# Patient Record
Sex: FEMALE | Race: WHITE | NOT HISPANIC OR LATINO | Employment: OTHER | URBAN - METROPOLITAN AREA
[De-identification: names, ages, dates, MRNs, and addresses within clinical notes are randomized per-mention and may not be internally consistent; named-entity substitution may affect disease eponyms.]

---

## 2017-03-28 ENCOUNTER — TELEPHONE (OUTPATIENT)
Dept: FAMILY MEDICINE | Facility: CLINIC | Age: 57
End: 2017-03-28

## 2017-03-28 NOTE — TELEPHONE ENCOUNTER
Panel Management Review      Patient has the following on her problem list:     Hypertension   Last three blood pressure readings:  BP Readings from Last 3 Encounters:   07/13/16 112/72   04/27/16 102/56   03/20/16 114/61     Blood pressure: Passed    HTN Guidelines:  Age 18-59 BP range:  Less than 140/90  Age 60-85 with Diabetes:  Less than 140/90  Age 60-85 without Diabetes:  less than 150/90      Composite cancer screening  Chart review shows that this patient is due/due soon for the following Colonoscopy  Summary:    Patient is due/failing the following:   COLONOSCOPY    Action needed:   Patient needs referral/order: Colonoscopy     Type of outreach:    Sent letter.  Patient did not read last my chart message, will send letter.    Questions for provider review:    None                                                                                                                                    Jennifer Olea CMA

## 2017-03-28 NOTE — LETTER
[facility]  [address]  [address]  Phone: [phone]      March 28, 2017      Amelia Michel  [address] [place] [address]          Dear Amelia,    In order to ensure we are providing the best quality care, Dr. Sabillon and I have reviewed your chart and see that you are due for a Colonoscopy.     Please call one of the following numbers to schedule a colonoscopy:  [facility] [phone]  [facility] [phone]  [facility] [phone]  [facility] [phone] (multiple sites, call for locations)    Colon cancer screening is recommended starting at the age of 50 for the general population. We have noticed that you have not yet had your colonoscopy. We recognize that this procedure can be time consuming and sometimes uncomfortable. However, colonoscopy is the gold standard for colon cancer screening and may be performed as infrequently as every 10 years as long as the colon appears healthy.  Colonoscopy enables the physician to detect and remove precancerous polyps and identify early cancers during a single examination. Since colorectal cancer is the second leading cause of cancer related deaths in the [place] we strongly recommend screening for this disease.    If you are reluctant to undergo a colonoscopy, there is a less invasive and less expensive alternative. FIT (fecal immunochemical testing(take home stool sample kit)) is a colon screening option.  This is a test to check for blood in the stool, which can be performed in the comfort of your own home. When you have completed the test, you simply mail it in the pre-addressed envelope.  It does not replace the colonoscopy for colorectal cancer screening, but it can detect hidden bleeding in the lower colon.  It does need to be repeated every year and if a positive result is obtained, you would be referred for a colonoscopy. I have included the test.     We greatly appreciate the opportunity  to serve you. Thank you for trusting us with your health care.    Sincerely,    SYED Fritz M.D.

## 2017-04-04 ENCOUNTER — RADIANT APPOINTMENT (OUTPATIENT)
Dept: GENERAL RADIOLOGY | Facility: CLINIC | Age: 57
End: 2017-04-04
Attending: FAMILY MEDICINE
Payer: COMMERCIAL

## 2017-04-04 ENCOUNTER — OFFICE VISIT (OUTPATIENT)
Dept: FAMILY MEDICINE | Facility: CLINIC | Age: 57
End: 2017-04-04
Payer: COMMERCIAL

## 2017-04-04 VITALS
SYSTOLIC BLOOD PRESSURE: 104 MMHG | DIASTOLIC BLOOD PRESSURE: 78 MMHG | TEMPERATURE: 98.9 F | HEIGHT: 58 IN | WEIGHT: 135.8 LBS | BODY MASS INDEX: 28.51 KG/M2 | HEART RATE: 141 BPM

## 2017-04-04 DIAGNOSIS — M05.89 OTHER RHEUMATOID ARTHRITIS WITH RHEUMATOID FACTOR OF MULTIPLE SITES (H): ICD-10-CM

## 2017-04-04 DIAGNOSIS — M25.571 ACUTE RIGHT ANKLE PAIN: Primary | ICD-10-CM

## 2017-04-04 DIAGNOSIS — M25.571 ACUTE RIGHT ANKLE PAIN: ICD-10-CM

## 2017-04-04 DIAGNOSIS — L03.115 CELLULITIS OF RIGHT LOWER EXTREMITY: ICD-10-CM

## 2017-04-04 DIAGNOSIS — I89.0 LYMPHEDEMA OF BOTH LOWER EXTREMITIES: ICD-10-CM

## 2017-04-04 PROCEDURE — 73610 X-RAY EXAM OF ANKLE: CPT | Mod: RT

## 2017-04-04 PROCEDURE — 73630 X-RAY EXAM OF FOOT: CPT | Mod: RT

## 2017-04-04 PROCEDURE — 99214 OFFICE O/P EST MOD 30 MIN: CPT | Performed by: FAMILY MEDICINE

## 2017-04-04 RX ORDER — GABAPENTIN 100 MG/1
200 CAPSULE ORAL 3 TIMES DAILY
Qty: 180 CAPSULE | Refills: 3 | Status: ON HOLD | OUTPATIENT
Start: 2017-04-04 | End: 2017-08-04

## 2017-04-04 RX ORDER — CLINDAMYCIN HCL 300 MG
300 CAPSULE ORAL 3 TIMES DAILY
Qty: 21 CAPSULE | Refills: 0 | Status: SHIPPED | OUTPATIENT
Start: 2017-04-04 | End: 2017-04-11

## 2017-04-04 NOTE — PROGRESS NOTES
SUBJECTIVE:                                                    Amelia Michel is a 56 year old female who presents to clinic today for the following health issues:  Chief Complaint   Patient presents with     Foot Pain     Sunday she noticed that her right foot is swollen. Painful to walk and put pressure on it.        Right foot started to hurt 3 days ago   Was outside barefoot but doesn't remember injury to the foot     It hurts to bear weight   Swelling in ankle     Has RA   Has lymphedema in both feet - multiple surgeries on both feet - new diagnosis of one year   Has neuropathy in both feet   Has had negative doppler of the right leg last year     No change in meds recently     On methotrexate, ariva, and prednisone     Review of systems:  No f/c   No malaise or fatigue  Normal appetite and energy level  No n/v   No d/c       Problem list and histories reviewed & adjusted, as indicated.  Additional history: as documented    Patient Active Problem List   Diagnosis     HTN (hypertension)     Primary pulmonary hypertension (H)     Rheumatoid arthritis (H)     Hyperlipidemia LDL goal <130     Other rheumatoid arthritis with rheumatoid factor of multiple sites (H)     Current Outpatient Prescriptions   Medication     gabapentin (NEURONTIN) 100 MG capsule     diphenoxylate-atropine (LOMOTIL) 2.5-0.025 MG per tablet     ASPIRIN PO     multivitamin, therapeutic with minerals (THERA-VIT-M) TABS     lisinopril (PRINIVIL,ZESTRIL) 30 MG tablet     hydroxychloroquine (PLAQUENIL) 200 MG tablet     leflunomide (ARAVA) 20 MG tablet     folic acid (FOLVITE) 1 MG tablet     predniSONE (DELTASONE) 1 MG tablet     METHOTREXate 2.5 MG tablet     Calcium Carbonate (CALCIUM 500 PO)     [DISCONTINUED] gabapentin (NEURONTIN) 100 MG capsule     pramipexole (MIRAPEX) 0.25 MG tablet     order for DME     traMADol (ULTRAM) 50 MG tablet     No current facility-administered medications for this visit.         Allergies   Allergen  "Reactions     Penicillins      Tape [Adhesive Tape] Rash     /78  Pulse 141  Temp 98.9  F (37.2  C) (Tympanic)  Ht 4' 10.25\" (1.48 m)  Wt 135 lb 12.8 oz (61.6 kg)  BMI 28.14 kg/m2   GENERAL - Pt is alert and oriented in no acute distress.  Affect is appropriate. Good eye contact.  RESPIRATORY - Clear to auscultation bilaterally.  No wheezing noted  CV - RRR, no murmurs, rubs, gallops.     Right ankle - erythematous and swollen. Limited range of motion in all directions but she says that isn't new and isn't painful. Normal achilles. Obvious scarring on both feet   No calf swelling or tenderness. No swelling in the lower legs    Xray right foot and ankle   I independently visualized the xray and my findings were negative for fracture. Soft tissue swelling is visible..   Radiology review pending.      Assessment/Plan -    (M25.571) Acute right ankle pain  (primary encounter diagnosis)  Comment: rest and brace. Crutches for a few days. Elevate as much as possible. Ice often.   Plan: XR Foot Right G/E 3 Views, XR Ankle Right G/E 3        Views            (L03.115) Cellulitis of right lower extremity  Comment: Given redness, swelling, and pain with weight bearing, as well as immunosuppressed status, will treat for cellulitis with clindamycin.  If redness or pain increases, needs to be seen again. The patient indicates understanding of these issues and agrees with the plan.   Plan: clindamycin (CLEOCIN) 300 MG capsule            (M05.89) Other rheumatoid arthritis with rheumatoid factor of multiple sites (H)  Comment:   Plan:     (I89.0) Lymphedema of both lower extremities  Comment:   Plan:    MORGAN Ty MD     "

## 2017-04-04 NOTE — NURSING NOTE
"Chief Complaint   Patient presents with     Foot Pain     Sunday she noticed that her right foot is swollen. Painful to walk and put pressure on it.        Initial /78  Pulse 141  Temp 98.9  F (37.2  C) (Tympanic)  Ht 4' 10.25\" (1.48 m)  Wt 135 lb 12.8 oz (61.6 kg)  BMI 28.14 kg/m2 Estimated body mass index is 28.14 kg/(m^2) as calculated from the following:    Height as of this encounter: 4' 10.25\" (1.48 m).    Weight as of this encounter: 135 lb 12.8 oz (61.6 kg).  Medication Reconciliation: complete  "

## 2017-04-04 NOTE — MR AVS SNAPSHOT
"              After Visit Summary   4/4/2017    Amelia Michel    MRN: [id number]           Patient Information     Date Of Birth          1960        Visit Information        Provider Department      4/4/2017 4:00 PM Chacha Ty MD [facility]        Today's Diagnoses     Acute right ankle pain    -  1    Cellulitis of right lower extremity        Other rheumatoid arthritis with rheumatoid factor of multiple sites (H)        Lymphedema of both lower extremities           Follow-ups after your visit        Who to contact     Normal or non-critical lab and imaging results will be communicated to you by [employer], letter or phone within 4 business days after the clinic has received the results. If you do not hear from us within 7 days, please contact the clinic through [employer] or phone. If you have a critical or abnormal lab result, we will notify you by phone as soon as possible.  Submit refill requests through [employer] or call your pharmacy and they will forward the refill request to us. Please allow 3 business days for your refill to be completed.          If you need to speak with a  for additional information , please call: [phone]             Additional Information About Your Visit        MyChart Information     [employer] gives you secure access to your electronic health record. If you see a primary care provider, you can also send messages to your care team and make appointments. If you have questions, please call your primary care clinic.  If you do not have a primary care provider, please call [phone] and they will assist you.        Care EveryWhere ID     This is your Care EveryWhere ID. This could be used by other organizations to access your [place] medical records  [id number]        Your Vitals Were     Pulse Temperature Height BMI (Body Mass Index)          141 98.9  F (37.2  C) (Tympanic) 4' 10.25\" (1.48 m) 28.14 kg/m2         Blood Pressure from Last " 3 Encounters:   04/04/17 104/78   07/13/16 112/72   04/27/16 102/56    Weight from Last 3 Encounters:   04/04/17 135 lb 12.8 oz (61.6 kg)   07/13/16 133 lb 6.4 oz (60.5 kg)   04/27/16 152 lb (68.9 kg)                 Today's Medication Changes          These changes are accurate as of: 4/4/17  5:02 PM.  If you have any questions, ask your nurse or doctor.               Start taking these medicines.        Dose/Directions    clindamycin 300 MG capsule   Commonly known as:  CLEOCIN   Used for:  Cellulitis of right lower extremity   Started by:  Chacha Ty MD        Dose:  300 mg   Take 1 capsule (300 mg) by mouth 3 times daily for 7 days   Quantity:  21 capsule   Refills:  0         These medicines have changed or have updated prescriptions.        Dose/Directions    gabapentin 100 MG capsule   Commonly known as:  NEURONTIN   This may have changed:  how much to take   Changed by:  Chacha Ty MD        Dose:  200 mg   Take 2 capsules (200 mg) by mouth 3 times daily   Quantity:  180 capsule   Refills:  3            Where to get your medicines      These medications were sent to [facility] [address]  [address] [facility] [address]     Phone:  [phone]     clindamycin 300 MG capsule         These medications were sent to [place], [place] - [address]  [address] [place] [address] [address]     Phone:  [phone]     gabapentin 100 MG capsule                Primary Care Provider Office Phone # Fax #    Comfort Sabillon [phone]       [place] [address] [address] [address]        Thank you!     Thank you for choosing [facility]  for your care. Our goal is always to provide you with excellent care. Hearing back from our patients is one way we can continue to improve our services. Please take a few minutes to complete the written survey that you may receive in  the mail after your visit with us. Thank you!             Your Updated Medication List - Protect others around you: Learn how to safely use, store and throw away your medicines at www.disposemymeds.org.          This list is accurate as of: 4/4/17  5:02 PM.  Always use your most recent med list.                   Brand Name Dispense Instructions for use    ARAVA 20 MG tablet   Generic drug:  leflunomide      Take 20 mg by mouth daily.       ASPIRIN PO      Take 325 mg by mouth       CALCIUM 500 PO      Take  by mouth daily.       clindamycin 300 MG capsule    CLEOCIN    21 capsule    Take 1 capsule (300 mg) by mouth 3 times daily for 7 days       diphenoxylate-atropine 2.5-0.025 MG per tablet    LOMOTIL     Take 1 tablet by mouth 4 times daily as needed for diarrhea       folic acid 1 MG tablet    FOLVITE     Take 1 mg by mouth daily.       gabapentin 100 MG capsule    NEURONTIN    180 capsule    Take 2 capsules (200 mg) by mouth 3 times daily       lisinopril 30 MG tablet    PRINIVIL,ZESTRIL     Take 30 mg by mouth daily Only taking half       methotrexate 2.5 MG tablet CHEMO      Take 10 mg by mouth once a week       multivitamin, therapeutic with minerals Tabs tablet      Take 1 tablet by mouth daily       order for DME     1 each    Equipment being ordered: CLass 2 compression thigh highs: off the shelf or custom, farrow foot wrap and juxta fit premium lower leg wrap, lymphedema bandaging supplies 3 sets       PLAQUENIL 200 MG tablet   Generic drug:  hydroxychloroquine      Take 200 mg by mouth 2 times daily.       pramipexole 0.25 MG tablet    MIRAPEX     Reported on 4/4/2017       predniSONE 1 MG tablet    DELTASONE     Take 3 mg by mouth daily.       traMADol 50 MG tablet    ULTRAM     Reported on 4/4/2017

## 2017-04-18 ENCOUNTER — TRANSFERRED RECORDS (OUTPATIENT)
Dept: HEALTH INFORMATION MANAGEMENT | Facility: CLINIC | Age: 57
End: 2017-04-18

## 2017-05-15 ENCOUNTER — HOSPITAL ENCOUNTER (INPATIENT)
Facility: CLINIC | Age: 57
LOS: 7 days | Discharge: HOME | DRG: 309 | End: 2017-05-23
Attending: EMERGENCY MEDICINE | Admitting: INTERNAL MEDICINE
Payer: COMMERCIAL

## 2017-05-15 DIAGNOSIS — I47.10 SVT (SUPRAVENTRICULAR TACHYCARDIA) (H): ICD-10-CM

## 2017-05-15 DIAGNOSIS — R00.0 TACHYCARDIA: ICD-10-CM

## 2017-05-15 DIAGNOSIS — I47.19 ATRIAL TACHYCARDIA (H): ICD-10-CM

## 2017-05-15 DIAGNOSIS — I48.91 ATRIAL FIBRILLATION STATUS POST CARDIOVERSION (H): Primary | ICD-10-CM

## 2017-05-15 DIAGNOSIS — R53.81 PHYSICAL DECONDITIONING: ICD-10-CM

## 2017-05-15 DIAGNOSIS — N30.00 ACUTE CYSTITIS WITHOUT HEMATURIA: ICD-10-CM

## 2017-05-15 LAB
ANION GAP SERPL CALCULATED.3IONS-SCNC: 7 MMOL/L (ref 3–14)
BASOPHILS # BLD AUTO: 0 10E9/L (ref 0–0.2)
BASOPHILS NFR BLD AUTO: 0.7 %
BUN SERPL-MCNC: 19 MG/DL (ref 7–30)
CALCIUM SERPL-MCNC: 9.2 MG/DL (ref 8.5–10.1)
CHLORIDE SERPL-SCNC: 104 MMOL/L (ref 94–109)
CO2 SERPL-SCNC: 24 MMOL/L (ref 20–32)
CREAT SERPL-MCNC: 0.87 MG/DL (ref 0.52–1.04)
DIFFERENTIAL METHOD BLD: ABNORMAL
EOSINOPHIL # BLD AUTO: 0 10E9/L (ref 0–0.7)
EOSINOPHIL NFR BLD AUTO: 0.7 %
ERYTHROCYTE [DISTWIDTH] IN BLOOD BY AUTOMATED COUNT: 17.6 % (ref 10–15)
GFR SERPL CREATININE-BSD FRML MDRD: 67 ML/MIN/1.7M2
GLUCOSE SERPL-MCNC: 103 MG/DL (ref 70–99)
HCT VFR BLD AUTO: 32.7 % (ref 35–47)
HGB BLD-MCNC: 10.9 G/DL (ref 11.7–15.7)
IMM GRANULOCYTES # BLD: 0 10E9/L (ref 0–0.4)
IMM GRANULOCYTES NFR BLD: 0.2 %
INR BLD: 1 (ref 0.86–1.14)
LYMPHOCYTES # BLD AUTO: 1.2 10E9/L (ref 0.8–5.3)
LYMPHOCYTES NFR BLD AUTO: 27.7 %
MAGNESIUM SERPL-MCNC: 2.2 MG/DL (ref 1.6–2.3)
MCH RBC QN AUTO: 35.9 PG (ref 26.5–33)
MCHC RBC AUTO-ENTMCNC: 33.3 G/DL (ref 31.5–36.5)
MCV RBC AUTO: 108 FL (ref 78–100)
MONOCYTES # BLD AUTO: 0.6 10E9/L (ref 0–1.3)
MONOCYTES NFR BLD AUTO: 13.9 %
NEUTROPHILS # BLD AUTO: 2.4 10E9/L (ref 1.6–8.3)
NEUTROPHILS NFR BLD AUTO: 56.8 %
NRBC # BLD AUTO: 0 10*3/UL
NRBC BLD AUTO-RTO: 0 /100
PLATELET # BLD AUTO: 100 10E9/L (ref 150–450)
POTASSIUM SERPL-SCNC: 4.5 MMOL/L (ref 3.4–5.3)
RBC # BLD AUTO: 3.04 10E12/L (ref 3.8–5.2)
SODIUM SERPL-SCNC: 136 MMOL/L (ref 133–144)
TSH SERPL DL<=0.005 MIU/L-ACNC: 1.03 MU/L (ref 0.4–4)
WBC # BLD AUTO: 4.2 10E9/L (ref 4–11)

## 2017-05-15 PROCEDURE — 25000125 ZZHC RX 250

## 2017-05-15 PROCEDURE — 25000125 ZZHC RX 250: Performed by: STUDENT IN AN ORGANIZED HEALTH CARE EDUCATION/TRAINING PROGRAM

## 2017-05-15 PROCEDURE — 99285 EMERGENCY DEPT VISIT HI MDM: CPT | Mod: 25 | Performed by: EMERGENCY MEDICINE

## 2017-05-15 PROCEDURE — 99292 CRITICAL CARE ADDL 30 MIN: CPT | Mod: 25 | Performed by: EMERGENCY MEDICINE

## 2017-05-15 PROCEDURE — 25000132 ZZH RX MED GY IP 250 OP 250 PS 637: Performed by: EMERGENCY MEDICINE

## 2017-05-15 PROCEDURE — 83735 ASSAY OF MAGNESIUM: CPT | Performed by: EMERGENCY MEDICINE

## 2017-05-15 PROCEDURE — 25000128 H RX IP 250 OP 636: Performed by: EMERGENCY MEDICINE

## 2017-05-15 PROCEDURE — 85610 PROTHROMBIN TIME: CPT | Mod: QW

## 2017-05-15 PROCEDURE — 85025 COMPLETE CBC W/AUTO DIFF WBC: CPT | Performed by: EMERGENCY MEDICINE

## 2017-05-15 PROCEDURE — 80048 BASIC METABOLIC PNL TOTAL CA: CPT | Performed by: EMERGENCY MEDICINE

## 2017-05-15 PROCEDURE — 96365 THER/PROPH/DIAG IV INF INIT: CPT | Performed by: EMERGENCY MEDICINE

## 2017-05-15 PROCEDURE — 84443 ASSAY THYROID STIM HORMONE: CPT | Performed by: EMERGENCY MEDICINE

## 2017-05-15 PROCEDURE — 96375 TX/PRO/DX INJ NEW DRUG ADDON: CPT | Performed by: EMERGENCY MEDICINE

## 2017-05-15 PROCEDURE — 93010 ELECTROCARDIOGRAM REPORT: CPT | Mod: Z6 | Performed by: EMERGENCY MEDICINE

## 2017-05-15 PROCEDURE — 93005 ELECTROCARDIOGRAM TRACING: CPT | Mod: 76

## 2017-05-15 PROCEDURE — 96366 THER/PROPH/DIAG IV INF ADDON: CPT | Performed by: EMERGENCY MEDICINE

## 2017-05-15 PROCEDURE — 25000132 ZZH RX MED GY IP 250 OP 250 PS 637: Performed by: STUDENT IN AN ORGANIZED HEALTH CARE EDUCATION/TRAINING PROGRAM

## 2017-05-15 PROCEDURE — 96376 TX/PRO/DX INJ SAME DRUG ADON: CPT | Performed by: EMERGENCY MEDICINE

## 2017-05-15 PROCEDURE — 93005 ELECTROCARDIOGRAM TRACING: CPT | Performed by: EMERGENCY MEDICINE

## 2017-05-15 PROCEDURE — 25000125 ZZHC RX 250: Performed by: EMERGENCY MEDICINE

## 2017-05-15 PROCEDURE — 99291 CRITICAL CARE FIRST HOUR: CPT | Mod: 25 | Performed by: EMERGENCY MEDICINE

## 2017-05-15 RX ORDER — DILTIAZEM HYDROCHLORIDE 5 MG/ML
INJECTION INTRAVENOUS
Status: COMPLETED
Start: 2017-05-15 | End: 2017-05-15

## 2017-05-15 RX ORDER — DILTIAZEM HYDROCHLORIDE 5 MG/ML
20 INJECTION INTRAVENOUS ONCE
Status: COMPLETED | OUTPATIENT
Start: 2017-05-15 | End: 2017-05-15

## 2017-05-15 RX ORDER — METOPROLOL TARTRATE 25 MG/1
25 TABLET, FILM COATED ORAL ONCE
Status: COMPLETED | OUTPATIENT
Start: 2017-05-15 | End: 2017-05-15

## 2017-05-15 RX ORDER — GABAPENTIN 100 MG/1
200 CAPSULE ORAL ONCE
Status: COMPLETED | OUTPATIENT
Start: 2017-05-15 | End: 2017-05-15

## 2017-05-15 RX ORDER — ADENOSINE 3 MG/ML
6 INJECTION, SOLUTION INTRAVENOUS ONCE
Status: COMPLETED | OUTPATIENT
Start: 2017-05-15 | End: 2017-05-15

## 2017-05-15 RX ADMIN — ADENOSINE 6 MG: 3 INJECTION, SOLUTION INTRAVENOUS at 18:05

## 2017-05-15 RX ADMIN — DILTIAZEM HYDROCHLORIDE 5 MG/HR: 5 INJECTION INTRAVENOUS at 23:44

## 2017-05-15 RX ADMIN — DILTIAZEM HYDROCHLORIDE 20 MG: 5 INJECTION INTRAVENOUS at 18:08

## 2017-05-15 RX ADMIN — GABAPENTIN 200 MG: 100 CAPSULE ORAL at 23:44

## 2017-05-15 RX ADMIN — DILTIAZEM HYDROCHLORIDE 20 MG: 5 INJECTION INTRAVENOUS at 22:36

## 2017-05-15 RX ADMIN — METOPROLOL TARTRATE 25 MG: 25 TABLET ORAL at 23:44

## 2017-05-15 NOTE — ED PROVIDER NOTES
"  History     Chief Complaint   Patient presents with     Tachycardia     HPI  Amelia Michel is a 56 year old female who presents to the emergency room with tachycardia.  Patient states that she has been having elevated heart rates over the past weekend.  She has been attempting to resolve her tachycardia with vagal maneuvers.  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  She denies any previous similar symptoms in the past.  She denies any illness, chest pain, shortness of breath, difficulty breathing, fatigue or dizziness.  She denies any cardiac history.    I have reviewed the Medications, Allergies, Past Medical and Surgical History, and Social History in the Epic system.    Review of Systems   All other systems reviewed and are negative.      Physical Exam   BP: 121/77  Heart Rate: 103  Temp: 98.9  F (37.2  C)  Resp: 15  Height: 147.3 cm (4' 10\")  Weight: 63.3 kg (139 lb 9.6 oz)  SpO2: 98 %  Physical Exam   Constitutional: She is oriented to person, place, and time. She appears well-developed and well-nourished. No distress.   HENT:   Head: Normocephalic and atraumatic.   Eyes: No scleral icterus.   Neck: Normal range of motion. Neck supple.   Cardiovascular: Tachycardia present.    Pulmonary/Chest: Effort normal. No respiratory distress.   Neurological: She is alert and oriented to person, place, and time.   Skin: Skin is warm and dry. No rash noted. She is not diaphoretic. No erythema. No pallor.       ED Course     ED Course     Procedures             EKG Interpretation:      Interpreted by Darnell Hernandez  Time reviewed: 1741  Symptoms at time of EKG: Tachycardia   Rhythm: Supraventricular tachycardia   Rate: 214  Axis: normal  Ectopy: none  Conduction: normal  ST Segments/ T Waves: No ST-T wave changes  Q Waves: none  Comparison to prior: New supraventricular tachycardia    Clinical Impression: Supraventricular tachycardia      Critical Care time:  was 90 minutes for this patient excluding " procedures.               Labs Ordered and Resulted from Time of ED Arrival Up to the Time of Departure from the ED   BASIC METABOLIC PANEL - Abnormal; Notable for the following:        Result Value    Glucose 103 (*)     All other components within normal limits   CBC WITH PLATELETS DIFFERENTIAL - Abnormal; Notable for the following:     RBC Count 3.04 (*)     Hemoglobin 10.9 (*)     Hematocrit 32.7 (*)      (*)     MCH 35.9 (*)     RDW 17.6 (*)     Platelet Count 100 (*)     All other components within normal limits   INR POINT OF CARE   MAGNESIUM   TSH WITH FREE T4 REFLEX   ISTAT INR NURSING POCT           Results for orders placed or performed during the hospital encounter of 05/15/17   Basic metabolic panel   Result Value Ref Range    Sodium 136 133 - 144 mmol/L    Potassium 4.5 3.4 - 5.3 mmol/L    Chloride 104 94 - 109 mmol/L    Carbon Dioxide 24 20 - 32 mmol/L    Anion Gap 7 3 - 14 mmol/L    Glucose 103 (H) 70 - 99 mg/dL    Urea Nitrogen 19 7 - 30 mg/dL    Creatinine 0.87 0.52 - 1.04 mg/dL    GFR Estimate 67 >60 mL/min/1.7m2    GFR Estimate If Black 81 >60 mL/min/1.7m2    Calcium 9.2 8.5 - 10.1 mg/dL   CBC with platelets differential   Result Value Ref Range    WBC 4.2 4.0 - 11.0 10e9/L    RBC Count 3.04 (L) 3.8 - 5.2 10e12/L    Hemoglobin 10.9 (L) 11.7 - 15.7 g/dL    Hematocrit 32.7 (L) 35.0 - 47.0 %     (H) 78 - 100 fl    MCH 35.9 (H) 26.5 - 33.0 pg    MCHC 33.3 31.5 - 36.5 g/dL    RDW 17.6 (H) 10.0 - 15.0 %    Platelet Count 100 (L) 150 - 450 10e9/L    Diff Method Automated Method     % Neutrophils 56.8 %    % Lymphocytes 27.7 %    % Monocytes 13.9 %    % Eosinophils 0.7 %    % Basophils 0.7 %    % Immature Granulocytes 0.2 %    Nucleated RBCs 0 0 /100    Absolute Neutrophil 2.4 1.6 - 8.3 10e9/L    Absolute Lymphocytes 1.2 0.8 - 5.3 10e9/L    Absolute Monocytes 0.6 0.0 - 1.3 10e9/L    Absolute Eosinophils 0.0 0.0 - 0.7 10e9/L    Absolute Basophils 0.0 0.0 - 0.2 10e9/L    Abs Immature  Granulocytes 0.0 0 - 0.4 10e9/L    Absolute Nucleated RBC 0.0    INR point of care   Result Value Ref Range    INR Point of Care 1.0 0.86 - 1.14   Magnesium   Result Value Ref Range    Magnesium 2.2 1.6 - 2.3 mg/dL   TSH with free T4 reflex   Result Value Ref Range    TSH 1.03 0.40 - 4.00 mU/L   EKG 12-lead, tracing only   Result Value Ref Range    Interpretation ECG Click View Image link to view waveform and result    EKG 12 lead   Result Value Ref Range    Interpretation ECG Click View Image link to view waveform and result      Medications   metoprolol (LOPRESSOR) tablet 25 mg (not administered)   gabapentin (NEURONTIN) capsule 200 mg (not administered)   diltiazem (CARDIZEM) 125 mg in NaCl 0.9 % 125 mL infusion (not administered)   adenosine (ADENOCARD) injection 6 mg (6 mg Intravenous Given 5/15/17 1805)   diltiazem (CARDIZEM) injection 20 mg (20 mg Intravenous Given 5/15/17 1808)   diltiazem (CARDIZEM) injection 20 mg (20 mg Intravenous Given 5/15/17 2236)         Assessments & Plan (with Medical Decision Making)   This is a 56-year-old female patient coming into emergency room with tachycardia.  Her EKG shows supraventricular tachycardia at a rate of 214.  She is provided with a single dose of 600 g of adenosine which showed that she was in atrial flutter.  She was provided with a cervical dose of Cardizem IV here in the emergency room with significant improvement of her symptoms.  She was observed for a period of time and was found to convert back into supraventricular tachycardia.  She was given another IV bolus as well as started on a Cardizem drip.  The case was discussed with cardiology who admitted to their service for continued care and management.    I have reviewed the nursing notes.    I have reviewed the findings, diagnosis, plan and need for follow up with the patient.    New Prescriptions    No medications on file       Final diagnoses:   Tachycardia       5/15/2017   [facility], [place], EMERGENCY  [facility]arnell ryan MD  05/15/17 2380

## 2017-05-15 NOTE — IP AVS SNAPSHOT
MRN:[id number]                      After Visit Summary   5/15/2017    Amelia Michel    MRN: [id number]           Thank you!     Thank you for choosing [facility] for your care. Our goal is always to provide you with excellent care. Hearing back from our patients is one way we can continue to improve our services. Please take a few minutes to complete the written survey that you may receive in the mail after you visit with us. Thank you!        Patient Information     Date Of Birth          1960        Designated Caregiver       Most Recent Value    Caregiver    Will someone help with your care after discharge? no      About your hospital stay     You were admitted on:  May 16, 2017 You last received care in the:  [address] [facility]    You were discharged on:  May 23, 2017        Reason for your hospital stay       Atrial tachycardia (afib/aflutter)                  Who to Call     For medical emergencies, please call 911.  For non-urgent questions about your medical care, please call your primary care provider or clinic, [phone]  For questions related to your surgery, please call your surgery clinic        Attending Provider     Provider Specialty    Darnell Hernandez MD Emergency Medicine    Yaya Slaughter MD Cardiology    China VillageDilan MD Cardiology       Primary Care Provider Office Phone # Fax #    Comfort Sabillon [phone]       [facility] [address] [facility] [address]        After Care Instructions     Activity       Your activity upon discharge: activity as tolerated            Diet       Follow this diet upon discharge: Cardiac                  Follow-up Appointments     Adult [facility] Follow-up and recommended labs and tests       Follow up with primary care provider, Comfort Sabillon, within 7 days to evaluate medication change, for hospital follow- up and regarding new diagnosis.  Follow up CXR in 1 month regarding  right pleural effusion resolution.    Follow up with EP in clinic with Dr. Eaton in 2 weeks regarding ablation for new diagnosis atrial fibrillation/flutter with rvr, svt.   *Patient has been scheduled with Dr. Eaton (per the Cardiology Clinic)for his next available appointment on Monday, 7/5 at 8:00a. I have sent a message to see if she can be seen sooner with him*     Appointments on [place] and/or [facility] (with [facility] or [facility] provider or service). Call [phone] if you haven't heard regarding these appointments within 7 days of discharge.                  Your next 10 appointments already scheduled     Jul 05, 2017  8:00 AM CDT   (Arrive by 7:45 AM)   RETURN ATRIAL FIBULATION VISIT with Juan Eaton MD   [facility] ([facility])    [address] [place] [id number] [phone]              Additional Services     Medication Therapy Management Referral       Reason for referral:  on more than 10 medications and hospitalized or in the ED in the past 6 months     This service is designed to help you get the most from your medications.  A specially trained pharmacist will work closely with you and your doctors  to solve any problems related to your medications and to help you get the   best results from taking them.      The Medication Therapy Management staff will call you to schedule an appointment.            Physical Therapy Referral       *This therapy referral will be filtered to a centralized scheduling office at [facility] and the patient will receive a call to schedule an appointment at a [facility] location most convenient for them. *       [facility] provides Physical Therapy evaluation and treatment and many specialty services across the [facility] system.  If requesting a specialty program, please choose from the list below.    If you have not heard from the scheduling office within 2  "business days, please call [phone] for all locations, with the exception of [place], please call [phone].  Treatment: Evaluation & Treatment  Special Instructions/Modalities: evaluate and treat  Special Programs: None    Please be aware that coverage of these services is subject to the terms and limitations of your health insurance plan.  Call member services at your health plan with any benefit or coverage questions.      **Note to Provider:  If you are referring outside of [facility] for the therapy appointment, please list the name of the location in the  special instructions  above, print the referral and give to the patient to schedule the appointment.                  Pending Results     Date and Time Order Name Status Description    5/22/2017 0939 MRI Angiogram chest w & w/o contrast Preliminary     5/22/2017 0851 MRI Cardiac w/contrast Preliminary     5/21/2017 0800 Urine Culture Aerobic Bacterial Preliminary     5/21/2017 0715 Blood culture Preliminary     5/21/2017 0715 Blood culture Preliminary     5/17/2017 0738 Cardioversion In process             Statement of Approval     Ordered          05/19/17 1631  I have reviewed and agree with all the recommendations and orders detailed in this document.  EFFECTIVE NOW     Approved and electronically signed by:  Verna Caba MD             Admission Information     Date & Time Provider Department Dept. Phone    5/15/2017 Dilan Angela MD [address] [facility] [phone]      Your Vitals Were     Blood Pressure Pulse Temperature Respirations Height Weight    125/53 (BP Location: Left arm) 75 98.3  F (36.8  C) (Oral) 16 1.473 m (4' 10\") 64.4 kg (141 lb 15.6 oz)    Pulse Oximetry BMI (Body Mass Index)                97% 29.67 kg/m2          MyChart Information     The [facility] gives you secure access to your electronic health record. If you see a primary care provider, you can also send messages to your care team and make appointments. If " you have questions, please call your primary care clinic.  If you do not have a primary care provider, please call [phone] and they will assist you.        Care EveryWhere ID     This is your Care EveryWhere ID. This could be used by other organizations to access your [facility] medical records  [id number]           Review of your medicines      START taking        Dose / Directions    * amiodarone HCl 400 MG Tabs   Used for:  SVT (supraventricular tachycardia) (H), Atrial fibrillation status post cardioversion (H)        Dose:  400 mg   Take 400 mg by mouth 2 times daily for 12 days   Quantity:  24 tablet   Refills:  0       * amiodarone 200 MG tablet   Commonly known as:  PACERONE/CODARONE   Used for:  SVT (supraventricular tachycardia) (H), Atrial fibrillation status post cardioversion (H)        Dose:  200 mg   Start taking on:  6/4/2017   Take 1 tablet (200 mg) by mouth daily   Quantity:  30 tablet   Refills:  3       apixaban ANTICOAGULANT 5 MG tablet   Commonly known as:  ELIQUIS   Used for:  Atrial fibrillation status post cardioversion (H)        Dose:  5 mg   Take 1 tablet (5 mg) by mouth 2 times daily   Quantity:  60 tablet   Refills:  3       diltiazem 120 MG 24 hr capsule        Dose:  120 mg   Take 1 capsule (120 mg) by mouth daily   Quantity:  30 capsule   Refills:  1       metoprolol 100 MG 24 hr tablet   Commonly known as:  TOPROL-XL   Used for:  Tachycardia, SVT (supraventricular tachycardia) (H), Atrial fibrillation status post cardioversion (H)        Dose:  100 mg   Take 1 tablet (100 mg) by mouth daily   Quantity:  30 tablet   Refills:  3       nitrofurantoin (macrocrystal-monohydrate) 100 MG capsule   Commonly known as:  MACROBID   Indication:  Urinary Tract Infection   Used for:  Acute cystitis without hematuria        Dose:  100 mg   Take 1 capsule (100 mg) by mouth every 12 hours for 8 days   Quantity:  16 capsule   Refills:  0       * Notice:  This list has 2 medication(s) that are  the same as other medications prescribed for you. Read the directions carefully, and ask your doctor or other care provider to review them with you.      CONTINUE these medicines which may have CHANGED, or have new prescriptions. If we are uncertain of the size of tablets/capsules you have at home, strength may be listed as something that might have changed.        Dose / Directions    gabapentin 100 MG capsule   Commonly known as:  NEURONTIN   This may have changed:    - when to take this  - additional instructions        Dose:  200 mg   Take 2 capsules (200 mg) by mouth 3 times daily   Quantity:  180 capsule   Refills:  3         CONTINUE these medicines which have NOT CHANGED        Dose / Directions    ARAVA 20 MG tablet   Generic drug:  leflunomide        Dose:  20 mg   Take 20 mg by mouth daily In the morning   Refills:  0       ASPIRIN PO        Dose:  325 mg   Take 325 mg by mouth daily At bedtime   Refills:  0       Calcium Carb-Cholecalciferol 600-800 MG-UNIT Tabs        Dose:  2 tablet   Take 2 tablets by mouth every morning   Refills:  0       COQ-10 PO        Dose:  1 tablet   Take 1 tablet by mouth daily In the morning   Refills:  0       diphenoxylate-atropine 2.5-0.025 MG per tablet   Commonly known as:  LOMOTIL        Dose:  0.5 tablet   Take 0.5 tablets by mouth 2 times daily   Refills:  0       folic acid 1 MG tablet   Commonly known as:  FOLVITE        Dose:  1 mg   Take 1 mg by mouth daily.   Refills:  0       methotrexate 2.5 MG tablet CHEMO        Dose:  10 mg   Take 10 mg by mouth once a week On Tuesdays   Refills:  0       PLAQUENIL 200 MG tablet   Generic drug:  hydroxychloroquine        Take 1 tablet by mouth twice daily on Monday, Wednesday, and Friday and 1 tablet once daily by mouth on Tuesday, Thursday, Saturday, Sunday.   Refills:  0       pramipexole 0.25 MG tablet   Commonly known as:  MIRAPEX        Reported on 4/4/2017   Refills:  0       predniSONE 1 MG tablet   Commonly known  as:  DELTASONE        Dose:  3 mg   Take 3 mg by mouth daily In the morning   Refills:  0       traMADol 50 MG tablet   Commonly known as:  ULTRAM        Dose:  50 mg   Take 50 mg by mouth Every 6 hours as needed for pain   Refills:  0       WOMENS MULTIVITAMIN PO        Dose:  1 tablet   Take 1 tablet by mouth daily At bedtime   Refills:  0         STOP taking     lisinopril 30 MG tablet   Commonly known as:  PRINIVIL,ZESTRIL                Where to get your medicines      These medications were sent to [facility] [place], [place] - [address] [facility]  [address] [facility] [address]     Phone:  [phone]     apixaban ANTICOAGULANT 5 MG tablet    diltiazem 120 MG 24 hr capsule         Some of these will need a paper prescription and others can be bought over the counter. Ask your nurse if you have questions.     Bring a paper prescription for each of these medications     amiodarone 200 MG tablet    amiodarone HCl 400 MG Tabs    metoprolol 100 MG 24 hr tablet    nitrofurantoin (macrocrystal-monohydrate) 100 MG capsule                Protect others around you: Learn how to safely use, store and throw away your medicines at www.disposemymeds.org.             Medication List: This is a list of all your medications and when to take them. Check marks below indicate your daily home schedule. Keep this list as a reference.      Medications           Morning Afternoon Evening Bedtime As Needed    * amiodarone HCl 400 MG Tabs   Take 400 mg by mouth 2 times daily for 12 days   Last time this was given:  400 mg on 5/23/2017  7:50 AM                                      * amiodarone 200 MG tablet   Commonly known as:  PACERONE/CODARONE   Take 1 tablet (200 mg) by mouth daily   Start taking on:  6/4/2017   Last time this was given:  400 mg on 5/23/2017  7:50 AM                                   apixaban ANTICOAGULANT 5 MG tablet   Commonly known as:  ELIQUIS   Take 1 tablet (5 mg) by mouth 2  times daily   Last time this was given:  5 mg on 5/23/2017  7:50 AM                                      ARAVA 20 MG tablet   Take 20 mg by mouth daily In the morning   Last time this was given:  20 mg on 5/23/2017  7:49 AM   Generic drug:  leflunomide                                   ASPIRIN PO   Take 325 mg by mouth daily At bedtime   Last time this was given:  325 mg on 5/18/2017  7:40 AM                                   Calcium Carb-Cholecalciferol 600-800 MG-UNIT Tabs   Take 2 tablets by mouth every morning                                   COQ-10 PO   Take 1 tablet by mouth daily In the morning                                   diltiazem 120 MG 24 hr capsule   Take 1 capsule (120 mg) by mouth daily   Last time this was given:  120 mg on 5/23/2017  7:47 AM                                   diphenoxylate-atropine 2.5-0.025 MG per tablet   Commonly known as:  LOMOTIL   Take 0.5 tablets by mouth 2 times daily   Last time this was given:  0.5 tablets on 5/17/2017 10:05 PM                                      folic acid 1 MG tablet   Commonly known as:  FOLVITE   Take 1 mg by mouth daily.                                   gabapentin 100 MG capsule   Commonly known as:  NEURONTIN   Take 2 capsules (200 mg) by mouth 3 times daily   Last time this was given:  200 mg on 5/23/2017  2:11 PM                                         methotrexate 2.5 MG tablet CHEMO   Take 10 mg by mouth once a week On Tuesdays   Last time this was given:  10 mg on 5/23/2017  7:49 AM                                   metoprolol 100 MG 24 hr tablet   Commonly known as:  TOPROL-XL   Take 1 tablet (100 mg) by mouth daily   Last time this was given:  100 mg on 5/23/2017  7:49 AM                                   nitrofurantoin (macrocrystal-monohydrate) 100 MG capsule   Commonly known as:  MACROBID   Take 1 capsule (100 mg) by mouth every 12 hours for 8 days   Last time this was given:  100 mg on 5/23/2017 12:45 PM                                       PLAQUENIL 200 MG tablet   Take 1 tablet by mouth twice daily on Monday, Wednesday, and Friday and 1 tablet once daily by mouth on Tuesday, Thursday, Saturday, Sunday.   Last time this was given:  200 mg on 5/23/2017  7:49 AM   Generic drug:  hydroxychloroquine                                      pramipexole 0.25 MG tablet   Commonly known as:  MIRAPEX   Reported on 4/4/2017   Last time this was given:  0.25 mg on 5/16/2017  8:18 PM                                   predniSONE 1 MG tablet   Commonly known as:  DELTASONE   Take 3 mg by mouth daily In the morning   Last time this was given:  3 mg on 5/23/2017  7:50 AM                                   traMADol 50 MG tablet   Commonly known as:  ULTRAM   Take 50 mg by mouth Every 6 hours as needed for pain   Last time this was given:  50 mg on 5/22/2017  1:45 PM                                   WOMENS MULTIVITAMIN PO   Take 1 tablet by mouth daily At bedtime                                   * Notice:  This list has 2 medication(s) that are the same as other medications prescribed for you. Read the directions carefully, and ask your doctor or other care provider to review them with you.

## 2017-05-15 NOTE — ED NOTES
Pt arrived to the ED with c/o tachycardia, fatigue, MONTALVO and headache. Per pt HR was as high as 150BPM over the weekend. During triage vitals stable, afebrile. Pt alert and oriented x4.

## 2017-05-15 NOTE — IP AVS SNAPSHOT
[address] [address]    Phone:  [phone]                                       After Visit Summary   5/15/2017    Amelia Michel    MRN: [id number]           After Visit Summary Signature Page     I have received my discharge instructions, and my questions have been answered. I have discussed any challenges I see with this plan with the nurse or doctor.    ..........................................................................................................................................  Patient/Patient Representative Signature      ..........................................................................................................................................  Patient Representative Print Name and Relationship to Patient    ..................................................               ................................................  Date                                            Time    ..........................................................................................................................................  Reviewed by Signature/Title    ...................................................              ..............................................  Date                                                            Time

## 2017-05-15 NOTE — LETTER
Transition Communication Hand-off for Care Transitions to Next Level of Care Provider    Name: Amelia Michel  MRN #: [id number]  Primary Care Provider: Comfort Sabillon     Primary Clinic: [facility] [address] [facility] [address]     Reason for Hospitalization:  Tachycardia [R00.0]  Hypotension, iatrogenic [I95.89]  Admit Date/Time: 5/15/2017  5:34 PM  Discharge Date: 5/23/17  Payor Source: Payor: PREFERREDONE / Plan: [employer] ADMIN SERV OPEN ACCESS / Product Type: PPO /     Readmission Assessment Measure (DUC) Risk Score/category: low   Reason for Communication Hand-off Referral: Multiple providers/specialties    Discharge Plan:   Concern for non-adherence with plan of care:   no  Discharge Needs Assessment:  Needs       Most Recent Value    Equipment Currently Used at Home none [owns 4WW, w/c]    Transportation Available car, family or friend will provide        Follow-up specialty is recommended: Yes    Follow-up plan:  Future Appointments  Date Time Provider Department Center   7/5/2017 8:00 AM Juan Eaton MD [facility]       Any outstanding tests or procedures:        Referrals     Future Labs/Procedures    Medication Therapy Management Referral     Comments:    Reason for referral:  on more than 10 medications and hospitalized or in the ED in the past 6 months     This service is designed to help you get the most from your medications.  A specially trained pharmacist will work closely with you and your doctors  to solve any problems related to your medications and to help you get the   best results from taking them.      The Medication Therapy Management staff will call you to schedule an appointment.    Physical Therapy Referral     Comments:    *This therapy referral will be filtered to a centralized scheduling office at [facility] and the patient will receive a call to schedule an appointment at a [facility] location most convenient for them. *       [facility] provides Physical Therapy evaluation and treatment and many specialty services across the [facility] system.  If requesting a specialty program, please choose from the list below.    If you have not heard from the scheduling office within 2 business days, please call [phone] for all locations, with the exception of [place], please call [phone].  Treatment: Evaluation & Treatment  Special Instructions/Modalities: evaluate and treat  Special Programs: None    Please be aware that coverage of these services is subject to the terms and limitations of your health insurance plan.  Call member services at your health plan with any benefit or coverage questions.      **Note to Provider:  If you are referring outside of [facility] for the therapy appointment, please list the name of the location in the  special instructions  above, print the referral and give to the patient to schedule the appointment.            Key Recommendations:  Post hospitalization follow up.     DYLAN HUTCHINS RN CC    AVS/Discharge Summary is the source of truth; this is a helpful guide for improved communication of patient story

## 2017-05-16 ENCOUNTER — APPOINTMENT (OUTPATIENT)
Dept: CARDIOLOGY | Facility: CLINIC | Age: 57
DRG: 309 | End: 2017-05-16
Attending: STUDENT IN AN ORGANIZED HEALTH CARE EDUCATION/TRAINING PROGRAM
Payer: COMMERCIAL

## 2017-05-16 PROBLEM — I47.19 ATRIAL TACHYCARDIA (H): Status: ACTIVE | Noted: 2017-05-16

## 2017-05-16 LAB
ANION GAP SERPL CALCULATED.3IONS-SCNC: 5 MMOL/L (ref 3–14)
BUN SERPL-MCNC: 14 MG/DL (ref 7–30)
CALCIUM SERPL-MCNC: 8.2 MG/DL (ref 8.5–10.1)
CHLORIDE SERPL-SCNC: 108 MMOL/L (ref 94–109)
CO2 SERPL-SCNC: 25 MMOL/L (ref 20–32)
CREAT SERPL-MCNC: 0.82 MG/DL (ref 0.52–1.04)
ERYTHROCYTE [DISTWIDTH] IN BLOOD BY AUTOMATED COUNT: 18 % (ref 10–15)
GFR SERPL CREATININE-BSD FRML MDRD: 72 ML/MIN/1.7M2
GLUCOSE SERPL-MCNC: 82 MG/DL (ref 70–99)
HCT VFR BLD AUTO: 29 % (ref 35–47)
HGB BLD-MCNC: 9.6 G/DL (ref 11.7–15.7)
INTERPRETATION ECG - MUSE: NORMAL
INTERPRETATION ECG - MUSE: NORMAL
LMWH PPP CHRO-ACNC: 0.12 IU/ML
LMWH PPP CHRO-ACNC: 0.15 IU/ML
MAGNESIUM SERPL-MCNC: 1.9 MG/DL (ref 1.6–2.3)
MCH RBC QN AUTO: 35.6 PG (ref 26.5–33)
MCHC RBC AUTO-ENTMCNC: 33.1 G/DL (ref 31.5–36.5)
MCV RBC AUTO: 107 FL (ref 78–100)
PLATELET # BLD AUTO: 88 10E9/L (ref 150–450)
POTASSIUM SERPL-SCNC: 4 MMOL/L (ref 3.4–5.3)
RBC # BLD AUTO: 2.7 10E12/L (ref 3.8–5.2)
SODIUM SERPL-SCNC: 139 MMOL/L (ref 133–144)
WBC # BLD AUTO: 4.7 10E9/L (ref 4–11)

## 2017-05-16 PROCEDURE — 36415 COLL VENOUS BLD VENIPUNCTURE: CPT | Performed by: INTERNAL MEDICINE

## 2017-05-16 PROCEDURE — 25000131 ZZH RX MED GY IP 250 OP 636 PS 637: Performed by: STUDENT IN AN ORGANIZED HEALTH CARE EDUCATION/TRAINING PROGRAM

## 2017-05-16 PROCEDURE — 21400006 ZZH R&B CCU INTERMEDIATE UMMC

## 2017-05-16 PROCEDURE — 80048 BASIC METABOLIC PNL TOTAL CA: CPT | Performed by: STUDENT IN AN ORGANIZED HEALTH CARE EDUCATION/TRAINING PROGRAM

## 2017-05-16 PROCEDURE — 40000264 ECHO COMPLETE WITH OPTISON

## 2017-05-16 PROCEDURE — 99223 1ST HOSP IP/OBS HIGH 75: CPT | Mod: 25 | Performed by: INTERNAL MEDICINE

## 2017-05-16 PROCEDURE — 25000125 ZZHC RX 250: Performed by: STUDENT IN AN ORGANIZED HEALTH CARE EDUCATION/TRAINING PROGRAM

## 2017-05-16 PROCEDURE — 25000128 H RX IP 250 OP 636: Performed by: STUDENT IN AN ORGANIZED HEALTH CARE EDUCATION/TRAINING PROGRAM

## 2017-05-16 PROCEDURE — 96375 TX/PRO/DX INJ NEW DRUG ADDON: CPT | Performed by: EMERGENCY MEDICINE

## 2017-05-16 PROCEDURE — 25000132 ZZH RX MED GY IP 250 OP 250 PS 637: Performed by: STUDENT IN AN ORGANIZED HEALTH CARE EDUCATION/TRAINING PROGRAM

## 2017-05-16 PROCEDURE — 93306 TTE W/DOPPLER COMPLETE: CPT | Mod: 26 | Performed by: INTERNAL MEDICINE

## 2017-05-16 PROCEDURE — 92960 CARDIOVERSION ELECTRIC EXT: CPT | Performed by: INTERNAL MEDICINE

## 2017-05-16 PROCEDURE — 25000132 ZZH RX MED GY IP 250 OP 250 PS 637: Performed by: INTERNAL MEDICINE

## 2017-05-16 PROCEDURE — 96366 THER/PROPH/DIAG IV INF ADDON: CPT | Performed by: EMERGENCY MEDICINE

## 2017-05-16 PROCEDURE — 83735 ASSAY OF MAGNESIUM: CPT | Performed by: STUDENT IN AN ORGANIZED HEALTH CARE EDUCATION/TRAINING PROGRAM

## 2017-05-16 PROCEDURE — 96367 TX/PROPH/DG ADDL SEQ IV INF: CPT | Performed by: EMERGENCY MEDICINE

## 2017-05-16 PROCEDURE — 99221 1ST HOSP IP/OBS SF/LOW 40: CPT | Performed by: INTERNAL MEDICINE

## 2017-05-16 PROCEDURE — 25500064 ZZH RX 255 OP 636: Performed by: INTERNAL MEDICINE

## 2017-05-16 PROCEDURE — 85027 COMPLETE CBC AUTOMATED: CPT | Performed by: STUDENT IN AN ORGANIZED HEALTH CARE EDUCATION/TRAINING PROGRAM

## 2017-05-16 PROCEDURE — 85520 HEPARIN ASSAY: CPT | Performed by: INTERNAL MEDICINE

## 2017-05-16 RX ORDER — LIDOCAINE 40 MG/G
CREAM TOPICAL
Status: DISCONTINUED | OUTPATIENT
Start: 2017-05-16 | End: 2017-05-23 | Stop reason: HOSPADM

## 2017-05-16 RX ORDER — HEPARIN SODIUM 10000 [USP'U]/100ML
0-3500 INJECTION, SOLUTION INTRAVENOUS CONTINUOUS
Status: DISCONTINUED | OUTPATIENT
Start: 2017-05-16 | End: 2017-05-18

## 2017-05-16 RX ORDER — PRAMIPEXOLE DIHYDROCHLORIDE 0.25 MG/1
0.25 TABLET ORAL 3 TIMES DAILY
Status: DISCONTINUED | OUTPATIENT
Start: 2017-05-16 | End: 2017-05-17

## 2017-05-16 RX ORDER — ACETAMINOPHEN 325 MG/1
650 TABLET ORAL EVERY 4 HOURS PRN
Status: DISCONTINUED | OUTPATIENT
Start: 2017-05-16 | End: 2017-05-23 | Stop reason: HOSPADM

## 2017-05-16 RX ORDER — HYDROXYCHLOROQUINE SULFATE 200 MG/1
200 TABLET, FILM COATED ORAL
Status: DISCONTINUED | OUTPATIENT
Start: 2017-05-16 | End: 2017-05-23 | Stop reason: HOSPADM

## 2017-05-16 RX ORDER — ASPIRIN 325 MG
325 TABLET ORAL DAILY
Status: DISCONTINUED | OUTPATIENT
Start: 2017-05-16 | End: 2017-05-18

## 2017-05-16 RX ORDER — HYDROXYCHLOROQUINE SULFATE 200 MG/1
200 TABLET, FILM COATED ORAL
Status: DISCONTINUED | OUTPATIENT
Start: 2017-05-17 | End: 2017-05-17

## 2017-05-16 RX ORDER — METOPROLOL TARTRATE 1 MG/ML
5 INJECTION, SOLUTION INTRAVENOUS EVERY 5 MIN PRN
Status: DISCONTINUED | OUTPATIENT
Start: 2017-05-16 | End: 2017-05-18

## 2017-05-16 RX ORDER — LEFLUNOMIDE 20 MG/1
20 TABLET ORAL DAILY
Status: DISCONTINUED | OUTPATIENT
Start: 2017-05-16 | End: 2017-05-23 | Stop reason: HOSPADM

## 2017-05-16 RX ORDER — NITROGLYCERIN 0.4 MG/1
0.4 TABLET SUBLINGUAL EVERY 5 MIN PRN
Status: DISCONTINUED | OUTPATIENT
Start: 2017-05-16 | End: 2017-05-23 | Stop reason: HOSPADM

## 2017-05-16 RX ORDER — GABAPENTIN 100 MG/1
200 CAPSULE ORAL 3 TIMES DAILY
Status: DISCONTINUED | OUTPATIENT
Start: 2017-05-16 | End: 2017-05-23 | Stop reason: HOSPADM

## 2017-05-16 RX ORDER — METOPROLOL TARTRATE 25 MG/1
25 TABLET, FILM COATED ORAL 2 TIMES DAILY
Status: DISCONTINUED | OUTPATIENT
Start: 2017-05-16 | End: 2017-05-17

## 2017-05-16 RX ORDER — ALUMINA, MAGNESIA, AND SIMETHICONE 2400; 2400; 240 MG/30ML; MG/30ML; MG/30ML
15-30 SUSPENSION ORAL EVERY 4 HOURS PRN
Status: DISCONTINUED | OUTPATIENT
Start: 2017-05-16 | End: 2017-05-23 | Stop reason: HOSPADM

## 2017-05-16 RX ADMIN — HEPARIN SODIUM 750 UNITS/HR: 10000 INJECTION, SOLUTION INTRAVENOUS at 09:44

## 2017-05-16 RX ADMIN — LEFLUNOMIDE 20 MG: 20 TABLET ORAL at 07:45

## 2017-05-16 RX ADMIN — PRAMIPEXOLE DIHYDROCHLORIDE 0.25 MG: 0.25 TABLET ORAL at 20:18

## 2017-05-16 RX ADMIN — GABAPENTIN 200 MG: 100 CAPSULE ORAL at 14:53

## 2017-05-16 RX ADMIN — Medication 3 MG: at 07:43

## 2017-05-16 RX ADMIN — METOPROLOL TARTRATE 25 MG: 25 TABLET ORAL at 20:19

## 2017-05-16 RX ADMIN — LISINOPRIL 30 MG: 20 TABLET ORAL at 07:44

## 2017-05-16 RX ADMIN — METOPROLOL TARTRATE 25 MG: 25 TABLET ORAL at 07:44

## 2017-05-16 RX ADMIN — GABAPENTIN 200 MG: 100 CAPSULE ORAL at 07:45

## 2017-05-16 RX ADMIN — ASPIRIN 325 MG ORAL TABLET 325 MG: 325 PILL ORAL at 07:43

## 2017-05-16 RX ADMIN — HYDROXYCHLOROQUINE SULFATE 200 MG: 200 TABLET, FILM COATED ENTERAL at 08:51

## 2017-05-16 RX ADMIN — METOPROLOL TARTRATE 5 MG: 5 INJECTION INTRAVENOUS at 22:56

## 2017-05-16 RX ADMIN — HUMAN ALBUMIN MICROSPHERES AND PERFLUTREN 4 ML: 10; .22 INJECTION, SOLUTION INTRAVENOUS at 07:36

## 2017-05-16 RX ADMIN — GABAPENTIN 200 MG: 100 CAPSULE ORAL at 20:18

## 2017-05-16 RX ADMIN — METOPROLOL TARTRATE 5 MG: 5 INJECTION INTRAVENOUS at 17:54

## 2017-05-16 RX ADMIN — METHOTREXATE 10 MG: 2.5 TABLET ORAL at 16:01

## 2017-05-16 ASSESSMENT — ACTIVITIES OF DAILY LIVING (ADL)
TRANSFERRING: 0-->INDEPENDENT
DRESS: 0-->INDEPENDENT
SWALLOWING: 0-->SWALLOWS FOODS/LIQUIDS WITHOUT DIFFICULTY
FALL_HISTORY_WITHIN_LAST_SIX_MONTHS: NO
BATHING: 0-->INDEPENDENT
COGNITION: 0 - NO COGNITION ISSUES REPORTED
TOILETING: 0-->INDEPENDENT
RETIRED_COMMUNICATION: 0-->UNDERSTANDS/COMMUNICATES WITHOUT DIFFICULTY
AMBULATION: 0-->INDEPENDENT
RETIRED_EATING: 0-->INDEPENDENT

## 2017-05-16 NOTE — PLAN OF CARE
Problem: Goal Outcome Summary  Goal: Goal Outcome Summary  Outcome: No Change  Pt admitted from the ED via bed.   Accompanied by: Spouse  Arrived on 6C at 1415  Pt belongings: Glasses, day and nighttime lymph wraps, and shoes left at bedside. Rest of personal belongings taken home with . Did not want anything sent down to security.  Teaching: Call don't fall, use of console, meal times, visiting hours, orientation to unit, when to call for the RN (angina/sob/dizzyness, etc.), and stressed the importance of safety.  Access: PIV x2  Telemetry: Placed on pt; Ht/Wt complete  Admission paperwork: Complete

## 2017-05-16 NOTE — H&P
History and Physical    Amelia Michel MRN# [id number]   Age: 56 year old YOB: 1960     Date of Admission: 5/15/2017    Primary care provider: Comfort Sabillon          Chief Complaint:   Tachycardia         History of Present Illness:   Amelia Michel is a 56 year old female with a hx of rheumatoid arthritis, VSD s/p repair, hypertension, and insomnia that presents with tachycardia.    The patient reports that she first noted fast heart rates ~ 3 weeks ago.  Rates were up to 150 and persisted for 48hrs then converted back to her normal heart rate ~75bpm.  She again developed tachycardia for ~36hrs last week.  Friday she again became tachycardic which persisted throughout the weekend.  Today since it had been persistent and recurrent and she was becoming quite fatigued she presented to the ED.     No associated chest pain though can tell when she is tachycardic b/c she has pressure over the carotid (something she had at baseline and attributes to her VSD).  Has diaphoresis though attributed to menopause.  Started CoQ10 ~ 3wks ago, but stopped after her first episode, no other medication changes.  Has some nausea and HA also.  No bowel or bladder changes.  No SOB, orthopnea, PND.        She does not appear to have pulmonary hypertension.  She received this dx many yrs ago and no one has ever been able to re-diagnose.  RHC in 2001 with mean PAP of 18.  She was seen in Dr. Mcnulty's clinic in 2015 and thought not to have any right heart failure.      In the ED she was noted to be in SVT with rates of 179, given adenosine and dilt with conversion to NSR at a rate of 105 bpm.   Upon seeing her she was again in a.fib with RVR rates of 100-130.  Rather asymptomatic otherwise.      ROS otherwise negative.          Past Medical History:     Past Medical History:   Diagnosis Date     HTN (hypertension)      Primary pulmonary hypertension (H)      Rheumatoid arthritis(714.0)    - Saddle anesthesia  (unclear etiology)  - Right LE neuropathy  - VSD s/p closure         Past Surgical History:      Past Surgical History:   Procedure Laterality Date     ARTHRODESIS WRIST  2000    Right wrist     FOOT SURGERY      4 left and 2 right     RELEASE CARPAL TUNNEL BILATERAL       REPAIR VENTRICULAR SEPTAL DEFECT  1969             Social History:     Social History   Substance Use Topics     Smoking status: Never Smoker     Smokeless tobacco: Never Used     Alcohol use Yes      Comment: Glass of wine two evenings a night   -   - No children  - Is a nurse at  in [employer]         Family History:     Family History   Problem Relation Age of Onset     Breast Cancer Mother      Hypertension Mother      Alzheimer Disease Mother      Hypertension Father      Blood Disease Father      Lymphoa     Circulatory Father      A Fib     DIABETES Paternal Grandmother    - Father  as result of stroke / a.fib          Allergies:     Allergies   Allergen Reactions     Penicillins      Tape [Adhesive Tape] Rash             Medications:     Current Facility-Administered Medications   Medication     diltiazem (CARDIZEM) injection 20 mg     metoprolol (LOPRESSOR) tablet 25 mg     Current Outpatient Prescriptions   Medication     Calcium Carb-Cholecalciferol 600-800 MG-UNIT TABS     Multiple Vitamins-Minerals (WOMENS MULTIVITAMIN PO)     Coenzyme Q10 (COQ-10 PO)     gabapentin (NEURONTIN) 100 MG capsule     traMADol (ULTRAM) 50 MG tablet     diphenoxylate-atropine (LOMOTIL) 2.5-0.025 MG per tablet     ASPIRIN PO     lisinopril (PRINIVIL,ZESTRIL) 30 MG tablet     hydroxychloroquine (PLAQUENIL) 200 MG tablet     leflunomide (ARAVA) 20 MG tablet     folic acid (FOLVITE) 1 MG tablet     predniSONE (DELTASONE) 1 MG tablet     METHOTREXate 2.5 MG tablet     pramipexole (MIRAPEX) 0.25 MG tablet          Physical Exam:   Vitals were reviewed  Blood pressure (!) 122/91, temperature 98.9  F (37.2  C), temperature source Oral, resp. rate  "22, height 1.473 m (4' 10\"), weight 63.3 kg (139 lb 9.6 oz), SpO2 100 %, not currently breastfeeding.    General: NAD  HEENT: Oral mucosa moist and non-erythematous, PERRL, EOM intact  Neck: Full ROM, no palpable LNs  CV: Irregularly irregular, tahcycardic, holosystolic murmur  Resp: CTAB, no wheezes or crackles  Abd: Soft, non-tender, BS+, no masses appreciated  Extremities: WWP, no pedal edema; ecchymosis over the right ankle  Neuro: AAOx3, no lateralizing symptoms or focal neurologic deficits           Data:     136  104    19   /  -----------------------    4.5    24   0.87  \         \  10.9  /  4.2    -------   100    Mg 2.2  INR 1.0    Echo 1/4/17  Interpretation Summary  No significant valvular abnormalities were noted.  Global and regional left ventricular function is normal with an EF of 55-60%.   Global right ventricular function is normal. Pulmonary artery systolic   pressure is normal. No pericardial effusion is present.    Cardiac MRI 5/14/17  IMPRESSION:  1. Normal left ventricular size and systolic function with a  calculated ejection fraction of 62 %.  2. Normal right ventricular size and systolic function with a  calculated ejection fraction of 50%.   3. On delayed enhancement imaging, there is mild midmyocardial  hyperenhancement in the basal septum  4. Prior sternotomy with history of VSD repair. The ventricular  septum appears intact.    [facility] 8/14/2001           Assessment and Plan:   Assessment:  Amelia Michel is a 56 year old female with a hx of rheumatoid arthritis, VSD s/p repair, hypertension, and insomnia that presents with tachycardia.    Plan:  # New atrial fibrillation w/ RVR  Pt presents with tachycardia to the 150s, improved with adenosine and then dilt.  Converted back to a.fib w/ RVR.  No clear ppt of the tachycardia. H/o of VSD could have lead to RA changes.  TSH wml.   Will attempt rate control overnight with BB.  Discuss AC in the AM, CHADS-VASC of 2.  May be good candidate " for NOAC.    - ASA 325mg  - Metoprolol 25mg BID  - Dilt 20mg IV x1, start gtt if persistent tachcardia despite BB  - NPO at MN for potential BRE and cardioversion  - Echo  - Telemetry  - Mg>2, K>4    # Rheumatoid arthitis  - Hydroxychloroquine 200mg BID m/w/f, 100mg qD T/Th/S/S  - Leflunomide 20mg qD  - Methotrexate 10mg qWk (due for dose 5/16)  - Prednisone 3mg qD    # Neuropathy  - Gabapentin 200mg TID  - Pramipexole 0.25mg     # Hypertension - Lisinopril 30mg qD    FEN: No IVF, cardiac diet, NPO at MN  Prophylaxis: SCDs, no PPI  Code Status: Full code (discussed with patient)  Disposition: Admit to cardiolgy    Patient will be formally staffed in the AM.     Reynold Benites MD, MPH  PGY-3, Internal Medicine  [phone]    I have seen and examined the patient and agree with the finding and plan.       Yaya Slaughter MD  Cardiology-[facility]  [phone]

## 2017-05-16 NOTE — ED NOTES
"ED to Floor Handoff      S:  Amelia Michel is a 56 year old female who speaks English and lives with a spouse,  in a home  They arrived in the ED by car from home with a complaint of Tachycardia    Initial vitals were:   BP: 121/77  Pulse: 103  Heart Rate: 200  Temp: 98.9  F (37.2  C)  Resp: 15  Height: 147.3 cm (4' 10\")  Weight: 63.3 kg (139 lb 9.6 oz)  SpO2: 98 %  Allergies:   Allergies   Allergen Reactions     Penicillins      Tape [Adhesive Tape] Rash   .  The meds given in the ED and their home medications are:   Current Facility-Administered Medications   Medication     aspirin tablet 325 mg     gabapentin (NEURONTIN) capsule 200 mg     [START ON 5/17/2017] hydroxychloroquine (PLAQUENIL) tablet 200 mg     lisinopril (PRINIVIL/ZESTRIL) tablet 30 mg     leflunomide (ARAVA) tablet 20 mg     pramipexole (MIRAPEX) tablet 0.25 mg     predniSONE (DELTASONE) half-tab 3 mg     lidocaine 1 % 1 mL     lidocaine (LMX4) kit     sodium chloride (PF) 0.9% PF flush 3 mL     sodium chloride (PF) 0.9% PF flush 3 mL     medication instruction     nitroglycerin (NITROSTAT) sublingual tablet 0.4 mg     alum & mag hydroxide-simethicone (MYLANTA ES/MAALOX  ES) suspension 15-30 mL     acetaminophen (TYLENOL) tablet 650 mg     acetaminophen (TYLENOL) Suppository 650 mg     methotrexate tablet CHEMO 10 mg     metoprolol (LOPRESSOR) tablet 25 mg     hydroxychloroquine (PLAQUENIL) tablet 200 mg     heparin  drip 25,000 units in 0.45% NaCl 250 mL (see additional administration details for dose)     heparin bolus from infusion pump     metoprolol (LOPRESSOR) injection 5 mg     Current Outpatient Prescriptions   Medication     Calcium Carb-Cholecalciferol 600-800 MG-UNIT TABS     Multiple Vitamins-Minerals (WOMENS MULTIVITAMIN PO)     Coenzyme Q10 (COQ-10 PO)     gabapentin (NEURONTIN) 100 MG capsule     traMADol (ULTRAM) 50 MG tablet     diphenoxylate-atropine (LOMOTIL) 2.5-0.025 MG per tablet     ASPIRIN PO     lisinopril " (PRINIVIL,ZESTRIL) 30 MG tablet     hydroxychloroquine (PLAQUENIL) 200 MG tablet     leflunomide (ARAVA) 20 MG tablet     folic acid (FOLVITE) 1 MG tablet     predniSONE (DELTASONE) 1 MG tablet     METHOTREXate 2.5 MG tablet     pramipexole (MIRAPEX) 0.25 MG tablet     Social demographics are   Social History     Social History     Marital status:      Spouse name: Romeo Michel     Number of children: 0     Years of education: N/A     Occupational History      [employer]     Social History Main Topics     Smoking status: Never Smoker     Smokeless tobacco: Never Used     Alcohol use Yes      Comment: Glass of wine two evenings a night     Drug use: No     Sexual activity: Not on file     Other Topics Concern     Parent/Sibling W/ Cabg, Mi Or Angioplasty Before 65f 55m? No     Social History Narrative       B:   The patient has been ill over the weekend and during this time the symptoms have increased.  In the ED was diagnosed with   Final diagnoses:   Tachycardia    Infection/sepsis suspected:No Isolation type; No active isolations   A:   In the ED these meds were given:   Medications   aspirin tablet 325 mg (325 mg Oral Given 5/16/17 0743)   gabapentin (NEURONTIN) capsule 200 mg (200 mg Oral Given 5/16/17 0745)   hydroxychloroquine (PLAQUENIL) tablet 200 mg (not administered)   lisinopril (PRINIVIL/ZESTRIL) tablet 30 mg (30 mg Oral Given 5/16/17 0744)   leflunomide (ARAVA) tablet 20 mg (20 mg Oral Given 5/16/17 0745)   pramipexole (MIRAPEX) tablet 0.25 mg (0.25 mg Oral Not Given 5/16/17 0749)   predniSONE (DELTASONE) half-tab 3 mg (3 mg Oral Given 5/16/17 0743)   lidocaine 1 % 1 mL (not administered)   lidocaine (LMX4) kit (not administered)   sodium chloride (PF) 0.9% PF flush 3 mL (not administered)   sodium chloride (PF) 0.9% PF flush 3 mL (not administered)   medication instruction (not administered)   nitroglycerin (NITROSTAT) sublingual tablet 0.4 mg (not administered)   alum &  mag hydroxide-simethicone (MYLANTA ES/MAALOX  ES) suspension 15-30 mL (not administered)   acetaminophen (TYLENOL) tablet 650 mg (not administered)   acetaminophen (TYLENOL) Suppository 650 mg (not administered)   methotrexate tablet CHEMO 10 mg (not administered)   metoprolol (LOPRESSOR) tablet 25 mg (25 mg Oral Given 5/16/17 0744)   hydroxychloroquine (PLAQUENIL) tablet 200 mg (200 mg Oral Given 5/16/17 0851)   heparin  drip 25,000 units in 0.45% NaCl 250 mL (see additional administration details for dose) (750 Units/hr Intravenous Rate/Dose Verify 5/16/17 1146)   heparin bolus from infusion pump (not administered)   metoprolol (LOPRESSOR) injection 5 mg (not administered)   adenosine (ADENOCARD) injection 6 mg (6 mg Intravenous Given 5/15/17 1805)   diltiazem (CARDIZEM) injection 20 mg (20 mg Intravenous Given 5/15/17 1808)   diltiazem (CARDIZEM) injection 20 mg (20 mg Intravenous Given 5/15/17 2236)   metoprolol (LOPRESSOR) tablet 25 mg (25 mg Oral Given 5/15/17 2344)   gabapentin (NEURONTIN) capsule 200 mg (200 mg Oral Given 5/15/17 2344)   perflutren diluted 1mL to 2mL with saline (OPTISON) diluted injection 4 mL (4 mLs Intravenous Given 5/16/17 0736)   heparin Loading Dose from infusion pump *Give when STARTING heparin infusion 3,800 Units (3,800 Units Intravenous Given 5/16/17 0943)     Drips running?  Yes  Labs results   Labs Ordered and Resulted from Time of ED Arrival Up to the Time of Departure from the ED   BASIC METABOLIC PANEL - Abnormal; Notable for the following:        Result Value    Glucose 103 (*)     All other components within normal limits   CBC WITH PLATELETS DIFFERENTIAL - Abnormal; Notable for the following:     RBC Count 3.04 (*)     Hemoglobin 10.9 (*)     Hematocrit 32.7 (*)      (*)     MCH 35.9 (*)     RDW 17.6 (*)     Platelet Count 100 (*)     All other components within normal limits   BASIC METABOLIC PANEL - Abnormal; Notable for the following:     Calcium 8.2 (*)     All  "other components within normal limits   CBC WITH PLATELETS - Abnormal; Notable for the following:     RBC Count 2.70 (*)     Hemoglobin 9.6 (*)     Hematocrit 29.0 (*)      (*)     MCH 35.6 (*)     RDW 18.0 (*)     Platelet Count 88 (*)     All other components within normal limits   INR POINT OF CARE   MAGNESIUM   TSH WITH FREE T4 REFLEX   MAGNESIUM   CBC WITH PLATELETS   ISTAT INR NURSING POCT   PLATELETS MONITORED PER HEPARIN TREATMENT PROTOCOL (FOR MEANINGFUL USE   IP ASSIGN PROVIDER TEAM TO TREATMENT TEAM   DAILY WEIGHTS   INTAKE AND OUTPUT   OBTAIN MEDICAL RECORDS   DOCUMENT   PATIENT EDUCATION   TELEMETRY MONITORING CARDIOLOGY ADULT   VITAL SIGNS AND PAIN ASSESSMENT   PULSE OXIMETRY NURSING   PERIPHERAL IV CATHETER   ACTIVITY   NOTIFY PROVIDER   APPLY PNEUMATIC COMPRESSION DEVICE (PCD)   NONE OF THE ABOVE CORE MEASURE DIAGNOSES     Imaging Studies: No results found for this or any previous visit (from the past 24 hour(s)).  Recent vital signs BP 95/59  Pulse 103  Temp 98.9  F (37.2  C) (Oral)  Resp 18  Ht 1.473 m (4' 10\")  Wt 63.3 kg (139 lb 9.6 oz)  SpO2 98%  BMI 29.18 kg/m2  Cardiac Rhythm: ,   Cardiac  Cardiac Rhythm: Atrial fibrillation  Abnormal labs/tests/findings requiring intervention:--- Heparin 10a - draw at 1545.    Pain control: pt had none  Nausea control: pt had none  R:   Transfer assistance needed: Independent  Family present during ED course? No   Family currently present? No  Pt needs tele? Yes  Code Status: Full Code  Tasks needing to be completed:---    Lorie box-- [id number] [phone] [facility]  [phone] [facility] ED        "

## 2017-05-16 NOTE — PHARMACY-ADMISSION MEDICATION HISTORY
Admission medication history interview status for the 5/15/2017 admission is complete. See Epic admission navigator for allergy information, pharmacy, prior to admission medications and immunization status.     Medication history interview sources:  patient    Changes made to PTA medication list (reason)  Added:   -Calcium + Vitamin D (600mg-800IU): 2 tablets by mouth every morning  -Women's Multi Vitamin: 1 tablet by mouth at bedtime  - CoQ-10: 1 tablet by mouth in the morning  Deleted:   -Calcium Carbonate (500MG): take by mouth daily  -Multivitamin (Thera-Vit-M): take 1 tablet by mouth daily  Changed:   -Lomotil (2.5 - 0.025 mg): Taking 0.5 tablet twice daily INSTEAD OF 4 times daily.  -Gabapentin: taking 2 capsules by mouth 4 times daily at 4am, 10am, 4pm, and 10pm INSTEAD OF 3 times daily.  -Hydroxychloroquine: taking 1 tablet  by mouth twice daily on Monday, Wednesday, Friday and 1 tablet once daily by mouth on Tuesday, Thursday, Saturday, Sunday INSTEAD  mg by mouth twice daily.    Additional medication history information (including reliability of information, actions taken by pharmacist):  - Patient is a reliable and accurate historian. 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  -Patient states she stopped taking pramipexole around April 11, 2017.  -Patient states she doesn't need the tramadol often. The last time she took a dose was mid-April 2017.  - Patient states she is taking her aspirin at bedtime, leflunomide in the morning, lisinopril at bedtime, multivitamin at bedtime, and prednisone in the morning.  - Patient's weekly methotrexate dose is due on Tuesday 5/16/17.  -All allergies accurate and up to date.      Prior to Admission medications    Medication Sig Last Dose Taking? Auth Provider   Calcium Carb-Cholecalciferol 600-800 MG-UNIT TABS Take 2 tablets by mouth every morning 5/14/2017 at 0800 Yes Unknown, Entered By History   Multiple Vitamins-Minerals (WOMENS MULTIVITAMIN PO) Take 1  tablet by mouth daily At bedtime 5/14/2017 at 2100 Yes Unknown, Entered By History   Coenzyme Q10 (COQ-10 PO) Take 1 tablet by mouth daily In the morning 5/14/2017 at 0800 Yes Unknown, Entered By History   gabapentin (NEURONTIN) 100 MG capsule Take 2 capsules (200 mg) by mouth 3 times daily  Patient taking differently: Take 200 mg by mouth Take 2 capsules (200 mg) by mouth every 6 hours (4 am, 10am, 4pm, 10pm) 5/15/2017 at 1600 Yes Chacha Ty MD   traMADol (ULTRAM) 50 MG tablet Take 50 mg by mouth Every 6 hours as needed for pain Past Month at Unknown time Yes Reported, Patient   diphenoxylate-atropine (LOMOTIL) 2.5-0.025 MG per tablet Take 0.5 tablet by mouth twice daily for diarrhea  5/15/2017 at 0800 Yes Reported, Patient   ASPIRIN PO Take 325 mg by mouth daily At bedtime 5/14/2017 at 2000 Yes Reported, Patient   lisinopril (PRINIVIL,ZESTRIL) 30 MG tablet Take 30 mg by mouth daily at bedtime 5/14/2017 at 2100 Yes Reported, Patient   hydroxychloroquine (PLAQUENIL) 200 MG tablet Take 1 tablet by mouth twice daily on Monday, Wednesday, and Friday and 1 tablet by mouth oncedaily on Tuesday, Thursday, Saturday, Sunday. 5/15/2017 at 0800 Yes Reported, Patient   leflunomide (ARAVA) 20 MG tablet Take 20 mg by mouth daily In the morning 5/15/2017 at 0800 Yes Reported, Patient   folic acid (FOLVITE) 1 MG tablet Take 1 mg by mouth daily. 5/15/2017 at 0800 Yes Reported, Patient   predniSONE (DELTASONE) 1 MG tablet Take 3 mg by mouth daily In the morning 5/15/2017 at 0800 Yes Reported, Patient   METHOTREXate 2.5 MG tablet Take 10 mg by mouth once a week On Tuesdays 5/9/2017 at Unknown time Yes Reported, Patient   pramipexole (MIRAPEX) 0.25 MG tablet Reported on 4/4/2017 4/11/2017  Reported, Patient         Medication history completed by: Jaqueline Royal, [email or web address]    I agree with the student's interview information and documentation.   Judith Ayala, Pharm.D.

## 2017-05-16 NOTE — PROGRESS NOTES
"[facility] Cardiology 1 Progress Note          Amelia Michel MRN: [id number]  1960  Date of Admission:5/15/2017  ___________________________________  Nurses/overnight notes reviewed.  Interval Hx:   Patient still in ED. HR's better controlled to low 100's with symptoms such as nausea, fatigue resolved. Patient still feels \"off\" with feeling heartbeat in carotids. Denies sob, cp or pressure, LE edema.     ROS: Negative except as noted in Interval History.    Medications:  Current Facility-Administered Medications   Medication     aspirin tablet 325 mg     gabapentin (NEURONTIN) capsule 200 mg     [START ON 5/17/2017] hydroxychloroquine (PLAQUENIL) tablet 200 mg     lisinopril (PRINIVIL/ZESTRIL) tablet 30 mg     leflunomide (ARAVA) tablet 20 mg     pramipexole (MIRAPEX) tablet 0.25 mg     predniSONE (DELTASONE) half-tab 3 mg     lidocaine 1 % 1 mL     lidocaine (LMX4) kit     sodium chloride (PF) 0.9% PF flush 3 mL     sodium chloride (PF) 0.9% PF flush 3 mL     medication instruction     nitroglycerin (NITROSTAT) sublingual tablet 0.4 mg     alum & mag hydroxide-simethicone (MYLANTA ES/MAALOX  ES) suspension 15-30 mL     acetaminophen (TYLENOL) tablet 650 mg     acetaminophen (TYLENOL) Suppository 650 mg     methotrexate tablet CHEMO 10 mg     metoprolol (LOPRESSOR) tablet 25 mg     hydroxychloroquine (PLAQUENIL) tablet 200 mg     heparin Loading Dose from infusion pump *Give when STARTING heparin infusion 3,800 Units     heparin  drip 25,000 units in 0.45% NaCl 250 mL (see additional administration details for dose)     heparin bolus from infusion pump     metoprolol (LOPRESSOR) injection 5 mg     Current Outpatient Prescriptions   Medication     Calcium Carb-Cholecalciferol 600-800 MG-UNIT TABS     Multiple Vitamins-Minerals (WOMENS MULTIVITAMIN PO)     Coenzyme Q10 (COQ-10 PO)     gabapentin (NEURONTIN) 100 MG capsule     traMADol (ULTRAM) 50 MG tablet     diphenoxylate-atropine (LOMOTIL) 2.5-0.025 MG per tablet "     ASPIRIN PO     lisinopril (PRINIVIL,ZESTRIL) 30 MG tablet     hydroxychloroquine (PLAQUENIL) 200 MG tablet     leflunomide (ARAVA) 20 MG tablet     folic acid (FOLVITE) 1 MG tablet     predniSONE (DELTASONE) 1 MG tablet     METHOTREXate 2.5 MG tablet     pramipexole (MIRAPEX) 0.25 MG tablet     Physical Examination:  Vitals:  Temp:  [98.9  F (37.2  C)] 98.9  F (37.2  C)  Pulse:  [103] 103  Heart Rate:  [] 102  Resp:  [11-24] 17  BP: ()/(53-99) 90/61  SpO2:  [95 %-100 %] 95 %  General: NAD  Neck: no JVD  CV: Irregularly irregular, tachycardic, holosystolic murmur  Resp: CTAB, no wheezes or crackles  Abd: Soft, non-tender, BS+, no masses appreciated  Extremities: WWP, no pedal edema; ecchymosis over the right ankle    Labs (Last four results): Reviewed, please see EPIC for complete details.   CMP  Recent Labs  Lab 05/16/17  0740 05/15/17  1752    136   POTASSIUM 4.0 4.5   CHLORIDE 108 104   CO2 25 24   ANIONGAP 5 7   GLC 82 103*   BUN 14 19   CR 0.82 0.87   GFRESTIMATED 72 67   GFRESTBLACK 88 81   VIKTORIYA 8.2* 9.2   MAG 1.9 2.2     CBC  Recent Labs  Lab 05/15/17  1752   WBC 4.2   RBC 3.04*   HGB 10.9*   HCT 32.7*   *   MCH 35.9*   MCHC 33.3   RDW 17.6*   *     INR  Recent Labs  Lab 05/15/17  1800   INR 1.0       EKG/strip results:   -none from today.    Radiology: Reviewed, please see EPIC for complete details.   -none from today.    Assessment/ Plan:  Assessment:  Amelia Michel is a 56 year old female with a hx of rheumatoid arthritis, VSD s/p repair 1969, hypertension, and insomnia that presents with tachycardia.     # New SVT, Afib  Symptomatic with feeling heart beat in carotids, nausea, fatigue. Has had similar episodes in past 3 weeks. Pt admitted with tachycardia to 170's, improved with adenosine and then dilt gtt with conversion to NSR. Also gave metoprolol 25 mg PO. Converted back to a.fib w/ RVR. No clear ppt of the tachycardia. TSH wnl. CHADS-VASC of 2. TTE on 5/16//17  with newly reduced LV and RV function LVEF 45-50% with mild MR and moderate TR. Rates currently controlled at 102bpm, though patient still feels her rapid HR.   - EP consult for possible antiarrhythmic agents  - ASA 325mg  - Metoprolol 25mg BID  - d/c diltiazem gtt  - NPO, on heparin for possible BRE/DCCV  - Telemetry  - Mg>2, K>4  - discuss AC    Chronic Issues  # Rheumatoid arthitis  - Hydroxychloroquine 200mg BID m/w/f, 100mg qD T/Th/S/S  - Leflunomide 20mg qD  - Methotrexate 10mg qWk (due for dose 5/16)  - Prednisone 3mg qD   # Neuropathy  - Gabapentin 200mg TID  - Pramipexole 0.25mg    # Hypertension - Lisinopril 30mg qD    #. FEN: NPO, electrolyte replacement  #. Prophylaxis: on heparin gtt    Code Status: full    Pt discussed with Dr. Slaughter.    Verna Caba MD, GIL  Internal Medicine, PGY-3  Cardiology 1 Service  [phone]      I have seen and examined the patient and agree with the finding and plan.       Yaya Slaughter MD  Cardiology-I  [phone]

## 2017-05-16 NOTE — CONSULTS
Electrophysiology Consultation Note   EP Attending: .   Reason for consultation: SVT.   Provider requesting consultation: , Cardiology I Service.  Date of Service: 5/16/2017      HPI:   Ms. Michel is a 56 year old female who has a past medical history significant for VSD s/p repair 1969, RA, HTN, and insomnia. She presented to [facility] reporting tachycardia and palpitations. Presenting 12 lead ECG shows AF. She was given IV adenosine and diltiazem in the ER. She organized to an AFL and continues to be in AFL. She reports se first developed symptoms of palpitations and tachycardia about 3 weeks ago. She noticed her HRs up to 150 bpm and this lasted for about 48 hours and then her HRs returned back to baseline. She then had another 36 hour episode. Then most recently on 5/12/17, she developed the same symptoms again but this persisted and she developed fatigue and nausea. This prompted her to come in for evaluation. An echo today shows LVEF 40-45%, mildly reduced RVEF, moderate biatrial enlargement, mild mitral regurgitation, and moderate tricuspid regurgitation. Prior CMRI from 2015 showed normal function and no significant valvular abnormalities. She denies any chest pain, dizziness, lightheadedness, shortness of breath, or syncopal symptoms. Telemetry shows 's. Renal function and electrolytes stable. She continues to endorse palpitations. She is hemodynamically stable. Current cardiac medications include: ASA and Lisinopril.   Past Medical History:   Past Medical History:   Diagnosis Date     HTN (hypertension)      Primary pulmonary hypertension (H)      Rheumatoid arthritis(714.0)      Past Surgical History:   Past Surgical History:   Procedure Laterality Date     ARTHRODESIS WRIST  2000    Right wrist     FOOT SURGERY      4 left and 2 right     RELEASE CARPAL TUNNEL BILATERAL       REPAIR VENTRICULAR SEPTAL DEFECT  1969     Allergies: Per MAR     Allergies   Allergen Reactions      Penicillins      Tape [Adhesive Tape] Rash     Medications:   Per MAR current outpatient cardiovascular medications include:   (Not in a hospital admission)  Current Outpatient Prescriptions   Medication Sig Dispense Refill     Calcium Carb-Cholecalciferol 600-800 MG-UNIT TABS Take 2 tablets by mouth every morning       Multiple Vitamins-Minerals (WOMENS MULTIVITAMIN PO) Take 1 tablet by mouth daily At bedtime       Coenzyme Q10 (COQ-10 PO) Take 1 tablet by mouth daily In the morning       gabapentin (NEURONTIN) 100 MG capsule Take 2 capsules (200 mg) by mouth 3 times daily (Patient taking differently: Take 200 mg by mouth Take 2 capsules (200 mg) by mouth every 6 hours (4 am, 10am, 4pm, 10pm)) 180 capsule 3     traMADol (ULTRAM) 50 MG tablet Take 50 mg by mouth Every 6 hours as needed for pain       diphenoxylate-atropine (LOMOTIL) 2.5-0.025 MG per tablet Take 0.5 tablets by mouth 2 times daily        ASPIRIN PO Take 325 mg by mouth daily At bedtime       lisinopril (PRINIVIL,ZESTRIL) 30 MG tablet Take 30 mg by mouth daily at bedtime       hydroxychloroquine (PLAQUENIL) 200 MG tablet Take 1 tablet by mouth twice daily on Monday, Wednesday, and Friday and 1 tablet once daily by mouth on Tuesday, Thursday, Saturday, Sunday.       leflunomide (ARAVA) 20 MG tablet Take 20 mg by mouth daily In the morning       folic acid (FOLVITE) 1 MG tablet Take 1 mg by mouth daily.       predniSONE (DELTASONE) 1 MG tablet Take 3 mg by mouth daily In the morning       METHOTREXate 2.5 MG tablet Take 10 mg by mouth once a week On Tuesdays       pramipexole (MIRAPEX) 0.25 MG tablet Reported on 4/4/2017       Current Facility-Administered Medications   Medication Dose Route Frequency     aspirin tablet 325 mg  325 mg Oral Daily     gabapentin  200 mg Oral TID     [START ON 5/17/2017] hydroxychloroquine  200 mg Oral 2 times per day on Mon Wed Sat     lisinopril  30 mg Oral Daily     leflunomide  20 mg Oral Daily     pramipexole  0.25 mg  "Oral TID     predniSONE  3 mg Oral Daily     sodium chloride (PF)  3 mL Intracatheter Q8H     methotrexate  10 mg Oral Weekly     metoprolol  25 mg Oral BID     hydroxychloroquine  200 mg Oral Once per day on Sun Tue Thu Sat     Family History:   Family History   Problem Relation Age of Onset     Breast Cancer Mother      Hypertension Mother      Alzheimer Disease Mother      Hypertension Father      Blood Disease Father      Lymphoa     Circulatory Father      A Fib     DIABETES Paternal Grandmother      Social History:   Social History   Substance Use Topics     Smoking status: Never Smoker     Smokeless tobacco: Never Used     Alcohol use Yes      Comment: Glass of wine two evenings a night       ROS:   A comprehensive 10 point ROS was negative other than as mentioned in HPI.    Physical Examination:   VITALS: BP 95/59  Pulse 103  Temp 98.9  F (37.2  C) (Oral)  Resp 18  Ht 1.473 m (4' 10\")  Wt 63.3 kg (139 lb 9.6 oz)  SpO2 98%  BMI 29.18 kg/m2  GENERAL APPEARANCE: AxO, NAD   HEENT: NCAT, EOMI, MMM. PERRLA.   NECK: Supple.    CHEST: CTAB   CARDIOVASCULAR: Regularly irregular, soft murmur.   ABDOMEN: NT, ND, BS+, soft   EXTREMITIES: No pedal edema. Distal pulses intact.   NEURO: Grossly nonfocal.   PSYCH: Normal affect.  SKIN: Warm and dry.   Data:   Labs:  BMP  Recent Labs  Lab 05/16/17  0740 05/15/17  1752    136   POTASSIUM 4.0 4.5   CHLORIDE 108 104   VIKTORIYA 8.2* 9.2   CO2 25 24   BUN 14 19   CR 0.82 0.87   GLC 82 103*     CBC  Recent Labs  Lab 05/16/17  1029 05/15/17  1752   WBC 4.7 4.2   RBC 2.70* 3.04*   HGB 9.6* 10.9*   HCT 29.0* 32.7*   * 108*   MCH 35.6* 35.9*   MCHC 33.1 33.3   RDW 18.0* 17.6*   PLT 88* 100*     INR  Recent Labs  Lab 05/15/17  1800   INR 1.0     No results found for: CKTOTAL, CKMB, TROPN  Cholesterol (mg/dL)   Date Value   04/29/2016 106   03/15/2016 84 (A)   02/04/2011 136   02/04/2011 136     Cholesterol/HDL Ratio (no units)   Date Value   02/04/2011 3.0   02/04/2011 " 3.0     HDL Cholesterol (mg/dL)   Date Value   2016 10 (L)   03/15/2016 2   2011 51   2011 51     LDL Cholesterol Calculated (mg/dL)   Date Value   2016 46   03/15/2016 25   2011 69   2011 69     EK/16/17 ECHO:   Interpretation Summary  Normal left ventricular size and thickness with mildly reduced systolic  function. EF 45-50% (traced at 48%).  S/P VSD repair. Ventricular septum appears intact by color Doppler.  Normal right ventricular size with mildly reduced function.  Moderate biatrial enlargement is present.  Mild mitral regurgitation and moderate tricuspid regurgitation. The mechanism  of tricuspid regurgitation appears to be tricuspid valve prolapse.  No pericardial effusion.     Compared to the prior study 2015 the LV and RV function are now reduced  and both the MR and most notably, the TR are new.  2015 CMRI:   IMPRESSION:  1. Normal left ventricular size and systolic function with a  calculated ejection fraction of 62 %.  2. Normal right ventricular size and systolic function with a  calculated ejection fraction of 50%.   3. On delayed enhancement imaging, there is mild midmyocardial  hyperenhancement in the basal septum  4. Prior sternotomy with history of VSD repair. The ventricular  septum appears intact.    Assessment:   Ms. Michel is a 56 year old female who has a past medical history significant for VSD s/p repair 1969, RA, HTN, and insomnia. She presented to [facility] reporting tachycardia and palpitations found to be in AF which organized to AFL. Has been having recurrent symptom over last 3 weeks lasting up to 48 hours at a time. An echo today shows LVEF 40-45%, mildly reduced RVEF, moderate biatrial enlargement, mild mitral regurgitation, and moderate tricuspid regurgitation. Prior CMRI from  showed normal function and no significant valvular abnormalities. She denies any chest pain, dizziness, lightheadedness, shortness of breath, or syncopal  symptoms. Telemetry shows 's. Renal function and electrolytes stable. She continues to endorse palpitations. She is hemodynamically stable. Current cardiac medications include: ASA and Lisinopril.   EP Recommendations:  We discussed in detail with the patient management/treatment options for AF/AFL includin. Stroke Prophylaxis:  CHADSVASC=+HTN, +gender  2, corresponding to a 2.2% annual stroke / systemic emolism event rate. indicating need for long term oral anticoagulation.    Anticoagulants include warfarin (Coumadin) and the noval oral anticoagulant agents: dabigatran (Pradaxa), rivaroxaban (Xarelto), and apixaban (Eliquis). The benefits of warfarin include: low cost, established track record in reduction of stroke and systemic embolism, the ability to reverse the agents, the ability to titrate the agent, and the ability to provide additional protection in subsets of patients who require anticoagulation for an independent process (i.e. prosthetic valve). The disadvantages of warfarin include: frequent phlebotomy for INRs, difficulty in regulation of INR [typically  60-65% of INRs within therapeutic range], and dietary constraints secondary to bioavailability. The benefits of the novel oral anticoagulants, as a class, include:  no phlebotomy, similar or significantly lower risk of stroke or systemic embolism depending on the agent, and similar or significantly lower risk of bleeding, particularly intracranial bleeding. The disadvantages of the novel agents, as a class, include: higher cost, the inability to reverse the agents (except Pradaxa), and  the need to be cautious in patients with CKD.   After a detailed discussion, we would recommend Eliquis 5 mg BID for anticoagulation.   2. Rate Control: can consider addition of BB as needed.   3. Rhythm Control: Given frequency and duration of symptoms over last few weeks, we would recommend starting an AAT. We discussed AAT options in detail. She does not  have any preclusions. We agreed to start Sotalol 80 mg BID; however, she should start this after BRE to r/o intracardiac thrombus. We can perform DCCV following BRE. She would then need to be inpatient for 5 monitor doses. QTc interval should be measured 2-3 hours after each dose for 5 monitored doses. If the QTc is >15% of baseline, or if the QTc is >500 msec (550 msec in patients with ventricular conduction abnormalities),Sotalol should be reduced by half or discontinued- call EP in this case.   4. Risk Factor Management:maintain tight BP control and PB evaluation as an outpatients.     At discharge we will have her follow up in 2 months with a repeat echo.      The patient states understanding and is agreeable with plan.   Thank you much for allowing us to participate in the care of this pleasant patient.     The patient was discussed w/ Dr. Eaton.  The above note reflects our joint plan.    ELIZABETH Verde CNP  Electrophysiology Consult Service  Pager: [phone]    EP STAFF NOTE  I have seen and examined the patient as part of a shared visit with GERA Verde NP.  I agree with the note above. I reviewed today's vital signs and medications. I have reviewed and discussed with the advanced practice provider their physical examination, assessment, and plan   Briefly, newly diagnosed Afib/AFL, symptomatic, recurrent and paroxysmal for at least the last 3 weeks.  My key history/exam findings are: AFL with 2:1 conduction..   The key management decisions made by me: Anticoagulation for CHADS VASC 2, sotalol, DCCV with BRE if persistent..    Juan Eaton MD [facility]  Cardiology - Electrophysiology

## 2017-05-17 ENCOUNTER — ANESTHESIA (OUTPATIENT)
Dept: SURGERY | Facility: CLINIC | Age: 57
DRG: 309 | End: 2017-05-17
Payer: COMMERCIAL

## 2017-05-17 ENCOUNTER — ANESTHESIA EVENT (OUTPATIENT)
Dept: SURGERY | Facility: CLINIC | Age: 57
DRG: 309 | End: 2017-05-17
Payer: COMMERCIAL

## 2017-05-17 ENCOUNTER — APPOINTMENT (OUTPATIENT)
Dept: CARDIOLOGY | Facility: CLINIC | Age: 57
DRG: 309 | End: 2017-05-17
Attending: STUDENT IN AN ORGANIZED HEALTH CARE EDUCATION/TRAINING PROGRAM
Payer: COMMERCIAL

## 2017-05-17 LAB
ANION GAP SERPL CALCULATED.3IONS-SCNC: 8 MMOL/L (ref 3–14)
BUN SERPL-MCNC: 17 MG/DL (ref 7–30)
CALCIUM SERPL-MCNC: 8.3 MG/DL (ref 8.5–10.1)
CHLORIDE SERPL-SCNC: 111 MMOL/L (ref 94–109)
CO2 SERPL-SCNC: 23 MMOL/L (ref 20–32)
CREAT SERPL-MCNC: 0.7 MG/DL (ref 0.52–1.04)
ERYTHROCYTE [DISTWIDTH] IN BLOOD BY AUTOMATED COUNT: 18 % (ref 10–15)
GFR SERPL CREATININE-BSD FRML MDRD: 86 ML/MIN/1.7M2
GLUCOSE SERPL-MCNC: 96 MG/DL (ref 70–99)
HCT VFR BLD AUTO: 30.8 % (ref 35–47)
HGB BLD-MCNC: 10 G/DL (ref 11.7–15.7)
LMWH PPP CHRO-ACNC: 0.51 IU/ML
LMWH PPP CHRO-ACNC: NORMAL IU/ML
MAGNESIUM SERPL-MCNC: 2.1 MG/DL (ref 1.6–2.3)
MAGNESIUM SERPL-MCNC: 2.1 MG/DL (ref 1.6–2.3)
MCH RBC QN AUTO: 35.1 PG (ref 26.5–33)
MCHC RBC AUTO-ENTMCNC: 32.5 G/DL (ref 31.5–36.5)
MCV RBC AUTO: 108 FL (ref 78–100)
PLATELET # BLD AUTO: 86 10E9/L (ref 150–450)
POTASSIUM BLD-SCNC: 4.2 MMOL/L (ref 3.4–5.3)
POTASSIUM SERPL-SCNC: 4 MMOL/L (ref 3.4–5.3)
RBC # BLD AUTO: 2.85 10E12/L (ref 3.8–5.2)
SODIUM SERPL-SCNC: 142 MMOL/L (ref 133–144)
WBC # BLD AUTO: 3.4 10E9/L (ref 4–11)

## 2017-05-17 PROCEDURE — 99152 MOD SED SAME PHYS/QHP 5/>YRS: CPT

## 2017-05-17 PROCEDURE — 93320 DOPPLER ECHO COMPLETE: CPT | Mod: 26 | Performed by: INTERNAL MEDICINE

## 2017-05-17 PROCEDURE — 25000131 ZZH RX MED GY IP 250 OP 636 PS 637: Performed by: STUDENT IN AN ORGANIZED HEALTH CARE EDUCATION/TRAINING PROGRAM

## 2017-05-17 PROCEDURE — 25000128 H RX IP 250 OP 636: Performed by: INTERNAL MEDICINE

## 2017-05-17 PROCEDURE — 85520 HEPARIN ASSAY: CPT | Performed by: INTERNAL MEDICINE

## 2017-05-17 PROCEDURE — 84132 ASSAY OF SERUM POTASSIUM: CPT | Performed by: INTERNAL MEDICINE

## 2017-05-17 PROCEDURE — 37000008 ZZH ANESTHESIA TECHNICAL FEE, 1ST 30 MIN

## 2017-05-17 PROCEDURE — 93005 ELECTROCARDIOGRAM TRACING: CPT

## 2017-05-17 PROCEDURE — 25000132 ZZH RX MED GY IP 250 OP 250 PS 637: Performed by: STUDENT IN AN ORGANIZED HEALTH CARE EDUCATION/TRAINING PROGRAM

## 2017-05-17 PROCEDURE — 25000128 H RX IP 250 OP 636: Performed by: NURSE ANESTHETIST, CERTIFIED REGISTERED

## 2017-05-17 PROCEDURE — 36415 COLL VENOUS BLD VENIPUNCTURE: CPT | Performed by: STUDENT IN AN ORGANIZED HEALTH CARE EDUCATION/TRAINING PROGRAM

## 2017-05-17 PROCEDURE — 25000125 ZZHC RX 250: Performed by: NURSE ANESTHETIST, CERTIFIED REGISTERED

## 2017-05-17 PROCEDURE — 93325 DOPPLER ECHO COLOR FLOW MAPG: CPT | Mod: 26 | Performed by: INTERNAL MEDICINE

## 2017-05-17 PROCEDURE — 25000128 H RX IP 250 OP 636: Performed by: STUDENT IN AN ORGANIZED HEALTH CARE EDUCATION/TRAINING PROGRAM

## 2017-05-17 PROCEDURE — 36415 COLL VENOUS BLD VENIPUNCTURE: CPT | Performed by: INTERNAL MEDICINE

## 2017-05-17 PROCEDURE — 25000125 ZZHC RX 250: Performed by: STUDENT IN AN ORGANIZED HEALTH CARE EDUCATION/TRAINING PROGRAM

## 2017-05-17 PROCEDURE — 93320 DOPPLER ECHO COMPLETE: CPT

## 2017-05-17 PROCEDURE — 80048 BASIC METABOLIC PNL TOTAL CA: CPT | Performed by: STUDENT IN AN ORGANIZED HEALTH CARE EDUCATION/TRAINING PROGRAM

## 2017-05-17 PROCEDURE — 3E043XZ INTRODUCTION OF VASOPRESSOR INTO CENTRAL VEIN, PERCUTANEOUS APPROACH: ICD-10-PCS | Performed by: INTERNAL MEDICINE

## 2017-05-17 PROCEDURE — 85027 COMPLETE CBC AUTOMATED: CPT | Performed by: STUDENT IN AN ORGANIZED HEALTH CARE EDUCATION/TRAINING PROGRAM

## 2017-05-17 PROCEDURE — 83735 ASSAY OF MAGNESIUM: CPT | Performed by: INTERNAL MEDICINE

## 2017-05-17 PROCEDURE — 76376 3D RENDER W/INTRP POSTPROCES: CPT

## 2017-05-17 PROCEDURE — 5A2204Z RESTORATION OF CARDIAC RHYTHM, SINGLE: ICD-10-PCS | Performed by: INTERNAL MEDICINE

## 2017-05-17 PROCEDURE — 92960 CARDIOVERSION ELECTRIC EXT: CPT | Performed by: INTERNAL MEDICINE

## 2017-05-17 PROCEDURE — 83735 ASSAY OF MAGNESIUM: CPT | Performed by: STUDENT IN AN ORGANIZED HEALTH CARE EDUCATION/TRAINING PROGRAM

## 2017-05-17 PROCEDURE — 92960 CARDIOVERSION ELECTRIC EXT: CPT

## 2017-05-17 PROCEDURE — 25000128 H RX IP 250 OP 636

## 2017-05-17 PROCEDURE — 93010 ELECTROCARDIOGRAM REPORT: CPT | Mod: 76 | Performed by: INTERNAL MEDICINE

## 2017-05-17 PROCEDURE — 93312 ECHO TRANSESOPHAGEAL: CPT | Mod: 26 | Performed by: INTERNAL MEDICINE

## 2017-05-17 PROCEDURE — 25000125 ZZHC RX 250: Performed by: INTERNAL MEDICINE

## 2017-05-17 PROCEDURE — 21400006 ZZH R&B CCU INTERMEDIATE UMMC

## 2017-05-17 PROCEDURE — 37000009 ZZH ANESTHESIA TECHNICAL FEE, EACH ADDTL 15 MIN

## 2017-05-17 PROCEDURE — 99233 SBSQ HOSP IP/OBS HIGH 50: CPT | Mod: 25 | Performed by: INTERNAL MEDICINE

## 2017-05-17 PROCEDURE — 25000132 ZZH RX MED GY IP 250 OP 250 PS 637: Performed by: INTERNAL MEDICINE

## 2017-05-17 PROCEDURE — 85520 HEPARIN ASSAY: CPT | Performed by: STUDENT IN AN ORGANIZED HEALTH CARE EDUCATION/TRAINING PROGRAM

## 2017-05-17 RX ORDER — FENTANYL CITRATE 50 UG/ML
50-100 INJECTION, SOLUTION INTRAMUSCULAR; INTRAVENOUS
Status: DISCONTINUED | OUTPATIENT
Start: 2017-05-17 | End: 2017-05-17 | Stop reason: HOSPADM

## 2017-05-17 RX ORDER — CALCIUM CHLORIDE 100 MG/ML
INJECTION INTRAVENOUS; INTRAVENTRICULAR PRN
Status: DISCONTINUED | OUTPATIENT
Start: 2017-05-17 | End: 2017-05-17

## 2017-05-17 RX ORDER — METOPROLOL TARTRATE 50 MG
50 TABLET ORAL 2 TIMES DAILY
Status: DISCONTINUED | OUTPATIENT
Start: 2017-05-17 | End: 2017-05-17

## 2017-05-17 RX ORDER — POTASSIUM CHLORIDE 750 MG/1
20 TABLET, EXTENDED RELEASE ORAL
Status: CANCELLED | OUTPATIENT
Start: 2017-05-17

## 2017-05-17 RX ORDER — DIPHENOXYLATE HCL/ATROPINE 2.5-.025MG
0.5 TABLET ORAL 2 TIMES DAILY
Status: DISCONTINUED | OUTPATIENT
Start: 2017-05-17 | End: 2017-05-17 | Stop reason: CLARIF

## 2017-05-17 RX ORDER — DIPHENOXYLATE HCL/ATROPINE 2.5-.025/5
2.5 LIQUID (ML) ORAL 2 TIMES DAILY
Status: DISCONTINUED | OUTPATIENT
Start: 2017-05-17 | End: 2017-05-23 | Stop reason: HOSPADM

## 2017-05-17 RX ORDER — NALOXONE HYDROCHLORIDE 0.4 MG/ML
.1-.4 INJECTION, SOLUTION INTRAMUSCULAR; INTRAVENOUS; SUBCUTANEOUS
Status: DISCONTINUED | OUTPATIENT
Start: 2017-05-17 | End: 2017-05-17 | Stop reason: HOSPADM

## 2017-05-17 RX ORDER — LISINOPRIL 10 MG/1
10 TABLET ORAL DAILY
Status: DISCONTINUED | OUTPATIENT
Start: 2017-05-17 | End: 2017-05-17

## 2017-05-17 RX ORDER — FLUMAZENIL 0.1 MG/ML
0.2 INJECTION, SOLUTION INTRAVENOUS
Status: DISCONTINUED | OUTPATIENT
Start: 2017-05-17 | End: 2017-05-17 | Stop reason: HOSPADM

## 2017-05-17 RX ORDER — POTASSIUM CHLORIDE 1500 MG/1
20 TABLET, EXTENDED RELEASE ORAL
Status: DISCONTINUED | OUTPATIENT
Start: 2017-05-17 | End: 2017-05-17 | Stop reason: HOSPADM

## 2017-05-17 RX ORDER — POTASSIUM CHLORIDE 750 MG/1
40 TABLET, EXTENDED RELEASE ORAL
Status: CANCELLED | OUTPATIENT
Start: 2017-05-17

## 2017-05-17 RX ORDER — POTASSIUM CHLORIDE 1500 MG/1
40 TABLET, EXTENDED RELEASE ORAL
Status: DISCONTINUED | OUTPATIENT
Start: 2017-05-17 | End: 2017-05-17 | Stop reason: HOSPADM

## 2017-05-17 RX ORDER — ATROPINE SULFATE 0.1 MG/ML
.5-1 INJECTION INTRAVENOUS
Status: DISCONTINUED | OUTPATIENT
Start: 2017-05-17 | End: 2017-05-17 | Stop reason: HOSPADM

## 2017-05-17 RX ORDER — HYDROXYCHLOROQUINE SULFATE 200 MG/1
200 TABLET, FILM COATED ORAL
Status: DISCONTINUED | OUTPATIENT
Start: 2017-05-17 | End: 2017-05-23 | Stop reason: HOSPADM

## 2017-05-17 RX ORDER — SODIUM CHLORIDE, SODIUM LACTATE, POTASSIUM CHLORIDE, CALCIUM CHLORIDE 600; 310; 30; 20 MG/100ML; MG/100ML; MG/100ML; MG/100ML
INJECTION, SOLUTION INTRAVENOUS CONTINUOUS PRN
Status: DISCONTINUED | OUTPATIENT
Start: 2017-05-17 | End: 2017-05-17

## 2017-05-17 RX ORDER — SODIUM CHLORIDE 9 MG/ML
1000 INJECTION, SOLUTION INTRAVENOUS CONTINUOUS
Status: DISCONTINUED | OUTPATIENT
Start: 2017-05-17 | End: 2017-05-17 | Stop reason: HOSPADM

## 2017-05-17 RX ORDER — FENTANYL CITRATE 50 UG/ML
25-50 INJECTION, SOLUTION INTRAMUSCULAR; INTRAVENOUS
Status: DISCONTINUED | OUTPATIENT
Start: 2017-05-17 | End: 2017-05-17 | Stop reason: HOSPADM

## 2017-05-17 RX ORDER — ATROPINE SULFATE 0.1 MG/ML
INJECTION INTRAVENOUS PRN
Status: DISCONTINUED | OUTPATIENT
Start: 2017-05-17 | End: 2017-05-17

## 2017-05-17 RX ORDER — SODIUM CHLORIDE 9 MG/ML
INJECTION, SOLUTION INTRAVENOUS
Status: COMPLETED
Start: 2017-05-17 | End: 2017-05-17

## 2017-05-17 RX ADMIN — SODIUM CHLORIDE 250 ML: 0.9 INJECTION, SOLUTION INTRAVENOUS at 14:37

## 2017-05-17 RX ADMIN — GABAPENTIN 200 MG: 100 CAPSULE ORAL at 20:31

## 2017-05-17 RX ADMIN — CALCIUM CHLORIDE 100 G: 100 INJECTION, SOLUTION INTRAVENOUS at 10:32

## 2017-05-17 RX ADMIN — PHENYLEPHRINE HYDROCHLORIDE 100 MCG: 10 INJECTION, SOLUTION INTRAMUSCULAR; INTRAVENOUS; SUBCUTANEOUS at 09:48

## 2017-05-17 RX ADMIN — CALCIUM CHLORIDE 100 G: 100 INJECTION, SOLUTION INTRAVENOUS at 10:33

## 2017-05-17 RX ADMIN — GABAPENTIN 200 MG: 100 CAPSULE ORAL at 07:43

## 2017-05-17 RX ADMIN — DIPHENOXYLATE HYDROCHLORIDE AND ATROPINE SULFATE 0.5 TABLET: 2.5; .025 TABLET ORAL at 22:05

## 2017-05-17 RX ADMIN — CALCIUM CHLORIDE 100 G: 100 INJECTION, SOLUTION INTRAVENOUS at 10:35

## 2017-05-17 RX ADMIN — TOPICAL ANESTHETIC 1 SPRAY: 200 SPRAY DENTAL; PERIODONTAL at 08:57

## 2017-05-17 RX ADMIN — VASOPRESSIN 1 UNITS: 20 INJECTION, SOLUTION INTRAMUSCULAR; SUBCUTANEOUS at 09:55

## 2017-05-17 RX ADMIN — METOPROLOL TARTRATE 5 MG: 5 INJECTION INTRAVENOUS at 00:44

## 2017-05-17 RX ADMIN — MIDAZOLAM 3 MG: 1 INJECTION INTRAMUSCULAR; INTRAVENOUS at 09:11

## 2017-05-17 RX ADMIN — LIDOCAINE HYDROCHLORIDE 30 ML: 20 SOLUTION ORAL; TOPICAL at 08:57

## 2017-05-17 RX ADMIN — VASOPRESSIN 1 UNITS: 20 INJECTION, SOLUTION INTRAMUSCULAR; SUBCUTANEOUS at 10:10

## 2017-05-17 RX ADMIN — ACETAMINOPHEN 650 MG: 325 TABLET, FILM COATED ORAL at 23:03

## 2017-05-17 RX ADMIN — SODIUM CHLORIDE 250 ML: 9 INJECTION, SOLUTION INTRAVENOUS at 14:37

## 2017-05-17 RX ADMIN — ASPIRIN 325 MG ORAL TABLET 325 MG: 325 PILL ORAL at 07:43

## 2017-05-17 RX ADMIN — HEPARIN SODIUM 1200 UNITS/HR: 10000 INJECTION, SOLUTION INTRAVENOUS at 08:42

## 2017-05-17 RX ADMIN — LEFLUNOMIDE 20 MG: 20 TABLET ORAL at 07:44

## 2017-05-17 RX ADMIN — SODIUM CHLORIDE, POTASSIUM CHLORIDE, SODIUM LACTATE AND CALCIUM CHLORIDE: 600; 310; 30; 20 INJECTION, SOLUTION INTRAVENOUS at 09:40

## 2017-05-17 RX ADMIN — PHENYLEPHRINE HYDROCHLORIDE 100 MCG: 10 INJECTION, SOLUTION INTRAMUSCULAR; INTRAVENOUS; SUBCUTANEOUS at 09:45

## 2017-05-17 RX ADMIN — ATROPINE SULFATE 0.6 MG: 0.1 INJECTION, SOLUTION ENDOTRACHEAL; INTRAMUSCULAR; INTRAVENOUS; SUBCUTANEOUS at 11:10

## 2017-05-17 RX ADMIN — ATROPINE SULFATE 0.4 MG: 0.1 INJECTION, SOLUTION ENDOTRACHEAL; INTRAMUSCULAR; INTRAVENOUS; SUBCUTANEOUS at 11:06

## 2017-05-17 RX ADMIN — METHOHEXITAL SODIUM 35 MG: 500 INJECTION, POWDER, LYOPHILIZED, FOR SOLUTION INTRAMUSCULAR; INTRAVENOUS; RECTAL at 09:43

## 2017-05-17 RX ADMIN — SODIUM CHLORIDE 250 ML: 9 INJECTION, SOLUTION INTRAVENOUS at 14:39

## 2017-05-17 RX ADMIN — FENTANYL CITRATE 50 MCG: 50 INJECTION, SOLUTION INTRAMUSCULAR; INTRAVENOUS at 09:08

## 2017-05-17 RX ADMIN — METOPROLOL TARTRATE 5 MG: 5 INJECTION INTRAVENOUS at 00:35

## 2017-05-17 RX ADMIN — VASOPRESSIN 1 UNITS: 20 INJECTION, SOLUTION INTRAMUSCULAR; SUBCUTANEOUS at 09:46

## 2017-05-17 RX ADMIN — Medication 3 MG: at 07:46

## 2017-05-17 RX ADMIN — METOPROLOL TARTRATE 50 MG: 50 TABLET, FILM COATED ORAL at 07:44

## 2017-05-17 RX ADMIN — ACETAMINOPHEN 650 MG: 325 TABLET, FILM COATED ORAL at 12:42

## 2017-05-17 RX ADMIN — HYDROXYCHLOROQUINE SULFATE 200 MG: 200 TABLET, FILM COATED ENTERAL at 20:31

## 2017-05-17 RX ADMIN — ACETAMINOPHEN 650 MG: 325 TABLET, FILM COATED ORAL at 00:36

## 2017-05-17 RX ADMIN — GABAPENTIN 200 MG: 100 CAPSULE ORAL at 13:49

## 2017-05-17 RX ADMIN — HYDROXYCHLOROQUINE SULFATE 200 MG: 200 TABLET, FILM COATED ENTERAL at 12:58

## 2017-05-17 RX ADMIN — HEPARIN SODIUM 900 UNITS/HR: 10000 INJECTION, SOLUTION INTRAVENOUS at 04:53

## 2017-05-17 RX ADMIN — VASOPRESSIN 1 UNITS: 20 INJECTION, SOLUTION INTRAMUSCULAR; SUBCUTANEOUS at 10:55

## 2017-05-17 RX ADMIN — Medication 2.5 ML: at 23:02

## 2017-05-17 RX ADMIN — VASOPRESSIN 1 UNITS: 20 INJECTION, SOLUTION INTRAMUSCULAR; SUBCUTANEOUS at 10:25

## 2017-05-17 RX ADMIN — CALCIUM CHLORIDE 100 G: 100 INJECTION, SOLUTION INTRAVENOUS at 10:34

## 2017-05-17 ASSESSMENT — NEW YORK HEART ASSOCIATION (NYHA) CLASSIFICATION: NYHA FUNCTIONAL CLASS: II

## 2017-05-17 ASSESSMENT — PAIN DESCRIPTION - DESCRIPTORS: DESCRIPTORS: ACHING

## 2017-05-17 ASSESSMENT — ENCOUNTER SYMPTOMS: DYSRHYTHMIAS: 1

## 2017-05-17 NOTE — ANESTHESIA CARE TRANSFER NOTE
Patient: Amelia Michel    Procedure(s):  ANESTHESIA CARDIOVERSION    Diagnosis: A fib   Diagnosis Additional Information: No value filed.    Anesthesia Type:   No value filed.     Note:  Airway :Nasal Cannula  Patient transferred to:ICU  Comments: Awake, good patent airway, vss. Dr. Angela at bedside.  Report given to RN. Pt is being admitted to .      Vitals: (Last set prior to Anesthesia Care Transfer)    CRNA VITALS  5/17/2017 1050 - 5/17/2017 1120      5/17/2017             NIBP: (!)  83/64    Pulse: 61    NIBP Mean: 70    Ht Rate: 61                Electronically Signed By: ELIZABETH Obrien CRNA  May 17, 2017  11:20 AM

## 2017-05-17 NOTE — PLAN OF CARE
Problem: Goal Outcome Summary  Goal: Goal Outcome Summary  Outcome: No Change  Pt continues to have short runs of  to 140, 1 episode of sustained for which she was given prn lopressor for, with reduction of rate to 100 to 120's. Pt denies any chest pain/pressure thus far this shift.  Pt up to bathroom with standby assist.

## 2017-05-17 NOTE — PLAN OF CARE
Problem: Arrhythmia/Dysrhythmia (Symptomatic) (Adult)  Goal: Signs and Symptoms of Listed Potential Problems Will be Absent or Manageable (Arrhythmia/Dysrhythmia)  Signs and symptoms of listed potential problems will be absent or manageable by discharge/transition of care (reference Arrhythmia/Dysrhythmia (Symptomatic) (Adult) CPG).   Outcome: Improving  Patient arrived s/p BRE for cardioversion for Afib RVR at 11:45 am.  Patient alert and oriented x4, calm and cooperative, following commands appropriately.  Patient hemodynamically stable and not requiring pressor support with VS:     MAPs of 60  SpO2 98% on 2L NC  HR 60  NSR with frequent PVCs      IV heparin running at 1200 units/hr     Plan is to observe patient for arrhythmias and replace some fluid.     Will notify MD with issues

## 2017-05-17 NOTE — ANESTHESIA PREPROCEDURE EVALUATION
Anesthesia Evaluation     . Pt has had prior anesthetic. Type: General and MAC           ROS/MED HX    ENT/Pulmonary:  - neg pulmonary ROS     Neurologic:  - neg neurologic ROS     Cardiovascular:     (+) ----. : . CHF etiology: a fib Last EF: 50% date: mAY 17 NYHA classification: II. . :. dysrhythmias a-fib, Irregular Heartbeat/Palpitations, valvular problems/murmurs tr:.       METS/Exercise Tolerance:     Hematologic:  - neg hematologic  ROS       Musculoskeletal:   (+) arthritis, , , other musculoskeletal- RA      GI/Hepatic:  - neg GI/hepatic ROS       Renal/Genitourinary:  - ROS Renal section negative       Endo:     (+) Chronic steroid usage for Arthritis .      Psychiatric:  - neg psychiatric ROS       Infectious Disease:  - neg infectious disease ROS       Malignancy:      - no malignancy   Other:                     Physical Exam  Normal systems: pulmonary and dental    Airway   Mallampati: II  TM distance: >3 FB  Neck ROM: full    Dental     Cardiovascular   Rhythm and rate: irregular and abnormal      Pulmonary                     Anesthesia Plan      History & Physical Review  History and physical reviewed and following examination; no interval change.    ASA Status:  3 .    NPO Status:  > 8 hours    Plan for General with Other induction. Maintenance will be TIVA.    PONV prophylaxis:  Ondansetron (or other 5HT-3)       Postoperative Care      Consents  Anesthetic plan, risks, benefits and alternatives discussed with:  Patient.  Use of blood products discussed: Yes.   .

## 2017-05-17 NOTE — ANESTHESIA POSTPROCEDURE EVALUATION
Patient: Amelia Michel    Procedure(s):  ANESTHESIA CARDIOVERSION    Diagnosis:A fib   Diagnosis Additional Information: No value filed.    Anesthesia Type:  No value filed.    Note:  Anesthesia Post Evaluation    Patient location during evaluation: Echo Lab  Patient participation: Able to fully participate in evaluation  Level of consciousness: awake and alert  Pain management: satisfactory to patient  Airway patency: patent  Cardiovascular status: hypotensive  Respiratory status: nasal cannula  Hydration status: euvolemic  PONV: controlled     Anesthetic complications: None    Comments: Pt was borderline hypotensive at time of cardioversion.  States she's very sensitive to antihypertensives.  Cardioversion uneventful.    SBP in 70's with HR 55 post-cardioversion.  Vasopressors required to normalize BP.  Cardiologist directed 1.0 mg atropine for bradycardia which had no effect.  Decision for ICU admission for excessive Beta blockade was made.  Pt alert, oriented and comfortable throughout.        Last vitals:  Vitals:    05/17/17 1145 05/17/17 1157 05/17/17 1200   BP: (!) 92/34     Pulse: 63     Resp:      Temp: 36.8  C (98.2  F)     SpO2: 99% 100% 98%         Electronically Signed By: Victor Manuel Reid MD  May 17, 2017  12:35 PM

## 2017-05-17 NOTE — PROGRESS NOTES
Pt arrived in ECHO department for scheduled BRE.   Procedure explained, questions answered and consent signed. Discharge instructions discussed with patient.  Hep gtt <0.10; bolus given and infusion increased per orders prior to start of BRE. Repeat Hep Xa level scheduled for 3pm.   Pt's throat sprayed at 09:00, therefore pt will not be able to eat or drink until 2 hours after at 11:00. Informed pt of this time and encouraged to start with warm fluids and soft foods.    Pt tolerated procedure well, and was given a total of 50 mcg IV fentanyl and 3 mg IV versed for conscious sedation. Pt denied any chest or throat pain after BRE.   BRE probe #3 used for procedure and inserted without difficulty.  No clots visualized on BRE so proceeded with DCCV. Anesthesia gave pt 35 mg IV brevitol for sedation and pt was DCCV at 100 Joules to a SB in the 50's. Pre and post EKG completed and placed in chart. Post DCCV, pt hypotensive (60's-70's/20-40's); given total of phenylephrine 200mcg, 5 units vasopressin, 400mg calcium chloride, and 1mg Atropine by anesthesia. Given 1 L NS bolus. Pressure would go up to 90's/50's, but quickly drop down again. Pt was awake and talking throughout hypotensive period, but pale and diaphoretic. She denied dizziness or nausea. Decision made to send pt to 4E for BP support. Escorted to 4E on monitor with Float nurse.   Report given to 6C and 4E RN.

## 2017-05-17 NOTE — PROGRESS NOTES
[facility] Cardiology 1 Progress Note          Amelia Michel MRN: [id number]  1960  Date of Admission:5/15/2017  ___________________________________  Nurses/overnight notes reviewed.  Interval Hx:   No acute events overnight. Patient NPO for BRE/DCCV. Denies sob, cp or pressure, LE edema.     ROS: Negative except as noted in Interval History.    Medications:  Current Facility-Administered Medications   Medication     metoprolol (LOPRESSOR) tablet 50 mg     lisinopril (PRINIVIL/ZESTRIL) tablet 10 mg     aspirin tablet 325 mg     gabapentin (NEURONTIN) capsule 200 mg     hydroxychloroquine (PLAQUENIL) tablet 200 mg     leflunomide (ARAVA) tablet 20 mg     pramipexole (MIRAPEX) tablet 0.25 mg     predniSONE (DELTASONE) half-tab 3 mg     lidocaine 1 % 1 mL     lidocaine (LMX4) kit     sodium chloride (PF) 0.9% PF flush 3 mL     sodium chloride (PF) 0.9% PF flush 3 mL     medication instruction     nitroglycerin (NITROSTAT) sublingual tablet 0.4 mg     alum & mag hydroxide-simethicone (MYLANTA ES/MAALOX  ES) suspension 15-30 mL     acetaminophen (TYLENOL) tablet 650 mg     acetaminophen (TYLENOL) Suppository 650 mg     methotrexate tablet CHEMO 10 mg     hydroxychloroquine (PLAQUENIL) tablet 200 mg     heparin  drip 25,000 units in 0.45% NaCl 250 mL (see additional administration details for dose)     heparin bolus from infusion pump     metoprolol (LOPRESSOR) injection 5 mg     Physical Examination:  Vitals:  Temp:  [98.2  F (36.8  C)-100.3  F (37.9  C)] 98.2  F (36.8  C)  Pulse:  [103] 103  Heart Rate:  [100-135] 100  Resp:  [12-20] 16  BP: ()/(41-79) 89/65  SpO2:  [94 %-98 %] 96 %  General: NAD  Neck: no JVD  CV: Irregularly irregular, tachycardic, holosystolic murmur  Resp: CTAB, no wheezes or crackles  Abd: Soft, non-tender, BS+, no masses appreciated  Extremities: WWP, no pedal edema; ecchymosis over the right ankle    Labs (Last four results): Reviewed, please see EPIC for complete details.   CMP    Recent  Labs  Lab 05/16/17  0740 05/15/17  1752    136   POTASSIUM 4.0 4.5   CHLORIDE 108 104   CO2 25 24   ANIONGAP 5 7   GLC 82 103*   BUN 14 19   CR 0.82 0.87   GFRESTIMATED 72 67   GFRESTBLACK 88 81   VIKTORIYA 8.2* 9.2   MAG 1.9 2.2     CBC    Recent Labs  Lab 05/16/17  1029 05/15/17  1752   WBC 4.7 4.2   RBC 2.70* 3.04*   HGB 9.6* 10.9*   HCT 29.0* 32.7*   * 108*   MCH 35.6* 35.9*   MCHC 33.1 33.3   RDW 18.0* 17.6*   PLT 88* 100*     INR    Recent Labs  Lab 05/15/17  1800   INR 1.0       EKG/strip results:   -none from today.    Radiology: Reviewed, please see EPIC for complete details.   -none from today.    Assessment/ Plan:  Assessment:  Amelia Michel is a 56 year old female with a hx of rheumatoid arthritis, VSD s/p repair 1969, hypertension, and insomnia that presents with tachycardia.     # New Afib-->AFL  Symptomatic with feeling heart beat in carotids, nausea, fatigue. Has had similar episodes in past 3 weeks. Pt admitted with tachycardia to 170's, improved with adenosine and then dilt gtt with conversion to NSR. Also gave metoprolol 25 mg PO. Converted back to a.fib w/ RVR. No clear ppt of the tachycardia. TSH wnl. Prior CMRI from 2015 showed normal function and no significant valvular abnormalities. CHADS-VASC of 2. TTE on 5/16//17 with newly reduced LV and RV function LVEF 45-50% with mild MR and moderate TR. Rates currently controlled at 102bpm, though patient still feels her rapid HR.   - EP consult: Planning for BRE/DCCV today then initiation of sotalol 80 mg BID, which will need to be monitored with EKG for 5 doses in hospital.  - ASA 325mg  - Eliquis 5 mg BID to be started sometime after BRE   - Increased metoprolol 50mg BID  - decreased lisinopril from 30 mg to 10 mg today for BRE/DCCV with softer BP's overnight  - NPO, on heparin for BRE/DCCV  - Telemetry  - Mg>2, K>4  -update: after successful DCCV, patient hypotensive and bradycardic post-cardioversion. Received 1.5L NS and now on  vasopressin in CICU. Will plan to wean as tolerated.   -start apixaban tomorrow, keep heparin gtt until then    Chronic Issues  # Rheumatoid arthitis  - Hydroxychloroquine 200mg BID m/w/f, 100mg qD T/Th/S/S  - Leflunomide 20mg qD  - Methotrexate 10mg qWk (due for dose 5/16)  - Prednisone 3mg qD   # Neuropathy  - Gabapentin 200mg TID  # Hypertension - Lisinopril 30mg qD    #. FEN: NPO, electrolyte replacement  #. Prophylaxis: on heparin gtt    Code Status: full    Pt discussed with Dr. Angela.    Verna Caba MD, [facility]  Internal Medicine, PGY-3  Cardiology 1 Service  [phone]      ATTENDING NOTE:  Patient has been seen and evaluated by me on 05/17/2017. I have reviewed the documentation above.  I have reviewed today's vital signs, medications, labs, and imaging results.  I have reviewed and edited, as necessary, the history, review of systems, physical examination, and assessment and plan.  I have discussed my assessment and plan with the resident.  Amelia Michel is a 56 year old female with risk factor profile (+) HTN, (-) DM, (-) hypercholesterolemia, (-) tobacco use, (-) fam Hx premature CAD, presented to [facility] on 5/15/17 with palpitations, fatigue, and SOB and was found to be in new onset AF with vent rate 179.  She was initially treated with Adenosine and then with Diltiazem.  She was anticoagulated with Heparin.  She subsequently was diagnosed with A flutter.  EP consulted.  She was cardioverted today with BRE guidance.  She reverted to NSR but was hypotensive post cardioversion.  ECHO showed LVEF 45%-50%, global hypokinesis, RV dysfunction, mild MV, moderate TR.  Suspect tachycardia indicated cardiomyopathy.  Plan to start Sotalol after hemodynamics addressed.   She is now on Apixaban.     The patient developed hypotension post cardioversion require IV Phenylephrine 200 mcg, IV Vasopressin 5 U, IV Atropine 1 mg, and IV NS 1 liter.  Due to the prolonged recovery of BP and the potential need for IV  Dopa, I put the patient in the CCU.  I stayed the patient bedside, assisting in the resuscitation for 30 minutes.    Dilan Angela MD     Cardiovascular Division

## 2017-05-17 NOTE — PROCEDURES
CARDIOVERSION        PROCEDURE:  direct current cardioversion  PROCEDURE DATE: 05/17/17      Pre-procedure diagnosis:  Atrial flutter  Post-procedure diagnosis: Sinus Rhythm  Complications:  none    BRIEF CLINICAL HISTORY:  Ms. Michel is a 56 year old female who has a past medical history significant for VSD s/p repair 1969, RA, HTN, and insomnia. She presented to [facility] reporting tachycardia and palpitations found to be in AF which organized to AFL. Has been having recurrent symptom over last 3 weeks lasting up to 48 hours at a time.  A recent echo  shows LVEF 40-45%, mildly reduced RVEF, moderate biatrial enlargement, mild mitral regurgitation, and moderate tricuspid regurgitation. Prior CMRI from 2015 showed normal function and no significant valvular abnormalities. She denies any chest pain, dizziness, lightheadedness, shortness of breath, or syncopal symptoms. Telemetry shows 's. Renal function and electrolytes stable.  She is hemodynamically stable. Current cardiac medications include: Heparin, ASA and Lisinopril.     PROCEDURE:  The patient arrived at the Echo Laboratory in a fasting, non-sedated state.  Informed consent was previously obtained from patient, who understood indications, risks, and benefits of the procedure. She is currently on a heparin gtt.   Transesophageal echo was performed by the echo lab team to rule out intracardiac thrombus.  With help from Anesthesia, patient was deeply sedated.  100 J of synchronized biphasic shock was delivered through the cardiac monitor, and the patient was successfully converted to normal sinus rhythm.  After sedation wore off, patient awoke and remained neurologically intact.  The patient converted to Sinus bradycardia but remained hypotensive see fabio's note for medications administered.  She was transferred to an ICU bed due to being hemodynmically unstable.      IMPRESSION:  1.  direct current cardioversion with 100J biphasic shock to sinus  bradycardia but was hypotensive during recovery.    RECOMMENDATIONS/PLANS:  1.   Follow-up with Dr. Angelito MD and Maria Esther Cutler NP  2.   Continue heparin and then transition to NOAC or warfarin.      Scribed for ELIZABETH Martinez, CNP    ELECTROPHYSIOLOGY STAFF  I was present for, and performed/supervised, the cardioversion as documented by Jumana Brothers.  Cardioversion from flutter to sinus was performed without difficulty but she had persistent hypotension while in sinus rhythm.  She was transferred to an ICU bed on the Cardiology Service.  Richy Mcguire

## 2017-05-18 LAB
ANION GAP SERPL CALCULATED.3IONS-SCNC: 9 MMOL/L (ref 3–14)
BUN SERPL-MCNC: 19 MG/DL (ref 7–30)
CALCIUM SERPL-MCNC: 7.8 MG/DL (ref 8.5–10.1)
CHLORIDE SERPL-SCNC: 110 MMOL/L (ref 94–109)
CO2 SERPL-SCNC: 20 MMOL/L (ref 20–32)
CREAT SERPL-MCNC: 0.65 MG/DL (ref 0.52–1.04)
ERYTHROCYTE [DISTWIDTH] IN BLOOD BY AUTOMATED COUNT: 18.1 % (ref 10–15)
GFR SERPL CREATININE-BSD FRML MDRD: ABNORMAL ML/MIN/1.7M2
GLUCOSE SERPL-MCNC: 97 MG/DL (ref 70–99)
HCT VFR BLD AUTO: 28.3 % (ref 35–47)
HGB BLD-MCNC: 9.2 G/DL (ref 11.7–15.7)
INTERPRETATION ECG - MUSE: NORMAL
LMWH PPP CHRO-ACNC: 0.14 IU/ML
LMWH PPP CHRO-ACNC: 0.24 IU/ML
MAGNESIUM SERPL-MCNC: 2 MG/DL (ref 1.6–2.3)
MCH RBC QN AUTO: 35.4 PG (ref 26.5–33)
MCHC RBC AUTO-ENTMCNC: 32.5 G/DL (ref 31.5–36.5)
MCV RBC AUTO: 109 FL (ref 78–100)
PLATELET # BLD AUTO: 74 10E9/L (ref 150–450)
POTASSIUM SERPL-SCNC: 3.8 MMOL/L (ref 3.4–5.3)
RBC # BLD AUTO: 2.6 10E12/L (ref 3.8–5.2)
SODIUM SERPL-SCNC: 138 MMOL/L (ref 133–144)
TROPONIN I SERPL-MCNC: 0.04 UG/L (ref 0–0.04)
WBC # BLD AUTO: 2.6 10E9/L (ref 4–11)

## 2017-05-18 PROCEDURE — 85027 COMPLETE CBC AUTOMATED: CPT | Performed by: INTERNAL MEDICINE

## 2017-05-18 PROCEDURE — 85520 HEPARIN ASSAY: CPT | Performed by: INTERNAL MEDICINE

## 2017-05-18 PROCEDURE — 25000128 H RX IP 250 OP 636: Performed by: INTERNAL MEDICINE

## 2017-05-18 PROCEDURE — 93010 ELECTROCARDIOGRAM REPORT: CPT | Mod: 76 | Performed by: INTERNAL MEDICINE

## 2017-05-18 PROCEDURE — 21400006 ZZH R&B CCU INTERMEDIATE UMMC

## 2017-05-18 PROCEDURE — 80048 BASIC METABOLIC PNL TOTAL CA: CPT | Performed by: INTERNAL MEDICINE

## 2017-05-18 PROCEDURE — 25000132 ZZH RX MED GY IP 250 OP 250 PS 637: Performed by: INTERNAL MEDICINE

## 2017-05-18 PROCEDURE — 83735 ASSAY OF MAGNESIUM: CPT | Performed by: INTERNAL MEDICINE

## 2017-05-18 PROCEDURE — 25000125 ZZHC RX 250

## 2017-05-18 PROCEDURE — 25000125 ZZHC RX 250: Performed by: STUDENT IN AN ORGANIZED HEALTH CARE EDUCATION/TRAINING PROGRAM

## 2017-05-18 PROCEDURE — 36415 COLL VENOUS BLD VENIPUNCTURE: CPT | Performed by: INTERNAL MEDICINE

## 2017-05-18 PROCEDURE — 99232 SBSQ HOSP IP/OBS MODERATE 35: CPT | Mod: 25 | Performed by: INTERNAL MEDICINE

## 2017-05-18 PROCEDURE — 40000141 ZZH STATISTIC PERIPHERAL IV START W/O US GUIDANCE

## 2017-05-18 PROCEDURE — 25000132 ZZH RX MED GY IP 250 OP 250 PS 637: Performed by: STUDENT IN AN ORGANIZED HEALTH CARE EDUCATION/TRAINING PROGRAM

## 2017-05-18 PROCEDURE — 25000128 H RX IP 250 OP 636: Performed by: STUDENT IN AN ORGANIZED HEALTH CARE EDUCATION/TRAINING PROGRAM

## 2017-05-18 PROCEDURE — 25000128 H RX IP 250 OP 636

## 2017-05-18 PROCEDURE — 84484 ASSAY OF TROPONIN QUANT: CPT | Performed by: INTERNAL MEDICINE

## 2017-05-18 PROCEDURE — 93005 ELECTROCARDIOGRAM TRACING: CPT

## 2017-05-18 PROCEDURE — 25000125 ZZHC RX 250: Performed by: INTERNAL MEDICINE

## 2017-05-18 PROCEDURE — 99233 SBSQ HOSP IP/OBS HIGH 50: CPT | Performed by: INTERNAL MEDICINE

## 2017-05-18 PROCEDURE — 25000131 ZZH RX MED GY IP 250 OP 636 PS 637: Performed by: STUDENT IN AN ORGANIZED HEALTH CARE EDUCATION/TRAINING PROGRAM

## 2017-05-18 RX ORDER — METOPROLOL TARTRATE 1 MG/ML
10 INJECTION, SOLUTION INTRAVENOUS ONCE
Status: COMPLETED | OUTPATIENT
Start: 2017-05-18 | End: 2017-05-18

## 2017-05-18 RX ORDER — ADENOSINE 3 MG/ML
6 INJECTION, SOLUTION INTRAVENOUS ONCE
Status: COMPLETED | OUTPATIENT
Start: 2017-05-18 | End: 2017-05-18

## 2017-05-18 RX ORDER — POTASSIUM CHLORIDE 750 MG/1
20 TABLET, EXTENDED RELEASE ORAL ONCE
Status: COMPLETED | OUTPATIENT
Start: 2017-05-18 | End: 2017-05-18

## 2017-05-18 RX ORDER — SODIUM CHLORIDE, SODIUM LACTATE, POTASSIUM CHLORIDE, CALCIUM CHLORIDE 600; 310; 30; 20 MG/100ML; MG/100ML; MG/100ML; MG/100ML
INJECTION, SOLUTION INTRAVENOUS
Status: COMPLETED
Start: 2017-05-18 | End: 2017-05-18

## 2017-05-18 RX ORDER — METOPROLOL TARTRATE 1 MG/ML
INJECTION, SOLUTION INTRAVENOUS
Status: COMPLETED
Start: 2017-05-18 | End: 2017-05-18

## 2017-05-18 RX ORDER — SOTALOL HYDROCHLORIDE 80 MG/1
80 TABLET ORAL EVERY 12 HOURS SCHEDULED
Status: DISCONTINUED | OUTPATIENT
Start: 2017-05-18 | End: 2017-05-19

## 2017-05-18 RX ORDER — ONDANSETRON 2 MG/ML
4 INJECTION INTRAMUSCULAR; INTRAVENOUS EVERY 6 HOURS PRN
Status: DISCONTINUED | OUTPATIENT
Start: 2017-05-18 | End: 2017-05-23 | Stop reason: HOSPADM

## 2017-05-18 RX ORDER — DILTIAZEM HYDROCHLORIDE 5 MG/ML
5 INJECTION INTRAVENOUS ONCE
Status: COMPLETED | OUTPATIENT
Start: 2017-05-18 | End: 2017-05-18

## 2017-05-18 RX ORDER — METOPROLOL TARTRATE 1 MG/ML
5 INJECTION, SOLUTION INTRAVENOUS ONCE
Status: COMPLETED | OUTPATIENT
Start: 2017-05-18 | End: 2017-05-18

## 2017-05-18 RX ORDER — DABIGATRAN ETEXILATE 150 MG/1
150 CAPSULE ORAL 2 TIMES DAILY
Status: DISCONTINUED | OUTPATIENT
Start: 2017-05-18 | End: 2017-05-18

## 2017-05-18 RX ADMIN — SODIUM CHLORIDE, POTASSIUM CHLORIDE, SODIUM LACTATE AND CALCIUM CHLORIDE 500 ML: 600; 310; 30; 20 INJECTION, SOLUTION INTRAVENOUS at 07:51

## 2017-05-18 RX ADMIN — ADENOSINE 6 MG: 3 INJECTION, SOLUTION INTRAVENOUS at 09:08

## 2017-05-18 RX ADMIN — SOTALOL HYDROCHLORIDE 80 MG: 80 TABLET ORAL at 21:53

## 2017-05-18 RX ADMIN — DILTIAZEM HYDROCHLORIDE 5 MG: 5 INJECTION INTRAVENOUS at 09:23

## 2017-05-18 RX ADMIN — METOPROLOL TARTRATE 5 MG: 1 INJECTION, SOLUTION INTRAVENOUS at 07:48

## 2017-05-18 RX ADMIN — METOROPROLOL TARTRATE 5 MG: 5 INJECTION, SOLUTION INTRAVENOUS at 07:48

## 2017-05-18 RX ADMIN — HYDROXYCHLOROQUINE SULFATE 200 MG: 200 TABLET, FILM COATED ENTERAL at 07:40

## 2017-05-18 RX ADMIN — Medication 2.5 ML: at 07:51

## 2017-05-18 RX ADMIN — HEPARIN SODIUM 1000 UNITS/HR: 10000 INJECTION, SOLUTION INTRAVENOUS at 00:55

## 2017-05-18 RX ADMIN — LEFLUNOMIDE 20 MG: 20 TABLET ORAL at 07:40

## 2017-05-18 RX ADMIN — GABAPENTIN 200 MG: 100 CAPSULE ORAL at 21:17

## 2017-05-18 RX ADMIN — SOTALOL HYDROCHLORIDE 80 MG: 80 TABLET ORAL at 10:55

## 2017-05-18 RX ADMIN — GABAPENTIN 200 MG: 100 CAPSULE ORAL at 07:40

## 2017-05-18 RX ADMIN — POTASSIUM CHLORIDE 20 MEQ: 750 TABLET, EXTENDED RELEASE ORAL at 21:34

## 2017-05-18 RX ADMIN — APIXABAN 5 MG: 5 TABLET, FILM COATED ORAL at 10:55

## 2017-05-18 RX ADMIN — Medication 3 MG: at 07:40

## 2017-05-18 RX ADMIN — ONDANSETRON 4 MG: 2 INJECTION INTRAMUSCULAR; INTRAVENOUS at 16:25

## 2017-05-18 RX ADMIN — GABAPENTIN 200 MG: 100 CAPSULE ORAL at 13:58

## 2017-05-18 RX ADMIN — ASPIRIN 325 MG ORAL TABLET 325 MG: 325 PILL ORAL at 07:40

## 2017-05-18 RX ADMIN — APIXABAN 5 MG: 5 TABLET, FILM COATED ORAL at 21:17

## 2017-05-18 RX ADMIN — METOROPROLOL TARTRATE 10 MG: 5 INJECTION, SOLUTION INTRAVENOUS at 08:32

## 2017-05-18 NOTE — PROGRESS NOTES
Cardiology Progress Note    S:  - DCCV yesterday, c/b hypotension requiering vaso-active meds  - BP improved later in the day, patient transfer to the floor in the evening  - Converted to SVT this am did not improve with metoprolol iv 5mg x1, or 10mg x1  - Adenosine given with short decrease HR  - given 10mg of iv diltiazem with improvement in hr but c/b transient hypotension    O:  AF, BP: /70, HR77>170>140, Sating 96 on RA    I/O:   Intake/Output Summary (Last 24 hours) at 05/18/17 1159  Last data filed at 05/18/17 0730   Gross per 24 hour   Intake          1335.31 ml   Output              300 ml   Net          1035.31 ml       Gen: NAD  Pulm: CTAB  CV: intial RRR, no mgr, but then tachycardic  Ex: wwp no edema     Labs Reviewed.   Trop negative   Cr 0.65    Leukopenia 2.6  hgb 9.2  plt 74    A/P:  Amelia Michel is a 55yo F w/ RA, VSD s/p repair 1969, hypertension, and insomnia that presents with multiple atrial arrhythmias with RVR. ( SVT, Afib, A-flutter).       # New Afib-->AFL  CHADS-VASC of 2.   - S/p failed DCCV (c/b hypotension post dccv)  - RVR this am did not respond to metoprolol  - RVR responsive to diltiazem but became hypotensive  - Started on sotolol per EP recs  - d/c hep gtt  - start eliquis 5mg bid   - Appreciate ep input   - Telemetry  - Mg>2, K>4       Chronic Issues  # Rheumatoid arthitis  - Hydroxychloroquine 200mg BID m/w/f, 100mg qD T/Th/S/S  - Leflunomide 20mg qD  - Methotrexate 10mg qWk (due for dose 5/16)  - Prednisone 3mg qD   # Neuropathy  - Gabapentin 200mg TID  # Hypertension - Lisinopril 30mg qD      Rick Nguyễn MD   Cardiology Fellow   [phone]            I have seen and examined the patient and agree with the finding and plan.       Yaya Slaughter MD  Cardiology-CSI  [phone]

## 2017-05-18 NOTE — PLAN OF CARE
Problem: Goal Outcome Summary  Goal: Goal Outcome Summary  Outcome: Improving  Transferred tp 6C via wheelchair.  VSS.  Rare PVC's and PAC's. K 4.2, Mg 2.1.  Voided x1 for 100cc.  Ate 100% of dinner.  Heparin 10A 0.51   Heparin off for 30 mins and restarted @ 1000 u/hr.  Heparin 10 A due 0015 5/18/17.  Belongings sent with patient.  Ambulated earlier around 4E and 4C without issues.  Gave additional 250 cc NS per Dr. Angela.

## 2017-05-18 NOTE — PLAN OF CARE
Problem: Goal Outcome Summary  Goal: Goal Outcome Summary  Outcome: No Change  D:A fib with RVR. Cardioversion 5/17.      I:Heparin gtt at 1000units/hr, at 0050 bolus given and gtt increased to 1150units/hr. Lymph wraps on overnight per patient home routine.      A:A/Ox4. Denies pain. Room air. Lungs CTA. Active bowel sounds. Adequate urine output. Up with SBA. PIVx2. Right heel wound dressing C/D/I.   Temp:  [97.3  F (36.3  C)-98.2  F (36.8  C)] 98  F (36.7  C)  Pulse:  [] 73  Heart Rate:  [] 77  Resp:  [14-16] 16  BP: ()/(34-75) 108/58  SpO2:  [96 %-100 %] 96 %     P: Continue to monitor and assess with reports of concerns to Cards 1 team.      Scarlet Hinojosa RN 05/18/17 6:44 AM     Hours of Care [phone]

## 2017-05-18 NOTE — PROGRESS NOTES
Electrophysiology Consult Service  Follow up Note   EP Attending:    Date of Service: 5/18/2017     S: We continue to follow this patient for AF/AFL management. She underwent BRE/DCCV yesterday (5/17/17) which was complicated by hypotension. She went into SVT this morning (5/18/17) HRs 170's. She was given IV diltiazem bolus and had softer BPs following. She then went into into AF/-150's. She had not been started on Sotalol yesterday. She continues on heparin gtt. She reports palpitations, rapid HR, fatigue, and some nausea. She is hemodynamically stable.   HPI:  Ms. Michel is a 56 year old female who has a past medical history significant for VSD s/p repair 1969, RA, HTN, and insomnia. She presented to [facility] reporting tachycardia and palpitations. Presenting 12 lead ECG shows AF. She was given IV adenosine and diltiazem in the ER. She organized to an AFL and continues to be in AFL. She reports se first developed symptoms of palpitations and tachycardia about 3 weeks ago. She noticed her HRs up to 150 bpm and this lasted for about 48 hours and then her HRs returned back to baseline. She then had another 36 hour episode. Then most recently on 5/12/17, she developed the same symptoms again but this persisted and she developed fatigue and nausea. This prompted her to come in for evaluation. An echo today shows LVEF 40-45%, mildly reduced RVEF, moderate biatrial enlargement, mild mitral regurgitation, and moderate tricuspid regurgitation. Prior CMRI from 2015 showed normal function and no significant valvular abnormalities. She denies any chest pain, dizziness, lightheadedness, shortness of breath, or syncopal symptoms. Telemetry shows 's. Renal function and electrolytes stable. She continues to endorse palpitations. She is hemodynamically stable. Current cardiac medications include: ASA and Lisinopril.   O:   Vitals: /84  Pulse 73  Temp 97.9  F (36.6  C) (Oral)  Resp 18  Ht 1.473 m  "(4' 10\")  Wt 64.2 kg (141 lb 8 oz)  SpO2 99%  BMI 29.57 kg/m2  GENERAL APPEARANCE: AxO, NAD   HEENT: NCAT, EOMI, MMM. PERRLA.   NECK: Supple.   CHEST: CTAB   CARDIOVASCULAR: Regularly irregular, soft murmur.   ABDOMEN: NT, ND, BS+, soft   EXTREMITIES: No pedal edema. Distal pulses intact.   NEURO: Grossly nonfocal.   PSYCH: Normal affect.  SKIN: Warm and dry.    Data:  Labs:  BMP  Recent Labs  Lab 05/18/17  0818 05/17/17  1555 05/17/17  0754 05/16/17  0740 05/15/17  1752     --  142 139 136   POTASSIUM 3.8 4.2 4.0 4.0 4.5   CHLORIDE 110*  --  111* 108 104   VIKTORIYA 7.8*  --  8.3* 8.2* 9.2   CO2 20  --  23 25 24   BUN 19  --  17 14 19   CR 0.65  --  0.70 0.82 0.87   GLC 97  --  96 82 103*     CBC  Recent Labs  Lab 05/18/17  0818 05/17/17  0754 05/16/17  1029 05/15/17  1752   WBC 2.6* 3.4* 4.7 4.2   RBC 2.60* 2.85* 2.70* 3.04*   HGB 9.2* 10.0* 9.6* 10.9*   HCT 28.3* 30.8* 29.0* 32.7*   * 108* 107* 108*   MCH 35.4* 35.1* 35.6* 35.9*   MCHC 32.5 32.5 33.1 33.3   RDW 18.1* 18.0* 18.0* 17.6*   PLT 74* 86* 88* 100*     INR  Recent Labs  Lab 05/15/17  1800   INR 1.0     EKG:       Meds per [facility] EMR:  Current Facility-Administered Medications   Medication     diltiazem (CARDIZEM) 125 mg in NaCl 0.9 % 125 mL infusion     hydroxychloroquine (PLAQUENIL) tablet 200 mg     vasopressin (PITRESSIN) 40 Units in D5W 40 mL infusion     diphenoxylate-atropine (LOMOTIL) liquid 2.5 mL     aspirin tablet 325 mg     gabapentin (NEURONTIN) capsule 200 mg     leflunomide (ARAVA) tablet 20 mg     predniSONE (DELTASONE) half-tab 3 mg     lidocaine 1 % 1 mL     lidocaine (LMX4) kit     sodium chloride (PF) 0.9% PF flush 3 mL     sodium chloride (PF) 0.9% PF flush 3 mL     medication instruction     nitroglycerin (NITROSTAT) sublingual tablet 0.4 mg     alum & mag hydroxide-simethicone (MYLANTA ES/MAALOX  ES) suspension 15-30 mL     acetaminophen (TYLENOL) tablet 650 mg     acetaminophen (TYLENOL) Suppository 650 mg     methotrexate " tablet CHEMO 10 mg     hydroxychloroquine (PLAQUENIL) tablet 200 mg     heparin  drip 25,000 units in 0.45% NaCl 250 mL (see additional administration details for dose)     heparin bolus from infusion pump     5/15/17 Echo:  Interpretation Summary  Normal left ventricular size and thickness with mildly reduced systolic  function. EF 45-50% (traced at 48%).  S/P VSD repair. Ventricular septum appears intact by color Doppler.  Normal right ventricular size with mildly reduced function.  Moderate biatrial enlargement is present.  Mild mitral regurgitation and moderate tricuspid regurgitation. The mechanism  of tricuspid regurgitation appears to be tricuspid valve prolapse.  No pericardial effusion.     Compared to the prior study 2015 the LV and RV function are now reduced  and both the MR and most notably, the TR are new.    A:   Ms. Michel is a 56 year old female who has a past medical history significant for VSD s/p repair , RA, HTN, and insomnia. She presented to [facility] reporting tachycardia and palpitations found to be in AF which organized to AFL. Has been having recurrent symptom over last 3 weeks lasting up to 48 hours at a time. An echo showed LVEF 40-45%, mildly reduced RVEF, moderate biatrial enlargement, mild mitral regurgitation, and moderate tricuspid regurgitation. Prior CMRI from  showed normal function and no significant valvular abnormalities. She underwent BRE/DCCV yesterday (17) which was complicated by hypotension. She went into SVT this morning (17) HRs 170's. She was given IV diltiazem bolus and had softer BPs following. She then went into into AF/-150's. She had not been started on Sotalol yesterday. She continues on heparin gtt. She reports palpitations, rapid HR, fatigue, and some nausea. She is hemodynamically stable.     EP recommendations:   We discussed in detail with the patient management/treatment options for AF/AFL includin. Stroke Prophylaxis:  CHADSVASC=+HTN, +gender 2, corresponding to a 2.2% annual stroke / systemic [facility] event rate. indicating need for long term oral anticoagulation. Recommend transitioning to Eliquis 5 mg BID and can turn heparin gtt off.   2. Rate Control: can consider addition of BB as needed.   3. Rhythm Control: Given frequency and duration of symptoms and now recurrent AF, we recommend starting an AAT and discussed Sotalol yesterday. We would recommend starting today at 80 mg BID. She would then need to be inpatient for 5 monitor doses. QTc interval should be measured 2-3 hours after each dose for 5 monitored doses. If the QTc is >15% of baseline, or if the QTc is >500 msec (550 msec in patients with ventricular conduction abnormalities),Sotalol should be reduced by half or discontinued- call EP in this case. If no chemical cardioversion after 48 hours, we can pursue DCCV.   4. Risk Factor Management:maintain tight BP control and PB evaluation as an outpatients.      At discharge we will have her follow up in 2 months with a repeat echo.   The patient states understanding and is agreeable with plan.   Thank you much for allowing us to participate in the care of this pleasant patient.   This patient was discussed with  and the note above reflects our joint assessment and plan.   ELIZABETH Verde CNP  Electrophysiology Consult Service  Pager: [phone]      I have seen and examined the patient today as part of a shared visit with Maria Esther Dao (CNP). I reviewed today's interval history and labs. On exam today I found her to be in atrial fibrillation with rapid ventricular rates. Decision(s) made by me today include initiation of sotalol therapy and discussion of possible supraventricular tachycardia ablation. These have been discussed with team to complete.     Shashi Dickson MD

## 2017-05-18 NOTE — PLAN OF CARE
"Problem: Goal Outcome Summary  Goal: Goal Outcome Summary  Outcome: No Change  Pt converted to SVT at 0800; HR 180s. Pt asymptomatic with BPs in the 100s/70s. MD notified and 5mg x1 then 10mg x1 of metoprolol administered without change. With MD at bedside, 6 of adenosine administered with slight decrease in HR 150s. 5mg IV diltiazem given; BP dropped to 70s/50s. Pt still asymptomatic. EP consulted and sotalol initiated; pt converted to NSR at 1200 with HR in the 60s. BPs remain soft at 90s/60s. Heparin gtt dc'd and Eloquis given. Pt reported dry heaving at 1430; did not want medication. Ginger ale given with some relief. Pt reporting that she is feelings \"blah\" and tired. No complaints of chest pain or discomfort. Up with SBA to commode; voiding well. Day and night compression stockings at bedside. Will continue on sotalol for 5 doses before discharging. Continue to monitor and notify team with changes.           "

## 2017-05-18 NOTE — PLAN OF CARE
Problem: Goal Outcome Summary  Goal: Goal Outcome Summary  Outcome: Improving  D/I: Pt accepted from 4E s/p cardioversion for Afib with RVR. Presently SR 70s with 0-3 PVCs and 0-7 PACs. Other VSS. Moves independently. Has an wound on her R heel that is open from rubbing on a brace (she has neuropathy in that leg) Cleaned with wound cleanser, bacitracin and primapore applied. Takes lomotil BID, waiting for dose to come up from pharmacy. On a heparin drip at 1000 units/hr with xa level at MN.  A: Stable.  P: Monitor and assist as needed.

## 2017-05-18 NOTE — PROVIDER NOTIFICATION
Pt went into a. flutter with HR in the 180s. Asymptomatic. BS stable at 110/70s. MD notified. 5 of metoprolol and LR bolus started. HR currently in 150s with /80 MAP 92

## 2017-05-18 NOTE — PROGRESS NOTES
Care Coordinator Progress Note     Admission Date/Time:  5/15/2017  Attending MD:  Dilan Angela MD  Data  Chart reviewed, discussed with interdisciplinary team.   Patient was admitted for:    Tachycardia  SVT (supraventricular tachycardia) (H).    Concerns with insurance coverage for discharge needs: None.  Current Living Situation: Patient lives with spouse. Pt states she is independent with her cares.  Support System: Supportive and Involved spouse  Services Involved: None currently  Transportation: Family or Friend will provide  Barriers to Discharge: Monitoring Sotalol initiation    Coordination of Care and Referrals: No home care needs identified     Plan  Anticipated Discharge Date: TBD  Anticipated Discharge Plan: Discharge to home  CC will continue to monitor patient's medical condition and progress towards discharge.  Rose Mayes RN BSN  6C Unit Care Coordinator  Phone number: [phone]  Pager: [phone]

## 2017-05-19 LAB
ANION GAP SERPL CALCULATED.3IONS-SCNC: 7 MMOL/L (ref 3–14)
BUN SERPL-MCNC: 15 MG/DL (ref 7–30)
CALCIUM SERPL-MCNC: 8 MG/DL (ref 8.5–10.1)
CHLORIDE SERPL-SCNC: 108 MMOL/L (ref 94–109)
CO2 SERPL-SCNC: 20 MMOL/L (ref 20–32)
CREAT SERPL-MCNC: 0.74 MG/DL (ref 0.52–1.04)
ERYTHROCYTE [DISTWIDTH] IN BLOOD BY AUTOMATED COUNT: 17.8 % (ref 10–15)
GFR SERPL CREATININE-BSD FRML MDRD: 81 ML/MIN/1.7M2
GLUCOSE SERPL-MCNC: 82 MG/DL (ref 70–99)
HCT VFR BLD AUTO: 28.3 % (ref 35–47)
HGB BLD-MCNC: 9.6 G/DL (ref 11.7–15.7)
INTERPRETATION ECG - MUSE: NORMAL
LACTATE BLD-SCNC: 1.2 MMOL/L (ref 0.7–2.1)
MAGNESIUM SERPL-MCNC: 1.9 MG/DL (ref 1.6–2.3)
MCH RBC QN AUTO: 36.1 PG (ref 26.5–33)
MCHC RBC AUTO-ENTMCNC: 33.9 G/DL (ref 31.5–36.5)
MCV RBC AUTO: 106 FL (ref 78–100)
PLATELET # BLD AUTO: 84 10E9/L (ref 150–450)
POTASSIUM SERPL-SCNC: 4.1 MMOL/L (ref 3.4–5.3)
RBC # BLD AUTO: 2.66 10E12/L (ref 3.8–5.2)
SODIUM SERPL-SCNC: 135 MMOL/L (ref 133–144)
WBC # BLD AUTO: 3.6 10E9/L (ref 4–11)

## 2017-05-19 PROCEDURE — 25000125 ZZHC RX 250: Performed by: STUDENT IN AN ORGANIZED HEALTH CARE EDUCATION/TRAINING PROGRAM

## 2017-05-19 PROCEDURE — 25000132 ZZH RX MED GY IP 250 OP 250 PS 637: Performed by: STUDENT IN AN ORGANIZED HEALTH CARE EDUCATION/TRAINING PROGRAM

## 2017-05-19 PROCEDURE — 83605 ASSAY OF LACTIC ACID: CPT | Performed by: INTERNAL MEDICINE

## 2017-05-19 PROCEDURE — 93010 ELECTROCARDIOGRAM REPORT: CPT | Performed by: INTERNAL MEDICINE

## 2017-05-19 PROCEDURE — 80048 BASIC METABOLIC PNL TOTAL CA: CPT | Performed by: INTERNAL MEDICINE

## 2017-05-19 PROCEDURE — 83735 ASSAY OF MAGNESIUM: CPT | Performed by: INTERNAL MEDICINE

## 2017-05-19 PROCEDURE — 25000131 ZZH RX MED GY IP 250 OP 636 PS 637: Performed by: STUDENT IN AN ORGANIZED HEALTH CARE EDUCATION/TRAINING PROGRAM

## 2017-05-19 PROCEDURE — 36415 COLL VENOUS BLD VENIPUNCTURE: CPT | Performed by: INTERNAL MEDICINE

## 2017-05-19 PROCEDURE — 93005 ELECTROCARDIOGRAM TRACING: CPT

## 2017-05-19 PROCEDURE — 85027 COMPLETE CBC AUTOMATED: CPT | Performed by: INTERNAL MEDICINE

## 2017-05-19 PROCEDURE — 21400006 ZZH R&B CCU INTERMEDIATE UMMC

## 2017-05-19 PROCEDURE — 25000128 H RX IP 250 OP 636: Performed by: STUDENT IN AN ORGANIZED HEALTH CARE EDUCATION/TRAINING PROGRAM

## 2017-05-19 PROCEDURE — 99232 SBSQ HOSP IP/OBS MODERATE 35: CPT | Performed by: INTERNAL MEDICINE

## 2017-05-19 PROCEDURE — 25000132 ZZH RX MED GY IP 250 OP 250 PS 637: Performed by: INTERNAL MEDICINE

## 2017-05-19 RX ORDER — METOPROLOL SUCCINATE 50 MG/1
50 TABLET, EXTENDED RELEASE ORAL DAILY
Status: DISCONTINUED | OUTPATIENT
Start: 2017-05-19 | End: 2017-05-19

## 2017-05-19 RX ORDER — DILTIAZEM HYDROCHLORIDE 120 MG/1
120 CAPSULE, COATED, EXTENDED RELEASE ORAL DAILY
Qty: 30 CAPSULE | Refills: 1 | Status: ON HOLD | OUTPATIENT
Start: 2017-05-19 | End: 2017-06-16

## 2017-05-19 RX ORDER — METOPROLOL SUCCINATE 100 MG/1
100 TABLET, EXTENDED RELEASE ORAL DAILY
Status: DISCONTINUED | OUTPATIENT
Start: 2017-05-20 | End: 2017-05-23 | Stop reason: HOSPADM

## 2017-05-19 RX ORDER — METOPROLOL TARTRATE 50 MG
50 TABLET ORAL ONCE
Status: COMPLETED | OUTPATIENT
Start: 2017-05-19 | End: 2017-05-19

## 2017-05-19 RX ORDER — DILTIAZEM HYDROCHLORIDE 120 MG/1
120 CAPSULE, COATED, EXTENDED RELEASE ORAL DAILY
Status: DISCONTINUED | OUTPATIENT
Start: 2017-05-19 | End: 2017-05-23 | Stop reason: HOSPADM

## 2017-05-19 RX ORDER — METOPROLOL SUCCINATE 50 MG/1
50 TABLET, EXTENDED RELEASE ORAL DAILY
Qty: 30 TABLET | Refills: 1 | Status: SHIPPED | OUTPATIENT
Start: 2017-05-19 | End: 2017-05-23

## 2017-05-19 RX ADMIN — MAGNESIUM SULFATE IN DEXTROSE 1 G: 10 INJECTION, SOLUTION INTRAVENOUS at 09:01

## 2017-05-19 RX ADMIN — HYDROXYCHLOROQUINE SULFATE 200 MG: 200 TABLET, FILM COATED ENTERAL at 09:04

## 2017-05-19 RX ADMIN — LEFLUNOMIDE 20 MG: 20 TABLET ORAL at 09:04

## 2017-05-19 RX ADMIN — Medication 2.5 ML: at 20:29

## 2017-05-19 RX ADMIN — METOPROLOL TARTRATE 50 MG: 50 TABLET ORAL at 19:04

## 2017-05-19 RX ADMIN — Medication 3 MG: at 09:38

## 2017-05-19 RX ADMIN — APIXABAN 5 MG: 5 TABLET, FILM COATED ORAL at 09:04

## 2017-05-19 RX ADMIN — GABAPENTIN 200 MG: 100 CAPSULE ORAL at 20:29

## 2017-05-19 RX ADMIN — METOPROLOL SUCCINATE 50 MG: 50 TABLET, FILM COATED, EXTENDED RELEASE ORAL at 11:45

## 2017-05-19 RX ADMIN — APIXABAN 5 MG: 5 TABLET, FILM COATED ORAL at 20:29

## 2017-05-19 RX ADMIN — GABAPENTIN 200 MG: 100 CAPSULE ORAL at 15:05

## 2017-05-19 RX ADMIN — HYDROXYCHLOROQUINE SULFATE 200 MG: 200 TABLET, FILM COATED ENTERAL at 20:29

## 2017-05-19 RX ADMIN — DILTIAZEM HYDROCHLORIDE 120 MG: 120 CAPSULE, EXTENDED RELEASE ORAL at 11:45

## 2017-05-19 RX ADMIN — Medication 2.5 ML: at 09:03

## 2017-05-19 RX ADMIN — GABAPENTIN 200 MG: 100 CAPSULE ORAL at 09:03

## 2017-05-19 NOTE — PLAN OF CARE
Problem: Goal Outcome Summary  Goal: Goal Outcome Summary  Outcome: No Change  D/I: Pt s/p cardioversion for Afib has been in SB/SR 50s-60s with 9 beats of VT this evening. MD called, 12 lead EKG done, and evening dose of sotalol given.  Pt given printed info about sotalol as well. Pt has been nauseous with very poor appetite, laying low this shift.  Zofran given with some relief. Able to tolerate some crackers and gingerale this evening. C/o some SOB with exert. Sats high 90s on RA.   A: Stable on sotalol.  P: Monitor and assist as needed.

## 2017-05-19 NOTE — PLAN OF CARE
"Problem: Goal Outcome Summary  Goal: Goal Outcome Summary  Outcome: No Change  Four runs of SVT since midnight ranging from 10-60 seconds; rate 170-180. Pt symptomatic when awake, asymptomatic if it occurred while she was asleep. AM labs drawn early, 1g Mag ordered for mag+ 1.9. Pt reports extensive MONTALVO when up to bathroom. \"Very debilitating, don't know how I could live at home like this.\" Continue to assess pt and notify Cards I with questions and concerns.      Juan Lucas RN 05/19/17 6:02 AM     Hours of cares [phone]             "

## 2017-05-19 NOTE — PROGRESS NOTES
Pt had 3 minutes of SVT 22:39-22:42 before going back to SB. MDs notified. 12 lead EKG ordered for MN 2 hours after last sotalol dose.

## 2017-05-19 NOTE — PROGRESS NOTES
"Cardiology Progress Note    S/ interval events:  - DCCV 5/17, c/b hypotension requiering vaso-active meds  - BP improved later in the day, patient transfer to the floor in the evening  - Converted to SVT 5/18 and did not improve with metoprolol iv 5mg x1, or 10mg x1  - Adenosine given with short decrease HR  - given 10mg of iv diltiazem with improvement in hr but c/b transient hypotension  - 5/19: not tolerating sotalol: nausea, dyspnea and patient wanting to switch medications    O:  Blood pressure 103/63, pulse 73, temperature 98.1  F (36.7  C), temperature source Oral, resp. rate 20, height 1.473 m (4' 10\"), weight 64.8 kg (142 lb 14.4 oz), SpO2 96 %, not currently breastfeeding.    Intake/Output Summary (Last 24 hours) at 05/19/17 0906  Last data filed at 05/19/17 0000   Gross per 24 hour   Intake              240 ml   Output              600 ml   Net             -360 ml     Gen: NAD  Pulm: CTAB  CV: intial RRR, no mgr, but then tachycardic  Ex: wwp no edema     Labs Reviewed.   Cr 0.74  Leukopenia 3.6  hgb 9.6  plt 84    A/P:  Amelia Michel is a 55yo F w/ RA, VSD s/p repair 1969, hypertension, and insomnia that presents with multiple atrial arrhythmias with RVR. ( SVT, Afib, A-flutter).       # New Afib-->AFL  CHADS-VASC of 2 (sex, htn).   - S/p failed DCCV (c/b hypotension post dccv)  - RVR on 5/18 did not respond to metoprolol  - RVR responsive to diltiazem but became hypotensive  - Started on sotolol per EP recs but patient not tolerating: nausea  - today initiated rate control with diltiazem and metoprolol  - eliquis 5mg bid   - Appreciate ep input   - Telemetry  - Mg>2, K>4  - holding lisinopril  - follow up with EP in 3-4 weeks  - follow up with PCP within 1 week regarding change in BP meds: monitor.     Chronic Issues  # Rheumatoid arthitis  - Hydroxychloroquine 200mg BID m/w/f, 100mg qD T/Th/S/S  - Leflunomide 20mg qD  - Methotrexate 10mg qWk (due for dose 5/16)  - Prednisone 3mg qD   # Neuropathy  - " Gabapentin 200mg TID  # Hypertension - holding lisinopril 30mg qD and will discontinue on discharge in context of newly initiated metoprolol and diltiazem.    Staffed with Dr. Trent.    Verna Caba MD  IM PGY3  [phone]    I very much appreciated the opportunity to see and assess Mrs Amelia Michel in the hospital with  CV resident Dr Caba. Patient's spouse was present and I addressed his several questions as best I could. We discussed plan for Rate-control meds and future possible therapies including RFA as appropriate. We will arrange for her to see Dr Sandy Michele in EP clinic for AFib follow-up care.    I agree with the note above which summarizes my findings and current recommendations.  Please do not hesitate to contact my office if you have any questions or concerns.      Pj Trent MD  Cardiac Arrhythmia Service  [facility]  [phone]

## 2017-05-19 NOTE — DISCHARGE SUMMARY
"                                                                 Cardiology Progress  Amelia Michel MRN: [id number]  1960  Primary care provider: Comfort Sabillon  ___________________________________          Date of Admission:  5/15/2017  Date of Service 5/19/17  Admitting Physician:  Yaya Slaughter MD  Attending Physician:  Pj Trent MD   Discharging Service:  Cards 1      Primary Provider: Comfort Sabillon         Reason for Admission:   Tachycardia, palpitations    \"Ms. Michel is a 56 year old female who has a past medical history significant for VSD s/p repair 1969, RA, HTN, and insomnia. She presented to [facility] reporting tachycardia and palpitations. Presenting 12 lead ECG shows AF. She was given IV adenosine and diltiazem in the ER. She organized to an AFL and continues to be in AFL. She reports se first developed symptoms of palpitations and tachycardia about 3 weeks ago. She noticed her HRs up to 150 bpm and this lasted for about 48 hours and then her HRs returned back to baseline. She then had another 36 hour episode. Then most recently on 5/12/17, she developed the same symptoms again but this persisted and she developed fatigue and nausea. This prompted her to come in for evaluation. An echo today shows LVEF 40-45%, mildly reduced RVEF, moderate biatrial enlargement, mild mitral regurgitation, and moderate tricuspid regurgitation. Prior CMRI from 2015 showed normal function and no significant valvular abnormalities. She denies any chest pain, dizziness, lightheadedness, shortness of breath, or syncopal symptoms. Telemetry shows 's. Renal function and electrolytes stable. She continues to endorse palpitations.\"          Discharge Diagnosis:   Atrial fibrillation/Atrial flutter/pSVT         Procedures & Significant Findings:   BRE   DCCV    TTE 5/17/17  Interpretation Summary  Normal left ventricular size and thickness with mildly reduced systolic  function. EF 45-50%.  Moderate " "tricuspid regurgitation. The mechanism is prolapse of the anterior  tricuspid leaflet.  The left atrial appendage is normal. It is free of spontaneous echo contrast  and thrombus.  Compared to the prior study 5/16/2017 there has been no change.         Consultations:   EP         Hospital Course by Problem:    Amelia Micehl is a 56 year old female with a hx of rheumatoid arthritis, VSD s/p repair 1969, hypertension, and insomnia that presents with tachycardia: Afib/Aflutter/pSVT.    # New Afib-->AFL/pSVT  CHADS-VASC of 2.   Patient failed DCCV after 24 hrs and it was complicated by hypotension requiring vasoactive agents. Reverted to Afib/flutter with RVR that did not respond to IV metoprolol and became hypotensive with IV diltiazem. She was started on sotalol and received 2 doses 80 mg BID. QTc not prolonged. She felt that it was causing her to feel nauseated and dyspneic. (additionally, patient is on hydroxychloroquine which is a QTc prolonging agent). Consequently, controlling her rates with diltiazem 24 hr 120 mg qday and metoprolol XL 50 mg. Tolerating well. If she needs further control, consider flecainide or propafenone (to add to AV prieto combination as outpatient.) AC with apixaban for at least 3 mos after DCCV. In NSR at discharge.   -Discharged on diltiazem SA 120mg qday and metoprolol XL 50 mg   -discontinued lisinopril due to hypotension during hospitalization  -Discharged on apixaban 5mg BID  -Follow up with EP in clinic with Dr. Michele in 3-4 weeks  -Follow up with PCP     Physical Exam on day of Discharge:  Blood pressure 92/67, pulse 70, temperature 98.3  F (36.8  C), temperature source Oral, resp. rate 16, height 1.473 m (4' 10\"), weight 64.8 kg (142 lb 14.4 oz), SpO2 100 %, not currently breastfeeding.    Gen: NAD  Pulm: CTAB  CV: RRR, no mgr  Ex: wwp no edema          Pending Results:   none         Discharge Medications:     Current Discharge Medication List      START taking these medications "    Details   apixaban ANTICOAGULANT (ELIQUIS) 5 MG tablet Take 1 tablet (5 mg) by mouth 2 times daily  Qty: 60 tablet, Refills: 3    Comments: Take for 3 mos then re-evaluate with cardiologist  Associated Diagnoses: Atrial fibrillation status post cardioversion (H)      metoprolol (TOPROL-XL) 50 MG 24 hr tablet Take 1 tablet (50 mg) by mouth daily  Qty: 30 tablet, Refills: 1    Associated Diagnoses: Atrial tachycardia (H)      diltiazem 120 MG 24 hr capsule Take 1 capsule (120 mg) by mouth daily  Qty: 30 capsule, Refills: 1    Associated Diagnoses: Atrial tachycardia (H)         CONTINUE these medications which have NOT CHANGED    Details   Calcium Carb-Cholecalciferol 600-800 MG-UNIT TABS Take 2 tablets by mouth every morning      Multiple Vitamins-Minerals (WOMENS MULTIVITAMIN PO) Take 1 tablet by mouth daily At bedtime      Coenzyme Q10 (COQ-10 PO) Take 1 tablet by mouth daily In the morning      gabapentin (NEURONTIN) 100 MG capsule Take 2 capsules (200 mg) by mouth 3 times daily  Qty: 180 capsule, Refills: 3      traMADol (ULTRAM) 50 MG tablet Take 50 mg by mouth Every 6 hours as needed for pain      diphenoxylate-atropine (LOMOTIL) 2.5-0.025 MG per tablet Take 0.5 tablets by mouth 2 times daily       ASPIRIN PO Take 325 mg by mouth daily At bedtime      hydroxychloroquine (PLAQUENIL) 200 MG tablet Take 1 tablet by mouth twice daily on Monday, Wednesday, and Friday and 1 tablet once daily by mouth on Tuesday, Thursday, Saturday, Sunday.      leflunomide (ARAVA) 20 MG tablet Take 20 mg by mouth daily In the morning      folic acid (FOLVITE) 1 MG tablet Take 1 mg by mouth daily.      predniSONE (DELTASONE) 1 MG tablet Take 3 mg by mouth daily In the morning      METHOTREXate 2.5 MG tablet Take 10 mg by mouth once a week On Tuesdays      pramipexole (MIRAPEX) 0.25 MG tablet Reported on 4/4/2017         STOP taking these medications       lisinopril (PRINIVIL,ZESTRIL) 30 MG tablet Comments:   Reason for Stopping:                   Discharge Instructions and Follow-Up:     Discharge Procedure Orders  Medication Therapy Management Referral   Referral Type: Med Therapy Management     Reason for your hospital stay   Order Comments: Atrial tachycardia (afib/aflutter)     Adult [facility] Follow-up and recommended labs and tests   Order Comments: Follow up with primary care provider, Comfort Sabillon, within 7 days to evaluate medication change, for hospital follow- up and regarding new diagnosis.  No follow up labs or test are needed.    Follow up with EP in clinic with Dr. Michele in 3-4 weeks regarding new diagnosis atrial fibrillation/flutter with rvr.   *Patient has been scheduled with Dr. Eaton (per the Cardiology Clinic)for his next available appointment on Monday, 7/5 at 8:00a. I have sent a message to see if she can be seen sooner with him*     Appointments on [place] and/or [facility] (with [facility] or [facility] provider or service). Call [phone] if you haven't heard regarding these appointments within 7 days of discharge.     Activity   Order Comments: Your activity upon discharge: activity as tolerated   Order Specific Question Answer Comments   Is discharge order? Yes      Full Code     Diet   Order Comments: Follow this diet upon discharge: Cardiac   Order Specific Question Answer Comments   Is discharge order? Yes             Discharge Disposition:   Home with          Condition on Discharge:   Discharge condition: Stable   Code status on discharge: Full Code      Date of service: 5/19/2017    The patient was discussed with Dr. Trent.    Verna Caba MD   PGY3  [phone]    I very much appreciated the opportunity to see and assess Mrs Amelia Michel in the hospital with CV Fellow and resident Dr Caba. After thorough discussion we planned to hold her for discharge on May 20 in order to modify her meds.     I agree with the note above which summarizes my findings and current recommendations.   Please do not hesitate to contact my office if you have any questions or concerns.      Pj Trent MD  Cardiac Arrhythmia Service  [facility]  [phone]

## 2017-05-20 LAB
ANION GAP SERPL CALCULATED.3IONS-SCNC: 10 MMOL/L (ref 3–14)
BUN SERPL-MCNC: 11 MG/DL (ref 7–30)
CALCIUM SERPL-MCNC: 8.3 MG/DL (ref 8.5–10.1)
CHLORIDE SERPL-SCNC: 106 MMOL/L (ref 94–109)
CO2 SERPL-SCNC: 19 MMOL/L (ref 20–32)
CREAT SERPL-MCNC: 0.74 MG/DL (ref 0.52–1.04)
ERYTHROCYTE [DISTWIDTH] IN BLOOD BY AUTOMATED COUNT: 17.9 % (ref 10–15)
GFR SERPL CREATININE-BSD FRML MDRD: 81 ML/MIN/1.7M2
GLUCOSE SERPL-MCNC: 108 MG/DL (ref 70–99)
HCT VFR BLD AUTO: 31.4 % (ref 35–47)
HGB BLD-MCNC: 10.6 G/DL (ref 11.7–15.7)
LACTATE BLD-SCNC: 1.5 MMOL/L (ref 0.7–2.1)
MAGNESIUM SERPL-MCNC: 2.1 MG/DL (ref 1.6–2.3)
MCH RBC QN AUTO: 35.8 PG (ref 26.5–33)
MCHC RBC AUTO-ENTMCNC: 33.8 G/DL (ref 31.5–36.5)
MCV RBC AUTO: 106 FL (ref 78–100)
PLATELET # BLD AUTO: 81 10E9/L (ref 150–450)
POTASSIUM SERPL-SCNC: 3.8 MMOL/L (ref 3.4–5.3)
RBC # BLD AUTO: 2.96 10E12/L (ref 3.8–5.2)
SODIUM SERPL-SCNC: 135 MMOL/L (ref 133–144)
WBC # BLD AUTO: 4.2 10E9/L (ref 4–11)

## 2017-05-20 PROCEDURE — 25000132 ZZH RX MED GY IP 250 OP 250 PS 637: Performed by: STUDENT IN AN ORGANIZED HEALTH CARE EDUCATION/TRAINING PROGRAM

## 2017-05-20 PROCEDURE — 25000132 ZZH RX MED GY IP 250 OP 250 PS 637: Performed by: INTERNAL MEDICINE

## 2017-05-20 PROCEDURE — 99356 ZZC PROLONGED SERV,INPATIENT,1ST HR: CPT | Mod: 25 | Performed by: INTERNAL MEDICINE

## 2017-05-20 PROCEDURE — 36415 COLL VENOUS BLD VENIPUNCTURE: CPT | Performed by: INTERNAL MEDICINE

## 2017-05-20 PROCEDURE — 25000131 ZZH RX MED GY IP 250 OP 636 PS 637: Performed by: STUDENT IN AN ORGANIZED HEALTH CARE EDUCATION/TRAINING PROGRAM

## 2017-05-20 PROCEDURE — 21400006 ZZH R&B CCU INTERMEDIATE UMMC

## 2017-05-20 PROCEDURE — 99233 SBSQ HOSP IP/OBS HIGH 50: CPT | Mod: 25 | Performed by: INTERNAL MEDICINE

## 2017-05-20 PROCEDURE — 25000125 ZZHC RX 250: Performed by: STUDENT IN AN ORGANIZED HEALTH CARE EDUCATION/TRAINING PROGRAM

## 2017-05-20 PROCEDURE — 25000128 H RX IP 250 OP 636: Performed by: STUDENT IN AN ORGANIZED HEALTH CARE EDUCATION/TRAINING PROGRAM

## 2017-05-20 PROCEDURE — 83605 ASSAY OF LACTIC ACID: CPT | Performed by: INTERNAL MEDICINE

## 2017-05-20 PROCEDURE — 85027 COMPLETE CBC AUTOMATED: CPT | Performed by: INTERNAL MEDICINE

## 2017-05-20 PROCEDURE — 80048 BASIC METABOLIC PNL TOTAL CA: CPT | Performed by: INTERNAL MEDICINE

## 2017-05-20 PROCEDURE — 25000128 H RX IP 250 OP 636: Performed by: INTERNAL MEDICINE

## 2017-05-20 PROCEDURE — 83735 ASSAY OF MAGNESIUM: CPT | Performed by: INTERNAL MEDICINE

## 2017-05-20 PROCEDURE — 25000125 ZZHC RX 250: Performed by: INTERNAL MEDICINE

## 2017-05-20 RX ORDER — NALOXONE HYDROCHLORIDE 0.4 MG/ML
.1-.4 INJECTION, SOLUTION INTRAMUSCULAR; INTRAVENOUS; SUBCUTANEOUS
Status: DISCONTINUED | OUTPATIENT
Start: 2017-05-20 | End: 2017-05-23 | Stop reason: HOSPADM

## 2017-05-20 RX ORDER — DIGOXIN 125 MCG
250 TABLET ORAL EVERY 6 HOURS
Status: DISCONTINUED | OUTPATIENT
Start: 2017-05-20 | End: 2017-05-20

## 2017-05-20 RX ORDER — POTASSIUM CHLORIDE 750 MG/1
20 TABLET, EXTENDED RELEASE ORAL ONCE
Status: COMPLETED | OUTPATIENT
Start: 2017-05-20 | End: 2017-05-20

## 2017-05-20 RX ORDER — DIGOXIN 125 MCG
500 TABLET ORAL ONCE
Status: COMPLETED | OUTPATIENT
Start: 2017-05-20 | End: 2017-05-20

## 2017-05-20 RX ORDER — TRAMADOL HYDROCHLORIDE 50 MG/1
50 TABLET ORAL EVERY 6 HOURS PRN
Status: DISCONTINUED | OUTPATIENT
Start: 2017-05-20 | End: 2017-05-21

## 2017-05-20 RX ORDER — DIGOXIN 125 MCG
125 TABLET ORAL DAILY
Status: DISCONTINUED | OUTPATIENT
Start: 2017-05-21 | End: 2017-05-20

## 2017-05-20 RX ADMIN — AMIODARONE HYDROCHLORIDE 150 MG: 1.5 INJECTION, SOLUTION INTRAVENOUS at 12:46

## 2017-05-20 RX ADMIN — Medication 2.5 ML: at 20:33

## 2017-05-20 RX ADMIN — POTASSIUM CHLORIDE 20 MEQ: 750 TABLET, EXTENDED RELEASE ORAL at 14:54

## 2017-05-20 RX ADMIN — ACETAMINOPHEN 650 MG: 325 TABLET, FILM COATED ORAL at 20:33

## 2017-05-20 RX ADMIN — METOPROLOL SUCCINATE 100 MG: 100 TABLET, EXTENDED RELEASE ORAL at 05:19

## 2017-05-20 RX ADMIN — DIGOXIN 500 MCG: 0.12 TABLET ORAL at 09:20

## 2017-05-20 RX ADMIN — APIXABAN 5 MG: 5 TABLET, FILM COATED ORAL at 20:33

## 2017-05-20 RX ADMIN — GABAPENTIN 200 MG: 100 CAPSULE ORAL at 08:17

## 2017-05-20 RX ADMIN — DILTIAZEM HYDROCHLORIDE 120 MG: 120 CAPSULE, EXTENDED RELEASE ORAL at 08:16

## 2017-05-20 RX ADMIN — GABAPENTIN 200 MG: 100 CAPSULE ORAL at 14:53

## 2017-05-20 RX ADMIN — DEXTROSE 1 MG/MIN: 5 SOLUTION INTRAVENOUS at 20:47

## 2017-05-20 RX ADMIN — GABAPENTIN 200 MG: 100 CAPSULE ORAL at 20:33

## 2017-05-20 RX ADMIN — APIXABAN 5 MG: 5 TABLET, FILM COATED ORAL at 08:16

## 2017-05-20 RX ADMIN — TRAMADOL HYDROCHLORIDE 50 MG: 50 TABLET, COATED ORAL at 22:56

## 2017-05-20 RX ADMIN — LEFLUNOMIDE 20 MG: 20 TABLET ORAL at 08:17

## 2017-05-20 RX ADMIN — ACETAMINOPHEN 650 MG: 325 TABLET, FILM COATED ORAL at 05:19

## 2017-05-20 RX ADMIN — HYDROXYCHLOROQUINE SULFATE 200 MG: 200 TABLET, FILM COATED ENTERAL at 08:17

## 2017-05-20 RX ADMIN — DEXTROSE 1 MG/MIN: 5 SOLUTION INTRAVENOUS at 14:23

## 2017-05-20 RX ADMIN — Medication 3 MG: at 08:31

## 2017-05-20 ASSESSMENT — PAIN DESCRIPTION - DESCRIPTORS
DESCRIPTORS: ACHING;DISCOMFORT
DESCRIPTORS: ACHING

## 2017-05-20 NOTE — PLAN OF CARE
"Problem: Arrhythmia/Dysrhythmia (Symptomatic) (Adult)  Goal: Signs and Symptoms of Listed Potential Problems Will be Absent or Manageable (Arrhythmia/Dysrhythmia)  Signs and symptoms of listed potential problems will be absent or manageable by discharge/transition of care (reference Arrhythmia/Dysrhythmia (Symptomatic) (Adult) CPG).   Outcome: No Change  D: Admitted 5/15 with Afib/flutter      I: Monitored vitals and assessed pt status.      Overnight:  Switching FREQUENTLY between afib (135-170s), aflutter (165) and Sinus/ sinus shelton  @ 05:00 patient in Afib 170, stating \"I cant handle this anymore, I turn to find a comfortable spot to sleep, and I'm in Afib again.\" all VSS. - MDs informed, morning 100mg Metoprolol given early (05:20), per crosscover.   Weight dropping 2lbs.     A: A0x4. VSS, on RA. NSR /AFIB/Aflutter  BPs stable throughout. Afebrile. Complains of headache just this morning, tylenol given. Voiding without measurement three times this shift. Dressing CDI on R heel. Patient slept between cares.     Temp:  [98.1  F (36.7  C)-99  F (37.2  C)] 98.4  F (36.9  C)  Pulse:  [70] 70  Heart Rate:  [] 65  Resp:  [16-22] 16  BP: ()/(59-83) 95/67  SpO2:  [96 %-100 %] 96 %       P: Continue to monitor Pt status and report changes to treatment team.                     "

## 2017-05-20 NOTE — PROVIDER NOTIFICATION
Time: 1950  Notified: Cards Cross cover at 3109/Dr. Bergeron  Reason: pt back to Afib RVR (130's-160's). Pt c/o palpations other wise asymptomatic. BP stable.   MD ordered to continue to monitor pt for now and call back in an hour if pt continues to be in afib RVR.

## 2017-05-20 NOTE — PLAN OF CARE
"Problem: Goal Outcome Summary  Goal: Goal Outcome Summary  D/I/A: Pt admitted 5/15 for atrial tachycardia; she was started on sotalol yesterday, which made her very dyspneic on exertion and overall, she \"felt miserable\" on sotalol and requested to be taken off of it. Sotalol d/c'd; Metoprolol XR 50 mg and Diltiazem 120 mg given today. Pt remained in SR throughout day until ~1815 in which pt went into A fib/Aflutter with RVR, -170's and was symptomatic with palpitations. MD's notified; metoprolol 50 mg (short-acting) ordered. Prior to giving the metoprolol, pt converted spontaneously back into SR; Metoprolol still given as ordered. Per MD's, pt is sensitive to IV forms of antidysrhythmics, which is why the po form of metoprolol was given.      P: Metoprolol XL daily dose increased from 50 mg to 100 mg for tomorrow. Monitor heart rhythm closely and notify Cards 2 for questions, changes, and/or concerns.     Eli Kennedy, RN  Cardiology       "

## 2017-05-20 NOTE — PROGRESS NOTES
"Cardiology Progress Note    S:  Patient with palpitations when in Afib/AFL with rapid rates.    O:  Blood pressure 105/81, pulse 70, temperature 98  F (36.7  C), temperature source Oral, resp. rate 16, height 1.473 m (4' 10\"), weight 63.9 kg (140 lb 12.8 oz), SpO2 96 %, not currently breastfeeding.    Gen: NAD  Pulm: CTAB  CV: tachycaric, irregular rhythm, s1 s2 heard  Ex: wwp no edema     Labs Reviewed.     A/P:  Amelia Michel is a 55yo F w/ RA, VSD s/p repair 1969, hypertension, and insomnia that presents with multiple atrial arrhythmias with RVR. ( SVT, Afib, A-flutter).       # New Afib-->AFL  CHADS-VASC of 2 (sex, htn).   - S/p failed DCCV (c/b hypotension post dccv)  - RVR on 5/18 did not respond to metoprolol  - RVR responsive to iv diltiazem but became hypotensive  - Started on sotolol per EP recs but patient not tolerating: nausea  - on 5/19, initiated rate control with diltiazem and metoprolol with plan to discharge. However, reverted to Afib/FL with RVR. 's-170's and symptomatic with palpitations.  - started to load with digoxin today (received 500 mg PO x1) then stopped this strategy to initiate amiodarone with loading. Initiate PO tomorrow: 400 mg PO BID x2 weeks then 200 mg daily  - Discussed with EP (Hamlet Eaton) RFA to be planned for Monday.   - eliquis 5mg bid   - Appreciate ep input   - Telemetry  - Mg>2, K>4  - holding lisinopril    Chronic Issues  # Rheumatoid arthitis  - Hydroxychloroquine 200mg BID m/w/f, 100mg qD T/Th/S/S  - Leflunomide 20mg qD  - Methotrexate 10mg qWk (due for dose 5/16)  - Prednisone 3mg qD   # Neuropathy  - Gabapentin 200mg TID  # Hypertension - holding lisinopril 30mg qD and will discontinue on discharge in context of newly initiated metoprolol and diltiazem.    Staffed with Dr. Mcnulty.    Verna Caba MD   PGY3  [phone]    Attending    I personally provided care for this patient, reviewed chart, discussed course with patient, housestaff and " consulting physicians.  I answered all questions.  I spent 90 minutes reviewing imaging and chart, coordinating care with EP attending and catherization laboratory    Harrison Mcnulty M.D.  Division of Cardiology  Department of Medicine    Impression:  1. Rheumatoid arthritis (Sunil Dumas M.D.)  2, Chronic immunmodulatory therapy  3. Atrial fibrillation with rapid ventricular response  4. Hydroxychloroquine  5. Initiation of amiodarone/candidate RF ablation  6. Intolerance to sotalol  7. History of VSD repair 1969  8. Peripheral neuropathy  9. Anemia with macrocytosis  10. Thrombocytopenia    Current Facility-Administered Medications   Medication     amiodarone (PACERONE/CODARONE) tablet 400 mg     traMADol (ULTRAM) tablet 50 mg     amiodarone (NEXTERONE) 250 mg in D5W 250 mL infusion     naloxone (NARCAN) injection 0.1-0.4 mg     diltiazem 24 hr capsule 120 mg     metoprolol (TOPROL-XL) 24 hr tablet 100 mg     apixaban ANTICOAGULANT (ELIQUIS) tablet 5 mg     ondansetron (ZOFRAN) injection 4 mg     hydroxychloroquine (PLAQUENIL) tablet 200 mg     diphenoxylate-atropine (LOMOTIL) liquid 2.5 mL     gabapentin (NEURONTIN) capsule 200 mg     leflunomide (ARAVA) tablet 20 mg     predniSONE (DELTASONE) half-tab 3 mg     lidocaine 1 % 1 mL     lidocaine (LMX4) kit     sodium chloride (PF) 0.9% PF flush 3 mL     sodium chloride (PF) 0.9% PF flush 3 mL     medication instruction     nitroglycerin (NITROSTAT) sublingual tablet 0.4 mg     alum & mag hydroxide-simethicone (MYLANTA ES/MAALOX  ES) suspension 15-30 mL     acetaminophen (TYLENOL) tablet 650 mg     acetaminophen (TYLENOL) Suppository 650 mg     methotrexate tablet CHEMO 10 mg     hydroxychloroquine (PLAQUENIL) tablet 200 mg        Ref. Range 5/19/2017 19:38 5/20/2017 08:36 5/20/2017 20:44 5/21/2017 08:00   Creatinine Latest Ref Range: 0.52 - 1.04 mg/dL  0.74     GFR Estimate Latest Ref Range: >60 mL/min/1.7m2  81     GFR Estimate If Black Latest Ref Range:  >60 mL/min/1.7m2  >90...     Calcium Latest Ref Range: 8.5 - 10.1 mg/dL  8.3 (L)     Anion Gap Latest Ref Range: 3 - 14 mmol/L  10     Magnesium Latest Ref Range: 1.6 - 2.3 mg/dL  2.1     Lactic Acid Latest Ref Range: 0.7 - 2.1 mmol/L 1.2  1.5    Glucose Latest Ref Range: 70 - 99 mg/dL  108 (H)     WBC Latest Ref Range: 4.0 - 11.0 10e9/L  4.2     Hemoglobin Latest Ref Range: 11.7 - 15.7 g/dL  10.6 (L)     Hematocrit Latest Ref Range: 35.0 - 47.0 %  31.4 (L)     Platelet Count Latest Ref Range: 150 - 450 10e9/L  81 (L)     RBC Count Latest Ref Range: 3.8 - 5.2 10e12/L  2.96 (L)     MCV Latest Ref Range: 78 - 100 fl  106 (H)     MCH Latest Ref Range: 26.5 - 33.0 pg  35.8 (H)     MCHC Latest Ref Range: 31.5 - 36.5 g/dL  33.8     RDW Latest Ref Range: 10.0 - 15.0 %  17.9 (H)     Color Urine Unknown    Yellow   Appearance Urine Unknown    Slightly Cloudy   Glucose Urine Latest Ref Range: NEG mg/dL    Negative   Bilirubin Urine Latest Ref Range: NEG     Negative   Ketones Urine Latest Ref Range: NEG mg/dL    Negative   Specific Gravity Urine Latest Ref Range: 1.003 - 1.035     1.017   pH Urine Latest Ref Range: 5.0 - 7.0 pH    5.5   Protein Albumin Urine Latest Ref Range: NEG mg/dL    30 (A)   Urobilinogen mg/dL Latest Ref Range: 0.0 - 2.0 mg/dL    Normal   Nitrite Urine Latest Ref Range: NEG     Positive (A)   Blood Urine Latest Ref Range: NEG     Moderate (A)   Leukocyte Esterase Urine Latest Ref Range: NEG     Large (A)   Source Unknown    Midstream Urine   WBC Urine Latest Ref Range: 0 - 2 /HPF    Pending   RBC Urine Latest Ref Range: 0 - 2 /HPF    Pending     Interpretation Summary  Normal left ventricular size and thickness with mildly reduced systolic  function. EF 45-50% (traced at 48%).  S/P VSD repair. Ventricular septum appears intact by color Doppler.  Normal right ventricular size with mildly reduced function.  Moderate biatrial enlargement is present.  Mild mitral regurgitation and moderate tricuspid  regurgitation. The mechanism  of tricuspid regurgitation appears to be tricuspid valve prolapse.  No pericardial effusion.     Compared to the prior study 1/14/2015 the LV and RV function are now reduced  and both the MR and most notably, the TR are new.    Age: 56 yrs  Gender: Female  [facility]  Reason For Study: Afib  History: atrial flutter  Ordering Physician: HEIDY FIGUEROA  Performed By: Charly Hamilton MD     BSA: 1.6 m2  Height: 58 in  Weight: 138 lb  HR: 103  BP: 94/67 mmHg  Attestation  I was present during BRE probe placement by the fellow, Charly Hamilton. I  personally viewed the imaging and agree with the interpretation and report as  documented by the fellow.  _____________________________________________________________________________  __     Interpretation Summary  Normal left ventricular size and thickness with mildly reduced systolic  function. EF 45-50%.  Moderate tricuspid regurgitation. The mechanism is prolapse of the anterior  tricuspid leaflet.  The left atrial appendage is normal. It is free of spontaneous echo contrast  and thrombus

## 2017-05-21 LAB
ALBUMIN UR-MCNC: 30 MG/DL
ANION GAP SERPL CALCULATED.3IONS-SCNC: 8 MMOL/L (ref 3–14)
APPEARANCE UR: ABNORMAL
BACTERIA #/AREA URNS HPF: ABNORMAL /HPF
BILIRUB UR QL STRIP: NEGATIVE
BUN SERPL-MCNC: 11 MG/DL (ref 7–30)
C DIFF TOX B STL QL: NORMAL
CALCIUM SERPL-MCNC: 7.9 MG/DL (ref 8.5–10.1)
CHLORIDE SERPL-SCNC: 105 MMOL/L (ref 94–109)
CO2 SERPL-SCNC: 21 MMOL/L (ref 20–32)
COLOR UR AUTO: YELLOW
CREAT SERPL-MCNC: 0.66 MG/DL (ref 0.52–1.04)
CRP SERPL-MCNC: 72 MG/L (ref 0–8)
ERYTHROCYTE [DISTWIDTH] IN BLOOD BY AUTOMATED COUNT: 18 % (ref 10–15)
GFR SERPL CREATININE-BSD FRML MDRD: ABNORMAL ML/MIN/1.7M2
GLUCOSE SERPL-MCNC: 118 MG/DL (ref 70–99)
GLUCOSE UR STRIP-MCNC: NEGATIVE MG/DL
HCT VFR BLD AUTO: 30.8 % (ref 35–47)
HGB BLD-MCNC: 10.4 G/DL (ref 11.7–15.7)
HGB UR QL STRIP: ABNORMAL
KETONES UR STRIP-MCNC: NEGATIVE MG/DL
LEUKOCYTE ESTERASE UR QL STRIP: ABNORMAL
MAGNESIUM SERPL-MCNC: 1.7 MG/DL (ref 1.6–2.3)
MCH RBC QN AUTO: 35.9 PG (ref 26.5–33)
MCHC RBC AUTO-ENTMCNC: 33.8 G/DL (ref 31.5–36.5)
MCV RBC AUTO: 106 FL (ref 78–100)
MUCOUS THREADS #/AREA URNS LPF: PRESENT /LPF
NITRATE UR QL: POSITIVE
PH UR STRIP: 5.5 PH (ref 5–7)
PLATELET # BLD AUTO: 75 10E9/L (ref 150–450)
POTASSIUM SERPL-SCNC: 3.9 MMOL/L (ref 3.4–5.3)
RBC # BLD AUTO: 2.9 10E12/L (ref 3.8–5.2)
RBC #/AREA URNS AUTO: 54 /HPF (ref 0–2)
SODIUM SERPL-SCNC: 134 MMOL/L (ref 133–144)
SP GR UR STRIP: 1.02 (ref 1–1.03)
SPECIMEN SOURCE: NORMAL
TRANS CELLS #/AREA URNS HPF: 1 /HPF (ref 0–1)
URN SPEC COLLECT METH UR: ABNORMAL
UROBILINOGEN UR STRIP-MCNC: NORMAL MG/DL (ref 0–2)
WBC # BLD AUTO: 4.8 10E9/L (ref 4–11)
WBC #/AREA URNS AUTO: >182 /HPF (ref 0–2)

## 2017-05-21 PROCEDURE — 25000125 ZZHC RX 250: Performed by: STUDENT IN AN ORGANIZED HEALTH CARE EDUCATION/TRAINING PROGRAM

## 2017-05-21 PROCEDURE — 87493 C DIFF AMPLIFIED PROBE: CPT | Performed by: STUDENT IN AN ORGANIZED HEALTH CARE EDUCATION/TRAINING PROGRAM

## 2017-05-21 PROCEDURE — 25000132 ZZH RX MED GY IP 250 OP 250 PS 637: Performed by: INTERNAL MEDICINE

## 2017-05-21 PROCEDURE — 81001 URINALYSIS AUTO W/SCOPE: CPT | Performed by: STUDENT IN AN ORGANIZED HEALTH CARE EDUCATION/TRAINING PROGRAM

## 2017-05-21 PROCEDURE — 93010 ELECTROCARDIOGRAM REPORT: CPT | Performed by: INTERNAL MEDICINE

## 2017-05-21 PROCEDURE — 80048 BASIC METABOLIC PNL TOTAL CA: CPT | Performed by: INTERNAL MEDICINE

## 2017-05-21 PROCEDURE — 86140 C-REACTIVE PROTEIN: CPT | Performed by: INTERNAL MEDICINE

## 2017-05-21 PROCEDURE — 93005 ELECTROCARDIOGRAM TRACING: CPT

## 2017-05-21 PROCEDURE — 36415 COLL VENOUS BLD VENIPUNCTURE: CPT | Performed by: STUDENT IN AN ORGANIZED HEALTH CARE EDUCATION/TRAINING PROGRAM

## 2017-05-21 PROCEDURE — 94762 N-INVAS EAR/PLS OXIMTRY CONT: CPT

## 2017-05-21 PROCEDURE — 21400006 ZZH R&B CCU INTERMEDIATE UMMC

## 2017-05-21 PROCEDURE — 25000125 ZZHC RX 250: Performed by: INTERNAL MEDICINE

## 2017-05-21 PROCEDURE — 25000132 ZZH RX MED GY IP 250 OP 250 PS 637: Performed by: STUDENT IN AN ORGANIZED HEALTH CARE EDUCATION/TRAINING PROGRAM

## 2017-05-21 PROCEDURE — 87040 BLOOD CULTURE FOR BACTERIA: CPT | Performed by: STUDENT IN AN ORGANIZED HEALTH CARE EDUCATION/TRAINING PROGRAM

## 2017-05-21 PROCEDURE — 99211 OFF/OP EST MAY X REQ PHY/QHP: CPT | Mod: 25

## 2017-05-21 PROCEDURE — 99233 SBSQ HOSP IP/OBS HIGH 50: CPT | Performed by: INTERNAL MEDICINE

## 2017-05-21 PROCEDURE — 87086 URINE CULTURE/COLONY COUNT: CPT | Performed by: INTERNAL MEDICINE

## 2017-05-21 PROCEDURE — 25000128 H RX IP 250 OP 636: Performed by: INTERNAL MEDICINE

## 2017-05-21 PROCEDURE — 25000131 ZZH RX MED GY IP 250 OP 636 PS 637: Performed by: STUDENT IN AN ORGANIZED HEALTH CARE EDUCATION/TRAINING PROGRAM

## 2017-05-21 PROCEDURE — 87186 SC STD MICRODIL/AGAR DIL: CPT | Performed by: INTERNAL MEDICINE

## 2017-05-21 PROCEDURE — 40000141 ZZH STATISTIC PERIPHERAL IV START W/O US GUIDANCE

## 2017-05-21 PROCEDURE — 40000982 ZZH STATISTICAL HAIKU-CANTO PHOTO

## 2017-05-21 PROCEDURE — 25000128 H RX IP 250 OP 636: Performed by: STUDENT IN AN ORGANIZED HEALTH CARE EDUCATION/TRAINING PROGRAM

## 2017-05-21 PROCEDURE — 99232 SBSQ HOSP IP/OBS MODERATE 35: CPT | Performed by: INTERNAL MEDICINE

## 2017-05-21 PROCEDURE — 40000556 ZZH STATISTIC PERIPHERAL IV START W US GUIDANCE

## 2017-05-21 PROCEDURE — 83735 ASSAY OF MAGNESIUM: CPT | Performed by: INTERNAL MEDICINE

## 2017-05-21 PROCEDURE — 85027 COMPLETE CBC AUTOMATED: CPT | Performed by: INTERNAL MEDICINE

## 2017-05-21 PROCEDURE — 86140 C-REACTIVE PROTEIN: CPT | Performed by: STUDENT IN AN ORGANIZED HEALTH CARE EDUCATION/TRAINING PROGRAM

## 2017-05-21 PROCEDURE — 87088 URINE BACTERIA CULTURE: CPT | Performed by: INTERNAL MEDICINE

## 2017-05-21 PROCEDURE — 40000275 ZZH STATISTIC RCP TIME EA 10 MIN

## 2017-05-21 PROCEDURE — 36415 COLL VENOUS BLD VENIPUNCTURE: CPT | Performed by: INTERNAL MEDICINE

## 2017-05-21 RX ORDER — POTASSIUM CHLORIDE 750 MG/1
20 TABLET, EXTENDED RELEASE ORAL ONCE
Status: COMPLETED | OUTPATIENT
Start: 2017-05-21 | End: 2017-05-21

## 2017-05-21 RX ORDER — CEFTRIAXONE 1 G/1
1 INJECTION, POWDER, FOR SOLUTION INTRAMUSCULAR; INTRAVENOUS EVERY 24 HOURS
Status: DISCONTINUED | OUTPATIENT
Start: 2017-05-21 | End: 2017-05-22

## 2017-05-21 RX ORDER — AMIODARONE HYDROCHLORIDE 200 MG/1
400 TABLET ORAL 2 TIMES DAILY
Status: DISCONTINUED | OUTPATIENT
Start: 2017-05-21 | End: 2017-05-23 | Stop reason: HOSPADM

## 2017-05-21 RX ORDER — LOPERAMIDE HCL 2 MG
2 CAPSULE ORAL 4 TIMES DAILY PRN
Status: DISCONTINUED | OUTPATIENT
Start: 2017-05-21 | End: 2017-05-23 | Stop reason: HOSPADM

## 2017-05-21 RX ORDER — TRAMADOL HYDROCHLORIDE 50 MG/1
50 TABLET ORAL EVERY 6 HOURS PRN
Status: DISCONTINUED | OUTPATIENT
Start: 2017-05-21 | End: 2017-05-23 | Stop reason: HOSPADM

## 2017-05-21 RX ADMIN — DEXTROSE 0.5 MG/MIN: 5 SOLUTION INTRAVENOUS at 02:34

## 2017-05-21 RX ADMIN — GABAPENTIN 200 MG: 100 CAPSULE ORAL at 08:50

## 2017-05-21 RX ADMIN — APIXABAN 5 MG: 5 TABLET, FILM COATED ORAL at 08:51

## 2017-05-21 RX ADMIN — Medication 2 G: at 14:15

## 2017-05-21 RX ADMIN — CEFTRIAXONE 1 G: 1 INJECTION, POWDER, FOR SOLUTION INTRAMUSCULAR; INTRAVENOUS at 17:42

## 2017-05-21 RX ADMIN — Medication 2.5 ML: at 16:40

## 2017-05-21 RX ADMIN — AMIODARONE HYDROCHLORIDE 400 MG: 200 TABLET ORAL at 22:09

## 2017-05-21 RX ADMIN — ACETAMINOPHEN 650 MG: 325 TABLET, FILM COATED ORAL at 20:08

## 2017-05-21 RX ADMIN — GABAPENTIN 200 MG: 100 CAPSULE ORAL at 22:09

## 2017-05-21 RX ADMIN — APIXABAN 5 MG: 5 TABLET, FILM COATED ORAL at 20:09

## 2017-05-21 RX ADMIN — Medication 2.5 ML: at 22:10

## 2017-05-21 RX ADMIN — Medication 3 MG: at 08:51

## 2017-05-21 RX ADMIN — POTASSIUM CHLORIDE 20 MEQ: 750 TABLET, EXTENDED RELEASE ORAL at 14:15

## 2017-05-21 RX ADMIN — LEFLUNOMIDE 20 MG: 20 TABLET ORAL at 08:51

## 2017-05-21 RX ADMIN — DILTIAZEM HYDROCHLORIDE 120 MG: 120 CAPSULE, EXTENDED RELEASE ORAL at 08:52

## 2017-05-21 RX ADMIN — METOPROLOL SUCCINATE 100 MG: 100 TABLET, EXTENDED RELEASE ORAL at 08:52

## 2017-05-21 RX ADMIN — HYDROXYCHLOROQUINE SULFATE 200 MG: 200 TABLET, FILM COATED ENTERAL at 08:52

## 2017-05-21 RX ADMIN — DEXTROSE 0.5 MG/MIN: 5 SOLUTION INTRAVENOUS at 13:05

## 2017-05-21 RX ADMIN — GABAPENTIN 200 MG: 100 CAPSULE ORAL at 14:14

## 2017-05-21 NOTE — PLAN OF CARE
Problem: Arrhythmia/Dysrhythmia (Symptomatic) (Adult)  Goal: Signs and Symptoms of Listed Potential Problems Will be Absent or Manageable (Arrhythmia/Dysrhythmia)  Signs and symptoms of listed potential problems will be absent or manageable by discharge/transition of care (reference Arrhythmia/Dysrhythmia (Symptomatic) (Adult) CPG).   Outcome: No Change  D: Admitted 5/15 with Afib/flutter       I: Monitored vitals and assessed pt status.   Running: Amiodarone (0.5mg/hr)  - (finished off 2 hours of 1mg prior to 0.5mg)  PRN: Ultram 50mg (x1)  Tylenol 650mg      Overnight:  Tmax 101.7 - Tylenol given. (no temps since)  Ultram for headache + back pain (pt attributes to lying in bed all day + RA)     A: A0x4. VSS, on RA. AFIB/Aflutter (with lower rates 135s most the night, up 170s) BPs stable throughout. Complains of headache. Voiding without measurement three-four times this shift. BM+ Dressing changed on R heel. Patient slept between cares.     Temp:  [98  F (36.7  C)-101.7  F (38.7  C)] 98.7  F (37.1  C)  Heart Rate:  [] 138  Resp:  [14-18] 16  BP: ()/(58-90) 119/90  SpO2:  [96 %-98 %] 98 %       P: Continue to monitor Pt status and report changes to treatment team.

## 2017-05-21 NOTE — PROGRESS NOTES
"Cardiology Progress Note    S:  Patient with palpitations when in Afib/AFL with rapid rates.  MONTALVO.  Febrile overnight to 101.7  New diarrhea this afternoon; testing for cdiff.     O:  Blood pressure 119/90, pulse 70, temperature 98.7  F (37.1  C), temperature source Oral, resp. rate 16, height 1.473 m (4' 10\"), weight 63.5 kg (140 lb 1.6 oz), SpO2 98 %, not currently breastfeeding.    Gen: NAD  Pulm: CTAB  CV: tachycaric, irregular rhythm, s1 s2 heard  Ex: wwp no edema     Labs Reviewed.     A/P:  Amelia Michel is a 55yo F w/ RA, VSD s/p repair 1969, hypertension, and insomnia that presents with multiple atrial arrhythmias with RVR. ( SVT, Afib, A-flutter).       # New Afib-->AFL, pSVT  CHADS-VASC of 2 (sex, htn). Rates not controlled.   - S/p failed DCCV (c/b hypotension post dccv)  - RVR on 5/18 did not respond to metoprolol  - RVR responsive to iv diltiazem but became hypotensive  - Started on sotolol per EP recs but patient not tolerating: nausea  - on 5/19, initiated rate control with diltiazem and metoprolol with plan to discharge. However, reverted to Afib/FL with RVR. 's-170's and symptomatic with palpitations.  - started to load with digoxin today (received 500 mg PO x1) then stopped this strategy to initiate amiodarone with loading. Initiate PO today at 2000: 400 mg PO BID x2 weeks then 200 mg daily  - Discussed with EP (Hamlet Eaton) RFA to be planned for Monday.   - eliquis 5mg bid; hold morning dose for possible EP RFA in am  -NPO at MN  - Appreciate ep input   - Telemetry  - Mg>2, K>4  - holding lisinopril    # Fever  # Diarrhea  Tmax 101.7 on 5/20. Diaphoretic at times per patient. Denies new cough, dysuria, later had episode of watery diarrhea.  -pan-culture: blood cx x2, sputum cx, ua/uc  -check cdiff- ordered    Chronic Issues  # Rheumatoid arthitis  - Hydroxychloroquine 200mg BID m/w/f, 100mg qD T/Th/S/S  - Leflunomide 20mg qD  - Methotrexate 10mg qWk (due for dose 5/16)  - " Prednisone 3mg qD   # Neuropathy  - Gabapentin 200mg TID  # Hypertension - holding lisinopril 30mg qD and will discontinue on discharge in context of newly initiated metoprolol and diltiazem.    Staffed with Dr. Dickson.    Verna Caba MD   PGY3  [phone]

## 2017-05-21 NOTE — PLAN OF CARE
Problem: Goal Outcome Summary  Goal: Goal Outcome Summary     D/I/A: Pt had Tmax of 101.7 last evening, 99.3 this am.  Blood Cultures, UA, and sputum cx ordered. UA is positive for UTI, pt started Rocephin 1 gram IV daily.      At approximately 1000 this morning, IV insertion site with amiodarone infusing appeared slightly swollen and red. Amiodarone stopped, order placed for Vascular Access to assess for extravasation; pt's IV site determined to be extravasated, PIV d/c'd, new PIV placed and amiodarone restarted ~1200. No further concerns of extravasation with new peripheral IV. Amiodarone infusion to be discontinued at 1900 this evening, two hours later than originally planned d/t time patient was without gtt running while being assessed by Vascular Access/having new PIV placed, etc. Vascular Access following patient per extravasation protocol; they outlined the the erythema and took pictures of the site. They will be back tomorrow to assess for any the site for any changes.     At 1130, pt began having frequent loose/watery brown stools; c-diff culture ordered and sent to lab and result was negative. Lomotil given to assist with diarrhea.     Patient remained in Atrial Fib/Atrial Flutter today with -170's, stable BP of 110-120's/70-80's.     Pt's magnesium level today was 1.7, replaced with 2 grams of IV magnesium. Potassium level 3.9, replaced with 20 mEq of K+.     P: Amiodarone gtt to discontinue at 1900, po amiodarone to be given ~2 hours after. NPO at midnight for ablation tomorrow. Pt to have overnight oximetry study this evening. Contact Cards I for any questions, concerns, and/or changes.     Eli Kennedy, ABBEY  Cardiology

## 2017-05-21 NOTE — PROGRESS NOTES
57 yo female with SVT/A.fib/Atrial flutter (probably atypical flutter). Patient still having tachycardia. Telemetry today shows atrial tachycardia with TCL of 360ms with AV Wenckebach. She's on amiodarone, diltiazem, metoprolol, and apixaban. She did have fever last night with WBC in urinalysis. She has diarrhea and sending stool sample to rule out C.diff infection. We will NPO her AMN. Dr. Eaton will reevaluate patient in AM and discuss with her regarding the timing of EP study with possible ablation. I informed patient and she states understanding.    Discussed with Dr. Eaton.    Sindy Hernandez  EP fellow

## 2017-05-21 NOTE — PLAN OF CARE
Problem: Goal Outcome Summary  Goal: Goal Outcome Summary  D/I/A: In am, pt was in A-fib/A-flutter with HR's in 160's, up to 170's when up to bathroom; pt was symptomatic with palpitations. Pt given diltiazem 120 mg and digoxin 500 mcg this am. Amiodarone bolus and gtt ordered at 11:30, pt converted to SR with PAC's at 1200. Confirmed with MD that amiodarone was still to be given. At 1230, pt had 15 beats of V-tach with HR in the 190's; MD's notified, 20 mEq of K+ given for a K+ of 3.8; no further bursts of V-tach since K+ was given. Amiodarone bolus started at 1245 and was ordered to be given over 45 minutes. Contacted pharmacy requesting amiodarone gtts around 1300; received both gtts (the 1 mg/min bag and 0.5 mg/min gtts, both with a volume of 250 cc) 30-45 minutes later. First 250 cc gtt that ran at 1 mg/min or 60 mL/hour completed in four hours (at 1820), additional bag of 1 mg/min requested to fulfill the six hours at 60 mL/hour order. Pharmacy unable to fill this as the 'end time' of the order had passed. MD contacted and updated end time to 2000 to allow for pharmacy to make a new label for the 1 mg/min gtt, thus, allowing patient to receive the additional two hours at 60 mL/hour. Requested amiodarone gtt stat from pharmacy at 1845. Of note, patient went back into A-fib/A-flutter at 1745 with -140's.     P: Pt to receive two more hours of amiodarone IV at 1 mg/min (60 mL/hr) then 18 hours of amiodarone IV at 0.5 mg/min (30 mL/hour). Continue to monitor heart rate and rhythm closely. Notify MD's for any changes, questions, and/or concerns.     Eli Kennedy, RN  Cardiology

## 2017-05-22 ENCOUNTER — APPOINTMENT (OUTPATIENT)
Dept: MRI IMAGING | Facility: CLINIC | Age: 57
DRG: 309 | End: 2017-05-22
Attending: STUDENT IN AN ORGANIZED HEALTH CARE EDUCATION/TRAINING PROGRAM
Payer: COMMERCIAL

## 2017-05-22 LAB
ANION GAP SERPL CALCULATED.3IONS-SCNC: 6 MMOL/L (ref 3–14)
BUN SERPL-MCNC: 13 MG/DL (ref 7–30)
CALCIUM SERPL-MCNC: 7.9 MG/DL (ref 8.5–10.1)
CHLORIDE SERPL-SCNC: 109 MMOL/L (ref 94–109)
CO2 SERPL-SCNC: 22 MMOL/L (ref 20–32)
CREAT SERPL-MCNC: 0.7 MG/DL (ref 0.52–1.04)
ERYTHROCYTE [DISTWIDTH] IN BLOOD BY AUTOMATED COUNT: 18.4 % (ref 10–15)
GFR SERPL CREATININE-BSD FRML MDRD: 87 ML/MIN/1.7M2
GLUCOSE SERPL-MCNC: 118 MG/DL (ref 70–99)
HCT VFR BLD AUTO: 30.2 % (ref 35–47)
HGB BLD-MCNC: 9.8 G/DL (ref 11.7–15.7)
MAGNESIUM SERPL-MCNC: 2 MG/DL (ref 1.6–2.3)
MCH RBC QN AUTO: 35.1 PG (ref 26.5–33)
MCHC RBC AUTO-ENTMCNC: 32.5 G/DL (ref 31.5–36.5)
MCV RBC AUTO: 108 FL (ref 78–100)
PLATELET # BLD AUTO: 81 10E9/L (ref 150–450)
POTASSIUM SERPL-SCNC: 4 MMOL/L (ref 3.4–5.3)
RBC # BLD AUTO: 2.79 10E12/L (ref 3.8–5.2)
SODIUM SERPL-SCNC: 137 MMOL/L (ref 133–144)
WBC # BLD AUTO: 4.1 10E9/L (ref 4–11)

## 2017-05-22 PROCEDURE — 25000128 H RX IP 250 OP 636: Performed by: INTERNAL MEDICINE

## 2017-05-22 PROCEDURE — 25000132 ZZH RX MED GY IP 250 OP 250 PS 637: Performed by: STUDENT IN AN ORGANIZED HEALTH CARE EDUCATION/TRAINING PROGRAM

## 2017-05-22 PROCEDURE — 25500064 ZZH RX 255 OP 636: Performed by: INTERNAL MEDICINE

## 2017-05-22 PROCEDURE — 36415 COLL VENOUS BLD VENIPUNCTURE: CPT | Performed by: INTERNAL MEDICINE

## 2017-05-22 PROCEDURE — A9577 INJ MULTIHANCE: HCPCS | Performed by: INTERNAL MEDICINE

## 2017-05-22 PROCEDURE — 80048 BASIC METABOLIC PNL TOTAL CA: CPT | Performed by: INTERNAL MEDICINE

## 2017-05-22 PROCEDURE — 21400006 ZZH R&B CCU INTERMEDIATE UMMC

## 2017-05-22 PROCEDURE — 25000125 ZZHC RX 250: Performed by: STUDENT IN AN ORGANIZED HEALTH CARE EDUCATION/TRAINING PROGRAM

## 2017-05-22 PROCEDURE — 85027 COMPLETE CBC AUTOMATED: CPT | Performed by: INTERNAL MEDICINE

## 2017-05-22 PROCEDURE — 40000982 ZZH STATISTICAL HAIKU-CANTO PHOTO

## 2017-05-22 PROCEDURE — 83735 ASSAY OF MAGNESIUM: CPT | Performed by: INTERNAL MEDICINE

## 2017-05-22 PROCEDURE — 75561 CARDIAC MRI FOR MORPH W/DYE: CPT

## 2017-05-22 PROCEDURE — 99211 OFF/OP EST MAY X REQ PHY/QHP: CPT | Mod: 25

## 2017-05-22 PROCEDURE — 25000131 ZZH RX MED GY IP 250 OP 636 PS 637: Performed by: STUDENT IN AN ORGANIZED HEALTH CARE EDUCATION/TRAINING PROGRAM

## 2017-05-22 PROCEDURE — 71555 MRI ANGIO CHEST W OR W/O DYE: CPT

## 2017-05-22 PROCEDURE — 99232 SBSQ HOSP IP/OBS MODERATE 35: CPT | Mod: GC | Performed by: INTERNAL MEDICINE

## 2017-05-22 RX ORDER — ERTAPENEM 1 G/1
1 INJECTION, POWDER, LYOPHILIZED, FOR SOLUTION INTRAMUSCULAR; INTRAVENOUS EVERY 24 HOURS
Status: DISCONTINUED | OUTPATIENT
Start: 2017-05-22 | End: 2017-05-23

## 2017-05-22 RX ORDER — NITROFURANTOIN 25; 75 MG/1; MG/1
100 CAPSULE ORAL EVERY 12 HOURS SCHEDULED
Status: DISCONTINUED | OUTPATIENT
Start: 2017-05-22 | End: 2017-05-22

## 2017-05-22 RX ADMIN — APIXABAN 5 MG: 5 TABLET, FILM COATED ORAL at 09:19

## 2017-05-22 RX ADMIN — NITROFURANTOIN (MONOHYDRATE/MACROCRYSTALS) 100 MG: 75; 25 CAPSULE ORAL at 19:53

## 2017-05-22 RX ADMIN — TRAMADOL HYDROCHLORIDE 50 MG: 50 TABLET, COATED ORAL at 13:45

## 2017-05-22 RX ADMIN — Medication 3 MG: at 08:23

## 2017-05-22 RX ADMIN — DILTIAZEM HYDROCHLORIDE 120 MG: 120 CAPSULE, EXTENDED RELEASE ORAL at 08:23

## 2017-05-22 RX ADMIN — NITROFURANTOIN (MONOHYDRATE/MACROCRYSTALS) 100 MG: 75; 25 CAPSULE ORAL at 12:11

## 2017-05-22 RX ADMIN — APIXABAN 5 MG: 5 TABLET, FILM COATED ORAL at 19:45

## 2017-05-22 RX ADMIN — LEFLUNOMIDE 20 MG: 20 TABLET ORAL at 08:23

## 2017-05-22 RX ADMIN — HYDROXYCHLOROQUINE SULFATE 200 MG: 200 TABLET, FILM COATED ENTERAL at 08:23

## 2017-05-22 RX ADMIN — ERTAPENEM SODIUM 1 G: 1 INJECTION, POWDER, LYOPHILIZED, FOR SOLUTION INTRAMUSCULAR; INTRAVENOUS at 22:43

## 2017-05-22 RX ADMIN — ONDANSETRON 4 MG: 2 INJECTION INTRAMUSCULAR; INTRAVENOUS at 22:22

## 2017-05-22 RX ADMIN — METOPROLOL SUCCINATE 100 MG: 100 TABLET, EXTENDED RELEASE ORAL at 08:23

## 2017-05-22 RX ADMIN — GABAPENTIN 200 MG: 100 CAPSULE ORAL at 19:45

## 2017-05-22 RX ADMIN — GADOBENATE DIMEGLUMINE 15 ML: 529 INJECTION, SOLUTION INTRAVENOUS at 15:24

## 2017-05-22 RX ADMIN — HYDROXYCHLOROQUINE SULFATE 200 MG: 200 TABLET, FILM COATED ENTERAL at 19:45

## 2017-05-22 RX ADMIN — AMIODARONE HYDROCHLORIDE 400 MG: 200 TABLET ORAL at 19:45

## 2017-05-22 RX ADMIN — Medication 2.5 ML: at 08:22

## 2017-05-22 RX ADMIN — GABAPENTIN 200 MG: 100 CAPSULE ORAL at 13:47

## 2017-05-22 RX ADMIN — AMIODARONE HYDROCHLORIDE 400 MG: 200 TABLET ORAL at 08:23

## 2017-05-22 RX ADMIN — GABAPENTIN 200 MG: 100 CAPSULE ORAL at 08:23

## 2017-05-22 RX ADMIN — Medication 2.5 ML: at 19:46

## 2017-05-22 ASSESSMENT — PAIN DESCRIPTION - DESCRIPTORS
DESCRIPTORS: ACHING
DESCRIPTORS: ACHING;CONSTANT

## 2017-05-22 NOTE — PLAN OF CARE
Problem: Arrhythmia/Dysrhythmia (Symptomatic) (Adult)  Goal: Signs and Symptoms of Listed Potential Problems Will be Absent or Manageable (Arrhythmia/Dysrhythmia)  Signs and symptoms of listed potential problems will be absent or manageable by discharge/transition of care (reference Arrhythmia/Dysrhythmia (Symptomatic) (Adult) CPG).   Outcome: No Change  D: Admitted 5/15 with Afib/flutter/ PSVT      I: Monitored vitals and assessed pt status.   PRN:Tylenol 650mg      Overnight:  Tmax 100.7 - Tylenol given. (no temps since)  PO Amio started  -  Converted to NSR @ 3:30, SR since.  NPO @ MN for Ablation   IV site from Amiodarone extravasation WNL.  UTI +      A: A0x4. VSS, on RA. AFIB/PSVT (170s) BPs stable throughout. Complains of headache. Adequate UO. Voiding without measurement two times this shift. BM+, loose (x1). Patient slept between cares.        Temp:  [98  F (36.7  C)-100.7  F (38.2  C)] 98  F (36.7  C)  Heart Rate:  [] 78  Resp:  [16] 16  BP: (111-137)/(79-87) 137/86  SpO2:  [96 %-99 %] 98 %       P: Continue to monitor Pt status and report changes to treatment team.

## 2017-05-22 NOTE — PROGRESS NOTES
"Cardiology Progress Note    S:  Patient with palpitations when in Afib/AFL/ SVT.  MONTALVO.  No dysuria. Cdiff negative.  NSR since 3:30am    O:  Blood pressure 137/86, pulse 70, temperature 98  F (36.7  C), temperature source Oral, resp. rate 16, height 1.473 m (4' 10\"), weight 63.3 kg (139 lb 9.6 oz), SpO2 98 %, not currently breastfeeding.    Gen: NAD  Pulm: CTAB  CV: tachycaric, irregular rhythm, s1 s2 heard  Ex: wwp no edema     Labs Reviewed.   UA RESULTS:  Recent Labs   Lab Test  05/21/17   0800   COLOR  Yellow   APPEARANCE  Slightly Cloudy   URINEGLC  Negative   URINEBILI  Negative   URINEKETONE  Negative   SG  1.017   UBLD  Moderate*   URINEPH  5.5   PROTEIN  30*   NITRITE  Positive*   LEUKEST  Large*   RBCU  54*   WBCU  >182*     All cultures:    Recent Labs  Lab 05/21/17  0850 05/21/17  0846 05/21/17  0800   CULT No growth after 17 hours No growth after 17 hours >100,000 colonies/mL Escherichia coliSusceptibility testing in progress*     A/P:  Amelia Michel is a 55yo F w/ RA, VSD s/p repair 1969, hypertension, and insomnia that presents with multiple atrial arrhythmias with RVR. ( SVT, Afib, A-flutter).       # New Afib-->AFL, pSVT  CHADS-VASC of 2 (sex, htn). Rates not controlled.   - S/p failed DCCV (c/b hypotension post dccv)  - Started on sotolol per EP recs but patient did not tolerate: nausea  - on 5/19, initiated rate control with diltiazem and metoprolol with plan to discharge. However, reverted to Afib/FL with RVR., SVT. 's-170's and symptomatic with palpitations.  - No s/p iv amiodarone loading. PO amiodarone: 400 mg PO BID x2 weeks then 200 mg daily  - eliquis 5mg bid  - Appreciate ep input: plan for cardiac MRI today due to new atypical arrhythmias that acutely appeared 3 weeks ago. Rule out myocarditis.  - Telemetry  - Mg>2, K>4  - holding lisinopril    # Fever  # UTI  # Diarrhea  Tmax 100.7 on 5/21.   -will treat for UTI: large LE, positive nitrite, moderate blood, WBC >182, mod " bacteria  -ceftriaxone q24 hrs until susceptibilities return: E.Coli; can likely transition to nitrofurantoin (96% susceptibility at [facility])  -pan-culture: blood cx x2, sputum cx  -cdiff negative    Chronic Issues  # Rheumatoid arthitis  - Hydroxychloroquine 200mg BID m/w/f, 100mg qD T/Th/S/S  - Leflunomide 20mg qD  - Methotrexate 10mg qWk (due for dose 5/16)  - Prednisone 3mg qD   # Neuropathy  - Gabapentin 200mg TID  # Hypertension - holding lisinopril 30mg qD and will discontinue on discharge in context of newly initiated metoprolol and diltiazem.    Staffed with Dr. Gamboa.    Verna Caba MD  IM PGY3  [phone]

## 2017-05-22 NOTE — PROGRESS NOTES
Electrophysiology Consult Service  Follow up Note   EP Attending:    Date of Service: 5/22/2017     S: We continue to follow this patient for AF/AFL management. She underwent BRE/DCCV 5/17/17 which was complicated by hypotension. She then went into SVT 5/18/17HRs 170's. She was given IV diltiazem bolus and had softer BPs following. She then went into into AF/-150's. She was started on Sotalol and converted to sinus. However, she did not tolerate Sotalol d/t nausea. She reverted back into AF/AFL and a rate control strategy was adopted. She proved to be difficult to rate control and remained highly symptomatic. Therefore, she was started on amiodarone. She converted back to sinus around 330am today (5/22/17). She reports feeling fatigued. Of note, she has been found to have UTI. She was also having diarrhea with C.Diff test negative. She is hemodynamically stable.   HPI:  Ms. Michel is a 56 year old female who has a past medical history significant for VSD s/p repair 1969, RA, HTN, and insomnia. She presented to [facility] reporting tachycardia and palpitations. Presenting 12 lead ECG shows AF. She was given IV adenosine and diltiazem in the ER. She organized to an AFL and continues to be in AFL. She reports se first developed symptoms of palpitations and tachycardia about 3 weeks ago. She noticed her HRs up to 150 bpm and this lasted for about 48 hours and then her HRs returned back to baseline. She then had another 36 hour episode. Then most recently on 5/12/17, she developed the same symptoms again but this persisted and she developed fatigue and nausea. This prompted her to come in for evaluation. An echo today shows LVEF 40-45%, mildly reduced RVEF, moderate biatrial enlargement, mild mitral regurgitation, and moderate tricuspid regurgitation. Prior CMRI from 2015 showed normal function and no significant valvular abnormalities. She denies any chest pain, dizziness, lightheadedness, shortness of  "breath, or syncopal symptoms. Telemetry shows 's. Renal function and electrolytes stable. She continues to endorse palpitations. She is hemodynamically stable. Current cardiac medications include: ASA and Lisinopril.   O:   Vitals: /78 (BP Location: Left arm)  Pulse 74  Temp 98.5  F (36.9  C) (Oral)  Resp 16  Ht 1.473 m (4' 10\")  Wt 63.3 kg (139 lb 9.6 oz)  SpO2 98%  BMI 29.18 kg/m2  GENERAL APPEARANCE: AxO, NAD   HEENT: NCAT, EOMI, MMM. PERRLA.   NECK: Supple.   CHEST: CTAB   CARDIOVASCULAR: Regular, soft murmur.   ABDOMEN: NT, ND, BS+, soft   EXTREMITIES: No pedal edema. Distal pulses intact.   NEURO: Grossly nonfocal.   PSYCH: Normal affect.  SKIN: Warm and dry.    Data:  Labs:  BMP    Recent Labs  Lab 05/21/17  0843 05/20/17  0836 05/19/17  0403 05/18/17  0818    135 135 138   POTASSIUM 3.9 3.8 4.1 3.8   CHLORIDE 105 106 108 110*   VIKTORIYA 7.9* 8.3* 8.0* 7.8*   CO2 21 19* 20 20   BUN 11 11 15 19   CR 0.66 0.74 0.74 0.65   * 108* 82 97     CBC    Recent Labs  Lab 05/21/17  0843 05/20/17  0836 05/19/17  0403 05/18/17  0818   WBC 4.8 4.2 3.6* 2.6*   RBC 2.90* 2.96* 2.66* 2.60*   HGB 10.4* 10.6* 9.6* 9.2*   HCT 30.8* 31.4* 28.3* 28.3*   * 106* 106* 109*   MCH 35.9* 35.8* 36.1* 35.4*   MCHC 33.8 33.8 33.9 32.5   RDW 18.0* 17.9* 17.8* 18.1*   PLT 75* 81* 84* 74*     INR    Recent Labs  Lab 05/15/17  1800   INR 1.0     EKG:       Meds per [facility] EMR:  Current Facility-Administered Medications   Medication     amiodarone (PACERONE/CODARONE) tablet 400 mg     traMADol (ULTRAM) tablet 50 mg     apixaban ANTICOAGULANT (ELIQUIS) tablet 5 mg     cefTRIAXone (ROCEPHIN) 1 g vial to attach to  mL bag for ADULTS or NS 50 mL bag for PEDS     loperamide (IMODIUM) capsule 2 mg     naloxone (NARCAN) injection 0.1-0.4 mg     diltiazem 24 hr capsule 120 mg     metoprolol (TOPROL-XL) 24 hr tablet 100 mg     ondansetron (ZOFRAN) injection 4 mg     hydroxychloroquine (PLAQUENIL) tablet 200 mg     " diphenoxylate-atropine (LOMOTIL) liquid 2.5 mL     gabapentin (NEURONTIN) capsule 200 mg     leflunomide (ARAVA) tablet 20 mg     predniSONE (DELTASONE) half-tab 3 mg     lidocaine 1 % 1 mL     lidocaine (LMX4) kit     sodium chloride (PF) 0.9% PF flush 3 mL     sodium chloride (PF) 0.9% PF flush 3 mL     medication instruction     nitroglycerin (NITROSTAT) sublingual tablet 0.4 mg     alum & mag hydroxide-simethicone (MYLANTA ES/MAALOX  ES) suspension 15-30 mL     acetaminophen (TYLENOL) tablet 650 mg     acetaminophen (TYLENOL) Suppository 650 mg     methotrexate tablet CHEMO 10 mg     hydroxychloroquine (PLAQUENIL) tablet 200 mg     5/15/17 Echo:  Interpretation Summary  Normal left ventricular size and thickness with mildly reduced systolic  function. EF 45-50% (traced at 48%).  S/P VSD repair. Ventricular septum appears intact by color Doppler.  Normal right ventricular size with mildly reduced function.  Moderate biatrial enlargement is present.  Mild mitral regurgitation and moderate tricuspid regurgitation. The mechanism  of tricuspid regurgitation appears to be tricuspid valve prolapse.  No pericardial effusion.     Compared to the prior study 1/14/2015 the LV and RV function are now reduced  and both the MR and most notably, the TR are new.    A:   Ms. Michel is a 56 year old female who has a past medical history significant for VSD s/p repair 1969, RA, HTN, and insomnia. She was admitted with AFL with RVR. She has had a 1 month history of recurrent arrhyhtmia and has had SVT, AF, and AFL during this hospitalization. An echo showed LVEF 40-45%, mildly reduced RVEF, moderate biatrial enlargement, mild mitral regurgitation, and moderate tricuspid regurgitation. Prior CMRI from 2015 showed normal function and no significant valvular abnormalities. She underwent BRE/DCCV 5/17/17 which was complicated by hypotension. She then went into SVT 5/18/17HRs 170's. She was given IV diltiazem bolus and had softer BPs  following. She then went into into AF/-150's. She was started on Sotalol and converted to sinus. However, she did not tolerate Sotalol d/t nausea. She reverted back into AF/AFL and a rate control strategy was adopted. She proved to be difficult to rate control and remained highly symptomatic. Therefore, she was started on amiodarone. She converted back to sinus around 330am today (17). She reports feeling fatigued. Of note, she has been found to have UTI. She was also having diarrhea with C.Diff test negative. She is hemodynamically stable.   EP recommendations:   We discussed in detail with the patient management/treatment options for AF/AFL includin. Stroke Prophylaxis: CHADSVASC=+HTN, +gender 2, corresponding to a 2.2% annual stroke / systemic emolism event rate. indicating need for long term oral anticoagulation. She has been started on Eliquis.   2. Rate Control: Continue Diltiazem and Metoprolol.    3. Rhythm Control: Given frequency and duration of symptoms and recurrent nature, she has been started on AAT. She did not tolerate Sotalol. She has therefore been started on amiodarone. She converted to sinus this morning. She is concerned about being on amiodarone long term given side effect profile. We will continue for now and do further work up to evaluate for triggers/causes for arrhyhtmia. We will consider ablation for refractory arrhythmias or if she is in tolerate/dose not want chronic AATs. This would preferably be after she is treated for UTI and further work up completed.      4. Risk Factor Management:maintain tight BP control and PB evaluation as an outpatient.     Give acute onset and more incessant nature of her multiple atrial arrhythmias, we would recommend obtaining CMRI to evaluate for any nidus for arrhyhtmia.   The patient states understanding and is agreeable with plan.   Thank you much for allowing us to participate in the care of this pleasant patient.   This patient was  discussed with  and the note above reflects our joint assessment and plan.   ELIZABETH Verde CNP  Electrophysiology Consult Service  Pager: [phone]      EP STAFF NOTE  I have seen and examined the patient as part of a shared visit with GERA Verde NP.  I agree with the note above. I reviewed today's vital signs and medications. I have reviewed and discussed with the advanced practice provider their physical examination, assessment, and plan   Briefly, patient with recurrent AT/AFib, SVT episodes too, intolerant of sotalol.  My key history/exam findings are: in sinus rhythm on exam.   The key management decisions made by me: amiodarone until UTI and other acute issues treated, then consider ablation. CMR to rule out inflammatory CM because all these SVTs are fairly recent in onset and resistent.    Juan Eaton MD [facility]  Cardiology - Electrophysiology

## 2017-05-23 ENCOUNTER — APPOINTMENT (OUTPATIENT)
Dept: GENERAL RADIOLOGY | Facility: CLINIC | Age: 57
DRG: 309 | End: 2017-05-23
Attending: INTERNAL MEDICINE
Payer: COMMERCIAL

## 2017-05-23 ENCOUNTER — APPOINTMENT (OUTPATIENT)
Dept: PHYSICAL THERAPY | Facility: CLINIC | Age: 57
DRG: 309 | End: 2017-05-23
Attending: STUDENT IN AN ORGANIZED HEALTH CARE EDUCATION/TRAINING PROGRAM
Payer: COMMERCIAL

## 2017-05-23 VITALS
OXYGEN SATURATION: 97 % | HEIGHT: 58 IN | DIASTOLIC BLOOD PRESSURE: 53 MMHG | BODY MASS INDEX: 29.8 KG/M2 | HEART RATE: 75 BPM | TEMPERATURE: 98.3 F | RESPIRATION RATE: 16 BRPM | SYSTOLIC BLOOD PRESSURE: 125 MMHG | WEIGHT: 141.98 LBS

## 2017-05-23 LAB
ANION GAP SERPL CALCULATED.3IONS-SCNC: 10 MMOL/L (ref 3–14)
BUN SERPL-MCNC: 17 MG/DL (ref 7–30)
CALCIUM SERPL-MCNC: 8.2 MG/DL (ref 8.5–10.1)
CHLORIDE SERPL-SCNC: 104 MMOL/L (ref 94–109)
CO2 SERPL-SCNC: 20 MMOL/L (ref 20–32)
CREAT SERPL-MCNC: 0.77 MG/DL (ref 0.52–1.04)
ERYTHROCYTE [DISTWIDTH] IN BLOOD BY AUTOMATED COUNT: 18.5 % (ref 10–15)
GFR SERPL CREATININE-BSD FRML MDRD: 77 ML/MIN/1.7M2
GLUCOSE SERPL-MCNC: 92 MG/DL (ref 70–99)
HCT VFR BLD AUTO: 29.5 % (ref 35–47)
HGB BLD-MCNC: 9.8 G/DL (ref 11.7–15.7)
INTERPRETATION ECG - MUSE: NORMAL
INTERPRETATION ECG - MUSE: NORMAL
MAGNESIUM SERPL-MCNC: 1.8 MG/DL (ref 1.6–2.3)
MCH RBC QN AUTO: 35.8 PG (ref 26.5–33)
MCHC RBC AUTO-ENTMCNC: 33.2 G/DL (ref 31.5–36.5)
MCV RBC AUTO: 108 FL (ref 78–100)
PLATELET # BLD AUTO: 79 10E9/L (ref 150–450)
POTASSIUM SERPL-SCNC: 4.3 MMOL/L (ref 3.4–5.3)
RBC # BLD AUTO: 2.74 10E12/L (ref 3.8–5.2)
SODIUM SERPL-SCNC: 134 MMOL/L (ref 133–144)
WBC # BLD AUTO: 4.4 10E9/L (ref 4–11)

## 2017-05-23 PROCEDURE — 25000131 ZZH RX MED GY IP 250 OP 636 PS 637: Performed by: STUDENT IN AN ORGANIZED HEALTH CARE EDUCATION/TRAINING PROGRAM

## 2017-05-23 PROCEDURE — 25000132 ZZH RX MED GY IP 250 OP 250 PS 637: Performed by: STUDENT IN AN ORGANIZED HEALTH CARE EDUCATION/TRAINING PROGRAM

## 2017-05-23 PROCEDURE — 99238 HOSP IP/OBS DSCHRG MGMT 30/<: CPT | Mod: GC | Performed by: INTERNAL MEDICINE

## 2017-05-23 PROCEDURE — 36415 COLL VENOUS BLD VENIPUNCTURE: CPT | Performed by: INTERNAL MEDICINE

## 2017-05-23 PROCEDURE — 80048 BASIC METABOLIC PNL TOTAL CA: CPT | Performed by: INTERNAL MEDICINE

## 2017-05-23 PROCEDURE — 25000125 ZZHC RX 250: Performed by: STUDENT IN AN ORGANIZED HEALTH CARE EDUCATION/TRAINING PROGRAM

## 2017-05-23 PROCEDURE — 97161 PT EVAL LOW COMPLEX 20 MIN: CPT | Mod: GP

## 2017-05-23 PROCEDURE — 40000193 ZZH STATISTIC PT WARD VISIT

## 2017-05-23 PROCEDURE — 25000132 ZZH RX MED GY IP 250 OP 250 PS 637: Performed by: INTERNAL MEDICINE

## 2017-05-23 PROCEDURE — 97530 THERAPEUTIC ACTIVITIES: CPT | Mod: GP

## 2017-05-23 PROCEDURE — 71020 XR CHEST 2 VW: CPT

## 2017-05-23 PROCEDURE — 85027 COMPLETE CBC AUTOMATED: CPT | Performed by: INTERNAL MEDICINE

## 2017-05-23 PROCEDURE — 83735 ASSAY OF MAGNESIUM: CPT | Performed by: INTERNAL MEDICINE

## 2017-05-23 RX ORDER — AMIODARONE HYDROCHLORIDE 400 MG/1
400 TABLET ORAL 2 TIMES DAILY
Qty: 24 TABLET | Refills: 0 | Status: ON HOLD | OUTPATIENT
Start: 2017-05-21 | End: 2017-06-16

## 2017-05-23 RX ORDER — NITROFURANTOIN 25; 75 MG/1; MG/1
100 CAPSULE ORAL EVERY 12 HOURS
Qty: 16 CAPSULE | Refills: 0 | Status: ON HOLD | OUTPATIENT
Start: 2017-05-23 | End: 2017-06-16

## 2017-05-23 RX ORDER — NITROFURANTOIN 25; 75 MG/1; MG/1
100 CAPSULE ORAL EVERY 12 HOURS SCHEDULED
Status: DISCONTINUED | OUTPATIENT
Start: 2017-05-23 | End: 2017-05-23 | Stop reason: HOSPADM

## 2017-05-23 RX ORDER — AMIODARONE HYDROCHLORIDE 200 MG/1
200 TABLET ORAL DAILY
Qty: 30 TABLET | Refills: 3 | Status: ON HOLD | OUTPATIENT
Start: 2017-06-04 | End: 2017-06-16

## 2017-05-23 RX ORDER — METOPROLOL SUCCINATE 100 MG/1
100 TABLET, EXTENDED RELEASE ORAL DAILY
Qty: 30 TABLET | Refills: 3 | Status: ON HOLD | OUTPATIENT
Start: 2017-05-23 | End: 2017-06-16

## 2017-05-23 RX ADMIN — HYDROXYCHLOROQUINE SULFATE 200 MG: 200 TABLET, FILM COATED ENTERAL at 07:49

## 2017-05-23 RX ADMIN — GABAPENTIN 200 MG: 100 CAPSULE ORAL at 07:47

## 2017-05-23 RX ADMIN — Medication 2.5 ML: at 08:01

## 2017-05-23 RX ADMIN — DILTIAZEM HYDROCHLORIDE 120 MG: 120 CAPSULE, EXTENDED RELEASE ORAL at 07:47

## 2017-05-23 RX ADMIN — NITROFURANTOIN (MONOHYDRATE/MACROCRYSTALS) 100 MG: 75; 25 CAPSULE ORAL at 12:45

## 2017-05-23 RX ADMIN — GABAPENTIN 200 MG: 100 CAPSULE ORAL at 14:11

## 2017-05-23 RX ADMIN — Medication 3 MG: at 07:50

## 2017-05-23 RX ADMIN — METHOTREXATE 10 MG: 2.5 TABLET ORAL at 07:49

## 2017-05-23 RX ADMIN — METOPROLOL SUCCINATE 100 MG: 100 TABLET, EXTENDED RELEASE ORAL at 07:49

## 2017-05-23 RX ADMIN — LEFLUNOMIDE 20 MG: 20 TABLET ORAL at 07:49

## 2017-05-23 RX ADMIN — APIXABAN 5 MG: 5 TABLET, FILM COATED ORAL at 07:50

## 2017-05-23 RX ADMIN — Medication 2 G: at 12:49

## 2017-05-23 RX ADMIN — AMIODARONE HYDROCHLORIDE 400 MG: 200 TABLET ORAL at 07:50

## 2017-05-23 ASSESSMENT — PAIN DESCRIPTION - DESCRIPTORS: DESCRIPTORS: ACHING

## 2017-05-23 NOTE — PROGRESS NOTES
CLINICAL NUTRITION SERVICES - ASSESSMENT NOTE     Nutrition Prescription    RECOMMENDATIONS FOR MDs/PROVIDERS TO ORDER:  None at this time    Recommendations already ordered by Registered Dietitian (RD):  Oral supplement    Future/Additional Recommendations:  1. Modify diet to a 2 g vs 3 g sodium diet, pending oral intake. Rec pt self-select foods she tolerates well due to nausea.   2. Consider scheduling antiemetics/antinauseants ~20 minutes prior to meals to help optimize nausea control.    3. Rec resume home calcium/vitamin D supplementation BID once oral intake improves (pt on prednisone).  4. Check vitamin D status and order additional supplementation if vitamin D lab is low.   5. Consider checking folic acid and vitamin B12.     REASON FOR ASSESSMENT  Amelia Michel is a/an 56 year old female assessed by the dietitian for LOS    NUTRITION HISTORY  Per discussion with pt, decreased appetite since admission on 5/15/17. Prior to admission, her appetite was excellent. Has diarrhea chronically. States she follows a low-sodium diet at home.   Per H & P, nausea at time of admission. Chart indicates pt was ordered to take calcium/vitamin D BID, coenzyme Q-10, folic acid, and multivitamin with minerals PTA.     CURRENT NUTRITION ORDERS  Diet: Low Saturated Fat/2400 mg Sodium  Intake/Tolerance: No appetite recently. Eating only bites over the past two to three days per RN today. Flowsheets indicate pt consuming 100% of meals with a good appetite 5/17, 5/19, and 5/20, % of meals with a good appetite 5/21, and 25-50% of meals with a poor to fair appetite 5/22.  Per chart, chronic diarrhea (on scheduled lomotil). Pt states her appetite is poor and she has been eating less due to nausea, lethargy, and nothing tastes good. Admits she has some fullness. Medications may be contributing to sx. Per pt, foods she best tolerates are cottage cheese and foods on the full liquid diet.     LABS  Labs  "reviewed    MEDICATIONS  Medications reviewed    ANTHROPOMETRICS  Height: 147.3 cm (4' 10\")  Most Recent Weight: 64.4 kg (141 lb 15.6 oz)    IBW: 43.2 kg  BMI: Overweight BMI 25-29.9  Weight History: 68.9 kg (4/27/16), 60.5 kg (7/13/16), 61.6 kg (4/4/17) - Wt variable as per discussion with pt. Per pt report, wt variation may be due, in part, to lymphedema. UBW of ~129-136# (~58.6-61.8 kg).   Dosing Weight: 48 kg (adjusted, based on lowest wt this admission of 62.1 kg on 5/16)    ASSESSED NUTRITION NEEDS  Estimated Energy Needs: 5862-1639 kcals/day (25 - 30 kcals/kg)  Justification: Maintenance needs  Estimated Protein Needs: 48-58 grams protein/day (1 - 1.2 grams of pro/kg)  Justification: Increased needs with stress factors  Estimated Fluid Needs: 1968-8613 mL/day (25 - 30 mL/kg)   Justification: Maintenance needs or per team, pending fluid status    PHYSICAL FINDINGS  See malnutrition section below.    MALNUTRITION  % Intake: < 75% for > 7 days (non-severe)  % Weight Loss: Wt variable but pt remains above usual body wt.   Subcutaneous Fat Loss: None observed, but limited observation as pt nauseous.  Muscle Loss: None observed, but limited observation as pt nauseous.  Fluid Accumulation/Edema: Does not meet criteria  Malnutrition Diagnosis: Patient does not meet two of the above criteria necessary for diagnosing malnutrition but is at risk for malnutrition    NUTRITION DIAGNOSIS  Inadequate oral intake related to decreased appetite with nausea, lethargy, and food not tasting good as evidenced by pt consuming bites of foods over the past two to three days.      INTERVENTIONS  Implementation  1. Nutrition education for nutrition relationship to health/disease (pt and ): Discussed nutrition recommendations with nausea sx. Reviewed aspects of Coping with Nausea and Vomiting handout and provided written handout. Encouraged pt to consume well-tolerated foods and beverages.  2. Medical food supplement therapy: Pt " tried the apple Ensure Clear and did not mind the supplement. She may try the berry Ensure Clear supplement or possibly Magic Cup. Provided her with an oral supplement menu. Placed prn snack/supplement order. She may request these prn.     Goals  Patient to consume % of nutritionally adequate meal trays TID, or the equivalent with supplements/snacks.     Monitoring/Evaluation  Progress toward goals will be monitored and evaluated per protocol.     Nutrition will continue to follow.    Inés Brown, MS, RD, LD, [facility]   6C Pgr:  [phone]

## 2017-05-23 NOTE — PLAN OF CARE
Problem: Goal Outcome Summary  Goal: Goal Outcome Summary     D: Patient admitted afib w/RVR, failed CV last week. Self converted 0330 5/22. She has been in SR this shift, 0-3 PAC, 0-3 PVCs. Cardiac MRI done today, primary team to discuss results with patient tomorrow. New results show ESBL E.Coli in urine. Patient and S.O. concern is with the continued SOB with minimal exertion (does not desaturate) that she expected to resolve upon conversion.  I: Relayed new contact iso results with cross cover. Ambulated with patient in [place].Notified cross cover about SOB concerns. PRN zofran given for nausea s/p evening meds.  A: SR, room air, up independently. Chronic diarrhea with scheduled lomotil. Ertapenem IV ordered and given this evening.  P: Continue to monitor rhythm. Address SOB/MONTALVO with primary team.

## 2017-05-23 NOTE — PLAN OF CARE
Problem: Goal Outcome Summary  Goal: Goal Outcome Summary  PT 6C: PT Eval and 1x treatment completed. Pt presents with general deconditioning following 8 day hospitalization. Pt ambulated 100' x2 with SBA, increased SOB and mild unsteadiness during activity. Pt will not be returning to work initially upon discharge and  works full-time, but plans to stay home with wife first day or two, until pt feels more comfortable. Recommended pt complete short bouts of activity/walking multiple times a day in order to increase activity tolerance, gradually increasing duration and frequency of exercise. Pt has 2 steps to enter house and 4WW at home that she can use if needed, no other living environment concerns. VSS throughout (HR 50s-60s, SpO2 >95% on RA).       REC: Home with Assist and Outpatient PT for increased strength, balance and activity tolerance.    Physical Therapy Discharge Summary    Reason for therapy discharge:    Discharge to Home with OP Physical Therapy    Progress towards therapy goal(s). See goals on Care Plan in [facility] electronic health record for goal details.  Goals partially met, discharge on same day as eval.    Therapy recommendation(s):    Outpatient PT for increased strength, balance, and activity tolerance to maximize safety and independence with functional mobility.

## 2017-05-23 NOTE — PROGRESS NOTES
05/23/17 1400   Quick Adds   Type of Visit Initial PT Evaluation   Living Environment   Lives With spouse   Living Arrangements house   Number of Stairs to Enter Home 2   Number of Stairs Within Home 0   Transportation Available car;family or friend will provide   Living Environment Comment Pt lives with her  in 1 story home. Patient works, but will not be returning to work initially,  works full-time but plans on staying home with pt next day or two to make sure patient is feeling comfortable.   Self-Care   Usual Activity Tolerance good   Current Activity Tolerance fair   Equipment Currently Used at Home none  (owns 4WW, w/c)   Functional Level Prior   Ambulation 0-->independent   Transferring 0-->independent   Toileting 0-->independent   Bathing 0-->independent   Dressing 0-->independent   Eating 0-->independent   Communication 0-->understands/communicates without difficulty   Swallowing 0-->swallows foods/liquids without difficulty   Cognition 0 - no cognition issues reported   Fall history within last six months no   Which of the above functional risks had a recent onset or change? ambulation   Prior Functional Level Comment Lymphedema wraps 2-3x/day, compression socks during the day   General Information   Onset of Illness/Injury or Date of Surgery - Date 05/15/17   Referring Physician Verna Caba MD   Patient/Family Goals Statement to return home   Pertinent History of Current Problem (include personal factors and/or comorbidities that impact the POC) Amelia Michel is a 55yo F w/ RA, VSD s/p repair 1969, hypertension, and insomnia that presents with multiple atrial arrhythmias with RVR. ( SVT, Afib, A-flutter).    Precautions/Limitations no known precautions/limitations   Heart Disease Risk Factors High blood pressure;Lack of physical activity;Medical history   General Observations Pt agreeable to PT,  present during eval, involved and supportive. RN ok'd session.    General Info Comments Activity: ambulate every shift as tolerated   Cognitive Status Examination   Orientation orientation to person, place and time   Level of Consciousness alert   Follows Commands and Answers Questions 100% of the time   Personal Safety and Judgment intact   Memory intact   Pain Assessment   Patient Currently in Pain No   Integumentary/Edema   Integumentary/Edema Comments PMH includes lymphedema wraps 2-3x/week at night, wears compression socks during the day   Range of Motion (ROM)   ROM Comment WFL   Strength   Strength Comments WFL   Bed Mobility   Bed Mobility Comments IND   Transfer Skills   Transfer Comments IND   Gait   Gait Comments Ambulates with SBA, mild unsteadiness but no overt LOB   Balance   Balance Comments Sitting: excellent, Standing: good   Sensory Examination   Sensory Perception Comments Pt reports numbness in R heel 2/2 neuroapthy   General Therapy Interventions   Planned Therapy Interventions gait training;progressive activity/exercise   Clinical Impression   Criteria for Skilled Therapeutic Intervention yes, treatment indicated   PT Diagnosis deconditioning following prolonged hospitilzation   Influenced by the following impairments decreased activity tolerance, MONTALVO, mild balance impairments   Functional limitations due to impairments functional endurance for gait, stairs, and ADLs   Clinical Presentation Stable/Uncomplicated   Clinical Presentation Rationale Pt presents with signficant deconditioning in the setting of cardiac PMH impacting abitlity to return to prior level of function safely and independently.   Clinical Decision Making (Complexity) Low complexity   Therapy Frequency` (1x eval and treat)   Predicted Duration of Therapy Intervention (days/wks) 1x eval and treat   Anticipated Discharge Disposition Home with Outpatient Therapy   Risk & Benefits of therapy have been explained Yes   Patient, Family & other staff in agreement with plan of care Yes   [employer]  "[facility] TM \"6 Clicks\"   2016, Trustees of [facility], under license to [employer].  All rights reserved.   6 Clicks Short Forms Basic Mobility Inpatient Short Form   [facility]  \"6 Clicks\" V.2 Basic Mobility Inpatient Short Form   1. Turning from your back to your side while in a flat bed without using bedrails? 4 - None   2. Moving from lying on your back to sitting on the side of a flat bed without using bedrails? 4 - None   3. Moving to and from a bed to a chair (including a wheelchair)? 4 - None   4. Standing up from a chair using your arms (e.g., wheelchair, or bedside chair)? 4 - None   5. To walk in hospital room? 4 - None   6. Climbing 3-5 steps with a railing? 4 - None   Basic Mobility Raw Score (Score out of 24.Lower scores equate to lower levels of function) 24   Total Evaluation Time   Total Evaluation Time (Minutes) 8     "

## 2017-05-23 NOTE — PLAN OF CARE
Plan for ablation procedure cancelled today & instead went for cardiac MRI this afternoon. Started on po macrobid abx for +UTI. Reports feeling very fatigued despite heart rate and rhythm being stable since 0330. Reports she feels her baseline neuropathy-especially in her right heel-has worsened since starting amiodarone. Appetite poor when allowed to eat. Continues to have diarrhea but less frequent per pt. Plan to monitor rhythm overnight; with possible DC home tomorrow.

## 2017-05-23 NOTE — PLAN OF CARE
Problem: Goal Outcome Summary  Goal: Goal Outcome Summary  Monitor SR w/ 0-10 PACs, r/PVC,  6bts Afib. VSS. C/o some stomach upset but declines zofran. Had 1 small, soft BM. No issues overnight. Continue to monitor for Afib. Notify MD with any issues or complaints.

## 2017-05-23 NOTE — PLAN OF CARE
Problem: Goal Outcome Summary  Goal: Goal Outcome Summary  Discharge  D: Patient discharged to home this afternoon.  I: Tele and PIV removed. Discussed discharge paperwork with patient.  A: Patient and  express understanding of paperwork including new medications, upcoming appointments.  P: Meds to be picked up in [place] pharmacy.  to transport patient home.

## 2017-05-24 ENCOUNTER — CARE COORDINATION (OUTPATIENT)
Dept: CARE COORDINATION | Facility: CLINIC | Age: 57
End: 2017-05-24

## 2017-05-24 LAB
BACTERIA SPEC CULT: ABNORMAL
Lab: ABNORMAL
MICRO REPORT STATUS: ABNORMAL
MICROORGANISM SPEC CULT: ABNORMAL
SPECIMEN SOURCE: ABNORMAL

## 2017-05-24 NOTE — PROGRESS NOTES
"[facility]  \"Hello, my name is La Nena Pablo , and I am calling from the [facility].  I want to check in and see how you are doing, after leaving the hospital.  You may also receive a call from your Care Coordinator (care team), but I want to make sure you don t have any urgent needs.  I have a couple questions to review with you:     Post-Discharge Outreach                                                    Amelia Michel is a 56 year old female     Follow-up Appointments           Adult [facility] Follow-up and recommended labs and tests         Follow up with primary care provider, Comfort Sabillon, within 7 days to evaluate medication change, for hospital follow- up and regarding new diagnosis.  Follow up CXR in 1 month regarding right pleural effusion resolution.     Follow up with EP in clinic with Dr. Eaton in 2 weeks regarding ablation for new diagnosis atrial fibrillation/flutter with rvr, svt.   *Patient has been scheduled with Dr. Eaton (per the Cardiology Clinic)for his next available appointment on Monday, 7/5 at 8:00a. I have sent a message to see if she can be seen sooner with him*      Appointments on [facility] and/or [facility] (with [facility] or [facility] provider or service). Call [phone] if you haven't heard regarding these appointments within 7 days of discharge.                        Your next 10 appointments already scheduled            Jul 05, 2017  8:00 AM CDT   (Arrive by 7:45 AM)   RETURN ATRIAL FIBULATION VISIT with Juan Eaton MD   [facility]               Care Team:    Patient Care Team       Relationship Specialty Notifications Start End    Comfort Sabillon MD PCP - General Family Practice  2/10/11     Phone: [phone] Fax: [phone]         [facility] [address] [facility] [address]    Sandy Michele MD MD Cardiology  5/19/17     Phone: [phone] Fax: [phone]    "       PHYSICI[facility] [address] [facility] [address] [facility] [address]            Transition of Care Review                                                      Did you have a surgery or procedure during your hospital visit? No   If yes, do you have any of the following:     Signs of infection:  No    Pain:  No     Pain Scale (0-10) 0/10     Location: NA    Wound/incision concerns? NA    Do you have all of your medications/refills?  Yes    Are you having any side effects or questions about your medication(s)? No    Do you have any new or worsening symptoms?  No    Do you have any future appointments scheduled?   Yes             Plan                                                      Thanks for your time.  Your Care Coordinator may follow-up within the next couple days.  In the meantime if you have questions, concerns or problems call your care team.        La Nena Pablo

## 2017-05-25 ENCOUNTER — TELEPHONE (OUTPATIENT)
Dept: PHARMACY | Facility: OTHER | Age: 57
End: 2017-05-25

## 2017-05-25 NOTE — TELEPHONE ENCOUNTER
MTM referral from: Transitions of Care (recent hospital discharge or ED visit)    MTM referral outreach attempt #1 on May 25, 2017 at 12:16 PM      Outcome: Left Message    Beth Hurtado MTM Coordinator

## 2017-05-26 NOTE — TELEPHONE ENCOUNTER
MTM referral from: Transitions of Care (recent hospital discharge or ED visit)    MTM referral outreach attempt #2 on May 26, 2017 at 2:28 PM      Outcome: Patient not reachable after several attempts, will route to MTM Pharmacist/Provider as an FYI. Thank you for the referral.    Beth Hurtado MTM Coordinator

## 2017-05-27 LAB
BACTERIA SPEC CULT: NO GROWTH
BACTERIA SPEC CULT: NO GROWTH
MICRO REPORT STATUS: NORMAL
MICRO REPORT STATUS: NORMAL
SPECIMEN SOURCE: NORMAL
SPECIMEN SOURCE: NORMAL

## 2017-05-29 ENCOUNTER — APPOINTMENT (OUTPATIENT)
Dept: GENERAL RADIOLOGY | Facility: CLINIC | Age: 57
DRG: 260 | End: 2017-05-29
Attending: INTERNAL MEDICINE
Payer: COMMERCIAL

## 2017-05-29 ENCOUNTER — APPOINTMENT (OUTPATIENT)
Dept: CT IMAGING | Facility: CLINIC | Age: 57
DRG: 260 | End: 2017-05-29
Attending: INTERNAL MEDICINE
Payer: COMMERCIAL

## 2017-05-29 ENCOUNTER — APPOINTMENT (OUTPATIENT)
Dept: CARDIOLOGY | Facility: CLINIC | Age: 57
DRG: 260 | End: 2017-05-29
Attending: INTERNAL MEDICINE
Payer: COMMERCIAL

## 2017-05-29 ENCOUNTER — APPOINTMENT (OUTPATIENT)
Dept: ULTRASOUND IMAGING | Facility: CLINIC | Age: 57
DRG: 260 | End: 2017-05-29
Attending: INTERNAL MEDICINE
Payer: COMMERCIAL

## 2017-05-29 ENCOUNTER — ALLIED HEALTH/NURSE VISIT (OUTPATIENT)
Dept: NEUROLOGY | Facility: CLINIC | Age: 57
DRG: 260 | End: 2017-05-29
Attending: PSYCHIATRY & NEUROLOGY
Payer: COMMERCIAL

## 2017-05-29 ENCOUNTER — APPOINTMENT (OUTPATIENT)
Dept: ULTRASOUND IMAGING | Facility: CLINIC | Age: 57
DRG: 260 | End: 2017-05-29
Attending: STUDENT IN AN ORGANIZED HEALTH CARE EDUCATION/TRAINING PROGRAM
Payer: COMMERCIAL

## 2017-05-29 ENCOUNTER — HOSPITAL ENCOUNTER (INPATIENT)
Facility: CLINIC | Age: 57
LOS: 18 days | Discharge: NURSING, REHAB OR LONG-TERM CARE | DRG: 260 | End: 2017-06-16
Attending: INTERNAL MEDICINE | Admitting: INTERNAL MEDICINE
Payer: COMMERCIAL

## 2017-05-29 DIAGNOSIS — R57.0 CARDIOGENIC SHOCK (H): ICD-10-CM

## 2017-05-29 DIAGNOSIS — I48.0 PAROXYSMAL ATRIAL FIBRILLATION (H): ICD-10-CM

## 2017-05-29 DIAGNOSIS — G47.00 INSOMNIA, UNSPECIFIED TYPE: ICD-10-CM

## 2017-05-29 DIAGNOSIS — M05.79 RHEUMATOID ARTHRITIS INVOLVING MULTIPLE SITES WITH POSITIVE RHEUMATOID FACTOR (H): ICD-10-CM

## 2017-05-29 DIAGNOSIS — R21 RASH AND NONSPECIFIC SKIN ERUPTION: ICD-10-CM

## 2017-05-29 DIAGNOSIS — I47.19 ATRIAL TACHYCARDIA (H): ICD-10-CM

## 2017-05-29 DIAGNOSIS — I46.9 CARDIAC ARREST WITH VENTRICULAR FIBRILLATION (H): ICD-10-CM

## 2017-05-29 DIAGNOSIS — I49.01 CARDIAC ARREST WITH VENTRICULAR FIBRILLATION (H): ICD-10-CM

## 2017-05-29 DIAGNOSIS — I15.8 OTHER SECONDARY HYPERTENSION: ICD-10-CM

## 2017-05-29 DIAGNOSIS — R57.0 CARDIOGENIC SHOCK (H): Primary | ICD-10-CM

## 2017-05-29 DIAGNOSIS — K59.00 CONSTIPATION, UNSPECIFIED CONSTIPATION TYPE: ICD-10-CM

## 2017-05-29 DIAGNOSIS — I46.9 CARDIAC ARREST (H): Primary | ICD-10-CM

## 2017-05-29 LAB
ALBUMIN SERPL-MCNC: 1.8 G/DL (ref 3.4–5)
ALBUMIN SERPL-MCNC: 1.9 G/DL (ref 3.4–5)
ALBUMIN SERPL-MCNC: 1.9 G/DL (ref 3.4–5)
ALBUMIN UR-MCNC: 300 MG/DL
ALP SERPL-CCNC: 143 U/L (ref 40–150)
ALP SERPL-CCNC: 151 U/L (ref 40–150)
ALP SERPL-CCNC: 95 U/L (ref 40–150)
ALT SERPL W P-5'-P-CCNC: 48 U/L (ref 0–50)
ALT SERPL W P-5'-P-CCNC: 732 U/L (ref 0–50)
ALT SERPL W P-5'-P-CCNC: 922 U/L (ref 0–50)
ANION GAP SERPL CALCULATED.3IONS-SCNC: 11 MMOL/L (ref 3–14)
ANION GAP SERPL CALCULATED.3IONS-SCNC: 15 MMOL/L (ref 3–14)
ANION GAP SERPL CALCULATED.3IONS-SCNC: 16 MMOL/L (ref 3–14)
ANION GAP SERPL CALCULATED.3IONS-SCNC: 20 MMOL/L (ref 3–14)
ANISOCYTOSIS BLD QL SMEAR: SLIGHT
APPEARANCE UR: ABNORMAL
APTT PPP: 33 SEC (ref 22–37)
APTT PPP: 38 SEC (ref 22–37)
AST SERPL W P-5'-P-CCNC: 1186 U/L (ref 0–45)
AST SERPL W P-5'-P-CCNC: 983 U/L (ref 0–45)
AST SERPL W P-5'-P-CCNC: ABNORMAL U/L (ref 0–45)
BASE DEFICIT BLDA-SCNC: 3.8 MMOL/L
BASE DEFICIT BLDA-SCNC: 5.3 MMOL/L
BASE DEFICIT BLDA-SCNC: 5.6 MMOL/L
BASE DEFICIT BLDA-SCNC: 5.6 MMOL/L
BASE DEFICIT BLDA-SCNC: 7 MMOL/L
BASE DEFICIT BLDA-SCNC: 7.3 MMOL/L
BASE DEFICIT BLDA-SCNC: 7.9 MMOL/L
BASE DEFICIT BLDA-SCNC: 9.1 MMOL/L
BASE DEFICIT BLDV-SCNC: 4.5 MMOL/L
BASE DEFICIT BLDV-SCNC: 7.4 MMOL/L
BASOPHILS # BLD AUTO: 0 10E9/L (ref 0–0.2)
BASOPHILS # BLD AUTO: 0 10E9/L (ref 0–0.2)
BASOPHILS NFR BLD AUTO: 0.3 %
BASOPHILS NFR BLD AUTO: 0.9 %
BILIRUB SERPL-MCNC: 0.6 MG/DL (ref 0.2–1.3)
BILIRUB SERPL-MCNC: 1.3 MG/DL (ref 0.2–1.3)
BILIRUB SERPL-MCNC: 1.7 MG/DL (ref 0.2–1.3)
BILIRUB UR QL STRIP: NEGATIVE
BUN SERPL-MCNC: 16 MG/DL (ref 7–30)
BUN SERPL-MCNC: 20 MG/DL (ref 7–30)
BUN SERPL-MCNC: 26 MG/DL (ref 7–30)
BUN SERPL-MCNC: 31 MG/DL (ref 7–30)
BURR CELLS BLD QL SMEAR: SLIGHT
CA-I BLD-MCNC: 3.8 MG/DL (ref 4.4–5.2)
CA-I BLD-MCNC: 3.9 MG/DL (ref 4.4–5.2)
CA-I BLD-MCNC: 4.7 MG/DL (ref 4.4–5.2)
CA-I BLD-SCNC: 3.6 MG/DL (ref 4.4–5.2)
CALCIUM SERPL-MCNC: 6.5 MG/DL (ref 8.5–10.1)
CALCIUM SERPL-MCNC: 6.9 MG/DL (ref 8.5–10.1)
CALCIUM SERPL-MCNC: 7.1 MG/DL (ref 8.5–10.1)
CALCIUM SERPL-MCNC: 8.6 MG/DL (ref 8.5–10.1)
CHLORIDE SERPL-SCNC: 103 MMOL/L (ref 94–109)
CHLORIDE SERPL-SCNC: 105 MMOL/L (ref 94–109)
CHLORIDE SERPL-SCNC: 105 MMOL/L (ref 94–109)
CHLORIDE SERPL-SCNC: 106 MMOL/L (ref 94–109)
CO2 BLD-SCNC: 12 MMOL/L (ref 21–28)
CO2 BLD-SCNC: 13 MMOL/L (ref 21–28)
CO2 BLD-SCNC: 14 MMOL/L (ref 21–28)
CO2 BLDCOV-SCNC: 18 MMOL/L (ref 21–28)
CO2 BLDCOV-SCNC: 18 MMOL/L (ref 21–28)
CO2 SERPL-SCNC: 16 MMOL/L (ref 20–32)
CO2 SERPL-SCNC: 17 MMOL/L (ref 20–32)
CO2 SERPL-SCNC: 18 MMOL/L (ref 20–32)
CO2 SERPL-SCNC: 21 MMOL/L (ref 20–32)
COLOR UR AUTO: YELLOW
CREAT SERPL-MCNC: 1.03 MG/DL (ref 0.52–1.04)
CREAT SERPL-MCNC: 1.24 MG/DL (ref 0.52–1.04)
CREAT SERPL-MCNC: 1.48 MG/DL (ref 0.52–1.04)
CREAT SERPL-MCNC: 1.7 MG/DL (ref 0.52–1.04)
CRP SERPL-MCNC: 58 MG/L (ref 0–8)
D DIMER PPP FEU-MCNC: 17.8 UG/ML FEU (ref 0–0.5)
DIFFERENTIAL METHOD BLD: ABNORMAL
DIFFERENTIAL METHOD BLD: ABNORMAL
EOSINOPHIL # BLD AUTO: 0 10E9/L (ref 0–0.7)
EOSINOPHIL # BLD AUTO: 0 10E9/L (ref 0–0.7)
EOSINOPHIL NFR BLD AUTO: 0 %
EOSINOPHIL NFR BLD AUTO: 0 %
ERYTHROCYTE [DISTWIDTH] IN BLOOD BY AUTOMATED COUNT: 17.8 % (ref 10–15)
ERYTHROCYTE [DISTWIDTH] IN BLOOD BY AUTOMATED COUNT: 18 % (ref 10–15)
ERYTHROCYTE [DISTWIDTH] IN BLOOD BY AUTOMATED COUNT: 18.1 % (ref 10–15)
ERYTHROCYTE [SEDIMENTATION RATE] IN BLOOD BY WESTERGREN METHOD: 22 MM/H (ref 0–30)
FIBRINOGEN PPP-MCNC: 401 MG/DL (ref 200–420)
GFR SERPL CREATININE-BSD FRML MDRD: 31 ML/MIN/1.7M2
GFR SERPL CREATININE-BSD FRML MDRD: 36 ML/MIN/1.7M2
GFR SERPL CREATININE-BSD FRML MDRD: 45 ML/MIN/1.7M2
GFR SERPL CREATININE-BSD FRML MDRD: 55 ML/MIN/1.7M2
GLUCOSE BLD-MCNC: 192 MG/DL (ref 70–99)
GLUCOSE BLD-MCNC: 200 MG/DL (ref 70–99)
GLUCOSE BLD-MCNC: 83 MG/DL (ref 70–99)
GLUCOSE BLDC GLUCOMTR-MCNC: 127 MG/DL (ref 70–99)
GLUCOSE BLDC GLUCOMTR-MCNC: 141 MG/DL (ref 70–99)
GLUCOSE BLDC GLUCOMTR-MCNC: 168 MG/DL (ref 70–99)
GLUCOSE BLDC GLUCOMTR-MCNC: 173 MG/DL (ref 70–99)
GLUCOSE BLDC GLUCOMTR-MCNC: 185 MG/DL (ref 70–99)
GLUCOSE BLDC GLUCOMTR-MCNC: 191 MG/DL (ref 70–99)
GLUCOSE BLDC GLUCOMTR-MCNC: 219 MG/DL (ref 70–99)
GLUCOSE BLDC GLUCOMTR-MCNC: 68 MG/DL (ref 70–99)
GLUCOSE BLDC GLUCOMTR-MCNC: 78 MG/DL (ref 70–99)
GLUCOSE BLDC GLUCOMTR-MCNC: 83 MG/DL (ref 70–99)
GLUCOSE SERPL-MCNC: 155 MG/DL (ref 70–99)
GLUCOSE SERPL-MCNC: 191 MG/DL (ref 70–99)
GLUCOSE SERPL-MCNC: 198 MG/DL (ref 70–99)
GLUCOSE SERPL-MCNC: 77 MG/DL (ref 70–99)
GLUCOSE UR STRIP-MCNC: 150 MG/DL
GRAM STN SPEC: NORMAL
GRAN CASTS #/AREA URNS LPF: 43 /LPF
HCO3 BLD-SCNC: 16 MMOL/L (ref 21–28)
HCO3 BLD-SCNC: 18 MMOL/L (ref 21–28)
HCO3 BLD-SCNC: 19 MMOL/L (ref 21–28)
HCO3 BLD-SCNC: 19 MMOL/L (ref 21–28)
HCO3 BLD-SCNC: 20 MMOL/L (ref 21–28)
HCO3 BLD-SCNC: 21 MMOL/L (ref 21–28)
HCO3 BLDV-SCNC: 19 MMOL/L (ref 21–28)
HCO3 BLDV-SCNC: 21 MMOL/L (ref 21–28)
HCT VFR BLD AUTO: 29.7 % (ref 35–47)
HCT VFR BLD AUTO: 35.2 % (ref 35–47)
HCT VFR BLD AUTO: 35.3 % (ref 35–47)
HCT VFR BLD CALC: 28 %PCV (ref 35–47)
HGB BLD CALC-MCNC: 9.5 G/DL (ref 11.7–15.7)
HGB BLD-MCNC: 11.5 G/DL (ref 11.7–15.7)
HGB BLD-MCNC: 11.8 G/DL (ref 11.7–15.7)
HGB BLD-MCNC: 9.4 G/DL (ref 11.7–15.7)
HGB FREE PLAS-MCNC: 70 MG/DL
HGB UR QL STRIP: ABNORMAL
IMM GRANULOCYTES # BLD: 0.1 10E9/L (ref 0–0.4)
IMM GRANULOCYTES NFR BLD: 0.8 %
INR BLD: 2.6 (ref 0.86–1.14)
INR PPP: 2.31 (ref 0.86–1.14)
INR PPP: 3.08 (ref 0.86–1.14)
INR PPP: 3.93 (ref 0.86–1.14)
INR PPP: 4.47 (ref 0.86–1.14)
KCT BLD-ACNC: 189 SEC (ref 105–167)
KETONES UR STRIP-MCNC: 5 MG/DL
LACTATE BLD-SCNC: 10.2 MMOL/L (ref 0.7–2.1)
LACTATE BLD-SCNC: 10.8 MMOL/L (ref 0.7–2.1)
LACTATE BLD-SCNC: 11 MMOL/L (ref 0.7–2.1)
LACTATE BLD-SCNC: 11.8 MMOL/L (ref 0.7–2.1)
LACTATE BLD-SCNC: 12.7 MMOL/L (ref 0.7–2.1)
LACTATE BLD-SCNC: 13.4 MMOL/L (ref 0.7–2.1)
LACTATE BLD-SCNC: 6.1 MMOL/L (ref 0.7–2.1)
LACTATE BLD-SCNC: 6.8 MMOL/L (ref 0.7–2.1)
LACTATE BLD-SCNC: 7.9 MMOL/L (ref 0.7–2.1)
LACTATE BLD-SCNC: 8.3 MMOL/L (ref 0.7–2.1)
LACTATE BLD-SCNC: 8.3 MMOL/L (ref 0.7–2.1)
LACTATE BLD-SCNC: 9.5 MMOL/L (ref 0.7–2.1)
LACTATE BLD-SCNC: 9.6 MMOL/L (ref 0.7–2.1)
LDH SERPL L TO P-CCNC: 1655 U/L (ref 81–234)
LEUKOCYTE ESTERASE UR QL STRIP: NEGATIVE
LYMPHOCYTES # BLD AUTO: 0.4 10E9/L (ref 0.8–5.3)
LYMPHOCYTES # BLD AUTO: 1 10E9/L (ref 0.8–5.3)
LYMPHOCYTES NFR BLD AUTO: 7.4 %
LYMPHOCYTES NFR BLD AUTO: 9.4 %
MACROCYTES BLD QL SMEAR: PRESENT
MAGNESIUM SERPL-MCNC: 1.8 MG/DL (ref 1.6–2.3)
MAGNESIUM SERPL-MCNC: 2.4 MG/DL (ref 1.6–2.3)
MAGNESIUM SERPL-MCNC: 2.4 MG/DL (ref 1.6–2.3)
MCH RBC QN AUTO: 35.1 PG (ref 26.5–33)
MCH RBC QN AUTO: 35.8 PG (ref 26.5–33)
MCH RBC QN AUTO: 36.1 PG (ref 26.5–33)
MCHC RBC AUTO-ENTMCNC: 31.6 G/DL (ref 31.5–36.5)
MCHC RBC AUTO-ENTMCNC: 32.7 G/DL (ref 31.5–36.5)
MCHC RBC AUTO-ENTMCNC: 33.4 G/DL (ref 31.5–36.5)
MCV RBC AUTO: 108 FL (ref 78–100)
MCV RBC AUTO: 110 FL (ref 78–100)
MCV RBC AUTO: 111 FL (ref 78–100)
METAMYELOCYTES # BLD: 0.1 10E9/L
METAMYELOCYTES NFR BLD MANUAL: 1.8 %
MICRO REPORT STATUS: NORMAL
MONOCYTES # BLD AUTO: 0.5 10E9/L (ref 0–1.3)
MONOCYTES # BLD AUTO: 0.5 10E9/L (ref 0–1.3)
MONOCYTES NFR BLD AUTO: 4.7 %
MONOCYTES NFR BLD AUTO: 9.3 %
MYELOCYTES # BLD: 0.1 10E9/L
MYELOCYTES NFR BLD MANUAL: 2.8 %
NEUTROPHILS # BLD AUTO: 4 10E9/L (ref 1.6–8.3)
NEUTROPHILS # BLD AUTO: 8.6 10E9/L (ref 1.6–8.3)
NEUTROPHILS NFR BLD AUTO: 77.8 %
NEUTROPHILS NFR BLD AUTO: 84.8 %
NITRATE UR QL: NEGATIVE
NRBC # BLD AUTO: 0.1 10*3/UL
NRBC BLD AUTO-RTO: 1 /100
NT-PROBNP SERPL-MCNC: 2583 PG/ML (ref 0–900)
O2/TOTAL GAS SETTING VFR VENT: 60 %
O2/TOTAL GAS SETTING VFR VENT: 60 %
O2/TOTAL GAS SETTING VFR VENT: 70 %
O2/TOTAL GAS SETTING VFR VENT: 80 %
O2/TOTAL GAS SETTING VFR VENT: ABNORMAL %
OXYHGB MFR BLD: 90 % (ref 92–100)
OXYHGB MFR BLD: 91 % (ref 92–100)
OXYHGB MFR BLD: 91 % (ref 92–100)
OXYHGB MFR BLD: 93 % (ref 92–100)
OXYHGB MFR BLD: 93 % (ref 92–100)
OXYHGB MFR BLD: 95 % (ref 92–100)
OXYHGB MFR BLD: 96 % (ref 92–100)
OXYHGB MFR BLD: 97 % (ref 92–100)
OXYHGB MFR BLDV: 48 %
OXYHGB MFR BLDV: 49 %
PCO2 BLD: 29 MM HG (ref 35–45)
PCO2 BLD: 29 MM HG (ref 35–45)
PCO2 BLD: 30 MM HG (ref 35–45)
PCO2 BLD: 32 MM HG (ref 35–45)
PCO2 BLD: 35 MM HG (ref 35–45)
PCO2 BLD: 36 MM HG (ref 35–45)
PCO2 BLD: 36 MM HG (ref 35–45)
PCO2 BLD: 37 MM HG (ref 35–45)
PCO2 BLD: 40 MM HG (ref 35–45)
PCO2 BLD: 41 MM HG (ref 35–45)
PCO2 BLD: 49 MM HG (ref 35–45)
PCO2 BLDV: 41 MM HG (ref 40–50)
PCO2 BLDV: 43 MM HG (ref 40–50)
PCO2 BLDV: 56 MM HG (ref 40–50)
PCO2 BLDV: 56 MM HG (ref 40–50)
PH BLD: 7.04 PH (ref 7.35–7.45)
PH BLD: 7.14 PH (ref 7.35–7.45)
PH BLD: 7.19 PH (ref 7.35–7.45)
PH BLD: 7.27 PH (ref 7.35–7.45)
PH BLD: 7.31 PH (ref 7.35–7.45)
PH BLD: 7.32 PH (ref 7.35–7.45)
PH BLD: 7.34 PH (ref 7.35–7.45)
PH BLD: 7.36 PH (ref 7.35–7.45)
PH BLD: 7.39 PH (ref 7.35–7.45)
PH BLD: 7.4 PH (ref 7.35–7.45)
PH BLD: 7.41 PH (ref 7.35–7.45)
PH BLDV: 7.1 PH (ref 7.32–7.43)
PH BLDV: 7.11 PH (ref 7.32–7.43)
PH BLDV: 7.27 PH (ref 7.32–7.43)
PH BLDV: 7.31 PH (ref 7.32–7.43)
PH UR STRIP: 6 PH (ref 5–7)
PLATELET # BLD AUTO: 104 10E9/L (ref 150–450)
PLATELET # BLD AUTO: 58 10E9/L (ref 150–450)
PLATELET # BLD AUTO: 80 10E9/L (ref 150–450)
PLATELET # BLD EST: ABNORMAL 10*3/UL
PO2 BLD: 119 MM HG (ref 80–105)
PO2 BLD: 141 MM HG (ref 80–105)
PO2 BLD: 55 MM HG (ref 80–105)
PO2 BLD: 67 MM HG (ref 80–105)
PO2 BLD: 69 MM HG (ref 80–105)
PO2 BLD: 76 MM HG (ref 80–105)
PO2 BLD: 80 MM HG (ref 80–105)
PO2 BLD: 82 MM HG (ref 80–105)
PO2 BLD: 82 MM HG (ref 80–105)
PO2 BLD: 86 MM HG (ref 80–105)
PO2 BLD: 88 MM HG (ref 80–105)
PO2 BLDV: 24 MM HG (ref 25–47)
PO2 BLDV: 24 MM HG (ref 25–47)
PO2 BLDV: 32 MM HG (ref 25–47)
PO2 BLDV: 33 MM HG (ref 25–47)
POIKILOCYTOSIS BLD QL SMEAR: SLIGHT
POTASSIUM BLD-SCNC: 5.3 MMOL/L (ref 3.4–5.3)
POTASSIUM SERPL-SCNC: 3.6 MMOL/L (ref 3.4–5.3)
POTASSIUM SERPL-SCNC: 3.7 MMOL/L (ref 3.4–5.3)
POTASSIUM SERPL-SCNC: 3.8 MMOL/L (ref 3.4–5.3)
POTASSIUM SERPL-SCNC: 5.2 MMOL/L (ref 3.4–5.3)
PROT SERPL-MCNC: 4.3 G/DL (ref 6.8–8.8)
PROT SERPL-MCNC: 4.4 G/DL (ref 6.8–8.8)
PROT SERPL-MCNC: 4.6 G/DL (ref 6.8–8.8)
RADIOLOGIST FLAGS: ABNORMAL
RBC # BLD AUTO: 2.68 10E12/L (ref 3.8–5.2)
RBC # BLD AUTO: 3.21 10E12/L (ref 3.8–5.2)
RBC # BLD AUTO: 3.27 10E12/L (ref 3.8–5.2)
RBC #/AREA URNS AUTO: 51 /HPF (ref 0–2)
SAO2 % BLDA FROM PO2: 73 % (ref 92–100)
SAO2 % BLDA FROM PO2: 92 % (ref 92–100)
SAO2 % BLDA FROM PO2: 94 % (ref 92–100)
SAO2 % BLDV FROM PO2: 26 %
SAO2 % BLDV FROM PO2: 26 %
SODIUM BLD-SCNC: 130 MMOL/L (ref 133–144)
SODIUM SERPL-SCNC: 131 MMOL/L (ref 133–144)
SODIUM SERPL-SCNC: 138 MMOL/L (ref 133–144)
SODIUM SERPL-SCNC: 142 MMOL/L (ref 133–144)
SODIUM SERPL-SCNC: 142 MMOL/L (ref 133–144)
SP GR UR STRIP: 1.03 (ref 1–1.03)
SPECIMEN SOURCE: NORMAL
TRANS CELLS #/AREA URNS HPF: 4 /HPF (ref 0–1)
TROPONIN I BLD-MCNC: 0.19 UG/L (ref 0–0.1)
TROPONIN I SERPL-MCNC: 0.28 UG/L (ref 0–0.04)
TROPONIN I SERPL-MCNC: 0.91 UG/L (ref 0–0.04)
TROPONIN I SERPL-MCNC: 2.03 UG/L (ref 0–0.04)
URN SPEC COLLECT METH UR: ABNORMAL
UROBILINOGEN UR STRIP-MCNC: NORMAL MG/DL (ref 0–2)
WBC # BLD AUTO: 10.2 10E9/L (ref 4–11)
WBC # BLD AUTO: 10.9 10E9/L (ref 4–11)
WBC # BLD AUTO: 5.2 10E9/L (ref 4–11)
WBC #/AREA URNS AUTO: 47 /HPF (ref 0–2)

## 2017-05-29 PROCEDURE — 74176 CT ABD & PELVIS W/O CONTRAST: CPT

## 2017-05-29 PROCEDURE — 87205 SMEAR GRAM STAIN: CPT | Performed by: INTERNAL MEDICINE

## 2017-05-29 PROCEDURE — 40000497 ZZHCL STATISTIC SODIUM ED POCT

## 2017-05-29 PROCEDURE — 27210807 ZZH SHEATH CR6

## 2017-05-29 PROCEDURE — 93325 DOPPLER ECHO COLOR FLOW MAPG: CPT | Mod: 26 | Performed by: INTERNAL MEDICINE

## 2017-05-29 PROCEDURE — 25000125 ZZHC RX 250: Performed by: INTERNAL MEDICINE

## 2017-05-29 PROCEDURE — 83605 ASSAY OF LACTIC ACID: CPT | Performed by: INTERNAL MEDICINE

## 2017-05-29 PROCEDURE — 25000128 H RX IP 250 OP 636: Performed by: INTERNAL MEDICINE

## 2017-05-29 PROCEDURE — 83735 ASSAY OF MAGNESIUM: CPT | Performed by: INTERNAL MEDICINE

## 2017-05-29 PROCEDURE — 85520 HEPARIN ASSAY: CPT | Performed by: INTERNAL MEDICINE

## 2017-05-29 PROCEDURE — 82330 ASSAY OF CALCIUM: CPT | Performed by: INTERNAL MEDICINE

## 2017-05-29 PROCEDURE — 99233 SBSQ HOSP IP/OBS HIGH 50: CPT | Mod: 25 | Performed by: INTERNAL MEDICINE

## 2017-05-29 PROCEDURE — 25000128 H RX IP 250 OP 636: Performed by: STUDENT IN AN ORGANIZED HEALTH CARE EDUCATION/TRAINING PROGRAM

## 2017-05-29 PROCEDURE — 27210757 ZZH CATH TEMP PACING HEART CR8

## 2017-05-29 PROCEDURE — 87040 BLOOD CULTURE FOR BACTERIA: CPT | Performed by: INTERNAL MEDICINE

## 2017-05-29 PROCEDURE — 93321 DOPPLER ECHO F-UP/LMTD STD: CPT | Mod: 26 | Performed by: INTERNAL MEDICINE

## 2017-05-29 PROCEDURE — 27211163 ZZH HYPOTHERMIC CATHETER/KIT CR19

## 2017-05-29 PROCEDURE — 85025 COMPLETE CBC W/AUTO DIFF WBC: CPT | Performed by: INTERNAL MEDICINE

## 2017-05-29 PROCEDURE — 83880 ASSAY OF NATRIURETIC PEPTIDE: CPT | Performed by: INTERNAL MEDICINE

## 2017-05-29 PROCEDURE — 85730 THROMBOPLASTIN TIME PARTIAL: CPT | Performed by: INTERNAL MEDICINE

## 2017-05-29 PROCEDURE — 85027 COMPLETE CBC AUTOMATED: CPT | Performed by: INTERNAL MEDICINE

## 2017-05-29 PROCEDURE — 40000501 ZZHCL STATISTIC HEMATOCRIT ED POCT

## 2017-05-29 PROCEDURE — 70450 CT HEAD/BRAIN W/O DYE: CPT

## 2017-05-29 PROCEDURE — 40000281 ZZH STATISTIC TRANSPORT TIME EA 15 MIN

## 2017-05-29 PROCEDURE — 83605 ASSAY OF LACTIC ACID: CPT

## 2017-05-29 PROCEDURE — 83051 HEMOGLOBIN PLASMA: CPT | Performed by: INTERNAL MEDICINE

## 2017-05-29 PROCEDURE — 94002 VENT MGMT INPAT INIT DAY: CPT

## 2017-05-29 PROCEDURE — 86850 RBC ANTIBODY SCREEN: CPT | Performed by: INTERNAL MEDICINE

## 2017-05-29 PROCEDURE — 93005 ELECTROCARDIOGRAM TRACING: CPT

## 2017-05-29 PROCEDURE — 25500064 ZZH RX 255 OP 636: Performed by: INTERNAL MEDICINE

## 2017-05-29 PROCEDURE — 25000128 H RX IP 250 OP 636

## 2017-05-29 PROCEDURE — 86901 BLOOD TYPING SEROLOGIC RH(D): CPT | Performed by: INTERNAL MEDICINE

## 2017-05-29 PROCEDURE — 84484 ASSAY OF TROPONIN QUANT: CPT | Performed by: INTERNAL MEDICINE

## 2017-05-29 PROCEDURE — 40000986 XR ABDOMEN PORT F1 VW

## 2017-05-29 PROCEDURE — 85347 COAGULATION TIME ACTIVATED: CPT

## 2017-05-29 PROCEDURE — 83615 LACTATE (LD) (LDH) ENZYME: CPT | Performed by: INTERNAL MEDICINE

## 2017-05-29 PROCEDURE — 00000146 ZZHCL STATISTIC GLUCOSE BY METER IP

## 2017-05-29 PROCEDURE — 40000275 ZZH STATISTIC RCP TIME EA 10 MIN

## 2017-05-29 PROCEDURE — 40000498 ZZHCL STATISTIC POTASSIUM ED POCT

## 2017-05-29 PROCEDURE — 40000264 ECHO LIMITED WITH OPTISON

## 2017-05-29 PROCEDURE — 86900 BLOOD TYPING SEROLOGIC ABO: CPT | Performed by: INTERNAL MEDICINE

## 2017-05-29 PROCEDURE — 81001 URINALYSIS AUTO W/SCOPE: CPT | Performed by: INTERNAL MEDICINE

## 2017-05-29 PROCEDURE — 85610 PROTHROMBIN TIME: CPT | Performed by: INTERNAL MEDICINE

## 2017-05-29 PROCEDURE — 5A1223Z PERFORMANCE OF CARDIAC PACING, CONTINUOUS: ICD-10-PCS | Performed by: INTERNAL MEDICINE

## 2017-05-29 PROCEDURE — 86923 COMPATIBILITY TEST ELECTRIC: CPT | Performed by: INTERNAL MEDICINE

## 2017-05-29 PROCEDURE — 93971 EXTREMITY STUDY: CPT | Mod: LT

## 2017-05-29 PROCEDURE — 84484 ASSAY OF TROPONIN QUANT: CPT

## 2017-05-29 PROCEDURE — 36600 WITHDRAWAL OF ARTERIAL BLOOD: CPT

## 2017-05-29 PROCEDURE — 33210 INSERT ELECTRD/PM CATH SNGL: CPT

## 2017-05-29 PROCEDURE — 3E043XZ INTRODUCTION OF VASOPRESSOR INTO CENTRAL VEIN, PERCUTANEOUS APPROACH: ICD-10-PCS | Performed by: SURGERY

## 2017-05-29 PROCEDURE — 80307 DRUG TEST PRSMV CHEM ANLYZR: CPT | Performed by: INTERNAL MEDICINE

## 2017-05-29 PROCEDURE — 85379 FIBRIN DEGRADATION QUANT: CPT | Performed by: INTERNAL MEDICINE

## 2017-05-29 PROCEDURE — 80048 BASIC METABOLIC PNL TOTAL CA: CPT | Performed by: INTERNAL MEDICINE

## 2017-05-29 PROCEDURE — 93010 ELECTROCARDIOGRAM REPORT: CPT | Mod: 76 | Performed by: INTERNAL MEDICINE

## 2017-05-29 PROCEDURE — 80053 COMPREHEN METABOLIC PANEL: CPT | Performed by: INTERNAL MEDICINE

## 2017-05-29 PROCEDURE — 27210742 ZZH CATH CR1

## 2017-05-29 PROCEDURE — 71010 XR CHEST PORT 1 VW: CPT

## 2017-05-29 PROCEDURE — 25000125 ZZHC RX 250: Performed by: STUDENT IN AN ORGANIZED HEALTH CARE EDUCATION/TRAINING PROGRAM

## 2017-05-29 PROCEDURE — 40000502 ZZHCL STATISTIC GLUCOSE ED POCT

## 2017-05-29 PROCEDURE — 85384 FIBRINOGEN ACTIVITY: CPT | Performed by: INTERNAL MEDICINE

## 2017-05-29 PROCEDURE — 20000004 ZZH R&B ICU UMMC

## 2017-05-29 PROCEDURE — 87086 URINE CULTURE/COLONY COUNT: CPT | Performed by: INTERNAL MEDICINE

## 2017-05-29 PROCEDURE — 86140 C-REACTIVE PROTEIN: CPT | Performed by: INTERNAL MEDICINE

## 2017-05-29 PROCEDURE — 25000131 ZZH RX MED GY IP 250 OP 636 PS 637: Performed by: INTERNAL MEDICINE

## 2017-05-29 PROCEDURE — 87070 CULTURE OTHR SPECIMN AEROBIC: CPT | Performed by: INTERNAL MEDICINE

## 2017-05-29 PROCEDURE — 85652 RBC SED RATE AUTOMATED: CPT | Performed by: INTERNAL MEDICINE

## 2017-05-29 PROCEDURE — 93308 TTE F-UP OR LMTD: CPT | Mod: 26 | Performed by: INTERNAL MEDICINE

## 2017-05-29 PROCEDURE — 82947 ASSAY GLUCOSE BLOOD QUANT: CPT | Performed by: INTERNAL MEDICINE

## 2017-05-29 PROCEDURE — 6A4Z0ZZ HYPOTHERMIA, SINGLE: ICD-10-PCS | Performed by: INTERNAL MEDICINE

## 2017-05-29 PROCEDURE — 27211165 ZZH HYPOTHERMIC CATHETER/KIT CR13

## 2017-05-29 PROCEDURE — 27210946 ZZH KIT HC TOTES DISP CR8

## 2017-05-29 PROCEDURE — 40000940 XR CHEST PORT 1 VW

## 2017-05-29 PROCEDURE — 93454 CORONARY ARTERY ANGIO S&I: CPT | Mod: 26 | Performed by: INTERNAL MEDICINE

## 2017-05-29 PROCEDURE — 82330 ASSAY OF CALCIUM: CPT

## 2017-05-29 PROCEDURE — 25800025 ZZH RX 258

## 2017-05-29 PROCEDURE — 27210787 ZZH MANIFOLD CR2

## 2017-05-29 PROCEDURE — 93308 TTE F-UP OR LMTD: CPT

## 2017-05-29 PROCEDURE — 93931 UPPER EXTREMITY STUDY: CPT | Mod: LT

## 2017-05-29 PROCEDURE — 93454 CORONARY ARTERY ANGIO S&I: CPT

## 2017-05-29 PROCEDURE — C1894 INTRO/SHEATH, NON-LASER: HCPCS

## 2017-05-29 PROCEDURE — 27211089 ZZH KIT ACIST INJECTOR CR3

## 2017-05-29 PROCEDURE — B2111ZZ FLUOROSCOPY OF MULTIPLE CORONARY ARTERIES USING LOW OSMOLAR CONTRAST: ICD-10-PCS | Performed by: INTERNAL MEDICINE

## 2017-05-29 PROCEDURE — 99291 CRITICAL CARE FIRST HOUR: CPT | Mod: 25 | Performed by: INTERNAL MEDICINE

## 2017-05-29 PROCEDURE — 82805 BLOOD GASES W/O2 SATURATION: CPT | Performed by: INTERNAL MEDICINE

## 2017-05-29 PROCEDURE — 99152 MOD SED SAME PHYS/QHP 5/>YRS: CPT

## 2017-05-29 PROCEDURE — 85610 PROTHROMBIN TIME: CPT | Mod: QW

## 2017-05-29 PROCEDURE — 82803 BLOOD GASES ANY COMBINATION: CPT

## 2017-05-29 PROCEDURE — 40000809 ZZH STATISTIC NO DOCUMENTATION TO SUPPORT CHARGE

## 2017-05-29 PROCEDURE — 95951 ZZHC EEG VIDEO < 12 HR: CPT | Mod: 52,ZF

## 2017-05-29 PROCEDURE — 40000014 ZZH STATISTIC ARTERIAL MONITORING DAILY

## 2017-05-29 PROCEDURE — 25000132 ZZH RX MED GY IP 250 OP 250 PS 637: Performed by: INTERNAL MEDICINE

## 2017-05-29 PROCEDURE — 99153 MOD SED SAME PHYS/QHP EA: CPT

## 2017-05-29 RX ORDER — DEXTROSE MONOHYDRATE 25 G/50ML
25-50 INJECTION, SOLUTION INTRAVENOUS
Status: DISCONTINUED | OUTPATIENT
Start: 2017-05-29 | End: 2017-06-06

## 2017-05-29 RX ORDER — ACETAMINOPHEN 325 MG/1
650 TABLET ORAL EVERY 4 HOURS PRN
Status: DISCONTINUED | OUTPATIENT
Start: 2017-05-29 | End: 2017-05-29

## 2017-05-29 RX ORDER — FUROSEMIDE 10 MG/ML
20-100 INJECTION INTRAMUSCULAR; INTRAVENOUS
Status: DISCONTINUED | OUTPATIENT
Start: 2017-05-29 | End: 2017-05-29

## 2017-05-29 RX ORDER — NICOTINE POLACRILEX 4 MG
15-30 LOZENGE BUCCAL
Status: DISCONTINUED | OUTPATIENT
Start: 2017-05-29 | End: 2017-06-06

## 2017-05-29 RX ORDER — PHENYLEPHRINE HCL IN 0.9% NACL 1 MG/10 ML
20-100 SYRINGE (ML) INTRAVENOUS
Status: DISCONTINUED | OUTPATIENT
Start: 2017-05-29 | End: 2017-05-29

## 2017-05-29 RX ORDER — EPTIFIBATIDE 2 MG/ML
180 INJECTION, SOLUTION INTRAVENOUS EVERY 10 MIN PRN
Status: DISCONTINUED | OUTPATIENT
Start: 2017-05-29 | End: 2017-05-29 | Stop reason: HOSPADM

## 2017-05-29 RX ORDER — DEXTROSE MONOHYDRATE 25 G/50ML
12.5-5 INJECTION, SOLUTION INTRAVENOUS EVERY 30 MIN PRN
Status: DISCONTINUED | OUTPATIENT
Start: 2017-05-29 | End: 2017-05-29

## 2017-05-29 RX ORDER — SODIUM CHLORIDE 9 MG/ML
INJECTION, SOLUTION INTRAVENOUS CONTINUOUS
Status: DISCONTINUED | OUTPATIENT
Start: 2017-05-29 | End: 2017-05-29

## 2017-05-29 RX ORDER — FUROSEMIDE 10 MG/ML
80 INJECTION INTRAMUSCULAR; INTRAVENOUS ONCE
Status: COMPLETED | OUTPATIENT
Start: 2017-05-29 | End: 2017-05-29

## 2017-05-29 RX ORDER — POTASSIUM CHLORIDE 29.8 MG/ML
20 INJECTION INTRAVENOUS
Status: DISCONTINUED | OUTPATIENT
Start: 2017-05-29 | End: 2017-05-29

## 2017-05-29 RX ORDER — NALOXONE HYDROCHLORIDE 0.4 MG/ML
0.4 INJECTION, SOLUTION INTRAMUSCULAR; INTRAVENOUS; SUBCUTANEOUS EVERY 5 MIN PRN
Status: DISCONTINUED | OUTPATIENT
Start: 2017-05-29 | End: 2017-05-29

## 2017-05-29 RX ORDER — NALOXONE HYDROCHLORIDE 0.4 MG/ML
.1-.4 INJECTION, SOLUTION INTRAMUSCULAR; INTRAVENOUS; SUBCUTANEOUS
Status: DISCONTINUED | OUTPATIENT
Start: 2017-05-29 | End: 2017-05-29

## 2017-05-29 RX ORDER — NITROGLYCERIN 5 MG/ML
100-500 VIAL (ML) INTRAVENOUS
Status: DISCONTINUED | OUTPATIENT
Start: 2017-05-29 | End: 2017-05-29

## 2017-05-29 RX ORDER — MEROPENEM 1 G/1
1 INJECTION, POWDER, FOR SOLUTION INTRAVENOUS EVERY 12 HOURS
Status: DISCONTINUED | OUTPATIENT
Start: 2017-05-30 | End: 2017-06-03

## 2017-05-29 RX ORDER — ONDANSETRON 2 MG/ML
4 INJECTION INTRAMUSCULAR; INTRAVENOUS EVERY 4 HOURS PRN
Status: DISCONTINUED | OUTPATIENT
Start: 2017-05-29 | End: 2017-05-29

## 2017-05-29 RX ORDER — PROTAMINE SULFATE 10 MG/ML
25-100 INJECTION, SOLUTION INTRAVENOUS EVERY 5 MIN PRN
Status: DISCONTINUED | OUTPATIENT
Start: 2017-05-29 | End: 2017-05-29 | Stop reason: HOSPADM

## 2017-05-29 RX ORDER — DOPAMINE HYDROCHLORIDE 160 MG/100ML
2-20 INJECTION, SOLUTION INTRAVENOUS CONTINUOUS PRN
Status: DISCONTINUED | OUTPATIENT
Start: 2017-05-29 | End: 2017-05-29 | Stop reason: HOSPADM

## 2017-05-29 RX ORDER — MAGNESIUM SULFATE HEPTAHYDRATE 40 MG/ML
4 INJECTION, SOLUTION INTRAVENOUS EVERY 4 HOURS PRN
Status: DISCONTINUED | OUTPATIENT
Start: 2017-05-29 | End: 2017-06-01

## 2017-05-29 RX ORDER — FLUMAZENIL 0.1 MG/ML
0.2 INJECTION, SOLUTION INTRAVENOUS
Status: DISCONTINUED | OUTPATIENT
Start: 2017-05-29 | End: 2017-05-29

## 2017-05-29 RX ORDER — NICARDIPINE HYDROCHLORIDE 2.5 MG/ML
100 INJECTION INTRAVENOUS
Status: DISCONTINUED | OUTPATIENT
Start: 2017-05-29 | End: 2017-05-29

## 2017-05-29 RX ORDER — POTASSIUM CHLORIDE 29.8 MG/ML
20 INJECTION INTRAVENOUS
Status: DISCONTINUED | OUTPATIENT
Start: 2017-05-29 | End: 2017-06-04

## 2017-05-29 RX ORDER — METHYLPREDNISOLONE SODIUM SUCCINATE 125 MG/2ML
125 INJECTION, POWDER, LYOPHILIZED, FOR SOLUTION INTRAMUSCULAR; INTRAVENOUS
Status: DISCONTINUED | OUTPATIENT
Start: 2017-05-29 | End: 2017-05-29 | Stop reason: HOSPADM

## 2017-05-29 RX ORDER — DIPHENHYDRAMINE HYDROCHLORIDE 50 MG/ML
25-50 INJECTION INTRAMUSCULAR; INTRAVENOUS
Status: DISCONTINUED | OUTPATIENT
Start: 2017-05-29 | End: 2017-05-29 | Stop reason: HOSPADM

## 2017-05-29 RX ORDER — FUROSEMIDE 10 MG/ML
40 INJECTION INTRAMUSCULAR; INTRAVENOUS ONCE
Status: COMPLETED | OUTPATIENT
Start: 2017-05-29 | End: 2017-05-29

## 2017-05-29 RX ORDER — FOLIC ACID 1 MG/1
1 TABLET ORAL DAILY
Status: DISCONTINUED | OUTPATIENT
Start: 2017-05-29 | End: 2017-05-29

## 2017-05-29 RX ORDER — ACETAMINOPHEN 325 MG/1
650 TABLET ORAL EVERY 4 HOURS PRN
Status: DISCONTINUED | OUTPATIENT
Start: 2017-05-29 | End: 2017-06-16 | Stop reason: HOSPADM

## 2017-05-29 RX ORDER — CLOPIDOGREL BISULFATE 75 MG/1
75 TABLET ORAL
Status: DISCONTINUED | OUTPATIENT
Start: 2017-05-29 | End: 2017-05-29 | Stop reason: HOSPADM

## 2017-05-29 RX ORDER — ENALAPRILAT 1.25 MG/ML
1.25-2.5 INJECTION INTRAVENOUS
Status: DISCONTINUED | OUTPATIENT
Start: 2017-05-29 | End: 2017-05-29

## 2017-05-29 RX ORDER — ATROPINE SULFATE 0.1 MG/ML
.5-1 INJECTION INTRAVENOUS
Status: DISCONTINUED | OUTPATIENT
Start: 2017-05-29 | End: 2017-05-29 | Stop reason: HOSPADM

## 2017-05-29 RX ORDER — MEPERIDINE HYDROCHLORIDE 25 MG/ML
25 INJECTION INTRAMUSCULAR; INTRAVENOUS; SUBCUTANEOUS EVERY 4 HOURS PRN
Status: DISCONTINUED | OUTPATIENT
Start: 2017-05-29 | End: 2017-06-02

## 2017-05-29 RX ORDER — PROMETHAZINE HYDROCHLORIDE 25 MG/ML
6.25-25 INJECTION, SOLUTION INTRAMUSCULAR; INTRAVENOUS EVERY 4 HOURS PRN
Status: DISCONTINUED | OUTPATIENT
Start: 2017-05-29 | End: 2017-05-29 | Stop reason: HOSPADM

## 2017-05-29 RX ORDER — ASPIRIN 81 MG/1
81 TABLET, CHEWABLE ORAL DAILY
Status: DISCONTINUED | OUTPATIENT
Start: 2017-05-30 | End: 2017-05-29

## 2017-05-29 RX ORDER — PHENYLEPHRINE HCL IN 0.9% NACL 1 MG/10 ML
20-100 SYRINGE (ML) INTRAVENOUS
Status: DISCONTINUED | OUTPATIENT
Start: 2017-05-29 | End: 2017-05-29 | Stop reason: HOSPADM

## 2017-05-29 RX ORDER — ACETAMINOPHEN 650 MG/1
650 SUPPOSITORY RECTAL EVERY 4 HOURS PRN
Status: DISCONTINUED | OUTPATIENT
Start: 2017-05-29 | End: 2017-06-16 | Stop reason: HOSPADM

## 2017-05-29 RX ORDER — ADENOSINE 3 MG/ML
12-12000 INJECTION, SOLUTION INTRAVENOUS
Status: DISCONTINUED | OUTPATIENT
Start: 2017-05-29 | End: 2017-05-29

## 2017-05-29 RX ORDER — NITROGLYCERIN 0.4 MG/1
0.4 TABLET SUBLINGUAL EVERY 5 MIN PRN
Status: DISCONTINUED | OUTPATIENT
Start: 2017-05-29 | End: 2017-05-29

## 2017-05-29 RX ORDER — FLUMAZENIL 0.1 MG/ML
0.2 INJECTION, SOLUTION INTRAVENOUS
Status: DISCONTINUED | OUTPATIENT
Start: 2017-05-29 | End: 2017-05-29 | Stop reason: HOSPADM

## 2017-05-29 RX ORDER — MAGNESIUM HYDROXIDE 1200 MG/15ML
1000 LIQUID ORAL CONTINUOUS PRN
Status: DISCONTINUED | OUTPATIENT
Start: 2017-05-29 | End: 2017-05-29 | Stop reason: HOSPADM

## 2017-05-29 RX ORDER — GABAPENTIN 250 MG/5ML
200 SOLUTION ORAL 3 TIMES DAILY
Status: DISCONTINUED | OUTPATIENT
Start: 2017-05-29 | End: 2017-06-08

## 2017-05-29 RX ORDER — LORAZEPAM 2 MG/ML
.5-2 INJECTION INTRAMUSCULAR EVERY 4 HOURS PRN
Status: DISCONTINUED | OUTPATIENT
Start: 2017-05-29 | End: 2017-05-29

## 2017-05-29 RX ORDER — ARGATROBAN 1 MG/ML
350 INJECTION, SOLUTION INTRAVENOUS
Status: DISCONTINUED | OUTPATIENT
Start: 2017-05-29 | End: 2017-05-29

## 2017-05-29 RX ORDER — GABAPENTIN 100 MG/1
200 CAPSULE ORAL 3 TIMES DAILY
Status: DISCONTINUED | OUTPATIENT
Start: 2017-05-29 | End: 2017-05-29

## 2017-05-29 RX ORDER — NICARDIPINE HYDROCHLORIDE 2.5 MG/ML
100 INJECTION INTRAVENOUS
Status: DISCONTINUED | OUTPATIENT
Start: 2017-05-29 | End: 2017-05-29 | Stop reason: HOSPADM

## 2017-05-29 RX ORDER — DOPAMINE HYDROCHLORIDE 160 MG/100ML
5-20 INJECTION, SOLUTION INTRAVENOUS CONTINUOUS
Status: DISCONTINUED | OUTPATIENT
Start: 2017-05-29 | End: 2017-05-29

## 2017-05-29 RX ORDER — MAGNESIUM HYDROXIDE 1200 MG/15ML
1000 LIQUID ORAL CONTINUOUS PRN
Status: DISCONTINUED | OUTPATIENT
Start: 2017-05-29 | End: 2017-05-29

## 2017-05-29 RX ORDER — PROTAMINE SULFATE 10 MG/ML
25-100 INJECTION, SOLUTION INTRAVENOUS EVERY 5 MIN PRN
Status: DISCONTINUED | OUTPATIENT
Start: 2017-05-29 | End: 2017-05-29

## 2017-05-29 RX ORDER — METOPROLOL TARTRATE 1 MG/ML
5 INJECTION, SOLUTION INTRAVENOUS EVERY 5 MIN PRN
Status: DISCONTINUED | OUTPATIENT
Start: 2017-05-29 | End: 2017-05-29 | Stop reason: HOSPADM

## 2017-05-29 RX ORDER — CLOPIDOGREL BISULFATE 75 MG/1
300-600 TABLET ORAL
Status: DISCONTINUED | OUTPATIENT
Start: 2017-05-29 | End: 2017-05-29 | Stop reason: HOSPADM

## 2017-05-29 RX ORDER — LIDOCAINE 40 MG/G
CREAM TOPICAL
Status: DISCONTINUED | OUTPATIENT
Start: 2017-05-29 | End: 2017-05-29

## 2017-05-29 RX ORDER — ONDANSETRON 2 MG/ML
4 INJECTION INTRAMUSCULAR; INTRAVENOUS EVERY 4 HOURS PRN
Status: DISCONTINUED | OUTPATIENT
Start: 2017-05-29 | End: 2017-05-29 | Stop reason: HOSPADM

## 2017-05-29 RX ORDER — NITROGLYCERIN 20 MG/100ML
.07-2 INJECTION INTRAVENOUS CONTINUOUS PRN
Status: DISCONTINUED | OUTPATIENT
Start: 2017-05-29 | End: 2017-05-29

## 2017-05-29 RX ORDER — DIPHENHYDRAMINE HYDROCHLORIDE 50 MG/ML
25-50 INJECTION INTRAMUSCULAR; INTRAVENOUS
Status: DISCONTINUED | OUTPATIENT
Start: 2017-05-29 | End: 2017-05-29

## 2017-05-29 RX ORDER — AMOXICILLIN 250 MG
1-2 CAPSULE ORAL 2 TIMES DAILY
Status: DISCONTINUED | OUTPATIENT
Start: 2017-05-29 | End: 2017-05-29

## 2017-05-29 RX ORDER — NIFEDIPINE 10 MG/1
10 CAPSULE ORAL
Status: DISCONTINUED | OUTPATIENT
Start: 2017-05-29 | End: 2017-05-29 | Stop reason: HOSPADM

## 2017-05-29 RX ORDER — ENALAPRILAT 1.25 MG/ML
1.25-2.5 INJECTION INTRAVENOUS
Status: DISCONTINUED | OUTPATIENT
Start: 2017-05-29 | End: 2017-05-29 | Stop reason: HOSPADM

## 2017-05-29 RX ORDER — POTASSIUM CHLORIDE 7.45 MG/ML
10 INJECTION INTRAVENOUS
Status: DISCONTINUED | OUTPATIENT
Start: 2017-05-29 | End: 2017-05-29 | Stop reason: HOSPADM

## 2017-05-29 RX ORDER — NALOXONE HYDROCHLORIDE 0.4 MG/ML
.1-.4 INJECTION, SOLUTION INTRAMUSCULAR; INTRAVENOUS; SUBCUTANEOUS
Status: DISCONTINUED | OUTPATIENT
Start: 2017-05-29 | End: 2017-06-16 | Stop reason: HOSPADM

## 2017-05-29 RX ORDER — EPTIFIBATIDE 2 MG/ML
2 INJECTION, SOLUTION INTRAVENOUS CONTINUOUS PRN
Status: DISCONTINUED | OUTPATIENT
Start: 2017-05-29 | End: 2017-05-29

## 2017-05-29 RX ORDER — CALCIUM CHLORIDE 100 MG/ML
1 INJECTION INTRAVENOUS; INTRAVENTRICULAR
Status: DISCONTINUED | OUTPATIENT
Start: 2017-05-29 | End: 2017-05-29

## 2017-05-29 RX ORDER — HYDRALAZINE HYDROCHLORIDE 20 MG/ML
10-20 INJECTION INTRAMUSCULAR; INTRAVENOUS
Status: DISCONTINUED | OUTPATIENT
Start: 2017-05-29 | End: 2017-05-29 | Stop reason: HOSPADM

## 2017-05-29 RX ORDER — PROTAMINE SULFATE 10 MG/ML
1-5 INJECTION, SOLUTION INTRAVENOUS
Status: DISCONTINUED | OUTPATIENT
Start: 2017-05-29 | End: 2017-05-29

## 2017-05-29 RX ORDER — NITROGLYCERIN 5 MG/ML
100-500 VIAL (ML) INTRAVENOUS
Status: DISCONTINUED | OUTPATIENT
Start: 2017-05-29 | End: 2017-05-29 | Stop reason: HOSPADM

## 2017-05-29 RX ORDER — ASPIRIN 325 MG
325 TABLET ORAL
Status: DISCONTINUED | OUTPATIENT
Start: 2017-05-29 | End: 2017-05-29 | Stop reason: HOSPADM

## 2017-05-29 RX ORDER — EPTIFIBATIDE 2 MG/ML
2 INJECTION, SOLUTION INTRAVENOUS CONTINUOUS PRN
Status: DISCONTINUED | OUTPATIENT
Start: 2017-05-29 | End: 2017-05-29 | Stop reason: HOSPADM

## 2017-05-29 RX ORDER — POTASSIUM CHLORIDE 29.8 MG/ML
20 INJECTION INTRAVENOUS
Status: DISCONTINUED | OUTPATIENT
Start: 2017-05-29 | End: 2017-05-29 | Stop reason: HOSPADM

## 2017-05-29 RX ORDER — NITROGLYCERIN 0.4 MG/1
0.4 TABLET SUBLINGUAL EVERY 5 MIN PRN
Status: DISCONTINUED | OUTPATIENT
Start: 2017-05-29 | End: 2017-05-29 | Stop reason: HOSPADM

## 2017-05-29 RX ORDER — LIDOCAINE HYDROCHLORIDE 10 MG/ML
30 INJECTION, SOLUTION EPIDURAL; INFILTRATION; INTRACAUDAL; PERINEURAL
Status: DISCONTINUED | OUTPATIENT
Start: 2017-05-29 | End: 2017-05-29

## 2017-05-29 RX ORDER — DOPAMINE HYDROCHLORIDE 160 MG/100ML
2-20 INJECTION, SOLUTION INTRAVENOUS CONTINUOUS PRN
Status: DISCONTINUED | OUTPATIENT
Start: 2017-05-29 | End: 2017-05-29

## 2017-05-29 RX ORDER — FENTANYL CITRATE 50 UG/ML
25-50 INJECTION, SOLUTION INTRAMUSCULAR; INTRAVENOUS
Status: DISCONTINUED | OUTPATIENT
Start: 2017-05-29 | End: 2017-05-29 | Stop reason: HOSPADM

## 2017-05-29 RX ORDER — HYDRALAZINE HYDROCHLORIDE 20 MG/ML
10-20 INJECTION INTRAMUSCULAR; INTRAVENOUS
Status: DISCONTINUED | OUTPATIENT
Start: 2017-05-29 | End: 2017-05-29

## 2017-05-29 RX ORDER — NITROGLYCERIN 20 MG/100ML
.07-2 INJECTION INTRAVENOUS CONTINUOUS PRN
Status: DISCONTINUED | OUTPATIENT
Start: 2017-05-29 | End: 2017-05-29 | Stop reason: HOSPADM

## 2017-05-29 RX ORDER — ARGATROBAN 1 MG/ML
150 INJECTION, SOLUTION INTRAVENOUS
Status: DISCONTINUED | OUTPATIENT
Start: 2017-05-29 | End: 2017-05-29 | Stop reason: HOSPADM

## 2017-05-29 RX ORDER — PRASUGREL 10 MG/1
10-60 TABLET, FILM COATED ORAL
Status: DISCONTINUED | OUTPATIENT
Start: 2017-05-29 | End: 2017-05-29 | Stop reason: HOSPADM

## 2017-05-29 RX ORDER — FENTANYL CITRATE 50 UG/ML
25-50 INJECTION, SOLUTION INTRAMUSCULAR; INTRAVENOUS
Status: DISCONTINUED | OUTPATIENT
Start: 2017-05-29 | End: 2017-05-29

## 2017-05-29 RX ORDER — METOPROLOL TARTRATE 1 MG/ML
5 INJECTION, SOLUTION INTRAVENOUS EVERY 5 MIN PRN
Status: DISCONTINUED | OUTPATIENT
Start: 2017-05-29 | End: 2017-05-29

## 2017-05-29 RX ORDER — HEPARIN SODIUM 1000 [USP'U]/ML
1000-10000 INJECTION, SOLUTION INTRAVENOUS; SUBCUTANEOUS EVERY 5 MIN PRN
Status: DISCONTINUED | OUTPATIENT
Start: 2017-05-29 | End: 2017-05-29

## 2017-05-29 RX ORDER — PRASUGREL 10 MG/1
10-60 TABLET, FILM COATED ORAL
Status: DISCONTINUED | OUTPATIENT
Start: 2017-05-29 | End: 2017-05-29

## 2017-05-29 RX ORDER — LIDOCAINE 40 MG/G
CREAM TOPICAL
Status: DISCONTINUED | OUTPATIENT
Start: 2017-05-29 | End: 2017-06-16 | Stop reason: HOSPADM

## 2017-05-29 RX ORDER — PROTAMINE SULFATE 10 MG/ML
1-5 INJECTION, SOLUTION INTRAVENOUS
Status: DISCONTINUED | OUTPATIENT
Start: 2017-05-29 | End: 2017-05-29 | Stop reason: HOSPADM

## 2017-05-29 RX ORDER — ADENOSINE 3 MG/ML
12-12000 INJECTION, SOLUTION INTRAVENOUS
Status: DISCONTINUED | OUTPATIENT
Start: 2017-05-29 | End: 2017-05-29 | Stop reason: HOSPADM

## 2017-05-29 RX ORDER — DEXTROSE MONOHYDRATE 25 G/50ML
INJECTION, SOLUTION INTRAVENOUS
Status: COMPLETED
Start: 2017-05-29 | End: 2017-05-29

## 2017-05-29 RX ORDER — ARGATROBAN 1 MG/ML
150 INJECTION, SOLUTION INTRAVENOUS
Status: DISCONTINUED | OUTPATIENT
Start: 2017-05-29 | End: 2017-05-29

## 2017-05-29 RX ORDER — HEPARIN SODIUM 1000 [USP'U]/ML
1000-10000 INJECTION, SOLUTION INTRAVENOUS; SUBCUTANEOUS EVERY 5 MIN PRN
Status: DISCONTINUED | OUTPATIENT
Start: 2017-05-29 | End: 2017-05-29 | Stop reason: HOSPADM

## 2017-05-29 RX ORDER — SODIUM NITROPRUSSIDE 25 MG/ML
100-200 INJECTION INTRAVENOUS
Status: DISCONTINUED | OUTPATIENT
Start: 2017-05-29 | End: 2017-05-29

## 2017-05-29 RX ORDER — LORAZEPAM 2 MG/ML
.5-2 INJECTION INTRAMUSCULAR EVERY 4 HOURS PRN
Status: DISCONTINUED | OUTPATIENT
Start: 2017-05-29 | End: 2017-05-29 | Stop reason: HOSPADM

## 2017-05-29 RX ORDER — ASPIRIN 81 MG/1
81-324 TABLET, CHEWABLE ORAL
Status: DISCONTINUED | OUTPATIENT
Start: 2017-05-29 | End: 2017-05-29

## 2017-05-29 RX ORDER — ASPIRIN 325 MG
325 TABLET ORAL
Status: DISCONTINUED | OUTPATIENT
Start: 2017-05-29 | End: 2017-05-29

## 2017-05-29 RX ORDER — NALOXONE HYDROCHLORIDE 0.4 MG/ML
0.4 INJECTION, SOLUTION INTRAMUSCULAR; INTRAVENOUS; SUBCUTANEOUS EVERY 5 MIN PRN
Status: DISCONTINUED | OUTPATIENT
Start: 2017-05-29 | End: 2017-05-29 | Stop reason: HOSPADM

## 2017-05-29 RX ORDER — NIFEDIPINE 10 MG/1
10 CAPSULE ORAL
Status: DISCONTINUED | OUTPATIENT
Start: 2017-05-29 | End: 2017-05-29

## 2017-05-29 RX ORDER — METHYLPREDNISOLONE SODIUM SUCCINATE 125 MG/2ML
125 INJECTION, POWDER, LYOPHILIZED, FOR SOLUTION INTRAMUSCULAR; INTRAVENOUS
Status: DISCONTINUED | OUTPATIENT
Start: 2017-05-29 | End: 2017-05-29

## 2017-05-29 RX ORDER — ARGATROBAN 1 MG/ML
350 INJECTION, SOLUTION INTRAVENOUS
Status: DISCONTINUED | OUTPATIENT
Start: 2017-05-29 | End: 2017-05-29 | Stop reason: HOSPADM

## 2017-05-29 RX ORDER — POTASSIUM CHLORIDE 7.45 MG/ML
10 INJECTION INTRAVENOUS
Status: DISCONTINUED | OUTPATIENT
Start: 2017-05-29 | End: 2017-05-29

## 2017-05-29 RX ORDER — LIDOCAINE HYDROCHLORIDE 10 MG/ML
30 INJECTION, SOLUTION EPIDURAL; INFILTRATION; INTRACAUDAL; PERINEURAL
Status: DISCONTINUED | OUTPATIENT
Start: 2017-05-29 | End: 2017-05-29 | Stop reason: HOSPADM

## 2017-05-29 RX ORDER — DOBUTAMINE HYDROCHLORIDE 200 MG/100ML
2-20 INJECTION INTRAVENOUS CONTINUOUS PRN
Status: DISCONTINUED | OUTPATIENT
Start: 2017-05-29 | End: 2017-05-29

## 2017-05-29 RX ORDER — EPTIFIBATIDE 2 MG/ML
180 INJECTION, SOLUTION INTRAVENOUS EVERY 10 MIN PRN
Status: DISCONTINUED | OUTPATIENT
Start: 2017-05-29 | End: 2017-05-29

## 2017-05-29 RX ORDER — SODIUM CHLORIDE 9 MG/ML
INJECTION, SOLUTION INTRAVENOUS
Status: COMPLETED
Start: 2017-05-29 | End: 2017-05-29

## 2017-05-29 RX ORDER — ATROPINE SULFATE 0.1 MG/ML
.5-1 INJECTION INTRAVENOUS
Status: DISCONTINUED | OUTPATIENT
Start: 2017-05-29 | End: 2017-05-29

## 2017-05-29 RX ORDER — AMOXICILLIN 250 MG
1-2 CAPSULE ORAL 2 TIMES DAILY
Status: DISCONTINUED | OUTPATIENT
Start: 2017-05-29 | End: 2017-06-16 | Stop reason: HOSPADM

## 2017-05-29 RX ORDER — NITROGLYCERIN 5 MG/ML
100-200 VIAL (ML) INTRAVENOUS
Status: DISCONTINUED | OUTPATIENT
Start: 2017-05-29 | End: 2017-05-29

## 2017-05-29 RX ORDER — FOLIC ACID 1 MG/1
1 TABLET ORAL DAILY
Status: DISCONTINUED | OUTPATIENT
Start: 2017-05-29 | End: 2017-06-16 | Stop reason: HOSPADM

## 2017-05-29 RX ORDER — CLOPIDOGREL BISULFATE 75 MG/1
75 TABLET ORAL
Status: DISCONTINUED | OUTPATIENT
Start: 2017-05-29 | End: 2017-05-29

## 2017-05-29 RX ORDER — SODIUM NITROPRUSSIDE 25 MG/ML
100-200 INJECTION INTRAVENOUS
Status: DISCONTINUED | OUTPATIENT
Start: 2017-05-29 | End: 2017-05-29 | Stop reason: HOSPADM

## 2017-05-29 RX ORDER — DOBUTAMINE HYDROCHLORIDE 200 MG/100ML
2-20 INJECTION INTRAVENOUS CONTINUOUS PRN
Status: DISCONTINUED | OUTPATIENT
Start: 2017-05-29 | End: 2017-05-29 | Stop reason: HOSPADM

## 2017-05-29 RX ORDER — MEROPENEM 1 G/1
1 INJECTION, POWDER, FOR SOLUTION INTRAVENOUS EVERY 8 HOURS
Status: DISCONTINUED | OUTPATIENT
Start: 2017-05-29 | End: 2017-05-29

## 2017-05-29 RX ORDER — MAGNESIUM SULFATE HEPTAHYDRATE 40 MG/ML
4 INJECTION, SOLUTION INTRAVENOUS EVERY 4 HOURS PRN
Status: DISCONTINUED | OUTPATIENT
Start: 2017-05-29 | End: 2017-05-29

## 2017-05-29 RX ORDER — IOPAMIDOL 755 MG/ML
200 INJECTION, SOLUTION INTRAVASCULAR ONCE
Status: COMPLETED | OUTPATIENT
Start: 2017-05-29 | End: 2017-05-29

## 2017-05-29 RX ORDER — CLOPIDOGREL BISULFATE 75 MG/1
300-600 TABLET ORAL
Status: DISCONTINUED | OUTPATIENT
Start: 2017-05-29 | End: 2017-05-29

## 2017-05-29 RX ORDER — ALUMINA, MAGNESIA, AND SIMETHICONE 2400; 2400; 240 MG/30ML; MG/30ML; MG/30ML
15-30 SUSPENSION ORAL EVERY 4 HOURS PRN
Status: DISCONTINUED | OUTPATIENT
Start: 2017-05-29 | End: 2017-06-16 | Stop reason: HOSPADM

## 2017-05-29 RX ORDER — ACETAMINOPHEN 650 MG/1
650 SUPPOSITORY RECTAL EVERY 4 HOURS PRN
Status: DISCONTINUED | OUTPATIENT
Start: 2017-05-29 | End: 2017-05-29

## 2017-05-29 RX ORDER — PROMETHAZINE HYDROCHLORIDE 25 MG/ML
6.25-25 INJECTION, SOLUTION INTRAMUSCULAR; INTRAVENOUS EVERY 4 HOURS PRN
Status: DISCONTINUED | OUTPATIENT
Start: 2017-05-29 | End: 2017-05-29

## 2017-05-29 RX ORDER — VERAPAMIL HYDROCHLORIDE 2.5 MG/ML
1-5 INJECTION, SOLUTION INTRAVENOUS
Status: DISCONTINUED | OUTPATIENT
Start: 2017-05-29 | End: 2017-05-29

## 2017-05-29 RX ORDER — DEXTROSE MONOHYDRATE 25 G/50ML
12.5-5 INJECTION, SOLUTION INTRAVENOUS EVERY 30 MIN PRN
Status: DISCONTINUED | OUTPATIENT
Start: 2017-05-29 | End: 2017-05-29 | Stop reason: HOSPADM

## 2017-05-29 RX ORDER — ALUMINA, MAGNESIA, AND SIMETHICONE 2400; 2400; 240 MG/30ML; MG/30ML; MG/30ML
15-30 SUSPENSION ORAL EVERY 4 HOURS PRN
Status: DISCONTINUED | OUTPATIENT
Start: 2017-05-29 | End: 2017-05-29

## 2017-05-29 RX ORDER — VERAPAMIL HYDROCHLORIDE 2.5 MG/ML
1-5 INJECTION, SOLUTION INTRAVENOUS
Status: DISCONTINUED | OUTPATIENT
Start: 2017-05-29 | End: 2017-05-29 | Stop reason: HOSPADM

## 2017-05-29 RX ORDER — FUROSEMIDE 10 MG/ML
20-100 INJECTION INTRAMUSCULAR; INTRAVENOUS
Status: DISCONTINUED | OUTPATIENT
Start: 2017-05-29 | End: 2017-05-29 | Stop reason: HOSPADM

## 2017-05-29 RX ORDER — NITROGLYCERIN 5 MG/ML
100-200 VIAL (ML) INTRAVENOUS
Status: DISCONTINUED | OUTPATIENT
Start: 2017-05-29 | End: 2017-05-29 | Stop reason: HOSPADM

## 2017-05-29 RX ADMIN — SODIUM BICARBONATE 50 MEQ: 84 INJECTION, SOLUTION INTRAVENOUS at 18:45

## 2017-05-29 RX ADMIN — Medication 50 MCG/HR: at 12:17

## 2017-05-29 RX ADMIN — FUROSEMIDE 80 MG: 10 INJECTION, SOLUTION INTRAVENOUS at 15:28

## 2017-05-29 RX ADMIN — Medication 2 G: at 14:00

## 2017-05-29 RX ADMIN — Medication 3 MG: at 14:45

## 2017-05-29 RX ADMIN — HUMAN INSULIN 1 UNITS/HR: 100 INJECTION, SOLUTION SUBCUTANEOUS at 17:45

## 2017-05-29 RX ADMIN — MIDAZOLAM HYDROCHLORIDE 2 MG: 1 INJECTION, SOLUTION INTRAMUSCULAR; INTRAVENOUS at 08:45

## 2017-05-29 RX ADMIN — GABAPENTIN 200 MG: 250 SOLUTION ORAL at 20:40

## 2017-05-29 RX ADMIN — Medication 50 MEQ: at 18:45

## 2017-05-29 RX ADMIN — SODIUM BICARBONATE 50 MEQ: 84 INJECTION, SOLUTION INTRAVENOUS at 17:17

## 2017-05-29 RX ADMIN — MIDAZOLAM HYDROCHLORIDE 2 MG: 1 INJECTION, SOLUTION INTRAMUSCULAR; INTRAVENOUS at 09:36

## 2017-05-29 RX ADMIN — VASOPRESSIN 2 UNITS/HR: 20 INJECTION, SOLUTION INTRAMUSCULAR; SUBCUTANEOUS at 18:07

## 2017-05-29 RX ADMIN — POTASSIUM CHLORIDE 20 MEQ: 29.8 INJECTION, SOLUTION INTRAVENOUS at 13:09

## 2017-05-29 RX ADMIN — MEROPENEM 1 G: 1 INJECTION, POWDER, FOR SOLUTION INTRAVENOUS at 14:00

## 2017-05-29 RX ADMIN — GABAPENTIN 200 MG: 250 SOLUTION ORAL at 15:28

## 2017-05-29 RX ADMIN — MIDAZOLAM HYDROCHLORIDE 3 MG/HR: 5 INJECTION, SOLUTION INTRAMUSCULAR; INTRAVENOUS at 13:00

## 2017-05-29 RX ADMIN — FOLIC ACID 1 MG: 1 TABLET ORAL at 14:45

## 2017-05-29 RX ADMIN — MINERAL OIL AND WHITE PETROLATUM: 150; 830 OINTMENT OPHTHALMIC at 23:39

## 2017-05-29 RX ADMIN — EPINEPHRINE 0.2 MG: 0.1 INJECTION, SOLUTION ENDOTRACHEAL; INTRACARDIAC; INTRAVENOUS at 08:28

## 2017-05-29 RX ADMIN — DEXTROSE MONOHYDRATE 20 ML: 25 INJECTION, SOLUTION INTRAVENOUS at 12:20

## 2017-05-29 RX ADMIN — MEROPENEM 1 G: 1 INJECTION, POWDER, FOR SOLUTION INTRAVENOUS at 20:40

## 2017-05-29 RX ADMIN — MIDAZOLAM HYDROCHLORIDE 2 MG: 1 INJECTION, SOLUTION INTRAMUSCULAR; INTRAVENOUS at 08:19

## 2017-05-29 RX ADMIN — SODIUM CHLORIDE 1000 ML: 9 INJECTION, SOLUTION INTRAVENOUS at 15:29

## 2017-05-29 RX ADMIN — MIDAZOLAM HYDROCHLORIDE 4 MG/HR: 5 INJECTION, SOLUTION INTRAMUSCULAR; INTRAVENOUS at 12:17

## 2017-05-29 RX ADMIN — Medication 20 ML: at 12:20

## 2017-05-29 RX ADMIN — CALCIUM GLUCONATE 1 G: 94 INJECTION, SOLUTION INTRAVENOUS at 13:58

## 2017-05-29 RX ADMIN — SODIUM BICARBONATE 50 MEQ: 84 INJECTION, SOLUTION INTRAVENOUS at 23:07

## 2017-05-29 RX ADMIN — VANCOMYCIN HYDROCHLORIDE 1500 MG: 10 INJECTION, POWDER, LYOPHILIZED, FOR SOLUTION INTRAVENOUS at 12:20

## 2017-05-29 RX ADMIN — SODIUM BICARBONATE 50 MEQ: 84 INJECTION, SOLUTION INTRAVENOUS at 23:08

## 2017-05-29 RX ADMIN — Medication 50 MEQ: at 17:17

## 2017-05-29 RX ADMIN — Medication 50 MEQ: at 23:07

## 2017-05-29 RX ADMIN — EPINEPHRINE 0.05 MCG/KG/MIN: 1 INJECTION PARENTERAL at 12:28

## 2017-05-29 RX ADMIN — EPINEPHRINE 0.07 MCG/KG/MIN: 1 INJECTION PARENTERAL at 23:10

## 2017-05-29 RX ADMIN — DOPAMINE HYDROCHLORIDE 5 MCG/KG/MIN: 160 INJECTION, SOLUTION INTRAVENOUS at 07:47

## 2017-05-29 RX ADMIN — HUMAN INSULIN 2 UNITS/HR: 100 INJECTION, SOLUTION SUBCUTANEOUS at 18:08

## 2017-05-29 RX ADMIN — CALCIUM CHLORIDE 1 G: 100 INJECTION INTRAVENOUS; INTRAVENTRICULAR at 20:17

## 2017-05-29 RX ADMIN — MIDAZOLAM HYDROCHLORIDE 2 MG: 1 INJECTION, SOLUTION INTRAMUSCULAR; INTRAVENOUS at 09:51

## 2017-05-29 RX ADMIN — FUROSEMIDE 40 MG: 10 INJECTION, SOLUTION INTRAVENOUS at 13:29

## 2017-05-29 RX ADMIN — PANTOPRAZOLE SODIUM 40 MG: 40 INJECTION, POWDER, FOR SOLUTION INTRAVENOUS at 12:18

## 2017-05-29 RX ADMIN — POTASSIUM CHLORIDE 20 MEQ: 29.8 INJECTION, SOLUTION INTRAVENOUS at 18:20

## 2017-05-29 RX ADMIN — MEPERIDINE HYDROCHLORIDE 25 MG: 25 INJECTION, SOLUTION INTRAMUSCULAR; INTRAVENOUS; SUBCUTANEOUS at 18:16

## 2017-05-29 RX ADMIN — FENTANYL CITRATE 100 MCG: 50 INJECTION, SOLUTION INTRAMUSCULAR; INTRAVENOUS at 09:35

## 2017-05-29 RX ADMIN — HUMAN ALBUMIN MICROSPHERES AND PERFLUTREN 5 ML: 10; .22 INJECTION, SOLUTION INTRAVENOUS at 15:00

## 2017-05-29 RX ADMIN — IOPAMIDOL 23 ML: 755 INJECTION, SOLUTION INTRAVASCULAR at 09:30

## 2017-05-29 RX ADMIN — OYSTER SHELL CALCIUM WITH VITAMIN D 2 TABLET: 500; 200 TABLET, FILM COATED ORAL at 14:45

## 2017-05-29 RX ADMIN — EPINEPHRINE 0.03 MCG/KG/MIN: 1 INJECTION PARENTERAL at 07:25

## 2017-05-29 RX ADMIN — SODIUM BICARBONATE 100 MEQ: 84 INJECTION INTRAVENOUS at 08:31

## 2017-05-29 RX ADMIN — SODIUM BICARBONATE 50 MEQ: 84 INJECTION INTRAVENOUS at 08:47

## 2017-05-29 NOTE — PROVIDER NOTIFICATION
Pt left radial pulse not dopplerable, change in status. Large, new blisters noted on upper left arm w/ increased tightness, edema. Dr. Frias saw pt at bedside, ordered UE US.

## 2017-05-29 NOTE — ED PROVIDER NOTES
History     Chief Complaint   Patient presents with     Cardiac Arrest     HPI  Amleia Michel is a 56 year old female with a history of VSD repair, hypertension, primary pulmonary hypertension and recent new onset atrial fibrillation (now on amiodarone) who presents to the emergency department by ambulance with possibly witnessed shockable rhythm cardiac arrest with ROSC.  Her  reports he woke up with large sound from her, and noted she was unresponsive. He started CPR and called 911, and medics noted she was in shockable rhythm on AED, and shock was given with ROSC. She was also given epinephrine 1 mg and ketamine prior to endotracheal intubation. Her initial blood pressure on arrival was 70 systolic, and she is still unresponsive, possibly due to ketamine. Her  came to bedside and also reported she was having nausea, vomiting, diarrhea that she attributed to amiodarone over the past couple days.     Past Medical History:   Diagnosis Date     HTN (hypertension)      Primary pulmonary hypertension (H)      Rheumatoid arthritis(714.0)        Past Surgical History:   Procedure Laterality Date     ANESTHESIA CARDIOVERSION N/A 5/17/2017    Procedure: ANESTHESIA CARDIOVERSION;  ANESTHESIA CARDIOVERSION;  Surgeon: GENERIC ANESTHESIA PROVIDER;  Location: [facility] OR     ARTHRODESIS WRIST  2000    Right wrist     FOOT SURGERY      4 left and 2 right     RELEASE CARPAL TUNNEL BILATERAL       REPAIR VENTRICULAR SEPTAL DEFECT  1969       Family History   Problem Relation Age of Onset     Breast Cancer Mother      Hypertension Mother      Alzheimer Disease Mother      Hypertension Father      Blood Disease Father      Lymphoa     Circulatory Father      A Fib     DIABETES Paternal Grandmother        Social History   Substance Use Topics     Smoking status: Never Smoker     Smokeless tobacco: Never Used     Alcohol use Yes      Comment: Glass of wine two evenings a night     Current Facility-Administered Medications  "  Medication     lidocaine 1 % 1 mL     lidocaine (LMX4) kit     sodium chloride (PF) 0.9% PF flush 3 mL     sodium chloride (PF) 0.9% PF flush 3 mL     0.9% sodium chloride infusion     aspirin EC EC tablet 325 mg     lidocaine 1 % 1 mL     lidocaine (LMX4) kit     sodium chloride (PF) 0.9% PF flush 3 mL     sodium chloride (PF) 0.9% PF flush 3 mL     HOLD:  Metformin and metformin containing medications if patient received IV contrast     naloxone (NARCAN) injection 0.1-0.4 mg     [START ON 5/30/2017] aspirin chewable tablet 81 mg        Allergies   Allergen Reactions     Penicillins      Tape [Adhesive Tape] Rash      I have reviewed the Medications, Allergies, Past Medical and Surgical History, and Social History in the Epic system.    Review of Systems   Unable to perform ROS: Intubated       Physical Exam   BP: (!) 138/98  Pulse: (!) 43  Heart Rate: 43  Temp: 98.3  F (36.8  C)  Resp: 12  Height: 147.3 cm (4' 10\")  Weight: 63.5 kg (140 lb)  SpO2: 90 %  Physical Exam   Constitutional: No distress.   HENT:   Head: Atraumatic.   Mouth/Throat: Oropharynx is clear and moist. No oropharyngeal exudate.   Eyes: Pupils are equal, round, and reactive to light. No scleral icterus.   Neck: Neck supple. No JVD present.   Cardiovascular: Regular rhythm, normal heart sounds and intact distal pulses.  Bradycardia present.  Exam reveals no gallop.    No murmur heard.  Pulmonary/Chest: No respiratory distress. She has no wheezes. She has rales. She exhibits no tenderness.   Abdominal: Soft. Bowel sounds are normal. She exhibits no distension and no mass. There is no tenderness. There is no rebound and no guarding.   Musculoskeletal: She exhibits edema. She exhibits no tenderness.   Neurological: She is unresponsive. GCS eye subscore is 1. GCS verbal subscore is 1. GCS motor subscore is 1.   Skin: Skin is warm. No rash noted. She is not diaphoretic.       ED Course     ED Course     Procedures             EKG Interpretation:  "     Interpreted by Fran Hernandez MD  Time reviewed: on arrival  Symptoms at time of EKG: unresponsive   Rhythm: wide complex shelton  Rate: Bradycardia  Axis: Right Axis Deviation  Ectopy: none  Conduction: right bundle branch block (complete)  ST Segments/ T Waves: ST Segment Depression Lateral  Q Waves: none  Comparison to prior: big change    Clinical Impression: wide complex bradycardia s/p cardiac arrest with ROSC                Critical Care time:  was 45 minutes for this patient excluding procedures.       Limited Bedside Cardiac Ultrasound, performed and interpreted by me.   Indication: Pulseless on exam/Cardiac arrest.  Parasternal long axis, parasternal short axis and apical 4 chamber views were acquired.   Image quality was satisfactory.    Findings:    Abnormal mod LV dysfunction but no pericardial effusion    IMPRESSION: Abnormal limited cardiac ultrasound showing mod LV dysfunction.  No pericardial effusion.          Results for orders placed or performed during the hospital encounter of 05/29/17 (from the past 24 hour(s))   Troponin POCT     Status: Abnormal    Collection Time: 05/29/17  7:03 AM   Result Value Ref Range    Troponin I 0.19 (HH) 0.00 - 0.10 ug/L   ISTAT gases elec ica gluc darian POCT     Status: Abnormal    Collection Time: 05/29/17  7:05 AM   Result Value Ref Range    Ph Venous 7.11 (LL) 7.32 - 7.43 pH    PCO2 Venous 56 (H) 40 - 50 mm Hg    PO2 Venous 24 (L) 25 - 47 mm Hg    Bicarbonate Venous 18 (L) 21 - 28 mmol/L    O2 Sat Venous 26 %    Sodium 130 (L) 133 - 144 mmol/L    Potassium 5.3 3.4 - 5.3 mmol/L    Glucose 192 (H) 70 - 99 mg/dL    Calcium Ionized 3.6 (L) 4.4 - 5.2 mg/dL    Hemoglobin 9.5 (L) 11.7 - 15.7 g/dL    Hematocrit - POCT 28 (L) 35.0 - 47.0 %PCV   ISTAT gases lactate darian POCT     Status: Abnormal    Collection Time: 05/29/17  7:05 AM   Result Value Ref Range    Ph Venous 7.10 (LL) 7.32 - 7.43 pH    PCO2 Venous 56 (H) 40 - 50 mm Hg    PO2 Venous 24 (L) 25 - 47 mm Hg     Bicarbonate Venous 18 (L) 21 - 28 mmol/L    O2 Sat Venous 26 %    Lactic Acid 6.1 (HH) 0.7 - 2.1 mmol/L   INR point of care     Status: Abnormal    Collection Time: 05/29/17  7:07 AM   Result Value Ref Range    INR Point of Care 2.6 (H) 0.86 - 1.14   CBC with platelets differential     Status: Abnormal    Collection Time: 05/29/17  7:20 AM   Result Value Ref Range    WBC 5.2 4.0 - 11.0 10e9/L    RBC Count 2.68 (L) 3.8 - 5.2 10e12/L    Hemoglobin 9.4 (L) 11.7 - 15.7 g/dL    Hematocrit 29.7 (L) 35.0 - 47.0 %     (H) 78 - 100 fl    MCH 35.1 (H) 26.5 - 33.0 pg    MCHC 31.6 31.5 - 36.5 g/dL    RDW 17.8 (H) 10.0 - 15.0 %    Platelet Count 104 (L) 150 - 450 10e9/L    Diff Method Manual Differential     % Neutrophils 77.8 %    % Lymphocytes 7.4 %    % Monocytes 9.3 %    % Eosinophils 0.0 %    % Basophils 0.9 %    % Metamyelocytes 1.8 %    % Myelocytes 2.8 %    Absolute Neutrophil 4.0 1.6 - 8.3 10e9/L    Absolute Lymphocytes 0.4 (L) 0.8 - 5.3 10e9/L    Absolute Monocytes 0.5 0.0 - 1.3 10e9/L    Absolute Eosinophils 0.0 0.0 - 0.7 10e9/L    Absolute Basophils 0.0 0.0 - 0.2 10e9/L    Absolute Metamyelocytes 0.1 (H) 0 10e9/L    Absolute Myelocytes 0.1 (H) 0 10e9/L    Anisocytosis Slight     Poikilocytosis Slight     [place] Cells Slight     Macrocytes Present     Platelet Estimate Confirming automated cell count    Lactic acid     Status: Abnormal    Collection Time: 05/29/17  7:20 AM   Result Value Ref Range    Lactic Acid 6.8 (HH) 0.7 - 2.1 mmol/L   Comprehensive metabolic panel     Status: Abnormal    Collection Time: 05/29/17  7:20 AM   Result Value Ref Range    Sodium 131 (L) 133 - 144 mmol/L    Potassium 5.2 3.4 - 5.3 mmol/L    Chloride 103 94 - 109 mmol/L    Carbon Dioxide 17 (L) 20 - 32 mmol/L    Anion Gap 11 3 - 14 mmol/L    Glucose 191 (H) 70 - 99 mg/dL    Urea Nitrogen 16 7 - 30 mg/dL    Creatinine 1.03 0.52 - 1.04 mg/dL    GFR Estimate 55 (L) >60 mL/min/1.7m2    GFR Estimate If Black 67 >60 mL/min/1.7m2     Calcium 7.1 (L) 8.5 - 10.1 mg/dL    Bilirubin Total 0.6 0.2 - 1.3 mg/dL    Albumin 1.8 (L) 3.4 - 5.0 g/dL    Protein Total 4.4 (L) 6.8 - 8.8 g/dL    Alkaline Phosphatase 95 40 - 150 U/L    ALT 48 0 - 50 U/L    AST  0 - 45 U/L     Canceled, Test credited   Unsatisfactory specimen - hemolyzed  NOTIFIED NATHAN CAST RN [id number] 5/29/17 BY DL     BNP     Status: Abnormal    Collection Time: 05/29/17  7:20 AM   Result Value Ref Range    N-Terminal Pro BNP Inpatient 2583 (H) 0 - 900 pg/mL   INR     Status: Abnormal    Collection Time: 05/29/17  7:20 AM   Result Value Ref Range    INR 2.31 (H) 0.86 - 1.14   Troponin I (now + 6 & 12 hrs)     Status: Abnormal    Collection Time: 05/29/17  7:20 AM   Result Value Ref Range    Troponin I ES 0.285 (HH) 0.000 - 0.045 ug/L   Chest  XR, 1 view portable     Status: None    Collection Time: 05/29/17  7:58 AM    Narrative    XR CHEST PORT 1 VW  5/29/2017 7:58 AM      HISTORY: intubation    COMPARISON: 5/23/2017    FINDINGS: Single portable AP supine view of the chest. ET tube with  tip approximately 5 cm above the ermelinda. Unchanged cardiomegaly.  Sternotomy wires are again seen. Mixed interstitial and alveolar  airspace opacities, increased since prior exam, despite of difference  in technique. Confluent opacity in the right lower lung, new since  prior exam.    Small right effusion and right basilar compressive atelectasis. No  pneumothorax.      Impression    IMPRESSION:   1. Interval endotracheal intubation.  2. New mixed interstitial and alveolar opacities, suggestive of severe  pulmonary edema.  3. Right basilar confluent airspace opacity, may represent  superimposed infection.  4. Small right pleural effusion and compressive atelectasis.    I have personally reviewed the examination and initial interpretation  and I agree with the findings.    HAIDER BRITO MD   Activated clotting time POCT     Status: Abnormal    Collection Time: 05/29/17  8:27 AM   Result Value Ref Range     Activated Clot Time 189 (H) 105 - 167 sec   ISTAT gases lactate art POCT     Status: Abnormal    Collection Time: 05/29/17  8:28 AM   Result Value Ref Range    pH Arterial 7.04 (LL) 7.35 - 7.45 pH    pCO2 Arterial 49 (H) 35 - 45 mm Hg    pO2 Arterial 55 (L) 80 - 105 mm Hg    Bicarbonate Arterial 13 (L) 21 - 28 mmol/L    O2 Sat Arterial 73 (L) 92 - 100 %    Lactic Acid 11.0 (HH) 0.7 - 2.1 mmol/L   ISTAT gases lactate art POCT     Status: Abnormal    Collection Time: 05/29/17  8:52 AM   Result Value Ref Range    pH Arterial 7.14 (L) 7.35 - 7.45 pH    pCO2 Arterial 37 35 - 45 mm Hg    pO2 Arterial 82 80 - 105 mm Hg    Bicarbonate Arterial 12 (L) 21 - 28 mmol/L    O2 Sat Arterial 92 92 - 100 %    Lactic Acid 10.2 (HH) 0.7 - 2.1 mmol/L   ISTAT gases lactate art POCT     Status: Abnormal    Collection Time: 05/29/17  9:12 AM   Result Value Ref Range    pH Arterial 7.19 (L) 7.35 - 7.45 pH    pCO2 Arterial 36 35 - 45 mm Hg    pO2 Arterial 88 80 - 105 mm Hg    Bicarbonate Arterial 14 (L) 21 - 28 mmol/L    O2 Sat Arterial 94 92 - 100 %    Lactic Acid 9.6 (HH) 0.7 - 2.1 mmol/L   Heart Cath Left heart cath     Status: None    Collection Time: 05/29/17  9:22 AM    Narrative    The results/report for this Cardiology exam is scanned in to [employer] and can   be found under the Media Tab in the Chart Review activity.         Glucose by meter     Status: None    Collection Time: 05/29/17 10:45 AM   Result Value Ref Range    Glucose 78 70 - 99 mg/dL   EKG 12-lead, tracing only     Status: None (Preliminary result)    Collection Time: 05/29/17 10:55 AM   Result Value Ref Range    Interpretation ECG Click View Image link to view waveform and result         Labs Ordered and Resulted from Time of ED Arrival Up to the Time of Departure from the ED   INR POINT OF CARE - Abnormal; Notable for the following:        Result Value    INR Point of Care 2.6 (*)     All other components within normal limits   LACTIC ACID WHOLE BLOOD - Abnormal; Notable  "for the following:     Lactic Acid 6.8 (*)     All other components within normal limits   ISTAT GASES ELEC ICA GLUC ALYSIA POCT - Abnormal; Notable for the following:     Ph Venous 7.11 (*)     PCO2 Venous 56 (*)     PO2 Venous 24 (*)     Bicarbonate Venous 18 (*)     Sodium 130 (*)     Glucose 192 (*)     Calcium Ionized 3.6 (*)     Hemoglobin 9.5 (*)     Hematocrit - POCT 28 (*)     All other components within normal limits   ISTAT  GASES LACTATE ALYSIA POCT - Abnormal; Notable for the following:     Ph Venous 7.10 (*)     PCO2 Venous 56 (*)     PO2 Venous 24 (*)     Bicarbonate Venous 18 (*)     Lactic Acid 6.1 (*)     All other components within normal limits   TROPONIN POCT - Abnormal; Notable for the following:     Troponin I 0.19 (*)     All other components within normal limits   BLOOD GAS ARTERIAL   ISTAT TROPONIN NURSING POCT   ISTAT CG4 GASES LACTATE ALYSIA NURSING POCT            Assessments & Plan (with Medical Decision Making)  Cardiac arrest ?witnessed by her  who woke up with large noise from her, started CPR and called 911, Medics noted \"shockable rhythm\" on AED and shocked with ROCS, not able to evaluate her MS since pt was given ketamin prior to ET intubation by them, on arrival wide complex shelton with tenuous BP, ETCO2 high 20's, requied more Epi then gtt, also started Dopamin per Cards Fellow, POCUS as above, Cath lab activated immediately after discussed with the case with Dr Ca, lactae 6 and elevated trop. Transfer to Cath lab in cirtical condition, the  at bedside and informed the update as well. POCUS also noted to have many B lines and CXR, ETT consistant with Pulmonary Edema suggestive for cardiogenic shock.       I have reviewed the nursing notes.    I have reviewed the findings, diagnosis, plan and need for follow up with the patient.    Current Discharge Medication List          Final diagnoses:   Cardiac arrest with ventricular fibrillation (H)   Cardiogenic shock (H) "       5/29/2017   [facility], [place],  HEART CATH LAB     Fran Hernandez MD  05/29/17 1111

## 2017-05-29 NOTE — ED NOTES
"55 yo female arrived via [facility] after her spouse found her laying next to the bed with agonal breathing. Spouse began CPR immediately. EMS care - shock x 1, epi x1, and CPR. Per EMS pt given ketamine and succs for intubation and \"2ml epi push\" for low BP. Unknown original rhythm. Pt with history of atrial fib and recently discharged from [facility].   "

## 2017-05-29 NOTE — IP AVS SNAPSHOT
MRN:[id number]                      After Visit Summary   5/29/2017    Amelia Michel    MRN: [id number]           Thank you!     Thank you for choosing [facility] for your care. Our goal is always to provide you with excellent care. Hearing back from our patients is one way we can continue to improve our services. Please take a few minutes to complete the written survey that you may receive in the mail after you visit with us. Thank you!        Patient Information     Date Of Birth          1960        About your hospital stay     You were admitted on:  May 29, 2017 You last received care in the:  [address] [facility]    You were discharged on:  June 16, 2017        Reason for your hospital stay       Cardiac Arrest                  Who to Call     For medical emergencies, please call 911.  For non-urgent questions about your medical care, please call your primary care provider or clinic, [phone]          Attending Provider     Provider Specialty    Fran Hernandez MD Internal Medicine    Sundar Wood MD Cardiology       Primary Care Provider Office Phone # Fax #    Comfort Sabillon [phone]      After Care Instructions     Activity - Ambulate in hallway       Every shift            Activity - Up ad emelia           Activity - Up with assistive device           Activity - Up with nursing assistance           Additional Discharge Instructions       Follow-up with electrophysiology to schedule ICD placement in approximately 2-3 weeks or once left upper arm healed.            Adult Formula Bolus Feeding       Specify: Tube Feedings as specified in Discharge Summary.            Advance Diet as Tolerated       Follow this diet upon discharge: Dysphagia Level 3 with thin liquids. Straws ok but patient must be sitting upright for meals.            Daily weights       Call Provider for weight gain of more than 2 pounds per day or 5 pounds per week.            Fall  precautions           General info for SNF       Length of Stay Estimate: Short Term Care: Estimated # of Days <30  Condition at Discharge: Stable  Level of care: Skilled   Rehabilitation Potential: Good  Admission H&P remains valid and up-to-date: Yes  Recent Chemotherapy: N/A  Use Nursing Home Standing Orders: Yes            Intake and output       Every shift            Mantoux instructions       Give two-step Mantoux (PPD) Per Facility Policy Yes            Supplies       [employer] Vest   Phone:[phone]   Fax: [phone]    For home life vest.            Weight bearing status       Full            Wound care (specify)       Site:   Left upper arm  Instructions:  Wound care dressing changes per nursing                  Follow-up Appointments     Follow Up and recommended labs and tests       Follow up with Nursing home physician. No follow up labs or test are needed.                  Your next 10 appointments already scheduled     Jun 20, 2017  1:30 PM CDT   (Arrive by 1:15 PM)   Implanted Defibulator with Uc Cv Device 1   [facility] ([facility])    [address] [place] [id number] [phone]            Jul 21, 2017  8:30 AM CDT   (Arrive by 8:15 AM)   New Patient Visit with Pj Cunha MD   [facility] ([facility])    [address] [place] [id number] [phone]            Sep 27, 2017  1:00 PM CDT   (Arrive by 12:45 PM)   Implanted Defibulator with Uc Cv Device 1   [facility] ([facility])    [address] [place] [id number] [phone]            Sep 27, 2017  1:30 PM CDT   (Arrive by 1:15 PM)   RETURN ARRHYTHMIA with Juan Eaton MD   [facility] ([facility])    [address] [place] [id number] [phone]              Additional Services      Medication Therapy Management Referral       Reason for referral:  on more than 10 medications and hospitalized or in the ED in the past 6 months     This service is designed to help you get the most from your medications.  A specially trained pharmacist will work closely with you and your doctors  to solve any problems related to your medications and to help you get the   best results from taking them.      The Medication Therapy Management staff will call you to schedule an appointment.            Occupational Therapy Adult Consult       Evaluate and treat as clinically indicated.    Reason:  Severe critical illness myopathy            Physical Therapy Adult Consult       Evaluate and treat as clinically indicated.    Reason:  Severe critical illness myopathy            Speech Language Path Adult Consult       Evaluate and treat as clinically indicated.    Reason:  Severe critical illness myopathy and difficulty swallowing post-extubation                  Future tests that were ordered for you     Outpatient Coronary Angiography Adult Order                 Further instructions from your care team         About Arrhythmias    Electrical impulses cause the normal heart to beat 60 to 100 times a minute while at rest. These impulses come from a natural pacemaker deep inside the heart muscle. Each impulse causes the heart muscle to contract. This causes the blood to flow through the heart and out to the tissues and organs of your body.  An arrhythmia is a change from the normal speed or pattern of these electrical impulses. This can cause the heart to beat too fast (tachycardia); or too slow (bradycardia); or in an unsteady pattern (irregular rhythm).  Symptoms of arrhythmias  Different people experience arrhythmias differently. Sometimes they may not have symptoms, but just notice a change in their pulse. Symptoms can include:    Fluttering feeling in the chest    Shortness of breath    Chest pain or pressure    Neck  fullness    Lightheadedness or dizziness    Fainting or almost fainting    Palpitations (the sense that your heart is fluttering or beating fast or hard or irregularly)    Tiredness, fatigue, or weakness    Cardiac arrest  Causes of arrhythmias  Arrhythmias are most often due to heart disease such as:    Coronary artery disease    Heart valve disease    Enlarged heart    High blood pressure    Heart failure  Other causes of  arrhythmia include:    Certain medicines (such as asthma inhalers and decongestants)    Some herbal supplements    Cardiac stimulant drugs (such as cocaine, amphetamine, diet pills, certain decongestant cold medicines, caffeine, and nicotine)    Excessive alcohol use    Anxiety and panic disorder    Thyroid disease    Anemia    Diabetes    Sleep apnea    Obesity    Congenital heart disease    Cardiac genetic diseases  Arrhythmias can often be prevented. The cause and type of arrhythmia determines the best treatment. Sometimes your doctor may want to monitor your heart rate over a 24-hour period or longer. This can help identify the cause of your arrhythmia and find the best treatment. This can be done with a Holter monitor, a portable EKG recording device attached by wires to your chest. Or you may get an event monitor, which you can place over the skin in front of your heart to record heart rhythms. You can carry this with you as you go about your routine activities during the monitoring period. Implantable loop recorders may also be used to monitor the heart rhythm for up to 2 years. This miniature device is placed underneath the skin overlying the heart.  Home care  The following guidelines will help you care for yourself at home:    Avoid cardiac stimulants (such as cocaine, amphetamine, diet pills, certain decongestant cold medicines, caffeine, and nicotine).    If you smoke, stop smoking. Contact your doctor or a local stop-smoking program for help.    Tell your doctor about any  "prescription, over-the-counter, or herbal medicines you take. These may be affecting your heart rhythm.  Follow-up care  Follow up with your healthcare provider, or as advised. If a Holter monitor has been recommended, contact the cardiologist you have been referred to as soon as you can  the device. Other outpatient tests may also be arranged for you at that time.  Call 911  This is the fastest and safest way to get to the emergency department. The paramedics can also start treatment on the way to the hospital, if needed.  Don't wait until your symptoms are severe to call 911. Other reasons to call 911 besides chest pain include:    Chest, shoulder, arm, neck, or back pain    Shortness of breath    Feeling lightheaded, faint, or dizzy    Unexplained fainting    Rapid heart beat    Slower than usual heart rate compared to your normal    Very irregular heartbeat    Chest pain (angina) with weakness, dizziness, heavy sweating, nausea, or vomiting    Extreme drowsiness, or confusion    Weakness of an arm or leg or one side of the face    Difficulty with speech or vision  When to seek medical advice  Remember, things are not always like they are on TV. Sometimes it is not so obvious. You may only feel weak or just \"not right.\" If it is not clear or if you have any doubt, call for advice.    Seek help for chest pain, or it feels different from usual, even if your symptoms are mild.    Don't drive yourself. Have someone else drive. If no one can drive you, call 911.    If your doctor has given you medicines to take when you have symptoms, take them, but do not delay getting help while trying to find them.  Date Last Reviewed: 4/25/2016 2000-2017 [employer]. [address] [place] [address], [place] [address]. All rights reserved. This information is not intended as a substitute for professional medical care. Always follow your healthcare professional's instructions.          Pending Results     Date and " "Time Order Name Status Description    6/16/2017 0609 WBC Differential In process     6/15/2017 1542 Surgical Path Exam In process     5/31/2017 1012 s100 B: Laboratory Miscellaneous Order In process     5/29/2017 1046 Adalimumab Activity and Neutralizing Antibodies: Laboratory Miscellaneous Order In process             Statement of Approval     Ordered          06/16/17 1032  I have reviewed and agree with all the recommendations and orders detailed in this document.  EFFECTIVE NOW     Approved and electronically signed by:  Sundar Wood MD             Admission Information     Date & Time Provider Department Dept. Phone    5/29/2017 Sundar Wood MD [address] [facility] [place] [phone]      Your Vitals Were     Blood Pressure Pulse Temperature Respirations Height Weight    127/68 (BP Location: Right arm) 141 98.2  F (36.8  C) (Oral) 18 1.473 m (4' 10\") 72.8 kg (160 lb 8 oz)    Pulse Oximetry BMI (Body Mass Index)                97% 33.54 kg/m2          [employer] Information     [employer] gives you secure access to your electronic health record. If you see a primary care provider, you can also send messages to your care team and make appointments. If you have questions, please call your primary care clinic.  If you do not have a primary care provider, please call [phone] and they will assist you.        Care EveryWhere ID     This is your Care EveryWhere ID. This could be used by other organizations to access your [facility] medical records  [id number]           Review of your medicines      START taking        Dose / Directions    acetaminophen 325 MG tablet   Commonly known as:  TYLENOL   Used for:  Rheumatoid arthritis involving multiple sites with positive rheumatoid factor (H)        Dose:  650 mg   Take 2 tablets (650 mg) by mouth every 4 hours as needed for mild pain   Quantity:  100 tablet   Refills:  0       atenolol 50 MG tablet   Commonly known as:  TENORMIN   Used for:  Paroxysmal " atrial fibrillation (H), Atrial tachycardia (H)        Dose:  50 mg   Take 1 tablet (50 mg) by mouth daily   Quantity:  30 tablet   Refills:  0       cetirizine 10 MG tablet   Commonly known as:  zyrTEC   Used for:  Rash and nonspecific skin eruption        Dose:  10 mg   Take 1 tablet (10 mg) by mouth daily   Quantity:  30 tablet   Refills:  0       digoxin 250 MCG tablet   Commonly known as:  LANOXIN   Used for:  Atrial tachycardia (H), Paroxysmal atrial fibrillation (H)        Dose:  250 mcg   Take 1 tablet (250 mcg) by mouth daily   Quantity:  30 tablet   Refills:  0       melatonin 3 MG tablet   Used for:  Insomnia, unspecified type        Dose:  3 mg   Take 1 tablet (3 mg) by mouth nightly as needed for sleep   Refills:  0       senna-docusate 8.6-50 MG per tablet   Commonly known as:  SENOKOT-S;PERICOLACE   Used for:  Constipation, unspecified constipation type        Dose:  1-2 tablet   Take 1-2 tablets by mouth 2 times daily   Quantity:  100 tablet   Refills:  0       triamcinolone 0.1 % ointment   Commonly known as:  KENALOG   Used for:  Rash and nonspecific skin eruption        Apply topically 2 times daily   Refills:  0         CONTINUE these medicines which may have CHANGED, or have new prescriptions. If we are uncertain of the size of tablets/capsules you have at home, strength may be listed as something that might have changed.        Dose / Directions    apixaban ANTICOAGULANT 2.5 MG tablet   Commonly known as:  ELIQUIS   This may have changed:    - medication strength  - how much to take   Used for:  Paroxysmal atrial fibrillation (H)        Dose:  2.5 mg   Take 1 tablet (2.5 mg) by mouth 2 times daily   Refills:  0       gabapentin 100 MG capsule   Commonly known as:  NEURONTIN   This may have changed:    - when to take this  - additional instructions        Dose:  200 mg   Take 2 capsules (200 mg) by mouth 3 times daily   Quantity:  180 capsule   Refills:  3       predniSONE 10 MG tablet    Commonly known as:  DELTASONE   This may have changed:    - medication strength  - how much to take  - additional instructions   Used for:  Rheumatoid arthritis involving multiple sites with positive rheumatoid factor (H)        Dose:  10 mg   Take 1 tablet (10 mg) by mouth daily   Refills:  0         CONTINUE these medicines which have NOT CHANGED        Dose / Directions    Calcium Carb-Cholecalciferol 600-800 MG-UNIT Tabs        Dose:  2 tablet   Take 2 tablets by mouth every morning   Refills:  0       COQ-10 PO        Dose:  1 tablet   Take 1 tablet by mouth daily In the morning   Refills:  0       folic acid 1 MG tablet   Commonly known as:  FOLVITE        Dose:  1 mg   Take 1 mg by mouth daily.   Refills:  0       traMADol 50 MG tablet   Commonly known as:  ULTRAM        Dose:  50 mg   Take 50 mg by mouth Every 6 hours as needed for pain   Refills:  0       WOMENS MULTIVITAMIN PO        Dose:  1 tablet   Take 1 tablet by mouth daily At bedtime   Refills:  0         STOP taking     amiodarone 200 MG tablet   Commonly known as:  PACERONE/CODARONE           amiodarone HCl 400 MG Tabs           ARAVA 20 MG tablet   Generic drug:  leflunomide           diltiazem 120 MG 24 hr capsule           diphenoxylate-atropine 2.5-0.025 MG per tablet   Commonly known as:  LOMOTIL           methotrexate 2.5 MG tablet CHEMO           metoprolol 100 MG 24 hr tablet   Commonly known as:  TOPROL-XL           nitrofurantoin (macrocrystal-monohydrate) 100 MG capsule   Commonly known as:  MACROBID           PLAQUENIL 200 MG tablet   Generic drug:  hydroxychloroquine           pramipexole 0.25 MG tablet   Commonly known as:  MIRAPEX                Where to get your medicines      Some of these will need a paper prescription and others can be bought over the counter. Ask your nurse if you have questions.     You don't need a prescription for these medications     acetaminophen 325 MG tablet    apixaban ANTICOAGULANT 2.5 MG tablet     atenolol 50 MG tablet    cetirizine 10 MG tablet    digoxin 250 MCG tablet    melatonin 3 MG tablet    predniSONE 10 MG tablet    senna-docusate 8.6-50 MG per tablet    triamcinolone 0.1 % ointment                Protect others around you: Learn how to safely use, store and throw away your medicines at www.disposemymeds.org.             Medication List: This is a list of all your medications and when to take them. Check marks below indicate your daily home schedule. Keep this list as a reference.      Medications           Morning Afternoon Evening Bedtime As Needed    acetaminophen 325 MG tablet   Commonly known as:  TYLENOL   Take 2 tablets (650 mg) by mouth every 4 hours as needed for mild pain   Last time this was given:  650 mg on 6/16/2017  7:52 AM                                   apixaban ANTICOAGULANT 2.5 MG tablet   Commonly known as:  ELIQUIS   Take 1 tablet (2.5 mg) by mouth 2 times daily   Last time this was given:  2.5 mg on 6/16/2017  7:48 AM                                      atenolol 50 MG tablet   Commonly known as:  TENORMIN   Take 1 tablet (50 mg) by mouth daily   Last time this was given:  50 mg on 6/16/2017  5:36 AM                                   Calcium Carb-Cholecalciferol 600-800 MG-UNIT Tabs   Take 2 tablets by mouth every morning                                   cetirizine 10 MG tablet   Commonly known as:  zyrTEC   Take 1 tablet (10 mg) by mouth daily   Last time this was given:  10 mg on 6/16/2017  9:12 AM                                   COQ-10 PO   Take 1 tablet by mouth daily In the morning                                digoxin 250 MCG tablet   Commonly known as:  LANOXIN   Take 1 tablet (250 mcg) by mouth daily   Last time this was given:  250 mcg on 6/16/2017  5:36 AM                                   folic acid 1 MG tablet   Commonly known as:  FOLVITE   Take 1 mg by mouth daily.   Last time this was given:  1 mg on 6/16/2017  7:48 AM                                    gabapentin 100 MG capsule   Commonly known as:  NEURONTIN   Take 2 capsules (200 mg) by mouth 3 times daily   Last time this was given:  200 mg on 6/16/2017  7:48 AM                                         melatonin 3 MG tablet   Take 1 tablet (3 mg) by mouth nightly as needed for sleep   Last time this was given:  3 mg on 6/4/2017 10:39 PM                                   predniSONE 10 MG tablet   Commonly known as:  DELTASONE   Take 1 tablet (10 mg) by mouth daily   Last time this was given:  10 mg on 6/16/2017  7:47 AM                                   senna-docusate 8.6-50 MG per tablet   Commonly known as:  SENOKOT-S;PERICOLACE   Take 1-2 tablets by mouth 2 times daily   Last time this was given:  2 tablets on 6/9/2017 10:28 AM                                   traMADol 50 MG tablet   Commonly known as:  ULTRAM   Take 50 mg by mouth Every 6 hours as needed for pain                                   triamcinolone 0.1 % ointment   Commonly known as:  KENALOG   Apply topically 2 times daily   Last time this was given:  6/16/2017  7:53 AM                                      WOMENS MULTIVITAMIN PO   Take 1 tablet by mouth daily At bedtime   Last time this was given:  1 tablet on 6/16/2017  7:47 AM

## 2017-05-29 NOTE — MR AVS SNAPSHOT
After Visit Summary   5/29/2017    Amelia Michel    MRN: [id number]           Patient Information     Date Of Birth          1960        Visit Information        Provider Department      5/29/2017 1:00 PM [facility] EEG TECH 4 [facility]        Today's Diagnoses     Cardiogenic shock (H)    -  1       Follow-ups after your visit        Your next 10 appointments already scheduled     May 30, 2017  7:00 AM CDT   24 Hour Video Visit with [facility] EEG TECH 4   [facility] ([facility])    [facility]  [address] [address]   [phone]           [place]: Your appointment is scheduled at [facility]. [address] [place] [address]            May 31, 2017  1:30 PM CDT   (Arrive by 1:15 PM)   RETURN ATRIAL FIBULATION VISIT with Juan Eaton MD   [facility] ([facility])    [address] [address] [phone]            Jun 01, 2017  2:00 PM CDT   SHORT with Comfort Sabillon MD   [facility] ([facility])    [address] [address] [phone]              Future tests that were ordered for you today     Open Standing Orders        Priority Remaining Interval Expires Ordered    EKG 12-lead, tracing only STAT 100/100 CONDITIONAL (SPECIFY)  5/29/2017    Oxygen: Nasal cannula Routine 94556/45087 PRN  5/29/2017    EKG 12-lead, tracing only Routine 1/1 CONDITIONAL X 1  5/29/2017    Smoking Cessation Program IP Consult Routine 1/1 CONDITIONAL X 1  5/29/2017    Oxygen: Nasal cannula Routine 81192/44737 CONTINUOUS  5/29/2017    EKG 12-lead, tracing only Routine 7/7 DAILY  5/29/2017    Potassium Routine 1/1 AM DRAW  5/29/2017    Magnesium Routine 100/100 CONDITIONAL (SPECIFY)  5/29/2017    Phosphorus Routine 100/100 CONDITIONAL (SPECIFY)  5/29/2017    CRP inflammation Routine 100/100 DAILY  5/29/2017     Erythrocyte sedimentation rate auto Routine 100/100 DAILY  5/29/2017    Hemoglobin plasma Routine 100/100 DAILY  5/29/2017    Heparin 10a Level Routine 100/100 DAILY  5/29/2017    Lactate Dehydrogenase Routine 100/100 DAILY  5/29/2017    Magnesium Routine 100/100 DAILY  5/29/2017    Phosphorus Routine 100/100 DAILY  5/29/2017    Sputum Culture Aerobic Bacterial Routine 100/100 DAILY  5/29/2017    Glucose whole blood Timed 99/100 EVERY 6 HOURS  5/29/2017    Heparin 10a Level Timed 99/100 EVERY 6 HOURS  5/29/2017    INR Timed 99/100 EVERY 6 HOURS  5/29/2017    Partial thromboplastin time Timed 99/100 EVERY 6 HOURS  5/29/2017    Lactic acid whole blood Timed 99/100 EVERY 6 HOURS  5/29/2017    Magnesium Timed 99/100 EVERY 6 HOURS  5/29/2017    Troponin I Timed 99/100 EVERY 6 HOURS  5/29/2017    D dimer quantitative Timed 19/20 EVERY 12 HOURS  5/29/2017    Fibrinogen activity Timed 19/20 EVERY 12 HOURS  5/29/2017    Antithrombin III Routine 100/100 DAILY  5/29/2017    Blood culture Routine 100/100 DAILY  5/29/2017    Calcium ionized whole blood Routine 100/100 DAILY  5/29/2017    XR Chest Port 1 View Routine 6/7 DAILY IMAGING  5/29/2017    US Lower Extremity Arterial Duplex Bilateral Routine 100/100 CONDITIONAL (SPECIFY)  5/29/2017    Blood gas arterial and oxyhgb Timed 181/186 EVERY 2 HOURS  5/29/2017    CBC with platelets Timed 99/100 EVERY 6 HOURS  5/29/2017    Comprehensive metabolic panel Timed 99/100 EVERY 6 HOURS  5/29/2017    Calcium ionized whole blood Timed 99/100 EVERY 6 HOURS  5/29/2017    Blood gas ELS venous Timed 19/20 EVERY 12 HOURS  5/29/2017    Blood gas ELS arterial Timed 19/20 EVERY 12 HOURS  5/29/2017    ISTAT ACT nursing POCT Routine 50/50 PRN  5/29/2017    EKG 12-lead, tracing only Routine 1/1 CONDITIONAL X 1  5/29/2017    Notify Provider Routine [id number] PRN  5/29/2017          Open Future Orders        Priority Expected Expires Ordered    Outpatient Coronary Angiography Adult Order Routine  5/29/2017 8/17/2017 5/29/2017            Who to contact     Please call your clinic at [phone] to:    Ask questions about your health    Make or cancel appointments    Discuss your medicines    Learn about your test results    Speak to your doctor   If you have compliments or concerns about an experience at your clinic, or if you wish to file a complaint, please contact [facility] Patient Relations at [phone] or email us at [email or web address]         Additional Information About Your Visit        obopayhart Information     [employer] gives you secure access to your electronic health record. If you see a primary care provider, you can also send messages to your care team and make appointments. If you have questions, please call your primary care clinic.  If you do not have a primary care provider, please call [phone] and they will assist you.      [employer] is an electronic gateway that provides easy, online access to your medical records. With [employer], you can request a clinic appointment, read your test results, renew a prescription or communicate with your care team.     To access your existing account, please contact your [facility] or call [phone] for assistance.        Care EveryWhere ID     This is your Care EveryWhere ID. This could be used by other organizations to access your [facility] medical records  [id number]         Blood Pressure from Last 3 Encounters:   05/29/17 (!) 75/61   05/23/17 125/53   04/04/17 104/78    Weight from Last 3 Encounters:   05/29/17 63.5 kg (140 lb)   05/23/17 64.4 kg (141 lb 15.6 oz)   04/04/17 61.6 kg (135 lb 12.8 oz)              We Performed the Following     Glucose by meter          Today's Medication Changes      Notice     This visit is during an admission. Changes to the med list made in this visit will be reflected in the After Visit Summary of the admission.             Primary Care  Provider Office Phone # Fax #    Comfort Sabillon [phone]       [facility] [address] [address]        Thank you!     Thank you for choosing [facility]  for your care. Our goal is always to provide you with excellent care. Hearing back from our patients is one way we can continue to improve our services. Please take a few minutes to complete the written survey that you may receive in the mail after your visit with us. Thank you!             Your Updated Medication List - Protect others around you: Learn how to safely use, store and throw away your medicines at www.disposemymeds.org.      Notice     This visit is during an admission. Changes to the med list made in this visit will be reflected in the After Visit Summary of the admission.

## 2017-05-29 NOTE — PROCEDURES
PRELIMINARY CARDIAC CATH REPORT:     PROCEDURES PERFORMED:   1. Selective left coronary angiography.  2. Temporary pacemaker insertion.  3. Cooling catheter insertion.  4. Advanced cardiac life support resuscitation.  5. Sedation directed by the interventional cardiologist for total time of 60 minutes.    PHYSICIANS:  1. Attending Interventional Cardiology Staff: Robinson Wood MD  2. Interventional Cardiology Fellow: Ankit Mcintosh MD     INDICATION:  Amelia Michel is a 56 year old female with remote VSD repair and recent atrial arrhythmia hospitalization who presents on an emergent outpatient basis for coronary angiography to evaluate a cardiac arrest with ROSC.  She presented to the cath lab intubated with vasopressor support of epinephrine and dopamine.    DESCRIPTION:  1. No consent obtained given emergency.  2. Sterile prep and procedure.  3. Location: right radial artery, right common femoral artery, right common femoral vein and left common femoral vein.  Unable to advance wire into RRA so abandoned access.  4. Access: Local anesthetic with lidocaine.  A standard (18 g) needle with ultrasound guidance was used to establish vascular access using a modified Seldinger technique.  5. Sheaths: 4Fr long sheath in RCFA, 8Fr in RCFV, 9Fr in LCFV  6. Catheters: 4Fr 3DRC, 4Fr JL-4, 9Fr cooling catheter.  7. Fluoroscopy time of 4.6 min.  8. Estimated blood loss of <5 mL.  9. See below for procedure details.    MEDICATIONS:  1. Contrast 23 mL IV.  2. Conscious sedation with midazolam 6 mg for total of 60  minutes as directed by the attending cardiologist.  3. Epinephrine IV.  4. Dopamine IV.  5. Bicarbonate IV.    HEMODYNAMICS:  1. HR 58 bpm  2. Initial Ao BP 57/36/46 mmHg on Epi 0.15 mcg/kg/min and Dopa 5 mcg/kg/min    CORONARY ANGIOGRAM:   1. Both coronary arteries arise from their respective cusps.  2. Right dominant.  3. LM is without angiographic evidence of disease.   4. LAD supplies the apex along with the  RCA (type 2 LAD) and gives rise to septal perforators and diagonal branches, all without angiographic evidence of disease.      5. LCX gives rise to OM1 and OM2 vessels, all without angiographic evidence of disease.    6. RCA was not studied.      TEMPORARY PACEMAKER:  A 5Fr PACEL temporary pacemaker was positioned in the right atrium and a back-up rate of 80 bpm was programmed and tested for adequate capture.    COOLING CATHETER INSERTION:  1. A 9Fr Thermagaurd cooling catheter was inserted into the LCFV.    ADVANCED CARDIAC LIFE SUPPORT:  1. ACLS medications were continuously titrated throughout the procedure to treat her state of shock.    COMPLICATIONS:  1. None    SUMMARY:   1. Cardiac arrest from unclear etiology, but no evidence to suggest coronary artery disease as the etiology.  2. Sinus arrest requiring temporary pacemaker insertion into the right atrium.  3. No angiographic evidence of coronary artery disease.  4. Cooling catheter insertion.    PLAN:   1. Admit for further evaluation and management.  2. Therapeutic hypothermia.     The attending interventional cardiologist was present for the entire procedure.    See CVIS report for final draft.    Ankit Mcintosh M.D.  Structural Heart Disease &   Advanced Interventional Cardiology Fellow  Pager [phone]

## 2017-05-29 NOTE — ED NOTES
MD at bedside. Cardiac echo intermittent. End tital 26. titrating epi drip per MD order.echo in place [facility] at bedside for 3rd IV access. ABG drawn.

## 2017-05-29 NOTE — PHARMACY-VANCOMYCIN DOSING SERVICE
Pharmacy Vancomycin Initial Note  Date of Service May 29, 2017  Patient's  1960  56 year old, female    Indication: Healthcare-Associated Pneumonia    Current estimated CrCl = Estimated Creatinine Clearance: 61.1 mL/min (based on Cr of 1.03).    Creatinine for last 3 days  2017:  7:20 AM Creatinine 1.03 mg/dL    Recent Vancomycin Level(s) for last 3 days  No results found for requested labs within last 72 hours.      Vancomycin IV Administrations (past 72 hours)      No vancomycin orders with administrations in past 72 hours.                Nephrotoxins and other renal medications (Future)    Start     Dose/Rate Route Frequency Ordered Stop    17 0530  vancomycin (VANCOCIN) 1,250 mg in NaCl 0.9 % 250 mL intermittent infusion      1,250 mg Intravenous EVERY 18 HOURS 17 1119      17 1130  vancomycin (VANCOCIN) 1,500 mg in NaCl 0.9 % 250 mL intermittent infusion      1,500 mg Intravenous ONCE 17 1119            Contrast Orders - past 72 hours (72h ago through future)    Start     Dose/Rate Route Frequency Ordered Stop    17 0930  iopamidol (ISOVUE-370) solution 200 mL      200 mL INTRA-ARTERIAL ONCE 17 0923 17 0930                Plan:  1.  Start vancomycin  1500 mg IV x1 then 1250 mg IV q05h.   2.  Goal Trough Level: 15-20 mg/L   3.  Pharmacy will check trough levels as appropriate in 1-3 Days.    4. Serum creatinine levels will be ordered daily for the first week of therapy and at least twice weekly for subsequent weeks.    5. Spencer method utilized to dose vancomycin therapy: Method 2    Cammy Llanos, PharmD  Pager [phone]

## 2017-05-29 NOTE — SIGNIFICANT EVENT
SPIRITUAL HEALTH SERVICES Significant Event  [employer] of ANOINTING  [facility] 4E    Pt anointed by Father Dirk Mancini, [facility]  , per request of .    Father Dirk Rosales

## 2017-05-29 NOTE — IP AVS SNAPSHOT
[address] [address]    Phone:  [phone]                                       After Visit Summary   5/29/2017    Amelia Michel    MRN: [id number]           After Visit Summary Signature Page     I have received my discharge instructions, and my questions have been answered. I have discussed any challenges I see with this plan with the nurse or doctor.    ..........................................................................................................................................  Patient/Patient Representative Signature      ..........................................................................................................................................  Patient Representative Print Name and Relationship to Patient    ..................................................               ................................................  Date                                            Time    ..........................................................................................................................................  Reviewed by Signature/Title    ...................................................              ..............................................  Date                                                            Time

## 2017-05-29 NOTE — PROGRESS NOTES
"SPIRITUAL HEALTH SERVICES   [facility] CathLab   ON-CALL VISIT   REFERRAL SOURCE: request from pt's    Wolf described his wife's \"heart attack\" and \"death rattle\" on waking this morning in response to which he applied CPR - he has been a respiratory therapist, she a nurse.  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  He reviewed her medical narrative. He stated that he has contacted some family about her situation.  At his request, we prayed and arranged for her to receive sacrament of the sick.    PLAN: Will request a  come for anointing and will check in with pt's  again later in the morning.     GARO Jarquin.  Staff   Pager [phone]    "

## 2017-05-29 NOTE — H&P
Cardiology Consult Note    Reason for Admission: Cardiac Arrest     HPI: Amelia Michel is a 56 year old with hx VSD s/p surgical repair (1969), RA, HTN and recently dx Afib/flutter/SVT who is presenting after a cardiac arrest.  reports waking up to patient gasping for air. He tried to wake her up shaking the patient, but she was unresponsive. At this time he moved her off the bed and started chest compression. While calling EMS. On EMS arrival, patient was noted to be in a shockable rhythm and shocked. Patient also received 1-2 rounds of epi with CPR (unclear when the epi was given in relation to the shock). ROSC was obtained on the field. Patient also intubated on field. On arrival patient was noted to be hypotensive. She was place on an epi gtt and given several sounds of bi carb prior to transfer to cath lab. Coronary angiogram showed normal coronaries.     Presenting ECG showed ventricular escape rhythm.   Per  patient with nausea, vomiting and diarrhea the days prior to arrest.     Of note, patient was recently hospitalize on the cards 1 service for afib with RVR which was difficult to control. Rates were elevated despite being metoprolol 100Xl and Diltiazem. She had 1 DCCV for which she reverted within 24hrs. Sotolol was also attempted but patient did not tolerate the medication due to nausea and vomiting. Prior to discharge patient was started on amiodarone. Patient received an intial IV load followed by 400mg BID to complete the load. Patient also started on apixaban during this hospitalization. Patient had no evidence of ventriclar arrhythmia prior to discharge. RFA was discuss but was defered as patient had a UTI at the time with ESBL.     Given difficulty to control rates, an CMR was performed to r/o inflammation or scar as cause of arrhythmia. CMR did not show atrial inflammation or abnormalities except for enlargement. 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     Unable to obtained ROS given patient mental status.     PAST MEDICAL HISTORY:  Past Medical History:   Diagnosis Date     HTN (hypertension)      Primary pulmonary hypertension (H)      Rheumatoid arthritis(714.0)        PAST SURGICAL HISTORY:  Past Surgical History:   Procedure Laterality Date     ANESTHESIA CARDIOVERSION N/A 5/17/2017    Procedure: ANESTHESIA CARDIOVERSION;  ANESTHESIA CARDIOVERSION;  Surgeon: GENERIC ANESTHESIA PROVIDER;  Location: [facility] OR     ARTHRODESIS WRIST  2000    Right wrist     FOOT SURGERY      4 left and 2 right     RELEASE CARPAL TUNNEL BILATERAL       REPAIR VENTRICULAR SEPTAL DEFECT  1969       SH and FH reviewed in EPIC  Never smoker  No illicits    Cardiac meds: Metoprolol 100mg daily, diltiazem 120mg daily, amiodorone 400mg bid, apixaban 5mg bid  Other meds: methotrexate, prednisione, plaquenil,     ALL  Allergies   Allergen Reactions     Penicillins      Tape [Adhesive Tape] Rash       VITAL SIGNS:  BP (!) 75/61  Pulse (!) 43  Temp 99.3  F (37.4  C) (Axillary)  Resp 16  Ht 1.473 m (4' 10\")  Wt 63.5 kg (140 lb)  SpO2 (!) 68%  BMI 29.26 kg/m2    PHYSICAL EXAM  General: sedated and intubated   Eyes:  Pinpoint, but reactive  ENT: patent nares, intubated   Respiratory: course breath sounds b/l   Cardiac: distant, s1, s2, no murmurs appreciated   GI: soft, non tender, non distended  MSK: no deformities, amputation L big toe   Skin: no rash  Neurologic: sedated     LABS: reviewed in EPIC  Cr 1.2  HCo3-18    Lactic acid. 6.8>11>10>>8.3  Trop 2.0  LDH 1655  CRP 58  AST/ALT- 732/1186  Alb 1.8    Wbc10, hgb 11.5    inr 3    ECG- apaced         CORONARY ANGIOGRAM:   1. Both coronary arteries arise from their respective cusps.  2. Right dominant.  3. LM is without angiographic evidence of disease.   4. LAD supplies the apex along with the RCA (type 2 LAD) and gives rise to septal perforators and diagonal branches, all without angiographic evidence of disease.      5. LCX " gives rise to OM1 and OM2 vessels, all without angiographic evidence of disease.    6. RCA was not studied.       CT head  Findings:    Mild cerebral volume loss. No intracranial hemorrhage, mass effect, or  midline shift. The ventricles are proportionate to the cerebral sulci.  The gray to white matter differentiation of the cerebral hemispheres  is preserved. The basal cisterns are patent.       CMR 5/22/17  IMPRESSION:  1.  Normal left ventricular size and low-normal systolic function with  a calculated ejection fraction of  55 %.  2.  Mildly dilated right ventricular size and normal right ventricular  systolic function with a calculated ejection fraction of 51%. There is  a focal area of dyskinesis in the right ventricular outflow tract, the  etiology of which is unclear. If the patient's clinical history is  compatible, this could represent the site of prior surgical  modification of the RVOT.   3.  Normal variant pulmonary venous drainage into the left atrium with  a common origin of the left pulmonary veins and measurements as  described below.   4.  Severe biatrial enlargement.  5.  Severe eccentric tricuspid regurgitation, the mechanism of which  is not clearly demonstrated.  6.  Mild to moderate mitral regurgitation.  7.  There are no intracardiac thrombi; specifically, the left atrial  appendage is free of thrombus.  8.  On delayed enhancement imaging, there is hyperenhancement at the  RV septal insertion site. This is a nonspecific finding which can be  seen in the setting of diseases involving the right ventricle.    MRA 5/22/17  MR ANGIOGRAM PULMONARY VEINS:  There is normal variant pulmonary venous anatomy with a common origin  of the left pulmonary veins. The common trunk of the left pulmonary  veins branches early into 3 left pulmonary veins. There is no  accessory or anomalous venous drainage.   The origin of the right superior pulmonary vein measures:  1.8 x 1.5cm  The origin of the right inferior  pulmonary vein measures:  1.5 x 1.5cm  The common origin of the left pulmonary veins measures:  2.9 x 1.3 cm.    ------------------  Assessment:  Amelia Michel is a 56 year old with hx VSD s/p surgical repair (1969), RA, HTN and recently dx Afib/flutter/SVT who is presenting after a cardiac arrest. Initial rhythm at EMS arrival was shockble. ROSC obtained on the field. Rhythm at ER presentation was ventriclar escape. S/p Coronary angiogram with no angiographic evidence of disease. Temp PM place in the CCL in the RA, patient did not tolerate RV pacing. Cause of arrest is unclear, i suspect sinus arrest in the setting of amiodarone, but primary VT/VF from prior surgical scar also possible. Aspiration PNA also possible, I doubt urosepsis was the cause given presentation. Electrolyte abnormality also very possible given hx of diarrhea and emesis.     Patient is currently critically ill requiring vaso-active medications to maintain MAPs in addition to pacing.    Neuro  # Likely anoxic brain injury   # Sedation  -- Cooling protocol  -- fentanyl, versed for sedation    CV  # hx VSD s/p Repair  # Atrial Fib, flutter and SVT (with difficult to control rates)  # Sinus arrest  # Cardiac Arrest  # Cardiogenic Shock  -- d/c amiodorone, BB and diltiazem for now  -- TPM via RFV   -- pace at 80  -- Continue epi as needed  -- serial SVO2  -- Lasix 40mg iv   -- TTE  -- close monitoring as patient may need ECMO  -- hold AC     Pulm  # Hypoxic respiratory failure, likely 2/2 cardiogenic shock and aspiration PNA  -- Lung protective ventilation  -- daily pressure support   -- Lasix as above  -- Abx     ID   # ESBL UTI  # Aspiration PNA   -- Vanco per pharm  -- Meropenem 1G q8h  -- Bcx, Ucx, Rcx   -- fever, wbc curve     Rheum   # RA   -- Hold meds for now   -- Will send SLE serologies for low chance of inflammatory flair     Renal   CELSA   UA, Gilma  Continue to trend   Avoid nephrotoxic meds   diurse for now     GI  # Shock Liver   #  "Feeding   -- trend LFTs  -- no indication for viral serologies at this time  -- likely start feeds tomorrow with post pyloric NGT    Heme  # Coagulopathy, likely shock liver  # Previously on apixaban   -- continue to monitor   -- no vitK or k-centra at this time     Endo   Hypoglycemia  continue to monitor     Ppx   PPI  No anticoagulation at this time     Patient seen and discuss with Dr. Pelon Nguyễn MD   Cardiology Fellow   *[phone]      I have seen and examined the patient with the [facility] team. I agree with the assessment and plan of the note above.I have reviewed pertinent labs.     Sundar Wood MD  Interventional Cardiology  Pager: [phone]  \"  "

## 2017-05-29 NOTE — PROGRESS NOTES
VEEG monitoring preliminary results:    VEEG has been running for over one hour.  EEG showed severe generalized slowing with superimposed fast activities.  No clinical or electrographic seizures were observed.    Hakeem Galindo MD  Neurology

## 2017-05-30 ENCOUNTER — APPOINTMENT (OUTPATIENT)
Dept: GENERAL RADIOLOGY | Facility: CLINIC | Age: 57
DRG: 260 | End: 2017-05-30
Attending: STUDENT IN AN ORGANIZED HEALTH CARE EDUCATION/TRAINING PROGRAM
Payer: COMMERCIAL

## 2017-05-30 ENCOUNTER — ALLIED HEALTH/NURSE VISIT (OUTPATIENT)
Dept: NEUROLOGY | Facility: CLINIC | Age: 57
DRG: 260 | End: 2017-05-30
Attending: PSYCHIATRY & NEUROLOGY
Payer: COMMERCIAL

## 2017-05-30 ENCOUNTER — APPOINTMENT (OUTPATIENT)
Dept: ULTRASOUND IMAGING | Facility: CLINIC | Age: 57
DRG: 260 | End: 2017-05-30
Attending: INTERNAL MEDICINE
Payer: COMMERCIAL

## 2017-05-30 ENCOUNTER — APPOINTMENT (OUTPATIENT)
Dept: CARDIOLOGY | Facility: CLINIC | Age: 57
DRG: 260 | End: 2017-05-30
Attending: NURSE PRACTITIONER
Payer: COMMERCIAL

## 2017-05-30 ENCOUNTER — APPOINTMENT (OUTPATIENT)
Dept: GENERAL RADIOLOGY | Facility: CLINIC | Age: 57
DRG: 260 | End: 2017-05-30
Attending: INTERNAL MEDICINE
Payer: COMMERCIAL

## 2017-05-30 DIAGNOSIS — R57.0 CARDIOGENIC SHOCK (H): Primary | ICD-10-CM

## 2017-05-30 LAB
ACETAMINOPHEN QUAL: NEGATIVE
ALBUMIN SERPL-MCNC: 1.6 G/DL (ref 3.4–5)
ALBUMIN SERPL-MCNC: 1.7 G/DL (ref 3.4–5)
ALP SERPL-CCNC: 125 U/L (ref 40–150)
ALP SERPL-CCNC: 135 U/L (ref 40–150)
ALP SERPL-CCNC: 140 U/L (ref 40–150)
ALP SERPL-CCNC: 148 U/L (ref 40–150)
ALT SERPL W P-5'-P-CCNC: 820 U/L (ref 0–50)
ALT SERPL W P-5'-P-CCNC: 850 U/L (ref 0–50)
ALT SERPL W P-5'-P-CCNC: 873 U/L (ref 0–50)
ALT SERPL W P-5'-P-CCNC: 920 U/L (ref 0–50)
AMOBARBITAL QUAL: NEGATIVE
ANION GAP SERPL CALCULATED.3IONS-SCNC: 10 MMOL/L (ref 3–14)
ANION GAP SERPL CALCULATED.3IONS-SCNC: 10 MMOL/L (ref 3–14)
ANION GAP SERPL CALCULATED.3IONS-SCNC: 14 MMOL/L (ref 3–14)
ANION GAP SERPL CALCULATED.3IONS-SCNC: 20 MMOL/L (ref 3–14)
APTT PPP: 40 SEC (ref 22–37)
APTT PPP: 42 SEC (ref 22–37)
APTT PPP: 44 SEC (ref 22–37)
AST SERPL W P-5'-P-CCNC: 725 U/L (ref 0–45)
AST SERPL W P-5'-P-CCNC: 729 U/L (ref 0–45)
AST SERPL W P-5'-P-CCNC: 776 U/L (ref 0–45)
AST SERPL W P-5'-P-CCNC: 862 U/L (ref 0–45)
AT III ACT/NOR PPP CHRO: 49 % (ref 85–135)
BACTERIA SPEC CULT: NORMAL
BARBITAL QUAL: NEGATIVE
BASE DEFICIT BLDA-SCNC: 0.6 MMOL/L
BASE DEFICIT BLDA-SCNC: 3.1 MMOL/L
BASE DEFICIT BLDA-SCNC: 5 MMOL/L
BASE DEFICIT BLDA-SCNC: 5.2 MMOL/L
BASE DEFICIT BLDV-SCNC: 1.3 MMOL/L
BASE EXCESS BLDA CALC-SCNC: 2.5 MMOL/L
BASE EXCESS BLDA CALC-SCNC: 3 MMOL/L
BASE EXCESS BLDA CALC-SCNC: 4.2 MMOL/L
BASE EXCESS BLDA CALC-SCNC: 5.4 MMOL/L
BASE EXCESS BLDA CALC-SCNC: 5.5 MMOL/L
BASE EXCESS BLDA CALC-SCNC: 6 MMOL/L
BASE EXCESS BLDA CALC-SCNC: 6.1 MMOL/L
BASE EXCESS BLDA CALC-SCNC: 7 MMOL/L
BASE EXCESS BLDV CALC-SCNC: 3.5 MMOL/L
BASOPHILS # BLD AUTO: 0 10E9/L (ref 0–0.2)
BASOPHILS NFR BLD AUTO: 0.3 %
BILIRUB SERPL-MCNC: 1.6 MG/DL (ref 0.2–1.3)
BILIRUB SERPL-MCNC: 1.6 MG/DL (ref 0.2–1.3)
BILIRUB SERPL-MCNC: 1.8 MG/DL (ref 0.2–1.3)
BILIRUB SERPL-MCNC: 2 MG/DL (ref 0.2–1.3)
BUN SERPL-MCNC: 31 MG/DL (ref 7–30)
BUN SERPL-MCNC: 34 MG/DL (ref 7–30)
BUN SERPL-MCNC: 36 MG/DL (ref 7–30)
BUN SERPL-MCNC: 40 MG/DL (ref 7–30)
BUTABARBITAL QUAL: NEGATIVE
BUTALBITAL QUAL: NEGATIVE
CA-I BLD-MCNC: 4.1 MG/DL (ref 4.4–5.2)
CA-I BLD-MCNC: 4.3 MG/DL (ref 4.4–5.2)
CA-I BLD-MCNC: 4.5 MG/DL (ref 4.4–5.2)
CA-I BLD-MCNC: 4.8 MG/DL (ref 4.4–5.2)
CAFFEINE QUAL: NEGATIVE
CALCIUM SERPL-MCNC: 8 MG/DL (ref 8.5–10.1)
CALCIUM SERPL-MCNC: 8 MG/DL (ref 8.5–10.1)
CALCIUM SERPL-MCNC: 8.1 MG/DL (ref 8.5–10.1)
CALCIUM SERPL-MCNC: 8.8 MG/DL (ref 8.5–10.1)
CARBAMAZEPINE QUAL: NEGATIVE
CARISOPRODOL QUAL: NEGATIVE
CHLORIDE SERPL-SCNC: 104 MMOL/L (ref 94–109)
CHLORIDE SERPL-SCNC: 105 MMOL/L (ref 94–109)
CHLORIDE SERPL-SCNC: 106 MMOL/L (ref 94–109)
CHLORIDE SERPL-SCNC: 107 MMOL/L (ref 94–109)
CHLORPROPAMIDE UR-MCNC: NEGATIVE UG/ML
CO2 SERPL-SCNC: 21 MMOL/L (ref 20–32)
CO2 SERPL-SCNC: 28 MMOL/L (ref 20–32)
CO2 SERPL-SCNC: 31 MMOL/L (ref 20–32)
CO2 SERPL-SCNC: 32 MMOL/L (ref 20–32)
CORTIS SERPL-MCNC: 39.2 UG/DL (ref 4–22)
CREAT SERPL-MCNC: 1.62 MG/DL (ref 0.52–1.04)
CREAT SERPL-MCNC: 1.67 MG/DL (ref 0.52–1.04)
CREAT SERPL-MCNC: 1.7 MG/DL (ref 0.52–1.04)
CREAT SERPL-MCNC: 1.78 MG/DL (ref 0.52–1.04)
CRP SERPL-MCNC: 110 MG/L (ref 0–8)
D DIMER PPP FEU-MCNC: 12.4 UG/ML FEU (ref 0–0.5)
D DIMER PPP FEU-MCNC: 8.4 UG/ML FEU (ref 0–0.5)
DIFFERENTIAL METHOD BLD: ABNORMAL
DRUGS SERPL SCN: NEGATIVE
EOSINOPHIL # BLD AUTO: 0 10E9/L (ref 0–0.7)
EOSINOPHIL NFR BLD AUTO: 0.1 %
ERYTHROCYTE [DISTWIDTH] IN BLOOD BY AUTOMATED COUNT: 18 % (ref 10–15)
ERYTHROCYTE [DISTWIDTH] IN BLOOD BY AUTOMATED COUNT: 18 % (ref 10–15)
ERYTHROCYTE [DISTWIDTH] IN BLOOD BY AUTOMATED COUNT: 18.2 % (ref 10–15)
ERYTHROCYTE [DISTWIDTH] IN BLOOD BY AUTOMATED COUNT: 18.6 % (ref 10–15)
ERYTHROCYTE [SEDIMENTATION RATE] IN BLOOD BY WESTERGREN METHOD: 34 MM/H (ref 0–30)
ETHCLORVYNOL QUAL: NEGATIVE
ETHINAMATE QUAL: NEGATIVE
ETHOSUXIMIDE QUAL: NEGATIVE
ETHOTOIN QUAL: NEGATIVE
FIBRINOGEN PPP-MCNC: 404 MG/DL (ref 200–420)
FIBRINOGEN PPP-MCNC: 414 MG/DL (ref 200–420)
GFR SERPL CREATININE-BSD FRML MDRD: 29 ML/MIN/1.7M2
GFR SERPL CREATININE-BSD FRML MDRD: 31 ML/MIN/1.7M2
GFR SERPL CREATININE-BSD FRML MDRD: 32 ML/MIN/1.7M2
GFR SERPL CREATININE-BSD FRML MDRD: 33 ML/MIN/1.7M2
GLUCOSE BLD-MCNC: 135 MG/DL (ref 70–99)
GLUCOSE BLD-MCNC: 136 MG/DL (ref 70–99)
GLUCOSE BLD-MCNC: 150 MG/DL (ref 70–99)
GLUCOSE BLD-MCNC: 152 MG/DL (ref 70–99)
GLUCOSE BLDC GLUCOMTR-MCNC: 115 MG/DL (ref 70–99)
GLUCOSE BLDC GLUCOMTR-MCNC: 126 MG/DL (ref 70–99)
GLUCOSE BLDC GLUCOMTR-MCNC: 130 MG/DL (ref 70–99)
GLUCOSE BLDC GLUCOMTR-MCNC: 131 MG/DL (ref 70–99)
GLUCOSE BLDC GLUCOMTR-MCNC: 137 MG/DL (ref 70–99)
GLUCOSE BLDC GLUCOMTR-MCNC: 140 MG/DL (ref 70–99)
GLUCOSE BLDC GLUCOMTR-MCNC: 142 MG/DL (ref 70–99)
GLUCOSE BLDC GLUCOMTR-MCNC: 143 MG/DL (ref 70–99)
GLUCOSE BLDC GLUCOMTR-MCNC: 145 MG/DL (ref 70–99)
GLUCOSE BLDC GLUCOMTR-MCNC: 154 MG/DL (ref 70–99)
GLUCOSE BLDC GLUCOMTR-MCNC: 160 MG/DL (ref 70–99)
GLUCOSE BLDC GLUCOMTR-MCNC: 166 MG/DL (ref 70–99)
GLUCOSE BLDC GLUCOMTR-MCNC: 175 MG/DL (ref 70–99)
GLUCOSE BLDC GLUCOMTR-MCNC: 87 MG/DL (ref 70–99)
GLUCOSE SERPL-MCNC: 140 MG/DL (ref 70–99)
GLUCOSE SERPL-MCNC: 142 MG/DL (ref 70–99)
GLUCOSE SERPL-MCNC: 148 MG/DL (ref 70–99)
GLUCOSE SERPL-MCNC: 149 MG/DL (ref 70–99)
GLUTETHIMIDE QUAL: NEGATIVE
HCO3 BLD-SCNC: 19 MMOL/L (ref 21–28)
HCO3 BLD-SCNC: 19 MMOL/L (ref 21–28)
HCO3 BLD-SCNC: 21 MMOL/L (ref 21–28)
HCO3 BLD-SCNC: 23 MMOL/L (ref 21–28)
HCO3 BLD-SCNC: 26 MMOL/L (ref 21–28)
HCO3 BLD-SCNC: 27 MMOL/L (ref 21–28)
HCO3 BLD-SCNC: 28 MMOL/L (ref 21–28)
HCO3 BLD-SCNC: 30 MMOL/L (ref 21–28)
HCO3 BLD-SCNC: 30 MMOL/L (ref 21–28)
HCO3 BLD-SCNC: 31 MMOL/L (ref 21–28)
HCO3 BLDV-SCNC: 23 MMOL/L (ref 21–28)
HCO3 BLDV-SCNC: 28 MMOL/L (ref 21–28)
HCT VFR BLD AUTO: 27.9 % (ref 35–47)
HCT VFR BLD AUTO: 29.1 % (ref 35–47)
HCT VFR BLD AUTO: 29.7 % (ref 35–47)
HCT VFR BLD AUTO: 32.7 % (ref 35–47)
HGB BLD-MCNC: 10.9 G/DL (ref 11.7–15.7)
HGB BLD-MCNC: 9.7 G/DL (ref 11.7–15.7)
HGB BLD-MCNC: 9.8 G/DL (ref 11.7–15.7)
HGB BLD-MCNC: 9.9 G/DL (ref 11.7–15.7)
HGB FREE PLAS-MCNC: 40 MG/DL
IBUPROFEN QUAL: NEGATIVE
IMM GRANULOCYTES # BLD: 0.1 10E9/L (ref 0–0.4)
IMM GRANULOCYTES NFR BLD: 1 %
INR PPP: 3.92 (ref 0.86–1.14)
INR PPP: 4.09 (ref 0.86–1.14)
INR PPP: 4.38 (ref 0.86–1.14)
INR PPP: 4.45 (ref 0.86–1.14)
INTERPRETATION ECG - MUSE: NORMAL
INTERPRETATION ECG - MUSE: NORMAL
LACTATE BLD-SCNC: 10.5 MMOL/L (ref 0.7–2.1)
LACTATE BLD-SCNC: 13.3 MMOL/L (ref 0.7–2.1)
LACTATE BLD-SCNC: 14.8 MMOL/L (ref 0.7–2.1)
LACTATE BLD-SCNC: 16 MMOL/L (ref 0.7–2.1)
LACTATE BLD-SCNC: 4.2 MMOL/L (ref 0.7–2.1)
LACTATE BLD-SCNC: 4.5 MMOL/L (ref 0.7–2.1)
LACTATE BLD-SCNC: 4.8 MMOL/L (ref 0.7–2.1)
LACTATE BLD-SCNC: 5.2 MMOL/L (ref 0.7–2.1)
LACTATE BLD-SCNC: 7.3 MMOL/L (ref 0.7–2.1)
LACTATE BLD-SCNC: 8.3 MMOL/L (ref 0.7–2.1)
LDH SERPL L TO P-CCNC: 919 U/L (ref 81–234)
LIPASE SERPL-CCNC: 88 U/L (ref 73–393)
LMWH PPP CHRO-ACNC: ABNORMAL IU/ML
LYMPHOCYTES # BLD AUTO: 0.7 10E9/L (ref 0.8–5.3)
LYMPHOCYTES NFR BLD AUTO: 9.5 %
MAGNESIUM SERPL-MCNC: 2.1 MG/DL (ref 1.6–2.3)
MAGNESIUM SERPL-MCNC: 2.2 MG/DL (ref 1.6–2.3)
MCH RBC QN AUTO: 34.7 PG (ref 26.5–33)
MCH RBC QN AUTO: 35.1 PG (ref 26.5–33)
MCH RBC QN AUTO: 35.4 PG (ref 26.5–33)
MCH RBC QN AUTO: 35.7 PG (ref 26.5–33)
MCHC RBC AUTO-ENTMCNC: 33.3 G/DL (ref 31.5–36.5)
MCHC RBC AUTO-ENTMCNC: 33.3 G/DL (ref 31.5–36.5)
MCHC RBC AUTO-ENTMCNC: 33.7 G/DL (ref 31.5–36.5)
MCHC RBC AUTO-ENTMCNC: 34.8 G/DL (ref 31.5–36.5)
MCV RBC AUTO: 103 FL (ref 78–100)
MCV RBC AUTO: 104 FL (ref 78–100)
MCV RBC AUTO: 104 FL (ref 78–100)
MCV RBC AUTO: 106 FL (ref 78–100)
MEPHENYTOIN QUAL: NEGATIVE
MEPHOBARBITAL QUAL: NEGATIVE
MEPROBAMATE QUAL: NEGATIVE
METHAQUALONE QUAL: NEGATIVE
METHARBITAL QUAL: NEGATIVE
METHSUXIMIDE QUAL: NEGATIVE
METHYPRYLON QUAL: NEGATIVE
MICRO REPORT STATUS: NORMAL
MONOCYTES # BLD AUTO: 0.7 10E9/L (ref 0–1.3)
MONOCYTES NFR BLD AUTO: 8.8 %
NEUTROPHILS # BLD AUTO: 6.2 10E9/L (ref 1.6–8.3)
NEUTROPHILS NFR BLD AUTO: 80.3 %
NRBC # BLD AUTO: 0 10*3/UL
NRBC BLD AUTO-RTO: 0 /100
O2/TOTAL GAS SETTING VFR VENT: 40 %
O2/TOTAL GAS SETTING VFR VENT: 50 %
O2/TOTAL GAS SETTING VFR VENT: 60 %
O2/TOTAL GAS SETTING VFR VENT: 60 %
O2/TOTAL GAS SETTING VFR VENT: 70 %
O2/TOTAL GAS SETTING VFR VENT: ABNORMAL %
O2/TOTAL GAS SETTING VFR VENT: NORMAL %
OXYHGB MFR BLD: 85 % (ref 92–100)
OXYHGB MFR BLD: 87 % (ref 92–100)
OXYHGB MFR BLD: 88 % (ref 92–100)
OXYHGB MFR BLD: 89 % (ref 92–100)
OXYHGB MFR BLD: 89 % (ref 92–100)
OXYHGB MFR BLD: 90 % (ref 92–100)
OXYHGB MFR BLD: 91 % (ref 92–100)
OXYHGB MFR BLD: 92 % (ref 92–100)
OXYHGB MFR BLD: 95 % (ref 92–100)
OXYHGB MFR BLD: 96 % (ref 92–100)
OXYHGB MFR BLD: 96 % (ref 92–100)
OXYHGB MFR BLD: 97 % (ref 92–100)
OXYHGB MFR BLDV: 47 %
OXYHGB MFR BLDV: 55 %
PCO2 BLD: 28 MM HG (ref 35–45)
PCO2 BLD: 30 MM HG (ref 35–45)
PCO2 BLD: 30 MM HG (ref 35–45)
PCO2 BLD: 33 MM HG (ref 35–45)
PCO2 BLD: 35 MM HG (ref 35–45)
PCO2 BLD: 39 MM HG (ref 35–45)
PCO2 BLD: 39 MM HG (ref 35–45)
PCO2 BLD: 44 MM HG (ref 35–45)
PCO2 BLD: 45 MM HG (ref 35–45)
PCO2 BLD: 45 MM HG (ref 35–45)
PCO2 BLD: 46 MM HG (ref 35–45)
PCO2 BLD: 46 MM HG (ref 35–45)
PCO2 BLDV: 38 MM HG (ref 40–50)
PCO2 BLDV: 44 MM HG (ref 40–50)
PENTOBARBITAL QUAL: NEGATIVE
PH BLD: 7.41 PH (ref 7.35–7.45)
PH BLD: 7.43 PH (ref 7.35–7.45)
PH BLD: 7.43 PH (ref 7.35–7.45)
PH BLD: 7.44 PH (ref 7.35–7.45)
PH BLD: 7.45 PH (ref 7.35–7.45)
PH BLD: 7.45 PH (ref 7.35–7.45)
PH BLD: 7.46 PH (ref 7.35–7.45)
PH BLD: 7.46 PH (ref 7.35–7.45)
PH BLD: 7.47 PH (ref 7.35–7.45)
PH BLD: 7.48 PH (ref 7.35–7.45)
PH BLDV: 7.4 PH (ref 7.32–7.43)
PH BLDV: 7.42 PH (ref 7.32–7.43)
PHENACETIN QUAL: NEGATIVE
PHENOBARBITAL QUAL: NEGATIVE
PHENSUXIMIDE QUAL: NEGATIVE
PHENYTOIN QUAL: NEGATIVE
PHOSPHATE SERPL-MCNC: 4.5 MG/DL (ref 2.5–4.5)
PLATELET # BLD AUTO: 43 10E9/L (ref 150–450)
PLATELET # BLD AUTO: 47 10E9/L (ref 150–450)
PLATELET # BLD AUTO: 56 10E9/L (ref 150–450)
PLATELET # BLD AUTO: 60 10E9/L (ref 150–450)
PO2 BLD: 113 MM HG (ref 80–105)
PO2 BLD: 152 MM HG (ref 80–105)
PO2 BLD: 57 MM HG (ref 80–105)
PO2 BLD: 58 MM HG (ref 80–105)
PO2 BLD: 61 MM HG (ref 80–105)
PO2 BLD: 63 MM HG (ref 80–105)
PO2 BLD: 64 MM HG (ref 80–105)
PO2 BLD: 64 MM HG (ref 80–105)
PO2 BLD: 68 MM HG (ref 80–105)
PO2 BLD: 71 MM HG (ref 80–105)
PO2 BLD: 87 MM HG (ref 80–105)
PO2 BLD: 89 MM HG (ref 80–105)
PO2 BLDV: 30 MM HG (ref 25–47)
PO2 BLDV: 34 MM HG (ref 25–47)
POTASSIUM BLD-SCNC: 4.2 MMOL/L (ref 3.4–5.3)
POTASSIUM SERPL-SCNC: 3.3 MMOL/L (ref 3.4–5.3)
POTASSIUM SERPL-SCNC: 3.8 MMOL/L (ref 3.4–5.3)
POTASSIUM SERPL-SCNC: 4 MMOL/L (ref 3.4–5.3)
POTASSIUM SERPL-SCNC: 4.3 MMOL/L (ref 3.4–5.3)
PRIMIDONE QUAL: NEGATIVE
PROT SERPL-MCNC: 4 G/DL (ref 6.8–8.8)
PROT SERPL-MCNC: 4 G/DL (ref 6.8–8.8)
PROT SERPL-MCNC: 4.1 G/DL (ref 6.8–8.8)
PROT SERPL-MCNC: 4.3 G/DL (ref 6.8–8.8)
RBC # BLD AUTO: 2.72 10E12/L (ref 3.8–5.2)
RBC # BLD AUTO: 2.79 10E12/L (ref 3.8–5.2)
RBC # BLD AUTO: 2.85 10E12/L (ref 3.8–5.2)
RBC # BLD AUTO: 3.08 10E12/L (ref 3.8–5.2)
RETICS # AUTO: 109.5 10E9/L (ref 25–95)
RETICS/RBC NFR AUTO: 3.9 % (ref 0.5–2)
SALICYLATE QUAL: NEGATIVE
SECOBARBITAL QUAL: NEGATIVE
SODIUM SERPL-SCNC: 145 MMOL/L (ref 133–144)
SODIUM SERPL-SCNC: 146 MMOL/L (ref 133–144)
SODIUM SERPL-SCNC: 148 MMOL/L (ref 133–144)
SODIUM SERPL-SCNC: 148 MMOL/L (ref 133–144)
SPECIMEN SOURCE: NORMAL
TALBUTAL QUAL: NEGATIVE
THEOPHYLLINE QUAL: NEGATIVE
THIOPENTAL QUAL: NEGATIVE
TROPONIN I SERPL-MCNC: 0.38 UG/L (ref 0–0.04)
TROPONIN I SERPL-MCNC: 0.46 UG/L (ref 0–0.04)
TROPONIN I SERPL-MCNC: 0.57 UG/L (ref 0–0.04)
TYBAMATE QUAL: NEGATIVE
VALPROIC ACID QUAL: NORMAL
WBC # BLD AUTO: 7 10E9/L (ref 4–11)
WBC # BLD AUTO: 7.7 10E9/L (ref 4–11)
WBC # BLD AUTO: 9 10E9/L (ref 4–11)
WBC # BLD AUTO: 9 10E9/L (ref 4–11)

## 2017-05-30 PROCEDURE — 40000982 ZZH STATISTICAL HAIKU-CANTO PHOTO

## 2017-05-30 PROCEDURE — 99152 MOD SED SAME PHYS/QHP 5/>YRS: CPT | Performed by: INTERNAL MEDICINE

## 2017-05-30 PROCEDURE — 25000128 H RX IP 250 OP 636: Performed by: INTERNAL MEDICINE

## 2017-05-30 PROCEDURE — 82330 ASSAY OF CALCIUM: CPT | Performed by: INTERNAL MEDICINE

## 2017-05-30 PROCEDURE — 25000131 ZZH RX MED GY IP 250 OP 636 PS 637: Performed by: INTERNAL MEDICINE

## 2017-05-30 PROCEDURE — 94003 VENT MGMT INPAT SUBQ DAY: CPT

## 2017-05-30 PROCEDURE — 84100 ASSAY OF PHOSPHORUS: CPT | Performed by: INTERNAL MEDICINE

## 2017-05-30 PROCEDURE — 86140 C-REACTIVE PROTEIN: CPT | Performed by: INTERNAL MEDICINE

## 2017-05-30 PROCEDURE — 40000611 ZZHCL STATISTIC MORPHOLOGY W/INTERP HEMEPATH TC 85060: Performed by: INTERNAL MEDICINE

## 2017-05-30 PROCEDURE — 25000125 ZZHC RX 250: Performed by: INTERNAL MEDICINE

## 2017-05-30 PROCEDURE — 82947 ASSAY GLUCOSE BLOOD QUANT: CPT | Performed by: INTERNAL MEDICINE

## 2017-05-30 PROCEDURE — 25000132 ZZH RX MED GY IP 250 OP 250 PS 637: Performed by: INTERNAL MEDICINE

## 2017-05-30 PROCEDURE — 87040 BLOOD CULTURE FOR BACTERIA: CPT | Performed by: INTERNAL MEDICINE

## 2017-05-30 PROCEDURE — 83605 ASSAY OF LACTIC ACID: CPT | Performed by: INTERNAL MEDICINE

## 2017-05-30 PROCEDURE — 83690 ASSAY OF LIPASE: CPT | Performed by: INTERNAL MEDICINE

## 2017-05-30 PROCEDURE — 99211 OFF/OP EST MAY X REQ PHY/QHP: CPT

## 2017-05-30 PROCEDURE — 71010 XR CHEST PORT 1 VW: CPT | Mod: 77

## 2017-05-30 PROCEDURE — 33216 INSERT 1 ELECTRODE PM-DEFIB: CPT | Mod: GC | Performed by: INTERNAL MEDICINE

## 2017-05-30 PROCEDURE — 93005 ELECTROCARDIOGRAM TRACING: CPT

## 2017-05-30 PROCEDURE — 85300 ANTITHROMBIN III ACTIVITY: CPT | Performed by: INTERNAL MEDICINE

## 2017-05-30 PROCEDURE — 85027 COMPLETE CBC AUTOMATED: CPT | Performed by: INTERNAL MEDICINE

## 2017-05-30 PROCEDURE — 20000004 ZZH R&B ICU UMMC

## 2017-05-30 PROCEDURE — 83735 ASSAY OF MAGNESIUM: CPT | Performed by: INTERNAL MEDICINE

## 2017-05-30 PROCEDURE — 40000196 ZZH STATISTIC RAPCV CVP MONITORING

## 2017-05-30 PROCEDURE — 93010 ELECTROCARDIOGRAM REPORT: CPT | Mod: 76 | Performed by: INTERNAL MEDICINE

## 2017-05-30 PROCEDURE — 5A1945Z RESPIRATORY VENTILATION, 24-96 CONSECUTIVE HOURS: ICD-10-PCS | Performed by: INTERNAL MEDICINE

## 2017-05-30 PROCEDURE — 82805 BLOOD GASES W/O2 SATURATION: CPT | Performed by: INTERNAL MEDICINE

## 2017-05-30 PROCEDURE — 85045 AUTOMATED RETICULOCYTE COUNT: CPT | Performed by: INTERNAL MEDICINE

## 2017-05-30 PROCEDURE — 00000146 ZZHCL STATISTIC GLUCOSE BY METER IP

## 2017-05-30 PROCEDURE — 85730 THROMBOPLASTIN TIME PARTIAL: CPT | Performed by: INTERNAL MEDICINE

## 2017-05-30 PROCEDURE — 27210982 ZZH KIT RT HC TOTES DISP CR7

## 2017-05-30 PROCEDURE — 82533 TOTAL CORTISOL: CPT | Performed by: INTERNAL MEDICINE

## 2017-05-30 PROCEDURE — 85384 FIBRINOGEN ACTIVITY: CPT | Performed by: INTERNAL MEDICINE

## 2017-05-30 PROCEDURE — 99221 1ST HOSP IP/OBS SF/LOW 40: CPT | Mod: 25 | Performed by: INTERNAL MEDICINE

## 2017-05-30 PROCEDURE — 40000014 ZZH STATISTIC ARTERIAL MONITORING DAILY

## 2017-05-30 PROCEDURE — 93925 LOWER EXTREMITY STUDY: CPT

## 2017-05-30 PROCEDURE — 85610 PROTHROMBIN TIME: CPT | Performed by: INTERNAL MEDICINE

## 2017-05-30 PROCEDURE — 99153 MOD SED SAME PHYS/QHP EA: CPT

## 2017-05-30 PROCEDURE — 84132 ASSAY OF SERUM POTASSIUM: CPT | Performed by: INTERNAL MEDICINE

## 2017-05-30 PROCEDURE — 83051 HEMOGLOBIN PLASMA: CPT | Performed by: INTERNAL MEDICINE

## 2017-05-30 PROCEDURE — 99153 MOD SED SAME PHYS/QHP EA: CPT | Performed by: INTERNAL MEDICINE

## 2017-05-30 PROCEDURE — 95951 ZZHC EEG VIDEO EACH 24 HR: CPT | Mod: ZF

## 2017-05-30 PROCEDURE — 87070 CULTURE OTHR SPECIMN AEROBIC: CPT | Performed by: INTERNAL MEDICINE

## 2017-05-30 PROCEDURE — C1898 LEAD, PMKR, OTHER THAN TRANS: HCPCS

## 2017-05-30 PROCEDURE — 85652 RBC SED RATE AUTOMATED: CPT | Performed by: INTERNAL MEDICINE

## 2017-05-30 PROCEDURE — 5A2204Z RESTORATION OF CARDIAC RHYTHM, SINGLE: ICD-10-PCS | Performed by: INTERNAL MEDICINE

## 2017-05-30 PROCEDURE — 02H63JZ INSERTION OF PACEMAKER LEAD INTO RIGHT ATRIUM, PERCUTANEOUS APPROACH: ICD-10-PCS | Performed by: INTERNAL MEDICINE

## 2017-05-30 PROCEDURE — 25000125 ZZHC RX 250: Performed by: STUDENT IN AN ORGANIZED HEALTH CARE EDUCATION/TRAINING PROGRAM

## 2017-05-30 PROCEDURE — 40000275 ZZH STATISTIC RCP TIME EA 10 MIN

## 2017-05-30 PROCEDURE — 85004 AUTOMATED DIFF WBC COUNT: CPT | Performed by: INTERNAL MEDICINE

## 2017-05-30 PROCEDURE — 33210 INSERT ELECTRD/PM CATH SNGL: CPT

## 2017-05-30 PROCEDURE — C1894 INTRO/SHEATH, NON-LASER: HCPCS

## 2017-05-30 PROCEDURE — 99233 SBSQ HOSP IP/OBS HIGH 50: CPT | Mod: GC | Performed by: INTERNAL MEDICINE

## 2017-05-30 PROCEDURE — 99207 ZZC CDG-CORRECTLY CODED, REVIEWED AND AGREE: CPT | Performed by: CLINICAL NURSE SPECIALIST

## 2017-05-30 PROCEDURE — 40000115 ZZH STATISTIC NURSE TLC VISIT: Performed by: CLINICAL NURSE SPECIALIST

## 2017-05-30 PROCEDURE — 85379 FIBRIN DEGRADATION QUANT: CPT | Performed by: INTERNAL MEDICINE

## 2017-05-30 PROCEDURE — 85520 HEPARIN ASSAY: CPT | Performed by: INTERNAL MEDICINE

## 2017-05-30 PROCEDURE — 71010 XR CHEST PORT 1 VW: CPT

## 2017-05-30 PROCEDURE — 99211 OFF/OP EST MAY X REQ PHY/QHP: CPT | Mod: 25

## 2017-05-30 PROCEDURE — 99152 MOD SED SAME PHYS/QHP 5/>YRS: CPT

## 2017-05-30 PROCEDURE — 83615 LACTATE (LD) (LDH) ENZYME: CPT | Performed by: INTERNAL MEDICINE

## 2017-05-30 PROCEDURE — 80053 COMPREHEN METABOLIC PANEL: CPT | Performed by: INTERNAL MEDICINE

## 2017-05-30 PROCEDURE — 99222 1ST HOSP IP/OBS MODERATE 55: CPT | Performed by: CLINICAL NURSE SPECIALIST

## 2017-05-30 PROCEDURE — 84484 ASSAY OF TROPONIN QUANT: CPT | Performed by: INTERNAL MEDICINE

## 2017-05-30 RX ORDER — SILVER SULFADIAZINE 10 MG/G
CREAM TOPICAL DAILY
Status: DISCONTINUED | OUTPATIENT
Start: 2017-05-30 | End: 2017-06-06

## 2017-05-30 RX ORDER — SODIUM CHLORIDE 9 MG/ML
INJECTION, SOLUTION INTRAVENOUS CONTINUOUS
Status: DISCONTINUED | OUTPATIENT
Start: 2017-05-30 | End: 2017-06-02

## 2017-05-30 RX ORDER — LIDOCAINE 40 MG/G
CREAM TOPICAL
Status: DISCONTINUED | OUTPATIENT
Start: 2017-05-30 | End: 2017-05-31

## 2017-05-30 RX ADMIN — HUMAN INSULIN 4 UNITS/HR: 100 INJECTION, SOLUTION SUBCUTANEOUS at 02:48

## 2017-05-30 RX ADMIN — OYSTER SHELL CALCIUM WITH VITAMIN D 2 TABLET: 500; 200 TABLET, FILM COATED ORAL at 07:59

## 2017-05-30 RX ADMIN — CALCIUM CHLORIDE 1 G: 100 INJECTION INTRAVENOUS; INTRAVENTRICULAR at 12:04

## 2017-05-30 RX ADMIN — MINERAL OIL AND WHITE PETROLATUM: 150; 830 OINTMENT OPHTHALMIC at 14:22

## 2017-05-30 RX ADMIN — SODIUM CHLORIDE 100 MG: 9 INJECTION, SOLUTION INTRAVENOUS at 12:44

## 2017-05-30 RX ADMIN — GABAPENTIN 200 MG: 250 SOLUTION ORAL at 21:48

## 2017-05-30 RX ADMIN — MEROPENEM 1 G: 1 INJECTION, POWDER, FOR SOLUTION INTRAVENOUS at 21:46

## 2017-05-30 RX ADMIN — MEROPENEM 1 G: 1 INJECTION, POWDER, FOR SOLUTION INTRAVENOUS at 08:00

## 2017-05-30 RX ADMIN — PANTOPRAZOLE SODIUM 40 MG: 40 INJECTION, POWDER, FOR SOLUTION INTRAVENOUS at 07:59

## 2017-05-30 RX ADMIN — GABAPENTIN 200 MG: 250 SOLUTION ORAL at 08:00

## 2017-05-30 RX ADMIN — Medication 50 MCG/HR: at 20:19

## 2017-05-30 RX ADMIN — CALCIUM CHLORIDE 1 G: 100 INJECTION INTRAVENOUS; INTRAVENTRICULAR at 05:31

## 2017-05-30 RX ADMIN — MIDAZOLAM HYDROCHLORIDE 4 MG/HR: 5 INJECTION, SOLUTION INTRAMUSCULAR; INTRAVENOUS at 15:55

## 2017-05-30 RX ADMIN — EPINEPHRINE 0.07 MCG/KG/MIN: 1 INJECTION PARENTERAL at 21:47

## 2017-05-30 RX ADMIN — FOLIC ACID 1 MG: 1 TABLET ORAL at 07:59

## 2017-05-30 RX ADMIN — VASOPRESSIN 3 UNITS/HR: 20 INJECTION, SOLUTION INTRAMUSCULAR; SUBCUTANEOUS at 01:07

## 2017-05-30 RX ADMIN — GABAPENTIN 200 MG: 250 SOLUTION ORAL at 14:22

## 2017-05-30 RX ADMIN — SILVER SULFADIAZINE: 10 CREAM TOPICAL at 14:41

## 2017-05-30 RX ADMIN — Medication 3 MG: at 08:00

## 2017-05-30 RX ADMIN — MINERAL OIL AND WHITE PETROLATUM: 150; 830 OINTMENT OPHTHALMIC at 06:10

## 2017-05-30 RX ADMIN — HYDROCORTISONE SODIUM SUCCINATE 100 MG: 100 INJECTION, POWDER, FOR SOLUTION INTRAMUSCULAR; INTRAVENOUS at 21:47

## 2017-05-30 RX ADMIN — MINERAL OIL AND WHITE PETROLATUM: 150; 830 OINTMENT OPHTHALMIC at 21:47

## 2017-05-30 RX ADMIN — VANCOMYCIN HYDROCHLORIDE 1250 MG: 10 INJECTION, POWDER, LYOPHILIZED, FOR SOLUTION INTRAVENOUS at 12:44

## 2017-05-30 RX ADMIN — VASOPRESSIN 3 UNITS/HR: 20 INJECTION, SOLUTION INTRAMUSCULAR; SUBCUTANEOUS at 18:22

## 2017-05-30 NOTE — H&P
"  SURGERY HISTORY AND PHYSICAL  5/29/2017    Reason for Consult: Occluded Left Radial Artery   Consult Requested by: Cardiology    HISTORY PRESENTING ILLNESS: This is a 56 year old female with a past history of HTN, RA, VSD s/p repair in 1969s, recent hospitalization for a fib/a flutter/SVT 5/15 - 5/23 who is admitted to CV ICU for therapeutic hypothermia after a cardiac arrest and was incidentally found to have loss of left radial pulse on nursing doppler checks. 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 No DVTs.  Pt was found in respiratory distress and unresponsive last night by her , who initiated CPR. It was continued by EMS until ROSC in the field. She was intubated and brought to [facility]. She required pressors. Coronary angiogram was done, which was normal. Per cath lab report, \" right radial artery, right common femoral artery, right common femoral vein and left common femoral vein.  Unable to advance wire into RRA so abandoned access.\" She is now undergoing therapeutic hypothermia in CV ICU and continues to be on epinephrine and vasopressin drips.      Review of systems:   Unable to assess due to sedation.    PAST MEDICAL HISTORY:  HTN  RA  VSD s/p repair  Afib/flutter/SVT    PAST SURGICAL HISTORY:  Past Surgical History:   Procedure Laterality Date     ANESTHESIA CARDIOVERSION N/A 5/17/2017    Procedure: ANESTHESIA CARDIOVERSION;  ANESTHESIA CARDIOVERSION;  Surgeon: GENERIC ANESTHESIA PROVIDER;  Location: [facility] OR     ARTHRODESIS WRIST  2000    Right wrist     FOOT SURGERY      4 left and 2 right     RELEASE CARPAL TUNNEL BILATERAL       REPAIR VENTRICULAR SEPTAL DEFECT  1969     FAMILY HISTORY:  family history includes Alzheimer Disease in her mother; Blood Disease in her father; Breast Cancer in her mother; Circulatory in her father; DIABETES in her paternal grandmother; Hypertension in her " father and mother.    SOCIAL HISTORY:  Reviewed in chart.   No smoking history  No recreational drug use    ALLERGIES:  Allergies   Allergen Reactions     Penicillins      Tape [Adhesive Tape] Rash       MEDICATIONS:  Prescriptions Prior to Admission   Medication Sig Dispense Refill Last Dose     metoprolol (TOPROL-XL) 100 MG 24 hr tablet Take 1 tablet (100 mg) by mouth daily 30 tablet 3 5/28/2017 at Unknown time     nitrofurantoin, macrocrystal-monohydrate, (MACROBID) 100 MG capsule Take 1 capsule (100 mg) by mouth every 12 hours for 8 days 16 capsule 0 5/28/2017 at Unknown time     amiodarone 400 MG TABS Take 400 mg by mouth 2 times daily for 12 days 24 tablet 0 5/28/2017 at Unknown time     [START ON 6/4/2017] amiodarone (PACERONE/CODARONE) 200 MG tablet Take 1 tablet (200 mg) by mouth daily 30 tablet 3 5/28/2017 at Unknown time     apixaban ANTICOAGULANT (ELIQUIS) 5 MG tablet Take 1 tablet (5 mg) by mouth 2 times daily 60 tablet 3 5/28/2017 at Unknown time     diltiazem 120 MG 24 hr capsule Take 1 capsule (120 mg) by mouth daily 30 capsule 1 5/28/2017 at Unknown time     Calcium Carb-Cholecalciferol 600-800 MG-UNIT TABS Take 2 tablets by mouth every morning   5/28/2017 at Unknown time     Multiple Vitamins-Minerals (WOMENS MULTIVITAMIN PO) Take 1 tablet by mouth daily At bedtime   5/28/2017 at Unknown time     Coenzyme Q10 (COQ-10 PO) Take 1 tablet by mouth daily In the morning   5/28/2017 at Unknown time     gabapentin (NEURONTIN) 100 MG capsule Take 2 capsules (200 mg) by mouth 3 times daily (Patient taking differently: Take 200 mg by mouth Take 2 capsules (200 mg) by mouth every 6 hours (4 am, 10am, 4pm, 10pm)) 180 capsule 3 5/28/2017 at Unknown time     pramipexole (MIRAPEX) 0.25 MG tablet Reported on 4/4/2017 5/28/2017 at Unknown time     traMADol (ULTRAM) 50 MG tablet Take 50 mg by mouth Every 6 hours as needed for pain   5/28/2017 at Unknown time     diphenoxylate-atropine (LOMOTIL) 2.5-0.025 MG per  tablet Take 0.5 tablets by mouth 2 times daily    5/28/2017 at Unknown time     ASPIRIN PO Take 325 mg by mouth daily At bedtime   5/28/2017 at Unknown time     hydroxychloroquine (PLAQUENIL) 200 MG tablet Take 1 tablet by mouth twice daily on Monday, Wednesday, and Friday and 1 tablet once daily by mouth on Tuesday, Thursday, Saturday, Sunday.   5/28/2017 at Unknown time     leflunomide (ARAVA) 20 MG tablet Take 20 mg by mouth daily In the morning   5/28/2017 at Unknown time     folic acid (FOLVITE) 1 MG tablet Take 1 mg by mouth daily.   5/28/2017 at Unknown time     predniSONE (DELTASONE) 1 MG tablet Take 3 mg by mouth daily In the morning   5/28/2017 at Unknown time     METHOTREXate 2.5 MG tablet Take 10 mg by mouth once a week On Tuesdays 5/28/2017 at Unknown time       PHYSICAL EXAMINATION:  Temp:  [89.8  F (32.1  C)-100.8  F (38.2  C)] 91.9  F (33.3  C)  Pulse:  [43] 43  Heart Rate:  [] 112  Resp:  [12-27] 20  BP: ()/() 75/61  MAP:  [60 mmHg-108 mmHg] 62 mmHg  Arterial Line BP: ()/() 85/56  FiO2 (%):  [80 %-100 %] 80 %  SpO2:  [52 %-100 %] 95 %    General/Neuro: Sedated and intubated undergoing therapeutic hypothermia  Pulm: Mechanically ventilated  CV: On two pressors.  Abd: soft; non-distended, groin lines bilaterally  LUE: Diffuse edema up to the shoulder, no tense compartments, blisters and sloughed off epidermis around the antecubital fossa, multiple old IV site bruises, difficult to assess pulses due to edema, cap refill 2-3 sec comparable to contralateral side.    LABS:  Lab Results   Component Value Date    WBC 10.9 05/29/2017    HGB 11.8 05/29/2017    HCT 35.3 05/29/2017    PLT 58 (L) 05/29/2017     05/29/2017    POTASSIUM 3.6 05/29/2017    CHLORIDE 106 05/29/2017    CO2 16 (L) 05/29/2017    BUN 31 (H) 05/29/2017    CR 1.70 (H) 05/29/2017     (H) 05/29/2017     (H) 05/29/2017    SED 22 05/29/2017    DD 17.8 (H) 05/29/2017    NTBNPI 2583 (H)  05/29/2017    TROPONIN 0.19 (HH) 05/29/2017    TROPI 0.912 (HH) 05/29/2017     (HH) 05/29/2017     () 05/29/2017    ALKPHOS 143 05/29/2017    BILITOTAL 1.7 (H) 05/29/2017    INR 4.47 (H) 05/29/2017     Imaging:  Arterial Duplex Left Upper Extremity:     Subclavian: 89 cm/s     Axillary artery: 38 cm/sec     Brachial proximal: 92 cm/sec   Brachial mid: 53 cm/sec  Brachial antecubital: 53 cm/sec     Radial artery: Occluded from the antecubital fossa distally     Ulnar artery proximal: 76 cm/sec  Ulnar artery mid: 26 cm/sec  Ulnar artery distal: 19 cm/sec     Impression: Occluded radial artery from the antecubital fossa  distally. Other arteries are patent.    ===================================  Venous Duplex Left Upper Extremity    No evidence of left upper extremity deep venous thrombosis.  Cephalic and basilic veins were not identified.    ===================================    ASSESSMENT/PLAN:   Amelia Michel is a 56 year old female with a past history of HTN, RA, VSD s/p repair in 1969s, recent hospitalization for a fib/a flutter/SVT (5/15 - 5/23) who is admitted to CV ICU after a cardiac arrest for therapeutic hypothermia and was incidentally found to have loss of left radial pulse on nursing doppler checks. 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 No DVTs. LUE does not appear ischemic on clinical exam. Patient is undergoing cooling and is on two pressors. The cap refill is <3 seconds and is comparable to the contralateral side.     - There is no indication for acute surgical intervention as there seems to be appropriate collateral flow in the left hand.   - Please continue to monitor the LUE. There is significant edema, but compartments are not tense on clinical exam. Please do not hesitate to contact us if the exam changes.     Pt discussed with the Vascular fellow on call.      Emmanuel Guthrie, PGY2  [phone]

## 2017-05-30 NOTE — PROGRESS NOTES
VASCULAR SURGERY PROGRESS NOTE    HPI:  Mrs. Michel is a 56- year old female who was admitted to [facility] on 5/29/2017 following an out-of-hospital cardiac arrest. Vascular Surgery was consulted for left radial arterial occlusion (see below).     SUBJECTIVE: Very limited by sedation.    OBJECTIVE:  Vital signs:  Temp: (!) 91.6  F (33.1  C) Temp src: Esophageal   Heart Rate: 81 Resp: 16 SpO2: 96 % O2 Device: Mechanical Ventilator        Intake/Output Summary (Last 24 hours) at 05/30/17 0927  Last data filed at 05/30/17 0800   Gross per 24 hour   Intake          1764.29 ml   Output             1465 ml   Net           299.29 ml       Vitals:    05/29/17 0704 05/30/17 0400   Weight: 63.5 kg (140 lb) 69.5 kg (153 lb 3.5 oz)       PHYSICAL EXAM:  NEURO/PSYCH: Sedated due to medical condition.  SKIN: Cool secondary to hypothermic protocol.  PULMONARY: Oral intubation; mechanical ventilation.  EXTREMITIES: Right radial and ulnar signals present; no palmar arch. Left lunar signal present, radial monophasic; no palmar arch. Hands are cool. Left upper extremity with extensive skin sloughing and bullae. Right groin with arterial line and transvenous pacer wires; left groin with Thermoguard line.     5/29/2017 ULTRASOUND BILATERAL UPPER EXTREMITIES:  Findings:   Peak systolic velocities:     Left Upper Extremity Arteries:     Subclavian: 89 cm/s     Axillary artery: 38 cm/sec     Brachial proximal: 92 cm/sec   Brachial mid: 53 cm/sec  Brachial antecubital: 53 cm/sec     Radial artery: Occluded from the antecubital fossa distally     Ulnar artery proximal: 76 cm/sec  Ulnar artery mid: 26 cm/sec  Ulnar artery distal: 19 cm/sec     Impression: Occluded radial artery from the antecubital fossa  distally. Other arteries are patent.    5/22/2017 CARDIAC MRI:  IMPRESSION:  1.  Normal left ventricular size and low-normal systolic function with  a calculated ejection fraction of  55 %.  2.  Mildly dilated right ventricular size and normal  right ventricular  systolic function with a calculated ejection fraction of 51%. There is  a focal area of dyskinesis in the right ventricular outflow tract, the  etiology of which is unclear. If the patient's clinical history is  compatible, this could represent the site of prior surgical  modification of the RVOT.   3.  Normal variant pulmonary venous drainage into the left atrium with  a common origin of the left pulmonary veins and measurements as  described below.   4.  Severe biatrial enlargement.  5.  Severe eccentric tricuspid regurgitation, the mechanism of which  is not clearly demonstrated.  6.  Mild to moderate mitral regurgitation.  7.  There are no intracardiac thrombi; specifically, the left atrial  appendage is free of thrombus.  8.  On delayed enhancement imaging, there is hyperenhancement at the  RV septal insertion site. This is a nonspecific finding which can be  seen in the setting of diseases involving the right ventricle.    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/2017  - has monophasic left radial with intact ulnar signal; monophasic radial could be wrapping around from the arch  - no audible left palmar arch; however hand does not appear acutely threatened at this time  - please keep hands warm with warm towels, blankets, or mitts; continue frequent vascular exams  - last documented Heparin exposure 5/16/2017; unclear if she had Heparin prior to coronary angiography on 5/29/2017  - Cardiac MRI on 5/22/2017 did not demonstrate intra-cardiac thrombus  - she is not currently on anticoagulation, may need to consider Argatroban; will discuss further recommendation with Dr. Montanez  - closely monitor coagulation studies and liver enzymes  - would cover open areas of left arm with adaptic and wrap loosely with Kerlix; change twice daily  - will staff with Dr. Montanez    NEED FOR ONGOING ANTIBIOTICS:  Per  primary team  URINARY CATHETER: Per primary team  DVT PROPHYLAXIS:  Not currently on prophylactic or therapeutic dosing  GI PROPHYLAXIS: Per primary team  BOWEL REGIMEN: Per primary team  ANTI-PLATELET: Not currently on  ANTI-COAGULANT: Not currently on  STATIN: Does not take at home; would avoid with transaminitis  TOBACCO CESSATION:  Unknown    Please contact Dr. Montanez's Vascular Surgery Service with questions or concerns.    Kelly Guerrier APRN, CNP  Division of Vascular Surgery  [facility]  Pager: [phone]

## 2017-05-30 NOTE — PROGRESS NOTES
"Vascular Surgery Note     Patient seen and examined.  The left upper extremity is cool. L= R (hypothermia). Bullous skin changes at the left upper extremity. With antecubital denuded skin. Triphasic brachial artery signal. No dopplerable radial artery signal. No palmar arch or ulnar signal. (may be limited by edema and hypotension) Ultrasound probe does demonstrate pulsatile flow in the ulnar artery.   BP (!) 75/61  Pulse (!) 43  Temp 92.8  F (33.8  C)  Resp 20  Ht 4' 10\"  Wt 140 lb  SpO2 100%  BMI 29.26 kg/m2    Impression: 55 yo female with history of remote VSD, atrial fibrillation on epixaban s/p cardiac arrest today with return or ROSC after ACLS protocol currently on pressor support and hypothermia protocol.  Differential includes acute vs subacute cardio-embolic disease, HIT, vs vasospasm secondary to hypothermia, hypotension and pressors.     Plan:     1. Patient is hypotensive, hypothermic and acidotic. From coags would assume coagulopathic. Monitor platelets. Chart reviewed does not appear patient has received heparin. Would recommend Hematology Consult in am for ? HIT. Drop from 104-58 could also represent consumptive coagulopathy.  2. Bullous changes and swelling is likely secondary to IV infiltration.  No evidence of compartment syndrome.  3. Please wrap the left hand with Rooke MITT vs warm blankets  4. Continue vascular pulse/signal checks.   5. ECHO to evaluate cardiac source when appropriate.  6. Vascular Surgery will follow.    Janusz Thompson MD   Vascular Surgery Fellow   Pager: [phone]  "

## 2017-05-30 NOTE — PHARMACY-VANCOMYCIN DOSING SERVICE
Pharmacy Empiric Dose Change Per Policy    Original Dose Ordered: Vancomycin 1250 mg IV every 18 hours  Dose Changed To: Vancomycin 1250 mg IV every 24 hours    This dose change was based on the pharmacist's assessment of this patient's age, weight, concurrent drug therapy, treatment goals, whether patient's creatinine clearance adequately indicates renal function (factoring in age, muscle mass, fluid and clinical status), and, if applicable, prior pharmacokinetic data.    Creatinine Clearance= Estimated Creatinine Clearance: 42.5 mL/min (based on Cr of 1.48).     Will continue to follow and modify dosage according to levels, organ function and clinical condition.    Zuleima Shankar, PharmD  May 29, 2017

## 2017-05-30 NOTE — CONSULTS
Electrophysiology Consultation Note   EP Attending: .   Reason for consultation: AF, post arrest .   Provider requesting consultation: CAMILLE Woo.  Date of Service: 5/30/2017      HPI:   Ms. Michel is a 56 year old female who has a past medical history significant for VSD s/p repair 1969, RA, HTN, and insomnia. She was recently admitted from 5/15/17-5/23/17 with atrial tachyarrhythmias (SVT, AF, AFL) with symptoms of palpitations, fatigue, and nausea that she had intermittently starting 3 weeks prior to admission. An echo today shows LVEF 40-45%, mildly reduced RVEF, moderate biatrial enlargement, mild mitral regurgitation, and moderate tricuspid regurgitation. Prior CMRI from 2015 showed normal function and no significant valvular abnormalities. She was started on Eliquis. She underwent a BRE/DCCV on 5/17/17 c/b hypotension. She then had SVT HR 170s and then went into AF/AFL all within 24 hours after DCCV. She was started on Sotalol and chemically cardioverted. However, she had nausea and thought it was from the Sotalol so this was stopped. She reverted back to AF/AFL and a rate control strategy was adopted. She proved difficult to rate control and remained highly symptomatic. Therefore, she was started on amiodarone. She then converted back to sinus. Patient received an intial IV load followed by 400mg BID plan for 2 weeks then plan to go to 200 mg daily thereafter. She continued to report fatigued and nausea despite being in sinus. Given difficult to control arrhthymias, CMRI done showed LVEF 55%, mildly dilated RV with focal area of dyskinesis in RVOT, severe bi-atrial enlargement, severe TR, mild/moderate MR, and DE at RV septal insertion site. RFA was discussed but was defered as patient had a UTI at the time with ESBL. CRP was elevated and remained elevated 72 at discharge. She was sent with Macrobid.  She then presented to [facility] via EMS after suffering an out of hospital cardiac arrest.  Her  found her gasping for air and unresponsive in bed. He moved her off the bed, started CPR, and activated EMS. Upon EMS arrival, she was reported to be in shockable rhythm. She was externally defibrillated and had ROSC in the field. She was intubated and brought to [facility]. In the U, she was hypotensive and found to be in escape rhythm 43 bpm. She was taken emergently to cath lab where coronary angiogram showed normal coronary arteries. Temporary pacemaker was placed in RA given sinus arrest. She was also placed on therapeutic hypothermia. She required epinephrine and vasopressin gtts. She was admitted to CVICU for further evaluation and management. Her  reports that she had a syncopal episode while sitting in a chair last Friday 5/26/17 that she did not seek care for. He also reports that she had been having nausea, diarrhea, and intermittent vomiting over the last week and generally had not been feeling well over the last month. She has also had saddle anesthesia with lower extremity numbness that has been evaluated by neuro as an outpatient. She was started on gabapentin but no conclusive reason. LFTs elevated likely shock liver. CELSA with Scr 1.62, GFR 33. Trop 0.569 and trended down to 0.457. . Lactic acid elevated throughout the night, still elevated but trending down. Elevated d-dimer. WBC 9.0, Plt 56. INR 3.92. She had episode of AF overnight that was rate controlled that spontaneously terminated. CT chest showed extensive consolidation in the right lower and left lower lobe, may represent pulmonary edema and/or infection. Blood cultures have been NTD.  When temporary pacemaker paused showed sinus underlying 50-60's. She continues to be intubated and sedated.     Past Medical History:   Past Medical History:   Diagnosis Date     HTN (hypertension)      Primary pulmonary hypertension (H)      Rheumatoid arthritis(714.0)      Past Surgical History:   Past Surgical History:   Procedure  Laterality Date     ANESTHESIA CARDIOVERSION N/A 5/17/2017    Procedure: ANESTHESIA CARDIOVERSION;  ANESTHESIA CARDIOVERSION;  Surgeon: GENERIC ANESTHESIA PROVIDER;  Location: [facility] OR     ARTHRODESIS WRIST  2000    Right wrist     FOOT SURGERY      4 left and 2 right     RELEASE CARPAL TUNNEL BILATERAL       REPAIR VENTRICULAR SEPTAL DEFECT  1969     Allergies: Per MAR     Allergies   Allergen Reactions     Penicillins      Tape [Adhesive Tape] Rash     Medications:   Per MAR current outpatient cardiovascular medications include: Prescriptions Prior to Admission   Medication Sig Dispense Refill Last Dose     metoprolol (TOPROL-XL) 100 MG 24 hr tablet Take 1 tablet (100 mg) by mouth daily 30 tablet 3 5/28/2017 at Unknown time     nitrofurantoin, macrocrystal-monohydrate, (MACROBID) 100 MG capsule Take 1 capsule (100 mg) by mouth every 12 hours for 8 days 16 capsule 0 5/28/2017 at Unknown time     amiodarone 400 MG TABS Take 400 mg by mouth 2 times daily for 12 days 24 tablet 0 5/28/2017 at Unknown time     [START ON 6/4/2017] amiodarone (PACERONE/CODARONE) 200 MG tablet Take 1 tablet (200 mg) by mouth daily 30 tablet 3 5/28/2017 at Unknown time     apixaban ANTICOAGULANT (ELIQUIS) 5 MG tablet Take 1 tablet (5 mg) by mouth 2 times daily 60 tablet 3 5/28/2017 at Unknown time     diltiazem 120 MG 24 hr capsule Take 1 capsule (120 mg) by mouth daily 30 capsule 1 5/28/2017 at Unknown time     Calcium Carb-Cholecalciferol 600-800 MG-UNIT TABS Take 2 tablets by mouth every morning   5/28/2017 at Unknown time     Multiple Vitamins-Minerals (WOMENS MULTIVITAMIN PO) Take 1 tablet by mouth daily At bedtime   5/28/2017 at Unknown time     Coenzyme Q10 (COQ-10 PO) Take 1 tablet by mouth daily In the morning   5/28/2017 at Unknown time     gabapentin (NEURONTIN) 100 MG capsule Take 2 capsules (200 mg) by mouth 3 times daily (Patient taking differently: Take 200 mg by mouth Take 2 capsules (200 mg) by mouth every 6 hours (4 am, 10am,  4pm, 10pm)) 180 capsule 3 5/28/2017 at Unknown time     pramipexole (MIRAPEX) 0.25 MG tablet Reported on 4/4/2017 5/28/2017 at Unknown time     traMADol (ULTRAM) 50 MG tablet Take 50 mg by mouth Every 6 hours as needed for pain   5/28/2017 at Unknown time     diphenoxylate-atropine (LOMOTIL) 2.5-0.025 MG per tablet Take 0.5 tablets by mouth 2 times daily    5/28/2017 at Unknown time     ASPIRIN PO Take 325 mg by mouth daily At bedtime   5/28/2017 at Unknown time     hydroxychloroquine (PLAQUENIL) 200 MG tablet Take 1 tablet by mouth twice daily on Monday, Wednesday, and Friday and 1 tablet once daily by mouth on Tuesday, Thursday, Saturday, Sunday.   5/28/2017 at Unknown time     leflunomide (ARAVA) 20 MG tablet Take 20 mg by mouth daily In the morning   5/28/2017 at Unknown time     folic acid (FOLVITE) 1 MG tablet Take 1 mg by mouth daily.   5/28/2017 at Unknown time     predniSONE (DELTASONE) 1 MG tablet Take 3 mg by mouth daily In the morning   5/28/2017 at Unknown time     METHOTREXate 2.5 MG tablet Take 10 mg by mouth once a week On Tuesdays 5/28/2017 at Unknown time     No current outpatient prescriptions on file.     Current Facility-Administered Medications   Medication Dose Route Frequency     sodium chloride (PF)  3 mL Intracatheter Q8H     pantoprazole  40 mg Intravenous Daily     gabapentin  200 mg Oral or Feeding Tube TID     predniSONE  3 mg Oral Daily     folic acid  1 mg Oral Daily     calcium carb 1250 mg (500 mg [place])/vitamin D 200 units  2 tablet Oral QAM     senna-docusate  1-2 tablet Oral BID     artificial tears   Both Eyes Q8H     vancomycin (VANCOCIN) IV  1,250 mg Intravenous Q24H     meropenem  1 g Intravenous Q12H     Family History:   Family History   Problem Relation Age of Onset     Breast Cancer Mother      Hypertension Mother      Alzheimer Disease Mother      Hypertension Father      Blood Disease Father      Lymphoa     Circulatory Father      A Fib     DIABETES Paternal  "Grandmother      Social History:   Social History   Substance Use Topics     Smoking status: Never Smoker     Smokeless tobacco: Never Used     Alcohol use Yes      Comment: Glass of wine two evenings a night       ROS:   Unable to obtain as patient is intubated and sedated.     Physical Examination:   VITALS: BP (!) 75/61  Pulse (!) 43  Temp (!) 91.6  F (33.1  C)  Resp 16  Ht 1.473 m (4' 10\")  Wt 69.5 kg (153 lb 3.5 oz)  SpO2 96%  BMI 32.02 kg/m2  GENERAL APPEARANCE: intubated, sedated.   HEENT: MMM.   NECK: Supple.   CHEST: Crackles throughout, intubated.   CARDIOVASCULAR: S1S2, Reg, soft murmur.   ABDOMEN: BS+, soft.   EXTREMITIES: 1+ BLE edema.   NEURO: sedated.   PSYCH: sedated.   SKIN: pale, Warm and dry.   Data:   Labs:  BMP  Recent Labs  Lab 17  1635 17  1055   * 142 142 138   POTASSIUM 3.3* 3.6 3.8 3.7   CHLORIDE 105 106 105 105   VIKTORIYA 8.1* 8.6 6.9* 6.5*   CO2 21 16* 21 18*   BUN 31* 31* 26 20   CR 1.62* 1.70* 1.48* 1.24*   *  152* 198*  200* 155* 77  83     CBC  Recent Labs  Lab 17  1055 17  0720   WBC 9.0 10.9 10.2 5.2   RBC 3.08* 3.27* 3.21* 2.68*   HGB 10.9* 11.8 11.5* 9.4*   HCT 32.7* 35.3 35.2 29.7*   * 108* 110* 111*   MCH 35.4* 36.1* 35.8* 35.1*   MCHC 33.3 33.4 32.7 31.6   RDW 18.0* 18.1* 18.0* 17.8*   PLT 56* 58* 80* 104*     INR  Recent Labs  Lab 17  1300 17  1055   INR 3.92* 4.47* 3.93* 3.08*     Cholesterol (mg/dL)   Date Value   2016 106   03/15/2016 84 (A)   2011 136   2011 136     Cholesterol/HDL Ratio (no units)   Date Value   2011 3.0   2011 3.0     HDL Cholesterol (mg/dL)   Date Value   2016 10 (L)   03/15/2016 2   2011 51   2011 51     LDL Cholesterol Calculated (mg/dL)   Date Value   2016 46   03/15/2016 25   2011 69   2011 69     EK/29/17 ECHO: "   Interpretation Summary  Left ventricular size is normal.  The Ejection Fraction is estimated at 40-45%.  Global right ventricular function is mildly reduced.  There has been no change.    5/16/17 ECHO:  Interpretation Summary  Normal left ventricular size and thickness with mildly reduced systolic  function. EF 45-50% (traced at 48%).  S/P VSD repair. Ventricular septum appears intact by color Doppler.  Normal right ventricular size with mildly reduced function.  Moderate biatrial enlargement is present.  Mild mitral regurgitation and moderate tricuspid regurgitation. The mechanism  of tricuspid regurgitation appears to be tricuspid valve prolapse.  No pericardial effusion.     Compared to the prior study 1/14/2015 the LV and RV function are now reduced  and both the MR and most notably, the TR are new.    5/22/17 CMRI:  IMPRESSION:  1.  Normal left ventricular size and low-normal systolic function with  a calculated ejection fraction of  55 %.  2.  Mildly dilated right ventricular size and normal right ventricular  systolic function with a calculated ejection fraction of 51%. There is  a focal area of dyskinesis in the right ventricular outflow tract, the  etiology of which is unclear. If the patient's clinical history is  compatible, this could represent the site of prior surgical  modification of the RVOT.   3.  Normal variant pulmonary venous drainage into the left atrium with  a common origin of the left pulmonary veins and measurements as  described below.   4.  Severe biatrial enlargement.  5.  Severe eccentric tricuspid regurgitation, the mechanism of which  is not clearly demonstrated.  6.  Mild to moderate mitral regurgitation.  7.  There are no intracardiac thrombi; specifically, the left atrial  appendage is free of thrombus.  8.  On delayed enhancement imaging, there is hyperenhancement at the  RV septal insertion site. This is a nonspecific finding which can be  seen in the setting of diseases  involving the right ventricle.  Assessment:   Ms. Michel is a 56 year old female who has a past medical history significant for VSD s/p repair 1969, RA, HTN, insomnia, and atrial tachyarrhythmias. She was recently admitted from 5/15/17-5/23/17 with symptomatic refractory atrial tachyarrhythmias (SVT, AF, AFL) found to have LVEF 40-45% on echo, 55% on CMRI with severe TR and mild/moderate MR. Found to have UTI during admission. RFA was discussed but deferred due to UTI, elevated CRP, and need for further work up. Ultimately, discharged on Eliquis and amiodarone with plan for close EP follow up. She was now admitted after suffering an out of hospital cardiac arrest requiring externally defibrillation with succesful and had ROSC in the field. Presenting ECG showed escape rhythm 43 bpm. Coronary angiogram showed normal coronary arteries. Temporary pacemaker was placed, and she was placed on therapeutic hypothermia. Further work up shows shock liver, CELSA, elevated CRP, elevated lactic acid, and low plts. CT chest showed extensive consolidation in the right lower and left lower lobe, may represent pulmonary edema and/or infection. She continues to be intubated and sedated.   EP Recommendations:  Cardiac arrest of unknown etiology  - Unlikely that amiodarone would be cause of arrest. Presenting rhythm did show sinus shut down; however, she had viable escape that should not result in VT/VF cardiac arrest. Agree with holding amiodarone in the setting of bradycardia. Agree with temporary AAI pacing. We will aim to place screw in lead to provide more stable pacing.   - She appears to have some systemic inflammatory processes which is not well defined at this stage. Only known UTI and now PNA. Consider Rheumatology consult for evaluation of possible RA/SLE flair as possible contributing factors.    - Would need to consider ICD if patient makes viable recovery and no reversible causes of arrest identified.     We will continue to  follow along with you.   The patient states understanding and is agreeable with plan.   Thank you much for allowing us to participate in the care of this pleasant patient.     The patient was discussed w/ Dr. Eaton.  The above note reflects our joint plan.    ELIZABETH Verde CNP  Electrophysiology Consult Service  Pager: [phone]    EP STAFF NOTE  I have seen and examined the patient as part of a shared visit with GERA Verde NP.  I agree with the note above. I reviewed today's vital signs and medications. I have reviewed and discussed with the advanced practice provider their physical examination, assessment, and plan   Briefly, patient presenting with cardiac arrest reportedly shockable per EMS, no recordings, sinus dysfunction which is unlikely cause of CA, inflammatory picture.  My key history/exam findings are: intubated,cooled.   The key management decisions made by me: Atrial lead, recommend rhumatology and ID work-up, hold amio.    Juan Eaton MD [facility]  Cardiology - Electrophysiology

## 2017-05-30 NOTE — PLAN OF CARE
Problem: Goal Outcome Summary  Goal: Goal Outcome Summary  Outcome: No Change  D: Pt admitted yesterday after witnessed arrest, Remains intubated/sedated on hypothermia protocol with two vasopressors.  I/A:  Labile MAPs most of evening more prominent while in Afib/flutter, MAPS 55-62 range requiring up-titration of Epi to 0.08 and Vaso as high as 3. BP stable when Paced at 80. Able to wean Epi to 0.04 and Vaso to 2. Pt remains cool via thermaguard at temp of 34 as targeted.  Unable to doppler left radial pulse, ultrasound confirmed no radial pulse, Vascular consulted and in to assess. +Brachial pulse and able to find + ulnar pulse via doppler. Suggested to keep extremity warm. BLE and R radial pulses present via doppler.  Lactate trended and peaked at 16, has since gone down to 10. Two amp NA Bicarb given during the night for bicarb of 16, level has gone up to 23.  Vent settings adjusted based on ABG's, rate changed to 16 and PEEP down to 10, ABG normalizing.  INR up to 4.4 and platelets in 50's, MD notified no transfusions given overnight, INR 3.9 this am.  2 gm calcium given overnight, K 3.3 not replaced due to hypothermia protocol. Adequate UOP ~60-75/ hour.  Pt remains adequately sedated on Versed at 4 and Fentanyl at 50, not moving extremities so restraints discontinued. Pt does open eyes with repositioning and weak cough with suctioning, pupils 2 mm sluggish/equal, EEG monitoring continues.  P: Continue to closely monitor, continue to cool per protocol with re-warming later today.

## 2017-05-30 NOTE — CONSULTS
"[facility]  Palliative Care Consultation         Amelia Michel MRN# [id number]   Age: 56 year old YOB: 1960   Date of Admission: 5/29/2017    Reason for consult: Goals of care  Decisional support  Patient and family support       Requesting physician/service: Rick Nguyễn MD       Recommendations        -Appreciate assessment and involvement of Palliative Care  for family coping and adjustment to serious illness  -Appreciate involvement of    -Palliative Care will continue to follow for additional family support and assistance with decision making    POLST -patient full code    ELIZABETH Fernandes CNS  Palliative Care Consult Team  Pager: [phone]    55 minutes spent with patient, with >50% counseling and in care coordination.        Disease Process/es & Symptoms     Cardiogenic shock  Hypoxic respiratory failure  S/P Cardiac arrest  Concern for anoxic brain injury  Afib RVR  ESBL UTI  Shock liver  VSD s/p repair in 1969       Support/Coping   (We typically ask each of our patients the following questions:)    How do you make sense of this? Not discussed  Are you Shinto? Spiritual? Not so much? Spiritual health seeing patient  Are you at peace? Not discussed  What are your concerns, medical and non-medical, that are not being addressed? None  Who are your \"go-to\" people when you need support? Family    Plan for psychosocial/spiritual support/follow-up from palliative team: Will discuss case during palliative interdisciplinary team rounds and involve LICSW and  as needed.     Decision-Making & Goals of Care     Discussion/counseling today about prognosis/goals of care/decisions:   Visited patient and family, including , patients sister, and her brother in law.  was also present. I introduced palliative care team and ways in which we can be helpful.      discussed some of the events that lead up to today, he " understands pretty well medically what is going on with Amelia. He reflects a lot on the events of the day when she arrested and the critical decisions he needed to make. He has a strong belief in God which seems to be supportive for him. He understands right now we are taking things moment by moment. We also discuss how moving forward we are a team that can be helpful in supporting him in the decision making process.     Patient has a completed health care directive available in the chart (Y/N): None on file, does report that there is one in the paper chart and they had recently completed it.  Health care agent (only if patient has an available, complete HCD):  There is no POLST (Physician orders for life-sustaining treatment) form on file for this patient  Code Status: Full code   Patient has decision-making capacity (Y/N): No    Coordination of Care     Findings & plan of care discussed with: Cardiology team  Follow-up plan from palliative team: will continue to follow  Thank you for involving us in the patient's care.           Chief Complaint     Family support and coping         History of Present Illness     History obtained from: chart review    Amelia Michel is a 56 year old female with a history of VSD s/p repair in 1969, RA, HTN, and recently diagnosed afib RVR which she was hospitalized for recently this month with required medication therapy and DCCV. She presented with an out of hospital arrest, unclear of how long it took but she obtained ROSC on the field. Additionally she was intubated in the field. She was transported to [facility] in which she had coronary angiogram which was normal. The underlying etiology of this event includes possible surgical scaring, aspiration PNA,and electrolyte abnormality. She had recently also had nausea, vomiting, and diarrhea the days prior to her arrest on 5/29. She was cooled, placed on two pressors, and placed on ECMO. Also while she is here she had a TPM was placed.  Vascular surgery consulted for left radial arterial occlusion and for now is going to monitor. Her EEG showed severe generalized slowing with superimposed fast activity, no clinical or electrographic seizures were observed.    Palliative Care team consulted 5.29 for patient with a out of hospital arrest for family support, potential for decision making, and goals of care.          Past Medical History:   I have reviewed this patient's past medical history  This patient  has a past medical history of HTN (hypertension); Primary pulmonary hypertension (H); and Rheumatoid arthritis(714.0).           Past Surgical History:   I have reviewed this patient's past surgical history  This patient  has a past surgical history that includes Repair ventricular septal defect (1969); Release carpal tunnel bilateral; Arthrodesis wrist (2000); Foot surgery; and Anesthesia cardioversion (N/A, 5/17/2017).            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           Allergies:   All allergies reviewed and addressed  This patient is allergic to is allergic to penicillins and tape [adhesive tape].           Medications:   I have reviewed this patient's medication profile and medications during this hospitalization    Medications of Note  Gabapentin 200 mg 3 times a day  Protonix daily  Prednisone daily  Fentanyl  drip  Versed drip  Vasopressin  Tylenol PRN             Review of Systems:   The comprehensive review of systems is negative other than noted here and in the HPI.    Palliative Symptom Review (0=no symptom/no concern, 1=mild, 2=moderate, 3=severe):   MARSHA given patient intubated and sedated            Physical Exam:   Vitals were reviewed  Temp: 92.7  F (33.7  C) Temp src: Esophageal   Heart Rate: 80 Resp: 16 SpO2: 93 % O2 Device: Mechanical Ventilator    Constitutional: intubated and sedated  Lungs: No increased work of breathing  Abdomen: hypoactive bowel sounds, soft, non-distended  Neurologic: sedated, unable to communicate  Neuropsychiatric: MARSHA given AMS         Data Reviewed:     ROUTINE ICU LABS (Last four results)  CMP  Recent Labs  Lab 05/30/17  0939 05/30/17  0347 05/29/17  2228 05/29/17  1635 05/29/17  1055 05/29/17  0720   NA  --  145* 142 142 138 131*   POTASSIUM  --  3.3* 3.6 3.8 3.7 5.2   CHLORIDE  --  105 106 105 105 103   CO2  --  21 16* 21 18* 17*   ANIONGAP  --  20* 20* 16* 15* 11   * 148*  152* 198*  200* 155* 77  83 191*   BUN  --  31* 31* 26 20 16   CR  --  1.62* 1.70* 1.48* 1.24* 1.03   GFRESTIMATED  --  33* 31* 36* 45* 55*   GFRESTBLACK  --  40* 38* 44* 54* 67   VIKTORIYA  --  8.1* 8.6 6.9* 6.5* 7.1*   MAG  --  2.2 2.4* 2.4* 1.8  --    PHOS  --  4.5  --   --   --   --    PROTTOTAL  --  4.3* 4.3*  --  4.6* 4.4*   ALBUMIN  --  1.7* 1.9*  --  1.9* 1.8*   BILITOTAL  --  1.6* 1.7*  --  1.3 0.6   ALKPHOS  --  135 143  --  151* 95   AST  --  862* 983*  --  1186* Canceled, Test credited Unsatisfactory specimen - hemolyzedNOTIFIED NATHAN CAST RN 0827 5/29/17 BY DL   ALT  --  920* 922*  --  732* 48     CBC  Recent Labs  Lab 05/30/17  0347 05/29/17  2228 05/29/17  1055 05/29/17  0720   WBC 9.0 10.9 10.2 5.2   RBC 3.08* 3.27* 3.21* 2.68*   HGB 10.9* 11.8 11.5* 9.4*   HCT 32.7* 35.3 35.2 29.7*   * 108* 110* 111*   MCH 35.4* 36.1* 35.8* 35.1*   MCHC 33.3 33.4 32.7 31.6   RDW 18.0* 18.1* 18.0*  17.8*   PLT 56* 58* 80* 104*     INR  Recent Labs  Lab 05/30/17  0347 05/29/17  2228 05/29/17  1300 05/29/17  1055   INR 3.92* 4.47* 3.93* 3.08*     Arterial Blood Gas  Recent Labs  Lab 05/30/17  0939 05/30/17  0930 05/30/17  0821 05/30/17  0553 05/30/17  0347   PH 7.46*  --  7.48* 7.45 7.45   PCO2 39  --  35 33* 30*   PO2 63*  --  71* 87 89   HCO3 27  --  26 23 21   O2PER 40% 40% 40.0 50.0 50.0

## 2017-05-30 NOTE — PROGRESS NOTES
Cardiology Progress Note    Overnight/subj:   - Increase lactate overnight, no obvious source  - started on vaso   - absent radial pulse on LUE, ulnar present. Low c/f compartment per vasc  - dropping plts     VS reviewed: Notable for temp: 92.8, HR , MAP 68-87, oxygenating on 99%, paO2- 87 on 16/450/10/50  Tele: A paced. Runs of Afib/flutter at 120s of 2hrs x2.     Access:  RFV- TPM  RFA- 4Fr a-line  LFV- TLC     Wt: on admit 63.5, yday 63.5, today 69.5  I/O: 1.1 in / 570 out yday. Since  in / 775 out    Drips:   Epi 0.06  Vaso 2.0    PO Cardiac Meds: None  Other meds: Meropenem, vanco, gabapen, ppi, prednisone    Radiology Imaging  CXR  - Increase bilateral interstial markings   - TPM in RA  - ETT 5cm above ermelinda    US Upper Ex  Art :Impression: Occluded radial artery from the antecubital fossa  distally. Other arteries are patent.    Venous: IMPRESSION:  1.  No evidence of left upper extremity deep venous thrombosis.  Cephalic and basilic veins were not identified.    Cardiology Studies:  TTE  Interpretation Summary  Left ventricular size is normal.  The Ejection Fraction is estimated at 40-45%.  Global right ventricular function is mildly reduced.  There has been no change.    ECG  A paced    Labs: Reviewed in epic    Phys exam:  GEN: NAD  Pulm: course breath sounds b/l but improved  Cardiac: JVP not appreciated, RRR, no murmurs  Vascular: - HERNANDO and dopplerable L ulnar, R radial, R ulner, DP and PT b/l pulses. Increase swelling of L upper arm   GI: soft, non distended    ASSESSMENT/PLAN:  Amelia Michel is a 56 year old with hx VSD s/p surgical repair (1969), RA, HTN and recently dx Afib/flutter/SVT who is presenting after a cardiac arrest. Initial rhythm at EMS arrival was shockble. ROSC obtained on the field. Initial EF rhythm was ventriclar escape. S/p Coronary angiogram with no angiographic evidence of disease. Temp PM place in the CCL in the RA, patient did not tolerate RV pacing.     Cause  of arrest is unclear, i suspect sinus arrest in the setting of amiodarone, but primary VT/VF from prior surgical scar also possible. Aspiration PNA also possible. Electrolyte abnormality unlikely given presenting labs.      Patient with persistent lactate and pressor requirement. Now also with new thrombocytopenia and L radial artery occlusion.     Neuro  # Likely anoxic brain injury   # Sedation  -- Cooling protocol  -- fentanyl, versed for sedation     CV  # hx VSD s/p Repair  # Atrial Fib, flutter and SVT (with difficult to control rates)  # Sinus arrest  # Cardiac Arrest  # Cardiogenic Shock, EF 40-45%  # RV Strain on echo  -- d/c amiodorone, BB and diltiazem for now  -- TPM via RFV   -- pace at 80  -- Continue epi as needed  -- serial SVO2  -- CVP to assess volume status   -- EP consult  -- RA screw in lead placement   -- possible Bx and SG placement  -- hold AC      Pulm  # Hypoxic respiratory failure, likely 2/2 cardiogenic shock vs aspiration PNA vs ARDS  -- Lung protective ventilation, IBW- 45.5, currently TV- 400, ~9ml/kg  -- changing tidal volume 350~7.7ml/kg  -- daily pressure support   -- CVP  -- Abx      ID   # ESBL UTI  # Aspiration PNA   -- Vanco per pharm  -- Meropenem 1G q8h  -- Bcx, Ucx, Rcx, NTD  -- fever, wbc curve      Rheum   # RA   -- Hold meds for now   -- Will send SLE serologies for low chance of inflammatory flair  -- continue prednisone, consider stress dosing   -- checking cortisol level      Renal   CELSA, oliguric   UA c.w atn   Continue to trend   Avoid nephrotoxic meds   Holding off on diuresis      GI  # Shock Liver   # Feeding   -- trend LFTs  -- no indication for viral serologies at this time  --start feeds NGT    # Fluid around pancreas  -- lipase     Heme  # Coagulopathy, likely shock liver  # Previously on apixaban   # Thrombocytopenia, of unclear cause  -- continue to monitor   -- no vitK or k-centra at this time   -- Blood smear, Heme consult pending smear     Endo    Hypoglycemia  continue to monitor   On d5w and insulin gtt     Ppx   PPI  No anticoagulation at this time      Patient seen and discuss with Dr. Angela and Dr. Israel Nguyễn MD   Cardiology Fellow   *[phone]      ATTENDING NOTE:  Patient has been seen and evaluated by me on 05/30/2017. I have reviewed the documentation above.  I have reviewed today's vital signs, medications, labs, and imaging results.  I have reviewed and edited, as necessary, the history, review of systems, physical examination, and assessment and plan.  I have discussed my assessment and plan with the resident.  Amelia Michel is a 56 year old female with risk factor profile (+) HTN, (-) DM, (-) hypercholesterolemia, (-) tobacco use, (-) fam Hx premature CAD, presented [facility] with palpitations, fatigue, and SOB and was found to be in new onset AF with vent rate 179.  She was initially treated with Adenosine and then with Diltiazem.  She was anticoagulated with Heparin.  She subsequently was diagnosed with A flutter.  EP consulted.  She was cardioverted today with BRE guidance.  She reverted to NSR but was hypotensive post cardioversion.  ECHO showed LVEF 45%-50%, global hypokinesis, RV dysfunction, mild MV, moderate TR. I suspected tachycardia indicated cardiomyopathy.  She was treated with Apixaban and underwent BRE guided cardioversrion.  The patient developed hypotension post cardioversion require IV Phenylephrine 200 mcg, IV Vasopressin 5 U, IV Atropine 1 mg, and IV NS 1 liter.  She had a subsequent cardiac MRI with LVEF 55%, RVEF 51%, a focal area of dyskinesis in the right ventricular outflow tract, the etiology of which was unclear. If the patient's clinical history is  compatible, this could represent the site of prior surgical.  There was severe biatrial enlargement, severe eccentric tricuspid regurgitation, the mechanism of which wass not clearly demonstrated, and mild to moderate mitral regurgitation.   She was discharged on Amiodarone after not being able to tolerate Sotalol and was in AF at that time.    The patient presented to [facility] yesterday with a reported out of hospital cardiac arrest.  She reportedly had a shockable rhythm in the field.  She had CPR and given Epi.  She was intubated in the field and had ROSC.  She was brought to the ER.  She had SBP in 40s and HR in 50s.  She was given Epi and underwent emergent coronary angiography (normal arteries).   She was given IV Dopa in the cath lab. ECMO was contemplated due ongoing hypoxemia.  She was in sinus arrest with ventricular escape rhythm and she underwent A-pacing (unable to tolerate V-pacing).  She was transferred to CCU and started on IV Vasopressin and continued on IV Epi.  She had Thermoguard placed for hypothermia and is currently being cooled.  Exam noteworthy for comatose state, LUE edema, loss of Lf radial pulse.  She was given IV Miripenem and IV Vanco (E. Coli UTI on prior admission had been treated with Macrobid).  CT chest showed extensive consolidation in the right lower and left lower lobe, may represent pulmonary edema and/or infection.  ECHO showed LVEF 40-45%, mildly reduced LV function.  Labs noteworthy for plt 50s and lactate 10.  Consider switching pacer from RFV to Rt IJ with screw in lead today.  Then, consider Bx of RV in case there is infiltrative cardiomyopathy that might explain the bradycardia that is not responsive to pressors after which a [place] can be placed via the RFV.   This would allow avoiding of additional punctures given the INR of 3.0 on Apixaban and plt 50s.     Dilan Angela MD     Cardiovascular Division

## 2017-05-30 NOTE — PROGRESS NOTES
EEG CLINICAL NEUROPHYSIOLOGY PRELIMINARY REPORT    Tmax 93, on versed 3 to 4 mg/hr, fentanyl 50ug/hr. Occasional burst suppression. Other periods of more continuous low ampliutde (20uV) theta. EEG not reactive. No epileptiform activity or periodic sharp activity. No electrographic seizures.    Study consistent with severe diffuse encephalopathy. Degree of cerebral dysfunction seen on EEG recording more than would be expected for this level of sedation and hypothermia. No ongoing seizures.    Hi Higginbotham MD  Pager [phone]

## 2017-05-30 NOTE — PROGRESS NOTES
Cross Cover Note    Summary of Overnight Events:  Overnight the patient's lactate continued to trend up. MAPs remained 60-65 on stable dose of epinephrine and vasopressin (unable to wean down until later in the night). Urine output was stable at >35 cc/h. She went into aflutter with rates ~100 bpm for about an hour, with slightly softer BP during that time. She received 2 amps of bicarb for bicarb of 16 with improvement in bicarb and eventual improvement in pressor requirement. Abdomen remained soft. LUE was evaluated earlier in the day due to IV extravasation - it was then noted that she had no dopplerable left radial pulse. Formal ultrasound then showed occluded radial from AC to wrist, with patent ulnar artery. Vascular surgery was consulted and assessed the patient, agreeing that ulnar flow was currently sufficient and recommended serial examination.  Other concerning lab trends included increasing INR to peak of 4.47 and declining Platelets to 50k range (from 80-100k).    Exam:  Patient sedated, cooled, intubated  LUE: significant edema, blistering and sloughing at AC fossa; left hand cool but with equal cap refill compared with right    Labs reviewed.  Imaging reviewed.    A/P  #Lactic acidosis, Metabolic Acidosis: likely accumulating due to poor renal and liver function; urine output suggests she has adequate organ perfusion. CT Abdomen non-contrast did not show any abdominal abnormality, but cannot completely rule-out mesenteric ischemia. Left arm was concerning for source of lactate, however no sign of compartment syndrome and there is patent ulnar flow to the hand.  -continue to support BP, monitor exam  -As of this AM, lactate improving (patient seems to do much better in sinus rhythm)    #Coagulopathy: c/w liver failure; fibrinogen normal; INR trending back down this AM    #Thrombocytopenia: plt decreased from ~100k to 80k to 50k, now stable; HIT considered - no heparin given this admit upon inspection  of MAR   -peripheral smear ordered    #Radial Artery occlusion; given coagulopathy have not started any anticoagulation (link as clot is not confirmed); cardioembolic event is possible given multiple atrial arrhythmias  -vascular following    Will sign out to AM team    Mar Yusuf MD  Cardiology Fellow  [phone]

## 2017-05-30 NOTE — PROGRESS NOTES
[facility]   WO Nurse Inpatient Wound Assessment     Initial Assessment of wound(s) on pt's:   Left arm    Data:     Patient History:      per MD note(s):  56 year old female with a past history of HTN, RA, VSD s/p repair in , recent hospitalization for a fib/a flutter/SVT 5/15 -  who is admitted to CV ICU for therapeutic hypothermia after a cardiac arrest and was incidentally found to have loss of left radial pulse on nursing doppler checks. 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 No DVTs.   WO to assess left arm extravasation site    Moisture Management:  absorbant pad    Current Diet / Nutrition:           Active Diet Order      NPO for Medical/Clinical Reasons Except for: Meds, Ice Chips             Jay Assessment and sub scores:   Jay Score  Av  Min: 10  Max: 23     Labs:         Recent Labs   Lab Test  17   0939  17   0347   02/19/15   1412   ALBUMIN  1.6*  1.7*   < >   --    HGB  9.8*  10.9*   < >   --    RBC  2.79*  3.08*   < >   --    WBC  7.7  9.0   < >   --    PLT  43*  56*   < >   --    INR  4.45*  3.92*   < >   --    A1C   --    --    --   4.6   CRP   --   110.0*   < >   --     < > = values in this interval not displayed.          Wound Assessment :   Left arm  Wound History:  Extravasation wound with large area of epidermal loss          Wound Base: Red dermis ,   moist    Specific Dimensions (length x width x depth, in cm) :   15 x 12 x 0.1 cm    Palpation of the wound bed:  edema    Periwound Skin: intact,      Drainage:  . Amount: small . Color: serous     Odor: none    Pain:  unable to assess , intubated          Intervention:     Patient's chart evaluated.      Wound(s) was assessed    Wound Care: was done:  Visual inspection    Orders  Written    Supplies  Requested Silvadene Cream    Discussed plan of care with Nurse           Assessment:       Large area of dermal loss 2/2 extravastion         Plan:     Nursing to notify the Provider(s) and re-consult the WOC Nurse if wound(s) deteriorate(s).    Plan of care for wound located on left arem: Change dressing daily, clean with sterile water, apply silvadene cream, cover with adaptic and secure with kerlix    WOC Nurse will return: weekly     Face to face time: 15 minutes

## 2017-05-30 NOTE — PLAN OF CARE
Problem: Goal Outcome Summary  Goal: Goal Outcome Summary  Outcome: No Change  Pt arrived to 4 at 1030 from cath lab. Found down by her . CPR was performed at home. Shocked once by EMT. Shocked once in ER, intubated in ER.       Neuro: Pt moves all 4 extremities, not to command. Withdraws from pain. Pupils 2mm, sluggish. Thermoguard in place. Pt reached goal temp of 34 celcius by 1600. Versed gtt 4mg/h, fentanyl gtt 50mcg/h. Demerol gave x 1 for shivering.  Resp: Vent setting titrated throughout shift. CMV, RR: 20, TV: 450, FiO2: 80%, PEEP: 12. Small, pink, thin secretions, minimal oral secretions. Crackles in bases. Lactic acid rising, current: 11.8, MD aware. 2 Amps bicarb given. MD's aware.   Cardiac: Temporary pacer placed in lab. Pt converted to afib at 1615. HR . BP's not tolerating higher heart rates. Vaso added for pressure control. Epi gtt 0.07 mcg/kg/min, vaso 3 u/h. MAP goal >65. Electrolytes replaced per protocol. PIV in left arm infiltrated in cath lab. Drip unknown. IV was removed upon arrival to unit. Nothing was infusing on transfer. Arm began to blister and become red and tight after 3 hours on unit, left arm found to be pulseless at 1430. MD's notified, saw pt at bedside. A UE US was ordered. Photo of arm was documented.   : Hu[place] placed on arrival to unit. UOP: 30-60mL/h. Total of 120mg lasix given without much effect.   GI: 2 loose BM this shift. OG placed, placement confirmed by xray. D50 20mL given for B shortly after arrival on unit. Insulin drip started at 1800 for B. Insulin gtt 2u/h.  Skin: Left arm with infiltration, see photo documentation. Great toe missing on left foot. Small scabbed sore on right heel pre-existing. Skin pale, blotchy, otherwise intact.

## 2017-05-30 NOTE — MR AVS SNAPSHOT
After Visit Summary   5/30/2017    Amelia Michel    MRN: [id number]           Patient Information     Date Of Birth          1960        Visit Information        Provider Department      5/30/2017 7:00 AM [facility] EEG TECH 4 [facility] EEG        Today's Diagnoses     Cardiogenic shock (H)    -  1       Follow-ups after your visit        Your next 10 appointments already scheduled     Jun 01, 2017  2:00 PM CDT   SHORT with Comfort Sabillon MD   [facility] ([facility])    [id number] [address] [id number] [phone]              Future tests that were ordered for you today     Open Standing Orders        Priority Remaining Interval Expires Ordered    Glucose - Diabetes STAT 1/1 CONDITIONAL X 1 STAT  5/29/2017    Notify Physician - Diabetes - IF patient is on TPN or tube feeding which is held or cycled and patient on continuous insulin infusion.  Routine 73300/87700 PRN  5/29/2017    Notify Physician - Diabetes - when blood glucose has stabilized and patient is tolerating PO intake, Call MD for transition to SQ insulin. Routine 91926/24016 PRN  5/29/2017    Glucose monitor nursing POCT Routine 100/100 CONDITIONAL (SPECIFY)  5/29/2017    Glucose monitor nursing POCT Routine 100/100 CONDITIONAL (SPECIFY)  5/29/2017    Notify Provider Routine 82149/13542 PRN  5/29/2017    EKG 12-lead, tracing only STAT 99/100 CONDITIONAL (SPECIFY)  5/29/2017    Oxygen: Nasal cannula Routine 40945/45067 PRN  5/29/2017    EKG 12-lead, tracing only Routine 1/1 CONDITIONAL X 1  5/29/2017    Smoking Cessation Program IP Consult Routine 1/1 CONDITIONAL X 1  5/29/2017    Oxygen: Nasal cannula Routine 12398/20051 CONTINUOUS  5/29/2017    EKG 12-lead, tracing only Routine 6/7 DAILY  5/29/2017    Magnesium Routine 100/100 CONDITIONAL (SPECIFY)  5/29/2017    Phosphorus Routine 100/100 CONDITIONAL (SPECIFY)  5/29/2017    CRP inflammation Routine 99/100 DAILY  5/29/2017    Erythrocyte sedimentation rate  auto Routine 99/100 DAILY  5/29/2017    Hemoglobin plasma Routine 99/100 DAILY  5/29/2017    Heparin 10a Level Routine 99/100 DAILY  5/29/2017    Lactate Dehydrogenase Routine 99/100 DAILY  5/29/2017    Magnesium Routine 99/100 DAILY  5/29/2017    Phosphorus Routine 99/100 DAILY  5/29/2017    Sputum Culture Aerobic Bacterial Routine 99/100 DAILY  5/29/2017    Glucose whole blood Timed 95/100 EVERY 6 HOURS  5/29/2017    Heparin 10a Level Timed 95/100 EVERY 6 HOURS  5/29/2017    INR Timed 95/100 EVERY 6 HOURS  5/29/2017    Partial thromboplastin time Timed 95/100 EVERY 6 HOURS  5/29/2017    Lactic acid whole blood Timed 95/100 EVERY 6 HOURS  5/29/2017    Magnesium Timed 95/100 EVERY 6 HOURS  5/29/2017    Troponin I Timed 95/100 EVERY 6 HOURS  5/29/2017    D dimer quantitative Timed 17/20 EVERY 12 HOURS  5/29/2017    Fibrinogen activity Timed 17/20 EVERY 12 HOURS  5/29/2017    Antithrombin III Routine 99/100 DAILY  5/29/2017    Blood culture Routine 99/100 DAILY  5/29/2017    Calcium ionized whole blood Routine 99/100 DAILY  5/29/2017    XR Chest Port 1 View Routine 5/7 DAILY IMAGING  5/29/2017    US Lower Extremity Arterial Duplex Bilateral Routine 100/100 CONDITIONAL (SPECIFY)  5/29/2017    Blood gas arterial and oxyhgb Timed 350/366 EVERY 2 HOURS  5/29/2017    CBC with platelets Timed 95/100 EVERY 6 HOURS  5/29/2017    Comprehensive metabolic panel Timed 95/100 EVERY 6 HOURS  5/29/2017    Calcium ionized whole blood Timed 95/100 EVERY 6 HOURS  5/29/2017    ISTAT ACT nursing POCT Routine 50/50 PRN  5/29/2017    EKG 12-lead, tracing only Routine 1/1 CONDITIONAL X 1  5/29/2017    Notify Provider Routine 00000/74835 PRN  5/29/2017          Open Future Orders        Priority Expected Expires Ordered    Outpatient Coronary Angiography Adult Order Routine 5/29/2017 8/17/2017 5/29/2017            Who to contact     Please call your clinic at [phone] to:    Ask questions about your health    Make or cancel  appointments    Discuss your medicines    Learn about your test results    Speak to your doctor   If you have compliments or concerns about an experience at your clinic, or if you wish to file a complaint, please contact [facility] Patient Relations at [phone] or email us at [email or web address]         Additional Information About Your Visit        MyChart Information     [employer] gives you secure access to your electronic health record. If you see a primary care provider, you can also send messages to your care team and make appointments. If you have questions, please call your primary care clinic.  If you do not have a primary care provider, please call [phone] and they will assist you.      [employer] is an electronic gateway that provides easy, online access to your medical records. With [employer], you can request a clinic appointment, read your test results, renew a prescription or communicate with your care team.     To access your existing account, please contact your [facility] or call [phone] for assistance.        Care EveryWhere ID     This is your Care EveryWhere ID. This could be used by other organizations to access your [place] medical records  [id number]         Blood Pressure from Last 3 Encounters:   05/30/17 112/54   05/23/17 125/53   04/04/17 104/78    Weight from Last 3 Encounters:   05/30/17 69.5 kg (153 lb 3.5 oz)   05/23/17 64.4 kg (141 lb 15.6 oz)   04/04/17 61.6 kg (135 lb 12.8 oz)              We Performed the Following     Glucose by meter     Glucose by meter     Glucose by meter          Today's Medication Changes      Notice     This visit is during an admission. Changes to the med list made in this visit will be reflected in the After Visit Summary of the admission.             Primary Care Provider Office Phone # Fax #    Comfort Sabillon [phone]       [facility] [address]  [place] [facility] [address]        Thank you!     Thank you for choosing [facility]  for your care. Our goal is always to provide you with excellent care. Hearing back from our patients is one way we can continue to improve our services. Please take a few minutes to complete the written survey that you may receive in the mail after your visit with us. Thank you!             Your Updated Medication List - Protect others around you: Learn how to safely use, store and throw away your medicines at www.disposemymeds.org.      Notice     This visit is during an admission. Changes to the med list made in this visit will be reflected in the After Visit Summary of the admission.

## 2017-05-31 ENCOUNTER — APPOINTMENT (OUTPATIENT)
Dept: GENERAL RADIOLOGY | Facility: CLINIC | Age: 57
DRG: 260 | End: 2017-05-31
Attending: INTERNAL MEDICINE
Payer: COMMERCIAL

## 2017-05-31 ENCOUNTER — APPOINTMENT (OUTPATIENT)
Dept: GENERAL RADIOLOGY | Facility: CLINIC | Age: 57
DRG: 260 | End: 2017-05-31
Attending: NURSE PRACTITIONER
Payer: COMMERCIAL

## 2017-05-31 ENCOUNTER — ALLIED HEALTH/NURSE VISIT (OUTPATIENT)
Dept: NEUROLOGY | Facility: CLINIC | Age: 57
DRG: 260 | End: 2017-05-31
Attending: PSYCHIATRY & NEUROLOGY
Payer: COMMERCIAL

## 2017-05-31 DIAGNOSIS — R57.0 CARDIOGENIC SHOCK (H): Primary | ICD-10-CM

## 2017-05-31 LAB
ABO + RH BLD: NORMAL
ABO + RH BLD: NORMAL
ALBUMIN SERPL-MCNC: 1.7 G/DL (ref 3.4–5)
ALBUMIN SERPL-MCNC: 1.9 G/DL (ref 3.4–5)
ALP SERPL-CCNC: 129 U/L (ref 40–150)
ALP SERPL-CCNC: 157 U/L (ref 40–150)
ALT SERPL W P-5'-P-CCNC: 939 U/L (ref 0–50)
ALT SERPL W P-5'-P-CCNC: 976 U/L (ref 0–50)
ANION GAP SERPL CALCULATED.3IONS-SCNC: 10 MMOL/L (ref 3–14)
ANION GAP SERPL CALCULATED.3IONS-SCNC: 4 MMOL/L (ref 3–14)
ANION GAP SERPL CALCULATED.3IONS-SCNC: 7 MMOL/L (ref 3–14)
ANION GAP SERPL CALCULATED.3IONS-SCNC: 8 MMOL/L (ref 3–14)
AST SERPL W P-5'-P-CCNC: 765 U/L (ref 0–45)
AST SERPL W P-5'-P-CCNC: 794 U/L (ref 0–45)
AT III ACT/NOR PPP CHRO: 49 % (ref 85–135)
BACTERIA SPEC CULT: NORMAL
BACTERIA SPEC CULT: NORMAL
BASE EXCESS BLDA CALC-SCNC: 6.2 MMOL/L
BASE EXCESS BLDA CALC-SCNC: 6.5 MMOL/L
BASE EXCESS BLDA CALC-SCNC: 7.1 MMOL/L
BASE EXCESS BLDA CALC-SCNC: 7.1 MMOL/L
BASE EXCESS BLDA CALC-SCNC: 7.4 MMOL/L
BASE EXCESS BLDA CALC-SCNC: 7.4 MMOL/L
BASE EXCESS BLDA CALC-SCNC: 7.5 MMOL/L
BASE EXCESS BLDA CALC-SCNC: 7.7 MMOL/L
BASE EXCESS BLDA CALC-SCNC: 7.8 MMOL/L
BASE EXCESS BLDA CALC-SCNC: 8.1 MMOL/L
BASE EXCESS BLDA CALC-SCNC: 8.2 MMOL/L
BASE EXCESS BLDV CALC-SCNC: 5.7 MMOL/L
BASE EXCESS BLDV CALC-SCNC: 7.8 MMOL/L
BILIRUB SERPL-MCNC: 2.1 MG/DL (ref 0.2–1.3)
BILIRUB SERPL-MCNC: 2.4 MG/DL (ref 0.2–1.3)
BLD GP AB SCN SERPL QL: NORMAL
BLD PROD TYP BPU: NORMAL
BLD UNIT ID BPU: 0
BLOOD BANK CMNT PATIENT-IMP: NORMAL
BLOOD PRODUCT CODE: NORMAL
BPU ID: NORMAL
BUN SERPL-MCNC: 44 MG/DL (ref 7–30)
BUN SERPL-MCNC: 46 MG/DL (ref 7–30)
BUN SERPL-MCNC: 51 MG/DL (ref 7–30)
BUN SERPL-MCNC: 55 MG/DL (ref 7–30)
CA-I BLD-MCNC: 4.1 MG/DL (ref 4.4–5.2)
CA-I BLD-MCNC: 4.2 MG/DL (ref 4.4–5.2)
CA-I BLD-MCNC: 4.4 MG/DL (ref 4.4–5.2)
CA-I BLD-MCNC: 4.5 MG/DL (ref 4.4–5.2)
CALCIUM SERPL-MCNC: 7.4 MG/DL (ref 8.5–10.1)
CALCIUM SERPL-MCNC: 7.6 MG/DL (ref 8.5–10.1)
CALCIUM SERPL-MCNC: 7.9 MG/DL (ref 8.5–10.1)
CALCIUM SERPL-MCNC: 7.9 MG/DL (ref 8.5–10.1)
CHLORIDE SERPL-SCNC: 107 MMOL/L (ref 94–109)
CHLORIDE SERPL-SCNC: 108 MMOL/L (ref 94–109)
CHLORIDE SERPL-SCNC: 109 MMOL/L (ref 94–109)
CHLORIDE SERPL-SCNC: 110 MMOL/L (ref 94–109)
CO2 SERPL-SCNC: 31 MMOL/L (ref 20–32)
CO2 SERPL-SCNC: 31 MMOL/L (ref 20–32)
CO2 SERPL-SCNC: 32 MMOL/L (ref 20–32)
CO2 SERPL-SCNC: 34 MMOL/L (ref 20–32)
COPATH REPORT: NORMAL
CREAT SERPL-MCNC: 1.82 MG/DL (ref 0.52–1.04)
CREAT SERPL-MCNC: 1.85 MG/DL (ref 0.52–1.04)
CREAT SERPL-MCNC: 1.88 MG/DL (ref 0.52–1.04)
CREAT SERPL-MCNC: 1.89 MG/DL (ref 0.52–1.04)
CRP SERPL-MCNC: 170 MG/L (ref 0–8)
D DIMER PPP FEU-MCNC: 6 UG/ML FEU (ref 0–0.5)
D DIMER PPP FEU-MCNC: 6.7 UG/ML FEU (ref 0–0.5)
ERYTHROCYTE [DISTWIDTH] IN BLOOD BY AUTOMATED COUNT: 18.9 % (ref 10–15)
ERYTHROCYTE [DISTWIDTH] IN BLOOD BY AUTOMATED COUNT: 22 % (ref 10–15)
FIBRINOGEN PPP-MCNC: 357 MG/DL (ref 200–420)
FIBRINOGEN PPP-MCNC: 457 MG/DL (ref 200–420)
GFR SERPL CREATININE-BSD FRML MDRD: 27 ML/MIN/1.7M2
GFR SERPL CREATININE-BSD FRML MDRD: 28 ML/MIN/1.7M2
GFR SERPL CREATININE-BSD FRML MDRD: 28 ML/MIN/1.7M2
GFR SERPL CREATININE-BSD FRML MDRD: 29 ML/MIN/1.7M2
GLUCOSE BLD-MCNC: 123 MG/DL (ref 70–99)
GLUCOSE BLD-MCNC: 134 MG/DL (ref 70–99)
GLUCOSE BLD-MCNC: 138 MG/DL (ref 70–99)
GLUCOSE BLDC GLUCOMTR-MCNC: 110 MG/DL (ref 70–99)
GLUCOSE BLDC GLUCOMTR-MCNC: 123 MG/DL (ref 70–99)
GLUCOSE BLDC GLUCOMTR-MCNC: 128 MG/DL (ref 70–99)
GLUCOSE BLDC GLUCOMTR-MCNC: 129 MG/DL (ref 70–99)
GLUCOSE BLDC GLUCOMTR-MCNC: 130 MG/DL (ref 70–99)
GLUCOSE BLDC GLUCOMTR-MCNC: 132 MG/DL (ref 70–99)
GLUCOSE BLDC GLUCOMTR-MCNC: 134 MG/DL (ref 70–99)
GLUCOSE BLDC GLUCOMTR-MCNC: 135 MG/DL (ref 70–99)
GLUCOSE BLDC GLUCOMTR-MCNC: 136 MG/DL (ref 70–99)
GLUCOSE BLDC GLUCOMTR-MCNC: 136 MG/DL (ref 70–99)
GLUCOSE BLDC GLUCOMTR-MCNC: 139 MG/DL (ref 70–99)
GLUCOSE BLDC GLUCOMTR-MCNC: 140 MG/DL (ref 70–99)
GLUCOSE BLDC GLUCOMTR-MCNC: 147 MG/DL (ref 70–99)
GLUCOSE SERPL-MCNC: 125 MG/DL (ref 70–99)
GLUCOSE SERPL-MCNC: 138 MG/DL (ref 70–99)
GLUCOSE SERPL-MCNC: 138 MG/DL (ref 70–99)
GLUCOSE SERPL-MCNC: 140 MG/DL (ref 70–99)
GRAM STN SPEC: NORMAL
HBA1C MFR BLD: NORMAL % (ref 4.3–6)
HCO3 BLD-SCNC: 31 MMOL/L (ref 21–28)
HCO3 BLD-SCNC: 32 MMOL/L (ref 21–28)
HCO3 BLDV-SCNC: 30 MMOL/L (ref 21–28)
HCO3 BLDV-SCNC: 32 MMOL/L (ref 21–28)
HCT VFR BLD AUTO: 21.8 % (ref 35–47)
HCT VFR BLD AUTO: 23.6 % (ref 35–47)
HCT VFR BLD AUTO: 24.1 % (ref 35–47)
HCT VFR BLD AUTO: 28.4 % (ref 35–47)
HGB BLD-MCNC: 7.1 G/DL (ref 11.7–15.7)
HGB BLD-MCNC: 7.4 G/DL (ref 11.7–15.7)
HGB BLD-MCNC: 7.5 G/DL (ref 11.7–15.7)
HGB BLD-MCNC: 7.6 G/DL (ref 11.7–15.7)
HGB BLD-MCNC: 8.3 G/DL (ref 11.7–15.7)
HGB BLD-MCNC: 9.7 G/DL (ref 11.7–15.7)
HGB FREE PLAS-MCNC: 30 MG/DL
INR PPP: 2.1 (ref 0.86–1.14)
INR PPP: 2.95 (ref 0.86–1.14)
INR PPP: 3.38 (ref 0.86–1.14)
INR PPP: 3.68 (ref 0.86–1.14)
INTERPRETATION ECG - MUSE: NORMAL
LACTATE BLD-SCNC: 1.8 MMOL/L (ref 0.7–2.1)
LACTATE BLD-SCNC: 2 MMOL/L (ref 0.7–2.1)
LACTATE BLD-SCNC: 2.5 MMOL/L (ref 0.7–2.1)
LACTATE BLD-SCNC: 4 MMOL/L (ref 0.7–2.1)
LDH SERPL L TO P-CCNC: 801 U/L (ref 81–234)
LMWH PPP CHRO-ACNC: ABNORMAL IU/ML
MAGNESIUM SERPL-MCNC: 2.2 MG/DL (ref 1.6–2.3)
MAGNESIUM SERPL-MCNC: 2.3 MG/DL (ref 1.6–2.3)
MAGNESIUM SERPL-MCNC: 2.3 MG/DL (ref 1.6–2.3)
MCH RBC QN AUTO: 32.8 PG (ref 26.5–33)
MCH RBC QN AUTO: 34.5 PG (ref 26.5–33)
MCH RBC QN AUTO: 35.4 PG (ref 26.5–33)
MCH RBC QN AUTO: 35.6 PG (ref 26.5–33)
MCHC RBC AUTO-ENTMCNC: 32.2 G/DL (ref 31.5–36.5)
MCHC RBC AUTO-ENTMCNC: 32.6 G/DL (ref 31.5–36.5)
MCHC RBC AUTO-ENTMCNC: 34.2 G/DL (ref 31.5–36.5)
MCHC RBC AUTO-ENTMCNC: 34.4 G/DL (ref 31.5–36.5)
MCV RBC AUTO: 102 FL (ref 78–100)
MCV RBC AUTO: 103 FL (ref 78–100)
MCV RBC AUTO: 104 FL (ref 78–100)
MCV RBC AUTO: 106 FL (ref 78–100)
MICRO REPORT STATUS: NORMAL
NUM BPU REQUESTED: 1
O2/TOTAL GAS SETTING VFR VENT: 50 %
O2/TOTAL GAS SETTING VFR VENT: 50 %
O2/TOTAL GAS SETTING VFR VENT: 55 %
O2/TOTAL GAS SETTING VFR VENT: 60 %
O2/TOTAL GAS SETTING VFR VENT: 70 %
OXYHGB MFR BLD: 93 % (ref 92–100)
OXYHGB MFR BLD: 94 % (ref 92–100)
OXYHGB MFR BLD: 95 % (ref 92–100)
OXYHGB MFR BLD: 96 % (ref 92–100)
OXYHGB MFR BLD: 97 % (ref 92–100)
OXYHGB MFR BLD: 97 % (ref 92–100)
OXYHGB MFR BLDV: 72 %
OXYHGB MFR BLDV: 77 %
PCO2 BLD: 39 MM HG (ref 35–45)
PCO2 BLD: 39 MM HG (ref 35–45)
PCO2 BLD: 40 MM HG (ref 35–45)
PCO2 BLD: 40 MM HG (ref 35–45)
PCO2 BLD: 41 MM HG (ref 35–45)
PCO2 BLD: 42 MM HG (ref 35–45)
PCO2 BLD: 42 MM HG (ref 35–45)
PCO2 BLD: 43 MM HG (ref 35–45)
PCO2 BLD: 44 MM HG (ref 35–45)
PCO2 BLDV: 41 MM HG (ref 40–50)
PCO2 BLDV: 45 MM HG (ref 40–50)
PH BLD: 7.45 PH (ref 7.35–7.45)
PH BLD: 7.47 PH (ref 7.35–7.45)
PH BLD: 7.48 PH (ref 7.35–7.45)
PH BLD: 7.48 PH (ref 7.35–7.45)
PH BLD: 7.49 PH (ref 7.35–7.45)
PH BLD: 7.5 PH (ref 7.35–7.45)
PH BLD: 7.51 PH (ref 7.35–7.45)
PH BLDV: 7.46 PH (ref 7.32–7.43)
PH BLDV: 7.47 PH (ref 7.32–7.43)
PHOSPHATE SERPL-MCNC: 6.5 MG/DL (ref 2.5–4.5)
PLATELET # BLD AUTO: 47 10E9/L (ref 150–450)
PLATELET # BLD AUTO: 52 10E9/L (ref 150–450)
PLATELET # BLD AUTO: 54 10E9/L (ref 150–450)
PLATELET # BLD AUTO: 59 10E9/L (ref 150–450)
PO2 BLD: 102 MM HG (ref 80–105)
PO2 BLD: 109 MM HG (ref 80–105)
PO2 BLD: 78 MM HG (ref 80–105)
PO2 BLD: 79 MM HG (ref 80–105)
PO2 BLD: 79 MM HG (ref 80–105)
PO2 BLD: 83 MM HG (ref 80–105)
PO2 BLD: 88 MM HG (ref 80–105)
PO2 BLD: 89 MM HG (ref 80–105)
PO2 BLD: 91 MM HG (ref 80–105)
PO2 BLD: 98 MM HG (ref 80–105)
PO2 BLD: 98 MM HG (ref 80–105)
PO2 BLDV: 42 MM HG (ref 25–47)
PO2 BLDV: 47 MM HG (ref 25–47)
POTASSIUM SERPL-SCNC: 4 MMOL/L (ref 3.4–5.3)
POTASSIUM SERPL-SCNC: 4.3 MMOL/L (ref 3.4–5.3)
POTASSIUM SERPL-SCNC: 4.3 MMOL/L (ref 3.4–5.3)
POTASSIUM SERPL-SCNC: 4.4 MMOL/L (ref 3.4–5.3)
PROT SERPL-MCNC: 4.1 G/DL (ref 6.8–8.8)
PROT SERPL-MCNC: 4.4 G/DL (ref 6.8–8.8)
RBC # BLD AUTO: 2.06 10E12/L (ref 3.8–5.2)
RBC # BLD AUTO: 2.32 10E12/L (ref 3.8–5.2)
RBC # BLD AUTO: 2.33 10E12/L (ref 3.8–5.2)
RBC # BLD AUTO: 2.74 10E12/L (ref 3.8–5.2)
SODIUM BLD-SCNC: 145 MMOL/L (ref 133–144)
SODIUM SERPL-SCNC: 147 MMOL/L (ref 133–144)
SODIUM SERPL-SCNC: 147 MMOL/L (ref 133–144)
SODIUM SERPL-SCNC: 148 MMOL/L (ref 133–144)
SODIUM SERPL-SCNC: 148 MMOL/L (ref 133–144)
SPECIMEN EXP DATE BLD: NORMAL
SPECIMEN SOURCE: NORMAL
TRANSFUSION STATUS PATIENT QL: NORMAL
WBC # BLD AUTO: 10.4 10E9/L (ref 4–11)
WBC # BLD AUTO: 4.9 10E9/L (ref 4–11)
WBC # BLD AUTO: 5.3 10E9/L (ref 4–11)
WBC # BLD AUTO: 7.5 10E9/L (ref 4–11)

## 2017-05-31 PROCEDURE — 81405 MOPATH PROCEDURE LEVEL 6: CPT | Performed by: INTERNAL MEDICINE

## 2017-05-31 PROCEDURE — 80048 BASIC METABOLIC PNL TOTAL CA: CPT | Performed by: INTERNAL MEDICINE

## 2017-05-31 PROCEDURE — 25000128 H RX IP 250 OP 636: Performed by: NURSE PRACTITIONER

## 2017-05-31 PROCEDURE — 40000344 ZZHCL STATISTIC THAWING COMPONENT: Performed by: NURSE PRACTITIONER

## 2017-05-31 PROCEDURE — 83036 HEMOGLOBIN GLYCOSYLATED A1C: CPT | Performed by: INTERNAL MEDICINE

## 2017-05-31 PROCEDURE — P9037 PLATE PHERES LEUKOREDU IRRAD: HCPCS | Performed by: NURSE PRACTITIONER

## 2017-05-31 PROCEDURE — 80299 QUANTITATIVE ASSAY DRUG: CPT | Performed by: INTERNAL MEDICINE

## 2017-05-31 PROCEDURE — 00000146 ZZHCL STATISTIC GLUCOSE BY METER IP

## 2017-05-31 PROCEDURE — P9016 RBC LEUKOCYTES REDUCED: HCPCS | Performed by: INTERNAL MEDICINE

## 2017-05-31 PROCEDURE — 86316 IMMUNOASSAY TUMOR OTHER: CPT | Performed by: INTERNAL MEDICINE

## 2017-05-31 PROCEDURE — 44500 INTRO GASTROINTESTINAL TUBE: CPT | Performed by: DIETITIAN, REGISTERED

## 2017-05-31 PROCEDURE — 85379 FIBRIN DEGRADATION QUANT: CPT | Performed by: INTERNAL MEDICINE

## 2017-05-31 PROCEDURE — 82533 TOTAL CORTISOL: CPT | Performed by: INTERNAL MEDICINE

## 2017-05-31 PROCEDURE — 40000982 ZZH STATISTICAL HAIKU-CANTO PHOTO

## 2017-05-31 PROCEDURE — 84295 ASSAY OF SERUM SODIUM: CPT | Performed by: INTERNAL MEDICINE

## 2017-05-31 PROCEDURE — P9059 PLASMA, FRZ BETWEEN 8-24HOUR: HCPCS | Performed by: NURSE PRACTITIONER

## 2017-05-31 PROCEDURE — 93010 ELECTROCARDIOGRAM REPORT: CPT | Performed by: INTERNAL MEDICINE

## 2017-05-31 PROCEDURE — 80053 COMPREHEN METABOLIC PANEL: CPT | Performed by: INTERNAL MEDICINE

## 2017-05-31 PROCEDURE — 25000132 ZZH RX MED GY IP 250 OP 250 PS 637: Performed by: NURSE PRACTITIONER

## 2017-05-31 PROCEDURE — 85300 ANTITHROMBIN III ACTIVITY: CPT | Performed by: INTERNAL MEDICINE

## 2017-05-31 PROCEDURE — 25000128 H RX IP 250 OP 636: Performed by: INTERNAL MEDICINE

## 2017-05-31 PROCEDURE — 83051 HEMOGLOBIN PLASMA: CPT | Performed by: INTERNAL MEDICINE

## 2017-05-31 PROCEDURE — 83735 ASSAY OF MAGNESIUM: CPT | Performed by: INTERNAL MEDICINE

## 2017-05-31 PROCEDURE — 40000196 ZZH STATISTIC RAPCV CVP MONITORING

## 2017-05-31 PROCEDURE — 40000275 ZZH STATISTIC RCP TIME EA 10 MIN

## 2017-05-31 PROCEDURE — 87070 CULTURE OTHR SPECIMN AEROBIC: CPT | Performed by: INTERNAL MEDICINE

## 2017-05-31 PROCEDURE — 93005 ELECTROCARDIOGRAM TRACING: CPT

## 2017-05-31 PROCEDURE — 94003 VENT MGMT INPAT SUBQ DAY: CPT

## 2017-05-31 PROCEDURE — 20000004 ZZH R&B ICU UMMC

## 2017-05-31 PROCEDURE — 85610 PROTHROMBIN TIME: CPT | Performed by: INTERNAL MEDICINE

## 2017-05-31 PROCEDURE — 82805 BLOOD GASES W/O2 SATURATION: CPT | Performed by: INTERNAL MEDICINE

## 2017-05-31 PROCEDURE — 25000125 ZZHC RX 250: Performed by: NURSE PRACTITIONER

## 2017-05-31 PROCEDURE — 87040 BLOOD CULTURE FOR BACTERIA: CPT | Performed by: INTERNAL MEDICINE

## 2017-05-31 PROCEDURE — 71010 XR CHEST PORT 1 VW: CPT

## 2017-05-31 PROCEDURE — 83605 ASSAY OF LACTIC ACID: CPT | Performed by: INTERNAL MEDICINE

## 2017-05-31 PROCEDURE — 36415 COLL VENOUS BLD VENIPUNCTURE: CPT | Performed by: INTERNAL MEDICINE

## 2017-05-31 PROCEDURE — 25000125 ZZHC RX 250: Performed by: INTERNAL MEDICINE

## 2017-05-31 PROCEDURE — 93010 ELECTROCARDIOGRAM REPORT: CPT | Mod: 77 | Performed by: INTERNAL MEDICINE

## 2017-05-31 PROCEDURE — 95951 ZZHC EEG VIDEO EACH 24 HR: CPT | Mod: ZF

## 2017-05-31 PROCEDURE — 99233 SBSQ HOSP IP/OBS HIGH 50: CPT | Mod: GC | Performed by: INTERNAL MEDICINE

## 2017-05-31 PROCEDURE — 25000132 ZZH RX MED GY IP 250 OP 250 PS 637: Performed by: INTERNAL MEDICINE

## 2017-05-31 PROCEDURE — 27210437 ZZH NUTRITION PRODUCT SEMIELEM INTERMED LITER

## 2017-05-31 PROCEDURE — 81402 MOPATH PROCEDURE LEVEL 3: CPT | Performed by: INTERNAL MEDICINE

## 2017-05-31 PROCEDURE — 85027 COMPLETE CBC AUTOMATED: CPT | Performed by: INTERNAL MEDICINE

## 2017-05-31 PROCEDURE — 40000986 XR ABDOMEN PORT F1 VW

## 2017-05-31 PROCEDURE — 86140 C-REACTIVE PROTEIN: CPT | Performed by: INTERNAL MEDICINE

## 2017-05-31 PROCEDURE — 84999 UNLISTED CHEMISTRY PROCEDURE: CPT | Performed by: INTERNAL MEDICINE

## 2017-05-31 PROCEDURE — 83615 LACTATE (LD) (LDH) ENZYME: CPT | Performed by: INTERNAL MEDICINE

## 2017-05-31 PROCEDURE — P9041 ALBUMIN (HUMAN),5%, 50ML: HCPCS | Performed by: INTERNAL MEDICINE

## 2017-05-31 PROCEDURE — 82330 ASSAY OF CALCIUM: CPT | Performed by: INTERNAL MEDICINE

## 2017-05-31 PROCEDURE — 82947 ASSAY GLUCOSE BLOOD QUANT: CPT | Performed by: INTERNAL MEDICINE

## 2017-05-31 PROCEDURE — 83498 ASY HYDROXYPROGESTERONE 17-D: CPT | Performed by: INTERNAL MEDICINE

## 2017-05-31 PROCEDURE — 85018 HEMOGLOBIN: CPT | Performed by: INTERNAL MEDICINE

## 2017-05-31 PROCEDURE — 84100 ASSAY OF PHOSPHORUS: CPT | Performed by: INTERNAL MEDICINE

## 2017-05-31 PROCEDURE — 85384 FIBRINOGEN ACTIVITY: CPT | Performed by: INTERNAL MEDICINE

## 2017-05-31 PROCEDURE — 85520 HEPARIN ASSAY: CPT | Performed by: INTERNAL MEDICINE

## 2017-05-31 PROCEDURE — 87205 SMEAR GRAM STAIN: CPT | Performed by: INTERNAL MEDICINE

## 2017-05-31 PROCEDURE — 82157 ASSAY OF ANDROSTENEDIONE: CPT | Performed by: INTERNAL MEDICINE

## 2017-05-31 RX ORDER — PHYTONADIONE 5 MG/1
5 TABLET ORAL DAILY
Status: COMPLETED | OUTPATIENT
Start: 2017-05-31 | End: 2017-06-02

## 2017-05-31 RX ORDER — ALBUMIN, HUMAN INJ 5% 5 %
12.5 SOLUTION INTRAVENOUS ONCE
Status: COMPLETED | OUTPATIENT
Start: 2017-05-31 | End: 2017-05-31

## 2017-05-31 RX ADMIN — SENNOSIDES AND DOCUSATE SODIUM 1 TABLET: 8.6; 5 TABLET ORAL at 08:19

## 2017-05-31 RX ADMIN — GABAPENTIN 200 MG: 250 SOLUTION ORAL at 14:03

## 2017-05-31 RX ADMIN — ALBUMIN (HUMAN) 12.5 G: 12.5 SOLUTION INTRAVENOUS at 05:28

## 2017-05-31 RX ADMIN — VANCOMYCIN HYDROCHLORIDE 1250 MG: 10 INJECTION, POWDER, LYOPHILIZED, FOR SOLUTION INTRAVENOUS at 12:16

## 2017-05-31 RX ADMIN — SILVER SULFADIAZINE: 10 CREAM TOPICAL at 04:39

## 2017-05-31 RX ADMIN — MULTIVIT AND MINERALS-FERROUS GLUCONATE 9 MG IRON/15 ML ORAL LIQUID 15 ML: at 18:04

## 2017-05-31 RX ADMIN — MINERAL OIL AND WHITE PETROLATUM: 150; 830 OINTMENT OPHTHALMIC at 23:29

## 2017-05-31 RX ADMIN — LIDOCAINE HYDROCHLORIDE 5 ML: 20 SOLUTION ORAL; TOPICAL at 13:00

## 2017-05-31 RX ADMIN — MEROPENEM 1 G: 1 INJECTION, POWDER, FOR SOLUTION INTRAVENOUS at 08:14

## 2017-05-31 RX ADMIN — PANTOPRAZOLE SODIUM 40 MG: 40 INJECTION, POWDER, FOR SOLUTION INTRAVENOUS at 08:14

## 2017-05-31 RX ADMIN — SENNOSIDES AND DOCUSATE SODIUM 1 TABLET: 8.6; 5 TABLET ORAL at 20:12

## 2017-05-31 RX ADMIN — MINERAL OIL AND WHITE PETROLATUM: 150; 830 OINTMENT OPHTHALMIC at 05:14

## 2017-05-31 RX ADMIN — FOLIC ACID 1 MG: 1 TABLET ORAL at 08:19

## 2017-05-31 RX ADMIN — HYDROCORTISONE SODIUM SUCCINATE 100 MG: 100 INJECTION, POWDER, FOR SOLUTION INTRAMUSCULAR; INTRAVENOUS at 04:14

## 2017-05-31 RX ADMIN — SODIUM CHLORIDE 500 ML: 9 INJECTION, SOLUTION INTRAVENOUS at 13:59

## 2017-05-31 RX ADMIN — SODIUM CHLORIDE 500 ML: 9 INJECTION, SOLUTION INTRAVENOUS at 09:21

## 2017-05-31 RX ADMIN — CALCIUM CHLORIDE 1 G: 100 INJECTION INTRAVENOUS; INTRAVENTRICULAR at 11:40

## 2017-05-31 RX ADMIN — HYDROCORTISONE SODIUM SUCCINATE 100 MG: 100 INJECTION, POWDER, FOR SOLUTION INTRAMUSCULAR; INTRAVENOUS at 20:12

## 2017-05-31 RX ADMIN — GABAPENTIN 200 MG: 250 SOLUTION ORAL at 08:17

## 2017-05-31 RX ADMIN — VASOPRESSIN 1.5 UNITS/HR: 20 INJECTION, SOLUTION INTRAMUSCULAR; SUBCUTANEOUS at 22:25

## 2017-05-31 RX ADMIN — PHYTONADIONE 5 MG: 5 TABLET ORAL at 18:05

## 2017-05-31 RX ADMIN — HYDROCORTISONE SODIUM SUCCINATE 100 MG: 100 INJECTION, POWDER, FOR SOLUTION INTRAMUSCULAR; INTRAVENOUS at 12:16

## 2017-05-31 RX ADMIN — VASOPRESSIN 3 UNITS/HR: 20 INJECTION, SOLUTION INTRAMUSCULAR; SUBCUTANEOUS at 02:57

## 2017-05-31 RX ADMIN — GABAPENTIN 200 MG: 250 SOLUTION ORAL at 20:13

## 2017-05-31 RX ADMIN — MEROPENEM 1 G: 1 INJECTION, POWDER, FOR SOLUTION INTRAVENOUS at 20:13

## 2017-05-31 RX ADMIN — MINERAL OIL AND WHITE PETROLATUM: 150; 830 OINTMENT OPHTHALMIC at 14:02

## 2017-05-31 RX ADMIN — OYSTER SHELL CALCIUM WITH VITAMIN D 2 TABLET: 500; 200 TABLET, FILM COATED ORAL at 08:18

## 2017-05-31 NOTE — PROGRESS NOTES
[facility], [place]   Antimicrobial Management Team (AMT) Note              To: CSI  Unit: 4E  Allergies   Allergen Reactions     Penicillins      Tape [Adhesive Tape] Rash     Tolerated ceftriaxone 5/21/17      Brief Summary/HPI:  Amelia Michel is a 56 year old female with a past history of HTN, RA, VSD s/p repair in 1969s, recent hospitalization for a fib/a flutter/SVT 5/15 - 5/23, notable for an ESBL E coli UTI.  The patient was found in respiratory distress and unresponsive overnight and CPR was started by her . ROSC was obtained by EMS. Coronary angiogram on presenation showed normal coronaries. She was admitted to the ICU for therapeutic hypothermia and the need for pressors. On presentation, she had a normal white count (5.2) and was afebrile, but had a lactate of 6.8. She was started on vancomycin/meropenem for empiric treatment of sepsis. Sputum, blood, and urine cultures drawn 5/29 are all negative to date. A chest X-ray from 5/29 showed new mixed interstitial and alveolar opacities, suggestive of severe pulmonary edema; right basilar confluent airspace opacity which may represent superimposed infection. Serial CXR s have continued to show bilateral opacities. Her white count has remained normal and she has been afebrile (rewarmed by 2300 on 5/30).    Assessment:  HCAP  The patient remains on epinephrine in the ICU. She has no overt signs of infection (while fever could have been masked by therapeutic hypothermia) other than a chest X-ray. Blood, sputum, and urine cultures are negative. The patient has no history of MRSA infection. It would be reasonable to discontinue vancomycin.  The findings on chest X-ray could all be due to cardiology etiologies or infection. One option would be to discontinue meropenem 6/1 if the patient remains stable off of vancomycin and there is not a strong suspicion of pneumonia. The patient could then be observed off of therapy. If there is  still strong concern for infection, continue meropenem for a 7 day course. Obtaining a procalcitonin may also help to determine if there is a bacterial pneumonia.     Recommendation/Interventions:   1).  Discontinue vancomycin.  2).  Obtain a procalcitonin  3).  If there is strong concern for infection or if the procalcitonin comes back positive, continue meropenem for a total course of 7 days.    Discussed with ID Staff - Dr. Justino Pool, PharmD  [phone]      Current Anti-infective Orders:  Anti-infectives (Future)    Start     Dose/Rate Route Frequency Ordered Stop    05/30/17 1200  vancomycin (VANCOCIN) 1,250 mg in NaCl 0.9 % 250 mL intermittent infusion      1,250 mg Intravenous EVERY 24 HOURS 05/29/17 2051      05/30/17 0800  meropenem (MERREM) 1 g vial to attach to  mL bag      1 g  over 30 Minutes Intravenous EVERY 12 HOURS 05/29/17 2142            Vitals and other clinical features  Vitals       05/29 0700  -  05/30 0659 05/30 0700  -  05/31 0659 05/31 0700  -  05/31 1329   Most Recent    Temp ( F) (!)89.8 -  100.8    (!)91.6 -  98.8    98.1 -  98.8     98.1 (36.7)    Pulse   (!)43             Heart Rate 43 -  137    57 -  90      90     90    Resp 12 -  27      16      16     16    BP (!)48/33 -  (!) 138/98      112/54    91/48 -  122/50     112/64    SpO2 (%) (!)52 -  100    (!)89 -  99    95 -  100     97        Temperature curve:      Labs  Estimated Creatinine Clearance: 37.7 mL/min (based on Cr of 1.85).  Recent Labs   Lab Test  05/31/17   1018  05/31/17   0357  05/30/17   2204  05/30/17   1545  05/30/17   0939  05/30/17   0347   CR  1.85*  1.89*  1.78*  1.70*  1.67*  1.62*       Recent Labs   Lab Test  05/31/17   1214  05/31/17   1018  05/31/17   0357  05/30/17   2204  05/30/17   1545  05/30/17   0939  05/30/17   0347   05/29/17   1055  05/29/17   0720   05/15/17   1752  04/27/16   1656  03/20/16   1738   WBC   --   7.5  10.4  9.0  7.0  7.7  9.0   < >  10.2  5.2   < >  4.2   2.0*  2.9*   ANEU   --    --    --    --    --   6.2   --    --   8.6*  4.0   --   2.4  1.5*  1.5*   ALYM   --    --    --    --    --   0.7*   --    --   1.0  0.4*   --   1.2  0.3*  0.8   TERRA   --    --    --    --    --   0.7   --    --   0.5  0.5   --   0.6  0.1  0.6   AEOS   --    --    --    --    --   0.0   --    --   0.0  0.0   --   0.0  0.0  0.0   HGB  7.5*  8.3*  9.7*  9.7*  9.9*  9.8*  10.9*   < >  11.5*  9.4*   < >  10.9*  10.5*  11.3*   HCT   --   24.1*  28.4*  27.9*  29.7*  29.1*  32.7*   < >  35.2  29.7*   < >  32.7*  31.2*  33.4*   MCV   --   103*  104*  103*  104*  104*  106*   < >  110*  111*   < >  108*  104*  106*   PLT   --   52*  59*  60*  47*  43*  56*   < >  80*  104*   < >  100*  94*  108*    < > = values in this interval not displayed.       Recent Labs   Lab Test  05/31/17   1018  05/31/17   0357  05/30/17   2204  05/30/17   1545  05/30/17   0939  05/30/17 0347   BILITOTAL  2.4*  2.1*  2.0*  1.8*  1.6*  1.6*   ALKPHOS  129  157*  148  140  125  135   ALBUMIN  1.9*  1.7*  1.6*  1.6*  1.6*  1.7*   AST  765*  794*  725*  729*  776*  862*   ALT  939*  976*  873*  850*  820*  920*       Recent Labs   Lab Test  05/31/17   1011  05/31/17   0357  05/31/17   0347   05/30/17   0347   05/29/17   1055   05/21/17   0843   03/20/16   1738   LACT  2.5*   --   4.0*   < >  13.3*   < >  8.3*   < >   --    < >   --    CRP   --   170.0*   --    --   110.0*   --   58.0*   --   72.0*   --   28.4*   SED   --    --    --    --   34*   --   22   --    --    --   29    < > = values in this interval not displayed.     No lab results found.    Invalid input(s): AMIK    Culture results with specimen source  Culture Micro   Date Value Ref Range Status   05/31/2017 Pending  Incomplete   05/31/2017 No growth after 1 hour  Final   05/30/2017 Light growth Normal hrashal  Final   05/30/2017 No growth after 1 day  Final   05/29/2017 No growth after 2 days  Final   05/29/2017 Moderate growth Normal harshal  Final    Specimen  Description   Date Value Ref Range Status   05/31/2017 Sputum Endotracheal  Final   05/31/2017 Sputum Endotracheal  Final   05/31/2017 Blood Right Arm  Final   05/30/2017 Sputum  Final   05/30/2017 Blood Left Femoral Central line  Final   05/29/2017 Blood RIGHT FEMORAL  Final        Urine Studies     Recent Labs   Lab Test  05/29/17   1305  05/21/17   0800  03/20/16   1702  01/14/15   1137   URINEPH  6.0  5.5  5.5  5.5   NITRITE  Negative  Positive*  Negative  Negative   LEUKEST  Negative  Large*  Negative  Negative   WBCU  47*  >182*  <1  1       CSF Testing  No lab results found.    Respiratory Virus Testing    No results found for: RVSPEC, IFLUA, FLUAH1, FLUAH3, ZP8597, IFLUB, RSVA, RSVB, PIV1, PIV2, PIV3, HMPV, HRVS, ADVBE, ADVC  Last check of C difficile  C Diff Toxin B PCR   Date Value Ref Range Status   05/21/2017  NEG Final    Negative  Negative: Clostridium difficile target DNA sequences NOT detected, presumed   negative for Clostridium difficile toxin B or the number of bacteria present   may be below the limit of detection for the test.   FDA approved assay performed using [employer] GeneXpert real-time PCR.   A negative result does not exclude actual disease due to Clostridium difficile   and may be due to improper collection, handling and storage of the specimen or   the number of organisms in the specimen is below the detection limit of the   assay.   Critical Value/Significant Value called to and read back by  DR. HEIDY FIGUEROA ON 05.21.17 @3:25PM BY JEY.         Imaging:  Us Lower Extremity Arterial Duplex Bilateral    Result Date: 5/30/2017  Exam: Duplex ultrasound of the bilateral lower extremity arteries dated 5/30/2017 9:59 AM Clinical information: Baseline to assess for blood flow to the lower extremities Comparison: None ultrasound available, CT 5/29/2017 Technique: Includes Gray Scale images, color Doppler, spectral Doppler waveforms and velocities with appropriate angles of 60 degrees or  less. Findings: Bilateral inguinal lines. Right lower extremity: Proximal SFA: 69 cm/sec. Waveforms: Monophasic Mid SFA: 75 cm/sec. Waveforms: Monophasic Distal SFA: 89 cm/sec. Waveforms: Monophasic Popliteal artery: 59 cm/sec. Waveforms: Monophasic PTA ankle: 20 cm/sec. Waveforms: Monophasic CANDICE ankle: 44 cm/sec. Waveforms: Monophasic DPA: 10 cm/s. Left lower extremity: Proximal SFA: 84 cm/sec. Waveforms: Monophasic Mid SFA: 66 cm/sec. Waveforms: Monophasic Distal SFA: 64 cm/sec. Waveforms: Monophasic Popliteal artery: 47 cm/sec. Waveforms: Monophasic PTA ankle: 24 cm/sec. Waveforms: Monophasic CANDICE ankle: 17 cm/sec. Waveforms: Monophasic DPA: 24 cm/s.     Impression: Patent arteries in bilateral lower extremities. Guidelines: [facility] duplex criteria for lower limb arterial occlusive disease -Percent stenosis- Normal (1-19%): Peak systolic velocity (cm/s): <150, End-diastolic velocity (cm/s): <40, Velocity ration (Vr): <1.5, Distal arterial waveform: Triphasic -Percent stenosis- 20-49%: Peak systolic velocity (cm/s): 150-200, End-diastolic velocity (cm/s): <40, Velocity ration (Vr): 1.5-2.0, Distal arterial waveform: Triphasic -Percent stenosis- 50-75%: Peak systolic velocity (cm/s): 200-300, End-diastolic velocity (cm/s): <90, Velocity ration (Vr): 2.0-3.9, Distal arterial waveform: Poststenotic turbulence distal to stenosis, monophasic distal waveform -Percent stenosis- >75%: Peak systolic velocity (cm/s): >300, End-diastolic velocity (cm/s): <90, Velocity ration (Vr): >4.0, Distal arterial waveform: Dampened distal waveform and low PSV/EDV* in the stenosis -Percent stenosis- Occlusion: Absent flow by color Doppler/pulsed Doppler spectral analysis; length of occlusion estimated from distance between exit and reentry collateral arteries *PSV = peak systolic velocity, EDV = end-diastolic velocity http://link.hicks.com/chapter/10.1007/[email or web address]/fulltext html I have personally  reviewed the examination and initial interpretation and I agree with the findings. BENJY RUFFIN MD    Us Upper Ext Arterial Duplex Left Port    Result Date: 5/29/2017  Duplex Doppler ultrasound assessment of the left upper extremity arteries bilaterally 5/29/2017 9:48 PM Clinical information: Assess for arterial occlusive disease Ordering provider: Technique: B-mode (grayscale) and duplex Doppler ultrasound of the upper extremity arteries. Velocity measurements obtained with angle correction at or less than 60 degrees. Findings: Peak systolic velocities: Left Upper Extremity Arteries: Subclavian: 89 cm/s Axillary artery: 38 cm/sec Brachial proximal: 92 cm/sec Brachial mid: 53 cm/sec Brachial antecubital: 53 cm/sec Radial artery: Occluded from the antecubital fossa distally Ulnar artery proximal: 76 cm/sec Ulnar artery mid: 26 cm/sec Ulnar artery distal: 19 cm/sec     Impression: Occluded radial artery from the antecubital fossa distally. Other arteries are patent. [Urgent Result: Occluded left radial artery] Finding was identified on 5/29/2017 9:51 PM. Patient's Nurse Monalisa was contacted by Dr. Blair at 5/29/2017 9:55 PM and verbalized understanding of the urgent finding. Physician team was not immediately available to take report. I have personally reviewed the examination and initial interpretation and I agree with the findings. HAIDER BRITO MD    Us Upper Extremity Venous Duplex Left Portable    Result Date: 5/29/2017  EXAMINATION: DOPPLER VENOUS ULTRASOUND OF THE LEFT UPPER EXTREMITY, 5/29/2017 9:48 PM COMPARISON: None. HISTORY: Left arm swelling TECHNIQUE:  Gray-scale evaluation with compression, spectral flow and color Doppler assessment of the deep venous system of the left upper extremity. FINDINGS: Left: Normal blood flow and waveforms are demonstrated in the internal jugular, innominate, subclavian, and axillary veins. There is normal compressibility of the brachial veins. Cephalic and basilic veins  are not identified, likely secondary to extensive swelling.     IMPRESSION: 1.  No evidence of left upper extremity deep venous thrombosis. Cephalic and basilic veins were not identified. I have personally reviewed the examination and initial interpretation and I agree with the findings. HAIDER BRITO MD    Xr Chest Port 1 View    Result Date: 5/31/2017  Exam: Chest x-ray one view, 5/31/2017 1:16 AM Indication: Check endotracheal tube Comparison: 5/30/2017 Findings: Endotracheal tube tip projects 3.8 cm above the ermelinda. Enteric tube courses into the stomach, tip collimated outside the field of view. Temperature probe tip projects over the distal esophagus. Inferior approach central venous catheter tip projects over the right atrium. Stable cardiomediastinal silhouette and pulmonary vasculature. No change in patchy bilateral lung opacities or small pleural effusions. No pneumothorax. New right IJ right atrial lead.     Impression: Stable support devices. Stable patchy bilateral opacities. I have personally reviewed the examination and initial interpretation and I agree with the findings. HECTOR CARSON MD    Xr Chest Port 1 View    Result Date: 5/30/2017  Exam: XR CHEST PORT 1 VW, 5/30/2017 7:09 PM Indication: temporary pacemaker positioning Comparison: 5/30/2017, 5/29/2017, 5/23/2017. Findings: A single portable AP view the chest was obtained. The endotracheal tube tip projects over the mid thoracic trachea. The temperature probe tip projects of the distal esophagus. The enteric tube passes below the field-of-view. Interval repositioning of temporary pacemaker leads, now projecting over the midline right atrium. Mediastinal surgical clips and median sternotomy wires. The cardiomediastinal silhouette is unchanged. No pneumothorax. Trace pleural effusions bilaterally. Grossly unchanged diffuse airspace opacities. The upper abdomen is unremarkable.     Impression: 1. The temporary pacemaker tips project over  the midline right atrium. Otherwise stable support devices. 2. Unchanged diffuse airspace opacities which may represent pulmonary edema, infection, or ARDS. 3. Trace pleural effusions bilaterally. I have personally reviewed the examination and initial interpretation and I agree with the findings. NHAN COLMENARES MD    Xr Chest Port 1 View    Result Date: 5/30/2017  Exam: TEMPORARY, 5/30/2017 1:31 AM Indication: Check endotracheal tube Comparison: 5/29/2017 Findings: Endotracheal tube tip projects 5.9 cm above the ermelinda. OG tube courses into the stomach, tip collimated outside the field of view. Esophageal temperature probe tip projects over the mid/low thoracic esophagus. ACLS cannula appears unchanged, projecting over the right atrium at the atrial caval junction. Slight improvement in bilateral basilar predominant opacities. Decreased small right pleural effusion. No pneumothorax.     Impression: 1. Endotracheal tube tip projects 5.0 cm above the ermelinda. ACLS cannula projects over the superior atrial caval junction. 2. Slight improvement in bilateral basilar predominant opacities, pulmonary edema versus infection and atelectasis. I have personally reviewed the examination and initial interpretation and I agree with the findings. HAIDER BRITO MD    Xr Chest Port 1 View    Result Date: 5/29/2017   Examination:  XR CHEST PORT 1 VW  Date:  5/29/2017 11:15 AM  Clinical Information: Check endotracheal tube placement and ECLS cannula placement. DO NOT log-roll patient.  Place film under patient using 6 MAN LIFT Additional Information: none Comparison: 5/29/2017, 7:47 AM chest x-ray. Findings: The ET tube remains in mid trachea in good position. The bilateral interstitial and alveolar changes of the lungs has shown minimal mixed changes. Is probably increasing right-sided pleural effusion. There is underlying cardiomegaly. NG tube is in place and side-port appears to be at the GE junction, consider advancing. There  is a venous line entering from the inferior direction and the tip is projected at the atrial caval junction.     Impression: 1. ET tube is in good position. 2. Interval increase in interstitial and alveolar changes of the lungs. 3. Probable increasing right-sided pleural effusion.. HAIDER BRITO MD    Chest  Xr, 1 View Portable    Result Date: 5/29/2017  XR CHEST PORT 1 VW  5/29/2017 7:58 AM  HISTORY: intubation COMPARISON: 5/23/2017 FINDINGS: Single portable AP supine view of the chest. ET tube with tip approximately 5 cm above the ermelinda. Unchanged cardiomegaly. Sternotomy wires are again seen. Mixed interstitial and alveolar airspace opacities, increased since prior exam, despite of difference in technique. Confluent opacity in the right lower lung, new since prior exam. Small right effusion and right basilar compressive atelectasis. No pneumothorax.     IMPRESSION: 1. Interval endotracheal intubation. 2. New mixed interstitial and alveolar opacities, suggestive of severe pulmonary edema. 3. Right basilar confluent airspace opacity, may represent superimposed infection. 4. Small right pleural effusion and compressive atelectasis. I have personally reviewed the examination and initial interpretation and I agree with the findings. HAIDER BRITO MD    Xr Abdomen Port 1 View    Result Date: 5/29/2017  XR ABDOMEN PORT F1 VW  5/29/2017 1:53 PM  HISTORY: og placement COMPARISON: CT earlier today FINDINGS: Esophageal temperature probe projecting over the low esophagus. Gastric tube with sidehole projecting over the body of the stomach. ECMO cannula is partially seen. Bowel loops are mostly obscured. Extensive pulmonary opacities.     IMPRESSION: Gastric tube with sidehole projecting over the body of the stomach. I have personally reviewed the examination and initial interpretation and I agree with the findings. HAIDER BRITO MD    Ct Chest Abdomen Pelvis W/o Contrast    Result Date: 5/29/2017  EXAMINATION: CT CHEST  ABDOMEN PELVIS W/O CONTRAST, 5/29/2017 10:25 AM TECHNIQUE:  Helical CT images from the thoracic inlet through the symphysis pubis were obtained  without IV contrast. COMPARISON: CT of the abdomen from 1/28/2015. HISTORY: post arrest FINDINGS: Chest: Endotracheal tube in proper position. Cardiomegaly. No substantial pericardial effusion. ECMO cannula are in place. Small right pleural effusion. No pneumothorax. Interlobular septal thickening. Extensive right lower lobe and left lower lobe alveolar opacities. Additional groundglass opacities throughout the left lung. Confluent patchy consolidation in the lingula. Trachea and central airways are patent. Abdomen and pelvis: Gastric tube with tip in the body of the stomach. ECMO cannula is noted. Spleen is not enlarged. Densities in the renal collecting system, likely from prior intravenous contrast injection. No focal hepatic lesion. Possible peripancreatic fat stranding and fluid collection. Small fluid in the lower pelvis. Bowel loops are not distended. Mild subcutaneous soft tissue edema. Bones and soft tissues: There are multiple bilateral anterior rib fractures involving the third fourth and possible fifth ribs on the right and the third through fifth ribs on the left.. Degenerative changes of the lumbosacral spine.     IMPRESSION: 1. Extensive consolidation in the right lower and left lower lobe, may represent pulmonary edema and/or infection. Small right pleural effusion. 2. Multiple bilateral anterior rib fractures, presumably from resuscitation. 3. Small peripancreatic fluid collection. Evaluation is limited by the absence of intravenous contrast and motion artifact. 4. Small free fluid in the pelvis. 5. Venous cannulas in place entering from the femoral vessels. I have personally reviewed the examination and initial interpretation and I agree with the findings. HAIDER BRITO MD    Ct Head W/o Contrast    Result Date: 5/29/2017  CT HEAD W/O CONTRAST 5/29/2017  10:25 AM Provided History: post arrest Comparison: None. Technique: Using multidetector thin collimation helical acquisition technique, axial, coronal and sagittal CT images from the skull base to the vertex were obtained without intravenous contrast. Findings:  Mild cerebral volume loss. No intracranial hemorrhage, mass effect, or midline shift. The ventricles are proportionate to the cerebral sulci. The gray to white matter differentiation of the cerebral hemispheres is preserved. The basal cisterns are patent. The visualized paranasal sinuses are clear. The mastoid air cells are clear.      Impression: No acute intracranial pathology. I have personally reviewed the examination and initial interpretation and I agree with the findings. MICHAEL POSEY MD

## 2017-05-31 NOTE — OP NOTE
EP PROCEDURE NOTE    Procedures:  1. Single chamber (atrial) temporary lead implant.    Attending: Dr. Eaton  EP Fellow: Dr. Parmar  Procedure Date: 5/30/2017    Pre-operative Diagnosis:  Sick sinus syndrome, chronotropic incompetence.  Post-operative diagnosis:   Successful implantation of right atrial lead.  Complications: None.     Fluoroscopy time/dose: 3.1 minutes, 51 mGy.      Clinical Profile:  57 yo female with out of hispital cardiac arrest who was atrial pacing dependent when in sinus rhythm and recent loss of temporary atrial wire capture.     PROCEDURE  The risks and benefits of the procedure were explained to the patient in full.  The risks of the procedure include, but are not limited to: pain, bleeding, blood transfusion reactions, infection, pneumothorax, perforation of vessels or heart, pericardial effusion, and death. Informed Consent was obtained. The patient was brought to the EP lab in a fasting and hemodynamically stable condition. The patient was prepped and draped in a sterile fashion.   Sedation: This procedure was performed under moderate sedation under the supervision of the the Staff Physician. The patient received 4 mg Versed and 50 mcg Fentanyl for a total procedural sedation time of 39 minutes. Heart rate, blood pressure, oxygen saturation, and patient responses were monitored throughout the procedure with the assistance of the RN.     The right IJ was accessed under ultrasound guidance..  A guidewire was into the level of the IVC. A 7F sheath was advanced over the guidwire and though this a 58 cm St. Silvio tendril was advanced into the RA. We first tried the appendage, but were unable to fix the lead, we then moved to the lateral wall where the lead was fastened. We then performed a cardioversion to evaluate the atrial lead.  A locking sheath was tightened over the lead and it was connected to the temp generator.        Dr. Eaton was present throughout the entire  procedure.    EQUIPMENT  2.The RA Lead is a [employer]. Silvio 58 cm tendril.    MEASUREMENTS  The RA is sensing P waves of 1.8 mV and a pacing capture threshold of 0.8 mA.     FINAL PROGRAMMING  Reyes Settings:  -Pacing mode: AAI 90, sensitivity set to 1mV.    Abhishek Parmar MD  EF Fellow    EP STAFF NOTE  I was present throughout the procedure. I agree with the note above by the EP fellow.  Juan Eaton MD [facility]  Cardiology - Electrophysiology

## 2017-05-31 NOTE — PROGRESS NOTES
CLINICAL NUTRITION SERVICES - ASSESSMENT NOTE     Nutrition Prescription    RECOMMENDATIONS FOR MDs/PROVIDERS TO ORDER:  1. Free water flush adjustments per MD pending hydration status  2. Alert RD of ability to adv TF pending hemodynamic stability    Malnutrition Status:    Non-severe malnutrition in the context of acute illness    Recommendations already ordered by Registered Dietitian (RD):  1. Begin Peptamen 1.5 @ 10 mL/hr with no advancement.     2. Begin 30 mL q4h free water flushes for tube patency.    3. Certavite to ensure adequate micronutrients in case of slow TF adv, EN interruptions, hypermetabolic needs.    Future/Additional Recommendations:  1. Once ok to adv TF pending hemodynamic stability, begin adv Peptamen 1.510 mL q8h or per MD to goal 40 ml/hr (960 ml/day) to provide 1440 kcals (30 kcal/kg), 65 g PRO (1.4 g/kg), 739 ml free H2O, 54 g Fat (70% from MCTs), 180 g CHO and no Fiber daily.    2. If need to change to low K+/phos formula, change to Nepro @ goal 35 ml/hr (840 ml/day) to provide 1512 kcals (32 kcal/kg), 68 g PRO (1.4 g/kg), 613 ml free H2O, 135 g CHO and 11 g Fiber daily    3. Once adv TF, ensure K+, Mg++, phos ordered daily while nutrition support advancing. Do not adv if K+, Mg++ < nrml or phos <2. Replace lytes per protocol.    4. If remains intubated, recommend obtain metabolic cart study to assess approp of energy provisions to avoid over/under feeding.       REASON FOR ASSESSMENT  Amelia Michel is a/an 56 year old female assessed by the dietitian for Provider Order - Registered Dietitian to Assess and Order TF per Medical Nutrition Therapy Protocol    NUTRITION HISTORY  -Pt recently admitted last week and seen by RD on 5/23. Per RD note, pt had a decreased appetite since 5/15 d/t nausea. Has diarrhea chronically. States she follows a low-sodium diet at home. Pt was taking calcium/vitamin D BID, coenzyme Q-10, folic acid, and multivitamin with minerals PTA.   -Pt's   "confirmed during today's visit that pt has not been eating well for a few weeks.     CURRENT NUTRITION ORDERS  Diet: NPO x 3 days    LABS  Labs reviewed  Na 147  K+ 4.4  Phos 6.5 (from 4.5 yesterday)  Lactic acid 4 (trending down)    MEDICATIONS  Medications reviewed  Epi, Vaso   Oscal w/ D (500 mg Calcium and 200 mg D) BID  Folic acid 1 mg  Bowel regimen    ANTHROPOMETRICS  Height: 147.3 cm (4' 10\")  Most Recent Weight: 70.3 kg (154 lb 15.7 oz)    IBW:43 kg  BMI: Overweight BMI 25-29.9 (29.4 kg/m^2 per 63.5 kg admit wt)  Weight History:   Wt Readings from Last 10 Encounters:   05/31/17 70.3 kg (154 lb 15.7 oz)   05/23/17 64.4 kg (141 lb 15.6 oz)   04/04/17 61.6 kg (135 lb 12.8 oz)   07/13/16 60.5 kg (133 lb 6.4 oz)   04/27/16 68.9 kg (152 lb)   02/19/15 68.1 kg (150 lb 1.6 oz)   01/26/15 68 kg (150 lb)   08/31/11 55.3 kg (122 lb)   02/08/11 67.6 kg (149 lb)     Dosing Weight: 48 kg (adjusted from 5/29 admit wt of 63.5 kg)    ASSESSED NUTRITION NEEDS  Estimated Energy Needs: 1200 - 1440 kcals/day (25 - 30 kcals/kg)  Justification: Maintenance  Estimated Protein Needs: 58 - 72+ grams protein/day (1.2 - 1.5+ grams of pro/kg)  Justification: Hypercatabolism with critical illness  Estimated Fluid Needs: 1 mL/kcal  Justification: Maintenance    PHYSICAL FINDINGS  See malnutrition section below.    MALNUTRITION  % Intake: < 75% for > 7 days (non-severe)  % Weight Loss: Difficult to assess w/ fluid status  Subcutaneous Fat Loss: None observed  Muscle Loss: None observed  Fluid Accumulation/Edema: Mild (BLE)  Malnutrition Diagnosis: Non-severe malnutrition in the context of acute illness    NUTRITION DIAGNOSIS  Inadequate protein-energy intake related to poor appetite/nausea PTA and NPO status w/ intubation as evidenced by NPO x 3 days with no nutrition support yet started, reports of decreased intakes since 5/15.       INTERVENTIONS  Implementation  Nutrition Education: Provided education on starting TF to .  "   Collaboration with other providers - Discussed starting trophic TF today w/ NP  Enteral Nutrition, Feeding tube flush, Multivitamin/mineral supplement therapy - Initiate trophic TFs    Goals  Total avg nutritional intake to meet a minimum of 25 kcal/kg and 1.2 g PRO/kg daily (per dosing wt 48 kg).     Monitoring/Evaluation  Progress toward goals will be monitored and evaluated per protocol.    Chelsea Tang RD, LD, [facility]  CVICU Dietitian  Pager: [phone]

## 2017-05-31 NOTE — PROCEDURES
DATE OF STUDY:  05/29/2017      TYPE OF STUDY:  Inpatient video EEG monitoring      EEG#:  -1      DURATION OF STUDY:  10 hours 7 minutes 4 seconds      HISTORY:  Day 1 of video EEG monitoring in patient, Amleia Michel, a 56-year-old who underwent cardiovascular arrest and who is currently undergoing therapeutic hypothermia.  She is currently being treated with gabapentin.  Sedative drips include fentanyl 50 mcg per hour and Versed 3 mg per hour.     TECHNICAL SUMMARY: This continuous video- EEG monitoring procedure was performed with 23 scalp electrodes in 10-20 electrode system placements, and additional scalp, precordial and other surface electrodes used for electrical referencing and artifact detection.  Video monitoring was utilized and periodically reviewed by EEG technologists and the physician for electroclinical correlations.     FINDINGS:  Discontinuous and relatively attenuated EEG.  There were runs of theta and beta 10-15 microvolts in amplitude lasting for up to 3 seconds, alternating periods with periods of significant attenuation lasting anywhere from 1-4 seconds.  Aggressive stimulation was performed utilizing ICU protocol and there was no clear responsiveness.  Following about 1600, record became somewhat more continuous with theta in the range of 20-25 microvolts occupying up to 70% of the record and with attenuations becoming less persistent.  Some irregular delta was seen as well.      OTHER INTERICTAL ABNORMALITIES:  No epileptiform discharges.  No periodic discharges.      ICTAL:  No electrographic seizures.  Video was reviewed, and there was no clinical correlation with bursts of activity.      IMPRESSION:  Abnormal.  Discontinuous suppressed record is seen.  This is consistent with a severe diffuse encephalopathy.  The abnormalities seen can be in part related to patient's hypothermia (T-max 93.4 Fahrenheit) and anesthetic drips employed.  However, the reported findings are more  severe than is usually seen with this degree of hypothermia and anesthetic drip doses, indicating underlying encephalopathy separate from temperature and anesthetic effects.  Epileptiform activity or seizures were not seen.  It should be noted that seizures may emerge as the patient is warmed and the midazolam drip is discontinued.         KRISTOPHER LUU MD             D: 2017 15:20   T: 2017 04:03   MT: rachel      Name:     FIDELIA MIDDLETON   MRN:      [id number]        Account:        [id number]   :      1960           Procedure Date: 2017      Document: [id number]

## 2017-05-31 NOTE — PROCEDURES
Bridle Placement:   Reason for bridle placement: securement of FT  Medicine delivered during procedure: lubricating jelly   Procedure: Successful Unsuccessful  Location of top of clip on FT: @ 88 cm marker   Condition of nose/skin at time of bridle placement: Unremarkable   Face to Face time with patient: <5 minutes.    Chelsea Tang RD, LD, [facility]  CVICU Dietitian  Pager: [phone]

## 2017-05-31 NOTE — MR AVS SNAPSHOT
After Visit Summary   5/31/2017    Amelia Michel    MRN: [id number]           Patient Information     Date Of Birth          1960        Visit Information        Provider Department      5/31/2017 7:00 AM [facility] EEG TECH 4 [facility] EEG        Today's Diagnoses     Cardiogenic shock (H)    -  1       Follow-ups after your visit        Your next 10 appointments already scheduled     Jun 01, 2017  2:00 PM CDT   SHORT with Comfort Sabillon MD   [facility]    [address] [address] [phone]              Who to contact     Please call your clinic at [phone] to:    Ask questions about your health    Make or cancel appointments    Discuss your medicines    Learn about your test results    Speak to your doctor   If you have compliments or concerns about an experience at your clinic, or if you wish to file a complaint, please contact [facility] Patient Relations at [phone] or email us at [email or web address]         Additional Information About Your Visit        MyChart Information     [employer] gives you secure access to your electronic health record. If you see a primary care provider, you can also send messages to your care team and make appointments. If you have questions, please call your primary care clinic.  If you do not have a primary care provider, please call [phone] and they will assist you.      [employer] is an electronic gateway that provides easy, online access to your medical records. With [employer], you can request a clinic appointment, read your test results, renew a prescription or communicate with your care team.     To access your existing account, please contact your [facility] or call [phone] for assistance.        Care EveryWhere ID     This is your Care EveryWhere ID. This could be used by other organizations to access your [facility]  records  [id number]         Blood Pressure from Last 3 Encounters:   05/31/17 112/64   05/23/17 125/53   04/04/17 104/78    Weight from Last 3 Encounters:   05/31/17 70.3 kg (154 lb 15.7 oz)   05/23/17 64.4 kg (141 lb 15.6 oz)   04/04/17 61.6 kg (135 lb 12.8 oz)              We Performed the Following     Glucose by meter     Glucose by meter          Today's Medication Changes      Notice     This visit is during an admission. Changes to the med list made in this visit will be reflected in the After Visit Summary of the admission.             Primary Care Provider Office Phone # Fax #    Comfort Sabillon [phone]       [facility] [address] [address] [address]        Thank you!     Thank you for choosing [facility]  for your care. Our goal is always to provide you with excellent care. Hearing back from our patients is one way we can continue to improve our services. Please take a few minutes to complete the written survey that you may receive in the mail after your visit with us. Thank you!             Your Updated Medication List - Protect others around you: Learn how to safely use, store and throw away your medicines at www.disposemymeds.org.      Notice     This visit is during an admission. Changes to the med list made in this visit will be reflected in the After Visit Summary of the admission.

## 2017-05-31 NOTE — PROCEDURES
EEG#: -2      DATE OF STUDY:  2017      TYPE OF STUDY:  Inpatient video-EEG monitoring (ICU).      DURATION OF RECORDIN hours, 53 minutes, 23 seconds.      HISTORY:  Day 2 of video-EEG monitoring in patient, Amelia Michel, a 56-year-old who is status post cardiac arrest.  She has been treated with therapeutic hypothermia.  Fentanyl 50 mcg per hour and Versed 3-4 mg per hour was utilized throughout.  Temperature increased from around 34 degrees centigrade at the beginning of the study until to 35-36 degrees centigrade as toward the end of the study.    TECHNICAL SUMMARY: This continuous video- EEG monitoring procedure was performed with 23 scalp electrodes in 10-20 electrode system placements, and additional scalp, precordial and other surface electrodes used for electrical referencing and artifact detection.  Video monitoring was utilized and periodically reviewed by EEG technologists and the physician for electroclinical correlations.     FINDINGS:  At study onset, an intermittent suppression burst pattern is seen.  Runs of theta in the range of 30-50 microvolts, lasting up to 2 seconds noted alternating with periods of significant suppression.  The bursts were synchronous and symmetrical.  The EEG became somewhat more continuous at around 1300 or so with more persistent delta and theta.  Desynchronizations continued but were relatively infrequent.  By 1900 or so, a diffuse alpha range activity was seen.  As study continued, a mixture of frequencies was noted including alpha, theta and beta.  There was no well-organized posterior dominant rhythm and no normal sleep-wake patterns, but the record was certainly more continuous.      OTHER INTERICTAL ABNORMALITIES:  There was no periodic activity.        ICTAL:  There were no electrographic seizures.      IMPRESSION:  Abnormal.  There was evidence of severe diffuse encephalopathy at study onset, but this improved to evidence of moderate diffuse  encephalopathy as the study continued.  This improvement was coincident with discontinuation of therapeutic hypothermia.  Epileptiform activity or electrographic seizures were not seen at any point.         KRISTOPHER LUU MD             D: 2017 15:12   T: 2017 18:29   MT:       Name:     FIDELIA MIDDLETON   MRN:      [id number]        Account:        [id number]   :      1960           Procedure Date: 2017      Document: [id number]

## 2017-05-31 NOTE — PLAN OF CARE
Problem: Goal Outcome Summary  Goal: Goal Outcome Summary  Outcome: No Change  Neuro: Pt remains intubated/sedated after witnessed arrest yesterday. Pupils 2-3mm, sluggish. Thermoguard in place, rewarming started at 1600. Increasing temp by 0.5/h. Goal temp should be reached by 2200 tonight. Versed gtt 4mg/h, fentanyl gtt 50mcg/h.  Resp: Vent setting titrated throughout shift. CMV, RR: 16, TV: 320, FiO2: 50%, PEEP: 8. Scant white, thin secretions, minimal oral secretions. Lungs coarse, clear. Lactic acid decreasing.   Cardiac: Temporary pacer in place, 80 bpm, a-pacing. Pacemaker lost capture at 1815, unable to regain by adjusting controls MD called, EKG and chest XR ordered, leads found to be out of place. Pressure not tolerating lower HR, titrated up pressors. Epi gtt 0.03-.06 mcg/kg/min, vaso gtt 3 u/h. CVP: 8-10. Vascular surgery consulting for extravasation of left arm. Vascular access following for documentation, medication unknown. WOC saw pt today, dressing and medication applied as ordered, WOC will continue to follow. Negative radial pulse, R ulnar, L radial, bilateral pedal and tibial pulses dopplerabke. Continuing to hold Amiodarone per EP. Platelets: 43, CSI aware.   : Huynh patent w/ adequate UOP.   GI: Small BM this shift. Insulin gtt 1u/h. OG tube in place, 175mL output.  Skin: Left arm extravasation dressing c/d/i. Great toe missing on left foot. Small pre-existing scabbed sore on right heel. Skin pale, blotchy, otherwise intact.

## 2017-05-31 NOTE — PROGRESS NOTES
VASCULAR SURGERY PROGRESS NOTE    HPI:  Mrs. Michel is a 56- year old female who was admitted to [facility] on 5/29/2017 following an out-of-hospital cardiac arrest. Vascular Surgery was consulted for left radial arterial occlusion (see below).     SUBJECTIVE: Very limited by sedation and mechanical ventilation     OBJECTIVE:  Vital signs:  Temp: 98.4  F (36.9  C) Temp src: Esophageal BP: 122/50  Heart Rate: 90 Resp: 16 SpO2: 97 % O2 Device: Mechanical Ventilator        Intake/Output Summary (Last 24 hours) at 05/30/17 0927  Last data filed at 05/30/17 0800   Gross per 24 hour   Intake          1764.29 ml   Output             1465 ml   Net           299.29 ml       Vitals:    05/29/17 0704 05/30/17 0400 05/31/17 0400   Weight: 63.5 kg (140 lb) 69.5 kg (153 lb 3.5 oz) 70.3 kg (154 lb 15.7 oz)       PHYSICAL EXAM:  NEURO/PSYCH: Sedated due to medical condition.  SKIN: warm and dry  PULMONARY: Oral intubation; mechanical ventilation.  EXTREMITIES:  Left ulnar signal present, radial monophasic; palmar arch presnt. Left hand warmer. Brisk capillary refill.  Left upper extremity with extensive skin sloughing and bullae.     BMP  Recent Labs  Lab 05/31/17 0357 05/31/17 0347 05/30/17 2204 05/30/17  1747 05/30/17  1545 05/30/17  0939 05/30/17  0347   *  --  148*  --  148* 146* 145*   POTASSIUM 4.4  --  4.3 4.2 4.0 3.8 3.3*   CHLORIDE 107  --  107  --  106 104 105   CO2 31  --  31  --  32 28 21   BUN 44*  --  40*  --  36* 34* 31*   CR 1.89*  --  1.78*  --  1.70* 1.67* 1.62*   * 134* 140*  135*  --  142*  136* 149*  150* 148*  152*   MAG 2.3  --  2.1  --  2.1 2.1 2.2   PHOS 6.5*  --   --   --   --   --  4.5     CBC  Recent Labs  Lab 05/31/17 0357 05/30/17 2204 05/30/17  1545 05/30/17  0939   WBC 10.4 9.0 7.0 7.7   HGB 9.7* 9.7* 9.9* 9.8*   PLT 59* 60* 47* 43*     INR/PTT  Recent Labs  Lab 05/31/17  0357 05/30/17 2204 05/30/17  1545 05/30/17  0939 05/30/17  0347 05/29/17  2228   INR 3.68* 4.09* 4.38* 4.45* 3.92*  4.47*   PTT  --   --  42* 44* 40* 38*     ABG  Recent Labs  Lab 05/31/17  0802 05/31/17  0551 05/31/17  0347 05/31/17  0149   PH 7.50* 7.48* 7.47* 7.45   PCO2 41 42 43 44   PO2 79* 91 83 79*   HCO3 32* 32* 31* 31*     LFT  Recent Labs  Lab 05/31/17  0357 05/30/17  2204 05/30/17  1545 05/30/17  0939   * 725* 729* 776*   * 873* 850* 820*   ALKPHOS 157* 148 140 125   BILITOTAL 2.1* 2.0* 1.8* 1.6*   ALBUMIN 1.7* 1.6* 1.6* 1.6*     Pancreas  Recent Labs  Lab 05/30/17  0939   LIPASE 88         5/29/2017 ULTRASOUND BILATERAL UPPER EXTREMITIES:  Findings:   Peak systolic velocities:     Left Upper Extremity Arteries:     Subclavian: 89 cm/s     Axillary artery: 38 cm/sec     Brachial proximal: 92 cm/sec   Brachial mid: 53 cm/sec  Brachial antecubital: 53 cm/sec     Radial artery: Occluded from the antecubital fossa distally     Ulnar artery proximal: 76 cm/sec  Ulnar artery mid: 26 cm/sec  Ulnar artery distal: 19 cm/sec     Impression: Occluded radial artery from the antecubital fossa  distally. Other arteries are patent.    5/22/2017 CARDIAC MRI:  IMPRESSION:  1.  Normal left ventricular size and low-normal systolic function with  a calculated ejection fraction of  55 %.  2.  Mildly dilated right ventricular size and normal right ventricular  systolic function with a calculated ejection fraction of 51%. There is  a focal area of dyskinesis in the right ventricular outflow tract, the  etiology of which is unclear. If the patient's clinical history is  compatible, this could represent the site of prior surgical  modification of the RVOT.   3.  Normal variant pulmonary venous drainage into the left atrium with  a common origin of the left pulmonary veins and measurements as  described below.   4.  Severe biatrial enlargement.  5.  Severe eccentric tricuspid regurgitation, the mechanism of which  is not clearly demonstrated.  6.  Mild to moderate mitral regurgitation.  7.  There are no intracardiac thrombi;  specifically, the left atrial  appendage is free of thrombus.  8.  On delayed enhancement imaging, there is hyperenhancement at the  RV septal insertion site. This is a nonspecific finding which can be  seen in the setting of diseases involving the right ventricle.    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/2017  - has monophasic left radial with intact ulnar signal  - audible left palmar arch present today;  hand does not appear acutely threatened at this time  - please keep hands warm with warm towels, blankets, or mitts; continue frequent vascular exams  - last documented Heparin exposure 5/16/2017; unclear if she had Heparin prior to coronary angiography on 5/29/2017  - Cardiac MRI on 5/22/2017 did not demonstrate intra-cardiac thrombus  - she is not currently on anticoagulation, may need to consider Argatroban; plan per primary team  - left hand more warm and left palmar arch signal present today, no immediate intervention required at this time  - Vascular will sign off, please contact us if any questions or changes in patient's vascular condition.       Please contact Dr. Montanez's Vascular Surgery Service with questions or concerns.    Maria Esther JOHNSON, CNS  Division of Vascular Surgery  [facility]  Pager [phone]

## 2017-05-31 NOTE — PLAN OF CARE
Problem: Goal Outcome Summary  Goal: Goal Outcome Summary  Outcome: Improving  Remains on fentanyl and versed for sedation; no sedation interruption this shift. Does occasionally open eyes to verbal stimuli; consistently opens eyes when turning/repositioning. Re-warmed with coolguard; reached target temp of 37 degrees celcius at 2300. Pt down to cath lab at 2000 last NOC. Temp pacer removed from R groin and new temp pacer inserted in R IJ. Pt returned to room at approx. 2115. Pt has been 100% paced since returning from cath lab. Titrating vasopressin and epinephrine to maintain MAP >/=65. Remains on ventilator; tolerating same well. PO2 68 on MN ABG; reported to Dr. Flores, who stated to keep pO2 >60. Remains NPO with OG to LIS. UO 25mL at 0300, 25mL at 0400, and 15mL at 0500. Reported to cards resident; awaiting orders. Turning Q2H to maintain skin integrity. Dressing changed to extravasation site on L UA. Continuing to monitor.     Problem: Cardiac: Heart Failure (Adult)  Goal: Signs and Symptoms of Listed Potential Problems Will be Absent or Manageable (Cardiac: Heart Failure)  Signs and symptoms of listed potential problems will be absent or manageable by discharge/transition of care (reference Cardiac: Heart Failure (Adult) CPG).   Outcome: Improving    05/31/17 0400   Cardiac: Heart Failure   Problems Assessed (Heart Failure) all   Problems Present (Heart Failure) cardiac pump dysfunction;decreased quality of life;situational response

## 2017-05-31 NOTE — PROGRESS NOTES
"SPIRITUAL HEALTH SERVICES Progress Note  [facility] ([place] 4E       DATA:    Routine visit with pt and her relatives (, Wolf, and sister). Wolf and pt's sister attended our noon mass today in the chapel. After mass, Wolf tearfully, said that pt opened her eyes early. \"Tears of heather,\" he said, and hugged me. Later this afternoon, I visited with them at pt's bedside for support and prayer. Pt also talked about how he met his wife while they both were employees of [facility]. Pt still intubated, but there is hope of recovering.        INTERVENTION:    Assessed pt and family needs of SHS; offered spiritual support and active listening; shared a prayer at pt's bedside with her relatives.       OUTCOME:    Pt's family are grateful because pt is showing signs of recovering, but they also understood that it's a long process and still way to go. They appreciated the spiritual and moral support they received from us since they came in. Wolf is planning to go home today and rest for while.       PLAN:     [employer] priests will continue to f/u 1-2x/wk while pt remains on ICU.                                                                                                                                             Father Dirk Mancini       "

## 2017-05-31 NOTE — PROGRESS NOTES
Cardiology Progress Note    Overnight/subj:   - RA lead replace late yesterday  - screw in RIJ TPM place  - rewarmed yesterday     VS reviewed: Notable for temp: 98.8, HR 90, MAP 63-67 but systolics 120s , oxygenating on 98%, 16/320/8/50  Tele: a paced at 90  CVP 9     Wt: on admit 63.5, yday 69.5, today 70.3  I/O: 1.6 in / 1.7 out yday. Since  in / 200 out    Drips:   Epi 0.045  Vaso 2.4    Access:  RIJ- TPM  RFV-   RFA- 4Fr a-line  LFV- TLC        PO Cardiac Meds: None  Other meds: Meropenem, vanco, gabapen, ppi, hydrocortisone    Radiology Imaging  CXR  ETT 3cm above ermelinda, TPM in RA, OG stable, diffuse interstial markings     Cardiology Studies:  ECG: A-paced narrow complex    Labs: Reviewed in epic    Phys exam:  GEN: NAD  Pulm: course breath sounds b/l, improved  Cardiac: , rrr, - murmurs  Vascular: - HERNANDO and dopplerable pulses  GI: soft, non distended    ASSESSMENT/PLAN:  Amelia Michel is a 56 year old with hx VSD s/p surgical repair (1969), RA, HTN and recently dx Afib/flutter/SVT who is presenting after a cardiac arrest. Initial rhythm at EMS arrival was shockble. ROSC obtained on the field. Initial EF rhythm was ventriclar escape. S/p Coronary angiogram with no angiographic evidence of disease.      Cause of arrest is unclear, ddx: sinus arrest 2/2 amio, amio toxicity, Aspiration PNA, VT/VF from prior surgical scar, inflammatory reaction      Patient with persistent lactate and pressor requirement. Now also with new thrombocytopenia and L radial artery occlusion.      Neuro  # Likely anoxic brain injury   # Sedation  -- s/p cooling, now rewarming  -- fentanyl, versed for sedation      CV  # hx VSD s/p Repair  # Atrial Fib, flutter and SVT (with difficult to control rates)  # Sinus arrest, s/p RA TPM  # Cardiac Arrest  # Cardiogenic Shock, EF 40-45%  # RV Strain on echo  -- d/c amiodorone, BB and diltiazem for now  -- TPM via RIJ  -- pace at 90  -- Wean epi, vaso   -- serial SVO2  -- CVP to assess  volume status   -- hold AC       Pulm  # Hypoxic respiratory failure, likely ARDS 2/2  Aspiration event vs rheum less likely cardiogenic  -- Lung protective ventilation, IBW- 45.5, currently TV- 320~ 7ml/kg  -- daily pressure support BID  -- CVP  -- Abx       ID   # ESBL UTI  # Aspiration PNA   -- Vanco per pharm, d/c if MRSA Cx negative  -- Meropenem 1G q8h  -- Bcx, Ucx, Rcx, NTD  -- fever, wbc curve       Rheum   # RA   -- Hold meds for now   -- continue stress dose steroids  -- checking cortisol level      Renal   CELSA, oliguric   UA c.w atn   Continue to trend   Avoid nephrotoxic meds   Holding off on diuresis       GI  # Shock Liver   # Feeding   -- trend LFTs  -- no indication for viral serologies at this time  --start feeds NGT     # Fluid around pancreas, lipase negative         Heme  # Coagulopathy, likely shock liver  # Previously on apixaban   # Thrombocytopenia, of unclear cause  -- continue to monitor   -- no vitK or k-centra at this time   -- Blood smear, Heme consult pending smear      Endo   Hypoglycemia  continue to monitor   On d5w and insulin gtt      Ppx   PPI  No anticoagulation at this time       Patient seen and discuss with Dr.Yannopoulus Rick Nguyễn MD   Cardiology Fellow   *[phone]        I have seen and examined the patient with the CSI team. I agree with the assessment and plan of the note above.I have reviewed pertinent labs.     Sundar Wood MD  Interventional Cardiology  Pager: [phone]

## 2017-05-31 NOTE — PROCEDURES
Small Bowel Feeding Tube Placement Assessment  Reason for Feeding Tube Placement: Request for post-pyloric FT placement by provider  Alfredo Start Time: 1:17 pm   Cortrak End Time: 1:27 pm  Medicine Delivered During Procedure: Lidocaine Gel  Placement Successful: Presume post-pyloric (pending AXR confirmation).     Procedure Complications: None  Final Placement Christos at exit of nare 87 cm  Face to Face time with patient: 30 mins    Chelsea Tang RD, LD, CNSC   CVICU Dietitian  Pager: [phone]

## 2017-05-31 NOTE — PHARMACY-CONSULT NOTE
Pharmacy Tube Feeding Consult    Medication reviewed for administration by feeding tube and for potential food/drug interactions.    Recommendation: No changes are needed at this time.     Pharmacy will continue to follow as new medications are ordered.    Zuleima Shankar, PharmD  May 31, 2017

## 2017-05-31 NOTE — CONSULTS
Social Work: Assessment with Discharge Plan    Patient Name:  Amelia Michel  :  1960  Age:  56 year old  MRN:  [id number]  Risk/Complexity Score:  Filed Complexity Screen Score: 7  Completed assessment with:  Patient's  Romeo, pt's sister, team rounds, chart review    Presenting Information   Reason for Referral:  family questions re: FMLA and short-term disability paperwork.  Date of Intake:  May 31, 2017  Referral Source:  Family  Decision Maker:  Per [facility] policy, pt's  Romeo ([phone])  Alternate Decision Maker:  Likely pt's sister.  Per Romeo, pt and he do not have children.  Health Care Directive:  Health Care Directive Agent (if patient not able to make decisions)  Living Situation:  House with  Romeo in [place].  Previous Functional Status:  Independent and working at [employer].  Patient and family understanding of hospitalization:  Pt's  appears to understand the serious nature of pt's condition and is hoping for the best.  Cultural/Language/Spiritual Considerations:  Shinto.  Father Dirk, hospital , came to visit for prayer per family request at end of visit.  Adjustment to Illness:  Pt's  is understandably tearful when discussing the unknowns re: pt's progress and future and he appears to be adjusting appropriately.    Physical Health  Reason for Admission:    1. Cardiac arrest (H)    2. Cardiac arrest with ventricular fibrillation (H)    3. Cardiogenic shock (H)      Services Needed/Recommended:  TBD pending medical course.    Mental Health/Chemical Dependency  Diagnosis:  None  Support/Services in Place:  N/a  Services Needed/Recommended:  N/a    Support System  Significant relationship at present time:   Romeo who is involved and supportive.  Family of origin is available for support:  Yes, pt's sister is present in hospital and very involved.  She lives just four houses down from pt.  Pt also has other family nearby.  Romeo's  brother lives in [place] near [place], which is where he has been staying during this admission.  Other support available:  Other family and friends.  Gaps in support system:  none  Patient is caregiver to:  None     Provider Information   Primary Care Physician:  Comfort Sabillon   [phone]   Clinic:  [facility] [address] / [place] [address]      :  none    Financial   Income Source:  Pt is a [employer] employee and works full time.  Financial Concerns:  Answered questions re: FMLA and short-term disability paperwork as pt's  plans to call HR.  He also plans to contact his own employer ([employer]) re: FMLA.  Provided SW contact information so that forms can be forwarded directly to SW to assist with completion.  Romeo does state that they are fine from a financial perspective at the moment.  Insurance:    Payor/Plan Subscriber Name Rel Member # Group #   PREFERREDONE - PEAK A* FIDELIA MIDDLETON  [id number]       [id number]       Discharge Plan   Patient and family discharge goal:  TBD pending medical course.  Barriers to discharge:  Pt is not medically stable and remains in ICU for aggressive medical care.    Additional comments   SW met with pt's  Romeo per his request.  Pt's sister was also present during conversations.  Answered questions and provided education re: FMLA and short-term disability paperwork.  Offered support and provided education re: SW role in the hospital.  Conversation was cut short d/t another provider entering the room, but SW will continue to follow for support to pt and family, resource referral, and d/c planning.    ANDIE Abbott, [facility]  Adult ICU Clinical   Pager [phone]

## 2017-05-31 NOTE — PROGRESS NOTES
Care Coordinator Progress Note     Admission Date/Time:  5/29/2017  Attending MD:  Sundar Wood MD     Data  Chart reviewed, discussed with interdisciplinary team.   Patient was admitted for:    Cardiac arrest (H)  Cardiac arrest with ventricular fibrillation (H)  Cardiogenic shock (H).    Concerns with insurance coverage for discharge needs: None.  Current Living Situation: Patient lives with spouse.  Support System: Supportive and Involved  Transportation: Family or Friend will provide  Barriers to Discharge: chronically ill    Assessment  Pt admitted after cardiac arrest.  Pt is vented and sedated.  Attempt to meet pt family to introduce self and CC role.  No family was in the room at time of my visit.     Plan  Anticipated Discharge Date:  TBD.  Anticipated Discharge Plan:   TBD.  CC will cont to follow plan of care.      Shannon Sanabria RN, BSN  4A and 4E/ ICU  Care Coordinator  Phone: [phone]  Pager: [phone]      Addendum:  Visited pt spouse and sister, Lorie this afternoon.  CC role discussed and contact info provided.  Pt spouse stated the  told him pt blinked today and he is very encouraged by the news.  Pt spouse stated the team have been keeping him updated well.  Pt spouse is RT and stated he likes to be here at the bedside when the team round to get update and ask his questions.

## 2017-06-01 ENCOUNTER — APPOINTMENT (OUTPATIENT)
Dept: GENERAL RADIOLOGY | Facility: CLINIC | Age: 57
DRG: 260 | End: 2017-06-01
Attending: INTERNAL MEDICINE
Payer: COMMERCIAL

## 2017-06-01 ENCOUNTER — ALLIED HEALTH/NURSE VISIT (OUTPATIENT)
Dept: NEUROLOGY | Facility: CLINIC | Age: 57
DRG: 260 | End: 2017-06-01
Attending: PSYCHIATRY & NEUROLOGY
Payer: COMMERCIAL

## 2017-06-01 DIAGNOSIS — R57.0 CARDIOGENIC SHOCK (H): Primary | ICD-10-CM

## 2017-06-01 LAB
ABO + RH BLD: NORMAL
ABO + RH BLD: NORMAL
ALBUMIN SERPL-MCNC: 1.9 G/DL (ref 3.4–5)
ALP SERPL-CCNC: 125 U/L (ref 40–150)
ALT SERPL W P-5'-P-CCNC: 891 U/L (ref 0–50)
ANION GAP SERPL CALCULATED.3IONS-SCNC: 5 MMOL/L (ref 3–14)
ANION GAP SERPL CALCULATED.3IONS-SCNC: 6 MMOL/L (ref 3–14)
ANION GAP SERPL CALCULATED.3IONS-SCNC: 7 MMOL/L (ref 3–14)
AST SERPL W P-5'-P-CCNC: 597 U/L (ref 0–45)
AT III ACT/NOR PPP CHRO: 55 % (ref 85–135)
BACTERIA SPEC CULT: NORMAL
BASE EXCESS BLDA CALC-SCNC: 7.4 MMOL/L
BASE EXCESS BLDA CALC-SCNC: 7.4 MMOL/L
BASE EXCESS BLDA CALC-SCNC: 7.6 MMOL/L
BASE EXCESS BLDA CALC-SCNC: 7.7 MMOL/L
BASE EXCESS BLDA CALC-SCNC: 7.8 MMOL/L
BASE EXCESS BLDA CALC-SCNC: 8 MMOL/L
BASE EXCESS BLDA CALC-SCNC: 8.2 MMOL/L
BASE EXCESS BLDA CALC-SCNC: 8.8 MMOL/L
BASE EXCESS BLDA CALC-SCNC: 9.1 MMOL/L
BASE EXCESS BLDV CALC-SCNC: 8.8 MMOL/L
BILIRUB SERPL-MCNC: 2.6 MG/DL (ref 0.2–1.3)
BLD GP AB SCN SERPL QL: NORMAL
BLOOD BANK CMNT PATIENT-IMP: NORMAL
BUN SERPL-MCNC: 55 MG/DL (ref 7–30)
BUN SERPL-MCNC: 56 MG/DL (ref 7–30)
BUN SERPL-MCNC: 57 MG/DL (ref 7–30)
C DIFF TOX B STL QL: ABNORMAL
C DIFF TOX B STL QL: NORMAL
CA-I BLD-MCNC: 3.6 MG/DL (ref 4.4–5.2)
CA-I BLD-MCNC: 4.6 MG/DL (ref 4.4–5.2)
CA-I BLD-MCNC: 4.6 MG/DL (ref 4.4–5.2)
CA-I BLD-MCNC: 4.7 MG/DL (ref 4.4–5.2)
CALCIUM SERPL-MCNC: 7.9 MG/DL (ref 8.5–10.1)
CALCIUM SERPL-MCNC: 8 MG/DL (ref 8.5–10.1)
CALCIUM SERPL-MCNC: 8.1 MG/DL (ref 8.5–10.1)
CHLORIDE SERPL-SCNC: 109 MMOL/L (ref 94–109)
CHLORIDE SERPL-SCNC: 109 MMOL/L (ref 94–109)
CHLORIDE SERPL-SCNC: 111 MMOL/L (ref 94–109)
CO2 SERPL-SCNC: 32 MMOL/L (ref 20–32)
CREAT SERPL-MCNC: 1.66 MG/DL (ref 0.52–1.04)
CREAT SERPL-MCNC: 1.71 MG/DL (ref 0.52–1.04)
CREAT SERPL-MCNC: 1.75 MG/DL (ref 0.52–1.04)
CRP SERPL-MCNC: 98 MG/L (ref 0–8)
D DIMER PPP FEU-MCNC: 4.9 UG/ML FEU (ref 0–0.5)
ERYTHROCYTE [DISTWIDTH] IN BLOOD BY AUTOMATED COUNT: 22.1 % (ref 10–15)
ERYTHROCYTE [DISTWIDTH] IN BLOOD BY AUTOMATED COUNT: 22.3 % (ref 10–15)
ERYTHROCYTE [DISTWIDTH] IN BLOOD BY AUTOMATED COUNT: 22.4 % (ref 10–15)
FIBRINOGEN PPP-MCNC: 350 MG/DL (ref 200–420)
GFR SERPL CREATININE-BSD FRML MDRD: 30 ML/MIN/1.7M2
GFR SERPL CREATININE-BSD FRML MDRD: 31 ML/MIN/1.7M2
GFR SERPL CREATININE-BSD FRML MDRD: 32 ML/MIN/1.7M2
GLUCOSE BLDC GLUCOMTR-MCNC: 106 MG/DL (ref 70–99)
GLUCOSE BLDC GLUCOMTR-MCNC: 110 MG/DL (ref 70–99)
GLUCOSE BLDC GLUCOMTR-MCNC: 114 MG/DL (ref 70–99)
GLUCOSE BLDC GLUCOMTR-MCNC: 116 MG/DL (ref 70–99)
GLUCOSE BLDC GLUCOMTR-MCNC: 117 MG/DL (ref 70–99)
GLUCOSE BLDC GLUCOMTR-MCNC: 117 MG/DL (ref 70–99)
GLUCOSE BLDC GLUCOMTR-MCNC: 121 MG/DL (ref 70–99)
GLUCOSE BLDC GLUCOMTR-MCNC: 130 MG/DL (ref 70–99)
GLUCOSE BLDC GLUCOMTR-MCNC: 131 MG/DL (ref 70–99)
GLUCOSE BLDC GLUCOMTR-MCNC: 143 MG/DL (ref 70–99)
GLUCOSE SERPL-MCNC: 123 MG/DL (ref 70–99)
GLUCOSE SERPL-MCNC: 124 MG/DL (ref 70–99)
GLUCOSE SERPL-MCNC: 133 MG/DL (ref 70–99)
HCO3 BLD-SCNC: 31 MMOL/L (ref 21–28)
HCO3 BLD-SCNC: 32 MMOL/L (ref 21–28)
HCO3 BLD-SCNC: 33 MMOL/L (ref 21–28)
HCO3 BLD-SCNC: 33 MMOL/L (ref 21–28)
HCO3 BLDV-SCNC: 33 MMOL/L (ref 21–28)
HCT VFR BLD AUTO: 23.5 % (ref 35–47)
HCT VFR BLD AUTO: 24.9 % (ref 35–47)
HCT VFR BLD AUTO: 26 % (ref 35–47)
HGB BLD-MCNC: 8.1 G/DL (ref 11.7–15.7)
HGB BLD-MCNC: 8.2 G/DL (ref 11.7–15.7)
HGB BLD-MCNC: 8.4 G/DL (ref 11.7–15.7)
HGB FREE PLAS-MCNC: 30 MG/DL
INR PPP: 1.54 (ref 0.86–1.14)
INR PPP: 1.66 (ref 0.86–1.14)
INR PPP: 1.88 (ref 0.86–1.14)
INTERPRETATION ECG - MUSE: NORMAL
LACTATE BLD-SCNC: 1.5 MMOL/L (ref 0.7–2.1)
LACTATE BLD-SCNC: 1.5 MMOL/L (ref 0.7–2.1)
LACTATE BLD-SCNC: 1.6 MMOL/L (ref 0.7–2.1)
LDH SERPL L TO P-CCNC: 583 U/L (ref 81–234)
Lab: NORMAL
MAGNESIUM SERPL-MCNC: 2.5 MG/DL (ref 1.6–2.3)
MCH RBC QN AUTO: 33.1 PG (ref 26.5–33)
MCH RBC QN AUTO: 33.6 PG (ref 26.5–33)
MCH RBC QN AUTO: 34.5 PG (ref 26.5–33)
MCHC RBC AUTO-ENTMCNC: 32.3 G/DL (ref 31.5–36.5)
MCHC RBC AUTO-ENTMCNC: 32.9 G/DL (ref 31.5–36.5)
MCHC RBC AUTO-ENTMCNC: 34.5 G/DL (ref 31.5–36.5)
MCV RBC AUTO: 100 FL (ref 78–100)
MCV RBC AUTO: 102 FL (ref 78–100)
MCV RBC AUTO: 102 FL (ref 78–100)
MICRO REPORT STATUS: NORMAL
O2/TOTAL GAS SETTING VFR VENT: 40 %
O2/TOTAL GAS SETTING VFR VENT: 50 %
OXYHGB MFR BLD: 93 % (ref 92–100)
OXYHGB MFR BLD: 94 % (ref 92–100)
OXYHGB MFR BLD: 95 % (ref 92–100)
OXYHGB MFR BLD: 95 % (ref 92–100)
OXYHGB MFR BLD: 96 % (ref 92–100)
OXYHGB MFR BLDV: 65 %
PCO2 BLD: 39 MM HG (ref 35–45)
PCO2 BLD: 40 MM HG (ref 35–45)
PCO2 BLD: 41 MM HG (ref 35–45)
PCO2 BLD: 42 MM HG (ref 35–45)
PCO2 BLDV: 45 MM HG (ref 40–50)
PH BLD: 7.49 PH (ref 7.35–7.45)
PH BLD: 7.5 PH (ref 7.35–7.45)
PH BLD: 7.51 PH (ref 7.35–7.45)
PH BLD: 7.52 PH (ref 7.35–7.45)
PH BLD: 7.52 PH (ref 7.35–7.45)
PH BLD: 7.53 PH (ref 7.35–7.45)
PH BLDV: 7.48 PH (ref 7.32–7.43)
PHOSPHATE SERPL-MCNC: 5.3 MG/DL (ref 2.5–4.5)
PLATELET # BLD AUTO: 43 10E9/L (ref 150–450)
PLATELET # BLD AUTO: 49 10E9/L (ref 150–450)
PLATELET # BLD AUTO: 50 10E9/L (ref 150–450)
PO2 BLD: 79 MM HG (ref 80–105)
PO2 BLD: 80 MM HG (ref 80–105)
PO2 BLD: 82 MM HG (ref 80–105)
PO2 BLD: 86 MM HG (ref 80–105)
PO2 BLD: 87 MM HG (ref 80–105)
PO2 BLD: 88 MM HG (ref 80–105)
PO2 BLD: 88 MM HG (ref 80–105)
PO2 BLDV: 37 MM HG (ref 25–47)
POTASSIUM SERPL-SCNC: 3.7 MMOL/L (ref 3.4–5.3)
POTASSIUM SERPL-SCNC: 3.9 MMOL/L (ref 3.4–5.3)
POTASSIUM SERPL-SCNC: 4 MMOL/L (ref 3.4–5.3)
PROCALCITONIN SERPL-MCNC: 43.91 NG/ML
PROT SERPL-MCNC: 4.2 G/DL (ref 6.8–8.8)
RBC # BLD AUTO: 2.35 10E12/L (ref 3.8–5.2)
RBC # BLD AUTO: 2.44 10E12/L (ref 3.8–5.2)
RBC # BLD AUTO: 2.54 10E12/L (ref 3.8–5.2)
SODIUM SERPL-SCNC: 146 MMOL/L (ref 133–144)
SODIUM SERPL-SCNC: 147 MMOL/L (ref 133–144)
SODIUM SERPL-SCNC: 149 MMOL/L (ref 133–144)
SPECIMEN EXP DATE BLD: NORMAL
SPECIMEN SOURCE: ABNORMAL
SPECIMEN SOURCE: NORMAL
SPECIMEN SOURCE: NORMAL
WBC # BLD AUTO: 4.7 10E9/L (ref 4–11)
WBC # BLD AUTO: 5.1 10E9/L (ref 4–11)
WBC # BLD AUTO: 5.9 10E9/L (ref 4–11)

## 2017-06-01 PROCEDURE — 93010 ELECTROCARDIOGRAM REPORT: CPT | Mod: 76 | Performed by: INTERNAL MEDICINE

## 2017-06-01 PROCEDURE — 25000128 H RX IP 250 OP 636: Performed by: INTERNAL MEDICINE

## 2017-06-01 PROCEDURE — 40000275 ZZH STATISTIC RCP TIME EA 10 MIN

## 2017-06-01 PROCEDURE — 25000125 ZZHC RX 250: Performed by: STUDENT IN AN ORGANIZED HEALTH CARE EDUCATION/TRAINING PROGRAM

## 2017-06-01 PROCEDURE — 83735 ASSAY OF MAGNESIUM: CPT | Performed by: INTERNAL MEDICINE

## 2017-06-01 PROCEDURE — 82805 BLOOD GASES W/O2 SATURATION: CPT | Performed by: INTERNAL MEDICINE

## 2017-06-01 PROCEDURE — 36415 COLL VENOUS BLD VENIPUNCTURE: CPT | Performed by: INTERNAL MEDICINE

## 2017-06-01 PROCEDURE — 25000132 ZZH RX MED GY IP 250 OP 250 PS 637: Performed by: INTERNAL MEDICINE

## 2017-06-01 PROCEDURE — 83051 HEMOGLOBIN PLASMA: CPT | Performed by: INTERNAL MEDICINE

## 2017-06-01 PROCEDURE — 25000125 ZZHC RX 250: Performed by: INTERNAL MEDICINE

## 2017-06-01 PROCEDURE — 80048 BASIC METABOLIC PNL TOTAL CA: CPT | Performed by: INTERNAL MEDICINE

## 2017-06-01 PROCEDURE — 83615 LACTATE (LD) (LDH) ENZYME: CPT | Performed by: INTERNAL MEDICINE

## 2017-06-01 PROCEDURE — 84145 PROCALCITONIN (PCT): CPT | Performed by: INTERNAL MEDICINE

## 2017-06-01 PROCEDURE — 86901 BLOOD TYPING SEROLOGIC RH(D): CPT | Performed by: INTERNAL MEDICINE

## 2017-06-01 PROCEDURE — 87040 BLOOD CULTURE FOR BACTERIA: CPT | Performed by: INTERNAL MEDICINE

## 2017-06-01 PROCEDURE — 94003 VENT MGMT INPAT SUBQ DAY: CPT

## 2017-06-01 PROCEDURE — 87493 C DIFF AMPLIFIED PROBE: CPT | Performed by: INTERNAL MEDICINE

## 2017-06-01 PROCEDURE — 85610 PROTHROMBIN TIME: CPT | Performed by: INTERNAL MEDICINE

## 2017-06-01 PROCEDURE — 71010 XR CHEST PORT 1 VW: CPT

## 2017-06-01 PROCEDURE — 83605 ASSAY OF LACTIC ACID: CPT | Performed by: INTERNAL MEDICINE

## 2017-06-01 PROCEDURE — 84100 ASSAY OF PHOSPHORUS: CPT | Performed by: INTERNAL MEDICINE

## 2017-06-01 PROCEDURE — 85379 FIBRIN DEGRADATION QUANT: CPT | Performed by: INTERNAL MEDICINE

## 2017-06-01 PROCEDURE — 86900 BLOOD TYPING SEROLOGIC ABO: CPT | Performed by: INTERNAL MEDICINE

## 2017-06-01 PROCEDURE — 80053 COMPREHEN METABOLIC PANEL: CPT | Performed by: INTERNAL MEDICINE

## 2017-06-01 PROCEDURE — 40000141 ZZH STATISTIC PERIPHERAL IV START W/O US GUIDANCE

## 2017-06-01 PROCEDURE — 40000196 ZZH STATISTIC RAPCV CVP MONITORING

## 2017-06-01 PROCEDURE — 82330 ASSAY OF CALCIUM: CPT | Performed by: INTERNAL MEDICINE

## 2017-06-01 PROCEDURE — 40000014 ZZH STATISTIC ARTERIAL MONITORING DAILY

## 2017-06-01 PROCEDURE — 25000128 H RX IP 250 OP 636

## 2017-06-01 PROCEDURE — 86850 RBC ANTIBODY SCREEN: CPT | Performed by: INTERNAL MEDICINE

## 2017-06-01 PROCEDURE — 99233 SBSQ HOSP IP/OBS HIGH 50: CPT | Mod: GC | Performed by: INTERNAL MEDICINE

## 2017-06-01 PROCEDURE — 93005 ELECTROCARDIOGRAM TRACING: CPT

## 2017-06-01 PROCEDURE — 85384 FIBRINOGEN ACTIVITY: CPT | Performed by: INTERNAL MEDICINE

## 2017-06-01 PROCEDURE — 25000132 ZZH RX MED GY IP 250 OP 250 PS 637: Performed by: NURSE PRACTITIONER

## 2017-06-01 PROCEDURE — 85027 COMPLETE CBC AUTOMATED: CPT | Performed by: INTERNAL MEDICINE

## 2017-06-01 PROCEDURE — 86140 C-REACTIVE PROTEIN: CPT | Performed by: INTERNAL MEDICINE

## 2017-06-01 PROCEDURE — 95951 ZZHC EEG VIDEO EACH 24 HR: CPT | Mod: ZF

## 2017-06-01 PROCEDURE — 85300 ANTITHROMBIN III ACTIVITY: CPT | Performed by: INTERNAL MEDICINE

## 2017-06-01 PROCEDURE — 20000004 ZZH R&B ICU UMMC

## 2017-06-01 PROCEDURE — 00000146 ZZHCL STATISTIC GLUCOSE BY METER IP

## 2017-06-01 PROCEDURE — 27210437 ZZH NUTRITION PRODUCT SEMIELEM INTERMED LITER

## 2017-06-01 RX ORDER — POTASSIUM CHLORIDE 750 MG/1
20-40 TABLET, EXTENDED RELEASE ORAL
Status: DISCONTINUED | OUTPATIENT
Start: 2017-06-01 | End: 2017-06-04

## 2017-06-01 RX ORDER — SODIUM CHLORIDE, SODIUM LACTATE, POTASSIUM CHLORIDE, CALCIUM CHLORIDE 600; 310; 30; 20 MG/100ML; MG/100ML; MG/100ML; MG/100ML
INJECTION, SOLUTION INTRAVENOUS
Status: COMPLETED
Start: 2017-06-01 | End: 2017-06-02

## 2017-06-01 RX ORDER — POTASSIUM CHLORIDE 1.5 G/1.58G
20-40 POWDER, FOR SOLUTION ORAL
Status: DISCONTINUED | OUTPATIENT
Start: 2017-06-01 | End: 2017-06-04

## 2017-06-01 RX ORDER — MAGNESIUM SULFATE HEPTAHYDRATE 40 MG/ML
4 INJECTION, SOLUTION INTRAVENOUS EVERY 4 HOURS PRN
Status: DISCONTINUED | OUTPATIENT
Start: 2017-06-01 | End: 2017-06-04

## 2017-06-01 RX ORDER — POTASSIUM CHLORIDE 7.45 MG/ML
10 INJECTION INTRAVENOUS
Status: DISCONTINUED | OUTPATIENT
Start: 2017-06-01 | End: 2017-06-04

## 2017-06-01 RX ORDER — LEVETIRACETAM 100 MG/ML
1000 SOLUTION ORAL 2 TIMES DAILY
Status: DISCONTINUED | OUTPATIENT
Start: 2017-06-02 | End: 2017-06-05

## 2017-06-01 RX ORDER — POTASSIUM CHLORIDE 29.8 MG/ML
20 INJECTION INTRAVENOUS
Status: DISCONTINUED | OUTPATIENT
Start: 2017-06-01 | End: 2017-06-04

## 2017-06-01 RX ORDER — DIGOXIN 125 MCG
125 TABLET ORAL DAILY
Status: DISCONTINUED | OUTPATIENT
Start: 2017-06-02 | End: 2017-06-08

## 2017-06-01 RX ORDER — POTASSIUM CL/LIDO/0.9 % NACL 10MEQ/0.1L
10 INTRAVENOUS SOLUTION, PIGGYBACK (ML) INTRAVENOUS
Status: DISCONTINUED | OUTPATIENT
Start: 2017-06-01 | End: 2017-06-04

## 2017-06-01 RX ORDER — DIGOXIN 0.25 MG/ML
250 INJECTION INTRAMUSCULAR; INTRAVENOUS ONCE
Status: COMPLETED | OUTPATIENT
Start: 2017-06-01 | End: 2017-06-01

## 2017-06-01 RX ADMIN — HYDROCORTISONE SODIUM SUCCINATE 100 MG: 100 INJECTION, POWDER, FOR SOLUTION INTRAMUSCULAR; INTRAVENOUS at 19:44

## 2017-06-01 RX ADMIN — HYDROCORTISONE SODIUM SUCCINATE 100 MG: 100 INJECTION, POWDER, FOR SOLUTION INTRAMUSCULAR; INTRAVENOUS at 03:48

## 2017-06-01 RX ADMIN — MEROPENEM 1 G: 1 INJECTION, POWDER, FOR SOLUTION INTRAVENOUS at 07:39

## 2017-06-01 RX ADMIN — PANTOPRAZOLE SODIUM 40 MG: 40 INJECTION, POWDER, FOR SOLUTION INTRAVENOUS at 07:39

## 2017-06-01 RX ADMIN — Medication 50 MCG/HR: at 01:14

## 2017-06-01 RX ADMIN — MIDAZOLAM HYDROCHLORIDE 2.5 MG/HR: 5 INJECTION, SOLUTION INTRAMUSCULAR; INTRAVENOUS at 05:52

## 2017-06-01 RX ADMIN — SILVER SULFADIAZINE: 10 CREAM TOPICAL at 01:16

## 2017-06-01 RX ADMIN — MINERAL OIL AND WHITE PETROLATUM: 150; 830 OINTMENT OPHTHALMIC at 13:56

## 2017-06-01 RX ADMIN — PHYTONADIONE 5 MG: 5 TABLET ORAL at 07:41

## 2017-06-01 RX ADMIN — HUMAN INSULIN 0.5 UNITS/HR: 100 INJECTION, SOLUTION SUBCUTANEOUS at 04:21

## 2017-06-01 RX ADMIN — MULTIVIT AND MINERALS-FERROUS GLUCONATE 9 MG IRON/15 ML ORAL LIQUID 15 ML: at 07:39

## 2017-06-01 RX ADMIN — POTASSIUM CHLORIDE 20 MEQ: 29.8 INJECTION, SOLUTION INTRAVENOUS at 14:40

## 2017-06-01 RX ADMIN — OYSTER SHELL CALCIUM WITH VITAMIN D 2 TABLET: 500; 200 TABLET, FILM COATED ORAL at 07:39

## 2017-06-01 RX ADMIN — SENNOSIDES AND DOCUSATE SODIUM 1 TABLET: 8.6; 5 TABLET ORAL at 07:40

## 2017-06-01 RX ADMIN — HYDROCORTISONE SODIUM SUCCINATE 100 MG: 100 INJECTION, POWDER, FOR SOLUTION INTRAMUSCULAR; INTRAVENOUS at 11:43

## 2017-06-01 RX ADMIN — CALCIUM CHLORIDE 1 G: 100 INJECTION INTRAVENOUS; INTRAVENTRICULAR at 05:30

## 2017-06-01 RX ADMIN — LEVETIRACETAM 2000 MG: 100 INJECTION, SOLUTION INTRAVENOUS at 16:51

## 2017-06-01 RX ADMIN — GABAPENTIN 200 MG: 250 SOLUTION ORAL at 19:42

## 2017-06-01 RX ADMIN — MINERAL OIL AND WHITE PETROLATUM: 150; 830 OINTMENT OPHTHALMIC at 06:00

## 2017-06-01 RX ADMIN — DIGOXIN 250 MCG: 0.25 INJECTION INTRAMUSCULAR; INTRAVENOUS at 15:48

## 2017-06-01 RX ADMIN — GABAPENTIN 200 MG: 250 SOLUTION ORAL at 07:39

## 2017-06-01 RX ADMIN — CALCIUM GLUCONATE 2 G: 94 INJECTION, SOLUTION INTRAVENOUS at 04:44

## 2017-06-01 RX ADMIN — SODIUM CHLORIDE, POTASSIUM CHLORIDE, SODIUM LACTATE AND CALCIUM CHLORIDE 250 ML: 600; 310; 30; 20 INJECTION, SOLUTION INTRAVENOUS at 23:48

## 2017-06-01 RX ADMIN — POTASSIUM CHLORIDE 20 MEQ: 29.8 INJECTION, SOLUTION INTRAVENOUS at 04:35

## 2017-06-01 RX ADMIN — FOLIC ACID 1 MG: 1 TABLET ORAL at 07:40

## 2017-06-01 RX ADMIN — MEROPENEM 1 G: 1 INJECTION, POWDER, FOR SOLUTION INTRAVENOUS at 19:44

## 2017-06-01 RX ADMIN — GABAPENTIN 200 MG: 250 SOLUTION ORAL at 13:56

## 2017-06-01 RX ADMIN — MINERAL OIL AND WHITE PETROLATUM: 150; 830 OINTMENT OPHTHALMIC at 21:54

## 2017-06-01 ASSESSMENT — ACTIVITIES OF DAILY LIVING (ADL)
SWALLOWING: 0-->SWALLOWS FOODS/LIQUIDS WITHOUT DIFFICULTY
COGNITION: 0 - NO COGNITION ISSUES REPORTED
TOILETING: 0-->INDEPENDENT
RETIRED_EATING: 0-->INDEPENDENT
RETIRED_COMMUNICATION: 0-->UNDERSTANDS/COMMUNICATES WITHOUT DIFFICULTY
BATHING: 0-->INDEPENDENT
DRESS: 0-->INDEPENDENT
AMBULATION: 0-->INDEPENDENT
NUMBER_OF_TIMES_PATIENT_HAS_FALLEN_WITHIN_LAST_SIX_MONTHS: 1
TRANSFERRING: 0-->INDEPENDENT
FALL_HISTORY_WITHIN_LAST_SIX_MONTHS: YES

## 2017-06-01 NOTE — PLAN OF CARE
Problem: Goal Outcome Summary  Goal: Goal Outcome Summary  Outcome: No Change  D: Pt with witnessed cardiac arrest admitted on Monday, hypothermia protocol initiated and rewarmed as of 5/30. Remains intubated.   I/A: Pt remains in Afib overnight rate 100-130's, higher rates when pt is awake. BP now tolerating rhythm, remains off Epi and low dose Vaso on/off. Calcium total of 3 gm given per MD request and Potassium given as well.   CVP 12.  UOP ~ 30 cc/hour, ansemlo with sediment.   Platelets low at 43, team aware no transfusion at this time. Hgb 7.6 then 8.1 this am.   Vent settings unchanged other then wean of FiO2 to 40%, per MD request. Subsequent ABG unchanged, pH alkalotic. Lungs intermitently clear to coarse/diminshed.  Neurologically pt opens eye to command, slight spontaneous movement of legs and arms bilaterally but not on command. Versed/Fentanyl gtts continue.  P: Continue to closely monitor and update family/team as indicated.

## 2017-06-01 NOTE — MR AVS SNAPSHOT
After Visit Summary   6/1/2017    Amelia Michel    MRN: [id number]           Patient Information     Date Of Birth          1960        Visit Information        Provider Department      6/1/2017 7:00 AM [facility] EEG TECH 4 [facility]        Today's Diagnoses     Cardiogenic shock (H)    -  1       Follow-ups after your visit        Your next 10 appointments already scheduled     Jun 02, 2017  7:00 AM CDT   24 Hour Video Visit with [facility] EEG TECH 4   [facility] ([facility])    [facility]  [address] [address] [phone]           [place]: Your appointment is scheduled at [facility]. [address] [place] [address]              Who to contact     Please call your clinic at [phone] to:    Ask questions about your health    Make or cancel appointments    Discuss your medicines    Learn about your test results    Speak to your doctor   If you have compliments or concerns about an experience at your clinic, or if you wish to file a complaint, please contact [facility] Patient Relations at [phone] or email us at [email or web address]         Additional Information About Your Visit        MyChart Information     [employer] gives you secure access to your electronic health record. If you see a primary care provider, you can also send messages to your care team and make appointments. If you have questions, please call your primary care clinic.  If you do not have a primary care provider, please call [phone] and they will assist you.      [employer] is an electronic gateway that provides easy, online access to your medical records. With [employer], you can request a clinic appointment, read your test results, renew a prescription or communicate with your care team.     To access your existing account, please contact your [facility] Clinic or  call [phone] for assistance.        Care EveryWhere ID     This is your Care EveryWhere ID. This could be used by other organizations to access your [facility] medical records  [id number]         Blood Pressure from Last 3 Encounters:   06/01/17 109/65   05/23/17 125/53   04/04/17 104/78    Weight from Last 3 Encounters:   06/01/17 73 kg (160 lb 15 oz)   05/23/17 64.4 kg (141 lb 15.6 oz)   04/04/17 61.6 kg (135 lb 12.8 oz)              We Performed the Following     Glucose by meter     Glucose by meter     Glucose by meter          Today's Medication Changes      Notice     This visit is during an admission. Changes to the med list made in this visit will be reflected in the After Visit Summary of the admission.             Primary Care Provider Office Phone # Fax #    Comfort Sabillon [phone]       [facility] [address] [facility] [address]        Thank you!     Thank you for choosing [facility]  for your care. Our goal is always to provide you with excellent care. Hearing back from our patients is one way we can continue to improve our services. Please take a few minutes to complete the written survey that you may receive in the mail after your visit with us. Thank you!             Your Updated Medication List - Protect others around you: Learn how to safely use, store and throw away your medicines at www.disposemymeds.org.      Notice     This visit is during an admission. Changes to the med list made in this visit will be reflected in the After Visit Summary of the admission.

## 2017-06-01 NOTE — PROGRESS NOTES
SPIRITUAL HEALTH SERVICES Progress Note  [facility] 4E       DATA:    Routine visit with pt and her sister. According to her nurse, pt was stable at that them, but still intubated and sedated.        INTERVENTION:    Assessed pt's health condition; consultation with pt's nurse; offered support and blessing.       OUTCOME:    Pt's sister appreciated the visit and expressed gratitude.       PLAN:    I will continue to f/u pt/family 1-2x/wk while pt remains on ICU.                                                                                                                                             Father Dirk Benderlain

## 2017-06-01 NOTE — PLAN OF CARE
Problem: Goal Outcome Summary  Goal: Goal Outcome Summary  Outcome: Improving  D/I: Weaning pressors to maintain MAP >65. Epi gtt off @ 16:00. Vaso gtt @ 2.5 u/hr. NS 500cc bolus slowly x2 today for low UV ,  rising Cr and CVP 7. Opens eyes and weakly follows simple commands to SAMPSON. Intermittently nodded appropriately to questions. Remains lightly sedated on Versed/ fentanyl gtts. Paced @ 90 via temp pacer until converted to Afib 100-120s @ 18:10. MD notified. EKG done. B/p decreased slightly, but maintaining MAP 64-70. INR 3;  Plt ct 47; Hgb 7.1 (no source of bleeding apparent - abd soft, no hematomas). Transfused 1 unit each PRBCs, Plts and FFP.  here and kept updated.   A: improved B/P and UV w/ fluid boluses. CVP 9.  P: continue to monitor closely. Recheck INR and Hgb/ Plts @ 22:00.

## 2017-06-01 NOTE — PROCEDURES
EEG#:  -3      DATE OF STUDY:  2017      TYPE OF STUDY:  Inpatient video EEG monitoring.      DURATION OF RECORDIN hours, 51 minutes, 38 seconds.      HISTORY:  Day #3 of video-EEG monitoring in patient Amelia Michel, a 56-year-old, who is status post cardiac arrest who has undergone therapeutic hypothermia.  She is being evaluated for seizures.  She is being treated with gabapentin.  She has been warmed now for more than 24 hours.  Sedative drips continue in use with fentanyl at 50 mcg per hour and midazolam intermittently at 2.5 mg per hour throughout this study.      TECHNICAL SUMMARY:  EEG was recorded from scalp electrodes placed according to the 10-20 international system.  Additional electrodes were utilized for referencing, artifact detection and to record from additional cerebral regions.  Video was continuously recorded.  Video was reviewed for clinical correlation and to assist with EEG interpretation.        FINDINGS:  Record consists of moderate amplitude irregular reasonably continuous delta.  There are occasional periods of desynchronization.  There are other periods in which diffuse theta predominates with somewhat less delta.  A well-organized posterior dominant rhythm is not seen at any point.  Normal patterns of wakefulness and sleep are not seen either.      Stimulation is performed following stimulation in multiple modalities is performed following ICU protocol.  Stimulation is not associated with any significant changes in background.        OTHER INTERICTAL ABNORMALITIES:  Left posterior hemisphere epileptiform spikes are seen on occasion.  These tend to be grouped together with 4 or 5 spikes in the 15 second page occurring for several minutes on and then being absent for long periods of time.  The spikes occur irregularly and are not organized into periodic discharges.  They clearly have an epileptiform appearance and usually have a time base of less than 70 milliseconds.   Though they have a bilateral representation, evaluation with headband montage shows consistent phase reversals at O1.      ICTAL:  No electrographic seizures.      IMPRESSION:  Abnormal.  There is evidence of moderate to marked diffuse encephalopathy.  EEG does vary but there is no reactivity to stimulation and no normal background.  There is clear improvement following warming.  Nonetheless, the EEG continues consistent with moderate diffuse encephalopathy.  An active epileptiform focus has developed in the last 24 hours in the left posterior hemisphere.  This indicates focal cortical irritability in this region.  Epileptiform activity in this setting has been associated with clinical occurrence or electrographic occurrence of seizures.  Overt seizures, however, have not been seen in this study.     KRISTOPHER LUU MD             D: 2017 15:34   T: 2017 16:18   MT: ELVI      Name:     FIDELIA MIDDLETON   MRN:      [id number]        Account:        [id number]   :      1960           Procedure Date: 2017      Document: [id number]

## 2017-06-01 NOTE — PLAN OF CARE
Problem: Goal Outcome Summary  Goal: Goal Outcome Summary  Outcome: Improving  Pt remains intubated, lethargic. Sedation weaned this a.m. Versed off, Fentanyl weaned to 25mcg. VSS, normotensive off all pressors. Issues with TPM this afternoon. Pacing irregular, MD notified and at bedside. TPM rate decreased to 74 from 90, and mA increased to 5.0. EKG performed, pt in known A-Fib. Notify MD if rate decreases and loss of MAP noted. Lungs coarse/clear with suctioning. Diminished breath sounds in bases bilaterally. EEG team notified MD for irregular activity - see note, family updated. Pt nods yes/not appropriately when awake, and did respond appropriately to upper extremity commands. Very weak. L arm extravasation dressing changed, small amount of drainage noted. BM x2, loose, odorous, sent sample for possible C.Diff and placed patient in Enteric Isolation. TF increased to 20mL/hr and FWF increased to 30mL q2h. Goal rate is 40, next increase (to 30mL) is due at 2100. Skin intact unless otherwise noted. UOP ~35/hr. MD aware. CVP 11 at 1200 and 1600. Will continue to monitor patient. Will update MD with further questions/concerns.

## 2017-06-01 NOTE — PROGRESS NOTES
CLINICAL NUTRITION SERVICES - progress toward nutrition POC. See 5/31 note for full assessment.    -EN: Peptamen 1.5 running @ 10 mL/hr via NDT  -CV: pressors weaned off w/ MAPs >60. Lactic acid trended down to 1.6.   -GI: No BM yesterday but on bowel regimen.    Interventions  Collaboration with other providers - Discussed adv TF toward goal w/ MD.  Enteral Nutrition - Ordered to adv Peptamen 1.5 by 10 mL q8h to goal 40 ml/hr (960 ml/day) to provide 1440 kcals (30 kcal/kg), 65 g PRO (1.4 g/kg), 739 ml free H2O, 54 g Fat (70% from MCTs), 180 g CHO and no Fiber daily.      RD will continue to follow.    Chelsea Tang RD, LD, [facility]  CVICU Dietitian  Pager: [phone]

## 2017-06-01 NOTE — PROGRESS NOTES
Cardiology Progress Note    Overnight/subj:   Epi weaned off yesterday  Off vaso  required several bolus of fluids  Started feeds yesterday     VS reviewed: Notable for temp: AF, HR , MAP 55-92, oxygenating on 97% on 16/320/8/60  Tele: a fib rate 100  CVP 12    Wt: on admit 63.5, yday 70, today 73  I/O: 3.5 in / 875 out yday. Since  in / 420 out    Access:  RIJ- TPM  RFV-   RFA- 4Fr a-line  LFV- TLC     Drips:   Insulin   d5w  Fentanyl  Versed        PO Cardiac Meds: None  Other meds: Meropenem, vanco, gabapen, ppi, hydrocortisone    Radiology Imaging  CXR  ETT 3.2cm above ermelinda, TPM in RA, OG stable, diffuse interstial markings improving    Cardiology Studies:  No new studies     Labs: Reviewed in epic    Phys exam:  GEN: NAD  Pulm: course breath sounds b/l improved  Cardiac: JVP >10, - murmurs  Vascular: mild HERNANDO and unchanged  pulses  GI: soft, non distended    ASSESSMENT/PLAN:  Amelia Michel is a 56 year old with hx VSD s/p surgical repair (1969), RA, HTN and recently dx Afib/flutter/SVT who is presenting after a cardiac arrest. Initial rhythm at EMS arrival was shockble. ROSC obtained on the field. Initial EF rhythm was ventriclar escape. S/p Coronary angiogram with no angiographic evidence of disease.       Cause of arrest is unclear, ddx: sinus arrest 2/2 amio, amio toxicity, Aspiration PNA, VT/VF from prior surgical scar, inflammatory reaction      Continue to improve.       Neuro  # Likely anoxic brain injury   # Sedation  -- s/p cooling, now rewarmed  -- fentanyl, versed for sedation      CV  # hx VSD s/p Repair  # Atrial Fib, flutter and SVT (with difficult to control rates)  # Sinus arrest, s/p RA TPM  # Cardiac Arrest  # Cardiogenic Shock, EF 40-45%  # RV Strain on echo  -- d/c amiodorone, BB and diltiazem for now  -- TPM via RIJ  -- pace at 90  -- off  epi, vaso   -- serial SVO2  -- CVP to assess volume status   -- hold AC       Pulm  # Hypoxic respiratory failure, likely ARDS 2/2   Aspiration event vs rheum less likely cardiogenic  -- Lung protective ventilation, IBW- 45.5, currently TV- 320~ 7ml/kg  -- daily pressure support BID, once groin lines removed  -- CVP  -- Abx       ID   # ESBL UTI  # Aspiration PNA   -- d/c vanc  -- Meropenem 1G q8h  -- procal, if negaive will stop alysha  -- Bcx, Ucx, Rcx, NTD  -- fever, wbc curve       Rheum   # RA   -- Hold meds for now   -- continue stress dose steroids  --cortisol 38      Renal   CELSA, oliguric   UA c.w atn   Continue to trend   Avoid nephrotoxic meds   Holding off on diuresis       GI  # Shock Liver   # Feeding   -- trend LFTs  -- no indication for viral serologies at this time  --advanced feeds NGT      # Fluid around pancreas, lipase negative          Heme  # Coagulopathy, likely shock liver  # Previously on apixaban   # Thrombocytopenia, of unclear cause  -- continue to monitor   -- no vitK or k-centra at this time   -- Blood smear> showed thrombocytopenia, Heme consult pending smear      Endo   Hypoglycemia  continue to monitor   On d5w and insulin gtt      Ppx   PPI  No anticoagulation at this time          Rick Nguyễn MD   Cardiology Fellow   *[phone]      I have seen and examined the patient with the CSI team. I agree with the assessment and plan of the note above.I have reviewed pertinent labs.     Sundar Wood MD  Interventional Cardiology  Pager: [phone]

## 2017-06-01 NOTE — PROGRESS NOTES
EEG CLINICAL NEUROPHYSIOLOGY PRELIMINARY REPORT    Video-EEG through 0600 today reviewed; full report to follow. Continues on low dose fentanyl and versed. EEG background continues improved compared to period in which patient was cooled. Periods of near continuous delta of moderate amplitude. Periods of diffuse alpha. EEG is not reactive.    A new finding is intermittent left occipital epileptiform activity. There are no electrographic seizures.    Study consistent with moderate to marked diffuse encephalopathy but clearly better than when patient hypothermic. The appearance of epileptiform activity indicates a focal seizure tendency. Overt seizures have not been recorded thus far. However epileptiform activity in this setting has been associated with the presence of electrographic or clinical seizures at some point during the hospitalization so treatment with antiseizure medications could be considered at least temporarily.    Hi Higginbotham MD  Pager [phone]

## 2017-06-02 ENCOUNTER — APPOINTMENT (OUTPATIENT)
Dept: GENERAL RADIOLOGY | Facility: CLINIC | Age: 57
DRG: 260 | End: 2017-06-02
Attending: INTERNAL MEDICINE
Payer: COMMERCIAL

## 2017-06-02 ENCOUNTER — ALLIED HEALTH/NURSE VISIT (OUTPATIENT)
Dept: NEUROLOGY | Facility: CLINIC | Age: 57
DRG: 260 | End: 2017-06-02
Attending: PSYCHIATRY & NEUROLOGY
Payer: COMMERCIAL

## 2017-06-02 DIAGNOSIS — R57.0 CARDIOGENIC SHOCK (H): Primary | ICD-10-CM

## 2017-06-02 LAB
ANION GAP SERPL CALCULATED.3IONS-SCNC: 5 MMOL/L (ref 3–14)
ANION GAP SERPL CALCULATED.3IONS-SCNC: 6 MMOL/L (ref 3–14)
BACTERIA SPEC CULT: NORMAL
BASE EXCESS BLDA CALC-SCNC: 6.4 MMOL/L
BASE EXCESS BLDA CALC-SCNC: 7.8 MMOL/L
BUN SERPL-MCNC: 50 MG/DL (ref 7–30)
BUN SERPL-MCNC: 56 MG/DL (ref 7–30)
CA-I BLD-MCNC: 4.6 MG/DL (ref 4.4–5.2)
CA-I BLD-MCNC: 4.7 MG/DL (ref 4.4–5.2)
CALCIUM SERPL-MCNC: 7.7 MG/DL (ref 8.5–10.1)
CALCIUM SERPL-MCNC: 8 MG/DL (ref 8.5–10.1)
CHLORIDE SERPL-SCNC: 112 MMOL/L (ref 94–109)
CHLORIDE SERPL-SCNC: 113 MMOL/L (ref 94–109)
CO2 SERPL-SCNC: 31 MMOL/L (ref 20–32)
CO2 SERPL-SCNC: 31 MMOL/L (ref 20–32)
CREAT SERPL-MCNC: 1.16 MG/DL (ref 0.52–1.04)
CREAT SERPL-MCNC: 1.36 MG/DL (ref 0.52–1.04)
CRP SERPL-MCNC: 55 MG/L (ref 0–8)
ERYTHROCYTE [DISTWIDTH] IN BLOOD BY AUTOMATED COUNT: 20.7 % (ref 10–15)
ERYTHROCYTE [DISTWIDTH] IN BLOOD BY AUTOMATED COUNT: 21.8 % (ref 10–15)
FIBRINOGEN PPP-MCNC: 305 MG/DL (ref 200–420)
GFR SERPL CREATININE-BSD FRML MDRD: 40 ML/MIN/1.7M2
GFR SERPL CREATININE-BSD FRML MDRD: 48 ML/MIN/1.7M2
GLUCOSE BLDC GLUCOMTR-MCNC: 101 MG/DL (ref 70–99)
GLUCOSE BLDC GLUCOMTR-MCNC: 125 MG/DL (ref 70–99)
GLUCOSE BLDC GLUCOMTR-MCNC: 141 MG/DL (ref 70–99)
GLUCOSE BLDC GLUCOMTR-MCNC: 144 MG/DL (ref 70–99)
GLUCOSE BLDC GLUCOMTR-MCNC: 145 MG/DL (ref 70–99)
GLUCOSE BLDC GLUCOMTR-MCNC: 149 MG/DL (ref 70–99)
GLUCOSE BLDC GLUCOMTR-MCNC: 156 MG/DL (ref 70–99)
GLUCOSE BLDC GLUCOMTR-MCNC: 160 MG/DL (ref 70–99)
GLUCOSE BLDC GLUCOMTR-MCNC: 164 MG/DL (ref 70–99)
GLUCOSE SERPL-MCNC: 111 MG/DL (ref 70–99)
GLUCOSE SERPL-MCNC: 168 MG/DL (ref 70–99)
HCO3 BLD-SCNC: 31 MMOL/L (ref 21–28)
HCO3 BLD-SCNC: 32 MMOL/L (ref 21–28)
HCT VFR BLD AUTO: 25.9 % (ref 35–47)
HCT VFR BLD AUTO: 26.4 % (ref 35–47)
HGB BLD-MCNC: 8.5 G/DL (ref 11.7–15.7)
HGB BLD-MCNC: 8.6 G/DL (ref 11.7–15.7)
INR PPP: 1.25 (ref 0.86–1.14)
INR PPP: 1.38 (ref 0.86–1.14)
INTERPRETATION ECG - MUSE: NORMAL
LDH SERPL L TO P-CCNC: 406 U/L (ref 81–234)
MAGNESIUM SERPL-MCNC: 2.5 MG/DL (ref 1.6–2.3)
MCH RBC QN AUTO: 33.5 PG (ref 26.5–33)
MCH RBC QN AUTO: 33.9 PG (ref 26.5–33)
MCHC RBC AUTO-ENTMCNC: 32.2 G/DL (ref 31.5–36.5)
MCHC RBC AUTO-ENTMCNC: 33.2 G/DL (ref 31.5–36.5)
MCV RBC AUTO: 102 FL (ref 78–100)
MCV RBC AUTO: 104 FL (ref 78–100)
MICRO REPORT STATUS: NORMAL
NSE SERPL-MCNC: 23.1 UG/L
O2/TOTAL GAS SETTING VFR VENT: 40 %
O2/TOTAL GAS SETTING VFR VENT: ABNORMAL %
OXYHGB MFR BLD: 94 % (ref 92–100)
OXYHGB MFR BLD: 95 % (ref 92–100)
PCO2 BLD: 41 MM HG (ref 35–45)
PCO2 BLD: 44 MM HG (ref 35–45)
PH BLD: 7.45 PH (ref 7.35–7.45)
PH BLD: 7.5 PH (ref 7.35–7.45)
PHOSPHATE SERPL-MCNC: 3.1 MG/DL (ref 2.5–4.5)
PLATELET # BLD AUTO: 32 10E9/L (ref 150–450)
PLATELET # BLD AUTO: 39 10E9/L (ref 150–450)
PO2 BLD: 104 MM HG (ref 80–105)
PO2 BLD: 82 MM HG (ref 80–105)
POTASSIUM SERPL-SCNC: 3.6 MMOL/L (ref 3.4–5.3)
POTASSIUM SERPL-SCNC: 4 MMOL/L (ref 3.4–5.3)
RBC # BLD AUTO: 2.54 10E12/L (ref 3.8–5.2)
RBC # BLD AUTO: 2.54 10E12/L (ref 3.8–5.2)
SODIUM SERPL-SCNC: 148 MMOL/L (ref 133–144)
SODIUM SERPL-SCNC: 150 MMOL/L (ref 133–144)
SPECIMEN SOURCE: NORMAL
WBC # BLD AUTO: 3.2 10E9/L (ref 4–11)
WBC # BLD AUTO: 4.2 10E9/L (ref 4–11)

## 2017-06-02 PROCEDURE — 25000128 H RX IP 250 OP 636: Performed by: INTERNAL MEDICINE

## 2017-06-02 PROCEDURE — 25000128 H RX IP 250 OP 636: Performed by: NURSE PRACTITIONER

## 2017-06-02 PROCEDURE — 40000014 ZZH STATISTIC ARTERIAL MONITORING DAILY

## 2017-06-02 PROCEDURE — 25000125 ZZHC RX 250: Performed by: INTERNAL MEDICINE

## 2017-06-02 PROCEDURE — 40000196 ZZH STATISTIC RAPCV CVP MONITORING

## 2017-06-02 PROCEDURE — 99207 ZZC CDG-CORRECTLY CODED, REVIEWED AND AGREE: CPT | Performed by: CLINICAL NURSE SPECIALIST

## 2017-06-02 PROCEDURE — 82805 BLOOD GASES W/O2 SATURATION: CPT | Performed by: INTERNAL MEDICINE

## 2017-06-02 PROCEDURE — 95951 ZZHC EEG VIDEO EACH 24 HR: CPT | Mod: ZF

## 2017-06-02 PROCEDURE — 82330 ASSAY OF CALCIUM: CPT | Performed by: INTERNAL MEDICINE

## 2017-06-02 PROCEDURE — 86140 C-REACTIVE PROTEIN: CPT | Performed by: INTERNAL MEDICINE

## 2017-06-02 PROCEDURE — 40000275 ZZH STATISTIC RCP TIME EA 10 MIN

## 2017-06-02 PROCEDURE — 83735 ASSAY OF MAGNESIUM: CPT | Performed by: INTERNAL MEDICINE

## 2017-06-02 PROCEDURE — 99231 SBSQ HOSP IP/OBS SF/LOW 25: CPT | Performed by: CLINICAL NURSE SPECIALIST

## 2017-06-02 PROCEDURE — 93010 ELECTROCARDIOGRAM REPORT: CPT | Performed by: INTERNAL MEDICINE

## 2017-06-02 PROCEDURE — 85027 COMPLETE CBC AUTOMATED: CPT | Performed by: INTERNAL MEDICINE

## 2017-06-02 PROCEDURE — 84100 ASSAY OF PHOSPHORUS: CPT | Performed by: INTERNAL MEDICINE

## 2017-06-02 PROCEDURE — 25000132 ZZH RX MED GY IP 250 OP 250 PS 637: Performed by: NURSE PRACTITIONER

## 2017-06-02 PROCEDURE — 00000146 ZZHCL STATISTIC GLUCOSE BY METER IP

## 2017-06-02 PROCEDURE — 85610 PROTHROMBIN TIME: CPT | Performed by: INTERNAL MEDICINE

## 2017-06-02 PROCEDURE — 80048 BASIC METABOLIC PNL TOTAL CA: CPT | Performed by: INTERNAL MEDICINE

## 2017-06-02 PROCEDURE — 71010 XR CHEST PORT 1 VW: CPT

## 2017-06-02 PROCEDURE — 25000132 ZZH RX MED GY IP 250 OP 250 PS 637: Performed by: INTERNAL MEDICINE

## 2017-06-02 PROCEDURE — 93005 ELECTROCARDIOGRAM TRACING: CPT

## 2017-06-02 PROCEDURE — 83615 LACTATE (LD) (LDH) ENZYME: CPT | Performed by: INTERNAL MEDICINE

## 2017-06-02 PROCEDURE — 20000004 ZZH R&B ICU UMMC

## 2017-06-02 PROCEDURE — 94003 VENT MGMT INPAT SUBQ DAY: CPT

## 2017-06-02 PROCEDURE — 85384 FIBRINOGEN ACTIVITY: CPT | Performed by: INTERNAL MEDICINE

## 2017-06-02 PROCEDURE — 25000125 ZZHC RX 250: Performed by: STUDENT IN AN ORGANIZED HEALTH CARE EDUCATION/TRAINING PROGRAM

## 2017-06-02 RX ORDER — SODIUM CHLORIDE 9 MG/ML
INJECTION, SOLUTION INTRAVENOUS
Status: DISCONTINUED
Start: 2017-06-02 | End: 2017-06-05 | Stop reason: HOSPADM

## 2017-06-02 RX ADMIN — MINERAL OIL AND WHITE PETROLATUM: 150; 830 OINTMENT OPHTHALMIC at 06:05

## 2017-06-02 RX ADMIN — MINERAL OIL AND WHITE PETROLATUM: 150; 830 OINTMENT OPHTHALMIC at 22:33

## 2017-06-02 RX ADMIN — MEROPENEM 1 G: 1 INJECTION, POWDER, FOR SOLUTION INTRAVENOUS at 07:33

## 2017-06-02 RX ADMIN — DIGOXIN 125 MCG: 125 TABLET ORAL at 07:28

## 2017-06-02 RX ADMIN — GABAPENTIN 200 MG: 250 SOLUTION ORAL at 14:00

## 2017-06-02 RX ADMIN — HYDROCORTISONE SODIUM SUCCINATE 100 MG: 100 INJECTION, POWDER, FOR SOLUTION INTRAMUSCULAR; INTRAVENOUS at 03:47

## 2017-06-02 RX ADMIN — OYSTER SHELL CALCIUM WITH VITAMIN D 2 TABLET: 500; 200 TABLET, FILM COATED ORAL at 07:28

## 2017-06-02 RX ADMIN — Medication 50 MCG/HR: at 21:58

## 2017-06-02 RX ADMIN — FOLIC ACID 1 MG: 1 TABLET ORAL at 07:28

## 2017-06-02 RX ADMIN — MULTIVIT AND MINERALS-FERROUS GLUCONATE 9 MG IRON/15 ML ORAL LIQUID 15 ML: at 07:28

## 2017-06-02 RX ADMIN — HUMAN INSULIN 2 UNITS/HR: 100 INJECTION, SOLUTION SUBCUTANEOUS at 07:49

## 2017-06-02 RX ADMIN — HYDROCORTISONE SODIUM SUCCINATE 100 MG: 100 INJECTION, POWDER, FOR SOLUTION INTRAMUSCULAR; INTRAVENOUS at 11:14

## 2017-06-02 RX ADMIN — LEVETIRACETAM 1000 MG: 100 SOLUTION ORAL at 00:02

## 2017-06-02 RX ADMIN — MINERAL OIL AND WHITE PETROLATUM: 150; 830 OINTMENT OPHTHALMIC at 13:59

## 2017-06-02 RX ADMIN — SODIUM CHLORIDE 250 ML: 9 INJECTION, SOLUTION INTRAVENOUS at 16:35

## 2017-06-02 RX ADMIN — PANTOPRAZOLE SODIUM 40 MG: 40 INJECTION, POWDER, FOR SOLUTION INTRAVENOUS at 07:28

## 2017-06-02 RX ADMIN — HUMAN INSULIN 2 UNITS/HR: 100 INJECTION, SOLUTION SUBCUTANEOUS at 23:36

## 2017-06-02 RX ADMIN — POTASSIUM CHLORIDE 20 MEQ: 29.8 INJECTION, SOLUTION INTRAVENOUS at 04:30

## 2017-06-02 RX ADMIN — HYDROCORTISONE SODIUM SUCCINATE 75 MG: 100 INJECTION, POWDER, FOR SOLUTION INTRAMUSCULAR; INTRAVENOUS at 20:24

## 2017-06-02 RX ADMIN — LEVETIRACETAM 1000 MG: 100 SOLUTION ORAL at 20:24

## 2017-06-02 RX ADMIN — DEXTROSE MONOHYDRATE 1000 ML: 50 INJECTION, SOLUTION INTRAVENOUS at 06:04

## 2017-06-02 RX ADMIN — GABAPENTIN 200 MG: 250 SOLUTION ORAL at 07:32

## 2017-06-02 RX ADMIN — PHYTONADIONE 5 MG: 5 TABLET ORAL at 07:33

## 2017-06-02 RX ADMIN — LEVETIRACETAM 1000 MG: 100 SOLUTION ORAL at 07:32

## 2017-06-02 RX ADMIN — SILVER SULFADIAZINE: 10 CREAM TOPICAL at 07:59

## 2017-06-02 RX ADMIN — GABAPENTIN 200 MG: 250 SOLUTION ORAL at 20:24

## 2017-06-02 RX ADMIN — MEROPENEM 1 G: 1 INJECTION, POWDER, FOR SOLUTION INTRAVENOUS at 20:25

## 2017-06-02 NOTE — PROGRESS NOTES
Cardiology Progress Note    Overnight/subj:   - epitliform activity on EEG (no actual seizures) started keppra ppx  - c/f pacer not capturing yesterday afternoon but patient in fib with irregular HR   - procal thiago elevated continue abx     VS reviewed: Notable for temp: AF, HR 68-78, MAP 66-82, oxygenating on 100% on 16/320/8/40  Tele: No events, Afib   [place]: CVP 8    Wt: on admit 63.5, yday 73, today 73.4  I/O: 1.8 in / 1.1 out yday. Since  in / 352 out    Drips:   Fentanyl   Insulin gtt    PO Cardiac Meds: Digoxin 0.125mg qD   Other meds: Meropenem, keppra, gabapen, ppi, hydrocortisone 100mg q8h    Radiology Imaging  CXR  ETT 4.2 cm above ermelinda, RIJ TPM in RA, parenchyma unchanged     Cardiology Studies:  ECG: Afib  Labs: Reviewed in epic    Phys exam:  GEN: NAD  Pulm: course breath sounds   Cardiac: , distant irregular, no murmurs  Vascular: +1 HERNANDO and dopplerable  pulses  GI: soft, non distended    ASSESSMENT/PLAN:  Amelia Michel is a 56 year old with hx VSD s/p surgical repair (1969), RA, HTN and recently dx Afib/flutter/SVT who is presenting after a cardiac arrest. Initial rhythm at EMS arrival was shockble. ROSC obtained on the field. Initial EF rhythm was ventriclar escape. S/p Coronary angiogram with no angiographic evidence of disease.       Cause of arrest is unclear, ddx: sinus arrest 2/2 amio, amio toxicity, Aspiration PNA, VT/VF from prior surgical scar, inflammatory reaction      Overall patient much improved. Renal improving, patient waking up, and decrease o2 requirements. Ongoing problems include thrombocytopenia, aspiration pna and hypoxia.      Neuro  # Likely anoxic brain injury   # Sedation  -- s/p cooling, now rewarmed  -- fentanyl gtt  -- versed pushes    # Epilitiform activity  -- kepprea 1G BID      CV  # hx VSD s/p Repair  # Atrial Fib, flutter and SVT (with difficult to control rates)  # Sinus arrest, s/p RA TPM  # Cardiac Arrest  # Cardiogenic Shock, EF 40-45%  # RV Strain on  echo  -- d/c amiodorone, BB and diltiazem   -- started digoxin   -- TPM via RIJ  -- off  epi, vaso   -- CVP to assess volume status   -- holding AC   -- Patient will need AICD prior to dischage       Pulm  # Hypoxic respiratory failure, likely ARDS 2/2  Aspiration event vs rheum less likely cardiogenic  -- Lung protective ventilation, IBW- 45.5, currently TV- 320~ 7ml/kg  -- daily pressure support BID, once on minimal vent settings  -- Abx       ID   # ESBL UTI  # Aspiration PNA, proCal 6/1- 42    -- d/c vanc  -- Meropenem 1G q8h  -- Bcx, Ucx, Rcx, NTD  -- c diff negative   -- fever, wbc curve       Rheum   # RA   -- Hold meds for now   -- wean stress dose steroids  --cortisol 38      Renal   CELSA,   HyperNa  UA c/w atn   Improving   D5w bolus         GI  # Shock Liver   # Feeding   -- trend LFTs  -- no indication for viral serologies at this time  --advanced feeds NGT      # Fluid around pancreas, lipase negative           Heme  # Coagulopathy, likely shock liver  # Previously on apixaban   # Thrombocytopenia, of unclear cause  -- continue to monitor   -- s/p vit K   -- Blood smear> showed thrombocytopenia,       Endo   Hypoglycemia  continue to monitor   On d5w and insulin gtt      Ppx   PPI  No anticoagulation at this time           Rick Nguyễn MD   Cardiology Fellow   *[id number]        I have seen and examined the patient with the [facility] team. I agree with the assessment and plan of the note above.I have reviewed pertinent labs.     Sundar Wood MD  Interventional Cardiology  Pager: [phone]

## 2017-06-02 NOTE — PROGRESS NOTES
EEG CLINICAL NEUROPHYSIOLOGY PRELIMINARY REPORT    Video-EEG through 0600 today reviewed. Full report to follow. No normal background or sleep wake. However, EEG is generally continuous and there is some reactivity both spontaneously and to stimulation. Left posterior hemisphere epileptiform activity continues but is less persistent than yesterday. No electrographic seizures.    Study continues consistent with moderate to marked diffuse encephalopathy. It continues improved compared to first study in this series of studies. EEG reactivity is a positive prognostic indicator in this setting. Epileptiform activity indicates continuing seizure tendency but this appears less persistent following administration of levetiracetam. No seizures recorded.    Hi Higginbotham MD  Pager [phone]

## 2017-06-02 NOTE — PROCEDURES
EEG #:  -4      DATE OF STUDY:  06/02/2017      TYPE OF STUDY:  Inpatient video-EEG monitoring.      DURATION OF STUDY:  23 hours 51 minutes 16 seconds.      HISTORY:  Day 4 of video-EEG monitoring in patient Amelia Michel, 56-year-old, who underwent a cardiovascular arrest and underwent therapeutic hypothermia.   She has been warmed now for close to 48 hours.  Low-dose midazolam was discontinued during the study; however, she continues treatment with fentanyl 25-50 mcg per hour.  Finally, because of some of the results early in the study, levetiracetam load was administered twice during this recording.      TECHNICAL SUMMARY:  EEG was recorded from scalp electrodes placed according to the 10-20 international system.  Additional electrodes were utilized for artifact detection, referencing, and recording from additional cerebral regions.  Video was continuously recorded.  Video was reviewed for purposes of clinical correlation and to assist with EEG interpretation.      FINDINGS:  Continuous moderate amplitude delta.  Maximum amplitude of delta is typically in the range of 40-50 microvolts.  There are other periods in which more diffuse alpha activity is seen with some desynchronization.  Amplitude of alpha activity is generally in the 20-30 microvolt range.  These 2 different patterns wax and wane.  A posterior dominant rhythm or sleep spindles are not seen at any point.      Stimulation is delivered according to ICU protocol starting at around 09:30.  This appears to change a background with diffuse alpha into a background with waxing and waning irregular delta activity.  The patient does appear to respond some to stimulation with eye movements, etc., but is not able to follow any commands.      INTERICTAL ABNORMALITY:  Left posterior hemisphere sharp waves and spikes are seen intermittently starting shortly after study onset.  These either occur singly or in brief runs.  Evaluation with hat band montage  indicates O1 phase reversal in most cases indicating amplitude maximum at O1.  Sometimes amplitude maximum appears between T5 and O1.  With CZ referencing, field is confined largely to O1 and T5 with a little field in P3 and minimal at O2.  Video was reviewed during these and no unusual movements are seen.  Levetiracetam is administered, but the persistence of this activity does not appear to decrease any as the study continues.      ICTAL:  No electrographic seizures.      IMPRESSION:  Abnormal.  There is evidence of moderate to marked diffuse encephalopathy.  There is clear change in background and some reactivity.  EEG is continuous.  These are all improvements compared to EEG recorded during hypothermia.      A new development is the emergence of the left posterior hemisphere spikes and sharp waves indicating focal cortical irritability in this area.  In the ICU setting, focal epileptiform activity is associated with increased risk for electrographic or clinical seizures.  However, electrographic or clinical seizures were not seen in this study.         KRISTOPHER LUU MD             D: 2017 08:48   T: 2017 09:40   MT: ELVER      Name:     FIDELIA MIDDLETON   MRN:      [id number]        Account:        [id number]   :      1960           Procedure Date: 2017      Document: [id number]

## 2017-06-02 NOTE — PLAN OF CARE
Problem: Goal Outcome Summary  Goal: Goal Outcome Summary  Outcome: Improving  Neuro: Pt gradually waking up throughout shift, starting to follow commands at end of shift. Pupils 4 mm sluggish and equal. Fentanyl at 25 mcg/hr  CV: Pt in afibb throughout the day, pacer fails to capture, rate steadily increased throughout shift was in the 90's, more recently in the 120's up to 140, MAP stable in the 70's-80's. CVP 10  Resp: PEEP decreased to 5  RR 12 FiO2 40% overbreathing vent at a rate of 16-21.  LS diminished in the bases. Small amount of sputum with suctioning.  GI: Having slow continuous liquid stools, TF increased to GR of 40 with 30 ml flushes q2hrs.   : Urine output around 30 ml per hour for most of shift, started to decrease last 3 hours to 10-15/hr.  Skin: Dressing on LUE changed, blistered over site.  Afebrile.

## 2017-06-02 NOTE — PROGRESS NOTES
[facility], [place]   Palliative Care Daily Progress Note          Recommendations, Patient/Family Counseling & Coordination     No new recommendations today.    Discussion  Visited with patient's , Romeo. Talked about the progress that Amelia has made and he very much is encouraged that she woke up enough to respond some but has not seen that today. He is also understanding that there could be some more ups and downs along the way and our hope is that she continues to make more improvements and have less set backs in her recovery. We talk about expectations of waking up how that could take some time and we will be looking for how she continues to respond and interact to be able to come off of the ventilator. Romeo also shares that he was pleased to be able to get a hold of her records from her heart surgery many years ago.    He appreciates the visit and agrees to our teams involvement, will plan on visiting again early next week.    POLST -NA     Thank you for the opportunity to continue to participate in the care of this patient and family.  Please feel free to contact on-call palliative provider with any emergent needs.  We can be reached via team pager [phone] (answered 8-4:30 Monday-Friday); after-hours answering service ([phone])    ELIZABETH Fernandes CNS  Palliative Care Consult Team  Pager: [phone]    15 minutes spent with patient, with >50% counseling and in care coordination.           Assessment      1) Diagnoses & symptoms:        Cardiogenic shock  Hypoxic respiratory failure  S/P Cardiac arrest  Concern for anoxic brain injury  Afib RVR  ESBL UTI  Shock liver  VSD s/p repair in 1969     2)  Psychosocial/Spiritual Needs:   Ongoing:Will discuss case during palliative interdisciplinary team rounds and involve LICSW and  as needed.        Is new assessment/intervention required by palliative team?:  No         Interval History:   [place]  has made some improvements, waking up now, trial of CPAP, having decreased oxygen needs.           Review of Systems:   Palliative Symptom Review (0=no symptom/no concern, 1=mild, 2=moderate, 3=severe):      MARSHA given AMS            Medications:   I have reviewed this patient's medication profile and medications given in past 24 hours.           Physical Exam:   Vitals were reviewed  Temp: 97.7  F (36.5  C) Temp src: Axillary    Heart Rate: 125 Resp: 15 SpO2: 94 % O2 Device: Mechanical Ventilator    Constitutional: intubated and sedated  Lungs: No increased work of breathing  Abdomen: hypoactive bowel sounds, soft, non-distended  Neurologic: sedated, unable to communicate  Neuropsychiatric: MARSHA given AMS             Data Reviewed:   ROUTINE ICU LABS (Last four results)  CMP  Recent Labs  Lab 06/02/17  0342 06/01/17  1615 06/01/17  1007 06/01/17  0342  05/31/17  1552 05/31/17  1018  05/31/17  0357  05/30/17  2204  05/30/17  0347   * 146* 149* 147*  < > 148*  145* 148*  --  147*  --  148*  < > 145*   POTASSIUM 3.6 4.0 3.9 3.7  < > 4.3 4.3  --  4.4  --  4.3  < > 3.3*   CHLORIDE 112* 109 111* 109  < > 110* 108  --  107  --  107  < > 105   CO2 31 32 32 32  < > 34* 32  --  31  --  31  < > 21   ANIONGAP 6 5 6 7  < > 4 8  --  10  --  10  < > 20*   * 133* 123* 124*  < > 125*  123* 138*  < > 140*  < > 140*  135*  < > 148*  152*   BUN 56* 57* 56* 55*  < > 51* 46*  --  44*  --  40*  < > 31*   CR 1.36* 1.66* 1.71* 1.75*  < > 1.88* 1.85*  --  1.89*  --  1.78*  < > 1.62*   GFRESTIMATED 40* 32* 31* 30*  < > 28* 28*  --  27*  --  29*  < > 33*   GFRESTBLACK 49* 39* 37* 36*  < > 33* 34*  --  33*  --  36*  < > 40*   VIKTORIYA 8.0* 8.0* 8.1* 7.9*  < > 7.9* 7.6*  --  7.9*  --  8.0*  < > 8.1*   MAG 2.5*  --   --  2.5*  --  2.2 2.3  --  2.3  --  2.1  < > 2.2   PHOS 3.1  --   --  5.3*  --   --   --   --  6.5*  --   --   --  4.5   PROTTOTAL  --   --   --  4.2*  --   --  4.1*  --  4.4*  --  4.0*  < > 4.3*   ALBUMIN  --   --   --   1.9*  --   --  1.9*  --  1.7*  --  1.6*  < > 1.7*   BILITOTAL  --   --   --  2.6*  --   --  2.4*  --  2.1*  --  2.0*  < > 1.6*   ALKPHOS  --   --   --  125  --   --  129  --  157*  --  148  < > 135   AST  --   --   --  597*  --   --  765*  --  794*  --  725*  < > 862*   ALT  --   --   --  891*  --   --  939*  --  976*  --  873*  < > 920*   < > = values in this interval not displayed.  CBC  Recent Labs  Lab 06/02/17  0342 06/01/17  1615 06/01/17  1007 06/01/17  0342   WBC 4.2 4.7 5.1 5.9   RBC 2.54* 2.54* 2.44* 2.35*   HGB 8.6* 8.4* 8.2* 8.1*   HCT 25.9* 26.0* 24.9* 23.5*   * 102* 102* 100   MCH 33.9* 33.1* 33.6* 34.5*   MCHC 33.2 32.3 32.9 34.5   RDW 21.8* 22.1* 22.4* 22.3*   PLT 39* 49* 50* 43*     INR  Recent Labs  Lab 06/02/17  0342 06/01/17  1615 06/01/17  1007 06/01/17  0342   INR 1.38* 1.54* 1.66* 1.88*     Arterial Blood Gas  Recent Labs  Lab 06/02/17  0342 06/01/17  2159 06/01/17  1804 06/01/17  1615   PH 7.50* 7.52* 7.51* 7.50*   PCO2 41 39 39 41   PO2 82 86 87 79*   HCO3 32* 32* 31* 32*   O2PER 40 40 40 40

## 2017-06-02 NOTE — PLAN OF CARE
Problem: Goal Outcome Summary  Goal: Goal Outcome Summary  Outcome: No Change  A fib with atrial pacing, Dr. Yusuf aware that pt has ongoing failure to capture. Pt BP tolerating pacemaker issues so no intervention done. CVP was 6 with low UO, given 250 bolus of LR, afebrile. CMV 40%/16/320/8      Fentanyl gtt infusing, see MAR/doc flow sheet.  Huynh patent,  cc/hr. Tube feedings via NJ @ 30 cc/hr, will increase to 40 cc at 0800. OG to LIS.     Plan: Will continue to monitor/assess and update MD as needed

## 2017-06-02 NOTE — MR AVS SNAPSHOT
After Visit Summary   6/2/2017    Amelia Michel    MRN: [id number]           Patient Information     Date Of Birth          1960        Visit Information        Provider Department      6/2/2017 7:00 AM [facility] TECH 4 [facility]        Today's Diagnoses     Cardiogenic shock (H)    -  1       Follow-ups after your visit        Your next 10 appointments already scheduled     Jun 03, 2017  7:00 AM CDT   24 Hour Video Visit with [facility] EEG TECH 4   [facility] ([facility])    [facility]  [address] [address] [phone]           [place]: Your appointment is scheduled at [facility]. [address] [place] [id number]              Who to contact     Please call your clinic at [phone] to:    Ask questions about your health    Make or cancel appointments    Discuss your medicines    Learn about your test results    Speak to your doctor   If you have compliments or concerns about an experience at your clinic, or if you wish to file a complaint, please contact [facility] Patient Relations at [phone] or email us at [email or web address]         Additional Information About Your Visit        MyChart Information     [employer] gives you secure access to your electronic health record. If you see a primary care provider, you can also send messages to your care team and make appointments. If you have questions, please call your primary care clinic.  If you do not have a primary care provider, please call [phone] and they will assist you.      [facility] is an electronic gateway that provides easy, online access to your medical records. With [facility], you can request a clinic appointment, read your test results, renew a prescription or communicate with your care team.     To access your existing account, please contact your [facility] or  call [phone] for assistance.        Care EveryWhere ID     This is your Care EveryWhere ID. This could be used by other organizations to access your [facility] medical records  [id number]         Blood Pressure from Last 3 Encounters:   06/01/17 109/65   05/23/17 125/53   04/04/17 104/78    Weight from Last 3 Encounters:   06/02/17 73.4 kg (161 lb 13.1 oz)   05/23/17 64.4 kg (141 lb 15.6 oz)   04/04/17 61.6 kg (135 lb 12.8 oz)              We Performed the Following     EEG     Glucose by meter     Glucose by meter     Glucose by meter          Today's Medication Changes      Notice     This visit is during an admission. Changes to the med list made in this visit will be reflected in the After Visit Summary of the admission.             Primary Care Provider Office Phone # Fax #    Comfort Sabillon [phone]       [facility] [address] [facility] [address]        Thank you!     Thank you for choosing [facility]  for your care. Our goal is always to provide you with excellent care. Hearing back from our patients is one way we can continue to improve our services. Please take a few minutes to complete the written survey that you may receive in the mail after your visit with us. Thank you!             Your Updated Medication List - Protect others around you: Learn how to safely use, store and throw away your medicines at www.disposemymeds.org.      Notice     This visit is during an admission. Changes to the med list made in this visit will be reflected in the After Visit Summary of the admission.

## 2017-06-02 NOTE — PROVIDER NOTIFICATION
1600 Provider notified about heart rate steadily increasing throughout the day. Currently 120's all the way up to 145 in A-fibb, orders received to get an EKG to confirm rhythm, and to give  250 ml bolus of NS. EKG results showed afibb with RVR.

## 2017-06-03 ENCOUNTER — APPOINTMENT (OUTPATIENT)
Dept: GENERAL RADIOLOGY | Facility: CLINIC | Age: 57
DRG: 260 | End: 2017-06-03
Attending: INTERNAL MEDICINE
Payer: COMMERCIAL

## 2017-06-03 ENCOUNTER — ALLIED HEALTH/NURSE VISIT (OUTPATIENT)
Dept: NEUROLOGY | Facility: CLINIC | Age: 57
DRG: 260 | End: 2017-06-03
Attending: PSYCHIATRY & NEUROLOGY
Payer: COMMERCIAL

## 2017-06-03 DIAGNOSIS — R57.0 CARDIOGENIC SHOCK (H): Primary | ICD-10-CM

## 2017-06-03 LAB
17OHP SERPL-MCNC: ABNORMAL NG/DL
ANDROST SERPL-MCNC: 23 NG/ML
ANION GAP SERPL CALCULATED.3IONS-SCNC: 5 MMOL/L (ref 3–14)
ANION GAP SERPL CALCULATED.3IONS-SCNC: 5 MMOL/L (ref 3–14)
BASE EXCESS BLDA CALC-SCNC: 3.3 MMOL/L
BASE EXCESS BLDA CALC-SCNC: 5.7 MMOL/L
BLD PROD TYP BPU: NORMAL
BLD PROD TYP BPU: NORMAL
BLD UNIT ID BPU: 0
BLD UNIT ID BPU: 0
BLOOD PRODUCT CODE: NORMAL
BLOOD PRODUCT CODE: NORMAL
BPU ID: NORMAL
BPU ID: NORMAL
BUN SERPL-MCNC: 44 MG/DL (ref 7–30)
BUN SERPL-MCNC: 49 MG/DL (ref 7–30)
CA-I BLD-MCNC: 4.7 MG/DL (ref 4.4–5.2)
CALCIUM SERPL-MCNC: 7.8 MG/DL (ref 8.5–10.1)
CALCIUM SERPL-MCNC: 7.8 MG/DL (ref 8.5–10.1)
CHLORIDE SERPL-SCNC: 112 MMOL/L (ref 94–109)
CHLORIDE SERPL-SCNC: 113 MMOL/L (ref 94–109)
CO2 SERPL-SCNC: 30 MMOL/L (ref 20–32)
CO2 SERPL-SCNC: 31 MMOL/L (ref 20–32)
CORTIS AM PEAK SERPL-MCNC: 68 UG/DL
CREAT SERPL-MCNC: 0.94 MG/DL (ref 0.52–1.04)
CREAT SERPL-MCNC: 1.15 MG/DL (ref 0.52–1.04)
CRP SERPL-MCNC: 29 MG/L (ref 0–8)
ERYTHROCYTE [DISTWIDTH] IN BLOOD BY AUTOMATED COUNT: 20.3 % (ref 10–15)
ERYTHROCYTE [DISTWIDTH] IN BLOOD BY AUTOMATED COUNT: 20.8 % (ref 10–15)
FIBRINOGEN PPP-MCNC: 251 MG/DL (ref 200–420)
GFR SERPL CREATININE-BSD FRML MDRD: 49 ML/MIN/1.7M2
GFR SERPL CREATININE-BSD FRML MDRD: 62 ML/MIN/1.7M2
GLUCOSE BLDC GLUCOMTR-MCNC: 112 MG/DL (ref 70–99)
GLUCOSE BLDC GLUCOMTR-MCNC: 116 MG/DL (ref 70–99)
GLUCOSE BLDC GLUCOMTR-MCNC: 116 MG/DL (ref 70–99)
GLUCOSE BLDC GLUCOMTR-MCNC: 120 MG/DL (ref 70–99)
GLUCOSE BLDC GLUCOMTR-MCNC: 124 MG/DL (ref 70–99)
GLUCOSE BLDC GLUCOMTR-MCNC: 128 MG/DL (ref 70–99)
GLUCOSE BLDC GLUCOMTR-MCNC: 135 MG/DL (ref 70–99)
GLUCOSE BLDC GLUCOMTR-MCNC: 135 MG/DL (ref 70–99)
GLUCOSE BLDC GLUCOMTR-MCNC: 152 MG/DL (ref 70–99)
GLUCOSE BLDC GLUCOMTR-MCNC: 154 MG/DL (ref 70–99)
GLUCOSE BLDC GLUCOMTR-MCNC: 158 MG/DL (ref 70–99)
GLUCOSE BLDC GLUCOMTR-MCNC: 158 MG/DL (ref 70–99)
GLUCOSE BLDC GLUCOMTR-MCNC: 160 MG/DL (ref 70–99)
GLUCOSE BLDC GLUCOMTR-MCNC: 163 MG/DL (ref 70–99)
GLUCOSE BLDC GLUCOMTR-MCNC: 168 MG/DL (ref 70–99)
GLUCOSE SERPL-MCNC: 128 MG/DL (ref 70–99)
GLUCOSE SERPL-MCNC: 160 MG/DL (ref 70–99)
HCO3 BLD-SCNC: 29 MMOL/L (ref 21–28)
HCO3 BLD-SCNC: 31 MMOL/L (ref 21–28)
HCT VFR BLD AUTO: 25.7 % (ref 35–47)
HCT VFR BLD AUTO: 26 % (ref 35–47)
HGB BLD-MCNC: 8.5 G/DL (ref 11.7–15.7)
HGB BLD-MCNC: 8.6 G/DL (ref 11.7–15.7)
INR PPP: 1.27 (ref 0.86–1.14)
INR PPP: 1.29 (ref 0.86–1.14)
LDH SERPL L TO P-CCNC: 383 U/L (ref 81–234)
MAGNESIUM SERPL-MCNC: 2.2 MG/DL (ref 1.6–2.3)
MCH RBC QN AUTO: 34 PG (ref 26.5–33)
MCH RBC QN AUTO: 34.1 PG (ref 26.5–33)
MCHC RBC AUTO-ENTMCNC: 33.1 G/DL (ref 31.5–36.5)
MCHC RBC AUTO-ENTMCNC: 33.1 G/DL (ref 31.5–36.5)
MCV RBC AUTO: 103 FL (ref 78–100)
MCV RBC AUTO: 103 FL (ref 78–100)
O2/TOTAL GAS SETTING VFR VENT: 40 %
O2/TOTAL GAS SETTING VFR VENT: 40 %
OXYHGB MFR BLD: 93 % (ref 92–100)
OXYHGB MFR BLD: 95 % (ref 92–100)
PCO2 BLD: 50 MM HG (ref 35–45)
PCO2 BLD: 53 MM HG (ref 35–45)
PH BLD: 7.35 PH (ref 7.35–7.45)
PH BLD: 7.4 PH (ref 7.35–7.45)
PHOSPHATE SERPL-MCNC: 2.3 MG/DL (ref 2.5–4.5)
PLATELET # BLD AUTO: 29 10E9/L (ref 150–450)
PLATELET # BLD AUTO: 29 10E9/L (ref 150–450)
PO2 BLD: 85 MM HG (ref 80–105)
PO2 BLD: 93 MM HG (ref 80–105)
POTASSIUM SERPL-SCNC: 4 MMOL/L (ref 3.4–5.3)
POTASSIUM SERPL-SCNC: 4 MMOL/L (ref 3.4–5.3)
RBC # BLD AUTO: 2.49 10E12/L (ref 3.8–5.2)
RBC # BLD AUTO: 2.53 10E12/L (ref 3.8–5.2)
SODIUM SERPL-SCNC: 147 MMOL/L (ref 133–144)
SODIUM SERPL-SCNC: 148 MMOL/L (ref 133–144)
TRANSFUSION STATUS PATIENT QL: NORMAL
WBC # BLD AUTO: 3.3 10E9/L (ref 4–11)
WBC # BLD AUTO: 4.2 10E9/L (ref 4–11)

## 2017-06-03 PROCEDURE — 94003 VENT MGMT INPAT SUBQ DAY: CPT

## 2017-06-03 PROCEDURE — 85610 PROTHROMBIN TIME: CPT | Performed by: INTERNAL MEDICINE

## 2017-06-03 PROCEDURE — 93005 ELECTROCARDIOGRAM TRACING: CPT

## 2017-06-03 PROCEDURE — 82805 BLOOD GASES W/O2 SATURATION: CPT | Performed by: INTERNAL MEDICINE

## 2017-06-03 PROCEDURE — 25000128 H RX IP 250 OP 636: Performed by: INTERNAL MEDICINE

## 2017-06-03 PROCEDURE — 93010 ELECTROCARDIOGRAM REPORT: CPT | Performed by: INTERNAL MEDICINE

## 2017-06-03 PROCEDURE — 00000146 ZZHCL STATISTIC GLUCOSE BY METER IP

## 2017-06-03 PROCEDURE — 85027 COMPLETE CBC AUTOMATED: CPT | Performed by: INTERNAL MEDICINE

## 2017-06-03 PROCEDURE — 84100 ASSAY OF PHOSPHORUS: CPT | Performed by: INTERNAL MEDICINE

## 2017-06-03 PROCEDURE — 25000125 ZZHC RX 250: Performed by: INTERNAL MEDICINE

## 2017-06-03 PROCEDURE — 40000014 ZZH STATISTIC ARTERIAL MONITORING DAILY

## 2017-06-03 PROCEDURE — 27210437 ZZH NUTRITION PRODUCT SEMIELEM INTERMED LITER

## 2017-06-03 PROCEDURE — 71010 XR CHEST PORT 1 VW: CPT

## 2017-06-03 PROCEDURE — 20000004 ZZH R&B ICU UMMC

## 2017-06-03 PROCEDURE — 25000125 ZZHC RX 250: Performed by: STUDENT IN AN ORGANIZED HEALTH CARE EDUCATION/TRAINING PROGRAM

## 2017-06-03 PROCEDURE — 25000128 H RX IP 250 OP 636

## 2017-06-03 PROCEDURE — 95951 ZZHC EEG VIDEO EACH 24 HR: CPT | Mod: ZF

## 2017-06-03 PROCEDURE — 25000132 ZZH RX MED GY IP 250 OP 250 PS 637: Performed by: INTERNAL MEDICINE

## 2017-06-03 PROCEDURE — 25000132 ZZH RX MED GY IP 250 OP 250 PS 637: Performed by: NURSE PRACTITIONER

## 2017-06-03 PROCEDURE — 40000196 ZZH STATISTIC RAPCV CVP MONITORING

## 2017-06-03 PROCEDURE — 82330 ASSAY OF CALCIUM: CPT | Performed by: INTERNAL MEDICINE

## 2017-06-03 PROCEDURE — 83735 ASSAY OF MAGNESIUM: CPT | Performed by: INTERNAL MEDICINE

## 2017-06-03 PROCEDURE — P9037 PLATE PHERES LEUKOREDU IRRAD: HCPCS

## 2017-06-03 PROCEDURE — 99233 SBSQ HOSP IP/OBS HIGH 50: CPT | Mod: GC | Performed by: INTERNAL MEDICINE

## 2017-06-03 PROCEDURE — 86140 C-REACTIVE PROTEIN: CPT | Performed by: INTERNAL MEDICINE

## 2017-06-03 PROCEDURE — 83615 LACTATE (LD) (LDH) ENZYME: CPT | Performed by: INTERNAL MEDICINE

## 2017-06-03 PROCEDURE — 40000275 ZZH STATISTIC RCP TIME EA 10 MIN

## 2017-06-03 PROCEDURE — 80048 BASIC METABOLIC PNL TOTAL CA: CPT | Performed by: INTERNAL MEDICINE

## 2017-06-03 PROCEDURE — 85384 FIBRINOGEN ACTIVITY: CPT | Performed by: INTERNAL MEDICINE

## 2017-06-03 RX ORDER — MEROPENEM 1 G/1
1 INJECTION, POWDER, FOR SOLUTION INTRAVENOUS EVERY 8 HOURS
Status: DISCONTINUED | OUTPATIENT
Start: 2017-06-03 | End: 2017-06-03

## 2017-06-03 RX ORDER — ERTAPENEM 1 G/1
1 INJECTION, POWDER, LYOPHILIZED, FOR SOLUTION INTRAMUSCULAR; INTRAVENOUS EVERY 24 HOURS
Status: COMPLETED | OUTPATIENT
Start: 2017-06-03 | End: 2017-06-04

## 2017-06-03 RX ADMIN — PANTOPRAZOLE SODIUM 40 MG: 40 INJECTION, POWDER, FOR SOLUTION INTRAVENOUS at 08:21

## 2017-06-03 RX ADMIN — HYDROCORTISONE SODIUM SUCCINATE 50 MG: 100 INJECTION, POWDER, FOR SOLUTION INTRAMUSCULAR; INTRAVENOUS at 19:58

## 2017-06-03 RX ADMIN — ERTAPENEM SODIUM 1 G: 1 INJECTION, POWDER, LYOPHILIZED, FOR SOLUTION INTRAMUSCULAR; INTRAVENOUS at 18:04

## 2017-06-03 RX ADMIN — HYDROCORTISONE SODIUM SUCCINATE 75 MG: 100 INJECTION, POWDER, FOR SOLUTION INTRAMUSCULAR; INTRAVENOUS at 03:36

## 2017-06-03 RX ADMIN — MINERAL OIL AND WHITE PETROLATUM: 150; 830 OINTMENT OPHTHALMIC at 22:22

## 2017-06-03 RX ADMIN — DIGOXIN 125 MCG: 125 TABLET ORAL at 08:21

## 2017-06-03 RX ADMIN — HYDROCORTISONE SODIUM SUCCINATE 75 MG: 100 INJECTION, POWDER, FOR SOLUTION INTRAMUSCULAR; INTRAVENOUS at 11:46

## 2017-06-03 RX ADMIN — MEROPENEM 1 G: 1 INJECTION, POWDER, FOR SOLUTION INTRAVENOUS at 16:06

## 2017-06-03 RX ADMIN — SODIUM PHOSPHATE, MONOBASIC, MONOHYDRATE AND SODIUM PHOSPHATE, DIBASIC, ANHYDROUS 15 MMOL: 276; 142 INJECTION, SOLUTION INTRAVENOUS at 04:21

## 2017-06-03 RX ADMIN — MINERAL OIL AND WHITE PETROLATUM: 150; 830 OINTMENT OPHTHALMIC at 13:41

## 2017-06-03 RX ADMIN — MULTIVIT AND MINERALS-FERROUS GLUCONATE 9 MG IRON/15 ML ORAL LIQUID 15 ML: at 08:21

## 2017-06-03 RX ADMIN — HUMAN INSULIN 3 UNITS/HR: 100 INJECTION, SOLUTION SUBCUTANEOUS at 16:29

## 2017-06-03 RX ADMIN — GABAPENTIN 200 MG: 250 SOLUTION ORAL at 08:23

## 2017-06-03 RX ADMIN — MEROPENEM 1 G: 1 INJECTION, POWDER, FOR SOLUTION INTRAVENOUS at 08:23

## 2017-06-03 RX ADMIN — FOLIC ACID 1 MG: 1 TABLET ORAL at 08:21

## 2017-06-03 RX ADMIN — GABAPENTIN 200 MG: 250 SOLUTION ORAL at 13:41

## 2017-06-03 RX ADMIN — SILVER SULFADIAZINE: 10 CREAM TOPICAL at 08:22

## 2017-06-03 RX ADMIN — OYSTER SHELL CALCIUM WITH VITAMIN D 2 TABLET: 500; 200 TABLET, FILM COATED ORAL at 08:21

## 2017-06-03 RX ADMIN — LEVETIRACETAM 1000 MG: 100 SOLUTION ORAL at 08:22

## 2017-06-03 RX ADMIN — Medication 75 MCG/HR: at 16:29

## 2017-06-03 RX ADMIN — HUMAN INSULIN 4 UNITS/HR: 100 INJECTION, SOLUTION SUBCUTANEOUS at 08:35

## 2017-06-03 RX ADMIN — LEVETIRACETAM 1000 MG: 100 SOLUTION ORAL at 19:58

## 2017-06-03 RX ADMIN — GABAPENTIN 200 MG: 250 SOLUTION ORAL at 19:58

## 2017-06-03 NOTE — PROGRESS NOTES
Patient's vitals remained stable throughout shift. Atrial fibrillation/transvenous paced @ 74 at times. -130s/50-60s, MAP>65, HR , RR 14-18, CVP 8-10, SpO2 88-95, desaturates with turns. Follows commands; pupils equal and reactive but sluggish. TF @ 40 (goal) with 60 cc water flush q 4 hours. 250 cc bolus NS given. Bladder scan showed 129 cc urine in bladder. Replaced brand after noticing urine leakage. Brand is positional with urine 5 cc/hr-60 cc/hr (mostly 20-25 cc/hr).  Patient having loose/liquid brown stool q 2 hours. D5 turned off at 0545 per order. Electrolytes replaced per protocol.   Vent settings: VC/AC, RR 12, Peep 5, , FiO2 40%.  Insulin @ 4 units/hr, Fentanyl @ 75 mcg/hr. Plan is to try to pressure support patient today. Will continue to monitor patient closely and update MDs as needed.

## 2017-06-03 NOTE — PROGRESS NOTES
EEG CLINICAL NEUROPHYSIOLOGY PRELIMINARY REPORT    Video-EEG through 0600 today reviewed; fulll report to follow. Continues with evidence of moderate, sometimes moderate to severe diffuse encephalopathy. Clear changes in background when when more active tho EEG does not really change when she undergoes the formal stimulation protocol. No posterior dominant rhythm. No normal sleep. Left posterior hemisphere sharp waves continue but no ongoing seizures.    EEG clearly improved following lifting of hyphtermia but still with at least moderate diffuse encephalopathy. Tho continuing evidence of left posterior hemisphere focal cortical irritability there are no ongoing seizurs.    Hi Higginbotham MD  Pager [phone]

## 2017-06-03 NOTE — PROGRESS NOTES
Cardiology Progress Note    Overnight/subj:   - epitliform activity on EEG (no actual seizures) on keppra ppx (detailed EEG report documented below)  - pressure supported for 2 hours this AM (PEEP 5: PS 7, FiO2 40%, RR 15-19, Tv ~450). Towards the end of the pressure support trial became tachycardiac, ABG showed hypercarbia - 53.  - On tele review alternating between  A-paced or AFib, when in AFib rates 90s-100s (up to 130s when being moved or suctioned).    VS reviewed: Notable for temp: AF, HR 68-78, MAP 70s-90s  [place]: CVP 8        Intake/Output Summary (Last 24 hours) at 06/03/17 1318  Last data filed at 06/03/17 1200   Gross per 24 hour   Intake             3443 ml   Output             1419 ml   Net             2024 ml       Drips:   Fentanyl   Insulin gtt    PO Cardiac Meds: Digoxin 0.125mg qD   Other meds: Meropenem (5/30 - *), keppra, gabapen, ppi, hydrocortisone 75 mg q8h    Radiology Imaging  CXR  ETT 2.5 cm above ermelinda, RIJ TPM in RA, parenchyma unchanged     Cardiology Studies:  ECG: Afib  Labs: Reviewed in epic    EEG - 6/2/17  IMPRESSION:  Abnormal.  There is evidence of moderate to marked diffuse encephalopathy.  There is clear change in background and some reactivity.  EEG is continuous.  These are all improvements compared to EEG recorded during hypothermia.       A new development is the emergence of the left posterior hemisphere spikes and sharp waves indicating focal cortical irritability in this area.  In the ICU setting, focal epileptiform activity is associated with increased risk for electrographic or clinical seizures.  However, electrographic or clinical seizures were not seen in this study.       Phys exam:  GEN: NAD, able to follow commands this AM  Pulm: coarse breath sounds   Cardiac: distant irregular, no murmurs  Vascular: +1 HERNANDO and dopplerable  pulses  GI: soft, non distended    ASSESSMENT/PLAN:  Amelia Michel is a 56 year old with hx VSD s/p surgical repair (1969), RA, HTN and  recently dx Afib/flutter/SVT who is presenting after a cardiac arrest. Initial rhythm at EMS arrival was shockble. ROSC obtained on the field. Initial EF rhythm was ventriclar escape. S/p Coronary angiogram with no angiographic evidence of disease.   Cause of arrest is unclear, ddx: sinus arrest 2/2 amio, amio toxicity, Aspiration PNA, VT/VF from prior surgical scar, inflammatory reaction    Overall patient much improved, waking up, and decrease o2 requirements. Ongoing problems include thrombocytopenia, aspiration pna and AFib.      Neuro  # Likely anoxic brain injury   # Sedation  -- s/p cooling, now rewarmed  -- fentanyl gtt  -- versed pushes    # Epilitiform activity  -- continue kepprea 1G BID  -- continue tele monitoring      CV  # hx VSD s/p Repair  # Atrial Fib, flutter and SVT (with difficult to control rates)  # Sinus arrest, s/p RA TPM  # Cardiac Arrest  # Cardiogenic Shock, EF 40-45%  # RV Strain on echo  -- d/c amiodorone, BB and diltiazem   -- continue digoxin 0.125 mg daily  -- TPM via RIJ  -- off epi, vaso   -- CVP to assess volume status   -- holding AC   -- Patient will need AICD prior to dischage   -- Will remove LFV, RFV and RFA lines today, will place RRA line      Pulm  # Hypoxic respiratory failure, likely ARDS 2/2  Aspiration event vs rheum less likely cardiogenic  -- Lung protective ventilation, IBW- 45.5, currently TV- 320~ 7ml/kg  -- daily pressure support BID  -- Potentially can be extubated tomorrow (6/4)  -- Meropenem for seven days (started 5/30)      ID   # ESBL UTI  # Aspiration PNA, proCal 6/1- 42    -- d/c vanc  -- Meropenem 1G q8h (started 5/30)  -- Bcx, Ucx, Rcx, NTD  -- c diff negative   -- monitor fever, wbc curve       Rheum   # RA   -- Hold meds for now   -- wean stress dose steroids  -- cortisol 38      Renal   CELSA; improved  HyperNa  UA c/w atn   Improving   D5w bolus         GI  # Shock Liver   # Feeding   -- trend LFTs  -- no indication for viral serologies at this  time  --advanced feeds NGT      # Fluid around pancreas, lipase negative           Heme  # Coagulopathy, likely shock liver  # Previously on apixaban   # Thrombocytopenia, of unclear cause  -- continue to monitor   -- s/p vit K   -- Blood smear> showed thrombocytopenia,       Endo   Hypoglycemia  continue to monitor   On d5w and insulin gtt      Ppx   PPI  No anticoagulation at this time           [place] MD  Cardiovascular Disease Fellow  Pager: [phone]            Staff Cardiologist: I evaluated the patient on 6/3/17 with the cardiology fellow.  Mrs. Michel has had a remarkable recovery from cardiac arrest complicated by respiratory failure / aspiration pneumonica, ischemic liver injury with secondary coagulopathy, CELSA, and electrolyte imbalance.  She has underlying AF and has atrial pacer in place but it does not appear to be capturing; her ventricular rate is in  range.  I have asked EP for input regarding possible AF ablation and potential dual chamber pacemaker.  We still do not have a good explanation for why she had possible sinus arrest but her intolerance to beta blockers suggests underlying myopathic process (albeit not evident on cardiac MRI).  Her respiratory mechanics are improving but I would wait 24 hours before extubation.  Remove the groin arterial and venous lines today.  Continue insulin drip.  I spoke to patient's family and updated them on the patient's progress.    Dilan Angela MD

## 2017-06-03 NOTE — PLAN OF CARE
Problem: Individualization  Goal: Patient Preferences  Outcome: Improving  Pt 100% paced this am then was paced with bigeminal PVCS. Pt more awake today, follows commands. Continued fentanyl and insulin gtts. DC thermoguard. Awaiting MD to place radial a line then will DC femoral shelia and venous cath.  in and updated by RN and MD. Pt pressure supported for about 2 hours then heart rate went from paced at 73 to afib RVR and pt returned to vent settings. Pt denies pain. UOP about 40cc/h  Ailyn Fernandez 6/3/2017

## 2017-06-03 NOTE — MR AVS SNAPSHOT
After Visit Summary   6/3/2017    Amelia Michel    MRN: [id number]           Patient Information     Date Of Birth          1960        Visit Information        Provider Department      6/3/2017 7:00 AM [facility] EEG TECH 4 [facility] EEG        Today's Diagnoses     Cardiogenic shock (H)    -  1       Follow-ups after your visit        Who to contact     Please call your clinic at [phone] to:    Ask questions about your health    Make or cancel appointments    Discuss your medicines    Learn about your test results    Speak to your doctor   If you have compliments or concerns about an experience at your clinic, or if you wish to file a complaint, please contact [facility] Patient Relations at [phone] or email us at [email or web address]         Additional Information About Your Visit        MyChart Information     [employer] gives you secure access to your electronic health record. If you see a primary care provider, you can also send messages to your care team and make appointments. If you have questions, please call your primary care clinic.  If you do not have a primary care provider, please call [phone] and they will assist you.      [email or web address] is an electronic gateway that provides easy, online access to your medical records. With [email or web address], you can request a clinic appointment, read your test results, renew a prescription or communicate with your care team.     To access your existing account, please contact your [facility] or call [phone] for assistance.        Care EveryWhere ID     This is your Care EveryWhere ID. This could be used by other organizations to access your [facility] medical records  [id number]         Blood Pressure from Last 3 Encounters:   06/03/17 129/66   05/23/17 125/53   04/04/17 104/78    Weight from Last 3 Encounters:   06/03/17 75.4 kg (166 lb 3.6 oz)   05/23/17 64.4 kg (141 lb 15.6 oz)    04/04/17 61.6 kg (135 lb 12.8 oz)              Today, you had the following     No orders found for display         Today's Medication Changes      Notice     This visit is during an admission. Changes to the med list made in this visit will be reflected in the After Visit Summary of the admission.             Primary Care Provider Office Phone # Fax #    Comfort Sabillon [phone]       [facility] [id number] [facility] [id number]        Thank you!     Thank you for choosing [facility]  for your care. Our goal is always to provide you with excellent care. Hearing back from our patients is one way we can continue to improve our services. Please take a few minutes to complete the written survey that you may receive in the mail after your visit with us. Thank you!             Your Updated Medication List - Protect others around you: Learn how to safely use, store and throw away your medicines at www.disposemymeds.org.      Notice     This visit is during an admission. Changes to the med list made in this visit will be reflected in the After Visit Summary of the admission.

## 2017-06-04 ENCOUNTER — APPOINTMENT (OUTPATIENT)
Dept: GENERAL RADIOLOGY | Facility: CLINIC | Age: 57
DRG: 260 | End: 2017-06-04
Attending: INTERNAL MEDICINE
Payer: COMMERCIAL

## 2017-06-04 ENCOUNTER — ALLIED HEALTH/NURSE VISIT (OUTPATIENT)
Dept: NEUROLOGY | Facility: CLINIC | Age: 57
DRG: 260 | End: 2017-06-04
Attending: PSYCHIATRY & NEUROLOGY
Payer: COMMERCIAL

## 2017-06-04 DIAGNOSIS — I47.19 ATRIAL TACHYCARDIA (H): Primary | ICD-10-CM

## 2017-06-04 LAB
ANION GAP SERPL CALCULATED.3IONS-SCNC: 5 MMOL/L (ref 3–14)
ANION GAP SERPL CALCULATED.3IONS-SCNC: 6 MMOL/L (ref 3–14)
BACTERIA SPEC CULT: NO GROWTH
BASE EXCESS BLDA CALC-SCNC: 5.5 MMOL/L
BASE EXCESS BLDA CALC-SCNC: 6.9 MMOL/L
BLD PROD TYP BPU: NORMAL
BUN SERPL-MCNC: 37 MG/DL (ref 7–30)
BUN SERPL-MCNC: 41 MG/DL (ref 7–30)
CA-I BLD-MCNC: 4.6 MG/DL (ref 4.4–5.2)
CALCIUM SERPL-MCNC: 7.7 MG/DL (ref 8.5–10.1)
CALCIUM SERPL-MCNC: 7.8 MG/DL (ref 8.5–10.1)
CHLORIDE SERPL-SCNC: 109 MMOL/L (ref 94–109)
CHLORIDE SERPL-SCNC: 113 MMOL/L (ref 94–109)
CO2 SERPL-SCNC: 30 MMOL/L (ref 20–32)
CO2 SERPL-SCNC: 35 MMOL/L (ref 20–32)
CREAT SERPL-MCNC: 0.77 MG/DL (ref 0.52–1.04)
CREAT SERPL-MCNC: 0.79 MG/DL (ref 0.52–1.04)
CRP SERPL-MCNC: 23 MG/L (ref 0–8)
ERYTHROCYTE [DISTWIDTH] IN BLOOD BY AUTOMATED COUNT: 19.6 % (ref 10–15)
ERYTHROCYTE [DISTWIDTH] IN BLOOD BY AUTOMATED COUNT: 19.6 % (ref 10–15)
FIBRINOGEN PPP-MCNC: 235 MG/DL (ref 200–420)
GFR SERPL CREATININE-BSD FRML MDRD: 75 ML/MIN/1.7M2
GFR SERPL CREATININE-BSD FRML MDRD: 78 ML/MIN/1.7M2
GLUCOSE BLDC GLUCOMTR-MCNC: 108 MG/DL (ref 70–99)
GLUCOSE BLDC GLUCOMTR-MCNC: 110 MG/DL (ref 70–99)
GLUCOSE BLDC GLUCOMTR-MCNC: 115 MG/DL (ref 70–99)
GLUCOSE BLDC GLUCOMTR-MCNC: 120 MG/DL (ref 70–99)
GLUCOSE BLDC GLUCOMTR-MCNC: 129 MG/DL (ref 70–99)
GLUCOSE BLDC GLUCOMTR-MCNC: 129 MG/DL (ref 70–99)
GLUCOSE BLDC GLUCOMTR-MCNC: 130 MG/DL (ref 70–99)
GLUCOSE BLDC GLUCOMTR-MCNC: 131 MG/DL (ref 70–99)
GLUCOSE BLDC GLUCOMTR-MCNC: 139 MG/DL (ref 70–99)
GLUCOSE BLDC GLUCOMTR-MCNC: 147 MG/DL (ref 70–99)
GLUCOSE BLDC GLUCOMTR-MCNC: 150 MG/DL (ref 70–99)
GLUCOSE BLDC GLUCOMTR-MCNC: 156 MG/DL (ref 70–99)
GLUCOSE BLDC GLUCOMTR-MCNC: 157 MG/DL (ref 70–99)
GLUCOSE BLDC GLUCOMTR-MCNC: 159 MG/DL (ref 70–99)
GLUCOSE BLDC GLUCOMTR-MCNC: 160 MG/DL (ref 70–99)
GLUCOSE BLDC GLUCOMTR-MCNC: 168 MG/DL (ref 70–99)
GLUCOSE SERPL-MCNC: 133 MG/DL (ref 70–99)
GLUCOSE SERPL-MCNC: 140 MG/DL (ref 70–99)
HCO3 BLD-SCNC: 30 MMOL/L (ref 21–28)
HCO3 BLD-SCNC: 31 MMOL/L (ref 21–28)
HCT VFR BLD AUTO: 25.4 % (ref 35–47)
HCT VFR BLD AUTO: 29.8 % (ref 35–47)
HGB BLD-MCNC: 8.1 G/DL (ref 11.7–15.7)
HGB BLD-MCNC: 9.8 G/DL (ref 11.7–15.7)
INR PPP: 1.3 (ref 0.86–1.14)
LDH SERPL L TO P-CCNC: 375 U/L (ref 81–234)
MAGNESIUM SERPL-MCNC: 2 MG/DL (ref 1.6–2.3)
MAGNESIUM SERPL-MCNC: 2.2 MG/DL (ref 1.6–2.3)
MCH RBC QN AUTO: 33.5 PG (ref 26.5–33)
MCH RBC QN AUTO: 34.1 PG (ref 26.5–33)
MCHC RBC AUTO-ENTMCNC: 31.9 G/DL (ref 31.5–36.5)
MCHC RBC AUTO-ENTMCNC: 32.9 G/DL (ref 31.5–36.5)
MCV RBC AUTO: 104 FL (ref 78–100)
MCV RBC AUTO: 105 FL (ref 78–100)
MICRO REPORT STATUS: NORMAL
NUM BPU REQUESTED: 2
O2/TOTAL GAS SETTING VFR VENT: 40 %
O2/TOTAL GAS SETTING VFR VENT: 40 %
OXYHGB MFR BLD: 95 % (ref 92–100)
PCO2 BLD: 45 MM HG (ref 35–45)
PCO2 BLD: 45 MM HG (ref 35–45)
PH BLD: 7.44 PH (ref 7.35–7.45)
PH BLD: 7.45 PH (ref 7.35–7.45)
PHOSPHATE SERPL-MCNC: 2 MG/DL (ref 2.5–4.5)
PHOSPHATE SERPL-MCNC: 2 MG/DL (ref 2.5–4.5)
PLATELET # BLD AUTO: 31 10E9/L (ref 150–450)
PLATELET # BLD AUTO: 33 10E9/L (ref 150–450)
PO2 BLD: 90 MM HG (ref 80–105)
PO2 BLD: 94 MM HG (ref 80–105)
POTASSIUM SERPL-SCNC: 3.1 MMOL/L (ref 3.4–5.3)
POTASSIUM SERPL-SCNC: 3.7 MMOL/L (ref 3.4–5.3)
POTASSIUM SERPL-SCNC: 4.9 MMOL/L (ref 3.4–5.3)
RBC # BLD AUTO: 2.42 10E12/L (ref 3.8–5.2)
RBC # BLD AUTO: 2.87 10E12/L (ref 3.8–5.2)
SODIUM SERPL-SCNC: 148 MMOL/L (ref 133–144)
SODIUM SERPL-SCNC: 150 MMOL/L (ref 133–144)
SPECIMEN SOURCE: NORMAL
WBC # BLD AUTO: 4 10E9/L (ref 4–11)
WBC # BLD AUTO: 4.5 10E9/L (ref 4–11)

## 2017-06-04 PROCEDURE — 25000132 ZZH RX MED GY IP 250 OP 250 PS 637: Performed by: INTERNAL MEDICINE

## 2017-06-04 PROCEDURE — 86140 C-REACTIVE PROTEIN: CPT | Performed by: INTERNAL MEDICINE

## 2017-06-04 PROCEDURE — 25000125 ZZHC RX 250: Performed by: INTERNAL MEDICINE

## 2017-06-04 PROCEDURE — 25000128 H RX IP 250 OP 636: Performed by: STUDENT IN AN ORGANIZED HEALTH CARE EDUCATION/TRAINING PROGRAM

## 2017-06-04 PROCEDURE — 40000556 ZZH STATISTIC PERIPHERAL IV START W US GUIDANCE

## 2017-06-04 PROCEDURE — 82805 BLOOD GASES W/O2 SATURATION: CPT | Performed by: INTERNAL MEDICINE

## 2017-06-04 PROCEDURE — 93010 ELECTROCARDIOGRAM REPORT: CPT | Performed by: INTERNAL MEDICINE

## 2017-06-04 PROCEDURE — 84132 ASSAY OF SERUM POTASSIUM: CPT | Performed by: INTERNAL MEDICINE

## 2017-06-04 PROCEDURE — 94003 VENT MGMT INPAT SUBQ DAY: CPT

## 2017-06-04 PROCEDURE — 25000125 ZZHC RX 250: Performed by: STUDENT IN AN ORGANIZED HEALTH CARE EDUCATION/TRAINING PROGRAM

## 2017-06-04 PROCEDURE — 85027 COMPLETE CBC AUTOMATED: CPT | Performed by: INTERNAL MEDICINE

## 2017-06-04 PROCEDURE — 40000275 ZZH STATISTIC RCP TIME EA 10 MIN

## 2017-06-04 PROCEDURE — 94640 AIRWAY INHALATION TREATMENT: CPT

## 2017-06-04 PROCEDURE — 00000146 ZZHCL STATISTIC GLUCOSE BY METER IP

## 2017-06-04 PROCEDURE — 84100 ASSAY OF PHOSPHORUS: CPT | Performed by: INTERNAL MEDICINE

## 2017-06-04 PROCEDURE — 80048 BASIC METABOLIC PNL TOTAL CA: CPT | Performed by: INTERNAL MEDICINE

## 2017-06-04 PROCEDURE — 71010 XR CHEST PORT 1 VW: CPT

## 2017-06-04 PROCEDURE — 40000014 ZZH STATISTIC ARTERIAL MONITORING DAILY

## 2017-06-04 PROCEDURE — 85384 FIBRINOGEN ACTIVITY: CPT | Performed by: INTERNAL MEDICINE

## 2017-06-04 PROCEDURE — 40000196 ZZH STATISTIC RAPCV CVP MONITORING

## 2017-06-04 PROCEDURE — 82330 ASSAY OF CALCIUM: CPT | Performed by: INTERNAL MEDICINE

## 2017-06-04 PROCEDURE — 25000128 H RX IP 250 OP 636: Performed by: INTERNAL MEDICINE

## 2017-06-04 PROCEDURE — 83735 ASSAY OF MAGNESIUM: CPT | Performed by: INTERNAL MEDICINE

## 2017-06-04 PROCEDURE — 20000004 ZZH R&B ICU UMMC

## 2017-06-04 PROCEDURE — 95951 ZZHC EEG VIDEO EACH 24 HR: CPT | Mod: ZF

## 2017-06-04 PROCEDURE — 25000132 ZZH RX MED GY IP 250 OP 250 PS 637: Performed by: NURSE PRACTITIONER

## 2017-06-04 PROCEDURE — 83615 LACTATE (LD) (LDH) ENZYME: CPT | Performed by: INTERNAL MEDICINE

## 2017-06-04 PROCEDURE — 82803 BLOOD GASES ANY COMBINATION: CPT | Performed by: INTERNAL MEDICINE

## 2017-06-04 PROCEDURE — 27210437 ZZH NUTRITION PRODUCT SEMIELEM INTERMED LITER

## 2017-06-04 PROCEDURE — 99233 SBSQ HOSP IP/OBS HIGH 50: CPT | Mod: GC | Performed by: INTERNAL MEDICINE

## 2017-06-04 PROCEDURE — 27210995 ZZH RX 272

## 2017-06-04 PROCEDURE — 85610 PROTHROMBIN TIME: CPT | Performed by: INTERNAL MEDICINE

## 2017-06-04 PROCEDURE — 94640 AIRWAY INHALATION TREATMENT: CPT | Mod: 76

## 2017-06-04 PROCEDURE — 93005 ELECTROCARDIOGRAM TRACING: CPT

## 2017-06-04 RX ORDER — MAGNESIUM SULFATE HEPTAHYDRATE 40 MG/ML
4 INJECTION, SOLUTION INTRAVENOUS EVERY 4 HOURS PRN
Status: DISCONTINUED | OUTPATIENT
Start: 2017-06-04 | End: 2017-06-05

## 2017-06-04 RX ORDER — HYDROMORPHONE HCL/0.9% NACL/PF 0.2MG/0.2
0.2 SYRINGE (ML) INTRAVENOUS
Status: DISCONTINUED | OUTPATIENT
Start: 2017-06-04 | End: 2017-06-16 | Stop reason: HOSPADM

## 2017-06-04 RX ORDER — LANOLIN ALCOHOL/MO/W.PET/CERES
3 CREAM (GRAM) TOPICAL
Status: DISCONTINUED | OUTPATIENT
Start: 2017-06-04 | End: 2017-06-16 | Stop reason: HOSPADM

## 2017-06-04 RX ORDER — POTASSIUM CL/LIDO/0.9 % NACL 10MEQ/0.1L
10 INTRAVENOUS SOLUTION, PIGGYBACK (ML) INTRAVENOUS
Status: DISCONTINUED | OUTPATIENT
Start: 2017-06-04 | End: 2017-06-05

## 2017-06-04 RX ORDER — POTASSIUM CHLORIDE 7.45 MG/ML
10 INJECTION INTRAVENOUS
Status: DISCONTINUED | OUTPATIENT
Start: 2017-06-04 | End: 2017-06-05

## 2017-06-04 RX ORDER — SODIUM CHLORIDE 9 MG/ML
INJECTION, SOLUTION INTRAVENOUS
Status: DISCONTINUED
Start: 2017-06-04 | End: 2017-06-05 | Stop reason: HOSPADM

## 2017-06-04 RX ORDER — OXYCODONE HYDROCHLORIDE 5 MG/1
5 TABLET ORAL EVERY 4 HOURS PRN
Status: DISCONTINUED | OUTPATIENT
Start: 2017-06-04 | End: 2017-06-16 | Stop reason: HOSPADM

## 2017-06-04 RX ORDER — POTASSIUM CHLORIDE 29.8 MG/ML
20 INJECTION INTRAVENOUS
Status: DISCONTINUED | OUTPATIENT
Start: 2017-06-04 | End: 2017-06-05

## 2017-06-04 RX ORDER — FUROSEMIDE 10 MG/ML
40 INJECTION INTRAMUSCULAR; INTRAVENOUS ONCE
Status: COMPLETED | OUTPATIENT
Start: 2017-06-04 | End: 2017-06-04

## 2017-06-04 RX ORDER — POTASSIUM CHLORIDE 750 MG/1
20-40 TABLET, EXTENDED RELEASE ORAL
Status: DISCONTINUED | OUTPATIENT
Start: 2017-06-04 | End: 2017-06-05

## 2017-06-04 RX ORDER — HYDROMORPHONE HCL/0.9% NACL/PF 0.2MG/0.2
0.2 SYRINGE (ML) INTRAVENOUS
Status: DISCONTINUED | OUTPATIENT
Start: 2017-06-04 | End: 2017-06-04

## 2017-06-04 RX ORDER — POTASSIUM CHLORIDE 1.5 G/1.58G
20-40 POWDER, FOR SOLUTION ORAL
Status: DISCONTINUED | OUTPATIENT
Start: 2017-06-04 | End: 2017-06-05

## 2017-06-04 RX ADMIN — POTASSIUM CHLORIDE 20 MEQ: 1.5 POWDER, FOR SOLUTION ORAL at 05:05

## 2017-06-04 RX ADMIN — FOLIC ACID 1 MG: 1 TABLET ORAL at 08:21

## 2017-06-04 RX ADMIN — IPRATROPIUM BROMIDE 0.5 MG: 0.5 SOLUTION RESPIRATORY (INHALATION) at 12:36

## 2017-06-04 RX ADMIN — GABAPENTIN 200 MG: 250 SOLUTION ORAL at 08:23

## 2017-06-04 RX ADMIN — HYDROCORTISONE SODIUM SUCCINATE 50 MG: 100 INJECTION, POWDER, FOR SOLUTION INTRAMUSCULAR; INTRAVENOUS at 04:28

## 2017-06-04 RX ADMIN — IPRATROPIUM BROMIDE 0.5 MG: 0.5 SOLUTION RESPIRATORY (INHALATION) at 19:52

## 2017-06-04 RX ADMIN — MELATONIN TAB 3 MG 3 MG: 3 TAB at 22:39

## 2017-06-04 RX ADMIN — HYDROCORTISONE SODIUM SUCCINATE 50 MG: 100 INJECTION, POWDER, FOR SOLUTION INTRAMUSCULAR; INTRAVENOUS at 11:05

## 2017-06-04 RX ADMIN — POTASSIUM CHLORIDE 20 MEQ: 29.8 INJECTION, SOLUTION INTRAVENOUS at 18:37

## 2017-06-04 RX ADMIN — PANTOPRAZOLE SODIUM 40 MG: 40 TABLET, DELAYED RELEASE ORAL at 08:23

## 2017-06-04 RX ADMIN — HUMAN INSULIN 2 UNITS/HR: 100 INJECTION, SOLUTION SUBCUTANEOUS at 03:49

## 2017-06-04 RX ADMIN — OYSTER SHELL CALCIUM WITH VITAMIN D 2 TABLET: 500; 200 TABLET, FILM COATED ORAL at 08:21

## 2017-06-04 RX ADMIN — SILVER SULFADIAZINE: 10 CREAM TOPICAL at 08:50

## 2017-06-04 RX ADMIN — MULTIVIT AND MINERALS-FERROUS GLUCONATE 9 MG IRON/15 ML ORAL LIQUID 15 ML: at 08:21

## 2017-06-04 RX ADMIN — GABAPENTIN 200 MG: 250 SOLUTION ORAL at 20:00

## 2017-06-04 RX ADMIN — SODIUM PHOSPHATE, MONOBASIC, MONOHYDRATE AND SODIUM PHOSPHATE, DIBASIC, ANHYDROUS 15 MMOL: 276; 142 INJECTION, SOLUTION INTRAVENOUS at 22:43

## 2017-06-04 RX ADMIN — HYDROCORTISONE SODIUM SUCCINATE 25 MG: 100 INJECTION, POWDER, FOR SOLUTION INTRAMUSCULAR; INTRAVENOUS at 20:05

## 2017-06-04 RX ADMIN — HUMAN INSULIN 2 UNITS/HR: 100 INJECTION, SOLUTION SUBCUTANEOUS at 21:12

## 2017-06-04 RX ADMIN — ERTAPENEM SODIUM 1 G: 1 INJECTION, POWDER, LYOPHILIZED, FOR SOLUTION INTRAMUSCULAR; INTRAVENOUS at 16:49

## 2017-06-04 RX ADMIN — LEVETIRACETAM 1000 MG: 100 SOLUTION ORAL at 08:22

## 2017-06-04 RX ADMIN — LEVETIRACETAM 1000 MG: 100 SOLUTION ORAL at 20:01

## 2017-06-04 RX ADMIN — FUROSEMIDE 40 MG: 10 INJECTION, SOLUTION INTRAVENOUS at 13:14

## 2017-06-04 RX ADMIN — ACETAMINOPHEN 650 MG: 325 TABLET, FILM COATED ORAL at 18:26

## 2017-06-04 RX ADMIN — DIGOXIN 125 MCG: 125 TABLET ORAL at 08:21

## 2017-06-04 RX ADMIN — HYDROMORPHONE HYDROCHLORIDE 0.2 MG: 10 INJECTION, SOLUTION INTRAMUSCULAR; INTRAVENOUS; SUBCUTANEOUS at 23:31

## 2017-06-04 RX ADMIN — SODIUM PHOSPHATE, MONOBASIC, MONOHYDRATE AND SODIUM PHOSPHATE, DIBASIC, ANHYDROUS 15 MMOL: 276; 142 INJECTION, SOLUTION INTRAVENOUS at 08:08

## 2017-06-04 RX ADMIN — GABAPENTIN 200 MG: 250 SOLUTION ORAL at 13:08

## 2017-06-04 RX ADMIN — Medication 2 G: at 06:10

## 2017-06-04 RX ADMIN — IPRATROPIUM BROMIDE 0.5 MG: 0.5 SOLUTION RESPIRATORY (INHALATION) at 16:30

## 2017-06-04 NOTE — PLAN OF CARE
Problem: Goal Outcome Summary  Goal: Goal Outcome Summary  SLP: Orders received for bedside swallow evaluation.  Pt extubated this date.  Per best practice protocol, pt to be seen 24 hours following extubation.  ST to f/u tomorrow.

## 2017-06-04 NOTE — PROGRESS NOTES
Patient's vitals remained stable throughout night. MAP>65, HR 70s-110 (Afib/AAI paced @ 74 occasionally), SpO2 >94%, CVP 8-11, RR teens-20s. Continues to follow commands, U/O 30-45 cc/hr. Insulin @ 3 units/hr, Fentanyl @ 75 mcg/hr. Continues to have loose/liquid stools q 2 hours. Femoral lines d/c'd in the evening without complications; new arterial line placed by fellow. Continues to be EEG monitored. Vent settings: AC-VC, RR 12, , Peep 5, FiO2 40%. Electrolytes supplemented. Plan is to pressure support today with extubation if possible. Will continue to monitor patient and update MDs as needed.

## 2017-06-04 NOTE — PROGRESS NOTES
CARDIOLOGY CSI PROGRESS NOTE    SUBJECTIVE:  Yesterday, Mrs. Michel had her RCFA a-line and RCFV CVL removed. Had right radial A line placed last night. Had a brief PSV but appeared to become more tachycardic after approx two hour thus halted. Required a bolus of fluid for progressive tachycardia to 140s yesterday. Received two units of platelets yesterday. Intermittent lack of capture via transvenous pacer.     Extubated uneventfully 06/04 AM.     ROS otherwise negative.    OBJECTIVE:  Vital signs:  119/80 (Range 111-156/40-77)  HR 89 ()  RR 24 (14-24) with vent settings 7/7/40  96.8 (Tm 97.8)  Weight 06/04 168 lbs (06/03 166 lbs)     I/O: +1877 mL  Intake: 3300 mL (960 enteral, 918 IV, 572 blood, 600 NGT, 250 IV PB)  Output: 1423 (873 urine, 550 NGT)    PHYSICAL EXAM:  Gen: Looks well, intubated, no distress  HEENT: Dry MM, RIJV transvenous pacer  Resp: No signs of resp distress, CTAB with good mechanical breath sounds anteriorly  CVS: No appreciable JVD (TV pacer), S1+S2 without added sounds or murmurs  Abdo: ND, S, +BS  Extremities: Warm, well-perfused, left and right groin sites without thrill/bruit, only minor bruising. Intact popliteal, DP/PT bilaterally  Neuro: GCS [id number]     Recent Labs   Lab Test  06/04/17   0408   06/01/17   0342   HGB  8.1* (8.5)   < >  8.1*   HCT  25.4* (26)   < >  23.5*   WBC  4.0   < >  5.9   MCV  105*   < >  100   MCH  33.5*   < >  34.5*   MCHC  31.9   < >  34.5   RDW  19.6*   < >  22.3*   PLT  33* (29)   < >  43*   NA  148* (148)   < >  147*   POTASSIUM  3.7 (4.0)   < >  3.7   CHLORIDE  113* (112)   < >  109   CO2  30 (31)   < >  32   BUN  41* (44)   < >  55*   CR  0.77 (0.94)   < >  1.75*   GLC  140*    < >  124*   VIKTORIYA  7.7*   < >  7.9*   ALBUMIN   --    --   1.9*   BILITOTAL   --    --   2.6*   ALKPHOS   --    --   125   AST   --    --   597*   ALT   --    --   891*    < > = values in this interval not displayed.   Glc range 112-160  06/04   06/04 INR 1.30   06/04  Ionized calcium 4.6   Fibrinogen 235   ABG 17 04:08: 7.45/45/90/31 FiO2 40, Oxy-hb 95     Micro:   BCx right arm: NG x 3d   C. Dif: negative    BCx right arm: NG x 4d    BCx left femoral central line: NG x 5d     Imagin/04 CXR: ETT approx 3.7 cm above trache. Transvenous pacer in RA; stable left-sided infiltrates and lingular/LLL collapse. Right-sided infiltrates mildly improved.    Cardiac meds: Digoxin 125 micrograms q24h   Non-cardiac meds: Calcium carbonate/vit D 1250 mg q24h, Ertapenem 1g q24h, folic acid 1 mg q24h, gabapentin 200 mg TID, hydrocortisone taper, levetiracetam 1000 mg BID, pantoprazole 40 mg q24h, silver sulfadiazine, senna-docusate  Drips:  Fentanyl 75 micrograms/hr  Insulin 3 units/hr     ASSESSMENT/PLAN:  Mrs. Amelia Michel is a 56-year old lady with a PMHx of VSD repair (), RA, and a recent diagnosis of AFib/AFlutter/SVT who presented to [facility] on  after OOH cardiac arrest with shockable rhythm. After ROSC in the field, she was transferred to [facility] for further management,    - Hypoxic respiratory failure   - Swallow eval after extubation   - Furosemide IV, if needed, after extubation to overcome loss of PEEP and net positive balance for last 48h   - Using non-adrenergic nebs only   - Ertapenem  -->     - Out-of-hospital cardiac arrest and cardiogenic shock; Hx of AFib/Aflutter/SVT; sinus arrest   - Continue digoxin 0.125 mg q24h   - AICD prior to discharge     - ESBL UTI, aspiration pneumonia   - Continue ertapenem   - s/p vanc    - RA   - Weaning of stress dosed steroids ongoing (will complete on )   - Hold immunosuppressants     - CELSA   - Baseline Cr 0.7-0.8; peak Cr 1.9   - Avoid nephrotoxics    - Ischemic liver injury complicated by coagulopathy; severe thrombocytopenia   - Feeding via NGT   - Monitor liver enzymes   - Monitor for signs of bleeding    - Hypoglycemia; hypovolemic hypernatremia   - Maintain stable feeds in view of  net positive intake on 06/03    - Insulin infusion    - NGT feeding    Seen and staffed with Dr. Angela.    Gilbert Og  Cardiology Fellow  Pager: [phone]      Staff Cardiologist: I evaluated the patient on 6/4/17 with the cardiology fellow.  Mrs. Michel has had a remarkable recovery from cardiac arrest complicated by respiratory failure / aspiration pneumonica, ischemic liver injury with secondary coagulopathy, CLESA, and electrolyte imbalance.  She has underlying AF and has atrial pacer in place but it does not appear to be capturing; her ventricular rate is in  range.  I have asked EP for input regarding possible AF ablation and potential dual chamber pacemaker.  We still do not have a good explanation for why she had possible sinus arrest but her intolerance to beta blockers suggests underlying myopathic process (albeit not evident on cardiac MRI).  She was extubated today without incident and all arterial and venous lines have been removed with exception of radial A line that can be removed today.  I spoke to patient's family and updated them on the patient's progress.    Dilan Angela MD

## 2017-06-04 NOTE — PROGRESS NOTES
EEG CLINICAL NEUROPHYSIOLOGY PRELIMINARY REPORT    Video-EEG through 0600 today reviewed; full repot to follow. Most of record with evidence of moderate diffuse encephalopathy. EEG however clearly reactive and at times an alpha rhythm in range of 9 Hz is seen. Left posterior hemisphere epielptiiform activity continues indicating focal cortical irritability in this region. No overt seizures.    Study with clear improvement compared to initial studies in this series of studies. Epileptiform activity continues indicating increased seizure tendency but no seizures recorded.    Hi Higginbotham MD  Pager [phone]

## 2017-06-04 NOTE — PROGRESS NOTES
[facility], [place]  Procedure Note          Extubation:       Amelia Michel  MRN# [id number]   June 4, 2017, 1:25 PM         Patient extubated at: June 4, 2017, 10:55 AM   Supplemental Oxygen: Via nasal cannula at 4 liters per minute   Cough: The cough is good   Secretion Mode: Able to clear   Secretion Amount: Small amount, moderately thin and white / yellow in color   Respiratory Exam:: Breath sounds: good aeration     Location: bilaterally   Skin Exam:: Patient color: natural   Patient Status: Currently appears comfortable   Arterial Blood Gasses: pH Arterial (pH)   Date Value   06/04/2017 7.44     pO2 Arterial (mm Hg)   Date Value   06/04/2017 94     pCO2 Arterial (mm Hg)   Date Value   06/04/2017 45     Bicarbonate Arterial (mmol/L)   Date Value   06/04/2017 30 (H)            Recorded by Lara Dwyer

## 2017-06-04 NOTE — PROCEDURES
EEG #:  -6      DATE OF STUDY: 2017.       TYPE OF STUDY:  Inpatient video EEG monitoring (ICU).       DURATION OF RECORDIN hours 36 minutes 18 seconds.      HISTORY:  Day #6 of video-EEG monitoring in patient, Amelia Michel, a 56-year-old who is status post cardiac arrest and therapeutic hypothermia.  Hypothermia has been discontinued several days prior.  Left posterior hemisphere epileptiform activity has appeared.  She is being treated with levetiracetam.  EEG is being performed to assess for epilepsy in the occurrence of seizures following cardiac arrest.      TECHNICAL SUMMARY:  EEG was recorded utilizing scalp electrodes placed according to the 10-20 international system.  Additional electrodes were used for artifact detection, referencing, and recording from additional cerebral regions.  Video was continuously recorded.  Video was reviewed for purposes of clinical correlation and to assist with EEG interpretation.       FINDINGS:  When patient is let alone, irregular diffuse delta with occasional runs of theta.  There are spontaneous arousals in which a disorganized posterior dominant rhythm appears.  There is no clear focal slowing.  Stimulation is performed following ICU protocol at around 11:42.  Irregular diffuse delta pattern gives way to a fairly well organized 8-9 Hz posterior dominant rhythm.  Moderate amounts of irregular delta are noted and these waxed and waned during these periods of time, but there are stretches with fairly well-organized posterior dominant rhythm that last for 10-20 seconds at a time.  As patient is left alone, irregular diffuse slowing again supervenes.      INTERICTAL ACTIVITIES:  Posterior sharp continue.  Occasionally, spikes.  Left posterior hemisphere amplitude predominance.      ICTAL:  No electrographic seizures.        IMPRESSION:  Abnormal.  Moderate diffuse encephalopathy.  EEG is clearly reactive and for brief stretches of the recording normal  posterior dominant rhythm with normal frequencies is seen.  However, diffuse irregular delta is seen even during these portions of the record.  Nonetheless, the record continues improving and is much better than during periods of hypothermia.  Left posterior hemisphere epileptiform activity continues indicating focal cortical irritability and increased seizure tendency in this area.  However, overt seizures not recorded.         KRISTOPHER LUU MD             D: 2017 10:01   T: 2017 12:18   MT: TINO      Name:     FIDELIA MIDDLETON   MRN:      [id number]        Account:        [id number]   :      1960           Procedure Date: 2017      Document: [id number]

## 2017-06-04 NOTE — PLAN OF CARE
Problem: Goal Outcome Summary  Goal: Goal Outcome Summary  Outcome: Improving  D/I/A: Patient s/p cardiac arrest. All sedation turned off today, patient extubated at 1100. Patient is alert and oriented but remains lethargic. Complained of weakness in extremities, CSI fellow examined and diagnosed critical illness myopathy. Acknowledged pain in chest at CPR site, prn tylenol given.  Remains in Afib 80-110s, temp pacer turned off by Dr. Carrion. Remains on 3 L NC, O2 sats in upper 90s. TF remain at goal, insulin gtt continues, rectal pouch inserted this am. Lasix given this afternoon with good urine output. Replaced K+ of 3.1. Patient was up to chair. Family at bedside and updated on plan of care.      P: Continue to rehab and transfer to floor when appropriate.

## 2017-06-04 NOTE — PROCEDURES
EEG #:  -5      TYPE OF STUDY:  Inpatient video EEG monitoring.       DATE OF STUDY:  2017.       DURATION OF RECORDIN hours 53 minutes 20 seconds.      HISTORY:  Day 5 of video EEG monitoring in patient, Amelia Michel, a 56-year-old being evaluated following a cardiac arrest.  She underwent therapeutic hypothermia, which has been discontinued.  Left posterior hemisphere sharp waves have emerged and Levetiracetam has been initiated.  She is currently being treated with fentanyl 25 mcg per hour.  She is being evaluated for seizures in the setting of discontinuation of hypothermia and epileptiform activity.     TECHNICAL SUMMARY: This continuous video- EEG monitoring procedure was performed with 23 scalp electrodes in 10-20 electrode system placements, and additional scalp, precordial and other surface electrodes used for electrical referencing and artifact detection.  Video monitoring was utilized and periodically reviewed by EEG technologists and the physician for electroclinical correlations.     FINDINGS:  During some periods of EEG, diffuse irregular delta, reasonably continuous, amplitudes in the range of 40-50 microvolts noted.  During other periods of time more attenuated mixture of alpha and theta without a well-organized posterior dominant rhythm.  There are some acute changes with emergence of delta as patient becomes more agitated.  However, stimulation utilizing ICU protocol at around 0954 does not really induce significant changes in background.      OTHER INTERICTAL ABNORMALITIES:  Occasional left posterior hemisphere spikes and sharp waves noted.  These are usually end of chain at O1.  Occasionally, an overt T5 phase reversal is seen with longitudinal bipolar montages.  With hatband montage, O1 phase reversal is often seen.  Field sometimes extends to right posterior hemisphere as well.  Occipital sharp waves occur infrequently.  They are present really in all portions of the  recording.       ICTAL:  No electrographic seizures.      IMPRESSION:  Abnormal, consistent with moderate, sometimes moderate to marked diffuse encephalopathy.  However, there is obvious and clear improvement following discontinuation of hypothermia and discontinuation of anesthetic drips.  A well-organized posterior dominant rhythm is not noted, but there are some clear changes between more aroused and less aroused portions of the recording.  Left posterior temporal epileptiform discharges continue indicating focal cortical irritability in this region.  Overt seizures, however, were not seen.         KRISTOPHER LUU MD             D: 2017 12:05   T: 2017 11:02   MT: TINO      Name:     FIDELIA MIDDLETON   MRN:      [id number]        Account:        [id number]   :      1960           Procedure Date: 2017      Document: [id number]

## 2017-06-04 NOTE — MR AVS SNAPSHOT
After Visit Summary   6/4/2017    Amelia Michel    MRN: [id number]           Patient Information     Date Of Birth          1960        Visit Information        Provider Department      6/4/2017 7:00 AM [facility] EEG TECH 4 [facility]        Today's Diagnoses     Atrial tachycardia (H)    -  1       Follow-ups after your visit        Who to contact     Please call your clinic at [phone] to:    Ask questions about your health    Make or cancel appointments    Discuss your medicines    Learn about your test results    Speak to your doctor   If you have compliments or concerns about an experience at your clinic, or if you wish to file a complaint, please contact [facility] Patient Relations at [phone] or email us at [email or web address]         Additional Information About Your Visit        MyChart Information     [employer] gives you secure access to your electronic health record. If you see a primary care provider, you can also send messages to your care team and make appointments. If you have questions, please call your primary care clinic.  If you do not have a primary care provider, please call [phone] and they will assist you.      [employer] is an electronic gateway that provides easy, online access to your medical records. With [employer], you can request a clinic appointment, read your test results, renew a prescription or communicate with your care team.     To access your existing account, please contact your [facility] or call [phone] for assistance.        Care EveryWhere ID     This is your Care EveryWhere ID. This could be used by other organizations to access your [place] medical records  [id number]         Blood Pressure from Last 3 Encounters:   06/03/17 119/80   05/23/17 125/53   04/04/17 104/78    Weight from Last 3 Encounters:   06/04/17 76.5 kg (168 lb 10.4 oz)   05/23/17 64.4 kg (141 lb 15.6 oz)    04/04/17 61.6 kg (135 lb 12.8 oz)              We Performed the Following     Glucose by meter     Glucose by meter     Glucose by meter     Glucose by meter     Glucose by meter          Today's Medication Changes      Notice     This visit is during an admission. Changes to the med list made in this visit will be reflected in the After Visit Summary of the admission.             Primary Care Provider Office Phone # Fax #    Comfort Sabillon [phone]       [facility] [address] [facility] [id number]        Thank you!     Thank you for choosing [facility]  for your care. Our goal is always to provide you with excellent care. Hearing back from our patients is one way we can continue to improve our services. Please take a few minutes to complete the written survey that you may receive in the mail after your visit with us. Thank you!             Your Updated Medication List - Protect others around you: Learn how to safely use, store and throw away your medicines at www.disposemymeds.org.      Notice     This visit is during an admission. Changes to the med list made in this visit will be reflected in the After Visit Summary of the admission.

## 2017-06-05 ENCOUNTER — APPOINTMENT (OUTPATIENT)
Dept: OCCUPATIONAL THERAPY | Facility: CLINIC | Age: 57
DRG: 260 | End: 2017-06-05
Attending: INTERNAL MEDICINE
Payer: COMMERCIAL

## 2017-06-05 ENCOUNTER — APPOINTMENT (OUTPATIENT)
Dept: ULTRASOUND IMAGING | Facility: CLINIC | Age: 57
DRG: 260 | End: 2017-06-05
Attending: NURSE PRACTITIONER
Payer: COMMERCIAL

## 2017-06-05 ENCOUNTER — APPOINTMENT (OUTPATIENT)
Dept: PHYSICAL THERAPY | Facility: CLINIC | Age: 57
DRG: 260 | End: 2017-06-05
Attending: NURSE PRACTITIONER
Payer: COMMERCIAL

## 2017-06-05 ENCOUNTER — APPOINTMENT (OUTPATIENT)
Dept: CARDIOLOGY | Facility: CLINIC | Age: 57
DRG: 260 | End: 2017-06-05
Attending: NURSE PRACTITIONER
Payer: COMMERCIAL

## 2017-06-05 ENCOUNTER — APPOINTMENT (OUTPATIENT)
Dept: SPEECH THERAPY | Facility: CLINIC | Age: 57
DRG: 260 | End: 2017-06-05
Attending: INTERNAL MEDICINE
Payer: COMMERCIAL

## 2017-06-05 LAB
ABO + RH BLD: NORMAL
ABO + RH BLD: NORMAL
ALBUMIN SERPL-MCNC: 1.9 G/DL (ref 3.4–5)
ALP SERPL-CCNC: 149 U/L (ref 40–150)
ALT SERPL W P-5'-P-CCNC: 213 U/L (ref 0–50)
ANION GAP SERPL CALCULATED.3IONS-SCNC: 5 MMOL/L (ref 3–14)
ANION GAP SERPL CALCULATED.3IONS-SCNC: 6 MMOL/L (ref 3–14)
AST SERPL W P-5'-P-CCNC: 43 U/L (ref 0–45)
BACTERIA SPEC CULT: NO GROWTH
BASOPHILS # BLD AUTO: 0 10E9/L (ref 0–0.2)
BASOPHILS NFR BLD AUTO: 0 %
BILIRUB DIRECT SERPL-MCNC: 0.7 MG/DL (ref 0–0.2)
BILIRUB SERPL-MCNC: 1.7 MG/DL (ref 0.2–1.3)
BLD GP AB SCN SERPL QL: NORMAL
BLD PROD TYP BPU: NORMAL
BLD UNIT ID BPU: 0
BLOOD BANK CMNT PATIENT-IMP: NORMAL
BLOOD PRODUCT CODE: NORMAL
BPU ID: NORMAL
BUN SERPL-MCNC: 35 MG/DL (ref 7–30)
BUN SERPL-MCNC: 39 MG/DL (ref 7–30)
C DIFF TOX B STL QL: NORMAL
CALCIUM SERPL-MCNC: 7.6 MG/DL (ref 8.5–10.1)
CALCIUM SERPL-MCNC: 8.2 MG/DL (ref 8.5–10.1)
CHLORIDE SERPL-SCNC: 107 MMOL/L (ref 94–109)
CHLORIDE SERPL-SCNC: 111 MMOL/L (ref 94–109)
CO2 SERPL-SCNC: 34 MMOL/L (ref 20–32)
CO2 SERPL-SCNC: 36 MMOL/L (ref 20–32)
CREAT SERPL-MCNC: 0.71 MG/DL (ref 0.52–1.04)
CREAT SERPL-MCNC: 0.79 MG/DL (ref 0.52–1.04)
DIFFERENTIAL METHOD BLD: ABNORMAL
DIGOXIN SERPL-MCNC: 0.6 UG/L (ref 0.5–2)
EOSINOPHIL # BLD AUTO: 0 10E9/L (ref 0–0.7)
EOSINOPHIL NFR BLD AUTO: 0 %
ERYTHROCYTE [DISTWIDTH] IN BLOOD BY AUTOMATED COUNT: 19 % (ref 10–15)
ERYTHROCYTE [DISTWIDTH] IN BLOOD BY AUTOMATED COUNT: 19.2 % (ref 10–15)
ERYTHROCYTE [DISTWIDTH] IN BLOOD BY AUTOMATED COUNT: 19.5 % (ref 10–15)
GFR SERPL CREATININE-BSD FRML MDRD: 75 ML/MIN/1.7M2
GFR SERPL CREATININE-BSD FRML MDRD: 85 ML/MIN/1.7M2
GLUCOSE BLDC GLUCOMTR-MCNC: 103 MG/DL (ref 70–99)
GLUCOSE BLDC GLUCOMTR-MCNC: 116 MG/DL (ref 70–99)
GLUCOSE BLDC GLUCOMTR-MCNC: 122 MG/DL (ref 70–99)
GLUCOSE BLDC GLUCOMTR-MCNC: 125 MG/DL (ref 70–99)
GLUCOSE BLDC GLUCOMTR-MCNC: 128 MG/DL (ref 70–99)
GLUCOSE BLDC GLUCOMTR-MCNC: 129 MG/DL (ref 70–99)
GLUCOSE BLDC GLUCOMTR-MCNC: 137 MG/DL (ref 70–99)
GLUCOSE BLDC GLUCOMTR-MCNC: 138 MG/DL (ref 70–99)
GLUCOSE BLDC GLUCOMTR-MCNC: 145 MG/DL (ref 70–99)
GLUCOSE BLDC GLUCOMTR-MCNC: 145 MG/DL (ref 70–99)
GLUCOSE BLDC GLUCOMTR-MCNC: 150 MG/DL (ref 70–99)
GLUCOSE BLDC GLUCOMTR-MCNC: 162 MG/DL (ref 70–99)
GLUCOSE BLDC GLUCOMTR-MCNC: 166 MG/DL (ref 70–99)
GLUCOSE BLDC GLUCOMTR-MCNC: 90 MG/DL (ref 70–99)
GLUCOSE SERPL-MCNC: 134 MG/DL (ref 70–99)
GLUCOSE SERPL-MCNC: 136 MG/DL (ref 70–99)
HCT VFR BLD AUTO: 23.9 % (ref 35–47)
HCT VFR BLD AUTO: 25.9 % (ref 35–47)
HCT VFR BLD AUTO: 27.1 % (ref 35–47)
HGB BLD-MCNC: 7.5 G/DL (ref 11.7–15.7)
HGB BLD-MCNC: 8.4 G/DL (ref 11.7–15.7)
HGB BLD-MCNC: 8.6 G/DL (ref 11.7–15.7)
IMM GRANULOCYTES # BLD: 0 10E9/L (ref 0–0.4)
IMM GRANULOCYTES NFR BLD: 0.3 %
INR PPP: 1.3 (ref 0.86–1.14)
LDH SERPL L TO P-CCNC: 353 U/L (ref 81–234)
LYMPHOCYTES # BLD AUTO: 0.6 10E9/L (ref 0.8–5.3)
LYMPHOCYTES NFR BLD AUTO: 15.3 %
MAGNESIUM SERPL-MCNC: 2 MG/DL (ref 1.6–2.3)
MAGNESIUM SERPL-MCNC: 2.1 MG/DL (ref 1.6–2.3)
MCH RBC QN AUTO: 32.8 PG (ref 26.5–33)
MCH RBC QN AUTO: 33.3 PG (ref 26.5–33)
MCH RBC QN AUTO: 33.6 PG (ref 26.5–33)
MCHC RBC AUTO-ENTMCNC: 31.4 G/DL (ref 31.5–36.5)
MCHC RBC AUTO-ENTMCNC: 31.7 G/DL (ref 31.5–36.5)
MCHC RBC AUTO-ENTMCNC: 32.4 G/DL (ref 31.5–36.5)
MCV RBC AUTO: 104 FL (ref 78–100)
MCV RBC AUTO: 104 FL (ref 78–100)
MCV RBC AUTO: 105 FL (ref 78–100)
MICRO REPORT STATUS: NORMAL
MONOCYTES # BLD AUTO: 0 10E9/L (ref 0–1.3)
MONOCYTES NFR BLD AUTO: 0.8 %
NEUTROPHILS # BLD AUTO: 3 10E9/L (ref 1.6–8.3)
NEUTROPHILS NFR BLD AUTO: 83.6 %
NRBC # BLD AUTO: 0 10*3/UL
NRBC BLD AUTO-RTO: 0 /100
NUM BPU REQUESTED: 1
NUM BPU REQUESTED: 2
PHOSPHATE SERPL-MCNC: 2 MG/DL (ref 2.5–4.5)
PHOSPHATE SERPL-MCNC: 2.7 MG/DL (ref 2.5–4.5)
PLATELET # BLD AUTO: 15 10E9/L (ref 150–450)
PLATELET # BLD AUTO: 15 10E9/L (ref 150–450)
PLATELET # BLD AUTO: 68 10E9/L (ref 150–450)
POTASSIUM SERPL-SCNC: 3.1 MMOL/L (ref 3.4–5.3)
POTASSIUM SERPL-SCNC: 3.5 MMOL/L (ref 3.4–5.3)
POTASSIUM SERPL-SCNC: 3.8 MMOL/L (ref 3.4–5.3)
PROT SERPL-MCNC: 4.2 G/DL (ref 6.8–8.8)
RBC # BLD AUTO: 2.29 10E12/L (ref 3.8–5.2)
RBC # BLD AUTO: 2.5 10E12/L (ref 3.8–5.2)
RBC # BLD AUTO: 2.58 10E12/L (ref 3.8–5.2)
SODIUM SERPL-SCNC: 149 MMOL/L (ref 133–144)
SODIUM SERPL-SCNC: 151 MMOL/L (ref 133–144)
SPECIMEN EXP DATE BLD: NORMAL
SPECIMEN SOURCE: NORMAL
SPECIMEN SOURCE: NORMAL
TRANSFUSION STATUS PATIENT QL: NORMAL
WBC # BLD AUTO: 3.3 10E9/L (ref 4–11)
WBC # BLD AUTO: 3.5 10E9/L (ref 4–11)
WBC # BLD AUTO: 3.6 10E9/L (ref 4–11)

## 2017-06-05 PROCEDURE — 84100 ASSAY OF PHOSPHORUS: CPT | Performed by: INTERNAL MEDICINE

## 2017-06-05 PROCEDURE — 93308 TTE F-UP OR LMTD: CPT

## 2017-06-05 PROCEDURE — 84132 ASSAY OF SERUM POTASSIUM: CPT | Performed by: STUDENT IN AN ORGANIZED HEALTH CARE EDUCATION/TRAINING PROGRAM

## 2017-06-05 PROCEDURE — 87493 C DIFF AMPLIFIED PROBE: CPT | Performed by: NURSE PRACTITIONER

## 2017-06-05 PROCEDURE — 25800025 ZZH RX 258: Performed by: NURSE PRACTITIONER

## 2017-06-05 PROCEDURE — 83615 LACTATE (LD) (LDH) ENZYME: CPT | Performed by: INTERNAL MEDICINE

## 2017-06-05 PROCEDURE — 86022 PLATELET ANTIBODIES: CPT | Performed by: INTERNAL MEDICINE

## 2017-06-05 PROCEDURE — 40000275 ZZH STATISTIC RCP TIME EA 10 MIN

## 2017-06-05 PROCEDURE — 25000128 H RX IP 250 OP 636: Performed by: NURSE PRACTITIONER

## 2017-06-05 PROCEDURE — 86850 RBC ANTIBODY SCREEN: CPT | Performed by: INTERNAL MEDICINE

## 2017-06-05 PROCEDURE — 25000132 ZZH RX MED GY IP 250 OP 250 PS 637: Performed by: INTERNAL MEDICINE

## 2017-06-05 PROCEDURE — 25000125 ZZHC RX 250: Performed by: STUDENT IN AN ORGANIZED HEALTH CARE EDUCATION/TRAINING PROGRAM

## 2017-06-05 PROCEDURE — 85610 PROTHROMBIN TIME: CPT | Performed by: INTERNAL MEDICINE

## 2017-06-05 PROCEDURE — 86901 BLOOD TYPING SEROLOGIC RH(D): CPT | Performed by: INTERNAL MEDICINE

## 2017-06-05 PROCEDURE — 92610 EVALUATE SWALLOWING FUNCTION: CPT | Mod: GN | Performed by: SPEECH-LANGUAGE PATHOLOGIST

## 2017-06-05 PROCEDURE — 80048 BASIC METABOLIC PNL TOTAL CA: CPT | Performed by: INTERNAL MEDICINE

## 2017-06-05 PROCEDURE — 80162 ASSAY OF DIGOXIN TOTAL: CPT | Performed by: INTERNAL MEDICINE

## 2017-06-05 PROCEDURE — 94640 AIRWAY INHALATION TREATMENT: CPT | Mod: 76

## 2017-06-05 PROCEDURE — 40000264 ECHO LIMITED WITH LUMASON

## 2017-06-05 PROCEDURE — 97535 SELF CARE MNGMENT TRAINING: CPT | Mod: GO | Performed by: OCCUPATIONAL THERAPIST

## 2017-06-05 PROCEDURE — 97110 THERAPEUTIC EXERCISES: CPT | Mod: GP | Performed by: PHYSICAL THERAPIST

## 2017-06-05 PROCEDURE — P9037 PLATE PHERES LEUKOREDU IRRAD: HCPCS | Performed by: NURSE PRACTITIONER

## 2017-06-05 PROCEDURE — 20000004 ZZH R&B ICU UMMC

## 2017-06-05 PROCEDURE — 93321 DOPPLER ECHO F-UP/LMTD STD: CPT | Mod: 26 | Performed by: INTERNAL MEDICINE

## 2017-06-05 PROCEDURE — 86900 BLOOD TYPING SEROLOGIC ABO: CPT | Performed by: INTERNAL MEDICINE

## 2017-06-05 PROCEDURE — 93010 ELECTROCARDIOGRAM REPORT: CPT | Performed by: INTERNAL MEDICINE

## 2017-06-05 PROCEDURE — 80076 HEPATIC FUNCTION PANEL: CPT | Performed by: INTERNAL MEDICINE

## 2017-06-05 PROCEDURE — 00000146 ZZHCL STATISTIC GLUCOSE BY METER IP

## 2017-06-05 PROCEDURE — 85025 COMPLETE CBC W/AUTO DIFF WBC: CPT | Performed by: INTERNAL MEDICINE

## 2017-06-05 PROCEDURE — 93308 TTE F-UP OR LMTD: CPT | Mod: 26 | Performed by: INTERNAL MEDICINE

## 2017-06-05 PROCEDURE — 76705 ECHO EXAM OF ABDOMEN: CPT

## 2017-06-05 PROCEDURE — 25000132 ZZH RX MED GY IP 250 OP 250 PS 637: Performed by: NURSE PRACTITIONER

## 2017-06-05 PROCEDURE — 40000225 ZZH STATISTIC SLP WARD VISIT: Performed by: SPEECH-LANGUAGE PATHOLOGIST

## 2017-06-05 PROCEDURE — 25000128 H RX IP 250 OP 636: Performed by: INTERNAL MEDICINE

## 2017-06-05 PROCEDURE — 83735 ASSAY OF MAGNESIUM: CPT | Performed by: STUDENT IN AN ORGANIZED HEALTH CARE EDUCATION/TRAINING PROGRAM

## 2017-06-05 PROCEDURE — 40000133 ZZH STATISTIC OT WARD VISIT: Performed by: OCCUPATIONAL THERAPIST

## 2017-06-05 PROCEDURE — 92526 ORAL FUNCTION THERAPY: CPT | Mod: GN | Performed by: SPEECH-LANGUAGE PATHOLOGIST

## 2017-06-05 PROCEDURE — 97162 PT EVAL MOD COMPLEX 30 MIN: CPT | Mod: GP | Performed by: PHYSICAL THERAPIST

## 2017-06-05 PROCEDURE — 83735 ASSAY OF MAGNESIUM: CPT | Performed by: INTERNAL MEDICINE

## 2017-06-05 PROCEDURE — 84100 ASSAY OF PHOSPHORUS: CPT | Performed by: STUDENT IN AN ORGANIZED HEALTH CARE EDUCATION/TRAINING PROGRAM

## 2017-06-05 PROCEDURE — 94640 AIRWAY INHALATION TREATMENT: CPT

## 2017-06-05 PROCEDURE — 93325 DOPPLER ECHO COLOR FLOW MAPG: CPT | Mod: 26 | Performed by: INTERNAL MEDICINE

## 2017-06-05 PROCEDURE — 40000193 ZZH STATISTIC PT WARD VISIT: Performed by: PHYSICAL THERAPIST

## 2017-06-05 PROCEDURE — 97530 THERAPEUTIC ACTIVITIES: CPT | Mod: GP | Performed by: PHYSICAL THERAPIST

## 2017-06-05 PROCEDURE — 85027 COMPLETE CBC AUTOMATED: CPT | Performed by: INTERNAL MEDICINE

## 2017-06-05 PROCEDURE — 99233 SBSQ HOSP IP/OBS HIGH 50: CPT | Mod: GC | Performed by: INTERNAL MEDICINE

## 2017-06-05 PROCEDURE — P9037 PLATE PHERES LEUKOREDU IRRAD: HCPCS | Performed by: STUDENT IN AN ORGANIZED HEALTH CARE EDUCATION/TRAINING PROGRAM

## 2017-06-05 PROCEDURE — 93005 ELECTROCARDIOGRAM TRACING: CPT

## 2017-06-05 PROCEDURE — 25500064 ZZH RX 255 OP 636: Performed by: INTERNAL MEDICINE

## 2017-06-05 PROCEDURE — 25000132 ZZH RX MED GY IP 250 OP 250 PS 637: Performed by: STUDENT IN AN ORGANIZED HEALTH CARE EDUCATION/TRAINING PROGRAM

## 2017-06-05 PROCEDURE — 27210437 ZZH NUTRITION PRODUCT SEMIELEM INTERMED LITER

## 2017-06-05 PROCEDURE — 97166 OT EVAL MOD COMPLEX 45 MIN: CPT | Mod: GO | Performed by: OCCUPATIONAL THERAPIST

## 2017-06-05 PROCEDURE — 25000125 ZZHC RX 250: Performed by: INTERNAL MEDICINE

## 2017-06-05 RX ORDER — POTASSIUM CHLORIDE 750 MG/1
20-40 TABLET, EXTENDED RELEASE ORAL
Status: DISCONTINUED | OUTPATIENT
Start: 2017-06-05 | End: 2017-06-16 | Stop reason: HOSPADM

## 2017-06-05 RX ORDER — FUROSEMIDE 10 MG/ML
40 INJECTION INTRAMUSCULAR; INTRAVENOUS ONCE
Status: COMPLETED | OUTPATIENT
Start: 2017-06-05 | End: 2017-06-05

## 2017-06-05 RX ORDER — POTASSIUM CL/LIDO/0.9 % NACL 10MEQ/0.1L
10 INTRAVENOUS SOLUTION, PIGGYBACK (ML) INTRAVENOUS
Status: DISCONTINUED | OUTPATIENT
Start: 2017-06-05 | End: 2017-06-16 | Stop reason: HOSPADM

## 2017-06-05 RX ORDER — POTASSIUM CHLORIDE 29.8 MG/ML
20 INJECTION INTRAVENOUS
Status: DISCONTINUED | OUTPATIENT
Start: 2017-06-05 | End: 2017-06-16 | Stop reason: HOSPADM

## 2017-06-05 RX ORDER — POTASSIUM CHLORIDE 20MEQ/15ML
40 LIQUID (ML) ORAL ONCE
Status: COMPLETED | OUTPATIENT
Start: 2017-06-05 | End: 2017-06-05

## 2017-06-05 RX ORDER — POTASSIUM CHLORIDE 1.5 G/1.58G
20-40 POWDER, FOR SOLUTION ORAL
Status: DISCONTINUED | OUTPATIENT
Start: 2017-06-05 | End: 2017-06-16 | Stop reason: HOSPADM

## 2017-06-05 RX ORDER — MAGNESIUM SULFATE HEPTAHYDRATE 40 MG/ML
4 INJECTION, SOLUTION INTRAVENOUS EVERY 4 HOURS PRN
Status: DISCONTINUED | OUTPATIENT
Start: 2017-06-05 | End: 2017-06-16 | Stop reason: HOSPADM

## 2017-06-05 RX ORDER — POTASSIUM CHLORIDE 7.45 MG/ML
10 INJECTION INTRAVENOUS
Status: DISCONTINUED | OUTPATIENT
Start: 2017-06-05 | End: 2017-06-16 | Stop reason: HOSPADM

## 2017-06-05 RX ADMIN — ACETAMINOPHEN 650 MG: 325 TABLET, FILM COATED ORAL at 13:21

## 2017-06-05 RX ADMIN — ACETAMINOPHEN 650 MG: 325 TABLET, FILM COATED ORAL at 03:58

## 2017-06-05 RX ADMIN — GABAPENTIN 200 MG: 250 SOLUTION ORAL at 13:21

## 2017-06-05 RX ADMIN — HYDROCORTISONE SODIUM SUCCINATE 25 MG: 100 INJECTION, POWDER, FOR SOLUTION INTRAMUSCULAR; INTRAVENOUS at 12:45

## 2017-06-05 RX ADMIN — LEVETIRACETAM 1000 MG: 100 SOLUTION ORAL at 08:04

## 2017-06-05 RX ADMIN — HYDROCORTISONE SODIUM SUCCINATE 25 MG: 100 INJECTION, POWDER, FOR SOLUTION INTRAMUSCULAR; INTRAVENOUS at 03:43

## 2017-06-05 RX ADMIN — SILVER SULFADIAZINE: 10 CREAM TOPICAL at 12:46

## 2017-06-05 RX ADMIN — GABAPENTIN 200 MG: 250 SOLUTION ORAL at 19:34

## 2017-06-05 RX ADMIN — DIGOXIN 125 MCG: 125 TABLET ORAL at 08:02

## 2017-06-05 RX ADMIN — GABAPENTIN 200 MG: 250 SOLUTION ORAL at 08:04

## 2017-06-05 RX ADMIN — IPRATROPIUM BROMIDE 0.5 MG: 0.5 SOLUTION RESPIRATORY (INHALATION) at 21:18

## 2017-06-05 RX ADMIN — PANTOPRAZOLE SODIUM 40 MG: 40 TABLET, DELAYED RELEASE ORAL at 08:03

## 2017-06-05 RX ADMIN — IPRATROPIUM BROMIDE 0.5 MG: 0.5 SOLUTION RESPIRATORY (INHALATION) at 16:20

## 2017-06-05 RX ADMIN — FOLIC ACID 1 MG: 1 TABLET ORAL at 08:02

## 2017-06-05 RX ADMIN — DEXTROSE MONOHYDRATE: 100 INJECTION, SOLUTION INTRAVENOUS at 09:42

## 2017-06-05 RX ADMIN — SODIUM PHOSPHATE, MONOBASIC, MONOHYDRATE AND SODIUM PHOSPHATE, DIBASIC, ANHYDROUS 10 MMOL: 276; 142 INJECTION, SOLUTION INTRAVENOUS at 06:53

## 2017-06-05 RX ADMIN — HUMAN INSULIN 3 UNITS/HR: 100 INJECTION, SOLUTION SUBCUTANEOUS at 09:10

## 2017-06-05 RX ADMIN — FUROSEMIDE 40 MG: 10 INJECTION, SOLUTION INTRAVENOUS at 13:17

## 2017-06-05 RX ADMIN — POTASSIUM CHLORIDE 20 MEQ: 29.8 INJECTION, SOLUTION INTRAVENOUS at 17:05

## 2017-06-05 RX ADMIN — IPRATROPIUM BROMIDE 0.5 MG: 0.5 SOLUTION RESPIRATORY (INHALATION) at 07:55

## 2017-06-05 RX ADMIN — POTASSIUM CHLORIDE 20 MEQ: 29.8 INJECTION, SOLUTION INTRAVENOUS at 22:28

## 2017-06-05 RX ADMIN — OYSTER SHELL CALCIUM WITH VITAMIN D 2 TABLET: 500; 200 TABLET, FILM COATED ORAL at 08:02

## 2017-06-05 RX ADMIN — MULTIVIT AND MINERALS-FERROUS GLUCONATE 9 MG IRON/15 ML ORAL LIQUID 15 ML: at 08:02

## 2017-06-05 RX ADMIN — ACETAMINOPHEN 650 MG: 325 TABLET, FILM COATED ORAL at 19:34

## 2017-06-05 RX ADMIN — IPRATROPIUM BROMIDE 0.5 MG: 0.5 SOLUTION RESPIRATORY (INHALATION) at 13:00

## 2017-06-05 RX ADMIN — SULFUR HEXAFLUORIDE 5 ML: KIT at 11:46

## 2017-06-05 RX ADMIN — Medication 2 G: at 21:00

## 2017-06-05 RX ADMIN — POTASSIUM CHLORIDE 40 MEQ: 40 SOLUTION ORAL at 09:42

## 2017-06-05 RX ADMIN — POTASSIUM CHLORIDE 20 MEQ: 29.8 INJECTION, SOLUTION INTRAVENOUS at 18:14

## 2017-06-05 RX ADMIN — POTASSIUM CHLORIDE 20 MEQ: 29.8 INJECTION, SOLUTION INTRAVENOUS at 05:38

## 2017-06-05 ASSESSMENT — PAIN DESCRIPTION - DESCRIPTORS
DESCRIPTORS: ACHING
DESCRIPTORS: SORE
DESCRIPTORS: ACHING;SORE
DESCRIPTORS: SORE

## 2017-06-05 NOTE — PLAN OF CARE
Problem: Goal Outcome Summary  Goal: Goal Outcome Summary     4E: OT/CR eval complete and tx initiated. Pt able to transfer up to EOB with mod-max A of 1; tolerated sitting EOB >10 min with min A (short bouts of sitting with CGA) due to R sided lean which pt needed frequent cuing and assistance to correct. Pt needed mod-max A of 2 to stand from EOB and transfer to chair. Pt with noted cognitive delay and concern for anoxic brain injury - will further assess in upcoming session. Pt reported greater sensation of weakness on R side (including R lean observed by writer) as well as visual deficits in L eye related to tracking though pt had difficulty describing visual deficits in further detail - RN and MDs informed. Pt with normal CV response to activity (O2 sats remained in upper 90s on 3L NC, HR up to 100 (from 85), pre /75, post /88).     Anticipate pt will need inpt rehab at discharge to increase ADL/IADL; likely will be a good ARU candidate due to high motivation and good family support.

## 2017-06-05 NOTE — PROGRESS NOTES
06/05/17 0823   General Information   Onset Date 05/29/17   Start of Care Date 06/05/17   Referring Physician Dr. Ezra Og   Patient Profile Review/OT: Additional Occupational Profile Info See Profile for full history and prior level of function   Patient/Family Goals Statement Patient is thirsty.   Swallowing Evaluation Bedside swallow evaluation   Behaviorial Observations Lethargic  (oriented to place, situation, month, year)   Mode of current nutrition NPO;NJ   Respiratory Status Intubated on (date);Extubated on (date);O2 Supply  (intubated 5/29-6/4/2017)   Type of O2 supply Nasal cannula  (note dyspnea on exertion)   Comments Mrs. Amelia Michel is a 56-year old lady with a PMHx of VSD repair (1969), RA, and a recent diagnosis of AFib/AFlutter/SVT who presented to [facility] on 05/29 after OOH cardiac arrest with shockable rhythm. CXR 5/29 revealed extensive consolidations right lower and left lower lobes and CXR 6/4 revealed unchanged bilateral airspace and interstitial opacities, pulmonary edema vs infection and slight increased small right pleural effusion.    Clinical Swallow Evaluation   Oral Musculature generally intact   Dentition present and adequate   Mucosal Quality adequate  (RN had recently done oral cares with moisturizer)   Mandibular Strength and Mobility intact   Oral Labial Strength and Mobility impaired coordination   Lingual Strength and Mobility impaired coordination  (note 2 areas of laceration)   Velar Elevation intact   Laryngeal Function Cough;Throat clear;Swallow;Voicing initiated  (soft voice; mildly weak cough effort)   Clinical Swallow Eval: Thin Liquid Texture Trial   Mode of Presentation, Thin Liquids spoon;cup;fed by clinician   Volume of Liquid or Food Presented 3 ice chips, 2 sips water by spoon, 1 sip water by cup   Oral Phase of Swallow Poor AP movement;Premature pharyngeal entry  (with large sip by cup)   Pharyngeal Phase of Swallow coughing/choking;repeated swallows;reduction  in laryngeal movement  (immediate coughing after large sip by cup)   Diagnostic Statement High risk for aspiration   Clinical Swallow Eval: Nectar Thick Liquid Texture Trial   Mode of Presentation, Nectar spoon;fed by clinician   Volume of Nectar Presented 3 sips   Oral Phase, Nectar Poor AP movement   Pharyngeal Phase, Nectar reduction in laryngeal movement;repeated swallows  (swallow incoordination)   Diagnostic Statement Risk for aspiration   Swallow Eval: Clinical Impressions   Skilled Criteria for Therapy Intervention Skilled criteria met.  Treatment indicated.   Functional Assessment Scale (FAS) 2   Treatment Diagnosis moderate-severe oropharyngeal dysphagia   Diet texture recommendations NPO  (except 6-8 ice chips per hour with RN assist only)   Recommended Feeding/Eating Techniques maintain upright posture during/after eating for 30 mins   Demonstrates Need for Referral to Another Service (involved)   Therapy Frequency daily   Predicted Duration of Therapy Intervention (days/wks) 2-3 weeks   Anticipated Discharge Disposition inpatient rehabilitation facility   Risks and Benefits of Treatment have been explained. Yes   Patient, family and/or staff in agreement with Plan of Care Yes   Clinical Impression Comments Patient presents with moderate-severe oropharyngeal dysphagia characterized by reduced oral bolus control, delayed swallow response, reduced hyolaryngeal elevation, incoordinated swallow response and immediate coughing after larger sip of water by cup. No overt aspiration signs occurred with limited trials of ice chips, sips water and nectar thick liquid by spoon. However, note swallow incoordination with repeat swallows. Given generalized weakness and lethargy, dyspnea on exertion and clinical signs of aspiration with sip of water, recommend remain NPO at this time with continued alternate nutrition/hydration/medication. Please continue thorough oral cares. Patient may have limited quantity of ice  chips (6-8 per hour) with RN assist for comfort as tolerated. Will continue to follow daily for p.o. readiness.   Total Evaluation Time   Total Evaluation Time (Minutes) 15

## 2017-06-05 NOTE — PROGRESS NOTES
06/05/17 1159   Quick Adds   Type of Visit Initial Occupational Therapy Evaluation   Living Environment   Lives With spouse   Living Arrangements house  (1 level)   Home Accessibility stairs to enter home   Number of Stairs to Enter Home 2   Number of Stairs Within Home 0   Stair Railings at Home none   Transportation Available car;family or friend will provide   Living Environment Comment Pt's spouse works outside the home but is taking some time off to assist pt   Self-Care   Dominant Hand right   Usual Activity Tolerance good   Current Activity Tolerance fair   Regular Exercise no   Equipment Currently Used at Home none   Activity/Exercise/Self-Care Comment Pt does not complete formal exercise but tries to stay active with daily activity (completes ~8,000 steps on fit bit)   Functional Level Prior   Ambulation 0-->independent   Transferring 0-->independent   Toileting 0-->independent   Bathing 0-->independent   Dressing 0-->independent   Eating 0-->independent   Communication 0-->understands/communicates without difficulty   Swallowing 0-->swallows foods/liquids without difficulty   Cognition 0 - no cognition issues reported   Fall history within last six months yes   Number of times patient has fallen within last six months 1   Which of the above functional risks had a recent onset or change? ambulation;transferring;toileting;bathing;dressing;cognition;swallowing;communication/speech;fall history   Prior Functional Level Comment Pt was (I) with all ADL/IADLs including driving and RN (not pt care; does documentation review/recommendations for insurance purposes; desk job)   General Information   Onset of Illness/Injury or Date of Surgery - Date 05/29/17   Referring Physician Gilbert Og MD   Additional Occupational Profile Info/Pertinent History of Current Problem Pt is a 56-year old lady with a PMHx of VSD repair (1969), RA, and a recent diagnosis of AFib/AFlutter/SVT who presented to [facility] on 05/29 after OOH  cardiac arrest with shockable rhythm. After ROSC in the field, she was transferred to [facility] for further management   Precautions/Limitations fall precautions   Weight-Bearing Status - LUE full weight-bearing   Weight-Bearing Status - RUE full weight-bearing   Weight-Bearing Status - LLE full weight-bearing   Weight-Bearing Status - RLE full weight-bearing   Heart Disease Risk Factors High blood pressure;Medical history   Cognitive Status Examination   Orientation orientation to person, place and time   Level of Consciousness alert   Able to Follow Commands success, 1-step commands   Personal Safety (Cognitive) mild impairment   Memory impaired   Attention Sustained attention impaired   Cognitive Comment to be further assessed due to suspected anoxic brain injury (per chart)   Visual Perception   Visual Perception Wears glasses   Visual Perception Comments Pt reports new difficulty with vision since cardiac arrest however she has difficulty describing and states it is intermittent; pt reported having trouble tracking with L eye yesterday however eyes appear to track normally today - will continue to further assess as needed   Sensory Examination   Sensory Quick Adds Other (describe)   Sensory Comments Pt has neuropathy in B feet R>L; pt has decreased sensation in median nerve region of R hand (had CTS)   Pain Assessment   Patient Currently in Pain Yes, see Vital Sign flowsheet   Integumentary/Edema   Integumentary/Edema Comments Pt with generalized edema in BUE and BLEs   Range of Motion (ROM)   ROM Comment R shoulder AROM ~35 degrees (PROM WFL); remainder of RUE WFL, R hand limited by edema; L shoulder AROM ~80 degrees (PROM WFL), L elbow AROM to 90 degrees flexion, full extension (PROM WFL); L hand limited by edema   Strength   Strength Comments Pt reports she does not typically have difficulty raising UEs overhead; reports she is having new R shoulder pain (per MD likely related to CPR)   Mobility   Bed Mobility  Bed mobility skill: Supine to sit;Bed mobility skill: Scooting/Bridging;Bed mobility skill: Rolling/Turning   Bed Mobility Skill: Rolling/Turning   Level of [place] - Bed Mobility Skill Rolling Turning moderate assist (50% patients effort)   Physical Assist/Nonphysical Assist verbal cues;1 person assist   Bed Mobility Skill: Scooting/Bridging   Level of [place]: Scooting/Bridging maximum assist (25% patients effort)   Physical Assist/Nonphysical Assist: Scooting/Bridging verbal cues;1 person assist   Bed Mobility Skill: Supine to Sit   Level of [place]: Supine/Sit maximum assist (25% patients effort)   Physical Assist/Nonphysical Assist: Supine/Sit verbal cues;1 person assist   Transfer Skill: Sit to Stand   Level of [place]: Sit/Stand moderate assist (50% patients effort)   Physical Assist/Nonphysical Assist: Sit/Stand verbal cues;2 persons   Lower Body Dressing   Level of [place]: Dress Lower Body unable to perform   Activities of Daily Living Analysis   Impairments Contributing to Impaired Activities of Daily Living balance impaired;cognition impaired;coordination impaired;fear and anxiety;pain;postural control impaired;ROM decreased;sensation decreased;sensory feedback impaired;strength decreased   General Therapy Interventions   Planned Therapy Interventions ADL retraining;IADL retraining;bed mobility training;cognition;fine motor coordination training;motor coordination training;neuromuscular re-education;orthotic fitting/training;ROM;strengthening;stretching;transfer training;visual perception;home program guidelines;progressive activity/exercise;risk factor education   Clinical Impression   Criteria for Skilled Therapeutic Interventions Met yes, treatment indicated   OT Diagnosis decrease ADL/IADL (I)   Influenced by the following impairments weakness, cognition, balance, decreased ROM   Assessment of Occupational Performance 5 or more Performance Deficits   Identified Performance  "Deficits functional transfers, g/h, toileting, dressing, bathing, cooking, cleaning, medication and financial management, driving, working   Clinical Decision Making (Complexity) Moderate complexity   Therapy Frequency other (see comments)  (6x/week)   Predicted Duration of Therapy Intervention (days/wks) 14 days   Anticipated Equipment Needs at Discharge (TBD pending progress)   Anticipated Discharge Disposition Home with Home Therapy;Home with Outpatient Therapy;Transitional Care Facility;Acute Rehabilitation Facility   Risks and Benefits of Treatment have been explained. Yes   Patient, Family & other staff in agreement with plan of care Yes   [facility] TM \"6 Clicks\"   2016, Trustees of [facility], under license to [employer].  All rights reserved.   6 Clicks Short Forms Daily Activity Inpatient Short Form   [facility]  \"6 Clicks\" Daily Activity Inpatient Short Form   1. Putting on and taking off regular lower body clothing? 1 - Total   2. Bathing (including washing, rinsing, drying)? 2 - A Lot   3. Toileting, which includes using toilet, bedpan or urinal? 2 - A Lot   4. Putting on and taking off regular upper body clothing? 2 - A Lot   5. Taking care of personal grooming such as brushing teeth? 2 - A Lot   6. Eating meals? 1 - Total   Daily Activity Raw Score (Score out of 24.Lower scores equate to lower levels of function) 10   Total Evaluation Time   Total Evaluation Time (Minutes) 12     "

## 2017-06-05 NOTE — PLAN OF CARE
Problem: Goal Outcome Summary  Goal: Goal Outcome Summary  Outcome: Therapy, progress toward functional goals as expected  SLP: Clinical swallow evaluation completed per MD order. Patient presents with moderate-severe oropharyngeal dysphagia characterized by reduced oral bolus control, delayed swallow response, reduced hyolaryngeal elevation, incoordinated swallow response and immediate coughing after larger sip of water by cup. No overt aspiration signs occurred with limited trials of ice chips, sips water and nectar thick liquid by spoon. However, note swallow incoordination with repeat swallows. Given generalized weakness and lethargy, dyspnea on exertion and clinical signs of aspiration with sip of water, recommend remain NPO at this time with continued alternate nutrition/hydration/medication. Please continue thorough oral cares. Patient may have limited quantity of ice chips (6-8 per hour) with RN assist for comfort as tolerated. Consulted with RN. Will continue to follow daily for p.o. Readiness. Recommend acute rehab at discharge.

## 2017-06-05 NOTE — PLAN OF CARE
Problem: Goal Outcome Summary  Goal: Goal Outcome Summary  Neuro: a/o x4, generalized weakness, up in chair with max assist 2 people, returned to bed with lift. c/o right shoulder pain, leans to the right when sitting up at edge of bed, intermittently c/o blurry left vision- Dr. Ca aware. LEIGH ANN, denies numbness/tingling, occasional burning in legs from neuropathy     CV: AFib, rate 90s-100s (up to 150s x1 with bed to chair activity). Diastolic/MAPs trending up- CSI team aware, will continue to monitor and trend. 2-3+ edema, 40 mg Lasix given.     Pulm: 2L NC, orthopnea and dyspneic on exertion.      GI: 3 liquid large stools this am, rectal pouch applied. NJ ttube with tube feeds at 40 ml/hr-goal. Speech ok'd for ice chips only.     : brand, urine output ~50-75/hr yellow. Diuresed 1500mL     Skin: intact, bruising noted on right upper back; buttocks reddened, blanchable, no excoriation or open sores.     3 packs platelets transfused. K+ 3.1 replaced. Abdominal ultrasound done. PT and OT worked with pt today.  and pt updated by RN and MD's throughout shift.

## 2017-06-05 NOTE — PROGRESS NOTES
Patient's vitals remained stable throughout shift. Alert and oriented but did have hallucinations overnight in which she acknowledged. MAp >65, HR 90s-low 100s, Afib, SpO2 >97%, 4 L NC, afebrile, Urine output 30-40 cc/hr. TPM off but connected if HR<70. Insulin @ 2 units/hr. Soft, loose stool leaked around rectal pouch which was d/c'd. Electrolytes replaced per protocol. Platelets came back at 15 this am- rechecked per order and came back as 16. Order to give 1 unit platelets. Plan is to have ICD before discharge. Will continue to monitor patient closely and update MDs as needed.

## 2017-06-05 NOTE — PROGRESS NOTES
06/05/17 1353   Quick Adds   Type of Visit Initial PT Evaluation   Living Environment   Lives With spouse   Living Arrangements house   Home Accessibility stairs to enter home   Number of Stairs to Enter Home 2   Number of Stairs Within Home 0   Stair Railings at Home none   Transportation Available car;family or friend will provide   Living Environment Comment Pt's spouse is able to take time off to assist pt as needed   Self-Care   Dominant Hand right   Usual Activity Tolerance good   Current Activity Tolerance fair   Regular Exercise yes  (regular walking, informal ex: ~8000 steps/day w/[employer])   Activity/Exercise Type walking   Exercise Amount/Frequency daily   Equipment Currently Used at Home (4WW at home, has not recently used)   Activity/Exercise/Self-Care Comment not recently seen by lymphedema, but required in mos past   Functional Level Prior   Ambulation 0-->independent   Transferring 0-->independent   Toileting 0-->independent   Bathing 0-->independent   Dressing 0-->independent   Eating 0-->independent   Communication 0-->understands/communicates without difficulty   Swallowing 0-->swallows foods/liquids without difficulty   Cognition 0 - no cognition issues reported   Fall history within last six months yes   Number of times patient has fallen within last six months 1   Prior Functional Level Comment independent mobility prior to admission but reports due to May 2017 hospitalization moving without AD but slowly   General Information   Onset of Illness/Injury or Date of Surgery - Date 05/29/17   Referring Physician Gilbert Og MD   Patient/Family Goals Statement To get strong. Per spouse pt is strong willed and determined to recover. Hope is full recovery.    Pertinent History of Current Problem (include personal factors and/or comorbidities that impact the POC) Mrs. Amelia Michel is a 56-year old lady with a PMHx of VSD repair (1969), RA, and a recent diagnosis of AFib/AFlutter/SVT who presented to  "[facility] on 05/29 after OOH cardiac arrest with shockable rhythm. After ROSC in the field, she was transferred to [facility] for further management. Also pt recently at [facility] 5-15 to 5-26 and experienced deconditioning concerns with last hospitalization.    Precautions/Limitations fall precautions   General Info Comments activity: up ad emelia   Cognitive Status Examination   Level of Consciousness alert   Follows Commands and Answers Questions 100% of the time   Personal Safety and Judgment intact   Cognitive Comment per pt \"I love therapy\"   Pain Assessment   Patient Currently in Pain Yes, see Vital Sign flowsheet  (R shoulder pain x 2 days)   Posture    Posture Comments reduced postural control    Range of Motion (ROM)   ROM Comment less than full AROM in UEs and LEs currently; hip flexion AROM to 30 deg, abd to ~ 20 deg B; R shoulder ROM very limited (PROM or AROM) due to pain/guarding currently   Strength   Strength Comments 2-/5 hip abd/add, 2/5 knee ext/flex, 2-/5 ankle DF B   Bed Mobility   Bed Mobility Comments total assist x 2 supine<>sit as pt requires > 75% assist   Transfer Skills   Transfer Comments pt unable to perform   Gait   Gait Comments unable to test due to pt's degree of weakness   Balance   Balance Comments impaired trunk control in sitting, requires MODA to steady x 1-2   Sensory Examination   Sensory Perception Comments history of neuropathy, foot surgeries and reduced sensation; no new deficits reported   Coordination   Coordination Comments follows all command appropriately   General Therapy Interventions   Planned Therapy Interventions bed mobility training;gait training;neuromuscular re-education;ROM;strengthening;stretching;transfer training;home program guidelines;progressive activity/exercise   Clinical Impression   Criteria for Skilled Therapeutic Intervention yes, treatment indicated   PT Diagnosis weakness and deconditioning   Influenced by the following impairments reduced ROM, strength, " "balance, activity tolerance   Functional limitations due to impairments below baseline bed mobility, lacking strength to transfer, ambulate   Clinical Presentation Evolving/Changing   Clinical Presentation Rationale requiring ICU cares, recovering from major medical event   Clinical Decision Making (Complexity) Moderate complexity   Therapy Frequency` daily   Predicted Duration of Therapy Intervention (days/wks) 1 week   Anticipated Discharge Disposition Acute Rehabilitation Facility   Risk & Benefits of therapy have been explained Yes   Patient, Family & other staff in agreement with plan of care Yes   [facility] \"6 Clicks\"   2016, Trustees of [facility], under license to [employer].  All rights reserved.   6 Clicks Short Forms Basic Mobility Inpatient Short Form   [facility] AM-PAC  \"6 Clicks\" V.2 Basic Mobility Inpatient Short Form   1. Turning from your back to your side while in a flat bed without using bedrails? 2 - A Lot   2. Moving from lying on your back to sitting on the side of a flat bed without using bedrails? 2 - A Lot   3. Moving to and from a bed to a chair (including a wheelchair)? 1 - Total   4. Standing up from a chair using your arms (e.g., wheelchair, or bedside chair)? 1 - Total   5. To walk in hospital room? 1 - Total   6. Climbing 3-5 steps with a railing? 1 - Total   Basic Mobility Raw Score (Score out of 24.Lower scores equate to lower levels of function) 8   Total Evaluation Time   Total Evaluation Time (Minutes) 10     "

## 2017-06-05 NOTE — PROGRESS NOTES
Social Work Services Progress Note    Hospital Day: 8  Date of Initial Social Work Evaluation:  5/31/17  Collaborated with:  Patient's  CAMILLE Walters, team rounds    Data:    Pt remains in ICU and continues to make daily improvements.  Pt was extubated over the weekend.    Intervention:    Received email from pt's  Romeo on Friday providing  with FMLA paperwork for pt's employer ([employer]).  Handed paperwork to Valery on CSI team today; requested that she return it to  for faxing once completed.    Assessment:   Unable to assess today.    Plan:    Anticipated Disposition:  TBD pending medical course, but today's PT assessment states possible ARU candidate pending progress.    Barriers to d/c plan:  Pt is not medically stable.    Follow Up:  Will plan to f/u with CSI re: FMLA paperwork on Tuesday.  Will f/u with pt's  Romeo as well.    ANDIE Abbott, [facility]  Adult ICU Clinical   Pager [phone]

## 2017-06-05 NOTE — PROGRESS NOTES
CARDIOLOGY CSI PROGRESS NOTE    SUBJECTIVE:  Extubated yesterday. Required one dose of furosemide 40 mg IV for volume overload and mild respiratory distress yesterday afternoon after extubation. Improvement in dyspnea followed. Was transfused one unit pRBC this morning.     This morning, feeling a little dyspneic. Also with some RUQ pain.     ROS otherwise negative.    OBJECTIVE:  Vital signs:  124/100 (Range 126-173/52-96)  RR 18 (9-29)   () Tele with occasional ectopics and pacing with lack of capture   Tm 98.5   164 lbs 3.88 oz (weight 06/04 168)    I/O: -466 mL  Intake: 2470 in (960 enteral, 1080 IV, 430 NG)  Output: 2937 (2787 urine, 150 stool)      PHYSICAL EXAM:  Gen: Soemwhat tachypneic lying flat  HEENT: MMM, RIJV pacer  Resp: Tachypneic with crackles anteriorly  CVS: JVP to the mid-neck, S1+S2 without added sounds or murmurs  Abdo: RUQ pain without peritonism, +BS  Extremities:  Warm, well-perfused, right groin with some bruising but without bruit/thrill  Neuro: GCS [id number]     Recent Labs   Lab Test  06/05/17   0531  06/05/17   0354   06/01/17   0342   HGB  8.6*  8.4*   < >  8.1*   HCT  27.1*  25.9*   < >  23.5*   WBC  3.6*  3.3*   < >  5.9   MCV  105*  104*   < >  100   MCH  33.3*  33.6*   < >  34.5*   MCHC  31.7  32.4   < >  34.5   RDW  19.2*  19.5*   < >  22.3*   PLT  15*  15*   < >  43*   NA   --   151*   < >  147*   POTASSIUM   --   3.5   < >  3.7   CHLORIDE   --   111*   < >  109   CO2   --   34*   < >  32   BUN   --   39*   < >  55*   CR   --   0.79   < >  1.75*   GLC   --   134*   < >  124*   VIKTORIYA   --   7.6*   < >  7.9*   ALBUMIN   --    --    --   1.9*   BILITOTAL   --    --    --   2.6*   ALKPHOS   --    --    --   125   AST   --    --    --   597*   ALT   --    --    --   891*    < > = values in this interval not displayed.       Micro:  Nil new    Imaging:  Nil new    Cardiac meds: Digoxin 125 micrograms q24h  Non-cardiac meds: Calcium carbonate 1250 mg/vitamin D, folic acid 1 mg  q24h, gabapentin 200 mg TID, hydrocortisone taper, levetiracetam 1000 mg BID, multivitamins, senna-docusate  Drips:  Nil    ASSESSMENT/PLAN:  Mrs. Amelia Michel is a 56-year old lady with a PMHx of VSD repair (1969), RA, and a recent diagnosis of AFib/AFlutter/SVT who presented to [facility] on 05/29 after OOH cardiac arrest with shockable rhythm. After ROSC in the field, she was transferred to [facility] for further management. Extubated 06/04.     - Severe thrombocytopenia   - Likely a combination of severe liver injury as well as drug-induced (vancomycin during this admission and heparin during last admission)   - Continue folate supplementation   - One unit platelets to increase >20K    - Repeat liver enzymes   - Hematology consultation   - HIT panel     - RUQ abdo pain   - RUQ US    - Hypoxic respiratory failure   - s/p 7-day course of meropenem/ertapenem (05/30-06/04)   - Furosemide IV, if needed, after extubation to overcome loss of PEEP and net positive balance for last 48h   - Using non-adrenergic nebs only in view of possible SVT/AFib Hx     - Out-of-hospital cardiac arrest and cardiogenic shock; Hx of AFib/Aflutter/SVT; sinus arrest   - Continue digoxin 0.125 mg q24h    - No BB or CCB for now    - Ablation vs. AICD placement likely to be deferred at present due to thrombocytopenia    - AICD prior to discharge   - Repeat TTE    - Severe critical illness myopathy   - OT/PT   - Swallow study        - RA   - Weaning of stress dosed steroids ongoing (will complete on 06/04)   - Hold immunosuppressants      - Seizure   - Levetiracetam discontinued 06/05    - Ischemic liver injury complicated by coagulopathy   - Feeding via NGT   - Monitor liver enzymes   - Monitor for signs of bleeding    - Hypoglycemia; hypovolemic hypernatremia   - Maintain stable feeds in view of net positive intake on 06/03    - Insulin infusion    - NGT feeding    Chronic/inactive issues:  - ESBL UTI, aspiration pneumonia: s/p meropenem/ertapenem  course  - CELSA: Baseline Cr 0.7-0.8; peak Cr 1.9    Staffed with Dr. Wood.    Gilbert Og  Cardiology Fellow  Pager: [phone]

## 2017-06-05 NOTE — PLAN OF CARE
Problem: Goal Outcome Summary  Goal: Goal Outcome Summary  PT 4E: PT eval complete, treatment initiated. Pt performed AROM LE ex. Weakness noted more so proximally in LEs than distally. Less than full hip AROM observed. Pt required total assist x 2 supine<>sit, sat at edge of bed with MODA due to reduced trunk control. Pt is highly motivated and was independent prior to admission, but still recovering in stamina from prior week + hospitalization last month. Now much weaker due to current admission concerns.     Given strong motivation to work with therapy and return to baseline functional level, pt may be a good ARU candidate.

## 2017-06-06 ENCOUNTER — APPOINTMENT (OUTPATIENT)
Dept: OCCUPATIONAL THERAPY | Facility: CLINIC | Age: 57
DRG: 260 | End: 2017-06-06
Attending: NURSE PRACTITIONER
Payer: COMMERCIAL

## 2017-06-06 ENCOUNTER — APPOINTMENT (OUTPATIENT)
Dept: SPEECH THERAPY | Facility: CLINIC | Age: 57
DRG: 260 | End: 2017-06-06
Attending: NURSE PRACTITIONER
Payer: COMMERCIAL

## 2017-06-06 ENCOUNTER — APPOINTMENT (OUTPATIENT)
Dept: ULTRASOUND IMAGING | Facility: CLINIC | Age: 57
DRG: 260 | End: 2017-06-06
Attending: NURSE PRACTITIONER
Payer: COMMERCIAL

## 2017-06-06 ENCOUNTER — APPOINTMENT (OUTPATIENT)
Dept: PHYSICAL THERAPY | Facility: CLINIC | Age: 57
DRG: 260 | End: 2017-06-06
Attending: NURSE PRACTITIONER
Payer: COMMERCIAL

## 2017-06-06 LAB
ANION GAP SERPL CALCULATED.3IONS-SCNC: 4 MMOL/L (ref 3–14)
ANION GAP SERPL CALCULATED.3IONS-SCNC: 4 MMOL/L (ref 3–14)
BACTERIA SPEC CULT: NO GROWTH
BUN SERPL-MCNC: 39 MG/DL (ref 7–30)
BUN SERPL-MCNC: 40 MG/DL (ref 7–30)
CALCIUM SERPL-MCNC: 7.4 MG/DL (ref 8.5–10.1)
CALCIUM SERPL-MCNC: 7.5 MG/DL (ref 8.5–10.1)
CHLORIDE SERPL-SCNC: 106 MMOL/L (ref 94–109)
CHLORIDE SERPL-SCNC: 107 MMOL/L (ref 94–109)
CO2 SERPL-SCNC: 36 MMOL/L (ref 20–32)
CO2 SERPL-SCNC: 36 MMOL/L (ref 20–32)
CREAT SERPL-MCNC: 0.78 MG/DL (ref 0.52–1.04)
CREAT SERPL-MCNC: 0.84 MG/DL (ref 0.52–1.04)
ERYTHROCYTE [DISTWIDTH] IN BLOOD BY AUTOMATED COUNT: 19 % (ref 10–15)
ERYTHROCYTE [DISTWIDTH] IN BLOOD BY AUTOMATED COUNT: 19.2 % (ref 10–15)
GFR SERPL CREATININE-BSD FRML MDRD: 70 ML/MIN/1.7M2
GFR SERPL CREATININE-BSD FRML MDRD: 77 ML/MIN/1.7M2
GLUCOSE BLDC GLUCOMTR-MCNC: 110 MG/DL (ref 70–99)
GLUCOSE BLDC GLUCOMTR-MCNC: 115 MG/DL (ref 70–99)
GLUCOSE BLDC GLUCOMTR-MCNC: 117 MG/DL (ref 70–99)
GLUCOSE BLDC GLUCOMTR-MCNC: 124 MG/DL (ref 70–99)
GLUCOSE BLDC GLUCOMTR-MCNC: 140 MG/DL (ref 70–99)
GLUCOSE BLDC GLUCOMTR-MCNC: 150 MG/DL (ref 70–99)
GLUCOSE BLDC GLUCOMTR-MCNC: 152 MG/DL (ref 70–99)
GLUCOSE BLDC GLUCOMTR-MCNC: 190 MG/DL (ref 70–99)
GLUCOSE SERPL-MCNC: 138 MG/DL (ref 70–99)
GLUCOSE SERPL-MCNC: 144 MG/DL (ref 70–99)
HCT VFR BLD AUTO: 25 % (ref 35–47)
HCT VFR BLD AUTO: 26 % (ref 35–47)
HGB BLD-MCNC: 8.1 G/DL (ref 11.7–15.7)
HGB BLD-MCNC: 8.2 G/DL (ref 11.7–15.7)
INR PPP: 1.27 (ref 0.86–1.14)
INTERPRETATION ECG - MUSE: NORMAL
LDH SERPL L TO P-CCNC: 313 U/L (ref 81–234)
MAGNESIUM SERPL-MCNC: 2 MG/DL (ref 1.6–2.3)
MAGNESIUM SERPL-MCNC: 2.4 MG/DL (ref 1.6–2.3)
MCH RBC QN AUTO: 32.9 PG (ref 26.5–33)
MCH RBC QN AUTO: 34 PG (ref 26.5–33)
MCHC RBC AUTO-ENTMCNC: 31.5 G/DL (ref 31.5–36.5)
MCHC RBC AUTO-ENTMCNC: 32.4 G/DL (ref 31.5–36.5)
MCV RBC AUTO: 104 FL (ref 78–100)
MCV RBC AUTO: 105 FL (ref 78–100)
MICRO REPORT STATUS: NORMAL
PF4 HEPARIN CMPLX AB SER QL: NORMAL
PHOSPHATE SERPL-MCNC: 1.7 MG/DL (ref 2.5–4.5)
PHOSPHATE SERPL-MCNC: 2.1 MG/DL (ref 2.5–4.5)
PHOSPHATE SERPL-MCNC: 2.6 MG/DL (ref 2.5–4.5)
PLATELET # BLD AUTO: 41 10E9/L (ref 150–450)
PLATELET # BLD AUTO: 49 10E9/L (ref 150–450)
POTASSIUM SERPL-SCNC: 3.5 MMOL/L (ref 3.4–5.3)
POTASSIUM SERPL-SCNC: 3.6 MMOL/L (ref 3.4–5.3)
POTASSIUM SERPL-SCNC: 4.1 MMOL/L (ref 3.4–5.3)
RBC # BLD AUTO: 2.38 10E12/L (ref 3.8–5.2)
RBC # BLD AUTO: 2.49 10E12/L (ref 3.8–5.2)
RESULT: NORMAL
SEND OUTS MISC TEST CODE: NORMAL
SEND OUTS MISC TEST SPECIMEN: NORMAL
SODIUM SERPL-SCNC: 146 MMOL/L (ref 133–144)
SODIUM SERPL-SCNC: 146 MMOL/L (ref 133–144)
SPECIMEN SOURCE: NORMAL
TEST NAME: NORMAL
WBC # BLD AUTO: 3 10E9/L (ref 4–11)
WBC # BLD AUTO: 3.3 10E9/L (ref 4–11)

## 2017-06-06 PROCEDURE — 97530 THERAPEUTIC ACTIVITIES: CPT | Mod: GP | Performed by: PHYSICAL THERAPIST

## 2017-06-06 PROCEDURE — 84132 ASSAY OF SERUM POTASSIUM: CPT | Performed by: STUDENT IN AN ORGANIZED HEALTH CARE EDUCATION/TRAINING PROGRAM

## 2017-06-06 PROCEDURE — 97530 THERAPEUTIC ACTIVITIES: CPT | Mod: GO | Performed by: OCCUPATIONAL THERAPIST

## 2017-06-06 PROCEDURE — 84100 ASSAY OF PHOSPHORUS: CPT | Performed by: STUDENT IN AN ORGANIZED HEALTH CARE EDUCATION/TRAINING PROGRAM

## 2017-06-06 PROCEDURE — 25000125 ZZHC RX 250: Performed by: STUDENT IN AN ORGANIZED HEALTH CARE EDUCATION/TRAINING PROGRAM

## 2017-06-06 PROCEDURE — 40000133 ZZH STATISTIC OT WARD VISIT: Performed by: OCCUPATIONAL THERAPIST

## 2017-06-06 PROCEDURE — 40000225 ZZH STATISTIC SLP WARD VISIT: Performed by: SPEECH-LANGUAGE PATHOLOGIST

## 2017-06-06 PROCEDURE — 20000004 ZZH R&B ICU UMMC

## 2017-06-06 PROCEDURE — 25000131 ZZH RX MED GY IP 250 OP 636 PS 637: Performed by: STUDENT IN AN ORGANIZED HEALTH CARE EDUCATION/TRAINING PROGRAM

## 2017-06-06 PROCEDURE — 25000128 H RX IP 250 OP 636: Performed by: INTERNAL MEDICINE

## 2017-06-06 PROCEDURE — 80048 BASIC METABOLIC PNL TOTAL CA: CPT | Performed by: INTERNAL MEDICINE

## 2017-06-06 PROCEDURE — 40000193 ZZH STATISTIC PT WARD VISIT: Performed by: PHYSICAL THERAPIST

## 2017-06-06 PROCEDURE — 94640 AIRWAY INHALATION TREATMENT: CPT

## 2017-06-06 PROCEDURE — 84100 ASSAY OF PHOSPHORUS: CPT | Performed by: INTERNAL MEDICINE

## 2017-06-06 PROCEDURE — 97602 WOUND(S) CARE NON-SELECTIVE: CPT

## 2017-06-06 PROCEDURE — 99232 SBSQ HOSP IP/OBS MODERATE 35: CPT | Mod: 25 | Performed by: INTERNAL MEDICINE

## 2017-06-06 PROCEDURE — 25000132 ZZH RX MED GY IP 250 OP 250 PS 637: Performed by: NURSE PRACTITIONER

## 2017-06-06 PROCEDURE — 97535 SELF CARE MNGMENT TRAINING: CPT | Mod: GO | Performed by: OCCUPATIONAL THERAPIST

## 2017-06-06 PROCEDURE — 40000275 ZZH STATISTIC RCP TIME EA 10 MIN

## 2017-06-06 PROCEDURE — 85610 PROTHROMBIN TIME: CPT | Performed by: INTERNAL MEDICINE

## 2017-06-06 PROCEDURE — 83615 LACTATE (LD) (LDH) ENZYME: CPT | Performed by: INTERNAL MEDICINE

## 2017-06-06 PROCEDURE — 25000132 ZZH RX MED GY IP 250 OP 250 PS 637: Performed by: INTERNAL MEDICINE

## 2017-06-06 PROCEDURE — 97140 MANUAL THERAPY 1/> REGIONS: CPT | Mod: GO | Performed by: OCCUPATIONAL THERAPIST

## 2017-06-06 PROCEDURE — 27210437 ZZH NUTRITION PRODUCT SEMIELEM INTERMED LITER

## 2017-06-06 PROCEDURE — 25000125 ZZHC RX 250: Performed by: INTERNAL MEDICINE

## 2017-06-06 PROCEDURE — 99232 SBSQ HOSP IP/OBS MODERATE 35: CPT | Mod: GC | Performed by: INTERNAL MEDICINE

## 2017-06-06 PROCEDURE — 85027 COMPLETE CBC AUTOMATED: CPT | Performed by: INTERNAL MEDICINE

## 2017-06-06 PROCEDURE — 97110 THERAPEUTIC EXERCISES: CPT | Mod: GO | Performed by: OCCUPATIONAL THERAPIST

## 2017-06-06 PROCEDURE — 97110 THERAPEUTIC EXERCISES: CPT | Mod: GP | Performed by: PHYSICAL THERAPIST

## 2017-06-06 PROCEDURE — 94640 AIRWAY INHALATION TREATMENT: CPT | Mod: 76

## 2017-06-06 PROCEDURE — 92526 ORAL FUNCTION THERAPY: CPT | Mod: GN | Performed by: SPEECH-LANGUAGE PATHOLOGIST

## 2017-06-06 PROCEDURE — 83735 ASSAY OF MAGNESIUM: CPT | Performed by: INTERNAL MEDICINE

## 2017-06-06 PROCEDURE — 93930 UPPER EXTREMITY STUDY: CPT

## 2017-06-06 PROCEDURE — 25000128 H RX IP 250 OP 636: Performed by: STUDENT IN AN ORGANIZED HEALTH CARE EDUCATION/TRAINING PROGRAM

## 2017-06-06 PROCEDURE — 25000128 H RX IP 250 OP 636

## 2017-06-06 PROCEDURE — 99212 OFFICE O/P EST SF 10 MIN: CPT

## 2017-06-06 PROCEDURE — 99221 1ST HOSP IP/OBS SF/LOW 40: CPT | Mod: GC | Performed by: INTERNAL MEDICINE

## 2017-06-06 PROCEDURE — 83735 ASSAY OF MAGNESIUM: CPT | Performed by: STUDENT IN AN ORGANIZED HEALTH CARE EDUCATION/TRAINING PROGRAM

## 2017-06-06 PROCEDURE — 00000146 ZZHCL STATISTIC GLUCOSE BY METER IP

## 2017-06-06 RX ORDER — FUROSEMIDE 10 MG/ML
60 INJECTION INTRAMUSCULAR; INTRAVENOUS ONCE
Status: COMPLETED | OUTPATIENT
Start: 2017-06-06 | End: 2017-06-06

## 2017-06-06 RX ORDER — SODIUM CHLORIDE 9 MG/ML
INJECTION, SOLUTION INTRAVENOUS
Status: COMPLETED
Start: 2017-06-06 | End: 2017-06-06

## 2017-06-06 RX ORDER — NICOTINE POLACRILEX 4 MG
15-30 LOZENGE BUCCAL
Status: DISCONTINUED | OUTPATIENT
Start: 2017-06-06 | End: 2017-06-16 | Stop reason: HOSPADM

## 2017-06-06 RX ORDER — DEXTROSE MONOHYDRATE 25 G/50ML
25-50 INJECTION, SOLUTION INTRAVENOUS
Status: DISCONTINUED | OUTPATIENT
Start: 2017-06-06 | End: 2017-06-16 | Stop reason: HOSPADM

## 2017-06-06 RX ADMIN — ACETAMINOPHEN 650 MG: 325 TABLET, FILM COATED ORAL at 13:57

## 2017-06-06 RX ADMIN — SILVER SULFADIAZINE: 10 CREAM TOPICAL at 07:56

## 2017-06-06 RX ADMIN — SODIUM CHLORIDE 500 ML: 9 INJECTION, SOLUTION INTRAVENOUS at 04:29

## 2017-06-06 RX ADMIN — SODIUM PHOSPHATE, MONOBASIC, MONOHYDRATE AND SODIUM PHOSPHATE, DIBASIC, ANHYDROUS 20 MMOL: 276; 142 INJECTION, SOLUTION INTRAVENOUS at 20:34

## 2017-06-06 RX ADMIN — SODIUM PHOSPHATE, MONOBASIC, MONOHYDRATE AND SODIUM PHOSPHATE, DIBASIC, ANHYDROUS 15 MMOL: 276; 142 INJECTION, SOLUTION INTRAVENOUS at 00:13

## 2017-06-06 RX ADMIN — ACETAMINOPHEN 650 MG: 325 TABLET, FILM COATED ORAL at 04:29

## 2017-06-06 RX ADMIN — INSULIN ASPART 1 UNITS: 100 INJECTION, SOLUTION INTRAVENOUS; SUBCUTANEOUS at 11:19

## 2017-06-06 RX ADMIN — FOLIC ACID 1 MG: 1 TABLET ORAL at 07:53

## 2017-06-06 RX ADMIN — INSULIN ASPART 2 UNITS: 100 INJECTION, SOLUTION INTRAVENOUS; SUBCUTANEOUS at 15:42

## 2017-06-06 RX ADMIN — OYSTER SHELL CALCIUM WITH VITAMIN D 2 TABLET: 500; 200 TABLET, FILM COATED ORAL at 07:53

## 2017-06-06 RX ADMIN — HUMAN INSULIN 2 UNITS/HR: 100 INJECTION, SOLUTION SUBCUTANEOUS at 04:30

## 2017-06-06 RX ADMIN — SODIUM PHOSPHATE, MONOBASIC, MONOHYDRATE AND SODIUM PHOSPHATE, DIBASIC, ANHYDROUS 10 MMOL: 276; 142 INJECTION, SOLUTION INTRAVENOUS at 07:19

## 2017-06-06 RX ADMIN — IPRATROPIUM BROMIDE 0.5 MG: 0.5 SOLUTION RESPIRATORY (INHALATION) at 20:52

## 2017-06-06 RX ADMIN — FUROSEMIDE 60 MG: 10 INJECTION, SOLUTION INTRAVENOUS at 13:58

## 2017-06-06 RX ADMIN — ACETAMINOPHEN 650 MG: 325 TABLET, FILM COATED ORAL at 19:42

## 2017-06-06 RX ADMIN — GABAPENTIN 200 MG: 250 SOLUTION ORAL at 15:01

## 2017-06-06 RX ADMIN — IPRATROPIUM BROMIDE 0.5 MG: 0.5 SOLUTION RESPIRATORY (INHALATION) at 09:59

## 2017-06-06 RX ADMIN — POTASSIUM CHLORIDE 20 MEQ: 29.8 INJECTION, SOLUTION INTRAVENOUS at 14:28

## 2017-06-06 RX ADMIN — GABAPENTIN 200 MG: 250 SOLUTION ORAL at 07:54

## 2017-06-06 RX ADMIN — GABAPENTIN 200 MG: 250 SOLUTION ORAL at 19:37

## 2017-06-06 RX ADMIN — POTASSIUM CHLORIDE 20 MEQ: 29.8 INJECTION, SOLUTION INTRAVENOUS at 06:01

## 2017-06-06 RX ADMIN — MULTIVIT AND MINERALS-FERROUS GLUCONATE 9 MG IRON/15 ML ORAL LIQUID 15 ML: at 07:53

## 2017-06-06 RX ADMIN — IPRATROPIUM BROMIDE 0.5 MG: 0.5 SOLUTION RESPIRATORY (INHALATION) at 16:10

## 2017-06-06 RX ADMIN — DIGOXIN 125 MCG: 125 TABLET ORAL at 07:53

## 2017-06-06 RX ADMIN — INSULIN ASPART 1 UNITS: 100 INJECTION, SOLUTION INTRAVENOUS; SUBCUTANEOUS at 19:53

## 2017-06-06 RX ADMIN — PANTOPRAZOLE SODIUM 40 MG: 40 TABLET, DELAYED RELEASE ORAL at 07:55

## 2017-06-06 ASSESSMENT — PAIN DESCRIPTION - DESCRIPTORS
DESCRIPTORS: SORE
DESCRIPTORS: ACHING
DESCRIPTORS: SORE

## 2017-06-06 NOTE — PROGRESS NOTES
Care Coordinator- Discharge Planning     Admission Date/Time:  5/29/2017  Attending MD:  Sundar Wood MD     Data  Date of initial CC assessment:  5/31/17  Chart reviewed, discussed with interdisciplinary team.   Patient was admitted for:   1. Cardiac arrest (H)    2. Cardiac arrest with ventricular fibrillation (H)    3. Cardiogenic shock (H)         Assessment  Full assessment completed in previous note    Pt got extubated on 6/4.  Pt is working with PT/OT and ARU is rec. at this time.  PM&R have been consulted.  Pt requested to talk to CC and SW.  CC and SW visited pt and spouse for support and address her questions.  Pt is very motivated to get better/strong fast and wants to know the next steps after been seen by EP.  CC and SW discussed about rehab and the referral process.  Pt expressed that she would like to go to  ARU.   SW will assist with the rehab placement.        Plan  Anticipated Discharge Date:  TBD.  Anticipated Discharge Plan:   ARU.  SW will assist with rehab placement.  CC will cont to follow plan of care.      Shannon Sanabria RN, BSN  4A and 4E/ ICU  Care Coordinator  Phone: [phone]  Pager: [phone]

## 2017-06-06 NOTE — PROGRESS NOTES
Social Work Services Progress Note    Hospital Day: 9  Date of Initial Social Work Evaluation:  5/31/17  Collaborated with:  Patient, pt's  Romeo, RNMICHELET, team rounds, chart review.    Data:    Pt remains in ICU and continues to make improvements.  She is alert and oriented today and participating in conversations.    Intervention:    Met with pt and her  along with RNCC per their request.  Pt was eager to discuss d/c to ARU when appropriate.  Presented facility choice options; pt is agreeable to ARU.  Answered questions and provided education and support.  Also discussed FMLA paperwork, which CSI team continued to work on at the time of meeting.    Did receive completed FMLA paperwork after visit.  Will fax to pt's employer and provide originals to pt.    Assessment:   Despite voicing some frustration with new deficits, pt appears to be coping well and appropriately and is grateful for the care she has received.    Plan:    Anticipated Disposition:  Facility:  [facility]--will initiate referral following PM&R evaluation.    Barriers to d/c plan:  Pt is not medically stable.    Follow Up:  SW continues to follow for support to pt and family, resource referral, and d/c planning.    ANDIE Abbott, [facility]  Adult ICU Clinical   Pager [phone]

## 2017-06-06 NOTE — PLAN OF CARE
Problem: Goal Outcome Summary  Goal: Goal Outcome Summary  Outcome: Improving  Patient is alert and oriented. Pupils 4mm equal and reactive. Generalized weakness with significantly impaired mobility.  Lung sounds had crackles with noon assessment; MD notified; lasix will be ordered. Using the IS with encouragement; achieved 850ml. Afib. This morning rates were  (120-130 with activity).  Around 1130 rate went to 70s.  MDs updated and said no need to pace at this time. -142/65-91. BUE pulses 1+, BLE pulses 2+. UOP 30-60ml/hr this morning. Up to the chair using the mechanical lift. Spouse at bedside; patient and  updated on plan of care. Will continue to closely monitor and notify MD of any changes.

## 2017-06-06 NOTE — PLAN OF CARE
Problem: Goal Outcome Summary  Goal: Goal Outcome Summary  OT: Pt max A bed mobility and transfers w/ sling at this time, OT educated pt on foam block therex for RUE and AROM for L hand w/in pain free ROM.   Rec: rehab to increase ind in ADLS/IASDLS

## 2017-06-06 NOTE — PLAN OF CARE
Problem: Goal Outcome Summary  Goal: Goal Outcome Summary  PT 4E: Pt instructed in supine and seated LE AROM strength ex. Also stretches provided to hamstring and heel cords. Pt lacking full ankle AROM DF, discussed [place] soft AFO order as pt identified the concern and described goal to avoid foot drop. Instructed spouse in ankle ROM/stretching to aid pt.      Pt yet very weak in trunk musculature, having R scapular pain, L UE pain and required total assist to roll x 2, sling to edge of bed. Pt worked on sitting at edge of bed progressing to 10 min, requiring MAXA x 1 (RN) and PAT-MODA from PT to help reduce R and retro lean. Pt further transferred with sling/lift to chair.      Recommend ARU at discharge to maximize functional gains given high pt motivation, PLOF, requiring 3 therapies currently. Pt very motivated and open to recommendation. Recommend PM&R consult.

## 2017-06-06 NOTE — CONSULTS
[facility] Hematology Consultation    Amelia Michel MRN# [id number]   Age: 56 year old YOB: 1960   Date of Admission: 5/29/2017     Reason for consult: Thrombocytopenia       Requesting physician Dr. Og        Level of consult: One-time consult to assist in determining a diagnosis, recommend an appropriate treatment plan and place orders           Assessment and Plan:   Assessment:  57 yo F with hx HTN, RA (on MTX, Plaquenil, Prednisone), Afib/Flutter, VSD s/p repair who was admitted on 5/29 after out of hospital cardiac arrest with ROSC, now being evaluated for acute on chronic thrombocytopenia. Pt's platelets noted to decrease from 104 on admission 5/29, to 58 on 5/30, to 29 on 6/3, and 15 on 6/5. Her platelets appear to be chronically low in 80s-120s range, likely from her RA immunosuppressants. Heparin induced thrombocytopenia is on DDX, especially with new radial artery thrombus and re-exposure prior to platelet drop. Her 4T score is 5 (intermediate probability). We would recommend HIT antibody testing as this may affect her future care if she is to receive anticoagulation for a cardiac procedure. With clinical suspicion of HIT, we normally anticoagulate as this is a prothrombotic state, but with thrombocytopenia will hold off unless it becomes positive. Other possible etiologies are antibiotics (has been carbapenems throughout hospitalization, now completed), ITP (unlikely, can see if she bumps her platelets with transfusion), DIC (unlikely with normal coags).       Plan:  - f/u HIT antibody  - if positive, would recommend Argatroban gtt while transfusing to keep plts > 50 and send out Serotonin release assay  - daily CBC   - transfuse plt for > 20 with bleeding or > 10 without bleeding     Pt discussed with Dr. Choe who agrees with plan.     Gabbi Rodarte MD  Hematology Oncology fellow  [phone]             Chief Complaint:   TCP     History is obtained from the patient     Ms.  Marilu is a 57 yo F with hx HTN, RA (on MTX, Plaquenil, Prednisone), Afib/Flutter, VSD s/p repair who was admitted on 5/29 after out of hospital cardiac arrest with ROSC and s/p therapeutic hypothermia. She went to cath lab on 5/29 which showed normal coronaries. She has been treated with Meropenem and Ertapenem for ESBL UTI and aspiration PNA during this hospitalization. Her platelets were 100s on admission, now 15. She was admitted on 5/15-5/23 for palpitations, received IV heparin from 5/15-5/18. She was discharged on Eliquis for Afib. She likely received some heparin in cath lab on 5/29 although none documented in MAR, per her cardiology team. Pt was extubated yesterday. She has not had any bleeding that her nurses have noted. Her shock liver and CELSA are resolving. Hgb has been stable in 8.5-10 range. WBC mildly low with normal neutrophils. She has been treated with stress dose steroids.     Pt awake and interactive. She states that her RA has been well controlled on MTX, plaquenil, Prednisone for a few years. She has had mainly knee and wrist pain with RA, no systemic manifestations besides arthritis.           Past Medical History:     Past Medical History:   Diagnosis Date     HTN (hypertension)      Primary pulmonary hypertension (H)      Rheumatoid arthritis(714.0)              Past Surgical History:     Past Surgical History:   Procedure Laterality Date     ANESTHESIA CARDIOVERSION N/A 5/17/2017    Procedure: ANESTHESIA CARDIOVERSION;  ANESTHESIA CARDIOVERSION;  Surgeon: GENERIC ANESTHESIA PROVIDER;  Location: [facility] OR     ARTHRODESIS WRIST  2000    Right wrist     FOOT SURGERY      4 left and 2 right     RELEASE CARPAL TUNNEL BILATERAL       REPAIR VENTRICULAR SEPTAL DEFECT  1969             Social History:     Social History   Substance Use Topics     Smoking status: Never Smoker     Smokeless tobacco: Never Used     Alcohol use Yes      Comment: Glass of wine two evenings a night             Family History:      Family History   Problem Relation Age of Onset     Breast Cancer Mother      Hypertension Mother      Alzheimer Disease Mother      Hypertension Father      Blood Disease Father      Lymphoa     Circulatory Father      A Fib     DIABETES Paternal Grandmother      Family history reviewed           Immunizations:   Immunization status is unknown          Allergies:     Allergies   Allergen Reactions     Penicillins      Tape [Adhesive Tape] Rash             Medications:     Prescriptions Prior to Admission   Medication Sig Dispense Refill Last Dose     metoprolol (TOPROL-XL) 100 MG 24 hr tablet Take 1 tablet (100 mg) by mouth daily 30 tablet 3 2017 at Unknown time     [] nitrofurantoin, macrocrystal-monohydrate, (MACROBID) 100 MG capsule Take 1 capsule (100 mg) by mouth every 12 hours for 8 days 16 capsule 0 2017 at Unknown time     amiodarone 400 MG TABS Take 400 mg by mouth 2 times daily for 12 days 24 tablet 0 2017 at Unknown time     amiodarone (PACERONE/CODARONE) 200 MG tablet Take 1 tablet (200 mg) by mouth daily 30 tablet 3 2017 at Unknown time     apixaban ANTICOAGULANT (ELIQUIS) 5 MG tablet Take 1 tablet (5 mg) by mouth 2 times daily 60 tablet 3 2017 at Unknown time     diltiazem 120 MG 24 hr capsule Take 1 capsule (120 mg) by mouth daily 30 capsule 1 2017 at Unknown time     Calcium Carb-Cholecalciferol 600-800 MG-UNIT TABS Take 2 tablets by mouth every morning   2017 at Unknown time     Multiple Vitamins-Minerals (WOMENS MULTIVITAMIN PO) Take 1 tablet by mouth daily At bedtime   2017 at Unknown time     Coenzyme Q10 (COQ-10 PO) Take 1 tablet by mouth daily In the morning   2017 at Unknown time     gabapentin (NEURONTIN) 100 MG capsule Take 2 capsules (200 mg) by mouth 3 times daily (Patient taking differently: Take 200 mg by mouth Take 2 capsules (200 mg) by mouth every 6 hours (4 am, 10am, 4pm, 10pm)) 180 capsule 3 2017 at Unknown time      "pramipexole (MIRAPEX) 0.25 MG tablet Reported on 4/4/2017 5/28/2017 at Unknown time     traMADol (ULTRAM) 50 MG tablet Take 50 mg by mouth Every 6 hours as needed for pain   5/28/2017 at Unknown time     diphenoxylate-atropine (LOMOTIL) 2.5-0.025 MG per tablet Take 0.5 tablets by mouth 2 times daily    5/28/2017 at Unknown time     ASPIRIN PO Take 325 mg by mouth daily At bedtime   5/28/2017 at Unknown time     hydroxychloroquine (PLAQUENIL) 200 MG tablet Take 1 tablet by mouth twice daily on Monday, Wednesday, and Friday and 1 tablet once daily by mouth on Tuesday, Thursday, Saturday, Sunday.   5/28/2017 at Unknown time     leflunomide (ARAVA) 20 MG tablet Take 20 mg by mouth daily In the morning   5/28/2017 at Unknown time     folic acid (FOLVITE) 1 MG tablet Take 1 mg by mouth daily.   5/28/2017 at Unknown time     predniSONE (DELTASONE) 1 MG tablet Take 3 mg by mouth daily In the morning   5/28/2017 at Unknown time     METHOTREXate 2.5 MG tablet Take 10 mg by mouth once a week On Tuesdays 5/28/2017 at Unknown time             Review of Systems:   The Review of Systems is negative other than noted in the HPI    /78  Pulse (!) 43  Temp 97.8  F (36.6  C) (Axillary)  Resp 26  Ht 1.473 m (4' 10\")  Wt 74.5 kg (164 lb 3.9 oz)  SpO2 99%  BMI 34.33 kg/m2  Gen: Alert, NAD  HEENT: MMM, no oral lesions  Neck: supple  CV: RRR, no murmurs  Resp: breathing comfortably  Abd: Soft, NTND  Ext: 1+ edema bilaterally  Neuro: Alert, responds to questions appropriately   Skin: scattered bruising on arms           Data:     Results for orders placed or performed during the hospital encounter of 05/29/17 (from the past 24 hour(s))   Magnesium   Result Value Ref Range    Magnesium 2.2 1.6 - 2.3 mg/dL   Potassium   Result Value Ref Range    Potassium 4.9 3.4 - 5.3 mmol/L   Phosphorus   Result Value Ref Range    Phosphorus 2.0 (L) 2.5 - 4.5 mg/dL   Lactate Dehydrogenase   Result Value Ref Range    Lactate Dehydrogenase 353 " (H) 81 - 234 U/L   Basic metabolic panel   Result Value Ref Range    Sodium 151 (H) 133 - 144 mmol/L    Potassium 3.5 3.4 - 5.3 mmol/L    Chloride 111 (H) 94 - 109 mmol/L    Carbon Dioxide 34 (H) 20 - 32 mmol/L    Anion Gap 6 3 - 14 mmol/L    Glucose 134 (H) 70 - 99 mg/dL    Urea Nitrogen 39 (H) 7 - 30 mg/dL    Creatinine 0.79 0.52 - 1.04 mg/dL    GFR Estimate 75 >60 mL/min/1.7m2    GFR Estimate If Black >90   GFR Calc   >60 mL/min/1.7m2    Calcium 7.6 (L) 8.5 - 10.1 mg/dL   CBC with platelets   Result Value Ref Range    WBC 3.3 (L) 4.0 - 11.0 10e9/L    RBC Count 2.50 (L) 3.8 - 5.2 10e12/L    Hemoglobin 8.4 (L) 11.7 - 15.7 g/dL    Hematocrit 25.9 (L) 35.0 - 47.0 %     (H) 78 - 100 fl    MCH 33.6 (H) 26.5 - 33.0 pg    MCHC 32.4 31.5 - 36.5 g/dL    RDW 19.5 (H) 10.0 - 15.0 %    Platelet Count 15 (LL) 150 - 450 10e9/L   Digoxin level   Result Value Ref Range    Digoxin Level 0.6 0.5 - 2.0 ug/L   INR   Result Value Ref Range    INR 1.30 (H) 0.86 - 1.14   Type and Screen (AM Draw)   Result Value Ref Range    ABO O     RH(D)  Neg     Antibody Screen Neg     Test Valid Only At       [facility]    Specimen Expires 06/08/2017    Magnesium   Result Value Ref Range    Magnesium 2.1 1.6 - 2.3 mg/dL   Phosphorus   Result Value Ref Range    Phosphorus 2.7 2.5 - 4.5 mg/dL   Hepatic panel   Result Value Ref Range    Bilirubin Direct 0.7 (H) 0.0 - 0.2 mg/dL    Bilirubin Total 1.7 (H) 0.2 - 1.3 mg/dL    Albumin 1.9 (L) 3.4 - 5.0 g/dL    Protein Total 4.2 (L) 6.8 - 8.8 g/dL    Alkaline Phosphatase 149 40 - 150 U/L     (H) 0 - 50 U/L    AST 43 0 - 45 U/L   CBC with platelets differential   Result Value Ref Range    WBC 3.6 (L) 4.0 - 11.0 10e9/L    RBC Count 2.58 (L) 3.8 - 5.2 10e12/L    Hemoglobin 8.6 (L) 11.7 - 15.7 g/dL    Hematocrit 27.1 (L) 35.0 - 47.0 %     (H) 78 - 100 fl    MCH 33.3 (H) 26.5 - 33.0 pg    MCHC 31.7 31.5 - 36.5 g/dL    RDW 19.2 (H)  10.0 - 15.0 %    Platelet Count 15 (LL) 150 - 450 10e9/L    Diff Method Automated Method     % Neutrophils 83.6 %    % Lymphocytes 15.3 %    % Monocytes 0.8 %    % Eosinophils 0.0 %    % Basophils 0.0 %    % Immature Granulocytes 0.3 %    Nucleated RBCs 0 0 /100    Absolute Neutrophil 3.0 1.6 - 8.3 10e9/L    Absolute Lymphocytes 0.6 (L) 0.8 - 5.3 10e9/L    Absolute Monocytes 0.0 0.0 - 1.3 10e9/L    Absolute Eosinophils 0.0 0.0 - 0.7 10e9/L    Absolute Basophils 0.0 0.0 - 0.2 10e9/L    Abs Immature Granulocytes 0.0 0 - 0.4 10e9/L    Absolute Nucleated RBC 0.0    Platelets prepare order unit   Result Value Ref Range    Ordered Component Type PLT Pheresis     Units Ordered 1    Blood component   Result Value Ref Range    Unit Number [id number]     Blood Component Type PlateletPheresis LeukoReduced Irradiated     Division Number 00     Status of Unit Released to care unit 06/05/2017 0643     Blood Product Code [id number]     Unit Status ISS    EKG 12-lead, tracing only   Result Value Ref Range    Interpretation ECG Click View Image link to view waveform and result    Platelets prepare order unit   Result Value Ref Range    Ordered Component Type PLT Pheresis     Units Ordered 2    Blood component   Result Value Ref Range    Unit Number [id number]     Blood Component Type PlateletPheresis,LeukoRed Irrad (Part 2)     Division Number 00     Status of Unit Released to care unit 06/05/2017 1107     Blood Product Code [id number]     Unit Status ISS    Blood component   Result Value Ref Range    Unit Number [id number]     Blood Component Type PlateletPheresis LeukoReduced Irradiated     Division Number 00     Status of Unit Released to care unit 06/05/2017 1340     Blood Product Code [id number]     Unit Status ISS    Clostridium difficile toxin B PCR   Result Value Ref Range    Specimen Description Feces     C Diff Toxin B PCR  NEG     Negative  Negative: Clostridium difficile target DNA sequences NOT detected, presumed    negative for Clostridium difficile toxin B or the number of bacteria present   may be below the limit of detection for the test.   FDA approved assay performed using [employer] GeneXpert real-time PCR.   A negative result does not exclude actual disease due to Clostridium difficile   and may be due to improper collection, handling and storage of the specimen or   the number of organisms in the specimen is below the detection limit of the   assay.     Echo Limited with Lumason    Narrative    [id number]  [id number]  [id number]  [id number]^GIOVANNA^CORY^ERICKA           [facility],[place]  Echocardiography Laboratory  [address] [place] [id number]     Name: FIDELIA MIDDLETON  MRN: [id number]  : 1960  Study Date: 2017 11:03 AM  Age: 56 yrs  Gender: Female  Patient Location: [facility]  Reason For Study: Limited to reassess EF  Ordering Physician: CORY HEREDIA  Performed By: Yamil Alvarenga [facility]     BSA: 1.7 m2  Height: 58 in  Weight: 164 lb  HR: 100  BP: 132/75 mmHg  _____________________________________________________________________________  __        Procedure  Limited Portable Echo Adult. Contrast Lumason. Technically difficult  study.Extremely difficult acoustic windows despite the use of contrast for  endcardial border definition. Lumason (NDC #3009-3313-72) given intravenously.  Patient was given 5ml mixture of Lumason. 0 ml wasted.  _____________________________________________________________________________  __        Interpretation Summary  Limited, technically difficult study. Extremely difficult acoustic windows  despite the use of contrast for endcardial border definition.  Borderline (EF 50-55%) reduced left ventricular function is present.  Additionally, the abnormal non-specific septal motion and irregular cardiac  cycle contribute to the difficulty in accurately measuring LVEF.  The IVC is dilated at 2.5 cm, c/w increased RAP. The RAP is in excess of  15  mmHg.  _____________________________________________________________________________  __        Left Ventricle  Borderline (EF 50-55%) reduced left ventricular function is present. Abnormal  non-specific septal motion is present.  _____________________________________________________________________________  __                                Report approved by: Milly Guzman 06/05/2017 12:19 PM                    _____________________________________________________________________________  __      Basic metabolic panel   Result Value Ref Range    Sodium 149 (H) 133 - 144 mmol/L    Potassium 3.1 (L) 3.4 - 5.3 mmol/L    Chloride 107 94 - 109 mmol/L    Carbon Dioxide 36 (H) 20 - 32 mmol/L    Anion Gap 5 3 - 14 mmol/L    Glucose 136 (H) 70 - 99 mg/dL    Urea Nitrogen 35 (H) 7 - 30 mg/dL    Creatinine 0.71 0.52 - 1.04 mg/dL    GFR Estimate 85 >60 mL/min/1.7m2    GFR Estimate If Black >90   GFR Calc   >60 mL/min/1.7m2    Calcium 8.2 (L) 8.5 - 10.1 mg/dL   CBC with platelets   Result Value Ref Range    WBC 3.5 (L) 4.0 - 11.0 10e9/L    RBC Count 2.29 (L) 3.8 - 5.2 10e12/L    Hemoglobin 7.5 (L) 11.7 - 15.7 g/dL    Hematocrit 23.9 (L) 35.0 - 47.0 %     (H) 78 - 100 fl    MCH 32.8 26.5 - 33.0 pg    MCHC 31.4 (L) 31.5 - 36.5 g/dL    RDW 19.0 (H) 10.0 - 15.0 %    Platelet Count 68 (L) 150 - 450 10e9/L     Peripheral Blood Smear:   -Slight normochromic, macrocytic anemia; slightly increased erythrocyte   regeneration; numerous echinocytes; rare red blood cell fragments   -Lymphocytopenia   -Marked thrombocytopenia     Gabbi Rodarte MD

## 2017-06-06 NOTE — PROGRESS NOTES
Patient's vitals remained stable throughout night. Alert and oriented x4, -130/60-70s, HR 90s-low 100s, Afib, 2 L NC, SpO2 >97%. Urine output 30-75 cc/hr. Insulin @ 2 units/hr. Electrolytes supplemented per protocol. Platelets of 49 this am (down from 68 after receiving 3 units platelets yesterday). Awaiting HIT panel results. Plan is for patient to get an ICD before discharge and possible ablation in the future. Will continue to monitor patient closely and update MDs as needed.

## 2017-06-06 NOTE — PLAN OF CARE
Problem: Goal Outcome Summary  Goal: Goal Outcome Summary  OT/Edema: Pt with moderate soft 1+ pitting edema in LEs. GCBs placed from MTPs to knee creases to help reduce edema and risk for skin breakdown. Please remove garment if it gets soiled or if pt c/o LE pain or numbness, will do skin check in 24 hrs.

## 2017-06-06 NOTE — PROGRESS NOTES
[facility] Nurse Inpatient Wound Assessment     Follow up Assessment of wound(s) on pt's:   Left arm    Data:     Patient History:      per MD note(s):  56 year old female with a past history of HTN, RA, VSD s/p repair in 1969s, recent hospitalization for a fib/a flutter/SVT 5/15 - 5/23 who is admitted to CV ICU for therapeutic hypothermia after a cardiac arrest and was incidentally found to have loss of left radial pulse on nursing doppler checks. 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 No DVTs.   ECMO removed, patient extubated, up in chair this visit  WO to assess left arm extravasation site    Moisture Management:  Dressings    Current Diet / Nutrition:         Active Diet Order      NPO for Medical/Clinical Reasons Except for: Other; Specify: May take medications with a drink of water prior to Electrophysiology study        Tube feeding       Jay Score  Avg: 15.2  Min: 9  Max: 23   Recent Labs   Lab Test  06/06/17   0409   06/05/17   0354   06/04/17   0408   05/31/17   1018   ALBUMIN   --    --   1.9*   --    --    < >  1.9*   HGB  8.1*   < >  8.4*   < >  8.1*   < >  8.3*   INR  1.27*   --   1.30*   --   1.30*   < >  3.38*   WBC  3.0*   < >  3.3*   < >  4.0   < >  7.5   A1C   --    --    --    --    --    --   Canceled, Test credited   UNABLE TO CALCULATE % HBA1C DUE TO LOW HGB AND/OR LOW HGBA1C  NOTIFIED CHELSEA BEE RN AT 1253 ON 5/31/17 [facility]     CRP   --    --    --    --   23.0*   < >   --     < > = values in this interval not displayed.       Wound Assessment :   Left arm  Wound History:  Extravasation wound with large area of epidermal loss    5/30/17 photo      Wound Base: 40% tan slough/necrosis in central wound with surrounding 60% mottled dermis, continues to weep medial    Specific Dimensions (length x width x depth, in cm) :   15 x 12 x 0.1 cm (no  change)    Palpation of the wound bed:  edema    Periwound Skin: intact,      Drainage:  . Amount: moderate . Color: serous     Odor: none    Pain:  At elbow, nagging          Intervention:     Patient's chart evaluated.      Wound(s) was assessed    Wound Care: was done: changed dressing, Visual inspection    Orders  Updated    Supplies  Polymem foam to clean and debride wound    Discussed plan of care with Nurse and patient          Assessment:       Large area of epidermal loss with potential full thickness skin loss at area of necrosis 2/2 extravastion         Plan:     Nursing to notify the Provider(s) and re-consult the [facility] Nurse if wound(s) deteriorate(s).    Plan of care for wound located on left arem: Change dressing every other day, clean skin around wound with Microklenz, DO NOT clean wound-dressing is designed to treat and clean wound. Cover wound with Polymem foam, ABD, secure with Kerlix and Flexinet    WO Nurse will return: weekly     Face to face time: 20 minutes

## 2017-06-06 NOTE — PLAN OF CARE
Problem: Goal Outcome Summary  Goal: Goal Outcome Summary  Outcome: Therapy, progress toward functional goals as expected  SLP: Patient seen for dysphagia treatment while seated upright in bed after being up in chair for 2 hours. Patient reported fatigue and appeared somewhat short of breath. Patient consumed 5 small ice chips, 6 small 1/3 tsp size sips water by spoon, 15 cc water by medicine cup and 3 small 1/3 tsp size bites applesauce. Note incoordination with sips by medicine cup and patient reported fatigue with task. No overt aspiration signs occurred across consistencies, however patient at risk for aspiration given swallow incoordination and level of fatigue and shortness of breath. Recommend continue NPO with alternate nutrition/hydration/medication for now. Patient may have single ice chips as tolerated for comfort when seated upright fully given 1:1 assist. Please continue frequent thorough oral cares. Will continue to assess for p.o. Readiness when patient demonstrating consistent tolerance of p.o. during sessions. Patient in agreement. Consulted with RN. Recommend acute rehab at discharge.

## 2017-06-06 NOTE — PROGRESS NOTES
CARDIOLOGY [facility] PROGRESS NOTE    SUBJECTIVE:  Feels a little dyspneic this morning. Hematology team saw Mrs. Michel, requested to r/o HIT. Pain improved. RUQ US verbal interpretation with mild thickening, patent portal vein, no evidence of cholecystitis or stones   Otherwise unremarkable course.     ROS otherwise negative.    OBJECTIVE:  Vital signs:  116/81 (Range 105-152/)   ()  RR 16 (13-32)  T 98.4 (Tm 99)  06/06 170 lbs (06/05 164)    I/O: +807  Intake: 3157 mL (720 enteral, 900 IV, 897 blood, 640 NG)  Output: 2350 urine     PHYSICAL EXAM:  Gen: Looks well  HEENT: MMM, NGT  Resp: Mild tachypnea with some ACM usage but talking in full sentences, chest ausc anteriorly notable for crackles  CVS: JVP to mid-neck, S1+S2+3/6 PSM noted, no added sounds  Abdo: ND, S, NT, +BS  Extremities: Warm, well-perfused, legs wrapped  Neuro: E4V5M6     Recent Labs   Lab Test  06/06/17   0409   06/05/17   0354   HGB  8.1*   < >  8.4*   HCT  25.0*   < >  25.9*   WBC  3.0*   < >  3.3*   MCV  105*   < >  104*   MCH  34.0*   < >  33.6*   MCHC  32.4   < >  32.4   RDW  19.2*   < >  19.5*   PLT  49*   < >  15*   NA  146*   < >  151*   POTASSIUM  3.5   < >  3.5   CHLORIDE  107   < >  111*   CO2  36*   < >  34*   BUN  39*   < >  39*   CR  0.84   < >  0.79   GLC  138*   < >  134*   VIKTORIYA  7.5*   < >  7.6*   ALBUMIN   --    --   1.9*   BILITOTAL   --    --   1.7*   ALKPHOS   --    --   149   AST   --    --   43   ALT   --    --   213*    < > = values in this interval not displayed.   Glc 115-140    Labs:  Thiopurine metabolites level awaited  S100 awaited      Imaging:      Cardiac meds: Digoxin 125 micrograms q24h  Non-cardiac meds: Calcium/Vit D, gabapentin 200mg TID, folic acid, ipratropium nebs, MVI, pantoprazole 40 mg q24h, senna-docusate  Drips:  Insulin infusion stopped     ASSESSMENT/PLAN:  Mrs. Amelia Michel is a 56-year old lady with a PMHx of VSD repair (1969), RA, and a recent diagnosis of AFib/AFlutter/SVT who  presented to [facility] on 05/29 after OOH cardiac arrest with shockable rhythm. After ROSC in the field, she was transferred to [facility] for further management. Extubated 06/04.       - Hypoxic respiratory failure   - s/p 7-day course of meropenem/ertapenem (05/30-06/04)   - Further furosemide 60 mg IV x1 06/06      - Out-of-hospital cardiac arrest and cardiogenic shock; Hx of AFib/Aflutter/SVT; sinus arrest   - Continue digoxin 0.125 mg q24h; will increase to 0.25 mg q24h if rate consistently >120    - No BB or CCB for now    - EP team to consider AICD placement later this week     - Severe critical illness myopathy   - OT/PT   - Swallow study to be repeated 06/06     - Severe pancytopenia   - Likely a combination of severe liver injury as well as drug-induced (vancomycin during this admission and heparin during last admission)   - Platelet goal >50K per Hematology    - HIT panel awaited      - Ischemic liver injury complicated by coagulopathy   - Feeding via NGT   - Monitor liver enzymes              - Monitor for signs of bleeding     - Hypoglycemia; hypovolemic hypernatremia   - Maintain stable feeds    - Insulin infusion stopped 06/06 --> subcutaneous insulin    - NGT feeding        Chronic/inactive issues:  - ESBL UTI, aspiration pneumonia: s/p meropenem/ertapenem course  - CELSA: Baseline Cr 0.7-0.8; peak Cr 1.9  - RA: Weaning of stress dosed steroids completed 06/04; hold immunosuppressants   - Seizure: levetiracetam discontinued 06/05     Seen and staffed with Dr. Wood.    Gilbert Og  Cardiology Fellow  Pager: [phone]              I have seen and examined the patient with the [facility] team. I agree with the assessment and plan of the note above.I have reviewed pertinent labs.     Sundar Wood MD  Interventional Cardiology  Pager: [phone]

## 2017-06-07 ENCOUNTER — APPOINTMENT (OUTPATIENT)
Dept: SPEECH THERAPY | Facility: CLINIC | Age: 57
DRG: 260 | End: 2017-06-07
Attending: NURSE PRACTITIONER
Payer: COMMERCIAL

## 2017-06-07 ENCOUNTER — APPOINTMENT (OUTPATIENT)
Dept: OCCUPATIONAL THERAPY | Facility: CLINIC | Age: 57
DRG: 260 | End: 2017-06-07
Attending: NURSE PRACTITIONER
Payer: COMMERCIAL

## 2017-06-07 ENCOUNTER — APPOINTMENT (OUTPATIENT)
Dept: PHYSICAL THERAPY | Facility: CLINIC | Age: 57
DRG: 260 | End: 2017-06-07
Attending: NURSE PRACTITIONER
Payer: COMMERCIAL

## 2017-06-07 LAB
ANION GAP SERPL CALCULATED.3IONS-SCNC: 4 MMOL/L (ref 3–14)
ANION GAP SERPL CALCULATED.3IONS-SCNC: 5 MMOL/L (ref 3–14)
BACTERIA SPEC CULT: NO GROWTH
BLD PROD TYP BPU: NORMAL
BLD PROD TYP BPU: NORMAL
BLD UNIT ID BPU: 0
BLOOD PRODUCT CODE: NORMAL
BPU ID: NORMAL
BUN SERPL-MCNC: 34 MG/DL (ref 7–30)
BUN SERPL-MCNC: 37 MG/DL (ref 7–30)
CALCIUM SERPL-MCNC: 7.4 MG/DL (ref 8.5–10.1)
CALCIUM SERPL-MCNC: 7.5 MG/DL (ref 8.5–10.1)
CHLORIDE SERPL-SCNC: 107 MMOL/L (ref 94–109)
CHLORIDE SERPL-SCNC: 108 MMOL/L (ref 94–109)
CO2 SERPL-SCNC: 34 MMOL/L (ref 20–32)
CO2 SERPL-SCNC: 36 MMOL/L (ref 20–32)
CREAT SERPL-MCNC: 0.72 MG/DL (ref 0.52–1.04)
CREAT SERPL-MCNC: 0.75 MG/DL (ref 0.52–1.04)
ERYTHROCYTE [DISTWIDTH] IN BLOOD BY AUTOMATED COUNT: 18.9 % (ref 10–15)
ERYTHROCYTE [DISTWIDTH] IN BLOOD BY AUTOMATED COUNT: 19.1 % (ref 10–15)
ERYTHROCYTE [DISTWIDTH] IN BLOOD BY AUTOMATED COUNT: 19.4 % (ref 10–15)
GFR SERPL CREATININE-BSD FRML MDRD: 80 ML/MIN/1.7M2
GFR SERPL CREATININE-BSD FRML MDRD: 83 ML/MIN/1.7M2
GLUCOSE BLDC GLUCOMTR-MCNC: 127 MG/DL (ref 70–99)
GLUCOSE BLDC GLUCOMTR-MCNC: 127 MG/DL (ref 70–99)
GLUCOSE BLDC GLUCOMTR-MCNC: 128 MG/DL (ref 70–99)
GLUCOSE BLDC GLUCOMTR-MCNC: 130 MG/DL (ref 70–99)
GLUCOSE BLDC GLUCOMTR-MCNC: 131 MG/DL (ref 70–99)
GLUCOSE BLDC GLUCOMTR-MCNC: 149 MG/DL (ref 70–99)
GLUCOSE SERPL-MCNC: 121 MG/DL (ref 70–99)
GLUCOSE SERPL-MCNC: 135 MG/DL (ref 70–99)
HCT VFR BLD AUTO: 22 % (ref 35–47)
HCT VFR BLD AUTO: 23 % (ref 35–47)
HCT VFR BLD AUTO: 23.6 % (ref 35–47)
HGB BLD-MCNC: 7.1 G/DL (ref 11.7–15.7)
HGB BLD-MCNC: 7.3 G/DL (ref 11.7–15.7)
HGB BLD-MCNC: 7.8 G/DL (ref 11.7–15.7)
INR PPP: 1.27 (ref 0.86–1.14)
LACTATE BLD-SCNC: 1.1 MMOL/L (ref 0.7–2.1)
LDH SERPL L TO P-CCNC: 279 U/L (ref 81–234)
MAGNESIUM SERPL-MCNC: 1.9 MG/DL (ref 1.6–2.3)
MCH RBC QN AUTO: 33.3 PG (ref 26.5–33)
MCH RBC QN AUTO: 33.8 PG (ref 26.5–33)
MCH RBC QN AUTO: 34.4 PG (ref 26.5–33)
MCHC RBC AUTO-ENTMCNC: 31.7 G/DL (ref 31.5–36.5)
MCHC RBC AUTO-ENTMCNC: 32.3 G/DL (ref 31.5–36.5)
MCHC RBC AUTO-ENTMCNC: 33.1 G/DL (ref 31.5–36.5)
MCV RBC AUTO: 104 FL (ref 78–100)
MCV RBC AUTO: 105 FL (ref 78–100)
MCV RBC AUTO: 105 FL (ref 78–100)
MICRO REPORT STATUS: NORMAL
NUM BPU REQUESTED: 1
PHOSPHATE SERPL-MCNC: 3.3 MG/DL (ref 2.5–4.5)
PLATELET # BLD AUTO: 26 10E9/L (ref 150–450)
PLATELET # BLD AUTO: 26 10E9/L (ref 150–450)
PLATELET # BLD AUTO: 33 10E9/L (ref 150–450)
POTASSIUM SERPL-SCNC: 3.6 MMOL/L (ref 3.4–5.3)
POTASSIUM SERPL-SCNC: 3.9 MMOL/L (ref 3.4–5.3)
RBC # BLD AUTO: 2.1 10E12/L (ref 3.8–5.2)
RBC # BLD AUTO: 2.19 10E12/L (ref 3.8–5.2)
RBC # BLD AUTO: 2.27 10E12/L (ref 3.8–5.2)
RESULT: NORMAL
RESULT: NORMAL
SEND OUTS MISC TEST CODE: NORMAL
SEND OUTS MISC TEST CODE: NORMAL
SEND OUTS MISC TEST SPECIMEN: NORMAL
SEND OUTS MISC TEST SPECIMEN: NORMAL
SODIUM SERPL-SCNC: 145 MMOL/L (ref 133–144)
SODIUM SERPL-SCNC: 148 MMOL/L (ref 133–144)
SPECIMEN SOURCE: NORMAL
TEST NAME: NORMAL
TEST NAME: NORMAL
TRANSFUSION STATUS PATIENT QL: NORMAL
TRANSFUSION STATUS PATIENT QL: NORMAL
WBC # BLD AUTO: 2.6 10E9/L (ref 4–11)
WBC # BLD AUTO: 2.6 10E9/L (ref 4–11)
WBC # BLD AUTO: 2.9 10E9/L (ref 4–11)

## 2017-06-07 PROCEDURE — 83615 LACTATE (LD) (LDH) ENZYME: CPT | Performed by: INTERNAL MEDICINE

## 2017-06-07 PROCEDURE — 92507 TX SP LANG VOICE COMM INDIV: CPT | Mod: GN

## 2017-06-07 PROCEDURE — 99233 SBSQ HOSP IP/OBS HIGH 50: CPT | Mod: GC | Performed by: INTERNAL MEDICINE

## 2017-06-07 PROCEDURE — 25000132 ZZH RX MED GY IP 250 OP 250 PS 637: Performed by: INTERNAL MEDICINE

## 2017-06-07 PROCEDURE — 40000133 ZZH STATISTIC OT WARD VISIT: Performed by: OCCUPATIONAL THERAPIST

## 2017-06-07 PROCEDURE — 83735 ASSAY OF MAGNESIUM: CPT | Performed by: INTERNAL MEDICINE

## 2017-06-07 PROCEDURE — 92526 ORAL FUNCTION THERAPY: CPT | Mod: GN

## 2017-06-07 PROCEDURE — 97140 MANUAL THERAPY 1/> REGIONS: CPT | Mod: GO | Performed by: OCCUPATIONAL THERAPIST

## 2017-06-07 PROCEDURE — 80048 BASIC METABOLIC PNL TOTAL CA: CPT | Performed by: INTERNAL MEDICINE

## 2017-06-07 PROCEDURE — 97535 SELF CARE MNGMENT TRAINING: CPT | Mod: GO

## 2017-06-07 PROCEDURE — 99232 SBSQ HOSP IP/OBS MODERATE 35: CPT | Mod: GC | Performed by: INTERNAL MEDICINE

## 2017-06-07 PROCEDURE — P9037 PLATE PHERES LEUKOREDU IRRAD: HCPCS | Performed by: STUDENT IN AN ORGANIZED HEALTH CARE EDUCATION/TRAINING PROGRAM

## 2017-06-07 PROCEDURE — 40000193 ZZH STATISTIC PT WARD VISIT: Performed by: PHYSICAL THERAPIST

## 2017-06-07 PROCEDURE — 84100 ASSAY OF PHOSPHORUS: CPT | Performed by: INTERNAL MEDICINE

## 2017-06-07 PROCEDURE — 97530 THERAPEUTIC ACTIVITIES: CPT | Mod: GP | Performed by: PHYSICAL THERAPIST

## 2017-06-07 PROCEDURE — 21400006 ZZH R&B CCU INTERMEDIATE UMMC

## 2017-06-07 PROCEDURE — 85027 COMPLETE CBC AUTOMATED: CPT | Performed by: STUDENT IN AN ORGANIZED HEALTH CARE EDUCATION/TRAINING PROGRAM

## 2017-06-07 PROCEDURE — 27210437 ZZH NUTRITION PRODUCT SEMIELEM INTERMED LITER

## 2017-06-07 PROCEDURE — 36415 COLL VENOUS BLD VENIPUNCTURE: CPT | Performed by: INTERNAL MEDICINE

## 2017-06-07 PROCEDURE — 85610 PROTHROMBIN TIME: CPT | Performed by: INTERNAL MEDICINE

## 2017-06-07 PROCEDURE — 25000132 ZZH RX MED GY IP 250 OP 250 PS 637: Performed by: NURSE PRACTITIONER

## 2017-06-07 PROCEDURE — 25000125 ZZHC RX 250: Performed by: STUDENT IN AN ORGANIZED HEALTH CARE EDUCATION/TRAINING PROGRAM

## 2017-06-07 PROCEDURE — 97110 THERAPEUTIC EXERCISES: CPT | Mod: GP | Performed by: PHYSICAL THERAPIST

## 2017-06-07 PROCEDURE — 40000133 ZZH STATISTIC OT WARD VISIT

## 2017-06-07 PROCEDURE — 94640 AIRWAY INHALATION TREATMENT: CPT | Mod: 76

## 2017-06-07 PROCEDURE — 40000225 ZZH STATISTIC SLP WARD VISIT

## 2017-06-07 PROCEDURE — 85027 COMPLETE CBC AUTOMATED: CPT | Performed by: INTERNAL MEDICINE

## 2017-06-07 PROCEDURE — 40000275 ZZH STATISTIC RCP TIME EA 10 MIN

## 2017-06-07 PROCEDURE — 83605 ASSAY OF LACTIC ACID: CPT | Performed by: INTERNAL MEDICINE

## 2017-06-07 PROCEDURE — 00000146 ZZHCL STATISTIC GLUCOSE BY METER IP

## 2017-06-07 PROCEDURE — 97530 THERAPEUTIC ACTIVITIES: CPT | Mod: GO

## 2017-06-07 PROCEDURE — 25000132 ZZH RX MED GY IP 250 OP 250 PS 637: Performed by: STUDENT IN AN ORGANIZED HEALTH CARE EDUCATION/TRAINING PROGRAM

## 2017-06-07 PROCEDURE — 94640 AIRWAY INHALATION TREATMENT: CPT

## 2017-06-07 PROCEDURE — 97110 THERAPEUTIC EXERCISES: CPT | Mod: GO

## 2017-06-07 RX ADMIN — PANTOPRAZOLE SODIUM 40 MG: 40 TABLET, DELAYED RELEASE ORAL at 09:51

## 2017-06-07 RX ADMIN — ACETAMINOPHEN 650 MG: 325 TABLET, FILM COATED ORAL at 20:10

## 2017-06-07 RX ADMIN — Medication 2 G: at 06:37

## 2017-06-07 RX ADMIN — MULTIVIT AND MINERALS-FERROUS GLUCONATE 9 MG IRON/15 ML ORAL LIQUID 15 ML: at 07:49

## 2017-06-07 RX ADMIN — ACETAMINOPHEN 650 MG: 325 TABLET, FILM COATED ORAL at 00:27

## 2017-06-07 RX ADMIN — GABAPENTIN 200 MG: 250 SOLUTION ORAL at 07:50

## 2017-06-07 RX ADMIN — GABAPENTIN 200 MG: 250 SOLUTION ORAL at 20:10

## 2017-06-07 RX ADMIN — ACETAMINOPHEN 650 MG: 325 TABLET, FILM COATED ORAL at 10:50

## 2017-06-07 RX ADMIN — OYSTER SHELL CALCIUM WITH VITAMIN D 2 TABLET: 500; 200 TABLET, FILM COATED ORAL at 07:49

## 2017-06-07 RX ADMIN — FOLIC ACID 1 MG: 1 TABLET ORAL at 07:49

## 2017-06-07 RX ADMIN — INSULIN ASPART 1 UNITS: 100 INJECTION, SOLUTION INTRAVENOUS; SUBCUTANEOUS at 00:39

## 2017-06-07 RX ADMIN — IPRATROPIUM BROMIDE 0.5 MG: 0.5 SOLUTION RESPIRATORY (INHALATION) at 12:01

## 2017-06-07 RX ADMIN — IPRATROPIUM BROMIDE 0.5 MG: 0.5 SOLUTION RESPIRATORY (INHALATION) at 20:45

## 2017-06-07 RX ADMIN — GABAPENTIN 200 MG: 250 SOLUTION ORAL at 13:32

## 2017-06-07 RX ADMIN — POTASSIUM CHLORIDE 20 MEQ: 1.5 POWDER, FOR SOLUTION ORAL at 06:07

## 2017-06-07 RX ADMIN — IPRATROPIUM BROMIDE 0.5 MG: 0.5 SOLUTION RESPIRATORY (INHALATION) at 08:34

## 2017-06-07 RX ADMIN — DIGOXIN 125 MCG: 125 TABLET ORAL at 07:49

## 2017-06-07 RX ADMIN — ACETAMINOPHEN 650 MG: 325 TABLET, FILM COATED ORAL at 06:18

## 2017-06-07 ASSESSMENT — PAIN DESCRIPTION - DESCRIPTORS
DESCRIPTORS: ACHING

## 2017-06-07 NOTE — PROGRESS NOTES
Electrophysiology Consult Service  Follow up Note   EP Attending:    Date of Service: 6/6/2017     S:We continue to follow this patient for AF management and planning for ICD. We were able to obtain tracings from pre-hospital EMS/Fire teams which confirms her presenting rhythm was VF. She was extubated on 6/4/17 and is awake and neurologically intact. She continues to have episodes of AF which is symptomatic to her. Otherwise, she has been in SR 60-80's. She has temporary pacemaker wire in RIJV that is capped. She is off pressors and per primary team will likely transfer to floor tomorrow.   HPI:   Ms. Michel is a 56 year old female who has a past medical history significant for VSD s/p repair 1969, RA, HTN, and insomnia. She was recently admitted from 5/15/17-5/23/17 with atrial tachyarrhythmias (SVT, AF, AFL) with symptoms of palpitations, fatigue, and nausea that she had intermittently starting 3 weeks prior to admission. An echo today shows LVEF 40-45%, mildly reduced RVEF, moderate biatrial enlargement, mild mitral regurgitation, and moderate tricuspid regurgitation. Prior CMRI from 2015 showed normal function and no significant valvular abnormalities. She was started on Eliquis. She underwent a BRE/DCCV on 5/17/17 c/b hypotension. She then had SVT HR 170s and then went into AF/AFL all within 24 hours after DCCV. She was started on Sotalol and chemically cardioverted. However, she had nausea and thought it was from the Sotalol so this was stopped. She reverted back to AF/AFL and a rate control strategy was adopted. She proved difficult to rate control and remained highly symptomatic. Therefore, she was started on amiodarone. She then converted back to sinus. Patient received an intial IV load followed by 400mg BID plan for 2 weeks then plan to go to 200 mg daily thereafter. She continued to report fatigued and nausea despite being in sinus. Given difficult to control arrhthymias, CMRI done showed  LVEF 55%, mildly dilated RV with focal area of dyskinesis in RVOT, severe bi-atrial enlargement, severe TR, mild/moderate MR, and DE at RV septal insertion site. RFA was discussed but was defered as patient had a UTI at the time with ESBL. CRP was elevated and remained elevated 72 at discharge. She was sent with Macrobid.  She then presented to [facility] via EMS after suffering an out of hospital cardiac arrest. Her  found her gasping for air and unresponsive in bed. He moved her off the bed, started CPR, and activated EMS. Upon EMS arrival, she was reported to be in shockable rhythm. She was externally defibrillated and had ROSC in the field. She was intubated and brought to [facility]. In the U, she was hypotensive and found to be in escape rhythm 43 bpm. She was taken emergently to cath lab where coronary angiogram showed normal coronary arteries. Temporary pacemaker was placed in RA given sinus arrest. She was also placed on therapeutic hypothermia. She required epinephrine and vasopressin gtts. She was admitted to CVICU for further evaluation and management. Her  reports that she had a syncopal episode while sitting in a chair last Friday 5/26/17 that she did not seek care for. He also reports that she had been having nausea, diarrhea, and intermittent vomiting over the last week and generally had not been feeling well over the last month. She has also had saddle anesthesia with lower extremity numbness that has been evaluated by neuro as an outpatient. She was started on gabapentin but no conclusive reason. LFTs elevated likely shock liver. CELSA with Scr 1.62, GFR 33. Trop 0.569 and trended down to 0.457. . Lactic acid elevated throughout the night, still elevated but trending down. Elevated d-dimer. WBC 9.0, Plt 56. INR 3.92. She had episode of AF overnight that was rate controlled that spontaneously terminated. CT chest showed extensive consolidation in the right lower and left lower lobe, may  "represent pulmonary edema and/or infection. Blood cultures have been NTD.  When temporary pacemaker paused showed sinus underlying 50-60's. She continues to be intubated and sedated.   O:   Vitals: /64  Pulse (!) 43  Temp 97.6  F (36.4  C) (Oral)  Resp 18  Ht 1.473 m (4' 10\")  Wt 77.2 kg (170 lb 3.1 oz)  SpO2 100%  BMI 35.57 kg/m2  Gen:  AxO, NAD   Lungs:  CTAB   CV:  RRR.   Abd: NT, ND, +BS,  Soft   Ext:  1+ BLE edema    Data:  Labs:  BMP  Recent Labs  Lab 06/06/17  1654 06/06/17  1156 06/06/17  0409 06/05/17  1941 06/05/17  1603 06/05/17  0354   *  --  146*  --  149* 151*   POTASSIUM 4.1 3.6 3.5 3.8 3.1* 3.5   CHLORIDE 106  --  107  --  107 111*   VIKTORIYA 7.4*  --  7.5*  --  8.2* 7.6*   CO2 36*  --  36*  --  36* 34*   BUN 40*  --  39*  --  35* 39*   CR 0.78  --  0.84  --  0.71 0.79   *  --  138*  --  136* 134*     CBC  Recent Labs  Lab 06/06/17  1654 06/06/17  0409 06/05/17  1603 06/05/17  0531   WBC 3.3* 3.0* 3.5* 3.6*   RBC 2.49* 2.38* 2.29* 2.58*   HGB 8.2* 8.1* 7.5* 8.6*   HCT 26.0* 25.0* 23.9* 27.1*   * 105* 104* 105*   MCH 32.9 34.0* 32.8 33.3*   MCHC 31.5 32.4 31.4* 31.7   RDW 19.0* 19.2* 19.0* 19.2*   PLT 41* 49* 68* 15*     INR  Recent Labs  Lab 06/06/17  0409 06/05/17  0354 06/04/17  0408 06/03/17  1558   INR 1.27* 1.30* 1.30* 1.29*     EKG:     Meds per [facility] EMR:  Current Facility-Administered Medications   Medication     artificial tears ophthalmic ointment     glucose 40 % gel 15-30 g    Or     dextrose 50 % injection 25-50 mL    Or     glucagon injection 1 mg     insulin aspart (NovoLOG) inj (RAPID ACTING)     potassium chloride SA (K-DUR/KLOR-CON M) CR tablet 20-40 mEq     potassium chloride (KLOR-CON) Packet 20-40 mEq     potassium chloride 10 mEq in 100 mL intermittent infusion     potassium chloride 10 mEq in 100 mL intermittent infusion with 10 mg lidocaine     potassium chloride 20 mEq in 50 mL intermittent infusion     magnesium sulfate 2 g in NS intermittent " infusion (PharMEDium or FV Cmpd)     magnesium sulfate 4 g in 100 mL sterile water (premade)     oxyCODONE (ROXICODONE) IR tablet 5 mg     ipratropium (ATROVENT) 0.02 % neb solution 0.5 mg     melatonin tablet 3 mg     HYDROmorphone (DILAUDID) injection 0.2 mg     pantoprazole (PROTONIX) suspension 40 mg     digoxin (LANOXIN) tablet 125 mcg     midazolam (VERSED) injection 1 mg     dextrose 10 % 1,000 mL infusion     multivitamins with minerals (CERTAVITE/CEROVITE) liquid 15 mL     sodium chloride (PF) 0.9% PF flush 3 mL     gabapentin (NEURONTIN) solution 200 mg     medication instruction     sodium phosphate 25 mmol in D5W intermittent infusion     folic acid (FOLVITE) tablet 1 mg     calcium carb 1250 mg (500 mg [place])/vitamin D 200 units (OSCAL with D) per tablet 2 tablet     lidocaine 1 % 1 mL     senna-docusate (SENOKOT-S;PERICOLACE) 8.6-50 MG per tablet 1-2 tablet     acetaminophen (TYLENOL) Suppository 650 mg     acetaminophen (TYLENOL) tablet 650 mg     alum & mag hydroxide-simethicone (MYLANTA ES/MAALOX  ES) suspension 15-30 mL     lidocaine (LMX4) kit     sodium chloride (PF) 0.9% PF flush 3 mL     naloxone (NARCAN) injection 0.1-0.4 mg     sodium phosphate 10 mmol in D5W intermittent infusion     sodium phosphate 15 mmol in D5W intermittent infusion     sodium phosphate 20 mmol in D5W intermittent infusion     dextrose 10 % 1,000 mL infusion     calcium chloride 1 g in NaCl 0.9 % 100 mL intermittent infusion     Echo:  Recent Results (from the past 4320 hour(s))   ECHO LIMITED WITH OPTISON    Narrative    [id number]  [facility]74  [id number]  [id number]^ISSA^LUIS^           [facility]  Echocardiography Laboratory  [address] [place] [address]     Name: FIDELIA MIDDLETON  MRN: [id number]  : 1960  Study Date: 2017 02:27 PM  Age: 56 yrs  Gender: Female  Patient Location: [facility]  Reason For Study: Cardiac Arrest  Ordering Physician: ISSA  LUIS  Performed By: Radah Stokes RDCS     BSA: 1.7 m2  Height: 68 in  Weight: 138 lb  BP: 98/74 mmHg  _____________________________________________________________________________  __        Procedure  Limited Portable Echo Adult. Contrast Optison. Optison ([facility]C #2451-2988-04)  given intravenously. Patient was given 5ml mixture of 1.5ml Definity and 8.5ml  saline. 4 ml wasted.  _____________________________________________________________________________  __        Interpretation Summary  Left ventricular size is normal.  The Ejection Fraction is estimated at 40-45%.  Global right ventricular function is mildly reduced.  There has been no change.  _____________________________________________________________________________  __        Left Ventricle  Left ventricular size is normal. Left ventricular wall thickness is normal.  The Ejection Fraction is estimated at 40-45%. Left ventricular diastolic  function is indeterminate. No regional wall motion abnormalities are seen.     Right Ventricle  The right ventricle is normal size. Global right ventricular function is  mildly reduced.     Atria  Both atria appear normal.        Mitral Valve  The mitral valve is normal.     Aortic Valve  Aortic valve is normal in structure and function.     Tricuspid Valve  Moderate tricuspid insufficiency is present. The right ventricular systolic  pressure is approximated at 34.4 mmHg plus the right atrial pressure.     Pulmonic Valve  The pulmonic valve is normal.     Vessels  The aorta root is normal. The pulmonary artery cannot be assessed. Dilation of  the inferior vena cava is present with normal respiratory variation in  diameter.     Pericardium  No pericardial effusion is present.        Compared to Previous Study  There has been no change.  _____________________________________________________________________________  __     MMode/2D Measurements & Calculations  IVSd: 0.61 cm  LVIDd: 3.8 cm  LVIDs: 2.7 cm  LVPWd: 0.70  cm  FS: 27.4 %  EDV(Teich): 60.3 ml  ESV(Teich): 27.7 ml  LV mass(C)d: 65.3 grams  LV mass(C)dI: 37.4 grams/m2  TAPSE: 1.2 cm        Doppler Measurements & Calculations  TV max P.4 mmHg  TR max robby: 293.3 cm/sec  TR max P.4 mmHg              _____________________________________________________________________________  __        Report approved by: Milly Monsivais 2017 05:05 PM      ECHO COMPLETE WITH OPTISON    Narrative    [id number]  [id number]  [id number]  [id number]^RAMBO^NHAN^KELLI           [facility]  Echocardiography Laboratory  [address] [place] [id number]     Name: FIDELIA MIDDLETON  MRN: [id number]  : 1960  Study Date: 2017 07:11 AM  Age: 56 yrs  Gender: Female  Patient Location: [facility]  Reason For Study: Afib, hx of repaired VSD  Ordering Physician: NHAN RICKS  Performed By: Juan Sanches RDCS     BSA: 1.6 m2  Height: 58 in  Weight: 139 lb  HR: 103  BP: 110/70 mmHg  _____________________________________________________________________________  __        Procedure  Complete Portable Echo Adult. Contrast Optison. Optison (NDC #8829-6541-74)  given intravenously. Patient was given 4 ml mixture of 3 ml Optison and 6 ml  saline. 5 ml wasted.  _____________________________________________________________________________  __        Interpretation Summary  Normal left ventricular size and thickness with mildly reduced systolic  function. EF 45-50% (traced at 48%).  S/P VSD repair. Ventricular septum appears intact by color Doppler.  Normal right ventricular size with mildly reduced function.  Moderate biatrial enlargement is present.  Mild mitral regurgitation and moderate tricuspid regurgitation. The mechanism  of tricuspid regurgitation appears to be tricuspid valve prolapse.  No pericardial effusion.     Compared to the prior study 2015 the LV and RV function are now reduced  and both the MR and most notably, the TR are  new.  _____________________________________________________________________________  __        Left Ventricle  Left ventricular size is normal. Left ventricular wall thickness is normal.  Mildly (EF 45-50%) reduced left ventricular function is present. Normal left  ventricular filling for age. Abnormal non-specific septal motion is present.  Ventricular septum appears intact.     Right Ventricle  The right ventricle is normal size. Global right ventricular function is  mildly reduced.     Atria  Moderate biatrial enlargement is present.     Mitral Valve  Mild mitral annular calcification is present. Mild mitral insufficiency is  present.        Aortic Valve  Mild aortic valve sclerosis is present.     Tricuspid Valve  Moderate tricuspid valve prolapse. Moderate tricuspid insufficiency is  present. Right ventricular systolic pressure is 29mmHg above the right atrial  pressure.     Pulmonic Valve  The pulmonic valve is normal.     Vessels  The aorta root is normal. The inferior vena cava is normal.     Pericardium  No pericardial effusion is present.        Attestation  I have personally viewed the imaging and agree with the interpretation and  report as documented by the fellow, Charly Hamilton, and/or edited by me.  _____________________________________________________________________________  __  MMode/2D Measurements & Calculations     IVSd: 0.87 cm  LVIDd: 4.6 cm  LVIDs: 3.4 cm  LVPWd: 0.79 cm  FS: 24.8 %  EDV(Teich): 95.1 ml  ESV(Teich): 48.3 ml  LV mass(C)d: 122.0 grams  LV mass(C)dI: 78.2 grams/m2  asc Aorta Diam: 2.5 cm  LVOT diam: 1.9 cm  LVOT area: 2.8 cm2  LA Volume (BP): 74.7 ml  LA Volume Index (BP): 47.9 ml/m2           Doppler Measurements & Calculations  MV E max robby: 88.0 cm/sec  MV dec time: 0.18 sec  Ao V2 max: 133.0 cm/sec  Ao max P.0 mmHg  XI(V,D): 2.5 cm2  LV V1 max P.6 mmHg  LV V1 max: 118.0 cm/sec  TR max robby: 255.3 cm/sec  TR max P.1 mmHg  Lateral E/e': 5.1  Medial E/e': 7.5            _____________________________________________________________________________  __           Report approved by: Milly Guzman 05/16/2017 09:07 AM          A:   Ms. Michel is a 56 year old female who has a past medical history significant for VSD s/p repair 1969, RA, HTN, insomnia, and atrial tachyarrhythmias. She was recently admitted from 5/15/17-5/23/17 with symptomatic refractory atrial tachyarrhythmias (SVT, AF, AFL) found to have LVEF 40-45% on echo, 55% on CMRI with severe TR and mild/moderate MR. Found to have UTI during admission. RFA was discussed but deferred due to UTI, elevated CRP, and need for further work up. Ultimately, discharged on Eliquis and amiodarone with plan for close EP follow up. She was now admitted after suffering an out of hospital cardiac arrest requiring externally defibrillation with succesful and had ROSC in the field. Presenting ECG showed escape rhythm 43 bpm. Coronary angiogram showed normal coronary arteries. Temporary pacemaker was placed, and she was placed on therapeutic hypothermia. Further work up shows shock liver, CELSA, elevated CRP, elevated lactic acid, and low plts. CT chest showed extensive consolidation in the right lower and left lower lobe, may represent pulmonary edema and/or infection. She continues to be intubated and sedated.   We continue to follow this patient for AF management and planning for ICD. We were able to obtain tracings from pre-hospital EMS/Fire teams which confirms her presenting rhythm was VF. She was extubated on 6/4/17 and is awake and neurologically intact. She continues to have episodes of AF which is symptomatic to her. Otherwise, she has been in SR 60-80's. She has temporary pacemaker wire in RIJV that is capped. She is off pressors and per primary team will likely transfer to floor tomorrow.   EP recommendations:   Cardiac arrest of unknown etiology  - Unlikely that amiodarone would be cause of arrest. Presenting rhythm did show  sinus shut down; however, she had viable escape that should not result in VT/VF cardiac arrest.   - She appears to have some systemic inflammatory processes which is not well defined at this stage. Only known UTI and now PNA. Consider Rheumatology consult for evaluation of possible RA/SLE flair as possible contributing factors. Consider lyme titers.   - We would recommend dual chamber ICD as secondary prevention of SCD. This would best be done after further recovery from acute issues. We discussed this in detail with patient including indications, risks, and benefits. She states understanding and wishes to proceed when deemed appropriate.   The patient states understanding and is agreeable with plan.   Thank you much for allowing us to participate in the care of this pleasant patient.   This patient was discussed with  and the note above reflects our joint assessment and plan.   ELIZABETH Verde CNP  Electrophysiology Consult Service  Pager: [phone]    EP STAFF NOTE  I have seen and examined the patient as part of a shared visit with GERA Verde NP.  I agree with the note above. I reviewed today's vital signs and medications. I have reviewed and discussed with the advanced practice provider their physical examination, assessment, and plan.  Briefly, patient presenting for VF arrest, no clear etiology. One possibility is a short long short sequence after braking out of afib while on amiodarone with long QT, however, the sinus dysfunction observed is after cardiac arrest and the long QT is during cooling after recovery from cardiac arrest, and the sinus recovered very well after hypothermia reversed, The other possibility are sepsis from recurrent UTI, ? Pneumonia. EF back to normal.   My key history/exam findings are: RRR.   The key management decisions made by me: since no clear reversible cause for VF, potential ICD is indicated, and would pursue dual chamber single coil in case we need AAT or  ablation with possible sinus node dysfunction requiring atrial pacing. Atrial lead needs to come out. Will discuss AAT and/or ablation for afib after further recovery.    Juan Eaton MD [facility]  Cardiology - Electrophysiology

## 2017-06-07 NOTE — PROGRESS NOTES
Social Work Services Progress Note    Hospital Day: 10  Date of Initial Social Work Evaluation:  5/31/17  Collaborated with:  Patient, pt's  Wolf, team rounds, chart review    Data:    Pt continues to make improvements and plan to transfer up to .  Await PM&R consult for discharge recommendations.    Intervention:    Received call from pt's  Wolf ([phone]) stating that he brought in his own FMLA paperwork and pt's short-term disability paperwork.  Also noted that pt's completed FMLA paperwork is missing one provider signature on Workability Form.  Spoke with pt at bedside an unfortunately Wolf had left and did not leave his paperwork, so notified pt that SW will f/u with Wolf tomorrow AM.  Will plan to discus all paperwork with CSI team when all is gathered together.    Assessment:   Pt presents in good spirits and voices appreciation of assistance.    Plan:    Anticipated Disposition:  Facility:  Current recommendation for ARU from PT/OT and await PM&R consult for formal recommendations.    Barriers to d/c plan:  Pt is not yet medically stable.    Follow Up:  SW will f/u with Wolf Thursday AM to obtain his FMLA and pt's short-term disability paperwork for completion.  Will then discuss with CSI team and will fax pt's FMLA paperwork once signature is obtained.  SW continues to follow for support to pt and family, resource referral, and d/c planning.    ANDIE Abbott, [facility]  Adult ICU Clinical   Pager [phone]

## 2017-06-07 NOTE — PLAN OF CARE
Problem: Goal Outcome Summary  Goal: Goal Outcome Summary  OT 4E: Pt making significant gains in therapies. Pt dependently lifted to chair. Pt tolerated unsupported sitting for approximately 15 minutes. Pt engaged in reaching task demonstrated improved UE strength and trunk control. Pt engaged in FMC to progress independence with ADLs. Pt endorsed decreased sensation on dorsal surface of L thumb and decreased against gravity strength, will continue to monitor. Therapist ordered Rooke boots to promote neutral ankle alignment as pt with decreased dorsiflexion and L ankle inversion impacting functional mobility.       Rec ARU as pt presents with deficits requiring PT/OT/SLP, pt is highly motivated to return to PLOF and has strong support system.

## 2017-06-07 NOTE — PROCEDURES
EEG #:  -4         DATE OF STUDY:  06/01/2017         TYPE OF STUDY:  Inpatient video-EEG monitoring.         DURATION OF STUDY:  23 hours 51 minutes 16 seconds.         HISTORY:  Day 4 of video-EEG monitoring in patient Amelia Michel, 56-year-old, who underwent a cardiovascular arrest and underwent therapeutic hypothermia.   She has been warmed now for close to 48 hours.  Low-dose midazolam was discontinued during the study; however, she continues treatment with fentanyl 25-50 mcg per hour.  Finally, because of some of the results early in the study, levetiracetam load was administered twice during this recording.         TECHNICAL SUMMARY:  EEG was recorded from scalp electrodes placed according to the 10-20 international system.  Additional electrodes were utilized for artifact detection, referencing, and recording from additional cerebral regions.  Video was continuously recorded.  Video was reviewed for purposes of clinical correlation and to assist with EEG interpretation.         FINDINGS:  Continuous moderate amplitude delta.  Maximum amplitude of delta is typically in the range of 40-50 microvolts.  There are other periods in which more diffuse alpha activity is seen with some desynchronization.  Amplitude of alpha activity is generally in the 20-30 microvolt range.  These 2 different patterns wax and wane.  A posterior dominant rhythm or sleep spindles are not seen at any point.         Stimulation is delivered according to ICU protocol starting at around 09:30.  This appears to change a background with diffuse alpha into a background with waxing and waning irregular delta activity.  The patient does appear to respond some to stimulation with eye movements, etc., but is not able to follow any commands.         INTERICTAL ABNORMALITY:  Left posterior hemisphere sharp waves and spikes are seen intermittently starting shortly after study onset.  These either occur singly or in brief runs.  Evaluation  with hat band montage indicates O1 phase reversal in most cases indicating amplitude maximum at O1.  Sometimes amplitude maximum appears between T5 and O1.  With CZ referencing, field is confined largely to O1 and T5 with a little field in P3 and minimal at O2.  Video was reviewed during these and no unusual movements are seen.  Levetiracetam is administered, but the persistence of this activity does not appear to decrease any as the study continues.         ICTAL:  No electrographic seizures.         IMPRESSION:  Abnormal.  There is evidence of moderate to marked diffuse encephalopathy.  There is clear change in background and some reactivity.  EEG is continuous.  These are all improvements compared to EEG recorded during hypothermia.         A new development is the emergence of the left posterior hemisphere spikes and sharp waves indicating focal cortical irritability in this area.  In the ICU setting, focal epileptiform activity is associated with increased risk for electrographic or clinical seizures.  However, electrographic or clinical seizures were not seen in this study.      Revised encounter/date lg 17         KRISTOPHER LUU MD             D: 2017 08:48   T: 2017 09:40   MT: ELVER      Name:     FIDELIA MIDDLETON   MRN:      [id number]        Account:        [id number]   :      1960           Procedure Date: 2017      Document: [id number]

## 2017-06-07 NOTE — PROGRESS NOTES
[facility] Hematology Progress Note          Assessment and Plan:   Assessment:  55 yo F with hx HTN, RA (on MTX, Plaquenil, Prednisone), Afib/Flutter, VSD s/p repair who was admitted on 5/29 after out of hospital cardiac arrest with ROSC, now being evaluated for acute on chronic thrombocytopenia. Pt's platelets noted to decrease from 104 on admission 5/29, to 58 on 5/30, to 29 on 6/3, and 15 on 6/5. Her platelets appear to be chronically low in 80s-120s range, likely from her RA immunosuppressants vs. Mild chronic ITP. She had 4T score of 5 (intermediate probability), so HIT antibody was sent which was negative. On review of her blood smear today, we noted increased spherocytosis. With RA, could have Sprague syndrome (AIHA + ITP). Most likely acute on chronic marrow suppression from cardiac arrest + antibiotics.      Plan:  - recommend sending haptoglobin, TODD, Ferritin, repeat retic (ordered for you as AM draw)   - continue holding RA meds      Pt discussed with Dr. Ospina who agrees with plan.      Gabbi Rodarte MD  Hematology Oncology fellow  [phone]         Interval History:   Recovering more everyday. Looking forward to going to rehab.             Review of Systems:   The Review of Systems is negative other than noted in the HPI           Medications:     Current Facility-Administered Medications   Medication     artificial tears ophthalmic ointment     glucose 40 % gel 15-30 g    Or     dextrose 50 % injection 25-50 mL    Or     glucagon injection 1 mg     insulin aspart (NovoLOG) inj (RAPID ACTING)     potassium chloride SA (K-DUR/KLOR-CON M) CR tablet 20-40 mEq     potassium chloride (KLOR-CON) Packet 20-40 mEq     potassium chloride 10 mEq in 100 mL intermittent infusion     potassium chloride 10 mEq in 100 mL intermittent infusion with 10 mg lidocaine     potassium chloride 20 mEq in 50 mL intermittent infusion     magnesium sulfate 2 g in NS intermittent infusion (PharMEDium or FV Cmpd)     magnesium  "sulfate 4 g in 100 mL sterile water (premade)     oxyCODONE (ROXICODONE) IR tablet 5 mg     ipratropium (ATROVENT) 0.02 % neb solution 0.5 mg     melatonin tablet 3 mg     HYDROmorphone (DILAUDID) injection 0.2 mg     pantoprazole (PROTONIX) suspension 40 mg     digoxin (LANOXIN) tablet 125 mcg     dextrose 10 % 1,000 mL infusion     multivitamins with minerals (CERTAVITE/CEROVITE) liquid 15 mL     sodium chloride (PF) 0.9% PF flush 3 mL     gabapentin (NEURONTIN) solution 200 mg     medication instruction     sodium phosphate 25 mmol in D5W intermittent infusion     folic acid (FOLVITE) tablet 1 mg     calcium carb 1250 mg (500 mg Inupiat)/vitamin D 200 units (OSCAL with D) per tablet 2 tablet     lidocaine 1 % 1 mL     senna-docusate (SENOKOT-S;PERICOLACE) 8.6-50 MG per tablet 1-2 tablet     acetaminophen (TYLENOL) Suppository 650 mg     acetaminophen (TYLENOL) tablet 650 mg     alum & mag hydroxide-simethicone (MYLANTA ES/MAALOX  ES) suspension 15-30 mL     lidocaine (LMX4) kit     sodium chloride (PF) 0.9% PF flush 3 mL     naloxone (NARCAN) injection 0.1-0.4 mg     sodium phosphate 10 mmol in D5W intermittent infusion     sodium phosphate 15 mmol in D5W intermittent infusion     sodium phosphate 20 mmol in D5W intermittent infusion     dextrose 10 % 1,000 mL infusion     calcium chloride 1 g in NaCl 0.9 % 100 mL intermittent infusion        /68 (BP Location: Right arm)  Pulse 87  Temp 98.4  F (36.9  C) (Oral)  Resp 18  Ht 1.473 m (4' 10\")  Wt 77.4 kg (170 lb 10.2 oz)  SpO2 96%  BMI 35.66 kg/m2  Gen: Alert, pleasant, NAD   CV: RRR  Resp: breathing comfortably   Ext: WWP         Data:     Lab Results   Component Value Date    WBC 2.6 (L) 06/07/2017    HGB 7.3 (L) 06/07/2017    HCT 23.0 (L) 06/07/2017    PLT 33 (LL) 06/07/2017     (H) 06/07/2017    POTASSIUM 3.9 06/07/2017    CHLORIDE 107 06/07/2017    CO2 34 (H) 06/07/2017    BUN 34 (H) 06/07/2017    CR 0.72 06/07/2017     (H) 06/07/2017    " SED 34 (H) 05/30/2017    DD 4.9 (H) 06/01/2017    NTBNPI 2583 (H) 05/29/2017    TROPONIN 0.19 (HH) 05/29/2017    TROPI 0.381 (HH) 05/30/2017    AST 43 06/05/2017     (H) 06/05/2017    ALKPHOS 149 06/05/2017    BILITOTAL 1.7 (H) 06/05/2017    INR 1.27 (H) 06/07/2017          Gabbi Rodarte MD

## 2017-06-07 NOTE — PROGRESS NOTES
CLINICAL NUTRITION SERVICES - REASSESSMENT NOTE     Nutrition Prescription    RECOMMENDATIONS FOR MDs/PROVIDERS TO ORDER:  Ongoing free water flush adjustments per MD pending Na trends    Malnutrition Status:    Pt does not meet criteria    Recommendations already ordered by Registered Dietitian (RD):  Increased free water flushes to 60 ml q2h  Magic Cups PRN    Future/Additional Recommendations:  1. If/when diet continues to adv, offer appropriate ONS and order calorie counts once eating >25% of meals   2. Continue to monitor WOC notes for need to add further micronutrient supplementation (i.e. Vitamin C, Vitamin A, Zinc)     EVALUATION OF THE PROGRESS TOWARD GOALS   Diet: [place] thick full liquids per SLP    Nutrition Support: Peptamen 1.5 @ goal 40 ml/hr (960 ml/day) to provide 1440 kcals (30 kcal/kg), 65 g PRO (1.4 g/kg), 739 ml free H2O, 54 g Fat (70% from MCTs), 180 g CHO and no Fiber daily.    Intake: TF reached goal rate on 6/2. Since at goal, pt has received 5-day average of 894 mL Peptamen 1.5 daily to provide 1341 kcal (28 kcal/kg) and 61 g PRO (1.3 g/kg)     NEW FINDINGS   -Resp: extubated 6/4.   -Wt: elevated @ 77.4 kg (lowest wt remains 63.5 kg from 5/29)  -GI: 1-2 stools/day daily x 3 days.   -Skin: On certavite for micronutrient supplementation. Jay 15. Per WOC 6/6, large area of epidermal loss with potential full thickness skin loss at area of necrosis 2/2 extravastion.  -Labs/Meds: Na 148 - consistently hypernatremic. Currently on free water flushes 60 mL q4h. Phos intermittently low @ 1.7-2.1 yesterday, 2 on 6/5. Currently WNL today. Receiving IV replacements.    ASSESSED NUTRITION NEEDS  Estimated Protein Needs: 62 - 72+ grams protein/day (1.3 - 1.5+ grams of pro/kg)  Justification: Hypercatabolism with critical illness, skin status/healing    MALNUTRITION  % Intake: No decreased intake noted  % Weight Loss: None noted  Subcutaneous Fat Loss: None observed  Muscle Loss: None observed  Fluid  Accumulation/Edema: Mild LE edema  Malnutrition Diagnosis: Patient does not meet two of the above criteria necessary for diagnosing malnutrition    Previous Goals   Total avg nutritional intake to meet a minimum of 25 kcal/kg and 1.2 g PRO/kg daily (per dosing wt 48 kg).  Evaluation: Met    Previous Nutrition Diagnosis  Inadequate protein-energy intake related to poor appetite/nausea PTA and NPO status w/ intubation as evidenced by NPO x 3 days with no nutrition support yet started, reports of decreased intakes since 5/15.     Evaluation: Resolved    CURRENT NUTRITION DIAGNOSIS  Predicted inadequate nutrient intake (kcal/pro/free water) related to reliance on TF to meet needs as evidenced by potential for TF interruptions to meet < estimated needs, consistent hypernatremia possibly r/t free water deficit      INTERVENTIONS  Implementation  Collaboration with other providers - Discussed pt w/ MD who requested RD to increase free water flushes.   Feeding tube flush - Increased free water to 60 mL q2h.  Medical Food Supplement Therapy - PRN Magic Cups per pt request    Goals  Total avg nutritional intake to meet a minimum of 25 kcal/kg and 1.3 g PRO/kg daily (per dosing wt 48 kg).    Monitoring/Evaluation  Progress toward goals will be monitored and evaluated per protocol.    Chelsea Tang RD, LD, CNSC  CVICU Dietitian  Pager: [phone]

## 2017-06-07 NOTE — PLAN OF CARE
Problem: Goal Outcome Summary  Goal: Goal Outcome Summary  OT/Edema: Pt with 3+ pitting edema in LEs. Pt with large visual reductions in LEs, pt reporting LEs much smaller. GCBs replaced from MTPs to knee creases to help reduce edema and risk for skin breakdown. Please remove garment if it gets soiled or if pt c/o LE pain or numbness, will check back in 48 hrs.

## 2017-06-07 NOTE — PLAN OF CARE
Problem: Goal Outcome Summary  Goal: Goal Outcome Summary  ST 4E: Recommend continue tube feeds as primary nutrition source and initiate nectar-thick full liquid diet as tolerated. Pt to sit upright for all PO intake, take small bites/sips and alternate consistencies. Pt declined trials of thin liquids today due to concern for possible aspiration. ST to follow for PO tolerance and to assist with diet advance as appropriate.

## 2017-06-07 NOTE — PLAN OF CARE
Problem: Goal Outcome Summary  Goal: Goal Outcome Summary  PT 4E: Pt progressing in therapy. Pt stood with MODA-MAXA x 2 twice, up to 60 sec. Pt yet too weak to weight shift and step, but demonstrates strong motivation. Pt further performed seated ex. Continue to recommend nursing use of sling/lift for transferring pt out of bed.      Recommend ARU given pt very independent at baseline, highly motivated and presents with intensive PT needs. Pt tolerated 60 min visit today.

## 2017-06-07 NOTE — PLAN OF CARE
Problem: Goal Outcome Summary  Goal: Goal Outcome Summary  Outcome: Improving  VSS, afebrile overnight. Pt A&Ox4, follows commands.  Generalized pains to L elbow pains, gave 650mg Tylenol x2. Motor strength still weak, however able to wiggle toes,  hands and hold arms up against gravity. Pt continues on 2L NC sats %, using IS 10-15 times q4hrs while sleeping, up to 500ml.  Using IS independently however with encouragement to get started.  LS fine crackles to clear with coughing and suctioning up clear secretions via yankauer, diminished at bases bilaterally.  HR 60-70s, afib vs SR with rare PVCs, MAP 70-90.  MDs pulled TVP approx 1930 at bedside, no issues with bradycardia. UOP 30-70ml/hr, rectal pouch remains in place. Plan is to transfer pt to 6th floor today per bed availability.  Electrolytes replaced, will continue to update MD on any changes in condition per protocol.

## 2017-06-07 NOTE — PROGRESS NOTES
CARDIOLOGY PROGRESS NOTE    SUBJECTIVE:  Had RIJV transvenous pacing wire removed. EP team recommended ICD after recovery from acute issues; also suggested that etiology could be a lengthy pause after converting from AFib leading to short-long-short sequence and subsequent polymorphic VT; sepsis from recurrent UTI/PNA. Speech team evaluated her and recommended continued NPO. PT/OT recommended rehab in acute rehab unit after discharge.     ROS otherwise negative.    OBJECTIVE:  Vital signs:  111/64 (Range /56-91)  HR 75 ()  18 (RR 14-22)  SpO2 100% on 2L/min   97.7 (Tm 98.7)   170 lbs ( 170 lbs)     I/O: 420 mL  Intake: 2409 mL (960 enteral, 880 enteral, 570 NG)  Output:  urine    PHYSICAL EXAM:  Gen: Looks well, lying flat  HEENT: MMM, RIJV Cordis   Resp: Less tachypneic but still talking in short sentences, chest ausc anteriorly with fewer crackles  CVS: JVP still mid-neck   Abdo: ND, S, NT, +BS  Extremities: Warm, well-perfused, still somewhat edematous  Neuro: GCS 15/15     Lab Test  17   0422   HGB  7.1*  (8.1)   HCT  22.0* (25)   WBC  2.6* (3.0)   MCV  105*    MCH  33.8*    MCHC  32.3   RDW  19.4*   PLT  26* (49)    NA  148* (146)    POTASSIUM  3.6 (4.1)   CHLORIDE  108 (106)   CO2  36* (36)   BUN  37* (40)   CR  0.75 (0.78)   GLC  135* (110-190)   VIKTORIYA  7.4*   ALBUMIN   --    BILITOTAL   --    ALKPHOS   --    AST   --    ALT   --    INR 1.27   (313)  NSE 23.1    HIT panel negative     Micro:   BCx right arm: NG x 5d   BCx right arm: NG x 5d    Imagin/06 UE arterial Duplex: results awaited    US abdomen:   1. Borderline gallbladder wall thickening, nonspecific, and likely related to recent arrest/resuscitation.  2. Right pleural effusion.     Cardiac meds: Digoxin 125 micrograms q24h  Non-cardiac meds: Calcium carbonate-vit D, folic acid, gabapentin, insulin, ipratropium, senna-docusate  Drips:  nil    ASSESSMENT/PLAN:  Mrs. Amelia Michel is a 56-year  old lady with a PMHx of VSD repair (1969), RA, and a recent diagnosis of AFib/AFlutter/SVT who presented to [facility] on 05/29 after OOH cardiac arrest with shockable rhythm. After ROSC in the field, she was transferred to [facility] for further management. Extubated 06/04.         - Hypoxic respiratory failure   - s/p 7-day course of meropenem/ertapenem (05/30-06/04)   - Further furosemide 60 mg IV x1 06/07, if needed, to keep net even or slightly net negative    - Transfer to floor 06/07       - Out-of-hospital cardiac arrest and cardiogenic shock; Hx of AFib/Aflutter/SVT; sinus arrest   - Continue digoxin 0.125 mg q24h; will increase to 0.25 mg q24h if rate consistently >120    - No BB or CCB for now    - EP team to consider AICD placement later this week    - Severe critical illness myopathy   - OT/PT              - Follow swallow results       - Severe pancytopenia   - Likely a combination of severe liver injury as well as drug-induced (vancomycin during this admission and heparin during last admission)   - Platelet goal >20K                - Ischemic liver injury complicated by coagulopathy   - Feeding via NGT                           Chronic/inactive issues:  - ESBL UTI, aspiration pneumonia: s/p meropenem/ertapenem course  - CELSA: Baseline Cr 0.7-0.8; peak Cr 1.9  - RA: Weaning of stress dosed steroids completed 06/04; hold immunosuppressants   - Seizure: levetiracetam discontinued 06/05    Seen and staffed with Dr. Wood.    Gilbert Og  Cardiology Fellow  Pager: [phone]      I have seen and examined the patient with the CSI team. I agree with the assessment and plan of the note above.I have reviewed pertinent labs.     Sundar Wood MD  Interventional Cardiology  Pager: [phone]

## 2017-06-07 NOTE — PLAN OF CARE
Problem: Goal Outcome Summary  Goal: Goal Outcome Summary  Outcome: Improving  Cordis Dc this AM per order. Pt in SR mostly with periods of afib. Up to chair x2 with lift. Orders for 6C. Report called to Topher POTTER. Pt will transport with RN.  at bedside and aware of transfer. All belongings sent with patient.

## 2017-06-08 ENCOUNTER — APPOINTMENT (OUTPATIENT)
Dept: SPEECH THERAPY | Facility: CLINIC | Age: 57
DRG: 260 | End: 2017-06-08
Attending: NURSE PRACTITIONER
Payer: COMMERCIAL

## 2017-06-08 ENCOUNTER — APPOINTMENT (OUTPATIENT)
Dept: PHYSICAL THERAPY | Facility: CLINIC | Age: 57
DRG: 260 | End: 2017-06-08
Attending: NURSE PRACTITIONER
Payer: COMMERCIAL

## 2017-06-08 ENCOUNTER — APPOINTMENT (OUTPATIENT)
Dept: OCCUPATIONAL THERAPY | Facility: CLINIC | Age: 57
DRG: 260 | End: 2017-06-08
Attending: NURSE PRACTITIONER
Payer: COMMERCIAL

## 2017-06-08 ENCOUNTER — APPOINTMENT (OUTPATIENT)
Dept: GENERAL RADIOLOGY | Facility: CLINIC | Age: 57
DRG: 260 | End: 2017-06-08
Attending: NURSE PRACTITIONER
Payer: COMMERCIAL

## 2017-06-08 LAB
ANION GAP SERPL CALCULATED.3IONS-SCNC: 3 MMOL/L (ref 3–14)
ANION GAP SERPL CALCULATED.3IONS-SCNC: 5 MMOL/L (ref 3–14)
BUN SERPL-MCNC: 28 MG/DL (ref 7–30)
BUN SERPL-MCNC: 30 MG/DL (ref 7–30)
CALCIUM SERPL-MCNC: 7.6 MG/DL (ref 8.5–10.1)
CALCIUM SERPL-MCNC: 7.8 MG/DL (ref 8.5–10.1)
CHLORIDE SERPL-SCNC: 107 MMOL/L (ref 94–109)
CHLORIDE SERPL-SCNC: 110 MMOL/L (ref 94–109)
CO2 SERPL-SCNC: 30 MMOL/L (ref 20–32)
CO2 SERPL-SCNC: 33 MMOL/L (ref 20–32)
CREAT SERPL-MCNC: 0.69 MG/DL (ref 0.52–1.04)
CREAT SERPL-MCNC: 0.73 MG/DL (ref 0.52–1.04)
DAT POLY-SP REAG RBC QL: NORMAL
ERYTHROCYTE [DISTWIDTH] IN BLOOD BY AUTOMATED COUNT: 19.1 % (ref 10–15)
ERYTHROCYTE [DISTWIDTH] IN BLOOD BY AUTOMATED COUNT: 19.1 % (ref 10–15)
FERRITIN SERPL-MCNC: 1952 NG/ML (ref 8–252)
GFR SERPL CREATININE-BSD FRML MDRD: 83 ML/MIN/1.7M2
GFR SERPL CREATININE-BSD FRML MDRD: 87 ML/MIN/1.7M2
GLUCOSE BLDC GLUCOMTR-MCNC: 123 MG/DL (ref 70–99)
GLUCOSE BLDC GLUCOMTR-MCNC: 129 MG/DL (ref 70–99)
GLUCOSE BLDC GLUCOMTR-MCNC: 138 MG/DL (ref 70–99)
GLUCOSE BLDC GLUCOMTR-MCNC: 145 MG/DL (ref 70–99)
GLUCOSE BLDC GLUCOMTR-MCNC: 150 MG/DL (ref 70–99)
GLUCOSE BLDC GLUCOMTR-MCNC: 161 MG/DL (ref 70–99)
GLUCOSE BLDC GLUCOMTR-MCNC: 180 MG/DL (ref 70–99)
GLUCOSE SERPL-MCNC: 131 MG/DL (ref 70–99)
GLUCOSE SERPL-MCNC: 146 MG/DL (ref 70–99)
HAPTOGLOB SERPL-MCNC: 59 MG/DL (ref 15–200)
HCT VFR BLD AUTO: 22.3 % (ref 35–47)
HCT VFR BLD AUTO: 25.5 % (ref 35–47)
HGB BLD-MCNC: 7.3 G/DL (ref 11.7–15.7)
HGB BLD-MCNC: 8.4 G/DL (ref 11.7–15.7)
INR PPP: 1.23 (ref 0.86–1.14)
LACTATE BLD-SCNC: 1.4 MMOL/L (ref 0.7–2.1)
LDH SERPL L TO P-CCNC: 258 U/L (ref 81–234)
MCH RBC QN AUTO: 34 PG (ref 26.5–33)
MCH RBC QN AUTO: 34 PG (ref 26.5–33)
MCHC RBC AUTO-ENTMCNC: 32.7 G/DL (ref 31.5–36.5)
MCHC RBC AUTO-ENTMCNC: 32.9 G/DL (ref 31.5–36.5)
MCV RBC AUTO: 103 FL (ref 78–100)
MCV RBC AUTO: 104 FL (ref 78–100)
PLATELET # BLD AUTO: 27 10E9/L (ref 150–450)
PLATELET # BLD AUTO: 30 10E9/L (ref 150–450)
POTASSIUM SERPL-SCNC: 4 MMOL/L (ref 3.4–5.3)
POTASSIUM SERPL-SCNC: 4.1 MMOL/L (ref 3.4–5.3)
RBC # BLD AUTO: 2.15 10E12/L (ref 3.8–5.2)
RBC # BLD AUTO: 2.47 10E12/L (ref 3.8–5.2)
RETICS # AUTO: 27.8 10E9/L (ref 25–95)
RETICS/RBC NFR AUTO: 1.3 % (ref 0.5–2)
SODIUM SERPL-SCNC: 142 MMOL/L (ref 133–144)
SODIUM SERPL-SCNC: 145 MMOL/L (ref 133–144)
WBC # BLD AUTO: 2.5 10E9/L (ref 4–11)
WBC # BLD AUTO: 2.9 10E9/L (ref 4–11)

## 2017-06-08 PROCEDURE — 94640 AIRWAY INHALATION TREATMENT: CPT | Mod: 76 | Performed by: OPTOMETRIST

## 2017-06-08 PROCEDURE — 82728 ASSAY OF FERRITIN: CPT | Performed by: INTERNAL MEDICINE

## 2017-06-08 PROCEDURE — 85610 PROTHROMBIN TIME: CPT | Performed by: INTERNAL MEDICINE

## 2017-06-08 PROCEDURE — 80048 BASIC METABOLIC PNL TOTAL CA: CPT | Performed by: INTERNAL MEDICINE

## 2017-06-08 PROCEDURE — 97110 THERAPEUTIC EXERCISES: CPT | Mod: GP

## 2017-06-08 PROCEDURE — 83010 ASSAY OF HAPTOGLOBIN QUANT: CPT | Performed by: INTERNAL MEDICINE

## 2017-06-08 PROCEDURE — 97530 THERAPEUTIC ACTIVITIES: CPT | Mod: GO | Performed by: OCCUPATIONAL THERAPIST

## 2017-06-08 PROCEDURE — 25000132 ZZH RX MED GY IP 250 OP 250 PS 637: Performed by: INTERNAL MEDICINE

## 2017-06-08 PROCEDURE — 25000132 ZZH RX MED GY IP 250 OP 250 PS 637

## 2017-06-08 PROCEDURE — 93005 ELECTROCARDIOGRAM TRACING: CPT

## 2017-06-08 PROCEDURE — 93010 ELECTROCARDIOGRAM REPORT: CPT | Mod: 77 | Performed by: INTERNAL MEDICINE

## 2017-06-08 PROCEDURE — 40000193 ZZH STATISTIC PT WARD VISIT

## 2017-06-08 PROCEDURE — 25000125 ZZHC RX 250: Performed by: STUDENT IN AN ORGANIZED HEALTH CARE EDUCATION/TRAINING PROGRAM

## 2017-06-08 PROCEDURE — 36415 COLL VENOUS BLD VENIPUNCTURE: CPT | Performed by: PHYSICAL MEDICINE & REHABILITATION

## 2017-06-08 PROCEDURE — 36415 COLL VENOUS BLD VENIPUNCTURE: CPT | Performed by: INTERNAL MEDICINE

## 2017-06-08 PROCEDURE — 83615 LACTATE (LD) (LDH) ENZYME: CPT | Performed by: INTERNAL MEDICINE

## 2017-06-08 PROCEDURE — 99232 SBSQ HOSP IP/OBS MODERATE 35: CPT | Mod: GC | Performed by: INTERNAL MEDICINE

## 2017-06-08 PROCEDURE — 86880 COOMBS TEST DIRECT: CPT | Performed by: INTERNAL MEDICINE

## 2017-06-08 PROCEDURE — 25000128 H RX IP 250 OP 636: Performed by: STUDENT IN AN ORGANIZED HEALTH CARE EDUCATION/TRAINING PROGRAM

## 2017-06-08 PROCEDURE — 25000132 ZZH RX MED GY IP 250 OP 250 PS 637: Performed by: STUDENT IN AN ORGANIZED HEALTH CARE EDUCATION/TRAINING PROGRAM

## 2017-06-08 PROCEDURE — 40000275 ZZH STATISTIC RCP TIME EA 10 MIN: Performed by: OPTOMETRIST

## 2017-06-08 PROCEDURE — 97530 THERAPEUTIC ACTIVITIES: CPT | Mod: GP

## 2017-06-08 PROCEDURE — 27210437 ZZH NUTRITION PRODUCT SEMIELEM INTERMED LITER

## 2017-06-08 PROCEDURE — 25000132 ZZH RX MED GY IP 250 OP 250 PS 637: Performed by: NURSE PRACTITIONER

## 2017-06-08 PROCEDURE — 40000275 ZZH STATISTIC RCP TIME EA 10 MIN

## 2017-06-08 PROCEDURE — 93010 ELECTROCARDIOGRAM REPORT: CPT | Performed by: INTERNAL MEDICINE

## 2017-06-08 PROCEDURE — 40000225 ZZH STATISTIC SLP WARD VISIT: Performed by: SPEECH-LANGUAGE PATHOLOGIST

## 2017-06-08 PROCEDURE — 94640 AIRWAY INHALATION TREATMENT: CPT

## 2017-06-08 PROCEDURE — 85027 COMPLETE CBC AUTOMATED: CPT | Performed by: INTERNAL MEDICINE

## 2017-06-08 PROCEDURE — 71010 XR CHEST PORT 1 VW: CPT

## 2017-06-08 PROCEDURE — 83605 ASSAY OF LACTIC ACID: CPT | Performed by: PHYSICAL MEDICINE & REHABILITATION

## 2017-06-08 PROCEDURE — 21400006 ZZH R&B CCU INTERMEDIATE UMMC

## 2017-06-08 PROCEDURE — 85045 AUTOMATED RETICULOCYTE COUNT: CPT | Performed by: INTERNAL MEDICINE

## 2017-06-08 PROCEDURE — 40000133 ZZH STATISTIC OT WARD VISIT: Performed by: OCCUPATIONAL THERAPIST

## 2017-06-08 PROCEDURE — 92526 ORAL FUNCTION THERAPY: CPT | Mod: GN | Performed by: SPEECH-LANGUAGE PATHOLOGIST

## 2017-06-08 PROCEDURE — 00000146 ZZHCL STATISTIC GLUCOSE BY METER IP

## 2017-06-08 RX ORDER — FUROSEMIDE 10 MG/ML
60 INJECTION INTRAMUSCULAR; INTRAVENOUS ONCE
Status: COMPLETED | OUTPATIENT
Start: 2017-06-08 | End: 2017-06-08

## 2017-06-08 RX ORDER — METOPROLOL TARTRATE 25 MG/1
25 TABLET, FILM COATED ORAL 2 TIMES DAILY
Status: DISCONTINUED | OUTPATIENT
Start: 2017-06-08 | End: 2017-06-08

## 2017-06-08 RX ORDER — GABAPENTIN 100 MG/1
200 CAPSULE ORAL 3 TIMES DAILY
Status: DISCONTINUED | OUTPATIENT
Start: 2017-06-08 | End: 2017-06-16 | Stop reason: HOSPADM

## 2017-06-08 RX ORDER — DIGOXIN 125 MCG
125 TABLET ORAL ONCE
Status: COMPLETED | OUTPATIENT
Start: 2017-06-08 | End: 2017-06-08

## 2017-06-08 RX ORDER — DIGOXIN 125 MCG
250 TABLET ORAL DAILY
Status: DISCONTINUED | OUTPATIENT
Start: 2017-06-08 | End: 2017-06-08

## 2017-06-08 RX ORDER — DIGOXIN 125 MCG
125 TABLET ORAL DAILY
Status: DISCONTINUED | OUTPATIENT
Start: 2017-06-08 | End: 2017-06-08

## 2017-06-08 RX ORDER — METOPROLOL TARTRATE 25 MG/1
25 TABLET, FILM COATED ORAL 2 TIMES DAILY
Status: DISCONTINUED | OUTPATIENT
Start: 2017-06-09 | End: 2017-06-09

## 2017-06-08 RX ORDER — METOPROLOL TARTRATE 1 MG/ML
5 INJECTION, SOLUTION INTRAVENOUS ONCE
Status: COMPLETED | OUTPATIENT
Start: 2017-06-08 | End: 2017-06-08

## 2017-06-08 RX ORDER — METOPROLOL TARTRATE 25 MG/1
25 TABLET, FILM COATED ORAL ONCE
Status: COMPLETED | OUTPATIENT
Start: 2017-06-08 | End: 2017-06-08

## 2017-06-08 RX ORDER — DIGOXIN 125 MCG
250 TABLET ORAL DAILY
Status: DISCONTINUED | OUTPATIENT
Start: 2017-06-09 | End: 2017-06-16 | Stop reason: HOSPADM

## 2017-06-08 RX ADMIN — METOPROLOL TARTRATE 5 MG: 5 INJECTION INTRAVENOUS at 16:42

## 2017-06-08 RX ADMIN — DIGOXIN 125 MCG: 0.12 TABLET ORAL at 08:37

## 2017-06-08 RX ADMIN — DIGOXIN 125 MCG: 0.12 TABLET ORAL at 16:22

## 2017-06-08 RX ADMIN — INSULIN ASPART 1 UNITS: 100 INJECTION, SOLUTION INTRAVENOUS; SUBCUTANEOUS at 08:43

## 2017-06-08 RX ADMIN — FUROSEMIDE 60 MG: 10 INJECTION, SOLUTION INTRAVENOUS at 15:24

## 2017-06-08 RX ADMIN — PANTOPRAZOLE SODIUM 40 MG: 40 TABLET, DELAYED RELEASE ORAL at 08:39

## 2017-06-08 RX ADMIN — GABAPENTIN 200 MG: 250 SOLUTION ORAL at 08:37

## 2017-06-08 RX ADMIN — GABAPENTIN 200 MG: 250 SOLUTION ORAL at 13:21

## 2017-06-08 RX ADMIN — OYSTER SHELL CALCIUM WITH VITAMIN D 2 TABLET: 500; 200 TABLET, FILM COATED ORAL at 08:39

## 2017-06-08 RX ADMIN — INSULIN ASPART 1 UNITS: 100 INJECTION, SOLUTION INTRAVENOUS; SUBCUTANEOUS at 17:32

## 2017-06-08 RX ADMIN — METOPROLOL TARTRATE 25 MG: 25 TABLET, FILM COATED ORAL at 19:34

## 2017-06-08 RX ADMIN — DIGOXIN 125 MCG: 0.12 TABLET ORAL at 00:40

## 2017-06-08 RX ADMIN — MULTIVIT AND MINERALS-FERROUS GLUCONATE 9 MG IRON/15 ML ORAL LIQUID 15 ML: at 08:38

## 2017-06-08 RX ADMIN — ACETAMINOPHEN 650 MG: 325 TABLET, FILM COATED ORAL at 15:24

## 2017-06-08 RX ADMIN — IPRATROPIUM BROMIDE 0.5 MG: 0.5 SOLUTION RESPIRATORY (INHALATION) at 20:33

## 2017-06-08 RX ADMIN — GABAPENTIN 200 MG: 100 CAPSULE ORAL at 19:33

## 2017-06-08 RX ADMIN — INSULIN ASPART 1 UNITS: 100 INJECTION, SOLUTION INTRAVENOUS; SUBCUTANEOUS at 20:00

## 2017-06-08 RX ADMIN — IPRATROPIUM BROMIDE 0.5 MG: 0.5 SOLUTION RESPIRATORY (INHALATION) at 09:24

## 2017-06-08 RX ADMIN — ACETAMINOPHEN 650 MG: 325 TABLET, FILM COATED ORAL at 19:34

## 2017-06-08 RX ADMIN — FOLIC ACID 1 MG: 1 TABLET ORAL at 08:38

## 2017-06-08 RX ADMIN — IPRATROPIUM BROMIDE 0.5 MG: 0.5 SOLUTION RESPIRATORY (INHALATION) at 15:16

## 2017-06-08 ASSESSMENT — PAIN DESCRIPTION - DESCRIPTORS: DESCRIPTORS: ACHING

## 2017-06-08 NOTE — PROGRESS NOTES
CLINICAL NUTRITION SERVICES     Nutrition Prescription    RECOMMENDATIONS FOR MDs/PROVIDERS TO ORDER:  Rec provider adjust free water flushes as needed, pending fluid status.    Recommendations already ordered by Registered Dietitian (RD):  Kcal counts    Future/Additional Recommendations:  For all recommendations, see prior nutrition notes.    Once pt is consuming 25% of meal trays, consider transitioning to cycled TFs. Rec Peptamen 1.5 at 55 mL/hr x 12 hrs which provides 660 mL TF, 990 kcals and 45 g protein daily, approximately 75% of kcal and protein needs. TFs may be adjusted pending oral intake. Once kcal counts reveal that pt is consuming at least 900 kcals and 45 g protein daily on average, discontinue TFs.        INTERVENTIONS:  Implementation:  Ordered kcal counts 6/8-6/11.     Follow up/Monitoring:  Will continue to follow pt.    Inés Brown, MS, RD, LD, [facility]   6C Pgr:  [phone]

## 2017-06-08 NOTE — PLAN OF CARE
Problem: Goal Outcome Summary  Goal: Goal Outcome Summary  RADHA: Pt with h/y of HTN, RA, Afib/aflutter, VSD, repair 1969 , thrombocytopenia (plateletes , here due to cardiac arrest (5/29) per MD note. Pt alert and oriented.Pt converted to A /flutter with HR at 147; 152  at approximately midnight, asymptomatic,no nausea and fatique,no pain, /73, MD notified,ECG done,   Digoxin 125 mcg given per MD order.  At 12:50 pt converted to SR with HR at 98. LWs on BLE. Continues on FT gtt at 40 ml/hr.     Plan: Continue to monitor,notify MD as needed. Plan for ICD placement for SCD.

## 2017-06-08 NOTE — PROGRESS NOTES
[facility] Hematology Progress Note          Assessment and Plan:   Assessment:  55 yo F with hx HTN, RA (on MTX, Plaquenil, Prednisone), Afib/Flutter, VSD s/p repair who was admitted on 5/29 after out of hospital cardiac arrest with ROSC, now being evaluated for acute on chronic thrombocytopenia. Pt's platelets noted to decrease from 104 on admission 5/29, to 58 on 5/30, to 29 on 6/3, and 15 on 6/5. Her platelets appear to be chronically low in 80s-120s range, likely from her RA immunosuppressants vs. Mild chronic ITP. She had 4T score of 5 (intermediate probability), so HIT antibody was sent which was negative.     With history of RA, ITP as cause for acute on chronic thrombocytopenia is a consideration. This is a diagnosis of exclusion. We did evaluate for AIHA with negative workup (haptoglobin normal, TODD negative). She has high Ferritin suggestive of continued inflammation which is likely causing global bone marrow suppression (as evidenced by low retic count). Her immunosuppressive medications for RA are likely hindering her bone marrow response. We discussed that options include watching and waiting for bone marrow to recover, or empirically treating for ITP with short course of steroids. Second approach would be more helpful if she needed a procedure that required platelets to be higher. Pt states that she is being discussed for possible ICD. Bree states that she gets very edematous with steroids.      Plan:  - consider empirically treating for ITP with short course of steroids- this would have more benefit if her platelets needed to be higher for any upcoming procedures. Transfusing plts as needed is also ok.   - agree with plt goal of > 20 for now  - continue holding RA meds      Pt discussed with Dr. Ospina who agrees with plan.      Gabbi Rodarte MD  Hematology Oncology fellow  [phone]         Interval History:   Feeling tired today. Worked hard with PT and OT. No bleeding or bruising that she has  noticed.             Review of Systems:   The Review of Systems is negative other than noted in the HPI           Medications:     Current Facility-Administered Medications   Medication     [START ON 6/9/2017] digoxin (LANOXIN) tablet 250 mcg     [START ON 6/9/2017] metoprolol (LOPRESSOR) tablet 25 mg     metoprolol (LOPRESSOR) tablet 25 mg     artificial tears ophthalmic ointment     glucose 40 % gel 15-30 g    Or     dextrose 50 % injection 25-50 mL    Or     glucagon injection 1 mg     insulin aspart (NovoLOG) inj (RAPID ACTING)     potassium chloride SA (K-DUR/KLOR-CON M) CR tablet 20-40 mEq     potassium chloride (KLOR-CON) Packet 20-40 mEq     potassium chloride 10 mEq in 100 mL intermittent infusion     potassium chloride 10 mEq in 100 mL intermittent infusion with 10 mg lidocaine     potassium chloride 20 mEq in 50 mL intermittent infusion     magnesium sulfate 2 g in NS intermittent infusion (PharMEDium or FV Cmpd)     magnesium sulfate 4 g in 100 mL sterile water (premade)     oxyCODONE (ROXICODONE) IR tablet 5 mg     ipratropium (ATROVENT) 0.02 % neb solution 0.5 mg     melatonin tablet 3 mg     HYDROmorphone (DILAUDID) injection 0.2 mg     pantoprazole (PROTONIX) suspension 40 mg     dextrose 10 % 1,000 mL infusion     multivitamins with minerals (CERTAVITE/CEROVITE) liquid 15 mL     sodium chloride (PF) 0.9% PF flush 3 mL     gabapentin (NEURONTIN) solution 200 mg     medication instruction     sodium phosphate 25 mmol in D5W intermittent infusion     folic acid (FOLVITE) tablet 1 mg     calcium carb 1250 mg (500 mg [place])/vitamin D 200 units (OSCAL with D) per tablet 2 tablet     lidocaine 1 % 1 mL     senna-docusate (SENOKOT-S;PERICOLACE) 8.6-50 MG per tablet 1-2 tablet     acetaminophen (TYLENOL) Suppository 650 mg     acetaminophen (TYLENOL) tablet 650 mg     alum & mag hydroxide-simethicone (MYLANTA ES/MAALOX  ES) suspension 15-30 mL     lidocaine (LMX4) kit     sodium chloride (PF) 0.9% PF flush 3  "mL     naloxone (NARCAN) injection 0.1-0.4 mg     sodium phosphate 10 mmol in D5W intermittent infusion     sodium phosphate 15 mmol in D5W intermittent infusion     sodium phosphate 20 mmol in D5W intermittent infusion     dextrose 10 % 1,000 mL infusion     calcium chloride 1 g in NaCl 0.9 % 100 mL intermittent infusion        /69  Pulse 99  Temp 98  F (36.7  C) (Oral)  Resp 18  Ht 1.473 m (4' 10\")  Wt 84.3 kg (185 lb 14.4 oz)  SpO2 90%  BMI 38.85 kg/m2  Gen: Alert, pleasant, NAD   CV: RRR  Resp: breathing comfortably   Ext: WWP  Skin: no bruising          Data:     Lab Results   Component Value Date    WBC 2.9 (L) 06/08/2017    HGB 8.4 (L) 06/08/2017    HCT 25.5 (L) 06/08/2017    PLT 30 (LL) 06/08/2017     06/08/2017    POTASSIUM 4.0 06/08/2017    CHLORIDE 107 06/08/2017    CO2 30 06/08/2017    BUN 28 06/08/2017    CR 0.73 06/08/2017     (H) 06/08/2017    SED 34 (H) 05/30/2017    DD 4.9 (H) 06/01/2017    NTBNPI 2583 (H) 05/29/2017    TROPONIN 0.19 (HH) 05/29/2017    TROPI 0.381 (HH) 05/30/2017    AST 43 06/05/2017     (H) 06/05/2017    ALKPHOS 149 06/05/2017    BILITOTAL 1.7 (H) 06/05/2017    INR 1.23 (H) 06/08/2017          Gabbi Rodarte MD    "

## 2017-06-08 NOTE — PLAN OF CARE
"Problem: Goal Outcome Summary  Goal: Goal Outcome Summary  Outcome: No Change  /68 (BP Location: Right arm)  Pulse 87  Temp 98.4  F (36.9  C) (Oral)  Resp 18  Ht 1.473 m (4' 10\")  Wt 77.4 kg (170 lb 10.2 oz)  SpO2 96%  BMI 35.66 kg/m2     Pt transferred from  vital signs stable on 1L nasal canula, sinus rhythm.    A/O, mechanical lift, brand catheter, incontinent of bowel.        "

## 2017-06-08 NOTE — PLAN OF CARE
Problem: Goal Outcome Summary  Goal: Goal Outcome Summary  OT: REC ARU.  Pt presents with general deconditioning, decreased activity tolerance and functional endurance resulting in decreased ADL I.  Pt requiring max A x2 for bed mobility supine <> EOB.  Pt maintained seated posture for ~20 minutes while EOB and completing grooming and hygiene tasks.

## 2017-06-08 NOTE — PROGRESS NOTES
CARDIOLOGY PROGRESS NOTE    SUBJECTIVE:  Transferred to floor. Had atrial flutter overnight requiring an extra dose of digoxin.     Hematology: Concerned for bone marrow suppression vs. AIHA stemming from sepsis and cardiac arrest as the etiology of pancytopenia.   Arterial duplex showing resolution of left arterial clot.   Swallow advanced to thickened liquids    ROS otherwise negative.    OBJECTIVE:  Vital signs:  127/63 (Range 102-145/63-90)  99 (HR 87-99)  18 (RR 18-22)  97.5 (Tm 99)  SpO2 % on 1.5L/min    185 lbs ( 170 lbs)    I/O: 525 mL  Intake: 1864 mL intake (840 enteral, 200 enteral, 200 blood components via one unit platelets, )  Output: 1340 urine output    PHYSICAL EXAM:  Gen: More dyspneic this morning  HEENT: MMM  Resp: Tachypneic but talking in short sentences, diffuse wheeze and fine insp creps  CVS: JVD to mid-neck, S1+S2 without added sounds or murmurs  Abdo: ND, S, NT, +BS  Extremities: Warm, well-perfused, no edema  Neuro: GCS 15/15, AAOx3     Lab Test  17   0320   HGB  7.3* (7.8)   HCT  22.3* (23.6)   WBC  2.5* (2.9)   MCV  104*   MCH  34.0*   MCHC  32.7   RDW  19.1*   PLT  27* (26)   NA  145* (148)   POTASSIUM  4.1 (3.6)   CHLORIDE  110* (108)   CO2  33* (36)   BUN  30 (37)   CR  0.69 (0.8)   GLC  131*   VIKTORIYA  7.6*   ALBUMIN   --    BILITOTAL   --    ALKPHOS   --    AST   --    ALT   --    -131   Haptoglobin  TODD negative   Ferritin 1952     Repeat retic ct 1.3%  INR 1.23    S100-B protein: 1794   Thiopurine metabolites: <50     Imagin/07 US arterial Duplex:   1. Resolution of occluded left radial artery.  2. Focal stenosis of the right radial artery at the wrist with peak systolic velocity 486 cm/s.  3. 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  Cardiac meds: Digoxin 125 micrograms daily   Non-cardiac meds: Calcium carbonate-Vit D, gabapentin,  ipratropium, insulin, pantoprazole 40 mg q24h, senna-docusate  Drips:  Nil     ASSESSMENT/PLAN:  Mrs. Amelia Michel is a 56-year old lady with a PMHx of VSD repair (1969), RA, and a recent diagnosis of AFib/AFlutter/SVT who presented to [facility] on 05/29 after OOH cardiac arrest with shockable rhythm. After ROSC in the field, she was transferred to [facility] for further management. Extubated 06/04.          - Hypoxic respiratory failure   - s/p 7-day course of meropenem/ertapenem (05/30-06/04)   - Further furosemide 60 mg IV x1 06/08      - Out-of-hospital cardiac arrest and cardiogenic shock; Hx of AFib/Aflutter/SVT; sinus arrest   - Increase digoxin to 0.25 mg q24h given multiple episodes of AFib/AFlutter   - No BB or CCB for now    - ICD before discharge      - Severe critical illness myopathy   - OT/PT      - Severe pancytopenia   - Platelet goal >20K                 - A/w haptoglobin     - Ischemic liver injury complicated by coagulopathy   - Feeding via NGT      Chronic/inactive issues:  - ESBL UTI, aspiration pneumonia: s/p meropenem/ertapenem course  - CELSA: Baseline Cr 0.7-0.8; peak Cr 1.9  - RA: Weaning of stress dosed steroids completed 06/04; hold immunosuppressants   - Seizure: levetiracetam discontinued 06/05    Seen and staffed with Dr. Wood.    Gilbert Og  Cardiology Fellow  Pager: [phone]            I have seen and examined the patient with the CSI team. I agree with the assessment and plan of the note above.I have reviewed pertinent labs.     Sundar Wood MD  Interventional Cardiology  Pager: [phone]

## 2017-06-08 NOTE — PROGRESS NOTES
Social Work Services Progress Note    Hospital Day: 11  Date of Initial Social Work Evaluation:  5/31/17  Collaborated with:  ICU SW, CSI, rounds, chart reviewed     Data:    Patient is a 56 year old female who SW is following for ARU placement.     Intervention:    PM&R consult in place to determine DC placement needs. Patient has been referred to [facility] by primary SW. Patient does have preferred one so [facility] will need prior Auth. SW received FMLA and short-term disability paperwork from ICU SW for both patient and pt's  for CSI team to complete. SW did page team and speak with CSI requesting that FMLA/Short-term disbaility paperwork be completed. SW will then fax paperwork to appropriate places. SW will continue to follow to provide support with paperwork.       Plan:    Anticipated Disposition:  Facility:  Current rec. ARU from PT/OT. Awaiting PM&R consult to determine DC plan    Barriers to d/c plan:  Medical stability, bed availability    Follow Up:  SW will continue to follow for support with FMLA and short-term disability paperwork for patient and pt's . SW will also continue to provide support with DC planning.     SARAH James  [facility] , Coverage 6C  PH: [phone]  Pgr: [phone]

## 2017-06-08 NOTE — CONSULTS
[facility]   PM&R CONSULT    Consulting Provider: Lenka Hunt   Reason for Consult: Assessment of rehabilitation   Location of Patient: 6C  Date of Encounter: 6/8/2017       ASSESSMENT/PLAN:    Ms. Amelia Michel is a Right handed 56 year old yo female who presents with severe Deconditioning/debility following a cardiac arrest requiring ECMO.   She has significant deconditioning, which is not consistent with her length of hospitalization. She has impaired cognition as well. She is being followed by Neurology for seizures.   At present she is not appropriate for an ARU setting. Recommend ATU setting on discharge She may benefit from further Neurology consultation to rule out anoxic brain injury and work up of the LUE weakness.     HPI:    Mrs. Amelia Michel is a 56-year old female with a PMHx of VSD repair (1969), RA, on chronic immunosuppression,  and a recent diagnosis of AFib/AFlutter/SVT who presented to [facility] on 05/29 after OOH cardiac arrest with shockable rhythm.   After ROSC in the field, she was transferred to [facility] for further management on 5/29/2017. Underwent therapeutic hypothermia in CV ICU. Was noted to have epitliform activity on EEG without actual seizures. Placed on Keppra ppx. Seen by Neurology and underwent EEG consistent with diffuse encephalopathy, with Left posterior hemisphere epielptiiform activity continues indicating focal cortical irritability in this region. No overt seizures. She required pressure support.  Extubated 06/04.    Other issues have been thrombocytopenia with anemia, has required Platelet transfusions. 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      PREVIOUS LEVEL OF FUNCTION   She was independent with basic mobility and basic ADL's prior to admit.     CURRENT FUNCTION   PT  Pt stood with MODA-MAXA x 2 twice, up to 60 sec. Pt yet too weak to weight shift and step, but demonstrates strong  motivation. Pt further performed seated ex.   Continue to recommend nursing use of sling/lift for transferring pt out of bed.     OT  Managing edema     SLP  Initiated nectar thickened liquids.     SOCIAL HISTORY/Home Setting/Support:  Lives with spouse, in a house with 2 AMIE and 0 within.  works but can take time off as needed.     Social History     Social History     Marital status:      Spouse name: Romeo Michel     Number of children: 0     Years of education: N/A     Occupational History      [employer]     Social History Main Topics     Smoking status: Never Smoker     Smokeless tobacco: Never Used     Alcohol use Yes      Comment: Glass of wine two evenings a night     Drug use: No     Sexual activity: Not on file     Other Topics Concern     Parent/Sibling W/ Cabg, Mi Or Angioplasty Before 65f 55m? 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   She was independent prior to admit.   She works for [employer]   She lives in [place] with 2 [facility] and main living areas on the main level.       Past Medical History:  Past Medical History:   Diagnosis Date     HTN (hypertension)      Primary pulmonary hypertension (H)      Rheumatoid arthritis(714.0)            Current Medications:  Current Facility-Administered Medications   Medication     digoxin (LANOXIN) tablet 125 mcg     artificial tears ophthalmic ointment     glucose 40 % gel 15-30 g    Or     dextrose 50 % injection 25-50 mL    Or     glucagon injection 1 mg     insulin aspart (NovoLOG) inj (RAPID ACTING)     potassium chloride SA (K-DUR/KLOR-CON M) CR tablet 20-40 mEq     potassium chloride (KLOR-CON) Packet 20-40 mEq     potassium chloride 10 mEq in 100 mL intermittent infusion     potassium chloride 10 mEq in 100 mL intermittent infusion with 10 mg lidocaine     potassium chloride 20 mEq in 50 mL intermittent infusion     magnesium sulfate 2 g in NS intermittent infusion  ([facility] or [facility] Cmpd)     magnesium sulfate 4 g in 100 mL sterile water (premade)     oxyCODONE (ROXICODONE) IR tablet 5 mg     ipratropium (ATROVENT) 0.02 % neb solution 0.5 mg     melatonin tablet 3 mg     HYDROmorphone (DILAUDID) injection 0.2 mg     pantoprazole (PROTONIX) suspension 40 mg     dextrose 10 % 1,000 mL infusion     multivitamins with minerals (CERTAVITE/CEROVITE) liquid 15 mL     sodium chloride (PF) 0.9% PF flush 3 mL     gabapentin (NEURONTIN) solution 200 mg     medication instruction     sodium phosphate 25 mmol in D5W intermittent infusion     folic acid (FOLVITE) tablet 1 mg     calcium carb 1250 mg (500 mg [place])/vitamin D 200 units (OSCAL with D) per tablet 2 tablet     lidocaine 1 % 1 mL     senna-docusate (SENOKOT-S;PERICOLACE) 8.6-50 MG per tablet 1-2 tablet     acetaminophen (TYLENOL) Suppository 650 mg     acetaminophen (TYLENOL) tablet 650 mg     alum & mag hydroxide-simethicone (MYLANTA ES/MAALOX  ES) suspension 15-30 mL     lidocaine (LMX4) kit     sodium chloride (PF) 0.9% PF flush 3 mL     naloxone (NARCAN) injection 0.1-0.4 mg     sodium phosphate 10 mmol in D5W intermittent infusion     sodium phosphate 15 mmol in D5W intermittent infusion     sodium phosphate 20 mmol in D5W intermittent infusion     dextrose 10 % 1,000 mL infusion     calcium chloride 1 g in NaCl 0.9 % 100 mL intermittent infusion         Review of Systems:  Total of ten systems reviewed, pertinent positives and negatives as follows  Feels generally fatigued  Denies any tingling or numbness  No problems with bladder prior to admit, she has Huynh in place.   LBM today, somewhat loose   No chest pain. No cough or SOB.  No visual changes.   Poor appetite   Tube feeds ongoing   No headache or photophobia.   No nausea, or abdominal pain.  Slept poorly last night   Remainder of the review of the systems was negative.          Labs   Lab Results   Component Value Date    WBC 2.5 (L) 06/08/2017    HGB 7.3 (L)  "06/08/2017    HCT 22.3 (L) 06/08/2017     (H) 06/08/2017    PLT 27 (LL) 06/08/2017     Lab Results   Component Value Date     (H) 06/08/2017    POTASSIUM 4.1 06/08/2017    CHLORIDE 110 (H) 06/08/2017    CO2 33 (H) 06/08/2017     (H) 06/08/2017     Lab Results   Component Value Date    GFRESTIMATED 87 06/08/2017    GFRESTBLACK >90   GFR Calc   06/08/2017     Lab Results   Component Value Date    AST 43 06/05/2017     (H) 06/05/2017    ALKPHOS 149 06/05/2017    BILITOTAL 1.7 (H) 06/05/2017    BILICONJ 0.0 12/06/2005     Lab Results   Component Value Date    INR 1.23 (H) 06/08/2017     Lab Results   Component Value Date    BUN 30 06/08/2017    CR 0.69 06/08/2017         ON EXAMINATION:  Vitals:    06/08/17 0547 06/08/17 0723 06/08/17 0826 06/08/17 0924   BP:  132/90 122/89    BP Location:  Right arm     Pulse:       Resp:  18 18    Temp:  98.4  F (36.9  C)     TempSrc:  Oral     SpO2:  98%  96%   Weight: 84.3 kg (185 lb 14.4 oz)      Height:           Physical Exam:  Blood pressure 122/89, pulse 99, temperature 98.4  F (36.9  C), temperature source Oral, resp. rate 18, height 1.473 m (4' 10\"), weight 84.3 kg (185 lb 14.4 oz), SpO2 96 %, not currently breastfeeding.    GEN: NAD, she is lying comfortably in bed  She is alert, appropriate, cooperative  Speech is   Comprehension is  HEENT: NCAT  MSK: generalized muscle atrophy noted  ABD: soft, non tender, pos BS  NEURO:   CRANIAL NERVES: Discs flat. Pupils equal, round and reactive to light.  Extraocular movements full. Visual fields full. Face moves symmetrically.  Tongue midline. Hearing intact to finger rubbing.    Strength: proximal weakness, but mild weaker in the RUE    Cognition: fund of knowledge and train of thought appropriate, able to process  SKIN: no rashes or lesions noted.   EXT: edema bilaterally  Thank you for the consult. Please call for any questions. Pager number [phone]     Priscilla Shanks       Total " of 70 min spent in this encounter, more than 50% in counseling and coordination.

## 2017-06-08 NOTE — PLAN OF CARE
Problem: Goal Outcome Summary  Goal: Goal Outcome Summary  D: 5/29 admission d/t OOH cardiac arrest. Acute on chronic thrombocytopenia eval. Transfer from ICU 6/7.  I/A: VSS except for intermittent asymptomatic SVT, HR up to 180s. Denies pain except for that in left arm extravasation site. Dyspneic. 02 sats high 90s on 1-2L NC. Ambulates to commode with Ass of 2, walker. Generalized edema. Lymph wraps on bilateral lower extremities. Tubefeeds tolerated at 40ml/hr. Denies nausea. 1x loose BM, incontinent. Diet advanced to dysphagia level I. Nectar thickened liquids.  Calorie counts initiated today. Left extravasation site changed per POC. 60mg IV lasix given, good UO per brand. 1x additional 125mcg digoxin (daily 125mcg dose) and 5mg IV metoprolol given when HR up to 180s. 12lead EKG and CXR done also. Currently SR 70s. Will continue POC, CSI primary.

## 2017-06-08 NOTE — PLAN OF CARE
Problem: Goal Outcome Summary  Goal: Goal Outcome Summary  Pt seen for dysphagia therapy. Pt demonstrating progress however not back to baseline function. Recommend advancing pt to Dysphagia diet level 1 (puree) with Nectar thickened liquids. Sit pt upright for po intake. Encourage small bites/sips and slow rate. Alternate bites of solids and sips of liquids. SLP to follow per POC.

## 2017-06-08 NOTE — PLAN OF CARE
Problem: Goal Outcome Summary  Goal: Goal Outcome Summary  PT / 6C: Pt requires Mod-MaxA x 2 for supine > sitting EOB, however MaxA x 2 for sitting EOB > supine. Pt performs x4 sit <> stand transfers with Jayden-ModA x 2. Pt with standing tolerance of ~60 seconds with FWW, requires cues for body positioning and posture. Pt performed weight-shifting and single heel raises with FWW + Jayden-ModA x 2. Pt engaged in seated LE exercises at EOB.  Recommend ARU given pt very independent at baseline, highly motivated and presents with intensive PT needs. Pt tolerated 60 min visit today.

## 2017-06-08 NOTE — PROGRESS NOTES
SPIRITUAL HEALTH SERVICES Progress Note  [facility] ([place]) 6C    Routine visit with pt per length of stay. Pt was alert and appreciated my previous visits with her while in ICU. Pt acknowledged she is making so progress and expressed gratitude for prayers and support on her behalf. After our conversation, I shared a healing prayer with pt and blessed her. This unit  will continue to f/u if needed.                                                                                                                                            Father Dirk Magdaleno

## 2017-06-09 ENCOUNTER — APPOINTMENT (OUTPATIENT)
Dept: MRI IMAGING | Facility: CLINIC | Age: 57
DRG: 260 | End: 2017-06-09
Attending: PSYCHIATRY & NEUROLOGY
Payer: COMMERCIAL

## 2017-06-09 ENCOUNTER — APPOINTMENT (OUTPATIENT)
Dept: OCCUPATIONAL THERAPY | Facility: CLINIC | Age: 57
DRG: 260 | End: 2017-06-09
Attending: NURSE PRACTITIONER
Payer: COMMERCIAL

## 2017-06-09 ENCOUNTER — APPOINTMENT (OUTPATIENT)
Dept: PHYSICAL THERAPY | Facility: CLINIC | Age: 57
DRG: 260 | End: 2017-06-09
Attending: NURSE PRACTITIONER
Payer: COMMERCIAL

## 2017-06-09 PROBLEM — J96.01 ACUTE RESPIRATORY FAILURE WITH HYPOXIA (H): Status: ACTIVE | Noted: 2017-06-09

## 2017-06-09 LAB
ABO + RH BLD: NORMAL
ABO + RH BLD: NORMAL
ANION GAP SERPL CALCULATED.3IONS-SCNC: 2 MMOL/L (ref 3–14)
ANION GAP SERPL CALCULATED.3IONS-SCNC: 4 MMOL/L (ref 3–14)
BLD GP AB SCN SERPL QL: NORMAL
BLD PROD TYP BPU: NORMAL
BLD UNIT ID BPU: 0
BLD UNIT ID BPU: 0
BLOOD BANK CMNT PATIENT-IMP: NORMAL
BLOOD PRODUCT CODE: NORMAL
BLOOD PRODUCT CODE: NORMAL
BPU ID: NORMAL
BPU ID: NORMAL
BUN SERPL-MCNC: 28 MG/DL (ref 7–30)
BUN SERPL-MCNC: 31 MG/DL (ref 7–30)
CALCIUM SERPL-MCNC: 7.5 MG/DL (ref 8.5–10.1)
CALCIUM SERPL-MCNC: 7.5 MG/DL (ref 8.5–10.1)
CHLORIDE SERPL-SCNC: 104 MMOL/L (ref 94–109)
CHLORIDE SERPL-SCNC: 107 MMOL/L (ref 94–109)
CK SERPL-CCNC: 23 U/L (ref 30–225)
CO2 SERPL-SCNC: 30 MMOL/L (ref 20–32)
CO2 SERPL-SCNC: 33 MMOL/L (ref 20–32)
CREAT SERPL-MCNC: 0.67 MG/DL (ref 0.52–1.04)
CREAT SERPL-MCNC: 0.74 MG/DL (ref 0.52–1.04)
ERYTHROCYTE [DISTWIDTH] IN BLOOD BY AUTOMATED COUNT: 19.2 % (ref 10–15)
ERYTHROCYTE [DISTWIDTH] IN BLOOD BY AUTOMATED COUNT: 19.6 % (ref 10–15)
GFR SERPL CREATININE-BSD FRML MDRD: 81 ML/MIN/1.7M2
GFR SERPL CREATININE-BSD FRML MDRD: ABNORMAL ML/MIN/1.7M2
GLUCOSE BLDC GLUCOMTR-MCNC: 109 MG/DL (ref 70–99)
GLUCOSE BLDC GLUCOMTR-MCNC: 115 MG/DL (ref 70–99)
GLUCOSE BLDC GLUCOMTR-MCNC: 145 MG/DL (ref 70–99)
GLUCOSE BLDC GLUCOMTR-MCNC: 145 MG/DL (ref 70–99)
GLUCOSE BLDC GLUCOMTR-MCNC: 147 MG/DL (ref 70–99)
GLUCOSE BLDC GLUCOMTR-MCNC: 148 MG/DL (ref 70–99)
GLUCOSE SERPL-MCNC: 112 MG/DL (ref 70–99)
GLUCOSE SERPL-MCNC: 149 MG/DL (ref 70–99)
HCT VFR BLD AUTO: 20.7 % (ref 35–47)
HCT VFR BLD AUTO: 32.1 % (ref 35–47)
HGB BLD-MCNC: 10.4 G/DL (ref 11.7–15.7)
HGB BLD-MCNC: 6.9 G/DL (ref 11.7–15.7)
INR PPP: 1.22 (ref 0.86–1.14)
INTERPRETATION ECG - MUSE: NORMAL
INTERPRETATION ECG - MUSE: NORMAL
LDH SERPL L TO P-CCNC: 237 U/L (ref 81–234)
MCH RBC QN AUTO: 31.7 PG (ref 26.5–33)
MCH RBC QN AUTO: 34 PG (ref 26.5–33)
MCHC RBC AUTO-ENTMCNC: 32.4 G/DL (ref 31.5–36.5)
MCHC RBC AUTO-ENTMCNC: 33.3 G/DL (ref 31.5–36.5)
MCV RBC AUTO: 102 FL (ref 78–100)
MCV RBC AUTO: 98 FL (ref 78–100)
NUM BPU REQUESTED: 2
PLATELET # BLD AUTO: 23 10E9/L (ref 150–450)
PLATELET # BLD AUTO: 28 10E9/L (ref 150–450)
POTASSIUM SERPL-SCNC: 3.9 MMOL/L (ref 3.4–5.3)
POTASSIUM SERPL-SCNC: 4.1 MMOL/L (ref 3.4–5.3)
RBC # BLD AUTO: 2.03 10E12/L (ref 3.8–5.2)
RBC # BLD AUTO: 3.28 10E12/L (ref 3.8–5.2)
SODIUM SERPL-SCNC: 138 MMOL/L (ref 133–144)
SODIUM SERPL-SCNC: 142 MMOL/L (ref 133–144)
SPECIMEN EXP DATE BLD: NORMAL
TRANSFUSION STATUS PATIENT QL: NORMAL
WBC # BLD AUTO: 2.8 10E9/L (ref 4–11)
WBC # BLD AUTO: 2.9 10E9/L (ref 4–11)

## 2017-06-09 PROCEDURE — 85027 COMPLETE CBC AUTOMATED: CPT | Performed by: INTERNAL MEDICINE

## 2017-06-09 PROCEDURE — 70551 MRI BRAIN STEM W/O DYE: CPT

## 2017-06-09 PROCEDURE — 40000133 ZZH STATISTIC OT WARD VISIT: Performed by: OCCUPATIONAL THERAPIST

## 2017-06-09 PROCEDURE — 94640 AIRWAY INHALATION TREATMENT: CPT

## 2017-06-09 PROCEDURE — 83615 LACTATE (LD) (LDH) ENZYME: CPT | Performed by: INTERNAL MEDICINE

## 2017-06-09 PROCEDURE — 00000146 ZZHCL STATISTIC GLUCOSE BY METER IP

## 2017-06-09 PROCEDURE — 72141 MRI NECK SPINE W/O DYE: CPT

## 2017-06-09 PROCEDURE — 40000275 ZZH STATISTIC RCP TIME EA 10 MIN

## 2017-06-09 PROCEDURE — 36415 COLL VENOUS BLD VENIPUNCTURE: CPT | Performed by: INTERNAL MEDICINE

## 2017-06-09 PROCEDURE — 21400006 ZZH R&B CCU INTERMEDIATE UMMC

## 2017-06-09 PROCEDURE — 25000132 ZZH RX MED GY IP 250 OP 250 PS 637: Performed by: INTERNAL MEDICINE

## 2017-06-09 PROCEDURE — 86923 COMPATIBILITY TEST ELECTRIC: CPT | Performed by: INTERNAL MEDICINE

## 2017-06-09 PROCEDURE — 85610 PROTHROMBIN TIME: CPT | Performed by: INTERNAL MEDICINE

## 2017-06-09 PROCEDURE — 86900 BLOOD TYPING SEROLOGIC ABO: CPT | Performed by: INTERNAL MEDICINE

## 2017-06-09 PROCEDURE — 80048 BASIC METABOLIC PNL TOTAL CA: CPT | Performed by: INTERNAL MEDICINE

## 2017-06-09 PROCEDURE — 40000274 ZZH STATISTIC RCP CONSULT EA 30 MIN

## 2017-06-09 PROCEDURE — 40000133 ZZH STATISTIC OT WARD VISIT

## 2017-06-09 PROCEDURE — 82550 ASSAY OF CK (CPK): CPT | Performed by: INTERNAL MEDICINE

## 2017-06-09 PROCEDURE — 97535 SELF CARE MNGMENT TRAINING: CPT | Mod: GO

## 2017-06-09 PROCEDURE — 97140 MANUAL THERAPY 1/> REGIONS: CPT | Mod: GO | Performed by: OCCUPATIONAL THERAPIST

## 2017-06-09 PROCEDURE — 40000193 ZZH STATISTIC PT WARD VISIT: Performed by: PHYSICAL THERAPIST

## 2017-06-09 PROCEDURE — 27210437 ZZH NUTRITION PRODUCT SEMIELEM INTERMED LITER

## 2017-06-09 PROCEDURE — 86850 RBC ANTIBODY SCREEN: CPT | Performed by: INTERNAL MEDICINE

## 2017-06-09 PROCEDURE — 25000132 ZZH RX MED GY IP 250 OP 250 PS 637: Performed by: NURSE PRACTITIONER

## 2017-06-09 PROCEDURE — 97530 THERAPEUTIC ACTIVITIES: CPT | Mod: GP | Performed by: PHYSICAL THERAPIST

## 2017-06-09 PROCEDURE — 86901 BLOOD TYPING SEROLOGIC RH(D): CPT | Performed by: INTERNAL MEDICINE

## 2017-06-09 PROCEDURE — P9016 RBC LEUKOCYTES REDUCED: HCPCS | Performed by: INTERNAL MEDICINE

## 2017-06-09 PROCEDURE — 25000132 ZZH RX MED GY IP 250 OP 250 PS 637

## 2017-06-09 PROCEDURE — 25000128 H RX IP 250 OP 636: Performed by: INTERNAL MEDICINE

## 2017-06-09 PROCEDURE — 85018 HEMOGLOBIN: CPT | Performed by: INTERNAL MEDICINE

## 2017-06-09 PROCEDURE — 25000125 ZZHC RX 250: Performed by: STUDENT IN AN ORGANIZED HEALTH CARE EDUCATION/TRAINING PROGRAM

## 2017-06-09 PROCEDURE — 25000132 ZZH RX MED GY IP 250 OP 250 PS 637: Performed by: STUDENT IN AN ORGANIZED HEALTH CARE EDUCATION/TRAINING PROGRAM

## 2017-06-09 RX ORDER — ONDANSETRON 4 MG/1
4 TABLET, ORALLY DISINTEGRATING ORAL EVERY 6 HOURS PRN
Status: DISCONTINUED | OUTPATIENT
Start: 2017-06-09 | End: 2017-06-16 | Stop reason: HOSPADM

## 2017-06-09 RX ORDER — PROPRANOLOL HYDROCHLORIDE 10 MG/1
10 TABLET ORAL 3 TIMES DAILY
Status: DISCONTINUED | OUTPATIENT
Start: 2017-06-09 | End: 2017-06-11

## 2017-06-09 RX ORDER — FUROSEMIDE 10 MG/ML
60 INJECTION INTRAMUSCULAR; INTRAVENOUS
Status: DISCONTINUED | OUTPATIENT
Start: 2017-06-09 | End: 2017-06-11

## 2017-06-09 RX ORDER — ONDANSETRON 2 MG/ML
4 INJECTION INTRAMUSCULAR; INTRAVENOUS EVERY 6 HOURS PRN
Status: DISCONTINUED | OUTPATIENT
Start: 2017-06-09 | End: 2017-06-16 | Stop reason: HOSPADM

## 2017-06-09 RX ADMIN — OYSTER SHELL CALCIUM WITH VITAMIN D 2 TABLET: 500; 200 TABLET, FILM COATED ORAL at 10:29

## 2017-06-09 RX ADMIN — PROPRANOLOL HYDROCHLORIDE 10 MG: 10 TABLET ORAL at 14:48

## 2017-06-09 RX ADMIN — ACETAMINOPHEN 650 MG: 325 TABLET, FILM COATED ORAL at 21:18

## 2017-06-09 RX ADMIN — SENNOSIDES AND DOCUSATE SODIUM 2 TABLET: 8.6; 5 TABLET ORAL at 10:28

## 2017-06-09 RX ADMIN — METOPROLOL TARTRATE 25 MG: 25 TABLET, FILM COATED ORAL at 10:28

## 2017-06-09 RX ADMIN — GABAPENTIN 200 MG: 100 CAPSULE ORAL at 21:19

## 2017-06-09 RX ADMIN — FUROSEMIDE 60 MG: 10 INJECTION, SOLUTION INTRAVENOUS at 10:22

## 2017-06-09 RX ADMIN — FUROSEMIDE 60 MG: 10 INJECTION, SOLUTION INTRAVENOUS at 16:44

## 2017-06-09 RX ADMIN — POTASSIUM CHLORIDE 20 MEQ: 750 TABLET, EXTENDED RELEASE ORAL at 21:19

## 2017-06-09 RX ADMIN — INSULIN ASPART 1 UNITS: 100 INJECTION, SOLUTION INTRAVENOUS; SUBCUTANEOUS at 00:10

## 2017-06-09 RX ADMIN — DIGOXIN 250 MCG: 125 TABLET ORAL at 10:27

## 2017-06-09 RX ADMIN — MULTIVIT AND MINERALS-FERROUS GLUCONATE 9 MG IRON/15 ML ORAL LIQUID 15 ML: at 14:48

## 2017-06-09 RX ADMIN — FOLIC ACID 1 MG: 1 TABLET ORAL at 10:29

## 2017-06-09 RX ADMIN — IPRATROPIUM BROMIDE 0.5 MG: 0.5 SOLUTION RESPIRATORY (INHALATION) at 08:39

## 2017-06-09 RX ADMIN — ACETAMINOPHEN 650 MG: 325 TABLET, FILM COATED ORAL at 03:26

## 2017-06-09 RX ADMIN — MULTIVIT AND MINERALS-FERROUS GLUCONATE 9 MG IRON/15 ML ORAL LIQUID 15 ML: at 10:27

## 2017-06-09 RX ADMIN — ONDANSETRON 4 MG: 2 INJECTION INTRAMUSCULAR; INTRAVENOUS at 21:09

## 2017-06-09 RX ADMIN — PROPRANOLOL HYDROCHLORIDE 10 MG: 10 TABLET ORAL at 21:18

## 2017-06-09 RX ADMIN — GABAPENTIN 200 MG: 100 CAPSULE ORAL at 10:25

## 2017-06-09 RX ADMIN — PANTOPRAZOLE SODIUM 40 MG: 40 TABLET, DELAYED RELEASE ORAL at 10:27

## 2017-06-09 RX ADMIN — GABAPENTIN 200 MG: 100 CAPSULE ORAL at 14:48

## 2017-06-09 ASSESSMENT — PAIN DESCRIPTION - DESCRIPTORS: DESCRIPTORS: ACHING

## 2017-06-09 NOTE — PROGRESS NOTES
Critical lab Hemoglobin 6.9 and platelet count 28. MD notified. 1Unit PRBCs ordered and released to blood bank. To transfuse when available.

## 2017-06-09 NOTE — PLAN OF CARE
Problem: Goal Outcome Summary  Goal: Goal Outcome Summary  OT/6C: Completed [place] Cognitive Assessment to verify cognitive status and provide education to pt. Pt achieved score of 26/30 on MoCA which is within normal cognitive status; mild deficits noted in STM and verbal fluency. Pt supine > sit mod Ax2. Pt maintained balance EOB for ~ 15 min SBA. Pt transferred via dependent lift to chair Ax2 for sling placement. Pt VSS. ADL independence is limited by overall weakness, deficits in FMC link on left side and decreased activity tolerance.    REC: D/C TCU vs ARU to promote ADL IND through strengthening and increased activity tolerance

## 2017-06-09 NOTE — PLAN OF CARE
Problem: Goal Outcome Summary  Goal: Goal Outcome Summary  OT/ Edema:  Pt with pitting edema 2+ on dorsal surface of the B feet and B malleoli.  Therapist removed wraps, measured and performed skin care on B LE, skin appears intact and same condition as previous.  Pt displaying circumfrential reductions in 6/10 B LE anatomical landmarks.  B LE wrapped with GCB.  Wraps to be worn for 48 hrs unless pt experiences pain, numbness/ tingling or if the wraps are soiled.

## 2017-06-09 NOTE — PROGRESS NOTES
S: Patient was seen today at 6C  for an eval for a cervical collar as ordered by .     O/G: The objective/goal of the collar is to help reduce motion/give cervical stability.    A: Pt was fit with a [place] J cervical collar, size 250.  Starting with the anterior section, the correct size and height was chosen that supported the patient in the desired position.  Attention was given to the chin support being sure that the correct height was selected to support the chin.   The posterior section was centered on the patients head .  With the side closure straps extending equally on both sides, the Velcro was secured.  An extra padding set was also provided.  Patient instructed on donning, doffing, use and care    P: Patient has been instructed to contact our facility with any questions and/or concerns.

## 2017-06-09 NOTE — PROGRESS NOTES
"  Interventional Cardiology Progress Note  Subjective:  Ms. Michel reports doing well this am. She does c/o some GI upset since starting Metoprolol. She denies any CP, SOB, or pre-syncope. She did have palpitations associated w/ rapid ventricular rate yesterday.     Objective:  Most recent vital signs:  /77  Pulse 103  Temp 98  F (36.7  C) (Oral)  Resp 18  Ht 1.473 m (4' 10\")  Wt 83 kg (183 lb)  SpO2 100%  BMI 38.25 kg/m2  Temp:  [97.5  F (36.4  C)-99.2  F (37.3  C)] 98  F (36.7  C)  Pulse:  [] 103  Heart Rate:  [] 105  Resp:  [18] 18  BP: (102-138)/(25-98) 105/77  FiO2 (%):  [2 %] 2 %  SpO2:  [90 %-100 %] 100 %  Wt Readings from Last 2 Encounters:   06/09/17 83 kg (183 lb)   05/23/17 64.4 kg (141 lb 15.6 oz)       Intake/Output Summary (Last 24 hours) at 06/09/17 1157  Last data filed at 06/09/17 1000   Gross per 24 hour   Intake           1597.5 ml   Output             1775 ml   Net           -177.5 ml     Physical exam:  General: Remains somewhat ill appearing, NAD.  HEENT: EOMI, PERRLA, no scleral icterus or injection.  CARDIAC: RRR, no m/r/g appreciated. Unable to assess peripheral pulses 2/2 foot drsg's intact b/l.   RESP: Lungs fairly clear, very diminished to the RLL, some scattered fine crackles to bases b/l.  GI: Abd distended, edematous, non-tender. Bowel sounds hypo x 4.  EXTREMITIES: MARSHA LE edema 2/2 drsgs in place, toes w/ + circulation/pink/warm. Femoral access site w/o bleeding or mass, no bruits noted.  SKIN: Left arm drsg in place as well as drsg/wraps to bilateral LE.   NEURO: Alert and oriented X3, CN II-XII grossly intact, no focal neurological deficits noted.    Drains and Tubes: No drains or tubes present  Access: No central lines or devices in place    Labs (Past three days):  CBC  Recent Labs  Lab 06/09/17  0449 06/08/17  1649 06/08/17  0320 06/07/17  1640   WBC 2.8* 2.9* 2.5* 2.6*   RBC 2.03* 2.47* 2.15* 2.19*   HGB 6.9* 8.4* 7.3* 7.3*   HCT 20.7* 25.5* 22.3* 23.0* "   * 103* 104* 105*   MCH 34.0* 34.0* 34.0* 33.3*   MCHC 33.3 32.9 32.7 31.7   RDW 19.2* 19.1* 19.1* 18.9*   PLT 28* 30* 27* 33*     BMP  Recent Labs  Lab 06/09/17  0449 06/08/17  1649 06/08/17  0320 06/07/17  1640 06/07/17  0422 06/06/17  1654 06/06/17  1156 06/06/17  0409 06/05/17  1941    142 145* 145* 148* 146*  --  146*  --    POTASSIUM 4.1 4.0 4.1 3.9 3.6 4.1 3.6 3.5 3.8   CHLORIDE 107 107 110* 107 108 106  --  107  --    CO2 33* 30 33* 34* 36* 36*  --  36*  --    ANIONGAP 2* 5 3 4 5 4  --  4  --    * 146* 131* 121* 135* 144*  --  138*  --    BUN 31* 28 30 34* 37* 40*  --  39*  --    CR 0.74 0.73 0.69 0.72 0.75 0.78  --  0.84  --    GFRESTIMATED 81 83 87 83 80 77  --  70  --    GFRESTBLACK >90African American GFR Calc >90African American GFR Calc >90African American GFR Calc >90African American GFR Calc >90African American GFR Calc >90African American GFR Calc  --  85  --    VIKTORIYA 7.5* 7.8* 7.6* 7.5* 7.4* 7.4*  --  7.5*  --    MAG  --   --   --   --  1.9  --  2.0 2.4* 2.0   PHOS  --   --   --   --  3.3 1.7* 2.1* 2.6 2.0*     INR  Recent Labs  Lab 06/09/17  0449 06/08/17  0320 06/07/17  0422 06/06/17  0409   INR 1.22* 1.23* 1.27* 1.27*     Liver panel  Recent Labs  Lab 06/05/17  0354   PROTTOTAL 4.2*   ALBUMIN 1.9*   BILITOTAL 1.7*   ALKPHOS 149   AST 43   *       Imaging/procedure results:  06/07 US arterial Duplex:   1. Resolution of occluded left radial artery.  2. Focal stenosis of the right radial artery at the wrist with peak systolic velocity 486 cm/s.  3. 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      Assessment & Plan:  # Hypoxic respiratory failure  - s/p 7-day course of meropenem/ertapenem (05/30-06/04)  - Continue additional furosemide 60 mg IV BID today and follow electrolytes      # Out-of-hospital cardiac arrest and cardiogenic shock; Hx of AFib/Aflutter/SVT; sinus arrest  -  Increase digoxin to 0.25 mg q24h given multiple episodes of AFib/AFlutter  - Tolerating BB but GI upset w/ Metop - Will change to Propanolol  - ICD before discharge once stable from hematologic standpoint      # Severe critical illness myopathy  - OT/PT  - Neuro consult requested per PMR and rehabilitation planning, appreciate recs      # Severe pancytopenia  - Platelet goal >20K, stable 28-30 since yesterday - will follow         - A/w haptoglobin   - Hematology following, appreciate recs    # Anemia  - 2 units of PRBC today for H/H 6.9/20.7. Recheck H/H at 1400.   - Consult Rheumatology in setting of RA history (holding RA medications 2/2 severe pancytopenia and anemia)     # Ischemic liver injury complicated by coagulopathy  - Feeding via NGT      Chronic/inactive issues:  # ESBL UTI, aspiration pneumonia: s/p meropenem/ertapenem course  # CELSA: Baseline Cr 0.7-0.8; peak Cr 1.9  # RA: Weaning of stress dosed steroids completed 06/04; hold immunosuppressants   # Seizure: levetiracetam discontinued 06/05       ELIZABETH Johnson, CNP  [facility]  Interventional Cardiology    Patient seen and discussed w/ Dr. Wood.

## 2017-06-09 NOTE — PLAN OF CARE
Problem: Goal Outcome Summary  Goal: Goal Outcome Summary  Outcome: No Change  No significant events over night. Pt converted back to SR 70-80's at 2100. Pt's VSS. Pt c/o generalized pain. Tylenol given and effective in relieving pain. Pt turned every 2hrs and as needed. TF continues at 40ml/hr. No c/o nausea/vomiting this shift. Continue to encourage increase in activity level. Notify MD of any changes/concerns.

## 2017-06-09 NOTE — PLAN OF CARE
Problem: Goal Outcome Summary  Goal: Goal Outcome Summary  Therapy canceled- attempted to see pt on 3 different attempts throughout- pt was with other therapists or providers each time.Will reattempt tomorrow.

## 2017-06-09 NOTE — CONSULTS
"[facility] Rheumatology Consultation    Amelia Michel MRN# [id number]   Age: 56 year old YOB: 1960     Date of Admission:  5/29/2017    Reason for consult: Rheumatoid arthritis       Requesting physician: Sundar Wood MD       Level of consult: Consult, follow and place orders           Assessment and Recommendation:   Assessment:  This patient is a 56 year old white female with a significant past medical history of RA (on MTX, HCQ, pred), VSD repair 1969, and recent dx Afib/Aflutter/SVT who was admitted on 5/29/17 after OOH cardiac arrest, now with pancytopenia after stress-dose steroids and developing arthralgias in setting of holding immunosuppressants. In regards to pancytopenia, there is not a clear etiology. The patient was not on high enough doses of methotrexate previously to cause this degree of pancytopenia. Along that line, RA is not associated with such a high degree of pancytopenia. Other AI diseases, like Lupus, would be more likely to do this. The patient denies ever being given a dx of Lupus, and we have no previous serologies available, but she does endorse being told she has a \"form\" of RA, which raises the question of whether she may have Rupus (RA/Lupus overlap). It is possible, as heme as eluded to, that she may have some chronic mild ITP in setting of her RA, but this clearly does not explain the full picture. We do feel it is likely that her marrow is suppressed in setting of extreme stress given the current events. In regards to her arthralgias, we would like to start prednisone for some relief. Agree with continuing to hold MTX in setting of pancytopenia. No need to give Plaquenil now, can be restarted at a later date. Lastly, the etiology of her constellation of symptoms/findings remains unclear. We will order labs as below to identify whether there is a second AI process going on (Lupus vs APLA syndrome).      Recommendations:  1. Agree with holding " methotrexate, can continue to hold Plaquenil as well  2. Start prednisone 15mg qday  3. Labs ordered for you: CELESTE, dsDNA, RF, CCP, C3/C4, KALYAN panel, antiphospholipid labs  4. Consider imaging for spleen if truly concerned for ITP     Patient was seen and discussed with attending physician, Dr. Mcfarland.     Sana Connell MD  Internal Medicine PGY-1  P: [phone]    I saw the patient with the resident.  My exam and recomendations are as described.      Garfield Mcfarland -[id number]         Chief Complaint:   Arthralgias  Pancytopenia     History is obtained from the patient and electronic health record         History of Present Illness:   This patient is a 56 year old white female with a significant past medical history of RA (on MTX, HCQ), VSD repair 1969, and recent Afib/Aflutter/SVT who was admitted on 5/29/17 after OOH PEA arrest, now with pancytopenia after stress-dose steroids and developing arthralgias in setting of holding immunosuppressants.     Rheumatology has been consulted regarding whether new pancytopenia and emergence of RA symptoms may be linked, to evaluate for cause of pancytopenia, and to provide recommendations on RA therapies at this time.     Briefly, Bree was recently admitted 5/15-5/23/2017 for Afib/Aflutter. She failed DCCV after 24 hours and this was c/b hypotension requiring vasoactive agents. She became hypotensive with IV diltiazem, did not tolerate sotalol, had an episode of SVT w/ rates to the 170's, and was subsequently started on amiodarone, which she tolerated well. A cardiac MRI was w/o etiology for arrhythmia, but did show severe TR and severe bi-atrial enlargement. She was discharged on amiodarone, apixaban, and instructed to follow-up in EP clinic for discussion re: ablation. She was also treated for ESBL UTI that admission. She was noted to be pancytopenic as well. Following this admission there was concern for pulmonary hypertension. She followed with Dr. Mcnulty in  "clinic and this was ruled out.     On 5/29/17 the patient's  woke next to her to find her unresponsive and gasping for air. He dragged her off the bed and started CPR. EMS arrived and provided one shock for a shockable rhythm, following which she converted to PEA and received 1-2 rounds of epi during CPR. ROSC was obtained, she was intubated, started on epi gtt and admitted for post-arrest management. Cath showed normal coronaries. Temporary pacemaker was placed. On admission her labs were notable for elevated Cr, lactic acidosis, LDH 1655, CRP 58, AST/ALT c/w shock liver, and hypoalbuminemia. She required pressors and pacing initially. She received stress-dose steroids x5 days, and was extubated. Since admission, her platelets have trended down and have now been stable in the 20-30's. Her WBC was normal on admission, but is now stable in 2.5-2.9. Hgb has been 7-8.5, but today was 6.9 so was given 2 units pRBCs. Heme has been involved for pancytopenia. There was concern for HIT given heparin exposure at last admission and likely during cath, but HIT was negative. Haptoglobin was normal, and TODD was negative, ruling out AIHA. Smear showed anemia, echinocytes and fragments, lymphocytopenia, thrombocytopenia, and shperocytosis. Heme is concerned for possible mild chronic ITP in setting of underlying AI disease and chronic marrow suppression in setting of immunosuppressant use, now with large component of marrow suppression 2/2 abx and intense stress on system (cardiogenic shock). They have not performed a bone marrow bx at this point. LDH continues to be elevated, but has trended down each day.     Her rheumatologic history begins in 1989 when she was diagnosed with a \"form\" of rheumatoid arthritis. She has followed with Dr. Dumas since that time. She has only ever been on Plaquenil, prednisone, and methotrexate for her RA. She is unable to describe the \"form\" of RA that she has any further. She denies ever " hearing that she has Lupus-like features or features of other autoimmune diseases. Her RA primarily affected her feet, requiring her to walk on her tip toes for a few hours in the mornings. She has had several osteotomies of her MTPs and a partial amputation of her distal left great toe after failed fusion. She has also had a fusion procedure in her right wrist. She denies any lower extremity rashes, malar/butterfly facial rashes, oral lesions, dry eyes/mouth, heart/lung involvement, kidney problems, myalgias. She has never been pregnant. Was given a diagnosis of pulmonary hypertension many years ago, but followed up with Dr. Mcnulty in 2015 who refuted this diagnosis.     Today she reports feeling swollen all over, describes onset of lymphedema over past several months. Has arthralgias in wrists, fingers, and feet. Denies fever/chills, abdominal pain. She has chest pain 2/2 Yair use during cardiac arrest. Has bruising and skin breakdown on left arm due to IV infiltration, but denies any other rash, bumps, or spots on her skin. She feels very tired from all that she has been through. She reports only ever having been on MTX, HCQ, and prednisone, but chart review shows that she was apparently on leflunomide in 2015.            Past Medical History:     Past Medical History:   Diagnosis Date     Hypertension      Rheumatoid arthritis(714.0)              Past Surgical History:     Past Surgical History:   Procedure Laterality Date     ANESTHESIA CARDIOVERSION N/A 5/17/2017    Procedure: ANESTHESIA CARDIOVERSION;  ANESTHESIA CARDIOVERSION;  Surgeon: GENERIC ANESTHESIA PROVIDER;  Location: [facility] OR     ARTHRODESIS WRIST  2000    Right wrist     FOOT SURGERY      4 left and 2 right     RELEASE CARPAL TUNNEL BILATERAL       REPAIR VENTRICULAR SEPTAL DEFECT  1969             Social History:     Social History     Social History     Marital status:      Spouse name: Romeo Michel     Number of children: 0     Years of  education: N/A     Occupational History      [employer]     Social History Main Topics     Smoking status: Never Smoker     Smokeless tobacco: Never Used     Alcohol use Yes      Comment: Glass of wine two evenings a night     Drug use: No     Sexual activity: Not on file     Other Topics Concern     Parent/Sibling W/ Cabg, Mi Or Angioplasty Before 65f 55m? No     Social History Narrative             Family History:     Family History   Problem Relation Age of Onset     Breast Cancer Mother      Hypertension Mother      Alzheimer Disease Mother      Hypertension Father      Blood Disease Father      Lymphoa     Circulatory Father      A Fib     DIABETES Paternal Grandmother      Family history reviewed          Immunizations:     Immunization History   Administered Date(s) Administered     Pneumococcal 23 valent 08/31/2011     TDAP Vaccine (Adacel) 02/08/2011             Allergies:     Allergies   Allergen Reactions     Penicillins      Tape [Adhesive Tape] Rash             Medications:     Current Facility-Administered Medications   Medication     ipratropium (ATROVENT) 0.02 % neb solution 0.5 mg     furosemide (LASIX) injection 60 mg     propranolol (INDERAL) tablet 10 mg     digoxin (LANOXIN) tablet 250 mcg     gabapentin (NEURONTIN) capsule 200 mg     artificial tears ophthalmic ointment     glucose 40 % gel 15-30 g    Or     dextrose 50 % injection 25-50 mL    Or     glucagon injection 1 mg     insulin aspart (NovoLOG) inj (RAPID ACTING)     potassium chloride SA (K-DUR/KLOR-CON M) CR tablet 20-40 mEq     potassium chloride (KLOR-CON) Packet 20-40 mEq     potassium chloride 10 mEq in 100 mL intermittent infusion     potassium chloride 10 mEq in 100 mL intermittent infusion with 10 mg lidocaine     potassium chloride 20 mEq in 50 mL intermittent infusion     magnesium sulfate 2 g in NS intermittent infusion (PharMEDium or FV Cmpd)     magnesium sulfate 4 g in 100 mL sterile water  (premade)     oxyCODONE (ROXICODONE) IR tablet 5 mg     melatonin tablet 3 mg     HYDROmorphone (DILAUDID) injection 0.2 mg     pantoprazole (PROTONIX) suspension 40 mg     dextrose 10 % 1,000 mL infusion     multivitamins with minerals (CERTAVITE/CEROVITE) liquid 15 mL     sodium chloride (PF) 0.9% PF flush 3 mL     medication instruction     sodium phosphate 25 mmol in D5W intermittent infusion     folic acid (FOLVITE) tablet 1 mg     calcium carb 1250 mg (500 mg [place])/vitamin D 200 units (OSCAL with D) per tablet 2 tablet     lidocaine 1 % 1 mL     senna-docusate (SENOKOT-S;PERICOLACE) 8.6-50 MG per tablet 1-2 tablet     acetaminophen (TYLENOL) Suppository 650 mg     acetaminophen (TYLENOL) tablet 650 mg     alum & mag hydroxide-simethicone (MYLANTA ES/MAALOX  ES) suspension 15-30 mL     lidocaine (LMX4) kit     sodium chloride (PF) 0.9% PF flush 3 mL     naloxone (NARCAN) injection 0.1-0.4 mg     sodium phosphate 10 mmol in D5W intermittent infusion     sodium phosphate 15 mmol in D5W intermittent infusion     sodium phosphate 20 mmol in D5W intermittent infusion     dextrose 10 % 1,000 mL infusion     calcium chloride 1 g in NaCl 0.9 % 100 mL intermittent infusion             Review of Systems:   A comprehensive review of systems was performed and found to be negative except as described in this note          Physical Exam:   Vitals were reviewed  Temp: 98.2  F (36.8  C) Temp src: Oral BP: 130/80 Pulse: 103 Heart Rate: 75 Resp: 17 SpO2: 99 % O2 Device: Nasal cannula Oxygen Delivery: 2 LPM    Gen:  Pleasant woman, in no acute distress, lying in bed  HEENT: NC/AT, PERRL, no scleral icterus, sclera noninjected, external ears normal, oropharynx clear without edema/exudate, no oral lesions, moist mucous membranes  Neck: Supple, no LAD  CV:  Normal rate, irregular rhythm, S1/S2, 2/6 systolic murmur heard best at LUSB  Resp: CTAB, no wheezes/rhonchi/crackles  Abd:  Normoactive bowel sounds, soft, nondistended,  nontender to deep palpation, no splenomegaly  Skin:  Area of excoriation over sternum (where Yair device was placed). Large area of granulation tissue where skin has sloughed off on medial left arm (due to IV infiltration). No noted petechiae or other areas of bleeding.   Ext: diffuse anasarca, lower extremities with lymphedema wraps in place. Bilateral hands with decreased ability to form a fist, worse on left. Tender synovitis of MCPs bilaterally.   Neuro: AOx3, CN's II-XII grossly intact          Data:   Labs:  142  107    31  /  ---------------------- 112  4.1    33   0.74 \            \  6.9  /   2.8 ----------- 28          / 20.7 \  MCV: 102  RDW 19.2  %Retic 1.3  Retic index 0.32  ANC 3.0  Abs lymph ct 0.6      HIT negative  Haptoglobin 59 (wnl)  TODD negative  Ferritin 1952    SPEP negative for monoclonal spike  Neuron specific enolase 23.1    Smear: normochromic, macrocytic anemia, echinocytes and rare fragments, lymphocytopenia, thrombocytopenia, polychromasia, spherocytosis    4/2016 Labs:   RF 33  CELESTE 1:40 homogeneous pattern  Cryoglobulin - trace    Imaging:  All imaging reviewed in Epic.   Abd US does not comment on spleen size.     TTE 6/5/2017:   Interpretation Summary  Limited, technically difficult study. Extremely difficult acoustic windows despite the use of contrast for endocardial border definition.  Borderline (EF 50-55%) reduced left ventricular function is present.  Additionally, the abnormal non-specific septal motion and irregular cardiaccycle contribute to the difficulty in accurately measuring LVEF.  The IVC is dilated at 2.5 cm, c/w increased RAP. The RAP is in excess of 15mmHg.

## 2017-06-09 NOTE — PROGRESS NOTES
Calorie Counts    Intake Recorded For: 6/8 Kcals: 70 Protein: 1    # Meals Recorded: 1 meal (100% pureed peaches)    # Supplements Recorded: 0

## 2017-06-09 NOTE — PROGRESS NOTES
Social Work Services Progress Note    Hospital Day: 12  Date of Initial Social Work Evaluation:  6/31/17  Collaborated with:  Patient, CSI, Patients     Data:    Patient is a 56 year old female who SW is following for TCU placement,  Admissions coordinatorAgustin    Intervention:    CSI completed FMLA and short term disability paperwork for patient and pt's . SW faxed completed physician statements to the directed fax numbers ([employer]-[phone], [facility] [employer]-[phone] and [employer]-[phone]). Originial copies were given back to patient's  and SW directed patients  to send blank Employer statement to patients employer. Patients  is aware that the physician statement documents were faxed.     SW spoke with PM&R regarding recommendations. At this time they do recommend TCU but feel patient may be more appropriate for ARU closer to actual DC date. PM&R asked to be re consulted at [place]. SW spoke with [facility] admissions coordinator who reported that patient continues to be followed for placement at rehab. Patient is not medically appropriate to DC at this time.    Assessment:  Patients  thanked SW for faxing the information to appropriate sources and reported that he will continue to work to complete what is needed for FMLA and for short-term disability. PT and OT were present in the room so SW was not able to assess patient at this time.     Plan:    Anticipated Disposition:  Facility:  [facility] vs. TCU    Barriers to d/c plan:  PM&R consult, medical stability, bed availability     Follow Up:  SW will continue to follow to assist with DC planning.     SARAH James  [facility] , Coverage 6C  PH: [phone]  Pgr: [phone]

## 2017-06-09 NOTE — CONSULTS
[facility]  Neurology Consult  6/9/2017      Amelia Michel MRN# [id number]   YOB: 1960 Age: 56 year old      Date of Admission:  5/29/2017    Primary care provider: Comfort Sabillon    Requesting physician: Dr. Wood    Reason for Consult: Weakness         Assessment and Recommendations:   Assessment: Amelia Michel is a 56 year old female with history of RA and S1 radiculopathy who presented to [facility] with out of hospital cardiac arrest.  She has been improving and is doing very well from cardiac standpoint one week out from arrest.  Neurology was consulted for concern for weakness.  Her exam is significant for LUE weakness most prominent in C5,C6 regions compared to L.  Reflexes are difficult as arms are edematous but appear decreased on that side.  Previous C-spine MRI reviewed and patient has degenerative changes in her C-spine that are significant although non compressive at that time.  Given her abnormal spine and weakness, I think it is reasonable that in the activity of cardiac resuscitation she may have had a hyperflexion or hyperextension injury that injured her cord (given upgoing toe), or root.  I would put patient in a C-collar and image the C-spine with MRI.  Even though MRI brain is likely lower yield in the setting of cardiac arrest I would do this as well to rule out any stroke or anoxic injury although in the absence of facial weakness this seems less likely.   I do not think this patient has critical illness myopathy as the time course is too short and the distribution of weakness is too asymmetric.        Recommendations:  - MRI C-spine  - C-Collar until MRI C-spine read and cleared  - MRI brain  - Neurology will follow up on results     Thank you for involving neurology in the care of this patient. Do not hesitate to call the neurology consults pager at [phone] with any questions.    Patient discussed with Dr. Roman, attending.    Gisela  Jackelyn  PGY-3 Neurology              Chief Complaint:   Weakness       History is obtained from the patient and/or family and medical record      Amelia Michel is a 56 year old female with a history of RA and radiculopathy who presented after a out of the hospital cardiac arrest.  Patient had been admitted 3 weeks prior for atrial arrhythmias of multiple types.  She was discharged after a week in the hospital but since that time patient and  report that she has had some generalized weakness with flu like symptoms.  On 5/29 patient was found unresponsive without a pulse.   states he dragged her off the bed where she may have hit her head, called EMS and started CPR.  CPR and ACLS was continued for 15-30 minutes before ROSC.   She was taken to cath lab but did not have an occlusion.  Her course has been complicated by cardiogenic shock, hypoxic respiratory failure with pulmonary edema, ischemic liver injury, and pancytopenia.  Her Head CT next day showed no evidence of anoxic brain injury and she was cooled.   Despite this over the past week patient has markedly improved and is bascially cognitively intact.  Primary team is planning on sending to ARU, however rehab staff requested neurology consult to rule out other neurologic injury.      Patient states she noticed weakness immediately upon wakening from her sedation and coma.  She states she noticed it in her L arm mostly and it is associated with numbness as well.  She feels generally weak, but LUE weakness is definitely new.  She does not have neck pain.  Of note she was seen in 7/2017 by this provider and was found to have a S1 radiculopathy at that time, however neurologic exam at that time was significant for brisk R patella and subtle weakness in L FDI muscle.  She had MRI's or her spine and plexus that showed multiple degenerative changes of RA as well as a possible arachnoid cyst at T3-T4.              Past Medical History:     Past  Medical History:   Diagnosis Date     HTN (hypertension)      Primary pulmonary hypertension (H)      Rheumatoid arthritis(714.0)               Past Surgical History:     Past Surgical History:   Procedure Laterality Date     ANESTHESIA CARDIOVERSION N/A 5/17/2017    Procedure: ANESTHESIA CARDIOVERSION;  ANESTHESIA CARDIOVERSION;  Surgeon: GENERIC ANESTHESIA PROVIDER;  Location: [facility] OR     ARTHRODESIS WRIST  2000    Right wrist     FOOT SURGERY      4 left and 2 right     RELEASE CARPAL TUNNEL BILATERAL       REPAIR VENTRICULAR SEPTAL DEFECT  1969             Social History:     Social History     Social History     Marital status:      Spouse name: Romeo Michel     Number of children: 0     Years of education: N/A     Occupational History      [employer]     Social History Main Topics     Smoking status: Never Smoker     Smokeless tobacco: Never Used     Alcohol use Yes      Comment: Glass of wine two evenings a night     Drug use: No     Sexual activity: Not on file     Other Topics Concern     Parent/Sibling W/ Cabg, Mi Or Angioplasty Before 65f 55m? No     Social History Narrative             Family History:     Family History   Problem Relation Age of Onset     Breast Cancer Mother      Hypertension Mother      Alzheimer Disease Mother      Hypertension Father      Blood Disease Father      Lymphoa     Circulatory Father      A Fib     DIABETES Paternal Grandmother              Immunizations:     Immunization History   Administered Date(s) Administered     Pneumococcal 23 valent 08/31/2011     TDAP Vaccine (Adacel) 02/08/2011            Allergies:      Allergies   Allergen Reactions     Penicillins      Tape [Adhesive Tape] Rash             Medications:     Prescription Medications as of 6/9/2017             metoprolol (TOPROL-XL) 100 MG 24 hr tablet Take 1 tablet (100 mg) by mouth daily    amiodarone (PACERONE/CODARONE) 200 MG tablet Take 1 tablet (200 mg) by mouth daily     apixaban ANTICOAGULANT (ELIQUIS) 5 MG tablet Take 1 tablet (5 mg) by mouth 2 times daily    diltiazem 120 MG 24 hr capsule Take 1 capsule (120 mg) by mouth daily    Calcium Carb-Cholecalciferol 600-800 MG-UNIT TABS Take 2 tablets by mouth every morning    Multiple Vitamins-Minerals (WOMENS MULTIVITAMIN PO) Take 1 tablet by mouth daily At bedtime    Coenzyme Q10 (COQ-10 PO) Take 1 tablet by mouth daily In the morning    gabapentin (NEURONTIN) 100 MG capsule Take 2 capsules (200 mg) by mouth 3 times daily    pramipexole (MIRAPEX) 0.25 MG tablet Reported on 4/4/2017    traMADol (ULTRAM) 50 MG tablet Take 50 mg by mouth Every 6 hours as needed for pain    diphenoxylate-atropine (LOMOTIL) 2.5-0.025 MG per tablet Take 0.5 tablets by mouth 2 times daily     ASPIRIN PO Take 325 mg by mouth daily At bedtime    hydroxychloroquine (PLAQUENIL) 200 MG tablet Take 1 tablet by mouth twice daily on Monday, Wednesday, and Friday and 1 tablet once daily by mouth on Tuesday, Thursday, Saturday, Sunday.    leflunomide (ARAVA) 20 MG tablet Take 20 mg by mouth daily In the morning    folic acid (FOLVITE) 1 MG tablet Take 1 mg by mouth daily.    predniSONE (DELTASONE) 1 MG tablet Take 3 mg by mouth daily In the morning    METHOTREXate 2.5 MG tablet Take 10 mg by mouth once a week On Tuesdays      Facility Administered Medications as of 6/9/2017             ipratropium (ATROVENT) 0.02 % neb solution 0.5 mg Take 2.5 mLs (0.5 mg) by nebulization 4 times daily as needed for wheezing    furosemide (LASIX) injection 60 mg Inject 6 mLs (60 mg) into the vein 2 times daily    furosemide (LASIX) injection 60 mg Inject 6 mLs (60 mg) into the vein once    digoxin (LANOXIN) tablet 250 mcg Take 2 tablets (250 mcg) by mouth daily    digoxin (LANOXIN) tablet 125 mcg Take 1 tablet (125 mcg) by mouth once    metoprolol (LOPRESSOR) injection 5 mg Inject 5 mLs (5 mg) into the vein once    metoprolol (LOPRESSOR) tablet 25 mg Take 1 tablet (25 mg) by mouth 2  "times daily    metoprolol (LOPRESSOR) tablet 25 mg Take 1 tablet (25 mg) by mouth once    gabapentin (NEURONTIN) capsule 200 mg Take 2 capsules (200 mg) by mouth 3 times daily    digoxin (LANOXIN) tablet 125 mcg (Discontinued) Take 1 tablet (125 mcg) by mouth daily    metoprolol (LOPRESSOR) tablet 25 mg (Discontinued) Take 1 tablet (25 mg) by mouth 2 times daily    artificial tears ophthalmic ointment Place into both eyes every 8 hours as needed for dry eyes    glucose 40 % gel 15-30 g Take 15-30 g by mouth every 15 minutes as needed for low blood sugar    Linked Group 1:  \"Or\" Linked Group Details     dextrose 50 % injection 25-50 mL Inject 25-50 mLs into the vein every 15 minutes as needed for low blood sugar    Linked Group 1:  \"Or\" Linked Group Details     glucagon injection 1 mg Inject 1 mg Subcutaneous every 15 minutes as needed for low blood sugar (May repeat x 1 only)    Linked Group 1:  \"Or\" Linked Group Details     insulin aspart (NovoLOG) inj (RAPID ACTING) Inject 1-7 Units Subcutaneous every 4 hours    potassium chloride SA (K-DUR/KLOR-CON M) CR tablet 20-40 mEq Take 2-4 tablets (20-40 mEq) by mouth every 2 hours as needed for potassium supplementation    potassium chloride (KLOR-CON) Packet 20-40 mEq 20-40 mEq by Oral or Feeding Tube route every 2 hours as needed for potassium supplementation    potassium chloride 10 mEq in 100 mL intermittent infusion Inject 100 mLs (10 mEq) into the vein every hour as needed for potassium supplementation    potassium chloride 10 mEq in 100 mL intermittent infusion with 10 mg lidocaine Inject 100 mLs (10 mEq) into the vein every hour as needed for potassium supplementation    potassium chloride 20 mEq in 50 mL intermittent infusion Inject 50 mLs (20 mEq) into the vein every hour as needed for potassium supplementation    magnesium sulfate 2 g in NS intermittent infusion (PharMEDium or [facility] Cmpd) Inject 50 mLs (2 g) into the vein daily as needed for magnesium " supplementation    magnesium sulfate 4 g in 100 mL sterile water (premade) Inject 100 mLs (4 g) into the vein every 4 hours as needed for magnesium supplementation    oxyCODONE (ROXICODONE) IR tablet 5 mg Take 1 tablet (5 mg) by mouth every 4 hours as needed for moderate to severe pain    melatonin tablet 3 mg Take 1 tablet (3 mg) by mouth nightly as needed for sleep    HYDROmorphone (DILAUDID) injection 0.2 mg Inject 0.2 mLs (0.2 mg) into the vein every 3 hours as needed for moderate to severe pain    ipratropium (ATROVENT) 0.02 % neb solution 0.5 mg (Discontinued) Take 2.5 mLs (0.5 mg) by nebulization four times daily    pantoprazole (PROTONIX) suspension 40 mg Take 20 mLs (40 mg) by mouth daily    dextrose 10 % 1,000 mL infusion Inject into the vein continuous prn (Hypoglycemia prevention)    multivitamins with minerals (CERTAVITE/CEROVITE) liquid 15 mL 15 mLs by Per Feeding Tube route daily    sodium chloride (PF) 0.9% PF flush 3 mL 3 mLs by Intracatheter route every 8 hours    medication instruction continuous prn    sodium phosphate 25 mmol in D5W intermittent infusion Inject 25 mmol into the vein every 8 hours as needed for phosphorous supplementation    folic acid (FOLVITE) tablet 1 mg Take 1 tablet (1 mg) by mouth daily    calcium carb 1250 mg (500 mg [place])/vitamin D 200 units (OSCAL with D) per tablet 2 tablet Take 2 tablets by mouth every morning    lidocaine 1 % 1 mL 1 mL by Other route every hour as needed (mild pain with VAD insertion or accessing implanted port)    senna-docusate (SENOKOT-S;PERICOLACE) 8.6-50 MG per tablet 1-2 tablet Take 1-2 tablets by mouth 2 times daily    acetaminophen (TYLENOL) Suppository 650 mg Place 1 suppository (650 mg) rectally every 4 hours as needed for mild pain    acetaminophen (TYLENOL) tablet 650 mg Take 2 tablets (650 mg) by mouth every 4 hours as needed for mild pain    alum & mag hydroxide-simethicone (MYLANTA ES/MAALOX  ES) suspension 15-30 mL Take 15-30 mLs by  "mouth every 4 hours as needed for indigestion    lidocaine (LMX4) kit Apply topically every hour as needed for pain (with VAD insertion or accessing implanted port.)    sodium chloride (PF) 0.9% PF flush 3 mL 3 mLs by Intracatheter route every hour as needed for line flush    naloxone (NARCAN) injection 0.1-0.4 mg Inject 0.25-1 mLs (0.1-0.4 mg) into the vein every 2 minutes as needed for opioid reversal    sodium phosphate 10 mmol in D5W intermittent infusion Inject 10 mmol into the vein daily as needed for phosphorous supplementation    sodium phosphate 15 mmol in D5W intermittent infusion Inject 15 mmol into the vein daily as needed for phosphorous supplementation    sodium phosphate 20 mmol in D5W intermittent infusion Inject 20 mmol into the vein every 6 hours as needed for phosphorous supplementation    dextrose 10 % 1,000 mL infusion Inject into the vein continuous prn (Give if on IV Insulin Infusion, and Parenteral or Enteral nutrition held or cycled off. )    calcium chloride 1 g in NaCl 0.9 % 100 mL intermittent infusion Inject 1 g into the vein every hour as needed (Hypocalcemia)    gabapentin (NEURONTIN) solution 200 mg (Discontinued) 4 mLs (200 mg) by Oral or Feeding Tube route 3 times daily                Review of Systems:   The Review of Systems is negative other than noted in the HPI         Physical Exam:   /77  Pulse 103  Temp 98  F (36.7  C) (Oral)  Resp 18  Ht 1.473 m (4' 10\")  Wt 83 kg (183 lb)  SpO2 100%  BMI 38.25 kg/m2   Physical Exam:   General: NAD  HEENT: sclera anicteric  Resp: Breathing comfortably, no respiratory distress  Skin: warm and dry  Extremities: L arm and bilateral lower extremity edema.    Neurologic:   Mental Status Exam: Alert, awake and oriented to person, place and time. No dysarthria. Speech of normal fluency. 3/5 on 5 minute recall, naming and repeating normal, simple calculation intact, spelling intact, able to follow 3 step crossed commands.    Cranial " Nerves: PERRL, EOMs intact, no nystagmus, facial movements symmetric, facial sensation intact to light touch, hearing intact to conversation, palate and uvula rise symmetrically, no deviation in uvula or tongue, trapezius and SCMs 5/5 bilaterally, tongue midline and fully mobile. No atrophy or fasciculations.    Motor: Normal tone in all four extremities, no atrophy or fasciculations. Strength diffusely decreased at around 5-/5 in all extremities except weaker in following  RUE: SA 4/5, EF 4/5, EE 4-/5, WE 4/5, FDI: 3+/5, ADM: 3+/5, APB 3+/5.   LUEL FDI and ADM 4/5.   Lower extremities hip flexion bilaterally 4/5, L dorsiflexion 4-/5.    Sensory: Decreased sensation to pin prick in C4-T1 levels on LUE.  No sensory level, Thoracic levels appear intact.     Coordination: Finger-nose-finger intact bilaterally.    Reflexes: Brisk and symmetric RUE DTRs, LUE decreased but possibly due to edema bandages.  Patella 3+ on R, 2+ on L, ankles absent bilaterally.  R toe upgoing, L toe ? upgoing as partial amputation.  No clonus.     Gait: deferred as patient requiring assist of 2.            Data:   CBC:  Lab Results   Component Value Date    WBC 2.8 06/09/2017     Lab Results   Component Value Date    HGB 6.9 06/09/2017     Lab Results   Component Value Date    HCT 20.7 06/09/2017     Lab Results   Component Value Date    PLT 28 06/09/2017       Last Basic Metabolic Panel:  Lab Results   Component Value Date     06/09/2017      Lab Results   Component Value Date    POTASSIUM 4.1 06/09/2017     Lab Results   Component Value Date    CHLORIDE 107 06/09/2017     Lab Results   Component Value Date    VIKTORIYA 7.5 06/09/2017     Lab Results   Component Value Date    CO2 33 06/09/2017     Lab Results   Component Value Date    BUN 31 06/09/2017     Lab Results   Component Value Date    CR 0.74 06/09/2017     Lab Results   Component Value Date     06/09/2017       Head CT: No acute pathology    MRI pan spine 2016 reviewed with  degenerative changes in cervical spine, arachnoid cyst in throacic spine compressing cord without signal change, and bilateral S1 foraminal stensosis.

## 2017-06-10 ENCOUNTER — APPOINTMENT (OUTPATIENT)
Dept: OCCUPATIONAL THERAPY | Facility: CLINIC | Age: 57
DRG: 260 | End: 2017-06-10
Attending: PSYCHIATRY & NEUROLOGY
Payer: COMMERCIAL

## 2017-06-10 LAB
ANION GAP SERPL CALCULATED.3IONS-SCNC: 5 MMOL/L (ref 3–14)
ANION GAP SERPL CALCULATED.3IONS-SCNC: 9 MMOL/L (ref 3–14)
BUN SERPL-MCNC: 27 MG/DL (ref 7–30)
BUN SERPL-MCNC: 30 MG/DL (ref 7–30)
CALCIUM SERPL-MCNC: 7.1 MG/DL (ref 8.5–10.1)
CALCIUM SERPL-MCNC: 7.5 MG/DL (ref 8.5–10.1)
CHLORIDE SERPL-SCNC: 99 MMOL/L (ref 94–109)
CHLORIDE SERPL-SCNC: 99 MMOL/L (ref 94–109)
CO2 SERPL-SCNC: 30 MMOL/L (ref 20–32)
CO2 SERPL-SCNC: 34 MMOL/L (ref 20–32)
CREAT SERPL-MCNC: 0.7 MG/DL (ref 0.52–1.04)
CREAT SERPL-MCNC: 0.82 MG/DL (ref 0.52–1.04)
ERYTHROCYTE [DISTWIDTH] IN BLOOD BY AUTOMATED COUNT: 19.7 % (ref 10–15)
ERYTHROCYTE [DISTWIDTH] IN BLOOD BY AUTOMATED COUNT: 19.9 % (ref 10–15)
GFR SERPL CREATININE-BSD FRML MDRD: 71 ML/MIN/1.7M2
GFR SERPL CREATININE-BSD FRML MDRD: 86 ML/MIN/1.7M2
GLUCOSE BLDC GLUCOMTR-MCNC: 131 MG/DL (ref 70–99)
GLUCOSE BLDC GLUCOMTR-MCNC: 142 MG/DL (ref 70–99)
GLUCOSE BLDC GLUCOMTR-MCNC: 163 MG/DL (ref 70–99)
GLUCOSE BLDC GLUCOMTR-MCNC: 181 MG/DL (ref 70–99)
GLUCOSE BLDC GLUCOMTR-MCNC: 207 MG/DL (ref 70–99)
GLUCOSE BLDC GLUCOMTR-MCNC: 233 MG/DL (ref 70–99)
GLUCOSE SERPL-MCNC: 170 MG/DL (ref 70–99)
GLUCOSE SERPL-MCNC: 195 MG/DL (ref 70–99)
HCT VFR BLD AUTO: 31 % (ref 35–47)
HCT VFR BLD AUTO: 32.6 % (ref 35–47)
HGB BLD-MCNC: 10.1 G/DL (ref 11.7–15.7)
HGB BLD-MCNC: 11 G/DL (ref 11.7–15.7)
MCH RBC QN AUTO: 31.8 PG (ref 26.5–33)
MCH RBC QN AUTO: 32.1 PG (ref 26.5–33)
MCHC RBC AUTO-ENTMCNC: 32.6 G/DL (ref 31.5–36.5)
MCHC RBC AUTO-ENTMCNC: 33.7 G/DL (ref 31.5–36.5)
MCV RBC AUTO: 95 FL (ref 78–100)
MCV RBC AUTO: 98 FL (ref 78–100)
PLATELET # BLD AUTO: 27 10E9/L (ref 150–450)
PLATELET # BLD AUTO: 31 10E9/L (ref 150–450)
POTASSIUM SERPL-SCNC: 3.7 MMOL/L (ref 3.4–5.3)
POTASSIUM SERPL-SCNC: 4.3 MMOL/L (ref 3.4–5.3)
RBC # BLD AUTO: 3.18 10E12/L (ref 3.8–5.2)
RBC # BLD AUTO: 3.43 10E12/L (ref 3.8–5.2)
SODIUM SERPL-SCNC: 137 MMOL/L (ref 133–144)
SODIUM SERPL-SCNC: 138 MMOL/L (ref 133–144)
WBC # BLD AUTO: 3.1 10E9/L (ref 4–11)
WBC # BLD AUTO: 3.8 10E9/L (ref 4–11)

## 2017-06-10 PROCEDURE — 36415 COLL VENOUS BLD VENIPUNCTURE: CPT | Performed by: INTERNAL MEDICINE

## 2017-06-10 PROCEDURE — 86160 COMPLEMENT ANTIGEN: CPT | Performed by: STUDENT IN AN ORGANIZED HEALTH CARE EDUCATION/TRAINING PROGRAM

## 2017-06-10 PROCEDURE — 00000167 ZZHCL STATISTIC INR NC: Performed by: STUDENT IN AN ORGANIZED HEALTH CARE EDUCATION/TRAINING PROGRAM

## 2017-06-10 PROCEDURE — 36415 COLL VENOUS BLD VENIPUNCTURE: CPT | Performed by: STUDENT IN AN ORGANIZED HEALTH CARE EDUCATION/TRAINING PROGRAM

## 2017-06-10 PROCEDURE — 86235 NUCLEAR ANTIGEN ANTIBODY: CPT | Performed by: STUDENT IN AN ORGANIZED HEALTH CARE EDUCATION/TRAINING PROGRAM

## 2017-06-10 PROCEDURE — 86147 CARDIOLIPIN ANTIBODY EA IG: CPT | Performed by: STUDENT IN AN ORGANIZED HEALTH CARE EDUCATION/TRAINING PROGRAM

## 2017-06-10 PROCEDURE — 80048 BASIC METABOLIC PNL TOTAL CA: CPT | Performed by: INTERNAL MEDICINE

## 2017-06-10 PROCEDURE — 86225 DNA ANTIBODY NATIVE: CPT | Performed by: STUDENT IN AN ORGANIZED HEALTH CARE EDUCATION/TRAINING PROGRAM

## 2017-06-10 PROCEDURE — 40000133 ZZH STATISTIC OT WARD VISIT

## 2017-06-10 PROCEDURE — 25000132 ZZH RX MED GY IP 250 OP 250 PS 637: Performed by: STUDENT IN AN ORGANIZED HEALTH CARE EDUCATION/TRAINING PROGRAM

## 2017-06-10 PROCEDURE — 40000257 ZZH STATISTIC CONSULT NO CHARGE VASC ACCESS

## 2017-06-10 PROCEDURE — 86038 ANTINUCLEAR ANTIBODIES: CPT | Performed by: STUDENT IN AN ORGANIZED HEALTH CARE EDUCATION/TRAINING PROGRAM

## 2017-06-10 PROCEDURE — 80048 BASIC METABOLIC PNL TOTAL CA: CPT

## 2017-06-10 PROCEDURE — 25000132 ZZH RX MED GY IP 250 OP 250 PS 637: Performed by: INTERNAL MEDICINE

## 2017-06-10 PROCEDURE — 86146 BETA-2 GLYCOPROTEIN ANTIBODY: CPT | Performed by: STUDENT IN AN ORGANIZED HEALTH CARE EDUCATION/TRAINING PROGRAM

## 2017-06-10 PROCEDURE — 25000128 H RX IP 250 OP 636: Performed by: INTERNAL MEDICINE

## 2017-06-10 PROCEDURE — 86200 CCP ANTIBODY: CPT | Performed by: STUDENT IN AN ORGANIZED HEALTH CARE EDUCATION/TRAINING PROGRAM

## 2017-06-10 PROCEDURE — 85027 COMPLETE CBC AUTOMATED: CPT

## 2017-06-10 PROCEDURE — 27210437 ZZH NUTRITION PRODUCT SEMIELEM INTERMED LITER

## 2017-06-10 PROCEDURE — 86431 RHEUMATOID FACTOR QUANT: CPT | Performed by: STUDENT IN AN ORGANIZED HEALTH CARE EDUCATION/TRAINING PROGRAM

## 2017-06-10 PROCEDURE — 25000125 ZZHC RX 250: Performed by: INTERNAL MEDICINE

## 2017-06-10 PROCEDURE — 00000146 ZZHCL STATISTIC GLUCOSE BY METER IP

## 2017-06-10 PROCEDURE — 40000556 ZZH STATISTIC PERIPHERAL IV START W US GUIDANCE

## 2017-06-10 PROCEDURE — 85027 COMPLETE CBC AUTOMATED: CPT | Performed by: INTERNAL MEDICINE

## 2017-06-10 PROCEDURE — 85730 THROMBOPLASTIN TIME PARTIAL: CPT | Performed by: STUDENT IN AN ORGANIZED HEALTH CARE EDUCATION/TRAINING PROGRAM

## 2017-06-10 PROCEDURE — 36415 COLL VENOUS BLD VENIPUNCTURE: CPT

## 2017-06-10 PROCEDURE — 97530 THERAPEUTIC ACTIVITIES: CPT | Mod: GO

## 2017-06-10 PROCEDURE — 25000132 ZZH RX MED GY IP 250 OP 250 PS 637

## 2017-06-10 PROCEDURE — 85613 RUSSELL VIPER VENOM DILUTED: CPT | Performed by: STUDENT IN AN ORGANIZED HEALTH CARE EDUCATION/TRAINING PROGRAM

## 2017-06-10 PROCEDURE — 21400006 ZZH R&B CCU INTERMEDIATE UMMC

## 2017-06-10 PROCEDURE — 25000132 ZZH RX MED GY IP 250 OP 250 PS 637: Performed by: NURSE PRACTITIONER

## 2017-06-10 PROCEDURE — 00000401 ZZHCL STATISTIC THROMBIN TIME NC: Performed by: STUDENT IN AN ORGANIZED HEALTH CARE EDUCATION/TRAINING PROGRAM

## 2017-06-10 RX ADMIN — PROPRANOLOL HYDROCHLORIDE 10 MG: 10 TABLET ORAL at 09:16

## 2017-06-10 RX ADMIN — INSULIN ASPART 1 UNITS: 100 INJECTION, SOLUTION INTRAVENOUS; SUBCUTANEOUS at 23:58

## 2017-06-10 RX ADMIN — INSULIN ASPART 2 UNITS: 100 INJECTION, SOLUTION INTRAVENOUS; SUBCUTANEOUS at 19:55

## 2017-06-10 RX ADMIN — INSULIN ASPART 1 UNITS: 100 INJECTION, SOLUTION INTRAVENOUS; SUBCUTANEOUS at 04:06

## 2017-06-10 RX ADMIN — INSULIN ASPART 1 UNITS: 100 INJECTION, SOLUTION INTRAVENOUS; SUBCUTANEOUS at 00:08

## 2017-06-10 RX ADMIN — PANTOPRAZOLE SODIUM 40 MG: 40 TABLET, DELAYED RELEASE ORAL at 09:14

## 2017-06-10 RX ADMIN — ACETAMINOPHEN 650 MG: 325 TABLET, FILM COATED ORAL at 13:00

## 2017-06-10 RX ADMIN — POTASSIUM CHLORIDE 20 MEQ: 750 TABLET, EXTENDED RELEASE ORAL at 14:14

## 2017-06-10 RX ADMIN — PROPRANOLOL HYDROCHLORIDE 10 MG: 10 TABLET ORAL at 14:14

## 2017-06-10 RX ADMIN — ONDANSETRON 4 MG: 4 TABLET, ORALLY DISINTEGRATING ORAL at 13:00

## 2017-06-10 RX ADMIN — INSULIN ASPART 1 UNITS: 100 INJECTION, SOLUTION INTRAVENOUS; SUBCUTANEOUS at 12:12

## 2017-06-10 RX ADMIN — ONDANSETRON 4 MG: 4 TABLET, ORALLY DISINTEGRATING ORAL at 07:00

## 2017-06-10 RX ADMIN — OYSTER SHELL CALCIUM WITH VITAMIN D 2 TABLET: 500; 200 TABLET, FILM COATED ORAL at 09:16

## 2017-06-10 RX ADMIN — FUROSEMIDE 60 MG: 10 INJECTION, SOLUTION INTRAVENOUS at 15:55

## 2017-06-10 RX ADMIN — MULTIVIT AND MINERALS-FERROUS GLUCONATE 9 MG IRON/15 ML ORAL LIQUID 15 ML: at 09:14

## 2017-06-10 RX ADMIN — PROPRANOLOL HYDROCHLORIDE 10 MG: 10 TABLET ORAL at 19:51

## 2017-06-10 RX ADMIN — FUROSEMIDE 60 MG: 10 INJECTION, SOLUTION INTRAVENOUS at 09:17

## 2017-06-10 RX ADMIN — PREDNISONE 15 MG: 5 TABLET ORAL at 09:17

## 2017-06-10 RX ADMIN — INSULIN ASPART 2 UNITS: 100 INJECTION, SOLUTION INTRAVENOUS; SUBCUTANEOUS at 17:21

## 2017-06-10 RX ADMIN — ONDANSETRON 4 MG: 4 TABLET, ORALLY DISINTEGRATING ORAL at 18:53

## 2017-06-10 RX ADMIN — GABAPENTIN 200 MG: 100 CAPSULE ORAL at 14:14

## 2017-06-10 RX ADMIN — DIGOXIN 250 MCG: 125 TABLET ORAL at 09:16

## 2017-06-10 RX ADMIN — FOLIC ACID 1 MG: 1 TABLET ORAL at 09:17

## 2017-06-10 RX ADMIN — GABAPENTIN 200 MG: 100 CAPSULE ORAL at 09:16

## 2017-06-10 RX ADMIN — GABAPENTIN 200 MG: 100 CAPSULE ORAL at 19:51

## 2017-06-10 ASSESSMENT — PAIN DESCRIPTION - DESCRIPTORS: DESCRIPTORS: ACHING

## 2017-06-10 NOTE — PLAN OF CARE
Problem: Goal Outcome Summary  Goal: Goal Outcome Summary  DIAP: Pt admitted on 5/29 with out of hospital cardiac arrest. Acute on chronic thrombocytopenia.    VSS, cardiac monitor shows SR with PAC's . Denies pain except for that in left arm extravasation site. 2L NC, sp02 99%. Generalized edema. Lymph wraps on bilateral lower extremities. Seen by Neurology, placed with C-collar, no precautions ordered. MRI of C- spine and brain ordered. Tube feeds via NG tolerated at 40 ml/ hr. Denies nausea. Loose BM x 1, incontinent. Diet dysphagia level I. Nectar thickened liquids, swallow pills whole.  Calorie counts continues. Left extravasation site changed per WOC orders.  60mg IV lasix given, good UO per brand.  Hgb 6.9 and PLT 28, 2 units of P RBC given Hgb 10.4 post transfusion.  Continue with Plan of care and notify [facility] primary of issue and concerns.

## 2017-06-10 NOTE — PLAN OF CARE
Problem: Goal Outcome Summary  Goal: Goal Outcome Summary  Outcome: Improving  DIAP: Pt is alert and oriented x 4. Up to the chair with the lift. C-collar removed as per Neuro recommendations .  On 2 L 02 Sp 02 %, denies any SOB. Cardiac monitor show SR with frequent PAC's . K= 3.7, electrolyte replacement given. Generalized edema.  Pt reports being nauseated, had smalll emesis x1,  PRN Zofran given. Tube feeding via NG continues Peptamen 1.5 at 40 ml/hr and H2O flush, on sliding scale insulin for blood sugars. Fair to poor appetite on dysphagia level 1 diet with thickened fluids on calorie count. Had BM x1. Repositioned in the bed. Continue to monitor and notify primary [facility] of issues and concerns.

## 2017-06-10 NOTE — PLAN OF CARE
Problem: Goal Outcome Summary  Goal: Goal Outcome Summary  OT 6C: Patient was seen for activities to promote functional use of B UE; she reports improving FMC in right hand and ongoing deficits with FMC in left hand. Goal added to address deficits in FMC. ROM exercises completed B UE with AROM on right and AAROM on left, followed by functional task requiring reaching, grasp/release and placing small objects using left hand. Patient tolerated activity well while seated in upright chair.  Recommend ARU at discharge to increase ADL independence, address deficits in strength and FMC. Patient shows increasing activity tolerance and is motivated to work towards return to PLOF.

## 2017-06-10 NOTE — PLAN OF CARE
Problem: Goal Outcome Summary  Goal: Goal Outcome Summary  Outcome: Improving  Pt A/Ox4, VSS on 2L NC. Pt up with lift/assist 2. Pt's MRI negative, neuro to look at results in AM and decide if pt can remove C collar. Pt nauseated around 9pm, zofran given, nausea gone. Pt has TF running through NG at 40 ml/hr. Pt's dressing on left arm CDI. Pt repositioned frequently overnight. K replaced, recheck in AM. Q4h BG, insulin given per sliding scale. Huynh with good output. ARU at [place]. Continue to monitor and notify MD of questions or concerns.

## 2017-06-10 NOTE — PLAN OF CARE
Problem: Goal Outcome Summary  Goal: Goal Outcome Summary  PT 6C: patient requiring assist x 2 to transfer to EOB and stand from bed.  While standing patient unable to move feet to sidestep at EOB.  Patient motivated to participate in therapy session.      Continue to recommend transfer to ARU setting when stable.

## 2017-06-10 NOTE — PROGRESS NOTES
Social Work Services Progress Note    Hospital Day: 13  Date of Initial Social Work Evaluation:  5/31/17  Collaborated with:  Pt, spouse    Data:  Pt is a 56 year old female being followed by unit [facility] for discharge to either ARU or TCU.    Intervention:  SW met with pt and spouse and introduced self and role.  Pt and spouse did receive their FMLA forms and spouse will be reviewing tomorrow for any further needs.  Pt and spouse had questions about the differences between ARU and TCU.  Pt and spouse at this time are preferring to stay closer to the [facility] and prefer [facility]CU/[facility]U location for services.  Pt is agreeable to keep referral open at both levels of care.  Pt states that she has had a PMR visit with Dr. Shanks.  Pt states she thinks the initial recommendation may be TCU.  Pt is open to meeting with PMR again as she progresses to see if she could qualify for ARU.  Will follow up on this Monday.    Assessment:  Spouse states they have no children for support at home.  Spouse states they have a friend network and siblings of both pt and spouse who can help with cares as needed.    Plan:    Anticipated Disposition:  Facility:  ARU vs TCU pending recommendation by PMR.    Barriers to d/c plan:  Medical stability    Follow Up:  SW will follow up with pt and spouse Monday for continued discharge planning.    DENNIS Larsen, APSW  6C Unit   Phone: [phone]  Pager: [phone]  Unit: [phone]      ___________________________________________________________________________________________________________________________________________________    Referrals in process:   [facility] or [facility]  *[id number]     Referrals Discontinued:  NA    Community Case Management/Community Services in place:   NA

## 2017-06-10 NOTE — PROGRESS NOTES
Calorie Counts  Intake recorded for: 6/9  Kcals: 230  Protein: 15g  # Meals Recorded: 100% 2 pureed peaches, cottage cheese  # Supplements Recorded: 0

## 2017-06-11 ENCOUNTER — APPOINTMENT (OUTPATIENT)
Dept: OCCUPATIONAL THERAPY | Facility: CLINIC | Age: 57
DRG: 260 | End: 2017-06-11
Attending: PSYCHIATRY & NEUROLOGY
Payer: COMMERCIAL

## 2017-06-11 ENCOUNTER — APPOINTMENT (OUTPATIENT)
Dept: PHYSICAL THERAPY | Facility: CLINIC | Age: 57
DRG: 260 | End: 2017-06-11
Attending: PSYCHIATRY & NEUROLOGY
Payer: COMMERCIAL

## 2017-06-11 LAB
ANION GAP SERPL CALCULATED.3IONS-SCNC: 4 MMOL/L (ref 3–14)
ANION GAP SERPL CALCULATED.3IONS-SCNC: 6 MMOL/L (ref 3–14)
BUN SERPL-MCNC: 28 MG/DL (ref 7–30)
BUN SERPL-MCNC: 29 MG/DL (ref 7–30)
CALCIUM SERPL-MCNC: 7 MG/DL (ref 8.5–10.1)
CALCIUM SERPL-MCNC: 7.3 MG/DL (ref 8.5–10.1)
CHLORIDE SERPL-SCNC: 101 MMOL/L (ref 94–109)
CHLORIDE SERPL-SCNC: 99 MMOL/L (ref 94–109)
CO2 SERPL-SCNC: 32 MMOL/L (ref 20–32)
CO2 SERPL-SCNC: 34 MMOL/L (ref 20–32)
CREAT SERPL-MCNC: 0.68 MG/DL (ref 0.52–1.04)
CREAT SERPL-MCNC: 0.73 MG/DL (ref 0.52–1.04)
ERYTHROCYTE [DISTWIDTH] IN BLOOD BY AUTOMATED COUNT: 19.2 % (ref 10–15)
ERYTHROCYTE [DISTWIDTH] IN BLOOD BY AUTOMATED COUNT: 19.4 % (ref 10–15)
GFR SERPL CREATININE-BSD FRML MDRD: 82 ML/MIN/1.7M2
GFR SERPL CREATININE-BSD FRML MDRD: 89 ML/MIN/1.7M2
GLUCOSE BLDC GLUCOMTR-MCNC: 147 MG/DL (ref 70–99)
GLUCOSE BLDC GLUCOMTR-MCNC: 163 MG/DL (ref 70–99)
GLUCOSE BLDC GLUCOMTR-MCNC: 165 MG/DL (ref 70–99)
GLUCOSE BLDC GLUCOMTR-MCNC: 189 MG/DL (ref 70–99)
GLUCOSE BLDC GLUCOMTR-MCNC: 193 MG/DL (ref 70–99)
GLUCOSE BLDC GLUCOMTR-MCNC: 198 MG/DL (ref 70–99)
GLUCOSE BLDC GLUCOMTR-MCNC: 208 MG/DL (ref 70–99)
GLUCOSE BLDC GLUCOMTR-MCNC: 232 MG/DL (ref 70–99)
GLUCOSE SERPL-MCNC: 146 MG/DL (ref 70–99)
GLUCOSE SERPL-MCNC: 200 MG/DL (ref 70–99)
HCT VFR BLD AUTO: 28.6 % (ref 35–47)
HCT VFR BLD AUTO: 31.4 % (ref 35–47)
HGB BLD-MCNC: 10.2 G/DL (ref 11.7–15.7)
HGB BLD-MCNC: 9.2 G/DL (ref 11.7–15.7)
INR PPP: 1.15 (ref 0.86–1.14)
LDH SERPL L TO P-CCNC: 219 U/L (ref 81–234)
MAGNESIUM SERPL-MCNC: 1.6 MG/DL (ref 1.6–2.3)
MCH RBC QN AUTO: 31.4 PG (ref 26.5–33)
MCH RBC QN AUTO: 31.6 PG (ref 26.5–33)
MCHC RBC AUTO-ENTMCNC: 32.2 G/DL (ref 31.5–36.5)
MCHC RBC AUTO-ENTMCNC: 32.5 G/DL (ref 31.5–36.5)
MCV RBC AUTO: 97 FL (ref 78–100)
MCV RBC AUTO: 98 FL (ref 78–100)
PLATELET # BLD AUTO: 31 10E9/L (ref 150–450)
PLATELET # BLD AUTO: 31 10E9/L (ref 150–450)
POTASSIUM SERPL-SCNC: 3.6 MMOL/L (ref 3.4–5.3)
POTASSIUM SERPL-SCNC: 4.1 MMOL/L (ref 3.4–5.3)
RBC # BLD AUTO: 2.93 10E12/L (ref 3.8–5.2)
RBC # BLD AUTO: 3.23 10E12/L (ref 3.8–5.2)
SODIUM SERPL-SCNC: 137 MMOL/L (ref 133–144)
SODIUM SERPL-SCNC: 139 MMOL/L (ref 133–144)
WBC # BLD AUTO: 2.9 10E9/L (ref 4–11)
WBC # BLD AUTO: 3.5 10E9/L (ref 4–11)

## 2017-06-11 PROCEDURE — 25000132 ZZH RX MED GY IP 250 OP 250 PS 637: Performed by: INTERNAL MEDICINE

## 2017-06-11 PROCEDURE — 25000125 ZZHC RX 250: Performed by: INTERNAL MEDICINE

## 2017-06-11 PROCEDURE — 25000132 ZZH RX MED GY IP 250 OP 250 PS 637: Performed by: STUDENT IN AN ORGANIZED HEALTH CARE EDUCATION/TRAINING PROGRAM

## 2017-06-11 PROCEDURE — 83615 LACTATE (LD) (LDH) ENZYME: CPT | Performed by: INTERNAL MEDICINE

## 2017-06-11 PROCEDURE — 25000132 ZZH RX MED GY IP 250 OP 250 PS 637

## 2017-06-11 PROCEDURE — 93005 ELECTROCARDIOGRAM TRACING: CPT

## 2017-06-11 PROCEDURE — 97140 MANUAL THERAPY 1/> REGIONS: CPT | Mod: GO

## 2017-06-11 PROCEDURE — 85610 PROTHROMBIN TIME: CPT | Performed by: INTERNAL MEDICINE

## 2017-06-11 PROCEDURE — 40000193 ZZH STATISTIC PT WARD VISIT: Performed by: PHYSICAL THERAPIST

## 2017-06-11 PROCEDURE — 85027 COMPLETE CBC AUTOMATED: CPT | Performed by: INTERNAL MEDICINE

## 2017-06-11 PROCEDURE — 25000125 ZZHC RX 250

## 2017-06-11 PROCEDURE — 36415 COLL VENOUS BLD VENIPUNCTURE: CPT | Performed by: INTERNAL MEDICINE

## 2017-06-11 PROCEDURE — 83735 ASSAY OF MAGNESIUM: CPT | Performed by: INTERNAL MEDICINE

## 2017-06-11 PROCEDURE — 93010 ELECTROCARDIOGRAM REPORT: CPT | Performed by: INTERNAL MEDICINE

## 2017-06-11 PROCEDURE — 80048 BASIC METABOLIC PNL TOTAL CA: CPT | Performed by: INTERNAL MEDICINE

## 2017-06-11 PROCEDURE — 25000132 ZZH RX MED GY IP 250 OP 250 PS 637: Performed by: NURSE PRACTITIONER

## 2017-06-11 PROCEDURE — 00000146 ZZHCL STATISTIC GLUCOSE BY METER IP

## 2017-06-11 PROCEDURE — 27210437 ZZH NUTRITION PRODUCT SEMIELEM INTERMED LITER

## 2017-06-11 PROCEDURE — 21400006 ZZH R&B CCU INTERMEDIATE UMMC

## 2017-06-11 PROCEDURE — 97530 THERAPEUTIC ACTIVITIES: CPT | Mod: GP | Performed by: PHYSICAL THERAPIST

## 2017-06-11 PROCEDURE — 40000133 ZZH STATISTIC OT WARD VISIT

## 2017-06-11 RX ORDER — METOPROLOL TARTRATE 1 MG/ML
2.5 INJECTION, SOLUTION INTRAVENOUS ONCE
Status: COMPLETED | OUTPATIENT
Start: 2017-06-11 | End: 2017-06-11

## 2017-06-11 RX ORDER — POTASSIUM CHLORIDE 750 MG/1
40 TABLET, EXTENDED RELEASE ORAL ONCE
Status: COMPLETED | OUTPATIENT
Start: 2017-06-11 | End: 2017-06-11

## 2017-06-11 RX ORDER — ATENOLOL 50 MG/1
50 TABLET ORAL DAILY
Status: DISCONTINUED | OUTPATIENT
Start: 2017-06-11 | End: 2017-06-16 | Stop reason: HOSPADM

## 2017-06-11 RX ADMIN — INSULIN ASPART 2 UNITS: 100 INJECTION, SOLUTION INTRAVENOUS; SUBCUTANEOUS at 20:11

## 2017-06-11 RX ADMIN — MULTIVIT AND MINERALS-FERROUS GLUCONATE 9 MG IRON/15 ML ORAL LIQUID 15 ML: at 09:12

## 2017-06-11 RX ADMIN — INSULIN ASPART 1 UNITS: 100 INJECTION, SOLUTION INTRAVENOUS; SUBCUTANEOUS at 23:06

## 2017-06-11 RX ADMIN — PANTOPRAZOLE SODIUM 40 MG: 40 TABLET, DELAYED RELEASE ORAL at 09:13

## 2017-06-11 RX ADMIN — PROPRANOLOL HYDROCHLORIDE 10 MG: 10 TABLET ORAL at 08:19

## 2017-06-11 RX ADMIN — DIGOXIN 250 MCG: 125 TABLET ORAL at 08:19

## 2017-06-11 RX ADMIN — GABAPENTIN 200 MG: 100 CAPSULE ORAL at 15:00

## 2017-06-11 RX ADMIN — INSULIN ASPART 1 UNITS: 100 INJECTION, SOLUTION INTRAVENOUS; SUBCUTANEOUS at 03:25

## 2017-06-11 RX ADMIN — ATENOLOL 50 MG: 50 TABLET ORAL at 11:31

## 2017-06-11 RX ADMIN — ACETAMINOPHEN 650 MG: 325 TABLET, FILM COATED ORAL at 06:59

## 2017-06-11 RX ADMIN — METOPROLOL TARTRATE 2.5 MG: 5 INJECTION INTRAVENOUS at 02:19

## 2017-06-11 RX ADMIN — Medication 2 G: at 09:35

## 2017-06-11 RX ADMIN — OYSTER SHELL CALCIUM WITH VITAMIN D 2 TABLET: 500; 200 TABLET, FILM COATED ORAL at 09:12

## 2017-06-11 RX ADMIN — ONDANSETRON 4 MG: 4 TABLET, ORALLY DISINTEGRATING ORAL at 06:59

## 2017-06-11 RX ADMIN — INSULIN ASPART 2 UNITS: 100 INJECTION, SOLUTION INTRAVENOUS; SUBCUTANEOUS at 13:11

## 2017-06-11 RX ADMIN — ONDANSETRON 4 MG: 4 TABLET, ORALLY DISINTEGRATING ORAL at 02:05

## 2017-06-11 RX ADMIN — INSULIN ASPART 2 UNITS: 100 INJECTION, SOLUTION INTRAVENOUS; SUBCUTANEOUS at 17:34

## 2017-06-11 RX ADMIN — GABAPENTIN 200 MG: 100 CAPSULE ORAL at 20:05

## 2017-06-11 RX ADMIN — FOLIC ACID 1 MG: 1 TABLET ORAL at 09:12

## 2017-06-11 RX ADMIN — ACETAMINOPHEN 650 MG: 325 TABLET, FILM COATED ORAL at 20:05

## 2017-06-11 RX ADMIN — PREDNISONE 15 MG: 5 TABLET ORAL at 09:12

## 2017-06-11 RX ADMIN — GABAPENTIN 200 MG: 100 CAPSULE ORAL at 09:12

## 2017-06-11 RX ADMIN — INSULIN ASPART 1 UNITS: 100 INJECTION, SOLUTION INTRAVENOUS; SUBCUTANEOUS at 08:27

## 2017-06-11 RX ADMIN — POTASSIUM CHLORIDE 40 MEQ: 750 TABLET, EXTENDED RELEASE ORAL at 06:59

## 2017-06-11 NOTE — PLAN OF CARE
Problem: Goal Outcome Summary  Goal: Goal Outcome Summary  Outcome: No Change  Pt A/Ox4, VSS on 1L NC. Pt up with lift/assist 2. Pt given zofran for nausea, nausea gone. Pt has TF running through NG at 40 ml/hr. Pt's dressing on left arm CDI. Pt repositioned frequently overnight. Q4h BG, insulin given per sliding scale. Huynh with good output. Pt went into Afib/Aflutter around 7pm, MD notified (see other notes). Pt sustained Aflutter in 120's around 0200, 2.5mg IV metoprolol given. Pt now in Afib/Aflutter rates , no anticoagulation due to pancytopenia. ARU at [facility]. Continue to monitor and notify MD of questions or concerns.    MD notified that pt was back in Aflutter rate 123, 40meq K given, EKG ordered

## 2017-06-11 NOTE — PROGRESS NOTES
"  Interventional Cardiology Progress Note  Subjective:  - Denies any chest pain, pressure, heaviness or tightness. Her breathing is better  - Overnight went into AFib, hemodynamically stable, rates in the 120s, received 2.5 mg of lopressor IV  - Still c/o nausea with propranolol (needing frequent prn zofran - QTc 366 msec this AM)    Objective:  Most recent vital signs:  /60 (BP Location: Right arm)  Pulse 103  Temp 98.6  F (37  C) (Oral)  Resp 18  Ht 1.473 m (4' 10\")  Wt 80.5 kg (177 lb 8 oz)  SpO2 98%  BMI 37.1 kg/m2  Temp:  [98  F (36.7  C)-98.6  F (37  C)] 98.6  F (37  C)  Heart Rate:  [] 122  Resp:  [18-20] 18  BP: ()/(56-80) 102/60  SpO2:  [97 %-99 %] 98 %  Wt Readings from Last 2 Encounters:   06/11/17 80.5 kg (177 lb 8 oz)   05/23/17 64.4 kg (141 lb 15.6 oz)           Intake/Output Summary (Last 24 hours) at 06/11/17 0952  Last data filed at 06/11/17 0900   Gross per 24 hour   Intake             1160 ml   Output             2925 ml   Net            -1765 ml         Physical exam:  General: Remains somewhat ill appearing, NAD.  NECK: JVD in the clavicular bed.  CARDIAC: No m/r/g appreciated. Irregular rhythm.   RESP: Lungs fairly clear, very diminished to the RLL.  GI: Abd distended, edematous, non-tender. Bowel sounds hypo x 4.  EXTREMITIES: MARSHA LE edema 2/2 drsgs in place, toes w/ + circulation/pink/warm.  SKIN: Left arm drsg in place as well as drsg/wraps to bilateral LE.   NEURO: Alert and oriented X3, CN II-XII grossly intact, no focal neurological deficits noted.    Drains and Tubes: No drains or tubes present  Access: No central lines or devices in place    Labs (Past three days):  CBC    Recent Labs  Lab 06/11/17  0400 06/10/17  1610 06/10/17  1229 06/09/17  1708   WBC 3.5* 3.1* 3.8* 2.9*   RBC 3.23* 3.18* 3.43* 3.28*   HGB 10.2* 10.1* 11.0* 10.4*   HCT 31.4* 31.0* 32.6* 32.1*   MCV 97 98 95 98   MCH 31.6 31.8 32.1 31.7   MCHC 32.5 32.6 33.7 32.4   RDW 19.4* 19.7* 19.9* 19.6* "   PLT 31* 31* 27* 23*     BMP  Recent Labs  Lab 06/11/17  0400 06/10/17  1610 06/10/17  1229 06/09/17  1708  06/07/17  0422 06/06/17  1654 06/06/17  1156 06/06/17  0409    138 137 138  < > 148* 146*  --  146*   POTASSIUM 3.6 4.3 3.7 3.9  < > 3.6 4.1 3.6 3.5   CHLORIDE 101 99 99 104  < > 108 106  --  107   CO2 34* 34* 30 30  < > 36* 36*  --  36*   ANIONGAP 4 5 9 4  < > 5 4  --  4   * 195* 170* 149*  < > 135* 144*  --  138*   BUN 28 30 27 28  < > 37* 40*  --  39*   CR 0.73 0.82 0.70 0.67  < > 0.75 0.78  --  0.84   GFRESTIMATED 82 71 86 >90Non  GFR Calc  < > 80 77  --  70   GFRESTBLACK >90African American GFR Calc 86 >90African American GFR Calc >90African American GFR Calc  < > >90African American GFR Calc >90African American GFR Calc  --  85   VIKTORIYA 7.3* 7.1* 7.5* 7.5*  < > 7.4* 7.4*  --  7.5*   MAG 1.6  --   --   --   --  1.9  --  2.0 2.4*   PHOS  --   --   --   --   --  3.3 1.7* 2.1* 2.6   < > = values in this interval not displayed.  INR    Recent Labs  Lab 06/11/17  0400 06/09/17  0449 06/08/17  0320 06/07/17  0422   INR 1.15* 1.22* 1.23* 1.27*     Liver panel    Recent Labs  Lab 06/05/17  0354   PROTTOTAL 4.2*   ALBUMIN 1.9*   BILITOTAL 1.7*   ALKPHOS 149   AST 43   *         Assessment & Plan:  # Hypoxic respiratory failure  - s/p 7-day course of meropenem/ertapenem (05/30-06/04)  - Will STOP Furosemide, she appears somewhat intravascularly depleted on exam today  - Repleting lytes (Mg 1.6 this AM)      # Out-of-hospital cardiac arrest and cardiogenic shock; Hx of AFib/Aflutter/SVT; sinus arrest  - Continue digoxin at 0.25 mg q24h given multiple episodes of AFib/AFlutter  - Tolerating BB but GI upset w/ Metop - changed to Propanolol, with suboptimal control. 6/11: Changing to atenolol 50 mg daily  - ICD before discharge once stable from hematologic standpoint      # Severe critical illness myopathy  - OT/PT  - Neuro consult requested per PMR and rehabilitation planning,  appreciate recs; appreciate recs      # Severe pancytopenia  - Platelet goal >20K - will follow         - A/w haptoglobin   - Hematology following, appreciate recs    # Anemia  - Monitor Hb  - Consulted Rheumatology in setting of RA history (holding RA medications 2/2 severe pancytopenia and anemia); appreciate recs     # Ischemic liver injury complicated by coagulopathy  - Feeding via NGT      Chronic/inactive issues:  # ESBL UTI, aspiration pneumonia: s/p meropenem/ertapenem course  # CELSA: Baseline Cr 0.7-0.8; peak Cr 1.9  # RA: Weaning of stress dosed steroids completed 06/04; hold immunosuppressants   # Seizure: levetiracetam discontinued 06/05     New Hasa MD  Cardiovascular Disease Fellow  Pager: [phone]      Patient seen and discussed w/ Dr. Wood.

## 2017-06-11 NOTE — PLAN OF CARE
Problem: Goal Outcome Summary  Goal: Goal Outcome Summary  PT/6C     Pt requiring min-mod A x1 for bed mobility and transferring from EOB>chair. Discontinue use of overhead lift for transfers as pt is willing and able to transfer with assist and finds sling very uncomfortable. Pt does have a tendency to lose balance posteriorly in sitting and in standing.     Continue to recommend transfer to ARU when medically appropriate.

## 2017-06-11 NOTE — PLAN OF CARE
Problem: Goal Outcome Summary  Goal: Goal Outcome Summary  OT/EDEMA 6C: Patient with improved edema from toe line to knee creases as evidenced by reductions in 8/10 circumferential measurements, however, now with increased thigh edema. Will lightly wrap MTP to knee creases as to not mobilize more fluid into thighs and have patient focus on elevation above heart level and muscle pump activities. May consider wrapping up to groin in near future, however, do not want this to limit patient movement/mobility. Will follow up on 6/12. Patient may wear wraps x24 hours, however, please remove if increased pain, numbness/tingling or soiling occurs.

## 2017-06-11 NOTE — PROGRESS NOTES
Calorie Counts  Intake recorded for: 6/10  Kcals: 395  Protein: 19g  # Meals Recorded: 100% pureed peaches, lemonade, squash, 75% pureed lasagna, 50% pureed omelet with gravy, 25% pureed berries   # Supplements Recorded: 0

## 2017-06-11 NOTE — PROGRESS NOTES
MRI brain and Cspine reviewed.  Many degenerative changes noted, but no evidence of cord injury or any new acute findings.    Initially was planning on EMG tomorrow to evaluate further LUE weakness, however today on exam weakness has mostly resolved.  Significant improvement in strength noted.  Given technically challenging aspects of EMG (low platelets, lymphedema), will defer at this time.   It is unclear what this was that caused LUE weakness that improved.  She does have carpal tunnel but that doesn't explain proximal weakness.  Possibly some contribution from critical illness as well as her Rheumatoid that improved with steroids.  Inpatient neurology will follow peripherally, if symptoms persist or get worse, please page at [phone] for reevaluation.     Patient seen and discussed with Dr. Abel Hayden PGY-3  Neurology

## 2017-06-11 NOTE — PLAN OF CARE
Problem: Goal Outcome Summary  Goal: Goal Outcome Summary  D: Admitted 5/29 with OTH cardiac arrest.    I: Monitored vitals and assessed pt status.   Running:  PRN:  A: Pt is alert and oriented x 4. Denies any SOB and chest pain.  On 1L NC, VSS, expect switching between atrial flutter & atrial fibrilllation rhythm on the cardiac monitor HR 's, started on atenolol, spontaneously converted to SR-SB HR 50-70 at 1400. Mg replacement given. Generalized edema, lymph wraps changed today. Dressing on the left arm extravasation changed as per orders. Tube feeds Peptamen 1.5 at 40 ml/hr, blood sugar q 4hr on sliding scale insulin. On final day for calorie count, appetite better today, encouraged more PO intake. Uses a walker, up with 2 person assistance to the chair, able to move more today.     P: Continue to monitor Pt status and report changes to treatment team of [facility]

## 2017-06-11 NOTE — PROVIDER NOTIFICATION
MD notified that pt is in Aflutter, rate 115. Pt's BP's 90's/60's, asymptomatic. Continue to monitor per MD and notify if HR greater than 120, or SBP less than 90.

## 2017-06-12 ENCOUNTER — APPOINTMENT (OUTPATIENT)
Dept: OCCUPATIONAL THERAPY | Facility: CLINIC | Age: 57
DRG: 260 | End: 2017-06-12
Attending: PSYCHIATRY & NEUROLOGY
Payer: COMMERCIAL

## 2017-06-12 ENCOUNTER — APPOINTMENT (OUTPATIENT)
Dept: SPEECH THERAPY | Facility: CLINIC | Age: 57
DRG: 260 | End: 2017-06-12
Attending: PSYCHIATRY & NEUROLOGY
Payer: COMMERCIAL

## 2017-06-12 LAB
ANA SER QL IA: NORMAL
ANION GAP SERPL CALCULATED.3IONS-SCNC: 4 MMOL/L (ref 3–14)
ANION GAP SERPL CALCULATED.3IONS-SCNC: 6 MMOL/L (ref 3–14)
B2 GLYCOPROT1 IGG SERPL IA-ACNC: 1.2 U/ML
B2 GLYCOPROT1 IGM SERPL IA-ACNC: NORMAL U/ML
BUN SERPL-MCNC: 24 MG/DL (ref 7–30)
BUN SERPL-MCNC: 25 MG/DL (ref 7–30)
C3 SERPL-MCNC: 113 MG/DL (ref 76–169)
C4 SERPL-MCNC: 25 MG/DL (ref 15–50)
CALCIUM SERPL-MCNC: 7.6 MG/DL (ref 8.5–10.1)
CALCIUM SERPL-MCNC: 7.8 MG/DL (ref 8.5–10.1)
CARDIOLIPIN ANTIBODY IGG: NORMAL GPL-U/ML (ref 0–19.9)
CARDIOLIPIN ANTIBODY IGM: 0.8 MPL-U/ML (ref 0–19.9)
CCP AB SER IA-ACNC: 331 U/ML
CHLORIDE SERPL-SCNC: 100 MMOL/L (ref 94–109)
CHLORIDE SERPL-SCNC: 102 MMOL/L (ref 94–109)
CO2 SERPL-SCNC: 31 MMOL/L (ref 20–32)
CO2 SERPL-SCNC: 33 MMOL/L (ref 20–32)
CREAT SERPL-MCNC: 0.67 MG/DL (ref 0.52–1.04)
CREAT SERPL-MCNC: 0.68 MG/DL (ref 0.52–1.04)
DSDNA AB SER-ACNC: 3 IU/ML
ENA RNP IGG SER IA-ACNC: NORMAL AI (ref 0–0.9)
ENA SCL70 IGG SER IA-ACNC: NORMAL AI (ref 0–0.9)
ENA SM IGG SER-ACNC: NORMAL AI (ref 0–0.9)
ENA SS-A IGG SER IA-ACNC: NORMAL AI (ref 0–0.9)
ENA SS-B IGG SER IA-ACNC: NORMAL AI (ref 0–0.9)
ERYTHROCYTE [DISTWIDTH] IN BLOOD BY AUTOMATED COUNT: 19.1 % (ref 10–15)
ERYTHROCYTE [DISTWIDTH] IN BLOOD BY AUTOMATED COUNT: 19.2 % (ref 10–15)
GFR SERPL CREATININE-BSD FRML MDRD: 89 ML/MIN/1.7M2
GFR SERPL CREATININE-BSD FRML MDRD: ABNORMAL ML/MIN/1.7M2
GLUCOSE BLDC GLUCOMTR-MCNC: 138 MG/DL (ref 70–99)
GLUCOSE BLDC GLUCOMTR-MCNC: 146 MG/DL (ref 70–99)
GLUCOSE BLDC GLUCOMTR-MCNC: 170 MG/DL (ref 70–99)
GLUCOSE BLDC GLUCOMTR-MCNC: 178 MG/DL (ref 70–99)
GLUCOSE BLDC GLUCOMTR-MCNC: 199 MG/DL (ref 70–99)
GLUCOSE BLDC GLUCOMTR-MCNC: 238 MG/DL (ref 70–99)
GLUCOSE SERPL-MCNC: 140 MG/DL (ref 70–99)
GLUCOSE SERPL-MCNC: 208 MG/DL (ref 70–99)
HCT VFR BLD AUTO: 27.6 % (ref 35–47)
HCT VFR BLD AUTO: 29.4 % (ref 35–47)
HGB BLD-MCNC: 9.3 G/DL (ref 11.7–15.7)
HGB BLD-MCNC: 9.5 G/DL (ref 11.7–15.7)
INR PPP: 1.12 (ref 0.86–1.14)
INR PPP: NORMAL (ref 0.86–1.14)
INTERPRETATION ECG - MUSE: NORMAL
LDH SERPL L TO P-CCNC: 203 U/L (ref 81–234)
MAGNESIUM SERPL-MCNC: 1.9 MG/DL (ref 1.6–2.3)
MCH RBC QN AUTO: 31.4 PG (ref 26.5–33)
MCH RBC QN AUTO: 32.3 PG (ref 26.5–33)
MCHC RBC AUTO-ENTMCNC: 32.3 G/DL (ref 31.5–36.5)
MCHC RBC AUTO-ENTMCNC: 33.7 G/DL (ref 31.5–36.5)
MCV RBC AUTO: 96 FL (ref 78–100)
MCV RBC AUTO: 97 FL (ref 78–100)
PLATELET # BLD AUTO: 31 10E9/L (ref 150–450)
PLATELET # BLD AUTO: 44 10E9/L (ref 150–450)
POTASSIUM SERPL-SCNC: 3.8 MMOL/L (ref 3.4–5.3)
POTASSIUM SERPL-SCNC: 4 MMOL/L (ref 3.4–5.3)
RBC # BLD AUTO: 2.88 10E12/L (ref 3.8–5.2)
RBC # BLD AUTO: 3.03 10E12/L (ref 3.8–5.2)
RHEUMATOID FACT SER NEPH-ACNC: <20 IU/ML (ref 0–20)
SODIUM SERPL-SCNC: 137 MMOL/L (ref 133–144)
SODIUM SERPL-SCNC: 139 MMOL/L (ref 133–144)
WBC # BLD AUTO: 2.5 10E9/L (ref 4–11)
WBC # BLD AUTO: 2.6 10E9/L (ref 4–11)

## 2017-06-12 PROCEDURE — 00000146 ZZHCL STATISTIC GLUCOSE BY METER IP

## 2017-06-12 PROCEDURE — 92526 ORAL FUNCTION THERAPY: CPT | Mod: GN | Performed by: SPEECH-LANGUAGE PATHOLOGIST

## 2017-06-12 PROCEDURE — 85027 COMPLETE CBC AUTOMATED: CPT | Performed by: INTERNAL MEDICINE

## 2017-06-12 PROCEDURE — 80048 BASIC METABOLIC PNL TOTAL CA: CPT | Performed by: INTERNAL MEDICINE

## 2017-06-12 PROCEDURE — 40000225 ZZH STATISTIC SLP WARD VISIT: Performed by: SPEECH-LANGUAGE PATHOLOGIST

## 2017-06-12 PROCEDURE — 83735 ASSAY OF MAGNESIUM: CPT | Performed by: INTERNAL MEDICINE

## 2017-06-12 PROCEDURE — 83615 LACTATE (LD) (LDH) ENZYME: CPT | Performed by: INTERNAL MEDICINE

## 2017-06-12 PROCEDURE — 25000132 ZZH RX MED GY IP 250 OP 250 PS 637

## 2017-06-12 PROCEDURE — 36415 COLL VENOUS BLD VENIPUNCTURE: CPT | Performed by: INTERNAL MEDICINE

## 2017-06-12 PROCEDURE — 97140 MANUAL THERAPY 1/> REGIONS: CPT | Mod: GO

## 2017-06-12 PROCEDURE — 21400006 ZZH R&B CCU INTERMEDIATE UMMC

## 2017-06-12 PROCEDURE — 25000132 ZZH RX MED GY IP 250 OP 250 PS 637: Performed by: INTERNAL MEDICINE

## 2017-06-12 PROCEDURE — 25000132 ZZH RX MED GY IP 250 OP 250 PS 637: Performed by: STUDENT IN AN ORGANIZED HEALTH CARE EDUCATION/TRAINING PROGRAM

## 2017-06-12 PROCEDURE — 40000133 ZZH STATISTIC OT WARD VISIT

## 2017-06-12 PROCEDURE — 25000132 ZZH RX MED GY IP 250 OP 250 PS 637: Performed by: NURSE PRACTITIONER

## 2017-06-12 PROCEDURE — 97530 THERAPEUTIC ACTIVITIES: CPT | Mod: GO

## 2017-06-12 PROCEDURE — 85610 PROTHROMBIN TIME: CPT | Performed by: INTERNAL MEDICINE

## 2017-06-12 PROCEDURE — 40000556 ZZH STATISTIC PERIPHERAL IV START W US GUIDANCE

## 2017-06-12 PROCEDURE — 27210437 ZZH NUTRITION PRODUCT SEMIELEM INTERMED LITER

## 2017-06-12 PROCEDURE — 25000125 ZZHC RX 250: Performed by: INTERNAL MEDICINE

## 2017-06-12 RX ADMIN — GABAPENTIN 200 MG: 100 CAPSULE ORAL at 08:18

## 2017-06-12 RX ADMIN — INSULIN ASPART 2 UNITS: 100 INJECTION, SOLUTION INTRAVENOUS; SUBCUTANEOUS at 16:48

## 2017-06-12 RX ADMIN — ACETAMINOPHEN 650 MG: 325 TABLET, FILM COATED ORAL at 19:52

## 2017-06-12 RX ADMIN — ATENOLOL 50 MG: 50 TABLET ORAL at 08:18

## 2017-06-12 RX ADMIN — INSULIN ASPART 2 UNITS: 100 INJECTION, SOLUTION INTRAVENOUS; SUBCUTANEOUS at 19:57

## 2017-06-12 RX ADMIN — DIGOXIN 250 MCG: 125 TABLET ORAL at 08:17

## 2017-06-12 RX ADMIN — PREDNISONE 15 MG: 5 TABLET ORAL at 08:18

## 2017-06-12 RX ADMIN — INSULIN ASPART 1 UNITS: 100 INJECTION, SOLUTION INTRAVENOUS; SUBCUTANEOUS at 08:20

## 2017-06-12 RX ADMIN — PANTOPRAZOLE SODIUM 40 MG: 40 TABLET, DELAYED RELEASE ORAL at 08:19

## 2017-06-12 RX ADMIN — POTASSIUM CHLORIDE 20 MEQ: 750 TABLET, EXTENDED RELEASE ORAL at 22:19

## 2017-06-12 RX ADMIN — GABAPENTIN 200 MG: 100 CAPSULE ORAL at 17:14

## 2017-06-12 RX ADMIN — OYSTER SHELL CALCIUM WITH VITAMIN D 2 TABLET: 500; 200 TABLET, FILM COATED ORAL at 08:18

## 2017-06-12 RX ADMIN — MULTIVIT AND MINERALS-FERROUS GLUCONATE 9 MG IRON/15 ML ORAL LIQUID 15 ML: at 08:18

## 2017-06-12 RX ADMIN — INSULIN ASPART 1 UNITS: 100 INJECTION, SOLUTION INTRAVENOUS; SUBCUTANEOUS at 17:13

## 2017-06-12 RX ADMIN — FOLIC ACID 1 MG: 1 TABLET ORAL at 08:17

## 2017-06-12 RX ADMIN — INSULIN ASPART 1 UNITS: 100 INJECTION, SOLUTION INTRAVENOUS; SUBCUTANEOUS at 23:47

## 2017-06-12 RX ADMIN — GABAPENTIN 200 MG: 100 CAPSULE ORAL at 22:02

## 2017-06-12 NOTE — PLAN OF CARE
Problem: Goal Outcome Summary  Goal: Goal Outcome Summary  Outcome: Improving  Pt A/Ox4, VSS, weaned off O2. Pt up with assist 2 and walker. Pt has TF running through NG at 40 ml/hr. Pt's dressing on left arm CDI. Pt repositioned frequently overnight. Q4h BG, insulin given per sliding scale. Huynh with good output. Pt started on atenolol yesterday. Pt in SR/SB during shift. ARU at [place]. Continue to monitor and notify MD of questions or concerns.

## 2017-06-12 NOTE — PROGRESS NOTES
CLINICAL NUTRITION SERVICES     Nutrition Prescription    RECOMMENDATIONS FOR MDs/PROVIDERS TO ORDER:  None    Future/Additional Recommendations:  Continue TFs as ordered at this time. For all recs, see prior nutrition notes.     Kcal counts:  6/8     70 kcals and 1 g protein (one meal/s and no supplement/s recorded)  6/9    230 kcals and 15 g protein (meal/s and no supplement/s recorded)  6/10  395 kcals and 19 g protein (meal/s and no supplement/s recorded)  6/11  444 kcals and 12 g protein (meal/s and no supplement/s recorded)  *  Pt consumed a four-day average of 285 kcals and 12 g protein daily. Inadequate oral intake although gradual improvement in kcal intake. Kcal counts do not meet estimated needs of 1200 - 1440 kcals/day (25 - 30 kcals/kg) and 62 - 72+ grams protein/day (1.3 - 1.5+ grams of pro/kg).       INTERVENTIONS:  Implementation:  None    Follow up/Monitoring:  Will continue to follow pt.    Inés Brown, MS, RD, LD, [facility]   6C Pgr:  [phone]

## 2017-06-12 NOTE — PROGRESS NOTES
CARDIOLOGY CSI PROGRESS NOTE    SUBJECTIVE:  Unremarkable interval course. Has progressed to transfers with assist. No dyspnea, CP.     ROS otherwise negative.    OBJECTIVE:  Vital signs:  109/56 (Range 100-119/56-74)  59 (HR )  18 (RR 18)  98 (SpO2 93-99%)  170 lbs 8 oz (06/11 177 lbs)     I/O: +290  Intake: 1040 (120 PO, 920 enteral)  Output: 750 UO       PHYSICAL EXAM:  Gen: Looks well, sitting in chair  HEENT: MMM, NJT  Resp: No signs of resp distress, decreased breath sounds right base  CVS: No JVD, S1+S2 with 3/6 ESM loudest at RUSB  Abdo: Nd, S, NT, +BS  Extremities: Warm, well-perfused, no edema  Neuro: GCS 15/15    Lab Test  06/11/17   1605   HGB  (9.2)   HCT  (28.6*)   WBC  (2.9*)   MCV  98   MCH  31.4   MCHC  32.2   RDW  19.2*   PLT  (31*)   NA  (137)   POTASSIUM  (4.1)   CHLORIDE  (99)   CO2  (32)   BUN  (29)   CR  (0.68)   GLC  200*   VIKTORIYA  7.0*   ALBUMIN   --    BILITOTAL   --    ALKPHOS   --    AST   --    ALT   --        Micro:  Nil new  Imaging:  Nil new     Cardiac meds: Atenolol 50 mg q24h, digoxin 250 micrograms daily  Non-cardiac meds: Calcium carbonate-vit D, folic acid, gabapentin 200 mg TID, insulin, pantoprazole 40 mg q24h, prednisone 15 mg q24h  Drips: nil     ASSESSMENT/PLAN:  Mrs. Amelia Michel is a 56-year old lady with a PMHx of VSD repair (1969), RA, and a recent diagnosis of AFib/AFlutter/SVT who presented to [facility] on 05/29 after OOH cardiac arrest with shockable rhythm. After ROSC in the field, she was transferred to [facility] for further management. Extubated 06/04.    - Hypoxic respiratory failure   - s/p 7-day course of meropenem/ertapenem (05/30-06/04)   - Hold furosemide       - Out-of-hospital cardiac arrest and cardiogenic shock; Hx of AFib/Aflutter/SVT; sinus arrest   - Continue digoxin 250 micrograms q24h    - Changed to Atenolol 2/2 ongoing N/V w/ Metop/Propanolol    - ICD before discharge (Discussed w/ EP who will decide on plan for this)      - Severe critical illness  myopathy   - OT/PT      - Severe pancytopenia due to hypoproliferative bone marrow    - s/p stress dose hydrocortisone 05/29-06/04    - Platelet goal >20K                 - Rheumatology and Hematology following     - Ischemic liver injury complicated by coagulopathy   - Feeding via NGT      Chronic/inactive issues:  - ESBL UTI, aspiration pneumonia: s/p meropenem/ertapenem course  - CELSA: Baseline Cr 0.7-0.8; peak Cr 1.9  - RA: Prednisone started per Rheum recs; continue to hold immunosuppressants   - Seizure: levetiracetam discontinued 06/05    Seen and staffed with Dr. Samuels.    Gilbert Og  Cardiology Fellow  Pager: [phone]

## 2017-06-12 NOTE — PLAN OF CARE
Problem: Goal Outcome Summary  Goal: Goal Outcome Summary  Outcome: No Change  VSS.  A&Ox4, on RA.  SR 80s-90s this AM.  Converted to Afib/Aflutter 90s-100s at 1300.  Service aware.  Potassium (3.8) and magnesium (1.9) replaced.  NGT in place w/ TF at 40 ml/hr.  Pt advanced to dysphagia level 2.  Huynh catheter in place w/ good UO.  Pt up w/1 and a walker to chair for meals.  R PIV SL.  L PIV extravasation site dressing CDI.  Lymph wraps replaced today.  Blood sugars assessed Q4H and managed w/SSI.  Will continue to monitor and notify CSI with any concerns or questions.

## 2017-06-12 NOTE — PLAN OF CARE
Problem: Goal Outcome Summary  Goal: Goal Outcome Summary  Outcome: Therapy, progress toward functional goals as expected  SLP: Patient up in chair for breakfast and taking pills. RN reports no problems with swallowing. Patient afebrile and breathing comfortably on room air. Patient reported intermittent hoarse vocal quality. Patient swallowed multiple pills whole with oatmeal without difficulty. Patient also consumed 3 bites semi-solid texture, 2 ice chips, 2 sips water by spoon and 3 sips water by cup. No overt aspiration signs occurred. Note slow but functional mastication of semi-solid texture and mild swallow incoordination with audible swallows of thin liquid. Recommend advance textures to dysphagia diet level 2 and continue nectar thick liquids given swallow precautions (up in chair or fully upright, small bites/sips, rest breaks as needed). Provided written and verbal education to patient/family. Consulted with RN and MD. Will f/u for diet tolerance and readiness for advancement. Recommend acute rehab at discharge.

## 2017-06-12 NOTE — PROGRESS NOTES
Calorie Counts  Intake recorded for: 6/11  Kcals: 444  Protein: 12g  # Meals Recorded: 100% pureed peaches, squash, nectar thick cranberry juice, pureed banana, 50% pureed beef with gravy, oatmeal  # Supplements Recorded: 0

## 2017-06-13 ENCOUNTER — APPOINTMENT (OUTPATIENT)
Dept: OCCUPATIONAL THERAPY | Facility: CLINIC | Age: 57
DRG: 260 | End: 2017-06-13
Attending: NURSE PRACTITIONER
Payer: COMMERCIAL

## 2017-06-13 ENCOUNTER — APPOINTMENT (OUTPATIENT)
Dept: SPEECH THERAPY | Facility: CLINIC | Age: 57
DRG: 260 | End: 2017-06-13
Attending: PSYCHIATRY & NEUROLOGY
Payer: COMMERCIAL

## 2017-06-13 ENCOUNTER — APPOINTMENT (OUTPATIENT)
Dept: PHYSICAL THERAPY | Facility: CLINIC | Age: 57
DRG: 260 | End: 2017-06-13
Attending: PSYCHIATRY & NEUROLOGY
Payer: COMMERCIAL

## 2017-06-13 LAB
ANION GAP SERPL CALCULATED.3IONS-SCNC: 4 MMOL/L (ref 3–14)
ANION GAP SERPL CALCULATED.3IONS-SCNC: 4 MMOL/L (ref 3–14)
BUN SERPL-MCNC: 23 MG/DL (ref 7–30)
BUN SERPL-MCNC: 30 MG/DL (ref 7–30)
CALCIUM SERPL-MCNC: 7.1 MG/DL (ref 8.5–10.1)
CALCIUM SERPL-MCNC: 7.8 MG/DL (ref 8.5–10.1)
CHLORIDE SERPL-SCNC: 103 MMOL/L (ref 94–109)
CHLORIDE SERPL-SCNC: 105 MMOL/L (ref 94–109)
CO2 SERPL-SCNC: 29 MMOL/L (ref 20–32)
CO2 SERPL-SCNC: 30 MMOL/L (ref 20–32)
CREAT SERPL-MCNC: 0.64 MG/DL (ref 0.52–1.04)
CREAT SERPL-MCNC: 0.68 MG/DL (ref 0.52–1.04)
ERYTHROCYTE [DISTWIDTH] IN BLOOD BY AUTOMATED COUNT: 19.4 % (ref 10–15)
ERYTHROCYTE [DISTWIDTH] IN BLOOD BY AUTOMATED COUNT: 19.4 % (ref 10–15)
GFR SERPL CREATININE-BSD FRML MDRD: 89 ML/MIN/1.7M2
GFR SERPL CREATININE-BSD FRML MDRD: ABNORMAL ML/MIN/1.7M2
GLUCOSE BLDC GLUCOMTR-MCNC: 201 MG/DL (ref 70–99)
GLUCOSE BLDC GLUCOMTR-MCNC: 206 MG/DL (ref 70–99)
GLUCOSE BLDC GLUCOMTR-MCNC: 207 MG/DL (ref 70–99)
GLUCOSE BLDC GLUCOMTR-MCNC: 224 MG/DL (ref 70–99)
GLUCOSE BLDC GLUCOMTR-MCNC: 228 MG/DL (ref 70–99)
GLUCOSE BLDC GLUCOMTR-MCNC: 89 MG/DL (ref 70–99)
GLUCOSE BLDC GLUCOMTR-MCNC: 94 MG/DL (ref 70–99)
GLUCOSE SERPL-MCNC: 217 MG/DL (ref 70–99)
GLUCOSE SERPL-MCNC: 94 MG/DL (ref 70–99)
HCT VFR BLD AUTO: 29 % (ref 35–47)
HCT VFR BLD AUTO: 29.1 % (ref 35–47)
HGB BLD-MCNC: 9.4 G/DL (ref 11.7–15.7)
HGB BLD-MCNC: 9.7 G/DL (ref 11.7–15.7)
INR PPP: 1.14 (ref 0.86–1.14)
LACTATE BLD-SCNC: 1.7 MMOL/L (ref 0.7–2.1)
MAGNESIUM SERPL-MCNC: 2 MG/DL (ref 1.6–2.3)
MCH RBC QN AUTO: 31.4 PG (ref 26.5–33)
MCH RBC QN AUTO: 32 PG (ref 26.5–33)
MCHC RBC AUTO-ENTMCNC: 32.3 G/DL (ref 31.5–36.5)
MCHC RBC AUTO-ENTMCNC: 33.4 G/DL (ref 31.5–36.5)
MCV RBC AUTO: 96 FL (ref 78–100)
MCV RBC AUTO: 97 FL (ref 78–100)
PLATELET # BLD AUTO: 41 10E9/L (ref 150–450)
PLATELET # BLD AUTO: 52 10E9/L (ref 150–450)
POTASSIUM SERPL-SCNC: 4.4 MMOL/L (ref 3.4–5.3)
POTASSIUM SERPL-SCNC: 4.6 MMOL/L (ref 3.4–5.3)
RBC # BLD AUTO: 2.99 10E12/L (ref 3.8–5.2)
RBC # BLD AUTO: 3.03 10E12/L (ref 3.8–5.2)
SODIUM SERPL-SCNC: 136 MMOL/L (ref 133–144)
SODIUM SERPL-SCNC: 140 MMOL/L (ref 133–144)
WBC # BLD AUTO: 2.7 10E9/L (ref 4–11)
WBC # BLD AUTO: 2.9 10E9/L (ref 4–11)

## 2017-06-13 PROCEDURE — 36415 COLL VENOUS BLD VENIPUNCTURE: CPT | Performed by: INTERNAL MEDICINE

## 2017-06-13 PROCEDURE — 25000125 ZZHC RX 250: Performed by: INTERNAL MEDICINE

## 2017-06-13 PROCEDURE — 85027 COMPLETE CBC AUTOMATED: CPT | Performed by: INTERNAL MEDICINE

## 2017-06-13 PROCEDURE — 40000225 ZZH STATISTIC SLP WARD VISIT: Performed by: SPEECH-LANGUAGE PATHOLOGIST

## 2017-06-13 PROCEDURE — 25000132 ZZH RX MED GY IP 250 OP 250 PS 637

## 2017-06-13 PROCEDURE — 97530 THERAPEUTIC ACTIVITIES: CPT | Mod: GP

## 2017-06-13 PROCEDURE — 25000132 ZZH RX MED GY IP 250 OP 250 PS 637: Performed by: STUDENT IN AN ORGANIZED HEALTH CARE EDUCATION/TRAINING PROGRAM

## 2017-06-13 PROCEDURE — 99212 OFFICE O/P EST SF 10 MIN: CPT

## 2017-06-13 PROCEDURE — 80048 BASIC METABOLIC PNL TOTAL CA: CPT | Performed by: INTERNAL MEDICINE

## 2017-06-13 PROCEDURE — 92526 ORAL FUNCTION THERAPY: CPT | Mod: GN | Performed by: SPEECH-LANGUAGE PATHOLOGIST

## 2017-06-13 PROCEDURE — 40000193 ZZH STATISTIC PT WARD VISIT

## 2017-06-13 PROCEDURE — 25000125 ZZHC RX 250: Performed by: STUDENT IN AN ORGANIZED HEALTH CARE EDUCATION/TRAINING PROGRAM

## 2017-06-13 PROCEDURE — 83605 ASSAY OF LACTIC ACID: CPT | Performed by: INTERNAL MEDICINE

## 2017-06-13 PROCEDURE — 40000133 ZZH STATISTIC OT WARD VISIT

## 2017-06-13 PROCEDURE — 25000132 ZZH RX MED GY IP 250 OP 250 PS 637: Performed by: INTERNAL MEDICINE

## 2017-06-13 PROCEDURE — 97530 THERAPEUTIC ACTIVITIES: CPT | Mod: GO

## 2017-06-13 PROCEDURE — 83735 ASSAY OF MAGNESIUM: CPT | Performed by: INTERNAL MEDICINE

## 2017-06-13 PROCEDURE — 21400006 ZZH R&B CCU INTERMEDIATE UMMC

## 2017-06-13 PROCEDURE — 00000146 ZZHCL STATISTIC GLUCOSE BY METER IP

## 2017-06-13 PROCEDURE — 27210437 ZZH NUTRITION PRODUCT SEMIELEM INTERMED LITER

## 2017-06-13 PROCEDURE — 97116 GAIT TRAINING THERAPY: CPT | Mod: GP

## 2017-06-13 PROCEDURE — 97140 MANUAL THERAPY 1/> REGIONS: CPT | Mod: GO

## 2017-06-13 PROCEDURE — 85610 PROTHROMBIN TIME: CPT | Performed by: INTERNAL MEDICINE

## 2017-06-13 RX ORDER — PANTOPRAZOLE SODIUM 40 MG/1
40 TABLET, DELAYED RELEASE ORAL EVERY MORNING
Status: DISCONTINUED | OUTPATIENT
Start: 2017-06-13 | End: 2017-06-16 | Stop reason: HOSPADM

## 2017-06-13 RX ORDER — MULTIPLE VITAMINS W/ MINERALS TAB 9MG-400MCG
1 TAB ORAL DAILY
Status: DISCONTINUED | OUTPATIENT
Start: 2017-06-13 | End: 2017-06-16 | Stop reason: HOSPADM

## 2017-06-13 RX ORDER — DIPHENHYDRAMINE HCL 25 MG
25 CAPSULE ORAL ONCE
Status: COMPLETED | OUTPATIENT
Start: 2017-06-13 | End: 2017-06-13

## 2017-06-13 RX ADMIN — DIGOXIN 250 MCG: 125 TABLET ORAL at 07:58

## 2017-06-13 RX ADMIN — GABAPENTIN 200 MG: 100 CAPSULE ORAL at 19:59

## 2017-06-13 RX ADMIN — PANTOPRAZOLE SODIUM 40 MG: 40 TABLET, DELAYED RELEASE ORAL at 09:25

## 2017-06-13 RX ADMIN — ACETAMINOPHEN 650 MG: 325 TABLET, FILM COATED ORAL at 11:55

## 2017-06-13 RX ADMIN — GABAPENTIN 200 MG: 100 CAPSULE ORAL at 07:59

## 2017-06-13 RX ADMIN — MULTIPLE VITAMINS W/ MINERALS TAB 1 TABLET: TAB at 09:25

## 2017-06-13 RX ADMIN — ATENOLOL 50 MG: 50 TABLET ORAL at 07:59

## 2017-06-13 RX ADMIN — INSULIN ASPART 2 UNITS: 100 INJECTION, SOLUTION INTRAVENOUS; SUBCUTANEOUS at 20:00

## 2017-06-13 RX ADMIN — MICONAZOLE NITRATE: 2 POWDER TOPICAL at 20:01

## 2017-06-13 RX ADMIN — INSULIN ASPART 2 UNITS: 100 INJECTION, SOLUTION INTRAVENOUS; SUBCUTANEOUS at 23:52

## 2017-06-13 RX ADMIN — INSULIN ASPART 2 UNITS: 100 INJECTION, SOLUTION INTRAVENOUS; SUBCUTANEOUS at 11:58

## 2017-06-13 RX ADMIN — MICONAZOLE NITRATE: 2 POWDER TOPICAL at 14:12

## 2017-06-13 RX ADMIN — GABAPENTIN 200 MG: 100 CAPSULE ORAL at 14:12

## 2017-06-13 RX ADMIN — FOLIC ACID 1 MG: 1 TABLET ORAL at 07:58

## 2017-06-13 RX ADMIN — OYSTER SHELL CALCIUM WITH VITAMIN D 2 TABLET: 500; 200 TABLET, FILM COATED ORAL at 07:58

## 2017-06-13 RX ADMIN — ACETAMINOPHEN 650 MG: 325 TABLET, FILM COATED ORAL at 19:59

## 2017-06-13 RX ADMIN — PREDNISONE 15 MG: 5 TABLET ORAL at 07:58

## 2017-06-13 RX ADMIN — DIPHENHYDRAMINE HYDROCHLORIDE 25 MG: 25 CAPSULE ORAL at 21:34

## 2017-06-13 RX ADMIN — INSULIN ASPART 2 UNITS: 100 INJECTION, SOLUTION INTRAVENOUS; SUBCUTANEOUS at 16:38

## 2017-06-13 RX ADMIN — Medication 2 G: at 07:59

## 2017-06-13 ASSESSMENT — PAIN DESCRIPTION - DESCRIPTORS
DESCRIPTORS: ACHING
DESCRIPTORS: CONSTANT

## 2017-06-13 NOTE — PROGRESS NOTES
Social Work Services Progress Note    Hospital Day: 16  Date of Initial Social Work Evaluation:  5/31/17  Collaborated with:  Pt, [facility] admissions    Data:  Pt is a 56 year old female being followed by unit [facility] for discharge to either ARU or TCU.    Intervention:  SW met with pt to discuss her choice around ARU vs TCU.  Pt states that she has been feeling more fatigued with her steroid medication and feels a TCU level of care is a better fit.  Pt's first choice for TCU continues to be [facility].  SW will update rehab admissions liaison.  Pt is interested in ARU if she progresses physically.    Assessment:  No updates at this time.  Spouse continues to be source of support.  Pt motivated to get back to independence.    Plan:    Anticipated Disposition:  Facility:  Pt's choice is [facility] TCU.    Barriers to d/c plan:  Medical stability    Follow Up:  SW will follow for discharge planning    DENNIS Larsen, APSW  6C Unit   Phone: [phone]  Pager: [phone]  Unit: [phone]      ___________________________________________________________________________________________________________________________________________________    Referrals in process:   [facility] TCU preferred by pt.  *[id number]     Referrals Discontinued:  NA    Community Case Management/Community Services in place:   SHELBY

## 2017-06-13 NOTE — PLAN OF CARE
Problem: Goal Outcome Summary  Goal: Goal Outcome Summary  Vss.  Monitor shows atrial fib 70s-105.  Tf off tonight for ? icd placement. Today.  Npo .  fsg at 0400 was 89.  fsg at 0600 94.  Huynh with clear anselmo urine.  Groins slightly reddened.  Antifungal powder ordered per md.  Slept.  Will monitor and notify md of changes or issues.

## 2017-06-13 NOTE — PLAN OF CARE
Problem: Goal Outcome Summary  Goal: Goal Outcome Summary  OT/6C:  Pt not appropriate for therapy this AM upon attempt due to HR in the 130s at rest.  Will check back this PM as schedule permits.

## 2017-06-13 NOTE — PLAN OF CARE
Problem: Goal Outcome Summary  Goal: Goal Outcome Summary  PT 6C: Pt shows improvement in mobility today.  Ambulates 65ft x2 with FWW and CGA-SBAx1.  STS with CGAx1 and FWW.  Able to use log roll technique to transfer from supine<>sit with min Ax1.  6 step ups on 6 in aerobic step with FWW and CGAx1.  Pt concerned about her tolerance for ARU.  PT instructed pt that she is indicated for ARU and will be able to tolerate therapy.  Pt agrees and is on board for d/c to ARU.

## 2017-06-13 NOTE — PLAN OF CARE
Problem: Goal Outcome Summary  Goal: Goal Outcome Summary  OT/EDEMA 6C: Patient tolerating wear of gradient compression bandaging well according to set 24 hour schedule, however, with continued pitting edema over dorsum of feet and around knees. Appropriate to proceed with compression wraps to aid in fluid movement and GCB donned using light compression from MTP to knee creases. Goal is eventual return to thigh high Jobst stockings once pitting levels decrease and Platelets increase. Patient may wear wraps x24-48 hours, however, please remove if increased pain, numbness/tingling or soiling occurs.

## 2017-06-13 NOTE — PLAN OF CARE
Problem: Goal Outcome Summary  Goal: Goal Outcome Summary  Outcome: Therapy, progress toward functional goals as expected  SLP: Patient seen for swallow treatment. Patient reports dry mouth and difficulty with dry textured foods (e.g. scrambled eggs and muffin). During session patient consumed 3 ice chips, 2 oz. water by cup and 2 bites Cheerios with milk. Note slow but functional mastication of solid textures. Patient independently alternated liquids and solids. No overt aspiration signs occurred across consistencies with small controlled bites and sips when seated upright fully. Recommend continue dysphagia diet level 2 textures and advance liquids to thin given set up assist and check in supervision and swallow precautions (fully upright positioning, no straws, small single bites/sips, alternate consistencies). Monitor closely for signs/symptoms of aspiration and thicken liquids to nectar consistency if occur. Fatigue may impact swallow performance. Will f/u for diet tolerance and readiness for advancement. Provided written and verbal education and consulted with RN and MD. Recommend acute rehab at discharge.

## 2017-06-13 NOTE — PROGRESS NOTES
[facility] Nurse Inpatient Wound Assessment     Follow up Assessment of wound(s) on pt's:   Left arm    Data:     Patient History:      per MD note(s):  56 year old female with a past history of HTN, RA, VSD s/p repair in 1969s, recent hospitalization for a fib/a flutter/SVT 5/15 - 5/23 who is admitted to [facility] ICU for therapeutic hypothermia after a cardiac arrest and was incidentally found to have loss of left radial pulse on nursing doppler checks. 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 No DVTs.   ECMO removed, patient extubated, up in chair this visit  [facility] to assess left arm extravasation site    Moisture Management:  Dressings    Current Diet / Nutrition:         Active Diet Order      Dysphagia Diet Level 2 [facility] Altered Thin Liquids (water, ice chips, juice, milk gelatin, ice cream, etc)        Tube feeding       Jay Score  Avg: 15.2  Min: 9  Max: 23   Recent Labs   Lab Test  06/06/17   0409   06/05/17   0354   06/04/17   0408   05/31/17   1018   ALBUMIN   --    --   1.9*   --    --    < >  1.9*   HGB  8.1*   < >  8.4*   < >  8.1*   < >  8.3*   INR  1.27*   --   1.30*   --   1.30*   < >  3.38*   WBC  3.0*   < >  3.3*   < >  4.0   < >  7.5   A1C   --    --    --    --    --    --   Canceled, Test credited   UNABLE TO CALCULATE % HBA1C DUE TO LOW HGB AND/OR LOW HGBA1C  NOTIFIED CHELSEA BEE RN AT 1253 ON 5/31/17 [facility]     CRP   --    --    --    --   23.0*   < >   --     < > = values in this interval not displayed.       Wound Assessment :   Left arm  Wound History:  Extravasation wound with large area of epidermal loss    5/30/17 photo                                                        6/13/17 photo      Wound Base: 60% tan slough/necrosis in central wound with surrounding 40% mottled dermis, continues to weep medially.  New epithelium at edges    Specific Dimensions  (length x width x depth, in cm) :   10 x 6.5 x 0.1 cm     Palpation of the wound bed:  edema    Periwound Skin: intact,      Drainage:  . Amount: moderate . Color: serous     Odor: none    Pain:  At elbow, nagging    Has been having difficulty with the dressings becoming dislodged with movement      Intervention:     Patient's chart evaluated.      Wound(s) was assessed    Wound Care: was done: changed dressing, Visual inspection    Orders  Updated    Supplies  Polymem foam to clean and debride wound    Discussed plan of care with  patient          Assessment:       Large area of epidermal loss with potential full thickness skin loss at area of necrosis 2/2 extravastion         Plan:     Nursing to notify the Provider(s) and re-consult the WOC Nurse if wound(s) deteriorate(s).    Plan of care for wound located on left arem: Change dressing every other day, clean skin around wound with Microklenz, DO NOT clean wound-dressing is designed to treat and clean wound. Cover wound with Polymem foam, ABD, secure with Kerlix and loosely wrapped Coban    WOC Nurse will return: weekly     Face to face time: 20 minutes

## 2017-06-13 NOTE — PROGRESS NOTES
CARDIOLOGY CSI PROGRESS NOTE    SUBJECTIVE:  No acute events overnight. Aware that she is awaiting placement of ICD. Continuing to work w/ PT/OT aggressively and making plans for acute rehabilitation upon discharge. No CP or SOB. Occasional palpitations w/ PVC's, otherwise asymptomatic w/ Afib/Flutter rates 130's. No other symptoms of pre or lexii syncope.  at bedside this am.     ROS otherwise negative.    OBJECTIVE:  Vital signs:  109/56 (Range 100-119/56-74)  59 (HR )  18 (RR 18)  98 (SpO2 93-99%)  157 lbs 1.6 oz (06/11 177 lbs)     I/O: +600/24 hr (+4.8 L/net)  Intake: 1500 cc  Output: 900 cc      PHYSICAL EXAM:  Gen: Generally well appearing, NAD  HEENT: Supple, no lymphadenopathy, no JVD noted  Resp: No signs of resp distress, decreased breath sounds right base  CVS: No JVD, S1+S2 with 3/6 ESM loudest at RUSB  Abdo: Nd, S, NT, +BS  Extremities: Warm, well-perfused, no edema  Neuro: GCS 15/15    Lab Test  06/11/17   1605   HGB  (9.2)   HCT  (28.6*)   WBC  (2.9*)   MCV  98   MCH  31.4   MCHC  32.2   RDW  19.2*   PLT  (31*)   NA  (137)   POTASSIUM  (4.1)   CHLORIDE  (99)   CO2  (32)   BUN  (29)   CR  (0.68)   GLC  200*   VIKTORIYA  7.0*   ALBUMIN   --    BILITOTAL   --    ALKPHOS   --    AST   --    ALT   --        Micro:  Nil new  Imaging:  Nil new     Cardiac meds: Atenolol 50 mg q24h, digoxin 250 micrograms daily  Non-cardiac meds: Calcium carbonate-vit D, folic acid, gabapentin 200 mg TID, insulin, pantoprazole 40 mg q24h, prednisone 15 mg q24h  Drips: nil     ASSESSMENT/PLAN:  Mrs. Amelia Michel is a 56-year old lady with a PMHx of VSD repair (1969), RA, and a recent diagnosis of AFib/AFlutter/SVT who presented to [facility] on 05/29 after OOH cardiac arrest with shockable rhythm. After ROSC in the field, she was transferred to [facility] for further management. Extubated 06/04.    - Hypoxic respiratory failure   - s/p 7-day course of meropenem/ertapenem (05/30-06/04)   - Hold furosemide       - Out-of-hospital  cardiac arrest and cardiogenic shock; Hx of AFib/Aflutter/SVT; sinus arrest   - Continue digoxin 250 micrograms q24h    - Changed to Atenolol 2/2 ongoing N/V w/ Metop/Propanolol    - ICD before discharge - Scheduled for tomorrow. Keep NPO after MN.    - Consider resuming anticoagulation s/p ICD placement       - Severe critical illness myopathy   - OT/PT   - Plan for outpatient rehabilitation      - Severe pancytopenia due to hypoproliferative bone marrow    - s/p stress dose hydrocortisone 05/29-06/04 - now on Prednisone 15 mg daily as recommended by Rheum.   - Platelet goal >20K                 - Rheumatology and Hematology consulted    - Ischemic liver injury complicated by coagulopathy   - Feeding via NGT as well as dysphagia level II diet      Chronic/inactive issues:  - ESBL UTI, aspiration pneumonia: s/p meropenem/ertapenem course  - CELSA: Stable. At baseline Cr 0.7-0.8; peak Cr 1.9  - RA: Prednisone started per Rheum recs; continue to hold immunosuppressants   - Seizure: levetiracetam discontinued 06/05    Seen and staffed with Dr. Samuels.    Harmony Gilbert [facility]-BC  CSI  Pager: [phone]

## 2017-06-13 NOTE — PROGRESS NOTES
Rheumatology Progress Note     6/13/2017      Interval History:   Mrs. Michel feels well on today's visit. Thinks her hand pain and stiffness is improved. Working with PT.     Assessment:  #Seropositive rheumatoid arthritis (anti-CCP positive) since late 1980's with multiple MTP osteotomies, partial right wrist fusion, longstanding therapy consisting of MTX, prednisone and HCQ  #Pancytopenia in setting of out-of-hospital cardiac arrest  #Shock liver, resolved  #Out of hospital cardiac arrest, etiology secondary to medication vs ventricular arrhythmia    Plan:  -decrease prednisone to 10 mg daily, taper to 7.5 mg daily in 2 weeks if tolerating, then 5 mg daily after that if continues to have low disease activity  -follow up with [facility] with me (Conor Cunha) in 4-6 weeks after discharge  -continue to hold HCQ and MTX until follow up    We will sign off. Please call with questions or concerns.    Seen and discussed with Dr. Mcfarland.    Pj Cunha MD  Rheumatology Fellow  [phone]         Review of Systems:   Negative other than noted above         Physical Exam:   Temp:  [97.8  F (36.6  C)-98.2  F (36.8  C)] 97.8  F (36.6  C)  Heart Rate:  [] 72  Resp:  [18] 18  BP: ()/(60-66) 92/63  SpO2:  [95 %-97 %] 95 %    MSK:  -left hand with nontender synovitis MCP 3-4, PIP 2-4  -left wrist with flexion limited to 45 degrees  -right wrist partially fused, limited internal and external rotation  -bony prominence right 2nd MCP  -rest of MSK exam consistent with prior exam, stable post-surgical deformities         Data:     I/O last 3 completed shifts:  In: 1960 [P.O.:960; NG/GT:360]  Out: 2400 [Urine:2400]    Vitals:    06/09/17 0330 06/10/17 0657 06/11/17 0340 06/12/17 0623   Weight: 83 kg (183 lb) 82.3 kg (181 lb 8 oz) 80.5 kg (177 lb 8 oz) 77.3 kg (170 lb 8 oz)    06/13/17 0623   Weight: 71.3 kg (157 lb 1.6 oz)       Results for FIDELIA MICHEL (MRN [id number]) as of 6/13/2017 21:48    Ref. Range 6/13/2017 20:22   Sodium Latest Ref Range: 133 - 144 mmol/L 136   Potassium Latest Ref Range: 3.4 - 5.3 mmol/L 4.6   Chloride Latest Ref Range: 94 - 109 mmol/L 103   Carbon Dioxide Latest Ref Range: 20 - 32 mmol/L 29   Urea Nitrogen Latest Ref Range: 7 - 30 mg/dL 30   Creatinine Latest Ref Range: 0.52 - 1.04 mg/dL 0.68   GFR Estimate Latest Ref Range: >60 mL/min/1.7m2 89   GFR Estimate If Black Latest Ref Range: >60 mL/min/1.7m2 >90...   Calcium Latest Ref Range: 8.5 - 10.1 mg/dL 7.1 (L)   Anion Gap Latest Ref Range: 3 - 14 mmol/L 4   Results for FIDELIA MIDDLETON (MRN [id number]) as of 6/13/2017 21:48   Ref. Range 6/13/2017 20:22   WBC Latest Ref Range: 4.0 - 11.0 10e9/L 2.9 (L)   Hemoglobin Latest Ref Range: 11.7 - 15.7 g/dL 9.4 (L)   Hematocrit Latest Ref Range: 35.0 - 47.0 % 29.1 (L)   Platelet Count Latest Ref Range: 150 - 450 10e9/L 52 (L)   Results for FIDELIA MIDDLETON (MRN [id number]) as of 6/13/2017 21:48   Ref. Range 6/10/2017 08:03   RNP Antibody IgG Latest Ref Range: 0.0 - 0.9 AI <0.2...   Scleroderma Antibody Scl-70 KALYAN IgG Latest Ref Range: 0.0 - 0.9 AI <0.2...   Gatica KALYAN Antibody IgG Latest Ref Range: 0.0 - 0.9 AI <0.2...   SSA (Ro) (KALYAN) Antibody, IgG Latest Ref Range: 0.0 - 0.9 AI <0.2...   SSB (La) (KALYAN) Antibody, IgG Latest Ref Range: 0.0 - 0.9 AI <0.2...   Results for FIDELIA MIDDLETON (MRN [id number]) as of 6/13/2017 21:48   Ref. Range 6/10/2017 08:03   Beta 2 Glycoprotein 1 Antibody IgG Latest Ref Range: <7 U/mL 1.2   Beta 2 Glycoprotein 1 Antibody IgM Latest Ref Range: <7 U/mL <0.9...   Complement C3 Latest Ref Range: 76 - 169 mg/dL 113   Complement C4 Latest Ref Range: 15 - 50 mg/dL 25   DNA-ds Latest Ref Range: <10 IU/mL 3   Results for FIDELIA MIDDLETON (MRN [id number]) as of 6/13/2017 21:48   Ref. Range 4/27/2016 16:56 6/10/2017 08:03   CELESTE by IFA IgG Unknown 1:40... (A)    Cardiolipin Antibody IgG Latest Ref Range: 0.0 - 19.9 GPL-U/mL  <1.6...   Cardiolipin Antibody IgM  Latest Ref Range: 0.0 - 19.9 MPL-U/mL  0.8     Results for FIDELIA MIDDLETON (MRN [id number]) as of 6/13/2017 21:48   Ref. Range 6/10/2017 08:03   Cyclic Citrullinated Peptide Antibody, IgG Latest Ref Range: <7 U/mL 331 (H)   Results for FIDELIA MIDDLETON (MRN [id number]) as of 6/13/2017 21:48   Ref. Range 4/27/2016 16:56 6/10/2017 08:03   Rheumatoid Factor Latest Ref Range: <20 IU/mL 31 (H) <20              I saw the patient with the fellow.  My exam and recomendations are as described.      Garfield Mcfarland MD

## 2017-06-13 NOTE — PROGRESS NOTES
CLINICAL NUTRITION SERVICES - REASSESSMENT NOTE     Nutrition Prescription    RECOMMENDATIONS FOR MDs/PROVIDERS TO ORDER:  1. Rec decrease Peptamen 1.5 TFs to 40 mL/hr x 11 hrs once able to restart TFs. This provides 440 mL TF, 660 kcals, and 30 g protein daily. TFs may be adjusted pending oral intake. Once kcal counts reveal that pt is consuming at least 900 kcals and 45 g protein daily on average, discontinue TFs.   2. Rec provider adjust free water flushes as needed, pending fluid status.    Recommendations already ordered by Registered Dietitian (RD):  Modified oral supplements.  Kcal counts    Future/Additional Recommendations:  1. Diet as per SLP. Rec keep diet as liberalized as able to encourage oral intake. Encourage intake of oral supplements and high kcals/protein options.   2. Continue multivitamin with minerals as ordered to ensure micronutrient needs are met.  3. Follow WOC nurse notes. If pt is assessed to have a stage I-IV pressure injury (PI), rec the following:         - Continue multivitamin with minerals.         - Ten-day wound protocol recommended for stage I PI: Vitamin A (20,000 International Units/day) x 10 days       - Ten-day wound protocol recommended for stage II, III, or IV PI:   Vitamin A (20,000 International Units/day) x 10 days   Vitamin C (500 mg/day) x 10 days   Zinc sulfate (220 mg/day) x 10 days   4. Consider scheduling antiemetics/antinauseants ~20 minutes prior to meals to help optimize nausea control.     5. Discontinue scheduled senna.   6. Continue calcium/vitamin D as ordered. Pt on prednisone.      EVALUATION OF THE PROGRESS TOWARD GOALS   Diet: DD II + nectar-thick liquids. SLP following and rec this diet on 6/12. Previously, she was on a DD I + nectar-thick liquid diet. Ordered to receive Magic Cup with meals.   Intake: Per nursing flowsheets, good diet tolerance. Flowsheets indicate pt consuming 50% of meals with a good appetite 6/8, 25-50% of meals with a poor to  fair appetite on 6/9, 50-75% of meals with a poor to fair appetite 6/10, and 75% of meals with a fair appetite 6/12. Per discussion with pt, diet order and medications have affected her oral intake most. Per RN, nausea (possibly due to propranolol) on 6/11 and needing zofran prn. RN did note that her appetite was a little better 6/11. Pt dislikes the Magic Cup as this is too sweet for her. She prefers sour flavors.     Kcal counts:   6/8     70 kcals and 1 g protein (one meal/s and no supplement/s recorded)   6/9    230 kcals and 15 g protein (meal/s and no supplement/s recorded)   6/10  395 kcals and 19 g protein (meal/s and no supplement/s recorded)   6/11  444 kcals and 12 g protein (meal/s and no supplement/s recorded)   *  Pt consumed a four-day average of 285 kcals and 12 g protein daily. Inadequate oral intake although gradual improvement in kcal intake. Kcal counts do not meet estimated needs of 1200 - 1440 kcals/day (25 - 30 kcals/kg) and 62 - 72+ grams protein/day (1.3 - 1.5+ grams of pro/kg).     Nutrition Support:   - Feeding Tube (FT) access:  NDT with bridle placed on 5/31  - TF regimen: TFs are currently off for possible ICD, but Peptamen 1.5 at goal rate 40 ml/hr (960 ml/day) provides 1440 kcals (30 kcal/kg), 65 g PRO (1.4 g/kg), 739 ml free H2O, 54 g Fat (70% from MCTs), 180 g CHO and no Fiber daily.  - Feeding Tube Flushes:  60 mL Q 2 hrs  - Intake:  Pt received a seven-day TF average of 857 mL/day. This provided 1286 kcals and 58 g protein which meets kcal needs but does not meet protein needs. Less than goal TF received with possible TF interruptions or possibly not all TF charted.     NEW FINDINGS   C nurse note on 6/6:  Large area of epidermal loss with potential full thickness skin loss at area of necrosis 2/2 extravasation     MALNUTRITION  % Intake: Not meeting this criteria  % Weight Loss: Suspect wt increase is due to fluid status changes. Pt was 77.3 kg yesterday (6/12/17) and was 63.5  kg on 5/29/17.  Subcutaneous Fat Loss: None observed  Muscle Loss: None observed  Fluid Accumulation/Edema: Trace  Malnutrition Diagnosis: Patient does not meet two of the above criteria necessary for diagnosing malnutrition    Previous Goals   Total avg nutritional intake to meet a minimum of 25 kcal/kg and 1.3 g PRO/kg daily (per dosing wt 48 kg).  Evaluation: Meeting with TFs + oral intake.    Previous Nutrition Diagnosis  Predicted inadequate nutrient intake (kcal/pro/free water) related to reliance on TF to meet needs as evidenced by potential for TF interruptions to meet < estimated needs, consistent hypernatremia possibly r/t free water deficit    Evaluation: Unresolved. Changed to new nutrition dx below.     CURRENT NUTRITION DIAGNOSIS  Inadequate oral intake r/t decreased appetite with medications, diet order, and intermittent nausea AEB four-day kcal count average of 285 kcals and 12 g protein daily and does not meet estimated needs of 1200 - 1440 kcals/day (25 - 30 kcals/kg) and 62 - 72+ grams protein/day (1.3 - 1.5+ grams of pro/kg).     INTERVENTIONS  Implementation  1. Nutrition education for nutrition relationship to health/disease: Encouraged oral intake once diet order reinstated.  2. Medical food supplement therapy: Provided lime Gelatein samples. If additional are desired, contact RD. Discontinued Magic Cup as per pt preference. Willing to try Beneprotein. Provided an oral supplement menu.   3. Kcal counts 6/14-6/16    Goals  Total average nutrition intake to meet 1200 - 1440 kcals/day (25 - 30 kcals/kg) and 62 - 72+ grams protein/day (1.3 - 1.5+ grams of pro/kg).     Monitoring/Evaluation  Progress toward goals will be monitored and evaluated per protocol.    Inés Brown, MS, RD, LD, [facility]   6C Pgr:  [phone]

## 2017-06-13 NOTE — PROGRESS NOTES
No acute events. NPO after MN for ICD. Afib/Flutter 's. CSI NP aware of Afib RVR. Asymptomatic. Maintaining pressures. WOC RN changed LUE extravasation wound. Ambulated in hallways with PT. Diet advanced to DD2 with thin liquids and tolerating. TF at goal, 40 cc/hr. Huynh adequate UOP. Triggered sepsis protocol (WBC and HR). LA negative. Mag (2.0) replaced per protocol. Continue to monitor.

## 2017-06-14 ENCOUNTER — APPOINTMENT (OUTPATIENT)
Dept: ULTRASOUND IMAGING | Facility: CLINIC | Age: 57
DRG: 260 | End: 2017-06-14
Attending: NURSE PRACTITIONER
Payer: COMMERCIAL

## 2017-06-14 ENCOUNTER — APPOINTMENT (OUTPATIENT)
Dept: PHYSICAL THERAPY | Facility: CLINIC | Age: 57
DRG: 260 | End: 2017-06-14
Attending: NURSE PRACTITIONER
Payer: COMMERCIAL

## 2017-06-14 LAB
ANION GAP SERPL CALCULATED.3IONS-SCNC: 4 MMOL/L (ref 3–14)
ANION GAP SERPL CALCULATED.3IONS-SCNC: 4 MMOL/L (ref 3–14)
BUN SERPL-MCNC: 30 MG/DL (ref 7–30)
BUN SERPL-MCNC: 32 MG/DL (ref 7–30)
CALCIUM SERPL-MCNC: 7.2 MG/DL (ref 8.5–10.1)
CALCIUM SERPL-MCNC: 7.7 MG/DL (ref 8.5–10.1)
CHLORIDE SERPL-SCNC: 101 MMOL/L (ref 94–109)
CHLORIDE SERPL-SCNC: 103 MMOL/L (ref 94–109)
CO2 SERPL-SCNC: 28 MMOL/L (ref 20–32)
CO2 SERPL-SCNC: 32 MMOL/L (ref 20–32)
CREAT SERPL-MCNC: 0.7 MG/DL (ref 0.52–1.04)
CREAT SERPL-MCNC: 0.72 MG/DL (ref 0.52–1.04)
ERYTHROCYTE [DISTWIDTH] IN BLOOD BY AUTOMATED COUNT: 19.4 % (ref 10–15)
ERYTHROCYTE [DISTWIDTH] IN BLOOD BY AUTOMATED COUNT: 19.7 % (ref 10–15)
GFR SERPL CREATININE-BSD FRML MDRD: 84 ML/MIN/1.7M2
GFR SERPL CREATININE-BSD FRML MDRD: 87 ML/MIN/1.7M2
GLUCOSE BLDC GLUCOMTR-MCNC: 119 MG/DL (ref 70–99)
GLUCOSE BLDC GLUCOMTR-MCNC: 192 MG/DL (ref 70–99)
GLUCOSE BLDC GLUCOMTR-MCNC: 218 MG/DL (ref 70–99)
GLUCOSE BLDC GLUCOMTR-MCNC: 90 MG/DL (ref 70–99)
GLUCOSE BLDC GLUCOMTR-MCNC: 96 MG/DL (ref 70–99)
GLUCOSE SERPL-MCNC: 211 MG/DL (ref 70–99)
GLUCOSE SERPL-MCNC: 92 MG/DL (ref 70–99)
HCT VFR BLD AUTO: 27.5 % (ref 35–47)
HCT VFR BLD AUTO: 28.3 % (ref 35–47)
HGB BLD-MCNC: 9.1 G/DL (ref 11.7–15.7)
HGB BLD-MCNC: 9.2 G/DL (ref 11.7–15.7)
INR PPP: 1.18 (ref 0.86–1.14)
INTERPRETATION ECG - MUSE: NORMAL
LA PPP-IMP: NORMAL
LACTATE BLD-SCNC: 2.9 MMOL/L (ref 0.7–2.1)
MAGNESIUM SERPL-MCNC: 2 MG/DL (ref 1.6–2.3)
MCH RBC QN AUTO: 31.4 PG (ref 26.5–33)
MCH RBC QN AUTO: 32.1 PG (ref 26.5–33)
MCHC RBC AUTO-ENTMCNC: 32.2 G/DL (ref 31.5–36.5)
MCHC RBC AUTO-ENTMCNC: 33.5 G/DL (ref 31.5–36.5)
MCV RBC AUTO: 96 FL (ref 78–100)
MCV RBC AUTO: 98 FL (ref 78–100)
PLATELET # BLD AUTO: 44 10E9/L (ref 150–450)
PLATELET # BLD AUTO: 48 10E9/L (ref 150–450)
POTASSIUM SERPL-SCNC: 4.3 MMOL/L (ref 3.4–5.3)
POTASSIUM SERPL-SCNC: 4.7 MMOL/L (ref 3.4–5.3)
RBC # BLD AUTO: 2.87 10E12/L (ref 3.8–5.2)
RBC # BLD AUTO: 2.9 10E12/L (ref 3.8–5.2)
SODIUM SERPL-SCNC: 134 MMOL/L (ref 133–144)
SODIUM SERPL-SCNC: 139 MMOL/L (ref 133–144)
WBC # BLD AUTO: 2.6 10E9/L (ref 4–11)
WBC # BLD AUTO: 2.7 10E9/L (ref 4–11)

## 2017-06-14 PROCEDURE — 25000132 ZZH RX MED GY IP 250 OP 250 PS 637

## 2017-06-14 PROCEDURE — 36415 COLL VENOUS BLD VENIPUNCTURE: CPT | Performed by: INTERNAL MEDICINE

## 2017-06-14 PROCEDURE — 83605 ASSAY OF LACTIC ACID: CPT | Performed by: INTERNAL MEDICINE

## 2017-06-14 PROCEDURE — 40000193 ZZH STATISTIC PT WARD VISIT

## 2017-06-14 PROCEDURE — 25000132 ZZH RX MED GY IP 250 OP 250 PS 637: Performed by: INTERNAL MEDICINE

## 2017-06-14 PROCEDURE — 93005 ELECTROCARDIOGRAM TRACING: CPT

## 2017-06-14 PROCEDURE — 99232 SBSQ HOSP IP/OBS MODERATE 35: CPT | Mod: GC | Performed by: INTERNAL MEDICINE

## 2017-06-14 PROCEDURE — 80048 BASIC METABOLIC PNL TOTAL CA: CPT | Performed by: INTERNAL MEDICINE

## 2017-06-14 PROCEDURE — 97116 GAIT TRAINING THERAPY: CPT | Mod: GP

## 2017-06-14 PROCEDURE — 00000146 ZZHCL STATISTIC GLUCOSE BY METER IP

## 2017-06-14 PROCEDURE — 93971 EXTREMITY STUDY: CPT | Mod: LT

## 2017-06-14 PROCEDURE — 40000275 ZZH STATISTIC RCP TIME EA 10 MIN

## 2017-06-14 PROCEDURE — 25000132 ZZH RX MED GY IP 250 OP 250 PS 637: Performed by: STUDENT IN AN ORGANIZED HEALTH CARE EDUCATION/TRAINING PROGRAM

## 2017-06-14 PROCEDURE — 27210437 ZZH NUTRITION PRODUCT SEMIELEM INTERMED LITER

## 2017-06-14 PROCEDURE — 25000125 ZZHC RX 250: Performed by: INTERNAL MEDICINE

## 2017-06-14 PROCEDURE — 85027 COMPLETE CBC AUTOMATED: CPT | Performed by: INTERNAL MEDICINE

## 2017-06-14 PROCEDURE — 85610 PROTHROMBIN TIME: CPT | Performed by: INTERNAL MEDICINE

## 2017-06-14 PROCEDURE — 21400006 ZZH R&B CCU INTERMEDIATE UMMC

## 2017-06-14 PROCEDURE — 83735 ASSAY OF MAGNESIUM: CPT | Performed by: INTERNAL MEDICINE

## 2017-06-14 RX ORDER — CLINDAMYCIN PHOSPHATE 900 MG/50ML
900 INJECTION, SOLUTION INTRAVENOUS
Status: DISCONTINUED | OUTPATIENT
Start: 2017-06-14 | End: 2017-06-15

## 2017-06-14 RX ORDER — LIDOCAINE 40 MG/G
CREAM TOPICAL
Status: DISCONTINUED | OUTPATIENT
Start: 2017-06-14 | End: 2017-06-16 | Stop reason: HOSPADM

## 2017-06-14 RX ORDER — DIPHENHYDRAMINE HCL 25 MG
25 CAPSULE ORAL ONCE
Status: COMPLETED | OUTPATIENT
Start: 2017-06-14 | End: 2017-06-14

## 2017-06-14 RX ORDER — SODIUM CHLORIDE 9 MG/ML
INJECTION, SOLUTION INTRAVENOUS CONTINUOUS
Status: DISCONTINUED | OUTPATIENT
Start: 2017-06-14 | End: 2017-06-15

## 2017-06-14 RX ADMIN — GABAPENTIN 100 MG: 100 CAPSULE ORAL at 13:22

## 2017-06-14 RX ADMIN — ACETAMINOPHEN 650 MG: 325 TABLET, FILM COATED ORAL at 02:51

## 2017-06-14 RX ADMIN — PREDNISONE 15 MG: 5 TABLET ORAL at 09:24

## 2017-06-14 RX ADMIN — MICONAZOLE NITRATE: 2 POWDER TOPICAL at 16:30

## 2017-06-14 RX ADMIN — MICONAZOLE NITRATE: 2 POWDER TOPICAL at 14:02

## 2017-06-14 RX ADMIN — GABAPENTIN 100 MG: 100 CAPSULE ORAL at 13:51

## 2017-06-14 RX ADMIN — OYSTER SHELL CALCIUM WITH VITAMIN D 2 TABLET: 500; 200 TABLET, FILM COATED ORAL at 16:24

## 2017-06-14 RX ADMIN — DIGOXIN 250 MCG: 125 TABLET ORAL at 09:23

## 2017-06-14 RX ADMIN — FOLIC ACID 1 MG: 1 TABLET ORAL at 16:24

## 2017-06-14 RX ADMIN — PANTOPRAZOLE SODIUM 40 MG: 40 TABLET, DELAYED RELEASE ORAL at 09:23

## 2017-06-14 RX ADMIN — GABAPENTIN 200 MG: 100 CAPSULE ORAL at 20:22

## 2017-06-14 RX ADMIN — INSULIN ASPART 2 UNITS: 100 INJECTION, SOLUTION INTRAVENOUS; SUBCUTANEOUS at 22:37

## 2017-06-14 RX ADMIN — ACETAMINOPHEN 650 MG: 325 TABLET, FILM COATED ORAL at 16:35

## 2017-06-14 RX ADMIN — MULTIPLE VITAMINS W/ MINERALS TAB 1 TABLET: TAB at 16:24

## 2017-06-14 RX ADMIN — ATENOLOL 50 MG: 50 TABLET ORAL at 09:24

## 2017-06-14 RX ADMIN — GABAPENTIN 200 MG: 100 CAPSULE ORAL at 09:24

## 2017-06-14 RX ADMIN — INSULIN ASPART 2 UNITS: 100 INJECTION, SOLUTION INTRAVENOUS; SUBCUTANEOUS at 20:22

## 2017-06-14 RX ADMIN — DIPHENHYDRAMINE HYDROCHLORIDE 25 MG: 25 CAPSULE ORAL at 23:37

## 2017-06-14 RX ADMIN — ACETAMINOPHEN 650 MG: 325 TABLET, FILM COATED ORAL at 13:22

## 2017-06-14 NOTE — PROGRESS NOTES
ICD on hold d/t extravasation and rash. EP attending assessed at bedside. Plan for life vest. DD2 + thin liquids reordered and TF restarted at 1400. LA 2.9 today (HR and WBC). Afib/Alutter . Intermittent RVR. CSI fellow assessed at bedside. No new changes. Rash on chest and back improving; however, bilateral UE pink hives. Pt denies itching or pain. Feels rash was much worse last evening and believes body wash used yesterday could be cause. LUE edema improving. Pt requesting brand to be discontinued. Anticipating discharge to TCU.   *Low UOP this shift. 175 cc. CSI notified. Encouraging fluids and continue to monitor*

## 2017-06-14 NOTE — PLAN OF CARE
Problem: Goal Outcome Summary  Goal: Goal Outcome Summary  Monitor shows atrial fib with rates 60s-80s.  Vss.  She developed a reddened slightly itchy rash on pms across both arms, her chest,back, and upper abdomen, and her face. md notified. No new meds were started yesterday, but patient said she did wash up with the body wash which was new.   Also was noted to have increased swelling of her left arm and hand.  Had 3+ edema in left hand.  Her first 3 fingers of her left hand are slightly numb.  She says the numbness is not new. Platelets up to 52 on pms.   Radial pulse + with dopplar.  Arm raised on pillows above her heart.  md notified of swelling and rash.  Given po benadryl x1 for the rash.  md came to see her, and a left upper extremity ultrasound was done.  Npo at midnight for icd placement today.  Tube feedings off at midnight.  0400 fsg 90.  Huynh with clear anselmo urine.  Groins slightly reddened.  Antifungal powder applied as ordered.  Had loose bm on pms so stool softeners held.  Patient to start jesse counts again today. Repositioned q 3 hours.  Will monitor and notify md of further changes or issues.

## 2017-06-14 NOTE — PLAN OF CARE
Problem: Goal Outcome Summary  Goal: Goal Outcome Summary  OT/EDEMA 6C: Patient tolerating wear of compression wraps well to bilateral lower extremities according to set schedule and with reductions indicated in 8/14 circumferential measurements. 2+ pitting edema still present over dorsum of feet and around knees and compression wraps remain indicated to promote fluid movement. Wraps donned using light compression and may be worn x48 hours, however, please remove if increased pain, numbness/tingling or soiling occurs.

## 2017-06-14 NOTE — PROGRESS NOTES
CARDIOLOGY CSI PROGRESS NOTE    SUBJECTIVE:  Overnight, left arm swelling prompted ultrasound. Also developed erhythematous, pruritic truncal eruption Otherwise unremarkable course. Brief runs of AFib yesterday. Diet advanced to thin liqids on 06/13.     ROS otherwise negative.    OBJECTIVE:  Vital signs:  113/60 (Range /60-70)  81 (HR )  18 (RR 18)  Tm 98.1  06/14 weight 158 lbs (06/13 157 lbs)    I/O: -310  Intake: 2140 intake (1080 PO, 460 enteral, 420 NG)  Output: 2450 urine       PHYSICAL EXAM:  Gen: Looks well, no distress  HEENT: MMM, no signs of resp distress   Resp: No signs of resp distress, CTAB  CVS: No JVD, S1+S2 with 3/6 ESM loudest at RUSB  Abdo: ND, S, NT, +BS  Extremities: Warm, well-perfused, LUE swollen in appearance but appears well-perfused in appearance with good cap refill and radial and ulnar pulses.  Neuro: GCS 15/15  Skin: Confluent erythematous eruption over chest and back  Recent Labs   Lab Test  06/14/17   0554   HGB  9.1* (9.4)   HCT  28.3* (29.1)   WBC  2.7*   MCV  98   MCH  31.4   MCHC  32.2   RDW  19.4*   PLT  44* (41)    NA  139 (136)   POTASSIUM  4.3 (4.6)   CHLORIDE  103 (103)   CO2  32 (29)   BUN  30   CR  0.72 (0.68)    GLC  92   VIKTORIYA  7.7*   ALBUMIN   --    BILITOTAL   --    ALKPHOS   --    AST   --    ALT   --     < > = values in this interval not displayed.       Micro:  Nil new   Imaging:  US Doppler LUE 06/14: No evidence of left upper extremity deep venous thrombosis.    Cardiac meds: Atenolol 50 mg q24h, dixogin 250 micrograms daily, Non-cardiac meds: Folic acid, gabapentin 200 mg TID, insulin, pantoprazole, pre-procedural ABx  Drips:  None    ASSESSMENT/PLAN:  Mrs. Amelia Michel is a 56-year old lady with a PMHx of VSD repair (1969), RA, and a recent diagnosis of AFib/AFlutter/SVT who presented to [facility] on 05/29 after OOH cardiac arrest with shockable rhythm. After ROSC in the field, she was transferred to [facility] for further management. Extubated 06/04.     -  Erythematous eruption   - ?Clindamycin drug reaction   - D/w EP team: to consider vancomycin for ABx PPx     - LUE swelling   - US with Doppler to r/o vascular issues      - Out-of-hospital cardiac arrest and cardiogenic shock; Hx of AFib/Aflutter/SVT; sinus arrest   - Continue digoxin 250 micrograms daily and atenolol 50 mg q24h    - ICD 06/14   - Anticoagulation to be restarted      - Severe critical illness myopathy   - PT/OT ongoing   - Discharge to acute rehab       - Severe pancytopenia due to hypoproliferative bone marrow    - s/p stress dose hydrocortisone 05/29-06/04 - now on prednisone 15 mg daily as recommended by Rheum.   - Platelet goal >20K                   - Ischemic liver injury complicated by coagulopathy   - Feeding via NGT as well as dysphagia level II diet      Chronic/inactive issues:  - ESBL UTI, aspiration pneumonia: s/p meropenem/ertapenem course  - Hypoxic respiratory failure: s/p meropenem/ertapenem  - CELSA: Stable. At baseline Cr 0.7-0.8; peak Cr 1.9  - RA: Prednisone started per Rheum recs; continue to hold immunosuppressants   - Seizure: levetiracetam discontinued 06/05    Staffed with Dr. Samuels.    Gilbert Og  Cardiology Fellow  Pager: [phone]      Dos 6/14/17    Patient examined, chart reviewed and the above reflects our joint assessment and plans.      Lee Samuels MD

## 2017-06-14 NOTE — PROGRESS NOTES
PM&R PROGRESS NOTE     Patient seen and examined today. She/He was observed in therapy session as well. Coordinated with team.      ASSESSMENT   Amelia Michel is a 56 year old female who presents with mild critical illness myopathy and deconditioning.  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  She has significant deconditioning   She has been making giid progress, in fact based on her functional status, she would not be appropriate for ARU setting given that she is ambulating 130 feet with SBA and transfers are SBA, she is able to navigate 6 steps with SBA per PT notes. This makes her not suitable to ARU. ability to tolerate 3 hrs of therapies is not the only qualifying factor to admission to ARU setting. However, her medical complexity for titration of the diuretics given her fluctuating fluid volumes, need to wean her off the tube feeds, while closely monitoring her calorie intake, monitoring of the rash, and the LUE dressing changes would make her appropriate for ARU.   Recommend ARU placement given the medical complexity and need for PT/OT/SLP 60 min daily in each discipline   ELOS is 8-10 days   She   She was independent with basic mobility and basic ADL's prior to admit.   She is agreeable to the plan of care       Thank you for consulting the PM&R Department.   For any questions, please feel free to page me at [phone]   Priscilla Shanks MD   Department of PM&R          HPI/ACTIVE ISSUES   Amelia Michel is a 56 year old female, admitted to [facility] on 5/29/2017. She was seen by PM&R on 6/8/2017 and recommended follow up if she had change in her functional status.   We return to re-evalaute the patient on the request of the ordering physician.     Bree is right handed 56-year old female with a PMHx of VSD repair (1969), RA, on chronic immunosuppression,  and a recent diagnosis of AFib/AFlutter/SVT who  presented to [facility] on 05/29 after OOH cardiac arrest with shockable rhythm.   After ROSC in the field, she was transferred to [facility] for further management on 5/29/2017. Underwent therapeutic hypothermia in CV ICU. Was noted to have epitliform activity on EEG without actual seizures. Placed on Keppra ppx. Seen by Neurology and underwent EEG consistent with diffuse encephalopathy, with Left posterior hemisphere epielptiiform activity continues indicating focal cortical irritability in this region. No overt seizures. She required pressure support.  Extubated 06/04.  Since last seen, she has developed a rash of unknown etiology, she also developed left arm swelling prompted ultrasound.  She is on digoxin 250 micrograms daily and atenolol 50 mg q24h, and ICD 06/14.   She continues to require tube feeds, with NG tube via nostril.     Functionally, Amelia Michel is participating in the therapies in a motivated fashion. She is making good progress.   OT   Patient tolerating wear of compression wraps well to bilateral lower extremities according to set schedule and with reductions indicated in 8/14 circumferential measurements. 2+ pitting edema still present over dorsum of feet and around knees and compression wraps remain indicated to promote fluid movement.     PT   In PT, she ambulates 65ft x2 with FWW and CGA-SBAx1.    STS with CGAx1 and FWW.    Able to use log roll technique to transfer from supine<>sit with min Ax1.   Completed 6 step ups on 6 in aerobic step with FWW and CGAx1    OT has been working with wraps for compression for LE edema.     REVIEW OF THE SYSTEMS   Total of ten systems reviewed, pertinent positives and negatives as follows  Denies chest pain fever chills rigors  Denies any shortness of breath, though some with exertion   Fatigued   Poor appetite   Ongoing tube feeds   Notes a rash   Denies any nausea or vomiting   Denies any lightheadedness or dizziness   Reports generalized weakness but improving   "  Denies any pain   Denies any  cough   Mood euphoric   Slept well last night   Remainder of the review of the systems was negative      Physical exam    Vital signs:  Temp: 97.7  F (36.5  C) Temp src: Oral BP: 98/57 Pulse: 81 Heart Rate: 66 Resp: 20 SpO2: 95 % O2 Device: None (Room air) Oxygen Delivery: 1 LPM Height: 147.3 cm (4' 10\") Weight: 71.4 kg (157 lb 6.4 oz)  Estimated body mass index is 32.9 kg/(m^2) as calculated from the following:    Height as of this encounter: 1.473 m (4' 10\").    Weight as of this encounter: 71.4 kg (157 lb 6.4 oz).  Bree is seated in a chair   She is comfortable   She is alert and oriented   NG tube   Good insight   Processing is functional   HEENT NC AT PRTL EOM good   Neck supple, erythematous rash   LUE in dressings   Abdomen  benign BS positive NT NR   LE bilatreal edema    Muscle strength is symmetrical proximal weakness bilateral except the LUE which is limted by pain            REVIEWED THE MEDICATIONS BELOW   Current Facility-Administered Medications   Medication     lidocaine 1 % 1 mL     lidocaine (LMX4) kit     sodium chloride (PF) 0.9% PF flush 3 mL     sodium chloride (PF) 0.9% PF flush 3 mL     0.9% sodium chloride infusion     clindamycin (CLEOCIN) infusion 900 mg     multivitamin, therapeutic with minerals (THERA-VIT-M) tablet 1 tablet     pantoprazole (PROTONIX) EC tablet 40 mg     miconazole (MICATIN; MICRO GUARD) 2 % powder     atenolol (TENORMIN) tablet 50 mg     ipratropium (ATROVENT) 0.02 % neb solution 0.5 mg     ondansetron (ZOFRAN) injection 4 mg    Or     ondansetron (ZOFRAN-ODT) ODT tab 4 mg     predniSONE (DELTASONE) tablet 15 mg     digoxin (LANOXIN) tablet 250 mcg     gabapentin (NEURONTIN) capsule 200 mg     artificial tears ophthalmic ointment     glucose 40 % gel 15-30 g    Or     dextrose 50 % injection 25-50 mL    Or     glucagon injection 1 mg     insulin aspart (NovoLOG) inj (RAPID ACTING)     potassium chloride SA (K-DUR/KLOR-CON M) CR tablet " 20-40 mEq     potassium chloride (KLOR-CON) Packet 20-40 mEq     potassium chloride 10 mEq in 100 mL intermittent infusion     potassium chloride 10 mEq in 100 mL intermittent infusion with 10 mg lidocaine     potassium chloride 20 mEq in 50 mL intermittent infusion     magnesium sulfate 2 g in NS intermittent infusion (PharMEDium or FV Cmpd)     magnesium sulfate 4 g in 100 mL sterile water (premade)     oxyCODONE (ROXICODONE) IR tablet 5 mg     melatonin tablet 3 mg     HYDROmorphone (DILAUDID) injection 0.2 mg     dextrose 10 % 1,000 mL infusion     sodium chloride (PF) 0.9% PF flush 3 mL     medication instruction     sodium phosphate 25 mmol in D5W intermittent infusion     folic acid (FOLVITE) tablet 1 mg     calcium carb 1250 mg (500 mg [place])/vitamin D 200 units (OSCAL with D) per tablet 2 tablet     lidocaine 1 % 1 mL     senna-docusate (SENOKOT-S;PERICOLACE) 8.6-50 MG per tablet 1-2 tablet     acetaminophen (TYLENOL) Suppository 650 mg     acetaminophen (TYLENOL) tablet 650 mg     alum & mag hydroxide-simethicone (MYLANTA ES/MAALOX  ES) suspension 15-30 mL     lidocaine (LMX4) kit     sodium chloride (PF) 0.9% PF flush 3 mL     naloxone (NARCAN) injection 0.1-0.4 mg     sodium phosphate 10 mmol in D5W intermittent infusion     sodium phosphate 15 mmol in D5W intermittent infusion     sodium phosphate 20 mmol in D5W intermittent infusion     dextrose 10 % 1,000 mL infusion     calcium chloride 1 g in NaCl 0.9 % 100 mL intermittent infusion       Results for orders placed or performed during the hospital encounter of 05/29/17 (from the past 24 hour(s))   Glucose by meter   Result Value Ref Range    Glucose 228 (H) 70 - 99 mg/dL   Glucose by meter   Result Value Ref Range    Glucose 224 (H) 70 - 99 mg/dL   Basic metabolic panel   Result Value Ref Range    Sodium 136 133 - 144 mmol/L    Potassium 4.6 3.4 - 5.3 mmol/L    Chloride 103 94 - 109 mmol/L    Carbon Dioxide 29 20 - 32 mmol/L    Anion Gap 4 3 - 14  mmol/L    Glucose 217 (H) 70 - 99 mg/dL    Urea Nitrogen 30 7 - 30 mg/dL    Creatinine 0.68 0.52 - 1.04 mg/dL    GFR Estimate 89 >60 mL/min/1.7m2    GFR Estimate If Black >90   GFR Calc   >60 mL/min/1.7m2    Calcium 7.1 (L) 8.5 - 10.1 mg/dL   CBC with platelets   Result Value Ref Range    WBC 2.9 (L) 4.0 - 11.0 10e9/L    RBC Count 2.99 (L) 3.8 - 5.2 10e12/L    Hemoglobin 9.4 (L) 11.7 - 15.7 g/dL    Hematocrit 29.1 (L) 35.0 - 47.0 %    MCV 97 78 - 100 fl    MCH 31.4 26.5 - 33.0 pg    MCHC 32.3 31.5 - 36.5 g/dL    RDW 19.4 (H) 10.0 - 15.0 %    Platelet Count 52 (L) 150 - 450 10e9/L   Glucose by meter   Result Value Ref Range    Glucose 206 (H) 70 - 99 mg/dL   Glucose by meter   Result Value Ref Range    Glucose 201 (H) 70 - 99 mg/dL   US Upper Extremity Venous Duplex Left Portable    Narrative    EXAMINATION: DOPPLER VENOUS ULTRASOUND OF THE LEFT UPPER EXTREMITY,  6/14/2017 1:36 AM     COMPARISON: 5/29/2017    HISTORY: Left arm swelling    TECHNIQUE:  Gray-scale evaluation with compression, spectral flow and  color Doppler assessment of the deep venous system of the left upper  extremity.    FINDINGS:  Left: Normal blood flow and waveforms are demonstrated in the internal  jugular, innominate, subclavian, and axillary veins. There is normal  compressibility of the brachial, basilic and cephalic veins. The  radial and ulnar veins are fully compressible.        Impression    IMPRESSION:No evidence of left upper extremity deep venous thrombosis.    I have personally reviewed the examination and initial interpretation  and I agree with the findings.    PROMISE LEMUS MD   Glucose by meter   Result Value Ref Range    Glucose 90 70 - 99 mg/dL   Basic metabolic panel   Result Value Ref Range    Sodium 139 133 - 144 mmol/L    Potassium 4.3 3.4 - 5.3 mmol/L    Chloride 103 94 - 109 mmol/L    Carbon Dioxide 32 20 - 32 mmol/L    Anion Gap 4 3 - 14 mmol/L    Glucose 92 70 - 99 mg/dL    Urea Nitrogen 30 7 - 30  mg/dL    Creatinine 0.72 0.52 - 1.04 mg/dL    GFR Estimate 84 >60 mL/min/1.7m2    GFR Estimate If Black >90   GFR Calc   >60 mL/min/1.7m2    Calcium 7.7 (L) 8.5 - 10.1 mg/dL   CBC with platelets   Result Value Ref Range    WBC 2.7 (L) 4.0 - 11.0 10e9/L    RBC Count 2.90 (L) 3.8 - 5.2 10e12/L    Hemoglobin 9.1 (L) 11.7 - 15.7 g/dL    Hematocrit 28.3 (L) 35.0 - 47.0 %    MCV 98 78 - 100 fl    MCH 31.4 26.5 - 33.0 pg    MCHC 32.2 31.5 - 36.5 g/dL    RDW 19.4 (H) 10.0 - 15.0 %    Platelet Count 44 (LL) 150 - 450 10e9/L   INR   Result Value Ref Range    INR 1.18 (H) 0.86 - 1.14   Magnesium   Result Value Ref Range    Magnesium 2.0 1.6 - 2.3 mg/dL   EKG 12-lead, tracing only   Result Value Ref Range    Interpretation ECG Click View Image link to view waveform and result    Glucose by meter   Result Value Ref Range    Glucose 96 70 - 99 mg/dL   Lactic acid level STAT   Result Value Ref Range    Lactic Acid 2.9 (H) 0.7 - 2.1 mmol/L   Glucose by meter   Result Value Ref Range    Glucose 119 (H) 70 - 99 mg/dL       Total of 65 min spent in the encounter, more than 50% in coordination and counseling on topics listed in the HPI

## 2017-06-14 NOTE — PLAN OF CARE
Problem: Goal Outcome Summary  Goal: Goal Outcome Summary  SLP: Consulted with RN who reports patient tolerating current diet but NPO for procedure today. Will reschedule for 6-.

## 2017-06-14 NOTE — PLAN OF CARE
Problem: Goal Outcome Summary  Goal: Goal Outcome Summary  PT 6C: Continues to participate well in PT.  Ambulates 60ft x2 with FWW and CGA-SBAx1.  Ascends and descends 3 steps with min A and B HH on railing.  PT recommends d/c to ARU when medically stable.

## 2017-06-14 NOTE — PLAN OF CARE
Met with patient, , and sister at the bedside to discuss rehabilitation options. Patient reports that she was unsure of her abilities to participate in intensive therapies, but states that a discussion with PT convinced her that she would be able to tolerate the intensity, and that she wishes to be evaluated for ARC. Patient, spouse, and sister asked appropriate questions, specifically regarding insurance coverage for rehabilitation. Liaison described benefit verification and prior authorization process.     Patient's insurance will require prior authorization for ARC and TCU services.     Thank you for the referral, we will continue to follow this patient for post acute placement.     Determination of admission is based upon the patient's need for an intensive, interdisciplinary approach to rehabilitation, their ability to progress, their ability to tolerate intensive therapies, their need for daily physician supervision, their need for twenty four hour nursing assistance, and their ability and willingness to participate in such a program.    Thai Jordan RN  Rehab Liaison/  [facility]  6/14/2017    10:50 AM

## 2017-06-15 ENCOUNTER — APPOINTMENT (OUTPATIENT)
Dept: OCCUPATIONAL THERAPY | Facility: CLINIC | Age: 57
DRG: 260 | End: 2017-06-15
Attending: NURSE PRACTITIONER
Payer: COMMERCIAL

## 2017-06-15 ENCOUNTER — APPOINTMENT (OUTPATIENT)
Dept: SPEECH THERAPY | Facility: CLINIC | Age: 57
DRG: 260 | End: 2017-06-15
Attending: NURSE PRACTITIONER
Payer: COMMERCIAL

## 2017-06-15 ENCOUNTER — APPOINTMENT (OUTPATIENT)
Dept: PHYSICAL THERAPY | Facility: CLINIC | Age: 57
DRG: 260 | End: 2017-06-15
Attending: NURSE PRACTITIONER
Payer: COMMERCIAL

## 2017-06-15 LAB
ALBUMIN SERPL-MCNC: 1.8 G/DL (ref 3.4–5)
ALP SERPL-CCNC: 168 U/L (ref 40–150)
ALT SERPL W P-5'-P-CCNC: 73 U/L (ref 0–50)
ANION GAP SERPL CALCULATED.3IONS-SCNC: 7 MMOL/L (ref 3–14)
ANION GAP SERPL CALCULATED.3IONS-SCNC: 7 MMOL/L (ref 3–14)
AST SERPL W P-5'-P-CCNC: 31 U/L (ref 0–45)
BASOPHILS # BLD AUTO: 0 10E9/L (ref 0–0.2)
BASOPHILS NFR BLD AUTO: 0.8 %
BILIRUB DIRECT SERPL-MCNC: 0.3 MG/DL (ref 0–0.2)
BILIRUB SERPL-MCNC: 0.8 MG/DL (ref 0.2–1.3)
BUN SERPL-MCNC: 30 MG/DL (ref 7–30)
BUN SERPL-MCNC: 30 MG/DL (ref 7–30)
CALCIUM SERPL-MCNC: 7.3 MG/DL (ref 8.5–10.1)
CALCIUM SERPL-MCNC: 7.6 MG/DL (ref 8.5–10.1)
CHLORIDE SERPL-SCNC: 102 MMOL/L (ref 94–109)
CHLORIDE SERPL-SCNC: 102 MMOL/L (ref 94–109)
CO2 SERPL-SCNC: 26 MMOL/L (ref 20–32)
CO2 SERPL-SCNC: 28 MMOL/L (ref 20–32)
CREAT SERPL-MCNC: 0.69 MG/DL (ref 0.52–1.04)
CREAT SERPL-MCNC: 0.71 MG/DL (ref 0.52–1.04)
DIFFERENTIAL METHOD BLD: ABNORMAL
EOSINOPHIL # BLD AUTO: 0.1 10E9/L (ref 0–0.7)
EOSINOPHIL NFR BLD AUTO: 5.4 %
ERYTHROCYTE [DISTWIDTH] IN BLOOD BY AUTOMATED COUNT: 19.7 % (ref 10–15)
ERYTHROCYTE [DISTWIDTH] IN BLOOD BY AUTOMATED COUNT: 19.9 % (ref 10–15)
GFR SERPL CREATININE-BSD FRML MDRD: 85 ML/MIN/1.7M2
GFR SERPL CREATININE-BSD FRML MDRD: 87 ML/MIN/1.7M2
GLUCOSE BLDC GLUCOMTR-MCNC: 110 MG/DL (ref 70–99)
GLUCOSE BLDC GLUCOMTR-MCNC: 142 MG/DL (ref 70–99)
GLUCOSE BLDC GLUCOMTR-MCNC: 164 MG/DL (ref 70–99)
GLUCOSE BLDC GLUCOMTR-MCNC: 177 MG/DL (ref 70–99)
GLUCOSE BLDC GLUCOMTR-MCNC: 207 MG/DL (ref 70–99)
GLUCOSE BLDC GLUCOMTR-MCNC: 220 MG/DL (ref 70–99)
GLUCOSE BLDC GLUCOMTR-MCNC: 82 MG/DL (ref 70–99)
GLUCOSE SERPL-MCNC: 105 MG/DL (ref 70–99)
GLUCOSE SERPL-MCNC: 190 MG/DL (ref 70–99)
HCT VFR BLD AUTO: 26.5 % (ref 35–47)
HCT VFR BLD AUTO: 29.8 % (ref 35–47)
HGB BLD-MCNC: 9 G/DL (ref 11.7–15.7)
HGB BLD-MCNC: 9.6 G/DL (ref 11.7–15.7)
IMM GRANULOCYTES # BLD: 0 10E9/L (ref 0–0.4)
IMM GRANULOCYTES NFR BLD: 0.4 %
INR PPP: 1.21 (ref 0.86–1.14)
LYMPHOCYTES # BLD AUTO: 0.5 10E9/L (ref 0.8–5.3)
LYMPHOCYTES NFR BLD AUTO: 20.5 %
MAGNESIUM SERPL-MCNC: 2.1 MG/DL (ref 1.6–2.3)
MCH RBC QN AUTO: 31.6 PG (ref 26.5–33)
MCH RBC QN AUTO: 32.7 PG (ref 26.5–33)
MCHC RBC AUTO-ENTMCNC: 32.2 G/DL (ref 31.5–36.5)
MCHC RBC AUTO-ENTMCNC: 34 G/DL (ref 31.5–36.5)
MCV RBC AUTO: 96 FL (ref 78–100)
MCV RBC AUTO: 98 FL (ref 78–100)
MONOCYTES # BLD AUTO: 0.3 10E9/L (ref 0–1.3)
MONOCYTES NFR BLD AUTO: 11.2 %
NEUTROPHILS # BLD AUTO: 1.6 10E9/L (ref 1.6–8.3)
NEUTROPHILS NFR BLD AUTO: 61.7 %
NRBC # BLD AUTO: 0 10*3/UL
NRBC BLD AUTO-RTO: 0 /100
PLATELET # BLD AUTO: 52 10E9/L (ref 150–450)
PLATELET # BLD AUTO: 52 10E9/L (ref 150–450)
POTASSIUM SERPL-SCNC: 4.4 MMOL/L (ref 3.4–5.3)
POTASSIUM SERPL-SCNC: 4.6 MMOL/L (ref 3.4–5.3)
PROT SERPL-MCNC: 4.9 G/DL (ref 6.8–8.8)
RBC # BLD AUTO: 2.75 10E12/L (ref 3.8–5.2)
RBC # BLD AUTO: 3.04 10E12/L (ref 3.8–5.2)
SODIUM SERPL-SCNC: 134 MMOL/L (ref 133–144)
SODIUM SERPL-SCNC: 136 MMOL/L (ref 133–144)
WBC # BLD AUTO: 2.3 10E9/L (ref 4–11)
WBC # BLD AUTO: 2.7 10E9/L (ref 4–11)

## 2017-06-15 PROCEDURE — 25000125 ZZHC RX 250: Performed by: STUDENT IN AN ORGANIZED HEALTH CARE EDUCATION/TRAINING PROGRAM

## 2017-06-15 PROCEDURE — 97110 THERAPEUTIC EXERCISES: CPT | Mod: GP | Performed by: PHYSICAL THERAPIST

## 2017-06-15 PROCEDURE — 36415 COLL VENOUS BLD VENIPUNCTURE: CPT | Performed by: INTERNAL MEDICINE

## 2017-06-15 PROCEDURE — 88305 TISSUE EXAM BY PATHOLOGIST: CPT | Performed by: INTERNAL MEDICINE

## 2017-06-15 PROCEDURE — 25000125 ZZHC RX 250: Performed by: DERMATOLOGY

## 2017-06-15 PROCEDURE — 21400006 ZZH R&B CCU INTERMEDIATE UMMC

## 2017-06-15 PROCEDURE — 25000132 ZZH RX MED GY IP 250 OP 250 PS 637: Performed by: INTERNAL MEDICINE

## 2017-06-15 PROCEDURE — 00000146 ZZHCL STATISTIC GLUCOSE BY METER IP

## 2017-06-15 PROCEDURE — 40000193 ZZH STATISTIC PT WARD VISIT: Performed by: PHYSICAL THERAPIST

## 2017-06-15 PROCEDURE — 40000133 ZZH STATISTIC OT WARD VISIT

## 2017-06-15 PROCEDURE — 97535 SELF CARE MNGMENT TRAINING: CPT | Mod: GO

## 2017-06-15 PROCEDURE — 85610 PROTHROMBIN TIME: CPT | Performed by: INTERNAL MEDICINE

## 2017-06-15 PROCEDURE — 40000225 ZZH STATISTIC SLP WARD VISIT

## 2017-06-15 PROCEDURE — 25000132 ZZH RX MED GY IP 250 OP 250 PS 637

## 2017-06-15 PROCEDURE — 85027 COMPLETE CBC AUTOMATED: CPT | Performed by: INTERNAL MEDICINE

## 2017-06-15 PROCEDURE — 80076 HEPATIC FUNCTION PANEL: CPT | Performed by: INTERNAL MEDICINE

## 2017-06-15 PROCEDURE — 25000132 ZZH RX MED GY IP 250 OP 250 PS 637: Performed by: DERMATOLOGY

## 2017-06-15 PROCEDURE — 80048 BASIC METABOLIC PNL TOTAL CA: CPT | Performed by: INTERNAL MEDICINE

## 2017-06-15 PROCEDURE — 92526 ORAL FUNCTION THERAPY: CPT | Mod: GN

## 2017-06-15 PROCEDURE — 85004 AUTOMATED DIFF WBC COUNT: CPT | Performed by: INTERNAL MEDICINE

## 2017-06-15 PROCEDURE — 83735 ASSAY OF MAGNESIUM: CPT | Performed by: INTERNAL MEDICINE

## 2017-06-15 PROCEDURE — 25000132 ZZH RX MED GY IP 250 OP 250 PS 637: Performed by: STUDENT IN AN ORGANIZED HEALTH CARE EDUCATION/TRAINING PROGRAM

## 2017-06-15 PROCEDURE — 97116 GAIT TRAINING THERAPY: CPT | Mod: GP | Performed by: PHYSICAL THERAPIST

## 2017-06-15 PROCEDURE — 27210437 ZZH NUTRITION PRODUCT SEMIELEM INTERMED LITER

## 2017-06-15 PROCEDURE — 97140 MANUAL THERAPY 1/> REGIONS: CPT | Mod: GO

## 2017-06-15 RX ORDER — PREDNISONE 10 MG/1
10 TABLET ORAL DAILY
Status: DISCONTINUED | OUTPATIENT
Start: 2017-06-16 | End: 2017-06-15

## 2017-06-15 RX ORDER — PREDNISONE 10 MG/1
10 TABLET ORAL DAILY
Status: DISCONTINUED | OUTPATIENT
Start: 2017-06-15 | End: 2017-06-16 | Stop reason: HOSPADM

## 2017-06-15 RX ORDER — TRIAMCINOLONE ACETONIDE 1 MG/G
OINTMENT TOPICAL 2 TIMES DAILY
Status: DISCONTINUED | OUTPATIENT
Start: 2017-06-15 | End: 2017-06-16 | Stop reason: HOSPADM

## 2017-06-15 RX ORDER — LIDOCAINE HYDROCHLORIDE AND EPINEPHRINE 10; 10 MG/ML; UG/ML
10 INJECTION, SOLUTION INFILTRATION; PERINEURAL ONCE
Status: COMPLETED | OUTPATIENT
Start: 2017-06-15 | End: 2017-06-15

## 2017-06-15 RX ADMIN — INSULIN ASPART 1 UNITS: 100 INJECTION, SOLUTION INTRAVENOUS; SUBCUTANEOUS at 02:06

## 2017-06-15 RX ADMIN — GABAPENTIN 200 MG: 100 CAPSULE ORAL at 08:54

## 2017-06-15 RX ADMIN — PREDNISONE 10 MG: 10 TABLET ORAL at 09:10

## 2017-06-15 RX ADMIN — OYSTER SHELL CALCIUM WITH VITAMIN D 2 TABLET: 500; 200 TABLET, FILM COATED ORAL at 11:40

## 2017-06-15 RX ADMIN — INSULIN ASPART 2 UNITS: 100 INJECTION, SOLUTION INTRAVENOUS; SUBCUTANEOUS at 15:28

## 2017-06-15 RX ADMIN — LIDOCAINE HYDROCHLORIDE,EPINEPHRINE BITARTRATE 10 ML: 10; .01 INJECTION, SOLUTION INFILTRATION; PERINEURAL at 15:24

## 2017-06-15 RX ADMIN — GABAPENTIN 200 MG: 100 CAPSULE ORAL at 20:04

## 2017-06-15 RX ADMIN — APIXABAN 2.5 MG: 2.5 TABLET, FILM COATED ORAL at 20:11

## 2017-06-15 RX ADMIN — APIXABAN 2.5 MG: 2.5 TABLET, FILM COATED ORAL at 13:05

## 2017-06-15 RX ADMIN — MICONAZOLE NITRATE: 2 POWDER TOPICAL at 08:54

## 2017-06-15 RX ADMIN — INSULIN ASPART 1 UNITS: 100 INJECTION, SOLUTION INTRAVENOUS; SUBCUTANEOUS at 22:49

## 2017-06-15 RX ADMIN — MICONAZOLE NITRATE: 2 POWDER TOPICAL at 20:05

## 2017-06-15 RX ADMIN — GABAPENTIN 200 MG: 100 CAPSULE ORAL at 13:05

## 2017-06-15 RX ADMIN — TRIAMCINOLONE ACETONIDE: 1 OINTMENT TOPICAL at 18:34

## 2017-06-15 RX ADMIN — INSULIN ASPART 2 UNITS: 100 INJECTION, SOLUTION INTRAVENOUS; SUBCUTANEOUS at 18:35

## 2017-06-15 RX ADMIN — ACETAMINOPHEN 650 MG: 325 TABLET, FILM COATED ORAL at 18:42

## 2017-06-15 RX ADMIN — ATENOLOL 50 MG: 50 TABLET ORAL at 08:54

## 2017-06-15 RX ADMIN — MULTIPLE VITAMINS W/ MINERALS TAB 1 TABLET: TAB at 08:54

## 2017-06-15 RX ADMIN — PANTOPRAZOLE SODIUM 40 MG: 40 TABLET, DELAYED RELEASE ORAL at 08:53

## 2017-06-15 RX ADMIN — FOLIC ACID 1 MG: 1 TABLET ORAL at 08:54

## 2017-06-15 RX ADMIN — DIGOXIN 250 MCG: 125 TABLET ORAL at 08:52

## 2017-06-15 ASSESSMENT — VISUAL ACUITY: OU: BASELINE

## 2017-06-15 NOTE — PLAN OF CARE
Problem: Goal Outcome Summary  Goal: Goal Outcome Summary  PT 6C: Pt completes bed mobility and sit<>stand transfers with CGA and fww. Progressed ambulation to 110 ft x2 with fww and CGA. Mild instability with ambulation, but pt able to self correct. Fatigues quickly with mobility, seated rest breaks taken throughout session. SpO2 96% on RA. HR elevates to 129 bpm with activity.      Recommend discharge to ARU as pt is below baseline functional mobility, previously IND, and very motivated.

## 2017-06-15 NOTE — PROGRESS NOTES
Brief Cards Swing Cross Cover Note:    Notified: Acute onset Itchy erythematous rash (same as rash from last night)    Exam: Appears like a sunburn, erythematous extending from chest to mid back, pruritic. No skin sloughing or ulcerations.     Assessment:   Unclear etiology given lack of recent medication changes.   Pt does have allergies to penicillin / tape, but distribution of rash is not suggestive of contact dermatitis. No overt exposure to penicillins recently.   -Pt had good relief with PO Benadryl last night.     Plan: Gave one time Benadryl 25mg PO  -Primary team to consider Pharmacy consult to review potential interactions / medication offenders   -Also can consider Derm consult if worsens

## 2017-06-15 NOTE — PLAN OF CARE
"Problem: Goal Outcome Summary  Goal: Goal Outcome Summary  Outcome: No Change  /60 (BP Location: Right arm)  Pulse 68  Temp 97.9  F (36.6  C) (Oral)  Resp 18  Ht 1.473 m (4' 10\")  Wt 71.4 kg (157 lb 6.4 oz)  SpO2 98%  BMI 32.9 kg/m2  Aflutter/Afib OVSS. A&Ox4. Denies Pain. C/O numbness in 1st 3 fingers on Left hand. TF stopped at 0430AM (MD to order diet this morning). Lino patent. Up A1. R PIV TKO. Rash unchanged - benadryl given for itching. Will not have ICD (per report) with current left arm extravasation wound. Has Life Vest information on bedside table. Patient's understanding is that she will be dc'd to TCU with lifevest. BG Q4hrs stable overnight. Continue POC.      "

## 2017-06-15 NOTE — CONSULTS
[facility] Inpatient Consult Dermatology Note    Impression/Plan:  This is a 56 year old female with PMH of RA, VSD s/p repair, afib/aflutter, HTN, insomnia who presents with cardiac arrest with ROSC who developed new morbilliform rash 2 days ago. It appears that the only medication that was started within the last 5-14 days since onset of rash was digoxin. Given morbilliform eruption usually drug versus virus, consider drawing HHV6, CMV, and EBV  - punch biopsy performed today   - start triamcinolone 0.1% ointment BID to trunk and extremities for pruritus  - follow cbc with diff to look for eosinophilia and daily LFTs until discharge    Punch Biopsy Procedure Note:  The risks and benefits of the procedure were described to the patient. These include but are not limited to bleeding, infection, scar, incomplete removal, and non-diagnostic biopsy. Written informed consent was obtained. The right proximal arm was cleansed with an alcohol pad and injected with lidocaine with epinephrine (<1mL used). Once anesthesia was obtained, a 4 mm punch biopsy was performed. The tissue was placed in a labeled container with formalin and sent to pathology. The site was closed using two simple interrupted 4-0 Prolene sutures. Vaseline and a bandage were applied to the wound. The patient tolerated the procedure well and was given post biopsy care instructions.    Thank you for the dermatology consultation. Please do not hesitate to contact the dermatology resident/faculty on call for any additional questions or concerns. We will continue to follow.     Arabella Joy MD  Dermatology Resident    I have seen and examined this patient and agree with the assessment and plan as documented in the resident's note, and was present for all procedures.    Selvin Shelley MD  Dermatology Attending        Dermatology Problem List:  1. Suspected drug eruption, biopsy on 6/15/17     Date of Admission: May 29, 2017   Encounter  Date: 6/15/2017     Reason for Consultation:   Rash on trunk     History of Present Illness:  Ms. Amelia Michel is a 56 year old female with PMH of RA, VSD s/p repair, afib/aflutter, HTN, insomnia who presented with cardiac arrest with ROSC who developed a pruritic pink macular eruption that started on the trunk and spread to the proximal upper and lower extremities. She states that it is quite itchy but she is not sure if it still spreading. She otherwise states she has been feeling better, and the plan is for her to go to rehab tomorrow. Denies any fevers, chills, flu like or cold like symptoms.    Past Medical History:   Patient Active Problem List   Diagnosis     HTN (hypertension)     Primary pulmonary hypertension (H)     Hyperlipidemia LDL goal <130     Other rheumatoid arthritis with rheumatoid factor of multiple sites (H)     Lymphedema of both lower extremities     Atrial tachycardia (H)     Cardiogenic shock (H)     Acute respiratory failure with hypoxia (H)     Hypertension     Past Medical History:   Diagnosis Date     Hypertension      Rheumatoid arthritis(714.0)      Past Surgical History:   Procedure Laterality Date     ANESTHESIA CARDIOVERSION N/A 5/17/2017    Procedure: ANESTHESIA CARDIOVERSION;  ANESTHESIA CARDIOVERSION;  Surgeon: GENERIC ANESTHESIA PROVIDER;  Location: [facility] OR     ARTHRODESIS WRIST  2000    Right wrist     FOOT SURGERY      4 left and 2 right     RELEASE CARPAL TUNNEL BILATERAL       REPAIR VENTRICULAR SEPTAL DEFECT  1969       Social History:  . Denies smoking. No children    Family History:  Hx of CVA in father    Medications:  Current Facility-Administered Medications   Medication     predniSONE (DELTASONE) tablet 10 mg     apixaban ANTICOAGULANT (ELIQUIS) tablet 2.5 mg     lidocaine 1 % 1 mL     lidocaine (LMX4) kit     sodium chloride (PF) 0.9% PF flush 3 mL     sodium chloride (PF) 0.9% PF flush 3 mL     multivitamin, therapeutic with minerals (THERA-VIT-M) tablet 1  tablet     pantoprazole (PROTONIX) EC tablet 40 mg     miconazole (MICATIN; MICRO GUARD) 2 % powder     atenolol (TENORMIN) tablet 50 mg     ipratropium (ATROVENT) 0.02 % neb solution 0.5 mg     ondansetron (ZOFRAN) injection 4 mg    Or     ondansetron (ZOFRAN-ODT) ODT tab 4 mg     digoxin (LANOXIN) tablet 250 mcg     gabapentin (NEURONTIN) capsule 200 mg     artificial tears ophthalmic ointment     glucose 40 % gel 15-30 g    Or     dextrose 50 % injection 25-50 mL    Or     glucagon injection 1 mg     insulin aspart (NovoLOG) inj (RAPID ACTING)     potassium chloride SA (K-DUR/KLOR-CON M) CR tablet 20-40 mEq     potassium chloride (KLOR-CON) Packet 20-40 mEq     potassium chloride 10 mEq in 100 mL intermittent infusion     potassium chloride 10 mEq in 100 mL intermittent infusion with 10 mg lidocaine     potassium chloride 20 mEq in 50 mL intermittent infusion     magnesium sulfate 2 g in NS intermittent infusion ([facility] or [facility] Cmpd)     magnesium sulfate 4 g in 100 mL sterile water (premade)     oxyCODONE (ROXICODONE) IR tablet 5 mg     melatonin tablet 3 mg     HYDROmorphone (DILAUDID) injection 0.2 mg     dextrose 10 % 1,000 mL infusion     medication instruction     sodium phosphate 25 mmol in D5W intermittent infusion     folic acid (FOLVITE) tablet 1 mg     calcium carb 1250 mg (500 mg [place])/vitamin D 200 units (OSCAL with D) per tablet 2 tablet     lidocaine 1 % 1 mL     senna-docusate (SENOKOT-S;PERICOLACE) 8.6-50 MG per tablet 1-2 tablet     acetaminophen (TYLENOL) Suppository 650 mg     acetaminophen (TYLENOL) tablet 650 mg     alum & mag hydroxide-simethicone (MYLANTA ES/MAALOX  ES) suspension 15-30 mL     lidocaine (LMX4) kit     naloxone (NARCAN) injection 0.1-0.4 mg     sodium phosphate 10 mmol in D5W intermittent infusion     sodium phosphate 15 mmol in D5W intermittent infusion     sodium phosphate 20 mmol in D5W intermittent infusion     dextrose 10 % 1,000 mL infusion     calcium chloride 1 g  "in NaCl 0.9 % 100 mL intermittent infusion          Allergies   Allergen Reactions     Penicillins      Tape [Adhesive Tape] Rash         Review of Systems:  -Skin/Heme New Pt: The patient denies frequent sun exposure. The patient denies excessive scarring or problems healing except as per HPI. The patient denies excessive bleeding.      Physical exam:  Vitals: BP 92/64 (BP Location: Right arm)  Pulse 141  Temp 98.5  F (36.9  C) (Oral)  Resp 16  Ht 1.473 m (4' 10\")  Wt 71.4 kg (157 lb 6.4 oz)  SpO2 95%  BMI 32.9 kg/m2  GEN: This is a well developed, well-nourished female in no acute distress, in a pleasant mood.    SKIN: Total skin excluding the undergarment areas was performed. The exam included the head/face, neck, both arms, chest, back, abdomen, both legs, digits and/or nails.   - there are multiple pink macules over the proximal upper and lower extremities   - there is black hemorrhagic crust over mid chest from resuscitation  - there are pink macules that coalesce into a large patch on the upper back, upper chest, and abdomen  - no oral ulcers, no facial swelling, no conjunctival injection    Laboratory:  Results for orders placed or performed during the hospital encounter of 05/29/17 (from the past 24 hour(s))   Glucose by meter   Result Value Ref Range    Glucose 119 (H) 70 - 99 mg/dL   Basic metabolic panel   Result Value Ref Range    Sodium 134 133 - 144 mmol/L    Potassium 4.7 3.4 - 5.3 mmol/L    Chloride 101 94 - 109 mmol/L    Carbon Dioxide 28 20 - 32 mmol/L    Anion Gap 4 3 - 14 mmol/L    Glucose 211 (H) 70 - 99 mg/dL    Urea Nitrogen 32 (H) 7 - 30 mg/dL    Creatinine 0.70 0.52 - 1.04 mg/dL    GFR Estimate 87 >60 mL/min/1.7m2    GFR Estimate If Black >90   GFR Calc   >60 mL/min/1.7m2    Calcium 7.2 (L) 8.5 - 10.1 mg/dL   CBC with platelets   Result Value Ref Range    WBC 2.6 (L) 4.0 - 11.0 10e9/L    RBC Count 2.87 (L) 3.8 - 5.2 10e12/L    Hemoglobin 9.2 (L) 11.7 - 15.7 g/dL    " Hematocrit 27.5 (L) 35.0 - 47.0 %    MCV 96 78 - 100 fl    MCH 32.1 26.5 - 33.0 pg    MCHC 33.5 31.5 - 36.5 g/dL    RDW 19.7 (H) 10.0 - 15.0 %    Platelet Count 48 (LL) 150 - 450 10e9/L   Glucose by meter   Result Value Ref Range    Glucose 218 (H) 70 - 99 mg/dL   Glucose by meter   Result Value Ref Range    Glucose 192 (H) 70 - 99 mg/dL   Glucose by meter   Result Value Ref Range    Glucose 164 (H) 70 - 99 mg/dL   Basic metabolic panel   Result Value Ref Range    Sodium 136 133 - 144 mmol/L    Potassium 4.6 3.4 - 5.3 mmol/L    Chloride 102 94 - 109 mmol/L    Carbon Dioxide 28 20 - 32 mmol/L    Anion Gap 7 3 - 14 mmol/L    Glucose 105 (H) 70 - 99 mg/dL    Urea Nitrogen 30 7 - 30 mg/dL    Creatinine 0.71 0.52 - 1.04 mg/dL    GFR Estimate 85 >60 mL/min/1.7m2    GFR Estimate If Black >90   GFR Calc   >60 mL/min/1.7m2    Calcium 7.6 (L) 8.5 - 10.1 mg/dL   CBC with platelets   Result Value Ref Range    WBC 2.3 (L) 4.0 - 11.0 10e9/L    RBC Count 3.04 (L) 3.8 - 5.2 10e12/L    Hemoglobin 9.6 (L) 11.7 - 15.7 g/dL    Hematocrit 29.8 (L) 35.0 - 47.0 %    MCV 98 78 - 100 fl    MCH 31.6 26.5 - 33.0 pg    MCHC 32.2 31.5 - 36.5 g/dL    RDW 19.7 (H) 10.0 - 15.0 %    Platelet Count 52 (L) 150 - 450 10e9/L   INR   Result Value Ref Range    INR 1.21 (H) 0.86 - 1.14   Magnesium   Result Value Ref Range    Magnesium 2.1 1.6 - 2.3 mg/dL   Glucose by meter   Result Value Ref Range    Glucose 82 70 - 99 mg/dL   Glucose by meter   Result Value Ref Range    Glucose 110 (H) 70 - 99 mg/dL   Glucose by meter   Result Value Ref Range    Glucose 177 (H) 70 - 99 mg/dL       Dr. Shelley  staffed the patient.    Staff Involved:  Resident([facility])/Staff(as above)

## 2017-06-15 NOTE — PROGRESS NOTES
Care Coordinator Progress Note     Admission Date/Time:  5/29/2017  Attending MD:  Sundar Wood MD  Data  Chart reviewed, discussed with interdisciplinary team.   Patient was admitted for:    Cardiac arrest (H)  Cardiac arrest with ventricular fibrillation (H)  Cardiogenic shock (H).  Assessment  Per NP, pt will need to wait 2 weeks for ICD placement and will need to discharge from the hospital with a life vest. This writer met with pt and gave informational pamphlet about life vest, pt is in agreement with this plan. This writer notified Chelo Valente, [employer]  (P: [phone], F: [phone]) to notify that   PM & R seeing patient this afternoon for placement recommendation, PT currently recommending ARU.   Pt currently receiving cardiac telemetry monitoring and tube feedings inpatient.  Intervention  6/15: Script and clinical information sent to [employer] (P:[phone], F: [phone]) for home life vest. Insurance approval was received and plan is for fitting of life vest tonight at 6pm, pt's RN updated.  Plan  Anticipated Discharge Date: 6/16/2017  Anticipated Discharge Plan: Discharge to ARU.  CC will continue to monitor patient's medical condition and progress towards discharge.  Rose Mayes RN BSN  6C Unit Care Coordinator  Phone number: [phone]  Pager: [phone]

## 2017-06-15 NOTE — PLAN OF CARE
Problem: Goal Outcome Summary  Goal: Goal Outcome Summary     SLP 6C: Pt seen for dysphagia. Recommend advance to dysphagia diet level 3 and thin liquids. Straws ok. Pt should be upright, alert, small bites/sips consumed and alternate consistencies. SLP to follow.

## 2017-06-15 NOTE — PLAN OF CARE
-Pt up to chair in her room on three occassions.  -Pt worked with therapies twice.  -Pt had her edemetis legs rewrapped.  -Pt had a biopsy taken by dermatology.  -Pt had her extravasation wound redressed by nursing.   -Pt seen by primary physicians group.  -Pt up for three meals today and ate about half of each.

## 2017-06-15 NOTE — PLAN OF CARE
Problem: Cardiac Rhythm Management Device (Adult)  Goal: Signs and Symptoms of Listed Potential Problems Will be Absent or Manageable (Cardiac Rhythm Management Device)  Signs and symptoms of listed potential problems will be absent or manageable by discharge/transition of care (reference Cardiac Rhythm Management Device (Adult) CPG).   Outcome: No Change  D/I: Monitor shows A-fib/slutter 90-100s. Continues on TF of peptamin 1.5 at 40 ml/hour. Taking soft, dysphasia 2 diet. Taking pills with thin liquids without difficulty. C/O left hand tingling, middle, 1st fingers and thumb, slightly relieved with tylenol. Left forearm wound dressing changed. Elevated on pillow. Will not have ICD (per report) with current left arm extravasation wound. Has Life Vest information on bedside table. Patient's understanding is that she will be dc'd to TCU with lifevest. Up to chair and BSC with assist of 1 and walker. At end of shift, C/O itching on left shoulder. Noted to have redness on chest, upper back, and arms down to mid arm, states similar to last night. MD notified and will order benadryl. See flowsheets for assessments and additional data.  A: Stable rhythm.   P: Continue TF as ordered. Encourage PO intake as able. Monitor left arm wound for signs of infection. Encourage increased activity. Benadryl for itching. Continue current cares and notify providers with questions or concerns.     06/14/17 4119   Cardiac Rhythm Management Device   Problems Assessed (Cardiac Rhythm Management Device) all   Problems Present (Cardiac Rhythm Management Device) dysrhythmia/arrhythmia;situational response

## 2017-06-15 NOTE — PLAN OF CARE
Problem: Goal Outcome Summary  Goal: Goal Outcome Summary  Edema 6C: Pt with increases in all B LE measurements with modified GCB (1 layer and 25% torque). Pt with soft 2+ pitting in mid shins and to knee crease, 1+-2+ in feet/ankles, firmer 1+-2+ into thighs. GCB initiated to groin on L side to promote additional fluid mobilization, comfort, and ease with mobility and ADLs. GCB applied to knee on R LE. OK for pt to wear compression bandages until therapy returns. However, please remove any compression if it causes numbness, tingling, pain, becomes soiled, or if pt becomes unable to verbalize pain. Will follow up with patient.

## 2017-06-15 NOTE — PLAN OF CARE
Problem: Goal Outcome Summary  Goal: Goal Outcome Summary  OT 6C: Patient seen for ADLs this day. Patient performs bed mobility SBA, HR elevating into 140s with activity. Pt requires max A for lower body dressing to don socks, limited in flexibility and strength. Patient performs stand pivot transfer EOB>commode close CGA with FWW. Patient tolerates standing at sink ~6 min, demos fatigue completes ADLs in chair. Patient below baseline, will benefit from intensive rehab to address medical complexity, decreased tolerance for activity. Recommend: ARU

## 2017-06-15 NOTE — PROGRESS NOTES
Social Work Services Progress Note    Hospital Day: 18  Date of Initial Social Work Evaluation:  5/31/17  Collaborated with:  LILIA Farias ARU Liaison; COSME Antunez    Data:  Jarrett states they anticipate [facility] will have a bed open on Friday, if pt is medically stable to d/c.  He asks that a ride be arranged for 11am.  Insurance authorization started.  COSME Antunez, states that they have consulted Dermatology otherwise may be ready to d/c Friday.  Pt also requests to speak with FRANCO re: insurance paperwork.    Intervention:  FRANCO arranged a w/c ride with [employer] for 11am on Friday, if pt is medically ready to d/c.  Plan is for pt to get the LifeVest tonight per RN CC.  FRANCO met with pt and , Romeo, and informed them of above.  Romeo asks that his [place] paperwork be completed again, as [employer] (his benefits company) only received the first page of his FMLA paperwork by fax.  Romeo states this paperwork must be completed again and re-faxed in its entirety within 7 days.  Romeo is hoping for intermittent leave, being able to work in the mornings, but have the afternoons off to visit pt, attend care conference, participate in PT/OT family training, etc.    FRANCO gave this paperwork to the Fellow to complete.  Fellow will complete and place on FRANCO's desk.  FRANCO to fax the paperwork and give the original back to pt/.    Assessment:  ARU Friday at 11am, if medically stable.    Plan:    Anticipated Disposition:  Facility:  [facility]    Barriers to d/c plan:  Medical stability.    Follow Up:  FRANCO will continue.    WHITNEY Almaraz covering for   [phone] phone  [phone] pager

## 2017-06-15 NOTE — PROGRESS NOTES
Calorie Counts  Intake recorded for: 6/14 Kcals: 0  Protein: 0g  # Meals Recorded: 2 meals ordered from kitchen, no intake recorded.   # Supplements Recorded: no intake recorded.

## 2017-06-15 NOTE — PROGRESS NOTES
SPIRITUAL HEALTH SERVICES  SPIRITUAL ASSESSMENT Progress Note  [facility] 6C     REFERRAL SOURCE: Follow up visit with patient seen on ICU by .    Patient welcomes ongoing  support and requested a visit tomorrow.    PLAN: Will attempt to visit tomorrow.    Caitie Colón   Intern  Pager [phone]

## 2017-06-15 NOTE — PROGRESS NOTES
We evaluated Mrs Michel for the permanent dual chamber defibrillator implant.  She meets all criteria for secondary prevention has occasional slow atrial rates which require atrial pacing.  At this time she needs her left upper extremity eschar from a drug extravasation.  Once this heals and is well granulated we will rapidly schedule a device implant. At this time she will need a life vest for secondary prevention until she is no longer at an elevated infection risk for a permanent device placement.    Gumaro Parmar M.D.  Cardiac Electrophysiology Fellow

## 2017-06-15 NOTE — PROGRESS NOTES
"CARDIOLOGY [facility] PROGRESS NOTE    SUBJECTIVE:  Patient feeling weaker today and reports rash inhibiting participation in rehabilitation. Erythematous rash remains on upper trunk. Some mild musculoskeletal chest pains w/ mobility, no SOB. Some palpitations noted occasionally. Otherwise course unremarkable overnight.      ROS otherwise negative.    OBJECTIVE:  Vital signs:  /60 (BP Location: Right arm)  Pulse 68  Temp 97.9  F (36.6  C) (Oral)  Resp 18  Ht 1.473 m (4' 10\")  Wt 71.4 kg (157 lb 6.4 oz)  SpO2 98%  BMI 32.9 kg/m2    I/O: +165  Intake: 1660 cc  Output: 1495 urine       PHYSICAL EXAM:  Gen: Somewhat ill appearing this am w/o any acute distress  HEENT: MMM, no signs of resp distress   Resp: No signs of resp distress, lungs clear but diminished RLL  CVS: No JVD, S1+S2 with 3/6 ESM loudest at RUSB  Abdo: ND, S, NT, +BS  Extremities: Warm, well-perfused, LUE swollen in appearance but appears well-perfused in appearance with good cap refill and radial and ulnar pulses.  Neuro: GCS 15/15  Skin: Confluent erythematous eruption over upper chest, trunk, upper back and some to legs bilaterally  Recent Labs   Lab Test  06/14/17   0554   HGB  9.1* (9.4)   HCT  28.3* (29.1)   WBC  2.7*   MCV  98   MCH  31.4   MCHC  32.2   RDW  19.4*   PLT  44* (41)    NA  139 (136)   POTASSIUM  4.3 (4.6)   CHLORIDE  103 (103)   CO2  32 (29)   BUN  30   CR  0.72 (0.68)    GLC  92   VIKTORIYA  7.7*   ALBUMIN   --    BILITOTAL   --    ALKPHOS   --    AST   --    ALT   --     < > = values in this interval not displayed.       Micro:  Nil new   Imaging:  US Doppler LUE 06/14: No evidence of left upper extremity deep venous thrombosis.    Cardiac meds: Atenolol 50 mg q24h, dixogin 250 micrograms daily, Non-cardiac meds: Folic acid, gabapentin 200 mg TID, insulin, pantoprazole  Drips: None    ASSESSMENT/PLAN:  Mrs. Amelia Michel is a 56-year old lady with a PMHx of VSD repair (1969), RA, and a recent diagnosis of AFib/AFlutter/SVT who " presented to [facility] on 05/29 after OOH cardiac arrest with shockable rhythm. After ROSC in the field, she was transferred to [facility] for further management. Extubated 06/04.     - Erythematous eruption   - Will ask pharmacy to review meds for any possible culprits (no recent med changes)   - Derm consult    - LUE swelling/Extravasation   - No DVT noted on u/s from 6/14   - Wound care team following      - Out-of-hospital cardiac arrest and cardiogenic shock; Hx of AFib/Aflutter/SVT; sinus arrest   - Continue digoxin 250 micrograms daily and atenolol 50 mg q24h    - ICD will be deferred at this time in lieu of LUE extravasation/high risk of infection and new rash. Patient will need to be discharged w/ LifeVest to bridge until medically stable for ICD.    - Will start Apixaban 2.5 mg BID today and follow plts closely   - Afib rates up to 140's this am prior to meds - will reassess s/p am meds      - Severe critical illness myopathy   - PT/OT ongoing   - Plan to discharge to acute rehab w/ LifeVest       - Severe pancytopenia due to hypoproliferative bone marrow    - Currently stable Plts 40-50   - s/p stress dose hydrocortisone 05/29-06/04 - now on prednisone 15 mg daily as recommended by Rheum.   - Platelet goal >20K                   - Ischemic liver injury complicated by coagulopathy   - Feeding via NGT as well as dysphagia level II diet. Per nutrition calorie intake is not adequate -continue jesse counts and to re-evaluate need for TF daily         Chronic/inactive issues:  - ESBL UTI, aspiration pneumonia: s/p meropenem/ertapenem course  - Hypoxic respiratory failure: s/p meropenem/ertapenem  - CELSA: Stable. At baseline Cr 0.7-0.8; peak Cr 1.9  - RA: Prednisone started per Rheum recs; continue to hold immunosuppressants   - Seizure: levetiracetam discontinued 06/05    Staffed with Dr. Coreen Gilbert [facility]  CSI  X [id number]             Staff Cardiologist: I evaluated the patient on 6/15/17 with the cardiology fellow.   Mrs. Michel has had a remarkable recovery from cardiac arrest complicated by respiratory failure / aspiration pneumonica, ischemic liver injury with secondary coagulopathy, CELSA, pancytopenia, and electrolyte imbalance.  She had underlying AF and has atrial pacer in place but it does not appear to be capturing and it was subsequently removed.  She was extubated on 6/4/17.  She is on steroid replacement for underlying rheumatoid arthritis.  She has been transfused and her Hgb is stable.  She developed extravasation of IVF in LUE but DVT was ruled out.  She is under consideration for AICD but the recent LUE complication will delay that intervention.  She will have a Lifevest placed at discharge.  She has a truncal erythematous rash, etiology unknown.  ? Drug reaction ?   Dermatology consultation.  We will place her on low dose Apixaban 2.5 mg BID.      Dilan Angela MD

## 2017-06-16 ENCOUNTER — HOSPITAL ENCOUNTER (INPATIENT)
Facility: CLINIC | Age: 57
LOS: 3 days | Discharge: ANOTHER HOSPITAL | DRG: 091 | End: 2017-06-19
Attending: PHYSICAL MEDICINE & REHABILITATION | Admitting: PHYSICAL MEDICINE & REHABILITATION
Payer: COMMERCIAL

## 2017-06-16 ENCOUNTER — APPOINTMENT (OUTPATIENT)
Dept: OCCUPATIONAL THERAPY | Facility: CLINIC | Age: 57
DRG: 260 | End: 2017-06-16
Attending: NURSE PRACTITIONER
Payer: COMMERCIAL

## 2017-06-16 VITALS
BODY MASS INDEX: 33.69 KG/M2 | SYSTOLIC BLOOD PRESSURE: 127 MMHG | WEIGHT: 160.5 LBS | OXYGEN SATURATION: 97 % | HEART RATE: 141 BPM | HEIGHT: 58 IN | DIASTOLIC BLOOD PRESSURE: 68 MMHG | RESPIRATION RATE: 18 BRPM | TEMPERATURE: 98.2 F

## 2017-06-16 DIAGNOSIS — M79.602 ARM PAIN, LEFT: ICD-10-CM

## 2017-06-16 DIAGNOSIS — R57.0 CARDIOGENIC SHOCK (H): ICD-10-CM

## 2017-06-16 DIAGNOSIS — R73.9 HYPERGLYCEMIA: ICD-10-CM

## 2017-06-16 DIAGNOSIS — G47.00 INSOMNIA, UNSPECIFIED: ICD-10-CM

## 2017-06-16 DIAGNOSIS — A41.9 SEPSIS, DUE TO UNSPECIFIED ORGANISM: Primary | ICD-10-CM

## 2017-06-16 DIAGNOSIS — E61.9 DEFICIENCY OF NUTRIENT ELEMENT, UNSPECIFIED: ICD-10-CM

## 2017-06-16 PROBLEM — G72.81 CRITICAL ILLNESS MYOPATHY: Status: ACTIVE | Noted: 2017-06-16

## 2017-06-16 LAB
ALBUMIN SERPL-MCNC: 1.8 G/DL (ref 3.4–5)
ALP SERPL-CCNC: 159 U/L (ref 40–150)
ALT SERPL W P-5'-P-CCNC: 60 U/L (ref 0–50)
ANION GAP SERPL CALCULATED.3IONS-SCNC: 5 MMOL/L (ref 3–14)
AST SERPL W P-5'-P-CCNC: 34 U/L (ref 0–45)
BASOPHILS # BLD AUTO: 0 10E9/L (ref 0–0.2)
BASOPHILS NFR BLD AUTO: 0.4 %
BILIRUB DIRECT SERPL-MCNC: 0.3 MG/DL (ref 0–0.2)
BILIRUB SERPL-MCNC: 0.8 MG/DL (ref 0.2–1.3)
BUN SERPL-MCNC: 27 MG/DL (ref 7–30)
CALCIUM SERPL-MCNC: 7.4 MG/DL (ref 8.5–10.1)
CHLORIDE SERPL-SCNC: 103 MMOL/L (ref 94–109)
CO2 SERPL-SCNC: 28 MMOL/L (ref 20–32)
CREAT SERPL-MCNC: 0.66 MG/DL (ref 0.52–1.04)
DIFFERENTIAL METHOD BLD: ABNORMAL
EOSINOPHIL # BLD AUTO: 0.2 10E9/L (ref 0–0.7)
EOSINOPHIL NFR BLD AUTO: 8.4 %
ERYTHROCYTE [DISTWIDTH] IN BLOOD BY AUTOMATED COUNT: 20.1 % (ref 10–15)
GFR SERPL CREATININE-BSD FRML MDRD: ABNORMAL ML/MIN/1.7M2
GLUCOSE BLDC GLUCOMTR-MCNC: 140 MG/DL (ref 70–99)
GLUCOSE BLDC GLUCOMTR-MCNC: 144 MG/DL (ref 70–99)
GLUCOSE BLDC GLUCOMTR-MCNC: 162 MG/DL (ref 70–99)
GLUCOSE BLDC GLUCOMTR-MCNC: 169 MG/DL (ref 70–99)
GLUCOSE BLDC GLUCOMTR-MCNC: 212 MG/DL (ref 70–99)
GLUCOSE BLDC GLUCOMTR-MCNC: 263 MG/DL (ref 70–99)
GLUCOSE SERPL-MCNC: 141 MG/DL (ref 70–99)
HCT VFR BLD AUTO: 27.9 % (ref 35–47)
HGB BLD-MCNC: 9.2 G/DL (ref 11.7–15.7)
IMM GRANULOCYTES # BLD: 0 10E9/L (ref 0–0.4)
IMM GRANULOCYTES NFR BLD: 0.4 %
INR PPP: 1.36 (ref 0.86–1.14)
LYMPHOCYTES # BLD AUTO: 0.5 10E9/L (ref 0.8–5.3)
LYMPHOCYTES NFR BLD AUTO: 20.3 %
MAGNESIUM SERPL-MCNC: 1.7 MG/DL (ref 1.6–2.3)
MCH RBC QN AUTO: 31.7 PG (ref 26.5–33)
MCHC RBC AUTO-ENTMCNC: 33 G/DL (ref 31.5–36.5)
MCV RBC AUTO: 96 FL (ref 78–100)
MONOCYTES # BLD AUTO: 0.2 10E9/L (ref 0–1.3)
MONOCYTES NFR BLD AUTO: 10.6 %
NEUTROPHILS # BLD AUTO: 1.4 10E9/L (ref 1.6–8.3)
NEUTROPHILS NFR BLD AUTO: 59.9 %
NRBC # BLD AUTO: 0 10*3/UL
NRBC BLD AUTO-RTO: 0 /100
PLATELET # BLD AUTO: 51 10E9/L (ref 150–450)
PLATELET # BLD EST: ABNORMAL 10*3/UL
POTASSIUM SERPL-SCNC: 4 MMOL/L (ref 3.4–5.3)
PROT SERPL-MCNC: 4.6 G/DL (ref 6.8–8.8)
RBC # BLD AUTO: 2.9 10E12/L (ref 3.8–5.2)
SODIUM SERPL-SCNC: 136 MMOL/L (ref 133–144)
WBC # BLD AUTO: 2.3 10E9/L (ref 4–11)

## 2017-06-16 PROCEDURE — 25000132 ZZH RX MED GY IP 250 OP 250 PS 637: Performed by: INTERNAL MEDICINE

## 2017-06-16 PROCEDURE — 36415 COLL VENOUS BLD VENIPUNCTURE: CPT | Performed by: INTERNAL MEDICINE

## 2017-06-16 PROCEDURE — 25000132 ZZH RX MED GY IP 250 OP 250 PS 637: Performed by: PHYSICIAN ASSISTANT

## 2017-06-16 PROCEDURE — 80076 HEPATIC FUNCTION PANEL: CPT | Performed by: INTERNAL MEDICINE

## 2017-06-16 PROCEDURE — 25000132 ZZH RX MED GY IP 250 OP 250 PS 637

## 2017-06-16 PROCEDURE — 27210437 ZZH NUTRITION PRODUCT SEMIELEM INTERMED LITER

## 2017-06-16 PROCEDURE — 83735 ASSAY OF MAGNESIUM: CPT | Performed by: INTERNAL MEDICINE

## 2017-06-16 PROCEDURE — 85610 PROTHROMBIN TIME: CPT | Performed by: INTERNAL MEDICINE

## 2017-06-16 PROCEDURE — 99207 ZZC CONSULT E&M CHANGED TO INITIAL LEVEL: CPT | Performed by: INTERNAL MEDICINE

## 2017-06-16 PROCEDURE — 85027 COMPLETE CBC AUTOMATED: CPT | Performed by: INTERNAL MEDICINE

## 2017-06-16 PROCEDURE — 00000146 ZZHCL STATISTIC GLUCOSE BY METER IP

## 2017-06-16 PROCEDURE — 97140 MANUAL THERAPY 1/> REGIONS: CPT | Mod: GO

## 2017-06-16 PROCEDURE — 25000125 ZZHC RX 250: Performed by: STUDENT IN AN ORGANIZED HEALTH CARE EDUCATION/TRAINING PROGRAM

## 2017-06-16 PROCEDURE — 99222 1ST HOSP IP/OBS MODERATE 55: CPT | Performed by: INTERNAL MEDICINE

## 2017-06-16 PROCEDURE — 85004 AUTOMATED DIFF WBC COUNT: CPT | Performed by: INTERNAL MEDICINE

## 2017-06-16 PROCEDURE — 25000132 ZZH RX MED GY IP 250 OP 250 PS 637: Performed by: STUDENT IN AN ORGANIZED HEALTH CARE EDUCATION/TRAINING PROGRAM

## 2017-06-16 PROCEDURE — 40000133 ZZH STATISTIC OT WARD VISIT

## 2017-06-16 PROCEDURE — 12800006 ZZH R&B REHAB

## 2017-06-16 PROCEDURE — 25000131 ZZH RX MED GY IP 250 OP 636 PS 637: Performed by: INTERNAL MEDICINE

## 2017-06-16 PROCEDURE — 80048 BASIC METABOLIC PNL TOTAL CA: CPT | Performed by: INTERNAL MEDICINE

## 2017-06-16 RX ORDER — GABAPENTIN 100 MG/1
200 CAPSULE ORAL 3 TIMES DAILY
Status: DISCONTINUED | OUTPATIENT
Start: 2017-06-16 | End: 2017-06-19 | Stop reason: HOSPADM

## 2017-06-16 RX ORDER — PREDNISONE 10 MG/1
10 TABLET ORAL DAILY
Status: ON HOLD | DISCHARGE
Start: 2017-06-16 | End: 2017-06-19

## 2017-06-16 RX ORDER — DIGOXIN 250 MCG
250 TABLET ORAL DAILY
Status: DISCONTINUED | OUTPATIENT
Start: 2017-06-17 | End: 2017-06-17

## 2017-06-16 RX ORDER — NICOTINE POLACRILEX 4 MG
15-30 LOZENGE BUCCAL
Status: DISCONTINUED | OUTPATIENT
Start: 2017-06-16 | End: 2017-06-19 | Stop reason: HOSPADM

## 2017-06-16 RX ORDER — CETIRIZINE HYDROCHLORIDE 10 MG/1
10 TABLET ORAL DAILY
Qty: 30 TABLET | Status: ON HOLD | DISCHARGE
Start: 2017-06-16 | End: 2017-08-04

## 2017-06-16 RX ORDER — CETIRIZINE HYDROCHLORIDE 10 MG/1
10 TABLET ORAL DAILY
Status: DISCONTINUED | OUTPATIENT
Start: 2017-06-17 | End: 2017-06-19 | Stop reason: HOSPADM

## 2017-06-16 RX ORDER — DIGOXIN 250 MCG
250 TABLET ORAL DAILY
Qty: 30 TABLET | Status: ON HOLD | DISCHARGE
Start: 2017-06-16 | End: 2017-08-04

## 2017-06-16 RX ORDER — ACETAMINOPHEN 325 MG/1
650 TABLET ORAL EVERY 4 HOURS PRN
Status: DISCONTINUED | OUTPATIENT
Start: 2017-06-16 | End: 2017-06-19 | Stop reason: HOSPADM

## 2017-06-16 RX ORDER — DEXTROSE MONOHYDRATE 25 G/50ML
25-50 INJECTION, SOLUTION INTRAVENOUS
Status: DISCONTINUED | OUTPATIENT
Start: 2017-06-16 | End: 2017-06-19 | Stop reason: HOSPADM

## 2017-06-16 RX ORDER — LANOLIN ALCOHOL/MO/W.PET/CERES
3 CREAM (GRAM) TOPICAL
Status: ON HOLD | DISCHARGE
Start: 2017-06-16 | End: 2017-06-19

## 2017-06-16 RX ORDER — PREDNISONE 10 MG/1
10 TABLET ORAL DAILY
Status: DISCONTINUED | OUTPATIENT
Start: 2017-06-17 | End: 2017-06-19 | Stop reason: HOSPADM

## 2017-06-16 RX ORDER — ATENOLOL 50 MG/1
50 TABLET ORAL DAILY
Qty: 30 TABLET | Status: ON HOLD | DISCHARGE
Start: 2017-06-16 | End: 2017-08-04

## 2017-06-16 RX ORDER — AMOXICILLIN 250 MG
1-2 CAPSULE ORAL 2 TIMES DAILY
Qty: 100 TABLET | Status: ON HOLD | DISCHARGE
Start: 2017-06-16 | End: 2017-08-04

## 2017-06-16 RX ORDER — TRIAMCINOLONE ACETONIDE 1 MG/G
OINTMENT TOPICAL 2 TIMES DAILY
Status: DISCONTINUED | OUTPATIENT
Start: 2017-06-16 | End: 2017-06-19 | Stop reason: HOSPADM

## 2017-06-16 RX ORDER — FOLIC ACID 1 MG/1
1 TABLET ORAL DAILY
Status: DISCONTINUED | OUTPATIENT
Start: 2017-06-17 | End: 2017-06-19 | Stop reason: HOSPADM

## 2017-06-16 RX ORDER — LANOLIN ALCOHOL/MO/W.PET/CERES
3 CREAM (GRAM) TOPICAL
Status: DISCONTINUED | OUTPATIENT
Start: 2017-06-16 | End: 2017-06-19 | Stop reason: HOSPADM

## 2017-06-16 RX ORDER — CETIRIZINE HYDROCHLORIDE 10 MG/1
10 TABLET ORAL DAILY
Status: DISCONTINUED | OUTPATIENT
Start: 2017-06-16 | End: 2017-06-16 | Stop reason: HOSPADM

## 2017-06-16 RX ORDER — TRIAMCINOLONE ACETONIDE 1 MG/G
OINTMENT TOPICAL 2 TIMES DAILY
Status: ON HOLD | DISCHARGE
Start: 2017-06-16 | End: 2017-08-04

## 2017-06-16 RX ORDER — MULTIPLE VITAMINS W/ MINERALS TAB 9MG-400MCG
1 TAB ORAL DAILY
Status: DISCONTINUED | OUTPATIENT
Start: 2017-06-17 | End: 2017-06-19 | Stop reason: HOSPADM

## 2017-06-16 RX ORDER — ATENOLOL 50 MG/1
50 TABLET ORAL DAILY
Status: DISCONTINUED | OUTPATIENT
Start: 2017-06-17 | End: 2017-06-19

## 2017-06-16 RX ORDER — AMOXICILLIN 250 MG
1-2 CAPSULE ORAL 2 TIMES DAILY
Status: DISCONTINUED | OUTPATIENT
Start: 2017-06-16 | End: 2017-06-17

## 2017-06-16 RX ORDER — ACETAMINOPHEN 325 MG/1
650 TABLET ORAL EVERY 4 HOURS PRN
Qty: 100 TABLET | Status: ON HOLD | DISCHARGE
Start: 2017-06-16 | End: 2017-08-04

## 2017-06-16 RX ORDER — PREDNISONE 5 MG/1
5 TABLET ORAL DAILY
Status: DISCONTINUED | OUTPATIENT
Start: 2017-07-15 | End: 2017-06-19 | Stop reason: HOSPADM

## 2017-06-16 RX ORDER — NALOXONE HYDROCHLORIDE 0.4 MG/ML
.1-.4 INJECTION, SOLUTION INTRAMUSCULAR; INTRAVENOUS; SUBCUTANEOUS
Status: DISCONTINUED | OUTPATIENT
Start: 2017-06-16 | End: 2017-06-19 | Stop reason: HOSPADM

## 2017-06-16 RX ADMIN — FOLIC ACID 1 MG: 1 TABLET ORAL at 07:48

## 2017-06-16 RX ADMIN — Medication 2 G: at 07:42

## 2017-06-16 RX ADMIN — ATENOLOL 50 MG: 50 TABLET ORAL at 05:36

## 2017-06-16 RX ADMIN — ACETAMINOPHEN 650 MG: 325 TABLET, FILM COATED ORAL at 07:52

## 2017-06-16 RX ADMIN — APIXABAN 2.5 MG: 2.5 TABLET, FILM COATED ORAL at 07:48

## 2017-06-16 RX ADMIN — DIGOXIN 250 MCG: 125 TABLET ORAL at 05:36

## 2017-06-16 RX ADMIN — APIXABAN 2.5 MG: 2.5 TABLET, FILM COATED ORAL at 21:05

## 2017-06-16 RX ADMIN — GABAPENTIN 200 MG: 100 CAPSULE ORAL at 15:40

## 2017-06-16 RX ADMIN — INSULIN ASPART 2 UNITS: 100 INJECTION, SOLUTION INTRAVENOUS; SUBCUTANEOUS at 17:57

## 2017-06-16 RX ADMIN — TRAMADOL HYDROCHLORIDE 50 MG: 50 TABLET, FILM COATED ORAL at 21:05

## 2017-06-16 RX ADMIN — MULTIPLE VITAMINS W/ MINERALS TAB 1 TABLET: TAB at 07:47

## 2017-06-16 RX ADMIN — POTASSIUM CHLORIDE 20 MEQ: 750 TABLET, EXTENDED RELEASE ORAL at 09:12

## 2017-06-16 RX ADMIN — TRIAMCINOLONE ACETONIDE: 1 OINTMENT TOPICAL at 07:53

## 2017-06-16 RX ADMIN — CETIRIZINE HYDROCHLORIDE 10 MG: 10 TABLET, FILM COATED ORAL at 09:12

## 2017-06-16 RX ADMIN — INSULIN ASPART 1 UNITS: 100 INJECTION, SOLUTION INTRAVENOUS; SUBCUTANEOUS at 05:29

## 2017-06-16 RX ADMIN — GABAPENTIN 200 MG: 100 CAPSULE ORAL at 07:48

## 2017-06-16 RX ADMIN — INSULIN ASPART 1 UNITS: 100 INJECTION, SOLUTION INTRAVENOUS; SUBCUTANEOUS at 02:34

## 2017-06-16 RX ADMIN — PREDNISONE 10 MG: 10 TABLET ORAL at 07:47

## 2017-06-16 RX ADMIN — GABAPENTIN 200 MG: 100 CAPSULE ORAL at 21:05

## 2017-06-16 RX ADMIN — ACETAMINOPHEN 650 MG: 325 TABLET, FILM COATED ORAL at 16:39

## 2017-06-16 RX ADMIN — OYSTER SHELL CALCIUM WITH VITAMIN D 2 TABLET: 500; 200 TABLET, FILM COATED ORAL at 07:48

## 2017-06-16 RX ADMIN — TRIAMCINOLONE ACETONIDE: 1 OINTMENT TOPICAL at 19:45

## 2017-06-16 RX ADMIN — Medication 1 MG: at 21:06

## 2017-06-16 RX ADMIN — INSULIN ASPART 1 UNITS: 100 INJECTION, SOLUTION INTRAVENOUS; SUBCUTANEOUS at 09:14

## 2017-06-16 RX ADMIN — PANTOPRAZOLE SODIUM 40 MG: 40 TABLET, DELAYED RELEASE ORAL at 07:48

## 2017-06-16 RX ADMIN — MICONAZOLE NITRATE: 2 POWDER TOPICAL at 07:52

## 2017-06-16 ASSESSMENT — PAIN DESCRIPTION - DESCRIPTORS: DESCRIPTORS: ACHING

## 2017-06-16 NOTE — PLAN OF CARE
Problem: Goal Outcome Summary  Goal: Goal Outcome Summary  Physical Therapy Discharge Summary     Reason for therapy discharge:    Discharged to acute rehabilitation facility.     Progress towards therapy goal(s). See goals on Care Plan in [facility] electronic health record for goal details.  Goals partially met.  Barriers to achieving goals:   discharge from facility.     Therapy recommendation(s):    Continued therapy is recommended.  Rationale/Recommendations:  ARU.

## 2017-06-16 NOTE — PLAN OF CARE
Problem: Goal Outcome Summary  Goal: Goal Outcome Summary  Edema 6C: Pt decreased in 13/15 B LE measurements with GCB wear x 24 hours. Pt with 1+-2+ in feet, trace-1+ ankle to knee crease, soft 2+ in knee crease, and firmer 1+-2+ into thighs. GCB initiated to groin on R side to promote additional fluid mobilization, comfort, and ease with mobility and ADLs. OK for pt to wear compression bandages until therapy returns. However, please remove any compression if it causes numbness, tingling, pain, becomes soiled, or if pt becomes unable to verbalize pain. Will follow up with patient.     Follow up with edema at rehab.

## 2017-06-16 NOTE — PLAN OF CARE
Problem: Goal/Outcome  Goal: Goal Outcome Summary  FOCUS/GOAL  Medical management     ASSESSMENT, INTERVENTIONS AND CONTINUING PLAN FOR GOAL:  Patient arrived to unit at around 11:30am, was able to transfer with Ao1 using walker and gait belt. Patient denies any pain, dressing to left arm wound was changed as previous dressing was falling off.  Patient will be on TF, no orders in upon arrival, also no orders for insulin upon arrival.  Patient ordered meal right when she got here and ate meal before getting BG check, post-prandial was 264 but insulin was not available even once orders received from MD so held dose and next nurse aware to f/u, patient also aware now to wait for nurse to check BG prior to eating.  Patient is on DD3 diet with thin liquids, was able to eat independently.  Patient oriented to room/unit along with , still needs f/u for TF initiation and continuation with admission requirements.

## 2017-06-16 NOTE — PROGRESS NOTES
"SPIRITUAL HEALTH SERVICES  SPIRITUAL ASSESSMENT Progress Note  [facility] 6C     REFERRAL SOURCE: Follow up    Discussed patient's medical situation and care trajectory. She expressed her anxiety and concern around shifting to rehab and ultimately going home, and then returning for surgery when she's ready. 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 Patient had her arrest after being sent home after prior hospital stay, so she expressed that going home again is a point of concern and anxiety - she is afraid of having another heart attack at home. Patient expressed feeling the stress of transition and fear around using the life vest and if it'll really work. We discussed how she miko with the anxiety around going home and she said it was the hope for getting better. I offered to say a prayer and she declined.    PLAN: I let patient know I will follow up if/when she returns to this floor for surgery. Until then, I let her know that there are chaplains in rehab and that she can request to see the  at any time.    Caitie Colón   Intern  Pager [phone]  "

## 2017-06-16 NOTE — IP AVS SNAPSHOT
` `     [facility]: [phone]            Medication Administration Report for Amelia Michel as of 06/19/17 1405   Legend:    Given Hold Not Given Due Canceled Entry Other Actions    Time Time (Time) Time  Time-Action       Inactive    Active    Linked        Medications 06/13/17 06/14/17 06/15/17 06/16/17 06/17/17 06/18/17 06/19/17    0.9% sodium chloride BOLUS  Dose: 250 mL Freq: ONCE Route: IV  Start: 06/19/17 0830          [ ] 0830           acetaminophen (TYLENOL) tablet 650 mg  Dose: 650 mg Freq: EVERY 4 HOURS PRN Route: PO  PRN Reason: mild pain  Start: 06/16/17 1134   Admin Instructions: Maximum acetaminophen dose from all sources = 75 mg/kg/day not to exceed 4 grams/day.        1639 (650 mg)-Given        0522 (650 mg)-Given       1613 (650 mg)-Given        0027 (650 mg)-Given       0849 (650 mg)-Given       1835 (650 mg)-Given        0713 (650 mg)-Given           apixaban ANTICOAGULANT (ELIQUIS) tablet 2.5 mg  Dose: 2.5 mg Freq: 2 TIMES DAILY Route: PO  Start: 06/16/17 2100       2105 (2.5 mg)-Given        0806 (2.5 mg)-Given       2017 (2.5 mg)-Given        0851 (2.5 mg)-Given       2105 (2.5 mg)-Given        0829 (2.5 mg)-Given       [ ] 2000           ascorbic acid (VITAMIN C) tablet 500 mg  Dose: 500 mg Freq: DAILY Route: PO  Start: 06/18/17 1015   End: 06/28/17 0759         1117 (500 mg)-Given        (0951)-Not Given           atenolol (TENORMIN) tablet 50 mg  Dose: 50 mg Freq: HOLD Route: PO  PRN Comment: until resumed by MD  Start: 06/19/17 0845   Admin Instructions: Hold if MAP 70 or less or HR 55 or less               beta carotene capsule 25,000 Units  Dose: 25,000 Units Freq: DAILY Route: PO  Start: 06/18/17 1015   End: 06/28/17 0759         1234 (25,000 Units)-Given        (0951)-Not Given           calcium-vitamin D (CALTRATE) 600-400 MG-UNIT per tablet 2 tablet  Dose: 2 tablet Freq: EVERY MORNING Route: PO  Start: 06/17/17 0900   Admin Instructions: Formulary alternate  for<br>Caltrate 600-800 mg-unit         0806 (2 tablet)-Given        0850 (2 tablet)-Given        (0951)-Not Given           cetirizine (zyrTEC) tablet 10 mg  Dose: 10 mg Freq: DAILY Route: PO  Start: 06/17/17 0800        0807 (10 mg)-Given        0851 (10 mg)-Given        0830 (10 mg)-Given           co-enzyme Q-10 capsule 30 mg  Dose: 30 mg Freq: DAILY Route: PO  Start: 06/17/17 0800        0805 (30 mg)-Given        0850 (30 mg)-Given        (0952)-Not Given           dextrose 10 % 1,000 mL infusion  Freq: CONTINUOUS PRN Route: IV  PRN Comment: Hypoglycemia prevention  Start: 06/16/17 1413   Admin Instructions: For Hypoglycemia Prevention for patients on long-acting subcutaneous basal insulin (Glargine, Detemir, NPH) or continuous insulin infusion. Whenever nutrition support is held or interrupted:   1) Infuse IV D10W at nutrition support rate  2) Notify provider for further instructions               digoxin (LANOXIN) tablet 250 mcg  Dose: 250 mcg Freq: DAILY Route: PO  Start: 06/18/17 0600         0634 (250 mcg)-Given [C]        0602 (250 mcg)-Given [C]           ertapenem (INVanz) 1 g vial to attach to  mL bag  Dose: 1 g Freq: EVERY 24 HOURS Route: IV  Indications of Use: SEPSIS  Start: 06/19/17 0830   Admin Instructions: Infuse over 30 minutes.           1102 (1 g)-New Bag           folic acid (FOLVITE) tablet 1 mg  Dose: 1 mg Freq: DAILY Route: PO  Start: 06/17/17 0800        0806 (1 mg)-Given        1431 (1 mg)-Given        (0952)-Not Given           gabapentin (NEURONTIN) capsule 200 mg  Dose: 200 mg Freq: 3 TIMES DAILY Route: PO  Start: 06/16/17 1400       1540 (200 mg)-Given       2105 (200 mg)-Given        0805 (200 mg)-Given       1417 (200 mg)-Given       2017 (200 mg)-Given        0850 (200 mg)-Given       1426 (200 mg)-Given       2104 (200 mg)-Given        0829 (200 mg)-Given       [ ] 1400       [ ] 2000           glucose 40 % gel 15-30 g  Dose: 15-30 g Freq: EVERY 15 MIN PRN Route: PO  PRN  Reason: low blood sugar  Start: 06/16/17 1311   Admin Instructions: Give 15 g for BG 51 to 69 mg/dL IF patient is conscious and able to swallow. Give 30 g for BG less than or equal to 50 mg/dL IF patient is conscious and able to swallow. Do NOT give glucose gel via enteral tube.  IF patient has enteral tube: give apple juice 120 mL (4 oz or 15 g of CHO) via enteral tube for BG 51 to 69 mg/dL.  Give apple juice 240 mL (8 oz or 30 g of CHO) via enteral tube for BG less than or equal to 50 mg/dL.              Or  dextrose 50 % injection 25-50 mL  Dose: 25-50 mL Freq: EVERY 15 MIN PRN Route: IV  PRN Reason: low blood sugar  Start: 06/16/17 1311   Admin Instructions: Use if have IV access, BG less than 70 mg/dL and meet dose criteria below:  Dose if conscious and alert (or disorientated) and NPO = 25 mL  Dose if unconscious / not alert = 50 mL  Vesicant.              Or  glucagon injection 1 mg  Dose: 1 mg Freq: EVERY 15 MIN PRN Route: SC  PRN Reason: low blood sugar  PRN Comment: May repeat x 1 only  Start: 06/16/17 1311   Admin Instructions: May give SQ or IM. IF BG less than or equal to 50 mg/dL and no IV access.  ONLY use glucagon IF patient has NO IV access AND is UNABLE to swallow.               insulin aspart (NovoLOG) inj (RAPID ACTING)  Dose: 1-5 Units Freq: AT BEDTIME Route: SC  Start: 06/16/17 2100   Admin Instructions: MEDIUM INSULIN RESISTANCE DOSING    Do Not give Bedtime Correction Insulin if BG less than  200.   For  - 249 give 1 units.   For  - 299 give 2 units.   For  - 349 give 3 units.   For  -399 give 4 units.   For BG greater than or equal to 400 give 5 units.  Notify provider if glucose greater than or equal to 350 mg/dL after administration of correction dose.  If given at mealtime, must be administered 5 min before meal or immediately after.        (2224)-Not Given [C]        (2256)-Not Given [C]        (2116)-Not Given        [ ] 2100           insulin aspart (NovoLOG)  "inj (RAPID ACTING)  Dose: 1-7 Units Freq: 3 TIMES DAILY BEFORE MEALS Route: SC  Start: 06/16/17 1315   Admin Instructions: Correction Scale - MEDIUM INSULIN RESISTANCE DOSING     Do Not give Correction Insulin if Pre-Meal BG less than 140.   For Pre-Meal  - 189 give 1 unit.   For Pre-Meal  - 239 give 2 units.   For Pre-Meal  - 289 give 3 units.   For Pre-Meal  - 339 give 4 units.   For Pre-Meal - 399 give 5 units.   For Pre-Meal -449 give 6 units  For Pre-Meal BG greater than or equal to 450 give 7 units.   To be given with prandial insulin, and based on pre-meal blood glucose.    Notify provider if glucose greater than or equal to 350 mg/dL after administration of correction dose.  If given at mealtime, must be administered 5 min before meal or immediately after.        (1441)-Not Given [C]       1757 (2 Units)-Given [C]        0818 (1 Units)-Given       1200 (2 Units)-Given [C]       1813 (1 Units)-Given [C]        0852 (1 Units)-Given       1221 (3 Units)-Given       1836 (1 Units)-Given        0906 (1 Units)-Given       1223 (2 Units)-Given [C]       [ ] 1700           lidocaine (LMX4) kit  Freq: EVERY 1 HOUR PRN Route: Top  PRN Reason: pain  PRN Comment: with VAD insertion or accessing implanted port.  Start: 06/19/17 0954   Admin Instructions: Do NOT give if patient has a history of allergy to any local anesthetic or any \"arsenio\" product.  Apply 30 minutes prior to VAD insertion or port access. MAX Dose: 2.5 g (  of 5 g tube).               lidocaine (LMX4) kit  Freq: EVERY 1 HOUR PRN Route: Top  PRN Reason: moderate pain  PRN Comment: with VAD insertion or accessing implanted port  Start: 06/19/17 0954   Admin Instructions: Do NOT give if patient has a history of allergy to any local anesthetic or any \"arsenio\" product.   Apply 30 minutes prior to VAD insertion or port access.  MAX Dose:  2.5 g (  of 5 g tube)               lidocaine 1 % 1 mL  Dose: 1 mL Freq: EVERY 1 HOUR PRN " "Route: OTHER  PRN Comment: mild pain with VAD insertion or accessing implanted port  Start: 06/19/17 0954   Admin Instructions: Do NOT give if patient has a history of allergy to any local anesthetic or any \"arsenio\" product. MAX dose 1 mL subcutaneous OR intradermal in divided doses.               lidocaine 1 % 1 mL  Dose: 1 mL Freq: EVERY 1 HOUR PRN Route: OTHER  PRN Comment: mild pain with VAD insertion or accessing implanted port  Start: 06/19/17 0954   Admin Instructions: Do NOT give if patient has a history of allergy to any local anesthetic or any \"arsenio\" product. MAX dose 1 mL subcutaneous OR intradermal in divided doses.               melatonin tablet 1 mg  Dose: 1 mg Freq: AT BEDTIME Route: PO  Start: 06/16/17 2100 2106 (1 mg)-Given        (2019)-Not Given        (2106)-Not Given        [ ] 2100           melatonin tablet 3 mg  Dose: 3 mg Freq: AT BEDTIME PRN Route: PO  PRN Reason: sleep  Start: 06/16/17 1134              morphine 0.1% in solosite topical gel  Freq: 3 TIMES DAILY Route: TD  Start: 06/18/17 0900   Admin Instructions: Apply to left upper arm painful area          (1223)-Not Given       1427 ( )-Given       2240 ( )-Given [C]        0908 ( )-Given       [ ] 1400       [ ] 2000           multivitamin, therapeutic with minerals (THERA-VIT-M) tablet 1 tablet  Dose: 1 tablet Freq: DAILY Route: PO  Start: 06/17/17 0800   Admin Instructions: Formulary alternate for<br>Women's multivitamin         0806 (1 tablet)-Given        0850 (1 tablet)-Given        (0952)-Not Given           naloxone (NARCAN) injection 0.1-0.4 mg  Dose: 0.1-0.4 mg Freq: EVERY 2 MIN PRN Route: IV  PRN Reason: opioid reversal  Start: 06/16/17 1230   Admin Instructions: For respiratory rate LESS than or EQUAL to 8.  Partial reversal dose:  0.1 mg titrated q 2 minutes for Analgesia Side Effects Monitoring Sedation Level of 3 (frequently drowsy, arousable, drifts to sleep during conversation).Full reversal dose:  0.4 mg bolus " for Analgesia Side Effects Monitoring Sedation Level of 4 (somnolent, minimal or no response to stimulation).               oxyCODONE (ROXICODONE) IR tablet 5 mg  Dose: 5 mg Freq: ONCE Route: PO  Start: 06/18/17 0845         (1223)-Not Given            oxyCODONE (ROXICODONE) IR tablet 5 mg  Dose: 5 mg Freq: EVERY 4 HOURS PRN Route: PO  PRN Reason: moderate to severe pain  Start: 06/18/17 1300         2105 (5 mg)-Given            Patient is already receiving anticoagulation with heparin, enoxaparin (LOVENOX), warfarin (COUMADIN)  or other anticoagulant medication  Freq: CONTINUOUS PRN Route: XX  Start: 06/16/17 1134              predniSONE (DELTASONE) tablet 10 mg  Dose: 10 mg Freq: DAILY Route: PO  Start: 06/17/17 0800   End: 07/01/17 0759        0806 (10 mg)-Given        0851 (10 mg)-Given        0830 (10 mg)-Given           senna-docusate (SENOKOT-S;PERICOLACE) 8.6-50 MG per tablet 1-2 tablet  Dose: 1-2 tablet Freq: AT BEDTIME PRN Route: PO  PRN Reason: constipation  Start: 06/17/17 0715        0806 (2 tablet)-Given             sodium chloride (PF) 0.9% PF flush 3 mL  Dose: 3 mL Freq: EVERY 8 HOURS Route: IK  Start: 06/19/17 1000   Admin Instructions: And Q1H PRN, to lock peripheral IV dormant line.           (1003)-Not Given [C]       [ ] 1800           sodium chloride (PF) 0.9% PF flush 3 mL  Dose: 3 mL Freq: EVERY 1 HOUR PRN Route: IK  PRN Reason: line flush  Start: 06/19/17 0954   Admin Instructions: for peripheral IV flush post IV meds               triamcinolone (KENALOG) 0.1 % ointment  Freq: 2 TIMES DAILY Route: Top  Start: 06/16/17 2100   Admin Instructions: Apply to affected area        1945 ( )-Given [C]               1004 ( )-Given       2021 ( )-Given        0856 ( )-Given       (2108)-Not Given        0926 ( )-Given       [ ] 2000           vancomycin (VANCOCIN) 1,500 mg in NaCl 0.9 % 250 mL intermittent infusion  Dose: 1,500 mg Freq: EVERY 12 HOURS Route: IV  Indications of Use: SKIN AND SOFT TISSUE  INFECTION  Start: 06/19/17 0830   Admin Instructions: For an adult with peripheral catheter and dose of 2-2.5 g, infuse over 2 hours.  Vesicant. IF central catheter, doses below 2 g may be given over 1 hour.           1205 (1,500 mg)-New Bag       [ ] 2045           zinc sulfate (ZINCATE) capsule 220 mg  Dose: 220 mg Freq: DAILY Route: PO  Start: 06/18/17 1015   End: 06/28/17 0759         1233 (220 mg)-Given        (0953)-Not Given          Future Medications  Medications 06/13/17 06/14/17 06/15/17 06/16/17 06/17/17 06/18/17 06/19/17       predniSONE (DELTASONE) tablet 5 mg  Dose: 5 mg Freq: DAILY Route: PO  Start: 07/15/17 0800              predniSONE (DELTASONE) tablet 7.5 mg  Dose: 7.5 mg Freq: DAILY Route: PO  Start: 07/01/17 0800   End: 07/15/17 0759             Completed Medications  Medications 06/13/17 06/14/17 06/15/17 06/16/17 06/17/17 06/18/17 06/19/17         Start: 06/19/17 0931   End: 06/19/17 1108   Admin Instructions: Archana Morales : cabinet override           1108 (50 mL)-New Bag [C]             Dose: 25 mg Freq: ONCE Route: PO  Start: 06/18/17 0215   End: 06/18/17 0222         0222 (25 mg)-Given           Discontinued Medications  Medications 06/13/17 06/14/17 06/15/17 06/16/17 06/17/17 06/18/17 06/19/17         Rate: 500 mL/hr Freq: CONTINUOUS Route: IV  Start: 06/19/17 0815   End: 06/19/17 0822          0822-Med Discontinued  (0833)-Not Given             Rate: 500 mL/hr Freq: CONTINUOUS Route: IV  Start: 06/19/17 0815   End: 06/19/17 0800          0800-Med Discontinued         Rate: 500 mL/hr Freq: CONTINUOUS Route: IV  Start: 06/19/17 0745   End: 06/19/17 0800          0800-Med Discontinued  (0834)-Not Given             Dose: 50 mg Freq: DAILY Route: PO  Start: 06/17/17 0800   End: 06/19/17 0838   Admin Instructions: Hold if MAP 70 or less or HR 55 or less         0807 (50 mg)-Given [C]        0928 (50 mg)-Given        0830 (50 mg)-Given       0838-Med Discontinued         Dose: 250 mcg Freq:  DAILY Route: PO  Start: 06/17/17 0800   End: 06/17/17 0820        0806 (250 mcg)-Given       0820-Med Discontinued           Freq: HOLD Route: XX  Start: 06/19/17 0834   End: 06/19/17 0838          0838-Med Discontinued         Dose: 10 mg Freq: EVERY 12 HOURS Route: PO  Start: 06/18/17 0845   End: 06/18/17 0832   Admin Instructions: DO NOT CRUSH.          0832-Med Discontinued          Dose: 3.375 g Freq: EVERY 6 HOURS Route: IV  Indications of Use: SKIN AND SOFT TISSUE INFECTION  Start: 06/19/17 0830   End: 06/19/17 0820          0820-Med Discontinued         Dose: 1-2 tablet Freq: 2 TIMES DAILY Route: PO  Start: 06/16/17 2100   End: 06/17/17 0703       (2107)-Not Given        0703-Med Discontinued           Dose: 50 mg Freq: EVERY 6 HOURS PRN Route: PO  PRN Reason: moderate pain  Start: 06/16/17 1134   End: 06/18/17 1007       2105 (50 mg)-Given        2017 (25 mg)-Given [C]        0027 (25 mg)-Given [C]       1007-Med Discontinued          Dose: 1,250 mg Freq: EVERY 12 HOURS Route: IV  Indications of Use: SKIN AND SOFT TISSUE INFECTION  Start: 06/19/17 0830   End: 06/19/17 0835   Admin Instructions: For an adult with peripheral catheter and dose of 2-2.5 g, infuse over 2 hours.  Vesicant. IF central catheter, doses below 2 g may be given over 1 hour.           0835-Med Discontinued  (1247)-Not Given        Medications 06/13/17 06/14/17 06/15/17 06/16/17 06/17/17 06/18/17 06/19/17

## 2017-06-16 NOTE — H&P
Post Admission Physician Evaluation:    I have compared Bree's condition on admission to acute rehabilitation to that outlined in the preadmission screen. History and physical exam performed by me.  While there is weakness in broader distribution, the severity isn't necessarily as consistent with critical illness myopathy and may represent more direct deconditioning. Recovery rate may help differentiate ([facility] traditionally recovers more slowly). 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 There also seems sensory and weakness left hand in median nerve distribution that may be linked with left forearm tissue necrosis wound. No other significant differences are identified and the patient remains appropriate for an inpatient rehabilitation facility level of care to manage medical issues and address functional impairments.    Comorbid medical conditions being managed: cardiac arrest, presently with life vest defibrillator, anasarca/lymphedema, afib/recent vfib, HTN, RA, undernourishment (getting tube feeds still), left arm vascular access extravasation / necrotic tissue wound.     Prior functional level: fully independent    Present function: weakness requires assist with transfers and ambulation. Focal left hand / index finger weakness on top of more generalized weakness pattern. Needing assist with some ADL's. Cognition seems to be pretty good.    Anticipated rehabilitation course:   Will benefit from intensive rehabilitation includin minutes each of PT and OT  Rehabilitation nursing  Close management by physiatry  Hospitalist consult    Prognosis: good to discharge home, the rate depends on myopathy vs deconditioning though both have eventual good outcome. Complex medical issues with cardiac arrest will need close management and risk for future event.    Estimated length of stay: 7-10 days

## 2017-06-16 NOTE — PLAN OF CARE
DISCHARGE     Discharged to:   [facility]  Via:  EMT/[facility]  Accompanied by:  Self,  (Wolf) aware per pt  Discharge Instructions:  Diet, activity, medications, follow up appointments, when to call the MD, and what to watchout for (i.e. s/s of infection, increasing SOB, palpitations, chest pain,)  Prescriptions:  To be filled by [facility] pharmacy per pt's request; medication list reviewed & sent with pt  Follow Up Appointments:  Arranged; information given  Belongings:  All sent with pt  IV:  Out, [place] in  Telemetry: off  Pt exhibits understanding of above discharge instructions; all questions answered.  Discharge Paperwork:  Signed, faxed per SW note

## 2017-06-16 NOTE — DISCHARGE INSTRUCTIONS
About Arrhythmias    Electrical impulses cause the normal heart to beat 60 to 100 times a minute while at rest. These impulses come from a natural pacemaker deep inside the heart muscle. Each impulse causes the heart muscle to contract. This causes the blood to flow through the heart and out to the tissues and organs of your body.  An arrhythmia is a change from the normal speed or pattern of these electrical impulses. This can cause the heart to beat too fast (tachycardia); or too slow (bradycardia); or in an unsteady pattern (irregular rhythm).  Symptoms of arrhythmias  Different people experience arrhythmias differently. Sometimes they may not have symptoms, but just notice a change in their pulse. Symptoms can include:    Fluttering feeling in the chest    Shortness of breath    Chest pain or pressure    Neck fullness    Lightheadedness or dizziness    Fainting or almost fainting    Palpitations (the sense that your heart is fluttering or beating fast or hard or irregularly)    Tiredness, fatigue, or weakness    Cardiac arrest  Causes of arrhythmias  Arrhythmias are most often due to heart disease such as:    Coronary artery disease    Heart valve disease    Enlarged heart    High blood pressure    Heart failure  Other causes of  arrhythmia include:    Certain medicines (such as asthma inhalers and decongestants)    Some herbal supplements    Cardiac stimulant drugs (such as cocaine, amphetamine, diet pills, certain decongestant cold medicines, caffeine, and nicotine)    Excessive alcohol use    Anxiety and panic disorder    Thyroid disease    Anemia    Diabetes    Sleep apnea    Obesity    Congenital heart disease    Cardiac genetic diseases  Arrhythmias can often be prevented. The cause and type of arrhythmia determines the best treatment. Sometimes your doctor may want to monitor your heart rate over a 24-hour period or longer. This can help identify the cause of your arrhythmia and find the best treatment.  This can be done with a Holter monitor, a portable EKG recording device attached by wires to your chest. Or you may get an event monitor, which you can place over the skin in front of your heart to record heart rhythms. You can carry this with you as you go about your routine activities during the monitoring period. Implantable loop recorders may also be used to monitor the heart rhythm for up to 2 years. This miniature device is placed underneath the skin overlying the heart.  Home care  The following guidelines will help you care for yourself at home:    Avoid cardiac stimulants (such as cocaine, amphetamine, diet pills, certain decongestant cold medicines, caffeine, and nicotine).    If you smoke, stop smoking. Contact your doctor or a local stop-smoking program for help.    Tell your doctor about any prescription, over-the-counter, or herbal medicines you take. These may be affecting your heart rhythm.  Follow-up care  Follow up with your healthcare provider, or as advised. If a Holter monitor has been recommended, contact the cardiologist you have been referred to as soon as you can  the device. Other outpatient tests may also be arranged for you at that time.  Call 911  This is the fastest and safest way to get to the emergency department. The paramedics can also start treatment on the way to the hospital, if needed.  Don't wait until your symptoms are severe to call 911. Other reasons to call 911 besides chest pain include:    Chest, shoulder, arm, neck, or back pain    Shortness of breath    Feeling lightheaded, faint, or dizzy    Unexplained fainting    Rapid heart beat    Slower than usual heart rate compared to your normal    Very irregular heartbeat    Chest pain (angina) with weakness, dizziness, heavy sweating, nausea, or vomiting    Extreme drowsiness, or confusion    Weakness of an arm or leg or one side of the face    Difficulty with speech or vision  When to seek medical advice  Remember,  "things are not always like they are on TV. Sometimes it is not so obvious. You may only feel weak or just \"not right.\" If it is not clear or if you have any doubt, call for advice.    Seek help for chest pain, or it feels different from usual, even if your symptoms are mild.    Don't drive yourself. Have someone else drive. If no one can drive you, call 911.    If your doctor has given you medicines to take when you have symptoms, take them, but do not delay getting help while trying to find them.  Date Last Reviewed: 4/25/2016 2000-2017 [employer]. [address] [place] [address], [place] [address]. All rights reserved. This information is not intended as a substitute for professional medical care. Always follow your healthcare professional's instructions.        "

## 2017-06-16 NOTE — PROGRESS NOTES
"    [facility]   Acute Rehabilitation Unit  Admission History and Physical    chief complaint   \"here to get better\"    History of Present illness  Amelia Michel \"Bree\" is a 56 year old right hand dominant female with a history of Rheumatoid Arthritis, HTN, VSD repair 1969, recently diagnosed a- fib with hospitalization 5/15-5/23, admitted to Cardiology 5/29 s/p cardiac arrest with rosc,s/p therapeutic hypothermia.  Etiology of cardiac arrest remains unclear.  Post arrest course was complicated by multi-organ failure including: pancytopenia, shock liver,  hypoxic respiratory failure,  CELSA, anoxic brain injury, and critical illness myopathy.  Prolonged hospital course further complicated by ESBL UTI and aspiration PNA.      Treated with stress dose steroids with ongoing steroid taper.  Planning for ICD per EP unable to proceed with implantation at this time due to LUE IV extravasation/ felt to be high infection risk.  Discharged from hospital to ARU for ongoing medical management and aggressive rehabilitation with life vest in place.          On arrival to ARU patient reports overall doing well, denies nausea, chest pain, sob, headache or dizziness.  Reports ~ three loose stools daily without abdominal pain.  Reports intermittent palpitations during which describes feelings of nausea.  Bree fatigued, chronic urinary incontinence, decreased patience with self and other  she feels is related to prednisone.  Reports poor appetite with dislike for our menu, \"I like cold food, you have limited options', encouraged , Wolf, to bring in some food per her preferences.      Functionally, the patient is requiring mod assist for grooming, max assist for bathing, dressing, transfers with min assist ambulating with min assist.  Noted to have impaired memory and safety awareness.     PAST Medical History   Reviewed and updated in Epic.  Past Medical History:   Diagnosis Date     " Hypertension      Rheumatoid arthritis(714.0)        Surgical History  Reviewed and updated in Epic.  Past Surgical History:   Procedure Laterality Date     ANESTHESIA CARDIOVERSION N/A 5/17/2017    Procedure: ANESTHESIA CARDIOVERSION;  ANESTHESIA CARDIOVERSION;  Surgeon: GENERIC ANESTHESIA PROVIDER;  Location: [facility] OR     ARTHRODESIS WRIST  2000    Right wrist     FOOT SURGERY      4 left and 2 right     RELEASE CARPAL TUNNEL BILATERAL       REPAIR VENTRICULAR SEPTAL DEFECT  1969       SOCIAL HISTORY  Reviewed and updated in Epic.  Marital Status:   Living situation: lives with  2 jesse  Family support: supportive willing to take time off work if needed to support Bree  Vocational History: Works as clinical   Tobacco use: none  Alcohol use: 1 glass of wine per night  Illicit drug use: none  Social History     Social History     Marital status:      Spouse name: Romeo Michel     Number of children: 0     Years of education: N/A     Occupational History      [employer]     Social History Main Topics     Smoking status: Never Smoker     Smokeless tobacco: Never Used     Alcohol use Yes      Comment: Glass of wine two evenings a night     Drug use: No     Sexual activity: Not on file     Other Topics Concern     Parent/Sibling W/ Cabg, Mi Or Angioplasty Before 65f 55m? No     Social History Narrative       FAMILY HISTORY  Reviewed and updated in Epic.  Family History   Problem Relation Age of Onset     Breast Cancer Mother      Hypertension Mother      Alzheimer Disease Mother      Hypertension Father      Blood Disease Father      Lymphoa     Circulatory Father      A Fib     DIABETES Paternal Grandmother          Prior Functional history   Pt was independent with all ADLs/IADLs, transfers, mobility and gait.      Medications  Scheduled meds  Prescriptions Prior to Admission   Medication Sig Dispense Refill Last Dose     melatonin 3 MG tablet Take 1 tablet  (3 mg) by mouth nightly as needed for sleep        acetaminophen (TYLENOL) 325 MG tablet Take 2 tablets (650 mg) by mouth every 4 hours as needed for mild pain 100 tablet       apixaban ANTICOAGULANT (ELIQUIS) 2.5 MG tablet Take 1 tablet (2.5 mg) by mouth 2 times daily        cetirizine (ZYRTEC) 10 MG tablet Take 1 tablet (10 mg) by mouth daily 30 tablet       atenolol (TENORMIN) 50 MG tablet Take 1 tablet (50 mg) by mouth daily 30 tablet       digoxin (LANOXIN) 250 MCG tablet Take 1 tablet (250 mcg) by mouth daily 30 tablet       predniSONE (DELTASONE) 10 MG tablet Take 1 tablet (10 mg) by mouth daily        triamcinolone (KENALOG) 0.1 % ointment Apply topically 2 times daily        senna-docusate (SENOKOT-S;PERICOLACE) 8.6-50 MG per tablet Take 1-2 tablets by mouth 2 times daily 100 tablet       Calcium Carb-Cholecalciferol 600-800 MG-UNIT TABS Take 2 tablets by mouth every morning   5/28/2017 at Unknown time     Multiple Vitamins-Minerals (WOMENS MULTIVITAMIN PO) Take 1 tablet by mouth daily At bedtime   5/28/2017 at Unknown time     Coenzyme Q10 (COQ-10 PO) Take 1 tablet by mouth daily In the morning   5/28/2017 at Unknown time     gabapentin (NEURONTIN) 100 MG capsule Take 2 capsules (200 mg) by mouth 3 times daily (Patient taking differently: Take 200 mg by mouth Take 2 capsules (200 mg) by mouth every 6 hours (4 am, 10am, 4pm, 10pm)) 180 capsule 3 5/28/2017 at Unknown time     traMADol (ULTRAM) 50 MG tablet Take 50 mg by mouth Every 6 hours as needed for pain   5/28/2017 at Unknown time     folic acid (FOLVITE) 1 MG tablet Take 1 mg by mouth daily.   5/28/2017 at Unknown time       ALLERGIES     Allergies   Allergen Reactions     Penicillins      Tape [Adhesive Tape] Rash         Review of Systems  A 10 point ROS was performed and negative unless otherwise noted in HPI.       Physical Exam  VITAL SIGNS:  /49 (BP Location: Right arm)  Pulse 71  Temp 97.4  F (36.3  C) (Oral)  Resp 18  Ht 1.473 m (4'  "10\")  Wt 72.6 kg (160 lb)  SpO2 98%  BMI 33.44 kg/m2  BMI:  Estimated body mass index is 33.44 kg/(m^2) as calculated from the following:    Height as of this encounter: 1.473 m (4' 10\").    Weight as of this encounter: 72.6 kg (160 lb).     General:   HEENT: mucus membranes moist no scleral icterus  Pulmonary: non labored clear on room air  Cardiovascular: RRR   Abdominal: soft non distended non tender  Extremities: lymphedema wraps in place with edema to bilateral thighs  MSK/neuro:   Mental Status:  alert and oriented x3    Cranial Nerves: grossly normal    Sensory: Normal to light touch in bilateral upper and lower extremities, with chronic right lower extremity numbness and tingling and acute numbness/tingling left thumb and pointer finger.    Strength: moves all extremities against gravity, decreased ROM at left fingers/wrist and bicep.    Reflexes: Right knee hyperreflexic compared to left   Babinski reflex: down going bilaterally    Abnormal movements: None    Coordination: No dysmetria on finger to nose b/l    Speech: clear coherent   Cognition: alert intact to conversation reported memory impairment     Skin: red non raised rash more dense on chest extending down abdomen,  Right posterior arm ecchymosis, left ac with grey/yellow eschar with surrounding pink granulating skin.       Labs  Lab Results   Component Value Date    WBC 2.3 06/16/2017     Lab Results   Component Value Date    RBC 2.90 06/16/2017     Lab Results   Component Value Date    HGB 9.2 06/16/2017     Lab Results   Component Value Date    HCT 27.9 06/16/2017     Lab Results   Component Value Date    MCV 96 06/16/2017     Lab Results   Component Value Date    MCH 31.7 06/16/2017     Lab Results   Component Value Date    MCHC 33.0 06/16/2017     Lab Results   Component Value Date    RDW 20.1 06/16/2017     Lab Results   Component Value Date    PLT 51 06/16/2017     Last Basic Metabolic Panel:  Lab Results   Component Value Date     " 06/16/2017      Lab Results   Component Value Date    POTASSIUM 4.0 06/16/2017     Lab Results   Component Value Date    CHLORIDE 103 06/16/2017     Lab Results   Component Value Date    VIKTORIYA 7.4 06/16/2017     Lab Results   Component Value Date    CO2 28 06/16/2017     Lab Results   Component Value Date    BUN 27 06/16/2017     Lab Results   Component Value Date    CR 0.66 06/16/2017     Lab Results   Component Value Date     06/16/2017         IMPRESSION/PLAN:  Amelia Michel is a 56 year old woman with a hx HTN, RA, recently diagnosed Afib/Flutter (5/15-5/23 hospitalization) and VSD s/p repair (1969) who was admitted on 5/29 to [facility] after out of hospital cardiac arrest with ROSC and s/p therapeutic hypothermia, hospital course was complicated by multisystem organ failure, PNA, and UTI, poor oral intake,  debility and suspected critical illness myopathy. Admitted to ARU 6/16 for ongoing medical management and aggressive rehabilitation.     Admission to acute inpatient rehab debility possible critical illness myopathy s/p cardiac arrest.    Impairment group code: 03.8      1. PT, OT and SLP 60 minutes of each on a daily basis, in addition to rehab nursing and close management of physiatrist.      2. Impairment of ADL's: noted to have impaired ROM, activity tolerance, coordination, safety.  Currently performing lower body dressing with max assist, transferring with close CGA with FWW, fatigues while completing standing ADLs requiring seat for completion.     3. Impairment of mobility:  Noted to have impaired strength, activity tolerance, functional mobility and balance. Ambulating 60 feet x 2 with FWW and CGA-SBA.       4. Impairment of cognition/language/swallow:  Assess cognition, short term memory deficits, delayed processing and swallow.  Currently DD3 with thin liquids.     5. Medical Conditions- appreciate hospitalist assistance in management   Out of Hospital Cardiac Arrest and Cardiogenic Shock: ROSC  with and therapeutic hypothermia.  Seen and evaluated by electrophysiology with  ICD recommended though deferred at this time in lieu of LUE extravasation/high risk of infection.   - Life Vest in place at all times (except bathing)   - plan for ICD implantation likely 4-6 weeks per EP. They will call patient to follow-up.            Afib: Known Afib w/ RVR from previous hospitalization (5/15-5/23) . She was discharged during previous hospitalization on Amiodarone and was also started on Apixaban. Patient had no evidence of ventriclar arrhythmia prior to discharge. She had 1 DCCV for which she reverted within 24 hrs. Tried and failed:  Sotalol, Metoprolol and Propranolol due to ae.    - Rhythm spontaneously converting from NSR to Afib w/ RVR w/ rates into the 130-140's.   - Continue w/ Digoxin 250 mg daily + Atenolol 50 mg daily.  - If rates sustain > 130 bpm would consider additional dose Atenolol 25 mg PO x 1.      RA: History of seropositive rheumatoid arthritis (anti-CCP positive) since late 1980;s w/ multiple MTP osteotomies, partial right wrist fusion, longstanding therapy consisting of MTX, predisone and HCQ  - Continue with Prednisone taper. Continue 10 mg daily, taper to 7.5 mg on 6/29, then to 5 mg daily after 2 weeks if continues to have low disease activity  - Continue to hold HCQ and MTX until outpatient follow-up  - Follow-up with [facility] clinic Dr. Conor Cunha in 4-6 weeks after discharge      LUE Swelling/Extravasation:  LUE ultrasound negative for DVT.  Noted 5/30, see consult by [facility] dated 6/13 (by SUN Singh) for details Once this heals and is well granulated we will rapidly schedule a device implant.   - Wound care consult and dressing changes as ordered  -continue to monitor    Pancytopenia: 2/2 hypoproliferative bone marrow. Chronic thrombocytopenia Plts noted to decrease from 104 to as low as 15 during recent hospitalization requiring transfusion w/ chronically low in the 's  as outpatient. Anemia Hgb stable 9.2, PLT count up 51. WBC stable 2.3 Seen and evaluated by hematology.   - Continue to hold RA meds   - Continue Apixaban 2.5 mg BID for anticoagulation. This will need to be held 2 days prior to ICD placement.  -cbc Q Mon/Thurs    Steroid/Tube feed induced hyperglycemia- no prior to admission diagnosis of DM, no recent hgb a1C, glucose elevated 140-263, elevation >2 weeks.   -continue to monitor  -ssi  -suspected improvement with tube feed and steroid taper-    Inadequate oral intake-  related to decreased appetite, nausea, meds, and diet order hindering PO intake as evidenced by calorie counts from hospitalization meeting 27% estimated kcal needs and 30% estimated protein needs  - Dysphagia Level 3 diet w/ thin liquids. Straws ok. Pt should be upright, alert, small bites/sips consumed and alternate consistencies.  - Nutrition recommendations are to continue Peptamen 1.5 Tube feeds to 40 ml/hr x 11 hours which will provide 440 ml TF, 660 kcals, and 30 g protein daily. TFs may be adjusted pending oral intake. Once kcal counts reveal that patient is consuming at least 900 kcals and 45 g protein daily on average can discontinue.  - Water flushes as needed.    Dysphagia- continue DD3 with thins  -appreciate ongoing SLP eval     morbilliform eruption - (improved) seen and evaluated by dermatology 6/15- suspected drug eruption.   - punch biopsy performed 6/15- follow results  - continue triamcinolone 0.1% ointment BID to trunk and extremities for pruritus    Resolved Issues   Ischemic Liver Injury Complicated by Coagulopathy:  ESBL UTI:  Hypoxic Respiratory Failure:    CELSA: Stable. SCr 0.7-0.8, peak SCr 1.9.       6. Adjustment to disability:  Mood stable open to talking to - consult placed  7. FEN: DD3 with thin +TF-   8. Bowel: loose stools- monitor  9. Bladder: chronic incontinence-   10. DVT Prophylaxis: Apixiban- dose lowered due to thrombocytopenia  11. GI Prophylaxis:  regular diet consider ppi- given steroids.   12. Code: full  13. Disposition: home  14. ELOS:  7-10 days  15. Rehab prognosis:  Expected improvement in strength, activity tolerance, oral intake with removal of Nasal feeding tube prior to discharge from ARU.   16. Follow up Appointments on Discharge: PCP, Cardiology, Rheumatology, EP        Seen and discussed with Dr. Rubi, PM&R staff physician     Flory TOBIN-TAMMY  Rehab Service  Pager [phone]    Physician Attestation   I, Richy Rubi, saw and evaluated Amelia Michel as part of a shared visit.  I have reviewed and discussed with the advanced practice provider their history, physical and plan.    I personally reviewed the vital signs, medications and labs.    My key history or physical exam findings and Key management decisions made by me:      While there is weakness in broader distribution, the severity isn't necessarily as consistent with critical illness myopathy and may represent more direct deconditioning. Recovery rate may help differentiate (CIM traditionally recovers more slowly). 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 There also seems sensory and weakness left hand in median nerve distribution that may be linked with left forearm tissue necrosis wound. No other significant differences are identified and the patient remains appropriate for an inpatient rehabilitation facility level of care to manage medical issues and address functional impairments.    Comorbid medical conditions being managed: cardiac arrest, presently with life vest defibrillator, anasarca/lymphedema, afib/recent vfib, HTN, RA, undernourishment (getting tube feeds still), left arm vascular access extravasation / necrotic tissue wound.     Prior functional level: fully independent though now weakness requires assist with transfers and ambulation. Focal left hand / index finger weakness on top of more generalized weakness pattern.  Needing assist with some ADL's. Cognition seems to be pretty good.      Will benefit from intensive rehabilitation includin minutes each of PT and OT, Rehabilitation nursing  Close management by physiatry and hospitalist consult    Prognosis: good to discharge home, the rate depends on myopathy vs deconditioning though both have eventual good outcome. Complex medical issues with cardiac arrest will need close management and risk for future event.    Estimated length of stay: 7-10 days       Richy Rubi  Date of Service (when I saw the patient): 17

## 2017-06-16 NOTE — CONSULTS
HOSPITALIST INITIAL CONSULT NOTE         Referring Provider:   Richy Rubi MD    Reason for Consult:   ischemic cardiomyopathy     Summary of Recommendations:    Cardiac arrest with Cardiogenic shock ; Etiology undetermined . Ct life vest till seen in EP clinic.      Afib ; on atenolol and Amiodarone. Also on Apixiban     Pancytopenia ; s/p  Blood transfusion . Hb stable at 8-9 and platelets 30s. Thought to  be due to RA  . Out patient Hematology follow up     Skin Rash : s/p punch biopsy 6/15 by Dermatology / on Triamcilone creme and cetrizine    ESBL UTI treated and also completed treatment for pneumonia    Rheumatoid Arthritis ; on 10 mg prednisone for next 2 weeks and then 7.5 mg for another two weeks and then 5 mg daily there after , till seen in Rheumatology. Currently MTX and HCQ is on hold due to cytopenias    Left Upper extremity Extravasation; due to IV . Ct cares    Critical Illness myopathy ; PMR to address    CELSA : resolved     Severe malnutrition : PMR to address . On dysphagia 3 with think liquids and also Peptamen TF          CC:  56 year old year old female admitted for a chief complaint of deconditioning   HISTORY OF PRESENT ILLNESS    Ms. Michel is a 55 yo F with hx HTN, RA (on MTX, Plaquenil, Prednisone), Afib/Flutter, VSD s/p repair who was admitted on 5/29 to [facility] after out of hospital cardiac arrest with ROSC and s/p therapeutic hypothermia. She went to cath lab on 5/29 which showed normal coronaries. The etiology of the cardiac arrest remains unclear. This cardiac arrest caused multi-organ failure including bone marrow failure, hypoxic respiratory failure requiring mechanical ventilation, and acute kidney injury. She also suffered some degree of anoxic brain injury, though there is minimal clinical evidence of this, and severe critical illness myopathy.  s. She has been treated with Meropenem and Ertapenem for ESBL UTI and aspiration PNA during this hospitalization. Shock liver and CELSA have  resolved. Hgb has required transfusion but now stable 8-9. WBC mildly low with normal neutrophils. She has been treated with stress dose steroids and now steroid tape w/ PO Prednisone w/ history of RA. Pancytopenia w/ Plts as low as 15, now stabilized in the 30-40's w/ previous intermittent transfusions. Plan for ICD s/p cardiac arrest but unable to proceed w/ implantation during this hospitalization due to LUE extravasation and rash (high infectious risk).Plan was to discharge to rehabilitation facility for severe myopathy/deconditioning and continued aggressive rehabilitaiton w/ LifeVest and close follow-up. Plan for involvement of Allied Health Care Teams including: Physical Therapy (>3 months), Occupational Therapy (>3 months), Dietetics/Nutrition (>3 months), and Social Work (>3 months).        Hospital Course by Diagnosis:  # Out of Hospital Cardiac Arrest and Cardiogenic Shock:   - ICD will be deferred at this time in lieu of LUE extravasation/high risk of infection. 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 Plts noted to decrease from 104 to as low as 15 during recent hospitalization requiring transfusion w/ chronically low in the 's as outpatient.  - If this is chronic ITP, no need for treatment unless plts drift < 30s   RA meds on hold on admission   - Continue Apixaban 2.5 mg BID for anticoagulation. This will need to be held 2 days prior to ICD placement.     # Afib: Known Afib w/ RVR from previous hospitalization w/ rates difficult to control despite being on Metoprolol 100 mg XL and Diltiazem. She had 1 DCCV for which she reverted within 24hrs. Sotolol was also attempted but patient did not tolerate the medication due to nausea and vomiting and subsequently failed Metoprolol and Propranolol for the same issue. She was discharged during  previous hospitalization on Amiodarone and was also started on  "Apixaban.  Currently on   - Continue w/ Digoxin 250 mg daily + Atenolol 50 mg daily.  - If rates sustain > 130 bpm would consider additional dose Atenolol 25 mg PO x 1 per dc team .     # RA: History of seropositive rheumatoid arthritis (anti-CCP positive) since late 1980;s w/ multiple MTP osteotomies, partial right wrist fusion, longstanding therapy consisting of MTX, predisone and HCQ  - Continue with Prednisone taper.   - Continue to hold HCQ and MTX until outpatient follow-up  - Follow-up with [facility] Dr. Conor Cunha in 4-6 weeks after discharge     # LUE Swelling/Extravasation:   - LUE ultrasound negative for DVT  - Wound care consult and dressing changes as recommended - follow-up with PCP to be cleared when LUE wound healed.     # Severe Critical Illness Myopathy: Significant deconditioning w/ median neuropathies most likely localized to the wrists and ulnar neuropathies most likely localized to the elbows secondary to RA.  - Ongoing extensive PT/OT/SLP     # Ischemic Liver Injury Complicated by Coagulopathy:  - Feeding via NGT as supplementation to Dysphagia Level II w/ Nectar Thick Diet  - Liver enzymes stable  - No signs of bleeding    # ESBL UTI: Completed course abx.     # Hypoxic Respiratory Failure: s/p abx.  - On room air     # CELSA: Stable. SCr 0.7-0.8, peak SCr 1.9.     # Malnutrition:   - Dysphagia Level 3 diet w/ thin liquids. Straws ok    On exam ;   Vital signs:  Temp: 96.9  F (36.1  C) Temp src: Oral BP: 114/54 Pulse: 83   Resp: 18 SpO2: 97 %     Height: 147.3 cm (4' 10\") Weight: 72.6 kg (160 lb)  Estimated body mass index is 33.44 kg/(m^2) as calculated from the following:    Height as of this encounter: 1.473 m (4' 10\").    Weight as of this encounter: 72.6 kg (160 lb).        General: Alert, interactive, generally well appearing, NAD  Eyes: Sclera anicteric, EOMI  Neck: Supple, no lymphadenopathy, no JVD noted  Cardiovascular: s1 and s2.  Resp: Lungs clear but diminished " to RLL, no rales, rhonchi, or wheezes noted  GI: Soft, nontender, nondistended. +BS.  No HSM or masses, no rebound or guarding.  Extremities: LE edema, no cyanosis  Skin: Multiple pink macules over the proximal upper and lower extremities, there is black hemorrhagic crust over mid chest from resuscitation, pink macules that coalesce into a large patch on the upper back, upper chest, and abdomen, no oral ulcers, no facial swelling, no conjunctival injection     Neuro: CN 2-12 intact, moves all extremities equally  Psych: Alert & oriented x 3     Past Medical History:   Diagnosis Date     Hypertension      Rheumatoid arthritis(714.0)      Past Surgical History:   Procedure Laterality Date     ANESTHESIA CARDIOVERSION N/A 5/17/2017    Procedure: ANESTHESIA CARDIOVERSION;  ANESTHESIA CARDIOVERSION;  Surgeon: GENERIC ANESTHESIA PROVIDER;  Location: [facility] OR     ARTHRODESIS WRIST  2000    Right wrist     FOOT SURGERY      4 left and 2 right     RELEASE CARPAL TUNNEL BILATERAL       REPAIR VENTRICULAR SEPTAL DEFECT  1969     Family History   Problem Relation Age of Onset     Breast Cancer Mother      Hypertension Mother      Alzheimer Disease Mother      Hypertension Father      Blood Disease Father      Lymphoa     Circulatory Father      A Fib     DIABETES Paternal Grandmother      Social History     Social History     Marital status:      Spouse name: Romeo Michel     Number of children: 0     Years of education: N/A     Occupational History      [employer]     Social History Main Topics     Smoking status: Never Smoker     Smokeless tobacco: Never Used     Alcohol use Yes      Comment: Glass of wine two evenings a night     Drug use: No     Sexual activity: Not on file     Other Topics Concern     Parent/Sibling W/ Cabg, Mi Or Angioplasty Before 65f 55m? No     Social History Narrative     Allergies   Allergen Reactions     Penicillins      Tape [Adhesive Tape] Rash       Current  Facility-Administered Medications   Medication     acetaminophen (TYLENOL) tablet 650 mg     apixaban ANTICOAGULANT (ELIQUIS) tablet 2.5 mg     atenolol (TENORMIN) tablet 50 mg     Calcium Carb-Cholecalciferol 600-800 MG-UNIT TABS 2 tablet     cetirizine (zyrTEC) tablet 10 mg     CoQ-10 CAPS     digoxin (LANOXIN) tablet 250 mcg     folic acid (FOLVITE) tablet 1 mg     gabapentin (NEURONTIN) capsule 200 mg     melatonin tablet 3 mg     WOMENS MULTIVITAMIN TABS     predniSONE (DELTASONE) tablet 10 mg     senna-docusate (SENOKOT-S;PERICOLACE) 8.6-50 MG per tablet 1-2 tablet     traMADol (ULTRAM) half-tab 50 mg     triamcinolone (KENALOG) 0.1 % ointment     [START ON 7/1/2017] predniSONE (DELTASONE) tablet 7.5 mg     [START ON 7/15/2017] predniSONE (DELTASONE) tablet 5 mg     Patient is already receiving anticoagulation with heparin, enoxaparin (LOVENOX), warfarin (COUMADIN)  or other anticoagulant medication       REVIEW OF SYSTEMS  A 10 point review of systems was taken and  negative except for that which was stated above in history of present illness.

## 2017-06-16 NOTE — PLAN OF CARE
Problem: Goal Outcome Summary  Goal: Goal Outcome Summary  Monitor Aflutter, rate controlled, VSS. TF of Peptamen 1.5 @ 40cc/hr via NJ. Pt has been incontinent of large amounts of urine since brand dc'd. UO=677 and 142, covered per SSI with 1unit novolog each time. Life vest placed on pt last evening.  Has appeared to sleep well overnight. Possible transfer to  ARU today. Notify MD with any issues or concerns.  Pt ZM=279r after getting up for wt. Pt requesting AM cardiac meds to be given now. Atenolol and digoxin given as requested.

## 2017-06-16 NOTE — PROGRESS NOTES
CLINICAL NUTRITION SERVICES - ASSESSMENT NOTE     Nutrition Prescription    RECOMMENDATIONS FOR MDs/PROVIDERS TO ORDER:  None at this time.     Malnutrition Status:    Patient does not meet two of the criteria necessary for diagnosing malnutrition    Recommendations already ordered by Registered Dietitian (RD):  1. Ordered Calorie Counts to assess need to wean/adjust TF   2. Ordered to initate EN via ND tube of Peptamen 1.5 @ 50 mL/hr x 20 hours (4pm-12pm) to provide 1000 mL, 1500 kcals (29 kcal/kg), 68 g pro (1.3 g/kg), 184 g CHO, 0 gm fiber, and 770 mL free water to meet 100% estimated kcal and pro needs.   3. Ordered TF flush 60 mL q 2 hrs (per hospital regimen). Monitor hydration status and adjusted as needed.   4. Ordered Ensure Clear (berry) to be sent up 1x for pt to try   5. Ordered Beneprotein + milk with breakfast meal      Future/Additional Recommendations:  1. If above EN regimen tolerated, recommend further cycling to 60 mL x 16 hrs with eventual cycling goal of 80 mL q 12 hrs to meet 100% assessed needs.   2. Pt to consume at least 1000 kcals and 45 g pro to D/C tube feeds.   3. Diet as per SLP. Rec keep diet as liberalized as able to encourage oral intake. Encourage intake of oral supplements and high kcals/protein options.   4. Continue multivitamin with minerals as ordered to ensure micronutrient needs are met.   5. Continue calcium/vitamin D as ordered. Pt on prednisone.      REASON FOR ASSESSMENT  Amelia Michel is a/an 56 year old female assessed by the dietitian for Provider Order - Registered Dietitian to Assess and Order TF per Medical Nutrition Therapy Protocol    NUTRITION HISTORY  Per past RD note 6/13 during hospitalization, TF regimen: Pt was receiving Peptamen 1.5 at goal rate 40 ml/hr (960 ml/day) provides 1440 kcals (30 kcal/kg), 65 g PRO (1.4 g/kg), 739 ml free H2O, 54 g Fat (70% from MCTs), 180 g CHO and no Fiber daily to meet 100% of her calorie and protein needs. However, RD  recommended to decrease TF to Peptamen 1.5 @ 40 mL/hr x 11 hrs on 6/13 to provide 440 mL TF, 660 kcals, and 30 g pro daily to meet 52% estimated kcal needs and ~50% estimated protein needs, HOWEVER, this change was not reflected in her previous TF orders from the hospital. Feeding Tube Flushes:  60 mL Q 2 hrs.   CALORIE COUNTS VIA HOSPITAL RECORDS:                          6/8     70 kcals and 1 g protein (one meal/s and no supplement/s recorded)                         6/9    230 kcals and 15 g protein (meal/s and no supplement/s recorded)                         6/10  395 kcals and 19 g protein (meal/s and no supplement/s recorded)                         6/11  444 kcals and 12 g protein (meal/s and no supplement/s recorded)   `            6/14: 0 kcals and 0 g protein (no intakes recorded of 2 meals)              6/15: 323 kcals, 27 g protein    Average: 348 kcals, 18 g protein to meet 27% estimated kcal needs and 30% estimated protein needs.     Per discussion with pt, she has been experiencing poor PO intakes 2/2 nausea, decreased appetite, meds, and diet order affecting desire to eat. She reports she likes cold foods better as she is frequently hot and hot foods worsen this. She estimates her appetite is improving 2/2 steroids and reports she was trying to consume 3 meals/day in the hospital, but at times it was difficult when she was made NPO. She reports she was consuming milk + beneprotein daily with breakfast, but did not like the supplements of Magic Cup and Gelatein. She reports she has been tolerating TF with the exception of experiencing diarrhea, however, pt was receiving bowel medications (senna) at the time. She reports she has been on continuous TF x 24 hrs a day. She reported the team in the hospital discussed potentially cycling feeds prior to discharge from the hospital, but reports her feeds were never cycled and continued to receive TF 24 hrs a day up until admission to ARU.     CURRENT  "NUTRITION ORDERS  Diet: Dysphagia Diet Level 3 + Thin Liquids   Intake/Tolerance: Pt just arrived to ARU, no meals recorded yet. However upon interviewing pt, she had received a meal tray and ate 100% of her meal. TF not yet restarted.     LABS  Labs reviewed    MEDICATIONS  Medications reviewed  Calcium + vit D (on steroids)   Folic Acid     ANTHROPOMETRICS  Height: 148 cm (4' 10.25\")   Most Recent Weight: 72.8 kg (160# 8oz) per hospital weight on 6/16  IBW: 43.2 kg (95#)   BMI: Obesity Grade I BMI 30-34.9  Weight History: Weight appears to be trending up over the past few months per review of previous wt records and per weight records below during hospitalization is up 14% from her admission wt of 63.5 kg.   06/16/17 0525 72.8 kg (160 lb 8 oz) JN      06/14/17 0651 71.4 kg (157 lb 6.4 oz) RJ     06/14/17 0500 72 kg (158 lb 11.7 oz) AC     06/13/17 0623 71.3 kg (157 lb 1.6 oz) RJ     06/12/17 0623 77.3 kg (170 lb 8 oz)      06/11/17 0340 80.5 kg (177 lb 8 oz) MK     06/10/17 0657 82.3 kg (181 lb 8 oz)      06/09/17 0330 83 kg (183 lb) MB     06/08/17 0547 84.3 kg (185 lb 14.4 oz) CW     06/07/17 0000 77.4 kg (170 lb 10.2 oz) NK     06/06/17 0400 77.2 kg (170 lb 3.1 oz) CK     06/05/17 0400 74.5 kg (164 lb 3.9 oz) CK     06/04/17 0400 76.5 kg (168 lb 10.4 oz) CK     06/03/17 0400 75.4 kg (166 lb 3.6 oz) CK     06/02/17 0100 73.4 kg (161 lb 13.1 oz) HN     06/01/17 0400 73 kg (160 lb 15 oz) KS     05/31/17 0400 70.3 kg (154 lb 15.7 oz) KB     05/30/17 0400 69.5 kg (153 lb 3.5 oz) KS     05/29/17 0704 63.5 kg (140 lb) HÉCTOR                Dosing Weight: 51 kg - adjusted per obesity based on current wt and IBW     ASSESSED NUTRITION NEEDS  Estimated Energy Needs: 5312-9536 kcals/day (25 - 30 kcals/kg)  Justification: Maintenance  Estimated Protein Needs: 61-77 grams protein/day (1.2 - 1.5 grams of pro/kg)  Justification: Increased needs with critical illness  Estimated Fluid Needs: 1 mL/kcal   Justification: " Maintenance    PHYSICAL FINDINGS  See malnutrition section below.  LE edema     MALNUTRITION  % Intake: No decreased intake noted (TF supplemented PO intake)  % Weight Loss: None noted  Subcutaneous Fat Loss: None observed  Muscle Loss: None observed  Fluid Accumulation/Edema: LE edema   Malnutrition Diagnosis: Patient does not meet two of the above criteria necessary for diagnosing malnutrition    NUTRITION DIAGNOSIS  Inadequate oral intake related to decreased appetite, nausea, meds, and diet order hindering PO intake as evidenced by calorie counts from hospitalization meeting 27% estimated kcal needs and 30% estimated protein needs.    INTERVENTIONS  Implementation  Nutrition Education: Provided education on POC regarding TF and PO intake. Pt reported the team at the hospital never cycled her TF and it has been running continuous 24 hrs, but has been tolerating current TF and desires to cycle feeds to allow for time off in hopes of stimulating her appetite. Per discussion with pt, she estimates she can tolerate increases in TF rate and wants to try cycling. Discussed PO intake, she estimates her intakes are improving and open to trying Ensure Clear supplements. Encouraged small, frequent meals. Discussed restarting calorie counts during ARU stay to determine changes and ability to wean TF.   Enteral Nutrition - Initiate per above   TF flushes - per above   Medical food supplement therapy - per above   Calorie Counts 6/16-6/19    Goals  1. Total avg nutritional intake (PO + EN) to meet a minimum of 25 kcal/kg and 1.2 g PRO/kg daily (per dosing wt 51 kg).  2. Pt to consume at least 1000 kcals and 45 gm protein via PO to D/C TF.      Monitoring/Evaluation  Progress toward goals will be monitored and evaluated per protocol.    Irasema Shelley, Registration Eligible  Unit Pager: [phone]    I reviewed and agree with above.  Yumiko Monroe, RD, LD

## 2017-06-16 NOTE — PLAN OF CARE
Problem: Goal Outcome Summary  Goal: Goal Outcome Summary  Occupational Therapy Discharge Summary     Reason for therapy discharge:    Discharged to acute rehabilitation facility.     Progress towards therapy goal(s). See goals on Care Plan in [facility] record for goal details.  Goals partially met.  Barriers to achieving goals:   discharge from facility.     Therapy recommendation(s):    Continued therapy is recommended.  Rationale/Recommendations:  continued OT at ARU to increase pt's (I) and functional endurance for ADL/IADLs.

## 2017-06-16 NOTE — PROGRESS NOTES
Calorie Counts  Intake recorded for: 6/15 Kcals: 323  Protein: 27g  # Meals Recorded: 100% cottage cheese, 50% tuna salad sandwich, green beans, soft fruit plate   # Supplements Recorded: 0

## 2017-06-16 NOTE — PROGRESS NOTES
[facility] Inpatient Progress Dermatology Note    Impression/Plan:  This is a 56 year old female with PMH of RA, VSD s/p repair, afib/aflutter, HTN, insomnia who presents with cardiac arrest with ROSC who developed new morbilliform rash 3 days ago. Preliminary biopsy results today suggestive of drug eruption. It appears that the only medication that was started within the last 5-14 days since onset of rash was digoxin. At this point, eruption is mild and is improved with topical steroids. Would continue topical treatment until rash resolves.   - continue triamcinolone 0.1% ointment BID to trunk and extremities for pruritus  - follow cbc with diff to look for eosinophilia and daily LFTs until discharge  - no need for follow up with dermatology unless rash fails to improve or worsens      Dermatology will sign off at this time. Please do not hesitate to contact the dermatology resident/faculty on call for any additional questions or concerns.     Kelly Kee MD  PGY-2, Dermatology      Dermatology Problem List:  1. Morbiliform Drug eruption, biopsy on 6/15/17     Date of Admission: Reji 15, 2017   Encounter Date: 6/16/2017     Interval History:   Patient reports feeling well. She notes continued mild to moderate itching that improves with the steroid medication. Per nursing they are applying this over the rash twice daily. No new areas of rash. No fevers or chills. [place].    Past Medical History:   Patient Active Problem List   Diagnosis     HTN (hypertension)     Primary pulmonary hypertension (H)     Hyperlipidemia LDL goal <130     Other rheumatoid arthritis with rheumatoid factor of multiple sites (H)     Lymphedema of both lower extremities     Atrial tachycardia (H)     Cardiogenic shock (H)     Acute respiratory failure with hypoxia (H)     Hypertension     Critical illness myopathy     Past Medical History:   Diagnosis Date     Hypertension      Rheumatoid  arthritis(714.0)      Past Surgical History:   Procedure Laterality Date     ANESTHESIA CARDIOVERSION N/A 5/17/2017    Procedure: ANESTHESIA CARDIOVERSION;  ANESTHESIA CARDIOVERSION;  Surgeon: GENERIC ANESTHESIA PROVIDER;  Location: [facility] OR     ARTHRODESIS WRIST  2000    Right wrist     FOOT SURGERY      4 left and 2 right     RELEASE CARPAL TUNNEL BILATERAL       REPAIR VENTRICULAR SEPTAL DEFECT  1969       Medications:  Current Facility-Administered Medications   Medication     acetaminophen (TYLENOL) tablet 650 mg     apixaban ANTICOAGULANT (ELIQUIS) tablet 2.5 mg     [START ON 6/17/2017] atenolol (TENORMIN) tablet 50 mg     [START ON 6/17/2017] calcium-vitamin D (CALTRATE) 600-400 MG-UNIT per tablet 2 tablet     [START ON 6/17/2017] cetirizine (zyrTEC) tablet 10 mg     [START ON 6/17/2017] co-enzyme Q-10 capsule 30 mg     [START ON 6/17/2017] digoxin (LANOXIN) tablet 250 mcg     [START ON 6/17/2017] folic acid (FOLVITE) tablet 1 mg     gabapentin (NEURONTIN) capsule 200 mg     melatonin tablet 3 mg     [START ON 6/17/2017] multivitamin, therapeutic with minerals (THERA-VIT-M) tablet 1 tablet     [START ON 6/17/2017] predniSONE (DELTASONE) tablet 10 mg     senna-docusate (SENOKOT-S;PERICOLACE) 8.6-50 MG per tablet 1-2 tablet     traMADol (ULTRAM) half-tab 50 mg     triamcinolone (KENALOG) 0.1 % ointment     [START ON 7/1/2017] predniSONE (DELTASONE) tablet 7.5 mg     [START ON 7/15/2017] predniSONE (DELTASONE) tablet 5 mg     Patient is already receiving anticoagulation with heparin, enoxaparin (LOVENOX), warfarin (COUMADIN)  or other anticoagulant medication     naloxone (NARCAN) injection 0.1-0.4 mg     glucose 40 % gel 15-30 g    Or     dextrose 50 % injection 25-50 mL    Or     glucagon injection 1 mg     insulin aspart (NovoLOG) inj (RAPID ACTING)     insulin aspart (NovoLOG) inj (RAPID ACTING)     dextrose 10 % 1,000 mL infusion          Allergies   Allergen Reactions     Penicillins      Tape [Adhesive Tape]  "Rash       Physical exam:  Vitals: /54 (BP Location: Right arm)  Pulse 83  Temp 96.9  F (36.1  C) (Oral)  Resp 18  Ht 1.473 m (4' 10\")  Wt 72.6 kg (160 lb)  SpO2 97%  BMI 33.44 kg/m2  GEN: This is a well developed, well-nourished female in no acute distress, in a pleasant mood.    SKIN: Total skin excluding the back, and undergarment areas was performed. The exam included the head/face, neck, both arms, chest, abdomen, both legs, digits and/or nails.   - there are multiple pink macules over the proximal upper and lower extremities   - there is black hemorrhagic crust over mid chest from resuscitation  - there are pink macules that coalesce into a large patch on the upper chest, and abdomen  - no oral ulcers, no facial swelling, no conjunctival injection    Laboratory:  Lab Results   Component Value Date    WBC 2.3 06/16/2017     Lab Results   Component Value Date    RBC 2.90 06/16/2017     Lab Results   Component Value Date    HGB 9.2 06/16/2017     Lab Results   Component Value Date    HCT 27.9 06/16/2017     No components found for: MCT  Lab Results   Component Value Date    MCV 96 06/16/2017     Lab Results   Component Value Date    MCH 31.7 06/16/2017     Lab Results   Component Value Date    MCHC 33.0 06/16/2017     Lab Results   Component Value Date    RDW 20.1 06/16/2017     Lab Results   Component Value Date    PLT 51 06/16/2017     Last Basic Metabolic Panel:  Lab Results   Component Value Date     06/16/2017      Lab Results   Component Value Date    POTASSIUM 4.0 06/16/2017     Lab Results   Component Value Date    CHLORIDE 103 06/16/2017     Lab Results   Component Value Date    VIKTORIYA 7.4 06/16/2017     Lab Results   Component Value Date    CO2 28 06/16/2017     Lab Results   Component Value Date    BUN 27 06/16/2017     Lab Results   Component Value Date    CR 0.66 06/16/2017     Lab Results   Component Value Date     06/16/2017     Lab Results   Component Value Date    AST 34 " 06/16/2017     Lab Results   Component Value Date    ALT 60 06/16/2017     Lab Results   Component Value Date    BILICONJ 0.0 12/06/2005      Lab Results   Component Value Date    BILITOTAL 0.8 06/16/2017     Lab Results   Component Value Date    ALBUMIN 1.8 06/16/2017     Lab Results   Component Value Date    PROTTOTAL 4.6 06/16/2017      Lab Results   Component Value Date    ALKPHOS 159 06/16/2017       Discussed the patient with Dr. Cartagena.      Staff Involved:  Resident(Kelly Kee)/Staff(as above)

## 2017-06-16 NOTE — PLAN OF CARE
Problem: Goal Outcome Summary  Goal: Goal Outcome Summary     SLP: Pt discharged to ARU prior to session this date. Pt recently advanced to dysphagia diet level 3 and thin liquids on 6/15. Recommended pt sit upright, alert and take small sips/bites while alternating consistencies. Straws ok.  RN notes indicated tolerating well. Recommend ongoing SLP for dysphagia management and assist with diet advancement recommendations. Do not anticipate need for cognitive-linguistic evaluation, though would benefit from screen given recent hospitalization.      Speech Language Therapy Discharge Summary     Reason for therapy discharge:    Discharged to acute rehabilitation facility.     Progress towards therapy goal(s). See goals on Care Plan in [facility] electronic health record for goal details.  Goals partially met.  Barriers to achieving goals:   discharge from facility.     Therapy recommendation(s):    Continued therapy is recommended.  Rationale/Recommendations:  see above.

## 2017-06-16 NOTE — PROGRESS NOTES
SW met with pt regarding [facility] paperwork.  SW discussed faxing to [employer] which Pt wanted SW to do.  Fax was sent and original was given to pt and placed in a labeled envelope.  Pt is aware of d/c to ARU today at 11:00 and she states she is familiar with the setting, feeling hopeful.  Sameera Chatman

## 2017-06-16 NOTE — DISCHARGE SUMMARY
[address] [address]  p: [phone]    Discharge Summary: Cardiology Service    Amelia Michel MRN# [id number]   YOB: 1960 Age: 56 year old       Admission Date: 5/29/2017  Discharge Date: 06/15/17      Discharge Diagnoses:  1. Cardiac Arrest/Cardiogenic Shock  2. Acute Respiratory Failure w/ Hypoxia  3. Pancytopenia  4. Rheumatoid Arthritis  5. Hypertension  6. Hyperlipidemia  7. Atrial Fibrillation    Pertinent Procedures:  1. NA    Consults:  Cardiac Electrophysiology: to evaluate atrial fibrillation and etiology of cardiac arrest (lifelong duration)  Hematology: To determine the etiology of critically low levels of hemoglobin, white blood cell, and platelet counts (duration limited to hospitalization)  Intensive Care: For management of hypoxic respiratory failure (duration limited to hospitalization)  Neurology: To determine whether anoxic brain injury exists (limited to hospitalization)  Rheumatology: To evaluate management of rheumatoid arthritis  (lifelong duration)  Dermatology: To evaluate and treat new onset rash (interval but may need post-hospital follow-up)    Imaging with results:  Echocardiogram 5/29/17: Normal LV size and wall thickness. EF 40-45% w/ indeterminate LV diastolic function. No regional wall motion abnormalities. RV is normal in size w/ mildly reduced global function. Both atrial appear normal. Mitral valve is normal. Aortic valve is normal in structure and function. Moderate TR present. RVSP 34.4 mmHg.   Repeat Limited Echo 6/5/17: Borderline EF 50-55 % reduced LV function.     Coronary Angiogram 5/29/17:  No evidence of coronary artery disease. Cardiac arrest from unclear etiology. Cooling catheter insertion.    Other imaging studies:  CXR 6/8/17: 1. Cardiomegaly with indistinct pulmonary vasculature and perihilar opacities suggestive of pulmonary edema.  2. Bilateral pleural effusions, increased  on the right with overlying bibasilar atelectasis/consolidation.  MRI Brain 6/9/17: Impression: No evidence of anoxic brain injury.  MRI Spine 6/9/17: 1. No significant change since 5/17/2016. No evidence of spinal cord injury.  2. Stable degenerative anterolisthesis of C4 on C5 and C7 on T1. No fracture or traumatic subluxation.  3. 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  4. Stable multilevel cervical spondylosis.  LUE Ultrasound 6/14/17: No evidence of left upper extremity deep venous thrombosis.    Brief HPI:  Ms. Michel is a 55 yo F with hx HTN, RA (on MTX, Plaquenil, Prednisone), Afib/Flutter, VSD s/p repair who was admitted on 5/29 after out of hospital cardiac arrest with ROSC and s/p therapeutic hypothermia. She went to cath lab on 5/29 which showed normal coronaries. The etiology of the cardiac arrest remains unclear. This cardiac arrest caused multi-organ failure including bone marrow failure, hypoxic respiratory failure requiring mechanical ventilation, and acute kidney injury. She also suffered some degree of anoxic brain injury, though there is minimal clinical evidence of this, and severe critical illness myopathy. All of these required a stay in the intensive care unit at the [facility] and a lengthy hospitalization afterwards. She has been treated with Meropenem and Ertapenem for ESBL UTI and aspiration PNA during this hospitalization. Shock liver and CELSA have resolved. Hgb has required transfusion but now stable 8-9. WBC mildly low with normal neutrophils. She has been treated with stress dose steroids and now steroid tape w/ PO Prednisone w/ history of RA. Pancytopenia w/ Plts as low as 15, now stabilized in the 30-40's w/ previous intermittent transfusions. Plan for ICD s/p cardiac arrest but unable to proceed w/ implantation during this hospitalization due to LUE extravasation and rash (high infectious risk). Will plan to discharge to rehabilitation facility for severe  myopathy/deconditioning and continued aggressive rehabilitaiton w/ [facility] and close follow-up. Plan for involvement of Allied Health Care Teams including: Physical Therapy (>3 months), Occupational Therapy (>3 months), Dietetics/Nutrition (>3 months), and Social Work (>3 months).      Hospital Course by Diagnosis:  # Out of Hospital Cardiac Arrest and Cardiogenic Shock:   - ICD will be deferred at this time in lieu of LUE extravasation/high risk of infection. Discharge to rehab facility with [facility] until medically appropriate and cleared from infection/wound standpoint for ICD implantation likely 4-6 weeks per EP. They will call patient to follow-up.     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 Plts noted to decrease from 104 to as low as 15 during recent hospitalization requiring transfusion w/ chronically low in the 's as outpatient.  - If this is chronic ITP, no need for treatment unless plts drift < 30s  - Continue to hold RA meds for now  - Continue Apixaban 2.5 mg BID for anticoagulation. This will need to be held 2 days prior to ICD placement.    # Afib: Known Afib w/ RVR from previous hospitalization w/ rates difficult to control despite being on Metoprolol 100 mg XL and Diltiazem. She had 1 DCCV for which she reverted within 24hrs. Sotolol was also attempted but patient did not tolerate the medication due to nausea and vomiting and subsequently failed Metoprolol and Propranolol for the same issue. She was discharged during this previous hospitalization on Amiodarone and was also started on Apixaban. Patient had no evidence of ventriclar arrhythmia prior to discharge. RFA was discuss but was defered as patient had a UTI at the time with ESBL. Given difficulty to control rates, an CMR was performed to r/o inflammation or scar as cause of arrhythmia. CMR did not show atrial inflammation or abnormalities except for enlargement. Patient did have RVOT dysfunction which may  have been 2/2 prior VSD repair.   - Rhythm spontaneously converting from NSR to Afib w/ RVR w/ rates into the 130-140's.   - Continue w/ Digoxin 250 mg daily + Atenolol 50 mg daily.  - If rates sustain > 130 bpm would consider additional dose Atenolol 25 mg PO x 1.     # RA: History of seropositive rheumatoid arthritis (anti-CCP positive) since late 1980;s w/ multiple MTP osteotomies, partial right wrist fusion, longstanding therapy consisting of MTX, predisone and HCQ  - Continue with Prednisone taper. Now on 10 mg daily, taper to 7.5 mg on 6/29, then to 5 mg daily after 2 weeks if continues to have low disease activity  - Continue to hold HCQ and MTX until outpatient follow-up  - Follow-up with [facility] Rheumatology clinic Dr. Conor Cunha in 4-6 weeks after discharge    # LUE Swelling/Extravasation:   - LUE ultrasound negative for DVT  - Wound care consult and dressing changes as recommended - follow-up with PCP to be cleared when LUE wound healed.    # Severe Critical Illness Myopathy: Significant deconditioning w/ median neuropathies most likely localized to the wrists and ulnar neuropathies most likely localized to the elbows secondary to RA.  - Ongoing extensive PT/OT/SLP    # Ischemic Liver Injury Complicated by Coagulopathy:  - Feeding via NGT as supplementation to Dysphagia Level II w/ Nectar Thick Diet  - Liver enzymes stable  - No signs of bleeding    # ESBL UTI: Completed course abx.    # Hypoxic Respiratory Failure: s/p abx.  - On room air    # CELSA: Stable. SCr 0.7-0.8, peak SCr 1.9.    # Malnutrition:   - Dysphagia Level 3 diet w/ thin liquids. Straws ok. Pt should be upright, alert, small bites/sips consumed and alternate consistencies.  - Nutrition recommendations are to continue Peptamen 1.5 Tube feeds to 40 ml/hr x 11 hours which will provide 440 ml TF, 660 kcals, and 30 g protein daily. TFs may be adjusted pending oral intake. Once kcal counts reveal that patient is consuming at least 900 kcals and  45 g protein daily on average can discontinue.  - Water flushes as needed.                  Condition on discharge  Temp:  [97.7  F (36.5  C)-98.5  F (36.9  C)] 98.2  F (36.8  C)  Heart Rate:  [73-87] 76  Resp:  [16-18] 18  BP: ()/(63-72) 127/68  SpO2:  [95 %-99 %] 97 %  General: Alert, interactive, generally well appearing, NAD  Eyes: Sclera anicteric, EOMI  Neck: Supple, no lymphadenopathy, no JVD noted  Cardiovascular: Regular rate and rhythm, normal S1 and S2, no murmurs, gallops, or rubs  Resp: Lungs clear but diminished to RLL, no rales, rhonchi, or wheezes noted  GI: Soft, nontender, nondistended. +BS.  No HSM or masses, no rebound or guarding.  Extremities: LE edema, no cyanosis or clubbing, dorsalis pedis and posterior tibialis pulses 2+ bilaterally - leg wraps bilaterally (off this am and pulses present)  Skin: Multiple pink macules over the proximal upper and lower extremities, there is black hemorrhagic crust over mid chest from resuscitation, pink macules that coalesce into a large patch on the upper back, upper chest, and abdomen, no oral ulcers, no facial swelling, no conjunctival injection       Neuro: CN 2-12 intact, moves all extremities equally  Psych: Alert & oriented x 3      Discharge medications:   Current Discharge Medication List      START taking these medications    Details   melatonin 3 MG tablet Take 1 tablet (3 mg) by mouth nightly as needed for sleep    Associated Diagnoses: Insomnia, unspecified type      acetaminophen (TYLENOL) 325 MG tablet Take 2 tablets (650 mg) by mouth every 4 hours as needed for mild pain  Qty: 100 tablet    Associated Diagnoses: Rheumatoid arthritis involving multiple sites with positive rheumatoid factor (H)      cetirizine (ZYRTEC) 10 MG tablet Take 1 tablet (10 mg) by mouth daily  Qty: 30 tablet    Associated Diagnoses: Rash and nonspecific skin eruption      atenolol (TENORMIN) 50 MG tablet Take 1 tablet (50 mg) by mouth daily  Qty: 30 tablet     Associated Diagnoses: Paroxysmal atrial fibrillation (H); Atrial tachycardia (H); Other secondary hypertension      digoxin (LANOXIN) 250 MCG tablet Take 1 tablet (250 mcg) by mouth daily  Qty: 30 tablet    Associated Diagnoses: Atrial tachycardia (H); Paroxysmal atrial fibrillation (H)      triamcinolone (KENALOG) 0.1 % ointment Apply topically 2 times daily    Associated Diagnoses: Rash and nonspecific skin eruption      senna-docusate (SENOKOT-S;PERICOLACE) 8.6-50 MG per tablet Take 1-2 tablets by mouth 2 times daily  Qty: 100 tablet    Associated Diagnoses: Constipation, unspecified constipation type         CONTINUE these medications which have CHANGED    Details   apixaban ANTICOAGULANT (ELIQUIS) 2.5 MG tablet Take 1 tablet (2.5 mg) by mouth 2 times daily    Associated Diagnoses: Paroxysmal atrial fibrillation (H)      predniSONE (DELTASONE) 10 MG tablet Take 1 tablet (10 mg) by mouth daily    Comments: Taper 10 mg daily x 2 weeks, then 7.5 mg daily x 2 weeks, then 5 mg daily.  Associated Diagnoses: Rheumatoid arthritis involving multiple sites with positive rheumatoid factor (H)         CONTINUE these medications which have NOT CHANGED    Details   Calcium Carb-Cholecalciferol 600-800 MG-UNIT TABS Take 2 tablets by mouth every morning      Multiple Vitamins-Minerals (WOMENS MULTIVITAMIN PO) Take 1 tablet by mouth daily At bedtime      Coenzyme Q10 (COQ-10 PO) Take 1 tablet by mouth daily In the morning      gabapentin (NEURONTIN) 100 MG capsule Take 2 capsules (200 mg) by mouth 3 times daily  Qty: 180 capsule, Refills: 3      traMADol (ULTRAM) 50 MG tablet Take 50 mg by mouth Every 6 hours as needed for pain      folic acid (FOLVITE) 1 MG tablet Take 1 mg by mouth daily.         STOP taking these medications       metoprolol (TOPROL-XL) 100 MG 24 hr tablet Comments:   Reason for Stopping:         nitrofurantoin, macrocrystal-monohydrate, (MACROBID) 100 MG capsule Comments:   Reason for Stopping:          amiodarone 400 MG TABS Comments:   Reason for Stopping:         amiodarone (PACERONE/CODARONE) 200 MG tablet Comments:   Reason for Stopping:         diltiazem 120 MG 24 hr capsule Comments:   Reason for Stopping:         pramipexole (MIRAPEX) 0.25 MG tablet Comments:   Reason for Stopping:         diphenoxylate-atropine (LOMOTIL) 2.5-0.025 MG per tablet Comments:   Reason for Stopping:         hydroxychloroquine (PLAQUENIL) 200 MG tablet Comments:   Reason for Stopping:         leflunomide (ARAVA) 20 MG tablet Comments:   Reason for Stopping:         METHOTREXate 2.5 MG tablet Comments:   Reason for Stopping:               Follow-up:  Follow-up with PCP in 1 week for hospital follow-up. Please follow-up with Cardiology, Electrophysiology, Rheumatology as directed.     Code status:  Full    Pt was seen by, and discharge plan discussed with Dr. Arvind Gilbert, ELIZABETH CNP  Cardiovascular Disease NP  [phone]    Patient Care Team:  Comfort Sabillon MD as PCP - General (Family Practice)  Sandy Michele MD as MD (Cardiology)

## 2017-06-17 LAB
COPATH REPORT: NORMAL
GLUCOSE BLDC GLUCOMTR-MCNC: 183 MG/DL (ref 70–99)
GLUCOSE BLDC GLUCOMTR-MCNC: 184 MG/DL (ref 70–99)
GLUCOSE BLDC GLUCOMTR-MCNC: 185 MG/DL (ref 70–99)
GLUCOSE BLDC GLUCOMTR-MCNC: 219 MG/DL (ref 70–99)

## 2017-06-17 PROCEDURE — 25000132 ZZH RX MED GY IP 250 OP 250 PS 637: Performed by: PHYSICAL MEDICINE & REHABILITATION

## 2017-06-17 PROCEDURE — 12800006 ZZH R&B REHAB

## 2017-06-17 PROCEDURE — 97116 GAIT TRAINING THERAPY: CPT | Mod: GP | Performed by: PHYSICAL THERAPIST

## 2017-06-17 PROCEDURE — 40000133 ZZH STATISTIC OT WARD VISIT

## 2017-06-17 PROCEDURE — 97535 SELF CARE MNGMENT TRAINING: CPT | Mod: GO

## 2017-06-17 PROCEDURE — 40000193 ZZH STATISTIC PT WARD VISIT: Performed by: PHYSICAL THERAPIST

## 2017-06-17 PROCEDURE — 97110 THERAPEUTIC EXERCISES: CPT | Mod: GP | Performed by: PHYSICAL THERAPIST

## 2017-06-17 PROCEDURE — 99232 SBSQ HOSP IP/OBS MODERATE 35: CPT | Performed by: INTERNAL MEDICINE

## 2017-06-17 PROCEDURE — 25000132 ZZH RX MED GY IP 250 OP 250 PS 637: Performed by: INTERNAL MEDICINE

## 2017-06-17 PROCEDURE — 27210437 ZZH NUTRITION PRODUCT SEMIELEM INTERMED LITER

## 2017-06-17 PROCEDURE — 97530 THERAPEUTIC ACTIVITIES: CPT | Mod: GP | Performed by: PHYSICAL THERAPIST

## 2017-06-17 PROCEDURE — 40000225 ZZH STATISTIC SLP WARD VISIT: Performed by: SPEECH-LANGUAGE PATHOLOGIST

## 2017-06-17 PROCEDURE — 97166 OT EVAL MOD COMPLEX 45 MIN: CPT | Mod: GO

## 2017-06-17 PROCEDURE — 92523 SPEECH SOUND LANG COMPREHEN: CPT | Mod: GN | Performed by: SPEECH-LANGUAGE PATHOLOGIST

## 2017-06-17 PROCEDURE — 25000125 ZZHC RX 250: Performed by: INTERNAL MEDICINE

## 2017-06-17 PROCEDURE — 00000146 ZZHCL STATISTIC GLUCOSE BY METER IP

## 2017-06-17 PROCEDURE — 97163 PT EVAL HIGH COMPLEX 45 MIN: CPT | Mod: GP | Performed by: PHYSICAL THERAPIST

## 2017-06-17 RX ORDER — AMOXICILLIN 250 MG
1-2 CAPSULE ORAL
Status: DISCONTINUED | OUTPATIENT
Start: 2017-06-17 | End: 2017-06-19 | Stop reason: HOSPADM

## 2017-06-17 RX ORDER — DIGOXIN 250 MCG
250 TABLET ORAL DAILY
Status: DISCONTINUED | OUTPATIENT
Start: 2017-06-18 | End: 2017-06-19 | Stop reason: HOSPADM

## 2017-06-17 RX ADMIN — GABAPENTIN 200 MG: 100 CAPSULE ORAL at 08:05

## 2017-06-17 RX ADMIN — PREDNISONE 10 MG: 10 TABLET ORAL at 08:06

## 2017-06-17 RX ADMIN — Medication 2 TABLET: at 08:06

## 2017-06-17 RX ADMIN — INSULIN ASPART 1 UNITS: 100 INJECTION, SOLUTION INTRAVENOUS; SUBCUTANEOUS at 18:13

## 2017-06-17 RX ADMIN — ACETAMINOPHEN 650 MG: 325 TABLET, FILM COATED ORAL at 05:22

## 2017-06-17 RX ADMIN — TRAMADOL HYDROCHLORIDE 25 MG: 50 TABLET, FILM COATED ORAL at 20:17

## 2017-06-17 RX ADMIN — APIXABAN 2.5 MG: 2.5 TABLET, FILM COATED ORAL at 08:06

## 2017-06-17 RX ADMIN — GABAPENTIN 200 MG: 100 CAPSULE ORAL at 14:17

## 2017-06-17 RX ADMIN — FOLIC ACID 1 MG: 1 TABLET ORAL at 08:06

## 2017-06-17 RX ADMIN — DIGOXIN 250 MCG: 250 TABLET ORAL at 08:06

## 2017-06-17 RX ADMIN — MULTIPLE VITAMINS W/ MINERALS TAB 1 TABLET: TAB at 08:06

## 2017-06-17 RX ADMIN — APIXABAN 2.5 MG: 2.5 TABLET, FILM COATED ORAL at 20:17

## 2017-06-17 RX ADMIN — ACETAMINOPHEN 650 MG: 325 TABLET, FILM COATED ORAL at 16:13

## 2017-06-17 RX ADMIN — TRIAMCINOLONE ACETONIDE: 1 OINTMENT TOPICAL at 10:04

## 2017-06-17 RX ADMIN — TRIAMCINOLONE ACETONIDE: 1 OINTMENT TOPICAL at 20:21

## 2017-06-17 RX ADMIN — SENNOSIDES AND DOCUSATE SODIUM 2 TABLET: 8.6; 5 TABLET ORAL at 08:06

## 2017-06-17 RX ADMIN — INSULIN ASPART 2 UNITS: 100 INJECTION, SOLUTION INTRAVENOUS; SUBCUTANEOUS at 12:00

## 2017-06-17 RX ADMIN — CETIRIZINE HYDROCHLORIDE 10 MG: 10 TABLET, FILM COATED ORAL at 08:07

## 2017-06-17 RX ADMIN — Medication 30 MG: at 08:05

## 2017-06-17 RX ADMIN — ATENOLOL 50 MG: 50 TABLET ORAL at 08:07

## 2017-06-17 RX ADMIN — INSULIN ASPART 1 UNITS: 100 INJECTION, SOLUTION INTRAVENOUS; SUBCUTANEOUS at 08:18

## 2017-06-17 RX ADMIN — GABAPENTIN 200 MG: 100 CAPSULE ORAL at 20:17

## 2017-06-17 ASSESSMENT — ACTIVITIES OF DAILY LIVING (ADL): PREVIOUS_RESPONSIBILITIES: MEAL PREP;HOUSEKEEPING;LAUNDRY;SHOPPING;YARDWORK;MEDICATION MANAGEMENT;FINANCES;DRIVING;WORK

## 2017-06-17 NOTE — PROGRESS NOTES
"   06/17/17 1200   General Information, SLP   Type of Evaluation Speech and Language;Cognitive-Linguistic   Type of Visit Initial   Start of Care Date 06/17/17   Onset of Illness/Injury or Date of Surgery - Date 06/29/17   Referring Physician Dr. Rubi   Patient/Family Goals Statement SLP: pt reports \"sometimes can't find the word. Also difficulty rmembering information around the hospitalization/cardiac event   Pertinent History of Current Problem Amelia Michel \"Bree\" is a 56 year old right hand dominant female with a history of Rheumatoid Arthritis, HTN, VSD repair 1969, recently diagnosed a- fib with hospitalization 5/15-5/23, admitted to Cardiology 5/29 s/p cardiac arrest with rosc,s/p therapeutic hypothermia.  Etiology of cardiac arrest remains unclear.  Post arrest course was complicated by multi-organ failure including: pancytopenia, shock liver,  hypoxic respiratory failure,  CELSA, anoxic brain injury, and critical illness myopathy.  Prolonged hospital course further complicated by ESBL UTI and aspiration PNA.     Precautions/Limitations fall precautions   Oral Motor Sensory Function   Comments seen at hospital, will reassess, pt on NDD3 diet, thin fluids   Speech   Deficits in Articulation None   Language: Auditory Comprehension (understanding of spoken language)   Tests were administered at the following levels Complex (vocation/community/social activities)   Paragraph; Discourse Comprehension Test (out of 8 total; less than 7 is below mean) 7   Functional Assessment Scale (Auditory Comprehension) No Impairment   Comments (Auditory Comprehension) SLP:appears in tact, reduced memory/attention may impact   Language: Verbal Expression (use of spoken language to express information)   Tests were administered at the following levels Moderate (routine daily activities);Complex (vocation/community/social activities)   [place] Naming Test, short form (out of 15 total) 14   Define Words; [place] Test for " Differential Diagnosis Of Aphasia (out of 10 total) 8   Conversation; [place] Diagnostic Aphasia Exam rating (out of 5 total) 5   Functional Assessment Scale (Verbal Expression) Mild Impairment   Comments (Verbal Expression) SLP: pt noting mild difficulty, noted with more complex tasks.   Reading Comprehension (understanding of written language)   Tests were administered at the following levels Complex (vocation/community/social activities)   Sentences and Paragraphs; [place] Diagnostic Aphasia Exam (out of 10 total) 9   Functional Assessment Scale (Reading Comprehension) Minimal Impairment   Written Expression (use of writing to express information)   Tests were administered at the following levels Moderate (routine daily activities)   Generate Sentences; [place] Test for Differential Diagnosis Of Aphasia (out of 6 total) 6   Functional Assessment Scale (Written Expression) Minimal Impairment   Comments (Written Expression) SLP: increased time, to generate thoughts per pt   Pragmatics (the social or functional use of a language)   Functional Assessment Scale  (Pragmatics) No Impairment   Cognitive Status Examination   Attention impaired  (reduced for flexible attention)   Behavioral Observations WFL   Orientation ok   Short Term Memory impaired  (mild thus far, recalled 3/3 words, 3/8 paragraph)   Reasoning intact  (ok category exclusion, and numerical reasoning)   Organization 9  (pt needed redo, still with one error)   Additional cognitive-linguistic evaluation indicated  yes;recommend;Najma-Jesse;RBANS   General Therapy Interventions   Planned Therapy Interventions Dysphagia Treatment;Cognitive Treatment;Language   Language Verbal expression   Clinical Impression, SLP Eval   Criteria for Skilled Therapeutic Interventions Met Yes   SLP Diagnosis SLP: mild language and cognitive linguistic    Rehab Potential Good, to achieve stated therapy goals   Therapy Frequency Daily   Predicted Duration of Therapy  Intervention (days/wks) estimated 1-2 weeks   Anticipated Discharge Disposition Home with Outpatient Therapy   Risks and Benefits of Treatment have been explained. Yes   Patient, Family & other staff in agreement with plan of care Yes   Clinical Impression Comments SLP: pt seen for communication and cognitive linguistic evaluation.  Pt reports mild word finding and memory deficits since hospitalization.  Evaluation does confirm mild difficulty with verbal expression especially more complex tasks.  Also thus far note at least mild difficulty with flexible attention,recent memory, written sequencing.  Will plan further standardized/complex evaluation measures for cognitive skills. Pt is below baseline at would benefit from skilled intervention to increase safety and independence with IADLS to return home.   Total Evaluation Time      Total Evaluation Time (Minutes) 55

## 2017-06-17 NOTE — PROGRESS NOTES
06/17/17 0959   Quick Adds   Type of Visit Initial PT Evaluation   Living Environment   Lives With spouse   Living Arrangements house   Home Accessibility stairs to enter home;bed and bath on same level   Number of Stairs to Enter Home 3   Number of Stairs Within Home 0   Stair Railings at Home none   Transportation Available car;family or friend will provide   Living Environment Comment PT:  Pt as 2 steps into house from garage and 3 steps in front door.  Steps in the front door are more stable and accessable.  Pt lives in 1 level home.   Self-Care   Dominant Hand right   Usual Activity Tolerance good   Current Activity Tolerance poor   Regular Exercise no   Equipment Currently Used at Home walker, rolling   Activity/Exercise/Self-Care Comment PT:  Pt has walker, but not using prior to admission.  Pt able to amb up to .25 miles with no AD.  Pt has fit bit and aimed for 8000 steps per day.   Functional Level Prior   Ambulation 0-->independent   Transferring 0-->independent   Toileting 0-->independent   Fall history within last six months no   Number of times patient has fallen within last six months 0   Which of the above functional risks had a recent onset or change? ambulation;transferring;toileting   Prior Functional Level Comment PT:  Pt Ind prior to admission, but limited physically due to RA and A-fib.   General Information   Onset of Illness/Injury or Date of Surgery - Date 05/29/17   Referring Physician Flory Agarwal PA   Patient/Family Goals Statement Going home, to be Ind, to drive and cook, but not dishes or laundry.   Pertinent History of Current Problem (include personal factors and/or comorbidities that impact the POC) From H&P:  Pt diagnosed a- fib with hospitalization 5/15-5/23, admitted to Cardiology 5/29 s/p cardiac arrest with rosc,s/p therapeutic hypothermia.  Etiology of cardiac arrest remains unclear.  Post arrest course was complicated by multi-organ failure including:  pancytopenia, shock liver,  hypoxic respiratory failure,  CELSA, anoxic brain injury, and critical illness myopathy.  Prolonged hospital course further complicated by ESBL UTI and aspiration PNA.     Precautions/Limitations fall precautions   General Observations PT:  Pt RA most prevelent in wrists/hands and feet.   Cognitive Status Examination   Orientation orientation to person, place and time   Level of Consciousness alert   Follows Commands and Answers Questions 100% of the time;able to follow multistep instructions   Personal Safety and Judgment intact   Memory impaired   Cognitive Comment PT:  Pt notices mild cloundiness and memory issues.  She notes some difficulty with word finding and finishing sentances on occation.   Pain Assessment   Patient Currently in Pain Yes, see Vital Sign flowsheet   Integumentary/Edema   Integumentary/Edema Comments PT:  Pt followed by Lymph team due to BLE involvment.  Pt currently wrapped to Knee on L and above knee on R.  Pt alternating above knee wraps between legs to decrease loading on cardio system.   Posture    Posture Forward head position;Kyphosis   Posture Comments PT:  Pt presents in protracted and forward fllexed position.   Range of Motion (ROM)   ROM Comment PT:  Pt has limited ankle DF B.  Pt has ROM WFL on R and decreased ROM in knee and hip on L due to increased edema and neuro tension.   Strength   Strength Comments PT:  Pt demonstrates mod core weakness.  Pt has grossly 4-5/5 strength B with RLE > LLE.  Pt most limited by L knee flexion and B hip flexion/extension.  Pt has minimal functional endurance.    ARC Assessment Only   Acute Rehab FIM See FIM scores for Mobility/ADL Assessment   Balance   Balance Comments PT:  Pt demonstrates good sitting balance with BUE reachign activities.  Pt has fair balance with sit<>stand and standing with fww.  Pt limited on balance with no AD due to weakness.   Sensory Examination   Sensory Perception no deficits were identified    Coordination   Coordination no deficits were identified   Muscle Tone   Muscle Tone no deficits were identified   Modality Interventions   Planned Modality Interventions Cryotherapy;TENS   Planned Modality Interventions Comments PRN for pain management   General Therapy Interventions   Planned Therapy Interventions balance training;bed mobility training;gait training;manual therapy;motor coordination training;neuromuscular re-education;orthotic fitting/training;ROM;strengthening;stretching;transfer training;risk factor education;home program guidelines;progressive activity/exercise   Clinical Impression   Criteria for Skilled Therapeutic Intervention yes, treatment indicated   PT Diagnosis Muscle weakness; abnormality of gait and mobility   Influenced by the following impairments Muscle weakness, decreased activity tolerance, decreased cardiovasicalr system, lymphadema   Functional limitations due to impairments Bed mobility, transfers, gait, stairs   Clinical Presentation Unstable/Unpredictable   Clinical Presentation Rationale PT:  Pt has very unstable cardiac issues and uses life vest.  Pt needs consistent monitoring of cardiovascular endurance   Clinical Decision Making (Complexity) High complexity   Therapy Frequency` 2 times/day   Predicted Duration of Therapy Intervention (days/wks) 10 days   Anticipated Equipment Needs at Discharge front wheeled walker   Anticipated Discharge Disposition Home with Outpatient Therapy   Risk & Benefits of therapy have been explained Yes   Patient, Family & other staff in agreement with plan of care Yes   Clinical Impression Comments PT:  Pt very pleasent and hard working.  Pt will be a very good pt for PT rehabilitation.   Total Evaluation Time   Total Evaluation Time (Minutes) 30

## 2017-06-17 NOTE — PLAN OF CARE
Problem: Goal/Outcome  Goal: Goal Outcome Summary  Outcome: No Change  Alert and oriented to self, place and time, T-100.6, tylenol 650 mg given. Amb to the BR with CGA and her walker, continent of bowel on the toilet, inc of bladder. Patient has RA which affects her dexterity, therefore she needs max assist with mary-cares and diaper management. Life vest intact, no alarms noted. Lymph wraps intact on noam L/E, dressing intact on L lower arm. Patient requested her 0800  atenolol and digoxin at 0630, according to patient her heart rate will be elevated and she may not be able to do PTif she does not have them early, systolic BP was 95, therefore was not given. TF patent and infusing at 50 mi/hr, slept most of the night, will continue POC

## 2017-06-17 NOTE — PROGRESS NOTES
"No new issues except atenolol and digoxin was given later in day   No co pr sob   No fever or chills  No nausea or vomiting   Vital signs:  Temp: 98.5  F (36.9  C) Temp src: Oral BP: 101/62 Pulse: 88   Resp: 20 SpO2: 94 % O2 Device: None (Room air)   Height: 147.3 cm (4' 10\") Weight: 74.6 kg (164 lb 6.4 oz) (Standing)  Estimated body mass index is 34.36 kg/(m^2) as calculated from the following:    Height as of this encounter: 1.473 m (4' 10\").    Weight as of this encounter: 74.6 kg (164 lb 6.4 oz).   NGT  Chest ; basilar crepts . Fine. Clear   CVS ; s1 and s2 soft   GI ; abdomen is soft , bs positive  Ext ; lymphedema wraps  Labs;  Creatinine 0.66 , k 4.0   Wbc 2.3, hb 9.2, plt 51   A/P  56 year old woman with hx with RA ( on mtx, plaquenil and prednisone( Afib, VSD repair in past admitted to the  on 5/20 adter out of hospital cardiac arrest and with ROSC, therapeutic hypothermia and multiple medical complications.  Transferred to Acute rehab on 6/16      Cardiac arrest with Cardiogenic shock ; Etiology undetermined . Ct life vest till seen in EP clinic.       Afib ; on atenolol and Amiodarone. Also on Apixiban    Pancytopenia ; s/p  Blood transfusion . Hb stable at 8-9 and platelets 30s. Thought to  be due to RA  . Out patient Hematology follow up if counts not improving . However they have stabilized and up trending      Skin Rash : s/p punch biopsy 6/15 by Dermatology / on Triamcilone creme and cetrizine     ESBL UTI treated and also completed treatment for pneumonia     Rheumatoid Arthritis ; on 10 mg prednisone for next 2 weeks and then 7.5 mg for another two weeks and then 5 mg daily there after , till seen in Rheumatology. Currently MTX and HCQ is on hold due to cytopenias     Left Upper extremity Extravasation; due to IV . Ct cares. usg negative for DVT      Critical Illness myopathy ; PMR to address     CELSA : resolved      Severe malnutrition : PMR to address . On dysphagia 3 with think liquids and also " Rosa GARCÍA    discussed with nursing and PMR 6/17

## 2017-06-17 NOTE — PLAN OF CARE
Problem: Goal/Outcome  Goal: Goal Outcome Summary  Outcome: Therapy, progress toward functional goals as expected  Pt's chart reviewed, pt evaluated and POC established.  Pt present to ARU following extensive cardiac and medical deficits in the past month.  Pt demonstrates increased weakness, decreased activity tolerance, balance deficits and mild decreased cognition.  Pt currently is able to transfer and amb 75' with fww and CGA demonstrating no overt LOB.  Pt limited by SOB and HR.  Pt would benefit from skilled PT intervention in order to address aforementioned deficits.     D/C:  ESL is 10 days with d/c to home with  and OP cardio rehab     DME:  Pt has 4ww and will need fww if 4ww is not appropriate

## 2017-06-17 NOTE — PLAN OF CARE
Problem: Goal/Outcome  Goal: Goal Outcome Summary  Outcome: Therapy, progress toward functional goals as expected  SLP: pt seen for communication and cognitive linguistic evaluation.  Pt reports mild word finding and memory deficits since hospitalization.  Evaluation does confirm mild difficulty with verbal expression especially more complex tasks.  Also thus far note at least mild difficulty with flexible attention,recent memory, written sequencing.  Will plan further standardized/complex evaluation measures for cognitive skills. Will also plan swallowing evaluation next day, with goal to advance to regular diet.  Pt does continue to state she does not like hot foods at this time (only cold items). May need further dietary consult.

## 2017-06-17 NOTE — PROVIDER NOTIFICATION
Social Work: Initial Assessment with Discharge Plan    Patient Name: Amelia Michel  : 1960  Age: 56 year old  MRN: [id number]  Completed assessment with: patient and   Admitted to ARU: 17    Presenting Information   Date of SW assessment: 2017  Health Care Directive: Patient considering completing  Primary Health Care Agent: default to next of kin  Secondary Health Care Agent: NA  Living Situation: resides with her .  Previous Functional Status: independent of cares  DME available: see therapy notes  Patient and family understanding of hospitalization: Yes  Cultural/Language/Spiritual Considerations: English speaking      Physical Health  Reason for admission: Critical Myopathy    Provider Information   Primary Care Physician:Comfort Sabillon MD  : none    Mental Health/Chemical Dependency:   Diagnosis: denied history   Alcohol/Tobacco/Narcotis: denied concerns  Support/Services in Place: none  Services Needed/Recommended: none  Sexuality/Intimacy: denied concerns    Support System  Marital Status: , Wolf  Family support: pt also reported her sister that lives only a few houses away from them  Other support available: none    Community Resources  Current in home services: none  Previous services: none    Financial/Employment/Education  Employment Status: unknown  Income Source: income  Education: unknown  Financial Concerns:  denied  Insurance: [employer]      Discharge Plan   Patient and family discharge goal: Goal to return home with continued support  Provided Education on discharge plan: Yes  Patient agreeable to discharge plan:  Yes  Provided education and attained signature for Medicare IM and IRF Patient Rights and Privacy Information provided to patient : No  Provided patient with [place] [facility] [place] Resources: No  Barriers to discharge: Medically stable and progress with therapies    Discharge Recommendations   Disposition: Goal to  return home with continued support from family  Transportation Needs: family will provide  Name of Transportation Company and Phone: NA    Additional comments   Pt is a 56 yr old female admitted for an acute rehab stay following due to critical myopathy. Pt resides with her  in [place]. Pt reported additional support from her sister. Team working towards a safe d/c plan.     Please invite to Care Conference:  Wolf (spouse) - [phone]      ANDIE Hammonds, [facility]   Phone: [phone]  Pager: [phone]         06/17/17 1138   Living Arrangements   Lives With spouse   Living Arrangements house   Able to Return to Prior Living Arrangements yes   Home Safety   Patient Feels Safe Living in Home? yes   Discharge Planning   Anticipated Discharge Disposition home   Discharge Needs Assessment   Medical Team notified of plan? yes   Readmission Within The Last 30 Days no previous admission in last 30 days   Equipment Currently Used at Home walker, rolling   Transportation Available car;family or friend will provide

## 2017-06-17 NOTE — PLAN OF CARE
Problem: Goal/Outcome  Goal: Goal Outcome Summary  Outcome: No Change  Pt alert and oriented, upset this AM due to not getting digoxin and atenolol at 6 am due to low BP. Reports that she needs to get these meds early in the AM or else HR gets too high. This AM HR = 130, discussed with Dr. Zhou as BP was low. Gave meds and VSS Q2H afterwards. Incontinent of urine and stool x 2. STool is quite loose. Do not give senna tonight. Assist of 1 with walker and gait belt. Dressings intact, BLE lymphedema wraps intact. Dressing on left upper arm changed. Excoriation area seems charo healing ok. Re-dressed with Kerlex. Reports numbness and tingling in left hand (not new) and numbness in right heel which is also not new Iso for ESBL. Calorie counts, good appetite. TF off at 12 and order to restart at 1600. Continue to monitor and with POC.

## 2017-06-17 NOTE — PROGRESS NOTES
06/17/17 1225   Visit Information   Visit Made By Staff    Type of Visit On-call   Visited Patient   Interventions   Basic Spiritual Interventions    introduction/orientation to Spiritual Health Services;Reflective conversation;Assessment of spiritual needs/resources;Life Review;Prayer   Provision of Spiritual Resources  Jehovah's witness article(s)/object(s) of devotion   Advanced Assessments/Interventions   Presenting Concerns/Issues Spiritual/Mormon/emotional support   SPIRITUAL HEALTH SERVICES Progress Note  [facility]ARU         DATA:    Visited with pt on the basis of [facility] for spiritual support of the pt.  Reflected with pt around her hospital experience, sources of spiritual and emotional support and current spiritual health needs. Pt talked about her current situation and what it means for her. Pt shared her story and her family story. During my visit pt was laying down on her bed. No one was with her. Pt receives support from her  and network of friends.        INTERVENTION:    Emotional support. Reflective conversation integrating illness elements and family spiritual narratives.  I shared conversation that would invite God into the room and to bless those who in the room. I provided a special prayer asking of God to help pt. I gave one the Prayer Bookmark.      OUTCOME:      Pt received spiritual support and reflective conversation in the context of this hospitalization. The pt expressed appreciation for the visit and the encouragement that she felt.        PLAN:    I will inform unit  for spiritual care of the pt.                                                                                                                                           Royer Roach  Staff    Pager [phone]

## 2017-06-17 NOTE — PLAN OF CARE
Problem: Goal/Outcome  Goal: Goal Outcome Summary  Outcome: Therapy, progress toward functional goals as expected  OT: Initial eval completed and tx initiated. Pt is CGA/SBA - min phy A for transfer using RW. Pt has sensation/decrease fine motor strength in her BUE; and edema in her BLE. It is anticipated that pt will need approx. 10 days of acute rehab stay at home with spouse with assist as needed. Plan for outpatient OT and/or home health OT. DME: currently has a walk in shower with bench and raised toilet seat; however does not have grab bars and potentially reacher/sock-aide. Additional DME/AE assessment close to d/c.      Continue POC.

## 2017-06-17 NOTE — H&P
"     [facility]   Acute Rehabilitation Unit  Admission History and Physical     CHIEF COMPLAINT   \"here to get better\"     HISTORY OF PRESENT ILLNESS  Amelia Michel \"Bree\" is a 56 year old right hand dominant female with a history of Rheumatoid Arthritis, HTN, VSD repair 1969, recently diagnosed a- fib with hospitalization 5/15-5/23, admitted to Cardiology 5/29 s/p cardiac arrest with rosc,s/p therapeutic hypothermia.  Etiology of cardiac arrest remains unclear.  Post arrest course was complicated by multi-organ failure including: pancytopenia, shock liver,  hypoxic respiratory failure,  CELSA, anoxic brain injury, and critical illness myopathy.  Prolonged hospital course further complicated by ESBL UTI and aspiration PNA.       Treated with stress dose steroids with ongoing steroid taper.  Planning for ICD per EP unable to proceed with implantation at this time due to LUE IV extravasation/ felt to be high infection risk.  Discharged from hospital to ARU for ongoing medical management and aggressive rehabilitation with life vest in place.           On arrival to ARU patient reports overall doing well, denies nausea, chest pain, sob, headache or dizziness.  Reports ~ three loose stools daily without abdominal pain.  Reports intermittent palpitations during which describes feelings of nausea.  Bree fatigued, chronic urinary incontinence, decreased patience with self and other  she feels is related to prednisone.  Reports poor appetite with dislike for our menu, \"I like cold food, you have limited options', encouraged , Wolf, to bring in some food per her preferences.       Functionally, the patient is requiring mod assist for grooming, max assist for bathing, dressing, transfers with min assist ambulating with min assist.  Noted to have impaired memory and safety awareness.      PAST MEDICAL HISTORY   Reviewed and updated in Epic.   Past Medical History         Past " Medical History:   Diagnosis Date     Hypertension       Rheumatoid arthritis(714.0)              SURGICAL HISTORY  Reviewed and updated in Epic.   Past Surgical History          Past Surgical History:   Procedure Laterality Date     ANESTHESIA CARDIOVERSION N/A 5/17/2017     Procedure: ANESTHESIA CARDIOVERSION;  ANESTHESIA CARDIOVERSION;  Surgeon: GENERIC ANESTHESIA PROVIDER;  Location: [facility] OR     ARTHRODESIS WRIST   2000     Right wrist     FOOT SURGERY         4 left and 2 right     RELEASE CARPAL TUNNEL BILATERAL         REPAIR VENTRICULAR SEPTAL DEFECT   1969            SOCIAL HISTORY  Reviewed and updated in Epic.  Marital Status:   Living situation: lives with  2 jesse  Family support: supportive willing to take time off work if needed to support Bree  Vocational History: Works as clinical   Tobacco use: none  Alcohol use: 1 glass of wine per night  Illicit drug use: none   Social History    Social History            Social History     Marital status:        Spouse name: Romeo Michel     Number of children: 0     Years of education: N/A           Occupational History       [employer]              Social History Main Topics      Smoking status: Never Smoker      Smokeless tobacco: Never Used      Alcohol use Yes         Comment: Glass of wine two evenings a night      Drug use: No      Sexual activity: Not on file            Other Topics Concern     Parent/Sibling W/ Cabg, Mi Or Angioplasty Before 65f 55m? No      Social History Narrative            FAMILY HISTORY  Reviewed and updated in Epic.   Family History           Family History   Problem Relation Age of Onset     Breast Cancer Mother       Hypertension Mother       Alzheimer Disease Mother       Hypertension Father       Blood Disease Father         Lymphoa     Circulatory Father         A Fib     DIABETES Paternal Grandmother                 PRIOR FUNCTIONAL HISTORY   Pt was independent  with all ADLs/IADLs, transfers, mobility and gait.       MEDICATIONS  Scheduled meds   Prescriptions Prior to Admission            Prescriptions Prior to Admission   Medication Sig Dispense Refill Last Dose     melatonin 3 MG tablet Take 1 tablet (3 mg) by mouth nightly as needed for sleep           acetaminophen (TYLENOL) 325 MG tablet Take 2 tablets (650 mg) by mouth every 4 hours as needed for mild pain 100 tablet         apixaban ANTICOAGULANT (ELIQUIS) 2.5 MG tablet Take 1 tablet (2.5 mg) by mouth 2 times daily           cetirizine (ZYRTEC) 10 MG tablet Take 1 tablet (10 mg) by mouth daily 30 tablet         atenolol (TENORMIN) 50 MG tablet Take 1 tablet (50 mg) by mouth daily 30 tablet         digoxin (LANOXIN) 250 MCG tablet Take 1 tablet (250 mcg) by mouth daily 30 tablet         predniSONE (DELTASONE) 10 MG tablet Take 1 tablet (10 mg) by mouth daily           triamcinolone (KENALOG) 0.1 % ointment Apply topically 2 times daily           senna-docusate (SENOKOT-S;PERICOLACE) 8.6-50 MG per tablet Take 1-2 tablets by mouth 2 times daily 100 tablet         Calcium Carb-Cholecalciferol 600-800 MG-UNIT TABS Take 2 tablets by mouth every morning     5/28/2017 at Unknown time     Multiple Vitamins-Minerals (WOMENS MULTIVITAMIN PO) Take 1 tablet by mouth daily At bedtime     5/28/2017 at Unknown time     Coenzyme Q10 (COQ-10 PO) Take 1 tablet by mouth daily In the morning     5/28/2017 at Unknown time     gabapentin (NEURONTIN) 100 MG capsule Take 2 capsules (200 mg) by mouth 3 times daily (Patient taking differently: Take 200 mg by mouth Take 2 capsules (200 mg) by mouth every 6 hours (4 am, 10am, 4pm, 10pm)) 180 capsule 3 5/28/2017 at Unknown time     traMADol (ULTRAM) 50 MG tablet Take 50 mg by mouth Every 6 hours as needed for pain     5/28/2017 at Unknown time     folic acid (FOLVITE) 1 MG tablet Take 1 mg by mouth daily.     5/28/2017 at Unknown time            ALLERGIES           Allergies   Allergen  "Reactions     Penicillins       Tape [Adhesive Tape] Rash            REVIEW OF SYSTEMS  A 10 point ROS was performed and negative unless otherwise noted in HPI.         PHYSICAL EXAM  VITAL SIGNS:  /49 (BP Location: Right arm)  Pulse 71  Temp 97.4  F (36.3  C) (Oral)  Resp 18  Ht 1.473 m (4' 10\")  Wt 72.6 kg (160 lb)  SpO2 98%  BMI 33.44 kg/m2  BMI:  Estimated body mass index is 33.44 kg/(m^2) as calculated from the following:    Height as of this encounter: 1.473 m (4' 10\").    Weight as of this encounter: 72.6 kg (160 lb).      General:   HEENT: mucus membranes moist no scleral icterus  Pulmonary: non labored clear on room air  Cardiovascular: RRR   Abdominal: soft non distended non tender  Extremities: lymphedema wraps in place with edema to bilateral thighs  MSK/neuro:                         Mental Status:  alert and oriented x3                          Cranial Nerves: grossly normal                          Sensory: Normal to light touch in bilateral upper and lower extremities, with chronic right lower extremity numbness and tingling and acute numbness/tingling left thumb and pointer finger.                          Strength: moves all extremities against gravity, decreased ROM at left fingers/wrist and bicep.                          Reflexes: Right knee hyperreflexic compared to left                         Babinski reflex: down going bilaterally                          Abnormal movements: None                          Coordination: No dysmetria on finger to nose b/l                          Speech: clear coherent                         Cognition: alert intact to conversation reported memory impairment      Skin: red non raised rash more dense on chest extending down abdomen,  Right posterior arm ecchymosis, left ac with grey/yellow eschar with surrounding pink granulating skin.         LABS        Lab Results   Component Value Date     WBC 2.3 06/16/2017            Lab Results   Component " Value Date     RBC 2.90 06/16/2017            Lab Results   Component Value Date     HGB 9.2 06/16/2017            Lab Results   Component Value Date     HCT 27.9 06/16/2017            Lab Results   Component Value Date     MCV 96 06/16/2017            Lab Results   Component Value Date     MCH 31.7 06/16/2017            Lab Results   Component Value Date     MCHC 33.0 06/16/2017            Lab Results   Component Value Date     RDW 20.1 06/16/2017            Lab Results   Component Value Date     PLT 51 06/16/2017      Last Basic Metabolic Panel:  Lab Results   Component Value Date      06/16/2017             Lab Results   Component Value Date     POTASSIUM 4.0 06/16/2017            Lab Results   Component Value Date     CHLORIDE 103 06/16/2017            Lab Results   Component Value Date     VIKTORIYA 7.4 06/16/2017            Lab Results   Component Value Date     CO2 28 06/16/2017            Lab Results   Component Value Date     BUN 27 06/16/2017            Lab Results   Component Value Date     CR 0.66 06/16/2017            Lab Results   Component Value Date      06/16/2017            IMPRESSION/PLAN:  Amelia Michel is a 56 year old woman with a hx HTN, RA, recently diagnosed Afib/Flutter (5/15-5/23 hospitalization) and VSD s/p repair (1969) who was admitted on 5/29 to [facility] after out of hospital cardiac arrest with ROSC and s/p therapeutic hypothermia, hospital course was complicated by multisystem organ failure, PNA, and UTI, poor oral intake,  debility and suspected critical illness myopathy. Admitted to ARU 6/16 for ongoing medical management and aggressive rehabilitation.      Admission to acute inpatient rehab debility possible critical illness myopathy s/p cardiac arrest.    Impairment group code: 03.8        1. PT, OT and SLP 60 minutes of each on a daily basis, in addition to rehab nursing and close management of physiatrist.       2. Impairment of ADL's: noted to have impaired ROM, activity  tolerance, coordination, safety.  Currently performing lower body dressing with max assist, transferring with close CGA with FWW, fatigues while completing standing ADLs requiring seat for completion.      3. Impairment of mobility:  Noted to have impaired strength, activity tolerance, functional mobility and balance. Ambulating 60 feet x 2 with FWW and CGA-SBA.         4. Impairment of cognition/language/swallow:  Assess cognition, short term memory deficits, delayed processing and swallow.  Currently DD3 with thin liquids.      5. Medical Conditions- appreciate hospitalist assistance in management   Out of Hospital Cardiac Arrest and Cardiogenic Shock: ROSC with and therapeutic hypothermia.  Seen and evaluated by electrophysiology with  ICD recommended though deferred at this time in lieu of LUE extravasation/high risk of infection.   - Life Vest in place at all times (except bathing)   - plan for ICD implantation likely 4-6 weeks per EP. They will call patient to follow-up.               Afib: Known Afib w/ RVR from previous hospitalization (5/15-5/23) . She was discharged during previous hospitalization on Amiodarone and was also started on Apixaban. Patient had no evidence of ventriclar arrhythmia prior to discharge. She had 1 DCCV for which she reverted within 24 hrs. Tried and failed:  Sotalol, Metoprolol and Propranolol due to ae.    - Rhythm spontaneously converting from NSR to Afib w/ RVR w/ rates into the 130-140's.   - Continue w/ Digoxin 250 mg daily + Atenolol 50 mg daily.  - If rates sustain > 130 bpm would consider additional dose Atenolol 25 mg PO x 1.       RA: History of seropositive rheumatoid arthritis (anti-CCP positive) since late 1980;s w/ multiple MTP osteotomies, partial right wrist fusion, longstanding therapy consisting of MTX, predisone and HCQ  - Continue with Prednisone taper. Continue 10 mg daily, taper to 7.5 mg on 6/29, then to 5 mg daily after 2 weeks if continues to have low  disease activity  - Continue to hold HCQ and MTX until outpatient follow-up  - Follow-up with [facility]ology clinic Dr. Conor Cunha in 4-6 weeks after discharge       LUE Swelling/Extravasation:  LUE ultrasound negative for DVT.  Noted 5/30, see consult by [facility] dated 6/13 (by SUN Singh) for details Once this heals and is well granulated we will rapidly schedule a device implant.   - Wound care consult and dressing changes as ordered  -continue to monitor     Pancytopenia: 2/2 hypoproliferative bone marrow. Chronic thrombocytopenia Plts noted to decrease from 104 to as low as 15 during recent hospitalization requiring transfusion w/ chronically low in the 's as outpatient. Anemia Hgb stable 9.2, PLT count up 51. WBC stable 2.3 Seen and evaluated by hematology.   - Continue to hold RA meds   - Continue Apixaban 2.5 mg BID for anticoagulation. This will need to be held 2 days prior to ICD placement.  -cbc Q Mon/Thurs     Steroid/Tube feed induced hyperglycemia- no prior to admission diagnosis of DM, no recent hgb a1C, glucose elevated 140-263, elevation >2 weeks.   -continue to monitor  -ssi  -suspected improvement with tube feed and steroid taper-     Inadequate oral intake-  related to decreased appetite, nausea, meds, and diet order hindering PO intake as evidenced by calorie counts from hospitalization meeting 27% estimated kcal needs and 30% estimated protein needs  - Dysphagia Level 3 diet w/ thin liquids. Straws ok. Pt should be upright, alert, small bites/sips consumed and alternate consistencies.  - Nutrition recommendations are to continue Peptamen 1.5 Tube feeds to 40 ml/hr x 11 hours which will provide 440 ml TF, 660 kcals, and 30 g protein daily. TFs may be adjusted pending oral intake. Once kcal counts reveal that patient is consuming at least 900 kcals and 45 g protein daily on average can discontinue.  - Water flushes as needed.     Dysphagia- continue DD3 with thins  -appreciate ongoing  SLP eval      morbilliform eruption - (improved) seen and evaluated by dermatology 6/15- suspected drug eruption.   - punch biopsy performed 6/15- follow results  - continue triamcinolone 0.1% ointment BID to trunk and extremities for pruritus     Resolved Issues   Ischemic Liver Injury Complicated by Coagulopathy:  ESBL UTI:  Hypoxic Respiratory Failure:    CELSA: Stable. SCr 0.7-0.8, peak SCr 1.9.        6. Adjustment to disability:  Mood stable open to talking to - consult placed  7. FEN: DD3 with thin +TF-   8. Bowel: loose stools- monitor  9. Bladder: chronic incontinence-   10. DVT Prophylaxis: Apixiban- dose lowered due to thrombocytopenia  11. GI Prophylaxis: regular diet consider ppi- given steroids.   12. Code: full  13. Disposition: home  14. ELOS:  7-10 days  15. Rehab prognosis:  Expected improvement in strength, activity tolerance, oral intake with removal of Nasal feeding tube prior to discharge from ARU.   16. Follow up Appointments on Discharge: PCP, Cardiology, Rheumatology, EP           Seen and discussed with Dr. Rubi, PM&R staff physician      Flory Agarwal PA-C  Rehab Service  Pager [phone]        Physician Attestation     I, Richy Rubi, saw and evaluated Amelia Michel as part of a shared visit.  I have reviewed and discussed with the advanced practice provider their history, physical and plan.     I personally reviewed the vital signs, medications and labs.     My key history or physical exam findings and Key management decisions made by me:      While there is weakness in broader distribution, the severity isn't necessarily as consistent with critical illness myopathy and may represent more direct deconditioning. Recovery rate may help differentiate ([facility] traditionally recovers more slowly). 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 There also seems sensory and weakness left hand in median nerve distribution that may be linked with  left forearm tissue necrosis wound. No other significant differences are identified and the patient remains appropriate for an inpatient rehabilitation facility level of care to manage medical issues and address functional impairments.     Comorbid medical conditions being managed: cardiac arrest, presently with life vest defibrillator, anasarca/lymphedema, afib/recent vfib, HTN, RA, undernourishment (getting tube feeds still), left arm vascular access extravasation / necrotic tissue wound.      Prior functional level: fully independent though now weakness requires assist with transfers and ambulation. Focal left hand / index finger weakness on top of more generalized weakness pattern. Needing assist with some ADL's. Cognition seems to be pretty good.        Will benefit from intensive rehabilitation includin minutes each of PT and OT, Rehabilitation nursing  Close management by physiatry and hospitalist consult     Prognosis: good to discharge home, the rate depends on myopathy vs deconditioning though both have eventual good outcome. Complex medical issues with cardiac arrest will need close management and risk for future event.   Estimated length of stay: 7-10 days         Richy Rubi  Date of Service (when I saw the patient): 17

## 2017-06-17 NOTE — PLAN OF CARE
Problem: Goal/Outcome  Goal: Goal Outcome Summary  FOCUS/GOAL  Bowel management, Bladder management and Wound care management     ASSESSMENT, INTERVENTIONS AND CONTINUING PLAN FOR GOAL:  Pt alert and oriented, reports she has some mild memory problems as of late. Refused senokot stating she has gone twice this evening. Incontinent of urine x2, bm this evening. Assist of 1 with walker and gait belt. C/o pain to left hand, received prn tylenol and tramadol with some relief. Dressings intact, BLE lymphedema wraps intact. Pt refused nurse to remove to assess skin. Iso for ESBL. Calorie counts, good appetite, no difficulty swallowing noted. TF could not be started until 5:30pm. Per report, had brand d/c recently, please get a PVR.

## 2017-06-17 NOTE — PROGRESS NOTES
06/17/17 0917   Quick Adds   Type of Visit Initial Occupational Therapy Evaluation   Living Environment   Lives With spouse   Living Arrangements house  (1 level )   Home Accessibility stairs to enter home  (front door - 3 stairs and garage door - 2 stairs ; no railin)   Number of Stairs to Enter Home 2   Number of Stairs Within Home 0   Stair Railings at Home none   Transportation Available car;family or friend will provide   Living Environment Comment Pt. spouse workes outside the home but is taking some time off to assist pt.    Self-Care   Dominant Hand right   Usual Activity Tolerance good   Current Activity Tolerance fair   Regular Exercise no   Equipment Currently Used at Home walker, standard  (4 WW with seats )   Activity/Exercise/Self-Care Comment Pt does not do exercise but stays activite and uses her FitBit to walk    Functional Level Prior   Ambulation 0-->independent   Transferring 0-->independent   Toileting 0-->independent   Bathing 0-->independent   Dressing 0-->independent   Eating 0-->independent   Communication 0-->understands/communicates without difficulty   Swallowing 0-->swallows foods/liquids without difficulty   Cognition 0 - no cognition issues reported   Fall history within last six months no   Number of times patient has fallen within last six months 0   Which of the above functional risks had a recent onset or change? 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         Present no   General Information   Onset of Illness/Injury or Date of Surgery - Date 05/29/17   Referring Physician Mary Babcock MD    Patient/Family Goals Statement Return home and being able to cook as cooking is her  "fav. hobby    Additional Occupational Profile Info/Pertinent History of Current Problem \"Amelia Michel \"Bree\" is a 56 year old right hand dominant female with a history of Rheumatoid Arthritis, HTN, VSD repair 1969, recently diagnosed a- fib with hospitalization 5/15-5/23, admitted to Cardiology 5/29 s/p cardiac arrest with rosc,s/p therapeutic hypothermia.  Etiology of cardiac arrest remains unclear.  Post arrest course was complicated by multi-organ failure including: pancytopenia, shock liver,  hypoxic respiratory failure,  CELSA, anoxic brain injury, and critical illness myopathy.  Prolonged hospital course further complicated by ESBL UTI and aspiration PNA.  \"    Precautions/Limitations fall precautions   Weight-Bearing Status - LUE full weight-bearing   Weight-Bearing Status - RUE full weight-bearing   Weight-Bearing Status - LLE full weight-bearing   Weight-Bearing Status - RLE full weight-bearing   Heart Disease Risk Factors High blood pressure;Medical history   General Observations OT: Pt is cooperative and answered all questions    Cognitive Status Examination   Orientation orientation to person, place and time   Level of Consciousness alert   Able to Follow Commands success, 1-step commands   Cognitive Comment OT: pt states she has mild cognitive impairement    Visual Perception   Visual Perception Wears glasses   Visual Perception Comments Pt reports no problem with her vision post cardiac arrest    Sensory Examination   Sensory Comments Pt has sever rhemothoid arthriits on her L hand and might hve CTS on her R  hand, mostly in her median nerve region; On her BLE has decrease senstion due to RA    Pain Assessment   Patient Currently in Pain Yes, see Vital Sign flowsheet  (Wound on her L elbow )   Integumentary/Edema   Integumentary/Edema Comments Pt. with generalized edema in BUE and BLEs    Posture   Posture protracted shoulders;forward head position   Range of Motion (ROM)   ROM Comment Pt reports no " concerns with her ROM; however limited to her L hand due to wound on her elbow ; RUE/RLE - AROM WFL   Strength   Strength Comments Pt reports no concerns with her strength; at hospital had R shoulder pain however has gone away. RUE/RLE - WFL/WNL 5    ARC Assessment Only   Acute Rehab FIM See FIM scores for Mobility/ADL Assessment   Instrumental Activities of Daily Living (IADL)   Previous Responsibilities meal prep;housekeeping;laundry;shopping;yardwork;medication management;finances;driving;work   Activities of Daily Living Analysis   Impairments Contributing to Impaired Activities of Daily Living balance impaired;coordination impaired;postural control impaired;ROM decreased;sensation decreased;sensory feedback impaired;strength decreased;pain;cognition impaired   General Therapy Interventions   Planned Therapy Interventions ADL retraining;IADL retraining;balance training;bed mobility training;fine motor coordination training;motor coordination training;neuromuscular re-education;ROM;strengthening;progressive activity/exercise;home program guidelines;risk factor education;stretching;transfer training;cognition   Clinical Impression   Criteria for Skilled Therapeutic Interventions Met yes, treatment indicated   OT Diagnosis OT: decreased ADls/IADLs; decreased sensation; and generalized weakness/fatigue    Influenced by the following impairments OT: Impaired ability to complete adls; iadls; decreased balance; standing tolerance; decreased ability to complete transfer; mobility; and weakness/fatigue    Assessment of Occupational Performance 3-5 Performance Deficits   Identified Performance Deficits OT: home management; dressing; bathing; meall prep    Clinical Decision Making (Complexity) Moderate complexity   Therapy Frequency daily   Predicted Duration of Therapy Intervention (days/wks) 2 weeks, approx. 10 days    Anticipated Equipment Needs at Discharge reacher;other (see comments)  (grab bars in bathroom and  toilet )   Anticipated Discharge Disposition Home with Outpatient Therapy;Home with Home Therapy;Home with Assist   Risks and Benefits of Treatment have been explained. No   Patient, Family & other staff in agreement with plan of care No   Clinical Impression Comments OT: Pt currently demonstrates impaired ability to complete adls; iadls; decreased balance; standing tolerance;  ability to complete transfer; mobility. Skilled OT needed to address above mentioned deficits in order for a safe return home with her  as needed.    Total Evaluation Time   Total Evaluation Time (Minutes) 30

## 2017-06-17 NOTE — PROGRESS NOTES
[facility], [place]   Physical Medicine and Rehabilitation Daily Note           Assessment and Plan of Care:   Amelia Michel is a 56 year old woman with a hx HTN, RA, recently diagnosed Afib/Flutter (5/15-5/23 hospitalization) and VSD s/p repair (1969) who was admitted on 5/29 to [facility] after out of hospital cardiac arrest with ROSC and s/p therapeutic hypothermia, hospital course was complicated by multisystem organ failure, PNA, and UTI, poor oral intake,  debility and suspected critical illness myopathy. Admitted to ARU 6/16 for ongoing medical management and aggressive rehabilitation.     This AM atenolol and digoxin held due to low bp 95/58, discussed with nursing and hospitalist and plan to give atenolol and digoxin even if low bp because for this pt it is likely that these meds will increase the BP due to the rapid heart rate/afib. Pt agrees with this assessment and plan. Discussed adjusting morning dose time and hospitalist team to make changes.     HR/BP rechecked HR 90s-120s and /62. Monitor.    --Continue ongoing medical management.  --Continue therapies and plan of care.             Review of Systems:   Reports rapid heart rate.  Denies fever, chills, CP, SOB, N/V, abdominal pain, new pain or weakness/numbness/tingling.             Physical Exam:     Vitals:    06/17/17 0659 06/17/17 1004 06/17/17 1006 06/17/17 1008   BP:  101/62     BP Location:  Right arm     Pulse:  95 88    Resp:       Temp:    98.5  F (36.9  C)   TempSrc:    Oral   SpO2:       Weight: 74.6 kg (164 lb 6.4 oz)      Height:         Gen: NAD, resting in bed  Heart: tachycardic, regular rhythm  Lungs: clear breath sounds b/l  Abd: soft and non-tender  Ext: wwp, no edema in BLE, no tenderness in calves  MSK/neuro: alert and oriented. speech fluent. moves BUE and BLE volitionally.          Data:   Scheduled meds    [START ON 6/18/2017] digoxin  250 mcg Oral Daily     apixaban ANTICOAGULANT  2.5 mg Oral  BID     atenolol  50 mg Oral Daily     calcium-vitamin D  2 tablet Oral QAM     cetirizine  10 mg Oral Daily     co-enzyme Q-10  30 mg Oral Daily     folic acid  1 mg Oral Daily     gabapentin  200 mg Oral TID     multivitamin, therapeutic with minerals  1 tablet Oral Daily     predniSONE  10 mg Oral Daily     triamcinolone   Topical BID     [START ON 7/1/2017] predniSONE  7.5 mg Oral Daily     [START ON 7/15/2017] predniSONE  5 mg Oral Daily     insulin aspart  1-7 Units Subcutaneous TID AC     insulin aspart  1-5 Units Subcutaneous At Bedtime     melatonin  1 mg Oral At Bedtime       PRN meds:  senna-docusate, acetaminophen, melatonin, traMADol, - MEDICATION INSTRUCTIONS -, naloxone, glucose **OR** dextrose **OR** glucagon, IV fluid REPLACEMENT ONLY      Pt staffed with Dr. Rubi.  Gabriel Zhou D.O., PGY-2 06/17/2017 10:37 AM

## 2017-06-18 LAB
GLUCOSE BLDC GLUCOMTR-MCNC: 156 MG/DL (ref 70–99)
GLUCOSE BLDC GLUCOMTR-MCNC: 180 MG/DL (ref 70–99)
GLUCOSE BLDC GLUCOMTR-MCNC: 188 MG/DL (ref 70–99)
GLUCOSE BLDC GLUCOMTR-MCNC: 259 MG/DL (ref 70–99)

## 2017-06-18 PROCEDURE — 97532 ZZHC SP COGNITIVE SKILLS EA 15 MIN: CPT | Mod: GN | Performed by: SPEECH-LANGUAGE PATHOLOGIST

## 2017-06-18 PROCEDURE — 25000132 ZZH RX MED GY IP 250 OP 250 PS 637: Performed by: INTERNAL MEDICINE

## 2017-06-18 PROCEDURE — 25000132 ZZH RX MED GY IP 250 OP 250 PS 637: Performed by: PHYSICAL MEDICINE & REHABILITATION

## 2017-06-18 PROCEDURE — 97535 SELF CARE MNGMENT TRAINING: CPT | Mod: GO

## 2017-06-18 PROCEDURE — 99207 ZZC CDG-MDM COMPONENT: MEETS MODERATE - UP CODED: CPT | Performed by: INTERNAL MEDICINE

## 2017-06-18 PROCEDURE — 12800006 ZZH R&B REHAB

## 2017-06-18 PROCEDURE — 25000125 ZZHC RX 250: Performed by: INTERNAL MEDICINE

## 2017-06-18 PROCEDURE — 40000225 ZZH STATISTIC SLP WARD VISIT: Performed by: SPEECH-LANGUAGE PATHOLOGIST

## 2017-06-18 PROCEDURE — 97116 GAIT TRAINING THERAPY: CPT | Mod: GP | Performed by: PHYSICAL THERAPIST

## 2017-06-18 PROCEDURE — 97530 THERAPEUTIC ACTIVITIES: CPT | Mod: GP | Performed by: PHYSICAL THERAPIST

## 2017-06-18 PROCEDURE — 97110 THERAPEUTIC EXERCISES: CPT | Mod: GP | Performed by: PHYSICAL THERAPIST

## 2017-06-18 PROCEDURE — 40000193 ZZH STATISTIC PT WARD VISIT: Performed by: PHYSICAL THERAPIST

## 2017-06-18 PROCEDURE — 00000146 ZZHCL STATISTIC GLUCOSE BY METER IP

## 2017-06-18 PROCEDURE — 99232 SBSQ HOSP IP/OBS MODERATE 35: CPT | Performed by: INTERNAL MEDICINE

## 2017-06-18 PROCEDURE — 27210437 ZZH NUTRITION PRODUCT SEMIELEM INTERMED LITER

## 2017-06-18 PROCEDURE — 40000133 ZZH STATISTIC OT WARD VISIT

## 2017-06-18 RX ORDER — ASCORBIC ACID 500 MG
500 TABLET ORAL DAILY
Status: DISCONTINUED | OUTPATIENT
Start: 2017-06-18 | End: 2017-06-19 | Stop reason: HOSPADM

## 2017-06-18 RX ORDER — ZINC SULFATE 50(220)MG
220 CAPSULE ORAL DAILY
Status: DISCONTINUED | OUTPATIENT
Start: 2017-06-18 | End: 2017-06-19 | Stop reason: HOSPADM

## 2017-06-18 RX ORDER — OXYCODONE HYDROCHLORIDE 5 MG/1
5 TABLET ORAL ONCE
Status: DISCONTINUED | OUTPATIENT
Start: 2017-06-18 | End: 2017-06-19 | Stop reason: HOSPADM

## 2017-06-18 RX ORDER — BETA-CAROTENE 7500 MCG
25000 CAPSULE ORAL DAILY
Status: DISCONTINUED | OUTPATIENT
Start: 2017-06-18 | End: 2017-06-19 | Stop reason: HOSPADM

## 2017-06-18 RX ORDER — OXYCODONE HCL 10 MG/1
10 TABLET, FILM COATED, EXTENDED RELEASE ORAL EVERY 12 HOURS
Status: DISCONTINUED | OUTPATIENT
Start: 2017-06-18 | End: 2017-06-18

## 2017-06-18 RX ORDER — OXYCODONE HYDROCHLORIDE 5 MG/1
5 TABLET ORAL EVERY 4 HOURS PRN
Status: DISCONTINUED | OUTPATIENT
Start: 2017-06-18 | End: 2017-06-19 | Stop reason: HOSPADM

## 2017-06-18 RX ADMIN — Medication: at 22:40

## 2017-06-18 RX ADMIN — ACETAMINOPHEN 650 MG: 325 TABLET, FILM COATED ORAL at 00:27

## 2017-06-18 RX ADMIN — APIXABAN 2.5 MG: 2.5 TABLET, FILM COATED ORAL at 21:05

## 2017-06-18 RX ADMIN — OXYCODONE HYDROCHLORIDE 5 MG: 5 TABLET ORAL at 21:05

## 2017-06-18 RX ADMIN — OXYCODONE HYDROCHLORIDE AND ACETAMINOPHEN 500 MG: 500 TABLET ORAL at 11:17

## 2017-06-18 RX ADMIN — DIGOXIN 250 MCG: 250 TABLET ORAL at 06:34

## 2017-06-18 RX ADMIN — GABAPENTIN 200 MG: 100 CAPSULE ORAL at 21:04

## 2017-06-18 RX ADMIN — TRAMADOL HYDROCHLORIDE 25 MG: 50 TABLET, FILM COATED ORAL at 02:22

## 2017-06-18 RX ADMIN — INSULIN ASPART 1 UNITS: 100 INJECTION, SOLUTION INTRAVENOUS; SUBCUTANEOUS at 18:36

## 2017-06-18 RX ADMIN — ATENOLOL 50 MG: 50 TABLET ORAL at 09:28

## 2017-06-18 RX ADMIN — Medication: at 14:27

## 2017-06-18 RX ADMIN — ACETAMINOPHEN 650 MG: 325 TABLET, FILM COATED ORAL at 18:35

## 2017-06-18 RX ADMIN — TRIAMCINOLONE ACETONIDE: 1 OINTMENT TOPICAL at 08:56

## 2017-06-18 RX ADMIN — GABAPENTIN 200 MG: 100 CAPSULE ORAL at 14:26

## 2017-06-18 RX ADMIN — INSULIN ASPART 3 UNITS: 100 INJECTION, SOLUTION INTRAVENOUS; SUBCUTANEOUS at 12:21

## 2017-06-18 RX ADMIN — CETIRIZINE HYDROCHLORIDE 10 MG: 10 TABLET, FILM COATED ORAL at 08:51

## 2017-06-18 RX ADMIN — GABAPENTIN 200 MG: 100 CAPSULE ORAL at 08:50

## 2017-06-18 RX ADMIN — TRAMADOL HYDROCHLORIDE 25 MG: 50 TABLET, FILM COATED ORAL at 00:27

## 2017-06-18 RX ADMIN — PREDNISONE 10 MG: 10 TABLET ORAL at 08:51

## 2017-06-18 RX ADMIN — Medication 25000 UNITS: at 12:34

## 2017-06-18 RX ADMIN — FOLIC ACID 1 MG: 1 TABLET ORAL at 14:31

## 2017-06-18 RX ADMIN — APIXABAN 2.5 MG: 2.5 TABLET, FILM COATED ORAL at 08:51

## 2017-06-18 RX ADMIN — Medication 30 MG: at 08:50

## 2017-06-18 RX ADMIN — ACETAMINOPHEN 650 MG: 325 TABLET, FILM COATED ORAL at 08:49

## 2017-06-18 RX ADMIN — MULTIPLE VITAMINS W/ MINERALS TAB 1 TABLET: TAB at 08:50

## 2017-06-18 RX ADMIN — INSULIN ASPART 1 UNITS: 100 INJECTION, SOLUTION INTRAVENOUS; SUBCUTANEOUS at 08:52

## 2017-06-18 RX ADMIN — Medication 2 TABLET: at 08:50

## 2017-06-18 RX ADMIN — ZINC SULFATE CAP 220 MG (50 MG ELEMENTAL ZN) 220 MG: 220 (50 ZN) CAP at 12:33

## 2017-06-18 NOTE — PLAN OF CARE
Problem: Goal/Outcome  Goal: Goal Outcome Summary  FOCUS/GOAL  Bowel management, Nutrition/Feeding/Swallowing precautions and Wound care management     ASSESSMENT, INTERVENTIONS AND CONTINUING PLAN FOR GOAL:  Pt AOx4, calls appropriately for assistance. Assist of 1 with walker to BS, loose bm x2 with one being incontinent, incontinent of urine x3. PVR 13. Needed help with pericares after BM. Lymphedema wraps removed d/t BM on them, legs washed, new wraps applied, ordered more wraps from [facility] and sween cream. TF started at 1600. Received prn tylenol and 25mg tramadol for left arm pain with relief. Refused 50mg and melatonin stating she felt hung over this morning.

## 2017-06-18 NOTE — PROGRESS NOTES
06/18/17 1000   Signing Clinician's Name / Credentials   [facility]'s [facility] / [facility]   Quick Adds   Rehab Discipline SLP   Additional Documentation   SLP Plan SLP: pm session was cancelled Sunday.  See for swallowing evalution, current and regular trial.  Also can try resume WJ-R test- analysis synthesis, had to stop 2/2 time, reeducate regarding directions, also next day or so RBANS   Total Session Time   Total Session Time (minutes) 25 minutes   SLP - Acute Rehab Center Time   Individual Time (minutes) - enter zero if not applicable - SLP 25   Comprehension (FIM)   Functional Performance Complete independence comprehending complex/abstract ideas   Expression (FIM)   Functional Performance Mild difficulty expressing complex/abstract ideas   Expression Comment minimal difficulty   Problem Solving (FIM)   Functional Performance Needs increased time to make decisions and solve complex problems   Problem Solving Comment SLP: Wj-R test of verbal analogies pt scored 36th percentile, started additional test, but unable to complete 2/2 time constraints (noted pt was jhaving difficulty at complex level)   Memory (FIM)   Functional Performance Minimal prompting-recognizes and remembers 75-90% of the time

## 2017-06-18 NOTE — PLAN OF CARE
Problem: Goal/Outcome  Goal: Goal Outcome Summary  Outcome: Therapy, progress toward functional goals as expected  Pt had good day in therapy and continues to demonstrate progression.  Pt reported difficulty sleeping last night due to pain in L arm and it continues to cause pain during therapy session.  Pt activity tolerance is increasing and was able to amb 125' and complete 2 bouts of 5 min on Nustep.  Pt maintained HR at 75-85 during therapy session.

## 2017-06-18 NOTE — PLAN OF CARE
Problem: Goal/Outcome  Goal: Goal Outcome Summary  Outcome: Therapy, progress toward functional goals as expected  SLP: -30 minutes/swallowing evaluation 2/2 alarms/concerns regarding life vest - spouse/staff addressed

## 2017-06-18 NOTE — PLAN OF CARE
Problem: Goal/Outcome  Goal: Goal Outcome Summary  Pt is Alert and oriented x 3.No pain this shift. Tylenol 650 mg given this am per Pt request. Amb to the BR with CGA using a  walker, continent of bowel on the toilet, inc of urine this am.Dressing at left arm changed and Morphine gel applied as per order. Patient had a shower and after she had a medium soft BM , assisted with per care. Patient HR tends to be tarchy in 138's,   Atenolol 50 mg given, MD aware.NG patent with flushes,  TF stopped at 1200 Nursing will continue POC

## 2017-06-18 NOTE — PROGRESS NOTES
[facility], [place]   Physical Medicine and Rehabilitation Daily Note           Assessment and Plan of Care:   Amelia Michel is a 56 year old woman with a hx HTN, RA, recently diagnosed Afib/Flutter (5/15-5/23 hospitalization) and VSD s/p repair (1969) who was admitted on 5/29 to [facility] after out of hospital cardiac arrest with ROSC and s/p therapeutic hypothermia, hospital course was complicated by multisystem organ failure, PNA, and UTI, poor oral intake,  debility and suspected critical illness myopathy. Admitted to ARU 6/16 for ongoing medical management and aggressive rehabilitation.     Reports pain this morning after therapies yesterday in left arm. Wound examined and currently receiving q48 hour dressing changes with microklenz and polymem foam.  Discussed with hospitalist and prescribed morphine gel to apply to left arm for pain. Pain severe and disrupted sleep overnight.    HR/BP rechecked  this morning and /62. Monitor.    --Hypoalbuminemia 1.6, increased beneprotein supplementation to BID, also added vit a/c/zinc x10 days.  --Continue ongoing medical management.  --Continue therapies and plan of care.             Review of Systems:   Reports rapid heart rate.  Denies fever, chills, CP, SOB, N/V, abdominal pain, new pain or weakness/numbness/tingling.             Physical Exam:     Vitals:    06/17/17 1641 06/18/17 0700 06/18/17 0755 06/18/17 0928   BP: 119/61  123/71 111/62   BP Location:   Right arm Right arm   Pulse: 73  133 138   Resp:   18    Temp: 98.4  F (36.9  C)  98.9  F (37.2  C)    TempSrc: Oral  Oral    SpO2: 96%  95%    Weight:  75.5 kg (166 lb 6.4 oz)     Height:         Gen: NAD, resting in bed  Heart: tachycardic, regular rhythm  Lungs: clear breath sounds b/l  Abd: soft and non-tender  Ext: wwp, no edema in BLE, no tenderness in calves, LUE wound, integ with sloughed skin unable to see base of wound.  MSK/neuro: alert and oriented. speech fluent.  david SIDHU .          Data:   Scheduled meds    oxyCODONE  5 mg Oral Once     morphine 0.1% in solosite   Transdermal TID     digoxin  250 mcg Oral Daily     apixaban ANTICOAGULANT  2.5 mg Oral BID     atenolol  50 mg Oral Daily     calcium-vitamin D  2 tablet Oral QAM     cetirizine  10 mg Oral Daily     co-enzyme Q-10  30 mg Oral Daily     folic acid  1 mg Oral Daily     gabapentin  200 mg Oral TID     multivitamin, therapeutic with minerals  1 tablet Oral Daily     predniSONE  10 mg Oral Daily     triamcinolone   Topical BID     [START ON 7/1/2017] predniSONE  7.5 mg Oral Daily     [START ON 7/15/2017] predniSONE  5 mg Oral Daily     insulin aspart  1-7 Units Subcutaneous TID AC     insulin aspart  1-5 Units Subcutaneous At Bedtime     melatonin  1 mg Oral At Bedtime       PRN meds:  oxyCODONE, senna-docusate, acetaminophen, melatonin, traMADol, - MEDICATION INSTRUCTIONS -, naloxone, glucose **OR** dextrose **OR** glucagon, IV fluid REPLACEMENT ONLY      Pt staffed with Dr. Rubi.  Gabriel Zhou D.O., PGY-2 06/18/2017 10:04 AM

## 2017-06-18 NOTE — PLAN OF CARE
Problem: Goal/Outcome  Goal: Goal Outcome Summary  FOCUS/GOAL  Bowel management, Bladder management, Medication management, Pain management and Safety management     ASSESSMENT, INTERVENTIONS AND CONTINUING PLAN FOR GOAL:  Pt is alert and oriented. Incontinent of bladder x 1. Incontinent of large loose stool x 1. Medications taken whole with water. Pain control was an issue overnight. Pt took 25 mg Tramadol instead of 50 mg around 2000. Writer gave pt additional 25 mg at 0030 in order to get her up to the ordered 50 mg. Pt continued to c/o pain in her left arm. Call placed to hospitalist for assistance with pain control. Hospitalist ordered additional 25 mg to be given. Pt was able to get relief and fall asleep. Pt uses call light appropriately, able to make needs known. Will continue with POC.

## 2017-06-18 NOTE — PROGRESS NOTES
"    Pain in left arm   Hard to sleep  Bit anxious  No fever or chills  No nausea or vomiting   Vital signs:  Temp: 98.9  F (37.2  C) Temp src: Oral BP: 111/62 Pulse: 138 Heart Rate: 138 Resp: 18 SpO2: 95 % O2 Device: None (Room air)   Height: 147.3 cm (4' 10\") Weight: 75.5 kg (166 lb 6.4 oz)  Estimated body mass index is 34.78 kg/(m^2) as calculated from the following:    Height as of this encounter: 1.473 m (4' 10\").    Weight as of this encounter: 75.5 kg (166 lb 6.4 oz).    NGT  Chest ; basilar crepts . Fine. Clear   CVS ; s1 and s2 soft   GI ; abdomen is soft , bs positive  Ext ; lymphedema wraps  Labs;  Creatinine 0.66 , k 4.0   Wbc 2.3, hb 9.2, plt 51   A/P  56 year old woman with hx with RA ( on mtx, plaquenil and prednisone( Afib, VSD repair in past admitted to the  on 5/20 adter out of hospital cardiac arrest and with ROSC, therapeutic hypothermia and multiple medical complications.  Transferred to Acute rehab on 6/16        Cardiac arrest with Cardiogenic shock ; Etiology undetermined . Ct life vest till seen in EP clinic.        Afib ; on atenolol and Amiodarone. Also on Apixiban     Pancytopenia ; s/p  Blood transfusion . Hb stable at 8-9 and platelets 30s. Thought to  be due to RA  . Out patient Hematology follow up if counts not improving . However they have stabilized and up trending       Skin Rash : s/p punch biopsy 6/15 by Dermatology / on Triamcilone creme and cetrizine      ESBL UTI treated and also completed treatment for pneumonia      Rheumatoid Arthritis ; on 10 mg prednisone for next 2 weeks and then 7.5 mg for another two weeks and then 5 mg daily there after , till seen in Rheumatology. Currently MTX and HCQ is on hold due to cytopenias      Left Upper extremity Extravasation; due to IV . Ct cares. usg negative for DVT   Add morphine gel for pain control      Critical Illness myopathy ; PMR       CELSA : resolved       Severe malnutrition : . On dysphagia 3 with think liquids and also " Rosa GARCÍA    discussed with nursing and PMR 6/18

## 2017-06-18 NOTE — PLAN OF CARE
Problem: Goal/Outcome  Goal: Goal Outcome Summary  Outcome: Therapy, progress toward functional goals as expected  OT: Pt completed full shower assessment this session. Pt. HR at 136 at start of OT Session; nurse informed. Therefore used w/c for transfer from room to shower and back. Needed additiaonl time throughout shower for fatigue and vc throughout for PLB.  At end of therpay session, pt lying in bed. Pt sink reddish/purplish in back due to life vest belt and had slight nose blead, nurse informed.      Continue POC.

## 2017-06-19 ENCOUNTER — APPOINTMENT (OUTPATIENT)
Dept: GENERAL RADIOLOGY | Facility: CLINIC | Age: 57
DRG: 871 | End: 2017-06-19
Attending: INTERNAL MEDICINE
Payer: COMMERCIAL

## 2017-06-19 ENCOUNTER — CARE COORDINATION (OUTPATIENT)
Dept: CARE COORDINATION | Facility: CLINIC | Age: 57
End: 2017-06-19

## 2017-06-19 ENCOUNTER — HOSPITAL ENCOUNTER (INPATIENT)
Facility: CLINIC | Age: 57
LOS: 46 days | Discharge: NURSING, REHAB OR LONG-TERM CARE | DRG: 871 | End: 2017-08-04
Attending: INTERNAL MEDICINE | Admitting: INTERNAL MEDICINE
Payer: COMMERCIAL

## 2017-06-19 ENCOUNTER — APPOINTMENT (OUTPATIENT)
Dept: MRI IMAGING | Facility: CLINIC | Age: 57
DRG: 871 | End: 2017-06-19
Attending: INTERNAL MEDICINE
Payer: COMMERCIAL

## 2017-06-19 ENCOUNTER — APPOINTMENT (OUTPATIENT)
Dept: GENERAL RADIOLOGY | Facility: CLINIC | Age: 57
DRG: 091 | End: 2017-06-19
Attending: PHYSICAL MEDICINE & REHABILITATION
Payer: COMMERCIAL

## 2017-06-19 VITALS
SYSTOLIC BLOOD PRESSURE: 95 MMHG | HEART RATE: 88 BPM | WEIGHT: 166.8 LBS | HEIGHT: 58 IN | TEMPERATURE: 98.5 F | OXYGEN SATURATION: 95 % | BODY MASS INDEX: 35.01 KG/M2 | DIASTOLIC BLOOD PRESSURE: 47 MMHG | RESPIRATION RATE: 20 BRPM

## 2017-06-19 DIAGNOSIS — R73.9 HYPERGLYCEMIA: ICD-10-CM

## 2017-06-19 DIAGNOSIS — G47.00 INSOMNIA, UNSPECIFIED: ICD-10-CM

## 2017-06-19 DIAGNOSIS — E61.9 DEFICIENCY OF NUTRIENT ELEMENT, UNSPECIFIED: ICD-10-CM

## 2017-06-19 DIAGNOSIS — C83.398 DIFFUSE LARGE B-CELL LYMPHOMA OF EXTRANODAL SITE: ICD-10-CM

## 2017-06-19 DIAGNOSIS — E83.51 HYPOCALCEMIA: Primary | ICD-10-CM

## 2017-06-19 DIAGNOSIS — R57.0 CARDIOGENIC SHOCK (H): ICD-10-CM

## 2017-06-19 DIAGNOSIS — G58.9 MONONEUROPATHY: ICD-10-CM

## 2017-06-19 DIAGNOSIS — I15.8 OTHER SECONDARY HYPERTENSION: ICD-10-CM

## 2017-06-19 DIAGNOSIS — R21 RASH AND NONSPECIFIC SKIN ERUPTION: ICD-10-CM

## 2017-06-19 DIAGNOSIS — K59.00 CONSTIPATION, UNSPECIFIED CONSTIPATION TYPE: ICD-10-CM

## 2017-06-19 DIAGNOSIS — I47.19 ATRIAL TACHYCARDIA (H): ICD-10-CM

## 2017-06-19 DIAGNOSIS — E53.8 FOLIC ACID DEFICIENCY: ICD-10-CM

## 2017-06-19 DIAGNOSIS — M79.602 ARM PAIN, LEFT: ICD-10-CM

## 2017-06-19 DIAGNOSIS — R60.1 GENERALIZED EDEMA: ICD-10-CM

## 2017-06-19 DIAGNOSIS — I48.0 PAROXYSMAL ATRIAL FIBRILLATION (H): ICD-10-CM

## 2017-06-19 DIAGNOSIS — M05.79 RHEUMATOID ARTHRITIS INVOLVING MULTIPLE SITES WITH POSITIVE RHEUMATOID FACTOR (H): ICD-10-CM

## 2017-06-19 PROBLEM — S41.102A WOUND OF LEFT UPPER EXTREMITY: Status: ACTIVE | Noted: 2017-06-19

## 2017-06-19 PROBLEM — A41.9 SEPSIS (H): Status: ACTIVE | Noted: 2017-06-19

## 2017-06-19 LAB
ALBUMIN SERPL-MCNC: 1.8 G/DL (ref 3.4–5)
ALBUMIN UR-MCNC: 10 MG/DL
ALP SERPL-CCNC: 180 U/L (ref 40–150)
ALT SERPL W P-5'-P-CCNC: 63 U/L (ref 0–50)
ANION GAP SERPL CALCULATED.3IONS-SCNC: 10 MMOL/L (ref 3–14)
ANION GAP SERPL CALCULATED.3IONS-SCNC: 7 MMOL/L (ref 3–14)
APPEARANCE UR: ABNORMAL
APTT PPP: 31 SEC (ref 22–37)
AST SERPL W P-5'-P-CCNC: 41 U/L (ref 0–45)
BACTERIA #/AREA URNS HPF: ABNORMAL /HPF
BILIRUB SERPL-MCNC: 0.7 MG/DL (ref 0.2–1.3)
BILIRUB UR QL STRIP: NEGATIVE
BUN SERPL-MCNC: 31 MG/DL (ref 7–30)
BUN SERPL-MCNC: 32 MG/DL (ref 7–30)
CALCIUM SERPL-MCNC: 7.5 MG/DL (ref 8.5–10.1)
CALCIUM SERPL-MCNC: 7.8 MG/DL (ref 8.5–10.1)
CHLORIDE SERPL-SCNC: 104 MMOL/L (ref 94–109)
CHLORIDE SERPL-SCNC: 105 MMOL/L (ref 94–109)
CO2 SERPL-SCNC: 21 MMOL/L (ref 20–32)
CO2 SERPL-SCNC: 24 MMOL/L (ref 20–32)
COLOR UR AUTO: YELLOW
CREAT SERPL-MCNC: 0.66 MG/DL (ref 0.52–1.04)
CREAT SERPL-MCNC: 0.71 MG/DL (ref 0.52–1.04)
CRP SERPL-MCNC: 98 MG/L (ref 0–8)
ERYTHROCYTE [DISTWIDTH] IN BLOOD BY AUTOMATED COUNT: 20.5 % (ref 10–15)
ERYTHROCYTE [DISTWIDTH] IN BLOOD BY AUTOMATED COUNT: 20.6 % (ref 10–15)
ERYTHROCYTE [SEDIMENTATION RATE] IN BLOOD BY WESTERGREN METHOD: 63 MM/H (ref 0–30)
GFR SERPL CREATININE-BSD FRML MDRD: 85 ML/MIN/1.7M2
GFR SERPL CREATININE-BSD FRML MDRD: ABNORMAL ML/MIN/1.7M2
GLUCOSE BLDC GLUCOMTR-MCNC: 103 MG/DL (ref 70–99)
GLUCOSE BLDC GLUCOMTR-MCNC: 143 MG/DL (ref 70–99)
GLUCOSE BLDC GLUCOMTR-MCNC: 183 MG/DL (ref 70–99)
GLUCOSE BLDC GLUCOMTR-MCNC: 210 MG/DL (ref 70–99)
GLUCOSE SERPL-MCNC: 149 MG/DL (ref 70–99)
GLUCOSE SERPL-MCNC: 159 MG/DL (ref 70–99)
GLUCOSE UR STRIP-MCNC: NEGATIVE MG/DL
GRAM STN SPEC: ABNORMAL
GRAN CASTS #/AREA URNS LPF: 11 /LPF
HCT VFR BLD AUTO: 26.4 % (ref 35–47)
HCT VFR BLD AUTO: 30.6 % (ref 35–47)
HGB BLD-MCNC: 10.2 G/DL (ref 11.7–15.7)
HGB BLD-MCNC: 9 G/DL (ref 11.7–15.7)
HGB UR QL STRIP: NEGATIVE
HYALINE CASTS #/AREA URNS LPF: 4 /LPF (ref 0–2)
INR PPP: 1.44 (ref 0.86–1.14)
KETONES UR STRIP-MCNC: NEGATIVE MG/DL
LACTATE BLD-SCNC: 1.8 MMOL/L (ref 0.7–2.1)
LACTATE BLD-SCNC: 3.1 MMOL/L (ref 0.7–2.1)
LEUKOCYTE ESTERASE UR QL STRIP: NEGATIVE
Lab: ABNORMAL
MAGNESIUM SERPL-MCNC: 1.8 MG/DL (ref 1.6–2.3)
MCH RBC QN AUTO: 32.3 PG (ref 26.5–33)
MCH RBC QN AUTO: 32.3 PG (ref 26.5–33)
MCHC RBC AUTO-ENTMCNC: 33.3 G/DL (ref 31.5–36.5)
MCHC RBC AUTO-ENTMCNC: 34.1 G/DL (ref 31.5–36.5)
MCV RBC AUTO: 95 FL (ref 78–100)
MCV RBC AUTO: 97 FL (ref 78–100)
MICRO REPORT STATUS: ABNORMAL
MIXED CELL CASTS #/AREA URNS LPF: 1 /LPF
MUCOUS THREADS #/AREA URNS LPF: PRESENT /LPF
NITRATE UR QL: POSITIVE
NT-PROBNP SERPL-MCNC: 3326 PG/ML (ref 0–900)
PH UR STRIP: 5.5 PH (ref 5–7)
PLATELET # BLD AUTO: 44 10E9/L (ref 150–450)
PLATELET # BLD AUTO: 51 10E9/L (ref 150–450)
POTASSIUM SERPL-SCNC: 4.6 MMOL/L (ref 3.4–5.3)
POTASSIUM SERPL-SCNC: 4.7 MMOL/L (ref 3.4–5.3)
PROCALCITONIN SERPL-MCNC: 0.62 NG/ML
PROT SERPL-MCNC: 5.4 G/DL (ref 6.8–8.8)
RBC # BLD AUTO: 2.79 10E12/L (ref 3.8–5.2)
RBC # BLD AUTO: 3.16 10E12/L (ref 3.8–5.2)
RBC #/AREA URNS AUTO: 0 /HPF (ref 0–2)
SODIUM SERPL-SCNC: 135 MMOL/L (ref 133–144)
SODIUM SERPL-SCNC: 136 MMOL/L (ref 133–144)
SP GR UR STRIP: 1.01 (ref 1–1.03)
SPECIMEN SOURCE: ABNORMAL
SQUAMOUS #/AREA URNS AUTO: <1 /HPF (ref 0–1)
TRANS CELLS #/AREA URNS HPF: <1 /HPF (ref 0–1)
URN SPEC COLLECT METH UR: ABNORMAL
UROBILINOGEN UR STRIP-MCNC: NORMAL MG/DL (ref 0–2)
WBC # BLD AUTO: 2.5 10E9/L (ref 4–11)
WBC # BLD AUTO: 3.1 10E9/L (ref 4–11)
WBC #/AREA URNS AUTO: 2 /HPF (ref 0–2)

## 2017-06-19 PROCEDURE — 71020 XR CHEST 2 VW: CPT

## 2017-06-19 PROCEDURE — 87186 SC STD MICRODIL/AGAR DIL: CPT | Performed by: PHYSICAL MEDICINE & REHABILITATION

## 2017-06-19 PROCEDURE — 83880 ASSAY OF NATRIURETIC PEPTIDE: CPT | Performed by: STUDENT IN AN ORGANIZED HEALTH CARE EDUCATION/TRAINING PROGRAM

## 2017-06-19 PROCEDURE — 25000132 ZZH RX MED GY IP 250 OP 250 PS 637: Performed by: INTERNAL MEDICINE

## 2017-06-19 PROCEDURE — 97110 THERAPEUTIC EXERCISES: CPT | Mod: GP | Performed by: PHYSICAL THERAPIST

## 2017-06-19 PROCEDURE — 40000257 ZZH STATISTIC CONSULT NO CHARGE VASC ACCESS

## 2017-06-19 PROCEDURE — 93010 ELECTROCARDIOGRAM REPORT: CPT | Performed by: INTERNAL MEDICINE

## 2017-06-19 PROCEDURE — 84145 PROCALCITONIN (PCT): CPT | Performed by: PHYSICAL MEDICINE & REHABILITATION

## 2017-06-19 PROCEDURE — 85652 RBC SED RATE AUTOMATED: CPT | Performed by: STUDENT IN AN ORGANIZED HEALTH CARE EDUCATION/TRAINING PROGRAM

## 2017-06-19 PROCEDURE — 85027 COMPLETE CBC AUTOMATED: CPT | Performed by: PHYSICIAN ASSISTANT

## 2017-06-19 PROCEDURE — 87070 CULTURE OTHR SPECIMN AEROBIC: CPT | Performed by: PHYSICAL MEDICINE & REHABILITATION

## 2017-06-19 PROCEDURE — 25000125 ZZHC RX 250: Performed by: STUDENT IN AN ORGANIZED HEALTH CARE EDUCATION/TRAINING PROGRAM

## 2017-06-19 PROCEDURE — A9585 GADOBUTROL INJECTION: HCPCS | Performed by: RADIOLOGY

## 2017-06-19 PROCEDURE — 40000556 ZZH STATISTIC PERIPHERAL IV START W US GUIDANCE

## 2017-06-19 PROCEDURE — 83735 ASSAY OF MAGNESIUM: CPT | Performed by: STUDENT IN AN ORGANIZED HEALTH CARE EDUCATION/TRAINING PROGRAM

## 2017-06-19 PROCEDURE — 87205 SMEAR GRAM STAIN: CPT | Performed by: PHYSICAL MEDICINE & REHABILITATION

## 2017-06-19 PROCEDURE — 25000125 ZZHC RX 250: Performed by: INTERNAL MEDICINE

## 2017-06-19 PROCEDURE — 21400006 ZZH R&B CCU INTERMEDIATE UMMC

## 2017-06-19 PROCEDURE — 00000146 ZZHCL STATISTIC GLUCOSE BY METER IP

## 2017-06-19 PROCEDURE — 73223 MRI JOINT UPR EXTR W/O&W/DYE: CPT | Mod: LT

## 2017-06-19 PROCEDURE — 84145 PROCALCITONIN (PCT): CPT | Performed by: PHYSICIAN ASSISTANT

## 2017-06-19 PROCEDURE — 87086 URINE CULTURE/COLONY COUNT: CPT | Performed by: PHYSICAL MEDICINE & REHABILITATION

## 2017-06-19 PROCEDURE — 85027 COMPLETE CBC AUTOMATED: CPT | Performed by: STUDENT IN AN ORGANIZED HEALTH CARE EDUCATION/TRAINING PROGRAM

## 2017-06-19 PROCEDURE — 40000193 ZZH STATISTIC PT WARD VISIT: Performed by: PHYSICAL THERAPIST

## 2017-06-19 PROCEDURE — 83605 ASSAY OF LACTIC ACID: CPT | Performed by: PHYSICAL MEDICINE & REHABILITATION

## 2017-06-19 PROCEDURE — 25000128 H RX IP 250 OP 636: Performed by: INTERNAL MEDICINE

## 2017-06-19 PROCEDURE — 25000132 ZZH RX MED GY IP 250 OP 250 PS 637: Performed by: STUDENT IN AN ORGANIZED HEALTH CARE EDUCATION/TRAINING PROGRAM

## 2017-06-19 PROCEDURE — 80048 BASIC METABOLIC PNL TOTAL CA: CPT | Performed by: STUDENT IN AN ORGANIZED HEALTH CARE EDUCATION/TRAINING PROGRAM

## 2017-06-19 PROCEDURE — 86140 C-REACTIVE PROTEIN: CPT | Performed by: STUDENT IN AN ORGANIZED HEALTH CARE EDUCATION/TRAINING PROGRAM

## 2017-06-19 PROCEDURE — 87075 CULTR BACTERIA EXCEPT BLOOD: CPT | Performed by: PHYSICAL MEDICINE & REHABILITATION

## 2017-06-19 PROCEDURE — 25000128 H RX IP 250 OP 636

## 2017-06-19 PROCEDURE — 36415 COLL VENOUS BLD VENIPUNCTURE: CPT | Performed by: PHYSICIAN ASSISTANT

## 2017-06-19 PROCEDURE — 87040 BLOOD CULTURE FOR BACTERIA: CPT | Performed by: PHYSICAL MEDICINE & REHABILITATION

## 2017-06-19 PROCEDURE — 83605 ASSAY OF LACTIC ACID: CPT | Performed by: STUDENT IN AN ORGANIZED HEALTH CARE EDUCATION/TRAINING PROGRAM

## 2017-06-19 PROCEDURE — 85730 THROMBOPLASTIN TIME PARTIAL: CPT | Performed by: STUDENT IN AN ORGANIZED HEALTH CARE EDUCATION/TRAINING PROGRAM

## 2017-06-19 PROCEDURE — 87077 CULTURE AEROBIC IDENTIFY: CPT | Performed by: PHYSICAL MEDICINE & REHABILITATION

## 2017-06-19 PROCEDURE — 99233 SBSQ HOSP IP/OBS HIGH 50: CPT | Performed by: INTERNAL MEDICINE

## 2017-06-19 PROCEDURE — 25000128 H RX IP 250 OP 636: Performed by: RADIOLOGY

## 2017-06-19 PROCEDURE — 81001 URINALYSIS AUTO W/SCOPE: CPT | Performed by: PHYSICAL MEDICINE & REHABILITATION

## 2017-06-19 PROCEDURE — 80053 COMPREHEN METABOLIC PANEL: CPT | Performed by: PHYSICIAN ASSISTANT

## 2017-06-19 PROCEDURE — 25000128 H RX IP 250 OP 636: Performed by: PHYSICAL MEDICINE & REHABILITATION

## 2017-06-19 PROCEDURE — 36415 COLL VENOUS BLD VENIPUNCTURE: CPT | Performed by: STUDENT IN AN ORGANIZED HEALTH CARE EDUCATION/TRAINING PROGRAM

## 2017-06-19 PROCEDURE — 27210437 ZZH NUTRITION PRODUCT SEMIELEM INTERMED LITER

## 2017-06-19 PROCEDURE — 93005 ELECTROCARDIOGRAM TRACING: CPT

## 2017-06-19 PROCEDURE — 73070 X-RAY EXAM OF ELBOW: CPT | Mod: LT

## 2017-06-19 PROCEDURE — 99223 1ST HOSP IP/OBS HIGH 75: CPT | Mod: GC | Performed by: INTERNAL MEDICINE

## 2017-06-19 PROCEDURE — 36415 COLL VENOUS BLD VENIPUNCTURE: CPT | Performed by: PHYSICAL MEDICINE & REHABILITATION

## 2017-06-19 PROCEDURE — 85610 PROTHROMBIN TIME: CPT | Performed by: STUDENT IN AN ORGANIZED HEALTH CARE EDUCATION/TRAINING PROGRAM

## 2017-06-19 RX ORDER — MULTIPLE VITAMINS W/ MINERALS TAB 9MG-400MCG
1 TAB ORAL DAILY
Qty: 30 EACH | Refills: 0 | Status: ON HOLD | DISCHARGE
Start: 2017-06-19 | End: 2017-08-04

## 2017-06-19 RX ORDER — ZINC SULFATE 50(220)MG
220 CAPSULE ORAL DAILY
Status: ON HOLD | DISCHARGE
Start: 2017-06-19 | End: 2017-08-04

## 2017-06-19 RX ORDER — SODIUM CHLORIDE 9 MG/ML
INJECTION, SOLUTION INTRAVENOUS
Status: COMPLETED
Start: 2017-06-19 | End: 2017-06-19

## 2017-06-19 RX ORDER — POTASSIUM CHLORIDE 7.45 MG/ML
10 INJECTION INTRAVENOUS
Status: DISCONTINUED | OUTPATIENT
Start: 2017-06-19 | End: 2017-07-27

## 2017-06-19 RX ORDER — BETA-CAROTENE 7500 MCG
25000 CAPSULE ORAL DAILY
Status: ON HOLD | DISCHARGE
Start: 2017-06-19 | End: 2017-08-04

## 2017-06-19 RX ORDER — AMOXICILLIN 250 MG
1-2 CAPSULE ORAL 2 TIMES DAILY
Status: DISCONTINUED | OUTPATIENT
Start: 2017-06-19 | End: 2017-06-19

## 2017-06-19 RX ORDER — ASCORBIC ACID 500 MG
500 TABLET ORAL DAILY
Qty: 30 TABLET | Status: ON HOLD | DISCHARGE
Start: 2017-06-19 | End: 2017-08-04

## 2017-06-19 RX ORDER — ATENOLOL 50 MG/1
50 TABLET ORAL DAILY
Status: DISCONTINUED | OUTPATIENT
Start: 2017-06-20 | End: 2017-07-06

## 2017-06-19 RX ORDER — POTASSIUM CHLORIDE 750 MG/1
20-40 TABLET, EXTENDED RELEASE ORAL
Status: DISCONTINUED | OUTPATIENT
Start: 2017-06-19 | End: 2017-07-27

## 2017-06-19 RX ORDER — ATENOLOL 50 MG/1
50 TABLET ORAL
Status: DISCONTINUED | OUTPATIENT
Start: 2017-06-19 | End: 2017-06-19 | Stop reason: HOSPADM

## 2017-06-19 RX ORDER — SODIUM CHLORIDE 9 MG/ML
INJECTION, SOLUTION INTRAVENOUS CONTINUOUS
Status: DISCONTINUED | OUTPATIENT
Start: 2017-06-19 | End: 2017-06-19

## 2017-06-19 RX ORDER — GADOBUTROL 604.72 MG/ML
7.5 INJECTION INTRAVENOUS ONCE
Status: COMPLETED | OUTPATIENT
Start: 2017-06-19 | End: 2017-06-19

## 2017-06-19 RX ORDER — TRIAMCINOLONE ACETONIDE 1 MG/G
OINTMENT TOPICAL 2 TIMES DAILY
Status: DISCONTINUED | OUTPATIENT
Start: 2017-06-19 | End: 2017-07-14

## 2017-06-19 RX ORDER — LIDOCAINE 40 MG/G
CREAM TOPICAL
Status: DISCONTINUED | OUTPATIENT
Start: 2017-06-19 | End: 2017-07-28

## 2017-06-19 RX ORDER — ACETAMINOPHEN 325 MG/1
650 TABLET ORAL EVERY 4 HOURS PRN
Status: DISCONTINUED | OUTPATIENT
Start: 2017-06-19 | End: 2017-06-20

## 2017-06-19 RX ORDER — GABAPENTIN 100 MG/1
200 CAPSULE ORAL 3 TIMES DAILY
Status: DISCONTINUED | OUTPATIENT
Start: 2017-06-19 | End: 2017-08-04 | Stop reason: HOSPADM

## 2017-06-19 RX ORDER — ASCORBIC ACID 500 MG
500 TABLET ORAL DAILY
Status: DISPENSED | OUTPATIENT
Start: 2017-06-19 | End: 2017-06-28

## 2017-06-19 RX ORDER — NALOXONE HYDROCHLORIDE 0.4 MG/ML
.1-.4 INJECTION, SOLUTION INTRAMUSCULAR; INTRAVENOUS; SUBCUTANEOUS
Status: DISCONTINUED | OUTPATIENT
Start: 2017-06-19 | End: 2017-08-04 | Stop reason: HOSPADM

## 2017-06-19 RX ORDER — POLYETHYLENE GLYCOL 3350 17 G/17G
17 POWDER, FOR SOLUTION ORAL DAILY PRN
Status: DISCONTINUED | OUTPATIENT
Start: 2017-06-19 | End: 2017-06-19

## 2017-06-19 RX ORDER — DEXTROSE MONOHYDRATE 25 G/50ML
25-50 INJECTION, SOLUTION INTRAVENOUS
Status: DISCONTINUED | OUTPATIENT
Start: 2017-06-19 | End: 2017-07-30

## 2017-06-19 RX ORDER — PREDNISONE 10 MG/1
10 TABLET ORAL DAILY
Status: ON HOLD | DISCHARGE
Start: 2017-06-19 | End: 2017-08-04

## 2017-06-19 RX ORDER — CETIRIZINE HYDROCHLORIDE 10 MG/1
10 TABLET ORAL DAILY
Status: DISCONTINUED | OUTPATIENT
Start: 2017-06-20 | End: 2017-06-27

## 2017-06-19 RX ORDER — PIPERACILLIN SODIUM, TAZOBACTAM SODIUM 3; .375 G/15ML; G/15ML
3.38 INJECTION, POWDER, LYOPHILIZED, FOR SOLUTION INTRAVENOUS EVERY 6 HOURS
Status: DISCONTINUED | OUTPATIENT
Start: 2017-06-19 | End: 2017-06-19

## 2017-06-19 RX ORDER — LANOLIN ALCOHOL/MO/W.PET/CERES
3 CREAM (GRAM) TOPICAL
Status: DISCONTINUED | OUTPATIENT
Start: 2017-06-19 | End: 2017-07-28

## 2017-06-19 RX ORDER — MULTIPLE VITAMINS W/ MINERALS TAB 9MG-400MCG
1 TAB ORAL DAILY
Status: DISCONTINUED | OUTPATIENT
Start: 2017-06-19 | End: 2017-08-04 | Stop reason: HOSPADM

## 2017-06-19 RX ORDER — LIDOCAINE 40 MG/G
CREAM TOPICAL
Status: DISCONTINUED | OUTPATIENT
Start: 2017-06-19 | End: 2017-06-19 | Stop reason: HOSPADM

## 2017-06-19 RX ORDER — BETA-CAROTENE 7500 MCG
25000 CAPSULE ORAL DAILY
Status: DISPENSED | OUTPATIENT
Start: 2017-06-19 | End: 2017-06-28

## 2017-06-19 RX ORDER — LANOLIN ALCOHOL/MO/W.PET/CERES
3 CREAM (GRAM) TOPICAL
Status: ON HOLD | DISCHARGE
Start: 2017-06-19 | End: 2017-08-04

## 2017-06-19 RX ORDER — PREDNISONE 10 MG/1
10 TABLET ORAL DAILY
Status: DISCONTINUED | OUTPATIENT
Start: 2017-06-20 | End: 2017-06-30

## 2017-06-19 RX ORDER — PREDNISONE 5 MG/1
5 TABLET ORAL DAILY
Status: ON HOLD | DISCHARGE
Start: 2017-07-15 | End: 2017-08-04

## 2017-06-19 RX ORDER — ERTAPENEM 1 G/1
1 INJECTION, POWDER, LYOPHILIZED, FOR SOLUTION INTRAMUSCULAR; INTRAVENOUS EVERY 24 HOURS
Status: DISCONTINUED | OUTPATIENT
Start: 2017-06-19 | End: 2017-06-19 | Stop reason: HOSPADM

## 2017-06-19 RX ORDER — ERTAPENEM 1 G/1
1 INJECTION, POWDER, LYOPHILIZED, FOR SOLUTION INTRAMUSCULAR; INTRAVENOUS EVERY 24 HOURS
Status: DISCONTINUED | OUTPATIENT
Start: 2017-06-20 | End: 2017-06-27

## 2017-06-19 RX ORDER — POTASSIUM CL/LIDO/0.9 % NACL 10MEQ/0.1L
10 INTRAVENOUS SOLUTION, PIGGYBACK (ML) INTRAVENOUS
Status: DISCONTINUED | OUTPATIENT
Start: 2017-06-19 | End: 2017-07-27

## 2017-06-19 RX ORDER — FOLIC ACID 1 MG/1
1 TABLET ORAL DAILY
Status: DISCONTINUED | OUTPATIENT
Start: 2017-06-19 | End: 2017-07-10

## 2017-06-19 RX ORDER — ACETAMINOPHEN 650 MG/1
650 SUPPOSITORY RECTAL EVERY 4 HOURS PRN
Status: DISCONTINUED | OUTPATIENT
Start: 2017-06-19 | End: 2017-07-28

## 2017-06-19 RX ORDER — ZINC SULFATE 50(220)MG
220 CAPSULE ORAL DAILY
Status: DISPENSED | OUTPATIENT
Start: 2017-06-19 | End: 2017-06-28

## 2017-06-19 RX ORDER — NICOTINE POLACRILEX 4 MG
15-30 LOZENGE BUCCAL
Status: DISCONTINUED | OUTPATIENT
Start: 2017-06-19 | End: 2017-07-30

## 2017-06-19 RX ORDER — DIGOXIN 125 MCG
250 TABLET ORAL DAILY
Status: DISCONTINUED | OUTPATIENT
Start: 2017-06-20 | End: 2017-06-20

## 2017-06-19 RX ORDER — ACETAMINOPHEN 325 MG/1
650 TABLET ORAL EVERY 4 HOURS PRN
Status: DISCONTINUED | OUTPATIENT
Start: 2017-06-19 | End: 2017-07-08

## 2017-06-19 RX ORDER — ERTAPENEM 1 G/1
1 INJECTION, POWDER, LYOPHILIZED, FOR SOLUTION INTRAMUSCULAR; INTRAVENOUS EVERY 24 HOURS
Qty: 10 EACH | Status: ON HOLD | DISCHARGE
Start: 2017-06-19 | End: 2017-08-04

## 2017-06-19 RX ORDER — POTASSIUM CHLORIDE 1.5 G/1.58G
20-40 POWDER, FOR SOLUTION ORAL
Status: DISCONTINUED | OUTPATIENT
Start: 2017-06-19 | End: 2017-07-27

## 2017-06-19 RX ORDER — PREDNISONE 2.5 MG/1
7.5 TABLET ORAL DAILY
Status: ON HOLD | DISCHARGE
Start: 2017-07-01 | End: 2017-08-04

## 2017-06-19 RX ORDER — POTASSIUM CHLORIDE 29.8 MG/ML
20 INJECTION INTRAVENOUS
Status: DISCONTINUED | OUTPATIENT
Start: 2017-06-19 | End: 2017-07-27

## 2017-06-19 RX ORDER — MAGNESIUM SULFATE HEPTAHYDRATE 40 MG/ML
4 INJECTION, SOLUTION INTRAVENOUS EVERY 4 HOURS PRN
Status: DISCONTINUED | OUTPATIENT
Start: 2017-06-19 | End: 2017-08-02

## 2017-06-19 RX ADMIN — APIXABAN 2.5 MG: 2.5 TABLET, FILM COATED ORAL at 20:50

## 2017-06-19 RX ADMIN — APIXABAN 2.5 MG: 2.5 TABLET, FILM COATED ORAL at 08:29

## 2017-06-19 RX ADMIN — ACETAMINOPHEN 650 MG: 325 TABLET, FILM COATED ORAL at 21:11

## 2017-06-19 RX ADMIN — ATENOLOL 50 MG: 50 TABLET ORAL at 08:30

## 2017-06-19 RX ADMIN — TRIAMCINOLONE ACETONIDE: 1 OINTMENT TOPICAL at 09:26

## 2017-06-19 RX ADMIN — SODIUM CHLORIDE 50 ML: 9 INJECTION, SOLUTION INTRAVENOUS at 11:08

## 2017-06-19 RX ADMIN — INSULIN ASPART 2 UNITS: 100 INJECTION, SOLUTION INTRAVENOUS; SUBCUTANEOUS at 12:23

## 2017-06-19 RX ADMIN — TRAMADOL HYDROCHLORIDE 25 MG: 50 TABLET, FILM COATED ORAL at 23:32

## 2017-06-19 RX ADMIN — SODIUM CHLORIDE 500 ML: 9 INJECTION, SOLUTION INTRAVENOUS at 23:32

## 2017-06-19 RX ADMIN — INSULIN ASPART 1 UNITS: 100 INJECTION, SOLUTION INTRAVENOUS; SUBCUTANEOUS at 09:06

## 2017-06-19 RX ADMIN — Medication 2 G: at 20:50

## 2017-06-19 RX ADMIN — ERTAPENEM SODIUM 1 G: 1 INJECTION, POWDER, LYOPHILIZED, FOR SOLUTION INTRAMUSCULAR; INTRAVENOUS at 11:02

## 2017-06-19 RX ADMIN — Medication: at 09:08

## 2017-06-19 RX ADMIN — LIDOCAINE HYDROCHLORIDE 0.2 ML: 10 INJECTION, SOLUTION INFILTRATION; PERINEURAL at 10:50

## 2017-06-19 RX ADMIN — ACETAMINOPHEN 650 MG: 325 TABLET, FILM COATED ORAL at 07:13

## 2017-06-19 RX ADMIN — DIGOXIN 250 MCG: 250 TABLET ORAL at 06:02

## 2017-06-19 RX ADMIN — GADOBUTROL 7.5 ML: 604.72 INJECTION INTRAVENOUS at 19:55

## 2017-06-19 RX ADMIN — GABAPENTIN 200 MG: 100 CAPSULE ORAL at 15:56

## 2017-06-19 RX ADMIN — VANCOMYCIN HYDROCHLORIDE 1500 MG: 10 INJECTION, POWDER, LYOPHILIZED, FOR SOLUTION INTRAVENOUS at 12:05

## 2017-06-19 RX ADMIN — GABAPENTIN 200 MG: 100 CAPSULE ORAL at 20:50

## 2017-06-19 RX ADMIN — GABAPENTIN 200 MG: 100 CAPSULE ORAL at 08:29

## 2017-06-19 RX ADMIN — PREDNISONE 10 MG: 10 TABLET ORAL at 08:30

## 2017-06-19 RX ADMIN — TRIAMCINOLONE ACETONIDE: 1 OINTMENT TOPICAL at 20:50

## 2017-06-19 RX ADMIN — CETIRIZINE HYDROCHLORIDE 10 MG: 10 TABLET, FILM COATED ORAL at 08:30

## 2017-06-19 RX ADMIN — Medication 1 MG: at 22:10

## 2017-06-19 ASSESSMENT — PAIN DESCRIPTION - DESCRIPTORS
DESCRIPTORS: BURNING;CONSTANT
DESCRIPTORS: BURNING;CONSTANT

## 2017-06-19 NOTE — PLAN OF CARE
Problem: Goal Outcome Summary  Goal: Goal Outcome Summary  Outcome: No Change  Assumed care of patient upon transfer from ARU at 1450. A&Ox4. Denied pain except for discomfort associated with LUE wound. Afib rates 70-90s. Life vest removed for MRI. Needs to be clarified if patient needs to continue to wear it while inpatient. Lungs clear with dim bases on RA. NPO pending tests. No SSI given. Was on tube feeds from 4pm-12pm prior to admission. Need to clarify when to restart. DD3 with thin liquids pending speech eval. Up with Ax1 and walker. Incontinent of urine x1. Continent at times. LUE with wound. R-shoulder with biopsy site. Rash noted in L axilla. BLE edema with lymph wraps in place. R-heel wound needs to be assessed when wraps removed for lymphedema assessment. Plan for patient to have MRI of left elbow. MRI patient history completed with  and sent. Patient to MRI at 1845. Continue with current plan of care and notify MD of any questions or concerns.

## 2017-06-19 NOTE — PLAN OF CARE
Problem: Goal/Outcome  Goal: Goal Outcome Summary  Physical Therapy Discharge Summary     Reason for therapy discharge:    Change in medical status.     Progress towards therapy goal(s). See goals on Care Plan in [facility] electronic health record for goal details.  Goals partially met.  Barriers to achieving goals:   discharge from facility.     Therapy recommendation(s):    Continue therapy in hospital once cleared by medical team for sepsis.

## 2017-06-19 NOTE — PROVIDER NOTIFICATION
DATE:  6/19/2017   TIME OF RECEIPT FROM LAB:  1650  LAB TEST:  Platelet  LAB VALUE:  44  RESULTS GIVEN WITH READ-BACK TO (PROVIDER):  Verbally informed Dr. Montanez of platelet count.  TIME LAB VALUE REPORTED TO PROVIDER:   8516    No new orders at this time.

## 2017-06-19 NOTE — PLAN OF CARE
Problem: Goal/Outcome  Goal: Goal Outcome Summary  Outcome: No Change  Patient talked about the burning pain present LUE, but wished to hold off on taking the Oxycodone until close to bedtime.  She was incontinent of urine. She did call with urge to void, but was unable to keep from voiding.  Nan skin intact.  She is on a calorie count, and did demonstrate a fair appetite at supper meal.  Cycled tube feeding is being delivered, and she is tolerating it well.  She dozed off and on during the shift, seeming sleepier early in the evening.  Spouse, Wolf,  was here, and she was in good spirits during his visit.  Life Vest in place.

## 2017-06-19 NOTE — PROGRESS NOTES
[facility] will handle post discharge follow up cares and coordination            Plan:    Anticipated Disposition:  Facility:  [facility]

## 2017-06-19 NOTE — PLAN OF CARE
Problem: Goal/Outcome  Goal: Goal Outcome Summary  Speech Language Therapy Discharge Summary     Reason for therapy discharge:    Discharged to acute care hospitalization     Progress towards therapy goal(s). See goals on Care Plan in Epic electronic health record for goal details.  Goals partially met.  Barriers to achieving goals:   discharge from facility.     Therapy recommendation(s):    Continued therapy is recommended.  Rationale/Recommendations:  when medically appropriate.      Patient appropriate for ongoing therapy to assess swallowing function and potential for diet upgrade to regular textures as well as to address cognition. Will plan to resume treatment upon facility return.

## 2017-06-19 NOTE — PHARMACY-VANCOMYCIN DOSING SERVICE
Pharmacy Vancomycin Initial Note  Date of Service 2017  Patient's  1960  56 year old, female    Indication: Sepsis. Possibly related to SSTI    Current estimated CrCl = Estimated Creatinine Clearance: 113.7 mL/min (based on Cr of 0.66).    Creatinine for last 3 days  2017:  6:08 AM Creatinine 0.66 mg/dL    Recent Vancomycin Level(s) for last 3 days  No results found for requested labs within last 72 hours.      Vancomycin IV Administrations (past 72 hours)      No vancomycin orders with administrations in past 72 hours.                Nephrotoxins and other renal medications (Future)    Start     Dose/Rate Route Frequency Ordered Stop    17 0830  vancomycin (VANCOCIN) 1,500 mg in NaCl 0.9 % 250 mL intermittent infusion      1,500 mg Intravenous EVERY 12 HOURS 17 0835            Contrast Orders - past 72 hours     None                Plan:  1.  Start vancomycin  1500 mg IV q12h.   2.  Goal Trough Level: 15-20 mg/L   3.  Pharmacy will check trough levels as appropriate in 1-3 Days.    4. Serum creatinine levels will be ordered daily for the first week of therapy and at least twice weekly for subsequent weeks.    5. [place] method utilized to dose vancomycin therapy: Method 2    Jagruti Quezada

## 2017-06-19 NOTE — PHARMACY-VANCOMYCIN DOSING SERVICE
Pharmacy Vancomycin Initial Note  Date of Service 2017  Patient's  1960  56 year old, female    Indication: Sepsis    Current estimated CrCl = Estimated Creatinine Clearance: 114.6 mL/min (based on Cr of 0.66).    Creatinine for last 3 days  2017:  6:08 AM Creatinine 0.66 mg/dL    Recent Vancomycin Level(s) for last 3 days  No results found for requested labs within last 72 hours.      Vancomycin IV Administrations (past 72 hours)                   vancomycin (VANCOCIN) 1,500 mg in NaCl 0.9 % 250 mL intermittent infusion (mg) 1,500 mg New Bag 17 1205                Nephrotoxins and other renal medications (Future)    Start     Dose/Rate Route Frequency Ordered Stop    17 2200  vancomycin (VANCOCIN) 1,500 mg in NaCl 0.9 % 250 mL intermittent infusion      1,500 mg  over 90 Minutes Intravenous EVERY 12 HOURS 17 1458            Contrast Orders - past 72 hours     None                Plan:  1.  Will continue vancomycin 1500 mg IV q12h (started at rehab).   2.  Goal Trough Level: 15-20 mg/L   3.  Pharmacy will check trough levels as appropriate in 1-3 Days.      Cynthia Francis, PharmD, pager [phone]

## 2017-06-19 NOTE — CONSULTS
General Surgery Consult Note    Staff: Dr. Walker  Date: 6/19/2017   Consulted for: LUE wound by Dr. Garcia    Assessment/Plan:  56 year old female with history of RA, HTN, A. Fib and cardiac arrest who presents with fever from rehab facility. She has had a LUE wound since the end of May related to an IV infiltration. There is an eschar and fibrinous material overlying the wound, no purulent drainage. No surrounding indications for infection so no need for debridement or drainage.     - MRI to rule out osteomyelitis  - No surgical indication at this time  - Local wound cares  - Defer antibiotic decision to primary team    Discussed with Dr. Aaron Ch MD  General Surgery, PGY-1  Pg [phone]    ------------------------------------------  HPI:   Amelia Michel is a 56 year old female with history of RA, HTN, A. Fib and cardiac arrest on unknown etiology with subsequent prolonged hospital stay including therapeutic hypothermia and MOF who is being evaluated for LUE wound. This wound began at the end of May around memorial day when an IV infiltrated with an unknown pressor. Wound has not healed since that time. Vascular consulted at the time of infiltration and determined there were no interventions needed at this time. Patient was in rehab and developed fevers to 102F. Also had chills. No localizing symptoms. No cough or dysuria. Cultures of left arm were taking.   ?  Review of Systems:  ROS: 10 point ROS neg other than the symptoms noted above in the HPI.    PMH:  Past Medical History:   Diagnosis Date     Hypertension      Rheumatoid arthritis(714.0)         PSHx:  Past Surgical History:   Procedure Laterality Date     ANESTHESIA CARDIOVERSION N/A 5/17/2017    Procedure: ANESTHESIA CARDIOVERSION;  ANESTHESIA CARDIOVERSION;  Surgeon: GENERIC ANESTHESIA PROVIDER;  Location: [facility] OR     ARTHRODESIS WRIST  2000    Right wrist     FOOT SURGERY      4 left and 2 right     RELEASE CARPAL TUNNEL  BILATERAL       REPAIR VENTRICULAR SEPTAL DEFECT  1969        Medications:    Current Facility-Administered Medications on File Prior to Encounter:  [COMPLETED] NaCl 0.9 % infusion   [DISCONTINUED] 0.9% sodium chloride infusion   [DISCONTINUED] 0.9% sodium chloride infusion   [DISCONTINUED] 0.9% sodium chloride infusion   [DISCONTINUED] piperacillin-tazobactam (ZOSYN) 3.375 g vial to attach to  mL bag   [DISCONTINUED] vancomycin (VANCOCIN) 1,250 mg in NaCl 0.9 % 250 mL intermittent infusion   [DISCONTINUED] 0.9% sodium chloride BOLUS   [DISCONTINUED] ertapenem (INVanz) 1 g vial to attach to  mL bag   [DISCONTINUED] HOLD MEDICATION   [DISCONTINUED] vancomycin (VANCOCIN) 1,500 mg in NaCl 0.9 % 250 mL intermittent infusion   [DISCONTINUED] atenolol (TENORMIN) tablet 50 mg   [DISCONTINUED] lidocaine 1 % 1 mL   [DISCONTINUED] lidocaine (LMX4) kit   [DISCONTINUED] lidocaine 1 % 1 mL   [DISCONTINUED] lidocaine (LMX4) kit   [DISCONTINUED] sodium chloride (PF) 0.9% PF flush 3 mL   [DISCONTINUED] sodium chloride (PF) 0.9% PF flush 3 mL   [DISCONTINUED] oxyCODONE (ROXICODONE) IR tablet 5 mg   [DISCONTINUED] oxyCODONE (ROXICODONE) IR tablet 5 mg   [DISCONTINUED] morphine 0.1% in solosite topical gel   [DISCONTINUED] ascorbic acid (VITAMIN C) tablet 500 mg   [DISCONTINUED] beta carotene capsule 25,000 Units   [DISCONTINUED] zinc sulfate (ZINCATE) capsule 220 mg   [DISCONTINUED] senna-docusate (SENOKOT-S;PERICOLACE) 8.6-50 MG per tablet 1-2 tablet   [DISCONTINUED] digoxin (LANOXIN) tablet 250 mcg   [DISCONTINUED] acetaminophen (TYLENOL) tablet 650 mg   [DISCONTINUED] apixaban ANTICOAGULANT (ELIQUIS) tablet 2.5 mg   [DISCONTINUED] atenolol (TENORMIN) tablet 50 mg   [DISCONTINUED] calcium-vitamin D (CALTRATE) 600-400 MG-UNIT per tablet 2 tablet   [DISCONTINUED] cetirizine (zyrTEC) tablet 10 mg   [DISCONTINUED] co-enzyme Q-10 capsule 30 mg   [DISCONTINUED] folic acid (FOLVITE) tablet 1 mg   [DISCONTINUED] gabapentin  (NEURONTIN) capsule 200 mg   [DISCONTINUED] melatonin tablet 3 mg   [DISCONTINUED] multivitamin, therapeutic with minerals (THERA-VIT-M) tablet 1 tablet   [DISCONTINUED] predniSONE (DELTASONE) tablet 10 mg   [DISCONTINUED] triamcinolone (KENALOG) 0.1 % ointment   [DISCONTINUED] predniSONE (DELTASONE) tablet 7.5 mg   [DISCONTINUED] predniSONE (DELTASONE) tablet 5 mg   [DISCONTINUED] Patient is already receiving anticoagulation with heparin, enoxaparin (LOVENOX), warfarin (COUMADIN)  or other anticoagulant medication   [DISCONTINUED] naloxone (NARCAN) injection 0.1-0.4 mg   [DISCONTINUED] glucose 40 % gel 15-30 g   [DISCONTINUED] dextrose 50 % injection 25-50 mL   [DISCONTINUED] glucagon injection 1 mg   [DISCONTINUED] insulin aspart (NovoLOG) inj (RAPID ACTING)   [DISCONTINUED] insulin aspart (NovoLOG) inj (RAPID ACTING)   [DISCONTINUED] dextrose 10 % 1,000 mL infusion   [DISCONTINUED] melatonin tablet 1 mg     Current Outpatient Prescriptions on File Prior to Encounter:  ertapenem (INVANZ) 1 GM vial Inject 1 g into the vein every 24 hours   insulin aspart (NOVOLOG PEN) 100 UNIT/ML injection Inject 1-7 Units Subcutaneous 3 times daily (before meals)   insulin aspart (NOVOLOG PEN) 100 UNIT/ML injection Inject 1-5 Units Subcutaneous At Bedtime   melatonin 3 MG tablet Take 1 tablet (3 mg) by mouth nightly as needed for sleep   melatonin 1 MG TABS tablet Take 1 tablet (1 mg) by mouth At Bedtime   morphine 0.1% in solosite topical gel Place 2 g onto the skin 3 times daily   multivitamin, therapeutic with minerals (THERA-VIT-M) TABS tablet Take 1 tablet by mouth daily   [START ON 7/15/2017] predniSONE (DELTASONE) 5 MG tablet Take 1 tablet (5 mg) by mouth daily   predniSONE (DELTASONE) 10 MG tablet Take 1 tablet (10 mg) by mouth daily   [START ON 7/1/2017] predniSONE (DELTASONE) 2.5 MG tablet Take 3 tablets (7.5 mg) by mouth daily   vancomycin 1,500 mg Inject 1,500 mg into the vein every 12 hours   zinc sulfate (ZINCATE)  220 (50 ZN) MG capsule Take 1 capsule (220 mg) by mouth daily   ascorbic acid 500 MG TABS Take 1 tablet (500 mg) by mouth daily   beta carotene 16865 UNIT capsule Take 1 capsule (25,000 Units) by mouth daily   acetaminophen (TYLENOL) 325 MG tablet Take 2 tablets (650 mg) by mouth every 4 hours as needed for mild pain   apixaban ANTICOAGULANT (ELIQUIS) 2.5 MG tablet Take 1 tablet (2.5 mg) by mouth 2 times daily   cetirizine (ZYRTEC) 10 MG tablet Take 1 tablet (10 mg) by mouth daily   atenolol (TENORMIN) 50 MG tablet Take 1 tablet (50 mg) by mouth daily   digoxin (LANOXIN) 250 MCG tablet Take 1 tablet (250 mcg) by mouth daily   triamcinolone (KENALOG) 0.1 % ointment Apply topically 2 times daily   senna-docusate (SENOKOT-S;PERICOLACE) 8.6-50 MG per tablet Take 1-2 tablets by mouth 2 times daily   Calcium Carb-Cholecalciferol 600-800 MG-UNIT TABS Take 2 tablets by mouth every morning   Coenzyme Q10 (COQ-10 PO) Take 1 tablet by mouth daily In the morning   gabapentin (NEURONTIN) 100 MG capsule Take 2 capsules (200 mg) by mouth 3 times daily (Patient taking differently: Take 200 mg by mouth Take 2 capsules (200 mg) by mouth every 6 hours (4 am, 10am, 4pm, 10pm))   folic acid (FOLVITE) 1 MG tablet Take 1 mg by mouth daily.       Allergies:   Penicillins and Tape [adhesive tape]     SocHx:  Social History     Social History     Marital status:      Spouse name: Romeo Michel     Number of children: 0     Years of education: N/A     Occupational History      [employer]     Social History Main Topics     Smoking status: Never Smoker     Smokeless tobacco: Never Used     Alcohol use Yes      Comment: Glass of wine two evenings a night     Drug use: No     Sexual activity: Not on file     Other Topics Concern     Parent/Sibling W/ Cabg, Mi Or Angioplasty Before 65f 55m? No     Social History Narrative     FamHx:  Family History   Problem Relation Age of Onset     Breast Cancer Mother       "Hypertension Mother      Alzheimer Disease Mother      Hypertension Father      Blood Disease Father      Lymphoa     Circulatory Father      A Fib     DIABETES Paternal Grandmother       Negative for bleeding disorders, clotting disorders, or problems with anesthesia    Physical Examination   BP 95/62 (BP Location: Right arm)  Temp 97.6  F (36.4  C) (Oral)  Resp 16  Ht 1.48 m (4' 10.25\")  Wt 76.2 kg (168 lb 1.6 oz)  SpO2 100%  BMI 34.83 kg/m2  General: Awake and alert. NAD  Pulm: Non-labored breathing  CV: RRR  Musculoskel/Extremities: Radial pulse 2+, LUE wound at ventral elbow region with overlying eschar, no purulent material expressed. No surrounding erythema or area of fluctuance.    Labs: Reviewed   UA Few bacteria, +nitrite, - LE  WBC 2.5  Hgb 10.2  Plt 51  Lactate 3.1  Albumin 1.8  Cr 0.66    Imaging: Reviewed  Left elbow XR  Impression:   1. No radiographic evidence to suggest osteomyelitis.  2. Diffuse subcutaneous soft tissue stranding, may represent  cellulitis in the provided clinical setting.  "

## 2017-06-19 NOTE — IP AVS SNAPSHOT
Amelia Michel #[id number] (CSN: [id number])  (56 year old F)  (Adm: 17)     [id number]               UNIT [address] [place] [address]: [phone]            Patient Demographics     Patient Name Sex          Age SSN Address Phone    Amelia Michel Female 1960 (56 year old) [id number]  [facility] [address] [phone] (Home)  [phone] (Mobile) *Preferred*      Emergency Contact(s)     Name Relation Home Work Mobile    Romeo Michel Spouse [phone]        Admission Information     Attending Provider Admitting Provider Admission Type Admission Date/Time    Lenore Rodriguez MD Duprez, Selvin RICHTER MD Urgent 17  1454    Discharge Date Hospital Service Auth/Cert Status Service Area     Oncology [facility]    Unit Room/Bed Admission Status       [address] 5422/5422-01 Admission (Confirmed)       Admission     Complaint    Sepsis, Sepsis (H), Wound of left upper extremity, Wound of left upper extremity      Hospital Account     Name Acct ID Class Status Primary Coverage    Amelia Michel [id number] Inpatient Open PREFERREDONE - PEAK ADMIN SERV OPEN ACCESS            Guarantor Account (for Hospital Account #[id number])     Name Relation to Pt Service Area Active? Acct Type    Marilu Amelia EMELYN  FCS Yes Personal/Family    Address Phone          [address] [place] [address] [phone](H)              Coverage Information (for Hospital Account #[id number])     F/O Payor/Plan Precert #    PREFERREDONE/PEAK ADMIN SERV OPEN ACCESS     Subscriber Subscriber #    Amelia Michel [id number]    Address Phone    [address] [address]  [place] [address] [phone]                                                INTERAGENCY TRANSFER FORM - PHYSICIAN ORDERS   2017                       [address] [place] [address]: [phone]            Attending Provider: Lenore Rodriguez MD     Allergies:  Metoprolol, No Clinical  "Screening - See Comments, Penicillins, Tape [Adhesive Tape]    Infection:  ESBL   Service:  ONCOLOGY    Ht:  1.455 m (4' 9.28\")   Wt:  66 kg (145 lb 9.6 oz)   Admission Wt:  76.2 kg (168 lb 1.6 oz)    BMI:  31.2 kg/m 2   BSA:  1.63 m 2            ED Clinical Impression     Diagnosis Description Comment Added By Time Added    Diffuse large B-cell lymphoma of extranodal site (H) [C83.39] Diffuse large B-cell lymphoma of extranodal site (H) [C83.39]  Kalpana Davenport RN 7/27/2017  6:02 PM    Rheumatoid arthritis involving multiple sites with positive rheumatoid factor (H) [M05.79] Rheumatoid arthritis involving multiple sites with positive rheumatoid factor (H) [M05.79]  Shyann Marinelli PA-C 8/4/2017  9:56 AM    Paroxysmal atrial fibrillation (H) [I48.0] Paroxysmal atrial fibrillation (H) [I48.0]  Shyann Marinelli PA-C 8/4/2017  9:57 AM    Atrial tachycardia (H) [I47.1] Atrial tachycardia (H) [I47.1]  Shyann Marinelli PA-C 8/4/2017  9:57 AM    Other secondary hypertension [I15.8] Other secondary hypertension [I15.8]  Shyann Marinelli PA-C 8/4/2017  9:57 AM    Hypocalcemia [E83.51] Hypocalcemia [E83.51]  Shyann Marinelli PA-C 8/4/2017 10:18 AM    Generalized edema [R60.1] Generalized edema [R60.1]  Shyann Marinelli PA-C 8/4/2017 10:29 AM    Constipation, unspecified constipation type [K59.00] Constipation, unspecified constipation type [K59.00]  Shyann Marinelli PA-C 8/4/2017 10:30 AM    Cardiogenic shock (H) [R57.0] Cardiogenic shock (H) [R57.0]  Shyann Marinelli PA-C 8/4/2017 10:31 AM    Deficiency of nutrient element, unspecified [E61.9] Deficiency of nutrient element, unspecified [E61.9]  Shyann Marinelli PA-C 8/4/2017 10:32 AM    Insomnia, unspecified [G47.00] Insomnia, unspecified [G47.00]  Shyann Marinelli PA-C 8/4/2017 10:33 AM    Hyperglycemia [R73.9] Hyperglycemia [R73.9]  Shyann Marinelli PA-C 8/4/2017 10:34 AM    Mononeuropathy [G58.9] " Mononeuropathy [G58.9]  Shyann Marinelli PA-C 8/4/2017 10:35 AM    Folic acid deficiency [E53.8] Folic acid deficiency [E53.8]  Shyann Marinelli PA-C 8/4/2017 10:36 AM    Rash and nonspecific skin eruption [R21] Rash and nonspecific skin eruption [R21]  Shyann Marinelli PA-C 8/4/2017 10:39 AM    Arm pain, left [M79.602] Arm pain, left [M79.602]  Shyann Marinelli PA-C 8/4/2017 11:16 AM      Hospital Problems as of 8/4/2017              Priority Class Noted POA    Sepsis (H) Medium  6/19/2017 Yes    Wound of left upper extremity Medium  6/19/2017 Yes    Diffuse large B-cell lymphoma of extranodal site (H) Medium  7/27/2017 Yes      Non-Hospital Problems as of 8/4/2017              Priority Class Noted    HTN (hypertension) Medium  Unknown    Primary pulmonary hypertension (H) Medium  Unknown    Hyperlipidemia LDL goal <130 Medium  2/22/2011    Other rheumatoid arthritis with rheumatoid factor of multiple sites (H) Medium  5/2/2016    Lymphedema of both lower extremities Medium  4/4/2017    Atrial tachycardia (H) Medium  5/16/2017    Cardiogenic shock (H) Medium  5/29/2017    Acute respiratory failure with hypoxia (H) Medium  6/9/2017    Hypertension Medium  Unknown    Critical illness myopathy Medium  6/16/2017      Code Status History     Date Active Date Inactive Code Status Order ID Comments User Context    8/4/2017 11:26 AM  Full Code [id number]  Shyann Marinelli PA-C Outpatient    6/19/2017  9:09 PM 8/4/2017 11:26 AM Full Code [id number]  Ernestina Garcia MD Inpatient    6/19/2017  2:58 PM 6/19/2017  9:09 PM Full Code [id number]  Evie Longo MD Inpatient    6/19/2017  8:57 AM 6/19/2017  2:58 PM Full Code [id number]  Gabriel Zhou MD Outpatient    6/16/2017 11:34 AM 6/19/2017  8:57 AM Full Code [id number]  Flory Agarwal PA Inpatient    6/16/2017  9:16 AM 6/16/2017 11:34 AM Full Code [id number]  Harmony Gilbert, ELIZABETH CNP Outpatient    5/29/2017 10:47 AM 6/16/2017   9:16 AM Full Code [id number]  Rick Nguyễn MD Inpatient    5/19/2017  1:19 PM 5/29/2017 10:47 AM Full Code [id number]  Verna Caba MD Outpatient    5/16/2017  4:16 AM 5/19/2017  1:19 PM Full Code [id number]  Sabas Benites MD ED      Current Code Status     Date Active Code Status Order ID Comments User Context       Prior      Summary of Visit     Reason for your hospital stay       You were here with heart rhythm problems and were also found to have diffuse large B-cell lymphoma. You started chemo while here.                Medication Review      START taking        Dose / Directions Comments    acyclovir 400 MG tablet   Commonly known as:  ZOVIRAX   Indication:  Treatment to Prevent Cytomegalovirus Disease        Dose:  400 mg   Take 1 tablet (400 mg) by mouth 2 times daily   Quantity:  30 tablet   Refills:  0        allopurinol 300 MG tablet   Commonly known as:  ZYLOPRIM        Dose:  300 mg   Take 1 tablet (300 mg) by mouth daily   Quantity:  30 tablet   Refills:  0        filgrastim 15 mcg/mL (in Dextrose) 15 mcg/ml   Commonly known as:  NEUPOGEN        Dose:  5 mcg/kg   Inject 20 mLs (300 mcg) into the vein daily   Refills:  0        fluconazole 200 MG tablet   Commonly known as:  DIFLUCAN   Indication:  lymphoma, starting chemotherapy, neutropenic        Dose:  200 mg   Take 1 tablet (200 mg) by mouth daily   Quantity:  30 tablet   Refills:  0        furosemide 40 MG tablet   Commonly known as:  LASIX   Used for:  Generalized edema        Dose:  40 mg   Take 1 tablet (40 mg) by mouth 2 times daily   Quantity:  30 tablet   Refills:  0        potassium chloride SA 20 MEQ CR tablet   Commonly known as:  K-DUR/KLOR-CON M   Used for:  Deficiency of nutrient element, unspecified        Dose:  40 mEq   Take 2 tablets (40 mEq) by mouth daily   Quantity:  90 tablet   Refills:  0        spironolactone 25 MG tablet   Commonly known as:  ALDACTONE   Used for:  Generalized edema        Dose:   12.5 mg   Take 0.5 tablets (12.5 mg) by mouth daily   Quantity:  30 tablet   Refills:  0        thiamine 50 MG Tabs   Used for:  Deficiency of nutrient element, unspecified        Dose:  50 mg   Take 1 tablet (50 mg) by mouth daily   Quantity:  30 tablet   Refills:  0        traMADol 50 MG tablet   Commonly known as:  ULTRAM   Used for:  Arm pain, left        Dose:  25 mg   Take 0.5 tablets (25 mg) by mouth every 6 hours as needed for moderate pain   Quantity:  28 tablet   Refills:  0          CONTINUE these medications which may have CHANGED, or have new prescriptions. If we are uncertain of the size of tablets/capsules you have at home, strength may be listed as something that might have changed.        Dose / Directions Comments    atenolol 50 MG tablet   Commonly known as:  TENORMIN   This may have changed:  how much to take   Used for:  Paroxysmal atrial fibrillation (H), Atrial tachycardia (H), Other secondary hypertension        Dose:  25 mg   Take 0.5 tablets (25 mg) by mouth daily   Quantity:  30 tablet   Refills:  0        cetirizine 10 MG tablet   Commonly known as:  zyrTEC   This may have changed:    - when to take this  - reasons to take this   Used for:  Rash and nonspecific skin eruption        Dose:  10 mg   Take 1 tablet (10 mg) by mouth daily as needed for allergies   Quantity:  30 tablet   Refills:  0        digoxin 250 MCG tablet   Commonly known as:  LANOXIN   This may have changed:  how much to take   Used for:  Atrial tachycardia (H), Paroxysmal atrial fibrillation (H)        Dose:  125 mcg   Take 0.5 tablets (125 mcg) by mouth daily   Quantity:  30 tablet   Refills:  0        gabapentin 100 MG capsule   Commonly known as:  NEURONTIN   This may have changed:    - when to take this  - additional instructions   Used for:  Mononeuropathy        Dose:  200 mg   Take 2 capsules (200 mg) by mouth 3 times daily   Quantity:  180 capsule   Refills:  3        melatonin 1 MG Tabs tablet   This may have  changed:    - how much to take  - when to take this  - reasons to take this  - Another medication with the same name was removed. Continue taking this medication, and follow the directions you see here.   Used for:  Insomnia, unspecified        Dose:  1-3 mg   Take 1-3 tablets (1-3 mg) by mouth nightly as needed for sleep   Refills:  0        morphine 0.1% in solosite topical gel   This may have changed:    - when to take this  - reasons to take this   Used for:  Arm pain, left        Dose:  2 g   Place 2 g onto the skin 3 times daily as needed   Refills:  0        predniSONE 5 MG tablet   Commonly known as:  DELTASONE   This may have changed:  Another medication with the same name was removed. Continue taking this medication, and follow the directions you see here.   Used for:  Cardiogenic shock (H), Rheumatoid arthritis involving multiple sites with positive rheumatoid factor (H)        Dose:  5 mg   Take 1 tablet (5 mg) by mouth daily   Refills:  0          CONTINUE these medications which have NOT CHANGED        Dose / Directions Comments    acetaminophen 325 MG tablet   Commonly known as:  TYLENOL   Used for:  Rheumatoid arthritis involving multiple sites with positive rheumatoid factor (H)        Dose:  650 mg   Take 2 tablets (650 mg) by mouth every 4 hours as needed for mild pain   Quantity:  100 tablet   Refills:  0        ascorbic acid 500 MG Tabs   Used for:  Deficiency of nutrient element, unspecified        Dose:  500 mg   Take 1 tablet (500 mg) by mouth daily   Quantity:  30 tablet   Refills:  0        beta carotene 93287 UNIT capsule   Used for:  Deficiency of nutrient element, unspecified        Dose:  33410 Units   Take 1 capsule (25,000 Units) by mouth daily   Refills:  0        Calcium Carb-Cholecalciferol 600-800 MG-UNIT Tabs   Used for:  Hypocalcemia        Dose:  2 tablet   Take 2 tablets by mouth every morning   Refills:  0        folic acid 1 MG tablet   Commonly known as:  FOLVITE   Used  for:  Folic acid deficiency        Dose:  1 mg   Take 1 tablet (1 mg) by mouth daily   Quantity:  30 tablet   Refills:  0        * insulin aspart 100 UNIT/ML injection   Commonly known as:  NovoLOG PEN   Used for:  Hyperglycemia        Dose:  1-5 Units   Inject 1-5 Units Subcutaneous At Bedtime   Refills:  0        * insulin aspart 100 UNIT/ML injection   Commonly known as:  NovoLOG PEN   Used for:  Hyperglycemia        Dose:  1-7 Units   Inject 1-7 Units Subcutaneous 3 times daily (before meals)   Refills:  0        multivitamin, therapeutic with minerals Tabs tablet   Used for:  Deficiency of nutrient element, unspecified        Dose:  1 tablet   Take 1 tablet by mouth daily   Quantity:  30 each   Refills:  0        senna-docusate 8.6-50 MG per tablet   Commonly known as:  SENOKOT-S;PERICOLACE   Used for:  Constipation, unspecified constipation type        Dose:  1-2 tablet   Take 1-2 tablets by mouth 2 times daily   Quantity:  100 tablet   Refills:  0        zinc sulfate 220 (50 ZN) MG capsule   Commonly known as:  ZINCATE   Used for:  Deficiency of nutrient element, unspecified        Dose:  220 mg   Take 1 capsule (220 mg) by mouth daily   Refills:  0        * Notice:  This list has 2 medication(s) that are the same as other medications prescribed for you. Read the directions carefully, and ask your doctor or other care provider to review them with you.      STOP taking     apixaban ANTICOAGULANT 2.5 MG tablet   Commonly known as:  ELIQUIS           COQ-10 PO           ertapenem 1 GM vial   Commonly known as:  INVanz           triamcinolone 0.1 % ointment   Commonly known as:  KENALOG           vancomycin 1,500 mg                   After Care     Activity - Up ad emelia           Advance Diet as Tolerated       Follow this diet upon discharge: regular       Daily weights       Call Provider for weight gain of more than 2 pounds per day or 5 pounds per week.       Huynh catheter       To straight gravity drainage.  Change catheter every 2 weeks and PRN for leaking or decreased uring output with signs of bladder distention. DO NOT change catheter without a specific MD order IF diagnosis of benign prostatic hypertrophy (BPH), neurogenic bladder, or other urological conditions       General info for SNF       Length of Stay Estimate: Short Term Care: Estimated # of Days <30  Condition at Discharge: Improving  Level of care:skilled   Rehabilitation Potential: Good  Admission H&P remains valid and up-to-date: Yes  Recent Chemotherapy: Date: 7/28-8/1                       Use Nursing Home Standing Orders: Yes       Intake and output       Every shift       Mantoux instructions       Give two-step Mantoux (PPD) Per Facility Policy Yes       Wound care (specify)       Site:   LUE  Instructions:  Plan of care for wound located on left arm every other day:     Clean wound and skin around wound with Microklenz.     -Apply Cavilon no-sting to periwound skin.    -Apply Medihoney nickel thick to the areas with slough.      Cover with 2 Mepilex border dressings.             Referrals     Medication Therapy Management Referral       Reason for referral:  on more than 5 medications and managing chronic disease    This service is designed to help you get the most from your medications.  A specially trained pharmacist will work closely with you and your doctors  to solve any problems related to your medications and to help you get the   best results from taking them.      The Medication Therapy Management staff will call you to schedule an appointment.       Occupational Therapy Adult Consult       Evaluate and treat as clinically indicated.    Reason:  Deconditioning, weakness       Physical Therapy Adult Consult       Evaluate and treat as clinically indicated.    Reason:  Deconditioning, weakness             Follow-Up Appointment Instructions     Follow Up and recommended labs and tests       Follow-up appointments listed below.              Your next 10 appointments already scheduled     Aug 16, 2017 11:00 AM CDT   [facility] Draw with  [facility] DRAW   [facility] Draw ([facility])    [address] [place] [address]   [phone]            Aug 16, 2017 11:30 AM CDT   Infusion 360 with UC ONCOLOGY INFUSION, UC 31 ATC   [facility] ([facility])    [address] [place] [address]   [phone]            Aug 16, 2017 12:15 PM CDT   RETURN ONC with Lenore Rodriguez MD   [facility] ([facility])    [address] [place] [address]   [phone]            Sep 15, 2017 10:00 AM CDT   (Arrive by 9:45 AM)   New Patient Visit with Pj Cunha MD   [facility] Rheumatology ([facility])    [address] [place] [address]   [phone]            Sep 27, 2017  1:00 PM CDT   (Arrive by 12:45 PM)   Implanted Defibulator with  Cv Device 1   [facility] ([facility])    [address] [place] [id number] [phone]            Sep 27, 2017  1:30 PM CDT   (Arrive by 1:15 PM)   RETURN ARRHYTHMIA with Juan Eaton MD   [facility] ([facility])    [address] [place] [id number] [phone]              Statement of Approval     Ordered          08/04/17 1128  I have reviewed and agree with all the recommendations and orders detailed in this document.  EFFECTIVE NOW     Approved and electronically signed by:  Shyann Marinelli PA-C                                                 INTERAGENCY TRANSFER FORM - NURSING   6/19/2017                       UNIT 5C BMT [facility]: [phone]            Attending Provider: Lenore Rodriguez MD     Allergies:  Metoprolol, No  "Clinical Screening - See Comments, Penicillins, Tape [Adhesive Tape]    Infection:  ESBL   Service:  ONCOLOGY    Ht:  1.455 m (4' 9.28\")   Wt:  66 kg (145 lb 9.6 oz)   Admission Wt:  76.2 kg (168 lb 1.6 oz)    BMI:  31.2 kg/m 2   BSA:  1.63 m 2            Advance Directives        Does patient have a scanned Advance Directive/ACP document in EPIC?           Yes        Immunizations     Name Date      Pneumococcal 23 valent 08/31/11     TDAP Vaccine (Adacel) 02/08/11       ASSESSMENT     Discharge Profile Flowsheet     EXPECTED DISCHARGE     SKIN      Expected Discharge Date  08/04/17 08/02/17 1507   Inspection of bony prominences  Full 08/03/17 2041    DISCHARGE NEEDS ASSESSMENT     Full except areas not inspected   Hip, left;Hip, right;Buttock, left;Buttock, right;Sacrum;Coccyx 07/27/17 1104    Equipment Currently Used at Home  none 06/20/17 1556   Skin WDL  ex 08/03/17 2041    Transportation Available  car;family or friend will provide 06/20/17 1556   Skin Color/Characteristics  charu 08/03/17 2041    GASTROINTESTINAL (ADULT,PEDIATRIC,OB)     Skin Temperature  warm 08/03/17 2041    GI WDL  ex 08/03/17 2026   Skin Moisture  flaky 08/03/17 2041    Abdominal Appearance  obese 08/03/17 2026   Skin Elasticity  slow return to original state 07/30/17 1931    Last Bowel Movement  08/02/17 08/03/17 2026   Skin Integrity  bruise(s);wound(s) 08/03/17 2041    GI Signs/Symptoms  fecal incontinence 08/03/17 2027   SAFETY      Passing flatus  yes 07/29/17 1834   Safety WDL  WDL 08/03/17 2041    COMMUNICATION ASSESSMENT     Safety Factors  bed in low position;wheels locked;call light in reach;upper side rails raised x 2 08/03/17 2041    Patient's communication style  spoken language (English or Bilingual) 06/19/17 1526   Safety Equipment  oxygen flowmeter 07/01/17 2239    Patient's primary language  English 07/14/17 1521                      Assessment WDL (Within Defined Limits) Definitions           Safety WDL     Effective: " "09/28/15    Row Information: <b>WDL Definition:</b> Bed in low position, wheels locked; call light in reach; upper side rails up x 2; ID band on<br> <font color=\"gray\"><i>Item=AS safety wdl>>List=AS safety wdl>>Version=F14</i></font>      Skin WDL     Effective: 09/28/15    Row Information: <b>WDL Definition:</b> Warm; dry; intact; elastic; without discoloration; pressure points without redness<br> <font color=\"gray\"><i>Item=AS skin wdl>>List=AS skin wdl>>Version=F14</i></font>      Vitals     Vital Signs Flowsheet     QUICK ADDS     Side Effects Monitoring: Respiratory Depth  N 07/27/17 0601    Quick Adds  Comments 06/29/17 1512   Side Effects Monitoring: Sedation Level  2 07/27/17 0601    COMMENTS     HEIGHT AND WEIGHT      Comments  PT rest 07/26/17 1456   Height  1.455 m (4' 9.28\") 07/27/17 1152    VITAL SIGNS     Weight  66 kg (145 lb 9.6 oz) 08/04/17 0953    Temp  97.6  F (36.4  C) 08/04/17 0819   Weight Method  Standing scale 07/30/17 0756    Temp src  Axillary 08/04/17 0819   BSA (Calculated - sq m)  1.65 07/27/17 1152    Resp  16 08/04/17 0819   BMI (Calculated)  31.73 07/27/17 1152    Pulse  54 08/03/17 0838   POSITIONING      Heart Rate  54 08/04/17 0819   Body Position  independently positioning 08/03/17 2045    Pulse/Heart Rate Source  Monitor 08/03/17 1243   Head of Bed (HOB)  HOB at 20-30 degrees 08/03/17 2045    BP  158/75 08/04/17 0819   Chair  Recline and up in chair 08/02/17 1538    BP Location  Left leg 08/04/17 0819   Positioning/Transfer Devices  pillows;in use 08/02/17 1538    OXYGEN THERAPY     DAILY CARE      SpO2  95 % 08/04/17 0819   Activity Type  activity adjusted per tolerance 08/03/17 2045    O2 Device  None (Room air) 08/04/17 0819   Activity Level of Assistance  assistance, 1 person 08/03/17 1945    FiO2 (%)  2 % 07/03/17 0754   Activity Assistive Device  gait belt 08/02/17 1538    Oxygen Delivery  2 LPM 07/30/17 1306   Symptoms Noted During/After Activity  shortness of breath " 08/01/17 0916    PAIN/COMFORT     ECG      Patient Currently in Pain  denies 08/04/17 0205   ECG Rhythm  Atrial fibrillation 08/02/17 2216    Preferred Pain Scale  CAPA (Clinically Aligned Pain Assessment) ([facility] Adults Only) 08/03/17 1224   Ectopy  None 08/02/17 2216    0-10 Pain Scale  5 07/21/17 1101   Ectopy Frequency  Occasional 08/02/17 2216    Pain Location  Arm 07/25/17 0923   Lead Monitored  Lead II 08/02/17 2216    Pain Orientation  Left 07/25/17 0923   EKG MONITORING      Pain Descriptors  Constant 07/24/17 0953   Cardiac Regularity  Regular 07/27/17 0143    Pain Intervention(s)  Medication (See eMAR) 07/25/17 1011   KELLY COMA SCALE      Response to Interventions  -- 07/30/17 0105   Best Eye Response  4-->(E4) spontaneous 07/10/17 1846    CLINICALLY ALIGNED PAIN ASSESSMENT (CAPA) ([facility] ADULTS ONLY)     Best Motor Response  6-->(M6) obeys commands 07/10/17 1846    Comfort  comfortably manageable 07/26/17 2258   Best Verbal Response  5-->(V5) oriented 07/10/17 1846    Change in Pain  about the same 07/26/17 2258   Ronco Coma Scale Score  15 07/10/17 1846    Pain Control  fully effective 07/26/17 2258   POINT OF CARE TESTING      Functioning  can do everything I need to 07/26/17 2258   Puncture Site  fingertip 07/30/17 1307    Sleep  normal sleep 07/26/17 2258   Bedside Glucose (mg/dl )   228 mg/dl 07/30/17 1307    PAIN ASSESSMENT/NONVERBAL ICU (ADULT)     CHEMO VALIDATION      Presence of Pain  No nonverbal indicators of pain present 07/27/17 0143   Chemo order double check RN 1  Kalpana Pascual RN 07/27/17 1829    ANALGESIA SIDE EFFECTS MONITORING     Chemo order double check RN 2  Denys Magana Rn 07/27/17 1830    Side Effects Monitoring: Respiratory Quality  R 07/27/17 0601                 Patient Lines/Drains/Airways Status    Active LINES/DRAINS/AIRWAYS     Name: Placement date: Placement time: Site: Days: Last dressing change:    Urethral Catheter  Non-latex 16 fr 07/28/17   1950   Non-latex   6     Peripheral IV 07/28/17 Right Lower forearm 07/28/17   1949   Lower forearm   6     Wound 05/16/17 Right Heel Abrasion(s) From ankle support 05/16/17   1430   Heel   79     Wound 05/29/17 Left;Upper Arm Extravasation red, excoriation, blisters, swollen 05/29/17   1900   Arm   66     Wound Anterior Sternum       Sternum   [address]     Wound 07/21/17 Left;Quadrant;Upper Abdomen Bx site 07/21/17   1700   Abdomen   13     Rash 06/13/17 2100 Bilateral other (see comments) 06/13/17   2100    51     Incision/Surgical Site 07/20/17 Left;Lower Back 07/20/17   2244    14             Patient Lines/Drains/Airways Status    Active PICC/CVC     Name: Placement date: Placement time: Site: Days: Additional Info Last dressing change:    PICC Double Lumen 07/27/17 Right Brachial vein medial 07/27/17   1709   Brachial vein medial   7 External Cath Length (cm): 3 cm   08/01/17 2245 (60.79 hrs)         Size (Fr): 5 Fr            Orientation: Right            Extremity Circumference (cm): 32 cm            Catheter Brand: Hydrelis PowerPicc Dl            Dressing Intervention: Chlorhexidine patch;Transparent;Securing device;Dressing reinforced            Description: Non - valved (open ended);Power PICC            Total Catheter Length (cm) Trimmed: 33 cm            Site Prep: Chlorhexidine            Inserted by: Angelina Bruner RN            Insertion attempts with ultrasound: 1            Patient Tolerance: Tolerated well;Age-appropriate response            Difficulty with threading line: No            Tip location: SVC            Full barrier precautions done: Yes            Consent Signed: Yes            Time Out performed: Yes            Lot #: [id number]            Use for : Chemotherapy               Intake/Output Detail Report     Date Intake                     Output       Net    Shift P.O. I.V. Blood NG/GT IV Piggyback Colloid Enteral Platelets Plasma Cryoprecipitate Blood Components  Total Urine Emesis/NG output Other Stool Total       Day 08/03/17 0000 - 08/03/17 0659 -- 140 -- -- -- -- -- -- -- -- -- 140 -- -- -- -- -- 140    Cony 08/03/17 0700 - 08/03/17 1459 120 -- -- -- -- -- -- -- -- -- -- 120 1300 -- -- -- 1300 -1180    Noc 08/03/17 1500 - 08/03/17 2359 240 135 -- -- -- -- -- -- -- -- -- 375 500 -- -- -- 500 -125    Day 08/04/17 0000 - 08/04/17 0659 240 170 -- -- -- -- -- -- -- -- -- 410 1850 -- -- -- 1850 -1440    Cony 08/04/17 0700 - 08/04/17 1459 -- -- -- -- -- -- -- -- -- -- -- -- -- -- -- -- -- 0      Last Void/BM       Most Recent Value    Urine Occurrence 1 at 07/28/2017 1800    Stool Occurrence 1 at 08/02/2017 0700      Case Management/Discharge Planning     Case Management/Discharge Planning Flowsheet     LIVING ENVIRONMENT     [facility]      Lives With  spouse 06/20/17 1556   Filed Complexity Screen Score  8 06/19/17 1533    Living Arrangements  [place] 06/20/17 1556   ABUSE RISK SCREEN      COPING/STRESS     QUESTION TO PATIENT:  Has a member of your family or a partner(now or in the past) intimidated, hurt, manipulated, or controlled you in any way?  no 06/19/17 1526    Major Change/Loss/Stressor  hospitalization 06/19/17 1526   QUESTION TO PATIENT: Do you feel safe going back to the place where you are living?  yes 06/19/17 1526    EXPECTED DISCHARGE     OBSERVATION: Is there reason to believe there has been maltreatment of a vulnerable adult (ie. Physical/Sexual/Emotional abuse, self neglect, lack of adequate food, shelter, medical care, or financial exploitation)?  no 06/19/17 1526    Expected Discharge Date  08/04/17 08/02/17 1507   (R) MENTAL HEALTH SUICIDE RISK      DISCHARGE PLANNING     Are you depressed or being treated for depression?  No 06/19/17 1526    Transportation Available  car;family or friend will provide 06/20/17 1550   HOMICIDE RISK      FINAL RESOURCES     Homicidal Ideation  no 06/19/17 1526    Equipment Currently Used at Home  none 06/20/17 1553                        UNIT 5C BMT [facility] BANK: [phone]            Medication Administration Report for Amelia Michel as of 08/04/17 1132   Legend:    Given Hold Not Given Due Canceled Entry Other Actions    Time Time (Time) Time  Time-Action       Inactive    Active    Linked        Medications 07/29/17 07/30/17 07/31/17 08/01/17 08/02/17 08/03/17 08/04/17    0.9% sodium chloride infusion  Rate: 200 mL/hr Freq: CONTINUOUS PRN Route: IV  PRN Comment: refractory hypotension associated with hypersensitivity  Start: 07/27/17 1803              acetaminophen (TYLENOL) tablet 650 mg  Dose: 650 mg Freq: EVERY 4 HOURS PRN Route: PO  PRN Reasons: mild pain,headaches  Start: 07/16/17 0603   Admin Instructions: Maximum acetaminophen dose from all sources = 75 mg/kg/day not to exceed 4 grams/day.               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  Start: 07/27/17 0800    0816 (400 mg)-Given       2028 (400 mg)-Given        0903 (400 mg)-Given       2018 (400 mg)-Given        0823 (400 mg)-Given       1940 (400 mg)-Given        0813 (400 mg)-Given       1945 (400 mg)-Given        0859 (400 mg)-Given       2137 (400 mg)-Given        0931 (400 mg)-Given       2011 (400 mg)-Given        0816 (400 mg)-Given       [ ] 2000           albuterol (PROAIR HFA/PROVENTIL HFA/VENTOLIN HFA) Inhaler 1-2 puff  Dose: 1-2 puff Freq: ONCE PRN Route: IN  PRN Comment: refractory bronchospasm associated with hypersensitivity  Start: 07/27/17 1803              albuterol neb solution 2.5 mg  Dose: 2.5 mg Freq: ONCE PRN Route: NEBULIZATION  PRN Comment: refractory bronchospasm associated with hypersensitivity  Start: 07/27/17 1803              allopurinol (ZYLOPRIM) tablet 300 mg  Dose: 300 mg Freq: DAILY Route: PO  Start: 07/27/17 0800    0816 (300 mg)-Given        0903 (300 mg)-Given        0823 (300 mg)-Given        0814 (300 mg)-Given        0859  (300 mg)-Given        0932 (300 mg)-Given        0816 (300 mg)-Given           atenolol (TENORMIN) tablet 25 mg  Dose: 25 mg Freq: DAILY Route: PO  Start: 07/30/17 0800     0955 (25 mg)-Given        0824 (25 mg)-Given        0813 (25 mg)-Given        0858 (25 mg)-Given        0932 (25 mg)-Given        0815 (25 mg)-Given           calcium-vitamin D (CALTRATE) 600-400 MG-UNIT per tablet 2 tablet  Dose: 2 tablet Freq: DAILY Route: PO  Start: 06/20/17 0800   Admin Instructions: Autosub for 600-800 mg-units.     0816 (2 tablet)-Given        0904 (2 tablet)-Given        0840 (2 tablet)-Given        0846 (2 tablet)-Given        0904 (2 tablet)-Given        0931 (2 tablet)-Given        0815 (2 tablet)-Given           dextrose 10 % 1,000 mL infusion  Freq: CONTINUOUS PRN Route: IV  PRN Comment: Hypoglycemia prevention  Start: 06/20/17 0943   Admin Instructions: For Hypoglycemia Prevention for patients on long-acting subcutaneous basal insulin (Glargine, Detemir, NPH) or continuous insulin infusion. Whenever nutrition support is held or interrupted:   1) Infuse IV D10W at nutrition support rate  2) Notify provider for further instructions               digoxin (LANOXIN) tablet 125 mcg  Dose: 125 mcg Freq: DAILY Route: PO  Start: 06/26/17 0800    0939 (125 mcg)-Given [C]        0903 (125 mcg)-Given        0824 (125 mcg)-Given        0814 (125 mcg)-Given        0859 (125 mcg)-Given        0931 (125 mcg)-Given        0815 (125 mcg)-Given           diphenhydrAMINE (BENADRYL) capsule 25 mg  Dose: 25 mg Freq: EVERY 6 HOURS PRN Route: PO  PRN Reasons: itching,other  PRN Comment: adverse drug reaction  Start: 07/31/17 1535              EPINEPHrine (ADRENALIN) injection 0.3 mg  Dose: 0.3 mg Freq: EVERY 5 MIN PRN Route: IM  PRN Comment: swollen lips / tongue, airway obstruction, or hypotension associated with hypersensitivity  Start: 07/27/17 1803   Admin Instructions: May repeat x1 dose. Administer in the mid outer thigh.  Not for  direct undiluted intravenous injection (1mg/ml = 1:1000). Protect from light.               filgrastim 15 mcg/mL (in Dextrose) (NEUPOGEN) infusion 350 mcg  Dose: 5 mcg/kg Freq: DAILY AT 8PM Route: IV  Start: 08/02/17 2000   Admin Instructions: Starting Day 6. If patient will receive as an inpatient.  Compatible with D5W only.         2036 (350 mcg)-Given        2011 (350 mcg)-Given        [ ] 2000           fluconazole (DIFLUCAN) tablet 200 mg  Dose: 200 mg Freq: DAILY Route: PO  Indications Comment: lymphoma, starting chemotherapy, neutropenic  Start: 07/27/17 0800    0816 (200 mg)-Given        0903 (200 mg)-Given        0824 (200 mg)-Given        0813 (200 mg)-Given        0859 (200 mg)-Given        0934 (200 mg)-Given        0816 (200 mg)-Given           folic acid (FOLVITE) tablet 1 mg  Dose: 1 mg Freq: DAILY Route: PO  Start: 07/15/17 1345    0816 (1 mg)-Given        0904 (1 mg)-Given        0822 (1 mg)-Given        0814 (1 mg)-Given        0858 (1 mg)-Given        0931 (1 mg)-Given        0816 (1 mg)-Given           furosemide (LASIX) tablet 40 mg  Dose: 40 mg Freq: 2 TIMES DAILY Route: PO  Start: 08/03/17 2000 2011 (40 mg)-Given        0816 (40 mg)-Given       [ ] 2000           gabapentin (NEURONTIN) capsule 200 mg  Dose: 200 mg Freq: 3 TIMES DAILY Route: PO  Start: 06/19/17 1500    0814 (200 mg)-Given       1432 (200 mg)-Given       2027 (200 mg)-Given        0904 (200 mg)-Given       1438 (200 mg)-Given       2018 (200 mg)-Given        0823 (200 mg)-Given       1400 (200 mg)-Given       2055 (200 mg)-Given        0813 (200 mg)-Given       1452 (200 mg)-Given       2101 (200 mg)-Given        0900 (200 mg)-Given       1345 (200 mg)-Given       2137 (200 mg)-Given        0931 (200 mg)-Given       1334 (200 mg)-Given       2011 (200 mg)-Given        0816 (200 mg)-Given       [ ] 1400       [ ] 2100           glucose 40 % gel 15-30 g  Dose: 15-30 g Freq: EVERY 15 MIN PRN Route: PO  PRN Reason: low blood  sugar  Start: 07/29/17 0723   Admin Instructions: Give 15 g for BG 51 to 69 mg/dL IF patient is conscious and able to swallow. Give 30 g for BG less than or equal to 50 mg/dL IF patient is conscious and able to swallow. Do NOT give glucose gel via enteral tube.  IF patient has enteral tube: give apple juice 120 mL (4 oz or 15 g of CHO) via enteral tube for BG 51 to 69 mg/dL.  Give apple juice 240 mL (8 oz or 30 g of CHO) via enteral tube for BG less than or equal to 50 mg/dL.    ~Oral gel is preferable for conscious and able to swallow patient.   ~IF gel unavailable or patient refuses may provide apple juice 120 mL (4 oz or 15 g of CHO). Document juice on I and O flowsheet.              Or  dextrose 50 % injection 25-50 mL  Dose: 25-50 mL Freq: EVERY 15 MIN PRN Route: IV  PRN Reason: low blood sugar  Start: 07/29/17 0723   Admin Instructions: Use if have IV access, BG less than 70 mg/dL and meet dose criteria below:  Dose if conscious and alert (or disorientated) and NPO = 25 mL  Dose if unconscious / not alert = 50 mL  Vesicant.              Or  glucagon injection 1 mg  Dose: 1 mg Freq: EVERY 15 MIN PRN Route: SC  PRN Reason: low blood sugar  PRN Comment: May repeat x 1 only  Start: 07/29/17 0723   Admin Instructions: May give SQ or IM. ONLY use glucagon IF patient has NO IV access AND is UNABLE to swallow AND blood glucose is LESS than or EQUAL to 50 mg/dL.               heparin lock flush 10 UNIT/ML injection 5-10 mL  Dose: 5-10 mL Freq: EVERY 1 HOUR PRN Route: IK  PRN Reason: other  PRN Comment: to lock each dormant lumen of peripheral midline IV catheter lumen. MAX: 5 mL per lumen.  Start: 06/21/17 1427   Admin Instructions: MAX: 5 mL per lumen.               HOLD MEDICATION  Freq: HOLD Route: XX  Start: 08/02/17 1329              hydrALAZINE (APRESOLINE) injection 10 mg  Dose: 10 mg Freq: EVERY 6 HOURS PRN Route: IV  PRN Reason: high blood pressure  Start: 08/02/17 0931   Admin Instructions: Please give for  SBP >160, DBP >90         0956 (10 mg)-Given             insulin aspart (NovoLOG) inj (RAPID ACTING)  Dose: 1-7 Units Freq: AT BEDTIME Route: SC  Start: 07/30/17 2200   Admin Instructions: HIGH INSULIN RESISTANCE DOSING    Do Not give Bedtime Correction Insulin if BG less than 200.   For  - 224 give 1 units.   For  - 249 give 2 units.   For  - 274 give 3 units.   For  - 299 give 4 units.   For  - 324 give 5 units.   For  - 349 give 6 units.   For BG greater than or equal to 350 give 7 units.   Notify provider if glucose greater than or equal to 350 mg/dL after administration of correction dose.  If given at mealtime, must be administered 5 min before meal or immediately after.      (2200)-Not Given        2238 (5 Units)-Given         0004 (2 Units)-Given       2151 (4 Units)-Given [C]        2111 (7 Units)-Given [C]        [ ] 2200           insulin aspart (NovoLOG) inj (RAPID ACTING)  Dose: 1-10 Units Freq: 3 TIMES DAILY BEFORE MEALS Route: SC  Start: 07/30/17 0730   Admin Instructions: Correction Scale - HIGH INSULIN RESISTANCE DOSING     Do Not give Correction Insulin if Pre-Meal BG less than 140.   For Pre-Meal  - 164 give 1 unit.   For Pre-Meal  - 189 give 2 units.   For Pre-Meal  - 214 give 3 units.   For Pre-Meal  - 239 give 4 units.   For Pre-Meal  - 264 give 5 units.   For Pre-Meal  - 289 give 6 units.   For Pre-Meal  - 314 give 7 units.   For Pre-Meal  - 339 give 8 units.   For Pre-Meal  - 364 give 9 units.   For Pre-Meal BG greater than or equal to 365 give 10 units  To be given with prandial insulin, and based on pre-meal blood glucose.   Notify provider if glucose greater than or equal to 350 mg/dL after administration of correction dose.  If given at mealtime, must be administered 5 min before meal or immediately after.      0905 (4 Units)-Given [C]       1318 (4 Units)-Given       1755 (4 Units)-Given [C]         0841 (4 Units)-Given       1359 (5 Units)-Given       1733 (6 Units)-Given [C]        0839 (3 Units)-Given [C]       1301 (5 Units)-Given [C]       1819 (3 Units)-Given        0857 (1 Units)-Given       1246 (5 Units)-Given       1756 (7 Units)-Given [C]        0928 (2 Units)-Given       1333 (5 Units)-Given       1837 (6 Units)-Given        0828 (2 Units)-Given [C]       [ ] 1200       [ ] 1700           LORazepam (ATIVAN) injection 0.5-1 mg  Dose: 0.5-1 mg Freq: EVERY 6 HOURS PRN Route: IV  PRN Comment: Breakthrough Nausea / Vomiting  Start: 07/27/17 1803   Admin Instructions: Offer second (after Prochlorperazine).  For IV PUSH: Dilute with equal volume of NS.               LORazepam (ATIVAN) tablet 0.5-1 mg  Dose: 0.5-1 mg Freq: EVERY 6 HOURS PRN Route: PO  PRN Comment: Breakthrough Nausea / Vomiting  Start: 07/27/17 1803   Admin Instructions: Offer second (after Prochlorperazine).               magnesium sulfate 2 g in NS intermittent infusion (PharMEDium or FV Cmpd)  Dose: 2 g Freq: DAILY PRN Route: IV  PRN Reason: magnesium supplementation  Start: 08/02/17 0858   Admin Instructions: For Serum Mg++ 1.6 - 2 mg/dL  Give 2 g and recheck magnesium level next AM.               magnesium sulfate 4 g in 100 mL sterile water (premade)  Dose: 4 g Freq: EVERY 4 HOURS PRN Route: IV  PRN Reason: magnesium supplementation  Start: 08/02/17 0858   Admin Instructions: For serum Mg++ less than 1.6 mg/dL  Give 4 g and recheck magnesium level 2 hours after dose, and next AM.               MEDICATION INSTRUCTION  Freq: CONTINUOUS PRN Route: XX  PRN Comment: hypersensitivity reaction  Start: 07/27/17 1803   Admin Instructions: Hypersensitivity Reaction: Slow rate or stop medication infusion if hypersensitivity reaction occurs. Obtain Hypersensitivity Kit located on patient care unit. Administer only the hypersensitivity medications required for appropriate symptom relief.               medication instruction  Freq: CONTINUOUS PRN  Route: XX  Start: 06/19/17 1458   Admin Instructions: No IV fluids               melatonin tablet 1-3 mg  Dose: 1-3 mg Freq: AT BEDTIME Route: PO  Start: 07/28/17 2200    (2314)-Not Given        (2200)-Not Given        (2055)-Not Given               (2353)-Not Given        (2138)-Not Given        (2103)-Not Given        [ ] 2200           meperidine (DEMEROL) injection 25 mg  Dose: 25 mg Freq: EVERY 30 MIN PRN Route: IV  PRN Comment: rigors associated with hypersensitivity.  Start: 07/27/17 1803              methylPREDNISolone sodium succinate (solu-MEDROL) injection 125 mg  Dose: 125 mg Freq: ONCE PRN Route: IV  PRN Comment: swollen lips / tongue, refractory hypotension, or bronchospasm associated with hypersensitivity  Start: 07/27/17 1803   Admin Instructions: Doses greater than 125 mg need to be in at least 50 mL IVPB               miconazole (MICATIN; MICRO GUARD) 2 % powder  Freq: 2 TIMES DAILY Route: Top  Start: 06/30/17 0830   Admin Instructions: Apply to abdominal folds     0817 ( )-Given       2036 ( )-Given        1004 ( )-Given       2228 ( )-Given        0841 ( )-Given       (1940)-Not Given        0818 ( )-Given       (1945)-Not Given        (0857)-Not Given       (2137)-Not Given        (0939)-Not Given       (2014)-Not Given        (0800)-Not Given       [ ] 2000           midodrine (PROAMATINE) tablet 10 mg  Dose: 10 mg Freq: HOLD Route: PO  PRN Comment: was tid w/ meals  Start: 08/02/17 1345              morphine 0.1% in solosite topical gel  Freq: 4 TIMES DAILY PRN Route: TD  PRN Reason: moderate pain  Start: 06/19/17 1617   Admin Instructions: Apply to edges of LUE wound                     1702 ( )-Given        1219 ( )-Given              morphine 0.1% in solosite topical gel 2 g  Dose: 2 g Freq: DAILY Route: TD  Start: 07/22/17 1300   Admin Instructions: Apply to skin     1435 (2 g)-Given        1712 (2 g)-Given [C]        1703 (2 g)-Given        (1220)-Not Given        (1346)-Not Given                 [ ] 1300           multivitamin, therapeutic with minerals (THERA-VIT-M) tablet 1 tablet  Dose: 1 tablet Freq: DAILY Route: PO  Start: 06/19/17 1500    0814 (1 tablet)-Given        0904 (1 tablet)-Given        0824 (1 tablet)-Given        0812 (1 tablet)-Given        0859 (1 tablet)-Given        0939 (1 tablet)-Given        0816 (1 tablet)-Given           naloxone (NARCAN) injection 0.1-0.4 mg  Dose: 0.1-0.4 mg Freq: EVERY 2 MIN PRN Route: IV  PRN Reason: opioid reversal  Start: 06/19/17 1518   Admin Instructions: For respiratory rate LESS than or EQUAL to 8.  Partial reversal dose:  0.1 mg titrated q 2 minutes for Analgesia Side Effects Monitoring Sedation Level of 3 (frequently drowsy, arousable, drifts to sleep during conversation).Full reversal dose:  0.4 mg bolus for Analgesia Side Effects Monitoring Sedation Level of 4 (somnolent, minimal or no response to stimulation).               ondansetron (ZOFRAN) injection 4 mg  Dose: 4 mg Freq: EVERY 6 HOURS PRN Route: IV  PRN Reasons: nausea,vomiting  PRN Comment: Use first line.  Start: 06/21/17 0723   Admin Instructions: Use first for nausea  Irritant.               ondansetron (ZOFRAN) tablet 4 mg  Dose: 4 mg Freq: EVERY 6 HOURS PRN Route: PO  PRN Reasons: nausea,vomiting  Start: 06/24/17 0457   Admin Instructions: Use as first line therapy for n/v               pantoprazole (PROTONIX) EC tablet 40 mg  Dose: 40 mg Freq: EVERY MORNING Route: PO  Start: 06/27/17 0600   Admin Instructions: DO NOT CRUSH.     0604 (40 mg)-Given        0904 (40 mg)-Given        0840 (40 mg)-Given        0614 (40 mg)-Given        0507 (40 mg)-Given        0930 (40 mg)-Given        0547 (40 mg)-Given           polyethylene glycol (MIRALAX/GLYCOLAX) Packet 17 g  Dose: 17 g Freq: 2 TIMES DAILY PRN Route: PO  PRN Reason: constipation  Start: 07/28/17 1635   Admin Instructions: 1 Packet = 17 grams. Mixed prescribed dose in 8 ounces of water. Follow with 8 oz. of water.                potassium chloride (KLOR-CON) Packet 20-40 mEq  Dose: 20-40 mEq Freq: EVERY 2 HOURS PRN Route: ORAL OR FEED  PRN Reason: potassium supplementation  Start: 08/02/17 0858   Admin Instructions: Use if unable to tolerate tablets.    If Serum K+ 3.4-4.0, dose = 20 mEq x1. Recheck K+ level the next AM.  If Serum K+ 3.0-3.3, dose = 60 mEq po total dose (40 mEq x 1 followed in 2 hours by 20 mEq X1). Recheck K+ level 4 hours after dose and the next AM.  If Serum K+ 2.5-2.9, dose = 80 mEq po total dose (40 mEq Q2H x2). Recheck K+ level 4 hours after dose and the next AM.  If Serum K+ less than 2.5, See IV order.  Dissolve packet contents in 4-8 ounces of cold water or juice.               potassium chloride 10 mEq in 100 mL intermittent infusion  Dose: 10 mEq Freq: EVERY 1 HOUR PRN Route: IV  PRN Reason: potassium supplementation  Start: 08/02/17 0858   Admin Instructions: Infuse via PERIPHERAL LINE or CENTRAL LINE. Use for central line replacement if patient weight less than 65 kg, if patient is on TPN with high potassium content or if unit does not stock 20 mEq bags.  If Serum K+ 3.4-4.0, dose = 10 mEq/hr x2 doses. Recheck K+ level the next AM.  If Serum K+ 3.0-3.3, dose = 10 mEq/hr x4 doses (40 mEq IV total dose). Recheck K+ level 2 hours after dose and the next AM.  If Serum K+ less than 3.0, dose = 10 mEq/hr x6 doses (60 mEq IV total dose). Recheck K+ level 2 hours after dose and the next AM.               potassium chloride 10 mEq in 100 mL intermittent infusion with 10 mg lidocaine  Dose: 10 mEq Freq: EVERY 1 HOUR PRN Route: IV  PRN Reason: potassium supplementation  Start: 08/02/17 0858   Admin Instructions: Infuse via PERIPHERAL LINE. Use potassium with lidocaine for pain with peripheral administration.  If Serum K+ 3.4-4.0, dose = 10 mEq/hr x2 doses. Recheck K+ level the next AM.  If Serum K+ 3.0-3.3, dose = 10 mEq/hr x4 doses (40 mEq IV total dose). Recheck K+ level 2 hours after dose and the next AM.  If Serum K+  less than 3.0, dose = 10 mEq/hr x6 doses (60 mEq IV total dose). Recheck K+ level 2 hours after dose and the next AM.               potassium chloride 20 mEq in 50 mL intermittent infusion  Dose: 20 mEq Freq: EVERY 1 HOUR PRN Route: IV  PRN Reason: potassium supplementation  Last Dose: 20 mEq (08/04/17 0401)  Start: 08/02/17 0858   Admin Instructions: Infuse via CENTRAL LINE Only.  May need EKG if less than 65 kg or on TPN - Max rate is 0.3 mEq/kg/hr for patients not on EKG monitoring.    If Serum K+ 3.4-4.0, dose = 20 mEq/hr x1 doses. Recheck K+ level the next AM.  If Serum K+ 3.0-3.3, dose = 20 mEq/hr x2 doses (40 mEq IV total dose).  Recheck K+ level 2 hours after dose and the next AM.  If Serum K+ less than 3.0, dose = 20 mEq/hr x3 doses (60 mEq IV total dose). Recheck K+ level 2 hours after dose and the next AM.         1039 (20 mEq)-New Bag         0401 (20 mEq)-New Bag           potassium chloride SA (K-DUR/KLOR-CON M) CR tablet 20-40 mEq  Dose: 20-40 mEq Freq: EVERY 2 HOURS PRN Route: PO  PRN Reason: potassium supplementation  Start: 08/02/17 0858   Admin Instructions: Use if able to take PO.   If Serum K+ 3.4-4.0, dose = 20 mEq x1. Recheck K+ level the next AM.  If Serum K+ 3.0-3.3, dose = 60 mEq po total dose (40 mEq x1 followed in 2 hours by 20 mEq x1). Recheck K+ level 4 hours after dose and the next AM.  If Serum K+ 2.5-2.9, dose = 80 mEq po total dose (40 mEq Q2H x2). Recheck K+ level 4 hours after dose and the next AM.  If Serum K+ less than 2.5, See IV order.  DO NOT CRUSH.               predniSONE (DELTASONE) tablet 5 mg  Dose: 5 mg Freq: DAILY Route: PO  Start: 08/04/17 0800          0815 (5 mg)-Given           prochlorperazine (COMPAZINE) injection 10 mg  Dose: 10 mg Freq: EVERY 6 HOURS PRN Route: IV  PRN Comment: Breakthrough Nausea/Vomiting  Start: 07/27/17 2743   Admin Instructions: Offer second .               prochlorperazine (COMPAZINE) tablet 10 mg  Dose: 10 mg Freq: EVERY 6 HOURS PRN  Route: PO  PRN Comment: Breakthrough Nausea/Vomiting  Start: 07/27/17 1803   Admin Instructions: Offer second.               promethazine (PHENERGAN) tablet 25 mg  Dose: 25 mg Freq: EVERY 6 HOURS PRN Route: PO  PRN Reason: nausea  Start: 08/02/17 1500              sennosides (SENOKOT) tablet 1-2 tablet  Dose: 1-2 tablet Freq: 2 TIMES DAILY PRN Route: PO  PRN Reason: constipation  Start: 07/29/17 1230       2242 (1 tablet)-Given         2019 (1 tablet)-Given            simethicone (MYLICON) chewable tablet 80 mg  Dose: 80 mg Freq: EVERY 6 HOURS PRN Route: PO  PRN Reason: cramping  Start: 06/21/17 0751              sodium chloride (PF) 0.9% PF flush 1-74 mL  Dose: 1-74 mL Freq: EVERY 8 HOURS Route: IK  Start: 07/18/17 1115    (0008)-Not Given       (0823)-Not Given       (1737)-Not Given       (2315)-Not Given        (1004)-Not Given       (1548)-Not Given        (0000)-Not Given                      (0001)-Not Given                     (2353)-Not Given        (0903)-Not Given       1607 (10 mL)-Given               (0940)-Not Given       (1623)-Not Given               (0800)-Not Given       [ ] 1600           sodium chloride (PF) 0.9% PF flush 10 mL  Dose: 10 mL Freq: EVERY 7 DAYS Route: IK  Start: 07/27/17 1715   Admin Instructions: And Q1H PRN, to lock each CVC - Valved (Tunneled and Non-Tunneled) dormant lumen.          (1623)-Not Given            sodium chloride (PF) 0.9% PF flush 10 mL  Dose: 10 mL Freq: EVERY 8 HOURS Route: IK  Start: 06/21/17 1430   Admin Instructions: To lock each peripheral midline IV catheter dormant lumen. Recommended to flush Q8H each lumen even during infusions.     (0008)-Not Given       (0826)-Not Given              (2315)-Not Given        (1005)-Not Given       (1549)-Not Given        (0000)-Not Given                      (0001)-Not Given                     (2354)-Not Given        (0903)-Not Given       1607 (10 mL)-Given               (0940)-Not Given       (1624)-Not Given                (0355)-Not Given [C]       [ ] 1600           sodium chloride (PF) 0.9% PF flush 10-20 mL  Dose: 10-20 mL Freq: EVERY 1 HOUR PRN Route: IK  PRN Reasons: line flush,post meds or blood draw  Start: 07/27/17 1706   Admin Instructions: to flush CVC - Valved (Tunneled and Non-Tunneled).   10 mL post IV meds; 20 mL post blood draw.               sodium chloride (PF) 0.9% PF flush 10-20 mL  Dose: 10-20 mL Freq: EVERY 1 HOUR PRN Route: IK  PRN Reasons: line flush,post meds or blood draw  PRN Comment: to flush each peripheral midline IV catheter lumen.  Start: 06/21/17 1427   Admin Instructions: 10 mL post IV meds;   20 mL post blood draw.               sodium chloride (PF) 0.9% PF flush 3 mL  Dose: 3 mL Freq: EVERY 1 HOUR PRN Route: IK  PRN Reason: line flush  PRN Comment: for peripheral IV flush post IV meds  Start: 06/19/17 1458              sodium chloride (PF) 0.9% PF flush 5-50 mL  Dose: 5-50 mL Freq: ONCE PRN Route: IK  PRN Reason: line flush  PRN Comment: to flush each lumen with line placement  Start: 07/27/17 1314   Admin Instructions: May repeat x 1               sodium phosphate 10 mmol in D5W intermittent infusion  Dose: 10 mmol Freq: DAILY PRN Route: IV  PRN Reason: phosphorous supplementation  Start: 06/19/17 2110   Admin Instructions: For serum phosphorus level 2.5-2.7  Do not infuse Phosphorus in the same line as TPN.   Give 10 mmol and recheck phosphorus level next AM.               sodium phosphate 15 mmol in D5W intermittent infusion  Dose: 15 mmol Freq: DAILY PRN Route: IV  PRN Reason: phosphorous supplementation  Last Dose: 15 mmol (07/04/17 0214)  Start: 06/19/17 2110   Admin Instructions: For serum phosphorus level 2.0-2.4  Do not infuse Phosphorus in the same line as TPN.   Give 15 mmol and recheck phosphorus level next AM.     2027 (15 mmol)-New Bag         0844 (15 mmol)-New Bag        0809 (15 mmol)-New Bag         0934 (15 mmol)-New Bag            sodium phosphate 20 mmol in D5W  intermittent infusion  Dose: 20 mmol Freq: EVERY 6 HOURS PRN Route: IV  PRN Reason: phosphorous supplementation  PRN Comment: serum phosphorus level 1.1-1.9  Start: 06/19/17 2110   Admin Instructions: For serum phosphorus level 1.1-1.9  Do not infuse Phosphorus in the same line as TPN.   Give 20 mmol and recheck phosphorus level 2 hours after last dose and next AM.               sodium phosphate 25 mmol in D5W intermittent infusion  Dose: 25 mmol Freq: EVERY 8 HOURS PRN Route: IV  PRN Reason: phosphorous supplementation  Start: 06/19/17 2110   Admin Instructions: For serum phosphorus level less than 1.1  Do not infuse Phosphorus in the same line as TPN.   Give 25 mmol and recheck phosphorus level 2 hours after last dose and next AM.               spironolactone (ALDACTONE) half-tab 12.5 mg  Dose: 12.5 mg Freq: DAILY Route: PO  Start: 08/01/17 1530       1702 (12.5 mg)-Given        0859 (12.5 mg)-Given        0933 (12.5 mg)-Given        0816 (12.5 mg)-Given           thiamine tablet 50 mg  Dose: 50 mg Freq: DAILY Route: PO  Start: 07/15/17 1345    0814 (50 mg)-Given        0903 (50 mg)-Given        0823 (50 mg)-Given        0814 (50 mg)-Given        0900 (50 mg)-Given        0933 (50 mg)-Given        0815 (50 mg)-Given           traMADol (ULTRAM) half-tab 25 mg  Dose: 25 mg Freq: EVERY 6 HOURS PRN Route: PO  PRN Reason: moderate pain  Start: 06/19/17 2206              traZODone (DESYREL) quarter-tab 12.5 mg  Dose: 12.5 mg Freq: AT BEDTIME PRN Route: PO  PRN Reason: sleep  Start: 07/23/17 1212   Admin Instructions: Start with 25mg , may repeat x1 if needed for sleep      2252 (12.5 mg)-Given               Completed Medications  Medications 07/29/17 07/30/17 07/31/17 08/01/17 08/02/17 08/03/17 08/04/17         Dose: 2 mg Freq: ONCE Route: IK  Start: 08/02/17 1645   End: 08/02/17 1714        1714 (2 mg)-Given               Dose: 750 mg/m2 Freq: ONCE Route: IV  Last Dose: 1,240 mg (08/01/17 1802)  Start: 08/01/17 1600    End: 08/01/17 1902   Admin Instructions: Day 5.  Irritant.  Dose or Frequency adjustments may be needed for hepatic or renal impairment.   See Chemo Renal and Chemo Hepatic Dosing reference link for further information.           1802 (1,240 mg)-New Bag [C]                Dose: 8 mg Freq: DAILY Route: PO  Start: 08/02/17 0800   End: 08/03/17 0932   Admin Instructions: Days 6 and 7.         0900 (8 mg)-Given        0932 (8 mg)-Given              Dose: 25 mg/m2 Freq: EVERY 24 HOURS Route: IV  Last Dose: 40 mg (07/31/17 1634)  Start: 07/28/17 1600   End: 08/01/17 1634   Admin Instructions: Days 1 through 4. Continuous infusion. Possible vesicant.  Possible vesicant.  Dose or Frequency adjustments may be needed for hepatic or renal impairment.   See Chemo Renal and Chemo Hepatic Dosing reference link for further information.        1546 (40 mg)-New Bag [C]        1548 (40 mg)-New Bag [C]        1634 (40 mg)-New Bag [C]                 Dose: 150 mg Freq: ONCE Route: IV  Last Dose: 150 mg (08/01/17 1702)  Start: 08/01/17 1530   End: 08/01/17 1722   Admin Instructions: Day 5. Give 30 minutes before chemotherapy.        1702 (150 mg)-New Bag                Dose: 2-5 mL Freq: ONCE PRN Route: IK  PRN Reason: line flush  PRN Comment: for locking each dormant lumen with line placement  Start: 07/27/17 1314   End: 08/04/17 1041   Admin Instructions: May repeat x 1           1041 (5 mL)-Given [C]             Dose: 4 Units Freq: ONCE Route: SC  Start: 08/03/17 2230   End: 08/03/17 2231   Admin Instructions: If given at mealtime, must be administered 5 min before meal or immediately after.          2231 (4 Units)-Given              Dose: 20 mEq Freq: EVERY HOUR Route: IV  Start: 08/03/17 1200   End: 08/03/17 1401   Admin Instructions: Infuse through Central Line over 1 hour.          1246 (20 mEq)-New Bag       1401 (20 mEq)-New Bag              Dose: 30 mg/m2 Freq: 2 TIMES DAILY Route: PO  Start: 07/28/17 0800   End: 08/01/17 1632    Admin Instructions: Days 1 through 5.  Start before chemotherapy to provide antiemetic benefit.  Give with milk or food     0817 (50 mg)-Given       1551 (50 mg)-Given        0904 (50 mg)-Given       1551 (50 mg)-Given        0823 (50 mg)-Given       1658 (50 mg)-Given        0814 (50 mg)-Given       1638 (50 mg)-Given                Dose: 0.4 mg/m2 Freq: EVERY 24 HOURS Route: IV  Last Dose: 0.66 mg (07/29/17 1658)  Start: 07/28/17 1600   End: 08/01/17 1636   Admin Instructions: Days 1 through 4.  Give over 24 hours.  Vesicant.  Dose or Frequency adjustments may be needed for hepatic impairment.   See Chemo Hepatic Dosing reference link for further information.        1658 (0.66 mg)-New Bag [C]        1731 (0.66 mg)-New Bag [C]        1636 (0.66 mg)-New Bag [C]              Discontinued Medications  Medications 07/29/17 07/30/17 07/31/17 08/01/17 08/02/17 08/03/17 08/04/17         Rate: 5 mL/hr Freq: CONTINUOUS Route: IV  Last Dose: 5 mg/hr (08/02/17 1500)  Start: 07/28/17 1700   End: 08/02/17 1456    1515 (5 mg/hr)-New Bag        0955 (5 mg/hr)-New Bag        0519 (5 mg/hr)-New Bag       0800 (5 mg/hr)-Rate/Dose Verify       0900 (5 mg/hr)-Rate/Dose Verify       1000 (5 mg/hr)-Rate/Dose Verify       1100 (5 mg/hr)-Rate/Dose Verify       1200 (5 mg/hr)-Rate/Dose Verify       1300 (5 mg/hr)-Rate/Dose Verify       1500 (5 mg/hr)-Rate/Dose Verify       1600 (5 mg/hr)-Rate/Dose Verify       1700 (5 mg/hr)-Rate/Dose Verify       1820 (5 mg/hr)-Rate/Dose Verify        0206 (5 mg/hr)-New Bag       0800 (5 mg/hr)-Rate/Dose Verify       0900 (5 mg/hr)-Rate/Dose Verify       1000 (5 mg/hr)-Rate/Dose Verify       1100 (5 mg/hr)-Rate/Dose Verify       1200 (5 mg/hr)-Rate/Dose Verify       1300 (5 mg/hr)-Rate/Dose Verify       1400 (5 mg/hr)-Rate/Dose Verify       1500 (5 mg/hr)-Rate/Dose Verify       2240 (5 mg/hr)-New Bag        0858 (5 mg/hr)-Handoff       0859 (5 mg/hr)-Rate/Dose Verify       0900 (5 mg/hr)-Rate/Dose  Verify       1000 (5 mg/hr)-Rate/Dose Verify       1100 (5 mg/hr)-Rate/Dose Verify       1200 (5 mg/hr)-Rate/Dose Verify       1351 (5 mg/hr)-New Bag       1400 (5 mg/hr)-Rate/Dose Verify       1456-Med Discontinued  1500 (5 mg/hr)-Rate/Dose Verify               Dose: 40 mg Freq: 2 TIMES DAILY. Route: IV  Start: 08/02/17 1600   End: 08/03/17 1047        1607 (40 mg)-Given        0933 (40 mg)-Given       1047-Med Discontinued          Dose: 2 g Freq: DAILY PRN Route: IV  PRN Reason: magnesium supplementation  Last Dose: 2 g (07/19/17 0845)  Start: 06/19/17 2110   End: 08/02/17 0900   Admin Instructions: For Serum Mg++ 1.6 - 2 mg/dL  Give 2 g and recheck magnesium level next AM.     1756 (2 g)-New Bag         0518 (2 g)-New Bag         0507 (2 g)-New Bag       0900-Med Discontinued           Dose: 4 g Freq: EVERY 4 HOURS PRN Route: IV  PRN Reason: magnesium supplementation  Last Dose: 4 g (07/26/17 1938)  Start: 06/19/17 2110   End: 08/02/17 0900   Admin Instructions: For serum Mg++ less than 1.6 mg/dL  Give 4 g and recheck magnesium level 2 hours after dose, and next AM.         0900-Med Discontinued           Dose: 10 mg Freq: 3 TIMES DAILY WITH MEALS Route: PO  Start: 07/13/17 1200   End: 08/02/17 1331    0817 (10 mg)-Given       1242 (10 mg)-Given       1755 (10 mg)-Given        0903 (10 mg)-Given       1318 (10 mg)-Given       1730 (10 mg)-Given        0824 (10 mg)-Given       1250 (10 mg)-Given       1852 (10 mg)-Given        0812 (10 mg)-Given       1219 (10 mg)-Given       1819 (10 mg)-Given        0859 (10 mg)-Given       1331-Med Discontinued  (1332)-Not Given [C]               Dose: 10 mEq Freq: EVERY 1 HOUR PRN Route: IV  PRN Reason: potassium supplementation  Start: 08/01/17 0949   End: 08/02/17 0900   Admin Instructions: Infuse via PERIPHERAL LINE or CENTRAL LINE. Use for central line replacement if patient weight less than 65 kg, if patient is on TPN with high potassium content or if unit does not stock  20 mEq bags.  If Serum K+ 3.4-4.0, dose = 10 mEq/hr x2 doses. Recheck K+ level the next AM.  If Serum K+ 3.0-3.3, dose = 10 mEq/hr x4 doses (40 mEq IV total dose). Recheck K+ level 2 hours after dose and the next AM.  If Serum K+ less than 3.0, dose = 10 mEq/hr x6 doses (60 mEq IV total dose). Recheck K+ level 2 hours after dose and the next AM.         0900-Med Discontinued           Dose: 10 mEq Freq: EVERY 1 HOUR PRN Route: IV  PRN Reason: potassium supplementation  Start: 08/01/17 0949   End: 08/02/17 0900   Admin Instructions: Infuse via PERIPHERAL LINE. Use potassium with lidocaine for pain with peripheral administration.  If Serum K+ 3.4-4.0, dose = 10 mEq/hr x2 doses. Recheck K+ level the next AM.  If Serum K+ 3.0-3.3, dose = 10 mEq/hr x4 doses (40 mEq IV total dose). Recheck K+ level 2 hours after dose and the next AM.  If Serum K+ less than 3.0, dose = 10 mEq/hr x6 doses (60 mEq IV total dose). Recheck K+ level 2 hours after dose and the next AM.         0900-Med Discontinued           Dose: 20 mEq Freq: EVERY 4 HOURS Route: IV  Last Dose: 20 mEq (08/02/17 2037)  Start: 08/02/17 1301   End: 08/02/17 2245   Admin Instructions: Infuse through Central Line over 1 hour.         1251 (20 mEq)-New Bag       1608 (20 mEq)-New Bag       2037 (20 mEq)-New Bag       2245-Med Discontinued           Dose: 20 mEq Freq: EVERY 1 HOUR PRN Route: IV  PRN Reason: potassium supplementation  Last Dose: 20 mEq (08/02/17 0643)  Start: 08/01/17 0949   End: 08/02/17 0900   Admin Instructions: Infuse via CENTRAL LINE Only.  May need EKG if less than 65 kg or on TPN - Max rate is 0.3 mEq/kg/hr for patients not on EKG monitoring.    If Serum K+ 3.4-4.0, dose = 20 mEq/hr x1 doses. Recheck K+ level the next AM.  If Serum K+ 3.0-3.3, dose = 20 mEq/hr x2 doses (40 mEq IV total dose).  Recheck K+ level 2 hours after dose and the next AM.  If Serum K+ less than 3.0, dose = 20 mEq/hr x3 doses (60 mEq IV total dose). Recheck K+ level 2  hours after dose and the next AM.        1302 (20 mEq)-New Bag        0506 (20 mEq)-New Bag       0643 (20 mEq)-New Bag       0900-Med Discontinued           Dose: 20 mEq Freq: 3 TIMES DAILY Route: IV  Start: 08/01/17 1400   End: 08/02/17 0947   Admin Instructions: Infuse through Central Line over 1 hour.        1453 (20 mEq)-New Bag       1945 (20 mEq)-New Bag        0901 (20 mEq)-New Bag       0947-Med Discontinued           Dose: 25 mg Freq: EVERY 8 HOURS Route: PO  Start: 07/31/17 1600   End: 08/02/17 1456      (1600)-Not Given       1940 (25 mg)-Given        0343 (25 mg)-Given       1219 (25 mg)-Given       1945 (25 mg)-Given        0408 (25 mg)-Given       1250 (25 mg)-Given       1456-Med Discontinued      Medications 07/29/17 07/30/17 07/31/17 08/01/17 08/02/17 08/03/17 08/04/17               INTERAGENCY TRANSFER FORM - NOTES (H&P, Discharge Summary, Consults, Procedures, Therapies)   6/19/2017                       UNIT 5C BMT [facility] BANK: [phone]               History & Physicals      H&P by Selvin Gandara MD at 6/19/2017  8:06 PM     Author:  Selvin Gandara MD Service:  Cardiology Author Type:  Physician    Filed:  6/20/2017  1:06 PM Date of Service:  6/19/2017  8:06 PM Creation Time:  6/19/2017  8:06 PM    Status:  Signed :  Selvin Gandara MD (Physician)         [facility]  Cardiology I History and Physical    Name: Amelia Michel MRN#: [id number]   Age: 56 year old YOB: 1960       Assessment and Plan   Amelia Michel is a 56 year old[SL1.1] woman with hx of VSD repair (1969), Rheumatoid arthritis, and a recent diagnosis of Afib/Aflutter/SVT, who presented to [facility] on 5/29 after Out of Hospital cardiac arrest with shockable rhythm. After ROSC in the field, she was admitted from 5/29-6/15, extubated 6/4. While at ARU[SL1.2] (6/15-6/19)[SL1.3], pt developed worsening LUE wound pain and fever, and was admitted[SL1.2] back to [facility][SL1.3]  for further workup.     #SIRS (Fever, leukopenia, tachcyardia):  DDX includes LUE wound vs. Atelectasis R>L visualized on 6/19/17 CXR. Low suspicion for PE given lack of hypoxia. No URI symptoms. Has had intermittent loose stools.  UA with few bacteria, 2 WBC, mucous, 4 hyaline casts, 11 granular casts, 1 cellular cast.  Pt did have a history of potential drug eruption rash during last admission (dermatology was consulted, etiology felt to be potentially 2/2 to digoxin); pt does not have evidence of that rash on admission.   --Treating LUE as below   --CDiff + Enteric panel   --Encourage incentive spirometer, PT/OT     #Left Extremity Wound with concern for cellulitis +/- myositis with potential area of myonecrosis:  LUE onset around time of cardiac arrest, unclear etiology (?trauma during code), exacerbated by fluid extravasation from IV during last admission. Over course of rehab, pt has noticed increasing pain in LUE with therapy and spiked fevers to 101F and 102F on 6/18 and 6/19, respectively. On admission, wound appears erythematous, with eschar and fibrinous material overlying wound (no purulent drainage), tender to palpation.[SL1.2]  6/19 L Elbow XR:  Negative for osteo, diffuse subcutaneous soft tissue stranding, possibly representative of cellulitis   6/19 L Elbow MRI: Per prelim discussion with Radiologist--> Negative for osteo, but concerning for cellulitis +/- myositis and area of enhancement concerning for myonecrosis. Final report pending.[SL1.3]   --Continue Vancomycin/Ertapenem  --General Surgery consulted, please speak with them again in AM re: MRI results   --BCx's x 2 pending (from ARU)  --Wound Cx pending   --[SL1.2]Pain control: Tramadol 25mg q6h prn,[SL1.3] Continue morphine gel around edges of wound for pain   --WOC consult placed     # Out of Hospital Cardiac Arrest and Cardiogenic Shock:   - ICD will be deferred at this time in lieu of LUE extravasation/high risk of infection. 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 Plts noted to decrease from 104 to as low as 15 during recent hospitalization requiring transfusion w/ chronically low in the 's as outpatient.  -[SL1.2]-[SL1.3]If this is chronic ITP, no need for treatment unless plts drift < 30s  -[SL1.2]-[SL1.3] Continue to hold RA meds for now  -[SL1.2]-[SL1.3] Continue Apixaban 2.5 mg BID for anticoagulation. This will need to be held 2 days prior to ICD placement.    # Afib: Known Afib w/ RVR from prior hospitalization w/ rates difficult to control despite being on Metoprolol 100 mg XL and Diltiazem. She had 1 DCCV for which she reverted within 24hrs. Sotolol was also attempted but patient did not tolerate the medication due to nausea and vomiting and subsequently failed Metoprolol and Propranolol for the same issue. She was discharged during this previous hospitalization on Amiodarone and was also started on Apixaban. Patient had no evidence of ventriclar arrhythmia prior to discharge. RFA was discuss but was defered as patient had a UTI at the time with ESBL. Given difficulty to control rates, an CMR was performed to r/o inflammation or scar as cause of arrhythmia. CMR did not show atrial inflammation or abnormalities except for enlargement. 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   - Rhythm spontaneously converting from NSR to Afib w/ RVR w/ rates into the 130-140's.   - Continue w/ Digoxin 250 mg daily + Atenolol 50 mg daily.  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 Now on 10 mg daily, taper to 7.5 mg on 6/29, then to 5 mg daily after 2 weeks if continues to have low disease activity  -- Continue to hold HCQ and MTX until outpatient follow-up  -- Follow-up with [facility] Rheumatology clinic Dr. Conor Cunha in 4-6 weeks after discharge    # Severe Critical Illness Myopathy: Significant deconditioning w/ median neuropathies most likely localized to the wrists and ulnar neuropathies most likely localized to the elbows secondary to RA.  -- Ongoing extensive PT/OT/SLP     # ESBL UTI: Completed course abx.    # Malnutrition:   -- Dysphagia Level 3 diet w/ thin liquids. Straws ok. Pt should be upright, alert, small bites/sips consumed and alternate consistencies.  -- Nutrition recommendations are to continue Peptamen 1.5 Tube feeds to 40 ml/hr x 11 hours which will provide 440 ml TF, 660 kcals, and 30 g protein daily. TFs may be adjusted pending oral intake. Once kcal counts reveal that patient is consuming at least 900 kcals and 45 g protein daily on average can discontinue.  --Water flushes as needed.  -- Nutrition consult placed[SL1.3]     ##Other  - Prophylaxis:   -DVT:[SL1.1] Apixiban[SL1.2]   -GI:[SL1.1] None[SL1.3]   -Bowel:[SL1.1]  None while stool studies pending for loose stools[SL1.2]   - FEN:[SL1.1]  Nutrition consulted for Tube Feeds + Chalo Counts[SL1.3], dysphagia[SL1.2] diet[SL1.1] 3 with thin liquids[SL1.2]   - IVF:[SL1.1] None[SL1.2]   - Electrolyte Protocol:[SL1.1]  Yes[SL1.2]  - Telemetry:[SL1.1] Yes; Life Vest needs to be worn at all times[SL1.2]  - Family:[SL1.1]  at bedside, updated with plan of care[SL1.2]   - Code status:[SL1.1] Full code[SL1.2]    Disposition:[SL1.1]  Admit to Cards 1[SL1.2]    Ernestina Garcia  PGY-2  Internal Medicine  [phone]    This patient was staffed with the attending, [SL1.1] Nichol,[SL1.2] who agrees with the above assessment and  "plan.           Chief Complaint     \"They are worried I have sepsis.\"       History of Present Illness      Recent hospitalization 5/29-6/15/17 discharge summary: \"Ms. Michel is a 57 yo F with hx HTN, RA (on MTX, Plaquenil, Prednisone), Afib/Flutter, VSD s/p repair who was admitted on 5/29 after out of hospital cardiac arrest with ROSC and s/p therapeutic hypothermia. She went to cath lab on 5/29 which showed normal coronaries. The etiology of the cardiac arrest remains unclear. This cardiac arrest caused multi-organ failure including bone marrow failure, hypoxic respiratory failure requiring mechanical ventilation, and acute kidney injury. She also suffered some degree of anoxic brain injury, though there is minimal clinical evidence of this, and severe critical illness myopathy. All of these required a stay in the intensive care unit at the [facility] and a lengthy hospitalization afterwards. She has been treated with Meropenem and Ertapenem for ESBL UTI and aspiration PNA during this hospitalization. Shock liver and CELSA have resolved. Hgb has required transfusion but now stable 8-9. WBC mildly low with normal neutrophils. She has been treated with stress dose steroids and now steroid tape w/ PO Prednisone w/ history of RA. Pancytopenia w/ Plts as low as 15, now stabilized in the 30-40's w/ previous intermittent transfusions. Plan for ICD s/p cardiac arrest but unable to proceed w/ implantation during this hospitalization due to LUE extravasation and rash (high infectious risk). Will plan to discharge to rehabilitation facility for severe myopathy/deconditioning and continued aggressive rehabilitaiton w/ LifeVest and close follow-up. Plan for involvement of Allied Health Care Teams including: Physical Therapy (>3 months), Occupational Therapy (>3 months), Dietetics/Nutrition (>3 months), and Social Work (>3 months).\"      Pt states that she first developed a fever yesterday (6/18) up to " "101. She didn't feel too different, just felt  \"crummy.\" She states that her left upper extremity wound first occurred around Memorial Day, around the time of the cardiac arrest, she felt that it got worse when fluid extravasated into the wound from her IV. Currently, the wound is red, swollen, hot to touch, and painful to palpation. While at ARU, pt was noticing worsening pain in LUE while performing therapeutic exercises over the weekend. This morning awoke and was febrile and discharged back to acute hospital for further management and work up.      Procal is 0.62, lactic acid 3.1, blood cultures/wound cultures pending, UA/UC pending, CXR obtained similar to prior[SL1.1] though with R>L atelectasis[SL1.3], Pancytopenic. Started on vanco/ertapenem (has penicillin allergy and decided against zosyn), IV fluid bolus 250 mL started but may not have finished before transfer[SL1.1] to [facility].[SL1.3]     Pt endorses intermittently feeling hot/sweaty and chilled. She states that she does not develop any nausea/vomiting unless her heart is racing too fast. No chest pain or trouble breathing. No abdominal pain. Has had intermittent loose stools.       Review of Systems   All 12 systems reviewed.  Relevant positives/negatives noted in HPI above      Past Medical History   (Reviewed and updated)  Past Medical History:   Diagnosis Date     Hypertension      Rheumatoid arthritis(714.0)           Past Surgical History   (Reviewed and updated)  Past Surgical History:   Procedure Laterality Date     ANESTHESIA CARDIOVERSION N/A 5/17/2017    Procedure: ANESTHESIA CARDIOVERSION;  ANESTHESIA CARDIOVERSION;  Surgeon: GENERIC ANESTHESIA PROVIDER;  Location: [facility] OR     ARTHRODESIS WRIST  2000    Right wrist     FOOT SURGERY      4 left and 2 right     RELEASE CARPAL TUNNEL BILATERAL       REPAIR VENTRICULAR SEPTAL DEFECT  1969         Allergies   (Reviewed and updated)   Allergies   Allergen Reactions     Penicillins      Tape [Adhesive " Tape] Rash         Medications   (Reviewed and updated)    Current Outpatient Prescriptions on File Prior to Encounter:  ertapenem (INVANZ) 1 GM vial Inject 1 g into the vein every 24 hours   insulin aspart (NOVOLOG PEN) 100 UNIT/ML injection Inject 1-7 Units Subcutaneous 3 times daily (before meals)   insulin aspart (NOVOLOG PEN) 100 UNIT/ML injection Inject 1-5 Units Subcutaneous At Bedtime   melatonin 3 MG tablet Take 1 tablet (3 mg) by mouth nightly as needed for sleep   melatonin 1 MG TABS tablet Take 1 tablet (1 mg) by mouth At Bedtime   morphine 0.1% in solosite topical gel Place 2 g onto the skin 3 times daily   multivitamin, therapeutic with minerals (THERA-VIT-M) TABS tablet Take 1 tablet by mouth daily   [START ON 7/15/2017] predniSONE (DELTASONE) 5 MG tablet Take 1 tablet (5 mg) by mouth daily   predniSONE (DELTASONE) 10 MG tablet Take 1 tablet (10 mg) by mouth daily   [START ON 7/1/2017] predniSONE (DELTASONE) 2.5 MG tablet Take 3 tablets (7.5 mg) by mouth daily   vancomycin 1,500 mg Inject 1,500 mg into the vein every 12 hours   zinc sulfate (ZINCATE) 220 (50 ZN) MG capsule Take 1 capsule (220 mg) by mouth daily   ascorbic acid 500 MG TABS Take 1 tablet (500 mg) by mouth daily   beta carotene 51351 UNIT capsule Take 1 capsule (25,000 Units) by mouth daily   acetaminophen (TYLENOL) 325 MG tablet Take 2 tablets (650 mg) by mouth every 4 hours as needed for mild pain   apixaban ANTICOAGULANT (ELIQUIS) 2.5 MG tablet Take 1 tablet (2.5 mg) by mouth 2 times daily   cetirizine (ZYRTEC) 10 MG tablet Take 1 tablet (10 mg) by mouth daily   atenolol (TENORMIN) 50 MG tablet Take 1 tablet (50 mg) by mouth daily   digoxin (LANOXIN) 250 MCG tablet Take 1 tablet (250 mcg) by mouth daily   triamcinolone (KENALOG) 0.1 % ointment Apply topically 2 times daily   senna-docusate (SENOKOT-S;PERICOLACE) 8.6-50 MG per tablet Take 1-2 tablets by mouth 2 times daily   Calcium Carb-Cholecalciferol 600-800 MG-UNIT TABS Take 2  "tablets by mouth every morning   Coenzyme Q10 (COQ-10 PO) Take 1 tablet by mouth daily In the morning   gabapentin (NEURONTIN) 100 MG capsule Take 2 capsules (200 mg) by mouth 3 times daily (Patient taking differently: Take 200 mg by mouth Take 2 capsules (200 mg) by mouth every 6 hours (4 am, 10am, 4pm, 10pm))   folic acid (FOLVITE) 1 MG tablet Take 1 mg by mouth daily.         Family History   (Reviewed and updated)  Family History   Problem Relation Age of Onset     Breast Cancer Mother      Hypertension Mother      Alzheimer Disease Mother      Hypertension Father      Blood Disease Father      Lymphoa     Circulatory Father      A Fib     DIABETES Paternal Grandmother          Social History   (Reviewed and updated)  Social History   Substance Use Topics     Smoking status: Never Smoker     Smokeless tobacco: Never Used     Alcohol use Yes      Comment: Glass of wine two evenings a night           Physical Exam   Vitals:[SL1.1] Blood pressure 108/64, temperature 98.1  F (36.7  C), temperature source Oral, resp. rate 16, height 1.48 m (4' 10.25\"), weight 76.2 kg (168 lb 1.6 oz), SpO2 99 %, not currently breastfeeding.[SL1.4]    Gen: Resting comfortably, in no acute distress  Head: Normocephalic, atraumatic   Eyes: SHERRIE, EOMI   ENT: No sinus tenderness, oropharynx clear with no erythema or exudates, no LAD, neck supple   CV: Normal rate[SL1.1] (88)[SL1.3],[SL1.1] ir[SL1.3]regular[SL1.1]ly irregular[SL1.3] rhythm, no murmurs/gallops/rubs  Resp: Non-labored breathing on[SL1.1] RA[SL1.3],[SL1.1] good air movement bilaterally, fine bibasilar crackles[SL1.3]   Abd: +BS, soft, NTND, no costovertebral angle tenderness, no peritoneal signs   Ext: Warm, well-perfused,[SL1.1] 1+ edema (in freshly placed lymphedema wraps)[SL1.3]       Data   Labs:  BMP  Recent Labs  Lab 06/19/17  1612 06/19/17  0608 06/16/17  0609 06/15/17  1753    135 136 134   POTASSIUM 4.7 4.6 4.0 4.4   CHLORIDE 105 104 103 102   VIKTORIYA 7.5* 7.8* " 7.4* 7.3*   CO2 24 21 28 26   BUN 32* 31* 27 30   CR 0.71 0.66 0.66 0.69   * 159* 141* 190*     CBC  Recent Labs  Lab 06/19/17  1612 06/19/17  0608 06/16/17  0609 06/15/17  1753   WBC 3.1* 2.5* 2.3* 2.7*   RBC 2.79* 3.16* 2.90* 2.75*   HGB 9.0* 10.2* 9.2* 9.0*   HCT 26.4* 30.6* 27.9* 26.5*   MCV 95 97 96 96   MCH 32.3 32.3 31.7 32.7   MCHC 34.1 33.3 33.0 34.0   RDW 20.6* 20.5* 20.1* 19.9*   PLT 44* 51* 51* 52*     INR  Recent Labs  Lab 06/19/17  1612 06/16/17  0609 06/15/17  0556 06/14/17  0554   INR 1.44* 1.36* 1.21* 1.18*     LFTs  Recent Labs  Lab 06/19/17  0608 06/16/17  0609 06/15/17  0556   ALKPHOS 180* 159* 168*   AST 41 34 31   ALT 63* 60* 73*   BILITOTAL 0.7 0.8 0.8   PROTTOTAL 5.4* 4.6* 4.9*   ALBUMIN 1.8* 1.8* 1.8*        EKG:[SL1.1]  Atrial fibrillation, rate 80     Microbiology:    Wound culture (aerobic + anaerobic) pending  Gram stain:   Few Gram positive cocci   Rare Gram negative rods   No WBC's seen    Urine Culture pending      Imaging  All images reviewed.  Relevant imaging noted below    CXR  IMPRESSION: Multiple lines and tubes overlying the patient. Small  right pleural effusion is unchanged. No left pleural effusion. No  pneumothorax. Enteric jejunal feeding tube is unchanged. Hazy  opacities are seen throughout the left lung, unchanged. Elevation of  the right hemidiaphragm is again noted.[SL1.3]      I interviewed and examined the patient with the house staff.  I agree with the assessment and plan as documented.    Selvin Gandara MD, PhD  Professor of Medicine  Division of Cardiology[DD1.1]                 Revision History        User Key Date/Time User Provider Type Action    > DD1.1 6/20/2017  1:06 PM Selvin Gandara MD Physician Sign     SL1.4 6/19/2017 11:26 PM Ernestina Garcia MD Resident Sign     SL1.3 6/19/2017 10:06 PM Ernestina Garcia MD Resident      SL1.2 6/19/2017  9:03 PM Ernestina Garcia MD Resident      SL1.1 6/19/2017  8:06 PM Ernestina Garcia MD  Resident                   Discharge Summaries     No notes of this type exist for this encounter.         Consult Notes      Consults by Priscilla Shanks MD at 7/31/2017 12:53 PM     Author:  Priscilla Shanks MD Service:  Physical Medicine and Rehabilitation Author Type:  Physician    Filed:  8/2/2017 10:41 AM Date of Service:  7/31/2017 12:53 PM Creation Time:  7/31/2017 12:53 PM    Status:  Signed :  Priscilla Shanks MD (Physician)     Consult Orders:    1. Physical Medicine & Rehab General Adult IP Consult: Patient to be seen: Routine within 24 hrs; Call back #: [phone] or pager [phone]; ARU recommendation from PT; Consultant may enter orders: Yes [[id number]] ordered by Kalpana Wren PA-C at 07/28/17 1700                [facility]   PM&R CONSULT - FOLLOW-UP    Consulting Provider: Kalpana Wren PA-C   Reason for Consult: ARU recommendation from PT  Location of Patient: [id number]  Date of Encounter: 7/31/2017     Information within this consult was gathered from patient interview and careful chart review.     ASSESSMENT/PLAN:    Ms. Amelia Michel is a  56 year old right hand dominant female who is know to PM&R service.  She has a complicated past medical history including cardiac arrest May 29, 2017 requiring ECMO with post-arrest multiorgan failure including pancytopenia, shock liver, hypoxic respiratory failure, acute kidney injury, anoxic brain injury and critical illness myopathy in addition to ESBL UTI and aspiration pneumonia.  She also has a history of rheumatoid arthritis, a-fib, VSD repair in 1969 and neuropathy of unknown etiology.  She was admitted to rehab June 16-19, 2017 when she was transferred back to [facility] hospital for fever/sepsis.  She was unfortunately then diagnosed with stage IV diffuse large B cell lymphoma. Currently, her chemo therapy plan is for current round to finish this week, and then for her  next round of chemotherapy to begin in 4 weeks. Per Haem-Onc team, their goal is curative treatment.    At present, she remains below baseline in terms of her functional status.  She has deficits in strength, mobility, ADLs, and activity tolerance that are well below baseline. She also has ongoing medical needs, with bradycardia, potassium and blood count monitoring needed due to chemotherapy.[SL1.1]  We feel she would benefit from the intensive therapy offered in an acute rehab setting.[SL1.2]     1. Recommend[SL1.1] transfer to acute rehab unit once medically appropriate per primary team.     2. Continue PT/OT in the interim.[SL1.2]     Attending's note   Thank you for allowing us to participate in the care of this patient.   We were asked to see this patient by Kalpana Wren PA-C  to evaluate rehabilitation needs.   I saw and evaluated this patient today. I reviewed today's vitals signs, lab values, imaging, medications, and current issues including medical, functional and social.   I have reviewed the residents note and agree with the plan of care listed above.[FI1.1]   Amelia Michel[FI1.2] is a[FI1.1] 56 year old[FI1.2] female who is well known to us the PM&R Service from previous consult and ARU visit in July 16-19, when she was transferred to the [facility] for fever. Since then she has been diagnosed with B cell lymphoma. She is currently undergoing chemotherapy, which should be completed tommorow Tuesday. However she has bradycardia which needs to be addressed. She has   She continues to have Left hand dexterity impairments, and impaired balance as well as impaired endurance. She is very motivated. Due to the medical complexity of her case, we recommend resuming intense rehabilitation in ARU setting. She is agreeable. Her  Wolf is at her bedside and endorses assistance if needed.   ELOS is 1-2 weeks  Therapy needs are PT/OT 90 min daily in each discipline and rehab nursing.      My floor time today  for this patient;  65 minutes, more than 50% of which was spent in coordination of care and counseling.     Priscilla Sahnks MD[FI1.1]          HISTORY OF PRESENTING ILLNESS:      Ms. Amelia Michel is a 56 year old right hand dominant female with a past medical history of  rheumatoid arthritis, a-fib, VSD repair in 1969 and neuropathy of unknown etiology. She unfortunately experienced a sudden cardiac arrest of unclear etiology May 29, 2017 requiring ECMO with post-arrest multiorgan failure including pancytopenia, shock liver, hypoxic respiratory failure, acute kidney injury, anoxic brain injury and critical illness myopathy in addition to ESBL UTI and aspiration pneumonia.    She was admitted to rehab June 16-19, 2017 and was good rehab candidate however she was transferred back to acute hospital for fever/sepsis workup.  She was unfortunately then diagnosed with stage IV diffuse large B cell lymphoma. She has been receiving chemotherapy and currently, her chemo therapy plan is for current round to finish this week, and then for her next round of chemotherapy to begin in 4 weeks. Per Haem-Onc team, their goal is curative treatment.      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 She was independent with all ADLs and IADLs prior to May 29, 2017 cardiac arrest.     CURRENT FUNCTION     PT:    Pt ambulated 2 x ~250' with FWW and supervision. Taking brief sitting rest break during each bout. She performed seated TE to promote LE strengthening and ROM. Educated pt and SO on lab values and implications for exercises - will likely need reinforcement. Tolerated session well today, but remains limited by dec activity tolerance.     OT:    Patient ambulates and performs sit to stand SBA with FWW. Patient with decreased L hand coordination and strength impacting lower body dressing and dexterity with fine motor tasks. Patient tolerates standing at sink for modified  bathing ~20 min, requires seated rest break after cleaning periarea due to anterior lean. Patient dons brief with set up assist, mod A to don socks.      SOCIAL HISTORY/HOME SETTING/SUPPORT:   Lives with her  in a house with 2 stairs to enter and 0 stairs within.  is a former respiratory therapist now involved in research per chart review; he works but can take time off as needed.       Social History     Social History     Marital status:      Spouse name: Romeo Michel     Number of children: 0     Years of education: N/A     Occupational History      [employer]     Social History Main Topics     Smoking status: Never Smoker     Smokeless tobacco: Never Used     Alcohol use Yes      Comment: Glass of wine two evenings a night     Drug use: No     Sexual activity: Not on file     Other Topics Concern     Parent/Sibling W/ Cabg, Mi Or Angioplasty Before 65f 55m? No     Social History Narrative         PAST MEDICAL HISTORY:  Past Medical History:   Diagnosis Date     Hypertension      Rheumatoid arthritis(714.0)          CURRENT MEDICATIONS:  Current Facility-Administered Medications   Medication     insulin aspart (NovoLOG) inj (RAPID ACTING)     insulin aspart (NovoLOG) inj (RAPID ACTING)     potassium chloride SA (K-DUR/KLOR-CON M) CR tablet 20 mEq     glucose 40 % gel 15-30 g    Or     dextrose 50 % injection 25-50 mL    Or     glucagon injection 1 mg     sennosides (SENOKOT) tablet 1-2 tablet     atenolol (TENORMIN) tablet 25 mg     melatonin tablet 1-3 mg     etoposide (TOPOSAR) 40 mg in NaCl 0.9 % 552 mL CHEMOTHERAPY     polyethylene glycol (MIRALAX/GLYCOLAX) Packet 17 g     furosemide (LASIX) 100 mg in NaCl 0.9 % 100 mL infusion     allopurinol (ZYLOPRIM) tablet 300 mg     acyclovir (ZOVIRAX) tablet 400 mg     fluconazole (DIFLUCAN) tablet 200 mg     sodium chloride (PF) 0.9% PF flush 5-50 mL     heparin lock flush 10 UNIT/ML injection 2-5 mL     ondansetron  (ZOFRAN) tablet 16 mg     [START ON 8/1/2017] fosaprepitant (EMEND) 150 mg in NaCl 0.9 % intermittent infusion     [START ON 8/2/2017] dexamethasone (DECADRON) tablet 8 mg     predniSONE (DELTASONE) tablet 50 mg     Chemotherapy Infusing-Continuous Infusion     [START ON 8/2/2017] filgrastim 15 mcg/mL (in Dextrose) (NEUPOGEN) infusion 350 mcg     prochlorperazine (COMPAZINE) tablet 10 mg     prochlorperazine (COMPAZINE) injection 10 mg     LORazepam (ATIVAN) tablet 0.5-1 mg     LORazepam (ATIVAN) injection 0.5-1 mg     MEDICATION INSTRUCTION     methylPREDNISolone sodium succinate (solu-MEDROL) injection 125 mg     diphenhydrAMINE (BENADRYL) injection 50 mg     meperidine (DEMEROL) injection 25 mg     EPINEPHrine (ADRENALIN) injection 0.3 mg     albuterol (PROAIR HFA/PROVENTIL HFA/VENTOLIN HFA) Inhaler 1-2 puff     albuterol neb solution 2.5 mg     0.9% sodium chloride infusion     vinCRIStine (ONCOVIN) 0.66 mg, DOXOrubicin (ADRIAMYCIN) 17 mg in NaCl 0.9 % 1,059 mL CHEMOTHERAPY     [START ON 8/1/2017] cyclophosphamide (CYTOXAN) 1,240 mg in NaCl 0.9 % 612 mL CHEMOTHERAPY     sodium chloride (PF) 0.9% PF flush 10-20 mL     sodium chloride (PF) 0.9% PF flush 10 mL     potassium chloride SA (K-DUR/KLOR-CON M) CR tablet 20-40 mEq     potassium chloride (KLOR-CON) Packet 20-40 mEq     potassium chloride 10 mEq in 100 mL intermittent infusion     potassium chloride 10 mEq in 100 mL intermittent infusion with 10 mg lidocaine     potassium chloride 20 mEq in 50 mL intermittent infusion     [START ON 8/4/2017] predniSONE (DELTASONE) tablet 5 mg     traZODone (DESYREL) quarter-tab 12.5 mg     morphine 0.1% in solosite topical gel 2 g     sodium chloride (PF) 0.9% PF flush 1-74 mL     acetaminophen (TYLENOL) tablet 650 mg     thiamine tablet 50 mg     folic acid (FOLVITE) tablet 1 mg     midodrine (PROAMATINE) tablet 10 mg     miconazole (MICATIN; MICRO GUARD) 2 % powder     pantoprazole (PROTONIX) EC tablet 40 mg     digoxin  (LANOXIN) tablet 125 mcg     ondansetron (ZOFRAN) tablet 4 mg     ondansetron (ZOFRAN) injection 4 mg     simethicone (MYLICON) chewable tablet 80 mg     sodium chloride (PF) 0.9% PF flush 10-20 mL     sodium chloride (PF) 0.9% PF flush 10 mL     heparin lock flush 10 UNIT/ML injection 5-10 mL     dextrose 10 % 1,000 mL infusion     gabapentin (NEURONTIN) capsule 200 mg     multivitamin, therapeutic with minerals (THERA-VIT-M) tablet 1 tablet     sodium chloride (PF) 0.9% PF flush 3 mL     medication instruction     naloxone (NARCAN) injection 0.1-0.4 mg     calcium-vitamin D (CALTRATE) 600-400 MG-UNIT per tablet 2 tablet     morphine 0.1% in solosite topical gel     magnesium sulfate 2 g in NS intermittent infusion ([facility] or [facility] C[facility]d)     magnesium sulfate 4 g in 100 mL sterile water (premade)     sodium phosphate 10 mmol in D5W intermittent infusion     sodium phosphate 15 mmol in D5W intermittent infusion     sodium phosphate 20 mmol in D5W intermittent infusion     sodium phosphate 25 mmol in D5W intermittent infusion     traMADol (ULTRAM) half-tab 25 mg         REVIEW OF SYSTEMS:  General: feels okay overall, all things considered  Head: denies headache  Eyes/Nose/Throat: wears glasses but denies change in vision, sore throat or difficulty chewing/swallowing  Cardiovascular: notes that she can feel palpitations when she is in a-fib which is usually daily and can last up to an hour, denies chest pain  Pulmonary: denies cough or shortness of breath  Gastrointestinal: denies nausea, vomiting, diarrhea or constipation  Extremities: endorses left upper limb swelling, denies joint pain  Neurological: endorses chronic right numbness extending from right heel along posterior limb to right buttocks as well as saddle anesthesia, also endorses tingling over first two digits of left hand which she thinks is secondary to swelling, endorses global weakness but denies focal weakness. Skin: reports chronic wound over left  "elbow (present since resuscitation May 29th) which is slowling improving, denies rash, pruritis  Psychiatry: endorses stable mood - states that she is taking it \"one day at a time\" and has been doing well with managing her ongoing medical issues this way.       LABORATORY RESULTS   Lab Results   Component Value Date    WBC 2.3 (L) 07/31/2017    HGB 8.3 (L) 07/31/2017    HCT 25.7 (L) 07/31/2017    MCV 94 07/31/2017    PLT 20 (LL) 07/31/2017     Lab Results   Component Value Date     07/31/2017    POTASSIUM 3.2 (L) 07/31/2017    CHLORIDE 96 07/31/2017    CO2 33 (H) 07/31/2017     (H) 07/31/2017     Lab Results   Component Value Date    GFRESTIMATED >90  Non  GFR Calc   07/31/2017    GFRESTBLACK >90   GFR Calc   07/31/2017     Lab Results   Component Value Date    AST 80 (H) 07/31/2017    ALT 55 (H) 07/31/2017    ALKPHOS 295 (H) 07/31/2017    BILITOTAL 1.7 (H) 07/31/2017    BILICONJ 0.0 12/06/2005     Lab Results   Component Value Date    INR 1.36 (H) 07/31/2017     Lab Results   Component Value Date    BUN 36 (H) 07/31/2017    CR 0.58 07/31/2017         PHYSICAL EXAMINATION:  Vitals:    07/30/17 2259 07/31/17 0819 07/31/17 0854 07/31/17 1125   BP: 137/58 136/69  141/76   BP Location:    Left leg   Pulse:    60   Resp:  20  20   Temp: 97.5  F (36.4  C) 97.6  F (36.4  C)  96.8  F (36  C)   TempSrc: Axillary Axillary  Axillary   SpO2:  93%  95%   Weight:   66.9 kg (147 lb 6.4 oz)    Height:           GENERAL: alert and oriented, no acute distress, conversational and pleasant, lying on bed in hospital room, chemo infusing,  at bedside.   NEUROLOGIC:    - alert and oriented   - extraocular muscles in tact, gaze conjugate, good eye contact, wearing glasses  - sensation in tact to light touch over upper and lower face  - no facial asymmetry or weakness over upper or lower face  - hearing in tact to conversation and rustling sound of gloves beside either ear  - shoulder " shrug in tact bilaterally  - strength:    - 4-/5 in left  strength, finger abduction, wrist extension   - 4/5 if left elbow flexion/extension, straight leg raise and plantar/dorsiflexion   - 4/5 in right  strength, finger abduction, straight leg raise, plantar/dorsiflexion and great toe extension  - decreased sensation over digits 1 and 2 of left hand and surrounding elbow, sensation in tact to light touch over remainder of left upper limb and right upper limb  - decreased sensation over posterior surface of right lower limb but intact over anterior surface, also intact over left lower emelia  HEENT: Normocephalic, atraumatic.   CARDIOVASCULAR: regular rate and rhythm, compression socks on for edema - pitting edema appreciated at ankles through socks, extremities well perfused.   PULMONARY/CHEST: speaking in full sentences, no cyanosis, no respiratory distress, breathing comfortably on room air, symmetrical chest wall expansion, lungs clear to auscultation bilaterally  ABDOMEN: Positive bowel sounds, non-distended, soft, non-tender, no rebound or guarding.  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   SKIN: warm and well perfused  PSYCHIATRIC: affect appropriate to conversation, cooperative, pleasant.     --  Patient discussed with attending Dr. Rimma Stinson-Kasey Wolf MD  PM&R Resident, PGY-4[SL1.1]       Revision History        User Key Date/Time User Provider Type Action    > FI1.2 8/2/2017 10:41 AM Priscilla Shanks MD Physician Sign     FI1.1 8/2/2017 10:35 AM Priscilla Shanks MD Physician      SL1.2 7/31/2017  4:15 PM La Nena Wolf MD Resident Pend     SL1.1 7/31/2017 12:53 PM La Nena Wolf MD Resident             Consults by Aleksander Bhatia MD at 8/1/2017  5:49 PM     Author:  Aleksander Bhatia MD Service:  Neurology Author Type:  Physician     "Filed:  8/1/2017  6:33 PM Date of Service:  8/1/2017  5:49 PM Creation Time:  8/1/2017  5:20 PM    Status:  Signed :  Aleksander Bhatia MD (Physician)     Consult Orders:    1. Neurology General Adult IP Consult: Patient to be seen: Routine within 24 hrs; Call back #: [phone] or [phone]; Baseline saddle anesthesia, urinary incontinence. Previously evaluated by neurosurg 3/2016 w/o explanation. Recently seen by neuro 6/... [[id number]] ordered by Shyann Marinelli PA-C at 07/31/17 0829                Neurology Consultation     Amelia Michel     56 year old         Reason for consult: I was asked by Dr Hale to evaluate this patient for chronic urinary incontinence, saddle anaesthesia, and paraesthesias in RLE.              HPI:    Bree is a 57 yo woman with a hx of B cell lymphoma stage 4,  VSD repair, RA, radiculopathy, Afib/Aflutter/SVT, hx of cardiac arrest outside of hospital on 5/29  and severe critical illness myopathy  Admitted for chimiotearapy.     She is known by neurology since 2016 with complain of  \"saddel anaesthesia\", RLE tingling, and chronic urinary incontinence. Except for her urinary incontinence, she states that these symptoms are largely unchanged.  She has been extensively investigate for this complain in the [facility] in 2016 and in a consult one month ago. She was also investigated by an outside neurologist.   Per the pt's , the pt recently rolled her ankle and was unaware/ did not feel it until it was quite swollen.     The pt states her urinary incontinence is chronic over the last year may be worse, but she is unsure because of having brand catheter. She describes it as \"dribbling\" when she is not catheterized. She is continent to bowel.     When asked what is her biggest complain, she refers the urinary incontinence that something that she would like to be resolved or better managed.     She denies any new numbness, weakness, confusion, alteration in her vision or " in her speech.                  Past Medical History:       Past Medical History         Past Medical History:   Diagnosis Date     Hypertension       Rheumatoid arthritis(714.0)                    Past Surgical History:       Past Surgical History          Past Surgical History:   Procedure Laterality Date     ANESTHESIA CARDIOVERSION N/A 5/17/2017     Procedure: ANESTHESIA CARDIOVERSION;  ANESTHESIA CARDIOVERSION;  Surgeon: GENERIC ANESTHESIA PROVIDER;  Location: [facility] OR     ARTHRODESIS WRIST   2000     Right wrist     BONE MARROW ASPIRATE &BIOPSY   7/12/2017            FOOT SURGERY         4 left and 2 right     RELEASE CARPAL TUNNEL BILATERAL         REPAIR VENTRICULAR SEPTAL DEFECT   1969                  Social History:              Social History    Substance Use Topics      Smoking status: Never Smoker      Smokeless tobacco: Never Used      Alcohol use Yes         Comment: Glass of wine two evenings a night                Family History:       Family History           Family History   Problem Relation Age of Onset     Breast Cancer Mother       Hypertension Mother       Alzheimer Disease Mother       Hypertension Father       Blood Disease Father         Lymphoa     Circulatory Father         A Fib     DIABETES Paternal Grandmother                    Allergies:            Allergies   Allergen Reactions     Metoprolol Other (See Comments)       Pt and  report that metoprolol does not work for her and also reports feeling unwell with this medication. She has been able to tolerate atenolol, which as worked in controlling her HR.      No Clinical Screening - See Comments         Penicillin Allergy Skin Test not performed, see antimicrobial management team progress note 7/5/17.     Penicillins       Tape [Adhesive Tape] Rash               Medications:   I have reviewed this patient's current medications           Review of Systems:   The Review of Systems is negative other than noted in the HPI except for  "dry mouth           Physical Exam:   Vital signs:  Temp: 96.8  F (36  C) Temp src: Axillary BP: 159/89   Heart Rate: 73 Resp: 16 SpO2: 93 % O2 Device: None (Room air) Oxygen Delivery: 2 LPM Height: 145.5 cm (4' 9.28\") Weight: 67.4 kg (148 lb 9.6 oz)  Estimated body mass index is 31.84 kg/(m^2) as calculated from the following:    Height as of this encounter: 1.455 m (4' 9.28\").    Weight as of this encounter: 67.4 kg (148 lb 9.6 oz).        Constitutional : well-built  Head: atraumatic, anicteric. See neuroexam  Eyes: see neuroexam  Extremities: compression stockings in place, minimal peripheral edema BL, multiple scars on right foot from pervious surgeries, and amputated great left toe  Psychiatry: in good mood. Collaborative     Neuroexam  Alert and oriented to place, date, self and reasons of hospitalization.  Follow commands  Face symmetric  Eyes: motility preserved, no nystagmus, pupils reactive and equal  Tongue centered  No tremors  No dysmetria (arms and legs tested)  No aphasia  mild dysarthria.      Strength:preserved on arms.   R leg: hip flexion 4+, Hip extension 4+, leg extension 5, foot movements 5  L Leg: hip flexion 4, hip extension 5, leg extension 5, dorsoflexion 4+, eversion 4, inversion 5, plantar flexion 5.   Abduction and adduction of the legs 4.   Reflexes: slightly increased on the right side with valdes on the right side.    Sensory: preserved  On arms and legs for pinprick. On genital area she has decreased sensation of the labia majora.                 Data:            Lab Results   Component Value Date     WBC 2.4 (L) 08/01/2017     HGB 7.9 (L) 08/01/2017     HCT 24.5 (L) 08/01/2017     PLT 18 (LL) 08/01/2017      08/01/2017     POTASSIUM 3.4 08/01/2017     CHLORIDE 98 08/01/2017     CO2 32 08/01/2017     BUN 30 08/01/2017     CR 0.54 08/01/2017      (H) 08/01/2017     SED 63 (H) 06/19/2017     DD 1.3 (H) 07/08/2017     NTBNPI 2348 (H) 06/26/2017     TROPONIN 0.19 () " 05/29/2017     TROPI 0.024 06/24/2017     AST 93 (H) 08/01/2017     ALT 77 (H) 08/01/2017     ALKPHOS 292 (H) 08/01/2017     BILITOTAL 1.4 (H) 08/01/2017     INR 1.36 (H) 07/31/2017      PET scan 7/18/2017:     ABDOMEN AND PELVIS:  There is hepatomegaly with diffuse involvement with FDG avid lesions  (max SUV is measured as 15.5 from the left lobe lesion).      There is a 2.9 x 2.9 cm hyperdense, partially FDG avid (max SUV 14.9)  pelvic lesion centered within the right posterior aspect of the  bladder and appears to be contiguous with the uterus. Another less FDG  avid focus inferior to this lesion centered within the uterine fundus  with Max SUV 5.34.     MRI lumbar with contrast:    L5-S1: Moderate bilateral facet arthropathy. There is a small synovial  cyst along the anterior aspect of the right facet joint in the  extraforaminal zone that contacts, but does not clearly impinge upon  the exiting right L5 nerve root. No significant spinal canal or neural  foraminal narrowing.      MRI thoracic: . Worsened midline and bilateral paramedian disc  protrusion/sequestration posterior to T12, right greater than left.  2. No abnormal enhancing lesions or other findings to suggest  infection. Relative hypointensity of CSF on T2-weighted imaging is  favored to be artifactual.  3. Redemonstration of anterior compression of the spinal cord at T3-4,  which again may be due to a arachnoid cyst.    MRI cervical:  Impression:   1. No abnormal enhancement in the spinal cord, thecal sac or cervical  vertebrae.  2. 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 Stable  resultant grade 1 anterolisthesis of C4 on C5, grade 2 anterolisthesis  of C5 and C6, and grade 1 anterolisthesis of C7 on T1.  3. Stable multilevel cervical spondylosis.    MRI lumbosacral plexus:  There is some mild rightward convex curvature to the lumbar  spine. Minimal retrolisthesis is present at L1-L2, measuring  approximately 1 mm. Posterior alignment  is otherwise normal. Disc  space narrowing is present at L1-L2, L2-L3, and L3-L4. There are some  focal disc protrusions or posterior osteophytes at the same levels,  but there is no significant stenosis. The lumbosacral plexus nerve  roots are visualized and are normal in appearance without any focal  masses any signal abnormalities. The bladder and pelvic structures  appear to be within normal limits. The iliopsoas muscles appear  normal. Incidental note is made of a 1 cm cyst in what is probably the  right adnexa.    MRI brain : No abnormal foci restricted diffusion. No T2 signal abnormality in the  deep gray nuclei or cerebral cortex. Minimal leukoaraiosis. Mild  generalized parenchymal volume loss. Ventricles are not dilated out of  proportion to the cerebral sulci. Patent major intracranial vascular  flow voids. No intracranial hemorrhage, mass effect, midline shift or  abnormal extra axial fluid collection. Bilateral mastoid effusions.              Assessment and Plan:    57 yo F with stage 4 lymphoma and post cardiac arrest on QT being consulted for ' saddle anestesia' , urinary incontinence and paresthesias.     Subjective saddle anesthesia: Objective only present on labia majora. Lumbosacral image is negative.     Paresthesias on RLE: Sensation preserved on examen. On image no significant narrowing    Urinary incontinence: Unclear the pattern. She in on brand and on lasix drip. On exam no signs of upper motor syndrome.    There was a discussion with patient about the extent of investigation and her goals of care. She would benefit with a urodynamic study to know the pattern of her incontinence (detrusor hyperactivity X overflow) , she seems to be ok with this kind of investigation in a outpatient basis. She refuses [facility] / nerve studies. Another possible investigation are CSF studies to investigate CNS involvement in lymphoma. She refers that it is in the plan of the primary team in the future.     In terms  of management of her urinary symptoms, a urodynamic study by urology could guide, but a trial of medications of schedule catheter can be done. This should be a discussion among the patient, primary team and palliative care.           -Neurology signs off    Thank you for this consult, please contact Neurology Consult service through pager.          Attestation:  Patient seen and discussed with Dr. Sriram Boothe  PGY4 Neurology  [id number][EH1.1]     PATIENT SEEN AND EXAMINED BY ME on August 1, 2017 I AGREE WITH THE FINDINGS DETAILED BY THE NEUROLOGY RESIDENT and documented in their note on August 1, 2017   PLEASE REFER TO THEIR NOTE FOR ADDITIONAL DETAILS.     DIAGNOSIS  Complicated radiculopathy with bladder dysfunction and underlying cancer  MANAGEMENT agree with input from urology with possible urodynamic studies as outpt and consideration for pharmacological management of bladder vs use of intermittent or indwelling catheter vs suprapubic. Will be getting a spinal tap at some point per patient which can help determine if there is CSF evidence of disease which may affect treatment approach. Agree with helping patient formulate goals of care and extent of desired workup - emg, neurodynamic studies, treatment, etc.     ALEKSANDER BHATIA MD[PT1.1]       Revision History        User Key Date/Time User Provider Type Action    > PT1.1 8/1/2017  6:33 PM Aleksander Bhatia MD Physician Sign     EH1.1 8/1/2017  5:49 PM Zaira Boothe MD Resident Sign            Consults by Larry Prieto MD at 6/26/2017  4:29 PM     Author:  Larry Prieto MD Service:  Cardiology Author Type:  Fellow    Filed:  6/26/2017  8:07 PM Date of Service:  6/26/2017  4:29 PM Creation Time:  6/26/2017  4:29 PM    Status:  Attested :  Larry Prieto MD (Fellow)    Cosigner:  Gala Dixon MD at 7/23/2017  8:30 PM         Consult Orders:    1. Cardiology Heart Failure (HF) IP Consult: Patient to be seen:  Routine within 24 hrs; Call back #: [phone]; assistance with volume overload (total body volume up, intravascular depletion), RHC pending; Consultant may enter orders: Yes [[id number]] ordered by Evie Longo MD at 06/26/17 1750           Attestation signed by Gala Dixon MD at 7/23/2017  8:30 PM        I have seen and examined the patient with the house staff on June 26, 2017 and agree with the outlined assessment and plan.      Gala Dixon MD   of Medicine   [facility] Division of Cardiology                                    Cardiology Consult      Patient Name: Amelia Michel MRN# [id number]   Age: 56 year old YOB: 1960     Date of Admission:6/19/2017             Assessment and Plan:   56 year old lady with advanced RA (on prednisone, MTX, plaquenil), HTN, Obesity, New Afib RVR presentation 5/15-5/19- failed DCCV, didn't tolerated sotalol, discharged on BB and amiodarone, Out of hospital arrest[PP1.1] from shock able rhythm[PP1.2] with ROSC hospitalzation 5/29 to 6/15, normal CORS[PP1.1] 5/29[PP1.2] c/b respiratory failure requiring mechanical ventilation, bone marrow failure, renal failure, shock liver, ESBL E. Coli UTI,[PP1.1] malnutrition,[PP1.2] discharged on Lifevest[PP1.1], admitted on 6/19 for increasing left upper arm wound and fever.[PP1.2]    # Anasarca-[PP1.1] from[PP1.2] 3rd spacing from low albumin[PP1.1] and some component of[PP1.2] intravascular hypervolemia  # Mild LV dysfunction EF 40-45%[PP1.1]-query tachycardia mediated[PP1.3]  # Afib/aflutter with RVR -120s  # Hypotension issues recently with rate controlling agents, diuretics etc.  # LUE wound pain/fever reason for hospitalization this time[PP1.1] 6/19[PP1.2]    # New Afib RVR presentation 5/15-5/19- failed DCCV, didn't tolerated sotalol, discharged on BB and amiodarone  # Out of hospital arrest[PP1.1] from shock able rhythm[PP1.2] with ROSC hospitalzation 5/29 to  6/15, normal CORS[PP1.1] 5/29[PP1.2] c/b respiratory failure requiring mechanical ventilation, bone marrow failure, renal failure, shock liver, ESBL E. Coli UTI,[PP1.1] malnutrition,[PP1.2] discharged on Lifevest[PP1.1],[PP1.2]      Recommendations[PP1.1]  -consider giving lasix with albumin to mobilize fluid from interstitial place  -optimize nutritional status[PP1.2] as able    s[PP1.3]taff with Dr. Zack Prieto  General Cardiology Fellow, PGY4  Pager [phone]             Chief Complaint:   Volume management           HPI:   56 year old lady with advanced RA (on prednisone, MTX, plaquenil), HTN, Obesity, New Afib RVR presentation 5/15-5/19- failed DCCV, didn't tolerated sotalol, discharged on BB and amiodarone, Out of hospital arrest[PP1.1] from shock able rhythm[PP1.2] with ROSC hospitalzation 5/29 to 6/15, normal CORS[PP1.1] 5/29[PP1.2] c/b respiratory failure requiring mechanical ventilation, bone marrow failure, renal failure, shock liver, ESBL E. Coli UTI,[PP1.1] malnutrition,[PP1.2] discharged on Lifevest[PP1.1], admitted on 6/19 for increasing left upper arm wound and fever.  Here she has had Afib/aflutter rvr, hypotension, diarrhea. She is deconditioned. She denies chest pain, orthopnea, palpitations, presyncope.  HR 120s SBP 80-90/60 2L NC lactate mildly elevated. Pancytopenia plt 25 normal Cr  ECG aflutter vs atrial tachycardia. ECG from 5/29 shows atrial paced v senese rhythm with prolonged QTc (she had temporary pacer at the time) CXR small right pleural effusion  Cardiac MRI 5/15 normal LV and RV EF[PP1.2]           Past Medical History:[PP1.1]     Past Medical History:   Diagnosis Date     Hypertension      Rheumatoid arthritis(714.0)[PP1.4]               Past Surgical History:[PP1.1]     Past Surgical History:   Procedure Laterality Date     ANESTHESIA CARDIOVERSION N/A 5/17/2017    Procedure: ANESTHESIA CARDIOVERSION;  ANESTHESIA CARDIOVERSION;  Surgeon: GENERIC ANESTHESIA PROVIDER;   Location: [facility] OR     ARTHRODESIS WRIST  2000    Right wrist     FOOT SURGERY      4 left and 2 right     RELEASE CARPAL TUNNEL BILATERAL       REPAIR VENTRICULAR SEPTAL DEFECT  1969[PP1.4]              Social History:[PP1.1]     Social History     Social History     Marital status:      Spouse name: Romeo Michel     Number of children: 0     Years of education: N/A     Occupational History      [employer]     Social History Main Topics     Smoking status: Never Smoker     Smokeless tobacco: Never Used     Alcohol use Yes      Comment: Glass of wine two evenings a night     Drug use: No     Sexual activity: Not on file     Other Topics Concern     Parent/Sibling W/ Cabg, Mi Or Angioplasty Before 65f 55m? No     Social History Narrative[PP1.4]            Family History:[PP1.1]     Family History   Problem Relation Age of Onset     Breast Cancer Mother      Hypertension Mother      Alzheimer Disease Mother      Hypertension Father      Blood Disease Father      Lymphoa     Circulatory Father      A Fib     DIABETES Paternal Grandmother[PP1.4]                 Allergies:[PP1.1]      Allergies   Allergen Reactions     Penicillins      Tape [Adhesive Tape] Rash[PP1.4]            Medications:[PP1.1]     Prescriptions Prior to Admission   Medication Sig Dispense Refill Last Dose     ertapenem (INVANZ) 1 GM vial Inject 1 g into the vein every 24 hours 10 each  Unknown at Unknown time     insulin aspart (NOVOLOG PEN) 100 UNIT/ML injection Inject 1-7 Units Subcutaneous 3 times daily (before meals)   Unknown at Unknown time     insulin aspart (NOVOLOG PEN) 100 UNIT/ML injection Inject 1-5 Units Subcutaneous At Bedtime   Unknown at Unknown time     melatonin 3 MG tablet Take 1 tablet (3 mg) by mouth nightly as needed for sleep   Unknown at Unknown time     melatonin 1 MG TABS tablet Take 1 tablet (1 mg) by mouth At Bedtime   Unknown at Unknown time     morphine 0.1% in solosite topical gel Place 2 g  onto the skin 3 times daily  0 Unknown at Unknown time     multivitamin, therapeutic with minerals (THERA-VIT-M) TABS tablet Take 1 tablet by mouth daily 30 each 0 Unknown at Unknown time     [START ON 7/15/2017] predniSONE (DELTASONE) 5 MG tablet Take 1 tablet (5 mg) by mouth daily   Unknown at Unknown time     predniSONE (DELTASONE) 10 MG tablet Take 1 tablet (10 mg) by mouth daily (Patient taking differently: Take 10 mg by mouth daily Take 10mg daily, last dose 6/28)   Unknown at Unknown time     [START ON 7/1/2017] predniSONE (DELTASONE) 2.5 MG tablet Take 3 tablets (7.5 mg) by mouth daily (Patient taking differently: Take 7.5 mg by mouth daily Start 6/29 for 2 weeks)   Unknown at Unknown time     vancomycin 1,500 mg Inject 1,500 mg into the vein every 12 hours   Unknown at Unknown time     zinc sulfate (ZINCATE) 220 (50 ZN) MG capsule Take 1 capsule (220 mg) by mouth daily   Unknown at Unknown time     ascorbic acid 500 MG TABS Take 1 tablet (500 mg) by mouth daily 30 tablet  Unknown at Unknown time     beta carotene 60777 UNIT capsule Take 1 capsule (25,000 Units) by mouth daily   Unknown at Unknown time     acetaminophen (TYLENOL) 325 MG tablet Take 2 tablets (650 mg) by mouth every 4 hours as needed for mild pain 100 tablet  Unknown at Unknown time     apixaban ANTICOAGULANT (ELIQUIS) 2.5 MG tablet Take 1 tablet (2.5 mg) by mouth 2 times daily   Unknown at Unknown time     cetirizine (ZYRTEC) 10 MG tablet Take 1 tablet (10 mg) by mouth daily 30 tablet  Unknown at Unknown time     atenolol (TENORMIN) 50 MG tablet Take 1 tablet (50 mg) by mouth daily 30 tablet  Unknown at Unknown time     digoxin (LANOXIN) 250 MCG tablet Take 1 tablet (250 mcg) by mouth daily 30 tablet  Unknown at Unknown time     triamcinolone (KENALOG) 0.1 % ointment Apply topically 2 times daily   Unknown at Unknown time     senna-docusate (SENOKOT-S;PERICOLACE) 8.6-50 MG per tablet Take 1-2 tablets by mouth 2 times daily 100 tablet   "Unknown at Unknown time     Calcium Carb-Cholecalciferol 600-800 MG-UNIT TABS Take 2 tablets by mouth every morning   Unknown at Unknown time     Coenzyme Q10 (COQ-10 PO) Take 1 tablet by mouth daily In the morning   Unknown at Unknown time     gabapentin (NEURONTIN) 100 MG capsule Take 2 capsules (200 mg) by mouth 3 times daily (Patient taking differently: Take 200 mg by mouth Take 2 capsules (200 mg) by mouth every 6 hours (4 am, 10am, 4pm, 10pm)) 180 capsule 3 Unknown at Unknown time     folic acid (FOLVITE) 1 MG tablet Take 1 mg by mouth daily.   Unknown at Unknown time[PP1.4]             Review of Systems:   A 14 point ROS was completed and is negative other than what is stated in the HPI.          Physical Exam:[PP1.1]   Blood pressure 90/74, pulse 120, temperature 97.7  F (36.5  C), temperature source Oral, resp. rate 28, height 1.48 m (4' 10.25\"), weight 86.2 kg (190 lb), SpO2 98 %, not currently breastfeeding.[PP1.4]  Gen: in no acute distress   Head: atraumatic   Eyes: EOM intact   Mouth: no pharyngeal exudate[PP1.1], NG tube[PP1.2]  Lymph node: not enlarged on head or neck   CV:[PP1.1] tachy[PP1.2], S1 & S2, no murmurs/gallops[PP1.1], 5mm pitting edema from feet to thighs, 3mm pitting edema in hands[PP1.2]  Lungs:[PP1.1] decreased breath sounds right base. Clear otherwise[PP1.2]  Abd: soft, nontender, BS present[PP1.1]   Skin: no rash[PP1.2]      Tubes/Lines/Devices:   PICC Triple Lumen 06/26/17 Right Basilic ok to use. (Active)   Site Assessment WDL 6/26/2017  1:29 PM   External Cath Length (cm) 3 cm 6/26/2017  1:29 PM   Extremity Circumference (cm) 35 cm 6/26/2017  1:29 PM   Dressing Intervention Chlorhexidine patch;Transparent;Securing device;New dressing 6/26/2017  1:29 PM   Dressing Change Due 07/03/17 6/26/2017  1:29 PM   PICC Lumen Assessment Trigger Red;White;Gray 6/26/2017  1:29 PM   Number of days:0            Data:   Laboratory data reviewed.     Cultures:[PP1.1]   Invalid input(s): BC "       Recent Labs  Lab 06/26/17  1115 06/26/17  1016 06/26/17  1013   CULT Pending No growth after 2 hours No growth after 2 hours       No results for input(s): [facility] in the last 168 hours.    Unresulted Labs Ordered in the Past 30 Days of this Admission     Date and Time Order Name Status Description    6/26/2017 0935 Urine Culture Aerobic Bacterial Preliminary     6/26/2017 0935 Blood culture Preliminary     6/26/2017 0935 Blood culture Preliminary     5/29/2017 1046 Adalimumab Activity and Neutralizing Antibodies: Laboratory Miscellaneous Order In process[PP1.4]           Imaging/Studies reviewed.[PP1.1]                   Revision History        User Key Date/Time User Provider Type Action    > [N/A] 6/26/2017  8:07 PM Larry Prieto MD Fellow Sign     PP1.3 6/26/2017  8:06 PM Larry Prieto MD Fellow Sign     PP1.2 6/26/2017  7:58 PM Larry Prieto MD Fellow      PP1.4 6/26/2017  4:30 PM Larry Prieto MD Fellow      PP1.1 6/26/2017  4:29 PM Larry Prieto MD Fellow             Consults by Christos Quiles MD at 7/19/2017  7:07 PM     Author:  Christos Quiles MD Service:  Hem/Onc Author Type:  Physician    Filed:  7/21/2017  9:40 AM Date of Service:  7/19/2017  7:07 PM Creation Time:  7/19/2017  7:05 PM    Status:  Signed :  Christos Quiles MD (Physician)         Hematology -- Follow up[MR1.1] Note    Discussed with family the results of the PET scan concerning for lymphoma. Plan to arrange for biopsy of[AK1.1] a[MR1.1] liver lesion. Pt and family in agreement with the plan.    Fox Salinas MD  Hematology Oncology fellow  [phone][AK1.1]      HEMATOLOGY STAFF:  Patient not seen.  Case (including imaging studies) reviewed with fellow, whose note reflects our joint assessment and plan.  PET concerning for malignant process (lymphoma vs. other) -- need tissue to establish diagnosis.  Liver lesions appear most amenable to biopsy.    Christos Quiles,  MD  Associate Professor of Medicine  Division of Hematology, Oncology, and Transplantation  Director, [facility][MR1.1]         Revision History        User Key Date/Time User Provider Type Action    > MR1.1 7/21/2017  9:40 AM Christos Quiles MD Physician Sign     AK1.1 7/19/2017  7:08 PM Fox Salinas MD Fellow Sign            Consults by Betty Patrick MD at 7/20/2017  2:42 PM     Author:  Betty Patrick MD Service:  Palliative Author Type:  Physician    Filed:  7/20/2017  3:09 PM Date of Service:  7/20/2017  2:42 PM Creation Time:  7/20/2017  2:27 PM    Status:  Signed :  Betty Patrick MD (Physician)         [facility], [place]    Palliative Care Consultation Note    Patient: Amelia Michel  Date of Admission:  6/19/2017    Requesting provider/team: cardiology  Reason for consult: new dx of lymphoma, patient/family support    Recommendations:  Please see assessment below for rationale.  - start trazodone 25-50mg QHS PRN insomnia (start with 25mg, repeat 25mg if needed)[EJ1.1]--I ordered this for you[EJ1.2]  - will have palliative SW meet with patient this week vs next week to offer additional counseling support  - continue spiritual care support  - palliative team will follow but not daily; anticipate 1-2x per week, more if needed. Please contact us if there are unmet needs/questions.     These recommendations have been discussed with patient and family; page[EJ1.1]d[EJ1.2] primary team as well.    Thank you for the opportunity to participate in the care of this patient and family. Our team will follow 1-2x per week. Please feel free to contact the on-call Palliative provider with any urgent needs.     Betty Patrick  Pager: [phone]  [facility] Inpatient Team Consult pager [phone] (M-F 8-4:30)  After-hours Answering Service [phone]   Palliative Clinic: [phone]       Assessment &  "Plan   Amelia Michel is a 56 year old female with PMHx RA, VSD repair (1969), recent prolonged hospitalization for cardiac arrest complicated by cardiogenic shock who was slowly recovering at [facility] and was readmitted from [facility] with fever and workup has revealed lymphoma (based on BMBx and PET scan, tissue biopsy pending).     Symptoms:   - primary symptom currently is insomnia related to frequent interruptions for medical cares overnight. Pt open to trying a prn sleep aid and we recommended trazodone 25-50mg qhs prn.    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       Hobbies: crossw[employer] puzzles    Mental Health: denies hx of clinical depression or anxiety but has had depressed mood of/on related to physical limitations from RA and then more recently due to her prolonged admission and now this complication of lymphoma    Coping: coping ok but still processing new diagnosis, mood is \"low\" and would like additional support from  and SW counseling    Spiritual/Anabaptist:    Spiritual background: raised [employer], not regularly practicing but does go to mass off/on and is spiritual  Spiritual needs: ongoing spiritual care requested  Sense/Meaning Making: didn't assess    Prognostic Information: still awaiting liver biopsy and treatment plan from hematology. Pt aware that they are likely planning inpatient chemotherapy of some kind and is expecting a prolonged course with more deconditioning.    Advance Care Planning: not addressed today    History of Present Illness " "  Sources of History:patient and electronic health record    57 yo female with recent dx afib/flutter/SVT initially presented to [facility] 5/29 after out of hospital arrest with shockable rhythm, admitted 5/29-15 with complicated course involving cardiogenic shock with intubation and ICU stay. She was discharged to ARU 6/15-19 but developed worsening LUE wound and fever, admitted back to [facility] and further workup revealed lymphoma.[EJ1.1]    Met with patient and  and sister today. Pt was told yesterday about lymphoma diagnosis but awaiting definitve dx and treatment plan which will require liver bx tomorrow.     Today says she has some pain on her left UE arm wound but not that bothersome. Explains that the wound is related to chemical irritation from IV infiltration with a medication. Denies pain. Sometimes has some nausea but none today. Feels more fatigued with fevers and overall is deconditioned but was making progress at ARU. Having normal BMs, eating/drinking ok. 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 Her main symptom complaint is poor sleep--up many times overnight from vitals checks, urinary incontinence, other interruptions and has hard time falling back asleep. Does say she feels \"down\" but denies severe depression or anxiety. Says she does not have trouble with mind racing at night--she falls alseep but just can stay asleep.[EJ1.2]      ROS:  Palliative Symptom Review (0=no symptom/no concern, 1=mild, 2=moderate, 3=severe):  Pain:[EJ1.1] 1[EJ1.2]  Fatigue:[EJ1.1] 1[EJ1.2]  Nausea:[EJ1.1] 1[EJ1.2]  Constipation:[EJ1.1] 0[EJ1.2]  Diarrhea:[EJ1.1] 0[EJ1.2]  Depressive Symptoms:[EJ1.1] 1[EJ1.2]  Anxiety:[EJ1.1] 0[EJ1.2]  Drowsiness:[EJ1.1] 1[EJ1.2]  Poor Appetite:[EJ1.1] 1[EJ1.2]  Shortness of Breath:[EJ1.1] 1 sometimes has off/on dyspnea[EJ1.2]  Insomnia:[EJ1.1] 2[EJ1.2]  Delirium:[EJ1.1] 0[EJ1.2]  Other:[EJ1.1] 0[EJ1.2]  Overall (0 good/no concerns, 3 " very poor):[EJ1.1] 2[EJ1.2]    Past Medical History[EJ1.1]    Cardiac arrest and cardiogenic shock related to Afib/ Vfib arrest   Rheumatoid arthritis  LUE wound  LE lyphedema  Saddle anesthesia  New dx lymphoma[EJ1.2]      Past Surgical History       Social History[EJ1.1]   , no children. Sister is strong support and lives near her. 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 and appreciates  visits.[EJ1.2]     Family History[EJ1.1]   Family history reviewed with patient and is noncontributory.[EJ1.2]    Medications   I have reviewed this patient's medication profile and medications given in the past 24 hours.[EJ1.1]    Symptom medications of note:  Gabapentin 200mg TID  Morphine gel TID to skin  Prn tylenol  Prn melatonin  Prn morphine gel to wound  Prn zofran  Prn compazine   Prn tramadol 25mg q 6 hours prn[EJ1.2]      Physical Exam   Vital Signs: Temp: 98.4  F (36.9  C) Temp src: Oral BP: 124/64 Pulse: 62 Heart Rate: 59 Resp: 18 SpO2: 97 % O2 Device: None (Room air)    Weight: 157 lbs 12.8 oz    Physical Exam:[EJ1.1]  Gen: sitting/lying in bed. Appears comfortable   HEENT: NCAT. Conjunctiva clear. Sclera anicteric .  CV: RRR , Peripheral perfusion intact.   Resp: unlabored work of breathing, CTAB  Abd: soft, nt, nd, +BS   Msk: no gross deformity,   Skin:  no jaundice, LUE wound with bandage covering  Ext: warm, well perfused.   Neuro: face symmetric. EOM, vision, hearing grossly intact. Speech fluent. Moves all extremities   Mental status/Psych: alert. Oriented. Asks/answers questions appropriately. Affect is engaged, smiling[EJ1.2]      Data   Data reviewed:[EJ1.1]   labs and imaging reviewed in epic  Creatinine 0.74, GFR>90    ROUTINE ICU LABS (Last four results)  CMP[EJ1.2]  Recent Labs  Lab 07/20/17  0741 07/19/17  0721 07/18/17  0753 07/17/17  0706  07/16/17  0734 07/15/17  0803 07/14/17  0746    136 138 139  --  138 136 136   POTASSIUM 4.0 3.8 3.6 4.0  < > 4.1 4.4 4.3    CHLORIDE 100 101 103 104  --  104 105 101   CO2 27 26 26 27  --  26 24 26   ANIONGAP 8 10 8 8  --  8 8 9   GLC 92 98 90 80  --  85 100* 92   BUN 28 30 30 32*  --  37* 42* 40*   CR 0.74 0.71 0.67 0.70  --  0.85 0.99 1.09*   GFRESTIMATED 81 85 >90Non  GFR Calc 86  --  69 58* 52*   GFRESTBLACK >90African American GFR Calc >90African American GFR Calc >90African American GFR Calc >90African American GFR Calc  --  84 70 63   VIKTORIYA 8.6 8.7 8.9 9.1  --  9.1 8.5 8.6   MAG  --  2.0 1.8  --   --   --   --   --    PROTTOTAL  --   --   --   --   --  5.9* 5.3*  --    ALBUMIN  --   --   --   --   --  3.8 2.5* 2.5*   BILITOTAL  --   --   --   --   --  1.7* 0.9  --    ALKPHOS  --   --   --   --   --  127 116  --    AST  --   --   --   --   --  56* 41  --    ALT  --   --   --   --   --  37 34  --    < > = values in this interval not displayed.[EJ1.3]  CBC[EJ1.2]  Recent Labs  Lab 07/20/17  0741 07/19/17  0721 07/18/17  1944 07/18/17  0753 07/17/17  0706   WBC 3.0* 2.6*  --  2.0* 2.2*   RBC 2.88* 2.74*  --  2.70* 2.94*   HGB 9.0* 8.6*  --  8.4* 8.8*   HCT 27.6* 26.2* 27.5* 25.7* 27.9*   MCV 96 96  --  95 95   MCH 31.3 31.4  --  31.1 29.9   MCHC 32.6 32.8  --  32.7 31.5   RDW 27.8* 27.7*  --  27.6* 27.2*   PLT 31* 29*  --  31* 34*[EJ1.3]     INR[EJ1.2]  Recent Labs  Lab 07/16/17  0734   INR 1.41*[EJ1.3]     Arterial Blood Gas[EJ1.2]No lab results found in last 7 days.[EJ1.3]      Betty Patrick  Pager: [phone]  [facility] Inpatient Team Consult pager [phone] (M-F 8-4:30)  After-hours Answering Service [phone]  Palliative Clinic: [phone]     Total time spent was 90 minutes,  >50% of time was spent counseling and/or coordination of care regarding symptoms, support.[EJ1.1]       Revision History        User Key Date/Time User Provider Type Action    > EJ1.3 7/20/2017  3:09 PM Betty Patrick MD Physician Sign     EJ1.2 7/20/2017  2:55 PM Betty Patrick MD Physician      EJ1.1  7/20/2017  2:27 PM Betty Patrick MD Physician             Consults by Selvin Shelley MD at 7/13/2017  9:26 PM     Author:  Selvin Shelley MD Service:  Dermatology Author Type:  Physician    Filed:  7/16/2017 11:06 PM Date of Service:  7/13/2017  9:26 PM Creation Time:  7/13/2017  9:26 PM    Status:  Signed :  Selvin Shelley MD (Physician)     Consult Orders:    1. Dermatology IP Consult: Patient to be seen: Routine - within 24 hours; Posterior scalp lesion - 0.75 cm, pigmented, irregular borders, enlarging over 6 months. Please assess for melanoma.; Consultant may enter orders: Yes [[id number]] ordered by Kelly Smith MD at 07/13/17 0716                [facility] Inpatient Consult Dermatology Note    Impression/Plan:  56 year old female with history of RA, afib/flutter and recent cardiac arrest with multiple complications admitted with left arm wound and cellulitis, who is seen in consultation for a concerning lesion on the left occipital scalp.  1. Traumatic erosion with secondary crust.    Although grossly concerning for a pigmented lesion, further examination reveals the dark papule is in fact crust overlying a thin, healing erosion. Appears to be traumatic, potentially 2/2 her recent EEG's, etc. Cannot r/u pressure ischemia induced erosion with overlying crust with arrest/treatment. Appears to have only been present for the last week or so when questioned closely. At this point, recommend full removal of crust to allow for healing. If the lesion is still present in 3-4 weeks, please consult outpatient derm and we will further evaluate in outpatient setting. Very low concern for underlying NMSC. No concern for melanocytic lesions like melanoma.   -Apply saline soaked gauze for 20 minutes with nursing. Remove crust completely. Cover with vaseline BID until healed.     2. Punched out erosion on the LUE 2/2 extravasation with possible secondary infection. At this  point, lack of pain or worsening with debridement (no pathergy) makes traumatic/ infectious etiologies most likely (as previously suggested), however, if she develops additional lesions at sites of trauma, or the site begins to worsen in the context of debridement, Pyoderma Gangrenosum could be considered; Particularly in light of her rheumatologic hx and ongoing/unexplained fevers etc. We do not feel the patient has evidence of PG today; however, if the presentation changes significantly, please re-consult with concerns.       Thank you for the dermatology consultation. Please do not hesitate to contact the dermatology resident/faculty on call for any additional questions or concerns. Dermatology will sign off.     Frank Mcgill  Dermatology Resident[AD1.1]    I have seen and examined this patient and agree with the assessment and plan as documented in the resident's note.    Selvin Shelley MD  Dermatology Attending[DM1.1]        Date of Admission: Jun 19, 2017   Encounter Date: 7/13/2017     Reason for Consultation:   Scalp lesion    History of Present Illness:  Ms. Amelia Michel is a 56 year old female with history of RA, afib/flutter and recent cardiac arrest with multiple complications admitted with left arm wound and cellulitis 2/2 IV extravasation, who is seen in consultation for a concerning lesion on the left occipital scalp. Patient initially states she has felt something on the scalp for 6 months; however, later when pressed, she notes that she has only known about the lesion for 1 week. She could not confirm it was present prior to this. It is not painful, bleeding, pruritic or otherwise. No personal or family hx of skin malignancy.     Ongoing waxing and waning fevers of unclear etiology. BM bx pending at this point. Hx of RA on mtx, pred and plaquenil. Ulceration of the right arm s/p IV exravasation. Frequently debrided. No ulcers elsewhere. WOC on board. Appears to be improving without evidence  of pathergy to date.        Past Medical History:   Patient Active Problem List   Diagnosis     HTN (hypertension)     Primary pulmonary hypertension (H)     Hyperlipidemia LDL goal <130     Other rheumatoid arthritis with rheumatoid factor of multiple sites (H)     Lymphedema of both lower extremities     Atrial tachycardia (H)     Cardiogenic shock (H)     Acute respiratory failure with hypoxia (H)     Hypertension     Critical illness myopathy     Sepsis (H)     Wound of left upper extremity     Past Medical History:   Diagnosis Date     Hypertension      Rheumatoid arthritis(714.0)      Past Surgical History:   Procedure Laterality Date     ANESTHESIA CARDIOVERSION N/A 5/17/2017    Procedure: ANESTHESIA CARDIOVERSION;  ANESTHESIA CARDIOVERSION;  Surgeon: GENERIC ANESTHESIA PROVIDER;  Location: [facility] OR     ARTHRODESIS WRIST  2000    Right wrist     BONE MARROW ASPIRATE &BIOPSY  7/12/2017          FOOT SURGERY      4 left and 2 right     RELEASE CARPAL TUNNEL BILATERAL       REPAIR VENTRICULAR SEPTAL DEFECT  1969[AD1.1]         Social History     Social History     Marital status:      Spouse name: Romeo Michel     Number of children: 0     Years of education: N/A     Occupational History      [employer]     Social History Main Topics     Smoking status: Never Smoker     Smokeless tobacco: Never Used     Alcohol use Yes      Comment: Glass of wine two evenings a night     Drug use: No     Sexual activity: Not on file     Other Topics Concern     Parent/Sibling W/ Cabg, Mi Or Angioplasty Before 65f 55m? No     Social History Narrative[AD1.2]       Family History:  No skin cancer.     Medications:  Current Facility-Administered Medications   Medication     sodium chloride (PF) 0.9% PF flush 3 mL     sodium chloride (PF) 0.9% PF flush 3 mL     May take oral meds with a sip of water, the morning of BRE procedure.     HOLD: Insulin - REGULAR AM of procedure IF diabetic     HOLD: Insulin -  RAPID/SHORT acting AM of procedure IF diabetic     HOLD: Oral hypoglycemics AM of procedure IF diabetic     Give   of usual dose of LONG ACTING insulin AM of procedure IF diabetic     furosemide (LASIX) injection 40 mg     midodrine (PROAMATINE) tablet 10 mg     atenolol (TENORMIN) tablet 50 mg     naproxen (NAPROSYN) tablet 250 mg     acetaminophen (TYLENOL) tablet 650 mg     potassium chloride SA (K-DUR/KLOR-CON M) CR tablet 40 mEq     miconazole (MICATIN; MICRO GUARD) 2 % powder     predniSONE (DELTASONE) tablet 7.5 mg     calcium carbonate (TUMS) chewable tablet 500 mg     pantoprazole (PROTONIX) EC tablet 40 mg     digoxin (LANOXIN) tablet 125 mcg     ondansetron (ZOFRAN) tablet 4 mg     prochlorperazine (COMPAZINE) injection 5-10 mg     ondansetron (ZOFRAN) injection 4 mg     simethicone (MYLICON) chewable tablet 80 mg     sodium chloride (PF) 0.9% PF flush 10-20 mL     sodium chloride (PF) 0.9% PF flush 10 mL     heparin lock flush 10 UNIT/ML injection 5-10 mL     dextrose 10 % 1,000 mL infusion     gabapentin (NEURONTIN) capsule 200 mg     melatonin tablet 3 mg     morphine 0.1% in solosite topical gel 2 g     multivitamin, therapeutic with minerals (THERA-VIT-M) tablet 1 tablet     triamcinolone (KENALOG) 0.1 % ointment     lidocaine 1 % 1 mL     lidocaine (LMX4) kit     sodium chloride (PF) 0.9% PF flush 3 mL     sodium chloride (PF) 0.9% PF flush 3 mL     medication instruction     acetaminophen (TYLENOL) Suppository 650 mg     Patient is already receiving anticoagulation with heparin, enoxaparin (LOVENOX), warfarin (COUMADIN)  or other anticoagulant medication     glucose 40 % gel 15-30 g    Or     dextrose 50 % injection 25-50 mL    Or     glucagon injection 1 mg     naloxone (NARCAN) injection 0.1-0.4 mg     calcium-vitamin D (CALTRATE) 600-400 MG-UNIT per tablet 2 tablet     morphine 0.1% in solosite topical gel     potassium chloride SA (K-DUR/KLOR-CON M) CR tablet 20-40 mEq     potassium chloride  "(KLOR-CON) Packet 20-40 mEq     potassium chloride 10 mEq in 100 mL intermittent infusion     potassium chloride 10 mEq in 100 mL intermittent infusion with 10 mg lidocaine     potassium chloride 20 mEq in 50 mL intermittent infusion     magnesium sulfate 2 g in NS intermittent infusion (PharMEDium or FV Cmpd)     magnesium sulfate 4 g in 100 mL sterile water (premade)     sodium phosphate 10 mmol in D5W intermittent infusion     sodium phosphate 15 mmol in D5W intermittent infusion     sodium phosphate 20 mmol in D5W intermittent infusion     sodium phosphate 25 mmol in D5W intermittent infusion     melatonin tablet 1 mg     traMADol (ULTRAM) half-tab 25 mg          Allergies   Allergen Reactions     No Clinical Screening - See Comments      Penicillin Allergy Skin Test not performed, see antimicrobial management team progress note 7/5/17.       Penicillins      Tape [Adhesive Tape] Rash         Review of Systems:  -A ten point review of systems was performed and negative except as per HPI.     Physical exam:  Vitals: BP 92/62 (BP Location: Right arm)  Pulse 117  Temp 97.6  F (36.4  C) (Oral)  Resp 18  Ht 1.48 m (4' 10.25\")  Wt 75.1 kg (165 lb 9.6 oz)  SpO2 92%  BMI 34.31 kg/m2  GEN: This is a well developed, well-nourished female in no acute distress, in a pleasant mood.    SKIN: Focused examination of thescalp, head, neck, upper chest, abdomen, arms and back was performed.  -Over the left occipital scalp we find a well demarcated 7 mm dark brown stuck adherent crust overlying a thin, atrophic macule with healing fibrinous base. Hear stuck within the overlying crust; removed with ease.   -No other lesions of concern on areas examined.     Laboratory:  Results for orders placed or performed during the hospital encounter of 06/19/17 (from the past 24 hour(s))   Cortisol   Result Value Ref Range    Cortisol Serum 9.9 4 - 22 ug/dL   Basic metabolic panel   Result Value Ref Range    Sodium 137 133 - 144 mmol/L "    Potassium 4.0 3.4 - 5.3 mmol/L    Chloride 102 94 - 109 mmol/L    Carbon Dioxide 27 20 - 32 mmol/L    Anion Gap 8 3 - 14 mmol/L    Glucose 105 (H) 70 - 99 mg/dL    Urea Nitrogen 36 (H) 7 - 30 mg/dL    Creatinine 0.80 0.52 - 1.04 mg/dL    GFR Estimate 74 >60 mL/min/1.7m2    GFR Estimate If Black 90 >60 mL/min/1.7m2    Calcium 8.5 8.5 - 10.1 mg/dL   Magnesium   Result Value Ref Range    Magnesium 2.3 1.6 - 2.3 mg/dL   CT Chest w Contrast    Narrative    EXAMINATION: Chest CT  with IV contrast 7/13/2017     CLINICAL HISTORY: Patient with fevers of unknown origin.    COMPARISON: 7/1/2017, 5/29/2017.    TECHNIQUE: CT imaging obtained through the chest with IV contrast.  Coronal and axial MIP reformatted images obtained. Contrast: isovue  370.     FINDINGS:  Moderate bilateral pleural effusions. Associated atelectasis. No  pericardial effusion. Visualized thyroid gland is notable for  substernal extension of the parenchyma into the anterior mediastinum.  Heterogeneous attenuation without discrete nodule. Typical branching  pattern of the great vessels. Sternotomy wires. Mild atherosclerotic  calcifications of the thoracic aorta. Coarse calcifications of the  anterior mediastinal fat, just posterior to the sternum. No associated  soft tissue mass. Improving generalized anasarca. Mildly enlarged  mediastinal and paratracheal lymph nodes. For example, the right lower  paratracheal lymph node (series 2, image 18) measures 1.7 cm in short  axis diameter. No    No perihilar or axillary lymphadenopathy. Interlobular septal  thickening and scattered groundglass opacities, particularly involving  the lingula. Tree-in-bud opacities of the right lung. Consolidative  opacities involving the right lower lobe.    Mild to moderate cardiomegaly. Central tracheobronchial tree is  patent. No pneumothorax.    Bones and soft tissues: Subacute right anterolateral second through  sixth rib fractures. Subacute left third through fifth  anterior rib  fractures. Mild degenerative changes of spine, including anterior  osteophytes and intervertebral vacuum disc phenomenon.    Partially imaged upper abdomen: Limited. Evolving infarcts of the  spleen. Mild generalized hepatic steatosis without focal mass or  cirrhosis.      Impression    IMPRESSION:   1. Right lung tree-in-bud opacities with more consolidative opacities  in the right lower lobe, concerning for pneumonia. Presumably reactive  paratracheal lymphadenopathy.  2. Cardiomegaly and interlobular septal thickening, the latter likely  reflecting mild pulmonary edema.  3. Bilateral pleural effusions. Improving generalized anasarca.  4. Evolving splenic infarcts.  5. Multiple bilateral subacute rib fractures. No pneumothorax.  6. 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   Basic metabolic panel   Result Value Ref Range    Sodium 135 133 - 144 mmol/L    Potassium 4.7 3.4 - 5.3 mmol/L    Chloride 101 94 - 109 mmol/L    Carbon Dioxide 26 20 - 32 mmol/L    Anion Gap 9 3 - 14 mmol/L    Glucose 143 (H) 70 - 99 mg/dL    Urea Nitrogen 37 (H) 7 - 30 mg/dL    Creatinine 0.97 0.52 - 1.04 mg/dL    GFR Estimate 59 (L) >60 mL/min/1.7m2    GFR Estimate If Black 71 >60 mL/min/1.7m2    Calcium 8.1 (L) 8.5 - 10.1 mg/dL   CBC with platelets differential   Result Value Ref Range    WBC 2.9 (L) 4.0 - 11.0 10e9/L    RBC Count 2.64 (L) 3.8 - 5.2 10e12/L    Hemoglobin 8.0 (L) 11.7 - 15.7 g/dL    Hematocrit 24.9 (L) 35.0 - 47.0 %    MCV 94 78 - 100 fl    MCH 30.3 26.5 - 33.0 pg    MCHC 32.1 31.5 - 36.5 g/dL    RDW 24.9 (H) 10.0 - 15.0 %    Platelet Count 45 (LL) 150 - 450 10e9/L    Diff Method Pending        Dr. Selvin Shelley staffed the patient.    Staff Involved:  Resident(Frank Mcgill)/Staff(as above)    Frank Mcgill MD  PGY3 Dermatology[AD1.1]               Revision History        User Key Date/Time User Provider Type Action    > DM1.1 7/16/2017 11:06 PM Selvin Shelley MD Physician Sign     AD1.2  7/13/2017  9:44 PM Frank Mcgill MD Resident Sign     AD1.1 7/13/2017  9:26 PM Frank Mcgill MD Resident             Consults by Melquiades Lazar MD at 7/13/2017  7:47 AM     Author:  Melquiades Lazar MD Service:  Rheumatology Author Type:  Physician    Filed:  7/15/2017 10:54 AM Date of Service:  7/13/2017  7:47 AM Creation Time:  7/13/2017  7:46 AM    Status:  Signed :  Melquiades Lazar MD (Physician)     Consult Orders:    1. Rheumatology IP Consult: Patient to be seen: Routine - within 24 hours; Patient with known RA and daily fevers -- question for RA or adult still's as etiology of fevers?; Consultant may enter orders: Yes [[id number]] ordered by Kelly Smith MD at 07/12/17 [facility]                [facility] Rheumatology Consultation    Amelia Michel MRN# [id number]   Age: 56 year old YOB: 1960     Date of Admission:  6/19/2017    Reason for consult: RA with daily fevers, question is whether RA or new diagnosis (?AOSD) as the etiology.        Requesting physician: Dr. Kelly Smith                   Impression and Recommendation:   Impression: Amelia Michel is a 56 year old female with history of[EM1.1] RA[EM1.2], afib/flutter and recent cardiac arrest with multiple complications admitted with left arm wound and cellulitis who has now been experiencing almost daily fevers despite tylenol use. Rheumatology consulted for possible autoimmune etiology given that ID does not suspect ongoing infection. Based on clinical history and exam, she does not have active rheumatoid arthritis at this time. Fevers to this magnitude are rare even in active disease. An alternative autoimmune process is also unlikely, including Adult-onset Stills disease (AOSD). While she did have an isolated rash during her prior hospitalization, thought to be drug reaction, she has not had any rashes with her current fevers. Felty's syndrome is seen usually in seropositive severe  erosive RA and manifests with rheumatoid arthritis, neutropenia, and splenomegaly. This does not appear to be what this patient has either.    While it is unclear where exactly her fevers are originating from, we do not suspect autoimmune to be the etiology. Would favor hematologic etiology versus infectious, though infectious seems less likely as well given continued fevers despite adequate broad spectrum coverage (unless there is a problem with source control). We will await bone marrow biopsy results as this may yield an answer for her fevers of unknown origin. No changes to her RA medications are required at this time.[EM1.3]     Problem list:  1. Seropositive RA (+RF, CCP) without active disease  2. +CELESTE (weakly positive, 1:40)  3. Pancytopenia   4. Fever of unknown origin[EM1.4]    Recommendations:[EM1.1]  -- Unlikely that fevers are due to autoimmune process[EM1.5]  --[EM1.1] Follow up on bone marrow biopsy results[EM1.5]  --[EM1.1] Continue prednisone 7.5 mg daily; can hold MTX, HCQ until follow up.  --  to bring in prior rheumatologic records with prior surveillance labs[EM1.5]  --[EM1.1] May need to reschedule follow up with Dr. Cunha depending on when patient discharges from hospital.   -- Consider discussing case with ID again[EM1.5]   -- Rheumatology will continue to follow along.[EM1.3]    The patient was seen and staffed with [EM1.1] MD Nagi[EM1.3].[EM1.1]     ATTENDING TIE IN NOTE:    I saw the patient with the resident and Rheumatology Fellow.  My exam and recommendations are as described in this edited note.      Melquiades Lazar MD FACP    Rheumatic and Autoimmune Diseases[GB1.1]         Chief Complaint:[EM1.1]   Fevers  History is obtained from the patient[EM1.2]         History of Present Illness:   This patient is a 56 year old[EM1.1] [EM1.2] female with a significant past medical history of[EM1.1] seropositive RA (CCP), afib/flutter, VSD repair in  kenji[EM1.2] andrea[EM1.1] was admitted to hospital after out of hospital cardiac arrest in May 2017. ROSC was obtained in field. Etiology behind arrest remains unclear, she had clean coronary arteries on angiogram. She had prolonged hospitalization from 5/29 to 6/15 complicated by bone marrow suppression, hypoxic respiratory failure requiring mechanical ventilation, CELSA, anoxic brain injury, ESBL UTI, and aspiration PNA. She also had extravasation of amiodarone during arrest that caused rash of left antecubital fossa which did not allow implantation of ICD at the time. She was discharged to a rehab facility only to return on 6/18 with fevers, left upper extremity wound, concerning for cellulitis and/or myositis. Wound grew enterobacter, CoNS, and enterococcus. She was started on bactrim, then switched to ertapenem. She also had courses of meropenem and vancomycin during this time.[EM1.2]     Since admission she has continued to have spiking fevers to 100-102F despite antibiotic therapy. Per patient, she is feeling well other than these continued fevers. She reports that since everything occurred surrounding her cardiac arrest and its complications, she has been gradually improving. She has daily fevers but no associated rashes, arthritis/arthralgias, sore throat. She does report myalgias after working with PT. ROS was unremarkable except for a unknown lesion on her scalp, dry mouth, leg edema (chronic), and the left arm wound.     Regarding her RA, this has been followed by Dr. Dumas in [place], [place] for many years. She has not had active disease for a period of years and she has had routine surveillance of her RA with labs every 3 months since she can remember. She denies ever being told that she had toxicity from her medications. Prior to her cardiac arrest, she was on methotrexate 10 mg PO/week, Arava 10 mg daily,  mg daily, and prednisone 3 mg daily. She has history of positive RF in 2016, negative on  subsequent check in 2017 and recently had positive CCP. Previously also had mildly positive CELESTE (1:40).[EM1.3]          Past Medical History:[EM1.1]     Past Medical History:   Diagnosis Date     Hypertension      Rheumatoid arthritis(714.0)[EM1.6]           Past Surgical History:[EM1.1]     Past Surgical History:   Procedure Laterality Date     ANESTHESIA CARDIOVERSION N/A 5/17/2017    Procedure: ANESTHESIA CARDIOVERSION;  ANESTHESIA CARDIOVERSION;  Surgeon: GENERIC ANESTHESIA PROVIDER;  Location: [facility] OR     ARTHRODESIS WRIST  2000    Right wrist     BONE MARROW ASPIRATE &BIOPSY  7/12/2017          FOOT SURGERY      4 left and 2 right     RELEASE CARPAL TUNNEL BILATERAL       REPAIR VENTRICULAR SEPTAL DEFECT  1969[EM1.6]            Social History:[EM1.1]   , no kids. No tobacco, drugs. Occasional wine.[EM1.2]           Family History:[EM1.1]   Hx of breast cancer, lymphoma, HTN.[EM1.2]           Immunizations:[EM1.1]     Most Recent Immunizations   Administered Date(s) Administered     Pneumococcal 23 valent 08/31/2011     TDAP Vaccine (Adacel) 02/08/2011[EM1.6]             Allergies:[EM1.1]     Allergies   Allergen Reactions     No Clinical Screening - See Comments      Penicillin Allergy Skin Test not performed, see antimicrobial management team progress note 7/5/17.       Penicillins      Tape [Adhesive Tape] Rash[EM1.6]             Medications:[EM1.1]     Prescriptions Prior to Admission   Medication Sig Dispense Refill Last Dose     ertapenem (INVANZ) 1 GM vial Inject 1 g into the vein every 24 hours 10 each  Unknown at Unknown time     insulin aspart (NOVOLOG PEN) 100 UNIT/ML injection Inject 1-7 Units Subcutaneous 3 times daily (before meals)   Unknown at Unknown time     insulin aspart (NOVOLOG PEN) 100 UNIT/ML injection Inject 1-5 Units Subcutaneous At Bedtime   Unknown at Unknown time     melatonin 3 MG tablet Take 1 tablet (3 mg) by mouth nightly as needed for sleep   Unknown at Unknown time      melatonin 1 MG TABS tablet Take 1 tablet (1 mg) by mouth At Bedtime   Unknown at Unknown time     morphine 0.1% in solosite topical gel Place 2 g onto the skin 3 times daily  0 Unknown at Unknown time     multivitamin, therapeutic with minerals (THERA-VIT-M) TABS tablet Take 1 tablet by mouth daily 30 each 0 Unknown at Unknown time     [START ON 7/15/2017] predniSONE (DELTASONE) 5 MG tablet Take 1 tablet (5 mg) by mouth daily   Unknown at Unknown time     predniSONE (DELTASONE) 10 MG tablet Take 1 tablet (10 mg) by mouth daily (Patient taking differently: Take 10 mg by mouth daily Take 10mg daily, last dose 6/28)   Unknown at Unknown time     predniSONE (DELTASONE) 2.5 MG tablet Take 3 tablets (7.5 mg) by mouth daily (Patient taking differently: Take 7.5 mg by mouth daily Start 6/29 for 2 weeks)   Unknown at Unknown time     vancomycin 1,500 mg Inject 1,500 mg into the vein every 12 hours   Unknown at Unknown time     zinc sulfate (ZINCATE) 220 (50 ZN) MG capsule Take 1 capsule (220 mg) by mouth daily   Unknown at Unknown time     ascorbic acid 500 MG TABS Take 1 tablet (500 mg) by mouth daily 30 tablet  Unknown at Unknown time     beta carotene 24619 UNIT capsule Take 1 capsule (25,000 Units) by mouth daily   Unknown at Unknown time     acetaminophen (TYLENOL) 325 MG tablet Take 2 tablets (650 mg) by mouth every 4 hours as needed for mild pain 100 tablet  Unknown at Unknown time     apixaban ANTICOAGULANT (ELIQUIS) 2.5 MG tablet Take 1 tablet (2.5 mg) by mouth 2 times daily   Unknown at Unknown time     cetirizine (ZYRTEC) 10 MG tablet Take 1 tablet (10 mg) by mouth daily 30 tablet  Unknown at Unknown time     atenolol (TENORMIN) 50 MG tablet Take 1 tablet (50 mg) by mouth daily 30 tablet  Unknown at Unknown time     digoxin (LANOXIN) 250 MCG tablet Take 1 tablet (250 mcg) by mouth daily 30 tablet  Unknown at Unknown time     triamcinolone (KENALOG) 0.1 % ointment Apply topically 2 times daily   Unknown at Unknown  time     senna-docusate (SENOKOT-S;PERICOLACE) 8.6-50 MG per tablet Take 1-2 tablets by mouth 2 times daily 100 tablet  Unknown at Unknown time     Calcium Carb-Cholecalciferol 600-800 MG-UNIT TABS Take 2 tablets by mouth every morning   Unknown at Unknown time     Coenzyme Q10 (COQ-10 PO) Take 1 tablet by mouth daily In the morning   Unknown at Unknown time     gabapentin (NEURONTIN) 100 MG capsule Take 2 capsules (200 mg) by mouth 3 times daily (Patient taking differently: Take 200 mg by mouth Take 2 capsules (200 mg) by mouth every 6 hours (4 am, 10am, 4pm, 10pm)) 180 capsule 3 Unknown at Unknown time     folic acid (FOLVITE) 1 MG tablet Take 1 mg by mouth daily.   Unknown at Unknown time       Current Facility-Administered Medications   Medication     furosemide (LASIX) injection 40 mg     diltiazem (CARDIZEM) tablet 30 mg     midodrine (PROAMATINE) tablet 10 mg     naproxen (NAPROSYN) tablet 250 mg     acetaminophen (TYLENOL) tablet 650 mg     potassium chloride SA (K-DUR/KLOR-CON M) CR tablet 40 mEq     miconazole (MICATIN; MICRO GUARD) 2 % powder     predniSONE (DELTASONE) tablet 7.5 mg     calcium carbonate (TUMS) chewable tablet 500 mg     pantoprazole (PROTONIX) EC tablet 40 mg     digoxin (LANOXIN) tablet 125 mcg     ondansetron (ZOFRAN) tablet 4 mg     prochlorperazine (COMPAZINE) injection 5-10 mg     ondansetron (ZOFRAN) injection 4 mg     simethicone (MYLICON) chewable tablet 80 mg     sodium chloride (PF) 0.9% PF flush 10-20 mL     sodium chloride (PF) 0.9% PF flush 10 mL     heparin lock flush 10 UNIT/ML injection 5-10 mL     dextrose 10 % 1,000 mL infusion     gabapentin (NEURONTIN) capsule 200 mg     melatonin tablet 3 mg     morphine 0.1% in solosite topical gel 2 g     multivitamin, therapeutic with minerals (THERA-VIT-M) tablet 1 tablet     triamcinolone (KENALOG) 0.1 % ointment     lidocaine 1 % 1 mL     lidocaine (LMX4) kit     sodium chloride (PF) 0.9% PF flush 3 mL     sodium chloride (PF)  "0.9% PF flush 3 mL     medication instruction     acetaminophen (TYLENOL) Suppository 650 mg     Patient is already receiving anticoagulation with heparin, enoxaparin (LOVENOX), warfarin (COUMADIN)  or other anticoagulant medication     glucose 40 % gel 15-30 g    Or     dextrose 50 % injection 25-50 mL    Or     glucagon injection 1 mg     naloxone (NARCAN) injection 0.1-0.4 mg     calcium-vitamin D (CALTRATE) 600-400 MG-UNIT per tablet 2 tablet     morphine 0.1% in solosite topical gel     potassium chloride SA (K-DUR/KLOR-CON M) CR tablet 20-40 mEq     potassium chloride (KLOR-CON) Packet 20-40 mEq     potassium chloride 10 mEq in 100 mL intermittent infusion     potassium chloride 10 mEq in 100 mL intermittent infusion with 10 mg lidocaine     potassium chloride 20 mEq in 50 mL intermittent infusion     magnesium sulfate 2 g in NS intermittent infusion ([facility]ar[facility] or [facility] Cmpd)     magnesium sulfate 4 g in 100 mL sterile water (premade)     sodium phosphate 10 mmol in D5W intermittent infusion     sodium phosphate 15 mmol in D5W intermittent infusion     sodium phosphate 20 mmol in D5W intermittent infusion     sodium phosphate 25 mmol in D5W intermittent infusion     melatonin tablet 1 mg     traMADol (ULTRAM) half-tab 25 mg[EM1.6]            Review of Systems:   Complete 10 point ROS completed and negative unless mentioned in HPI.          Physical Exam:   Vitals were reviewed[EM1.1]  BP 95/66 (BP Location: Right arm)  Pulse 68  Temp 97.8  F (36.6  C) (Oral)  Resp 20  Ht 1.48 m (4' 10.25\")  Wt 75.1 kg (165 lb 9.6 oz)  SpO2 100%  BMI 34.31 kg/m2[EM1.6]    General:[EM1.1] appears well, no acute distress,  at bedside[EM1.2]  HEENT:[EM1.1] NC/AT. EOMI, white sclera, pupils equal, op clear.[EM1.2]   Lymph:[EM1.1] no cervical lymphadenopathy[EM1.2]  CV:[EM1.1] irregularly irregular rhythm with normal rate, no m/r/g.[EM1.2]   Lung:[EM1.1] ctab, normal effort[EM1.2]  Abdomen:[EM1.1] soft, nontender, " nondistended, bowel sounds present[EM1.2]  Neuro:[EM1.1] AOx3. Strength 5/5 x4. Normal speech.[EM1.2]   Skin/Hair:[EM1.1] dime sized dark brown lesion on posterior left scalp. No rashes seen on chest, arms, back.[EM1.2]  Ext:[EM1.1] wwp, lower extremities with thigh high compression stockings, 1+ pitting edema. Left upper extremity wound in antecubital fossa with dressing in place.[EM1.2]   Msk:[EM1.1] Right wrist fused, limited extension/flexion ROM but no TTP. Left wrist with mostly normal ROM. Mild TTP of 1st-3rd MCPs, PIPs on left hand without active synovitis. Ulnar deviation present bilaterally, left worse than right. Evidence of synovial thickening in most MCP joints bilaterally. Examined bilateral shoulders, elbows, knees, ankles - all joints with normal appearance, no TTP, warmth, erythema and had normal ROM.[EM1.2]           Data:   Labs and imaging reviewed. Pertinent findings below.[EM1.1]  CBC with pancytopenia (WBC 2.1, ANC 1200, Hgb 8.9, PLT 51)  BMP unremarkable\  CRP 95  Lactate 3.2    Prior autoimmune workup:  CELESTE 1:40, positive CCP, negative RF  Negative SSA/SSB, anti-smith, dsDNA, scl-70, RNP, C3/C4 (normal), LAC, and B2 glycoproteins    CT scan of abdomen/pelvis 7/1/17  - possible splenic infarct  - anasarca, bilateral pleural effusions  - no hepatosplenomegaly[EM1.2]    Ruben Shelley MD[EM1.6]  Internal medicine PGY-3  Pager [phone][EM1.1]            Revision History        User Key Date/Time User Provider Type Action    > GB1.1 7/15/2017 10:54 AM Melquiades Lazar MD Physician Sign     [N/A] 7/13/2017  6:20 PM Ruben Shelley MD Resident Sign     EM1.4 7/13/2017  6:19 PM Ruben Shelley MD Resident Sign     EM1.3 7/13/2017  6:05 PM Ruben Shelley MD Resident      EM1.5 7/13/2017 11:48 AM Ruben Shelley MD Resident      EM1.6 7/13/2017 11:37 AM Ruben Shelley MD Resident      EM1.2 7/13/2017 11:31 AM Ruben Shelley MD Resident      EM1.1 7/13/2017  7:46 AM Ruben Shelley MD Resident              Consults by Romeo Chow MD at 7/14/2017  5:27 PM     Author:  Romeo Chow MD Service:  Infectious Disease Author Type:  Physician    Filed:  7/14/2017  6:18 PM Date of Service:  7/14/2017  5:27 PM Creation Time:  7/14/2017  4:32 PM    Status:  Signed :  Romeo Chow MD (Physician)                  General Infectious Disease Service Consultation     Patient:  Amelia Michel   Date of birth 1960, Medical record number [id number]  Date of Visit:  07/14/2017  Date of Admission: 6/19/2017  Consult Requester:No att. providers found            Assessment and Recommendations:   ASSESSMENT:  1. Fever of unknown origin. Infectious workup including blood cultures (from 6/26/2017 to the most recent on 7/12/2017) have shown no growth to date. Enteric bacteria and virus panel by FABIOLA stool (7/2), parasite stain for Anaplasma and Babesia (7/7), A. phagocytophil IgG (7/7), M. Tuberculosis quantiferon (7/8), Lyme IgG (7/6), catheter tip cultures (7/8), c.diff (7/3) have all been negative. UA (7/10) was negative. At this point, an infectious source of fevers seems unlikely. While she does have a procalcitonin level of 2.33, she has remained afebrile since 7/12 while off any antibiotics and does not have any signs or symptoms of a systemic infection at this point clinically.   2. Possible pneumonia while[MS1.1] recently[RR1.1] on meropenem. Her most recent CT chest (7/13) did reveal a right lung tree-in-bud opacities with more consolidative opacities in RLL concerning for pneumonia. However, she has remained afebrile since 7/12 and does not have any complaints of sore throat, cough, chest pain, shortness of breath or a positive lung exam that would indicate that she currently has a pneumonia.   3. Vegetations on BRE. This meets one of the major [facility] criteria for endocarditis. IE antibiotic treatment would be indicated in patients with[MS1.1] 2[RR1.1] major[MS1.1] criteria,[RR1.1] 1 minor  criteria[MS1.1] +[RR1.1] 3 minor criteria[MS1.1] or 5 minor criteria[RR1.1].[MS1.1] Currently she meets 1 major (vegetation on BRE) and possibly 1-2 minor criteria (fever and splenic infarct).[RR1.1] Hence, there is no indication for antibiotics at this time. Serology for Q fever and Bartonella would be helpful in this case since these are common sources of culture negative endocarditis.       RECOMMENDATION:  1. Agree with primary team's decision to discontinue antibiotics.   2. Obtain serology for Q fever and Bartonella as these are the most common causes of culture negative endocarditis.    3. If patient develops respiratory symptoms, we can consider IV Zosyn 3.375mg Q6H. However, if patient spikes another fever, we do not recommend starting antibiotics immediately in the absence of respiratory symptoms or a confirmed source of infection.[MS1.1]     Attestation:  This patient has been seen and evaluated by me,[RR1.1] Romeo Chow MD[RR1.2].  I discussed this patient with the fellow and/or resident(s) and agree with the findings and plan in this note. I also personally edited this note to reflect my findings. I have reviewed today's vital signs, medications, labs and imaging.   MEME Chow M.D.  [facility] Service Staff  [phone][RR1.1]     ________________________________________________________________    Consult Question: fever of unknown origin and concerns of possible pneumonia while on meropenem  Admission Diagnosis: Sepsis  Sepsis (H)  Wound of left upper extremity  Wound of left upper extremity         History of Present Illness:   Mrs. Michel is a pleasant 56-year-old woman with a history of RA, a.fib/flutter and recent cardiac arrest over a month ago with multiple complications admitted for left arm wound and cellulitis 2/2 IV extravasation and has been consulted for fevers of unknown origin and concerns for pneumonia while on Meropenem. She was discharged to ARU 3 weeks ago but was readmitted  with worsening LUE wound and fevers. Initial wound thought to be secondary to IV extravasation. The wound grew Enterobacter, Enterococcus and Coag negative Staph and she received broad spectrum antibiotics for this.  Not clear how much cellulitis vs chemical burn. She was subsequently switched to TMP-SMX after ID consultation.  The wound has been improving steadily. She then started developing fevers around 7/1/17.  The wound seems underwhelming as source of infection.  And without a clear source of infection, empiric antibiotics were stopped 7/6/2017. It is possible that previous broad spectrum antibiotics for wound were treating occult infection and this was 'uncovered' with deescalation of antibiotics but imaging did not reveal other source. Non-infectious causes of fever particularly with h/o RA and current pancytopenia seems likely. She has some baseline immunosuppression with chronic steroids and recent critical illness.     At this time, she has no other localizing sources of fever. She has remained hemodynamically stable. Moreover, infectious workup including blood cultures (from 6/26/2017 to the most recent on 7/12/2017) have shown no growth to date. Enteric bacteria and virus panel by FABIOLA stool (7/2), parasite stain for Anaplasma and Babesia (7/7), A. phagocytophil IgG (7/7), M. Tuberculosis quantiferon (7/8), Lyme IgG (7/6), catheter tip cultures (7/8), c.diff (7/3) have all been negative. UA (7/10) was negative. An infectious source of fevers seems unlikely at this time. While she does have a procalcitonin level of 2.33, she has remained afebrile since 7/12 while off any antibiotics and does not have any signs or symptoms of a systemic infection.      Given recent history of arrest, there has been a focus on hospital-acquired infections. At this time, there is no indication for antibiotics given her lack of cough, sore throat, chest pain or shortness of breath to indicate a possible pneumonia even in the  context of the most recent CT chest (7/13) revealing a right lung tree-in-bud opacities with more consolidative opacities in RLL concerning for pneumonia.     Vegetations were also noted on BRE today concerning for infective endocarditis. However, she only meets 1 major Duke criteria for IE. IE antibiotic treatment would be indicated in patients with patients with[MS1.1] 2[RR1.1] major[MS1.1] criteria,[RR1.1] 1 minor criteria[MS1.1] +[RR1.1] 3 minor criteria[MS1.1] or 5 minor criteria[RR1.1]. Hence, there is no indication for antibiotics at this time. Serology for Q fever and Bartonella would be helpful in this case since these are common sources of culture negative endocarditis. Some other organisms also noted in culture negative endocarditis include Chlamydia spp, Legionella spp, Mycoplasma spp, and Brucella.     At this point, we would continue to monitor her off antibiotics, which may also help from a diagnostic standpoint. Rheumatology and hematology have been consulted for evaluation of other sources of fevers.      Recent culture results include:  Culture Micro   Date Value Ref Range Status   07/12/2017 No growth after 2 days  Final   07/12/2017 No growth after 2 days  Final   07/10/2017 No growth after 4 days  Final   07/10/2017 No growth after 4 days  Final   07/08/2017 No growth  Final   07/08/2017 Culture negative monitoring continues  Final   07/07/2017 No growth  Final   07/07/2017 No growth  Final   07/06/2017 No growth  Final   07/06/2017 No growth  Final              Review of Systems:   CONSTITUTIONAL:  No fevers or chills  EYES: negative for icterus  ENT:  negative for hearing loss, tinnitus and sore throat  RESPIRATORY:  negative for cough with sputum and dyspnea  CARDIOVASCULAR:  negative for chest pain, dyspnea  GASTROINTESTINAL:  negative for nausea, vomiting, diarrhea and constipation  GENITOURINARY:  negative for dysuria  HEME:  No easy bruising  INTEGUMENT:  negative for rash and  pruritus  NEURO:  Negative for headache           Past Medical History:     Past Medical History:   Diagnosis Date     Hypertension      Rheumatoid arthritis(714.0)             Past Surgical History:     Past Surgical History:   Procedure Laterality Date     ANESTHESIA CARDIOVERSION N/A 5/17/2017    Procedure: ANESTHESIA CARDIOVERSION;  ANESTHESIA CARDIOVERSION;  Surgeon: GENERIC ANESTHESIA PROVIDER;  Location: [facility] OR     ARTHRODESIS WRIST  2000    Right wrist     BONE MARROW ASPIRATE &BIOPSY  7/12/2017          FOOT SURGERY      4 left and 2 right     RELEASE CARPAL TUNNEL BILATERAL       REPAIR VENTRICULAR SEPTAL DEFECT  1969            Family History:     Family History   Problem Relation Age of Onset     Breast Cancer Mother      Hypertension Mother      Alzheimer Disease Mother      Hypertension Father      Blood Disease Father      Lymphoa     Circulatory Father      A Fib     DIABETES Paternal Grandmother             Social History:     Social History   Substance Use Topics     Smoking status: Never Smoker     Smokeless tobacco: Never Used     Alcohol use Yes      Comment: Glass of wine two evenings a night     History   Sexual Activity     Sexual activity: Not on file            Current Medications (antimicrobials listed in bold):       midodrine  10 mg Oral TID w/meals     acetaminophen  650 mg Oral Q8H     miconazole   Topical BID     predniSONE  7.5 mg Oral Daily     calcium carbonate  500 mg Oral Daily     pantoprazole  40 mg Oral QAM     digoxin  125 mcg Oral Daily     sodium chloride (PF)  10 mL Intracatheter Q8H     gabapentin  200 mg Oral TID     morphine 0.1% in solosite  2 g Transdermal TID     multivitamin, therapeutic with minerals  1 tablet Oral Daily     calcium-vitamin D  2 tablet Oral Daily     melatonin  1 mg Oral At Bedtime            Allergies:     Allergies   Allergen Reactions     No Clinical Screening - See Comments      Penicillin Allergy Skin Test not performed, see antimicrobial  management team progress note 7/5/17.       Penicillins      Tape [Adhesive Tape] Rash            Physical Exam:   Vitals were reviewed  Patient Vitals for the past 24 hrs:   BP Temp Temp src Pulse Heart Rate Resp SpO2 Weight   07/14/17 1614 93/75 - - 108 - 16 94 % -   07/14/17 1611 104/66 - - 108 - 16 99 % -   07/14/17 1608 93/63 - - 109 - 16 98 % -   07/14/17 1605 (!) 89/63 - - 109 - 16 99 % -   07/14/17 1602 95/65 - - 110 - 14 99 % -   07/14/17 1559 97/57 - - 110 - 14 99 % -   07/14/17 1556 97/59 - - 112 - 14 99 % -   07/14/17 1553 92/60 - - 113 - 14 100 % -   07/14/17 1550 101/58 - - 111 - 16 100 % -   07/14/17 1547 97/62 - - 112 - 15 99 % -   07/14/17 1544 94/66 - - 113 - 18 99 % -   07/14/17 1541 95/67 - - 112 - 18 98 % -   07/14/17 1538 105/55 - - 113 - 18 98 % -   07/14/17 1519 100/64 - - 112 112 18 98 % -   07/14/17 1100 (!) 88/65 98.1  F (36.7  C) Oral - 119 18 94 % -   07/14/17 0857 91/59 - - - 119 18 - -   07/14/17 0800 (!) 86/49 98.1  F (36.7  C) Oral - 112 18 92 % -   07/14/17 0600 - - - - - - - 74.7 kg (164 lb 9.6 oz)   07/14/17 0546 (!) 89/57 97.7  F (36.5  C) Axillary - 113 18 - -   07/14/17 0158 (!) 80/63 - - - 98 18 - -   07/14/17 0001 (!) 85/48 98.3  F (36.8  C) Axillary - 113 18 91 % -   07/13/17 1942 92/62 97.6  F (36.4  C) Oral 117 116 18 92 % -     Ranges for his vital signs:  Temp:  [97.6  F (36.4  C)-98.3  F (36.8  C)] 98.1  F (36.7  C)  Pulse:  [108-117] 108  Heart Rate:  [] 112  Resp:  [14-18] 16  BP: ()/(48-75) 93/75  SpO2:  [91 %-100 %] 94 %    Physical Examination:  GENERAL:  well-developed, well-nourished, in bed in no acute distress.   HEENT:  Head is normocephalic, atraumatic   EYES:  Eyes have anicteric sclerae without conjunctival injection or stigmata of endocarditis.    ENT:  Oropharynx is moist without exudates or ulcers. Tongue is midline  NECK:  Supple. No  Cervical lymphadenopathy  LUNGS:  Clear to auscultation bilateral.   CARDIOVASCULAR:  Regular rate and  rhythm with no murmurs, gallops or rubs.  ABDOMEN:  Normal bowel sounds, soft, nontender. No appreciable hepatosplenomegaly  SKIN:  No acute rashes.  Line(s) are in place without any surrounding erythema or exudate. Left upper extremity erosion with yellow exudate covered in clean, dry dressing without surrounding erythema.  NEUROLOGIC:  Grossly nonfocal. Active x4 extremities         Laboratory Data:     Inflammatory Markers    Recent Labs   Lab Test  07/14/17   0746  07/12/17   0641  07/06/17   0704  07/02/17   0641  06/20/17   0709  06/19/17   1612  06/04/17   0408  06/03/17   0307   05/30/17   0347  05/29/17   1055   03/20/16   1738   SED   --    --    --    --    --   63*   --    --    --   34*  22   --   29   CRP  83.0*  95.0*  61.0*  69.0*  110.0*  98.0*  23.0*  29.0*   < >  110.0*  58.0*   < >  28.4*    < > = values in this interval not displayed.       Hematology Studies  Recent Labs   Lab Test  07/14/17   0746  07/13/17 2017 07/12/17   0641  07/10/17   0546  07/08/17   1308  07/07/17   0715  07/06/17   1318   WBC  2.2*  2.9*  2.1*  1.6*  2.6*  1.4*  1.7*   ANEU  1.3*  2.0  1.2*  0.9*  1.8   --   1.2*   AEOS  0.0  0.0  0.0  0.0  0.0   --   0.0   HGB  7.8*  8.0*  8.9*  7.5*  8.7*  7.4*  8.4*   MCV  95  94  94  94  93  93  93   PLT  39*  45*  51*  43*  45*  27*  34*       Immune Globulin Studies  No lab results found.    Metabolic Studies   Recent Labs   Lab Test  07/14/17   0746  07/13/17 2017 07/13/17   0744  07/12/17   0641  07/11/17   0824   NA  136  135  137  134  136   POTASSIUM  4.3  4.7  4.0  4.0  3.8   CHLORIDE  101  101  102  99  101   CO2  26  26  27  25  29   BUN  40*  37*  36*  28  24   CR  1.09*  0.97  0.80  0.67  0.66   GFRESTIMATED  52*  59*  74  >90  Non  GFR Calc    >90  Non  GFR Calc         Hepatic Studies  Recent Labs   Lab Test  07/14/17   0746  07/02/17   0641  06/28/17   0555  06/27/17   0826  06/19/17   0608  06/16/17   0609  06/15/17   0556   06/05/17   0354   BILITOTAL   --   0.9  0.8  0.6  0.7  0.8  0.8  1.7*   ALKPHOS   --    --   126  129  180*  159*  168*  149   ALBUMIN  2.5*   --   1.5*  1.4*  1.8*  1.8*  1.8*  1.9*   AST   --    --   41  54*  41  34  31  43   ALT   --    --   50  60*  63*  60*  73*  213*       Thyroid Studies    Recent Labs   Lab Test  05/15/17   1752   TSH  1.03       Microbiology:  Culture Micro   Date Value Ref Range Status   07/12/2017 No growth after 2 days  Final   07/12/2017 No growth after 2 days  Final   07/10/2017 No growth after 4 days  Final   07/10/2017 No growth after 4 days  Final   07/08/2017 No growth  Final   07/08/2017 Culture negative monitoring continues  Final   07/07/2017 No growth  Final   07/07/2017 No growth  Final   07/06/2017 No growth  Final   07/06/2017 No growth  Final   07/06/2017 (A)  Final    <10,000 colonies/mL Candida tropicalis Susceptibility testing not routinely done   07/04/2017 No growth  Final   07/04/2017 No growth  Final   07/02/2017 No growth  Final   07/02/2017 No growth  Final   07/02/2017 No growth  Final   07/01/2017 No growth  Final   07/01/2017 No growth  Final   06/26/2017 No growth  Final   06/26/2017 No growth  Final   06/26/2017 No growth  Final   06/19/2017   Final    50,000 to 100,000 colonies/mL mixed urogenital harshal Susceptibility testing not   routinely done     06/19/2017 (A)  Final    Heavy growth Enterobacter cloacae complex  Moderate growth Enterococcus faecalis  Moderate growth Coagulase negative Staphylococcus Susceptibility testing not   routinely done     06/19/2017 No anaerobes isolated  Final   06/19/2017 No growth  Final   06/19/2017 No growth  Final   06/01/2017 No growth  Final   05/31/2017 Light growth Normal harshal  Final   05/31/2017 No growth  Final   05/30/2017 Light growth Normal harshal  Final   05/30/2017 No growth  Final   05/29/2017 No growth  Final   05/29/2017 Moderate growth Normal harshal  Final   05/29/2017   Final    Canceled, Test credited  Cancelled by lab - Specimen never received.   05/29/2017 <1000 colonies/mL urogenital harshal  Final   05/21/2017 No growth  Final   05/21/2017 No growth  Final   05/21/2017 (A)  Final    >100,000 colonies/mL Escherichia coli ESBL ESBL (extended beta lactamase)   producing organisms require contact precautions.  Critical Value/Significant Value called to and read back by Agnes Molina RN [facility]   05.22.17 2054 CF         Urine Studies    Recent Labs   Lab Test  07/10/17   0220  07/07/17   0639  07/02/17   1416  07/02/17   0950  07/01/17   1123  06/26/17   1115   LEUKEST  Negative  Negative  Small*  Moderate*  Negative  Trace*   WBCU  1  2  5*  15*   --   1       Vancomycin Levels    Recent Labs   Lab Test  07/03/17   1155  07/03/17   0008  06/23/17   1051   VANCOMYCIN  24.2  21.7  26.6*       Virology:  CMV viral loads    Recent Labs   Lab Test  07/06/17   1318   CSPEC  EDTA PLASMA  CORRECTED ON 07/07 AT 0850: PREVIOUSLY REPORTED AS Whole blood, EDTA   anticoagulant       No results found for: CMQNT, CMVQ  EBV viral loads     Recent Labs   Lab Test  07/06/17   1318   EBRES  EBV DNA Not Detected     EBV DNA Copies/mL   Date Value Ref Range Status   07/06/2017 EBV DNA Not Detected EBVNEG [Copies]/mL Final[MS1.1]        Revision History        User Key Date/Time User Provider Type Action    > RR1.2 7/14/2017  6:18 PM Romeo Chow MD Physician Sign     RR1.1 7/14/2017  6:14 PM Romeo Chow MD Physician      MS1.1 7/14/2017  5:27 PM Daksha Conteh MD Resident Sign            Consults by Fox Salinas MD at 7/7/2017  3:01 PM     Author:  Fox Salinas MD Service:  Hematology Author Type:  Fellow    Filed:  7/7/2017  9:14 PM Date of Service:  7/7/2017  3:01 PM Creation Time:  7/7/2017  2:39 PM    Status:  Attested :  Fox Salinas MD (Fellow)    Cosigner:  Berlin Silverman MD at 7/8/2017  1:12 PM         Consult Orders:    1. Hematology IP Consult: Patient to be seen: Routine -  within 24 hours; Call back #: [phone]; Persistent pancytopenia, febrile leukopenia s/p infective rule out; Consultant may enter orders: Yes [[id number]] ordered by La Nena Benz MD at 07/07/17 1339           Attestation signed by Berlin Silverman MD at 7/8/2017  1:12 PM        Attestation:  Physician Attestation   I, Berlin Silverman, saw this patient with the resident and agree with the resident s findings and plan of care as documented in the resident s note.      I personally reviewed vital signs, medications, labs and imaging.    Key findings: 56 YOF with a history of RA on long-term MTX with acute on mild chronic cytopenias following cardiac arrest.  She did have mild leukopenia, macrocytic anemia and mild thrombocytopenia prior to this event, which is likely MTX and chronic inflammation induced.  I think this is effectively a ischemic marrow event but it is possible she also has dysplasia to explain this delayed recovery and transfusion dependence.  She might benefit from EPO and/or TPO agonists to reduce transfusion dependence, however before we embark on such a treatment strategy, I think a bone marrow biopsy is required to exclude MDS, which would tell us about prognosis and guide therapy.    Berlin Silverman  Date of Service (when I saw the patient): 7/7/17                               [facility]    History and Physical  Hematology / Oncology CONSULTATION      Date of Admission: 6/30/2015  Date of Service (when I saw the patient): 07/0[MA1.1]7[MA1.2]/2015[MA1.1]    ASSESSMENT:[MA1.2]    Bree Michel is a 55 yo female with PMHx of VSD, RA (on chronic methotrexate), and a recently diagnosed Afib resulting in cardiac arrest on 5/3[MA1.1]1, presenting with normocytic anemia, thrombocytopenia, and leukopenia without significant neutropenia, now in the setting of a fever of unknown origin.    Looking at the[MA1.2] sequence of events[AK1.1] her blood counts  acutely dropped immediately following her cardiac arrest. In the setting of long-term methotrexate use, the acute stress of[MA1.2] cardiac arrest[AK1.1] likely caused bone-marrow shock resulting in a reduction of hematopoiesis and a hypoproliferative marrow.[MA1.2] Coupled with transient use of[AK1.1] Bactrim[MA1.2] might have also contributed with marrow suppression.[AK1.1] A[MA1.2] primary[AK1.1] bone marrow malignancy (e.g. MDS, leukemia, etc.) or[MA1.2] bone marrow failure syndromes like[AK1.1] aplastic anemia would also be very unlikely at this time.Other considerations on the DDx for her cytpenia would still be an underlying infectious process yet to be identified (agree with ID workup at this time), new-onset autoimmune[MA1.2] process (such as ITP, but this would be diagnosis of exclusion in the absence of other possible etiology of thrombocytopenia), DIC (less likely give no s/sx of active bleeding).[MA1.3] In regards to her fevers, it is unlikely to related to neutropenia as her ANC is only mildly reduced.    We will continue to follow this patient and monitor blood counts throughout the weekend. If not improved into next week, we can consider[MA1.2] trial of Thrombopoetin (TPO) receptor[AK1.1] agonist[MA1.2] like Eltrombopag[AK1.1] to improve the platelet count. We would also re-vis[MA1.2]i[AK1.1]t the discussion of bone marrow biopsy to investigate other marrow pathologies[MA1.2] at a later date[AK1.1].    RECOMMENDATIONS:  -[MA1.2] Goal Hb >7 gm  -Goal Plt count 10K if no bleeding or 50K if active bleeding  -[AK1.1]Additional anemia work-up labs (B12, Folate[MA1.2], MMA[AK1.1])  -EPO level[MA1.2] as pt might have CKD related anemia[AK1.1]  -Repeat coag studies (INR/PTT)  -[MA1.2] DIC panel ([AK1.1]D-Dimer, fibrinogen[MA1.2])[AK1.1]  - Agree with ID assessment and would recommend viral, fungal, and parasitic serological work-up to identify fever source.  - Recommend D/C apixiban for Afib anticoagulation  given[MA1.2] severe thrombocytopenia that will increase the risk for bleeding  -Monitor for bleeding    Pt seen and discussed with Dr. Silverman who agrees with the plan.    Agree with below documentation from [place] MS4    Fox Salinas MD  Hematology Oncology fellow  [phone][AK1.1]          Chief Complaint[MA1.1]  Anemia and thrombocytopenia[MA1.2]    History of Present Illness  History obtained from chart review and discussed with patient.    Bree Mihcel is a 57 yo female with PMHx of VSD, RA (on chronic methotrexate), and a recently diagnosed Afib resulting in cardiac arrest on 5/31. She was discharged to an ARU, but readmitted on 6/19 with fever and a LUE infection, treated with broad-spectrum ABx and recently Bactrim from 6/27-7/1 for suspected cellulitis. She has now developed new, cyclical fevers beginning on 7/1 (Tmax: 39.2C) in the absence of an identifiable source. Additionally, she presents wit[MA1.1]h thrombocytopenia (27k), normocytic anemia (7.4 MCV: 93), and non-neutropenic leukopenia (1.4 with an ANC of 1.2). The onset of her low-blood counts acutely coincided with her cardiac arrest back on 5/31 and have remained chronically depressed since. She denies any sources of active bleeding. She does have some lightheadedness at the end of her PT sessions, but otherwise she does not. She does not have hemoptysis. No new rashes, bruising, or petechiae. No melena or hematochezia. Metatarsal joint point endorsed in bilateral hands, secondary to RA, but without any tenderness or new effusions in the large joints.     Notably, pt has been chronically treated with MTX for >12 years for RA. MTX was D/C on initial hospitalization 5/31 and has not be resumed since. In regards to her cyclical fevers of unknown origin, infectious work-up has been unfruitful to date with source, including negative blood and urine cx. There was initially suspicion the RUE cellulitis 2/2 IV site was contributing, however at this point in  "time with resolving infection, is a less likely source per ID.[MA1.2]    PAST MEDICAL HISTORY:[MA1.1]  Past Medical History:   Diagnosis Date     Hypertension      Rheumatoid arthritis(714.0)[MA1.4]        Past Surgical History:   Procedure Laterality Date     ANESTHESIA CARDIOVERSION N/A 5/17/2017    Procedure: ANESTHESIA CARDIOVERSION;  ANESTHESIA CARDIOVERSION;  Surgeon: GENERIC ANESTHESIA PROVIDER;  Location: [facility] OR     ARTHRODESIS WRIST  2000    Right wrist     FOOT SURGERY      4 left and 2 right     RELEASE CARPAL TUNNEL BILATERAL       REPAIR VENTRICULAR SEPTAL DEFECT  1969       Social History     Social History     Marital status:      Spouse name: Romeo Michel     Number of children: 0     Years of education: N/A     Occupational History      [employer]     Social History Main Topics     Smoking status: Never Smoker     Smokeless tobacco: Never Used     Alcohol use Yes      Comment: Glass of wine two evenings a night     Drug use: No     Sexual activity: Not on file     Other Topics Concern     Parent/Sibling W/ Cabg, Mi Or Angioplasty Before 65f 55m? No     Social History Narrative       Family History   Problem Relation Age of Onset     Breast Cancer Mother      Hypertension Mother      Alzheimer Disease Mother      Hypertension Father      Blood Disease Father      Lymphoa     Circulatory Father      A Fib     DIABETES Paternal Grandmother[MA1.5]        PHYSICAL EXAM:[MA1.1]  /70 (BP Location: Right arm)  Pulse 126  Temp 97.9  F (36.6  C) (Axillary)  Resp 18  Ht 1.48 m (4' 10.25\")  Wt 75.1 kg (165 lb 8 oz)  SpO2 91%  BMI 34.29 kg/m2[MA1.5]    Constitutional: Pleasant[MA1.1] female[MA1.2] resting comfortably in bed in NAD. Alert and interactive.   HEENT: PERRL, EOMI, anicteric sclera. Oral mucosa pink and moist with no lesions or thrush.  Hematologic / Lymphatic: No overt bleeding. No cervical or clavicular adenopathy.[MA1.1] 1-2 cm non-tender mass along right " IJ site.[MA1.2]  Respiratory: Non-labored breathing, good air exchange, lungs clear to auscultation bilaterally. No cough or wheeze noted.   Cardiovascular:[MA1.1] Regularly irregular[MA1.2] rhythm.[MA1.1] Normal S1 and S2.[MA1.2] No murmur or rub.   GI: Normoactive bowel sounds. Abdomen soft, non-distended, and non-tender. No palpable masses or organomegaly.[MA1.1] No splenomegaly.[MA1.2]  Genitourinary: Deferred.   Skin: Warm and dry. No concerning lesions or rash on exposed surfaces.[MA1.1] No petechiae. PICC line in place in right basilic without oozing.[MA1.2]  Musculoskeletal: Extremities grossly normal, non-tender,[MA1.1] with 1+ pitting edema in bilateral lower extremities.[MA1.2] Strong peripheral pulses. Good strength and ROM in bed.   Neurologic: A&O x 3, CNs 2-12 grossly intact, speech normal, sensation to light touch grossly WNL. Grossly non-focal.  Neuropsychiatric: Mentation and affect appear normal/appropriate.    Review of Systems[MA1.1]  The 10 point Review of Systems is negative other than noted in the HPI or here.[MA1.2]    Data  BMP[MA1.1]    Recent Labs  Lab 07/07/17  0715 07/06/17  0704 07/05/17  1957 07/05/17  0654    137 137 136   POTASSIUM 3.5 3.7 4.0 3.7   CHLORIDE 99 101 101 101   VIKTORIYA 8.5 8.6 8.0* 8.1*   CO2 32 31 30 29   BUN 25 23 26 25   CR 0.56 0.53 0.59 0.57   * 89 130* 114*[MA1.4]     CBC[MA1.1]    Recent Labs  Lab 07/07/17  0715 07/06/17  1318 07/05/17  0654 07/04/17  0550   WBC 1.4* 1.7* 1.4* 1.5*   RBC 2.41* 2.74* 2.37* 2.91*   HGB 7.4* 8.4* 7.2* 8.7*   HCT 22.5* 25.6* 21.8* 26.6*   MCV 93 93 92 91   MCH 30.7 30.7 30.4 29.9   MCHC 32.9 32.8 33.0 32.7   RDW 22.7* 22.7* 22.5* 22.3*   PLT 27* 34* 27* 36*       Ferritin: 3622 (7/5/17)  Coags: INR: 1.32, PT: 32 (6/28)  Lupus AC: Negative (6/10)  HIT Panel: Negative (6/5)    Blood Smear (7/6):   FINAL DIAGNOSIS:   Peripheral Blood Smear:     - Moderate normochromic, normocytic anemia; no increase in erythrocyte    regeneration; rare helmet cells (see comment)     - Marked leukopenia; neutropenia; lymphocytopenia     - Marked thrombocytopenia     Imaging:  No new imaging today.    Micro:  Blood cx: NGTD  UA: Negative leuk esterase and nitrites[MA1.2]    Discussed[MA1.1] the[MA1.2] Assessment and Plan with Dr. Farah[MA1.1], who is in agreement with the plan.     Luc Topete, MS4  Hematology Medical Student  P: [phone][MA1.2]     Revision History        User Key Date/Time User Provider Type Action    > AK1.1 7/7/2017  9:14 PM Fox Salinas MD Fellow Sign     MA1.3 7/7/2017  7:24 PM Franny Topeteew Medical Student Pend     MA1.4 7/7/2017  6:47 PM Yoselyn, Abhishek Medical Student      MA1.5 7/7/2017  6:41 PM Yoselyn Abhishek Medical Student      MA1.2 7/7/2017  6:28 PM Yoselyn Abhishek Medical Student      MA1.1 7/7/2017  2:39 PM Conneredward Abhishek Medical Student             Consults by Cornelio Danielle MD at 7/1/2017  2:45 PM     Author:  Cornelio Danielle MD Service:  Infectious Disease Author Type:  Physician    Filed:  7/1/2017  9:53 PM Date of Service:  7/1/2017  2:45 PM Creation Time:  7/1/2017  2:44 PM    Status:  Addendum :  Cornelio Danielle MD (Physician)     Consult Orders:    1. Infectious Disease General Adult IP Consult: Patient to be seen: ASAP within 4 hrs; Call back #: [phone]; Neutropenia with chills and elevated lactate; Consultant may enter orders: Yes [[id number]] ordered by Evin Barbosa MD at 07/01/17 1114                  [facility] SERVICE CONSULTATION     Patient:  Amelia Michel   Date of birth 1960, Medical record number [id number]  Date of Visit:  07/01/2017  Date of Admission: 6/19/2017  Consult Requester:[MM1.1] Dr. Trent[JR1.1]         Assessment and Recommendations:     ASSESSMENT:[MM1.1]  1. Fever, with ? developing s[JR1.1]epsis[MM1.1] no obvious infectious[JR1.1] source  2. LUE wound and cellulitis was getting better  3. Leukopenia and  thrombocytopenia  4. RA on prednisolone taper currently on 7.5 mg daily  5. Recent cardiac arrest and HFrEF (pending ICD placement)  6. H/o ESBL UTI s/p treatment  7. PCN allergy  8. Aflutter/Afib with RVR    RECOMMENDATION[MM1.1]S[JR1.1]:  1. Discontinue[MM1.1] TMP-SMX, coverage has been[JR1.1] broadened and could contribute to leukopenia/thrombocytopenia[JR1.2]  2. Agree[JR1.1] vancomycin and meropenem[MM1.1], broad-spectrum empiric coverage for sepsis  3. Consider obtaining additional imaging to r/o occult infectious focus,[JR1.1] abdomen and LUE (if concern is high)[MM1.1]  4. Follow pending blood cultures, repeat with new fevers[JR1.1]  5. If patient has diarrhea please repeat stool for c. Diff  6. Please trend inflammatory markers (CRP, procalcitonin)  7. Appreciate WOC input and care    DISCUSSION:  This patient is known to ID service (see Dr. Chow note 6/29), has been improving clinically until she started having fevers and[MM1.1] worsening[JR1.1] leukopenia. She has[MM1.1] had[JR1.1] long hospitalization[MM1.1]s since cardiac arrest 5/29[JR1.1]. She was having LUE wound due to IV extravasation and cellulitis. Wound culture grew enterobacter, CONS, and enterococcus. She was improving on bactrim switched from ertapenem on 6/27. Patient denies new symptoms and stated improvement of LUE cellulitis in-relation to pain and swelling.[MM1.1] There is no obvious new focus on exam, however given complicated and extended hospital course she is certainly a high risk for developing infection.[JR1.1] We[MM1.1] would[JR1.1] recommend covering her broadly and starting to look for possible source.[MM1.1] Discontinue TMP-SMX given broadened coverage and since[JR1.1] this may contribute to leukopenia and thrombocytopenia (meropenem will cover wound organisms)[MM1.1].  WBC has been on low side for weeks, not clear if this is true trend.[JR1.1]  Her CXR showed improve aeration of both lungs and pro calcitonin is normal.  However, lactic acid was trending up to 3.1 with repeat of 1.7. Possible source could be LUE cellulitis (less likely) and abdominal source. C. Diff and blood cultures pending. UA with few bacteria. Imaging of abdomen and repeat imaging of LUE could be pursuit.    Antibiotics:  Vancomycin 5/29-31, 6/19-23, 7/1-  Meropenem 5/29-6/3, 7/1-  Ertapenem 6/3-4, 6/19-27  Bactrim 6/27-    Thank you for asking us to see this patient. We will continue to follow with you.[MM1.1]  I am available over the weekend, please call with any questions or concerns.[JR1.1]    Patient seen and discussed with Dr. Danielle, staff physician[MM1.1]    Alonso Roach[MM1.2]  ID Fellow [phone][MM1.1]    Attending Physician Attestation:  This patient has been seen and evaluated by me, Cornelio Danielle MD.  I have discussed this patient with the ID fellow and agree with the findings and recommendations in this note. I have reviewed the patient's History and today's Medications, Vital Signs, Labs and Imaging.     I have edited this note to reflect my findings.     This is a 57yo woman with a recent history of cardiac arrhythmia that was initially admitted to hospital after cardiac arrest roughly 1 month ago. She was discharged to ARU 2 weeks ago but was readmitted with worsening LUE wound and fevers. Initial wound thought to be secondary to IV extravasation. The wound grew Enterobacter, Enterococcus and Coag negative Staph and she received broad spectrum antibiotics for this.  Not clear how much cellulitis vs chemical burn. She was recently switched to TMP-SMX after ID consultation.  The wound has been improving steadily. Concern now for new fever and possible developing sepsis.  The wound seems somewhat underwhelming as source of infection. In addition to fevers, she has had increasing lactic acid though reassuring procalcitonin. It is possible that previous broad spectrum antibiotics for wound were treating occult infection and this was 'uncovered'  with deescalation of antibiotics. Could consider additional imaging to rule this out. Would agree with current broad spectrum antibiotics coverage pending blood culture and further work-up.       Cornelio Danielle MD  ID Staff  [phone][JR1.1]  ________________________________________________________________    Consult Question: fevers and leukopenia  Admission Diagnosis: Sepsis  Sepsis (H)  Wound of left upper extremity  Wound of left upper extremity         History of Present Illness:   Amelia Michel is a 56 year old woman with hx of VSD repair (1969), Rheumatoid arthritis, and a recent diagnosis of Afib/Aflutter/SVT, who presented to [facility] on 5/29 after Out of Hospital cardiac arrest.  After ROSC in the field, she was admitted from 5/29-6/15, extubated 6/4. While at ARU (6/15-6/19), pt developed worsening LUE wound pain and fever, and was admitted back to [facility] on 6/19/17 for further workup.       The patient first developed the LUE wound in her previous admission on 5/29. It is unclear as to what incited the wound but is thought to be secondary to IV extravasation. On chart review, it seems that she initially developed blisters and sloughed off epidermis around multiple old IV site bruises and edema up to the shoulder. 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 No DVTs. Vascular surgery consult at that time showed no indication for surgical intervention.  She received Vancomycin (5/29-5/31), meropenem (5/29-6/3) and ertapenem(6/3-6/4) during that hospitalization for Aspiration PNA and ESBL UTI.      Following her discharge, the patient experienced worsening of the wound pain and had fevers which prompted admission. Wound culture on 6/19/17 grew Enterobacter cloacae complex, Enterococcus faecalis and coagulase negative staphylococcus. On repeat examination, the wound does show signs of inflammation  c/w cellulitis though unclear if it is chemical or infectious. The wound has been receding as per the patient since this admission, suggesting that either more effective wound cares or antibiotics or both may be improving the wound. patient was switched to bactrim to complete 14-day course (7/2).    Patient became febrile with leucopenia and thrombocytopenia.    Recent culture results include:  Culture Micro   Date Value Ref Range Status   07/01/2017 Pending  Incomplete   07/01/2017 Pending  Incomplete   06/26/2017 No growth  Final   06/26/2017 No growth after 5 days  Final   06/26/2017 No growth after 5 days  Final   06/19/2017   Final    50,000 to 100,000 colonies/mL mixed urogenital harshal Susceptibility testing not   routinely done     06/19/2017 (A)  Final    Heavy growth Enterobacter cloacae complex  Moderate growth Enterococcus faecalis  Moderate growth Coagulase negative Staphylococcus Susceptibility testing not   routinely done     06/19/2017 No anaerobes isolated  Final   06/19/2017 No growth  Final   06/19/2017 No growth  Final              Review of Systems:   CONSTITUTIONAL: fevers and chills  EYES: negative for icterus  ENT:  negative for hearing loss, tinnitus and sore throat  RESPIRATORY:  negative for cough with sputum and dyspnea  CARDIOVASCULAR:  negative for chest pain, dyspnea  GASTROINTESTINAL:  Abdominal pain, negative for nausea, vomiting, diarrhea and constipation  GENITOURINARY:  negative for dysuria  HEME:  No easy bruising  INTEGUMENT:  negative for rash and pruritus  NEURO:  Negative for headache           Past Medical History:     Past Medical History:   Diagnosis Date     Hypertension      Rheumatoid arthritis(714.0)             Past Surgical History:     Past Surgical History:   Procedure Laterality Date     ANESTHESIA CARDIOVERSION N/A 5/17/2017    Procedure: ANESTHESIA CARDIOVERSION;  ANESTHESIA CARDIOVERSION;  Surgeon: GENERIC ANESTHESIA PROVIDER;  Location: [facility] OR     ARTHRODESIS  WRIST  2000    Right wrist     FOOT SURGERY      4 left and 2 right     RELEASE CARPAL TUNNEL BILATERAL       REPAIR VENTRICULAR SEPTAL DEFECT  1969            Family History:     Family History   Problem Relation Age of Onset     Breast Cancer Mother      Hypertension Mother      Alzheimer Disease Mother      Hypertension Father      Blood Disease Father      Lymphoa     Circulatory Father      A Fib     DIABETES Paternal Grandmother             Social History:     Social History   Substance Use Topics     Smoking status: Never Smoker     Smokeless tobacco: Never Used     Alcohol use Yes      Comment: Glass of wine two evenings a night     History   Sexual Activity     Sexual activity: Not on file            Current Medications (antimicrobials listed in bold):       [START ON 7/2/2017] vancomycin (VANCOCIN) IV  1,250 mg Intravenous Q12H     meropenem  1 g Intravenous Q8H     miconazole   Topical BID     predniSONE  7.5 mg Oral Daily     apixaban ANTICOAGULANT  2.5 mg Oral BID     midodrine  10 mg Oral TID w/meals     calcium carbonate  500 mg Oral Daily     pantoprazole  40 mg Oral QAM     digoxin  125 mcg Oral Daily     sodium chloride (PF)  10 mL Intracatheter Q8H     heparin lock flush  5-10 mL Intracatheter Q24H     atenolol  50 mg Oral Daily     folic acid  1 mg Oral Daily     gabapentin  200 mg Oral TID     morphine 0.1% in solosite  2 g Transdermal TID     multivitamin, therapeutic with minerals  1 tablet Oral Daily     triamcinolone   Topical BID     sodium chloride (PF)  3 mL Intracatheter Q8H     insulin aspart  1-7 Units Subcutaneous TID AC     insulin aspart  1-5 Units Subcutaneous At Bedtime     calcium-vitamin D  2 tablet Oral Daily     co-enzyme Q-10  30 mg Oral Daily     melatonin  1 mg Oral At Bedtime            Allergies:     Allergies   Allergen Reactions     Penicillins      Tape [Adhesive Tape] Rash            Physical Exam:   Vitals were reviewed  Patient Vitals for the past 24 hrs:   BP Temp  Temp src Heart Rate Resp SpO2 Weight   07/01/17 1532 93/65 100.9  F (38.3  C) Axillary 92 16 99 % -   07/01/17 1318 - 101.8  F (38.8  C) Axillary - - - -   07/01/17 1221 116/79 100.8  F (38.2  C) - - - - -   07/01/17 1045 110/79 98.9  F (37.2  C) Oral 127 - 99 % -   07/01/17 0847 - - - 122 - - -   07/01/17 0846 - - - 122 - - -   07/01/17 0814 109/67 99.1  F (37.3  C) Oral - 18 98 % -   07/01/17 0625 - - - - - - 85 kg (187 lb 8 oz)   07/01/17 0305 90/64 - - 94 18 - -   07/01/17 0015 (!) 84/63 97.7  F (36.5  C) Oral - 18 97 % -   06/30/17 2205 97/66 - - - - - -   06/30/17 1942 141/76 98.2  F (36.8  C) Oral 91 20 97 % -     Ranges for his vital signs:  Temp:  [97.7  F (36.5  C)-101.8  F (38.8  C)] 100.9  F (38.3  C)  Heart Rate:  [] 92  Resp:  [16-20] 16  BP: ()/(63-79) 93/65  SpO2:  [97 %-99 %] 99 %    Physical Examination:  GENERAL:  well-developed, well-nourished, ill appearing middle age woman lying in bed in no acute distress.   HEENT:  Head is normocephalic, atraumatic   EYES:  Eyes have anicteric sclerae without conjunctival injection or stigmata of endocarditis.    ENT:  Oropharynx is moist without exudates or ulcers. Tongue is midline  NECK:  Supple. No  Cervical lymphadenopathy  LUNGS:  Tachypnea without increase work of breathing Clear to auscultation anterior bilateral.   CARDIOVASCULAR: irregular irregular tachycardia with no murmurs, gallops or rubs.  ABDOMEN:  + bowel sounds, soft, nontender. Distended No appreciable hepatosplenomegaly  SKIN:  No acute rashes.  Line(s) are in place without any surrounding erythema or exudate. No stigmata of endocarditis.  NEUROLOGIC:  Grossly nonfocal. Active x4 extremities  EXT: Extravasation wound with large area of epidermal loss, slough/necrosis in the middle 10x3 cm, no discharge         Laboratory Data:     Inflammatory Markers    Recent Labs   Lab Test  06/20/17   0709  06/19/17   1612  06/04/17   0408  06/03/17   0307  06/02/17   0342  06/01/17   0342   05/31/17   0357  05/30/17   0347  05/29/17   1055   03/20/16   1738   SED   --   63*   --    --    --    --    --   34*  22   --   29   CRP  110.0*  98.0*  23.0*  29.0*  55.0*  98.0*  170.0*  110.0*  58.0*   < >  28.4*    < > = values in this interval not displayed.       Hematology Studies  Recent Labs   Lab Test  07/01/17   0944  06/30/17 2147 06/30/17   0703  06/29/17   0554  06/28/17   1609  06/28/17   0555  06/27/17   0826   06/22/17   0653  06/21/17   0623  06/20/17   0709   06/16/17   0609   WBC  1.9*   --   2.3*  3.0*  3.0*  2.4*  2.8*   < >  2.5*  2.8*  2.8*   < >  2.3*   ANEU  1.1*   --    --    --   2.1   --    --    --   1.7  1.9  1.7   --   1.4*   AEOS  0.0   --    --    --   0.0   --    --    --   0.1  0.1  0.2   --   0.2   HGB  8.5*  8.6*  7.6*  8.9*  9.2*  6.9*  7.7*   < >  7.9*  9.5*  8.7*   < >  9.2*   MCV  90   --   92  91  92  95  94   < >  95  94  94   < >  96   PLT  20*   --   26*  27*  27*  27*  38*   < >  33*  43*  50*   < >  51*    < > = values in this interval not displayed.       Immune Globulin Studies  No lab results found.    Metabolic Studies   Recent Labs   Lab Test  07/01/17   1350  07/01/17   0910  06/30/17   2147  06/30/17   0703  06/29/17   0554  06/28/17   1754   NA   --   137  137  137  136  135   POTASSIUM  4.0  3.1*  3.1*  3.6  4.1  4.1   CHLORIDE   --   102  104  107  108  108   CO2   --   27  27  23  22  20   BUN   --   37*  39*  43*  45*  38*   CR   --   0.72  0.70  0.75  0.72  0.68   GFRESTIMATED   --   84  87  80  84  90       Hepatic Studies  Recent Labs   Lab Test  06/28/17   0555  06/27/17   0826  06/19/17   0608  06/16/17   0609  06/15/17   0556  06/05/17   0354   BILITOTAL  0.8  0.6  0.7  0.8  0.8  1.7*   ALKPHOS  126  129  180*  159*  168*  149   ALBUMIN  1.5*  1.4*  1.8*  1.8*  1.8*  1.9*   AST  41  54*  41  34  31  43   ALT  50  60*  63*  60*  73*  213*       Thyroid Studies    Recent Labs   Lab Test  05/15/17   1752   TSH  1.03       Microbiology:  Culture  Micro   Date Value Ref Range Status   07/01/2017 Pending  Incomplete   07/01/2017 Pending  Incomplete   06/26/2017 No growth  Final   06/26/2017 No growth after 5 days  Final   06/26/2017 No growth after 5 days  Final   06/19/2017   Final    50,000 to 100,000 colonies/mL mixed urogenital harshal Susceptibility testing not   routinely done     06/19/2017 (A)  Final    Heavy growth Enterobacter cloacae complex  Moderate growth Enterococcus faecalis  Moderate growth Coagulase negative Staphylococcus Susceptibility testing not   routinely done     06/19/2017 No anaerobes isolated  Final   06/19/2017 No growth  Final   06/19/2017 No growth  Final   06/01/2017 No growth  Final   05/31/2017 Light growth Normal harshal  Final   05/31/2017 No growth  Final   05/30/2017 Light growth Normal harshal  Final   05/30/2017 No growth  Final   05/29/2017 No growth  Final   05/29/2017 Moderate growth Normal harshal  Final   05/29/2017   Final    Canceled, Test credited Cancelled by lab - Specimen never received.   05/29/2017 <1000 colonies/mL urogenital harshal  Final   05/21/2017 No growth  Final   05/21/2017 No growth  Final   05/21/2017 (A)  Final    >100,000 colonies/mL Escherichia coli ESBL ESBL (extended beta lactamase)   producing organisms require contact precautions.  Critical Value/Significant Value called to and read back by Agnes Molina RN U6   05.22.17 2054 CF         Urine Studies    Recent Labs   Lab Test  07/01/17   1123  06/26/17   1115  06/19/17   1240  05/29/17   1305  05/21/17   0800  03/20/16   1702   LEUKEST  Negative  Trace*  Negative  Negative  Large*  Negative   WBCU   --   1  2  47*  >182*  <1       Vancomycin Levels  Recent Labs   Lab Test  06/23/17   1051  06/21/17   2332   VANCOMYCIN  26.6*  23.3       Serostatus:  No results found for: CMVG, CMIGG, CMIG, CMIM, CMVIGM, CMVM, CMLTX, HSVG1, HSVG2, HSVTP1, HP3133, HS12M, HS12GR, HS1GR, HS2GR, HERPES, HSIM, HSIG, HSIGR, HSVIGMAB, HSVG1, VARICZOSAB, EBVIGG, EBIGG,  EBVAGN, UN8608  No results found for: EBIG2, EBIGM, TOXG  No lab results found.    Invalid input(s): HSV12, VZVG, BEN808    Toxoplasma Testing  No lab results found.    Invalid input(s): TOXPL, TOXABM, TOXPCR    Virology:  CMV viral loads  No lab results found.  No results found for: CMQNT, CMVQ  EBV viral loads   No lab results found.  No results found for: EBVDN, EBRES, EBVDN, EBVSP, EBVPC, EBVPCR  BK viral loads No lab results found.    Invalid input(s): BKDN, BKVPC, BKVPCR  HSV testing  No lab results found.    Hepatitis B Testing No lab results found.  Hepatitis C Testing   No results found for: HCVAB, HQTG, HCGENO, HCPCR, HQTRNA, HEPRNA  Respiratory Virus Testing    No results found for: RS, FLUAG[MM1.1]         Revision History        User Key Date/Time User Provider Type Action    > [N/A] 7/1/2017  9:53 PM Cornelio Danielle MD Physician Addend     JR1.2 7/1/2017  9:52 PM Cornelio Danielle MD Physician Sign     JR1.1 7/1/2017  9:05 PM Cornelio Danielle MD Physician      MM1.2 7/1/2017  8:01 PM Alonso Roach MD Resident Sign     MM1.1 7/1/2017  7:15 PM Alonso Roach MD Resident             Consults by Uzma Ch MD at 6/19/2017  4:20 PM     Author:  Uzma Ch MD Service:  Surgery Author Type:  Resident    Filed:  6/19/2017  4:45 PM Date of Service:  6/19/2017  4:20 PM Creation Time:  6/19/2017  4:19 PM    Status:  Attested :  Uzma Ch MD (Resident)    Cosigner:  Donny Walker MD at 6/26/2017  8:51 AM         Consult Orders:    1. Surgery General Adult IP Consult: Patient to be seen: Routine within 24 hrs; Call back #: [id number]; left upper extremity wound, may need debridement; Consultant may enter orders: Yes [[id number]] ordered by Ernestina Garcia MD at 06/19/17 1541           Attestation signed by Donny Walker MD at 6/26/2017  8:51 AM        Patient seen and evaluated, agree with note.                                  General Surgery Consult Note    Staff: Dr. Walker  Date: 6/19/2017   Consulted for: LUE wound by Dr. Garcia    Assessment/Plan:  56 year old female with history of RA, HTN, A. Fib and cardiac arrest who presents with fever from rehab facility. She has had a LUE wound since the end of May related to an IV infiltration. There is an eschar and fibrinous material overlying the wound, no purulent drainage. No surrounding indications for infection so no need for debridement or drainage.     - MRI to rule out osteomyelitis  - No surgical indication at this time  - Local wound cares  - Defer antibiotic decision to primary team    Discussed with Dr. Aaron Ch MD  General Surgery, PGY-1  Pg [phone]    ------------------------------------------  HPI:   Amelia Michel is a 56 year old female with history of RA, HTN, A. Fib and cardiac arrest on unknown etiology with subsequent prolonged hospital stay including therapeutic hypothermia and MOF who is being evaluated for LUE wound. This wound began at the end of May around memorial day when an IV infiltrated with an unknown pressor. Wound has not healed since that time. Vascular consulted at the time of infiltration and determined there were no interventions needed at this time. Patient was in rehab and developed fevers to 102F. Also had chills. No localizing symptoms. No cough or dysuria. Cultures of left arm were taking.   ?  Review of Systems:  ROS: 10 point ROS neg other than the symptoms noted above in the HPI.    PMH:[MM1.1]  Past Medical History:   Diagnosis Date     Hypertension      Rheumatoid arthritis(714.0)[MM1.2]         PSHx:[MM1.1]  Past Surgical History:   Procedure Laterality Date     ANESTHESIA CARDIOVERSION N/A 5/17/2017    Procedure: ANESTHESIA CARDIOVERSION;  ANESTHESIA CARDIOVERSION;  Surgeon: GENERIC ANESTHESIA PROVIDER;  Location: [facility] OR     ARTHRODESIS WRIST  2000    Right wrist     FOOT SURGERY      4 left and 2  right     RELEASE CARPAL TUNNEL BILATERAL       REPAIR VENTRICULAR SEPTAL DEFECT  1969[MM1.2]        Medications:[MM1.1]    Current Facility-Administered Medications on File Prior to Encounter:  [COMPLETED] NaCl 0.9 % infusion   [DISCONTINUED] 0.9% sodium chloride infusion   [DISCONTINUED] 0.9% sodium chloride infusion   [DISCONTINUED] 0.9% sodium chloride infusion   [DISCONTINUED] piperacillin-tazobactam (ZOSYN) 3.375 g vial to attach to  mL bag   [DISCONTINUED] vancomycin (VANCOCIN) 1,250 mg in NaCl 0.9 % 250 mL intermittent infusion   [DISCONTINUED] 0.9% sodium chloride BOLUS   [DISCONTINUED] ertapenem (INVanz) 1 g vial to attach to  mL bag   [DISCONTINUED] HOLD MEDICATION   [DISCONTINUED] vancomycin (VANCOCIN) 1,500 mg in NaCl 0.9 % 250 mL intermittent infusion   [DISCONTINUED] atenolol (TENORMIN) tablet 50 mg   [DISCONTINUED] lidocaine 1 % 1 mL   [DISCONTINUED] lidocaine (LMX4) kit   [DISCONTINUED] lidocaine 1 % 1 mL   [DISCONTINUED] lidocaine (LMX4) kit   [DISCONTINUED] sodium chloride (PF) 0.9% PF flush 3 mL   [DISCONTINUED] sodium chloride (PF) 0.9% PF flush 3 mL   [DISCONTINUED] oxyCODONE (ROXICODONE) IR tablet 5 mg   [DISCONTINUED] oxyCODONE (ROXICODONE) IR tablet 5 mg   [DISCONTINUED] morphine 0.1% in solosite topical gel   [DISCONTINUED] ascorbic acid (VITAMIN C) tablet 500 mg   [DISCONTINUED] beta carotene capsule 25,000 Units   [DISCONTINUED] zinc sulfate (ZINCATE) capsule 220 mg   [DISCONTINUED] senna-docusate (SENOKOT-S;PERICOLACE) 8.6-50 MG per tablet 1-2 tablet   [DISCONTINUED] digoxin (LANOXIN) tablet 250 mcg   [DISCONTINUED] acetaminophen (TYLENOL) tablet 650 mg   [DISCONTINUED] apixaban ANTICOAGULANT (ELIQUIS) tablet 2.5 mg   [DISCONTINUED] atenolol (TENORMIN) tablet 50 mg   [DISCONTINUED] calcium-vitamin D (CALTRATE) 600-400 MG-UNIT per tablet 2 tablet   [DISCONTINUED] cetirizine (zyrTEC) tablet 10 mg   [DISCONTINUED] co-enzyme Q-10 capsule 30 mg   [DISCONTINUED] folic acid  (FOLVITE) tablet 1 mg   [DISCONTINUED] gabapentin (NEURONTIN) capsule 200 mg   [DISCONTINUED] melatonin tablet 3 mg   [DISCONTINUED] multivitamin, therapeutic with minerals (THERA-VIT-M) tablet 1 tablet   [DISCONTINUED] predniSONE (DELTASONE) tablet 10 mg   [DISCONTINUED] triamcinolone (KENALOG) 0.1 % ointment   [DISCONTINUED] predniSONE (DELTASONE) tablet 7.5 mg   [DISCONTINUED] predniSONE (DELTASONE) tablet 5 mg   [DISCONTINUED] Patient is already receiving anticoagulation with heparin, enoxaparin (LOVENOX), warfarin (COUMADIN)  or other anticoagulant medication   [DISCONTINUED] naloxone (NARCAN) injection 0.1-0.4 mg   [DISCONTINUED] glucose 40 % gel 15-30 g   [DISCONTINUED] dextrose 50 % injection 25-50 mL   [DISCONTINUED] glucagon injection 1 mg   [DISCONTINUED] insulin aspart (NovoLOG) inj (RAPID ACTING)   [DISCONTINUED] insulin aspart (NovoLOG) inj (RAPID ACTING)   [DISCONTINUED] dextrose 10 % 1,000 mL infusion   [DISCONTINUED] melatonin tablet 1 mg     Current Outpatient Prescriptions on File Prior to Encounter:  ertapenem (INVANZ) 1 GM vial Inject 1 g into the vein every 24 hours   insulin aspart (NOVOLOG PEN) 100 UNIT/ML injection Inject 1-7 Units Subcutaneous 3 times daily (before meals)   insulin aspart (NOVOLOG PEN) 100 UNIT/ML injection Inject 1-5 Units Subcutaneous At Bedtime   melatonin 3 MG tablet Take 1 tablet (3 mg) by mouth nightly as needed for sleep   melatonin 1 MG TABS tablet Take 1 tablet (1 mg) by mouth At Bedtime   morphine 0.1% in solosite topical gel Place 2 g onto the skin 3 times daily   multivitamin, therapeutic with minerals (THERA-VIT-M) TABS tablet Take 1 tablet by mouth daily   [START ON 7/15/2017] predniSONE (DELTASONE) 5 MG tablet Take 1 tablet (5 mg) by mouth daily   predniSONE (DELTASONE) 10 MG tablet Take 1 tablet (10 mg) by mouth daily   [START ON 7/1/2017] predniSONE (DELTASONE) 2.5 MG tablet Take 3 tablets (7.5 mg) by mouth daily   vancomycin 1,500 mg Inject 1,500 mg into  the vein every 12 hours   zinc sulfate (ZINCATE) 220 (50 ZN) MG capsule Take 1 capsule (220 mg) by mouth daily   ascorbic acid 500 MG TABS Take 1 tablet (500 mg) by mouth daily   beta carotene 51939 UNIT capsule Take 1 capsule (25,000 Units) by mouth daily   acetaminophen (TYLENOL) 325 MG tablet Take 2 tablets (650 mg) by mouth every 4 hours as needed for mild pain   apixaban ANTICOAGULANT (ELIQUIS) 2.5 MG tablet Take 1 tablet (2.5 mg) by mouth 2 times daily   cetirizine (ZYRTEC) 10 MG tablet Take 1 tablet (10 mg) by mouth daily   atenolol (TENORMIN) 50 MG tablet Take 1 tablet (50 mg) by mouth daily   digoxin (LANOXIN) 250 MCG tablet Take 1 tablet (250 mcg) by mouth daily   triamcinolone (KENALOG) 0.1 % ointment Apply topically 2 times daily   senna-docusate (SENOKOT-S;PERICOLACE) 8.6-50 MG per tablet Take 1-2 tablets by mouth 2 times daily   Calcium Carb-Cholecalciferol 600-800 MG-UNIT TABS Take 2 tablets by mouth every morning   Coenzyme Q10 (COQ-10 PO) Take 1 tablet by mouth daily In the morning   gabapentin (NEURONTIN) 100 MG capsule Take 2 capsules (200 mg) by mouth 3 times daily (Patient taking differently: Take 200 mg by mouth Take 2 capsules (200 mg) by mouth every 6 hours (4 am, 10am, 4pm, 10pm))   folic acid (FOLVITE) 1 MG tablet Take 1 mg by mouth daily.[MM1.2]       Allergies:[MM1.1]   Penicillins and Tape [adhesive tape][MM1.2]     SocHx:[MM1.1]  Social History     Social History     Marital status:      Spouse name: Romeo Michel     Number of children: 0     Years of education: N/A     Occupational History      [employer]     Social History Main Topics     Smoking status: Never Smoker     Smokeless tobacco: Never Used     Alcohol use Yes      Comment: Glass of wine two evenings a night     Drug use: No     Sexual activity: Not on file     Other Topics Concern     Parent/Sibling W/ Cabg, Mi Or Angioplasty Before 65f 55m? No     Social History Narrative[MM1.2]  "    FamHx:[MM1.1]  Family History   Problem Relation Age of Onset     Breast Cancer Mother      Hypertension Mother      Alzheimer Disease Mother      Hypertension Father      Blood Disease Father      Lymphoa     Circulatory Father      A Fib     DIABETES Paternal Grandmother[MM1.2]       Negative for bleeding disorders, clotting disorders, or problems with anesthesia    Physical Examination[MM1.1]   BP 95/62 (BP Location: Right arm)  Temp 97.6  F (36.4  C) (Oral)  Resp 16  Ht 1.48 m (4' 10.25\")  Wt 76.2 kg (168 lb 1.6 oz)  SpO2 100%  BMI 34.83 kg/m2[MM1.2]  General: Awake and alert. NAD  Pulm: Non-labored breathing  CV: RRR  Musculoskel/Extremities: Radial pulse 2+, LUE wound at ventral elbow region with overlying eschar, no purulent material expressed. No surrounding erythema or area of fluctuance.    Labs: Reviewed   UA Few bacteria, +nitrite, - LE  WBC 2.5  Hgb 10.2  Plt 51  Lactate 3.1  Albumin 1.8  Cr 0.66    Imaging: Reviewed  Left elbow XR  Impression:   1. No radiographic evidence to suggest osteomyelitis.  2. Diffuse subcutaneous soft tissue stranding, may represent  cellulitis in the provided clinical setting.[MM1.1]     Revision History        User Key Date/Time User Provider Type Action    > MM1.2 6/19/2017  4:45 PM Uzma Ch MD Resident Sign     MM1.1 6/19/2017  4:19 PM Uzma Ch MD Resident             Consults by Shashi Dickson MD at 6/23/2017  1:46 PM     Author:  Shashi Dickson MD Service:  Cardiology Author Type:  Physician    Filed:  6/26/2017  7:42 AM Date of Service:  6/23/2017  1:46 PM Creation Time:  6/23/2017  1:34 PM    Status:  Signed :  Shashi Dickson MD (Physician)     Consult Orders:    1. Cardiology General Adult IP Consult: Patient to be seen: Routine within 24 hrs; Call back #: [phone]; Atrial fib/flutter  with RVR; Consultant may enter orders: Yes [[id number]] ordered by Johnie Chino MD at 06/23/17 1310            " "           Cardiology Inpatient Consultation  June 23, 2017    Reason for Consult:  A cardiology consult was requested by Dr. Chino from the medicine service to provide clinical guidance regarding afib w/RVR.    HPI:   Amelia Michel is a 56 year old woman with hx of VSD repair (1969), Rheumatoid arthritis, and a recent diagnosis of Afib/Aflutter/SVT, who presented to [facility] on 5/29 after Out of Hospital cardiac arrest with shockable rhythm. After ROSC in the field, she was admitted from 5/29-6/15, extubated 6/4. While at [facility] (6/15-6/19), pt developed worsening LUE wound pain and fever, and was admitted back to [facility] for further workup.[JO1.1] Wound culture on 6/19/17 grew Enterobacter cloacae complex, enterococcus faecalis and coagulase negative staphylococcus. She is well known to EP.     Cardiology is consulted today as the patient developed afib with RVR, rates 120-130 despite Atenolol 50 mg. She did get an additional dose of digoxin 125 mcg[JO1.2] yesterday[JO1.3]  AM. Her digoxin level 6/20/17 was 3.4,[JO1.2] 6/21/17 digoxin[JO1.3] was 1.8[JO1.2] after holding suggesting that her digoxin level on the 20th was actually high[JO1.3]. Patient is noted to have gross anasarca and her weight is up appx 25 lbs[JO1.2] after triggering sepsis protocol with a lactic acid of 3.1[JO1.3]. She complains of dyspnea and orthopnea which she attributes to her volume status. She denies chest pain or palpitation, light headedness, syncope. She does complain of feeling \"worn out.\"     [JO1.2]        Review of Systems:    Complete review of systems was performed and negative except per HPI.    PMH:  Past Medical History:   Diagnosis Date     Hypertension      Rheumatoid arthritis(714.0)      Active Problems:  Patient Active Problem List    Diagnosis Date Noted     Sepsis (H) 06/19/2017     Priority: Medium     Wound of left upper extremity 06/19/2017     Priority: Medium     Critical illness myopathy 06/16/2017     Priority: Medium "     Acute respiratory failure with hypoxia (H) 06/09/2017     Priority: Medium     Hypertension      Priority: Medium     Cardiogenic shock (H) 05/29/2017     Priority: Medium     Atrial tachycardia (H) 05/16/2017     Priority: Medium     Lymphedema of both lower extremities 04/04/2017     Priority: Medium     Other rheumatoid arthritis with rheumatoid factor of multiple sites (H) 05/02/2016     Priority: Medium     Hyperlipidemia LDL goal <130 02/22/2011     HTN (hypertension)      Primary pulmonary hypertension (H)      Seeing [facility].        Social History:  Social History   Substance Use Topics     Smoking status: Never Smoker     Smokeless tobacco: Never Used     Alcohol use Yes      Comment: Glass of wine two evenings a night     Family History:  Family History   Problem Relation Age of Onset     Breast Cancer Mother      Hypertension Mother      Alzheimer Disease Mother      Hypertension Father      Blood Disease Father      Lymphoa     Circulatory Father      A Fib     DIABETES Paternal Grandmother        Medications:    vancomycin (VANCOCIN) IV  1,750 mg Intravenous Q24H     pantoprazole  40 mg Oral QAM     sodium chloride (PF)  10 mL Intracatheter Q8H     heparin lock flush  5-10 mL Intracatheter Q24H     apixaban ANTICOAGULANT  2.5 mg Oral BID     ascorbic acid  500 mg Oral Daily     atenolol  50 mg Oral Daily     beta carotene  25,000 Units Oral Daily     cetirizine  10 mg Oral Daily     ertapenem  1 g Intravenous Q24H     folic acid  1 mg Oral Daily     gabapentin  200 mg Oral TID     morphine 0.1% in solosite  2 g Transdermal TID     multivitamin, therapeutic with minerals  1 tablet Oral Daily     triamcinolone   Topical BID     zinc sulfate  220 mg Oral Daily     sodium chloride (PF)  3 mL Intracatheter Q8H     insulin aspart  1-7 Units Subcutaneous TID AC     insulin aspart  1-5 Units Subcutaneous At Bedtime     calcium-vitamin D  2 tablet Oral Daily     co-enzyme Q-10  30 mg Oral Daily      melatonin  1 mg Oral At Bedtime     predniSONE  10 mg Oral Daily         IV fluid REPLACEMENT ONLY       - MEDICATION INSTRUCTIONS -       - MEDICATION INSTRUCTIONS -         Physical Exam:  Temp:  [95.3  F (35.2  C)-97.7  F (36.5  C)] 97.2  F (36.2  C)  Pulse:  [101] 101  Heart Rate:  [] 120  Resp:  [16-20] 16  BP: ()/(31-66) 89/31  SpO2:  [98 %-100 %] 98 %    Intake/Output Summary (Last 24 hours) at 06/23/17 1334  Last data filed at 06/23/17 0632   Gross per 24 hour   Intake             1170 ml   Output                0 ml   Net             1170 ml     GEN: pleasant, no acute distress[JO1.1], gross anasarca[JO1.2]  HEENT: no icterus  CV:[JO1.1] Irregularly irregular, tachycardic,[JO1.2] no murmurs/rubs/s3/s4, no heave.   CHEST: clear to ausculation bilaterally,[JO1.1] crackles in bases[JO1.2]  ABD: soft, non-tender, normal active bowel sounds  EXTR:[JO1.1] Lymphedema wrappings[JO1.2]. No clubbing, cyanosis or edema.   NEURO: alert oriented, speech fluent/appropriate, motor grossly nonfocal    Diagnostics:  All labs and imaging were reviewed, of note:[JO1.1]    All laboratory data reviewed[JO1.2]    Lab Results   Component Value Date    TROPI 0.381 () 05/30/2017    TROPI 0.457 () 05/30/2017    TROPI 0.569 () 05/30/2017    TROPI 0.912 () 05/29/2017    TROPI 2.028 () 05/29/2017    TROPONIN 0.19 () 05/29/2017       EKG:      Transthoracic echocardiogram[JO1.1] 6/5/17[JO1.2]:    Limited, technically difficult study. Extremely difficult acoustic windows  despite the use of contrast for endcardial border definition.  Borderline (EF 50-55%) reduced left ventricular function is present.  Additionally, the abnormal non-specific septal motion and irregular cardiac  cycle contribute to the difficulty in accurately measuring LVEF.  The IVC is dilated at 2.5 cm, c/w increased RAP. The RAP is in excess of 15  MmHg.[JO1.1]    Cardiac MRI   1.  Normal left ventricular size and low-normal systolic function  with  a calculated ejection fraction of  55 %.  2.  Mildly dilated right ventricular size and normal right ventricular  systolic function with a calculated ejection fraction of 51%. There is  a focal area of dyskinesis in the right ventricular outflow tract, the  etiology of which is unclear. If the patient's clinical history is  compatible, this could represent the site of prior surgical  modification of the RVOT.   3.  Normal variant pulmonary venous drainage into the left atrium with  a common origin of the left pulmonary veins and measurements as  described below.   4.  Severe biatrial enlargement.  5.  Severe eccentric tricuspid regurgitation, the mechanism of which  is not clearly demonstrated.  6.  Mild to moderate mitral regurgitation.  7.  There are no intracardiac thrombi; specifically, the left atrial  appendage is free of thrombus.  8.  On delayed enhancement imaging, there is hyperenhancement at the  RV septal insertion site. This is a nonspecific finding which can be  seen in the setting of diseases involving the right ventricle.    Coronary Angio 5/29/17  [JO1.2]  Assessment and Recommendation:  Amelia Michel is a 56 year old woman with hx of VSD repair (1969), Rheumatoid arthritis, and a recent diagnosis of Afib/Aflutter/SVT, who presented to [facility] on 5/29 after Out of Hospital cardiac arrest with shockable rhythm. After ROSC in the field, she was admitted from 5/29-6/15, extubated 6/4. While at ARU (6/15-6/19), pt developed worsening LUE wound pain and fever, and was admitted back to [facility] for further workup.[JO1.1] Wound culture on 6/19/17 grew Enterobacter cloacae complex, enterococcus faecalis and coagulase negative staphylococcus. She is well known to EP.[JO1.2]       1.[JO1.1] Afib with RVR - The patient has multiple etiologies for developing afib (and multiple modalities have been tried for rate control and rhythm control including metoprlol, diltiazem, sotalol, propanolol, DCCV). Her  threshold for going into afib is probably quite low as she has severe biatrial enlargement. However, it is a secondary issue; secondary to her infectious course and sepsis.     At present, she has been tolerating the Atenolol 50 mg qd with PRN 25 mg for rates consistently >120 bpm. With her RVR today[JO1.2]. S[JO1.3]he does have gross anasarca on exam and i[JO1.2]f this is intravascular it may also be contributing to her RVR.[JO1.3]    -Digoxin levels[JO1.2] were elevated and given it has significant risk, we would D/C Digoxin and use it only if necessary[JO1.3]  -[JO1.2]Would recommend clarification of volume status with a limited ECHO to see the IVC. If dilated and without normal respiratory variation, would eduarde.[JO1.3]  -Continue atenolol 50 mg qd PO[JO1.2] and[JO1.3] Atenolol 25 mg PRN rates consistently >120-130   -Patient likely has afib with RVR secondary to increased catecholamine surge in the setting of infection and sepsis   -In this setting, it is permissible for her to have RVR as it is physiologic and aggressive rate control may do more harm than good[JO1.2] as she has been difficult to rate control in the past with significant adverse drug reaction[JO1.3]  -Consider formal EP consult if rates are consistently >[JO1.2]120-[JO1.3]130 or if patient is symptomatic[JO1.2]           I have discussed the above with [JO1.1] Randolph[JO1.2].    Thank you for consulting the cardiovascular services at the [facility]. Please do not hesitate to call us with any questions.     Darnell Marie PA-C  [facility] Cardiology Consult Team  Pager [phone] or [phone][JO1.1]    I have seen and examined the patient today as part of a shared visit with Andrei Marie PA-C. I reviewed today's ECG/Tele and imaging. On exam today I found her in atrial fibrillation with evidence of volume overlaod. Decision(s) made by me today include heart rate goals and assessment of volume status. She has elevated  lactic acid levels which continue to trigger a sepsis alert. However, she has necrotic tissue in her upper arm as well. By my exam she is hypervolemic.     NB - she can remove the LifeVest for an echocardiogram.    The above was discussed with team to complete.     Shashi Dickson MD[WA1.1]         Revision History        User Key Date/Time User Provider Type Action    > WA1.1 6/26/2017  7:42 AM Shashi Dickson MD Physician Sign     JO1.3 6/23/2017  3:32 PM OrmsbyDarnell meeks PA-C Physician Assistant - C Sign     JO1.2 6/23/2017  2:26 PM Darnell Marie PA-C Physician Assistant - C      JO1.1 6/23/2017  1:34 PM Darnell Marie PA-C Physician Assistant - C             Consults by Romeo Chow MD at 6/21/2017  4:41 PM     Author:  Romeo Chow MD Service:  Infectious Disease Author Type:  Physician    Filed:  6/21/2017 10:39 PM Date of Service:  6/21/2017  4:41 PM Creation Time:  6/21/2017  4:41 PM    Status:  Signed :  Romeo Chow MD (Physician)     Consult Orders:    1. Infectious Disease General Adult IP Consult: Patient to be seen: Routine within 24 hrs; Call back #: [phone]; L UE wound infection, cellulitis.; Consultant may enter orders: Yes [[id number]] ordered by Johnie Chino MD at 06/21/17 0759                [facility] Infectious Disease Inpatient Consultation      Amelia Michel MRN# [id number]   YOB: 1960 Age: 56 year old   Date of Admission and time: 6/19/2017  2:54 PM     Reason for consult: I was asked by Dr. Chino  to evaluate this patient for LUE wound infection,cellulitis.           Assessment and Plan:   Recommendations:[RA1.1]  1. Continue Vancomycin and Ertapenem.  2. Agree with d/c Levaquin.  3. Would consider narrowing antibiotics following clinical improvement of the LUE wound.[RA1.2]    Assessment:[RA1.1]  #[RA1.2] Left Upper extremity wound[RA1.1] and soft tissue[RA1.2]  infection- cellulitis[RA1.1]    Amelia Michel is a 56 year old woman with hx of VSD repair (1969), Rheumatoid arthritis, and a recent diagnosis of Afib/Aflutter/SVT, who presented to [facility] on 5/29 after Out of Hospital cardiac arrest.  After ROSC in the field, she was admitted from 5/29-6/15, extubated 6/4. While at ARU (6/15-6/19), pt developed worsening LUE wound pain and fever, and was admitted back to [facility] on 6/19/17 for further workup.     The patient first developed the LUE wound in her previous admission on 5/29. It is unclear as to what incited the wound but is thought to be secondary to IV extravasation. On chart review, it seems that she initially developed blisters and sloughed off epidermis around multiple old IV site bruises and edema up to the shoulder. 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 No DVTs. Vascular surgery consult at that time showed no indication for surgical intervention.  She received Vancomycin (5/29-5/31), meropenem (5/29-6/3) and ertapenem(6/3-6/4) during that hospitalization for Aspiration PNA and ESBL UTI.    Following her discharge, the patient experienced worsening of the wound pain and had fevers which prompted admission. Wound culture on 6/19/17 grew Enterobacter cloacae complex, enterococcus faecalis and coagulase negative staphylococcus. On examination, the wound does show signs of inflammation c/w cellulitis though unclear if it is chemical or infectious.  The wound has been receding as per the patient[RA1.2] since this admission, suggesting that either more eefective wound cares or antibiotics or both may be improving the wound[RR1.1].  As of now, would continue Vancomycin and ertapenem and would consider narrowing and deescalating following[RA1.2] further[RR1.1] clinical improvement.[RA1.2]        Thank you very much Dr. Chino for your kind consultation  and for involving me in the care of Mrs. Amelia Michel. I will keep in touch with you, and please do not hesitate to call me for any question.     Monserrat Haddad   Pager: [phone][RA1.1]  06/21/2017[RA1.3]     Scribe Disclosure:   I, Monserrat Arya, am serving as a scribe; to document services personally performed by MEME Chow- -based on data collection and the provider's statements to me.     Provider Disclosure:  I agree with above History, Review of Systems, Physical exam and Plan.  I have reviewed the content of the documentation and have edited it as needed. I have personally performed the services documented here and the documentation accurately represents those services and the decisions I have made.      MEME Chow M.D.  [facility] Service Staff  [phone][RR1.1]             History of Present Illness:   This patient is a 56 year old female with hx of VSD repair (1969), Rheumatoid arthritis, and a recent diagnosis of Afib/Aflutter/SVT, who presented to [facility] on 5/29 after Out of Hospital cardiac arrest with shockable rhythm. After ROSC in the field, she was admitted from 5/29-6/15, extubated 6/4. While at ARU (6/15-6/19), pt developed worsening LUE wound pain and fever, and was admitted back to [facility] for further workup.[RA1.1]     Patient first developed a fever up to 101 on 6/18/17 which was associated with chills and diaphoresis. She states that her left upper extremity wound first occurred around[RA1.2] M[RR1.1]emorial day, around the time of the cardiac arrest, and felt that it got worse when fluid extravasated into the wound from her IV. Currently, the wound is red, hot to touch with swelling extending up to the shoulder. While at ARU, pt was noticing worsening pain in LUE while performing therapeutic exercises . She also complains of weakness in movement of left index finger and thumb and wrist pain. Denies any chest pain, SOB, nausea/vomiting or abdominal pain. Denies any other wounds or  sores.[RA1.2] She is quite diaphoretic during our interview.[RR1.1] Blood, wound and urine cultures were obtained on admission and patient was started on Vancomycin and Ertapenem. Levaquin was added on 6/20.[RA1.2] She is PCN allergic (she reports tongue swelling when she was 6 years old).[RR1.1]  [RA1.2]              Past Medical History:[RA1.1]     Past Medical History:   Diagnosis Date     Hypertension      Rheumatoid arthritis(714.0)[RA1.3]              Past Surgical History:[RA1.1]     Past Surgical History:   Procedure Laterality Date     ANESTHESIA CARDIOVERSION N/A 5/17/2017    Procedure: ANESTHESIA CARDIOVERSION;  ANESTHESIA CARDIOVERSION;  Surgeon: GENERIC ANESTHESIA PROVIDER;  Location: [facility] OR     ARTHRODESIS WRIST  2000    Right wrist     FOOT SURGERY      4 left and 2 right     RELEASE CARPAL TUNNEL BILATERAL       REPAIR VENTRICULAR SEPTAL DEFECT  1969[RA1.3]               Social History:[RA1.1]     Social History   Substance Use Topics     Smoking status: Never Smoker     Smokeless tobacco: Never Used     Alcohol use Yes      Comment: Glass of wine two evenings a night[RA1.3]             Family History:   I have reviewed this patient's family history[RA1.1]  Family History   Problem Relation Age of Onset     Breast Cancer Mother      Hypertension Mother      Alzheimer Disease Mother      Hypertension Father      Blood Disease Father      Lymphoa     Circulatory Father      A Fib     DIABETES Paternal Grandmother[RA1.3]             Immunizations:[RA1.1]     Immunization History   Administered Date(s) Administered     Pneumococcal 23 valent 08/31/2011     TDAP Vaccine (Adacel) 02/08/2011[RA1.3]             Allergies:[RA1.1]     Allergies   Allergen Reactions     Penicillins      Tape [Adhesive Tape] Rash[RA1.3]             Medications:[RA1.1]       pantoprazole  40 mg Oral QAM     [START ON 6/22/2017] vancomycin (VANCOCIN) in 250 mL intermittent infusion (for dose 501-1500 mg)  1,250 mg Intravenous Q12H      sodium chloride (PF)  10 mL Intracatheter Q8H     heparin lock flush  5-10 mL Intracatheter Q24H     digoxin  125 mcg Oral Daily     sodium chloride 0.9%  1,000 mL Intravenous Once     apixaban ANTICOAGULANT  2.5 mg Oral BID     ascorbic acid  500 mg Oral Daily     atenolol  50 mg Oral Daily     beta carotene  25,000 Units Oral Daily     cetirizine  10 mg Oral Daily     ertapenem  1 g Intravenous Q24H     folic acid  1 mg Oral Daily     gabapentin  200 mg Oral TID     morphine 0.1% in solosite  2 g Transdermal TID     multivitamin, therapeutic with minerals  1 tablet Oral Daily     triamcinolone   Topical BID     zinc sulfate  220 mg Oral Daily     sodium chloride (PF)  3 mL Intracatheter Q8H     insulin aspart  1-7 Units Subcutaneous TID AC     insulin aspart  1-5 Units Subcutaneous At Bedtime     calcium-vitamin D  2 tablet Oral Daily     co-enzyme Q-10  30 mg Oral Daily     melatonin  1 mg Oral At Bedtime     predniSONE  10 mg Oral Daily[RA1.3]       Infusions/Drips:[RA1.1]    IV fluid REPLACEMENT ONLY       - MEDICATION INSTRUCTIONS -       - MEDICATION INSTRUCTIONS -[RA1.3]                Review of Systems:   ROS:  All 12 systems reviewed.  Relevant positives/negatives noted in HPI above                 Physical Exam:[RA1.1]   Temp: 95.8  F (35.4  C) Temp src: Axillary BP: 96/52 Pulse: 122   Resp: 18 SpO2: 95 % O2 Device: None (Room air)      Wt Readings from Last 4 Encounters:   06/21/17 80.4 kg (177 lb 3.2 oz)   06/19/17 75.7 kg (166 lb 12.8 oz)   06/16/17 72.8 kg (160 lb 8 oz)   05/23/17 64.4 kg (141 lb 15.6 oz)[RA1.3]       Constitutional: awake, alert, cooperative, no apparent distress and appears at stated age, well nourished.   Head, ENT, Eyes, and Neck: Normocephalic, sinuses non-tender to palpation, external ears without lesions, moist buccal mucosa without oral thrush   PERRL, EOMI, pink conjunctivae, non-icteric sclera.   Neck supple without rigidity, no cervical/axillary/inguinal LA bilaterally.    Lungs: CTA bilaterally, no accessory muscle use, no dullness to percussion and no abnormal tactile fremitus.   CVS : irregularly irregular rhythm, variable S1/S2, no murmur, PMI was not displaced.   Abdomen: non-tender, non-distended, no masses, no bruit, no shifting dullness, normal BS.   Extremities : LUE wound with overlying eschar and fibrinous material, with surrounding warmth, edema and erythema, no active drainage, wrist tenderness, +1 pitting edema of bilateral lower extremities, normal ROM of all joints           Data:   Inflammatory Markers[RA1.1]    Recent Labs   Lab Test  06/20/17   0709  06/19/17   1612  06/04/17   0408  06/03/17   0307  06/02/17   0342  06/01/17   0342  05/31/17   0357  05/30/17   0347  05/29/17   1055   03/20/16   1738   SED   --   63*   --    --    --    --    --   34*  22   --   29   CRP  110.0*  98.0*  23.0*  29.0*  55.0*  98.0*  170.0*  110.0*  58.0*   < >  28.4*    < > = values in this interval not displayed.[RA1.3]       Metabolic Studies[RA1.1]       Recent Labs   Lab Test  06/21/17   1443  06/21/17   0851  06/21/17   0623  06/20/17   0709  06/19/17   1612   06/19/17   0608  06/16/17   0609  06/15/17   1753   06/09/17   0449   06/07/17   0422  06/06/17   1654   05/31/17   1018   NA   --    --   133  136  136   --   135  136  134   < >  142   < >  148*  146*   < >  148*   POTASSIUM   --    --   4.6  4.6  4.7   --   4.6  4.0  4.4   < >  4.1   < >  3.6  4.1   < >  4.3   CHLORIDE   --    --   105  105  105   --   104  103  102   < >  107   < >  108  106   < >  108   CO2   --    --   19*  24  24   --   21  28  26   < >  33*   < >  36*  36*   < >  32   ANIONGAP   --    --   9  7  7   --   10  5  7   < >  2*   < >  5  4   < >  8   BUN   --    --   39*  32*  32*   --   31*  27  30   < >  31*   < >  37*  40*   < >  46*   CR   --    --   0.73  0.74  0.71   --   0.66  0.66  0.69   < >  0.74   < >  0.75  0.78   < >  1.85*   GFRESTIMATED   --    --   83  81  85   --   >90  Non   American GFR Calc    >90  Non  GFR Calc    87   < >  81   < >  80  77   < >  28*   GLC   --    --   179*  79  149*   --   159*  141*  190*   < >  112*   < >  135*  144*   < >  138*   A1C   --    --    --    --    --    --    --    --    --    --    --    --    --    --    --   Canceled, Test credited   UNABLE TO CALCULATE % HBA1C DUE TO LOW HGB AND/OR LOW HGBA1C  NOTIFIED CHELSEA BEE RN AT 1253 ON 5/31/17 [facility]     VIKTORIYA   --    --   7.4*  7.5*  7.5*   --   7.8*  7.4*  7.3*   < >  7.5*   < >  7.4*  7.4*   < >  7.6*   PHOS   --    --    --    --    --    --    --    --    --    --    --    --   3.3  1.7*   < >   --    MAG   --    --   2.0  1.7  1.8   --    --   1.7   --    < >   --    --   1.9   --    < >  2.3   LACT  2.7*  3.1*   --   1.8  1.8   < >   --    --    --    < >   --    < >   --    --    < >   --    CKT   --    --    --    --    --    --    --    --    --    --   23*   --    --    --    --    --     < > = values in this interval not displayed.[RA1.3]       Hepatic Studies[RA1.1]    Recent Labs   Lab Test  06/19/17   0608  06/16/17   0609  06/15/17   0556  06/12/17   0413  06/11/17   0400   06/05/17   0354   06/01/17   0342  05/31/17   1018   BILITOTAL  0.7  0.8  0.8   --    --    --   1.7*   --   2.6*  2.4*   ALKPHOS  180*  159*  168*   --    --    --   149   --   125  129   ALBUMIN  1.8*  1.8*  1.8*   --    --    --   1.9*   --   1.9*  1.9*   AST  41  34  31   --    --    --   43   --   597*  765*   ALT  63*  60*  73*   --    --    --   213*   --   891*  939*   LDH   --    --    --   203  219   < >  353*   < >  583*   --     < > = values in this interval not displayed.[RA1.3]       Pancreatitis testing[RA1.1]    Recent Labs   Lab Test  05/30/17   0939  04/29/16   0749 03/15/16  02/04/11   0909 02/04/11   LIPASE  88   --    --    --    --    TRIG   --   251*  286  80  80[RA1.3]       Hematology Studies[RA1.1]      Recent Labs   Lab Test  06/21/17   0623  06/20/17   0709  06/19/17   1612   06/19/17   0608  06/16/17   0609  06/15/17   1753  06/15/17   0556   06/05/17   0531   05/30/17   0939   WBC  2.8*  2.8*  3.1*  2.5*  2.3*  2.7*  2.3*   < >  3.6*   < >  7.7   ANEU  1.9  1.7   --    --   1.4*   --   1.6   --   3.0   --   6.2   ALYM  0.5*  0.5*   --    --   0.5*   --   0.5*   --   0.6*   --   0.7*   TERRA  0.4  0.4   --    --   0.2   --   0.3   --   0.0   --   0.7   AEOS  0.1  0.2   --    --   0.2   --   0.1   --   0.0   --   0.0   HGB  9.5*  8.7*  9.0*  10.2*  9.2*  9.0*  9.6*   < >  8.6*   < >  9.8*   HCT  28.3*  25.7*  26.4*  30.6*  27.9*  26.5*  29.8*   < >  27.1*   < >  29.1*   PLT  43*  50*  44*  51*  51*  52*  52*   < >  15*   < >  43*    < > = values in this interval not displayed.[RA1.3]       Clotting Studies[RA1.1]    Recent Labs   Lab Test  06/21/17   0623  06/20/17   0709  06/19/17   1612  06/16/17   0609   05/30/17   1545  05/30/17   0939  05/30/17   0347   INR  1.42*  1.29*  1.44*  1.36*   < >  4.38*  4.45*  3.92*   PTT   --    --   31   --    --   42*  44*  40*    < > = values in this interval not displayed.[RA1.3]       Arterial Blood Gas Testing[RA1.1]    Recent Labs   Lab Test  06/04/17   0913  06/04/17   0408  06/03/17   1558  06/03/17   0905  06/02/17 2008   PH  7.44  7.45  7.40  7.35  7.45   PCO2  45  45  50*  53*  44   PO2  94  90  93  85  104   HCO3  30*  31*  31*  29*  31*   O2PER  40  40  40.0  40.0  40%[RA1.3]        Urine Studies[RA1.1]     Recent Labs   Lab Test  06/19/17   1240  05/29/17   1305  05/21/17   0800  03/20/16   1702  01/14/15   1137   URINEPH  5.5  6.0  5.5  5.5  5.5   NITRITE  Positive*  Negative  Positive*  Negative  Negative   LEUKEST  Negative  Negative  Large*  Negative  Negative   WBCU  2  47*  >182*  <1  1[RA1.3]       Microbiology:  6/19/17 Blood cx: NGTD  6/19/17 Wound Left Upper extremity cx:  Heavy growth Enterobacter cloacae complex         Moderate growth Enterococcus faecalis         Moderate growth Coagulase negative Staphylococcus   Culture &  Susceptibility      HEAVY GROWTH ENTEROBACTER CLOACAE COMPLEX (MARIA R)      Antibiotic Sensitivity Unit Status     AMIKACIN <=2 Susceptible ug/mL Final     AMPICILLIN >=32 Resistant ug/mL Final     AMPICILLIN/SULBACTAM >=32 Resistant ug/mL Final     CEFEPIME <=1 Susceptible ug/mL Final     CEFTAZIDIME >=64 Resistant ug/mL Final     CEFTRIAXONE >=64 Resistant ug/mL Final     CIPROFLOXACIN <=0.25 Susceptible ug/mL Final     GENTAMICIN <=1 Susceptible ug/mL Final     LEVOFLOXACIN <=0.12 Susceptible ug/mL Final     MEROPENEM <=0.25 Susceptible ug/mL Final     Piperacillin/Tazo >=128 Resistant ug/mL Final     TOBRAMYCIN <=1 Susceptible ug/mL Final     Trimethoprim/Sulfa <=1/19 Susceptible ug/mL Final          6/19/17 Urine cx: 50,000 to 100,000 colonies/mL mixed urogenital harshal       Last check of C difficile[RA1.1]  C Diff Toxin B PCR   Date Value Ref Range Status   06/21/2017  NEG Final    Negative  Negative: Clostridium difficile target DNA sequences NOT detected, presumed   negative for Clostridium difficile toxin B or the number of bacteria present   may be below the limit of detection for the test.   FDA approved assay performed using [place] GeneXpert real-time PCR.   A negative result does not exclude actual disease due to Clostridium difficile   and may be due to improper collection, handling and storage of the specimen or   the number of organisms in the specimen is below the detection limit of the   assay.[RA1.3]           Imaging:  MRI of the left elbow dated 6/19/2017.  IMPRESSION:  1. No marrow signal abnormalities within the left elbow to suggest  osteomyelitis.  2. Diffuse soft tissue edema within the subcutaneous tissues about the  elbow joint, findings most consistent with cellulitis.  3. Diffuse multicompartment soft tissue tissue edema within the  muscles about the left elbow, with a focal area of nonenhancement,  centered in the brachialis muscle. Differential includes myositis,  including infectious  or inflammatory etiology. A focal area of  myonecrosis is likely within the brachialis muscle. Correlate  clinically.  4. Partial-thickness tearing of the common extensor tendon    XR LEFT ELBOW 6/19/17  Impression:   1. No radiographic evidence to suggest osteomyelitis.  2. Diffuse subcutaneous soft tissue stranding, may represent  cellulitis in the provided clinical setting.    CXR 6/19/17  IMPRESSION: Multiple lines and tubes overlying the patient. Small  right pleural effusion is unchanged. No left pleural effusion. No  pneumothorax. Enteric jejunal feeding tube is unchanged. Hazy  opacities are seen throughout the left lung, unchanged. Elevation of  the right hemidiaphragm is again noted[RA1.1]         Revision History        User Key Date/Time User Provider Type Action    > RR1.1 6/21/2017 10:39 PM Romeo Chow MD Physician Sign     RA1.2 6/21/2017  6:56 PM Monserrat Haddad Medical Student Share     RA1.3 6/21/2017  4:42 PM Monserrat Haddad Medical Student      RA1.1 6/21/2017  4:41 PM Monserrat Haddad Medical Student             Consults by Priscilla Shanks MD at 6/21/2017  9:47 AM     Author:  Priscilla Shanks MD Service:  (none) Author Type:  Physician    Filed:  6/21/2017 10:59 AM Date of Service:  6/21/2017  9:47 AM Creation Time:  6/21/2017  9:30 AM    Status:  Signed :  Priscilla Shanks MD (Physician)     Consult Orders:    1. PM & R IP Consult: Patient to be seen: Routine - within 24 hours; physical deconditioning. s/p recent complicated hospital course including cardiac arrest. readmitted with sepsis from wound infection.; Consultant may enter orders: Yes [[id number]] ordered by Johnie Chnio MD at 06/20/17 1647                PM&R CONSULT NOTE     We were asked by Dr Sherman to see this patient to assess for rehabilitation   Date of encounter[FI1.1] 06/21/17[FI1.2]  Location 5B 5230  Patient seen and examined today. Coordinated with team.[FI1.1]      RECOMMENDATION[FI1.3]   Amelia Michel[FI1.4] is well known to the PM&R service through the consult dated 6/8. She was transferred from the ARU due to LUE weakness and pain. diagnosed with cellulitis and myositis, she has been afebrile following starting levofloxacin.   Notes fatigue and weakness   She is motivated and geared up to return to the intensity of the ARU setting   Recommend ARU upon stabilization. She will have the same goals as determined by the H&P dated 6/16 by Dr Rubi.   Anticipated rehab will consist of the following   90 minutes each of PT and OT  Rehabilitation nursing to closely address the LUE wound and monitor for worsening cellulitis.   Close management by physiatry  Hospitalist consult    DANNY is 2 weeks   She is agreeable to the plan of care     Thank you for consulting the PM&R Department.   For any questions, please feel free to page me at [phone]       Priscilla Shanks MD   Department of PM&R[FI1.3]      HPI/ACTIVE ISSUES   Amelia Michel is a 56 year old[FI1.1] Right handed[FI1.3] female, admitted to [facility] on 6/19/2017  She had been seen by PM&R service on 6/8/2017      Mrs. Amelia Michel is a 56-year old female with a PMHx of VSD repair (1969), RA, on chronic immunosuppression,  and a recent diagnosis of AFib/AFlutter/SVT who presented to [facility] initially on 05/29 after OOH cardiac arrest with shockable rhythm.   After ROSC in the field, she was transferred to [facility] for further management on 5/29/2017. Underwent therapeutic hypothermia in CV ICU. Was noted to have epitliform activity on EEG without actual seizures. Placed on Keppra ppx. Seen by Neurology and underwent EEG consistent with diffuse encephalopathy, with Left posterior hemisphere epielptiiform activity continues indicating focal cortical irritability in this region. No overt seizures. She required pressure support.  Extubated 06/04. Complicated by respiratory failure / aspiration pneumonica, ischemic liver injury  with secondary coagulopathy, CELSA, and electrolyte imbalance     She was seen by PM&R service on 6/8 and recommended ARU setting as she improved beyond the initial assessment on the 8th of June. She was discharged to ARU, but was transferred back here on 6/19/17 due to worsening pain in LUE while performing therapeutic exercises, and fever.   LUE wound developed around the time of cardiac arrest, differential diagnosis is cellulitis with myositis and possible area of myonecrosis. She is placed on levofloxacin. She was seen by surgery to rule out possible debridement. She has been afebrile on the 20th and 21st.   Noted for a brief PSV, tachycardic, required a bolus of fluid for progressive tachycardia to 140s. Received two units of platelets   She underwent EEG, Study with clear improvement compared to initial studies in this series of studies. Epileptiform activity continues indicating increased seizure tendency but no seizures recorded.[FI1.1]      Active Diet Order      Dysphagia Diet Level 3 Advanced Thin Liquids (water, ice chips, juice, milk gelatin, ice cream, etc)[FI1.5]    PREVIOUS LEVEL OF FUNCTION   She was independent with basic mobility and basic ADL's prior to admit.     Functionally, Amelia Michel was participating in the therapies in a motivated fashion while in the ARU. No further reports from PT/OT while in [facility].      REVIEW OF THE SYSTEMS   Total of ten systems reviewed, pertinent positives and negatives as follows[FI1.1]  She reports pain in the LUE, some improvement   Ongoing numbness in the left thumb and index finger   Notes weakness in the her left pinching ability[FI1.3]   Denies chest pain fever chills rigors  Denies any shortness of breath   Denies any nausea or vomiting   Denies any lightheadedness or dizziness   On[FI1.1] dysphagia[FI1.3] diet[FI1.1] 3[FI1.3] with thin[FI1.1]s[FI1.3]   Denies any new rashes   Mood euphoric   Voiding without any problems, no incontinence   Slept well  "last night[FI1.1]   Notes significant fatigue[FI1.3]   Remainder of the review of the systems was negative      Physical exam    Vital signs:  Temp: 98.4  F (36.9  C) Temp src: Oral BP: 112/68 Pulse: 129 Heart Rate: 110 Resp: 18 SpO2: 92 % O2 Device: None (Room air)   Height: 148 cm (4' 10.25\") Weight: 78.6 kg (173 lb 3.2 oz)  Estimated body mass index is 35.89 kg/(m^2) as calculated from the following:    Height as of this encounter: 1.48 m (4' 10.25\").    Weight as of this encounter: 78.6 kg (173 lb 3.2 oz).[FI1.1]  She is lying in bed   Tremulousness noted   LUE in a dressing   Shoulder ROM is intact and functional   Weakness in the LUE at 3+/5 except unable flex her index finger and unable to make a pinching . Wrist flexion and extension intact   BLE 3/5 proximally and 4/5 distally   BLE in ace wraps[FI1.3]   HEENT NC AT PRTL EOM good   Neck supple[FI1.1]  Cognitively she is intact, good insight and executive functioning[FI1.3]          REVIEWED THE MEDICATIONS BELOW   Current Facility-Administered Medications   Medication     ondansetron (ZOFRAN) tablet 4 mg     prochlorperazine (COMPAZINE) injection 5-10 mg     ondansetron (ZOFRAN) injection 4 mg     pantoprazole (PROTONIX) EC tablet 40 mg     simethicone (MYLICON) chewable tablet 80 mg     levofloxacin (LEVAQUIN) infusion 500 mg     dextrose 10 % 1,000 mL infusion     lidocaine 1 % 0.5-5 mL     lidocaine (LMX4) kit     sodium chloride (PF) 0.9% PF flush 5-50 mL     heparin lock flush 10 UNIT/ML injection 2-5 mL     apixaban ANTICOAGULANT (ELIQUIS) tablet 2.5 mg     ascorbic acid (VITAMIN C) tablet 500 mg     atenolol (TENORMIN) tablet 50 mg     beta carotene capsule 25,000 Units     cetirizine (zyrTEC) tablet 10 mg     ertapenem (INVanz) 1 g vial to attach to  mL bag     folic acid (FOLVITE) tablet 1 mg     gabapentin (NEURONTIN) capsule 200 mg     melatonin tablet 3 mg     morphine 0.1% in solosite topical gel 2 g     multivitamin, therapeutic with " minerals (THERA-VIT-M) tablet 1 tablet     triamcinolone (KENALOG) 0.1 % ointment     vancomycin (VANCOCIN) 1,500 mg in NaCl 0.9 % 250 mL intermittent infusion     zinc sulfate (ZINCATE) capsule 220 mg     lidocaine 1 % 1 mL     lidocaine (LMX4) kit     sodium chloride (PF) 0.9% PF flush 3 mL     sodium chloride (PF) 0.9% PF flush 3 mL     medication instruction     acetaminophen (TYLENOL) tablet 650 mg     acetaminophen (TYLENOL) Suppository 650 mg     Patient is already receiving anticoagulation with heparin, enoxaparin (LOVENOX), warfarin (COUMADIN)  or other anticoagulant medication     glucose 40 % gel 15-30 g    Or     dextrose 50 % injection 25-50 mL    Or     glucagon injection 1 mg     insulin aspart (NovoLOG) inj (RAPID ACTING)     insulin aspart (NovoLOG) inj (RAPID ACTING)     naloxone (NARCAN) injection 0.1-0.4 mg     calcium-vitamin D (CALTRATE) 600-400 MG-UNIT per tablet 2 tablet     co-enzyme Q-10 capsule 30 mg     morphine 0.1% in solosite topical gel     potassium chloride SA (K-DUR/KLOR-CON M) CR tablet 20-40 mEq     potassium chloride (KLOR-CON) Packet 20-40 mEq     potassium chloride 10 mEq in 100 mL intermittent infusion     potassium chloride 10 mEq in 100 mL intermittent infusion with 10 mg lidocaine     potassium chloride 20 mEq in 50 mL intermittent infusion     magnesium sulfate 2 g in NS intermittent infusion ([facility] or [facility] Cmpd)     magnesium sulfate 4 g in 100 mL sterile water (premade)     sodium phosphate 10 mmol in D5W intermittent infusion     sodium phosphate 15 mmol in D5W intermittent infusion     sodium phosphate 20 mmol in D5W intermittent infusion     sodium phosphate 25 mmol in D5W intermittent infusion     melatonin tablet 1 mg     traMADol (ULTRAM) half-tab 25 mg     predniSONE (DELTASONE) tablet 10 mg       Results for orders placed or performed during the hospital encounter of 06/19/17 (from the past 24 hour(s))   Social Work IP Consult    Narrative    Annita  ANDIE Alonzo     2017  3:55 PM  Social Work: Assessment with Discharge Plan    Patient Name:  Fidelia Michel  :  1960  Age:  56 year old  MRN:  [id number]  Risk/Complexity Score:  Filed Complexity Screen Score: 8  Completed assessment with:  Pt and spouse at bedside    Presenting Information   Reason for Referral:  Discharge plan, Return to ARU  Date of Intake:  2017  Referral Source:  Chart Review  Decision Maker:  Pt when able  Alternate Decision Maker:  Spouse  Health Care Directive:  Declined completing today  Living Situation:  House  Previous Functional Status:  Independent  Patient and family understanding of hospitalization:  Pt states   she is wanting to return to ARU after getting her medical cares   resolved.  Cultural/Language/Spiritual Considerations:  None reported for   today.  Adjustment to Illness:  Pt is in a positive mood and continues to   be motivated for ARU.    Physical Health  Reason for Admission:  No diagnosis found.  Services Needed/Recommended:  ARU    Mental Health/Chemical Dependency  Diagnosis:  NA  Support/Services in Place:  NA  Services Needed/Recommended:  NA    Support System  Significant relationship at present time:  Spouse at bedside  Family of origin is available for support:  Yes  Other support available:  Yes  Gaps in support system:  None reported  Patient is caregiver to:  None     Provider Information   Primary Care Physician:  Comfort Sabillon   [phone]   Clinic:  [facility] [address] / [facility]   [address]      :  SHELBY    Financial   Income Source:  Retired  Financial Concerns:  None  Insurance:    Payor/Plan Subscriber Name Rel Member # Group #   PREFERREDONE - PEAK A* FIDELIA MICHEL  [id number]       [id number]       Discharge Plan   Patient and family discharge goal:  Return to ARU  Provided education on discharge plan:  YES  Patient agreeable to discharge plan:  YES  A list of Medicare Certified  Facilities was provided to the   patient and/or family to encourage patient choice. Patient's   choices for facility are:    [facility]  *3277    Will NH provide Skilled rehabilitation or complex medical:  YES  General information regarding anticipated insurance coverage and   possible out of pocket cost was discussed. Patient and patient's   family are aware patient may incur the cost of transportation to   the facility, pending insurance payment: YES  Barriers to discharge:  Medical stability    Discharge Recommendations   Anticipated Disposition:  Facility:  ARU, referral started with   admission  Transportation Needs:  Medical:  Wheelchair  Name of Transportation Company and Phone:  [employer]     Additional comments   SW met with pt and spouse to complete new assessment as pt was a   recent discharge from [facility] to ARU.  Pt states that she would like   to return to ARU and is motivated to continue therapies.  Pt and   spouse report no change from prior assessment.  Pt states she is   agreeable to another PMR consult as this may be needed for an   insurance auth to return to ARU.  SW discussed referral with    admissions liaison who will be working with insurance for   readmission.  SW to follow for return to ARU.    DENNIS Larsen, APSW  [address]   Phone: [phone]  Pager: [phone]  Unit: [phone]       Glucose by meter   Result Value Ref Range    Glucose 134 (H) 70 - 99 mg/dL   Basic metabolic panel   Result Value Ref Range    Sodium 133 133 - 144 mmol/L    Potassium 4.6 3.4 - 5.3 mmol/L    Chloride 105 94 - 109 mmol/L    Carbon Dioxide 19 (L) 20 - 32 mmol/L    Anion Gap 9 3 - 14 mmol/L    Glucose 179 (H) 70 - 99 mg/dL    Urea Nitrogen 39 (H) 7 - 30 mg/dL    Creatinine 0.73 0.52 - 1.04 mg/dL    GFR Estimate 83 >60 mL/min/1.7m2    GFR Estimate If Black >90   GFR Calc   >60 mL/min/1.7m2    Calcium 7.4 (L) 8.5 - 10.1 mg/dL   CBC with platelets differential   Result  Value Ref Range    WBC 2.8 (L) 4.0 - 11.0 10e9/L    RBC Count 3.02 (L) 3.8 - 5.2 10e12/L    Hemoglobin 9.5 (L) 11.7 - 15.7 g/dL    Hematocrit 28.3 (L) 35.0 - 47.0 %    MCV 94 78 - 100 fl    MCH 31.5 26.5 - 33.0 pg    MCHC 33.6 31.5 - 36.5 g/dL    RDW 20.9 (H) 10.0 - 15.0 %    Platelet Count 43 (LL) 150 - 450 10e9/L    Diff Method Automated Method     % Neutrophils 67.4 %    % Lymphocytes 16.8 %    % Monocytes 13.2 %    % Eosinophils 1.8 %    % Basophils 0.4 %    % Immature Granulocytes 0.4 %    Nucleated RBCs 0 0 /100    Absolute Neutrophil 1.9 1.6 - 8.3 10e9/L    Absolute Lymphocytes 0.5 (L) 0.8 - 5.3 10e9/L    Absolute Monocytes 0.4 0.0 - 1.3 10e9/L    Absolute Eosinophils 0.1 0.0 - 0.7 10e9/L    Absolute Basophils 0.0 0.0 - 0.2 10e9/L    Abs Immature Granulocytes 0.0 0 - 0.4 10e9/L    Absolute Nucleated RBC 0.0    Digoxin level   Result Value Ref Range    Digoxin Level 1.8 0.5 - 2.0 ug/L   INR   Result Value Ref Range    INR 1.42 (H) 0.86 - 1.14   Magnesium   Result Value Ref Range    Magnesium 2.0 1.6 - 2.3 mg/dL   Glucose by meter   Result Value Ref Range    Glucose 155 (H) 70 - 99 mg/dL       Total of[FI1.1] 65[FI1.3] min spent in the encounter, more than 50% in coordination and counseling on topics listed in the HPI[FI1.1]        Revision History        User Key Date/Time User Provider Type Action    > FI1.4 6/21/2017 10:59 AM Priscilla Shanks MD Physician Sign     FI1.5 6/21/2017 10:51 AM Priscilla Shanks MD Physician      FI1.3 6/21/2017 10:49 AM Priscilla Shanks MD Physician      FI1.2 6/21/2017  9:31 AM Priscilla Shanks MD Physician      FI1.1 6/21/2017  9:30 AM Priscilla Shanks MD Physician             Consults by Ruby Ariza MSW at 6/20/2017  3:55 PM     Author:  Ruby Ariza MSW Service:  Social Work Author Type:      Filed:  6/20/2017  3:55 PM Date of Service:  6/20/2017  3:55 PM Creation Time:  6/20/2017  3:49 PM    Status:   Signed :  Ruby Ariza MSW ()     Consult Orders:    1. Social Work IP Consult [[id number]] ordered by Selvin Gandara MD at 17 1549                Social Work: Assessment with Discharge Plan    Patient Name:  Fidelia Michel  :  1960  Age:  56 year old  MRN:  [id number]  Risk/Complexity Score:  Filed Complexity Screen Score: 8  Completed assessment with:  Pt and spouse at bedside    Presenting Information   Reason for Referral:  Discharge plan, Return to ARU  Date of Intake:  2017  Referral Source:  Chart Review  Decision Maker:  Pt when able  Alternate Decision Maker:  Spouse  Health Care Directive:  Declined completing today  Living Situation:  House  Previous Functional Status:  Independent  Patient and family understanding of hospitalization:  Pt states she is wanting to return to ARU after getting her medical cares resolved.  Cultural/Language/Spiritual Considerations:  None reported for today.  Adjustment to Illness:  Pt is in a positive mood and continues to be motivated for ARU.    Physical Health  Reason for Admission:  No diagnosis found.  Services Needed/Recommended:  ARU    Mental Health/Chemical Dependency  Diagnosis:  NA  Support/Services in Place:  NA  Services Needed/Recommended:  NA    Support System  Significant relationship at present time:  Spouse at bedside  Family of origin is available for support:  Yes  Other support available:  Yes  Gaps in support system:  None reported  Patient is caregiver to:  None     Provider Information   Primary Care Physician:  Comfort Sabillon   [phone]   Clinic:  [facility] [address] / [facility] [address]      :  SHELBY    Financial   Income Source:  Retired  Financial Concerns:  None  Insurance:    Payor/Plan Subscriber Name Rel Member # Group #   PREFERREDONE - PEAK A* FIDELIA MICHEL  [id number]       [address] [id number]       Discharge Plan   Patient and family discharge  goal:  Return to ARU  Provided education on discharge plan:  YES  Patient agreeable to discharge plan:  YES  A list of Medicare Certified Facilities was provided to the patient and/or family to encourage patient choice. Patient's choices for facility are:    [facility]  *[phone]    Will NH provide Skilled rehabilitation or complex medical:  YES  General information regarding anticipated insurance coverage and possible out of pocket cost was discussed. Patient and patient's family are aware patient may incur the cost of transportation to the facility, pending insurance payment: YES  Barriers to discharge:  Medical stability    Discharge Recommendations   Anticipated Disposition:  Facility:  ARU, referral started with admission  Transportation Needs:  Medical:  Wheelchair  Name of Transportation Company and Phone:  [employer]     Additional comments   SW met with pt and spouse to complete new assessment as pt was a recent discharge from [facility] to ARU.  Pt states that she would like to return to ARU and is motivated to continue therapies.  Pt and spouse report no change from prior assessment.  Pt states she is agreeable to another PMR consult as this may be needed for an insurance auth to return to ARU.  SW discussed referral with [facility] admissions liaison who will be working with insurance for readmission.  SW to follow for return to ARU.    DENNIS Larsen, SETHW  [address] Unit   Phone: [phone]  Pager: [phone]  Unit: [phone][LH1.1]         Revision History        User Key Date/Time User Provider Type Action    > LH1.1 6/20/2017  3:55 PM Ruby Ariza MSW  Sign                     Progress Notes - Physician (Notes for yesterday and today)      Progress Notes by Shyann Marinelli PA-C at 8/3/2017 12:31 PM     Author:  Shyann Marinelli PA-C Service:  Hem/Onc Author Type:  Physician Assistant - C    Filed:  8/3/2017  1:06 PM Date of Service:  8/3/2017 12:31 PM Creation  Time:  8/3/2017 12:31 PM    Status:  Attested :  Shyann Marinelli PA-C (Physician Assistant - C)    Cosigner:  Katarina Ospina MD at 8/3/2017  6:14 PM        Attestation signed by Katarina Ospina MD at 8/3/2017  6:14 PM        Attestation:  Physician Attestation   IKatarina, saw and evaluated Amelia Michel as part of a shared visit.  I have reviewed and discussed with the advanced practice provider their history, physical and plan.    I personally reviewed the vital signs, medications, labs and imaging.    My key history or physical exam findings: Patient had a good night last night with fewer issues with her heart rhythm and states that she feels better in terms of her general energy. He has not noted significant change in her volume status. She is feeling stronger today and was sitting up in the chair if since morning and I saw her in the afternoon and was still feeling well. She has questions about lifevest and wanted to clarify that before any change in her status in terms of hospitalization/discharge    Key management decisions made by me: Switch on the medications to oral as able including Lasix give high-dose potassium right now and recheck at 4 PM to assess the dose that can be done at acute rehabilitation unit.  Follow up with cardiology team to discuss life rest and also any further recommendations regarding her arrhythmias.   Continue with filgrastim.   Hopefully discharged to acute rehabilitation unit tomorrow.   I spent 35 minutes in the care of this patient >50% of which was spent in coordinating and counseling.        Katarina Ospina  Date of Service (when I saw the patient): 08/03/17                               [facility], [place]    Hematology / Oncology Progress Note    Date of Service (when I saw the patient): 08/03/2017     Assessment & Plan   Amelia Michel is a 56 year old female who was admitted on 6/19/2017. She has complex past medical history of  prior VSD repair, rheumatoid arthritis on long-term methotrexate, recent dx atrial fibrillation/flutter/SVT who was recently admitted to [facility] on 5/29 after an out of hospital cardiac arrest, admission complicated by intubation with discharge to TCU. Was discharged to TCU and subsequently readmitted to Cardiology service for FUO. With workup of fever and progressive cytopenias, diagnosis of DLBCL was made. She was transferred to the Hematology service on 7/26. See discussions of diagnosis and reasoning for treatment plan in 7/27 Heme/Onc note. Started Cycle 1 R-EPOCH on 7/28.     HEME:    #DLBCL. Stage 4 with bone marrow and liver involvement. IPI 4. Possibly related to prior methotrexate.   - PET/CT 7/18 revealing for mediastinal and neck level 2A lymphadenopathy, extensive FDG-avid bony lesions, hepatosplenic FDG-avid lesions, pelvic lesions contingous with the uterus, and bilateral pleural effusions.    - Bone marrow biopsy 7/12: scattered plasma cells in perivascular clusters, with no definitive staining of B-cell population by CD5; on re-review, possibly consistent with intravascularge large B-cell lymphoma. However, liver biopsy (7/21) consistent with DLBCL, negative for BCL2/BCL6/MYC rearrangements, CD20 positive.   - PICC line placed  - Completed Cycle 1 of dose adjusted R-EPOCH (Day 1=7/28/17). Tolerated well overall, except for reaction to Rituxan (hypotension with BP 87/45, SOB/feeling of ?throat swelling/difficulty getting breath in; then fever/rigors). Today is Day 7.  -Continue daily Neupogen (starting D6, 8/2)    #Hyperuricemia  #Monitor for TLS.  Uric acid up to 8.2 (7/27 PM), s/p Rasburicase. Uric acid improved to 4.2. Continue Allopurinol. Daily labs given recent stability. Back off on frequency of Mg/Phos/uric acid when appropriate after chemotherapy.     #DIC. INR prolonged, now improving. Fibrinogen currently ok.   - conditional transfusion parameters in place: 2U plasma for INR >1.8, 5U cryo for  fibrinogen <150.   - started Vitamin K 10mg PO x 3 days (7/28-7/30)  - adjust coag monitoring to qMWF x 4 occurrences; adjust if needed    #Pancytopenia, secondary to DLBCL  - transfuse to maintain Hgb >8.0 (keep given recent cardiac history), platelets >10,000    ID:   #[id number]  - continue ppx ACV, Levaquin, Fluconazole     #Persistent Fevers. -- Resolved.  #Lactic acidosis. -- Resolved.  Previously followed by ID, who signed off 7/21.  Extensive infectious evaluation including blood cultures, which remain NGTD. Additionally, stool virus panel, M. Tuberculsosis, tick-borne illnesses, and bartonella/Q-fever have all been negative. Fevers/lactate secondary to malignancy.   - possible pneumonia while recently on meropenem (7/1-7/5);  CT chest (7/13) did reveal a right lung tree-in-bud opacities with more consolidative opacities in RLL; but no accompanying focal symptoms. Appears to have received dose of Meropenem on 7/14, now off.   - lactic acid increased to 10.4 (7/28) during Rituxan infusion reaction.   - cultures have been NGTD, avoid additional fluids given hypervolemia, monitor closely    CV/PV:    #Atrial fibrillation/flutter   #Recent out of hospital cardiac arrest (5/29/17)  #Prior VSD repair (1969)  - PET scan suggested FDG-avid lesions leading to thickening of the interatrial septum, extending superiorly along the main pulmonary artery; although cardiac MRI 7/24 shows no evidence of infiltrative disease    - Appreciate Cardiology team recs  - continue Digoxin 125mcg daily. Digoxin level within range on 7/30 AM.   - continue Atenolol 25mg daily (decreased 7/30). Note, per pt/, metoprolol has not worked for her and makes her feel poorly.  PT also notes that she does NOT want amiodarone, as there is concern that this led to her cardiac arrest (and I will look further into this).   - continuous telemetry  - Heart rhythms appear to have stabilized with normalization of electrolytes. Will continue tele for  now.    #Bradycardia. HRs 39-50s (7/28-7/29). On tele strips and formal EKG 7/29 this appeared to be sinus shelton. D/w Cards team who agree with decreasing atenolol dose. Checked dig level as above. Cardiology team continues to follow.      #Aortic masses. BRE on 7/14 showed 3 small masses on the coronary cusps and LVOT. Do not believe this to be endocarditis; no antibiotics at this time. Unclear what this represents; possibly could be Libmann-Sacks.      #Anasarca, volume overload  - Torsemide 40 mg BID with additional PRN. Per Cards (7/28): changed diuretic to Lasix gtt at 5mg/hr given fluids with CIVI chemo (which is generally around 70cc/hr). This may also help with additional diuresis with increased blood product needs.[MC1.1]   - Lasix gtt stopped 8/2 and transitioned to IV pushes. Transitioned to oral Lasix 40mg BID today (decreased dose as she is not getting as much fluids with chemo anymore). Will monitor I&Os today and overnight. Plan to d/c on Lasix 40mg BID vs daily based on I&Os.[MC1.2]   - Midodrine to aid in renal perfusion -- will continue to hold midodrine while her blood pressures are elevated. May resume when BPs normalize.  - 2g Na limit  - Continue spironolactone 12.5mg     GI:   #Nausea, mild.   - will now have scheduled antiemetic (Zofran, Emend)) with chemo, PRN Compazine/Ativan    #LFT derangement. 2/2 lymphoma involvement. ALT/AST significantly improved from prior. Alk phos more elevated today, but will monitor for fluctuations. Tbili has been elevated (chemo adjusted), now downtrending.   - monitor daily through chemo, then adjust frequency as indicated    #Constipation vs. diarrhea. Stools harder on 7/28. With scheduled laxatives, pt developed looser stools. Backed off on scheduled bowel regimen. Stools now soft.     NEURO:  #Saddle anesthesia, urinary incontinence. Pt reports this is baseline for her. Unclear etiology. Was initially seen in ED and evaluated by NSG in 03/2016 for this  without clear explanation for symptoms. She has been noted to have an S1 radiculopathy. Seen by Neuro in 06/2017, formal consult placed while here to follow-up. Suggested could do urodynamic study on outpatient basis, also recommended looking for disease in CSF, which is also part of chemo plan.    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 HOLDING PTA Prednisone with plan for higher dose steroids on treatment plan. Resume 5mg dose as of 8/4.   - Follow-up with [facility] Dr. Conor Cunha in 4-6 weeks after discharge    ENDO:  #Steroid induced hyperglycemia. Added QID BG check and med SSI.     DERM:   #Extravasation-related LUE wound. Slowly improving.   - WOCN following, appreciate recs    GEN:  #Deconditioning. 2/2 prolonged hospital stays, acute illness/malignancy  - PT/OT.   - PM&R eval completed. Ok for d/c to ARU once medically ready.     FEN  - 2g Na diet  - Dietician following    #Hypokalemia. 2/2 lasix. Started PO supplement in addition to sliding scale (currently high given cardiac issues/acute illness).   - K+ on PM check yesterday was 4.6, therefore scheduled IV K+ replacement of 20mEq q4h was stopped.   - Will give K+ 40mEq IV this morning (K+ 4.2 on AM labs). On PM check will give additional K+ if needed.  - Continue high sliding scale replacement, only IV.   - Continue BID BMP lab checks.     PPx:   - VTE: defer VTE pharmacologic ppx due to thrombocytopenia  - GI: continue protonix    CODE: Full Code    Disposition: Inpatient hospitalization until electrolyte and diuresis regimen stabilized. Will d/c to ARU.      Shyann Marinelli PA-C  Hematology/Oncology  Pager: [phone]    Interval History[MC1.1]   Amelia constantino  feeling well today. She has already gotten washed up and is breakfast. She says she is feeling stronger everyday. Has not noticed major issues since ending chemo. Discussed plan to d/c to ARU and timing of next cycle of chemo. Amelia denies chest pain, SOB, n/v/d/c.[MC1.2]    Physical Exam   Temp: 96.8  F (36  C) Temp src: Axillary BP: 150/83 Pulse: 54 Heart Rate: 58 Resp: 14 SpO2: 97 % O2 Device: None (Room air)    Vitals:    08/01/17 0741 08/02/17 0719 08/03/17 0834   Weight: 67.4 kg (148 lb 9.6 oz) 66.5 kg (146 lb 8 oz) 65.6 kg (144 lb 11.2 oz)     Vital Signs with Ranges  Temp:  [96.5  F (35.8  C)-98.5  F (36.9  C)] 96.8  F (36  C)  Pulse:  [51-54] 54  Heart Rate:  [55-64] 58  Resp:  [12-20] 14  BP: (133-151)/(68-83) 150/83  SpO2:  [96 %-97 %] 97 %  I/O last 3 completed shifts:  In: 2050 [P.O.:1280; I.V.:670]  Out: 4100 [Urine:4100]  Constitutional: Pleasant and cooperative female seen sitting up in a chair[MC1.1] and eating breakfast[MC1.2]. Awake, alert, NAD.  HEENT: NC/AT, EOMI, sclera clear, conjunctiva normal, OP with MMM,[MC1.1] lower gumline lesion is improved, hardly visible[MC1.2]  Respiratory: No increased work of breathing, CTAB, no crackles or wheezing.  Cardiovascular:[MC1.1] Regular[MC1.2] rhythm, rate controlled, no murmur noted. 1+ pitting edema in hips, LE edema improved with compression stockings.  GI: Normal bowel sounds, soft, non-distended and non-tender.  Skin: Warm, dry, well-perfused. No bruising, bleeding, rashes, or lesions on limited exam.  Neurologic: A&O. Answers questions appropriately. Moves all extremities spontaneously.  Neuropsychiatric: Calm, appropriate affect    Medications     - MEDICATION INSTRUCTIONS -       NaCl       IV fluid REPLACEMENT ONLY       - MEDICATION INSTRUCTIONS -         potassium chloride  20 mEq Intravenous Q1H     furosemide  40 mg Oral BID     spironolactone  12.5 mg Oral Daily     insulin aspart  1-10 Units Subcutaneous TID AC     insulin aspart  1-7  Units Subcutaneous At Bedtime     atenolol  25 mg Oral Daily     melatonin  1-3 mg Oral At Bedtime     allopurinol  300 mg Oral Daily     acyclovir  400 mg Oral BID     fluconazole  200 mg Oral Daily     filgrastim (NEUPOGEN/GRANIX) intravenous  5 mcg/kg (Treatment Plan Recorded) Intravenous Daily at 8 pm     sodium chloride (PF)  10 mL Intracatheter Q7 Days     [START ON 8/4/2017] predniSONE  5 mg Oral Daily     morphine 0.1% in solosite  2 g Transdermal Daily     sodium chloride (PF)  1-74 mL Intracatheter Q8H     vitamin  B-1  50 mg Oral Daily     folic acid  1 mg Oral Daily     miconazole   Topical BID     pantoprazole  40 mg Oral QAM     digoxin  125 mcg Oral Daily     sodium chloride (PF)  10 mL Intracatheter Q8H     gabapentin  200 mg Oral TID     multivitamin, therapeutic with minerals  1 tablet Oral Daily     calcium-vitamin D  2 tablet Oral Daily       Data   Results for orders placed or performed during the hospital encounter of 06/19/17 (from the past 24 hour(s))   Basic metabolic panel   Result Value Ref Range    Sodium 134 133 - 144 mmol/L    Potassium 3.7 3.4 - 5.3 mmol/L    Chloride 92 (L) 94 - 109 mmol/L    Carbon Dioxide 34 (H) 20 - 32 mmol/L    Anion Gap 8 3 - 14 mmol/L    Glucose 297 (H) 70 - 99 mg/dL    Urea Nitrogen 30 7 - 30 mg/dL    Creatinine 0.60 0.52 - 1.04 mg/dL    GFR Estimate >90  Non  GFR Calc   >60 mL/min/1.7m2    GFR Estimate If Black >90   GFR Calc   >60 mL/min/1.7m2    Calcium 8.3 (L) 8.5 - 10.1 mg/dL   Magnesium   Result Value Ref Range    Magnesium 2.3 1.6 - 2.3 mg/dL   Cryoprecipitate prepare order unit conditional   Result Value Ref Range    Ordered Component Type Cryoprecipitate     Units Ordered 5    Blood component   Result Value Ref Range    Unit Number [id number]     Blood Component Type 5 cryoprecipitate units pooled     Division Number 00     Status of Unit Released to care unit     Blood Product Code [id number]     Unit Status ISS     Glucose by meter   Result Value Ref Range    Glucose 289 (H) 70 - 99 mg/dL   Glucose by meter   Result Value Ref Range    Glucose 291 (H) 70 - 99 mg/dL   Potassium   Result Value Ref Range    Potassium 4.6 3.4 - 5.3 mmol/L   Glucose by meter   Result Value Ref Range    Glucose 292 (H) 70 - 99 mg/dL   Basic metabolic panel   Result Value Ref Range    Sodium 135 133 - 144 mmol/L    Potassium 4.2 3.4 - 5.3 mmol/L    Chloride 94 94 - 109 mmol/L    Carbon Dioxide 36 (H) 20 - 32 mmol/L    Anion Gap 5 3 - 14 mmol/L    Glucose 199 (H) 70 - 99 mg/dL    Urea Nitrogen 30 7 - 30 mg/dL    Creatinine 0.54 0.52 - 1.04 mg/dL    GFR Estimate >90  Non  GFR Calc   >60 mL/min/1.7m2    GFR Estimate If Black >90   GFR Calc   >60 mL/min/1.7m2    Calcium 8.2 (L) 8.5 - 10.1 mg/dL   CBC with platelets differential   Result Value Ref Range    WBC 3.0 (L) 4.0 - 11.0 10e9/L    RBC Count 2.85 (L) 3.8 - 5.2 10e12/L    Hemoglobin 8.6 (L) 11.7 - 15.7 g/dL    Hematocrit 26.1 (L) 35.0 - 47.0 %    MCV 92 78 - 100 fl    MCH 30.2 26.5 - 33.0 pg    MCHC 33.0 31.5 - 36.5 g/dL    RDW 25.4 (H) 10.0 - 15.0 %    Platelet Count 19 (LL) 150 - 450 10e9/L    Diff Method Manual Differential     % Neutrophils 93.9 %    % Lymphocytes 6.1 %    % Monocytes 0.0 %    % Eosinophils 0.0 %    % Basophils 0.0 %    Absolute Neutrophil 2.8 1.6 - 8.3 10e9/L    Absolute Lymphocytes 0.2 (L) 0.8 - 5.3 10e9/L    Absolute Monocytes 0.0 0.0 - 1.3 10e9/L    Absolute Eosinophils 0.0 0.0 - 0.7 10e9/L    Absolute Basophils 0.0 0.0 - 0.2 10e9/L    Anisocytosis Marked     Poikilocytosis Slight     RBC Fragments Slight     Stomatocytes Slight     Platelet Estimate Confirming automated cell count    Lactate Dehydrogenase   Result Value Ref Range    Lactate Dehydrogenase 293 (H) 81 - 234 U/L   Magnesium   Result Value Ref Range    Magnesium 2.4 (H) 1.6 - 2.3 mg/dL   Phosphorus   Result Value Ref Range    Phosphorus 2.3 (L) 2.5 - 4.5 mg/dL   Hepatic panel    Result Value Ref Range    Bilirubin Direct 0.6 (H) 0.0 - 0.2 mg/dL    Bilirubin Total 1.2 0.2 - 1.3 mg/dL    Albumin 2.9 (L) 3.4 - 5.0 g/dL    Protein Total 5.7 (L) 6.8 - 8.8 g/dL    Alkaline Phosphatase 234 (H) 40 - 150 U/L    ALT 91 (H) 0 - 50 U/L    AST 69 (H) 0 - 45 U/L[MC1.1]          Revision History        User Key Date/Time User Provider Type Action    > MC1.2 8/3/2017  1:06 PM Shyann Marinelli PA-C Physician Assistant - TAMMY Sign     MC1.1 8/3/2017 12:31 PM Shyann Marinelli PA-C Physician Assistant - C                      Procedure Notes      Procedures by Fox Salinas MD at 7/12/2017  6:03 PM     Author:  Fox Salinas MD Service:  Hem/Onc Author Type:  Fellow    Filed:  7/12/2017  6:11 PM Date of Service:  7/12/2017  6:03 PM Creation Time:  7/12/2017  6:02 PM    Status:  Attested :  Fox Salinas MD (Fellow)    Cosigner:  Berlin Silverman MD at 7/20/2017  2:10 PM         Pre-procedure Diagnoses:    1. Pancytopenia (H) [D61.818]           Post-procedure Diagnoses:    1. Pancytopenia (H) [D61.818]           Procedures:    1. BONE MARROW ASPIRATE &BIOPSY [[id number]]          Attestation signed by Berlin Silverman MD at 7/20/2017  2:10 PM        Attestation:  Physician Attestation   I spent a total of 60 minutes with the patient, personally observing the resident as he performed the bone marrow aspirate and biopsies.     Key findings: pancytopenia.    Berlin Silverman  Date of Service (when I saw the patient): 7/12/17                               [facility]  Procedure Note               Bone Marrow Biopsy or Aspiration:       Amelia Michel  MRN# [id number]    Diagnosis or Indication: Pancytopenia      Time-out was called to confirm: patient s name and date of birth, procedure, side and site of biopsy, safety procedures followed.    - Performed by: self.    -[AK1.1] Supervising[AK1.2] Attending : Dr. Silverman  -  Informed consent: signed by patient.    - Aspiration and biopsy site: [left] superior posterior iliac crest.   - Patient position: [right lateral decubitus]  - Preparation and technique: sterile preparation of site with Chloraprep, draped to expose aspirate/biopsy area, local anesthesia with 1% lidocaine (approximately 10ml), frequent pressure application on incision to maintain hemostasis.    - Tissue obtained: bone marrow aspirate and biopsy were successfully obtained in sterile manner.  - Toleration of procedure and any complications: slight localized bleeding (<5ml). Patient tolerated procedure well with minimal pain.      Premedication per physician order (see patient's chart).     {   How many sites?                    : Lt iliac crest[AK1.1]                                  Revision History        User Key Date/Time User Provider Type Action    > [N/A] 7/12/2017  6:11 PM Fox Salinas MD Fellow Sign     AK1.2 7/12/2017  6:10 PM Fox Salinas MD Fellow Sign     AK1.1 7/12/2017  6:02 PM Fox Salinas MD Fellow             Procedures by Charly Hamilton MD at 6/26/2017  6:23 PM     Author:  Charly Hamilton MD Service:  Cardiology Author Type:  Fellow    Filed:  6/26/2017  6:31 PM Date of Service:  6/26/2017  6:23 PM Creation Time:  6/26/2017  6:23 PM    Status:  Cosign Needed :  Charly Hamilton MD (Fellow)    Cosign Required:  Yes             PRELIMINARY REPORT:     PROCEDURE: Right heart catheterization    PHYSICIANS:  Attending Cardiology Staff: Lee Samuels MD  Cardiology Fellow: Charly Hamilton MD     HPI:  Amelia Michel is a 56 year old female with history including recent cardiac arrest with prolonged hospital course now with hypotension who presents on an hemodynamic assessment to evaluate hypotension.     DESCRIPTION:   Venous Location: right internal jugular vein   Access: Local anesthetic with lidocaine.  A micropuncture (21 g) needle with  ultrasound guidance was used to establish venous access.  Venous Sheath:7F standard sheath   Catheters:  7F Troy Coby PA catheter    Right Heart Catheterization:  BP 88/67/74    BSA 1.9    RA 14/14/12   RV 37/14  PA  36/23/27  PCW 17/18/15   Prabha CO 4.3   Prabha CI 2.4   TD CO 4.0   TD CI 2.2   PA sat 53.0%   Hgb 7.2 g/dL   PVR 2.8  TPR 6.2    COMPLICATIONS:   None    IMPRESSION:   1.Increased right-sided and increased left-sided filling pressures.   2. Mild pulmonary hypertension with a mean pulmonary artery pressure of[BO1.1] 27[BO1.2] mmHg.  3. Decreased cardiac output ([BO1.1]4.3[BO1.2] L/min) and cardiac index ([BO1.1]2.4[BO1.2])    PLAN:   1.[BO1.1] Continued management of heart failure and hypotension per inpatient CARDS I team.  2.[BO1.2] Continued medical management for cardiovascular risk factor optimization.    Findings discussed with[BO1.1] Cards I fellow Dr. Og.[BO1.2]    See CVIS report for final draft.      Charly Hamilton MD  Cardiovascular Disease Fellow  [phone][BO1.1]       Revision History        User Key Date/Time User Provider Type Action    > BO1.2 6/26/2017  6:31 PM Charly Hamilton MD Fellow Sign     BO1.1 6/26/2017  6:23 PM Charly Hamilton MD Fellow                   Progress Notes - Therapies (Notes from 08/01/17 through 08/04/17)     No notes of this type exist for this encounter.                                          INTERAGENCY TRANSFER FORM - LAB / IMAGING / EKG / EMG RESULTS   6/19/2017                       UNIT 5C BMT [facility]: [phone]            Unresulted Labs (24h ago through future)    Start       Ordered    08/02/17 1600  Basic metabolic panel  EVERY 12 HOURS,   Timed      08/02/17 0859    08/02/17 1600  Magnesium  EVERY 12 HOURS,   Timed      08/02/17 0859    07/31/17 0400  Basic metabolic panel  AM DRAW,   Routine      07/30/17 1221    07/31/17 0400  CBC with platelets differential  AM DRAW,   Routine     Comments:  Last Lab Result:  Hemoglobin (g/dL)       Date                     Value                 07/27/2017               8.1 (L)          ----------    07/30/17 1221    07/31/17 0400  Fibrinogen activity  (INR PTT Fibrinogen Panel)  EVERY MWF,   Routine      07/30/17 1221    07/31/17 0400  INR  (INR PTT Fibrinogen Panel)  EVERY MWF,   Routine      07/30/17 1221    07/31/17 0400  Magnesium  AM DRAW,   Routine      07/30/17 1221    07/31/17 0400  Partial thromboplastin time  (INR PTT Fibrinogen Panel)  EVERY MONDAY,   Routine      07/30/17 1221    07/31/17 0400  Phosphorus  AM DRAW,   Routine      07/30/17 1221    07/30/17 0400  Hepatic panel  AM DRAW,   Routine      07/29/17 0715    Unscheduled  Platelets prepare order unit conditional  (Conditional Platelet  Units)  CONDITIONAL (SPECIFY) BLOOD,   Routine     Comments:  For patients with significant heart failure: Recommend transfusing slowly using the 4 hour allowed time period, if clinically appropriate.  Do not change or add to this text.  Use additional instructions field.   Question Answer Comment   Number of Doses 1    When to transfuse a) Platelet count 10,000/uL or less    Special Requirements a) None    Transfusion duration per dose Infuse within 4 hours of release        07/27/17 1444    Unscheduled  Plasma prepare order unit conditional  (Conditional Plasma Units)  CONDITIONAL (SPECIFY) BLOOD,   Routine     Comments:  For patients with significant heart failure:  Recommend transfusing slowly using the 4 hour allowed time period, if clinically appropriate.  Do NOT change or add to this text.  Use additional instructions field.   Question Answer Comment   Dose Type Adult    Number of Units 1    When to transfuse a) INR greater than 1.8 (coagulopathy)    Transfusion duration per unit (hrs): Infuse within 4 hours of release        07/27/17 1444    Unscheduled  Cryoprecipitate prepare order unit conditional  (Conditional Cryoprecipitate Unit )  CONDITIONAL (SPECIFY) BLOOD,   Routine    "  Comments:  Do NOT add text to this field. Use additional instructions field.   Question Answer Comment   Number of Units 5    When to transfuse b) Other; specify in comment field to right    Transfusion duration per unit (hrs): Infuse within 4 hours of release    Additional instructions For fibrinogen <150        07/27/17 1444    Unscheduled  Red blood cell prepare order unit conditional  (Conditional Red Blood Cells Unit)  CONDITIONAL (SPECIFY) BLOOD,   Routine     Comments:  For patients with significant heart failure:  Recommend transfusing slowly using the 4 hour allowed time period, if clinically appropriate.  Do NOT change or add to this text.  Use additional instructions field.   Question Answer Comment   Irradiation Indication Not irradiated    Red Blood Cells: NON-Irradiated    Number of Units 1    When to transfuse Hemoglobin is 8 g/dL or less (anemia)    Special Requirements None    Infusion Duration Infuse within 4 hours of release        07/27/17 1852    Unscheduled  Potassium  (Potassium Replacement - \"High\" - Replacement for all levels less than 4.1 mmol/L - UU,UR,UA,RH,SH,PH,WY )  CONDITIONAL (SPECIFY),   Routine     Comments:  Obtain Potassium Level for these conditions:  *IF no potassium result within 24 hrs before initiation of order set, draw potassium level with next lab collect.    *2 HOURS AFTER last IV potassium replacement dose and 4 hours after an oral replacement dose when potassium replacement given for level less than 3.4.  *Next morning after potassium dose.     Repeat Potassium Replacement if necessary.    08/02/17 0858    Unscheduled  Magnesium  (Magnesium Replacement - Adult - \"High\" - Replacement for all levels less than or equal to 2 mg/dL)  CONDITIONAL (SPECIFY),   Routine     Comments:  Obtain Magnesium Level for these conditions:  *IF no magnesium result within 24 hrs before initiation of order set, draw magnesium level with next lab collect.    *2 HOURS AFTER last magnesium " replacement dose when magnesium replacement given for level less than 1.6  *Next morning after magnesium dose.     Repeat Magnesium Replacement if necessary.    08/02/17 0858         Lab Results - 3 Days      Hepatic panel [[id number]] (Abnormal)  Resulted: 08/04/17 0557, Result status: Final result    Ordering provider: Kalpana Wren PA-C  08/04/17 0204 Resulting lab: [facility]    Specimen Information    Type Source Collected On     08/04/17 0204          Components       Value Reference Range Flag Lab   Bilirubin Direct 0.6 0.0 - 0.2 mg/dL H 51   Bilirubin Total 1.2 0.2 - 1.3 mg/dL  51   Albumin 3.1 3.4 - 5.0 g/dL L 51   Protein Total 5.9 6.8 - 8.8 g/dL L 51   Alkaline Phosphatase 232 40 - 150 U/L H 51    0 - 50 U/L H 51   AST 75 0 - 45 U/L H 51            CBC with platelets differential [[id number]] (Abnormal)  Resulted: 08/04/17 0317, Result status: Final result    Ordering provider: Kalpana Wren PA-C  08/03/17 2200 Resulting lab: [facility]    Specimen Information    Type Source Collected On   Blood  08/04/17 0204          Components       Value Reference Range Flag Lab   WBC 1.6 4.0 - 11.0 10e9/L L 51   RBC Count 2.75 3.8 - 5.2 10e12/L L 51   Hemoglobin 8.1 11.7 - 15.7 g/dL L 51   Hematocrit 25.2 35.0 - 47.0 % L 51   MCV 92 78 - 100 fl  51   MCH 29.5 26.5 - 33.0 pg  51   MCHC 32.1 31.5 - 36.5 g/dL  51   RDW 24.7 10.0 - 15.0 % H 51   Platelet Count 15 150 - 450 10e9/L LL 51   Comment:  .  Consistent with previous critical result     Diff Method Manual Differential   51   % Neutrophils 86.8 %  51   % Lymphocytes 13.2 %  51   % Monocytes 0.0 %  51   % Eosinophils 0.0 %  51   % Basophils 0.0 %  51   Absolute Neutrophil 1.4 1.6 - 8.3 10e9/L L 51   Absolute Lymphocytes 0.2 0.8 - 5.3 10e9/L L 51   Absolute Monocytes 0.0 0.0 - 1.3 10e9/L  51   Absolute Eosinophils 0.0 0.0 - 0.7 10e9/L  51   Absolute Basophils 0.0 0.0 -  0.2 10e9/L  51   Anisocytosis Marked   51   Poikilocytosis Slight   51   Ovalocytes Slight   51   Macrocytes Present   51            Fibrinogen activity [[id number]] (Abnormal)  Resulted: 08/04/17 0255, Result status: Final result    Ordering provider: Kalpana Wren PA-C  08/03/17 2200 Resulting lab: [facility]    Specimen Information    Type Source Collected On   Blood  08/04/17 0204          Components       Value Reference Range Flag Lab   Fibrinogen 145 200 - 420 mg/dL L 51            Lactate Dehydrogenase [[id number]] (Abnormal)  Resulted: 08/04/17 0252, Result status: Final result    Ordering provider: Kalpana Wren PA-C  08/03/17 2200 Resulting lab: [facility]    Specimen Information    Type Source Collected On   Blood  08/04/17 0204          Components       Value Reference Range Flag Lab   Lactate Dehydrogenase 314 81 - 234 U/L H 51            Magnesium [[id number]]  Resulted: 08/04/17 0252, Result status: Final result    Ordering provider: Kalpana Wren PA-C  08/03/17 2200 Resulting lab: [facility]    Specimen Information    Type Source Collected On   Blood  08/04/17 0204          Components       Value Reference Range Flag Lab   Magnesium 2.2 1.6 - 2.3 mg/dL  51            Phosphorus [[id number]]  Resulted: 08/04/17 0252, Result status: Final result    Ordering provider: Kalpana Wren PA-C  08/03/17 2200 Resulting lab: [facility]    Specimen Information    Type Source Collected On   Blood  08/04/17 0204          Components       Value Reference Range Flag Lab   Phosphorus 3.1 2.5 - 4.5 mg/dL  51            Uric acid [[id number]]  Resulted: 08/04/17 0252, Result status: Final result    Ordering provider: Kalpana Wren PA-C  08/03/17 2200 Resulting lab: [facility]    Specimen  Information    Type Source Collected On   Blood  08/04/17 0204          Components       Value Reference Range Flag Lab   Uric Acid 3.5 2.6 - 6.0 mg/dL  51            Basic metabolic panel [[id number]] (Abnormal)  Resulted: 08/04/17 0252, Result status: Final result    Ordering provider: Kalpana Wren PA-C  08/03/17 2200 Resulting lab: [facility]    Specimen Information    Type Source Collected On   Blood  08/04/17 0204          Components       Value Reference Range Flag Lab   Sodium 131 133 - 144 mmol/L L 51   Potassium 3.8 3.4 - 5.3 mmol/L  51   Chloride 89 94 - 109 mmol/L L 51   Carbon Dioxide 36 20 - 32 mmol/L H 51   Anion Gap 6 3 - 14 mmol/L  51   Glucose 233 70 - 99 mg/dL H 51   Urea Nitrogen 34 7 - 30 mg/dL H 51   Creatinine 0.51 0.52 - 1.04 mg/dL L 51   GFR Estimate -- >60 mL/min/1.7m2  51   Result:         >90  Non  GFR Calc     GFR Estimate If Black -- >60 mL/min/1.7m2  51   Result:         >90   GFR Calc     Calcium 8.5 8.5 - 10.1 mg/dL  51   Result:              INR [[id number]] (Abnormal)  Resulted: 08/04/17 0252, Result status: Final result    Ordering provider: Kalpana Wren PA-C  08/03/17 2200 Resulting lab: [facility]    Specimen Information    Type Source Collected On   Blood  08/04/17 0204          Components       Value Reference Range Flag Lab   INR 1.42 0.86 - 1.14 H 51            Basic metabolic panel [[id number]] (Abnormal)  Resulted: 08/03/17 1936, Result status: Final result    Ordering provider: New Haas MD  08/03/17 1000 Resulting lab: [facility]    Specimen Information    Type Source Collected On   Blood  08/03/17 1827          Components       Value Reference Range Flag Lab   Sodium 132 133 - 144 mmol/L L 51   Potassium 4.5 3.4 - 5.3 mmol/L  51   Chloride 90 94 - 109 mmol/L L 51   Carbon Dioxide 36 20 - 32 mmol/L H 51    Anion Gap 6 3 - 14 mmol/L  51   Glucose 245 70 - 99 mg/dL H 51   Urea Nitrogen 37 7 - 30 mg/dL H 51   Creatinine 0.56 0.52 - 1.04 mg/dL  51   GFR Estimate -- >60 mL/min/1.7m2  51   Result:         >90  Non  GFR Calc     GFR Estimate If Black -- >60 mL/min/1.7m2  51   Result:         >90   GFR Calc     Calcium 8.2 8.5 - 10.1 mg/dL L 51   Result:              Magnesium [[id number]]  Resulted: 08/03/17 1936, Result status: Final result    Ordering provider: New Haas MD  08/03/17 1000 Resulting lab: [facility]    Specimen Information    Type Source Collected On   Blood  08/03/17 1827          Components       Value Reference Range Flag Lab   Magnesium 2.1 1.6 - 2.3 mg/dL  51            Blood culture [[id number]]  Resulted: 08/03/17 1041, Result status: Final result    Ordering provider: Isha Pacheco MD  07/28/17 0124 Resulting lab: [facility]    Specimen Information    Type Source Collected On   Blood Central Line 07/28/17 0149          Components       Value Reference Range Flag Lab   Specimen Description Blood PURPLE PORT   75   Culture Micro No growth   225   Micro Report Status FINAL 08/03/2017   225            Blood culture [[id number]]  Resulted: 08/03/17 1041, Result status: Final result    Ordering provider: Isha Pacheco MD  07/28/17 0149 Resulting lab: [facility]    Specimen Information    Type Source Collected On     07/28/17 0149          Components       Value Reference Range Flag Lab   Specimen Description Blood Red port   75   Culture Micro No growth   225   Micro Report Status FINAL 08/03/2017   225            CBC with platelets differential [[id number]] (Abnormal)  Resulted: 08/03/17 0531, Result status: Final result    Ordering provider: Kalpana Wren PA-C  08/02/17 2200 Resulting lab: [facility]     Specimen Information    Type Source Collected On   Blood  08/03/17 0309          Components       Value Reference Range Flag Lab   WBC 3.0 4.0 - 11.0 10e9/L L 51   RBC Count 2.85 3.8 - 5.2 10e12/L L 51   Hemoglobin 8.6 11.7 - 15.7 g/dL L 51   Hematocrit 26.1 35.0 - 47.0 % L 51   MCV 92 78 - 100 fl  51   MCH 30.2 26.5 - 33.0 pg  51   MCHC 33.0 31.5 - 36.5 g/dL  51   RDW 25.4 10.0 - 15.0 % H 51   Platelet Count 19 150 - 450 10e9/L LL 51   Comment:  .  Consistent with previous critical result     Diff Method Manual Differential   51   % Neutrophils 93.9 %  51   % Lymphocytes 6.1 %  51   % Monocytes 0.0 %  51   % Eosinophils 0.0 %  51   % Basophils 0.0 %  51   Absolute Neutrophil 2.8 1.6 - 8.3 10e9/L  51   Absolute Lymphocytes 0.2 0.8 - 5.3 10e9/L L 51   Absolute Monocytes 0.0 0.0 - 1.3 10e9/L  51   Absolute Eosinophils 0.0 0.0 - 0.7 10e9/L  51   Absolute Basophils 0.0 0.0 - 0.2 10e9/L  51   Anisocytosis Marked   51   Poikilocytosis Slight   51   RBC Fragments Slight   51   Stomatocytes Slight   51   Platelet Estimate Confirming automated cell count   51            Hepatic panel [[id number]] (Abnormal)  Resulted: 08/03/17 0507, Result status: Final result    Ordering provider: Kalpana Wren PA-C  08/03/17 0309 Resulting lab: [facility]    Specimen Information    Type Source Collected On     08/03/17 0309          Components       Value Reference Range Flag Lab   Bilirubin Direct 0.6 0.0 - 0.2 mg/dL H 51   Bilirubin Total 1.2 0.2 - 1.3 mg/dL  51   Albumin 2.9 3.4 - 5.0 g/dL L 51   Protein Total 5.7 6.8 - 8.8 g/dL L 51   Alkaline Phosphatase 234 40 - 150 U/L H 51   ALT 91 0 - 50 U/L H 51   AST 69 0 - 45 U/L H 51            Lactate Dehydrogenase [[id number]] (Abnormal)  Resulted: 08/03/17 0406, Result status: Final result    Ordering provider: Kalpana Wren PA-C  08/02/17 2200 Resulting lab: [facility]    Specimen Information     Type Source Collected On   Blood  08/03/17 0309          Components       Value Reference Range Flag Lab   Lactate Dehydrogenase 293 81 - 234 U/L H 51            Magnesium [[id number]] (Abnormal)  Resulted: 08/03/17 0406, Result status: Final result    Ordering provider: Kalpana Wren PA-C  08/02/17 2200 Resulting lab: [facility]    Specimen Information    Type Source Collected On   Blood  08/03/17 0309          Components       Value Reference Range Flag Lab   Magnesium 2.4 1.6 - 2.3 mg/dL H 51            Phosphorus [[id number]] (Abnormal)  Resulted: 08/03/17 0406, Result status: Final result    Ordering provider: Kalpana Wren PA-C  08/02/17 2200 Resulting lab: [facility]    Specimen Information    Type Source Collected On   Blood  08/03/17 0309          Components       Value Reference Range Flag Lab   Phosphorus 2.3 2.5 - 4.5 mg/dL L 51            Basic metabolic panel [[id number]] (Abnormal)  Resulted: 08/03/17 0406, Result status: Final result    Ordering provider: Kalpana Wren PA-C  08/02/17 2200 Resulting lab: [facility]    Specimen Information    Type Source Collected On   Blood  08/03/17 0309          Components       Value Reference Range Flag Lab   Sodium 135 133 - 144 mmol/L  51   Potassium 4.2 3.4 - 5.3 mmol/L  51   Chloride 94 94 - 109 mmol/L  51   Carbon Dioxide 36 20 - 32 mmol/L H 51   Anion Gap 5 3 - 14 mmol/L  51   Glucose 199 70 - 99 mg/dL H 51   Urea Nitrogen 30 7 - 30 mg/dL  51   Creatinine 0.54 0.52 - 1.04 mg/dL  51   GFR Estimate -- >60 mL/min/1.7m2  51   Result:         >90  Non  GFR Calc     GFR Estimate If Black -- >60 mL/min/1.7m2  51   Result:         >90   GFR Calc     Calcium 8.2 8.5 - 10.1 mg/dL L 51   Result:              CHROMOSOME BONE MARROW With Professional Interpretation [[id number]]  Resulted: 08/02/17 2253,  Result status: Final result    Ordering provider: Fox Salinas MD  07/10/17 0824 Resulting lab: [facility]    Specimen Information    Type Source Collected On   Bone Marrow  07/12/17 1215          Components       Value Reference Range Flag Lab   Copath Report --      Result:         Patient Name: FIDELIA MIDDLETON  MR#: [id number]  Specimen #: [id number]  Collected: 7/12/2017 12:15  Received: 7/12/2017 14:28  Reported: 8/2/2017 22:51  Ordering Phy(s): FOX SALINAS  Additional Phy(s): LI ALEXANDRA    For improved result formatting, select 'View Enhanced Report Format'  under Linked Documents section.  __________________________________________    TEST(S) REQUESTED:  Bone Marrow Chromosome Analysis    SPECIMEN DESCRIPTION:  Bone Marrow Aspirate    CLINICAL COMMENTS:  ? MDS    Metaphases analyzed:                  6  Additional metaphases screened:        0  Metaphases karyotyped:             1  Banding utilized:             G-banding  Band resolution:                       < 400  Karyotypically normal cells:             6  Karyotypically abnormal cells:             0    METHODS:  Due to the hypocellular nature of this specimen, only a single 24-hour  unstimulated culture could be initiated, that yielded only 6 metaphase  cells for analysis.    ISCN:  46,XX [6]    INTERPRETATION:  Culture of this bone marrow aspirate yielded only 6 metaphase cells for  analysis, among which no clonal numerical or structural chromosomal  abnormality was found. However, the limited number of metaphases  available for analysis in this case is insufficient to rule out the  presence of a clonal abnormality.    ADDITIONAL COMMENTS:  BILLING COMMENT: Limited study charged.    Electronically Signed Out By:  Masha Gonzales M.D., [facility]    CPT Codes:  A: 89728-QXUWJ, 48464-CEQZD, 42573-XCOWBUQ, 04753-YOXKZH, 44590-MISPB  <CR>, 35301-DELHYLQ <CR>, 28759-YVVAGB    TESTING LAB LOCATION:  [facility]  [place]   [address], [facility] [address] [place] [address] [phone]    COLLECTION SITE:  Client:  [employer] [employer], [place]  Location:  [id number] (B)              Potassium [[id number]]  Resulted: 08/02/17 2226, Result status: Final result    Ordering provider: Lenore Rodriguez MD  08/02/17 1739 Resulting lab: [facility]    Specimen Information    Type Source Collected On   Blood  08/02/17 2147          Components       Value Reference Range Flag Lab   Potassium 4.6 3.4 - 5.3 mmol/L  51            Glucose by meter [[id number]] (Abnormal)  Resulted: 08/02/17 2155, Result status: Final result    Ordering provider: Selvin Gandara MD  08/02/17 2150 Resulting lab: POINT OF CARE TEST, GLUCOSE    Specimen Information    Type Source Collected On     08/02/17 2150          Components       Value Reference Range Flag Lab   Glucose 292 70 - 99 mg/dL H 170            Glucose by meter [[id number]] (Abnormal)  Resulted: 08/02/17 1801, Result status: Final result    Ordering provider: Selvin Gandara MD  08/02/17 1755 Resulting lab: POINT OF CARE TEST, GLUCOSE    Specimen Information    Type Source Collected On     08/02/17 1755          Components       Value Reference Range Flag Lab   Glucose 291 70 - 99 mg/dL H 170            Basic metabolic panel [[id number]] (Abnormal)  Resulted: 08/02/17 1702, Result status: Final result    Ordering provider: New Haas MD  08/02/17 1000 Resulting lab: [facility]    Specimen Information    Type Source Collected On   Blood  08/02/17 1606          Components       Value Reference Range Flag Lab   Sodium 134 133 - 144 mmol/L  51   Potassium 3.7 3.4 - 5.3 mmol/L  51   Chloride 92 94 - 109 mmol/L L 51   Carbon Dioxide 34 20 - 32 mmol/L H 51   Anion Gap 8 3 - 14 mmol/L  51   Glucose 297 70 - 99 mg/dL H 51   Urea Nitrogen 30 7 - 30 mg/dL  51   Creatinine 0.60 0.52 - 1.04  mg/dL  51   GFR Estimate -- >60 mL/min/1.7m2  51   Result:         >90  Non  GFR Calc     GFR Estimate If Black -- >60 mL/min/1.7m2  51   Result:         >90   GFR Calc     Calcium 8.3 8.5 - 10.1 mg/dL L 51   Result:              Magnesium [[id number]]  Resulted: 08/02/17 1702, Result status: Final result    Ordering provider: New Haas MD  08/02/17 1000 Resulting lab: [facility]    Specimen Information    Type Source Collected On   Blood  08/02/17 1606          Components       Value Reference Range Flag Lab   Magnesium 2.3 1.6 - 2.3 mg/dL  51            Glucose by meter [[id number]] (Abnormal)  Resulted: 08/02/17 1625, Result status: Final result    Ordering provider: Selvin Gandara MD  08/02/17 1620 Resulting lab: POINT OF CARE TEST, GLUCOSE    Specimen Information    Type Source Collected On     08/02/17 1620          Components       Value Reference Range Flag Lab   Glucose 289 70 - 99 mg/dL H 170            Glucose by meter [[id number]] (Abnormal)  Resulted: 08/02/17 1230, Result status: Final result    Ordering provider: Selvin Gandara MD  08/02/17 1225 Resulting lab: POINT OF CARE TEST, GLUCOSE    Specimen Information    Type Source Collected On     08/02/17 1225          Components       Value Reference Range Flag Lab   Glucose 240 70 - 99 mg/dL H 170            Glucose by meter [[id number]] (Abnormal)  Resulted: 08/02/17 0846, Result status: Final result    Ordering provider: Selvin Gandara MD  08/02/17 0839 Resulting lab: POINT OF CARE TEST, GLUCOSE    Specimen Information    Type Source Collected On     08/02/17 0839          Components       Value Reference Range Flag Lab   Glucose 173 70 - 99 mg/dL H 170            Basic metabolic panel [[id number]] (Abnormal)  Resulted: 08/02/17 0452, Result status: Final result    Ordering provider: Kalpana Wren PA-C  08/01/17 2200 Resulting lab: [facility]    Specimen Information    Type Source Collected On   Blood  08/02/17 0402          Components       Value Reference Range Flag Lab   Sodium 139 133 - 144 mmol/L  51   Potassium 2.9 3.4 - 5.3 mmol/L L 51   Chloride 96 94 - 109 mmol/L  51   Carbon Dioxide 35 20 - 32 mmol/L H 51   Anion Gap 9 3 - 14 mmol/L  51   Glucose 191 70 - 99 mg/dL H 51   Urea Nitrogen 27 7 - 30 mg/dL  51   Creatinine 0.50 0.52 - 1.04 mg/dL L 51   GFR Estimate -- >60 mL/min/1.7m2  51   Result:         >90  Non  GFR Calc     GFR Estimate If Black -- >60 mL/min/1.7m2  51   Result:         >90   GFR Calc     Calcium 7.6 8.5 - 10.1 mg/dL L 51   Result:              CBC with platelets differential [[id number]] (Abnormal)  Resulted: 08/02/17 0452, Result status: Final result    Ordering provider: Kalpana Wren PA-C  08/01/17 2200 Resulting lab: [facility]    Specimen Information    Type Source Collected On   Blood  08/02/17 0402          Components       Value Reference Range Flag Lab   WBC 2.2 4.0 - 11.0 10e9/L L 51   RBC Count 3.05 3.8 - 5.2 10e12/L L 51   Hemoglobin 8.9 11.7 - 15.7 g/dL L 51   Hematocrit 27.6 35.0 - 47.0 % L 51   MCV 91 78 - 100 fl  51   MCH 29.2 26.5 - 33.0 pg  51   MCHC 32.2 31.5 - 36.5 g/dL  51   RDW 25.6 10.0 - 15.0 % H 51   Platelet Count 19 150 - 450 10e9/L LL 51   Comment:  .  Consistent with previous critical result     Diff Method Automated Method   51   % Neutrophils 77.8 %  51   % Lymphocytes 17.1 %  51   % Monocytes 1.4 %  51   % Eosinophils 0.0 %  51   % Basophils 0.0 %  51   % Immature Granulocytes 3.7 %  51   Nucleated RBCs 0 0 /100  51   Absolute Neutrophil 1.7 1.6 - 8.3 10e9/L  51   Absolute Lymphocytes 0.4 0.8 - 5.3 10e9/L L 51   Absolute Monocytes 0.0 0.0 - 1.3 10e9/L  51   Absolute Eosinophils 0.0 0.0 - 0.7 10e9/L  51   Absolute Basophils 0.0 0.0 - 0.2 10e9/L  51   Abs Immature Granulocytes 0.1 0 - 0.4 10e9/L  51    Absolute Nucleated RBC 0.0   51            Lactate Dehydrogenase [[id number]] (Abnormal)  Resulted: 08/02/17 0452, Result status: Final result    Ordering provider: Kalpana Wren PA-C  08/01/17 2200 Resulting lab: [facility]    Specimen Information    Type Source Collected On   Blood  08/02/17 0402          Components       Value Reference Range Flag Lab   Lactate Dehydrogenase 302 81 - 234 U/L H 51            Magnesium [[id number]]  Resulted: 08/02/17 0452, Result status: Final result    Ordering provider: Kalpana Wren PA-C  08/01/17 2200 Resulting lab: [facility]    Specimen Information    Type Source Collected On   Blood  08/02/17 0402          Components       Value Reference Range Flag Lab   Magnesium 2.0 1.6 - 2.3 mg/dL  51            Phosphorus [[id number]]  Resulted: 08/02/17 0452, Result status: Final result    Ordering provider: Kalpana Wren PA-C  08/01/17 2200 Resulting lab: [facility]    Specimen Information    Type Source Collected On   Blood  08/02/17 0402          Components       Value Reference Range Flag Lab   Phosphorus 2.9 2.5 - 4.5 mg/dL  51            Uric acid [[id number]]  Resulted: 08/02/17 0452, Result status: Final result    Ordering provider: Kalpana Wren PA-C  08/01/17 2200 Resulting lab: [facility]    Specimen Information    Type Source Collected On   Blood  08/02/17 0402          Components       Value Reference Range Flag Lab   Uric Acid 3.3 2.6 - 6.0 mg/dL  51            Hepatic panel [[id number]] (Abnormal)  Resulted: 08/02/17 0452, Result status: Final result    Ordering provider: Kalpana Wren PA-C  08/02/17 0402 Resulting lab: [facility]    Specimen Information    Type Source Collected On     08/02/17 0402          Components       Value Reference  Range Flag Lab   Bilirubin Direct 0.7 0.0 - 0.2 mg/dL H 51   Bilirubin Total 1.3 0.2 - 1.3 mg/dL  51   Albumin 2.8 3.4 - 5.0 g/dL L 51   Protein Total 5.7 6.8 - 8.8 g/dL L 51   Alkaline Phosphatase 261 40 - 150 U/L H 51   ALT 86 0 - 50 U/L H 51   AST 82 0 - 45 U/L H 51            Fibrinogen activity [[id number]] (Abnormal)  Resulted: 08/02/17 0451, Result status: Final result    Ordering provider: Kalpana Wren PA-C  08/01/17 2200 Resulting lab: [facility]    Specimen Information    Type Source Collected On   Blood  08/02/17 0402          Components       Value Reference Range Flag Lab   Fibrinogen 139 200 - 420 mg/dL L 51            INR [[id number]] (Abnormal)  Resulted: 08/02/17 0441, Result status: Final result    Ordering provider: Kalpana Wren PA-C  08/01/17 2200 Resulting lab: [facility]    Specimen Information    Type Source Collected On   Blood  08/02/17 0402          Components       Value Reference Range Flag Lab   INR 1.44 0.86 - 1.14 H 51            Glucose by meter [[id number]] (Abnormal)  Resulted: 08/01/17 2141, Result status: Final result    Ordering provider: Selvin Gandara MD  08/01/17 2137 Resulting lab: POINT OF CARE TEST, GLUCOSE    Specimen Information    Type Source Collected On     08/01/17 2137          Components       Value Reference Range Flag Lab   Glucose 230 70 - 99 mg/dL H 170            Glucose by meter [[id number]] (Abnormal)  Resulted: 08/01/17 1645, Result status: Final result    Ordering provider: Selvin Gandara MD  08/01/17 1643 Resulting lab: POINT OF CARE TEST, GLUCOSE    Specimen Information    Type Source Collected On     08/01/17 1643          Components       Value Reference Range Flag Lab   Glucose 204 70 - 99 mg/dL H 170            Glucose by meter [[id number]] (Abnormal)  Resulted: 08/01/17 1246, Result status: Final result    Ordering provider: Selvin Gandara MD  08/01/17  1241 Resulting lab: POINT OF CARE TEST, GLUCOSE    Specimen Information    Type Source Collected On     08/01/17 1241          Components       Value Reference Range Flag Lab   Glucose 243 70 - 99 mg/dL H 170            Potassium [[id number]]  Resulted: 08/01/17 0927, Result status: Final result    Ordering provider: Lenore Rodriguez MD  08/01/17 0819 Resulting lab: [facility]    Specimen Information    Type Source Collected On   Blood  08/01/17 0821          Components       Value Reference Range Flag Lab   Potassium 3.4 3.4 - 5.3 mmol/L  51            Magnesium [[id number]] (Abnormal)  Resulted: 08/01/17 0927, Result status: Final result    Ordering provider: Lenore Rodriguez MD  08/01/17 0819 Resulting lab: [facility]    Specimen Information    Type Source Collected On   Blood  08/01/17 0821          Components       Value Reference Range Flag Lab   Magnesium 2.4 1.6 - 2.3 mg/dL H 51            Hepatic panel [[id number]] (Abnormal)  Resulted: 08/01/17 0927, Result status: Final result    Ordering provider: Lenore Rodriguez MD  08/01/17 0821 Resulting lab: [facility]    Specimen Information    Type Source Collected On     08/01/17 0821          Components       Value Reference Range Flag Lab   Bilirubin Direct 0.8 0.0 - 0.2 mg/dL H 51   Bilirubin Total 1.4 0.2 - 1.3 mg/dL H 51   Albumin 2.9 3.4 - 5.0 g/dL L 51   Protein Total 5.8 6.8 - 8.8 g/dL L 51   Alkaline Phosphatase 292 40 - 150 U/L H 51   ALT 77 0 - 50 U/L H 51   AST 93 0 - 45 U/L H 51            Glucose by meter [[id number]] (Abnormal)  Resulted: 08/01/17 0831, Result status: Final result    Ordering provider: Selvin Gandara MD  08/01/17 0825 Resulting lab: POINT OF CARE TEST, GLUCOSE    Specimen Information    Type Source Collected On     08/01/17 0825          Components       Value Reference Range Flag Lab   Glucose 196 70 - 99 mg/dL H 170             Basic metabolic panel [[id number]] (Abnormal)  Resulted: 08/01/17 0300, Result status: Final result    Ordering provider: Kalpana Wren PA-C  07/31/17 2200 Resulting lab: [facility]    Specimen Information    Type Source Collected On   Blood  08/01/17 0207          Components       Value Reference Range Flag Lab   Sodium 140 133 - 144 mmol/L  51   Potassium 3.1 3.4 - 5.3 mmol/L L 51   Chloride 98 94 - 109 mmol/L  51   Carbon Dioxide 32 20 - 32 mmol/L  51   Anion Gap 10 3 - 14 mmol/L  51   Glucose 230 70 - 99 mg/dL H 51   Urea Nitrogen 30 7 - 30 mg/dL  51   Creatinine 0.54 0.52 - 1.04 mg/dL  51   GFR Estimate -- >60 mL/min/1.7m2  51   Result:         >90  Non  GFR Calc     GFR Estimate If Black -- >60 mL/min/1.7m2  51   Result:         >90   GFR Calc     Calcium 7.5 8.5 - 10.1 mg/dL L 51   Result:              Lactate Dehydrogenase [[id number]] (Abnormal)  Resulted: 08/01/17 0300, Result status: Final result    Ordering provider: Kalpana Wren PA-C  07/31/17 2200 Resulting lab: [facility]    Specimen Information    Type Source Collected On   Blood  08/01/17 0207          Components       Value Reference Range Flag Lab   Lactate Dehydrogenase 330 81 - 234 U/L H 51            Magnesium [[id number]]  Resulted: 08/01/17 0300, Result status: Final result    Ordering provider: Kalpana Wren PA-C  07/31/17 2200 Resulting lab: [facility]    Specimen Information    Type Source Collected On   Blood  08/01/17 0207          Components       Value Reference Range Flag Lab   Magnesium 1.9 1.6 - 2.3 mg/dL  51            Phosphorus [[id number]] (Abnormal)  Resulted: 08/01/17 0300, Result status: Final result    Ordering provider: Kalpana Wren PA-C  07/31/17 2200 Resulting lab: [facility]    Specimen Information     Type Source Collected On   Blood  08/01/17 0207          Components       Value Reference Range Flag Lab   Phosphorus 2.3 2.5 - 4.5 mg/dL L 51            CBC with platelets differential [[id number]] (Abnormal)  Resulted: 08/01/17 0243, Result status: Final result    Ordering provider: Kalpana Wren PA-C  07/31/17 2200 Resulting lab: [facility]    Specimen Information    Type Source Collected On   Blood  08/01/17 0207          Components       Value Reference Range Flag Lab   WBC 2.4 4.0 - 11.0 10e9/L L 51   RBC Count 2.62 3.8 - 5.2 10e12/L L 51   Hemoglobin 7.9 11.7 - 15.7 g/dL L 51   Hematocrit 24.5 35.0 - 47.0 % L 51   MCV 94 78 - 100 fl  51   MCH 30.2 26.5 - 33.0 pg  51   MCHC 32.2 31.5 - 36.5 g/dL  51   RDW 26.6 10.0 - 15.0 % H 51   Platelet Count 18 150 - 450 10e9/L LL 51   Comment:  .  Consistent with previous critical result     Diff Method Automated Method   51   % Neutrophils 82.8 %  51   % Lymphocytes 15.1 %  51   % Monocytes 1.3 %  51   % Eosinophils 0.0 %  51   % Basophils 0.0 %  51   % Immature Granulocytes 0.8 %  51   Nucleated RBCs 1 0 /100 H 51   Absolute Neutrophil 2.0 1.6 - 8.3 10e9/L  51   Absolute Lymphocytes 0.4 0.8 - 5.3 10e9/L L 51   Absolute Monocytes 0.0 0.0 - 1.3 10e9/L  51   Absolute Eosinophils 0.0 0.0 - 0.7 10e9/L  51   Absolute Basophils 0.0 0.0 - 0.2 10e9/L  51   Abs Immature Granulocytes 0.0 0 - 0.4 10e9/L  51   Absolute Nucleated RBC 0.0   51            Testing Performed By     Lab - Abbreviation Name Director Address Valid Date Range    51 - Unknown [facility] Unknown [address] [facility] [address] 12/31/14 1010 - Present    75 - Unknown [facility] Unknown [address] [facility] [address] 01/15/15 1019 - Present    88 - Unknown [facility] Unknown Unknown 10/30/02 0000 - Present    170 - Unknown POINT OF CARE TEST, GLUCOSE Unknown Unknown 10/31/11 1114 - Present     225 - Unknown [facility] LAB[facility] Unknown [address] [facility] [address] 14 0954 - Present               ECG/EMG Results      Echocardiogram Limited [[id number]]  Resulted: 17 0849, Result status: In process    Ordering provider: Maxime Chino MD  17 Performed: 17 0849 - 17 0849    Resulting lab: [facility]             ECHO LIMITED WITH OPTISON [[id number]]  Resulted: 17 0853, Result status: Edited Result - FINAL    Ordering provider: Maxime Chino MD  17 Resulted by: Spenser Mendoza MD    Performed: 17 0849 - 17 0906 Resulting lab: RADIOLOGY RESULTS    Narrative:       [id number]  [id number]  [id number]  [id number]^           [facility]  Echocardiography Laboratory  [address] [place] [address]     Name: FIDELIA MIDDLETON  MRN: [id number]  : 1960  Study Date: 2017 08:53 AM  Age: 56 yrs  Gender: Female  Patient Location: [facility]  Reason For Study: Afib- eval IVC  History: Afib- eval IVC  Ordering Physician: MAXIME CHINO  Referring Physician: LI ALEXANDRA  Performed By: Adelina Lind RDCS     BSA: 1.7 m2  Height: 58 in  Weight: 179 lb  BP: 98/65 mmHg  _____________________________________________________________________________  __        Procedure  Limited Portable Echo Adult. Optison (NDC #6175-9374-05) given intravenously.  Patient was given 6 ml mixture of 3 ml Optison and 6 ml saline. 3 ml wasted.  _____________________________________________________________________________  __        Interpretation Summary  Limited study. Patient with LifeVest.  Heart rate in excess of 120 bpm.  The LVEF cannot be evaluated.  The IVC is probably normal.  _____________________________________________________________________________  __     _____________________________________________________________________________  __                                 Report approved by: Milly Guzman 2017 02:37 PM                    _____________________________________________________________________________  __       1    Type Source Collected On     17 0853          View Image (below)        ECHO COMPLETE WITH OPTISON [[id number]]  Resulted: 07/10/17 1018, Result status: Edited Result - FINAL    Ordering provider: Kelly Smith MD  07/10/17 0938 Resulted by: Thang Yoo MD    Performed: 07/10/17 1110 - 07/10/17 1119 Resulting lab: RADIOLOGY RESULTS    Narrative:       [id number]  [id number]  [id number]  [id number]^CLAIRE^KELLY^JOSÉ ANTONIO           [facility],[place]  Echocardiography Laboratory  [address] [place] [id number]     Name: FIDELIA MIDDLETON  MRN: [id number]  : 1960  Study Date: 07/10/2017 10:18 AM  Age: 56 yrs  Gender: Female  Patient Location: [facility]  Reason For Study: Endocarditis  Ordering Physician: KELLY SMITH  Referring Physician: IL ALEXANDRA  Performed By: Mellisa Boston RDCS, T     BSA: 1.7 m2  Height: 58 in  Weight: 167 lb  _____________________________________________________________________________  __        Procedure  Complete Portable Echo Adult. Contrast Optison. Technically difficult study.  Optison (NDC #1110-3936-23) given intravenously. Patient was given 6 ml  mixture of 3 ml Optison and 6 ml saline. 3 ml wasted.  _____________________________________________________________________________  __        Interpretation Summary  Technically difficult study. Limited study.  Borderline (EF 50-55%) reduced left ventricular function is present.  Global right ventricular function is mildly reduced.  Moderate tricuspid insufficiency is present. This is unchanged from prior, and  was determined to be from prolapse on prior BRE.  No vegetations seen on this challenging study. Consider BRE if suspicion for  endocarditis is  high.  _____________________________________________________________________________  __        Left Ventricle  Borderline (EF 50-55%) reduced left ventricular function is present.     Right Ventricle  The right ventricle is normal size. Global right ventricular function is  mildly reduced.     Atria  The left atrium appears normal. Mild right atrial enlargement is present.        Mitral Valve  The mitral valve is normal. Trace mitral insufficiency is present.     Aortic Valve  Aortic valve is normal in structure and function.     Tricuspid Valve  Moderate tricuspid insufficiency is present. The right ventricular systolic  pressure is approximated at 29.5 mmHg plus the right atrial pressure.     Pulmonic Valve  The pulmonic valve is normal.     Vessels  The aorta root is normal. The inferior vena cava was normal in size with  preserved respiratory variability.     Pericardium  No pericardial effusion is present.     _____________________________________________________________________________  __  MMode/2D Measurements & Calculations  LA Volume (BP): 45.2 ml           Doppler Measurements & Calculations  TV max P.5 mmHg  TR max robby: 271.7 cm/sec  TR max P.5 mmHg           _____________________________________________________________________________  __           Report approved by: Milly Noriega 07/10/2017 11:55 AM       1    Type Source Collected On     07/10/17 1018          View Image (below)        Echocardiogram [[id number]]  Resulted: 07/10/17 1111, Result status: In process    Ordering provider: Kelly Smith MD  07/10/17 0938 Performed: 07/10/17 1110 - 07/10/17 1110    Resulting lab: RADIANT             Transesophageal Echocardiogram [[id number]]  Resulted: 17 1537, Result status: Edited Result - FINAL    Ordering provider: Kelly Smith MD  17 0650 Resulted by: Spenser Mendoza MD    Performed: 17 1510 - 17 1622 Resulting lab: RADIOLOGY RESULTS     Narrative:       [id number]  Critical access hospital  [id number]  [id number]^CLAIRE^ESTELLA^JOSÉ ANTONIO           [facility],[place]  Echocardiography Laboratory  [address] [place] [id number]        Name: FIDELIA MIDDLETON  MRN: [id number]  : 1960  Study Date: 2017 03:37 PM  Age: 56 yrs  Gender: Female  Patient Location: [facility]  Reason For Study: Endocarditis  History: Fever  Ordering Physician: ESTELLA RANGEL  Referring Physician: LI ALEXANDRA  Performed By: Fabian Reyes MD     BSA: 1.7 m2  Height: 58 in  Weight: 165 lb  HR: 112  BP: 100/64 mmHg  Attestation  I was present during BRE probe placement by the fellow, Fabian Reyes. I  personally viewed the imaging and agree with the interpretation and report as  documented by the fellow.  _____________________________________________________________________________  __     Interpretation Summary  There is a small mass (0.7 cm by 0.2 cm) on the ventricular side of the right  coronary cusp. There is a second mass (0.4 cm by 0.2 cm) observed in the LVOT,  attached to the mitro-aortic curtain. There is a third mass on the noncoronary  cusp that measures 0.4 cm by 0.2 cm that could be a healing vegetation. These  findings are compatible with endocarditis if clinical picture supports.  Right ventricular function, chamber size, wall motion, and thickness are  normal.  This study was compared with the study from 7/10/2017.  There is detection of masses on the aortic valve and LVOT.  _____________________________________________________________________________  __        Procedure  Transesophageal Echocardiogram with color and spectral Doppler performed.  Procedure location Echo Lab. Informed consent for Transesophegeal echo  obtained. BRE Probe #GE was used during the procedure. Patient was sedated  using Fentanyl 25 mcg. Patient was sedated using Versed 0.5 mg. The Transducer  was inserted without difficulty . The patient tolerated the procedure  well.  Complications None. The patient's rhythm is atrial fibrillation. Good quality  two-dimensional was performed and interpreted. Good quality color and spectral  Doppler were performed and interpreted.     Left Ventricle  Global and regional left ventricular function is normal with an EF of 55-60%.     Right Ventricle  Right ventricular function, chamber size, wall motion, and thickness are  normal.     Atria  Moderate biatrial enlargement is present. The atrial septum is intact as  assessed by color Doppler .        Mitral Valve  Trace mitral insufficiency is present.     Aortic Valve  There is a small mass (0.7 cm by 0.2 cm) on the ventricular side of the right  coronary cusp. There is a second mass (0.4 cm by 0.2 cm) observed in the LVOT.  There is a third mass on the noncoronary cusp that measures 0.4 cm by 0.2 cm  that could be a healing vegetation. The aortic valve is tricuspid.     Tricuspid Valve  There is tricuspid valve prolapse. Trace tricuspid insufficiency is present.     Pulmonic Valve  The pulmonic valve is normal.     Vessels  The aorta root is normal. The RUPV Doppler shows normal velocity waveform .  The RLPV Doppler shows normal velocity waveform .     Pericardium  No pericardial effusion is present.        Miscellaneous  A right pleural effusion is present.     Compared to Previous Study  This study was compared with the study from 7/10/2017 . There is detection of  masses on the aortic valve and LVOT.     _____________________________________________________________________________  __                       Report approved by: Milly Guzman 07/14/2017 04:52 PM           _____________________________________________________________________________  __       1    Type Source Collected On     07/14/17 1537          View Image (below)              Encounter-Level Documents:     There are no encounter-level documents.      Order-Level Documents:     There are no order-level documents.

## 2017-06-19 NOTE — DISCHARGE SUMMARY
"    General acute hospital   Acute Rehabilitation Unit  Discharge summary     Date of Admission: 6/16/2017  Date of Discharge: 6/19/2017   Disposition: Back to Acute Hospital  Primary Care Physician: Comfort Sabillon  Attending physician: Richy Rubi  Other significant physician provider(s): Dr. Art TOBIN      discharge diagnosis      Discharge back to [facility] hospital for fever and sepsis work up.    Debility 0.38 after cardiac arrest    Impairment of ADL's noted to have impaired ROM, activity tolerance, coordination, safety.  Currently performing lower body dressing with max assist, transferring with close CGA with FWW, fatigues while completing standing ADLs requiring seat for completion.    Impairment of mobility:  Noted to have impaired strength, activity tolerance, functional mobility and balance. Ambulating 60 feet x 2 with FWW and CGA-SBA.      1. Recent Cardiac Arrest/Cardiogenic Shock  2. Acute Respiratory Failure w/ Hypoxia  3. Pancytopenia  4. Rheumatoid Arthritis  5. Hypertension  6. Hyperlipidemia  7. Atrial Fibrillation  8. Left arm open wound with recent extravasation of IV access      brief summary  (Copied from [facility])  Amelia Michel \"Bree\" is a 56 year old right hand dominant female with a history of Rheumatoid Arthritis, HTN, VSD repair 1969, recently diagnosed a- fib with hospitalization 5/15-5/23, admitted to Cardiology 5/29 s/p cardiac arrest with rosc,s/p therapeutic hypothermia.  Etiology of cardiac arrest remains unclear.  Post arrest course was complicated by multi-organ failure including: pancytopenia, shock liver,  hypoxic respiratory failure,  CELSA, anoxic brain injury, and critical illness myopathy.  Prolonged hospital course further complicated by ESBL UTI and aspiration PNA.        Treated with stress dose steroids with ongoing steroid taper.  Planning for ICD per EP unable to proceed with implantation at this time due to LUE IV extravasation/ " "felt to be high infection risk.  Discharged from hospital to ARU for ongoing medical management and aggressive rehabilitation with life vest in place.            On arrival to ARU patient reports overall doing well, denies nausea, chest pain, sob, headache or dizziness.  Reports ~ three loose stools daily without abdominal pain.  Reports intermittent palpitations during which describes feelings of nausea.  Bree fatigued, chronic urinary incontinence, decreased patience with self and other  she feels is related to prednisone.  Reports poor appetite with dislike for our menu, \"I like cold food, you have limited options', encouraged , Wolf, to bring in some food per her preferences.        Functionally, the patient is requiring mod assist for grooming, max assist for bathing, dressing, transfers with min assist ambulating with min assist.  Noted to have impaired memory and safety awareness.       rehabilitaiton course  Was noticing worsening pain in LUE while performing therapeutic exercises over the weekend. This morning awoke and was febrile/septic and discharged back to acute hospital for further management and work up.    Procal is 0.62, lactic acid 3.1, blood cultures/wound cultures pending, UA/UC pending, CXR obtained similar to prior -->rad read pending, Pancytopenic. Started on vanco/ertapenem (has penicillin allergy and decided against zosyn), IV fluid bolus 250 mL started but may not have finished before transfer.    Yesterday, her life vest was beeping, the representative was called, and device should be interrogated/reevaluated. Not sure this is linked with present situation.    Pt had been participating well in therapies and making functional gains in strength and endurance. Was not ready for independent ambulation but was getting closer. Had been outlined for about another week of acute rehab. Suspect she will continue to be good acute rehab candidate after acute medical issues are managed. Please " continue with therapies as she's able to tolerate and we can formally assess for readmission to rehab.       dISCHARGE MEDICATIONS  Current Discharge Medication List      START taking these medications    Details   ertapenem (INVANZ) 1 GM vial Inject 1 g into the vein every 24 hours  Qty: 10 each    Associated Diagnoses: Sepsis, due to unspecified organism (H)      !! insulin aspart (NOVOLOG PEN) 100 UNIT/ML injection Inject 1-7 Units Subcutaneous 3 times daily (before meals)    Associated Diagnoses: Hyperglycemia      !! insulin aspart (NOVOLOG PEN) 100 UNIT/ML injection Inject 1-5 Units Subcutaneous At Bedtime    Associated Diagnoses: Hyperglycemia      morphine 0.1% in solosite topical gel Place 2 g onto the skin 3 times daily  Refills: 0    Associated Diagnoses: Arm pain, left      vancomycin 1,500 mg Inject 1,500 mg into the vein every 12 hours    Associated Diagnoses: Sepsis, due to unspecified organism (H)      zinc sulfate (ZINCATE) 220 (50 ZN) MG capsule Take 1 capsule (220 mg) by mouth daily    Associated Diagnoses: Deficiency of nutrient element, unspecified      ascorbic acid 500 MG TABS Take 1 tablet (500 mg) by mouth daily  Qty: 30 tablet    Associated Diagnoses: Deficiency of nutrient element, unspecified      beta carotene 04586 UNIT capsule Take 1 capsule (25,000 Units) by mouth daily    Associated Diagnoses: Deficiency of nutrient element, unspecified       !! - Potential duplicate medications found. Please discuss with provider.      CONTINUE these medications which have CHANGED    Details   !! melatonin 3 MG tablet Take 1 tablet (3 mg) by mouth nightly as needed for sleep    Associated Diagnoses: Insomnia, unspecified; Hyperglycemia      !! melatonin 1 MG TABS tablet Take 1 tablet (1 mg) by mouth At Bedtime    Associated Diagnoses: Insomnia, unspecified      multivitamin, therapeutic with minerals (THERA-VIT-M) TABS tablet Take 1 tablet by mouth daily  Qty: 30 each, Refills: 0    Associated  Diagnoses: Deficiency of nutrient element, unspecified      !! predniSONE (DELTASONE) 5 MG tablet Take 1 tablet (5 mg) by mouth daily    Associated Diagnoses: Cardiogenic shock (H)      !! predniSONE (DELTASONE) 10 MG tablet Take 1 tablet (10 mg) by mouth daily    Associated Diagnoses: Cardiogenic shock (H)      !! predniSONE (DELTASONE) 2.5 MG tablet Take 3 tablets (7.5 mg) by mouth daily    Associated Diagnoses: Cardiogenic shock (H)       !! - Potential duplicate medications found. Please discuss with provider.      CONTINUE these medications which have NOT CHANGED    Details   acetaminophen (TYLENOL) 325 MG tablet Take 2 tablets (650 mg) by mouth every 4 hours as needed for mild pain  Qty: 100 tablet    Associated Diagnoses: Rheumatoid arthritis involving multiple sites with positive rheumatoid factor (H)      apixaban ANTICOAGULANT (ELIQUIS) 2.5 MG tablet Take 1 tablet (2.5 mg) by mouth 2 times daily    Associated Diagnoses: Paroxysmal atrial fibrillation (H)      cetirizine (ZYRTEC) 10 MG tablet Take 1 tablet (10 mg) by mouth daily  Qty: 30 tablet    Associated Diagnoses: Rash and nonspecific skin eruption      atenolol (TENORMIN) 50 MG tablet Take 1 tablet (50 mg) by mouth daily  Qty: 30 tablet    Associated Diagnoses: Paroxysmal atrial fibrillation (H); Atrial tachycardia (H); Other secondary hypertension      digoxin (LANOXIN) 250 MCG tablet Take 1 tablet (250 mcg) by mouth daily  Qty: 30 tablet    Associated Diagnoses: Atrial tachycardia (H); Paroxysmal atrial fibrillation (H)      triamcinolone (KENALOG) 0.1 % ointment Apply topically 2 times daily    Associated Diagnoses: Rash and nonspecific skin eruption      senna-docusate (SENOKOT-S;PERICOLACE) 8.6-50 MG per tablet Take 1-2 tablets by mouth 2 times daily  Qty: 100 tablet    Associated Diagnoses: Constipation, unspecified constipation type      Calcium Carb-Cholecalciferol 600-800 MG-UNIT TABS Take 2 tablets by mouth every morning      Coenzyme Q10  (COQ-10 PO) Take 1 tablet by mouth daily In the morning      gabapentin (NEURONTIN) 100 MG capsule Take 2 capsules (200 mg) by mouth 3 times daily  Qty: 180 capsule, Refills: 3      folic acid (FOLVITE) 1 MG tablet Take 1 mg by mouth daily.         STOP taking these medications       traMADol (ULTRAM) 50 MG tablet Comments:   Reason for Stopping:                 DISCHARGE INSTRUCTIONS AND FOLLOW UP    Discharge Procedure Orders  Mantoux instructions   Order Comments: Give two-step Mantoux (PPD) Per Facility Policy Yes     General info for SNF   Order Comments: Transfer back to acute hospital for sepsis work up and treatment.     Reason for your hospital stay   Order Comments: You were admitted to acute rehab because of debility related to cardiogenic shock. While on acute rehab, you developed sepsis (bloodstream infection), and required readmission to the acute hospital.     Intake and output   Order Comments: Every shift     Glucose monitor nursing POCT   Order Comments: Before meals and at bedtime     Activity - Up with nursing assistance   Order Specific Question Answer Comments   Is discharge order? Yes      Full Code     Physical Therapy Adult Consult   Order Comments: Evaluate and treat as clinically indicated.    Reason:  Impaired mobility     Occupational Therapy Adult Consult   Order Comments: Evaluate and treat as clinically indicated.    Reason:  Impaired adl/iadl     Contact Isolation     Fall precautions     Advance Diet as Tolerated   Order Comments: Follow this diet upon discharge: Orders Placed This Encounter     Calorie Counts     Adult Formula Drip Feeding: Specified Time Peptamen 1.5; Nasoduodenal tube; Goal Rate: 50; mL/hr; From: 4:00 PM; 12:00 PM; Medication - Tube Feeding Flush Frequency: At least 15-30 mL water before and after medication administration and with tube ...     Room Service     Tray for Swallow Evaluation: Regular Diet; Thin Liquids (water, ice chips, juice, milk ge   Order  Specific Question Answer Comments   Is discharge order? Yes             physical examination    Most recent Vital Signs:   Vitals:    06/18/17 0928 06/18/17 1531 06/19/17 0600 06/19/17 0705   BP: 111/62 110/51 125/82 119/67   BP Location: Right arm Right arm Right arm Right arm   Pulse: 138 64 135    Resp:  18 18 20   Temp:  97.8  F (36.6  C) 97.9  F (36.6  C) 102.6  F (39.2  C)   TempSrc:  Oral Oral Oral   SpO2:  94% 96% 95%   Weight:   75.7 kg (166 lb 12.8 oz)    Height:         Gen: Ill appearing  Lungs: wheezes  CV: tachycardic occasional s4  Abd: soft, hypoactive bowel sounds  Ext: +4 pedal edema  Neuro: alert and oriented, speaking spontaneously      Pt staffed with Dr. Rubi.  Gabriel Zhou D.O., PGY-2 06/19/2017 8:57 AM    I, Dr. Rubi, have reviewed and edited the above resident note and agree.

## 2017-06-19 NOTE — PLAN OF CARE
Problem: Goal/Outcome  Goal: Goal Outcome Summary  OT: Missed AM session, -60 minutes due to fever, high BP and sepsis protocol

## 2017-06-19 NOTE — PROGRESS NOTES
"Looks sick   Febrile  Unable to participate in therapy   Left arm more painful   No dysuria  No sob  On Exam ;   Alert and oriented , but moderate distress  Vital signs:  Temp: 102.6  F (39.2  C) Temp src: Oral BP: 119/67 Pulse: 135 Heart Rate: 134 Resp: 20 SpO2: 95 % O2 Device: None (Room air)   Height: 147.3 cm (4' 10\") Weight: 75.7 kg (166 lb 12.8 oz)  Estimated body mass index is 34.86 kg/(m^2) as calculated from the following:    Height as of this encounter: 1.473 m (4' 10\").    Weight as of this encounter: 75.7 kg (166 lb 12.8 oz).  Neck ; supple , no JVD  Chest ; equal BS bilaterally ,minimal rales  CVS; irregular . tacycardia  GI ; soft abdomen, non tender, BS positive  Ext ; left arm swollen, open area wound   Neuro ; CN 2 to 12 grossly intact , No Facial asymmetry noticed  .  Psych ; appropriate mood and effect  Labs;  Lactic acid pending   Wbc 2.5  procal 0.62  Creatinine 0.66  cxr pending   uc pending   bc pending     A/P;  Fever , Tachypnea, likely sepsis syndrome  Start empiric abx vano and ertapenem   IVF ( caution with heart failure ( 250 cc NS and re-asses  Imaging of left upper ext ? Abscess  Monitor cx  Hold atenolol  Transfer to Hospital for escalation of care in monitored setting   Spoke with PMR Resident and paged PPM for transfer  Spoke with cardiology and patient accepted in transfer   "

## 2017-06-19 NOTE — PROGRESS NOTES
"Pt reports \"I feel sick\". States that her left arm feels more painful. Endorses fever/chills/sweats.    /67 (BP Location: Right arm)  Pulse 135  Temp 102.6  F (39.2  C) (Oral)  Resp 20  Ht 1.473 m (4' 10\")  Wt 75.7 kg (166 lb 12.8 oz)  SpO2 95%  BMI 34.86 kg/m2  Lungs: wheezes  CV: tachycardic occasional s4  Abd: soft, hypoactive bowel sounds  Ext: +4 pedal edema  Neuro: alert and oriented, speaking spontaneously    A/P:    Sirs 3/4  Temp 102   Has a source with LUE wound presumed sepsis  Protocol initiated  Getting blood cultures, wound cultures, UA/UC,   Start IVF 0.9%NS  Lactic Acid pending  Lytes okay, alk phos 180 (159)  WBC 2.5 but around recent baseline  Hgb 10 up from 9  CXR ordered  Procal pending.    Pt staffed with Dr. Rubi.  Gabriel Zhou D.O., PGY-2 06/19/2017 7:42 AM   "

## 2017-06-19 NOTE — LETTER
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       Patient / Family response to discharge plan:  In agreement with DC to  ARU     Persons notified of above discharge plan:  Hematology team, 5C RN staff,  [facility] admissions    Discharge Needs Assessment:  Needs       Most Recent Value    Equipment Currently Used at Home none    Transportation Available car, family or friend will provide        Follow-up plan:  Future Appointments  Date Time Provider Department Center   8/5/2017 6:00 AM Nemesio Berman, PT [facility]   8/16/2017 11:00 AM  MASONIC LAB DRAW ONL [facility]   8/16/2017 11:30 AM  ONCOLOGY INFUSION ONA [facility]   8/16/2017 12:15 PM Lenore Rodriguez MD [facility]   9/15/2017 10:00 AM Pj Cunha MD [facility]   9/27/2017 1:00 PM 1, Uc Cv Device [facility]   9/27/2017 1:30 PM Juan Eaton MD [facility]       Any outstanding tests or procedures:        Referrals     Future Labs/Procedures    Occupational Therapy Adult Consult     Comments:    Evaluate and treat as clinically indicated.    Reason:  Deconditioning, weakness    Physical Therapy Adult Consult     Comments:    Evaluate and treat as clinically indicated.    Reason:  Deconditioning, weakness          DENNIS Portillo, MSW  7D  (Hem/Onc)  Phone:  [phone]  8/4/2017        AVS/Discharge Summary is the source of truth; this is a helpful guide for improved communication of patient story

## 2017-06-19 NOTE — PLAN OF CARE
"Problem: Goal/Outcome  Goal: Goal Outcome Summary  Down for CXR. Return to floor and cultures from left arm wound collected. Need UA/UC. RN Flyer to floor to start IV, unable. Anesthesia called to start IV. Anesthesia called me and they will be here \"in a bit\" to start IV. Dr. Zhou informed now (1000) of delay in IV start. IV start by anesthesia at 1050.     1240 - Up to commode with assist of one. Incontinent in brief and voided 150 cc, UA/UC sent.    VSS, see flow sheet. Denies any pain. IV ABX completed first doses. Report given to Kelly POTTER on 6C ([place]). Transported via stretcher at 1415.  "

## 2017-06-19 NOTE — PLAN OF CARE
Problem: Goal/Outcome  Goal: Goal Outcome Summary  FOCUS/GOAL  Bowel management, Bladder management, Nutrition/Feeding/Swallowing precautions, Pain management and Medical management     ASSESSMENT, INTERVENTIONS AND CONTINUING PLAN FOR GOAL:  Pt is alert and oriented. Incontinent of bladder this shift. No BM, passing flatus. Tube feeding infusing via NG tube without difficulty. Pain effectively managed with Oxycodone. Lifevest intact, alarm x 1, leads repositioned. Pt slept well this shift. Uses call light appropriately, able to make needs known. Will continue with POC.  0700: Pt triggered sepsis protocol. Temp 102.6, , WBC 2.5. Dr. Zhou notified in person. New orders entered. Floor RN and charge RN updated.

## 2017-06-19 NOTE — PROGRESS NOTES
Patient arrived to Unit 6B room 39 accompanied by [facility] transport. Ambulated from cart to bed with assistance. Oriented patient to unit and unit routines. Updated family on plan of care going forward. Admission completed. 2 RN skin assessment completed with Mavis Sherman RN and Ana Rosa Griffin RN. Call light within reach. Continue to monitor.

## 2017-06-20 ENCOUNTER — APPOINTMENT (OUTPATIENT)
Dept: PHYSICAL THERAPY | Facility: CLINIC | Age: 57
DRG: 871 | End: 2017-06-20
Attending: STUDENT IN AN ORGANIZED HEALTH CARE EDUCATION/TRAINING PROGRAM
Payer: COMMERCIAL

## 2017-06-20 ENCOUNTER — APPOINTMENT (OUTPATIENT)
Dept: OCCUPATIONAL THERAPY | Facility: CLINIC | Age: 57
DRG: 871 | End: 2017-06-20
Attending: STUDENT IN AN ORGANIZED HEALTH CARE EDUCATION/TRAINING PROGRAM
Payer: COMMERCIAL

## 2017-06-20 LAB
ANION GAP SERPL CALCULATED.3IONS-SCNC: 7 MMOL/L (ref 3–14)
BASOPHILS # BLD AUTO: 0 10E9/L (ref 0–0.2)
BASOPHILS NFR BLD AUTO: 1.4 %
BUN SERPL-MCNC: 32 MG/DL (ref 7–30)
CALCIUM SERPL-MCNC: 7.5 MG/DL (ref 8.5–10.1)
CHLORIDE SERPL-SCNC: 105 MMOL/L (ref 94–109)
CO2 SERPL-SCNC: 24 MMOL/L (ref 20–32)
CREAT SERPL-MCNC: 0.74 MG/DL (ref 0.52–1.04)
CRP SERPL-MCNC: 110 MG/L (ref 0–8)
DIFFERENTIAL METHOD BLD: ABNORMAL
DIGOXIN SERPL-MCNC: 3.4 UG/L (ref 0.5–2)
EOSINOPHIL # BLD AUTO: 0.2 10E9/L (ref 0–0.7)
EOSINOPHIL NFR BLD AUTO: 5.8 %
ERYTHROCYTE [DISTWIDTH] IN BLOOD BY AUTOMATED COUNT: 21 % (ref 10–15)
GFR SERPL CREATININE-BSD FRML MDRD: 81 ML/MIN/1.7M2
GLUCOSE SERPL-MCNC: 79 MG/DL (ref 70–99)
HCT VFR BLD AUTO: 25.7 % (ref 35–47)
HGB BLD-MCNC: 8.7 G/DL (ref 11.7–15.7)
IMM GRANULOCYTES # BLD: 0 10E9/L (ref 0–0.4)
IMM GRANULOCYTES NFR BLD: 0.4 %
INR PPP: 1.29 (ref 0.86–1.14)
INTERPRETATION ECG - MUSE: NORMAL
LACTATE BLD-SCNC: 1.8 MMOL/L (ref 0.7–2.1)
LYMPHOCYTES # BLD AUTO: 0.5 10E9/L (ref 0.8–5.3)
LYMPHOCYTES NFR BLD AUTO: 17.4 %
MAGNESIUM SERPL-MCNC: 1.7 MG/DL (ref 1.6–2.3)
MCH RBC QN AUTO: 31.9 PG (ref 26.5–33)
MCHC RBC AUTO-ENTMCNC: 33.9 G/DL (ref 31.5–36.5)
MCV RBC AUTO: 94 FL (ref 78–100)
MONOCYTES # BLD AUTO: 0.4 10E9/L (ref 0–1.3)
MONOCYTES NFR BLD AUTO: 12.7 %
NEUTROPHILS # BLD AUTO: 1.7 10E9/L (ref 1.6–8.3)
NEUTROPHILS NFR BLD AUTO: 62.3 %
NRBC # BLD AUTO: 0 10*3/UL
NRBC BLD AUTO-RTO: 0 /100
PLATELET # BLD AUTO: 50 10E9/L (ref 150–450)
POTASSIUM SERPL-SCNC: 4.6 MMOL/L (ref 3.4–5.3)
RBC # BLD AUTO: 2.73 10E12/L (ref 3.8–5.2)
SODIUM SERPL-SCNC: 136 MMOL/L (ref 133–144)
WBC # BLD AUTO: 2.8 10E9/L (ref 4–11)

## 2017-06-20 PROCEDURE — 25000132 ZZH RX MED GY IP 250 OP 250 PS 637: Performed by: STUDENT IN AN ORGANIZED HEALTH CARE EDUCATION/TRAINING PROGRAM

## 2017-06-20 PROCEDURE — 27210436 ZZH NUTRITION PRODUCT SEMIELEM INTERMED CAN

## 2017-06-20 PROCEDURE — 36415 COLL VENOUS BLD VENIPUNCTURE: CPT | Performed by: STUDENT IN AN ORGANIZED HEALTH CARE EDUCATION/TRAINING PROGRAM

## 2017-06-20 PROCEDURE — 97535 SELF CARE MNGMENT TRAINING: CPT | Mod: GO | Performed by: OCCUPATIONAL THERAPIST

## 2017-06-20 PROCEDURE — 97530 THERAPEUTIC ACTIVITIES: CPT | Mod: GP | Performed by: PHYSICAL THERAPIST

## 2017-06-20 PROCEDURE — 86140 C-REACTIVE PROTEIN: CPT | Performed by: INTERNAL MEDICINE

## 2017-06-20 PROCEDURE — 40000133 ZZH STATISTIC OT WARD VISIT: Performed by: OCCUPATIONAL THERAPIST

## 2017-06-20 PROCEDURE — 99233 SBSQ HOSP IP/OBS HIGH 50: CPT | Mod: GC | Performed by: INTERNAL MEDICINE

## 2017-06-20 PROCEDURE — 80048 BASIC METABOLIC PNL TOTAL CA: CPT | Performed by: STUDENT IN AN ORGANIZED HEALTH CARE EDUCATION/TRAINING PROGRAM

## 2017-06-20 PROCEDURE — 12000008 ZZH R&B INTERMEDIATE UMMC

## 2017-06-20 PROCEDURE — 85025 COMPLETE CBC W/AUTO DIFF WBC: CPT | Performed by: STUDENT IN AN ORGANIZED HEALTH CARE EDUCATION/TRAINING PROGRAM

## 2017-06-20 PROCEDURE — 83735 ASSAY OF MAGNESIUM: CPT | Performed by: STUDENT IN AN ORGANIZED HEALTH CARE EDUCATION/TRAINING PROGRAM

## 2017-06-20 PROCEDURE — 80162 ASSAY OF DIGOXIN TOTAL: CPT | Performed by: STUDENT IN AN ORGANIZED HEALTH CARE EDUCATION/TRAINING PROGRAM

## 2017-06-20 PROCEDURE — 97140 MANUAL THERAPY 1/> REGIONS: CPT | Mod: GO | Performed by: OCCUPATIONAL THERAPIST

## 2017-06-20 PROCEDURE — 25000132 ZZH RX MED GY IP 250 OP 250 PS 637: Performed by: INTERNAL MEDICINE

## 2017-06-20 PROCEDURE — 97165 OT EVAL LOW COMPLEX 30 MIN: CPT | Mod: GO | Performed by: OCCUPATIONAL THERAPIST

## 2017-06-20 PROCEDURE — 86140 C-REACTIVE PROTEIN: CPT | Performed by: STUDENT IN AN ORGANIZED HEALTH CARE EDUCATION/TRAINING PROGRAM

## 2017-06-20 PROCEDURE — 25000128 H RX IP 250 OP 636: Performed by: STUDENT IN AN ORGANIZED HEALTH CARE EDUCATION/TRAINING PROGRAM

## 2017-06-20 PROCEDURE — 25000132 ZZH RX MED GY IP 250 OP 250 PS 637

## 2017-06-20 PROCEDURE — 25000125 ZZHC RX 250: Performed by: INTERNAL MEDICINE

## 2017-06-20 PROCEDURE — 40000193 ZZH STATISTIC PT WARD VISIT: Performed by: PHYSICAL THERAPIST

## 2017-06-20 PROCEDURE — 83605 ASSAY OF LACTIC ACID: CPT | Performed by: INTERNAL MEDICINE

## 2017-06-20 PROCEDURE — 97162 PT EVAL MOD COMPLEX 30 MIN: CPT | Mod: GP | Performed by: PHYSICAL THERAPIST

## 2017-06-20 PROCEDURE — 85610 PROTHROMBIN TIME: CPT | Performed by: STUDENT IN AN ORGANIZED HEALTH CARE EDUCATION/TRAINING PROGRAM

## 2017-06-20 PROCEDURE — 99212 OFFICE O/P EST SF 10 MIN: CPT

## 2017-06-20 RX ORDER — LIDOCAINE 40 MG/G
CREAM TOPICAL
Status: DISCONTINUED | OUTPATIENT
Start: 2017-06-20 | End: 2017-06-26

## 2017-06-20 RX ORDER — DIGOXIN 125 MCG
250 TABLET ORAL DAILY
Status: DISCONTINUED | OUTPATIENT
Start: 2017-06-21 | End: 2017-06-20

## 2017-06-20 RX ORDER — HEPARIN SODIUM,PORCINE 10 UNIT/ML
2-5 VIAL (ML) INTRAVENOUS
Status: COMPLETED | OUTPATIENT
Start: 2017-06-20 | End: 2017-06-28

## 2017-06-20 RX ORDER — DIGOXIN 125 MCG
250 TABLET ORAL DAILY
Status: DISCONTINUED | OUTPATIENT
Start: 2017-06-22 | End: 2017-06-20

## 2017-06-20 RX ORDER — LEVOFLOXACIN 5 MG/ML
500 INJECTION, SOLUTION INTRAVENOUS EVERY 24 HOURS
Status: DISCONTINUED | OUTPATIENT
Start: 2017-06-20 | End: 2017-06-21

## 2017-06-20 RX ADMIN — GABAPENTIN 200 MG: 100 CAPSULE ORAL at 08:15

## 2017-06-20 RX ADMIN — Medication 30 MG: at 08:14

## 2017-06-20 RX ADMIN — ATENOLOL 50 MG: 50 TABLET ORAL at 08:14

## 2017-06-20 RX ADMIN — APIXABAN 2.5 MG: 2.5 TABLET, FILM COATED ORAL at 21:33

## 2017-06-20 RX ADMIN — OXYCODONE HYDROCHLORIDE AND ACETAMINOPHEN 500 MG: 500 TABLET ORAL at 08:14

## 2017-06-20 RX ADMIN — ACETAMINOPHEN 650 MG: 325 TABLET, FILM COATED ORAL at 08:15

## 2017-06-20 RX ADMIN — MULTIPLE VITAMINS W/ MINERALS TAB 1 TABLET: TAB at 08:14

## 2017-06-20 RX ADMIN — Medication 2 G: at 22:35

## 2017-06-20 RX ADMIN — Medication 2 G: at 11:11

## 2017-06-20 RX ADMIN — TRIAMCINOLONE ACETONIDE: 1 OINTMENT TOPICAL at 08:15

## 2017-06-20 RX ADMIN — GABAPENTIN 200 MG: 100 CAPSULE ORAL at 21:34

## 2017-06-20 RX ADMIN — Medication 25000 UNITS: at 08:14

## 2017-06-20 RX ADMIN — VANCOMYCIN HYDROCHLORIDE 1500 MG: 10 INJECTION, POWDER, LYOPHILIZED, FOR SOLUTION INTRAVENOUS at 00:04

## 2017-06-20 RX ADMIN — Medication 2 G: at 21:34

## 2017-06-20 RX ADMIN — ERTAPENEM SODIUM 1 G: 1 INJECTION, POWDER, LYOPHILIZED, FOR SOLUTION INTRAMUSCULAR; INTRAVENOUS at 11:39

## 2017-06-20 RX ADMIN — GABAPENTIN 200 MG: 100 CAPSULE ORAL at 14:01

## 2017-06-20 RX ADMIN — CETIRIZINE HYDROCHLORIDE 10 MG: 10 TABLET, FILM COATED ORAL at 08:14

## 2017-06-20 RX ADMIN — PREDNISONE 10 MG: 10 TABLET ORAL at 08:15

## 2017-06-20 RX ADMIN — LEVOFLOXACIN 500 MG: 5 INJECTION, SOLUTION INTRAVENOUS at 10:26

## 2017-06-20 RX ADMIN — Medication 1 MG: at 21:35

## 2017-06-20 RX ADMIN — APIXABAN 2.5 MG: 2.5 TABLET, FILM COATED ORAL at 08:14

## 2017-06-20 RX ADMIN — TRAMADOL HYDROCHLORIDE 25 MG: 50 TABLET, FILM COATED ORAL at 21:51

## 2017-06-20 RX ADMIN — CALCIUM CARBONATE 600 MG (1,500 MG)-VITAMIN D3 400 UNIT TABLET 2 TABLET: at 08:15

## 2017-06-20 RX ADMIN — FOLIC ACID 1 MG: 1 TABLET ORAL at 08:14

## 2017-06-20 RX ADMIN — TRIAMCINOLONE ACETONIDE: 1 OINTMENT TOPICAL at 21:51

## 2017-06-20 RX ADMIN — VANCOMYCIN HYDROCHLORIDE 1500 MG: 10 INJECTION, POWDER, LYOPHILIZED, FOR SOLUTION INTRAVENOUS at 13:06

## 2017-06-20 RX ADMIN — Medication 220 MG: at 08:14

## 2017-06-20 ASSESSMENT — PAIN DESCRIPTION - DESCRIPTORS: DESCRIPTORS: BURNING;CONSTANT

## 2017-06-20 NOTE — PROGRESS NOTES
Patient seen, evaluated and staffed by Cardiology team earlier today.   Patient at current overall doing better  B/P: 107/54, T: 96.9, P: 108, R: 18  Temp (24hrs), Av.4  F (36.3  C), Min:96.5  F (35.8  C), Max:98.1  F (36.7  C)  No new or acute concern.     Issues with piv. Difficult access. Will get Midline, request placed.     Patient transferred to Medicine (Gold 2), will continue to fu.   D/w RN.     Issues per Cardiology :     Assessment & Plan:   Amelia Michel is a 56 year old woman with hx of VSD repair (), Rheumatoid arthritis, and a recent diagnosis of Afib/Aflutter/SVT, who presented to [facility] on  after Out of Hospital cardiac arrest with shockable rhythm. After ROSC in the field, she was admitted from -6/15, extubated . While at ARU (6/15-), pt developed worsening LUE wound pain and fever, and was admitted back to [facility] for further workup.          Changes Today:  - Digoxin discontinued  for trough of 3.4. Continue to hold for now  - General Surgery did not feel that LUE wound warranted surgical intervention, consult discontinued  - Started levofloxacin  for pseudomonal coverage  - Transfer to medicine primary for ongoing management of SIRS/ wound care      #SIRS (Fever, leukopenia, tachcyardia):    DDX includes LUE wound vs. Atelectasis R>L visualized on 17 CXR. Low suspicion for PE given lack of hypoxia. No URI symptoms. Has had intermittent loose stools.  UA with few bacteria, 2 WBC, mucous, 4 hyaline casts, 11 granular casts, 1 cellular cast.  Pt did have a history of potential drug eruption rash during last admission (dermatology was consulted, etiology felt to be potentially 2/2 to digoxin); pt does not have evidence of that rash on admission.   - Treating LUE as below   - F/u CDiff + Enteric panel   - Encourage incentive spirometer, PT/OT       # Left Extremity Wound with concern for cellulitis +/- myositis with potential area of myonecrosis:    LUE onset  around time of cardiac arrest, unclear etiology (?trauma during code), exacerbated by fluid extravasation from IV during last admission. Over course of rehab, pt has noticed increasing pain in LUE with therapy and spiked fevers to 101F and 102F on 6/18 and 6/19, respectively. On admission, wound appears erythematous, with eschar and fibrinous material overlying wound (no purulent drainage), tender to palpation.  6/19 L Elbow XR:  Negative for osteo, diffuse subcutaneous soft tissue stranding, possibly representative of cellulitis   6/19 L Elbow MRI: Diffuse cellulitis and multicompartment soft tissue tissue edema within themuscles about the left elbow, with a focal area of nonenhancement,  centered in the brachialis muscle. Differential includes myositis,including infectious or inflammatory etiology. A focal area of myonecrosis is likely within the brachialis muscle. Correlate  clinically.  --Continue Vancomycin/Ertapenem, started levofloxacin 6/20 for pseudomonal coverage  --General Surgery did not feel that LUE wound warranted surgical intervention, consult discontinued  --BCx's x 2 pending (from ARU)  --Wound Cx pending   --Pain control: Tramadol 25mg q6h prn, Continue morphine gel around edges of wound for pain   --WOC consult placed       # Out of Hospital Cardiac Arrest and Cardiogenic Shock:   - ICD will be deferred at this time in lieu of LUE extravasation/high risk of infection. 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 Plts noted to decrease from 104 to as low as 15 during recent hospitalization requiring transfusion w/ chronically low in the 's as outpatient.  -- If this is chronic ITP, no need for treatment unless plts drift < 30s  -- Continue to  hold RA meds for now  -- Continue Apixaban 2.5 mg BID for anticoagulation. This will need to be held 2 days prior to ICD placement after discharge (this will be deferred given acute infection).      # Afib:   Known Afib w/ RVR from prior hospitalization w/ rates difficult to control despite being on Metoprolol 100 mg XL and Diltiazem. She had 1 DCCV for which she reverted within 24hrs. Sotolol was also attempted but patient did not tolerate the medication due to nausea and vomiting and subsequently failed Metoprolol and Propranolol for the same issue. She was discharged during this previous hospitalization on Amiodarone and was also started on Apixaban. Patient had no evidence of ventriclar arrhythmia prior to discharge. RFA was discuss but was defered as patient had a UTI at the time with ESBL. Given difficulty to control rates, an CMR was performed to r/o inflammation or scar as cause of arrhythmia. CMR did not show atrial inflammation or abnormalities except for enlargement. 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   - Rhythm has been spontaneously converting from NSR to Afib w/ RVR w/ rates into the 130-140's.   - Continue Atenolol 50 mg daily.  - Digoxin discontinued 6/20 for trough of 3.4. Continue to hold for now  - If rates sustain > 130 bpm with symptoms would consider additional dose Atenolol 25 mg PO x 1.      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 Now on 10 mg daily, taper to 7.5 mg on 6/29, then to 5 mg daily after 2 weeks if continues to have low disease activity  -- Continue to hold HCQ and MTX until outpatient follow-up  -- Follow-up with [facility] clinic Dr. Conor Cunha in 4-6 weeks after discharge      # Severe Critical Illness Myopathy:   Significant  deconditioning w/ median neuropathies most likely localized to the wrists and ulnar neuropathies most likely localized to the elbows secondary to RA.  -- Ongoing extensive PT/OT/SLP     # ESBL UTI: Completed course abx.      # Malnutrition:   -- Dysphagia Level 3 diet w/ thin liquids. Straws ok. Pt should be upright, alert, small bites/sips consumed and alternate consistencies.  -- Nutrition recommendations are to continue Peptamen 1.5 Tube feeds to 40 ml/hr x 11 hours which will provide 440 ml TF, 660 kcals, and 30 g protein daily. TFs may be adjusted pending oral intake. Once kcal counts reveal that patient is consuming at least 900 kcals and 45 g protein daily on average can discontinue.  -- Water flushes as needed.  -- Nutrition consult placed      Consulting Services: None (general surgery did not feel surgical intervention required for LUE wound)     CODE: Full  DVT Ppx: Apixaban  Diet/Fluids: Cycled tube feeds, calorie counts, DD3 with thin liquids, nutrition following  - IVF: None   - Electrolyte Protocol:  Yes  - Telemetry: Yes; Life Vest needs to be worn at all times  Disposition: Transfer to medicine.

## 2017-06-20 NOTE — PROGRESS NOTES
CLINICAL NUTRITION SERVICES - ASSESSMENT NOTE     Nutrition Prescription    RECOMMENDATIONS FOR MDs/PROVIDERS TO ORDER:  1. Monitor results of kcal counts (6/20-6/22) and potential need to further adjust TFs. Goal for TFs + oral intake to meet 100% of nutrition needs. Once she is consuming at least 935 kcals and 46 g protein daily, then discontinue TFs.   2. Adjust free water flushes via feeding tube as needed, pending fluid status and oral intake. Currently, free water flushes via feeding tube are minimal.   3. Consider changing beta carotene to vitamin A capsules (20,000 International Units per day). Rec stop vitamin A, zinc, and vitamin C supplementation on ~6/28/17 pm due to ten day duration (pt received supplementation 6/18 but declined 6/19 due to feeling ill).      Recommendations already ordered by Registered Dietitian (RD):  Cycled, supplemental TFs to encourage oral intake  Free water flushes  Oral supplements    Future/Additional Recommendations:  1. Diet order as per SLP. Once diet order is able to be liberalized to regular, suspect oral intake will improve.    2. Encourage high protein and high kcal options, including oral supplements.   3. Continue thera-vit-M to ensure micronutrient needs are met. Continue calcium/vitamin D as ordered due to prednisone.  4. Consider checking folic acid lab to assess need to continue current supplementation.   5. Assess ability to discontinue coenzyme Q10, if able.  6. Consider scheduling antiemetics/antinauseants ~20 minutes prior to meals to help optimize nausea control.        REASON FOR ASSESSMENT  Amelia Michel is a/an 56 year old female assessed by the dietitian for Provider Order - Registered Dietitian to Assess and Order TF per Medical Nutrition Therapy Protocol    NUTRITION HISTORY  -Pt started Peptamen 1.5 TFs on 5/31 via NDT with alvin. Before discharge from this hospital on 6/16/17, pt was on Peptamen 1.5 TFs continuously at a goal rate of 40 mL/hr.  This provided 960 mL TF, 1440 kcals (30 kcal/kg), 65 g PRO (1.4 g/kg), 739 ml free H2O, 54 g Fat (70% from MCTs), 180 g CHO and no Fiber daily to meet 100% of her calorie and protein need due to poor oral intake (average of 348 kcals and 18 g protein daily from 6/8-6/15). Per nutrition note on rehab (6/16/17), Peptamen 1.5 TFs were cycled and slightly increased to 50 mL/hr x 20 hrs (16:00-12:00) which provided 1000 mL TF, 1500 kcals (29 kcal/kg), 68 g pro (1.3 g/kg), 184 g CHO, no fiber, and 770 mL free water. Per RN note, TFs were stopped on 6/18 at 12:00, possibly due to change in pt condition. Diet order on 6/16/17 was a  DD III + thin liquids. SLP was seeing pt for communication and cognitive linguistic evaluation and plan was to trial regular diet soon. No kcal count data available. Per nursing flowsheets, pt was consumed 75% of a meal with a good appetite 6/17 and 50-75% of meals with a fair appetite 6/18. Per RN on 6/18, fair appetite.     -Per H & P pt admitted from rehab for fever and LUE wound pain. H & P indicates malnutrition. Per chart, no N/V unless her heart is racing too fast. Denies abdominal pain but admits to intermittent loose stool.   -Per pt, she was on TFs during hospital stay at rehab. She did not notice an increase in appetite while at rehab with TFs being off four hours during the day. Pt states her appetite has not changed in the past week. She likes berry Ensure Clear and Beneprotein mixed in milk. Per pt, nausea at times, but the main factor affecting her oral intake is the restrictive diet order. Admits that certain medications and quantity may affect her oral intake in addition to her TF duration. Has been tolerating cold foods better than hot foods. Denies diarrhea.  -WOC nurse note on 6/13 indicates large area of epidermal loss with potential full thickness skin loss at area of necrosis 2/2 extravasation. Per H & P, possible general surgery consult. PTA, pt was ordered to receive,  "noting, thera-vit-M, zinc (220 mg/day), beta carotene (25,000 International Units/day), and vitamin C (500 mg/day) which were ordered 6/18-6/19, but pt refused 6/19.     CURRENT NUTRITION ORDERS  Diet: Dysphagia Level 3:  Advanced + thin liquids since 6/19. SLP is consulted this admission to eval and treat.   Intake/Tolerance: No data available as pt was just admitted. She asked for Cheerios with milk and pears for breakfast this morning.   TFs: No orders at this time. Observed naso-feeding tube.     LABS  Labs reviewed    MEDICATIONS  Medications reviewed    ANTHROPOMETRICS  Height: 147.3 cm (4' 10\")  Most Recent Weight: 77.2 kg (170 lb 3.2 oz)    IBW: 43.2 kg  BMI: Obesity Grade II BMI 35-39.9  Weight History: 68.9 kg (4/27/16), 60.5 kg (7/13/16), 64.4 kg (5/23/17) - Current wt remains above her weights PTA. Per pt, this is due to fluid and lymphedema. Pt states her UBW before illness was 136-139# (61.8-63.2 kg).   Dosing Weight: 51 kg (adjusted, based on lowest wt this admission of 76.2 kg on 6/19/17)    ASSESSED NUTRITION NEEDS  Estimated Energy Needs: 0988-4105 kcals/day (25 - 30 kcals/kg)  Justification: Rec low end of range at this time (decreased needs with wt status but increased needs due to stress factors, healing)  Estimated Protein Needs: 61-77 grams protein/day (1.2 - 1.5 grams of pro/kg)  Justification: Increased needs due to stress factors, healing  Estimated Fluid Needs: 9222-7347 mL/day (25 - 30 mL/kg)   Justification: Maintenance needs or per team, pending fluid status    PHYSICAL FINDINGS  See malnutrition section below.  WOC nurse consulted for left upper extremity wound cares.     MALNUTRITION  % Intake: Decreased intake does not meet criteria  % Weight Loss: Not meeting this criteria.     Subcutaneous Fat Loss: None observed, but difficult to assess with fluid status.    Muscle Loss: None observed, but difficult to assess with fluid status. Per pt, possible losses.   Fluid Accumulation/Edema: " Does not meet criteria  Malnutrition Diagnosis: Patient does not meet two of the above criteria necessary for diagnosing malnutrition but is at risk for malnutrition    NUTRITION DIAGNOSIS  Inadequate oral intake related to restrictive diet order, medications, and intermittent nausea as evidenced by kcal count average of 348 kcals and 18 g protein daily from 6/8-6/15 and pt report of no improvement in appetite at rehab per pt.       INTERVENTIONS  Implementation  1. Collaboration with other providers, Enteral Nutrition, Feeding tube flush: Discussed nutrition plan with team. Per team, in agreement with cycled, supplemental TFs to help encourage oral intake. Ordered Peptamen 1.5 at 40 mL/hr x 12 hrs (18:00-6:00 per pt preference). This provides 480 mL TF, 720 kcals (14 kcals/kg), 33 g protein (0.6 g protein/kg), 370 mL H2O, 90 g CHO, and no fiber daily. Ordered free water flushes of 60 mL before and after TF cycle.   2. Medical food supplement therapy: Discussed oral supplements. Ordered berry Ensure Clear at 14:00. Ordered milk and Beneprotein at 10:00.    3. Nutrition education for nutrition relationship to health/disease: Discussed potential strategies to help increase oral intake. Encouraged oral intake and intake of oral supplements.     Goals  Total average nutrition intake to meet 0305-3373 kcals/day (25 - 30 kcals/kg) and 61-77 grams protein/day (1.2 - 1.5 grams of pro/kg).     Monitoring/Evaluation  Progress toward goals will be monitored and evaluated per protocol.     Nutrition will continue to follow.    Inés Brown, MS, RD, LD, [facility]   6C Pgr:  [phone]

## 2017-06-20 NOTE — PLAN OF CARE
Problem: Goal Outcome Summary  Goal: Goal Outcome Summary  Outcome: No Change  P- pt up for weight this am, Hr increased to 120 -130's, afib continues. Pt asymptomatic, room air sats mid 90's.  Pt requested and received her 0600 digoxin dose.  Pt's heart rate had been 70's - 90's during the night while sleeping. Heart rate range 70's - 120's in Afib/flutter.  Pt has Life vest on continuously during the night.  Pt incont of urine this am, mary-care done, adult depends placed.   Pt has a reoccurring(per pt) red rash on her (R) upper arm.   NG tube flushed 40 ml of water.  I-monitored, assessed and addressed pt's status and comfort.   A-Afib/flutter continues and is tolerated by patient.                P-continue to monitor, assess and address pt's status and comfort.  Follow-up on pt's response to digoxin to lower pt's heart rate.

## 2017-06-20 NOTE — PROGRESS NOTES
D - Pt transferred from , after report received by this nurse.  Pt assisted to her bed, placed on tele monitoring(Atrial fib heart rhythm,heart rate 70's 80's.) , vitals checked and unit routines explained.  Pt c/o (L) elbow discomfort due to infiltrated Iv fluids, site wrapped with Kerlex, no evidence of drainage on drsg.  Ordered Tramadol given to pt for her discomfort.  Pt alert and oriented.Enteric isolation protocol explained to pt. Pt states her stools have been soft, no loose diarrhea noted today. LE's ace wrapped due to HERNANDO.  CMS intact, except for (R) heel numbness which is a chronic complaint per pt. Pt wearing Life Vest per orders. Call light placed within pt's reach.  Bedside commode ordered pt request. Call don't fall protocol, explained to pt.    I-Monitored, assessed and addressed pt's status and comfort for changes. Oriented pt to unit and answered questions as needed.  A-uneventful transfer to . Stable at present  P-Continue to monitor, assess and address pt's status and comfort for changes and treat per orders.

## 2017-06-20 NOTE — PLAN OF CARE
Problem: Goal Outcome Summary  Goal: Goal Outcome Summary  Edema 6C: Initial edema evaluation completed. Patient with wraps applied prior to hospital admission. Skin intact with 2+ pitting extending up to mid thighs. Small, closed blister-like skin formation on anterior R shin. Application of GCB to B LEs - R LE from MTPs to knee creases and L LE from MTPs to mid thigh. With edema extending up to hips will alternate use of full leg wraps to allow for increased ease with functional mobility. Remove wraps if causing pain/numbness or become soiled.

## 2017-06-20 NOTE — PROGRESS NOTES
06/20/17 1330   General Information   Discipline OT   Onset of Edema (during hospitalization course. )   Affected Body Part(s) Right LE;Left LE   Etiology Comments cardiac disease, decreased mobility   Edema Precaution Comments Platelets 50. Wraps applied prior to hospital admission.    Pain   Pain comments patient reports no pain in LEs at this time.    Edema Examination / Assessment   Skin Condition Pitting;Dryness;Intact   Skin Condition Comments Skin intact with 2+ pitting up to mid thighs, dryness througout. Pin sized, blister-like skin formation on anterior R shin.    ROM   Range of Motion (WFL) no deficits were identified   Strength   Strength (WFL) (generalized weakness throughout )   Sensation   Sensation (WFL) no deficits were identified  (light touch intact. Baseline neuropathy in R foot, per pt. )   Assessment/Plan   Patient presents with Edema   Assessment Recommend use of GCB at this time for management of LE edema. With edema extending up to mid thighs, will alternate use of full leg wraps so one leg is full wrapped at a time to allow for increased ease with functional mobility.    Assessment of Occupational Performance 1-3 Performance Deficits   Identified Performance Deficits Decreased functional mobility.    Clinical Decision Making (Complexity) Low complexity   Planned Edema Interventions Gradient compression bandaging;Fit for compression garment;Exercises;Precautions to prevent infection/exacerbation;Education   Treatment Frequency 5 times/wk   Treatment Duration 1-2 weeks    Patient, Family and/or Staff in agreement with plan of care. Yes   Risks and benefits of treatment have been explained. Yes   Total Evaluation Time   Total Evaluation Time (Minutes) 5

## 2017-06-20 NOTE — PROGRESS NOTES
"Surgery  06/20/2017    MRI done, no drainable fluid collection or signs of osteomyelitis.    /71 (BP Location: Right arm)  Temp 96.5  F (35.8  C) (Oral)  Resp 18  Ht 1.48 m (4' 10.25\")  Wt 77.2 kg (170 lb 3.2 oz)  SpO2 97%  BMI 35.26 kg/m2  Wound is stable from yesterday    Impression:  Previous IV infiltration wound with eschar overlying.    Continue local wound cares  No indication for debridement  Eschar acting as biologic dressing, may come off spontaneously  Consider plastic surgery consult for skin grafting in future  Abx per primary for cellulitis  Surgery will sign off, call with questions    D/w Dr. Walker, staff.     Alfred Maddox MD  General Surgery PGY-2  Pager [phone]      "

## 2017-06-20 NOTE — PLAN OF CARE
Problem: Goal Outcome Summary  Goal: Goal Outcome Summary  PT/CR/6C     Pt requires min-mod A x1 for bed mobility. Pt is CGA for sit>stand and gait; pt ambulated ~100' w/ FWW and CGA. Pt's HR went from 60 at rest to 120 w/ activity. Pt experiencing A flutter and RN notified. Pt limited by edema, impaired balance, decreased strength, and deconditioning.     Recommend discharge to ARU when medically appropriate to address impairments and increase activity tolerance.  Pt previously was fully IND and ambulatory w/out AD, now with significant impairments in her functional IND and would benefit from coordinated care/medical management provided at ARU in setting of recent MI.

## 2017-06-20 NOTE — CONSULTS
Social Work: Assessment with Discharge Plan    Patient Name:  Fidelia Middleton  :  1960  Age:  56 year old  MRN:  [id number]  Risk/Complexity Score:  Filed Complexity Screen Score: 8  Completed assessment with:  Pt and spouse at bedside    Presenting Information   Reason for Referral:  Discharge plan, Return to ARU  Date of Intake:  2017  Referral Source:  Chart Review  Decision Maker:  Pt when able  Alternate Decision Maker:  Spouse  Health Care Directive:  Declined completing today  Living Situation:  House  Previous Functional Status:  Independent  Patient and family understanding of hospitalization:  Pt states she is wanting to return to ARU after getting her medical cares resolved.  Cultural/Language/Spiritual Considerations:  None reported for today.  Adjustment to Illness:  Pt is in a positive mood and continues to be motivated for ARU.    Physical Health  Reason for Admission:  No diagnosis found.  Services Needed/Recommended:  ARU    Mental Health/Chemical Dependency  Diagnosis:  NA  Support/Services in Place:  NA  Services Needed/Recommended:  NA    Support System  Significant relationship at present time:  Spouse at bedside  Family of origin is available for support:  Yes  Other support available:  Yes  Gaps in support system:  None reported  Patient is caregiver to:  None     Provider Information   Primary Care Physician:  Comfort Sabillon   [phone]   Clinic:  [facility] [address] / [facility] [address]      :  SHELBY    Financial   Income Source:  Retired  Financial Concerns:  None  Insurance:    Payor/Plan Subscriber Name Rel Member # Group #   [employer]AK A* FIDELIA MIDDLETON  [id number]       [id number]       Discharge Plan   Patient and family discharge goal:  Return to ARU  Provided education on discharge plan:  YES  Patient agreeable to discharge plan:  YES  A list of Medicare Certified Facilities was provided to the patient and/or  family to encourage patient choice. Patient's choices for facility are:    [facility]  *2064    Will NH provide Skilled rehabilitation or complex medical:  YES  General information regarding anticipated insurance coverage and possible out of pocket cost was discussed. Patient and patient's family are aware patient may incur the cost of transportation to the facility, pending insurance payment: YES  Barriers to discharge:  Medical stability    Discharge Recommendations   Anticipated Disposition:  Facility:  ARU, referral started with admission  Transportation Needs:  Medical:  Wheelchair  Name of Transportation Company and Phone:  [employer]     Additional comments   SW met with pt and spouse to complete new assessment as pt was a recent discharge from [facility] to ARU.  Pt states that she would like to return to ARU and is motivated to continue therapies.  Pt and spouse report no change from prior assessment.  Pt states she is agreeable to another PMR consult as this may be needed for an insurance auth to return to ARU.  SW discussed referral with  admissions liaison who will be working with insurance for readmission.  SW to follow for return to ARU.    DENNIS Larsen, APSW  6C Unit   Phone: [phone]  Pager: [phone]  Unit: [phone]

## 2017-06-20 NOTE — PROGRESS NOTES
06/20/17 0900   Quick Adds   Type of Visit Initial Occupational Therapy Evaluation   Living Environment   Lives With spouse   Living Arrangements house   Home Accessibility stairs to enter home;bed and bath on same level   Number of Stairs to Enter Home 3   Number of Stairs Within Home 0   Stair Railings at Home none   Transportation Available car;family or friend will provide   Living Environment Comment Pt states that family is looking into getting railings for the stairs and grab bars for the walk in shower.   Self-Care   Dominant Hand right   Usual Activity Tolerance fair  (Pt good activity tolerance prior to hospitalization)   Current Activity Tolerance poor   Regular Exercise no   Equipment Currently Used at Home none   Activity/Exercise/Self-Care Comment Pt stated that she tried to reach 6000 steps on Fitbit and incorporated walking as part of routine.   Functional Level Prior   Ambulation 1-->assistive equipment   Transferring 1-->assistive equipment   Toileting 1-->assistive equipment   Bathing 1-->assistive equipment   Dressing 1-->assistive equipment   Eating 0-->independent  (Nausea limiting appetite, has feeding tube)   Communication 0-->understands/communicates without difficulty   Fall history within last six months no   Which of the above functional risks had a recent onset or change? ambulation;transferring   Prior Functional Level Comment OT: Pt states that she was I prior to initial hospital admission. Since previous DISCH pt has been working in ARU with her functional status reflected above.    General Information   Onset of Illness/Injury or Date of Surgery - Date 06/19/17   Referring Physician Evie Longo   Patient/Family Goals Statement To return home stronger and safely   Additional Occupational Profile Info/Pertinent History of Current Problem 56 year old woman with hx of VSD repair (1969), Rheumatoid arthritis, and a recent diagnosis of Afib/Aflutter/SVT, who presented to [facility] on 5/29  after Out of Hospital cardiac arrest with shockable rhythm. After ROSC in the field, she was admitted from 5/29-6/15, extubated 6/4. While at ARU (6/15-6/19), pt developed worsening LUE wound pain and fever, and was admitted back to [facility] for further workup.    Precautions/Limitations fall precautions   General Observations Pt motivated to participate in therapy and had understanding of therapy process.   Cognitive Status Examination   Orientation orientation to person, place and time   Level of Consciousness alert   Able to Follow Commands WNL/WFL   Personal Safety (Cognitive) WNL/WFL   Memory (pt remembers this writer from previous stay)   Cognitive Comment Further testing required, Pt states that she felt some decline following cardiac arrest   Visual Perception   Visual Perception Wears glasses   Visual Perception Comments NT   Sensory Examination   Sensory Comments Pt reports some numbness and tingling in L UE.  B UE protective sensation intact   Integumentary/Edema   Integumentary/Edema Comments Edema appears in B UE and B LE   Range of Motion (ROM)   ROM Comment B UE WFL except L hand, Pt unable to form fist   Strength   Strength Comments B UE NT 2/2 pain in L UE   Hand Strength   Hand Strength Comments R hand strength WFL, L hand strength impaired   Coordination   Upper Extremity Coordination No deficits were identified   Fine Motor Coordination B UE WFL   Transfer Skill: Sit to Stand   Level of [place]: Sit/Stand contact guard   Physical Assist/Nonphysical Assist: Sit/Stand supervision   Assistive Device for Transfer: Sit/Stand standard walker   Instrumental Activities of Daily Living (IADL)   Previous Responsibilities housekeeping;meal prep;laundry;shopping   IADL Comments Pt states I prior to previous admission admission   General Therapy Interventions   Planned Therapy Interventions ADL retraining;IADL retraining;fine motor coordination training;cognition;motor coordination  "training;ROM;strengthening;home program guidelines;progressive activity/exercise;risk factor education   Clinical Impression   Criteria for Skilled Therapeutic Interventions Met yes, treatment indicated   OT Diagnosis decreased ADL I   Influenced by the following impairments decreased activity tolerance and functional endurance, pain in L UE   Assessment of Occupational Performance 3-5 Performance Deficits   Identified Performance Deficits bathing, dressing, grooming, leisure, home management   Clinical Decision Making (Complexity) Low complexity   Therapy Frequency other (see comments)  (6x/ week)   Predicted Duration of Therapy Intervention (days/wks) 2 weeks   Anticipated Equipment Needs at Discharge dressing stick;long shoe horn;reacher;shower chair;sock aide;bedside commode   Anticipated Discharge Disposition Acute Rehabilitation Facility   Risks and Benefits of Treatment have been explained. Yes   Patient, Family & other staff in agreement with plan of care Yes   Clinical Impression Comments Pt presents with general deconditioning, decreased activity tolerance and pain in L UE limiting functional use resulting in decreased ADL I.  Pt would benefit from skilled OT services to address the problem list above.   [facility] [employer] TM \"6 Clicks\"   2016, Trustees of [facility], under license to [employer].  All rights reserved.   6 Clicks Short Forms Daily Activity Inpatient Short Form   [facility] [employer]  \"6 Clicks\" Daily Activity Inpatient Short Form   1. Putting on and taking off regular lower body clothing? 2 - A Lot   2. Bathing (including washing, rinsing, drying)? 1 - Total   3. Toileting, which includes using toilet, bedpan or urinal? 2 - A Lot   4. Putting on and taking off regular upper body clothing? 2 - A Lot   5. Taking care of personal grooming such as brushing teeth? 3 - A Little   6. Eating meals? 2 - A Lot   Daily Activity Raw Score (Score out of 24.Lower scores equate to lower " levels of function) 12   Total Evaluation Time   Total Evaluation Time (Minutes) 5

## 2017-06-20 NOTE — PROCEDURES
EEG #:  -7      DATE OF STUDY:  06/04/2017      TYPE OF STUDY:  Inpatient video EEG monitoring (ICU)        DURATION OF STUDY:  15 hours 36 minutes 2 seconds.      HISTORY:  Day 7 of video EEG monitoring in patient, Amelia Michel, a 56-year-old who is status post cardiac arrest.  She was admitted with therapeutic hypothermia.  Hypothermia has since been lifted.  Sedative drips have been reduced markedly so that currently she is being treated with fentanyl 75 mcg per hour.  She continues intubated.  She is being treated with levetiracetam 1000 mg per day.  Epileptiform activity has been found in recent EEGs and so she is being monitored for epilepsy.    TECHNICAL SUMMARY: This continuous video- EEG monitoring procedure was performed with 23 scalp electrodes in 10-20 electrode system placements, and additional scalp, precordial and other surface electrodes used for electrical referencing and artifact detection.  Video monitoring was utilized and periodically reviewed by EEG technologists and the physician for electroclinical correlations.     FINDINGS:  Clear indications of wakefulness and sleep in this record.  While patient is behaviorally awake with eye blinks, etc., a 9 Hz posterior dominant rhythm is seen at times.  There is excessive theta and some irregular delta seen during these periods of time.  However, 9-10 Hz posterior dominant rhythm is occasionally visible.  There were no clear focal features.   When patient is behaviorally quiescent, regular diffuse delta is noted, but EEG is continuous.  Vertex waves or sleep spindles are not seen.  There are no clear focal findings.      INTERICTAL ABNORMALITIES:  Rare left posterior hemisphere sharp waves are seen.  These appear less frequent in today's samples than they were in the last several days' studies.      ICTAL:  No electrographic seizures.      IMPRESSION:  Abnormal, consistent with mild to moderate diffuse encephalopathy.  There continues to be a  clear improvement compared to the first record in this series of studies because EEG is now continuous, a posterior dominant rhythm is seen, and there are clear distinctions between wake and sleep.  Rare left posterior hemisphere epileptiform spikes and sharp waves are noted, but these appear less persistent than in the last several days' studies.  Nonetheless, these indicate focal cortical irritability in this region.  Epileptiform discharges in this setting have been associated with clinical diagnosis of seizures.  However, overt seizures have not been seen in today's study      SUMMARY OF 7 DAYS OF VIDEO EEG MONITORING:  While treated with hypothermia and aggressive sedative hypnotic drips, burst suppression low amplitude record was seen.  After lifting of hypothermia, record became somewhat more continuous.  As anesthetic drips were terminated, continuity improved and a posterior dominant rhythm was seen.  On day 4 of the study, however, following the discontinuation of hypothermia and discontinuation of the antiepileptic sedative drips, left posterior temporal epileptiform activity was noted.  This was seen for the remaining 3 days of the study, but appeared to become less persistent on the last day of recording.  Seizures were not recorded at any point.      Study shows significant improvement as hypothermia and sedative drips were lifted.  However, study continued indicating a mild to moderate diffuse encephalopathy even on last day of recording.  The appearance and persistence of a left posterior temporal epileptiform focus indicates a persistent left posterior hemisphere focal cortical irritability.  These findings are associated with clinical diagnosis of seizures.  However, no seizures were recorded in this study.  Nonetheless, the studies do indicate a persistent seizure tendency and would support continued treatment with anticonvulsant medications.         KRISTOPHER LUU MD             D:  2017 16:08   T: 2017 17:54   MT: ELVER      Name:     FIDELIA MIDDLETON   MRN:      [id number]        Account:        [id number]   :      1960           Procedure Date: 2017      Document: [id number]

## 2017-06-20 NOTE — PLAN OF CARE
Problem: Goal Outcome Summary  Goal: Goal Outcome Summary  Outcome: No Change     A: A&Ox4. VSS. Afib. Afebrile. Complains of pain at L arm, managed with morphine gel and prn tramadol. Deniesnausea. DD3, thin liquids diet. Report given to 6C nurse, pt transported belongings at bedside with lifevest on.         Temp:  [97.6  F (36.4  C)-98.1  F (36.7  C)] 97.6  F (36.4  C)  Heart Rate:  [74-83] 81  Resp:  [15-16] 16  BP: ()/(51-64) 90/51  SpO2:  [95 %-100 %] 95 %      R: Continue with POC. Notify primary team with changes.

## 2017-06-20 NOTE — PLAN OF CARE
Problem: Goal Outcome Summary  Goal: Goal Outcome Summary  OT: REC ARU.  Pt presents with general deconditioning, decreased activity tolerance and post surgical precautions resulting in decreased ADL I.  Pt completed 2 sit to stands during sessions with CGA to min A while ambulating ~5 ft to sink.  Pt able to stand at sink for ~5 minutes while completing grooming/ hygiene tasks.

## 2017-06-20 NOTE — H&P
[facility]  Cardiology I History and Physical    Name: Amelia Michel MRN#: [id number]   Age: 56 year old YOB: 1960       Assessment and Plan   Amelia Michel is a 56 year old woman with hx of VSD repair (1969), Rheumatoid arthritis, and a recent diagnosis of Afib/Aflutter/SVT, who presented to [facility] on 5/29 after Out of Hospital cardiac arrest with shockable rhythm. After ROSC in the field, she was admitted from 5/29-6/15, extubated 6/4. While at ARU (6/15-6/19), pt developed worsening LUE wound pain and fever, and was admitted back to [facility] for further workup.     #SIRS (Fever, leukopenia, tachcyardia):  DDX includes LUE wound vs. Atelectasis R>L visualized on 6/19/17 CXR. Low suspicion for PE given lack of hypoxia. No URI symptoms. Has had intermittent loose stools.  UA with few bacteria, 2 WBC, mucous, 4 hyaline casts, 11 granular casts, 1 cellular cast.  Pt did have a history of potential drug eruption rash during last admission (dermatology was consulted, etiology felt to be potentially 2/2 to digoxin); pt does not have evidence of that rash on admission.   --Treating LUE as below   --CDiff + Enteric panel   --Encourage incentive spirometer, PT/OT     #Left Extremity Wound with concern for cellulitis +/- myositis with potential area of myonecrosis:  LUE onset around time of cardiac arrest, unclear etiology (?trauma during code), exacerbated by fluid extravasation from IV during last admission. Over course of rehab, pt has noticed increasing pain in LUE with therapy and spiked fevers to 101F and 102F on 6/18 and 6/19, respectively. On admission, wound appears erythematous, with eschar and fibrinous material overlying wound (no purulent drainage), tender to palpation.  6/19 L Elbow XR:  Negative for osteo, diffuse subcutaneous soft tissue stranding, possibly representative of cellulitis   6/19 L Elbow MRI: Per prelim discussion with Radiologist--> Negative for osteo,  but concerning for cellulitis +/- myositis and area of enhancement concerning for myonecrosis. Final report pending.   --Continue Vancomycin/Ertapenem  --General Surgery consulted, please speak with them again in AM re: MRI results   --BCx's x 2 pending (from ARU)  --Wound Cx pending   --Pain control: Tramadol 25mg q6h prn, Continue morphine gel around edges of wound for pain   --WOC consult placed     # Out of Hospital Cardiac Arrest and Cardiogenic Shock:   - ICD will be deferred at this time in lieu of LUE extravasation/high risk of infection. 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 Plts noted to decrease from 104 to as low as 15 during recent hospitalization requiring transfusion w/ chronically low in the 's as outpatient.  --If this is chronic ITP, no need for treatment unless plts drift < 30s  -- Continue to hold RA meds for now  -- Continue Apixaban 2.5 mg BID for anticoagulation. This will need to be held 2 days prior to ICD placement.    # Afib: Known Afib w/ RVR from prior hospitalization w/ rates difficult to control despite being on Metoprolol 100 mg XL and Diltiazem. She had 1 DCCV for which she reverted within 24hrs. Sotolol was also attempted but patient did not tolerate the medication due to nausea and vomiting and subsequently failed Metoprolol and Propranolol for the same issue. She was discharged during this previous hospitalization on Amiodarone and was also started on Apixaban. Patient had no evidence of ventriclar arrhythmia prior to discharge. RFA was discuss but was defered as patient had a UTI at the time with ESBL. Given difficulty to control rates, an CMR was performed to r/o inflammation or scar as cause of arrhythmia. CMR did not  show atrial inflammation or abnormalities except for enlargement. 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   - Rhythm spontaneously converting from NSR to Afib w/ RVR w/ rates into the 130-140's.   - Continue w/ Digoxin 250 mg daily + Atenolol 50 mg daily.  - If rates sustain > 130 bpm would consider additional dose Atenolol 25 mg PO x 1.    # RA: History of seropositive rheumatoid arthritis (anti-CCP positive) since late 1980;s w/ multiple MTP osteotomies, partial right wrist fusion, longstanding therapy consisting of MTX, predisone and HCQ  -- Continue with Prednisone taper. Now on 10 mg daily, taper to 7.5 mg on 6/29, then to 5 mg daily after 2 weeks if continues to have low disease activity  -- Continue to hold HCQ and MTX until outpatient follow-up  -- Follow-up with [facility] clinic Dr. Conor Cunha in 4-6 weeks after discharge    # Severe Critical Illness Myopathy: Significant deconditioning w/ median neuropathies most likely localized to the wrists and ulnar neuropathies most likely localized to the elbows secondary to RA.  -- Ongoing extensive PT/OT/SLP     # ESBL UTI: Completed course abx.    # Malnutrition:   -- Dysphagia Level 3 diet w/ thin liquids. Straws ok. Pt should be upright, alert, small bites/sips consumed and alternate consistencies.  -- Nutrition recommendations are to continue Peptamen 1.5 Tube feeds to 40 ml/hr x 11 hours which will provide 440 ml TF, 660 kcals, and 30 g protein daily. TFs may be adjusted pending oral intake. Once kcal counts reveal that patient is consuming at least 900 kcals and 45 g protein daily on average can discontinue.  --Water flushes as needed.  -- Nutrition consult placed     ##Other  - Prophylaxis:   -DVT: Apixiban   -GI: None   -Bowel:  None while stool studies pending for loose stools   - FEN:  Nutrition consulted for Tube Feeds + Chalo Counts, dysphagia diet 3 with thin liquids   - IVF: None   - Electrolyte Protocol:   "Yes  - Telemetry: Yes; Life Vest needs to be worn at all times  - Family:  at bedside, updated with plan of care   - Code status: Full code    Disposition:  Admit to Cards 1    Ernestina Garcia  PGY-2  Internal Medicine  [phone]    This patient was staffed with the attending, Dr. Gandara, who agrees with the above assessment and plan.           Chief Complaint     \"They are worried I have sepsis.\"       History of Present Illness      Recent hospitalization 5/29-6/15/17 discharge summary: \"Ms. Michel is a 55 yo F with hx HTN, RA (on MTX, Plaquenil, Prednisone), Afib/Flutter, VSD s/p repair who was admitted on 5/29 after out of hospital cardiac arrest with ROSC and s/p therapeutic hypothermia. She went to cath lab on 5/29 which showed normal coronaries. The etiology of the cardiac arrest remains unclear. This cardiac arrest caused multi-organ failure including bone marrow failure, hypoxic respiratory failure requiring mechanical ventilation, and acute kidney injury. She also suffered some degree of anoxic brain injury, though there is minimal clinical evidence of this, and severe critical illness myopathy. All of these required a stay in the intensive care unit at the [facility] and a lengthy hospitalization afterwards. She has been treated with Meropenem and Ertapenem for ESBL UTI and aspiration PNA during this hospitalization. Shock liver and CELSA have resolved. Hgb has required transfusion but now stable 8-9. WBC mildly low with normal neutrophils. She has been treated with stress dose steroids and now steroid tape w/ PO Prednisone w/ history of RA. Pancytopenia w/ Plts as low as 15, now stabilized in the 30-40's w/ previous intermittent transfusions. Plan for ICD s/p cardiac arrest but unable to proceed w/ implantation during this hospitalization due to LUE extravasation and rash (high infectious risk). Will plan to discharge to rehabilitation facility for severe " "myopathy/deconditioning and continued aggressive rehabilitaiton w/ LifeVest and close follow-up. Plan for involvement of Allied Health Care Teams including: Physical Therapy (>3 months), Occupational Therapy (>3 months), Dietetics/Nutrition (>3 months), and Social Work (>3 months).\"      Pt states that she first developed a fever yesterday (6/18) up to 101. She didn't feel too different, just felt  \"crummy.\" She states that her left upper extremity wound first occurred around Memorial Day, around the time of the cardiac arrest, she felt that it got worse when fluid extravasated into the wound from her IV. Currently, the wound is red, swollen, hot to touch, and painful to palpation. While at ARU, pt was noticing worsening pain in LUE while performing therapeutic exercises over the weekend. This morning awoke and was febrile and discharged back to acute hospital for further management and work up.      Procal is 0.62, lactic acid 3.1, blood cultures/wound cultures pending, UA/UC pending, CXR obtained similar to prior though with R>L atelectasis, Pancytopenic. Started on vanco/ertapenem (has penicillin allergy and decided against zosyn), IV fluid bolus 250 mL started but may not have finished before transfer to [facility].     Pt endorses intermittently feeling hot/sweaty and chilled. She states that she does not develop any nausea/vomiting unless her heart is racing too fast. No chest pain or trouble breathing. No abdominal pain. Has had intermittent loose stools.       Review of Systems   All 12 systems reviewed.  Relevant positives/negatives noted in HPI above      Past Medical History   (Reviewed and updated)  Past Medical History:   Diagnosis Date     Hypertension      Rheumatoid arthritis(714.0)           Past Surgical History   (Reviewed and updated)  Past Surgical History:   Procedure Laterality Date     ANESTHESIA CARDIOVERSION N/A 5/17/2017    Procedure: ANESTHESIA CARDIOVERSION;  ANESTHESIA CARDIOVERSION;  " Surgeon: HAN ANESTHESIA PROVIDER;  Location: [facility] OR     ARTHRODESIS WRIST  2000    Right wrist     FOOT SURGERY      4 left and 2 right     RELEASE CARPAL TUNNEL BILATERAL       REPAIR VENTRICULAR SEPTAL DEFECT  1969         Allergies   (Reviewed and updated)   Allergies   Allergen Reactions     Penicillins      Tape [Adhesive Tape] Rash         Medications   (Reviewed and updated)    Current Outpatient Prescriptions on File Prior to Encounter:  ertapenem (INVANZ) 1 GM vial Inject 1 g into the vein every 24 hours   insulin aspart (NOVOLOG PEN) 100 UNIT/ML injection Inject 1-7 Units Subcutaneous 3 times daily (before meals)   insulin aspart (NOVOLOG PEN) 100 UNIT/ML injection Inject 1-5 Units Subcutaneous At Bedtime   melatonin 3 MG tablet Take 1 tablet (3 mg) by mouth nightly as needed for sleep   melatonin 1 MG TABS tablet Take 1 tablet (1 mg) by mouth At Bedtime   morphine 0.1% in solosite topical gel Place 2 g onto the skin 3 times daily   multivitamin, therapeutic with minerals (THERA-VIT-M) TABS tablet Take 1 tablet by mouth daily   [START ON 7/15/2017] predniSONE (DELTASONE) 5 MG tablet Take 1 tablet (5 mg) by mouth daily   predniSONE (DELTASONE) 10 MG tablet Take 1 tablet (10 mg) by mouth daily   [START ON 7/1/2017] predniSONE (DELTASONE) 2.5 MG tablet Take 3 tablets (7.5 mg) by mouth daily   vancomycin 1,500 mg Inject 1,500 mg into the vein every 12 hours   zinc sulfate (ZINCATE) 220 (50 ZN) MG capsule Take 1 capsule (220 mg) by mouth daily   ascorbic acid 500 MG TABS Take 1 tablet (500 mg) by mouth daily   beta carotene 78367 UNIT capsule Take 1 capsule (25,000 Units) by mouth daily   acetaminophen (TYLENOL) 325 MG tablet Take 2 tablets (650 mg) by mouth every 4 hours as needed for mild pain   apixaban ANTICOAGULANT (ELIQUIS) 2.5 MG tablet Take 1 tablet (2.5 mg) by mouth 2 times daily   cetirizine (ZYRTEC) 10 MG tablet Take 1 tablet (10 mg) by mouth daily   atenolol (TENORMIN) 50 MG tablet Take 1 tablet  "(50 mg) by mouth daily   digoxin (LANOXIN) 250 MCG tablet Take 1 tablet (250 mcg) by mouth daily   triamcinolone (KENALOG) 0.1 % ointment Apply topically 2 times daily   senna-docusate (SENOKOT-S;PERICOLACE) 8.6-50 MG per tablet Take 1-2 tablets by mouth 2 times daily   Calcium Carb-Cholecalciferol 600-800 MG-UNIT TABS Take 2 tablets by mouth every morning   Coenzyme Q10 (COQ-10 PO) Take 1 tablet by mouth daily In the morning   gabapentin (NEURONTIN) 100 MG capsule Take 2 capsules (200 mg) by mouth 3 times daily (Patient taking differently: Take 200 mg by mouth Take 2 capsules (200 mg) by mouth every 6 hours (4 am, 10am, 4pm, 10pm))   folic acid (FOLVITE) 1 MG tablet Take 1 mg by mouth daily.         Family History   (Reviewed and updated)  Family History   Problem Relation Age of Onset     Breast Cancer Mother      Hypertension Mother      Alzheimer Disease Mother      Hypertension Father      Blood Disease Father      Lymphoa     Circulatory Father      A Fib     DIABETES Paternal Grandmother          Social History   (Reviewed and updated)  Social History   Substance Use Topics     Smoking status: Never Smoker     Smokeless tobacco: Never Used     Alcohol use Yes      Comment: Glass of wine two evenings a night           Physical Exam   Vitals: Blood pressure 108/64, temperature 98.1  F (36.7  C), temperature source Oral, resp. rate 16, height 1.48 m (4' 10.25\"), weight 76.2 kg (168 lb 1.6 oz), SpO2 99 %, not currently breastfeeding.    Gen: Resting comfortably, in no acute distress  Head: Normocephalic, atraumatic   Eyes: SHERRIE, EOMI   ENT: No sinus tenderness, oropharynx clear with no erythema or exudates, no LAD, neck supple   CV: Normal rate (88), irregularly irregular rhythm, no murmurs/gallops/rubs  Resp: Non-labored breathing on RA, good air movement bilaterally, fine bibasilar crackles   Abd: +BS, soft, NTND, no costovertebral angle tenderness, no peritoneal signs   Ext: Warm, well-perfused, 1+ edema (in " freshly placed lymphedema wraps)       Data   Labs:  BMP  Recent Labs  Lab 06/19/17  1612 06/19/17  0608 06/16/17  0609 06/15/17  1753    135 136 134   POTASSIUM 4.7 4.6 4.0 4.4   CHLORIDE 105 104 103 102   VIKTORIYA 7.5* 7.8* 7.4* 7.3*   CO2 24 21 28 26   BUN 32* 31* 27 30   CR 0.71 0.66 0.66 0.69   * 159* 141* 190*     CBC  Recent Labs  Lab 06/19/17  1612 06/19/17  0608 06/16/17  0609 06/15/17  1753   WBC 3.1* 2.5* 2.3* 2.7*   RBC 2.79* 3.16* 2.90* 2.75*   HGB 9.0* 10.2* 9.2* 9.0*   HCT 26.4* 30.6* 27.9* 26.5*   MCV 95 97 96 96   MCH 32.3 32.3 31.7 32.7   MCHC 34.1 33.3 33.0 34.0   RDW 20.6* 20.5* 20.1* 19.9*   PLT 44* 51* 51* 52*     INR  Recent Labs  Lab 06/19/17  1612 06/16/17  0609 06/15/17  0556 06/14/17  0554   INR 1.44* 1.36* 1.21* 1.18*     LFTs  Recent Labs  Lab 06/19/17  0608 06/16/17  0609 06/15/17  0556   ALKPHOS 180* 159* 168*   AST 41 34 31   ALT 63* 60* 73*   BILITOTAL 0.7 0.8 0.8   PROTTOTAL 5.4* 4.6* 4.9*   ALBUMIN 1.8* 1.8* 1.8*        EKG:  Atrial fibrillation, rate 80     Microbiology:    Wound culture (aerobic + anaerobic) pending  Gram stain:   Few Gram positive cocci   Rare Gram negative rods   No WBC's seen    Urine Culture pending      Imaging  All images reviewed.  Relevant imaging noted below    CXR  IMPRESSION: Multiple lines and tubes overlying the patient. Small  right pleural effusion is unchanged. No left pleural effusion. No  pneumothorax. Enteric jejunal feeding tube is unchanged. Hazy  opacities are seen throughout the left lung, unchanged. Elevation of  the right hemidiaphragm is again noted.      I interviewed and examined the patient with the house staff.  I agree with the assessment and plan as documented.    Selvin Gandara MD, PhD  Professor of Medicine  Division of Cardiology

## 2017-06-20 NOTE — PROGRESS NOTES
06/20/17 1321   Quick Adds   Type of Visit Initial PT Evaluation       Present no   Living Environment   Lives With spouse   Living Arrangements house   Home Accessibility stairs to enter home   Number of Stairs to Enter Home 3   Number of Stairs Within Home 0   Stair Railings at Home none   Transportation Available car;family or friend will provide   Living Environment Comment Pt states that family is looking into getting railings for the stairs and grab bars for the walk in shower.   Self-Care   Dominant Hand right   Usual Activity Tolerance fair  (Good prior to initial hospitalization)   Current Activity Tolerance poor   Regular Exercise no   Equipment Currently Used at Home none   Activity/Exercise/Self-Care Comment pt would set step goal on Fitbit and worked on walking routine   Functional Level Prior   Ambulation 0-->independent   Transferring 0-->independent   Toileting 0-->independent   Bathing 0-->independent   Dressing 0-->independent   Eating 0-->independent   Communication 0-->understands/communicates without difficulty   Swallowing 0-->swallows foods/liquids without difficulty   Cognition 0 - no cognition issues reported   Fall history within last six months no   Which of the above functional risks had a recent onset or change? ambulation;transferring   Prior Functional Level Comment PT: pt IND prior to initial hospitalization; using FWW in ARU   General Information   Onset of Illness/Injury or Date of Surgery - Date 06/19/17   Referring Physician Evie Longo MD   Patient/Family Goals Statement Build endurance, work on transfers, eventually return home IND   Pertinent History of Current Problem (include personal factors and/or comorbidities that impact the POC) Pt initially admitted for cardiac arrest on 5/29. Pt has rheumatoid arthritis and recent history of Afib/Aflutter. Pt transferred to ARU on 6/15, but was readmitted on 6/19 d/t fever and suspected sepsis. Concern  for wound on L UE as potential source of infection. Pt has B edema in LEs restricting mobility.  (Pt w/ Lifevest.)   Precautions/Limitations no known precautions/limitations   Heart Disease Risk Factors Medical history;Gender;Age;Overweight;High blood pressure   Cognitive Status Examination   Orientation orientation to person, place and time   Level of Consciousness alert   Follows Commands and Answers Questions 100% of the time   Personal Safety and Judgment intact   Memory intact   Cognitive Comment Pt reports sometimes feeling foggy or having trouble w/ word searching but pt and spouse both report this has improved and is not very noticeable/frequent. Pt also reports some difficulty concentrating i.e. unable to focus attention to read-will re-read same paragraph and not retain   Pain Assessment   Patient Currently in Pain No   Integumentary/Edema   Integumentary/Edema Comments Pt has B LE edema and is being seen, during eval pt had new wraps on both legs. L leg was wrapped above the knee, R leg below the knee. Also a wrap on L UE for wound, and some concern for edema in R UE. NS consulted for R UE to confirm IV not infiltrating.   Posture    Posture Forward head position;Protracted shoulders   Posture Comments PT: pt able to maintain more erect sitting and standing posture compared to previous admission before ARU.   Range of Motion (ROM)   ROM Comment L UE NT d/t wound. R UE WFL. B LEs limited by edema.   Strength   Strength Comments B UEs NT d/t discomfort w/ wound. B LEs 3-4/5, pt has difficulty moving through enough range to get better idea of strength d/t edema in LEs   Bed Mobility   Bed Mobility Comments PT: pt needed min A x1 to roll far enough in bed to sit using log roll technique. Pt also needed min A x1 for sidelying>sit for UE/trunk. Pt needed mod A x1 to lift legs from EOB>supine.   Transfer Skills   Transfer Comments PT: pt stood from EOB w/ CGA to FWW. Pt stand>sit EOB IND.   Gait   Gait Comments  "PT: pt ambulated ~100' w/ CGA and FWW   Balance   Balance Comments PT: pt's static sitting and standing balance is good. Pt has impaired dynamic balance.   Sensory Examination   Sensory Perception Comments PT: pt reports odd sensation in R UE d/t new swelling; spouse reports neuropathy in pt's R foot, experiencing <1 year   General Therapy Interventions   Planned Therapy Interventions balance training;bed mobility training;gait training;transfer training;home program guidelines;progressive activity/exercise   Clinical Impression   Criteria for Skilled Therapeutic Intervention yes, treatment indicated   PT Diagnosis Impaired functional mobility, pt not functioning near prior level of independence   Influenced by the following impairments Impaired balance, edema, decreased strength, endurance   Functional limitations due to impairments IND bed mobility, transfers, gait, activity tolerance   Clinical Presentation Evolving/Changing   Clinical Presentation Rationale Pt has intermittant Afib/Aflutter, recent MI, worsening L UE wound   Clinical Decision Making (Complexity) Moderate complexity   Therapy Frequency` daily   Predicted Duration of Therapy Intervention (days/wks) 2 weeks   Anticipated Discharge Disposition Acute Rehabilitation Facility   Risk & Benefits of therapy have been explained Yes   Patient, Family & other staff in agreement with plan of care Yes   [facility] [employer] \"6 Clicks\"   2016, Trustees of [facility], under license to [employer].  All rights reserved.   6 Clicks Short Forms Basic Mobility Inpatient Short Form   [facility] [employer]  \"6 Clicks\" V.2 Basic Mobility Inpatient Short Form   1. Turning from your back to your side while in a flat bed without using bedrails? 3 - A Little   2. Moving from lying on your back to sitting on the side of a flat bed without using bedrails? 2 - A Lot   3. Moving to and from a bed to a chair (including a wheelchair)? 3 - A Little   4. Standing " up from a chair using your arms (e.g., wheelchair, or bedside chair)? 3 - A Little   5. To walk in hospital room? 3 - A Little   6. Climbing 3-5 steps with a railing? 2 - A Lot   Basic Mobility Raw Score (Score out of 24.Lower scores equate to lower levels of function) 16   Total Evaluation Time   Total Evaluation Time (Minutes) 15

## 2017-06-20 NOTE — PROGRESS NOTES
[facility]   WO Nurse Inpatient Wound Assessment     Initial Assessment of wound(s) on pt's:   Left arm    Data:     Patient History:      per MD note(s):  56 year old woman with hx of VSD repair (1969), Rheumatoid arthritis, and a recent diagnosis of Afib/Aflutter/SVT, who presented to [facility] on 5/29 after Out of Hospital cardiac arrest with shockable rhythm. After ROSC in the field, she was admitted from 5/29-6/15, extubated 6/4. While at ARU (6/15-6/19), pt developed worsening LUE wound pain and fever, and was admitted back to [facility] for further workup    WOC to assess left arm extravasation site    Moisture Management:  Dressings    Current Diet / Nutrition:         Active Diet Order      Dysphagia Diet Level 3 Advanced Thin Liquids (water, ice chips, juice, milk gelatin, ice cream, etc)        Tube feeding       Jay Score  Avg: 15.2  Min: 9  Max: 23   Recent Labs   Lab Test  06/20/17   0709  06/19/17   1612  06/19/17   0608   05/31/17   1018   ALBUMIN   --    --   1.8*   < >  1.9*   HGB  8.7*  9.0*  10.2*   < >  8.3*   INR  1.29*  1.44*   --    < >  3.38*   WBC  2.8*  3.1*  2.5*   < >  7.5   A1C   --    --    --    --   Canceled, Test credited   UNABLE TO CALCULATE % HBA1C DUE TO LOW HGB AND/OR LOW HGBA1C  NOTIFIED CHELSEA BEE RN AT 1253 ON 5/31/17 [facility]     CRP   --   98.0*   --    < >   --     < > = values in this interval not displayed.       Wound Assessment :   Left arm  Wound History:  Extravasation wound with large area of epidermal loss         6/13/17 photo                                                    6/20/17      Wound Base: 90% adherent tan slough/necrosis surrounded by 10% thin yellow slough.  New epithelium at edges    Specific Dimensions (length x width x depth, in cm) :   10 x 2.6 x 0.1 cm     Palpation of the wound bed:  edema    Periwound Skin: intact, red erythema     Drainage:  . Amount: moderate . Color: serous     Odor: none    Pain:  Increased 4 days ago.  Using  Morphine gel at wound edges which is helping somewhat per pt.     Has been having difficulty with the dressings becoming dislodged with movement      Intervention:     Patient's chart evaluated.      Wound(s) was assessed    Wound Care: was done: changed dressing, Visual inspection    Orders  Updated    Supplies  Polymem foam to clean and debride wound    Discussed plan of care with  patient          Assessment:       Large area of epidermal loss with potential full thickness skin loss at area of necrosis 2/2 extravastion         Plan:     Nursing to notify the Provider(s) and re-consult the WOC Nurse if wound(s) deteriorate(s).    Plan of care for wound located on left arm: Change entire dressing every other day, clean skin around wound with Microklenz.  DO NOT clean wound-dressing is designed to treat and clean wound. Cover wound with Polymem foam, ABD, secure with Kerlix and loosely wrapped ACE wrap    Three time a day: unwrap dressing and apply Morphine gel to wound edges, avoiding the brown necrotic areas    WOC Nurse will return: weekly     Face to face time: 20 minutes

## 2017-06-20 NOTE — PROGRESS NOTES
Cardiology Daily Note   Date of Service: 6/20/2017  Patient: Amelia Michel  MRN: [id number]  Admission Date: 6/19/2017  Hospital Day # 1    Assessment & Plan:   Amelia Michel is a 56 year old woman with hx of VSD repair (1969), Rheumatoid arthritis, and a recent diagnosis of Afib/Aflutter/SVT, who presented to [facility] on 5/29 after Out of Hospital cardiac arrest with shockable rhythm. After ROSC in the field, she was admitted from 5/29-6/15, extubated 6/4. While at ARU (6/15-6/19), pt developed worsening LUE wound pain and fever, and was admitted back to [facility] for further workup.         Changes Today:  - Digoxin discontinued 6/20 for trough of 3.4. Continue to hold for now  - General Surgery did not feel that LUE wound warranted surgical intervention, consult discontinued  - Started levofloxacin 6/20 for pseudomonal coverage  - Transfer to medicine primary for ongoing management of SIRS/ wound care     #SIRS (Fever, leukopenia, tachcyardia):    DDX includes LUE wound vs. Atelectasis R>L visualized on 6/19/17 CXR. Low suspicion for PE given lack of hypoxia. No URI symptoms. Has had intermittent loose stools.  UA with few bacteria, 2 WBC, mucous, 4 hyaline casts, 11 granular casts, 1 cellular cast.  Pt did have a history of potential drug eruption rash during last admission (dermatology was consulted, etiology felt to be potentially 2/2 to digoxin); pt does not have evidence of that rash on admission.   - Treating LUE as below   - F/u CDiff + Enteric panel   - Encourage incentive spirometer, PT/OT      # Left Extremity Wound with concern for cellulitis +/- myositis with potential area of myonecrosis:    LUE onset around time of cardiac arrest, unclear etiology (?trauma during code), exacerbated by fluid extravasation from IV during last admission. Over course of rehab, pt has noticed increasing pain in LUE with therapy and spiked fevers to 101F and 102F on 6/18 and 6/19, respectively. On admission, wound  appears erythematous, with eschar and fibrinous material overlying wound (no purulent drainage), tender to palpation.  6/19 L Elbow XR:  Negative for osteo, diffuse subcutaneous soft tissue stranding, possibly representative of cellulitis   6/19 L Elbow MRI: Diffuse cellulitis and multicompartment soft tissue tissue edema within themuscles about the left elbow, with a focal area of nonenhancement,  centered in the brachialis muscle. Differential includes myositis,including infectious or inflammatory etiology. A focal area of myonecrosis is likely within the brachialis muscle. Correlate  clinically.  --Continue Vancomycin/Ertapenem, started levofloxacin 6/20 for pseudomonal coverage  --General Surgery did not feel that LUE wound warranted surgical intervention, consult discontinued  --BCx's x 2 pending (from ARU)  --Wound Cx pending   --Pain control: Tramadol 25mg q6h prn, Continue morphine gel around edges of wound for pain   --WOC consult placed      # Out of Hospital Cardiac Arrest and Cardiogenic Shock:   - ICD will be deferred at this time in lieu of LUE extravasation/high risk of infection. Pt will need to wear LifeVest until medically appropriate and cleared from infection/wound standpoint for ICD implantation likely 4-6 weeks per EP (pending hospital course for LUE wound)  - Continue apxiban 2.5mg PO BID      # Acute on Chronic Thrombocytopenia:   2/2 hyporoliferative bone marrow. Plts noted to decrease from 104 to as low as 15 during recent hospitalization requiring transfusion w/ chronically low in the 's as outpatient.  -- If this is chronic ITP, no need for treatment unless plts drift < 30s  -- Continue to hold RA meds for now  -- Continue Apixaban 2.5 mg BID for anticoagulation. This will need to be held 2 days prior to ICD placement after discharge (this will be deferred given acute infection).     # Afib:   Known Afib w/ RVR from prior hospitalization w/ rates difficult to control despite being  on Metoprolol 100 mg XL and Diltiazem. She had 1 DCCV for which she reverted within 24hrs. Sotolol was also attempted but patient did not tolerate the medication due to nausea and vomiting and subsequently failed Metoprolol and Propranolol for the same issue. She was discharged during this previous hospitalization on Amiodarone and was also started on Apixaban. Patient had no evidence of ventriclar arrhythmia prior to discharge. RFA was discuss but was defered as patient had a UTI at the time with ESBL. Given difficulty to control rates, an CMR was performed to r/o inflammation or scar as cause of arrhythmia. CMR did not show atrial inflammation or abnormalities except for enlargement. 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   - Rhythm has been spontaneously converting from NSR to Afib w/ RVR w/ rates into the 130-140's.   - Continue Atenolol 50 mg daily.  - Digoxin discontinued 6/20 for trough of 3.4. Continue to hold for now  - If rates sustain > 130 bpm with symptoms would consider additional dose Atenolol 25 mg PO x 1.     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 Now on 10 mg daily, taper to 7.5 mg on 6/29, then to 5 mg daily after 2 weeks if continues to have low disease activity  -- Continue to hold HCQ and MTX until outpatient follow-up  -- Follow-up with [facility] Rheumat[facility] Dr. Conor Cunha in 4-6 weeks after discharge     # Severe Critical Illness Myopathy:   Significant deconditioning w/ median neuropathies most likely localized to the wrists and ulnar neuropathies most likely localized to the elbows secondary to RA.  -- Ongoing extensive PT/OT/SLP     # ESBL UTI: Completed course abx.     # Malnutrition:   -- Dysphagia Level 3 diet w/ thin liquids. Straws ok. Pt  "should be upright, alert, small bites/sips consumed and alternate consistencies.  -- Nutrition recommendations are to continue Peptamen 1.5 Tube feeds to 40 ml/hr x 11 hours which will provide 440 ml TF, 660 kcals, and 30 g protein daily. TFs may be adjusted pending oral intake. Once kcal counts reveal that patient is consuming at least 900 kcals and 45 g protein daily on average can discontinue.  -- Water flushes as needed.  -- Nutrition consult placed      Consulting Services: None (general surgery did not feel surgical intervention required for LUE wound)    CODE: Full  DVT Ppx: Apixaban  Diet/Fluids: Cycled tube feeds, calorie counts, DD3 with thin liquids, nutrition following  - IVF: None   - Electrolyte Protocol:  Yes  - Telemetry: Yes; Life Vest needs to be worn at all times  Disposition: Transfer to medicine.    Pt's care was discussed with bedside RN and patient during Care Team Rounds.    Patient was discussed and evaluated with Dr. Nichol Longo MD PGY2  Internal Medicine Resident  Pager: [phone]    ___________________________________________________________________    Subjective & Interval History:     Last 24 hr care team notes reviewed. No acute events overnight, admitted yesterday afternoon. Denies any nausea, vomiting, shortness of breath. Does note palpitations with her afib. Went into afib after 0600 this morning and spontaneously converted to NSR a couple hours later, remained HDS. No new concerns today.    Medications: Reviewed in EPIC. List below for reference    Physical Exam:    Blood pressure (!) 87/61, temperature 97.6  F (36.4  C), temperature source Oral, resp. rate 16, height 1.48 m (4' 10.25\"), weight 77.2 kg (170 lb 3.2 oz), SpO2 96 %, not currently breastfeeding.    CONSTITUTIONAL: Ms. Moran is sitting up in bed in no apparent distress on exam.  HEENT:  NC/AT.  OP Clear.  MMM. PERRLA.  EOMI. NJ in place.  NECK: Supple, no cervical LAD, no JVD.   LUNGS: CTAB w/ no " wheezes or crackles appreciated.   CARDIOVASCULAR: RRR w/ no M/R/G.   ABDOMEN: Soft, ND, NT, BS +ve.   MUSCULOSKELETAL:  Moves all four extremities without difficulty.  DP pulses intact.  No lower extremity edema.   NEURO: A&Ox3, CN II-XII grossly intact, non-focal.   PSYCHIATRIC:  Normal affect.  SKIN: Warm, Dry, No rashes.     Lines/Tubes:   Peripheral IV 06/19/17 Right Lower forearm (Active)   Site Assessment WDL 6/19/2017  8:00 PM   Line Status Saline locked 6/19/2017  8:00 PM   Phlebitis Scale 0-->no symptoms 6/19/2017  8:00 PM   Infiltration Scale 0 6/19/2017  8:00 PM   Number of days:1       Labs & Studies of Note: Reviewed in Epic  CMP  Recent Labs  Lab 06/19/17  1612 06/19/17  0608 06/16/17  0609 06/15/17  1753 06/15/17  0556  06/14/17  0554    135 136 134 136  < > 139   POTASSIUM 4.7 4.6 4.0 4.4 4.6  < > 4.3   CHLORIDE 105 104 103 102 102  < > 103   CO2 24 21 28 26 28  < > 32   ANIONGAP 7 10 5 7 7  < > 4   * 159* 141* 190* 105*  < > 92   BUN 32* 31* 27 30 30  < > 30   CR 0.71 0.66 0.66 0.69 0.71  < > 0.72   GFRESTIMATED 85 >90Non  GFR Calc >90Non  GFR Calc 87 85  < > 84   GFRESTBLACK >90African American GFR Calc >90African American GFR Calc >90African American GFR Calc >90African American GFR Calc >90African American GFR Calc  < > >90African American GFR Calc   VIKTORIYA 7.5* 7.8* 7.4* 7.3* 7.6*  < > 7.7*   MAG 1.8  --  1.7  --  2.1  --  2.0   PROTTOTAL  --  5.4* 4.6*  --  4.9*  --   --    ALBUMIN  --  1.8* 1.8*  --  1.8*  --   --    BILITOTAL  --  0.7 0.8  --  0.8  --   --    ALKPHOS  --  180* 159*  --  168*  --   --    AST  --  41 34  --  31  --   --    ALT  --  63* 60*  --  73*  --   --    < > = values in this interval not displayed.    CBC  Recent Labs  Lab 06/19/17  1612 06/19/17  0608 06/16/17  0609 06/15/17  4493   WBC 3.1* 2.5* 2.3* 2.7*   RBC 2.79* 3.16* 2.90* 2.75*   HGB 9.0* 10.2* 9.2* 9.0*   HCT 26.4* 30.6* 27.9* 26.5*   PLT 44* 51* 51* 52*        INR  Recent Labs  Lab 06/19/17  1612 06/16/17  0609 06/15/17  0556 06/14/17  0554   INR 1.44* 1.36* 1.21* 1.18*       Unresulted Labs Ordered in the Past 30 Days of this Admission     Date and Time Order Name Status Description    6/19/2017 1240 Urine Culture Aerobic Bacterial Preliminary     6/19/2017 0725 Anaerobic bacterial culture Preliminary     6/19/2017 0724 Wound Culture Aerobic Bacterial Preliminary     6/19/2017 0719 Blood culture Preliminary     6/19/2017 0719 Blood culture Preliminary     5/31/2017 1012 s100 B: Laboratory Miscellaneous Order In process     5/29/2017 1046 Adalimumab Activity and Neutralizing Antibodies: Laboratory Miscellaneous Order In process           Medication List for Reference (delete if desired)  Current Facility-Administered Medications   Medication     acetaminophen (TYLENOL) tablet 650 mg     apixaban ANTICOAGULANT (ELIQUIS) tablet 2.5 mg     ascorbic acid tablet 500 mg     atenolol (TENORMIN) tablet 50 mg     beta carotene capsule 25,000 Units     cetirizine (zyrTEC) tablet 10 mg     digoxin (LANOXIN) tablet 250 mcg     ertapenem (INVanz) 1 g vial to attach to  mL bag     folic acid (FOLVITE) tablet 1 mg     gabapentin (NEURONTIN) capsule 200 mg     melatonin tablet 3 mg     morphine 0.1% in solosite topical gel 2 g     multivitamin, therapeutic with minerals (THERA-VIT-M) tablet 1 tablet     triamcinolone (KENALOG) 0.1 % ointment     vancomycin (VANCOCIN) 1,500 mg in NaCl 0.9 % 250 mL intermittent infusion     zinc sulfate (ZINCATE) capsule 220 mg     lidocaine 1 % 1 mL     lidocaine (LMX4) kit     sodium chloride (PF) 0.9% PF flush 3 mL     sodium chloride (PF) 0.9% PF flush 3 mL     medication instruction     acetaminophen (TYLENOL) tablet 650 mg     acetaminophen (TYLENOL) Suppository 650 mg     Patient is already receiving anticoagulation with heparin, enoxaparin (LOVENOX), warfarin (COUMADIN)  or other anticoagulant medication     glucose 40 % gel 15-30 g     Or     dextrose 50 % injection 25-50 mL    Or     glucagon injection 1 mg     insulin aspart (NovoLOG) inj (RAPID ACTING)     insulin aspart (NovoLOG) inj (RAPID ACTING)     naloxone (NARCAN) injection 0.1-0.4 mg     calcium-vitamin D (CALTRATE) 600-400 MG-UNIT per tablet 2 tablet     co-enzyme Q-10 capsule 30 mg     morphine 0.1% in solosite topical gel     potassium chloride SA (K-DUR/KLOR-CON M) CR tablet 20-40 mEq     potassium chloride (KLOR-CON) Packet 20-40 mEq     potassium chloride 10 mEq in 100 mL intermittent infusion     potassium chloride 10 mEq in 100 mL intermittent infusion with 10 mg lidocaine     potassium chloride 20 mEq in 50 mL intermittent infusion     magnesium sulfate 2 g in NS intermittent infusion (PharMEDium or FV Cmpd)     magnesium sulfate 4 g in 100 mL sterile water (premade)     sodium phosphate 10 mmol in D5W intermittent infusion     sodium phosphate 15 mmol in D5W intermittent infusion     sodium phosphate 20 mmol in D5W intermittent infusion     sodium phosphate 25 mmol in D5W intermittent infusion     melatonin tablet 1 mg     traMADol (ULTRAM) half-tab 25 mg     predniSONE (DELTASONE) tablet 10 mg     MRI Elbow:  IMPRESSION:  1. No marrow signal abnormalities within the left elbow to suggest  osteomyelitis.  2. Diffuse soft tissue edema within the subcutaneous tissues about the  elbow joint, findings most consistent with cellulitis.  3. Diffuse multicompartment soft tissue tissue edema within the  muscles about the left elbow, with a focal area of nonenhancement,  centered in the brachialis muscle. Differential includes myositis,  including infectious or inflammatory etiology. A focal area of  myonecrosis is likely within the brachialis muscle. Correlate  clinically.  4. Partial-thickness tearing of the common extensor tendon.    I interviewed and examined the patient with the house staff.  I agree with the assessment and plan as documented.      Selvin Gandara MD, PhD  Professor  of Medicine  Division of Cardiology

## 2017-06-20 NOTE — PLAN OF CARE
Problem: Goal Outcome Summary  Goal: Goal Outcome Summary  SLP: Order received and chart reviewed. Consulted with RN who reports patient tolerating current dysphagia diet level 3 with thin liquid diet. Attempted swallow evaluation twice this date but patient unavailable both attempts with other providers. Patient to transfer to 5th floor this afternoon. Will reschedule swallow evaluation for 6-. RN aware.

## 2017-06-20 NOTE — PROGRESS NOTES
SPIRITUAL HEALTH SERVICES  SPIRITUAL ASSESSMENT Progress Note  [facility] 6C     REFERRAL SOURCE: Hospital  request    Made several attempts to visit. Patient was with medical staff and requested I return later; upon final attempt patient was asleep.    PLAN: Have re-entered hospital  request and plan to follow up tomorrow morning. Will follow patient through duration of stay.    Caitie Colón   Intern  Pager [phone]

## 2017-06-21 ENCOUNTER — APPOINTMENT (OUTPATIENT)
Dept: PHYSICAL THERAPY | Facility: CLINIC | Age: 57
DRG: 871 | End: 2017-06-21
Attending: INTERNAL MEDICINE
Payer: COMMERCIAL

## 2017-06-21 LAB
ANION GAP SERPL CALCULATED.3IONS-SCNC: 9 MMOL/L (ref 3–14)
BACTERIA SPEC CULT: NORMAL
BASOPHILS # BLD AUTO: 0 10E9/L (ref 0–0.2)
BASOPHILS NFR BLD AUTO: 0.4 %
BUN SERPL-MCNC: 39 MG/DL (ref 7–30)
C DIFF TOX B STL QL: NORMAL
CALCIUM SERPL-MCNC: 7.4 MG/DL (ref 8.5–10.1)
CAMPYLOBACTER GROUP BY NAT: NOT DETECTED
CHLORIDE SERPL-SCNC: 105 MMOL/L (ref 94–109)
CO2 SERPL-SCNC: 19 MMOL/L (ref 20–32)
CREAT SERPL-MCNC: 0.73 MG/DL (ref 0.52–1.04)
DIFFERENTIAL METHOD BLD: ABNORMAL
DIGOXIN SERPL-MCNC: 1.8 UG/L (ref 0.5–2)
ENTERIC PATHOGEN COMMENT: NORMAL
EOSINOPHIL # BLD AUTO: 0.1 10E9/L (ref 0–0.7)
EOSINOPHIL NFR BLD AUTO: 1.8 %
ERYTHROCYTE [DISTWIDTH] IN BLOOD BY AUTOMATED COUNT: 20.9 % (ref 10–15)
GFR SERPL CREATININE-BSD FRML MDRD: 83 ML/MIN/1.7M2
GLUCOSE BLDC GLUCOMTR-MCNC: 134 MG/DL (ref 70–99)
GLUCOSE BLDC GLUCOMTR-MCNC: 155 MG/DL (ref 70–99)
GLUCOSE BLDC GLUCOMTR-MCNC: 173 MG/DL (ref 70–99)
GLUCOSE BLDC GLUCOMTR-MCNC: 201 MG/DL (ref 70–99)
GLUCOSE BLDC GLUCOMTR-MCNC: 236 MG/DL (ref 70–99)
GLUCOSE SERPL-MCNC: 179 MG/DL (ref 70–99)
HCT VFR BLD AUTO: 28.3 % (ref 35–47)
HGB BLD-MCNC: 9.5 G/DL (ref 11.7–15.7)
IMM GRANULOCYTES # BLD: 0 10E9/L (ref 0–0.4)
IMM GRANULOCYTES NFR BLD: 0.4 %
INR PPP: 1.42 (ref 0.86–1.14)
LACTATE BLD-SCNC: 2.1 MMOL/L (ref 0.7–2.1)
LACTATE BLD-SCNC: 2.7 MMOL/L (ref 0.7–2.1)
LACTATE BLD-SCNC: 3.1 MMOL/L (ref 0.7–2.1)
LYMPHOCYTES # BLD AUTO: 0.5 10E9/L (ref 0.8–5.3)
LYMPHOCYTES NFR BLD AUTO: 16.8 %
Lab: NORMAL
MAGNESIUM SERPL-MCNC: 2 MG/DL (ref 1.6–2.3)
MCH RBC QN AUTO: 31.5 PG (ref 26.5–33)
MCHC RBC AUTO-ENTMCNC: 33.6 G/DL (ref 31.5–36.5)
MCV RBC AUTO: 94 FL (ref 78–100)
MICRO REPORT STATUS: NORMAL
MONOCYTES # BLD AUTO: 0.4 10E9/L (ref 0–1.3)
MONOCYTES NFR BLD AUTO: 13.2 %
NEUTROPHILS # BLD AUTO: 1.9 10E9/L (ref 1.6–8.3)
NEUTROPHILS NFR BLD AUTO: 67.4 %
NOROVIRUS I AND II BY NAT: NOT DETECTED
NRBC # BLD AUTO: 0 10*3/UL
NRBC BLD AUTO-RTO: 0 /100
PLATELET # BLD AUTO: 43 10E9/L (ref 150–450)
POTASSIUM SERPL-SCNC: 4.6 MMOL/L (ref 3.4–5.3)
RBC # BLD AUTO: 3.02 10E12/L (ref 3.8–5.2)
ROTAVIRUS A BY NAT: NOT DETECTED
SALMONELLA SPECIES BY NAT: NOT DETECTED
SHIGA TOXIN 1 GENE BY NAT: NOT DETECTED
SHIGA TOXIN 2 GENE BY NAT: NOT DETECTED
SHIGELLA SP+EIEC IPAH STL QL NAA+PROBE: NOT DETECTED
SODIUM SERPL-SCNC: 133 MMOL/L (ref 133–144)
SPECIMEN SOURCE: NORMAL
SPECIMEN SOURCE: NORMAL
VIBRIO GROUP BY NAT: NOT DETECTED
WBC # BLD AUTO: 2.8 10E9/L (ref 4–11)
YERSINIA ENTEROCOLITICA BY NAT: NOT DETECTED

## 2017-06-21 PROCEDURE — 80202 ASSAY OF VANCOMYCIN: CPT | Performed by: INTERNAL MEDICINE

## 2017-06-21 PROCEDURE — 27210195 ZZH KIT POWER PICC DOUBLE LUMEN

## 2017-06-21 PROCEDURE — P9047 ALBUMIN (HUMAN), 25%, 50ML: HCPCS | Performed by: INTERNAL MEDICINE

## 2017-06-21 PROCEDURE — 25000128 H RX IP 250 OP 636: Performed by: STUDENT IN AN ORGANIZED HEALTH CARE EDUCATION/TRAINING PROGRAM

## 2017-06-21 PROCEDURE — 83605 ASSAY OF LACTIC ACID: CPT | Performed by: INTERNAL MEDICINE

## 2017-06-21 PROCEDURE — 25000132 ZZH RX MED GY IP 250 OP 250 PS 637: Performed by: INTERNAL MEDICINE

## 2017-06-21 PROCEDURE — 93005 ELECTROCARDIOGRAM TRACING: CPT

## 2017-06-21 PROCEDURE — 87506 IADNA-DNA/RNA PROBE TQ 6-11: CPT | Performed by: INTERNAL MEDICINE

## 2017-06-21 PROCEDURE — 25000132 ZZH RX MED GY IP 250 OP 250 PS 637

## 2017-06-21 PROCEDURE — 25000128 H RX IP 250 OP 636: Performed by: INTERNAL MEDICINE

## 2017-06-21 PROCEDURE — 25000132 ZZH RX MED GY IP 250 OP 250 PS 637: Performed by: STUDENT IN AN ORGANIZED HEALTH CARE EDUCATION/TRAINING PROGRAM

## 2017-06-21 PROCEDURE — 25000125 ZZHC RX 250: Performed by: INTERNAL MEDICINE

## 2017-06-21 PROCEDURE — 12000001 ZZH R&B MED SURG/OB UMMC

## 2017-06-21 PROCEDURE — 25000131 ZZH RX MED GY IP 250 OP 636 PS 637: Performed by: STUDENT IN AN ORGANIZED HEALTH CARE EDUCATION/TRAINING PROGRAM

## 2017-06-21 PROCEDURE — 00000146 ZZHCL STATISTIC GLUCOSE BY METER IP

## 2017-06-21 PROCEDURE — 40000193 ZZH STATISTIC PT WARD VISIT: Performed by: REHABILITATION PRACTITIONER

## 2017-06-21 PROCEDURE — 80048 BASIC METABOLIC PNL TOTAL CA: CPT | Performed by: INTERNAL MEDICINE

## 2017-06-21 PROCEDURE — 27210436 ZZH NUTRITION PRODUCT SEMIELEM INTERMED CAN

## 2017-06-21 PROCEDURE — 99232 SBSQ HOSP IP/OBS MODERATE 35: CPT | Performed by: INTERNAL MEDICINE

## 2017-06-21 PROCEDURE — 85025 COMPLETE CBC W/AUTO DIFF WBC: CPT | Performed by: INTERNAL MEDICINE

## 2017-06-21 PROCEDURE — 83735 ASSAY OF MAGNESIUM: CPT | Performed by: INTERNAL MEDICINE

## 2017-06-21 PROCEDURE — 36592 COLLECT BLOOD FROM PICC: CPT | Performed by: INTERNAL MEDICINE

## 2017-06-21 PROCEDURE — 36415 COLL VENOUS BLD VENIPUNCTURE: CPT | Performed by: INTERNAL MEDICINE

## 2017-06-21 PROCEDURE — 80162 ASSAY OF DIGOXIN TOTAL: CPT | Performed by: INTERNAL MEDICINE

## 2017-06-21 PROCEDURE — 97530 THERAPEUTIC ACTIVITIES: CPT | Mod: GP | Performed by: REHABILITATION PRACTITIONER

## 2017-06-21 PROCEDURE — 87493 C DIFF AMPLIFIED PROBE: CPT | Performed by: INTERNAL MEDICINE

## 2017-06-21 PROCEDURE — 85610 PROTHROMBIN TIME: CPT | Performed by: INTERNAL MEDICINE

## 2017-06-21 PROCEDURE — 93010 ELECTROCARDIOGRAM REPORT: CPT | Mod: GC | Performed by: INTERNAL MEDICINE

## 2017-06-21 PROCEDURE — 36569 INSJ PICC 5 YR+ W/O IMAGING: CPT

## 2017-06-21 PROCEDURE — 99207 ZZC CDG-MDM COMPONENT: MEETS LOW - DOWN CODED: CPT | Performed by: INTERNAL MEDICINE

## 2017-06-21 RX ORDER — SIMETHICONE 80 MG
80 TABLET,CHEWABLE ORAL EVERY 6 HOURS PRN
Status: DISCONTINUED | OUTPATIENT
Start: 2017-06-21 | End: 2017-08-04 | Stop reason: HOSPADM

## 2017-06-21 RX ORDER — HEPARIN SODIUM,PORCINE 10 UNIT/ML
5-10 VIAL (ML) INTRAVENOUS EVERY 24 HOURS
Status: DISCONTINUED | OUTPATIENT
Start: 2017-06-21 | End: 2017-07-11

## 2017-06-21 RX ORDER — ALBUMIN (HUMAN) 12.5 G/50ML
25 SOLUTION INTRAVENOUS ONCE
Status: COMPLETED | OUTPATIENT
Start: 2017-06-21 | End: 2017-06-21

## 2017-06-21 RX ORDER — ONDANSETRON 4 MG/1
4 TABLET, FILM COATED ORAL EVERY 6 HOURS PRN
Status: COMPLETED | OUTPATIENT
Start: 2017-06-21 | End: 2017-06-22

## 2017-06-21 RX ORDER — PANTOPRAZOLE SODIUM 40 MG/1
40 TABLET, DELAYED RELEASE ORAL EVERY MORNING
Status: DISCONTINUED | OUTPATIENT
Start: 2017-06-21 | End: 2017-06-26

## 2017-06-21 RX ORDER — ONDANSETRON 2 MG/ML
4 INJECTION INTRAMUSCULAR; INTRAVENOUS EVERY 6 HOURS PRN
Status: DISCONTINUED | OUTPATIENT
Start: 2017-06-21 | End: 2017-08-04 | Stop reason: HOSPADM

## 2017-06-21 RX ORDER — DIGOXIN 125 MCG
125 TABLET ORAL DAILY
Status: DISCONTINUED | OUTPATIENT
Start: 2017-06-21 | End: 2017-06-21

## 2017-06-21 RX ORDER — ATENOLOL 25 MG/1
25 TABLET ORAL DAILY PRN
Status: DISCONTINUED | OUTPATIENT
Start: 2017-06-21 | End: 2017-06-25

## 2017-06-21 RX ORDER — HEPARIN SODIUM,PORCINE 10 UNIT/ML
5-10 VIAL (ML) INTRAVENOUS
Status: DISCONTINUED | OUTPATIENT
Start: 2017-06-21 | End: 2017-08-04 | Stop reason: HOSPADM

## 2017-06-21 RX ORDER — DIGOXIN 125 MCG
125 TABLET ORAL DAILY
Status: DISCONTINUED | OUTPATIENT
Start: 2017-06-22 | End: 2017-06-22

## 2017-06-21 RX ADMIN — SODIUM CHLORIDE 500 ML: 900 INJECTION, SOLUTION INTRAVENOUS at 13:26

## 2017-06-21 RX ADMIN — VANCOMYCIN HYDROCHLORIDE 1500 MG: 10 INJECTION, POWDER, LYOPHILIZED, FOR SOLUTION INTRAVENOUS at 11:37

## 2017-06-21 RX ADMIN — PREDNISONE 10 MG: 10 TABLET ORAL at 08:38

## 2017-06-21 RX ADMIN — INSULIN ASPART 1 UNITS: 100 INJECTION, SOLUTION INTRAVENOUS; SUBCUTANEOUS at 08:39

## 2017-06-21 RX ADMIN — SODIUM CHLORIDE 500 ML: 9 INJECTION, SOLUTION INTRAVENOUS at 17:08

## 2017-06-21 RX ADMIN — ALBUMIN (HUMAN) 25 G: 12.5 SOLUTION INTRAVENOUS at 14:56

## 2017-06-21 RX ADMIN — TRIAMCINOLONE ACETONIDE: 1 OINTMENT TOPICAL at 21:04

## 2017-06-21 RX ADMIN — GABAPENTIN 200 MG: 100 CAPSULE ORAL at 08:38

## 2017-06-21 RX ADMIN — VANCOMYCIN HYDROCHLORIDE 1500 MG: 10 INJECTION, POWDER, LYOPHILIZED, FOR SOLUTION INTRAVENOUS at 01:05

## 2017-06-21 RX ADMIN — Medication 2 G: at 10:23

## 2017-06-21 RX ADMIN — MULTIPLE VITAMINS W/ MINERALS TAB 1 TABLET: TAB at 08:37

## 2017-06-21 RX ADMIN — ERTAPENEM SODIUM 1 G: 1 INJECTION, POWDER, LYOPHILIZED, FOR SOLUTION INTRAMUSCULAR; INTRAVENOUS at 10:37

## 2017-06-21 RX ADMIN — TRIAMCINOLONE ACETONIDE: 1 OINTMENT TOPICAL at 08:39

## 2017-06-21 RX ADMIN — CALCIUM CARBONATE 600 MG (1,500 MG)-VITAMIN D3 400 UNIT TABLET 2 TABLET: at 08:38

## 2017-06-21 RX ADMIN — FOLIC ACID 1 MG: 1 TABLET ORAL at 08:38

## 2017-06-21 RX ADMIN — LIDOCAINE HYDROCHLORIDE 2 ML: 20 INJECTION, SOLUTION INFILTRATION; PERINEURAL at 14:34

## 2017-06-21 RX ADMIN — Medication 220 MG: at 08:38

## 2017-06-21 RX ADMIN — SODIUM CHLORIDE, PRESERVATIVE FREE 5 ML: 5 INJECTION INTRAVENOUS at 23:31

## 2017-06-21 RX ADMIN — LEVOFLOXACIN 500 MG: 5 INJECTION, SOLUTION INTRAVENOUS at 08:47

## 2017-06-21 RX ADMIN — GABAPENTIN 200 MG: 100 CAPSULE ORAL at 14:56

## 2017-06-21 RX ADMIN — ATENOLOL 50 MG: 50 TABLET ORAL at 08:39

## 2017-06-21 RX ADMIN — Medication 2 G: at 21:21

## 2017-06-21 RX ADMIN — APIXABAN 2.5 MG: 2.5 TABLET, FILM COATED ORAL at 08:37

## 2017-06-21 RX ADMIN — APIXABAN 2.5 MG: 2.5 TABLET, FILM COATED ORAL at 21:04

## 2017-06-21 RX ADMIN — CETIRIZINE HYDROCHLORIDE 10 MG: 10 TABLET, FILM COATED ORAL at 08:38

## 2017-06-21 RX ADMIN — ONDANSETRON HYDROCHLORIDE 4 MG: 4 TABLET, FILM COATED ORAL at 08:47

## 2017-06-21 RX ADMIN — INSULIN ASPART 2 UNITS: 100 INJECTION, SOLUTION INTRAVENOUS; SUBCUTANEOUS at 17:32

## 2017-06-21 RX ADMIN — OXYCODONE HYDROCHLORIDE AND ACETAMINOPHEN 500 MG: 500 TABLET ORAL at 08:39

## 2017-06-21 RX ADMIN — Medication 25000 UNITS: at 08:37

## 2017-06-21 RX ADMIN — GABAPENTIN 200 MG: 100 CAPSULE ORAL at 21:04

## 2017-06-21 RX ADMIN — TRAMADOL HYDROCHLORIDE 25 MG: 50 TABLET, FILM COATED ORAL at 03:52

## 2017-06-21 RX ADMIN — INSULIN ASPART 2 UNITS: 100 INJECTION, SOLUTION INTRAVENOUS; SUBCUTANEOUS at 13:34

## 2017-06-21 RX ADMIN — Medication 30 MG: at 08:37

## 2017-06-21 RX ADMIN — SODIUM CHLORIDE 500 ML: 9 INJECTION, SOLUTION INTRAVENOUS at 15:58

## 2017-06-21 RX ADMIN — PANTOPRAZOLE SODIUM 40 MG: 40 TABLET, DELAYED RELEASE ORAL at 08:38

## 2017-06-21 RX ADMIN — Medication 1 MG: at 21:04

## 2017-06-21 NOTE — PROVIDER NOTIFICATION
Gold team #[id number] notified of critical platelet count of 43. In addition, TF held due to pt nausea and dry heaving. Request made of MD to order antinausea medications.

## 2017-06-21 NOTE — PROGRESS NOTES
Calorie Counts  Intake recorded for: 6/20 Kcals: 765  Protein: 56g  # Meals Recorded: 100% cheerios, 2 milks, soft fruit cup, 50% banana, chicken breast w/ gravy, pears, deli sandwich w/ sliced beef & cheese  # Supplements Recorded: 100% 1 packet BeneProtein

## 2017-06-21 NOTE — PLAN OF CARE
Problem: Goal Outcome Summary  Goal: Goal Outcome Summary  SLP: Session (evaluation) canceled. Attempted to see Pt for swallow eval this AM, however, Pt refused to participate despite maximum encouragement. Pt reports being too tired to participate. ST to reschedule for tomorrow.

## 2017-06-21 NOTE — PLAN OF CARE
Problem: Goal Outcome Summary  Goal: Goal Outcome Summary  Patient arrived to unit from 6C, with belongings. Contact and enteric precautions (stool sample still needed), pt wearing life vest. TF pump and formula with patient, to be started at 20ml/hr. Pharm tech to hand deliver morphine topical gel. Oriented to room, VS checked.

## 2017-06-21 NOTE — PROGRESS NOTES
[facility], [place]   Hospitalist Daily Progress Note                                                 Date of Admission:2017  ___________________________________  INTERVAL HISTORY (24 Hrs)/SUBJECTIVE:   Last 24 hr events, care team notes reviewed.     Reports dry heaves, nausea.   Otherwise overall stable.   No fever or chills  No cp or sob.     ROS: 4 point ROS neg other than the symptoms noted above in the interval history.    OBJECTIVE :   VITALS:    Temp:  [96.9  F (36.1  C)-99.4  F (37.4  C)] 99.4  F (37.4  C)  Pulse:  [108] 108  Heart Rate:  [] 110  Resp:  [18] 18  BP: ()/(53-88) 108/53  SpO2:  [93 %-97 %] 93 %    Temp (24hrs), Av.5  F (36.4  C), Min:95.8  F (35.4  C), Max:99.4  F (37.4  C)      Wt Readings from Last 5 Encounters:   17 78.6 kg (173 lb 3.2 oz)   17 75.7 kg (166 lb 12.8 oz)   17 72.8 kg (160 lb 8 oz)   17 64.4 kg (141 lb 15.6 oz)   17 61.6 kg (135 lb 12.8 oz)        Intake/Output Summary (Last 24 hours) at 17 0752  Last data filed at 17 0647   Gross per 24 hour   Intake              670 ml   Output                0 ml   Net              670 ml       PHYSICAL EXAM:  General: alert, interactive, NAD, Nasal feeding tube.   HEENT: AT/NC,  Moist MM  Neck: Supple,  Respi/Chest: Non labored. Clear BL,   CVS/Heart: S1S2 irregular. Life vest+  bl pedal edema++  Gi/Abd: Soft, non tender, non distended,  MSK/Ext: Distal pulses 2+.     Neuro: AO x 4,      Medications:   I have reviewed this patient's current medications.    Data:   All laboratory and imaging data in the past 24 hours reviewed:    LABS:  CMP  Recent Labs  Lab 17  0623 17  0709 17  1612 17  0608 17  0609  06/15/17  0556    136 136 135 136  < > 136   POTASSIUM 4.6 4.6 4.7 4.6 4.0  < > 4.6   CHLORIDE 105 105 105 104 103  < > 102   CO2 19* 24 24 21 28  < > 28   ANIONGAP 9 7 7 10 5  < > 7   * 79 149* 159* 141*   < > 105*   BUN 39* 32* 32* 31* 27  < > 30   CR 0.73 0.74 0.71 0.66 0.66  < > 0.71   GFRESTIMATED 83 81 85 >90Non  GFR Calc >90Non  GFR Calc  < > 85   GFRESTBLACK >90African American GFR Calc >90African American GFR Calc >90African American GFR Calc >90African American GFR Calc >90African American GFR Calc  < > >90African American GFR Calc   VIKTORIYA 7.4* 7.5* 7.5* 7.8* 7.4*  < > 7.6*   MAG 2.0 1.7 1.8  --  1.7  --  2.1   PROTTOTAL  --   --   --  5.4* 4.6*  --  4.9*   ALBUMIN  --   --   --  1.8* 1.8*  --  1.8*   BILITOTAL  --   --   --  0.7 0.8  --  0.8   ALKPHOS  --   --   --  180* 159*  --  168*   AST  --   --   --  41 34  --  31   ALT  --   --   --  63* 60*  --  73*   < > = values in this interval not displayed.  CBC  Recent Labs  Lab 06/21/17 0623 06/20/17  0709 06/19/17 1612 06/19/17  0608   WBC 2.8* 2.8* 3.1* 2.5*   RBC 3.02* 2.73* 2.79* 3.16*   HGB 9.5* 8.7* 9.0* 10.2*   HCT 28.3* 25.7* 26.4* 30.6*   MCV 94 94 95 97   MCH 31.5 31.9 32.3 32.3   MCHC 33.6 33.9 34.1 33.3   RDW 20.9* 21.0* 20.6* 20.5*   PLT 43* 50* 44* 51*     INR  Recent Labs  Lab 06/21/17  0623 06/20/17  0709 06/19/17  1612 06/16/17  0609   INR 1.42* 1.29* 1.44* 1.36*     Unresulted Labs Ordered in the Past 30 Days of this Admission     Date and Time Order Name Status Description    6/19/2017 0725 Anaerobic bacterial culture Preliminary     6/19/2017 0724 Wound Culture Aerobic Bacterial Preliminary     6/19/2017 0719 Blood culture Preliminary     6/19/2017 0719 Blood culture Preliminary     5/31/2017 1012 s100 B: Laboratory Miscellaneous Order In process     5/29/2017 1046 Adalimumab Activity and Neutralizing Antibodies: Laboratory Miscellaneous Order In process           Echo: 06/05:     Limited, technically difficult study. Extremely difficult acoustic windows  despite the use of contrast for endcardial border definition.  Borderline (EF 50-55%) reduced left ventricular function is present.  Additionally, the  abnormal non-specific septal motion and irregular cardiac  cycle contribute to the difficulty in accurately measuring LVEF.  The IVC is dilated at 2.5 cm, c/w increased RAP. The RAP is in excess of 15  mmHg.    ASSESSMENT & PLAN :    Amelia Michel is a 56 year old woman with hx of VSD repair (1969), Rheumatoid arthritis, and a recent diagnosis of Afib/Aflutter/SVT, who presented to [facility] on 5/29 after Out of Hospital cardiac arrest with shockable rhythm. After ROSC in the field, she was admitted from 5/29-6/15, extubated 6/4. While at ARU (6/15-6/19), pt developed worsening LUE wound pain and fever, and was admitted back to [facility] for further workup. Admitted 6/19/2017         Changes Today:    - Patient with lactic acidosis 3.1 suspect related to hypovolemia in setting of sepsis plus poor intake.   Given 500 ml NSS bolus plus 25 gm albumin -> fu 2.7-> ordered another total 1000 ml bolus. Vitals stable except tachycardia 110-120s.      - Digoxin level:1.8, resume lower dose 125 mcg daily. Paged cardiology for further recs [phone], a/w reply.   - atenolol 25 mg daily prn for HR>120    -ID consult for antibiotics recommendations requested. Dc levaquin for now. Continue iv Vanco and ertapenem. Further per ID    - Dry heaves: Added Protonix 40 mg daily. Simethicone prn. Iv zofran and compazine prn.              # Sepsis likely from L UE wound infection. On adm: SIRS +  (Fever, leukopenia, tachcyardia)   C.diff neg.   Enteric panel, stool: neg.   UC: mixed urogenital harshal.   WC:   Heavy growth Enterobacter cloacae complex   Moderate growth Enterococcus faecalis   Moderate growth Coagulase negative Staphylococcus   susceptibility reviewed.  BC: no growth so far.       Left UE Wound with concern for cellulitis +/- myositis with potential area of myonecrosis:    L UE wound onset around time of cardiac arrest, unclear etiology (?trauma during code), exacerbated by fluid extravasation from IV during last admission. Over course  of rehab, pt has noticed increasing pain in L UE with therapy and spiked fevers to 101F and 102F on 6/18 and 6/19, respectively. On admission, wound appears erythematous, with eschar and fibrinous material overlying wound (no purulent drainage), tender to palpation.  6/19 L Elbow XR:  Negative for osteo, diffuse subcutaneous soft tissue stranding, possibly representative of cellulitis   6/19 L Elbow MRI: Diffuse cellulitis and multicompartment soft tissue tissue edema within the muscles about the left elbow, with a focal area of nonenhancement,  centered in the brachialis muscle. Differential includes myositis,including infectious or inflammatory etiology. A focal area of myonecrosis is likely within the brachialis muscle. Correlate clinically.    --Started empirically on iv Vancomycin/Ertapenem and added levofloxacin 6/20 for pseudomonal coverage  --General Surgery: 06/19: no surgical indication.   --Pain control: Tramadol 25mg q6h prn, Continue morphine gel around edges of wound for pain   --WOCN on board. Ct wound care.     -- 6/21/2017: ID consult requested for antibiotics management.     Other issues:       # Out of Hospital Cardiac Arrest and Cardiogenic Shock:   - ICD will be deferred at this time in lieu of LUE extravasation/high risk of infection. 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 Plts noted to decrease from 104 to as low as 15 during recent hospitalization requiring transfusion w/ chronically low in the 's as outpatient.  -- If this is chronic ITP, no need for treatment unless plts drift < 30s  -- Continue to hold RA meds for now  -- Continue Apixaban 2.5 mg BID for anticoagulation. This will need to be held 2 days prior to ICD placement  after discharge (this will be deferred given acute infection).      # Atrial fibrillation:   Known Afib w/ RVR from prior hospitalization w/ rates difficult to control despite being on Metoprolol 100 mg XL and Diltiazem. She had 1 DCCV for which she reverted within 24hrs. Sotolol was also attempted but patient did not tolerate the medication due to nausea and vomiting and subsequently failed Metoprolol and Propranolol for the same issue. She was discharged during this previous hospitalization on Amiodarone and was also started on Apixaban. Patient had no evidence of ventriclar arrhythmia prior to discharge. RFA was discuss but was defered as patient had a UTI at the time with ESBL. Given difficulty to control rates, an CMR was performed to r/o inflammation or scar as cause of arrhythmia. CMR did not show atrial inflammation or abnormalities except for enlargement. 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   - Rhythm has been spontaneously converting from NSR to Afib w/ RVR w/ rates into the 130-140's.   - Continue Atenolol 50 mg daily. Plus 25 mg daily prn for HR>120    - Digoxin discontinued 6/20 for trough of 3.4. Digoxin level today 1.8. Resume at lower dose 125 mcg daily. Cardiology text paged, a/w reply. Fu level.         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 Now on 10 mg daily, taper to 7.5 mg on 6/29, then to 5 mg daily after 2 weeks if continues to have low disease activity  -- Continue to hold HCQ and MTX until outpatient follow-up  -- Follow-up with [facility]ology clinic Dr. Conor Cunha in 4-6 weeks after discharge      # Severe Critical Illness Myopathy:   Significant deconditioning w/ median neuropathies most likely localized to the wrists and ulnar neuropathies  most likely localized to the elbows secondary to RA.  -- Ongoing extensive PT/OT/SLP     # ESBL UTI: Completed course abx.      # Malnutrition:   -- Dysphagia Level 3 diet w/ thin liquids. Straws ok. Pt should be upright, alert, small bites/sips consumed and alternate consistencies.  -- Nutrition recommendations are to continue Peptamen 1.5 Tube feeds to 40 ml/hr x 11 hours which will provide 440 ml TF, 660 kcals, and 30 g protein daily. TFs may be adjusted pending oral intake. Once kcal counts reveal that patient is consuming at least 900 kcals and 45 g protein daily on average can discontinue.  -- Water flushes as needed.  -- Nutrition consult placed      Consulting Services: None (general surgery did not feel surgical intervention required for LUE wound)     CODE: Full  DVT Ppx: Apixaban  Diet/Fluids: Cycled tube feeds, calorie counts, DD3 with thin liquids, nutrition following  - IVF: None   - Electrolyte Protocol:  Yes  - Telemetry: Yes; Life Vest needs to be worn at all times    Disposition Plan   Expected discharge in 2-3 days to likely back to ARU once medically stable. fu PMR consult requested as per insurnance requirement per CC .     Entered: Johnie Chino 06/21/2017, 3:57 PM        Pt's care was discussed with bedside RN and patient during Care Team Rounds.     Addendum:     Patient seen again around 4.30 pm, appears to be doing better. Tolerating ivfluids. No cp or sob.  at bedside. Q answered.   Plan updated.     Signed out to Cross Cover team      Johnie hCino MD   Hospitalist (Internal Medicine)  Pager: [phone].

## 2017-06-21 NOTE — PROGRESS NOTES
"SPIRITUAL HEALTH SERVICES  SPIRITUAL ASSESSMENT Progress Note  [facility] ([place]) 5B     PRIMARY FOCUS:     Goals of care    Symptom/pain management    Emotional/spiritual/Christian distress    Support for coping    REFERRAL SOURCE: Hospital  Request    ILLNESS CIRCUMSTANCES:   Reviewed documentation. Reflective conversation shared with Amelia which integrated elements of illness and family narratives.     Context of Serious Illness/Symptom(s)  albert Cisneros spoke of being tired; having dry heaves; and needed to get temperatures under control.    Resources for Support - None Discussed.    DISTRESS:     Emotional/Spiritual/Existential Distress  albert Cisneros stated, \"I'm feeling depressed.  I want to wake up and for this all to be over.\"  - Amelia stated that her illness has been ongoing since Memorial Day.  When I asked when surgery was planned, Amelia stated, \"Not for another three weeks.\"  - Amelia is growing impatient.    Voodoo Distress - None Discussed.    Social/Cultural/Economic Distress - None Discussed.    SPIRITUAL/Scientologist COPING:     Spiritism/Jennifer  albert Cisneros stated she has grown up Gnosticism, but is indifferent to Restorationist.    Spiritual Practice(s) - None Discussed    Emotional/Relational/Existential Connections  o Amelia's  Romeo and sister have visited her in the hospital.    GOALS OF CARE:    Goals of Care - None Discussed.    Meaning/Sense-Making  albert Cisneros stated, \"I want a straight path for this to be over, no more curves in the path forward.\"    PLAN: Continue to visit pt 1-2 x's a week while residing on unit.    Andrew Wang   Intern  Pager [phone]    "

## 2017-06-21 NOTE — PHARMACY-VANCOMYCIN DOSING SERVICE
Pharmacy Vancomycin Note  Date of Service 2017  Patient's  1960   56 year old, female    Indication: Skin and Soft Tissue Infection; possible myositis  Goal Trough Level: 15-20 mg/L  Day of Therapy: 3  Current Vancomycin regimen:  1500 mg IV q12h    Current estimated CrCl = Estimated Creatinine Clearance: 106.8 mL/min (based on Cr of 0.73).    Creatinine for last 3 days  2017:  6:08 AM Creatinine 0.66 mg/dL;  4:12 PM Creatinine 0.71 mg/dL  2017:  7:09 AM Creatinine 0.74 mg/dL  2017:  6:23 AM Creatinine 0.73 mg/dL    Recent Vancomycin Levels (past 3 days)  No results found for requested labs within last 72 hours.    Vancomycin IV Administrations (past 72 hours)                   vancomycin (VANCOCIN) 1,500 mg in NaCl 0.9 % 250 mL intermittent infusion (mg) 1,500 mg New Bag 17 1137     1,500 mg New Bag  0105     1,500 mg New Bag 17 1306     1,500 mg New Bag  0004    vancomycin (VANCOCIN) 1,500 mg in NaCl 0.9 % 250 mL intermittent infusion (mg) 1,500 mg New Bag 17 1205                Nephrotoxins and other renal medications (Future)    Start     Dose/Rate Route Frequency Ordered Stop    17 0000  vancomycin (VANCOCIN) 1,500 mg in NaCl 0.9 % 250 mL intermittent infusion      1,500 mg  over 90 Minutes Intravenous EVERY 12 HOURS 17 1458               Contrast Orders - past 72 hours (72h ago through future)    Start     Dose/Rate Route Frequency Ordered Stop    17  gadobutrol (GADAVIST) injection 7.5 mL      7.5 mL Intravenous ONCE 17          Interpretation of levels and current regimen:  Trough level is pending for 2300 on .    Has serum creatinine changed > 50% in last 72 hours: No    Urine output:  good urine output    Renal Function: Stable    Plan:  1.  Reduce dose to 1250mg q12h - initial dose was 19mg/kg q12h.   2. Trough pending for tonight (from the 19mg/kg regimen) - will adjust dose accordingly tomorrow  morning.  3. Serum creatinine levels will be ordered daily for the first week of therapy and at least twice weekly for subsequent weeks.      Bre Lacy, PharmD        .

## 2017-06-21 NOTE — CONSULTS
PM&R CONSULT NOTE     We were asked by Dr Sherman to see this patient to assess for rehabilitation   Date of encounter 06/21/17  Location 5B 4199  Patient seen and examined today. Coordinated with team.     RECOMMENDATION   Amelia Michel is well known to the PM&R service through the consult dated 6/8. She was transferred from the ARU due to LUE weakness and pain. diagnosed with cellulitis and myositis, she has been afebrile following starting levofloxacin.   Notes fatigue and weakness   She is motivated and geared up to return to the intensity of the ARU setting   Recommend ARU upon stabilization. She will have the same goals as determined by the H&P dated 6/16 by Dr Rubi.   Anticipated rehab will consist of the following   90 minutes each of PT and OT  Rehabilitation nursing to closely address the LUE wound and monitor for worsening cellulitis.   Close management by physiatry  Hospitalist consult    ELOS is 2 weeks   She is agreeable to the plan of care     Thank you for consulting the PM&R Department.   For any questions, please feel free to page me at [phone]       Priscilla Shanks MD   Department of PM&R      HPI/ACTIVE ISSUES   Amelia Michel is a 56 year old Right handed female, admitted to [facility] on 6/19/2017  She had been seen by PM&R service on 6/8/2017      Mrs. Amelia Michel is a 56-year old female with a PMHx of VSD repair (1969), RA, on chronic immunosuppression,  and a recent diagnosis of AFib/AFlutter/SVT who presented to [facility] initially on 05/29 after OOH cardiac arrest with shockable rhythm.   After ROSC in the field, she was transferred to [facility] for further management on 5/29/2017. Underwent therapeutic hypothermia in CV ICU. Was noted to have epitliform activity on EEG without actual seizures. Placed on Keppra ppx. Seen by Neurology and underwent EEG consistent with diffuse encephalopathy, with Left posterior hemisphere epielptiiform activity continues indicating focal cortical  irritability in this region. No overt seizures. She required pressure support.  Extubated 06/04. Complicated by respiratory failure / aspiration pneumonica, ischemic liver injury with secondary coagulopathy, CELSA, and electrolyte imbalance     She was seen by PM&R service on 6/8 and recommended ARU setting as she improved beyond the initial assessment on the 8th of June. She was discharged to ARU, but was transferred back here on 6/19/17 due to worsening pain in LUE while performing therapeutic exercises, and fever.   LUE wound developed around the time of cardiac arrest, differential diagnosis is cellulitis with myositis and possible area of myonecrosis. She is placed on levofloxacin. She was seen by surgery to rule out possible debridement. She has been afebrile on the 20th and 21st.   Noted for a brief PSV, tachycardic, required a bolus of fluid for progressive tachycardia to 140s. Received two units of platelets   She underwent EEG, Study with clear improvement compared to initial studies in this series of studies. Epileptiform activity continues indicating increased seizure tendency but no seizures recorded.      Active Diet Order      Dysphagia Diet Level 3 Advanced Thin Liquids (water, ice chips, juice, milk gelatin, ice cream, etc)    PREVIOUS LEVEL OF FUNCTION   She was independent with basic mobility and basic ADL's prior to admit.     Functionally, Amelia Michel was participating in the therapies in a motivated fashion while in the ARU. No further reports from PT/OT while in [facility].      REVIEW OF THE SYSTEMS   Total of ten systems reviewed, pertinent positives and negatives as follows  She reports pain in the LUE, some improvement   Ongoing numbness in the left thumb and index finger   Notes weakness in the her left pinching ability   Denies chest pain fever chills rigors  Denies any shortness of breath   Denies any nausea or vomiting   Denies any lightheadedness or dizziness   On dysphagia diet 3 with  "thins   Denies any new rashes   Mood euphoric   Voiding without any problems, no incontinence   Slept well last night   Notes significant fatigue   Remainder of the review of the systems was negative      Physical exam    Vital signs:  Temp: 98.4  F (36.9  C) Temp src: Oral BP: 112/68 Pulse: 129 Heart Rate: 110 Resp: 18 SpO2: 92 % O2 Device: None (Room air)   Height: 148 cm (4' 10.25\") Weight: 78.6 kg (173 lb 3.2 oz)  Estimated body mass index is 35.89 kg/(m^2) as calculated from the following:    Height as of this encounter: 1.48 m (4' 10.25\").    Weight as of this encounter: 78.6 kg (173 lb 3.2 oz).  She is lying in bed   Tremulousness noted   LUE in a dressing   Shoulder ROM is intact and functional   Weakness in the LUE at 3+/5 except unable flex her index finger and unable to make a pinching . Wrist flexion and extension intact   BLE 3/5 proximally and 4/5 distally   BLE in ace wraps   HEENT NC AT PRTL EOM good   Neck supple  Cognitively she is intact, good insight and executive functioning          REVIEWED THE MEDICATIONS BELOW   Current Facility-Administered Medications   Medication     ondansetron (ZOFRAN) tablet 4 mg     prochlorperazine (COMPAZINE) injection 5-10 mg     ondansetron (ZOFRAN) injection 4 mg     pantoprazole (PROTONIX) EC tablet 40 mg     simethicone (MYLICON) chewable tablet 80 mg     levofloxacin (LEVAQUIN) infusion 500 mg     dextrose 10 % 1,000 mL infusion     lidocaine 1 % 0.5-5 mL     lidocaine (LMX4) kit     sodium chloride (PF) 0.9% PF flush 5-50 mL     heparin lock flush 10 UNIT/ML injection 2-5 mL     apixaban ANTICOAGULANT (ELIQUIS) tablet 2.5 mg     ascorbic acid (VITAMIN C) tablet 500 mg     atenolol (TENORMIN) tablet 50 mg     beta carotene capsule 25,000 Units     cetirizine (zyrTEC) tablet 10 mg     ertapenem (INVanz) 1 g vial to attach to  mL bag     folic acid (FOLVITE) tablet 1 mg     gabapentin (NEURONTIN) capsule 200 mg     melatonin tablet 3 mg     morphine " 0.1% in solosite topical gel 2 g     multivitamin, therapeutic with minerals (THERA-VIT-M) tablet 1 tablet     triamcinolone (KENALOG) 0.1 % ointment     vancomycin (VANCOCIN) 1,500 mg in NaCl 0.9 % 250 mL intermittent infusion     zinc sulfate (ZINCATE) capsule 220 mg     lidocaine 1 % 1 mL     lidocaine (LMX4) kit     sodium chloride (PF) 0.9% PF flush 3 mL     sodium chloride (PF) 0.9% PF flush 3 mL     medication instruction     acetaminophen (TYLENOL) tablet 650 mg     acetaminophen (TYLENOL) Suppository 650 mg     Patient is already receiving anticoagulation with heparin, enoxaparin (LOVENOX), warfarin (COUMADIN)  or other anticoagulant medication     glucose 40 % gel 15-30 g    Or     dextrose 50 % injection 25-50 mL    Or     glucagon injection 1 mg     insulin aspart (NovoLOG) inj (RAPID ACTING)     insulin aspart (NovoLOG) inj (RAPID ACTING)     naloxone (NARCAN) injection 0.1-0.4 mg     calcium-vitamin D (CALTRATE) 600-400 MG-UNIT per tablet 2 tablet     co-enzyme Q-10 capsule 30 mg     morphine 0.1% in solosite topical gel     potassium chloride SA (K-DUR/KLOR-CON M) CR tablet 20-40 mEq     potassium chloride (KLOR-CON) Packet 20-40 mEq     potassium chloride 10 mEq in 100 mL intermittent infusion     potassium chloride 10 mEq in 100 mL intermittent infusion with 10 mg lidocaine     potassium chloride 20 mEq in 50 mL intermittent infusion     magnesium sulfate 2 g in NS intermittent infusion (PharMEDium or FV Cmpd)     magnesium sulfate 4 g in 100 mL sterile water (premade)     sodium phosphate 10 mmol in D5W intermittent infusion     sodium phosphate 15 mmol in D5W intermittent infusion     sodium phosphate 20 mmol in D5W intermittent infusion     sodium phosphate 25 mmol in D5W intermittent infusion     melatonin tablet 1 mg     traMADol (ULTRAM) half-tab 25 mg     predniSONE (DELTASONE) tablet 10 mg       Results for orders placed or performed during the hospital encounter of 06/19/17 (from the past  24 hour(s))   Social Work IP Consult    Narrative    Ruby Ariza, MSW     2017  3:55 PM  Social Work: Assessment with Discharge Plan    Patient Name:  Fidelia Middleton  :  1960  Age:  56 year old  MRN:  [id number]  Risk/Complexity Score:  Filed Complexity Screen Score: 8  Completed assessment with:  Pt and spouse at bedside    Presenting Information   Reason for Referral:  Discharge plan, Return to ARU  Date of Intake:  2017  Referral Source:  Chart Review  Decision Maker:  Pt when able  Alternate Decision Maker:  Spouse  Health Care Directive:  Declined completing today  Living Situation:  House  Previous Functional Status:  Independent  Patient and family understanding of hospitalization:  Pt states   she is wanting to return to ARU after getting her medical cares   resolved.  Cultural/Language/Spiritual Considerations:  None reported for   today.  Adjustment to Illness:  Pt is in a positive mood and continues to   be motivated for ARU.    Physical Health  Reason for Admission:  No diagnosis found.  Services Needed/Recommended:  ARU    Mental Health/Chemical Dependency  Diagnosis:  NA  Support/Services in Place:  NA  Services Needed/Recommended:  NA    Support System  Significant relationship at present time:  Spouse at bedside  Family of origin is available for support:  Yes  Other support available:  Yes  Gaps in support system:  None reported  Patient is caregiver to:  None     Provider Information   Primary Care Physician:  Comfort Sabillon   [phone]   Clinic:  [facility] [address] / [facility]   [id number]      :  SHELBY    Financial   Income Source:  Retired  Financial Concerns:  None  Insurance:    Payor/Plan Subscriber Name Rel Member # Group #   [employer]* FIDELIA MIDDLETON  [id number]       [id number]       Discharge Plan   Patient and family discharge goal:  Return to ARU  Provided education on discharge plan:  YES  Patient  agreeable to discharge plan:  YES  A list of Medicare Certified Facilities was provided to the   patient and/or family to encourage patient choice. Patient's   choices for facility are:    [facility]  *[phone]    Will NH provide Skilled rehabilitation or complex medical:  YES  General information regarding anticipated insurance coverage and   possible out of pocket cost was discussed. Patient and patient's   family are aware patient may incur the cost of transportation to   the facility, pending insurance payment: YES  Barriers to discharge:  Medical stability    Discharge Recommendations   Anticipated Disposition:  Facility:  ARU, referral started with   admission  Transportation Needs:  Medical:  Wheelchair  Name of Transportation Company and Phone:  [employer]     Additional comments   SW met with pt and spouse to complete new assessment as pt was a   recent discharge from [facility] to ARU.  Pt states that she would like   to return to ARU and is motivated to continue therapies.  Pt and   spouse report no change from prior assessment.  Pt states she is   agreeable to another PMR consult as this may be needed for an   insurance auth to return to ARU.  SW discussed referral with    admissions liaison who will be working with insurance for   readmission.  SW to follow for return to ARU.    DENNIS Larsen, APSW  [address]   Phone: [phone]  Pager: [phone]  Unit: [phone]       Glucose by meter   Result Value Ref Range    Glucose 134 (H) 70 - 99 mg/dL   Basic metabolic panel   Result Value Ref Range    Sodium 133 133 - 144 mmol/L    Potassium 4.6 3.4 - 5.3 mmol/L    Chloride 105 94 - 109 mmol/L    Carbon Dioxide 19 (L) 20 - 32 mmol/L    Anion Gap 9 3 - 14 mmol/L    Glucose 179 (H) 70 - 99 mg/dL    Urea Nitrogen 39 (H) 7 - 30 mg/dL    Creatinine 0.73 0.52 - 1.04 mg/dL    GFR Estimate 83 >60 mL/min/1.7m2    GFR Estimate If Black >90   GFR Calc   >60 mL/min/1.7m2    Calcium 7.4  (L) 8.5 - 10.1 mg/dL   CBC with platelets differential   Result Value Ref Range    WBC 2.8 (L) 4.0 - 11.0 10e9/L    RBC Count 3.02 (L) 3.8 - 5.2 10e12/L    Hemoglobin 9.5 (L) 11.7 - 15.7 g/dL    Hematocrit 28.3 (L) 35.0 - 47.0 %    MCV 94 78 - 100 fl    MCH 31.5 26.5 - 33.0 pg    MCHC 33.6 31.5 - 36.5 g/dL    RDW 20.9 (H) 10.0 - 15.0 %    Platelet Count 43 (LL) 150 - 450 10e9/L    Diff Method Automated Method     % Neutrophils 67.4 %    % Lymphocytes 16.8 %    % Monocytes 13.2 %    % Eosinophils 1.8 %    % Basophils 0.4 %    % Immature Granulocytes 0.4 %    Nucleated RBCs 0 0 /100    Absolute Neutrophil 1.9 1.6 - 8.3 10e9/L    Absolute Lymphocytes 0.5 (L) 0.8 - 5.3 10e9/L    Absolute Monocytes 0.4 0.0 - 1.3 10e9/L    Absolute Eosinophils 0.1 0.0 - 0.7 10e9/L    Absolute Basophils 0.0 0.0 - 0.2 10e9/L    Abs Immature Granulocytes 0.0 0 - 0.4 10e9/L    Absolute Nucleated RBC 0.0    Digoxin level   Result Value Ref Range    Digoxin Level 1.8 0.5 - 2.0 ug/L   INR   Result Value Ref Range    INR 1.42 (H) 0.86 - 1.14   Magnesium   Result Value Ref Range    Magnesium 2.0 1.6 - 2.3 mg/dL   Glucose by meter   Result Value Ref Range    Glucose 155 (H) 70 - 99 mg/dL       Total of 65 min spent in the encounter, more than 50% in coordination and counseling on topics listed in the HPI

## 2017-06-21 NOTE — PROGRESS NOTES
[facility], [place], [place]   Antimicrobial Management Team (AMT) Note              To: Gold 2 - Medicine  Unit: 5B  Allergies   Allergen Reactions     Penicillins      Tape [Adhesive Tape] Rash       Brief Summary: Amelia Michel is a 56 yr old female with PMH of VSD repair (1969), rheumatoid arthritis, afib/flutter, cardiac arrest with prolonged hospital stay 5/29-6/16 subsequently requiring ARU placement. Since discharging to the ARU, patient has developed LUE wound pain, fever, and chills requiring readmission to [facility] on 6/19. Denied any cough or dysuria    Interval History  6/19:  Febrile 102.6F, tachycardic (130s), WBC 2.5, lactic acid 1.8, CRP 98   UA - nitrite positive, few bacteria, no LE, WBC 2   Urine culture - no growth   Wound culture - enterobacter cloacae (S - cefepime, alysha, FQs, AGs, bactrim)   Blood culture x2 - no growth   Chest XR - unremarkable   Elbow XR - no osteo; soft tissue stranding could represent cellulitis   Elbow MRI - soft tissue edema within subcutaneous tissues consistent with cellulitis; Myositis with likely myonecrosis within the brachialis muscle.    Initiated on vanco/ertapenem  6/20:  Added levofloxacin  6/21: C diff - negative   Enteric panel - pending     Assessment:   Skin and soft tissue infection: Amelia Michel developed a left upper extremity wound secondary to IV extravasation in previous hospitalization. While at the ARU, the patient developed a fever and tachycardia and was transferred to [facility] for a possible cellulitis. Imaging of the left arm is consistent with cellulitis, myositis with likely myonecrosis. A wound swab was obtained and shows few GPC and rare GNR on gram stain with enterobacter cloacae growing. Would elect to treat as cellulitis with coverage directed toward the isolated pathogen.    Penicillin allergy: Patient has a reported penicillin allergy with no reaction documented. The patient has previously received ceftriaxone (5/21/17)  but penicillins have been avoided. Given the patient has an undocumented allergy and penicillins could like be a treatment option in the future, would recommend utilizing the penicillin allergy skin test by placing a PAST consult in the  field.     Recommendations:  1. Discontinue ertapenem, vancomycin, and levofloxacin  2. Start bactrim 1 double strength tablet q12h  3. Consider penicillin allergy skin test to determine if penicillins can safely be used in the future.    Discussed with ID Staff - Dr. Ky Rodriguez MD and Valery Edwards, PharmD, BCPS  Ja HuizarD, MS  PGY-1 Pharmacy Resident    Current Anti-infective Orders:  Anti-infectives (Future)    Start     Dose/Rate Route Frequency Ordered Stop    06/20/17 1100  ertapenem (INVanz) 1 g vial to attach to  mL bag      1 g  over 30 Minutes Intravenous EVERY 24 HOURS 06/19/17 1458      06/20/17 0945  levofloxacin (LEVAQUIN) infusion 500 mg      500 mg  100 mL/hr over 60 Minutes Intravenous EVERY 24 HOURS 06/20/17 0941      06/20/17 0000  vancomycin (VANCOCIN) 1,500 mg in NaCl 0.9 % 250 mL intermittent infusion      1,500 mg  over 90 Minutes Intravenous EVERY 12 HOURS 06/19/17 1458            Vitals and other clinical features  Vitals       06/19 0700  -  06/20 0659 06/20 0700  -  06/21 0659 06/21 0700  -  06/21 0823   Most Recent    Temp ( F) 97.6 -  102.6    96.5 -  99.4      97.2     97.2 (36.2)    Pulse 88 -  123      108      128     128    Heart Rate 74 -  134    60 -  128       110    Resp 15 -  20      18      18     18    BP (!)80/45 -  119/67    93/60 -  108/71      106/65     106/65    SpO2 (%) 95 -  100    93 -  97      95     95        Temperature curve:      Labs  Estimated Creatinine Clearance: 106.8 mL/min (based on Cr of 0.73).  Recent Labs   Lab Test  06/21/17   0623  06/20/17   0709  06/19/17   1612  06/19/17   0608  06/16/17   0609  06/15/17   1753   CR  0.73  0.74  0.71  0.66  0.66  0.69       Recent Labs   Lab Test   06/21/17   0623  06/20/17   0709  06/19/17   1612  06/19/17   0608  06/16/17   0609  06/15/17   1753  06/15/17   0556   06/05/17   0531   05/30/17   0939   WBC  2.8*  2.8*  3.1*  2.5*  2.3*  2.7*  2.3*   < >  3.6*   < >  7.7   ANEU  1.9  1.7   --    --   1.4*   --   1.6   --   3.0   --   6.2   ALYM  0.5*  0.5*   --    --   0.5*   --   0.5*   --   0.6*   --   0.7*   TERRA  0.4  0.4   --    --   0.2   --   0.3   --   0.0   --   0.7   AEOS  0.1  0.2   --    --   0.2   --   0.1   --   0.0   --   0.0   HGB  9.5*  8.7*  9.0*  10.2*  9.2*  9.0*  9.6*   < >  8.6*   < >  9.8*   HCT  28.3*  25.7*  26.4*  30.6*  27.9*  26.5*  29.8*   < >  27.1*   < >  29.1*   MCV  94  94  95  97  96  96  98   < >  105*   < >  104*   PLT  43*  50*  44*  51*  51*  52*  52*   < >  15*   < >  43*    < > = values in this interval not displayed.       Recent Labs   Lab Test  06/19/17   0608  06/16/17   0609  06/15/17   0556  06/05/17   0354  06/01/17   0342  05/31/17   1018   BILITOTAL  0.7  0.8  0.8  1.7*  2.6*  2.4*   ALKPHOS  180*  159*  168*  149  125  129   ALBUMIN  1.8*  1.8*  1.8*  1.9*  1.9*  1.9*   AST  41  34  31  43  597*  765*   ALT  63*  60*  73*  213*  891*  939*       Recent Labs   Lab Test  06/20/17   0709  06/19/17   1612   06/19/17   0608   06/04/17   0408  06/03/17   0307  06/02/17   0342 06/01/17   1007  06/01/17   0342 05/31/17   0357   05/30/17   0347   05/29/17   1055   03/20/16   1738   LACT  1.8  1.8   < >   --    < >   --    --    --    < >  1.6  1.5   < >   --    < >  13.3*   < >  8.3*   < >   --    CRP  110.0*  98.0*   --    --    --   23.0*  29.0*  55.0*   --    --   98.0*   --   170.0*   --   110.0*   --   58.0*   < >  28.4*   SED   --   63*   --    --    --    --    --    --    --    --    --    --    --    --   34*   --   22   --   29   PCAL   --    --    --   0.62   --    --    --    --    --   43.91*   --    --    --    --    --    --    --    --    --     < > = values in this interval not displayed.     No lab  results found.    Invalid input(s): AMIK    Culture results with specimen source  Culture Micro   Date Value Ref Range Status   06/19/2017   Final    50,000 to 100,000 colonies/mL mixed urogenital harshal Susceptibility testing not   routinely done     06/19/2017 (A)  Final    Heavy growth Enterobacter cloacae complex  Culture in progress     06/19/2017 Culture negative monitoring continues  Final   06/19/2017 No growth after 2 days  Final   06/19/2017 No growth after 2 days  Final   06/01/2017 No growth  Final    Specimen Description   Date Value Ref Range Status   06/19/2017 Midstream Urine  Final   06/19/2017 Wound Left UPPER EXTREMITY  Final   06/19/2017 Wound Left UPPER EXTREMITY  Final   06/19/2017 Wound Left UPPER EXTREMITY  Final   06/19/2017 Blood Right Hand  Final   06/19/2017 Blood Right Arm  Final        Urine Studies     Recent Labs   Lab Test  06/19/17   1240  05/29/17   1305  05/21/17   0800  03/20/16   1702  01/14/15   1137   URINEPH  5.5  6.0  5.5  5.5  5.5   NITRITE  Positive*  Negative  Positive*  Negative  Negative   LEUKEST  Negative  Negative  Large*  Negative  Negative   WBCU  2  47*  >182*  <1  1       CSF Testing  No lab results found.    Respiratory Virus Testing    No results found for: RVSPEC, IFLUA, FLUAH1, FLUAH3, SX3385, IFLUB, RSVA, RSVB, PIV1, PIV2, PIV3, HMPV, HRVS, ADVBE, ADVC  Last check of C difficile  C Diff Toxin B PCR   Date Value Ref Range Status   06/05/2017  NEG Final    Negative  Negative: Clostridium difficile target DNA sequences NOT detected, presumed   negative for Clostridium difficile toxin B or the number of bacteria present   may be below the limit of detection for the test.   FDA approved assay performed using jellyfish GeneXpert real-time PCR.   A negative result does not exclude actual disease due to Clostridium difficile   and may be due to improper collection, handling and storage of the specimen or   the number of organisms in the specimen is below the detection  limit of the   assay.         Imaging:  Xr Chest 2 Views    Result Date: 6/19/2017  CHEST TWO VIEWS   6/19/2017 7:53 AM HISTORY: Fever of unknown source, history of sepsis on life alert. COMPARISON: 6/8/2017.     IMPRESSION: Multiple lines and tubes overlying the patient. Small right pleural effusion is unchanged. No left pleural effusion. No pneumothorax. Enteric jejunal feeding tube is unchanged. Hazy opacities are seen throughout the left lung, unchanged. Elevation of the right hemidiaphragm is again noted. MAYNOR LITTLE DO    Mr Elbow Left W/o & W Contrast    Result Date: 6/20/2017  Exam: MRI of the left elbow dated 6/19/2017. COMPARISON: Radiographs dated 6/19/2017. CLINICAL HISTORY: Infected wound with purulence, evaluate for osteomyelitis. TECHNIQUE: Multiplanar, multisequence MR imaging of the left elbow was obtained using standard sequences in 3 orthogonal planes before and after the uneventful administration of intravenous gadolinium contrast. Contrast: 7.5cc of Gadavist injected. FINDINGS: Minimal fluid at the left elbow joint. No marrow signal abnormalities are noted to suggest fracture, osteonecrosis, or marrow infiltration. No marrow signal changes to suggest osteomyelitis. Extensive soft tissue edema is seen within the subcutaneous tissues about the left elbow joint, greatest along the dorsal aspect, extending along the radial and ulnar aspect. No enhancement of the soft tissue edema within the subcutaneous tissues. Multicompartment soft tissue tissue edema is seen diffusely within the musculature about the left elbow joint, greatest within the brachialis, brachioradialis, and pronator teres muscles. Mild diffuse enhancement within the muscles, with a focal area of nonenhancement in the region of the brachialis muscle, as seen on axial series 13 images 11-24. No corresponding fluid in this area of nonenhancement. The triceps tendon is intact. The biceps and brachialis tendons are intact. Mild  soft tissue edema within the ulnar nerve. Partial thickness tearing of the common extensor tendon. The common flexor tendon is intact. Ulnar collateral ligament is intact. The radial collateral ligament and lateral ulnar collateral ligament as well as the annular ligament appear intact.     IMPRESSION: 1. No marrow signal abnormalities within the left elbow to suggest osteomyelitis. 2. Diffuse soft tissue edema within the subcutaneous tissues about the elbow joint, findings most consistent with cellulitis. 3. Diffuse multicompartment soft tissue tissue edema within the muscles about the left elbow, with a focal area of nonenhancement, centered in the brachialis muscle. Differential includes myositis, including infectious or inflammatory etiology. A focal area of myonecrosis is likely within the brachialis muscle. Correlate clinically. 4. Partial-thickness tearing of the common extensor tendon. JULIET MELGOZA MD    Xr Elbow Left 2 Views    Result Date: 6/19/2017  2 views left elbow radiographs 6/19/2017 4:21 PM History: Please do Portable if possible; assess for osteo Comparison: None available. Findings: AP and lateral views of the left elbow were obtained. Incomplete flexion of the left elbow on the lateral projection. No acute osseous abnormality.  No substantial joint effusion. No substantial degenerative change. Small mineralization posterior aspect of elbow joint, may represent intra-articular body. Diffuse subcutaneous soft tissue stranding. Patchy lucency of the visualized ulna and radius, may represent osteopenia.     Impression: 1. No radiographic evidence to suggest osteomyelitis. 2. Diffuse subcutaneous soft tissue stranding, may represent cellulitis in the provided clinical setting. MARVIN SOLITARIO

## 2017-06-21 NOTE — PLAN OF CARE
Problem: Goal Outcome Summary  Goal: Goal Outcome Summary  Afebrile,heart rate 120's,other vitals are within range.Denied chest pain or discomfort.Sepsis alert trigered  with vitals  this morning,lactic acid level checked,result was 3.1,vital signs done per protocol.MD notified of status,500ml normal saline  IV bolus followed with 25% albumin  25gm  [id number] and given.Lactic acid was rechecked at 1445 level  2.7t still high but trending down,level was 2.7,Dr ordered another IV fluid bolus,this would be  given by relieving nurse.Blood glucose stable,correction insulin given per slidng scale.Soft stool x 1 sent to lab  for C-diiff test,result came back negative.Appetite poor,pt on calorie count.Incontinent of urine x 2.New midline access placed on right upper arm.Continue to monitor per plan of care.

## 2017-06-21 NOTE — CONSULTS
[facility] General Infectious Disease Inpatient Consultation      Amelia Michel MRN# [id number]   YOB: 1960 Age: 56 year old   Date of Admission and time: 6/19/2017  2:54 PM     Reason for consult: I was asked by Dr. Chino  to evaluate this patient for LUE wound infection,cellulitis.           Assessment and Plan:   Recommendations:  1. Continue Vancomycin and Ertapenem.  2. Agree with d/c Levaquin.  3. Would consider narrowing antibiotics following clinical improvement of the LUE wound.    Assessment:  # Left Upper extremity wound and soft tissue infection- cellulitis    Amelia Michel is a 56 year old woman with hx of VSD repair (1969), Rheumatoid arthritis, and a recent diagnosis of Afib/Aflutter/SVT, who presented to [facility] on 5/29 after Out of Hospital cardiac arrest.  After ROSC in the field, she was admitted from 5/29-6/15, extubated 6/4. While at ARU (6/15-6/19), pt developed worsening LUE wound pain and fever, and was admitted back to [facility] on 6/19/17 for further workup.     The patient first developed the LUE wound in her previous admission on 5/29. It is unclear as to what incited the wound but is thought to be secondary to IV extravasation. On chart review, it seems that she initially developed blisters and sloughed off epidermis around multiple old IV site bruises and edema up to the shoulder. 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 No DVTs. Vascular surgery consult at that time showed no indication for surgical intervention.  She received Vancomycin (5/29-5/31), meropenem (5/29-6/3) and ertapenem(6/3-6/4) during that hospitalization for Aspiration PNA and ESBL UTI.    Following her discharge, the patient experienced worsening of the wound pain and had fevers which prompted admission. Wound culture on 6/19/17 grew Enterobacter cloacae  complex, enterococcus faecalis and coagulase negative staphylococcus. On examination, the wound does show signs of inflammation c/w cellulitis though unclear if it is chemical or infectious.  The wound has been receding as per the patient since this admission, suggesting that either more eefective wound cares or antibiotics or both may be improving the wound.  As of now, would continue Vancomycin and ertapenem and would consider narrowing and deescalating following further clinical improvement.        Thank you very much Dr. Chino for your kind consultation and for involving me in the care of Mrs. Amelia Michel. I will keep in touch with you, and please do not hesitate to call me for any question.     Monserrat Haddad   Pager: [phone]  06/21/2017     Scribe Disclosure:   I, Monserrat Haddad, am serving as a scribe; to document services personally performed by MEME Chow- -based on data collection and the provider's statements to me.     Provider Disclosure:  I agree with above History, Review of Systems, Physical exam and Plan.  I have reviewed the content of the documentation and have edited it as needed. I have personally performed the services documented here and the documentation accurately represents those services and the decisions I have made.      MEME Chow M.D.  [facility] Service Staff  [phone]             History of Present Illness:   This patient is a 56 year old female with hx of VSD repair (1969), Rheumatoid arthritis, and a recent diagnosis of Afib/Aflutter/SVT, who presented to [facility] on 5/29 after Out of Hospital cardiac arrest with shockable rhythm. After ROSC in the field, she was admitted from 5/29-6/15, extubated 6/4. While at ARU (6/15-6/19), pt developed worsening LUE wound pain and fever, and was admitted back to [facility] for further workup.     Patient first developed a fever up to 101 on 6/18/17 which was associated with chills and diaphoresis. She states that her left upper  extremity wound first occurred around [facility] day, around the time of the cardiac arrest, and felt that it got worse when fluid extravasated into the wound from her IV. Currently, the wound is red, hot to touch with swelling extending up to the shoulder. While at ARU, pt was noticing worsening pain in LUE while performing therapeutic exercises . She also complains of weakness in movement of left index finger and thumb and wrist pain. Denies any chest pain, SOB, nausea/vomiting or abdominal pain. Denies any other wounds or sores. She is quite diaphoretic during our interview. Blood, wound and urine cultures were obtained on admission and patient was started on Vancomycin and Ertapenem. Levaquin was added on 6/20. She is PCN allergic (she reports tongue swelling when she was 6 years old).                Past Medical History:     Past Medical History:   Diagnosis Date     Hypertension      Rheumatoid arthritis(714.0)              Past Surgical History:     Past Surgical History:   Procedure Laterality Date     ANESTHESIA CARDIOVERSION N/A 5/17/2017    Procedure: ANESTHESIA CARDIOVERSION;  ANESTHESIA CARDIOVERSION;  Surgeon: GENERIC ANESTHESIA PROVIDER;  Location: [facility] OR     ARTHRODESIS WRIST  2000    Right wrist     FOOT SURGERY      4 left and 2 right     RELEASE CARPAL TUNNEL BILATERAL       REPAIR VENTRICULAR SEPTAL DEFECT  1969               Social History:     Social History   Substance Use Topics     Smoking status: Never Smoker     Smokeless tobacco: Never Used     Alcohol use Yes      Comment: Glass of wine two evenings a night             Family History:   I have reviewed this patient's family history  Family History   Problem Relation Age of Onset     Breast Cancer Mother      Hypertension Mother      Alzheimer Disease Mother      Hypertension Father      Blood Disease Father      Lymphoa     Circulatory Father      A Fib     DIABETES Paternal Grandmother             Immunizations:     Immunization History    Administered Date(s) Administered     Pneumococcal 23 valent 08/31/2011     TDAP Vaccine (Adacel) 02/08/2011             Allergies:     Allergies   Allergen Reactions     Penicillins      Tape [Adhesive Tape] Rash             Medications:       pantoprazole  40 mg Oral QAM     [START ON 6/22/2017] vancomycin (VANCOCIN) in 250 mL intermittent infusion (for dose 501-1500 mg)  1,250 mg Intravenous Q12H     sodium chloride (PF)  10 mL Intracatheter Q8H     heparin lock flush  5-10 mL Intracatheter Q24H     digoxin  125 mcg Oral Daily     sodium chloride 0.9%  1,000 mL Intravenous Once     apixaban ANTICOAGULANT  2.5 mg Oral BID     ascorbic acid  500 mg Oral Daily     atenolol  50 mg Oral Daily     beta carotene  25,000 Units Oral Daily     cetirizine  10 mg Oral Daily     ertapenem  1 g Intravenous Q24H     folic acid  1 mg Oral Daily     gabapentin  200 mg Oral TID     morphine 0.1% in solosite  2 g Transdermal TID     multivitamin, therapeutic with minerals  1 tablet Oral Daily     triamcinolone   Topical BID     zinc sulfate  220 mg Oral Daily     sodium chloride (PF)  3 mL Intracatheter Q8H     insulin aspart  1-7 Units Subcutaneous TID AC     insulin aspart  1-5 Units Subcutaneous At Bedtime     calcium-vitamin D  2 tablet Oral Daily     co-enzyme Q-10  30 mg Oral Daily     melatonin  1 mg Oral At Bedtime     predniSONE  10 mg Oral Daily       Infusions/Drips:    IV fluid REPLACEMENT ONLY       - MEDICATION INSTRUCTIONS -       - MEDICATION INSTRUCTIONS -                Review of Systems:   ROS:  All 12 systems reviewed.  Relevant positives/negatives noted in HPI above                 Physical Exam:   Temp: 95.8  F (35.4  C) Temp src: Axillary BP: 96/52 Pulse: 122   Resp: 18 SpO2: 95 % O2 Device: None (Room air)      Wt Readings from Last 4 Encounters:   06/21/17 80.4 kg (177 lb 3.2 oz)   06/19/17 75.7 kg (166 lb 12.8 oz)   06/16/17 72.8 kg (160 lb 8 oz)   05/23/17 64.4 kg (141 lb 15.6 oz)       Constitutional:  awake, alert, cooperative, no apparent distress and appears at stated age, well nourished.   Head, ENT, Eyes, and Neck: Normocephalic, sinuses non-tender to palpation, external ears without lesions, moist buccal mucosa without oral thrush   PERRL, EOMI, pink conjunctivae, non-icteric sclera.   Neck supple without rigidity, no cervical/axillary/inguinal LA bilaterally.   Lungs: CTA bilaterally, no accessory muscle use, no dullness to percussion and no abnormal tactile fremitus.   CVS : irregularly irregular rhythm, variable S1/S2, no murmur, PMI was not displaced.   Abdomen: non-tender, non-distended, no masses, no bruit, no shifting dullness, normal BS.   Extremities : LUE wound with overlying eschar and fibrinous material, with surrounding warmth, edema and erythema, no active drainage, wrist tenderness, +1 pitting edema of bilateral lower extremities, normal ROM of all joints           Data:   Inflammatory Markers    Recent Labs   Lab Test  06/20/17   0709  06/19/17   1612  06/04/17   0408  06/03/17   0307  06/02/17   0342  06/01/17   0342  05/31/17   0357  05/30/17   0347  05/29/17   1055   03/20/16   1738   SED   --   63*   --    --    --    --    --   34*  22   --   29   CRP  110.0*  98.0*  23.0*  29.0*  55.0*  98.0*  170.0*  110.0*  58.0*   < >  28.4*    < > = values in this interval not displayed.       Metabolic Studies       Recent Labs   Lab Test  06/21/17   1443  06/21/17   0851  06/21/17   0623  06/20/17   0709  06/19/17   1612   06/19/17   0608  06/16/17   0609  06/15/17   1753   06/09/17   0449   06/07/17   0422  06/06/17   1654   05/31/17   1018   NA   --    --   133  136  136   --   135  136  134   < >  142   < >  148*  146*   < >  148*   POTASSIUM   --    --   4.6  4.6  4.7   --   4.6  4.0  4.4   < >  4.1   < >  3.6  4.1   < >  4.3   CHLORIDE   --    --   105  105  105   --   104  103  102   < >  107   < >  108  106   < >  108   CO2   --    --   19*  24  24   --   21  28  26   < >  33*   < >  36*   36*   < >  32   ANIONGAP   --    --   9  7  7   --   10  5  7   < >  2*   < >  5  4   < >  8   BUN   --    --   39*  32*  32*   --   31*  27  30   < >  31*   < >  37*  40*   < >  46*   CR   --    --   0.73  0.74  0.71   --   0.66  0.66  0.69   < >  0.74   < >  0.75  0.78   < >  1.85*   GFRESTIMATED   --    --   83  81  85   --   >90  Non  GFR Calc    >90  Non  GFR Calc    87   < >  81   < >  80  77   < >  28*   GLC   --    --   179*  79  149*   --   159*  141*  190*   < >  112*   < >  135*  144*   < >  138*   A1C   --    --    --    --    --    --    --    --    --    --    --    --    --    --    --   Canceled, Test credited   UNABLE TO CALCULATE % HBA1C DUE TO LOW HGB AND/OR LOW HGBA1C  NOTIFIED CHELSEA BEE RN AT 1253 ON 5/31/17 [facility]     VIKTORIYA   --    --   7.4*  7.5*  7.5*   --   7.8*  7.4*  7.3*   < >  7.5*   < >  7.4*  7.4*   < >  7.6*   PHOS   --    --    --    --    --    --    --    --    --    --    --    --   3.3  1.7*   < >   --    MAG   --    --   2.0  1.7  1.8   --    --   1.7   --    < >   --    --   1.9   --    < >  2.3   LACT  2.7*  3.1*   --   1.8  1.8   < >   --    --    --    < >   --    < >   --    --    < >   --    CKT   --    --    --    --    --    --    --    --    --    --   23*   --    --    --    --    --     < > = values in this interval not displayed.       Hepatic Studies    Recent Labs   Lab Test  06/19/17   0608  06/16/17   0609  06/15/17   0556  06/12/17   0413  06/11/17   0400   06/05/17   0354   06/01/17   0342  05/31/17   1018   BILITOTAL  0.7  0.8  0.8   --    --    --   1.7*   --   2.6*  2.4*   ALKPHOS  180*  159*  168*   --    --    --   149   --   125  129   ALBUMIN  1.8*  1.8*  1.8*   --    --    --   1.9*   --   1.9*  1.9*   AST  41  34  31   --    --    --   43   --   597*  765*   ALT  63*  60*  73*   --    --    --   213*   --   891*  939*   LDH   --    --    --   203  219   < >  353*   < >  583*   --     < > = values in this interval not  displayed.       Pancreatitis testing    Recent Labs   Lab Test  05/30/17   0939  04/29/16   0749 03/15/16  02/04/11   0909 02/04/11   LIPASE  88   --    --    --    --    TRIG   --   251*  286  80  80       Hematology Studies      Recent Labs   Lab Test  06/21/17   0623  06/20/17   0709  06/19/17   1612  06/19/17   0608  06/16/17   0609  06/15/17   1753  06/15/17   0556   06/05/17   0531   05/30/17   0939   WBC  2.8*  2.8*  3.1*  2.5*  2.3*  2.7*  2.3*   < >  3.6*   < >  7.7   ANEU  1.9  1.7   --    --   1.4*   --   1.6   --   3.0   --   6.2   ALYM  0.5*  0.5*   --    --   0.5*   --   0.5*   --   0.6*   --   0.7*   TERRA  0.4  0.4   --    --   0.2   --   0.3   --   0.0   --   0.7   AEOS  0.1  0.2   --    --   0.2   --   0.1   --   0.0   --   0.0   HGB  9.5*  8.7*  9.0*  10.2*  9.2*  9.0*  9.6*   < >  8.6*   < >  9.8*   HCT  28.3*  25.7*  26.4*  30.6*  27.9*  26.5*  29.8*   < >  27.1*   < >  29.1*   PLT  43*  50*  44*  51*  51*  52*  52*   < >  15*   < >  43*    < > = values in this interval not displayed.       Clotting Studies    Recent Labs   Lab Test  06/21/17 0623 06/20/17   0709  06/19/17   1612  06/16/17   0609   05/30/17   1545  05/30/17   0939  05/30/17   0347   INR  1.42*  1.29*  1.44*  1.36*   < >  4.38*  4.45*  3.92*   PTT   --    --   31   --    --   42*  44*  40*    < > = values in this interval not displayed.       Arterial Blood Gas Testing    Recent Labs   Lab Test  06/04/17   0913  06/04/17   0408  06/03/17   1558  06/03/17   0905  06/02/17 2008   PH  7.44  7.45  7.40  7.35  7.45   PCO2  45  45  50*  53*  44   PO2  94  90  93  85  104   HCO3  30*  31*  31*  29*  31*   O2PER  40  40  40.0  40.0  40%        Urine Studies     Recent Labs   Lab Test  06/19/17   1240  05/29/17   1305  05/21/17   0800  03/20/16   1702  01/14/15   1137   URINEPH  5.5  6.0  5.5  5.5  5.5   NITRITE  Positive*  Negative  Positive*  Negative  Negative   LEUKEST  Negative  Negative  Large*  Negative  Negative   WBCU  2   47*  >182*  <1  1       Microbiology:  6/19/17 Blood cx: NGTD  6/19/17 Wound Left Upper extremity cx:  Heavy growth Enterobacter cloacae complex         Moderate growth Enterococcus faecalis         Moderate growth Coagulase negative Staphylococcus   Culture & Susceptibility      HEAVY GROWTH ENTEROBACTER CLOACAE COMPLEX (MARIA R)      Antibiotic Sensitivity Unit Status     AMIKACIN <=2 Susceptible ug/mL Final     AMPICILLIN >=32 Resistant ug/mL Final     AMPICILLIN/SULBACTAM >=32 Resistant ug/mL Final     CEFEPIME <=1 Susceptible ug/mL Final     CEFTAZIDIME >=64 Resistant ug/mL Final     CEFTRIAXONE >=64 Resistant ug/mL Final     CIPROFLOXACIN <=0.25 Susceptible ug/mL Final     GENTAMICIN <=1 Susceptible ug/mL Final     LEVOFLOXACIN <=0.12 Susceptible ug/mL Final     MEROPENEM <=0.25 Susceptible ug/mL Final     Piperacillin/Tazo >=128 Resistant ug/mL Final     TOBRAMYCIN <=1 Susceptible ug/mL Final     Trimethoprim/Sulfa <=1/19 Susceptible ug/mL Final          6/19/17 Urine cx: 50,000 to 100,000 colonies/mL mixed urogenital harshal       Last check of C difficile  C Diff Toxin B PCR   Date Value Ref Range Status   06/21/2017  NEG Final    Negative  Negative: Clostridium difficile target DNA sequences NOT detected, presumed   negative for Clostridium difficile toxin B or the number of bacteria present   may be below the limit of detection for the test.   FDA approved assay performed using [employer] GeneXpert real-time PCR.   A negative result does not exclude actual disease due to Clostridium difficile   and may be due to improper collection, handling and storage of the specimen or   the number of organisms in the specimen is below the detection limit of the   assay.           Imaging:  MRI of the left elbow dated 6/19/2017.  IMPRESSION:  1. No marrow signal abnormalities within the left elbow to suggest  osteomyelitis.  2. Diffuse soft tissue edema within the subcutaneous tissues about the  elbow joint, findings most  consistent with cellulitis.  3. Diffuse multicompartment soft tissue tissue edema within the  muscles about the left elbow, with a focal area of nonenhancement,  centered in the brachialis muscle. Differential includes myositis,  including infectious or inflammatory etiology. A focal area of  myonecrosis is likely within the brachialis muscle. Correlate  clinically.  4. Partial-thickness tearing of the common extensor tendon    XR LEFT ELBOW 6/19/17  Impression:   1. No radiographic evidence to suggest osteomyelitis.  2. Diffuse subcutaneous soft tissue stranding, may represent  cellulitis in the provided clinical setting.    CXR 6/19/17  IMPRESSION: Multiple lines and tubes overlying the patient. Small  right pleural effusion is unchanged. No left pleural effusion. No  pneumothorax. Enteric jejunal feeding tube is unchanged. Hazy  opacities are seen throughout the left lung, unchanged. Elevation of  the right hemidiaphragm is again noted

## 2017-06-21 NOTE — PLAN OF CARE
Problem: Goal Outcome Summary  Goal: Goal Outcome Summary  PT - per plan established by the Physical Therapist, according to functional mobility the  discharge recommendation is return to ARU when med stable. Pt not feeling well today not wanting PT. Pt ED on supine there x and assist pt for L arm/hand positions to prevent swelling.

## 2017-06-21 NOTE — PLAN OF CARE
"Problem: Goal Outcome Summary  Goal: Goal Outcome Summary  /53 (BP Location: Right arm)  Pulse 108  Temp 99.4  F (37.4  C) (Oral)  Resp 18  Ht 1.48 m (4' 10.25\")  Wt 78.6 kg (173 lb 3.2 oz)  SpO2 93%  BMI 35.89 kg/m2     VSS on RA. A/Ox4. C/o pain to L hand/arm, managed with PRN Tramadol Q6h. On cont tele monitoring, has life vest on; pt reports she will be getting a new vest today. L upper extremity wound cover with foam dressing, morphine gel applied x1 for pain. ABX vanco given x1 per orders Q12 hr. NG tube in place and infusing TF at 40mL/hr, TF will be d/c at 10:30 d/t starting late, will pass onto day nurse. Incont urine x1. No BM this shift. Lymph wraps in place on bilat LE. DD3 diet and tolerating well. TF stopped at 0650 d/t pt dry heaving and nausea occurring. MD notified and said order would be put in for antemetic.  Will cont to monitor and follow POC.       "

## 2017-06-21 NOTE — PLAN OF CARE
Problem: Goal Outcome Summary  Goal: Goal Outcome Summary  Outcome: No Change  Pt VSS; a.flutter with HR in the 80s; RA with sats in the upper 90s. HR can get up to 120-130s with activity. Afebrile. Pain at the LUE addressed with scheduled topical morphine and Tylenol PRN with some relief. Life Vest on; spare battery at bedside. Cultures sent for wound; wound nurse saw pt and orders placed for care plan. IV abx given; R arm increasingly edematous. MD notified and order for midline placed. NG patent; cyclic TF to resume at 1800. Incontinent of stool and urine x1; otherwise able to call appropriately to use commode. Continues on enteric precautions. Lymph consulted for LE edema; lymph wraps on. Orders to transfer pt to 5B; report given to RN. Pt, belongings, chart, and meds brought down at 1545. Pharmacy tech notified to transfer down morphine to 5B Pyxis. Continue to monitor and notify team with changes.

## 2017-06-21 NOTE — PLAN OF CARE
Problem: Goal Outcome Summary  Goal: Goal Outcome Summary  OT/5B: Pt declining therapy, requesting to rest. Will reschedule

## 2017-06-22 ENCOUNTER — APPOINTMENT (OUTPATIENT)
Dept: SPEECH THERAPY | Facility: CLINIC | Age: 57
DRG: 871 | End: 2017-06-22
Attending: STUDENT IN AN ORGANIZED HEALTH CARE EDUCATION/TRAINING PROGRAM
Payer: COMMERCIAL

## 2017-06-22 ENCOUNTER — APPOINTMENT (OUTPATIENT)
Dept: OCCUPATIONAL THERAPY | Facility: CLINIC | Age: 57
DRG: 871 | End: 2017-06-22
Attending: INTERNAL MEDICINE
Payer: COMMERCIAL

## 2017-06-22 LAB
ANION GAP SERPL CALCULATED.3IONS-SCNC: 8 MMOL/L (ref 3–14)
BASOPHILS # BLD AUTO: 0 10E9/L (ref 0–0.2)
BASOPHILS NFR BLD AUTO: 0.8 %
BUN SERPL-MCNC: 34 MG/DL (ref 7–30)
CALCIUM SERPL-MCNC: 7.2 MG/DL (ref 8.5–10.1)
CHLORIDE SERPL-SCNC: 107 MMOL/L (ref 94–109)
CO2 SERPL-SCNC: 20 MMOL/L (ref 20–32)
CREAT SERPL-MCNC: 0.62 MG/DL (ref 0.52–1.04)
DIFFERENTIAL METHOD BLD: ABNORMAL
EOSINOPHIL # BLD AUTO: 0.1 10E9/L (ref 0–0.7)
EOSINOPHIL NFR BLD AUTO: 2.8 %
ERYTHROCYTE [DISTWIDTH] IN BLOOD BY AUTOMATED COUNT: 21.2 % (ref 10–15)
GFR SERPL CREATININE-BSD FRML MDRD: ABNORMAL ML/MIN/1.7M2
GLUCOSE BLDC GLUCOMTR-MCNC: 136 MG/DL (ref 70–99)
GLUCOSE BLDC GLUCOMTR-MCNC: 138 MG/DL (ref 70–99)
GLUCOSE BLDC GLUCOMTR-MCNC: 147 MG/DL (ref 70–99)
GLUCOSE BLDC GLUCOMTR-MCNC: 147 MG/DL (ref 70–99)
GLUCOSE BLDC GLUCOMTR-MCNC: 179 MG/DL (ref 70–99)
GLUCOSE SERPL-MCNC: 152 MG/DL (ref 70–99)
HCT VFR BLD AUTO: 23.7 % (ref 35–47)
HGB BLD-MCNC: 7.9 G/DL (ref 11.7–15.7)
IMM GRANULOCYTES # BLD: 0 10E9/L (ref 0–0.4)
IMM GRANULOCYTES NFR BLD: 0.4 %
INR PPP: 1.33 (ref 0.86–1.14)
LACTATE BLD-SCNC: 1.6 MMOL/L (ref 0.7–2.1)
LYMPHOCYTES # BLD AUTO: 0.5 10E9/L (ref 0.8–5.3)
LYMPHOCYTES NFR BLD AUTO: 19.5 %
MAGNESIUM SERPL-MCNC: 1.8 MG/DL (ref 1.6–2.3)
MCH RBC QN AUTO: 31.7 PG (ref 26.5–33)
MCHC RBC AUTO-ENTMCNC: 33.3 G/DL (ref 31.5–36.5)
MCV RBC AUTO: 95 FL (ref 78–100)
MONOCYTES # BLD AUTO: 0.2 10E9/L (ref 0–1.3)
MONOCYTES NFR BLD AUTO: 7.7 %
NEUTROPHILS # BLD AUTO: 1.7 10E9/L (ref 1.6–8.3)
NEUTROPHILS NFR BLD AUTO: 68.8 %
NRBC # BLD AUTO: 0 10*3/UL
NRBC BLD AUTO-RTO: 0 /100
PLATELET # BLD AUTO: 33 10E9/L (ref 150–450)
POTASSIUM SERPL-SCNC: 4 MMOL/L (ref 3.4–5.3)
RBC # BLD AUTO: 2.49 10E12/L (ref 3.8–5.2)
SODIUM SERPL-SCNC: 135 MMOL/L (ref 133–144)
VANCOMYCIN SERPL-MCNC: 23.3 MG/L
WBC # BLD AUTO: 2.5 10E9/L (ref 4–11)

## 2017-06-22 PROCEDURE — 36592 COLLECT BLOOD FROM PICC: CPT | Performed by: INTERNAL MEDICINE

## 2017-06-22 PROCEDURE — 83735 ASSAY OF MAGNESIUM: CPT | Performed by: INTERNAL MEDICINE

## 2017-06-22 PROCEDURE — 25000132 ZZH RX MED GY IP 250 OP 250 PS 637: Performed by: STUDENT IN AN ORGANIZED HEALTH CARE EDUCATION/TRAINING PROGRAM

## 2017-06-22 PROCEDURE — 25000125 ZZHC RX 250: Performed by: INTERNAL MEDICINE

## 2017-06-22 PROCEDURE — 40000558 ZZH STATISTIC CVC DRESSING CHANGE

## 2017-06-22 PROCEDURE — 25000132 ZZH RX MED GY IP 250 OP 250 PS 637: Performed by: INTERNAL MEDICINE

## 2017-06-22 PROCEDURE — 00000146 ZZHCL STATISTIC GLUCOSE BY METER IP

## 2017-06-22 PROCEDURE — 40000133 ZZH STATISTIC OT WARD VISIT: Performed by: OCCUPATIONAL THERAPIST

## 2017-06-22 PROCEDURE — 25000128 H RX IP 250 OP 636: Performed by: INTERNAL MEDICINE

## 2017-06-22 PROCEDURE — 99207 ZZC CDG-MDM COMPONENT: MEETS MODERATE - DOWN CODED: CPT | Performed by: INTERNAL MEDICINE

## 2017-06-22 PROCEDURE — 40000225 ZZH STATISTIC SLP WARD VISIT: Performed by: SPEECH-LANGUAGE PATHOLOGIST

## 2017-06-22 PROCEDURE — 80048 BASIC METABOLIC PNL TOTAL CA: CPT | Performed by: INTERNAL MEDICINE

## 2017-06-22 PROCEDURE — 99232 SBSQ HOSP IP/OBS MODERATE 35: CPT | Performed by: INTERNAL MEDICINE

## 2017-06-22 PROCEDURE — 25000132 ZZH RX MED GY IP 250 OP 250 PS 637

## 2017-06-22 PROCEDURE — 27210436 ZZH NUTRITION PRODUCT SEMIELEM INTERMED CAN

## 2017-06-22 PROCEDURE — 99211 OFF/OP EST MAY X REQ PHY/QHP: CPT | Mod: 25

## 2017-06-22 PROCEDURE — 12000008 ZZH R&B INTERMEDIATE UMMC

## 2017-06-22 PROCEDURE — 25000128 H RX IP 250 OP 636: Performed by: STUDENT IN AN ORGANIZED HEALTH CARE EDUCATION/TRAINING PROGRAM

## 2017-06-22 PROCEDURE — 85610 PROTHROMBIN TIME: CPT | Performed by: INTERNAL MEDICINE

## 2017-06-22 PROCEDURE — 83605 ASSAY OF LACTIC ACID: CPT | Performed by: INTERNAL MEDICINE

## 2017-06-22 PROCEDURE — 92610 EVALUATE SWALLOWING FUNCTION: CPT | Mod: GN | Performed by: SPEECH-LANGUAGE PATHOLOGIST

## 2017-06-22 PROCEDURE — 97140 MANUAL THERAPY 1/> REGIONS: CPT | Mod: GO | Performed by: OCCUPATIONAL THERAPIST

## 2017-06-22 PROCEDURE — 85025 COMPLETE CBC W/AUTO DIFF WBC: CPT | Performed by: INTERNAL MEDICINE

## 2017-06-22 RX ADMIN — POTASSIUM CHLORIDE 10 MEQ: 7.46 INJECTION, SOLUTION INTRAVENOUS at 21:52

## 2017-06-22 RX ADMIN — PREDNISONE 10 MG: 10 TABLET ORAL at 09:27

## 2017-06-22 RX ADMIN — Medication 25000 UNITS: at 09:26

## 2017-06-22 RX ADMIN — ATENOLOL 50 MG: 50 TABLET ORAL at 09:26

## 2017-06-22 RX ADMIN — ACETAMINOPHEN 650 MG: 325 TABLET, FILM COATED ORAL at 13:11

## 2017-06-22 RX ADMIN — SODIUM CHLORIDE, PRESERVATIVE FREE 5 ML: 5 INJECTION INTRAVENOUS at 15:35

## 2017-06-22 RX ADMIN — GABAPENTIN 200 MG: 100 CAPSULE ORAL at 20:27

## 2017-06-22 RX ADMIN — Medication 2 G: at 09:49

## 2017-06-22 RX ADMIN — VANCOMYCIN HYDROCHLORIDE 1250 MG: 10 INJECTION, POWDER, LYOPHILIZED, FOR SOLUTION INTRAVENOUS at 00:21

## 2017-06-22 RX ADMIN — Medication 30 MG: at 09:27

## 2017-06-22 RX ADMIN — TRIAMCINOLONE ACETONIDE: 1 OINTMENT TOPICAL at 09:33

## 2017-06-22 RX ADMIN — VANCOMYCIN HYDROCHLORIDE 1250 MG: 10 INJECTION, POWDER, LYOPHILIZED, FOR SOLUTION INTRAVENOUS at 12:13

## 2017-06-22 RX ADMIN — GABAPENTIN 200 MG: 100 CAPSULE ORAL at 09:25

## 2017-06-22 RX ADMIN — ERTAPENEM SODIUM 1 G: 1 INJECTION, POWDER, LYOPHILIZED, FOR SOLUTION INTRAMUSCULAR; INTRAVENOUS at 11:10

## 2017-06-22 RX ADMIN — INSULIN ASPART 1 UNITS: 100 INJECTION, SOLUTION INTRAVENOUS; SUBCUTANEOUS at 13:06

## 2017-06-22 RX ADMIN — Medication 220 MG: at 09:26

## 2017-06-22 RX ADMIN — POTASSIUM CHLORIDE 10 MEQ: 7.46 INJECTION, SOLUTION INTRAVENOUS at 23:12

## 2017-06-22 RX ADMIN — CALCIUM CARBONATE 600 MG (1,500 MG)-VITAMIN D3 400 UNIT TABLET 2 TABLET: at 09:28

## 2017-06-22 RX ADMIN — OXYCODONE HYDROCHLORIDE AND ACETAMINOPHEN 500 MG: 500 TABLET ORAL at 09:26

## 2017-06-22 RX ADMIN — PANTOPRAZOLE SODIUM 40 MG: 40 TABLET, DELAYED RELEASE ORAL at 09:26

## 2017-06-22 RX ADMIN — Medication 2 G: at 17:48

## 2017-06-22 RX ADMIN — TRAMADOL HYDROCHLORIDE 25 MG: 50 TABLET, FILM COATED ORAL at 20:26

## 2017-06-22 RX ADMIN — ONDANSETRON HYDROCHLORIDE 4 MG: 4 TABLET, FILM COATED ORAL at 03:14

## 2017-06-22 RX ADMIN — Medication 2 G: at 20:29

## 2017-06-22 RX ADMIN — APIXABAN 2.5 MG: 2.5 TABLET, FILM COATED ORAL at 20:26

## 2017-06-22 RX ADMIN — DIGOXIN 125 MCG: 125 TABLET ORAL at 07:50

## 2017-06-22 RX ADMIN — CETIRIZINE HYDROCHLORIDE 10 MG: 10 TABLET, FILM COATED ORAL at 09:27

## 2017-06-22 RX ADMIN — APIXABAN 2.5 MG: 2.5 TABLET, FILM COATED ORAL at 09:27

## 2017-06-22 RX ADMIN — TRIAMCINOLONE ACETONIDE: 1 OINTMENT TOPICAL at 20:29

## 2017-06-22 RX ADMIN — MULTIPLE VITAMINS W/ MINERALS TAB 1 TABLET: TAB at 09:25

## 2017-06-22 RX ADMIN — GABAPENTIN 200 MG: 100 CAPSULE ORAL at 13:11

## 2017-06-22 RX ADMIN — FOLIC ACID 1 MG: 1 TABLET ORAL at 09:27

## 2017-06-22 NOTE — PROGRESS NOTES
Calorie Counts  Intake recorded for: 6/21 Kcals: 628  Protein: 44g  # Meals Recorded: 100% cheerios with 2 1% milks, applesauce, cottage cheese, iced tea  # Supplements Recorded: 100% Ensure Clear and 1 packet Bene Protein

## 2017-06-22 NOTE — PLAN OF CARE
Problem: Goal Outcome Summary  Goal: Goal Outcome Summary  SLP: Patient seen at bedside for swallow evaluation. No s/s of aspiration across textures. Pt demonstrating use of safe swallow strategies. Recommend regular diet and thin liquids. Strategies include alternating solids and liquids, small bites and sips, single sips. SLP to follow up for diet tolerance and for language evaluation.

## 2017-06-22 NOTE — PROGRESS NOTES
Social Work Services Progress Note    Hospital Day: 4  Date of Initial Social Work Evaluation:  6/20/17  Collaborated with:  Pt, pt's     Data:  Pt is a 56-year-old woman admitted from ARU after developing worsening LUE wound pain and fever. Pt expected to be ready to discharge in 2 days.    Intervention:  Pt's  asked to speak with SW. Pt's  needs pt's work disability forms and his own FMLA paperwork completed by physician. Will hand off this paperwork to primary team physician.    Pt states that she plans to return to ARU at discharge. PM&R evaluated pt and recommended ARU. [facility] following.     Assessment:  Pt to return to ARU at discharge    Plan:    Anticipated Disposition:  Facility:  ARU    Barriers to d/c plan:  Pending medical stability, insurance authorization    Follow Up:  FRANCO to follow for discharge planning    ANDIE Adamson, SW  5A Unit   Pager [phone]  Phone [phone]    Addendum 11:58am: FRANCO spoke with ARU admissions liaison Monalisa. Monalisa is submitting for insurance authorization for ARU.

## 2017-06-22 NOTE — PLAN OF CARE
Problem: Goal Outcome Summary  Goal: Goal Outcome Summary  D: Heart rate 110 to 120, other vitals stable.  Up to commode with assist of one and walker.  Incontinent of urine and stool.  Having loose stool. C.diff negative.  Tube feeding started at 1900 at 40cc/hr. Runs for 12 hours.  Telemetry shows atrial flutter. Life vest on at all times.  She is on calorie counts.  Morphine gel applied to left arm wound.   Lactic acid 2.7 at start of shift.  She received IV fluid bolus.  Repeat lactic acid 2.1.   Midline placed on days.  Bleeding at site.  [facility] notified. Appears to have stopped oozing.  P: Continue with plan of care.

## 2017-06-22 NOTE — PROGRESS NOTES
Consult done to assess picc line for bleeding and swelling . Observed dry blood at picc site and dressing changed. Statseal disc applied at picc site.

## 2017-06-22 NOTE — PLAN OF CARE
"Problem: Goal Outcome Summary  Goal: Goal Outcome Summary  Outcome: No Change  /74 (BP Location: Right arm)  Pulse 130  Temp 97.4  F (36.3  C) (Oral)  Resp 18  Ht 1.48 m (4' 10.25\")  Wt 80.4 kg (177 lb 3.2 oz)  SpO2 98%  BMI 36.72 kg/m2 Alert/ ox4. Contact isolation ESBL.  Full code. Pt requested  Prn zofran po once. Turned and repositioned q 2 hours as willing as well as bilateral legs elevated on pillows and then down. Bilateral leg wraps in place. Pt reports she typically has below the knee wraps at home, but here she has had above the knee bilateral wraps. Generalizes edema throughout. TF infusing at 40cc/hr 7am-7pm. Right midline has dried blood from earlier in evening. Pt denies pain in this IV site. RN contacted pharmacist to review Vanco dosing and per pharmacy, Vanco given at scheduled time.   Pt on jesse counts.  Pt Admitted with sepsis from outside hospital. PMH includes recent cardiac arrest in may and dx of atrial fib/atrial flutter. On tele:  Atrial flutter with tachycardia during shift. Pt wearing lifevest with  Battery pack attached and battery charger at bedside.  Pt ahs lweft Arm wound s/e extravasion from cardiac med and wound care orders. Pt had one episode of incontinence of  Soft, formed stool and urine.  Last lactic acid 2.1 (1958).       Plan:  Continue to monitor and follow POC. Notify MD of changes.      "

## 2017-06-22 NOTE — PROGRESS NOTES
[facility], [place]   Hospitalist Daily Progress Note                                                 Date of Admission:2017  ___________________________________  INTERVAL HISTORY (24 Hrs)/SUBJECTIVE:   Last 24 hr events, care team notes reviewed.     Overall feels better except intermittent sob & tachycardia.   Frequent diaper change (uop appears to be fine)  No cp  No fever or chills  No NVD  Pain controlled.     ROS: 4 point ROS neg other than the symptoms noted above in the interval history.    OBJECTIVE :   VITALS:    Temp:  [96.8  F (36  C)-98.2  F (36.8  C)] 97.7  F (36.5  C)  Pulse:  [] 101  Resp:  [18] 18  BP: ()/(47-74) 98/47  SpO2:  [97 %-99 %] 99 %    Temp (24hrs), Av.6  F (36.4  C), Min:96.8  F (36  C), Max:98.2  F (36.8  C)        Wt Readings from Last 5 Encounters:   17 80.4 kg (177 lb 3.2 oz)   17 75.7 kg (166 lb 12.8 oz)   17 72.8 kg (160 lb 8 oz)   17 64.4 kg (141 lb 15.6 oz)   17 61.6 kg (135 lb 12.8 oz)            PHYSICAL EXAM:  General: alert, interactive, NAD, Nasal feeding tube.   HEENT: AT/NC,  Moist MM  Neck: Supple,  Respi/Chest: Non labored. Clear BL, poor effort.    CVS/Heart: S1S2 irregular. Life vest+  bl pedal edema++  Gi/Abd: Soft, non tender, non distended,  MSK/Ext: Distal pulses 2+.     Neuro: AO x 4,      Medications:   I have reviewed this patient's current medications.    Data:   All laboratory and imaging data in the past 24 hours reviewed:    LABS:  CMP    Recent Labs  Lab 17  0653 17  0623 17  0709 17  1612 17  0608 17  0609    133 136 136 135 136   POTASSIUM 4.0 4.6 4.6 4.7 4.6 4.0   CHLORIDE 107 105 105 105 104 103   CO2 20 19* 24 24 21 28   ANIONGAP 8 9 7 7 10 5   * 179* 79 149* 159* 141*   BUN 34* 39* 32* 32* 31* 27   CR 0.62 0.73 0.74 0.71 0.66 0.66   GFRESTIMATED >90Non  GFR Calc 83 81 85 >90Non  GFR Calc  >90Non  GFR Calc   GFRESTBLACK >90African American GFR Calc >90African American GFR Calc >90African American GFR Calc >90African American GFR Calc >90African American GFR Calc >90African American GFR Calc   VIKTORIYA 7.2* 7.4* 7.5* 7.5* 7.8* 7.4*   MAG 1.8 2.0 1.7 1.8  --  1.7   PROTTOTAL  --   --   --   --  5.4* 4.6*   ALBUMIN  --   --   --   --  1.8* 1.8*   BILITOTAL  --   --   --   --  0.7 0.8   ALKPHOS  --   --   --   --  180* 159*   AST  --   --   --   --  41 34   ALT  --   --   --   --  63* 60*     Corrected Ca:~ 9.0 6/22/2017      CBC    Recent Labs  Lab 06/22/17  0653 06/21/17  0623 06/20/17  0709 06/19/17  1612   WBC 2.5* 2.8* 2.8* 3.1*   RBC 2.49* 3.02* 2.73* 2.79*   HGB 7.9* 9.5* 8.7* 9.0*   HCT 23.7* 28.3* 25.7* 26.4*   MCV 95 94 94 95   MCH 31.7 31.5 31.9 32.3   MCHC 33.3 33.6 33.9 34.1   RDW 21.2* 20.9* 21.0* 20.6*   PLT 33* 43* 50* 44*     INR    Recent Labs  Lab 06/22/17  0653 06/21/17  0623 06/20/17  0709 06/19/17  1612   INR 1.33* 1.42* 1.29* 1.44*     Unresulted Labs Ordered in the Past 30 Days of this Admission     Date and Time Order Name Status Description    6/19/2017 0725 Anaerobic bacterial culture Preliminary     6/19/2017 0724 Wound Culture Aerobic Bacterial Preliminary     6/19/2017 0719 Blood culture Preliminary     6/19/2017 0719 Blood culture Preliminary     5/31/2017 1012 s100 B: Laboratory Miscellaneous Order In process     5/29/2017 1046 Adalimumab Activity and Neutralizing Antibodies: Laboratory Miscellaneous Order In process           Echo: 06/05:     Limited, technically difficult study. Extremely difficult acoustic windows  despite the use of contrast for endcardial border definition.  Borderline (EF 50-55%) reduced left ventricular function is present.  Additionally, the abnormal non-specific septal motion and irregular cardiac  cycle contribute to the difficulty in accurately measuring LVEF.  The IVC is dilated at 2.5 cm, c/w increased RAP. The RAP is in excess of  15  mmHg.    ASSESSMENT & PLAN :    Amelia Michel is a 56 year old woman with hx of VSD repair (), Rheumatoid arthritis, and a recent diagnosis of Afib/Aflutter/SVT, who presented to [facility] on  after Out of Hospital cardiac arrest with shockable rhythm. After ROSC in the field, she was admitted from -6/15, extubated . While at ARU (6/15-), pt developed worsening LUE wound pain and fever, and was admitted back to [facility] for further workup. Admitted 2017         Changes Today:    - Lactic acidosis 3.1 : Better with iv hydration. Vitals stable except tachycardia.   - Afib w RVR: did receive digoxin 125 mcg today am. HR rangin-120s. Monitor on tele. Cards: rec to dc digoxin. Done. Monitor.   Atenolol prn for persistent tachy >120  - intermittent sob: monitor. No ivf for now. Encourage oral intake. Increase TF flushes. Consider CXR if persists.   - ID on board. Remains afebrile. Further abx per id. At current on iv Vanco and Ertapenem.   - Dry heaves:: better.               # Sepsis likely from L UE wound infection. On adm: SIRS +  (Fever, leukopenia, tachcyardia)   C.diff neg.   Enteric panel, stool: neg.   UC: mixed urogenital harshal.   WC:   Heavy growth Enterobacter cloacae complex   Moderate growth Enterococcus faecalis   Moderate growth Coagulase negative Staphylococcus   susceptibility reviewed.  BC: no growth so far.     Left UE Wound with concern for cellulitis +/- myositis with potential area of myonecrosis:    L UE wound onset around time of cardiac arrest, unclear etiology (?trauma during code), exacerbated by fluid extravasation from IV during last admission. Over course of rehab, pt has noticed increasing pain in L UE with therapy and spiked fevers to 101F and 102F on  and , respectively. On admission, wound appears erythematous, with eschar and fibrinous material overlying wound (no purulent drainage), tender to palpation.     L Elbow XR:  Negative for  osteo, diffuse subcutaneous soft tissue stranding, possibly representative of cellulitis   6/19 L Elbow MRI: Diffuse cellulitis and multicompartment soft tissue tissue edema within the muscles about the left elbow, with a focal area of nonenhancement,  centered in the brachialis muscle. Differential includes myositis,including infectious or inflammatory etiology. A focal area of myonecrosis is likely within the brachialis muscle. Correlate clinically.    --Started empirically on iv Vancomycin/Ertapenem and added levofloxacin 6/20 for pseudomonal coverage  --General Surgery: 06/19: no surgical indication.   --Pain control: Tramadol 25mg q6h prn, Continue morphine gel around edges of wound for pain   --WOCN on board. Ct wound care.     -- 6/21/2017: ID consulted. Dc iv levaquin.    Further Per ID    Other issues:       # Out of Hospital Cardiac Arrest and Cardiogenic Shock:   - ICD will be deferred at this time in lieu of LUE extravasation/high risk of infection. 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 Plts noted to decrease from 104 to as low as 15 during recent hospitalization requiring transfusion w/ chronically low in the 's as outpatient.  -- If this is chronic ITP, no need for treatment unless plts drift < 30s  -- Continue to hold RA meds for now  -- Continue Apixaban 2.5 mg BID for anticoagulation. This will need to be held 2 days prior to ICD placement after discharge (this will be deferred given acute infection).      # Atrial fibrillation:   Known Afib w/ RVR from prior hospitalization w/ rates difficult to control despite being on Metoprolol 100 mg XL and Diltiazem. She had 1 DCCV for which she reverted within 24hrs. Sotolol was also attempted but  patient did not tolerate the medication due to nausea and vomiting and subsequently failed Metoprolol and Propranolol for the same issue. She was discharged during this previous hospitalization on Amiodarone and was also started on Apixaban. Patient had no evidence of ventriclar arrhythmia prior to discharge. RFA was discuss but was defered as patient had a UTI at the time with ESBL. Given difficulty to control rates, an CMR was performed to r/o inflammation or scar as cause of arrhythmia. CMR did not show atrial inflammation or abnormalities except for enlargement. 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   - Rhythm has been spontaneously converting from NSR to Afib w/ RVR w/ rates into the 130-140's.   - Continue Atenolol 50 mg daily. Plus 25 mg daily prn for HR>120  - Digoxin discontinued 6/20 for trough of 3.4. Digoxin level today 1.8. Resume at lower dose 125 mcg daily. Cardiology text paged, a/w reply. Fu level.       6/22/2017  Digoxin discontinued per cards. Did get 125 mcg today am.     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 Now on 10 mg daily, taper to 7.5 mg on 6/29, then to 5 mg daily after 2 weeks if continues to have low disease activity  -- Continue to hold HCQ and MTX until outpatient follow-up  -- Follow-up with [facility] Dr. Conor Cunha in 4-6 weeks after discharge      # Severe Critical Illness Myopathy:   Significant deconditioning w/ median neuropathies most likely localized to the wrists and ulnar neuropathies most likely localized to the elbows secondary to RA.  -- Ongoing extensive PT/OT/SLP     # ESBL UTI: Completed course abx.      # Malnutrition:   -- Dysphagia Level 3 diet w/ thin liquids. Straws ok. Pt should be upright, alert, small  bites/sips consumed and alternate consistencies.  -- Nutrition recommendations are to continue Peptamen 1.5 Tube feeds to 40 ml/hr x 11 hours which will provide 440 ml TF, 660 kcals, and 30 g protein daily. TFs may be adjusted pending oral intake. Once kcal counts reveal that patient is consuming at least 900 kcals and 45 g protein daily on average can discontinue.  -- Water flushes as needed.  -- Nutrition consult placed      Consulting Services: None (general surgery did not feel surgical intervention required for LUE wound)     CODE: Full  DVT Ppx: Apixaban  Diet/Fluids: Cycled tube feeds, calorie counts,  nutrition following  6/22/2017: per SLP: regular w thin.     - Electrolyte Protocol:  Yes  - Telemetry: Yes; Life Vest needs to be worn at all times    Disposition Plan   Expected discharge in 2-3 days to likely back to ARU once medically stable. fu PMR consult requested as per insurance requirement per CC .     Entered: Johnie Chino 06/22/2017, 3:11 PM        Pt's care was discussed with bedside RN and patient during Care Team Rounds.       Johnie Chino MD   Hospitalist (Internal Medicine)  Pager: [phone].

## 2017-06-22 NOTE — PROGRESS NOTES
06/22/17 1000   General Information   Onset Date 06/19/17   Start of Care Date 06/22/17   Referring Physician Evie Longo MD   Patient Profile Review/OT: Additional Occupational Profile Info See Profile for full history and prior level of function   Patient/Family Goals Statement Pt would like to eat normal meals   Swallowing Evaluation Bedside swallow evaluation   Behaviorial Observations WFL (within functional limits)   Mode of current nutrition Oral diet   Type of oral diet Dysphagia diet level 3;Thin liquid   Respiratory Status Room air   Clinical Swallow Evaluation   Oral Musculature generally intact   Structural Abnormalities none present   Dentition present and adequate   Mucosal Quality good   Mandibular Strength and Mobility intact   Oral Labial Strength and Mobility WFL   Lingual Strength and Mobility WFL   Velar Elevation intact   Buccal Strength and Mobility intact   Laryngeal Function Voicing initiated;Swallow;Cough   Additional Documentation Yes   Additional evaluation(s) completed today No   Swallow Eval   Feeding Assistance no assistance needed   Clinical Swallow Eval: Thin Liquid Texture Trial   Mode of Presentation, Thin Liquids cup;straw;self-fed   Volume of Liquid or Food Presented 2oz   Oral Phase of Swallow WFL   Pharyngeal Phase of Swallow intact   Diagnostic Statement WFL with no s/s of aspiration. Pt self-monitors small, single sips.   Clinical Swallow Eval: Semisolid Texture Trial   Mode of Presentation, Semisolid self-fed   Volume of Semisolid Food Presented 1/4 coco cracker   Oral Phase, Semisolid WFL   Pharyngeal Phase, Semisolid intact   Diagnostic Statement WFL with no s/s of aspiration.    Clinical Swallow Eval: Solid Food Texture Trial   Mode of Presentation, Solid self-fed   Volume of Solid Food Presented 1/4 coco cracker   Oral Phase, Solid WFL   Pharyngeal Phase, Solid intact   Diagnostic Statement WFL. Slightly prolonged mastication time, however no s/s of  aspiration, no residuals. Pt self monitors alternating solids and liquids and small bites.   Swallow Compensations   Swallow Compensations Alternate viscosity of consistencies;Reduce amounts   Results No difficulties noted   Esophageal Phase of Swallow   Patient reports or presents with symptoms of esophageal dysphagia No   General Therapy Interventions   Planned Therapy Interventions Dysphagia Treatment   Dysphagia treatment Instruction of safe swallow strategies;Modified diet education  (Diet tolerance)   Swallow Eval: Clinical Impressions   Skilled Criteria for Therapy Intervention Skilled criteria met.  Treatment indicated.   Functional Assessment Scale (FAS) 6   Treatment Diagnosis Functional oropharyngeal swallow   Diet texture recommendations Regular diet;Thin liquids   Recommended Feeding/Eating Techniques alternate between small bites and sips of food/liquid;small sips/bites   Therapy Frequency 3 times/wk   Predicted Duration of Therapy Intervention (days/wks) 2 weeks   Anticipated Discharge Disposition inpatient rehabilitation facility   Risks and Benefits of Treatment have been explained. Yes   Patient, family and/or staff in agreement with Plan of Care Yes   Clinical Impression Comments Patient seen at bedside for swallow evaluation. No s/s of aspiration across textures. Pt demonstrating use of safe swallow strategies. Recommend regular diet and thin liquids. Strategies include alternating solids and liquids, small bites and sips, single sips. SLP to follow up for diet tolerance and for language evaluation.    Total Evaluation Time   Total Evaluation Time (Minutes) 15

## 2017-06-22 NOTE — PROGRESS NOTES
[facility] General Infectious Disease Inpatient Consultation      Amelia Michel MRN# [id number]   YOB: 1960 Age: 56 year old   Date of Admission and time: 6/19/2017  2:54 PM     Reason for consult: I was asked by Dr. Chino  to evaluate this patient for LUE wound infection,cellulitis.           Assessment and Plan:   Recommendations:  1. Continue Vancomycin and Ertapenem.  2. Would consider transitioning antibiotics following clinical improvement of LUE wound.  3. Would consider penicillin allergy skin test to determine if penicillins can safely be used in the future. Patient is not currently interested; her current isolate is also not amenable to oral beta-lactams regardless.    Assessment:  # Left Upper extremity wound and soft tissue infection- cellulitis    Amelia Michel is a 56 year old woman with hx of VSD repair (1969), Rheumatoid arthritis, and a recent diagnosis of Afib/Aflutter/SVT, who presented to [facility] on 5/29 after Out of Hospital cardiac arrest.  After ROSC in the field, she was admitted from 5/29-6/15, extubated 6/4. While at ARU (6/15-6/19), pt developed worsening LUE wound pain and fever, and was admitted back to [facility] on 6/19/17 for further workup.     The patient first developed the LUE wound in her previous admission on 5/29. It is unclear as to what incited the wound but is thought to be secondary to IV extravasation. On chart review, it seems that she initially developed blisters and sloughed off epidermis around multiple old IV site bruises and edema up to the shoulder. 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 No DVTs. Vascular surgery consult at that time showed no indication for surgical intervention.  She received Vancomycin (5/29-5/31), meropenem (5/29-6/3) and ertapenem(6/3-6/4) during that hospitalization for  Aspiration PNA and ESBL UTI.    Following her discharge, the patient experienced worsening of the wound pain and had fevers which prompted admission. Wound culture on 6/19/17 grew Enterobacter cloacae complex, enterococcus faecalis and coagulase negative staphylococcus. On examination, the wound does show signs of inflammation c/w cellulitis though unclear if it is chemical or infectious.  The wound has been receding as per the patient since this admission, suggesting that either more effective wound cares or antibiotics or both may be improving the wound. In agreement with AMT, Bactrim is reasonable to target the Enterobacter in the wound and would consider transitioning tomorrow.  As of now, would continue broad coverage with Vancomycin and ertapenem. Would also consider penicillin allergy testing if the patient is hemodynamically stable to allow for clear documentation and determine if penicillins can be safely used in the future.     Thank you very much Dr. Chino for your kind consultation and for involving me in the care of Mrs. Amelia Michel. I will keep in touch with you, and please do not hesitate to call me for any question.     Monserrat Haddad   Pager: [phone]  06/22/2017     Scribe Disclosure:   I, Monserrat Haddad, am serving as a scribe; to document services personally performed by MEME Chow- -based on data collection and the provider's statements to me.     Provider Disclosure:  I agree with above History, Review of Systems, Physical exam and Plan.  I have reviewed the content of the documentation and have edited it as needed. I have personally performed the services documented here and the documentation accurately represents those services and the decisions I have made.      MEME Chow M.D.  [facility] Service Staff  [phone]           Interim  History :   No acute events overnight. Wound unchanged. Patient endorses tingling and numbness of her left hand, particularly her thumb and index  finger. No fevers. Overall pain is worse.          History of Present Illness:   This patient is a 56 year old female with hx of VSD repair (1969), Rheumatoid arthritis, and a recent diagnosis of Afib/Aflutter/SVT, who presented to [facility] on 5/29 after Out of Hospital cardiac arrest with shockable rhythm. After ROSC in the field, she was admitted from 5/29-6/15, extubated 6/4. While at ARU (6/15-6/19), pt developed worsening LUE wound pain and fever, and was admitted back to [facility] for further workup.     Patient first developed a fever up to 101 on 6/18/17 which was associated with chills and diaphoresis. She states that her left upper extremity wound first occurred around Memorial day, around the time of the cardiac arrest, and felt that it got worse when fluid extravasated into the wound from her IV. Currently, the wound is red, hot to touch with swelling extending up to the shoulder. While at ARU, pt was noticing worsening pain in LUE while performing therapeutic exercises . She also complains of weakness in movement of left index finger and thumb and wrist pain. Denies any chest pain, SOB, nausea/vomiting or abdominal pain. Denies any other wounds or sores. She is quite diaphoretic during our interview. Blood, wound and urine cultures were obtained on admission and patient was started on Vancomycin and Ertapenem. Levaquin was added on 6/20. She is PCN allergic (she reports tongue swelling when she was 6 years old).                Past Medical History:     Past Medical History:   Diagnosis Date     Hypertension      Rheumatoid arthritis(714.0)              Past Surgical History:     Past Surgical History:   Procedure Laterality Date     ANESTHESIA CARDIOVERSION N/A 5/17/2017    Procedure: ANESTHESIA CARDIOVERSION;  ANESTHESIA CARDIOVERSION;  Surgeon: GENERIC ANESTHESIA PROVIDER;  Location: [facility] OR     ARTHRODESIS WRIST  2000    Right wrist     FOOT SURGERY      4 left and 2 right     RELEASE CARPAL TUNNEL BILATERAL        REPAIR VENTRICULAR SEPTAL DEFECT  1969               Social History:     Social History   Substance Use Topics     Smoking status: Never Smoker     Smokeless tobacco: Never Used     Alcohol use Yes      Comment: Glass of wine two evenings a night             Family History:   I have reviewed this patient's family history  Family History   Problem Relation Age of Onset     Breast Cancer Mother      Hypertension Mother      Alzheimer Disease Mother      Hypertension Father      Blood Disease Father      Lymphoa     Circulatory Father      A Fib     DIABETES Paternal Grandmother             Immunizations:     Immunization History   Administered Date(s) Administered     Pneumococcal 23 valent 08/31/2011     TDAP Vaccine (Adacel) 02/08/2011             Allergies:     Allergies   Allergen Reactions     Penicillins      Tape [Adhesive Tape] Rash             Medications:       pantoprazole  40 mg Oral QAM     vancomycin (VANCOCIN) in 250 mL intermittent infusion (for dose 501-1500 mg)  1,250 mg Intravenous Q12H     sodium chloride (PF)  10 mL Intracatheter Q8H     heparin lock flush  5-10 mL Intracatheter Q24H     apixaban ANTICOAGULANT  2.5 mg Oral BID     ascorbic acid  500 mg Oral Daily     atenolol  50 mg Oral Daily     beta carotene  25,000 Units Oral Daily     cetirizine  10 mg Oral Daily     ertapenem  1 g Intravenous Q24H     folic acid  1 mg Oral Daily     gabapentin  200 mg Oral TID     morphine 0.1% in solosite  2 g Transdermal TID     multivitamin, therapeutic with minerals  1 tablet Oral Daily     triamcinolone   Topical BID     zinc sulfate  220 mg Oral Daily     sodium chloride (PF)  3 mL Intracatheter Q8H     insulin aspart  1-7 Units Subcutaneous TID AC     insulin aspart  1-5 Units Subcutaneous At Bedtime     calcium-vitamin D  2 tablet Oral Daily     co-enzyme Q-10  30 mg Oral Daily     melatonin  1 mg Oral At Bedtime     predniSONE  10 mg Oral Daily       Infusions/Drips:    IV fluid REPLACEMENT ONLY        - MEDICATION INSTRUCTIONS -       - MEDICATION INSTRUCTIONS -                Review of Systems:   ROS:  All 12 systems reviewed.  Relevant positives/negatives noted in HPI above                 Physical Exam:   Temp: 97.5  F (36.4  C) Temp src: Oral BP: 104/56 Pulse: 97   Resp: 18 SpO2: 98 % O2 Device: Nasal cannula Oxygen Delivery: 2 LPM    Wt Readings from Last 4 Encounters:   06/21/17 80.4 kg (177 lb 3.2 oz)   06/19/17 75.7 kg (166 lb 12.8 oz)   06/16/17 72.8 kg (160 lb 8 oz)   05/23/17 64.4 kg (141 lb 15.6 oz)       Constitutional: awake, alert, cooperative, no apparent distress and appears at stated age, well nourished.   Head, ENT, Eyes, and Neck: Normocephalic, sinuses non-tender to palpation, external ears without lesions, moist buccal mucosa without oral thrush   PERRL, EOMI, pink conjunctivae, non-icteric sclera.   Neck supple without rigidity, no cervical/axillary/inguinal LA bilaterally.   Lungs: CTA bilaterally, no accessory muscle use, no dullness to percussion and no abnormal tactile fremitus.   CVS : irregularly irregular rhythm, variable S1/S2, no murmur, PMI was not displaced.   Abdomen: non-tender, non-distended, no masses, no bruit, no shifting dullness, normal BS.   Extremities : LUE wound with overlying eschar and fibrinous material, with surrounding warmth, edema and erythema, no active drainage, wrist tenderness, +1 pitting edema of bilateral lower extremities, normal ROM of all joints           Data:   Inflammatory Markers    Recent Labs   Lab Test  06/20/17   0709  06/19/17   1612  06/04/17   0408  06/03/17   0307  06/02/17   0342  06/01/17   0342  05/31/17   0357  05/30/17   0347  05/29/17   1055   03/20/16   1738   SED   --   63*   --    --    --    --    --   34*  22   --   29   CRP  110.0*  98.0*  23.0*  29.0*  55.0*  98.0*  170.0*  110.0*  58.0*   < >  28.4*    < > = values in this interval not displayed.       Metabolic Studies       Recent Labs   Lab Test  06/22/17   0692   06/21/17   1958  06/21/17   1443   06/21/17   0623  06/20/17   0709  06/19/17   1612   06/19/17   0608  06/16/17   0609   06/09/17   0449   06/07/17   0422  06/06/17   1654   05/31/17   1018   NA  135   --    --    --   133  136  136   --   135  136   < >  142   < >  148*  146*   < >  148*   POTASSIUM  4.0   --    --    --   4.6  4.6  4.7   --   4.6  4.0   < >  4.1   < >  3.6  4.1   < >  4.3   CHLORIDE  107   --    --    --   105  105  105   --   104  103   < >  107   < >  108  106   < >  108   CO2  20   --    --    --   19*  24  24   --   21  28   < >  33*   < >  36*  36*   < >  32   ANIONGAP  8   --    --    --   9  7  7   --   10  5   < >  2*   < >  5  4   < >  8   BUN  34*   --    --    --   39*  32*  32*   --   31*  27   < >  31*   < >  37*  40*   < >  46*   CR  0.62   --    --    --   0.73  0.74  0.71   --   0.66  0.66   < >  0.74   < >  0.75  0.78   < >  1.85*   GFRESTIMATED  >90  Non  GFR Calc     --    --    --   83  81  85   --   >90  Non  GFR Calc    >90  Non  GFR Calc     < >  81   < >  80  77   < >  28*   GLC  152*   --    --    --   179*  79  149*   --   159*  141*   < >  112*   < >  135*  144*   < >  138*   A1C   --    --    --    --    --    --    --    --    --    --    --    --    --    --    --    --   Canceled, Test credited   UNABLE TO CALCULATE % HBA1C DUE TO LOW HGB AND/OR LOW HGBA1C  NOTIFIED CHELSEA BEE RN AT 1253 ON 5/31/17 [facility]     VIKTORIYA  7.2*   --    --    --   7.4*  7.5*  7.5*   --   7.8*  7.4*   < >  7.5*   < >  7.4*  7.4*   < >  7.6*   PHOS   --    --    --    --    --    --    --    --    --    --    --    --    --   3.3  1.7*   < >   --    MAG  1.8   --    --    --   2.0  1.7  1.8   --    --   1.7   < >   --    --   1.9   --    < >  2.3   LACT   --   2.1  2.7*   < >   --   1.8  1.8   < >   --    --    < >   --    < >   --    --    < >   --    CKT   --    --    --    --    --    --    --    --    --    --    --   23*   --    --    --     --    --     < > = values in this interval not displayed.       Hepatic Studies    Recent Labs   Lab Test  06/19/17   0608  06/16/17   0609  06/15/17   0556  06/12/17   0413  06/11/17   0400   06/05/17   0354   06/01/17   0342  05/31/17   1018   BILITOTAL  0.7  0.8  0.8   --    --    --   1.7*   --   2.6*  2.4*   ALKPHOS  180*  159*  168*   --    --    --   149   --   125  129   ALBUMIN  1.8*  1.8*  1.8*   --    --    --   1.9*   --   1.9*  1.9*   AST  41  34  31   --    --    --   43   --   597*  765*   ALT  63*  60*  73*   --    --    --   213*   --   891*  939*   LDH   --    --    --   203  219   < >  353*   < >  583*   --     < > = values in this interval not displayed.       Pancreatitis testing    Recent Labs   Lab Test  05/30/17   0939  04/29/16   0749 03/15/16  02/04/11   0909 02/04/11   LIPASE  88   --    --    --    --    TRIG   --   251*  286  80  80       Hematology Studies      Recent Labs   Lab Test  06/22/17   0653  06/21/17   0623  06/20/17   0709  06/19/17   1612  06/19/17   0608  06/16/17   0609   06/15/17   0556   06/05/17   0531   WBC  2.5*  2.8*  2.8*  3.1*  2.5*  2.3*   < >  2.3*   < >  3.6*   ANEU  1.7  1.9  1.7   --    --   1.4*   --   1.6   --   3.0   ALYM  0.5*  0.5*  0.5*   --    --   0.5*   --   0.5*   --   0.6*   TERRA  0.2  0.4  0.4   --    --   0.2   --   0.3   --   0.0   AEOS  0.1  0.1  0.2   --    --   0.2   --   0.1   --   0.0   HGB  7.9*  9.5*  8.7*  9.0*  10.2*  9.2*   < >  9.6*   < >  8.6*   HCT  23.7*  28.3*  25.7*  26.4*  30.6*  27.9*   < >  29.8*   < >  27.1*   PLT  33*  43*  50*  44*  51*  51*   < >  52*   < >  15*    < > = values in this interval not displayed.       Clotting Studies    Recent Labs   Lab Test  06/22/17   0653  06/21/17   0623  06/20/17   0709  06/19/17   1612   05/30/17   1545  05/30/17   0939  05/30/17   0347   INR  1.33*  1.42*  1.29*  1.44*   < >  4.38*  4.45*  3.92*   PTT   --    --    --   31   --   42*  44*  40*    < > = values in this interval not  displayed.       Arterial Blood Gas Testing    Recent Labs   Lab Test  06/04/17   0913  06/04/17   0408  06/03/17   1558  06/03/17   0905  06/02/17 2008   PH  7.44  7.45  7.40  7.35  7.45   PCO2  45  45  50*  53*  44   PO2  94  90  93  85  104   HCO3  30*  31*  31*  29*  31*   O2PER  40  40  40.0  40.0  40%        Urine Studies     Recent Labs   Lab Test  06/19/17   1240  05/29/17   1305  05/21/17   0800  03/20/16   1702  01/14/15   1137   URINEPH  5.5  6.0  5.5  5.5  5.5   NITRITE  Positive*  Negative  Positive*  Negative  Negative   LEUKEST  Negative  Negative  Large*  Negative  Negative   WBCU  2  47*  >182*  <1  1       Microbiology:  6/19/17 Blood cx: NGTD  6/19/17 Wound Left Upper extremity cx:  Heavy growth Enterobacter cloacae complex         Moderate growth Enterococcus faecalis         Moderate growth Coagulase negative Staphylococcus   Culture & Susceptibility      HEAVY GROWTH ENTEROBACTER CLOACAE COMPLEX (MARIA R)      Antibiotic Sensitivity Unit Status     AMIKACIN <=2 Susceptible ug/mL Final     AMPICILLIN >=32 Resistant ug/mL Final     AMPICILLIN/SULBACTAM >=32 Resistant ug/mL Final     CEFEPIME <=1 Susceptible ug/mL Final     CEFTAZIDIME >=64 Resistant ug/mL Final     CEFTRIAXONE >=64 Resistant ug/mL Final     CIPROFLOXACIN <=0.25 Susceptible ug/mL Final     GENTAMICIN <=1 Susceptible ug/mL Final     LEVOFLOXACIN <=0.12 Susceptible ug/mL Final     MEROPENEM <=0.25 Susceptible ug/mL Final     Piperacillin/Tazo >=128 Resistant ug/mL Final     TOBRAMYCIN <=1 Susceptible ug/mL Final     Trimethoprim/Sulfa <=1/19 Susceptible ug/mL Final          6/19/17 Urine cx: 50,000 to 100,000 colonies/mL mixed urogenital harshal       Last check of C difficile  C Diff Toxin B PCR   Date Value Ref Range Status   06/21/2017  NEG Final    Negative  Negative: Clostridium difficile target DNA sequences NOT detected, presumed   negative for Clostridium difficile toxin B or the number of bacteria present   may be below the  limit of detection for the test.   FDA approved assay performed using [employer] GeneXpert real-time PCR.   A negative result does not exclude actual disease due to Clostridium difficile   and may be due to improper collection, handling and storage of the specimen or   the number of organisms in the specimen is below the detection limit of the   assay.           Imaging:  MRI of the left elbow dated 6/19/2017.  IMPRESSION:  1. No marrow signal abnormalities within the left elbow to suggest  osteomyelitis.  2. Diffuse soft tissue edema within the subcutaneous tissues about the  elbow joint, findings most consistent with cellulitis.  3. Diffuse multicompartment soft tissue tissue edema within the  muscles about the left elbow, with a focal area of nonenhancement,  centered in the brachialis muscle. Differential includes myositis,  including infectious or inflammatory etiology. A focal area of  myonecrosis is likely within the brachialis muscle. Correlate  clinically.  4. Partial-thickness tearing of the common extensor tendon    XR LEFT ELBOW 6/19/17  Impression:   1. No radiographic evidence to suggest osteomyelitis.  2. Diffuse subcutaneous soft tissue stranding, may represent  cellulitis in the provided clinical setting.    CXR 6/19/17  IMPRESSION: Multiple lines and tubes overlying the patient. Small  right pleural effusion is unchanged. No left pleural effusion. No  pneumothorax. Enteric jejunal feeding tube is unchanged. Hazy  opacities are seen throughout the left lung, unchanged. Elevation of  the right hemidiaphragm is again noted

## 2017-06-22 NOTE — PLAN OF CARE
Problem: Goal Outcome Summary  Goal: Goal Outcome Summary  Edema 5B: Wraps removed and skin cares performed. Skin remains intact with 2+ pitting throughout B LEs. Reapplication of GCB - Full leg wraps from MTPs to mid thigh on R LE and half leg wrap from MTPs to knee creases on L LE. Will continue to alternate full leg wraps in order to manage B LE but still allow for increased ease with functional mobility. Remove wraps if causing pain/numbness or become soiled.

## 2017-06-22 NOTE — PLAN OF CARE
Problem: Goal Outcome Summary  Goal: Goal Outcome Summary  PT 5B: Attempted PT this PM. Pt declined due to SOB and fatigue. Rescheduled for 6/23/17.

## 2017-06-22 NOTE — PHARMACY-VANCOMYCIN DOSING SERVICE
Pharmacy Vancomycin Note  Date of Service 2017  Patient's  1960   56 year old, female    Indication: Skin and Soft Tissue Infection; possible myositis  Goal Trough Level: 15-20 mg/L  Day of Therapy: 4  Current Vancomycin regimen:  1,250 mg IV q12h    Current estimated CrCl = Estimated Creatinine Clearance: 128.6 mL/min (based on Cr of 0.62).    Creatinine for last 3 days  2017:  4:12 PM Creatinine 0.71 mg/dL  2017:  7:09 AM Creatinine 0.74 mg/dL  2017:  6:23 AM Creatinine 0.73 mg/dL  2017:  6:53 AM Creatinine 0.62 mg/dL    Recent Vancomycin Levels (past 3 days)  2017: 11:32 PM Vancomycin Level 23.3 mg/L    Vancomycin IV Administrations (past 72 hours)                   vancomycin (VANCOCIN) 1,250 mg in NaCl 0.9 % 250 mL intermittent infusion (mg) 1,250 mg New Bag 17 1213     1,250 mg New Bag  0021    vancomycin (VANCOCIN) 1,500 mg in NaCl 0.9 % 250 mL intermittent infusion (mg) 1,500 mg New Bag 17 1137     1,500 mg New Bag  0105     1,500 mg New Bag 17 1306     1,500 mg New Bag  0004                Nephrotoxins and other renal medications (Future)    Start     Dose/Rate Route Frequency Ordered Stop    17 0000  vancomycin (VANCOCIN) 1,250 mg in NaCl 0.9 % 250 mL intermittent infusion      1,250 mg  over 90 Minutes Intravenous EVERY 12 HOURS 17 1226               Contrast Orders - past 72 hours (72h ago through future)    Start     Dose/Rate Route Frequency Ordered Stop    17  gadobutrol (GADAVIST) injection 7.5 mL      7.5 mL Intravenous ONCE 17          Interpretation of levels and current regimen:  Trough level is  Supratherapeutic    Has serum creatinine changed > 50% in last 72 hours: No    Urine output:  unable to determine    Renal Function: Stable    Plan:  1.  Continue Current Dose of 1250 mg q12h. Previous dose preemptively decreased to 1250 mg while trough level pending, will continue this dose -  initial dose was 19 mg/kg q12h.  2.  Pharmacy will check trough levels as appropriate in 1-3 Days (planned next trough lab before noon dose on 6/23/17) .    3. Serum creatinine levels will be ordered daily for the first week of therapy and at least twice weekly for subsequent weeks.      Romeo Wilkes, PharmD student        .

## 2017-06-22 NOTE — PLAN OF CARE
Problem: Goal Outcome Summary  Goal: Goal Outcome Summary  Outcome: No Change  D/I/A: Patient A & O X 3,   Heart rate 118- 120 , other vitals stable. Patient on telemetry. Pt  Received schedule medications without problem . C/o left arm pain and given Tylenol 650 mg po x 1.   Dressing changed to LUE, and LUE elevated on pillow . . Patient appetite fair . Repositioned Q 2hr and as tolerated .  Incontinence of bladder and bowel. Continue monitor closely and update MD with concerns.

## 2017-06-22 NOTE — PLAN OF CARE
Problem: Goal Outcome Summary  Goal: Goal Outcome Summary  OT/5B: Pt with other providers upon attempts, will reschedule

## 2017-06-23 ENCOUNTER — HOSPITAL ENCOUNTER (INPATIENT)
Facility: CLINIC | Age: 57
End: 2017-06-23
Admitting: PHYSICAL MEDICINE & REHABILITATION

## 2017-06-23 ENCOUNTER — APPOINTMENT (OUTPATIENT)
Dept: PHYSICAL THERAPY | Facility: CLINIC | Age: 57
DRG: 871 | End: 2017-06-23
Attending: INTERNAL MEDICINE
Payer: COMMERCIAL

## 2017-06-23 ENCOUNTER — APPOINTMENT (OUTPATIENT)
Dept: SPEECH THERAPY | Facility: CLINIC | Age: 57
DRG: 871 | End: 2017-06-23
Attending: INTERNAL MEDICINE
Payer: COMMERCIAL

## 2017-06-23 ENCOUNTER — APPOINTMENT (OUTPATIENT)
Dept: OCCUPATIONAL THERAPY | Facility: CLINIC | Age: 57
DRG: 871 | End: 2017-06-23
Attending: INTERNAL MEDICINE
Payer: COMMERCIAL

## 2017-06-23 LAB
ANION GAP SERPL CALCULATED.3IONS-SCNC: 5 MMOL/L (ref 3–14)
BACTERIA SPEC CULT: ABNORMAL
BUN SERPL-MCNC: 39 MG/DL (ref 7–30)
CALCIUM SERPL-MCNC: 7.2 MG/DL (ref 8.5–10.1)
CHLORIDE SERPL-SCNC: 106 MMOL/L (ref 94–109)
CO2 SERPL-SCNC: 20 MMOL/L (ref 20–32)
CREAT SERPL-MCNC: 0.78 MG/DL (ref 0.52–1.04)
ERYTHROCYTE [DISTWIDTH] IN BLOOD BY AUTOMATED COUNT: 20.8 % (ref 10–15)
GFR SERPL CREATININE-BSD FRML MDRD: 76 ML/MIN/1.7M2
GLUCOSE BLDC GLUCOMTR-MCNC: 109 MG/DL (ref 70–99)
GLUCOSE BLDC GLUCOMTR-MCNC: 138 MG/DL (ref 70–99)
GLUCOSE BLDC GLUCOMTR-MCNC: 144 MG/DL (ref 70–99)
GLUCOSE BLDC GLUCOMTR-MCNC: 161 MG/DL (ref 70–99)
GLUCOSE BLDC GLUCOMTR-MCNC: 172 MG/DL (ref 70–99)
GLUCOSE SERPL-MCNC: 154 MG/DL (ref 70–99)
HCT VFR BLD AUTO: 24.8 % (ref 35–47)
HGB BLD-MCNC: 8.1 G/DL (ref 11.7–15.7)
INTERPRETATION ECG - MUSE: NORMAL
LACTATE BLD-SCNC: 2.1 MMOL/L (ref 0.7–2.1)
Lab: ABNORMAL
MCH RBC QN AUTO: 30.9 PG (ref 26.5–33)
MCHC RBC AUTO-ENTMCNC: 32.7 G/DL (ref 31.5–36.5)
MCV RBC AUTO: 95 FL (ref 78–100)
MICRO REPORT STATUS: ABNORMAL
MICROORGANISM SPEC CULT: ABNORMAL
MICROORGANISM SPEC CULT: ABNORMAL
PLATELET # BLD AUTO: 27 10E9/L (ref 150–450)
POTASSIUM SERPL-SCNC: 5.4 MMOL/L (ref 3.4–5.3)
RBC # BLD AUTO: 2.62 10E12/L (ref 3.8–5.2)
SODIUM SERPL-SCNC: 131 MMOL/L (ref 133–144)
SPECIMEN SOURCE: ABNORMAL
VANCOMYCIN SERPL-MCNC: 26.6 MG/L
WBC # BLD AUTO: 3.4 10E9/L (ref 4–11)

## 2017-06-23 PROCEDURE — 25000132 ZZH RX MED GY IP 250 OP 250 PS 637: Performed by: INTERNAL MEDICINE

## 2017-06-23 PROCEDURE — 25000125 ZZHC RX 250: Performed by: INTERNAL MEDICINE

## 2017-06-23 PROCEDURE — 36592 COLLECT BLOOD FROM PICC: CPT | Performed by: INTERNAL MEDICINE

## 2017-06-23 PROCEDURE — 99233 SBSQ HOSP IP/OBS HIGH 50: CPT | Performed by: INTERNAL MEDICINE

## 2017-06-23 PROCEDURE — 99222 1ST HOSP IP/OBS MODERATE 55: CPT | Performed by: INTERNAL MEDICINE

## 2017-06-23 PROCEDURE — 85027 COMPLETE CBC AUTOMATED: CPT | Performed by: INTERNAL MEDICINE

## 2017-06-23 PROCEDURE — 97110 THERAPEUTIC EXERCISES: CPT | Mod: GP

## 2017-06-23 PROCEDURE — 80202 ASSAY OF VANCOMYCIN: CPT | Performed by: INTERNAL MEDICINE

## 2017-06-23 PROCEDURE — 80048 BASIC METABOLIC PNL TOTAL CA: CPT | Performed by: INTERNAL MEDICINE

## 2017-06-23 PROCEDURE — 92523 SPEECH SOUND LANG COMPREHEN: CPT | Mod: GN | Performed by: SPEECH-LANGUAGE PATHOLOGIST

## 2017-06-23 PROCEDURE — 27210436 ZZH NUTRITION PRODUCT SEMIELEM INTERMED CAN

## 2017-06-23 PROCEDURE — 40000802 ZZH SITE CHECK

## 2017-06-23 PROCEDURE — 25000128 H RX IP 250 OP 636: Performed by: INTERNAL MEDICINE

## 2017-06-23 PROCEDURE — 36415 COLL VENOUS BLD VENIPUNCTURE: CPT | Performed by: INTERNAL MEDICINE

## 2017-06-23 PROCEDURE — 97535 SELF CARE MNGMENT TRAINING: CPT | Mod: GO

## 2017-06-23 PROCEDURE — 83605 ASSAY OF LACTIC ACID: CPT | Performed by: INTERNAL MEDICINE

## 2017-06-23 PROCEDURE — 92507 TX SP LANG VOICE COMM INDIV: CPT | Mod: GN | Performed by: SPEECH-LANGUAGE PATHOLOGIST

## 2017-06-23 PROCEDURE — 25000132 ZZH RX MED GY IP 250 OP 250 PS 637

## 2017-06-23 PROCEDURE — 25000132 ZZH RX MED GY IP 250 OP 250 PS 637: Performed by: STUDENT IN AN ORGANIZED HEALTH CARE EDUCATION/TRAINING PROGRAM

## 2017-06-23 PROCEDURE — 40000225 ZZH STATISTIC SLP WARD VISIT: Performed by: SPEECH-LANGUAGE PATHOLOGIST

## 2017-06-23 PROCEDURE — 00000146 ZZHCL STATISTIC GLUCOSE BY METER IP

## 2017-06-23 PROCEDURE — 40000133 ZZH STATISTIC OT WARD VISIT

## 2017-06-23 PROCEDURE — 12000008 ZZH R&B INTERMEDIATE UMMC

## 2017-06-23 PROCEDURE — 40000193 ZZH STATISTIC PT WARD VISIT

## 2017-06-23 PROCEDURE — 25000128 H RX IP 250 OP 636: Performed by: STUDENT IN AN ORGANIZED HEALTH CARE EDUCATION/TRAINING PROGRAM

## 2017-06-23 RX ADMIN — ATENOLOL 50 MG: 50 TABLET ORAL at 08:13

## 2017-06-23 RX ADMIN — CALCIUM CARBONATE 600 MG (1,500 MG)-VITAMIN D3 400 UNIT TABLET 2 TABLET: at 08:14

## 2017-06-23 RX ADMIN — FOLIC ACID 1 MG: 1 TABLET ORAL at 08:14

## 2017-06-23 RX ADMIN — INSULIN ASPART 1 UNITS: 100 INJECTION, SOLUTION INTRAVENOUS; SUBCUTANEOUS at 12:25

## 2017-06-23 RX ADMIN — OXYCODONE HYDROCHLORIDE AND ACETAMINOPHEN 500 MG: 500 TABLET ORAL at 08:13

## 2017-06-23 RX ADMIN — SODIUM CHLORIDE, PRESERVATIVE FREE 5 ML: 5 INJECTION INTRAVENOUS at 13:56

## 2017-06-23 RX ADMIN — INSULIN ASPART 1 UNITS: 100 INJECTION, SOLUTION INTRAVENOUS; SUBCUTANEOUS at 18:43

## 2017-06-23 RX ADMIN — Medication 2 G: at 09:59

## 2017-06-23 RX ADMIN — Medication 2 G: at 20:23

## 2017-06-23 RX ADMIN — APIXABAN 2.5 MG: 2.5 TABLET, FILM COATED ORAL at 20:22

## 2017-06-23 RX ADMIN — GABAPENTIN 200 MG: 100 CAPSULE ORAL at 08:14

## 2017-06-23 RX ADMIN — CETIRIZINE HYDROCHLORIDE 10 MG: 10 TABLET, FILM COATED ORAL at 08:14

## 2017-06-23 RX ADMIN — GABAPENTIN 200 MG: 100 CAPSULE ORAL at 15:29

## 2017-06-23 RX ADMIN — TRIAMCINOLONE ACETONIDE: 1 OINTMENT TOPICAL at 20:23

## 2017-06-23 RX ADMIN — TRIAMCINOLONE ACETONIDE: 1 OINTMENT TOPICAL at 08:15

## 2017-06-23 RX ADMIN — TRAMADOL HYDROCHLORIDE 25 MG: 50 TABLET, FILM COATED ORAL at 20:22

## 2017-06-23 RX ADMIN — PREDNISONE 10 MG: 10 TABLET ORAL at 08:15

## 2017-06-23 RX ADMIN — VANCOMYCIN HYDROCHLORIDE 1250 MG: 10 INJECTION, POWDER, LYOPHILIZED, FOR SOLUTION INTRAVENOUS at 00:43

## 2017-06-23 RX ADMIN — APIXABAN 2.5 MG: 2.5 TABLET, FILM COATED ORAL at 08:18

## 2017-06-23 RX ADMIN — VANCOMYCIN HYDROCHLORIDE 1750 MG: 10 INJECTION, POWDER, LYOPHILIZED, FOR SOLUTION INTRAVENOUS at 18:44

## 2017-06-23 RX ADMIN — ERTAPENEM SODIUM 1 G: 1 INJECTION, POWDER, LYOPHILIZED, FOR SOLUTION INTRAMUSCULAR; INTRAVENOUS at 12:24

## 2017-06-23 RX ADMIN — Medication 220 MG: at 08:15

## 2017-06-23 RX ADMIN — GABAPENTIN 200 MG: 100 CAPSULE ORAL at 20:23

## 2017-06-23 RX ADMIN — MULTIPLE VITAMINS W/ MINERALS TAB 1 TABLET: TAB at 08:14

## 2017-06-23 RX ADMIN — Medication 30 MG: at 08:14

## 2017-06-23 RX ADMIN — ATENOLOL 25 MG: 25 TABLET ORAL at 17:32

## 2017-06-23 RX ADMIN — PANTOPRAZOLE SODIUM 40 MG: 40 TABLET, DELAYED RELEASE ORAL at 08:15

## 2017-06-23 RX ADMIN — Medication 25000 UNITS: at 08:14

## 2017-06-23 NOTE — PLAN OF CARE
Problem: Goal Outcome Summary  Goal: Goal Outcome Summary  Outcome: No Change  VSS, O2 at 2 LPM via nasal cannula. Up in chair for breakfast with 2 person assist plus ceiling lift, 1 fecal incontinence episode after breakfast. Regular diet, adequate appetite. Repositioning to and from chair and bed elevated HR and RR. Pt shivering once back in bed, temp at 96.7, gave pt blanket. Changed wound dressing on LUE, clean, dry and intact. Numbness and tingling present in left 5 fingers. Refused chair for lunch, repositioned with 2 person assist to HOB ~40 degrees sitting upright to eat. BP a little low at lunch (89/31), continue to monitor.

## 2017-06-23 NOTE — PROGRESS NOTES
Addendum:     Talked to patient and  at bedside along with cardiology team.   Rec: continue current atenolol dosing for afib w rvr. No digoxin. TTE limited for vol status.   See cardiology note for details.   Will consider lasix 20 mg po x 1, to start with if e/o vol overload.   Signed out to cross cover team.   Patient agrees with the plan.     Johnie Chino MD   Hospitalist (Internal Medicine)  Pager: [phone].

## 2017-06-23 NOTE — PROGRESS NOTES
Pt a/o x 3. VSS on 2 L NC. Pt took tramadol for pain in LUE, increased swelling and elevated on pillows. Lymphedema wraps to BLE. K+ and mag replaced, rechecks in am. Good appetite, on calorie counts and TF at 40 ml/hr from 6pm-6am per pt's preference. BG stable. Incontenent of bowel and bladder this shift, otherwise her mobility is up assist 1 with walker. Will continue to monitor pt status.

## 2017-06-23 NOTE — PLAN OF CARE
"Problem: Goal Outcome Summary  Goal: Goal Outcome Summary  OT/5B: Pt declining OOB/EOB activity as just getting \"settled\" in bed. Spouse present. Issued theraputty, foam blocks and hand/finger exercise handouts to increase functional movement and ind for ADLs. Pt able to perform yellow foam exercises with demos and min cues. Able to perform pinch and whole hand squeezes with putty with rest breaks. Provided education on stretches for fingers, focusing on thumb and index finger. Recommended pt completes 2x per day with focus mostly on L hand.     Per plan established by the OT, the recommendation for dc location is ARU.          "

## 2017-06-23 NOTE — PROGRESS NOTES
Calorie Counts    Intake Recorded For: 6/22 Kcals: 421 Protein: 29g    # Meals Recorded: 1 meal (100% rice chex, fruit ice, 8 oz 1% milk, chicken salad, 25% coco crackers)    # Supplements Recorded: 0

## 2017-06-23 NOTE — PLAN OF CARE
Problem: Goal Outcome Summary  Goal: Goal Outcome Summary  PT/5B: Pt declining OOB activity due to fatigue. Pt tachy at rest while supine in bed. RN approved session. Pt engaged in supine BLE strengthening exercise to improve functional mobility. DAE JUNE provided     Rec. DC to ARU once medically stable

## 2017-06-23 NOTE — PROGRESS NOTES
[facility] General Infectious Disease Inpatient Consultation      Amelia Michel MRN# [id number]   YOB: 1960 Age: 56 year old   Date of Admission and time: 6/19/2017  2:54 PM     Reason for consult: I was asked by Dr. Chino  to evaluate this patient for LUE wound infection,cellulitis.           Assessment and Plan:   Recommendations:  1. D/c Vancomycin.    2. Continue Ertapenem  3. Would reevaluate and consider transitioning antibiotics following clinical improvement of LUE wound. If stable tomorrow, consider dropping ertapenem for Bactrim 1 double strength tablet q12h    Assessment:  # Left Upper extremity wound and soft tissue infection- cellulitis    Amelia Michel is a 56 year old woman with hx of VSD repair (1969), Rheumatoid arthritis, and a recent diagnosis of Afib/Aflutter/SVT, who presented to [facility] on 5/29 after Out of Hospital cardiac arrest.  After ROSC in the field, she was admitted from 5/29-6/15, extubated 6/4. While at ARU (6/15-6/19), pt developed worsening LUE wound pain and fever, and was admitted back to [facility] on 6/19/17 for further workup.     The patient first developed the LUE wound in her previous admission on 5/29. It is unclear as to what incited the wound but is thought to be secondary to IV extravasation. On chart review, it seems that she initially developed blisters and sloughed off epidermis around multiple old IV site bruises and edema up to the shoulder. 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 No DVTs. Vascular surgery consult at that time showed no indication for surgical intervention.  She received Vancomycin (5/29-5/31), meropenem (5/29-6/3) and ertapenem(6/3-6/4) during that hospitalization for Aspiration PNA and ESBL UTI.    Following her discharge, the patient experienced worsening of the wound pain and had  fevers which prompted admission. Wound culture on 6/19/17 grew Enterobacter cloacae complex, enterococcus faecalis and coagulase negative staphylococcus. On examination, the wound does show signs of inflammation c/w cellulitis though unclear if it is chemical or infectious.  The wound has been receding as per the patient since this admission, suggesting that either more effective wound cares or antibiotics or both may be improving the wound. As of now, would discontinue Vancomycin and monitor progress on ertapenem.  In agreement with AMT, Bactrim is reasonable to target the Enterobacter in the wound and would consider transitioning and reevaluate after therapy with ertapenem.    Thank you very much Dr. Chino for your kind consultation and for involving me in the care of Mrs. Amelia Michel. I will keep in touch with you, and please do not hesitate to call me for any question.     Monserrat Hdadad   Pager: [phone]  06/23/2017     Scribe Disclosure:   I, Monserrat Haddad, am serving as a scribe; to document services personally performed by MEME Chow- -based on data collection and the provider's statements to me.   Provider Disclosure:  I agree with above History, Review of Systems, Physical exam and Plan.  I have reviewed the content of the documentation and have edited it as needed. I have personally performed the services documented here and the documentation accurately represents those services and the decisions I have made.      MEME Chow M.D.  [facility] Service Staff  [phone]           Interim  History :   No acute events overnight. Wound unchanged. Patient endorses tingling and numbness of her left hand, particularly her thumb and index finger. No fevers. Overall pain is stable.          History of Present Illness:   This patient is a 56 year old female with hx of VSD repair (1969), Rheumatoid arthritis, and a recent diagnosis of Afib/Aflutter/SVT, who presented to [facility] on 5/29 after Out of  Hospital cardiac arrest with shockable rhythm. After ROSC in the field, she was admitted from 5/29-6/15, extubated 6/4. While at ARU (6/15-6/19), pt developed worsening LUE wound pain and fever, and was admitted back to [facility] for further workup.     Patient first developed a fever up to 101 on 6/18/17 which was associated with chills and diaphoresis. She states that her left upper extremity wound first occurred around [facility] day, around the time of the cardiac arrest, and felt that it got worse when fluid extravasated into the wound from her IV. Currently, the wound is red, hot to touch with swelling extending up to the shoulder. While at ARU, pt was noticing worsening pain in LUE while performing therapeutic exercises . She also complains of weakness in movement of left index finger and thumb and wrist pain. Denies any chest pain, SOB, nausea/vomiting or abdominal pain. Denies any other wounds or sores. She is quite diaphoretic during our interview. Blood, wound and urine cultures were obtained on admission and patient was started on Vancomycin and Ertapenem. Levaquin was added on 6/20. She is PCN allergic (she reports tongue swelling when she was 6 years old).                Past Medical History:     Past Medical History:   Diagnosis Date     Hypertension      Rheumatoid arthritis(714.0)              Past Surgical History:     Past Surgical History:   Procedure Laterality Date     ANESTHESIA CARDIOVERSION N/A 5/17/2017    Procedure: ANESTHESIA CARDIOVERSION;  ANESTHESIA CARDIOVERSION;  Surgeon: GENERIC ANESTHESIA PROVIDER;  Location: [facility] OR     ARTHRODESIS WRIST  2000    Right wrist     FOOT SURGERY      4 left and 2 right     RELEASE CARPAL TUNNEL BILATERAL       REPAIR VENTRICULAR SEPTAL DEFECT  1969               Social History:     Social History   Substance Use Topics     Smoking status: Never Smoker     Smokeless tobacco: Never Used     Alcohol use Yes      Comment: Glass of wine two evenings a night              Family History:   I have reviewed this patient's family history  Family History   Problem Relation Age of Onset     Breast Cancer Mother      Hypertension Mother      Alzheimer Disease Mother      Hypertension Father      Blood Disease Father      Lymphoa     Circulatory Father      A Fib     DIABETES Paternal Grandmother             Immunizations:     Immunization History   Administered Date(s) Administered     Pneumococcal 23 valent 08/31/2011     TDAP Vaccine (Adacel) 02/08/2011             Allergies:     Allergies   Allergen Reactions     Penicillins      Tape [Adhesive Tape] Rash             Medications:       vancomycin (VANCOCIN) IV  1,750 mg Intravenous Q24H     pantoprazole  40 mg Oral QAM     sodium chloride (PF)  10 mL Intracatheter Q8H     heparin lock flush  5-10 mL Intracatheter Q24H     apixaban ANTICOAGULANT  2.5 mg Oral BID     ascorbic acid  500 mg Oral Daily     atenolol  50 mg Oral Daily     beta carotene  25,000 Units Oral Daily     cetirizine  10 mg Oral Daily     ertapenem  1 g Intravenous Q24H     folic acid  1 mg Oral Daily     gabapentin  200 mg Oral TID     morphine 0.1% in solosite  2 g Transdermal TID     multivitamin, therapeutic with minerals  1 tablet Oral Daily     triamcinolone   Topical BID     zinc sulfate  220 mg Oral Daily     sodium chloride (PF)  3 mL Intracatheter Q8H     insulin aspart  1-7 Units Subcutaneous TID AC     insulin aspart  1-5 Units Subcutaneous At Bedtime     calcium-vitamin D  2 tablet Oral Daily     co-enzyme Q-10  30 mg Oral Daily     melatonin  1 mg Oral At Bedtime     predniSONE  10 mg Oral Daily       Infusions/Drips:    IV fluid REPLACEMENT ONLY       - MEDICATION INSTRUCTIONS -       - MEDICATION INSTRUCTIONS -                Review of Systems:   ROS:  All 12 systems reviewed.  Relevant positives/negatives noted in HPI above                 Physical Exam:   Temp: 97.2  F (36.2  C) Temp src: Oral BP: (!) 89/31 Pulse: 101 Heart Rate: 120 Resp: 16  SpO2: 98 % O2 Device: Nasal cannula Oxygen Delivery: 2 LPM    Wt Readings from Last 4 Encounters:   06/22/17 81.6 kg (179 lb 12.7 oz)   06/19/17 75.7 kg (166 lb 12.8 oz)   06/16/17 72.8 kg (160 lb 8 oz)   05/23/17 64.4 kg (141 lb 15.6 oz)       Constitutional: awake, alert, cooperative, no apparent distress and appears at stated age, well nourished.   Head, ENT, Eyes, and Neck: Normocephalic, sinuses non-tender to palpation, external ears without lesions, moist buccal mucosa without oral thrush   PERRL, EOMI, pink conjunctivae, non-icteric sclera.   Neck supple without rigidity, no cervical/axillary/inguinal LA bilaterally.   Lungs: CTA bilaterally, no accessory muscle use, no dullness to percussion and no abnormal tactile fremitus.   CVS : irregularly irregular rhythm, variable S1/S2, no murmur, PMI was not displaced.   Abdomen: non-tender, non-distended, no masses, no bruit, no shifting dullness, normal BS.   Extremities : LUE wound with overlying eschar and fibrinous material, with surrounding warmth, edema and erythema, no active drainage, wrist tenderness, +1 pitting edema of bilateral lower extremities, normal ROM of all joints           Data:   Inflammatory Markers    Recent Labs   Lab Test  06/20/17   0709  06/19/17   1612  06/04/17   0408  06/03/17   0307  06/02/17   0342  06/01/17   0342  05/31/17   0357  05/30/17   0347  05/29/17   1055   03/20/16   1738   SED   --   63*   --    --    --    --    --   34*  22   --   29   CRP  110.0*  98.0*  23.0*  29.0*  55.0*  98.0*  170.0*  110.0*  58.0*   < >  28.4*    < > = values in this interval not displayed.       Metabolic Studies       Recent Labs   Lab Test  06/22/17   1537  06/22/17   0653  06/21/17   1958   06/21/17   0623  06/20/17   0709  06/19/17   1612   06/19/17   0608  06/16/17   0609   06/09/17   0449   06/07/17   0422  06/06/17   1654   05/31/17   1018   NA   --   135   --    --   133  136  136   --   135  136   < >  142   < >  148*  146*   < >  148*    POTASSIUM   --   4.0   --    --   4.6  4.6  4.7   --   4.6  4.0   < >  4.1   < >  3.6  4.1   < >  4.3   CHLORIDE   --   107   --    --   105  105  105   --   104  103   < >  107   < >  108  106   < >  108   CO2   --   20   --    --   19*  24  24   --   21  28   < >  33*   < >  36*  36*   < >  32   ANIONGAP   --   8   --    --   9  7  7   --   10  5   < >  2*   < >  5  4   < >  8   BUN   --   34*   --    --   39*  32*  32*   --   31*  27   < >  31*   < >  37*  40*   < >  46*   CR   --   0.62   --    --   0.73  0.74  0.71   --   0.66  0.66   < >  0.74   < >  0.75  0.78   < >  1.85*   GFRESTIMATED   --   >90  Non  GFR Calc     --    --   83  81  85   --   >90  Non  GFR Calc    >90  Non  GFR Calc     < >  81   < >  80  77   < >  28*   GLC   --   152*   --    --   179*  79  149*   --   159*  141*   < >  112*   < >  135*  144*   < >  138*   A1C   --    --    --    --    --    --    --    --    --    --    --    --    --    --    --    --   Canceled, Test credited   UNABLE TO CALCULATE % HBA1C DUE TO LOW HGB AND/OR LOW HGBA1C  NOTIFIED CHELSEA BEE RN AT 1253 ON 5/31/17 [facility]     VIKTORIYA   --   7.2*   --    --   7.4*  7.5*  7.5*   --   7.8*  7.4*   < >  7.5*   < >  7.4*  7.4*   < >  7.6*   PHOS   --    --    --    --    --    --    --    --    --    --    --    --    --   3.3  1.7*   < >   --    MAG   --   1.8   --    --   2.0  1.7  1.8   --    --   1.7   < >   --    --   1.9   --    < >  2.3   LACT  1.6   --   2.1   < >   --   1.8  1.8   < >   --    --    < >   --    < >   --    --    < >   --    CKT   --    --    --    --    --    --    --    --    --    --    --   23*   --    --    --    --    --     < > = values in this interval not displayed.       Hepatic Studies    Recent Labs   Lab Test  06/19/17   0608  06/16/17   0609  06/15/17   0556  06/12/17   0413  06/11/17   0400   06/05/17   0354   06/01/17   0342  05/31/17   1018   BILITOTAL  0.7  0.8  0.8   --    --    --    1.7*   --   2.6*  2.4*   ALKPHOS  180*  159*  168*   --    --    --   149   --   125  129   ALBUMIN  1.8*  1.8*  1.8*   --    --    --   1.9*   --   1.9*  1.9*   AST  41  34  31   --    --    --   43   --   597*  765*   ALT  63*  60*  73*   --    --    --   213*   --   891*  939*   LDH   --    --    --   203  219   < >  353*   < >  583*   --     < > = values in this interval not displayed.       Pancreatitis testing    Recent Labs   Lab Test  05/30/17   0939  04/29/16   0749 03/15/16  02/04/11   0909 02/04/11   LIPASE  88   --    --    --    --    TRIG   --   251*  286  80  80       Hematology Studies      Recent Labs   Lab Test  06/22/17   0653  06/21/17   0623  06/20/17   0709  06/19/17   1612  06/19/17   0608  06/16/17   0609   06/15/17   0556   06/05/17   0531   WBC  2.5*  2.8*  2.8*  3.1*  2.5*  2.3*   < >  2.3*   < >  3.6*   ANEU  1.7  1.9  1.7   --    --   1.4*   --   1.6   --   3.0   ALYM  0.5*  0.5*  0.5*   --    --   0.5*   --   0.5*   --   0.6*   TERRA  0.2  0.4  0.4   --    --   0.2   --   0.3   --   0.0   AEOS  0.1  0.1  0.2   --    --   0.2   --   0.1   --   0.0   HGB  7.9*  9.5*  8.7*  9.0*  10.2*  9.2*   < >  9.6*   < >  8.6*   HCT  23.7*  28.3*  25.7*  26.4*  30.6*  27.9*   < >  29.8*   < >  27.1*   PLT  33*  43*  50*  44*  51*  51*   < >  52*   < >  15*    < > = values in this interval not displayed.       Clotting Studies    Recent Labs   Lab Test  06/22/17   0653  06/21/17   0623  06/20/17   0709  06/19/17   1612   05/30/17   1545  05/30/17   0939  05/30/17   0347   INR  1.33*  1.42*  1.29*  1.44*   < >  4.38*  4.45*  3.92*   PTT   --    --    --   31   --   42*  44*  40*    < > = values in this interval not displayed.       Arterial Blood Gas Testing    Recent Labs   Lab Test  06/04/17   0913  06/04/17   0408  06/03/17   1558  06/03/17   0905  06/02/17 2008   PH  7.44  7.45  7.40  7.35  7.45   PCO2  45  45  50*  53*  44   PO2  94  90  93  85  104   HCO3  30*  31*  31*  29*  31*   O2PER  40  40   40.0  40.0  40%        Urine Studies     Recent Labs   Lab Test  06/19/17   1240  05/29/17   1305  05/21/17   0800  03/20/16   1702  01/14/15   1137   URINEPH  5.5  6.0  5.5  5.5  5.5   NITRITE  Positive*  Negative  Positive*  Negative  Negative   LEUKEST  Negative  Negative  Large*  Negative  Negative   WBCU  2  47*  >182*  <1  1       Microbiology:  6/19/17 Blood cx: NGTD  6/19/17 Wound Left Upper extremity cx:  Heavy growth Enterobacter cloacae complex         Moderate growth Enterococcus faecalis         Moderate growth Coagulase negative Staphylococcus   Culture & Susceptibility      HEAVY GROWTH ENTEROBACTER CLOACAE COMPLEX (MARIA R)      Antibiotic Sensitivity Unit Status     AMIKACIN <=2 Susceptible ug/mL Final     AMPICILLIN >=32 Resistant ug/mL Final     AMPICILLIN/SULBACTAM >=32 Resistant ug/mL Final     CEFEPIME <=1 Susceptible ug/mL Final     CEFTAZIDIME >=64 Resistant ug/mL Final     CEFTRIAXONE >=64 Resistant ug/mL Final     CIPROFLOXACIN <=0.25 Susceptible ug/mL Final     GENTAMICIN <=1 Susceptible ug/mL Final     LEVOFLOXACIN <=0.12 Susceptible ug/mL Final     MEROPENEM <=0.25 Susceptible ug/mL Final     Piperacillin/Tazo >=128 Resistant ug/mL Final     TOBRAMYCIN <=1 Susceptible ug/mL Final     Trimethoprim/Sulfa <=1/19 Susceptible ug/mL Final        MODERATE GROWTH ENTEROCOCCUS FAECALIS (MARIA R)      Antibiotic Sensitivity Unit Status     AMPICILLIN <=2 Susceptible ug/mL Final     Gentamicin Screen Susceptible  No high level gentamicin resistance found - If no high level gentamicin   resistance is found, combination therapy with an aminoglycoside may be   indicated for serious enterococcal infections such as bacteremia and   endocarditis.  Final     PENICILLIN 2 Susceptible ug/mL Final     VANCOMYCIN 1 Susceptible ug/mL Final              6/19/17 Urine cx: 50,000 to 100,000 colonies/mL mixed urogenital harshal       Last check of C difficile  C Diff Toxin B PCR   Date Value Ref Range Status   06/21/2017   NEG Final    Negative  Negative: Clostridium difficile target DNA sequences NOT detected, presumed   negative for Clostridium difficile toxin B or the number of bacteria present   may be below the limit of detection for the test.   FDA approved assay performed using [employer] Gene[employer]pert real-time PCR.   A negative result does not exclude actual disease due to Clostridium difficile   and may be due to improper collection, handling and storage of the specimen or   the number of organisms in the specimen is below the detection limit of the   assay.           Imaging:  MRI of the left elbow dated 6/19/2017.  IMPRESSION:  1. No marrow signal abnormalities within the left elbow to suggest  osteomyelitis.  2. Diffuse soft tissue edema within the subcutaneous tissues about the  elbow joint, findings most consistent with cellulitis.  3. Diffuse multicompartment soft tissue tissue edema within the  muscles about the left elbow, with a focal area of nonenhancement,  centered in the brachialis muscle. Differential includes myositis,  including infectious or inflammatory etiology. A focal area of  myonecrosis is likely within the brachialis muscle. Correlate  clinically.  4. Partial-thickness tearing of the common extensor tendon    XR LEFT ELBOW 6/19/17  Impression:   1. No radiographic evidence to suggest osteomyelitis.  2. Diffuse subcutaneous soft tissue stranding, may represent  cellulitis in the provided clinical setting.    CXR 6/19/17  IMPRESSION: Multiple lines and tubes overlying the patient. Small  right pleural effusion is unchanged. No left pleural effusion. No  pneumothorax. Enteric jejunal feeding tube is unchanged. Hazy  opacities are seen throughout the left lung, unchanged. Elevation of  the right hemidiaphragm is again noted

## 2017-06-23 NOTE — PROGRESS NOTES
[facility], [place]   Hospitalist Daily Progress Note                                                 Date of Admission:2017  ___________________________________  INTERVAL HISTORY (24 Hrs)/SUBJECTIVE:   Last 24 hr events, care team notes reviewed.     Ongoing issues with afib/flutter with tachycardia. -130s.   Loose stools. Several episodes  Intermittent dyspnea, more with exertion.     No cough or cp   No fever or chills  No NV  Pain controlled.     ROS: 4 point ROS neg other than the symptoms noted above in the interval history.    OBJECTIVE :   VITALS:    Temp:  [95.3  F (35.2  C)-97.7  F (36.5  C)] 97.2  F (36.2  C)  Pulse:  [101] 101  Heart Rate:  [] 120  Resp:  [16-20] 16  BP: ()/(31-66) 89/31  SpO2:  [98 %-100 %] 98 %    Temp (24hrs), Av.6  F (35.9  C), Min:95.3  F (35.2  C), Max:97.7  F (36.5  C)    Wt Readings from Last 5 Encounters:   17 81.6 kg (179 lb 12.7 oz)   17 75.7 kg (166 lb 12.8 oz)   17 72.8 kg (160 lb 8 oz)   17 64.4 kg (141 lb 15.6 oz)   17 61.6 kg (135 lb 12.8 oz)            PHYSICAL EXAM:  General: alert, interactive, NAD, Nasal feeding tube.   HEENT: AT/NC,  Moist MM  Neck: Supple,  Respi/Chest: Non labored. Clear BL, poor effort.    CVS/Heart: S1S2 irregular. Life vest+  bl pedal edema++  Gi/Abd: Soft, non tender, non distended,  MSK/Ext: Distal pulses 2+.     Neuro: AO x 4,      Medications:   I have reviewed this patient's current medications.    Data:   All laboratory and imaging data in the past 24 hours reviewed:    LABS:  CMP    Recent Labs  Lab 17  0653 17  0623 17  0709 17  1612 17  0608    133 136 136 135   POTASSIUM 4.0 4.6 4.6 4.7 4.6   CHLORIDE 107 105 105 105 104   CO2 20 19* 24 24 21   ANIONGAP 8 9 7 7 10   * 179* 79 149* 159*   BUN 34* 39* 32* 32* 31*   CR 0.62 0.73 0.74 0.71 0.66   GFRESTIMATED >90Non  GFR Calc 83 81 85 >90Non   GFR Calc   GFRESTBLACK >90African American GFR Calc >90African American GFR Calc >90African American GFR Calc >90African American GFR Calc >90African American GFR Calc   VIKTORIYA 7.2* 7.4* 7.5* 7.5* 7.8*   MAG 1.8 2.0 1.7 1.8  --    PROTTOTAL  --   --   --   --  5.4*   ALBUMIN  --   --   --   --  1.8*   BILITOTAL  --   --   --   --  0.7   ALKPHOS  --   --   --   --  180*   AST  --   --   --   --  41   ALT  --   --   --   --  63*     Corrected Ca:~ 9.0 6/22/2017      CBC    Recent Labs  Lab 06/22/17  0653 06/21/17  0623 06/20/17  0709 06/19/17  1612   WBC 2.5* 2.8* 2.8* 3.1*   RBC 2.49* 3.02* 2.73* 2.79*   HGB 7.9* 9.5* 8.7* 9.0*   HCT 23.7* 28.3* 25.7* 26.4*   MCV 95 94 94 95   MCH 31.7 31.5 31.9 32.3   MCHC 33.3 33.6 33.9 34.1   RDW 21.2* 20.9* 21.0* 20.6*   PLT 33* 43* 50* 44*     INR    Recent Labs  Lab 06/22/17  0653 06/21/17  0623 06/20/17  0709 06/19/17  1612   INR 1.33* 1.42* 1.29* 1.44*     Unresulted Labs Ordered in the Past 30 Days of this Admission     Date and Time Order Name Status Description    6/19/2017 0725 Anaerobic bacterial culture Preliminary     6/19/2017 0719 Blood culture Preliminary     6/19/2017 0719 Blood culture Preliminary     5/31/2017 1012 s100 B: Laboratory Miscellaneous Order In process     5/29/2017 1046 Adalimumab Activity and Neutralizing Antibodies: Laboratory Miscellaneous Order In process           Echo: 06/05:     Limited, technically difficult study. Extremely difficult acoustic windows  despite the use of contrast for endcardial border definition.  Borderline (EF 50-55%) reduced left ventricular function is present.  Additionally, the abnormal non-specific septal motion and irregular cardiac  cycle contribute to the difficulty in accurately measuring LVEF.  The IVC is dilated at 2.5 cm, c/w increased RAP. The RAP is in excess of 15  mmHg.    ASSESSMENT & PLAN :    Amelia Michel is a 56 year old woman with hx of VSD repair (1969), Rheumatoid arthritis, and a  recent diagnosis of Afib/Aflutter/SVT, who presented to [facility] on 5/29 after Out of Hospital cardiac arrest with shockable rhythm. After ROSC in the field, she was admitted from 5/29-6/15, extubated 6/4. While at ARU (6/15-6/19), pt developed worsening LUE wound pain and fever, and was admitted back to [facility] for further workup. Admitted 6/19/2017         Changes Today:      - Afib/flutter w RVR: HR: 120-130s. Requested Cardiology formal consultation.   Atenolol 25 mg daily prn for persistent tachy >120  - intermittent sob: monitor. No ivf for now. Consider CXR if persists. Clear on exam.   - L UE wound infection: ID on board. Remains afebrile. Further abx per id. Ct on iv Vanco and Ertapenem.   - Loose stools: Check C.diff pcr. Enteric isolation pending C.diff.   - Dry heaves:06/21: better.               # Sepsis likely from L UE wound infection. On adm: SIRS +  (Fever, leukopenia, tachcyardia)   C.diff neg.   Enteric panel, stool: neg.   UC: mixed urogenital harshal.   WC:   Heavy growth Enterobacter cloacae complex   Moderate growth Enterococcus faecalis   Moderate growth Coagulase negative Staphylococcus   susceptibility reviewed.  BC: no growth so far.     Left UE Wound with concern for cellulitis +/- myositis with potential area of myonecrosis:    L UE wound onset around time of cardiac arrest, unclear etiology (?trauma during code), exacerbated by fluid extravasation from IV during last admission. Over course of rehab, pt has noticed increasing pain in L UE with therapy and spiked fevers to 101F and 102F on 6/18 and 6/19, respectively. On admission, wound appears erythematous, with eschar and fibrinous material overlying wound (no purulent drainage), tender to palpation.    6/19 L Elbow XR:  Negative for osteo, diffuse subcutaneous soft tissue stranding, possibly representative of cellulitis   6/19 L Elbow MRI: Diffuse cellulitis and multicompartment soft tissue tissue edema within the muscles about the left elbow,  with a focal area of nonenhancement,  centered in the brachialis muscle. Differential includes myositis,including infectious or inflammatory etiology. A focal area of myonecrosis is likely within the brachialis muscle. Correlate clinically.    --Started empirically on iv Vancomycin/Ertapenem and added levofloxacin 6/20 for pseudomonal coverage  --General Surgery: 06/19: no surgical indication.   --Pain control: Tramadol 25mg q6h prn, Continue morphine gel around edges of wound for pain   --WOCN on board. Ct wound care.     -- 6/21/2017: ID consulted. Dc iv levaquin.      *Further Per ID  Vanco level high, pharm D aware.     Other issues:       # Out of Hospital Cardiac Arrest and Cardiogenic Shock:   - ICD will be deferred at this time in lieu of LUE extravasation/high risk of infection. 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 Plts noted to decrease from 104 to as low as 15 during recent hospitalization requiring transfusion w/ chronically low in the 's as outpatient.  -- If this is chronic ITP, no need for treatment unless plts drift < 30s  -- Continue to hold RA meds for now  -- Continue Apixaban 2.5 mg BID for anticoagulation. This will need to be held 2 days prior to ICD placement after discharge (this will be deferred given acute infection).      # Atrial fibrillation:   Known Afib w/ RVR from prior hospitalization w/ rates difficult to control despite being on Metoprolol 100 mg XL and Diltiazem. She had 1 DCCV for which she reverted within 24hrs. Sotolol was also attempted but patient did not tolerate the medication due to nausea and vomiting and subsequently failed Metoprolol and Propranolol for the same issue. She was discharged during this  previous hospitalization on Amiodarone and was also started on Apixaban. Patient had no evidence of ventriclar arrhythmia prior to discharge. RFA was discuss but was defered as patient had a UTI at the time with ESBL. Given difficulty to control rates, an CMR was performed to r/o inflammation or scar as cause of arrhythmia. CMR did not show atrial inflammation or abnormalities except for enlargement. 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     - Rhythm has been spontaneously converting from NSR to Afib w/ RVR w/ rates into the 130-140's.   - Continue Atenolol 50 mg daily. Plus 25 mg daily prn for HR>120  - Digoxin discontinued 6/20 for trough of 3.4. Digoxin level today 1.8. Resume at lower dose 125 mcg daily. Cardiology text paged, a/w reply. Fu level.       6/22/2017  Digoxin discontinued per cards. Did get 125 mcg today am.     6/23/2017  Cardiology consultation requested. Fu recs.     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 Now on 10 mg daily, taper to 7.5 mg on 6/29, then to 5 mg daily after 2 weeks if continues to have low disease activity  -- Continue to hold HCQ and MTX until outpatient follow-up  -- Follow-up with [facility] clinic Dr. Conor Cunha in 4-6 weeks after discharge      # Severe Critical Illness Myopathy:   Significant deconditioning w/ median neuropathies most likely localized to the wrists and ulnar neuropathies most likely localized to the elbows secondary to RA.  -- Ongoing extensive PT/OT/SLP     # ESBL UTI: Completed course abx.      # Malnutrition:     -- Nutrition recommendations are to continue Peptamen 1.5 Tube feeds to 40 ml/hr x 11 hours which will provide 440 ml TF, 660 kcals, and 30 g protein daily. TFs may be adjusted pending oral intake.  Once kcal counts reveal that patient is consuming at least 900 kcals and 45 g protein daily on average can discontinue.  -- Water flushes as needed.  -- Nutrition on board.        Consulting Services: Cardiology, Infectious disease. PMR.      CODE: Full  DVT Ppx: Apixaban  Diet/Fluids: Cycled tube feeds, calorie counts,  nutrition following  6/22/2017: per SLP: regular w thin.     - Electrolyte Protocol:  Yes  - Telemetry: Yes; Life Vest needs to be worn at all times    Disposition Plan   Expected discharge in 2-3 days to likely back to ARU once medically stable. fu PMR consult requested as per insurance requirement per CC .     Entered: Johnie Chino 06/23/2017, 1:40 PM        Pt's care was discussed with  RN , SW, CC  D/w Cardiology.        Johnie Chino MD   Hospitalist (Internal Medicine)  Pager: [phone].

## 2017-06-23 NOTE — PHARMACY-VANCOMYCIN DOSING SERVICE
Pharmacy Vancomycin Note  Date of Service 2017  Patient's  1960   56 year old, female    Indication: Skin and Soft Tissue Infection  Goal Trough Level: 15-20 mg/L  Day of Therapy: 5  Current Vancomycin regimen:  1250 mg IV q12h    Current estimated CrCl = Estimated Creatinine Clearance: 130.5 mL/min (based on Cr of 0.62).    Creatinine for last 3 days  2017:  6:23 AM Creatinine 0.73 mg/dL  2017:  6:53 AM Creatinine 0.62 mg/dL    Recent Vancomycin Levels (past 3 days)  2017: 11:32 PM Vancomycin Level 23.3 mg/L  2017: 10:51 AM Vancomycin Level 26.6 mg/L (10 hour level)    Vancomycin IV Administrations (past 72 hours)                   vancomycin (VANCOCIN) 1,250 mg in NaCl 0.9 % 250 mL intermittent infusion (mg) 1,250 mg New Bag 17 0043     1,250 mg New Bag 17 1213     1,250 mg New Bag  0021                Nephrotoxins and other renal medications     None             Contrast Orders - past 72 hours     None          Interpretation of levels and current regimen:  Trough level is  Supratherapeutic. This is a 10 hour level. True 12 hour trough calculated to be 22.2.    Has serum creatinine changed > 50% in last 72 hours: No    Urine output:  unable to determine    Renal Function: Stable    Plan:  1.  Change dose to 1750 mg every 24 hours. Estimated trough level calculated to be 15.5 with this regimen which is in therapeutic range.  2.  Pharmacy will check trough levels as appropriate in 1-3 Days.    3. Serum creatinine levels will be ordered daily for the first week of therapy and at least twice weekly for subsequent weeks.      Blaire Rangel, Pharm.D. Student          .

## 2017-06-23 NOTE — PLAN OF CARE
Problem: Goal Outcome Summary  Goal: Goal Outcome Summary  SLP: Pt presents with mild expressive aphasia in the context of word finding difficulties, difficulty producing definitions, and rigitity in use of language. This impacts her as conversations are hard to follow and she cannot enjoy her favorite hobbies. SLP to f/u for further reading comprehension eval, cognitive communication eval, and to initiate langauge therapy per POC.

## 2017-06-23 NOTE — PLAN OF CARE
"Problem: Goal Outcome Summary  Goal: Goal Outcome Summary  Outcome: No Change  /66 (BP Location: Right arm)  Pulse 101  Temp 95.6  F (35.3  C) (Oral)  Resp 18  Ht 1.48 m (4' 10.25\")  Wt 81.6 kg (179 lb 12.7 oz)  SpO2 100%  BMI 37.25 kg/m2  Alert/ ox4. Contact isolation ESBL.  Full code.  No prn medication requests this shift. Bilateral leg wraps in place. Turned and repositioned q 2 hours as willing as well as bilateral legs elevated on pillows. Blood Glucose 138 at 0201.Generalizes edema throughout. TF infusing at 40cc/hr 6-6.  Right midline  Patent and patient denies pain or sob.  She states left arm less painful and swollen.IV Vanco given at scheduled time.   Pt on jesse counts. On TELE with  Atrial Fib and   Atrial flutter with tachycardia during shift. Pt wearing lifevest with  Battery pack attached and battery charger at bedside.  extravasion from cardiac med and . Pt  Incontinent of bowel and bladder x 2 this shift.  Plan:  Continue to monitor and follow POC.  Notify MD of changes.      "

## 2017-06-23 NOTE — PROGRESS NOTES
Social Work Services Progress Note    Hospital Day: 5  Data:  Amelia is a 56 year old female admitted from  ARU, planning to return to ARU at DC. Anticipating ready to DC over the weekend. Admissions submitted for insurance auth on Thursday.    Intervention:    Pt's  has a lot of FMLA and disability paperwork he wants filled out. Asked Dr. Chino to fill it out and he did not have time Thurs. He asked to put it in the pt's hard chart and he will fill it out on Fri.    Plan:    Anticipated Disposition:  Facility:   [facility]    Barriers to d/c plan:  Medical readiness    Follow Up:  SW paged  [facility]U on Friday re-informing of anticipation of weekend DC. Weekend SW to follow up to arrange transport.    Samia COLLINS, MSW  5B  (Medical/Surgical)  Phone: [phone]  Pager: [phone]

## 2017-06-23 NOTE — CONSULTS
"       Cardiology Inpatient Consultation  June 23, 2017    Reason for Consult:  A cardiology consult was requested by Dr. Chino from the medicine service to provide clinical guidance regarding afib w/RVR.    HPI:   Amelia Michel is a 56 year old woman with hx of VSD repair (1969), Rheumatoid arthritis, and a recent diagnosis of Afib/Aflutter/SVT, who presented to [facility] on 5/29 after Out of Hospital cardiac arrest with shockable rhythm. After ROSC in the field, she was admitted from 5/29-6/15, extubated 6/4. While at ARU (6/15-6/19), pt developed worsening LUE wound pain and fever, and was admitted back to [facility] for further workup. Wound culture on 6/19/17 grew Enterobacter cloacae complex, enterococcus faecalis and coagulase negative staphylococcus. She is well known to EP.     Cardiology is consulted today as the patient developed afib with RVR, rates 120-130 despite Atenolol 50 mg. She did get an additional dose of digoxin 125 mcg yesterday  AM. Her digoxin level 6/20/17 was 3.4, 6/21/17 digoxin was 1.8 after holding suggesting that her digoxin level on the 20th was actually high. Patient is noted to have gross anasarca and her weight is up appx 25 lbs after triggering sepsis protocol with a lactic acid of 3.1. She complains of dyspnea and orthopnea which she attributes to her volume status. She denies chest pain or palpitation, light headedness, syncope. She does complain of feeling \"worn out.\"             Review of Systems:    Complete review of systems was performed and negative except per HPI.    PMH:  Past Medical History:   Diagnosis Date     Hypertension      Rheumatoid arthritis(714.0)      Active Problems:  Patient Active Problem List    Diagnosis Date Noted     Sepsis (H) 06/19/2017     Priority: Medium     Wound of left upper extremity 06/19/2017     Priority: Medium     Critical illness myopathy 06/16/2017     Priority: Medium     Acute respiratory failure with hypoxia (H) 06/09/2017     Priority: " Medium     Hypertension      Priority: Medium     Cardiogenic shock (H) 05/29/2017     Priority: Medium     Atrial tachycardia (H) 05/16/2017     Priority: Medium     Lymphedema of both lower extremities 04/04/2017     Priority: Medium     Other rheumatoid arthritis with rheumatoid factor of multiple sites (H) 05/02/2016     Priority: Medium     Hyperlipidemia LDL goal <130 02/22/2011     HTN (hypertension)      Primary pulmonary hypertension (H)      Seeing [facility].        Social History:  Social History   Substance Use Topics     Smoking status: Never Smoker     Smokeless tobacco: Never Used     Alcohol use Yes      Comment: Glass of wine two evenings a night     Family History:  Family History   Problem Relation Age of Onset     Breast Cancer Mother      Hypertension Mother      Alzheimer Disease Mother      Hypertension Father      Blood Disease Father      Lymphoa     Circulatory Father      A Fib     DIABETES Paternal Grandmother        Medications:    vancomycin (VANCOCIN) IV  1,750 mg Intravenous Q24H     pantoprazole  40 mg Oral QAM     sodium chloride (PF)  10 mL Intracatheter Q8H     heparin lock flush  5-10 mL Intracatheter Q24H     apixaban ANTICOAGULANT  2.5 mg Oral BID     ascorbic acid  500 mg Oral Daily     atenolol  50 mg Oral Daily     beta carotene  25,000 Units Oral Daily     cetirizine  10 mg Oral Daily     ertapenem  1 g Intravenous Q24H     folic acid  1 mg Oral Daily     gabapentin  200 mg Oral TID     morphine 0.1% in solosite  2 g Transdermal TID     multivitamin, therapeutic with minerals  1 tablet Oral Daily     triamcinolone   Topical BID     zinc sulfate  220 mg Oral Daily     sodium chloride (PF)  3 mL Intracatheter Q8H     insulin aspart  1-7 Units Subcutaneous TID AC     insulin aspart  1-5 Units Subcutaneous At Bedtime     calcium-vitamin D  2 tablet Oral Daily     co-enzyme Q-10  30 mg Oral Daily     melatonin  1 mg Oral At Bedtime     predniSONE  10 mg Oral Daily         IV  fluid REPLACEMENT ONLY       - MEDICATION INSTRUCTIONS -       - MEDICATION INSTRUCTIONS -         Physical Exam:  Temp:  [95.3  F (35.2  C)-97.7  F (36.5  C)] 97.2  F (36.2  C)  Pulse:  [101] 101  Heart Rate:  [] 120  Resp:  [16-20] 16  BP: ()/(31-66) 89/31  SpO2:  [98 %-100 %] 98 %    Intake/Output Summary (Last 24 hours) at 06/23/17 1334  Last data filed at 06/23/17 0632   Gross per 24 hour   Intake             1170 ml   Output                0 ml   Net             1170 ml     GEN: pleasant, no acute distress, gross anasarca  HEENT: no icterus  CV: Irregularly irregular, tachycardic, no murmurs/rubs/s3/s4, no heave.   CHEST: clear to ausculation bilaterally, crackles in bases  ABD: soft, non-tender, normal active bowel sounds  EXTR: Lymphedema wrappings. No clubbing, cyanosis or edema.   NEURO: alert oriented, speech fluent/appropriate, motor grossly nonfocal    Diagnostics:  All labs and imaging were reviewed, of note:    All laboratory data reviewed    Lab Results   Component Value Date    TROPI 0.381 () 05/30/2017    TROPI 0.457 () 05/30/2017    TROPI 0.569 () 05/30/2017    TROPI 0.912 () 05/29/2017    TROPI 2.028 () 05/29/2017    TROPONIN 0.19 () 05/29/2017       EKG:      Transthoracic echocardiogram 6/5/17:    Limited, technically difficult study. Extremely difficult acoustic windows  despite the use of contrast for endcardial border definition.  Borderline (EF 50-55%) reduced left ventricular function is present.  Additionally, the abnormal non-specific septal motion and irregular cardiac  cycle contribute to the difficulty in accurately measuring LVEF.  The IVC is dilated at 2.5 cm, c/w increased RAP. The RAP is in excess of 15  MmHg.    Cardiac MRI   1.  Normal left ventricular size and low-normal systolic function with  a calculated ejection fraction of  55 %.  2.  Mildly dilated right ventricular size and normal right ventricular  systolic function with a calculated ejection  fraction of 51%. There is  a focal area of dyskinesis in the right ventricular outflow tract, the  etiology of which is unclear. If the patient's clinical history is  compatible, this could represent the site of prior surgical  modification of the RVOT.   3.  Normal variant pulmonary venous drainage into the left atrium with  a common origin of the left pulmonary veins and measurements as  described below.   4.  Severe biatrial enlargement.  5.  Severe eccentric tricuspid regurgitation, the mechanism of which  is not clearly demonstrated.  6.  Mild to moderate mitral regurgitation.  7.  There are no intracardiac thrombi; specifically, the left atrial  appendage is free of thrombus.  8.  On delayed enhancement imaging, there is hyperenhancement at the  RV septal insertion site. This is a nonspecific finding which can be  seen in the setting of diseases involving the right ventricle.    Coronary Angio 5/29/17    Assessment and Recommendation:  Amelia Michel is a 56 year old woman with hx of VSD repair (1969), Rheumatoid arthritis, and a recent diagnosis of Afib/Aflutter/SVT, who presented to [facility] on 5/29 after Out of Hospital cardiac arrest with shockable rhythm. After ROSC in the field, she was admitted from 5/29-6/15, extubated 6/4. While at ARU (6/15-6/19), pt developed worsening LUE wound pain and fever, and was admitted back to [facility] for further workup. Wound culture on 6/19/17 grew Enterobacter cloacae complex, enterococcus faecalis and coagulase negative staphylococcus. She is well known to EP.       1. Afib with RVR - The patient has multiple etiologies for developing afib (and multiple modalities have been tried for rate control and rhythm control including metoprlol, diltiazem, sotalol, propanolol, DCCV). Her threshold for going into afib is probably quite low as she has severe biatrial enlargement. However, it is a secondary issue; secondary to her infectious course and sepsis.     At present, she has  been tolerating the Atenolol 50 mg qd with PRN 25 mg for rates consistently >120 bpm. With her RVR today. She does have gross anasarca on exam and if this is intravascular it may also be contributing to her RVR.    -Digoxin levels were elevated and given it has significant risk, we would D/C Digoxin and use it only if necessary  -Would recommend clarification of volume status with a limited ECHO to see the IVC. If dilated and without normal respiratory variation, would diurese.  -Continue atenolol 50 mg qd PO and Atenolol 25 mg PRN rates consistently >120-130   -Patient likely has afib with RVR secondary to increased catecholamine surge in the setting of infection and sepsis   -In this setting, it is permissible for her to have RVR as it is physiologic and aggressive rate control may do more harm than good as she has been difficult to rate control in the past with significant adverse drug reaction  -Consider formal EP consult if rates are consistently >120-130 or if patient is symptomatic           I have discussed the above with Dr. Dickson.    Thank you for consulting the cardiovascular services at the [facility]. Please do not hesitate to call us with any questions.     Darnell Marie PA-C  [facility] Cardiology Consult Team  Pager [phone] or [phone]    I have seen and examined the patient today as part of a shared visit with Andrei Marie PA-C. I reviewed today's ECG/Tele and imaging. On exam today I found her in atrial fibrillation with evidence of volume overlaod. Decision(s) made by me today include heart rate goals and assessment of volume status. She has elevated lactic acid levels which continue to trigger a sepsis alert. However, she has necrotic tissue in her upper arm as well. By my exam she is hypervolemic.     NB - she can remove the LifeVest for an echocardiogram.    The above was discussed with team to complete.     Shashi Dickson MD

## 2017-06-24 ENCOUNTER — APPOINTMENT (OUTPATIENT)
Dept: CARDIOLOGY | Facility: CLINIC | Age: 57
DRG: 871 | End: 2017-06-24
Attending: INTERNAL MEDICINE
Payer: COMMERCIAL

## 2017-06-24 ENCOUNTER — APPOINTMENT (OUTPATIENT)
Dept: OCCUPATIONAL THERAPY | Facility: CLINIC | Age: 57
DRG: 871 | End: 2017-06-24
Attending: INTERNAL MEDICINE
Payer: COMMERCIAL

## 2017-06-24 LAB
ANION GAP SERPL CALCULATED.3IONS-SCNC: 8 MMOL/L (ref 3–14)
BUN SERPL-MCNC: 47 MG/DL (ref 7–30)
C DIFF TOX B STL QL: NORMAL
CALCIUM SERPL-MCNC: 7.1 MG/DL (ref 8.5–10.1)
CHLORIDE SERPL-SCNC: 106 MMOL/L (ref 94–109)
CO2 SERPL-SCNC: 18 MMOL/L (ref 20–32)
CREAT SERPL-MCNC: 0.85 MG/DL (ref 0.52–1.04)
ERYTHROCYTE [DISTWIDTH] IN BLOOD BY AUTOMATED COUNT: 20.4 % (ref 10–15)
GFR SERPL CREATININE-BSD FRML MDRD: 69 ML/MIN/1.7M2
GLUCOSE BLDC GLUCOMTR-MCNC: 143 MG/DL (ref 70–99)
GLUCOSE BLDC GLUCOMTR-MCNC: 149 MG/DL (ref 70–99)
GLUCOSE BLDC GLUCOMTR-MCNC: 155 MG/DL (ref 70–99)
GLUCOSE BLDC GLUCOMTR-MCNC: 166 MG/DL (ref 70–99)
GLUCOSE BLDC GLUCOMTR-MCNC: 174 MG/DL (ref 70–99)
GLUCOSE SERPL-MCNC: 151 MG/DL (ref 70–99)
HCT VFR BLD AUTO: 24.3 % (ref 35–47)
HGB BLD-MCNC: 8.4 G/DL (ref 11.7–15.7)
LACTATE BLD-SCNC: 2.2 MMOL/L (ref 0.7–2.1)
LACTATE BLD-SCNC: 2.4 MMOL/L (ref 0.7–2.1)
MAGNESIUM SERPL-MCNC: 1.9 MG/DL (ref 1.6–2.3)
MAGNESIUM SERPL-MCNC: 1.9 MG/DL (ref 1.6–2.3)
MCH RBC QN AUTO: 32.6 PG (ref 26.5–33)
MCHC RBC AUTO-ENTMCNC: 34.6 G/DL (ref 31.5–36.5)
MCV RBC AUTO: 94 FL (ref 78–100)
NT-PROBNP SERPL-MCNC: 1761 PG/ML (ref 0–900)
PHOSPHATE SERPL-MCNC: 3.4 MG/DL (ref 2.5–4.5)
PLATELET # BLD AUTO: 21 10E9/L (ref 150–450)
POTASSIUM SERPL-SCNC: 4.9 MMOL/L (ref 3.4–5.3)
POTASSIUM SERPL-SCNC: 4.9 MMOL/L (ref 3.4–5.3)
RBC # BLD AUTO: 2.58 10E12/L (ref 3.8–5.2)
SODIUM SERPL-SCNC: 132 MMOL/L (ref 133–144)
SPECIMEN SOURCE: NORMAL
TROPONIN I SERPL-MCNC: 0.02 UG/L (ref 0–0.04)
WBC # BLD AUTO: 4 10E9/L (ref 4–11)

## 2017-06-24 PROCEDURE — 25000128 H RX IP 250 OP 636: Performed by: PHYSICIAN ASSISTANT

## 2017-06-24 PROCEDURE — 80048 BASIC METABOLIC PNL TOTAL CA: CPT | Performed by: PHYSICIAN ASSISTANT

## 2017-06-24 PROCEDURE — 84100 ASSAY OF PHOSPHORUS: CPT | Performed by: PHYSICIAN ASSISTANT

## 2017-06-24 PROCEDURE — 36592 COLLECT BLOOD FROM PICC: CPT | Performed by: INTERNAL MEDICINE

## 2017-06-24 PROCEDURE — 21400006 ZZH R&B CCU INTERMEDIATE UMMC

## 2017-06-24 PROCEDURE — 40000264 ECHO LIMITED WITH OPTISON

## 2017-06-24 PROCEDURE — 84484 ASSAY OF TROPONIN QUANT: CPT | Performed by: PHYSICIAN ASSISTANT

## 2017-06-24 PROCEDURE — 85027 COMPLETE CBC AUTOMATED: CPT | Performed by: PHYSICIAN ASSISTANT

## 2017-06-24 PROCEDURE — 25500064 ZZH RX 255 OP 636: Performed by: INTERNAL MEDICINE

## 2017-06-24 PROCEDURE — 25000132 ZZH RX MED GY IP 250 OP 250 PS 637

## 2017-06-24 PROCEDURE — 36592 COLLECT BLOOD FROM PICC: CPT | Performed by: PHYSICIAN ASSISTANT

## 2017-06-24 PROCEDURE — 25000132 ZZH RX MED GY IP 250 OP 250 PS 637: Performed by: STUDENT IN AN ORGANIZED HEALTH CARE EDUCATION/TRAINING PROGRAM

## 2017-06-24 PROCEDURE — 25000125 ZZHC RX 250: Performed by: INTERNAL MEDICINE

## 2017-06-24 PROCEDURE — 83735 ASSAY OF MAGNESIUM: CPT | Performed by: INTERNAL MEDICINE

## 2017-06-24 PROCEDURE — P9047 ALBUMIN (HUMAN), 25%, 50ML: HCPCS | Performed by: PHYSICIAN ASSISTANT

## 2017-06-24 PROCEDURE — 93325 DOPPLER ECHO COLOR FLOW MAPG: CPT | Mod: 26 | Performed by: INTERNAL MEDICINE

## 2017-06-24 PROCEDURE — 40000133 ZZH STATISTIC OT WARD VISIT

## 2017-06-24 PROCEDURE — 84132 ASSAY OF SERUM POTASSIUM: CPT | Performed by: INTERNAL MEDICINE

## 2017-06-24 PROCEDURE — 93321 DOPPLER ECHO F-UP/LMTD STD: CPT | Mod: 26 | Performed by: INTERNAL MEDICINE

## 2017-06-24 PROCEDURE — 00000146 ZZHCL STATISTIC GLUCOSE BY METER IP

## 2017-06-24 PROCEDURE — 97535 SELF CARE MNGMENT TRAINING: CPT | Mod: GO

## 2017-06-24 PROCEDURE — 83735 ASSAY OF MAGNESIUM: CPT | Performed by: PHYSICIAN ASSISTANT

## 2017-06-24 PROCEDURE — 25000128 H RX IP 250 OP 636: Performed by: STUDENT IN AN ORGANIZED HEALTH CARE EDUCATION/TRAINING PROGRAM

## 2017-06-24 PROCEDURE — 93308 TTE F-UP OR LMTD: CPT | Mod: 26 | Performed by: INTERNAL MEDICINE

## 2017-06-24 PROCEDURE — 83880 ASSAY OF NATRIURETIC PEPTIDE: CPT | Performed by: PHYSICIAN ASSISTANT

## 2017-06-24 PROCEDURE — 93010 ELECTROCARDIOGRAM REPORT: CPT | Performed by: INTERNAL MEDICINE

## 2017-06-24 PROCEDURE — 93308 TTE F-UP OR LMTD: CPT

## 2017-06-24 PROCEDURE — 83605 ASSAY OF LACTIC ACID: CPT | Performed by: INTERNAL MEDICINE

## 2017-06-24 PROCEDURE — 25000128 H RX IP 250 OP 636: Performed by: INTERNAL MEDICINE

## 2017-06-24 PROCEDURE — 25000132 ZZH RX MED GY IP 250 OP 250 PS 637: Performed by: INTERNAL MEDICINE

## 2017-06-24 PROCEDURE — P9047 ALBUMIN (HUMAN), 25%, 50ML: HCPCS | Performed by: INTERNAL MEDICINE

## 2017-06-24 PROCEDURE — 93005 ELECTROCARDIOGRAM TRACING: CPT

## 2017-06-24 PROCEDURE — 27210436 ZZH NUTRITION PRODUCT SEMIELEM INTERMED CAN

## 2017-06-24 PROCEDURE — 99233 SBSQ HOSP IP/OBS HIGH 50: CPT | Performed by: INTERNAL MEDICINE

## 2017-06-24 PROCEDURE — 87493 C DIFF AMPLIFIED PROBE: CPT | Performed by: INTERNAL MEDICINE

## 2017-06-24 RX ORDER — ALBUMIN (HUMAN) 12.5 G/50ML
12.5 SOLUTION INTRAVENOUS ONCE
Status: COMPLETED | OUTPATIENT
Start: 2017-06-24 | End: 2017-06-24

## 2017-06-24 RX ORDER — ONDANSETRON 4 MG/1
4 TABLET, FILM COATED ORAL EVERY 6 HOURS PRN
Status: DISCONTINUED | OUTPATIENT
Start: 2017-06-24 | End: 2017-08-04 | Stop reason: HOSPADM

## 2017-06-24 RX ORDER — FUROSEMIDE 20 MG
20 TABLET ORAL ONCE
Status: COMPLETED | OUTPATIENT
Start: 2017-06-24 | End: 2017-06-24

## 2017-06-24 RX ORDER — ALBUMIN (HUMAN) 12.5 G/50ML
12.5 SOLUTION INTRAVENOUS ONCE
Status: DISCONTINUED | OUTPATIENT
Start: 2017-06-24 | End: 2017-06-24

## 2017-06-24 RX ADMIN — PANTOPRAZOLE SODIUM 40 MG: 40 TABLET, DELAYED RELEASE ORAL at 08:42

## 2017-06-24 RX ADMIN — Medication 25000 UNITS: at 08:42

## 2017-06-24 RX ADMIN — Medication 2 G: at 22:40

## 2017-06-24 RX ADMIN — CETIRIZINE HYDROCHLORIDE 10 MG: 10 TABLET, FILM COATED ORAL at 08:40

## 2017-06-24 RX ADMIN — OXYCODONE HYDROCHLORIDE AND ACETAMINOPHEN 500 MG: 500 TABLET ORAL at 08:40

## 2017-06-24 RX ADMIN — ATENOLOL 50 MG: 50 TABLET ORAL at 08:43

## 2017-06-24 RX ADMIN — GABAPENTIN 200 MG: 100 CAPSULE ORAL at 08:40

## 2017-06-24 RX ADMIN — GABAPENTIN 200 MG: 100 CAPSULE ORAL at 14:02

## 2017-06-24 RX ADMIN — ATENOLOL 25 MG: 25 TABLET ORAL at 05:26

## 2017-06-24 RX ADMIN — SODIUM CHLORIDE 250 ML: 9 INJECTION, SOLUTION INTRAVENOUS at 16:32

## 2017-06-24 RX ADMIN — MULTIPLE VITAMINS W/ MINERALS TAB 1 TABLET: TAB at 08:41

## 2017-06-24 RX ADMIN — CALCIUM CARBONATE 600 MG (1,500 MG)-VITAMIN D3 400 UNIT TABLET 2 TABLET: at 08:40

## 2017-06-24 RX ADMIN — TRIAMCINOLONE ACETONIDE: 1 OINTMENT TOPICAL at 22:40

## 2017-06-24 RX ADMIN — INSULIN ASPART 1 UNITS: 100 INJECTION, SOLUTION INTRAVENOUS; SUBCUTANEOUS at 20:47

## 2017-06-24 RX ADMIN — SODIUM CHLORIDE, PRESERVATIVE FREE 5 ML: 5 INJECTION INTRAVENOUS at 14:03

## 2017-06-24 RX ADMIN — ONDANSETRON HYDROCHLORIDE 4 MG: 4 TABLET, FILM COATED ORAL at 05:17

## 2017-06-24 RX ADMIN — Medication 30 MG: at 08:41

## 2017-06-24 RX ADMIN — APIXABAN 2.5 MG: 2.5 TABLET, FILM COATED ORAL at 20:53

## 2017-06-24 RX ADMIN — PREDNISONE 10 MG: 10 TABLET ORAL at 08:43

## 2017-06-24 RX ADMIN — SODIUM CHLORIDE, PRESERVATIVE FREE 5 ML: 5 INJECTION INTRAVENOUS at 14:58

## 2017-06-24 RX ADMIN — FOLIC ACID 1 MG: 1 TABLET ORAL at 08:42

## 2017-06-24 RX ADMIN — Medication 220 MG: at 08:41

## 2017-06-24 RX ADMIN — GABAPENTIN 200 MG: 100 CAPSULE ORAL at 20:53

## 2017-06-24 RX ADMIN — SODIUM CHLORIDE, PRESERVATIVE FREE 5 ML: 5 INJECTION INTRAVENOUS at 22:24

## 2017-06-24 RX ADMIN — ALBUMIN (HUMAN) 12.5 G: 12.5 SOLUTION INTRAVENOUS at 18:38

## 2017-06-24 RX ADMIN — ERTAPENEM SODIUM 1 G: 1 INJECTION, POWDER, LYOPHILIZED, FOR SOLUTION INTRAMUSCULAR; INTRAVENOUS at 13:05

## 2017-06-24 RX ADMIN — HUMAN ALBUMIN MICROSPHERES AND PERFLUTREN 6 ML: 10; .22 INJECTION, SOLUTION INTRAVENOUS at 09:00

## 2017-06-24 RX ADMIN — ALBUMIN (HUMAN) 12.5 G: 12.5 SOLUTION INTRAVENOUS at 23:12

## 2017-06-24 RX ADMIN — FUROSEMIDE 20 MG: 20 TABLET ORAL at 13:04

## 2017-06-24 RX ADMIN — TRIAMCINOLONE ACETONIDE: 1 OINTMENT TOPICAL at 08:40

## 2017-06-24 RX ADMIN — APIXABAN 2.5 MG: 2.5 TABLET, FILM COATED ORAL at 08:42

## 2017-06-24 NOTE — PROGRESS NOTES
Pt a/o x 3. VSS except HR 120s-130s, 25 mg daily PRN atenolol given. Pt denies shortness of breath or chest pain and states her breathing is easier this evening which she attributes to less fluid overload. Dressing to LUE changed and morphine gel applied. Incontenent of urine and bowel, stool was smear size so unable to collect c diff. Notified gold of lactic acid 2.1, no new orders received. Will continue to monitor pt status.

## 2017-06-24 NOTE — PLAN OF CARE
Problem: Goal Outcome Summary  Goal: Goal Outcome Summary  Outcome: No Change  D; VSS, Afeb. See Epic for full assessment. Pt A&O x 4. Denies any pain through shift. Was to change services yesterday, but should have remained on 6C with having the external defibrulator as this floor has not been trained on this type of equipment. Nursing supervisor talked with, agreed that Pt should be transferred back to 6C. Arrangements made. Pt sent around 1415. Report given earlier.  A/P: Taken by transport around 1415.     Zuleima Hernandez R.N.

## 2017-06-24 NOTE — PLAN OF CARE
"Problem: Goal Outcome Summary  Goal: Goal Outcome Summary  Outcome: No Change  BP 90/44 (BP Location: Right arm)  Pulse 123  Temp 97.1  F (36.2  C) (Oral)  Resp 18  Ht 1.48 m (4' 10.25\")  Wt 81.6 kg (179 lb 12.7 oz)  SpO2 98%  BMI 37.25 kg/m2VSS except HR tacky. PRN atenolol given x1. TELE:  Tachy with A Fib.   Alert/ ox3.Pt endorses less sob with less fluid overload.  Pt wearing [employer] Life vest.   Pt requested prn zofran and MD notified for PO. Pt turned and repositioned, bilateral l/e with lymphedema wraps.  Pt reports left UE arm pain less and elevated limb. LUE dressing intact. Pt had one episode of stool/urine incontinence.   Stool specimen for cdif sent to lab.  Pt endorses frustration with word finding difficulties. Pt TF infused at goal rate of 40cc/hr from 6pm -6am.  Right midline tko.  Dressing to LUE changed  Will continue to monitor pt status. Plan:  Continue to monitor and follow POC.  Notify MD of changes.       "

## 2017-06-24 NOTE — PROGRESS NOTES
Addendum:     Received page about lactic acid: 2.4  Bp low patient asymptomatic  L UE cellulitis: improving, patient already on antibiotics.fu  Could be hypovolemia 2/2 poor po intake. However patient also with anasarca with significant wt gain.   Limited echo: IVC prob normal  Will try 250 ml nss bolus with 12.5 gm albumin 25%  Fu lactic acid    Johnie Chino MD   Hospitalist (Internal Medicine)  Pager: [phone].

## 2017-06-24 NOTE — PLAN OF CARE
Problem: Goal Outcome Summary  Goal: Goal Outcome Summary  5B PT: Pt transferred floors to unit PT not trained in on. Cx and rescheduled appt with appropriate floor.

## 2017-06-24 NOTE — PLAN OF CARE
Problem: Goal Outcome Summary  Goal: Goal Outcome Summary  SLP: Dysphagia goals met.  Speech language treatment cancelled as pt transferring units at the time of session attempt.  ST to f/u with language intervention as noted on POC.

## 2017-06-24 NOTE — PLAN OF CARE
Problem: Goal Outcome Summary  Goal: Goal Outcome Summary  OT/5B: Pt reports L UE less swollen today. Performed theraputty, foam block (yellow) and hand/finger ROM exercises following handouts. Pt able to perform more reps than in previous session with rest breaks throughout. Discussed alternate activities to replicate exercises with use of common household items as well as how to grade exercises. Pt put forth good effort and motivation for therapy.         Per plan established by the OT, the recommendation for dc location is ARU

## 2017-06-24 NOTE — PROGRESS NOTES
[facility], [place]   Hospitalist Daily Progress Note                                                 Date of Admission:2017  ___________________________________  INTERVAL HISTORY (24 Hrs)/SUBJECTIVE:   Last 24 hr events, care team notes reviewed.     Overall stable.   Reports orthopnea and sob worse with any activity.   AVR: mostly 110s.   Nausea ON, better this am.   Feels wiped out.     No CP.   No fever or chills  Pain controlled.     ROS: 4 point ROS neg other than the symptoms noted above in the interval history.    OBJECTIVE :   VITALS:    Temp:  [96.2  F (35.7  C)-98.3  F (36.8  C)] 97.1  F (36.2  C)  Pulse:  [120-124] 123  Heart Rate:  [106-121] 121  Resp:  [18] 18  BP: (90-98)/(40-65) 90/44  SpO2:  [98 %-99 %] 98 %    Temp (24hrs), Av.3  F (36.3  C), Min:96.2  F (35.7  C), Max:98.3  F (36.8  C)    Wt Readings from Last 5 Encounters:   17 81.6 kg (179 lb 12.7 oz)   17 75.7 kg (166 lb 12.8 oz)   17 72.8 kg (160 lb 8 oz)   17 64.4 kg (141 lb 15.6 oz)   17 61.6 kg (135 lb 12.8 oz)          PHYSICAL EXAM:  General: alert, interactive, NAD, Nasal feeding tube.   HEENT: AT/NC,  Moist MM  Neck: Supple,  Respi/Chest: Non labored. Clear BL anterior, poor effort.    CVS/Heart: S1S2 irregular. Tachy. Life vest+  bl pedal edema on ace wraps.   Gi/Abd: Soft, non tender, non distended,   MSK/Ext: distally warm, well perfused.  L UE wound: cellulitis: improving.   Neuro: AO x 4,      Medications:   I have reviewed this patient's current medications.    Data:   All laboratory and imaging data in the past 24 hours reviewed:    LABS:  CMP    Recent Labs  Lab 17  0623 17  1358 17  0653 17  0623 17  0709  17  0608   NA  --  131* 135 133 136  < > 135   POTASSIUM 4.9 5.4* 4.0 4.6 4.6  < > 4.6   CHLORIDE  --  106 107 105 105  < > 104   CO2  --  20 20 19* 24  < > 21   ANIONGAP  --  5 8 9 7  < > 10   GLC  --  154* 152* 179* 79   < > 159*   BUN  --  39* 34* 39* 32*  < > 31*   CR  --  0.78 0.62 0.73 0.74  < > 0.66   GFRESTIMATED  --  76 >90Non  GFR Calc 83 81  < > >90Non  GFR Calc   GFRESTBLACK  --  >90African American GFR Calc >90African American GFR Calc >90African American GFR Calc >90African American GFR Calc  < > >90African American GFR Calc   VIKTORIYA  --  7.2* 7.2* 7.4* 7.5*  < > 7.8*   MAG 1.9  --  1.8 2.0 1.7  < >  --    PROTTOTAL  --   --   --   --   --   --  5.4*   ALBUMIN  --   --   --   --   --   --  1.8*   BILITOTAL  --   --   --   --   --   --  0.7   ALKPHOS  --   --   --   --   --   --  180*   AST  --   --   --   --   --   --  41   ALT  --   --   --   --   --   --  63*   < > = values in this interval not displayed.  Corrected Ca:~ 9.0 6/22/2017      CBC    Recent Labs  Lab 06/23/17  1358 06/22/17  0653 06/21/17  0623 06/20/17  0709   WBC 3.4* 2.5* 2.8* 2.8*   RBC 2.62* 2.49* 3.02* 2.73*   HGB 8.1* 7.9* 9.5* 8.7*   HCT 24.8* 23.7* 28.3* 25.7*   MCV 95 95 94 94   MCH 30.9 31.7 31.5 31.9   MCHC 32.7 33.3 33.6 33.9   RDW 20.8* 21.2* 20.9* 21.0*   PLT 27* 33* 43* 50*     INR    Recent Labs  Lab 06/22/17  0653 06/21/17  0623 06/20/17  0709 06/19/17  1612   INR 1.33* 1.42* 1.29* 1.44*     Unresulted Labs Ordered in the Past 30 Days of this Admission     Date and Time Order Name Status Description    6/19/2017 0725 Anaerobic bacterial culture Preliminary     6/19/2017 0719 Blood culture Preliminary     6/19/2017 0719 Blood culture Preliminary     5/31/2017 1012 s100 B: Laboratory Miscellaneous Order In process     5/29/2017 1046 Adalimumab Activity and Neutralizing Antibodies: Laboratory Miscellaneous Order In process           Echo: 06/05:     Limited, technically difficult study. Extremely difficult acoustic windows  despite the use of contrast for endcardial border definition.  Borderline (EF 50-55%) reduced left ventricular function is present.  Additionally, the abnormal non-specific septal motion and  irregular cardiac  cycle contribute to the difficulty in accurately measuring LVEF.  The IVC is dilated at 2.5 cm, c/w increased RAP. The RAP is in excess of 15  mmHg.    ASSESSMENT & PLAN :    Amelia Michel is a 56 year old woman with hx of VSD repair (1969), Rheumatoid arthritis, and a recent diagnosis of Afib/Aflutter/SVT, who presented to [facility] on 5/29 after Out of Hospital cardiac arrest with shockable rhythm. After ROSC in the field, she was admitted from 5/29-6/15, extubated 6/4. While at ARU (6/15-6/19), pt developed worsening LUE wound pain and fever, and was admitted back to [facility] for further workup. Admitted 6/19/2017         Changes Today:    - Afib/flutter w RVR: HR: 110s today. Appreciate Cardiology input. Continue current atenolol 50 plus 25 daily prn for >120 persistent.   - Intermittent sob w/ Orthopnea, Wt trending up: Echo limited: pending. Try lasix 20 mg po x 1.    - L UE wound infection: ID on board. Clinically Remains afebrile. Cellulitis improving. Dc iv Vanco per id. Continue iv Ertapenem.  Repeat C.diff neg.      - PT/OT: activity as tolerated.                # Sepsis likely from L UE wound infection. On adm: SIRS +  (Fever, leukopenia, tachcyardia)   - Hemodynamics stable.   Improving.   Lactic acidosis: resolved w/ ivf    C.diff neg x 2   Enteric panel, stool: neg.   UC: mixed urogenital harshal.   WC:   Heavy growth Enterobacter cloacae complex   Moderate growth Enterococcus faecalis   Moderate growth Coagulase negative Staphylococcus   susceptibility reviewed.  BC: no growth so far.     Left UE Wound with concern for cellulitis +/- myositis with potential area of myonecrosis:    L UE wound onset around time of cardiac arrest, unclear etiology (?trauma during code), exacerbated by fluid extravasation from IV during last admission. Over course of rehab, pt has noticed increasing pain in L UE with therapy and spiked fevers to 101F and 102F on 6/18 and 6/19, respectively. On admission, wound  appears erythematous, with eschar and fibrinous material overlying wound (no purulent drainage), tender to palpation.    6/19 L Elbow XR:  Negative for osteo, diffuse subcutaneous soft tissue stranding, possibly representative of cellulitis   6/19 L Elbow MRI: Diffuse cellulitis and multicompartment soft tissue tissue edema within the muscles about the left elbow, with a focal area of nonenhancement,  centered in the brachialis muscle. Differential includes myositis,including infectious or inflammatory etiology. A focal area of myonecrosis is likely within the brachialis muscle. Correlate clinically.    --Started empirically on iv Vancomycin/Ertapenem and added levofloxacin 6/20 for pseudomonal coverage  --General Surgery: 06/19: no surgical indication.   --Pain control: Tramadol 25mg q6h prn, Continue morphine gel around edges of wound for pain   --WOCN on board. Ct wound care.     -- 6/21/2017: ID consulted. Dc iv levaquin.    -- 6/24/2017: DC iv Vanco    *Further Per ID      # Atrial fibrillation with RVR:      Known Afib w/ RVR from prior hospitalization w/ rates difficult to control despite being on Metoprolol 100 mg XL and Diltiazem. She had 1 DCCV for which she reverted within 24hrs. Sotolol was also attempted but patient did not tolerate the medication due to nausea and vomiting and subsequently failed Metoprolol and Propranolol for the same issue. She was discharged during this previous hospitalization on Amiodarone and was also started on Apixaban. Patient had no evidence of ventriclar arrhythmia prior to discharge. RFA was discuss but was defered as patient had a UTI at the time with ESBL. Given difficulty to control rates, an CMR was performed to r/o inflammation or scar as cause of arrhythmia. CMR did not show atrial inflammation or abnormalities except for enlargement. 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   Appreciate cardiology consult.   Dc Digoxin per  cardiology. Level high 06/20: 3.4.     - Continue Atenolol 50 mg daily. Plus 25 mg daily prn for HR>120  - fu K, mg,   - Optimize vol status as able. Lasix 20 mg po x 1 6/24/2017  - Limited Echo for IVC _ read pending.   - Consider formal EP consult if rates are consistently >120-130 or if patient is symptomatic     Other issues:       # Out of Hospital Cardiac Arrest and Cardiogenic Shock:   - ICD will be deferred at this time in lieu of LUE extravasation/high risk of infection. 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 Plts noted to decrease from 104 to as low as 15 during recent hospitalization requiring transfusion w/ chronically low in the 's as outpatient.  -- If this is chronic ITP, no need for treatment unless plts drift < 30s  -- Continue to hold RA meds for now  -- Continue Apixaban 2.5 mg BID for anticoagulation. This will need to be held 2 days prior to ICD placement after discharge (this will be deferred given acute infection).        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 Now on 10 mg daily, taper to 7.5 mg on 6/29, then to 5 mg daily after 2 weeks if continues to have low disease activity  -- Continue to hold HCQ and MTX until outpatient follow-up  -- Follow-up with [facility] clinic Dr. Conor Cunha in 4-6 weeks after discharge      # Severe Critical Illness Myopathy:   Significant deconditioning w/ median neuropathies most likely localized to the wrists and ulnar neuropathies most likely localized to the elbows secondary to RA.  --  Ongoing extensive PT/OT/SLP     # ESBL UTI: Completed course abx.      # Malnutrition:     -- Nutrition recommendations are to continue Peptamen 1.5 Tube feeds to 40 ml/hr x 11 hours which will provide 440 ml TF, 660 kcals, and 30 g protein daily. TFs may be adjusted pending oral intake. Once kcal counts reveal that patient is consuming at least 900 kcals and 45 g protein daily on average can discontinue.  -- Water flushes as needed.  -- Nutrition on board.      Consulting Services: Cardiology, Infectious disease. PMR.      CODE: Full  DVT Ppx: Apixaban  Diet/Fluids: Cycled tube feeds, calorie counts,  nutrition following  6/22/2017: per SLP: regular w thin.     - Electrolyte Protocol:  Yes  - Telemetry: Yes; Life Vest needs to be worn at all times    Disposition Plan   Expected discharge in 2-3 days to likely back to ARU once medically stable.      Entered: Johnie Chino 06/24/2017, 12:55 PM      Transfer to  for cardiac tele once bed available given her complicated cardiac history.      Pt's care was discussed with patient, RN.        Johnie Chino MD   Hospitalist (Internal Medicine)  Pager: [phone].

## 2017-06-25 ENCOUNTER — APPOINTMENT (OUTPATIENT)
Dept: PHYSICAL THERAPY | Facility: CLINIC | Age: 57
DRG: 871 | End: 2017-06-25
Attending: INTERNAL MEDICINE
Payer: COMMERCIAL

## 2017-06-25 ENCOUNTER — APPOINTMENT (OUTPATIENT)
Dept: OCCUPATIONAL THERAPY | Facility: CLINIC | Age: 57
DRG: 871 | End: 2017-06-25
Attending: INTERNAL MEDICINE
Payer: COMMERCIAL

## 2017-06-25 LAB
ANION GAP SERPL CALCULATED.3IONS-SCNC: 10 MMOL/L (ref 3–14)
BACTERIA SPEC CULT: NO GROWTH
BACTERIA SPEC CULT: NO GROWTH
BUN SERPL-MCNC: 47 MG/DL (ref 7–30)
CALCIUM SERPL-MCNC: 7.3 MG/DL (ref 8.5–10.1)
CHLORIDE SERPL-SCNC: 106 MMOL/L (ref 94–109)
CO2 SERPL-SCNC: 18 MMOL/L (ref 20–32)
CREAT SERPL-MCNC: 0.85 MG/DL (ref 0.52–1.04)
ERYTHROCYTE [DISTWIDTH] IN BLOOD BY AUTOMATED COUNT: 20.7 % (ref 10–15)
GFR SERPL CREATININE-BSD FRML MDRD: 69 ML/MIN/1.7M2
GLUCOSE BLDC GLUCOMTR-MCNC: 138 MG/DL (ref 70–99)
GLUCOSE BLDC GLUCOMTR-MCNC: 140 MG/DL (ref 70–99)
GLUCOSE BLDC GLUCOMTR-MCNC: 164 MG/DL (ref 70–99)
GLUCOSE BLDC GLUCOMTR-MCNC: 192 MG/DL (ref 70–99)
GLUCOSE SERPL-MCNC: 143 MG/DL (ref 70–99)
HCT VFR BLD AUTO: 23.9 % (ref 35–47)
HGB BLD-MCNC: 8.3 G/DL (ref 11.7–15.7)
LACTATE BLD-SCNC: 2.1 MMOL/L (ref 0.7–2.1)
LACTATE BLD-SCNC: 2.3 MMOL/L (ref 0.7–2.1)
Lab: NORMAL
Lab: NORMAL
MAGNESIUM SERPL-MCNC: 2.4 MG/DL (ref 1.6–2.3)
MCH RBC QN AUTO: 32.4 PG (ref 26.5–33)
MCHC RBC AUTO-ENTMCNC: 34.7 G/DL (ref 31.5–36.5)
MCV RBC AUTO: 93 FL (ref 78–100)
MICRO REPORT STATUS: NORMAL
MICRO REPORT STATUS: NORMAL
PLATELET # BLD AUTO: 30 10E9/L (ref 150–450)
POTASSIUM SERPL-SCNC: 4.6 MMOL/L (ref 3.4–5.3)
PROCALCITONIN SERPL-MCNC: 1.23 NG/ML
RBC # BLD AUTO: 2.56 10E12/L (ref 3.8–5.2)
SODIUM SERPL-SCNC: 133 MMOL/L (ref 133–144)
SPECIMEN SOURCE: NORMAL
SPECIMEN SOURCE: NORMAL
WBC # BLD AUTO: 3.3 10E9/L (ref 4–11)

## 2017-06-25 PROCEDURE — 25000128 H RX IP 250 OP 636: Performed by: STUDENT IN AN ORGANIZED HEALTH CARE EDUCATION/TRAINING PROGRAM

## 2017-06-25 PROCEDURE — 36592 COLLECT BLOOD FROM PICC: CPT | Performed by: INTERNAL MEDICINE

## 2017-06-25 PROCEDURE — 83735 ASSAY OF MAGNESIUM: CPT | Performed by: INTERNAL MEDICINE

## 2017-06-25 PROCEDURE — 21400006 ZZH R&B CCU INTERMEDIATE UMMC

## 2017-06-25 PROCEDURE — 25000132 ZZH RX MED GY IP 250 OP 250 PS 637: Performed by: STUDENT IN AN ORGANIZED HEALTH CARE EDUCATION/TRAINING PROGRAM

## 2017-06-25 PROCEDURE — 97530 THERAPEUTIC ACTIVITIES: CPT | Mod: GP | Performed by: PHYSICAL THERAPIST

## 2017-06-25 PROCEDURE — 25000132 ZZH RX MED GY IP 250 OP 250 PS 637: Performed by: NURSE PRACTITIONER

## 2017-06-25 PROCEDURE — 40000133 ZZH STATISTIC OT WARD VISIT

## 2017-06-25 PROCEDURE — 99233 SBSQ HOSP IP/OBS HIGH 50: CPT | Performed by: INTERNAL MEDICINE

## 2017-06-25 PROCEDURE — 97140 MANUAL THERAPY 1/> REGIONS: CPT | Mod: GO

## 2017-06-25 PROCEDURE — 40000802 ZZH SITE CHECK

## 2017-06-25 PROCEDURE — 85027 COMPLETE CBC AUTOMATED: CPT | Performed by: INTERNAL MEDICINE

## 2017-06-25 PROCEDURE — 25000131 ZZH RX MED GY IP 250 OP 636 PS 637: Performed by: STUDENT IN AN ORGANIZED HEALTH CARE EDUCATION/TRAINING PROGRAM

## 2017-06-25 PROCEDURE — 27210436 ZZH NUTRITION PRODUCT SEMIELEM INTERMED CAN

## 2017-06-25 PROCEDURE — P9047 ALBUMIN (HUMAN), 25%, 50ML: HCPCS | Performed by: INTERNAL MEDICINE

## 2017-06-25 PROCEDURE — 25000132 ZZH RX MED GY IP 250 OP 250 PS 637: Performed by: INTERNAL MEDICINE

## 2017-06-25 PROCEDURE — 00000146 ZZHCL STATISTIC GLUCOSE BY METER IP

## 2017-06-25 PROCEDURE — 83605 ASSAY OF LACTIC ACID: CPT | Performed by: INTERNAL MEDICINE

## 2017-06-25 PROCEDURE — 80048 BASIC METABOLIC PNL TOTAL CA: CPT | Performed by: INTERNAL MEDICINE

## 2017-06-25 PROCEDURE — 25000132 ZZH RX MED GY IP 250 OP 250 PS 637

## 2017-06-25 PROCEDURE — 84145 PROCALCITONIN (PCT): CPT | Performed by: INTERNAL MEDICINE

## 2017-06-25 PROCEDURE — 25000125 ZZHC RX 250: Performed by: INTERNAL MEDICINE

## 2017-06-25 PROCEDURE — 99233 SBSQ HOSP IP/OBS HIGH 50: CPT | Mod: 25 | Performed by: INTERNAL MEDICINE

## 2017-06-25 PROCEDURE — 25000128 H RX IP 250 OP 636: Performed by: INTERNAL MEDICINE

## 2017-06-25 PROCEDURE — 40000193 ZZH STATISTIC PT WARD VISIT: Performed by: PHYSICAL THERAPIST

## 2017-06-25 RX ORDER — ALBUMIN (HUMAN) 12.5 G/50ML
12.5 SOLUTION INTRAVENOUS ONCE
Status: COMPLETED | OUTPATIENT
Start: 2017-06-25 | End: 2017-06-25

## 2017-06-25 RX ORDER — DIGOXIN 125 MCG
125 TABLET ORAL DAILY
Status: DISCONTINUED | OUTPATIENT
Start: 2017-06-26 | End: 2017-08-04 | Stop reason: HOSPADM

## 2017-06-25 RX ADMIN — FOLIC ACID 1 MG: 1 TABLET ORAL at 09:01

## 2017-06-25 RX ADMIN — MULTIPLE VITAMINS W/ MINERALS TAB 1 TABLET: TAB at 09:01

## 2017-06-25 RX ADMIN — SODIUM CHLORIDE 500 ML: 9 INJECTION, SOLUTION INTRAVENOUS at 09:51

## 2017-06-25 RX ADMIN — GABAPENTIN 200 MG: 100 CAPSULE ORAL at 13:52

## 2017-06-25 RX ADMIN — TRIAMCINOLONE ACETONIDE: 1 OINTMENT TOPICAL at 09:02

## 2017-06-25 RX ADMIN — ALBUMIN (HUMAN) 12.5 G: 12.5 SOLUTION INTRAVENOUS at 11:00

## 2017-06-25 RX ADMIN — SODIUM CHLORIDE, PRESERVATIVE FREE 5 ML: 5 INJECTION INTRAVENOUS at 09:05

## 2017-06-25 RX ADMIN — Medication 2 G: at 03:03

## 2017-06-25 RX ADMIN — Medication 62.5 MCG: at 22:10

## 2017-06-25 RX ADMIN — APIXABAN 2.5 MG: 2.5 TABLET, FILM COATED ORAL at 10:59

## 2017-06-25 RX ADMIN — PANTOPRAZOLE SODIUM 40 MG: 40 TABLET, DELAYED RELEASE ORAL at 09:01

## 2017-06-25 RX ADMIN — Medication 30 MG: at 09:01

## 2017-06-25 RX ADMIN — GABAPENTIN 200 MG: 100 CAPSULE ORAL at 20:16

## 2017-06-25 RX ADMIN — GABAPENTIN 200 MG: 100 CAPSULE ORAL at 09:01

## 2017-06-25 RX ADMIN — TRIAMCINOLONE ACETONIDE: 1 OINTMENT TOPICAL at 20:20

## 2017-06-25 RX ADMIN — Medication 2 G: at 09:50

## 2017-06-25 RX ADMIN — CALCIUM CARBONATE 600 MG (1,500 MG)-VITAMIN D3 400 UNIT TABLET 2 TABLET: at 09:00

## 2017-06-25 RX ADMIN — PREDNISONE 10 MG: 10 TABLET ORAL at 09:01

## 2017-06-25 RX ADMIN — INSULIN ASPART 1 UNITS: 100 INJECTION, SOLUTION INTRAVENOUS; SUBCUTANEOUS at 09:01

## 2017-06-25 RX ADMIN — INSULIN ASPART 1 UNITS: 100 INJECTION, SOLUTION INTRAVENOUS; SUBCUTANEOUS at 18:05

## 2017-06-25 RX ADMIN — ATENOLOL 50 MG: 50 TABLET ORAL at 09:01

## 2017-06-25 RX ADMIN — APIXABAN 2.5 MG: 2.5 TABLET, FILM COATED ORAL at 20:16

## 2017-06-25 RX ADMIN — Medication 2 G: at 20:20

## 2017-06-25 RX ADMIN — ERTAPENEM SODIUM 1 G: 1 INJECTION, POWDER, LYOPHILIZED, FOR SOLUTION INTRAMUSCULAR; INTRAVENOUS at 11:01

## 2017-06-25 RX ADMIN — Medication 25000 UNITS: at 09:01

## 2017-06-25 RX ADMIN — ACETAMINOPHEN 650 MG: 325 TABLET, FILM COATED ORAL at 03:03

## 2017-06-25 RX ADMIN — Medication 220 MG: at 09:01

## 2017-06-25 RX ADMIN — OXYCODONE HYDROCHLORIDE AND ACETAMINOPHEN 500 MG: 500 TABLET ORAL at 09:01

## 2017-06-25 RX ADMIN — Medication 62.5 MCG: at 11:00

## 2017-06-25 RX ADMIN — INSULIN ASPART 1 UNITS: 100 INJECTION, SOLUTION INTRAVENOUS; SUBCUTANEOUS at 11:45

## 2017-06-25 ASSESSMENT — PAIN DESCRIPTION - DESCRIPTORS: DESCRIPTORS: HEADACHE

## 2017-06-25 NOTE — PLAN OF CARE
Problem: Goal Outcome Summary  Goal: Goal Outcome Summary  Outcome: No Change  A&Ox4, on RA.  Sinus tach/Aflutter 110s-120s.  Lifevest on, backup battery at bedside.  Soft BPs (80s-90s/60s).  500 ml fluid bolus and 12.5 albumin given.  Lactic acid trending down from 2.3 to 2.1.  No complaints of pain.  Repositioned q2h.  Pt incontinent of stool and urine.  Lymph wraps on LE in place.  TF infusing at 40 ml/hr from 0698-0420.  Blood sugars assessed and managed with SSI.  Activity and oral intake encouraged.  Plan to discharge to ARU this week.  Will continue to monitor and notify [facility] with any concerns or questions.

## 2017-06-25 NOTE — PROGRESS NOTES
"S: Notified by nursing of soft blood pressures, continues to have elevated HR.     Per cards most recent DC summary, 06/15/2017, \"Ms. Michel is a 55 yo F with hx HTN, RA (on MTX, Plaquenil, Prednisone), Afib/Flutter, VSD s/p repair who was admitted on 5/29 after out of hospital cardiac arrest with ROSC and s/p therapeutic hypothermia. She went to cath lab on 5/29 which showed normal coronaries. The etiology of the cardiac arrest remains unclear. This cardiac arrest caused multi-organ failure including bone marrow failure, hypoxic respiratory failure requiring mechanical ventilation, and acute kidney injury. She also suffered some degree of anoxic brain injury, though there is minimal clinical evidence of this, and severe critical illness myopathy. All of these required a stay in the intensive care unit at the [facility] and a lengthy hospitalization afterwards. She has been treated with Meropenem and Ertapenem for ESBL UTI and aspiration PNA during this hospitalization. Shock liver and CELSA have resolved. Hgb has required transfusion but now stable 8-9. WBC mildly low with normal neutrophils. She has been treated with stress dose steroids and now steroid tape w/ PO Prednisone w/ history of RA. Pancytopenia w/ Plts as low as 15, now stabilized in the 30-40's w/ previous intermittent transfusions. Plan for ICD s/p cardiac arrest but unable to proceed w/ implantation during this hospitalization due to LUE extravasation and rash (high infectious risk). Will plan to discharge to rehabilitation facility for severe myopathy/deconditioning and continued aggressive rehabilitaiton w/ LifeVest and close follow-up.\" She was readmitted from ARU for sepsis, thought to be 2/2 LUE cellulitis, currently on ertapanem.    She notes her wound is getting gbetter. She feels fine at the moment. She endorses fatigue, denies confusion, nausea, lightheadedness. She overall feels fine. No new focal symptoms.     O: " "Vital signs:  Temp: 97.6  F (36.4  C) Temp src: Oral BP: (!) 74/58 Pulse: 123 Heart Rate: 115 Resp: 16 SpO2: 97 % O2 Device: Nasal cannula Oxygen Delivery: 2 LPM Height: 148 cm (4' 10.25\") Weight: 81.6 kg (179 lb 12.7 oz)  Estimated body mass index is 37.25 kg/(m^2) as calculated from the following:    Height as of this encounter: 1.48 m (4' 10.25\").    Weight as of this encounter: 81.6 kg (179 lb 12.7 oz).    Gen: Awake, alert, lying comfortably in bed, oriented x3  CV: Tachycardic, regular, no murmurs  PULM: Mild crackles in bases, no wheezing  Ext: Lymphedema wraps in place    #Hypotension: Blood pressures in 70s/50s, MAPS of low 60s w/ HR in the 1 teens. She denies chest pain. She denies dyspnea, cough. She denies fevers/chills. Mentating appropriately Lactic acid was 2.4 this afternoon, improved to 2.2 after 250 cc NS bolus and 12.5 G of 25% albumin. DDx includes hypovolemia, bleed (less likely), cardiogenic shock, worsening sepsis (less likely, afebrile, WBC count stable).  -EKG, trop, BNP, CBC, BMP and lytes now  -Albumin 12.5 G of 25% now  -Dr. Blair discussed with cardiology, goal MAPS >60, and teams will monitor closely tonight    Notify if any acute changes, medicine will continue to follow, signed out to Dr. Blair.     CRISTA Kirk  "

## 2017-06-25 NOTE — PROGRESS NOTES
[facility], [place]   Hospitalist Daily Progress Note                                                 Date of Admission:2017  ___________________________________  INTERVAL HISTORY (24 Hrs)/SUBJECTIVE:   Last 24 hr events, care team notes reviewed.     Overnight with low bp, lactic acidosis 2.4. Better this am.   Continues to report sob worse with any activity.   HR mostly in 110-120s. Pause up to 1.8 sec  Gen weakness.   No cp  No fever or chills  No LH or dizziness  No other concern.        ROS: 4 point ROS neg other than the symptoms noted above in the interval history.    OBJECTIVE :   VITALS:    Temp:  [97.6  F (36.4  C)-98.5  F (36.9  C)] 97.7  F (36.5  C)  Heart Rate:  [108-122] 114  Resp:  [16-20] 18  BP: (64-99)/(45-67) 99/66  SpO2:  [93 %-99 %] 94 %    Temp (24hrs), Av.9  F (36.6  C), Min:97.6  F (36.4  C), Max:98.5  F (36.9  C)      Wt Readings from Last 5 Encounters:   17 85.3 kg (188 lb)   17 75.7 kg (166 lb 12.8 oz)   17 72.8 kg (160 lb 8 oz)   17 64.4 kg (141 lb 15.6 oz)   17 61.6 kg (135 lb 12.8 oz)          PHYSICAL EXAM:  General: alert, interactive, NAD, Nasal feeding tube.   HEENT: AT/NC,  Moist MM  Neck: Supple,  Respi/Chest: Non labored. Clear BL , poor effort.    CVS/Heart: S1S2 irregular. Tachy. Life vest+  bl pedal edema on ace wraps.   Gi/Abd: Soft, non tender, non distended,   MSK/Ext: distally warm, well perfused.  L UE wound: cellulitis: improving. On dressing- dry  Neuro: AO x 4,      Medications:   I have reviewed this patient's current medications.    Data:   All laboratory and imaging data in the past 24 hours reviewed:    LABS:  CMP    Recent Labs  Lab 17  0710 17  2221 17  0623 17  1358 17  0653  17  0608    132*  --  131* 135  < > 135   POTASSIUM 4.6 4.9 4.9 5.4* 4.0  < > 4.6   CHLORIDE 106 106  --  106 107  < > 104   CO2 18* 18*  --  20 20  < > 21   ANIONGAP 10 8  --  5  8  < > 10   * 151*  --  154* 152*  < > 159*   BUN 47* 47*  --  39* 34*  < > 31*   CR 0.85 0.85  --  0.78 0.62  < > 0.66   GFRESTIMATED 69 69  --  76 >90Non  GFR Calc  < > >90Non  GFR Calc   GFRESTBLACK 83 84  --  >90African American GFR Calc >90African American GFR Calc  < > >90African American GFR Calc   VIKTORIYA 7.3* 7.1*  --  7.2* 7.2*  < > 7.8*   MAG 2.4* 1.9 1.9  --  1.8  < >  --    PHOS  --  3.4  --   --   --   --   --    PROTTOTAL  --   --   --   --   --   --  5.4*   ALBUMIN  --   --   --   --   --   --  1.8*   BILITOTAL  --   --   --   --   --   --  0.7   ALKPHOS  --   --   --   --   --   --  180*   AST  --   --   --   --   --   --  41   ALT  --   --   --   --   --   --  63*   < > = values in this interval not displayed.  Corrected Ca:~ 9.0 6/22/2017      CBC    Recent Labs  Lab 06/25/17  0710 06/24/17  2221 06/23/17  1358 06/22/17  0653   WBC 3.3* 4.0 3.4* 2.5*   RBC 2.56* 2.58* 2.62* 2.49*   HGB 8.3* 8.4* 8.1* 7.9*   HCT 23.9* 24.3* 24.8* 23.7*   MCV 93 94 95 95   MCH 32.4 32.6 30.9 31.7   MCHC 34.7 34.6 32.7 33.3   RDW 20.7* 20.4* 20.8* 21.2*   PLT 30* 21* 27* 33*     INR    Recent Labs  Lab 06/22/17  0653 06/21/17  0623 06/20/17  0709 06/19/17  1612   INR 1.33* 1.42* 1.29* 1.44*     Unresulted Labs Ordered in the Past 30 Days of this Admission     Date and Time Order Name Status Description    6/19/2017 0725 Anaerobic bacterial culture Preliminary     5/31/2017 1012 s100 B: Laboratory Miscellaneous Order In process     5/29/2017 1046 Adalimumab Activity and Neutralizing Antibodies: Laboratory Miscellaneous Order In process           Echo: 06/05:     Limited, technically difficult study. Extremely difficult acoustic windows  despite the use of contrast for endcardial border definition.  Borderline (EF 50-55%) reduced left ventricular function is present.  Additionally, the abnormal non-specific septal motion and irregular cardiac  cycle contribute to the difficulty in  accurately measuring LVEF.  The IVC is dilated at 2.5 cm, c/w increased RAP. The RAP is in excess of 15  mmHg.    ASSESSMENT & PLAN :    Amelia Michel is a 56 year old woman with hx of VSD repair (1969), Rheumatoid arthritis, and a recent diagnosis of Afib/Aflutter/SVT, who presented to [facility] on 5/29 after Out of Hospital cardiac arrest with shockable rhythm. After ROSC in the field, she was admitted from 5/29-6/15, extubated 6/4. While at ARU (6/15-6/19), pt developed worsening LUE wound pain and fever, and was admitted back to [facility] for further workup. Admitted 6/19/2017         Changes Today:    - 6/25/2017  Hypotension, lactic acidosis: suspect MF including intravascular hypovolemia, (limited echo - normal IVC), atenolol, lasix. Poor po intake. Unlikely worsening of sepsis. Improved with small fluid 250 ml nss bolus plus albumin.   Given another 500 ml nss bolus plus 12.5 gm albumin today am--> MAP >65 at current. Feels better.     - Afib/flutter w RVR: HR: 110-120s today. D/w Cardiology. Rec: digoxin 62.5 mcg daily, ordered.     -  L UE wound infection: ID on board. Clinically Remains afebrile. Cellulitis improving.  Continue iv Ertapenem.  Repeat C.diff neg.      - PT/OT: activity as tolerated.  - malnutrition: Encourage oral intake. Ct Tube feeding.                 # Sepsis likely from L UE wound infection. On adm: SIRS +  (Fever, leukopenia, tachcyardia)   - Hemodynamics stable.   Improving.   Lactic acidosis: resolved w/ ivf    C.diff neg x 2   Enteric panel, stool: neg.   UC: mixed urogenital harshal.   WC:   Heavy growth Enterobacter cloacae complex   Moderate growth Enterococcus faecalis   Moderate growth Coagulase negative Staphylococcus   susceptibility reviewed.  BC: no growth so far.     Left UE Wound with concern for cellulitis +/- myositis with potential area of myonecrosis:    L UE wound onset around time of cardiac arrest, unclear etiology (?trauma during code), exacerbated by fluid extravasation  from IV during last admission. Over course of rehab, pt has noticed increasing pain in L UE with therapy and spiked fevers to 101F and 102F on 6/18 and 6/19, respectively. On admission, wound appears erythematous, with eschar and fibrinous material overlying wound (no purulent drainage), tender to palpation.    6/19 L Elbow XR:  Negative for osteo, diffuse subcutaneous soft tissue stranding, possibly representative of cellulitis   6/19 L Elbow MRI: Diffuse cellulitis and multicompartment soft tissue tissue edema within the muscles about the left elbow, with a focal area of nonenhancement,  centered in the brachialis muscle. Differential includes myositis,including infectious or inflammatory etiology. A focal area of myonecrosis is likely within the brachialis muscle. Correlate clinically.    --Started empirically on iv Vancomycin/Ertapenem and added levofloxacin 6/20 for pseudomonal coverage  --General Surgery: 06/19: no surgical indication.   --Pain control: Tramadol 25mg q6h prn, Continue morphine gel around edges of wound for pain   --WOCN on board. Ct wound care.     -- 6/21/2017: ID consulted. Dc iv levaquin.    -- 6/24/2017: DC iv Vanco  -- 6/25/2017: Continue iv Ertapenem.       *Further Per ID      # Atrial fibrillation with RVR:      Known Afib w/ RVR from prior hospitalization w/ rates difficult to control despite being on Metoprolol 100 mg XL and Diltiazem. She had 1 DCCV for which she reverted within 24hrs. Sotolol was also attempted but patient did not tolerate the medication due to nausea and vomiting and subsequently failed Metoprolol and Propranolol for the same issue. She was discharged during this previous hospitalization on Amiodarone and was also started on Apixaban. Patient had no evidence of ventriclar arrhythmia prior to discharge. RFA was discuss but was defered as patient had a UTI at the time with ESBL. Given difficulty to control rates, an CMR was performed to r/o inflammation or scar as  cause of arrhythmia. CMR did not show atrial inflammation or abnormalities except for enlargement. 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   Appreciate cardiology consult.   Dc Digoxin per cardiology. Level high 06/20: 3.4.     - Continue Atenolol 50 mg daily. Dc atenolol given low bp 06/24 overnight.   6/25/2017: per cardiology, ordered digoxin 62.5 mcg daily  - fu K, mg, replete per protocol.   - Optimize vol status as able. Lasix 20 mg po x 1 6/24/2017  - Limited Echo for IVC _ normal ivc in limited view.   - Consider formal EP consult if rates are consistently >120-130 or if patient is symptomatic     Other issues:       # Out of Hospital Cardiac Arrest and Cardiogenic Shock:   - ICD will be deferred at this time in lieu of LUE extravasation/high risk of infection. 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 Plts noted to decrease from 104 to as low as 15 during recent hospitalization requiring transfusion w/ chronically low in the 's as outpatient.  -- If this is chronic ITP, no need for treatment unless plts drift < 30s  -- Continue to hold RA meds for now  -- Continue Apixaban 2.5 mg BID for anticoagulation. This will need to be held 2 days prior to ICD placement after discharge (this will be deferred given acute infection).        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 Now on 10 mg daily, taper to 7.5 mg on 6/29, then to 5 mg daily after 2 weeks if continues to have low disease activity  -- Continue to hold HCQ and  MTX until outpatient follow-up  -- Follow-up with [facility] Rheumatology clinic Dr. Conor Cunha in 4-6 weeks after discharge      # Severe Critical Illness Myopathy:   Significant deconditioning w/ median neuropathies most likely localized to the wrists and ulnar neuropathies most likely localized to the elbows secondary to RA.  -- Ongoing extensive PT/OT/SLP     # ESBL UTI: Completed course abx.      # Malnutrition:     -- Nutrition recommendations are to continue Peptamen 1.5 Tube feeds to 40 ml/hr x 11 hours which will provide 440 ml TF, 660 kcals, and 30 g protein daily. TFs may be adjusted pending oral intake. Once kcal counts reveal that patient is consuming at least 900 kcals and 45 g protein daily on average can discontinue.  -- Water flushes as needed.  -- Nutrition on board.      Consulting Services: Cardiology, Infectious disease. PMR.      CODE: Full  DVT Ppx: Apixaban  Diet/Fluids: Cycled tube feeds, calorie counts,  nutrition following  6/22/2017: per SLP: regular w thin.     - Electrolyte Protocol:  Yes  - Telemetry: Yes; Life Vest needs to be worn at all times    Disposition Plan   Expected discharge in 2-3 days to likely back to ARU once medically stable.      Entered: Johnie Chino 06/25/2017, 2:42 PM      Pt's care was discussed with patient, RN.  at bedside  D/w Cardiology.         Johnie Chino MD   Hospitalist (Internal Medicine)  Pager: [phone].

## 2017-06-25 NOTE — PLAN OF CARE
Problem: Goal Outcome Summary  Goal: Goal Outcome Summary  Outcome: No Change  D: Pt admitted 6/19 with sepsis 2/2 L arm cellulitis (from extravasation). Recent hospitalization after OOH cardiac arrest.  I/A: Pt's pressures continued to be soft after 250cc bolus and 12.5 albumin. Pt denied lightheadness/dizziness, mentation clear. Paged Gold2 cross cover who came to assess pt. Labs ordered. Additional 12.5 albumin given with improvement in BP. 2g Mag given for mag 1.9. Tylenol given x1 for headache. Pt continues with significant generalized edema- lymph wraps on BLE, all extremities elevated. Continues with urinary & fecal incontinence- incontinence cares done. Turned/repositioned pt as tolerated. Pt was on 2L O2, O2 turned off by MD team, sats stable and pt denied any increased SOB. TF infusing at 40ml/hr (6p-6a). Lifevest on, spare batteries charged. 's-120's (likely Atrial flutter all night- some breaks with visible flutter waves). BG monitored, SSI given with supper. Pt resting between cares overnight.   P: Continue to monitor and assess pt condition and contact treatment team with questions or concerns.

## 2017-06-25 NOTE — PLAN OF CARE
Problem: Goal Outcome Summary  Goal: Goal Outcome Summary  PT/CR/6C     Pt needs mod A for supine<>sit w/ greater need for assist at trunk when >sitting and at LEs when >supine. Pt did not attempt scooting, given max A to scoot in bed--pt at disadvantage w/ height and edema. Pt sat EOB for majority of session w/ CGA-min A x1 for balance. Pt performed seated LE exercises until fatigue. Changed Lifevest jacket while EOB. Pt is SOB at rest, breathing more labored w/ activity but no change in HR.     When medically appropriate, recommend discharge to ARU. Pt is self motivated, has social support from , and understands importance of therapy and is far below PLOF.

## 2017-06-25 NOTE — PLAN OF CARE
Problem: Goal Outcome Summary  Goal: Goal Outcome Summary  Outcome: No Change  A&Ox4, on 3 L NC. Sinus tach 110s.  Pt in and out of Aflutter/Afib.  BPs 70s-80s/50s-60s.  Pt asymptomatic. Sx aware.  PRN atentolol if HR sustained above 120.  Lifevest in place.  Backup battery at bedside.  Pt assist of 2, repositioning q2h.  TF at 40 ml/hr from 4651-0063.  Sepsis protocol triggered.  Lactic acid 2.4.  Service notified.  250 ml fluid bolus and albumin given.  Will continue to monitor and notify Med Gold 2 with any concerns or questions.

## 2017-06-25 NOTE — PLAN OF CARE
Problem: Goal Outcome Summary  Goal: Goal Outcome Summary  OT/Edema/6C:  Pt tolerated edema wraps well according to wear schedule.  Wraps removed and skin cares performed.  Skin remains intact with 2+/3+ pitting remaining in B LEs.  GCB remains appropriate treatment for edema management.  Full leg wraps applied from MTPs to mid thigh on L LE and half leg wrap applied from MTPs to knee crease on R LE.  Plan to continue alternating full leg wraps for management of LE edema while allowing ease with functional mobility. Wraps may be worn until therapy returns; however, please remove if increased pain, numbness/tingling, or soiling occurs.

## 2017-06-26 ENCOUNTER — APPOINTMENT (OUTPATIENT)
Dept: GENERAL RADIOLOGY | Facility: CLINIC | Age: 57
DRG: 871 | End: 2017-06-26
Attending: INTERNAL MEDICINE
Payer: COMMERCIAL

## 2017-06-26 ENCOUNTER — APPOINTMENT (OUTPATIENT)
Dept: CARDIOLOGY | Facility: CLINIC | Age: 57
DRG: 871 | End: 2017-06-26
Attending: STUDENT IN AN ORGANIZED HEALTH CARE EDUCATION/TRAINING PROGRAM
Payer: COMMERCIAL

## 2017-06-26 ENCOUNTER — HOSPITAL ENCOUNTER (INPATIENT)
Dept: VASCULAR ULTRASOUND | Facility: CLINIC | Age: 57
DRG: 871 | End: 2017-06-26
Attending: STUDENT IN AN ORGANIZED HEALTH CARE EDUCATION/TRAINING PROGRAM | Admitting: INTERNAL MEDICINE
Payer: COMMERCIAL

## 2017-06-26 ENCOUNTER — APPOINTMENT (OUTPATIENT)
Dept: GENERAL RADIOLOGY | Facility: CLINIC | Age: 57
DRG: 871 | End: 2017-06-26
Attending: STUDENT IN AN ORGANIZED HEALTH CARE EDUCATION/TRAINING PROGRAM
Payer: COMMERCIAL

## 2017-06-26 LAB
ALBUMIN UR-MCNC: 10 MG/DL
AMORPH CRY #/AREA URNS HPF: ABNORMAL /HPF
ANION GAP SERPL CALCULATED.3IONS-SCNC: 10 MMOL/L (ref 3–14)
APPEARANCE UR: ABNORMAL
BACTERIA SPEC CULT: NORMAL
BASE DEFICIT BLDV-SCNC: 5.2 MMOL/L
BASE DEFICIT BLDV-SCNC: 6.5 MMOL/L
BILIRUB UR QL STRIP: NEGATIVE
BLD PROD TYP BPU: NORMAL
BLD PROD TYP BPU: NORMAL
BLD UNIT ID BPU: 0
BLOOD PRODUCT CODE: NORMAL
BPU ID: NORMAL
BUN SERPL-MCNC: 42 MG/DL (ref 7–30)
CALCIUM SERPL-MCNC: 7.5 MG/DL (ref 8.5–10.1)
CHLORIDE SERPL-SCNC: 106 MMOL/L (ref 94–109)
CO2 SERPL-SCNC: 17 MMOL/L (ref 20–32)
COLOR UR AUTO: YELLOW
CREAT SERPL-MCNC: 0.72 MG/DL (ref 0.52–1.04)
DIGOXIN SERPL-MCNC: 0.7 UG/L (ref 0.5–2)
ERYTHROCYTE [DISTWIDTH] IN BLOOD BY AUTOMATED COUNT: 20.9 % (ref 10–15)
GFR SERPL CREATININE-BSD FRML MDRD: 84 ML/MIN/1.7M2
GLUCOSE BLDC GLUCOMTR-MCNC: 113 MG/DL (ref 70–99)
GLUCOSE BLDC GLUCOMTR-MCNC: 145 MG/DL (ref 70–99)
GLUCOSE BLDC GLUCOMTR-MCNC: 163 MG/DL (ref 70–99)
GLUCOSE SERPL-MCNC: 166 MG/DL (ref 70–99)
GLUCOSE UR STRIP-MCNC: NEGATIVE MG/DL
GRAN CASTS #/AREA URNS LPF: 14 /LPF
HCO3 BLDV-SCNC: 18 MMOL/L (ref 21–28)
HCO3 BLDV-SCNC: 20 MMOL/L (ref 21–28)
HCT VFR BLD AUTO: 26.3 % (ref 35–47)
HGB BLD-MCNC: 8.8 G/DL (ref 11.7–15.7)
HGB UR QL STRIP: NEGATIVE
HYALINE CASTS #/AREA URNS LPF: 7 /LPF (ref 0–2)
INTERPRETATION ECG - MUSE: NORMAL
KETONES UR STRIP-MCNC: NEGATIVE MG/DL
LACTATE BLD-SCNC: 2 MMOL/L (ref 0.7–2.1)
LACTATE BLD-SCNC: 2.3 MMOL/L (ref 0.7–2.1)
LACTATE BLD-SCNC: 2.8 MMOL/L (ref 0.7–2.1)
LEUKOCYTE ESTERASE UR QL STRIP: ABNORMAL
Lab: NORMAL
MAGNESIUM SERPL-MCNC: 2.1 MG/DL (ref 1.6–2.3)
MCH RBC QN AUTO: 31.5 PG (ref 26.5–33)
MCHC RBC AUTO-ENTMCNC: 33.5 G/DL (ref 31.5–36.5)
MCV RBC AUTO: 94 FL (ref 78–100)
MICRO REPORT STATUS: NORMAL
MISCELLANEOUS TEST: NORMAL
MUCOUS THREADS #/AREA URNS LPF: PRESENT /LPF
NITRATE UR QL: NEGATIVE
NT-PROBNP SERPL-MCNC: 2348 PG/ML (ref 0–900)
NUM BPU REQUESTED: 1
O2/TOTAL GAS SETTING VFR VENT: 20 %
O2/TOTAL GAS SETTING VFR VENT: ABNORMAL %
PCO2 BLDV: 32 MM HG (ref 40–50)
PCO2 BLDV: 36 MM HG (ref 40–50)
PH BLDV: 7.35 PH (ref 7.32–7.43)
PH BLDV: 7.37 PH (ref 7.32–7.43)
PH UR STRIP: 6 PH (ref 5–7)
PLATELET # BLD AUTO: 25 10E9/L (ref 150–450)
PO2 BLDV: 34 MM HG (ref 25–47)
PO2 BLDV: 35 MM HG (ref 25–47)
POTASSIUM SERPL-SCNC: 4.8 MMOL/L (ref 3.4–5.3)
PROCALCITONIN SERPL-MCNC: 0.83 NG/ML
RBC # BLD AUTO: 2.79 10E12/L (ref 3.8–5.2)
RBC #/AREA URNS AUTO: 1 /HPF (ref 0–2)
SODIUM SERPL-SCNC: 134 MMOL/L (ref 133–144)
SP GR UR STRIP: 1.02 (ref 1–1.03)
SPECIMEN SOURCE: NORMAL
SQUAMOUS #/AREA URNS AUTO: <1 /HPF (ref 0–1)
TRANS CELLS #/AREA URNS HPF: <1 /HPF (ref 0–1)
TRANSFUSION STATUS PATIENT QL: NORMAL
TRANSFUSION STATUS PATIENT QL: NORMAL
URN SPEC COLLECT METH UR: ABNORMAL
UROBILINOGEN UR STRIP-MCNC: NORMAL MG/DL (ref 0–2)
WBC # BLD AUTO: 3.2 10E9/L (ref 4–11)
WBC #/AREA URNS AUTO: 1 /HPF (ref 0–2)

## 2017-06-26 PROCEDURE — 36569 INSJ PICC 5 YR+ W/O IMAGING: CPT

## 2017-06-26 PROCEDURE — 02HV33Z INSERTION OF INFUSION DEVICE INTO SUPERIOR VENA CAVA, PERCUTANEOUS APPROACH: ICD-10-PCS | Performed by: INTERNAL MEDICINE

## 2017-06-26 PROCEDURE — 80048 BASIC METABOLIC PNL TOTAL CA: CPT | Performed by: INTERNAL MEDICINE

## 2017-06-26 PROCEDURE — 80162 ASSAY OF DIGOXIN TOTAL: CPT | Performed by: INTERNAL MEDICINE

## 2017-06-26 PROCEDURE — 87086 URINE CULTURE/COLONY COUNT: CPT | Performed by: STUDENT IN AN ORGANIZED HEALTH CARE EDUCATION/TRAINING PROGRAM

## 2017-06-26 PROCEDURE — 71010 XR CHEST PORT 1 VW: CPT

## 2017-06-26 PROCEDURE — P9037 PLATE PHERES LEUKOREDU IRRAD: HCPCS | Performed by: STUDENT IN AN ORGANIZED HEALTH CARE EDUCATION/TRAINING PROGRAM

## 2017-06-26 PROCEDURE — 25000132 ZZH RX MED GY IP 250 OP 250 PS 637: Performed by: NURSE PRACTITIONER

## 2017-06-26 PROCEDURE — 27210982 ZZH KIT RT HC TOTES DISP CR7

## 2017-06-26 PROCEDURE — 21400006 ZZH R&B CCU INTERMEDIATE UMMC

## 2017-06-26 PROCEDURE — 27210197 ZZH KIT POWER PICC TRIPLE LUMEN

## 2017-06-26 PROCEDURE — 00000146 ZZHCL STATISTIC GLUCOSE BY METER IP

## 2017-06-26 PROCEDURE — 36415 COLL VENOUS BLD VENIPUNCTURE: CPT | Performed by: STUDENT IN AN ORGANIZED HEALTH CARE EDUCATION/TRAINING PROGRAM

## 2017-06-26 PROCEDURE — 83605 ASSAY OF LACTIC ACID: CPT | Performed by: INTERNAL MEDICINE

## 2017-06-26 PROCEDURE — 40000275 ZZH STATISTIC RCP TIME EA 10 MIN

## 2017-06-26 PROCEDURE — 71020 XR CHEST 2 VW: CPT

## 2017-06-26 PROCEDURE — 27211181 ZZH BALLOON TIP PRESSURE CR5

## 2017-06-26 PROCEDURE — 4A023N6 MEASUREMENT OF CARDIAC SAMPLING AND PRESSURE, RIGHT HEART, PERCUTANEOUS APPROACH: ICD-10-PCS | Performed by: INTERNAL MEDICINE

## 2017-06-26 PROCEDURE — 99233 SBSQ HOSP IP/OBS HIGH 50: CPT | Performed by: INTERNAL MEDICINE

## 2017-06-26 PROCEDURE — 99223 1ST HOSP IP/OBS HIGH 75: CPT | Mod: GC | Performed by: INTERNAL MEDICINE

## 2017-06-26 PROCEDURE — 25000128 H RX IP 250 OP 636: Performed by: STUDENT IN AN ORGANIZED HEALTH CARE EDUCATION/TRAINING PROGRAM

## 2017-06-26 PROCEDURE — 83880 ASSAY OF NATRIURETIC PEPTIDE: CPT | Performed by: INTERNAL MEDICINE

## 2017-06-26 PROCEDURE — 83605 ASSAY OF LACTIC ACID: CPT | Performed by: STUDENT IN AN ORGANIZED HEALTH CARE EDUCATION/TRAINING PROGRAM

## 2017-06-26 PROCEDURE — 27210989 ZZH ACCESS EP PROC CR11

## 2017-06-26 PROCEDURE — 25000125 ZZHC RX 250: Performed by: STUDENT IN AN ORGANIZED HEALTH CARE EDUCATION/TRAINING PROGRAM

## 2017-06-26 PROCEDURE — 27210787 ZZH MANIFOLD CR2

## 2017-06-26 PROCEDURE — 25000132 ZZH RX MED GY IP 250 OP 250 PS 637

## 2017-06-26 PROCEDURE — 84145 PROCALCITONIN (PCT): CPT | Performed by: INTERNAL MEDICINE

## 2017-06-26 PROCEDURE — 82803 BLOOD GASES ANY COMBINATION: CPT | Performed by: STUDENT IN AN ORGANIZED HEALTH CARE EDUCATION/TRAINING PROGRAM

## 2017-06-26 PROCEDURE — 25000132 ZZH RX MED GY IP 250 OP 250 PS 637: Performed by: INTERNAL MEDICINE

## 2017-06-26 PROCEDURE — 93451 RIGHT HEART CATH: CPT | Mod: 26 | Performed by: INTERNAL MEDICINE

## 2017-06-26 PROCEDURE — 25000132 ZZH RX MED GY IP 250 OP 250 PS 637: Performed by: STUDENT IN AN ORGANIZED HEALTH CARE EDUCATION/TRAINING PROGRAM

## 2017-06-26 PROCEDURE — 25000125 ZZHC RX 250: Performed by: INTERNAL MEDICINE

## 2017-06-26 PROCEDURE — 93451 RIGHT HEART CATH: CPT

## 2017-06-26 PROCEDURE — 27210806 ZZH SHEATH CR5

## 2017-06-26 PROCEDURE — 27210577 ZZ H INTRODUCER MICRO SET

## 2017-06-26 PROCEDURE — 27210436 ZZH NUTRITION PRODUCT SEMIELEM INTERMED CAN

## 2017-06-26 PROCEDURE — 85027 COMPLETE CBC AUTOMATED: CPT | Performed by: INTERNAL MEDICINE

## 2017-06-26 PROCEDURE — 83735 ASSAY OF MAGNESIUM: CPT | Performed by: INTERNAL MEDICINE

## 2017-06-26 PROCEDURE — 81001 URINALYSIS AUTO W/SCOPE: CPT | Performed by: STUDENT IN AN ORGANIZED HEALTH CARE EDUCATION/TRAINING PROGRAM

## 2017-06-26 PROCEDURE — 36592 COLLECT BLOOD FROM PICC: CPT | Performed by: STUDENT IN AN ORGANIZED HEALTH CARE EDUCATION/TRAINING PROGRAM

## 2017-06-26 PROCEDURE — 87040 BLOOD CULTURE FOR BACTERIA: CPT | Performed by: STUDENT IN AN ORGANIZED HEALTH CARE EDUCATION/TRAINING PROGRAM

## 2017-06-26 PROCEDURE — 36592 COLLECT BLOOD FROM PICC: CPT | Performed by: INTERNAL MEDICINE

## 2017-06-26 RX ORDER — LIDOCAINE 40 MG/G
CREAM TOPICAL
Status: DISCONTINUED | OUTPATIENT
Start: 2017-06-26 | End: 2017-07-12

## 2017-06-26 RX ORDER — ALBUMIN (HUMAN) 12.5 G/50ML
12.5 SOLUTION INTRAVENOUS ONCE
Status: DISCONTINUED | OUTPATIENT
Start: 2017-06-26 | End: 2017-06-26

## 2017-06-26 RX ORDER — CALCIUM CARBONATE 500 MG/1
500 TABLET, CHEWABLE ORAL DAILY
Status: DISCONTINUED | OUTPATIENT
Start: 2017-06-27 | End: 2017-07-22

## 2017-06-26 RX ORDER — HEPARIN SODIUM,PORCINE 10 UNIT/ML
2-5 VIAL (ML) INTRAVENOUS
Status: COMPLETED | OUTPATIENT
Start: 2017-06-26 | End: 2017-06-27

## 2017-06-26 RX ORDER — PANTOPRAZOLE SODIUM 40 MG/1
40 TABLET, DELAYED RELEASE ORAL EVERY MORNING
Status: DISCONTINUED | OUTPATIENT
Start: 2017-06-27 | End: 2017-08-04 | Stop reason: HOSPADM

## 2017-06-26 RX ADMIN — ATENOLOL 50 MG: 50 TABLET ORAL at 08:33

## 2017-06-26 RX ADMIN — GABAPENTIN 200 MG: 100 CAPSULE ORAL at 08:34

## 2017-06-26 RX ADMIN — Medication 30 MG: at 08:33

## 2017-06-26 RX ADMIN — PREDNISONE 10 MG: 10 TABLET ORAL at 08:33

## 2017-06-26 RX ADMIN — INSULIN ASPART 1 UNITS: 100 INJECTION, SOLUTION INTRAVENOUS; SUBCUTANEOUS at 08:42

## 2017-06-26 RX ADMIN — GABAPENTIN 200 MG: 100 CAPSULE ORAL at 14:01

## 2017-06-26 RX ADMIN — MULTIPLE VITAMINS W/ MINERALS TAB 1 TABLET: TAB at 08:33

## 2017-06-26 RX ADMIN — OXYCODONE HYDROCHLORIDE AND ACETAMINOPHEN 500 MG: 500 TABLET ORAL at 08:34

## 2017-06-26 RX ADMIN — LIDOCAINE HYDROCHLORIDE 5 ML: 10 INJECTION, SOLUTION INFILTRATION; PERINEURAL at 13:30

## 2017-06-26 RX ADMIN — ERTAPENEM SODIUM 1 G: 1 INJECTION, POWDER, LYOPHILIZED, FOR SOLUTION INTRAMUSCULAR; INTRAVENOUS at 13:05

## 2017-06-26 RX ADMIN — Medication 25000 UNITS: at 08:33

## 2017-06-26 RX ADMIN — SODIUM CHLORIDE, PRESERVATIVE FREE 5 ML: 5 INJECTION INTRAVENOUS at 10:26

## 2017-06-26 RX ADMIN — TRIAMCINOLONE ACETONIDE 1 G: 1 OINTMENT TOPICAL at 08:49

## 2017-06-26 RX ADMIN — TRIAMCINOLONE ACETONIDE: 1 OINTMENT TOPICAL at 22:56

## 2017-06-26 RX ADMIN — GABAPENTIN 200 MG: 100 CAPSULE ORAL at 21:07

## 2017-06-26 RX ADMIN — DIGOXIN 125 MCG: 125 TABLET ORAL at 08:34

## 2017-06-26 RX ADMIN — SODIUM CHLORIDE, PRESERVATIVE FREE 5 ML: 5 INJECTION INTRAVENOUS at 10:12

## 2017-06-26 RX ADMIN — CALCIUM CARBONATE 600 MG (1,500 MG)-VITAMIN D3 400 UNIT TABLET 2 TABLET: at 08:33

## 2017-06-26 RX ADMIN — FOLIC ACID 1 MG: 1 TABLET ORAL at 08:34

## 2017-06-26 RX ADMIN — Medication 220 MG: at 08:33

## 2017-06-26 RX ADMIN — APIXABAN 2.5 MG: 2.5 TABLET, FILM COATED ORAL at 08:33

## 2017-06-26 RX ADMIN — Medication 2 G: at 13:18

## 2017-06-26 RX ADMIN — PANTOPRAZOLE SODIUM 40 MG: 40 TABLET, DELAYED RELEASE ORAL at 08:33

## 2017-06-26 NOTE — PROCEDURES
PRELIMINARY REPORT:     PROCEDURE: Right heart catheterization    PHYSICIANS:  Attending Cardiology Staff: Lee Samuels MD  Cardiology Fellow: Charly Hamilton MD     HPI:  Amelia Michel is a 56 year old female with history including recent cardiac arrest with prolonged hospital course now with hypotension who presents on an hemodynamic assessment to evaluate hypotension.     DESCRIPTION:   Venous Location: right internal jugular vein   Access: Local anesthetic with lidocaine.  A micropuncture (21 g) needle with ultrasound guidance was used to establish venous access.  Venous Sheath:7F standard sheath   Catheters:  7F [place] Coby PA catheter    Right Heart Catheterization:  BP 88/67/74    BSA 1.9    RA 14/14/12   RV 37/14  PA  36/23/27  PCW 17/18/15   Prabha CO 4.3   Prabha CI 2.4   TD CO 4.0   TD CI 2.2   PA sat 53.0%   Hgb 7.2 g/dL   PVR 2.8  TPR 6.2    COMPLICATIONS:   None    IMPRESSION:   1.Increased right-sided and increased left-sided filling pressures.   2. Mild pulmonary hypertension with a mean pulmonary artery pressure of 27 mmHg.  3. Decreased cardiac output (4.3 L/min) and cardiac index (2.4)    PLAN:   1. Continued management of heart failure and hypotension per inpatient CARDS I team.  2. Continued medical management for cardiovascular risk factor optimization.    Findings discussed with Cards I fellow Dr. Og.    See CVIS report for final draft.      Charly Hamilton MD  Cardiovascular Disease Fellow  [phone]

## 2017-06-26 NOTE — PLAN OF CARE
Problem: Goal Outcome Summary  Goal: Goal Outcome Summary  OT/6C:  Pt not feeling well this AM, will check back in PM as schedule permits and as appropriate.

## 2017-06-26 NOTE — PROGRESS NOTES
CLINICAL NUTRITION SERVICES     Nutrition Prescription    Recommendations already ordered by Registered Dietitian (RD):  Kcal counts    Future/Additional Recommendations:  For all recommendations, see prior nutrition note.     Kcal counts:  6/20   765 kcals and 56 g protein (meal/s and 100% one packet Beneprotein supplement/s recorded)  6/21   628 kcals and 44 g protein (meal/s and 100% Ensure Clear and one packet Beneprotein supplement/s recorded)  6/22   421 kcals and 29 g protein (on meal/s and no supplement/s recorded)  *  Pt consumed a three-day average of 605 kcals and 43 g protein daily. Not meeting estimated needs of 5971-0615 kcals/day (25 - 30 kcals/kg) and 61-77 grams protein/day (1.2 - 1.5 grams of pro/kg) via oral intake soley. However, meeting nutrition needs when accounting for oral intake in combination with TFs when infused per current TF regimen. Peptamen 1.5 TFs at 40 mL/hr x 12 hours provides 720 kcals and 33 g protein.     INTERVENTIONS:  Implementation:  Reordered kcal counts 6/26-6/28    Follow up/Monitoring:  Will continue to follow pt.    Inés Brown, MS, RD, LD, CNSC   6C Pgr:  [phone]

## 2017-06-26 NOTE — PROGRESS NOTES
Social Work Services Progress Note    Hospital Day: 8  Date of Initial Social Work Evaluation:  6/20/17  Collaborated with:  Cards 1 team    Data:  Pt is a 56 year old female who was transferred from  ARU to .  Pt was then transferred to 5th floor medicine team.  Pt is now a return to  and Cards 1 team for further cardiac work up.      Intervention:  SW continuing to follow pt for discharge back to  ARU.  Pt not medically ready today.  Likely to transfer to ICU for catheterization procedure.  Will follow if pt returns to  today.  [facility] ARU admissions updated on pt's progress and are following her chart.    Assessment:   involved.  Per medicine team,  was observed as more frustrated due to pt's prolonged medical course.  SW will try to see pt/spouse today to offer support.    Plan:    Anticipated Disposition:  Facility:  Return to  ARU as able.    Barriers to d/c plan:  Medical stability    Follow Up:  SW to follow up with pt/spouse and [facility] ARU admissions as needed.  SW will be following for discharge planning.    DENNIS Larsen, SETHW  6C Unit   Phone: [phone]  Pager: [phone]  Unit: [phone]      ___________________________________________________________________________________________________________________________________________________    Referrals in process:   [facility] ARU - return when medically stable.     Referrals Discontinued:  None    Community Case Management/Community Services in place:   None

## 2017-06-26 NOTE — PROGRESS NOTES
[facility] ID SERVICE: COURTESY NOTE   Amelia Michel : 1960 Sex: female:   Medical record number [id number] Attending Physician: Johnie Chino MD  Date of Service: 2017    DISCUSSION:   # Left Upper extremity wound and soft tissue infection- cellulitis     Amelia Michel is a 56 year old woman with hx of VSD repair (), Rheumatoid arthritis, and a recent diagnosis of Afib/Aflutter/SVT, who presented to [facility] on  after Out of Hospital cardiac arrest.  After ROSC in the field, she was admitted from -6/15, extubated . While at ARU (6/15-), pt developed worsening LUE wound pain and fever, and was admitted back to [facility] on 17 for further workup.      The patient first developed the LUE wound in her previous admission on . It is unclear as to what incited the wound but is thought to be secondary to IV extravasation. On chart review, it seems that she initially developed blisters and sloughed off epidermis around multiple old IV site bruises and edema up to the shoulder. 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 No DVTs. Vascular surgery consult at that time showed no indication for surgical intervention.  She received Vancomycin (-), meropenem (-6/3) and ertapenem(6/3-) during that hospitalization for Aspiration PNA and ESBL UTI.     Following her discharge, the patient experienced worsening of the wound pain and had fevers which prompted admission. Wound culture on 17 grew Enterobacter cloacae complex, Enterococcus faecalis and coagulase negative staphylococcus. On repeat examination, the wound does show signs of inflammation c/w cellulitis though unclear if it is chemical or infectious.  The wound has been receding as per the patient since this admission, suggesting that either more effective wound cares or antibiotics or both may be  improving the wound. In agreement with AMT, Bactrim is reasonable to target the Enterobacter in the wound and would consider transitioning from ertapenem.    RECOMMENDATIONS:   1. Recommend dropping ertapenem for Bactrim 1 double strength tablet q12h for ongoing treatment of LUE wound (no need for PICC for antibiotic treatment)    Unable to see today due to prep for catheterization. ID will continue to follow.      MEME Chow M.D.  [facility] ID Service Staff  [phone]

## 2017-06-26 NOTE — PROGRESS NOTES
[facility], [place]   Hospitalist Daily Progress Note                                                 Date of Admission:2017  ___________________________________  INTERVAL HISTORY (24 Hrs)/SUBJECTIVE:   Last 24 hr events, care team notes reviewed.     Patient again with lactic acidosis,worsening anasarca, plus ongoing afib/ flutter w rvr  Reports worening sob, and tachypnea today am. BP remains low, however MAP mostly  >65  Gen weakness, anxious.   No chest pain or LH or dizziness.   No NV or pain abdomen.   abd distention likely 2/2 ? Ascites.   No fever or chills     Wolf at bedside, very concerned about her overall condition.      ROS: 4 point ROS neg other than the symptoms noted above in the interval history.    OBJECTIVE :   VITALS:    Temp:  [97.4  F (36.3  C)-98.2  F (36.8  C)] 97.6  F (36.4  C)  Heart Rate:  [114-126] 118  Resp:  [18-28] 28  BP: (80-99)/(56-74) 84/70  SpO2:  [94 %-100 %] 96 %    Temp (24hrs), Av.7  F (36.5  C), Min:97.4  F (36.3  C), Max:98.2  F (36.8  C)        Wt Readings from Last 5 Encounters:   17 86.2 kg (190 lb)   17 75.7 kg (166 lb 12.8 oz)   17 72.8 kg (160 lb 8 oz)   17 64.4 kg (141 lb 15.6 oz)   17 61.6 kg (135 lb 12.8 oz)          PHYSICAL EXAM:  General: sitting on the bed, tachypneic, mod respi distress. Anxious.  alert, interactive, Nasal feeding tube.   HEENT: AT/NC,  Moist MM  Neck: Supple, difficult to appreciate JVD. No LNpathy  Respi/Chest: mild to mod labored breathing. Chest clear, diminished at bases. Poor effort.    CVS/Heart: S1S2 irregular. Tachy. Life vest+     Gi/Abd: Soft, non tender, distended.   MSK/Ext: distally warm, well perfused.  L UE wound: cellulitis: improving. On dressing- dry. Anasarca. Both LE edema on ace wrap.   Neuro: AO x 4, no focal deficit  Psychiatry: very pleasant.      Medications:   I have reviewed this patient's current medications.    Data:   All laboratory and  imaging data in the past 24 hours reviewed:    LABS:  CMP    Recent Labs  Lab 06/26/17  0647 06/25/17  0710 06/24/17  2221 06/24/17  0623 06/23/17  1358    133 132*  --  131*   POTASSIUM 4.8 4.6 4.9 4.9 5.4*   CHLORIDE 106 106 106  --  106   CO2 17* 18* 18*  --  20   ANIONGAP 10 10 8  --  5   * 143* 151*  --  154*   BUN 42* 47* 47*  --  39*   CR 0.72 0.85 0.85  --  0.78   GFRESTIMATED 84 69 69  --  76   GFRESTBLACK >90African American GFR Calc 83 84  --  >90African American GFR Calc   VIKTORIYA 7.5* 7.3* 7.1*  --  7.2*   MAG 2.1 2.4* 1.9 1.9  --    PHOS  --   --  3.4  --   --      Corrected Ca:~ 9.0 6/22/2017      CBC    Recent Labs  Lab 06/26/17  0647 06/25/17  0710 06/24/17 2221 06/23/17  1358   WBC 3.2* 3.3* 4.0 3.4*   RBC 2.79* 2.56* 2.58* 2.62*   HGB 8.8* 8.3* 8.4* 8.1*   HCT 26.3* 23.9* 24.3* 24.8*   MCV 94 93 94 95   MCH 31.5 32.4 32.6 30.9   MCHC 33.5 34.7 34.6 32.7   RDW 20.9* 20.7* 20.4* 20.8*   PLT 25* 30* 21* 27*     INR    Recent Labs  Lab 06/22/17  0653 06/21/17  0623 06/20/17  0709 06/19/17  1612   INR 1.33* 1.42* 1.29* 1.44*     Unresulted Labs Ordered in the Past 30 Days of this Admission     Date and Time Order Name Status Description    6/26/2017 0935 Blood culture In process     6/19/2017 0725 Anaerobic bacterial culture Preliminary     5/29/2017 1046 Adalimumab Activity and Neutralizing Antibodies: Laboratory Miscellaneous Order In process           Echo: 06/05:     Limited, technically difficult study. Extremely difficult acoustic windows  despite the use of contrast for endcardial border definition.  Borderline (EF 50-55%) reduced left ventricular function is present.  Additionally, the abnormal non-specific septal motion and irregular cardiac  cycle contribute to the difficulty in accurately measuring LVEF.  The IVC is dilated at 2.5 cm, c/w increased RAP. The RAP is in excess of 15  mmHg.    ASSESSMENT & PLAN :    Amelia Michel is a 56 year old woman with hx of VSD repair (1969),  Rheumatoid arthritis, and a recent diagnosis of Afib/Aflutter/SVT, who presented to [facility] on 5/29 after Out of Hospital cardiac arrest with shockable rhythm. After ROSC in the field, she was admitted from 5/29-6/15, extubated 6/4. While at ARU (6/15-6/19), pt developed worsening LUE wound pain and fever, and was admitted back to [facility] for further workup. Admitted 6/19/2017         Changes Today:    6/26/2017  Patient w/recurrent lactic acidosis 2.8. Ongoing afib w rvr 120s. Low bp (map ~67)- asymptomatic. Worsening hypoxia, tachypnea. Worsening anasarca. 20 plus lb wt gain since admission. Extremely challenging to try to diurese with low, soft bp.     Unlikely lactic acidosis from sepsis - as remains afebrile. Procal trending down. Cellulitis: improving. Most likely related to intravascular hypovolemia in setting of afib w rvr. Heart failure (s/p recent cardiac arrest). Poor po intake.     Reviewed fu CXR. Cardiology note from yesterday.     Given above, patient likely will need diuresis in very closely monitored setting even possibly [facility]. Feel she would be better served under cardiology team given her issues appears mostly cardiac related at this point.     Talked to Cardiology team. Dr Monroe kindly agreed to transfer care under cardiology team.       Afib/flutter w RVR: On Digoxin 125 mcg per cardiology. Digoxin level from 6/26/2017: 0.7     # From ID stand point: on iv Ertapenem, Continue. Further per ID, Ct wound care L UE>      - (6/25/2017  Hypotension, lactic acidosis:  Improved with small fluid 250 ml nss bolus plus albumin. Given another 500 ml nss bolus plus 12.5 gm albumin 06/25 am--> MAP >65 at current.)     - PT/OT: activity as tolerated.  - malnutrition: Encourage oral intake. Ct Tube feeding.                 # Sepsis likely from L UE wound infection. On adm: SIRS +  (Fever, leukopenia, tachcyardia)   - Hemodynamics stable.   Improving.   Lactic acidosis: resolved w/ ivf    C.diff neg x 2   Enteric  panel, stool: neg.   UC: mixed urogenital harshal.   WC:   Heavy growth Enterobacter cloacae complex   Moderate growth Enterococcus faecalis   Moderate growth Coagulase negative Staphylococcus   susceptibility reviewed.  BC: no growth so far.     Left UE Wound with concern for cellulitis +/- myositis with potential area of myonecrosis:    L UE wound onset around time of cardiac arrest, unclear etiology (?trauma during code), exacerbated by fluid extravasation from IV during last admission. Over course of rehab, pt has noticed increasing pain in L UE with therapy and spiked fevers to 101F and 102F on 6/18 and 6/19, respectively. On admission, wound appears erythematous, with eschar and fibrinous material overlying wound (no purulent drainage), tender to palpation.    6/19 L Elbow XR:  Negative for osteo, diffuse subcutaneous soft tissue stranding, possibly representative of cellulitis   6/19 L Elbow MRI: Diffuse cellulitis and multicompartment soft tissue tissue edema within the muscles about the left elbow, with a focal area of nonenhancement,  centered in the brachialis muscle. Differential includes myositis,including infectious or inflammatory etiology. A focal area of myonecrosis is likely within the brachialis muscle. Correlate clinically.    --Started empirically on iv Vancomycin/Ertapenem and added levofloxacin 6/20 for pseudomonal coverage  --General Surgery: 06/19: no surgical indication.   --Pain control: Tramadol 25mg q6h prn, Continue morphine gel around edges of wound for pain   --WOCN on board. Ct wound care.     -- 6/21/2017: ID consulted. Dc iv levaquin.    -- 6/24/2017: DC iv Vanco  -- 6/25/2017: Continue iv Ertapenem.   --Ct wound care    *Further Per ID      # Atrial fibrillation with RVR:      Known Afib w/ RVR from prior hospitalization w/ rates difficult to control despite being on Metoprolol 100 mg XL and Diltiazem. She had 1 DCCV for which she reverted within 24hrs. Sotolol was also attempted  but patient did not tolerate the medication due to nausea and vomiting and subsequently failed Metoprolol and Propranolol for the same issue. She was discharged during this previous hospitalization on Amiodarone and was also started on Apixaban. Patient had no evidence of ventriclar arrhythmia prior to discharge. RFA was discuss but was defered as patient had a UTI at the time with ESBL. Given difficulty to control rates, an CMR was performed to r/o inflammation or scar as cause of arrhythmia. CMR did not show atrial inflammation or abnormalities except for enlargement. 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   Appreciate cardiology consult.   Dc Digoxin per cardiology. Level high 06/20: 3.4.     - Continue Atenolol 50 mg daily. Dc atenolol given low bp 06/24 overnight.   6/25/2017: per cardiology, ordered digoxin 62.5 mcg daily  - fu K, mg, replete per protocol.   - Optimize vol status as able. Lasix 20 mg po x 1 6/24/2017  - Limited Echo for IVC _ normal ivc in limited view.   - Consider formal EP consult if rates are consistently >120-130 or if patient is symptomatic     Other issues:       # Out of Hospital Cardiac Arrest and Cardiogenic Shock:   - ICD will be deferred at this time in lieu of LUE extravasation/high risk of infection. 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 Plts noted to decrease from 104 to as low as 15 during recent hospitalization requiring transfusion w/ chronically low in the 's as outpatient.  -- If this is chronic ITP, no need for treatment unless plts drift < 30s  -- Continue to hold RA meds for now  -- Continue Apixaban 2.5 mg BID for anticoagulation. This will need to be held 2 days prior to  ICD placement after discharge (this will be deferred given acute infection).        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 Now on 10 mg daily, taper to 7.5 mg on 6/29, then to 5 mg daily after 2 weeks if continues to have low disease activity  -- Continue to hold HCQ and MTX until outpatient follow-up  -- Follow-up with [facility]ology clinic Dr. Conor Cunha in 4-6 weeks after discharge      # Severe Critical Illness Myopathy:   Significant deconditioning w/ median neuropathies most likely localized to the wrists and ulnar neuropathies most likely localized to the elbows secondary to RA.  -- Ongoing extensive PT/OT/SLP     # ESBL UTI: Completed course abx.      # Malnutrition:     -- Nutrition recommendations are to continue Peptamen 1.5 Tube feeds to 40 ml/hr x 11 hours which will provide 440 ml TF, 660 kcals, and 30 g protein daily. TFs may be adjusted pending oral intake. Once kcal counts reveal that patient is consuming at least 900 kcals and 45 g protein daily on average can discontinue.  -- Water flushes as needed.  -- Nutrition on board.      Consulting Services: Cardiology, Infectious disease. PMR.      CODE: Full  DVT Ppx: Apixaban  Diet/Fluids: Cycled tube feeds, calorie counts,  nutrition following  6/22/2017: per SLP: regular w thin.     - Electrolyte Protocol:  Yes  - Telemetry: Yes; Life Vest needs to be worn at all times    Dispo: Transfer care to Cardiology service. (as discussed above).   Medicine will sign off . Please page with any Q  Signed out to Cardiology NP  Further dispo plan per Cardiology team.     Patient and  at bedside updated about the plan.   Evon RN    Pt's care was discussed Sw, CC in care rounds.        Johnie Chino MD   Hospitalist (Internal Medicine)  Pager:  [phone].

## 2017-06-26 NOTE — PROGRESS NOTES
Cardiology Daily Note   Date of Service: 6/26/2017  Patient: Amelia Michel  MRN: [id number]  Admission Date: 6/19/2017  Hospital Day # 7    Assessment & Plan:   Amelia Michel is a 56 year old female with PMHx of VSD s/p repari (1969), RA, recently diagnosed Atrial fibrillation that was complicated with an OOH cardiac arrest (felt 2/2 long pause with degeneration to VFib while converting Afib to SR with multiple AV prieto agents) who was readmitted from ARU on 6/19 with fever and concern for worsening LUE infection. She is now transferred back to cardiology service as primary on 6/26 for further workup and evaluation of recurrent lactic acidosis, persistent afib and softer blood pressures with tachypnea.        Changes Today:  - Cardiology to resume as primary service  - Hold apixaban 6/26 for RHC  - Transfuse platelets x2  - RHC this afternoon to assess volume  - Albumin PRN hypotension, trend lactate until normal  - Daily weights, Strict I/Os  - Pan culture with BCx2, UA/UCx  - PICC to be placed given soft blood pressures     # Volume overload/anasarca:  # HFrEF (EF 50-55%):  # Hypotension:  Patient is up approximately 35lbs from 6/13/17 (157lbs -> 190lbs) over the coarse of her complicated hospitalization for OOH arrest, subsequent visit at the ARU and now readmission for evaluation of possible LUE infection and sepsis. She is severely malnourished and has an albumin of 1.9 which would suggest third spacing of the majority of her fluid making her a challenging candidate for diuresis. Over the past 24 -48 hours she has had softer blood pressures with systolics 80-90s. Suspect she is total body volume up and intravascularly deplete.  - Right heart cath today   - Hold apixaban, 1u plts  - Pending above will consider IV diuretics/gtt vs. UF  - Cardiology heart failure consult, appreciate recs    # Atrial fibrillation/flutter with RVR:  RVR in setting of volume overload above.  - Continue dig 125mcg  -  Continue atenolol 50mg daily  - Management of volume overload as above  - Hold apixaban 6/26, resume 6/27 as appropriate    # Out of hospital arrest:  - Pt will need to wear LifeVest until medically appropriate and cleared from infection/wound standpoint for ICD implantation likely 4-6 weeks after discharge per EP (pending hospital course for LUE wound)    # LUE wound and soft tissue infection:  Most consistent with cellulitis. Wound culture on 6/19 grew Enterobacter cloacae complex, Enterococcus faecalis and coagulase negative staphylococcus.  - ID following appreciate recs   - Rec to DC ertapenem and start bactrim 1DS q12h for LUE wound (will defer until assessment of SVR with RHC given concern lactic acidosis may be related to sepsis of other unclear source)  - 6/19 L Elbow XR:  Negative for osteo, diffuse subcutaneous soft tissue stranding, possibly representative of cellulitis   - 6/19 L Elbow MRI: Diffuse cellulitis and multicompartment soft tissue tissue edema within the muscles about the left elbow, with a focal area of nonenhancement,  centered in the brachialis muscle. Differential includes myositis,including infectious or inflammatory etiology. A focal area of myonecrosis is likely within the brachialis muscle. Correlate clinically.  --General Surgery: 06/19: no surgical indication.   --Pain control: Tramadol 25mg q6h prn, Continue morphine gel around edges of wound for pain   - WOCN    # Lactic acidosis:  # SIRS (Tachycardia, leukopenia):  Patient with recurrent lactic acidosis, last 2.8 -> 2.3 (mildly elevated). DDx sepsis, cardiogenic shock (patient work, EF only mildly reduced), no e/o limb ischemia, DKA, or seizure. She has been afebrile, her procal is elevated but improving. Her CXR does have basilar opacities with question for possible PNA which may be a potential source. Blood cultures with no growth.  - Pan culture 6/26   - BCx (6/26): In process   - UA/UCx:   - Enteric panel negative  - Wound  culture (left upper extremity, 6/19): Heavy growth Enterobacter cloacae complex, Moderate growth Enterococcus faecalis, Moderate growth Coagulase negative Staphylococcus       # Acute on Chronic Thrombocytopenia:   2/2 hyporoliferative bone marrow. Plts noted to decrease from 104 to as low as 15 during recent hospitalization requiring transfusion w/ chronically low in the 's as outpatient.  -- If this is chronic ITP, no need for treatment unless plts drift < 30s Transfused 1u plts 6/26 for plts 25  -- Continue to hold RA meds for now  -- Continue Apixaban 2.5 mg BID for anticoagulation. This will need to be held 2 days prior to ICD placement after discharge (this will be deferred given acute infection).      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 Now on 10 mg daily, taper to 7.5 mg on 6/29, then to 5 mg daily after 2 weeks if continues to have low disease activity  -- Continue to hold HCQ and MTX until outpatient follow-up  -- Follow-up with [facility] Dr. Conor Cunha in 4-6 weeks after discharge      # Severe Critical Illness Myopathy:   Significant deconditioning w/ median neuropathies most likely localized to the wrists and ulnar neuropathies most likely localized to the elbows secondary to RA.  -- Ongoing extensive PT/OT/SLP     # ESBL UTI: Completed course abx.      # Malnutrition:   -- Nutrition recommendations are to continue Peptamen 1.5 Tube feeds to 40 ml/hr x 11 hours which will provide 440 ml TF, 660 kcals, and 30 g protein daily. TFs may be adjusted pending oral intake. Once kcal counts reveal that patient is consuming at least 900 kcals and 45 g protein daily on average can discontinue.  -- Water flushes as needed.  -- Nutrition on board.     Consulting Services: Nutrition, ID,  "heart failure service    CODE: Full Code  DVT Ppx: Apixaban (hold 6/26)  Diet/Fluids: As above.  Disposition: Pending improvement in above.    Pt's care was discussed with bedside RN and patient during Care Team Rounds.    Patient was discussed and evaluated with Dr. Trent.    Evie Longo MD PGY2  Internal Medicine Resident  Pager: [phone]    ___________________________________________________________________    Subjective & Interval History:     Last 24 hr care team notes reviewed. Transferred to cardiology today. Does not feel that her breathing is significantly bothersome.    Medications: Reviewed in EPIC. List below for reference    Physical Exam:    Blood pressure 90/74, pulse 120, temperature 97.7  F (36.5  C), temperature source Oral, resp. rate 28, height 1.48 m (4' 10.25\"), weight 86.2 kg (190 lb), SpO2 98 %, not currently breastfeeding.    CONSTITUTIONAL: Ms. Michel is lying down in bed, she appears mildly tachypneic but no increased work of breathing exam.  HEENT:  NC/AT.  OP Clear.  MMM. PERRLA.  EOMI.   NECK: Supple, no cervical LAD, JVD to the angle of the jaw while lying thirty degrees.  LUNGS: Tachypneic, no increased work of breathing.  CARDIOVASCULAR: Irregularly irregular w/ no M/R/G.   ABDOMEN: Soft, ND, NT, BS +ve.   MUSCULOSKELETAL:  Moves all four extremities without difficulty.  DP pulses intact.  Diffuse anasarca.  NEURO: A&Ox3, CN II-XII grossly intact, non-focal.   PSYCHIATRIC:  Normal affect.  SKIN: Warm, Dry, No rashes.     Lines/Tubes:   PICC Triple Lumen 06/26/17 Right Basilic ok to use. (Active)   Site Assessment WDL 6/26/2017  1:29 PM   External Cath Length (cm) 3 cm 6/26/2017  1:29 PM   Extremity Circumference (cm) 35 cm 6/26/2017  1:29 PM   Dressing Intervention Chlorhexidine patch;Transparent;Securing device;New dressing 6/26/2017  1:29 PM   Dressing Change Due 07/03/17 6/26/2017  1:29 PM   PICC Lumen Assessment Trigger Red;White;Gray 6/26/2017  1:29 PM   Number of days:0 "       Labs & Studies of Note: Reviewed in Epic  CMP  Recent Labs  Lab 06/26/17  0647 06/25/17  0710 06/24/17  2221 06/24/17  0623 06/23/17  1358    133 132*  --  131*   POTASSIUM 4.8 4.6 4.9 4.9 5.4*   CHLORIDE 106 106 106  --  106   CO2 17* 18* 18*  --  20   ANIONGAP 10 10 8  --  5   * 143* 151*  --  154*   BUN 42* 47* 47*  --  39*   CR 0.72 0.85 0.85  --  0.78   GFRESTIMATED 84 69 69  --  76   GFRESTBLACK >90African American GFR Calc 83 84  --  >90African American GFR Calc   VIKTORIYA 7.5* 7.3* 7.1*  --  7.2*   MAG 2.1 2.4* 1.9 1.9  --    PHOS  --   --  3.4  --   --        CBC  Recent Labs  Lab 06/26/17  0647 06/25/17  0710 06/24/17  2221 06/23/17  1358   WBC 3.2* 3.3* 4.0 3.4*   RBC 2.79* 2.56* 2.58* 2.62*   HGB 8.8* 8.3* 8.4* 8.1*   HCT 26.3* 23.9* 24.3* 24.8*   PLT 25* 30* 21* 27*       INR  Recent Labs  Lab 06/22/17  0653 06/21/17  0623 06/20/17  0709   INR 1.33* 1.42* 1.29*       Unresulted Labs Ordered in the Past 30 Days of this Admission     Date and Time Order Name Status Description    6/26/2017 0935 Urine Culture Aerobic Bacterial Preliminary     6/26/2017 0935 Blood culture Preliminary     6/26/2017 0935 Blood culture Preliminary     5/29/2017 1046 Adalimumab Activity and Neutralizing Antibodies: Laboratory Miscellaneous Order In process           Medication List for Reference (delete if desired)  Current Facility-Administered Medications   Medication     lidocaine (LMX4) kit     heparin lock flush 10 UNIT/ML injection 2-5 mL     digoxin (LANOXIN) tablet 125 mcg     ondansetron (ZOFRAN) tablet 4 mg     prochlorperazine (COMPAZINE) injection 5-10 mg     ondansetron (ZOFRAN) injection 4 mg     pantoprazole (PROTONIX) EC tablet 40 mg     simethicone (MYLICON) chewable tablet 80 mg     sodium chloride (PF) 0.9% PF flush 10-20 mL     sodium chloride (PF) 0.9% PF flush 10 mL     heparin lock flush 10 UNIT/ML injection 5-10 mL     heparin lock flush 10 UNIT/ML injection 5-10 mL     dextrose 10 % 1,000  mL infusion     heparin lock flush 10 UNIT/ML injection 2-5 mL     ascorbic acid (VITAMIN C) tablet 500 mg     atenolol (TENORMIN) tablet 50 mg     beta carotene capsule 25,000 Units     cetirizine (zyrTEC) tablet 10 mg     ertapenem (INVanz) 1 g vial to attach to  mL bag     folic acid (FOLVITE) tablet 1 mg     gabapentin (NEURONTIN) capsule 200 mg     melatonin tablet 3 mg     morphine 0.1% in solosite topical gel 2 g     multivitamin, therapeutic with minerals (THERA-VIT-M) tablet 1 tablet     triamcinolone (KENALOG) 0.1 % ointment     zinc sulfate (ZINCATE) capsule 220 mg     lidocaine 1 % 1 mL     lidocaine (LMX4) kit     sodium chloride (PF) 0.9% PF flush 3 mL     sodium chloride (PF) 0.9% PF flush 3 mL     medication instruction     acetaminophen (TYLENOL) tablet 650 mg     acetaminophen (TYLENOL) Suppository 650 mg     Patient is already receiving anticoagulation with heparin, enoxaparin (LOVENOX), warfarin (COUMADIN)  or other anticoagulant medication     glucose 40 % gel 15-30 g    Or     dextrose 50 % injection 25-50 mL    Or     glucagon injection 1 mg     insulin aspart (NovoLOG) inj (RAPID ACTING)     insulin aspart (NovoLOG) inj (RAPID ACTING)     naloxone (NARCAN) injection 0.1-0.4 mg     calcium-vitamin D (CALTRATE) 600-400 MG-UNIT per tablet 2 tablet     co-enzyme Q-10 capsule 30 mg     morphine 0.1% in solosite topical gel     potassium chloride SA (K-DUR/KLOR-CON M) CR tablet 20-40 mEq     potassium chloride (KLOR-CON) Packet 20-40 mEq     potassium chloride 10 mEq in 100 mL intermittent infusion     potassium chloride 10 mEq in 100 mL intermittent infusion with 10 mg lidocaine     potassium chloride 20 mEq in 50 mL intermittent infusion     magnesium sulfate 2 g in NS intermittent infusion (PharMEDium or FV Cmpd)     magnesium sulfate 4 g in 100 mL sterile water (premade)     sodium phosphate 10 mmol in D5W intermittent infusion     sodium phosphate 15 mmol in D5W intermittent infusion      sodium phosphate 20 mmol in D5W intermittent infusion     sodium phosphate 25 mmol in D5W intermittent infusion     melatonin tablet 1 mg     traMADol (ULTRAM) half-tab 25 mg     predniSONE (DELTASONE) tablet 10 mg     Echo: 06/05     Limited, technically difficult study. Extremely difficult acoustic windows  despite the use of contrast for endcardial border definition.  Borderline (EF 50-55%) reduced left ventricular function is present.  Additionally, the abnormal non-specific septal motion and irregular cardiac  cycle contribute to the difficulty in accurately measuring LVEF.  The IVC is dilated at 2.5 cm, c/w increased RAP. The RAP is in excess of 15  mmHg.    CXR:  IMPRESSION:   1. New right upper extremity PICC tip projects over approximately the  superior cavoatrial junction. Otherwise stable positioning of support  devices as above.  2. Increased right pleural effusion.  3. Stable left-sided hazy opacities concerning for infection.    I very much appreciated the opportunity to see and assess Mrs Amelia Michel in the hospital with CV Fellow Dr Og and Cards 1 resident Dr Longo. I spent 30 min with the patient and  discussing problems obtaining adequate diuresis which currently is limited in part by hypoproteinemia. I also requested that the HF service assist in her care.    I agree with the note above which summarizes my findings and current recommendations.  Please do not hesitate to contact my office if you have any questions or concerns.      Pj Trent MD  Cardiac Arrhythmia Service  [facility]  [phone]

## 2017-06-26 NOTE — PLAN OF CARE
Problem: Goal Outcome Summary  Goal: Goal Outcome Summary  SLP: Cx session due to patient at procedure for duration of afternoon.

## 2017-06-26 NOTE — PLAN OF CARE
Problem: Goal Outcome Summary  Goal: Goal Outcome Summary  Outcome: No Change  D: Pt admitted 6/19 with sepsis 2/2 L arm cellulitis (from extravasation). Recent hospitalization after OOH cardiac arrest.  I/A: Pt continues with significant (and apparently worsening) generalized edema- lymph wraps on BLE, all extremities elevated. Continues with urinary & fecal incontinence- incontinence cares done, barrier cream applied to reddened buttocks. Turned/repositioned pt as tolerated. Sats stable on room air but appears to have more labored breathing and slower recovery with activity. Placed pt on 2L O2. Paged [facility] cross cover Dr Blair this AM with report of increasing edema, increased breathing workload, increased HR (A flutter 120's), and new report of lightheadedness/dizziness. BP stable. Per Dr Blair, will CTM. Sepsis protocol triggered- Lactic acid 2.8. Paged cross cover again with result- he will pass along to primary. TF infusing at 40ml/hr (6p-6a). Lifevest on, spare batteries charged. BG monitored. Pt resting between cares overnight.   P: Continue to monitor and assess pt condition and contact treatment team with questions or concerns.

## 2017-06-26 NOTE — PROGRESS NOTES
SPIRITUAL HEALTH SERVICES  SPIRITUAL ASSESSMENT Progress Note  [facility]     REFERRAL SOURCE: Follow up    Visited with Amelia and her  Wolf. Shared briefly the change in her health and wanting answers about why she is retaining so much fluid (60lbs). Hopeful that the tests the doctors are doing will give answers.  requested a prayer and then time for patient to sleep. Shared a prayer with patient and .     PLAN: Let patient and  know that I will follow up as she stays on unit; let them know there are also chaplains on the ICU, where she might be transferred.    Caitie Colón   Intern  Pager [phone]

## 2017-06-26 NOTE — CONSULTS
Cardiology Consult      Patient Name: Amelia Michel MRN# [id number]   Age: 56 year old YOB: 1960     Date of Admission:6/19/2017             Assessment and Plan:   56 year old lady with advanced RA (on prednisone, MTX, plaquenil), HTN, Obesity, New Afib RVR presentation 5/15-5/19- failed DCCV, didn't tolerated sotalol, discharged on BB and amiodarone, Out of hospital arrest from shock able rhythm with ROSC hospitalzation 5/29 to 6/15, normal CORS 5/29 c/b respiratory failure requiring mechanical ventilation, bone marrow failure, renal failure, shock liver, ESBL E. Coli UTI, malnutrition, discharged on Lifevest, admitted on 6/19 for increasing left upper arm wound and fever.    # Anasarca- from 3rd spacing from low albumin and some component of intravascular hypervolemia  # Mild LV dysfunction EF 40-45%-query tachycardia mediated  # Afib/aflutter with RVR -120s  # Hypotension issues recently with rate controlling agents, diuretics etc.  # LUE wound pain/fever reason for hospitalization this time 6/19    # New Afib RVR presentation 5/15-5/19- failed DCCV, didn't tolerated sotalol, discharged on BB and amiodarone  # Out of hospital arrest from shock able rhythm with ROSC hospitalzation 5/29 to 6/15, normal CORS 5/29 c/b respiratory failure requiring mechanical ventilation, bone marrow failure, renal failure, shock liver, ESBL E. Coli UTI, malnutrition, discharged on Lifevest,      Recommendations  -consider giving lasix with albumin to mobilize fluid from interstitial place  -optimize nutritional status as able    staff with Dr. Zack Prieto  General Cardiology Fellow, PGY4  Pager [phone]             Chief Complaint:   Volume management           HPI:   56 year old lady with advanced RA (on prednisone, MTX, plaquenil), HTN, Obesity, New Afib RVR presentation 5/15-5/19- failed DCCV, didn't tolerated sotalol, discharged on BB and amiodarone, Out of hospital arrest from shock able rhythm  with ROSC hospitalzation 5/29 to 6/15, normal CORS 5/29 c/b respiratory failure requiring mechanical ventilation, bone marrow failure, renal failure, shock liver, ESBL E. Coli UTI, malnutrition, discharged on Lifevest, admitted on 6/19 for increasing left upper arm wound and fever.  Here she has had Afib/aflutter rvr, hypotension, diarrhea. She is deconditioned. She denies chest pain, orthopnea, palpitations, presyncope.  HR 120s SBP 80-90/60 2L NC lactate mildly elevated. Pancytopenia plt 25 normal Cr  ECG aflutter vs atrial tachycardia. ECG from 5/29 shows atrial paced v senese rhythm with prolonged QTc (she had temporary pacer at the time) CXR small right pleural effusion  Cardiac MRI 5/15 normal LV and RV EF           Past Medical History:     Past Medical History:   Diagnosis Date     Hypertension      Rheumatoid arthritis(714.0)               Past Surgical History:     Past Surgical History:   Procedure Laterality Date     ANESTHESIA CARDIOVERSION N/A 5/17/2017    Procedure: ANESTHESIA CARDIOVERSION;  ANESTHESIA CARDIOVERSION;  Surgeon: GENERIC ANESTHESIA PROVIDER;  Location: [facility] OR     ARTHRODESIS WRIST  2000    Right wrist     FOOT SURGERY      4 left and 2 right     RELEASE CARPAL TUNNEL BILATERAL       REPAIR VENTRICULAR SEPTAL DEFECT  1969              Social History:     Social History     Social History     Marital status:      Spouse name: Romeo Michel     Number of children: 0     Years of education: N/A     Occupational History      [employer]     Social History Main Topics     Smoking status: Never Smoker     Smokeless tobacco: Never Used     Alcohol use Yes      Comment: Glass of wine two evenings a night     Drug use: No     Sexual activity: Not on file     Other Topics Concern     Parent/Sibling W/ Cabg, Mi Or Angioplasty Before 65f 55m? No     Social History Narrative            Family History:     Family History   Problem Relation Age of Onset     Breast Cancer  Mother      Hypertension Mother      Alzheimer Disease Mother      Hypertension Father      Blood Disease Father      Lymphoa     Circulatory Father      A Fib     DIABETES Paternal Grandmother                 Allergies:      Allergies   Allergen Reactions     Penicillins      Tape [Adhesive Tape] Rash            Medications:     Prescriptions Prior to Admission   Medication Sig Dispense Refill Last Dose     ertapenem (INVANZ) 1 GM vial Inject 1 g into the vein every 24 hours 10 each  Unknown at Unknown time     insulin aspart (NOVOLOG PEN) 100 UNIT/ML injection Inject 1-7 Units Subcutaneous 3 times daily (before meals)   Unknown at Unknown time     insulin aspart (NOVOLOG PEN) 100 UNIT/ML injection Inject 1-5 Units Subcutaneous At Bedtime   Unknown at Unknown time     melatonin 3 MG tablet Take 1 tablet (3 mg) by mouth nightly as needed for sleep   Unknown at Unknown time     melatonin 1 MG TABS tablet Take 1 tablet (1 mg) by mouth At Bedtime   Unknown at Unknown time     morphine 0.1% in solosite topical gel Place 2 g onto the skin 3 times daily  0 Unknown at Unknown time     multivitamin, therapeutic with minerals (THERA-VIT-M) TABS tablet Take 1 tablet by mouth daily 30 each 0 Unknown at Unknown time     [START ON 7/15/2017] predniSONE (DELTASONE) 5 MG tablet Take 1 tablet (5 mg) by mouth daily   Unknown at Unknown time     predniSONE (DELTASONE) 10 MG tablet Take 1 tablet (10 mg) by mouth daily (Patient taking differently: Take 10 mg by mouth daily Take 10mg daily, last dose 6/28)   Unknown at Unknown time     [START ON 7/1/2017] predniSONE (DELTASONE) 2.5 MG tablet Take 3 tablets (7.5 mg) by mouth daily (Patient taking differently: Take 7.5 mg by mouth daily Start 6/29 for 2 weeks)   Unknown at Unknown time     vancomycin 1,500 mg Inject 1,500 mg into the vein every 12 hours   Unknown at Unknown time     zinc sulfate (ZINCATE) 220 (50 ZN) MG capsule Take 1 capsule (220 mg) by mouth daily   Unknown at Unknown  "time     ascorbic acid 500 MG TABS Take 1 tablet (500 mg) by mouth daily 30 tablet  Unknown at Unknown time     beta carotene 22857 UNIT capsule Take 1 capsule (25,000 Units) by mouth daily   Unknown at Unknown time     acetaminophen (TYLENOL) 325 MG tablet Take 2 tablets (650 mg) by mouth every 4 hours as needed for mild pain 100 tablet  Unknown at Unknown time     apixaban ANTICOAGULANT (ELIQUIS) 2.5 MG tablet Take 1 tablet (2.5 mg) by mouth 2 times daily   Unknown at Unknown time     cetirizine (ZYRTEC) 10 MG tablet Take 1 tablet (10 mg) by mouth daily 30 tablet  Unknown at Unknown time     atenolol (TENORMIN) 50 MG tablet Take 1 tablet (50 mg) by mouth daily 30 tablet  Unknown at Unknown time     digoxin (LANOXIN) 250 MCG tablet Take 1 tablet (250 mcg) by mouth daily 30 tablet  Unknown at Unknown time     triamcinolone (KENALOG) 0.1 % ointment Apply topically 2 times daily   Unknown at Unknown time     senna-docusate (SENOKOT-S;PERICOLACE) 8.6-50 MG per tablet Take 1-2 tablets by mouth 2 times daily 100 tablet  Unknown at Unknown time     Calcium Carb-Cholecalciferol 600-800 MG-UNIT TABS Take 2 tablets by mouth every morning   Unknown at Unknown time     Coenzyme Q10 (COQ-10 PO) Take 1 tablet by mouth daily In the morning   Unknown at Unknown time     gabapentin (NEURONTIN) 100 MG capsule Take 2 capsules (200 mg) by mouth 3 times daily (Patient taking differently: Take 200 mg by mouth Take 2 capsules (200 mg) by mouth every 6 hours (4 am, 10am, 4pm, 10pm)) 180 capsule 3 Unknown at Unknown time     folic acid (FOLVITE) 1 MG tablet Take 1 mg by mouth daily.   Unknown at Unknown time             Review of Systems:   A 14 point ROS was completed and is negative other than what is stated in the HPI.          Physical Exam:   Blood pressure 90/74, pulse 120, temperature 97.7  F (36.5  C), temperature source Oral, resp. rate 28, height 1.48 m (4' 10.25\"), weight 86.2 kg (190 lb), SpO2 98 %, not currently " breastfeeding.  Gen: in no acute distress   Head: atraumatic   Eyes: EOM intact   Mouth: no pharyngeal exudate, NG tube  Lymph node: not enlarged on head or neck   CV: tachy, S1 & S2, no murmurs/gallops, 5mm pitting edema from feet to thighs, 3mm pitting edema in hands  Lungs: decreased breath sounds right base. Clear otherwise  Abd: soft, nontender, BS present   Skin: no rash      Tubes/Lines/Devices:   PICC Triple Lumen 06/26/17 Right Basilic ok to use. (Active)   Site Assessment WDL 6/26/2017  1:29 PM   External Cath Length (cm) 3 cm 6/26/2017  1:29 PM   Extremity Circumference (cm) 35 cm 6/26/2017  1:29 PM   Dressing Intervention Chlorhexidine patch;Transparent;Securing device;New dressing 6/26/2017  1:29 PM   Dressing Change Due 07/03/17 6/26/2017  1:29 PM   PICC Lumen Assessment Trigger Red;White;Gray 6/26/2017  1:29 PM   Number of days:0            Data:   Laboratory data reviewed.     Cultures:   Invalid input(s): BC       Recent Labs  Lab 06/26/17  1115 06/26/17  1016 06/26/17  1013   CULT Pending No growth after 2 hours No growth after 2 hours       No results for input(s): [facility] in the last 168 hours.    Unresulted Labs Ordered in the Past 30 Days of this Admission     Date and Time Order Name Status Description    6/26/2017 0935 Urine Culture Aerobic Bacterial Preliminary     6/26/2017 0935 Blood culture Preliminary     6/26/2017 0935 Blood culture Preliminary     5/29/2017 1046 Adalimumab Activity and Neutralizing Antibodies: Laboratory Miscellaneous Order In process           Imaging/Studies reviewed.

## 2017-06-26 NOTE — PROGRESS NOTES
CARDIOLOGY PROGRESS NOTE    SUBJECTIVE:  Mrs. Michel noted feeling fatigued. Occasional palpitations. No dizziness, lightheadedness, or chest pain.     Overnight, had RVR with hypotension after furosemide given to address volume overload.     ROS otherwise negative.    OBJECTIVE:  Vital signs:  87/66 (Range 64-99/45-67)  123 (45-67)  20 (18-20)  97.8 (Tm 97.8)  188 lbs 0 oz (weight 06/22 179)    I/O: 1570 on 06/24, 1055 thus far 06/25 (output not charted, presumably due to incontinence)    PHYSICAL EXAM:  Gen: Looks well  HEENT: MMM  Resp: Tachypnea when moving, chest ausc with coarse crackles bilaterally  CVS: JVP to the angle of the mandible, pulse irreg irreg, S1+S2 without added sounds or murmurs  Abdo: S, NT, +BS  Extremities: Warm, well-perfused, legs wrapped  Neuro: GCS 15/15, AAOx3     Lab Test  06/25/17   0710   HGB  8.3*   HCT  23.9*   WBC  3.3*   MCV  93   MCH  32.4   MCHC  34.7   RDW  20.7*   PLT  30*   NA  133   POTASSIUM  4.6   CHLORIDE  106   CO2  18*   BUN  47*   CR  0.85   GLC  143*   VIKTORIYA  7.3*   ALBUMIN   --    BILITOTAL   --    ALKPHOS   --    AST   --    ALT   --          Cardiac meds: Atenolol 50 mg q24h, apixaban 2.5 mg q24h, digoxin 62.5 micograms q24h  Non-cardiac meds:  Beta carotene, calcium-Vit D, cetirizine, ertapenem, folic acid, gabapentin, insulin, melatonin, morphine, pantoprazole, prednisone, zinc     ASSESSMENT/PLAN:  Mrs. Amelia Michel is a 56-year old lady with a PMHx of RA and a recent diagnosis of AFib that was complicated by OOH cardiac arrest (thought to be caused by a lengthy pause with degeneration into VFib when converting from AFib to sinus rhythm int he setting of multiple prieto agents) who presented with fever. Cardiology was consulted to evaluate AFib.    Recurrent episodes of RVR in the setting. Volume overload may also be driving AFib.     - AFib   - Recommend administration of digoxin 125 micrograms tonight   - Recommend checking true trough level right before AM  digoxin dosage   - Continue atenolol 50 mg q24h   - Recommend diuresis as able with hypotension; would preferentially seek diuresis rather than raising atenolol to ameliorate AFib with RVR     Seen and staffed with Dr. Gandara.    Gilbert Og  Cardiology Fellow  Pager: [phone]            I interviewed and examined the patient with the CV fellow.  I agree with the assessment and plan as documented.      Selvin Gandara MD, PhD  Professor of Medicine  Division of Cardiology

## 2017-06-26 NOTE — PLAN OF CARE
Problem: Goal Outcome Summary  Goal: Goal Outcome Summary  PT 6C:  Cancel.  Reports nausea and is not feeling well this AM.  Possible transfer to ICU per RN.  Will reschedule tomorrow.

## 2017-06-27 ENCOUNTER — APPOINTMENT (OUTPATIENT)
Dept: PHYSICAL THERAPY | Facility: CLINIC | Age: 57
DRG: 871 | End: 2017-06-27
Attending: INTERNAL MEDICINE
Payer: COMMERCIAL

## 2017-06-27 ENCOUNTER — APPOINTMENT (OUTPATIENT)
Dept: OCCUPATIONAL THERAPY | Facility: CLINIC | Age: 57
DRG: 871 | End: 2017-06-27
Attending: INTERNAL MEDICINE
Payer: COMMERCIAL

## 2017-06-27 LAB
ALBUMIN SERPL-MCNC: 1.4 G/DL (ref 3.4–5)
ALP SERPL-CCNC: 129 U/L (ref 40–150)
ALT SERPL W P-5'-P-CCNC: 60 U/L (ref 0–50)
ANION GAP SERPL CALCULATED.3IONS-SCNC: 7 MMOL/L (ref 3–14)
AST SERPL W P-5'-P-CCNC: 54 U/L (ref 0–45)
BACTERIA SPEC CULT: NO GROWTH
BILIRUB SERPL-MCNC: 0.6 MG/DL (ref 0.2–1.3)
BUN SERPL-MCNC: 39 MG/DL (ref 7–30)
CALCIUM SERPL-MCNC: 7.3 MG/DL (ref 8.5–10.1)
CHLORIDE SERPL-SCNC: 110 MMOL/L (ref 94–109)
CO2 SERPL-SCNC: 21 MMOL/L (ref 20–32)
CREAT SERPL-MCNC: 0.65 MG/DL (ref 0.52–1.04)
ERYTHROCYTE [DISTWIDTH] IN BLOOD BY AUTOMATED COUNT: 21.6 % (ref 10–15)
GFR SERPL CREATININE-BSD FRML MDRD: ABNORMAL ML/MIN/1.7M2
GLUCOSE BLDC GLUCOMTR-MCNC: 142 MG/DL (ref 70–99)
GLUCOSE BLDC GLUCOMTR-MCNC: 152 MG/DL (ref 70–99)
GLUCOSE BLDC GLUCOMTR-MCNC: 177 MG/DL (ref 70–99)
GLUCOSE BLDC GLUCOMTR-MCNC: 186 MG/DL (ref 70–99)
GLUCOSE BLDC GLUCOMTR-MCNC: 193 MG/DL (ref 70–99)
GLUCOSE SERPL-MCNC: 184 MG/DL (ref 70–99)
HCT VFR BLD AUTO: 22.3 % (ref 35–47)
HGB BLD-MCNC: 7.7 G/DL (ref 11.7–15.7)
LACTATE BLD-SCNC: 2.2 MMOL/L (ref 0.7–2.1)
Lab: NORMAL
MCH RBC QN AUTO: 32.4 PG (ref 26.5–33)
MCHC RBC AUTO-ENTMCNC: 34.5 G/DL (ref 31.5–36.5)
MCV RBC AUTO: 94 FL (ref 78–100)
MICRO REPORT STATUS: NORMAL
PLATELET # BLD AUTO: 38 10E9/L (ref 150–450)
POTASSIUM SERPL-SCNC: 4.7 MMOL/L (ref 3.4–5.3)
PROT SERPL-MCNC: 4.2 G/DL (ref 6.8–8.8)
RBC # BLD AUTO: 2.38 10E12/L (ref 3.8–5.2)
SODIUM SERPL-SCNC: 138 MMOL/L (ref 133–144)
SPECIMEN SOURCE: NORMAL
WBC # BLD AUTO: 2.8 10E9/L (ref 4–11)

## 2017-06-27 PROCEDURE — 27210436 ZZH NUTRITION PRODUCT SEMIELEM INTERMED CAN

## 2017-06-27 PROCEDURE — 25000132 ZZH RX MED GY IP 250 OP 250 PS 637: Performed by: STUDENT IN AN ORGANIZED HEALTH CARE EDUCATION/TRAINING PROGRAM

## 2017-06-27 PROCEDURE — P9041 ALBUMIN (HUMAN),5%, 50ML: HCPCS | Performed by: STUDENT IN AN ORGANIZED HEALTH CARE EDUCATION/TRAINING PROGRAM

## 2017-06-27 PROCEDURE — 40000802 ZZH SITE CHECK

## 2017-06-27 PROCEDURE — 25000132 ZZH RX MED GY IP 250 OP 250 PS 637

## 2017-06-27 PROCEDURE — 97140 MANUAL THERAPY 1/> REGIONS: CPT | Mod: GO

## 2017-06-27 PROCEDURE — 40000193 ZZH STATISTIC PT WARD VISIT: Performed by: PHYSICAL THERAPIST

## 2017-06-27 PROCEDURE — 25000125 ZZHC RX 250: Performed by: STUDENT IN AN ORGANIZED HEALTH CARE EDUCATION/TRAINING PROGRAM

## 2017-06-27 PROCEDURE — 00000146 ZZHCL STATISTIC GLUCOSE BY METER IP

## 2017-06-27 PROCEDURE — 27210437 ZZH NUTRITION PRODUCT SEMIELEM INTERMED LITER

## 2017-06-27 PROCEDURE — 25000132 ZZH RX MED GY IP 250 OP 250 PS 637: Performed by: INTERNAL MEDICINE

## 2017-06-27 PROCEDURE — 97110 THERAPEUTIC EXERCISES: CPT | Mod: GO

## 2017-06-27 PROCEDURE — 25000128 H RX IP 250 OP 636: Performed by: STUDENT IN AN ORGANIZED HEALTH CARE EDUCATION/TRAINING PROGRAM

## 2017-06-27 PROCEDURE — 97530 THERAPEUTIC ACTIVITIES: CPT | Mod: GP | Performed by: PHYSICAL THERAPIST

## 2017-06-27 PROCEDURE — 40000133 ZZH STATISTIC OT WARD VISIT

## 2017-06-27 PROCEDURE — 85027 COMPLETE CBC AUTOMATED: CPT | Performed by: STUDENT IN AN ORGANIZED HEALTH CARE EDUCATION/TRAINING PROGRAM

## 2017-06-27 PROCEDURE — 25000132 ZZH RX MED GY IP 250 OP 250 PS 637: Performed by: NURSE PRACTITIONER

## 2017-06-27 PROCEDURE — 97110 THERAPEUTIC EXERCISES: CPT | Mod: GP | Performed by: PHYSICAL THERAPIST

## 2017-06-27 PROCEDURE — 21400006 ZZH R&B CCU INTERMEDIATE UMMC

## 2017-06-27 PROCEDURE — 83605 ASSAY OF LACTIC ACID: CPT | Performed by: PHYSICAL MEDICINE & REHABILITATION

## 2017-06-27 PROCEDURE — 36592 COLLECT BLOOD FROM PICC: CPT | Performed by: STUDENT IN AN ORGANIZED HEALTH CARE EDUCATION/TRAINING PROGRAM

## 2017-06-27 PROCEDURE — 99233 SBSQ HOSP IP/OBS HIGH 50: CPT | Mod: GC | Performed by: INTERNAL MEDICINE

## 2017-06-27 PROCEDURE — 36592 COLLECT BLOOD FROM PICC: CPT | Performed by: PHYSICAL MEDICINE & REHABILITATION

## 2017-06-27 PROCEDURE — 25000125 ZZHC RX 250: Performed by: INTERNAL MEDICINE

## 2017-06-27 PROCEDURE — 40000558 ZZH STATISTIC CVC DRESSING CHANGE

## 2017-06-27 PROCEDURE — 80053 COMPREHEN METABOLIC PANEL: CPT | Performed by: STUDENT IN AN ORGANIZED HEALTH CARE EDUCATION/TRAINING PROGRAM

## 2017-06-27 RX ORDER — MIDODRINE HYDROCHLORIDE 2.5 MG/1
2.5 TABLET ORAL
Status: DISCONTINUED | OUTPATIENT
Start: 2017-06-27 | End: 2017-06-27

## 2017-06-27 RX ORDER — MIDODRINE HYDROCHLORIDE 5 MG/1
5 TABLET ORAL
Status: DISCONTINUED | OUTPATIENT
Start: 2017-06-28 | End: 2017-06-28

## 2017-06-27 RX ORDER — SULFAMETHOXAZOLE/TRIMETHOPRIM 800-160 MG
1 TABLET ORAL 2 TIMES DAILY
Status: DISCONTINUED | OUTPATIENT
Start: 2017-06-27 | End: 2017-07-01

## 2017-06-27 RX ORDER — ALBUMIN, HUMAN INJ 5% 5 %
12.5 SOLUTION INTRAVENOUS ONCE
Status: COMPLETED | OUTPATIENT
Start: 2017-06-27 | End: 2017-06-27

## 2017-06-27 RX ORDER — FUROSEMIDE 10 MG/ML
20 INJECTION INTRAMUSCULAR; INTRAVENOUS ONCE
Status: COMPLETED | OUTPATIENT
Start: 2017-06-27 | End: 2017-06-27

## 2017-06-27 RX ADMIN — Medication 1 MG: at 22:00

## 2017-06-27 RX ADMIN — PANTOPRAZOLE SODIUM 40 MG: 40 TABLET, DELAYED RELEASE ORAL at 05:43

## 2017-06-27 RX ADMIN — OXYCODONE HYDROCHLORIDE AND ACETAMINOPHEN 500 MG: 500 TABLET ORAL at 08:42

## 2017-06-27 RX ADMIN — ATENOLOL 50 MG: 50 TABLET ORAL at 08:42

## 2017-06-27 RX ADMIN — Medication 30 MG: at 08:42

## 2017-06-27 RX ADMIN — CALCIUM CARBONATE 600 MG (1,500 MG)-VITAMIN D3 400 UNIT TABLET 2 TABLET: at 08:42

## 2017-06-27 RX ADMIN — INSULIN ASPART 1 UNITS: 100 INJECTION, SOLUTION INTRAVENOUS; SUBCUTANEOUS at 16:31

## 2017-06-27 RX ADMIN — DIGOXIN 125 MCG: 125 TABLET ORAL at 08:42

## 2017-06-27 RX ADMIN — INSULIN ASPART 2 UNITS: 100 INJECTION, SOLUTION INTRAVENOUS; SUBCUTANEOUS at 08:41

## 2017-06-27 RX ADMIN — SULFAMETHOXAZOLE AND TRIMETHOPRIM 1 TABLET: 800; 160 TABLET ORAL at 23:19

## 2017-06-27 RX ADMIN — FUROSEMIDE 20 MG: 10 INJECTION, SOLUTION INTRAVENOUS at 09:30

## 2017-06-27 RX ADMIN — CALCIUM CARBONATE (ANTACID) CHEW TAB 500 MG 500 MG: 500 CHEW TAB at 05:43

## 2017-06-27 RX ADMIN — SODIUM CHLORIDE, PRESERVATIVE FREE 5 ML: 5 INJECTION INTRAVENOUS at 12:25

## 2017-06-27 RX ADMIN — FOLIC ACID 1 MG: 1 TABLET ORAL at 08:43

## 2017-06-27 RX ADMIN — ERTAPENEM SODIUM 1 G: 1 INJECTION, POWDER, LYOPHILIZED, FOR SOLUTION INTRAMUSCULAR; INTRAVENOUS at 11:11

## 2017-06-27 RX ADMIN — SODIUM CHLORIDE, PRESERVATIVE FREE 5 ML: 5 INJECTION INTRAVENOUS at 08:23

## 2017-06-27 RX ADMIN — Medication 25000 UNITS: at 08:42

## 2017-06-27 RX ADMIN — CARBIDOPA AND LEVODOPA 2.5 MG: 50; 200 TABLET, EXTENDED RELEASE ORAL at 16:31

## 2017-06-27 RX ADMIN — Medication 2 G: at 20:10

## 2017-06-27 RX ADMIN — GABAPENTIN 200 MG: 100 CAPSULE ORAL at 14:22

## 2017-06-27 RX ADMIN — MULTIPLE VITAMINS W/ MINERALS TAB 1 TABLET: TAB at 08:43

## 2017-06-27 RX ADMIN — TRIAMCINOLONE ACETONIDE: 1 OINTMENT TOPICAL at 09:54

## 2017-06-27 RX ADMIN — PREDNISONE 10 MG: 10 TABLET ORAL at 08:43

## 2017-06-27 RX ADMIN — ALBUMIN (HUMAN) 12.5 G: 12.5 SOLUTION INTRAVENOUS at 09:29

## 2017-06-27 RX ADMIN — Medication 2 G: at 14:23

## 2017-06-27 RX ADMIN — SODIUM CHLORIDE, PRESERVATIVE FREE 5 ML: 5 INJECTION INTRAVENOUS at 14:23

## 2017-06-27 RX ADMIN — GABAPENTIN 200 MG: 100 CAPSULE ORAL at 08:43

## 2017-06-27 RX ADMIN — GABAPENTIN 200 MG: 100 CAPSULE ORAL at 20:10

## 2017-06-27 RX ADMIN — Medication 220 MG: at 08:42

## 2017-06-27 NOTE — PROGRESS NOTES
CLINICAL NUTRITION SERVICES - REASSESSMENT NOTE     Nutrition Prescription    RECOMMENDATIONS FOR MDs/PROVIDERS TO ORDER:  Adjust free water flushes via feeding tube as needed, pending fluid status and oral intake. Currently, free water flushes via feeding tube are for patency only.    Recommendations already ordered by Registered Dietitian (RD):  1. Increased TFs.  2. Modified free water flushes to patency.  3. Placed stop date for beta carotene, vitamin C, and zinc as ten-day duration will be reached 6/28.     Future/Additional Recommendations:  1. Monitor kcal counts and possible ability to adjust TFs if needed, especially once oral intake improves. Goal for TFs + oral intake to meet 100% of nutrition needs. Once consuming at least 935 kcals and 46 g protein daily, then rec discontinue TFs.  2. Vitamin C, zinc, and vitamin A supplementation may be resumed for an additional ten-day course if wound is not healing.   3. Continue current diet order as per SLP. Encourage high protein and high kcal options, including oral supplements, as tolerated.   4. Consider scheduling alternate antiemetics/antinauseants (other than zofran and compazine as per pt preference) ~20 minutes prior to meals to help optimize nausea control.     5. Continue thera-vit-M as ordered to ensure micronutrient needs are met. Continue calcium/vitamin D as ordered due to prednisone.  6. Consider checking folic acid lab to assess need to continue current supplementation.   7. Assess ability to discontinue coenzyme Q10, if able.  8. Continue to minimize suspension medications. If no improvements in stooling, consider ordering Nutrisource Fiber, 1 pkt TID via feeding tube to see if this helps stools. This may be increased by 2 pkts per day until 8 packets per day is reached. Could consider probiotics if/when appropriate.   9. With duration of enteral nutrition, monitor possible need for longer-term feeding tube option if pt medically declines or if  oral intake remains inadequate.     EVALUATION OF THE PROGRESS TOWARD GOALS   Diet: Regular + thin liquids since 6/22 (as rec by SLP). Was on a DD III + thin liquid diet 6/19-6/22. Has orders for Ensure Clear (berry at 14:00) and 1% milk with one packet of Beneprotein at 10:00    Intake: Per nursing flowsheets, pt consuming 50-75% of meals with a fair appetite 6/20, 50-75% of meals with a fair to good appetite 6/22, 100% of meals with a good appetite 6/23, 100% of meals with a good appetite 6/23, 50-75% of meals with a fair to good appetite 6/25, and 100% of meals with a good appetite 6/26. Pt was sleepy during nutrition visit. Per pt's , Wolf, factors that have affected oral intake include primary nausea and also medications she is taking. Pt consuming 5-10% of items at breakfast today. Per pt, her appetite and oral intake has decreased the past two days.       Kcal counts:   6/20   765 kcals and 56 g protein (meal/s and 100% one packet Beneprotein supplement/s recorded)   6/21   628 kcals and 44 g protein (meal/s and 100% Ensure Clear and one packet Beneprotein supplement/s recorded)   6/22   421 kcals and 29 g protein (one meal/s and no supplement/s recorded)   6/26   656 kcals and 32 g protein (meal/s and 100% Ensure Clear supplement/s recorded)   6/27-6/28 Pending   *  Pt consumed a four-day average of 617 kcals and 40 g protein daily.This does not meet estimated needs of 2386-0381 kcals/day (25 - 30 kcals/kg) and 61-77 grams protein/day (1.2 - 1.5 grams of pro/kg).        Per Wolf, pt consumed 100% of an Ensure Clear, and two-thirds of a non-fat Greek yogurt on 6/24. On 6/25, pt consumed a non-fat Greek Yogurt ([employer]). Pt ordered two meals on 6/24 via room service and three meals on 6/25 via room service.     Nutrition Support:    - Feeding Tube (FT) access:  NDT + bridle placed via Cortrak 5/31  - TF regimen: Peptamen 1.5 at 40 mL/hr x 12 hrs (18:00-6:00 per pt preference) provides 480 mL TF,  720 kcals (14 kcals/kg), 33 g protein (0.6 g protein/kg), 370 mL H2O, 90 g CHO, and no fiber daily.  - Feeding Tube Flushes: 75 mL before and after cycled TFs  - Intake:  Pt received a five-day TF average of 588 mL/day.  This provided 882 kcals and 40 g protein and does not meet pt's estimated needs (see above).       NEW FINDINGS   WO nurse 6/20: Large area of epidermal loss with potential full thickness skin loss at area of necrosis 2/2 extravastion   Stools: Per chart, having 0-5 stools per day. Stools are brown, loose, and watery. C diff negative on 6/24. Rectal tube to be placed 6/27.     MALNUTRITION  % Intake: Not meeting this criteria.     % Weight Loss: Not meeting this criteria. Difficult to assess with fluid status.  Subcutaneous Fat Loss: None observed, but difficult to assess with fluid status.    Muscle Loss: None observed, but difficult to assess with fluid status. Per pt, possible losses.   Fluid Accumulation/Edema: Mild minimally. Anasarca per chart.  Malnutrition Diagnosis: Patient does not meet two of the above criteria necessary for diagnosing malnutrition but is at risk for malnutrition    Previous Goals   Total average nutrition intake to meet 2634-6760 kcals/day (25 - 30 kcals/kg) and 61-77 grams protein/day (1.2 - 1.5 grams of pro/kg).  Evaluation: Met with oral intake + TFs.     Previous Nutrition Diagnosis  Inadequate oral intake related to restrictive diet order, medications, and intermittent nausea as evidenced by kcal count average of 348 kcals and 18 g protein daily from 6/8-6/15 and pt report of no improvement in appetite at rehab per pt.     Evaluation: Unresolved. Updated below.    CURRENT NUTRITION DIAGNOSIS  Inadequate oral intake related to nausea and medications as evidenced by pt consumed a four-day kcal count average of 617 kcals and 40 g protein daily which does not meet estimated needs of 6810-4740 kcals/day (25 - 30 kcals/kg) and 61-77 grams protein/day (1.2 - 1.5 grams of  pro/kg).       INTERVENTIONS  Implementation  1. Collaboration with other providers: Discussed nutrition support regimen with team. Team in favor of increasing TF provisions to provide adequate nutrition, RN made aware. Discussed current TF formula with team. Team ok with stop date of 6/28 pm for micronutrient regimen as ten-day duration will be reached.   2. Enteral Nutrition: Changed TFs to meet 100% of nutrition needs due to decreasing oral intake. Peptamen 1.5 at goal 40 ml/hr (960 ml/day) to provide 1440 kcals (28 kcal/kg/day), 65 g PRO (1.3 g/kg/day), 739 ml free H2O, 54 g Fat (70% from MCTs), 180 g CHO and no Fiber daily.   3. Feeding tube flush: Modified free water to 30 mL Q 4 hrs, patency.  4. Medical food supplement therapy: Discontinued Ensure Clear as per pt preference. Pt declined increase in Beneprotein supplementation. Placed prn supplement order. She may request additional or alternate oral supplements prn.   5. Multivitamin/mineral supplement therapy: Placed stop date for tomorrow pm (6/28 pm) on beta carotene, vitamin C, and zinc as ten-day duration will be reached.     Goals  Total average nutrition intake to meet 2409-5282 kcals/day (25 - 30 kcals/kg) and 61-77 grams protein/day (1.2 - 1.5 grams of pro/kg).    Monitoring/Evaluation  Progress toward goals will be monitored and evaluated per protocol.     Nutrition will continue to follow.    Inés Brown, MS, RD, LD, [facility]   6C Pgr:  [phone]

## 2017-06-27 NOTE — PLAN OF CARE
"Problem: Goal Outcome Summary  Goal: Goal Outcome Summary  Outcome: No Change  D: Patient admit 6/19 with LUE wound pain and fever.   I/A: Alert, oriented. Denies pain; but patient reports discomfort from increased breathing efforts. At 0700, Med [facility] 2 team contacted due to patient discomfort and overall \"not feeling well\". Provider at bedside and consulted cardiology team. CARDS 1 team at bedside about 0940 for further plan of care. New orders for: bladder scan and straight cath for UA/UC; Platelets ordered (clarified with Evie ELI on CARDS 1 only giving 1 unit of platelets; started at 1200); 2 view xray, and PICC line placement (midline removed for PICC access). Further plan for RHC today. Patient blood pressures low at 80s/70s this am; following platelet administration, blood pressures improved to 90s/50s-60s for rest of shift. BS: 160s. L arm dressing changed. Patient continued to report dyspnea with small movements (repositioning in bed, etc), but stats remained upper 90s% and patient reported \"feeling better\" later on in day. Edema +2-+4 (skin weaping fluid in arms/legs). Incontinent of urine/stool; patient able to report when needing to be changed; skin barrier applied to bottom; blancheable redness. NPO for RHC today; patient taken to RHC at 1500; returned from cath lab at 1830; R IJ C/D/I. Rhythm: A Flutter. NG tube running cycled TF from 6p-6a; TF started late, will run for 12 hours overnight. Lactic acid this am trending down (2.3). Lymph wraps on. PT/OT refused today due to patient condition. New orders for brand placement for strict I/O monitoring. VBG, lactate ordered for when patient returned from cath lab this evening. Float/Float called to observe patient approximately 1900.   P: Update CARDS 1 if questions/concerns.   Juanita Trivedi RN          "

## 2017-06-27 NOTE — PLAN OF CARE
Problem: Goal Outcome Summary  Goal: Goal Outcome Summary  SLP: Cx session. Patient declined language therapy, would like to try again tomorrow. SLP to f/u per POC

## 2017-06-27 NOTE — PLAN OF CARE
Problem: Goal Outcome Summary  Goal: Goal Outcome Summary  PT/6c: Pt w/ limited activity tolerance d/t shortness of breath, dizziness, fatigue, total body edema, and significant weakness. Pt requiring min A for rolling; dangled EOB w/ max fatigue <3 minutes w/ mod A; Ax2 for sit > supine.    Recommend pt disch to rehab facility to improve her strength, activity tolerance, and mobility.

## 2017-06-27 NOTE — PLAN OF CARE
Problem: Goal Outcome Summary  Goal: Goal Outcome Summary  Outcome: No Change  D: Pt admitted 6/19 with sepsis 2/2 L arm cellulitis (from extravasation). Recent hospitalization after OOH cardiac arrest.  I/A: Pt continues with significant generalized edema- lymph wraps on BLE, all extremities elevated. Continues with fecal incontinence, stools liquid/loose this shift (changed from soft/paste)- incontinence cares done, barrier cream applied to reddened buttocks. Turned/repositioned pt as tolerated. Huynh catheter placed for I&O monitoring. Sats stable on 2-3L. Pt continues with dyspnea with minimal exertion (talking, turning). TF infusing at 40ml/hr (6p-6a). Calorie counts. Lifevest on, spare batteries charged. BG monitored. Pt resting between cares overnight.   P: Continue to monitor and assess pt condition and contact treatment team with questions or concerns.

## 2017-06-27 NOTE — PROGRESS NOTES
Calorie Counts  Intake recorded for: 6/26 Kcals: 656  Protein: 32g  # Meals Recorded: 100% deli sandwich with turkey, fruit cup, ginger ale  # Supplements Recorded: 100% Ensure Clear

## 2017-06-27 NOTE — PLAN OF CARE
"Problem: Goal Outcome Summary  Goal: Goal Outcome Summary  OT/EDEMA 6C: Patient tolerating wear of gradient compression bandaging well according to set 48 hour schedule. Fluid rapidly returns to lower extremities when compression bandaging removed, even for short periods of time, and patient with shiny 2-3+ pitting edema still present from MTP to groin. To aid in fluid movement, skin integrity and comfort, thigh high wraps donned to (R) lower extremity and knee high wraps donned to (L) lower extremity in \"Quick Wrap\" fashion. Patient may wear x24-48 hours, however, please remove if increased pain, numbness/tingling or soiling occurs.      "

## 2017-06-27 NOTE — PLAN OF CARE
Problem: Goal Outcome Summary  Goal: Goal Outcome Summary  Outcome: Therapy, progress toward functional goals is gradual  OT: Pt completed 9x 10-12 reps UE ROM exercises supine inclined in bed within pain free ROM and tolerance. Pt. experienced some SOB requiring breaks between each exercise. VSS.   REC: Discharge to TCU to increase functional mobility and activity tolerance.

## 2017-06-28 ENCOUNTER — APPOINTMENT (OUTPATIENT)
Dept: OCCUPATIONAL THERAPY | Facility: CLINIC | Age: 57
DRG: 871 | End: 2017-06-28
Attending: INTERNAL MEDICINE
Payer: COMMERCIAL

## 2017-06-28 ENCOUNTER — APPOINTMENT (OUTPATIENT)
Dept: SPEECH THERAPY | Facility: CLINIC | Age: 57
DRG: 871 | End: 2017-06-28
Attending: INTERNAL MEDICINE
Payer: COMMERCIAL

## 2017-06-28 ENCOUNTER — APPOINTMENT (OUTPATIENT)
Dept: PHYSICAL THERAPY | Facility: CLINIC | Age: 57
DRG: 871 | End: 2017-06-28
Attending: INTERNAL MEDICINE
Payer: COMMERCIAL

## 2017-06-28 LAB
ALBUMIN SERPL-MCNC: 1.5 G/DL (ref 3.4–5)
ALP SERPL-CCNC: 126 U/L (ref 40–150)
ALT SERPL W P-5'-P-CCNC: 50 U/L (ref 0–50)
ANION GAP SERPL CALCULATED.3IONS-SCNC: 6 MMOL/L (ref 3–14)
ANION GAP SERPL CALCULATED.3IONS-SCNC: 6 MMOL/L (ref 3–14)
APTT PPP: 32 SEC (ref 22–37)
AST SERPL W P-5'-P-CCNC: 41 U/L (ref 0–45)
BASOPHILS # BLD AUTO: 0 10E9/L (ref 0–0.2)
BASOPHILS NFR BLD AUTO: 0.3 %
BILIRUB SERPL-MCNC: 0.8 MG/DL (ref 0.2–1.3)
BLD PROD TYP BPU: NORMAL
BLD UNIT ID BPU: 0
BLOOD PRODUCT CODE: NORMAL
BPU ID: NORMAL
BUN SERPL-MCNC: 38 MG/DL (ref 7–30)
BUN SERPL-MCNC: 43 MG/DL (ref 7–30)
CALCIUM SERPL-MCNC: 7.2 MG/DL (ref 8.5–10.1)
CALCIUM SERPL-MCNC: 7.4 MG/DL (ref 8.5–10.1)
CHLORIDE SERPL-SCNC: 108 MMOL/L (ref 94–109)
CHLORIDE SERPL-SCNC: 109 MMOL/L (ref 94–109)
CO2 SERPL-SCNC: 20 MMOL/L (ref 20–32)
CO2 SERPL-SCNC: 23 MMOL/L (ref 20–32)
CREAT SERPL-MCNC: 0.65 MG/DL (ref 0.52–1.04)
CREAT SERPL-MCNC: 0.68 MG/DL (ref 0.52–1.04)
DIFFERENTIAL METHOD BLD: ABNORMAL
EOSINOPHIL # BLD AUTO: 0 10E9/L (ref 0–0.7)
EOSINOPHIL NFR BLD AUTO: 0 %
ERYTHROCYTE [DISTWIDTH] IN BLOOD BY AUTOMATED COUNT: 20.8 % (ref 10–15)
ERYTHROCYTE [DISTWIDTH] IN BLOOD BY AUTOMATED COUNT: 21.9 % (ref 10–15)
GFR SERPL CREATININE-BSD FRML MDRD: 90 ML/MIN/1.7M2
GFR SERPL CREATININE-BSD FRML MDRD: ABNORMAL ML/MIN/1.7M2
GLUCOSE BLDC GLUCOMTR-MCNC: 152 MG/DL (ref 70–99)
GLUCOSE BLDC GLUCOMTR-MCNC: 169 MG/DL (ref 70–99)
GLUCOSE BLDC GLUCOMTR-MCNC: 182 MG/DL (ref 70–99)
GLUCOSE BLDC GLUCOMTR-MCNC: 187 MG/DL (ref 70–99)
GLUCOSE BLDC GLUCOMTR-MCNC: 207 MG/DL (ref 70–99)
GLUCOSE BLDC GLUCOMTR-MCNC: 264 MG/DL (ref 70–99)
GLUCOSE SERPL-MCNC: 165 MG/DL (ref 70–99)
GLUCOSE SERPL-MCNC: 197 MG/DL (ref 70–99)
HCT VFR BLD AUTO: 20 % (ref 35–47)
HCT VFR BLD AUTO: 26.7 % (ref 35–47)
HGB BLD-MCNC: 6.9 G/DL (ref 11.7–15.7)
HGB BLD-MCNC: 9.2 G/DL (ref 11.7–15.7)
IMM GRANULOCYTES # BLD: 0 10E9/L (ref 0–0.4)
IMM GRANULOCYTES NFR BLD: 0.7 %
INR PPP: 1.31 (ref 0.86–1.14)
LACTATE BLD-SCNC: 1.9 MMOL/L (ref 0.7–2.1)
LDH SERPL L TO P-CCNC: 172 U/L (ref 81–234)
LYMPHOCYTES # BLD AUTO: 0.4 10E9/L (ref 0.8–5.3)
LYMPHOCYTES NFR BLD AUTO: 14.9 %
MCH RBC QN AUTO: 31.8 PG (ref 26.5–33)
MCH RBC QN AUTO: 32.7 PG (ref 26.5–33)
MCHC RBC AUTO-ENTMCNC: 34.5 G/DL (ref 31.5–36.5)
MCHC RBC AUTO-ENTMCNC: 34.5 G/DL (ref 31.5–36.5)
MCV RBC AUTO: 92 FL (ref 78–100)
MCV RBC AUTO: 95 FL (ref 78–100)
MONOCYTES # BLD AUTO: 0.4 10E9/L (ref 0–1.3)
MONOCYTES NFR BLD AUTO: 12.2 %
NEUTROPHILS # BLD AUTO: 2.1 10E9/L (ref 1.6–8.3)
NEUTROPHILS NFR BLD AUTO: 71.9 %
NRBC # BLD AUTO: 0 10*3/UL
NRBC BLD AUTO-RTO: 0 /100
PLATELET # BLD AUTO: 27 10E9/L (ref 150–450)
PLATELET # BLD AUTO: 27 10E9/L (ref 150–450)
POTASSIUM SERPL-SCNC: 4.1 MMOL/L (ref 3.4–5.3)
POTASSIUM SERPL-SCNC: 4.2 MMOL/L (ref 3.4–5.3)
PROT SERPL-MCNC: 3.9 G/DL (ref 6.8–8.8)
RBC # BLD AUTO: 2.11 10E12/L (ref 3.8–5.2)
RBC # BLD AUTO: 2.89 10E12/L (ref 3.8–5.2)
RETICS # AUTO: 51.8 10E9/L (ref 25–95)
RETICS/RBC NFR AUTO: 1.8 % (ref 0.5–2)
SODIUM SERPL-SCNC: 135 MMOL/L (ref 133–144)
SODIUM SERPL-SCNC: 138 MMOL/L (ref 133–144)
TRANSFUSION STATUS PATIENT QL: NORMAL
TRANSFUSION STATUS PATIENT QL: NORMAL
WBC # BLD AUTO: 2.4 10E9/L (ref 4–11)
WBC # BLD AUTO: 3 10E9/L (ref 4–11)

## 2017-06-28 PROCEDURE — 27210437 ZZH NUTRITION PRODUCT SEMIELEM INTERMED LITER

## 2017-06-28 PROCEDURE — 40000802 ZZH SITE CHECK

## 2017-06-28 PROCEDURE — 36592 COLLECT BLOOD FROM PICC: CPT | Performed by: PHYSICAL MEDICINE & REHABILITATION

## 2017-06-28 PROCEDURE — 21400006 ZZH R&B CCU INTERMEDIATE UMMC

## 2017-06-28 PROCEDURE — 86923 COMPATIBILITY TEST ELECTRIC: CPT | Performed by: INTERNAL MEDICINE

## 2017-06-28 PROCEDURE — 40000133 ZZH STATISTIC OT WARD VISIT: Performed by: OCCUPATIONAL THERAPIST

## 2017-06-28 PROCEDURE — 85025 COMPLETE CBC W/AUTO DIFF WBC: CPT | Performed by: STUDENT IN AN ORGANIZED HEALTH CARE EDUCATION/TRAINING PROGRAM

## 2017-06-28 PROCEDURE — 97530 THERAPEUTIC ACTIVITIES: CPT | Mod: GP | Performed by: PHYSICAL THERAPIST

## 2017-06-28 PROCEDURE — 97140 MANUAL THERAPY 1/> REGIONS: CPT | Mod: GO | Performed by: OCCUPATIONAL THERAPIST

## 2017-06-28 PROCEDURE — 80048 BASIC METABOLIC PNL TOTAL CA: CPT | Performed by: STUDENT IN AN ORGANIZED HEALTH CARE EDUCATION/TRAINING PROGRAM

## 2017-06-28 PROCEDURE — 86850 RBC ANTIBODY SCREEN: CPT | Performed by: INTERNAL MEDICINE

## 2017-06-28 PROCEDURE — 25000132 ZZH RX MED GY IP 250 OP 250 PS 637

## 2017-06-28 PROCEDURE — 97110 THERAPEUTIC EXERCISES: CPT | Mod: GP | Performed by: PHYSICAL THERAPIST

## 2017-06-28 PROCEDURE — 40000225 ZZH STATISTIC SLP WARD VISIT: Performed by: SPEECH-LANGUAGE PATHOLOGIST

## 2017-06-28 PROCEDURE — 25000132 ZZH RX MED GY IP 250 OP 250 PS 637: Performed by: NURSE PRACTITIONER

## 2017-06-28 PROCEDURE — 36592 COLLECT BLOOD FROM PICC: CPT | Performed by: STUDENT IN AN ORGANIZED HEALTH CARE EDUCATION/TRAINING PROGRAM

## 2017-06-28 PROCEDURE — 83615 LACTATE (LD) (LDH) ENZYME: CPT | Performed by: STUDENT IN AN ORGANIZED HEALTH CARE EDUCATION/TRAINING PROGRAM

## 2017-06-28 PROCEDURE — 97535 SELF CARE MNGMENT TRAINING: CPT | Mod: GO | Performed by: OCCUPATIONAL THERAPIST

## 2017-06-28 PROCEDURE — 00000146 ZZHCL STATISTIC GLUCOSE BY METER IP

## 2017-06-28 PROCEDURE — 25000125 ZZHC RX 250: Performed by: INTERNAL MEDICINE

## 2017-06-28 PROCEDURE — 99233 SBSQ HOSP IP/OBS HIGH 50: CPT | Mod: GC | Performed by: INTERNAL MEDICINE

## 2017-06-28 PROCEDURE — 25000128 H RX IP 250 OP 636: Performed by: STUDENT IN AN ORGANIZED HEALTH CARE EDUCATION/TRAINING PROGRAM

## 2017-06-28 PROCEDURE — P9016 RBC LEUKOCYTES REDUCED: HCPCS | Performed by: INTERNAL MEDICINE

## 2017-06-28 PROCEDURE — 85610 PROTHROMBIN TIME: CPT | Performed by: STUDENT IN AN ORGANIZED HEALTH CARE EDUCATION/TRAINING PROGRAM

## 2017-06-28 PROCEDURE — 85730 THROMBOPLASTIN TIME PARTIAL: CPT | Performed by: STUDENT IN AN ORGANIZED HEALTH CARE EDUCATION/TRAINING PROGRAM

## 2017-06-28 PROCEDURE — 97602 WOUND(S) CARE NON-SELECTIVE: CPT

## 2017-06-28 PROCEDURE — 25000132 ZZH RX MED GY IP 250 OP 250 PS 637: Performed by: STUDENT IN AN ORGANIZED HEALTH CARE EDUCATION/TRAINING PROGRAM

## 2017-06-28 PROCEDURE — 83605 ASSAY OF LACTIC ACID: CPT | Performed by: PHYSICAL MEDICINE & REHABILITATION

## 2017-06-28 PROCEDURE — 25000132 ZZH RX MED GY IP 250 OP 250 PS 637: Performed by: INTERNAL MEDICINE

## 2017-06-28 PROCEDURE — 85027 COMPLETE CBC AUTOMATED: CPT | Performed by: STUDENT IN AN ORGANIZED HEALTH CARE EDUCATION/TRAINING PROGRAM

## 2017-06-28 PROCEDURE — 40000611 ZZHCL STATISTIC MORPHOLOGY W/INTERP HEMEPATH TC 85060: Performed by: STUDENT IN AN ORGANIZED HEALTH CARE EDUCATION/TRAINING PROGRAM

## 2017-06-28 PROCEDURE — 85045 AUTOMATED RETICULOCYTE COUNT: CPT | Performed by: STUDENT IN AN ORGANIZED HEALTH CARE EDUCATION/TRAINING PROGRAM

## 2017-06-28 PROCEDURE — 92507 TX SP LANG VOICE COMM INDIV: CPT | Mod: GN | Performed by: SPEECH-LANGUAGE PATHOLOGIST

## 2017-06-28 PROCEDURE — 40000193 ZZH STATISTIC PT WARD VISIT: Performed by: PHYSICAL THERAPIST

## 2017-06-28 PROCEDURE — 86901 BLOOD TYPING SEROLOGIC RH(D): CPT | Performed by: INTERNAL MEDICINE

## 2017-06-28 PROCEDURE — 83010 ASSAY OF HAPTOGLOBIN QUANT: CPT | Performed by: STUDENT IN AN ORGANIZED HEALTH CARE EDUCATION/TRAINING PROGRAM

## 2017-06-28 PROCEDURE — 86900 BLOOD TYPING SEROLOGIC ABO: CPT | Performed by: INTERNAL MEDICINE

## 2017-06-28 PROCEDURE — 80053 COMPREHEN METABOLIC PANEL: CPT | Performed by: STUDENT IN AN ORGANIZED HEALTH CARE EDUCATION/TRAINING PROGRAM

## 2017-06-28 RX ORDER — FUROSEMIDE 10 MG/ML
20 INJECTION INTRAMUSCULAR; INTRAVENOUS ONCE
Status: COMPLETED | OUTPATIENT
Start: 2017-06-28 | End: 2017-06-28

## 2017-06-28 RX ORDER — MIDODRINE HYDROCHLORIDE 5 MG/1
10 TABLET ORAL
Status: DISCONTINUED | OUTPATIENT
Start: 2017-06-29 | End: 2017-07-09

## 2017-06-28 RX ADMIN — PANTOPRAZOLE SODIUM 40 MG: 40 TABLET, DELAYED RELEASE ORAL at 05:37

## 2017-06-28 RX ADMIN — FOLIC ACID 1 MG: 1 TABLET ORAL at 08:40

## 2017-06-28 RX ADMIN — DIGOXIN 125 MCG: 125 TABLET ORAL at 08:40

## 2017-06-28 RX ADMIN — ACETAMINOPHEN 650 MG: 325 TABLET, FILM COATED ORAL at 00:13

## 2017-06-28 RX ADMIN — SULFAMETHOXAZOLE AND TRIMETHOPRIM 1 TABLET: 800; 160 TABLET ORAL at 20:25

## 2017-06-28 RX ADMIN — Medication: at 13:37

## 2017-06-28 RX ADMIN — MIDODRINE HYDROCHLORIDE 5 MG: 5 TABLET ORAL at 11:48

## 2017-06-28 RX ADMIN — INSULIN ASPART 1 UNITS: 100 INJECTION, SOLUTION INTRAVENOUS; SUBCUTANEOUS at 08:50

## 2017-06-28 RX ADMIN — MULTIPLE VITAMINS W/ MINERALS TAB 1 TABLET: TAB at 08:39

## 2017-06-28 RX ADMIN — PREDNISONE 10 MG: 10 TABLET ORAL at 08:39

## 2017-06-28 RX ADMIN — APIXABAN 2.5 MG: 2.5 TABLET, FILM COATED ORAL at 20:44

## 2017-06-28 RX ADMIN — SODIUM CHLORIDE, PRESERVATIVE FREE 5 ML: 5 INJECTION INTRAVENOUS at 20:25

## 2017-06-28 RX ADMIN — INSULIN ASPART 3 UNITS: 100 INJECTION, SOLUTION INTRAVENOUS; SUBCUTANEOUS at 11:48

## 2017-06-28 RX ADMIN — SODIUM CHLORIDE, PRESERVATIVE FREE 5 ML: 5 INJECTION INTRAVENOUS at 05:54

## 2017-06-28 RX ADMIN — Medication 30 MG: at 08:39

## 2017-06-28 RX ADMIN — SODIUM CHLORIDE, PRESERVATIVE FREE 5 ML: 5 INJECTION INTRAVENOUS at 16:02

## 2017-06-28 RX ADMIN — Medication 25000 UNITS: at 08:40

## 2017-06-28 RX ADMIN — FUROSEMIDE 5 MG/HR: 10 INJECTION, SOLUTION INTRAVENOUS at 13:01

## 2017-06-28 RX ADMIN — OXYCODONE HYDROCHLORIDE AND ACETAMINOPHEN 500 MG: 500 TABLET ORAL at 08:40

## 2017-06-28 RX ADMIN — ACETAMINOPHEN 650 MG: 325 TABLET, FILM COATED ORAL at 20:25

## 2017-06-28 RX ADMIN — GABAPENTIN 200 MG: 100 CAPSULE ORAL at 08:40

## 2017-06-28 RX ADMIN — FUROSEMIDE 20 MG: 10 INJECTION, SOLUTION INTRAVENOUS at 11:48

## 2017-06-28 RX ADMIN — INSULIN ASPART 2 UNITS: 100 INJECTION, SOLUTION INTRAVENOUS; SUBCUTANEOUS at 18:20

## 2017-06-28 RX ADMIN — GABAPENTIN 200 MG: 100 CAPSULE ORAL at 20:25

## 2017-06-28 RX ADMIN — GABAPENTIN 200 MG: 100 CAPSULE ORAL at 14:02

## 2017-06-28 RX ADMIN — SULFAMETHOXAZOLE AND TRIMETHOPRIM 1 TABLET: 800; 160 TABLET ORAL at 08:39

## 2017-06-28 RX ADMIN — ACETAMINOPHEN 650 MG: 325 TABLET, FILM COATED ORAL at 14:08

## 2017-06-28 RX ADMIN — MIDODRINE HYDROCHLORIDE 5 MG: 5 TABLET ORAL at 18:20

## 2017-06-28 RX ADMIN — Medication 220 MG: at 08:40

## 2017-06-28 RX ADMIN — CALCIUM CARBONATE 600 MG (1,500 MG)-VITAMIN D3 400 UNIT TABLET 2 TABLET: at 08:40

## 2017-06-28 RX ADMIN — ATENOLOL 50 MG: 50 TABLET ORAL at 08:40

## 2017-06-28 RX ADMIN — MIDODRINE HYDROCHLORIDE 5 MG: 5 TABLET ORAL at 08:39

## 2017-06-28 RX ADMIN — SODIUM CHLORIDE, PRESERVATIVE FREE 5 ML: 5 INJECTION INTRAVENOUS at 14:02

## 2017-06-28 ASSESSMENT — PAIN DESCRIPTION - DESCRIPTORS
DESCRIPTORS: DISCOMFORT
DESCRIPTORS: NUMBNESS;SORE

## 2017-06-28 NOTE — PROGRESS NOTES
[facility] Nurse Inpatient Wound Assessment     Re Assessment of wound(s) on pt's:   Left arm    Data:     Patient History:      per MD note(s):  56 year old woman with hx of VSD repair (1969), Rheumatoid arthritis, and a recent diagnosis of Afib/Aflutter/SVT, who presented to [facility] on 5/29 after Out of Hospital cardiac arrest with shockable rhythm. After ROSC in the field, she was admitted from 5/29-6/15, admitted to ARU 6/15.  admitted from ARU on 6/19 with fever and concern for worsening LUE infection. She is now transferred back to cardiology service as primary on 6/26 for further workup and evaluation of recurrent lactic acidosis, persistent afib and softer blood pressures with tachypnea.     left arm extravasation site    Moisture Management:  Dressings    Current Diet / Nutrition:         Active Diet Order      Regular Diet Adult Thin Liquids (water, ice chips, juice, milk, gelatin, ice cream, etc)        Tube feeding       Jay Score  Avg: 15.6  Min: 9  Max: 23   Recent Labs   Lab Test  06/28/17   0555   06/22/17   0653   06/20/17   0709   05/31/17   1018   ALBUMIN  1.5*   < >   --    --    --    < >  1.9*   HGB  6.9*   < >  7.9*   < >  8.7*   < >  8.3*   INR   --    --   1.33*   < >  1.29*   < >  3.38*   WBC  2.4*   < >  2.5*   < >  2.8*   < >  7.5   A1C   --    --    --    --    --    --   Canceled, Test credited   UNABLE TO CALCULATE % HBA1C DUE TO LOW HGB AND/OR LOW HGBA1C  NOTIFIED CHELSEA BEE RN AT 1253 ON 5/31/17 [facility]     CRP   --    --    --    --   110.0*   < >   --     < > = values in this interval not displayed.     Wound Assessment :   Left arm  Wound History:  Extravasation wound with large area of epidermal loss           6/13/17 photo                                                    6/20/17 6/28/17      Wound Base: 90% adherent tan slough/necrosis surrounded by 10% thin yellow slough.  New epithelium at edges    Specific Dimensions (length x width x depth, in  cm) :   10 x 2.8 x 0.1 cm     Palpation of the wound bed:  edema    Periwound Skin: intact, minimal pink erythema     Drainage:  . Amount: moderate . Color: serous     Odor: none    Pain:  Decreased since last week but still moderate.  Using Morphine gel at wound edges which is helping.           Intervention:     Patient's chart evaluated.      Wound(s) was assessed    Wound Care: was done: changed dressing, Visual inspection    Orders reviewed    Discussed plan of care with  Patient,  and nurse, including suggestion of more aggressive debridement of necrotic tissue.            Assessment:       Large area of epidermal loss with potential full thickness skin loss at area of necrosis 2/2 extravastion.  Width slightly increased most likely 2/2 increased edema in entire UE.  Decreased erythema and pain, cellulitis appears to be resolving.  Necrotic tissue moister today, appears to be very slowly debriding.       Pt prefers to continue with current Polymem dressings instead of using a more aggressive dressing which may increase pain.          Plan:     Nursing to notify the Provider(s) and re-consult the [facility] Nurse if wound(s) deteriorate(s).    Plan of care for wound located on left arm: Change entire dressing every other day, clean skin around wound with Microklenz.  DO NOT clean wound-dressing is designed to treat and clean wound. Cover wound with Polymem foam, ABD, secure with Kerlix and loosely wrapped ACE wrap    Three time a day: unwrap dressing and apply Morphine gel to wound edges, avoiding the brown necrotic areas    WO Nurse will return: weekly     Face to face time: 20 minutes

## 2017-06-28 NOTE — PLAN OF CARE
Problem: Goal Outcome Summary  Goal: Goal Outcome Summary  Outcome: Improving     D: Hx VSD repair (1969), RA, afib/flutter/SVT. Recently admitted 5/29-6/15  after an OOH cardiac arrest. Returned to hospital 6/19 with LUE wound pain and fever.   I/A: BPs soft but stable (80s/60s), on 2L O2 overnight. Monitor shows aflutter. Pt reports R leg pain controlled with prn tylenol x1. TF running at 40 ml/hr through NG. Huynh with adequate UO. Rectal tube with little output. Turned/repositioned q2h and mary care prn. Lymph wraps on BLE. BG stable overnight, no SSI given. Calorie counts in place. Pt triggered sepsis protocol this am, LA 1.9. Sleeping between cares.  P: Continue to monitor and notify Cards 1 with pertinent changes.

## 2017-06-28 NOTE — PROGRESS NOTES
CARDIOLOGY PROGRESS NOTE    SUBJECTIVE:  Mrs. Michel feels dyspneic. Had RHC on 06/26 with mildly elevated right- and left-sided filling pressures. Still struggling to eat.     ROS otherwise negative.    OBJECTIVE:  Vital signs:  92/57 (Range /42-82)  120 (-125)  20 (RR 18-29)  SpO2  with 4L/min  97.7 (Tm 98.3)  190 lbs 0 oz (190 lbs 06/26)    I/O: 1465 mL  Intake: 1575 intake  Output: 110 output     PHYSICAL EXAM:  Gen: Looks dyspneic  HEENT: MMM, NGT, NC   Resp: Tachypenic with coarse crackles anteriorly  CVS: JVP to the angle of the mandible, S1+S2 with PSM  Abdo: ND, S, NT, +BS  Extremities: Significant peripheral edema but warm and well-perfused  Neuro: GCS 15/15    Recent Labs   Lab Test  06/27/17   0826   HGB  7.7*   HCT  22.3*   WBC  2.8*   MCV  94   MCH  32.4   MCHC  34.5   RDW  21.6*   PLT  38*   NA  138   POTASSIUM  4.7   CHLORIDE  110*   CO2  21   BUN  39*   CR  0.65   GLC  184*   VIKTORIYA  7.3*   ALBUMIN  1.4*   BILITOTAL  0.6   ALKPHOS  129   AST  54*   ALT  60*       Cardiac meds:  Atenolol 50 mg q24h, digoxin 125 micrograms q24h,   Non-cardiac meds: Beta carotene, calcium carbonate, calcium-vit D, coQ, ertapenem, gabapentin, insulin, transdermal morphine, pantoprazole, prednisone   Drips: HAS     ASSESSMENT/PLAN:  Mrs. Amelia Michel is a 56-year old lady with a PMHx of RA and a recent diagnosis of AFib that was complicated by OOH cardiac arrest (thought to be caused by a lengthy pause with degeneration into VFib when converting from AFib to sinus rhythm int he setting of multiple prieto agents) who presented with fever.        - Hypotension and lactic acidosis    - Start midodrine 2.5 mg TID and uptitrate as BP permits    - Administer human albumin solution 5% 250 mL and follow with furosemide 20 mg    - Continue diuresis as volume permits     - Malnutrition and diarrhea ?from malabsorption/ABx   - Nutrition consult     - Sepsis due to LUE wound   - Bactrim started 06/27 after ertapenem  stopped     - AFib   - Continue atenolol 50 mg q24h and digoxin 125 micrograms q24h    - Apixaban 2.5 mg BID                            - Acute on chronic thrombocytopenia   - transfusion goal of 30     - RA   - Continue prednisone 10 mg q24h    - Prednisone to 7.5 mg q24h on 06/29 x 14 days   - Then prednisone 5 mg q24h    - Hold hydroxychloroquine and methotrexate    - Critical illness myopathy   - Monitor symptoms     Seen and staffed with Dr. Trent.    Gilbert Og  Cardiology Fellow  Pager: [phone]    I very much appreciated the opportunity to see and assess Mrs Amelia Michel in the hospital with CV Fellow Dr Og and resident team.     Today I spent 20 min reviewing treatment strategy given her poor diuresis and discussing same with patient and spouse. I addressed their questions as best as possible.    I agree with the note above which summarizes my findings and current recommendations.  Please do not hesitate to contact my office if you have any questions or concerns.      Pj Trent MD  Cardiac Arrhythmia Service  [facility]  [phone]

## 2017-06-28 NOTE — PLAN OF CARE
Problem: Goal Outcome Summary  Goal: Goal Outcome Summary  OT-6C: Cancel. Pt sleeping upon attempt and just returned from chair. Will reschedule.

## 2017-06-28 NOTE — PROGRESS NOTES
Cardiology Daily Note   Date of Service: 6/26/2017  Patient: Amelia Michel  MRN: [id number]  Admission Date: 6/19/2017  Hospital Day # 9    Assessment & Plan:   Amelia Michel is a 56 year old female with PMHx of VSD s/p repari (1969), RA, recently diagnosed Atrial fibrillation that was complicated with an OOH cardiac arrest (felt 2/2 long pause with degeneration to VFib while converting Afib to SR with multiple AV prieto agents) who was readmitted from ARU on 6/19 with fever and concern for worsening LUE infection. She is now transferred back to cardiology service as primary on 6/26 for further workup and evaluation of recurrent lactic acidosis, persistent afib and softer blood pressures with tachypnea.        Changes Today:  - Hgb 6.9 -> transfuse 1u PRBC  - Lasix 20mg IV x1 followed by gtt at 5mg/hr for 8 hours  - Strict I/Os, monitor response to above  - PM lytes check     # Volume overload/anasarca:  # HFrEF (EF 50-55%):  # Hypotension:  Patient is up approximately 35lbs from 6/13/17 (157lbs -> 190lbs) over the coarse of her complicated hospitalization for OOH arrest, subsequent visit at the ARU and now readmission for evaluation of possible LUE infection and sepsis. She is severely malnourished and has an albumin of 1.9 which would suggest third spacing of the majority of her fluid making her a challenging candidate for diuresis. Over the past 24 -48 hours she has had softer blood pressures with systolics 80-90s. Suspect she is total body volume up and intravascularly deplete.  - Right heart cath 6/26 with mildly elevated right and left sided filling presures  - Moderate UOP with 20mg IV lasix x1 (6/27)  - Diuretics: Lasix 20mg IV x1 followed by gtt at 5mg/hr 6/28  - Cardiology heart failure consult, appreciate recs    # Atrial fibrillation/flutter with RVR:  RVR in setting of volume overload above.  - Continue dig 125mcg  - Continue atenolol 50mg daily  - Management of volume overload as above  -  Hold apixaban 6/26-6/27, will resume as appropriate    # Out of hospital arrest:  - Pt will need to wear LifeVest until medically appropriate and cleared from infection/wound standpoint for ICD implantation likely 4-6 weeks after discharge per EP (pending hospital course for LUE wound)    # LUE wound and soft tissue infection:  Most consistent with cellulitis. Wound culture on 6/19 grew Enterobacter cloacae complex, Enterococcus faecalis and coagulase negative staphylococcus.  - ID following appreciate recs   - Rec to DC ertapenem and start bactrim 1DS q12h for LUE wound (will defer until assessment of SVR with RHC given concern lactic acidosis may be related to sepsis of other unclear source)  - 6/19 L Elbow XR:  Negative for osteo, diffuse subcutaneous soft tissue stranding, possibly representative of cellulitis   - 6/19 L Elbow MRI: Diffuse cellulitis and multicompartment soft tissue tissue edema within the muscles about the left elbow, with a focal area of nonenhancement,  centered in the brachialis muscle. Differential includes myositis,including infectious or inflammatory etiology. A focal area of myonecrosis is likely within the brachialis muscle. Correlate clinically.  --General Surgery: 06/19: no surgical indication.   --Pain control: Tramadol 25mg q6h prn, Continue morphine gel around edges of wound for pain   - WOCN    # Lactic acidosis: Resolved  # SIRS (Tachycardia, leukopenia):  Patient with recurrent lactic acidosis, last 2.8 -> 2.3 (mildly elevated). DDx sepsis, cardiogenic shock (patient work, EF only mildly reduced), no e/o limb ischemia, DKA, or seizure. She has been afebrile, her procal is elevated but improving. Her CXR does have basilar opacities with question for possible PNA which may be a potential source. Blood cultures with no growth.  - Pan culture 6/26   - BCx (6/26): In process   - UA/UCx:   - Enteric panel negative  - Wound culture (left upper extremity, 6/19): Heavy growth  Enterobacter cloacae complex, Moderate growth Enterococcus faecalis, Moderate growth Coagulase negative Staphylococcus       # Acute on Chronic Thrombocytopenia:   2/2 hyporoliferative bone marrow. Plts noted to decrease from 104 to as low as 15 during recent hospitalization requiring transfusion w/ chronically low in the 's as outpatient.  -- If this is chronic ITP, no need for treatment unless plts drift < 30s Transfused 1u plts 6/26 for plts 25  -- Continue to hold RA meds for now  -- Held Apixaban 2.5 mg BID (6/26-6/27). This will need to be held 2 days prior to ICD placement after discharge (this will be deferred given acute infection).    # Acute on chronic anemia:  Hemoglobin drop from 8.8 (6/26) --> 6.9. No evidence of overt bleeding (stool light brown). T Bili normal.  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 Now on 10 mg daily, taper to 7.5 mg on 6/29, then to 5 mg daily after 2 weeks if continues to have low disease activity  -- Continue to hold HCQ and MTX until outpatient follow-up  -- Follow-up with [facility] Dr. Conor Cunha in 4-6 weeks after discharge      # Severe Critical Illness Myopathy:   Significant deconditioning w/ median neuropathies most likely localized to the wrists and ulnar neuropathies most likely localized to the elbows secondary to RA.  -- Ongoing extensive PT/OT/SLP      # Malnutrition:   -- Nutrition recommendations are to continue Peptamen 1.5 Tube feeds to 40 ml/hr x 11 hours which will provide 440 ml TF, 660 kcals, and 30 g protein daily. TFs may be adjusted pending oral intake. Once kcal counts reveal that patient is consuming at  "least 900 kcals and 45 g protein daily on average can discontinue.  -- Water flushes as needed.  -- Nutrition on board.     Consulting Services: Nutrition, ID, heart failure service    CODE: Full Code  DVT Ppx: Apixaban (hold 6/26)  Diet/Fluids: As above.  Disposition: Pending improvement in above.    Pt's care was discussed with bedside RN and patient during Care Team Rounds.    Patient was discussed and evaluated with Dr. Trent.    Evie Longo MD PGY2  Internal Medicine Resident  Pager: [phone]    ___________________________________________________________________    Subjective & Interval History:     Last 24 hr care team notes reviewed. No acute events overnight. This morning feels that she is slightly improved. Does not feel that her shortness of breath is bothering her significantly. Denies any new pain, does continue to have some low abdominal cramping, worse with bactrim. , Wolf, at bedside updated with the plan.    Medications: Reviewed in EPIC. List below for reference    Physical Exam:    Blood pressure 95/61, pulse 120, temperature 97.6  F (36.4  C), temperature source Oral, resp. rate 16, height 1.48 m (4' 10.25\"), weight 86 kg (189 lb 8 oz), SpO2 98 %, not currently breastfeeding.    CONSTITUTIONAL: Ms. Michel is lying down in bed, she appears more comfortable.  HEENT:  NC/AT.  OP Clear.  MMM. PERRLA.  EOMI.   NECK: Supple, no cervical LAD, JVD to the angle of the jaw while lying thirty degrees.  LUNGS: Mildly tachypneic, no increased work of breathing. Lungs clear in anterior fields. No wheezes appreciated.  CARDIOVASCULAR: Irregularly irregular w/ no M/R/G.   ABDOMEN: Soft, moderately distended (edematous abdominal wall), NT, BS +ve.   MUSCULOSKELETAL:  Moves all four extremities without difficulty.  DP pulses intact.  Diffuse anasarca.  NEURO: A&Ox3, CN II-XII grossly intact, non-focal.   PSYCHIATRIC:  Normal affect.  SKIN: Warm, Dry, No rashes.     Lines/Tubes: Rectal tube, brand " catheter  PICC Triple Lumen 06/26/17 Right Basilic ok to use. (Active)   Site Assessment WDL 6/26/2017  1:29 PM   External Cath Length (cm) 3 cm 6/26/2017  1:29 PM   Extremity Circumference (cm) 35 cm 6/26/2017  1:29 PM   Dressing Intervention Chlorhexidine patch;Transparent;Securing device;New dressing 6/26/2017  1:29 PM   Dressing Change Due 07/03/17 6/26/2017  1:29 PM   PICC Lumen Assessment Trigger Red;White;Gray 6/26/2017  1:29 PM   Number of days:0       Labs & Studies of Note: Reviewed in Epic  CMP    Recent Labs  Lab 06/28/17  0555 06/27/17  0826 06/26/17  0647 06/25/17  0710 06/24/17 2221 06/24/17 0623    138 134 133 132*  --    POTASSIUM 4.2 4.7 4.8 4.6 4.9 4.9   CHLORIDE 109 110* 106 106 106  --    CO2 23 21 17* 18* 18*  --    ANIONGAP 6 7 10 10 8  --    * 184* 166* 143* 151*  --    BUN 43* 39* 42* 47* 47*  --    CR 0.65 0.65 0.72 0.85 0.85  --    GFRESTIMATED >90Non  GFR Calc >90Non  GFR Calc 84 69 69  --    GFRESTBLACK >90African American GFR Calc >90African American GFR Calc >90African American GFR Calc 83 84  --    VIKTORIYA 7.4* 7.3* 7.5* 7.3* 7.1*  --    MAG  --   --  2.1 2.4* 1.9 1.9   PHOS  --   --   --   --  3.4  --    PROTTOTAL 3.9* 4.2*  --   --   --   --    ALBUMIN 1.5* 1.4*  --   --   --   --    BILITOTAL 0.8 0.6  --   --   --   --    ALKPHOS 126 129  --   --   --   --    AST 41 54*  --   --   --   --    ALT 50 60*  --   --   --   --        CBC    Recent Labs  Lab 06/28/17  0555 06/27/17  0826 06/26/17  0647 06/25/17  0710   WBC 2.4* 2.8* 3.2* 3.3*   RBC 2.11* 2.38* 2.79* 2.56*   HGB 6.9* 7.7* 8.8* 8.3*   HCT 20.0* 22.3* 26.3* 23.9*   PLT 27* 38* 25* 30*       INR    Recent Labs  Lab 06/22/17  0653   INR 1.33*       Unresulted Labs Ordered in the Past 30 Days of this Admission     Date and Time Order Name Status Description    6/26/2017 0935 Blood culture Preliminary     6/26/2017 0935 Blood culture Preliminary     5/29/2017 1046 Adalimumab Activity  and Neutralizing Antibodies: Laboratory Miscellaneous Order In process           Medication List for Reference (delete if desired)  Current Facility-Administered Medications   Medication     midodrine (PROAMATINE) tablet 5 mg     sulfamethoxazole-trimethoprim (BACTRIM DS/SEPTRA DS) 800-160 MG per tablet 1 tablet     lidocaine (LMX4) kit     calcium carbonate (TUMS) chewable tablet 500 mg     pantoprazole (PROTONIX) EC tablet 40 mg     digoxin (LANOXIN) tablet 125 mcg     ondansetron (ZOFRAN) tablet 4 mg     prochlorperazine (COMPAZINE) injection 5-10 mg     ondansetron (ZOFRAN) injection 4 mg     simethicone (MYLICON) chewable tablet 80 mg     sodium chloride (PF) 0.9% PF flush 10-20 mL     sodium chloride (PF) 0.9% PF flush 10 mL     heparin lock flush 10 UNIT/ML injection 5-10 mL     heparin lock flush 10 UNIT/ML injection 5-10 mL     dextrose 10 % 1,000 mL infusion     heparin lock flush 10 UNIT/ML injection 2-5 mL     ascorbic acid (VITAMIN C) tablet 500 mg     atenolol (TENORMIN) tablet 50 mg     beta carotene capsule 25,000 Units     folic acid (FOLVITE) tablet 1 mg     gabapentin (NEURONTIN) capsule 200 mg     melatonin tablet 3 mg     morphine 0.1% in solosite topical gel 2 g     multivitamin, therapeutic with minerals (THERA-VIT-M) tablet 1 tablet     triamcinolone (KENALOG) 0.1 % ointment     zinc sulfate (ZINCATE) capsule 220 mg     lidocaine 1 % 1 mL     lidocaine (LMX4) kit     sodium chloride (PF) 0.9% PF flush 3 mL     sodium chloride (PF) 0.9% PF flush 3 mL     medication instruction     acetaminophen (TYLENOL) tablet 650 mg     acetaminophen (TYLENOL) Suppository 650 mg     Patient is already receiving anticoagulation with heparin, enoxaparin (LOVENOX), warfarin (COUMADIN)  or other anticoagulant medication     glucose 40 % gel 15-30 g    Or     dextrose 50 % injection 25-50 mL    Or     glucagon injection 1 mg     insulin aspart (NovoLOG) inj (RAPID ACTING)     insulin aspart (NovoLOG) inj (RAPID  ACTING)     naloxone (NARCAN) injection 0.1-0.4 mg     calcium-vitamin D (CALTRATE) 600-400 MG-UNIT per tablet 2 tablet     co-enzyme Q-10 capsule 30 mg     morphine 0.1% in solosite topical gel     potassium chloride SA (K-DUR/KLOR-CON M) CR tablet 20-40 mEq     potassium chloride (KLOR-CON) Packet 20-40 mEq     potassium chloride 10 mEq in 100 mL intermittent infusion     potassium chloride 10 mEq in 100 mL intermittent infusion with 10 mg lidocaine     potassium chloride 20 mEq in 50 mL intermittent infusion     magnesium sulfate 2 g in NS intermittent infusion (PharMEDium or FV Cmpd)     magnesium sulfate 4 g in 100 mL sterile water (premade)     sodium phosphate 10 mmol in D5W intermittent infusion     sodium phosphate 15 mmol in D5W intermittent infusion     sodium phosphate 20 mmol in D5W intermittent infusion     sodium phosphate 25 mmol in D5W intermittent infusion     melatonin tablet 1 mg     traMADol (ULTRAM) half-tab 25 mg     predniSONE (DELTASONE) tablet 10 mg       I very much appreciated the opportunity to see and assess Mrs Amelia Michel in the hospital today 6/28 with CV Fellow Dr Og  and resident Dr Longo. Today we saw some diuresis progress after albumin and lasix. She is alert and was undergoing PT when seen. Her  was present and we discussed plan to transfuse given her Hgb of 6.9. He agreed. We will add lasix with the transfusion.     I agree with the note above which summarizes my findings and current recommendations.  Please do not hesitate to contact my office if you have any questions or concerns.      Pj Trent MD  Cardiac Arrhythmia Service  [facility]  [phone]

## 2017-06-28 NOTE — PLAN OF CARE
Problem: Goal Outcome Summary  Goal: Goal Outcome Summary  PT/6c: Pt w/ limited activity tolerance d/t shortness of breath, fatigue, total body edema, and significant weakness. Pt requiring min A for initiating rolling progresses to CGA-SBA. Pt requiring dependent A to transfer bed > chair w/ use of universal sling. Recommend pt disch to rehab facility (TCU) to improve her strength, activity tolerance, and mobility.  Due to limited activity tolerance at this time, pt does not appear to be appropriate for ARU.

## 2017-06-28 NOTE — PLAN OF CARE
Problem: Goal Outcome Summary  Goal: Goal Outcome Summary  SLP: Pt seen at bedside for f/u language tx. Pt progressing, story comprehension reported less effortful, pt required minimal cueing. Word finding and storytelling continue mildly impaired, required moderate cues to reach 90% success. SLP to drop off word finding activity this PM. SLP to follow per POC.

## 2017-06-28 NOTE — PROGRESS NOTES
Calorie Counts    Intake recorder for: 6/27 Kcals:76  Protein: 2g    # Meals Recorded: 100% 4 oz apple juice, 10% pears, 5% rice chex with 1% milk    # Supplements recorded: 0

## 2017-06-28 NOTE — PLAN OF CARE
Problem: Goal Outcome Summary  Goal: Goal Outcome Summary  Outcome: No Change  VSS, Aflutter 120s.  A&Ox4, on 2 L NC.  BPs 80s-90s/60s.  Lifevest in place, with backup battery at bedside.  No complaints of pain.  Hgb 6.9 this AM.  1 unit RBC given.  Recheck Hgb 9.2.  20 mg IV lasix given, with lasix gtt at 5 ml/hr running for 8 hours.  Lasix gtt to be done at 2000.  Continuous TF infusing at 40 ml/hr.  Activity and oral intake encouraged.  Chalo counts continue through today.  Huynh and rectal tube in place, both patent and draining.  Pt up w/2 to recliner for 3 hours this afternoon.  WOC nurse completed dressing change for LUE wound.  PRN tylenol given x1 for dressing change.  Turned/repositioned q2h.  Blood sugars assessed and managed with SSI.  Will continue to monitor and notify Cards 1 with any concerns or questions.

## 2017-06-28 NOTE — PLAN OF CARE
Problem: Goal Outcome Summary  Goal: Goal Outcome Summary  Edema 6C: Compression wraps adjusted to R LE with full leg compression. Patient with questions regarding use of MLD for fluid movement however discussed with patient this technique would be contraindicated with low blood pressure. BLE compression wraps will be changed and alternated with full leg compression on 6/29 per wear schedule. Remove wraps if causing pain/numbness or become soiled.

## 2017-06-28 NOTE — PLAN OF CARE
Problem: Goal Outcome Summary  Goal: Goal Outcome Summary  Outcome: Improving  D: Admit 6/19 with LUE wound pain/fever.   I/A: Alert, oriented. On O2 4L. BPs improving (90s/70s); on Midodrine to increase BP. Tube feeding started continuously at 40ml/hr at 1400. Worked with OT/PT today; CARDS 2 encouraged increased activity as tolerated. Lasix IV x1; good urine output following administration. Rectal tube placed; continued loose stools. Brand patent: brand cares completed x2. Lymph wraps changed per lymphedema. BS 140s-190s; coverage given. CARDS 1 notified of lactic acid at 1300; 2.2; no further instruction. LUE dressing completed. Rhythm: A Flutter. PICC line dressing changed; 3 lumens hep locked. Slight skin breakdown noted around Life Vest; patient declined any intervention.   P: Update CARDS 1 if questions/concerns. Plan to continue diuretics, monitor blood pressure, continue cont TF, and encourage activity as able.   Juanita Trivedi RN

## 2017-06-28 NOTE — PROGRESS NOTES
"Cardiology Progress Note    Events and interval changes in past 24 hours:   No new issues    OBJECTIVE FINDINGS:  BP 91/67  Pulse 120  Temp 99.2  F (37.3  C) (Axillary)  Resp 20  Ht 1.48 m (4' 10.25\")  Wt 86.2 kg (190 lb)  SpO2 98%  BMI 39.37 kg/m2    Gen: Patient is AOx3, in NAD. Appears comfortable.    CV: tachy, regular, no murmurs,5mm pitting edema from feet to thighs, 3mm pitting edema in hands  Lungs: decreased breath sounds in right base. Otherwise clear      Intake/Output Summary (Last 24 hours) at 06/27/17 1922  Last data filed at 06/27/17 1847   Gross per 24 hour   Intake             1895 ml   Output             1520 ml   Net              375 ml     Wt Readings from Last 5 Encounters:   06/27/17 86.2 kg (190 lb)   06/19/17 75.7 kg (166 lb 12.8 oz)   06/16/17 72.8 kg (160 lb 8 oz)   05/23/17 64.4 kg (141 lb 15.6 oz)   04/04/17 61.6 kg (135 lb 12.8 oz)         Labs  Reviewed in epic    ECG aflutter vs atrial tachycardia. ECG from 5/29 shows atrial paced v senese rhythm with prolonged QTc (she had temporary pacer at the time)   Cardiac MRI 5/15 normal LV and RV EF    CXR small right pleural effusion    ASSESSMENT:  56 year old lady with advanced RA (on prednisone, MTX, plaquenil), HTN, Obesity, New Afib RVR presentation 5/15-5/19- failed DCCV, didn't tolerated sotalol, discharged on BB and amiodarone, Out of hospital arrest from shock able rhythm with ROSC hospitalzation 5/29 to 6/15, normal CORS 5/29 c/b respiratory failure requiring mechanical ventilation, bone marrow failure, renal failure, shock liver, ESBL E. Coli UTI, malnutrition, discharged on Lifevest, admitted on 6/19 for increasing left upper arm wound and fever.     # Anasarca- from 3rd spacing from low albumin and some component of intravascular hypervolemia  # Mild LV dysfunction EF 40-45%-query tachycardia mediated  # Afib/aflutter with RVR -120s  # Hypotension issues recently with rate controlling agents, diuretics etc.  # LUE wound " pain/fever reason for hospitalization this time 6/19     # New Afib RVR presentation 5/15-5/19- failed DCCV, didn't tolerated sotalol, discharged on BB and amiodarone  # Out of hospital arrest from shock able rhythm with ROSC hospitalzation 5/29 to 6/15, normal CORS 5/29 c/b respiratory failure requiring mechanical ventilation, bone marrow failure, renal failure, shock liver, ESBL E. Coli UTI, malnutrition, discharged on Lifevest,  # Thrombocytopenia-from RA immunosuppressants vs mild chronic ITP    Recommendations  -Consider Midodrine  - Lasix with albumin    Staffed with Dr. Zack Prieto  General cards fellow, PGY4  Pager [phone]

## 2017-06-28 NOTE — PROGRESS NOTES
Social Work Services Progress Note    Hospital Day: 10  Date of Initial Social Work Evaluation:  6/20/17  Collaborated with:  Cards 1 team, Pt, Spouse    Data:  [facility] a 56 [facility]  Pt was then transferred to 5th floor medicine team.  Pt is now a return to  and Cards 1 team for further cardiac work up.      Intervention:  SW met with spouse to assist with completing [employer] FMLA form.  This was submitted by fax.  SW checked in with pt.  Plan is to still pursue ARU.  No other needs at this time.  Pt not medically stable for transition back.    Assessment:  FMLA updated per spouse's request.    Plan:    Anticipated Disposition:  Facility:  Return to [facility] as able.    Barriers to d/c plan:  Medical stability    Follow Up:  SW follow for discharge planning.    DENNIS Larsen, APSW  6C Unit   Phone: [phone]  Pager: [phone]  Unit: [phone]      ___________________________________________________________________________________________________________________________________________________    Referrals in process:    ARU - return when medically stable.     Referrals Discontinued:  None    Community Case Management/Community Services in place:   None

## 2017-06-29 ENCOUNTER — APPOINTMENT (OUTPATIENT)
Dept: OCCUPATIONAL THERAPY | Facility: CLINIC | Age: 57
DRG: 871 | End: 2017-06-29
Attending: INTERNAL MEDICINE
Payer: COMMERCIAL

## 2017-06-29 ENCOUNTER — APPOINTMENT (OUTPATIENT)
Dept: PHYSICAL THERAPY | Facility: CLINIC | Age: 57
DRG: 871 | End: 2017-06-29
Attending: INTERNAL MEDICINE
Payer: COMMERCIAL

## 2017-06-29 LAB
ANION GAP SERPL CALCULATED.3IONS-SCNC: 6 MMOL/L (ref 3–14)
BUN SERPL-MCNC: 45 MG/DL (ref 7–30)
CALCIUM SERPL-MCNC: 7.2 MG/DL (ref 8.5–10.1)
CHLORIDE SERPL-SCNC: 108 MMOL/L (ref 94–109)
CO2 SERPL-SCNC: 22 MMOL/L (ref 20–32)
COPATH REPORT: NORMAL
CREAT SERPL-MCNC: 0.72 MG/DL (ref 0.52–1.04)
ERYTHROCYTE [DISTWIDTH] IN BLOOD BY AUTOMATED COUNT: 21.4 % (ref 10–15)
GFR SERPL CREATININE-BSD FRML MDRD: 84 ML/MIN/1.7M2
GLUCOSE BLDC GLUCOMTR-MCNC: 165 MG/DL (ref 70–99)
GLUCOSE BLDC GLUCOMTR-MCNC: 168 MG/DL (ref 70–99)
GLUCOSE BLDC GLUCOMTR-MCNC: 204 MG/DL (ref 70–99)
GLUCOSE BLDC GLUCOMTR-MCNC: 219 MG/DL (ref 70–99)
GLUCOSE BLDC GLUCOMTR-MCNC: 247 MG/DL (ref 70–99)
GLUCOSE SERPL-MCNC: 162 MG/DL (ref 70–99)
HAPTOGLOB SERPL-MCNC: 139 MG/DL (ref 15–200)
HCT VFR BLD AUTO: 26.3 % (ref 35–47)
HGB BLD-MCNC: 8.9 G/DL (ref 11.7–15.7)
LACTATE BLD-SCNC: 1.8 MMOL/L (ref 0.7–2.1)
MCH RBC QN AUTO: 30.8 PG (ref 26.5–33)
MCHC RBC AUTO-ENTMCNC: 33.8 G/DL (ref 31.5–36.5)
MCV RBC AUTO: 91 FL (ref 78–100)
PLATELET # BLD AUTO: 27 10E9/L (ref 150–450)
POTASSIUM SERPL-SCNC: 4.1 MMOL/L (ref 3.4–5.3)
RBC # BLD AUTO: 2.89 10E12/L (ref 3.8–5.2)
SODIUM SERPL-SCNC: 136 MMOL/L (ref 133–144)
WBC # BLD AUTO: 3 10E9/L (ref 4–11)

## 2017-06-29 PROCEDURE — 97530 THERAPEUTIC ACTIVITIES: CPT | Mod: GP | Performed by: PHYSICAL THERAPIST

## 2017-06-29 PROCEDURE — 40000558 ZZH STATISTIC CVC DRESSING CHANGE

## 2017-06-29 PROCEDURE — 36592 COLLECT BLOOD FROM PICC: CPT | Performed by: PHYSICAL MEDICINE & REHABILITATION

## 2017-06-29 PROCEDURE — 97110 THERAPEUTIC EXERCISES: CPT | Mod: GP | Performed by: PHYSICAL THERAPIST

## 2017-06-29 PROCEDURE — P9047 ALBUMIN (HUMAN), 25%, 50ML: HCPCS | Performed by: STUDENT IN AN ORGANIZED HEALTH CARE EDUCATION/TRAINING PROGRAM

## 2017-06-29 PROCEDURE — 97110 THERAPEUTIC EXERCISES: CPT | Mod: GO | Performed by: OCCUPATIONAL THERAPIST

## 2017-06-29 PROCEDURE — 40000193 ZZH STATISTIC PT WARD VISIT: Performed by: PHYSICAL THERAPIST

## 2017-06-29 PROCEDURE — 97140 MANUAL THERAPY 1/> REGIONS: CPT | Mod: GO | Performed by: OCCUPATIONAL THERAPIST

## 2017-06-29 PROCEDURE — 25000125 ZZHC RX 250: Performed by: INTERNAL MEDICINE

## 2017-06-29 PROCEDURE — 85027 COMPLETE CBC AUTOMATED: CPT | Performed by: STUDENT IN AN ORGANIZED HEALTH CARE EDUCATION/TRAINING PROGRAM

## 2017-06-29 PROCEDURE — 40000802 ZZH SITE CHECK

## 2017-06-29 PROCEDURE — 25000132 ZZH RX MED GY IP 250 OP 250 PS 637: Performed by: STUDENT IN AN ORGANIZED HEALTH CARE EDUCATION/TRAINING PROGRAM

## 2017-06-29 PROCEDURE — 00000146 ZZHCL STATISTIC GLUCOSE BY METER IP

## 2017-06-29 PROCEDURE — 80048 BASIC METABOLIC PNL TOTAL CA: CPT | Performed by: STUDENT IN AN ORGANIZED HEALTH CARE EDUCATION/TRAINING PROGRAM

## 2017-06-29 PROCEDURE — 25000132 ZZH RX MED GY IP 250 OP 250 PS 637: Performed by: NURSE PRACTITIONER

## 2017-06-29 PROCEDURE — 99233 SBSQ HOSP IP/OBS HIGH 50: CPT | Mod: GC | Performed by: INTERNAL MEDICINE

## 2017-06-29 PROCEDURE — 83605 ASSAY OF LACTIC ACID: CPT | Performed by: PHYSICAL MEDICINE & REHABILITATION

## 2017-06-29 PROCEDURE — 21400006 ZZH R&B CCU INTERMEDIATE UMMC

## 2017-06-29 PROCEDURE — 40000133 ZZH STATISTIC OT WARD VISIT: Performed by: OCCUPATIONAL THERAPIST

## 2017-06-29 PROCEDURE — 97535 SELF CARE MNGMENT TRAINING: CPT | Mod: GO | Performed by: OCCUPATIONAL THERAPIST

## 2017-06-29 PROCEDURE — 25000132 ZZH RX MED GY IP 250 OP 250 PS 637

## 2017-06-29 PROCEDURE — 27210437 ZZH NUTRITION PRODUCT SEMIELEM INTERMED LITER

## 2017-06-29 PROCEDURE — 36592 COLLECT BLOOD FROM PICC: CPT | Performed by: STUDENT IN AN ORGANIZED HEALTH CARE EDUCATION/TRAINING PROGRAM

## 2017-06-29 PROCEDURE — 25000128 H RX IP 250 OP 636: Performed by: STUDENT IN AN ORGANIZED HEALTH CARE EDUCATION/TRAINING PROGRAM

## 2017-06-29 RX ORDER — FUROSEMIDE 10 MG/ML
20 INJECTION INTRAMUSCULAR; INTRAVENOUS ONCE
Status: COMPLETED | OUTPATIENT
Start: 2017-06-29 | End: 2017-06-29

## 2017-06-29 RX ORDER — ALBUMIN (HUMAN) 12.5 G/50ML
50 SOLUTION INTRAVENOUS ONCE
Status: COMPLETED | OUTPATIENT
Start: 2017-06-29 | End: 2017-06-29

## 2017-06-29 RX ADMIN — MIDODRINE HYDROCHLORIDE 10 MG: 5 TABLET ORAL at 17:24

## 2017-06-29 RX ADMIN — SULFAMETHOXAZOLE AND TRIMETHOPRIM 1 TABLET: 800; 160 TABLET ORAL at 19:57

## 2017-06-29 RX ADMIN — ALBUMIN (HUMAN) 50 G: 12.5 SOLUTION INTRAVENOUS at 17:23

## 2017-06-29 RX ADMIN — MIDODRINE HYDROCHLORIDE 10 MG: 5 TABLET ORAL at 11:53

## 2017-06-29 RX ADMIN — INSULIN ASPART 3 UNITS: 100 INJECTION, SOLUTION INTRAVENOUS; SUBCUTANEOUS at 11:54

## 2017-06-29 RX ADMIN — MIDODRINE HYDROCHLORIDE 10 MG: 5 TABLET ORAL at 07:42

## 2017-06-29 RX ADMIN — FUROSEMIDE 5 MG/HR: 10 INJECTION, SOLUTION INTRAVENOUS at 15:30

## 2017-06-29 RX ADMIN — GABAPENTIN 200 MG: 100 CAPSULE ORAL at 14:07

## 2017-06-29 RX ADMIN — MULTIPLE VITAMINS W/ MINERALS TAB 1 TABLET: TAB at 07:42

## 2017-06-29 RX ADMIN — ATENOLOL 50 MG: 50 TABLET ORAL at 07:42

## 2017-06-29 RX ADMIN — GABAPENTIN 200 MG: 100 CAPSULE ORAL at 19:57

## 2017-06-29 RX ADMIN — DIGOXIN 125 MCG: 125 TABLET ORAL at 07:42

## 2017-06-29 RX ADMIN — CALCIUM CARBONATE 600 MG (1,500 MG)-VITAMIN D3 400 UNIT TABLET 2 TABLET: at 07:42

## 2017-06-29 RX ADMIN — INSULIN ASPART 2 UNITS: 100 INJECTION, SOLUTION INTRAVENOUS; SUBCUTANEOUS at 17:28

## 2017-06-29 RX ADMIN — APIXABAN 2.5 MG: 2.5 TABLET, FILM COATED ORAL at 07:42

## 2017-06-29 RX ADMIN — Medication 2 G: at 14:07

## 2017-06-29 RX ADMIN — ACETAMINOPHEN 650 MG: 325 TABLET, FILM COATED ORAL at 21:27

## 2017-06-29 RX ADMIN — FUROSEMIDE 5 MG/HR: 10 INJECTION, SOLUTION INTRAVENOUS at 17:28

## 2017-06-29 RX ADMIN — ACETAMINOPHEN 650 MG: 325 TABLET, FILM COATED ORAL at 14:11

## 2017-06-29 RX ADMIN — PREDNISONE 10 MG: 10 TABLET ORAL at 07:42

## 2017-06-29 RX ADMIN — GABAPENTIN 200 MG: 100 CAPSULE ORAL at 07:42

## 2017-06-29 RX ADMIN — SODIUM CHLORIDE, PRESERVATIVE FREE 5 ML: 5 INJECTION INTRAVENOUS at 05:51

## 2017-06-29 RX ADMIN — SODIUM CHLORIDE, PRESERVATIVE FREE 5 ML: 5 INJECTION INTRAVENOUS at 14:07

## 2017-06-29 RX ADMIN — PANTOPRAZOLE SODIUM 40 MG: 40 TABLET, DELAYED RELEASE ORAL at 06:00

## 2017-06-29 RX ADMIN — Medication 30 MG: at 07:42

## 2017-06-29 RX ADMIN — SODIUM CHLORIDE, PRESERVATIVE FREE 5 ML: 5 INJECTION INTRAVENOUS at 08:51

## 2017-06-29 RX ADMIN — FUROSEMIDE 20 MG: 10 INJECTION, SOLUTION INTRAVENOUS at 15:29

## 2017-06-29 RX ADMIN — FOLIC ACID 1 MG: 1 TABLET ORAL at 07:42

## 2017-06-29 RX ADMIN — INSULIN ASPART 1 UNITS: 100 INJECTION, SOLUTION INTRAVENOUS; SUBCUTANEOUS at 07:43

## 2017-06-29 RX ADMIN — SULFAMETHOXAZOLE AND TRIMETHOPRIM 1 TABLET: 800; 160 TABLET ORAL at 07:42

## 2017-06-29 RX ADMIN — APIXABAN 2.5 MG: 2.5 TABLET, FILM COATED ORAL at 19:57

## 2017-06-29 ASSESSMENT — PAIN DESCRIPTION - DESCRIPTORS: DESCRIPTORS: SORE;NUMBNESS

## 2017-06-29 NOTE — PLAN OF CARE
Problem: Goal Outcome Summary  Goal: Goal Outcome Summary  Outcome: Improving     D: Hx VSD repair (1969), RA, afib/flutter/SVT. Recently admitted 5/29-6/15  after an OOH cardiac arrest. Returned to hospital 6/19 with LUE wound pain and fever.   I/A: BPs soft but stable (80s/60s), on 2L O2 overnight. Monitor shows aflutter. Pt reports L hand pain controlled with prn tylenol x1. TF running at 40 ml/hr through NG. Huynh with adequate UO. Rectal tube patent. Turned/repositioned q2h and mary care prn. Lymph wraps on BLE. LifeVest in place, backup battery at bedside. BG stable overnight, no SSI given. Calorie counts in place. Sleeping between cares.  P: Pt weighed in bed, recommend re-weighing patient once bed can be zeroed. Pt triggered sepsis protocol this am, awaiting LA level. Continue to monitor and notify Cards 1 with pertinent changes.

## 2017-06-29 NOTE — PLAN OF CARE
Problem: Goal Outcome Summary  Goal: Goal Outcome Summary  PT 6C: patient transferred bed <> moveo via lift and assist x 2.  Performed partial weight bearing and squats with moveo inclined 20-30 degrees.  Patient motivated to participate in PT.      Recommend discharge to TCU/ARU pending continued participation and medical course.

## 2017-06-29 NOTE — PLAN OF CARE
Problem: Goal Outcome Summary  Goal: Goal Outcome Summary  OT/6C: Facilitated graded ADLs and noam UE exercises to promote ADL I. Pt requiring min A to complete. Pt limited by SOB, strength, and activity tolerance. SpO2 in high 90s on 2L O2 NC, HR 120s jumping to 140s with exercise, going to 120s in <20 seconds.     REC: TCU due to decreased ADL I, mobility, strength; at this time pt with limited activity tolerance, anticipate unable to participate for ARU

## 2017-06-29 NOTE — PROGRESS NOTES
"SPIRITUAL HEALTH SERVICES  SPIRITUAL ASSESSMENT Progress Note  [facility] ([place]) 6C     PRIMARY FOCUS:     Goals of care    Emotional/spiritual/Episcopal distress    Support for coping    REFERRAL SOURCE: Follow up    ILLNESS CIRCUMSTANCES:   Reviewed documentation. Reflective conversation shared with Amelia and her  which integrated elements of illness and family narratives.     Context of Serious Illness/Symptom(s) - Patient and  shared update on patient's illness - still waiting for fluid to go down. Patient confirmed that she has no issues with her heart, some trouble with blood pressure, but main problem is fluid.    Resources for Support -     DISTRESS:     Emotional/Spiritual/Existential Distress - Patient expressed that she was \"done\" and I clarified that she meant she was done with things moving slowly. Patient expressed relief at this, and agreed that she's done with waiting and wants more aggressive intervention from her doctor. \"I know this process is slow, but we're not doing anything. I'm okay with it being slow, but not this slow.\"    Sabianist Distress - Not expressed    Social/Cultural/Economic Distress - Patient joked \"can you get me a spa treatment? I haven't shaved my legs in weeks\"    SPIRITUAL/Yazidism COPING:     Gnosticist/Jennifer - Patient confirmed that she is Buddhist, but not practicing. Expressed that she's happy with getting communion sometimes.     Spiritual Practice(s) - Not expressed. We shared conversation about the things that she is enjoying in the hospital (cheerios, peanut butter on white bread) and the victories she has had (healed wound on her chest, heart is functioning well). Patient's  requested prayer before I left, and patient welcomed it.    Emotional/Relational/Existential Connections - Patient has support of her  in navigating illness. Patient expressed that  visits are helpful to her.    GOALS OF CARE:    Goals of Care - " Patient expressed that the goal is to get the fluid down, to get back to rehab and then normal life.    Meaning/Sense-Making - Patient expressed not understanding why she's had so many setbacks.    PLAN: Will follow up 1-2x/week as patient remains on unit. I let patient and  know that I'm out of the hospital tomorrow, but they can have the nurse page a  at any time.    Caitie Colón   Intern  Pager [phone]

## 2017-06-29 NOTE — PLAN OF CARE
Problem: Goal Outcome Summary  Goal: Goal Outcome Summary  Edema 6C: Wraps removed and skin cares performed. 2+ pitting in proximal LEs and 1+ in feet/ankles. Minor redness noted on anterior ankles - foam padding placed in wraps to reduce pressure. Reapplication of GCB from MTPs to knee creases on R LE and MTPs to mid thigh on L LE (alternating full leg wraps to allow for increased ease in mobility). Remove wraps if causing pain/numbness or become soiled.

## 2017-06-29 NOTE — PROGRESS NOTES
Cardiology Daily Note   Date of Service: 6/29/2017  Patient: Amelia Michel  MRN: [id number]  Admission Date: 6/19/2017  Hospital Day # 10    Assessment & Plan:   Amelia Michel is a 56 year old female with PMHx of VSD s/p repari (1969), RA, recently diagnosed Atrial fibrillation that was complicated with an OOH cardiac arrest (felt 2/2 long pause with degeneration to VFib while converting Afib to SR with multiple AV prieto agents) who was readmitted from ARU on 6/19 with fever and concern for worsening LUE infection. She is now transferred back to cardiology service as primary on 6/26 for further workup and evaluation of recurrent lactic acidosis, persistent afib and softer blood pressures with tachypnea.         Changes Today:  - Hemoglobin stable  - Apixaban resumed  - Will trial lasix 20mg IV x1 followed by gtt for 8 hours with concurrent albumin infusion  - Monitor I/Os  - Unable to pursue UF as patient would need heparin for this and her platelets are only 27  - Continue bactrim DS through 7/2/17     # Volume overload/anasarca:  # HFrEF (EF 50-55%):  # Hypotension:  Patient is up approximately 35lbs from 6/13/17 (157lbs -> 190lbs) over the coarse of her complicated hospitalization for OOH arrest, subsequent visit at the ARU and now readmission for evaluation of possible LUE infection and sepsis. She is severely malnourished and has an albumin of 1.9 which would suggest third spacing of the majority of her fluid making her a challenging candidate for diuresis. Over the past 24 -48 hours she has had softer blood pressures with systolics 80-90s. She is total body volume up and intravascularly deplete.  - Right heart cath 6/26 with mildly elevated right and left sided filling presures  - Diuretics: Lasix 20mg IV x1 followed by gtt at 5mg/hr 6/28-6/29  - Cardiology heart failure consult, appreciate recs    # Atrial fibrillation/flutter with RVR:  RVR in setting of volume overload above.  - Continue dig  125mcg  - Continue atenolol 50mg daily  - Management of volume overload as above  - Hold apixaban 6/26-6/27, will resume as appropriate    # Out of hospital arrest:  - Pt will need to wear LifeVest until medically appropriate and cleared from infection/wound standpoint for ICD implantation likely 4-6 weeks after discharge per EP (pending hospital course for LUE wound)    # LUE wound and soft tissue infection:  Most consistent with cellulitis. Wound culture on 6/19 grew Enterobacter cloacae complex, Enterococcus faecalis and coagulase negative staphylococcus.  - ID following appreciate recs   - Rec to DC ertapenem and start bactrim 1DS q12h for LUE wound (will defer until assessment of SVR with RHC given concern lactic acidosis may be related to sepsis of other unclear source)  - 6/19 L Elbow XR:  Negative for osteo, diffuse subcutaneous soft tissue stranding, possibly representative of cellulitis   - 6/19 L Elbow MRI: Diffuse cellulitis and multicompartment soft tissue tissue edema within the muscles about the left elbow, with a focal area of nonenhancement,  centered in the brachialis muscle. Differential includes myositis,including infectious or inflammatory etiology. A focal area of myonecrosis is likely within the brachialis muscle. Correlate clinically.  --General Surgery: 06/19: no surgical indication.   --Pain control: Tramadol 25mg q6h prn, Continue morphine gel around edges of wound for pain   - WOCN    # Lactic acidosis: Resolved  # SIRS (Tachycardia, leukopenia):  Patient with recurrent lactic acidosis, last 2.8 -> 2.3 (mildly elevated). DDx sepsis, cardiogenic shock (patient work, EF only mildly reduced), no e/o limb ischemia, DKA, or seizure. She has been afebrile, her procal is elevated but improving. Her CXR does have basilar opacities with question for possible PNA which may be a potential source. Blood cultures with no growth.  - Pan culture 6/26   - BCx (6/26): In process   - UA/UCx:   - Enteric  panel negative  - Wound culture (left upper extremity, 6/19): Heavy growth Enterobacter cloacae complex, Moderate growth Enterococcus faecalis, Moderate growth Coagulase negative Staphylococcus       # Acute on Chronic Thrombocytopenia:   2/2 hyporoliferative bone marrow. Plts noted to decrease from 104 to as low as 15 during recent hospitalization requiring transfusion w/ chronically low in the 's as outpatient.  -- If this is chronic ITP, no need for treatment unless plts drift < 30s Transfused 1u plts 6/26 for plts 25  -- Continue to hold RA meds for now  -- Apixaban 2.5 mg BID (held 6/26-6/27, resumed 6/28). This will need to be held 2 days prior to ICD placement after discharge (this will be deferred given acute infection).    # Acute on chronic anemia: Stable  Hemoglobin drop from 8.8 (6/26) --> 6.9. No evidence of overt bleeding (stool light brown). T Bili normal.  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 Now on 10 mg daily, taper to 7.5 mg on 6/29, then to 5 mg daily after 2 weeks if continues to have low disease activity  -- Continue to hold HCQ and MTX until outpatient follow-up  -- Follow-up with [facility] Dr. Conor Cunha in 4-6 weeks after discharge      # Severe Critical Illness Myopathy:   Significant deconditioning w/ median neuropathies most likely localized to the wrists and ulnar neuropathies most likely localized to the elbows secondary to RA.  -- Ongoing extensive PT/OT/SLP      # Malnutrition:   -- Nutrition recommendations are to continue Peptamen 1.5 Tube feeds to 40 ml/hr x 11 hours which will provide 440 ml TF, 660 kcals, and 30 g protein daily. TFs may be adjusted pending oral intake. Once kcal counts reveal that  "patient is consuming at least 900 kcals and 45 g protein daily on average can discontinue.  -- Water flushes as needed.  -- Nutrition on board.     Consulting Services: Nutrition, ID, heart failure service    CODE: Full Code  DVT Ppx: Apixaban resumed 6/28  Diet/Fluids: As above.  Disposition: Pending improvement in above.    Pt's care was discussed with bedside RN and patient during Care Team Rounds.    Patient was discussed and evaluated with Dr. Trent.    Evie Longo MD PGY2  Internal Medicine Resident  Pager: [phone]    ___________________________________________________________________    Subjective & Interval History:     Last 24 hr care team notes reviewed. No acute events overnight. Continues to have very gradual improvement daily. She does still have some shortness of breath but predominantly bothered by diffuse body swelling and difficulty taking oral from distended/swollen abdomen. , Wolf, at bedside updated with cares.  Medications: Reviewed in EPIC. List below for reference    Physical Exam:    Blood pressure 108/75, pulse 119, temperature 97.4  F (36.3  C), temperature source Oral, resp. rate 22, height 1.48 m (4' 10.25\"), weight 87.8 kg (193 lb 8 oz), SpO2 96 %, not currently breastfeeding.    CONSTITUTIONAL: Ms. Michel is sitting up in a chair in no apparent distress.  HEENT:  NC/AT.  OP Clear.  MMM. PERRLA.  EOMI.   LUNGS: Mildly tachypneic, no increased work of breathing. Lungs clear in anterior fields. No wheezes appreciated.  CARDIOVASCULAR: Irregularly irregular w/ no M/R/G.   ABDOMEN: Soft, distended (edematous abdominal wall), NT, BS +ve.   MUSCULOSKELETAL:  Moves all four extremities without difficulty.  DP pulses intact.  Diffuse anasarca.  NEURO: A&Ox3, CN II-XII grossly intact, non-focal.   PSYCHIATRIC:  Normal affect.  SKIN: Warm, Dry, No rashes.     Lines/Tubes: Rectal tube, brand catheter  PICC Triple Lumen 06/26/17 Right Basilic ok to use. (Active)   Site Assessment WDL " 6/26/2017  1:29 PM   External Cath Length (cm) 3 cm 6/26/2017  1:29 PM   Extremity Circumference (cm) 35 cm 6/26/2017  1:29 PM   Dressing Intervention Chlorhexidine patch;Transparent;Securing device;New dressing 6/26/2017  1:29 PM   Dressing Change Due 07/03/17 6/26/2017  1:29 PM   PICC Lumen Assessment Trigger Red;White;Gray 6/26/2017  1:29 PM   Number of days:0       Labs & Studies of Note: Reviewed in Epic  CMP    Recent Labs  Lab 06/29/17  0554 06/28/17  1754 06/28/17  0555 06/27/17  0826 06/26/17  0647 06/25/17  0710 06/24/17  2221 06/24/17  0623    135 138 138 134 133 132*  --    POTASSIUM 4.1 4.1 4.2 4.7 4.8 4.6 4.9 4.9   CHLORIDE 108 108 109 110* 106 106 106  --    CO2 22 20 23 21 17* 18* 18*  --    ANIONGAP 6 6 6 7 10 10 8  --    * 197* 165* 184* 166* 143* 151*  --    BUN 45* 38* 43* 39* 42* 47* 47*  --    CR 0.72 0.68 0.65 0.65 0.72 0.85 0.85  --    GFRESTIMATED 84 90 >90Non  GFR Calc >90Non  GFR Calc 84 69 69  --    GFRESTBLACK >90African American GFR Calc >90African American GFR Calc >90African American GFR Calc >90African American GFR Calc >90African American GFR Calc 83 84  --    VIKTORIYA 7.2* 7.2* 7.4* 7.3* 7.5* 7.3* 7.1*  --    MAG  --   --   --   --  2.1 2.4* 1.9 1.9   PHOS  --   --   --   --   --   --  3.4  --    PROTTOTAL  --   --  3.9* 4.2*  --   --   --   --    ALBUMIN  --   --  1.5* 1.4*  --   --   --   --    BILITOTAL  --   --  0.8 0.6  --   --   --   --    ALKPHOS  --   --  126 129  --   --   --   --    AST  --   --  41 54*  --   --   --   --    ALT  --   --  50 60*  --   --   --   --        CBC    Recent Labs  Lab 06/29/17  0554 06/28/17  1609 06/28/17  0555 06/27/17  0826   WBC 3.0* 3.0* 2.4* 2.8*   RBC 2.89* 2.89* 2.11* 2.38*   HGB 8.9* 9.2* 6.9* 7.7*   HCT 26.3* 26.7* 20.0* 22.3*   PLT 27* 27* 27* 38*       INR    Recent Labs  Lab 06/28/17  1609   INR 1.31*       Unresulted Labs Ordered in the Past 30 Days of this Admission     Date and Time Order  Name Status Description    6/28/2017 1446 Blood Morphology Pathologist Review In process     6/26/2017 0935 Blood culture Preliminary     6/26/2017 0935 Blood culture Preliminary     5/29/2017 1046 Adalimumab Activity and Neutralizing Antibodies: Laboratory Miscellaneous Order In process           Medication List for Reference (delete if desired)  Current Facility-Administered Medications   Medication     furosemide (LASIX) injection 20 mg     furosemide (LASIX) 100 mg in NaCl 0.9 % 100 mL infusion     albumin human 25 % injection 50 g     apixaban ANTICOAGULANT (ELIQUIS) tablet 2.5 mg     midodrine (PROAMATINE) tablet 10 mg     sulfamethoxazole-trimethoprim (BACTRIM DS/SEPTRA DS) 800-160 MG per tablet 1 tablet     lidocaine (LMX4) kit     calcium carbonate (TUMS) chewable tablet 500 mg     pantoprazole (PROTONIX) EC tablet 40 mg     digoxin (LANOXIN) tablet 125 mcg     ondansetron (ZOFRAN) tablet 4 mg     prochlorperazine (COMPAZINE) injection 5-10 mg     ondansetron (ZOFRAN) injection 4 mg     simethicone (MYLICON) chewable tablet 80 mg     sodium chloride (PF) 0.9% PF flush 10-20 mL     sodium chloride (PF) 0.9% PF flush 10 mL     heparin lock flush 10 UNIT/ML injection 5-10 mL     heparin lock flush 10 UNIT/ML injection 5-10 mL     dextrose 10 % 1,000 mL infusion     atenolol (TENORMIN) tablet 50 mg     folic acid (FOLVITE) tablet 1 mg     gabapentin (NEURONTIN) capsule 200 mg     melatonin tablet 3 mg     morphine 0.1% in solosite topical gel 2 g     multivitamin, therapeutic with minerals (THERA-VIT-M) tablet 1 tablet     triamcinolone (KENALOG) 0.1 % ointment     lidocaine 1 % 1 mL     lidocaine (LMX4) kit     sodium chloride (PF) 0.9% PF flush 3 mL     sodium chloride (PF) 0.9% PF flush 3 mL     medication instruction     acetaminophen (TYLENOL) tablet 650 mg     acetaminophen (TYLENOL) Suppository 650 mg     Patient is already receiving anticoagulation with heparin, enoxaparin (LOVENOX), warfarin (COUMADIN)   or other anticoagulant medication     glucose 40 % gel 15-30 g    Or     dextrose 50 % injection 25-50 mL    Or     glucagon injection 1 mg     insulin aspart (NovoLOG) inj (RAPID ACTING)     insulin aspart (NovoLOG) inj (RAPID ACTING)     naloxone (NARCAN) injection 0.1-0.4 mg     calcium-vitamin D (CALTRATE) 600-400 MG-UNIT per tablet 2 tablet     co-enzyme Q-10 capsule 30 mg     morphine 0.1% in solosite topical gel     potassium chloride SA (K-DUR/KLOR-CON M) CR tablet 20-40 mEq     potassium chloride (KLOR-CON) Packet 20-40 mEq     potassium chloride 10 mEq in 100 mL intermittent infusion     potassium chloride 10 mEq in 100 mL intermittent infusion with 10 mg lidocaine     potassium chloride 20 mEq in 50 mL intermittent infusion     magnesium sulfate 2 g in NS intermittent infusion (PharMEDium or FV Cmpd)     magnesium sulfate 4 g in 100 mL sterile water (premade)     sodium phosphate 10 mmol in D5W intermittent infusion     sodium phosphate 15 mmol in D5W intermittent infusion     sodium phosphate 20 mmol in D5W intermittent infusion     sodium phosphate 25 mmol in D5W intermittent infusion     melatonin tablet 1 mg     traMADol (ULTRAM) half-tab 25 mg     predniSONE (DELTASONE) tablet 10 mg       I very much appreciated the opportunity to see and assess Mrs Amelia Michel in the hospital with CV Fellow Dr Og and resident Dr Longo. Today she appeared improved prob in part after transfusion. Diuresis is still limited. Will discuss possibility of dialysis approach This was discussed with spouse present and questions were addressed.    I agree with the note above which summarizes my findings and current recommendations.  Please do not hesitate to contact my office if you have any questions or concerns.      Pj Trent MD  Cardiac Arrhythmia Service  [facility]  [phone]

## 2017-06-29 NOTE — PLAN OF CARE
Problem: Goal Outcome Summary  Goal: Goal Outcome Summary  Outcome: No Change  VSS, Aflutter 90s-120s.  BPs trending up from 80s/60s to 100s/60s.  Tylenol x1 given for LUE dressing change.  Lifevest in place with backup battery at bedside.  Huynh and rectal tube in place.  Huynh care complete.  20 mg IV lasix given, with lasix gtt at 5 ml/hr for 8 hours.  Albumin given as well.  Continuous TF at 40 ml/hr.  Blood sugars assessed QID and managed w/SSI.  Lymph wraps on BLE rewrapped today.  Pt up w/2 and lift to chair x1.  Pt highly motivated to improve and get better, and pt and  verbalized frustration in waiting for plan of care today.  Will continue to monitor and notify Cards 1 with any concerns or questions.

## 2017-06-29 NOTE — PROGRESS NOTES
Care Coordinator Progress Note     Admission Date/Time:  6/19/2017  Attending MD:  No att. providers found     Data  Chart reviewed, discussed with interdisciplinary team.   Patient was admitted for fever and concern for worsening LUE infection.    Assessment  Per RN, pt states that life vest is not fitting properly.   OT currently recommending discharge to TCU.  Pt currently receiving TF and cardiac telemetry monitoring inpatient. Pt also currently has brand and rectal tube in place.  Intervention  This writer spoke with Chelo Valente, [employer]est  (P: [phone]) who stated that she will arrange to have a tech come out and refit the life vest. Chelo stated she will call this writer with time when arranged.   Plan  Anticipated Discharge Date: TBD  Anticipated Discharge Plan: TCU  CC will continue to monitor patient's medical condition and progress towards discharge.  Rose Mayes RN BSN  6C Unit Care Coordinator  Phone number: [phone]  Pager: [phone]    Addendum  This writer received call back from Chelo Valente, that [employer] tech will be here between 1:30 and 2 to refit life vest.

## 2017-06-29 NOTE — PROGRESS NOTES
[facility] ID SERVICE: ONGOING CONSULTATION   Amelia Michel : 1960 Sex: female:   Medical record number [id number] Attending Physician: No att. providers found  Date of Service: 2017    PROBLEM LIST:   1. Left Upper extremity wound and soft tissue infection- cellulitis  2. Rheumatoid Arthritis  3. Afib/flutter  4. Recent cardiac arrest  5. H/O ESBL organisms    RECOMMENDATIONS:   1. Continue Bactrim 1 double strength tablet q12h for ongoing treatment of LUE wound; recommend treating for a few more days (through 2017 for a 14 day course) and then stopping and observing  2. Continue wound cares; appreciate [facility] input    DISCUSSION:    Amelia Michel is a 56 year old woman with hx of VSD repair (), Rheumatoid arthritis, and a recent diagnosis of Afib/Aflutter/SVT, who presented to [facility] on  after Out of Hospital cardiac arrest.  After ROSC in the field, she was admitted from -6/15, extubated . While at ARU (6/15-), pt developed worsening LUE wound pain and fever, and was admitted back to [facility] on 17 for further workup.       The patient first developed the LUE wound in her previous admission on . It is unclear as to what incited the wound but is thought to be secondary to IV extravasation. On chart review, it seems that she initially developed blisters and sloughed off epidermis around multiple old IV site bruises and edema up to the shoulder. 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 No DVTs. Vascular surgery consult at that time showed no indication for surgical intervention.  She received Vancomycin (-), meropenem (-6/3) and ertapenem(6/3-) during that hospitalization for Aspiration PNA and ESBL UTI.      Following her discharge, the patient experienced worsening of the wound pain and had fevers which prompted admission. Wound  "culture on 6/19/17 grew Enterobacter cloacae complex, Enterococcus faecalis and coagulase negative staphylococcus. On repeat examination, the wound does show signs of inflammation c/w cellulitis though unclear if it is chemical or infectious. The wound has been receding as per the patient since this admission, suggesting that either more effective wound cares or antibiotics or both may be improving the wound. In agreement with AMT, Bactrim is reasonable to target the Enterobacter in the wound.    ID will sign off. Call with any further questions.    MEME Chow M.D.  [facility] ID Service Staff  [phone]    SUBJECTIVE: (Recommend ? 4 systems)   Feeling ok. Breathless this AM. Wound is about the same in terms of pain. Mobility has been maintained; working with PT/OT. WOC pictures from 6/28/ reviewed.  ROS:(Recommend ? 2systems)   A five-point review of systems was obtained and was negative with the exception of that which is described above.  Allergies   Allergen Reactions     Penicillins      Tape [Adhesive Tape] Rash       No current outpatient prescriptions on file.       CURRENT ANTI-INFECTIVES:   Bactrim  EXAMINATION: (Recommend ? 5 systems)   Vital Signs: BP (!) 84/74 (BP Location: Right arm)  Pulse 119  Temp 97.5  F (36.4  C) (Oral)  Resp 20  Ht 1.48 m (4' 10.25\")  Wt 85.7 kg (189 lb)  SpO2 99%  BMI 39.16 kg/m2   Constitutional: awake, alert, cooperative, no apparent distress and appears at stated age  Head, ENT, Eyes, and Neck: Normocephalic, sinuses non-tender to palpation, external ears without lesions, moist buccal mucosa; non-icteric sclera. Neck supple without rigidity, no cervical/axillary/inguinal LA bilaterally.   Lungs: CTA bilaterally  CVS : irregularly irregular rhythm, variable S1/S2, no murmur, PMI was not displaced.   Abdomen: non-tender, non-distended, no masses, no bruit, no shifting dullness, normal BS.   Extremities : LUE wound with overlying eschar and fibrinous material, with " surrounding edema; erythema gone, no active drainage, wrist tenderness, +1 pitting edema of bilateral lower extremities, normal ROM of all joints      NEW DATA/RESULTS:   Culture Micro   Date Value Ref Range Status   06/26/2017 No growth  Final   06/26/2017 No growth after 3 days  Final   06/26/2017 No growth after 3 days  Final   06/19/2017   Final    50,000 to 100,000 colonies/mL mixed urogenital harshal Susceptibility testing not   routinely done     06/19/2017 (A)  Final    Heavy growth Enterobacter cloacae complex  Moderate growth Enterococcus faecalis  Moderate growth Coagulase negative Staphylococcus Susceptibility testing not   routinely done     06/19/2017 No anaerobes isolated  Final       Recent Labs   Lab Test  06/20/17   0709  06/19/17   1612  06/04/17   0408  06/03/17   0307  06/02/17   0342  06/01/17   0342   CRP  110.0*  98.0*  23.0*  29.0*  55.0*  98.0*     Recent Labs   Lab Test  06/29/17   0554  06/28/17   1609  06/28/17   0555  06/27/17   0826  06/26/17   0647  06/25/17   0710   WBC  3.0*  3.0*  2.4*  2.8*  3.2*  3.3*     Recent Labs   Lab Test  06/29/17   0554  06/28/17   1754  06/28/17   0555  06/27/17   0826   CR  0.72  0.68  0.65  0.65   GFRESTIMATED  84  90  >90  Non  GFR Calc    >90  Non  GFR Calc         Hematology Studies  Recent Labs   Lab Test  06/29/17   0554  06/28/17   1609  06/28/17   0555  06/27/17   0826  06/26/17   0647  06/25/17   0710   06/22/17   0653  06/21/17   0623  06/20/17   0709   06/16/17   0609   06/15/17   0556   WBC  3.0*  3.0*  2.4*  2.8*  3.2*  3.3*   < >  2.5*  2.8*  2.8*   < >  2.3*   < >  2.3*   ANEU   --   2.1   --    --    --    --    --   1.7  1.9  1.7   --   1.4*   --   1.6   AEOS   --   0.0   --    --    --    --    --   0.1  0.1  0.2   --   0.2   --   0.1   HCT  26.3*  26.7*  20.0*  22.3*  26.3*  23.9*   < >  23.7*  28.3*  25.7*   < >  27.9*   < >  29.8*   PLT  27*  27*  27*  38*  25*  30*   < >  33*  43*  50*   < >  51*   < >   52*    < > = values in this interval not displayed.       Metabolic  Recent Labs   Lab Test  06/29/17   0554  06/28/17   1754  06/28/17   0555   NA  136  135  138   BUN  45*  38*  43*   CO2  22  20  23   CR  0.72  0.68  0.65   GFRESTIMATED  84  90  >90  Non  GFR Calc         Hepatic Studies  Recent Labs   Lab Test  06/28/17   0555  06/27/17   0826  06/19/17   0608   BILITOTAL  0.8  0.6  0.7   ALKPHOS  126  129  180*   ALBUMIN  1.5*  1.4*  1.8*   AST  41  54*  41   ALT  50  60*  63*       Immunologlobulins  Recent Labs   Lab Test  06/19/17   1612  05/30/17   0347  05/29/17   1055   SED  63*  34*  22

## 2017-06-29 NOTE — PROGRESS NOTES
Calorie Counts  Intake recorded for: 6/28  Kcals: 524  Protein: 32g  # Meals Recorded: 100% broth, cantaloupe, cottage cheese, 8 oz 1% milk, 75% 8 oz orange juice, 50% oatmeal w/ brown sugar, grapes   # Supplements Recorded: 100% 1 packet BeneProtein

## 2017-06-30 ENCOUNTER — APPOINTMENT (OUTPATIENT)
Dept: OCCUPATIONAL THERAPY | Facility: CLINIC | Age: 57
DRG: 871 | End: 2017-06-30
Attending: INTERNAL MEDICINE
Payer: COMMERCIAL

## 2017-06-30 ENCOUNTER — APPOINTMENT (OUTPATIENT)
Dept: SPEECH THERAPY | Facility: CLINIC | Age: 57
DRG: 871 | End: 2017-06-30
Attending: INTERNAL MEDICINE
Payer: COMMERCIAL

## 2017-06-30 ENCOUNTER — APPOINTMENT (OUTPATIENT)
Dept: PHYSICAL THERAPY | Facility: CLINIC | Age: 57
DRG: 871 | End: 2017-06-30
Attending: INTERNAL MEDICINE
Payer: COMMERCIAL

## 2017-06-30 LAB
ABO + RH BLD: NORMAL
ABO + RH BLD: NORMAL
ANION GAP SERPL CALCULATED.3IONS-SCNC: 7 MMOL/L (ref 3–14)
ANION GAP SERPL CALCULATED.3IONS-SCNC: 8 MMOL/L (ref 3–14)
BLD GP AB SCN SERPL QL: NORMAL
BLD PROD TYP BPU: NORMAL
BLD PROD TYP BPU: NORMAL
BLD UNIT ID BPU: 0
BLOOD BANK CMNT PATIENT-IMP: NORMAL
BLOOD PRODUCT CODE: NORMAL
BPU ID: NORMAL
BUN SERPL-MCNC: 39 MG/DL (ref 7–30)
BUN SERPL-MCNC: 43 MG/DL (ref 7–30)
CALCIUM SERPL-MCNC: 7.4 MG/DL (ref 8.5–10.1)
CALCIUM SERPL-MCNC: 7.4 MG/DL (ref 8.5–10.1)
CHLORIDE SERPL-SCNC: 104 MMOL/L (ref 94–109)
CHLORIDE SERPL-SCNC: 107 MMOL/L (ref 94–109)
CO2 SERPL-SCNC: 23 MMOL/L (ref 20–32)
CO2 SERPL-SCNC: 27 MMOL/L (ref 20–32)
CREAT SERPL-MCNC: 0.7 MG/DL (ref 0.52–1.04)
CREAT SERPL-MCNC: 0.75 MG/DL (ref 0.52–1.04)
ERYTHROCYTE [DISTWIDTH] IN BLOOD BY AUTOMATED COUNT: 21.7 % (ref 10–15)
GFR SERPL CREATININE-BSD FRML MDRD: 80 ML/MIN/1.7M2
GFR SERPL CREATININE-BSD FRML MDRD: 87 ML/MIN/1.7M2
GLUCOSE BLDC GLUCOMTR-MCNC: 147 MG/DL (ref 70–99)
GLUCOSE BLDC GLUCOMTR-MCNC: 162 MG/DL (ref 70–99)
GLUCOSE BLDC GLUCOMTR-MCNC: 186 MG/DL (ref 70–99)
GLUCOSE BLDC GLUCOMTR-MCNC: 224 MG/DL (ref 70–99)
GLUCOSE BLDC GLUCOMTR-MCNC: 231 MG/DL (ref 70–99)
GLUCOSE BLDC GLUCOMTR-MCNC: 268 MG/DL (ref 70–99)
GLUCOSE SERPL-MCNC: 143 MG/DL (ref 70–99)
GLUCOSE SERPL-MCNC: 171 MG/DL (ref 70–99)
HCT VFR BLD AUTO: 21.9 % (ref 35–47)
HGB BLD-MCNC: 7.6 G/DL (ref 11.7–15.7)
HGB BLD-MCNC: 8.6 G/DL (ref 11.7–15.7)
LACTATE BLD-SCNC: 1.5 MMOL/L (ref 0.7–2.1)
MCH RBC QN AUTO: 31.9 PG (ref 26.5–33)
MCHC RBC AUTO-ENTMCNC: 34.7 G/DL (ref 31.5–36.5)
MCV RBC AUTO: 92 FL (ref 78–100)
NUM BPU REQUESTED: 2
PLATELET # BLD AUTO: 26 10E9/L (ref 150–450)
POTASSIUM SERPL-SCNC: 3.1 MMOL/L (ref 3.4–5.3)
POTASSIUM SERPL-SCNC: 3.6 MMOL/L (ref 3.4–5.3)
RBC # BLD AUTO: 2.38 10E12/L (ref 3.8–5.2)
SODIUM SERPL-SCNC: 137 MMOL/L (ref 133–144)
SODIUM SERPL-SCNC: 137 MMOL/L (ref 133–144)
SPECIMEN EXP DATE BLD: NORMAL
TRANSFUSION STATUS PATIENT QL: NORMAL
TRANSFUSION STATUS PATIENT QL: NORMAL
WBC # BLD AUTO: 2.3 10E9/L (ref 4–11)

## 2017-06-30 PROCEDURE — 36592 COLLECT BLOOD FROM PICC: CPT | Performed by: PHYSICAL MEDICINE & REHABILITATION

## 2017-06-30 PROCEDURE — 25000125 ZZHC RX 250: Performed by: INTERNAL MEDICINE

## 2017-06-30 PROCEDURE — 40000133 ZZH STATISTIC OT WARD VISIT

## 2017-06-30 PROCEDURE — 97530 THERAPEUTIC ACTIVITIES: CPT | Mod: GP | Performed by: PHYSICAL THERAPIST

## 2017-06-30 PROCEDURE — 40000225 ZZH STATISTIC SLP WARD VISIT: Performed by: SPEECH-LANGUAGE PATHOLOGIST

## 2017-06-30 PROCEDURE — 25000132 ZZH RX MED GY IP 250 OP 250 PS 637: Performed by: STUDENT IN AN ORGANIZED HEALTH CARE EDUCATION/TRAINING PROGRAM

## 2017-06-30 PROCEDURE — 27210437 ZZH NUTRITION PRODUCT SEMIELEM INTERMED LITER

## 2017-06-30 PROCEDURE — 80048 BASIC METABOLIC PNL TOTAL CA: CPT | Performed by: PHYSICAL MEDICINE & REHABILITATION

## 2017-06-30 PROCEDURE — 85018 HEMOGLOBIN: CPT | Performed by: PHYSICAL MEDICINE & REHABILITATION

## 2017-06-30 PROCEDURE — 97110 THERAPEUTIC EXERCISES: CPT | Mod: GP | Performed by: PHYSICAL THERAPIST

## 2017-06-30 PROCEDURE — 21400006 ZZH R&B CCU INTERMEDIATE UMMC

## 2017-06-30 PROCEDURE — 00000146 ZZHCL STATISTIC GLUCOSE BY METER IP

## 2017-06-30 PROCEDURE — 25000132 ZZH RX MED GY IP 250 OP 250 PS 637: Performed by: INTERNAL MEDICINE

## 2017-06-30 PROCEDURE — P9047 ALBUMIN (HUMAN), 25%, 50ML: HCPCS | Performed by: STUDENT IN AN ORGANIZED HEALTH CARE EDUCATION/TRAINING PROGRAM

## 2017-06-30 PROCEDURE — 92507 TX SP LANG VOICE COMM INDIV: CPT | Mod: GN | Performed by: SPEECH-LANGUAGE PATHOLOGIST

## 2017-06-30 PROCEDURE — 25000128 H RX IP 250 OP 636: Performed by: STUDENT IN AN ORGANIZED HEALTH CARE EDUCATION/TRAINING PROGRAM

## 2017-06-30 PROCEDURE — 83605 ASSAY OF LACTIC ACID: CPT | Performed by: PHYSICAL MEDICINE & REHABILITATION

## 2017-06-30 PROCEDURE — 85027 COMPLETE CBC AUTOMATED: CPT | Performed by: STUDENT IN AN ORGANIZED HEALTH CARE EDUCATION/TRAINING PROGRAM

## 2017-06-30 PROCEDURE — 25000132 ZZH RX MED GY IP 250 OP 250 PS 637

## 2017-06-30 PROCEDURE — 87040 BLOOD CULTURE FOR BACTERIA: CPT | Performed by: STUDENT IN AN ORGANIZED HEALTH CARE EDUCATION/TRAINING PROGRAM

## 2017-06-30 PROCEDURE — 40000193 ZZH STATISTIC PT WARD VISIT: Performed by: PHYSICAL THERAPIST

## 2017-06-30 PROCEDURE — 97535 SELF CARE MNGMENT TRAINING: CPT | Mod: GO

## 2017-06-30 PROCEDURE — 25000132 ZZH RX MED GY IP 250 OP 250 PS 637: Performed by: NURSE PRACTITIONER

## 2017-06-30 PROCEDURE — P9016 RBC LEUKOCYTES REDUCED: HCPCS | Performed by: INTERNAL MEDICINE

## 2017-06-30 PROCEDURE — 80048 BASIC METABOLIC PNL TOTAL CA: CPT | Performed by: STUDENT IN AN ORGANIZED HEALTH CARE EDUCATION/TRAINING PROGRAM

## 2017-06-30 RX ORDER — ALBUMIN (HUMAN) 12.5 G/50ML
50 SOLUTION INTRAVENOUS ONCE
Status: COMPLETED | OUTPATIENT
Start: 2017-06-30 | End: 2017-06-30

## 2017-06-30 RX ORDER — FUROSEMIDE 10 MG/ML
20 INJECTION INTRAMUSCULAR; INTRAVENOUS ONCE
Status: COMPLETED | OUTPATIENT
Start: 2017-06-30 | End: 2017-06-30

## 2017-06-30 RX ADMIN — MIDODRINE HYDROCHLORIDE 10 MG: 5 TABLET ORAL at 08:13

## 2017-06-30 RX ADMIN — INSULIN ASPART 1 UNITS: 100 INJECTION, SOLUTION INTRAVENOUS; SUBCUTANEOUS at 08:22

## 2017-06-30 RX ADMIN — APIXABAN 2.5 MG: 2.5 TABLET, FILM COATED ORAL at 19:54

## 2017-06-30 RX ADMIN — SULFAMETHOXAZOLE AND TRIMETHOPRIM 1 TABLET: 800; 160 TABLET ORAL at 19:54

## 2017-06-30 RX ADMIN — ALBUMIN (HUMAN) 50 G: 12.5 SOLUTION INTRAVENOUS at 11:15

## 2017-06-30 RX ADMIN — SULFAMETHOXAZOLE AND TRIMETHOPRIM 1 TABLET: 800; 160 TABLET ORAL at 08:14

## 2017-06-30 RX ADMIN — GABAPENTIN 200 MG: 100 CAPSULE ORAL at 19:54

## 2017-06-30 RX ADMIN — SODIUM CHLORIDE, PRESERVATIVE FREE 5 ML: 5 INJECTION INTRAVENOUS at 00:16

## 2017-06-30 RX ADMIN — Medication 2 G: at 08:39

## 2017-06-30 RX ADMIN — GABAPENTIN 200 MG: 100 CAPSULE ORAL at 14:25

## 2017-06-30 RX ADMIN — FUROSEMIDE 20 MG: 10 INJECTION, SOLUTION INTRAVENOUS at 11:12

## 2017-06-30 RX ADMIN — SODIUM CHLORIDE, PRESERVATIVE FREE 5 ML: 5 INJECTION INTRAVENOUS at 21:32

## 2017-06-30 RX ADMIN — Medication 30 MG: at 08:14

## 2017-06-30 RX ADMIN — INSULIN ASPART 3 UNITS: 100 INJECTION, SOLUTION INTRAVENOUS; SUBCUTANEOUS at 11:28

## 2017-06-30 RX ADMIN — GABAPENTIN 200 MG: 100 CAPSULE ORAL at 08:13

## 2017-06-30 RX ADMIN — SODIUM CHLORIDE, PRESERVATIVE FREE 5 ML: 5 INJECTION INTRAVENOUS at 07:01

## 2017-06-30 RX ADMIN — MIDODRINE HYDROCHLORIDE 10 MG: 5 TABLET ORAL at 12:47

## 2017-06-30 RX ADMIN — MULTIPLE VITAMINS W/ MINERALS TAB 1 TABLET: TAB at 08:15

## 2017-06-30 RX ADMIN — INSULIN ASPART 2 UNITS: 100 INJECTION, SOLUTION INTRAVENOUS; SUBCUTANEOUS at 16:25

## 2017-06-30 RX ADMIN — POTASSIUM CHLORIDE 40 MEQ: 1.5 POWDER, FOR SOLUTION ORAL at 22:48

## 2017-06-30 RX ADMIN — FUROSEMIDE 10 MG/HR: 10 INJECTION, SOLUTION INTRAVENOUS at 11:09

## 2017-06-30 RX ADMIN — APIXABAN 2.5 MG: 2.5 TABLET, FILM COATED ORAL at 08:13

## 2017-06-30 RX ADMIN — PREDNISONE 10 MG: 10 TABLET ORAL at 08:13

## 2017-06-30 RX ADMIN — FOLIC ACID 1 MG: 1 TABLET ORAL at 08:14

## 2017-06-30 RX ADMIN — ATENOLOL 50 MG: 50 TABLET ORAL at 08:13

## 2017-06-30 RX ADMIN — POTASSIUM CHLORIDE 20 MEQ: 1.5 POWDER, FOR SOLUTION ORAL at 13:34

## 2017-06-30 RX ADMIN — ACETAMINOPHEN 650 MG: 325 TABLET, FILM COATED ORAL at 19:54

## 2017-06-30 RX ADMIN — CALCIUM CARBONATE 600 MG (1,500 MG)-VITAMIN D3 400 UNIT TABLET 2 TABLET: at 08:14

## 2017-06-30 RX ADMIN — MICONAZOLE NITRATE: 2 POWDER TOPICAL at 11:31

## 2017-06-30 RX ADMIN — MIDODRINE HYDROCHLORIDE 10 MG: 5 TABLET ORAL at 17:30

## 2017-06-30 RX ADMIN — Medication 2 G: at 18:36

## 2017-06-30 RX ADMIN — MICONAZOLE NITRATE: 2 POWDER TOPICAL at 19:55

## 2017-06-30 RX ADMIN — DIGOXIN 125 MCG: 125 TABLET ORAL at 08:14

## 2017-06-30 RX ADMIN — FUROSEMIDE 10 MG/HR: 10 INJECTION, SOLUTION INTRAVENOUS at 18:27

## 2017-06-30 RX ADMIN — PANTOPRAZOLE SODIUM 40 MG: 40 TABLET, DELAYED RELEASE ORAL at 05:44

## 2017-06-30 RX ADMIN — ALBUMIN (HUMAN) 50 G: 12.5 SOLUTION INTRAVENOUS at 22:57

## 2017-06-30 NOTE — PLAN OF CARE
Problem: Goal Outcome Summary  Goal: Goal Outcome Summary  Outcome: Therapy, progress towards functional goals is fair  OT-6C: Pt demonstrated impaired fine motor control in L UE. Pt required Min/Mod A for rolling to bilateral sides for sling placement. Pt required dependent transfer from bed->chair. Pt required SBA with set up for self-cares. Pt Sp02 between 93-96%. Pt tolerated therapy well. VSS.   REC: Discharge to TCU to increase activity tolerance and independence in ADLs.

## 2017-06-30 NOTE — PROVIDER NOTIFICATION
Patient's LifeVest keeps alarming that there is poor connection. The vest has been repositioned, battery changed, and electrodes cleaned. Since pt is fluid up and slightly weeping in abdominal area, the problem persists and the vest cannot maintain a good connection. Pt requesting to take vest off.   [employer] notified and Berenice Thomason approved removing LifeVest. Offered to keep portable Zoll in the room for patient comfort, pt declined. Will continue to monitor and update team as needed.

## 2017-06-30 NOTE — PROGRESS NOTES
Patient VSS. Patient in A-Fib in 90's-120's for HR. Patient on a general diet and calorie count. Orders placed to continue albumin infusion. TF running at 40 mL/hr. Patients intake 7547-6051 was 2,180 and output was 5,800 via brand catheter. Lasix running 10 mg/hr, Albumin running concurrently at 25 mL/hr as well. Dependent edema noted. +3 edema in bilateral hip, flank, groin, and legs. Patient is generally incontinent with stools but can tell you when she needs to be changed. Life Vest is still of because of edema making it a small fit (it is next to ledge next to window by the room). BG stable and corrected with 2 units of novolog. Will continue to monitor and will notify Cards 1 with any pertinent changes.    Dg HERNANDEZ

## 2017-06-30 NOTE — PLAN OF CARE
Problem: Goal Outcome Summary  Goal: Goal Outcome Summary  PT/6C     Pt transferred from bed<>moveo with lift and assist of 2. Pt performed partial weight bearing and squats while primarily at 30 degrees. Pt in good spirits and motivated to work with PT. VSS throughout session.     Recommend discharge to ARU when medically appropriate given continued progress and participation in therapy as patient is well below prior level of functioning.

## 2017-06-30 NOTE — PROGRESS NOTES
CLINICAL NUTRITION SERVICES     Nutrition Prescription    Future/Additional Recommendations:  For all recommendations, see prior nutrition notes. Continue TFs as ordered to meet 100% of nutrition needs.     Kcal counts:  6/27      76 kcals and 2 g protein (meal/s and no supplement/s recorded)  6/28    524 kcals and 32 g protein (meal/s and 100% of one Beneprotein supplement/s recorded)   *  Pt consumed a two-day average of 300 kcals and 17 g protein daily. This does not meet estimated needs of 2451-2834 kcals/day (25 - 30 kcals/kg) and 61-77 grams protein/day (1.2 - 1.5 grams of pro/kg).    INTERVENTIONS:  Implementation:  None additionally at this time.    Follow up/Monitoring:  Will continue to follow pt.    Inés Brown, MS, RD, LD, [facility]   6C Pgr:  [phone]

## 2017-06-30 NOTE — PROGRESS NOTES
Social Work Services Progress Note    Hospital Day: 12  Date of Initial Social Work Evaluation:  6/20/17  Collaborated with:  Cards 1 team, Pt, Spouse    Data:  [facility] 56 [facility]  Pt was then transferred to 5th floor medicine team.  Pt is now a return to  and Cards 1 team for further cardiac work up.      Intervention:  SW met with spouse to again to continue working on FMLA for [employer].  Spouse will work with insurance for his time off.  Pt states no needs.  Will continue to follow for ARU placement once medically stable.  Unclear on discharge date.     Assessment:  No changes, pt continues to have daily care from spouse.    Plan:    Anticipated Disposition:  [facility]    Barriers to d/c plan:  Medical stability    Follow Up:   follow for discharge planning.    DENNIS Larsen, APSW  6C Unit   Phone: [phone]  Pager: [phone]  Unit: [phone]      ___________________________________________________________________________________________________________________________________________________    Referrals in process:    ARU - return when medically stable.     Referrals Discontinued:  None    Community Case Management/Community Services in place:   None

## 2017-06-30 NOTE — PROGRESS NOTES
Cardiology Daily Note   Date of Service: 6/30/2017  Patient: Amelia Michel  MRN: [id number]  Admission Date: 6/19/2017  Hospital Day # 11    Assessment & Plan:   Amelia Michel is a 56 year old female with PMHx of VSD s/p repari (1969), RA, recently diagnosed Atrial fibrillation that was complicated with an OOH cardiac arrest (felt 2/2 long pause with degeneration to VFib while converting Afib to SR with multiple AV prieto agents) who was readmitted from ARU on 6/19 with fever and concern for worsening LUE infection. She is now transferred back to cardiology service as primary on 6/26 for further workup and evaluation of recurrent lactic acidosis, persistent afib and softer blood pressures with tachypnea.         Changes Today:  - Lasix 20mg IV with albumin followed by gtt 10/ml/hr  - Hgb 7.6, transfuse 1 unit PRBC      # Volume overload/anasarca:  # HFrEF (EF 50-55%):  # Hypotension:  Patient is up approximately 35lbs from 6/13/17 (157lbs -> 190lbs) over the coarse of her complicated hospitalization for OOH arrest, subsequent visit at the ARU and now readmission for evaluation of possible LUE infection and sepsis. She is severely malnourished and has an albumin of 1.9 which would suggest third spacing of the majority of her fluid making her a challenging candidate for diuresis. Over the past 24 -48 hours she has had softer blood pressures with systolics 80-90s. She is total body volume up and intravascularly deplete.  - Right heart cath 6/26 with mildly elevated right and left sided filling presures  - Diuretics: Lasix 20mg IV x1 followed by gtt at 5mg/hr 6/28-6/29, continue IV lasix 20mg followed gtt at 10ml/hr to be given today (6/30) with albumin  - BP better controlled in 100s/80s, continuing midodrine   - Cardiology heart failure consult, appreciate recs    # Atrial fibrillation/flutter with RVR:  RVR in setting of volume overload above.  - Continue dig 125mcg  - Continue atenolol 50mg daily  -  Management of volume overload as above  - Continue apixaban     # Out of hospital arrest:  - Pt will need to wear LifeVest until medically appropriate and cleared from infection/wound standpoint for ICD implantation likely 4-6 weeks after discharge per EP (pending hospital course for LUE wound)    # LUE wound and soft tissue infection:  Most consistent with cellulitis. Wound culture on 6/19 grew Enterobacter cloacae complex, Enterococcus faecalis and coagulase negative staphylococcus.  - ID following appreciate recs   - Rec to DC ertapenem and start bactrim 1DS q12h for LUE wound (will defer until assessment of SVR with RHC given concern lactic acidosis may be related to sepsis of other unclear source)  - 6/19 L Elbow XR:  Negative for osteo, diffuse subcutaneous soft tissue stranding, possibly representative of cellulitis   - 6/19 L Elbow MRI: Diffuse cellulitis and multicompartment soft tissue tissue edema within the muscles about the left elbow, with a focal area of nonenhancement,  centered in the brachialis muscle. Differential includes myositis,including infectious or inflammatory etiology. A focal area of myonecrosis is likely within the brachialis muscle. Correlate clinically.  --General Surgery: 06/19: no surgical indication.   --Pain control: Tramadol 25mg q6h prn, Continue morphine gel around edges of wound for pain   - WOCN    # Lactic acidosis: Resolved  # SIRS (Tachycardia, leukopenia):  Patient with recurrent lactic acidosis, last 2.8 -> 2.3 (mildly elevated). DDx sepsis, cardiogenic shock (patient work, EF only mildly reduced), no e/o limb ischemia, DKA, or seizure. She has been afebrile, her procal is elevated but improving. Her CXR does have basilar opacities with question for possible PNA which may be a potential source. Blood cultures with no growth.  - Pan culture 6/26   - BCx (6/26): In process   - UA/UCx:   - Enteric panel negative  - Wound culture (left upper extremity, 6/19): Heavy growth  Enterobacter cloacae complex, Moderate growth Enterococcus faecalis, Moderate growth Coagulase negative Staphylococcus       # Acute on Chronic Thrombocytopenia:   2/2 hyporoliferative bone marrow. Plts noted to decrease from 104 to as low as 15 during recent hospitalization requiring transfusion w/ chronically low in the 's as outpatient.  -- If this is chronic ITP, no need for treatment unless plts drift < 30s Transfused 1u plts 6/26 for plts 25  -- Continue to hold RA meds for now  -- Apixaban 2.5 mg BID (held 6/26-6/27, resumed 6/28). This will need to be held 2 days prior to ICD placement after discharge (this will be deferred given acute infection).    # Acute on chronic anemia: Stable  Hemoglobin drop from 8.8 (6/26) --> 6.9. No evidence of overt bleeding (stool light brown). T Bili normal.  - Transfuse 1u PRBC 6/28, transfusing another unit today  - AM CBC  - Resumed apixaban    # RA:   History of seropositive rheumatoid arthritis (anti-CCP positive) since late 1980;s w/ multiple MTP osteotomies, partial right wrist fusion, longstanding therapy consisting of MTX, predisone and HCQ  -- Continue with Prednisone taper. Now on 10 mg daily, taper to 7.5 mg on 7/1, then to 5 mg daily after 2 weeks if continues to have low disease activity  -- Continue to hold HCQ and MTX until outpatient follow-up  -- Follow-up with [facility] Dr. Conor Cunha in 4-6 weeks after discharge      # Severe Critical Illness Myopathy:   Significant deconditioning w/ median neuropathies most likely localized to the wrists and ulnar neuropathies most likely localized to the elbows secondary to RA.  -- Ongoing extensive PT/OT/SLP      # Malnutrition:   -- Nutrition recommendations are to continue Peptamen 1.5 Tube feeds to 40 ml/hr x 11 hours which will provide 440 ml TF, 660 kcals, and 30 g protein daily. TFs may be adjusted pending oral intake. Once kcal counts reveal that patient is consuming at least 900 kcals and  "45 g protein daily on average can discontinue.  -- Water flushes as needed.  -- Nutrition on board.     Consulting Services: Nutrition, ID, heart failure service    CODE: Full Code  DVT Ppx: Apixaban resumed 6/28  Diet/Fluids: As above.  Disposition: Pending improvement in above.    Pt's care was discussed with bedside RN and patient during Care Team Rounds.    Patient was discussed and evaluated with Dr. Trent.    La Nena Benz MD  Internal Medicine R1  Pager: [phone]    ___________________________________________________________________    Subjective & Interval History:     Last 24 hr care team notes reviewed. No acute events overnight. She feels well this AM but gets short of breath with moving to the chair although feels good sitting. Did not wear life vest last night due to constant beeping.   Medications: Reviewed in EPIC. List below for reference    Physical Exam:    Blood pressure 98/67, pulse 119, temperature 97.8  F (36.6  C), temperature source Oral, resp. rate 20, height 1.48 m (4' 10.25\"), weight 86.4 kg (190 lb 8 oz), SpO2 98 %, not currently breastfeeding.    CONSTITUTIONAL:  Ms. Michel is sitting up in bed in no apparent distress.  HEENT:  NC/AT.  OP Clear.  MMM. PERRLA.  EOMI.   LUNGS: Mildly tachypneic, no increased work of breathing. Lungs clear in anterior fields, mild crackles in bases. No wheezes appreciated.  CARDIOVASCULAR: Irregularly irregular, tachycardic in 110s w/ no M/R/G.   ABDOMEN: Soft, distended (edematous abdominal wall), NT, BS +ve.   MUSCULOSKELETAL:  Moves all four extremities without difficulty. Radial and DP pulses intact.  Diffuse anasarca.  NEURO: A&Ox3, CN II-XII grossly intact, non-focal.   PSYCHIATRIC:  Normal affect.  SKIN: Warm, Dry, No rashes.     Lines/Tubes: Rectal tube, brand catheter  PICC Triple Lumen 06/26/17 Right Basilic ok to use. (Active)   Site Assessment WDL 6/26/2017  1:29 PM   External Cath Length (cm) 3 cm 6/26/2017  1:29 PM   Extremity " Circumference (cm) 35 cm 6/26/2017  1:29 PM   Dressing Intervention Chlorhexidine patch;Transparent;Securing device;New dressing 6/26/2017  1:29 PM   Dressing Change Due 07/03/17 6/26/2017  1:29 PM   PICC Lumen Assessment Trigger Red;White;Gray 6/26/2017  1:29 PM   Number of days:0       Labs & Studies of Note: Reviewed in Epic  CMP    Recent Labs  Lab 06/30/17  0703 06/29/17  0554 06/28/17  1754 06/28/17  0555 06/27/17  0826 06/26/17  0647 06/25/17  0710 06/24/17  2221 06/24/17  0623    136 135 138 138 134 133 132*  --    POTASSIUM 3.6 4.1 4.1 4.2 4.7 4.8 4.6 4.9 4.9   CHLORIDE 107 108 108 109 110* 106 106 106  --    CO2 23 22 20 23 21 17* 18* 18*  --    ANIONGAP 8 6 6 6 7 10 10 8  --    * 162* 197* 165* 184* 166* 143* 151*  --    BUN 43* 45* 38* 43* 39* 42* 47* 47*  --    CR 0.75 0.72 0.68 0.65 0.65 0.72 0.85 0.85  --    GFRESTIMATED 80 84 90 >90Non  GFR Calc >90Non  GFR Calc 84 69 69  --    GFRESTBLACK >90African American GFR Calc >90African American GFR Calc >90African American GFR Calc >90African American GFR Calc >90African American GFR Calc >90African American GFR Calc 83 84  --    VIKTORIYA 7.4* 7.2* 7.2* 7.4* 7.3* 7.5* 7.3* 7.1*  --    MAG  --   --   --   --   --  2.1 2.4* 1.9 1.9   PHOS  --   --   --   --   --   --   --  3.4  --    PROTTOTAL  --   --   --  3.9* 4.2*  --   --   --   --    ALBUMIN  --   --   --  1.5* 1.4*  --   --   --   --    BILITOTAL  --   --   --  0.8 0.6  --   --   --   --    ALKPHOS  --   --   --  126 129  --   --   --   --    AST  --   --   --  41 54*  --   --   --   --    ALT  --   --   --  50 60*  --   --   --   --        CBC    Recent Labs  Lab 06/30/17  0703 06/29/17  0554 06/28/17  1609 06/28/17  0555   WBC 2.3* 3.0* 3.0* 2.4*   RBC 2.38* 2.89* 2.89* 2.11*   HGB 7.6* 8.9* 9.2* 6.9*   HCT 21.9* 26.3* 26.7* 20.0*   PLT 26* 27* 27* 27*       INR    Recent Labs  Lab 06/28/17  1609   INR 1.31*       Unresulted Labs Ordered in the Past 30 Days of  this Admission     Date and Time Order Name Status Description    6/26/2017 0935 Blood culture Preliminary     6/26/2017 0935 Blood culture Preliminary     5/29/2017 1046 Adalimumab Activity and Neutralizing Antibodies: Laboratory Miscellaneous Order In process           Medication List for Reference (delete if desired)  Current Facility-Administered Medications   Medication     furosemide (LASIX) injection 20 mg     furosemide (LASIX) 100 mg in NaCl 0.9 % 100 mL infusion     albumin human 25 % injection 50 g     apixaban ANTICOAGULANT (ELIQUIS) tablet 2.5 mg     midodrine (PROAMATINE) tablet 10 mg     sulfamethoxazole-trimethoprim (BACTRIM DS/SEPTRA DS) 800-160 MG per tablet 1 tablet     lidocaine (LMX4) kit     calcium carbonate (TUMS) chewable tablet 500 mg     pantoprazole (PROTONIX) EC tablet 40 mg     digoxin (LANOXIN) tablet 125 mcg     ondansetron (ZOFRAN) tablet 4 mg     prochlorperazine (COMPAZINE) injection 5-10 mg     ondansetron (ZOFRAN) injection 4 mg     simethicone (MYLICON) chewable tablet 80 mg     sodium chloride (PF) 0.9% PF flush 10-20 mL     sodium chloride (PF) 0.9% PF flush 10 mL     heparin lock flush 10 UNIT/ML injection 5-10 mL     heparin lock flush 10 UNIT/ML injection 5-10 mL     dextrose 10 % 1,000 mL infusion     atenolol (TENORMIN) tablet 50 mg     folic acid (FOLVITE) tablet 1 mg     gabapentin (NEURONTIN) capsule 200 mg     melatonin tablet 3 mg     morphine 0.1% in solosite topical gel 2 g     multivitamin, therapeutic with minerals (THERA-VIT-M) tablet 1 tablet     triamcinolone (KENALOG) 0.1 % ointment     lidocaine 1 % 1 mL     lidocaine (LMX4) kit     sodium chloride (PF) 0.9% PF flush 3 mL     sodium chloride (PF) 0.9% PF flush 3 mL     medication instruction     acetaminophen (TYLENOL) tablet 650 mg     acetaminophen (TYLENOL) Suppository 650 mg     Patient is already receiving anticoagulation with heparin, enoxaparin (LOVENOX), warfarin (COUMADIN)  or other anticoagulant  medication     glucose 40 % gel 15-30 g    Or     dextrose 50 % injection 25-50 mL    Or     glucagon injection 1 mg     insulin aspart (NovoLOG) inj (RAPID ACTING)     insulin aspart (NovoLOG) inj (RAPID ACTING)     naloxone (NARCAN) injection 0.1-0.4 mg     calcium-vitamin D (CALTRATE) 600-400 MG-UNIT per tablet 2 tablet     co-enzyme Q-10 capsule 30 mg     morphine 0.1% in solosite topical gel     potassium chloride SA (K-DUR/KLOR-CON M) CR tablet 20-40 mEq     potassium chloride (KLOR-CON) Packet 20-40 mEq     potassium chloride 10 mEq in 100 mL intermittent infusion     potassium chloride 10 mEq in 100 mL intermittent infusion with 10 mg lidocaine     potassium chloride 20 mEq in 50 mL intermittent infusion     magnesium sulfate 2 g in NS intermittent infusion (PharMEDium or FV Cmpd)     magnesium sulfate 4 g in 100 mL sterile water (premade)     sodium phosphate 10 mmol in D5W intermittent infusion     sodium phosphate 15 mmol in D5W intermittent infusion     sodium phosphate 20 mmol in D5W intermittent infusion     sodium phosphate 25 mmol in D5W intermittent infusion     melatonin tablet 1 mg     traMADol (ULTRAM) half-tab 25 mg     predniSONE (DELTASONE) tablet 10 mg

## 2017-06-30 NOTE — PLAN OF CARE
Problem: Goal Outcome Summary  Goal: Goal Outcome Summary  SLP: Pt seen at bedside for f/u language tx. Pt reports feeling 'foggy' today, but performed well on all storytelling and word finding activities with minimal cueing. SLP recommended that pt practice telling stories to family and visitors, using their questions as feedback. Also recommended to practice word finding via crossword puzzles, using generative naming and definitions of multiple meaning words as strategies. SLP to f/u per POC.

## 2017-06-30 NOTE — PLAN OF CARE
Problem: Goal Outcome Summary  Goal: Goal Outcome Summary  Outcome: Improving     D: Hx VSD repair (1969), RA, afib/flutter/SVT. Recently admitted 5/29-6/15  after an OOH cardiac arrest. Returned to hospital 6/19 with LUE wound pain and fever.   I/A: VSSA, on 2L O2 overnight. Monitor shows aflutter. Pt reports L hand pain controlled with prn tylenol x1. TF running at 40 ml/hr through NG. Lasix gtt stopped at 2330. [place] with adequate UO. Rectal tube patent, bag changed. Turned/repositioned q2-3h and mary care prn. Lymph wraps on BLE. LifeVest removed overnight, see provider notification. BG stable, corrected with 1 unit novolog at HS. Sleeping between cares.  P: Pt triggered sepsis protocol this morning, awaiting LA results. Continue to monitor and notify Cards 1 with pertinent changes.

## 2017-07-01 ENCOUNTER — APPOINTMENT (OUTPATIENT)
Dept: CT IMAGING | Facility: CLINIC | Age: 57
DRG: 871 | End: 2017-07-01
Attending: INTERNAL MEDICINE
Payer: COMMERCIAL

## 2017-07-01 ENCOUNTER — APPOINTMENT (OUTPATIENT)
Dept: OCCUPATIONAL THERAPY | Facility: CLINIC | Age: 57
DRG: 871 | End: 2017-07-01
Attending: INTERNAL MEDICINE
Payer: COMMERCIAL

## 2017-07-01 ENCOUNTER — APPOINTMENT (OUTPATIENT)
Dept: ULTRASOUND IMAGING | Facility: CLINIC | Age: 57
DRG: 871 | End: 2017-07-01
Attending: INTERNAL MEDICINE
Payer: COMMERCIAL

## 2017-07-01 ENCOUNTER — APPOINTMENT (OUTPATIENT)
Dept: GENERAL RADIOLOGY | Facility: CLINIC | Age: 57
DRG: 871 | End: 2017-07-01
Attending: STUDENT IN AN ORGANIZED HEALTH CARE EDUCATION/TRAINING PROGRAM
Payer: COMMERCIAL

## 2017-07-01 LAB
ALBUMIN UR-MCNC: NEGATIVE MG/DL
ANION GAP SERPL CALCULATED.3IONS-SCNC: 8 MMOL/L (ref 3–14)
APPEARANCE UR: CLEAR
BASOPHILS # BLD AUTO: 0 10E9/L (ref 0–0.2)
BASOPHILS NFR BLD AUTO: 0.6 %
BILIRUB UR QL STRIP: NEGATIVE
BUN SERPL-MCNC: 37 MG/DL (ref 7–30)
CALCIUM SERPL-MCNC: 8 MG/DL (ref 8.5–10.1)
CHLORIDE SERPL-SCNC: 102 MMOL/L (ref 94–109)
CO2 SERPL-SCNC: 27 MMOL/L (ref 20–32)
COLOR UR AUTO: ABNORMAL
CREAT SERPL-MCNC: 0.72 MG/DL (ref 0.52–1.04)
DIFFERENTIAL METHOD BLD: ABNORMAL
EOSINOPHIL # BLD AUTO: 0 10E9/L (ref 0–0.7)
EOSINOPHIL NFR BLD AUTO: 2.2 %
ERYTHROCYTE [DISTWIDTH] IN BLOOD BY AUTOMATED COUNT: 21.3 % (ref 10–15)
GFR SERPL CREATININE-BSD FRML MDRD: 84 ML/MIN/1.7M2
GLUCOSE BLDC GLUCOMTR-MCNC: 125 MG/DL (ref 70–99)
GLUCOSE BLDC GLUCOMTR-MCNC: 133 MG/DL (ref 70–99)
GLUCOSE BLDC GLUCOMTR-MCNC: 138 MG/DL (ref 70–99)
GLUCOSE BLDC GLUCOMTR-MCNC: 140 MG/DL (ref 70–99)
GLUCOSE SERPL-MCNC: 166 MG/DL (ref 70–99)
GLUCOSE UR STRIP-MCNC: NEGATIVE MG/DL
HCT VFR BLD AUTO: 25.4 % (ref 35–47)
HGB BLD-MCNC: 8.5 G/DL (ref 11.7–15.7)
HGB UR QL STRIP: NEGATIVE
IMM GRANULOCYTES # BLD: 0 10E9/L (ref 0–0.4)
IMM GRANULOCYTES NFR BLD: 0.6 %
KETONES UR STRIP-MCNC: NEGATIVE MG/DL
LACTATE BLD-SCNC: 1.7 MMOL/L (ref 0.7–2.1)
LACTATE BLD-SCNC: 1.9 MMOL/L (ref 0.7–2.1)
LACTATE BLD-SCNC: 2.3 MMOL/L (ref 0.7–2.1)
LACTATE BLD-SCNC: 3.1 MMOL/L (ref 0.7–2.1)
LEUKOCYTE ESTERASE UR QL STRIP: NEGATIVE
LYMPHOCYTES # BLD AUTO: 0.5 10E9/L (ref 0.8–5.3)
LYMPHOCYTES NFR BLD AUTO: 26.1 %
MAGNESIUM SERPL-MCNC: 1.4 MG/DL (ref 1.6–2.3)
MAGNESIUM SERPL-MCNC: 2.3 MG/DL (ref 1.6–2.3)
MCH RBC QN AUTO: 30.1 PG (ref 26.5–33)
MCHC RBC AUTO-ENTMCNC: 33.5 G/DL (ref 31.5–36.5)
MCV RBC AUTO: 90 FL (ref 78–100)
MONOCYTES # BLD AUTO: 0.2 10E9/L (ref 0–1.3)
MONOCYTES NFR BLD AUTO: 8.3 %
NEUTROPHILS # BLD AUTO: 1.1 10E9/L (ref 1.6–8.3)
NEUTROPHILS NFR BLD AUTO: 62.2 %
NITRATE UR QL: NEGATIVE
NRBC # BLD AUTO: 0 10*3/UL
NRBC BLD AUTO-RTO: 0 /100
PH UR STRIP: 6 PH (ref 5–7)
PLATELET # BLD AUTO: 20 10E9/L (ref 150–450)
POTASSIUM SERPL-SCNC: 3.1 MMOL/L (ref 3.4–5.3)
POTASSIUM SERPL-SCNC: 4 MMOL/L (ref 3.4–5.3)
PROCALCITONIN SERPL-MCNC: 0.7 NG/ML
RBC # BLD AUTO: 2.82 10E12/L (ref 3.8–5.2)
SODIUM SERPL-SCNC: 137 MMOL/L (ref 133–144)
SP GR UR STRIP: 1 (ref 1–1.03)
URN SPEC COLLECT METH UR: ABNORMAL
UROBILINOGEN UR STRIP-MCNC: NORMAL MG/DL (ref 0–2)
WBC # BLD AUTO: 1.9 10E9/L (ref 4–11)

## 2017-07-01 PROCEDURE — 25000125 ZZHC RX 250: Performed by: INTERNAL MEDICINE

## 2017-07-01 PROCEDURE — 80048 BASIC METABOLIC PNL TOTAL CA: CPT | Performed by: PHYSICAL MEDICINE & REHABILITATION

## 2017-07-01 PROCEDURE — 71010 XR CHEST PORT 1 VW: CPT

## 2017-07-01 PROCEDURE — 25000132 ZZH RX MED GY IP 250 OP 250 PS 637: Performed by: INTERNAL MEDICINE

## 2017-07-01 PROCEDURE — 84145 PROCALCITONIN (PCT): CPT | Performed by: PHYSICAL MEDICINE & REHABILITATION

## 2017-07-01 PROCEDURE — 27210437 ZZH NUTRITION PRODUCT SEMIELEM INTERMED LITER

## 2017-07-01 PROCEDURE — 00000146 ZZHCL STATISTIC GLUCOSE BY METER IP

## 2017-07-01 PROCEDURE — 25000128 H RX IP 250 OP 636: Performed by: STUDENT IN AN ORGANIZED HEALTH CARE EDUCATION/TRAINING PROGRAM

## 2017-07-01 PROCEDURE — 99233 SBSQ HOSP IP/OBS HIGH 50: CPT | Mod: GC | Performed by: INTERNAL MEDICINE

## 2017-07-01 PROCEDURE — 25000132 ZZH RX MED GY IP 250 OP 250 PS 637: Performed by: NURSE PRACTITIONER

## 2017-07-01 PROCEDURE — 25000125 ZZHC RX 250: Performed by: STUDENT IN AN ORGANIZED HEALTH CARE EDUCATION/TRAINING PROGRAM

## 2017-07-01 PROCEDURE — 25000125 ZZHC RX 250: Performed by: RADIOLOGY

## 2017-07-01 PROCEDURE — 36592 COLLECT BLOOD FROM PICC: CPT | Performed by: PHYSICAL MEDICINE & REHABILITATION

## 2017-07-01 PROCEDURE — 25000132 ZZH RX MED GY IP 250 OP 250 PS 637: Performed by: STUDENT IN AN ORGANIZED HEALTH CARE EDUCATION/TRAINING PROGRAM

## 2017-07-01 PROCEDURE — 25000128 H RX IP 250 OP 636: Performed by: RADIOLOGY

## 2017-07-01 PROCEDURE — 36592 COLLECT BLOOD FROM PICC: CPT | Performed by: STUDENT IN AN ORGANIZED HEALTH CARE EDUCATION/TRAINING PROGRAM

## 2017-07-01 PROCEDURE — 83605 ASSAY OF LACTIC ACID: CPT | Performed by: STUDENT IN AN ORGANIZED HEALTH CARE EDUCATION/TRAINING PROGRAM

## 2017-07-01 PROCEDURE — 83735 ASSAY OF MAGNESIUM: CPT | Performed by: PHYSICAL MEDICINE & REHABILITATION

## 2017-07-01 PROCEDURE — 40000133 ZZH STATISTIC OT WARD VISIT

## 2017-07-01 PROCEDURE — 97140 MANUAL THERAPY 1/> REGIONS: CPT | Mod: GO

## 2017-07-01 PROCEDURE — 36415 COLL VENOUS BLD VENIPUNCTURE: CPT | Performed by: STUDENT IN AN ORGANIZED HEALTH CARE EDUCATION/TRAINING PROGRAM

## 2017-07-01 PROCEDURE — 25000128 H RX IP 250 OP 636: Performed by: INTERNAL MEDICINE

## 2017-07-01 PROCEDURE — 74177 CT ABD & PELVIS W/CONTRAST: CPT

## 2017-07-01 PROCEDURE — 81003 URINALYSIS AUTO W/O SCOPE: CPT | Performed by: STUDENT IN AN ORGANIZED HEALTH CARE EDUCATION/TRAINING PROGRAM

## 2017-07-01 PROCEDURE — 25000131 ZZH RX MED GY IP 250 OP 636 PS 637: Performed by: STUDENT IN AN ORGANIZED HEALTH CARE EDUCATION/TRAINING PROGRAM

## 2017-07-01 PROCEDURE — 21400006 ZZH R&B CCU INTERMEDIATE UMMC

## 2017-07-01 PROCEDURE — 25000132 ZZH RX MED GY IP 250 OP 250 PS 637

## 2017-07-01 PROCEDURE — 87040 BLOOD CULTURE FOR BACTERIA: CPT | Performed by: STUDENT IN AN ORGANIZED HEALTH CARE EDUCATION/TRAINING PROGRAM

## 2017-07-01 PROCEDURE — 84132 ASSAY OF SERUM POTASSIUM: CPT | Performed by: PHYSICAL MEDICINE & REHABILITATION

## 2017-07-01 PROCEDURE — 83605 ASSAY OF LACTIC ACID: CPT | Performed by: PHYSICAL MEDICINE & REHABILITATION

## 2017-07-01 PROCEDURE — 76882 US LMTD JT/FCL EVL NVASC XTR: CPT | Mod: LT

## 2017-07-01 PROCEDURE — 85004 AUTOMATED DIFF WBC COUNT: CPT | Performed by: STUDENT IN AN ORGANIZED HEALTH CARE EDUCATION/TRAINING PROGRAM

## 2017-07-01 PROCEDURE — 85027 COMPLETE CBC AUTOMATED: CPT | Performed by: STUDENT IN AN ORGANIZED HEALTH CARE EDUCATION/TRAINING PROGRAM

## 2017-07-01 RX ORDER — IOPAMIDOL 755 MG/ML
115 INJECTION, SOLUTION INTRAVASCULAR ONCE
Status: COMPLETED | OUTPATIENT
Start: 2017-07-01 | End: 2017-07-01

## 2017-07-01 RX ORDER — CEFEPIME HYDROCHLORIDE 1 G/1
1 INJECTION, POWDER, FOR SOLUTION INTRAMUSCULAR; INTRAVENOUS EVERY 12 HOURS
Status: DISCONTINUED | OUTPATIENT
Start: 2017-07-01 | End: 2017-07-01

## 2017-07-01 RX ORDER — MEROPENEM 1 G/1
1 INJECTION, POWDER, FOR SOLUTION INTRAVENOUS EVERY 8 HOURS
Status: DISCONTINUED | OUTPATIENT
Start: 2017-07-01 | End: 2017-07-05

## 2017-07-01 RX ADMIN — APIXABAN 2.5 MG: 2.5 TABLET, FILM COATED ORAL at 20:32

## 2017-07-01 RX ADMIN — Medication 30 MG: at 08:47

## 2017-07-01 RX ADMIN — DIGOXIN 125 MCG: 125 TABLET ORAL at 08:46

## 2017-07-01 RX ADMIN — MEROPENEM 1 G: 1 INJECTION, POWDER, FOR SOLUTION INTRAVENOUS at 22:00

## 2017-07-01 RX ADMIN — ATENOLOL 50 MG: 50 TABLET ORAL at 08:47

## 2017-07-01 RX ADMIN — GABAPENTIN 200 MG: 100 CAPSULE ORAL at 14:08

## 2017-07-01 RX ADMIN — FUROSEMIDE 10 MG/HR: 10 INJECTION, SOLUTION INTRAVENOUS at 04:38

## 2017-07-01 RX ADMIN — ACETAMINOPHEN 650 MG: 325 TABLET, FILM COATED ORAL at 14:08

## 2017-07-01 RX ADMIN — IOPAMIDOL 115 ML: 755 INJECTION, SOLUTION INTRAVENOUS at 17:30

## 2017-07-01 RX ADMIN — PANTOPRAZOLE SODIUM 40 MG: 40 TABLET, DELAYED RELEASE ORAL at 06:08

## 2017-07-01 RX ADMIN — GABAPENTIN 200 MG: 100 CAPSULE ORAL at 08:46

## 2017-07-01 RX ADMIN — INSULIN ASPART 1 UNITS: 100 INJECTION, SOLUTION INTRAVENOUS; SUBCUTANEOUS at 18:25

## 2017-07-01 RX ADMIN — POTASSIUM CHLORIDE 20 MEQ: 1.5 POWDER, FOR SOLUTION ORAL at 01:29

## 2017-07-01 RX ADMIN — MIDODRINE HYDROCHLORIDE 10 MG: 5 TABLET ORAL at 08:46

## 2017-07-01 RX ADMIN — INSULIN ASPART 1 UNITS: 100 INJECTION, SOLUTION INTRAVENOUS; SUBCUTANEOUS at 12:29

## 2017-07-01 RX ADMIN — SODIUM CHLORIDE, PRESERVATIVE FREE 5 ML: 5 INJECTION INTRAVENOUS at 18:07

## 2017-07-01 RX ADMIN — MIDODRINE HYDROCHLORIDE 10 MG: 5 TABLET ORAL at 18:21

## 2017-07-01 RX ADMIN — MICONAZOLE NITRATE: 2 POWDER TOPICAL at 20:33

## 2017-07-01 RX ADMIN — CALCIUM CARBONATE 600 MG (1,500 MG)-VITAMIN D3 400 UNIT TABLET 2 TABLET: at 08:46

## 2017-07-01 RX ADMIN — POTASSIUM CHLORIDE 20 MEQ: 1.5 POWDER, FOR SOLUTION ORAL at 12:29

## 2017-07-01 RX ADMIN — SODIUM CHLORIDE 500 ML: 9 INJECTION, SOLUTION INTRAVENOUS at 16:01

## 2017-07-01 RX ADMIN — SODIUM CHLORIDE, PRESERVATIVE FREE 79 ML: 5 INJECTION INTRAVENOUS at 17:31

## 2017-07-01 RX ADMIN — VANCOMYCIN HYDROCHLORIDE 1500 MG: 10 INJECTION, POWDER, LYOPHILIZED, FOR SOLUTION INTRAVENOUS at 13:24

## 2017-07-01 RX ADMIN — POTASSIUM CHLORIDE 40 MEQ: 1.5 POWDER, FOR SOLUTION ORAL at 10:09

## 2017-07-01 RX ADMIN — FOLIC ACID 1 MG: 1 TABLET ORAL at 08:47

## 2017-07-01 RX ADMIN — MULTIPLE VITAMINS W/ MINERALS TAB 1 TABLET: TAB at 08:46

## 2017-07-01 RX ADMIN — SODIUM CHLORIDE, PRESERVATIVE FREE 5 ML: 5 INJECTION INTRAVENOUS at 09:07

## 2017-07-01 RX ADMIN — MICONAZOLE NITRATE: 2 POWDER TOPICAL at 10:33

## 2017-07-01 RX ADMIN — SULFAMETHOXAZOLE AND TRIMETHOPRIM 1 TABLET: 800; 160 TABLET ORAL at 08:46

## 2017-07-01 RX ADMIN — MEROPENEM 1 G: 1 INJECTION, POWDER, FOR SOLUTION INTRAVENOUS at 14:42

## 2017-07-01 RX ADMIN — GABAPENTIN 200 MG: 100 CAPSULE ORAL at 20:32

## 2017-07-01 RX ADMIN — MAGNESIUM SULFATE IN WATER 4 G: 40 INJECTION, SOLUTION INTRAVENOUS at 10:09

## 2017-07-01 RX ADMIN — PREDNISONE 7.5 MG: 5 TABLET ORAL at 08:46

## 2017-07-01 RX ADMIN — Medication 2 G: at 14:14

## 2017-07-01 RX ADMIN — APIXABAN 2.5 MG: 2.5 TABLET, FILM COATED ORAL at 08:47

## 2017-07-01 RX ADMIN — MIDODRINE HYDROCHLORIDE 10 MG: 5 TABLET ORAL at 12:28

## 2017-07-01 NOTE — PLAN OF CARE
Problem: Goal Outcome Summary  Goal: Goal Outcome Summary  Monitor Afib 90s-100s with occasional PVCs. VSS(BP soft). Lasix gtt @ 10mg/hr-good UO. Albumin @ 25cc/hr. PM K+=3.1. Replaced with a total of 60 meq KCL, recheck with AM labs. Recheck hgb after PRBC=8.6. NJ with Peptamen 1.5 @ 40cc/hr, pt tolerating well. Incontinent of small, soft stool on PMs. No further stools overnight. Nan care done and barrier cream applied. Turned q2-3hours. Lymph wraps on BLE. Continue with current cares and notify MD with any issues or concerns.

## 2017-07-01 NOTE — PHARMACY-VANCOMYCIN DOSING SERVICE
Pharmacy Vancomycin Initial Note  Date of Service 2017  Patient's  1960  56 year old, female    Indication: Sepsis    Creatinine for last 3 days  2017:  5:54 PM Creatinine 0.68 mg/dL  2017:  5:54 AM Creatinine 0.72 mg/dL  2017:  7:03 AM Creatinine 0.75 mg/dL;  9:47 PM Creatinine 0.70 mg/dL  2017:  9:10 AM Creatinine 0.72 mg/dL  Current estimated CrCl = Estimated Creatinine Clearance: 117.1 mL/min (based on Cr of 0.72).    Recent Vancomycin Level(s) for last 3 days  No results found for requested labs within last 72 hours.      Vancomycin IV Administrations (past 72 hours)      No vancomycin orders with administrations in past 72 hours.                Nephrotoxins and other renal medications (Future)    Start     Dose/Rate Route Frequency Ordered Stop    17 2330  vancomycin (VANCOCIN) 1,250 mg in NaCl 0.9 % 250 mL intermittent infusion      1,250 mg Intravenous EVERY 12 HOURS 17 1115      17 1130  vancomycin (VANCOCIN) 1,500 mg in NaCl 0.9 % 250 mL intermittent infusion      20 mg/kg × 85 kg Intravenous ONCE 17 1103            Contrast Orders - past 72 hours     None                Plan:  1.  Start vancomycin  1500mg iv x one then 1250mg iv q12h (~ 1 week ago vancomycin levels were 20-25 mg/L on the same dose with similar SCr).   2.  Goal Trough Level: 15-20 mg/L   3.  Pharmacy will check trough levels as appropriate in 1-3 Days.    4. Serum creatinine levels will be ordered daily for the first week of therapy and at least twice weekly for subsequent weeks.    5. Maybee method utilized to dose vancomycin therapy: Method 2    Chacha Stallworth, Pharm.D., [facility]  Pager [phone]

## 2017-07-01 NOTE — PLAN OF CARE
Problem: Goal Outcome Summary  Goal: Goal Outcome Summary  PT / 6C - Cancel. Upon check-in, RN reporting pt not appropriate for PT this date and requesting to let pt sleep. Will reschedule.

## 2017-07-01 NOTE — CONSULTS
[facility] ID SERVICE CONSULTATION     Patient:  Amelia Michel   Date of birth 1960, Medical record number [id number]  Date of Visit:  07/01/2017  Date of Admission: 6/19/2017  Consult Requester: Dr. Trent         Assessment and Recommendations:     ASSESSMENT:  1. Fever, with ? developing sepsis no obvious infectious source  2. LUE wound and cellulitis was getting better  3. Leukopenia and thrombocytopenia  4. RA on prednisolone taper currently on 7.5 mg daily  5. Recent cardiac arrest and HFrEF (pending ICD placement)  6. H/o ESBL UTI s/p treatment  7. PCN allergy  8. Aflutter/Afib with RVR    RECOMMENDATIONS:  1. Discontinue TMP-SMX, coverage has been broadened and could contribute to leukopenia/thrombocytopenia  2. Agree vancomycin and meropenem, broad-spectrum empiric coverage for sepsis  3. Consider obtaining additional imaging to r/o occult infectious focus, abdomen and LUE (if concern is high)  4. Follow pending blood cultures, repeat with new fevers  5. If patient has diarrhea please repeat stool for c. Diff  6. Please trend inflammatory markers (CRP, procalcitonin)  7. Appreciate [facility] input and care    DISCUSSION:  This patient is known to ID service (see Dr. Chow note 6/29), has been improving clinically until she started having fevers and worsening leukopenia. She has had long hospitalizations since cardiac arrest 5/29. She was having LUE wound due to IV extravasation and cellulitis. Wound culture grew enterobacter, CONS, and enterococcus. She was improving on bactrim switched from ertapenem on 6/27. Patient denies new symptoms and stated improvement of LUE cellulitis in-relation to pain and swelling. There is no obvious new focus on exam, however given complicated and extended hospital course she is certainly a high risk for developing infection. We would recommend covering her broadly and starting to look for possible source. Discontinue TMP-SMX given broadened coverage and since this may  contribute to leukopenia and thrombocytopenia (meropenem will cover wound organisms).  WBC has been on low side for weeks, not clear if this is true trend.  Her CXR showed improve aeration of both lungs and pro calcitonin is normal. However, lactic acid was trending up to 3.1 with repeat of 1.7. Possible source could be LUE cellulitis (less likely) and abdominal source. C. Diff and blood cultures pending. UA with few bacteria. Imaging of abdomen and repeat imaging of LUE could be pursuit.    Antibiotics:  Vancomycin 5/29-31, 6/19-23, 7/1-  Meropenem 5/29-6/3, 7/1-  Ertapenem 6/3-4, 6/19-27  Bactrim 6/27-    Thank you for asking us to see this patient. We will continue to follow with you.  I am available over the weekend, please call with any questions or concerns.    Patient seen and discussed with Dr. Danielle, staff physician    Alonso Roach  ID Fellow [phone]    Attending Physician Attestation:  This patient has been seen and evaluated by me, Cornelio Danielle MD.  I have discussed this patient with the ID fellow and agree with the findings and recommendations in this note. I have reviewed the patient's History and today's Medications, Vital Signs, Labs and Imaging.     I have edited this note to reflect my findings.     This is a 57yo woman with a recent history of cardiac arrhythmia that was initially admitted to hospital after cardiac arrest roughly 1 month ago. She was discharged to ARU 2 weeks ago but was readmitted with worsening LUE wound and fevers. Initial wound thought to be secondary to IV extravasation. The wound grew Enterobacter, Enterococcus and Coag negative Staph and she received broad spectrum antibiotics for this.  Not clear how much cellulitis vs chemical burn. She was recently switched to TMP-SMX after ID consultation.  The wound has been improving steadily. Concern now for new fever and possible developing sepsis.  The wound seems somewhat underwhelming as source of infection. In addition to  fevers, she has had increasing lactic acid though reassuring procalcitonin. It is possible that previous broad spectrum antibiotics for wound were treating occult infection and this was 'uncovered' with deescalation of antibiotics. Could consider additional imaging to rule this out. Would agree with current broad spectrum antibiotics coverage pending blood culture and further work-up.       Cornelio Danielle MD  ID Staff  [phone]  ________________________________________________________________    Consult Question: fevers and leukopenia  Admission Diagnosis: Sepsis  Sepsis (H)  Wound of left upper extremity  Wound of left upper extremity         History of Present Illness:   Amelia Michel is a 56 year old woman with hx of VSD repair (1969), Rheumatoid arthritis, and a recent diagnosis of Afib/Aflutter/SVT, who presented to [facility] on 5/29 after Out of Hospital cardiac arrest.  After ROSC in the field, she was admitted from 5/29-6/15, extubated 6/4. While at ARU (6/15-6/19), pt developed worsening LUE wound pain and fever, and was admitted back to [facility] on 6/19/17 for further workup.       The patient first developed the LUE wound in her previous admission on 5/29. It is unclear as to what incited the wound but is thought to be secondary to IV extravasation. On chart review, it seems that she initially developed blisters and sloughed off epidermis around multiple old IV site bruises and edema up to the shoulder. 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 No DVTs. Vascular surgery consult at that time showed no indication for surgical intervention.  She received Vancomycin (5/29-5/31), meropenem (5/29-6/3) and ertapenem(6/3-6/4) during that hospitalization for Aspiration PNA and ESBL UTI.      Following her discharge, the patient experienced worsening of the wound pain and had fevers which prompted  admission. Wound culture on 6/19/17 grew Enterobacter cloacae complex, Enterococcus faecalis and coagulase negative staphylococcus. On repeat examination, the wound does show signs of inflammation c/w cellulitis though unclear if it is chemical or infectious. The wound has been receding as per the patient since this admission, suggesting that either more effective wound cares or antibiotics or both may be improving the wound. patient was switched to bactrim to complete 14-day course (7/2).    Patient became febrile with leucopenia and thrombocytopenia.    Recent culture results include:  Culture Micro   Date Value Ref Range Status   07/01/2017 Pending  Incomplete   07/01/2017 Pending  Incomplete   06/26/2017 No growth  Final   06/26/2017 No growth after 5 days  Final   06/26/2017 No growth after 5 days  Final   06/19/2017   Final    50,000 to 100,000 colonies/mL mixed urogenital harshal Susceptibility testing not   routinely done     06/19/2017 (A)  Final    Heavy growth Enterobacter cloacae complex  Moderate growth Enterococcus faecalis  Moderate growth Coagulase negative Staphylococcus Susceptibility testing not   routinely done     06/19/2017 No anaerobes isolated  Final   06/19/2017 No growth  Final   06/19/2017 No growth  Final              Review of Systems:   CONSTITUTIONAL: fevers and chills  EYES: negative for icterus  ENT:  negative for hearing loss, tinnitus and sore throat  RESPIRATORY:  negative for cough with sputum and dyspnea  CARDIOVASCULAR:  negative for chest pain, dyspnea  GASTROINTESTINAL:  Abdominal pain, negative for nausea, vomiting, diarrhea and constipation  GENITOURINARY:  negative for dysuria  HEME:  No easy bruising  INTEGUMENT:  negative for rash and pruritus  NEURO:  Negative for headache           Past Medical History:     Past Medical History:   Diagnosis Date     Hypertension      Rheumatoid arthritis(714.0)             Past Surgical History:     Past Surgical History:   Procedure  Laterality Date     ANESTHESIA CARDIOVERSION N/A 5/17/2017    Procedure: ANESTHESIA CARDIOVERSION;  ANESTHESIA CARDIOVERSION;  Surgeon: GENERIC ANESTHESIA PROVIDER;  Location: [facility] OR     ARTHRODESIS WRIST  2000    Right wrist     FOOT SURGERY      4 left and 2 right     RELEASE CARPAL TUNNEL BILATERAL       REPAIR VENTRICULAR SEPTAL DEFECT  1969            Family History:     Family History   Problem Relation Age of Onset     Breast Cancer Mother      Hypertension Mother      Alzheimer Disease Mother      Hypertension Father      Blood Disease Father      Lymphoa     Circulatory Father      A Fib     DIABETES Paternal Grandmother             Social History:     Social History   Substance Use Topics     Smoking status: Never Smoker     Smokeless tobacco: Never Used     Alcohol use Yes      Comment: Glass of wine two evenings a night     History   Sexual Activity     Sexual activity: Not on file            Current Medications (antimicrobials listed in bold):       [START ON 7/2/2017] vancomycin (VANCOCIN) IV  1,250 mg Intravenous Q12H     meropenem  1 g Intravenous Q8H     miconazole   Topical BID     predniSONE  7.5 mg Oral Daily     apixaban ANTICOAGULANT  2.5 mg Oral BID     midodrine  10 mg Oral TID w/meals     calcium carbonate  500 mg Oral Daily     pantoprazole  40 mg Oral QAM     digoxin  125 mcg Oral Daily     sodium chloride (PF)  10 mL Intracatheter Q8H     heparin lock flush  5-10 mL Intracatheter Q24H     atenolol  50 mg Oral Daily     folic acid  1 mg Oral Daily     gabapentin  200 mg Oral TID     morphine 0.1% in solosite  2 g Transdermal TID     multivitamin, therapeutic with minerals  1 tablet Oral Daily     triamcinolone   Topical BID     sodium chloride (PF)  3 mL Intracatheter Q8H     insulin aspart  1-7 Units Subcutaneous TID AC     insulin aspart  1-5 Units Subcutaneous At Bedtime     calcium-vitamin D  2 tablet Oral Daily     co-enzyme Q-10  30 mg Oral Daily     melatonin  1 mg Oral At Bedtime             Allergies:     Allergies   Allergen Reactions     Penicillins      Tape [Adhesive Tape] Rash            Physical Exam:   Vitals were reviewed  Patient Vitals for the past 24 hrs:   BP Temp Temp src Heart Rate Resp SpO2 Weight   07/01/17 1532 93/65 100.9  F (38.3  C) Axillary 92 16 99 % -   07/01/17 1318 - 101.8  F (38.8  C) Axillary - - - -   07/01/17 1221 116/79 100.8  F (38.2  C) - - - - -   07/01/17 1045 110/79 98.9  F (37.2  C) Oral 127 - 99 % -   07/01/17 0847 - - - 122 - - -   07/01/17 0846 - - - 122 - - -   07/01/17 0814 109/67 99.1  F (37.3  C) Oral - 18 98 % -   07/01/17 0625 - - - - - - 85 kg (187 lb 8 oz)   07/01/17 0305 90/64 - - 94 18 - -   07/01/17 0015 (!) 84/63 97.7  F (36.5  C) Oral - 18 97 % -   06/30/17 2205 97/66 - - - - - -   06/30/17 1942 141/76 98.2  F (36.8  C) Oral 91 20 97 % -     Ranges for his vital signs:  Temp:  [97.7  F (36.5  C)-101.8  F (38.8  C)] 100.9  F (38.3  C)  Heart Rate:  [] 92  Resp:  [16-20] 16  BP: ()/(63-79) 93/65  SpO2:  [97 %-99 %] 99 %    Physical Examination:  GENERAL:  well-developed, well-nourished, ill appearing middle age woman lying in bed in no acute distress.   HEENT:  Head is normocephalic, atraumatic   EYES:  Eyes have anicteric sclerae without conjunctival injection or stigmata of endocarditis.    ENT:  Oropharynx is moist without exudates or ulcers. Tongue is midline  NECK:  Supple. No  Cervical lymphadenopathy  LUNGS:  Tachypnea without increase work of breathing Clear to auscultation anterior bilateral.   CARDIOVASCULAR: irregular irregular tachycardia with no murmurs, gallops or rubs.  ABDOMEN:  + bowel sounds, soft, nontender. Distended No appreciable hepatosplenomegaly  SKIN:  No acute rashes.  Line(s) are in place without any surrounding erythema or exudate. No stigmata of endocarditis.  NEUROLOGIC:  Grossly nonfocal. Active x4 extremities  EXT: Extravasation wound with large area of epidermal loss, slough/necrosis in the middle  10x3 cm, no discharge         Laboratory Data:     Inflammatory Markers    Recent Labs   Lab Test  06/20/17   0709  06/19/17   1612  06/04/17   0408  06/03/17   0307  06/02/17   0342  06/01/17   0342  05/31/17   0357  05/30/17   0347  05/29/17   1055   03/20/16   1738   SED   --   63*   --    --    --    --    --   34*  22   --   29   CRP  110.0*  98.0*  23.0*  29.0*  55.0*  98.0*  170.0*  110.0*  58.0*   < >  28.4*    < > = values in this interval not displayed.       Hematology Studies  Recent Labs   Lab Test  07/01/17   0944  06/30/17 2147 06/30/17   0703  06/29/17   0554  06/28/17   1609  06/28/17   0555  06/27/17   0826   06/22/17   0653  06/21/17   0623  06/20/17   0709   06/16/17   0609   WBC  1.9*   --   2.3*  3.0*  3.0*  2.4*  2.8*   < >  2.5*  2.8*  2.8*   < >  2.3*   ANEU  1.1*   --    --    --   2.1   --    --    --   1.7  1.9  1.7   --   1.4*   AEOS  0.0   --    --    --   0.0   --    --    --   0.1  0.1  0.2   --   0.2   HGB  8.5*  8.6*  7.6*  8.9*  9.2*  6.9*  7.7*   < >  7.9*  9.5*  8.7*   < >  9.2*   MCV  90   --   92  91  92  95  94   < >  95  94  94   < >  96   PLT  20*   --   26*  27*  27*  27*  38*   < >  33*  43*  50*   < >  51*    < > = values in this interval not displayed.       Immune Globulin Studies  No lab results found.    Metabolic Studies   Recent Labs   Lab Test  07/01/17   1350  07/01/17   0910  06/30/17 2147 06/30/17   0703  06/29/17   0554  06/28/17   1754   NA   --   137  137  137  136  135   POTASSIUM  4.0  3.1*  3.1*  3.6  4.1  4.1   CHLORIDE   --   102  104  107  108  108   CO2   --   27  27  23  22  20   BUN   --   37*  39*  43*  45*  38*   CR   --   0.72  0.70  0.75  0.72  0.68   GFRESTIMATED   --   84  87  80  84  90       Hepatic Studies  Recent Labs   Lab Test  06/28/17   0555  06/27/17   0826  06/19/17   0608  06/16/17   0609  06/15/17   0556  06/05/17   0354   BILITOTAL  0.8  0.6  0.7  0.8  0.8  1.7*   ALKPHOS  126  129  180*  159*  168*  149   ALBUMIN  1.5*  1.4*   1.8*  1.8*  1.8*  1.9*   AST  41  54*  41  34  31  43   ALT  50  60*  63*  60*  73*  213*       Thyroid Studies    Recent Labs   Lab Test  05/15/17   1752   TSH  1.03       Microbiology:  Culture Micro   Date Value Ref Range Status   07/01/2017 Pending  Incomplete   07/01/2017 Pending  Incomplete   06/26/2017 No growth  Final   06/26/2017 No growth after 5 days  Final   06/26/2017 No growth after 5 days  Final   06/19/2017   Final    50,000 to 100,000 colonies/mL mixed urogenital harshal Susceptibility testing not   routinely done     06/19/2017 (A)  Final    Heavy growth Enterobacter cloacae complex  Moderate growth Enterococcus faecalis  Moderate growth Coagulase negative Staphylococcus Susceptibility testing not   routinely done     06/19/2017 No anaerobes isolated  Final   06/19/2017 No growth  Final   06/19/2017 No growth  Final   06/01/2017 No growth  Final   05/31/2017 Light growth Normal harshal  Final   05/31/2017 No growth  Final   05/30/2017 Light growth Normal harshal  Final   05/30/2017 No growth  Final   05/29/2017 No growth  Final   05/29/2017 Moderate growth Normal harshal  Final   05/29/2017   Final    Canceled, Test credited Cancelled by lab - Specimen never received.   05/29/2017 <1000 colonies/mL urogenital harshal  Final   05/21/2017 No growth  Final   05/21/2017 No growth  Final   05/21/2017 (A)  Final    >100,000 colonies/mL Escherichia coli ESBL ESBL (extended beta lactamase)   producing organisms require contact precautions.  Critical Value/Significant Value called to and read back by Agnes Molina RN [facility]   05.22.17 2054 CF         Urine Studies    Recent Labs   Lab Test  07/01/17   1123  06/26/17   1115  06/19/17   1240  05/29/17   1305  05/21/17   0800  03/20/16   1702   LEUKEST  Negative  Trace*  Negative  Negative  Large*  Negative   WBCU   --   1  2  47*  >182*  <1       Vancomycin Levels  Recent Labs   Lab Test  06/23/17   1051  06/21/17   2332   VANCOMYCIN  26.6*  23.3       Serostatus:  No  results found for: CMVG, CMIGG, CMIG, CMIM, CMVIGM, CMVM, CMLTX, HSVG1, HSVG2, HSVTP1, WI8465, HS12M, HS12GR, HS1GR, HS2GR, HERPES, HSIM, HSIG, HSIGR, HSVIGMAB, HSVG1, VARICZOSAB, EBVIGG, EBIGG, EBVAGN, TR2492  No results found for: EBIG2, EBIGM, TOXG  No lab results found.    Invalid input(s): HSV12, VZVG, DOV861    Toxoplasma Testing  No lab results found.    Invalid input(s): TOXPL, TOXABM, TOXPCR    Virology:  CMV viral loads  No lab results found.  No results found for: CMQNT, CMVQ  EBV viral loads   No lab results found.  No results found for: EBVDN, EBRES, EBVDN, EBVSP, EBVPC, EBVPCR  BK viral loads No lab results found.    Invalid input(s): BKDN, BKVPC, BKVPCR  HSV testing  No lab results found.    Hepatitis B Testing No lab results found.  Hepatitis C Testing   No results found for: HCVAB, HQTG, HCGENO, HCPCR, HQTRNA, HEPRNA  Respiratory Virus Testing    No results found for: RS, FLUAG

## 2017-07-01 NOTE — PROGRESS NOTES
"Patient A&O X 4. VSS, Temperature has been rising throughout the day, last one 101.8, team aware. Lactic acid was 2.3 this AM, TF and lasix stopped per cards 1, one time dose of vancomycin given and merrem Q8hr started as well. Placed on enteric precaution r/t diarrhea and loose stools. Magnesium and potassium replaced per protocol, rechecks WNL. Pt complained of feeling \"fever-redd\" this afternoon, lactic acid drawn STAT, came back 3.1. Team notified, placed bolus order, will start after abx is done running. Huynh continues to be in place, continues to have excellent UOP. Loose stools have slowed down since stopping TF, needs a stool sample collected for testing. Orders to draw lactic acid Q4hr, next to be drawn at 1800. Will continue to monitor and notify team of any questions or concerns.   "

## 2017-07-01 NOTE — PLAN OF CARE
Problem: Goal Outcome Summary  Goal: Goal Outcome Summary  Edema 6C: Pt with 2+ pitting in B LEs, legs quickly refilling without compression. GCBs reapplied to knee creases only as pt with frequent loose stools. Will plan for pt to wear x 48 hours. OK for pt to wear compression bandages until therapy returns. However, please remove any compression if it causes numbness, tingling, pain, becomes soiled, or if pt becomes unable to verbalize pain. Will follow up with patient.

## 2017-07-01 NOTE — PROGRESS NOTES
Cardiology Daily Note   Date of Service: 6/30/2017  Patient: Amelia Michel  MRN: [id number]  Admission Date: 6/19/2017  Hospital Day # 12    Assessment & Plan:   Amelia Michel is a 56 year old female with PMHx of VSD s/p repair (1969), RA, recently diagnosed Atrial fibrillation that was complicated with an OOH cardiac arrest (felt 2/2 long pause with degeneration to VFib while converting Afib to SR with multiple AV prieto agents) who was readmitted from ARU on 6/19 with fever and concern for worsening LUE infection. She is now transferred back to cardiology service as primary on 6/26 for further workup and evaluation of recurrent lactic acidosis, persistent afib and softer blood pressures with tachypnea.         Changes Today:  - d/c diuresis 2/2 sepsis  - gentle fluid bolus (500 mL NS)  - d/c bactrim  - Added vancomycin, meropenem  - CT scan of left arm wound, abdomen ordered     # Volume overload/anasarca:  # HFrEF (EF 50-55%):  # Hypotension:  Patient initially up 35# at admission; diuresis initially challenging, secondary to third spacing of fluid (albumin 1.9), but briskly responded with addition of midodrine and ongoing albumin resuscitation. Right heart cath 6/26 with mildly elevated right and left sided filling presures  - D/C furosemide 2/2 sepsis  - 500 mL NS bolus given   - Cardiology heart failure consult, appreciate recs    # Atrial fibrillation/flutter with RVR:  RVR in setting of volume overload above.  - Continue dig 125mcg  - Continue atenolol 50mg daily  - Continue apixaban     # Out of hospital arrest:  - Pt will need to wear LifeVest until medically appropriate and cleared from infection/wound standpoint for ICD implantation likely 4-6 weeks after discharge per EP (pending hospital course for LUE wound)    # Sepsis, likely secondary to LUE wound cellulitis  Inciting wound secondary to possible IV extravasation during arrest with surrounding erythema consistent with cellulitis. Wound  culture on 6/19 grew Enterobacter cloacae complex, Enterococcus faecalis and coagulase negative staphylococcus. Broadened to vancomycin and meropenem given new fevers and worsening surrounding skin changes. See note dated from 6/30 for further details regarding prior ID and surgery recommendations.   - Vancomycin  - Meropenem  - Bcx from today pending  - CXR without focal consolidation  - Pain control: Tramadol 25mg q6h prn, Continue morphine gel around edges of wound for pain   - WOCN      # Acute on Chronic Thrombocytopenia:   2/2 hyporoliferative bone marrow. Plts noted to decrease from 104 to as low as 15 during recent hospitalization requiring transfusion w/ chronically low in the 's as outpatient. Possible bactrim is offending agent for exacerbation, and discontinued this medication in the setting of worsening cellulitis.   -- If this is chronic ITP, no need for treatment unless plts drift < 30s  -- Continue to hold RA meds for now  -- Apixaban 2.5 mg BID (held 6/26-6/27, resumed 6/28). This will need to be held 2 days prior to ICD placement after discharge (this will be deferred given acute infection).    # Acute on chronic anemia: Stable  No evidence of overt bleeding (stool light brown). T Bili normal.  - Last transfusion 6/30   - AM CBC  - Resumed apixaban    # RA:   History of seropositive rheumatoid arthritis (anti-CCP positive) since late 1980;s w/ multiple MTP osteotomies, partial right wrist fusion, longstanding therapy consisting of MTX, predisone and HCQ  -- Continue with Prednisone taper. Now on 10 mg daily, taper to 7.5 mg on 7/1, then to 5 mg daily after 2 weeks if continues to have low disease activity  -- Continue to hold HCQ and MTX until outpatient follow-up  -- Follow-up with [facility] Dr. Conor Cunha in 4-6 weeks after discharge      # Severe Critical Illness Myopathy:   Significant deconditioning w/ median neuropathies most likely localized to the wrists and ulnar  "neuropathies most likely localized to the elbows secondary to RA.  -- Ongoing extensive PT/OT/SLP      # Malnutrition:   -- HOLDING tube feeds today (7/1/17) given ongoing loose stools  -- Nutrition recommendations are to continue Peptamen 1.5 Tube feeds to 40 ml/hr x 11 hours which will provide 440 ml TF, 660 kcals, and 30 g protein daily. TFs may be adjusted pending oral intake. Once kcal counts reveal that patient is consuming at least 900 kcals and 45 g protein daily on average can discontinue.  -- Water flushes as needed.  -- Nutrition on board.     Consulting Services: Nutrition, ID, heart failure service    CODE: Full Code  DVT Ppx: Apixaban resumed 6/28  Diet/Fluids: As above.  Disposition: Pending improvement in above.    Pt's care was discussed with bedside RN and patient during Care Team Rounds.    Patient was discussed and evaluated with Dr. Trent.    Kelly Smith  Internal Medicine PGY2  [phone]    ___________________________________________________________________    Subjective & Interval History:     Last 24 hr care team notes reviewed. No acute events overnight. Patient and family note that she has been having chills this morning, felling warm. Denies worsening cough, dyspnea. No dysuria, hematuria. Per prior pictures possibly worsening redness around left arm wound, but no worsening pain. Loose stools this morning.   Medications: Reviewed in EPIC. List below for reference    Physical Exam:    Blood pressure 93/65, pulse 95, temperature 100.9  F (38.3  C), temperature source Axillary, resp. rate 16, height 1.48 m (4' 10.25\"), weight 85 kg (187 lb 8 oz), SpO2 99 %, not currently breastfeeding.    CONSTITUTIONAL:  Ms. Michel is supine up in bed in no apparent distress.  HEENT:  NC/AT.  OP Clear.  MMM. PERRLA.  EOMI.   LUNGS: Normal respiratory effort without increased work of breathing. Lungs clear in anterior fields, mild crackles in bases. No wheezes appreciated.  CARDIOVASCULAR: Irregularly " irregular, tachycardic in 110s w/ no M/R/G.   ABDOMEN: Soft, distended (edematous abdominal wall), NT, BS +ve.   MUSCULOSKELETAL:  Moves all four extremities without difficulty. Diffuse anasarca.  NEURO: A&Ox3, CN II-XII grossly intact, non-focal.     Lines/Tubes: Rectal tube, brand catheter  PICC Triple Lumen 06/26/17 Right Basilic ok to use. (Active)   Site Assessment WDL 6/26/2017  1:29 PM   External Cath Length (cm) 3 cm 6/26/2017  1:29 PM   Extremity Circumference (cm) 35 cm 6/26/2017  1:29 PM   Dressing Intervention Chlorhexidine patch;Transparent;Securing device;New dressing 6/26/2017  1:29 PM   Dressing Change Due 07/03/17 6/26/2017  1:29 PM   PICC Lumen Assessment Trigger Red;White;Gray 6/26/2017  1:29 PM   Number of days:0       Labs & Studies of Note: Reviewed in Epic  CMP    Recent Labs  Lab 07/01/17  1350 07/01/17  0910 06/30/17  2147 06/30/17  0703 06/29/17  0554  06/28/17  0555 06/27/17  0826 06/26/17  0647 06/25/17  0710 06/24/17  2221   NA  --  137 137 137 136  < > 138 138 134 133 132*   POTASSIUM 4.0 3.1* 3.1* 3.6 4.1  < > 4.2 4.7 4.8 4.6 4.9   CHLORIDE  --  102 104 107 108  < > 109 110* 106 106 106   CO2  --  27 27 23 22  < > 23 21 17* 18* 18*   ANIONGAP  --  8 7 8 6  < > 6 7 10 10 8   GLC  --  166* 171* 143* 162*  < > 165* 184* 166* 143* 151*   BUN  --  37* 39* 43* 45*  < > 43* 39* 42* 47* 47*   CR  --  0.72 0.70 0.75 0.72  < > 0.65 0.65 0.72 0.85 0.85   GFRESTIMATED  --  84 87 80 84  < > >90Non  GFR Calc >90Non  GFR Calc 84 69 69   GFRESTBLACK  --  >90African American GFR Calc >90African American GFR Calc >90African American GFR Calc >90African American GFR Calc  < > >90African American GFR Calc >90African American GFR Calc >90African American GFR Calc 83 84   VIKTORIYA  --  8.0* 7.4* 7.4* 7.2*  < > 7.4* 7.3* 7.5* 7.3* 7.1*   MAG 2.3 1.4*  --   --   --   --   --   --  2.1 2.4* 1.9   PHOS  --   --   --   --   --   --   --   --   --   --  3.4   PROTTOTAL  --   --   --    --   --   --  3.9* 4.2*  --   --   --    ALBUMIN  --   --   --   --   --   --  1.5* 1.4*  --   --   --    BILITOTAL  --   --   --   --   --   --  0.8 0.6  --   --   --    ALKPHOS  --   --   --   --   --   --  126 129  --   --   --    AST  --   --   --   --   --   --  41 54*  --   --   --    ALT  --   --   --   --   --   --  50 60*  --   --   --    < > = values in this interval not displayed.    CBC    Recent Labs  Lab 07/01/17  0944 06/30/17  2147 06/30/17  0703 06/29/17  0554 06/28/17  1609   WBC 1.9*  --  2.3* 3.0* 3.0*   RBC 2.82*  --  2.38* 2.89* 2.89*   HGB 8.5* 8.6* 7.6* 8.9* 9.2*   HCT 25.4*  --  21.9* 26.3* 26.7*   PLT 20*  --  26* 27* 27*       INR    Recent Labs  Lab 06/28/17  1609   INR 1.31*       Unresulted Labs Ordered in the Past 30 Days of this Admission     Date and Time Order Name Status Description    7/1/2017 1103 Blood culture Preliminary     7/1/2017 1103 Blood culture Preliminary     6/26/2017 0935 Blood culture Preliminary     6/26/2017 0935 Blood culture Preliminary     5/29/2017 1046 Adalimumab Activity and Neutralizing Antibodies: Laboratory Miscellaneous Order In process           Medication List for Reference (delete if desired)  Current Facility-Administered Medications   Medication     [START ON 7/2/2017] vancomycin (VANCOCIN) 1,250 mg in NaCl 0.9 % 250 mL intermittent infusion     meropenem (MERREM) 1 g vial to attach to  mL bag     0.9% sodium chloride BOLUS     miconazole (MICATIN; MICRO GUARD) 2 % powder     predniSONE (DELTASONE) tablet 7.5 mg     apixaban ANTICOAGULANT (ELIQUIS) tablet 2.5 mg     midodrine (PROAMATINE) tablet 10 mg     lidocaine (LMX4) kit     calcium carbonate (TUMS) chewable tablet 500 mg     pantoprazole (PROTONIX) EC tablet 40 mg     digoxin (LANOXIN) tablet 125 mcg     ondansetron (ZOFRAN) tablet 4 mg     prochlorperazine (COMPAZINE) injection 5-10 mg     ondansetron (ZOFRAN) injection 4 mg     simethicone (MYLICON) chewable tablet 80 mg     sodium  chloride (PF) 0.9% PF flush 10-20 mL     sodium chloride (PF) 0.9% PF flush 10 mL     heparin lock flush 10 UNIT/ML injection 5-10 mL     heparin lock flush 10 UNIT/ML injection 5-10 mL     dextrose 10 % 1,000 mL infusion     atenolol (TENORMIN) tablet 50 mg     folic acid (FOLVITE) tablet 1 mg     gabapentin (NEURONTIN) capsule 200 mg     melatonin tablet 3 mg     morphine 0.1% in solosite topical gel 2 g     multivitamin, therapeutic with minerals (THERA-VIT-M) tablet 1 tablet     triamcinolone (KENALOG) 0.1 % ointment     lidocaine 1 % 1 mL     lidocaine (LMX4) kit     sodium chloride (PF) 0.9% PF flush 3 mL     sodium chloride (PF) 0.9% PF flush 3 mL     medication instruction     acetaminophen (TYLENOL) tablet 650 mg     acetaminophen (TYLENOL) Suppository 650 mg     Patient is already receiving anticoagulation with heparin, enoxaparin (LOVENOX), warfarin (COUMADIN)  or other anticoagulant medication     glucose 40 % gel 15-30 g    Or     dextrose 50 % injection 25-50 mL    Or     glucagon injection 1 mg     insulin aspart (NovoLOG) inj (RAPID ACTING)     insulin aspart (NovoLOG) inj (RAPID ACTING)     naloxone (NARCAN) injection 0.1-0.4 mg     calcium-vitamin D (CALTRATE) 600-400 MG-UNIT per tablet 2 tablet     co-enzyme Q-10 capsule 30 mg     morphine 0.1% in solosite topical gel     potassium chloride SA (K-DUR/KLOR-CON M) CR tablet 20-40 mEq     potassium chloride (KLOR-CON) Packet 20-40 mEq     potassium chloride 10 mEq in 100 mL intermittent infusion     potassium chloride 10 mEq in 100 mL intermittent infusion with 10 mg lidocaine     potassium chloride 20 mEq in 50 mL intermittent infusion     magnesium sulfate 2 g in NS intermittent infusion (PharMEDium or FV Cmpd)     magnesium sulfate 4 g in 100 mL sterile water (premade)     sodium phosphate 10 mmol in D5W intermittent infusion     sodium phosphate 15 mmol in D5W intermittent infusion     sodium phosphate 20 mmol in D5W intermittent infusion      sodium phosphate 25 mmol in D5W intermittent infusion     melatonin tablet 1 mg     traMADol (ULTRAM) half-tab 25 mg       I very much appreciated the opportunity to see and assess Mrs Amelia Michel in the hospital with CV Fellow Dr Burnette and resident Dr Smith.  Mrs Michel's diuresed very well over last 24 hours, but is now c/o chills and she appears flushed. CXR is improved. Urine will be checked but broad spectrum ABx will be started, Feeding tube will be held given diarrhea.  Discussed with patient and spouse and they approve.    I agree with the note above which summarizes my findings and current recommendations.  Please do not hesitate to contact my office if you have any questions or concerns.      Pj Trent MD  Cardiac Arrhythmia Service  [facility]  [phone]

## 2017-07-02 LAB
ALBUMIN UR-MCNC: 10 MG/DL
ALBUMIN UR-MCNC: 10 MG/DL
ANION GAP SERPL CALCULATED.3IONS-SCNC: 6 MMOL/L (ref 3–14)
APPEARANCE UR: CLEAR
APPEARANCE UR: CLEAR
BACTERIA #/AREA URNS HPF: ABNORMAL /HPF
BACTERIA SPEC CULT: NO GROWTH
BACTERIA SPEC CULT: NO GROWTH
BASOPHILS # BLD AUTO: 0 10E9/L (ref 0–0.2)
BASOPHILS NFR BLD AUTO: 0.5 %
BILIRUB DIRECT SERPL-MCNC: 0.4 MG/DL (ref 0–0.2)
BILIRUB SERPL-MCNC: 0.9 MG/DL (ref 0.2–1.3)
BILIRUB UR QL STRIP: NEGATIVE
BILIRUB UR QL STRIP: NEGATIVE
BUN SERPL-MCNC: 36 MG/DL (ref 7–30)
C DIFF TOX B STL QL: NORMAL
CALCIUM SERPL-MCNC: 8.1 MG/DL (ref 8.5–10.1)
CAMPYLOBACTER GROUP BY NAT: NOT DETECTED
CHLORIDE SERPL-SCNC: 105 MMOL/L (ref 94–109)
CO2 SERPL-SCNC: 27 MMOL/L (ref 20–32)
COLOR UR AUTO: YELLOW
COLOR UR AUTO: YELLOW
CREAT SERPL-MCNC: 0.66 MG/DL (ref 0.52–1.04)
CRP SERPL-MCNC: 69 MG/L (ref 0–8)
DIFFERENTIAL METHOD BLD: ABNORMAL
ENTERIC PATHOGEN COMMENT: NORMAL
EOSINOPHIL # BLD AUTO: 0 10E9/L (ref 0–0.7)
EOSINOPHIL NFR BLD AUTO: 1.1 %
ERYTHROCYTE [DISTWIDTH] IN BLOOD BY AUTOMATED COUNT: 22.1 % (ref 10–15)
GFR SERPL CREATININE-BSD FRML MDRD: ABNORMAL ML/MIN/1.7M2
GLUCOSE BLDC GLUCOMTR-MCNC: 115 MG/DL (ref 70–99)
GLUCOSE BLDC GLUCOMTR-MCNC: 137 MG/DL (ref 70–99)
GLUCOSE BLDC GLUCOMTR-MCNC: 141 MG/DL (ref 70–99)
GLUCOSE SERPL-MCNC: 118 MG/DL (ref 70–99)
GLUCOSE UR STRIP-MCNC: NEGATIVE MG/DL
GLUCOSE UR STRIP-MCNC: NEGATIVE MG/DL
GRAN CASTS #/AREA URNS LPF: 1 /LPF
HCT VFR BLD AUTO: 26.8 % (ref 35–47)
HGB BLD-MCNC: 8.8 G/DL (ref 11.7–15.7)
HGB UR QL STRIP: ABNORMAL
HGB UR QL STRIP: NEGATIVE
HYALINE CASTS #/AREA URNS LPF: 4 /LPF (ref 0–2)
IMM GRANULOCYTES # BLD: 0 10E9/L (ref 0–0.4)
IMM GRANULOCYTES NFR BLD: 0 %
KETONES UR STRIP-MCNC: NEGATIVE MG/DL
KETONES UR STRIP-MCNC: NEGATIVE MG/DL
LACTATE BLD-SCNC: 1.4 MMOL/L (ref 0.7–2.1)
LACTATE BLD-SCNC: 1.6 MMOL/L (ref 0.7–2.1)
LACTATE BLD-SCNC: 1.7 MMOL/L (ref 0.7–2.1)
LACTATE BLD-SCNC: 2.1 MMOL/L (ref 0.7–2.1)
LACTATE BLD-SCNC: 3.2 MMOL/L (ref 0.7–2.1)
LEUKOCYTE ESTERASE UR QL STRIP: ABNORMAL
LEUKOCYTE ESTERASE UR QL STRIP: ABNORMAL
LYMPHOCYTES # BLD AUTO: 0.4 10E9/L (ref 0.8–5.3)
LYMPHOCYTES NFR BLD AUTO: 19.3 %
MCH RBC QN AUTO: 29.8 PG (ref 26.5–33)
MCHC RBC AUTO-ENTMCNC: 32.8 G/DL (ref 31.5–36.5)
MCV RBC AUTO: 91 FL (ref 78–100)
MICRO REPORT STATUS: NORMAL
MICRO REPORT STATUS: NORMAL
MONOCYTES # BLD AUTO: 0.3 10E9/L (ref 0–1.3)
MONOCYTES NFR BLD AUTO: 13.9 %
MUCOUS THREADS #/AREA URNS LPF: PRESENT /LPF
MUCOUS THREADS #/AREA URNS LPF: PRESENT /LPF
NEUTROPHILS # BLD AUTO: 1.2 10E9/L (ref 1.6–8.3)
NEUTROPHILS NFR BLD AUTO: 65.2 %
NITRATE UR QL: NEGATIVE
NITRATE UR QL: NEGATIVE
NOROVIRUS I AND II BY NAT: NOT DETECTED
NRBC # BLD AUTO: 0 10*3/UL
NRBC BLD AUTO-RTO: 0 /100
PH UR STRIP: 5.5 PH (ref 5–7)
PH UR STRIP: 6 PH (ref 5–7)
PLATELET # BLD AUTO: 30 10E9/L (ref 150–450)
POTASSIUM SERPL-SCNC: 4.2 MMOL/L (ref 3.4–5.3)
PROCALCITONIN SERPL-MCNC: 0.91 NG/ML
RBC # BLD AUTO: 2.95 10E12/L (ref 3.8–5.2)
RBC #/AREA URNS AUTO: 2 /HPF (ref 0–2)
RBC #/AREA URNS AUTO: 3 /HPF (ref 0–2)
RETICS # AUTO: 58.1 10E9/L (ref 25–95)
RETICS/RBC NFR AUTO: 2 % (ref 0.5–2)
ROTAVIRUS A BY NAT: NOT DETECTED
SALMONELLA SPECIES BY NAT: NOT DETECTED
SHIGA TOXIN 1 GENE BY NAT: NOT DETECTED
SHIGA TOXIN 2 GENE BY NAT: NOT DETECTED
SHIGELLA SP+EIEC IPAH STL QL NAA+PROBE: NOT DETECTED
SODIUM SERPL-SCNC: 138 MMOL/L (ref 133–144)
SODIUM UR-SCNC: 39 MMOL/L
SP GR UR STRIP: 1.02 (ref 1–1.03)
SP GR UR STRIP: 1.03 (ref 1–1.03)
SPECIMEN SOURCE: NORMAL
TRANS CELLS #/AREA URNS HPF: <1 /HPF (ref 0–1)
URN SPEC COLLECT METH UR: ABNORMAL
URN SPEC COLLECT METH UR: ABNORMAL
UROBILINOGEN UR STRIP-MCNC: NORMAL MG/DL (ref 0–2)
UROBILINOGEN UR STRIP-MCNC: NORMAL MG/DL (ref 0–2)
VIBRIO GROUP BY NAT: NOT DETECTED
WBC # BLD AUTO: 1.9 10E9/L (ref 4–11)
WBC #/AREA URNS AUTO: 15 /HPF (ref 0–2)
WBC #/AREA URNS AUTO: 5 /HPF (ref 0–2)
WBC CLUMPS #/AREA URNS HPF: PRESENT /HPF
YERSINIA ENTEROCOLITICA BY NAT: NOT DETECTED

## 2017-07-02 PROCEDURE — 25000132 ZZH RX MED GY IP 250 OP 250 PS 637: Performed by: STUDENT IN AN ORGANIZED HEALTH CARE EDUCATION/TRAINING PROGRAM

## 2017-07-02 PROCEDURE — 25000131 ZZH RX MED GY IP 250 OP 636 PS 637: Performed by: STUDENT IN AN ORGANIZED HEALTH CARE EDUCATION/TRAINING PROGRAM

## 2017-07-02 PROCEDURE — 83605 ASSAY OF LACTIC ACID: CPT | Performed by: STUDENT IN AN ORGANIZED HEALTH CARE EDUCATION/TRAINING PROGRAM

## 2017-07-02 PROCEDURE — 25000132 ZZH RX MED GY IP 250 OP 250 PS 637

## 2017-07-02 PROCEDURE — 25000128 H RX IP 250 OP 636: Performed by: INTERNAL MEDICINE

## 2017-07-02 PROCEDURE — 25000125 ZZHC RX 250: Performed by: INTERNAL MEDICINE

## 2017-07-02 PROCEDURE — 87506 IADNA-DNA/RNA PROBE TQ 6-11: CPT | Performed by: STUDENT IN AN ORGANIZED HEALTH CARE EDUCATION/TRAINING PROGRAM

## 2017-07-02 PROCEDURE — 00000146 ZZHCL STATISTIC GLUCOSE BY METER IP

## 2017-07-02 PROCEDURE — 87040 BLOOD CULTURE FOR BACTERIA: CPT | Performed by: INTERNAL MEDICINE

## 2017-07-02 PROCEDURE — 25000132 ZZH RX MED GY IP 250 OP 250 PS 637: Performed by: NURSE PRACTITIONER

## 2017-07-02 PROCEDURE — 82247 BILIRUBIN TOTAL: CPT | Performed by: STUDENT IN AN ORGANIZED HEALTH CARE EDUCATION/TRAINING PROGRAM

## 2017-07-02 PROCEDURE — 81001 URINALYSIS AUTO W/SCOPE: CPT | Performed by: INTERNAL MEDICINE

## 2017-07-02 PROCEDURE — 36415 COLL VENOUS BLD VENIPUNCTURE: CPT | Performed by: INTERNAL MEDICINE

## 2017-07-02 PROCEDURE — 82248 BILIRUBIN DIRECT: CPT | Performed by: STUDENT IN AN ORGANIZED HEALTH CARE EDUCATION/TRAINING PROGRAM

## 2017-07-02 PROCEDURE — 27210437 ZZH NUTRITION PRODUCT SEMIELEM INTERMED LITER

## 2017-07-02 PROCEDURE — 85045 AUTOMATED RETICULOCYTE COUNT: CPT | Performed by: INTERNAL MEDICINE

## 2017-07-02 PROCEDURE — 21400006 ZZH R&B CCU INTERMEDIATE UMMC

## 2017-07-02 PROCEDURE — 84300 ASSAY OF URINE SODIUM: CPT | Performed by: INTERNAL MEDICINE

## 2017-07-02 PROCEDURE — 87086 URINE CULTURE/COLONY COUNT: CPT | Performed by: INTERNAL MEDICINE

## 2017-07-02 PROCEDURE — 36592 COLLECT BLOOD FROM PICC: CPT | Performed by: INTERNAL MEDICINE

## 2017-07-02 PROCEDURE — 99233 SBSQ HOSP IP/OBS HIGH 50: CPT | Mod: GC | Performed by: INTERNAL MEDICINE

## 2017-07-02 PROCEDURE — 25000132 ZZH RX MED GY IP 250 OP 250 PS 637: Performed by: INTERNAL MEDICINE

## 2017-07-02 PROCEDURE — 86140 C-REACTIVE PROTEIN: CPT | Performed by: STUDENT IN AN ORGANIZED HEALTH CARE EDUCATION/TRAINING PROGRAM

## 2017-07-02 PROCEDURE — 25000128 H RX IP 250 OP 636: Performed by: STUDENT IN AN ORGANIZED HEALTH CARE EDUCATION/TRAINING PROGRAM

## 2017-07-02 PROCEDURE — 25000125 ZZHC RX 250: Performed by: STUDENT IN AN ORGANIZED HEALTH CARE EDUCATION/TRAINING PROGRAM

## 2017-07-02 PROCEDURE — 87493 C DIFF AMPLIFIED PROBE: CPT | Performed by: STUDENT IN AN ORGANIZED HEALTH CARE EDUCATION/TRAINING PROGRAM

## 2017-07-02 PROCEDURE — 40000802 ZZH SITE CHECK

## 2017-07-02 PROCEDURE — 36592 COLLECT BLOOD FROM PICC: CPT | Performed by: STUDENT IN AN ORGANIZED HEALTH CARE EDUCATION/TRAINING PROGRAM

## 2017-07-02 PROCEDURE — 84145 PROCALCITONIN (PCT): CPT | Performed by: STUDENT IN AN ORGANIZED HEALTH CARE EDUCATION/TRAINING PROGRAM

## 2017-07-02 PROCEDURE — 80048 BASIC METABOLIC PNL TOTAL CA: CPT | Performed by: STUDENT IN AN ORGANIZED HEALTH CARE EDUCATION/TRAINING PROGRAM

## 2017-07-02 PROCEDURE — P9047 ALBUMIN (HUMAN), 25%, 50ML: HCPCS | Performed by: INTERNAL MEDICINE

## 2017-07-02 PROCEDURE — 85025 COMPLETE CBC W/AUTO DIFF WBC: CPT | Performed by: INTERNAL MEDICINE

## 2017-07-02 RX ORDER — ALBUMIN (HUMAN) 12.5 G/50ML
50 SOLUTION INTRAVENOUS ONCE
Status: COMPLETED | OUTPATIENT
Start: 2017-07-02 | End: 2017-07-02

## 2017-07-02 RX ADMIN — GABAPENTIN 200 MG: 100 CAPSULE ORAL at 21:00

## 2017-07-02 RX ADMIN — PREDNISONE 7.5 MG: 5 TABLET ORAL at 09:32

## 2017-07-02 RX ADMIN — MICONAZOLE NITRATE: 2 POWDER TOPICAL at 09:30

## 2017-07-02 RX ADMIN — MEROPENEM 1 G: 1 INJECTION, POWDER, FOR SOLUTION INTRAVENOUS at 05:55

## 2017-07-02 RX ADMIN — MIDODRINE HYDROCHLORIDE 10 MG: 5 TABLET ORAL at 12:12

## 2017-07-02 RX ADMIN — MEROPENEM 1 G: 1 INJECTION, POWDER, FOR SOLUTION INTRAVENOUS at 13:44

## 2017-07-02 RX ADMIN — MICONAZOLE NITRATE: 2 POWDER TOPICAL at 21:30

## 2017-07-02 RX ADMIN — MEROPENEM 1 G: 1 INJECTION, POWDER, FOR SOLUTION INTRAVENOUS at 21:00

## 2017-07-02 RX ADMIN — POTASSIUM CHLORIDE 20 MEQ: 1.5 POWDER, FOR SOLUTION ORAL at 00:28

## 2017-07-02 RX ADMIN — DIGOXIN 125 MCG: 125 TABLET ORAL at 09:31

## 2017-07-02 RX ADMIN — GABAPENTIN 200 MG: 100 CAPSULE ORAL at 09:31

## 2017-07-02 RX ADMIN — MULTIPLE VITAMINS W/ MINERALS TAB 1 TABLET: TAB at 09:32

## 2017-07-02 RX ADMIN — GABAPENTIN 200 MG: 100 CAPSULE ORAL at 13:57

## 2017-07-02 RX ADMIN — CALCIUM CARBONATE 600 MG (1,500 MG)-VITAMIN D3 400 UNIT TABLET 2 TABLET: at 09:31

## 2017-07-02 RX ADMIN — VANCOMYCIN HYDROCHLORIDE 1250 MG: 10 INJECTION, POWDER, LYOPHILIZED, FOR SOLUTION INTRAVENOUS at 15:03

## 2017-07-02 RX ADMIN — Medication 2 G: at 10:55

## 2017-07-02 RX ADMIN — Medication 30 MG: at 09:31

## 2017-07-02 RX ADMIN — INSULIN ASPART 1 UNITS: 100 INJECTION, SOLUTION INTRAVENOUS; SUBCUTANEOUS at 18:31

## 2017-07-02 RX ADMIN — MIDODRINE HYDROCHLORIDE 10 MG: 5 TABLET ORAL at 09:30

## 2017-07-02 RX ADMIN — ALBUMIN (HUMAN) 50 G: 12.5 SOLUTION INTRAVENOUS at 15:16

## 2017-07-02 RX ADMIN — APIXABAN 2.5 MG: 2.5 TABLET, FILM COATED ORAL at 09:33

## 2017-07-02 RX ADMIN — APIXABAN 2.5 MG: 2.5 TABLET, FILM COATED ORAL at 21:00

## 2017-07-02 RX ADMIN — SODIUM CHLORIDE, PRESERVATIVE FREE 5 ML: 5 INJECTION INTRAVENOUS at 10:24

## 2017-07-02 RX ADMIN — SODIUM CHLORIDE, PRESERVATIVE FREE 5 ML: 5 INJECTION INTRAVENOUS at 01:57

## 2017-07-02 RX ADMIN — SODIUM CHLORIDE, PRESERVATIVE FREE 5 ML: 5 INJECTION INTRAVENOUS at 16:15

## 2017-07-02 RX ADMIN — MIDODRINE HYDROCHLORIDE 10 MG: 5 TABLET ORAL at 18:31

## 2017-07-02 RX ADMIN — FOLIC ACID 1 MG: 1 TABLET ORAL at 09:33

## 2017-07-02 RX ADMIN — VANCOMYCIN HYDROCHLORIDE 1250 MG: 10 INJECTION, POWDER, LYOPHILIZED, FOR SOLUTION INTRAVENOUS at 00:17

## 2017-07-02 RX ADMIN — ACETAMINOPHEN 650 MG: 325 TABLET, FILM COATED ORAL at 12:12

## 2017-07-02 RX ADMIN — ATENOLOL 50 MG: 50 TABLET ORAL at 09:32

## 2017-07-02 RX ADMIN — PANTOPRAZOLE SODIUM 40 MG: 40 TABLET, DELAYED RELEASE ORAL at 05:55

## 2017-07-02 NOTE — PROGRESS NOTES
Cardiology Daily Note   Date of Service: 6/30/2017  Patient: Amelia Michel  MRN: [id number]  Admission Date: 6/19/2017  Hospital Day # 13    Assessment & Plan:   Amelai Michel is a 56 year old female with PMHx of VSD s/p repair (1969), RA, recently diagnosed Atrial fibrillation that was complicated with an OOH cardiac arrest (felt 2/2 long pause with degeneration to VFib while converting Afib to SR with multiple AV prieto agents) who was readmitted from ARU on 6/19 with fever and concern for worsening LUE infection. She is now transferred back to cardiology service as primary on 6/26 for further workup and evaluation of recurrent lactic acidosis, persistent afib and softer blood pressures with tachypnea.         Changes Today:  - continue to hold diuresis 2/2 sepsis  - albumin for resuscitation  - continue vancomycin, meropenem     # Volume overload/anasarca:  # HFrEF (EF 50-55%):  # Hypotension:  Patient initially up 35# at admission; diuresis initially challenging, secondary to third spacing of fluid (albumin 1.9), but briskly responded with addition of midodrine and ongoing albumin resuscitation. Right heart cath 6/26 with mildly elevated right and left sided filling presures  - Hold on furosemide 2/2 sepsis and auto diuresis  - 50 gm 25% albumin given   - Cardiology heart failure consult, appreciate recs    # Atrial fibrillation/flutter with RVR:  RVR in setting of volume overload above.  - Continue dig 125mcg  - Continue atenolol 50mg daily  - Continue apixaban     # Out of hospital arrest:  Pt will need to wear LifeVest until medically appropriate and cleared from infection/wound standpoint for ICD implantation likely 4-6 weeks after discharge per EP (pending hospital course for LUE wound)    # Sepsis, likely secondary to LUE wound cellulitis  Inciting wound secondary to possible IV extravasation during arrest with surrounding erythema consistent with cellulitis. Wound culture on 6/19 grew  Enterobacter cloacae complex, Enterococcus faecalis and coagulase negative staphylococcus. Broadened to vancomycin and meropenem given new fevers and worsening surrounding skin changes. See note dated from 6/30 for further details regarding prior ID and surgery recommendations.   - Vancomycin  - Meropenem  - Pain control: Tramadol 25mg q6h prn, Continue morphine gel around edges of wound for pain   - WOCN      # Acute on Chronic Thrombocytopenia:   2/2 hypoproliferative bone marrow. Plts noted to decrease from 104 to as low as 15 during recent hospitalization requiring transfusion w/ chronically low in the 's as outpatient. Possible bactrim is offending agent for exacerbation, and discontinued this medication in the setting of worsening cellulitis.   -- If this is chronic ITP, no need for treatment unless plts drift < 30s  -- Continue to hold RA meds for now  -- Apixaban 2.5 mg BID (held 6/26-6/27, resumed 6/28). This will need to be held 2 days prior to ICD placement after discharge (this will be deferred given acute infection).    # Acute on chronic anemia: Stable  No evidence of overt bleeding (stool light brown). T Bili normal.  - Last transfusion 6/30   - AM CBC  - Resumed apixaban    # RA:   History of seropositive rheumatoid arthritis (anti-CCP positive) since late 1980;s w/ multiple MTP osteotomies, partial right wrist fusion, longstanding therapy consisting of MTX, prednisone and HCQ  -- Continue with Prednisone taper. Now on 10 mg daily, taper to 7.5 mg on 7/1, then to 5 mg daily after 2 weeks if continues to have low disease activity  -- Continue to hold HCQ and MTX until outpatient follow-up  -- Follow-up with [facility] Dr. Conor Cunha in 4-6 weeks after discharge      # Severe Critical Illness Myopathy:   Significant deconditioning w/ median neuropathies most likely localized to the wrists and ulnar neuropathies most likely localized to the elbows secondary to RA.  -- Ongoing  "extensive PT/OT/SLP      # Malnutrition:   -- HOLDING tube feeds given ongoing loose stools, encourage PO intake  -- Nutrition recommendations are to continue Peptamen 1.5 Tube feeds to 40 ml/hr x 11 hours which will provide 440 ml TF, 660 kcals, and 30 g protein daily. TFs may be adjusted pending oral intake. Once kcal counts reveal that patient is consuming at least 900 kcals and 45 g protein daily on average can discontinue.  -- Water flushes as needed.  -- Nutrition on board.     Consulting Services: Nutrition, ID, heart failure service    CODE: Full Code  DVT Ppx: Apixaban resumed 6/28  Diet/Fluids: As above.  Disposition: Pending improvement in above.    Pt's care was discussed with bedside RN and patient during Care Team Rounds.    Patient was discussed and evaluated with Dr. Trent.    Kelly Smith  Internal Medicine PGY2  [phone]    ___________________________________________________________________    Subjective & Interval History:     Last 24 hr care team notes reviewed. No acute events overnight. Patient again having chills, fever this morning. No difficulty breathing, cough. No diarrhea. No abdominal pain. No change in pain around left arm. No sore throat/difficulty swallowing.   Medications: Reviewed in EPIC. List below for reference    Physical Exam:    Blood pressure 107/68, pulse 123, temperature 100  F (37.8  C), temperature source Axillary, resp. rate 20, height 1.48 m (4' 10.25\"), weight 83.5 kg (184 lb), SpO2 98 %, not currently breastfeeding.    CONSTITUTIONAL:  Ms. Michel is supine up in bed in no apparent distress.  HEENT: OP Clear.  MMM. PERRLA.  EOMI.   LUNGS: Normal respiratory effort without increased work of breathing. Lungs clear in anterior fields, mild crackles in bases. No wheezes appreciated.  CARDIOVASCULAR: Irregularly irregular, tachycardic w/ no M/R/G.   ABDOMEN: Soft, distended (edematous abdominal wall), NT, BS +ve.   MUSCULOSKELETAL:  Moves all four extremities without " difficulty. Diffuse anasarca.  NEURO: A&Ox3, CN II-XII grossly intact, non-focal.   Skin: Wound in LUE without significant surround erythema. + surrounding edema     Lines/Tubes: Rectal tube, brand catheter  PICC Triple Lumen 06/26/17 Right Basilic ok to use. (Active)   Site Assessment WDL 6/26/2017  1:29 PM   External Cath Length (cm) 3 cm 6/26/2017  1:29 PM   Extremity Circumference (cm) 35 cm 6/26/2017  1:29 PM   Dressing Intervention Chlorhexidine patch;Transparent;Securing device;New dressing 6/26/2017  1:29 PM   Dressing Change Due 07/03/17 6/26/2017  1:29 PM   PICC Lumen Assessment Trigger Red;White;Gray 6/26/2017  1:29 PM   Number of days:0       Labs & Studies of Note: Reviewed in Epic  CMP    Recent Labs  Lab 07/02/17  0641 07/01/17  1350 07/01/17  0910 06/30/17  2147 06/30/17  0703  06/28/17  0555 06/27/17  0826 06/26/17  0647     --  137 137 137  < > 138 138 134   POTASSIUM 4.2 4.0 3.1* 3.1* 3.6  < > 4.2 4.7 4.8   CHLORIDE 105  --  102 104 107  < > 109 110* 106   CO2 27  --  27 27 23  < > 23 21 17*   ANIONGAP 6  --  8 7 8  < > 6 7 10   *  --  166* 171* 143*  < > 165* 184* 166*   BUN 36*  --  37* 39* 43*  < > 43* 39* 42*   CR 0.66  --  0.72 0.70 0.75  < > 0.65 0.65 0.72   GFRESTIMATED >90Non  GFR Calc  --  84 87 80  < > >90Non  GFR Calc >90Non  GFR Calc 84   GFRESTBLACK >90African American GFR Calc  --  >90African American GFR Calc >90African American GFR Calc >90African American GFR Calc  < > >90African American GFR Calc >90African American GFR Calc >90African American GFR Calc   VIKTORIYA 8.1*  --  8.0* 7.4* 7.4*  < > 7.4* 7.3* 7.5*   MAG  --  2.3 1.4*  --   --   --   --   --  2.1   PROTTOTAL  --   --   --   --   --   --  3.9* 4.2*  --    ALBUMIN  --   --   --   --   --   --  1.5* 1.4*  --    BILITOTAL 0.9  --   --   --   --   --  0.8 0.6  --    ALKPHOS  --   --   --   --   --   --  126 129  --    AST  --   --   --   --   --   --  41 54*  --    ALT   --   --   --   --   --   --  50 60*  --    < > = values in this interval not displayed.    CBC    Recent Labs  Lab 07/02/17  1029 07/01/17  0944 06/30/17  2147 06/30/17  0703 06/29/17  0554   WBC 1.9* 1.9*  --  2.3* 3.0*   RBC 2.95* 2.82*  --  2.38* 2.89*   HGB 8.8* 8.5* 8.6* 7.6* 8.9*   HCT 26.8* 25.4*  --  21.9* 26.3*   PLT 30* 20*  --  26* 27*       INR    Recent Labs  Lab 06/28/17  1609   INR 1.31*       Unresulted Labs Ordered in the Past 30 Days of this Admission     Date and Time Order Name Status Description    7/2/2017 1224 Blood culture In process     7/2/2017 1224 Blood culture In process     7/2/2017 1200 Lactic acid whole blood In process     7/1/2017 1103 Blood culture Preliminary     7/1/2017 1103 Blood culture Preliminary     5/29/2017 1046 Adalimumab Activity and Neutralizing Antibodies: Laboratory Miscellaneous Order In process           Medication List for Reference (delete if desired)  Current Facility-Administered Medications   Medication     albumin human 25 % injection 50 g     vancomycin (VANCOCIN) 1,250 mg in NaCl 0.9 % 250 mL intermittent infusion     meropenem (MERREM) 1 g vial to attach to  mL bag     miconazole (MICATIN; MICRO GUARD) 2 % powder     predniSONE (DELTASONE) tablet 7.5 mg     apixaban ANTICOAGULANT (ELIQUIS) tablet 2.5 mg     midodrine (PROAMATINE) tablet 10 mg     lidocaine (LMX4) kit     calcium carbonate (TUMS) chewable tablet 500 mg     pantoprazole (PROTONIX) EC tablet 40 mg     digoxin (LANOXIN) tablet 125 mcg     ondansetron (ZOFRAN) tablet 4 mg     prochlorperazine (COMPAZINE) injection 5-10 mg     ondansetron (ZOFRAN) injection 4 mg     simethicone (MYLICON) chewable tablet 80 mg     sodium chloride (PF) 0.9% PF flush 10-20 mL     sodium chloride (PF) 0.9% PF flush 10 mL     heparin lock flush 10 UNIT/ML injection 5-10 mL     heparin lock flush 10 UNIT/ML injection 5-10 mL     dextrose 10 % 1,000 mL infusion     atenolol (TENORMIN) tablet 50 mg     folic acid  (FOLVITE) tablet 1 mg     gabapentin (NEURONTIN) capsule 200 mg     melatonin tablet 3 mg     morphine 0.1% in solosite topical gel 2 g     multivitamin, therapeutic with minerals (THERA-VIT-M) tablet 1 tablet     triamcinolone (KENALOG) 0.1 % ointment     lidocaine 1 % 1 mL     lidocaine (LMX4) kit     sodium chloride (PF) 0.9% PF flush 3 mL     sodium chloride (PF) 0.9% PF flush 3 mL     medication instruction     acetaminophen (TYLENOL) tablet 650 mg     acetaminophen (TYLENOL) Suppository 650 mg     Patient is already receiving anticoagulation with heparin, enoxaparin (LOVENOX), warfarin (COUMADIN)  or other anticoagulant medication     glucose 40 % gel 15-30 g    Or     dextrose 50 % injection 25-50 mL    Or     glucagon injection 1 mg     insulin aspart (NovoLOG) inj (RAPID ACTING)     insulin aspart (NovoLOG) inj (RAPID ACTING)     naloxone (NARCAN) injection 0.1-0.4 mg     calcium-vitamin D (CALTRATE) 600-400 MG-UNIT per tablet 2 tablet     co-enzyme Q-10 capsule 30 mg     morphine 0.1% in solosite topical gel     potassium chloride SA (K-DUR/KLOR-CON M) CR tablet 20-40 mEq     potassium chloride (KLOR-CON) Packet 20-40 mEq     potassium chloride 10 mEq in 100 mL intermittent infusion     potassium chloride 10 mEq in 100 mL intermittent infusion with 10 mg lidocaine     potassium chloride 20 mEq in 50 mL intermittent infusion     magnesium sulfate 2 g in NS intermittent infusion (PharMEDium or FV Cmpd)     magnesium sulfate 4 g in 100 mL sterile water (premade)     sodium phosphate 10 mmol in D5W intermittent infusion     sodium phosphate 15 mmol in D5W intermittent infusion     sodium phosphate 20 mmol in D5W intermittent infusion     sodium phosphate 25 mmol in D5W intermittent infusion     melatonin tablet 1 mg     traMADol (ULTRAM) half-tab 25 mg       I very much appreciated the opportunity to see and assess Mrs Amelia Michel in the hospital with CV Fellow and resident. Today I spent 20 min with  patient and spouse. She is diuresing much better, but infection concern remains. Her arm site does not seem to be the cause.      I agree with the note above which summarizes my findings and current recommendations.  Please do not hesitate to contact my office if you have any questions or concerns.      Pj Trent MD  Cardiac Arrhythmia Service  [facility]  [phone]

## 2017-07-02 NOTE — PLAN OF CARE
Problem: Goal Outcome Summary  Goal: Goal Outcome Summary  Outcome: No Change  Pt A/Ox4, VSS on 2L O2 overnight (desats to 85-90% while sleeping). Pt denies pain. Pt up with assist 1-2. Huynh in place with good output. Stool sample sent, negative for Cdiff. Tube feeds being held due to loose stools. K replaced, recheck in AM. ABX given. Pt's LA at 0200 was 1.4 (previous 1.9). Continue to monitor and notify MD of questions or concerns.

## 2017-07-02 NOTE — PROVIDER NOTIFICATION
D/I/A: Pt platelet count 20 today; MDs notified @[id number] to clarify and update on pt status.  Lactic acid level decreased from 3.1 previously to 1.7.   VSS.  Febrile.  A-fib/flutter on monitor.  A+O x4 and able to make needs known.  Denies pain or sob.  P: Continue to monitor status.

## 2017-07-02 NOTE — PLAN OF CARE
Problem: Goal Outcome Summary  Goal: Goal Outcome Summary  PT / 6C - Cancel. Attempted x2 instances this date. RN stating pt not appropriate for PT for both attempts, and requesting PT reschedule for 7/3.

## 2017-07-03 ENCOUNTER — APPOINTMENT (OUTPATIENT)
Dept: OCCUPATIONAL THERAPY | Facility: CLINIC | Age: 57
DRG: 871 | End: 2017-07-03
Attending: INTERNAL MEDICINE
Payer: COMMERCIAL

## 2017-07-03 ENCOUNTER — APPOINTMENT (OUTPATIENT)
Dept: SPEECH THERAPY | Facility: CLINIC | Age: 57
DRG: 871 | End: 2017-07-03
Attending: INTERNAL MEDICINE
Payer: COMMERCIAL

## 2017-07-03 ENCOUNTER — APPOINTMENT (OUTPATIENT)
Dept: PHYSICAL THERAPY | Facility: CLINIC | Age: 57
DRG: 871 | End: 2017-07-03
Attending: INTERNAL MEDICINE
Payer: COMMERCIAL

## 2017-07-03 LAB
ANION GAP SERPL CALCULATED.3IONS-SCNC: 6 MMOL/L (ref 3–14)
ANION GAP SERPL CALCULATED.3IONS-SCNC: 6 MMOL/L (ref 3–14)
BACTERIA SPEC CULT: NO GROWTH
BASOPHILS # BLD AUTO: 0 10E9/L (ref 0–0.2)
BASOPHILS NFR BLD AUTO: 1.1 %
BUN SERPL-MCNC: 30 MG/DL (ref 7–30)
BUN SERPL-MCNC: 34 MG/DL (ref 7–30)
C DIFF TOX B STL QL: NORMAL
CALCIUM SERPL-MCNC: 8 MG/DL (ref 8.5–10.1)
CALCIUM SERPL-MCNC: 8.2 MG/DL (ref 8.5–10.1)
CHLORIDE SERPL-SCNC: 102 MMOL/L (ref 94–109)
CHLORIDE SERPL-SCNC: 107 MMOL/L (ref 94–109)
CO2 SERPL-SCNC: 27 MMOL/L (ref 20–32)
CO2 SERPL-SCNC: 28 MMOL/L (ref 20–32)
CREAT SERPL-MCNC: 0.5 MG/DL (ref 0.52–1.04)
CREAT SERPL-MCNC: 0.55 MG/DL (ref 0.52–1.04)
DIFFERENTIAL METHOD BLD: ABNORMAL
EOSINOPHIL # BLD AUTO: 0.1 10E9/L (ref 0–0.7)
EOSINOPHIL NFR BLD AUTO: 4 %
ERYTHROCYTE [DISTWIDTH] IN BLOOD BY AUTOMATED COUNT: 22.5 % (ref 10–15)
GFR SERPL CREATININE-BSD FRML MDRD: ABNORMAL ML/MIN/1.7M2
GFR SERPL CREATININE-BSD FRML MDRD: ABNORMAL ML/MIN/1.7M2
GLUCOSE BLDC GLUCOMTR-MCNC: 97 MG/DL (ref 70–99)
GLUCOSE SERPL-MCNC: 106 MG/DL (ref 70–99)
GLUCOSE SERPL-MCNC: 149 MG/DL (ref 70–99)
HCT VFR BLD AUTO: 24.1 % (ref 35–47)
HGB BLD-MCNC: 7.8 G/DL (ref 11.7–15.7)
IMM GRANULOCYTES # BLD: 0 10E9/L (ref 0–0.4)
IMM GRANULOCYTES NFR BLD: 0.6 %
LACTATE BLD-SCNC: 1.4 MMOL/L (ref 0.7–2.1)
LACTATE BLD-SCNC: 1.4 MMOL/L (ref 0.7–2.1)
LACTATE BLD-SCNC: 1.7 MMOL/L (ref 0.7–2.1)
LYMPHOCYTES # BLD AUTO: 0.4 10E9/L (ref 0.8–5.3)
LYMPHOCYTES NFR BLD AUTO: 19.9 %
MAGNESIUM SERPL-MCNC: 1.7 MG/DL (ref 1.6–2.3)
MCH RBC QN AUTO: 30 PG (ref 26.5–33)
MCHC RBC AUTO-ENTMCNC: 32.4 G/DL (ref 31.5–36.5)
MCV RBC AUTO: 93 FL (ref 78–100)
MICRO REPORT STATUS: NORMAL
MONOCYTES # BLD AUTO: 0.2 10E9/L (ref 0–1.3)
MONOCYTES NFR BLD AUTO: 8.5 %
NEUTROPHILS # BLD AUTO: 1.2 10E9/L (ref 1.6–8.3)
NEUTROPHILS NFR BLD AUTO: 65.9 %
NRBC # BLD AUTO: 0 10*3/UL
NRBC BLD AUTO-RTO: 0 /100
PHOSPHATE SERPL-MCNC: 2.4 MG/DL (ref 2.5–4.5)
PLATELET # BLD AUTO: 23 10E9/L (ref 150–450)
POTASSIUM SERPL-SCNC: 3.9 MMOL/L (ref 3.4–5.3)
POTASSIUM SERPL-SCNC: 4.1 MMOL/L (ref 3.4–5.3)
RBC # BLD AUTO: 2.6 10E12/L (ref 3.8–5.2)
SODIUM SERPL-SCNC: 136 MMOL/L (ref 133–144)
SODIUM SERPL-SCNC: 140 MMOL/L (ref 133–144)
SPECIMEN SOURCE: NORMAL
SPECIMEN SOURCE: NORMAL
VANCOMYCIN SERPL-MCNC: 21.7 MG/L
VANCOMYCIN SERPL-MCNC: 24.2 MG/L
WBC # BLD AUTO: 1.8 10E9/L (ref 4–11)

## 2017-07-03 PROCEDURE — 25000128 H RX IP 250 OP 636: Performed by: INTERNAL MEDICINE

## 2017-07-03 PROCEDURE — 40000133 ZZH STATISTIC OT WARD VISIT: Performed by: OCCUPATIONAL THERAPIST

## 2017-07-03 PROCEDURE — 25000132 ZZH RX MED GY IP 250 OP 250 PS 637: Performed by: NURSE PRACTITIONER

## 2017-07-03 PROCEDURE — 25000128 H RX IP 250 OP 636: Performed by: STUDENT IN AN ORGANIZED HEALTH CARE EDUCATION/TRAINING PROGRAM

## 2017-07-03 PROCEDURE — 97530 THERAPEUTIC ACTIVITIES: CPT | Mod: GP | Performed by: REHABILITATION PRACTITIONER

## 2017-07-03 PROCEDURE — 99232 SBSQ HOSP IP/OBS MODERATE 35: CPT | Mod: GC | Performed by: INTERNAL MEDICINE

## 2017-07-03 PROCEDURE — 21400006 ZZH R&B CCU INTERMEDIATE UMMC

## 2017-07-03 PROCEDURE — 83605 ASSAY OF LACTIC ACID: CPT | Performed by: STUDENT IN AN ORGANIZED HEALTH CARE EDUCATION/TRAINING PROGRAM

## 2017-07-03 PROCEDURE — 25000132 ZZH RX MED GY IP 250 OP 250 PS 637: Performed by: INTERNAL MEDICINE

## 2017-07-03 PROCEDURE — 25000125 ZZHC RX 250: Performed by: STUDENT IN AN ORGANIZED HEALTH CARE EDUCATION/TRAINING PROGRAM

## 2017-07-03 PROCEDURE — 27210437 ZZH NUTRITION PRODUCT SEMIELEM INTERMED LITER

## 2017-07-03 PROCEDURE — 40000802 ZZH SITE CHECK

## 2017-07-03 PROCEDURE — 36592 COLLECT BLOOD FROM PICC: CPT | Performed by: INTERNAL MEDICINE

## 2017-07-03 PROCEDURE — 87493 C DIFF AMPLIFIED PROBE: CPT | Performed by: PHYSICAL MEDICINE & REHABILITATION

## 2017-07-03 PROCEDURE — 36415 COLL VENOUS BLD VENIPUNCTURE: CPT | Performed by: STUDENT IN AN ORGANIZED HEALTH CARE EDUCATION/TRAINING PROGRAM

## 2017-07-03 PROCEDURE — 80202 ASSAY OF VANCOMYCIN: CPT | Performed by: STUDENT IN AN ORGANIZED HEALTH CARE EDUCATION/TRAINING PROGRAM

## 2017-07-03 PROCEDURE — 92526 ORAL FUNCTION THERAPY: CPT | Mod: GN

## 2017-07-03 PROCEDURE — 36592 COLLECT BLOOD FROM PICC: CPT | Performed by: STUDENT IN AN ORGANIZED HEALTH CARE EDUCATION/TRAINING PROGRAM

## 2017-07-03 PROCEDURE — 97140 MANUAL THERAPY 1/> REGIONS: CPT | Mod: GO | Performed by: OCCUPATIONAL THERAPIST

## 2017-07-03 PROCEDURE — 40000225 ZZH STATISTIC SLP WARD VISIT

## 2017-07-03 PROCEDURE — 80048 BASIC METABOLIC PNL TOTAL CA: CPT | Performed by: INTERNAL MEDICINE

## 2017-07-03 PROCEDURE — 40000193 ZZH STATISTIC PT WARD VISIT: Performed by: REHABILITATION PRACTITIONER

## 2017-07-03 PROCEDURE — 97110 THERAPEUTIC EXERCISES: CPT | Mod: GO | Performed by: OCCUPATIONAL THERAPIST

## 2017-07-03 PROCEDURE — 85025 COMPLETE CBC W/AUTO DIFF WBC: CPT | Performed by: STUDENT IN AN ORGANIZED HEALTH CARE EDUCATION/TRAINING PROGRAM

## 2017-07-03 PROCEDURE — 84100 ASSAY OF PHOSPHORUS: CPT | Performed by: INTERNAL MEDICINE

## 2017-07-03 PROCEDURE — 25000132 ZZH RX MED GY IP 250 OP 250 PS 637: Performed by: STUDENT IN AN ORGANIZED HEALTH CARE EDUCATION/TRAINING PROGRAM

## 2017-07-03 PROCEDURE — 00000146 ZZHCL STATISTIC GLUCOSE BY METER IP

## 2017-07-03 PROCEDURE — 80048 BASIC METABOLIC PNL TOTAL CA: CPT | Performed by: STUDENT IN AN ORGANIZED HEALTH CARE EDUCATION/TRAINING PROGRAM

## 2017-07-03 PROCEDURE — 25000132 ZZH RX MED GY IP 250 OP 250 PS 637

## 2017-07-03 PROCEDURE — 83735 ASSAY OF MAGNESIUM: CPT | Performed by: INTERNAL MEDICINE

## 2017-07-03 PROCEDURE — 25000131 ZZH RX MED GY IP 250 OP 636 PS 637: Performed by: STUDENT IN AN ORGANIZED HEALTH CARE EDUCATION/TRAINING PROGRAM

## 2017-07-03 PROCEDURE — 25000125 ZZHC RX 250: Performed by: INTERNAL MEDICINE

## 2017-07-03 PROCEDURE — 92507 TX SP LANG VOICE COMM INDIV: CPT | Mod: GN

## 2017-07-03 RX ORDER — MAGNESIUM SULFATE HEPTAHYDRATE 40 MG/ML
4 INJECTION, SOLUTION INTRAVENOUS ONCE
Status: COMPLETED | OUTPATIENT
Start: 2017-07-03 | End: 2017-07-03

## 2017-07-03 RX ORDER — VANCOMYCIN HYDROCHLORIDE 1 G/200ML
1000 INJECTION, SOLUTION INTRAVENOUS EVERY 12 HOURS
Status: DISCONTINUED | OUTPATIENT
Start: 2017-07-04 | End: 2017-07-03

## 2017-07-03 RX ORDER — FUROSEMIDE 10 MG/ML
40 INJECTION INTRAMUSCULAR; INTRAVENOUS ONCE
Status: COMPLETED | OUTPATIENT
Start: 2017-07-03 | End: 2017-07-03

## 2017-07-03 RX ORDER — FUROSEMIDE 10 MG/ML
20 INJECTION INTRAMUSCULAR; INTRAVENOUS ONCE
Status: COMPLETED | OUTPATIENT
Start: 2017-07-03 | End: 2017-07-03

## 2017-07-03 RX ORDER — POTASSIUM CHLORIDE 20MEQ/15ML
20 LIQUID (ML) ORAL ONCE
Status: COMPLETED | OUTPATIENT
Start: 2017-07-03 | End: 2017-07-03

## 2017-07-03 RX ADMIN — APIXABAN 2.5 MG: 2.5 TABLET, FILM COATED ORAL at 20:41

## 2017-07-03 RX ADMIN — MIDODRINE HYDROCHLORIDE 10 MG: 5 TABLET ORAL at 18:53

## 2017-07-03 RX ADMIN — VANCOMYCIN HYDROCHLORIDE 1250 MG: 10 INJECTION, POWDER, LYOPHILIZED, FOR SOLUTION INTRAVENOUS at 13:16

## 2017-07-03 RX ADMIN — GABAPENTIN 200 MG: 100 CAPSULE ORAL at 08:06

## 2017-07-03 RX ADMIN — PANTOPRAZOLE SODIUM 40 MG: 40 TABLET, DELAYED RELEASE ORAL at 06:12

## 2017-07-03 RX ADMIN — SODIUM CHLORIDE, PRESERVATIVE FREE 5 ML: 5 INJECTION INTRAVENOUS at 10:27

## 2017-07-03 RX ADMIN — Medication 30 MG: at 08:07

## 2017-07-03 RX ADMIN — MICONAZOLE NITRATE: 2 POWDER TOPICAL at 20:42

## 2017-07-03 RX ADMIN — FOLIC ACID 1 MG: 1 TABLET ORAL at 08:06

## 2017-07-03 RX ADMIN — FUROSEMIDE 40 MG: 10 INJECTION, SOLUTION INTRAVENOUS at 13:19

## 2017-07-03 RX ADMIN — MEROPENEM 1 G: 1 INJECTION, POWDER, FOR SOLUTION INTRAVENOUS at 06:13

## 2017-07-03 RX ADMIN — MICONAZOLE NITRATE: 2 POWDER TOPICAL at 08:10

## 2017-07-03 RX ADMIN — GABAPENTIN 200 MG: 100 CAPSULE ORAL at 14:59

## 2017-07-03 RX ADMIN — MIDODRINE HYDROCHLORIDE 10 MG: 5 TABLET ORAL at 11:35

## 2017-07-03 RX ADMIN — MEROPENEM 1 G: 1 INJECTION, POWDER, FOR SOLUTION INTRAVENOUS at 22:26

## 2017-07-03 RX ADMIN — MULTIPLE VITAMINS W/ MINERALS TAB 1 TABLET: TAB at 08:06

## 2017-07-03 RX ADMIN — Medication: at 15:11

## 2017-07-03 RX ADMIN — APIXABAN 2.5 MG: 2.5 TABLET, FILM COATED ORAL at 08:07

## 2017-07-03 RX ADMIN — FUROSEMIDE 20 MG: 10 INJECTION, SOLUTION INTRAVENOUS at 11:35

## 2017-07-03 RX ADMIN — MEROPENEM 1 G: 1 INJECTION, POWDER, FOR SOLUTION INTRAVENOUS at 14:59

## 2017-07-03 RX ADMIN — ATENOLOL 50 MG: 50 TABLET ORAL at 06:17

## 2017-07-03 RX ADMIN — MAGNESIUM SULFATE IN WATER 4 G: 40 INJECTION, SOLUTION INTRAVENOUS at 23:27

## 2017-07-03 RX ADMIN — DIGOXIN 125 MCG: 125 TABLET ORAL at 06:17

## 2017-07-03 RX ADMIN — SODIUM CHLORIDE, PRESERVATIVE FREE 5 ML: 5 INJECTION INTRAVENOUS at 11:53

## 2017-07-03 RX ADMIN — PREDNISONE 7.5 MG: 5 TABLET ORAL at 08:04

## 2017-07-03 RX ADMIN — MIDODRINE HYDROCHLORIDE 10 MG: 5 TABLET ORAL at 08:07

## 2017-07-03 RX ADMIN — SODIUM CHLORIDE, PRESERVATIVE FREE 5 ML: 5 INJECTION INTRAVENOUS at 14:12

## 2017-07-03 RX ADMIN — POTASSIUM CHLORIDE 20 MEQ: 20 SOLUTION ORAL at 23:26

## 2017-07-03 RX ADMIN — VANCOMYCIN HYDROCHLORIDE 1250 MG: 10 INJECTION, POWDER, LYOPHILIZED, FOR SOLUTION INTRAVENOUS at 01:16

## 2017-07-03 RX ADMIN — CALCIUM CARBONATE 600 MG (1,500 MG)-VITAMIN D3 400 UNIT TABLET 2 TABLET: at 08:04

## 2017-07-03 RX ADMIN — GABAPENTIN 200 MG: 100 CAPSULE ORAL at 20:41

## 2017-07-03 NOTE — PLAN OF CARE
Problem: Goal Outcome Summary  Goal: Goal Outcome Summary  PT - per plan established by the Physical Therapist, according to functional mobility the  discharge recommendation is return to ARU for cont skilled PT to progress functional mobility. Pt very willing to be seen today. Pt needing min A for log roll for sling placement. Pt mech lift to Moveo. Pt demo moveo 25-30(+) degress for total time of 30 min. Pt demo 3 sets of 15 and one set of 10 squats. Pt BP all WNL.   Pt to progress Moveo and core stability activities

## 2017-07-03 NOTE — PROGRESS NOTES
Cardiology Daily Note   Date of Service: 6/30/2017  Patient: Amelia Michel  MRN: [id number]  Admission Date: 6/19/2017  Hospital Day # 14    Assessment & Plan:   Amelia Michel is a 56 year old female with PMHx of VSD s/p repair (1969), RA, recently diagnosed Atrial fibrillation that was complicated with an OOH cardiac arrest (felt 2/2 long pause with degeneration to VFib while converting Afib to SR with multiple AV prieto agents) who was readmitted from ARU on 6/19 with fever and concern for worsening LUE infection. She is now transferred back to cardiology service as primary on 6/26 for further workup and evaluation of recurrent lactic acidosis, persistent afib and softer blood pressures with tachypnea.         Changes Today:  - gentle diuresis   - continue meropenem  - d/c vancomycin     # Volume overload/anasarca:  # HFrEF (EF 50-55%):  # Hypotension:  Patient initially up 35# at admission; diuresis initially challenging, secondary to third spacing of fluid (albumin 1.9), but briskly responded with addition of midodrine and ongoing albumin resuscitation. Right heart cath 6/26 with mildly elevated right and left sided filling presures  - Gentle diuresis today with furosemide     # Atrial fibrillation/flutter with RVR:  RVR in setting of volume overload above.  - Continue dig 125mcg  - Continue atenolol 50mg daily  - Continue apixaban     # Out of hospital arrest:  Pt will need to wear LifeVest until medically appropriate and cleared from infection/wound standpoint for ICD implantation likely 4-6 weeks after discharge per EP (pending hospital course for LUE wound)    # Sepsis, likely secondary to LUE wound cellulitis  Inciting wound secondary to possible IV extravasation during arrest with surrounding erythema consistent with cellulitis. Wound culture on 6/19 grew Enterobacter cloacae complex, Enterococcus faecalis and coagulase negative staphylococcus. Broadened to vancomycin and meropenem given new  fevers and worsening surrounding skin changes. See note dated from 6/30 for further details regarding prior ID and surgery recommendations.   - D/C vancomycin  - Meropenem  - Pain control: Tramadol 25mg q6h prn, Continue morphine gel around edges of wound for pain   - WOCN      # Acute on Chronic Thrombocytopenia:   2/2 hypoproliferative bone marrow. Plts noted to decrease from 104 to as low as 15 during recent hospitalization requiring transfusion w/ chronically low in the 's as outpatient. Possible bactrim is offending agent for exacerbation, and discontinued this medication in the setting of worsening cellulitis.   -- If this is chronic ITP, no need for treatment unless plts drift < 10s if not bleeding  -- Continue to hold RA meds for now  -- Apixaban 2.5 mg BID (held 6/26-6/27, resumed 6/28). This will need to be held 2 days prior to ICD placement after discharge (this will be deferred given acute infection).    # Acute on chronic anemia: Stable  No evidence of overt bleeding (stool light brown). T Bili normal.  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 Now on 10 mg daily, taper to 7.5 mg on 7/1, then to 5 mg daily after 2 weeks if continues to have low disease activity  -- Continue to hold HCQ and MTX until outpatient follow-up  -- Follow-up with [facility] Dr. Conor Cunha in 4-6 weeks after discharge      # Severe Critical Illness Myopathy:   Significant deconditioning w/ median neuropathies most likely localized to the wrists and ulnar neuropathies most likely localized to the elbows secondary to RA.  -- Ongoing extensive PT/OT/SLP      # Malnutrition:   -- HOLDING tube feeds given ongoing loose stools, encourage PO intake  --  "Nutrition recommendations are to continue Peptamen 1.5 Tube feeds to 40 ml/hr x 11 hours which will provide 440 ml TF, 660 kcals, and 30 g protein daily. TFs may be adjusted pending oral intake. Once kcal counts reveal that patient is consuming at least 900 kcals and 45 g protein daily on average can discontinue.  -- Water flushes as needed.  -- Nutrition on board.     Consulting Services: Nutrition, ID, heart failure service    CODE: Full Code  DVT Ppx: Apixaban resumed 6/28  Diet/Fluids: As above.  Disposition: Pending improvement in above.    Pt's care was discussed with bedside RN and patient during Care Team Rounds.    Patient was discussed and evaluated with Dr. Villarreal.    Kelly Smith  Internal Medicine PGY2  [phone]    ___________________________________________________________________    Subjective & Interval History:     Last 24 hr care team notes reviewed. No acute events overnight. No f/c/s. No difficulty breathing, cough. No diarrhea. No abdominal pain. No change in pain around left arm.   Medications: Reviewed in EPIC. List below for reference    Physical Exam:    Blood pressure 115/69, pulse 97, temperature 98.1  F (36.7  C), temperature source Oral, resp. rate 20, height 1.48 m (4' 10.25\"), weight 84.5 kg (186 lb 4.8 oz), SpO2 96 %, not currently breastfeeding.    CONSTITUTIONAL:  Ms. Michel is supine in bed in no apparent distress.  HEENT: OP Clear.  MMM. PERRLA. EOMI.   LUNGS: Normal respiratory effort without increased work of breathing. Lungs clear in anterior fields, mild crackles in bases. No wheezes appreciated.  CARDIOVASCULAR: Irregularly irregular, tachycardic w/ no M/R/G.   ABDOMEN: Soft, distended (edematous abdominal wall), NT, BS +ve.   MUSCULOSKELETAL:  Moves all four extremities without difficulty. Diffuse anasarca.  NEURO: A&Ox3, CN II-XII grossly intact, non-focal.   Skin: Wound in LUE without significant surround erythema. + surrounding edema     Lines/Tubes: Rectal tube, brand " catheter  PICC Triple Lumen 06/26/17 Right Basilic ok to use. (Active)   Site Assessment WDL 6/26/2017  1:29 PM   External Cath Length (cm) 3 cm 6/26/2017  1:29 PM   Extremity Circumference (cm) 35 cm 6/26/2017  1:29 PM   Dressing Intervention Chlorhexidine patch;Transparent;Securing device;New dressing 6/26/2017  1:29 PM   Dressing Change Due 07/03/17 6/26/2017  1:29 PM   PICC Lumen Assessment Trigger Red;White;Gray 6/26/2017  1:29 PM   Number of days:0       Labs & Studies of Note: Reviewed in Epic  CMP    Recent Labs  Lab 07/03/17  0704 07/02/17  0641 07/01/17  1350 07/01/17  0910 06/30/17  2147  06/28/17  0555 06/27/17  0826    138  --  137 137  < > 138 138   POTASSIUM 4.1 4.2 4.0 3.1* 3.1*  < > 4.2 4.7   CHLORIDE 107 105  --  102 104  < > 109 110*   CO2 27 27  --  27 27  < > 23 21   ANIONGAP 6 6  --  8 7  < > 6 7   * 118*  --  166* 171*  < > 165* 184*   BUN 30 36*  --  37* 39*  < > 43* 39*   CR 0.50* 0.66  --  0.72 0.70  < > 0.65 0.65   GFRESTIMATED >90Non  GFR Calc >90Non  GFR Calc  --  84 87  < > >90Non  GFR Calc >90Non  GFR Calc   GFRESTBLACK >90African American GFR Calc >90African American GFR Calc  --  >90African American GFR Calc >90African American GFR Calc  < > >90African American GFR Calc >90African American GFR Calc   VIKTORIYA 8.2* 8.1*  --  8.0* 7.4*  < > 7.4* 7.3*   MAG  --   --  2.3 1.4*  --   --   --   --    PROTTOTAL  --   --   --   --   --   --  3.9* 4.2*   ALBUMIN  --   --   --   --   --   --  1.5* 1.4*   BILITOTAL  --  0.9  --   --   --   --  0.8 0.6   ALKPHOS  --   --   --   --   --   --  126 129   AST  --   --   --   --   --   --  41 54*   ALT  --   --   --   --   --   --  50 60*   < > = values in this interval not displayed.    CBC    Recent Labs  Lab 07/03/17  0704 07/02/17  1029 07/01/17  0944 06/30/17  2147 06/30/17  0703   WBC 1.8* 1.9* 1.9*  --  2.3*   RBC 2.60* 2.95* 2.82*  --  2.38*   HGB 7.8* 8.8* 8.5* 8.6* 7.6*    HCT 24.1* 26.8* 25.4*  --  21.9*   PLT 23* 30* 20*  --  26*       INR    Recent Labs  Lab 06/28/17  1609   INR 1.31*       Unresulted Labs Ordered in the Past 30 Days of this Admission     Date and Time Order Name Status Description    7/3/2017 1522 Clostridium difficile toxin B PCR In process     7/2/2017 1523 Urine Culture Aerobic Bacterial Preliminary     7/2/2017 1224 Blood culture Preliminary     7/2/2017 1224 Blood culture Preliminary     7/1/2017 1103 Blood culture Preliminary     7/1/2017 1103 Blood culture Preliminary     5/29/2017 1046 Adalimumab Activity and Neutralizing Antibodies: Laboratory Miscellaneous Order In process           Medication List for Reference (delete if desired)  Current Facility-Administered Medications   Medication     meropenem (MERREM) 1 g vial to attach to  mL bag     miconazole (MICATIN; MICRO GUARD) 2 % powder     predniSONE (DELTASONE) tablet 7.5 mg     apixaban ANTICOAGULANT (ELIQUIS) tablet 2.5 mg     midodrine (PROAMATINE) tablet 10 mg     lidocaine (LMX4) kit     calcium carbonate (TUMS) chewable tablet 500 mg     pantoprazole (PROTONIX) EC tablet 40 mg     digoxin (LANOXIN) tablet 125 mcg     ondansetron (ZOFRAN) tablet 4 mg     prochlorperazine (COMPAZINE) injection 5-10 mg     ondansetron (ZOFRAN) injection 4 mg     simethicone (MYLICON) chewable tablet 80 mg     sodium chloride (PF) 0.9% PF flush 10-20 mL     sodium chloride (PF) 0.9% PF flush 10 mL     heparin lock flush 10 UNIT/ML injection 5-10 mL     heparin lock flush 10 UNIT/ML injection 5-10 mL     dextrose 10 % 1,000 mL infusion     atenolol (TENORMIN) tablet 50 mg     folic acid (FOLVITE) tablet 1 mg     gabapentin (NEURONTIN) capsule 200 mg     melatonin tablet 3 mg     morphine 0.1% in solosite topical gel 2 g     multivitamin, therapeutic with minerals (THERA-VIT-M) tablet 1 tablet     triamcinolone (KENALOG) 0.1 % ointment     lidocaine 1 % 1 mL     lidocaine (LMX4) kit     sodium chloride (PF)  0.9% PF flush 3 mL     sodium chloride (PF) 0.9% PF flush 3 mL     medication instruction     acetaminophen (TYLENOL) tablet 650 mg     acetaminophen (TYLENOL) Suppository 650 mg     Patient is already receiving anticoagulation with heparin, enoxaparin (LOVENOX), warfarin (COUMADIN)  or other anticoagulant medication     glucose 40 % gel 15-30 g    Or     dextrose 50 % injection 25-50 mL    Or     glucagon injection 1 mg     insulin aspart (NovoLOG) inj (RAPID ACTING)     insulin aspart (NovoLOG) inj (RAPID ACTING)     naloxone (NARCAN) injection 0.1-0.4 mg     calcium-vitamin D (CALTRATE) 600-400 MG-UNIT per tablet 2 tablet     co-enzyme Q-10 capsule 30 mg     morphine 0.1% in solosite topical gel     potassium chloride SA (K-DUR/KLOR-CON M) CR tablet 20-40 mEq     potassium chloride (KLOR-CON) Packet 20-40 mEq     potassium chloride 10 mEq in 100 mL intermittent infusion     potassium chloride 10 mEq in 100 mL intermittent infusion with 10 mg lidocaine     potassium chloride 20 mEq in 50 mL intermittent infusion     magnesium sulfate 2 g in NS intermittent infusion (PharMEDium or FV Cmpd)     magnesium sulfate 4 g in 100 mL sterile water (premade)     sodium phosphate 10 mmol in D5W intermittent infusion     sodium phosphate 15 mmol in D5W intermittent infusion     sodium phosphate 20 mmol in D5W intermittent infusion     sodium phosphate 25 mmol in D5W intermittent infusion     melatonin tablet 1 mg     traMADol (ULTRAM) half-tab 25 mg     ATTENDING ATTESTATION:  Patient has been seen and evaluated by me on July 3, 2017. I have reviewed the medications, laboratory, imaging, and other relevant results.  I agree with the above note which I have edited, as necessary.  I have discussed my assessment and plan with the house staff.    Tere Villarreal MD, MS  Staff Cardiologist, [facility]  Pager: [phone]

## 2017-07-03 NOTE — PROGRESS NOTES
CLINICAL NUTRITION SERVICES - REASSESSMENT NOTE     Nutrition Prescription    RECOMMENDATIONS FOR MDs/PROVIDERS TO ORDER:  Rec nutrition support as pt consuming less than 512 kcals and 33 g protein daily on average (6/20-6/22, 6/26-6/28) which does not meet nutrition needs. See recs #8-9 below.     Future/Additional Recommendations:  1. Continue regular diet as per SLP and to help encourage oral intake.  Encourage high protein and high kcal options, including oral supplements, as tolerated. Monitor kcal counts (7/3-7/5).   2. Vitamin C, zinc, and vitamin A supplementation may be resumed for an additional ten-day course if wound is not healing, as per provider discretion..   3. Consider scheduling alternate antiemetics/antinauseants (other than zofran and compazine as per pt preference) ~20 minutes prior to meals to help optimize nausea control.     4. Continue thera-vit-M as ordered to ensure micronutrient needs are met. Continue calcium/vitamin D as ordered due to prednisone.  5. Consider checking folic acid lab to assess need to continue current supplementation.   6. Assess ability to discontinue coenzyme Q10, if able, to minimize pills.  7. Minimize suspension medications.   8. Rec restarting TFs which are currently ordered, Peptamen 1.5 (TF which was started initially on 5/31/17), re-initiating at 10 mL/hr, adv by 10 mL Q 8 hrs to goal rate of 40 mL/hr.  If no improvements in stooling, order a GI consult. Also, consider ordering Nutrisource Fiber, 1 pkt TID via feeding tube (if appropriate, pending hemodynamic stability) to see if this helps stools. This may be increased by 2 pkts per day until 8 packets per day is reached. Could consider probiotics if/when appropriate. If desire to try an alternate TF regimen, then rec TwoCal HN (concentrated, low fiber) with goal rate of 30 ml/hr (720 ml/day) to provide 1440 kcals, (28 kcals/kg/day), 60 g PRO, (1.2 gms/kg/day), 504 ml free H2O, 158 g CHO and 4 g Fiber daily.  If ordering TwoCal HN, then also order 1 pkt Beneprotein BID via feeding tube . Each packet of Beneprotein provides 6 g protein, 25 kcals, and 60 mL H2O.  Adjust free water flushes via feeding tube as needed, pending fluid status and oral intake as free water flushes currently ordered via feeding tube are for patency only.  9. With duration of enteral nutrition, monitor possible need for longer-term feeding tube option if pt medically declines or if oral intake remains inadequate. Monitor for possible ability to adjust TFs if needed, especially once oral intake improves. Goal for TFs + oral intake to meet 100% of nutrition needs. Once consuming at least 935 kcals and 46 g protein daily, then rec discontinue TFs.     EVALUATION OF THE PROGRESS TOWARD GOALS   Diet: Regular diet with thin liquids 6/22. Has a prn supplement order. Is ordered to receive 1 cup of 1% milk with 1 packet of Beneprotein  Intake: Fair diet tolerance. Flowsheets indicate pt consuming 75% of meals with a fair to good appetite 6/27, % of meals with a fair to good appetite 6/28, 50-75% of meals with a fair appetite 6/29, 75% of meals with a fair to good appetite 6/30, 100% of meals with a fair appetite 7/1, and 100% of toast and a poor appetite. Per RN on 7/3, poor intake on 7/2 and pt had only two pieces of toast. Per chart, pt with fatigue. Per team, no diarrhea and no abdominal pain. Pt was busy with provider during attempts to see. Per [employer], pt ordered one meal on 7/3 which totaled 254 kcals and 11 g protein daily.      Kcal counts:   6/20   765 kcals and 56 g protein (meal/s and 100% one packet Beneprotein supplement/s recorded)   6/21   628 kcals and 44 g protein (meal/s and 100% Ensure Clear and one packet Beneprotein supplement/s recorded)   6/22   421 kcals and 29 g protein (one meal/s and no supplement/s recorded)   6/26   656 kcals and 32 g protein (meal/s and 100% Ensure Clear supplement/s recorded)   6/27     76 kcals and  2 g protein (meal/s and no supplement/s recorded)   6/28    524 kcals and 32 g protein (meal/s and 100% of one Beneprotein supplement/s recorded)    *  Pt consumed a two-day average (6/27-6/28) of 300 kcals and 17 g protein daily. This does not meet estimated needs of 7915-7532 kcals/day (25 - 30 kcals/kg) and 61-77 grams protein/day (1.2 - 1.5 grams of pro/kg). Pt consuming less than 512 kcals and 33 g protein daily on average (6/20-6/22, 6/26-6/28) which does not meet nutrition needs.     Nutrition Support:   - Feeding Tube (FT) access:  NDT + bridle placed via [employer] 5/31  - TF regimen:  Peptamen 1.5 at goal 40 ml/hr (960 ml/day) to provide 1440 kcals (28 kcal/kg/day), 65 g PRO (1.3 g/kg/day), 739 ml free H2O, 54 g Fat (70% from MCTs), 180 g CHO and no Fiber daily.   - Feeding Tube Flushes:  30 mL Q 4 hrs  - Intake:  Pt received a five-day TF average of 736 mL/day. This provided 1104 kcals and 50 g protein and does not meet pt's estimated needs noted above. TFs were changed from cycled to continuous on 6/27 to better meet nutrition needs. Although TFs are currently ordered, team has placed TFs on hold since 7/1 to see if stooling decreases.     NEW FINDINGS   6/28 WOC nurse: Large area of epidermal loss with potential full thickness skin loss at area of necrosis 2/2 extravastion.  Width slightly increased most likely 2/2 increased edema in entire UE.  Decreased erythema and pain, cellulitis appears to be resolving.  Necrotic tissue moister today, appears to be very slowly debriding. Pt was ordered to receive beta carotene, vitamin C, and zinc 6/18-6/28 and refused supplementation on 6/19.    7/1 CT abd/pelvis: Although heterogeneous appearance of the spleen may be related CT technique, wedgelike areas of hypoattenuation raise suspicion for splenic infarct. Extensive anasarca, new since 5/29/2017. Mild periportal edema. Resolved small peripancreatic fluid collection.Small free fluid in the pelvis,  increased.    MALNUTRITION  % Intake: Decreased intake does not meet criteria  % Weight Loss: Not meeting this criteria. Difficult to assess with fluid status.  Subcutaneous Fat Loss: None observed, but difficult to assess with fluid status as per previous nutrition note. Pt busy during attempts to see today (7/3).   Muscle Loss: None observed, but difficult to assess with fluid status. Possible losses as per previous nutrition note. Pt busy during attempts to see today (7/3).   Fluid Accumulation/Edema: Moderate  Malnutrition Diagnosis: Unable to determine due to pt busy and difficult to assess with fluid status changes.    Previous Goals   Total average nutrition intake to meet 1651-3020 kcals/day (25 - 30 kcals/kg) and 61-77 grams protein/day (1.2 - 1.5 grams of pro/kg).  Evaluation: Not meeting since 7/1.    Previous Nutrition Diagnosis  Inadequate oral intake related to nausea and medications as evidenced by pt consumed a four-day kcal count average of 617 kcals and 40 g protein daily which does not meet estimated needs of 9250-9623 kcals/day (25 - 30 kcals/kg) and 61-77 grams protein/day (1.2 - 1.5 grams of pro/kg).   Evaluation: Unresolved.    CURRENT NUTRITION DIAGNOSIS   Inadequate oral intake related to nausea and medications as evidenced by pt consumed a two-day average (6/27-6/28) of 300 kcals and 17 g protein daily which does not meet estimated needs of 4254-5740 kcals/day (25 - 30 kcals/kg) and 61-77 grams protein/day (1.2 - 1.5 grams of pro/kg).    INTERVENTIONS  Implementation  Collaboration with other providers: Paged team regarding oral intake and recommendation for nutrition support.    Goals  Total average nutrition intake to meet 2943-6894 kcals/day (25 - 30 kcals/kg) and 61-77 grams protein/day (1.2 - 1.5 grams of pro/kg).    Monitoring/Evaluation  Progress toward goals will be monitored and evaluated per protocol.     Nutrition will continue to follow.    Inés Brown, MS, RD, LD, CNSC    [facility]:  [phone]

## 2017-07-03 NOTE — PLAN OF CARE
Problem: Goal Outcome Summary  Goal: Goal Outcome Summary  Outcome: No Change  Pt A/Ox4, VSS on 2L O2 overnight. Pt denies pain. Pt up with assist 1-2. Huynh in place with good output. Tube feeds being held due to loose stools. Pt had poor appetite yesterday, encouraged to eat, pt had 2 pieces of toast (only intake food yesterday). Repositioned frequently overnight. ABX given. Pt's LA at 0200 was 1.7 (previous 1.6). Continue to monitor and notify MD of questions or concerns.     Pt's 's Afib/flutter at 0600, MD notified, digoxin and atenolol given early per cards 1

## 2017-07-03 NOTE — PROGRESS NOTES
Social Work Services Progress Note    Hospital Day: 15  Date of Initial Social Work Evaluation:  6/20/17  Collaborated with:  Christin 1 team    Data:  Pt is a 56 year old female who was transferred from [facility].  SW following for placement back at [facility] as able.    Intervention:  No updates or changes today.  SW continuing to follow for discharge planning once more medically stable.  Will continue to assess if appropriate for ARU readmission or if TCU is needed.    Assessment:  No changes in assessment for today.    Plan:    Anticipated Disposition:  Facility:  Return to [facility] as able.    Barriers to d/c plan:  Medical stability    Follow Up:   follow for discharge planning.    DENNIS Larsen, SETHW  [address] Unit   Phone: [phone]  Pager: [phone]  Unit: [phone]      ___________________________________________________________________________________________________________________________________________________    Referrals in process:    ARU - return when medically stable.     Referrals Discontinued:  None    Community Case Management/Community Services in place:   None

## 2017-07-03 NOTE — PLAN OF CARE
"Problem: Goal Outcome Summary  Goal: Goal Outcome Summary  D/I/A: Pt's temperature increased throughout am with tmax of 102.4 axillary, face flushed & clammy to the touch. MD's notified. Lactic acid drawn with result of 3.2, up from 1.7 four hours prior. Albumin ordered and given, tylenol given for symptoms. Blood cultures and urine cultures drawn. Temp decreased to 100 axillary one hour after tylenol, and later decreased to 98.5 axillary. Huynh patent with adequate urine output. Pt denied pain. Patient reported feeling \"really tired\" and fatigued today and did sleep several hours in the afternoon.      Plan: Next lactic acid to be drawn at 2200; Continue to monitor labs, daily weights, I &O's and vital signs closely. Notify team for any changes and/or concerns.     Eli Kennedy, RN  Cardiology      "

## 2017-07-03 NOTE — PHARMACY-VANCOMYCIN DOSING SERVICE
Pharmacy Vancomycin Note  Date of Service July 3, 2017  Patient's  1960   56 year old, female    Indication: Sepsis  Goal Trough Level: 15-20 mg/L  Day of Therapy: 3  Current Vancomycin regimen:  1250 mg IV q12h    Current estimated CrCl = Estimated Creatinine Clearance: 167.6 mL/min (based on Cr of 0.5).    Creatinine for last 3 days  2017:  9:47 PM Creatinine 0.70 mg/dL  2017:  9:10 AM Creatinine 0.72 mg/dL  2017:  6:41 AM Creatinine 0.66 mg/dL  7/3/2017:  7:04 AM Creatinine 0.50 mg/dL    Recent Vancomycin Levels (past 3 days)  7/3/2017: 12:08 AM Vancomycin Level 21.7 mg/L    Vancomycin IV Administrations (past 72 hours)                   vancomycin (VANCOCIN) 1,250 mg in NaCl 0.9 % 250 mL intermittent infusion (mg) 1,250 mg New Bag 17 0116     1,250 mg New Bag 17 1503     1,250 mg New Bag  0017    vancomycin (VANCOCIN) 1,500 mg in NaCl 0.9 % 250 mL intermittent infusion (mg) 1,500 mg New Bag 17 1324                Nephrotoxins and other renal medications (Future)    Start     Dose/Rate Route Frequency Ordered Stop    17 0100  vancomycin (VANCOCIN) 1,250 mg in NaCl 0.9 % 250 mL intermittent infusion      1,250 mg Intravenous EVERY 12 HOURS 17 1115               Contrast Orders - past 72 hours (72h ago through future)    Start     Dose/Rate Route Frequency Ordered Stop    17 1715  iopamidol (ISOVUE-370) solution 115 mL      115 mL Intravenous ONCE 17 1700 17 1730          Interpretation of levels and current regimen:  Trough level is above goal range, however level represents a 9 hour level and is not representative of a true trough. Anticipate level is within 15-20 goal range at 12 hours, will plan to recheck level prior to next dose to confirm    Has serum creatinine changed > 50% in last 72 hours: No  Urine output:  good urine output  Renal Function: Stable    Plan:  1.  Continue Current Dose  2.  Pharmacy will check trough level prior to next  dose  3. Serum creatinine levels will be ordered daily for the first week of therapy and at least twice weekly for subsequent weeks.      Valery Edwards, PharmD, BCPS  Pager [phone]        .

## 2017-07-03 NOTE — PLAN OF CARE
Problem: Goal Outcome Summary  Goal: Goal Outcome Summary  OT/6C: Pt completed seated exercises to promote I for ADLs. Pt tolerated dependent lift     REC: TCU due to decreased ADL I

## 2017-07-03 NOTE — PLAN OF CARE
Problem: Goal Outcome Summary  Goal: Goal Outcome Summary  PT AOx4 VSS. Pt had no c/o of pain or sob. 1 liter NC. Multiple stools today. C-Diff sample sent. Lasix restarted with good results. Much improved appetite. Will continue with care plan, continue to monitor and notify MD's of any changes.

## 2017-07-03 NOTE — PHARMACY-VANCOMYCIN DOSING SERVICE
Pharmacy Vancomycin Note  Date of Service July 3, 2017  Patient's  1960   56 year old, female    Indication: Sepsis  Goal Trough Level: 15-20 mg/L  Day of Therapy: 3  Current Vancomycin regimen:  1250 mg IV q12h    Current estimated CrCl = Estimated Creatinine Clearance: 167.6 mL/min (based on Cr of 0.5).    Creatinine for last 3 days  2017:  9:47 PM Creatinine 0.70 mg/dL  2017:  9:10 AM Creatinine 0.72 mg/dL  2017:  6:41 AM Creatinine 0.66 mg/dL  7/3/2017:  7:04 AM Creatinine 0.50 mg/dL    Recent Vancomycin Levels (past 3 days)  7/3/2017: 12:08 AM Vancomycin Level 21.7 mg/L; 11:55 AM Vancomycin Level 24.2 mg/L    Vancomycin IV Administrations (past 72 hours)                   vancomycin (VANCOCIN) 1,250 mg in NaCl 0.9 % 250 mL intermittent infusion (mg) 1,250 mg New Bag 17 1316     1,250 mg New Bag  0116     1,250 mg New Bag 17 1503     1,250 mg New Bag  0017                Nephrotoxins and other renal medications (Future)    Start     Dose/Rate Route Frequency Ordered Stop    17 0100  vancomycin (VANCOCIN) 1,250 mg in NaCl 0.9 % 250 mL intermittent infusion      1,250 mg Intravenous EVERY 12 HOURS 17 1115               Contrast Orders - past 72 hours (72h ago through future)    Start     Dose/Rate Route Frequency Ordered Stop    17 1715  iopamidol (ISOVUE-370) solution 115 mL      115 mL Intravenous ONCE 17 1700 17 1730          Interpretation of levels and current regimen:  Trough level is above goal range and represents a 10.75 hour trough. Looking at previous goal, patient appears to be accumulating vancomycin. Will decrease dose.     Has serum creatinine changed > 50% in last 72 hours: No  Urine output:  good urine output  Renal Function: Stable    Plan:  1.  Decrease Dose to 1000 mg IV Q12H  2.  Pharmacy will check trough levels as appropriate in 1-3 Days.    3. Serum creatinine levels will be ordered daily for the first week of therapy and at  least twice weekly for subsequent weeks.      Valery Edwards, PharmD, BCPS  Pager [phone]      .

## 2017-07-03 NOTE — PLAN OF CARE
Problem: Goal Outcome Summary  Goal: Goal Outcome Summary  Edema 6C: Wraps removed and skin cares completed. Skin remains intact without redness or skin breakdown; firm 2+ pitting throughout up to hips. Reapplication of GCB from MTPs to knee creases for continued management of LE edema and increased ease in functional mobility. Will re-initiate alternating full leg compression when loose stools resolves. Remove wraps if causing pain/numbness or become soiled.

## 2017-07-03 NOTE — PLAN OF CARE
"Problem: Goal Outcome Summary  Goal: Goal Outcome Summary     SLP 6C: Pt seen for communication tx. Pt continues to improve, but reports language skills still slightly below baseline. Pt not independent in use of strategies for word-finding this session. Recommended pt establish \"talking points\" and plan ahead when engaged in long conversational tasks such as telephone calls. SLP to follow.       "

## 2017-07-04 ENCOUNTER — APPOINTMENT (OUTPATIENT)
Dept: PHYSICAL THERAPY | Facility: CLINIC | Age: 57
DRG: 871 | End: 2017-07-04
Attending: INTERNAL MEDICINE
Payer: COMMERCIAL

## 2017-07-04 LAB
ANION GAP SERPL CALCULATED.3IONS-SCNC: 8 MMOL/L (ref 3–14)
ANION GAP SERPL CALCULATED.3IONS-SCNC: 9 MMOL/L (ref 3–14)
BASOPHILS # BLD AUTO: 0 10E9/L (ref 0–0.2)
BASOPHILS NFR BLD AUTO: 0.7 %
BUN SERPL-MCNC: 26 MG/DL (ref 7–30)
BUN SERPL-MCNC: 29 MG/DL (ref 7–30)
CALCIUM SERPL-MCNC: 7.7 MG/DL (ref 8.5–10.1)
CALCIUM SERPL-MCNC: 8.1 MG/DL (ref 8.5–10.1)
CHLORIDE SERPL-SCNC: 100 MMOL/L (ref 94–109)
CHLORIDE SERPL-SCNC: 101 MMOL/L (ref 94–109)
CO2 SERPL-SCNC: 26 MMOL/L (ref 20–32)
CO2 SERPL-SCNC: 28 MMOL/L (ref 20–32)
CREAT SERPL-MCNC: 0.54 MG/DL (ref 0.52–1.04)
CREAT SERPL-MCNC: 0.58 MG/DL (ref 0.52–1.04)
DIFFERENTIAL METHOD BLD: ABNORMAL
EOSINOPHIL # BLD AUTO: 0 10E9/L (ref 0–0.7)
EOSINOPHIL NFR BLD AUTO: 0.7 %
ERYTHROCYTE [DISTWIDTH] IN BLOOD BY AUTOMATED COUNT: 22.3 % (ref 10–15)
GFR SERPL CREATININE-BSD FRML MDRD: ABNORMAL ML/MIN/1.7M2
GFR SERPL CREATININE-BSD FRML MDRD: ABNORMAL ML/MIN/1.7M2
GLUCOSE BLDC GLUCOMTR-MCNC: 129 MG/DL (ref 70–99)
GLUCOSE BLDC GLUCOMTR-MCNC: 156 MG/DL (ref 70–99)
GLUCOSE SERPL-MCNC: 100 MG/DL (ref 70–99)
GLUCOSE SERPL-MCNC: 191 MG/DL (ref 70–99)
HCT VFR BLD AUTO: 26.6 % (ref 35–47)
HGB BLD-MCNC: 8.7 G/DL (ref 11.7–15.7)
IMM GRANULOCYTES # BLD: 0 10E9/L (ref 0–0.4)
IMM GRANULOCYTES NFR BLD: 0 %
LACTATE BLD-SCNC: 1.6 MMOL/L (ref 0.7–2.1)
LYMPHOCYTES # BLD AUTO: 0.4 10E9/L (ref 0.8–5.3)
LYMPHOCYTES NFR BLD AUTO: 28.1 %
MAGNESIUM SERPL-MCNC: 2.4 MG/DL (ref 1.6–2.3)
MCH RBC QN AUTO: 29.9 PG (ref 26.5–33)
MCHC RBC AUTO-ENTMCNC: 32.7 G/DL (ref 31.5–36.5)
MCV RBC AUTO: 91 FL (ref 78–100)
MONOCYTES # BLD AUTO: 0.1 10E9/L (ref 0–1.3)
MONOCYTES NFR BLD AUTO: 9.6 %
NEUTROPHILS # BLD AUTO: 0.9 10E9/L (ref 1.6–8.3)
NEUTROPHILS NFR BLD AUTO: 60.9 %
NRBC # BLD AUTO: 0 10*3/UL
NRBC BLD AUTO-RTO: 0 /100
PHOSPHATE SERPL-MCNC: 2.9 MG/DL (ref 2.5–4.5)
PLATELET # BLD AUTO: 36 10E9/L (ref 150–450)
POTASSIUM SERPL-SCNC: 3.8 MMOL/L (ref 3.4–5.3)
POTASSIUM SERPL-SCNC: 4 MMOL/L (ref 3.4–5.3)
RBC # BLD AUTO: 2.91 10E12/L (ref 3.8–5.2)
SODIUM SERPL-SCNC: 136 MMOL/L (ref 133–144)
SODIUM SERPL-SCNC: 136 MMOL/L (ref 133–144)
WBC # BLD AUTO: 1.5 10E9/L (ref 4–11)

## 2017-07-04 PROCEDURE — 97530 THERAPEUTIC ACTIVITIES: CPT | Mod: GP | Performed by: PHYSICAL THERAPIST

## 2017-07-04 PROCEDURE — 25000128 H RX IP 250 OP 636: Performed by: INTERNAL MEDICINE

## 2017-07-04 PROCEDURE — 25000125 ZZHC RX 250: Performed by: STUDENT IN AN ORGANIZED HEALTH CARE EDUCATION/TRAINING PROGRAM

## 2017-07-04 PROCEDURE — 40000558 ZZH STATISTIC CVC DRESSING CHANGE

## 2017-07-04 PROCEDURE — 25000132 ZZH RX MED GY IP 250 OP 250 PS 637: Performed by: INTERNAL MEDICINE

## 2017-07-04 PROCEDURE — 36592 COLLECT BLOOD FROM PICC: CPT | Performed by: INTERNAL MEDICINE

## 2017-07-04 PROCEDURE — 80048 BASIC METABOLIC PNL TOTAL CA: CPT | Performed by: STUDENT IN AN ORGANIZED HEALTH CARE EDUCATION/TRAINING PROGRAM

## 2017-07-04 PROCEDURE — 83605 ASSAY OF LACTIC ACID: CPT | Performed by: INTERNAL MEDICINE

## 2017-07-04 PROCEDURE — 80048 BASIC METABOLIC PNL TOTAL CA: CPT | Performed by: INTERNAL MEDICINE

## 2017-07-04 PROCEDURE — 40000802 ZZH SITE CHECK

## 2017-07-04 PROCEDURE — 25000132 ZZH RX MED GY IP 250 OP 250 PS 637

## 2017-07-04 PROCEDURE — 40000193 ZZH STATISTIC PT WARD VISIT: Performed by: PHYSICAL THERAPIST

## 2017-07-04 PROCEDURE — 21400006 ZZH R&B CCU INTERMEDIATE UMMC

## 2017-07-04 PROCEDURE — 97116 GAIT TRAINING THERAPY: CPT | Mod: GP | Performed by: PHYSICAL THERAPIST

## 2017-07-04 PROCEDURE — 25000132 ZZH RX MED GY IP 250 OP 250 PS 637: Performed by: NURSE PRACTITIONER

## 2017-07-04 PROCEDURE — 83735 ASSAY OF MAGNESIUM: CPT | Performed by: STUDENT IN AN ORGANIZED HEALTH CARE EDUCATION/TRAINING PROGRAM

## 2017-07-04 PROCEDURE — 85025 COMPLETE CBC W/AUTO DIFF WBC: CPT | Performed by: PHYSICAL MEDICINE & REHABILITATION

## 2017-07-04 PROCEDURE — 25000125 ZZHC RX 250: Performed by: INTERNAL MEDICINE

## 2017-07-04 PROCEDURE — 99232 SBSQ HOSP IP/OBS MODERATE 35: CPT | Mod: GC | Performed by: INTERNAL MEDICINE

## 2017-07-04 PROCEDURE — 84100 ASSAY OF PHOSPHORUS: CPT | Performed by: INTERNAL MEDICINE

## 2017-07-04 PROCEDURE — 36415 COLL VENOUS BLD VENIPUNCTURE: CPT | Performed by: STUDENT IN AN ORGANIZED HEALTH CARE EDUCATION/TRAINING PROGRAM

## 2017-07-04 PROCEDURE — 25000131 ZZH RX MED GY IP 250 OP 636 PS 637: Performed by: STUDENT IN AN ORGANIZED HEALTH CARE EDUCATION/TRAINING PROGRAM

## 2017-07-04 PROCEDURE — 25000132 ZZH RX MED GY IP 250 OP 250 PS 637: Performed by: STUDENT IN AN ORGANIZED HEALTH CARE EDUCATION/TRAINING PROGRAM

## 2017-07-04 PROCEDURE — 87040 BLOOD CULTURE FOR BACTERIA: CPT | Performed by: STUDENT IN AN ORGANIZED HEALTH CARE EDUCATION/TRAINING PROGRAM

## 2017-07-04 PROCEDURE — 00000146 ZZHCL STATISTIC GLUCOSE BY METER IP

## 2017-07-04 RX ORDER — FUROSEMIDE 10 MG/ML
20 INJECTION INTRAMUSCULAR; INTRAVENOUS ONCE
Status: COMPLETED | OUTPATIENT
Start: 2017-07-04 | End: 2017-07-04

## 2017-07-04 RX ORDER — FUROSEMIDE 10 MG/ML
40 INJECTION INTRAMUSCULAR; INTRAVENOUS ONCE
Status: COMPLETED | OUTPATIENT
Start: 2017-07-04 | End: 2017-07-04

## 2017-07-04 RX ADMIN — PANTOPRAZOLE SODIUM 40 MG: 40 TABLET, DELAYED RELEASE ORAL at 06:34

## 2017-07-04 RX ADMIN — MEROPENEM 1 G: 1 INJECTION, POWDER, FOR SOLUTION INTRAVENOUS at 21:08

## 2017-07-04 RX ADMIN — FUROSEMIDE 20 MG: 10 INJECTION, SOLUTION INTRAVENOUS at 16:43

## 2017-07-04 RX ADMIN — Medication: at 13:41

## 2017-07-04 RX ADMIN — CALCIUM CARBONATE 600 MG (1,500 MG)-VITAMIN D3 400 UNIT TABLET 2 TABLET: at 09:03

## 2017-07-04 RX ADMIN — MEROPENEM 1 G: 1 INJECTION, POWDER, FOR SOLUTION INTRAVENOUS at 06:34

## 2017-07-04 RX ADMIN — FOLIC ACID 1 MG: 1 TABLET ORAL at 09:04

## 2017-07-04 RX ADMIN — MIDODRINE HYDROCHLORIDE 10 MG: 5 TABLET ORAL at 18:45

## 2017-07-04 RX ADMIN — MIDODRINE HYDROCHLORIDE 10 MG: 5 TABLET ORAL at 09:03

## 2017-07-04 RX ADMIN — MICONAZOLE NITRATE: 2 POWDER TOPICAL at 09:04

## 2017-07-04 RX ADMIN — Medication 30 MG: at 09:03

## 2017-07-04 RX ADMIN — ACETAMINOPHEN 650 MG: 325 TABLET, FILM COATED ORAL at 23:55

## 2017-07-04 RX ADMIN — MICONAZOLE NITRATE: 2 POWDER TOPICAL at 20:35

## 2017-07-04 RX ADMIN — GABAPENTIN 200 MG: 100 CAPSULE ORAL at 09:04

## 2017-07-04 RX ADMIN — INSULIN ASPART 1 UNITS: 100 INJECTION, SOLUTION INTRAVENOUS; SUBCUTANEOUS at 18:46

## 2017-07-04 RX ADMIN — SODIUM PHOSPHATE, MONOBASIC, MONOHYDRATE AND SODIUM PHOSPHATE, DIBASIC, ANHYDROUS 15 MMOL: 276; 142 INJECTION, SOLUTION INTRAVENOUS at 02:14

## 2017-07-04 RX ADMIN — MIDODRINE HYDROCHLORIDE 10 MG: 5 TABLET ORAL at 12:24

## 2017-07-04 RX ADMIN — GABAPENTIN 200 MG: 100 CAPSULE ORAL at 21:08

## 2017-07-04 RX ADMIN — DIGOXIN 125 MCG: 125 TABLET ORAL at 09:03

## 2017-07-04 RX ADMIN — MEROPENEM 1 G: 1 INJECTION, POWDER, FOR SOLUTION INTRAVENOUS at 13:41

## 2017-07-04 RX ADMIN — APIXABAN 2.5 MG: 2.5 TABLET, FILM COATED ORAL at 21:08

## 2017-07-04 RX ADMIN — FUROSEMIDE 40 MG: 10 INJECTION, SOLUTION INTRAVENOUS at 12:11

## 2017-07-04 RX ADMIN — PREDNISONE 7.5 MG: 5 TABLET ORAL at 09:03

## 2017-07-04 RX ADMIN — POTASSIUM CHLORIDE 20 MEQ: 1.5 POWDER, FOR SOLUTION ORAL at 12:11

## 2017-07-04 RX ADMIN — MULTIPLE VITAMINS W/ MINERALS TAB 1 TABLET: TAB at 09:04

## 2017-07-04 RX ADMIN — APIXABAN 2.5 MG: 2.5 TABLET, FILM COATED ORAL at 09:03

## 2017-07-04 RX ADMIN — ACETAMINOPHEN 650 MG: 325 TABLET, FILM COATED ORAL at 05:06

## 2017-07-04 RX ADMIN — ATENOLOL 50 MG: 50 TABLET ORAL at 08:59

## 2017-07-04 RX ADMIN — POTASSIUM CHLORIDE 20 MEQ: 1.5 POWDER, FOR SOLUTION ORAL at 21:08

## 2017-07-04 RX ADMIN — GABAPENTIN 200 MG: 100 CAPSULE ORAL at 13:41

## 2017-07-04 NOTE — PLAN OF CARE
Problem: Goal Outcome Summary  Goal: Goal Outcome Summary  PT/6c: Pt made significant progress in therapy today! Pt progressed to performing STS transfers w/ min A-CGA (pending surface height). Pt progressed to ambulating 22 ft x2 w/ WW w/ CGA, w/c follow. Pt limited by fatigue, mild MONTALVO (on 1LPM O2), weakness, and significant edema. Advise RN staff to assist pt w/ bed <>chair transfers w/ use of gait belt and wheel walker at this time rather than ceiling lift.   Continue to recommend pt disch to ARU to maximize her functional recovery and IND. Pt highly motivated, was previously fully IND.

## 2017-07-04 NOTE — PROGRESS NOTES
Calorie Counts      Intake recorded for: 7/3/17                                     Kcals: 955                                      Protein: 64g      # Meals recorded: 3 meals (First- 100% cheddar cheese omelet, fresh fruit cup, and unstweetened iced tea)      (Second- 100% turkey sandwich with lettuce and tomato, fresh fruit cup, and unsweetened iced tea)      (Third- 100% cottage cheese, garden vegetable soup, and unsweetened iced tea)      # Supplements recorded: 0

## 2017-07-04 NOTE — PROGRESS NOTES
Cardiology Daily Note   Date of Service: 6/30/2017  Patient: Amelia Michel  MRN: [id number]  Admission Date: 6/19/2017  Hospital Day # 15    Assessment & Plan:   Amelia Michel is a 56 year old female with PMHx of VSD s/p repair (1969), RA, recently diagnosed atrial fibrillation that was complicated with an OOH cardiac arrest (felt 2/2 long pause with degeneration to VFib while converting Afib to SR with multiple AV prieto agents) who was readmitted from ARU on 6/19 with fever and concern for worsening LUE infection. She is now transferred back to cardiology service as primary on 6/26 for further workup and evaluation of recurrent lactic acidosis, persistent afib and softer blood pressures with tachypnea.         Changes Today:  - gentle diuresis   - continue meropenem     # Volume overload/anasarca:  # HFrEF (EF 50-55%):  # Hypotension:  Patient initially up 35# at admission; diuresis initially challenging, secondary to third spacing of fluid (albumin 1.9), but briskly responded with addition of midodrine and ongoing albumin resuscitation. Right heart cath 6/26 with mildly elevated right and left sided filling presures  - Gentle diuresis today with furosemide     # Atrial fibrillation/flutter with RVR:  RVR in setting of volume overload above.  - Continue dig 125mcg  - Continue atenolol 50mg daily  - Continue apixaban     # Out of hospital arrest:  Pt will need to wear LifeVest until medically appropriate and cleared from infection/wound standpoint for ICD implantation likely 4-6 weeks after discharge per EP (pending hospital course for LUE wound)    # Sepsis, likely secondary to LUE wound cellulitis  Inciting wound secondary to possible IV extravasation during arrest with surrounding erythema consistent with cellulitis. Wound culture on 6/19 grew Enterobacter cloacae complex, Enterococcus faecalis and coagulase negative staphylococcus. Broadened to vancomycin and meropenem given new fevers and  worsening surrounding skin changes. See note dated from 6/30 for further details regarding prior ID and surgery recommendations.   - D/C vancomycin  - Meropenem  - Pain control: Tramadol 25mg q6h prn, Continue morphine gel around edges of wound for pain   - WOCN      # Acute on Chronic Thrombocytopenia:   2/2 hypoproliferative bone marrow. Plts noted to decrease from 104 to as low as 15 during recent hospitalization requiring transfusion w/ chronically low in the 's as outpatient. Possible bactrim is offending agent for exacerbation, and discontinued this medication in the setting of worsening cellulitis.   -- If this is chronic ITP, no need for treatment unless plts drift < 10s if not bleeding  -- Continue to hold RA meds for now  -- Apixaban 2.5 mg BID (held 6/26-6/27, resumed 6/28). This will need to be held 2 days prior to ICD placement after discharge (this will be deferred given acute infection).    # Acute on chronic anemia: Stable  No evidence of overt bleeding (stool light brown). T Bili normal.  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 Now on 10 mg daily, taper to 7.5 mg on 7/1, then to 5 mg daily after 2 weeks if continues to have low disease activity  -- Continue to hold HCQ and MTX until outpatient follow-up  -- Follow-up with [facility] Dr. Conor Cunha in 4-6 weeks after discharge      # Severe Critical Illness Myopathy:   Significant deconditioning w/ median neuropathies most likely localized to the wrists and ulnar neuropathies most likely localized to the elbows secondary to RA.  -- Ongoing extensive PT/OT/SLP      # Malnutrition:   -- HOLDING tube feeds given ongoing loose stools, encourage PO intake  -- Nutrition  "recommendations are to continue Peptamen 1.5 Tube feeds to 40 ml/hr x 11 hours which will provide 440 ml TF, 660 kcals, and 30 g protein daily. TFs may be adjusted pending oral intake. Once kcal counts reveal that patient is consuming at least 900 kcals and 45 g protein daily on average can discontinue.  -- Water flushes as needed.  -- Nutrition on board.     Consulting Services: Nutrition, ID, heart failure service    CODE: Full Code  DVT Ppx: Apixaban resumed 6/28  Diet/Fluids: As above.  Disposition: Pending improvement in above.    Pt's care was discussed with bedside RN and patient during Care Team Rounds.    Patient was discussed and evaluated with Dr. Villarreal.    Kelly Smith  Internal Medicine PGY2  [phone]    ___________________________________________________________________    Subjective & Interval History:     Last 24 hr care team notes reviewed. No acute events overnight. No f/c/s. No difficulty breathing, cough. No diarrhea. No abdominal pain. No change in pain around left arm.   Medications: Reviewed in EPIC. List below for reference    Physical Exam:    Blood pressure 102/65, pulse 127, temperature 98.5  F (36.9  C), temperature source Oral, resp. rate 22, height 1.48 m (4' 10.25\"), weight 83.5 kg (184 lb), SpO2 95 %, not currently breastfeeding.    CONSTITUTIONAL:  Ms. Michel is supine in bed in no apparent distress.  HEENT: MMM. EOMI.   LUNGS: Normal respiratory effort without increased work of breathing. Lungs clear in anterior fields, mild crackles in bases. No wheezes appreciated.  CARDIOVASCULAR: Irregularly irregular, tachycardic w/ no M/R/G.   ABDOMEN: Soft, distended (edematous abdominal wall), NT, BS +ve.   MUSCULOSKELETAL:  Moves all four extremities without difficulty. Diffuse anasarca.  NEURO: A&Ox3, CN II-XII grossly intact, non-focal.     Lines/Tubes: Rectal tube, brand catheter  PICC Triple Lumen 06/26/17 Right Basilic ok to use. (Active)   Site Assessment WDL 6/26/2017  1:29 PM "   External Cath Length (cm) 3 cm 6/26/2017  1:29 PM   Extremity Circumference (cm) 35 cm 6/26/2017  1:29 PM   Dressing Intervention Chlorhexidine patch;Transparent;Securing device;New dressing 6/26/2017  1:29 PM   Dressing Change Due 07/03/17 6/26/2017  1:29 PM   PICC Lumen Assessment Trigger Red;White;Gray 6/26/2017  1:29 PM   Number of days:0       Labs & Studies of Note: Reviewed in Epic  CMP    Recent Labs  Lab 07/04/17  0550 07/03/17  1956 07/03/17  0704 07/02/17  0641 07/01/17  1350 07/01/17  0910  06/28/17  0555    136 140 138  --  137  < > 138   POTASSIUM 3.8 3.9 4.1 4.2 4.0 3.1*  < > 4.2   CHLORIDE 101 102 107 105  --  102  < > 109   CO2 26 28 27 27  --  27  < > 23   ANIONGAP 9 6 6 6  --  8  < > 6   * 149* 106* 118*  --  166*  < > 165*   BUN 29 34* 30 36*  --  37*  < > 43*   CR 0.54 0.55 0.50* 0.66  --  0.72  < > 0.65   GFRESTIMATED >90Non  GFR Calc >90Non  GFR Calc >90Non  GFR Calc >90Non  GFR Calc  --  84  < > >90Non  GFR Calc   GFRESTBLACK >90African American GFR Calc >90African American GFR Calc >90African American GFR Calc >90African American GFR Calc  --  >90African American GFR Calc  < > >90African American GFR Calc   VIKTORIYA 8.1* 8.0* 8.2* 8.1*  --  8.0*  < > 7.4*   MAG 2.4* 1.7  --   --  2.3 1.4*  --   --    PHOS  --  2.4*  --   --   --   --   --   --    PROTTOTAL  --   --   --   --   --   --   --  3.9*   ALBUMIN  --   --   --   --   --   --   --  1.5*   BILITOTAL  --   --   --  0.9  --   --   --  0.8   ALKPHOS  --   --   --   --   --   --   --  126   AST  --   --   --   --   --   --   --  41   ALT  --   --   --   --   --   --   --  50   < > = values in this interval not displayed.    CBC    Recent Labs  Lab 07/04/17  0550 07/03/17  0704 07/02/17  1029 07/01/17  0944   WBC 1.5* 1.8* 1.9* 1.9*   RBC 2.91* 2.60* 2.95* 2.82*   HGB 8.7* 7.8* 8.8* 8.5*   HCT 26.6* 24.1* 26.8* 25.4*   PLT 36* 23* 30* 20*        INR    Recent Labs  Lab 06/28/17  1609   INR 1.31*       Unresulted Labs Ordered in the Past 30 Days of this Admission     Date and Time Order Name Status Description    7/4/2017 0501 Blood culture Preliminary     7/4/2017 0501 Blood culture Preliminary     7/2/2017 1224 Blood culture Preliminary     7/2/2017 1224 Blood culture Preliminary     7/1/2017 1103 Blood culture Preliminary     7/1/2017 1103 Blood culture Preliminary     5/29/2017 1046 Adalimumab Activity and Neutralizing Antibodies: Laboratory Miscellaneous Order In process           Medication List for Reference (delete if desired)  Current Facility-Administered Medications   Medication     meropenem (MERREM) 1 g vial to attach to  mL bag     miconazole (MICATIN; MICRO GUARD) 2 % powder     predniSONE (DELTASONE) tablet 7.5 mg     apixaban ANTICOAGULANT (ELIQUIS) tablet 2.5 mg     midodrine (PROAMATINE) tablet 10 mg     lidocaine (LMX4) kit     calcium carbonate (TUMS) chewable tablet 500 mg     pantoprazole (PROTONIX) EC tablet 40 mg     digoxin (LANOXIN) tablet 125 mcg     ondansetron (ZOFRAN) tablet 4 mg     prochlorperazine (COMPAZINE) injection 5-10 mg     ondansetron (ZOFRAN) injection 4 mg     simethicone (MYLICON) chewable tablet 80 mg     sodium chloride (PF) 0.9% PF flush 10-20 mL     sodium chloride (PF) 0.9% PF flush 10 mL     heparin lock flush 10 UNIT/ML injection 5-10 mL     heparin lock flush 10 UNIT/ML injection 5-10 mL     dextrose 10 % 1,000 mL infusion     atenolol (TENORMIN) tablet 50 mg     folic acid (FOLVITE) tablet 1 mg     gabapentin (NEURONTIN) capsule 200 mg     melatonin tablet 3 mg     morphine 0.1% in solosite topical gel 2 g     multivitamin, therapeutic with minerals (THERA-VIT-M) tablet 1 tablet     triamcinolone (KENALOG) 0.1 % ointment     lidocaine 1 % 1 mL     lidocaine (LMX4) kit     sodium chloride (PF) 0.9% PF flush 3 mL     sodium chloride (PF) 0.9% PF flush 3 mL     medication instruction      acetaminophen (TYLENOL) tablet 650 mg     acetaminophen (TYLENOL) Suppository 650 mg     Patient is already receiving anticoagulation with heparin, enoxaparin (LOVENOX), warfarin (COUMADIN)  or other anticoagulant medication     glucose 40 % gel 15-30 g    Or     dextrose 50 % injection 25-50 mL    Or     glucagon injection 1 mg     insulin aspart (NovoLOG) inj (RAPID ACTING)     insulin aspart (NovoLOG) inj (RAPID ACTING)     naloxone (NARCAN) injection 0.1-0.4 mg     calcium-vitamin D (CALTRATE) 600-400 MG-UNIT per tablet 2 tablet     co-enzyme Q-10 capsule 30 mg     morphine 0.1% in solosite topical gel     potassium chloride SA (K-DUR/KLOR-CON M) CR tablet 20-40 mEq     potassium chloride (KLOR-CON) Packet 20-40 mEq     potassium chloride 10 mEq in 100 mL intermittent infusion     potassium chloride 10 mEq in 100 mL intermittent infusion with 10 mg lidocaine     potassium chloride 20 mEq in 50 mL intermittent infusion     magnesium sulfate 2 g in NS intermittent infusion ([employer]ar[employer] or [facility]V Cmpd)     magnesium sulfate 4 g in 100 mL sterile water (premade)     sodium phosphate 10 mmol in D5W intermittent infusion     sodium phosphate 15 mmol in D5W intermittent infusion     sodium phosphate 20 mmol in D5W intermittent infusion     sodium phosphate 25 mmol in D5W intermittent infusion     melatonin tablet 1 mg     traMADol (ULTRAM) half-tab 25 mg       ATTENDING ATTESTATION:  Patient has been seen and evaluated by me on July 4, 2017. I have reviewed the medications, laboratory, imaging, and other relevant results.  I agree with the above note which I have edited, as necessary.  I have discussed my assessment and plan with the house staff.    Tere Villarreal MD, MS  Staff Cardiologist, [facility]  Pager: [phone]

## 2017-07-04 NOTE — PROGRESS NOTES
[facility] ID Service: Follow Up Note    Patient:  Amelia Michel, Date of birth 1960, Medical record number [id number]  Date of Visit:  July 3, 2017         Assessment and Recommendations:     ASSESSMENT:  1. Fevers, unknown source  2. LUE wound and cellulitis, improving  3. Leukopenia and thrombocytopenia  4. RA on prednisolone taper currently on 7.5 mg daily  5. Recent cardiac arrest and HFrEF (pending ICD placement)  6. H/o ESBL UTI s/p treatment  7. PCN allergy  8. Aflutter/Afib with RVR     RECOMMENDATIONS:  1. Discontinue vancomycin  2. Continue meropenem, broad-spectrum empiric coverage; will deescalate as able based on clinical course  3. Following pending cultures, repeat with new fevers  4. Continue to trend inflammatory markers Q2-3 days  5. Might consider penicillin allergy skin testing, will discuss more with patient     DISCUSSION: This is a 55yo woman with a recent history of cardiac arrhythmia that was initially admitted to hospital after cardiac arrest roughly 1 month ago. She was discharged to ARU 2 weeks ago but was readmitted with worsening LUE wound and fevers. Initial wound thought to be secondary to IV extravasation. The wound grew Enterobacter, Enterococcus and Coag negative Staph and she received broad spectrum antibiotics for this.  Not clear how much cellulitis vs chemical burn. She was recently switched to TMP-SMX after ID consultation.  The wound has been improving steadily. More recent concern is developing fevers 2 days ago. The wound seems somewhat underwhelming as source of infection. It is possible that previous broad spectrum antibiotics for wound were treating occult infection and this was 'uncovered' with deescalation of antibiotics but imaging, as below did not reveal other source. Must also consider non-infectious causes of fever; particularly with h/o RA and current pancytopenia. At this point, given negative blood cultures and no previous history of MRSA, we  would discontinue the vancomycin. Hope to further deescalate or even discontinue antibiotics in coming days, but this will be dictated by clinical course.      Cornelio Danielle MD  ID Staff  [phone]         Interval History:      No fevers in last 48 hours. Doing better overall. No source of fevers identified.    Microbiology: All recent (7/1, 7/2) blood cultures negative         Review of Systems:   CONSTITUTIONAL:  No fevers or chills in last 24 hrs. Last 2 days ago. Still feeling fatigued.  EYES: negative for icterus  ENT:  negative for oral lesions, hearing loss, tinnitus and sore throat  RESPIRATORY:  negative for cough and dyspnea  CARDIOVASCULAR:  negative for chest pain, palpitations  GASTROINTESTINAL:  One loose stool  GENITOURINARY:  negative for dysuria  HEME:  No easy bruising/bleeding  INTEGUMENT:  negative for rash and pruritus  NEURO:  Negative for headache         Current Antimicrobials     Vancomycin 5/29-31, 6/19-23, 7/1-  Meropenem 5/29-6/3, 7/1-  Ertapenem 6/3-4, 6/19-27  Bactrim 6/27-         Physical Exam:   Ranges for vital signs:  Temp:  [97.5  F (36.4  C)-99.7  F (37.6  C)] 98.4  F (36.9  C)  Heart Rate:  [] 92  Resp:  [18-20] 20  BP: ()/(47-83) 109/82  FiO2 (%):  [2 %] 2 %  SpO2:  [96 %-100 %] 97 %    Intake/Output Summary (Last 24 hours) at 07/03/17 2011  Last data filed at 07/03/17 1851   Gross per 24 hour   Intake             1470 ml   Output             4525 ml   Net            -3055 ml     Exam:  GENERAL:  well-developed, well-nourished, sitting in bed in no acute distress.   ENT:  Head is normocephalic, atraumatic. Oropharynx is moist without exudates or ulcers.  EYES:  Eyes have anicteric sclerae.    NECK:  Supple.  LUNGS:  Clear to auscultation.  CARDIOVASCULAR:  Regular rate and rhythm with no murmurs, gallops or rubs.  ABDOMEN:  Normal bowel sounds, soft, nontender.  EXT: Extravasation wound with large area of epidermal loss, slough/necrosis in the middle 10x3 cm, no  discharge  SKIN:  No acute rashes.  Line is in place without any surrounding erythema.  NEUROLOGIC:  Grossly nonfocal.         Laboratory Data:     Creatinine   Date Value Ref Range Status   07/03/2017 0.50 (L) 0.52 - 1.04 mg/dL Final   07/02/2017 0.66 0.52 - 1.04 mg/dL Final   07/01/2017 0.72 0.52 - 1.04 mg/dL Final   06/30/2017 0.70 0.52 - 1.04 mg/dL Final   06/30/2017 0.75 0.52 - 1.04 mg/dL Final     WBC   Date Value Ref Range Status   07/03/2017 1.8 (L) 4.0 - 11.0 10e9/L Final   07/02/2017 1.9 (L) 4.0 - 11.0 10e9/L Final   07/01/2017 1.9 (L) 4.0 - 11.0 10e9/L Final   06/30/2017 2.3 (L) 4.0 - 11.0 10e9/L Final   06/29/2017 3.0 (L) 4.0 - 11.0 10e9/L Final     Hemoglobin   Date Value Ref Range Status   07/03/2017 7.8 (L) 11.7 - 15.7 g/dL Final     Platelet Count   Date Value Ref Range Status   07/03/2017 23 (LL) 150 - 450 10e9/L Final     Comment:     .  Consistent with previous critical result       Sed Rate   Date Value Ref Range Status   06/19/2017 63 (H) 0 - 30 mm/h Final   05/30/2017 34 (H) 0 - 30 mm/h Final   05/29/2017 22 0 - 30 mm/h Final   03/20/2016 29 0 - 30 mm/h Final     CRP Inflammation   Date Value Ref Range Status   07/02/2017 69.0 (H) 0.0 - 8.0 mg/L Final   06/20/2017 110.0 (H) 0.0 - 8.0 mg/L Final   06/19/2017 98.0 (H) 0.0 - 8.0 mg/L Final   06/04/2017 23.0 (H) 0.0 - 8.0 mg/L Final   06/03/2017 29.0 (H) 0.0 - 8.0 mg/L Final     AST   Date Value Ref Range Status   06/28/2017 41 0 - 45 U/L Final   06/27/2017 54 (H) 0 - 45 U/L Final   06/19/2017 41 0 - 45 U/L Final   06/16/2017 34 0 - 45 U/L Final   06/15/2017 31 0 - 45 U/L Final     ALT   Date Value Ref Range Status   06/28/2017 50 0 - 50 U/L Final   06/27/2017 60 (H) 0 - 50 U/L Final   06/19/2017 63 (H) 0 - 50 U/L Final   06/16/2017 60 (H) 0 - 50 U/L Final   06/15/2017 73 (H) 0 - 50 U/L Final     Bilirubin Total   Date Value Ref Range Status   07/02/2017 0.9 0.2 - 1.3 mg/dL Final   06/28/2017 0.8 0.2 - 1.3 mg/dL Final   06/27/2017 0.6 0.2 - 1.3  mg/dL Final   06/19/2017 0.7 0.2 - 1.3 mg/dL Final   06/16/2017 0.8 0.2 - 1.3 mg/dL Final     Lab Results   Component Value Date     07/03/2017    BUN 30 07/03/2017    CO2 27 07/03/2017

## 2017-07-04 NOTE — PLAN OF CARE
Problem: Sepsis (Adult)  Goal: Signs and Symptoms of Listed Potential Problems Will be Absent or Manageable (Sepsis)  Signs and symptoms of listed potential problems will be absent or manageable by discharge/transition of care (reference Sepsis (Adult) CPG).   Outcome: Improving  Patient A&O X 4. VSS, Afib HR 90-130s, no fevers during shift. Denies any pain, dizziness, lightheadedness. Left arm extravasation site dressing redressed, morphine creams applied to borders. Potassium replaced per protocol. One time dose of IV lasix given, excellent UOP. Some bowel incontinence. Per PT, okay to stand and pivot to use commode with walker and gait belt. Fair PO intake, continues on jesse counts through 7/5. Will continue to monitor and notify team of any questions or concerns.         07/04/17 1439   Sepsis   Problems Assessed (Sepsis) all   Problems Present (Sepsis) other (see comments)  (low WBC count)

## 2017-07-04 NOTE — PLAN OF CARE
Problem: Goal Outcome Summary  Goal: Goal Outcome Summary  OT6C: CX: Pt reported she had too much fatigue to participate this pm.

## 2017-07-04 NOTE — PLAN OF CARE
Problem: Goal Outcome Summary  Goal: Goal Outcome Summary  Outcome: No Change  Pt AFib with RVR, HR's up to 129 and T max=100.2. Lactic drawn=1.6. Mg replaced with 4gm, K replaced with one time order, phos replaced per protocol. Pt with Huynh and good UO. No c/o pain. Pt turns well from side to side, up to chair with lift. Scheduled abx via PICC. Lft LE dressing cdi. Pt uses bedpan, loose stool x1 overnight. Cards cross cover, Dr. Kelly Smith to bedside for assessment. Blood cultures drawn. Orders to give morning doses of meds early. Pt without pain, no complaints, A&O and calls appropriately. Continue to monitor, follow poc, alert C1 with changes and/or concerns.

## 2017-07-05 ENCOUNTER — APPOINTMENT (OUTPATIENT)
Dept: OCCUPATIONAL THERAPY | Facility: CLINIC | Age: 57
DRG: 871 | End: 2017-07-05
Attending: INTERNAL MEDICINE
Payer: COMMERCIAL

## 2017-07-05 ENCOUNTER — APPOINTMENT (OUTPATIENT)
Dept: ULTRASOUND IMAGING | Facility: CLINIC | Age: 57
DRG: 871 | End: 2017-07-05
Attending: INTERNAL MEDICINE
Payer: COMMERCIAL

## 2017-07-05 ENCOUNTER — APPOINTMENT (OUTPATIENT)
Dept: PHYSICAL THERAPY | Facility: CLINIC | Age: 57
DRG: 871 | End: 2017-07-05
Attending: INTERNAL MEDICINE
Payer: COMMERCIAL

## 2017-07-05 LAB
ANION GAP SERPL CALCULATED.3IONS-SCNC: 6 MMOL/L (ref 3–14)
ANION GAP SERPL CALCULATED.3IONS-SCNC: 7 MMOL/L (ref 3–14)
BASOPHILS # BLD AUTO: 0 10E9/L (ref 0–0.2)
BASOPHILS NFR BLD AUTO: 1.4 %
BUN SERPL-MCNC: 25 MG/DL (ref 7–30)
BUN SERPL-MCNC: 26 MG/DL (ref 7–30)
CALCIUM SERPL-MCNC: 8 MG/DL (ref 8.5–10.1)
CALCIUM SERPL-MCNC: 8.1 MG/DL (ref 8.5–10.1)
CHLORIDE SERPL-SCNC: 101 MMOL/L (ref 94–109)
CHLORIDE SERPL-SCNC: 101 MMOL/L (ref 94–109)
CO2 SERPL-SCNC: 29 MMOL/L (ref 20–32)
CO2 SERPL-SCNC: 30 MMOL/L (ref 20–32)
CREAT SERPL-MCNC: 0.57 MG/DL (ref 0.52–1.04)
CREAT SERPL-MCNC: 0.59 MG/DL (ref 0.52–1.04)
DIFFERENTIAL METHOD BLD: ABNORMAL
EOSINOPHIL # BLD AUTO: 0 10E9/L (ref 0–0.7)
EOSINOPHIL NFR BLD AUTO: 2.1 %
ERYTHROCYTE [DISTWIDTH] IN BLOOD BY AUTOMATED COUNT: 22.5 % (ref 10–15)
FERRITIN SERPL-MCNC: 3622 NG/ML (ref 8–252)
GFR SERPL CREATININE-BSD FRML MDRD: ABNORMAL ML/MIN/1.7M2
GFR SERPL CREATININE-BSD FRML MDRD: ABNORMAL ML/MIN/1.7M2
GLUCOSE BLDC GLUCOMTR-MCNC: 122 MG/DL (ref 70–99)
GLUCOSE BLDC GLUCOMTR-MCNC: 126 MG/DL (ref 70–99)
GLUCOSE BLDC GLUCOMTR-MCNC: 176 MG/DL (ref 70–99)
GLUCOSE BLDC GLUCOMTR-MCNC: 94 MG/DL (ref 70–99)
GLUCOSE BLDC GLUCOMTR-MCNC: 96 MG/DL (ref 70–99)
GLUCOSE SERPL-MCNC: 114 MG/DL (ref 70–99)
GLUCOSE SERPL-MCNC: 130 MG/DL (ref 70–99)
HCT VFR BLD AUTO: 21.8 % (ref 35–47)
HGB BLD-MCNC: 7.2 G/DL (ref 11.7–15.7)
IMM GRANULOCYTES # BLD: 0 10E9/L (ref 0–0.4)
IMM GRANULOCYTES NFR BLD: 0 %
LACTATE BLD-SCNC: 1.9 MMOL/L (ref 0.7–2.1)
LACTATE BLD-SCNC: 2.5 MMOL/L (ref 0.7–2.1)
LACTATE BLD-SCNC: 2.5 MMOL/L (ref 0.7–2.1)
LYMPHOCYTES # BLD AUTO: 0.4 10E9/L (ref 0.8–5.3)
LYMPHOCYTES NFR BLD AUTO: 26.1 %
MCH RBC QN AUTO: 30.4 PG (ref 26.5–33)
MCHC RBC AUTO-ENTMCNC: 33 G/DL (ref 31.5–36.5)
MCV RBC AUTO: 92 FL (ref 78–100)
MONOCYTES # BLD AUTO: 0.1 10E9/L (ref 0–1.3)
MONOCYTES NFR BLD AUTO: 9.9 %
NEUTROPHILS # BLD AUTO: 0.9 10E9/L (ref 1.6–8.3)
NEUTROPHILS NFR BLD AUTO: 60.5 %
NRBC # BLD AUTO: 0 10*3/UL
NRBC BLD AUTO-RTO: 0 /100
PLATELET # BLD AUTO: 27 10E9/L (ref 150–450)
POTASSIUM SERPL-SCNC: 3.7 MMOL/L (ref 3.4–5.3)
POTASSIUM SERPL-SCNC: 4 MMOL/L (ref 3.4–5.3)
RBC # BLD AUTO: 2.37 10E12/L (ref 3.8–5.2)
SODIUM SERPL-SCNC: 136 MMOL/L (ref 133–144)
SODIUM SERPL-SCNC: 137 MMOL/L (ref 133–144)
WBC # BLD AUTO: 1.4 10E9/L (ref 4–11)

## 2017-07-05 PROCEDURE — 25000132 ZZH RX MED GY IP 250 OP 250 PS 637: Performed by: NURSE PRACTITIONER

## 2017-07-05 PROCEDURE — 93970 EXTREMITY STUDY: CPT | Mod: XS

## 2017-07-05 PROCEDURE — P9047 ALBUMIN (HUMAN), 25%, 50ML: HCPCS | Performed by: STUDENT IN AN ORGANIZED HEALTH CARE EDUCATION/TRAINING PROGRAM

## 2017-07-05 PROCEDURE — 36592 COLLECT BLOOD FROM PICC: CPT | Performed by: STUDENT IN AN ORGANIZED HEALTH CARE EDUCATION/TRAINING PROGRAM

## 2017-07-05 PROCEDURE — 25000128 H RX IP 250 OP 636: Performed by: STUDENT IN AN ORGANIZED HEALTH CARE EDUCATION/TRAINING PROGRAM

## 2017-07-05 PROCEDURE — 82728 ASSAY OF FERRITIN: CPT | Performed by: INTERNAL MEDICINE

## 2017-07-05 PROCEDURE — 25000132 ZZH RX MED GY IP 250 OP 250 PS 637: Performed by: STUDENT IN AN ORGANIZED HEALTH CARE EDUCATION/TRAINING PROGRAM

## 2017-07-05 PROCEDURE — 40000193 ZZH STATISTIC PT WARD VISIT: Performed by: REHABILITATION PRACTITIONER

## 2017-07-05 PROCEDURE — 97530 THERAPEUTIC ACTIVITIES: CPT | Mod: GP | Performed by: REHABILITATION PRACTITIONER

## 2017-07-05 PROCEDURE — 25000131 ZZH RX MED GY IP 250 OP 636 PS 637: Performed by: STUDENT IN AN ORGANIZED HEALTH CARE EDUCATION/TRAINING PROGRAM

## 2017-07-05 PROCEDURE — 40000133 ZZH STATISTIC OT WARD VISIT: Performed by: OCCUPATIONAL THERAPIST

## 2017-07-05 PROCEDURE — 25000132 ZZH RX MED GY IP 250 OP 250 PS 637: Performed by: INTERNAL MEDICINE

## 2017-07-05 PROCEDURE — 97535 SELF CARE MNGMENT TRAINING: CPT | Mod: GO | Performed by: OCCUPATIONAL THERAPIST

## 2017-07-05 PROCEDURE — 40000133 ZZH STATISTIC OT WARD VISIT

## 2017-07-05 PROCEDURE — 80048 BASIC METABOLIC PNL TOTAL CA: CPT | Performed by: STUDENT IN AN ORGANIZED HEALTH CARE EDUCATION/TRAINING PROGRAM

## 2017-07-05 PROCEDURE — 25000125 ZZHC RX 250: Performed by: STUDENT IN AN ORGANIZED HEALTH CARE EDUCATION/TRAINING PROGRAM

## 2017-07-05 PROCEDURE — 97602 WOUND(S) CARE NON-SELECTIVE: CPT

## 2017-07-05 PROCEDURE — 00000146 ZZHCL STATISTIC GLUCOSE BY METER IP

## 2017-07-05 PROCEDURE — 97110 THERAPEUTIC EXERCISES: CPT | Mod: GO

## 2017-07-05 PROCEDURE — 97535 SELF CARE MNGMENT TRAINING: CPT | Mod: GO

## 2017-07-05 PROCEDURE — 21400006 ZZH R&B CCU INTERMEDIATE UMMC

## 2017-07-05 PROCEDURE — 97116 GAIT TRAINING THERAPY: CPT | Mod: GP | Performed by: REHABILITATION PRACTITIONER

## 2017-07-05 PROCEDURE — 25000128 H RX IP 250 OP 636: Performed by: INTERNAL MEDICINE

## 2017-07-05 PROCEDURE — 83605 ASSAY OF LACTIC ACID: CPT | Performed by: STUDENT IN AN ORGANIZED HEALTH CARE EDUCATION/TRAINING PROGRAM

## 2017-07-05 PROCEDURE — 83605 ASSAY OF LACTIC ACID: CPT | Performed by: INTERNAL MEDICINE

## 2017-07-05 PROCEDURE — 85025 COMPLETE CBC W/AUTO DIFF WBC: CPT | Performed by: STUDENT IN AN ORGANIZED HEALTH CARE EDUCATION/TRAINING PROGRAM

## 2017-07-05 PROCEDURE — 36592 COLLECT BLOOD FROM PICC: CPT | Performed by: INTERNAL MEDICINE

## 2017-07-05 PROCEDURE — 80048 BASIC METABOLIC PNL TOTAL CA: CPT | Performed by: INTERNAL MEDICINE

## 2017-07-05 PROCEDURE — 99232 SBSQ HOSP IP/OBS MODERATE 35: CPT | Mod: GC | Performed by: INTERNAL MEDICINE

## 2017-07-05 PROCEDURE — 25000132 ZZH RX MED GY IP 250 OP 250 PS 637

## 2017-07-05 PROCEDURE — 40000802 ZZH SITE CHECK

## 2017-07-05 PROCEDURE — 93970 EXTREMITY STUDY: CPT

## 2017-07-05 RX ORDER — FUROSEMIDE 10 MG/ML
40 INJECTION INTRAMUSCULAR; INTRAVENOUS ONCE
Status: COMPLETED | OUTPATIENT
Start: 2017-07-05 | End: 2017-07-05

## 2017-07-05 RX ORDER — METHYLPREDNISOLONE SODIUM SUCCINATE 125 MG/2ML
125 INJECTION, POWDER, LYOPHILIZED, FOR SOLUTION INTRAMUSCULAR; INTRAVENOUS
Status: DISCONTINUED | OUTPATIENT
Start: 2017-07-05 | End: 2017-07-05

## 2017-07-05 RX ORDER — ALBUMIN (HUMAN) 12.5 G/50ML
50 SOLUTION INTRAVENOUS ONCE
Status: COMPLETED | OUTPATIENT
Start: 2017-07-05 | End: 2017-07-06

## 2017-07-05 RX ORDER — ALBUTEROL SULFATE 90 UG/1
1-2 AEROSOL, METERED RESPIRATORY (INHALATION)
Status: DISCONTINUED | OUTPATIENT
Start: 2017-07-05 | End: 2017-07-05

## 2017-07-05 RX ORDER — SODIUM CHLORIDE 9 MG/ML
INJECTION, SOLUTION INTRAVENOUS CONTINUOUS PRN
Status: DISCONTINUED | OUTPATIENT
Start: 2017-07-05 | End: 2017-07-05

## 2017-07-05 RX ORDER — MEPERIDINE HYDROCHLORIDE 25 MG/ML
25 INJECTION INTRAMUSCULAR; INTRAVENOUS; SUBCUTANEOUS EVERY 30 MIN PRN
Status: DISCONTINUED | OUTPATIENT
Start: 2017-07-05 | End: 2017-07-05

## 2017-07-05 RX ORDER — ALBUTEROL SULFATE 0.83 MG/ML
2.5 SOLUTION RESPIRATORY (INHALATION)
Status: DISCONTINUED | OUTPATIENT
Start: 2017-07-05 | End: 2017-07-05

## 2017-07-05 RX ORDER — DIPHENHYDRAMINE HYDROCHLORIDE 50 MG/ML
50 INJECTION INTRAMUSCULAR; INTRAVENOUS
Status: DISCONTINUED | OUTPATIENT
Start: 2017-07-05 | End: 2017-07-05

## 2017-07-05 RX ORDER — ALBUMIN (HUMAN) 12.5 G/50ML
50 SOLUTION INTRAVENOUS ONCE
Status: COMPLETED | OUTPATIENT
Start: 2017-07-05 | End: 2017-07-05

## 2017-07-05 RX ADMIN — MEROPENEM 1 G: 1 INJECTION, POWDER, FOR SOLUTION INTRAVENOUS at 06:00

## 2017-07-05 RX ADMIN — MIDODRINE HYDROCHLORIDE 10 MG: 5 TABLET ORAL at 17:40

## 2017-07-05 RX ADMIN — FOLIC ACID 1 MG: 1 TABLET ORAL at 08:00

## 2017-07-05 RX ADMIN — PANTOPRAZOLE SODIUM 40 MG: 40 TABLET, DELAYED RELEASE ORAL at 06:00

## 2017-07-05 RX ADMIN — MIDODRINE HYDROCHLORIDE 10 MG: 5 TABLET ORAL at 11:06

## 2017-07-05 RX ADMIN — MIDODRINE HYDROCHLORIDE 10 MG: 5 TABLET ORAL at 07:59

## 2017-07-05 RX ADMIN — ACETAMINOPHEN 650 MG: 325 TABLET, FILM COATED ORAL at 21:05

## 2017-07-05 RX ADMIN — POTASSIUM CHLORIDE 20 MEQ: 750 TABLET, EXTENDED RELEASE ORAL at 11:06

## 2017-07-05 RX ADMIN — APIXABAN 2.5 MG: 2.5 TABLET, FILM COATED ORAL at 08:00

## 2017-07-05 RX ADMIN — PREDNISONE 7.5 MG: 5 TABLET ORAL at 07:59

## 2017-07-05 RX ADMIN — Medication 30 MG: at 08:00

## 2017-07-05 RX ADMIN — APIXABAN 2.5 MG: 2.5 TABLET, FILM COATED ORAL at 21:05

## 2017-07-05 RX ADMIN — ATENOLOL 50 MG: 50 TABLET ORAL at 08:00

## 2017-07-05 RX ADMIN — INSULIN ASPART 1 UNITS: 100 INJECTION, SOLUTION INTRAVENOUS; SUBCUTANEOUS at 12:34

## 2017-07-05 RX ADMIN — GABAPENTIN 200 MG: 100 CAPSULE ORAL at 08:00

## 2017-07-05 RX ADMIN — MICONAZOLE NITRATE: 2 POWDER TOPICAL at 21:05

## 2017-07-05 RX ADMIN — GABAPENTIN 200 MG: 100 CAPSULE ORAL at 15:30

## 2017-07-05 RX ADMIN — ALBUMIN (HUMAN) 50 G: 12.5 SOLUTION INTRAVENOUS at 02:53

## 2017-07-05 RX ADMIN — TRIAMCINOLONE ACETONIDE: 1 OINTMENT TOPICAL at 08:00

## 2017-07-05 RX ADMIN — MULTIPLE VITAMINS W/ MINERALS TAB 1 TABLET: TAB at 08:00

## 2017-07-05 RX ADMIN — Medication 2 G: at 15:30

## 2017-07-05 RX ADMIN — FUROSEMIDE 40 MG: 10 INJECTION, SOLUTION INTRAVENOUS at 11:06

## 2017-07-05 RX ADMIN — GABAPENTIN 200 MG: 100 CAPSULE ORAL at 21:06

## 2017-07-05 RX ADMIN — POTASSIUM CHLORIDE 20 MEQ: 750 TABLET, EXTENDED RELEASE ORAL at 21:08

## 2017-07-05 RX ADMIN — MICONAZOLE NITRATE: 2 POWDER TOPICAL at 08:00

## 2017-07-05 RX ADMIN — CALCIUM CARBONATE 600 MG (1,500 MG)-VITAMIN D3 400 UNIT TABLET 2 TABLET: at 07:59

## 2017-07-05 RX ADMIN — ACETAMINOPHEN 650 MG: 325 TABLET, FILM COATED ORAL at 06:02

## 2017-07-05 RX ADMIN — DIGOXIN 125 MCG: 125 TABLET ORAL at 08:00

## 2017-07-05 NOTE — PROGRESS NOTES
CLINICAL NUTRITION SERVICES     Nutrition Prescription    Recommendations already ordered by Registered Dietitian (RD):  Discontinued TF orders and free water flushes    Future/Additional Recommendations:  For all recommendations, see prior nutrition notes     Kcal counts:  7/3    955 kcals and 64 g protein (three meal/s and no supplement/s recorded)  7/4   1101 kcals and 54 g protein (three meal/s and two Beneprotein supplement/s recorded)  7/5   Pending    *  Pt consumed a two-day average of 1028 kcals and 59 g protein daily. Oral intake improving. Not quite meeting estimated needs of 9151-3103 kcals/day (25 - 30 kcals/kg) and 61-77 grams protein/day (1.2 - 1.5 grams of pro/kg).    INTERVENTIONS:  Implementation:  Collaboration with other providers, Enteral Nutrition, Feeding tube flush: Discussed pt with team. No TFs since 7/1 and pt's appetite has improved. Team ok with discontinuation of TFs and per team, may pull feeding tube.     Follow up/Monitoring:  Will continue to follow pt.    Inés Brown, MS, RD, LD, [facility]   6C Pgr:  [phone]

## 2017-07-05 NOTE — PROGRESS NOTES
[facility], [place]   Allergy Assessment and Penicillin Allergy Skin Test (PAST)   Antimicrobial Management Team (AMT) Note                Allergy History:   Question Response   What was the name of agent which caused the event?  Penicillin product (exact agent unknown   When did the reaction occur?  ~4 or 5 years old.   What was the nature of the event? Including symptoms and severity  Patient was being treated for an ear infection. Tongue began to swell and turn red. Shortly after tonsil began to swell as well. No S.O.B. or rash was noted to occur.    What was the course of the reaction?      How long after taking the medication did it take for the symptoms to begin?  Exact time unknown but occurred on the same day.   How was the reaction treated?  Unknown   Did you ever experience symptoms like this before?  Patient previously tolerated penicillin before this reaction.   Can you name other antibiotics you remember taking and tolerated?  No   Have you ever taken drugs similar to penicillin?   Cephalosporins such as Cephalexin (Keflex), Cefepime (Maxipime), Ceftriaxone (Rocephin)?Carbapenems such as Imipenem (Primaxin), Meropenem (Merrem), Etrapenem (Invanz)    Ceftriaxone, Ertapenem, Meropenem, Clindamycin and vancomycin     Procedure was reviewed and discussed with the patient and or family member: Y   The patient met [facility] Penicillin Skin Test Protocol for Penicillin Allergy Skin Testing: Y     Assessment:  Patient was assesed for penicillin allergy and was deemed to be good candidate for PAST. However, current antimicrobial therapy was discontinued prior to PAST assessment. Currently PAST service is available for patients receiving broad spectrum antibiotics inpatient who would benefit from de-escalating to a narrower penicillin based therapy.     Recommendation  1. The penicillin allergy was left in the chart: please see  Other  tab within Allergies  for date of PAST progress note.  2. If patient is initiated on broad spectrum antibiotics in future and a pencillin based regimen is preferred, would recommend adding PAST consult to notify AMT.     Discussed with ID Staff - Shashi PeresD, PGY2 Infectious Disease Pharmacy Resident  Please reach out if questions come up.  Pager: [phone]

## 2017-07-05 NOTE — PROGRESS NOTES
[facility] ID Service: Follow Up Note    Patient:  Amelia Michel, Date of birth 1960, Medical record number [id number]  Date of Visit:  July 5, 2017         Assessment and Recommendations:     ASSESSMENT:  1. Fevers, unknown source  2. LUE wound and cellulitis, improving  3. Leukopenia and thrombocytopenia  4. RA on prednisolone taper currently on 7.5 mg daily  5. Recent cardiac arrest and HFrEF (pending ICD placement)  6. H/o ESBL UTI s/p treatment  7. PCN allergy, listed as child as   8. Aflutter/Afib with RVR     RECOMMENDATIONS:  1. Discontinue meropenem, no clear source for active infection  2. Monitor fevers off of antibiotics  3. Repeat blood cultures with new fevers, try to avoid restarting antibiotics unless appearing septic  4. Continue to trend inflammatory markers Q2-3 days (I ordered for tomorrow morning)  5. Penicillin allergy skin testing, which can be done inpatient (ordered after discussing with patient and team)     DISCUSSION: This is a 57yo woman with a recent history of cardiac arrhythmia that was initially admitted to hospital after cardiac arrest roughly 1 month ago. She was discharged to ARU 2 weeks ago but was readmitted with worsening LUE wound and fevers. Initial wound thought to be secondary to IV extravasation. The wound grew Enterobacter, Enterococcus and Coag negative Staph and she received broad spectrum antibiotics for this.  Not clear how much cellulitis vs chemical burn. She was recently switched to TMP-SMX after ID consultation.  The wound has been improving steadily. More recent concern is fevers 7/1 and 7/2, and recurred again last night. The wound seems underwhelming as source of infection. It is possible that previous broad spectrum antibiotics for wound were treating occult infection and this was 'uncovered' with deescalation of antibiotics but imaging, as below did not reveal other source. Must also consider non-infectious causes of fever; particularly with  h/o RA and current pancytopenia. At this point, given negative blood cultures and no clear source of infection, we would consider stopping antibiotic and observing clinically. If infection is present, this might help uncover.  We have also discussed PCN skin allergy testing while in hospital, the patient would be open to this; this was listed when she was a child and the exact circumstances are somewhat unclear.      Cornelio Danielle MD  ID Staff  [phone]         Interval History:     Fever to 101.5 last night. She feels sick without localizing symptoms during fevers but otherwise has been doing OK.  Blood cultures remain negative.    Microbiology: All recent (7/1, 7/2, 7/4) blood cultures negative, C diff 7/3/17 negative         Review of Systems:   CONSTITUTIONAL:  Fevers last night. Still feeling fatigued.  EYES: negative for icterus  ENT:  negative for oral lesions, hearing loss, tinnitus and sore throat  RESPIRATORY:  negative for cough and dyspnea  CARDIOVASCULAR:  negative for chest pain, palpitations  GASTROINTESTINAL:  Negative for diarrhea and constipation  GENITOURINARY:  negative for dysuria  HEME:  No easy bruising/bleeding  INTEGUMENT:  negative for rash and pruritus  NEURO:  Negative for headache         Current Antimicrobials     Meropenem 5/29-6/3, 7/1-  Vancomycin 5/29-31, 6/19-23, 7/1-73  Ertapenem 6/3-4, 6/19-27  Bactrim 6/27-         Physical Exam:   Ranges for vital signs:  Temp:  [97.9  F (36.6  C)-101.5  F (38.6  C)] 97.9  F (36.6  C)  Heart Rate:  [] 89  Resp:  [16-22] 20  BP: (101-122)/(58-85) 101/64  SpO2:  [91 %-96 %] 91 %    Intake/Output Summary (Last 24 hours) at 07/03/17 2011  Last data filed at 07/03/17 1851   Gross per 24 hour   Intake             1470 ml   Output             4525 ml   Net            -3055 ml     Exam:  GENERAL:  well-developed, well-nourished, sitting in bed in no acute distress.   ENT:  Head is normocephalic, atraumatic. Oropharynx is moist without  exudates or ulcers.  EYES:  Eyes have anicteric sclerae.    NECK:  Supple.  LUNGS:  Clear to auscultation.  CARDIOVASCULAR:  Regular rate and rhythm with no murmurs, gallops or rubs.  ABDOMEN:  Normal bowel sounds, soft, nontender.  EXT: Extravasation wound with large area of epidermal loss, slough/necrosis in the middle 10x3 cm, no discharge  SKIN:  No acute rashes.  Line is in place without any surrounding erythema.  NEUROLOGIC:  Grossly nonfocal.         Laboratory Data:     Creatinine   Date Value Ref Range Status   07/05/2017 0.57 0.52 - 1.04 mg/dL Final   07/04/2017 0.58 0.52 - 1.04 mg/dL Final   07/04/2017 0.54 0.52 - 1.04 mg/dL Final   07/03/2017 0.55 0.52 - 1.04 mg/dL Final   07/03/2017 0.50 (L) 0.52 - 1.04 mg/dL Final     WBC   Date Value Ref Range Status   07/05/2017 1.4 (L) 4.0 - 11.0 10e9/L Final   07/04/2017 1.5 (L) 4.0 - 11.0 10e9/L Final   07/03/2017 1.8 (L) 4.0 - 11.0 10e9/L Final   07/02/2017 1.9 (L) 4.0 - 11.0 10e9/L Final   07/01/2017 1.9 (L) 4.0 - 11.0 10e9/L Final     Hemoglobin   Date Value Ref Range Status   07/05/2017 7.2 (L) 11.7 - 15.7 g/dL Final     Platelet Count   Date Value Ref Range Status   07/05/2017 27 (LL) 150 - 450 10e9/L Final     Comment:     .  Consistent with previous critical result       Sed Rate   Date Value Ref Range Status   06/19/2017 63 (H) 0 - 30 mm/h Final   05/30/2017 34 (H) 0 - 30 mm/h Final   05/29/2017 22 0 - 30 mm/h Final   03/20/2016 29 0 - 30 mm/h Final     CRP Inflammation   Date Value Ref Range Status   07/02/2017 69.0 (H) 0.0 - 8.0 mg/L Final   06/20/2017 110.0 (H) 0.0 - 8.0 mg/L Final   06/19/2017 98.0 (H) 0.0 - 8.0 mg/L Final   06/04/2017 23.0 (H) 0.0 - 8.0 mg/L Final   06/03/2017 29.0 (H) 0.0 - 8.0 mg/L Final     AST   Date Value Ref Range Status   06/28/2017 41 0 - 45 U/L Final   06/27/2017 54 (H) 0 - 45 U/L Final   06/19/2017 41 0 - 45 U/L Final   06/16/2017 34 0 - 45 U/L Final   06/15/2017 31 0 - 45 U/L Final     ALT   Date Value Ref Range Status    06/28/2017 50 0 - 50 U/L Final   06/27/2017 60 (H) 0 - 50 U/L Final   06/19/2017 63 (H) 0 - 50 U/L Final   06/16/2017 60 (H) 0 - 50 U/L Final   06/15/2017 73 (H) 0 - 50 U/L Final     Bilirubin Total   Date Value Ref Range Status   07/02/2017 0.9 0.2 - 1.3 mg/dL Final   06/28/2017 0.8 0.2 - 1.3 mg/dL Final   06/27/2017 0.6 0.2 - 1.3 mg/dL Final   06/19/2017 0.7 0.2 - 1.3 mg/dL Final   06/16/2017 0.8 0.2 - 1.3 mg/dL Final     Lab Results   Component Value Date     07/05/2017    BUN 25 07/05/2017    CO2 29 07/05/2017

## 2017-07-05 NOTE — PLAN OF CARE
Problem: Goal Outcome Summary  Goal: Goal Outcome Summary  OT/CR-6C: CGA using log roll technique for bed mobitliy. Therapist educated pt on using a sockaid, reacher and long-handled sponge for improved IND with LB ADLs. Pt donned and doffed socks using the sockaid with min ROBER. Pt went from STS with CGA. Pt completed 1.5 minutes on the arm ergometer while standing with CGA, session ended at this time due to transport arriving for US.   REC: Discharge to ARU to continue to increase independence in ADLs, functional mobilty and activity tolerance.

## 2017-07-05 NOTE — PLAN OF CARE
Problem: Goal Outcome Summary  Goal: Goal Outcome Summary  Edema 6C: LE ultrasound ordered therefore compression wraps removed and will hold until results are finalized.

## 2017-07-05 NOTE — PROGRESS NOTES
Cardiology Daily Note   Date of Service: 6/30/2017  Patient: Amelia Michel  MRN: [id number]  Admission Date: 6/19/2017  Hospital Day # 16    Assessment & Plan:   Amelia Michel is a 56 year old female with PMHx of VSD s/p repair (1969), RA, recently diagnosed atrial fibrillation that was complicated with an OOH cardiac arrest (felt 2/2 long pause with degeneration to VFib while converting Afib to SR with multiple AV prieto agents) who was readmitted from ARU on 6/19 with fever and concern for worsening LUE infection. She is now transferred back to cardiology service as primary on 6/26 for further workup and evaluation of recurrent lactic acidosis, persistent afib and softer blood pressures with tachypnea.         Changes Today:  - gentle diuresis   - d/c meropenem  - ultrasound for DVT      # Volume overload/anasarca:  # HFrEF (EF 50-55%):  # Hypotension:  Patient initially up 35# at admission; diuresis initially challenging, secondary to third spacing of fluid (albumin 1.9), but briskly responded with addition of midodrine and ongoing albumin resuscitation. Right heart cath 6/26 with mildly elevated right and left sided filling presures  - Gentle diuresis with furosemide (40 mg IV 7/5/17)    # Atrial fibrillation/flutter with RVR:  RVR in setting of volume overload above.  - Continue dig 125mcg  - Continue atenolol 50mg daily  - Continue apixaban     # Out of hospital arrest:  Pt will need to wear LifeVest until medically appropriate and cleared from infection/wound standpoint for ICD implantation likely 4-6 weeks after discharge per EP (pending hospital course for LUE wound)    # Fever of unknown origin  Previously attributes to LUE wound, though patient has continued to have fevers despite broad spectrum antibiotics. ID consulted and feels that unlikely secondary to infection. De-escalated antibiotics and have now discontinued antibiotics and will closely monitor.   - D/C brand   - Ultrasound of  extremities for possible DVT  - Pain control: Tramadol 25mg q6h prn, Continue morphine gel around edges of wound for pain   - WOCN      # Acute on Chronic Thrombocytopenia:   2/2 hypoproliferative bone marrow. Plts noted to decrease from 104 to as low as 15 during recent hospitalization requiring transfusion w/ chronically low in the 's as outpatient. Possible bactrim is offending agent for exacerbation, and discontinued this medication in the setting of worsening cellulitis.   -- If this is chronic ITP, no need for treatment unless plts drift < 10s if not bleeding  -- Continue to hold RA meds for now  -- Apixaban 2.5 mg BID (held 6/26-6/27, resumed 6/28). This will need to be held 2 days prior to ICD placement after discharge (this will be deferred given acute infection).    # Acute on chronic anemia: Stable  No evidence of overt bleeding (stool light brown). T Bili normal.  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 Now on 10 mg daily, taper to 7.5 mg on 7/1, then to 5 mg daily after 2 weeks if continues to have low disease activity  -- Continue to hold HCQ and MTX until outpatient follow-up  -- Follow-up with [facility] clinic Dr. Conor Cunha in 4-6 weeks after discharge      # Severe Critical Illness Myopathy:   Significant deconditioning w/ median neuropathies most likely localized to the wrists and ulnar neuropathies most likely localized to the elbows secondary to RA.  -- Ongoing extensive PT/OT/SLP      # Malnutrition:   -- Discontinued tube feeds due to improved PO intake  -- Remove NG    Consulting Services: Nutrition, ID, heart failure service    CODE: Full Code  DVT Ppx: Apixaban resumed 6/28  Diet/Fluids: As above.  Disposition:  "Pending improvement in above.    Pt's care was discussed with bedside RN and patient during Care Team Rounds.    Patient was discussed and evaluated with Dr. Villarreal.    Kelly Smith  Internal Medicine PGY2  [phone]    ___________________________________________________________________    Subjective & Interval History:     Last 24 hr care team notes reviewed. Patient with episode of fever overnight. Continues to lack localizing symptoms (no cough, abdominal pain, diarrhea, rashes). LUE arm is improving.   Medications: Reviewed in EPIC. List below for reference    Physical Exam:    Blood pressure 101/64, pulse 127, temperature 97.9  F (36.6  C), temperature source Oral, resp. rate 20, height 1.48 m (4' 10.25\"), weight 81.2 kg (179 lb), SpO2 91 %, not currently breastfeeding.    CONSTITUTIONAL:  Ms. Michel is supine in bed in no apparent distress.  HEENT: MMM. EOMI.   LUNGS: Normal respiratory effort without increased work of breathing. Lungs clear in anterior fields, mild crackles in bases. No wheezes appreciated.  CARDIOVASCULAR: Irregularly irregular, tachycardic w/ no M/R/G.   ABDOMEN: Soft, distended (edematous abdominal wall), NT, BS +ve.   MUSCULOSKELETAL:  Moves all four extremities without difficulty. Diffuse anasarca.  NEURO: A&Ox3, CN II-XII grossly intact, non-focal.     Lines/Tubes: Rectal tube, brand catheter  PICC Triple Lumen 06/26/17 Right Basilic ok to use. (Active)   Site Assessment WDL 6/26/2017  1:29 PM   External Cath Length (cm) 3 cm 6/26/2017  1:29 PM   Extremity Circumference (cm) 35 cm 6/26/2017  1:29 PM   Dressing Intervention Chlorhexidine patch;Transparent;Securing device;New dressing 6/26/2017  1:29 PM   Dressing Change Due 07/03/17 6/26/2017  1:29 PM   PICC Lumen Assessment Trigger Red;White;Gray 6/26/2017  1:29 PM   Number of days:0       Labs & Studies of Note: Reviewed in Epic  CMP    Recent Labs  Lab 07/05/17  0654 07/04/17 1940 07/04/17  0550 07/03/17 1956 07/02/17  0641 " 07/01/17  1350 07/01/17  0910    136 136 136  < > 138  --  137   POTASSIUM 3.7 4.0 3.8 3.9  < > 4.2 4.0 3.1*   CHLORIDE 101 100 101 102  < > 105  --  102   CO2 29 28 26 28  < > 27  --  27   ANIONGAP 7 8 9 6  < > 6  --  8   * 191* 100* 149*  < > 118*  --  166*   BUN 25 26 29 34*  < > 36*  --  37*   CR 0.57 0.58 0.54 0.55  < > 0.66  --  0.72   GFRESTIMATED >90Non  GFR Calc >90Non  GFR Calc >90Non  GFR Calc >90Non  GFR Calc  < > >90Non  GFR Calc  --  84   GFRESTBLACK >90African American GFR Calc >90African American GFR Calc >90African American GFR Calc >90African American GFR Calc  < > >90African American GFR Calc  --  >90African American GFR Calc   VIKTORIYA 8.1* 7.7* 8.1* 8.0*  < > 8.1*  --  8.0*   MAG  --   --  2.4* 1.7  --   --  2.3 1.4*   PHOS  --  2.9  --  2.4*  --   --   --   --    BILITOTAL  --   --   --   --   --  0.9  --   --    < > = values in this interval not displayed.    CBC    Recent Labs  Lab 07/05/17  0654 07/04/17  0550 07/03/17  0704 07/02/17  1029   WBC 1.4* 1.5* 1.8* 1.9*   RBC 2.37* 2.91* 2.60* 2.95*   HGB 7.2* 8.7* 7.8* 8.8*   HCT 21.8* 26.6* 24.1* 26.8*   PLT 27* 36* 23* 30*       INR  No lab results found in last 7 days.    Unresulted Labs Ordered in the Past 30 Days of this Admission     Date and Time Order Name Status Description    7/4/2017 0501 Blood culture Preliminary     7/4/2017 0501 Blood culture Preliminary     7/2/2017 1224 Blood culture Preliminary     7/2/2017 1224 Blood culture Preliminary     7/1/2017 1103 Blood culture Preliminary     7/1/2017 1103 Blood culture Preliminary     5/29/2017 1046 Adalimumab Activity and Neutralizing Antibodies: Laboratory Miscellaneous Order In process           Medication List for Reference (delete if desired)  Current Facility-Administered Medications   Medication     miconazole (MICATIN; MICRO GUARD) 2 % powder     predniSONE (DELTASONE) tablet 7.5 mg      apixaban ANTICOAGULANT (ELIQUIS) tablet 2.5 mg     midodrine (PROAMATINE) tablet 10 mg     lidocaine (LMX4) kit     calcium carbonate (TUMS) chewable tablet 500 mg     pantoprazole (PROTONIX) EC tablet 40 mg     digoxin (LANOXIN) tablet 125 mcg     ondansetron (ZOFRAN) tablet 4 mg     prochlorperazine (COMPAZINE) injection 5-10 mg     ondansetron (ZOFRAN) injection 4 mg     simethicone (MYLICON) chewable tablet 80 mg     sodium chloride (PF) 0.9% PF flush 10-20 mL     sodium chloride (PF) 0.9% PF flush 10 mL     heparin lock flush 10 UNIT/ML injection 5-10 mL     heparin lock flush 10 UNIT/ML injection 5-10 mL     dextrose 10 % 1,000 mL infusion     atenolol (TENORMIN) tablet 50 mg     folic acid (FOLVITE) tablet 1 mg     gabapentin (NEURONTIN) capsule 200 mg     melatonin tablet 3 mg     morphine 0.1% in solosite topical gel 2 g     multivitamin, therapeutic with minerals (THERA-VIT-M) tablet 1 tablet     triamcinolone (KENALOG) 0.1 % ointment     lidocaine 1 % 1 mL     lidocaine (LMX4) kit     sodium chloride (PF) 0.9% PF flush 3 mL     sodium chloride (PF) 0.9% PF flush 3 mL     medication instruction     acetaminophen (TYLENOL) tablet 650 mg     acetaminophen (TYLENOL) Suppository 650 mg     Patient is already receiving anticoagulation with heparin, enoxaparin (LOVENOX), warfarin (COUMADIN)  or other anticoagulant medication     glucose 40 % gel 15-30 g    Or     dextrose 50 % injection 25-50 mL    Or     glucagon injection 1 mg     insulin aspart (NovoLOG) inj (RAPID ACTING)     insulin aspart (NovoLOG) inj (RAPID ACTING)     naloxone (NARCAN) injection 0.1-0.4 mg     calcium-vitamin D (CALTRATE) 600-400 MG-UNIT per tablet 2 tablet     co-enzyme Q-10 capsule 30 mg     morphine 0.1% in solosite topical gel     potassium chloride SA (K-DUR/KLOR-CON M) CR tablet 20-40 mEq     potassium chloride (KLOR-CON) Packet 20-40 mEq     potassium chloride 10 mEq in 100 mL intermittent infusion     potassium chloride 10 mEq  in 100 mL intermittent infusion with 10 mg lidocaine     potassium chloride 20 mEq in 50 mL intermittent infusion     magnesium sulfate 2 g in NS intermittent infusion (PharMEDium or FV Cmpd)     magnesium sulfate 4 g in 100 mL sterile water (premade)     sodium phosphate 10 mmol in D5W intermittent infusion     sodium phosphate 15 mmol in D5W intermittent infusion     sodium phosphate 20 mmol in D5W intermittent infusion     sodium phosphate 25 mmol in D5W intermittent infusion     melatonin tablet 1 mg     traMADol (ULTRAM) half-tab 25 mg     ATTENDING ATTESTATION:  Patient has been seen and evaluated by me on July 5, 2017. I have reviewed the medications, laboratory, imaging, and other relevant results.  I agree with the above note which I have edited, as necessary.  I have discussed my assessment and plan with the house staff.    Tere Villarreal MD, MS  Staff Cardiologist, [facility]  Pager: [phone]

## 2017-07-05 NOTE — PROGRESS NOTES
[facility] Nurse Inpatient Wound Assessment     Re Assessment of wound(s) on pt's:   Left arm    Data:     Patient History:      per MD note(s):  56 year old woman with hx of VSD repair (1969), Rheumatoid arthritis, and a recent diagnosis of Afib/Aflutter/SVT, who presented to [facility] on 5/29 after Out of Hospital cardiac arrest with shockable rhythm. After ROSC in the field, she was admitted from 5/29-6/15, admitted to ARU 6/15.  admitted from ARU on 6/19 with fever and concern for worsening LUE infection.  She is now transferred back to cardiology service as primary on 6/26 for further workup and evaluation of recurrent lactic acidosis, persistent afib and softer blood pressures with tachypnea     left arm extravasation site    Moisture Management:  Dressings    Current Diet / Nutrition:         Active Diet Order      Regular Diet Adult Thin Liquids (water, ice chips, juice, milk, gelatin, ice cream, etc)        Tube feeding       Jay Score  Avg: 15.6  Min: 9  Max: 23     Recent Labs   Lab Test  07/05/17   0654   07/02/17   0641   06/28/17   1609  06/28/17   0555   05/31/17   1018   ALBUMIN   --    --    --    --    --   1.5*   < >  1.9*   HGB  7.2*   < >   --    < >  9.2*  6.9*   < >  8.3*   INR   --    --    --    --   1.31*   --    < >  3.38*   WBC  1.4*   < >   --    < >  3.0*  2.4*   < >  7.5   A1C   --    --    --    --    --    --    --   Canceled, Test credited   UNABLE TO CALCULATE % HBA1C DUE TO LOW HGB AND/OR LOW HGBA1C  NOTIFIED CHELSEA BEE RN AT 1253 ON 5/31/17 [facility]     CRP   --    --   69.0*   --    --    --    < >   --     < > = values in this interval not displayed.       Wound Assessment :   Left arm  Wound History:  Extravasation wound with large area of epidermal loss                     6/28/17 7/5/17      Wound Base: 90% adherent tan slough/necrosis surrounded by 10% thin yellow slough.  New epithelium at  edges    Specific Dimensions (length x width x depth, in cm) :   10 x 2.8 x 0.1 cm     Palpation of the wound bed:  edema    Periwound Skin: intact, minimal pink erythema     Drainage:  . Amount: moderate . Color: serous     Odor: none  Pain:  Minimal, increases without the use of morphine gel.        Intervention:     Patient's chart evaluated.      Wound(s) was assessed    Wound Care: was done: changed dressing, Visual inspection    Orders revised    Discussed plan of care with  Patient and nurse, including possibility of more aggressive debridement of necrotic tissue.            Assessment:       Large area of epidermal loss with potential full thickness skin loss at area of necrosis 2/2 extravastion.  Measurements unchanged but slough is loosening.  Decreased erythema and pain  Pt would like to try more aggressive autolytic debridement.  She is aware that this may increase pain          Plan:     Nursing to notify the Provider(s) and re-consult the [facility] Nurse if wound(s) deteriorate(s).    Plan of care for wound located on left arm: Change entire dressing every other day, clean wound and skin around wound with Microklenz.  IN THIS ORDER:    -Apply Cavilon no-sting to periwound skin.    -Apply Morphine gel to wound edges.    -Apply Medihoney nickel thick to the areas with slough.      -Cover with Mepilex border dressings.      [facility] Nurse will return: weekly     Face to face time: 20 minutes

## 2017-07-05 NOTE — PLAN OF CARE
Problem: Goal Outcome Summary  Goal: Goal Outcome Summary  PT/6C     Pt progressed ambulation to 100' w/ FWW and CGA. Pt needing mod A x1 for supine<>sit EOB. Pt sit<>stand w/ min A-CGA. Pt still limited by edema and fatigue.     Pt's tolerance for multiple therapies has improved.Continue to recommend discharge to ARU to work on functional independence and endurance as pt was previously IND and is highly motivated.

## 2017-07-05 NOTE — PLAN OF CARE
Problem: Goal Outcome Summary  Goal: Goal Outcome Summary  Outcome: No Change  Pt re-admit from ARU due to Sepsis. Pt A/Ox4, VSS on 1L O2.Pt in Afib/flutter. MD notified that pt in sustained Aflutter at 130, pt's temp was 101.5F (Tylenol given), Pt's HR unchanged, Albumin given Per MD for LA 2.5. Pt asymptomatic, brand with good urine output. Pt repositioned frequently overnight. LUE dressing intact. ABX given. Continue to monitor and notify MD of questions or concerns.

## 2017-07-05 NOTE — PROGRESS NOTES
Calorie Counts  Intake recorded for: 7/4  Kcals: 1101  Protein: 54g  # Meals Recorded: 2 meals (First - 100% fruit cup, rice chex with 8 oz 1% milk, iced tea with lemon)      (Second - 100% tomato soup, iced tea with lemon, 50% grilled cheese sandwich)      (Third - 100% side salad with Polish dressing, iced tea with lemon, 50% beans and rice)  # Supplements Recorded: 100% 2 8oz 1% milks with 2 packets Beneprotein

## 2017-07-05 NOTE — PLAN OF CARE
Problem: Goal Outcome Summary  Goal: Goal Outcome Summary  Outcome: No Change  VSS, Aflutter 80s-120s.  A&Ox4, on 1 L NC.  No complaints of pain.  Pt up w/2 and a walker to chair and ambulating in the hallway x1.  BUE and BLE ultrasound completed.  LUE wound dressing changed per [facility] nurse.  Huynh removed at 1630.  Pt incontinent of urine at 1830.  IV antibiotics d/c'd.  Oral intake and pulmonary toileting encouraged.  Chalo counts continue through today.  40 mg IV lasix given.  Potassium (3.7) replaced.  Blood sugars assessed and managed with SSI.  Will continue to monitor and notify Cards 1 with any concerns or questions.

## 2017-07-06 ENCOUNTER — APPOINTMENT (OUTPATIENT)
Dept: OCCUPATIONAL THERAPY | Facility: CLINIC | Age: 57
DRG: 871 | End: 2017-07-06
Attending: INTERNAL MEDICINE
Payer: COMMERCIAL

## 2017-07-06 ENCOUNTER — APPOINTMENT (OUTPATIENT)
Dept: MRI IMAGING | Facility: CLINIC | Age: 57
DRG: 871 | End: 2017-07-06
Attending: INTERNAL MEDICINE
Payer: COMMERCIAL

## 2017-07-06 ENCOUNTER — APPOINTMENT (OUTPATIENT)
Dept: SPEECH THERAPY | Facility: CLINIC | Age: 57
DRG: 871 | End: 2017-07-06
Attending: INTERNAL MEDICINE
Payer: COMMERCIAL

## 2017-07-06 LAB
ANION GAP SERPL CALCULATED.3IONS-SCNC: 5 MMOL/L (ref 3–14)
BASOPHILS # BLD AUTO: 0 10E9/L (ref 0–0.2)
BASOPHILS NFR BLD AUTO: 0.6 %
BUN SERPL-MCNC: 23 MG/DL (ref 7–30)
CALCIUM SERPL-MCNC: 8.6 MG/DL (ref 8.5–10.1)
CHLORIDE SERPL-SCNC: 101 MMOL/L (ref 94–109)
CO2 SERPL-SCNC: 31 MMOL/L (ref 20–32)
CREAT SERPL-MCNC: 0.53 MG/DL (ref 0.52–1.04)
CRP SERPL-MCNC: 61 MG/L (ref 0–8)
DIFFERENTIAL METHOD BLD: ABNORMAL
EOSINOPHIL # BLD AUTO: 0 10E9/L (ref 0–0.7)
EOSINOPHIL NFR BLD AUTO: 0 %
ERYTHROCYTE [DISTWIDTH] IN BLOOD BY AUTOMATED COUNT: 22.7 % (ref 10–15)
GFR SERPL CREATININE-BSD FRML MDRD: NORMAL ML/MIN/1.7M2
GLUCOSE SERPL-MCNC: 89 MG/DL (ref 70–99)
HCT VFR BLD AUTO: 25.6 % (ref 35–47)
HGB BLD-MCNC: 8.4 G/DL (ref 11.7–15.7)
IMM GRANULOCYTES # BLD: 0 10E9/L (ref 0–0.4)
IMM GRANULOCYTES NFR BLD: 0 %
LACTATE BLD-SCNC: 1.4 MMOL/L (ref 0.7–2.1)
LYMPHOCYTES # BLD AUTO: 0.3 10E9/L (ref 0.8–5.3)
LYMPHOCYTES NFR BLD AUTO: 19 %
MCH RBC QN AUTO: 30.7 PG (ref 26.5–33)
MCHC RBC AUTO-ENTMCNC: 32.8 G/DL (ref 31.5–36.5)
MCV RBC AUTO: 93 FL (ref 78–100)
MONOCYTES # BLD AUTO: 0.2 10E9/L (ref 0–1.3)
MONOCYTES NFR BLD AUTO: 12.1 %
NEUTROPHILS # BLD AUTO: 1.2 10E9/L (ref 1.6–8.3)
NEUTROPHILS NFR BLD AUTO: 68.3 %
NRBC # BLD AUTO: 0 10*3/UL
NRBC BLD AUTO-RTO: 0 /100
PLATELET # BLD AUTO: 34 10E9/L (ref 150–450)
POTASSIUM SERPL-SCNC: 3.7 MMOL/L (ref 3.4–5.3)
PROCALCITONIN SERPL-MCNC: 0.51 NG/ML
RBC # BLD AUTO: 2.74 10E12/L (ref 3.8–5.2)
RETICS # AUTO: 44.4 10E9/L (ref 25–95)
RETICS/RBC NFR AUTO: 1.6 % (ref 0.5–2)
SODIUM SERPL-SCNC: 137 MMOL/L (ref 133–144)
WBC # BLD AUTO: 1.7 10E9/L (ref 4–11)

## 2017-07-06 PROCEDURE — 25000132 ZZH RX MED GY IP 250 OP 250 PS 637

## 2017-07-06 PROCEDURE — 25000128 H RX IP 250 OP 636: Performed by: INTERNAL MEDICINE

## 2017-07-06 PROCEDURE — 25000132 ZZH RX MED GY IP 250 OP 250 PS 637: Performed by: INTERNAL MEDICINE

## 2017-07-06 PROCEDURE — 40000225 ZZH STATISTIC SLP WARD VISIT

## 2017-07-06 PROCEDURE — 72157 MRI CHEST SPINE W/O & W/DYE: CPT

## 2017-07-06 PROCEDURE — 21400006 ZZH R&B CCU INTERMEDIATE UMMC

## 2017-07-06 PROCEDURE — 25000125 ZZHC RX 250: Performed by: STUDENT IN AN ORGANIZED HEALTH CARE EDUCATION/TRAINING PROGRAM

## 2017-07-06 PROCEDURE — 25000131 ZZH RX MED GY IP 250 OP 636 PS 637: Performed by: STUDENT IN AN ORGANIZED HEALTH CARE EDUCATION/TRAINING PROGRAM

## 2017-07-06 PROCEDURE — 36592 COLLECT BLOOD FROM PICC: CPT | Performed by: INTERNAL MEDICINE

## 2017-07-06 PROCEDURE — 87106 FUNGI IDENTIFICATION YEAST: CPT | Performed by: INTERNAL MEDICINE

## 2017-07-06 PROCEDURE — 72158 MRI LUMBAR SPINE W/O & W/DYE: CPT

## 2017-07-06 PROCEDURE — 97140 MANUAL THERAPY 1/> REGIONS: CPT | Mod: GO | Performed by: OCCUPATIONAL THERAPIST

## 2017-07-06 PROCEDURE — 85045 AUTOMATED RETICULOCYTE COUNT: CPT | Performed by: INTERNAL MEDICINE

## 2017-07-06 PROCEDURE — 25000132 ZZH RX MED GY IP 250 OP 250 PS 637: Performed by: NURSE PRACTITIONER

## 2017-07-06 PROCEDURE — 85025 COMPLETE CBC W/AUTO DIFF WBC: CPT | Performed by: INTERNAL MEDICINE

## 2017-07-06 PROCEDURE — 86645 CMV ANTIBODY IGM: CPT | Performed by: INTERNAL MEDICINE

## 2017-07-06 PROCEDURE — 25000128 H RX IP 250 OP 636: Performed by: RADIOLOGY

## 2017-07-06 PROCEDURE — 86617 LYME DISEASE ANTIBODY: CPT | Performed by: INTERNAL MEDICINE

## 2017-07-06 PROCEDURE — 86644 CMV ANTIBODY: CPT | Performed by: INTERNAL MEDICINE

## 2017-07-06 PROCEDURE — 86665 EPSTEIN-BARR CAPSID VCA: CPT | Performed by: INTERNAL MEDICINE

## 2017-07-06 PROCEDURE — 25000132 ZZH RX MED GY IP 250 OP 250 PS 637: Performed by: STUDENT IN AN ORGANIZED HEALTH CARE EDUCATION/TRAINING PROGRAM

## 2017-07-06 PROCEDURE — 92507 TX SP LANG VOICE COMM INDIV: CPT | Mod: GN

## 2017-07-06 PROCEDURE — P9047 ALBUMIN (HUMAN), 25%, 50ML: HCPCS | Performed by: INTERNAL MEDICINE

## 2017-07-06 PROCEDURE — 87088 URINE BACTERIA CULTURE: CPT | Performed by: INTERNAL MEDICINE

## 2017-07-06 PROCEDURE — 40000611 ZZHCL STATISTIC MORPHOLOGY W/INTERP HEMEPATH TC 85060: Performed by: INTERNAL MEDICINE

## 2017-07-06 PROCEDURE — 36592 COLLECT BLOOD FROM PICC: CPT | Performed by: STUDENT IN AN ORGANIZED HEALTH CARE EDUCATION/TRAINING PROGRAM

## 2017-07-06 PROCEDURE — 25000125 ZZHC RX 250: Performed by: INTERNAL MEDICINE

## 2017-07-06 PROCEDURE — 40000802 ZZH SITE CHECK

## 2017-07-06 PROCEDURE — 87799 DETECT AGENT NOS DNA QUANT: CPT | Performed by: INTERNAL MEDICINE

## 2017-07-06 PROCEDURE — 36415 COLL VENOUS BLD VENIPUNCTURE: CPT | Performed by: INTERNAL MEDICINE

## 2017-07-06 PROCEDURE — 99232 SBSQ HOSP IP/OBS MODERATE 35: CPT | Mod: GC | Performed by: INTERNAL MEDICINE

## 2017-07-06 PROCEDURE — 80048 BASIC METABOLIC PNL TOTAL CA: CPT | Performed by: STUDENT IN AN ORGANIZED HEALTH CARE EDUCATION/TRAINING PROGRAM

## 2017-07-06 PROCEDURE — A9585 GADOBUTROL INJECTION: HCPCS | Performed by: RADIOLOGY

## 2017-07-06 PROCEDURE — 40000133 ZZH STATISTIC OT WARD VISIT: Performed by: OCCUPATIONAL THERAPIST

## 2017-07-06 PROCEDURE — 83605 ASSAY OF LACTIC ACID: CPT | Performed by: INTERNAL MEDICINE

## 2017-07-06 PROCEDURE — 84145 PROCALCITONIN (PCT): CPT | Performed by: STUDENT IN AN ORGANIZED HEALTH CARE EDUCATION/TRAINING PROGRAM

## 2017-07-06 PROCEDURE — 86140 C-REACTIVE PROTEIN: CPT | Performed by: STUDENT IN AN ORGANIZED HEALTH CARE EDUCATION/TRAINING PROGRAM

## 2017-07-06 PROCEDURE — 87040 BLOOD CULTURE FOR BACTERIA: CPT | Performed by: INTERNAL MEDICINE

## 2017-07-06 PROCEDURE — 87086 URINE CULTURE/COLONY COUNT: CPT | Performed by: INTERNAL MEDICINE

## 2017-07-06 PROCEDURE — 72156 MRI NECK SPINE W/O & W/DYE: CPT

## 2017-07-06 RX ORDER — GADOBUTROL 604.72 MG/ML
7.5 INJECTION INTRAVENOUS ONCE
Status: COMPLETED | OUTPATIENT
Start: 2017-07-06 | End: 2017-07-06

## 2017-07-06 RX ORDER — FUROSEMIDE 10 MG/ML
40 INJECTION INTRAMUSCULAR; INTRAVENOUS DAILY
Status: DISCONTINUED | OUTPATIENT
Start: 2017-07-07 | End: 2017-07-13

## 2017-07-06 RX ORDER — FUROSEMIDE 10 MG/ML
40 INJECTION INTRAMUSCULAR; INTRAVENOUS ONCE
Status: COMPLETED | OUTPATIENT
Start: 2017-07-06 | End: 2017-07-06

## 2017-07-06 RX ADMIN — APIXABAN 2.5 MG: 2.5 TABLET, FILM COATED ORAL at 21:03

## 2017-07-06 RX ADMIN — MIDODRINE HYDROCHLORIDE 10 MG: 5 TABLET ORAL at 12:40

## 2017-07-06 RX ADMIN — DIGOXIN 125 MCG: 125 TABLET ORAL at 08:30

## 2017-07-06 RX ADMIN — MULTIPLE VITAMINS W/ MINERALS TAB 1 TABLET: TAB at 08:34

## 2017-07-06 RX ADMIN — Medication: at 14:56

## 2017-07-06 RX ADMIN — SODIUM CHLORIDE, PRESERVATIVE FREE 5 ML: 5 INJECTION INTRAVENOUS at 10:49

## 2017-07-06 RX ADMIN — SODIUM CHLORIDE, PRESERVATIVE FREE 5 ML: 5 INJECTION INTRAVENOUS at 13:09

## 2017-07-06 RX ADMIN — ALBUMIN (HUMAN) 50 G: 12.5 SOLUTION INTRAVENOUS at 00:14

## 2017-07-06 RX ADMIN — ATENOLOL 50 MG: 50 TABLET ORAL at 08:34

## 2017-07-06 RX ADMIN — APIXABAN 2.5 MG: 2.5 TABLET, FILM COATED ORAL at 08:35

## 2017-07-06 RX ADMIN — MIDODRINE HYDROCHLORIDE 10 MG: 5 TABLET ORAL at 08:35

## 2017-07-06 RX ADMIN — MICONAZOLE NITRATE: 2 POWDER TOPICAL at 21:03

## 2017-07-06 RX ADMIN — POTASSIUM CHLORIDE 20 MEQ: 750 TABLET, EXTENDED RELEASE ORAL at 12:40

## 2017-07-06 RX ADMIN — PREDNISONE 7.5 MG: 5 TABLET ORAL at 08:33

## 2017-07-06 RX ADMIN — Medication 30 MG: at 08:33

## 2017-07-06 RX ADMIN — ACETAMINOPHEN 650 MG: 325 TABLET, FILM COATED ORAL at 10:09

## 2017-07-06 RX ADMIN — ACETAMINOPHEN 650 MG: 325 TABLET, FILM COATED ORAL at 23:13

## 2017-07-06 RX ADMIN — GADOBUTROL 7.5 ML: 604.72 INJECTION INTRAVENOUS at 23:02

## 2017-07-06 RX ADMIN — CALCIUM CARBONATE 600 MG (1,500 MG)-VITAMIN D3 400 UNIT TABLET 2 TABLET: at 08:35

## 2017-07-06 RX ADMIN — PANTOPRAZOLE SODIUM 40 MG: 40 TABLET, DELAYED RELEASE ORAL at 06:11

## 2017-07-06 RX ADMIN — GABAPENTIN 200 MG: 100 CAPSULE ORAL at 21:03

## 2017-07-06 RX ADMIN — MICONAZOLE NITRATE: 2 POWDER TOPICAL at 18:15

## 2017-07-06 RX ADMIN — GABAPENTIN 200 MG: 100 CAPSULE ORAL at 13:32

## 2017-07-06 RX ADMIN — FUROSEMIDE 40 MG: 10 INJECTION, SOLUTION INTRAVENOUS at 11:09

## 2017-07-06 RX ADMIN — MIDODRINE HYDROCHLORIDE 10 MG: 5 TABLET ORAL at 17:43

## 2017-07-06 RX ADMIN — GABAPENTIN 200 MG: 100 CAPSULE ORAL at 08:33

## 2017-07-06 RX ADMIN — FOLIC ACID 1 MG: 1 TABLET ORAL at 08:33

## 2017-07-06 RX ADMIN — TRAMADOL HYDROCHLORIDE 25 MG: 50 TABLET, FILM COATED ORAL at 21:03

## 2017-07-06 NOTE — PLAN OF CARE
Problem: Goal Outcome Summary  Goal: Goal Outcome Summary  Edema 6C: LE ultrasound negative for DVT therefore compression wraps reapplied in AM. Skin remains intact with soft 2+ pitting up to hips. Application of GCB to R LE from MTPs to mid thigh and L LE from MTPs to knee creases. Will alternate full leg wrapping in order to manage chronic edema while facilitating increased ease in functional mobility. Remove wraps if causing pain/numbness or become soiled.

## 2017-07-06 NOTE — PLAN OF CARE
Problem: Goal Outcome Summary  Goal: Goal Outcome Summary  Outcome: No Change  Pt re-admit from ARU due to Fever, Chills, concern for worsening LUE wound. Pt A/Ox4, VSS on 1L O2.Pt in Afib/flutter. MD notified that pt in sustained Aflutter at 120's, LA ordered (resulted 2.5), Albumin given. Pt switching between Afib/flutter throughout the night. Pt incontinent of urine many times. Repositioned pt frequently overnight. LUE dressing intact. Continue to monitor and notify MD of questions or concerns.

## 2017-07-06 NOTE — PLAN OF CARE
"Problem: Goal Outcome Summary  Goal: Goal Outcome Summary     SLP 6C: Pt seen for language tx. Discussed roles of returning home for example, financial management, cooking (direction following), etc. Pt does not feel limited in cognitive-linguistic abilities at this time for those tasks per her report. However, then stating she feels shes \"not yet back to normal.\" SLP to continue to follow as indicated on POC, likely close to discharge from SLP services.       "

## 2017-07-06 NOTE — PROGRESS NOTES
[facility] ID Service: Follow Up Note    Patient:  Amelia Michel, Date of birth 1960, Medical record number [id number]  Date of Visit: July 6, 2017         Assessment and Recommendations:     ASSESSMENT:  1. Fevers, unknown source  2. LUE wound and cellulitis, improving  3. Leukopenia and thrombocytopenia  4. RA on prednisolone taper currently on 7.5 mg daily  5. Recent cardiac arrest and HFrEF (pending ICD placement)  6. H/o ESBL UTI s/p treatment  7. PCN allergy  8. Aflutter/Afib with RVR     RECOMMENDATIONS:  1. Continue to hold antibiotics for now  2. Repeat blood cultures now (since febrile), and with new fevers while off antibiotics  3. Blood parasite stain, anaplasma and Lyme antibodies  4. CMV and EBV, serology and serum viral loads (DNA)   5. Continue to trend inflammatory markers Q2-3 days  6. Agree with work-up initiated for potential non-infectious causes of fever    DISCUSSION: This is a 55yo woman with a recent history of cardiac arrhythmia that was initially admitted to hospital after cardiac arrest roughly 1 month ago. She was discharged to ARU 2 weeks ago but was readmitted with worsening LUE wound and fevers. Initial wound thought to be secondary to IV extravasation. The wound grew Enterobacter, Enterococcus and Coag negative Staph and she received broad spectrum antibiotics for this.  Not clear how much cellulitis vs chemical burn. She was recently switched to TMP-SMX after ID consultation.  The wound has been improving steadily. More recent concern is new fevers starting 7/1/17.  The wound seems underwhelming as source of infection.  And without a clear source of infection, empiric antibiotics were stopped yesterday. It is possible that previous broad spectrum antibiotics for wound were treating occult infection and this was 'uncovered' with deescalation of antibiotics but imaging, as below did not reveal other source. Also starting to consider non-infectious causes of fever;  particularly with h/o RA and current pancytopenia. She has some baseline immunosuppression with chronic steroids and recent critical illness.  She also has noted some saddle anesthesia but no back pain currently. No other localizing sources of fever. She feels more ill with fevers but has remained hemodynamically stable. At this point, we would continue to monitor her off antibiotics, which may also help from a diagnostic standpoint. Recommend obtaining blood cultures now since febrile. The differential diagnosis for non-localizing causes of fevers is large and includes infectious and non-infectious causes. Case has been discussed with heme/onc and rheum. Given recent history of arrest, we have been focusing on hospital-acquired infections but should also consider infectious she could have acquired outside of hospital. [place] in [place] and testing for tick-borne infections would seem reasonable. Could also screen for more common viral causes, EBV and CMV. If remains febrile without a cause will need to expand differential of potential viral (HIV, hepatitis viruses - though LFTs have been OK, respiratory viruses, parvovirus), fungal, and mycobacteria causes.    Cornelio Danielle MD  ID Staff  [phone]         Interval History:     Fever currently, to 102.6. Feels OK despite this, visiting with friends and family at bedside helps. Blood cultures remain negative.    Microbiology: All recent (7/1, 7/2, 7/4) blood cultures negative, C diff 7/3/17 negative         Review of Systems:   CONSTITUTIONAL:  Fevers. Still feeling fatigued.  EYES: negative for icterus  ENT:  negative for oral lesions, hearing loss, tinnitus and sore throat  RESPIRATORY:  negative for cough and dyspnea  CARDIOVASCULAR:  negative for chest pain, palpitations  GASTROINTESTINAL:  Negative for diarrhea and constipation  GENITOURINARY:  negative for dysuria  HEME:  No easy bruising/bleeding  INTEGUMENT:  negative for rash and  pruritus  NEURO:  Negative for headache         Current Antimicrobials     Meropenem 5/29-6/3, 7/1-  Vancomycin 5/29-31, 6/19-23, 7/1-73  Ertapenem 6/3-4, 6/19-27  Bactrim 6/27-         Physical Exam:   Ranges for vital signs:  Temp:  [98  F (36.7  C)-102.6  F (39.2  C)] 98.3  F (36.8  C)  Pulse:  [] 126  Heart Rate:  [] 122  Resp:  [18-20] 20  BP: ()/(61-76) 98/67  SpO2:  [96 %-98 %] 96 %    Intake/Output Summary (Last 24 hours) at 07/03/17 2011  Last data filed at 07/03/17 1851   Gross per 24 hour   Intake             1470 ml   Output             4525 ml   Net            -3055 ml     Exam:  GENERAL:  well-developed, well-nourished, sitting in bed in no acute distress.   ENT:  Head is normocephalic, atraumatic. Oropharynx is moist without exudates or ulcers.  EYES:  Eyes have anicteric sclerae.    NECK:  Supple.  LUNGS:  Clear to auscultation.  CARDIOVASCULAR:  Regular rate and rhythm with no murmurs, gallops or rubs.  ABDOMEN:  Normal bowel sounds, soft, nontender.  EXT: Extravasation wound with large area of epidermal loss, slough/necrosis in the middle 10x3 cm, no discharge  SKIN:  No acute rashes.  Line is in place without any surrounding erythema.  NEUROLOGIC:  Grossly nonfocal.         Laboratory Data:     Creatinine   Date Value Ref Range Status   07/06/2017 0.53 0.52 - 1.04 mg/dL Final   07/05/2017 0.59 0.52 - 1.04 mg/dL Final   07/05/2017 0.57 0.52 - 1.04 mg/dL Final   07/04/2017 0.58 0.52 - 1.04 mg/dL Final   07/04/2017 0.54 0.52 - 1.04 mg/dL Final     WBC   Date Value Ref Range Status   07/06/2017 1.7 (L) 4.0 - 11.0 10e9/L Final   07/05/2017 1.4 (L) 4.0 - 11.0 10e9/L Final   07/04/2017 1.5 (L) 4.0 - 11.0 10e9/L Final   07/03/2017 1.8 (L) 4.0 - 11.0 10e9/L Final   07/02/2017 1.9 (L) 4.0 - 11.0 10e9/L Final     Hemoglobin   Date Value Ref Range Status   07/06/2017 8.4 (L) 11.7 - 15.7 g/dL Final     Platelet Count   Date Value Ref Range Status   07/06/2017 34 (LL) 150 - 450 10e9/L Final      Comment:     .  Consistent with previous critical result       Sed Rate   Date Value Ref Range Status   06/19/2017 63 (H) 0 - 30 mm/h Final   05/30/2017 34 (H) 0 - 30 mm/h Final   05/29/2017 22 0 - 30 mm/h Final   03/20/2016 29 0 - 30 mm/h Final     CRP Inflammation   Date Value Ref Range Status   07/06/2017 61.0 (H) 0.0 - 8.0 mg/L Final   07/02/2017 69.0 (H) 0.0 - 8.0 mg/L Final   06/20/2017 110.0 (H) 0.0 - 8.0 mg/L Final   06/19/2017 98.0 (H) 0.0 - 8.0 mg/L Final   06/04/2017 23.0 (H) 0.0 - 8.0 mg/L Final     AST   Date Value Ref Range Status   06/28/2017 41 0 - 45 U/L Final   06/27/2017 54 (H) 0 - 45 U/L Final   06/19/2017 41 0 - 45 U/L Final   06/16/2017 34 0 - 45 U/L Final   06/15/2017 31 0 - 45 U/L Final     ALT   Date Value Ref Range Status   06/28/2017 50 0 - 50 U/L Final   06/27/2017 60 (H) 0 - 50 U/L Final   06/19/2017 63 (H) 0 - 50 U/L Final   06/16/2017 60 (H) 0 - 50 U/L Final   06/15/2017 73 (H) 0 - 50 U/L Final     Bilirubin Total   Date Value Ref Range Status   07/02/2017 0.9 0.2 - 1.3 mg/dL Final   06/28/2017 0.8 0.2 - 1.3 mg/dL Final   06/27/2017 0.6 0.2 - 1.3 mg/dL Final   06/19/2017 0.7 0.2 - 1.3 mg/dL Final   06/16/2017 0.8 0.2 - 1.3 mg/dL Final     Lab Results   Component Value Date     07/06/2017    BUN 23 07/06/2017    CO2 31 07/06/2017

## 2017-07-06 NOTE — PLAN OF CARE
Problem: Goal Outcome Summary  Goal: Goal Outcome Summary  PT cx.  Patient with fever in AM then fatigued in PM.

## 2017-07-06 NOTE — PROGRESS NOTES
"SPIRITUAL HEALTH SERVICES  SPIRITUAL ASSESSMENT Progress Note  [facility] ([place] 6C     PRIMARY FOCUS:     Support for coping    REFERRAL SOURCE: Follow up    ILLNESS CIRCUMSTANCES:   Reviewed documentation. Reflective conversation shared with Amelia and Wolf which integrated elements of illness and family narratives.     Context of Serious Illness/Symptom(s) - Patient and  shared updates of illness: fluid is reducing, fever up, \"miracle honey\" for elbow wound so that they can go forward with surgery.  shared complications - patient described herself as an \"enigma\" - and stress/frustration of advocating with doctors who aren't aware of all of Amelia's complications.    Resources for Support -     DISTRESS:     Emotional/Spiritual/Existential Distress - Patient expressed fatigue at length of stay    Muslim Distress - Not discussed    Social/Cultural/Economic Distress -  expressed the difficulty of managing home life, getting to work, and spending mornings in the hospital.  joked that he has stopped  the laundry when he washes it as incentive for wife to get better, because he knows it would drive her crazy. Patient and  shared laughter.    SPIRITUAL/Orthodox COPING:     Quaker/Jennifer - Prayer is helpful    Spiritual Practice(s) - Not discussed    Emotional/Relational/Existential Connections - Not discussed    GOALS OF CARE:    Goals of Care - Patient expressed goal of reducing fluid, healing elbow wound, and having surgery so she won't need the life vest.    Meaning/Sense-Making - Not discussed.    PLAN: Will follow up with patient and  1-2x/week and offer prayer as patient remains on unit.    Caitie Colón   Intern  Pager [phone]  "

## 2017-07-06 NOTE — PROGRESS NOTES
Pt is alert & oriented x4. VSS except fever this AM, Tylenol given, blood cultures drawn & urinary culture sent. 20 mEq of potassium replaced per protocol this AM. Pt is currently on room air. A-Flutter in 120's. Switching between A-Fib & A-Flutter. One time dose 40 mg IV Lasix given, good urinary output. Pt urine incontinent. MRI checklist completed & sent down for MRI of spine tomorrow. WOC RN changed left arm dressing. Lymph wraps in place. Appetite is good. Pt is NPO starting at midnight. Will continue to monitor and notify CARDS I of any changes.    Dg HERNANDEZ

## 2017-07-06 NOTE — PROGRESS NOTES
Cardiology Daily Note   Date of Service: 6/30/2017  Patient: Amelia Michel  MRN: [id number]  Admission Date: 6/19/2017  Hospital Day # 17    Assessment & Plan:   Amelia Michel is a 56 year old female with PMHx of VSD s/p repair (1969), RA, recently diagnosed atrial fibrillation that was complicated with an OOH cardiac arrest (felt 2/2 long pause with degeneration to VFib while converting Afib to SR with multiple AV prieto agents) who was readmitted from ARU on 6/19 with fever and concern for worsening LUE infection. She is now transferred back to cardiology service as primary on 6/26 for further workup and evaluation of recurrent lactic acidosis, persistent afib and softer blood pressures with tachypnea.         Changes Today:  - gentle diuresis   - MRI of complete spine      # Volume overload/anasarca:  # HFrEF (EF 50-55%):  # Hypotension:  Patient initially up 35# at admission; diuresis initially challenging, secondary to third spacing of fluid (albumin 1.9), but briskly responded with addition of midodrine and ongoing albumin resuscitation. Right heart cath 6/26 with mildly elevated right and left sided filling presures  - Gentle diuresis with furosemide, 40 mg IV daily     # Atrial fibrillation/flutter with RVR:  RVR in setting of volume overload above.  - Continue dig 125mcg  - Increased to atenolol 75 mg daily 7/6/17  - Continue apixaban     # Out of hospital arrest:  Pt will need to wear LifeVest until medically appropriate and cleared from infection/wound standpoint for ICD implantation likely 4-6 weeks after discharge per EP (pending hospital course for LUE wound)    # Fever of unknown origin  Previously attributes to LUE wound, though patient has continued to have fevers despite broad spectrum antibiotics. ID consulted and feels that unlikely secondary to infection. De-escalated antibiotics and have now discontinued antibiotics and will closely monitor.   - MRI spine   - Pain control: Tramadol  25mg q6h prn, Continue morphine gel around edges of wound for pain   - WOCN      # Acute on Chronic Thrombocytopenia:   2/2 hypoproliferative bone marrow. Plts noted to decrease from 104 to as low as 15 during recent hospitalization requiring transfusion w/ chronically low in the 's as outpatient. Possible bactrim is offending agent for exacerbation, and discontinued this medication in the setting of worsening cellulitis.   -- If this is chronic ITP, no need for treatment unless plts drift < 10s if not bleeding  -- Continue to hold RA meds for now  -- Apixaban 2.5 mg BID (held 6/26-6/27, resumed 6/28). This will need to be held 2 days prior to ICD placement after discharge (this will be deferred given acute infection).    # Acute on chronic anemia: Stable  No evidence of overt bleeding (stool light brown). T Bili normal.  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 Now on 10 mg daily, taper to 7.5 mg on 7/1, then to 5 mg daily after 2 weeks if continues to have low disease activity  -- Continue to hold HCQ and MTX until outpatient follow-up  -- Follow-up with [facility] Dr. Conor Cunha in 4-6 weeks after discharge      # Severe Critical Illness Myopathy:   Significant deconditioning w/ median neuropathies most likely localized to the wrists and ulnar neuropathies most likely localized to the elbows secondary to RA.  -- Ongoing extensive PT/OT/SLP      # Malnutrition:   -- Discontinued tube feeds due to improved PO intake  -- Remove NG    Consulting Services: Nutrition, ID, heart failure service    CODE: Full Code  DVT Ppx: Apixaban resumed 6/28  Diet/Fluids: As above.  Disposition: Pending improvement in above.    Pt's care was discussed with bedside RN and  "patient during Care Team Rounds.    Patient was discussed and evaluated with Dr. Villarreal.    Kelly Luis  Internal Medicine PGY2  [phone]    ___________________________________________________________________    Subjective & Interval History:     Last 24 hr care team notes reviewed. Patient with episode of fever this morning. Continues to lack localizing symptoms (no cough, abdominal pain, diarrhea, rashes). LUE arm is improving.   Medications: Reviewed in EPIC. List below for reference    Physical Exam:    Blood pressure 98/67, pulse 126, temperature 98.3  F (36.8  C), temperature source Oral, resp. rate 20, height 1.48 m (4' 10.25\"), weight 79.8 kg (176 lb), SpO2 96 %, not currently breastfeeding.    CONSTITUTIONAL:  Ms. Michel is siting up in bed in no apparent distress.  HEENT: MMM. EOMI.   LUNGS: Normal respiratory effort without increased work of breathing. Lungs clear in anterior fields, mild crackles in bases. No wheezes appreciated.  CARDIOVASCULAR: Irregularly irregular, tachycardic w/ no M/R/G.   ABDOMEN: Soft, distended (edematous abdominal wall), NT, BS +ve.   MUSCULOSKELETAL:  Moves all four extremities without difficulty. Diffuse anasarca.  NEURO: A&Ox3, CN II-XII grossly intact, non-focal.     Lines/Tubes: Rectal tube, brand catheter  PICC Triple Lumen 06/26/17 Right Basilic ok to use. (Active)   Site Assessment WDL 6/26/2017  1:29 PM   External Cath Length (cm) 3 cm 6/26/2017  1:29 PM   Extremity Circumference (cm) 35 cm 6/26/2017  1:29 PM   Dressing Intervention Chlorhexidine patch;Transparent;Securing device;New dressing 6/26/2017  1:29 PM   Dressing Change Due 07/03/17 6/26/2017  1:29 PM   PICC Lumen Assessment Trigger Red;White;Gray 6/26/2017  1:29 PM   Number of days:0       Labs & Studies of Note: Reviewed in Epic  CMP    Recent Labs  Lab 07/06/17  0704 07/05/17 1957 07/05/17  0654 07/04/17  1940 07/04/17  0550 07/03/17 1956 07/02/17  0641 07/01/17  1350 07/01/17  0910    137 136 " 136 136 136  < > 138  --  137   POTASSIUM 3.7 4.0 3.7 4.0 3.8 3.9  < > 4.2 4.0 3.1*   CHLORIDE 101 101 101 100 101 102  < > 105  --  102   CO2 31 30 29 28 26 28  < > 27  --  27   ANIONGAP 5 6 7 8 9 6  < > 6  --  8   GLC 89 130* 114* 191* 100* 149*  < > 118*  --  166*   BUN 23 26 25 26 29 34*  < > 36*  --  37*   CR 0.53 0.59 0.57 0.58 0.54 0.55  < > 0.66  --  0.72   GFRESTIMATED >90Non  GFR Calc >90Non  GFR Calc >90Non  GFR Calc >90Non  GFR Calc >90Non  GFR Calc >90Non  GFR Calc  < > >90Non  GFR Calc  --  84   GFRESTBLACK >90African American GFR Calc >90African American GFR Calc >90African American GFR Calc >90African American GFR Calc >90African American GFR Calc >90African American GFR Calc  < > >90African American GFR Calc  --  >90African American GFR Calc   VIKTORIYA 8.6 8.0* 8.1* 7.7* 8.1* 8.0*  < > 8.1*  --  8.0*   MAG  --   --   --   --  2.4* 1.7  --   --  2.3 1.4*   PHOS  --   --   --  2.9  --  2.4*  --   --   --   --    BILITOTAL  --   --   --   --   --   --   --  0.9  --   --    < > = values in this interval not displayed.    CBC    Recent Labs  Lab 07/06/17  1318 07/05/17  0654 07/04/17  0550 07/03/17  0704   WBC 1.7* 1.4* 1.5* 1.8*   RBC 2.74* 2.37* 2.91* 2.60*   HGB 8.4* 7.2* 8.7* 7.8*   HCT 25.6* 21.8* 26.6* 24.1*   PLT 34* 27* 36* 23*       INR  No lab results found in last 7 days.    Unresulted Labs Ordered in the Past 30 Days of this Admission     Date and Time Order Name Status Description    7/6/2017 1222 Lyme Conf IgG and IgM by Immunoblot In process     7/6/2017 1222 EBV DNA PCR Quantitative Whole Blood In process     7/6/2017 1222 EBV Capsid Antibody IgM In process     7/6/2017 1222 EBV Capsid Antibody IgG In process     7/6/2017 1222 CMV DNA quantification In process     7/6/2017 1222 CMV antibody IgM In process     7/6/2017 1222 CMV Antibody IgG In process     7/6/2017 1222 Blood  Morphology Pathologist Review In process     7/6/2017 1006 Urine Culture Aerobic Bacterial In process     7/6/2017 1006 Blood culture Preliminary     7/6/2017 1006 Blood culture Preliminary     7/4/2017 0501 Blood culture Preliminary     7/4/2017 0501 Blood culture Preliminary     7/2/2017 1224 Blood culture Preliminary     7/2/2017 1224 Blood culture Preliminary     7/1/2017 1103 Blood culture Preliminary     7/1/2017 1103 Blood culture Preliminary     5/29/2017 1046 Adalimumab Activity and Neutralizing Antibodies: Laboratory Miscellaneous Order In process           Medication List for Reference (delete if desired)  Current Facility-Administered Medications   Medication     [START ON 7/7/2017] atenolol (TENORMIN) tablet 75 mg     [START ON 7/7/2017] furosemide (LASIX) injection 40 mg     miconazole (MICATIN; MICRO GUARD) 2 % powder     predniSONE (DELTASONE) tablet 7.5 mg     apixaban ANTICOAGULANT (ELIQUIS) tablet 2.5 mg     midodrine (PROAMATINE) tablet 10 mg     lidocaine (LMX4) kit     calcium carbonate (TUMS) chewable tablet 500 mg     pantoprazole (PROTONIX) EC tablet 40 mg     digoxin (LANOXIN) tablet 125 mcg     ondansetron (ZOFRAN) tablet 4 mg     prochlorperazine (COMPAZINE) injection 5-10 mg     ondansetron (ZOFRAN) injection 4 mg     simethicone (MYLICON) chewable tablet 80 mg     sodium chloride (PF) 0.9% PF flush 10-20 mL     sodium chloride (PF) 0.9% PF flush 10 mL     heparin lock flush 10 UNIT/ML injection 5-10 mL     heparin lock flush 10 UNIT/ML injection 5-10 mL     dextrose 10 % 1,000 mL infusion     folic acid (FOLVITE) tablet 1 mg     gabapentin (NEURONTIN) capsule 200 mg     melatonin tablet 3 mg     morphine 0.1% in solosite topical gel 2 g     multivitamin, therapeutic with minerals (THERA-VIT-M) tablet 1 tablet     triamcinolone (KENALOG) 0.1 % ointment     lidocaine 1 % 1 mL     lidocaine (LMX4) kit     sodium chloride (PF) 0.9% PF flush 3 mL     sodium chloride (PF) 0.9% PF flush 3 mL      medication instruction     acetaminophen (TYLENOL) tablet 650 mg     acetaminophen (TYLENOL) Suppository 650 mg     Patient is already receiving anticoagulation with heparin, enoxaparin (LOVENOX), warfarin (COUMADIN)  or other anticoagulant medication     glucose 40 % gel 15-30 g    Or     dextrose 50 % injection 25-50 mL    Or     glucagon injection 1 mg     insulin aspart (NovoLOG) inj (RAPID ACTING)     insulin aspart (NovoLOG) inj (RAPID ACTING)     naloxone (NARCAN) injection 0.1-0.4 mg     calcium-vitamin D (CALTRATE) 600-400 MG-UNIT per tablet 2 tablet     co-enzyme Q-10 capsule 30 mg     morphine 0.1% in solosite topical gel     potassium chloride SA (K-DUR/KLOR-CON M) CR tablet 20-40 mEq     potassium chloride (KLOR-CON) Packet 20-40 mEq     potassium chloride 10 mEq in 100 mL intermittent infusion     potassium chloride 10 mEq in 100 mL intermittent infusion with 10 mg lidocaine     potassium chloride 20 mEq in 50 mL intermittent infusion     magnesium sulfate 2 g in NS intermittent infusion (PharMEDium or FV Cmpd)     magnesium sulfate 4 g in 100 mL sterile water (premade)     sodium phosphate 10 mmol in D5W intermittent infusion     sodium phosphate 15 mmol in D5W intermittent infusion     sodium phosphate 20 mmol in D5W intermittent infusion     sodium phosphate 25 mmol in D5W intermittent infusion     melatonin tablet 1 mg     traMADol (ULTRAM) half-tab 25 mg     ATTENDING ATTESTATION:  Patient has been seen and evaluated by me on July 6, 2017. I have reviewed the medications, laboratory, imaging, and other relevant results.  I agree with the above note which I have edited, as necessary.  I have discussed my assessment and plan with the house staff.    Tere Villarreal MD, MS  Staff Cardiologist, [facility]  Pager: [phone]

## 2017-07-06 NOTE — PROGRESS NOTES
Calorie Counts  Intake recorded for: 7/5 Kcals: 1003  Protein: 45g  # Meals Recorded: 100% 2 servings pears, corn flakes w/ milk, 3 iced teas w/ lemon, deli sandwich w/ cheese and santiago, garden salad w/ Arabic dressing, squash, grapes, 75% hot roast beef w/ gravy  # Supplements Recorded: 100% 8 oz 1% milk with 1 packet Beneprotein

## 2017-07-07 ENCOUNTER — APPOINTMENT (OUTPATIENT)
Dept: PHYSICAL THERAPY | Facility: CLINIC | Age: 57
DRG: 871 | End: 2017-07-07
Attending: INTERNAL MEDICINE
Payer: COMMERCIAL

## 2017-07-07 ENCOUNTER — APPOINTMENT (OUTPATIENT)
Dept: SPEECH THERAPY | Facility: CLINIC | Age: 57
DRG: 871 | End: 2017-07-07
Attending: INTERNAL MEDICINE
Payer: COMMERCIAL

## 2017-07-07 LAB
ALBUMIN UR-MCNC: 30 MG/DL
ANION GAP SERPL CALCULATED.3IONS-SCNC: 5 MMOL/L (ref 3–14)
APPEARANCE UR: CLEAR
BACTERIA SPEC CULT: NO GROWTH
BACTERIA SPEC CULT: NO GROWTH
BILIRUB UR QL STRIP: NEGATIVE
BUN SERPL-MCNC: 25 MG/DL (ref 7–30)
CALCIUM SERPL-MCNC: 8.5 MG/DL (ref 8.5–10.1)
CHLORIDE SERPL-SCNC: 99 MMOL/L (ref 94–109)
CMV DNA SPEC NAA+PROBE-ACNC: NORMAL [IU]/ML
CMV DNA SPEC NAA+PROBE-LOG#: NORMAL {LOG_IU}/ML
CMV IGG SERPL QL IA: 0.2 AI (ref 0–0.8)
CMV IGM SERPL QL IA: NORMAL AI (ref 0–0.8)
CO2 SERPL-SCNC: 32 MMOL/L (ref 20–32)
COLOR UR AUTO: YELLOW
COPATH REPORT: NORMAL
CREAT SERPL-MCNC: 0.56 MG/DL (ref 0.52–1.04)
EBV DNA # SPEC NAA+PROBE: NORMAL {COPIES}/ML
EBV DNA SPEC NAA+PROBE-LOG#: NORMAL {LOG_COPIES}/ML
EBV VCA IGG SER QL IA: ABNORMAL AI (ref 0–0.8)
EBV VCA IGM SER QL IA: NORMAL AI (ref 0–0.8)
ERYTHROCYTE [DISTWIDTH] IN BLOOD BY AUTOMATED COUNT: 22.7 % (ref 10–15)
GFR SERPL CREATININE-BSD FRML MDRD: ABNORMAL ML/MIN/1.7M2
GLUCOSE SERPL-MCNC: 100 MG/DL (ref 70–99)
GLUCOSE UR STRIP-MCNC: NEGATIVE MG/DL
HCT VFR BLD AUTO: 22.5 % (ref 35–47)
HGB BLD-MCNC: 7.4 G/DL (ref 11.7–15.7)
HGB UR QL STRIP: NEGATIVE
KETONES UR STRIP-MCNC: NEGATIVE MG/DL
LACTATE BLD-SCNC: 2.1 MMOL/L (ref 0.7–2.1)
LACTATE BLD-SCNC: 3.1 MMOL/L (ref 0.7–2.1)
LEUKOCYTE ESTERASE UR QL STRIP: NEGATIVE
MCH RBC QN AUTO: 30.7 PG (ref 26.5–33)
MCHC RBC AUTO-ENTMCNC: 32.9 G/DL (ref 31.5–36.5)
MCV RBC AUTO: 93 FL (ref 78–100)
MICRO REPORT STATUS: NORMAL
MICRO REPORT STATUS: NORMAL
MISCELLANEOUS TEST: NORMAL
MUCOUS THREADS #/AREA URNS LPF: PRESENT /LPF
NITRATE UR QL: NEGATIVE
PH UR STRIP: 5.5 PH (ref 5–7)
PLATELET # BLD AUTO: 27 10E9/L (ref 150–450)
POTASSIUM SERPL-SCNC: 3.5 MMOL/L (ref 3.4–5.3)
RBC # BLD AUTO: 2.41 10E12/L (ref 3.8–5.2)
RBC #/AREA URNS AUTO: 2 /HPF (ref 0–2)
SODIUM SERPL-SCNC: 136 MMOL/L (ref 133–144)
SP GR UR STRIP: 1.02 (ref 1–1.03)
SPECIMEN SOURCE: NORMAL
SQUAMOUS #/AREA URNS AUTO: 1 /HPF (ref 0–1)
TRANS CELLS #/AREA URNS HPF: <1 /HPF (ref 0–1)
URN SPEC COLLECT METH UR: ABNORMAL
UROBILINOGEN UR STRIP-MCNC: NORMAL MG/DL (ref 0–2)
WBC # BLD AUTO: 1.4 10E9/L (ref 4–11)
WBC #/AREA URNS AUTO: 2 /HPF (ref 0–2)

## 2017-07-07 PROCEDURE — 83735 ASSAY OF MAGNESIUM: CPT | Performed by: STUDENT IN AN ORGANIZED HEALTH CARE EDUCATION/TRAINING PROGRAM

## 2017-07-07 PROCEDURE — 36415 COLL VENOUS BLD VENIPUNCTURE: CPT | Performed by: INTERNAL MEDICINE

## 2017-07-07 PROCEDURE — 36415 COLL VENOUS BLD VENIPUNCTURE: CPT | Performed by: STUDENT IN AN ORGANIZED HEALTH CARE EDUCATION/TRAINING PROGRAM

## 2017-07-07 PROCEDURE — 21400006 ZZH R&B CCU INTERMEDIATE UMMC

## 2017-07-07 PROCEDURE — 80048 BASIC METABOLIC PNL TOTAL CA: CPT | Performed by: STUDENT IN AN ORGANIZED HEALTH CARE EDUCATION/TRAINING PROGRAM

## 2017-07-07 PROCEDURE — 25000132 ZZH RX MED GY IP 250 OP 250 PS 637: Performed by: STUDENT IN AN ORGANIZED HEALTH CARE EDUCATION/TRAINING PROGRAM

## 2017-07-07 PROCEDURE — 25000132 ZZH RX MED GY IP 250 OP 250 PS 637: Performed by: NURSE PRACTITIONER

## 2017-07-07 PROCEDURE — 25000131 ZZH RX MED GY IP 250 OP 636 PS 637: Performed by: STUDENT IN AN ORGANIZED HEALTH CARE EDUCATION/TRAINING PROGRAM

## 2017-07-07 PROCEDURE — 87385 HISTOPLASMA CAPSUL AG IA: CPT | Performed by: INTERNAL MEDICINE

## 2017-07-07 PROCEDURE — 87015 SPECIMEN INFECT AGNT CONCNTJ: CPT | Performed by: INTERNAL MEDICINE

## 2017-07-07 PROCEDURE — 97116 GAIT TRAINING THERAPY: CPT | Mod: GP | Performed by: REHABILITATION PRACTITIONER

## 2017-07-07 PROCEDURE — 87040 BLOOD CULTURE FOR BACTERIA: CPT | Performed by: STUDENT IN AN ORGANIZED HEALTH CARE EDUCATION/TRAINING PROGRAM

## 2017-07-07 PROCEDURE — 25000128 H RX IP 250 OP 636: Performed by: INTERNAL MEDICINE

## 2017-07-07 PROCEDURE — 97110 THERAPEUTIC EXERCISES: CPT | Mod: GP | Performed by: REHABILITATION PRACTITIONER

## 2017-07-07 PROCEDURE — 25000125 ZZHC RX 250: Performed by: INTERNAL MEDICINE

## 2017-07-07 PROCEDURE — 40000193 ZZH STATISTIC PT WARD VISIT: Performed by: REHABILITATION PRACTITIONER

## 2017-07-07 PROCEDURE — 99232 SBSQ HOSP IP/OBS MODERATE 35: CPT | Mod: GC | Performed by: INTERNAL MEDICINE

## 2017-07-07 PROCEDURE — 25000125 ZZHC RX 250: Performed by: STUDENT IN AN ORGANIZED HEALTH CARE EDUCATION/TRAINING PROGRAM

## 2017-07-07 PROCEDURE — 97530 THERAPEUTIC ACTIVITIES: CPT | Mod: GP | Performed by: REHABILITATION PRACTITIONER

## 2017-07-07 PROCEDURE — 99222 1ST HOSP IP/OBS MODERATE 55: CPT | Mod: GC | Performed by: INTERNAL MEDICINE

## 2017-07-07 PROCEDURE — 83605 ASSAY OF LACTIC ACID: CPT | Performed by: INTERNAL MEDICINE

## 2017-07-07 PROCEDURE — 81001 URINALYSIS AUTO W/SCOPE: CPT | Performed by: STUDENT IN AN ORGANIZED HEALTH CARE EDUCATION/TRAINING PROGRAM

## 2017-07-07 PROCEDURE — 25000128 H RX IP 250 OP 636: Performed by: STUDENT IN AN ORGANIZED HEALTH CARE EDUCATION/TRAINING PROGRAM

## 2017-07-07 PROCEDURE — 87449 NOS EACH ORGANISM AG IA: CPT | Performed by: INTERNAL MEDICINE

## 2017-07-07 PROCEDURE — 25000132 ZZH RX MED GY IP 250 OP 250 PS 637

## 2017-07-07 PROCEDURE — 40000225 ZZH STATISTIC SLP WARD VISIT: Performed by: SPEECH-LANGUAGE PATHOLOGIST

## 2017-07-07 PROCEDURE — P9047 ALBUMIN (HUMAN), 25%, 50ML: HCPCS | Performed by: STUDENT IN AN ORGANIZED HEALTH CARE EDUCATION/TRAINING PROGRAM

## 2017-07-07 PROCEDURE — 92507 TX SP LANG VOICE COMM INDIV: CPT | Mod: GN | Performed by: SPEECH-LANGUAGE PATHOLOGIST

## 2017-07-07 PROCEDURE — 86666 EHRLICHIA ANTIBODY: CPT | Performed by: INTERNAL MEDICINE

## 2017-07-07 PROCEDURE — 80048 BASIC METABOLIC PNL TOTAL CA: CPT | Performed by: INTERNAL MEDICINE

## 2017-07-07 PROCEDURE — 25000132 ZZH RX MED GY IP 250 OP 250 PS 637: Performed by: INTERNAL MEDICINE

## 2017-07-07 PROCEDURE — 85027 COMPLETE CBC AUTOMATED: CPT | Performed by: INTERNAL MEDICINE

## 2017-07-07 PROCEDURE — 87207 SMEAR SPECIAL STAIN: CPT | Performed by: INTERNAL MEDICINE

## 2017-07-07 PROCEDURE — 36592 COLLECT BLOOD FROM PICC: CPT | Performed by: INTERNAL MEDICINE

## 2017-07-07 RX ORDER — ALBUMIN (HUMAN) 12.5 G/50ML
50 SOLUTION INTRAVENOUS ONCE
Status: COMPLETED | OUTPATIENT
Start: 2017-07-07 | End: 2017-07-07

## 2017-07-07 RX ADMIN — SODIUM CHLORIDE, PRESERVATIVE FREE 5 ML: 5 INJECTION INTRAVENOUS at 01:37

## 2017-07-07 RX ADMIN — SODIUM CHLORIDE, PRESERVATIVE FREE 5 ML: 5 INJECTION INTRAVENOUS at 07:10

## 2017-07-07 RX ADMIN — APIXABAN 2.5 MG: 2.5 TABLET, FILM COATED ORAL at 08:45

## 2017-07-07 RX ADMIN — ACETAMINOPHEN 650 MG: 325 TABLET, FILM COATED ORAL at 11:26

## 2017-07-07 RX ADMIN — POTASSIUM CHLORIDE 20 MEQ: 750 TABLET, EXTENDED RELEASE ORAL at 08:45

## 2017-07-07 RX ADMIN — PANTOPRAZOLE SODIUM 40 MG: 40 TABLET, DELAYED RELEASE ORAL at 06:51

## 2017-07-07 RX ADMIN — ALBUMIN (HUMAN) 50 G: 12.5 SOLUTION INTRAVENOUS at 02:19

## 2017-07-07 RX ADMIN — MIDODRINE HYDROCHLORIDE 10 MG: 5 TABLET ORAL at 08:46

## 2017-07-07 RX ADMIN — CALCIUM CARBONATE 600 MG (1,500 MG)-VITAMIN D3 400 UNIT TABLET 2 TABLET: at 08:45

## 2017-07-07 RX ADMIN — GABAPENTIN 200 MG: 100 CAPSULE ORAL at 20:53

## 2017-07-07 RX ADMIN — Medication 30 MG: at 08:45

## 2017-07-07 RX ADMIN — Medication: at 14:37

## 2017-07-07 RX ADMIN — FOLIC ACID 1 MG: 1 TABLET ORAL at 08:45

## 2017-07-07 RX ADMIN — FUROSEMIDE 40 MG: 10 INJECTION, SOLUTION INTRAVENOUS at 08:46

## 2017-07-07 RX ADMIN — ATENOLOL 75 MG: 50 TABLET ORAL at 08:45

## 2017-07-07 RX ADMIN — MIDODRINE HYDROCHLORIDE 10 MG: 5 TABLET ORAL at 18:49

## 2017-07-07 RX ADMIN — GABAPENTIN 200 MG: 100 CAPSULE ORAL at 13:43

## 2017-07-07 RX ADMIN — SODIUM CHLORIDE, PRESERVATIVE FREE 10 ML: 5 INJECTION INTRAVENOUS at 13:43

## 2017-07-07 RX ADMIN — MIDODRINE HYDROCHLORIDE 10 MG: 5 TABLET ORAL at 13:43

## 2017-07-07 RX ADMIN — GABAPENTIN 200 MG: 100 CAPSULE ORAL at 08:45

## 2017-07-07 RX ADMIN — MULTIPLE VITAMINS W/ MINERALS TAB 1 TABLET: TAB at 08:45

## 2017-07-07 RX ADMIN — MICONAZOLE NITRATE: 2 POWDER TOPICAL at 20:53

## 2017-07-07 RX ADMIN — MICONAZOLE NITRATE: 2 POWDER TOPICAL at 08:46

## 2017-07-07 RX ADMIN — PREDNISONE 7.5 MG: 5 TABLET ORAL at 08:45

## 2017-07-07 RX ADMIN — DIGOXIN 125 MCG: 125 TABLET ORAL at 08:46

## 2017-07-07 RX ADMIN — APIXABAN 2.5 MG: 2.5 TABLET, FILM COATED ORAL at 20:53

## 2017-07-07 NOTE — PLAN OF CARE
Problem: Goal Outcome Summary  Goal: Goal Outcome Summary  Pt seen for language tx- pt has met goals for complex auditory processing/comprehension and also complex verbal expression ( reassessed these areas and pt testing at 100% accuracy). Pt endorsing that cognitive linguistic skills are at or close to baseline and are functional for her ADL's at home etc. No further sptx is indicated at this time and pt is in agreement with this. Will sign off.  Speech Language Therapy Discharge Summary     Reason for therapy discharge:    All goals and outcomes met, no further needs identified.     Progress towards therapy goal(s). See goals on Care Plan in [facility] electronic health record for goal details.  Goals met     Therapy recommendation(s):    No further therapy is recommended. See above summary

## 2017-07-07 NOTE — PLAN OF CARE
Problem: Goal Outcome Summary  Goal: Goal Outcome Summary  PT - per plan established by the Physical Therapist, according to functional mobility the  discharge recommendation is return to ARU for cont skilled PT to progress functional mobility. Pt is very willing to be seen by PT today. Pt up in chair at start of fPT session. Pt stating not feeling as good today but wanting to get up and move. Pt demo amb up to 70'x 2 with long seated rest between. Pt demo seated  There x program x 10.

## 2017-07-07 NOTE — PROGRESS NOTES
[facility] ID Service: Follow Up Note    Patient:  Amelia Michel, Date of birth 1960, Medical record number [id number]  Date of Visit: July 6, 2017         Assessment and Recommendations:     ASSESSMENT:  1. Fevers, unknown source  2. LUE wound and cellulitis, improving  3. Leukopenia and thrombocytopenia  4. RA on prednisolone taper currently on 7.5 mg daily  5. Recent cardiac arrest and HFrEF (pending ICD placement)  6. H/o ESBL UTI s/p treatment  7. PCN allergy  8. Aflutter/Afib with RVR     RECOMMENDATIONS:  1. Continue holding antibiotics  2. Continue to follow pending blood cultures, repeat additional set tomorrow if still febrile  3. Follow up pending results; blood parasite stain, serology results (Lyme, anaplasma, EBV, CMV)  4. I have added the following tests to the above work-up: fungal urinary antigens, quantiferon gold, HIV screen  5. Agree with work-up initiated for potential non-infectious causes of fever and heme-onc consult    DISCUSSION: This is a 55yo woman with a recent history of cardiac arrhythmia that was initially admitted to hospital after cardiac arrest roughly 1 month ago. She was discharged to ARU 2 weeks ago but was readmitted with worsening LUE wound and fevers. Initial wound thought to be secondary to IV extravasation. The wound grew Enterobacter, Enterococcus and Coag negative Staph and she received broad spectrum antibiotics for this.  Not clear how much cellulitis vs chemical burn. She was recently switched to TMP-SMX after ID consultation.  The wound has been improving steadily. More recent concern is new fevers starting 7/1/17.  ID was initially consulted for evolving sepsis picture in context of no localization. The wound seems underwhelming as source of infection.  And without a clear source of infection, empiric antibiotics were stopped yesterday. It is possible that previous broad spectrum antibiotics for wound were treating occult infection and this was  'uncovered' with deescalation of antibiotics but imaging, as below did not reveal other source. Would continue to follow blood cultures and repeat with fevers, this is for evaluation of endocarditis.  Also starting to consider non-infectious causes of fever; particularly with h/o RA and current pancytopenia. She has some baseline immunosuppression with chronic steroids and recent critical illness.  She also has noted some saddle anesthesia but no back pain currently. No other localizing sources of fever. US for DVT was negative, MRI of spine was not revealing for infectious source. She feels more ill in midst of fevers but has remained hemodynamically stable after stopping antibiotics.    At this point, we would continue to monitor her off antibiotics, which may also help from a diagnostic standpoint. Recommend continuing to obtain blood cultures now that she is off antibiotics. The differential diagnosis for non-localizing causes of fevers is large and includes infectious and non-infectious causes. Case will be reviewed by heme/onc given concurrent pancytopenia which we agree with. Given recent history of arrest, we have been focusing on hospital-acquired infections but should also consider infectious she could have acquired outside of hospital. She is mildly immune-suppressed do to chronic corticosteroids for RA and recent critical illness.  [place] and testing for tick-borne infections would seem reasonable. Histoplasmosis typically presents with pulmonary disease but can sometimes present as a more chronic disseminated form in older adults or those with chronic illness so could check a urinary antigen.  Could also screen for more common viral causes, EBV and CMV. If remains febrile without a cause will need to expand differential of potential viral (hepatitis viruses - though LFTs have been OK, respiratory viruses, parvovirus), other fungal, and mycobacteria causes.    ID will  sign off from the case for now since antibiotics have been stopped. An initial ID fever work-up has been started, however, and is largely still pending. Please contact us if any of these pending test results are revealing or if she continues to have fevers without an identifiable cause (since I am going off service this should be entered as new consult).    Cornelio Danielle MD  ID Staff  [phone]         Interval History:     She again had some subjective fevers overning. Feels OK despite this,  and sister present. Blood cultures remain negative.    Microbiology: All recent (7/1, 7/2, 7/4, 7/6, 7/7) blood cultures negative, C diff 7/3/17 negative         Review of Systems:   CONSTITUTIONAL:  Fevers as above. Still feeling fatigued.  EYES: negative for icterus  ENT:  negative for oral lesions, hearing loss, tinnitus and sore throat  RESPIRATORY:  negative for cough and dyspnea  CARDIOVASCULAR:  negative for chest pain, palpitations  GASTROINTESTINAL:  Negative for diarrhea and constipation  GENITOURINARY:  negative for dysuria  HEME:  No easy bruising/bleeding  INTEGUMENT:  negative for rash and pruritus  NEURO:  Negative for headache         Current Antimicrobials   None    Previous:  Meropenem 5/29-6/3, 7/1-7/5  Vancomycin 5/29-31, 6/19-23, 7/1-73  Ertapenem 6/3-4, 6/19-27  Bactrim 6/27-         Physical Exam:   Ranges for vital signs:  Temp:  [97.9  F (36.6  C)-100.7  F (38.2  C)] 100.7  F (38.2  C)  Heart Rate:  [] 110  Resp:  [18-22] 18  BP: ()/(60-92) 111/70  SpO2:  [94 %-98 %] 94 %    Intake/Output Summary (Last 24 hours) at 07/03/17 2011  Last data filed at 07/03/17 1851   Gross per 24 hour   Intake             1470 ml   Output             4525 ml   Net            -3055 ml     Exam:  GENERAL:  well-developed, well-nourished, sitting in bed in no acute distress.   ENT:  Head is normocephalic, atraumatic. Oropharynx is moist without exudates or ulcers.  EYES:  Eyes have anicteric sclerae.     NECK:  Supple.  LUNGS:  Clear to auscultation.  CARDIOVASCULAR:  Regular rate and rhythm with no murmurs, gallops or rubs.  ABDOMEN:  Normal bowel sounds, soft, nontender.  EXT: Extravasation wound with large area of epidermal loss, slough/necrosis in the middle 10x3 cm, no discharge  SKIN:  No acute rashes.  Line is in place without any surrounding erythema.  NEUROLOGIC:  Grossly nonfocal.         Laboratory Data:     Creatinine   Date Value Ref Range Status   07/07/2017 0.56 0.52 - 1.04 mg/dL Final   07/06/2017 0.53 0.52 - 1.04 mg/dL Final   07/05/2017 0.59 0.52 - 1.04 mg/dL Final   07/05/2017 0.57 0.52 - 1.04 mg/dL Final   07/04/2017 0.58 0.52 - 1.04 mg/dL Final     WBC   Date Value Ref Range Status   07/07/2017 1.4 (L) 4.0 - 11.0 10e9/L Final   07/06/2017 1.7 (L) 4.0 - 11.0 10e9/L Final   07/05/2017 1.4 (L) 4.0 - 11.0 10e9/L Final   07/04/2017 1.5 (L) 4.0 - 11.0 10e9/L Final   07/03/2017 1.8 (L) 4.0 - 11.0 10e9/L Final     Hemoglobin   Date Value Ref Range Status   07/07/2017 7.4 (L) 11.7 - 15.7 g/dL Final     Platelet Count   Date Value Ref Range Status   07/07/2017 27 (LL) 150 - 450 10e9/L Final     Comment:     .  Consistent with previous critical result       Sed Rate   Date Value Ref Range Status   06/19/2017 63 (H) 0 - 30 mm/h Final   05/30/2017 34 (H) 0 - 30 mm/h Final   05/29/2017 22 0 - 30 mm/h Final   03/20/2016 29 0 - 30 mm/h Final     CRP Inflammation   Date Value Ref Range Status   07/06/2017 61.0 (H) 0.0 - 8.0 mg/L Final   07/02/2017 69.0 (H) 0.0 - 8.0 mg/L Final   06/20/2017 110.0 (H) 0.0 - 8.0 mg/L Final   06/19/2017 98.0 (H) 0.0 - 8.0 mg/L Final   06/04/2017 23.0 (H) 0.0 - 8.0 mg/L Final     AST   Date Value Ref Range Status   06/28/2017 41 0 - 45 U/L Final   06/27/2017 54 (H) 0 - 45 U/L Final   06/19/2017 41 0 - 45 U/L Final   06/16/2017 34 0 - 45 U/L Final   06/15/2017 31 0 - 45 U/L Final     ALT   Date Value Ref Range Status   06/28/2017 50 0 - 50 U/L Final   06/27/2017 60 (H) 0 - 50 U/L  Final   06/19/2017 63 (H) 0 - 50 U/L Final   06/16/2017 60 (H) 0 - 50 U/L Final   06/15/2017 73 (H) 0 - 50 U/L Final     Bilirubin Total   Date Value Ref Range Status   07/02/2017 0.9 0.2 - 1.3 mg/dL Final   06/28/2017 0.8 0.2 - 1.3 mg/dL Final   06/27/2017 0.6 0.2 - 1.3 mg/dL Final   06/19/2017 0.7 0.2 - 1.3 mg/dL Final   06/16/2017 0.8 0.2 - 1.3 mg/dL Final     Lab Results   Component Value Date     07/07/2017    BUN 25 07/07/2017    CO2 32 07/07/2017     Recent imaging:    MRI spine 7/6/17  Redemonstration of marked degenerative changes throughout  the lumbar spine, stable since 3/20/2016. Findings include severe  right neural foraminal narrowing at L1-2, and severe left neural  foraminal narrowing at L2-3 and L3-4.    1. No abnormal enhancing lesions or other findings to suggest  infection. Relative hypointensity of CSF on T2-weighted imaging is  favored to be artifactual.  2. Redemonstration of anterior compression of the spinal cord at C3-4,  which again may be due to a arachnoid cyst.    1. No abnormal enhancement in the spinal cord, thecal sac or cervical  vertebrae.  2. 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 Stable  resultant grade 1 anterolisthesis of C4 on C5, grade 2 anterolisthesis  of C5 and C6, and grade 1 anterolisthesis of C7 on T1.  3. Stable multilevel cervical spondylosis.     Lower and upper extremity US 7/5/17: No DVTs    CT abd pelv 7/1/17  1.  Although heterogeneous appearance of the spleen may be related CT  technique, wedgelike areas of hypoattenuation raise suspicion for  splenic infarct.    2.  Extensive anasarca, new since 5/29/2017.  Mild periportal edema.  3.  Small bilateral pleural effusions, right greater than left.  Right  lower lobar atelectasis/consolidation.  4.  Resolved small peripancreatic fluid collection.  5.  Partially visualized acute bilateral anterior rib fractures.  6.  Small free fluid in the pelvis, increased.    CXR 7/1/17: Improving right  pleural effusion and basilar opacity.

## 2017-07-07 NOTE — PROGRESS NOTES
Social Work Services Progress Note    Hospital Day: 19  Date of Initial Social Work Evaluation:  6/20/17  Collaborated with:  Cards 1 team    Data:  Pt is a 56 year [facility]   following for [facility] back at [facility] as [facility].    Intervention:  No changes for today.  Pt continues to not be medically stable to return to ARU.  Pt continues to have difficulty with fevers and low WBC.  Pt continues to be fluid up.  Continue plan for return to ARU once more medically stable.    Assessment:  No change, spouse continues to provide daily cares at bedside.    Plan:    Anticipated Disposition:  Facility:  Return to [facility] as able.    Barriers to d/c plan:  Medical stability    Follow Up:   follow for discharge planning.    DENNIS Larsen, APSW  6C Unit   Phone: [phone]  Pager: [phone]  Unit: [phone]      ___________________________________________________________________________________________________________________________________________________    Referrals in process:    [facility]U - return when medically stable.     Referrals Discontinued:  None    Community Case Management/Community Services in place:   None

## 2017-07-07 NOTE — CONSULTS
[facility], [place]    History and Physical  Hematology / Oncology CONSULTATION      Date of Admission: 6/30/2015  Date of Service (when I saw the patient): 07/07/2015    ASSESSMENT:    Bree Michel is a 57 yo female with PMHx of VSD, RA (on chronic methotrexate), and a recently diagnosed Afib resulting in cardiac arrest on 5/31, presenting with normocytic anemia, thrombocytopenia, and leukopenia without significant neutropenia, now in the setting of a fever of unknown origin.    Looking at the sequence of events her blood counts acutely dropped immediately following her cardiac arrest. In the setting of long-term methotrexate use, the acute stress of cardiac arrest likely caused bone-marrow shock resulting in a reduction of hematopoiesis and a hypoproliferative marrow. Coupled with transient use of Bactrim might have also contributed with marrow suppression. A primary bone marrow malignancy (e.g. MDS, leukemia, etc.) or bone marrow failure syndromes like aplastic anemia would also be very unlikely at this time.Other considerations on the DDx for her cytpenia would still be an underlying infectious process yet to be identified (agree with ID workup at this time), new-onset autoimmune process (such as ITP, but this would be diagnosis of exclusion in the absence of other possible etiology of thrombocytopenia), DIC (less likely give no s/sx of active bleeding). In regards to her fevers, it is unlikely to related to neutropenia as her ANC is only mildly reduced.    We will continue to follow this patient and monitor blood counts throughout the weekend. If not improved into next week, we can consider trial of Thrombopoetin (TPO) receptor agonist like Eltrombopag to improve the platelet count. We would also re-visit the discussion of bone marrow biopsy to investigate other marrow pathologies at a later date.    RECOMMENDATIONS:  - Goal Hb >7 gm  -Goal Plt count 10K if no bleeding or 50K if  active bleeding  -Additional anemia work-up labs (B12, Folate, MMA)  -EPO level as pt might have CKD related anemia  -Repeat coag studies (INR/PTT)  - DIC panel (D-Dimer, fibrinogen)  - Agree with ID assessment and would recommend viral, fungal, and parasitic serological work-up to identify fever source.  - Recommend D/C apixiban for Afib anticoagulation given severe thrombocytopenia that will increase the risk for bleeding  -Monitor for bleeding    Pt seen and discussed with Dr. Silverman who agrees with the plan.    Agree with below documentation from [place] MS4    Fox Salinas MD  Hematology Oncology fellow  [phone]          Chief Complaint  Anemia and thrombocytopenia    History of Present Illness  History obtained from chart review and discussed with patient.    Bree Michel is a 57 yo female with PMHx of VSD, RA (on chronic methotrexate), and a recently diagnosed Afib resulting in cardiac arrest on 5/31. She was discharged to an ARU, but readmitted on 6/19 with fever and a LUE infection, treated with broad-spectrum ABx and recently Bactrim from 6/27-7/1 for suspected cellulitis. She has now developed new, cyclical fevers beginning on 7/1 (Tmax: 39.2C) in the absence of an identifiable source. Additionally, she presents with thrombocytopenia (27k), normocytic anemia (7.4 MCV: 93), and non-neutropenic leukopenia (1.4 with an ANC of 1.2). The onset of her low-blood counts acutely coincided with her cardiac arrest back on 5/31 and have remained chronically depressed since. She denies any sources of active bleeding. She does have some lightheadedness at the end of her PT sessions, but otherwise she does not. She does not have hemoptysis. No new rashes, bruising, or petechiae. No melena or hematochezia. Metatarsal joint point endorsed in bilateral hands, secondary to RA, but without any tenderness or new effusions in the large joints.     Notably, pt has been chronically treated with MTX for >12 years for RA. MTX was  "D/C on initial hospitalization 5/31 and has not be resumed since. In regards to her cyclical fevers of unknown origin, infectious work-up has been unfruitful to date with source, including negative blood and urine cx. There was initially suspicion the RUE cellulitis 2/2 IV site was contributing, however at this point in time with resolving infection, is a less likely source per ID.    PAST MEDICAL HISTORY:  Past Medical History:   Diagnosis Date     Hypertension      Rheumatoid arthritis(714.0)        Past Surgical History:   Procedure Laterality Date     ANESTHESIA CARDIOVERSION N/A 5/17/2017    Procedure: ANESTHESIA CARDIOVERSION;  ANESTHESIA CARDIOVERSION;  Surgeon: GENERIC ANESTHESIA PROVIDER;  Location: [facility]     ARTHRODESIS WRIST  2000    Right wrist     FOOT SURGERY      4 left and 2 right     RELEASE CARPAL TUNNEL BILATERAL       REPAIR VENTRICULAR SEPTAL DEFECT  1969       Social History     Social History     Marital status:      Spouse name: Romeo Michel     Number of children: 0     Years of education: N/A     Occupational History      [employer]     Social History Main Topics     Smoking status: Never Smoker     Smokeless tobacco: Never Used     Alcohol use Yes      Comment: Glass of wine two evenings a night     Drug use: No     Sexual activity: Not on file     Other Topics Concern     Parent/Sibling W/ Cabg, Mi Or Angioplasty Before 65f 55m? No     Social History Narrative       Family History   Problem Relation Age of Onset     Breast Cancer Mother      Hypertension Mother      Alzheimer Disease Mother      Hypertension Father      Blood Disease Father      Lymphoa     Circulatory Father      A Fib     DIABETES Paternal Grandmother        PHYSICAL EXAM:  /70 (BP Location: Right arm)  Pulse 126  Temp 97.9  F (36.6  C) (Axillary)  Resp 18  Ht 1.48 m (4' 10.25\")  Wt 75.1 kg (165 lb 8 oz)  SpO2 91%  BMI 34.29 kg/m2    Constitutional: Pleasant female resting " comfortably in bed in NAD. Alert and interactive.   HEENT: PERRL, EOMI, anicteric sclera. Oral mucosa pink and moist with no lesions or thrush.  Hematologic / Lymphatic: No overt bleeding. No cervical or clavicular adenopathy. 1-2 cm non-tender mass along right IJ site.  Respiratory: Non-labored breathing, good air exchange, lungs clear to auscultation bilaterally. No cough or wheeze noted.   Cardiovascular: Regularly irregular rhythm. Normal S1 and S2. No murmur or rub.   GI: Normoactive bowel sounds. Abdomen soft, non-distended, and non-tender. No palpable masses or organomegaly. No splenomegaly.  Genitourinary: Deferred.   Skin: Warm and dry. No concerning lesions or rash on exposed surfaces. No petechiae. PICC line in place in right basilic without oozing.  Musculoskeletal: Extremities grossly normal, non-tender, with 1+ pitting edema in bilateral lower extremities. Strong peripheral pulses. Good strength and ROM in bed.   Neurologic: A&O x 3, CNs 2-12 grossly intact, speech normal, sensation to light touch grossly WNL. Grossly non-focal.  Neuropsychiatric: Mentation and affect appear normal/appropriate.    Review of Systems  The 10 point Review of Systems is negative other than noted in the HPI or here.    Data  BMP    Recent Labs  Lab 07/07/17  0715 07/06/17  0704 07/05/17 1957 07/05/17  0654    137 137 136   POTASSIUM 3.5 3.7 4.0 3.7   CHLORIDE 99 101 101 101   VIKTORIYA 8.5 8.6 8.0* 8.1*   CO2 32 31 30 29   BUN 25 23 26 25   CR 0.56 0.53 0.59 0.57   * 89 130* 114*     CBC    Recent Labs  Lab 07/07/17  0715 07/06/17  1318 07/05/17  0654 07/04/17  0550   WBC 1.4* 1.7* 1.4* 1.5*   RBC 2.41* 2.74* 2.37* 2.91*   HGB 7.4* 8.4* 7.2* 8.7*   HCT 22.5* 25.6* 21.8* 26.6*   MCV 93 93 92 91   MCH 30.7 30.7 30.4 29.9   MCHC 32.9 32.8 33.0 32.7   RDW 22.7* 22.7* 22.5* 22.3*   PLT 27* 34* 27* 36*       Ferritin: 3622 (7/5/17)  Coags: INR: 1.32, PT: 32 (6/28)  Lupus AC: Negative (6/10)  HIT Panel: Negative  (6/5)    Blood Smear (7/6):   FINAL DIAGNOSIS:   Peripheral Blood Smear:     - Moderate normochromic, normocytic anemia; no increase in erythrocyte   regeneration; rare helmet cells (see comment)     - Marked leukopenia; neutropenia; lymphocytopenia     - Marked thrombocytopenia     Imaging:  No new imaging today.    Micro:  Blood cx: NGTD  UA: Negative leuk esterase and nitrites    Discussed the Assessment and Plan with Dr. Farah, who is in agreement with the plan.     Luc Topete, MS4  Hematology Medical Student  P: [phone]

## 2017-07-07 NOTE — PLAN OF CARE
Problem: Goal Outcome Summary  Goal: Goal Outcome Summary  Outcome: No Change  Pt re-admit from ARU due to Fever, Chills, concern for worsening LUE wound. Pt A/Ox4, VSS on 1L O2.Pt in Afib/flutter. MD notified that pt in sustained Aflutter at 120's, LA done (resulted 3.1), Albumin given, blood cultures done, needs urine sample, recheck LA in AM. MRI spine done,  Pt incontinent of urine many times. Repositioned pt frequently overnight. LUE dressing intact. Continue to monitor and notify MD of questions or concerns.

## 2017-07-07 NOTE — PROVIDER NOTIFICATION
Cards 1 notified of AM lab values: Plt 27, Hgb 7.4, WBC 1.4. Plt and Hgb decreased from 7/6.     Dm Bloom RN  [phone]

## 2017-07-07 NOTE — PROGRESS NOTES
"Cardiology progress note    Interval events:  She reports feeling further improved today with respect to ease of breathing and strength.  She ambulated in the hallway with physical therapy.  She was not febrile overnight although she took tylenol as a preventive measure when she thought she may feel a fever coming on.  MRI of the spine did not reveal an infection.    /70 (BP Location: Right arm)  Pulse 126  Temp 100.7  F (38.2  C) (Axillary)  Resp 18  Ht 1.48 m (4' 10.25\")  Wt 75.1 kg (165 lb 8 oz)  SpO2 94%  BMI 34.29 kg/m2  On physical examination she was alert, fully oriented, and appeared comfortable with unlabored breathing while lying in bed.  Her extremities were warm, with no mottling or cyanosis, and she still had 2+ pitting edema in both lower legs.  She has an irregularly irregular rhythm with a normal s1 and s2 and no murmur or rub.  Her lungs are clear to ascultation in the anterior and middle lobes bilaterally.  Her abdomen is soft and is not tender or distended.    Labs, imaging & procedures were reviewed.  Recent Labs   Lab Test  07/07/17   0715  07/06/17   0704  07/05/17   1957  07/05/17   0654   CR  0.56  0.53  0.59  0.57   POTASSIUM  3.5  3.7  4.0  3.7     Recent Labs   Lab Test  07/07/17   0715  07/06/17   1318  07/05/17   0654  07/04/17   0550   WBC  1.4*  1.7*  1.4*  1.5*   HGB  7.4*  8.4*  7.2*  8.7*   PLT  27*  34*  27*  36*         Impression:  1  Heart failure, non-ischemic cardiomyopathy, following VF cardiac arrest, LV EF 50-55%, still volume overloaded on examination and tolerating brisk diuresis without renal dysfunction.  2  Atrial fibrillation with rapid ventricular rates.  3  OOH VF arrest attributed to AF -> conversion pause -> early afterdepolarization  4  Fever up to 102 deg with no infectious, thrombotic, or malignant source identified to date.  She has RA but does not appear to be having a flare.  5  Pancytopenia, persistent despite discontinuing MTX and " antibiotics.  6  Malnutrition, improving, no longer requiring enteral nutrition.    Plan:  Continue diuresis with IV furosemide.  Request hematology's input regarding whether this degree of leukopenia can cause fevers (as neutropenia sometimes can) and whether treating the leukopenia may improve her fevers.    I discussed the patient with Dr. Tere Villarreal.    Minesh Cota MD  Cardiovascular disease fellow    ATTENDING ATTESTATION:  Patient has been seen and evaluated by me on July 7, 2017. I have reviewed the medications, laboratory, imaging, and other relevant results.  I agree with the above note which I have edited, as necessary.  I have discussed my assessment and plan with the house staff.    Tere Villarreal MD, MS  Staff Cardiologist, [facility]  Pager: [phone]

## 2017-07-08 ENCOUNTER — APPOINTMENT (OUTPATIENT)
Dept: PHYSICAL THERAPY | Facility: CLINIC | Age: 57
DRG: 871 | End: 2017-07-08
Attending: INTERNAL MEDICINE
Payer: COMMERCIAL

## 2017-07-08 ENCOUNTER — APPOINTMENT (OUTPATIENT)
Dept: OCCUPATIONAL THERAPY | Facility: CLINIC | Age: 57
DRG: 871 | End: 2017-07-08
Attending: INTERNAL MEDICINE
Payer: COMMERCIAL

## 2017-07-08 LAB
A PHAGOCYTOPH IGG TITR SER IF: NORMAL {TITER}
A PHAGOCYTOPH IGM TITR SER IF: NORMAL {TITER}
ANION GAP SERPL CALCULATED.3IONS-SCNC: 6 MMOL/L (ref 3–14)
ANION GAP SERPL CALCULATED.3IONS-SCNC: 8 MMOL/L (ref 3–14)
APTT PPP: 39 SEC (ref 22–37)
B BURGDOR IGG SER QL IB: NORMAL
B BURGDOR IGM SER QL IB: NORMAL
BACTERIA SPEC CULT: ABNORMAL
BACTERIA SPEC CULT: NO GROWTH
BACTERIA SPEC CULT: NO GROWTH
BASOPHILS # BLD AUTO: 0 10E9/L (ref 0–0.2)
BASOPHILS NFR BLD AUTO: 0.8 %
BUN SERPL-MCNC: 26 MG/DL (ref 7–30)
BUN SERPL-MCNC: 28 MG/DL (ref 7–30)
CALCIUM SERPL-MCNC: 8.2 MG/DL (ref 8.5–10.1)
CALCIUM SERPL-MCNC: 8.3 MG/DL (ref 8.5–10.1)
CHLORIDE SERPL-SCNC: 97 MMOL/L (ref 94–109)
CHLORIDE SERPL-SCNC: 98 MMOL/L (ref 94–109)
CO2 SERPL-SCNC: 29 MMOL/L (ref 20–32)
CO2 SERPL-SCNC: 30 MMOL/L (ref 20–32)
CREAT SERPL-MCNC: 0.52 MG/DL (ref 0.52–1.04)
CREAT SERPL-MCNC: 0.61 MG/DL (ref 0.52–1.04)
D DIMER PPP FEU-MCNC: 1.3 UG/ML FEU (ref 0–0.5)
DIFFERENTIAL METHOD BLD: ABNORMAL
EOSINOPHIL # BLD AUTO: 0 10E9/L (ref 0–0.7)
EOSINOPHIL NFR BLD AUTO: 0.4 %
ERYTHROCYTE [DISTWIDTH] IN BLOOD BY AUTOMATED COUNT: 23.2 % (ref 10–15)
FIBRINOGEN PPP-MCNC: 362 MG/DL (ref 200–420)
FOLATE SERPL-MCNC: 41.7 NG/ML
GFR SERPL CREATININE-BSD FRML MDRD: ABNORMAL ML/MIN/1.7M2
GFR SERPL CREATININE-BSD FRML MDRD: ABNORMAL ML/MIN/1.7M2
GLUCOSE SERPL-MCNC: 101 MG/DL (ref 70–99)
GLUCOSE SERPL-MCNC: 107 MG/DL (ref 70–99)
HCT VFR BLD AUTO: 26.5 % (ref 35–47)
HGB BLD-MCNC: 8.7 G/DL (ref 11.7–15.7)
IMM GRANULOCYTES # BLD: 0 10E9/L (ref 0–0.4)
IMM GRANULOCYTES NFR BLD: 0.4 %
INR PPP: 1.68 (ref 0.86–1.14)
LACTATE BLD-SCNC: 2.6 MMOL/L (ref 0.7–2.1)
LYMPHOCYTES # BLD AUTO: 0.4 10E9/L (ref 0.8–5.3)
LYMPHOCYTES NFR BLD AUTO: 14.3 %
MAGNESIUM SERPL-MCNC: 1.6 MG/DL (ref 1.6–2.3)
MAGNESIUM SERPL-MCNC: 2 MG/DL (ref 1.6–2.3)
MCH RBC QN AUTO: 30.4 PG (ref 26.5–33)
MCHC RBC AUTO-ENTMCNC: 32.8 G/DL (ref 31.5–36.5)
MCV RBC AUTO: 93 FL (ref 78–100)
MICRO REPORT STATUS: ABNORMAL
MICRO REPORT STATUS: NORMAL
MICRO REPORT STATUS: NORMAL
MONOCYTES # BLD AUTO: 0.4 10E9/L (ref 0–1.3)
MONOCYTES NFR BLD AUTO: 15.9 %
NEUTROPHILS # BLD AUTO: 1.8 10E9/L (ref 1.6–8.3)
NEUTROPHILS NFR BLD AUTO: 68.2 %
NRBC # BLD AUTO: 0 10*3/UL
NRBC BLD AUTO-RTO: 0 /100
PLATELET # BLD AUTO: 45 10E9/L (ref 150–450)
POTASSIUM SERPL-SCNC: 3.7 MMOL/L (ref 3.4–5.3)
POTASSIUM SERPL-SCNC: 3.8 MMOL/L (ref 3.4–5.3)
RBC # BLD AUTO: 2.86 10E12/L (ref 3.8–5.2)
SODIUM SERPL-SCNC: 134 MMOL/L (ref 133–144)
SODIUM SERPL-SCNC: 134 MMOL/L (ref 133–144)
SPECIMEN SOURCE: ABNORMAL
SPECIMEN SOURCE: NORMAL
SPECIMEN SOURCE: NORMAL
VIT B12 SERPL-MCNC: 753 PG/ML (ref 193–986)
WBC # BLD AUTO: 2.6 10E9/L (ref 4–11)

## 2017-07-08 PROCEDURE — 87075 CULTR BACTERIA EXCEPT BLOOD: CPT | Performed by: INTERNAL MEDICINE

## 2017-07-08 PROCEDURE — 21400006 ZZH R&B CCU INTERMEDIATE UMMC

## 2017-07-08 PROCEDURE — 97530 THERAPEUTIC ACTIVITIES: CPT | Mod: GP

## 2017-07-08 PROCEDURE — 25000125 ZZHC RX 250: Performed by: INTERNAL MEDICINE

## 2017-07-08 PROCEDURE — 25000132 ZZH RX MED GY IP 250 OP 250 PS 637

## 2017-07-08 PROCEDURE — 25000132 ZZH RX MED GY IP 250 OP 250 PS 637: Performed by: STUDENT IN AN ORGANIZED HEALTH CARE EDUCATION/TRAINING PROGRAM

## 2017-07-08 PROCEDURE — 83605 ASSAY OF LACTIC ACID: CPT | Performed by: INTERNAL MEDICINE

## 2017-07-08 PROCEDURE — 85025 COMPLETE CBC W/AUTO DIFF WBC: CPT | Performed by: INTERNAL MEDICINE

## 2017-07-08 PROCEDURE — 40000802 ZZH SITE CHECK

## 2017-07-08 PROCEDURE — 99232 SBSQ HOSP IP/OBS MODERATE 35: CPT | Mod: GC | Performed by: INTERNAL MEDICINE

## 2017-07-08 PROCEDURE — 97110 THERAPEUTIC EXERCISES: CPT | Mod: GO

## 2017-07-08 PROCEDURE — 82607 VITAMIN B-12: CPT | Performed by: INTERNAL MEDICINE

## 2017-07-08 PROCEDURE — 97116 GAIT TRAINING THERAPY: CPT | Mod: GP

## 2017-07-08 PROCEDURE — 40000193 ZZH STATISTIC PT WARD VISIT

## 2017-07-08 PROCEDURE — 97140 MANUAL THERAPY 1/> REGIONS: CPT | Mod: GO

## 2017-07-08 PROCEDURE — 85384 FIBRINOGEN ACTIVITY: CPT | Performed by: INTERNAL MEDICINE

## 2017-07-08 PROCEDURE — 36415 COLL VENOUS BLD VENIPUNCTURE: CPT | Performed by: INTERNAL MEDICINE

## 2017-07-08 PROCEDURE — 80048 BASIC METABOLIC PNL TOTAL CA: CPT | Performed by: STUDENT IN AN ORGANIZED HEALTH CARE EDUCATION/TRAINING PROGRAM

## 2017-07-08 PROCEDURE — 86480 TB TEST CELL IMMUN MEASURE: CPT | Performed by: INTERNAL MEDICINE

## 2017-07-08 PROCEDURE — 85610 PROTHROMBIN TIME: CPT | Performed by: INTERNAL MEDICINE

## 2017-07-08 PROCEDURE — 85730 THROMBOPLASTIN TIME PARTIAL: CPT | Performed by: INTERNAL MEDICINE

## 2017-07-08 PROCEDURE — 25000128 H RX IP 250 OP 636: Performed by: INTERNAL MEDICINE

## 2017-07-08 PROCEDURE — 25000125 ZZHC RX 250: Performed by: STUDENT IN AN ORGANIZED HEALTH CARE EDUCATION/TRAINING PROGRAM

## 2017-07-08 PROCEDURE — 82746 ASSAY OF FOLIC ACID SERUM: CPT | Performed by: INTERNAL MEDICINE

## 2017-07-08 PROCEDURE — 97535 SELF CARE MNGMENT TRAINING: CPT | Mod: GO

## 2017-07-08 PROCEDURE — 87389 HIV-1 AG W/HIV-1&-2 AB AG IA: CPT | Performed by: STUDENT IN AN ORGANIZED HEALTH CARE EDUCATION/TRAINING PROGRAM

## 2017-07-08 PROCEDURE — 40000133 ZZH STATISTIC OT WARD VISIT

## 2017-07-08 PROCEDURE — 82668 ASSAY OF ERYTHROPOIETIN: CPT | Performed by: INTERNAL MEDICINE

## 2017-07-08 PROCEDURE — 85379 FIBRIN DEGRADATION QUANT: CPT | Performed by: INTERNAL MEDICINE

## 2017-07-08 PROCEDURE — 25000132 ZZH RX MED GY IP 250 OP 250 PS 637: Performed by: NURSE PRACTITIONER

## 2017-07-08 PROCEDURE — 25000131 ZZH RX MED GY IP 250 OP 636 PS 637: Performed by: STUDENT IN AN ORGANIZED HEALTH CARE EDUCATION/TRAINING PROGRAM

## 2017-07-08 PROCEDURE — 87070 CULTURE OTHR SPECIMN AEROBIC: CPT | Performed by: INTERNAL MEDICINE

## 2017-07-08 PROCEDURE — 25000132 ZZH RX MED GY IP 250 OP 250 PS 637: Performed by: INTERNAL MEDICINE

## 2017-07-08 PROCEDURE — 83735 ASSAY OF MAGNESIUM: CPT | Performed by: STUDENT IN AN ORGANIZED HEALTH CARE EDUCATION/TRAINING PROGRAM

## 2017-07-08 PROCEDURE — 40000141 ZZH STATISTIC PERIPHERAL IV START W/O US GUIDANCE

## 2017-07-08 PROCEDURE — 36592 COLLECT BLOOD FROM PICC: CPT | Performed by: STUDENT IN AN ORGANIZED HEALTH CARE EDUCATION/TRAINING PROGRAM

## 2017-07-08 PROCEDURE — 36592 COLLECT BLOOD FROM PICC: CPT | Performed by: INTERNAL MEDICINE

## 2017-07-08 RX ORDER — ACETAMINOPHEN 325 MG/1
650 TABLET ORAL EVERY 8 HOURS
Status: DISCONTINUED | OUTPATIENT
Start: 2017-07-08 | End: 2017-07-15

## 2017-07-08 RX ORDER — POTASSIUM CHLORIDE 750 MG/1
40 TABLET, EXTENDED RELEASE ORAL DAILY
Status: DISCONTINUED | OUTPATIENT
Start: 2017-07-09 | End: 2017-07-14

## 2017-07-08 RX ADMIN — PREDNISONE 7.5 MG: 5 TABLET ORAL at 08:39

## 2017-07-08 RX ADMIN — PANTOPRAZOLE SODIUM 40 MG: 40 TABLET, DELAYED RELEASE ORAL at 06:00

## 2017-07-08 RX ADMIN — MICONAZOLE NITRATE: 2 POWDER TOPICAL at 08:47

## 2017-07-08 RX ADMIN — MICONAZOLE NITRATE: 2 POWDER TOPICAL at 19:47

## 2017-07-08 RX ADMIN — Medication 30 MG: at 08:38

## 2017-07-08 RX ADMIN — ACETAMINOPHEN 650 MG: 325 TABLET, FILM COATED ORAL at 22:39

## 2017-07-08 RX ADMIN — FOLIC ACID 1 MG: 1 TABLET ORAL at 08:39

## 2017-07-08 RX ADMIN — GABAPENTIN 200 MG: 100 CAPSULE ORAL at 13:51

## 2017-07-08 RX ADMIN — APIXABAN 2.5 MG: 2.5 TABLET, FILM COATED ORAL at 08:39

## 2017-07-08 RX ADMIN — SODIUM CHLORIDE, PRESERVATIVE FREE 5 ML: 5 INJECTION INTRAVENOUS at 09:08

## 2017-07-08 RX ADMIN — ACETAMINOPHEN 650 MG: 325 TABLET, FILM COATED ORAL at 01:14

## 2017-07-08 RX ADMIN — GABAPENTIN 200 MG: 100 CAPSULE ORAL at 21:31

## 2017-07-08 RX ADMIN — Medication 2 G: at 10:14

## 2017-07-08 RX ADMIN — ATENOLOL 75 MG: 50 TABLET ORAL at 08:41

## 2017-07-08 RX ADMIN — Medication 2 G: at 16:00

## 2017-07-08 RX ADMIN — MULTIPLE VITAMINS W/ MINERALS TAB 1 TABLET: TAB at 08:37

## 2017-07-08 RX ADMIN — ACETAMINOPHEN 650 MG: 325 TABLET, FILM COATED ORAL at 16:00

## 2017-07-08 RX ADMIN — GABAPENTIN 200 MG: 100 CAPSULE ORAL at 08:39

## 2017-07-08 RX ADMIN — MIDODRINE HYDROCHLORIDE 10 MG: 5 TABLET ORAL at 09:45

## 2017-07-08 RX ADMIN — MIDODRINE HYDROCHLORIDE 10 MG: 5 TABLET ORAL at 18:56

## 2017-07-08 RX ADMIN — CALCIUM CARBONATE 600 MG (1,500 MG)-VITAMIN D3 400 UNIT TABLET 2 TABLET: at 08:39

## 2017-07-08 RX ADMIN — POTASSIUM CHLORIDE 20 MEQ: 750 TABLET, EXTENDED RELEASE ORAL at 00:39

## 2017-07-08 RX ADMIN — MIDODRINE HYDROCHLORIDE 10 MG: 5 TABLET ORAL at 13:51

## 2017-07-08 RX ADMIN — FUROSEMIDE 40 MG: 10 INJECTION, SOLUTION INTRAVENOUS at 08:39

## 2017-07-08 RX ADMIN — DIGOXIN 125 MCG: 125 TABLET ORAL at 08:38

## 2017-07-08 RX ADMIN — ACETAMINOPHEN 650 MG: 325 TABLET, FILM COATED ORAL at 08:37

## 2017-07-08 RX ADMIN — POTASSIUM CHLORIDE 20 MEQ: 750 TABLET, EXTENDED RELEASE ORAL at 08:37

## 2017-07-08 RX ADMIN — Medication 2 G: at 00:39

## 2017-07-08 ASSESSMENT — PAIN DESCRIPTION - DESCRIPTORS: DESCRIPTORS: HEADACHE

## 2017-07-08 NOTE — PROGRESS NOTES
D/I/A: Pt s/p arrest.  Stable this evening-continues in afib/flutter.  Straight cath'd for urine, sample-sent to lab.  Hem saw pt today.  P: Continue with q 2-3 hour position/pad change.  Update md's with changes/concerns.

## 2017-07-08 NOTE — PLAN OF CARE
Problem: Goal Outcome Summary  Goal: Goal Outcome Summary  1. Pt will be free of fever/chills   Outcome: No Change  D/I/A: Pt s/p arrest.  Pt sepsis protocol flagged for WBC count and HR.  Pt with fever and sweating overnight.  Tylenol given.  Lactic and lytes drawn.  Mg and K replaced per protocol.  MD updated with lactic of 2.6 and here to assess pt.  Pt afib/flutter on monitor. Up with A1 and walker to commode-intermittently incontinent.    P: Continue with q 2-3 hour position/pad changes as needed.  Labs recheck this am.  ID and hem following.  Update md's with changes/concerns.

## 2017-07-08 NOTE — PLAN OF CARE
Problem: Goal Outcome Summary  Goal: Goal Outcome Summary  1. Pt will be free of fever/chills  Outcome: No Change  VSS, A&Ox4, Afib/flutter in 90s-140s. K+ 3.5, replaced. Low platelets/WBC/Hgb trending over last few days, team aware, hematology consult placed, no signs of active bleeding. At 1130, pt had 1 episode of chills/fever w/oral temp 98.3 and axillary 100.7, gave PRN tylenol, resolved w/in half hour, provider notified. LUE wound dressing changed w/medihoney, cavilon swab and morphine gel, base of wound yellow/green/moist, erythema/pink around edges, mepilex x2 applied. BLLE, flank/posterior edema +2-3, lymph wraps in place. Pt up w/asst x1, worked w/therapy walking in [place] w/walker and gait belt. Good appetite, no BM today. Pt incontinent of urine, measure output w/scale in room, adequate UOP. Plan to continue w/u for source of fever/chills, encourage activity, d/c to ARU when ready. Continue to monitor and notify Cards 1 w/changes or concerns.      Dm Bloom RN  Hours cared for: [phone]

## 2017-07-08 NOTE — PROVIDER NOTIFICATION
[facility] [place]    Sepsis Evaluation Progress Note    Date of Service: 07/08/2017    I was called to see Amelia Michel due to abnormal vital signs triggering the Sepsis SIRS screening alert. She is not known to have an infection.     Physical Exam    Vital Signs:  Temp: 99.2  F (37.3  C) Temp src: Axillary BP: 123/81 Pulse: 93 Heart Rate: 104 Resp: 16 SpO2: 92 % O2 Device: None (Room air) Oxygen Delivery: 1 LPM    Lab:  Lactic Acid   Date Value Ref Range Status   07/08/2017 2.6 (H) 0.7 - 2.1 mmol/L Final       The patient is at baseline mental status. She denies lightheadedness, fevers, chills, weakness or palpitations at this time.  She states she feels at baseline on exam.      The rest of their physical exam is significant for afib with rate of 120s, lungs CTAB with normal WOB.    Assessment and Plan    The SIRS and exam findings are likely due to cardiac, there is no sign of sepsis at this time.  She has known afib, rate at baseline with stable BP.  She is actively being diuresed, so will not give back IVF at this time given HD stability.  She is being worked up for an infection per chart review, although suspected that it may be non-bacterial.  She has been off abx x ~24 hours and has ID workup pending at this time for viral, fungal, parasitic infections.     Disposition: The patient will remain on the current unit. We will continue to monitor this patient closely.    Kevin Morgan MD   PGY-3,   Pager: [phone]

## 2017-07-08 NOTE — PLAN OF CARE
Problem: Goal Outcome Summary  Goal: Goal Outcome Summary  1. Pt will be free of fever/chills   PT / 6C - Pt very motivated to participate in PT this date. Pt performs bed mobility with CGA. Pt transfers sit <> stand with FWW + CGA. Pt ambulates a total of 180ft with FWW + CGA - pt with slow pace, takes multiple standing rest breaks, however no overt LOB. SpO2 on RA ranging 88-92% during ambulation, with rest and cues for PLB pt's SpO2 on RA recovers to 94-96%.  REC: ARU for continued therapy

## 2017-07-08 NOTE — PROGRESS NOTES
Cardiology Daily Note   Date of Service: 6/30/2017  Patient: Amelia Michel  MRN: [id number]  Admission Date: 6/19/2017  Hospital Day # 19    Assessment & Plan:   Amelia Michel is a 56 year old female with PMHx of VSD s/p repair (1969), RA, recently diagnosed atrial fibrillation that was complicated with an OOH cardiac arrest (felt 2/2 long pause with degeneration to VFib while converting Afib to SR with multiple AV prieto agents) who was readmitted from ARU on 6/19 with fever and concern for worsening LUE infection. She is now transferred back to cardiology service as primary on 6/26 for further workup and evaluation of recurrent lactic acidosis, persistent afib and softer blood pressures with tachypnea.     # Volume overload/anasarca:  # HFrEF (EF 50-55%):  # Hypotension:  Patient initially up 35# at admission; diuresis initially challenging, secondary to third spacing of fluid (albumin 1.9), but briskly responded with addition of midodrine and ongoing albumin resuscitation. Right heart cath 6/26 with mildly elevated right and left sided filling presures  - Gentle diuresis with furosemide, 40 mg IV daily     # Atrial fibrillation/flutter with RVR:  RVR in setting of volume overload above.  - Continue dig 125mcg  - Increased to atenolol 75 mg daily 7/6/17  -  D/C apixiban    # Out of hospital arrest:  Pt will need to wear LifeVest until medically appropriate and cleared from infection/wound standpoint for ICD implantation likely 4-6 weeks after discharge per EP (pending hospital course for LUE wound)    # Fever of unknown origin  Previously attributes to LUE wound, though patient has continued to have fevers despite broad spectrum antibiotics. ID consulted and feels that unlikely secondary to infection. Discontinued antibiotics with close monitoring. Patient without DVT, no indolent nidus of infection noted on MRI spine, LUE wound continues to improve without antibiotics.   - Patient neutropenic, not to  degree to warrant antibiotics for fever at present      # Acute on Chronic Thrombocytopenia:   2/2 hypoproliferative bone marrow. Plts noted to decrease from 104 to as low as 15 during recent hospitalization requiring transfusion w/ chronically low in the 's as outpatient. Possible bactrim is offending agent for exacerbation, and discontinued this medication in the setting of worsening cellulitis.   -- If this is chronic ITP, no need for treatment unless plts drift < 10s if not bleeding  -- Continue to hold RA meds for now  -- Apixaban 2.5 mg BID (held 6/26-6/27, resumed 6/28). This will need to be held 2 days prior to ICD placement after discharge (this will be deferred given acute infection).    # Acute on chronic anemia: Stable  No evidence of overt bleeding (stool light brown). T Bili normal.  - Daily CBC  - D/C apixiban    # RA:   History of seropositive rheumatoid arthritis (anti-CCP positive) since late 1980;s w/ multiple MTP osteotomies, partial right wrist fusion, longstanding therapy consisting of MTX, prednisone and HCQ  -- Continue with Prednisone taper. Now on 10 mg daily, taper to 7.5 mg on 7/1, then to 5 mg daily after 2 weeks if continues to have low disease activity  -- Continue to hold HCQ and MTX until outpatient follow-up  -- Follow-up with [facility] Dr. Conor Cunha in 4-6 weeks after discharge      # Severe Critical Illness Myopathy:   Significant deconditioning w/ median neuropathies most likely localized to the wrists and ulnar neuropathies most likely localized to the elbows secondary to RA.  -- Ongoing extensive PT/OT/SLP      # Malnutrition:   -- Discontinued tube feeds due to improved PO intake  -- Remove NG    Consulting Services: Nutrition, ID, heart failure service    CODE: Full Code  DVT Ppx: Apixaban resumed 6/28  Diet/Fluids: As above.  Disposition: Pending improvement in above.    Pt's care was discussed with bedside RN and patient during Care Team  "Rounds.    Patient was discussed and evaluated with Dr. Villarreal.    Kelly Luis  Internal Medicine PGY2  [phone]    ___________________________________________________________________    Subjective & Interval History:     Last 24 hr care team notes reviewed. Patient with episode of fever overnight. Continues to lack localizing symptoms (no cough, abdominal pain, diarrhea, rashes). Swelling is improving gradually, improved appetite.     4 point ROS otherwise negative.    Medications: Reviewed in EPIC. List below for reference    Physical Exam:    Blood pressure 98/79, pulse 93, temperature 97.9  F (36.6  C), temperature source Axillary, resp. rate 18, height 1.48 m (4' 10.25\"), weight 76.9 kg (169 lb 8 oz), SpO2 92 %, not currently breastfeeding.    CONSTITUTIONAL:  Ms. Michel is sitting up in bed in no apparent distress.  HEENT: MMM. EOMI.   LUNGS: Normal respiratory effort without increased work of breathing. No wheezes appreciated. Mild crackles in bilateral bases.   CARDIOVASCULAR: Irregularly irregular, tachycardic w/ no M/R/G.   ABDOMEN: Soft, nondistended, NT, BS +ve.   MUSCULOSKELETAL: Moves all four extremities without difficulty. Diffuse anasarca.  NEURO: A&Ox3, CN II-XII grossly intact, non-focal.     Labs & Studies of Note: Reviewed in Epic  CMP    Recent Labs  Lab 07/08/17  0733 07/07/17  2349 07/07/17  0715 07/06/17  0704  07/04/17  1940 07/04/17  0550 07/03/17  1956  07/02/17  0641    134 136 137  < > 136 136 136  < > 138   POTASSIUM 3.8 3.7 3.5 3.7  < > 4.0 3.8 3.9  < > 4.2   CHLORIDE 98 97 99 101  < > 100 101 102  < > 105   CO2 30 29 32 31  < > 28 26 28  < > 27   ANIONGAP 6 8 5 5  < > 8 9 6  < > 6   * 107* 100* 89  < > 191* 100* 149*  < > 118*   BUN 28 26 25 23  < > 26 29 34*  < > 36*   CR 0.61 0.52 0.56 0.53  < > 0.58 0.54 0.55  < > 0.66   GFRESTIMATED >90Non  GFR Calc >90Non  GFR Calc >90Non  GFR Calc >90Non  GFR Calc  " < > >90Non  GFR Calc >90Non  GFR Calc >90Non  GFR Calc  < > >90Non  GFR Calc   GFRESTBLACK >90African American GFR Calc >90African American GFR Calc >90African American GFR Calc >90African American GFR Calc  < > >90African American GFR Calc >90African American GFR Calc >90African American GFR Calc  < > >90African American GFR Calc   VIKTORIYA 8.2* 8.3* 8.5 8.6  < > 7.7* 8.1* 8.0*  < > 8.1*   MAG 2.0 1.6  --   --   --   --  2.4* 1.7  --   --    PHOS  --   --   --   --   --  2.9  --  2.4*  --   --    BILITOTAL  --   --   --   --   --   --   --   --   --  0.9   < > = values in this interval not displayed.    CBC    Recent Labs  Lab 07/08/17  1308 07/07/17  0715 07/06/17  1318 07/05/17  0654   WBC 2.6* 1.4* 1.7* 1.4*   RBC 2.86* 2.41* 2.74* 2.37*   HGB 8.7* 7.4* 8.4* 7.2*   HCT 26.5* 22.5* 25.6* 21.8*   PLT 45* 27* 34* 27*       INR    Recent Labs  Lab 07/08/17  0914   INR 1.68*     Medication List for Reference (delete if desired)  Current Facility-Administered Medications   Medication     atenolol (TENORMIN) tablet 75 mg     furosemide (LASIX) injection 40 mg     miconazole (MICATIN; MICRO GUARD) 2 % powder     predniSONE (DELTASONE) tablet 7.5 mg     midodrine (PROAMATINE) tablet 10 mg     lidocaine (LMX4) kit     calcium carbonate (TUMS) chewable tablet 500 mg     pantoprazole (PROTONIX) EC tablet 40 mg     digoxin (LANOXIN) tablet 125 mcg     ondansetron (ZOFRAN) tablet 4 mg     prochlorperazine (COMPAZINE) injection 5-10 mg     ondansetron (ZOFRAN) injection 4 mg     simethicone (MYLICON) chewable tablet 80 mg     sodium chloride (PF) 0.9% PF flush 10-20 mL     sodium chloride (PF) 0.9% PF flush 10 mL     heparin lock flush 10 UNIT/ML injection 5-10 mL     heparin lock flush 10 UNIT/ML injection 5-10 mL     dextrose 10 % 1,000 mL infusion     folic acid (FOLVITE) tablet 1 mg     gabapentin (NEURONTIN) capsule 200 mg     melatonin tablet 3 mg     morphine  0.1% in solosite topical gel 2 g     multivitamin, therapeutic with minerals (THERA-VIT-M) tablet 1 tablet     triamcinolone (KENALOG) 0.1 % ointment     lidocaine 1 % 1 mL     lidocaine (LMX4) kit     sodium chloride (PF) 0.9% PF flush 3 mL     sodium chloride (PF) 0.9% PF flush 3 mL     medication instruction     acetaminophen (TYLENOL) tablet 650 mg     acetaminophen (TYLENOL) Suppository 650 mg     Patient is already receiving anticoagulation with heparin, enoxaparin (LOVENOX), warfarin (COUMADIN)  or other anticoagulant medication     glucose 40 % gel 15-30 g    Or     dextrose 50 % injection 25-50 mL    Or     glucagon injection 1 mg     naloxone (NARCAN) injection 0.1-0.4 mg     calcium-vitamin D (CALTRATE) 600-400 MG-UNIT per tablet 2 tablet     co-enzyme Q-10 capsule 30 mg     morphine 0.1% in solosite topical gel     potassium chloride SA (K-DUR/KLOR-CON M) CR tablet 20-40 mEq     potassium chloride (KLOR-CON) Packet 20-40 mEq     potassium chloride 10 mEq in 100 mL intermittent infusion     potassium chloride 10 mEq in 100 mL intermittent infusion with 10 mg lidocaine     potassium chloride 20 mEq in 50 mL intermittent infusion     magnesium sulfate 2 g in NS intermittent infusion ([facility]ar[employer] or [facility]V Cmpd)     magnesium sulfate 4 g in 100 mL sterile water (premade)     sodium phosphate 10 mmol in D5W intermittent infusion     sodium phosphate 15 mmol in D5W intermittent infusion     sodium phosphate 20 mmol in D5W intermittent infusion     sodium phosphate 25 mmol in D5W intermittent infusion     melatonin tablet 1 mg     traMADol (ULTRAM) half-tab 25 mg     ATTENDING ATTESTATION:  Patient has been seen and evaluated by me on July 8, 2017. I have reviewed the medications, laboratory, imaging, and other relevant results.  I agree with the above note which I have edited, as necessary.  I have discussed my assessment and plan with the house staff.    Tere Villarreal MD, MS  Staff Cardiologist, [facility]  [place]  Pager: [phone]

## 2017-07-08 NOTE — PLAN OF CARE
Problem: Goal Outcome Summary  Goal: Goal Outcome Summary  1. Pt will be free of fever/chills   OT/6C:  Pt engages in standing AE on UE ergometer with focus on improving standing tolerance, strength, and cardiovascular endurance.  Pt completes 3 minutes of sustained cycling on UE ergometer with SBA at bedside table, performs X3 bouts with 1-2 minutes of seated rest between each bout of exercise.  Pt hemodynamically stable throughout on RA.  SBA+FWW for BSC transfer and max A for thorough mary cares after pt attempt. Limited by strength, endurance, and fatigue.       REC:  ARU when medically stable.

## 2017-07-08 NOTE — PLAN OF CARE
Problem: Goal Outcome Summary  Goal: Goal Outcome Summary  1. Pt will be free of fever/chills   OT/EDEMA 6C: Patient tolerating wear of gradient compression bandaging well according to set 48 hour schedule and with 2+ soft pitting primarily located in dorsum of feet. To aid in fluid movement, wraps reapplied with foam inserts from MTP to knee on (R) LE and from MTP to groin on (L) LE. Wraps may be worn x48 hours, however, please remove if increased pain, numbness/tingling or soiling occur.

## 2017-07-08 NOTE — PLAN OF CARE
Problem: Goal Outcome Summary  Goal: Goal Outcome Summary  1. Pt will be free of fever/chills   Pt alert and oriented.  VSS on room air.  A-fib/flutter rate 's, occ. PVC.  PRN tylenol for 100.1 temp this AM, now scheduled.  Mg (2.0) and K+ (3.8) replaced per protocol.  Lymph wraps bilateral LE.  LUE wound changed per POC.  Pt ambulating halls w/ therapy.  Continue w/ POC and notify team w/ changes/concerns.

## 2017-07-09 ENCOUNTER — APPOINTMENT (OUTPATIENT)
Dept: PHYSICAL THERAPY | Facility: CLINIC | Age: 57
DRG: 871 | End: 2017-07-09
Attending: INTERNAL MEDICINE
Payer: COMMERCIAL

## 2017-07-09 ENCOUNTER — APPOINTMENT (OUTPATIENT)
Dept: OCCUPATIONAL THERAPY | Facility: CLINIC | Age: 57
DRG: 871 | End: 2017-07-09
Attending: INTERNAL MEDICINE
Payer: COMMERCIAL

## 2017-07-09 LAB
ANION GAP SERPL CALCULATED.3IONS-SCNC: 10 MMOL/L (ref 3–14)
ANION GAP SERPL CALCULATED.3IONS-SCNC: 8 MMOL/L (ref 3–14)
BUN SERPL-MCNC: 24 MG/DL (ref 7–30)
BUN SERPL-MCNC: 26 MG/DL (ref 7–30)
CALCIUM SERPL-MCNC: 7.9 MG/DL (ref 8.5–10.1)
CALCIUM SERPL-MCNC: 8.3 MG/DL (ref 8.5–10.1)
CHLORIDE SERPL-SCNC: 100 MMOL/L (ref 94–109)
CHLORIDE SERPL-SCNC: 99 MMOL/L (ref 94–109)
CO2 SERPL-SCNC: 27 MMOL/L (ref 20–32)
CO2 SERPL-SCNC: 28 MMOL/L (ref 20–32)
CREAT SERPL-MCNC: 0.62 MG/DL (ref 0.52–1.04)
CREAT SERPL-MCNC: 0.67 MG/DL (ref 0.52–1.04)
EPO SERPL-ACNC: 46
GFR SERPL CREATININE-BSD FRML MDRD: ABNORMAL ML/MIN/1.7M2
GFR SERPL CREATININE-BSD FRML MDRD: ABNORMAL ML/MIN/1.7M2
GLUCOSE SERPL-MCNC: 188 MG/DL (ref 70–99)
GLUCOSE SERPL-MCNC: 98 MG/DL (ref 70–99)
MAGNESIUM SERPL-MCNC: 2 MG/DL (ref 1.6–2.3)
PHOSPHATE SERPL-MCNC: 2.7 MG/DL (ref 2.5–4.5)
POTASSIUM SERPL-SCNC: 3.7 MMOL/L (ref 3.4–5.3)
POTASSIUM SERPL-SCNC: 3.7 MMOL/L (ref 3.4–5.3)
SODIUM SERPL-SCNC: 135 MMOL/L (ref 133–144)
SODIUM SERPL-SCNC: 136 MMOL/L (ref 133–144)

## 2017-07-09 PROCEDURE — 40000133 ZZH STATISTIC OT WARD VISIT

## 2017-07-09 PROCEDURE — 97530 THERAPEUTIC ACTIVITIES: CPT | Mod: GP

## 2017-07-09 PROCEDURE — 25000132 ZZH RX MED GY IP 250 OP 250 PS 637

## 2017-07-09 PROCEDURE — 83735 ASSAY OF MAGNESIUM: CPT | Performed by: STUDENT IN AN ORGANIZED HEALTH CARE EDUCATION/TRAINING PROGRAM

## 2017-07-09 PROCEDURE — 97535 SELF CARE MNGMENT TRAINING: CPT | Mod: GO

## 2017-07-09 PROCEDURE — 25000131 ZZH RX MED GY IP 250 OP 636 PS 637: Performed by: STUDENT IN AN ORGANIZED HEALTH CARE EDUCATION/TRAINING PROGRAM

## 2017-07-09 PROCEDURE — 97116 GAIT TRAINING THERAPY: CPT | Mod: GP

## 2017-07-09 PROCEDURE — 25000132 ZZH RX MED GY IP 250 OP 250 PS 637: Performed by: INTERNAL MEDICINE

## 2017-07-09 PROCEDURE — 25000132 ZZH RX MED GY IP 250 OP 250 PS 637: Performed by: NURSE PRACTITIONER

## 2017-07-09 PROCEDURE — 36415 COLL VENOUS BLD VENIPUNCTURE: CPT | Performed by: STUDENT IN AN ORGANIZED HEALTH CARE EDUCATION/TRAINING PROGRAM

## 2017-07-09 PROCEDURE — 99232 SBSQ HOSP IP/OBS MODERATE 35: CPT | Mod: GC | Performed by: INTERNAL MEDICINE

## 2017-07-09 PROCEDURE — 97530 THERAPEUTIC ACTIVITIES: CPT | Mod: GO

## 2017-07-09 PROCEDURE — 40000193 ZZH STATISTIC PT WARD VISIT

## 2017-07-09 PROCEDURE — 25000125 ZZHC RX 250: Performed by: STUDENT IN AN ORGANIZED HEALTH CARE EDUCATION/TRAINING PROGRAM

## 2017-07-09 PROCEDURE — 25000128 H RX IP 250 OP 636: Performed by: INTERNAL MEDICINE

## 2017-07-09 PROCEDURE — 80048 BASIC METABOLIC PNL TOTAL CA: CPT | Performed by: STUDENT IN AN ORGANIZED HEALTH CARE EDUCATION/TRAINING PROGRAM

## 2017-07-09 PROCEDURE — 36415 COLL VENOUS BLD VENIPUNCTURE: CPT | Performed by: INTERNAL MEDICINE

## 2017-07-09 PROCEDURE — 25000132 ZZH RX MED GY IP 250 OP 250 PS 637: Performed by: STUDENT IN AN ORGANIZED HEALTH CARE EDUCATION/TRAINING PROGRAM

## 2017-07-09 PROCEDURE — 97110 THERAPEUTIC EXERCISES: CPT | Mod: GP

## 2017-07-09 PROCEDURE — 21400006 ZZH R&B CCU INTERMEDIATE UMMC

## 2017-07-09 PROCEDURE — 80048 BASIC METABOLIC PNL TOTAL CA: CPT | Performed by: INTERNAL MEDICINE

## 2017-07-09 PROCEDURE — 84100 ASSAY OF PHOSPHORUS: CPT | Performed by: STUDENT IN AN ORGANIZED HEALTH CARE EDUCATION/TRAINING PROGRAM

## 2017-07-09 RX ORDER — MIDODRINE HYDROCHLORIDE 5 MG/1
10 TABLET ORAL 2 TIMES DAILY WITH MEALS
Status: DISCONTINUED | OUTPATIENT
Start: 2017-07-09 | End: 2017-07-13

## 2017-07-09 RX ORDER — FUROSEMIDE 10 MG/ML
20 INJECTION INTRAMUSCULAR; INTRAVENOUS ONCE
Status: COMPLETED | OUTPATIENT
Start: 2017-07-09 | End: 2017-07-09

## 2017-07-09 RX ADMIN — DIGOXIN 125 MCG: 125 TABLET ORAL at 08:09

## 2017-07-09 RX ADMIN — ATENOLOL 75 MG: 50 TABLET ORAL at 08:09

## 2017-07-09 RX ADMIN — MIDODRINE HYDROCHLORIDE 10 MG: 5 TABLET ORAL at 17:47

## 2017-07-09 RX ADMIN — MICONAZOLE NITRATE: 2 POWDER TOPICAL at 21:44

## 2017-07-09 RX ADMIN — Medication 30 MG: at 08:29

## 2017-07-09 RX ADMIN — GABAPENTIN 200 MG: 100 CAPSULE ORAL at 14:59

## 2017-07-09 RX ADMIN — POTASSIUM CHLORIDE 20 MEQ: 750 TABLET, EXTENDED RELEASE ORAL at 14:59

## 2017-07-09 RX ADMIN — TRIAMCINOLONE ACETONIDE: 1 OINTMENT TOPICAL at 08:13

## 2017-07-09 RX ADMIN — POTASSIUM CHLORIDE 20 MEQ: 750 TABLET, EXTENDED RELEASE ORAL at 21:45

## 2017-07-09 RX ADMIN — CALCIUM CARBONATE 600 MG (1,500 MG)-VITAMIN D3 400 UNIT TABLET 2 TABLET: at 08:29

## 2017-07-09 RX ADMIN — Medication 2 G: at 15:14

## 2017-07-09 RX ADMIN — FUROSEMIDE 40 MG: 10 INJECTION, SOLUTION INTRAVENOUS at 08:10

## 2017-07-09 RX ADMIN — POTASSIUM CHLORIDE 40 MEQ: 750 TABLET, EXTENDED RELEASE ORAL at 09:34

## 2017-07-09 RX ADMIN — ACETAMINOPHEN 650 MG: 325 TABLET, FILM COATED ORAL at 14:59

## 2017-07-09 RX ADMIN — ACETAMINOPHEN 650 MG: 325 TABLET, FILM COATED ORAL at 21:45

## 2017-07-09 RX ADMIN — PANTOPRAZOLE SODIUM 40 MG: 40 TABLET, DELAYED RELEASE ORAL at 06:04

## 2017-07-09 RX ADMIN — PREDNISONE 7.5 MG: 5 TABLET ORAL at 08:10

## 2017-07-09 RX ADMIN — ACETAMINOPHEN 650 MG: 325 TABLET, FILM COATED ORAL at 06:04

## 2017-07-09 RX ADMIN — FOLIC ACID 1 MG: 1 TABLET ORAL at 08:29

## 2017-07-09 RX ADMIN — MIDODRINE HYDROCHLORIDE 10 MG: 5 TABLET ORAL at 08:09

## 2017-07-09 RX ADMIN — GABAPENTIN 200 MG: 100 CAPSULE ORAL at 08:29

## 2017-07-09 RX ADMIN — MICONAZOLE NITRATE: 2 POWDER TOPICAL at 08:13

## 2017-07-09 RX ADMIN — GABAPENTIN 200 MG: 100 CAPSULE ORAL at 21:45

## 2017-07-09 RX ADMIN — MULTIPLE VITAMINS W/ MINERALS TAB 1 TABLET: TAB at 08:29

## 2017-07-09 RX ADMIN — FUROSEMIDE 20 MG: 10 INJECTION, SOLUTION INTRAVENOUS at 15:14

## 2017-07-09 NOTE — PROGRESS NOTES
"Hematology Progress Note      ASSESSMENT:     Bree Michel is a 57 yo female with PMHx of VSD, RA (on chronic methotrexate), and a recently diagnosed Afib resulting in cardiac arrest on 5/31, presenting with pancytopenia and fever of unknown origin.     She seems to have some baseline mild pancytopenia but her counts clearly dropped immediately following her cardiac arrest. In the setting of long-term methotrexate use it is possible that cardiac arrest likely caused bone-marrow shock infraction and some amount of failure. A smouldering primary bone marrow malignancy (e.g. MDS, leukemia, etc.) or bone marrow failure syndromes like aplastic anemia is also likely. As her infectious work up comes back neg, we should definitely rule out a primary bone marrow disease and before consideration for EPO or TPO.      RECOMMENDATIONS:  -Bone marrow biopsy in the next week  -Plan to send BM flow-cytometry and cytogentics for prognosis and guiding treatment  -Goal Hb >7 gm  -Goal Plt count 10K if no bleeding or 50K if active bleeding  -F/u labs (B12, Folate, MMA, EPO, coags and DIC panel)  -Recommend D/C apixiban for Afib anticoagulation given severe thrombocytopenia that will increase the risk for bleeding  -Monitor for bleeding     Pt seen and discussed with Dr. Silverman who agrees with the plan. Discussed with patients and family who are in agreement with the plan.      Fox Salinas MD  Hematology Oncology fellow  [phone]        Subjective: Pt feels better. She wants to be discharged soon. No new complaints.    Vital signs:  Temp: 97.7  F (36.5  C) Temp src: Axillary BP: 102/84 (MAP 93)   Heart Rate: 77 Resp: 18 SpO2: 93 % O2 Device: None (Room air) Oxygen Delivery: 2 LPM Height: 148 cm (4' 10.25\") Weight: 76.9 kg (169 lb 8 oz)  Estimated body mass index is 35.12 kg/(m^2) as calculated from the following:    Height as of this encounter: 1.48 m (4' 10.25\").    Weight as of this encounter: 76.9 kg (169 lb 8 oz).      Gen: Well " built and nourished,  lady  HEENT: MMM, no oral lesions, no pallor, icterus  Neck: supple,   Lymph: no cervical, sc, axillary LAD   CV: RRR, no murmurs  Resp: CTA  Abd: Soft, NTND, no HSM   Ext: no edema   Neuro: AAOx4. Cranial nerves grossly intact. Strength 5/5 throughout.  Normal muscle tone. Sensations grossly intact. 3+ symmetric reflexes in Knee jt     Lab Results   Component Value Date    WBC 2.6 07/08/2017     Lab Results   Component Value Date    RBC 2.86 07/08/2017     Lab Results   Component Value Date    HGB 8.7 07/08/2017     Lab Results   Component Value Date    HCT 26.5 07/08/2017     No components found for: MCT  Lab Results   Component Value Date    MCV 93 07/08/2017     Lab Results   Component Value Date    MCH 30.4 07/08/2017     Lab Results   Component Value Date    MCHC 32.8 07/08/2017     Lab Results   Component Value Date    RDW 23.2 07/08/2017     Lab Results   Component Value Date    PLT 45 07/08/2017     Last Basic Metabolic Panel:  Lab Results   Component Value Date     07/08/2017      Lab Results   Component Value Date    POTASSIUM 3.8 07/08/2017     Lab Results   Component Value Date    CHLORIDE 98 07/08/2017     Lab Results   Component Value Date    VIKTORIYA 8.2 07/08/2017     Lab Results   Component Value Date    CO2 30 07/08/2017     Lab Results   Component Value Date    BUN 28 07/08/2017     Lab Results   Component Value Date    CR 0.61 07/08/2017     Lab Results   Component Value Date     07/08/2017

## 2017-07-09 NOTE — PLAN OF CARE
Problem: Goal Outcome Summary  Goal: Goal Outcome Summary  1. Pt will be free of fever/chills   PT / 6C - Pt performs flat bed mobility with CGA-SBA. Pt transfers sit <> stand with FWW + CGA. Pt ambulates 150ft with FWW + CGA - pt needing cues for improved gait mechanics and takes standing rest breaks 2/2 fatigue. Pt performs NuStep for 6 continuous minutes at Level 1, avg SPM 25, OMNI 6. VSS on 2L O2 during session. Pt most limited by fatigue and upset stomach this date.  REC: ARU for continued therapy

## 2017-07-09 NOTE — PLAN OF CARE
Problem: Goal Outcome Summary  Goal: Goal Outcome Summary  1. Pt will be free of fever/chills   Outcome: No Change  VSS. Denies pain. Afib/flutter. No new concerns this shift. Will continue to monitor.

## 2017-07-09 NOTE — PLAN OF CARE
Problem: Goal Outcome Summary  Goal: Goal Outcome Summary  1. Pt will be free of fever/chills   OT/6C:  Instructed pt on adaptive equipment for LB ADLs including reacher for donning pants/doffing socks and toilet tongs for mary cares.  Pt engages in X2 bouts of standing ADLs at sink with SBA and set up, tolerates ~3 minutes each bout, hemodynamically stable throughout on 2L.  Pt making daily progress with therapies, continues to be motivated to return to PLOF and has excellent support from her .       REC:  ARU when medically appropriate

## 2017-07-09 NOTE — PLAN OF CARE
Problem: Goal Outcome Summary  Goal: Goal Outcome Summary  1. Pt will be free of fever/chills   Outcome: No Change  D/I/A: Pt stable this evening-denies pain.  Pad and repositioning changes.  ID and heme following pt.  P: Plan for BM bx this week.  Update md's with changes/concerns.

## 2017-07-09 NOTE — PROGRESS NOTES
Cardiology Daily Note   Date of Service: 7/9/2017  Patient: Amelia Michel  MRN: [id number]  Admission Date: 6/19/2017  Hospital Day # 20    Assessment & Plan:   Amelia Michel is a 56 year old female with PMHx of VSD s/p repair (1969), RA, recently diagnosed atrial fibrillation that was complicated with an OOH cardiac arrest (felt 2/2 long pause with degeneration to VFib while converting Afib to SR with multiple AV prieto agents) who was readmitted from ARU on 6/19 with fever and concern for worsening LUE infection. She is now transferred back to cardiology service as primary on 6/26 for further workup and evaluation of recurrent lactic acidosis, persistent afib and softer blood pressures with tachypnea.     # Volume overload/anasarca:  # HFrEF (EF 50-55%):  # Hypotension:  Patient initially up 35# at admission; diuresis initially challenging, secondary to third spacing of fluid (albumin 1.9), but briskly responded with addition of midodrine and ongoing albumin resuscitation. Right heart cath 6/26 with mildly elevated right and left sided filling presures  - Gentle diuresis with furosemide, 60 mg 7/9/17  - Decrease midodrine to 10 mg BID (from 10 mg TID)    # Atrial fibrillation/flutter with RVR:  RVR in setting of volume overload above.  - Continue dig 125mcg  - Atenolol 75 mg daily   - Not anticoagulated due to thrombocytopenia    # Out of hospital arrest:  Pt will need to wear LifeVest until medically appropriate and cleared from infection/wound standpoint for ICD implantation likely 4-6 weeks after discharge per EP (pending hospital course for LUE wound)    # Fever of unknown origin  Previously attributes to LUE wound, though patient has continued to have fevers despite broad spectrum antibiotics. ID consulted and feels that unlikely secondary to infection. Discontinued antibiotics with close monitoring. Patient without DVT, no indolent nidus of infection noted on MRI spine, LUE wound continues to  improve without antibiotics.   - Patient neutropenic, not to degree to warrant antibiotics for fever at present      # Acute on Chronic Thrombocytopenia:   2/2 hypoproliferative bone marrow. Plts noted to decrease from 104 to as low as 15 during recent hospitalization requiring transfusion w/ chronically low in the 's as outpatient. Possible bactrim is offending agent for exacerbation, and discontinued this medication in the setting of worsening cellulitis.   -- If this is chronic ITP, no need for treatment unless plts drift < 10s if not bleeding  -- Continue to hold RA meds for now  -- D/C apixiban (last dose 7/8/17)    # Acute on chronic anemia: Stable  No evidence of overt bleeding (stool light brown). T Bili normal.  - Daily CBC    # RA:   History of seropositive rheumatoid arthritis (anti-CCP positive) since late 1980;s w/ multiple MTP osteotomies, partial right wrist fusion, longstanding therapy consisting of MTX, prednisone and HCQ  -- Continue with Prednisone taper. Now on 10 mg daily, taper to 7.5 mg on 7/1 then to 5 mg daily after 2 weeks if continues to have low disease activity  -- Continue to hold HCQ and MTX until outpatient follow-up  -- Follow-up with [facility] Dr. Conor Cunha in 4-6 weeks after discharge      # Severe Critical Illness Myopathy:   Significant deconditioning w/ median neuropathies most likely localized to the wrists and ulnar neuropathies most likely localized to the elbows secondary to RA.  -- Ongoing extensive PT/OT/SLP      # Malnutrition:   -- Discontinued tube feeds due to improved PO intake  -- Remove NG    Consulting Services: Nutrition, ID, heart failure service    CODE: Full Code  DVT Ppx: Apixaban resumed 6/28  Diet/Fluids: As above.  Disposition: Pending improvement in above.    Pt's care was discussed with bedside RN and patient during Care Team Rounds.    Patient was discussed and evaluated with Dr. Villarreal.    Kelly Smith  Internal Medicine  "PGY2  [phone]    ___________________________________________________________________    Subjective & Interval History:     Last 24 hr care team notes reviewed. Patient with episode of fever this morning. No joint pain. No rashes. Breathing is improving, off of oxygen overnight. Swelling is improving.     4 point ROS otherwise negative.    Medications: Reviewed in EPIC. List below for reference    Physical Exam:    Blood pressure 102/69, pulse 93, temperature 96.5  F (35.8  C), temperature source Axillary, resp. rate 16, height 1.48 m (4' 10.25\"), weight 76.2 kg (168 lb 1.6 oz), SpO2 98 %, not currently breastfeeding.    CONSTITUTIONAL:  Ms. Michel is sitting up in bed in no apparent distress.  HEENT: MMM. EOMI. +JVD to midway to angle of the jaw.  LUNGS: Normal respiratory effort without increased work of breathing. No wheezes appreciated. Mild crackles in bilateral bases.   CARDIOVASCULAR: Irregularly irregular, tachycardic w/ no M/R/G.   ABDOMEN: Soft, nondistended, NT, BS +ve.   MUSCULOSKELETAL: Moves all four extremities without difficulty. Diffuse anasarca.  NEURO: A&Ox3, CN II-XII grossly intact, non-focal.     Labs & Studies of Note: Reviewed in Epic  CMP    Recent Labs  Lab 07/09/17  0758 07/08/17  0733 07/07/17  2349 07/07/17  0715  07/04/17  1940 07/04/17  0550 07/03/17  1956    134 134 136  < > 136 136 136   POTASSIUM 3.7 3.8 3.7 3.5  < > 4.0 3.8 3.9   CHLORIDE 100 98 97 99  < > 100 101 102   CO2 28 30 29 32  < > 28 26 28   ANIONGAP 8 6 8 5  < > 8 9 6   GLC 98 101* 107* 100*  < > 191* 100* 149*   BUN 26 28 26 25  < > 26 29 34*   CR 0.62 0.61 0.52 0.56  < > 0.58 0.54 0.55   GFRESTIMATED >90Non  GFR Calc >90Non  GFR Calc >90Non  GFR Calc >90Non  GFR Calc  < > >90Non  GFR Calc >90Non  GFR Calc >90Non  GFR Calc   GFRESTBLACK >90African American GFR Calc >90African American GFR Calc " >90African American GFR Calc >90African American GFR Calc  < > >90African American GFR Calc >90African American GFR Calc >90African American GFR Calc   VIKTORIYA 8.3* 8.2* 8.3* 8.5  < > 7.7* 8.1* 8.0*   MAG 2.0 2.0 1.6  --   --   --  2.4* 1.7   PHOS 2.7  --   --   --   --  2.9  --  2.4*   < > = values in this interval not displayed.    CBC    Recent Labs  Lab 07/08/17  1308 07/07/17  0715 07/06/17  1318 07/05/17  0654   WBC 2.6* 1.4* 1.7* 1.4*   RBC 2.86* 2.41* 2.74* 2.37*   HGB 8.7* 7.4* 8.4* 7.2*   HCT 26.5* 22.5* 25.6* 21.8*   PLT 45* 27* 34* 27*       INR    Recent Labs  Lab 07/08/17  0914   INR 1.68*     Medication List for Reference (delete if desired)  Current Facility-Administered Medications   Medication     midodrine (PROAMATINE) tablet 10 mg     acetaminophen (TYLENOL) tablet 650 mg     potassium chloride SA (K-DUR/KLOR-CON M) CR tablet 40 mEq     atenolol (TENORMIN) tablet 75 mg     furosemide (LASIX) injection 40 mg     miconazole (MICATIN; MICRO GUARD) 2 % powder     predniSONE (DELTASONE) tablet 7.5 mg     lidocaine (LMX4) kit     calcium carbonate (TUMS) chewable tablet 500 mg     pantoprazole (PROTONIX) EC tablet 40 mg     digoxin (LANOXIN) tablet 125 mcg     ondansetron (ZOFRAN) tablet 4 mg     prochlorperazine (COMPAZINE) injection 5-10 mg     ondansetron (ZOFRAN) injection 4 mg     simethicone (MYLICON) chewable tablet 80 mg     sodium chloride (PF) 0.9% PF flush 10-20 mL     sodium chloride (PF) 0.9% PF flush 10 mL     heparin lock flush 10 UNIT/ML injection 5-10 mL     heparin lock flush 10 UNIT/ML injection 5-10 mL     dextrose 10 % 1,000 mL infusion     folic acid (FOLVITE) tablet 1 mg     gabapentin (NEURONTIN) capsule 200 mg     melatonin tablet 3 mg     morphine 0.1% in solosite topical gel 2 g     multivitamin, therapeutic with minerals (THERA-VIT-M) tablet 1 tablet     triamcinolone (KENALOG) 0.1 % ointment     lidocaine 1 % 1 mL     lidocaine (LMX4) kit     sodium chloride (PF) 0.9% PF flush  3 mL     sodium chloride (PF) 0.9% PF flush 3 mL     medication instruction     acetaminophen (TYLENOL) Suppository 650 mg     Patient is already receiving anticoagulation with heparin, enoxaparin (LOVENOX), warfarin (COUMADIN)  or other anticoagulant medication     glucose 40 % gel 15-30 g    Or     dextrose 50 % injection 25-50 mL    Or     glucagon injection 1 mg     naloxone (NARCAN) injection 0.1-0.4 mg     calcium-vitamin D (CALTRATE) 600-400 MG-UNIT per tablet 2 tablet     co-enzyme Q-10 capsule 30 mg     morphine 0.1% in solosite topical gel     potassium chloride SA (K-DUR/KLOR-CON M) CR tablet 20-40 mEq     potassium chloride (KLOR-CON) Packet 20-40 mEq     potassium chloride 10 mEq in 100 mL intermittent infusion     potassium chloride 10 mEq in 100 mL intermittent infusion with 10 mg lidocaine     potassium chloride 20 mEq in 50 mL intermittent infusion     magnesium sulfate 2 g in NS intermittent infusion (PharMEDium or FV Cmpd)     magnesium sulfate 4 g in 100 mL sterile water (premade)     sodium phosphate 10 mmol in D5W intermittent infusion     sodium phosphate 15 mmol in D5W intermittent infusion     sodium phosphate 20 mmol in D5W intermittent infusion     sodium phosphate 25 mmol in D5W intermittent infusion     melatonin tablet 1 mg     traMADol (ULTRAM) half-tab 25 mg     ATTENDING ATTESTATION:  Patient has been seen and evaluated by me on July 9, 2017. I have reviewed the medications, laboratory, imaging, and other relevant results.  I agree with the above note which I have edited, as necessary.  I have discussed my assessment and plan with the house staff.    Tere Villarreal MD, MS  Staff Cardiologist, [facility]  Pager: [phone]    t

## 2017-07-09 NOTE — PLAN OF CARE
Problem: Goal Outcome Summary  Goal: Goal Outcome Summary  1. Pt will be free of fever/chills   Pt alert and oriented.  VSS on room air/2L NC at times for comfort.  A-fib/flutter rate , occ. PVC. Scheduled tylenol.  K+ (3.7) replaced per protocol.  Lymph wraps bilateral LE.  LUE wound changed per POC.  Pt ambulating halls w/ therapy.  Likely bone marrow biopsy Wednesday.  Continue w/ POC and notify team w/ changes/concerns.

## 2017-07-10 ENCOUNTER — APPOINTMENT (OUTPATIENT)
Dept: PHYSICAL THERAPY | Facility: CLINIC | Age: 57
DRG: 871 | End: 2017-07-10
Attending: INTERNAL MEDICINE
Payer: COMMERCIAL

## 2017-07-10 ENCOUNTER — APPOINTMENT (OUTPATIENT)
Dept: CARDIOLOGY | Facility: CLINIC | Age: 57
DRG: 871 | End: 2017-07-10
Attending: INTERNAL MEDICINE
Payer: COMMERCIAL

## 2017-07-10 ENCOUNTER — APPOINTMENT (OUTPATIENT)
Dept: OCCUPATIONAL THERAPY | Facility: CLINIC | Age: 57
DRG: 871 | End: 2017-07-10
Attending: INTERNAL MEDICINE
Payer: COMMERCIAL

## 2017-07-10 LAB
ALBUMIN UR-MCNC: 10 MG/DL
ANION GAP SERPL CALCULATED.3IONS-SCNC: 7 MMOL/L (ref 3–14)
APPEARANCE UR: CLEAR
APTT PPP: 32 SEC (ref 22–37)
BACTERIA SPEC CULT: NO GROWTH
BASOPHILS # BLD AUTO: 0 10E9/L (ref 0–0.2)
BASOPHILS NFR BLD AUTO: 1.2 %
BILIRUB UR QL STRIP: NEGATIVE
BUN SERPL-MCNC: 24 MG/DL (ref 7–30)
CALCIUM SERPL-MCNC: 8.1 MG/DL (ref 8.5–10.1)
CHLORIDE SERPL-SCNC: 100 MMOL/L (ref 94–109)
CO2 SERPL-SCNC: 29 MMOL/L (ref 20–32)
COLOR UR AUTO: YELLOW
CREAT SERPL-MCNC: 0.59 MG/DL (ref 0.52–1.04)
DIFFERENTIAL METHOD BLD: ABNORMAL
EOSINOPHIL # BLD AUTO: 0 10E9/L (ref 0–0.7)
EOSINOPHIL NFR BLD AUTO: 1.2 %
ERYTHROCYTE [DISTWIDTH] IN BLOOD BY AUTOMATED COUNT: 23.5 % (ref 10–15)
GFR SERPL CREATININE-BSD FRML MDRD: ABNORMAL ML/MIN/1.7M2
GLUCOSE SERPL-MCNC: 92 MG/DL (ref 70–99)
GLUCOSE UR STRIP-MCNC: NEGATIVE MG/DL
HCT VFR BLD AUTO: 23 % (ref 35–47)
HGB BLD-MCNC: 7.5 G/DL (ref 11.7–15.7)
HGB UR QL STRIP: NEGATIVE
HIV 1+2 AB+HIV1 P24 AG SERPL QL IA: NORMAL
HYALINE CASTS #/AREA URNS LPF: 1 /LPF (ref 0–2)
IMM GRANULOCYTES # BLD: 0 10E9/L (ref 0–0.4)
IMM GRANULOCYTES NFR BLD: 0 %
INR PPP: 1.43 (ref 0.86–1.14)
KETONES UR STRIP-MCNC: NEGATIVE MG/DL
LACTATE BLD-SCNC: 3.1 MMOL/L (ref 0.7–2.1)
LACTATE BLD-SCNC: 3.5 MMOL/L (ref 0.7–2.1)
LEUKOCYTE ESTERASE UR QL STRIP: NEGATIVE
LYMPHOCYTES # BLD AUTO: 0.5 10E9/L (ref 0.8–5.3)
LYMPHOCYTES NFR BLD AUTO: 30.5 %
M TB TUBERC IFN-G BLD QL: NEGATIVE
M TB TUBERC IFN-G/MITOGEN IGNF BLD: 0.01 IU/ML
MCH RBC QN AUTO: 30.7 PG (ref 26.5–33)
MCHC RBC AUTO-ENTMCNC: 32.6 G/DL (ref 31.5–36.5)
MCV RBC AUTO: 94 FL (ref 78–100)
MICRO REPORT STATUS: NORMAL
MONOCYTES # BLD AUTO: 0.2 10E9/L (ref 0–1.3)
MONOCYTES NFR BLD AUTO: 11.6 %
NEUTROPHILS # BLD AUTO: 0.9 10E9/L (ref 1.6–8.3)
NEUTROPHILS NFR BLD AUTO: 55.5 %
NITRATE UR QL: NEGATIVE
NRBC # BLD AUTO: 0 10*3/UL
NRBC BLD AUTO-RTO: 0 /100
PARASITE SPEC INSPECT: NORMAL
PH UR STRIP: 5.5 PH (ref 5–7)
PLATELET # BLD AUTO: 43 10E9/L (ref 150–450)
POTASSIUM SERPL-SCNC: 3.8 MMOL/L (ref 3.4–5.3)
RBC # BLD AUTO: 2.44 10E12/L (ref 3.8–5.2)
RBC #/AREA URNS AUTO: <1 /HPF (ref 0–2)
SODIUM SERPL-SCNC: 136 MMOL/L (ref 133–144)
SP GR UR STRIP: 1.01 (ref 1–1.03)
SPECIMEN SOURCE: NORMAL
SQUAMOUS #/AREA URNS AUTO: <1 /HPF (ref 0–1)
URN SPEC COLLECT METH UR: ABNORMAL
UROBILINOGEN UR STRIP-MCNC: NORMAL MG/DL (ref 0–2)
WBC # BLD AUTO: 1.6 10E9/L (ref 4–11)
WBC #/AREA URNS AUTO: 1 /HPF (ref 0–2)

## 2017-07-10 PROCEDURE — P9041 ALBUMIN (HUMAN),5%, 50ML: HCPCS | Performed by: STUDENT IN AN ORGANIZED HEALTH CARE EDUCATION/TRAINING PROGRAM

## 2017-07-10 PROCEDURE — 97116 GAIT TRAINING THERAPY: CPT | Mod: GP | Performed by: REHABILITATION PRACTITIONER

## 2017-07-10 PROCEDURE — 99232 SBSQ HOSP IP/OBS MODERATE 35: CPT | Mod: GC | Performed by: INTERNAL MEDICINE

## 2017-07-10 PROCEDURE — 85730 THROMBOPLASTIN TIME PARTIAL: CPT | Performed by: STUDENT IN AN ORGANIZED HEALTH CARE EDUCATION/TRAINING PROGRAM

## 2017-07-10 PROCEDURE — 25000128 H RX IP 250 OP 636: Performed by: STUDENT IN AN ORGANIZED HEALTH CARE EDUCATION/TRAINING PROGRAM

## 2017-07-10 PROCEDURE — 36415 COLL VENOUS BLD VENIPUNCTURE: CPT | Performed by: STUDENT IN AN ORGANIZED HEALTH CARE EDUCATION/TRAINING PROGRAM

## 2017-07-10 PROCEDURE — 83605 ASSAY OF LACTIC ACID: CPT | Performed by: INTERNAL MEDICINE

## 2017-07-10 PROCEDURE — 81001 URINALYSIS AUTO W/SCOPE: CPT | Performed by: STUDENT IN AN ORGANIZED HEALTH CARE EDUCATION/TRAINING PROGRAM

## 2017-07-10 PROCEDURE — 25000132 ZZH RX MED GY IP 250 OP 250 PS 637

## 2017-07-10 PROCEDURE — 97530 THERAPEUTIC ACTIVITIES: CPT | Mod: GP | Performed by: REHABILITATION PRACTITIONER

## 2017-07-10 PROCEDURE — 40000133 ZZH STATISTIC OT WARD VISIT

## 2017-07-10 PROCEDURE — 85610 PROTHROMBIN TIME: CPT | Performed by: STUDENT IN AN ORGANIZED HEALTH CARE EDUCATION/TRAINING PROGRAM

## 2017-07-10 PROCEDURE — 40000264 ECHO COMPLETE WITH OPTISON

## 2017-07-10 PROCEDURE — 40000193 ZZH STATISTIC PT WARD VISIT: Performed by: REHABILITATION PRACTITIONER

## 2017-07-10 PROCEDURE — 97535 SELF CARE MNGMENT TRAINING: CPT | Mod: GO

## 2017-07-10 PROCEDURE — 87040 BLOOD CULTURE FOR BACTERIA: CPT | Performed by: STUDENT IN AN ORGANIZED HEALTH CARE EDUCATION/TRAINING PROGRAM

## 2017-07-10 PROCEDURE — 25000132 ZZH RX MED GY IP 250 OP 250 PS 637: Performed by: INTERNAL MEDICINE

## 2017-07-10 PROCEDURE — 25500064 ZZH RX 255 OP 636: Performed by: INTERNAL MEDICINE

## 2017-07-10 PROCEDURE — 25000132 ZZH RX MED GY IP 250 OP 250 PS 637: Performed by: STUDENT IN AN ORGANIZED HEALTH CARE EDUCATION/TRAINING PROGRAM

## 2017-07-10 PROCEDURE — 83605 ASSAY OF LACTIC ACID: CPT | Performed by: STUDENT IN AN ORGANIZED HEALTH CARE EDUCATION/TRAINING PROGRAM

## 2017-07-10 PROCEDURE — 36415 COLL VENOUS BLD VENIPUNCTURE: CPT | Performed by: INTERNAL MEDICINE

## 2017-07-10 PROCEDURE — 93306 TTE W/DOPPLER COMPLETE: CPT | Mod: 26 | Performed by: INTERNAL MEDICINE

## 2017-07-10 PROCEDURE — 85025 COMPLETE CBC W/AUTO DIFF WBC: CPT | Performed by: STUDENT IN AN ORGANIZED HEALTH CARE EDUCATION/TRAINING PROGRAM

## 2017-07-10 PROCEDURE — 25000128 H RX IP 250 OP 636: Performed by: INTERNAL MEDICINE

## 2017-07-10 PROCEDURE — 21400006 ZZH R&B CCU INTERMEDIATE UMMC

## 2017-07-10 PROCEDURE — 25000131 ZZH RX MED GY IP 250 OP 636 PS 637: Performed by: STUDENT IN AN ORGANIZED HEALTH CARE EDUCATION/TRAINING PROGRAM

## 2017-07-10 PROCEDURE — 25000125 ZZHC RX 250: Performed by: STUDENT IN AN ORGANIZED HEALTH CARE EDUCATION/TRAINING PROGRAM

## 2017-07-10 PROCEDURE — 80048 BASIC METABOLIC PNL TOTAL CA: CPT | Performed by: STUDENT IN AN ORGANIZED HEALTH CARE EDUCATION/TRAINING PROGRAM

## 2017-07-10 PROCEDURE — 25000132 ZZH RX MED GY IP 250 OP 250 PS 637: Performed by: NURSE PRACTITIONER

## 2017-07-10 RX ORDER — ALBUMIN, HUMAN INJ 5% 5 %
25 SOLUTION INTRAVENOUS ONCE
Status: COMPLETED | OUTPATIENT
Start: 2017-07-10 | End: 2017-07-10

## 2017-07-10 RX ADMIN — ACETAMINOPHEN 650 MG: 325 TABLET, FILM COATED ORAL at 23:37

## 2017-07-10 RX ADMIN — FUROSEMIDE 40 MG: 10 INJECTION, SOLUTION INTRAVENOUS at 08:57

## 2017-07-10 RX ADMIN — GABAPENTIN 200 MG: 100 CAPSULE ORAL at 20:27

## 2017-07-10 RX ADMIN — GABAPENTIN 200 MG: 100 CAPSULE ORAL at 13:43

## 2017-07-10 RX ADMIN — FOLIC ACID 1 MG: 1 TABLET ORAL at 08:55

## 2017-07-10 RX ADMIN — MICONAZOLE NITRATE: 2 POWDER TOPICAL at 20:27

## 2017-07-10 RX ADMIN — MIDODRINE HYDROCHLORIDE 10 MG: 5 TABLET ORAL at 17:09

## 2017-07-10 RX ADMIN — ACETAMINOPHEN 650 MG: 325 TABLET, FILM COATED ORAL at 17:08

## 2017-07-10 RX ADMIN — PANTOPRAZOLE SODIUM 40 MG: 40 TABLET, DELAYED RELEASE ORAL at 06:06

## 2017-07-10 RX ADMIN — ATENOLOL 75 MG: 50 TABLET ORAL at 08:53

## 2017-07-10 RX ADMIN — HUMAN ALBUMIN MICROSPHERES AND PERFLUTREN 6 ML: 10; .22 INJECTION, SOLUTION INTRAVENOUS at 11:15

## 2017-07-10 RX ADMIN — ALBUMIN (HUMAN) 25 G: 12.5 SOLUTION INTRAVENOUS at 02:40

## 2017-07-10 RX ADMIN — DIGOXIN 125 MCG: 125 TABLET ORAL at 08:54

## 2017-07-10 RX ADMIN — Medication 30 MG: at 08:54

## 2017-07-10 RX ADMIN — ACETAMINOPHEN 650 MG: 325 TABLET, FILM COATED ORAL at 09:08

## 2017-07-10 RX ADMIN — MIDODRINE HYDROCHLORIDE 10 MG: 5 TABLET ORAL at 08:55

## 2017-07-10 RX ADMIN — CALCIUM CARBONATE 600 MG (1,500 MG)-VITAMIN D3 400 UNIT TABLET 2 TABLET: at 08:55

## 2017-07-10 RX ADMIN — MULTIPLE VITAMINS W/ MINERALS TAB 1 TABLET: TAB at 08:54

## 2017-07-10 RX ADMIN — POTASSIUM CHLORIDE 40 MEQ: 750 TABLET, EXTENDED RELEASE ORAL at 08:56

## 2017-07-10 RX ADMIN — POTASSIUM CHLORIDE 20 MEQ: 750 TABLET, EXTENDED RELEASE ORAL at 06:25

## 2017-07-10 RX ADMIN — GABAPENTIN 200 MG: 100 CAPSULE ORAL at 08:54

## 2017-07-10 RX ADMIN — MICONAZOLE NITRATE: 2 POWDER TOPICAL at 08:00

## 2017-07-10 RX ADMIN — PREDNISONE 7.5 MG: 5 TABLET ORAL at 08:55

## 2017-07-10 NOTE — PROGRESS NOTES
CLINICAL NUTRITION SERVICES - REASSESSMENT NOTE     Nutrition Prescription    RECOMMENDATIONS FOR MDs/PROVIDERS TO ORDER:  None at this time.    Future/Additional Recommendations:  1. Continue regular diet to help encourage oral intake.  Encourage high protein and high kcal options, including oral supplements, as tolerated.    2. Vitamin C, zinc, and vitamin A supplementation may be resumed for an additional ten-day course if wound is not healing, as per provider discretion..   3. Continue thera-vit-M, as ordered, to ensure micronutrient needs are met. Continue calcium/vitamin D as ordered due to prednisone.  4. Consider discontinuation of folic acid supplementation, if able. Folate lab was 4./7 on 7/8/17.   5. Assess ability to discontinue coenzyme Q10, if able, to minimize pills.     EVALUATION OF THE PROGRESS TOWARD GOALS   Diet: Regular diet order since 7/7. Has a prn supplement order as well as 1% milk and 1 pkt Beneprotein at 10:00.  Intake: Per nursing flowsheets, good diet tolerance. Flowsheets indicate pt consuming 100% of meals with a good appetite 7/3, % of meals with a good appetite 7/4, and 100% of meals with a good appetite 7/8-7/9. Pt was busy with RN when attempting to see. Factors affecting nutrition intake include decreased appetite, LOS, and previously with nausea.    Kcal counts:  7/3    955 kcals and 64 g protein (three meal/s and no supplement/s recorded)  7/4   1101 kcals and 54 g protein (three meal/s and two Beneprotein supplement/s recorded)  7/5   1003 kcals and 45 g protein (meal/s and one packet Beneprotein supplement/s recorded)  *  Pt consumed a three-day average of 1020 kcals and 54 g protein daily. Not quite meeting estimated needs of 0209-1632 kcals/day (25 - 30 kcals/kg) and  61-77 grams protein/day (1.2 - 1.5 grams of pro/kg), but oral intake has improved.      Nutrition Support:   - Feeding Tube (FT) access:  NDT + bridle placed via Cortrak 5/31, removed on 7/5 due to  improved oral intake  - TF regimen:  Previous TF regimen was Peptamen 1.5 at goal 40 ml/hr (960 ml/day) to provide 1440 kcals (28 kcal/kg/day), 65 g PRO (1.3 g/kg/day), 739 ml free H2O, 54 g Fat (70% from MCTs), 180 g CHO and no Fiber daily.     NEW FINDINGS   WOC nurse 7/5: Large area of epidermal loss with potential full thickness skin loss at area of necrosis 2/2 extravastion.  Measurements unchanged but slough is loosening.  Decreased erythema and pain  Pt would like to try more aggressive autolytic debridement. She is aware that this may increase pain      MALNUTRITION  % Intake: Not meeting this criteria.     % Weight Loss: Difficult to assess with changes in fluid status, diuresis  Subcutaneous Fat Loss: None observed, but difficult to assess with fluid status as per previous nutrition note. Pt busy during attempts to see today (7/3).   Muscle Loss: None observed, but difficult to assess with fluid status. Possible losses as per previous nutrition note. Pt busy during attempts to see today (7/3).   Fluid Accumulation/Edema: Moderate  Malnutrition Diagnosis: Unable to determine due to pt busy and difficult to assess with fluid status changes.    Previous Goals   Total average nutrition intake to meet 2746-0430 kcals/day (25 - 30 kcals/kg) and 61-77 grams protein/day (1.2 - 1.5 grams of pro/kg).  Evaluation: Not met.     Previous Nutrition Diagnosis  Inadequate oral intake related to nausea and medications as evidenced by pt consumed a two-day average (6/27-6/28) of 300 kcals and 17 g protein daily which does not meet estimated needs of 1664-8089 kcals/day (25 - 30 kcals/kg) and 61-77 grams protein/day (1.2 - 1.5 grams of pro/kg).  Evaluation: Unresolved, but improving.     CURRENT NUTRITION DIAGNOSIS   Inadequate oral intake related to decreased appetite, LOS, and previously with nausea as evidenced by pt consumed a three-day kcal count average of 1020 kcals and 54 g protein daily and not quite meeting  estimated needs of 5140-4138 kcals/day (25 - 30 kcals/kg) and  61-77 grams protein/day (1.2 - 1.5 grams of pro/kg).    INTERVENTIONS  Implementation  None additionally at this time.    Goals  Patient to consume % of nutritionally adequate meal trays TID, or the equivalent with supplements/snacks.    Monitoring/Evaluation  Progress toward goals will be monitored and evaluated per protocol.     Nutrition will continue to follow.    Inés Brown, MS, RD, LD, [facility]   6C Pgr:  [phone]

## 2017-07-10 NOTE — PLAN OF CARE
Problem: Goal Outcome Summary  Goal: Goal Outcome Summary  1. Pt will be free of fever/chills   PT - per plan established by the Physical Therapist, according to functional mobility the  discharge recommendation is rehab for cont skilled PT to progress functional mobility. Pt very willing to work with PT today. Pt needing CGA for all sit to stand from recliner and W/C pt amb 80'x 4 and 100'x 2 with seated and standing rest between due to fatigue. And slight SOB. Pt 02 SATS 96% thoughout PT session. Pt needing min A for Vinh LE to return to bed.

## 2017-07-10 NOTE — PLAN OF CARE
Problem: Goal Outcome Summary  Goal: Goal Outcome Summary  1. Pt will be free of fever/chills   Outcome: No Change  D/I/A:  Pt here from acute rehab with fever of unknown source, pancytopenia and concern for LUE infection from IV infiltration.  Hx VSD repair, RA, with recent afib dx complicated by OOH arrest 5/31.  Continues with fevers, afib and lactic acidosis.  See MD notification note regarding lactic acid level overnight.  Pt denies pain, slept between cares.  Tolerated straight cath for urine sample.  Up with A1.   P:  Pt to have BM bx this week to r/o MDS.  Labs pending this am.  Update md's with changes/concerns.   Cross cover aware of recheck lactic of 3.1 and VS this am.

## 2017-07-10 NOTE — PROGRESS NOTES
"HEMATOLOGY BRIEF UPDATE    S: No adverse events O/N. Continues to have cyclical fevers, curbed with scheduled Tylenol. Pt does have new report of night sweating; states woke up \"completely drenched\" with sweat. Good PO and fluid intake. No dyspnea. No changes to BM. Good UOP on diuresis. Pancytopenia persistent, but stable. Lactate is trending upward at 3.5; primary team aware.    O:  /68 (BP Location: Right arm)  Pulse 123  Temp 99.7  F (37.6  C) (Axillary)  Resp 18  Ht 1.48 m (4' 10.25\")  Wt 75.9 kg (167 lb 6.4 oz)  SpO2 98%  BMI 34.68 kg/m2    Physical Exam:  Constitutional: Pleasant female resting comfortably in bed in NAD. Alert and interactive.   HEENT: PERRL, EOMI, anicteric sclera. Oral mucosa pink and moist with no lesions or thrush.  Hematologic / Lymphatic: No overt bleeding. No cervical or clavicular adenopathy. 1-2 cm non-tender mass along right IJ site.  Respiratory: Non-labored breathing, good air exchange, lungs clear to auscultation bilaterally. No cough or wheeze noted.   Cardiovascular: Tachycardic. Normal rhythm. Normal S1 and S2. No murmur or rub.   GI: Normoactive bowel sounds. Abdomen soft, non-distended, and non-tender. No palpable masses or organomegaly. No splenomegaly.  Genitourinary: Deferred.   Skin: Warm and dry. No concerning lesions or rash on exposed surfaces. No petechiae. PICC line in place in right basilic without oozing.  Musculoskeletal: Extremities grossly normal, non-tender, with 1+ pitting edema in bilateral lower extremities. Strong peripheral pulses. Good strength and ROM in bed.   Neurologic: A&O x 3, CNs 2-12 grossly intact, speech normal, sensation to light touch grossly WNL. Grossly non-focal.  Neuropsychiatric: Mentation and affect appear normal/appropriate.      A/P:   Bree Michel is a 57 yo female with PMHx of VSD, RA (on chronic methotrexate), and a recently diagnosed Afib resulting in cardiac arrest on 5/31, presenting with pancytopenia and fever of " "unknown origin.      She seems to have some baseline mild pancytopenia but her counts clearly dropped immediately following her cardiac arrest. In the setting of long-term methotrexate use it is possible that cardiac arrest likely caused bone-marrow infraction and some amount of failure. A smouldering primary bone marrow malignancy (e.g. MDS, leukemia, etc.) or bone marrow failure syndromes like aplastic anemia is also likely. As her infectious work up comes back neg, we should definitely rule out a primary bone marrow disease and before consideration for EPO or TPO. Her calculated \"Bone Marrow Score\" is 10 (George et al, Medicine, 2014), correlating to recommendation of BMBX (a BM score > 6).       RECOMMENDATIONS:  - Plan for BMBX on Wednesday (7/12) at 1030.  -Plan to send BM flow-cytometry and cytogentics for prognosis and guiding treatment  -Goal Hb >7 gm  -Goal Plt count 10K if no bleeding or 50K if active bleeding  -Recommend D/C apixiban for Afib anticoagulation given severe thrombocytopenia that will increase the risk for bleeding  -Monitor for bleeding    Agree with above documentation from Luc Topete, MS4      Fox Salinas MD  Hematology Oncology fellow  [phone]      "

## 2017-07-10 NOTE — PLAN OF CARE
Problem: Goal Outcome Summary  Goal: Goal Outcome Summary  1. Pt will be free of fever/chills   OT/EDEMA 6C: Platelets slightly increased today and appropriate to trial Jobst compression stockings to ain in pedal edema and fluid management up to groin. Patient fit and issued with thigh high/20-30mmHg/medium sized Jobst compression stockings to wear during daytime hours only (removing at night time for skin integrity purposes). *See patient care order for details*. Patient to discontinue use of stockings if increased pain, numbness/tingling or soiling occurs.

## 2017-07-10 NOTE — PROVIDER NOTIFICATION
D/I/A: Pt flagged sepsis protocol for HR >100 and WBC 2.6.  Lactic acid back at 3.5.  Roxana Mott updated and assessed pt.  Order for UA/UC, BCx2, albumin and recheck lactic level with am labs.  VS per flow sheet.  Pt aware of plan.    P: Continue to monitor pt status and update md's with changes/concerns.  Cross cover to update day team.

## 2017-07-10 NOTE — PLAN OF CARE
Problem: Goal Outcome Summary  Goal: Goal Outcome Summary  1. Pt will be free of fever/chills   OT/6C:  Pt with improved standing tolerance this date in comparison to previous session.  Tolerates ~7 minutes of standing ADLs with set up assist, requires intermittent standing rest breaks for fatigue management and PLB - able to self initiate.  Max A for LB dressing due to not having AE to use, max A for posterior mary cares (SBA+FWW while standing for anterior).  Pt continues to benefit from OT to improve strength, standing activity tolerance, and fatigue management.      REC:  ARU when medically ready.

## 2017-07-10 NOTE — PROGRESS NOTES
Cardiology Daily Note   Date of Service: 7/9/2017  Patient: Amelia Michel  MRN: [id number]  Admission Date: 6/19/2017  Hospital Day # 21    Assessment & Plan:   Amelia Michel is a 56 year old female with PMHx of VSD s/p repair (1969), RA, recently diagnosed atrial fibrillation that was complicated with an OOH cardiac arrest (felt 2/2 long pause with degeneration to VFib while converting Afib to SR with multiple AV prieto agents) who was readmitted from ARU on 6/19 with fever and concern for worsening LUE infection. She is now transferred back to cardiology service as primary on 6/26 for further workup and evaluation of recurrent lactic acidosis, persistent afib and softer blood pressures with tachypnea.     # Volume overload/anasarca:  # HFrEF (EF 50-55%):  # Hypotension:  Patient initially up 35# at admission; diuresis initially challenging, secondary to third spacing of fluid (albumin 1.9), but briskly responded with addition of midodrine and ongoing albumin resuscitation. Right heart cath 6/26 with mildly elevated right and left sided filling presures  - Gentle diuresis with furosemide, 60 mg 7/9/17  - Tapering midodrine, currently 10 mg BID (from 10 mg TID)    # Atrial fibrillation/flutter with RVR:  RVR in setting of volume overload above.  - Continue dig 125mcg  - Atenolol 75 mg daily   - Not anticoagulated due to thrombocytopenia    # Out of hospital arrest:  Pt will need to wear LifeVest until medically appropriate and cleared from infection/wound standpoint for ICD implantation likely 4-6 weeks after discharge per EP (pending hospital course for LUE wound)    # Fever of unknown origin  Previously attributes to LUE wound, though patient has continued to have fevers despite broad spectrum antibiotics. ID consulted and feels that unlikely secondary to infection. Discontinued antibiotics with close monitoring. Patient without DVT, no indolent nidus of infection noted on MRI spine, LUE wound  continues to improve without antibiotics.   - Patient neutropenic, not to degree to warrant antibiotics for fever at present  - TTE for consideration of culture negative endocarditis      # Acute on Chronic Thrombocytopenia:   2/2 hypoproliferative bone marrow. Plts noted to decrease from 104 to as low as 15 during recent hospitalization requiring transfusion w/ chronically low in the 's as outpatient. Possible bactrim is offending agent for exacerbation, and discontinued this medication in the setting of worsening cellulitis.   -- If this is chronic ITP, no need for treatment unless plts drift < 10s if not bleeding  -- Continue to hold RA meds for now  -- D/C apixiban (last dose 7/8/17)    # Acute on chronic anemia: Stable  No evidence of overt bleeding (stool light brown). T Bili normal.  - Daily CBC    # RA:   History of seropositive rheumatoid arthritis (anti-CCP positive) since late 1980;s w/ multiple MTP osteotomies, partial right wrist fusion, longstanding therapy consisting of MTX, prednisone and HCQ  -- Continue with Prednisone taper. Now on 10 mg daily, taper to 7.5 mg on 7/1 then to 5 mg daily after 2 weeks if continues to have low disease activity  -- Continue to hold HCQ and MTX until outpatient follow-up  -- Follow-up with [facility] clinic Dr. Conor Cunha in 4-6 weeks after discharge      # Severe Critical Illness Myopathy:   Significant deconditioning w/ median neuropathies most likely localized to the wrists and ulnar neuropathies most likely localized to the elbows secondary to RA.  -- Ongoing extensive PT/OT/SLP      # Malnutrition:   -- Discontinued tube feeds due to improved PO intake  -- Remove NG    Consulting Services: Nutrition, ID, heart failure service    CODE: Full Code  DVT Ppx: Apixaban resumed 6/28  Diet/Fluids: As above.  Disposition: Pending improvement in above.    Pt's care was discussed with bedside RN and patient during Care Team Rounds.    Patient was discussed  "and evaluated with Dr. Mclain.    Kelly Luis  Internal Medicine PGY2  [phone]    ___________________________________________________________________    Subjective & Interval History:     Last 24 hr care team notes reviewed. Patient with episode of fever this morning. No joint pain. No rashes. Breathing is improving, off of oxygen overnight. Swelling is improving.     4 point ROS otherwise negative.    Medications: Reviewed in EPIC. List below for reference    Physical Exam:    Blood pressure 110/68, pulse 123, temperature 99.7  F (37.6  C), temperature source Axillary, resp. rate 18, height 1.48 m (4' 10.25\"), weight 75.9 kg (167 lb 6.4 oz), SpO2 98 %, not currently breastfeeding.    CONSTITUTIONAL:  Ms. Michel is sitting up in bed in no apparent distress.  HEENT: MMM. EOMI. +JVD to midway to angle of the jaw.  LUNGS: Normal respiratory effort without increased work of breathing. No wheezes appreciated. Mild crackles in bilateral bases.   CARDIOVASCULAR: Irregularly irregular, tachycardic w/ no M/R/G.   ABDOMEN: Soft, nondistended, NT, BS +ve.   MUSCULOSKELETAL: Moves all four extremities without difficulty. Diffuse anasarca.  NEURO: A&Ox3, CN II-XII grossly intact, non-focal.     Labs & Studies of Note: Reviewed in Epic  CMP    Recent Labs  Lab 07/10/17  0546 07/09/17  1944 07/09/17  0758 07/08/17  0733 07/07/17  2349  07/04/17  1940 07/04/17  0550 07/03/17 1956    135 136 134 134  < > 136 136 136   POTASSIUM 3.8 3.7 3.7 3.8 3.7  < > 4.0 3.8 3.9   CHLORIDE 100 99 100 98 97  < > 100 101 102   CO2 29 27 28 30 29  < > 28 26 28   ANIONGAP 7 10 8 6 8  < > 8 9 6   GLC 92 188* 98 101* 107*  < > 191* 100* 149*   BUN 24 24 26 28 26  < > 26 29 34*   CR 0.59 0.67 0.62 0.61 0.52  < > 0.58 0.54 0.55   GFRESTIMATED >90Non  GFR Calc >90Non  GFR Calc >90Non  GFR Calc >90Non  GFR Calc >90Non  GFR Calc  < > >90Non  GFR Calc " >90Non  GFR Calc >90Non  GFR Calc   GFRESTBLACK >90African American GFR Calc >90African American GFR Calc >90African American GFR Calc >90African American GFR Calc >90African American GFR Calc  < > >90African American GFR Calc >90African American GFR Calc >90African American GFR Calc   VIKTORIYA 8.1* 7.9* 8.3* 8.2* 8.3*  < > 7.7* 8.1* 8.0*   MAG  --   --  2.0 2.0 1.6  --   --  2.4* 1.7   PHOS  --   --  2.7  --   --   --  2.9  --  2.4*   < > = values in this interval not displayed.    CBC    Recent Labs  Lab 07/10/17  0546 07/08/17  1308 07/07/17  0715 07/06/17  1318   WBC 1.6* 2.6* 1.4* 1.7*   RBC 2.44* 2.86* 2.41* 2.74*   HGB 7.5* 8.7* 7.4* 8.4*   HCT 23.0* 26.5* 22.5* 25.6*   PLT 43* 45* 27* 34*       INR    Recent Labs  Lab 07/10/17  0546 07/08/17  0914   INR 1.43* 1.68*     Medication List for Reference (delete if desired)  Current Facility-Administered Medications   Medication     midodrine (PROAMATINE) tablet 10 mg     acetaminophen (TYLENOL) tablet 650 mg     potassium chloride SA (K-DUR/KLOR-CON M) CR tablet 40 mEq     atenolol (TENORMIN) tablet 75 mg     furosemide (LASIX) injection 40 mg     miconazole (MICATIN; MICRO GUARD) 2 % powder     predniSONE (DELTASONE) tablet 7.5 mg     lidocaine (LMX4) kit     calcium carbonate (TUMS) chewable tablet 500 mg     pantoprazole (PROTONIX) EC tablet 40 mg     digoxin (LANOXIN) tablet 125 mcg     ondansetron (ZOFRAN) tablet 4 mg     prochlorperazine (COMPAZINE) injection 5-10 mg     ondansetron (ZOFRAN) injection 4 mg     simethicone (MYLICON) chewable tablet 80 mg     sodium chloride (PF) 0.9% PF flush 10-20 mL     sodium chloride (PF) 0.9% PF flush 10 mL     heparin lock flush 10 UNIT/ML injection 5-10 mL     heparin lock flush 10 UNIT/ML injection 5-10 mL     dextrose 10 % 1,000 mL infusion     folic acid (FOLVITE) tablet 1 mg     gabapentin (NEURONTIN) capsule 200 mg     melatonin tablet 3 mg     morphine 0.1% in solosite topical gel 2 g      multivitamin, therapeutic with minerals (THERA-VIT-M) tablet 1 tablet     triamcinolone (KENALOG) 0.1 % ointment     lidocaine 1 % 1 mL     lidocaine (LMX4) kit     sodium chloride (PF) 0.9% PF flush 3 mL     sodium chloride (PF) 0.9% PF flush 3 mL     medication instruction     acetaminophen (TYLENOL) Suppository 650 mg     Patient is already receiving anticoagulation with heparin, enoxaparin (LOVENOX), warfarin (COUMADIN)  or other anticoagulant medication     glucose 40 % gel 15-30 g    Or     dextrose 50 % injection 25-50 mL    Or     glucagon injection 1 mg     naloxone (NARCAN) injection 0.1-0.4 mg     calcium-vitamin D (CALTRATE) 600-400 MG-UNIT per tablet 2 tablet     co-enzyme Q-10 capsule 30 mg     morphine 0.1% in solosite topical gel     potassium chloride SA (K-DUR/KLOR-CON M) CR tablet 20-40 mEq     potassium chloride (KLOR-CON) Packet 20-40 mEq     potassium chloride 10 mEq in 100 mL intermittent infusion     potassium chloride 10 mEq in 100 mL intermittent infusion with 10 mg lidocaine     potassium chloride 20 mEq in 50 mL intermittent infusion     magnesium sulfate 2 g in NS intermittent infusion (PharMEDium or FV Cmpd)     magnesium sulfate 4 g in 100 mL sterile water (premade)     sodium phosphate 10 mmol in D5W intermittent infusion     sodium phosphate 15 mmol in D5W intermittent infusion     sodium phosphate 20 mmol in D5W intermittent infusion     sodium phosphate 25 mmol in D5W intermittent infusion     melatonin tablet 1 mg     traMADol (ULTRAM) half-tab 25 mg

## 2017-07-10 NOTE — PLAN OF CARE
Problem: Sepsis (Adult)  Goal: Signs and Symptoms of Listed Potential Problems Will be Absent or Manageable (Sepsis)  Signs and symptoms of listed potential problems will be absent or manageable by discharge/transition of care (reference Sepsis (Adult) CPG).   Outcome: Improving  D/I: Monitor shows A-fib/flutter 120s. Sepsis protocol triggered on evening shift. Low grade temps (.0). Decrease with tylenol. AM lactic acid 3.1. Providers aware and have no concerns. Denies pain. C/O shortness of breath with activity. Up to chair with minimal assist. Ambulated in halls with therapy. Left arm wound changed per plan of care. Patient states no change in appearance. See flowsheets for assessments and additional data.  A: No overt appearance of sepsis. On/off low-grade fever. Continue wound drainage.   P: Continue wound cares per plan of care. Monitor temps and symptoms. Encourage increased activity and participation in cares. Continue current cares and notify providers with questions or concerns.     07/10/17 1449   Sepsis   Problems Assessed (Sepsis) all   Problems Present (Sepsis) none

## 2017-07-11 ENCOUNTER — APPOINTMENT (OUTPATIENT)
Dept: OCCUPATIONAL THERAPY | Facility: CLINIC | Age: 57
DRG: 871 | End: 2017-07-11
Attending: INTERNAL MEDICINE
Payer: COMMERCIAL

## 2017-07-11 ENCOUNTER — APPOINTMENT (OUTPATIENT)
Dept: PHYSICAL THERAPY | Facility: CLINIC | Age: 57
DRG: 871 | End: 2017-07-11
Attending: INTERNAL MEDICINE
Payer: COMMERCIAL

## 2017-07-11 LAB
ANION GAP SERPL CALCULATED.3IONS-SCNC: 6 MMOL/L (ref 3–14)
BUN SERPL-MCNC: 24 MG/DL (ref 7–30)
CALCIUM SERPL-MCNC: 8.4 MG/DL (ref 8.5–10.1)
CHLORIDE SERPL-SCNC: 101 MMOL/L (ref 94–109)
CO2 SERPL-SCNC: 29 MMOL/L (ref 20–32)
CREAT SERPL-MCNC: 0.66 MG/DL (ref 0.52–1.04)
GFR SERPL CREATININE-BSD FRML MDRD: ABNORMAL ML/MIN/1.7M2
GLUCOSE SERPL-MCNC: 94 MG/DL (ref 70–99)
LACTATE BLD-SCNC: 2.6 MMOL/L (ref 0.7–2.1)
MAGNESIUM SERPL-MCNC: 1.7 MG/DL (ref 1.6–2.3)
PHOSPHATE SERPL-MCNC: 3.1 MG/DL (ref 2.5–4.5)
POTASSIUM SERPL-SCNC: 3.8 MMOL/L (ref 3.4–5.3)
SODIUM SERPL-SCNC: 136 MMOL/L (ref 133–144)

## 2017-07-11 PROCEDURE — 40000193 ZZH STATISTIC PT WARD VISIT: Performed by: PHYSICAL THERAPIST

## 2017-07-11 PROCEDURE — 99232 SBSQ HOSP IP/OBS MODERATE 35: CPT | Mod: GC | Performed by: INTERNAL MEDICINE

## 2017-07-11 PROCEDURE — 25000132 ZZH RX MED GY IP 250 OP 250 PS 637: Performed by: INTERNAL MEDICINE

## 2017-07-11 PROCEDURE — 21400006 ZZH R&B CCU INTERMEDIATE UMMC

## 2017-07-11 PROCEDURE — 25000132 ZZH RX MED GY IP 250 OP 250 PS 637

## 2017-07-11 PROCEDURE — 97110 THERAPEUTIC EXERCISES: CPT | Mod: GP | Performed by: PHYSICAL THERAPIST

## 2017-07-11 PROCEDURE — 80048 BASIC METABOLIC PNL TOTAL CA: CPT | Performed by: STUDENT IN AN ORGANIZED HEALTH CARE EDUCATION/TRAINING PROGRAM

## 2017-07-11 PROCEDURE — 97116 GAIT TRAINING THERAPY: CPT | Mod: GP | Performed by: PHYSICAL THERAPIST

## 2017-07-11 PROCEDURE — 36415 COLL VENOUS BLD VENIPUNCTURE: CPT | Performed by: STUDENT IN AN ORGANIZED HEALTH CARE EDUCATION/TRAINING PROGRAM

## 2017-07-11 PROCEDURE — 36415 COLL VENOUS BLD VENIPUNCTURE: CPT | Performed by: INTERNAL MEDICINE

## 2017-07-11 PROCEDURE — 25000125 ZZHC RX 250: Performed by: STUDENT IN AN ORGANIZED HEALTH CARE EDUCATION/TRAINING PROGRAM

## 2017-07-11 PROCEDURE — 25000128 H RX IP 250 OP 636: Performed by: INTERNAL MEDICINE

## 2017-07-11 PROCEDURE — 97530 THERAPEUTIC ACTIVITIES: CPT | Mod: GP | Performed by: PHYSICAL THERAPIST

## 2017-07-11 PROCEDURE — 83735 ASSAY OF MAGNESIUM: CPT | Performed by: STUDENT IN AN ORGANIZED HEALTH CARE EDUCATION/TRAINING PROGRAM

## 2017-07-11 PROCEDURE — 25000131 ZZH RX MED GY IP 250 OP 636 PS 637: Performed by: STUDENT IN AN ORGANIZED HEALTH CARE EDUCATION/TRAINING PROGRAM

## 2017-07-11 PROCEDURE — 97535 SELF CARE MNGMENT TRAINING: CPT | Mod: GO

## 2017-07-11 PROCEDURE — 25000125 ZZHC RX 250: Performed by: INTERNAL MEDICINE

## 2017-07-11 PROCEDURE — 40000133 ZZH STATISTIC OT WARD VISIT

## 2017-07-11 PROCEDURE — 83605 ASSAY OF LACTIC ACID: CPT | Performed by: INTERNAL MEDICINE

## 2017-07-11 PROCEDURE — 25000132 ZZH RX MED GY IP 250 OP 250 PS 637: Performed by: NURSE PRACTITIONER

## 2017-07-11 PROCEDURE — 84100 ASSAY OF PHOSPHORUS: CPT | Performed by: STUDENT IN AN ORGANIZED HEALTH CARE EDUCATION/TRAINING PROGRAM

## 2017-07-11 PROCEDURE — 25000132 ZZH RX MED GY IP 250 OP 250 PS 637: Performed by: STUDENT IN AN ORGANIZED HEALTH CARE EDUCATION/TRAINING PROGRAM

## 2017-07-11 RX ADMIN — DIGOXIN 125 MCG: 125 TABLET ORAL at 07:54

## 2017-07-11 RX ADMIN — Medication 2 G: at 13:42

## 2017-07-11 RX ADMIN — POTASSIUM CHLORIDE 20 MEQ: 750 TABLET, EXTENDED RELEASE ORAL at 13:03

## 2017-07-11 RX ADMIN — PANTOPRAZOLE SODIUM 40 MG: 40 TABLET, DELAYED RELEASE ORAL at 05:21

## 2017-07-11 RX ADMIN — MULTIPLE VITAMINS W/ MINERALS TAB 1 TABLET: TAB at 07:55

## 2017-07-11 RX ADMIN — CALCIUM CARBONATE 600 MG (1,500 MG)-VITAMIN D3 400 UNIT TABLET 2 TABLET: at 07:55

## 2017-07-11 RX ADMIN — PREDNISONE 7.5 MG: 5 TABLET ORAL at 07:53

## 2017-07-11 RX ADMIN — ACETAMINOPHEN 650 MG: 325 TABLET, FILM COATED ORAL at 05:21

## 2017-07-11 RX ADMIN — GABAPENTIN 200 MG: 100 CAPSULE ORAL at 13:10

## 2017-07-11 RX ADMIN — GABAPENTIN 200 MG: 100 CAPSULE ORAL at 07:53

## 2017-07-11 RX ADMIN — MICONAZOLE NITRATE: 2 POWDER TOPICAL at 21:06

## 2017-07-11 RX ADMIN — POTASSIUM CHLORIDE 40 MEQ: 750 TABLET, EXTENDED RELEASE ORAL at 07:55

## 2017-07-11 RX ADMIN — GABAPENTIN 200 MG: 100 CAPSULE ORAL at 22:06

## 2017-07-11 RX ADMIN — ATENOLOL 75 MG: 50 TABLET ORAL at 07:54

## 2017-07-11 RX ADMIN — ACETAMINOPHEN 650 MG: 325 TABLET, FILM COATED ORAL at 22:07

## 2017-07-11 RX ADMIN — MIDODRINE HYDROCHLORIDE 10 MG: 5 TABLET ORAL at 07:54

## 2017-07-11 RX ADMIN — ACETAMINOPHEN 650 MG: 325 TABLET, FILM COATED ORAL at 13:10

## 2017-07-11 RX ADMIN — MIDODRINE HYDROCHLORIDE 10 MG: 5 TABLET ORAL at 18:03

## 2017-07-11 RX ADMIN — FUROSEMIDE 40 MG: 10 INJECTION, SOLUTION INTRAVENOUS at 07:57

## 2017-07-11 NOTE — PLAN OF CARE
Problem: Sepsis (Adult)  Goal: Signs and Symptoms of Listed Potential Problems Will be Absent or Manageable (Sepsis)  Signs and symptoms of listed potential problems will be absent or manageable by discharge/transition of care (reference Sepsis (Adult) CPG).   Outcome: Improving  D/I: Monitor shows A-fib/flutter 120s. No plans for anticoagulation for now. Plt 46 yesterday. Sepsis protocol triggered on evening shift. Lactic acid level 2.4.  No fevers since yesterday. Denies pain. C/O shortness of breath with activity. On RA except for activity. Up to chair with minimal assist. Ambulated in halls with therapy. Left arm wound changed per plan of care. Patient states no change in appearance. See flowsheets for assessments and additional data.  A: No overt appearance of sepsis. Continued wound drainage.   P: Continue wound cares per plan of care. Monitor temps and symptoms. Encourage increased activity and participation in cares. Continue current cares and notify providers with questions or concerns. BM bx planned for tomorrow at 1030.     07/11/17 1530   Sepsis   Problems Assessed (Sepsis) all   Problems Present (Sepsis) situational response;none

## 2017-07-11 NOTE — PLAN OF CARE
Problem: Goal Outcome Summary  Goal: Goal Outcome Summary  1. Pt will be free of fever/chills   Outcome: Improving  VSS. Afib, tachy. Denies pain. Afebrile. Sepsis protocol triggered, Lactic acid 2.6. MD aware. No new orders. Pt would like Sepsis protocol disabled. Will continue to monitor.

## 2017-07-11 NOTE — PLAN OF CARE
Problem: Goal Outcome Summary  Goal: Goal Outcome Summary  1. Pt will be free of fever/chills   OT/6C:  Pt continues to make daily progress with therapies, demonstrates excellent participation, motivation, and family support.  CGA for supine to sit transfer with HOB elevated, effortful, max A for LB dressing, and SBA for STS transfers.  Pt ambulates with FWW and SBA to bathroom and performs toileting task with min A after instruction on use of GBs.  CGA+FWW and GBs for shower transfer after instruction on technique.  SBA and set up for ~3 minutes of standing oral cares.  Pt hemodynamically stable throughout session on RA.       REC:  ARU when medically ready to progress strength, endurance, balance, and fatigue management necessary to resume previous occupations safely and IND.

## 2017-07-11 NOTE — PROGRESS NOTES
"Cardiology progress note    Interval events:  She has been afebrile for the past 24 hours but has been taking scheduled tylenol.  She denies dyspnea, chest pain, lightheadedness, and syncope.  No new symptoms from yesterday.  Bilateral leg edema persists.  No headache, neck pain or stiffness, cough, facial pain, abdominal discomfort, diarrhea, or rashes.    Examination:  BP 95/60 (BP Location: Right arm)  Pulse 123  Temp 98.8  F (37.1  C) (Oral)  Resp 18  Ht 1.48 m (4' 10.25\")  Wt 75.5 kg (166 lb 6.4 oz)  SpO2 94%  BMI 34.48 kg/m2  She is alert & fully oriented.  She has fluent speech.  She has an irregularly irregular tachycardic rhythm with a normal s1 and s2.  Extremities are warm.  Bilateral lower leg edema is improved.  Lungs are clear on ascultation.  Abdomen is soft and is not tender or distended.    Labs, imaging & procedures were reviewed.  Recent Labs   Lab Test  07/10/17   0546  07/08/17   1308  07/07/17   0715  07/06/17   1318  07/05/17   0654  07/04/17   0550   WBC  1.6*  2.6*  1.4*  1.7*  1.4*  1.5*   HGB  7.5*  8.7*  7.4*  8.4*  7.2*  8.7*   PLT  43*  45*  27*  34*  27*  36*     Recent Labs   Lab Test  07/11/17   0824  07/10/17   0546  07/09/17   1944  07/09/17   0758   CR  0.66  0.59  0.67  0.62         Impression:  1. Atrial fibrillation with rapid ventricular rates.  2. Fever up to 102 deg with no infectious, thrombotic, or malignant source identified to date.  She has RA but does not appear to be having a flare.  Infectious disease has signed off, confident that infectious sources are excluded.  3. Heart failure, non-ischemic cardiomyopathy, following VF cardiac arrest, LV EF 50-55%, still volume overloaded on examination and tolerating brisk diuresis without renal dysfunction.  4. OOH VF arrest attributed to AF -> conversion pause -> early afterdepolarization  5. Pancytopenia, persistent despite discontinuing MTX and antibiotics.  6. Malnutrition, improving, no longer requiring enteral " nutrition.    Plan:    We will speak with EP regarding her atrial fibrillation, since attempts at rate and pharmacologic rhythm control with AV prieto blocking agents and amiodarone was inculpated in her cardiac arrest.    She is scheduled to have a bone marrow biopsy tomorrow to investigate for a hematologic malignancy as the cause of her fevers.  If unrevealing we will proceed with a BRE and PET scan to complete the evaluation for occult infections and malignancies.    Diuresis with IV furosemide continues.    I discussed the patient with Dr. Gamboa.    Minesh Cota MD  Cardiovascular disease fellow

## 2017-07-11 NOTE — PLAN OF CARE
Problem: Goal Outcome Summary  Goal: Goal Outcome Summary  1. Pt will be free of fever/chills   Pt alert and oriented.  VSS on room air/2L NC at times for comfort.  A-fib/flutter.  Scheduled tylenol, low grade fever.  Job stockings off, see pt care order.  LUE wound changed per POC on day shift.  Pt ambulating halls w/ therapy.  Likely bone marrow biopsy Wednesday at 1000.  Continue w/ POC and notify team w/ changes/concerns.

## 2017-07-11 NOTE — PLAN OF CARE
Problem: Goal Outcome Summary  Goal: Goal Outcome Summary  1. Pt will be free of fever/chills   OT/EDEMA 6C: Patient with evident improvement in edema over dorsum of feet and up thighs with daytime use of JOBST compression stockings and with only 1-2+ pitting edema remaining. Okay for patient to proceed with daytime wear of thigh high/20-30mmHg/medium JOBST compression stockings and use of JUXTA-FIT personal compression garments for night time wear for optimal results in edema management. *Please see updated patient care order for details*. Stockings to be discontinued if increased pain, numbness/tingling or soiling occurs.

## 2017-07-11 NOTE — PROGRESS NOTES
CLINICAL NUTRITION SERVICES    Per RN, pt with a surplus of Beneprotein in her room and requesting this to be prn (not scheduled)    INTERVENTIONS:  Implementation:  Medical food supplement therapy: Changed Beneprotein with 1% milk to prn.     Follow up/Monitoring:  Will continue to follow pt.    Inés Brown, MS, RD, LD, [facility]   6C Pgr:  [phone]

## 2017-07-11 NOTE — PLAN OF CARE
Problem: Goal Outcome Summary  Goal: Goal Outcome Summary  1. Pt will be free of fever/chills   PT/CR/6C     Pt ambulated 100' + 20' + <15' x2 w/ FWW, w/c follow, and CGA. Pt attempted step up to one stair, but was unable to reach enough hip and knee flexion, pt able to step up onto aerobic stair w/ CGA. Pt performed 6:15 on Nustep, workload 2, and reported OMNI of 7 w/ normal cardiovascular response. Pt SBA for supine>sit and max A for B LEs when sit>supine.  Pt still well below her baseline level of functional IND; pt remains highly motivated.    Recommend discharge to ARU to continue to work on functional independence, strength, and activity tolerance.

## 2017-07-12 ENCOUNTER — APPOINTMENT (OUTPATIENT)
Dept: OCCUPATIONAL THERAPY | Facility: CLINIC | Age: 57
DRG: 871 | End: 2017-07-12
Attending: INTERNAL MEDICINE
Payer: COMMERCIAL

## 2017-07-12 LAB
ANION GAP SERPL CALCULATED.3IONS-SCNC: 10 MMOL/L (ref 3–14)
BACTERIA SPEC CULT: NO GROWTH
BACTERIA SPEC CULT: NO GROWTH
BASOPHILS # BLD AUTO: 0 10E9/L (ref 0–0.2)
BASOPHILS NFR BLD AUTO: 0.5 %
BUN SERPL-MCNC: 28 MG/DL (ref 7–30)
CALCIUM SERPL-MCNC: 8.2 MG/DL (ref 8.5–10.1)
CHLORIDE SERPL-SCNC: 99 MMOL/L (ref 94–109)
CO2 SERPL-SCNC: 25 MMOL/L (ref 20–32)
CREAT SERPL-MCNC: 0.67 MG/DL (ref 0.52–1.04)
CRP SERPL-MCNC: 95 MG/L (ref 0–8)
DIFFERENTIAL METHOD BLD: ABNORMAL
EOSINOPHIL # BLD AUTO: 0 10E9/L (ref 0–0.7)
EOSINOPHIL NFR BLD AUTO: 0 %
ERYTHROCYTE [DISTWIDTH] IN BLOOD BY AUTOMATED COUNT: 23.9 % (ref 10–15)
GFR SERPL CREATININE-BSD FRML MDRD: ABNORMAL ML/MIN/1.7M2
GLUCOSE SERPL-MCNC: 103 MG/DL (ref 70–99)
HCT VFR BLD AUTO: 27.1 % (ref 35–47)
HGB BLD-MCNC: 8.9 G/DL (ref 11.7–15.7)
IMM GRANULOCYTES # BLD: 0 10E9/L (ref 0–0.4)
IMM GRANULOCYTES NFR BLD: 0.5 %
INTERPRETATION ECG - MUSE: NORMAL
LACTATE BLD-SCNC: 3.2 MMOL/L (ref 0.7–2.1)
LYMPHOCYTES # BLD AUTO: 0.5 10E9/L (ref 0.8–5.3)
LYMPHOCYTES NFR BLD AUTO: 23 %
MAGNESIUM SERPL-MCNC: 1.9 MG/DL (ref 1.6–2.3)
MCH RBC QN AUTO: 31 PG (ref 26.5–33)
MCHC RBC AUTO-ENTMCNC: 32.8 G/DL (ref 31.5–36.5)
MCV RBC AUTO: 94 FL (ref 78–100)
MICRO REPORT STATUS: NORMAL
MICRO REPORT STATUS: NORMAL
MONOCYTES # BLD AUTO: 0.5 10E9/L (ref 0–1.3)
MONOCYTES NFR BLD AUTO: 22.1 %
NEUTROPHILS # BLD AUTO: 1.2 10E9/L (ref 1.6–8.3)
NEUTROPHILS NFR BLD AUTO: 53.9 %
NRBC # BLD AUTO: 0 10*3/UL
NRBC BLD AUTO-RTO: 0 /100
PLATELET # BLD AUTO: 51 10E9/L (ref 150–450)
PLATELET # BLD EST: ABNORMAL 10*3/UL
POTASSIUM SERPL-SCNC: 4 MMOL/L (ref 3.4–5.3)
RBC # BLD AUTO: 2.87 10E12/L (ref 3.8–5.2)
SODIUM SERPL-SCNC: 134 MMOL/L (ref 133–144)
SPECIMEN SOURCE: NORMAL
SPECIMEN SOURCE: NORMAL
WBC # BLD AUTO: 2.1 10E9/L (ref 4–11)

## 2017-07-12 PROCEDURE — 25000132 ZZH RX MED GY IP 250 OP 250 PS 637: Performed by: STUDENT IN AN ORGANIZED HEALTH CARE EDUCATION/TRAINING PROGRAM

## 2017-07-12 PROCEDURE — 00000058 ZZHCL STATISTIC BONE MARROW ASP PERF TC 38220: Performed by: STUDENT IN AN ORGANIZED HEALTH CARE EDUCATION/TRAINING PROGRAM

## 2017-07-12 PROCEDURE — 93010 ELECTROCARDIOGRAM REPORT: CPT | Performed by: INTERNAL MEDICINE

## 2017-07-12 PROCEDURE — 97535 SELF CARE MNGMENT TRAINING: CPT | Mod: GO

## 2017-07-12 PROCEDURE — P9047 ALBUMIN (HUMAN), 25%, 50ML: HCPCS | Performed by: INTERNAL MEDICINE

## 2017-07-12 PROCEDURE — 88305 TISSUE EXAM BY PATHOLOGIST: CPT | Performed by: STUDENT IN AN ORGANIZED HEALTH CARE EDUCATION/TRAINING PROGRAM

## 2017-07-12 PROCEDURE — 00000161 ZZHCL STATISTIC H-SPHEME PROCESS B/S: Performed by: STUDENT IN AN ORGANIZED HEALTH CARE EDUCATION/TRAINING PROGRAM

## 2017-07-12 PROCEDURE — 80048 BASIC METABOLIC PNL TOTAL CA: CPT | Performed by: STUDENT IN AN ORGANIZED HEALTH CARE EDUCATION/TRAINING PROGRAM

## 2017-07-12 PROCEDURE — 88275 CYTOGENETICS 100-300: CPT | Performed by: STUDENT IN AN ORGANIZED HEALTH CARE EDUCATION/TRAINING PROGRAM

## 2017-07-12 PROCEDURE — 88341 IMHCHEM/IMCYTCHM EA ADD ANTB: CPT | Performed by: STUDENT IN AN ORGANIZED HEALTH CARE EDUCATION/TRAINING PROGRAM

## 2017-07-12 PROCEDURE — 36415 COLL VENOUS BLD VENIPUNCTURE: CPT | Performed by: STUDENT IN AN ORGANIZED HEALTH CARE EDUCATION/TRAINING PROGRAM

## 2017-07-12 PROCEDURE — 40000611 ZZHCL STATISTIC MORPHOLOGY W/INTERP HEMEPATH TC 85060: Performed by: STUDENT IN AN ORGANIZED HEALTH CARE EDUCATION/TRAINING PROGRAM

## 2017-07-12 PROCEDURE — 25000131 ZZH RX MED GY IP 250 OP 636 PS 637: Performed by: STUDENT IN AN ORGANIZED HEALTH CARE EDUCATION/TRAINING PROGRAM

## 2017-07-12 PROCEDURE — 40000133 ZZH STATISTIC OT WARD VISIT

## 2017-07-12 PROCEDURE — 88365 INSITU HYBRIDIZATION (FISH): CPT | Performed by: STUDENT IN AN ORGANIZED HEALTH CARE EDUCATION/TRAINING PROGRAM

## 2017-07-12 PROCEDURE — 36416 COLLJ CAPILLARY BLOOD SPEC: CPT | Performed by: STUDENT IN AN ORGANIZED HEALTH CARE EDUCATION/TRAINING PROGRAM

## 2017-07-12 PROCEDURE — 86140 C-REACTIVE PROTEIN: CPT | Performed by: STUDENT IN AN ORGANIZED HEALTH CARE EDUCATION/TRAINING PROGRAM

## 2017-07-12 PROCEDURE — 83605 ASSAY OF LACTIC ACID: CPT | Performed by: STUDENT IN AN ORGANIZED HEALTH CARE EDUCATION/TRAINING PROGRAM

## 2017-07-12 PROCEDURE — 88271 CYTOGENETICS DNA PROBE: CPT | Performed by: STUDENT IN AN ORGANIZED HEALTH CARE EDUCATION/TRAINING PROGRAM

## 2017-07-12 PROCEDURE — 88264 CHROMOSOME ANALYSIS 20-25: CPT | Performed by: STUDENT IN AN ORGANIZED HEALTH CARE EDUCATION/TRAINING PROGRAM

## 2017-07-12 PROCEDURE — 88261 CHROMOSOME ANALYSIS 5: CPT | Performed by: STUDENT IN AN ORGANIZED HEALTH CARE EDUCATION/TRAINING PROGRAM

## 2017-07-12 PROCEDURE — 40000951 ZZHCL STATISTIC BONE MARROW INTERP TC 85097: Performed by: STUDENT IN AN ORGANIZED HEALTH CARE EDUCATION/TRAINING PROGRAM

## 2017-07-12 PROCEDURE — G0364 BONE MARROW ASPIRATE &BIOPSY: HCPCS | Performed by: INTERNAL MEDICINE

## 2017-07-12 PROCEDURE — 40000424 ZZHCL STATISTIC BONE MARROW CORE PERF TC 38221: Performed by: STUDENT IN AN ORGANIZED HEALTH CARE EDUCATION/TRAINING PROGRAM

## 2017-07-12 PROCEDURE — 83735 ASSAY OF MAGNESIUM: CPT | Performed by: STUDENT IN AN ORGANIZED HEALTH CARE EDUCATION/TRAINING PROGRAM

## 2017-07-12 PROCEDURE — 25000132 ZZH RX MED GY IP 250 OP 250 PS 637

## 2017-07-12 PROCEDURE — 25000125 ZZHC RX 250: Performed by: STUDENT IN AN ORGANIZED HEALTH CARE EDUCATION/TRAINING PROGRAM

## 2017-07-12 PROCEDURE — 88184 FLOWCYTOMETRY/ TC 1 MARKER: CPT | Performed by: STUDENT IN AN ORGANIZED HEALTH CARE EDUCATION/TRAINING PROGRAM

## 2017-07-12 PROCEDURE — 88311 DECALCIFY TISSUE: CPT | Performed by: STUDENT IN AN ORGANIZED HEALTH CARE EDUCATION/TRAINING PROGRAM

## 2017-07-12 PROCEDURE — 21400006 ZZH R&B CCU INTERMEDIATE UMMC

## 2017-07-12 PROCEDURE — 07DR3ZX EXTRACTION OF ILIAC BONE MARROW, PERCUTANEOUS APPROACH, DIAGNOSTIC: ICD-10-PCS | Performed by: INTERNAL MEDICINE

## 2017-07-12 PROCEDURE — 88313 SPECIAL STAINS GROUP 2: CPT | Performed by: STUDENT IN AN ORGANIZED HEALTH CARE EDUCATION/TRAINING PROGRAM

## 2017-07-12 PROCEDURE — 25000132 ZZH RX MED GY IP 250 OP 250 PS 637: Performed by: INTERNAL MEDICINE

## 2017-07-12 PROCEDURE — 88185 FLOWCYTOMETRY/TC ADD-ON: CPT | Performed by: STUDENT IN AN ORGANIZED HEALTH CARE EDUCATION/TRAINING PROGRAM

## 2017-07-12 PROCEDURE — 40000795 ZZHCL STATISTIC DNA PROCESS AND HOLD: Performed by: STUDENT IN AN ORGANIZED HEALTH CARE EDUCATION/TRAINING PROGRAM

## 2017-07-12 PROCEDURE — 88342 IMHCHEM/IMCYTCHM 1ST ANTB: CPT | Performed by: STUDENT IN AN ORGANIZED HEALTH CARE EDUCATION/TRAINING PROGRAM

## 2017-07-12 PROCEDURE — 88161 CYTOPATH SMEAR OTHER SOURCE: CPT | Performed by: STUDENT IN AN ORGANIZED HEALTH CARE EDUCATION/TRAINING PROGRAM

## 2017-07-12 PROCEDURE — 87040 BLOOD CULTURE FOR BACTERIA: CPT | Performed by: STUDENT IN AN ORGANIZED HEALTH CARE EDUCATION/TRAINING PROGRAM

## 2017-07-12 PROCEDURE — 40001005 ZZHCL STATISTIC FLOW >15 ABY TC 88189: Performed by: STUDENT IN AN ORGANIZED HEALTH CARE EDUCATION/TRAINING PROGRAM

## 2017-07-12 PROCEDURE — 25000128 H RX IP 250 OP 636: Performed by: STUDENT IN AN ORGANIZED HEALTH CARE EDUCATION/TRAINING PROGRAM

## 2017-07-12 PROCEDURE — 88237 TISSUE CULTURE BONE MARROW: CPT | Performed by: STUDENT IN AN ORGANIZED HEALTH CARE EDUCATION/TRAINING PROGRAM

## 2017-07-12 PROCEDURE — 38221 DX BONE MARROW BIOPSIES: CPT | Performed by: INTERNAL MEDICINE

## 2017-07-12 PROCEDURE — 31000247 ZZHCL STATISTICAL NEEDLE GAUGE EA SUPPLY: Performed by: STUDENT IN AN ORGANIZED HEALTH CARE EDUCATION/TRAINING PROGRAM

## 2017-07-12 PROCEDURE — 25000132 ZZH RX MED GY IP 250 OP 250 PS 637: Performed by: NURSE PRACTITIONER

## 2017-07-12 PROCEDURE — 25000128 H RX IP 250 OP 636: Performed by: INTERNAL MEDICINE

## 2017-07-12 PROCEDURE — 97602 WOUND(S) CARE NON-SELECTIVE: CPT

## 2017-07-12 PROCEDURE — 88280 CHROMOSOME KARYOTYPE STUDY: CPT | Performed by: STUDENT IN AN ORGANIZED HEALTH CARE EDUCATION/TRAINING PROGRAM

## 2017-07-12 PROCEDURE — 25000125 ZZHC RX 250: Performed by: INTERNAL MEDICINE

## 2017-07-12 PROCEDURE — 85025 COMPLETE CBC W/AUTO DIFF WBC: CPT | Performed by: STUDENT IN AN ORGANIZED HEALTH CARE EDUCATION/TRAINING PROGRAM

## 2017-07-12 RX ORDER — ALBUMIN (HUMAN) 12.5 G/50ML
25 SOLUTION INTRAVENOUS ONCE
Status: COMPLETED | OUTPATIENT
Start: 2017-07-12 | End: 2017-07-12

## 2017-07-12 RX ORDER — NAPROXEN 250 MG/1
250 TABLET ORAL 2 TIMES DAILY WITH MEALS
Status: DISCONTINUED | OUTPATIENT
Start: 2017-07-12 | End: 2017-07-14

## 2017-07-12 RX ORDER — ATENOLOL 50 MG/1
50 TABLET ORAL 2 TIMES DAILY
Status: DISCONTINUED | OUTPATIENT
Start: 2017-07-13 | End: 2017-07-13

## 2017-07-12 RX ORDER — DILTIAZEM HYDROCHLORIDE 30 MG/1
30 TABLET, FILM COATED ORAL EVERY 6 HOURS SCHEDULED
Status: DISCONTINUED | OUTPATIENT
Start: 2017-07-12 | End: 2017-07-12

## 2017-07-12 RX ORDER — FUROSEMIDE 10 MG/ML
20 INJECTION INTRAMUSCULAR; INTRAVENOUS ONCE
Status: COMPLETED | OUTPATIENT
Start: 2017-07-12 | End: 2017-07-12

## 2017-07-12 RX ORDER — ALBUMIN (HUMAN) 12.5 G/50ML
25 SOLUTION INTRAVENOUS ONCE
Status: DISCONTINUED | OUTPATIENT
Start: 2017-07-12 | End: 2017-07-12

## 2017-07-12 RX ADMIN — Medication 2 G: at 14:51

## 2017-07-12 RX ADMIN — FUROSEMIDE 20 MG: 10 INJECTION, SOLUTION INTRAVENOUS at 14:55

## 2017-07-12 RX ADMIN — GABAPENTIN 200 MG: 100 CAPSULE ORAL at 20:55

## 2017-07-12 RX ADMIN — ALBUMIN (HUMAN) 25 G: 12.5 SOLUTION INTRAVENOUS at 08:40

## 2017-07-12 RX ADMIN — MICONAZOLE NITRATE: 2 POWDER TOPICAL at 19:24

## 2017-07-12 RX ADMIN — MIDAZOLAM 0.75 MG: 1 INJECTION INTRAMUSCULAR; INTRAVENOUS at 10:35

## 2017-07-12 RX ADMIN — Medication 2 G: at 14:53

## 2017-07-12 RX ADMIN — PREDNISONE 7.5 MG: 5 TABLET ORAL at 08:51

## 2017-07-12 RX ADMIN — MIDODRINE HYDROCHLORIDE 10 MG: 5 TABLET ORAL at 17:49

## 2017-07-12 RX ADMIN — MICONAZOLE NITRATE 1 APPLICATOR: 2 POWDER TOPICAL at 12:54

## 2017-07-12 RX ADMIN — CALCIUM CARBONATE 600 MG (1,500 MG)-VITAMIN D3 400 UNIT TABLET 2 TABLET: at 08:56

## 2017-07-12 RX ADMIN — MULTIPLE VITAMINS W/ MINERALS TAB 1 TABLET: TAB at 08:52

## 2017-07-12 RX ADMIN — ACETAMINOPHEN 650 MG: 325 TABLET, FILM COATED ORAL at 14:27

## 2017-07-12 RX ADMIN — ACETAMINOPHEN 650 MG: 325 TABLET, FILM COATED ORAL at 05:24

## 2017-07-12 RX ADMIN — MIDODRINE HYDROCHLORIDE 10 MG: 5 TABLET ORAL at 08:57

## 2017-07-12 RX ADMIN — POTASSIUM CHLORIDE 40 MEQ: 750 TABLET, EXTENDED RELEASE ORAL at 08:52

## 2017-07-12 RX ADMIN — ACETAMINOPHEN 650 MG: 325 TABLET, FILM COATED ORAL at 22:29

## 2017-07-12 RX ADMIN — DILTIAZEM HYDROCHLORIDE 30 MG: 30 TABLET, FILM COATED ORAL at 12:53

## 2017-07-12 RX ADMIN — ATENOLOL 75 MG: 50 TABLET ORAL at 08:51

## 2017-07-12 RX ADMIN — POTASSIUM CHLORIDE 20 MEQ: 750 TABLET, EXTENDED RELEASE ORAL at 12:53

## 2017-07-12 RX ADMIN — NAPROXEN 250 MG: 250 TABLET ORAL at 17:49

## 2017-07-12 RX ADMIN — PANTOPRAZOLE SODIUM 40 MG: 40 TABLET, DELAYED RELEASE ORAL at 05:24

## 2017-07-12 RX ADMIN — GABAPENTIN 200 MG: 100 CAPSULE ORAL at 14:27

## 2017-07-12 RX ADMIN — DIGOXIN 125 MCG: 125 TABLET ORAL at 08:57

## 2017-07-12 RX ADMIN — FUROSEMIDE 40 MG: 10 INJECTION, SOLUTION INTRAVENOUS at 12:53

## 2017-07-12 RX ADMIN — GABAPENTIN 200 MG: 100 CAPSULE ORAL at 08:51

## 2017-07-12 NOTE — PLAN OF CARE
Problem: Goal Outcome Summary  Goal: Goal Outcome Summary  1. Pt will be free of fever/chills   OT-6C: Cancel. Pt having BM biopsy. Will reschedule.

## 2017-07-12 NOTE — PROGRESS NOTES
Was present with patient during bone marrow biopsy. Administered .75 mg versed. Patient was largely calm and comfortable throughout procedure. No overt effects of conscious sedation noted.

## 2017-07-12 NOTE — PROGRESS NOTES
Social Work Services Progress Note    Hospital Day: 24  Date of Initial Social Work Evaluation:  6/20/17  Collaborated with:  Cards 1 team    Data:  Pt is a 56 year old female who was transferred from [facility].   following for placement back at [facility] as able.    Intervention:  Discussed needs with Cards 1.  Pt continues to be medically complex and Cards 1 team reports no condition changes that would start the discharge planning process.  Plan is to continue with testing to determine pt's medical course and illness.    Assessment:  No change, spouse continues to provide daily cares at bedside.  Pt and family aware of how to contact  for needs.    Plan:    Anticipated Disposition:  Facility:  Return to [facility] as able.    Barriers to d/c plan:  Medical stability    Follow Up:   follow for discharge planning.    DENNIS Larsen, APSW  6C Unit   Phone: [phone]  Pager: [phone]  Unit: [phone]      ___________________________________________________________________________________________________________________________________________________    Referrals in process:   [facility] - return when medically stable.     Referrals Discontinued:  None    Community Case Management/Community Services in place:   None

## 2017-07-12 NOTE — PLAN OF CARE
Problem: Goal Outcome Summary  Goal: Goal Outcome Summary  1. Pt will be free of fever/chills   PT/CR/6C     Pt Cx today. Pt attempted x3 during the day.   Attempted x2 in the AM and x1 in PM.   Pt w/ Lymphedema on 1st attempt; refusing d/t proximity to bone marrow biopsy on 2nd attempt; refusing d/t fatigue and bradycardia on 3rd attempt.     Will reschedule pt for tomorrow.

## 2017-07-12 NOTE — PLAN OF CARE
Problem: Goal Outcome Summary  Goal: Goal Outcome Summary  1. Pt will be free of fever/chills   OT/EDEMA 6C: Patient able to tolerate Jobst stockings during daytime hours and Juxta-fit personal compression garments at night time for optimal edema management with only complaint being itching sensation around silicone band of Jobst stockings. 1+ pitting edema present from toe line to knee creases and 2+ pitting edema from knee creases to groin, which patient pleased by. Okay to proceed with wear of thigh high/20-30mmHg/medium Jobst stockings during daytime hours only and Juxta-fit personal compression garments at night time. See patient care order for details. Please discontinue use of compression if increased pain, numbness/tingling or soiling occurs.

## 2017-07-12 NOTE — PROGRESS NOTES
Cardiology Daily Note   Date of Service: 7/9/2017  Patient: Amelia Michel  MRN: [id number]  Admission Date: 6/19/2017  Hospital Day # 23    Assessment & Plan:   Amelia Michel is a 56 year old female with PMHx of VSD s/p repair (1969), RA, recently diagnosed atrial fibrillation that was complicated with an OOH cardiac arrest (felt 2/2 long pause with degeneration to VFib while converting Afib to SR with multiple AV prieto agents) who was readmitted from ARU on 6/19 with fever and concern for worsening LUE infection. She is now transferred back to cardiology service as primary on 6/26 for further workup and evaluation of recurrent lactic acidosis, persistent afib and softer blood pressures with tachypnea.     # Volume overload/anasarca:  # HFrEF (EF 50-55%):  # Hypotension:  Patient initially up 35# at admission; diuresis initially challenging, secondary to third spacing of fluid (albumin 1.9), but briskly responded with addition of midodrine and ongoing albumin resuscitation. Right heart cath 6/26 with mildly elevated right and left sided filling presures  - Gentle diuresis with furosemide, 60 mg 7/12/17  - Tapering midodrine, currently 10 mg BID (from 10 mg TID)    # Atrial fibrillation/flutter with RVR:  RVR in setting of volume overload above.  - Continue dig 125mcg  - Atenolol 75 mg daily   - Diltiazem 30 mg SA 7/12/17   - Not anticoagulated due to thrombocytopenia    # Out of hospital arrest:  Pt will need to wear LifeVest until medically appropriate and cleared from infection/wound standpoint for ICD implantation likely 4-6 weeks after discharge per EP (pending hospital course for LUE wound)    # Fever of unknown origin  Previously attributes to LUE wound, though patient has continued to have fevers despite broad spectrum antibiotics. ID consulted and feels that unlikely secondary to infection. Discontinued antibiotics with close monitoring. Patient without DVT, no indolent nidus of infection noted  on MRI spine, LUE wound continues to improve without antibiotics.   - Patient neutropenic, not to degree to warrant antibiotics for fever at present  - Heme/onc following, appreciate recs  - Bmbx planned for 7/12/17   - Will consider BRE and PET pending results from bmbx      # Acute on Chronic Thrombocytopenia:   2/2 hypoproliferative bone marrow. Plts noted to decrease from 104 to as low as 15 during recent hospitalization requiring transfusion w/ chronically low in the 's as outpatient. Possible bactrim is offending agent for exacerbation, and discontinued this medication in the setting of worsening cellulitis.   -- If this is chronic ITP, no need for treatment unless plts drift < 10s if not bleeding  -- Continue to hold RA meds for now  -- D/C apixiban (last dose 7/8/17)    # Acute on chronic anemia: Stable  No evidence of overt bleeding.   - Daily CBC    # RA:   History of seropositive rheumatoid arthritis (anti-CCP positive) since late 1980;s w/ multiple MTP osteotomies, partial right wrist fusion, longstanding therapy consisting of MTX, prednisone and HCQ  -- Continue with Prednisone taper. Now on 10 mg daily, taper to 7.5 mg on 7/1 then to 5 mg daily after 2 weeks if continues to have low disease activity  -- Continue to hold HCQ and MTX until outpatient follow-up  -- Follow-up with [facility] Dr. Conor Cunha in 4-6 weeks after discharge      # Severe Critical Illness Myopathy:   Significant deconditioning w/ median neuropathies most likely localized to the wrists and ulnar neuropathies most likely localized to the elbows secondary to RA.  -- Ongoing extensive PT/OT/SLP      # Malnutrition:   -- Discontinued tube feeds due to improved PO intake  -- Remove NG    Consulting Services: Nutrition, heme/onc    CODE: Full Code  DVT Ppx: Apixaban resumed 6/28  Diet/Fluids: As above.  Disposition: Pending improvement in above.    Pt's care was discussed with bedside RN and patient during Care  "Team Rounds.    Patient was discussed and evaluated with Dr. Mclain.    Kelly Luis  Internal Medicine PGY2  [phone]    ___________________________________________________________________    Subjective & Interval History:     Last 24 hr care team notes reviewed. Patient with episode of fever this morning. No joint pain. No rashes. Breathing is unchanged from yesterday. Swelling is stable. No cough, no diarrhea.    4 point ROS otherwise negative.    Medications: Reviewed in EPIC. List below for reference    Physical Exam:    Blood pressure 99/65, pulse 115, temperature 98.6  F (37  C), temperature source Oral, resp. rate 17, height 1.48 m (4' 10.25\"), weight 75.5 kg (166 lb 6.4 oz), SpO2 97 %, not currently breastfeeding.    CONSTITUTIONAL:  Ms. Michel is sitting up in chair in no apparent distress.  HEENT: MMM. EOMI. +JVD to midway to angle of the jaw.  LUNGS: Normal respiratory effort without increased work of breathing. No wheezes appreciated. Diminished in bilateral bases.   CARDIOVASCULAR: Irregularly irregular, tachycardic w/ no M/R/G.   ABDOMEN: Soft, nondistended, NT, BS +ve.   MUSCULOSKELETAL: Moves all four extremities without difficulty. Diffuse anasarca.  NEURO: A&Ox3, CN II-XII grossly intact, non-focal.     Labs & Studies of Note: Reviewed in Epic  CMP    Recent Labs  Lab 07/12/17  0641 07/11/17  0824 07/10/17  0546 07/09/17  1944 07/09/17  0758 07/08/17  0733    136 136 135 136 134   POTASSIUM 4.0 3.8 3.8 3.7 3.7 3.8   CHLORIDE 99 101 100 99 100 98   CO2 25 29 29 27 28 30   ANIONGAP 10 6 7 10 8 6   * 94 92 188* 98 101*   BUN 28 24 24 24 26 28   CR 0.67 0.66 0.59 0.67 0.62 0.61   GFRESTIMATED >90Non  GFR Calc >90Non  GFR Calc >90Non  GFR Calc >90Non  GFR Calc >90Non  GFR Calc >90Non  GFR Calc   GFRESTBLACK >90African American GFR Calc >90African American GFR Calc >90African American GFR Calc " >90African American GFR Calc >90African American GFR Calc >90African American GFR Calc   VIKTORIYA 8.2* 8.4* 8.1* 7.9* 8.3* 8.2*   MAG 1.9 1.7  --   --  2.0 2.0   PHOS  --  3.1  --   --  2.7  --        CBC    Recent Labs  Lab 07/12/17  0641 07/10/17  0546 07/08/17  1308 07/07/17  0715   WBC 2.1* 1.6* 2.6* 1.4*   RBC 2.87* 2.44* 2.86* 2.41*   HGB 8.9* 7.5* 8.7* 7.4*   HCT 27.1* 23.0* 26.5* 22.5*   PLT 51* 43* 45* 27*       INR    Recent Labs  Lab 07/10/17  0546 07/08/17  0914   INR 1.43* 1.68*     Medication List for Reference (delete if desired)  Current Facility-Administered Medications   Medication     naproxen (NAPROSYN) tablet 250 mg     diltiazem (CARDIZEM) tablet 30 mg     midodrine (PROAMATINE) tablet 10 mg     acetaminophen (TYLENOL) tablet 650 mg     potassium chloride SA (K-DUR/KLOR-CON M) CR tablet 40 mEq     atenolol (TENORMIN) tablet 75 mg     furosemide (LASIX) injection 40 mg     miconazole (MICATIN; MICRO GUARD) 2 % powder     predniSONE (DELTASONE) tablet 7.5 mg     lidocaine (LMX4) kit     calcium carbonate (TUMS) chewable tablet 500 mg     pantoprazole (PROTONIX) EC tablet 40 mg     digoxin (LANOXIN) tablet 125 mcg     ondansetron (ZOFRAN) tablet 4 mg     prochlorperazine (COMPAZINE) injection 5-10 mg     ondansetron (ZOFRAN) injection 4 mg     simethicone (MYLICON) chewable tablet 80 mg     sodium chloride (PF) 0.9% PF flush 10-20 mL     sodium chloride (PF) 0.9% PF flush 10 mL     heparin lock flush 10 UNIT/ML injection 5-10 mL     dextrose 10 % 1,000 mL infusion     gabapentin (NEURONTIN) capsule 200 mg     melatonin tablet 3 mg     morphine 0.1% in solosite topical gel 2 g     multivitamin, therapeutic with minerals (THERA-VIT-M) tablet 1 tablet     triamcinolone (KENALOG) 0.1 % ointment     lidocaine 1 % 1 mL     lidocaine (LMX4) kit     sodium chloride (PF) 0.9% PF flush 3 mL     sodium chloride (PF) 0.9% PF flush 3 mL     medication instruction     acetaminophen (TYLENOL) Suppository 650 mg      Patient is already receiving anticoagulation with heparin, enoxaparin (LOVENOX), warfarin (COUMADIN)  or other anticoagulant medication     glucose 40 % gel 15-30 g    Or     dextrose 50 % injection 25-50 mL    Or     glucagon injection 1 mg     naloxone (NARCAN) injection 0.1-0.4 mg     calcium-vitamin D (CALTRATE) 600-400 MG-UNIT per tablet 2 tablet     morphine 0.1% in solosite topical gel     potassium chloride SA (K-DUR/KLOR-CON M) CR tablet 20-40 mEq     potassium chloride (KLOR-CON) Packet 20-40 mEq     potassium chloride 10 mEq in 100 mL intermittent infusion     potassium chloride 10 mEq in 100 mL intermittent infusion with 10 mg lidocaine     potassium chloride 20 mEq in 50 mL intermittent infusion     magnesium sulfate 2 g in NS intermittent infusion (PharMEDium or FV Cmpd)     magnesium sulfate 4 g in 100 mL sterile water (premade)     sodium phosphate 10 mmol in D5W intermittent infusion     sodium phosphate 15 mmol in D5W intermittent infusion     sodium phosphate 20 mmol in D5W intermittent infusion     sodium phosphate 25 mmol in D5W intermittent infusion     melatonin tablet 1 mg     traMADol (ULTRAM) half-tab 25 mg

## 2017-07-12 NOTE — PROVIDER NOTIFICATION
Pt's heart rate jumped to A fib 140s. Pt stated she was dry heaving due to dinner per patient report. Cards Crosscover paged and notified. EKG ordered. Temp 102.5 and 102.8-Tylenol given. Blood cultures and labs ordered.     0530-Cards crosscover at bedside. This nurse was told to page team if HR does not decrease within 30 minutes of tylenol administration.    Scarlet Hinojosa RN 07/12/17 5:39 AM      Addendum: 0630-Notified Cards Crosscover of continued HR in the 140s. Awaiting orders for fluids. EKG-Done. Lab and cultures drawn.

## 2017-07-12 NOTE — PLAN OF CARE
Problem: Sepsis (Adult)  Goal: Signs and Symptoms of Listed Potential Problems Will be Absent or Manageable (Sepsis)  Signs and symptoms of listed potential problems will be absent or manageable by discharge/transition of care (reference Sepsis (Adult) CPG).   Outcome: No Change  D/I: Monitor shows A-fib/flutter 120s.   HR up to 140-150s since fever. Received 30 mg PO diltiazem at 1400. Temps to 102.8 overnight. 98.4 this shift. Receiving scheduled tylenol every 8 hours. Bone marrow biopsy this AM with residual hip pain. Comfortable while in bed. O2 on and off for comfort. Left arm wound changed by WOC RN, states looks better. Continued urinary incontinence. See flowsheets for assessments and additional data.  A: No overt appearance of sepsis. Continued wound drainage. Continued high heart rate.    P: Continue wound cares per plan of care. Monitor temps and symptoms. Encourage increased activity and participation in cares. Continue current cares and notify providers with questions or concerns.     07/12/17 1530   Sepsis   Problems Assessed (Sepsis) all   Problems Present (Sepsis) situational response

## 2017-07-12 NOTE — PLAN OF CARE
Problem: Goal Outcome Summary  Goal: Goal Outcome Summary  1. Pt will be free of fever/chills   Outcome: No Change  D: Fever of unknown source admitted from acute rehab. Fever spiked to 102.8 at 0515, see previous note.      I: Turned patient at her request. JUXTA-FIT (wraps on legs) at night per care order. 2L NC for SpO2 <90%. Depend changex2, skin intact-barrier cream and powder applied. Awaiting pharmacy check of albumin ordered, day nurse will administer.      A:A/Ox4. Denies pain. Room air to 2L NC. Lungs-Diminished in bases. Afib 95-120s. Active bowel sounds, no BM overnight. Adequate urine output. PIV. Left arm mepilex in place-drainage noted from medihoney, dsg intact. Up with assistx1.   Temp:  [98.2  F (36.8  C)-102.8  F (39.3  C)] 100.8  F (38.2  C)  Pulse:  [115] 115  Heart Rate:  [] 144  Resp:  [16-22] 20  BP: ()/(57-97) 99/70  SpO2:  [90 %-98 %] 98 %     P: Patient and spouse requesting a derm consult to look at spot on her head. Bone marrow biopsy scheduled for 1030. Weight will need to be done when patient's heart rate decreases and lightheadedness resolves. Continue to monitor and assess with reports of concerns to Cards 1 team.     Scarlet Hinojosa RN 07/12/17 8:18 AM    Hours of Care [id number]

## 2017-07-12 NOTE — PROGRESS NOTES
Today met with Dr. Norman, Amelia Michel and her  to discuss atrial fibrillation rate control and future ppm placement.   Reviewed her atrial fibrillation rates and recommended atenolol 50 mg BID to help obtain better rate control.  At this point we will not pursue ppm due to fevers of unknown origin and wound on her left arm.  Will continue to monitor.  ELIZABETH Martinez, CNP

## 2017-07-12 NOTE — PROCEDURES
[facility], [place]  Procedure Note               Bone Marrow Biopsy or Aspiration:       Amelia Michel  MRN# [id number]    Diagnosis or Indication: Pancytopenia      Time-out was called to confirm: patient s name and date of birth, procedure, side and site of biopsy, safety procedures followed.    - Performed by: self.    - Supervising Attending : Dr. Silverman  - Informed consent: signed by patient.    - Aspiration and biopsy site: [left] superior posterior iliac crest.   - Patient position: [right lateral decubitus]  - Preparation and technique: sterile preparation of site with Chloraprep, draped to expose aspirate/biopsy area, local anesthesia with 1% lidocaine (approximately 10ml), frequent pressure application on incision to maintain hemostasis.    - Tissue obtained: bone marrow aspirate and biopsy were successfully obtained in sterile manner.  - Toleration of procedure and any complications: slight localized bleeding (<5ml). Patient tolerated procedure well with minimal pain.      Premedication per physician order (see patient's chart).     {   How many sites?                    : Lt iliac crest

## 2017-07-12 NOTE — PROGRESS NOTES
[facility] Nurse Inpatient Wound Assessment     Re Assessment of wound(s) on pt's:   Left arm    Data:     Patient History:      per MD note(s):  56 year old woman with hx of VSD repair (1969), Rheumatoid arthritis, and a recent diagnosis of Afib/Aflutter/SVT, who presented to [facility] on 5/29 after Out of Hospital cardiac arrest with shockable rhythm. After ROSC in the field, she was admitted from 5/29-6/15, admitted to ARU 6/15.  admitted from ARU on 6/19 with fever and concern for worsening LUE infection.  She is now transferred back to cardiology service as primary on 6/26 for further workup and evaluation of recurrent lactic acidosis, persistent afib and softer blood pressures with tachypnea.    Continues to spike fevers, previously attributes to LUE wound, though patient has continued to have fevers despite broad spectrum antibiotics. ID consulted and feels that unlikely secondary to infection     left arm extravasation site    Moisture Management:  Dressings    Current Diet / Nutrition:         Active Diet Order      Regular Diet Adult        Jay Score  Avg: 15.6  Min: 9  Max: 23   Recent Labs   Lab Test  07/12/17   0641  07/10/17   0546   07/06/17   0704   06/28/17   0555   05/31/17   1018   ALBUMIN   --    --    --    --    --   1.5*   < >  1.9*   HGB  8.9*  7.5*   < >   --    < >  6.9*   < >  8.3*   INR   --   1.43*   < >   --    < >   --    < >  3.38*   WBC  2.1*  1.6*   < >   --    < >  2.4*   < >  7.5   A1C   --    --    --    --    --    --    --   Canceled, Test credited   UNABLE TO CALCULATE % HBA1C DUE TO LOW HGB AND/OR LOW HGBA1C  NOTIFIED CHELSEA BEE RN AT 1253 ON 5/31/17 [facility]     CRP   --    --    --   61.0*   < >   --    < >   --     < > = values in this interval not displayed.         Wound Assessment :   Left arm  Wound History:  Extravasation wound with large area of epidermal loss                              7/5/17                                                7/12/17      Wound Base: 85% adherent tan slough/necrosis surrounded by 10% thin yellow slough and 5% granulation.      Specific Dimensions (length x width x depth, in cm) :   10 x 2.8 x 0.5 cm     Palpation of the wound bed:  edema    Periwound Skin: intact, minimal pink erythema     Drainage:  .Amount: moderate . Color: thick yellow liquefying slough and thin yellow serous     Odor: none  Pain:  Minimal, increases without the use of morphine gel.        Intervention:     Patient's chart evaluated.      Wound(s) was assessed    Wound Care: was done: changed dressing, Visual inspection    Orders reviewed  Discussed plan of care with  Patient and nurse,        Assessment:       Large area of epidermal loss with potential full thickness skin loss at area of necrosis 2/2 extravastion.  Measurements unchanged but slough is loosening well and beginning to debride with use of Medihoney.  Pain level has increased only slightly with use of Medihoney   Recommend hand therapy soon to avoid contracture        Plan:     Nursing to notify the Provider(s) and re-consult the [facility] Nurse if wound(s) deteriorate(s).    Plan of care for wound located on left arm: Change entire dressing daily, clean wound and skin around wound with Microklenz.     Aply IN THIS ORDER:    -Apply Cavilon no-sting to periwound skin.    -Apply Morphine gel to wound edges.    -Apply Medihoney nickel thick to the areas with slough.      -Cover with Mepilex border dressings.      [facility] Nurse will return: weekly     Face to face time: 20 minutes

## 2017-07-12 NOTE — PROGRESS NOTES
Social Work Services Progress Note    Hospital Day: 24  Date of Initial Social Work Evaluation:  6/20/17  Collaborated with:  Cards 1 team    Data:  Pt is a 56 year old female who [facility]   following for placement back at ARU as able.    Intervention:  Discussed needs with Cards 1.  Pt continues to have medical testing as the team is unclear of pt's source of fevers.  Per Cards 1 notes yesterday ID team has signed off, and Cards 1 will continue with tests to determine pt's medical needs.  Will wait for outcome of pt's bone marrow biopsy and further testing from Cards 1 team.  [facility] to [facility] for when pt medically stable to transfer to rehab care.  Discharge date continues to be unknown at this time due to medical complexity.    Assessment:  No change, spouse continues to provide daily cares at bedside.  Pt and family aware of how to contact  for needs.    Plan:    Anticipated Disposition:  Facility:  Return to [facility] as able.    Barriers to d/c plan:  Medical stability    Follow Up:   follow for discharge planning.    DENNIS Larsen, APSW  6C Unit   Phone: [phone]  Pager: [phone]  Unit: [phone]      ___________________________________________________________________________________________________________________________________________________    Referrals in process:    ARU - return when medically stable.     Referrals Discontinued:  None    Community Case Management/Community Services in place:   None

## 2017-07-13 ENCOUNTER — APPOINTMENT (OUTPATIENT)
Dept: PHYSICAL THERAPY | Facility: CLINIC | Age: 57
DRG: 871 | End: 2017-07-13
Attending: INTERNAL MEDICINE
Payer: COMMERCIAL

## 2017-07-13 ENCOUNTER — APPOINTMENT (OUTPATIENT)
Dept: CT IMAGING | Facility: CLINIC | Age: 57
DRG: 871 | End: 2017-07-13
Attending: INTERNAL MEDICINE
Payer: COMMERCIAL

## 2017-07-13 LAB
ANION GAP SERPL CALCULATED.3IONS-SCNC: 8 MMOL/L (ref 3–14)
ANION GAP SERPL CALCULATED.3IONS-SCNC: 9 MMOL/L (ref 3–14)
BACTERIA SPEC CULT: NO GROWTH
BACTERIA SPEC CULT: NO GROWTH
BASOPHILS # BLD AUTO: 0 10E9/L (ref 0–0.2)
BASOPHILS NFR BLD AUTO: 0.3 %
BUN SERPL-MCNC: 36 MG/DL (ref 7–30)
BUN SERPL-MCNC: 37 MG/DL (ref 7–30)
CALCIUM SERPL-MCNC: 8.1 MG/DL (ref 8.5–10.1)
CALCIUM SERPL-MCNC: 8.5 MG/DL (ref 8.5–10.1)
CHLORIDE SERPL-SCNC: 101 MMOL/L (ref 94–109)
CHLORIDE SERPL-SCNC: 102 MMOL/L (ref 94–109)
CO2 SERPL-SCNC: 26 MMOL/L (ref 20–32)
CO2 SERPL-SCNC: 27 MMOL/L (ref 20–32)
COPATH REPORT: NORMAL
CORTIS SERPL-MCNC: 9.9 UG/DL (ref 4–22)
CREAT SERPL-MCNC: 0.8 MG/DL (ref 0.52–1.04)
CREAT SERPL-MCNC: 0.97 MG/DL (ref 0.52–1.04)
DIFFERENTIAL METHOD BLD: ABNORMAL
EOSINOPHIL # BLD AUTO: 0 10E9/L (ref 0–0.7)
EOSINOPHIL NFR BLD AUTO: 0.3 %
ERYTHROCYTE [DISTWIDTH] IN BLOOD BY AUTOMATED COUNT: 24.9 % (ref 10–15)
GFR SERPL CREATININE-BSD FRML MDRD: 59 ML/MIN/1.7M2
GFR SERPL CREATININE-BSD FRML MDRD: 74 ML/MIN/1.7M2
GLUCOSE SERPL-MCNC: 105 MG/DL (ref 70–99)
GLUCOSE SERPL-MCNC: 143 MG/DL (ref 70–99)
HCT VFR BLD AUTO: 24.9 % (ref 35–47)
HGB BLD-MCNC: 8 G/DL (ref 11.7–15.7)
IMM GRANULOCYTES # BLD: 0 10E9/L (ref 0–0.4)
IMM GRANULOCYTES NFR BLD: 0.3 %
LAB SCANNED RESULT: NORMAL
LAB SCANNED RESULT: NORMAL
LYMPHOCYTES # BLD AUTO: 0.5 10E9/L (ref 0.8–5.3)
LYMPHOCYTES NFR BLD AUTO: 17.2 %
MAGNESIUM SERPL-MCNC: 2.3 MG/DL (ref 1.6–2.3)
MCH RBC QN AUTO: 30.3 PG (ref 26.5–33)
MCHC RBC AUTO-ENTMCNC: 32.1 G/DL (ref 31.5–36.5)
MCV RBC AUTO: 94 FL (ref 78–100)
MICRO REPORT STATUS: NORMAL
MICRO REPORT STATUS: NORMAL
MONOCYTES # BLD AUTO: 0.4 10E9/L (ref 0–1.3)
MONOCYTES NFR BLD AUTO: 13.1 %
NEUTROPHILS # BLD AUTO: 2 10E9/L (ref 1.6–8.3)
NEUTROPHILS NFR BLD AUTO: 68.8 %
NRBC # BLD AUTO: 0 10*3/UL
NRBC BLD AUTO-RTO: 0 /100
PLATELET # BLD AUTO: 45 10E9/L (ref 150–450)
PLATELET # BLD EST: ABNORMAL 10*3/UL
POTASSIUM SERPL-SCNC: 4 MMOL/L (ref 3.4–5.3)
POTASSIUM SERPL-SCNC: 4.7 MMOL/L (ref 3.4–5.3)
RBC # BLD AUTO: 2.64 10E12/L (ref 3.8–5.2)
SODIUM SERPL-SCNC: 135 MMOL/L (ref 133–144)
SODIUM SERPL-SCNC: 137 MMOL/L (ref 133–144)
SPECIMEN SOURCE: NORMAL
SPECIMEN SOURCE: NORMAL
WBC # BLD AUTO: 2.9 10E9/L (ref 4–11)

## 2017-07-13 PROCEDURE — 25000128 H RX IP 250 OP 636: Performed by: STUDENT IN AN ORGANIZED HEALTH CARE EDUCATION/TRAINING PROGRAM

## 2017-07-13 PROCEDURE — 25000132 ZZH RX MED GY IP 250 OP 250 PS 637: Performed by: NURSE PRACTITIONER

## 2017-07-13 PROCEDURE — 21400006 ZZH R&B CCU INTERMEDIATE UMMC

## 2017-07-13 PROCEDURE — 80048 BASIC METABOLIC PNL TOTAL CA: CPT | Performed by: INTERNAL MEDICINE

## 2017-07-13 PROCEDURE — 83735 ASSAY OF MAGNESIUM: CPT | Performed by: INTERNAL MEDICINE

## 2017-07-13 PROCEDURE — 25000132 ZZH RX MED GY IP 250 OP 250 PS 637

## 2017-07-13 PROCEDURE — 25000132 ZZH RX MED GY IP 250 OP 250 PS 637: Performed by: INTERNAL MEDICINE

## 2017-07-13 PROCEDURE — 25000125 ZZHC RX 250: Performed by: STUDENT IN AN ORGANIZED HEALTH CARE EDUCATION/TRAINING PROGRAM

## 2017-07-13 PROCEDURE — 25000128 H RX IP 250 OP 636: Performed by: INTERNAL MEDICINE

## 2017-07-13 PROCEDURE — 25000131 ZZH RX MED GY IP 250 OP 636 PS 637: Performed by: STUDENT IN AN ORGANIZED HEALTH CARE EDUCATION/TRAINING PROGRAM

## 2017-07-13 PROCEDURE — 40000193 ZZH STATISTIC PT WARD VISIT: Performed by: PHYSICAL THERAPIST

## 2017-07-13 PROCEDURE — 36415 COLL VENOUS BLD VENIPUNCTURE: CPT | Performed by: INTERNAL MEDICINE

## 2017-07-13 PROCEDURE — 97530 THERAPEUTIC ACTIVITIES: CPT | Mod: GP | Performed by: PHYSICAL THERAPIST

## 2017-07-13 PROCEDURE — 82533 TOTAL CORTISOL: CPT | Performed by: INTERNAL MEDICINE

## 2017-07-13 PROCEDURE — 25000132 ZZH RX MED GY IP 250 OP 250 PS 637: Performed by: STUDENT IN AN ORGANIZED HEALTH CARE EDUCATION/TRAINING PROGRAM

## 2017-07-13 PROCEDURE — 71260 CT THORAX DX C+: CPT

## 2017-07-13 PROCEDURE — 97116 GAIT TRAINING THERAPY: CPT | Mod: GP | Performed by: PHYSICAL THERAPIST

## 2017-07-13 PROCEDURE — 99232 SBSQ HOSP IP/OBS MODERATE 35: CPT | Mod: GC | Performed by: INTERNAL MEDICINE

## 2017-07-13 PROCEDURE — 85025 COMPLETE CBC W/AUTO DIFF WBC: CPT | Performed by: INTERNAL MEDICINE

## 2017-07-13 RX ORDER — IOPAMIDOL 755 MG/ML
81 INJECTION, SOLUTION INTRAVASCULAR ONCE
Status: COMPLETED | OUTPATIENT
Start: 2017-07-13 | End: 2017-07-13

## 2017-07-13 RX ORDER — MIDODRINE HYDROCHLORIDE 5 MG/1
10 TABLET ORAL
Status: DISCONTINUED | OUTPATIENT
Start: 2017-07-13 | End: 2017-08-02

## 2017-07-13 RX ORDER — FUROSEMIDE 10 MG/ML
40 INJECTION INTRAMUSCULAR; INTRAVENOUS
Status: DISCONTINUED | OUTPATIENT
Start: 2017-07-13 | End: 2017-07-14

## 2017-07-13 RX ORDER — FUROSEMIDE 10 MG/ML
30 INJECTION INTRAMUSCULAR; INTRAVENOUS
Status: DISCONTINUED | OUTPATIENT
Start: 2017-07-13 | End: 2017-07-13

## 2017-07-13 RX ORDER — DILTIAZEM HYDROCHLORIDE 30 MG/1
30 TABLET, FILM COATED ORAL ONCE
Status: DISCONTINUED | OUTPATIENT
Start: 2017-07-13 | End: 2017-07-13

## 2017-07-13 RX ORDER — DILTIAZEM HYDROCHLORIDE 30 MG/1
30 TABLET, FILM COATED ORAL EVERY 8 HOURS SCHEDULED
Status: DISCONTINUED | OUTPATIENT
Start: 2017-07-13 | End: 2017-07-13

## 2017-07-13 RX ORDER — ATENOLOL 50 MG/1
50 TABLET ORAL 2 TIMES DAILY
Status: DISCONTINUED | OUTPATIENT
Start: 2017-07-13 | End: 2017-07-14

## 2017-07-13 RX ADMIN — MICONAZOLE NITRATE: 2 POWDER TOPICAL at 20:02

## 2017-07-13 RX ADMIN — MIDODRINE HYDROCHLORIDE 10 MG: 5 TABLET ORAL at 09:53

## 2017-07-13 RX ADMIN — PREDNISONE 7.5 MG: 5 TABLET ORAL at 09:53

## 2017-07-13 RX ADMIN — FUROSEMIDE 40 MG: 10 INJECTION, SOLUTION INTRAVENOUS at 16:40

## 2017-07-13 RX ADMIN — POTASSIUM CHLORIDE 40 MEQ: 750 TABLET, EXTENDED RELEASE ORAL at 09:53

## 2017-07-13 RX ADMIN — GABAPENTIN 200 MG: 100 CAPSULE ORAL at 20:01

## 2017-07-13 RX ADMIN — MIDODRINE HYDROCHLORIDE 10 MG: 5 TABLET ORAL at 13:58

## 2017-07-13 RX ADMIN — IOPAMIDOL 81 ML: 755 INJECTION, SOLUTION INTRAVENOUS at 15:59

## 2017-07-13 RX ADMIN — FUROSEMIDE 40 MG: 10 INJECTION, SOLUTION INTRAVENOUS at 09:54

## 2017-07-13 RX ADMIN — NAPROXEN 250 MG: 250 TABLET ORAL at 09:53

## 2017-07-13 RX ADMIN — Medication 2 G: at 12:00

## 2017-07-13 RX ADMIN — DIGOXIN 125 MCG: 125 TABLET ORAL at 09:54

## 2017-07-13 RX ADMIN — NAPROXEN 250 MG: 250 TABLET ORAL at 18:47

## 2017-07-13 RX ADMIN — ACETAMINOPHEN 650 MG: 325 TABLET, FILM COATED ORAL at 13:58

## 2017-07-13 RX ADMIN — MIDODRINE HYDROCHLORIDE 10 MG: 5 TABLET ORAL at 18:47

## 2017-07-13 RX ADMIN — GABAPENTIN 200 MG: 100 CAPSULE ORAL at 13:58

## 2017-07-13 RX ADMIN — ACETAMINOPHEN 650 MG: 325 TABLET, FILM COATED ORAL at 21:59

## 2017-07-13 RX ADMIN — PANTOPRAZOLE SODIUM 40 MG: 40 TABLET, DELAYED RELEASE ORAL at 06:32

## 2017-07-13 RX ADMIN — GABAPENTIN 200 MG: 100 CAPSULE ORAL at 09:53

## 2017-07-13 RX ADMIN — MICONAZOLE NITRATE: 2 POWDER TOPICAL at 08:00

## 2017-07-13 RX ADMIN — POTASSIUM CHLORIDE 20 MEQ: 750 TABLET, EXTENDED RELEASE ORAL at 13:58

## 2017-07-13 RX ADMIN — ATENOLOL 50 MG: 50 TABLET ORAL at 20:01

## 2017-07-13 RX ADMIN — CALCIUM CARBONATE 600 MG (1,500 MG)-VITAMIN D3 400 UNIT TABLET 2 TABLET: at 09:54

## 2017-07-13 RX ADMIN — ACETAMINOPHEN 650 MG: 325 TABLET, FILM COATED ORAL at 06:32

## 2017-07-13 RX ADMIN — MULTIPLE VITAMINS W/ MINERALS TAB 1 TABLET: TAB at 09:52

## 2017-07-13 NOTE — PROGRESS NOTES
Social Work Services Progress Note    Hospital Day: 25  Date of Initial Social Work Evaluation:  6/20/17  Collaborated with:  Pt and Spouse    Data:  Pt is a 56 year old female who was transferred from [facility].  SW following for placement back at [facility] as able.    Intervention:  Spouse Romeo asking for assistance to complete pt's disability forms while hospitalized.  Cards 1 fellow completed with SW.  SW will fax on to [facility] and will give a copy to spouse to retain for records.  SW to check in with family regularly as pt's medical work up continues.    Assessment:  No change, spouse continues to provide daily cares at bedside.  Pt and family aware of how to contact SW for needs.    Plan:    Anticipated Disposition:  Facility:  Return to [facility] as able.    Barriers to d/c plan:  Medical stability, pt continues to have fevers and required continued medical testing to determine the source of the fevers.  Bone marrow biopsy results pending to guide care plans.    Follow Up:  SW follow for discharge planning.    DENNIS Larsen, APSW  6C Unit   Phone: [phone]  Pager: [phone]  Unit: [phone]      ___________________________________________________________________________________________________________________________________________________    Referrals in process:   [facility] - return when medically stable.    Referrals Discontinued:  None    Community Case Management/Community Services in place:   None

## 2017-07-13 NOTE — PROGRESS NOTES
SPIRITUAL HEALTH SERVICES  SPIRITUAL ASSESSMENT Progress Note  [facility] 6C     REFERRAL SOURCE: Follow up    Chatted with patient briefly as she was wheeled to CT apt.    PLAN: Will follow up tomorrow or next week, continue to visit 1-2x/wk during length of stay    Caitie Colón   Intern  Pager [phone]

## 2017-07-13 NOTE — PLAN OF CARE
Problem: Goal Outcome Summary  Goal: Goal Outcome Summary  1. Pt will be free of fever/chills   PT/CR/6C     Pt ambulated 110'x2 with FWW, w/c follow, and SBA. Pt on RA during session today, reports desire to wean off supplemental O2, SpO2 90-96% throughout session. Pt requires mod A for sit>supine at B LEs. STS w/ SBA for minimal VCs and safety w/ balance.     Recommend discharge to ARU to continue working on functional independence, strength, and activity tolerance.

## 2017-07-13 NOTE — PROGRESS NOTES
Cardiology Daily Note   Date of Service: 7/9/2017  Patient: Amelia Michel  MRN: [id number]  Admission Date: 6/19/2017  Hospital Day # 24    Assessment & Plan:   Amelia Michel is a 56 year old female with PMHx of VSD s/p repair (1969), RA, recently diagnosed atrial fibrillation that was complicated with an OOH cardiac arrest (felt 2/2 long pause with degeneration to VFib while converting Afib to SR with multiple AV prieto agents) who was readmitted from ARU on 6/19 with fever and concern for worsening LUE infection. She is now transferred back to cardiology service as primary on 6/26 for further workup and evaluation of recurrent lactic acidosis, persistent afib and softer blood pressures with tachypnea.     # Volume overload/anasarca:  # HFrEF (EF 50-55%):  # Hypotension:  Patient initially up 35# at admission; diuresis initially challenging, secondary to third spacing of fluid (albumin 1.9), but briskly responded with addition of midodrine and ongoing albumin resuscitation. Right heart cath 6/26 with mildly elevated right and left sided filling presures  - Gentle diuresis with intermittent furosemide, 80 mg total 7/13/17  - Increased midodrine to 10 mg TID     # Atrial fibrillation/flutter with RVR:  RVR in setting of volume overload above. Patient rec'd single dose of diltiazem with associated soft BPs.   - Continue dig 125mcg  - Atenolol 50 mg BID  - Not anticoagulated due to thrombocytopenia    # Out of hospital arrest:  Pt will need to wear LifeVest until medically appropriate and cleared from infection/wound standpoint for ICD implantation likely 4-6 weeks after discharge per EP (pending hospital course for LUE wound)    # Fever of unknown origin  Previously attributes to LUE wound, though patient has continued to have fevers despite broad spectrum antibiotics. ID consulted and feels that unlikely secondary to infection. Discontinued antibiotics with close monitoring. Patient without DVT, no  indolent nidus of infection noted on MRI spine, LUE wound continues to improve without antibiotics.   - Patient neutropenic, not to degree to warrant antibiotics for fever at present  - Heme/onc following, appreciate recs  - Bmbx results from 7/12/17 pending  - Will consider BRE and PET pending results from bmbx  - CT chest 7/13/17      # Acute on Chronic Thrombocytopenia:   2/2 hypoproliferative bone marrow. Plts noted to decrease from 104 to as low as 15 during recent hospitalization requiring transfusion w/ chronically low in the 's as outpatient. Possible bactrim is offending agent for exacerbation, and discontinued this medication in the setting of worsening cellulitis.   - If this is chronic ITP, no need for treatment unless plts drift < 10s if not bleeding  - Continue to hold RA meds for now  - D/C apixiban (last dose 7/8/17)    # Acute on chronic anemia: Stable  No evidence of overt bleeding.   - Daily CBC    # RA:   History of seropositive rheumatoid arthritis (anti-CCP positive) since late 1980;s w/ multiple MTP osteotomies, partial right wrist fusion, longstanding therapy consisting of MTX, prednisone and HCQ  - Continue with Prednisone taper. Now on 7.5 mg on 7/1 then to 5 mg daily after 2 weeks if continues to have low disease activity  - Continue to hold HCQ and MTX until outpatient follow-up  - Follow-up with [facility] Dr. Conor Cunha in 4-6 weeks after discharge      # Severe Critical Illness Myopathy:   Significant deconditioning w/ median neuropathies most likely localized to the wrists and ulnar neuropathies most likely localized to the elbows secondary to RA.  - Ongoing extensive PT/OT/SLP    Consulting Services: Nutrition, heme/onc    CODE: Full Code  DVT Ppx: Apixaban resumed 6/28  Diet/Fluids: As above.  Disposition: Pending improvement in above.    Pt's care was discussed with bedside RN and patient during Care Team Rounds.    Patient was discussed and evaluated with  "Dr. Mclain.    Kelly Smith  Internal Medicine PGY2  [phone]    ___________________________________________________________________    Subjective & Interval History:     Last 24 hr care team notes reviewed. Patient afebrile for the past 24 hours. Breathing stable. No joint pain. No rashes. No changes in swelling from yesterday, continues to feel more swelling than baseline.     4 point ROS otherwise negative.    Medications: Reviewed in EPIC. List below for reference    Physical Exam:    Blood pressure 95/66, pulse 68, temperature 97.8  F (36.6  C), temperature source Oral, resp. rate 20, height 1.48 m (4' 10.25\"), weight 75.1 kg (165 lb 9.6 oz), SpO2 100 %, not currently breastfeeding.    CONSTITUTIONAL:  Ms. Michel is sitting up in chair in no apparent distress.  HEENT: MMM. EOMI. +JVD to midway to angle of the jaw.  LUNGS: Normal respiratory effort without increased work of breathing. No wheezes appreciated. Diminished in bilateral bases.   CARDIOVASCULAR: Irregularly irregular, tachycardic w/ no M/R/G.   ABDOMEN: Soft, nondistended, NT, BS +ve.   MUSCULOSKELETAL: Moves all four extremities without difficulty. Diffuse anasarca.  NEURO: A&Ox3, CN II-XII grossly intact, non-focal.     Labs & Studies of Note: Reviewed in Epic  CMP    Recent Labs  Lab 07/13/17  0744 07/12/17  0641 07/11/17  0824 07/10/17  0546  07/09/17  0758    134 136 136  < > 136   POTASSIUM 4.0 4.0 3.8 3.8  < > 3.7   CHLORIDE 102 99 101 100  < > 100   CO2 27 25 29 29  < > 28   ANIONGAP 8 10 6 7  < > 8   * 103* 94 92  < > 98   BUN 36* 28 24 24  < > 26   CR 0.80 0.67 0.66 0.59  < > 0.62   GFRESTIMATED 74 >90Non  GFR Calc >90Non  GFR Calc >90Non  GFR Calc  < > >90Non  GFR Calc   GFRESTBLACK 90 >90African American GFR Calc >90African American GFR Calc >90African American GFR Calc  < > >90African American GFR Calc   VIKTORIYA 8.5 8.2* 8.4* 8.1*  < > 8.3*   MAG 2.3 1.9 1.7  --   " --  2.0   PHOS  --   --  3.1  --   --  2.7   < > = values in this interval not displayed.    CBC    Recent Labs  Lab 07/12/17  0641 07/10/17  0546 07/08/17  1308 07/07/17  0715   WBC 2.1* 1.6* 2.6* 1.4*   RBC 2.87* 2.44* 2.86* 2.41*   HGB 8.9* 7.5* 8.7* 7.4*   HCT 27.1* 23.0* 26.5* 22.5*   PLT 51* 43* 45* 27*     INR    Recent Labs  Lab 07/10/17  0546 07/08/17  0914   INR 1.43* 1.68*     Medication List for Reference (delete if desired)  Current Facility-Administered Medications   Medication     furosemide (LASIX) injection 40 mg     diltiazem (CARDIZEM) tablet 30 mg     midodrine (PROAMATINE) tablet 10 mg     naproxen (NAPROSYN) tablet 250 mg     acetaminophen (TYLENOL) tablet 650 mg     potassium chloride SA (K-DUR/KLOR-CON M) CR tablet 40 mEq     miconazole (MICATIN; MICRO GUARD) 2 % powder     predniSONE (DELTASONE) tablet 7.5 mg     calcium carbonate (TUMS) chewable tablet 500 mg     pantoprazole (PROTONIX) EC tablet 40 mg     digoxin (LANOXIN) tablet 125 mcg     ondansetron (ZOFRAN) tablet 4 mg     prochlorperazine (COMPAZINE) injection 5-10 mg     ondansetron (ZOFRAN) injection 4 mg     simethicone (MYLICON) chewable tablet 80 mg     sodium chloride (PF) 0.9% PF flush 10-20 mL     sodium chloride (PF) 0.9% PF flush 10 mL     heparin lock flush 10 UNIT/ML injection 5-10 mL     dextrose 10 % 1,000 mL infusion     gabapentin (NEURONTIN) capsule 200 mg     melatonin tablet 3 mg     morphine 0.1% in solosite topical gel 2 g     multivitamin, therapeutic with minerals (THERA-VIT-M) tablet 1 tablet     triamcinolone (KENALOG) 0.1 % ointment     lidocaine 1 % 1 mL     lidocaine (LMX4) kit     sodium chloride (PF) 0.9% PF flush 3 mL     sodium chloride (PF) 0.9% PF flush 3 mL     medication instruction     acetaminophen (TYLENOL) Suppository 650 mg     Patient is already receiving anticoagulation with heparin, enoxaparin (LOVENOX), warfarin (COUMADIN)  or other anticoagulant medication     glucose 40 % gel 15-30 g     Or     dextrose 50 % injection 25-50 mL    Or     glucagon injection 1 mg     naloxone (NARCAN) injection 0.1-0.4 mg     calcium-vitamin D (CALTRATE) 600-400 MG-UNIT per tablet 2 tablet     morphine 0.1% in solosite topical gel     potassium chloride SA (K-DUR/KLOR-CON M) CR tablet 20-40 mEq     potassium chloride (KLOR-CON) Packet 20-40 mEq     potassium chloride 10 mEq in 100 mL intermittent infusion     potassium chloride 10 mEq in 100 mL intermittent infusion with 10 mg lidocaine     potassium chloride 20 mEq in 50 mL intermittent infusion     magnesium sulfate 2 g in NS intermittent infusion (PharMEDium or FV Cmpd)     magnesium sulfate 4 g in 100 mL sterile water (premade)     sodium phosphate 10 mmol in D5W intermittent infusion     sodium phosphate 15 mmol in D5W intermittent infusion     sodium phosphate 20 mmol in D5W intermittent infusion     sodium phosphate 25 mmol in D5W intermittent infusion     melatonin tablet 1 mg     traMADol (ULTRAM) half-tab 25 mg

## 2017-07-13 NOTE — CONSULTS
[facility] Rheumatology Consultation    Amelia Michel MRN# [id number]   Age: 56 year old YOB: 1960     Date of Admission:  6/19/2017    Reason for consult: RA with daily fevers, question is whether RA or new diagnosis (?AOSD) as the etiology.        Requesting physician: Dr. Kelly Smith                   Impression and Recommendation:   Impression: Amelia Michel is a 56 year old female with history of RA, afib/flutter and recent cardiac arrest with multiple complications admitted with left arm wound and cellulitis who has now been experiencing almost daily fevers despite tylenol use. Rheumatology consulted for possible autoimmune etiology given that ID does not suspect ongoing infection. Based on clinical history and exam, she does not have active rheumatoid arthritis at this time. Fevers to this magnitude are rare even in active disease. An alternative autoimmune process is also unlikely, including Adult-onset Stills disease (AOSD). While she did have an isolated rash during her prior hospitalization, thought to be drug reaction, she has not had any rashes with her current fevers. 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 This does not appear to be what this patient has either.    While it is unclear where exactly her fevers are originating from, we do not suspect autoimmune to be the etiology. Would favor hematologic etiology versus infectious, though infectious seems less likely as well given continued fevers despite adequate broad spectrum coverage (unless there is a problem with source control). We will await bone marrow biopsy results as this may yield an answer for her fevers of unknown origin. No changes to her RA medications are required at this time.     Problem list:  1. Seropositive RA (+RF, CCP) without active disease  2. +CELESTE (weakly positive, 1:40)  3. Pancytopenia   4. Fever  of unknown origin    Recommendations:  -- Unlikely that fevers are due to autoimmune process  -- Follow up on bone marrow biopsy results  -- Continue prednisone 7.5 mg daily; can hold MTX, HCQ until follow up.  --  to bring in prior rheumatologic records with prior surveillance labs  -- May need to reschedule follow up with Dr. Cunha depending on when patient discharges from hospital.   -- Consider discussing case with ID again   -- Rheumatology will continue to follow along.    The patient was seen and staffed with Dr. Nagi MD.     ATTENDING TIE IN NOTE:    I saw the patient with the resident and Rheumatology Fellow.  My exam and recommendations are as described in this edited note.      Melquiades Lazar MD FACP    Rheumatic and Autoimmune Diseases         Chief Complaint:   Fevers  History is obtained from the patient         History of Present Illness:   This patient is a 56 year old  female with a significant past medical history of seropositive RA (CCP), afib/flutter, VSD repair in 1960s who was admitted to hospital after out of hospital cardiac arrest in May 2017. ROSC was obtained in field. Etiology behind arrest remains unclear, she had clean coronary arteries on angiogram. She had prolonged hospitalization from 5/29 to 6/15 complicated by bone marrow suppression, hypoxic respiratory failure requiring mechanical ventilation, CELSA, anoxic brain injury, ESBL UTI, and aspiration PNA. She also had extravasation of amiodarone during arrest that caused rash of left antecubital fossa which did not allow implantation of ICD at the time. She was discharged to a rehab facility only to return on 6/18 with fevers, left upper extremity wound, concerning for cellulitis and/or myositis. Wound grew enterobacter, CoNS, and enterococcus. She was started on bactrim, then switched to ertapenem. She also had courses of meropenem and vancomycin during this time.     Since admission she has  continued to have spiking fevers to 100-102F despite antibiotic therapy. Per patient, she is feeling well other than these continued fevers. She reports that since everything occurred surrounding her cardiac arrest and its complications, she has been gradually improving. She has daily fevers but no associated rashes, arthritis/arthralgias, sore throat. She does report myalgias after working with PT. ROS was unremarkable except for a unknown lesion on her scalp, dry mouth, leg edema (chronic), and the left arm wound.     Regarding her RA, this has been followed by Dr. Dumas in [place], [place] for many years. She has not had active disease for a period of years and she has had routine surveillance of her RA with labs every 3 months since she can remember. She denies ever being told that she had toxicity from her medications. Prior to her cardiac arrest, she was on methotrexate 10 mg PO/week, Arava 10 mg daily,  mg daily, and prednisone 3 mg daily. She has history of positive RF in 2016, negative on subsequent check in 2017 and recently had positive CCP. Previously also had mildly positive CELESTE (1:40).          Past Medical History:     Past Medical History:   Diagnosis Date     Hypertension      Rheumatoid arthritis(714.0)           Past Surgical History:     Past Surgical History:   Procedure Laterality Date     ANESTHESIA CARDIOVERSION N/A 5/17/2017    Procedure: ANESTHESIA CARDIOVERSION;  ANESTHESIA CARDIOVERSION;  Surgeon: GENERIC ANESTHESIA PROVIDER;  Location: [facility] OR     ARTHRODESIS WRIST  2000    Right wrist     BONE MARROW ASPIRATE &BIOPSY  7/12/2017          FOOT SURGERY      4 left and 2 right     RELEASE CARPAL TUNNEL BILATERAL       REPAIR VENTRICULAR SEPTAL DEFECT  1969            Social History:   , no kids. No tobacco, drugs. Occasional wine.           Family History:   Hx of breast cancer, lymphoma, HTN.           Immunizations:     Most Recent Immunizations   Administered Date(s)  Administered     Pneumococcal 23 valent 08/31/2011     TDAP Vaccine (Adacel) 02/08/2011             Allergies:     Allergies   Allergen Reactions     No Clinical Screening - See Comments      Penicillin Allergy Skin Test not performed, see antimicrobial management team progress note 7/5/17.       Penicillins      Tape [Adhesive Tape] Rash             Medications:     Prescriptions Prior to Admission   Medication Sig Dispense Refill Last Dose     ertapenem (INVANZ) 1 GM vial Inject 1 g into the vein every 24 hours 10 each  Unknown at Unknown time     insulin aspart (NOVOLOG PEN) 100 UNIT/ML injection Inject 1-7 Units Subcutaneous 3 times daily (before meals)   Unknown at Unknown time     insulin aspart (NOVOLOG PEN) 100 UNIT/ML injection Inject 1-5 Units Subcutaneous At Bedtime   Unknown at Unknown time     melatonin 3 MG tablet Take 1 tablet (3 mg) by mouth nightly as needed for sleep   Unknown at Unknown time     melatonin 1 MG TABS tablet Take 1 tablet (1 mg) by mouth At Bedtime   Unknown at Unknown time     morphine 0.1% in solosite topical gel Place 2 g onto the skin 3 times daily  0 Unknown at Unknown time     multivitamin, therapeutic with minerals (THERA-VIT-M) TABS tablet Take 1 tablet by mouth daily 30 each 0 Unknown at Unknown time     [START ON 7/15/2017] predniSONE (DELTASONE) 5 MG tablet Take 1 tablet (5 mg) by mouth daily   Unknown at Unknown time     predniSONE (DELTASONE) 10 MG tablet Take 1 tablet (10 mg) by mouth daily (Patient taking differently: Take 10 mg by mouth daily Take 10mg daily, last dose 6/28)   Unknown at Unknown time     predniSONE (DELTASONE) 2.5 MG tablet Take 3 tablets (7.5 mg) by mouth daily (Patient taking differently: Take 7.5 mg by mouth daily Start 6/29 for 2 weeks)   Unknown at Unknown time     vancomycin 1,500 mg Inject 1,500 mg into the vein every 12 hours   Unknown at Unknown time     zinc sulfate (ZINCATE) 220 (50 ZN) MG capsule Take 1 capsule (220 mg) by mouth daily    Unknown at Unknown time     ascorbic acid 500 MG TABS Take 1 tablet (500 mg) by mouth daily 30 tablet  Unknown at Unknown time     beta carotene 59071 UNIT capsule Take 1 capsule (25,000 Units) by mouth daily   Unknown at Unknown time     acetaminophen (TYLENOL) 325 MG tablet Take 2 tablets (650 mg) by mouth every 4 hours as needed for mild pain 100 tablet  Unknown at Unknown time     apixaban ANTICOAGULANT (ELIQUIS) 2.5 MG tablet Take 1 tablet (2.5 mg) by mouth 2 times daily   Unknown at Unknown time     cetirizine (ZYRTEC) 10 MG tablet Take 1 tablet (10 mg) by mouth daily 30 tablet  Unknown at Unknown time     atenolol (TENORMIN) 50 MG tablet Take 1 tablet (50 mg) by mouth daily 30 tablet  Unknown at Unknown time     digoxin (LANOXIN) 250 MCG tablet Take 1 tablet (250 mcg) by mouth daily 30 tablet  Unknown at Unknown time     triamcinolone (KENALOG) 0.1 % ointment Apply topically 2 times daily   Unknown at Unknown time     senna-docusate (SENOKOT-S;PERICOLACE) 8.6-50 MG per tablet Take 1-2 tablets by mouth 2 times daily 100 tablet  Unknown at Unknown time     Calcium Carb-Cholecalciferol 600-800 MG-UNIT TABS Take 2 tablets by mouth every morning   Unknown at Unknown time     Coenzyme Q10 (COQ-10 PO) Take 1 tablet by mouth daily In the morning   Unknown at Unknown time     gabapentin (NEURONTIN) 100 MG capsule Take 2 capsules (200 mg) by mouth 3 times daily (Patient taking differently: Take 200 mg by mouth Take 2 capsules (200 mg) by mouth every 6 hours (4 am, 10am, 4pm, 10pm)) 180 capsule 3 Unknown at Unknown time     folic acid (FOLVITE) 1 MG tablet Take 1 mg by mouth daily.   Unknown at Unknown time       Current Facility-Administered Medications   Medication     furosemide (LASIX) injection 40 mg     diltiazem (CARDIZEM) tablet 30 mg     midodrine (PROAMATINE) tablet 10 mg     naproxen (NAPROSYN) tablet 250 mg     acetaminophen (TYLENOL) tablet 650 mg     potassium chloride SA (K-DUR/KLOR-CON M) CR tablet 40  mEq     miconazole (MICATIN; MICRO GUARD) 2 % powder     predniSONE (DELTASONE) tablet 7.5 mg     calcium carbonate (TUMS) chewable tablet 500 mg     pantoprazole (PROTONIX) EC tablet 40 mg     digoxin (LANOXIN) tablet 125 mcg     ondansetron (ZOFRAN) tablet 4 mg     prochlorperazine (COMPAZINE) injection 5-10 mg     ondansetron (ZOFRAN) injection 4 mg     simethicone (MYLICON) chewable tablet 80 mg     sodium chloride (PF) 0.9% PF flush 10-20 mL     sodium chloride (PF) 0.9% PF flush 10 mL     heparin lock flush 10 UNIT/ML injection 5-10 mL     dextrose 10 % 1,000 mL infusion     gabapentin (NEURONTIN) capsule 200 mg     melatonin tablet 3 mg     morphine 0.1% in solosite topical gel 2 g     multivitamin, therapeutic with minerals (THERA-VIT-M) tablet 1 tablet     triamcinolone (KENALOG) 0.1 % ointment     lidocaine 1 % 1 mL     lidocaine (LMX4) kit     sodium chloride (PF) 0.9% PF flush 3 mL     sodium chloride (PF) 0.9% PF flush 3 mL     medication instruction     acetaminophen (TYLENOL) Suppository 650 mg     Patient is already receiving anticoagulation with heparin, enoxaparin (LOVENOX), warfarin (COUMADIN)  or other anticoagulant medication     glucose 40 % gel 15-30 g    Or     dextrose 50 % injection 25-50 mL    Or     glucagon injection 1 mg     naloxone (NARCAN) injection 0.1-0.4 mg     calcium-vitamin D (CALTRATE) 600-400 MG-UNIT per tablet 2 tablet     morphine 0.1% in solosite topical gel     potassium chloride SA (K-DUR/KLOR-CON M) CR tablet 20-40 mEq     potassium chloride (KLOR-CON) Packet 20-40 mEq     potassium chloride 10 mEq in 100 mL intermittent infusion     potassium chloride 10 mEq in 100 mL intermittent infusion with 10 mg lidocaine     potassium chloride 20 mEq in 50 mL intermittent infusion     magnesium sulfate 2 g in NS intermittent infusion (PharMEDium or FV Cmpd)     magnesium sulfate 4 g in 100 mL sterile water (premade)     sodium phosphate 10 mmol in D5W intermittent infusion      "sodium phosphate 15 mmol in D5W intermittent infusion     sodium phosphate 20 mmol in D5W intermittent infusion     sodium phosphate 25 mmol in D5W intermittent infusion     melatonin tablet 1 mg     traMADol (ULTRAM) half-tab 25 mg            Review of Systems:   Complete 10 point ROS completed and negative unless mentioned in HPI.          Physical Exam:   Vitals were reviewed  BP 95/66 (BP Location: Right arm)  Pulse 68  Temp 97.8  F (36.6  C) (Oral)  Resp 20  Ht 1.48 m (4' 10.25\")  Wt 75.1 kg (165 lb 9.6 oz)  SpO2 100%  BMI 34.31 kg/m2    General: appears well, no acute distress,  at bedside  HEENT: NC/AT. EOMI, white sclera, pupils equal, op clear.   Lymph: no cervical lymphadenopathy  CV: irregularly irregular rhythm with normal rate, no m/r/g.   Lung: ctab, normal effort  Abdomen: soft, nontender, nondistended, bowel sounds present  Neuro: AOx3. Strength 5/5 x4. Normal speech.   Skin/Hair: dime sized dark brown lesion on posterior left scalp. No rashes seen on chest, arms, back.  Ext: wwp, lower extremities with thigh high compression stockings, 1+ pitting edema. Left upper extremity wound in antecubital fossa with dressing in place.   Msk: Right wrist fused, limited extension/flexion ROM but no TTP. Left wrist with mostly normal ROM. Mild TTP of 1st-3rd MCPs, PIPs on left hand without active synovitis. Ulnar deviation present bilaterally, left worse than right. Evidence of synovial thickening in most MCP joints bilaterally. Examined bilateral shoulders, elbows, knees, ankles - all joints with normal appearance, no TTP, warmth, erythema and had normal ROM.           Data:   Labs and imaging reviewed. Pertinent findings below.  CBC with pancytopenia (WBC 2.1, ANC 1200, Hgb 8.9, PLT 51)  BMP unremarkable\  CRP 95  Lactate 3.2    Prior autoimmune workup:  CELESTE 1:40, positive CCP, negative RF  Negative SSA/SSB, anti-smith, dsDNA, scl-70, RNP, C3/C4 (normal), LAC, and B2 glycoproteins    CT scan of " abdomen/pelvis 7/1/17  - possible splenic infarct  - anasarca, bilateral pleural effusions  - no hepatosplenomegaly    Ruben Shelley MD  Internal medicine PGY-3  Pager [phone]

## 2017-07-13 NOTE — PLAN OF CARE
Problem: Sepsis (Adult)  Goal: Signs and Symptoms of Listed Potential Problems Will be Absent or Manageable (Sepsis)  Signs and symptoms of listed potential problems will be absent or manageable by discharge/transition of care (reference Sepsis (Adult) CPG).   Outcome: No Change  D/I: Monitor shows A-fib/flutter  110s. Up to 120s with activity. Increased 1-1.5 second pauses since mid-morning. Did not receive any diltiazem or atenolol. Afebrile. Receiving scheduled tylenol every 8 hours. Up in chair for 4 hours this AM. Comfortable while in bed. O2 on and off for comfort. Continued urinary incontinence. Seen by dermatology and rheumatology for fever w/u. To have chest CT.  present in AM. See flowsheets for assessments and additional data.  A: No overt appearance of sepsis. Continued a-fib 110 with occasional decrease in rate.    P: Monitor temps and symptoms. Encourage increased activity and participation in cares. Continue current cares and notify providers with questions or concerns.    07/13/17 1530   Sepsis   Problems Assessed (Sepsis) all   Problems Present (Sepsis) none;situational response

## 2017-07-13 NOTE — PLAN OF CARE
Problem: Goal Outcome Summary  Goal: Goal Outcome Summary  1. Pt will be free of fever/chills   Patient admitted 6/19 from rehab for LUE wound pain and fevers. VSS, denies pain. A fib, HR 60s-120's. Soft BP's with MAPs over 65. No temp noted this shift. Left arm amiodarone extravasation wound changed daily, see wound care orders. Bone marrow biopsy site on L iliac crest CDI. Incontinent, brief changed x2. Continue to assess and monitor, notify Cards 1 with concerns.

## 2017-07-14 ENCOUNTER — APPOINTMENT (OUTPATIENT)
Dept: OCCUPATIONAL THERAPY | Facility: CLINIC | Age: 57
DRG: 871 | End: 2017-07-14
Attending: INTERNAL MEDICINE
Payer: COMMERCIAL

## 2017-07-14 ENCOUNTER — APPOINTMENT (OUTPATIENT)
Dept: CARDIOLOGY | Facility: CLINIC | Age: 57
DRG: 871 | End: 2017-07-14
Attending: INTERNAL MEDICINE
Payer: COMMERCIAL

## 2017-07-14 LAB
ALBUMIN SERPL-MCNC: 2.5 G/DL (ref 3.4–5)
ANION GAP SERPL CALCULATED.3IONS-SCNC: 9 MMOL/L (ref 3–14)
BASOPHILS # BLD AUTO: 0 10E9/L (ref 0–0.2)
BASOPHILS NFR BLD AUTO: 0.5 %
BUN SERPL-MCNC: 40 MG/DL (ref 7–30)
CALCIUM SERPL-MCNC: 8.6 MG/DL (ref 8.5–10.1)
CHLORIDE SERPL-SCNC: 101 MMOL/L (ref 94–109)
CO2 SERPL-SCNC: 26 MMOL/L (ref 20–32)
CREAT SERPL-MCNC: 1.09 MG/DL (ref 0.52–1.04)
CRP SERPL-MCNC: 83 MG/L (ref 0–8)
DIFFERENTIAL METHOD BLD: ABNORMAL
EOSINOPHIL # BLD AUTO: 0 10E9/L (ref 0–0.7)
EOSINOPHIL NFR BLD AUTO: 0.5 %
ERYTHROCYTE [DISTWIDTH] IN BLOOD BY AUTOMATED COUNT: 25 % (ref 10–15)
GFR SERPL CREATININE-BSD FRML MDRD: 52 ML/MIN/1.7M2
GLUCOSE SERPL-MCNC: 92 MG/DL (ref 70–99)
HCT VFR BLD AUTO: 24 % (ref 35–47)
HGB BLD-MCNC: 7.8 G/DL (ref 11.7–15.7)
IMM GRANULOCYTES # BLD: 0 10E9/L (ref 0–0.4)
IMM GRANULOCYTES NFR BLD: 0.5 %
LYMPHOCYTES # BLD AUTO: 0.5 10E9/L (ref 0.8–5.3)
LYMPHOCYTES NFR BLD AUTO: 20.3 %
MCH RBC QN AUTO: 30.8 PG (ref 26.5–33)
MCHC RBC AUTO-ENTMCNC: 32.5 G/DL (ref 31.5–36.5)
MCV RBC AUTO: 95 FL (ref 78–100)
MONOCYTES # BLD AUTO: 0.4 10E9/L (ref 0–1.3)
MONOCYTES NFR BLD AUTO: 18.9 %
NEUTROPHILS # BLD AUTO: 1.3 10E9/L (ref 1.6–8.3)
NEUTROPHILS NFR BLD AUTO: 59.3 %
NRBC # BLD AUTO: 0 10*3/UL
NRBC BLD AUTO-RTO: 0 /100
PLATELET # BLD AUTO: 39 10E9/L (ref 150–450)
PLATELET # BLD EST: ABNORMAL 10*3/UL
POTASSIUM SERPL-SCNC: 4.3 MMOL/L (ref 3.4–5.3)
PROCALCITONIN SERPL-MCNC: 2.33 NG/ML
RBC # BLD AUTO: 2.53 10E12/L (ref 3.8–5.2)
SODIUM SERPL-SCNC: 136 MMOL/L (ref 133–144)
WBC # BLD AUTO: 2.2 10E9/L (ref 4–11)

## 2017-07-14 PROCEDURE — 25000132 ZZH RX MED GY IP 250 OP 250 PS 637: Performed by: NURSE PRACTITIONER

## 2017-07-14 PROCEDURE — 25000132 ZZH RX MED GY IP 250 OP 250 PS 637

## 2017-07-14 PROCEDURE — 93325 DOPPLER ECHO COLOR FLOW MAPG: CPT | Mod: 26 | Performed by: INTERNAL MEDICINE

## 2017-07-14 PROCEDURE — 84145 PROCALCITONIN (PCT): CPT | Performed by: INTERNAL MEDICINE

## 2017-07-14 PROCEDURE — 40000133 ZZH STATISTIC OT WARD VISIT: Performed by: OCCUPATIONAL THERAPIST

## 2017-07-14 PROCEDURE — 36415 COLL VENOUS BLD VENIPUNCTURE: CPT | Performed by: INTERNAL MEDICINE

## 2017-07-14 PROCEDURE — 99152 MOD SED SAME PHYS/QHP 5/>YRS: CPT

## 2017-07-14 PROCEDURE — 82040 ASSAY OF SERUM ALBUMIN: CPT | Performed by: INTERNAL MEDICINE

## 2017-07-14 PROCEDURE — 85025 COMPLETE CBC W/AUTO DIFF WBC: CPT | Performed by: INTERNAL MEDICINE

## 2017-07-14 PROCEDURE — 99232 SBSQ HOSP IP/OBS MODERATE 35: CPT | Mod: GC | Performed by: INTERNAL MEDICINE

## 2017-07-14 PROCEDURE — 25000128 H RX IP 250 OP 636: Performed by: INTERNAL MEDICINE

## 2017-07-14 PROCEDURE — 86140 C-REACTIVE PROTEIN: CPT | Performed by: INTERNAL MEDICINE

## 2017-07-14 PROCEDURE — 25000131 ZZH RX MED GY IP 250 OP 636 PS 637: Performed by: STUDENT IN AN ORGANIZED HEALTH CARE EDUCATION/TRAINING PROGRAM

## 2017-07-14 PROCEDURE — 93320 DOPPLER ECHO COMPLETE: CPT

## 2017-07-14 PROCEDURE — 99153 MOD SED SAME PHYS/QHP EA: CPT

## 2017-07-14 PROCEDURE — 25000125 ZZHC RX 250: Performed by: INTERNAL MEDICINE

## 2017-07-14 PROCEDURE — 97535 SELF CARE MNGMENT TRAINING: CPT | Mod: GO | Performed by: OCCUPATIONAL THERAPIST

## 2017-07-14 PROCEDURE — 40000133 ZZH STATISTIC OT WARD VISIT

## 2017-07-14 PROCEDURE — 25000132 ZZH RX MED GY IP 250 OP 250 PS 637: Performed by: INTERNAL MEDICINE

## 2017-07-14 PROCEDURE — 93312 ECHO TRANSESOPHAGEAL: CPT | Mod: 26 | Performed by: INTERNAL MEDICINE

## 2017-07-14 PROCEDURE — 21400006 ZZH R&B CCU INTERMEDIATE UMMC

## 2017-07-14 PROCEDURE — 80048 BASIC METABOLIC PNL TOTAL CA: CPT | Performed by: INTERNAL MEDICINE

## 2017-07-14 PROCEDURE — 97535 SELF CARE MNGMENT TRAINING: CPT | Mod: GO

## 2017-07-14 PROCEDURE — 25000132 ZZH RX MED GY IP 250 OP 250 PS 637: Performed by: STUDENT IN AN ORGANIZED HEALTH CARE EDUCATION/TRAINING PROGRAM

## 2017-07-14 PROCEDURE — 25000125 ZZHC RX 250: Performed by: STUDENT IN AN ORGANIZED HEALTH CARE EDUCATION/TRAINING PROGRAM

## 2017-07-14 PROCEDURE — 93320 DOPPLER ECHO COMPLETE: CPT | Mod: 26 | Performed by: INTERNAL MEDICINE

## 2017-07-14 RX ORDER — FENTANYL CITRATE 50 UG/ML
50-100 INJECTION, SOLUTION INTRAMUSCULAR; INTRAVENOUS
Status: COMPLETED | OUTPATIENT
Start: 2017-07-14 | End: 2017-07-14

## 2017-07-14 RX ORDER — FENTANYL CITRATE 50 UG/ML
25-50 INJECTION, SOLUTION INTRAMUSCULAR; INTRAVENOUS
Status: DISCONTINUED | OUTPATIENT
Start: 2017-07-14 | End: 2017-07-14 | Stop reason: HOSPADM

## 2017-07-14 RX ORDER — ATENOLOL 50 MG/1
50 TABLET ORAL 2 TIMES DAILY
Status: DISCONTINUED | OUTPATIENT
Start: 2017-07-15 | End: 2017-07-29

## 2017-07-14 RX ORDER — FLUMAZENIL 0.1 MG/ML
0.2 INJECTION, SOLUTION INTRAVENOUS
Status: DISCONTINUED | OUTPATIENT
Start: 2017-07-14 | End: 2017-07-14 | Stop reason: HOSPADM

## 2017-07-14 RX ORDER — NALOXONE HYDROCHLORIDE 0.4 MG/ML
.1-.4 INJECTION, SOLUTION INTRAMUSCULAR; INTRAVENOUS; SUBCUTANEOUS
Status: DISCONTINUED | OUTPATIENT
Start: 2017-07-14 | End: 2017-07-14 | Stop reason: HOSPADM

## 2017-07-14 RX ORDER — SODIUM CHLORIDE 9 MG/ML
INJECTION, SOLUTION INTRAVENOUS CONTINUOUS PRN
Status: DISCONTINUED | OUTPATIENT
Start: 2017-07-14 | End: 2017-07-14 | Stop reason: HOSPADM

## 2017-07-14 RX ORDER — MEROPENEM 500 MG/1
500 INJECTION, POWDER, FOR SOLUTION INTRAVENOUS EVERY 6 HOURS
Status: DISCONTINUED | OUTPATIENT
Start: 2017-07-14 | End: 2017-07-14

## 2017-07-14 RX ADMIN — MIDAZOLAM 0.5 MG: 1 INJECTION INTRAMUSCULAR; INTRAVENOUS at 15:58

## 2017-07-14 RX ADMIN — FENTANYL CITRATE 25 MCG: 50 INJECTION, SOLUTION INTRAMUSCULAR; INTRAVENOUS at 15:38

## 2017-07-14 RX ADMIN — ACETAMINOPHEN 650 MG: 325 TABLET, FILM COATED ORAL at 21:40

## 2017-07-14 RX ADMIN — PANTOPRAZOLE SODIUM 40 MG: 40 TABLET, DELAYED RELEASE ORAL at 05:47

## 2017-07-14 RX ADMIN — NAPROXEN 250 MG: 250 TABLET ORAL at 10:08

## 2017-07-14 RX ADMIN — MULTIPLE VITAMINS W/ MINERALS TAB 1 TABLET: TAB at 10:08

## 2017-07-14 RX ADMIN — ACETAMINOPHEN 650 MG: 325 TABLET, FILM COATED ORAL at 05:47

## 2017-07-14 RX ADMIN — MIDODRINE HYDROCHLORIDE 10 MG: 5 TABLET ORAL at 13:48

## 2017-07-14 RX ADMIN — GABAPENTIN 200 MG: 100 CAPSULE ORAL at 21:40

## 2017-07-14 RX ADMIN — CALCIUM CARBONATE 600 MG (1,500 MG)-VITAMIN D3 400 UNIT TABLET 2 TABLET: at 10:08

## 2017-07-14 RX ADMIN — TOPICAL ANESTHETIC 1 SPRAY: 200 SPRAY DENTAL; PERIODONTAL at 15:32

## 2017-07-14 RX ADMIN — GABAPENTIN 200 MG: 100 CAPSULE ORAL at 13:48

## 2017-07-14 RX ADMIN — LIDOCAINE HYDROCHLORIDE 15 ML: 20 SOLUTION ORAL; TOPICAL at 15:32

## 2017-07-14 RX ADMIN — SODIUM CHLORIDE 500 ML: 9 INJECTION, SOLUTION INTRAVENOUS at 09:00

## 2017-07-14 RX ADMIN — PREDNISONE 7.5 MG: 5 TABLET ORAL at 10:08

## 2017-07-14 RX ADMIN — Medication 2 G: at 12:00

## 2017-07-14 RX ADMIN — GABAPENTIN 200 MG: 100 CAPSULE ORAL at 10:08

## 2017-07-14 RX ADMIN — MIDODRINE HYDROCHLORIDE 10 MG: 5 TABLET ORAL at 10:00

## 2017-07-14 RX ADMIN — DIGOXIN 125 MCG: 125 TABLET ORAL at 10:09

## 2017-07-14 RX ADMIN — MIDODRINE HYDROCHLORIDE 10 MG: 5 TABLET ORAL at 18:13

## 2017-07-14 RX ADMIN — MICONAZOLE NITRATE: 2 POWDER TOPICAL at 12:00

## 2017-07-14 RX ADMIN — ACETAMINOPHEN 650 MG: 325 TABLET, FILM COATED ORAL at 13:48

## 2017-07-14 NOTE — PROGRESS NOTES
Cross Cover Note    Bree triggered LA protocol tonight with HR in 110s, chronic leukopenia, and BP 85/48 (MAP 58) without change in mental status and without lightheadedness. As this data is largely unchanged from prior and patient is afberile we will recheck blood pressure but forgo repeat LA check.    - recheck blood pressure  - if MAP < 65, will provide albumin fluid bolus    Roxana Ndiaye MD, PhD  [phone]

## 2017-07-14 NOTE — PLAN OF CARE
Problem: Goal Outcome Summary  Goal: Goal Outcome Summary  1. Pt will be free of fever/chills   OT-6C: Pt completed STS with CGA and FWW. Pt experienced slight LOB when removing brief but recovered quickly. Pt ambulated ~10  x2 with FWW and CGA. Pt completed shower transfer with CGA and vc s. Pt completed STS x3 on shower chair with CGA. Pt washed all of body with SBA except for back and bottom which required Max A. Pt reported SOB during activity. Pt transferred from seated EOB to supine with Mod A for BLE. Pt SpO2 94-96% on RA.  REC: Discharge to ARU to continue to increase IND in ADLs and activity tolerance.

## 2017-07-14 NOTE — PLAN OF CARE
Problem: Goal Outcome Summary  Goal: Goal Outcome Summary  1. Pt will be free of fever/chills   Outcome: No Change  BPs Soft but stable. Afebrile. Denies pain. Afib. Has been resting comfortably this shift. Assisted with repositioning as needed.  Lymph wraps in place on legs. Continue to monitor.

## 2017-07-14 NOTE — PLAN OF CARE
Problem: Goal Outcome Summary  Goal: Goal Outcome Summary  1. Pt will be free of fever/chills   PT 6C: patient preparing to leave unit for procedure and not available to work with PT.

## 2017-07-14 NOTE — PLAN OF CARE
Problem: Sepsis (Adult)  Goal: Signs and Symptoms of Listed Potential Problems Will be Absent or Manageable (Sepsis)  Signs and symptoms of listed potential problems will be absent or manageable by discharge/transition of care (reference Sepsis (Adult) CPG).   Outcome: No Change  D/I: Monitor shows A-fib 100-120s. Episodes of decrease in HR to 80s with blocks in AM, but nothing for rest of shift. Atenolol DC'd. Sepsis protocol triggered with HR>100, BP<90, WBC 2.2. Afebrile. Provider aware. 500 ml NS fluid flush given over 2 hours, but no lactic acid drawn. Lasix dc'd. Continues with flank and leg edema-wearing compression stocking. NPO after 10AM for BRE. Left for BRE at 1500. Denies pain. C/O shortness of breath when up to shower with OT, but recovered with rest. Incontinent of urine, no BM this shift.  present during shift. See flowsheets for assessments and additional data.  A: No sepsis noted. No fevers. Continues with LE edema and urinary incontinence.   P: Plan for BRE. Waiting for BM BX results. To be seen by ID and heme/onc. Encourage increase activity and participation in cares. Continue current cares and notify providers with questions or concerns.     07/14/17 1530   Sepsis   Problems Assessed (Sepsis) all   Problems Present (Sepsis) situational response

## 2017-07-14 NOTE — PROGRESS NOTES
Pt arrived in ECHO department  for BRE.   Procedure explained, questions answered and consent signed. Post procedure instructions discussed with patient.  Pt's throat sprayed at 3:35 pm, therefore pt will not be able to eat or drink until 2 hours after at 5:35 pm.  Informed pt of this time and encouraged to start with warm fluids and soft foods.    Pt tolerated procedure well, and was given a total of 25 mcg IV fentanyl and 0.5 mg IV versed for conscious sedation.  Pt denied throat or chest pain after BRE complete.   GE BRE probe used for procedure.  Transferred back to unit 6C after awake and VSS.  Report given to Harry POTTER.

## 2017-07-14 NOTE — CONSULTS
General Infectious Disease Service Consultation     Patient:  Amelia Michel   Date of birth 1960, Medical record number [id number]  Date of Visit:  07/14/2017  Date of Admission: 6/19/2017  Consult Requester:No att. providers found            Assessment and Recommendations:   ASSESSMENT:  1. Fever of unknown origin. Infectious workup including blood cultures (from 6/26/2017 to the most recent on 7/12/2017) have shown no growth to date. Enteric bacteria and virus panel by FABIOLA stool (7/2), parasite stain for Anaplasma and Babesia (7/7), A. phagocytophil IgG (7/7), M. Tuberculosis quantiferon (7/8), Lyme IgG (7/6), catheter tip cultures (7/8), c.diff (7/3) have all been negative. UA (7/10) was negative. At this point, an infectious source of fevers seems unlikely. While she does have a procalcitonin level of 2.33, she has remained afebrile since 7/12 while off any antibiotics and does not have any signs or symptoms of a systemic infection at this point clinically.   2. Possible pneumonia while recently on meropenem. Her most recent CT chest (7/13) did reveal a right lung tree-in-bud opacities with more consolidative opacities in RLL concerning for pneumonia. However, she has remained afebrile since 7/12 and does not have any complaints of sore throat, cough, chest pain, shortness of breath or a positive lung exam that would indicate that she currently has a pneumonia.   3. Vegetations on BER. This meets one of the major Duke criteria for endocarditis. IE antibiotic treatment would be indicated in patients with 2 major criteria, 1 minor criteria + 3 minor criteria or 5 minor criteria. Currently she meets 1 major (vegetation on BRE) and possibly 1-2 minor criteria (fever and splenic infarct). Hence, there is no indication for antibiotics at this time. Serology for Q fever and Bartonella would be helpful in this case since these are common sources of culture negative endocarditis.        RECOMMENDATION:  1. Agree with primary team's decision to discontinue antibiotics.   2. Obtain serology for Q fever and Bartonella as these are the most common causes of culture negative endocarditis.    3. If patient develops respiratory symptoms, we can consider IV Zosyn 3.375mg Q6H. However, if patient spikes another fever, we do not recommend starting antibiotics immediately in the absence of respiratory symptoms or a confirmed source of infection.     Attestation:  This patient has been seen and evaluated by me, Romeo Chow MD.  I discussed this patient with the fellow and/or resident(s) and agree with the findings and plan in this note. I also personally edited this note to reflect my findings. I have reviewed today's vital signs, medications, labs and imaging.   MEME Chow M.D.  [facility] Service Staff  [phone]     ________________________________________________________________    Consult Question: fever of unknown origin and concerns of possible pneumonia while on meropenem  Admission Diagnosis: Sepsis  Sepsis (H)  Wound of left upper extremity  Wound of left upper extremity         History of Present Illness:   Mrs. Michel is a pleasant 56-year-old woman with a history of RA, a.fib/flutter and recent cardiac arrest over a month ago with multiple complications admitted for left arm wound and cellulitis 2/2 IV extravasation and has been consulted for fevers of unknown origin and concerns for pneumonia while on Meropenem. She was discharged to ARU 3 weeks ago but was readmitted with worsening LUE wound and fevers. Initial wound thought to be secondary to IV extravasation. The wound grew Enterobacter, Enterococcus and Coag negative Staph and she received broad spectrum antibiotics for this.  Not clear how much cellulitis vs chemical burn. She was subsequently switched to TMP-SMX after ID consultation.  The wound has been improving steadily. She then started developing fevers around 7/1/17.   The wound seems underwhelming as source of infection.  And without a clear source of infection, empiric antibiotics were stopped 7/6/2017. It is possible that previous broad spectrum antibiotics for wound were treating occult infection and this was 'uncovered' with deescalation of antibiotics but imaging did not reveal other source. Non-infectious causes of fever particularly with h/o RA and current pancytopenia seems likely. She has some baseline immunosuppression with chronic steroids and recent critical illness.     At this time, she has no other localizing sources of fever. She has remained hemodynamically stable. Moreover, infectious workup including blood cultures (from 6/26/2017 to the most recent on 7/12/2017) have shown no growth to date. Enteric bacteria and virus panel by FABIOLA stool (7/2), parasite stain for Anaplasma and Babesia (7/7), A. phagocytophil IgG (7/7), M. Tuberculosis quantiferon (7/8), Lyme IgG (7/6), catheter tip cultures (7/8), c.diff (7/3) have all been negative. UA (7/10) was negative. An infectious source of fevers seems unlikely at this time. While she does have a procalcitonin level of 2.33, she has remained afebrile since 7/12 while off any antibiotics and does not have any signs or symptoms of a systemic infection.      Given recent history of arrest, there has been a focus on hospital-acquired infections. At this time, there is no indication for antibiotics given her lack of cough, sore throat, chest pain or shortness of breath to indicate a possible pneumonia even in the context of the most recent CT chest (7/13) revealing a right lung tree-in-bud opacities with more consolidative opacities in RLL concerning for pneumonia.     Vegetations were also noted on BRE today concerning for infective endocarditis. However, she only meets 1 major Duke criteria for IE. IE antibiotic treatment would be indicated in patients with patients with 2 major criteria, 1 minor criteria + 3 minor  criteria or 5 minor criteria. Hence, there is no indication for antibiotics at this time. Serology for Q fever and Bartonella would be helpful in this case since these are common sources of culture negative endocarditis. Some other organisms also noted in culture negative endocarditis include Chlamydia spp, Legionella spp, Mycoplasma spp, and Brucella.     At this point, we would continue to monitor her off antibiotics, which may also help from a diagnostic standpoint. Rheumatology and hematology have been consulted for evaluation of other sources of fevers.      Recent culture results include:  Culture Micro   Date Value Ref Range Status   07/12/2017 No growth after 2 days  Final   07/12/2017 No growth after 2 days  Final   07/10/2017 No growth after 4 days  Final   07/10/2017 No growth after 4 days  Final   07/08/2017 No growth  Final   07/08/2017 Culture negative monitoring continues  Final   07/07/2017 No growth  Final   07/07/2017 No growth  Final   07/06/2017 No growth  Final   07/06/2017 No growth  Final              Review of Systems:   CONSTITUTIONAL:  No fevers or chills  EYES: negative for icterus  ENT:  negative for hearing loss, tinnitus and sore throat  RESPIRATORY:  negative for cough with sputum and dyspnea  CARDIOVASCULAR:  negative for chest pain, dyspnea  GASTROINTESTINAL:  negative for nausea, vomiting, diarrhea and constipation  GENITOURINARY:  negative for dysuria  HEME:  No easy bruising  INTEGUMENT:  negative for rash and pruritus  NEURO:  Negative for headache           Past Medical History:     Past Medical History:   Diagnosis Date     Hypertension      Rheumatoid arthritis(714.0)             Past Surgical History:     Past Surgical History:   Procedure Laterality Date     ANESTHESIA CARDIOVERSION N/A 5/17/2017    Procedure: ANESTHESIA CARDIOVERSION;  ANESTHESIA CARDIOVERSION;  Surgeon: GENERIC ANESTHESIA PROVIDER;  Location: [facility] OR     ARTHRODESIS WRIST  2000    Right wrist     BONE MARROW  ASPIRATE &BIOPSY  7/12/2017          FOOT SURGERY      4 left and 2 right     RELEASE CARPAL TUNNEL BILATERAL       REPAIR VENTRICULAR SEPTAL DEFECT  1969            Family History:     Family History   Problem Relation Age of Onset     Breast Cancer Mother      Hypertension Mother      Alzheimer Disease Mother      Hypertension Father      Blood Disease Father      Lymphoa     Circulatory Father      A Fib     DIABETES Paternal Grandmother             Social History:     Social History   Substance Use Topics     Smoking status: Never Smoker     Smokeless tobacco: Never Used     Alcohol use Yes      Comment: Glass of wine two evenings a night     History   Sexual Activity     Sexual activity: Not on file            Current Medications (antimicrobials listed in bold):       midodrine  10 mg Oral TID w/meals     acetaminophen  650 mg Oral Q8H     miconazole   Topical BID     predniSONE  7.5 mg Oral Daily     calcium carbonate  500 mg Oral Daily     pantoprazole  40 mg Oral QAM     digoxin  125 mcg Oral Daily     sodium chloride (PF)  10 mL Intracatheter Q8H     gabapentin  200 mg Oral TID     morphine 0.1% in solosite  2 g Transdermal TID     multivitamin, therapeutic with minerals  1 tablet Oral Daily     calcium-vitamin D  2 tablet Oral Daily     melatonin  1 mg Oral At Bedtime            Allergies:     Allergies   Allergen Reactions     No Clinical Screening - See Comments      Penicillin Allergy Skin Test not performed, see antimicrobial management team progress note 7/5/17.       Penicillins      Tape [Adhesive Tape] Rash            Physical Exam:   Vitals were reviewed  Patient Vitals for the past 24 hrs:   BP Temp Temp src Pulse Heart Rate Resp SpO2 Weight   07/14/17 1614 93/75 - - 108 - 16 94 % -   07/14/17 1611 104/66 - - 108 - 16 99 % -   07/14/17 1608 93/63 - - 109 - 16 98 % -   07/14/17 1605 (!) 89/63 - - 109 - 16 99 % -   07/14/17 1602 95/65 - - 110 - 14 99 % -   07/14/17 1559 97/57 - - 110 - 14 99 % -    07/14/17 1556 97/59 - - 112 - 14 99 % -   07/14/17 1553 92/60 - - 113 - 14 100 % -   07/14/17 1550 101/58 - - 111 - 16 100 % -   07/14/17 1547 97/62 - - 112 - 15 99 % -   07/14/17 1544 94/66 - - 113 - 18 99 % -   07/14/17 1541 95/67 - - 112 - 18 98 % -   07/14/17 1538 105/55 - - 113 - 18 98 % -   07/14/17 1519 100/64 - - 112 112 18 98 % -   07/14/17 1100 (!) 88/65 98.1  F (36.7  C) Oral - 119 18 94 % -   07/14/17 0857 91/59 - - - 119 18 - -   07/14/17 0800 (!) 86/49 98.1  F (36.7  C) Oral - 112 18 92 % -   07/14/17 0600 - - - - - - - 74.7 kg (164 lb 9.6 oz)   07/14/17 0546 (!) 89/57 97.7  F (36.5  C) Axillary - 113 18 - -   07/14/17 0158 (!) 80/63 - - - 98 18 - -   07/14/17 0001 (!) 85/48 98.3  F (36.8  C) Axillary - 113 18 91 % -   07/13/17 1942 92/62 97.6  F (36.4  C) Oral 117 116 18 92 % -     Ranges for his vital signs:  Temp:  [97.6  F (36.4  C)-98.3  F (36.8  C)] 98.1  F (36.7  C)  Pulse:  [108-117] 108  Heart Rate:  [] 112  Resp:  [14-18] 16  BP: ()/(48-75) 93/75  SpO2:  [91 %-100 %] 94 %    Physical Examination:  GENERAL:  well-developed, well-nourished, in bed in no acute distress.   HEENT:  Head is normocephalic, atraumatic   EYES:  Eyes have anicteric sclerae without conjunctival injection or stigmata of endocarditis.    ENT:  Oropharynx is moist without exudates or ulcers. Tongue is midline  NECK:  Supple. No  Cervical lymphadenopathy  LUNGS:  Clear to auscultation bilateral.   CARDIOVASCULAR:  Regular rate and rhythm with no murmurs, gallops or rubs.  ABDOMEN:  Normal bowel sounds, soft, nontender. No appreciable hepatosplenomegaly  SKIN:  No acute rashes.  Line(s) are in place without any surrounding erythema or exudate. Left upper extremity erosion with yellow exudate covered in clean, dry dressing without surrounding erythema.  NEUROLOGIC:  Grossly nonfocal. Active x4 extremities         Laboratory Data:     Inflammatory Markers    Recent Labs   Lab Test  07/14/17   0746  07/12/17    0641  07/06/17   0704  07/02/17   0641  06/20/17   0709  06/19/17   1612  06/04/17   0408  06/03/17   0307   05/30/17   0347  05/29/17   1055   03/20/16   1738   SED   --    --    --    --    --   63*   --    --    --   34*  22   --   29   CRP  83.0*  95.0*  61.0*  69.0*  110.0*  98.0*  23.0*  29.0*   < >  110.0*  58.0*   < >  28.4*    < > = values in this interval not displayed.       Hematology Studies  Recent Labs   Lab Test  07/14/17   0746  07/13/17 2017 07/12/17   0641  07/10/17   0546  07/08/17   1308  07/07/17   0715  07/06/17   1318   WBC  2.2*  2.9*  2.1*  1.6*  2.6*  1.4*  1.7*   ANEU  1.3*  2.0  1.2*  0.9*  1.8   --   1.2*   AEOS  0.0  0.0  0.0  0.0  0.0   --   0.0   HGB  7.8*  8.0*  8.9*  7.5*  8.7*  7.4*  8.4*   MCV  95  94  94  94  93  93  93   PLT  39*  45*  51*  43*  45*  27*  34*       Immune Globulin Studies  No lab results found.    Metabolic Studies   Recent Labs   Lab Test  07/14/17   0746  07/13/17 2017 07/13/17   0744  07/12/17   0641  07/11/17   0824   NA  136  135  137  134  136   POTASSIUM  4.3  4.7  4.0  4.0  3.8   CHLORIDE  101  101  102  99  101   CO2  26  26  27  25  29   BUN  40*  37*  36*  28  24   CR  1.09*  0.97  0.80  0.67  0.66   GFRESTIMATED  52*  59*  74  >90  Non  GFR Calc    >90  Non  GFR Calc         Hepatic Studies  Recent Labs   Lab Test  07/14/17   0746  07/02/17   0641  06/28/17   0555  06/27/17   0826  06/19/17   0608  06/16/17   0609  06/15/17   0556  06/05/17   0354   BILITOTAL   --   0.9  0.8  0.6  0.7  0.8  0.8  1.7*   ALKPHOS   --    --   126  129  180*  159*  168*  149   ALBUMIN  2.5*   --   1.5*  1.4*  1.8*  1.8*  1.8*  1.9*   AST   --    --   41  54*  41  34  31  43   ALT   --    --   50  60*  63*  60*  73*  213*       Thyroid Studies    Recent Labs   Lab Test  05/15/17   1752   TSH  1.03       Microbiology:  Culture Micro   Date Value Ref Range Status   07/12/2017 No growth after 2 days  Final   07/12/2017 No growth after 2  days  Final   07/10/2017 No growth after 4 days  Final   07/10/2017 No growth after 4 days  Final   07/08/2017 No growth  Final   07/08/2017 Culture negative monitoring continues  Final   07/07/2017 No growth  Final   07/07/2017 No growth  Final   07/06/2017 No growth  Final   07/06/2017 No growth  Final   07/06/2017 (A)  Final    <10,000 colonies/mL Candida tropicalis Susceptibility testing not routinely done   07/04/2017 No growth  Final   07/04/2017 No growth  Final   07/02/2017 No growth  Final   07/02/2017 No growth  Final   07/02/2017 No growth  Final   07/01/2017 No growth  Final   07/01/2017 No growth  Final   06/26/2017 No growth  Final   06/26/2017 No growth  Final   06/26/2017 No growth  Final   06/19/2017   Final    50,000 to 100,000 colonies/mL mixed urogenital harshal Susceptibility testing not   routinely done     06/19/2017 (A)  Final    Heavy growth Enterobacter cloacae complex  Moderate growth Enterococcus faecalis  Moderate growth Coagulase negative Staphylococcus Susceptibility testing not   routinely done     06/19/2017 No anaerobes isolated  Final   06/19/2017 No growth  Final   06/19/2017 No growth  Final   06/01/2017 No growth  Final   05/31/2017 Light growth Normal harshal  Final   05/31/2017 No growth  Final   05/30/2017 Light growth Normal harshal  Final   05/30/2017 No growth  Final   05/29/2017 No growth  Final   05/29/2017 Moderate growth Normal harshal  Final   05/29/2017   Final    Canceled, Test credited Cancelled by lab - Specimen never received.   05/29/2017 <1000 colonies/mL urogenital harshal  Final   05/21/2017 No growth  Final   05/21/2017 No growth  Final   05/21/2017 (A)  Final    >100,000 colonies/mL Escherichia coli ESBL ESBL (extended beta lactamase)   producing organisms require contact precautions.  Critical Value/Significant Value called to and read back by Agnes Molina RN [facility] [place]   05.22.17 2054 CF         Urine Studies    Recent Labs   Lab Test  07/10/17   0220  07/07/17   0639   07/02/17   1416  07/02/17   0950  07/01/17   1123  06/26/17   1115   LEUKEST  Negative  Negative  Small*  Moderate*  Negative  Trace*   WBCU  1  2  5*  15*   --   1       Vancomycin Levels    Recent Labs   Lab Test  07/03/17   1155  07/03/17   0008  06/23/17   1051   VANCOMYCIN  24.2  21.7  26.6*       Virology:  CMV viral loads    Recent Labs   Lab Test  07/06/17   1318   CSPEC  EDTA PLASMA  CORRECTED ON 07/07 AT 0850: PREVIOUSLY REPORTED AS Whole blood, EDTA   anticoagulant       No results found for: CMQNT, CMVQ  EBV viral loads     Recent Labs   Lab Test  07/06/17   1318   EBRES  EBV DNA Not Detected     EBV DNA Copies/mL   Date Value Ref Range Status   07/06/2017 EBV DNA Not Detected EBVNEG [Copies]/mL Final

## 2017-07-14 NOTE — PLAN OF CARE
Problem: Goal Outcome Summary  Goal: Goal Outcome Summary  1. Pt will be free of fever/chills   Edema 6C: Recommend continued use of thigh high Jobst compression garments, size M, 20-30mmHg. Jobst to be worn during the day time only and removed at night - see patient care order for details. Apply custom velcro garments during the night for continued management of LE edema. Skin remains intact with 1+ pitting distally and 2+ in proximal LEs. Second set of Jobst stockings in room to allow for washing schedule.

## 2017-07-14 NOTE — CONSULTS
[facility] Inpatient Consult Dermatology Note    Impression/Plan:  56 year old female with history of RA, afib/flutter and recent cardiac arrest with multiple complications admitted with left arm wound and cellulitis, who is seen in consultation for a concerning lesion on the left occipital scalp.  1. Traumatic erosion with secondary crust.    Although grossly concerning for a pigmented lesion, further examination reveals the dark papule is in fact crust overlying a thin, healing erosion. Appears to be traumatic, potentially 2/2 her recent EEG's, etc. Cannot r/u pressure ischemia induced erosion with overlying crust with arrest/treatment. Appears to have only been present for the last week or so when questioned closely. At this point, recommend full removal of crust to allow for healing. If the lesion is still present in 3-4 weeks, please consult outpatient derm and we will further evaluate in outpatient setting. Very low concern for underlying NMSC. No concern for melanocytic lesions like melanoma.   -Apply saline soaked gauze for 20 minutes with nursing. Remove crust completely. Cover with vaseline BID until healed.     2. Punched out erosion on the LUE 2/2 extravasation with possible secondary infection. At this point, lack of pain or worsening with debridement (no pathergy) makes traumatic/ infectious etiologies most likely (as previously suggested), however, if she develops additional lesions at sites of trauma, or the site begins to worsen in the context of debridement, Pyoderma Gangrenosum could be considered; Particularly in light of her rheumatologic hx and ongoing/unexplained fevers etc. We do not feel the patient has evidence of PG today; however, if the presentation changes significantly, please re-consult with concerns.       Thank you for the dermatology consultation. Please do not hesitate to contact the dermatology resident/faculty on call for any additional questions or concerns.  Dermatology will sign off.     Frank Mcgill  Dermatology Resident    I have seen and examined this patient and agree with the assessment and plan as documented in the resident's note.    Selvin Shelley MD  Dermatology Attending        Date of Admission: Jun 19, 2017   Encounter Date: 7/13/2017     Reason for Consultation:   Scalp lesion    History of Present Illness:  Ms. Amelia Michel is a 56 year old female with history of RA, afib/flutter and recent cardiac arrest with multiple complications admitted with left arm wound and cellulitis 2/2 IV extravasation, who is seen in consultation for a concerning lesion on the left occipital scalp. Patient initially states she has felt something on the scalp for 6 months; however, later when pressed, she notes that she has only known about the lesion for 1 week. She could not confirm it was present prior to this. It is not painful, bleeding, pruritic or otherwise. No personal or family hx of skin malignancy.     Ongoing waxing and waning fevers of unclear etiology. BM bx pending at this point. Hx of RA on mtx, pred and plaquenil. Ulceration of the right arm s/p IV exravasation. Frequently debrided. No ulcers elsewhere. WOC on board. Appears to be improving without evidence of pathergy to date.        Past Medical History:   Patient Active Problem List   Diagnosis     HTN (hypertension)     Primary pulmonary hypertension (H)     Hyperlipidemia LDL goal <130     Other rheumatoid arthritis with rheumatoid factor of multiple sites (H)     Lymphedema of both lower extremities     Atrial tachycardia (H)     Cardiogenic shock (H)     Acute respiratory failure with hypoxia (H)     Hypertension     Critical illness myopathy     Sepsis (H)     Wound of left upper extremity     Past Medical History:   Diagnosis Date     Hypertension      Rheumatoid arthritis(714.0)      Past Surgical History:   Procedure Laterality Date     ANESTHESIA CARDIOVERSION N/A 5/17/2017    Procedure:  ANESTHESIA CARDIOVERSION;  ANESTHESIA CARDIOVERSION;  Surgeon: GENERIC ANESTHESIA PROVIDER;  Location: [facility] OR     ARTHRODESIS WRIST  2000    Right wrist     BONE MARROW ASPIRATE &BIOPSY  7/12/2017          FOOT SURGERY      4 left and 2 right     RELEASE CARPAL TUNNEL BILATERAL       REPAIR VENTRICULAR SEPTAL DEFECT  1969         Social History     Social History     Marital status:      Spouse name: Romeo Michel     Number of children: 0     Years of education: N/A     Occupational History      [employer]     Social History Main Topics     Smoking status: Never Smoker     Smokeless tobacco: Never Used     Alcohol use Yes      Comment: Glass of wine two evenings a night     Drug use: No     Sexual activity: Not on file     Other Topics Concern     Parent/Sibling W/ Cabg, Mi Or Angioplasty Before 65f 55m? No     Social History Narrative       Family History:  No skin cancer.     Medications:  Current Facility-Administered Medications   Medication     sodium chloride (PF) 0.9% PF flush 3 mL     sodium chloride (PF) 0.9% PF flush 3 mL     May take oral meds with a sip of water, the morning of BRE procedure.     HOLD: Insulin - REGULAR AM of procedure IF diabetic     HOLD: Insulin - RAPID/SHORT acting AM of procedure IF diabetic     HOLD: Oral hypoglycemics AM of procedure IF diabetic     Give   of usual dose of LONG ACTING insulin AM of procedure IF diabetic     furosemide (LASIX) injection 40 mg     midodrine (PROAMATINE) tablet 10 mg     atenolol (TENORMIN) tablet 50 mg     naproxen (NAPROSYN) tablet 250 mg     acetaminophen (TYLENOL) tablet 650 mg     potassium chloride SA (K-DUR/KLOR-CON M) CR tablet 40 mEq     miconazole (MICATIN; MICRO GUARD) 2 % powder     predniSONE (DELTASONE) tablet 7.5 mg     calcium carbonate (TUMS) chewable tablet 500 mg     pantoprazole (PROTONIX) EC tablet 40 mg     digoxin (LANOXIN) tablet 125 mcg     ondansetron (ZOFRAN) tablet 4 mg     prochlorperazine  (COMPAZINE) injection 5-10 mg     ondansetron (ZOFRAN) injection 4 mg     simethicone (MYLICON) chewable tablet 80 mg     sodium chloride (PF) 0.9% PF flush 10-20 mL     sodium chloride (PF) 0.9% PF flush 10 mL     heparin lock flush 10 UNIT/ML injection 5-10 mL     dextrose 10 % 1,000 mL infusion     gabapentin (NEURONTIN) capsule 200 mg     melatonin tablet 3 mg     morphine 0.1% in solosite topical gel 2 g     multivitamin, therapeutic with minerals (THERA-VIT-M) tablet 1 tablet     triamcinolone (KENALOG) 0.1 % ointment     lidocaine 1 % 1 mL     lidocaine (LMX4) kit     sodium chloride (PF) 0.9% PF flush 3 mL     sodium chloride (PF) 0.9% PF flush 3 mL     medication instruction     acetaminophen (TYLENOL) Suppository 650 mg     Patient is already receiving anticoagulation with heparin, enoxaparin (LOVENOX), warfarin (COUMADIN)  or other anticoagulant medication     glucose 40 % gel 15-30 g    Or     dextrose 50 % injection 25-50 mL    Or     glucagon injection 1 mg     naloxone (NARCAN) injection 0.1-0.4 mg     calcium-vitamin D (CALTRATE) 600-400 MG-UNIT per tablet 2 tablet     morphine 0.1% in solosite topical gel     potassium chloride SA (K-DUR/KLOR-CON M) CR tablet 20-40 mEq     potassium chloride (KLOR-CON) Packet 20-40 mEq     potassium chloride 10 mEq in 100 mL intermittent infusion     potassium chloride 10 mEq in 100 mL intermittent infusion with 10 mg lidocaine     potassium chloride 20 mEq in 50 mL intermittent infusion     magnesium sulfate 2 g in NS intermittent infusion (PharMEDium or FV Cmpd)     magnesium sulfate 4 g in 100 mL sterile water (premade)     sodium phosphate 10 mmol in D5W intermittent infusion     sodium phosphate 15 mmol in D5W intermittent infusion     sodium phosphate 20 mmol in D5W intermittent infusion     sodium phosphate 25 mmol in D5W intermittent infusion     melatonin tablet 1 mg     traMADol (ULTRAM) half-tab 25 mg          Allergies   Allergen Reactions     No  "Clinical Screening - See Comments      Penicillin Allergy Skin Test not performed, see antimicrobial management team progress note 7/5/17.       Penicillins      Tape [Adhesive Tape] Rash         Review of Systems:  -A ten point review of systems was performed and negative except as per HPI.     Physical exam:  Vitals: BP 92/62 (BP Location: Right arm)  Pulse 117  Temp 97.6  F (36.4  C) (Oral)  Resp 18  Ht 1.48 m (4' 10.25\")  Wt 75.1 kg (165 lb 9.6 oz)  SpO2 92%  BMI 34.31 kg/m2  GEN: This is a well developed, well-nourished female in no acute distress, in a pleasant mood.    SKIN: Focused examination of thescalp, head, neck, upper chest, abdomen, arms and back was performed.  -Over the left occipital scalp we find a well demarcated 7 mm dark brown stuck adherent crust overlying a thin, atrophic macule with healing fibrinous base. Hear stuck within the overlying crust; removed with ease.   -No other lesions of concern on areas examined.     Laboratory:  Results for orders placed or performed during the hospital encounter of 06/19/17 (from the past 24 hour(s))   Cortisol   Result Value Ref Range    Cortisol Serum 9.9 4 - 22 ug/dL   Basic metabolic panel   Result Value Ref Range    Sodium 137 133 - 144 mmol/L    Potassium 4.0 3.4 - 5.3 mmol/L    Chloride 102 94 - 109 mmol/L    Carbon Dioxide 27 20 - 32 mmol/L    Anion Gap 8 3 - 14 mmol/L    Glucose 105 (H) 70 - 99 mg/dL    Urea Nitrogen 36 (H) 7 - 30 mg/dL    Creatinine 0.80 0.52 - 1.04 mg/dL    GFR Estimate 74 >60 mL/min/1.7m2    GFR Estimate If Black 90 >60 mL/min/1.7m2    Calcium 8.5 8.5 - 10.1 mg/dL   Magnesium   Result Value Ref Range    Magnesium 2.3 1.6 - 2.3 mg/dL   CT Chest w Contrast    Narrative    EXAMINATION: Chest CT  with IV contrast 7/13/2017     CLINICAL HISTORY: Patient with fevers of unknown origin.    COMPARISON: 7/1/2017, 5/29/2017.    TECHNIQUE: CT imaging obtained through the chest with IV contrast.  Coronal and axial MIP reformatted " images obtained. Contrast: isovue  370.     FINDINGS:  Moderate bilateral pleural effusions. Associated atelectasis. No  pericardial effusion. Visualized thyroid gland is notable for  substernal extension of the parenchyma into the anterior mediastinum.  Heterogeneous attenuation without discrete nodule. Typical branching  pattern of the great vessels. Sternotomy wires. Mild atherosclerotic  calcifications of the thoracic aorta. Coarse calcifications of the  anterior mediastinal fat, just posterior to the sternum. No associated  soft tissue mass. Improving generalized anasarca. Mildly enlarged  mediastinal and paratracheal lymph nodes. For example, the right lower  paratracheal lymph node (series 2, image 18) measures 1.7 cm in short  axis diameter. No    No perihilar or axillary lymphadenopathy. Interlobular septal  thickening and scattered groundglass opacities, particularly involving  the lingula. Tree-in-bud opacities of the right lung. Consolidative  opacities involving the right lower lobe.    Mild to moderate cardiomegaly. Central tracheobronchial tree is  patent. No pneumothorax.    Bones and soft tissues: Subacute right anterolateral second through  sixth rib fractures. Subacute left third through fifth anterior rib  fractures. Mild degenerative changes of spine, including anterior  osteophytes and intervertebral vacuum disc phenomenon.    Partially imaged upper abdomen: Limited. Evolving infarcts of the  spleen. Mild generalized hepatic steatosis without focal mass or  cirrhosis.      Impression    IMPRESSION:   1. Right lung tree-in-bud opacities with more consolidative opacities  in the right lower lobe, concerning for pneumonia. Presumably reactive  paratracheal lymphadenopathy.  2. Cardiomegaly and interlobular septal thickening, the latter likely  reflecting mild pulmonary edema.  3. Bilateral pleural effusions. Improving generalized anasarca.  4. Evolving splenic infarcts.  5. Multiple bilateral  subacute rib fractures. No pneumothorax.  6. 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   Basic metabolic panel   Result Value Ref Range    Sodium 135 133 - 144 mmol/L    Potassium 4.7 3.4 - 5.3 mmol/L    Chloride 101 94 - 109 mmol/L    Carbon Dioxide 26 20 - 32 mmol/L    Anion Gap 9 3 - 14 mmol/L    Glucose 143 (H) 70 - 99 mg/dL    Urea Nitrogen 37 (H) 7 - 30 mg/dL    Creatinine 0.97 0.52 - 1.04 mg/dL    GFR Estimate 59 (L) >60 mL/min/1.7m2    GFR Estimate If Black 71 >60 mL/min/1.7m2    Calcium 8.1 (L) 8.5 - 10.1 mg/dL   CBC with platelets differential   Result Value Ref Range    WBC 2.9 (L) 4.0 - 11.0 10e9/L    RBC Count 2.64 (L) 3.8 - 5.2 10e12/L    Hemoglobin 8.0 (L) 11.7 - 15.7 g/dL    Hematocrit 24.9 (L) 35.0 - 47.0 %    MCV 94 78 - 100 fl    MCH 30.3 26.5 - 33.0 pg    MCHC 32.1 31.5 - 36.5 g/dL    RDW 24.9 (H) 10.0 - 15.0 %    Platelet Count 45 (LL) 150 - 450 10e9/L    Diff Method Pending        Dr. Selvin Shelley staffed the patient.    Staff Involved:  Resident(Frank Mcgill)/Staff(as above)    Frank Mcgill MD  PGY3 Dermatology

## 2017-07-14 NOTE — PROGRESS NOTES
Cardiology Daily Note   Date of Service: 7/9/2017  Patient: Amelia Michel  MRN: [id number]  Admission Date: 6/19/2017  Hospital Day # 25    Assessment & Plan:   Amelia Michel is a 56 year old female with PMHx of VSD s/p repair (1969), RA, recently diagnosed atrial fibrillation that was complicated with an OOH cardiac arrest (felt 2/2 long pause with degeneration to VFib while converting Afib to SR with multiple AV prieto agents) who was readmitted from ARU on 6/19 with fever and concern for worsening LUE infection. She is now transferred back to cardiology service as primary on 6/26 for further workup and evaluation of recurrent lactic acidosis, persistent afib and softer blood pressures with tachypnea.     # Volume overload/anasarca:  # HFrEF (EF 50-55%):  # Hypotension:  Patient initially up 35# at admission; diuresis initially challenging, secondary to third spacing of fluid (albumin 1.9), but briskly responded with addition of midodrine and ongoing albumin resuscitation. Right heart cath 6/26 with mildly elevated right and left sided filling presures  - Holding diuresis 2/2 CELSA   - Continue midodrine to 10 mg TID     # CELSA   Likely prerenal with combination of diuresis and NSAID use for FUO. Also consider contrast injury given CT chest yesterday   - D/C naproxen   - Holding diuresis  - NS bolus     # Atrial fibrillation/flutter with RVR:  RVR in setting of volume overload above. Patient rec'd single dose of diltiazem with associated soft BPs.   - Continue dig 125mcg  - Atenolol 50 mg BID    - Not anticoagulated due to thrombocytopenia    # Out of hospital arrest:  Pt will need to wear LifeVest until medically appropriate and cleared from infection/wound standpoint for ICD implantation likely 4-6 weeks after discharge per EP (pending hospital course for LUE wound)    # Fever of unknown origin  Previously attributes to LUE wound, though patient has continued to have fevers despite broad spectrum  antibiotics. ID consulted and feels that unlikely secondary to infection. Discontinued antibiotics with close monitoring. Patient without DVT, no indolent nidus of infection noted on MRI spine, LUE wound continues to improve without antibiotics. Ct chest 7/13/17 with evidence of pneumonia with elevated procalcitonin.    - Patient neutropenic, not to degree to warrant antibiotics for fever at present  - Heme/onc following, appreciate recs  - Bmbx results from 7/12/17 pending  - BRE today 7/14/17  - ID consultation, appreciate recommendations      # Acute on Chronic Thrombocytopenia:   Plts noted to decrease from 104 to as low as 15 during recent hospitalization requiring transfusion w/ chronically low in the 's as outpatient. Possible bactrim was offending agent for exacerbation, and discontinued this medication (exchanged for meropenem in the setting of worsening cellulitis)  - If this is chronic ITP, no need for treatment unless plts drift < 10s if not bleeding  - Continue to hold RA meds for now  - D/C apixiban (last dose 7/8/17)    # Acute on chronic anemia: Stable  No evidence of overt bleeding.   - Daily CBC    # RA:   History of seropositive rheumatoid arthritis (anti-CCP positive) since late 1980;s w/ multiple MTP osteotomies, partial right wrist fusion, longstanding therapy consisting of MTX, prednisone and HCQ  - Continue with Prednisone taper. Now on 7.5 mg on 7/1 then to 5 mg daily after 2 weeks if continues to have low disease activity  - Rheumatology following  - Continue to hold HCQ and MTX until outpatient follow-up  - Follow-up [facility] Dr. Conor Cunha in 4-6 weeks after discharge      # Severe Critical Illness Myopathy:   Significant deconditioning w/ median neuropathies most likely localized to the wrists and ulnar neuropathies most likely localized to the elbows secondary to RA.  - Ongoing extensive PT/OT/SLP    Consulting Services: Nutrition, heme/onc    CODE: Full  "Code  DVT Ppx: Apixaban resumed 6/28  Diet/Fluids: As above.  Disposition: Pending improvement in above.    Pt's care was discussed with bedside RN and patient during Care Team Rounds.    Patient was discussed and evaluated with Dr. Mclain.    Kelly Smith  Internal Medicine PGY2  [phone]    ___________________________________________________________________    Subjective & Interval History:     Last 24 hr care team notes reviewed. Patient afebrile. Patient feels fatigued this morning. Breathing stable. No joint pain. No rashes. Swelling overall unchanged.     4 point ROS otherwise negative.    Medications: Reviewed in EPIC. List below for reference    Physical Exam:    Blood pressure (!) 88/65, pulse 117, temperature 98.1  F (36.7  C), temperature source Oral, resp. rate 18, height 1.48 m (4' 10.25\"), weight 74.7 kg (164 lb 9.6 oz), SpO2 94 %, not currently breastfeeding.    CONSTITUTIONAL:  Ms. Michel is sitting up in chair in no apparent distress.  HEENT: MMM. EOMI.   LUNGS: Normal respiratory effort without increased work of breathing. No wheezes appreciated. Diminished in bilateral bases.   CARDIOVASCULAR: Irregularly irregular, tachycardic w/ no M/R/G.   ABDOMEN: Soft, nondistended, NT, BS +ve.   MUSCULOSKELETAL: Moves all four extremities without difficulty. Diffuse anasarca.  NEURO: A&Ox3, CN II-XII grossly intact, non-focal.     Labs & Studies of Note: Reviewed in Epic  CMP    Recent Labs  Lab 07/14/17  0746 07/13/17  2017 07/13/17  0744 07/12/17  0641 07/11/17  0824  07/09/17  0758    135 137 134 136  < > 136   POTASSIUM 4.3 4.7 4.0 4.0 3.8  < > 3.7   CHLORIDE 101 101 102 99 101  < > 100   CO2 26 26 27 25 29  < > 28   ANIONGAP 9 9 8 10 6  < > 8   GLC 92 143* 105* 103* 94  < > 98   BUN 40* 37* 36* 28 24  < > 26   CR 1.09* 0.97 0.80 0.67 0.66  < > 0.62   GFRESTIMATED 52* 59* 74 >90Non  GFR Calc >90Non  GFR Calc  < > >90Non  GFR Calc   GFRESTBLACK 63 71 " 90 >90African American GFR Calc >90African American GFR Calc  < > >90African American GFR Calc   VIKTORIYA 8.6 8.1* 8.5 8.2* 8.4*  < > 8.3*   MAG  --   --  2.3 1.9 1.7  --  2.0   PHOS  --   --   --   --  3.1  --  2.7   ALBUMIN 2.5*  --   --   --   --   --   --    < > = values in this interval not displayed.    CBC    Recent Labs  Lab 07/14/17  0746 07/13/17 2017 07/12/17  0641 07/10/17  0546   WBC 2.2* 2.9* 2.1* 1.6*   RBC 2.53* 2.64* 2.87* 2.44*   HGB 7.8* 8.0* 8.9* 7.5*   HCT 24.0* 24.9* 27.1* 23.0*   PLT 39* 45* 51* 43*     INR    Recent Labs  Lab 07/10/17  0546 07/08/17  0914   INR 1.43* 1.68*     Medication List for Reference (delete if desired)  Current Facility-Administered Medications   Medication     sodium chloride (PF) 0.9% PF flush 3 mL     sodium chloride (PF) 0.9% PF flush 3 mL     May take oral meds with a sip of water, the morning of BRE procedure.     HOLD: Insulin - REGULAR AM of procedure IF diabetic     HOLD: Insulin - RAPID/SHORT acting AM of procedure IF diabetic     HOLD: Oral hypoglycemics AM of procedure IF diabetic     Give   of usual dose of LONG ACTING insulin AM of procedure IF diabetic     midodrine (PROAMATINE) tablet 10 mg     acetaminophen (TYLENOL) tablet 650 mg     potassium chloride SA (K-DUR/KLOR-CON M) CR tablet 40 mEq     miconazole (MICATIN; MICRO GUARD) 2 % powder     predniSONE (DELTASONE) tablet 7.5 mg     calcium carbonate (TUMS) chewable tablet 500 mg     pantoprazole (PROTONIX) EC tablet 40 mg     digoxin (LANOXIN) tablet 125 mcg     ondansetron (ZOFRAN) tablet 4 mg     prochlorperazine (COMPAZINE) injection 5-10 mg     ondansetron (ZOFRAN) injection 4 mg     simethicone (MYLICON) chewable tablet 80 mg     sodium chloride (PF) 0.9% PF flush 10-20 mL     sodium chloride (PF) 0.9% PF flush 10 mL     heparin lock flush 10 UNIT/ML injection 5-10 mL     dextrose 10 % 1,000 mL infusion     gabapentin (NEURONTIN) capsule 200 mg     melatonin tablet 3 mg     morphine 0.1% in solosite  topical gel 2 g     multivitamin, therapeutic with minerals (THERA-VIT-M) tablet 1 tablet     triamcinolone (KENALOG) 0.1 % ointment     lidocaine 1 % 1 mL     lidocaine (LMX4) kit     sodium chloride (PF) 0.9% PF flush 3 mL     sodium chloride (PF) 0.9% PF flush 3 mL     medication instruction     acetaminophen (TYLENOL) Suppository 650 mg     Patient is already receiving anticoagulation with heparin, enoxaparin (LOVENOX), warfarin (COUMADIN)  or other anticoagulant medication     glucose 40 % gel 15-30 g    Or     dextrose 50 % injection 25-50 mL    Or     glucagon injection 1 mg     naloxone (NARCAN) injection 0.1-0.4 mg     calcium-vitamin D (CALTRATE) 600-400 MG-UNIT per tablet 2 tablet     morphine 0.1% in solosite topical gel     potassium chloride SA (K-DUR/KLOR-CON M) CR tablet 20-40 mEq     potassium chloride (KLOR-CON) Packet 20-40 mEq     potassium chloride 10 mEq in 100 mL intermittent infusion     potassium chloride 10 mEq in 100 mL intermittent infusion with 10 mg lidocaine     potassium chloride 20 mEq in 50 mL intermittent infusion     magnesium sulfate 2 g in NS intermittent infusion (PharMEDium or FV Cmpd)     magnesium sulfate 4 g in 100 mL sterile water (premade)     sodium phosphate 10 mmol in D5W intermittent infusion     sodium phosphate 15 mmol in D5W intermittent infusion     sodium phosphate 20 mmol in D5W intermittent infusion     sodium phosphate 25 mmol in D5W intermittent infusion     melatonin tablet 1 mg     traMADol (ULTRAM) half-tab 25 mg

## 2017-07-15 ENCOUNTER — APPOINTMENT (OUTPATIENT)
Dept: PHYSICAL THERAPY | Facility: CLINIC | Age: 57
DRG: 871 | End: 2017-07-15
Attending: INTERNAL MEDICINE
Payer: COMMERCIAL

## 2017-07-15 LAB
ALBUMIN SERPL-MCNC: 2.5 G/DL (ref 3.4–5)
ALP SERPL-CCNC: 116 U/L (ref 40–150)
ALT SERPL W P-5'-P-CCNC: 34 U/L (ref 0–50)
ANION GAP SERPL CALCULATED.3IONS-SCNC: 8 MMOL/L (ref 3–14)
AST SERPL W P-5'-P-CCNC: 41 U/L (ref 0–45)
BACTERIA SPEC CULT: NORMAL
BASOPHILS # BLD AUTO: 0 10E9/L (ref 0–0.2)
BASOPHILS NFR BLD AUTO: 0.4 %
BILIRUB SERPL-MCNC: 0.9 MG/DL (ref 0.2–1.3)
BUN SERPL-MCNC: 42 MG/DL (ref 7–30)
CALCIUM SERPL-MCNC: 8.5 MG/DL (ref 8.5–10.1)
CHLORIDE SERPL-SCNC: 105 MMOL/L (ref 94–109)
CO2 SERPL-SCNC: 24 MMOL/L (ref 20–32)
CREAT SERPL-MCNC: 0.99 MG/DL (ref 0.52–1.04)
DIFFERENTIAL METHOD BLD: ABNORMAL
EOSINOPHIL # BLD AUTO: 0 10E9/L (ref 0–0.7)
EOSINOPHIL NFR BLD AUTO: 0.7 %
ERYTHROCYTE [DISTWIDTH] IN BLOOD BY AUTOMATED COUNT: 25.9 % (ref 10–15)
GFR SERPL CREATININE-BSD FRML MDRD: 58 ML/MIN/1.7M2
GLUCOSE SERPL-MCNC: 100 MG/DL (ref 70–99)
HCT VFR BLD AUTO: 24.9 % (ref 35–47)
HGB BLD-MCNC: 8.3 G/DL (ref 11.7–15.7)
IMM GRANULOCYTES # BLD: 0 10E9/L (ref 0–0.4)
IMM GRANULOCYTES NFR BLD: 0.4 %
LACTATE BLD-SCNC: 3.4 MMOL/L (ref 0.7–2.1)
LACTATE BLD-SCNC: 4 MMOL/L (ref 0.7–2.1)
LACTATE BLD-SCNC: 4.6 MMOL/L (ref 0.7–2.1)
LACTATE BLD-SCNC: 4.9 MMOL/L (ref 0.7–2.1)
LYMPHOCYTES # BLD AUTO: 0.6 10E9/L (ref 0.8–5.3)
LYMPHOCYTES NFR BLD AUTO: 20.2 %
Lab: NORMAL
MCH RBC QN AUTO: 31.9 PG (ref 26.5–33)
MCHC RBC AUTO-ENTMCNC: 33.3 G/DL (ref 31.5–36.5)
MCV RBC AUTO: 96 FL (ref 78–100)
MICRO REPORT STATUS: NORMAL
MONOCYTES # BLD AUTO: 0.5 10E9/L (ref 0–1.3)
MONOCYTES NFR BLD AUTO: 17 %
NEUTROPHILS # BLD AUTO: 1.7 10E9/L (ref 1.6–8.3)
NEUTROPHILS NFR BLD AUTO: 61.3 %
NRBC # BLD AUTO: 0 10*3/UL
NRBC BLD AUTO-RTO: 1 /100
PLATELET # BLD AUTO: 42 10E9/L (ref 150–450)
POTASSIUM SERPL-SCNC: 4.4 MMOL/L (ref 3.4–5.3)
PROT SERPL-MCNC: 5.3 G/DL (ref 6.8–8.8)
RBC # BLD AUTO: 2.6 10E12/L (ref 3.8–5.2)
SODIUM SERPL-SCNC: 136 MMOL/L (ref 133–144)
SPECIMEN SOURCE: NORMAL
WBC # BLD AUTO: 2.8 10E9/L (ref 4–11)

## 2017-07-15 PROCEDURE — 83605 ASSAY OF LACTIC ACID: CPT | Performed by: INTERNAL MEDICINE

## 2017-07-15 PROCEDURE — 25000132 ZZH RX MED GY IP 250 OP 250 PS 637

## 2017-07-15 PROCEDURE — 99232 SBSQ HOSP IP/OBS MODERATE 35: CPT | Mod: GC | Performed by: INTERNAL MEDICINE

## 2017-07-15 PROCEDURE — 25000131 ZZH RX MED GY IP 250 OP 636 PS 637: Performed by: STUDENT IN AN ORGANIZED HEALTH CARE EDUCATION/TRAINING PROGRAM

## 2017-07-15 PROCEDURE — 86638 Q FEVER ANTIBODY: CPT | Performed by: INTERNAL MEDICINE

## 2017-07-15 PROCEDURE — 25000125 ZZHC RX 250: Performed by: STUDENT IN AN ORGANIZED HEALTH CARE EDUCATION/TRAINING PROGRAM

## 2017-07-15 PROCEDURE — 80053 COMPREHEN METABOLIC PANEL: CPT | Performed by: INTERNAL MEDICINE

## 2017-07-15 PROCEDURE — 21400006 ZZH R&B CCU INTERMEDIATE UMMC

## 2017-07-15 PROCEDURE — 97530 THERAPEUTIC ACTIVITIES: CPT | Mod: GP

## 2017-07-15 PROCEDURE — 40000193 ZZH STATISTIC PT WARD VISIT

## 2017-07-15 PROCEDURE — P9047 ALBUMIN (HUMAN), 25%, 50ML: HCPCS | Performed by: STUDENT IN AN ORGANIZED HEALTH CARE EDUCATION/TRAINING PROGRAM

## 2017-07-15 PROCEDURE — 87040 BLOOD CULTURE FOR BACTERIA: CPT | Performed by: INTERNAL MEDICINE

## 2017-07-15 PROCEDURE — 36415 COLL VENOUS BLD VENIPUNCTURE: CPT | Performed by: INTERNAL MEDICINE

## 2017-07-15 PROCEDURE — 25000128 H RX IP 250 OP 636: Performed by: INTERNAL MEDICINE

## 2017-07-15 PROCEDURE — 25000128 H RX IP 250 OP 636: Performed by: STUDENT IN AN ORGANIZED HEALTH CARE EDUCATION/TRAINING PROGRAM

## 2017-07-15 PROCEDURE — 97110 THERAPEUTIC EXERCISES: CPT | Mod: GP

## 2017-07-15 PROCEDURE — 25000132 ZZH RX MED GY IP 250 OP 250 PS 637: Performed by: NURSE PRACTITIONER

## 2017-07-15 PROCEDURE — 85025 COMPLETE CBC W/AUTO DIFF WBC: CPT | Performed by: INTERNAL MEDICINE

## 2017-07-15 PROCEDURE — 83605 ASSAY OF LACTIC ACID: CPT | Performed by: PSYCHOLOGIST

## 2017-07-15 PROCEDURE — 86611 BARTONELLA ANTIBODY: CPT | Performed by: INTERNAL MEDICINE

## 2017-07-15 PROCEDURE — 97116 GAIT TRAINING THERAPY: CPT | Mod: GP

## 2017-07-15 PROCEDURE — 25000132 ZZH RX MED GY IP 250 OP 250 PS 637: Performed by: STUDENT IN AN ORGANIZED HEALTH CARE EDUCATION/TRAINING PROGRAM

## 2017-07-15 PROCEDURE — 25000132 ZZH RX MED GY IP 250 OP 250 PS 637: Performed by: INTERNAL MEDICINE

## 2017-07-15 PROCEDURE — P9047 ALBUMIN (HUMAN), 25%, 50ML: HCPCS | Performed by: INTERNAL MEDICINE

## 2017-07-15 RX ORDER — ALBUMIN (HUMAN) 12.5 G/50ML
50 SOLUTION INTRAVENOUS ONCE
Status: COMPLETED | OUTPATIENT
Start: 2017-07-15 | End: 2017-07-15

## 2017-07-15 RX ORDER — FOLIC ACID 1 MG/1
1 TABLET ORAL DAILY
Status: DISCONTINUED | OUTPATIENT
Start: 2017-07-15 | End: 2017-08-04 | Stop reason: HOSPADM

## 2017-07-15 RX ADMIN — SODIUM CHLORIDE 500 ML: 9 INJECTION, SOLUTION INTRAVENOUS at 12:32

## 2017-07-15 RX ADMIN — THIAMINE HCL (VITAMIN B1) 50 MG TABLET 50 MG: at 14:50

## 2017-07-15 RX ADMIN — PREDNISONE 7.5 MG: 5 TABLET ORAL at 08:47

## 2017-07-15 RX ADMIN — GABAPENTIN 200 MG: 100 CAPSULE ORAL at 20:19

## 2017-07-15 RX ADMIN — MICONAZOLE NITRATE: 2 POWDER TOPICAL at 08:45

## 2017-07-15 RX ADMIN — Medication 2 G: at 17:39

## 2017-07-15 RX ADMIN — CALCIUM CARBONATE 600 MG (1,500 MG)-VITAMIN D3 400 UNIT TABLET 2 TABLET: at 08:46

## 2017-07-15 RX ADMIN — DIGOXIN 125 MCG: 125 TABLET ORAL at 08:47

## 2017-07-15 RX ADMIN — ATENOLOL 50 MG: 50 TABLET ORAL at 20:19

## 2017-07-15 RX ADMIN — MIDODRINE HYDROCHLORIDE 10 MG: 5 TABLET ORAL at 08:46

## 2017-07-15 RX ADMIN — GABAPENTIN 200 MG: 100 CAPSULE ORAL at 08:47

## 2017-07-15 RX ADMIN — MULTIPLE VITAMINS W/ MINERALS TAB 1 TABLET: TAB at 08:46

## 2017-07-15 RX ADMIN — FOLIC ACID 1 MG: 1 TABLET ORAL at 14:50

## 2017-07-15 RX ADMIN — PANTOPRAZOLE SODIUM 40 MG: 40 TABLET, DELAYED RELEASE ORAL at 06:05

## 2017-07-15 RX ADMIN — ALBUMIN (HUMAN) 50 G: 12.5 SOLUTION INTRAVENOUS at 23:30

## 2017-07-15 RX ADMIN — ACETAMINOPHEN 650 MG: 325 TABLET, FILM COATED ORAL at 06:05

## 2017-07-15 RX ADMIN — MIDODRINE HYDROCHLORIDE 10 MG: 5 TABLET ORAL at 12:13

## 2017-07-15 RX ADMIN — ALBUMIN (HUMAN) 50 G: 12.5 SOLUTION INTRAVENOUS at 19:03

## 2017-07-15 RX ADMIN — MIDODRINE HYDROCHLORIDE 10 MG: 5 TABLET ORAL at 17:38

## 2017-07-15 RX ADMIN — GABAPENTIN 200 MG: 100 CAPSULE ORAL at 14:50

## 2017-07-15 RX ADMIN — ATENOLOL 50 MG: 50 TABLET ORAL at 08:47

## 2017-07-15 NOTE — PROGRESS NOTES
Cardiology Daily Note   Date of Service: 7/9/2017  Patient: Amelia Michel  MRN: [id number]  Admission Date: 6/19/2017  Hospital Day # 26    Assessment & Plan:   Amelia Michel is a 56 year old female with PMHx of VSD s/p repair (1969), RA, recently diagnosed atrial fibrillation that was complicated with an OOH cardiac arrest (felt 2/2 long pause with degeneration to VFib while converting Afib to SR with multiple AV prieto agents) who was readmitted from ARU on 6/19 with fever and concern for worsening LUE infection. She is now transferred back to cardiology service as primary on 6/26 for further workup and evaluation of recurrent lactic acidosis, persistent afib and softer blood pressures with tachypnea.     # Volume overload/anasarca:  # HFrEF (EF 50-55%):  # Hypotension:  Patient initially up 35# at admission; diuresis initially challenging, secondary to third spacing of fluid (albumin 1.9), but briskly responded with addition of midodrine and ongoing albumin resuscitation. Right heart cath 6/26 with mildly elevated right and left sided filling presures  - Holding diuresis 2/2 CELSA   - Continue midodrine to 10 mg TID     # CELSA, improving  Likely prerenal with combination of diuresis and NSAID use for FUO. Also consider contrast injury given CT chest yesterday   - D/C naproxen   - Holding diuresis  - NS bolus     # Atrial fibrillation/flutter with RVR:  RVR in setting of volume overload above. Patient rec'd single dose of diltiazem with associated soft BPs.   - Continue dig 125mcg  - Atenolol 50 mg BID    - Not anticoagulated due to thrombocytopenia    # Out of hospital arrest:  Pt will need to wear LifeVest until medically appropriate and cleared from infection/wound standpoint for ICD implantation likely 4-6 weeks after discharge per EP (pending hospital course for LUE wound)    # Fever of unknown origin  Previously attributes to LUE wound, though patient has continued to have fevers despite broad  spectrum antibiotics. ID consulted and feels that unlikely secondary to infection. Discontinued antibiotics with close monitoring. Patient without DVT, no indolent nidus of infection noted on MRI spine, LUE wound continues to improve without antibiotics. Ct chest 7/13/17 with evidence of pneumonia with elevated procalcitonin. BRE with vegetations raises possibility of endocarditis, but only 1 major/2 minor Duke's criteria. Given size and history of RA, also possible Libman-Sacks endocarditis.    - Patient neutropenic, not to degree to warrant antibiotics for fever at present  - Heme/onc following, appreciate recs  - Bmbx results from 7/12/17 pending  - ID consultation, appreciate recommendations      # Acute on Chronic Thrombocytopenia:   Plts noted to decrease from 104 to as low as 15 during recent hospitalization requiring transfusion w/ chronically low in the 's as outpatient. Possible bactrim was offending agent for exacerbation, and discontinued this medication (exchanged for meropenem in the setting of worsening cellulitis).   - If this is chronic ITP, no need for treatment unless plts drift < 10s if not bleeding  - Continue to hold RA meds for now  - Holding apixiban (last dose 7/8/17)    # Acute on chronic anemia: Stable  No evidence of overt bleeding.   - Daily CBC    # RA:   History of seropositive rheumatoid arthritis (anti-CCP positive) since late 1980;s w/ multiple MTP osteotomies, partial right wrist fusion, longstanding therapy consisting of MTX, prednisone and HCQ  - Continue with Prednisone taper. Now on 7.5 mg on 7/1 then to 5 mg daily after 2 weeks if continues to have low disease activity  - Rheumatology following  - Continue to hold HCQ and MTX until outpatient follow-up  - Follow-up with [facility] Dr. Conor Cunha in 4-6 weeks after discharge      # Severe Critical Illness Myopathy:   Significant deconditioning w/ median neuropathies most likely localized to the wrists  "and ulnar neuropathies most likely localized to the elbows secondary to RA.  - Ongoing extensive PT/OT/SLP    Consulting Services: Nutrition, heme/onc, ID, rheumatology    CODE: Full Code  DVT Ppx: Apixaban resumed 6/28  Diet/Fluids: As above.  Disposition: Pending improvement in above.    Pt's care was discussed with bedside RN and patient during Care Team Rounds.    Patient was discussed and evaluated with Dr. Mclain.    Kelly Smith  Internal Medicine PGY2  [phone]    ___________________________________________________________________    Subjective & Interval History:   Last 24 hr care team notes reviewed. Patient afebrile. No fevers, chills. No cough. No chest/abdominal pain. No dysuria. No diarrhea. No rashes.    4 point ROS otherwise negative.    Medications: Reviewed in EPIC. List below for reference    Physical Exam:    Blood pressure 110/68, pulse 108, temperature 97.4  F (36.3  C), temperature source Oral, resp. rate 17, height 1.48 m (4' 10.25\"), weight 75.7 kg (166 lb 12.8 oz), SpO2 95 %, not currently breastfeeding.    CONSTITUTIONAL:  Ms. Michel is sitting up in chair in no apparent distress.  HEENT: MMM. EOMI.   LUNGS: Normal respiratory effort without increased work of breathing. No wheezes appreciated. Diminished in bilateral bases.   CARDIOVASCULAR: Irregularly irregular, tachycardic w/ no M/R/G.   ABDOMEN: Soft, nondistended, NT, BS +ve.   MUSCULOSKELETAL: Moves all four extremities without difficulty. Diffuse anasarca.  NEURO: A&Ox3, CN II-XII grossly intact, non-focal.     Labs & Studies of Note: Reviewed in Epic  CMP    Recent Labs  Lab 07/15/17  0803 07/14/17  0746 07/13/17 2017 07/13/17  0744 07/12/17  0641 07/11/17  0824  07/09/17  0758    136 135 137 134 136  < > 136   POTASSIUM 4.4 4.3 4.7 4.0 4.0 3.8  < > 3.7   CHLORIDE 105 101 101 102 99 101  < > 100   CO2 24 26 26 27 25 29  < > 28   ANIONGAP 8 9 9 8 10 6  < > 8   * 92 143* 105* 103* 94  < > 98   BUN 42* 40* 37* 36* 28 " 24  < > 26   CR 0.99 1.09* 0.97 0.80 0.67 0.66  < > 0.62   GFRESTIMATED 58* 52* 59* 74 >90Non  GFR Calc >90Non  GFR Calc  < > >90Non  GFR Calc   GFRESTBLACK 70 63 71 90 >90African American GFR Calc >90African American GFR Calc  < > >90African American GFR Calc   VIKTORIYA 8.5 8.6 8.1* 8.5 8.2* 8.4*  < > 8.3*   MAG  --   --   --  2.3 1.9 1.7  --  2.0   PHOS  --   --   --   --   --  3.1  --  2.7   PROTTOTAL 5.3*  --   --   --   --   --   --   --    ALBUMIN 2.5* 2.5*  --   --   --   --   --   --    BILITOTAL 0.9  --   --   --   --   --   --   --    ALKPHOS 116  --   --   --   --   --   --   --    AST 41  --   --   --   --   --   --   --    ALT 34  --   --   --   --   --   --   --    < > = values in this interval not displayed.    CBC    Recent Labs  Lab 07/15/17  0803 07/14/17  0746 07/13/17 2017 07/12/17  0641   WBC 2.8* 2.2* 2.9* 2.1*   RBC 2.60* 2.53* 2.64* 2.87*   HGB 8.3* 7.8* 8.0* 8.9*   HCT 24.9* 24.0* 24.9* 27.1*   PLT 42* 39* 45* 51*     INR    Recent Labs  Lab 07/10/17  0546   INR 1.43*     Medication List for Reference (delete if desired)  Current Facility-Administered Medications   Medication     0.9% sodium chloride BOLUS     atenolol (TENORMIN) tablet 50 mg     midodrine (PROAMATINE) tablet 10 mg     miconazole (MICATIN; MICRO GUARD) 2 % powder     predniSONE (DELTASONE) tablet 7.5 mg     calcium carbonate (TUMS) chewable tablet 500 mg     pantoprazole (PROTONIX) EC tablet 40 mg     digoxin (LANOXIN) tablet 125 mcg     ondansetron (ZOFRAN) tablet 4 mg     prochlorperazine (COMPAZINE) injection 5-10 mg     ondansetron (ZOFRAN) injection 4 mg     simethicone (MYLICON) chewable tablet 80 mg     sodium chloride (PF) 0.9% PF flush 10-20 mL     sodium chloride (PF) 0.9% PF flush 10 mL     heparin lock flush 10 UNIT/ML injection 5-10 mL     dextrose 10 % 1,000 mL infusion     gabapentin (NEURONTIN) capsule 200 mg     melatonin tablet 3 mg     morphine 0.1% in solosite  topical gel 2 g     multivitamin, therapeutic with minerals (THERA-VIT-M) tablet 1 tablet     lidocaine 1 % 1 mL     lidocaine (LMX4) kit     sodium chloride (PF) 0.9% PF flush 3 mL     medication instruction     acetaminophen (TYLENOL) Suppository 650 mg     Patient is already receiving anticoagulation with heparin, enoxaparin (LOVENOX), warfarin (COUMADIN)  or other anticoagulant medication     glucose 40 % gel 15-30 g    Or     dextrose 50 % injection 25-50 mL    Or     glucagon injection 1 mg     naloxone (NARCAN) injection 0.1-0.4 mg     calcium-vitamin D (CALTRATE) 600-400 MG-UNIT per tablet 2 tablet     morphine 0.1% in solosite topical gel     potassium chloride SA (K-DUR/KLOR-CON M) CR tablet 20-40 mEq     potassium chloride (KLOR-CON) Packet 20-40 mEq     potassium chloride 10 mEq in 100 mL intermittent infusion     potassium chloride 10 mEq in 100 mL intermittent infusion with 10 mg lidocaine     potassium chloride 20 mEq in 50 mL intermittent infusion     magnesium sulfate 2 g in NS intermittent infusion (PharMEDium or FV Cmpd)     magnesium sulfate 4 g in 100 mL sterile water (premade)     sodium phosphate 10 mmol in D5W intermittent infusion     sodium phosphate 15 mmol in D5W intermittent infusion     sodium phosphate 20 mmol in D5W intermittent infusion     sodium phosphate 25 mmol in D5W intermittent infusion     melatonin tablet 1 mg     traMADol (ULTRAM) half-tab 25 mg

## 2017-07-15 NOTE — PLAN OF CARE
Problem: Sepsis (Adult)  Goal: Signs and Symptoms of Listed Potential Problems Will be Absent or Manageable (Sepsis)  Signs and symptoms of listed potential problems will be absent or manageable by discharge/transition of care (reference Sepsis (Adult) CPG).   Outcome: No Change  Tachy 110's. Afib/flutter. Afebrile. 2L NC with activity otherwise RA. MONTALVO. walked in halls with walker x 2. Lactic acid 4.9- bolus given. Recheck pending. Alert and oriented. Left arm extravistion wound- dressing changed. Bilateral LE edema TEDs on. Incontinent of urine. BM x 1.

## 2017-07-15 NOTE — PLAN OF CARE
Problem: Individualization  Goal: Patient Preferences  D: Pt stable and comfortable. AVSS, afebrile. No pain or shortness of breath noted. Incontinent of urine. Turned Q2hrs.  I: Monitored/assessed pt. Assisted with cares.  A: Pt stable and comfortable.  P: Continue to monitor/assess pt, contact provider with concerns.

## 2017-07-15 NOTE — PLAN OF CARE
Problem: Goal Outcome Summary  Goal: Goal Outcome Summary   OT 6C Cx Pt with other providers and fatigued.

## 2017-07-15 NOTE — PLAN OF CARE
Problem: Goal Outcome Summary  Goal: Goal Outcome Summary  1. Pt will be free of fever/chills   PT 6C: patient with increased SOB during session, on 2L O2 with sats >92% throughout all activity. Ambulates ~100 feet x2 with SBA and use of FWW, standing rests required secondary to SOB. Engaged in LE strengthening exercises.      Encourage ambulation with nursing staff 2-3 times per day, as tolerated, with use of FWW.      Recommend to ARU to continue increasing tolerance to activity for greater independence with overall functional mobility. Patient presently below baseline requiring assist with bed mobility and assistive device for ambulation, remains very motivated and willing to participate with therapies.

## 2017-07-15 NOTE — PLAN OF CARE
D:pt has increase swelling on right leg, pt  brought in larger right komal hose for leg  A:both komal's fit comfortably  P:per primary

## 2017-07-15 NOTE — PROGRESS NOTES
[facility], [place]    Sepsis Evaluation Progress Note    Date of Service: 07/15/2017    I was called to see Amelia Michel due to abnormal vital signs triggering the Sepsis SIRS screening alert. She is not known to have an infection.     Physical Exam    Vital Signs:  Temp: 98.3  F (36.8  C) Temp src: Oral BP: 116/82 Pulse: 108 Heart Rate: 110 Resp: 17 SpO2: 97 % O2 Device: Nasal cannula Oxygen Delivery: 2 LPM    Lab:  Lactic Acid   Date Value Ref Range Status   07/15/2017 4.9 (HH) 0.7 - 2.1 mmol/L Final     Comment:     Critical Value called to and read back by  SONY MANUEL RN 6C ON 7/15/2017 AT 1220 BY ANM.         The patient is at baseline mental status.    The rest of their physical exam is significant for  CV: Irregularly, irregular rhythm + tachycardia without murmurs, gallops, rubs.   Respiratory: Normal respiratory effort on oxygen. Diminished breath sounds in bilateral bases. No crackles, no wheezes in any field.   Extremities: Warm, well perfused. No cyanosis. Brisk pulses.   GI: Normoactive bowel sounds. Nontender to palpation.     Assessment and Plan    The SIRS and exam findings are likely due to sepsis.     ID: The patient is currently on the following antibiotics:  Anti-infectives     None        Current antibiotic coverage no antibiotics are indicated at this time as there are no clinical signs of infection.    Fluid: Fluid bolus ordered.    Lab: Repeat lactic acid ordered for 1 hour from now.    Disposition: The patient will remain on the current unit. We will continue to monitor this patient closely.    Kelly Smith MD

## 2017-07-15 NOTE — PROGRESS NOTES
Lactic recheck 4.6. Rapid called. MD aware. 97% 2L NC. SOB. Having increased difficulty breathing.

## 2017-07-15 NOTE — PROVIDER NOTIFICATION
07/15/17 1310   Call Information   Date of Call 07/15/17   Time of Call 1310   Name of person requesting the team Kaleigh   Title of person requesting team RN   RRT Arrival time 1310   Time RRT ended 1325   Reason for call   Type of RRT Adult   Primary reason for call Sepsis suspected   Sepsis Suspected Elevated Lactate level   Was patient transferred from the ED, ICU, or PACU within last 24 hours prior to RRT call? No   SBAR   Situation Lactic acid 4.9.   Background Infection of the arm, ID following, vegetation on valve. All cultures negative.    Notable History/Conditions Cardiac   Assessment Reports SOB with PT, otherwise eupneic. Skin warm/dry. VS WDL. Afebrile. Antibiotics discontiued yesterday by ID. See their note.   Interventions Fluid bolus;Labs   Patient Outcome   Patient Outcome Stabilized on unit   RRT Team   Attending/Primary/Covering Physician CARDS 1   Date Attending Physician notified 07/15/17   Time Attending Physician notified 1310   Physician(s) Kelly Smith MD   Lead RN Amelia Poloa   RT Wu   Post RRT Intervention Assessment   Post RRT Assessment Stable/Improved   Date Follow Up Done 07/15/17   Time Follow Up Done 1505   Comments Awaiting lactic acid redraw. Otherwise, status remains unchanged per bedside nurse and patient. Will continue to follow until lactic acid is rechecked.

## 2017-07-16 ENCOUNTER — APPOINTMENT (OUTPATIENT)
Dept: GENERAL RADIOLOGY | Facility: CLINIC | Age: 57
DRG: 871 | End: 2017-07-16
Attending: INTERNAL MEDICINE
Payer: COMMERCIAL

## 2017-07-16 ENCOUNTER — APPOINTMENT (OUTPATIENT)
Dept: PHYSICAL THERAPY | Facility: CLINIC | Age: 57
DRG: 871 | End: 2017-07-16
Attending: INTERNAL MEDICINE
Payer: COMMERCIAL

## 2017-07-16 LAB
ABO + RH BLD: NORMAL
ABO + RH BLD: NORMAL
ALBUMIN SERPL-MCNC: 3.8 G/DL (ref 3.4–5)
ALP SERPL-CCNC: 127 U/L (ref 40–150)
ALT SERPL W P-5'-P-CCNC: 37 U/L (ref 0–50)
ANION GAP SERPL CALCULATED.3IONS-SCNC: 8 MMOL/L (ref 3–14)
AST SERPL W P-5'-P-CCNC: 56 U/L (ref 0–45)
BACTERIA SPEC CULT: NO GROWTH
BACTERIA SPEC CULT: NO GROWTH
BASOPHILS # BLD AUTO: 0 10E9/L (ref 0–0.2)
BASOPHILS NFR BLD AUTO: 0.5 %
BILIRUB SERPL-MCNC: 1.7 MG/DL (ref 0.2–1.3)
BLD GP AB SCN SERPL QL: NORMAL
BLD PROD TYP BPU: NORMAL
BLD PROD TYP BPU: NORMAL
BLD UNIT ID BPU: 0
BLOOD BANK CMNT PATIENT-IMP: NORMAL
BLOOD PRODUCT CODE: NORMAL
BPU ID: NORMAL
BUN SERPL-MCNC: 37 MG/DL (ref 7–30)
CALCIUM SERPL-MCNC: 9.1 MG/DL (ref 8.5–10.1)
CHLORIDE SERPL-SCNC: 104 MMOL/L (ref 94–109)
CO2 SERPL-SCNC: 26 MMOL/L (ref 20–32)
CREAT SERPL-MCNC: 0.85 MG/DL (ref 0.52–1.04)
DIFFERENTIAL METHOD BLD: ABNORMAL
EOSINOPHIL # BLD AUTO: 0 10E9/L (ref 0–0.7)
EOSINOPHIL NFR BLD AUTO: 1 %
ERYTHROCYTE [DISTWIDTH] IN BLOOD BY AUTOMATED COUNT: 26.5 % (ref 10–15)
GFR SERPL CREATININE-BSD FRML MDRD: 69 ML/MIN/1.7M2
GLUCOSE SERPL-MCNC: 85 MG/DL (ref 70–99)
HCT VFR BLD AUTO: 20.6 % (ref 35–47)
HGB BLD-MCNC: 6.6 G/DL (ref 11.7–15.7)
IMM GRANULOCYTES # BLD: 0 10E9/L (ref 0–0.4)
IMM GRANULOCYTES NFR BLD: 1 %
INR PPP: 1.41 (ref 0.86–1.14)
LACTATE BLD-SCNC: 2.2 MMOL/L (ref 0.7–2.1)
LDH SERPL L TO P-CCNC: 191 U/L (ref 81–234)
LYMPHOCYTES # BLD AUTO: 0.3 10E9/L (ref 0.8–5.3)
LYMPHOCYTES NFR BLD AUTO: 14.7 %
MCH RBC QN AUTO: 31.1 PG (ref 26.5–33)
MCHC RBC AUTO-ENTMCNC: 32 G/DL (ref 31.5–36.5)
MCV RBC AUTO: 97 FL (ref 78–100)
MICRO REPORT STATUS: NORMAL
MICRO REPORT STATUS: NORMAL
MONOCYTES # BLD AUTO: 0.4 10E9/L (ref 0–1.3)
MONOCYTES NFR BLD AUTO: 20.6 %
NEUTROPHILS # BLD AUTO: 1.3 10E9/L (ref 1.6–8.3)
NEUTROPHILS NFR BLD AUTO: 62.2 %
NRBC # BLD AUTO: 0 10*3/UL
NRBC BLD AUTO-RTO: 1 /100
NUM BPU REQUESTED: 1
PLATELET # BLD AUTO: 36 10E9/L (ref 150–450)
POTASSIUM SERPL-SCNC: 4 MMOL/L (ref 3.4–5.3)
POTASSIUM SERPL-SCNC: 4.1 MMOL/L (ref 3.4–5.3)
PROT SERPL-MCNC: 5.9 G/DL (ref 6.8–8.8)
RBC # BLD AUTO: 2.12 10E12/L (ref 3.8–5.2)
RETICS # AUTO: 69.3 10E9/L (ref 25–95)
RETICS/RBC NFR AUTO: 3.3 % (ref 0.5–2)
SODIUM SERPL-SCNC: 138 MMOL/L (ref 133–144)
SPECIMEN EXP DATE BLD: NORMAL
SPECIMEN SOURCE: NORMAL
SPECIMEN SOURCE: NORMAL
TRANSFUSION STATUS PATIENT QL: NORMAL
TRANSFUSION STATUS PATIENT QL: NORMAL
WBC # BLD AUTO: 2 10E9/L (ref 4–11)

## 2017-07-16 PROCEDURE — 21400006 ZZH R&B CCU INTERMEDIATE UMMC

## 2017-07-16 PROCEDURE — 25000132 ZZH RX MED GY IP 250 OP 250 PS 637: Performed by: INTERNAL MEDICINE

## 2017-07-16 PROCEDURE — 86901 BLOOD TYPING SEROLOGIC RH(D): CPT | Performed by: INTERNAL MEDICINE

## 2017-07-16 PROCEDURE — 86923 COMPATIBILITY TEST ELECTRIC: CPT | Performed by: INTERNAL MEDICINE

## 2017-07-16 PROCEDURE — 85610 PROTHROMBIN TIME: CPT | Performed by: INTERNAL MEDICINE

## 2017-07-16 PROCEDURE — 83615 LACTATE (LD) (LDH) ENZYME: CPT | Performed by: INTERNAL MEDICINE

## 2017-07-16 PROCEDURE — 99232 SBSQ HOSP IP/OBS MODERATE 35: CPT | Mod: GC | Performed by: INTERNAL MEDICINE

## 2017-07-16 PROCEDURE — 25000131 ZZH RX MED GY IP 250 OP 636 PS 637: Performed by: STUDENT IN AN ORGANIZED HEALTH CARE EDUCATION/TRAINING PROGRAM

## 2017-07-16 PROCEDURE — 40000193 ZZH STATISTIC PT WARD VISIT

## 2017-07-16 PROCEDURE — 25000132 ZZH RX MED GY IP 250 OP 250 PS 637: Performed by: NURSE PRACTITIONER

## 2017-07-16 PROCEDURE — 25000132 ZZH RX MED GY IP 250 OP 250 PS 637: Performed by: STUDENT IN AN ORGANIZED HEALTH CARE EDUCATION/TRAINING PROGRAM

## 2017-07-16 PROCEDURE — 85025 COMPLETE CBC W/AUTO DIFF WBC: CPT | Performed by: INTERNAL MEDICINE

## 2017-07-16 PROCEDURE — P9016 RBC LEUKOCYTES REDUCED: HCPCS | Performed by: INTERNAL MEDICINE

## 2017-07-16 PROCEDURE — 80053 COMPREHEN METABOLIC PANEL: CPT | Performed by: INTERNAL MEDICINE

## 2017-07-16 PROCEDURE — 86900 BLOOD TYPING SEROLOGIC ABO: CPT | Performed by: INTERNAL MEDICINE

## 2017-07-16 PROCEDURE — 36415 COLL VENOUS BLD VENIPUNCTURE: CPT | Performed by: INTERNAL MEDICINE

## 2017-07-16 PROCEDURE — 25000125 ZZHC RX 250: Performed by: STUDENT IN AN ORGANIZED HEALTH CARE EDUCATION/TRAINING PROGRAM

## 2017-07-16 PROCEDURE — 71010 XR CHEST PORT 1 VW: CPT

## 2017-07-16 PROCEDURE — 40000611 ZZHCL STATISTIC MORPHOLOGY W/INTERP HEMEPATH TC 85060: Performed by: INTERNAL MEDICINE

## 2017-07-16 PROCEDURE — 25000132 ZZH RX MED GY IP 250 OP 250 PS 637

## 2017-07-16 PROCEDURE — 85045 AUTOMATED RETICULOCYTE COUNT: CPT | Performed by: INTERNAL MEDICINE

## 2017-07-16 PROCEDURE — 86850 RBC ANTIBODY SCREEN: CPT | Performed by: INTERNAL MEDICINE

## 2017-07-16 PROCEDURE — 25000128 H RX IP 250 OP 636: Performed by: INTERNAL MEDICINE

## 2017-07-16 PROCEDURE — 84132 ASSAY OF SERUM POTASSIUM: CPT | Performed by: INTERNAL MEDICINE

## 2017-07-16 PROCEDURE — 97530 THERAPEUTIC ACTIVITIES: CPT | Mod: GP

## 2017-07-16 PROCEDURE — 83605 ASSAY OF LACTIC ACID: CPT | Performed by: STUDENT IN AN ORGANIZED HEALTH CARE EDUCATION/TRAINING PROGRAM

## 2017-07-16 RX ORDER — ACETAMINOPHEN 325 MG/1
650 TABLET ORAL EVERY 4 HOURS PRN
Status: DISCONTINUED | OUTPATIENT
Start: 2017-07-16 | End: 2017-08-04 | Stop reason: HOSPADM

## 2017-07-16 RX ORDER — FUROSEMIDE 10 MG/ML
40 INJECTION INTRAMUSCULAR; INTRAVENOUS ONCE
Status: COMPLETED | OUTPATIENT
Start: 2017-07-16 | End: 2017-07-16

## 2017-07-16 RX ORDER — PREDNISONE 5 MG/1
5 TABLET ORAL DAILY
Status: DISCONTINUED | OUTPATIENT
Start: 2017-07-17 | End: 2017-07-27

## 2017-07-16 RX ADMIN — ACETAMINOPHEN 650 MG: 325 TABLET, FILM COATED ORAL at 06:14

## 2017-07-16 RX ADMIN — ATENOLOL 50 MG: 50 TABLET ORAL at 08:09

## 2017-07-16 RX ADMIN — GABAPENTIN 200 MG: 100 CAPSULE ORAL at 08:08

## 2017-07-16 RX ADMIN — DIGOXIN 125 MCG: 125 TABLET ORAL at 08:09

## 2017-07-16 RX ADMIN — MIDODRINE HYDROCHLORIDE 10 MG: 5 TABLET ORAL at 08:10

## 2017-07-16 RX ADMIN — POTASSIUM CHLORIDE 20 MEQ: 750 TABLET, EXTENDED RELEASE ORAL at 23:46

## 2017-07-16 RX ADMIN — MICONAZOLE NITRATE: 2 POWDER TOPICAL at 08:12

## 2017-07-16 RX ADMIN — PREDNISONE 7.5 MG: 5 TABLET ORAL at 08:09

## 2017-07-16 RX ADMIN — THIAMINE HCL (VITAMIN B1) 50 MG TABLET 50 MG: at 08:26

## 2017-07-16 RX ADMIN — FOLIC ACID 1 MG: 1 TABLET ORAL at 08:09

## 2017-07-16 RX ADMIN — MULTIPLE VITAMINS W/ MINERALS TAB 1 TABLET: TAB at 08:10

## 2017-07-16 RX ADMIN — MIDODRINE HYDROCHLORIDE 10 MG: 5 TABLET ORAL at 13:36

## 2017-07-16 RX ADMIN — GABAPENTIN 200 MG: 100 CAPSULE ORAL at 13:36

## 2017-07-16 RX ADMIN — GABAPENTIN 200 MG: 100 CAPSULE ORAL at 20:11

## 2017-07-16 RX ADMIN — ATENOLOL 50 MG: 50 TABLET ORAL at 20:11

## 2017-07-16 RX ADMIN — CALCIUM CARBONATE 600 MG (1,500 MG)-VITAMIN D3 400 UNIT TABLET 2 TABLET: at 08:11

## 2017-07-16 RX ADMIN — PANTOPRAZOLE SODIUM 40 MG: 40 TABLET, DELAYED RELEASE ORAL at 06:14

## 2017-07-16 RX ADMIN — MIDODRINE HYDROCHLORIDE 10 MG: 5 TABLET ORAL at 17:57

## 2017-07-16 RX ADMIN — FUROSEMIDE 40 MG: 10 INJECTION, SOLUTION INTRAVENOUS at 08:10

## 2017-07-16 RX ADMIN — Medication: at 16:00

## 2017-07-16 RX ADMIN — MICONAZOLE NITRATE: 2 POWDER TOPICAL at 20:12

## 2017-07-16 NOTE — PROVIDER NOTIFICATION
07/15/17 1611   Call Information   Date of Call 07/15/17   Time of Call 1609   Name of person requesting the team Agnes   Title of person requesting team RN   RRT Arrival time 1611   Time RRT ended 1621   Reason for call   Type of RRT Adult   Primary reason for call Sepsis suspected   Sepsis Suspected Elevated Lactate level   Was patient transferred from the ED, ICU, or PACU within last 24 hours prior to RRT call? No   SBAR   Situation Lactic Acid follow up 4.6   Background Infection of the arm, ID following, vegetation on valve. All cultures negative.    Notable History/Conditions Cardiac   Assessment RN and patient report no change from 1310 assessment   Interventions Other (describe)  (Albumin, patient is fluid up.)   Patient Outcome   Patient Outcome Stabilized on unit   RRT Team   Attending/Primary/Covering Physician CARDS 1   Date Attending Physician notified 07/15/17   Time Attending Physician notified 1609   Physician(s) Mike Alicia   Lead RN Amelia KNOTT RN Agnes   RT Bryce   Post RRT Intervention Assessment   Post RRT Assessment Stable/Improved   Date Follow Up Done 07/15/17   Time Follow Up Done 1800   Comments Awaiting albumin from pharmacy and then will schedule follow up lactic acid. Per primary nurse, no changes in assessment.

## 2017-07-16 NOTE — PROGRESS NOTES
Cardiology Daily Note   Date of Service: 7/9/2017  Patient: Amelia Michel  MRN: [id number]  Admission Date: 6/19/2017  Hospital Day # 27    Assessment & Plan:   Amelia Michel is a 56 year old female with PMHx of VSD s/p repair (1969), RA, recently diagnosed atrial fibrillation that was complicated with an OOH cardiac arrest (felt 2/2 long pause with degeneration to VFib while converting Afib to SR with multiple AV prieto agents) who was readmitted from ARU on 6/19 with fever and concern for worsening LUE infection. She is now transferred back to cardiology service as primary on 6/26 for further workup and evaluation of recurrent lactic acidosis, persistent afib and softer blood pressures with tachypnea.     # Fever of unknown origin, last fever 7/12/17  Previously attributed to LUE wound, though wound has continued to improve without surrounding appearance of cellulitis. Discontinued antibiotics with close monitoring. Summary of work-up -- no DVT on ultrasound 7/5/17, MRI of complete spine without nidus of infection 7/6/17, BRE with vegetations raised possibility of endocarditis, but only 1 major/2 minor Duke's criteria, does not have definitively. Serologies for coxiella and bartonella are in process. Bmbx results in process. CT chest 7/13/17 with possible PNA and elevated procalcitonin, but without symptoms or hemodynamic instability.   - Patient neutropenic, not to degree to warrant antibiotics for fever at present  - Heme/onc following, appreciate recs  - ID consultation, appreciate recommendations  - Thiamine     # Volume overload/anasarca:  # HFrEF (EF 50-55%):  # Hypotension:  Patient initially up 35# at admission; diuresis challenging, secondary to third spacing of fluid (albumin 1.9), but responded with addition of midodrine and ongoing albumin resuscitation. Right heart cath 6/26 with mildly elevated right and left sided filling presures  - Intermittent IV furosemide, goal 1.5 L negative daily    - Continue midodrine to 10 mg TID     # CELSA, improving  Likely prerenal with combination of diuresis and NSAID use for FUO. Also consider contrast injury given CT chest.   - D/C naproxen     # Atrial fibrillation/flutter with RVR:  RVR in setting of volume overload above. Patient rec'd single dose of diltiazem with associated soft BPs.   - Continue dig 125mcg  - Atenolol 50 mg BID    - Not anticoagulated due to thrombocytopenia    # Out of hospital arrest:  Pt will need to wear LifeVest until medically appropriate and cleared from infection/wound standpoint for ICD implantation likely 4-6 weeks after discharge per EP (pending hospital course for LUE wound).       # Acute on Chronic Thrombocytopenia:   Plts noted to decrease from 104 to as low as 15 during recent hospitalization requiring transfusion w/ chronically low in the 's as outpatient. Possible bactrim was offending agent for exacerbation, and discontinued this medication (exchanged for meropenem in the setting of worsening cellulitis).   - If this is chronic ITP, no need for treatment unless plts drift < 10s if not bleeding  - Continue to hold RA meds for now  - Holding apixiban (last dose 7/8/17)    # Acute on chronic anemia: Stable  No evidence of overt bleeding. Hgb 6.6 on 7/16/17 likely dilutional with all lines down and ongoing resuscitation with albumin, NS.   - Daily CBC  - 1 U RBCs 7/16/17    # RA:   History of seropositive rheumatoid arthritis (anti-CCP positive) since late 1980;s w/ multiple MTP osteotomies, partial right wrist fusion, longstanding therapy consisting of MTX, prednisone and HCQ  - Tapered to 5 mg prednisone on 7/17 with continued monitoring for flaring  - Rheumatology following  - Continue to hold HCQ and MTX until outpatient follow-up  - Follow[facility]up with [facility] Dr. Conor Cunha in 4-6 weeks after discharge      # Severe Critical Illness Myopathy:   Significant deconditioning w/ median neuropathies most  "likely localized to the wrists and ulnar neuropathies most likely localized to the elbows secondary to RA.  - Ongoing extensive PT/OT/SLP    Consulting Services: Nutrition, heme/onc, ID, rheumatology    CODE: Full Code  DVT Ppx: Apixaban resumed 6/28  Diet/Fluids: As above.  Disposition: Pending improvement in above.    Pt's care was discussed with bedside RN and patient during Care Team Rounds.    Patient was discussed and evaluated with Dr. Mclain.    Kelly Smith  Internal Medicine PGY2  [phone]    ___________________________________________________________________    Subjective & Interval History:   Last 24 hr care team notes reviewed. Patient afebrile. No fevers, chills. No cough. No chest/abdominal pain. No dysuria. No diarrhea. No rashes.    4 point ROS otherwise negative.    Medications: Reviewed in EPIC. List below for reference    Physical Exam:    Blood pressure 97/65, pulse 108, temperature 98.1  F (36.7  C), temperature source Oral, resp. rate 20, height 1.48 m (4' 10.25\"), weight 76 kg (167 lb 8 oz), SpO2 98 %, not currently breastfeeding.    CONSTITUTIONAL:  Ms. Michel is sitting up in chair in no apparent distress.  HEENT: MMM. EOMI.   LUNGS: Normal respiratory effort without increased work of breathing. No wheezes appreciated. Diminished in bilateral bases.   CARDIOVASCULAR: Irregularly irregular, tachycardic w/ no M/R/G.   ABDOMEN: Soft, nondistended, NT, BS +ve.   MUSCULOSKELETAL: Moves all four extremities without difficulty. Diffuse anasarca.  NEURO: A&Ox3, CN II-XII grossly intact, non-focal.     Labs & Studies of Note: Reviewed in Epic  CMP    Recent Labs  Lab 07/16/17  0734 07/15/17  0803 07/14/17  0746 07/13/17 2017 07/13/17  0744 07/12/17  0641 07/11/17  0824    136 136 135 137 134 136   POTASSIUM 4.1 4.4 4.3 4.7 4.0 4.0 3.8   CHLORIDE 104 105 101 101 102 99 101   CO2 26 24 26 26 27 25 29   ANIONGAP 8 8 9 9 8 10 6   GLC 85 100* 92 143* 105* 103* 94   BUN 37* 42* 40* 37* 36* 28 24 "   CR 0.85 0.99 1.09* 0.97 0.80 0.67 0.66   GFRESTIMATED 69 58* 52* 59* 74 >90Non  GFR Calc >90Non  GFR Calc   GFRESTBLACK 84 70 63 71 90 >90African American GFR Calc >90African American GFR Calc   VIKTORIYA 9.1 8.5 8.6 8.1* 8.5 8.2* 8.4*   MAG  --   --   --   --  2.3 1.9 1.7   PHOS  --   --   --   --   --   --  3.1   PROTTOTAL 5.9* 5.3*  --   --   --   --   --    ALBUMIN 3.8 2.5* 2.5*  --   --   --   --    BILITOTAL 1.7* 0.9  --   --   --   --   --    ALKPHOS 127 116  --   --   --   --   --    AST 56* 41  --   --   --   --   --    ALT 37 34  --   --   --   --   --        CBC    Recent Labs  Lab 07/16/17  0734 07/15/17  0803 07/14/17  0746 07/13/17 2017   WBC 2.0* 2.8* 2.2* 2.9*   RBC 2.12* 2.60* 2.53* 2.64*   HGB 6.6* 8.3* 7.8* 8.0*   HCT 20.6* 24.9* 24.0* 24.9*   PLT 36* 42* 39* 45*     INR    Recent Labs  Lab 07/16/17  0734 07/10/17  0546   INR 1.41* 1.43*     Medication List for Reference (delete if desired)  Current Facility-Administered Medications   Medication     acetaminophen (TYLENOL) tablet 650 mg     thiamine tablet 50 mg     folic acid (FOLVITE) tablet 1 mg     atenolol (TENORMIN) tablet 50 mg     midodrine (PROAMATINE) tablet 10 mg     miconazole (MICATIN; MICRO GUARD) 2 % powder     predniSONE (DELTASONE) tablet 7.5 mg     calcium carbonate (TUMS) chewable tablet 500 mg     pantoprazole (PROTONIX) EC tablet 40 mg     digoxin (LANOXIN) tablet 125 mcg     ondansetron (ZOFRAN) tablet 4 mg     prochlorperazine (COMPAZINE) injection 5-10 mg     ondansetron (ZOFRAN) injection 4 mg     simethicone (MYLICON) chewable tablet 80 mg     sodium chloride (PF) 0.9% PF flush 10-20 mL     sodium chloride (PF) 0.9% PF flush 10 mL     heparin lock flush 10 UNIT/ML injection 5-10 mL     dextrose 10 % 1,000 mL infusion     gabapentin (NEURONTIN) capsule 200 mg     melatonin tablet 3 mg     morphine 0.1% in solosite topical gel 2 g     multivitamin, therapeutic with minerals (THERA-VIT-M) tablet  1 tablet     lidocaine 1 % 1 mL     lidocaine (LMX4) kit     sodium chloride (PF) 0.9% PF flush 3 mL     medication instruction     acetaminophen (TYLENOL) Suppository 650 mg     Patient is already receiving anticoagulation with heparin, enoxaparin (LOVENOX), warfarin (COUMADIN)  or other anticoagulant medication     glucose 40 % gel 15-30 g    Or     dextrose 50 % injection 25-50 mL    Or     glucagon injection 1 mg     naloxone (NARCAN) injection 0.1-0.4 mg     calcium-vitamin D (CALTRATE) 600-400 MG-UNIT per tablet 2 tablet     morphine 0.1% in solosite topical gel     potassium chloride SA (K-DUR/KLOR-CON M) CR tablet 20-40 mEq     potassium chloride (KLOR-CON) Packet 20-40 mEq     potassium chloride 10 mEq in 100 mL intermittent infusion     potassium chloride 10 mEq in 100 mL intermittent infusion with 10 mg lidocaine     potassium chloride 20 mEq in 50 mL intermittent infusion     magnesium sulfate 2 g in NS intermittent infusion (PharMEDium or FV Cmpd)     magnesium sulfate 4 g in 100 mL sterile water (premade)     sodium phosphate 10 mmol in D5W intermittent infusion     sodium phosphate 15 mmol in D5W intermittent infusion     sodium phosphate 20 mmol in D5W intermittent infusion     sodium phosphate 25 mmol in D5W intermittent infusion     melatonin tablet 1 mg     traMADol (ULTRAM) half-tab 25 mg

## 2017-07-16 NOTE — PROVIDER NOTIFICATION
At 0100 while repositioning in bed pt. became increasingly short of breath with RR ~30s. Pt. was assisted to sit upright and was able to recover on own within a couple of minutes. Albumin stopped for brief period to assess fluid status. LS diminished in bases/unchanged and VSS stable/unchanged. MD here to assess patient. Continue to monitor.

## 2017-07-16 NOTE — PLAN OF CARE
Problem: Goal Outcome Summary  Goal: Goal Outcome Summary  PT 6C: patient with limited tolerance secondary to low hemoglobin and increased SOB this day. Assisted back into bed requiring Lidia for legs, CGA for transfers without AD.      Recommend discharge to ARU to progress activity tolerance for increased indep with overall functional mobility.

## 2017-07-16 NOTE — PLAN OF CARE
OT: Attempted to see pt later AM; however, RN stated he's just starting RBC IV.  Will attempt this PM if schedule permits.

## 2017-07-16 NOTE — PLAN OF CARE
Problem: Goal Outcome Summary  Goal: Goal Outcome Summary  Outcome: No Change  D: Readmitted 6/19 from ARU with fever and concern for worsening LUE infection.  I/A: A&Ox4. VSS on 2L NC. A-flutter 110s. PRN tylenol ordered for c/o headache, otherwise denies pain. Shortness of breath at rest, pt. has difficulty holding conversation without taking breaks. MONTALVO. Lactic acid trending down- 3.4, second dose of albumin 50 mg infused per orders. Incontinent of urine, adequate uop. BMx1 this AM. Assisted to reposition in bed. Up with 1-assist. Appeared to sleep comfortably overnight.  P: Continue to monitor and notify Cards 1 with questions/concerns.

## 2017-07-17 ENCOUNTER — APPOINTMENT (OUTPATIENT)
Dept: OCCUPATIONAL THERAPY | Facility: CLINIC | Age: 57
DRG: 871 | End: 2017-07-17
Attending: INTERNAL MEDICINE
Payer: COMMERCIAL

## 2017-07-17 ENCOUNTER — APPOINTMENT (OUTPATIENT)
Dept: PHYSICAL THERAPY | Facility: CLINIC | Age: 57
DRG: 871 | End: 2017-07-17
Attending: INTERNAL MEDICINE
Payer: COMMERCIAL

## 2017-07-17 LAB
ANION GAP SERPL CALCULATED.3IONS-SCNC: 8 MMOL/L (ref 3–14)
B HENSELAE IGG TITR SER IF: NORMAL {TITER}
B HENSELAE IGM TITR SER IF: NORMAL {TITER}
BASOPHILS # BLD AUTO: 0 10E9/L (ref 0–0.2)
BASOPHILS NFR BLD AUTO: 0.5 %
BUN SERPL-MCNC: 32 MG/DL (ref 7–30)
CALCIUM SERPL-MCNC: 9.1 MG/DL (ref 8.5–10.1)
CHLORIDE SERPL-SCNC: 104 MMOL/L (ref 94–109)
CO2 SERPL-SCNC: 27 MMOL/L (ref 20–32)
COPATH REPORT: NORMAL
CREAT SERPL-MCNC: 0.7 MG/DL (ref 0.52–1.04)
DIFFERENTIAL METHOD BLD: ABNORMAL
EOSINOPHIL # BLD AUTO: 0 10E9/L (ref 0–0.7)
EOSINOPHIL NFR BLD AUTO: 0.9 %
ERYTHROCYTE [DISTWIDTH] IN BLOOD BY AUTOMATED COUNT: 27.2 % (ref 10–15)
GFR SERPL CREATININE-BSD FRML MDRD: 86 ML/MIN/1.7M2
GLUCOSE SERPL-MCNC: 80 MG/DL (ref 70–99)
HCT VFR BLD AUTO: 27.9 % (ref 35–47)
HGB BLD-MCNC: 8.8 G/DL (ref 11.7–15.7)
IMM GRANULOCYTES # BLD: 0 10E9/L (ref 0–0.4)
IMM GRANULOCYTES NFR BLD: 0.5 %
LACTATE BLD-SCNC: 3 MMOL/L (ref 0.7–2.1)
LYMPHOCYTES # BLD AUTO: 0.5 10E9/L (ref 0.8–5.3)
LYMPHOCYTES NFR BLD AUTO: 20.7 %
MCH RBC QN AUTO: 29.9 PG (ref 26.5–33)
MCHC RBC AUTO-ENTMCNC: 31.5 G/DL (ref 31.5–36.5)
MCV RBC AUTO: 95 FL (ref 78–100)
MONOCYTES # BLD AUTO: 0.4 10E9/L (ref 0–1.3)
MONOCYTES NFR BLD AUTO: 17.5 %
NEUTROPHILS # BLD AUTO: 1.3 10E9/L (ref 1.6–8.3)
NEUTROPHILS NFR BLD AUTO: 59.9 %
NRBC # BLD AUTO: 0 10*3/UL
NRBC BLD AUTO-RTO: 0 /100
PLATELET # BLD AUTO: 34 10E9/L (ref 150–450)
POTASSIUM SERPL-SCNC: 4 MMOL/L (ref 3.4–5.3)
RBC # BLD AUTO: 2.94 10E12/L (ref 3.8–5.2)
SODIUM SERPL-SCNC: 139 MMOL/L (ref 133–144)
WBC # BLD AUTO: 2.2 10E9/L (ref 4–11)

## 2017-07-17 PROCEDURE — 80048 BASIC METABOLIC PNL TOTAL CA: CPT | Performed by: INTERNAL MEDICINE

## 2017-07-17 PROCEDURE — 99232 SBSQ HOSP IP/OBS MODERATE 35: CPT | Mod: GC | Performed by: INTERNAL MEDICINE

## 2017-07-17 PROCEDURE — 36415 COLL VENOUS BLD VENIPUNCTURE: CPT | Performed by: PHYSICAL MEDICINE & REHABILITATION

## 2017-07-17 PROCEDURE — 97110 THERAPEUTIC EXERCISES: CPT | Mod: GP

## 2017-07-17 PROCEDURE — 21400006 ZZH R&B CCU INTERMEDIATE UMMC

## 2017-07-17 PROCEDURE — 25000132 ZZH RX MED GY IP 250 OP 250 PS 637: Performed by: INTERNAL MEDICINE

## 2017-07-17 PROCEDURE — 25000125 ZZHC RX 250: Performed by: INTERNAL MEDICINE

## 2017-07-17 PROCEDURE — 83605 ASSAY OF LACTIC ACID: CPT | Performed by: PHYSICAL MEDICINE & REHABILITATION

## 2017-07-17 PROCEDURE — 25000132 ZZH RX MED GY IP 250 OP 250 PS 637

## 2017-07-17 PROCEDURE — 85025 COMPLETE CBC W/AUTO DIFF WBC: CPT | Performed by: INTERNAL MEDICINE

## 2017-07-17 PROCEDURE — 40000193 ZZH STATISTIC PT WARD VISIT

## 2017-07-17 PROCEDURE — 25000132 ZZH RX MED GY IP 250 OP 250 PS 637: Performed by: NURSE PRACTITIONER

## 2017-07-17 PROCEDURE — 40000133 ZZH STATISTIC OT WARD VISIT

## 2017-07-17 PROCEDURE — 97530 THERAPEUTIC ACTIVITIES: CPT | Mod: GP

## 2017-07-17 PROCEDURE — 25000132 ZZH RX MED GY IP 250 OP 250 PS 637: Performed by: STUDENT IN AN ORGANIZED HEALTH CARE EDUCATION/TRAINING PROGRAM

## 2017-07-17 PROCEDURE — 25000128 H RX IP 250 OP 636: Performed by: INTERNAL MEDICINE

## 2017-07-17 PROCEDURE — 97535 SELF CARE MNGMENT TRAINING: CPT | Mod: GO

## 2017-07-17 PROCEDURE — 36415 COLL VENOUS BLD VENIPUNCTURE: CPT | Performed by: INTERNAL MEDICINE

## 2017-07-17 RX ORDER — HYDROXYZINE HYDROCHLORIDE 10 MG/1
10 TABLET, FILM COATED ORAL ONCE
Status: COMPLETED | OUTPATIENT
Start: 2017-07-17 | End: 2017-07-17

## 2017-07-17 RX ORDER — FUROSEMIDE 10 MG/ML
40 INJECTION INTRAMUSCULAR; INTRAVENOUS ONCE
Status: COMPLETED | OUTPATIENT
Start: 2017-07-17 | End: 2017-07-17

## 2017-07-17 RX ADMIN — ATENOLOL 50 MG: 50 TABLET ORAL at 08:09

## 2017-07-17 RX ADMIN — Medication 2 G: at 18:15

## 2017-07-17 RX ADMIN — MULTIPLE VITAMINS W/ MINERALS TAB 1 TABLET: TAB at 08:08

## 2017-07-17 RX ADMIN — ATENOLOL 50 MG: 50 TABLET ORAL at 20:51

## 2017-07-17 RX ADMIN — FOLIC ACID 1 MG: 1 TABLET ORAL at 08:09

## 2017-07-17 RX ADMIN — MIDODRINE HYDROCHLORIDE 10 MG: 5 TABLET ORAL at 11:46

## 2017-07-17 RX ADMIN — THIAMINE HCL (VITAMIN B1) 50 MG TABLET 50 MG: at 08:07

## 2017-07-17 RX ADMIN — MICONAZOLE NITRATE: 2 POWDER TOPICAL at 08:13

## 2017-07-17 RX ADMIN — PANTOPRAZOLE SODIUM 40 MG: 40 TABLET, DELAYED RELEASE ORAL at 06:26

## 2017-07-17 RX ADMIN — POTASSIUM CHLORIDE 20 MEQ: 750 TABLET, EXTENDED RELEASE ORAL at 08:08

## 2017-07-17 RX ADMIN — MICONAZOLE NITRATE: 2 POWDER TOPICAL at 20:51

## 2017-07-17 RX ADMIN — GABAPENTIN 200 MG: 100 CAPSULE ORAL at 20:51

## 2017-07-17 RX ADMIN — MIDODRINE HYDROCHLORIDE 10 MG: 5 TABLET ORAL at 18:15

## 2017-07-17 RX ADMIN — HYDROXYZINE HYDROCHLORIDE 10 MG: 10 TABLET ORAL at 22:23

## 2017-07-17 RX ADMIN — GABAPENTIN 200 MG: 100 CAPSULE ORAL at 08:07

## 2017-07-17 RX ADMIN — GABAPENTIN 200 MG: 100 CAPSULE ORAL at 13:24

## 2017-07-17 RX ADMIN — DIGOXIN 125 MCG: 125 TABLET ORAL at 08:08

## 2017-07-17 RX ADMIN — MIDODRINE HYDROCHLORIDE 10 MG: 5 TABLET ORAL at 08:08

## 2017-07-17 RX ADMIN — PREDNISONE 5 MG: 5 TABLET ORAL at 08:08

## 2017-07-17 RX ADMIN — FUROSEMIDE 40 MG: 10 INJECTION, SOLUTION INTRAVENOUS at 13:24

## 2017-07-17 RX ADMIN — CALCIUM CARBONATE 600 MG (1,500 MG)-VITAMIN D3 400 UNIT TABLET 2 TABLET: at 08:07

## 2017-07-17 NOTE — PLAN OF CARE
Problem: Goal Outcome Summary  Goal: Goal Outcome Summary  Outcome: No Change  D/I/A:  AOx4 in room. A-flutter (60's - 120's), SaO2 >92% on 2-4L via NC. Pt denied pain, nausea. Hbg diluted d/t 100 g albumin 7/15, this AM = 6.6. 1 U PRBCs given. Diuresing w/ 40mg IV Lasix, AM K = 4.1. Incontinent of urine, stool d/t saddle anesthesia (baseline). LUE wound drsg changed per WOC note. Port-CXR to assess pleural effusions.      Plan:  Monitor HR/R, fluid balance and respiratory status. Encourage PT/OT. Notify provider of changes.        Problem: Sepsis (Adult)  Goal: Signs and Symptoms of Listed Potential Problems Will be Absent or Manageable (Sepsis)  Signs and symptoms of listed potential problems will be absent or manageable by discharge/transition of care (reference Sepsis (Adult) CPG).   Outcome: Improving    07/16/17 1947   Sepsis   Problems Assessed (Sepsis) all   Problems Present (Sepsis) hypoxia/hypoxemia;situational response

## 2017-07-17 NOTE — PROGRESS NOTES
CLINICAL NUTRITION SERVICES - REASSESSMENT NOTE     Nutrition Prescription    RECOMMENDATIONS FOR MDs/PROVIDERS TO ORDER:  Rec liberalize diet to a low-sodium vs regular to encourage oral intake.     Future/Additional Recommendations:  1. Encourage high protein options as able. Also, encourage intake of beneprotein.   2. Continue calcium/vitamin D as ordered due to prednisone.  3. Thiamine supplementation as per team.   4. Consider discontinuation of folic acid supplementation, if able. Folate lab was 41.7 on 7/8/17.      EVALUATION OF THE PROGRESS TOWARD GOALS   Diet: Cardiac since 7/14. Has orders for one cup of 1% milk with a packet of beneprotein prn with meals.   Intake: Good diet tolerance. Flowsheets indicate pt consuming 100% of meals with a good appetite 7/10, 25-90% of meals with a poor to good appetite 7/11, 100% of meals with a good appetite 7/12-7/14, 75% of meals with a good appetite 7/15, and 100% of meals with a good appetite 7/16.  Pt busy with therapy during first attempt. On second attempt, pt states her appetite is better than when she was on TFs but feels she is not able to order the foods she likes. Limits her sodium to 2000 mg/day in general. Per pt's , Romeo, pt's appetite has improved. Her nausea has improved. Romeo estimates she is eating about 75% of her usual oral intake about a year ago. Pt has been preferring to eat fewer carbohydrates (or starches). She feels limited by the menu and her restrictive diet order. Prefers cold food. Romeo mentioned that she is motivated. [employer] indicates food/beverages sent 7/12-7/16 totaled 923 kcals and 48 g protein daily on average which does not meet estimated needs of 8353-5549 kcals/day (25 - 30 kcals/kg) and 61-77 grams protein/day (1.2 - 1.5 grams of pro/kg). However, this does not include outside food. She has been ordering less food (fewer kcals/protein) via room service since her diet order was changed to cardiac.     NEW FINDINGS    WOC nurse 7/12: Large area of epidermal loss with potential full thickness skin loss at area of necrosis 2/2 extravastion.  Measurements unchanged but slough is loosening well and beginning to debride with use of Medihoney.  Pain level has increased only slightly with use of Medihoney. Recommend hand therapy soon to avoid contracture.      MALNUTRITION  % Intake: Decreased intake does not meet criteria consistently.  % Weight Loss: Difficult to assess with changes in fluid status, diuresis  Subcutaneous Fat Loss: None observed, but difficult to assess with fluid status.  Muscle Loss: None observed, but difficult to assess with fluid status.  Fluid Accumulation/Edema: Moderate. Diffuse anasarca.   Malnutrition Diagnosis: Patient does not meet two of the above criteria necessary for diagnosing malnutrition but is at risk for malnutrition    Previous Goals   Patient to consume % of nutritionally adequate meal trays TID, or the equivalent with supplements/snacks.  Evaluation: Not meeting adequacy consistently.    Previous Nutrition Diagnosis  Inadequate oral intake related to decreased appetite, LOS, and previously with nausea as evidenced by pt consumed a three-day kcal count average of 1020 kcals and 54 g protein daily and not quite meeting estimated needs of 9319-6133 kcals/day (25 - 30 kcals/kg) and 61-77 grams protein/day (1.2 - 1.5 grams of pro/kg).  Evaluation: Unresolved.    CURRENT NUTRITION DIAGNOSIS  Inadequate oral intake related to food choices, restrictive diet, LOS, and nausea at times as evidenced by food/beverages sent 7/12-7/16 totaled 923 kcals and 48 g protein daily on average which does not meet estimated needs of 1233-9361 kcals/day (25 - 30 kcals/kg) and 61-77 grams protein/day (1.2 - 1.5 grams of pro/kg).    INTERVENTIONS  Implementation  1. Collaboration with other providers: Paged team to rec liberalized diet.   2. Medical food supplement therapy: Offered to schedule Beneprotein and 1%  milk between meals. Per pt, still has a surplus of Beneprotein in her room. Will continue with prn Beneprotein order. Did rec she try to consume one packet of Beneprotein with a cup of 1% milk once daily.   3. Nutrition education: Discussed current diet order and diet restrictions. Rec high protein options and adequate kcals. Discussed examples of high protein choices.     Goals  Patient to consume % of nutritionally adequate meal trays TID, or the equivalent with supplements/snacks.    Monitoring/Evaluation  Progress toward goals will be monitored and evaluated per protocol.     Nutrition will continue to follow.    Inés Brown, MS, RD, LD, [facility]   6C Pgr:  [phone]

## 2017-07-17 NOTE — PROGRESS NOTES
Cardiology Daily Note   Date of Service: 7/17/2017  Patient: Amelia Michel  MRN: [id number]  Admission Date: 6/19/2017  Hospital Day # 28    Assessment & Plan:   Amelia Michel is a 56 year old female with PMHx of VSD s/p repair (1969), RA, recently diagnosed atrial fibrillation that was complicated with an OOH cardiac arrest (felt 2/2 long pause with degeneration to VFib while converting Afib to SR with multiple AV prieto agents) who was readmitted from ARU on 6/19 with fever and concern for worsening LUE infection. She is now transferred back to cardiology service as primary on 6/26 for further workup and evaluation of recurrent lactic acidosis, persistent afib and softer blood pressures with tachypnea.     # Fever of unknown origin, last fever 7/12/17  Previously attributed to LUE wound, though wound has continued to improve without surrounding appearance of cellulitis. Discontinued antibiotics with close monitoring. Summary of work-up -- no DVT on ultrasound 7/5/17, MRI of complete spine without nidus of infection 7/6/17, BRE with vegetations raised possibility of endocarditis, but only 1 major/2 minor Duke's criteria, does not have definitively. Serologies for coxiella and bartonella are in process. Bmbx results in process. CT chest 7/13/17 with possible PNA and elevated procalcitonin, but without symptoms or hemodynamic instability.   - Patient neutropenic, not to degree to warrant antibiotics for fever at present  - Heme/onc following, appreciate recs  - ID consultation, appreciate recommendations  - Thiamine     # Volume overload/anasarca:  # HFrEF (EF 50-55%):  # Hypotension:  Patient initially up 35# at admission; diuresis challenging, secondary to third spacing of fluid (albumin 1.9), but responded with addition of midodrine and ongoing albumin resuscitation. Right heart cath 6/26 with mildly elevated right and left sided filling presures  - Intermittent IV furosemide; gave 40mg IV lasix on  7/17  - Continue midodrine to 10 mg TID     # CELSA, improving  Likely prerenal with combination of diuresis and NSAID use for FUO. Also consider contrast injury given CT chest.   - D/C naproxen     # Atrial fibrillation/flutter with RVR:  RVR in setting of volume overload above. Patient rec'd single dose of diltiazem with associated soft BPs.   - Continue dig 125mcg  - Atenolol 50 mg BID    - Not anticoagulated due to thrombocytopenia    # Out of hospital arrest:  Pt will need to wear LifeVest until medically appropriate and cleared from infection/wound standpoint for ICD implantation likely 4-6 weeks after discharge per EP (pending hospital course for LUE wound).       # Acute on Chronic Thrombocytopenia:   Plts noted to decrease from 104 to as low as 15 during recent hospitalization requiring transfusion w/ chronically low in the 's as outpatient. Possible bactrim was offending agent for exacerbation, and discontinued this medication (exchanged for meropenem in the setting of worsening cellulitis).   - If this is chronic ITP, no need for treatment unless plts drift < 10s if not bleeding  - Continue to hold RA meds for now  - Holding apixiban (last dose 7/8/17)    # Acute on chronic anemia: Stable  No evidence of overt bleeding. Hgb 6.6 on 7/16/17 likely dilutional with all lines down and ongoing resuscitation with albumin, NS.   - Daily CBC  - 1 U RBCs 7/16/17    # RA:   History of seropositive rheumatoid arthritis (anti-CCP positive) since late 1980;s w/ multiple MTP osteotomies, partial right wrist fusion, longstanding therapy consisting of MTX, prednisone and HCQ  - Tapered to 5 mg prednisone on 7/17 with continued monitoring for flaring  - Rheumatology following  - Continue to hold HCQ and MTX until outpatient follow-up  - Follow-up with [facility] Dr. Conor Cunha in 4-6 weeks after discharge      # Severe Critical Illness Myopathy:   Significant deconditioning w/ median neuropathies most  likely localized to the wrists and ulnar neuropathies most likely localized to the elbows secondary to RA.  - Ongoing extensive PT/OT/SLP    Consulting Services: Nutrition, heme/onc, ID, rheumatology    CODE: Full Code  DVT Ppx: Apixaban resumed 6/28  Diet/Fluids: As above.  Disposition: Pending improvement in above.    Pt's care was discussed with bedside RN and patient during Care Team Rounds.    Patient was discussed and evaluated with Dr. Mcguire.    Mike Alicia MD  Internal Medicine Resident, PGY2  [phone]    CARDIOLOGY STAFF  Patient seen and examined by me.  History and physical examination discussed with Dr. Alicia whose note reflects our joint assessment and recommendation/plans.  I am assuming the Cardiology 1 service today.  56 year old woman with complex history including out-of hospital cardiac arrest in May complicated by end organ failure including CELSA, bone marrow failure.  She has had fevers of unknown origin, possible sources include infection with multiple possible sources having been addressed, including a previously infiltrated IV site. For now, antibiotic s are on hold.  Bone marrow biopsy has raised the possibility of lymphoma but this does not appear certain yet.  She has volume overload with much third-spacing due to low albumin.  She is receiving intermittent diuresis.  We are following up with Hematology (re possible lymphoma), and will address fluid status on a daily basis.  Richy Mcguire      ___________________________________________________________________    Subjective & Interval History:   -nursing notes reviewed  -chart reviewed  -no acute events over night  -patient feels well this morning  -Hem/Onc stopped by earlier and discussed the potential for lymphoma; she is processing this appropriatelty  -she has a itchy rash on her abdomen; not very bothersome  -no fevers, chills, SOB more than normal    Medications: Reviewed in EPIC. List below for reference    Physical Exam:  "   Blood pressure (!) 114/96, pulse 96, temperature 98.8  F (37.1  C), temperature source Oral, resp. rate 20, height 1.48 m (4' 10.25\"), weight 75 kg (165 lb 4.8 oz), SpO2 98 %, not currently breastfeeding.    CONSTITUTIONAL:  Ms. Michel is sitting up in chair in no apparent distress.  HEENT: MMM. EOMI.   LUNGS: Normal respiratory effort without increased work of breathing. No wheezes appreciated. Diminished in bilateral bases.   CARDIOVASCULAR: Irregularly irregular, tachycardic w/ no M/R/G.   ABDOMEN: Soft, nondistended, NT, BS +ve.   MUSCULOSKELETAL: Moves all four extremities without difficulty. Diffuse anasarca.  NEURO: A&Ox3, CN II-XII grossly intact, non-focal.     Labs & Studies of Note: Reviewed in Epic  CMP    Recent Labs  Lab 07/17/17  0706 07/16/17  2121 07/16/17  0734 07/15/17  0803 07/14/17  0746  07/13/17  0744 07/12/17  0641 07/11/17  0824     --  138 136 136  < > 137 134 136   POTASSIUM 4.0 4.0 4.1 4.4 4.3  < > 4.0 4.0 3.8   CHLORIDE 104  --  104 105 101  < > 102 99 101   CO2 27  --  26 24 26  < > 27 25 29   ANIONGAP 8  --  8 8 9  < > 8 10 6   GLC 80  --  85 100* 92  < > 105* 103* 94   BUN 32*  --  37* 42* 40*  < > 36* 28 24   CR 0.70  --  0.85 0.99 1.09*  < > 0.80 0.67 0.66   GFRESTIMATED 86  --  69 58* 52*  < > 74 >90Non  GFR Calc >90Non  GFR Calc   GFRESTBLACK >90African American GFR Calc  --  84 70 63  < > 90 >90African American GFR Calc >90African American GFR Calc   VIKTORIYA 9.1  --  9.1 8.5 8.6  < > 8.5 8.2* 8.4*   MAG  --   --   --   --   --   --  2.3 1.9 1.7   PHOS  --   --   --   --   --   --   --   --  3.1   PROTTOTAL  --   --  5.9* 5.3*  --   --   --   --   --    ALBUMIN  --   --  3.8 2.5* 2.5*  --   --   --   --    BILITOTAL  --   --  1.7* 0.9  --   --   --   --   --    ALKPHOS  --   --  127 116  --   --   --   --   --    AST  --   --  56* 41  --   --   --   --   --    ALT  --   --  37 34  --   --   --   --   --    < > = values in this interval not " displayed.    CBC    Recent Labs  Lab 07/17/17  0706 07/16/17  0734 07/15/17  0803 07/14/17  0746   WBC 2.2* 2.0* 2.8* 2.2*   RBC 2.94* 2.12* 2.60* 2.53*   HGB 8.8* 6.6* 8.3* 7.8*   HCT 27.9* 20.6* 24.9* 24.0*   PLT 34* 36* 42* 39*     INR    Recent Labs  Lab 07/16/17  0734   INR 1.41*     Medication List for Reference (delete if desired)  Current Facility-Administered Medications   Medication     acetaminophen (TYLENOL) tablet 650 mg     predniSONE (DELTASONE) tablet 5 mg     thiamine tablet 50 mg     folic acid (FOLVITE) tablet 1 mg     atenolol (TENORMIN) tablet 50 mg     midodrine (PROAMATINE) tablet 10 mg     miconazole (MICATIN; MICRO GUARD) 2 % powder     calcium carbonate (TUMS) chewable tablet 500 mg     pantoprazole (PROTONIX) EC tablet 40 mg     digoxin (LANOXIN) tablet 125 mcg     ondansetron (ZOFRAN) tablet 4 mg     prochlorperazine (COMPAZINE) injection 5-10 mg     ondansetron (ZOFRAN) injection 4 mg     simethicone (MYLICON) chewable tablet 80 mg     sodium chloride (PF) 0.9% PF flush 10-20 mL     sodium chloride (PF) 0.9% PF flush 10 mL     heparin lock flush 10 UNIT/ML injection 5-10 mL     dextrose 10 % 1,000 mL infusion     gabapentin (NEURONTIN) capsule 200 mg     melatonin tablet 3 mg     morphine 0.1% in solosite topical gel 2 g     multivitamin, therapeutic with minerals (THERA-VIT-M) tablet 1 tablet     lidocaine 1 % 1 mL     lidocaine (LMX4) kit     sodium chloride (PF) 0.9% PF flush 3 mL     medication instruction     acetaminophen (TYLENOL) Suppository 650 mg     Patient is already receiving anticoagulation with heparin, enoxaparin (LOVENOX), warfarin (COUMADIN)  or other anticoagulant medication     glucose 40 % gel 15-30 g    Or     dextrose 50 % injection 25-50 mL    Or     glucagon injection 1 mg     naloxone (NARCAN) injection 0.1-0.4 mg     calcium-vitamin D (CALTRATE) 600-400 MG-UNIT per tablet 2 tablet     morphine 0.1% in solosite topical gel     potassium chloride SA  (K-DUR/KLOR-CON M) CR tablet 20-40 mEq     potassium chloride (KLOR-CON) Packet 20-40 mEq     potassium chloride 10 mEq in 100 mL intermittent infusion     potassium chloride 10 mEq in 100 mL intermittent infusion with 10 mg lidocaine     potassium chloride 20 mEq in 50 mL intermittent infusion     magnesium sulfate 2 g in NS intermittent infusion (PharMEDium or FV Cmpd)     magnesium sulfate 4 g in 100 mL sterile water (premade)     sodium phosphate 10 mmol in D5W intermittent infusion     sodium phosphate 15 mmol in D5W intermittent infusion     sodium phosphate 20 mmol in D5W intermittent infusion     sodium phosphate 25 mmol in D5W intermittent infusion     melatonin tablet 1 mg     traMADol (ULTRAM) half-tab 25 mg

## 2017-07-17 NOTE — PLAN OF CARE
Problem: Goal Outcome Summary  Goal: Goal Outcome Summary  Outcome: No Change     D/I/A:  AOx4 in room. AF/flutter (60's - 120's), SaO2 >94% on RA up in chair in AM. Required O2 once in bed to maintain sats (2-4L via NC). Hbg =8.8. IV Lasix given, incontinent x3 d/t saddle anesthesia. AM K = 4.0, replaced per protocol. LUE wound drsg not done during D shift.       Plan:  Monitor HR/R, fluid balance and respiratory status. Encourage PT/OT. Notify provider of changes.    Problem: Sepsis (Adult)  Goal: Signs and Symptoms of Listed Potential Problems Will be Absent or Manageable (Sepsis)  Signs and symptoms of listed potential problems will be absent or manageable by discharge/transition of care (reference Sepsis (Adult) CPG).   Outcome: Improving    07/17/17 1607   Sepsis   Problems Assessed (Sepsis) all   Problems Present (Sepsis) situational response

## 2017-07-17 NOTE — PLAN OF CARE
Problem: Goal Outcome Summary  Goal: Goal Outcome Summary  OT/EDEMA 6C: Patient unable to tolerate wear of Jobst compression stockings for 2 days due to increased thigh high edema. 2+ spongy pitting edema present primarily from knee to groin and increases indicated in 10/16 circumferential measurements since 7/12/17. Will trial larger size of Jobst stockings bilaterally to increase comfort/tolerance and due to lower Platelet count today. Large/20-30mmHg/thigh high Jobst compression stockings donned and to be worn during daytime hours as tolerated for edema management purposes. Stockings to be removed at night time and replaced with custom velcro garments. *See patient care order for details*. Please discontinue use of compression if increased pain, numbness/tingling or soiling occurs.

## 2017-07-17 NOTE — PLAN OF CARE
Problem: Goal Outcome Summary  Goal: Goal Outcome Summary  OT/6C:  Pt engaged in shower task to improve endurance, strength and activity tolerance.  Pt endorses more difficulty breathing this date 2/2 fluid retention and requires more frequent rest breaks for fatigue management.  Pt performs TB shower while seated on shower chair with set up assist, max A for washing back and buttocks.  Educated pt on AE to improve IND with washing these areas, verbalized understanding. Limited by L hand coordination deficits, fatigue, SOB, strength and ROM.       REC:  Pt continues to be an ideal candidate for ARU upon discharge due to having excellent participation, motivation, and family support.

## 2017-07-17 NOTE — PLAN OF CARE
Problem: Goal Outcome Summary  Goal: Goal Outcome Summary     Readmitted 6/19 from ARU with fever and concern for worsening LUE infection. Hx of recent OOH arrest. Vital signs stable, afebrile, A&Ox4, afib 40s-80s. Denied pain overnight. K recheck of 4.0 replaced per protocol. Pt had 16 beats of VT when potassium replacement given at 2358, MD aware. Turned/repositoined per pt request. Urinary incontinence d/t saddle anesthesia. LUE would covered, dressing CDI. Continue to monitor and notify MD with pertinent changes.

## 2017-07-17 NOTE — PLAN OF CARE
Problem: Goal Outcome Summary  Goal: Goal Outcome Summary  PT 6C: patient with increased activity tolerance this day; ambulates in ~50 feet bouts x4 with FWW SBA and mwc follow. On 2L O2 during ambulation sats 92-95%, HR ranges from 80s-100s. Engaged in aerobic exercise on Nustep for 8 minutes 30 seconds. VSS throughout activity this day. Continues to require Lidia for B LE into bed and occasional modA for sit to stands from lower surfaces.      Recommend ARU for increased indep with overall functional mobility

## 2017-07-17 NOTE — PROGRESS NOTES
HEMATOLOGY BRIEF PROGRESS NOTE    Discussion:    Bree Michel is a 55 yo female with PMHx of VSD, RA (on chronic methotrexate), and a recently diagnosed Afib resulting in cardiac arrest on 5/31, presenting with normocytic anemia, thrombocytopenia, and leukopenia, now in the setting of a fever of unknown origin.    Flow cytometry of bone marrow aspirate revealed polytypic B-cells and plasma cells, along with an abnormal abundance (15%) and immunophenotype (HLA-DR-) of monocytic cells. It was noted this finding is of unknown significance, and could simply be relative to reactive change secondary to immunosuppression. Importantly, there was no immunophenotypic evidence of neoplastic cells, however, it is notable that large neoplastic cells often do not remain intact with flow cytometric processing.    Surprisingly, bone marrow cytopathology demonstrated 50-60% marrow cellularity with foci of large, atypical CD20+ B-cells. Morphologically, these foci are highly suspicious for a B-cell lymphoma or other B-cell lymphoproliferative disorder, but irregular in the fact they appear as foci and not diffusely across the bone marrow. Interestingly, there was no CD5 or CD10 positive B-cells identified. EBV was found to be negative. A malignant DDx would DLBCL, MALT lymphoma, cannot exclude Burkitt lymphoma. However, these findings may also just simply represent an iatrogenic lymphoproliferative disorder from chronic use of methotrexate for RA (>20 year use).     Pathology also notes that it would also be worth considering an intravascular large B-cell lymphoma (IVLBCL). One case series reports that 53 out of 96 cases display CD5-/CD10- immunophenotype (Murase et al, Blood, 2007) with anemia/thrombocytopenia (84%), hepatosplenomegaly (77%), B symptoms (76%), bone marrow involvement (75%) frequently observed. This pt previously had a skin biopsy on 6/15, so it may be possible this biopsy would be sufficient to evaluate the  vasculature for this process.    Given the inconclusive interpretation at this point, it would be worthwhile to pursue further work-up for underlying hematological malignancy. PET-CT would be most specific imaging modality to assess malignant process and would recommend this at this time, especially if this is the case of a GI MALT lymphoma that has previously been undiscovered with prior CT imaging. It would also be worthwhile to run flow cytometry using whole peripheral blood (which would increase number of events available for flow analysis) to assess for rare, aberrant blood cell populations (1 mononuclear cell was identified on cytopathology). We would also recommend screening for monoclonality with serum free light chains. Will also consider 'low-hanging fruit' (though unlikely at this point), including RBC folate deficiency.    RECOMMENDATIONS:   - Recommend PET-CT to assess for underlying malignancy.  - Check peripheral blood flow cytometry.  - Check RBC folate, serum MMA, homocysteine.  - Check serum free light chains (kelsi/kappa ratio)  - Continue goal plt >10k if no bleeding, >20k if active bleeding.  - Continue transfusions PRN for goal Hgb >7  - F/U skin biopsy with Derm/Hemato-path  - continue to hold AC      Agree with belwo documentation from [place].    Pt discussed with Dr. Silverman who agrees with the plan.    Fox Salinas MD  Hematology Oncology fellow  [phone]        RESULTS:    7/12/17: BMBx Cytopathology    ORIGINAL REPORT FOLLOWS ADDENDUM  ADDENDUM     TO ORIGINAL REPORT   Status: Signed Out   Date Ordered:7/17/2017   Date Complete:7/17/2017   Date Reported:7/17/2017 10:19   Signed Out By: Betty Gutierrez M.D., [facility]     INTERPRETATION:   The final diagnosis remains the same.      highlights plasma cells, present as scattered cells and in   perivascular clusters, comprised of kappa and lambda positive cells   without a monotypic population identified.     No  definitive staining of the B-cell population by CD5. CD5 is positive   in CD3 positive T cell population.     On re-review of the peripheral blood associated with this specimen, a   very rare atypical mononuclear cell is seen at the slide edge.   Peripheral blood flow cytometry may be helpful in an attempt to further   characterize this population.     One diagnostic consideration is the rare entity of Intravascular large   B-cell lymphoma. Full evaluation for other sites of disease is   recommended.     Findings in original report discussed with Dr. Silverman 11am on Friday 7/14/17. Case discussed with Dr. Silverman and team on 7/17/17.     __________________________________________     ORIGINAL REPORT:     TEST(S):   Unilateral Bone Marrow Biopsy/Aspiration     FINAL DIAGNOSIS:   Bone marrow, left posterior iliac crest, decalcified trephine biopsy,   aspirate clot, touch imprint, direct aspirate smear, concentrated   aspirate smear, and peripheral blood smear:        Highly suspicious for marrow involvement by B-cell   lymphoma/lymphoproliferative disorder        Marrow cellularity of 50-60% with erythroid predominant trilineage   hematopoiesis with foci of large atypical CD20 positive cells, See   Comment        Peripheral blood showing moderate normochromic normocytic anemia;   moderate leukopenia with neutropenia and lymphopenia; moderate   thrombocytopenia     COMMENT:   Evaluation for other sites of disease involvement with biopsy of that   site is recommended.     The morphologic findings are highly suspicious for marrow involvement by   B-cell lymphoma/lymphoproliferative disorder, with multiple foci of   intermediate to large atypical CD20 positive cells suggesting   involvement by a large B cell lymphoma. By immunohistochemical stains,   these cells are negative for CD10, making Burkitt lymphoma less likely   but it is still in the differential. Because of the patient's   immunosuppression history, there is a  possibility of an iatrogenic   immunodeficiency-associated lymphoproliferative disorder. EBV by in-situ   hybridization was negative.     Lucio Salinas and Herrera paged 10:30am 7/14/17 to discuss findings.     Concurrent marrow flow cytometry ([id number]) identified polytypic B   cells and polytypic plasma cells. This discrepancy is likely due to   either sampling differences or cell loss during processing for flow   cytometry. Please see complete flow report.     Additional immunohistochemical stains ordered to further evaluate this   population include: , kappa, lambda, and CD5. Findings will be   reported in an addendum.     Case shared at intradepartmental consensus conference on 7/14/17.     I have personally reviewed all specimens and/or slides, including the   listed special stains, and used them with my medical judgment to   determine the final diagnosis.     Electronically signed out by:     Betty Gutierrez M.D., [facility]     Technical testing/processing performed at [place], [place]        Flow Cytometry bone marrow aspirate (7/12/17):  SPECIMEN(S):   Bone Marrow, Left     INTERPRETATION:   Bone Marrow, Left:        Polytypic B cells        Polytypic plasma cells        No aberrant immunophenotype on T cells        No increase in myeloid blasts and no abnormal myeloid blast   population        Unusual immunophenotype on monocytic cells        See comment     COMMENT:   There is a prominent population of monocytic cells (15%), majority of   which lack HLA-DR. The  significance of this finding is uncertain, it   could be a reactive change. This flow cytometry assay is not used to   evaluate maturation status of monocytic cells,  defer to morphology.   There is no immunophenotypic evidence of non-Hodgkin lymphoma. Hodgkin   lymphoma cannot be excluded by flow cytometry. T cell lymphomas cannot   always be detected by this flow cytometry  assay. Neoplastic cells,   including large cells, may not survive specimen processing. This sample   may not be representative. Final interpretation requires correlation   with morphologic and clinical features.       Luc [place], MS4  Hematology Medical Student  P: [phone]

## 2017-07-18 ENCOUNTER — APPOINTMENT (OUTPATIENT)
Dept: PET IMAGING | Facility: CLINIC | Age: 57
DRG: 871 | End: 2017-07-18
Attending: INTERNAL MEDICINE
Payer: COMMERCIAL

## 2017-07-18 ENCOUNTER — APPOINTMENT (OUTPATIENT)
Dept: OCCUPATIONAL THERAPY | Facility: CLINIC | Age: 57
DRG: 871 | End: 2017-07-18
Attending: INTERNAL MEDICINE
Payer: COMMERCIAL

## 2017-07-18 LAB
ANION GAP SERPL CALCULATED.3IONS-SCNC: 8 MMOL/L (ref 3–14)
BACTERIA SPEC CULT: NO GROWTH
BACTERIA SPEC CULT: NO GROWTH
BASOPHILS # BLD AUTO: 0 10E9/L (ref 0–0.2)
BASOPHILS NFR BLD AUTO: 1 %
BUN SERPL-MCNC: 30 MG/DL (ref 7–30)
C BURNET PH1 IGG SER QL IF: NORMAL
C BURNET PH2 IGG SER QL IF: NORMAL
CALCIUM SERPL-MCNC: 8.9 MG/DL (ref 8.5–10.1)
CHLORIDE SERPL-SCNC: 103 MMOL/L (ref 94–109)
CO2 SERPL-SCNC: 26 MMOL/L (ref 20–32)
COPATH REPORT: NORMAL
COPATH REPORT: NORMAL
CREAT SERPL-MCNC: 0.67 MG/DL (ref 0.52–1.04)
DIFFERENTIAL METHOD BLD: ABNORMAL
EOSINOPHIL # BLD AUTO: 0 10E9/L (ref 0–0.7)
EOSINOPHIL NFR BLD AUTO: 0.5 %
ERYTHROCYTE [DISTWIDTH] IN BLOOD BY AUTOMATED COUNT: 27.6 % (ref 10–15)
FOLATE SERPL-MCNC: 52.9 NG/ML
GFR SERPL CREATININE-BSD FRML MDRD: NORMAL ML/MIN/1.7M2
GLUCOSE BLDC GLUCOMTR-MCNC: 107 MG/DL (ref 70–99)
GLUCOSE SERPL-MCNC: 90 MG/DL (ref 70–99)
HCT VFR BLD AUTO: 25.7 % (ref 35–47)
HCT VFR BLD AUTO: 27.5 % (ref 35–47)
HGB BLD-MCNC: 8.4 G/DL (ref 11.7–15.7)
IMM GRANULOCYTES # BLD: 0 10E9/L (ref 0–0.4)
IMM GRANULOCYTES NFR BLD: 0.5 %
LYMPHOCYTES # BLD AUTO: 0.5 10E9/L (ref 0.8–5.3)
LYMPHOCYTES NFR BLD AUTO: 22.7 %
MAGNESIUM SERPL-MCNC: 1.8 MG/DL (ref 1.6–2.3)
MCH RBC QN AUTO: 31.1 PG (ref 26.5–33)
MCHC RBC AUTO-ENTMCNC: 32.7 G/DL (ref 31.5–36.5)
MCV RBC AUTO: 95 FL (ref 78–100)
MICRO REPORT STATUS: NORMAL
MICRO REPORT STATUS: NORMAL
MONOCYTES # BLD AUTO: 0.3 10E9/L (ref 0–1.3)
MONOCYTES NFR BLD AUTO: 15.2 %
NEUTROPHILS # BLD AUTO: 1.2 10E9/L (ref 1.6–8.3)
NEUTROPHILS NFR BLD AUTO: 60.1 %
NRBC # BLD AUTO: 0 10*3/UL
NRBC BLD AUTO-RTO: 0 /100
PLATELET # BLD AUTO: 31 10E9/L (ref 150–450)
POTASSIUM SERPL-SCNC: 3.6 MMOL/L (ref 3.4–5.3)
RBC # BLD AUTO: 2.7 10E12/L (ref 3.8–5.2)
SODIUM SERPL-SCNC: 138 MMOL/L (ref 133–144)
SPECIMEN SOURCE: NORMAL
SPECIMEN SOURCE: NORMAL
WBC # BLD AUTO: 2 10E9/L (ref 4–11)

## 2017-07-18 PROCEDURE — 25000125 ZZHC RX 250: Performed by: INTERNAL MEDICINE

## 2017-07-18 PROCEDURE — 34300033 ZZH RX 343

## 2017-07-18 PROCEDURE — 40001004 ZZHCL STATISTIC FLOW INT 9-15 ABY TC 88188: Performed by: INTERNAL MEDICINE

## 2017-07-18 PROCEDURE — 71260 CT THORAX DX C+: CPT

## 2017-07-18 PROCEDURE — 82746 ASSAY OF FOLIC ACID SERUM: CPT | Performed by: INTERNAL MEDICINE

## 2017-07-18 PROCEDURE — 00000146 ZZHCL STATISTIC GLUCOSE BY METER IP

## 2017-07-18 PROCEDURE — 74177 CT ABD & PELVIS W/CONTRAST: CPT

## 2017-07-18 PROCEDURE — 88185 FLOWCYTOMETRY/TC ADD-ON: CPT | Performed by: INTERNAL MEDICINE

## 2017-07-18 PROCEDURE — 85014 HEMATOCRIT: CPT | Performed by: INTERNAL MEDICINE

## 2017-07-18 PROCEDURE — 36415 COLL VENOUS BLD VENIPUNCTURE: CPT | Performed by: INTERNAL MEDICINE

## 2017-07-18 PROCEDURE — A9552 F18 FDG: HCPCS

## 2017-07-18 PROCEDURE — 97535 SELF CARE MNGMENT TRAINING: CPT | Mod: GO | Performed by: OCCUPATIONAL THERAPIST

## 2017-07-18 PROCEDURE — 83735 ASSAY OF MAGNESIUM: CPT | Performed by: INTERNAL MEDICINE

## 2017-07-18 PROCEDURE — 25000132 ZZH RX MED GY IP 250 OP 250 PS 637

## 2017-07-18 PROCEDURE — 25000132 ZZH RX MED GY IP 250 OP 250 PS 637: Performed by: INTERNAL MEDICINE

## 2017-07-18 PROCEDURE — 25000128 H RX IP 250 OP 636: Performed by: INTERNAL MEDICINE

## 2017-07-18 PROCEDURE — 80048 BASIC METABOLIC PNL TOTAL CA: CPT | Performed by: INTERNAL MEDICINE

## 2017-07-18 PROCEDURE — 21400006 ZZH R&B CCU INTERMEDIATE UMMC

## 2017-07-18 PROCEDURE — 99231 SBSQ HOSP IP/OBS SF/LOW 25: CPT | Mod: GC | Performed by: INTERNAL MEDICINE

## 2017-07-18 PROCEDURE — 83883 ASSAY NEPHELOMETRY NOT SPEC: CPT | Performed by: INTERNAL MEDICINE

## 2017-07-18 PROCEDURE — 25000132 ZZH RX MED GY IP 250 OP 250 PS 637: Performed by: STUDENT IN AN ORGANIZED HEALTH CARE EDUCATION/TRAINING PROGRAM

## 2017-07-18 PROCEDURE — 25000128 H RX IP 250 OP 636

## 2017-07-18 PROCEDURE — 83921 ORGANIC ACID SINGLE QUANT: CPT | Performed by: INTERNAL MEDICINE

## 2017-07-18 PROCEDURE — 88184 FLOWCYTOMETRY/ TC 1 MARKER: CPT | Performed by: INTERNAL MEDICINE

## 2017-07-18 PROCEDURE — 40000556 ZZH STATISTIC PERIPHERAL IV START W US GUIDANCE

## 2017-07-18 PROCEDURE — 85025 COMPLETE CBC W/AUTO DIFF WBC: CPT | Performed by: INTERNAL MEDICINE

## 2017-07-18 PROCEDURE — 25000132 ZZH RX MED GY IP 250 OP 250 PS 637: Performed by: NURSE PRACTITIONER

## 2017-07-18 PROCEDURE — 40000133 ZZH STATISTIC OT WARD VISIT: Performed by: OCCUPATIONAL THERAPIST

## 2017-07-18 RX ORDER — FUROSEMIDE 10 MG/ML
40 INJECTION INTRAMUSCULAR; INTRAVENOUS ONCE
Status: COMPLETED | OUTPATIENT
Start: 2017-07-18 | End: 2017-07-18

## 2017-07-18 RX ORDER — IOPAMIDOL 755 MG/ML
1-135 INJECTION, SOLUTION INTRAVASCULAR ONCE
Status: COMPLETED | OUTPATIENT
Start: 2017-07-18 | End: 2017-07-18

## 2017-07-18 RX ADMIN — FLUDEOXYGLUCOSE F-18 10.29 MCI.: 500 INJECTION, SOLUTION INTRAVENOUS at 11:44

## 2017-07-18 RX ADMIN — Medication 1 APPLICATORFUL: at 16:22

## 2017-07-18 RX ADMIN — GABAPENTIN 200 MG: 100 CAPSULE ORAL at 08:30

## 2017-07-18 RX ADMIN — Medication 2 G: at 20:23

## 2017-07-18 RX ADMIN — CALCIUM CARBONATE 600 MG (1,500 MG)-VITAMIN D3 400 UNIT TABLET 2 TABLET: at 08:29

## 2017-07-18 RX ADMIN — Medication 2 G: at 16:00

## 2017-07-18 RX ADMIN — MIDODRINE HYDROCHLORIDE 10 MG: 5 TABLET ORAL at 14:30

## 2017-07-18 RX ADMIN — GABAPENTIN 200 MG: 100 CAPSULE ORAL at 14:30

## 2017-07-18 RX ADMIN — IOPAMIDOL 98 ML: 755 INJECTION, SOLUTION INTRAVENOUS at 13:00

## 2017-07-18 RX ADMIN — MICONAZOLE NITRATE: 2 POWDER TOPICAL at 08:31

## 2017-07-18 RX ADMIN — FUROSEMIDE 40 MG: 10 INJECTION, SOLUTION INTRAVENOUS at 14:30

## 2017-07-18 RX ADMIN — MULTIPLE VITAMINS W/ MINERALS TAB 1 TABLET: TAB at 08:30

## 2017-07-18 RX ADMIN — PANTOPRAZOLE SODIUM 40 MG: 40 TABLET, DELAYED RELEASE ORAL at 06:03

## 2017-07-18 RX ADMIN — ATENOLOL 50 MG: 50 TABLET ORAL at 20:19

## 2017-07-18 RX ADMIN — DIGOXIN 125 MCG: 125 TABLET ORAL at 08:30

## 2017-07-18 RX ADMIN — MIDODRINE HYDROCHLORIDE 10 MG: 5 TABLET ORAL at 18:18

## 2017-07-18 RX ADMIN — PREDNISONE 5 MG: 5 TABLET ORAL at 08:31

## 2017-07-18 RX ADMIN — POTASSIUM CHLORIDE 20 MEQ: 750 TABLET, EXTENDED RELEASE ORAL at 18:25

## 2017-07-18 RX ADMIN — FOLIC ACID 1 MG: 1 TABLET ORAL at 08:30

## 2017-07-18 RX ADMIN — ATENOLOL 50 MG: 50 TABLET ORAL at 08:30

## 2017-07-18 RX ADMIN — MIDODRINE HYDROCHLORIDE 10 MG: 5 TABLET ORAL at 08:29

## 2017-07-18 RX ADMIN — GABAPENTIN 200 MG: 100 CAPSULE ORAL at 20:19

## 2017-07-18 RX ADMIN — THIAMINE HCL (VITAMIN B1) 50 MG TABLET 50 MG: at 08:29

## 2017-07-18 RX ADMIN — MICONAZOLE NITRATE: 2 POWDER TOPICAL at 20:19

## 2017-07-18 NOTE — PLAN OF CARE
Problem: Goal Outcome Summary  Goal: Goal Outcome Summary  OT-6C: Cancel. Pt away at PET scan this PM then declined therapy this PM. Will reschedule.

## 2017-07-18 NOTE — PLAN OF CARE
Problem: Goal Outcome Summary  Goal: Goal Outcome Summary  OT/EDEMA 6C: Pt continues with 2+ spongy pitting edema from knee to groin. After skin cares, LEs measured and Pt decreased in 9/16 circumferential measurements since 7/17/17. Continued Large/20-30mmHg/thigh high Jobst compression stockings to help manage edema. Stockings to be worn during daytime hours as tolerated. Stockings to be removed at night time and replaced with custom velcro garments. *See patient care order for details*. Please discontinue use of compression if increased pain, numbness/tingling or soiling occurs.

## 2017-07-18 NOTE — PLAN OF CARE
Problem: Goal Outcome Summary  Goal: Goal Outcome Summary  PT/6c: cx - pt declines participating in PT this PM.

## 2017-07-18 NOTE — PROGRESS NOTES
Cardiology Daily Note   Date of Service: 7/18/2017  Patient: Amelia Michel  MRN: [id number]  Admission Date: 6/19/2017  Hospital Day # 29    Assessment & Plan:   Amelia Michel is a 56 year old female with PMHx of VSD s/p repair (1969), RA, recently diagnosed atrial fibrillation that was complicated with an OOH cardiac arrest (felt 2/2 long pause with degeneration to VFib while converting Afib to SR with multiple AV prieto agents) who was readmitted from ARU on 6/19 with fever and concern for worsening LUE infection. She has had a complicated medical course, and is now on the  cardiology service as primary since 6/26 for further workup and evaluation of recurrent lactic acidosis, persistent afib and softer blood pressures with tachypnea. Now with concern for malignancy.    Today's Plans  -PET CT scan  -Peripheral flow cytometry  -RBC folate, serum MMA, homocysteine  -Serum free light chains  -Diurese: 40mg IV lasix given    # Fever of unknown origin, last fever 7/12/17  # Concern for malignancy  Previously attributed to LUE wound, though wound has continued to improve without surrounding appearance of cellulitis. Discontinued antibiotics with close monitoring. Summary of work-up -- no DVT on ultrasound 7/5/17, MRI of complete spine without nidus of infection 7/6/17, BRE with vegetations raised possibility of endocarditis, but only 1 major/2 minor Duke's criteria, does not have definitively. Serologies for coxiella and bartonella are in process. CT chest 7/13/17 with possible PNA and elevated procalcitonin, but without symptoms or hemodynamic instability. Bone marrow biopsy is concerning for B-cell lymphoma (see Hem/Onc note for full detail).  - Patient neutropenic, not to degree to warrant antibiotics for fever at present  - Heme/onc following, appreciate recs   - PET-CT    - Peripheral flow cytometry   - RBC folate, serum MMA, homocysteine   - Serum free light chains  - ID consultation, appreciate  recommendations  - Thiamine     # Volume overload/anasarca:  # HFrEF (EF 50-55%):  # Hypotension:  Patient initially up 35# at admission; diuresis challenging, secondary to third spacing of fluid (albumin 1.9), but responded with addition of midodrine and ongoing albumin resuscitation. Right heart cath 6/26 with mildly elevated right and left sided filling presures  - Intermittent IV furosemide; gave 40mg IV lasix on 7/18  - Continue midodrine to 10 mg TID     # CELSA, improving  Likely prerenal with combination of diuresis and NSAID use for FUO. Also consider contrast injury given CT chest.   - D/C naproxen     # Atrial fibrillation/flutter with RVR:  RVR in setting of volume overload above. Patient rec'd single dose of diltiazem with associated soft BPs.   - Continue dig 125mcg  - Atenolol 50 mg BID    - Not anticoagulated due to thrombocytopenia    # Out of hospital arrest:  Pt will need to wear LifeVest until medically appropriate and cleared from infection/wound standpoint for ICD implantation likely 4-6 weeks after discharge per EP (pending hospital course for LUE wound).       # Acute on Chronic Thrombocytopenia:   Plts noted to decrease from 104 to as low as 15 during recent hospitalization requiring transfusion w/ chronically low in the 's as outpatient. Possible bactrim was offending agent for exacerbation, and discontinued this medication (exchanged for meropenem in the setting of worsening cellulitis).   - If this is chronic ITP, no need for treatment unless plts drift < 10s if not bleeding  - Continue to hold RA meds for now  - Holding apixiban (last dose 7/8/17)    # Acute on chronic anemia: Stable  No evidence of overt bleeding. Hgb 6.6 on 7/16/17 likely dilutional with all lines down and ongoing resuscitation with albumin, NS.   - Daily CBC  - 1 U RBCs 7/16/17    # RA:   History of seropositive rheumatoid arthritis (anti-CCP positive) since late 1980;s w/ multiple MTP osteotomies, partial right  wrist fusion, longstanding therapy consisting of MTX, prednisone and HCQ  - Tapered to 5 mg prednisone on 7/17 with continued monitoring for flaring  - Rheumatology following  - Continue to hold HCQ and MTX until outpatient follow-up  - Follow-up with [facility] Dr. Conor Cunha in 4-6 weeks after discharge      # Severe Critical Illness Myopathy:   Significant deconditioning w/ median neuropathies most likely localized to the wrists and ulnar neuropathies most likely localized to the elbows secondary to RA.  - Ongoing extensive PT/OT/SLP    Consulting Services: Nutrition, heme/onc, ID, rheumatology    CODE: Full Code  DVT Ppx: being held due to thrombocytopenia  Diet/Fluids: As above.  Disposition: Pending improvement in above.    Pt's care was discussed with bedside RN and patient during Care Team Rounds.    Patient was discussed and evaluated with Dr. Mcguire.    Mike Alicia MD  Internal Medicine Resident, PGY2  [phone]    CARDIOLOGY STAFF  Patient seen and examined by me.  History and physical examination discussed with Dr. Alicia whose note reflects our joint assessment and recommendation/plans.  We are managing Ms. Michel's acute on chronic diastolic heart failure with diuretic and with intermittent albumin to mobilize third spacing.  At the same time we are evaluating complex medical issues, particularly an inflammatory state that may represent malignancy.  Whole body PET was obtained today, preliminary report pending.  Richy Mcguire    ___________________________________________________________________    Subjective & Interval History:   -nursing notes reviewed  -chart reviewed  -no acute events over night  -patient feels well this morning  -she is going down to PET-CT today  -no fevers, chills, SOB more than normal    Medications: Reviewed in EPIC. List below for reference    Physical Exam:    Blood pressure 108/67, pulse 77, temperature 98  F (36.7  C), temperature source Oral,  "resp. rate 17, height 1.48 m (4' 10.25\"), weight 73.6 kg (162 lb 3.2 oz), SpO2 98 %, not currently breastfeeding.    General: Lying in bed. Alert and oriented completely. No acute distress   HEENT: PERRL. EOM intact. Sclera non-icteric. Oral mucosa pink and moist.  Resp: Diminished breath sounds posteriorly  CV: Regular rate and rhythm. Crisp S1 and S2. No murmurs, rubs nor gallops   Abdominal: Soft, nondistended. Nontender to palpation. No peritoneal signs. BS+  t and bilaterally equal. 2+ LE edema, 2+ sacral edema as well  Neurological: CN's II-XII grossly intact. Normal tactile sensation.    Labs & Studies of Note: Reviewed in Epic  CMP    Recent Labs  Lab 07/18/17  0753 07/17/17  0706 07/16/17  2121 07/16/17  0734 07/15/17  0803 07/14/17  0746  07/13/17  0744 07/12/17  0641    139  --  138 136 136  < > 137 134   POTASSIUM 3.6 4.0 4.0 4.1 4.4 4.3  < > 4.0 4.0   CHLORIDE 103 104  --  104 105 101  < > 102 99   CO2 26 27  --  26 24 26  < > 27 25   ANIONGAP 8 8  --  8 8 9  < > 8 10   GLC 90 80  --  85 100* 92  < > 105* 103*   BUN 30 32*  --  37* 42* 40*  < > 36* 28   CR 0.67 0.70  --  0.85 0.99 1.09*  < > 0.80 0.67   GFRESTIMATED >90Non  GFR Calc 86  --  69 58* 52*  < > 74 >90Non  GFR Calc   GFRESTBLACK >90African American GFR Calc >90African American GFR Calc  --  84 70 63  < > 90 >90African American GFR Calc   VIKTORIYA 8.9 9.1  --  9.1 8.5 8.6  < > 8.5 8.2*   MAG 1.8  --   --   --   --   --   --  2.3 1.9   PROTTOTAL  --   --   --  5.9* 5.3*  --   --   --   --    ALBUMIN  --   --   --  3.8 2.5* 2.5*  --   --   --    BILITOTAL  --   --   --  1.7* 0.9  --   --   --   --    ALKPHOS  --   --   --  127 116  --   --   --   --    AST  --   --   --  56* 41  --   --   --   --    ALT  --   --   --  37 34  --   --   --   --    < > = values in this interval not displayed.    CBC    Recent Labs  Lab 07/18/17  0753 07/17/17  0706 07/16/17  0734 07/15/17  0803   WBC 2.0* 2.2* 2.0* 2.8*   RBC 2.70* " 2.94* 2.12* 2.60*   HGB 8.4* 8.8* 6.6* 8.3*   HCT 25.7* 27.9* 20.6* 24.9*   PLT 31* 34* 36* 42*     INR    Recent Labs  Lab 07/16/17  0734   INR 1.41*     Medication List for Reference (delete if desired)  Current Facility-Administered Medications   Medication     sodium chloride (PF) 0.9% PF flush 1-74 mL     acetaminophen (TYLENOL) tablet 650 mg     predniSONE (DELTASONE) tablet 5 mg     thiamine tablet 50 mg     folic acid (FOLVITE) tablet 1 mg     atenolol (TENORMIN) tablet 50 mg     midodrine (PROAMATINE) tablet 10 mg     miconazole (MICATIN; MICRO GUARD) 2 % powder     calcium carbonate (TUMS) chewable tablet 500 mg     pantoprazole (PROTONIX) EC tablet 40 mg     digoxin (LANOXIN) tablet 125 mcg     ondansetron (ZOFRAN) tablet 4 mg     prochlorperazine (COMPAZINE) injection 5-10 mg     ondansetron (ZOFRAN) injection 4 mg     simethicone (MYLICON) chewable tablet 80 mg     sodium chloride (PF) 0.9% PF flush 10-20 mL     sodium chloride (PF) 0.9% PF flush 10 mL     heparin lock flush 10 UNIT/ML injection 5-10 mL     dextrose 10 % 1,000 mL infusion     gabapentin (NEURONTIN) capsule 200 mg     melatonin tablet 3 mg     morphine 0.1% in solosite topical gel 2 g     multivitamin, therapeutic with minerals (THERA-VIT-M) tablet 1 tablet     lidocaine 1 % 1 mL     lidocaine (LMX4) kit     sodium chloride (PF) 0.9% PF flush 3 mL     medication instruction     acetaminophen (TYLENOL) Suppository 650 mg     Patient is already receiving anticoagulation with heparin, enoxaparin (LOVENOX), warfarin (COUMADIN)  or other anticoagulant medication     glucose 40 % gel 15-30 g    Or     dextrose 50 % injection 25-50 mL    Or     glucagon injection 1 mg     naloxone (NARCAN) injection 0.1-0.4 mg     calcium-vitamin D (CALTRATE) 600-400 MG-UNIT per tablet 2 tablet     morphine 0.1% in solosite topical gel     potassium chloride SA (K-DUR/KLOR-CON M) CR tablet 20-40 mEq     potassium chloride (KLOR-CON) Packet 20-40 mEq      potassium chloride 10 mEq in 100 mL intermittent infusion     potassium chloride 10 mEq in 100 mL intermittent infusion with 10 mg lidocaine     potassium chloride 20 mEq in 50 mL intermittent infusion     magnesium sulfate 2 g in NS intermittent infusion (PharMEDium or FV Cmpd)     magnesium sulfate 4 g in 100 mL sterile water (premade)     sodium phosphate 10 mmol in D5W intermittent infusion     sodium phosphate 15 mmol in D5W intermittent infusion     sodium phosphate 20 mmol in D5W intermittent infusion     sodium phosphate 25 mmol in D5W intermittent infusion     melatonin tablet 1 mg     traMADol (ULTRAM) half-tab 25 mg

## 2017-07-18 NOTE — PROGRESS NOTES
"Social Work Services Progress Note    Hospital Day: 30  Date of Initial Social Work Evaluation:  6/20/17  Collaborated with:  Pt and Spouse    Data:  Pt is a 56 year old female who was transferred from [facility].  SW following for placement back at [facility] as able.    Intervention:  SW met with pt and spouse Romeo to check in on pt's needs.  At this time pt states she is \"doing ok\" and does not have needs for SW.  Spouse Romeo stating he may want help with updating her disability forms for work pending the results of the PET scan.  Pt states that her hopes are the scan is negative and she has some worries about what the tests will show.  SW to continue following up with pt on her needs throughout her hospitalization.  [facility]    Assessment:  No change, spouse continues to provide daily cares at bedside.  Pt and family aware of how to contact SW for needs.    Plan:    Anticipated Disposition:  Facility:  Return to [facility] as able.    Barriers to d/c plan:  Medical stability, results of PET scan to determine further medical care needed.    Follow Up:  SW follow for discharge planning to [facility].    DENNIS Larsen, APSW  [address] Unit   Phone: [phone]  Pager: [phone]  Unit: [phone]      ___________________________________________________________________________________________________________________________________________________    Referrals in process:   [facility] - return when medically stable.    Referrals Discontinued:  None    Community Case Management/Community Services in place:   None              "

## 2017-07-18 NOTE — PLAN OF CARE
Problem: Goal Outcome Summary  Goal: Goal Outcome Summary     D: Patient admitted from ARU with fevers and LUE pain/infection.  I: Dressing change to LUE per orders. Removed JOBST stockings and paged cross cover for atarax for itchy legs (patient says they always itch with stocking removal.) Urine incontinence, briefs weighed and changed PRN. One BM on bedpan this shift. Turns in bed.  A: Lactic acid protocol activated and resulted 3.0. Cross cover notified, no interventions, patient asymptomatic. Afib 100s, BPs stable, afebrile,  2L NC appears SOB with conversation, but patient says this is 'much better' and continues to improve. Generalized edema. Cooperative with turns in bed.  P: Monitor fluid and respiratory status,notify cards 1 with any changes.

## 2017-07-18 NOTE — PLAN OF CARE
"Problem: Goal Outcome Summary  Goal: Goal Outcome Summary  Outcome: No Change  /79 (BP Location: Right arm)  Pulse 90  Temp 99  F (37.2  C) (Oral)  Resp 20  Ht 1.48 m (4' 10.25\")  Wt 75 kg (165 lb 4.8 oz)  SpO2 97%  BMI 34.25 kg/m2     A&Ox4. T max 99.6. AOVSS on room 2L oxygen via nasal cannula. A fib/A flutter 90's-100's.Up with assist of 1 and walker. Incontinent of urine. PIV saline locked. No c/o pain or nausea. Generalized edema, compression stockings on. Turned/repositioned q2h. LUE dressing intact. She appeared to rest comfortably between cares. Will continue to monitor and follow POC.       "

## 2017-07-19 LAB
ANION GAP SERPL CALCULATED.3IONS-SCNC: 10 MMOL/L (ref 3–14)
BASOPHILS # BLD AUTO: 0 10E9/L (ref 0–0.2)
BASOPHILS NFR BLD AUTO: 0.8 %
BUN SERPL-MCNC: 30 MG/DL (ref 7–30)
CALCIUM SERPL-MCNC: 8.7 MG/DL (ref 8.5–10.1)
CHLORIDE SERPL-SCNC: 101 MMOL/L (ref 94–109)
CO2 SERPL-SCNC: 26 MMOL/L (ref 20–32)
COPATH REPORT: NORMAL
CREAT SERPL-MCNC: 0.71 MG/DL (ref 0.52–1.04)
DIFFERENTIAL METHOD BLD: ABNORMAL
EOSINOPHIL # BLD AUTO: 0 10E9/L (ref 0–0.7)
EOSINOPHIL NFR BLD AUTO: 0 %
ERYTHROCYTE [DISTWIDTH] IN BLOOD BY AUTOMATED COUNT: 27.7 % (ref 10–15)
GFR SERPL CREATININE-BSD FRML MDRD: 85 ML/MIN/1.7M2
GLUCOSE SERPL-MCNC: 98 MG/DL (ref 70–99)
HCT VFR BLD AUTO: 26.2 % (ref 35–47)
HGB BLD-MCNC: 8.6 G/DL (ref 11.7–15.7)
IMM GRANULOCYTES # BLD: 0 10E9/L (ref 0–0.4)
IMM GRANULOCYTES NFR BLD: 0.4 %
KAPPA LC UR-MCNC: 3.35 MG/DL (ref 0.33–1.94)
KAPPA LC/LAMBDA SER: 0.87 {RATIO} (ref 0.26–1.65)
LAMBDA LC SERPL-MCNC: 3.87 MG/DL (ref 0.57–2.63)
LYMPHOCYTES # BLD AUTO: 0.5 10E9/L (ref 0.8–5.3)
LYMPHOCYTES NFR BLD AUTO: 19.8 %
MAGNESIUM SERPL-MCNC: 2 MG/DL (ref 1.6–2.3)
MCH RBC QN AUTO: 31.4 PG (ref 26.5–33)
MCHC RBC AUTO-ENTMCNC: 32.8 G/DL (ref 31.5–36.5)
MCV RBC AUTO: 96 FL (ref 78–100)
MONOCYTES # BLD AUTO: 0.5 10E9/L (ref 0–1.3)
MONOCYTES NFR BLD AUTO: 20.2 %
NEUTROPHILS # BLD AUTO: 1.5 10E9/L (ref 1.6–8.3)
NEUTROPHILS NFR BLD AUTO: 58.8 %
NRBC # BLD AUTO: 0 10*3/UL
NRBC BLD AUTO-RTO: 0 /100
PLATELET # BLD AUTO: 29 10E9/L (ref 150–450)
POTASSIUM SERPL-SCNC: 3.8 MMOL/L (ref 3.4–5.3)
RBC # BLD AUTO: 2.74 10E12/L (ref 3.8–5.2)
SODIUM SERPL-SCNC: 136 MMOL/L (ref 133–144)
WBC # BLD AUTO: 2.6 10E9/L (ref 4–11)

## 2017-07-19 PROCEDURE — 21400006 ZZH R&B CCU INTERMEDIATE UMMC

## 2017-07-19 PROCEDURE — 25000132 ZZH RX MED GY IP 250 OP 250 PS 637: Performed by: INTERNAL MEDICINE

## 2017-07-19 PROCEDURE — 25000132 ZZH RX MED GY IP 250 OP 250 PS 637: Performed by: NURSE PRACTITIONER

## 2017-07-19 PROCEDURE — 25000128 H RX IP 250 OP 636: Performed by: INTERNAL MEDICINE

## 2017-07-19 PROCEDURE — 25000125 ZZHC RX 250: Performed by: INTERNAL MEDICINE

## 2017-07-19 PROCEDURE — 25000132 ZZH RX MED GY IP 250 OP 250 PS 637: Performed by: STUDENT IN AN ORGANIZED HEALTH CARE EDUCATION/TRAINING PROGRAM

## 2017-07-19 PROCEDURE — 80048 BASIC METABOLIC PNL TOTAL CA: CPT | Performed by: INTERNAL MEDICINE

## 2017-07-19 PROCEDURE — 25000132 ZZH RX MED GY IP 250 OP 250 PS 637

## 2017-07-19 PROCEDURE — 99231 SBSQ HOSP IP/OBS SF/LOW 25: CPT | Mod: GC | Performed by: INTERNAL MEDICINE

## 2017-07-19 PROCEDURE — 85025 COMPLETE CBC W/AUTO DIFF WBC: CPT | Performed by: INTERNAL MEDICINE

## 2017-07-19 PROCEDURE — 36415 COLL VENOUS BLD VENIPUNCTURE: CPT | Performed by: INTERNAL MEDICINE

## 2017-07-19 PROCEDURE — 83735 ASSAY OF MAGNESIUM: CPT | Performed by: INTERNAL MEDICINE

## 2017-07-19 PROCEDURE — 99212 OFFICE O/P EST SF 10 MIN: CPT

## 2017-07-19 RX ORDER — POTASSIUM CHLORIDE 1500 MG/1
40 TABLET, EXTENDED RELEASE ORAL ONCE
Status: COMPLETED | OUTPATIENT
Start: 2017-07-19 | End: 2017-07-19

## 2017-07-19 RX ORDER — FUROSEMIDE 10 MG/ML
40 INJECTION INTRAMUSCULAR; INTRAVENOUS ONCE
Status: COMPLETED | OUTPATIENT
Start: 2017-07-19 | End: 2017-07-19

## 2017-07-19 RX ADMIN — Medication 2 G: at 08:45

## 2017-07-19 RX ADMIN — CALCIUM CARBONATE 600 MG (1,500 MG)-VITAMIN D3 400 UNIT TABLET 2 TABLET: at 08:46

## 2017-07-19 RX ADMIN — ACETAMINOPHEN 650 MG: 325 TABLET, FILM COATED ORAL at 22:03

## 2017-07-19 RX ADMIN — PANTOPRAZOLE SODIUM 40 MG: 40 TABLET, DELAYED RELEASE ORAL at 06:28

## 2017-07-19 RX ADMIN — POTASSIUM CHLORIDE 20 MEQ: 750 TABLET, EXTENDED RELEASE ORAL at 11:55

## 2017-07-19 RX ADMIN — MIDODRINE HYDROCHLORIDE 10 MG: 5 TABLET ORAL at 08:45

## 2017-07-19 RX ADMIN — POTASSIUM CHLORIDE 40 MEQ: 1500 TABLET, EXTENDED RELEASE ORAL at 11:54

## 2017-07-19 RX ADMIN — PREDNISONE 5 MG: 5 TABLET ORAL at 08:45

## 2017-07-19 RX ADMIN — MICONAZOLE NITRATE: 2 POWDER TOPICAL at 20:14

## 2017-07-19 RX ADMIN — ATENOLOL 50 MG: 50 TABLET ORAL at 20:15

## 2017-07-19 RX ADMIN — Medication: at 10:39

## 2017-07-19 RX ADMIN — ATENOLOL 50 MG: 50 TABLET ORAL at 08:45

## 2017-07-19 RX ADMIN — MIDODRINE HYDROCHLORIDE 10 MG: 5 TABLET ORAL at 18:20

## 2017-07-19 RX ADMIN — MIDODRINE HYDROCHLORIDE 10 MG: 5 TABLET ORAL at 11:53

## 2017-07-19 RX ADMIN — MULTIPLE VITAMINS W/ MINERALS TAB 1 TABLET: TAB at 08:46

## 2017-07-19 RX ADMIN — THIAMINE HCL (VITAMIN B1) 50 MG TABLET 50 MG: at 08:45

## 2017-07-19 RX ADMIN — DIGOXIN 125 MCG: 125 TABLET ORAL at 08:46

## 2017-07-19 RX ADMIN — FUROSEMIDE 40 MG: 10 INJECTION, SOLUTION INTRAVENOUS at 11:53

## 2017-07-19 RX ADMIN — GABAPENTIN 200 MG: 100 CAPSULE ORAL at 22:03

## 2017-07-19 RX ADMIN — FOLIC ACID 1 MG: 1 TABLET ORAL at 08:45

## 2017-07-19 RX ADMIN — MICONAZOLE NITRATE: 2 POWDER TOPICAL at 08:46

## 2017-07-19 RX ADMIN — GABAPENTIN 200 MG: 100 CAPSULE ORAL at 14:28

## 2017-07-19 RX ADMIN — GABAPENTIN 200 MG: 100 CAPSULE ORAL at 08:46

## 2017-07-19 NOTE — PLAN OF CARE
"Problem: Goal Outcome Summary  Goal: Goal Outcome Summary  Outcome: Improving  /67 (BP Location: Right arm)  Pulse 77  Temp 98  F (36.7  C) (Oral)  Resp 17  Ht 1.48 m (4' 10.25\")  Wt 73.6 kg (162 lb 3.2 oz)  SpO2 98%  BMI 33.61 kg/m2     Pt AOx4, VSS on 2L O2 nasal canula, chronic afib.  Assist of 1, with walker and gait belt.  Urinary incontinent.  No pain through the day reported.  Dressing change on left arm extravasation completed.       PET CT scan completed today.  Lymph rap consulted.  Pt refused OT/PT today post procedure stating preference for rest.  New rt 20 gauge PIV placed prior to PET CT, previous 22 gauge failed to give blood return and was 10 days old.     Pt Wolf was bedside through the morning and requested notes from Hematology and Cards.  He and Bree are health care professionals and have preference for full details.     Continue with plan of care, work with PT/OT tomorrow.            "

## 2017-07-19 NOTE — PLAN OF CARE
Problem: Goal Outcome Summary  Goal: Goal Outcome Summary     D: Admitted from ARU with fevers and LUE pain. Concern now is for malignancy.  I: Incontinent brief changes PRN (urinary incontinence ongoing, worsened with IV lasix). Turns in bed. LE stocking protocol day vs night.  A: Afib, 2L, BPs stable, denies pain. Pleasant, cooperative. Eager to learn PET scan results.  P: Continue with therapies.

## 2017-07-19 NOTE — PROGRESS NOTES
Cardiology Daily Note   Date of Service: 7/19/2017  Patient: Amelia Michel  MRN: [id number]  Admission Date: 6/19/2017  Hospital Day # 30    Assessment & Plan:   Amelia Michel is a 56 year old female with PMHx of VSD s/p repair (1969), RA, recently diagnosed atrial fibrillation that was complicated with an OOH cardiac arrest (felt 2/2 long pause with degeneration to VFib while converting Afib to SR with multiple AV prieto agents) who was readmitted from ARU on 6/19 with fever and concern for worsening LUE infection. She has had a complicated medical course, and is now on the  cardiology service as primary since 6/26 for further workup and evaluation of recurrent lactic acidosis, persistent afib and softer blood pressures with tachypnea. Now with concern for malignancy.    Today's Plans  -PET CT scan read back, findings consistent with extensive lymphoma  -Need to discuss with Hem/Onc on how to interpret these results  -Diurese: 40mg IV lasix given on 7/19    # Fever of unknown origin, last fever 7/12/17  # Concern for malignancy  Previously attributed to LUE wound, though wound has continued to improve without surrounding appearance of cellulitis. Discontinued antibiotics with close monitoring. Summary of work-up -- no DVT on ultrasound 7/5/17, MRI of complete spine without nidus of infection 7/6/17, BRE with vegetations raised possibility of endocarditis, but only 1 major/2 minor Duke's criteria, does not have definitively. Serologies for coxiella and bartonella are in process. CT chest 7/13/17 with possible PNA and elevated procalcitonin, but without symptoms or hemodynamic instability. Bone marrow biopsy is concerning for B-cell lymphoma (see Hem/Onc note for full detail).  - Patient neutropenic, not to degree to warrant antibiotics for fever at present  - PET-Scan read back, findings consistent with extensive lymphoma  - Heme/onc following, appreciate recs   - Peripheral flow cytometry pending   -  RBC folate normal   - serum MMA, homocysteine pending  - ID consultation, appreciate recommendations  - Thiamine     # Volume overload/anasarca:  # HFrEF (EF 50-55%):  # Hypotension:  Patient initially up 35# at admission; diuresis challenging, secondary to third spacing of fluid (albumin 1.9), but responded with addition of midodrine and ongoing albumin resuscitation. Right heart cath 6/26 with mildly elevated right and left sided filling presures  - Intermittent IV furosemide; gave 40mg IV lasix on 7/18  - Continue midodrine to 10 mg TID     # CELSA, improving  Likely prerenal with combination of diuresis and NSAID use for FUO. Also consider contrast injury given CT chest.   - D/C naproxen     # Atrial fibrillation/flutter with RVR:  RVR in setting of volume overload above. Patient rec'd single dose of diltiazem with associated soft BPs.   - Continue dig 125mcg  - Atenolol 50 mg BID    - Not anticoagulated due to thrombocytopenia    # Out of hospital arrest:  Pt will need to wear LifeVest until medically appropriate and cleared from infection/wound standpoint for ICD implantation likely 4-6 weeks after discharge per EP (pending hospital course for LUE wound).       # Acute on Chronic Thrombocytopenia:   Plts noted to decrease from 104 to as low as 15 during recent hospitalization requiring transfusion w/ chronically low in the 's as outpatient. Possible bactrim was offending agent for exacerbation, and discontinued this medication (exchanged for meropenem in the setting of worsening cellulitis).   - If this is chronic ITP, no need for treatment unless plts drift < 10s if not bleeding  - Continue to hold RA meds for now  - Holding apixiban (last dose 7/8/17)    # Acute on chronic anemia: Stable  No evidence of overt bleeding. Hgb 6.6 on 7/16/17 likely dilutional with all lines down and ongoing resuscitation with albumin, NS.   - Daily CBC  - 1 U RBCs 7/16/17    # RA:   History of seropositive rheumatoid  arthritis (anti-CCP positive) since late 1980;s w/ multiple MTP osteotomies, partial right wrist fusion, longstanding therapy consisting of MTX, prednisone and HCQ  - Tapered to 5 mg prednisone on 7/17 with continued monitoring for flaring  - Rheumatology following  - Continue to hold HCQ and MTX until outpatient follow-up  - Follow-up with [facility] Dr. Conor Cunha in 4-6 weeks after discharge      # Severe Critical Illness Myopathy:   Significant deconditioning w/ median neuropathies most likely localized to the wrists and ulnar neuropathies most likely localized to the elbows secondary to RA.  - Ongoing extensive PT/OT/SLP    Consulting Services: Nutrition, heme/onc, ID, rheumatology    CODE: Full Code  DVT Ppx: being held due to thrombocytopenia  Diet/Fluids: As above.  Disposition: Pending improvement in above.    Pt's care was discussed with bedside RN and patient during Care Team Rounds.    Patient was discussed and evaluated with Dr. Mcguire.    Mike Alicia MD  Internal Medicine Resident, PGY2  [phone]    CARDIOLOGY STAFF  Patient seen and examined by me.  History and physical examination discussed with Dr. Alicia whose note reflects our joint assessment and recommendation/plans.  We are continuing gentle diuresis for acute on chronic diastolic heart failure.  Results of PET scan and input from Hematology this afternoon are apparently most consistent with diffuse B cell lymphoma.  Hematology is conferring with patient about treatment options.  Richy Mcguire        ___________________________________________________________________    Subjective & Interval History:   -nursing notes reviewed  -chart reviewed  -fever to 100.6 this AM; spontaneously resolved  -patient feels well this morning  -no fevers, chills, SOB more than normal    Medications: Reviewed in EPIC. List below for reference    Physical Exam:    Blood pressure 115/67, pulse 77, temperature 98.9  F (37.2  C),  "temperature source Oral, resp. rate 16, height 1.48 m (4' 10.25\"), weight 72.5 kg (159 lb 12.8 oz), SpO2 94 %, not currently breastfeeding.    General: Lying in bed. No acute distress.  at bedside  Resp: Diminished breath sounds posteriorly; clear anteriorly  CV: Regular rate and rhythm. Crisp S1 and S2. No murmurs, rubs nor gallops   Abdominal: Soft, nondistended. Nontender to palpation. No peritoneal signs. BS+  t and bilaterally equal. 2+ LE edema, 2+ sacral edema as well  Neurological: CN's II-XII grossly intact. Alert and oriented completely.    Labs & Studies of Note: Reviewed in Epic    PET-Scan  IMPRESSION: In this patient with history of abnormal bone marrow  biopsy and clinical suspicion of lymphoma;      1. Findings suggesting extensive lymphoma including;    a. Multiple hypermetabolic mediastinal and single left neck level 2A  lymphadenopathy.    b. Extensive FDG avid bony lesions throughout the axial and  appendicular skeleton. No bone fracture or spinal canal involvement.     c. Numerous hepatic FDG avid lesions.    d. Findings suspicious for cardiac involvement as evidenced by  thickening of the interatrial septum with associated increased FDG  uptake. This can be further evaluated with echocardiogram.    e. FDG avid lesion in the pelvis contiguous with the uterus,  suspicious for uterine involvement.  2. Stable bilateral pleural effusions with associated compression  atelectasis.  3. Mild to moderate ascites. Diffuse soft tissue anasarca.  4. Splenomegaly without associated FDG uptake.    CMP    Recent Labs  Lab 07/19/17  0721 07/18/17  0753 07/17/17  0706 07/16/17  2121 07/16/17  0734 07/15/17  0803 07/14/17  0746  07/13/17  0744    138 139  --  138 136 136  < > 137   POTASSIUM 3.8 3.6 4.0 4.0 4.1 4.4 4.3  < > 4.0   CHLORIDE 101 103 104  --  104 105 101  < > 102   CO2 26 26 27  --  26 24 26  < > 27   ANIONGAP 10 8 8  --  8 8 9  < > 8   GLC 98 90 80  --  85 100* 92  < > 105*   BUN 30 30 " 32*  --  37* 42* 40*  < > 36*   CR 0.71 0.67 0.70  --  0.85 0.99 1.09*  < > 0.80   GFRESTIMATED 85 >90Non  GFR Calc 86  --  69 58* 52*  < > 74   GFRESTBLACK >90African American GFR Calc >90African American GFR Calc >90African American GFR Calc  --  84 70 63  < > 90   VIKTORIYA 8.7 8.9 9.1  --  9.1 8.5 8.6  < > 8.5   MAG 2.0 1.8  --   --   --   --   --   --  2.3   PROTTOTAL  --   --   --   --  5.9* 5.3*  --   --   --    ALBUMIN  --   --   --   --  3.8 2.5* 2.5*  --   --    BILITOTAL  --   --   --   --  1.7* 0.9  --   --   --    ALKPHOS  --   --   --   --  127 116  --   --   --    AST  --   --   --   --  56* 41  --   --   --    ALT  --   --   --   --  37 34  --   --   --    < > = values in this interval not displayed.    CBC    Recent Labs  Lab 07/19/17  0721 07/18/17  1944 07/18/17  0753 07/17/17  0706 07/16/17  0734   WBC 2.6*  --  2.0* 2.2* 2.0*   RBC 2.74*  --  2.70* 2.94* 2.12*   HGB 8.6*  --  8.4* 8.8* 6.6*   HCT 26.2* 27.5* 25.7* 27.9* 20.6*   PLT 29*  --  31* 34* 36*     INR    Recent Labs  Lab 07/16/17  0734   INR 1.41*     Medication List for Reference (delete if desired)  Current Facility-Administered Medications   Medication     sodium chloride (PF) 0.9% PF flush 1-74 mL     acetaminophen (TYLENOL) tablet 650 mg     predniSONE (DELTASONE) tablet 5 mg     thiamine tablet 50 mg     folic acid (FOLVITE) tablet 1 mg     atenolol (TENORMIN) tablet 50 mg     midodrine (PROAMATINE) tablet 10 mg     miconazole (MICATIN; MICRO GUARD) 2 % powder     calcium carbonate (TUMS) chewable tablet 500 mg     pantoprazole (PROTONIX) EC tablet 40 mg     digoxin (LANOXIN) tablet 125 mcg     ondansetron (ZOFRAN) tablet 4 mg     prochlorperazine (COMPAZINE) injection 5-10 mg     ondansetron (ZOFRAN) injection 4 mg     simethicone (MYLICON) chewable tablet 80 mg     sodium chloride (PF) 0.9% PF flush 10-20 mL     sodium chloride (PF) 0.9% PF flush 10 mL     heparin lock flush 10 UNIT/ML injection 5-10 mL     dextrose 10  % 1,000 mL infusion     gabapentin (NEURONTIN) capsule 200 mg     melatonin tablet 3 mg     morphine 0.1% in solosite topical gel 2 g     multivitamin, therapeutic with minerals (THERA-VIT-M) tablet 1 tablet     lidocaine 1 % 1 mL     lidocaine (LMX4) kit     sodium chloride (PF) 0.9% PF flush 3 mL     medication instruction     acetaminophen (TYLENOL) Suppository 650 mg     Patient is already receiving anticoagulation with heparin, enoxaparin (LOVENOX), warfarin (COUMADIN)  or other anticoagulant medication     glucose 40 % gel 15-30 g    Or     dextrose 50 % injection 25-50 mL    Or     glucagon injection 1 mg     naloxone (NARCAN) injection 0.1-0.4 mg     calcium-vitamin D (CALTRATE) 600-400 MG-UNIT per tablet 2 tablet     morphine 0.1% in solosite topical gel     potassium chloride SA (K-DUR/KLOR-CON M) CR tablet 20-40 mEq     potassium chloride (KLOR-CON) Packet 20-40 mEq     potassium chloride 10 mEq in 100 mL intermittent infusion     potassium chloride 10 mEq in 100 mL intermittent infusion with 10 mg lidocaine     potassium chloride 20 mEq in 50 mL intermittent infusion     magnesium sulfate 2 g in NS intermittent infusion (PharMEDium or FV Cmpd)     magnesium sulfate 4 g in 100 mL sterile water (premade)     sodium phosphate 10 mmol in D5W intermittent infusion     sodium phosphate 15 mmol in D5W intermittent infusion     sodium phosphate 20 mmol in D5W intermittent infusion     sodium phosphate 25 mmol in D5W intermittent infusion     melatonin tablet 1 mg     traMADol (ULTRAM) half-tab 25 mg

## 2017-07-19 NOTE — PLAN OF CARE
Problem: Goal Outcome Summary  Goal: Goal Outcome Summary  Outcome: No Change  VSS. Denies pain. Afib. Slept well. Incontinence as per baseline. On O2 at 2LPM.

## 2017-07-19 NOTE — PROGRESS NOTES
[facility] Nurse Inpatient Wound Assessment     Re Assessment of wound(s) on pt's:   Left arm    Data:     Patient History:      per MD note(s):  56 year old woman with hx of VSD repair (1969), Rheumatoid arthritis, and a recent diagnosis of Afib/Aflutter/SVT, who presented to [facility] on 5/29 after Out of Hospital cardiac arrest with shockable rhythm. After ROSC in the field, she was admitted from 5/29-6/15, admitted to ARU 6/15.  admitted from ARU on 6/19 with fever and concern for worsening LUE infection.  She is now transferred back to cardiology service as primary on 6/26 for further workup and evaluation of recurrent lactic acidosis, persistent afib and softer blood pressures with tachypnea.    Continues to spike fevers, previously attributes to LUE wound, though patient has continued to have fevers despite broad spectrum antibiotics. ID consulted and feels that unlikely secondary to infection     left arm extravasation site    Moisture Management:  Dressings    Current Diet / Nutrition:         Active Diet Order      2 Gram Sodium Diet        Jay Score  Avg: 15.6  Min: 9  Max: 23     Recent Labs   Lab Test  07/19/17   0721   07/16/17   0734   07/14/17   0746   05/31/17   1018   ALBUMIN   --    --   3.8   < >  2.5*   < >  1.9*   HGB  8.6*   < >  6.6*   < >  7.8*   < >  8.3*   INR   --    --   1.41*   --    --    < >  3.38*   WBC  2.6*   < >  2.0*   < >  2.2*   < >  7.5   A1C   --    --    --    --    --    --   Canceled, Test credited   UNABLE TO CALCULATE % HBA1C DUE TO LOW HGB AND/OR LOW HGBA1C  NOTIFIED CHELSEA BEE RN AT 1253 ON 5/31/17 [facility]     CRP   --    --    --    --   83.0*   < >   --     < > = values in this interval not displayed.         Wound Assessment :   Left arm  Wound History:  Extravasation wound with large area of epidermal loss                              7/12/17 7/19/17      Wound Base: 80% adherent tan slough/necrosis  surrounded by 10% thin yellow slough and 10% granulation.      Specific Dimensions (length x width x depth, in cm) :   Total area 9.5 x 2.5, 3 separate wounds now:     0.6 x 0.7 x 0.2, 3.2 x 2.3 x 0.8 and 3.5 x 1.5 x 0.6    Palpation of the wound bed:   Boggy     Periwound Skin: intact, minimal pink erythema     Drainage:  .Amount: moderate . Color: thick yellow liquefying slough and thin yellow serous     Odor: none  Pain:  Minimal, increases without the use of morphine gel.        Intervention:     Patient's chart evaluated.      Wound(s) was assessed    Wound Care: was done: changed dressing, Visual inspection    Orders reviewed  Discussed plan of care with  Patient, family and nurse,        Assessment:       Large area of epidermal loss with potential full thickness skin loss at area of necrosis 2/2 extravastion.  Measurements improvewd and slough is loosening well with use of Medihoney.  Pain level has increased only slightly with use of Medihoney   Recommend hand therapy soon to avoid contracture        Plan:     Nursing to notify the Provider(s) and re-consult the [facility] Nurse if wound(s) deteriorate(s).    Plan of care for wound located on left arm: Change entire dressing daily, clean wound and skin around wound with Microklenz.     Aply IN THIS ORDER:    -Apply Cavilon no-sting to periwound skin.    -Apply Morphine gel to wound edges.    -Apply Medihoney nickel thick to the areas with slough.      -Cover with Mepilex border dressings.      [facility] Nurse will return: weekly     Face to face time: 20 minutes

## 2017-07-19 NOTE — PLAN OF CARE
Problem: Goal Outcome Summary  Goal: Goal Outcome Summary  PT/6C     Pt refused PT x2, attempted once in AM and again in PM. Both attempts pt complained of nausea and not wanting to get up for fear of emesis, 2nd attempt pt also light headed. Cx

## 2017-07-19 NOTE — PROGRESS NOTES
SPIRITUAL HEALTH SERVICES  SPIRITUAL ASSESSMENT Progress Note  [facility] ([place]) 6C     REFERRAL SOURCE: Follow up    Attempted visit, patient with medical team and not available.    PLAN: Will follow up, following patient during stay on unit    Caitie Colón   Intern  Pager [phone]

## 2017-07-20 ENCOUNTER — APPOINTMENT (OUTPATIENT)
Dept: OCCUPATIONAL THERAPY | Facility: CLINIC | Age: 57
DRG: 871 | End: 2017-07-20
Attending: INTERNAL MEDICINE
Payer: COMMERCIAL

## 2017-07-20 ENCOUNTER — APPOINTMENT (OUTPATIENT)
Dept: PHYSICAL THERAPY | Facility: CLINIC | Age: 57
DRG: 871 | End: 2017-07-20
Attending: INTERNAL MEDICINE
Payer: COMMERCIAL

## 2017-07-20 LAB
ANION GAP SERPL CALCULATED.3IONS-SCNC: 8 MMOL/L (ref 3–14)
BASOPHILS # BLD AUTO: 0 10E9/L (ref 0–0.2)
BASOPHILS NFR BLD AUTO: 0.7 %
BUN SERPL-MCNC: 28 MG/DL (ref 7–30)
CALCIUM SERPL-MCNC: 8.6 MG/DL (ref 8.5–10.1)
CHLORIDE SERPL-SCNC: 100 MMOL/L (ref 94–109)
CO2 SERPL-SCNC: 27 MMOL/L (ref 20–32)
CREAT SERPL-MCNC: 0.74 MG/DL (ref 0.52–1.04)
DIFFERENTIAL METHOD BLD: ABNORMAL
EOSINOPHIL # BLD AUTO: 0 10E9/L (ref 0–0.7)
EOSINOPHIL NFR BLD AUTO: 0.7 %
ERYTHROCYTE [DISTWIDTH] IN BLOOD BY AUTOMATED COUNT: 27.8 % (ref 10–15)
GFR SERPL CREATININE-BSD FRML MDRD: 81 ML/MIN/1.7M2
GLUCOSE SERPL-MCNC: 92 MG/DL (ref 70–99)
HCT VFR BLD AUTO: 27.6 % (ref 35–47)
HGB BLD-MCNC: 9 G/DL (ref 11.7–15.7)
IMM GRANULOCYTES # BLD: 0 10E9/L (ref 0–0.4)
IMM GRANULOCYTES NFR BLD: 0.3 %
LYMPHOCYTES # BLD AUTO: 0.4 10E9/L (ref 0.8–5.3)
LYMPHOCYTES NFR BLD AUTO: 14.8 %
MCH RBC QN AUTO: 31.3 PG (ref 26.5–33)
MCHC RBC AUTO-ENTMCNC: 32.6 G/DL (ref 31.5–36.5)
MCV RBC AUTO: 96 FL (ref 78–100)
METHYLMALONATE SERPL-SCNC: 0.21
MONOCYTES # BLD AUTO: 0.5 10E9/L (ref 0–1.3)
MONOCYTES NFR BLD AUTO: 17.8 %
NEUTROPHILS # BLD AUTO: 2 10E9/L (ref 1.6–8.3)
NEUTROPHILS NFR BLD AUTO: 65.7 %
NRBC # BLD AUTO: 0 10*3/UL
NRBC BLD AUTO-RTO: 0 /100
PLATELET # BLD AUTO: 31 10E9/L (ref 150–450)
POTASSIUM SERPL-SCNC: 4 MMOL/L (ref 3.4–5.3)
RBC # BLD AUTO: 2.88 10E12/L (ref 3.8–5.2)
SODIUM SERPL-SCNC: 135 MMOL/L (ref 133–144)
WBC # BLD AUTO: 3 10E9/L (ref 4–11)

## 2017-07-20 PROCEDURE — 21400006 ZZH R&B CCU INTERMEDIATE UMMC

## 2017-07-20 PROCEDURE — 36415 COLL VENOUS BLD VENIPUNCTURE: CPT | Performed by: INTERNAL MEDICINE

## 2017-07-20 PROCEDURE — 25000132 ZZH RX MED GY IP 250 OP 250 PS 637: Performed by: NURSE PRACTITIONER

## 2017-07-20 PROCEDURE — 99231 SBSQ HOSP IP/OBS SF/LOW 25: CPT | Mod: GC | Performed by: INTERNAL MEDICINE

## 2017-07-20 PROCEDURE — 85025 COMPLETE CBC W/AUTO DIFF WBC: CPT | Performed by: INTERNAL MEDICINE

## 2017-07-20 PROCEDURE — 40000133 ZZH STATISTIC OT WARD VISIT

## 2017-07-20 PROCEDURE — 25000132 ZZH RX MED GY IP 250 OP 250 PS 637

## 2017-07-20 PROCEDURE — 25000132 ZZH RX MED GY IP 250 OP 250 PS 637: Performed by: STUDENT IN AN ORGANIZED HEALTH CARE EDUCATION/TRAINING PROGRAM

## 2017-07-20 PROCEDURE — 97116 GAIT TRAINING THERAPY: CPT | Mod: GP | Performed by: PHYSICAL THERAPIST

## 2017-07-20 PROCEDURE — 97110 THERAPEUTIC EXERCISES: CPT | Mod: GP | Performed by: PHYSICAL THERAPIST

## 2017-07-20 PROCEDURE — 97530 THERAPEUTIC ACTIVITIES: CPT | Mod: GP | Performed by: PHYSICAL THERAPIST

## 2017-07-20 PROCEDURE — 97535 SELF CARE MNGMENT TRAINING: CPT | Mod: GO | Performed by: OCCUPATIONAL THERAPIST

## 2017-07-20 PROCEDURE — 25000128 H RX IP 250 OP 636: Performed by: INTERNAL MEDICINE

## 2017-07-20 PROCEDURE — 25000125 ZZHC RX 250: Performed by: INTERNAL MEDICINE

## 2017-07-20 PROCEDURE — 80048 BASIC METABOLIC PNL TOTAL CA: CPT | Performed by: INTERNAL MEDICINE

## 2017-07-20 PROCEDURE — 25000132 ZZH RX MED GY IP 250 OP 250 PS 637: Performed by: INTERNAL MEDICINE

## 2017-07-20 PROCEDURE — 99223 1ST HOSP IP/OBS HIGH 75: CPT | Performed by: INTERNAL MEDICINE

## 2017-07-20 PROCEDURE — 40000133 ZZH STATISTIC OT WARD VISIT: Performed by: OCCUPATIONAL THERAPIST

## 2017-07-20 PROCEDURE — 99207 ZZC CDG-CORRECTLY CODED, REVIEWED AND AGREE: CPT | Performed by: INTERNAL MEDICINE

## 2017-07-20 PROCEDURE — 97110 THERAPEUTIC EXERCISES: CPT | Mod: GO

## 2017-07-20 PROCEDURE — 40000193 ZZH STATISTIC PT WARD VISIT: Performed by: PHYSICAL THERAPIST

## 2017-07-20 RX ORDER — FUROSEMIDE 10 MG/ML
40 INJECTION INTRAMUSCULAR; INTRAVENOUS DAILY
Status: DISCONTINUED | OUTPATIENT
Start: 2017-07-20 | End: 2017-07-25

## 2017-07-20 RX ADMIN — MICONAZOLE NITRATE: 2 POWDER TOPICAL at 19:45

## 2017-07-20 RX ADMIN — MIDODRINE HYDROCHLORIDE 10 MG: 5 TABLET ORAL at 18:39

## 2017-07-20 RX ADMIN — ATENOLOL 50 MG: 50 TABLET ORAL at 19:46

## 2017-07-20 RX ADMIN — GABAPENTIN 200 MG: 100 CAPSULE ORAL at 21:59

## 2017-07-20 RX ADMIN — DIGOXIN 125 MCG: 125 TABLET ORAL at 08:51

## 2017-07-20 RX ADMIN — POTASSIUM CHLORIDE 20 MEQ: 750 TABLET, EXTENDED RELEASE ORAL at 08:52

## 2017-07-20 RX ADMIN — MULTIPLE VITAMINS W/ MINERALS TAB 1 TABLET: TAB at 08:51

## 2017-07-20 RX ADMIN — THIAMINE HCL (VITAMIN B1) 50 MG TABLET 50 MG: at 08:51

## 2017-07-20 RX ADMIN — MIDODRINE HYDROCHLORIDE 10 MG: 5 TABLET ORAL at 08:51

## 2017-07-20 RX ADMIN — PREDNISONE 5 MG: 5 TABLET ORAL at 08:52

## 2017-07-20 RX ADMIN — ACETAMINOPHEN 650 MG: 325 TABLET, FILM COATED ORAL at 19:46

## 2017-07-20 RX ADMIN — MICONAZOLE NITRATE: 2 POWDER TOPICAL at 08:52

## 2017-07-20 RX ADMIN — GABAPENTIN 200 MG: 100 CAPSULE ORAL at 08:51

## 2017-07-20 RX ADMIN — FUROSEMIDE 40 MG: 10 INJECTION, SOLUTION INTRAVENOUS at 12:18

## 2017-07-20 RX ADMIN — Medication 25 MG: at 22:22

## 2017-07-20 RX ADMIN — GABAPENTIN 200 MG: 100 CAPSULE ORAL at 14:05

## 2017-07-20 RX ADMIN — PANTOPRAZOLE SODIUM 40 MG: 40 TABLET, DELAYED RELEASE ORAL at 05:41

## 2017-07-20 RX ADMIN — CALCIUM CARBONATE 600 MG (1,500 MG)-VITAMIN D3 400 UNIT TABLET 2 TABLET: at 08:51

## 2017-07-20 RX ADMIN — Medication: at 14:19

## 2017-07-20 RX ADMIN — ATENOLOL 50 MG: 50 TABLET ORAL at 08:51

## 2017-07-20 RX ADMIN — MIDODRINE HYDROCHLORIDE 10 MG: 5 TABLET ORAL at 12:18

## 2017-07-20 RX ADMIN — FOLIC ACID 1 MG: 1 TABLET ORAL at 08:51

## 2017-07-20 NOTE — CONSULTS
Hematology -- Follow up Note    Discussed with family the results of the PET scan concerning for lymphoma. Plan to arrange for biopsy of a liver lesion. Pt and family in agreement with the plan.    Fox Salinas MD  Hematology Oncology fellow  [phone]      HEMATOLOGY STAFF:  Patient not seen.  Case (including imaging studies) reviewed with fellow, whose note reflects our joint assessment and plan.  PET concerning for malignant process (lymphoma vs. other) -- need tissue to establish diagnosis.  Liver lesions appear most amenable to biopsy.    Christos Quiles MD  Associate Professor of Medicine  Division of Hematology, Oncology, and Transplantation  Director, [facility]

## 2017-07-20 NOTE — PLAN OF CARE
Problem: Goal Outcome Summary  Goal: Goal Outcome Summary  1. Pt will have fevers well controlled  2. Pt will be hemodynamically stable  Outcome: No Change  VSS, A&Ox4, Afib w/occasional bigeminal PVCs w/rates 60s-70s. PET scan results +lymphoma w/liver, mediastinal & abdominal involvement, discussed w/pt and spouse by Heme/Onc, Cards 1, SW and RN. Plan for liver biopsy to further narrow down treatment options, pt and spouse in agreement w/plan, gave support and plan for further consults from health psych and palliative. Pt teary, states she's eager to have liver biopsy. Pt more lethargic today, napping on/off. K+ 3.8, replaced. 40mg IV push lasix x1, additional 40mEq K+ given. Mg 2.0, replaced. +1-3 generalized edema, compression stockings in place. L arm extravasation dressing changed by WOC today, improving. Incontinent of urine d/t saddle anesthesia, checked Q2hr. +BM x1. Fair-good appetite, up w/asst x1. Plan for liver biopsy, continue to provide support. Continue to monitor and notify Cards 1 of changes or concerns.      Dm Bloom, RN  Hours cared for: [phone]

## 2017-07-20 NOTE — PROGRESS NOTES
"SPIRITUAL HEALTH SERVICES  SPIRITUAL ASSESSMENT Progress Note  [facility] ([place]) 6C     PRIMARY FOCUS:     Emotional/spiritual/Shinto distress    Support for coping    REFERRAL SOURCE: Follow up    ILLNESS CIRCUMSTANCES:   Reviewed documentation. Reflective conversation shared with Amelia and Wolf which integrated elements of illness and family narratives.     Context of Serious Illness/Symptom(s) - Patient and  shared that yesterday she was diagnosed with lymphoma, and now they're waiting for a biopsy and plan from the doctors.    Resources for Support - Patient and  are primary support for each other.    DISTRESS:     Emotional/Spiritual/Existential Distress - Patient expressed distress at new diagnosis and sense that more and more is getting piled on her.    Spiritism Distress - Patient said \"I'm not sure why I'm still here\" in regards to still being alive and having so many things go wrong, coming in for cardiac arrest and now having cancer.    Social/Cultural/Economic Distress - Patient expressed fear of being dependent on others when she goes home and desire to be able to help care for herself.    SPIRITUAL/Orthodoxy COPING:     Mu-ism/Jennifer - Patient and  said they do thank God that the cardiac arrest didn't happen one day later, because the patient would have been home alone and .    Spiritual Practice(s) - Patient and  talked about praying that things go well, and for gratitude that the patient is still alive. Patient said that doing OT and PT are helpful for her coping, because hospital life can be boring and because it helps with the goal of being able to do some things herself when she goes home.    Emotional/Relational/Existential Connections - Patient and  express being a big support for each other, particularly 's ability to advocate for his wife's well-being and care. They hold hands during our visits and were tearful when I noted that to them as " "being a blessing.    GOALS OF CARE:    Goals of Care - Patient expressed hope that the liver biopsy will give the doctors the information they need and that they will have a plan of care by next week. Patient expressed desire for the path to be clear and fast.    Meaning/Sense-Making - Patient expressed feelings of ambiguity and being in-between, because they now know why she's been so sick, but not they're waiting to know how bad it is and how it will be treated.    PLAN: Patient and  requested that I follow them as they move to 7D - \"we don't want to have to reinvent the wheel with someone. It's been complicated enough.\" I let them know that I would follow them and negotiate it with their new unit . I also reminded them that they can have the nurse request a visit from [facility] at any time, if they need a visit more urgently than our standing 1-2x/week.    Caitie Colón   Intern  Pager [phone]  "

## 2017-07-20 NOTE — PROGRESS NOTES
D: Lymphoma, hx out of hospital arrest    I: Monitored vitals and assessed pt status.   Changed: Left arm extravasation wound  Running:-  PRN:-    A: A0x4. VSS. Afebrile. A-Fib. Liver CT tomorrow, platelets ordered. Patient up with therapy and in good spirits. Spouse present and supportive.     P: Continue to monitor Pt status and report changes to treatment team.

## 2017-07-20 NOTE — PLAN OF CARE
"Problem: Goal Outcome Summary  Goal: Goal Outcome Summary  1. Pt will have fevers well controlled  2. Pt will be hemodynamically stable   /76 (BP Location: Right arm)  Pulse 62  Temp 98.7  F (37.1  C) (Oral)  Resp 18  Ht 1.48 m (4' 10.25\")  Wt 72.5 kg (159 lb 12.8 oz)  SpO2 93%  BMI 33.11 kg/m2  Adm: 6/19 for fever and chills  Neuro: AO x 4  Cardiac: VSS, rate controlled A-Fib  Respiratory: RA, lung sounds clear  GI/: Incontinent of urine d/t saddle anesthesia, incontinence care provided q 2-3 hours; -BM during shift; no c/o of nausea  Diet/appetite: 2 g Na diet with fair appetite  Activity: A x 1 with repositioning and walker for transfers  Pain: denied  Skin: intact, no new deficits noted; LUE extravasation dressing CDI; 2-3+ generalized edema  Lines: R PIV SL'ed  Drains: N/A  Plan: Continue to monitor and replace lytes, consult with physicians in regards to recently diagnosed lymphoma, incontinence care to prevent skin breakdown     Will continue to monitor and notify Cards 1 of any pertinent changes.          "

## 2017-07-20 NOTE — PROGRESS NOTES
Cardiology Daily Note   Date of Service: 7/20/2017  Patient: Amelia Michel  MRN: [id number]  Admission Date: 6/19/2017  Hospital Day # 31    Assessment & Plan:   Amelia Michel is a 56 year old female with PMHx of VSD s/p repair (1969), RA, recently diagnosed atrial fibrillation that was complicated with an OOH cardiac arrest (felt 2/2 long pause with degeneration to VFib while converting Afib to SR with multiple AV prieto agents) who was readmitted from ARU on 6/19 with fever and concern for worsening LUE infection. She has had a complicated medical course, and is now on the  cardiology service as primary since 6/26 for further workup and evaluation of recurrent lactic acidosis, persistent afib and softer blood pressures with tachypnea. Now with concern for malignancy; likely Bcell lymphoma.    Today's Plans  -Diurese: 40mg IV lasix given on 7/120  -Plan for IR CT-Guided biopsy on 7/21  -NPO at midnight; plan for 2 units platelets in AM    # Fever of unknown origin, last fever 7/12/17  # Concern for Malignancy, likely B-cell Lymphoma  Previously attributed to LUE wound, though wound has continued to improve without surrounding appearance of cellulitis. Discontinued antibiotics with close monitoring. Summary of work-up -- no DVT on ultrasound 7/5/17, MRI of complete spine without nidus of infection 7/6/17, BRE with vegetations raised possibility of endocarditis, but only 1 major/2 minor Duke's criteria, does not have definitively. Serologies for coxiella and bartonella are in process. CT chest 7/13/17 with possible PNA and elevated procalcitonin, but without symptoms or hemodynamic instability. Bone marrow biopsy is concerning for B-cell lymphoma (see Hem/Onc note for full detail).  - Patient neutropenic, not to degree to warrant antibiotics for fever at present  - PET-Scan read back, findings consistent with extensive lymphoma  - Heme/onc following, appreciate recs   - Peripheral flow cytometry with  polytypic Bcells   - RBC folate normal   - serum MMA, homocysteine pending  - Plan for IR CT-Guided biopsy on 7/21  - NPO at midnight; plan for 2 units platelets in AM  - ID consultation, appreciate recommendations  - Thiamine     # Volume overload/anasarca:  # HFrEF (EF 50-55%):  # Hypotension:  Patient initially up 35# at admission; diuresis challenging, secondary to third spacing of fluid (albumin 1.9), but responded with addition of midodrine and ongoing albumin resuscitation. Right heart cath 6/26 with mildly elevated right and left sided filling presures  - Scheduled 40mg IV lasix QD; monitor BMP  - Continue midodrine to 10 mg TID     # CELSA, improving  Likely prerenal with combination of diuresis and NSAID use for FUO. Also consider contrast injury given CT chest.   - Avoid nephrotoxins    # Atrial fibrillation/flutter with RVR:  RVR in setting of volume overload above. Patient rec'd single dose of diltiazem with associated soft BPs.   - Continue dig 125mcg  - Atenolol 50 mg BID    - Not anticoagulated due to thrombocytopenia    # Out of hospital arrest:  Pt will need to wear LifeVest until medically appropriate and cleared from infection/wound standpoint for ICD implantation likely 4-6 weeks after discharge per EP (pending hospital course for LUE wound).       # Acute on Chronic Thrombocytopenia:   Plts noted to decrease from 104 to as low as 15 during recent hospitalization requiring transfusion w/ chronically low in the 's as outpatient. Possible bactrim was offending agent for exacerbation, and discontinued this medication (exchanged for meropenem in the setting of worsening cellulitis).   - If this is chronic ITP, no need for treatment unless plts drift < 10s if not bleeding  - Continue to hold RA meds for now  - Holding apixiban (last dose 7/8/17)    # Acute on chronic anemia: Stable  No evidence of overt bleeding. Hgb 6.6 on 7/16/17 likely dilutional with all lines down and ongoing resuscitation  with albumin, NS.   - Daily CBC  - 1 U RBCs 7/16/17    # RA:   History of seropositive rheumatoid arthritis (anti-CCP positive) since late 1980;s w/ multiple MTP osteotomies, partial right wrist fusion, longstanding therapy consisting of MTX, prednisone and HCQ  - Tapered to 5 mg prednisone on 7/17 with continued monitoring for flaring  - Rheumatology following  - Continue to hold HCQ and MTX until outpatient follow-up  - Follow-up with [facility] clinic Dr. Conor Cunha in 4-6 weeks after discharge      # Severe Critical Illness Myopathy:   Significant deconditioning w/ median neuropathies most likely localized to the wrists and ulnar neuropathies most likely localized to the elbows secondary to RA.  - Ongoing extensive PT/OT/SLP    Consulting Services: Nutrition, heme/onc, ID, rheumatology    CODE: Full Code  DVT Ppx: being held due to thrombocytopenia  Diet/Fluids: As above.  Disposition: Pending improvement in above.    Pt's care was discussed with bedside RN and patient during Care Team Rounds.    Patient was discussed and evaluated with Dr. Mcguire.    Mike Alicia MD  Internal Medicine Resident, PGY2  [phone]    CARDIOLOGY STAFF  Patient seen and examined by me.  History and physical examination discussed with Dr. Alicia whose note reflects our joint assessment and recommendation/plans.  We continue slow diuresis of third spaced fluid.  Her weight is down 10# from admission and about 38# from her maximum.  We anticipate transfer to Hematology, in the meantime she is being prepared for liver biopsy to [place].  Richy Mcguire        ___________________________________________________________________    Subjective & Interval History:   -nursing notes reviewed  -chart reviewed  -No major complaints this AM  -no fevers, chills, SOB more than normal    Medications: Reviewed in EPIC. List below for reference    Physical Exam:    Blood pressure 124/64, pulse 62, temperature 98.4  F (36.9  C),  "temperature source Oral, resp. rate 18, height 1.48 m (4' 10.25\"), weight 71.6 kg (157 lb 12.8 oz), SpO2 97 %, not currently breastfeeding.    General: Sitting in her chair. No acute distress.  at bedside  Resp: Diminished breath sounds posteriorly; clear anteriorly  CV: Regular rate and rhythm. Crisp S1 and S2. No murmurs, rubs nor gallops   Abdominal: Soft, nondistended. Nontender to palpation. No peritoneal signs. BS+  t and bilaterally equal. 2+ LE edema, 2+ sacral edema as well  Neurological: CN's II-XII grossly intact. Alert and oriented completely.    Labs & Studies of Note: Reviewed in Epic    PET-Scan  IMPRESSION: In this patient with history of abnormal bone marrow  biopsy and clinical suspicion of lymphoma;      1. Findings suggesting extensive lymphoma including;    a. Multiple hypermetabolic mediastinal and single left neck level 2A  lymphadenopathy.    b. Extensive FDG avid bony lesions throughout the axial and  appendicular skeleton. No bone fracture or spinal canal involvement.     c. Numerous hepatic FDG avid lesions.    d. Findings suspicious for cardiac involvement as evidenced by  thickening of the interatrial septum with associated increased FDG  uptake. This can be further evaluated with echocardiogram.    e. FDG avid lesion in the pelvis contiguous with the uterus,  suspicious for uterine involvement.  2. Stable bilateral pleural effusions with associated compression  atelectasis.  3. Mild to moderate ascites. Diffuse soft tissue anasarca.  4. Splenomegaly without associated FDG uptake.    CMP    Recent Labs  Lab 07/20/17  0741 07/19/17  0721 07/18/17  0753 07/17/17  0706  07/16/17  0734 07/15/17  0803 07/14/17  0746    136 138 139  --  138 136 136   POTASSIUM 4.0 3.8 3.6 4.0  < > 4.1 4.4 4.3   CHLORIDE 100 101 103 104  --  104 105 101   CO2 27 26 26 27  --  26 24 26   ANIONGAP 8 10 8 8  --  8 8 9   GLC 92 98 90 80  --  85 100* 92   BUN 28 30 30 32*  --  37* 42* 40*   CR 0.74 0.71 " 0.67 0.70  --  0.85 0.99 1.09*   GFRESTIMATED 81 85 >90Non  GFR Calc 86  --  69 58* 52*   GFRESTBLACK >90African American GFR Calc >90African American GFR Calc >90African American GFR Calc >90African American GFR Calc  --  84 70 63   VIKTORIYA 8.6 8.7 8.9 9.1  --  9.1 8.5 8.6   MAG  --  2.0 1.8  --   --   --   --   --    PROTTOTAL  --   --   --   --   --  5.9* 5.3*  --    ALBUMIN  --   --   --   --   --  3.8 2.5* 2.5*   BILITOTAL  --   --   --   --   --  1.7* 0.9  --    ALKPHOS  --   --   --   --   --  127 116  --    AST  --   --   --   --   --  56* 41  --    ALT  --   --   --   --   --  37 34  --    < > = values in this interval not displayed.    CBC    Recent Labs  Lab 07/20/17  0741 07/19/17  0721 07/18/17  1944 07/18/17  0753 07/17/17  0706   WBC 3.0* 2.6*  --  2.0* 2.2*   RBC 2.88* 2.74*  --  2.70* 2.94*   HGB 9.0* 8.6*  --  8.4* 8.8*   HCT 27.6* 26.2* 27.5* 25.7* 27.9*   PLT 31* 29*  --  31* 34*     INR    Recent Labs  Lab 07/16/17  0734   INR 1.41*     Medication List for Reference (delete if desired)  Current Facility-Administered Medications   Medication     furosemide (LASIX) injection 40 mg     sodium chloride (PF) 0.9% PF flush 1-74 mL     acetaminophen (TYLENOL) tablet 650 mg     predniSONE (DELTASONE) tablet 5 mg     thiamine tablet 50 mg     folic acid (FOLVITE) tablet 1 mg     atenolol (TENORMIN) tablet 50 mg     midodrine (PROAMATINE) tablet 10 mg     miconazole (MICATIN; MICRO GUARD) 2 % powder     calcium carbonate (TUMS) chewable tablet 500 mg     pantoprazole (PROTONIX) EC tablet 40 mg     digoxin (LANOXIN) tablet 125 mcg     ondansetron (ZOFRAN) tablet 4 mg     prochlorperazine (COMPAZINE) injection 5-10 mg     ondansetron (ZOFRAN) injection 4 mg     simethicone (MYLICON) chewable tablet 80 mg     sodium chloride (PF) 0.9% PF flush 10-20 mL     sodium chloride (PF) 0.9% PF flush 10 mL     heparin lock flush 10 UNIT/ML injection 5-10 mL     dextrose 10 % 1,000 mL infusion      gabapentin (NEURONTIN) capsule 200 mg     melatonin tablet 3 mg     morphine 0.1% in solosite topical gel 2 g     multivitamin, therapeutic with minerals (THERA-VIT-M) tablet 1 tablet     lidocaine 1 % 1 mL     lidocaine (LMX4) kit     sodium chloride (PF) 0.9% PF flush 3 mL     medication instruction     acetaminophen (TYLENOL) Suppository 650 mg     Patient is already receiving anticoagulation with heparin, enoxaparin (LOVENOX), warfarin (COUMADIN)  or other anticoagulant medication     glucose 40 % gel 15-30 g    Or     dextrose 50 % injection 25-50 mL    Or     glucagon injection 1 mg     naloxone (NARCAN) injection 0.1-0.4 mg     calcium-vitamin D (CALTRATE) 600-400 MG-UNIT per tablet 2 tablet     morphine 0.1% in solosite topical gel     potassium chloride SA (K-DUR/KLOR-CON M) CR tablet 20-40 mEq     potassium chloride (KLOR-CON) Packet 20-40 mEq     potassium chloride 10 mEq in 100 mL intermittent infusion     potassium chloride 10 mEq in 100 mL intermittent infusion with 10 mg lidocaine     potassium chloride 20 mEq in 50 mL intermittent infusion     magnesium sulfate 2 g in NS intermittent infusion (PharMEDium or FV Cmpd)     magnesium sulfate 4 g in 100 mL sterile water (premade)     sodium phosphate 10 mmol in D5W intermittent infusion     sodium phosphate 15 mmol in D5W intermittent infusion     sodium phosphate 20 mmol in D5W intermittent infusion     sodium phosphate 25 mmol in D5W intermittent infusion     melatonin tablet 1 mg     traMADol (ULTRAM) half-tab 25 mg

## 2017-07-20 NOTE — PLAN OF CARE
Problem: Goal Outcome Summary  Goal: Goal Outcome Summary  1. Pt will have fevers well controlled  2. Pt will be hemodynamically stable   PT 6C: Pt ambulates 60ft x 2 with FWW, CGA, and wheelchair follow; completes 10 minutes on NuStep at level 1 resistance. Pt limited by fatigue and SOB, SpO2 >90% on room air with activity.  Recommend discharge to ARU when medically appropriate to improve strength, activity tolerance, and independence with functional mobility.

## 2017-07-20 NOTE — PLAN OF CARE
Problem: Goal Outcome Summary  Goal: Goal Outcome Summary  1. Pt will have fevers well controlled  2. Pt will be hemodynamically stable   OT: Pt completed 15 reps of 9 UE ROM exercises with vc s for PLB. Pt tolerated activity very well and reported no pain, only muscle fatigue. VSS.   REC: Discharge to ARU to continue to increase independence in ADLs and activity tolerance.

## 2017-07-21 ENCOUNTER — APPOINTMENT (OUTPATIENT)
Dept: CT IMAGING | Facility: CLINIC | Age: 57
DRG: 871 | End: 2017-07-21
Attending: NURSE PRACTITIONER
Payer: COMMERCIAL

## 2017-07-21 LAB
ANION GAP SERPL CALCULATED.3IONS-SCNC: 7 MMOL/L (ref 3–14)
BACTERIA SPEC CULT: NO GROWTH
BACTERIA SPEC CULT: NO GROWTH
BASOPHILS # BLD AUTO: 0 10E9/L (ref 0–0.2)
BASOPHILS NFR BLD AUTO: 0.9 %
BLD PROD TYP BPU: NORMAL
BLD UNIT ID BPU: 0
BLD UNIT ID BPU: 0
BLOOD PRODUCT CODE: NORMAL
BLOOD PRODUCT CODE: NORMAL
BPU ID: NORMAL
BPU ID: NORMAL
BUN SERPL-MCNC: 27 MG/DL (ref 7–30)
CALCIUM SERPL-MCNC: 9 MG/DL (ref 8.5–10.1)
CHLORIDE SERPL-SCNC: 96 MMOL/L (ref 94–109)
CO2 SERPL-SCNC: 29 MMOL/L (ref 20–32)
CREAT SERPL-MCNC: 0.72 MG/DL (ref 0.52–1.04)
DIFFERENTIAL METHOD BLD: ABNORMAL
EOSINOPHIL # BLD AUTO: 0 10E9/L (ref 0–0.7)
EOSINOPHIL NFR BLD AUTO: 0.3 %
ERYTHROCYTE [DISTWIDTH] IN BLOOD BY AUTOMATED COUNT: 27.7 % (ref 10–15)
GFR SERPL CREATININE-BSD FRML MDRD: 84 ML/MIN/1.7M2
GLUCOSE SERPL-MCNC: 84 MG/DL (ref 70–99)
HCT VFR BLD AUTO: 29.9 % (ref 35–47)
HGB BLD-MCNC: 9.6 G/DL (ref 11.7–15.7)
IMM GRANULOCYTES # BLD: 0 10E9/L (ref 0–0.4)
IMM GRANULOCYTES NFR BLD: 0.3 %
INR PPP: 1.41 (ref 0.86–1.14)
LYMPHOCYTES # BLD AUTO: 0.4 10E9/L (ref 0.8–5.3)
LYMPHOCYTES NFR BLD AUTO: 13.7 %
MCH RBC QN AUTO: 31.1 PG (ref 26.5–33)
MCHC RBC AUTO-ENTMCNC: 32.1 G/DL (ref 31.5–36.5)
MCV RBC AUTO: 97 FL (ref 78–100)
MICRO REPORT STATUS: NORMAL
MICRO REPORT STATUS: NORMAL
MONOCYTES # BLD AUTO: 0.6 10E9/L (ref 0–1.3)
MONOCYTES NFR BLD AUTO: 17.7 %
NEUTROPHILS # BLD AUTO: 2.2 10E9/L (ref 1.6–8.3)
NEUTROPHILS NFR BLD AUTO: 67.1 %
NRBC # BLD AUTO: 0 10*3/UL
NRBC BLD AUTO-RTO: 0 /100
NUM BPU REQUESTED: 1
NUM BPU REQUESTED: 2
PLATELET # BLD AUTO: 53 10E9/L (ref 150–450)
POTASSIUM SERPL-SCNC: 3.5 MMOL/L (ref 3.4–5.3)
RBC # BLD AUTO: 3.09 10E12/L (ref 3.8–5.2)
SODIUM SERPL-SCNC: 132 MMOL/L (ref 133–144)
SPECIMEN SOURCE: NORMAL
SPECIMEN SOURCE: NORMAL
TRANSFUSION STATUS PATIENT QL: NORMAL
WBC # BLD AUTO: 3.2 10E9/L (ref 4–11)

## 2017-07-21 PROCEDURE — 27210909 ZZH NEEDLE CR5

## 2017-07-21 PROCEDURE — 88275 CYTOGENETICS 100-300: CPT

## 2017-07-21 PROCEDURE — P9037 PLATE PHERES LEUKOREDU IRRAD: HCPCS | Performed by: INTERNAL MEDICINE

## 2017-07-21 PROCEDURE — 0FB03ZX EXCISION OF LIVER, PERCUTANEOUS APPROACH, DIAGNOSTIC: ICD-10-PCS | Performed by: RADIOLOGY

## 2017-07-21 PROCEDURE — 25000132 ZZH RX MED GY IP 250 OP 250 PS 637: Performed by: INTERNAL MEDICINE

## 2017-07-21 PROCEDURE — 00000159 ZZHCL STATISTIC H-SEND OUTS PREP: Performed by: INTERNAL MEDICINE

## 2017-07-21 PROCEDURE — 80048 BASIC METABOLIC PNL TOTAL CA: CPT | Performed by: INTERNAL MEDICINE

## 2017-07-21 PROCEDURE — 85025 COMPLETE CBC W/AUTO DIFF WBC: CPT | Performed by: INTERNAL MEDICINE

## 2017-07-21 PROCEDURE — 88185 FLOWCYTOMETRY/TC ADD-ON: CPT | Performed by: FAMILY MEDICINE

## 2017-07-21 PROCEDURE — 85610 PROTHROMBIN TIME: CPT | Performed by: INTERNAL MEDICINE

## 2017-07-21 PROCEDURE — 93010 ELECTROCARDIOGRAM REPORT: CPT | Performed by: INTERNAL MEDICINE

## 2017-07-21 PROCEDURE — 25000132 ZZH RX MED GY IP 250 OP 250 PS 637: Performed by: NURSE PRACTITIONER

## 2017-07-21 PROCEDURE — 21400006 ZZH R&B CCU INTERMEDIATE UMMC

## 2017-07-21 PROCEDURE — 25000132 ZZH RX MED GY IP 250 OP 250 PS 637: Performed by: STUDENT IN AN ORGANIZED HEALTH CARE EDUCATION/TRAINING PROGRAM

## 2017-07-21 PROCEDURE — 88341 IMHCHEM/IMCYTCHM EA ADD ANTB: CPT | Performed by: INTERNAL MEDICINE

## 2017-07-21 PROCEDURE — 88365 INSITU HYBRIDIZATION (FISH): CPT | Performed by: INTERNAL MEDICINE

## 2017-07-21 PROCEDURE — 88271 CYTOGENETICS DNA PROBE: CPT

## 2017-07-21 PROCEDURE — 25000132 ZZH RX MED GY IP 250 OP 250 PS 637

## 2017-07-21 PROCEDURE — 88333 PATH CONSLTJ SURG CYTO XM 1: CPT | Performed by: INTERNAL MEDICINE

## 2017-07-21 PROCEDURE — 36415 COLL VENOUS BLD VENIPUNCTURE: CPT | Performed by: INTERNAL MEDICINE

## 2017-07-21 PROCEDURE — C1769 GUIDE WIRE: HCPCS

## 2017-07-21 PROCEDURE — 88342 IMHCHEM/IMCYTCHM 1ST ANTB: CPT | Performed by: INTERNAL MEDICINE

## 2017-07-21 PROCEDURE — 25000125 ZZHC RX 250: Performed by: INTERNAL MEDICINE

## 2017-07-21 PROCEDURE — 88307 TISSUE EXAM BY PATHOLOGIST: CPT | Performed by: INTERNAL MEDICINE

## 2017-07-21 PROCEDURE — 99152 MOD SED SAME PHYS/QHP 5/>YRS: CPT

## 2017-07-21 PROCEDURE — 93005 ELECTROCARDIOGRAM TRACING: CPT

## 2017-07-21 PROCEDURE — 88184 FLOWCYTOMETRY/ TC 1 MARKER: CPT | Performed by: FAMILY MEDICINE

## 2017-07-21 PROCEDURE — 99231 SBSQ HOSP IP/OBS SF/LOW 25: CPT | Mod: GC | Performed by: INTERNAL MEDICINE

## 2017-07-21 PROCEDURE — 40001004 ZZHCL STATISTIC FLOW INT 9-15 ABY TC 88188: Performed by: FAMILY MEDICINE

## 2017-07-21 PROCEDURE — 99153 MOD SED SAME PHYS/QHP EA: CPT

## 2017-07-21 PROCEDURE — 25000128 H RX IP 250 OP 636: Performed by: INTERNAL MEDICINE

## 2017-07-21 PROCEDURE — 77012 CT SCAN FOR NEEDLE BIOPSY: CPT

## 2017-07-21 RX ORDER — FENTANYL CITRATE 50 UG/ML
25-50 INJECTION, SOLUTION INTRAMUSCULAR; INTRAVENOUS EVERY 5 MIN PRN
Status: DISCONTINUED | OUTPATIENT
Start: 2017-07-21 | End: 2017-07-21 | Stop reason: HOSPADM

## 2017-07-21 RX ORDER — FLUMAZENIL 0.1 MG/ML
0.2 INJECTION, SOLUTION INTRAVENOUS
Status: DISCONTINUED | OUTPATIENT
Start: 2017-07-21 | End: 2017-07-21 | Stop reason: HOSPADM

## 2017-07-21 RX ORDER — NALOXONE HYDROCHLORIDE 0.4 MG/ML
.1-.4 INJECTION, SOLUTION INTRAMUSCULAR; INTRAVENOUS; SUBCUTANEOUS
Status: DISCONTINUED | OUTPATIENT
Start: 2017-07-21 | End: 2017-07-21 | Stop reason: HOSPADM

## 2017-07-21 RX ADMIN — MULTIPLE VITAMINS W/ MINERALS TAB 1 TABLET: TAB at 19:21

## 2017-07-21 RX ADMIN — MICONAZOLE NITRATE: 2 POWDER TOPICAL at 12:23

## 2017-07-21 RX ADMIN — Medication: at 21:01

## 2017-07-21 RX ADMIN — PREDNISONE 5 MG: 5 TABLET ORAL at 08:29

## 2017-07-21 RX ADMIN — GABAPENTIN 200 MG: 100 CAPSULE ORAL at 13:06

## 2017-07-21 RX ADMIN — MIDODRINE HYDROCHLORIDE 10 MG: 5 TABLET ORAL at 13:06

## 2017-07-21 RX ADMIN — GABAPENTIN 200 MG: 100 CAPSULE ORAL at 08:26

## 2017-07-21 RX ADMIN — GABAPENTIN 200 MG: 100 CAPSULE ORAL at 21:00

## 2017-07-21 RX ADMIN — MIDODRINE HYDROCHLORIDE 10 MG: 5 TABLET ORAL at 08:26

## 2017-07-21 RX ADMIN — ACETAMINOPHEN 650 MG: 325 TABLET, FILM COATED ORAL at 11:01

## 2017-07-21 RX ADMIN — ATENOLOL 50 MG: 50 TABLET ORAL at 11:01

## 2017-07-21 RX ADMIN — FOLIC ACID 1 MG: 1 TABLET ORAL at 19:21

## 2017-07-21 RX ADMIN — ATENOLOL 50 MG: 50 TABLET ORAL at 20:58

## 2017-07-21 RX ADMIN — FUROSEMIDE 40 MG: 10 INJECTION, SOLUTION INTRAVENOUS at 08:29

## 2017-07-21 RX ADMIN — CALCIUM CARBONATE 600 MG (1,500 MG)-VITAMIN D3 400 UNIT TABLET 2 TABLET: at 19:21

## 2017-07-21 RX ADMIN — POTASSIUM CHLORIDE 20 MEQ: 750 TABLET, EXTENDED RELEASE ORAL at 12:23

## 2017-07-21 RX ADMIN — Medication 2 G: at 20:00

## 2017-07-21 RX ADMIN — MIDODRINE HYDROCHLORIDE 10 MG: 5 TABLET ORAL at 19:22

## 2017-07-21 RX ADMIN — THIAMINE HCL (VITAMIN B1) 50 MG TABLET 50 MG: at 19:21

## 2017-07-21 RX ADMIN — MICONAZOLE NITRATE: 2 POWDER TOPICAL at 21:01

## 2017-07-21 RX ADMIN — Medication: at 21:03

## 2017-07-21 RX ADMIN — PANTOPRAZOLE SODIUM 40 MG: 40 TABLET, DELAYED RELEASE ORAL at 05:38

## 2017-07-21 RX ADMIN — DIGOXIN 125 MCG: 125 TABLET ORAL at 08:26

## 2017-07-21 ASSESSMENT — PAIN DESCRIPTION - DESCRIPTORS: DESCRIPTORS: HEADACHE

## 2017-07-21 NOTE — PROGRESS NOTES
Social Work Services Progress Note * 7/19/17    Hospital Day: 33  Date of Initial Social Work Evaluation:  6/20/17  Collaborated with:  Pt and Spouse    Data:  Pt is a 56 year old female who was transferred from [facility].  Plan today to meet with Cardiology, HemeOnc team and bedside RN for care conference.    Intervention:  Care conference this afternoon with pt, spouse, cards 1, heme/onc team and bedside RN.  Heme/Onc Fellow reviewed results of the PET scan with pt and spouse (Romeo).  At this time pt is open to consults from Health Psychology and or Palliative care due to new adjustment to illness needs.  Pt during meeting was appropriately coping with the new diagnosis of lymphoma.  Spouse Romeo also coping appropriately to new diagnosis.  Plan from Heme/Onc is to do a liver biopsy of tissue to determine pt's chemotherapy treatment options.  Will continue to follow for any additional support needs.    Assessment:  Pt and spouse Romeo coping appropriately after care conference.  SW will be available for any support needed.  Recommend Health Psychology for adjustment to illness counseling.    Plan:    Anticipated Disposition:  Facility:  Return to [facility] as able.    Barriers to d/c plan:  Medical stability, results of liver biopsy and chemotherapy treatment regimen.    Follow Up:  SW follow for support of pt and spouse.    DENNIS Larsen, SETHW  6C Unit   Phone: [phone]  Pager: [phone]  Unit: [phone]      ___________________________________________________________________________________________________________________________________________________    Referrals in process:   [facility] - return when medically stable.    Consider TCU pending pt's chemotherapy needs.    Referrals Discontinued:  None    Community Case Management/Community Services in place:   None

## 2017-07-21 NOTE — PROGRESS NOTES
Cardiology Progress Note    Overnight/subj:   No event s  plts today for liver bx    VS reviewed: Notable for temp: Tm 99.6, HR 60-65, -120/70s, oxygenating on 93% on RA     Wt: on admit 76, yday 71.5, today 71    Cardiac Meds: Atenolol 50mg BIDm digoub 0.125mg daily, folic acid, lasix 40mg iv daily, midodrine 10mg tid, ppi     Radiology Imaging  No new images  Cardiology Studies:  ECG sinus bradycardia, with low voltage    Labs: Reviewed in epic  Cr stable   Pan cytopenia    Phys exam:  GEN: NAD  Pulm: ctab  Cardiac: rrr,   Vascular: +2 HERNANDO and +2 pulses  GI: soft, non distended    ASSESSMENT/PLAN:  55yo F with hx VSD s/p repair, RA, pAF/flutter/SVT c/b OOH arrest with ongoing anasarca and FUO most likely 2/2 to diffuse B cell lymphoma base PET and marrow iopsy.     Contineu diuresis  Liver bx to confirm malignancy  plts prior to bx  HD monitoring post Bx  Dispo; likely tx to heme/oc pending bx     Pt seen and discuss with Dr. Mcguire.     Rick Nguyễn MD  Cardiology Fellow   *[phone]    CARDIOLOGY STAFF  Patient seen and examined by me.  History and physical examination discussed with Dr. Nguyễn whose note reflects our joint assessment and recommendation/plans.  Ms. Michel was receiving platelets in anticipation of liver biopsy today.  I discussed issues of heart rate during sinus rhythm and atrial fibrillation.  Her resting HR is 60s when awake and 50s when sleeping.  This is appropriate and I explained we would not change dose at this time, but would change as needed if and when she has AF recurrence.  He asked that we obtain a baseline ECG today, showing NSR 66 with a PAC, low voltage and diffuse T-wave abnormalities.  Hematology service informed me that they would not take patient in transfer until a biopsy confirms malignancy.  They suggested transfer to a general medicine service but patient's  has made previous comments expressing concerns about transfers between services.  Richy  Maynor

## 2017-07-21 NOTE — PROGRESS NOTES
2 units of platelets administered w/o reaction. Plt level 53 after 1st unit. Remains NPO. Plan for liver bx in IR today. No Resp or Cardiac events. RA. SR with occasional PAC's. Potassium replaced per protocol. Refused therapies but activity levels are improving. LUE extravasation drsg to be changed next shift. Continue to monitor.

## 2017-07-21 NOTE — PROGRESS NOTES
[facility] SERVICE: COURTESY NOTE   Amelia Michel : 1960 Sex: female:   Medical record number [id number] Attending Physician: No att. providers found  Date of Service: 2017    DISCUSSION:   1. Fever of unknown origin. Infectious workup including blood cultures (from 2017 to the most recent on 2017) have shown no growth to date. Enteric bacteria and virus panel by FABIOLA stool (), parasite stain for Anaplasma and Babesia (), A. phagocytophil IgG (), M. Tuberculosis quantiferon (), Lyme IgG (), catheter tip cultures (), c.diff (7/3) have all been negative. UA (7/10) was negative. Bartonella and Q-fever serology negative. At this point, an infectious source of fevers seems unlikely. Pathological and radiographic concern for lymphoma is likely cause of intermittent fever. No antibiotics since 2017 without clinic consequence.   2. Possible pneumonia while recently on meropenem (). Her most recent CT chest () did reveal a right lung tree-in-bud opacities with more consolidative opacities in RLL concerning for pneumonia. However, she has remained largely afebrile since  and does not have any complaints of sore throat, cough, chest pain, shortness of breath or a positive lung exam that would indicate that she currently has a pneumonia.   3. Vegetations on BRE. This meets one of the major Duke criteria for endocarditis. IE antibiotic treatment would be indicated in patients with 2 major criteria, 1 minor criteria + 3 minor criteria or 5 minor criteria. Currently she meets 1 major (vegetation on BRE) and possibly 1-2 minor criteria (fever and splenic infarct), although these last two may be related to her potential lymphoma. Abnormalities on BRE may also be scarring and or RA-related. Hence, there is no indication for antibiotics at this time.        ID will sign off. Call with any future questions.     MEME Chow M.D.  [facility] Service  Staff  [phone]

## 2017-07-21 NOTE — PROGRESS NOTES
Interventional Radiology Pre-Procedure Sedation Assessment   Time of Assessment: 3:22 PM    Expected Level: Moderate Sedation    Indication: Sedation is required for the following type of Procedure: Biopsy    Sedation and procedural consent: Risks, benefits and alternatives were discussed with Patient    PO Intake: Appropriately NPO for procedure    ASA Class: Class 2 - MILD SYSTEMIC DISEASE, NO ACUTE PROBLEMS, NO FUNCTIONAL LIMITATIONS.    Mallampati: Grade 1:  Soft palate, uvula, tonsillar pillars, and posterior pharyngeal wall visible    Lungs: Lungs Clear with good breath sounds bilaterally    Heart: Normal heart sounds and rate    History and physical reviewed and no updates needed. I have reviewed the lab findings, diagnostic data, medications, and the plan for sedation. I have determined this patient to be an appropriate candidate for the planned sedation and procedure and have reassessed the patient IMMEDIATELY PRIOR to sedation and procedure.    Jesus Oreilly MD

## 2017-07-21 NOTE — BRIEF OP NOTE
Interventional Radiology Brief Post Procedure Note    Procedure: CT LIVER BIOPSY    Proceduralist: Amauri Lindsey MD    Assistant: Ramón Pisano MD and None    Time Out: Prior to the start of the procedure and with procedural staff participation, I verbally confirmed the patient s identity using two indicators, relevant allergies, that the procedure was appropriate and matched the consent or emergent situation, and that the correct equipment/implants were available. Immediately prior to starting the procedure I conducted the Time Out with the procedural staff and re-confirmed the patient s name, procedure, and site/side. (The Joint Commission universal protocol was followed.)  Yes    Medications   Medication Event Details Admin User Admin Time       Sedation: IR Nurse Monitored Care   Post Procedure Summary:  Prior to the start of the procedure and with procedural staff participation, I verbally confirmed the patient s identity using two indicators, relevant allergies, that the procedure was appropriate and matched the consent or emergent situation, and that the correct equipment/implants were available. Immediately prior to starting the procedure I conducted the Time Out with the procedural staff and re-confirmed the patient s name, procedure, and site/side. (The Joint Commission universal protocol was followed.)  Yes       Sedatives: Fentanyl and Midazolam (Versed)    Vital signs, airway and pulse oximetry were monitored and remained stable throughout the procedure and sedation was maintained until the procedure was complete.  The patient was monitored by staff until sedation discharge criteria were met.    Patient tolerance: Patient tolerated the procedure well with no immediate complications.    Time of sedation in minutes: 15 Minutes minutes from beginning to end of physician one to one monitoring.          Findings: successful left lobe lesion biopsy    Estimated Blood Loss:  Minimal    Fluoroscopy Time:  minute(s)    SPECIMENS: Core needle biopsy specimens sent for pathological analysis    Complications: 1. None     Condition: Stable    Plan: 2 hrs bedrest. Then dc    Comments: See dictated procedure note for full details.    Ramón Pisano MD

## 2017-07-21 NOTE — PLAN OF CARE
Problem: Goal Outcome Summary  Goal: Goal Outcome Summary  1. Pt will have fevers well controlled  2. Pt will be hemodynamically stable   PT/6c: cx - 1st attempt this AM: pt getting washed up w/ NST; 2nd attempt: pt declines all therapies for today, states she is awaiting a biopsy.

## 2017-07-21 NOTE — PLAN OF CARE
Problem: Goal Outcome Summary  Goal: Goal Outcome Summary  1. Pt will have fevers well controlled  2. Pt will be hemodynamically stable   OT-6C: Cancel. Pt requesting to hold off on therapy today. Will reschedule.

## 2017-07-21 NOTE — PROGRESS NOTES
Interventional Radiology Intra-procedural Nursing Note    Patient Name: Amelia Michel  Medical Record Number: [id number]  Today's Date: July 21, 2017    Start Time: 1550  End of procedure time: 1612  Procedure: liver biopsy  Report given to: 6c RN  Time pt departs:  1619  Provider: dr morris & dr combs    Patient arrives to 2nd floor IR CT room at 1510 via bed with  at bedside. MD here to consent and educate patient and family on procedure and care plan.  Surgical pathology staff here to receive specimen, 4 samples taken.    Total of 1.5 mg versed given  Total of 75 mcg fentanyl given    Patient tolerated procedure well.  Patient transported back to 6c, patient denies pain.    Other Notes:     Raji Argueta

## 2017-07-21 NOTE — PLAN OF CARE
"Problem: Goal Outcome Summary  Goal: Goal Outcome Summary  1. Pt will have fevers well controlled  2. Pt will be hemodynamically stable   /61 (BP Location: Right arm)  Pulse 53  Temp 98  F (36.7  C) (Oral)  Resp 18  Ht 1.48 m (4' 10.25\")  Wt 71.6 kg (157 lb 12.8 oz)  SpO2 99%  BMI 32.7 kg/m2  Adm: 6/19 for fever and chills  Neuro: AO x 4  Cardiac: VSS, rate controlled A-Fib  Respiratory: RA, lung sounds clear  GI/: Incontinent of urine d/t saddle anesthesia, incontinence care provided q 2-3 hours; -BM during shift; no c/o of nausea  Diet/appetite: 2 g Na diet with fair appetite; NPO since MN for CT liver biopsy  Activity: A x 1 with repositioning and walker for transfers  Pain: denied  Skin: intact, no new deficits noted; LUE extravasation dressing CDI; 2-3+ generalized edema  Lines: R PIV SL'ed  Drains: N/A  Plan: Continue to monitor and replace lytes, will transfuse 2 units of platelets prior to CT liver biopsy scheduled later today, incontinence care to prevent skin breakdown      Will continue to monitor and notify Cards 1 of any pertinent changes.          "

## 2017-07-22 ENCOUNTER — HEALTH MAINTENANCE LETTER (OUTPATIENT)
Age: 57
End: 2017-07-22

## 2017-07-22 ENCOUNTER — APPOINTMENT (OUTPATIENT)
Dept: PHYSICAL THERAPY | Facility: CLINIC | Age: 57
DRG: 871 | End: 2017-07-22
Attending: INTERNAL MEDICINE
Payer: COMMERCIAL

## 2017-07-22 ENCOUNTER — APPOINTMENT (OUTPATIENT)
Dept: OCCUPATIONAL THERAPY | Facility: CLINIC | Age: 57
DRG: 871 | End: 2017-07-22
Attending: INTERNAL MEDICINE
Payer: COMMERCIAL

## 2017-07-22 LAB
ANION GAP SERPL CALCULATED.3IONS-SCNC: 10 MMOL/L (ref 3–14)
BUN SERPL-MCNC: 31 MG/DL (ref 7–30)
CALCIUM SERPL-MCNC: 8.4 MG/DL (ref 8.5–10.1)
CHLORIDE SERPL-SCNC: 98 MMOL/L (ref 94–109)
CO2 SERPL-SCNC: 27 MMOL/L (ref 20–32)
CREAT SERPL-MCNC: 0.72 MG/DL (ref 0.52–1.04)
DIFFERENTIAL METHOD BLD: ABNORMAL
EOSINOPHIL # BLD AUTO: 0 10E9/L (ref 0–0.7)
EOSINOPHIL NFR BLD AUTO: 1 %
ERYTHROCYTE [DISTWIDTH] IN BLOOD BY AUTOMATED COUNT: 28 % (ref 10–15)
GFR SERPL CREATININE-BSD FRML MDRD: 83 ML/MIN/1.7M2
GLUCOSE SERPL-MCNC: 102 MG/DL (ref 70–99)
HCT VFR BLD AUTO: 26.1 % (ref 35–47)
HGB BLD-MCNC: 8.3 G/DL (ref 11.7–15.7)
LYMPHOCYTES # BLD AUTO: 0.3 10E9/L (ref 0.8–5.3)
LYMPHOCYTES NFR BLD AUTO: 12 %
MAGNESIUM SERPL-MCNC: 1.7 MG/DL (ref 1.6–2.3)
MCH RBC QN AUTO: 31.2 PG (ref 26.5–33)
MCHC RBC AUTO-ENTMCNC: 31.8 G/DL (ref 31.5–36.5)
MCV RBC AUTO: 98 FL (ref 78–100)
MONOCYTES # BLD AUTO: 0.5 10E9/L (ref 0–1.3)
MONOCYTES NFR BLD AUTO: 21 %
NEUTROPHILS # BLD AUTO: 1.7 10E9/L (ref 1.6–8.3)
NEUTROPHILS NFR BLD AUTO: 66 %
PHOSPHATE SERPL-MCNC: 2.6 MG/DL (ref 2.5–4.5)
PLATELET # BLD AUTO: 43 10E9/L (ref 150–450)
POTASSIUM SERPL-SCNC: 3.9 MMOL/L (ref 3.4–5.3)
RBC # BLD AUTO: 2.66 10E12/L (ref 3.8–5.2)
SODIUM SERPL-SCNC: 135 MMOL/L (ref 133–144)
WBC # BLD AUTO: 2.5 10E9/L (ref 4–11)

## 2017-07-22 PROCEDURE — 99233 SBSQ HOSP IP/OBS HIGH 50: CPT | Performed by: INTERNAL MEDICINE

## 2017-07-22 PROCEDURE — 40000133 ZZH STATISTIC OT WARD VISIT

## 2017-07-22 PROCEDURE — 97110 THERAPEUTIC EXERCISES: CPT | Mod: GP

## 2017-07-22 PROCEDURE — 25000128 H RX IP 250 OP 636: Performed by: INTERNAL MEDICINE

## 2017-07-22 PROCEDURE — 25000132 ZZH RX MED GY IP 250 OP 250 PS 637: Performed by: INTERNAL MEDICINE

## 2017-07-22 PROCEDURE — 85025 COMPLETE CBC W/AUTO DIFF WBC: CPT | Performed by: INTERNAL MEDICINE

## 2017-07-22 PROCEDURE — 25000132 ZZH RX MED GY IP 250 OP 250 PS 637: Performed by: STUDENT IN AN ORGANIZED HEALTH CARE EDUCATION/TRAINING PROGRAM

## 2017-07-22 PROCEDURE — 84100 ASSAY OF PHOSPHORUS: CPT | Performed by: INTERNAL MEDICINE

## 2017-07-22 PROCEDURE — 40000556 ZZH STATISTIC PERIPHERAL IV START W US GUIDANCE

## 2017-07-22 PROCEDURE — 36415 COLL VENOUS BLD VENIPUNCTURE: CPT | Performed by: INTERNAL MEDICINE

## 2017-07-22 PROCEDURE — 25000125 ZZHC RX 250: Performed by: INTERNAL MEDICINE

## 2017-07-22 PROCEDURE — 25000132 ZZH RX MED GY IP 250 OP 250 PS 637: Performed by: NURSE PRACTITIONER

## 2017-07-22 PROCEDURE — 83735 ASSAY OF MAGNESIUM: CPT | Performed by: INTERNAL MEDICINE

## 2017-07-22 PROCEDURE — 25000132 ZZH RX MED GY IP 250 OP 250 PS 637

## 2017-07-22 PROCEDURE — 97116 GAIT TRAINING THERAPY: CPT | Mod: GP

## 2017-07-22 PROCEDURE — 80048 BASIC METABOLIC PNL TOTAL CA: CPT | Performed by: INTERNAL MEDICINE

## 2017-07-22 PROCEDURE — 99231 SBSQ HOSP IP/OBS SF/LOW 25: CPT | Mod: GC | Performed by: INTERNAL MEDICINE

## 2017-07-22 PROCEDURE — 97110 THERAPEUTIC EXERCISES: CPT | Mod: GO

## 2017-07-22 PROCEDURE — 40000193 ZZH STATISTIC PT WARD VISIT

## 2017-07-22 PROCEDURE — 21400006 ZZH R&B CCU INTERMEDIATE UMMC

## 2017-07-22 RX ADMIN — PANTOPRAZOLE SODIUM 40 MG: 40 TABLET, DELAYED RELEASE ORAL at 06:23

## 2017-07-22 RX ADMIN — POTASSIUM CHLORIDE 20 MEQ: 750 TABLET, EXTENDED RELEASE ORAL at 11:13

## 2017-07-22 RX ADMIN — MIDODRINE HYDROCHLORIDE 10 MG: 5 TABLET ORAL at 08:42

## 2017-07-22 RX ADMIN — GABAPENTIN 200 MG: 100 CAPSULE ORAL at 21:41

## 2017-07-22 RX ADMIN — SODIUM PHOSPHATE, MONOBASIC, MONOHYDRATE AND SODIUM PHOSPHATE, DIBASIC, ANHYDROUS 10 MMOL: 276; 142 INJECTION, SOLUTION INTRAVENOUS at 15:07

## 2017-07-22 RX ADMIN — MIDODRINE HYDROCHLORIDE 10 MG: 5 TABLET ORAL at 18:48

## 2017-07-22 RX ADMIN — Medication: at 16:22

## 2017-07-22 RX ADMIN — ATENOLOL 50 MG: 50 TABLET ORAL at 08:43

## 2017-07-22 RX ADMIN — ACETAMINOPHEN 650 MG: 325 TABLET, FILM COATED ORAL at 06:23

## 2017-07-22 RX ADMIN — ATENOLOL 50 MG: 50 TABLET ORAL at 21:41

## 2017-07-22 RX ADMIN — ACETAMINOPHEN 650 MG: 325 TABLET, FILM COATED ORAL at 21:41

## 2017-07-22 RX ADMIN — MIDODRINE HYDROCHLORIDE 10 MG: 5 TABLET ORAL at 12:07

## 2017-07-22 RX ADMIN — FUROSEMIDE 40 MG: 10 INJECTION, SOLUTION INTRAVENOUS at 08:42

## 2017-07-22 RX ADMIN — Medication 25 MG: at 21:41

## 2017-07-22 RX ADMIN — GABAPENTIN 200 MG: 100 CAPSULE ORAL at 15:09

## 2017-07-22 RX ADMIN — MICONAZOLE NITRATE: 2 POWDER TOPICAL at 16:17

## 2017-07-22 RX ADMIN — CALCIUM CARBONATE 600 MG (1,500 MG)-VITAMIN D3 400 UNIT TABLET 2 TABLET: at 08:42

## 2017-07-22 RX ADMIN — PREDNISONE 5 MG: 5 TABLET ORAL at 08:43

## 2017-07-22 RX ADMIN — GABAPENTIN 200 MG: 100 CAPSULE ORAL at 08:42

## 2017-07-22 RX ADMIN — FOLIC ACID 1 MG: 1 TABLET ORAL at 08:42

## 2017-07-22 RX ADMIN — THIAMINE HCL (VITAMIN B1) 50 MG TABLET 50 MG: at 08:43

## 2017-07-22 RX ADMIN — MULTIPLE VITAMINS W/ MINERALS TAB 1 TABLET: TAB at 08:42

## 2017-07-22 RX ADMIN — MICONAZOLE NITRATE: 2 POWDER TOPICAL at 12:00

## 2017-07-22 RX ADMIN — DIGOXIN 125 MCG: 125 TABLET ORAL at 08:43

## 2017-07-22 RX ADMIN — MICONAZOLE NITRATE: 2 POWDER TOPICAL at 21:40

## 2017-07-22 RX ADMIN — Medication 2 G: at 12:51

## 2017-07-22 ASSESSMENT — PAIN DESCRIPTION - DESCRIPTORS: DESCRIPTORS: ACHING

## 2017-07-22 NOTE — PLAN OF CARE
Pt VSS,HR still dips down between 40 and 50.MD aware.Pt still having atrial fib.She had a Liver bx today.upon return site in RUQ is CDI.No c/o pain.Tolerated dressing change on left arm.Tissue yellow,no odor.Pt afebrile.Has generalized edema.and anascara.Brown socks removed,skin intact.compression stocking on.Continue with POC.

## 2017-07-22 NOTE — PROGRESS NOTES
VSS.  Heart rhythm may have converted to sinus at some point yesterday.   Sinus bradycardia noted by monitor tech.  And Afib.  Mild pain from biopsy site on abdomen - MATTHIAS, CDI.  Dressing changed on bone marrow biopsy site this a.m.  Incontinence per baseline.  BM x 1.  Dressing changed on LUE with Morphine gel, honey, and mepilex.  Pt stated dressing changes are once a day as it is healing up nicely.   at bedside and helpful and attentive.  Worked with PT and OT today.  Replaced K+ with 20 meq po x 1.  Replaced Na phos 10 mmol IV x 1  Replaced Mag 2 gm IV x 1.  Platelets are down at 43 from 52 yesterday.  PIV placed today.  Possible Kimball or PAC on Monday as pt will be getting chemo.   Possible transfer to 7D per pt.  Heme Onc is following pt.  Waiting for Biopsy results, should have them by Monday afternoon per MD.  Cont with poc.

## 2017-07-22 NOTE — PLAN OF CARE
Problem: Goal Outcome Summary  Goal: Goal Outcome Summary  1. Pt will have fevers well controlled  2. Pt will be hemodynamically stable   OT/6C:  Pt endorses fatigue this PM limiting participation in therapy.  Pt engages in seated EOB therapeutic exercise with focus on improving UB strength, endurance, and activity tolerance.  Pt demonstrates IND with HEP, which was administered in previous session.  Encouraged pt to complete independently daily, agreeable.       REC:  ARU when medically stable.

## 2017-07-22 NOTE — PROGRESS NOTES
Cardiology Daily Note     Patient: Amelia Michel  MRN: [id number]  Admission Date: 6/19/2017  Hospital Day # 33    Assessment & Plan:   Amelia Michel is a 56 year old female with PMHx of VSD s/p repair (1969), RA, recently diagnosed atrial fibrillation that was complicated with an OOH cardiac arrest (felt 2/2 long pause with degeneration to VFib while converting Afib to SR with multiple AV prieto agents) who was readmitted from ARU on 6/19 with fever and concern for worsening LUE infection. She has had a complicated medical course, and is now on the cardiology service as primary since 6/26 for further workup and evaluation of recurrent lactic acidosis, persistent afib and softer blood pressures with tachypnea. Now with concern for malignancy; likely Bcell lymphoma.    Today's Plans  -Diurese: 40mg IV lasix  -Follow up results of Liver Biopsy when available    # Fever of unknown origin  # Concern for Malignancy, likely B-cell Lymphoma  Previously attributed to LUE wound, though wound has continued to improve without surrounding appearance of cellulitis. Discontinued antibiotics with close monitoring. Summary of work-up -- no DVT on ultrasound 7/5/17, MRI of complete spine without nidus of infection 7/6/17, BRE with vegetations raised possibility of endocarditis, but only 1 major/2 minor Duke's criteria, does not have definitively. Serologies for coxiella and bartonella are negative. Bone marrow biopsy is concerning for B-cell lymphoma (see Hem/Onc note for full detail). - PET-Scan findings consistent with extensive lymphoma  - Patient neutropenic, not to degree to warrant antibiotics for fever at present  - Heme/onc following, appreciate recs   - Peripheral flow cytometry with polytypic Bcells   - RBC folate normal  - Follow up results of Liver Biopsy when available  - Thiamine     # Volume overload/anasarca  # HFrEF (EF 50-55%)  Patient initially up 35# at admission; diuresis challenging, secondary to  third spacing of fluid (albumin 1.9), but responded with addition of midodrine and ongoing albumin resuscitation. Right heart cath 6/26 with mildly elevated right and left sided filling pressures.  - Scheduled 40mg IV lasix QD; monitor BMP  - Continue midodrine to 10 mg TID     # Aortic Masses  BRE on 7/14 showed 3 small masses on the coronary cusps and LVOT. Do not believe this to be IE, does not meet Duke's criteria. ID is following; no antibiotics at this time. Unclear what this represents; possibly could be Libmann-Sacks.  -Continue to monitor  -Will plan on follow up Echo at a later time    # CELSA, improving  Likely prerenal with combination of diuresis and NSAID use for FUO. Also consider contrast injury given CT chest.   - Avoid nephrotoxins    # Atrial fibrillation/flutter with RVR:  RVR in setting of volume overload above. Patient rec'd single dose of diltiazem with associated soft BPs.   - Continue dig 125mcg  - Atenolol 50 mg BID    - Not anticoagulated due to thrombocytopenia    # Out of hospital arrest:  Pt will need to wear LifeVest until medically appropriate and cleared from infection/wound standpoint for ICD implantation likely 4-6 weeks after discharge per EP (pending hospital course for LUE wound).       # Acute on Chronic Thrombocytopenia:   Plts noted to decrease from 104 to as low as 15 during recent hospitalization requiring transfusion w/ chronically low in the 's as outpatient. Possible bactrim was offending agent for exacerbation, and discontinued this medication (exchanged for meropenem in the setting of worsening cellulitis). 2 units platelets given on 7/22 pre-proceduraly.   - Continue to hold RA meds for now  - Holding apixiban (last dose 7/8/17)    # Acute on chronic anemia: Stable  No evidence of overt bleeding. Hgb 6.6 on 7/16/17 likely dilutional with all lines down and ongoing resuscitation with albumin, NS.   - Daily CBC  - 1 U RBCs 7/16/17    # RA:   History of seropositive  rheumatoid arthritis (anti-CCP positive) since late 1980;s w/ multiple MTP osteotomies, partial right wrist fusion, longstanding therapy consisting of MTX, prednisone and HCQ  - Tapered to 5 mg prednisone on 7/17 with continued monitoring for flaring  - Rheumatology following  - Continue to hold HCQ and MTX until outpatient follow-up  - Follow-up with [facility] clinic Dr. Conor Cunha in 4-6 weeks after discharge      # Severe Critical Illness Myopathy:   Significant deconditioning w/ median neuropathies most likely localized to the wrists and ulnar neuropathies most likely localized to the elbows secondary to RA.  - Ongoing extensive PT/OT/SLP    Consulting Services: Nutrition, heme/onc, ID, rheumatology    CODE: Full Code  DVT Ppx: being held due to thrombocytopenia  Diet/Fluids: As above.  Disposition: Pending improvement in above.    Pt's care was discussed with bedside RN and patient during Care Team Rounds.    Patient was discussed and evaluated with Dr. Mcguire.    Mike Alicia MD  Internal Medicine Resident, PGY2  [phone]    CARDIOLOGY STAFF  Patient seen and examined by me.  History and physical examination discussed with Dr. Alciia whose note reflects our joint assessment and recommendation/plans.  We are continuing diuresis.  Wt down 8# since Monday, almost 40# in past month.  She requests PICC line but she may need a Kimball or other central catheter for chemotherapy.  We are awaiting potential transfer to Hematology.  Richy Mcguire    _______________________________________________________________    Subjective & Interval History:   -nursing notes reviewed  -chart reviewed  -No major complaints this AM  -no fevers, chills, SOB more than normal  -Continues to work with PT/OT  -Wolf ( at bedside); he brought in blueberries from their garden    Medications: Reviewed in EPIC. List below for reference    Physical Exam:    Blood pressure 106/51, pulse 60, temperature 99  F (37.2  C),  "temperature source Axillary, resp. rate 18, height 1.48 m (4' 10.25\"), weight 71 kg (156 lb 9.6 oz), SpO2 97 %, not currently breastfeeding.    General: No acute distress. Appears well. Positive affect  Resp: Diminished breath sounds posteriorly; clear anteriorly  CV: Regular rate and rhythm. Crisp S1 and S2. No murmurs, rubs nor gallops   Abdominal: Soft, nondistended. Nontender to palpation. No peritoneal signs. BS+  MSK: 2+ LE edema, 2+ sacral edema as well  Neurological: CN's II-XII grossly intact. Alert and oriented completely.    Labs & Studies of Note: Reviewed in Epic    PET-Scan  IMPRESSION: In this patient with history of abnormal bone marrow  biopsy and clinical suspicion of lymphoma;      1. Findings suggesting extensive lymphoma including;    a. Multiple hypermetabolic mediastinal and single left neck level 2A  lymphadenopathy.    b. Extensive FDG avid bony lesions throughout the axial and  appendicular skeleton. No bone fracture or spinal canal involvement.     c. Numerous hepatic FDG avid lesions.    d. Findings suspicious for cardiac involvement as evidenced by  thickening of the interatrial septum with associated increased FDG  uptake. This can be further evaluated with echocardiogram.    e. FDG avid lesion in the pelvis contiguous with the uterus,  suspicious for uterine involvement.  2. Stable bilateral pleural effusions with associated compression  atelectasis.  3. Mild to moderate ascites. Diffuse soft tissue anasarca.  4. Splenomegaly without associated FDG uptake.    CMP    Recent Labs  Lab 07/22/17  0754 07/21/17  1000 07/20/17  0741 07/19/17  0721 07/18/17  0753  07/16/17  0734    132* 135 136 138  < > 138   POTASSIUM 3.9 3.5 4.0 3.8 3.6  < > 4.1   CHLORIDE 98 96 100 101 103  < > 104   CO2 27 29 27 26 26  < > 26   ANIONGAP 10 7 8 10 8  < > 8   * 84 92 98 90  < > 85   BUN 31* 27 28 30 30  < > 37*   CR 0.72 0.72 0.74 0.71 0.67  < > 0.85   GFRESTIMATED 83 84 81 85 >90Non  " American GFR Calc  < > 69   GFRESTBLACK >90African American GFR Calc >90African American GFR Calc >90African American GFR Calc >90African American GFR Calc >90African American GFR Calc  < > 84   VIKTORIYA 8.4* 9.0 8.6 8.7 8.9  < > 9.1   MAG 1.7  --   --  2.0 1.8  --   --    PHOS 2.6  --   --   --   --   --   --    PROTTOTAL  --   --   --   --   --   --  5.9*   ALBUMIN  --   --   --   --   --   --  3.8   BILITOTAL  --   --   --   --   --   --  1.7*   ALKPHOS  --   --   --   --   --   --  127   AST  --   --   --   --   --   --  56*   ALT  --   --   --   --   --   --  37   < > = values in this interval not displayed.    CBC    Recent Labs  Lab 07/22/17  0754 07/21/17  1000 07/20/17  0741 07/19/17  0721   WBC 2.5* 3.2* 3.0* 2.6*   RBC 2.66* 3.09* 2.88* 2.74*   HGB 8.3* 9.6* 9.0* 8.6*   HCT 26.1* 29.9* 27.6* 26.2*   PLT 43* 53* 31* 29*     INR    Recent Labs  Lab 07/21/17  1000 07/16/17  0734   INR 1.41* 1.41*     Medication List for Reference (delete if desired)  Current Facility-Administered Medications   Medication     furosemide (LASIX) injection 40 mg     traZODone (DESYREL) half-tab 25-50 mg     sodium chloride (PF) 0.9% PF flush 1-74 mL     acetaminophen (TYLENOL) tablet 650 mg     predniSONE (DELTASONE) tablet 5 mg     thiamine tablet 50 mg     folic acid (FOLVITE) tablet 1 mg     atenolol (TENORMIN) tablet 50 mg     midodrine (PROAMATINE) tablet 10 mg     miconazole (MICATIN; MICRO GUARD) 2 % powder     calcium carbonate (TUMS) chewable tablet 500 mg     pantoprazole (PROTONIX) EC tablet 40 mg     digoxin (LANOXIN) tablet 125 mcg     ondansetron (ZOFRAN) tablet 4 mg     prochlorperazine (COMPAZINE) injection 5-10 mg     ondansetron (ZOFRAN) injection 4 mg     simethicone (MYLICON) chewable tablet 80 mg     sodium chloride (PF) 0.9% PF flush 10-20 mL     sodium chloride (PF) 0.9% PF flush 10 mL     heparin lock flush 10 UNIT/ML injection 5-10 mL     dextrose 10 % 1,000 mL infusion     gabapentin (NEURONTIN) capsule 200  mg     melatonin tablet 3 mg     morphine 0.1% in solosite topical gel 2 g     multivitamin, therapeutic with minerals (THERA-VIT-M) tablet 1 tablet     lidocaine 1 % 1 mL     lidocaine (LMX4) kit     sodium chloride (PF) 0.9% PF flush 3 mL     medication instruction     acetaminophen (TYLENOL) Suppository 650 mg     Patient is already receiving anticoagulation with heparin, enoxaparin (LOVENOX), warfarin (COUMADIN)  or other anticoagulant medication     glucose 40 % gel 15-30 g    Or     dextrose 50 % injection 25-50 mL    Or     glucagon injection 1 mg     naloxone (NARCAN) injection 0.1-0.4 mg     calcium-vitamin D (CALTRATE) 600-400 MG-UNIT per tablet 2 tablet     morphine 0.1% in solosite topical gel     potassium chloride SA (K-DUR/KLOR-CON M) CR tablet 20-40 mEq     potassium chloride (KLOR-CON) Packet 20-40 mEq     potassium chloride 10 mEq in 100 mL intermittent infusion     potassium chloride 10 mEq in 100 mL intermittent infusion with 10 mg lidocaine     potassium chloride 20 mEq in 50 mL intermittent infusion     magnesium sulfate 2 g in NS intermittent infusion (PharMEDium or FV Cmpd)     magnesium sulfate 4 g in 100 mL sterile water (premade)     sodium phosphate 10 mmol in D5W intermittent infusion     sodium phosphate 15 mmol in D5W intermittent infusion     sodium phosphate 20 mmol in D5W intermittent infusion     sodium phosphate 25 mmol in D5W intermittent infusion     melatonin tablet 1 mg     traMADol (ULTRAM) half-tab 25 mg

## 2017-07-22 NOTE — PLAN OF CARE
VSS. Mild pain from biopsy site on abdomen. PRN tylenol given for pain. Dressing changed on bone marrow biopsy site. Insertion points are clean, dry, intact with scabs and no signs of infection. Incontinence per baseline. Heart rhythm may have converted to sinus at some point yesterday. Sinus bradycardia noted by monitor tech. MD notified. NNO.

## 2017-07-22 NOTE — PLAN OF CARE
Problem: Goal Outcome Summary  Goal: Goal Outcome Summary  1. Pt will have fevers well controlled  2. Pt will be hemodynamically stable   PT 6C: Ambulates 250ft and 125 ft with w/c follow, SBAx1.  STS IND.  NuStep x10 min at level 1 with reported 6 on OMNI scale.  VSS on 1L.  PT recommends d/c to ARU when medically stable.

## 2017-07-23 LAB
ALBUMIN SERPL-MCNC: 2.5 G/DL (ref 3.4–5)
ALP SERPL-CCNC: 202 U/L (ref 40–150)
ALT SERPL W P-5'-P-CCNC: 33 U/L (ref 0–50)
ANION GAP SERPL CALCULATED.3IONS-SCNC: 10 MMOL/L (ref 3–14)
AST SERPL W P-5'-P-CCNC: 50 U/L (ref 0–45)
BASE DEFICIT BLDV-SCNC: 0.5 MMOL/L
BASOPHILS # BLD AUTO: 0 10E9/L (ref 0–0.2)
BASOPHILS NFR BLD AUTO: 0.9 %
BILIRUB SERPL-MCNC: 1.2 MG/DL (ref 0.2–1.3)
BUN SERPL-MCNC: 25 MG/DL (ref 7–30)
CALCIUM SERPL-MCNC: 8.6 MG/DL (ref 8.5–10.1)
CHLORIDE SERPL-SCNC: 98 MMOL/L (ref 94–109)
CO2 SERPL-SCNC: 26 MMOL/L (ref 20–32)
CREAT SERPL-MCNC: 0.52 MG/DL (ref 0.52–1.04)
DIFFERENTIAL METHOD BLD: ABNORMAL
EOSINOPHIL # BLD AUTO: 0 10E9/L (ref 0–0.7)
EOSINOPHIL NFR BLD AUTO: 0.4 %
ERYTHROCYTE [DISTWIDTH] IN BLOOD BY AUTOMATED COUNT: 28 % (ref 10–15)
GFR SERPL CREATININE-BSD FRML MDRD: ABNORMAL ML/MIN/1.7M2
GLUCOSE SERPL-MCNC: 86 MG/DL (ref 70–99)
HCO3 BLDV-SCNC: 24 MMOL/L (ref 21–28)
HCT VFR BLD AUTO: 26.4 % (ref 35–47)
HGB BLD-MCNC: 8.4 G/DL (ref 11.7–15.7)
IMM GRANULOCYTES # BLD: 0 10E9/L (ref 0–0.4)
IMM GRANULOCYTES NFR BLD: 0.4 %
LACTATE BLD-SCNC: 6.2 MMOL/L (ref 0.7–2.1)
LACTATE BLD-SCNC: 6.6 MMOL/L (ref 0.7–2.1)
LDH SERPL L TO P-CCNC: 214 U/L (ref 81–234)
LYMPHOCYTES # BLD AUTO: 0.4 10E9/L (ref 0.8–5.3)
LYMPHOCYTES NFR BLD AUTO: 17.9 %
MAGNESIUM SERPL-MCNC: 2 MG/DL (ref 1.6–2.3)
MCH RBC QN AUTO: 31.3 PG (ref 26.5–33)
MCHC RBC AUTO-ENTMCNC: 31.8 G/DL (ref 31.5–36.5)
MCV RBC AUTO: 99 FL (ref 78–100)
MONOCYTES # BLD AUTO: 0.4 10E9/L (ref 0–1.3)
MONOCYTES NFR BLD AUTO: 16.6 %
NEUTROPHILS # BLD AUTO: 1.5 10E9/L (ref 1.6–8.3)
NEUTROPHILS NFR BLD AUTO: 63.8 %
NRBC # BLD AUTO: 0 10*3/UL
NRBC BLD AUTO-RTO: 0 /100
O2/TOTAL GAS SETTING VFR VENT: ABNORMAL %
PCO2 BLDV: 39 MM HG (ref 40–50)
PH BLDV: 7.4 PH (ref 7.32–7.43)
PHOSPHATE SERPL-MCNC: 2.8 MG/DL (ref 2.5–4.5)
PLATELET # BLD AUTO: 37 10E9/L (ref 150–450)
PLATELET # BLD EST: ABNORMAL 10*3/UL
PO2 BLDV: 65 MM HG (ref 25–47)
POTASSIUM SERPL-SCNC: 3.3 MMOL/L (ref 3.4–5.3)
POTASSIUM SERPL-SCNC: 3.8 MMOL/L (ref 3.4–5.3)
PROT SERPL-MCNC: 5.4 G/DL (ref 6.8–8.8)
RBC # BLD AUTO: 2.68 10E12/L (ref 3.8–5.2)
SODIUM SERPL-SCNC: 134 MMOL/L (ref 133–144)
URATE SERPL-MCNC: 6.6 MG/DL (ref 2.6–6)
WBC # BLD AUTO: 2.3 10E9/L (ref 4–11)

## 2017-07-23 PROCEDURE — 83605 ASSAY OF LACTIC ACID: CPT | Performed by: INTERNAL MEDICINE

## 2017-07-23 PROCEDURE — 25000132 ZZH RX MED GY IP 250 OP 250 PS 637: Performed by: INTERNAL MEDICINE

## 2017-07-23 PROCEDURE — 25000132 ZZH RX MED GY IP 250 OP 250 PS 637: Performed by: STUDENT IN AN ORGANIZED HEALTH CARE EDUCATION/TRAINING PROGRAM

## 2017-07-23 PROCEDURE — 84132 ASSAY OF SERUM POTASSIUM: CPT | Performed by: PHYSICAL MEDICINE & REHABILITATION

## 2017-07-23 PROCEDURE — 25000125 ZZHC RX 250: Performed by: INTERNAL MEDICINE

## 2017-07-23 PROCEDURE — 83735 ASSAY OF MAGNESIUM: CPT | Performed by: STUDENT IN AN ORGANIZED HEALTH CARE EDUCATION/TRAINING PROGRAM

## 2017-07-23 PROCEDURE — 36415 COLL VENOUS BLD VENIPUNCTURE: CPT | Performed by: INTERNAL MEDICINE

## 2017-07-23 PROCEDURE — 25000128 H RX IP 250 OP 636: Performed by: INTERNAL MEDICINE

## 2017-07-23 PROCEDURE — 80053 COMPREHEN METABOLIC PANEL: CPT | Performed by: STUDENT IN AN ORGANIZED HEALTH CARE EDUCATION/TRAINING PROGRAM

## 2017-07-23 PROCEDURE — P9047 ALBUMIN (HUMAN), 25%, 50ML: HCPCS | Performed by: INTERNAL MEDICINE

## 2017-07-23 PROCEDURE — 84425 ASSAY OF VITAMIN B-1: CPT | Performed by: INTERNAL MEDICINE

## 2017-07-23 PROCEDURE — 25000132 ZZH RX MED GY IP 250 OP 250 PS 637: Performed by: NURSE PRACTITIONER

## 2017-07-23 PROCEDURE — 83615 LACTATE (LD) (LDH) ENZYME: CPT | Performed by: STUDENT IN AN ORGANIZED HEALTH CARE EDUCATION/TRAINING PROGRAM

## 2017-07-23 PROCEDURE — 21400006 ZZH R&B CCU INTERMEDIATE UMMC

## 2017-07-23 PROCEDURE — 83605 ASSAY OF LACTIC ACID: CPT | Performed by: PHYSICAL MEDICINE & REHABILITATION

## 2017-07-23 PROCEDURE — 87040 BLOOD CULTURE FOR BACTERIA: CPT | Performed by: INTERNAL MEDICINE

## 2017-07-23 PROCEDURE — 82803 BLOOD GASES ANY COMBINATION: CPT | Performed by: INTERNAL MEDICINE

## 2017-07-23 PROCEDURE — 85025 COMPLETE CBC W/AUTO DIFF WBC: CPT | Performed by: INTERNAL MEDICINE

## 2017-07-23 PROCEDURE — 84550 ASSAY OF BLOOD/URIC ACID: CPT | Performed by: STUDENT IN AN ORGANIZED HEALTH CARE EDUCATION/TRAINING PROGRAM

## 2017-07-23 PROCEDURE — 25000132 ZZH RX MED GY IP 250 OP 250 PS 637

## 2017-07-23 PROCEDURE — 36415 COLL VENOUS BLD VENIPUNCTURE: CPT | Performed by: STUDENT IN AN ORGANIZED HEALTH CARE EDUCATION/TRAINING PROGRAM

## 2017-07-23 PROCEDURE — 99231 SBSQ HOSP IP/OBS SF/LOW 25: CPT | Mod: GC | Performed by: INTERNAL MEDICINE

## 2017-07-23 PROCEDURE — 84100 ASSAY OF PHOSPHORUS: CPT | Performed by: STUDENT IN AN ORGANIZED HEALTH CARE EDUCATION/TRAINING PROGRAM

## 2017-07-23 RX ORDER — ALBUMIN (HUMAN) 12.5 G/50ML
50 SOLUTION INTRAVENOUS ONCE
Status: COMPLETED | OUTPATIENT
Start: 2017-07-23 | End: 2017-07-23

## 2017-07-23 RX ORDER — LORAZEPAM 1 MG/1
1 TABLET ORAL ONCE
Status: DISCONTINUED | OUTPATIENT
Start: 2017-07-24 | End: 2017-07-29

## 2017-07-23 RX ORDER — ACETAMINOPHEN 500 MG
500 TABLET ORAL EVERY 6 HOURS
Status: DISCONTINUED | OUTPATIENT
Start: 2017-07-23 | End: 2017-07-28

## 2017-07-23 RX ADMIN — POTASSIUM CHLORIDE 40 MEQ: 750 TABLET, EXTENDED RELEASE ORAL at 08:38

## 2017-07-23 RX ADMIN — GABAPENTIN 200 MG: 100 CAPSULE ORAL at 13:10

## 2017-07-23 RX ADMIN — ATENOLOL 50 MG: 50 TABLET ORAL at 20:05

## 2017-07-23 RX ADMIN — Medication 12.5 MG: at 23:38

## 2017-07-23 RX ADMIN — Medication 2 G: at 08:38

## 2017-07-23 RX ADMIN — ACETAMINOPHEN 500 MG: 500 TABLET, FILM COATED ORAL at 20:05

## 2017-07-23 RX ADMIN — PANTOPRAZOLE SODIUM 40 MG: 40 TABLET, DELAYED RELEASE ORAL at 06:23

## 2017-07-23 RX ADMIN — ATENOLOL 50 MG: 50 TABLET ORAL at 08:26

## 2017-07-23 RX ADMIN — MIDODRINE HYDROCHLORIDE 10 MG: 5 TABLET ORAL at 17:57

## 2017-07-23 RX ADMIN — THIAMINE HCL (VITAMIN B1) 50 MG TABLET 50 MG: at 08:26

## 2017-07-23 RX ADMIN — MICONAZOLE NITRATE: 2 POWDER TOPICAL at 22:07

## 2017-07-23 RX ADMIN — MICONAZOLE NITRATE: 2 POWDER TOPICAL at 08:29

## 2017-07-23 RX ADMIN — GABAPENTIN 200 MG: 100 CAPSULE ORAL at 08:26

## 2017-07-23 RX ADMIN — MIDODRINE HYDROCHLORIDE 10 MG: 5 TABLET ORAL at 08:26

## 2017-07-23 RX ADMIN — CALCIUM CARBONATE 600 MG (1,500 MG)-VITAMIN D3 400 UNIT TABLET 2 TABLET: at 08:26

## 2017-07-23 RX ADMIN — ALBUMIN (HUMAN) 50 G: 12.5 SOLUTION INTRAVENOUS at 19:40

## 2017-07-23 RX ADMIN — MULTIPLE VITAMINS W/ MINERALS TAB 1 TABLET: TAB at 08:27

## 2017-07-23 RX ADMIN — FUROSEMIDE 40 MG: 10 INJECTION, SOLUTION INTRAVENOUS at 08:29

## 2017-07-23 RX ADMIN — PREDNISONE 5 MG: 5 TABLET ORAL at 08:27

## 2017-07-23 RX ADMIN — MIDODRINE HYDROCHLORIDE 10 MG: 5 TABLET ORAL at 11:22

## 2017-07-23 RX ADMIN — POTASSIUM CHLORIDE 20 MEQ: 750 TABLET, EXTENDED RELEASE ORAL at 11:22

## 2017-07-23 RX ADMIN — ALBUMIN (HUMAN) 50 G: 12.5 SOLUTION INTRAVENOUS at 13:10

## 2017-07-23 RX ADMIN — ACETAMINOPHEN 650 MG: 325 TABLET, FILM COATED ORAL at 10:24

## 2017-07-23 RX ADMIN — FOLIC ACID 1 MG: 1 TABLET ORAL at 08:26

## 2017-07-23 RX ADMIN — ACETAMINOPHEN 500 MG: 500 TABLET, FILM COATED ORAL at 13:54

## 2017-07-23 RX ADMIN — Medication: at 13:53

## 2017-07-23 RX ADMIN — POTASSIUM CHLORIDE 20 MEQ: 750 TABLET, EXTENDED RELEASE ORAL at 18:01

## 2017-07-23 RX ADMIN — GABAPENTIN 200 MG: 100 CAPSULE ORAL at 21:19

## 2017-07-23 RX ADMIN — DIGOXIN 125 MCG: 125 TABLET ORAL at 08:26

## 2017-07-23 NOTE — PROGRESS NOTES
"VSS.  Heart rhythm may have converted to sinus at some point yesterday.   Sinus bradycardia/sinus noted by monitor tech.  And Afib.  Mild pain from biopsy site on abdomen - MATTHIAS, CDI.  Dressing changed on bone marrow biopsy site this PM.  Applied tegaderm with \"no sting barrier\" on Left Iliac crest.  Incontinence per baseline.  BM x 1.  Dressing changed on LUE with Morphine gel, honey, and mepilex.  Pt stated dressing changes are once a day as it is healing up nicely.  Worked with PT and OT today.  Replaced K+ with 20 meq po x 1.  Replaced Na phos 10 mmol IV x 1  Replaced Mag 2 gm IV x 1.  Platelets are down at 43 from 52 yesterday.  PIV placed today.  Possible Kimball or PAC on Monday as pt will be getting chemo.   Possible transfer to 7D per pt. At some point, unsure when, as pt will transfer to Heme/onc service possibly Monday.  Heme Onc is following pt.  Waiting for Biopsy results, should have them by Monday afternoon per MD.  C/o back pain from working with PT/OT.  Pt stated \"I really worked hard today, so I can get out of here\".  Tylenol given.  Trazadone given for sleep.  Cont with poc.  "

## 2017-07-23 NOTE — PROGRESS NOTES
HEMATOLOGY -- Follow Up Note    Met with patient and  to update them on the status of our work-up and treatment planning.  Explained that although the bone marrow findings and PET scan results are highly suggestive of lymphoma, we need to be certain of the diagnosis in order to initiate the most appropriate treatment plan.  Also cannot discuss prognosis in much detail without a clear diagnosis.  Explained that there are many different types of lymphoma, as well as some things that can mimic lymphoma, and that treatment options may differ substantially.  Our hope is that the liver biopsy obtained yesterday will give us the necessary information to proceed, and that we do not expect results until Monday, probably late in the day (this is not guaranteed).  At this point, there are no other diagnostic tests that need to be done pending the results of the liver biopsy, and no specific treatment (other than optimizing her other medical issues) to be initiated.  I also discussed her case with Dr. Nany Chadwick who is currently attending in the inpatient heme malignancy service, and would be the accepting physician should transfer to that team be indicated based on biopsy results.    Total time 35 minutes.      Christos Quiles MD  Associate Professor of Medicine  Division of Hematology, Oncology, and Transplantation  Director, [facility]

## 2017-07-23 NOTE — PROGRESS NOTES
[facility], [place]    Sepsis Evaluation Progress Note    Date of Service: 07/23/2017    I was called to see Amelia Michel due to abnormal vital signs triggering the Sepsis SIRS screening alert. She is not known to have an infection.     Physical Exam    Vital Signs:  Temp: 100.2  F (37.9  C) Temp src: Oral BP: 111/54   Heart Rate: 61 Resp: 20 SpO2: 98 % O2 Device: Nasal cannula Oxygen Delivery: 2 LPM    Lab:  Lactic Acid   Date Value Ref Range Status   07/23/2017 6.6 (HH) 0.7 - 2.1 mmol/L Final     Comment:     Critical Value called to and read back by  THA VIRK 6C [id number] BY SERGO         The patient is at baseline mental status.    The rest of their physical exam is significant for the following:  General: Sitting up in chair, NAD, conversational.   Lungs: Normal respiratory effort without tachypnea. Diminished in bilateral bases. No focal area of crackles, wheezes.   CV: Irregular, normal rate. No m/g/r.   Abdominal: Soft, nondistended. Normoactive bowel sounds. Liver biopsy site clean, dry without surrounding redness, drainage or tenderness.     Assessment and Plan    The SIRS and exam findings are likely due to lymphoma, there is no sign of sepsis at this time.     Disposition: The patient will remain on the current unit. We will continue to monitor this patient closely.  - Will add on VBG, thiamine level  - Will repeat 50 gm 25% albumin  - For comfort, patient has PRN tylenol available for fevers.     Kelly Smith MD

## 2017-07-23 NOTE — PLAN OF CARE
Problem: Goal Outcome Summary  Goal: Goal Outcome Summary  1. Pt will have fevers well controlled  2. Pt will be hemodynamically stable   PT 6C: Cancel.  Pt declines PT today due to fever, lethargy and fatigue.  States she would like to participate but unable to today.   Will reschedule tomorrow.

## 2017-07-23 NOTE — PROGRESS NOTES
[facility], [place]    Sepsis Evaluation Progress Note    Date of Service: 07/23/2017    I was called to see Amelia Michel due to abnormal vital signs triggering the Sepsis SIRS screening alert. She is not known to have an infection.     Physical Exam    Vital Signs:  Temp: 100.2  F (37.9  C) Temp src: Oral BP: 106/67   Heart Rate: 61 Resp: 20 SpO2: 98 % O2 Device: Nasal cannula Oxygen Delivery: 2 LPM    Lab:  Lactic Acid   Date Value Ref Range Status   07/23/2017 6.2 (HH) 0.7 - 2.1 mmol/L Final     Comment:     Critical Value called to and read back by  DAYNA CHOE [id number]         The patient is at baseline mental status.    The rest of their physical exam is significant for temperature to 100.2, diaphoresis. Otherwise, she is at her baseline state of health.    Assessment and Plan    The SIRS and exam findings are likely due to underlying diffuse Bcell lymphoma, there is no sign of sepsis at this time.     -Blood Cultures   -Albumin 50g IV  -Recheck Lactate in 4 hours    Disposition: The patient will remain on the current unit. We will continue to monitor this patient closely.    Mike Alicia MD  Internal Medicine Resident, PGY2  [phone]

## 2017-07-23 NOTE — PROGRESS NOTES
Cardiology Daily Note     Patient: Amelia Michel  MRN: [id number]  Admission Date: 6/19/2017  Hospital Day # 34    Assessment & Plan:   Amelia Michel is a 56 year old female with PMHx of VSD s/p repair (1969), RA, recently diagnosed atrial fibrillation that was complicated with an OOH cardiac arrest (felt 2/2 long pause with degeneration to VFib while converting Afib to SR with multiple AV prieto agents) who was readmitted from ARU on 6/19 with fever and concern for worsening LUE infection. She has had a complicated medical course, and is now on the cardiology service as primary since 6/26 for further workup and evaluation of recurrent lactic acidosis, persistent afib and softer blood pressures with tachypnea. Now with concern for malignancy; likely Bcell lymphoma.    Today's Plans  -Diurese: 40mg IV lasix  -Follow up results of Liver Biopsy when available  -Plan for Cardiac MRI w/ contrast to assess for infiltrative cardiac disease    Fever of unknown origin  Concern for Malignancy, likely B-cell Lymphoma  Previously attributed to LUE wound, though wound has continued to improve without surrounding appearance of cellulitis. Discontinued antibiotics with close monitoring. Summary of work-up -- no DVT on ultrasound 7/5/17, MRI of complete spine without nidus of infection 7/6/17, BRE with vegetations raised possibility of endocarditis, but only 1 major/2 minor Duke's criteria, does not have definitively. Serologies for coxiella and bartonella are negative. Bone marrow biopsy is concerning for B-cell lymphoma (see Hem/Onc note for full detail). - PET-Scan findings consistent with extensive lymphoma  - Patient neutropenic, not to degree to warrant antibiotics for fever at present  - Heme/onc following, appreciate recs   - Peripheral flow cytometry with polytypic Bcells   - RBC folate normal  - Follow up results of Liver Biopsy when available  - Thiamine     Volume overload/anasarca  HFrEF (EF  50-55%)  Patient initially up 35# at admission; diuresis challenging, secondary to third spacing of fluid (albumin 1.9), but responded with addition of midodrine and ongoing albumin resuscitation. Right heart cath 6/26 with mildly elevated right and left sided filling pressures.  -Plan for Cardiac MRI w/ contrast to assess for infiltrative cardiac disease  - Scheduled 40mg IV lasix QD; monitor BMP  - Continue midodrine to 10 mg TID     Aortic Masses  BRE on 7/14 showed 3 small masses on the coronary cusps and LVOT. Do not believe this to be IE, does not meet Duke's criteria. ID is following; no antibiotics at this time. Unclear what this represents; possibly could be Libmann-Sacks.  -Continue to monitor  -Will plan on follow up Echo at a later time    CELSA, improving  Likely prerenal with combination of diuresis and NSAID use for FUO. Also consider contrast injury given CT chest.   - Avoid nephrotoxins    Atrial fibrillation/flutter with RVR  RVR in setting of volume overload above. Patient rec'd single dose of diltiazem with associated soft BPs.   - Continue dig 125mcg  - Atenolol 50 mg BID    - Not anticoagulated due to thrombocytopenia    Out of hospital arrest  Pt will need to wear LifeVest until medically appropriate and cleared from infection/wound standpoint for ICD implantation likely 4-6 weeks after discharge per EP (pending hospital course for LUE wound).       Acute on Chronic Thrombocytopenia   Plts noted to decrease from 104 to as low as 15 during recent hospitalization requiring transfusion w/ chronically low in the 's as outpatient. Possible bactrim was offending agent for exacerbation, and discontinued this medication (exchanged for meropenem in the setting of worsening cellulitis). 2 units platelets given on 7/22 pre-proceduraly.   - Continue to hold RA meds for now  - Holding apixiban (last dose 7/8/17)    Acute on chronic anemia: Stable  No evidence of overt bleeding. Hgb 6.6 on 7/16/17  likely dilutional with all lines down and ongoing resuscitation with albumin, NS.   - Daily CBC  - 1 U RBCs 7/16/17    RA  History of seropositive rheumatoid arthritis (anti-CCP positive) since late 1980;s w/ multiple MTP osteotomies, partial right wrist fusion, longstanding therapy consisting of MTX, prednisone and HCQ  - Tapered to 5 mg prednisone on 7/17 with continued monitoring for flaring  - Rheumatology following  - Continue to hold HCQ and MTX until outpatient follow-up  - Follow-up with [facility] Dr. Conor Cunha in 4-6 weeks after discharge      Severe Critical Illness Myopathy   Significant deconditioning w/ median neuropathies most likely localized to the wrists and ulnar neuropathies most likely localized to the elbows secondary to RA.  - Ongoing extensive PT/OT/SLP    Consulting Services: Nutrition, heme/onc, ID, rheumatology    CODE: Full Code  DVT Ppx: being held due to thrombocytopenia  Diet/Fluids: 2g Na limit  Disposition: Transfer to Hem/Onc pending tissue diagnosis    Pt's care was discussed with bedside RN and patient during Care Team Rounds.    Patient was discussed and evaluated with Dr. Mcguire.    Mike Alicia MD  Internal Medicine Resident, PGY2  [phone]      CARDIOLOGY STAFF  Patient seen and examined by me.  History and physical examination discussed with Dr. Alicia whose note reflects our joint assessment and recommendation/plans.  Ms. Michel feels reasonably well.  She continues diuresis.  We are awaiting biopsy results and possible transfer to Hematology for lymphoma.  In this regard, she would likely receive potentially cardiotoxic medications.  Given her unusual cardiac presentation, we will arrange a cardiac MRI to evaluate for evidence of infiltrative disease.  Richy Mcguire    _______________________________________________________________    Subjective & Interval History:   -nursing notes reviewed  -chart reviewed  -No major complaints this AM  -no  "fevers, chills, SOB more than normal  -Continues to work with PT/OT    Medications: Reviewed in EPIC. List below for reference    Physical Exam:    Blood pressure 117/63, pulse 60, temperature 100.4  F (38  C), temperature source Axillary, resp. rate 18, height 1.48 m (4' 10.25\"), weight 71.8 kg (158 lb 4.8 oz), SpO2 98 %, not currently breastfeeding.    General: Lying in bed sleeping soundly. No acute distress. Appears well.   Resp: Diminished breath sounds posteriorly; clear anteriorly  CV: Regular rate and rhythm. Crisp S1 and S2. No murmurs, rubs nor gallops   Abdominal: Soft, nondistended. Nontender to palpation. No peritoneal signs. BS+  MSK: 2+ LE edema, 2+ sacral edema as well. Wraps on legs.  Neurological: CN's II-XII grossly intact. Alert and oriented completely.    Labs & Studies of Note: Reviewed in Epic    PET-Scan  IMPRESSION: In this patient with history of abnormal bone marrow  biopsy and clinical suspicion of lymphoma;      1. Findings suggesting extensive lymphoma including;    a. Multiple hypermetabolic mediastinal and single left neck level 2A  lymphadenopathy.    b. Extensive FDG avid bony lesions throughout the axial and  appendicular skeleton. No bone fracture or spinal canal involvement.     c. Numerous hepatic FDG avid lesions.    d. Findings suspicious for cardiac involvement as evidenced by  thickening of the interatrial septum with associated increased FDG  uptake. This can be further evaluated with echocardiogram.    e. FDG avid lesion in the pelvis contiguous with the uterus,  suspicious for uterine involvement.  2. Stable bilateral pleural effusions with associated compression  atelectasis.  3. Mild to moderate ascites. Diffuse soft tissue anasarca.  4. Splenomegaly without associated FDG uptake.    CMP    Recent Labs  Lab 07/23/17  0650 07/22/17  0754 07/21/17  1000 07/20/17  0741 07/19/17  0721 07/18/17  0753    135 132* 135 136 138   POTASSIUM 3.3* 3.9 3.5 4.0 3.8 3.6 "   CHLORIDE 98 98 96 100 101 103   CO2 26 27 29 27 26 26   ANIONGAP 10 10 7 8 10 8   GLC 86 102* 84 92 98 90   BUN 25 31* 27 28 30 30   CR 0.52 0.72 0.72 0.74 0.71 0.67   GFRESTIMATED >90Non  GFR Calc 83 84 81 85 >90Non  GFR Calc   GFRESTBLACK >90African American GFR Calc >90African American GFR Calc >90African American GFR Calc >90African American GFR Calc >90African American GFR Calc >90African American GFR Calc   VIKTORIYA 8.6 8.4* 9.0 8.6 8.7 8.9   MAG 2.0 1.7  --   --  2.0 1.8   PHOS 2.8 2.6  --   --   --   --    PROTTOTAL 5.4*  --   --   --   --   --    ALBUMIN 2.5*  --   --   --   --   --    BILITOTAL 1.2  --   --   --   --   --    ALKPHOS 202*  --   --   --   --   --    AST 50*  --   --   --   --   --    ALT 33  --   --   --   --   --        CBC    Recent Labs  Lab 07/23/17  0650 07/22/17  0754 07/21/17  1000 07/20/17  0741   WBC 2.3* 2.5* 3.2* 3.0*   RBC 2.68* 2.66* 3.09* 2.88*   HGB 8.4* 8.3* 9.6* 9.0*   HCT 26.4* 26.1* 29.9* 27.6*   PLT 37* 43* 53* 31*     INR    Recent Labs  Lab 07/21/17  1000   INR 1.41*     Medication List for Reference (delete if desired)  Current Facility-Administered Medications   Medication     morphine 0.1% in solosite topical gel 2 g     furosemide (LASIX) injection 40 mg     traZODone (DESYREL) half-tab 25-50 mg     sodium chloride (PF) 0.9% PF flush 1-74 mL     acetaminophen (TYLENOL) tablet 650 mg     predniSONE (DELTASONE) tablet 5 mg     thiamine tablet 50 mg     folic acid (FOLVITE) tablet 1 mg     atenolol (TENORMIN) tablet 50 mg     midodrine (PROAMATINE) tablet 10 mg     miconazole (MICATIN; MICRO GUARD) 2 % powder     pantoprazole (PROTONIX) EC tablet 40 mg     digoxin (LANOXIN) tablet 125 mcg     ondansetron (ZOFRAN) tablet 4 mg     prochlorperazine (COMPAZINE) injection 5-10 mg     ondansetron (ZOFRAN) injection 4 mg     simethicone (MYLICON) chewable tablet 80 mg     sodium chloride (PF) 0.9% PF flush 10-20 mL     sodium chloride (PF) 0.9% PF  flush 10 mL     heparin lock flush 10 UNIT/ML injection 5-10 mL     dextrose 10 % 1,000 mL infusion     gabapentin (NEURONTIN) capsule 200 mg     melatonin tablet 3 mg     multivitamin, therapeutic with minerals (THERA-VIT-M) tablet 1 tablet     lidocaine 1 % 1 mL     lidocaine (LMX4) kit     sodium chloride (PF) 0.9% PF flush 3 mL     medication instruction     acetaminophen (TYLENOL) Suppository 650 mg     Patient is already receiving anticoagulation with heparin, enoxaparin (LOVENOX), warfarin (COUMADIN)  or other anticoagulant medication     glucose 40 % gel 15-30 g    Or     dextrose 50 % injection 25-50 mL    Or     glucagon injection 1 mg     naloxone (NARCAN) injection 0.1-0.4 mg     calcium-vitamin D (CALTRATE) 600-400 MG-UNIT per tablet 2 tablet     morphine 0.1% in solosite topical gel     potassium chloride SA (K-DUR/KLOR-CON M) CR tablet 20-40 mEq     potassium chloride (KLOR-CON) Packet 20-40 mEq     potassium chloride 10 mEq in 100 mL intermittent infusion     potassium chloride 10 mEq in 100 mL intermittent infusion with 10 mg lidocaine     potassium chloride 20 mEq in 50 mL intermittent infusion     magnesium sulfate 2 g in NS intermittent infusion (PharMEDium or FV Cmpd)     magnesium sulfate 4 g in 100 mL sterile water (premade)     sodium phosphate 10 mmol in D5W intermittent infusion     sodium phosphate 15 mmol in D5W intermittent infusion     sodium phosphate 20 mmol in D5W intermittent infusion     sodium phosphate 25 mmol in D5W intermittent infusion     melatonin tablet 1 mg     traMADol (ULTRAM) half-tab 25 mg

## 2017-07-23 NOTE — PLAN OF CARE
Problem: Goal Outcome Summary  Goal: Goal Outcome Summary  1. Pt will have fevers well controlled  2. Pt will be hemodynamically stable   Outcome: No Change  VSS. Sinus shel[place]. Slept all shift.

## 2017-07-23 NOTE — PLAN OF CARE
Problem: Goal Outcome Summary  Goal: Goal Outcome Summary  1. Pt will have fevers well controlled  2. Pt will be hemodynamically stable   Pt alert and oriented.  VSS on 1L NC.  Mg and K+ replaced per protocol, K+ recheck at 1600.  Pt triggered sepsis protocol, lactic acid 6.2.  Albumin administered per orders, blood cultures drawn.  Lactic recheck at 1600.  Scheduled tylenol for temp.  Cardiac MRI tomorrow, one time dose of ativan before d/t claustrophobia.  Awaiting biopsy results.  Continue w/ POC and notify team w/ changes/concerns.

## 2017-07-23 NOTE — PROVIDER NOTIFICATION
07/23/17 1200   Call Information   Date of Call 07/23/17   Time of Call 1203   Name of person requesting the team Lenore   Title of person requesting team RN   RRT Arrival time 1204   Time RRT ended 1215   Reason for call   Type of RRT Adult   Primary reason for call Sepsis suspected   Sepsis Suspected Elevated Lactate level   Was patient transferred from the ED, ICU, or PACU within last 24 hours prior to RRT call? No   SBAR   Situation Lactic Acid = 6.2   Background vegitation vs scarring on BRE; intermittent fever; healing, necrotic arm wound; lymphoma w/u pending   Notable History/Conditions Cardiac;Cancer   Assessment A+O, No distress; VSS except low-grade temp   Interventions Labs  (Albumin)   Patient Outcome   Patient Outcome Stabilized on unit   RRT Team   Attending/Primary/Covering Physician CARDS 1   Date Attending Physician notified 07/23/17   Time Attending Physician notified 1203   Physician(s) Mike Alicia   Lead RN Juanita Grover   RT Lisandra   Post RRT Intervention Assessment   Post RRT Assessment Stable/Improved   Date Follow Up Done 07/23/17   Time Follow Up Done 1516   Comments Awaiting redraw of LA. Patient ambulating in halls. Stable on 6C

## 2017-07-24 ENCOUNTER — APPOINTMENT (OUTPATIENT)
Dept: GENERAL RADIOLOGY | Facility: CLINIC | Age: 57
DRG: 871 | End: 2017-07-24
Attending: INTERNAL MEDICINE
Payer: COMMERCIAL

## 2017-07-24 ENCOUNTER — APPOINTMENT (OUTPATIENT)
Dept: OCCUPATIONAL THERAPY | Facility: CLINIC | Age: 57
DRG: 871 | End: 2017-07-24
Attending: INTERNAL MEDICINE
Payer: COMMERCIAL

## 2017-07-24 ENCOUNTER — APPOINTMENT (OUTPATIENT)
Dept: MRI IMAGING | Facility: CLINIC | Age: 57
DRG: 871 | End: 2017-07-24
Attending: INTERNAL MEDICINE
Payer: COMMERCIAL

## 2017-07-24 LAB
ANION GAP SERPL CALCULATED.3IONS-SCNC: 15 MMOL/L (ref 3–14)
BASOPHILS # BLD AUTO: 0 10E9/L (ref 0–0.2)
BASOPHILS NFR BLD AUTO: 0.8 %
BUN SERPL-MCNC: 27 MG/DL (ref 7–30)
CALCIUM SERPL-MCNC: 8.6 MG/DL (ref 8.5–10.1)
CHLORIDE SERPL-SCNC: 96 MMOL/L (ref 94–109)
CO2 SERPL-SCNC: 22 MMOL/L (ref 20–32)
COPATH REPORT: NORMAL
CREAT SERPL-MCNC: 0.71 MG/DL (ref 0.52–1.04)
DIFFERENTIAL METHOD BLD: ABNORMAL
EOSINOPHIL # BLD AUTO: 0 10E9/L (ref 0–0.7)
EOSINOPHIL NFR BLD AUTO: 0.4 %
ERYTHROCYTE [DISTWIDTH] IN BLOOD BY AUTOMATED COUNT: 28.3 % (ref 10–15)
GFR SERPL CREATININE-BSD FRML MDRD: 85 ML/MIN/1.7M2
GLUCOSE SERPL-MCNC: 86 MG/DL (ref 70–99)
HCT VFR BLD AUTO: 23.6 % (ref 35–47)
HGB BLD-MCNC: 7.6 G/DL (ref 11.7–15.7)
IMM GRANULOCYTES # BLD: 0 10E9/L (ref 0–0.4)
IMM GRANULOCYTES NFR BLD: 0.4 %
INTERPRETATION ECG - MUSE: NORMAL
LACTATE BLD-SCNC: 7.1 MMOL/L (ref 0.7–2.1)
LYMPHOCYTES # BLD AUTO: 0.5 10E9/L (ref 0.8–5.3)
LYMPHOCYTES NFR BLD AUTO: 22.4 %
MAGNESIUM SERPL-MCNC: 2 MG/DL (ref 1.6–2.3)
MCH RBC QN AUTO: 31.5 PG (ref 26.5–33)
MCHC RBC AUTO-ENTMCNC: 32.2 G/DL (ref 31.5–36.5)
MCV RBC AUTO: 98 FL (ref 78–100)
MONOCYTES # BLD AUTO: 0.4 10E9/L (ref 0–1.3)
MONOCYTES NFR BLD AUTO: 16 %
NEUTROPHILS # BLD AUTO: 1.4 10E9/L (ref 1.6–8.3)
NEUTROPHILS NFR BLD AUTO: 60 %
NRBC # BLD AUTO: 0 10*3/UL
NRBC BLD AUTO-RTO: 0 /100
PLATELET # BLD AUTO: 30 10E9/L (ref 150–450)
POTASSIUM SERPL-SCNC: 3.7 MMOL/L (ref 3.4–5.3)
RBC # BLD AUTO: 2.41 10E12/L (ref 3.8–5.2)
SODIUM SERPL-SCNC: 134 MMOL/L (ref 133–144)
WBC # BLD AUTO: 2.4 10E9/L (ref 4–11)

## 2017-07-24 PROCEDURE — 75561 CARDIAC MRI FOR MORPH W/DYE: CPT

## 2017-07-24 PROCEDURE — 25000132 ZZH RX MED GY IP 250 OP 250 PS 637

## 2017-07-24 PROCEDURE — 25000132 ZZH RX MED GY IP 250 OP 250 PS 637: Performed by: NURSE PRACTITIONER

## 2017-07-24 PROCEDURE — 25000125 ZZHC RX 250: Performed by: INTERNAL MEDICINE

## 2017-07-24 PROCEDURE — 83605 ASSAY OF LACTIC ACID: CPT | Performed by: INTERNAL MEDICINE

## 2017-07-24 PROCEDURE — 25000132 ZZH RX MED GY IP 250 OP 250 PS 637: Performed by: INTERNAL MEDICINE

## 2017-07-24 PROCEDURE — 71010 XR CHEST PORT 1 VW: CPT

## 2017-07-24 PROCEDURE — 36415 COLL VENOUS BLD VENIPUNCTURE: CPT | Performed by: INTERNAL MEDICINE

## 2017-07-24 PROCEDURE — 80048 BASIC METABOLIC PNL TOTAL CA: CPT | Performed by: INTERNAL MEDICINE

## 2017-07-24 PROCEDURE — 25000132 ZZH RX MED GY IP 250 OP 250 PS 637: Performed by: STUDENT IN AN ORGANIZED HEALTH CARE EDUCATION/TRAINING PROGRAM

## 2017-07-24 PROCEDURE — 25000128 H RX IP 250 OP 636: Performed by: INTERNAL MEDICINE

## 2017-07-24 PROCEDURE — 85025 COMPLETE CBC W/AUTO DIFF WBC: CPT | Performed by: INTERNAL MEDICINE

## 2017-07-24 PROCEDURE — A9585 GADOBUTROL INJECTION: HCPCS | Performed by: RADIOLOGY

## 2017-07-24 PROCEDURE — 25000128 H RX IP 250 OP 636: Performed by: RADIOLOGY

## 2017-07-24 PROCEDURE — 40000133 ZZH STATISTIC OT WARD VISIT: Performed by: OCCUPATIONAL THERAPIST

## 2017-07-24 PROCEDURE — 83735 ASSAY OF MAGNESIUM: CPT | Performed by: INTERNAL MEDICINE

## 2017-07-24 PROCEDURE — 99231 SBSQ HOSP IP/OBS SF/LOW 25: CPT | Mod: GC | Performed by: INTERNAL MEDICINE

## 2017-07-24 PROCEDURE — 97535 SELF CARE MNGMENT TRAINING: CPT | Mod: GO | Performed by: OCCUPATIONAL THERAPIST

## 2017-07-24 PROCEDURE — 21400006 ZZH R&B CCU INTERMEDIATE UMMC

## 2017-07-24 RX ORDER — GADOBUTROL 604.72 MG/ML
10 INJECTION INTRAVENOUS ONCE
Status: COMPLETED | OUTPATIENT
Start: 2017-07-24 | End: 2017-07-24

## 2017-07-24 RX ADMIN — GABAPENTIN 200 MG: 100 CAPSULE ORAL at 13:28

## 2017-07-24 RX ADMIN — ACETAMINOPHEN 500 MG: 500 TABLET, FILM COATED ORAL at 08:56

## 2017-07-24 RX ADMIN — PREDNISONE 5 MG: 5 TABLET ORAL at 08:56

## 2017-07-24 RX ADMIN — ACETAMINOPHEN 500 MG: 500 TABLET, FILM COATED ORAL at 02:15

## 2017-07-24 RX ADMIN — MIDODRINE HYDROCHLORIDE 5 MG: 5 TABLET ORAL at 08:55

## 2017-07-24 RX ADMIN — MULTIPLE VITAMINS W/ MINERALS TAB 1 TABLET: TAB at 17:55

## 2017-07-24 RX ADMIN — PANTOPRAZOLE SODIUM 40 MG: 40 TABLET, DELAYED RELEASE ORAL at 06:16

## 2017-07-24 RX ADMIN — MICONAZOLE NITRATE: 2 POWDER TOPICAL at 11:00

## 2017-07-24 RX ADMIN — MIDODRINE HYDROCHLORIDE 10 MG: 5 TABLET ORAL at 17:56

## 2017-07-24 RX ADMIN — MIDODRINE HYDROCHLORIDE 5 MG: 5 TABLET ORAL at 09:03

## 2017-07-24 RX ADMIN — FUROSEMIDE 40 MG: 10 INJECTION, SOLUTION INTRAVENOUS at 08:39

## 2017-07-24 RX ADMIN — ACETAMINOPHEN 500 MG: 500 TABLET, FILM COATED ORAL at 20:12

## 2017-07-24 RX ADMIN — ACETAMINOPHEN 500 MG: 500 TABLET, FILM COATED ORAL at 13:28

## 2017-07-24 RX ADMIN — POTASSIUM CHLORIDE 20 MEQ: 750 TABLET, EXTENDED RELEASE ORAL at 09:03

## 2017-07-24 RX ADMIN — GABAPENTIN 200 MG: 100 CAPSULE ORAL at 20:12

## 2017-07-24 RX ADMIN — DIGOXIN 125 MCG: 125 TABLET ORAL at 08:54

## 2017-07-24 RX ADMIN — GABAPENTIN 200 MG: 100 CAPSULE ORAL at 08:54

## 2017-07-24 RX ADMIN — MICONAZOLE NITRATE: 2 POWDER TOPICAL at 20:12

## 2017-07-24 RX ADMIN — CALCIUM CARBONATE 600 MG (1,500 MG)-VITAMIN D3 400 UNIT TABLET 2 TABLET: at 17:56

## 2017-07-24 RX ADMIN — GADOBUTROL 10 ML: 604.72 INJECTION INTRAVENOUS at 16:04

## 2017-07-24 RX ADMIN — FOLIC ACID 1 MG: 1 TABLET ORAL at 17:56

## 2017-07-24 RX ADMIN — ATENOLOL 50 MG: 50 TABLET ORAL at 08:54

## 2017-07-24 RX ADMIN — THIAMINE HCL (VITAMIN B1) 50 MG TABLET 50 MG: at 17:55

## 2017-07-24 RX ADMIN — MIDODRINE HYDROCHLORIDE 10 MG: 5 TABLET ORAL at 13:27

## 2017-07-24 RX ADMIN — Medication: at 17:01

## 2017-07-24 ASSESSMENT — PAIN DESCRIPTION - DESCRIPTORS: DESCRIPTORS: CONSTANT

## 2017-07-24 NOTE — PLAN OF CARE
Problem: Goal Outcome Summary  Goal: Goal Outcome Summary  1. Pt will have fevers well controlled  2. Pt will be hemodynamically stable   PT 6C: Cancel. Per RN, pt not appropriate at this time.  Not feeling well.  Will reschedule on 7/26.

## 2017-07-24 NOTE — PROGRESS NOTES
Elevated LA, 7.1 (See RRT note). Significant difference from previous shifts, more SOB, increase in swelling around flanks, hips and LE.  A/Ox 4 but nursing concerned about LOC, somnolent but still able to arouse to voice.  Sleeping between cares and refusing therapies. SOB has improved after daily dose of IV lasix (RR 26-28 to 20-22). NPO for cardiac MRI. Float Float escorting pt to MRI. Ativan held d/t LOC. Waiting on liver bx results. Continue to monitor Resp and mental status.

## 2017-07-24 NOTE — SIGNIFICANT EVENT
LA 7.1 RRT called 0946. C1 also notified. VSS. SR. SOB improved after IV lasix. C1 resident rounded at bedside. No intervention. RRT canceled 0950. Will continue to monitor.

## 2017-07-24 NOTE — PLAN OF CARE
Problem: Goal Outcome Summary  Goal: Goal Outcome Summary  1. Pt will have fevers well controlled  2. Pt will be hemodynamically stable   Outcome: No Change  VSS. Sinus rhythm. Denies pain. Scheduled Tylenol for fevers. Afebrile this shift. Slept well between cares. Cardiac MRI ordered for today.  Writer will call to verify what time so that patient can be effectively pre-medicated for anxiety.    Monitor tech notified writer that patient had 4 beats of Vtach at the end of evening shift. No further Vtach noted.

## 2017-07-24 NOTE — PROGRESS NOTES
Cardiology Daily Note     Patient: Amelia Michel  MRN: [id number]  Admission Date: 6/19/2017  Hospital Day # 35    Assessment & Plan:   Amelia Michel is a 56 year old female with PMHx of VSD s/p repair (1969), RA, recently diagnosed atrial fibrillation that was complicated with an OOH cardiac arrest (felt 2/2 long pause with degeneration to VFib while converting Afib to SR with multiple AV prieto agents) who was readmitted from ARU on 6/19 with fever and concern for worsening LUE infection. She has had a complicated medical course, and is now on the cardiology service as primary since 6/26 for further workup and evaluation of recurrent lactic acidosis, persistent afib and softer blood pressures with tachypnea. Now with concern for malignancy; likely Bcell lymphoma.    Today's Plans  -Diurese: Increased to 40mg IV BID  -Follow up results of Liver Biopsy when available  -Cardiac MRI w/ contrast to assess for infiltrative cardiac disease  -Plan to transfer to Hem/Onc when biopsy results return    Fever of unknown origin  Recurrent Sepsis Protocol  Lactic Acidosis  Concern for Malignancy, likely B-cell Lymphoma  Previously attributed to LUE wound, though wound has continued to improve without surrounding appearance of cellulitis. Discontinued antibiotics with close monitoring. Summary of work-up -- no DVT on ultrasound 7/5/17, MRI of complete spine without nidus of infection 7/6/17, BRE with vegetations raised possibility of endocarditis, but only 1 major/2 minor Duke's criteria, does not have definitively. Serologies for coxiella and bartonella are negative. Bone marrow biopsy is concerning for B-cell lymphoma (see Hem/Onc note for full detail). - PET-Scan findings consistent with extensive lymphoma. Underlying lymphoma is likely explanation for lactic acidosis.  - Patient neutropenic, not to degree to warrant antibiotics for fever at present  - Heme/onc following, appreciate recs   - Peripheral flow  cytometry with polytypic Bcells   - RBC folate normal  - Follow up results of Liver Biopsy when available  - Thiamine; as lymphoma can deplete thiamine stores, shunting metabolism to lactate production    Volume overload/anasarca  HFrEF (EF 50-55%)  Patient initially up 35# at admission; diuresis challenging, secondary to third spacing of fluid (albumin 1.9), but responded with addition of midodrine and ongoing albumin resuscitation. Right heart cath 6/26 with mildly elevated right and left sided filling pressures.  -Plan for Cardiac MRI w/ contrast to assess for infiltrative cardiac disease  - Scheduled 40mg IV lasix BID; monitor BMP  - Continue midodrine to 10 mg TID     Aortic Masses  BRE on 7/14 showed 3 small masses on the coronary cusps and LVOT. Do not believe this to be IE, does not meet Duke's criteria. ID is following; no antibiotics at this time. Unclear what this represents; possibly could be Libmann-Sacks.  -Continue to monitor  -May plan for follow up Echo at a later time    CELSA, improved  Likely prerenal with combination of diuresis and NSAID use for FUO. Also consider contrast injury given CT chest.   - Avoid nephrotoxins    Atrial fibrillation/flutter with RVR  RVR in setting of volume overload above. Patient rec'd single dose of diltiazem with associated soft BPs.   - Continue dig 125mcg  - Atenolol 50 mg BID    - Not anticoagulated due to thrombocytopenia    Out of hospital arrest  Pt will need to wear LifeVest until medically appropriate and cleared from infection/wound standpoint for ICD implantation likely 4-6 weeks after discharge per EP (pending hospital course for LUE wound).       Acute on Chronic Thrombocytopenia   Plts noted to decrease from 104 to as low as 15 during recent hospitalization requiring transfusion w/ chronically low in the 's as outpatient. Possible bactrim was offending agent for exacerbation, and discontinued this medication (exchanged for meropenem in the setting  "of worsening cellulitis). 2 units platelets given on 7/22 pre-proceduraly.   - Continue to hold RA meds for now  - Holding apixiban (last dose 7/8/17)    Acute on chronic anemia: Stable  No evidence of overt bleeding. Hgb 6.6 on 7/16/17 likely dilutional with all lines down and ongoing resuscitation with albumin, NS.   - Daily CBC  - 1 U RBCs 7/16/17    RA  History of seropositive rheumatoid arthritis (anti-CCP positive) since late 1980;s w/ multiple MTP osteotomies, partial right wrist fusion, longstanding therapy consisting of MTX, prednisone and HCQ  - Tapered to 5 mg prednisone on 7/17 with continued monitoring for flaring  - Rheumatology following  - Continue to hold HCQ and MTX until outpatient follow-up  - Follow-up with [facility] Rheumatology clinic Dr. Conor Cunha in 4-6 weeks after discharge      Severe Critical Illness Myopathy   Significant deconditioning w/ median neuropathies most likely localized to the wrists and ulnar neuropathies most likely localized to the elbows secondary to RA.  - Ongoing extensive PT/OT/SLP    Consulting Services: Nutrition, heme/onc, ID, rheumatology    CODE: Full Code  DVT Ppx: being held due to thrombocytopenia  Diet/Fluids: 2g Na limit  Disposition: Transfer to Hem/Onc pending tissue diagnosis    Pt's care was discussed with bedside RN and patient during Care Team Rounds.    Patient was discussed and evaluated with Dr. Gandara.    Mike Alicia MD  Internal Medicine Resident, PGY2  [phone]  _______________________________________________________________    Subjective & Interval History:   -nursing notes reviewed  -chart reviewed  -Continues to trigger sepsis protocol with elevated lactate  -no fevers, chills, SOB more than normal  -Continues to work with PT/OT    Medications: Reviewed in EPIC. List below for reference    Physical Exam:    Blood pressure 102/62, pulse 57, temperature 98  F (36.7  C), temperature source Oral, resp. rate 20, height 1.48 m (4' 10.25\"), " weight 72.2 kg (159 lb 3.2 oz), SpO2 96 %, not currently breastfeeding.    General: Lying in bed sleeping soundly. No acute distress. Appears well.   Resp: Diminished breath sounds posteriorly; clear anteriorly  CV: Regular rate and rhythm. Crisp S1 and S2. No murmurs, rubs nor gallops   Abdominal: Soft, nondistended. Nontender to palpation. No peritoneal signs. BS+  MSK: 2+ LE edema, 2+ sacral edema as well. Wraps on legs.  Neurological: CN's II-XII grossly intact. Alert and oriented completely.    Labs & Studies of Note: Reviewed in Epic    PET-Scan  IMPRESSION: In this patient with history of abnormal bone marrow  biopsy and clinical suspicion of lymphoma;      1. Findings suggesting extensive lymphoma including;    a. Multiple hypermetabolic mediastinal and single left neck level 2A  lymphadenopathy.    b. Extensive FDG avid bony lesions throughout the axial and  appendicular skeleton. No bone fracture or spinal canal involvement.     c. Numerous hepatic FDG avid lesions.    d. Findings suspicious for cardiac involvement as evidenced by  thickening of the interatrial septum with associated increased FDG  uptake. This can be further evaluated with echocardiogram.    e. FDG avid lesion in the pelvis contiguous with the uterus,  suspicious for uterine involvement.  2. Stable bilateral pleural effusions with associated compression  atelectasis.  3. Mild to moderate ascites. Diffuse soft tissue anasarca.  4. Splenomegaly without associated FDG uptake.    CMP    Recent Labs  Lab 07/24/17  0653 07/23/17  1618 07/23/17  0650 07/22/17  0754 07/21/17  1000  07/19/17  0721     --  134 135 132*  < > 136   POTASSIUM 3.7 3.8 3.3* 3.9 3.5  < > 3.8   CHLORIDE 96  --  98 98 96  < > 101   CO2 22  --  26 27 29  < > 26   ANIONGAP 15*  --  10 10 7  < > 10   GLC 86  --  86 102* 84  < > 98   BUN 27  --  25 31* 27  < > 30   CR 0.71  --  0.52 0.72 0.72  < > 0.71   GFRESTIMATED 85  --  >90Non  GFR Calc 83 84  < > 85    GFRESTBLACK >90African American GFR Calc  --  >90African American GFR Calc >90African American GFR Calc >90African American GFR Calc  < > >90African American GFR Calc   VIKTORIYA 8.6  --  8.6 8.4* 9.0  < > 8.7   MAG 2.0  --  2.0 1.7  --   --  2.0   PHOS  --   --  2.8 2.6  --   --   --    PROTTOTAL  --   --  5.4*  --   --   --   --    ALBUMIN  --   --  2.5*  --   --   --   --    BILITOTAL  --   --  1.2  --   --   --   --    ALKPHOS  --   --  202*  --   --   --   --    AST  --   --  50*  --   --   --   --    ALT  --   --  33  --   --   --   --    < > = values in this interval not displayed.    CBC    Recent Labs  Lab 07/24/17  0653 07/23/17  0650 07/22/17  0754 07/21/17  1000   WBC 2.4* 2.3* 2.5* 3.2*   RBC 2.41* 2.68* 2.66* 3.09*   HGB 7.6* 8.4* 8.3* 9.6*   HCT 23.6* 26.4* 26.1* 29.9*   PLT 30* 37* 43* 53*     INR    Recent Labs  Lab 07/21/17  1000   INR 1.41*     Medication List for Reference (delete if desired)  Current Facility-Administered Medications   Medication     acetaminophen (TYLENOL) tablet 500 mg     LORazepam (ATIVAN) tablet 1 mg     traZODone (DESYREL) quarter-tab 12.5 mg     morphine 0.1% in solosite topical gel 2 g     furosemide (LASIX) injection 40 mg     sodium chloride (PF) 0.9% PF flush 1-74 mL     acetaminophen (TYLENOL) tablet 650 mg     predniSONE (DELTASONE) tablet 5 mg     thiamine tablet 50 mg     folic acid (FOLVITE) tablet 1 mg     atenolol (TENORMIN) tablet 50 mg     midodrine (PROAMATINE) tablet 10 mg     miconazole (MICATIN; MICRO GUARD) 2 % powder     pantoprazole (PROTONIX) EC tablet 40 mg     digoxin (LANOXIN) tablet 125 mcg     ondansetron (ZOFRAN) tablet 4 mg     prochlorperazine (COMPAZINE) injection 5-10 mg     ondansetron (ZOFRAN) injection 4 mg     simethicone (MYLICON) chewable tablet 80 mg     sodium chloride (PF) 0.9% PF flush 10-20 mL     sodium chloride (PF) 0.9% PF flush 10 mL     heparin lock flush 10 UNIT/ML injection 5-10 mL     dextrose 10 % 1,000 mL infusion      gabapentin (NEURONTIN) capsule 200 mg     melatonin tablet 3 mg     multivitamin, therapeutic with minerals (THERA-VIT-M) tablet 1 tablet     lidocaine 1 % 1 mL     lidocaine (LMX4) kit     sodium chloride (PF) 0.9% PF flush 3 mL     medication instruction     acetaminophen (TYLENOL) Suppository 650 mg     Patient is already receiving anticoagulation with heparin, enoxaparin (LOVENOX), warfarin (COUMADIN)  or other anticoagulant medication     glucose 40 % gel 15-30 g    Or     dextrose 50 % injection 25-50 mL    Or     glucagon injection 1 mg     naloxone (NARCAN) injection 0.1-0.4 mg     calcium-vitamin D (CALTRATE) 600-400 MG-UNIT per tablet 2 tablet     morphine 0.1% in solosite topical gel     potassium chloride SA (K-DUR/KLOR-CON M) CR tablet 20-40 mEq     potassium chloride (KLOR-CON) Packet 20-40 mEq     potassium chloride 10 mEq in 100 mL intermittent infusion     potassium chloride 10 mEq in 100 mL intermittent infusion with 10 mg lidocaine     potassium chloride 20 mEq in 50 mL intermittent infusion     magnesium sulfate 2 g in NS intermittent infusion ([facility] or [facility] Cmpd)     magnesium sulfate 4 g in 100 mL sterile water (premade)     sodium phosphate 10 mmol in D5W intermittent infusion     sodium phosphate 15 mmol in D5W intermittent infusion     sodium phosphate 20 mmol in D5W intermittent infusion     sodium phosphate 25 mmol in D5W intermittent infusion     melatonin tablet 1 mg     traMADol (ULTRAM) half-tab 25 mg       I interviewed and examined the patient with the house staff.  I agree with the assessment and plan as documented.    Selvin Gandara MD, PhD  Professor of Medicine  Division of Cardiology

## 2017-07-24 NOTE — PROGRESS NOTES
CLINICAL NUTRITION SERVICES - REASSESSMENT NOTE     Nutrition Prescription    RECOMMENDATIONS FOR MDs/PROVIDERS TO ORDER:  None at this time.    Future/Additional Recommendations:  1. Continue diet as ordered. Encourage high protein options, as able. Also, encourage intake of beneprotein.   2. Continue calcium/vitamin D as ordered as pt remains on prednisone.  3. Thiamine supplementation as per team.   4. Consider discontinuation of folic acid supplementation, if able. Folate lab was 41.7 on 7/8/17.      EVALUATION OF THE PROGRESS TOWARD GOALS   Diet: 2 g Sodium. Has a prn supplement order. Has orders for one cup of 1% milk with a packet of beneprotein prn with meals.   Intake: Good diet tolerance. Flowsheets indicate pt consuming 100% of meals with a good appetite 7/17, % of meals with a good appetite 7/18, 100% of meals and a snack with a fair to good appetite 7/19, 100% of meals with a  good appetite 7/20, % of meals with a good appetite 7/22, and 100% of meals with a good appetite 7/23. During prior nutrition note, pt felt limited by her diet order (cardiac at that time). Pt has been preferring cold food. Attempts x 2 to see pt. Pt was sleeping during first attempt and then busy with RN during second attempt. Pt does have some outside food, such as walnuts, and her  may be bringing her additional food. Over the past three days, pt requested one to three meals via room service. Meals ordered 7/23 totaled 1169 kcals and 40 g protein which does not meet estimated needs below.      NEW FINDINGS   WOC nurse note on 7/19: Large area of epidermal loss with potential full thickness skin loss at area of necrosis 2/2 extravasation.  Measurements improved and slough is loosening well with use of Medihoney. No note of pressure injury in note.    ASSESSED NUTRITION NEEDS (updated)  Dosing Weight: 50 kg (adjusted, based on lowest wt this admission of 71 kg on 7/22/17)  Estimated Energy Needs: 8277-6765  kcals/day (25 - 30 kcals/kg)  Justification: Rec low end of range at this time (decreased needs with wt status but increased needs due to stress factors, healing)  Estimated Protein Needs: 60-75 grams protein/day (1.2 - 1.5 grams of pro/kg)  Justification: Increased needs due to stress factors, healing  Estimated Fluid Needs: 2935-9260 mL/day (25 - 30 mL/kg)   Justification: Maintenance needs or per team, pending fluid status    MALNUTRITION  % Intake: Decreased intake does not meet criteria  % Weight Loss: Difficult to assess with changes in fluid status, diuresis  Subcutaneous Fat Loss: None observed, but difficult to assess with fluid status per previous nutrition note. Pt sleeping or busy during attempts to see.   Muscle Loss: None observed, but difficult to assess with fluid status per previous nutrition note. Pt sleeping or busy during attempts to see.   Fluid Accumulation/Edema: Mild-Moderate. Mild to moderate ascites per PET 7/18  Malnutrition Diagnosis: Patient does not meet two of the above criteria necessary for diagnosing malnutrition but is at risk for malnutrition    Previous Goals   Patient to consume % of nutritionally adequate meal trays TID, or the equivalent with supplements/snacks.  Evaluation: Not met    Previous Nutrition Diagnosis  Inadequate oral intake related to food choices, restrictive diet, LOS, and nausea at times as evidenced by food/beverages sent 7/12-7/16 totaled 923 kcals and 48 g protein daily on average which does not meet estimated needs of 0440-6945 kcals/day (25 - 30 kcals/kg) and 61-77 grams protein/day (1.2 - 1.5 grams of pro/kg).  Evaluation:Unchanged.    CURRENT NUTRITION DIAGNOSIS  Inadequate oral intake related to food choices, LOS, and nausea at times as evidenced by pt consuming 50% of meals at times and meals ordered 7/23 totaled 1169 kcals and 40 g protein which does not meet estimated needs of 1938-3938 kcals/day (25 - 30 kcals/kg) and 60-75 grams protein/day  (1.2 - 1.5 grams of pro/kg).    INTERVENTIONS  Implementation  None additionally at this time.    Goals  Patient to consume % of nutritionally adequate meal trays TID, or the equivalent with supplements/snacks.    Monitoring/Evaluation  Progress toward goals will be monitored and evaluated per protocol.      Nutrition will continue to follow.    Inés Brown MS, RD, LD, [facility]   6C Pgr:  [phone]

## 2017-07-24 NOTE — PLAN OF CARE
Problem: Individualization  Goal: Patient Preferences  Outcome: No Change  Pt admitted for sepsis. Pt has significant past medical history please see H and P. Pt currently alert and oriented. Pt is up with assist of 1 and walker. Pt is incontinent of urine and stool secondary to saddel paresis.Pt is requiring 2 l o2 to maintain sats in the 90's. Pt eating and taking pills fine. Pt has PIV which is SL. Pt has left arm wound d/t extravasation of an IV. Wound looks CDI and healing well. Pt's has multiple abnormal labs felt to be D/T new B cell lymphoma . Pt received albumin and electrolytes replaced. Pt has generalized edema, leg wraps in place. Pt has been in SB and dysrhythmia, low grade temp at beginning of shift but afebrile with administration of tylenol 500 mg po. POC cardiac MRI and possible marrufo placement tomorrow. Awaiting all test results to determine.

## 2017-07-24 NOTE — PLAN OF CARE
Problem: Goal Outcome Summary  Goal: Goal Outcome Summary  1. Pt will have fevers well controlled  2. Pt will be hemodynamically stable   Edema 6C: Recommend continued use of thigh-high Jobst compression stockings during the day and velcro compression garments during the night for management of chronic LE edema - see patient care order for details. Skin remains intact with area of redness noted on R foot, 2+ pitting in proximal portion of LEs.

## 2017-07-24 NOTE — PROGRESS NOTES
07/24/17 0900   Call Information   Date of Call 07/24/17   Name of person requesting the team Ana   Title of person requesting team RN   RRT Arrival time 0950   Time RRT ended 0952   Reason for call   Type of RRT Adult   Primary reason for call Sepsis suspected   Sepsis Suspected Elevated Lactate level   Was patient transferred from the ED, ICU, or PACU within last 24 hours prior to RRT call? No   SBAR   Situation Lactic Acid = 7.1   Background New diagnosed B-Cell lymphoma   Notable History/Conditions Cancer;Cardiac   Assessment Afebrile, AOx4, no distress   Interventions No interventions   Patient Outcome   Patient Outcome Stabilized on unit   RRT Team   Attending/Primary/Covering Physician CARDS 1   Date Attending Physician notified 07/24/17   Time Attending Physician notified 0948   Physician(s) Mike Alicia   Lead RN Bernadine WYMAN   Post RRT Intervention Assessment   Post RRT Assessment Stable/Improved   Date Follow Up Done 07/24/17   Time Follow Up Done 1150   Comments Afebrile, VSS remain stable. Per primary nurse, no change in assessment

## 2017-07-24 NOTE — PROGRESS NOTES
[facility], [place]    Sepsis Evaluation Progress Note    Date of Service: 07/24/2017    I was called to see Amelia Michel due to abnormal vital signs triggering the Sepsis SIRS screening alert. She is not known to have an infection.     Physical Exam    Vital Signs:  Temp: 98  F (36.7  C) Temp src: Oral BP: 102/62 Pulse: 57 Heart Rate: 55 Resp: 20 SpO2: 96 % O2 Device: Nasal cannula Oxygen Delivery: 2 LPM    Lab:  Lactic Acid   Date Value Ref Range Status   07/24/2017 7.1 (HH) 0.7 - 2.1 mmol/L Final     Comment:     Critical Value called to and read back by  Ana Arevalo UU6C RN 07/24/17 @ 0945 BY 1922         The patient is at baseline mental status.    The rest of their physical exam is significant for anasarca, crackles in posterior lungs.    Assessment and Plan    The SIRS and exam findings are likely due to underlying lymphoma, there is no sign of sepsis at this time.    Disposition: The patient will remain on the current unit. We will continue to monitor this patient closely.    Mike Alicia MD  Internal Medicine Resident, PGY2  [phone]    I interviewed and examined the patient with the house staff.  I agree with the assessment and plan as documented.  Selvin Gandara MD, PhD  Professor of Medicine  Division of Cardiology  Selvin Gandara MD, PhD

## 2017-07-24 NOTE — PLAN OF CARE
Problem: Goal Outcome Summary  Goal: Goal Outcome Summary  1. Pt will have fevers well controlled  2. Pt will be hemodynamically stable   OT/6C: Cancel. Attempted pt x 2 this AM, pt and RN endorsing significant fatigue and decreased activity tolerance this date. Pt agreeable to check back from PT this PM as pt remains motivated for therapy.

## 2017-07-25 LAB
ANION GAP SERPL CALCULATED.3IONS-SCNC: 10 MMOL/L (ref 3–14)
BASOPHILS # BLD AUTO: 0 10E9/L (ref 0–0.2)
BASOPHILS NFR BLD AUTO: 0.5 %
BUN SERPL-MCNC: 23 MG/DL (ref 7–30)
CALCIUM SERPL-MCNC: 8.9 MG/DL (ref 8.5–10.1)
CHLORIDE SERPL-SCNC: 99 MMOL/L (ref 94–109)
CO2 SERPL-SCNC: 26 MMOL/L (ref 20–32)
COPATH REPORT: NORMAL
CREAT SERPL-MCNC: 0.61 MG/DL (ref 0.52–1.04)
DIFFERENTIAL METHOD BLD: ABNORMAL
EOSINOPHIL # BLD AUTO: 0 10E9/L (ref 0–0.7)
EOSINOPHIL NFR BLD AUTO: 0 %
ERYTHROCYTE [DISTWIDTH] IN BLOOD BY AUTOMATED COUNT: 28.4 % (ref 10–15)
GFR SERPL CREATININE-BSD FRML MDRD: NORMAL ML/MIN/1.7M2
GLUCOSE SERPL-MCNC: 71 MG/DL (ref 70–99)
HCT VFR BLD AUTO: 24.4 % (ref 35–47)
HGB BLD-MCNC: 7.7 G/DL (ref 11.7–15.7)
IMM GRANULOCYTES # BLD: 0 10E9/L (ref 0–0.4)
IMM GRANULOCYTES NFR BLD: 1 %
LYMPHOCYTES # BLD AUTO: 0.3 10E9/L (ref 0.8–5.3)
LYMPHOCYTES NFR BLD AUTO: 16.9 %
MCH RBC QN AUTO: 31.2 PG (ref 26.5–33)
MCHC RBC AUTO-ENTMCNC: 31.6 G/DL (ref 31.5–36.5)
MCV RBC AUTO: 99 FL (ref 78–100)
MONOCYTES # BLD AUTO: 0.4 10E9/L (ref 0–1.3)
MONOCYTES NFR BLD AUTO: 20.5 %
NEUTROPHILS # BLD AUTO: 1.2 10E9/L (ref 1.6–8.3)
NEUTROPHILS NFR BLD AUTO: 61.1 %
NRBC # BLD AUTO: 0 10*3/UL
NRBC BLD AUTO-RTO: 0 /100
PLATELET # BLD AUTO: 27 10E9/L (ref 150–450)
PLATELET # BLD EST: ABNORMAL 10*3/UL
POTASSIUM SERPL-SCNC: 4 MMOL/L (ref 3.4–5.3)
RBC # BLD AUTO: 2.47 10E12/L (ref 3.8–5.2)
SODIUM SERPL-SCNC: 135 MMOL/L (ref 133–144)
WBC # BLD AUTO: 2 10E9/L (ref 4–11)

## 2017-07-25 PROCEDURE — 25000132 ZZH RX MED GY IP 250 OP 250 PS 637: Performed by: INTERNAL MEDICINE

## 2017-07-25 PROCEDURE — 25000132 ZZH RX MED GY IP 250 OP 250 PS 637: Performed by: NURSE PRACTITIONER

## 2017-07-25 PROCEDURE — 80048 BASIC METABOLIC PNL TOTAL CA: CPT | Performed by: INTERNAL MEDICINE

## 2017-07-25 PROCEDURE — 25000128 H RX IP 250 OP 636: Performed by: INTERNAL MEDICINE

## 2017-07-25 PROCEDURE — 25000132 ZZH RX MED GY IP 250 OP 250 PS 637: Performed by: STUDENT IN AN ORGANIZED HEALTH CARE EDUCATION/TRAINING PROGRAM

## 2017-07-25 PROCEDURE — 25000132 ZZH RX MED GY IP 250 OP 250 PS 637

## 2017-07-25 PROCEDURE — 25000125 ZZHC RX 250: Performed by: INTERNAL MEDICINE

## 2017-07-25 PROCEDURE — 36415 COLL VENOUS BLD VENIPUNCTURE: CPT | Performed by: INTERNAL MEDICINE

## 2017-07-25 PROCEDURE — 85025 COMPLETE CBC W/AUTO DIFF WBC: CPT | Performed by: INTERNAL MEDICINE

## 2017-07-25 PROCEDURE — 21400006 ZZH R&B CCU INTERMEDIATE UMMC

## 2017-07-25 PROCEDURE — 99231 SBSQ HOSP IP/OBS SF/LOW 25: CPT | Mod: GC | Performed by: INTERNAL MEDICINE

## 2017-07-25 RX ORDER — TORSEMIDE 10 MG/1
40 TABLET ORAL
Status: DISCONTINUED | OUTPATIENT
Start: 2017-07-25 | End: 2017-07-28

## 2017-07-25 RX ADMIN — MIDODRINE HYDROCHLORIDE 10 MG: 5 TABLET ORAL at 09:28

## 2017-07-25 RX ADMIN — GABAPENTIN 200 MG: 100 CAPSULE ORAL at 14:02

## 2017-07-25 RX ADMIN — ACETAMINOPHEN 500 MG: 500 TABLET, FILM COATED ORAL at 14:02

## 2017-07-25 RX ADMIN — ATENOLOL 50 MG: 50 TABLET ORAL at 09:09

## 2017-07-25 RX ADMIN — MIDODRINE HYDROCHLORIDE 10 MG: 5 TABLET ORAL at 12:54

## 2017-07-25 RX ADMIN — MULTIPLE VITAMINS W/ MINERALS TAB 1 TABLET: TAB at 09:09

## 2017-07-25 RX ADMIN — MICONAZOLE NITRATE: 2 POWDER TOPICAL at 10:00

## 2017-07-25 RX ADMIN — MIDODRINE HYDROCHLORIDE 10 MG: 5 TABLET ORAL at 19:02

## 2017-07-25 RX ADMIN — GABAPENTIN 200 MG: 100 CAPSULE ORAL at 09:10

## 2017-07-25 RX ADMIN — PREDNISONE 5 MG: 5 TABLET ORAL at 09:10

## 2017-07-25 RX ADMIN — ACETAMINOPHEN 500 MG: 500 TABLET, FILM COATED ORAL at 19:45

## 2017-07-25 RX ADMIN — THIAMINE HCL (VITAMIN B1) 50 MG TABLET 50 MG: at 09:10

## 2017-07-25 RX ADMIN — DIGOXIN 125 MCG: 125 TABLET ORAL at 09:09

## 2017-07-25 RX ADMIN — FUROSEMIDE 40 MG: 10 INJECTION, SOLUTION INTRAVENOUS at 09:11

## 2017-07-25 RX ADMIN — TORSEMIDE 40 MG: 20 TABLET ORAL at 16:12

## 2017-07-25 RX ADMIN — PANTOPRAZOLE SODIUM 40 MG: 40 TABLET, DELAYED RELEASE ORAL at 05:40

## 2017-07-25 RX ADMIN — ACETAMINOPHEN 500 MG: 500 TABLET, FILM COATED ORAL at 02:27

## 2017-07-25 RX ADMIN — CALCIUM CARBONATE 600 MG (1,500 MG)-VITAMIN D3 400 UNIT TABLET 1 TABLET: at 09:27

## 2017-07-25 RX ADMIN — ACETAMINOPHEN 500 MG: 500 TABLET, FILM COATED ORAL at 09:11

## 2017-07-25 RX ADMIN — POTASSIUM CHLORIDE 20 MEQ: 750 TABLET, EXTENDED RELEASE ORAL at 09:27

## 2017-07-25 RX ADMIN — MICONAZOLE NITRATE: 2 POWDER TOPICAL at 19:44

## 2017-07-25 RX ADMIN — CALCIUM CARBONATE 600 MG (1,500 MG)-VITAMIN D3 400 UNIT TABLET 1 TABLET: at 09:09

## 2017-07-25 RX ADMIN — GABAPENTIN 200 MG: 100 CAPSULE ORAL at 21:17

## 2017-07-25 RX ADMIN — FOLIC ACID 1 MG: 1 TABLET ORAL at 09:09

## 2017-07-25 NOTE — PROGRESS NOTES
No acute changes. C/o SOB but more active today. Requiring 1 L NC. Up in chair x 2. Needing minimal assistance OOB. More alert today and engaged with staff. Torsemide added to diuresis plan. Medium-large voids every two hours.  Anticipating transfer to hem/onc service. Continue to monitor.

## 2017-07-25 NOTE — PLAN OF CARE
Problem: Goal Outcome Summary  Goal: Goal Outcome Summary  1. Pt will have fevers well controlled  2. Pt will be hemodynamically stable   Monitor SB, VSS. Wearing 1L O2/nc. Appeared to sleep well overnight. Incontinent of large amounts of urine. Had small BM. No issues or complaints overnight. Awaiting biopsy results. Notify MD with any issues or questions.

## 2017-07-25 NOTE — PLAN OF CARE
"Problem: Goal Outcome Summary  Goal: Goal Outcome Summary  1. Pt will have fevers well controlled  2. Pt will be hemodynamically stable   Outcome: No Change  Pt VSS; SB with HR in the upper 50s; 1L NC with sats in the upper 90s. No complaints of pain or discomfort. Pt returned from cardiac MRI at 1630; stated she was \"feeling better than this morning.\" Denied SOB; A&O x4. Evening HR 55; notified MD about atenolol dose and was advised to hold. Continues to be incontinent of urine; BM+. L arm wound dressing changed per orders. Awaiting liver biopsy results to confirm diagnosis of lymphoma. Plan to transferred to hem/onc unit when results available. Continue to monitor and notify team with changes.       "

## 2017-07-26 ENCOUNTER — APPOINTMENT (OUTPATIENT)
Dept: OCCUPATIONAL THERAPY | Facility: CLINIC | Age: 57
DRG: 871 | End: 2017-07-26
Attending: INTERNAL MEDICINE
Payer: COMMERCIAL

## 2017-07-26 ENCOUNTER — APPOINTMENT (OUTPATIENT)
Dept: PHYSICAL THERAPY | Facility: CLINIC | Age: 57
DRG: 871 | End: 2017-07-26
Attending: INTERNAL MEDICINE
Payer: COMMERCIAL

## 2017-07-26 LAB
ANION GAP SERPL CALCULATED.3IONS-SCNC: 11 MMOL/L (ref 3–14)
ANION GAP SERPL CALCULATED.3IONS-SCNC: 12 MMOL/L (ref 3–14)
BASOPHILS # BLD AUTO: 0 10E9/L (ref 0–0.2)
BASOPHILS NFR BLD AUTO: 0.6 %
BUN SERPL-MCNC: 24 MG/DL (ref 7–30)
BUN SERPL-MCNC: 24 MG/DL (ref 7–30)
CALCIUM SERPL-MCNC: 8.9 MG/DL (ref 8.5–10.1)
CALCIUM SERPL-MCNC: 9.1 MG/DL (ref 8.5–10.1)
CHLORIDE SERPL-SCNC: 94 MMOL/L (ref 94–109)
CHLORIDE SERPL-SCNC: 95 MMOL/L (ref 94–109)
CO2 SERPL-SCNC: 26 MMOL/L (ref 20–32)
CO2 SERPL-SCNC: 28 MMOL/L (ref 20–32)
CREAT SERPL-MCNC: 0.6 MG/DL (ref 0.52–1.04)
CREAT SERPL-MCNC: 0.69 MG/DL (ref 0.52–1.04)
DIFFERENTIAL METHOD BLD: ABNORMAL
EOSINOPHIL # BLD AUTO: 0 10E9/L (ref 0–0.7)
EOSINOPHIL NFR BLD AUTO: 1.2 %
ERYTHROCYTE [DISTWIDTH] IN BLOOD BY AUTOMATED COUNT: 28.2 % (ref 10–15)
GFR SERPL CREATININE-BSD FRML MDRD: 88 ML/MIN/1.7M2
GFR SERPL CREATININE-BSD FRML MDRD: ABNORMAL ML/MIN/1.7M2
GLUCOSE SERPL-MCNC: 116 MG/DL (ref 70–99)
GLUCOSE SERPL-MCNC: 75 MG/DL (ref 70–99)
HCT VFR BLD AUTO: 25 % (ref 35–47)
HGB BLD-MCNC: 7.9 G/DL (ref 11.7–15.7)
IMM GRANULOCYTES # BLD: 0 10E9/L (ref 0–0.4)
IMM GRANULOCYTES NFR BLD: 1.9 %
LACTATE BLD-SCNC: 7.9 MMOL/L (ref 0.7–2.1)
LDH SERPL L TO P-CCNC: 321 U/L (ref 81–234)
LYMPHOCYTES # BLD AUTO: 0.4 10E9/L (ref 0.8–5.3)
LYMPHOCYTES NFR BLD AUTO: 24.2 %
MAGNESIUM SERPL-MCNC: 1.5 MG/DL (ref 1.6–2.3)
MCH RBC QN AUTO: 31.5 PG (ref 26.5–33)
MCHC RBC AUTO-ENTMCNC: 31.6 G/DL (ref 31.5–36.5)
MCV RBC AUTO: 100 FL (ref 78–100)
MONOCYTES # BLD AUTO: 0.3 10E9/L (ref 0–1.3)
MONOCYTES NFR BLD AUTO: 19.9 %
NEUTROPHILS # BLD AUTO: 0.8 10E9/L (ref 1.6–8.3)
NEUTROPHILS NFR BLD AUTO: 52.2 %
NRBC # BLD AUTO: 0 10*3/UL
NRBC BLD AUTO-RTO: 0 /100
PHOSPHATE SERPL-MCNC: 2.7 MG/DL (ref 2.5–4.5)
PLATELET # BLD AUTO: 28 10E9/L (ref 150–450)
PLATELET # BLD EST: ABNORMAL 10*3/UL
POTASSIUM SERPL-SCNC: 3.5 MMOL/L (ref 3.4–5.3)
POTASSIUM SERPL-SCNC: 3.6 MMOL/L (ref 3.4–5.3)
RBC # BLD AUTO: 2.51 10E12/L (ref 3.8–5.2)
SODIUM SERPL-SCNC: 132 MMOL/L (ref 133–144)
SODIUM SERPL-SCNC: 133 MMOL/L (ref 133–144)
URATE SERPL-MCNC: 7.6 MG/DL (ref 2.6–6)
VIT B1 BLD-MCNC: 39 UG/DL
WBC # BLD AUTO: 1.6 10E9/L (ref 4–11)

## 2017-07-26 PROCEDURE — 25000125 ZZHC RX 250: Performed by: INTERNAL MEDICINE

## 2017-07-26 PROCEDURE — 25000132 ZZH RX MED GY IP 250 OP 250 PS 637: Performed by: INTERNAL MEDICINE

## 2017-07-26 PROCEDURE — 40000133 ZZH STATISTIC OT WARD VISIT

## 2017-07-26 PROCEDURE — 97110 THERAPEUTIC EXERCISES: CPT | Mod: GP | Performed by: PHYSICAL THERAPIST

## 2017-07-26 PROCEDURE — 80048 BASIC METABOLIC PNL TOTAL CA: CPT | Performed by: INTERNAL MEDICINE

## 2017-07-26 PROCEDURE — 80048 BASIC METABOLIC PNL TOTAL CA: CPT | Performed by: PHYSICIAN ASSISTANT

## 2017-07-26 PROCEDURE — 85025 COMPLETE CBC W/AUTO DIFF WBC: CPT | Performed by: INTERNAL MEDICINE

## 2017-07-26 PROCEDURE — 99213 OFFICE O/P EST LOW 20 MIN: CPT

## 2017-07-26 PROCEDURE — 36415 COLL VENOUS BLD VENIPUNCTURE: CPT | Performed by: PHYSICIAN ASSISTANT

## 2017-07-26 PROCEDURE — 83605 ASSAY OF LACTIC ACID: CPT | Performed by: INTERNAL MEDICINE

## 2017-07-26 PROCEDURE — 97535 SELF CARE MNGMENT TRAINING: CPT | Mod: GO

## 2017-07-26 PROCEDURE — 25000132 ZZH RX MED GY IP 250 OP 250 PS 637: Performed by: STUDENT IN AN ORGANIZED HEALTH CARE EDUCATION/TRAINING PROGRAM

## 2017-07-26 PROCEDURE — 36415 COLL VENOUS BLD VENIPUNCTURE: CPT | Performed by: INTERNAL MEDICINE

## 2017-07-26 PROCEDURE — 99231 SBSQ HOSP IP/OBS SF/LOW 25: CPT | Mod: GC | Performed by: INTERNAL MEDICINE

## 2017-07-26 PROCEDURE — 25000132 ZZH RX MED GY IP 250 OP 250 PS 637

## 2017-07-26 PROCEDURE — 21400006 ZZH R&B CCU INTERMEDIATE UMMC

## 2017-07-26 PROCEDURE — 25000132 ZZH RX MED GY IP 250 OP 250 PS 637: Performed by: NURSE PRACTITIONER

## 2017-07-26 PROCEDURE — 83615 LACTATE (LD) (LDH) ENZYME: CPT | Performed by: PHYSICIAN ASSISTANT

## 2017-07-26 PROCEDURE — 97116 GAIT TRAINING THERAPY: CPT | Mod: GP | Performed by: PHYSICAL THERAPIST

## 2017-07-26 PROCEDURE — 84100 ASSAY OF PHOSPHORUS: CPT | Performed by: PHYSICIAN ASSISTANT

## 2017-07-26 PROCEDURE — 83735 ASSAY OF MAGNESIUM: CPT | Performed by: PHYSICIAN ASSISTANT

## 2017-07-26 PROCEDURE — 40000193 ZZH STATISTIC PT WARD VISIT: Performed by: PHYSICAL THERAPIST

## 2017-07-26 PROCEDURE — 84550 ASSAY OF BLOOD/URIC ACID: CPT | Performed by: PHYSICIAN ASSISTANT

## 2017-07-26 PROCEDURE — 87340 HEPATITIS B SURFACE AG IA: CPT | Performed by: PHYSICIAN ASSISTANT

## 2017-07-26 PROCEDURE — 25000128 H RX IP 250 OP 636: Performed by: INTERNAL MEDICINE

## 2017-07-26 PROCEDURE — 86803 HEPATITIS C AB TEST: CPT | Performed by: PHYSICIAN ASSISTANT

## 2017-07-26 RX ORDER — THIAMINE HYDROCHLORIDE 100 MG/ML
100 INJECTION, SOLUTION INTRAMUSCULAR; INTRAVENOUS DAILY
Status: COMPLETED | OUTPATIENT
Start: 2017-07-26 | End: 2017-07-28

## 2017-07-26 RX ORDER — ATENOLOL 25 MG/1
25 TABLET ORAL ONCE
Status: COMPLETED | OUTPATIENT
Start: 2017-07-26 | End: 2017-07-26

## 2017-07-26 RX ADMIN — FOLIC ACID 1 MG: 1 TABLET ORAL at 08:34

## 2017-07-26 RX ADMIN — MIDODRINE HYDROCHLORIDE 10 MG: 5 TABLET ORAL at 18:15

## 2017-07-26 RX ADMIN — ACETAMINOPHEN 500 MG: 500 TABLET, FILM COATED ORAL at 14:12

## 2017-07-26 RX ADMIN — MICONAZOLE NITRATE: 2 POWDER TOPICAL at 19:36

## 2017-07-26 RX ADMIN — GABAPENTIN 200 MG: 100 CAPSULE ORAL at 21:11

## 2017-07-26 RX ADMIN — ACETAMINOPHEN 500 MG: 500 TABLET, FILM COATED ORAL at 02:05

## 2017-07-26 RX ADMIN — CALCIUM CARBONATE 600 MG (1,500 MG)-VITAMIN D3 400 UNIT TABLET 2 TABLET: at 08:30

## 2017-07-26 RX ADMIN — ATENOLOL 50 MG: 50 TABLET ORAL at 21:11

## 2017-07-26 RX ADMIN — ACETAMINOPHEN 500 MG: 500 TABLET, FILM COATED ORAL at 19:36

## 2017-07-26 RX ADMIN — THIAMINE HYDROCHLORIDE 100 MG: 100 INJECTION, SOLUTION INTRAMUSCULAR; INTRAVENOUS at 15:58

## 2017-07-26 RX ADMIN — ATENOLOL 25 MG: 25 TABLET ORAL at 15:57

## 2017-07-26 RX ADMIN — GABAPENTIN 200 MG: 100 CAPSULE ORAL at 14:12

## 2017-07-26 RX ADMIN — THIAMINE HCL (VITAMIN B1) 50 MG TABLET 50 MG: at 08:31

## 2017-07-26 RX ADMIN — ATENOLOL 50 MG: 50 TABLET ORAL at 08:32

## 2017-07-26 RX ADMIN — MICONAZOLE NITRATE: 2 POWDER TOPICAL at 08:36

## 2017-07-26 RX ADMIN — PREDNISONE 5 MG: 5 TABLET ORAL at 08:30

## 2017-07-26 RX ADMIN — POTASSIUM CHLORIDE 20 MEQ: 750 TABLET, EXTENDED RELEASE ORAL at 08:40

## 2017-07-26 RX ADMIN — MIDODRINE HYDROCHLORIDE 10 MG: 5 TABLET ORAL at 08:34

## 2017-07-26 RX ADMIN — MIDODRINE HYDROCHLORIDE 10 MG: 5 TABLET ORAL at 12:23

## 2017-07-26 RX ADMIN — TORSEMIDE 40 MG: 20 TABLET ORAL at 15:57

## 2017-07-26 RX ADMIN — TORSEMIDE 40 MG: 20 TABLET ORAL at 08:33

## 2017-07-26 RX ADMIN — MULTIPLE VITAMINS W/ MINERALS TAB 1 TABLET: TAB at 08:33

## 2017-07-26 RX ADMIN — POTASSIUM CHLORIDE 20 MEQ: 750 TABLET, EXTENDED RELEASE ORAL at 19:36

## 2017-07-26 RX ADMIN — DIGOXIN 125 MCG: 125 TABLET ORAL at 08:32

## 2017-07-26 RX ADMIN — PANTOPRAZOLE SODIUM 40 MG: 40 TABLET, DELAYED RELEASE ORAL at 06:20

## 2017-07-26 RX ADMIN — ACETAMINOPHEN 500 MG: 500 TABLET, FILM COATED ORAL at 08:32

## 2017-07-26 RX ADMIN — MAGNESIUM SULFATE IN WATER 4 G: 40 INJECTION, SOLUTION INTRAVENOUS at 19:38

## 2017-07-26 RX ADMIN — GABAPENTIN 200 MG: 100 CAPSULE ORAL at 08:31

## 2017-07-26 NOTE — PROGRESS NOTES
Social Work Services Progress Note *7/21/17    Hospital Day: 38  Date of Initial Social Work Evaluation:  6/20/17  Collaborated with:  Christin Laguerre, RNCC, bedside RN    Data:  Pt is a 56 year old female being followed by SW for needs post new diagnosis of lymphoma.    Intervention:  No needs identified today.  Pt and spouse aware of how to contact SW if needs arise.  Pt open to health psychology support for new adjustment to illness counseling at previous meeting.  Waiting on liver biopsy results for next stage of treatment.    Assessment:  Spouse Romeo involved and coping well at this time.  Pt also coping well per nursing.  No changes reported by Christin Laguerre team.    Plan:    Anticipated Disposition:  Facility:  Return to  ARU as able.    Barriers to d/c plan:  Medical stability, results of liver biopsy and chemotherapy treatment regimen.    Follow Up:   follow for support of pt and spouse.    DENNIS Larsen, APSW  6C Unit   Phone: [phone]  Pager: [phone]  Unit: [phone]      ___________________________________________________________________________________________________________________________________________________    Referrals in process:   [facility] - return when medically stable.    Consider TCU pending pt's chemotherapy needs.    Referrals Discontinued:  None    Community Case Management/Community Services in place:   None

## 2017-07-26 NOTE — PROGRESS NOTES
[facility] Nurse Inpatient Wound Assessment     Re Assessment of wound(s) on pt's:   Left arm    Data:     Patient History:      per MD note(s):  56 year old female with PMHx of VSD s/p repair (), RA, recently diagnosed atrial fibrillation that was complicated with an OOH cardiac arrest (felt 2/2 long pause with degeneration to VFib while converting Afib to SR with multiple AV prieto agents) who was readmitted from ARU on  with fever and concern for worsening LUE infection. She has had a complicated medical course, and is now on the cardiology service as primary since  for further workup and evaluation of recurrent lactic acidosis, persistent afib and softer blood pressures with tachypnea. Now with concern for malignancy; likely Bcell lymphoma.     left arm extravasation site    Moisture Management:  Dressings    Current Diet / Nutrition:         Active Diet Order      2 Gram Sodium Diet        Jay Score  Av.3  Min: 9  Max: 23     Labs  Recent Labs   Lab Test  17   0710   17   0650   17   1000   17   0746   17   1018   ALBUMIN   --    --   2.5*   --    --    < >  2.5*   < >  1.9*   HGB  7.9*   < >  8.4*   < >  9.6*   < >  7.8*   < >  8.3*   INR   --    --    --    --   1.41*   < >   --    < >  3.38*   WBC  1.6*   < >  2.3*   < >  3.2*   < >  2.2*   < >  7.5   A1C   --    --    --    --    --    --    --    --   Canceled, Test credited   UNABLE TO CALCULATE % HBA1C DUE TO LOW HGB AND/OR LOW HGBA1C  NOTIFIED CHELSEA BEE RN AT 1253 ON 17 [facility]     CRP   --    --    --    --    --    --   83.0*   < >   --     < > = values in this interval not displayed.          Wound Assessment :   Left arm  Wound History:  Extravasation wound with large area of epidermal loss                              17  Unable to take picture today due to camera malfunction.      Wound Base: 5% adherent tan  slough/necrosis 85% yellow slough and 5% granulation.      Specific Dimensions (length x width x depth, in cm) :   Total area 9.5 x 2.5, 3 separate wounds now with healed scar tissue between:     0.5 x 0.5 x 0.3, 2.5 x 1.7 x 0.8 and 2.2 x 1.2 x 0.6    Palpation of the wound bed:   Boggy     Periwound Skin: intact, minimal pink erythema     Drainage:  .Amount: moderate . Color: thick yellow liquefying slough and thin yellow serous     Odor: none  Pain:  Moderate, per patient is attributing increased pain to the cleaning of wound bed.        Intervention:     Patient's chart evaluated.      Wound(s) was assessed    Wound Care: was done: changed dressing, Visual inspection    Orders reviewed  Discussed plan of care with  Patient, family and nurse, and answered questions regarding wound healing process      Assessment:       Large area of epidermal loss with potential full thickness skin loss at area of necrosis 2/2 extravastion.  Measurements improvewd and slough is loosening well with use of Medihoney.  Pain level has increased only slightly with use of Medihoney   Recommend hand therapy soon to avoid contracture        Plan:     Nursing to notify the Provider(s) and re-consult the [facility] Nurse if wound(s) deteriorate(s).    Plan of care for wound located on left arm: Change entire dressing daily, clean wound and skin around wound with Microklenz.     Aply IN THIS ORDER:    -Apply Cavilon no-sting to periwound skin.    -Apply Morphine gel to wound edges.    -Apply Medihoney nickel thick to the areas with slough.      -Cover with Mepilex border dressings.      [facility] Nurse will return: weekly     Face to face time: 35 minutes

## 2017-07-26 NOTE — PROVIDER NOTIFICATION
MD notified regarding sepsis protocol, lactic acid 7.9. Rapid response called. VSS.  B/P: 104/67, T: 98.3, P: 67, R: 18, O2: 97% on 2L

## 2017-07-26 NOTE — PROGRESS NOTES
Cardiology Progress Note    Overnight/subj:   No evens overnight     VS reviewed: Vitals reviewed and stable   Tele: affib up 18 beats, 4 beats of VT    Wt Readings from Last 2 Encounters:   07/25/17 71 kg (156 lb 8 oz)   06/19/17 75.7 kg (166 lb 12.8 oz)       Intake/Output Summary (Last 24 hours) at 07/25/17 2108  Last data filed at 07/25/17 1600   Gross per 24 hour   Intake              600 ml   Output               50 ml   Net              550 ml       PO Cardiac Meds: Atenolol 50mg BID, digozin 0.125mcg daily, midodrine 10mg tid, lasix 40mg iv    Radiology Imaging  No new studies  Cardiology Studies:  CMR pending    Labs: Reviewed in epic    Phys exam:  GEN: NAD  Pulm: ctab  Cardiac: , regular bradycardia murmurs  Vascular: +2 HERNANDO and +2 pulses  GI: soft, non distended    ASSESSMENT/PLAN:  55yo F with hx VSD s/p repair, RA, pAF/flutter/SVT c/b OOH arrest with ongoing anasarca and FUO most likely 2/2 to diffuse B cell lymphoma base PET and marrow biopsy.      D/c iv lasix, start torsemide 40mg bid po  Awaiting liver bx, results  No change in other meds  Dispo; likely tx to heme/oc pending bx      Pt seen and discuss with Dr. Gandara.      Rick Nguyễn MD  Cardiology Fellow   [phone]      I interviewed and examined the patient with the house staff.  I agree with the assessment and plan as documented.      Selvin Gandara MD, PhD  Professor of Medicine  Division of Cardiology

## 2017-07-26 NOTE — PROGRESS NOTES
Cardiology Daily Note     Patient: Amelia Michel  MRN: [id number]  Admission Date: 6/19/2017  Hospital Day # 37       Transfer Summary  Amelia Michel is a 56 y.o. Female with a history of atrial fibrillation initially diagnosed in 2017, VSD s/p remote repair, RA on chronic MTX, prednisone, and Plaquenil. Patient was 1st admitted from 5/29-6/15/17 for an out of hospital arrest s/p defibrillation and achievement of ROSC s/p therapeutic hypothermia. The etiology of her arrest is most likely a long pause with degeneration to vfib while converting afib to sinus rhythm with multiple AV prieto agents. Coronaries on 5/29/17 were normal.    During her hospitalization, the patient has been intermittently febrile with persistently elevated lactate; howevever, a source of the infection was never elucidated. A BRE on 7/14/17 showed vegetations on the coronary cusps; however, ID did not believe this to infectious in etiology. Therefore, while she was initially on a variety of antibiotics (meropenem, bactrim, vancomycin); however, she has been off antibiotics since 7/5/17 without clinical deterioration.     In regards to her cardiac course. Her A-fib with RVR was initially difficult to control. She is now well controlled on a regimen of atenolol and digoxin. She is still quite hypervolemic on exam, with a significany amount of anasarca and third spacing. Diuresis was initially difficult, so midodrine was added to increase renal perfusion, she remains on this. She is being actively diuresed.     During her clinical course she has been pancytopenic. During investigation of this, she underwent bone marrow biopsy on 7/12/17 that returned positive with polytypic Bcells with suspicion for Bcell lymphoma. Follow up PET scan was suggestive of diffuse Bcell lymphoma. Finally, liver biopsy pathology results form 7/21/17 were suggestive of diffuse large Bcell lymphoma. At this time, it is appropriate to transfer patient's care to  Hem/Onc service for further treatment.      Today's Plans  -Diurese: furosemide 40mg IV BID  -Additional 25mg atenolol given    Fever of unknown origin  Recurrent Sepsis Protocol  Lactic Acidosis  Concern for Malignancy, likely B-cell Lymphoma  Previously attributed to LUE wound, though wound has continued to improve without surrounding appearance of cellulitis. Discontinued antibiotics with close monitoring. Summary of work-up -- no DVT on ultrasound 7/5/17, MRI of complete spine without nidus of infection 7/6/17, BRE with vegetations raised possibility of endocarditis, but only 1 major/2 minor Duke's criteria, does not have definitively. Serologies for coxiella and bartonella are negative. Bone marrow biopsy is concerning for B-cell lymphoma (see Hem/Onc note for full detail). - PET-Scan findings consistent with extensive lymphoma. Underlying lymphoma is likely explanation for lactic acidosis.  - Patient neutropenic, not to degree to warrant antibiotics for fever at present  - Heme/onc following, appreciate recs   - Peripheral flow cytometry with polytypic Bcells   - RBC folate normal  - Follow up results of Liver Biopsy when available  - Thiamine; as lymphoma can deplete thiamine stores, shunting metabolism to lactate production    Volume overload/anasarca  HFrEF (EF 50-55%)  Patient initially up 35# at admission; diuresis challenging, secondary to third spacing of fluid (albumin 1.9), but responded with addition of midodrine and ongoing albumin resuscitation. Right heart cath 6/26 with mildly elevated right and left sided filling pressures.  -Plan for Cardiac MRI w/ contrast to assess for infiltrative cardiac disease  - Scheduled 40mg IV lasix BID; monitor BMP  - Continue midodrine to 10 mg TID     Aortic Masses  BRE on 7/14 showed 3 small masses on the coronary cusps and LVOT. Do not believe this to be IE, does not meet Duke's criteria. ID is following; no antibiotics at this time. Unclear what this represents;  possibly could be Libmann-Sacks.  -Continue to monitor  -May plan for follow up Echo at a later time    CELSA, improved  Likely prerenal with combination of diuresis and NSAID use for FUO. Also consider contrast injury given CT chest.   - Avoid nephrotoxins    Atrial fibrillation/flutter with RVR  RVR in setting of volume overload above. Patient rec'd single dose of diltiazem with associated soft BPs.   - Continue dig 125mcg  - Atenolol 50 mg BID    - Not anticoagulated due to thrombocytopenia    Out of hospital arrest  Pt will need to wear LifeVest until medically appropriate and cleared from infection/wound standpoint for ICD implantation likely 4-6 weeks after discharge per EP (pending hospital course for LUE wound).       Acute on Chronic Thrombocytopenia   Plts noted to decrease from 104 to as low as 15 during recent hospitalization requiring transfusion w/ chronically low in the 's as outpatient. Possible bactrim was offending agent for exacerbation, and discontinued this medication (exchanged for meropenem in the setting of worsening cellulitis). 2 units platelets given on 7/22 pre-proceduraly.   - Continue to hold RA meds for now  - Holding apixiban (last dose 7/8/17)    Acute on chronic anemia: Stable  No evidence of overt bleeding. Hgb 6.6 on 7/16/17 likely dilutional with all lines down and ongoing resuscitation with albumin, NS.   - Daily CBC  - 1 U RBCs 7/16/17    RA  History of seropositive rheumatoid arthritis (anti-CCP positive) since late 1980;s w/ multiple MTP osteotomies, partial right wrist fusion, longstanding therapy consisting of MTX, prednisone and HCQ  - Tapered to 5 mg prednisone on 7/17 with continued monitoring for flaring  - Rheumatology following  - Continue to hold HCQ and MTX until outpatient follow-up  - Follow-up with [facility]ology clinic Dr. Conor Cunha in 4-6 weeks after discharge      Severe Critical Illness Myopathy   Significant deconditioning w/ median neuropathies  "most likely localized to the wrists and ulnar neuropathies most likely localized to the elbows secondary to RA.  - Ongoing extensive PT/OT/SLP    Consulting Services: Nutrition, heme/onc, ID, rheumatology    CODE: Full Code  DVT Ppx: being held due to thrombocytopenia  Diet/Fluids: 2g Na limit  Disposition: Transfer to Hem/Onc pending tissue diagnosis    Pt's care was discussed with bedside RN and patient during Care Team Rounds.    Patient was discussed and evaluated with Dr. Gandara.    Mike Alicia MD  Internal Medicine Resident, PGY2  [phone]  _______________________________________________________________    Subjective & Interval History:   -nursing notes reviewed  -chart reviewed  -Evening dose of atenolol held on 7/25; patient subsequently went into Afib with RVR in the AM; hemodynamically stable  -no fevers, chills, SOB more than normal  -Continues to work with PT/OT  -Triggered sepsis protocol again on 7/26    Medications: Reviewed in EPIC. List below for reference    Physical Exam:    Blood pressure 109/88, pulse 67, temperature 99.6  F (37.6  C), temperature source Oral, resp. rate 18, height 1.48 m (4' 10.25\"), weight 67.7 kg (149 lb 3.2 oz), SpO2 97 %, not currently breastfeeding.    General: Lying in bed sleeping soundly. No acute distress. Appears well.   Resp: Diminished breath sounds posteriorly; clear anteriorly  CV: Regular rate and rhythm. Crisp S1 and S2. No murmurs, rubs nor gallops   Abdominal: Soft, nondistended. Nontender to palpation. No peritoneal signs. BS+  MSK: 2+ LE edema, 2+ sacral edema as well. Wraps on legs.  Neurological: CN's II-XII grossly intact. Alert and oriented completely.    Labs & Studies of Note: Reviewed in Epic    PET-Scan  IMPRESSION: In this patient with history of abnormal bone marrow  biopsy and clinical suspicion of lymphoma;      1. Findings suggesting extensive lymphoma including;    a. Multiple hypermetabolic mediastinal and single left neck level " 2A  lymphadenopathy.    b. Extensive FDG avid bony lesions throughout the axial and  appendicular skeleton. No bone fracture or spinal canal involvement.     c. Numerous hepatic FDG avid lesions.    d. Findings suspicious for cardiac involvement as evidenced by  thickening of the interatrial septum with associated increased FDG  uptake. This can be further evaluated with echocardiogram.    e. FDG avid lesion in the pelvis contiguous with the uterus,  suspicious for uterine involvement.  2. Stable bilateral pleural effusions with associated compression  atelectasis.  3. Mild to moderate ascites. Diffuse soft tissue anasarca.  4. Splenomegaly without associated FDG uptake.    CMP    Recent Labs  Lab 07/26/17  0710 07/25/17  0734 07/24/17  0653 07/23/17  1618 07/23/17  0650 07/22/17  0754   * 135 134  --  134 135   POTASSIUM 3.5 4.0 3.7 3.8 3.3* 3.9   CHLORIDE 95 99 96  --  98 98   CO2 26 26 22  --  26 27   ANIONGAP 11 10 15*  --  10 10   GLC 75 71 86  --  86 102*   BUN 24 23 27  --  25 31*   CR 0.69 0.61 0.71  --  0.52 0.72   GFRESTIMATED 88 >90Non  GFR Calc 85  --  >90Non  GFR Calc 83   GFRESTBLACK >90African American GFR Calc >90African American GFR Calc >90African American GFR Calc  --  >90African American GFR Calc >90African American GFR Calc   VIKTORIYA 8.9 8.9 8.6  --  8.6 8.4*   MAG  --   --  2.0  --  2.0 1.7   PHOS  --   --   --   --  2.8 2.6   PROTTOTAL  --   --   --   --  5.4*  --    ALBUMIN  --   --   --   --  2.5*  --    BILITOTAL  --   --   --   --  1.2  --    ALKPHOS  --   --   --   --  202*  --    AST  --   --   --   --  50*  --    ALT  --   --   --   --  33  --        CBC    Recent Labs  Lab 07/26/17  0710 07/25/17  0734 07/24/17  0653 07/23/17  0650   WBC 1.6* 2.0* 2.4* 2.3*   RBC 2.51* 2.47* 2.41* 2.68*   HGB 7.9* 7.7* 7.6* 8.4*   HCT 25.0* 24.4* 23.6* 26.4*   PLT 28* 27* 30* 37*     INR    Recent Labs  Lab 07/21/17  1000   INR 1.41*     Medication List for Reference  (delete if desired)  Current Facility-Administered Medications   Medication     atenolol (TENORMIN) tablet 25 mg     thiamine (B-1) injection 100 mg     torsemide (DEMADEX) tablet 40 mg     acetaminophen (TYLENOL) tablet 500 mg     LORazepam (ATIVAN) tablet 1 mg     traZODone (DESYREL) quarter-tab 12.5 mg     morphine 0.1% in solosite topical gel 2 g     sodium chloride (PF) 0.9% PF flush 1-74 mL     acetaminophen (TYLENOL) tablet 650 mg     predniSONE (DELTASONE) tablet 5 mg     thiamine tablet 50 mg     folic acid (FOLVITE) tablet 1 mg     atenolol (TENORMIN) tablet 50 mg     midodrine (PROAMATINE) tablet 10 mg     miconazole (MICATIN; MICRO GUARD) 2 % powder     pantoprazole (PROTONIX) EC tablet 40 mg     digoxin (LANOXIN) tablet 125 mcg     ondansetron (ZOFRAN) tablet 4 mg     prochlorperazine (COMPAZINE) injection 5-10 mg     ondansetron (ZOFRAN) injection 4 mg     simethicone (MYLICON) chewable tablet 80 mg     sodium chloride (PF) 0.9% PF flush 10-20 mL     sodium chloride (PF) 0.9% PF flush 10 mL     heparin lock flush 10 UNIT/ML injection 5-10 mL     dextrose 10 % 1,000 mL infusion     gabapentin (NEURONTIN) capsule 200 mg     melatonin tablet 3 mg     multivitamin, therapeutic with minerals (THERA-VIT-M) tablet 1 tablet     lidocaine 1 % 1 mL     lidocaine (LMX4) kit     sodium chloride (PF) 0.9% PF flush 3 mL     medication instruction     acetaminophen (TYLENOL) Suppository 650 mg     Patient is already receiving anticoagulation with heparin, enoxaparin (LOVENOX), warfarin (COUMADIN)  or other anticoagulant medication     glucose 40 % gel 15-30 g    Or     dextrose 50 % injection 25-50 mL    Or     glucagon injection 1 mg     naloxone (NARCAN) injection 0.1-0.4 mg     calcium-vitamin D (CALTRATE) 600-400 MG-UNIT per tablet 2 tablet     morphine 0.1% in solosite topical gel     potassium chloride SA (K-DUR/KLOR-CON M) CR tablet 20-40 mEq     potassium chloride (KLOR-CON) Packet 20-40 mEq     potassium  chloride 10 mEq in 100 mL intermittent infusion     potassium chloride 10 mEq in 100 mL intermittent infusion with 10 mg lidocaine     potassium chloride 20 mEq in 50 mL intermittent infusion     magnesium sulfate 2 g in NS intermittent infusion ([facility] or [facility] Cmpd)     magnesium sulfate 4 g in 100 mL sterile water (premade)     sodium phosphate 10 mmol in D5W intermittent infusion     sodium phosphate 15 mmol in D5W intermittent infusion     sodium phosphate 20 mmol in D5W intermittent infusion     sodium phosphate 25 mmol in D5W intermittent infusion     melatonin tablet 1 mg     traMADol (ULTRAM) half-tab 25 mg     I interviewed and examined the patient with the house staff.  I agree with the assessment and plan as documented.  Selvin Gandara MD, PhD  Professor of Medicine  Division of Cardiology

## 2017-07-26 NOTE — PLAN OF CARE
Problem: Goal Outcome Summary  Goal: Goal Outcome Summary  1. Pt will have fevers well controlled  2. Pt will be hemodynamically stable   Outcome: No Change  2790-6317: A&O, VSS on 1 L O2 NC. Monitor reading SR-SB, pt had a run of 22 beats of afib at 0500 this morning. Denied pain and nausea and slept well between cares. LUE dressing CDI. Incontinent of large amounts of urine every 2-3 hrs. 1 formed, medium, BM this shift. Up with SBA. PIV patent and SL'ed. On 2 G NA diet. Plan for transfer to hem/onc service. Continue to monitor and follow POC.

## 2017-07-26 NOTE — PROGRESS NOTES
Social Work Services Progress Note *7/24/17    Hospital Day: 38  Date of Initial Social Work Evaluation:  6/20/17  Collaborated with:  Pt and Spouse    Data:  Pt is a 56 year old female being followed by SW for needs post new diagnosis of lymphoma.    Intervention:  Spouse states that he has paperwork for SW and medical team given pt's new dx.  Will get this from spouse when he comes in tomorrow.    Assessment:  No changes, per nursing pt has been showing motivation to get stronger for treatment.    Plan:    Anticipated Disposition:  Facility:  Return to  ARU as able.    Barriers to d/c plan:  Medical stability, results of liver biopsy and chemotherapy treatment regimen.    Follow Up:  SW follow for support of pt and spouse.    DENNIS Larsen, APSW  6C Unit   Phone: [phone]  Pager: [phone]  Unit: [phone]      ___________________________________________________________________________________________________________________________________________________    Referrals in process:   [facility] - return when medically stable.    Consider TCU pending pt's chemotherapy needs.    Referrals Discontinued:  None    Community Case Management/Community Services in place:   None

## 2017-07-26 NOTE — PLAN OF CARE
Problem: Goal Outcome Summary  Goal: Goal Outcome Summary  1. Pt will have fevers well controlled  2. Pt will be hemodynamically stable   Outcome: No Change  VSS. Running a-fib/a-flutter. HR up to 120-150 BPM. MD's aware. On 1L of O2. No c/o pain. INC B&B. Potassium replaced. Dressing to LUE changed by WOC nurse. Up with SBA. Plans to transfer to heme/onc services.  at bedside for most of shift. Continue with POC.

## 2017-07-26 NOTE — PLAN OF CARE
Pt converted to Afib/flutter this AM, team notified, waiting for extra dose of atenolol this afternoon. Hem/onc team came to talk to pt, plan on transitioning to that team tomorrow, discussed keeping pt on tele until ready for chemo. Charge RN notified and updated. Triggered sepsis protocol at shift change. Will continue to monitor and notify team of any questions or concerns.

## 2017-07-26 NOTE — PLAN OF CARE
Problem: Goal Outcome Summary  Goal: Goal Outcome Summary  1. Pt will have fevers well controlled  2. Pt will be hemodynamically stable   PT/CR/6C     Pt ambulated 350' w/ w/c follow, seated rest break x2 using FWW and SBA. Pt sit<>stand IND. Pt experienced dizziness w/ head turns during gait. Pt performed step ups to aerobic stair (4in) in room total of 7 step ups using FWW and CGA. Pt on 2 LPM during activity.     Recommend discharge to ARU in order to address endurance, gait, strength, and functional independence. Pt continues to be limited by fatigue and changing O2 needs.

## 2017-07-27 ENCOUNTER — APPOINTMENT (OUTPATIENT)
Dept: GENERAL RADIOLOGY | Facility: CLINIC | Age: 57
DRG: 871 | End: 2017-07-27
Attending: INTERNAL MEDICINE
Payer: COMMERCIAL

## 2017-07-27 ENCOUNTER — APPOINTMENT (OUTPATIENT)
Dept: PHYSICAL THERAPY | Facility: CLINIC | Age: 57
DRG: 871 | End: 2017-07-27
Attending: INTERNAL MEDICINE
Payer: COMMERCIAL

## 2017-07-27 PROBLEM — C83.398 DIFFUSE LARGE B-CELL LYMPHOMA OF EXTRANODAL SITE: Status: ACTIVE | Noted: 2017-07-27

## 2017-07-27 LAB
ALBUMIN SERPL ELPH-MCNC: 3.1 G/DL (ref 3.7–5.1)
ALPHA1 GLOB SERPL ELPH-MCNC: 0.4 G/DL (ref 0.2–0.4)
ALPHA2 GLOB SERPL ELPH-MCNC: 0.5 G/DL (ref 0.5–0.9)
ANION GAP SERPL CALCULATED.3IONS-SCNC: 14 MMOL/L (ref 3–14)
ANION GAP SERPL CALCULATED.3IONS-SCNC: 15 MMOL/L (ref 3–14)
APTT PPP: 35 SEC (ref 22–37)
APTT PPP: 37 SEC (ref 22–37)
B-GLOBULIN SERPL ELPH-MCNC: 0.5 G/DL (ref 0.6–1)
BASOPHILS # BLD AUTO: 0 10E9/L (ref 0–0.2)
BASOPHILS # BLD AUTO: 0 10E9/L (ref 0–0.2)
BASOPHILS NFR BLD AUTO: 0.4 %
BASOPHILS NFR BLD AUTO: 0.5 %
BLD PROD TYP BPU: NORMAL
BUN SERPL-MCNC: 29 MG/DL (ref 7–30)
BUN SERPL-MCNC: 31 MG/DL (ref 7–30)
CALCIUM SERPL-MCNC: 8.4 MG/DL (ref 8.5–10.1)
CALCIUM SERPL-MCNC: 8.9 MG/DL (ref 8.5–10.1)
CHLORIDE SERPL-SCNC: 90 MMOL/L (ref 94–109)
CHLORIDE SERPL-SCNC: 92 MMOL/L (ref 94–109)
CO2 SERPL-SCNC: 27 MMOL/L (ref 20–32)
CO2 SERPL-SCNC: 27 MMOL/L (ref 20–32)
COPATH REPORT: NORMAL
CREAT SERPL-MCNC: 0.65 MG/DL (ref 0.52–1.04)
CREAT SERPL-MCNC: 0.7 MG/DL (ref 0.52–1.04)
DIFFERENTIAL METHOD BLD: ABNORMAL
DIFFERENTIAL METHOD BLD: ABNORMAL
EOSINOPHIL # BLD AUTO: 0 10E9/L (ref 0–0.7)
EOSINOPHIL # BLD AUTO: 0 10E9/L (ref 0–0.7)
EOSINOPHIL NFR BLD AUTO: 0.4 %
EOSINOPHIL NFR BLD AUTO: 0.5 %
ERYTHROCYTE [DISTWIDTH] IN BLOOD BY AUTOMATED COUNT: 28 % (ref 10–15)
ERYTHROCYTE [DISTWIDTH] IN BLOOD BY AUTOMATED COUNT: 28.1 % (ref 10–15)
FIBRINOGEN PPP-MCNC: 296 MG/DL (ref 200–420)
FIBRINOGEN PPP-MCNC: 303 MG/DL (ref 200–420)
GAMMA GLOB SERPL ELPH-MCNC: 1.1 G/DL (ref 0.7–1.6)
GFR SERPL CREATININE-BSD FRML MDRD: 86 ML/MIN/1.7M2
GFR SERPL CREATININE-BSD FRML MDRD: ABNORMAL ML/MIN/1.7M2
GLUCOSE SERPL-MCNC: 128 MG/DL (ref 70–99)
GLUCOSE SERPL-MCNC: 77 MG/DL (ref 70–99)
HBV SURFACE AG SERPL QL IA: NONREACTIVE
HCT VFR BLD AUTO: 22.9 % (ref 35–47)
HCT VFR BLD AUTO: 25.4 % (ref 35–47)
HCV AB SERPL QL IA: NORMAL
HGB BLD-MCNC: 7.4 G/DL (ref 11.7–15.7)
HGB BLD-MCNC: 8.1 G/DL (ref 11.7–15.7)
IGA SERPL-MCNC: 146 MG/DL (ref 70–380)
IGG SERPL-MCNC: 1370 MG/DL (ref 695–1620)
IGM SERPL-MCNC: 19 MG/DL (ref 60–265)
IMM GRANULOCYTES # BLD: 0 10E9/L (ref 0–0.4)
IMM GRANULOCYTES # BLD: 0 10E9/L (ref 0–0.4)
IMM GRANULOCYTES NFR BLD: 0.4 %
IMM GRANULOCYTES NFR BLD: 1 %
INR PPP: 1.53 (ref 0.86–1.14)
INR PPP: 1.57 (ref 0.86–1.14)
LDH SERPL L TO P-CCNC: 302 U/L (ref 81–234)
LDH SERPL L TO P-CCNC: 306 U/L (ref 81–234)
LYMPHOCYTES # BLD AUTO: 0.5 10E9/L (ref 0.8–5.3)
LYMPHOCYTES # BLD AUTO: 0.5 10E9/L (ref 0.8–5.3)
LYMPHOCYTES NFR BLD AUTO: 21.6 %
LYMPHOCYTES NFR BLD AUTO: 22.7 %
M PROTEIN SERPL ELPH-MCNC: 0 G/DL
MAGNESIUM SERPL-MCNC: 2.4 MG/DL (ref 1.6–2.3)
MCH RBC QN AUTO: 31.2 PG (ref 26.5–33)
MCH RBC QN AUTO: 31.6 PG (ref 26.5–33)
MCHC RBC AUTO-ENTMCNC: 31.9 G/DL (ref 31.5–36.5)
MCHC RBC AUTO-ENTMCNC: 32.3 G/DL (ref 31.5–36.5)
MCV RBC AUTO: 98 FL (ref 78–100)
MCV RBC AUTO: 98 FL (ref 78–100)
MONOCYTES # BLD AUTO: 0.5 10E9/L (ref 0–1.3)
MONOCYTES # BLD AUTO: 0.6 10E9/L (ref 0–1.3)
MONOCYTES NFR BLD AUTO: 24.6 %
MONOCYTES NFR BLD AUTO: 25 %
NEUTROPHILS # BLD AUTO: 1 10E9/L (ref 1.6–8.3)
NEUTROPHILS # BLD AUTO: 1.2 10E9/L (ref 1.6–8.3)
NEUTROPHILS NFR BLD AUTO: 50.7 %
NEUTROPHILS NFR BLD AUTO: 52.2 %
NRBC # BLD AUTO: 0 10*3/UL
NRBC # BLD AUTO: 0 10*3/UL
NRBC BLD AUTO-RTO: 0 /100
NRBC BLD AUTO-RTO: 0 /100
NUM BPU REQUESTED: 1
PHOSPHATE SERPL-MCNC: 2.5 MG/DL (ref 2.5–4.5)
PHOSPHATE SERPL-MCNC: 3.1 MG/DL (ref 2.5–4.5)
PLATELET # BLD AUTO: 28 10E9/L (ref 150–450)
PLATELET # BLD AUTO: 31 10E9/L (ref 150–450)
PLATELET # BLD EST: ABNORMAL 10*3/UL
PLATELET # BLD EST: ABNORMAL 10*3/UL
POTASSIUM SERPL-SCNC: 3.4 MMOL/L (ref 3.4–5.3)
POTASSIUM SERPL-SCNC: 3.6 MMOL/L (ref 3.4–5.3)
PROT PATTERN SERPL ELPH-IMP: ABNORMAL
PROT PATTERN SERPL IFE-IMP: ABNORMAL
RBC # BLD AUTO: 2.34 10E12/L (ref 3.8–5.2)
RBC # BLD AUTO: 2.6 10E12/L (ref 3.8–5.2)
RBC MORPH BLD: ABNORMAL
SODIUM SERPL-SCNC: 131 MMOL/L (ref 133–144)
SODIUM SERPL-SCNC: 133 MMOL/L (ref 133–144)
URATE SERPL-MCNC: 7.9 MG/DL (ref 2.6–6)
URATE SERPL-MCNC: 8.2 MG/DL (ref 2.6–6)
WBC # BLD AUTO: 2 10E9/L (ref 4–11)
WBC # BLD AUTO: 2.4 10E9/L (ref 4–11)

## 2017-07-27 PROCEDURE — 85610 PROTHROMBIN TIME: CPT | Performed by: PHYSICIAN ASSISTANT

## 2017-07-27 PROCEDURE — 36569 INSJ PICC 5 YR+ W/O IMAGING: CPT

## 2017-07-27 PROCEDURE — 83615 LACTATE (LD) (LDH) ENZYME: CPT | Performed by: INTERNAL MEDICINE

## 2017-07-27 PROCEDURE — 25000132 ZZH RX MED GY IP 250 OP 250 PS 637: Performed by: INTERNAL MEDICINE

## 2017-07-27 PROCEDURE — 25000132 ZZH RX MED GY IP 250 OP 250 PS 637

## 2017-07-27 PROCEDURE — 83735 ASSAY OF MAGNESIUM: CPT | Performed by: PHYSICIAN ASSISTANT

## 2017-07-27 PROCEDURE — 25000132 ZZH RX MED GY IP 250 OP 250 PS 637: Performed by: STUDENT IN AN ORGANIZED HEALTH CARE EDUCATION/TRAINING PROGRAM

## 2017-07-27 PROCEDURE — 82784 ASSAY IGA/IGD/IGG/IGM EACH: CPT | Performed by: PHYSICIAN ASSISTANT

## 2017-07-27 PROCEDURE — 25000125 ZZHC RX 250: Performed by: INTERNAL MEDICINE

## 2017-07-27 PROCEDURE — 84165 PROTEIN E-PHORESIS SERUM: CPT | Performed by: PHYSICIAN ASSISTANT

## 2017-07-27 PROCEDURE — 85610 PROTHROMBIN TIME: CPT | Performed by: INTERNAL MEDICINE

## 2017-07-27 PROCEDURE — 85384 FIBRINOGEN ACTIVITY: CPT | Performed by: INTERNAL MEDICINE

## 2017-07-27 PROCEDURE — 25000128 H RX IP 250 OP 636: Performed by: INTERNAL MEDICINE

## 2017-07-27 PROCEDURE — 00000402 ZZHCL STATISTIC TOTAL PROTEIN: Performed by: PHYSICIAN ASSISTANT

## 2017-07-27 PROCEDURE — 40000344 ZZHCL STATISTIC THAWING COMPONENT: Performed by: INTERNAL MEDICINE

## 2017-07-27 PROCEDURE — 83615 LACTATE (LD) (LDH) ENZYME: CPT | Performed by: PHYSICIAN ASSISTANT

## 2017-07-27 PROCEDURE — 85025 COMPLETE CBC W/AUTO DIFF WBC: CPT | Performed by: PHYSICIAN ASSISTANT

## 2017-07-27 PROCEDURE — 85730 THROMBOPLASTIN TIME PARTIAL: CPT | Performed by: PHYSICIAN ASSISTANT

## 2017-07-27 PROCEDURE — 84550 ASSAY OF BLOOD/URIC ACID: CPT | Performed by: PHYSICIAN ASSISTANT

## 2017-07-27 PROCEDURE — 85730 THROMBOPLASTIN TIME PARTIAL: CPT | Performed by: INTERNAL MEDICINE

## 2017-07-27 PROCEDURE — 86704 HEP B CORE ANTIBODY TOTAL: CPT | Performed by: PHYSICIAN ASSISTANT

## 2017-07-27 PROCEDURE — 27210208 ZZH KIT VALVED DOUBLE LUMEN

## 2017-07-27 PROCEDURE — 86334 IMMUNOFIX E-PHORESIS SERUM: CPT | Performed by: PHYSICIAN ASSISTANT

## 2017-07-27 PROCEDURE — 86901 BLOOD TYPING SEROLOGIC RH(D): CPT | Performed by: INTERNAL MEDICINE

## 2017-07-27 PROCEDURE — 84550 ASSAY OF BLOOD/URIC ACID: CPT | Performed by: INTERNAL MEDICINE

## 2017-07-27 PROCEDURE — 85025 COMPLETE CBC W/AUTO DIFF WBC: CPT | Performed by: INTERNAL MEDICINE

## 2017-07-27 PROCEDURE — 20000002 ZZH R&B BMT INTERMEDIATE

## 2017-07-27 PROCEDURE — 80048 BASIC METABOLIC PNL TOTAL CA: CPT | Performed by: PHYSICIAN ASSISTANT

## 2017-07-27 PROCEDURE — P9016 RBC LEUKOCYTES REDUCED: HCPCS | Performed by: INTERNAL MEDICINE

## 2017-07-27 PROCEDURE — 84100 ASSAY OF PHOSPHORUS: CPT | Performed by: PHYSICIAN ASSISTANT

## 2017-07-27 PROCEDURE — 86900 BLOOD TYPING SEROLOGIC ABO: CPT | Performed by: INTERNAL MEDICINE

## 2017-07-27 PROCEDURE — 25000132 ZZH RX MED GY IP 250 OP 250 PS 637: Performed by: NURSE PRACTITIONER

## 2017-07-27 PROCEDURE — 86850 RBC ANTIBODY SCREEN: CPT | Performed by: INTERNAL MEDICINE

## 2017-07-27 PROCEDURE — 97116 GAIT TRAINING THERAPY: CPT | Mod: GP

## 2017-07-27 PROCEDURE — P9059 PLASMA, FRZ BETWEEN 8-24HOUR: HCPCS | Performed by: INTERNAL MEDICINE

## 2017-07-27 PROCEDURE — 40000193 ZZH STATISTIC PT WARD VISIT

## 2017-07-27 PROCEDURE — 97530 THERAPEUTIC ACTIVITIES: CPT | Mod: GP

## 2017-07-27 PROCEDURE — 71010 XR CHEST PORT 1 VW: CPT

## 2017-07-27 PROCEDURE — 85384 FIBRINOGEN ACTIVITY: CPT | Performed by: PHYSICIAN ASSISTANT

## 2017-07-27 PROCEDURE — 84100 ASSAY OF PHOSPHORUS: CPT | Performed by: INTERNAL MEDICINE

## 2017-07-27 PROCEDURE — 97110 THERAPEUTIC EXERCISES: CPT | Mod: GP

## 2017-07-27 PROCEDURE — 36415 COLL VENOUS BLD VENIPUNCTURE: CPT | Performed by: PHYSICIAN ASSISTANT

## 2017-07-27 PROCEDURE — 86923 COMPATIBILITY TEST ELECTRIC: CPT | Performed by: INTERNAL MEDICINE

## 2017-07-27 RX ORDER — PROCHLORPERAZINE MALEATE 5 MG
10 TABLET ORAL EVERY 6 HOURS PRN
Status: DISCONTINUED | OUTPATIENT
Start: 2017-07-27 | End: 2017-08-04 | Stop reason: HOSPADM

## 2017-07-27 RX ORDER — SODIUM CHLORIDE 9 MG/ML
1000 INJECTION, SOLUTION INTRAVENOUS CONTINUOUS PRN
Status: DISCONTINUED | OUTPATIENT
Start: 2017-07-27 | End: 2017-08-04 | Stop reason: HOSPADM

## 2017-07-27 RX ORDER — AMOXICILLIN 250 MG
2 CAPSULE ORAL 2 TIMES DAILY
Status: DISCONTINUED | OUTPATIENT
Start: 2017-07-27 | End: 2017-07-28

## 2017-07-27 RX ORDER — ACETAMINOPHEN 325 MG/1
650 TABLET ORAL ONCE
Status: COMPLETED | OUTPATIENT
Start: 2017-07-28 | End: 2017-07-28

## 2017-07-27 RX ORDER — DIPHENHYDRAMINE HCL 50 MG
50 CAPSULE ORAL ONCE
Status: COMPLETED | OUTPATIENT
Start: 2017-07-28 | End: 2017-07-28

## 2017-07-27 RX ORDER — ALBUTEROL SULFATE 90 UG/1
1-2 AEROSOL, METERED RESPIRATORY (INHALATION)
Status: DISCONTINUED | OUTPATIENT
Start: 2017-07-27 | End: 2017-08-04 | Stop reason: HOSPADM

## 2017-07-27 RX ORDER — LEVOFLOXACIN 250 MG/1
250 TABLET, FILM COATED ORAL DAILY
Status: DISCONTINUED | OUTPATIENT
Start: 2017-07-27 | End: 2017-07-30

## 2017-07-27 RX ORDER — ONDANSETRON 8 MG/1
16 TABLET, FILM COATED ORAL EVERY 24 HOURS
Status: DISCONTINUED | OUTPATIENT
Start: 2017-07-28 | End: 2017-07-31

## 2017-07-27 RX ORDER — POTASSIUM CL/LIDO/0.9 % NACL 10MEQ/0.1L
10 INTRAVENOUS SOLUTION, PIGGYBACK (ML) INTRAVENOUS
Status: DISCONTINUED | OUTPATIENT
Start: 2017-07-27 | End: 2017-08-01

## 2017-07-27 RX ORDER — POTASSIUM CHLORIDE 750 MG/1
20-40 TABLET, EXTENDED RELEASE ORAL
Status: DISCONTINUED | OUTPATIENT
Start: 2017-07-27 | End: 2017-08-01

## 2017-07-27 RX ORDER — LORAZEPAM 2 MG/ML
.5-1 INJECTION INTRAMUSCULAR EVERY 6 HOURS PRN
Status: DISCONTINUED | OUTPATIENT
Start: 2017-07-27 | End: 2017-08-04 | Stop reason: HOSPADM

## 2017-07-27 RX ORDER — ALLOPURINOL 300 MG/1
300 TABLET ORAL DAILY
Status: DISCONTINUED | OUTPATIENT
Start: 2017-07-27 | End: 2017-08-04 | Stop reason: HOSPADM

## 2017-07-27 RX ORDER — POTASSIUM CHLORIDE 29.8 MG/ML
20 INJECTION INTRAVENOUS
Status: DISCONTINUED | OUTPATIENT
Start: 2017-07-27 | End: 2017-08-01

## 2017-07-27 RX ORDER — MEPERIDINE HYDROCHLORIDE 25 MG/ML
25 INJECTION INTRAMUSCULAR; INTRAVENOUS; SUBCUTANEOUS EVERY 30 MIN PRN
Status: DISCONTINUED | OUTPATIENT
Start: 2017-07-27 | End: 2017-08-04 | Stop reason: HOSPADM

## 2017-07-27 RX ORDER — DEXAMETHASONE 4 MG/1
8 TABLET ORAL DAILY
Status: COMPLETED | OUTPATIENT
Start: 2017-08-02 | End: 2017-08-03

## 2017-07-27 RX ORDER — DIPHENHYDRAMINE HYDROCHLORIDE 50 MG/ML
50 INJECTION INTRAMUSCULAR; INTRAVENOUS
Status: COMPLETED | OUTPATIENT
Start: 2017-07-27 | End: 2017-07-31

## 2017-07-27 RX ORDER — ALLOPURINOL 300 MG/1
300 TABLET ORAL DAILY
Status: DISCONTINUED | OUTPATIENT
Start: 2017-07-27 | End: 2017-07-27

## 2017-07-27 RX ORDER — POTASSIUM CHLORIDE 7.45 MG/ML
10 INJECTION INTRAVENOUS
Status: DISCONTINUED | OUTPATIENT
Start: 2017-07-27 | End: 2017-08-01

## 2017-07-27 RX ORDER — ALBUTEROL SULFATE 0.83 MG/ML
2.5 SOLUTION RESPIRATORY (INHALATION)
Status: DISCONTINUED | OUTPATIENT
Start: 2017-07-27 | End: 2017-08-04 | Stop reason: HOSPADM

## 2017-07-27 RX ORDER — ACYCLOVIR 400 MG/1
400 TABLET ORAL 2 TIMES DAILY
Status: DISCONTINUED | OUTPATIENT
Start: 2017-07-27 | End: 2017-08-04 | Stop reason: HOSPADM

## 2017-07-27 RX ORDER — LORAZEPAM 0.5 MG/1
.5-1 TABLET ORAL EVERY 6 HOURS PRN
Status: DISCONTINUED | OUTPATIENT
Start: 2017-07-27 | End: 2017-08-04 | Stop reason: HOSPADM

## 2017-07-27 RX ORDER — POTASSIUM CHLORIDE 1.5 G/1.58G
20-40 POWDER, FOR SOLUTION ORAL
Status: DISCONTINUED | OUTPATIENT
Start: 2017-07-27 | End: 2017-08-01

## 2017-07-27 RX ORDER — LIDOCAINE 40 MG/G
CREAM TOPICAL
Status: DISCONTINUED | OUTPATIENT
Start: 2017-07-27 | End: 2017-07-28

## 2017-07-27 RX ORDER — EPINEPHRINE 1 MG/ML
0.3 INJECTION INTRAMUSCULAR; INTRAVENOUS; SUBCUTANEOUS EVERY 5 MIN PRN
Status: DISCONTINUED | OUTPATIENT
Start: 2017-07-27 | End: 2017-08-04 | Stop reason: HOSPADM

## 2017-07-27 RX ORDER — HEPARIN SODIUM,PORCINE 10 UNIT/ML
2-5 VIAL (ML) INTRAVENOUS
Status: COMPLETED | OUTPATIENT
Start: 2017-07-27 | End: 2017-08-04

## 2017-07-27 RX ORDER — FLUCONAZOLE 200 MG/1
200 TABLET ORAL DAILY
Status: DISCONTINUED | OUTPATIENT
Start: 2017-07-27 | End: 2017-08-04 | Stop reason: HOSPADM

## 2017-07-27 RX ORDER — PREDNISONE 5 MG/1
5 TABLET ORAL DAILY
Status: DISCONTINUED | OUTPATIENT
Start: 2017-08-04 | End: 2017-08-04 | Stop reason: HOSPADM

## 2017-07-27 RX ORDER — PREDNISONE 50 MG/1
30 TABLET ORAL 2 TIMES DAILY
Status: COMPLETED | OUTPATIENT
Start: 2017-07-28 | End: 2017-08-01

## 2017-07-27 RX ORDER — ALLOPURINOL 300 MG/1
300 TABLET ORAL ONCE
Status: COMPLETED | OUTPATIENT
Start: 2017-07-27 | End: 2017-07-27

## 2017-07-27 RX ORDER — METHYLPREDNISOLONE SODIUM SUCCINATE 125 MG/2ML
125 INJECTION, POWDER, LYOPHILIZED, FOR SOLUTION INTRAMUSCULAR; INTRAVENOUS
Status: DISCONTINUED | OUTPATIENT
Start: 2017-07-27 | End: 2017-08-04 | Stop reason: HOSPADM

## 2017-07-27 RX ADMIN — TORSEMIDE 40 MG: 20 TABLET ORAL at 17:25

## 2017-07-27 RX ADMIN — ACETAMINOPHEN 500 MG: 500 TABLET, FILM COATED ORAL at 08:50

## 2017-07-27 RX ADMIN — ATENOLOL 50 MG: 50 TABLET ORAL at 20:29

## 2017-07-27 RX ADMIN — MICONAZOLE NITRATE: 2 POWDER TOPICAL at 19:50

## 2017-07-27 RX ADMIN — CALCIUM CARBONATE 600 MG (1,500 MG)-VITAMIN D3 400 UNIT TABLET 2 TABLET: at 09:14

## 2017-07-27 RX ADMIN — MULTIPLE VITAMINS W/ MINERALS TAB 1 TABLET: TAB at 08:50

## 2017-07-27 RX ADMIN — GABAPENTIN 200 MG: 100 CAPSULE ORAL at 21:35

## 2017-07-27 RX ADMIN — ALLOPURINOL 300 MG: 300 TABLET ORAL at 20:39

## 2017-07-27 RX ADMIN — FLUCONAZOLE 200 MG: 200 TABLET ORAL at 12:06

## 2017-07-27 RX ADMIN — LEVOFLOXACIN 250 MG: 250 TABLET, FILM COATED ORAL at 12:06

## 2017-07-27 RX ADMIN — POTASSIUM CHLORIDE 20 MEQ: 750 TABLET, EXTENDED RELEASE ORAL at 20:39

## 2017-07-27 RX ADMIN — DIGOXIN 125 MCG: 125 TABLET ORAL at 08:52

## 2017-07-27 RX ADMIN — MIDODRINE HYDROCHLORIDE 10 MG: 5 TABLET ORAL at 08:51

## 2017-07-27 RX ADMIN — Medication: at 13:11

## 2017-07-27 RX ADMIN — PANTOPRAZOLE SODIUM 40 MG: 40 TABLET, DELAYED RELEASE ORAL at 07:43

## 2017-07-27 RX ADMIN — FOLIC ACID 1 MG: 1 TABLET ORAL at 08:52

## 2017-07-27 RX ADMIN — GABAPENTIN 200 MG: 100 CAPSULE ORAL at 14:56

## 2017-07-27 RX ADMIN — THIAMINE HCL (VITAMIN B1) 50 MG TABLET 50 MG: at 08:51

## 2017-07-27 RX ADMIN — LIDOCAINE HYDROCHLORIDE 3 ML: 10 INJECTION, SOLUTION EPIDURAL; INFILTRATION; INTRACAUDAL; PERINEURAL at 17:07

## 2017-07-27 RX ADMIN — SODIUM CHLORIDE, PRESERVATIVE FREE 5 ML: 5 INJECTION INTRAVENOUS at 17:52

## 2017-07-27 RX ADMIN — ACYCLOVIR 400 MG: 400 TABLET ORAL at 12:06

## 2017-07-27 RX ADMIN — GABAPENTIN 200 MG: 100 CAPSULE ORAL at 08:52

## 2017-07-27 RX ADMIN — THIAMINE HYDROCHLORIDE 100 MG: 100 INJECTION, SOLUTION INTRAMUSCULAR; INTRAVENOUS at 13:11

## 2017-07-27 RX ADMIN — TORSEMIDE 40 MG: 20 TABLET ORAL at 08:53

## 2017-07-27 RX ADMIN — SODIUM CHLORIDE 6 MG: 9 INJECTION, SOLUTION INTRAVENOUS at 23:09

## 2017-07-27 RX ADMIN — ONDANSETRON 4 MG: 2 INJECTION INTRAMUSCULAR; INTRAVENOUS at 10:18

## 2017-07-27 RX ADMIN — ACYCLOVIR 400 MG: 400 TABLET ORAL at 19:49

## 2017-07-27 RX ADMIN — MIDODRINE HYDROCHLORIDE 10 MG: 5 TABLET ORAL at 17:25

## 2017-07-27 RX ADMIN — ACETAMINOPHEN 650 MG: 325 TABLET, FILM COATED ORAL at 00:41

## 2017-07-27 RX ADMIN — PREDNISONE 5 MG: 5 TABLET ORAL at 08:52

## 2017-07-27 RX ADMIN — ATENOLOL 50 MG: 50 TABLET ORAL at 08:53

## 2017-07-27 RX ADMIN — ACETAMINOPHEN 500 MG: 500 TABLET, FILM COATED ORAL at 14:56

## 2017-07-27 RX ADMIN — POTASSIUM CHLORIDE 20 MEQ: 750 TABLET, EXTENDED RELEASE ORAL at 09:15

## 2017-07-27 RX ADMIN — MIDODRINE HYDROCHLORIDE 10 MG: 5 TABLET ORAL at 12:06

## 2017-07-27 NOTE — PLAN OF CARE
Problem: Goal Outcome Summary  Goal: Goal Outcome Summary  1. Pt will have fevers well controlled  2. Pt will be hemodynamically stable   Outcome: No Change  AVSS, on 1L O2 via nasal cannula. No complaints of pain. Complaints of intermittent nausea and occasional dry heaves, denies need for intervention this shift. Pt incontinent of urine, incontinent care provided as needed. Dressing changed on wound on LUE. PICC placed on RUE this afternoon. Telemetry displaying NSR. K level 3.6, will need 20mEq oral K. Hgb 7.4, will need 1 unit PRBCs. Continue current plan of care.

## 2017-07-27 NOTE — PROGRESS NOTES
"SPIRITUAL HEALTH SERVICES  SPIRITUAL ASSESSMENT Progress Note  [facility] 5C     REFERRAL SOURCE: Follow up    Shared reflective conversation with Amelia about her cancer and change in units, her work as an ER nurse and how her family is responding to her illness. She expressed relief and gratitude to have the mystery of her medical hurdles this summer make sense, and to have a plan for treatment. She expressed gratitude that her symptoms were so severe - \"if it had just been a fever and some nausea I would have just taken some tylenol and a nap. We would have never found the cancer.\" She expressed the hope of having a smooth chemo treatment, getting sent to rehab in 6-10 days, getting strong so she can take care of herself at home, and getting to celebrate her 's birthday at the end of August. We shared a reflective prayer about her illness and her hopes for treatment.    PLAN: Will continue to follow up 1x/week.    Caitie Colón   Intern  Pager [phone]  "

## 2017-07-27 NOTE — PROGRESS NOTES
Patient A&O X 4. VSS, SR HR 60s. No fevers this AM. Denies any pain, dizziness, lightheadedness. Up SBA with walker. Transferred to Hem/onc malignant service today, transferred down to  in preparation for chemo. Report given to ABBEY Boone.

## 2017-07-27 NOTE — PLAN OF CARE
Problem: Goal Outcome Summary  Goal: Goal Outcome Summary  1. Pt will have fevers well controlled  2. Pt will be hemodynamically stable   D- pt febrile at start of the night, with a temp of 101.5 and 's to 150's in Atrial fib. Pt denied pain or discomfort. MD notified of pt's status via text. No new orders.  Pt given tylenol 650 mg (500mg dose held) for pt's temp.  Follow-up temp post tylenol dose was 99.8 and 's - 120's.  Pt in bed, continues to deny discomfort. Pt returned to SR at 60's mid early in the shift and temp = 97.8.  Pt repositioned every 2-3 hours.  I-monitored, assessed and addressed pt's status and comfort for changes  A-Rhythm change between Afib with RVR and SR, no interventions, are not new and are tolerated by pt while in bed.    P-Monitored, assessed and address pt's status and comfort for changes and treat per orders.

## 2017-07-27 NOTE — PROGRESS NOTES
[facility] CLARIFICATION NOTE FOR DISCHARGE  Lowest score for each FIM item supported by available charting  Discharge FIM Scores taken from charting on 6/18/17.     Eating: FIM 1. Clarification: Patient needed total assist with tube feeding.  Bathing: FIM 4. Patient performed wash/rinse/dry for 7 out of 9 body parts.  Upper Body Dressing: FIM 1. Activity did not occur, patient wore a gown.  Lower Body Dressing: FIM 2. Patient required max assist with lower body dressing.  Bladder:  FIM 1. Charting and verbal clarification indicates patient needed total assist to clean up and change pad after incontinent episodes.  Bowel: FIM 1. Charting and verbal clarification indicates patient needed total assist to clean up after incontinent episodes.  Tub/shower transfer: FIM 2. Patient required assist to lift and lower for tub transfer.  Locomotion distance: 125 feet.  Locomotion: FIM 2. Ambulated 125 feet with CGA.  Stairs: FIM 1. Activity did not occur.  Comprehension: FIM 6. Patient demonstrated slight difficulty with lengthy information.  Expression: FIM 6. Mild difficulty understanding complex information.  Social Interaction: FIM 7. Verbal clarification and charting indicate patient is pleasant and appropriate to staff.  Problem solving: FIM 6. Patient needed increased time to solve complex problems.  Memory: FIM 4.  Patient needed min assist to recall information. Overall needing assist to recall information less than 25% of the time.

## 2017-07-27 NOTE — PROGRESS NOTES
07/26/17 1600   Call Information   Date of Call 07/26/17   Time of Call 1610   Name of person requesting the team Tanya   Title of person requesting team RN   RRT Arrival time 1612   Time RRT ended 1617   Reason for call   Type of RRT Adult   Primary reason for call Sepsis suspected   Sepsis Suspected Elevated Lactate level   Was patient transferred from the ED, ICU, or PACU within last 24 hours prior to RRT call? No   SBAR   Situation Lactic Acid = 7.9   Background New diagnosed B-cell lymphoma   Notable History/Conditions Cancer;Cardiac   Assessment Afebrile, VSS, AOx4, pt in no distress, no complaints.   Interventions No interventions   RRT Team   Attending/Primary/Covering Physician CARDS 1   Date Attending Physician notified 07/26/17   Time Attending Physician notified 1610   Physician(s) Mike Alicia   Lead ABBEY Mayo   Post RRT Intervention Assessment   Post RRT Assessment Stable/Improved   Date Follow Up Done 07/26/17   Time Follow Up Done 1830   Comments Per primary nurse, no change in pt assessment

## 2017-07-27 NOTE — PLAN OF CARE
Problem: Goal Outcome Summary  Goal: Goal Outcome Summary  1. Pt will have fevers well controlled  2. Pt will be hemodynamically stable   Outcome: No Change  Patient admitted 6/19 from rehab for LUE wound pain and fevers. Newly diagnosed with B cell lymphoma. VSS, denies pain. A fib, HR 90s-110's. Additional atenolol dose given x1. Magnesium (1.5) and Potassium (3.6) replaced per unit protocol. Sepsis protocol triggered on previous shift, lactic acid 7.9 (rapid response called). Patient stable. Left arm amiodarone extravasation wound changed daily, see wound care orders. Incontinent, brief changed x2. Compression stockings on LE. Repositioned as tolerated. Anticipate transfer to  in AM. Continue to assess and monitor, notify Heme/Onc team with concerns.

## 2017-07-27 NOTE — PROGRESS NOTES
[facility] CLARIFICATION NOTE FOR ADMIT ASSESSMENT PERIOD  Lowest score for each FIM item supported by available charting    Eating: FIM 1. Clarification: Patient needed total assist with tube feeding.  Bathing: FIM 4. Patient performed wash/rinse/dry for 7 out of 9 body parts.  Upper Body Dressing: FIM 0. Activity did not occur, patient wore a gown.  Lower Body Dressing: FIM 2. Patient required max assist with lower body dressing.  Bladder:  FIM 1. Charting and verbal clarification indicates patient needed total assist to clean up and change pad after incontinent episodes.  Bowel: FIM 1. Charting and verbal clarification indicates patient needed total assist to clean up after incontinent episodes.  Tub/shower transfer: FIM 2. Patient required assist to lift and lower for tub transfer.  Locomotion distance:   feet.  Locomotion: FIM 2. Ambulated 75 feet with CGA.  Stairs: FIM 2. Performed 6 stairs with CGA.  Comprehension: FIM 6. Patient demonstrated slight difficulty with lengthy information.  Expression: FIM 6. Mild difficulty understanding complex information.  Social Interaction: FIM 7. Patient is pleasant and appropriate to staff.  Problem solving: FIM 6. Patient needed increased time to solve complex problems.  Memory: FIM 4.  Patient needed min assist to recall information. Overall needing assist to recall information less than 25% of the time.

## 2017-07-27 NOTE — PLAN OF CARE
Problem: Goal Outcome Summary  Goal: Goal Outcome Summary  1. Pt will have fevers well controlled  2. Pt will be hemodynamically stable   OT/EDEMA 6C; At this time, will complete edema orders as acute goals achieved. Bilateral lower extremity edema adequately being managed with use of Jobst compression stockings during daytime hours and Juxta-fit velcro personal garments for night time use - *see patient care order for details*. Please discontinue use of compression if increased pain, numbness/tingling or soiling occurs.

## 2017-07-27 NOTE — PLAN OF CARE
Problem: Goal Outcome Summary  Goal: Goal Outcome Summary  1. Pt will have fevers well controlled  2. Pt will be hemodynamically stable   OT 5C: Cancel-pt transferring units, then with another care provider this afternoon

## 2017-07-27 NOTE — PROGRESS NOTES
[facility], [place] -- Hematology / Oncology Progress Note  Date of Service (when I saw the patient): -- 07/27/2017     Assessment & Plan   Ms. Michel is a 56 year old woman with a complex past medical history of prior VSD repair, rheumatoid arthritis on long-term methotrexate, and a recent diagnosis of atrial fibrillation/flutter/SVT, who was recently admitted to [facility] on 5/29 after an out of hospital cardiac arrest.  After a prolonged hospitalization remarkable for pancytopenia, a PET scan revealed advanced stage lymphoma with liver involvement; liver biopsy confirms DLBCL, with further cytogenetic studies (MYC, Bcl2, Bcl6) currently pending.      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    --Bone marrow biopsy 7/12: scattered plasma cells in perivascular clusters, with no definitive staining of B-cell population by CD5; on re-review, possibly consistent with intravascularge large B-cell lymphoma  --Liver biopsy cytogenetics studies pending  PICC line ordered, hepatitis serologies pending; tentatively plan to initiate DA-R-EPOCH regardless of cytogenetic findings    # PPX  # Concern for neutropenia  --Patient borderline neutropenic ( on 7/26  --Initiate on PPX levofloxacin, acyclovir, and fluconazole    #TLS intermediate risk  --TLS labs Q8H;   --Anticipate need for rasburicase given UA of 7.9 prior to initiation of chemotherapy    #Pancytopenia, secondary to DLBCL  --Transfusion goals Hgb 8.0, platelets >10,000, INR 1.8, fibrinogen 150  --Consider additional diuresis with transfusions given volume overload    #DIC  --DIC labs Q8H    #  Atrial fibrillation/flutter   # Recent out of hospital cardiac arrest  # Prior VSD repair  --PET scan suggests FDG-avid lesions leading to thickening of the interatrial septum, extending superiorly along the main pulmonary artery; although cardiac MRI 7/24 shows no evidence of infiltrative disease    --Appreciate CV following, will continue patient on scheduled digoxin and atenolol as well as PRN atenolol, with continuous telemetry.        # Aortic masses  --BRE on 7/14 showed 3 small masses on the coronary cusps and LVOT. Do not believe this to be IE, does not meet [facility]'s criteria. ID is following; no antibiotics at this time. Unclear what this represents; possibly could be Libmann-Sacks.  --Per ID, meets one major [facility] critieria for endocarditis, but low suspicion given negative infectious evaluation.      #Persistent fevers and elevated lactate  --Previously followed by ID, who signed off 7/21.  Extensive infectious evaluation including blood cultures (from 6/) show NGTD; stool virus panel, M. Tuberculsosis, tick-borne illnesses, and bartonella/Q-fever have all been negative.  Fevers/lactate presumably secondary to extensive   --Possible pneumonia while recently on meropenem (7/5);  CT chest (7/13) did reveal a right lung tree-in-bud opacities with more consolidative opacities in RLL; but no accompanying focal symptoms.   --Neutropenic prophylaxis, as above    # Anasarca, volume overload  --Torsemide 40 mg BID with additional PRN  --Midodrine to aid in renal perfusion  --2g Na limit    #Rheumatoid arthritis   History of seropositive rheumatoid arthritis (anti-CCP positive) since late 1980;s w/ multiple MTP osteotomies, partial right wrist fusion, longstanding therapy consisting of MTX, prednisone and HCQ  - Tapered to 5 mg prednisone on 7/17 with continued monitoring for flaring  - Rheumatology following  - Continue to hold HCQ and MTX until outpatient follow-up  - Follow-up with [facility] Dr. Conor uCnha in 4-6 weeks after discharge    CODE: Full Code  DVT Ppx: being held due to thrombocytopenia  Diet/Fluids: 2g Na limit  Disposition: Inpatient hospitalization until completion of cycle 1 of chemotherapy    Raji Orourke  Hematology/Oncology fellow  July 27, 2017    Interval History   Doing well this AM; continues to require supplemental oxygen and continued fevers.  Otherwise she denies HA, dizziness, dysphagia,cough, CP, NVDC, dysuria, numbness/tingling.    Physical Exam   Vitals:    07/24/17 0620 07/25/17 0502 07/26/17 0620 07/27/17 0500   Weight: 72.2 kg (159 lb 3.2 oz) 71 kg (156 lb 8 oz) 67.7 kg (149 lb 3.2 oz) 67 kg (147 lb 9.6 oz)    07/27/17 1052   Weight: 67 kg (147 lb 12.8 oz)     Vital Signs with Ranges  Temp:  [97.5  F (36.4  C)-101.5  F (38.6  C)] 97.5  F (36.4  C)  Pulse:  [67] 67  Heart Rate:  [] 70  Resp:  [16-20] 16  BP: (104-132)/(56-94) 116/56  SpO2:  [96 %-97 %] 97 %  I/O last 3 completed shifts:  In: 1419 [P.O.:1316; I.V.:103]  Out: -     Constitutional: Awake, alert, cooperative, in NAD.  Eyes: PERRL, EOMI, sclera clear, conjunctiva normal.  ENT: Normocephalic, without obvious abnormality, moist mucus membranes, no lesions or thrush.   Respiratory: Non-labored breathing, good air exchange, decreased breath sounds over bases.    Cardiovascular: RRR, no murmur; well-healed midsternal scar  GI: +BS, soft, non-distended, non-tender, no masses palpated, no hepatosplenomegaly.  Skin: No concerning lesions or rash on exposed areas.  Musculoskeletal: 2+ LE edema, with compression stockings in lace  Neurologic: Awake, A&O x 3. Cranial nerves II-XII are grossly intact.   Neuropsychiatric: Calm, normal affect     MEDS  Medications     IV fluid REPLACEMENT ONLY       - MEDICATION INSTRUCTIONS -       - MEDICATION INSTRUCTIONS -         allopurinol  300 mg Oral Daily     acyclovir  400 mg Oral BID     fluconazole  200 mg Oral Daily     levofloxacin  250 mg Oral Daily     thiamine   100 mg Intravenous Daily     torsemide  40 mg Oral BID     acetaminophen  500 mg Oral Q6H     LORazepam  1 mg Oral Once     morphine 0.1% in solosite  2 g Transdermal Daily     sodium chloride (PF)  1-74 mL Intracatheter Q8H     predniSONE  5 mg Oral Daily     vitamin  B-1  50 mg Oral Daily     folic acid  1 mg Oral Daily     atenolol  50 mg Oral BID     midodrine  10 mg Oral TID w/meals     miconazole   Topical BID     pantoprazole  40 mg Oral QAM     digoxin  125 mcg Oral Daily     sodium chloride (PF)  10 mL Intracatheter Q8H     gabapentin  200 mg Oral TID     multivitamin, therapeutic with minerals  1 tablet Oral Daily     calcium-vitamin D  2 tablet Oral Daily     melatonin  1 mg Oral At Bedtime       LABS  Recent Labs   Lab Test  07/27/17   0456  07/26/17   0710  07/25/17   0734  07/24/17   0653  07/23/17   0650   WBC  2.0*  1.6*  2.0*  2.4*  2.3*   NEUTROPHIL  50.7  52.2  61.1  60.0  63.8   HGB  8.1*  7.9*  7.7*  7.6*  8.4*   PLT  31*  28*  27*  30*  37*     Recent Labs   Lab Test  07/27/17   0456  07/26/17   1803  07/26/17   0710  07/25/17   0734  07/24/17   0653   NA  131*  133  132*  135  134   POTASSIUM  3.4  3.6  3.5  4.0  3.7   CHLORIDE  90*  94  95  99  96   CO2  27  28  26  26  22   ANIONGAP  15*  12  11  10  15*   BUN  29  24  24  23  27   CR  0.70  0.60  0.69  0.61  0.71   VIKTORIYA  8.9  9.1  8.9  8.9  8.6     Recent Labs   Lab Test  07/27/17   0456  07/26/17   1803  07/24/17   0653  07/23/17   0650   MAG  2.4*  1.5*  2.0  2.0   PHOS  2.5  2.7   --   2.8   LDH  306*  321*   --   214   URIC  7.9*  7.6*   --   6.6*     Recent Labs   Lab Test  07/27/17   0456  07/26/17   1803  07/23/17   0650  07/16/17   0734  07/15/17   0803   BILITOTAL   --    --   1.2  1.7*  0.9   ALKPHOS   --    --   202*  127  116   ALT   --    --   33  37  34   AST   --    --   50*  56*  41   ALBUMIN   --    --   2.5*  3.8  2.5*   LDH  306*  321*  214  191   --        All laboratory data reviewed     CULTURES    Recent Labs   Lab  Test  07/23/17   1250  07/23/17   1246  07/15/17   1205  07/15/17   1200  07/12/17   0643   CULT  No growth after 4 days  No growth after 4 days  No growth  No growth  No growth   SDES  Blood RHAND  Blood Left Hand  Blood Right Hand  Blood Left Hand  Blood Left Hand       IMAGING  No results found for this or any previous visit (from the past 24 hour(s)).

## 2017-07-27 NOTE — PROGRESS NOTES
Hematologic Malignancy Service Note  7/26/2017    Was contacted by hematology consult service about accepting this patient for transfer to hematologic malignancy service for newly diagnosed Diffuse Large B Cell Lymphoma.    I brief, Ms. Michel is a very complicated patient with history of Rheumatoid Arthritis status post long term treatment with methotrexate with recent significant complicated course with cardiac arrest, admission with hypotension, sepsis, ICU stay and intubation with subsequent additional readmission from TCU in 6/2017 for FUO with extensive work-up with eventual finding of progression cytopenias with following findings:    Disease History:  1. Bone Marrow Biopsy: 7/12/2017: Highly suspicious for involvement by B cell lymphoma with foci of large atypical CD20+ cells  2. PET CT 7/19/2017: Extensive activity: Right paratracheal lesion with SUV 28, many liver lesions with SUV 15, cardiac lesion intraarterial septum, numerous bone lesions.  3. 7/21 Liver Biopsy: Consistent with Diffuse Large B Cell Lymphoma non-germinal center phenotype  -- FISH for myc, bcl2, bcl6 pending  4. IPI based on Age < 60 (0 points) + PS 2 (1 + point) + Stage IV Disease (1 point) + Extranodal disease > 1 site (1 point) + elevated LDH (1 point) = 4 Poor Risk Disease    Patient currently with some issues of a. Fib with RVR, no chest pain but gets nausea when HR goes fast. No current SOB, no abdominal pain, no bone pain.    Exam: no palpable cervical or supraclavicular or axillary MCKAYLA, lungs generally clear, cardiac rapid and irregular, abd soft. Left forearm with covered bandaged wound    Labs and imaging and pathology reviewed.    Plan:  - Discussed a plan to transfer to Heme Malignancy service tomorrow after HR better controlled  - Discussed her diagnosis further. Discussed the importance of knowing if her lymphoma is double hit or not to help guide treatment -- R-CHOP versus R-EPOCH  - Will need Hep B testing back before  starting  - Will need to start allopurinol  - would also start prophy abx given neutropenia. Will not dose reduce for counts given the extensive involvement of her lymphoma    Lenore Rodriguez

## 2017-07-28 LAB
ALT SERPL W P-5'-P-CCNC: 40 U/L (ref 0–50)
ANION GAP SERPL CALCULATED.3IONS-SCNC: 15 MMOL/L (ref 3–14)
ANION GAP SERPL CALCULATED.3IONS-SCNC: 17 MMOL/L (ref 3–14)
ANISOCYTOSIS BLD QL SMEAR: SLIGHT
APTT PPP: 38 SEC (ref 22–37)
APTT PPP: 40 SEC (ref 22–37)
AST SERPL W P-5'-P-CCNC: 82 U/L (ref 0–45)
BASOPHILS # BLD AUTO: 0 10E9/L (ref 0–0.2)
BASOPHILS # BLD AUTO: 0 10E9/L (ref 0–0.2)
BASOPHILS NFR BLD AUTO: 0 %
BASOPHILS NFR BLD AUTO: 0.4 %
BILIRUB DIRECT SERPL-MCNC: 1.5 MG/DL (ref 0–0.2)
BILIRUB SERPL-MCNC: 2.4 MG/DL (ref 0.2–1.3)
BLD PROD TYP BPU: NORMAL
BLD UNIT ID BPU: 0
BLOOD PRODUCT CODE: NORMAL
BPU ID: NORMAL
BUN SERPL-MCNC: 31 MG/DL (ref 7–30)
BUN SERPL-MCNC: 40 MG/DL (ref 7–30)
CALCIUM SERPL-MCNC: 8.3 MG/DL (ref 8.5–10.1)
CALCIUM SERPL-MCNC: 8.7 MG/DL (ref 8.5–10.1)
CHLORIDE SERPL-SCNC: 90 MMOL/L (ref 94–109)
CHLORIDE SERPL-SCNC: 92 MMOL/L (ref 94–109)
CO2 SERPL-SCNC: 24 MMOL/L (ref 20–32)
CO2 SERPL-SCNC: 28 MMOL/L (ref 20–32)
CREAT SERPL-MCNC: 0.76 MG/DL (ref 0.52–1.04)
CREAT SERPL-MCNC: 0.82 MG/DL (ref 0.52–1.04)
DIFFERENTIAL METHOD BLD: ABNORMAL
DIFFERENTIAL METHOD BLD: ABNORMAL
EOSINOPHIL # BLD AUTO: 0 10E9/L (ref 0–0.7)
EOSINOPHIL # BLD AUTO: 0 10E9/L (ref 0–0.7)
EOSINOPHIL NFR BLD AUTO: 0 %
EOSINOPHIL NFR BLD AUTO: 0.4 %
ERYTHROCYTE [DISTWIDTH] IN BLOOD BY AUTOMATED COUNT: 27.4 % (ref 10–15)
ERYTHROCYTE [DISTWIDTH] IN BLOOD BY AUTOMATED COUNT: 27.8 % (ref 10–15)
FIBRINOGEN PPP-MCNC: 272 MG/DL (ref 200–420)
FIBRINOGEN PPP-MCNC: 322 MG/DL (ref 200–420)
GFR SERPL CREATININE-BSD FRML MDRD: 72 ML/MIN/1.7M2
GFR SERPL CREATININE-BSD FRML MDRD: 79 ML/MIN/1.7M2
GLUCOSE BLDC GLUCOMTR-MCNC: 103 MG/DL (ref 70–99)
GLUCOSE BLDC GLUCOMTR-MCNC: 89 MG/DL (ref 70–99)
GLUCOSE SERPL-MCNC: 116 MG/DL (ref 70–99)
GLUCOSE SERPL-MCNC: 67 MG/DL (ref 70–99)
GRAM STN SPEC: NORMAL
GRAM STN SPEC: NORMAL
HBV CORE AB SERPL QL IA: NONREACTIVE
HCT VFR BLD AUTO: 23.3 % (ref 35–47)
HCT VFR BLD AUTO: 24.6 % (ref 35–47)
HGB BLD-MCNC: 7.5 G/DL (ref 11.7–15.7)
HGB BLD-MCNC: 8 G/DL (ref 11.7–15.7)
IMM GRANULOCYTES # BLD: 0 10E9/L (ref 0–0.4)
IMM GRANULOCYTES NFR BLD: 1.2 %
INR PPP: 1.42 (ref 0.86–1.14)
INR PPP: 1.82 (ref 0.86–1.14)
LACTATE BLD-SCNC: 10.5 MMOL/L (ref 0.7–2.1)
LDH SERPL L TO P-CCNC: 1446 U/L (ref 81–234)
LDH SERPL L TO P-CCNC: 298 U/L (ref 81–234)
LYMPHOCYTES # BLD AUTO: 0.1 10E9/L (ref 0.8–5.3)
LYMPHOCYTES # BLD AUTO: 0.6 10E9/L (ref 0.8–5.3)
LYMPHOCYTES NFR BLD AUTO: 2.7 %
LYMPHOCYTES NFR BLD AUTO: 25.6 %
Lab: NORMAL
Lab: NORMAL
MACROCYTES BLD QL SMEAR: PRESENT
MAGNESIUM SERPL-MCNC: 1.9 MG/DL (ref 1.6–2.3)
MCH RBC QN AUTO: 31.3 PG (ref 26.5–33)
MCH RBC QN AUTO: 32 PG (ref 26.5–33)
MCHC RBC AUTO-ENTMCNC: 32.2 G/DL (ref 31.5–36.5)
MCHC RBC AUTO-ENTMCNC: 32.5 G/DL (ref 31.5–36.5)
MCV RBC AUTO: 97 FL (ref 78–100)
MCV RBC AUTO: 98 FL (ref 78–100)
MICRO REPORT STATUS: NORMAL
MICRO REPORT STATUS: NORMAL
MONOCYTES # BLD AUTO: 0.1 10E9/L (ref 0–1.3)
MONOCYTES # BLD AUTO: 0.6 10E9/L (ref 0–1.3)
MONOCYTES NFR BLD AUTO: 24.4 %
MONOCYTES NFR BLD AUTO: 4.5 %
NEUTROPHILS # BLD AUTO: 1.2 10E9/L (ref 1.6–8.3)
NEUTROPHILS # BLD AUTO: 2.6 10E9/L (ref 1.6–8.3)
NEUTROPHILS NFR BLD AUTO: 48 %
NEUTROPHILS NFR BLD AUTO: 92.8 %
NRBC # BLD AUTO: 0 10*3/UL
NRBC # BLD AUTO: 0.1 10*3/UL
NRBC BLD AUTO-RTO: 0 /100
NRBC BLD AUTO-RTO: 2 /100
NUM BPU REQUESTED: 1
OVALOCYTES BLD QL SMEAR: SLIGHT
PHOSPHATE SERPL-MCNC: 2.7 MG/DL (ref 2.5–4.5)
PHOSPHATE SERPL-MCNC: 4 MG/DL (ref 2.5–4.5)
PLATELET # BLD AUTO: 22 10E9/L (ref 150–450)
PLATELET # BLD AUTO: 26 10E9/L (ref 150–450)
PLATELET # BLD EST: ABNORMAL 10*3/UL
POIKILOCYTOSIS BLD QL SMEAR: SLIGHT
POTASSIUM SERPL-SCNC: 3.7 MMOL/L (ref 3.4–5.3)
POTASSIUM SERPL-SCNC: 4.3 MMOL/L (ref 3.4–5.3)
RBC # BLD AUTO: 2.4 10E12/L (ref 3.8–5.2)
RBC # BLD AUTO: 2.5 10E12/L (ref 3.8–5.2)
SODIUM SERPL-SCNC: 132 MMOL/L (ref 133–144)
SODIUM SERPL-SCNC: 132 MMOL/L (ref 133–144)
SPECIMEN SOURCE: NORMAL
SPECIMEN SOURCE: NORMAL
TRANSFUSION RXN BLOOD BANK NOTIFICATION: NORMAL
TRANSFUSION STATUS PATIENT QL: NORMAL
URATE SERPL-MCNC: 2.2 MG/DL (ref 2.6–6)
URATE SERPL-MCNC: 4.2 MG/DL (ref 2.6–6)
WBC # BLD AUTO: 2.4 10E9/L (ref 4–11)
WBC # BLD AUTO: 2.8 10E9/L (ref 4–11)

## 2017-07-28 PROCEDURE — 20000002 ZZH R&B BMT INTERMEDIATE

## 2017-07-28 PROCEDURE — 3E04305 INTRODUCTION OF OTHER ANTINEOPLASTIC INTO CENTRAL VEIN, PERCUTANEOUS APPROACH: ICD-10-PCS | Performed by: INTERNAL MEDICINE

## 2017-07-28 PROCEDURE — 25000132 ZZH RX MED GY IP 250 OP 250 PS 637: Performed by: FAMILY MEDICINE

## 2017-07-28 PROCEDURE — 25000125 ZZHC RX 250: Performed by: INTERNAL MEDICINE

## 2017-07-28 PROCEDURE — 80048 BASIC METABOLIC PNL TOTAL CA: CPT | Performed by: INTERNAL MEDICINE

## 2017-07-28 PROCEDURE — 84450 TRANSFERASE (AST) (SGOT): CPT | Performed by: INTERNAL MEDICINE

## 2017-07-28 PROCEDURE — 25000132 ZZH RX MED GY IP 250 OP 250 PS 637: Performed by: NURSE PRACTITIONER

## 2017-07-28 PROCEDURE — P9059 PLASMA, FRZ BETWEEN 8-24HOUR: HCPCS | Performed by: INTERNAL MEDICINE

## 2017-07-28 PROCEDURE — 84100 ASSAY OF PHOSPHORUS: CPT | Performed by: INTERNAL MEDICINE

## 2017-07-28 PROCEDURE — 99232 SBSQ HOSP IP/OBS MODERATE 35: CPT | Mod: GC | Performed by: INTERNAL MEDICINE

## 2017-07-28 PROCEDURE — 84460 ALANINE AMINO (ALT) (SGPT): CPT | Performed by: INTERNAL MEDICINE

## 2017-07-28 PROCEDURE — 00000146 ZZHCL STATISTIC GLUCOSE BY METER IP

## 2017-07-28 PROCEDURE — P9016 RBC LEUKOCYTES REDUCED: HCPCS | Performed by: INTERNAL MEDICINE

## 2017-07-28 PROCEDURE — 25000132 ZZH RX MED GY IP 250 OP 250 PS 637: Performed by: STUDENT IN AN ORGANIZED HEALTH CARE EDUCATION/TRAINING PROGRAM

## 2017-07-28 PROCEDURE — 83615 LACTATE (LD) (LDH) ENZYME: CPT | Performed by: INTERNAL MEDICINE

## 2017-07-28 PROCEDURE — 25000128 H RX IP 250 OP 636: Performed by: INTERNAL MEDICINE

## 2017-07-28 PROCEDURE — 83735 ASSAY OF MAGNESIUM: CPT | Performed by: INTERNAL MEDICINE

## 2017-07-28 PROCEDURE — 25000132 ZZH RX MED GY IP 250 OP 250 PS 637: Performed by: INTERNAL MEDICINE

## 2017-07-28 PROCEDURE — 40000556 ZZH STATISTIC PERIPHERAL IV START W US GUIDANCE

## 2017-07-28 PROCEDURE — 99231 SBSQ HOSP IP/OBS SF/LOW 25: CPT | Mod: GC | Performed by: INTERNAL MEDICINE

## 2017-07-28 PROCEDURE — 25000128 H RX IP 250 OP 636: Performed by: PHYSICIAN ASSISTANT

## 2017-07-28 PROCEDURE — 40000344 ZZHCL STATISTIC THAWING COMPONENT: Performed by: INTERNAL MEDICINE

## 2017-07-28 PROCEDURE — 83605 ASSAY OF LACTIC ACID: CPT | Performed by: INTERNAL MEDICINE

## 2017-07-28 PROCEDURE — 40000559 ZZH STATISTIC FAILED PERIPHERAL IV START

## 2017-07-28 PROCEDURE — 82248 BILIRUBIN DIRECT: CPT | Performed by: INTERNAL MEDICINE

## 2017-07-28 PROCEDURE — 99207 ZZC CDG-CORRECTLY CODED, REVIEWED AND AGREE: CPT | Performed by: INTERNAL MEDICINE

## 2017-07-28 PROCEDURE — 84550 ASSAY OF BLOOD/URIC ACID: CPT | Performed by: INTERNAL MEDICINE

## 2017-07-28 PROCEDURE — 85384 FIBRINOGEN ACTIVITY: CPT | Performed by: INTERNAL MEDICINE

## 2017-07-28 PROCEDURE — 85610 PROTHROMBIN TIME: CPT | Performed by: INTERNAL MEDICINE

## 2017-07-28 PROCEDURE — 85730 THROMBOPLASTIN TIME PARTIAL: CPT | Performed by: INTERNAL MEDICINE

## 2017-07-28 PROCEDURE — 87040 BLOOD CULTURE FOR BACTERIA: CPT | Performed by: INTERNAL MEDICINE

## 2017-07-28 PROCEDURE — 25000132 ZZH RX MED GY IP 250 OP 250 PS 637: Performed by: PHYSICIAN ASSISTANT

## 2017-07-28 PROCEDURE — 82247 BILIRUBIN TOTAL: CPT | Performed by: INTERNAL MEDICINE

## 2017-07-28 PROCEDURE — 85025 COMPLETE CBC W/AUTO DIFF WBC: CPT | Performed by: INTERNAL MEDICINE

## 2017-07-28 PROCEDURE — 25000131 ZZH RX MED GY IP 250 OP 636 PS 637: Performed by: INTERNAL MEDICINE

## 2017-07-28 PROCEDURE — 40000341 ZZHCL STATISTIC BB TRANSF RXN INVEST: Performed by: INTERNAL MEDICINE

## 2017-07-28 PROCEDURE — 25000132 ZZH RX MED GY IP 250 OP 250 PS 637

## 2017-07-28 RX ORDER — SENNOSIDES 8.6 MG
2-3 TABLET ORAL 2 TIMES DAILY
Status: DISCONTINUED | OUTPATIENT
Start: 2017-07-28 | End: 2017-07-29

## 2017-07-28 RX ORDER — POLYETHYLENE GLYCOL 3350 17 G/17G
17 POWDER, FOR SOLUTION ORAL 2 TIMES DAILY PRN
Status: DISCONTINUED | OUTPATIENT
Start: 2017-07-28 | End: 2017-08-04 | Stop reason: HOSPADM

## 2017-07-28 RX ORDER — POLYETHYLENE GLYCOL 3350 17 G/17G
17 POWDER, FOR SOLUTION ORAL ONCE
Status: COMPLETED | OUTPATIENT
Start: 2017-07-28 | End: 2017-07-28

## 2017-07-28 RX ORDER — PHYTONADIONE 5 MG/1
10 TABLET ORAL DAILY
Status: COMPLETED | OUTPATIENT
Start: 2017-07-28 | End: 2017-07-30

## 2017-07-28 RX ORDER — POLYETHYLENE GLYCOL 3350 17 G/17G
17 POWDER, FOR SOLUTION ORAL DAILY
Status: DISCONTINUED | OUTPATIENT
Start: 2017-07-28 | End: 2017-07-28

## 2017-07-28 RX ADMIN — RITUXIMAB 600 MG: 10 INJECTION, SOLUTION INTRAVENOUS at 09:24

## 2017-07-28 RX ADMIN — POTASSIUM CHLORIDE 20 MEQ: 750 TABLET, EXTENDED RELEASE ORAL at 06:30

## 2017-07-28 RX ADMIN — SODIUM PHOSPHATE, MONOBASIC, MONOHYDRATE AND SODIUM PHOSPHATE, DIBASIC, ANHYDROUS 10 MMOL: 276; 142 INJECTION, SOLUTION INTRAVENOUS at 06:59

## 2017-07-28 RX ADMIN — FOLIC ACID 1 MG: 1 TABLET ORAL at 08:22

## 2017-07-28 RX ADMIN — ACETAMINOPHEN 500 MG: 500 TABLET, FILM COATED ORAL at 00:13

## 2017-07-28 RX ADMIN — DIGOXIN 125 MCG: 125 TABLET ORAL at 08:24

## 2017-07-28 RX ADMIN — CALCIUM CARBONATE 600 MG (1,500 MG)-VITAMIN D3 400 UNIT TABLET 2 TABLET: at 08:37

## 2017-07-28 RX ADMIN — LEVOFLOXACIN 250 MG: 250 TABLET, FILM COATED ORAL at 12:17

## 2017-07-28 RX ADMIN — MEPERIDINE HYDROCHLORIDE 12.5 MG: 25 INJECTION, SOLUTION INTRAMUSCULAR; INTRAVENOUS; SUBCUTANEOUS at 11:30

## 2017-07-28 RX ADMIN — PHYTONADIONE 10 MG: 5 TABLET ORAL at 16:38

## 2017-07-28 RX ADMIN — PANTOPRAZOLE SODIUM 40 MG: 40 TABLET, DELAYED RELEASE ORAL at 06:13

## 2017-07-28 RX ADMIN — DOXORUBICIN HYDROCHLORIDE 0.66 MG: 2 INJECTION, SOLUTION INTRAVENOUS at 16:32

## 2017-07-28 RX ADMIN — ONDANSETRON 4 MG: 2 INJECTION INTRAMUSCULAR; INTRAVENOUS at 12:09

## 2017-07-28 RX ADMIN — POLYETHYLENE GLYCOL 3350 17 G: 17 POWDER, FOR SOLUTION ORAL at 17:44

## 2017-07-28 RX ADMIN — ALLOPURINOL 300 MG: 300 TABLET ORAL at 08:21

## 2017-07-28 RX ADMIN — GABAPENTIN 200 MG: 100 CAPSULE ORAL at 21:01

## 2017-07-28 RX ADMIN — PREDNISONE 50 MG: 50 TABLET ORAL at 08:24

## 2017-07-28 RX ADMIN — Medication: at 16:48

## 2017-07-28 RX ADMIN — TORSEMIDE 40 MG: 20 TABLET ORAL at 16:38

## 2017-07-28 RX ADMIN — MIDODRINE HYDROCHLORIDE 10 MG: 5 TABLET ORAL at 12:17

## 2017-07-28 RX ADMIN — MICONAZOLE NITRATE: 2 POWDER TOPICAL at 08:27

## 2017-07-28 RX ADMIN — SENNOSIDES 2 TABLET: 8.6 TABLET, FILM COATED ORAL at 21:00

## 2017-07-28 RX ADMIN — MIDODRINE HYDROCHLORIDE 10 MG: 5 TABLET ORAL at 17:44

## 2017-07-28 RX ADMIN — SENNOSIDES AND DOCUSATE SODIUM 1 TABLET: 8.6; 5 TABLET ORAL at 08:25

## 2017-07-28 RX ADMIN — MIDODRINE HYDROCHLORIDE 10 MG: 5 TABLET ORAL at 08:23

## 2017-07-28 RX ADMIN — ATENOLOL 50 MG: 50 TABLET ORAL at 08:28

## 2017-07-28 RX ADMIN — DIPHENHYDRAMINE HYDROCHLORIDE 50 MG: 50 CAPSULE ORAL at 08:21

## 2017-07-28 RX ADMIN — GABAPENTIN 200 MG: 100 CAPSULE ORAL at 08:20

## 2017-07-28 RX ADMIN — THIAMINE HCL (VITAMIN B1) 50 MG TABLET 50 MG: at 08:21

## 2017-07-28 RX ADMIN — ETOPOSIDE 40 MG: 20 INJECTION, SOLUTION, CONCENTRATE INTRAVENOUS at 16:31

## 2017-07-28 RX ADMIN — PREDNISONE 50 MG: 50 TABLET ORAL at 16:06

## 2017-07-28 RX ADMIN — ACYCLOVIR 400 MG: 400 TABLET ORAL at 21:01

## 2017-07-28 RX ADMIN — GABAPENTIN 200 MG: 100 CAPSULE ORAL at 14:02

## 2017-07-28 RX ADMIN — METHYLPREDNISOLONE 125 MG: 125 INJECTION, POWDER, LYOPHILIZED, FOR SOLUTION INTRAMUSCULAR; INTRAVENOUS at 10:19

## 2017-07-28 RX ADMIN — FUROSEMIDE 5 MG/HR: 10 INJECTION, SOLUTION INTRAVENOUS at 18:46

## 2017-07-28 RX ADMIN — MULTIPLE VITAMINS W/ MINERALS TAB 1 TABLET: TAB at 08:22

## 2017-07-28 RX ADMIN — MICONAZOLE NITRATE: 2 POWDER TOPICAL at 21:03

## 2017-07-28 RX ADMIN — FLUCONAZOLE 200 MG: 200 TABLET ORAL at 08:21

## 2017-07-28 RX ADMIN — ACETAMINOPHEN 650 MG: 325 TABLET, FILM COATED ORAL at 12:17

## 2017-07-28 RX ADMIN — ACETAMINOPHEN 650 MG: 325 TABLET, FILM COATED ORAL at 08:21

## 2017-07-28 RX ADMIN — THIAMINE HYDROCHLORIDE 100 MG: 100 INJECTION, SOLUTION INTRAMUSCULAR; INTRAVENOUS at 08:44

## 2017-07-28 RX ADMIN — ONDANSETRON HYDROCHLORIDE 16 MG: 8 TABLET, FILM COATED ORAL at 16:06

## 2017-07-28 RX ADMIN — ATENOLOL 50 MG: 50 TABLET ORAL at 21:00

## 2017-07-28 RX ADMIN — ACYCLOVIR 400 MG: 400 TABLET ORAL at 08:38

## 2017-07-28 NOTE — PLAN OF CARE
Problem: Goal Outcome Summary  Goal: Goal Outcome Summary  1. Pt will have fevers well controlled  2. Pt will be hemodynamically stable   OT 5C: Cancel this AM.  Pt having reaction to chemo at time of attempt.  RN requests to hold therapy at this time and check back later.

## 2017-07-28 NOTE — PROGRESS NOTES
[facility], [place]    Palliative Care Progress Note    Patient: Amelia Michel  Date of Admission:  6/19/2017    Recommendations:  - Schedule senna, 2-3 tabs PO BID  - Add miralax daily  - Encourage use of PRN zofran for nausea   - Will have massage therapy visit pt next week  - Consider extra dose of diuretic if ongoing labored breathing      Assessment & Plan   Amelia Michel is a 56 year old female with PMHx RA, VSD repair (1969), recent prolonged hospitalization for cardiac arrest complicated by cardiogenic shock who was slowly recovering at ARU and was readmitted from ARU with fever and workup has revealed lymphoma (based on BMBx and PET scan, tissue biopsy pending).     Symptom Management: Only active symptom pt currently endorsing was nausea, but had not yet requested PRN nausea medication. Is beginning to feel constipated and needed to strain to have a BM today. Would encourage increasing her bowel regimen and adding on miralax as needed. Enjoys meeting with  and will have palliative SW meet with her when able. Did note breathing was slightly labored and on exam, noted to have crackles bilaterally, could possibly benefit from extra diuretic dose vs monitoring fluid intake (including IV fluids from chemotherapies).     These recommendations have been discussed with Dr. Betty Patrick MD.    Chilango Maldonado  Pager: [phone]  [facility] Inpatient Team Consult pager [phone] (M-F 8-4:30)  After-hours Answering Service [phone]   Main Palliative Clinic - [facility] 1C: [phone]     Patient seen and evaluated with Dr. Maldonado.   Agree with assessment and recommendations.    Total time spent was 30 minutes,  >50% of time was spent counseling and/or coordination of care regarding symptoms, support.    Beth Patrick  Palliative Care   Pager [phone]    Interval History:  No acute events overnight. Feeling a bit queezy this morning, but has not taken her zofran  "because it makes her mouth dry and it's already very dry right now from her benadryl this morning. Also a bit worried about her breathing, feels it is \"labored\" and thinks it's because she had so much fluids with her therapy last night. Last BM was this morning, but felt she had to strain a lot with it. Has some lower back pain, but thinks its really just from being in bed so much.     Medications   I have reviewed this patient's medication profile and medications given in the past 24 hours.     Review of Systems   Palliative Symptom Review (0=no symptom/no concern, 1=mild, 2=moderate, 3=severe):  Pain: 1  Fatigue: 1  Nausea: 1  Constipation: 1  Diarrhea: 0  Depressive Symptoms: 0  Anxiety: 0  Drowsiness: 0  Poor Appetite: 0  Shortness of Breath: 1  Insomnia: 0  Delirium: 0  Other: 0  Overall (0 good/no concerns, 3 very poor): 1    Physical Exam   Vital Signs: Temp: 101.1  F (38.4  C) Temp src: Axillary BP: 132/81 Pulse: 59 Heart Rate: 73 Resp: 20 SpO2: 98 % O2 Device: Nasal cannula Oxygen Delivery: 2 LPM  Weight: 148 lbs 12.97 oz    Physical Exam:  Gen: in bed, appears tired, no acute distress  HEENT: NC/AT, EOMI, dry mucous membranes   Skin: Warm and well perfused, no obvious lesions or rashes   Cardio: RRR, normal s1/s2, no m/r/g  Resp: slight increase in work of breathing, crackles at bases  Abd: BS+, soft, non distended, non tender, no rebound/guarding/rigidity  Msk: Moving all 4 extremities equally  Ext: No edema, cyanosis, or clubbing. Pulses 2+ and symmetrical   Neuro: A&O x 3, no gross focal deficits, sensation intact and symmetric   Psych: Appropriate mood and affect, speech/thoughts coherent     Data reviewed today:   - All labs/imaging reviewed in EPIC     Chilango Maldonado  Pager: [phone]  [facility] Inpatient Team Consult pager [phone] (M-F 8-4:30)  After-hours Answering Service [phone]   Palliative Clinic:  [phone]    "

## 2017-07-28 NOTE — PROGRESS NOTES
[facility], [place]    Hematology / Oncology Progress Note    Date of Service (when I saw the patient): 07/28/2017     Assessment & Plan   Amelia Michel is a 56 year old female who was admitted on 6/19/2017. She has complex past medical history of prior VSD repair, rheumatoid arthritis on long-term methotrexate, recent dx atrial fibrillation/flutter/SVT who was recently admitted to [facility] on 5/29 after an out of hospital cardiac arrest, admission complicated by intubation with discharge to TCU. Was discharged to TCU and subsequently readmitted to Cardiology service for FUO. With workup of fever and progressive cytopenias, diagnosis of DLBCL was made. She was transferred to the Hematology service on 7/26. See discussions of diagnosis and reasoning for treatment plan in 7/27 Heme/Onc note. Starting Cycle 1 R-EPOCH on 7/28.     HEME:   #DLBCL. Stage 4 with bone marrow and liver involvement. IPI 4. Possibly related to prior methotrexate.   - PET/CT 7/18 revealing for mediastinal and neck level 2A lymphadenopathy, extensive FDG-avid bony lesions, hepatosplenic FDG-avid lesions, pelvic lesions contingous with the uterus, and bilateral pleural effusions.    - Bone marrow biopsy 7/12: scattered plasma cells in perivascular clusters, with no definitive staining of B-cell population by CD5; on re-review, possibly consistent with intravascularge large B-cell lymphoma. However, liver biopsy (7/21) consistent with DLBCL, negative for BCL2/BCL6/MYC rearrangements, CD20 positive.   - PICC line placed    Treatment Plan: DA-EPOCH-R Cycle 1 (Day 1=7/28/17)  - reaction to rituxan (hypotension with BP 87/45, SOB/feeling of ?throat swelling/difficulty getting breath in). No stridor. No rashes or hives. Received rescue methylpred  - to start Etoposide and Vincristine/Doxorubicin after completion of Rituxan  - Prednisone 50mg BID x 5 days    #Hyperuricemia  #Monitor for TLS.  Uric acid up to 8.2 (7/27 PM),  s/p Rasburicase. Uric acid improved to 4.2. Continue IVFs and Allopurinol. BID TLS labs at this time.     #DIC. INR prolonged -- increased to 1.82 today. PTT mildly elevated, Fibrinogen currently ok.   - conditional transfusion parameters in place: 2U plasma for INR >1.8, 5U cryo for fibrinogen <150.   - will also start Vitamin K 10mg PO x 3 days (7/28-7/30)  - monitor coags BID    #Pancytopenia, secondary to DLBCL  - transfuse to maintain Hgb >8.0, platelets >10,000    ID:   #PPX  - continue ppx ACV, Levaquin, Fluconazole     #Persistent Fevers.  #Lactic acidosis.   Previously followed by ID, who signed off 7/21.  Extensive infectious evaluation including blood cultures, which remain NGTD. Additionally, stool virus panel, M. Tuberculsosis, tick-borne illnesses, and bartonella/Q-fever have all been negative. Fevers/lactate secondary to malignancy.   - possible pneumonia while recently on meropenem (7/1-7/5);  CT chest (7/13) did reveal a right lung tree-in-bud opacities with more consolidative opacities in RLL; but no accompanying focal symptoms. Appears to have received dose of Meropenem on 7/14, now off.   - lactic acid increased to 10.4 today during Rituxan infusion reaction.   - cultures have been NGTD, avoid additional bolus fluids given hypervolemia, monitor closely    CV/PV:    #Atrial fibrillation/flutter   #Recent out of hospital cardiac arrest (5/29/17)  #Prior VSD repair (1969)  - PET scan suggested FDG-avid lesions leading to thickening of the interatrial septum, extending superiorly along the main pulmonary artery; although cardiac MRI 7/24 shows no evidence of infiltrative disease    - Appreciate Cardiology team recs  - continue Digoxin 125mcg daily  - continue Atenolol 50mg BID. Per pt/, metoprolol has not worked for her and makes her feel poorly.   - PRN atenolol   - continuous telemetry     #Aortic masses. BRE on 7/14 showed 3 small masses on the coronary cusps and LVOT. Do not believe this  to be endocarditis; no antibiotics at this time. Unclear what this represents; possibly could be Libmann-Sacks.      #Anasarca, volume overload  - Torsemide 40 mg BID with additional PRN. Per Cards update today: change diuretic to Lasix gtt at 5mg/hr given fluids with CIVI chemo (which is generally around 70cc/hr). This may also help with additional diuresis with increased blood product needs.   - Midodrine to aid in renal perfusion  - 2g Na limit    GI:   #Nausea, mild.   - will now have scheduled antiemetic (Zofran, Emend)) with chemo, PRN Compazine/Ativan    #Constipation. Stools harder, though did have a BM today  - SennaS 2-3 tabs BID  - Miralax BID PRN    NEURO:  #Saddle anesthesia, urinary incontinence. Pt reports this is baseline for her. Will need to look further into her eval/workup, but keeping on record at this time.     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 HOLDING PTA Prednisone with plan for higher dose steroids on treatment plan. Resume 5mg dose as of 8/4.   - Follow-up with [facility] clinic Dr. Conor Cunha in 4-6 weeks after discharge    DERM:   #Extravasation-related LUE wound. Slowly improving.   - WOCN following, appreciate recs    GEN:  #Deconditioning. 2/2 prolonged hospital stays, acute illness/malignancy  - PT/OT. Recent rec for ARU, so PM&R consult placed.     CODE: Full Code    PPx:   - VTE: defer VTE pharmacologic ppx due to thrombocytopenia  - GI: continue protonix    FEN  - 2g Na diet  - Dietician following    Disposition: Inpatient hospitalization until completion of cycle 1 of chemotherapy. PT      Kalpana Wren PA-C  Heme/Onc  [phone] (pager)     Interval History   No acute events overnight.  Seen this AM, sitting in bed, preparing to have cereal for breakfast. She is doing ok. Intermittent fevers continue. She feels poorly with these. Night was interrupted with cultures and transfusion workup for temp overnight. Having some pain in her back, which she attributes to positioning in bed (especially given her LOS). SOB at rest, on ~1L by NC. Denies feeling wheezy. Denies chest pain or chest pressure. Denies current palpitations or feeling of rapid heart rate (states she can feel this when she is in afib). Reports intermittent nausea. Had BM this AM, but thinks she might be starting to get constipated. She reports urinary incontinence at baseline (h/o saddle anesthesia per pt and her ). Edema in legs improving, but still feeing full/edematous centrally.       Physical Exam   Temp: 99  F (37.2  C) Temp src: Axillary BP: 102/50 Pulse: 59 Heart Rate: 63 Resp: 19 SpO2: 97 % O2 Device: Nasal cannula Oxygen Delivery: 2 LPM  Vitals:    07/27/17 0500 07/27/17 1052 07/28/17 0748   Weight: 67 kg (147 lb 9.6 oz) 67 kg (147 lb 12.8 oz) 67.5 kg (148 lb 13 oz)     Vital Signs with Ranges  Temp:  [97.4  F (36.3  C)-102.3  F (39.1  C)] 99  F (37.2  C)  Pulse:  [59] 59  Heart Rate:  [58-73] 63  Resp:  [12-29] 19  BP: ()/(45-81) 102/50  SpO2:  [95 %-99 %] 97 %  I/O last 3 completed shifts:  In: 450   Out: -   Constitutional: Pleasant female seen sitting in bed. She is awake, alert, interactive.  at bedside.   HEENT: NC/AT. MMM, lesions or thrush.   Respiratory: Breathing appears slightly labored with talking but no acute distress; decent air exchange, on 1L by NC. Able to sit up with assist, decreased breath sounds over bases and soft expiratory wheeze in left upper posterior lung field. No crackles.   Cardiovascular: RRR. Holosystolic murmur appreciated over precordium.   GI: Normal BS. Abd soft, non-distended. Reports discomfort with palpation diffusely. I cannot well appreciate hepatosplenomegaly with  light palpation.   Skin: LUE wound (extravasation at time of her cardiac arrest) covered with dressing that is c/d/i. Appears to have dried drainage under dressing. Picture from yesterday (on 's phone) appears to be improving.   Musculoskeletal: 2+ LE edema, with compression stockings in place.   Neurologic: Awake, alert and oriented. Cranial nerves II-XII are grossly intact.   Neuropsychiatric: Calm, normal affect     Medications     - MEDICATION INSTRUCTIONS -       NaCl       IV fluid REPLACEMENT ONLY       - MEDICATION INSTRUCTIONS -         melatonin  1-3 mg Oral At Bedtime     etoposide (TOPOSAR) chemo infusion  25 mg/m2 (Treatment Plan Recorded) Intravenous Q24H     allopurinol  300 mg Oral Daily     acyclovir  400 mg Oral BID     fluconazole  200 mg Oral Daily     levofloxacin  250 mg Oral Daily     ondansetron  16 mg Oral Q24H     [START ON 8/1/2017] fosaprepitant (EMEND) 150 mg intermittent infusion  150 mg Intravenous Once     [START ON 8/2/2017] dexamethasone  8 mg Oral Daily     predniSONE  30 mg/m2 (Treatment Plan Recorded) Oral BID     Chemotherapy Infusing-Continuous Infusion   Does not apply Q8H     [START ON 8/2/2017] filgrastim (NEUPOGEN/GRANIX) intravenous  5 mcg/kg (Treatment Plan Recorded) Intravenous Daily at 8 pm     senna-docusate  2 tablet Oral BID     vinCRIStine/DOXOrubicin (ONCOVIN/ADRIAMYCIN) chemo infusion  0.4 mg/m2 (Treatment Plan Recorded) Intravenous Q24H     [START ON 8/1/2017] cyclophosphamide (CYTOXAN) chemo infusion  750 mg/m2 (Treatment Plan Recorded) Intravenous Once     sodium chloride (PF)  10 mL Intracatheter Q7 Days     [START ON 8/4/2017] predniSONE  5 mg Oral Daily     torsemide  40 mg Oral BID     LORazepam  1 mg Oral Once     morphine 0.1% in solosite  2 g Transdermal Daily     sodium chloride (PF)  1-74 mL Intracatheter Q8H     vitamin  B-1  50 mg Oral Daily     folic acid  1 mg Oral Daily     atenolol  50 mg Oral BID     midodrine  10 mg Oral TID w/meals      miconazole   Topical BID     pantoprazole  40 mg Oral QAM     digoxin  125 mcg Oral Daily     sodium chloride (PF)  10 mL Intracatheter Q8H     gabapentin  200 mg Oral TID     multivitamin, therapeutic with minerals  1 tablet Oral Daily     calcium-vitamin D  2 tablet Oral Daily       Data   Results for orders placed or performed during the hospital encounter of 06/19/17 (from the past 24 hour(s))   XR Chest Port 1 View    Narrative    History: PICC placement.    COMPARISON: Single portable chest 7/24/2017 at 0935 hours.    FINDINGS: Right-sided PICC has been placed with the catheter tip  overlying the mid superior vena cava. This is good position. Hazy  opacity right lung base again noted and has not changed significantly.  The left lung is clear. No pneumothorax or left pleural effusion. The  cardiac silhouette remains mildly to moderately enlarged and the  pulmonary vasculature is unchanged and within normal limits. No acute  bony abnormality is about the thorax in this single image.      Impression    IMPRESSION:  1. Satisfactory appearance of right-sided PICC. This should be  available for use within limits of this single frontal radiograph.  2. Persistent layering right effusion and associated atelectasis.    JOSE OLSEN MD   Basic metabolic panel   Result Value Ref Range    Sodium 133 133 - 144 mmol/L    Potassium 3.6 3.4 - 5.3 mmol/L    Chloride 92 (L) 94 - 109 mmol/L    Carbon Dioxide 27 20 - 32 mmol/L    Anion Gap 14 3 - 14 mmol/L    Glucose 128 (H) 70 - 99 mg/dL    Urea Nitrogen 31 (H) 7 - 30 mg/dL    Creatinine 0.65 0.52 - 1.04 mg/dL    GFR Estimate >90  Non  GFR Calc   >60 mL/min/1.7m2    GFR Estimate If Black >90   GFR Calc   >60 mL/min/1.7m2    Calcium 8.4 (L) 8.5 - 10.1 mg/dL   Hepatitis B core antibody   Result Value Ref Range    Hepatitis B Core Dasha Nonreactive NR   CBC with platelets differential   Result Value Ref Range    WBC 2.4 (L) 4.0 - 11.0 10e9/L    RBC  Count 2.34 (L) 3.8 - 5.2 10e12/L    Hemoglobin 7.4 (L) 11.7 - 15.7 g/dL    Hematocrit 22.9 (L) 35.0 - 47.0 %    MCV 98 78 - 100 fl    MCH 31.6 26.5 - 33.0 pg    MCHC 32.3 31.5 - 36.5 g/dL    RDW 28.1 (H) 10.0 - 15.0 %    Platelet Count 28 (LL) 150 - 450 10e9/L    Diff Method Automated Method     % Neutrophils 52.2 %    % Lymphocytes 21.6 %    % Monocytes 25.0 %    % Eosinophils 0.4 %    % Basophils 0.4 %    % Immature Granulocytes 0.4 %    Nucleated RBCs 0 0 /100    Absolute Neutrophil 1.2 (L) 1.6 - 8.3 10e9/L    Absolute Lymphocytes 0.5 (L) 0.8 - 5.3 10e9/L    Absolute Monocytes 0.6 0.0 - 1.3 10e9/L    Absolute Eosinophils 0.0 0.0 - 0.7 10e9/L    Absolute Basophils 0.0 0.0 - 0.2 10e9/L    Abs Immature Granulocytes 0.0 0 - 0.4 10e9/L    Absolute Nucleated RBC 0.0     Platelet Estimate Confirming automated cell count    Fibrinogen activity   Result Value Ref Range    Fibrinogen 303 200 - 420 mg/dL   INR   Result Value Ref Range    INR 1.57 (H) 0.86 - 1.14   Lactate Dehydrogenase   Result Value Ref Range    Lactate Dehydrogenase 302 (H) 81 - 234 U/L   Partial thromboplastin time   Result Value Ref Range    PTT 37 22 - 37 sec   Phosphorus   Result Value Ref Range    Phosphorus 3.1 2.5 - 4.5 mg/dL   Uric acid   Result Value Ref Range    Uric Acid 8.2 (H) 2.6 - 6.0 mg/dL   ABO/Rh type and screen   Result Value Ref Range    Units Ordered 1     ABO O     RH(D)  Neg     Antibody Screen Neg     Test Valid Only At       [facility]    Specimen Expires 07/30/2017     Crossmatch Red Blood Cells    Blood component   Result Value Ref Range    Unit Number [id number]     Blood Component Type Red Blood Cells Leukocyte Reduced     Division Number 00     Status of Unit Released to care unit 07/28/2017 0457     Blood Product Code B0124V83     Unit Status ISS    Plasma prepare order unit conditional   Result Value Ref Range    Ordered Component Type Plasma     Units Ordered 1    Blood  component   Result Value Ref Range    Unit Number [id number]     Blood Component Type Plasma, Thawed     Division Number 00     Status of Unit Released to care unit 07/28/2017 0518     Blood Product Code [id number]     Unit Status ISS    Transfusion reaction evaluation   Result Value Ref Range    TRX Interpretation Pending    Blood component   Result Value Ref Range    Unit Number [id number]     Blood Component Type Red Blood Cells Leukocyte Reduced     Division Number 00     Status of Unit No longer available 07/28/2017 0457     Blood Product Code [id number]     Unit Status RET    Transfusion reaction evaluation   Result Value Ref Range    TRX Interpretation Pending    Blood component   Result Value Ref Range    Unit Number [id number]     Blood Component Type Plasma, Thawed     Division Number 00     Status of Unit No longer available 07/28/2017 0518     Blood Product Code [id number]     Unit Status RET    Blood culture   Result Value Ref Range    Specimen Description Blood PURPLE PORT     Culture Micro No growth after 11 hours     Micro Report Status Pending    Blood culture   Result Value Ref Range    Specimen Description Blood Red port     Culture Micro No growth after 11 hours     Micro Report Status Pending    Blood culture   Result Value Ref Range    Specimen Description Other Transfusion Reaction Donor Unit     Special Requests Amelia Middleton, [id number]     Culture Micro Culture negative monitoring continues     Micro Report Status Pending    Gram stain   Result Value Ref Range    Specimen Description Other Transfusion Reaction Donor Unit     Special Requests Amelia Middleton, [id number]     Gram Stain       No organisms seen  Called to [facility], @ 0348 07.28.2017       Micro Report Status FINAL 07/28/2017    Blood culture   Result Value Ref Range    Specimen Description Other Transfusion Reaction Donor Unit     Special Requests AMELIA MIDDLETON, [id number]     Culture Micro Culture  negative monitoring continues     Micro Report Status Pending    Gram stain   Result Value Ref Range    Specimen Description Other Transfusion Reaction Donor Unit     Special Requests FIDELIA MIDDLETON, [id number]     Gram Stain       No organisms seen  Called to [facility], @ 0343 07.28.2017       Micro Report Status FINAL 07/28/2017    ALT   Result Value Ref Range    ALT 40 0 - 50 U/L   AST   Result Value Ref Range    AST 82 (H) 0 - 45 U/L   Basic metabolic panel   Result Value Ref Range    Sodium 132 (L) 133 - 144 mmol/L    Potassium 3.7 3.4 - 5.3 mmol/L    Chloride 90 (L) 94 - 109 mmol/L    Carbon Dioxide 28 20 - 32 mmol/L    Anion Gap 15 (H) 3 - 14 mmol/L    Glucose 67 (L) 70 - 99 mg/dL    Urea Nitrogen 31 (H) 7 - 30 mg/dL    Creatinine 0.76 0.52 - 1.04 mg/dL    GFR Estimate 79 >60 mL/min/1.7m2    GFR Estimate If Black >90   GFR Calc   >60 mL/min/1.7m2    Calcium 8.7 8.5 - 10.1 mg/dL   Bilirubin Direct and Total   Result Value Ref Range    Bilirubin Direct 1.5 (H) 0.0 - 0.2 mg/dL    Bilirubin Total 2.4 (H) 0.2 - 1.3 mg/dL   CBC with platelets differential   Result Value Ref Range    WBC 2.4 (L) 4.0 - 11.0 10e9/L    RBC Count 2.50 (L) 3.8 - 5.2 10e12/L    Hemoglobin 8.0 (L) 11.7 - 15.7 g/dL    Hematocrit 24.6 (L) 35.0 - 47.0 %    MCV 98 78 - 100 fl    MCH 32.0 26.5 - 33.0 pg    MCHC 32.5 31.5 - 36.5 g/dL    RDW 27.4 (H) 10.0 - 15.0 %    Platelet Count 26 (LL) 150 - 450 10e9/L    Diff Method Automated Method     % Neutrophils 48.0 %    % Lymphocytes 25.6 %    % Monocytes 24.4 %    % Eosinophils 0.4 %    % Basophils 0.4 %    % Immature Granulocytes 1.2 %    Nucleated RBCs 0 0 /100    Absolute Neutrophil 1.2 (L) 1.6 - 8.3 10e9/L    Absolute Lymphocytes 0.6 (L) 0.8 - 5.3 10e9/L    Absolute Monocytes 0.6 0.0 - 1.3 10e9/L    Absolute Eosinophils 0.0 0.0 - 0.7 10e9/L    Absolute Basophils 0.0 0.0 - 0.2 10e9/L    Abs Immature Granulocytes 0.0 0 - 0.4 10e9/L    Absolute Nucleated RBC 0.0     Fibrinogen activity   Result Value Ref Range    Fibrinogen 322 200 - 420 mg/dL   INR   Result Value Ref Range    INR 1.42 (H) 0.86 - 1.14   Lactate Dehydrogenase   Result Value Ref Range    Lactate Dehydrogenase 298 (H) 81 - 234 U/L   Partial thromboplastin time   Result Value Ref Range    PTT 40 (H) 22 - 37 sec   Phosphorus   Result Value Ref Range    Phosphorus 2.7 2.5 - 4.5 mg/dL   Uric acid   Result Value Ref Range    Uric Acid 4.2 2.6 - 6.0 mg/dL   Transfusion Rxn Blood Bank Notification   Result Value Ref Range    Transfusion Rxn Blood Bank Notification Order received    Magnesium   Result Value Ref Range    Magnesium 1.9 1.6 - 2.3 mg/dL   Glucose by meter   Result Value Ref Range    Glucose 103 (H) 70 - 99 mg/dL   Glucose by meter   Result Value Ref Range    Glucose 89 70 - 99 mg/dL   Lactic acid level STAT   Result Value Ref Range    Lactic Acid 10.5 (HH) 0.7 - 2.1 mmol/L

## 2017-07-28 NOTE — PROGRESS NOTES
Palliative Care Inpatient Clinical Social Work Visit    Patient Information:  Bree is a 56 year old woman with a complex medical history, including cardiac issues, and now with a new diagnosis of lymphoma.  Palliative Consult Team was consulted and is following.      Intervention:  I met with Bree and her  Wolf and sister Lorie in Bree's room this afternoon.  I introduced myself and my role on the Palliative Consult Service.  Bree was receptive to my involvement.  As she was visiting with family, we agreed that I will return next week to talk further with her and Wolf.     Plan:  Will follow up early next week.

## 2017-07-28 NOTE — PROGRESS NOTES
[facility], [place]    Sepsis Evaluation Progress Note    Date of Service: 07/28/2017    I was called to see Amelia Michel due to abnormal vital signs triggering the Sepsis SIRS screening alert. She is not known to have an infection.     This note is being placed as documentation of a response that occurred earlier in the day, directly to a notification I received at 10:15 AM.     Physical Exam    Vital Signs:  Temp: 99  F (37.2  C) Temp src: Axillary BP: 102/50 Pulse: 59 Heart Rate: 63 Resp: 19 SpO2: 97 % O2 Device: Nasal cannula Oxygen Delivery: 2 LPM    Lab:  Lactic Acid   Date Value Ref Range Status   07/28/2017 10.5 (HH) 0.7 - 2.1 mmol/L Final     Comment:     Critical Value called to and read back by  NATIVIDAD JIMENEZ RN 5C ON 07/28/17 AT 1044 BY AO         The patient is at baseline mental status.    The patient was being assessed by the provider team at time the lactic acid lab test was pending. Test of their physical exam is notable for clear lung sounds and regular heart rate/rhythm. Remainder of exam as per daily progress note.     Assessment and Plan    The SIRS and exam findings are likely due to active hematologic malignancy (DLBCL) and active reaction to rituximab therapy, there is no sign of sepsis at this time.    Disposition: The patient will remain on the current unit. We will continue to monitor this patient closely.    Kalpana Wren PA-C

## 2017-07-28 NOTE — PLAN OF CARE
Problem: Goal Outcome Summary  Goal: Goal Outcome Summary  1. Pt will have fevers well controlled  2. Pt will be hemodynamically stable   VSS with exception of temperature. [place] through blood transfusion patient had a temperature of 100.3. Stopped blood transfusion. Called hospitalist, charge RN, & blood bank. Telemetry continues. In sinus bradycardia at the beginning of shift with occasional PVCs. Currently in sinus rhythm--HR in low 60's. Incontinent of urine at times, but alert & oriented to notify when depends needed to be changed. Changed depends x3 overnight. 20 mEq of potassium given last evening & 20 mEq of potassium given this AM. Left arm extravasation wound dressing to be changed daily. Rituximab to start today at 0900. Continuous chemo to start at 1600 of etoposide & vincristine. Hemoglobin 8 this AM. Per blood bank needed to verify with Blood Bank MD before infusing another infusion of PRBCs--hold until verified by Blood Bank MD. Blood sugar this AM 67--gave two cups of juice. Blood sugar post-intervention 103. TLS & DIC monitoring labs Q8hrs. Continue to monitor.

## 2017-07-28 NOTE — PLAN OF CARE
Problem: Goal Outcome Summary  Goal: Goal Outcome Summary  1. Pt will have fevers well controlled  2. Pt will be hemodynamically stable   PT / 6C - Pt IND with bed mobility and sit<>stand transfers. Pt ambulates 100ft x 3 bouts with FWW + CGA-SBA + WC follow - pt needing seated rest breaks between all bouts 2/2 fatigue and decreased activity tolerance. Pt performed NuStep for 10 continuous minutes at Level 1, avg 50 SPM, OMNI 4. VSS on 1-2 L O2 during session.  REC - Discharge to ARU to maximize strength, balance, endurance, and mobility.

## 2017-07-28 NOTE — PLAN OF CARE
Problem: Goal Outcome Summary  Goal: Goal Outcome Summary  1. Pt will have fevers well controlled  2. Pt will be hemodynamically stable   Patient received rituxan today. Shortly after infusion started patient had SOB and hypotension. Infusion held, team notified and methylpred given. Restarted infusion and patient developed chills/fever. Demerol given, tylenol repeated when due and infusion was held again until symptoms resolved. Was able to restart infusion and increase by 50ml every 30 minutes and complete infusion. continuous chemo started this afternoon. Patient is receiving FFP currently and will need RBCs. Also lasix drip will start when PIV is placed. Torsemide held this morning due to low BPs with rituxan per CRISTA Acuna. Patient is incontinent of urine. Is on tele and has been sinus [place] with some PVCs. 2L nasal cannula as needed.    Problem: Chemotherapy Effects (Adult)  Goal: Signs and Symptoms of Listed Potential Problems Will be Absent or Manageable (Chemotherapy Effects)  Signs and symptoms of listed potential problems will be absent or manageable by discharge/transition of care (reference Chemotherapy Effects (Adult) CPG).     07/28/17 1822   Chemotherapy Effects   Problems Assessed (Chemotherapy Effects) all   Problems Present (Chemotherapy Effects) hypersensitivity reaction;nausea and vomiting

## 2017-07-28 NOTE — PROGRESS NOTES
"        Cardiology Daily Note     Patient: Amelia Michel  MRN: [id number]  Admission Date: 6/19/2017  Hospital Day # 39       Assessment and Recommendations:  Amelia Michel is a 56 year old female with hx VSD s/p repair, RA, pAF/flutter/SVT c/b OOH arrest with ongoing anasarca and FUO most likely 2/2 to diffuse B cell lymphoma base PET and marrow biopsy.     -change diuretic to lasix gtt at 5 mg/hr as patient getting large amount of fluid with chemotherapy induction and can avoid bolus dosing that may be seen with BID dosing  -no change in other meds  -continue to monitor fluid status closely given increased fluid load with chemotherapy  -we will continue to follow    Thank you for the opportunity to participate in the care of Ms. Michel. Please call cardiology service with any questions      Patient was discussed and evaluated with Dr. Gandara.    Daphne Ralph MD  Internal Medicine PGY1  Pager: [phone]    _______________________________________________________________    Subjective & Interval History:   -nursing notes reviewed  -chart reviewed  -stable o/n with plan to begin chemotherapy this AM  -AM dose of torsemide not given due to concern for hypotension this AM in setting of rituxan therapy. BP responded appropriately  -patient feels legs subjectively more swollen today    Medications: Reviewed in EPIC. List below for reference    Physical Exam:    Blood pressure 102/50, pulse 59, temperature 99  F (37.2  C), temperature source Axillary, resp. rate 19, height 1.455 m (4' 9.28\"), weight 67.5 kg (148 lb 13 oz), SpO2 97 %, not currently breastfeeding.    General: Lying in bed with facemask in place. No acute distress.   Resp: Diminished breath sounds throughout with crackles at the bases  CV: Regular rate and rhythm. S1 and S2. No murmurs, rubs nor gallops   Abdominal: Soft, nondistended. Nontender to palpation. No peritoneal signs. BS+  MSK: Wraps on legs in place. trace edema of the bilateral " LE  Neurological: CN's II-XII grossly intact. Alert and oriented completely.    Labs & Studies of Note: Reviewed in Epic    CXR 7/27  IMPRESSION:  1. Satisfactory appearance of right-sided PICC. This should be  available for use within limits of this single frontal radiograph.  2. Persistent layering right effusion and associated atelectasis.    Cardiac MRI 7/24  CONCLUSIONS: Normal Bi-Ventricular systolic function, LVEF 64% and RVE 59%. There is no evidence of infiltrative disease. Compared to the MRI from 5/15/2017, there is no significant change.     PET-Scan  IMPRESSION: In this patient with history of abnormal bone marrow  biopsy and clinical suspicion of lymphoma;      1. Findings suggesting extensive lymphoma including;    a. Multiple hypermetabolic mediastinal and single left neck level 2A  lymphadenopathy.    b. Extensive FDG avid bony lesions throughout the axial and  appendicular skeleton. No bone fracture or spinal canal involvement.     c. Numerous hepatic FDG avid lesions.    d. Findings suspicious for cardiac involvement as evidenced by  thickening of the interatrial septum with associated increased FDG  uptake. This can be further evaluated with echocardiogram.    e. FDG avid lesion in the pelvis contiguous with the uterus,  suspicious for uterine involvement.  2. Stable bilateral pleural effusions with associated compression  atelectasis.  3. Mild to moderate ascites. Diffuse soft tissue anasarca.  4. Splenomegaly without associated FDG uptake.    CMP    Recent Labs  Lab 07/28/17  1510 07/28/17  0333 07/27/17  1748 07/27/17  0456 07/26/17  1803  07/24/17  0653  07/23/17  0650   * 132* 133 131* 133  < > 134  --  134   POTASSIUM 4.3 3.7 3.6 3.4 3.6  < > 3.7  < > 3.3*   CHLORIDE 92* 90* 92* 90* 94  < > 96  --  98   CO2 24 28 27 27 28  < > 22  --  26   ANIONGAP 17* 15* 14 15* 12  < > 15*  --  10   * 67* 128* 77 116*  < > 86  --  86   BUN 40* 31* 31* 29 24  < > 27  --  25   CR 0.82 0.76 0.65  0.70 0.60  < > 0.71  --  0.52   GFRESTIMATED 72 79 >90Non  GFR Calc 86 >90Non  GFR Calc  < > 85  --  >90Non  GFR Calc   GFRESTBLACK 88 >90African American GFR Calc >90African American GFR Calc >90African American GFR Calc >90African American GFR Calc  < > >90African American GFR Calc  --  >90African American GFR Calc   VIKTORIYA 8.3* 8.7 8.4* 8.9 9.1  < > 8.6  --  8.6   MAG  --  1.9  --  2.4* 1.5*  --  2.0  --  2.0   PHOS 4.0 2.7 3.1 2.5 2.7  --   --   --  2.8   PROTTOTAL  --   --   --   --   --   --   --   --  5.4*   ALBUMIN  --   --   --   --   --   --   --   --  2.5*   BILITOTAL  --  2.4*  --   --   --   --   --   --  1.2   ALKPHOS  --   --   --   --   --   --   --   --  202*   AST  --  82*  --   --   --   --   --   --  50*   ALT  --  40  --   --   --   --   --   --  33   < > = values in this interval not displayed.    CBC    Recent Labs  Lab 07/28/17  1510 07/28/17  0333 07/27/17 1748 07/27/17  0456   WBC 2.8* 2.4* 2.4* 2.0*   RBC 2.40* 2.50* 2.34* 2.60*   HGB 7.5* 8.0* 7.4* 8.1*   HCT 23.3* 24.6* 22.9* 25.4*   PLT 22* 26* 28* 31*     INR    Recent Labs  Lab 07/28/17  1510 07/28/17  0333 07/27/17 1748 07/27/17  0456   INR 1.82* 1.42* 1.57* 1.53*     Medication List for Reference (delete if desired)  Current Facility-Administered Medications   Medication     melatonin tablet 1-3 mg     etoposide (TOPOSAR) 40 mg in NaCl 0.9 % 552 mL CHEMOTHERAPY     phytonadione (MEPHYTON) tablet 10 mg     sennosides (SENOKOT) tablet 2-3 tablet     polyethylene glycol (MIRALAX/GLYCOLAX) Packet 17 g     allopurinol (ZYLOPRIM) tablet 300 mg     acyclovir (ZOVIRAX) tablet 400 mg     fluconazole (DIFLUCAN) tablet 200 mg     levofloxacin (LEVAQUIN) tablet 250 mg     sodium chloride (PF) 0.9% PF flush 5-50 mL     heparin lock flush 10 UNIT/ML injection 2-5 mL     ondansetron (ZOFRAN) tablet 16 mg     [START ON 8/1/2017] fosaprepitant (EMEND) 150 mg in NaCl 0.9 % intermittent infusion      [START ON 8/2/2017] dexamethasone (DECADRON) tablet 8 mg     predniSONE (DELTASONE) tablet 50 mg     Chemotherapy Infusing-Continuous Infusion     [START ON 8/2/2017] filgrastim 15 mcg/mL (in Dextrose) (NEUPOGEN) infusion 350 mcg     prochlorperazine (COMPAZINE) tablet 10 mg     prochlorperazine (COMPAZINE) injection 10 mg     LORazepam (ATIVAN) tablet 0.5-1 mg     LORazepam (ATIVAN) injection 0.5-1 mg     MEDICATION INSTRUCTION     methylPREDNISolone sodium succinate (solu-MEDROL) injection 125 mg     diphenhydrAMINE (BENADRYL) injection 50 mg     meperidine (DEMEROL) injection 25 mg     EPINEPHrine (ADRENALIN) injection 0.3 mg     albuterol (PROAIR HFA/PROVENTIL HFA/VENTOLIN HFA) Inhaler 1-2 puff     albuterol neb solution 2.5 mg     0.9% sodium chloride infusion     vinCRIStine (ONCOVIN) 0.66 mg, DOXOrubicin (ADRIAMYCIN) 17 mg in NaCl 0.9 % 1,059 mL CHEMOTHERAPY     [START ON 8/1/2017] cyclophosphamide (CYTOXAN) 1,240 mg in NaCl 0.9 % 612 mL CHEMOTHERAPY     sodium chloride (PF) 0.9% PF flush 10-20 mL     sodium chloride (PF) 0.9% PF flush 10 mL     potassium chloride SA (K-DUR/KLOR-CON M) CR tablet 20-40 mEq     potassium chloride (KLOR-CON) Packet 20-40 mEq     potassium chloride 10 mEq in 100 mL intermittent infusion     potassium chloride 10 mEq in 100 mL intermittent infusion with 10 mg lidocaine     potassium chloride 20 mEq in 50 mL intermittent infusion     [START ON 8/4/2017] predniSONE (DELTASONE) tablet 5 mg     torsemide (DEMADEX) tablet 40 mg     LORazepam (ATIVAN) tablet 1 mg     traZODone (DESYREL) quarter-tab 12.5 mg     morphine 0.1% in solosite topical gel 2 g     sodium chloride (PF) 0.9% PF flush 1-74 mL     acetaminophen (TYLENOL) tablet 650 mg     thiamine tablet 50 mg     folic acid (FOLVITE) tablet 1 mg     atenolol (TENORMIN) tablet 50 mg     midodrine (PROAMATINE) tablet 10 mg     miconazole (MICATIN; MICRO GUARD) 2 % powder     pantoprazole (PROTONIX) EC tablet 40 mg     digoxin  (LANOXIN) tablet 125 mcg     ondansetron (ZOFRAN) tablet 4 mg     ondansetron (ZOFRAN) injection 4 mg     simethicone (MYLICON) chewable tablet 80 mg     sodium chloride (PF) 0.9% PF flush 10-20 mL     sodium chloride (PF) 0.9% PF flush 10 mL     heparin lock flush 10 UNIT/ML injection 5-10 mL     dextrose 10 % 1,000 mL infusion     gabapentin (NEURONTIN) capsule 200 mg     multivitamin, therapeutic with minerals (THERA-VIT-M) tablet 1 tablet     sodium chloride (PF) 0.9% PF flush 3 mL     medication instruction     glucose 40 % gel 15-30 g    Or     dextrose 50 % injection 25-50 mL    Or     glucagon injection 1 mg     naloxone (NARCAN) injection 0.1-0.4 mg     calcium-vitamin D (CALTRATE) 600-400 MG-UNIT per tablet 2 tablet     morphine 0.1% in solosite topical gel     magnesium sulfate 2 g in NS intermittent infusion ([facility] or [facility])     magnesium sulfate 4 g in 100 mL sterile water (premade)     sodium phosphate 10 mmol in D5W intermittent infusion     sodium phosphate 15 mmol in D5W intermittent infusion     sodium phosphate 20 mmol in D5W intermittent infusion     sodium phosphate 25 mmol in D5W intermittent infusion     traMADol (ULTRAM) half-tab 25 mg     I interviewed and examined the patient with the house staff.  I agree with the assessment and plan as documented.      Selvin Gandara MD, PhD  Professor of Medicine  Division of Cardiology

## 2017-07-28 NOTE — PROVIDER NOTIFICATION
07/28/17 1000   Call Information   Date of Call 07/28/17   Time of Call 1044   Name of person requesting the team Lauren   Title of person requesting team RN   RRT Arrival time 1046   Time RRT ended 1102   Reason for call   Type of RRT Adult   Primary reason for call Sepsis suspected   Sepsis Suspected Elevated Lactate level   Was patient transferred from the ED, ICU, or PACU within last 24 hours prior to RRT call? No   SBAR   Situation Lactic 10.5   Background Newly diagnosed B-cell lymphoma   Notable History/Conditions Cancer;Cardiac   Assessment Temp 99.6, VSS. Denies SOB. No pt complaints. Pt receiving prednisone for chemo premedication.    Interventions No interventions   Patient Outcome   Patient Outcome Stabilized on unit   RRT Team   Date Attending Physician notified 07/28/17   Time Attending Physician notified 1044   Physician(s) Kalpana Wren PA-C   Lead RN Mildred Ramirez   Post RRT Intervention Assessment   Post RRT Assessment Stable/Improved   Date Follow Up Done 07/28/17   Time Follow Up Done 1313   Comments VSS. Continues to spike fevers. Otherwise pt's status remains unchanged.

## 2017-07-28 NOTE — PLAN OF CARE
Problem: Goal Outcome Summary  Goal: Goal Outcome Summary  1. Pt will have fevers well controlled  2. Pt will be hemodynamically stable   PT 5C: patient reports feeling too tired to participate with PT this day.

## 2017-07-29 ENCOUNTER — APPOINTMENT (OUTPATIENT)
Dept: PHYSICAL THERAPY | Facility: CLINIC | Age: 57
DRG: 871 | End: 2017-07-29
Attending: INTERNAL MEDICINE
Payer: COMMERCIAL

## 2017-07-29 LAB
ABO + RH BLD: NORMAL
ABO + RH BLD: NORMAL
ALBUMIN SERPL-MCNC: 3 G/DL (ref 3.4–5)
ALP SERPL-CCNC: 318 U/L (ref 40–150)
ALT SERPL W P-5'-P-CCNC: 40 U/L (ref 0–50)
ANION GAP SERPL CALCULATED.3IONS-SCNC: 8 MMOL/L (ref 3–14)
ANION GAP SERPL CALCULATED.3IONS-SCNC: 9 MMOL/L (ref 3–14)
APTT PPP: 31 SEC (ref 22–37)
APTT PPP: 33 SEC (ref 22–37)
AST SERPL W P-5'-P-CCNC: 133 U/L (ref 0–45)
BACTERIA SPEC CULT: NO GROWTH
BACTERIA SPEC CULT: NO GROWTH
BASOPHILS # BLD AUTO: 0 10E9/L (ref 0–0.2)
BASOPHILS # BLD AUTO: 0 10E9/L (ref 0–0.2)
BASOPHILS NFR BLD AUTO: 0 %
BASOPHILS NFR BLD AUTO: 0.4 %
BILIRUB DIRECT SERPL-MCNC: 1.6 MG/DL (ref 0–0.2)
BILIRUB SERPL-MCNC: 2.7 MG/DL (ref 0.2–1.3)
BLD GP AB SCN SERPL QL: NORMAL
BLD PROD TYP BPU: NORMAL
BLOOD BANK CMNT PATIENT-IMP: NORMAL
BUN SERPL-MCNC: 34 MG/DL (ref 7–30)
BUN SERPL-MCNC: 35 MG/DL (ref 7–30)
CALCIUM SERPL-MCNC: 8 MG/DL (ref 8.5–10.1)
CALCIUM SERPL-MCNC: 8.4 MG/DL (ref 8.5–10.1)
CHLORIDE SERPL-SCNC: 93 MMOL/L (ref 94–109)
CHLORIDE SERPL-SCNC: 94 MMOL/L (ref 94–109)
CO2 SERPL-SCNC: 34 MMOL/L (ref 20–32)
CO2 SERPL-SCNC: 35 MMOL/L (ref 20–32)
CREAT SERPL-MCNC: 0.62 MG/DL (ref 0.52–1.04)
CREAT SERPL-MCNC: 0.65 MG/DL (ref 0.52–1.04)
DIFFERENTIAL METHOD BLD: ABNORMAL
DIFFERENTIAL METHOD BLD: ABNORMAL
EOSINOPHIL # BLD AUTO: 0 10E9/L (ref 0–0.7)
EOSINOPHIL # BLD AUTO: 0 10E9/L (ref 0–0.7)
EOSINOPHIL NFR BLD AUTO: 0 %
EOSINOPHIL NFR BLD AUTO: 0 %
ERYTHROCYTE [DISTWIDTH] IN BLOOD BY AUTOMATED COUNT: 27.8 % (ref 10–15)
ERYTHROCYTE [DISTWIDTH] IN BLOOD BY AUTOMATED COUNT: 28.4 % (ref 10–15)
FIBRINOGEN PPP-MCNC: 314 MG/DL (ref 200–420)
FIBRINOGEN PPP-MCNC: 357 MG/DL (ref 200–420)
GFR SERPL CREATININE-BSD FRML MDRD: ABNORMAL ML/MIN/1.7M2
GFR SERPL CREATININE-BSD FRML MDRD: ABNORMAL ML/MIN/1.7M2
GLUCOSE BLDC GLUCOMTR-MCNC: 196 MG/DL (ref 70–99)
GLUCOSE BLDC GLUCOMTR-MCNC: 227 MG/DL (ref 70–99)
GLUCOSE BLDC GLUCOMTR-MCNC: 263 MG/DL (ref 70–99)
GLUCOSE BLDC GLUCOMTR-MCNC: 279 MG/DL (ref 70–99)
GLUCOSE BLDC GLUCOMTR-MCNC: 311 MG/DL (ref 70–99)
GLUCOSE SERPL-MCNC: 225 MG/DL (ref 70–99)
GLUCOSE SERPL-MCNC: 239 MG/DL (ref 70–99)
HCT VFR BLD AUTO: 27.3 % (ref 35–47)
HCT VFR BLD AUTO: 27.9 % (ref 35–47)
HGB BLD-MCNC: 9 G/DL (ref 11.7–15.7)
HGB BLD-MCNC: 9.1 G/DL (ref 11.7–15.7)
IMM GRANULOCYTES # BLD: 0 10E9/L (ref 0–0.4)
IMM GRANULOCYTES # BLD: 0 10E9/L (ref 0–0.4)
IMM GRANULOCYTES NFR BLD: 0.4 %
IMM GRANULOCYTES NFR BLD: 0.6 %
INR PPP: 1.39 (ref 0.86–1.14)
INR PPP: 1.58 (ref 0.86–1.14)
LDH SERPL L TO P-CCNC: 608 U/L (ref 81–234)
LDH SERPL L TO P-CCNC: 964 U/L (ref 81–234)
LYMPHOCYTES # BLD AUTO: 0.4 10E9/L (ref 0.8–5.3)
LYMPHOCYTES # BLD AUTO: 0.6 10E9/L (ref 0.8–5.3)
LYMPHOCYTES NFR BLD AUTO: 15.4 %
LYMPHOCYTES NFR BLD AUTO: 16.2 %
MAGNESIUM SERPL-MCNC: 1.7 MG/DL (ref 1.6–2.3)
MAGNESIUM SERPL-MCNC: 1.9 MG/DL (ref 1.6–2.3)
MCH RBC QN AUTO: 30.5 PG (ref 26.5–33)
MCH RBC QN AUTO: 30.5 PG (ref 26.5–33)
MCHC RBC AUTO-ENTMCNC: 32.6 G/DL (ref 31.5–36.5)
MCHC RBC AUTO-ENTMCNC: 33 G/DL (ref 31.5–36.5)
MCV RBC AUTO: 93 FL (ref 78–100)
MCV RBC AUTO: 94 FL (ref 78–100)
MICRO REPORT STATUS: NORMAL
MICRO REPORT STATUS: NORMAL
MONOCYTES # BLD AUTO: 0.4 10E9/L (ref 0–1.3)
MONOCYTES # BLD AUTO: 0.4 10E9/L (ref 0–1.3)
MONOCYTES NFR BLD AUTO: 10.9 %
MONOCYTES NFR BLD AUTO: 14.3 %
NEUTROPHILS # BLD AUTO: 2 10E9/L (ref 1.6–8.3)
NEUTROPHILS # BLD AUTO: 2.5 10E9/L (ref 1.6–8.3)
NEUTROPHILS NFR BLD AUTO: 69.5 %
NEUTROPHILS NFR BLD AUTO: 72.3 %
NRBC # BLD AUTO: 0 10*3/UL
NRBC # BLD AUTO: 0 10*3/UL
NRBC BLD AUTO-RTO: 0 /100
NRBC BLD AUTO-RTO: 0 /100
NUM BPU REQUESTED: 2
PHOSPHATE SERPL-MCNC: 2.3 MG/DL (ref 2.5–4.5)
PHOSPHATE SERPL-MCNC: 3.9 MG/DL (ref 2.5–4.5)
PLATELET # BLD AUTO: 15 10E9/L (ref 150–450)
PLATELET # BLD AUTO: 22 10E9/L (ref 150–450)
PLATELET # BLD EST: ABNORMAL 10*3/UL
POTASSIUM SERPL-SCNC: 3 MMOL/L (ref 3.4–5.3)
POTASSIUM SERPL-SCNC: 3.1 MMOL/L (ref 3.4–5.3)
POTASSIUM SERPL-SCNC: 3.2 MMOL/L (ref 3.4–5.3)
POTASSIUM SERPL-SCNC: 3.3 MMOL/L (ref 3.4–5.3)
PROT SERPL-MCNC: 6.3 G/DL (ref 6.8–8.8)
RBC # BLD AUTO: 2.95 10E12/L (ref 3.8–5.2)
RBC # BLD AUTO: 2.98 10E12/L (ref 3.8–5.2)
SODIUM SERPL-SCNC: 137 MMOL/L (ref 133–144)
SODIUM SERPL-SCNC: 137 MMOL/L (ref 133–144)
SPECIMEN EXP DATE BLD: NORMAL
SPECIMEN SOURCE: NORMAL
SPECIMEN SOURCE: NORMAL
URATE SERPL-MCNC: 2.4 MG/DL (ref 2.6–6)
URATE SERPL-MCNC: 2.4 MG/DL (ref 2.6–6)
WBC # BLD AUTO: 2.8 10E9/L (ref 4–11)
WBC # BLD AUTO: 3.4 10E9/L (ref 4–11)

## 2017-07-29 PROCEDURE — 25000132 ZZH RX MED GY IP 250 OP 250 PS 637: Performed by: INTERNAL MEDICINE

## 2017-07-29 PROCEDURE — 25000132 ZZH RX MED GY IP 250 OP 250 PS 637: Performed by: NURSE PRACTITIONER

## 2017-07-29 PROCEDURE — 84550 ASSAY OF BLOOD/URIC ACID: CPT | Performed by: INTERNAL MEDICINE

## 2017-07-29 PROCEDURE — 25000128 H RX IP 250 OP 636: Performed by: INTERNAL MEDICINE

## 2017-07-29 PROCEDURE — 84132 ASSAY OF SERUM POTASSIUM: CPT | Performed by: PHYSICAL MEDICINE & REHABILITATION

## 2017-07-29 PROCEDURE — 25000125 ZZHC RX 250: Performed by: INTERNAL MEDICINE

## 2017-07-29 PROCEDURE — 93005 ELECTROCARDIOGRAM TRACING: CPT

## 2017-07-29 PROCEDURE — 25000132 ZZH RX MED GY IP 250 OP 250 PS 637: Performed by: PHYSICIAN ASSISTANT

## 2017-07-29 PROCEDURE — 85384 FIBRINOGEN ACTIVITY: CPT | Performed by: INTERNAL MEDICINE

## 2017-07-29 PROCEDURE — 85730 THROMBOPLASTIN TIME PARTIAL: CPT | Performed by: INTERNAL MEDICINE

## 2017-07-29 PROCEDURE — 83735 ASSAY OF MAGNESIUM: CPT | Performed by: INTERNAL MEDICINE

## 2017-07-29 PROCEDURE — 93010 ELECTROCARDIOGRAM REPORT: CPT | Performed by: INTERNAL MEDICINE

## 2017-07-29 PROCEDURE — 25000132 ZZH RX MED GY IP 250 OP 250 PS 637: Performed by: STUDENT IN AN ORGANIZED HEALTH CARE EDUCATION/TRAINING PROGRAM

## 2017-07-29 PROCEDURE — 85025 COMPLETE CBC W/AUTO DIFF WBC: CPT | Performed by: INTERNAL MEDICINE

## 2017-07-29 PROCEDURE — 25000131 ZZH RX MED GY IP 250 OP 636 PS 637: Performed by: INTERNAL MEDICINE

## 2017-07-29 PROCEDURE — 85610 PROTHROMBIN TIME: CPT | Performed by: INTERNAL MEDICINE

## 2017-07-29 PROCEDURE — 80048 BASIC METABOLIC PNL TOTAL CA: CPT | Performed by: INTERNAL MEDICINE

## 2017-07-29 PROCEDURE — 80076 HEPATIC FUNCTION PANEL: CPT | Performed by: INTERNAL MEDICINE

## 2017-07-29 PROCEDURE — 97116 GAIT TRAINING THERAPY: CPT | Mod: GP

## 2017-07-29 PROCEDURE — 25000132 ZZH RX MED GY IP 250 OP 250 PS 637

## 2017-07-29 PROCEDURE — 97110 THERAPEUTIC EXERCISES: CPT | Mod: GP

## 2017-07-29 PROCEDURE — 25000128 H RX IP 250 OP 636: Performed by: PHYSICIAN ASSISTANT

## 2017-07-29 PROCEDURE — 83615 LACTATE (LD) (LDH) ENZYME: CPT | Performed by: INTERNAL MEDICINE

## 2017-07-29 PROCEDURE — 25000131 ZZH RX MED GY IP 250 OP 636 PS 637: Performed by: PHYSICIAN ASSISTANT

## 2017-07-29 PROCEDURE — 84100 ASSAY OF PHOSPHORUS: CPT | Performed by: INTERNAL MEDICINE

## 2017-07-29 PROCEDURE — 20000002 ZZH R&B BMT INTERMEDIATE

## 2017-07-29 PROCEDURE — 00000146 ZZHCL STATISTIC GLUCOSE BY METER IP

## 2017-07-29 PROCEDURE — 40000193 ZZH STATISTIC PT WARD VISIT

## 2017-07-29 PROCEDURE — 25000132 ZZH RX MED GY IP 250 OP 250 PS 637: Performed by: FAMILY MEDICINE

## 2017-07-29 RX ORDER — ATENOLOL 25 MG/1
25 TABLET ORAL 2 TIMES DAILY
Status: DISCONTINUED | OUTPATIENT
Start: 2017-07-29 | End: 2017-07-29

## 2017-07-29 RX ORDER — NICOTINE POLACRILEX 4 MG
15-30 LOZENGE BUCCAL
Status: DISCONTINUED | OUTPATIENT
Start: 2017-07-29 | End: 2017-08-04 | Stop reason: HOSPADM

## 2017-07-29 RX ORDER — POTASSIUM CHLORIDE 1.5 G/1.58G
20 POWDER, FOR SOLUTION ORAL 2 TIMES DAILY
Status: DISCONTINUED | OUTPATIENT
Start: 2017-07-29 | End: 2017-07-30

## 2017-07-29 RX ORDER — DEXTROSE MONOHYDRATE 25 G/50ML
25-50 INJECTION, SOLUTION INTRAVENOUS
Status: DISCONTINUED | OUTPATIENT
Start: 2017-07-29 | End: 2017-08-04 | Stop reason: HOSPADM

## 2017-07-29 RX ORDER — ATENOLOL 25 MG/1
25 TABLET ORAL DAILY
Status: DISCONTINUED | OUTPATIENT
Start: 2017-07-30 | End: 2017-08-04 | Stop reason: HOSPADM

## 2017-07-29 RX ORDER — POTASSIUM CHLORIDE 750 MG/1
20 TABLET, EXTENDED RELEASE ORAL 2 TIMES DAILY
Status: DISCONTINUED | OUTPATIENT
Start: 2017-07-29 | End: 2017-08-01

## 2017-07-29 RX ORDER — SENNOSIDES 8.6 MG
1-2 TABLET ORAL 2 TIMES DAILY PRN
Status: DISCONTINUED | OUTPATIENT
Start: 2017-07-29 | End: 2017-08-04 | Stop reason: HOSPADM

## 2017-07-29 RX ADMIN — ACYCLOVIR 400 MG: 400 TABLET ORAL at 08:16

## 2017-07-29 RX ADMIN — POTASSIUM CHLORIDE 40 MEQ: 750 TABLET, EXTENDED RELEASE ORAL at 17:55

## 2017-07-29 RX ADMIN — MULTIPLE VITAMINS W/ MINERALS TAB 1 TABLET: TAB at 08:14

## 2017-07-29 RX ADMIN — PREDNISONE 50 MG: 50 TABLET ORAL at 08:17

## 2017-07-29 RX ADMIN — FOLIC ACID 1 MG: 1 TABLET ORAL at 08:16

## 2017-07-29 RX ADMIN — MIDODRINE HYDROCHLORIDE 10 MG: 5 TABLET ORAL at 12:42

## 2017-07-29 RX ADMIN — LEVOFLOXACIN 250 MG: 250 TABLET, FILM COATED ORAL at 12:42

## 2017-07-29 RX ADMIN — POTASSIUM CHLORIDE 20 MEQ: 750 TABLET, EXTENDED RELEASE ORAL at 20:27

## 2017-07-29 RX ADMIN — FUROSEMIDE 5 MG/HR: 10 INJECTION, SOLUTION INTRAVENOUS at 15:15

## 2017-07-29 RX ADMIN — ACYCLOVIR 400 MG: 400 TABLET ORAL at 20:28

## 2017-07-29 RX ADMIN — POTASSIUM CHLORIDE 20 MEQ: 29.8 INJECTION, SOLUTION INTRAVENOUS at 05:21

## 2017-07-29 RX ADMIN — MICONAZOLE NITRATE: 2 POWDER TOPICAL at 20:36

## 2017-07-29 RX ADMIN — ETOPOSIDE 40 MG: 20 INJECTION, SOLUTION, CONCENTRATE INTRAVENOUS at 15:46

## 2017-07-29 RX ADMIN — THIAMINE HCL (VITAMIN B1) 50 MG TABLET 50 MG: at 08:14

## 2017-07-29 RX ADMIN — GABAPENTIN 200 MG: 100 CAPSULE ORAL at 20:27

## 2017-07-29 RX ADMIN — Medication 2 G: at 14:35

## 2017-07-29 RX ADMIN — INSULIN ASPART 3 UNITS: 100 INJECTION, SOLUTION INTRAVENOUS; SUBCUTANEOUS at 12:42

## 2017-07-29 RX ADMIN — ATENOLOL 25 MG: 25 TABLET ORAL at 10:36

## 2017-07-29 RX ADMIN — ONDANSETRON HYDROCHLORIDE 16 MG: 8 TABLET, FILM COATED ORAL at 15:51

## 2017-07-29 RX ADMIN — POTASSIUM CHLORIDE 20 MEQ: 750 TABLET, EXTENDED RELEASE ORAL at 20:28

## 2017-07-29 RX ADMIN — SODIUM PHOSPHATE, MONOBASIC, MONOHYDRATE AND SODIUM PHOSPHATE, DIBASIC, ANHYDROUS 15 MMOL: 276; 142 INJECTION, SOLUTION INTRAVENOUS at 20:27

## 2017-07-29 RX ADMIN — ATENOLOL 25 MG: 25 TABLET ORAL at 20:36

## 2017-07-29 RX ADMIN — POTASSIUM CHLORIDE 20 MEQ: 750 TABLET, EXTENDED RELEASE ORAL at 09:17

## 2017-07-29 RX ADMIN — PANTOPRAZOLE SODIUM 40 MG: 40 TABLET, DELAYED RELEASE ORAL at 06:04

## 2017-07-29 RX ADMIN — PHYTONADIONE 10 MG: 5 TABLET ORAL at 08:14

## 2017-07-29 RX ADMIN — FLUCONAZOLE 200 MG: 200 TABLET ORAL at 08:16

## 2017-07-29 RX ADMIN — DOXORUBICIN HYDROCHLORIDE 0.66 MG: 2 INJECTION, SOLUTION INTRAVENOUS at 16:58

## 2017-07-29 RX ADMIN — GABAPENTIN 200 MG: 100 CAPSULE ORAL at 08:14

## 2017-07-29 RX ADMIN — POTASSIUM CHLORIDE 40 MEQ: 750 TABLET, EXTENDED RELEASE ORAL at 13:05

## 2017-07-29 RX ADMIN — MICONAZOLE NITRATE: 2 POWDER TOPICAL at 08:17

## 2017-07-29 RX ADMIN — ALLOPURINOL 300 MG: 300 TABLET ORAL at 08:16

## 2017-07-29 RX ADMIN — DIGOXIN 125 MCG: 125 TABLET ORAL at 09:39

## 2017-07-29 RX ADMIN — CALCIUM CARBONATE 600 MG (1,500 MG)-VITAMIN D3 400 UNIT TABLET 2 TABLET: at 08:16

## 2017-07-29 RX ADMIN — PREDNISONE 50 MG: 50 TABLET ORAL at 15:51

## 2017-07-29 RX ADMIN — Medication 2 G: at 17:56

## 2017-07-29 RX ADMIN — POTASSIUM CHLORIDE 20 MEQ: 750 TABLET, EXTENDED RELEASE ORAL at 15:07

## 2017-07-29 RX ADMIN — GABAPENTIN 200 MG: 100 CAPSULE ORAL at 14:32

## 2017-07-29 RX ADMIN — INSULIN ASPART 3 UNITS: 100 INJECTION, SOLUTION INTRAVENOUS; SUBCUTANEOUS at 18:23

## 2017-07-29 RX ADMIN — INSULIN ASPART 4 UNITS: 100 INJECTION, SOLUTION INTRAVENOUS; SUBCUTANEOUS at 10:35

## 2017-07-29 RX ADMIN — MIDODRINE HYDROCHLORIDE 10 MG: 5 TABLET ORAL at 08:17

## 2017-07-29 RX ADMIN — POTASSIUM CHLORIDE 20 MEQ: 29.8 INJECTION, SOLUTION INTRAVENOUS at 03:39

## 2017-07-29 RX ADMIN — MIDODRINE HYDROCHLORIDE 10 MG: 5 TABLET ORAL at 17:55

## 2017-07-29 NOTE — PROGRESS NOTES
[facility], [place]    Hematology / Oncology Progress Note    Date of Service (when I saw the patient): 07/29/2017     Assessment & Plan   Amelia Michel is a 56 year old female who was admitted on 6/19/2017. She has complex past medical history of prior VSD repair, rheumatoid arthritis on long-term methotrexate, recent dx atrial fibrillation/flutter/SVT who was recently admitted to [facility] on 5/29 after an out of hospital cardiac arrest, admission complicated by intubation with discharge to TCU. Was discharged to TCU and subsequently readmitted to Cardiology service for FUO. With workup of fever and progressive cytopenias, diagnosis of DLBCL was made. She was transferred to the Hematology service on 7/26. See discussions of diagnosis and reasoning for treatment plan in 7/27 Heme/Onc note. Started Cycle 1 R-EPOCH on 7/28.     HEME:    #DLBCL. Stage 4 with bone marrow and liver involvement. IPI 4. Possibly related to prior methotrexate.   - PET/CT 7/18 revealing for mediastinal and neck level 2A lymphadenopathy, extensive FDG-avid bony lesions, hepatosplenic FDG-avid lesions, pelvic lesions contingous with the uterus, and bilateral pleural effusions.    - Bone marrow biopsy 7/12: scattered plasma cells in perivascular clusters, with no definitive staining of B-cell population by CD5; on re-review, possibly consistent with intravascularge large B-cell lymphoma. However, liver biopsy (7/21) consistent with DLBCL, negative for BCL2/BCL6/MYC rearrangements, CD20 positive.   - PICC line placed    Treatment Plan: DA-EPOCH-R Cycle 1 (Day 1=7/28/17)  - reaction to rituxan (hypotension with BP 87/45, SOB/feeling of ?throat swelling/difficulty getting breath in; then fever/rigors). No stridor. No rashes or hives. Received rescue methylpred.  - Etoposide and Vincristine/Doxorubicin initiated after completion of Rituxan  - Prednisone 50mg BID x 5 days    #Hyperuricemia  #Monitor for TLS.  Uric acid up  to 8.2 (7/27 PM), s/p Rasburicase. Uric acid improved to 4.2. Continue IVFs and Allopurinol. BID TLS labs at this time.     #DIC. INR prolonged -- increased to 1.82 (7/28 PM); s/p plasma support. PTT mildly elevated, Fibrinogen currently ok.   - conditional transfusion parameters in place: 2U plasma for INR >1.8, 5U cryo for fibrinogen <150.   - started Vitamin K 10mg PO x 3 days (7/28-7/30)  - monitor coags BID    #Pancytopenia, secondary to DLBCL  - transfuse to maintain Hgb >8.0 (keep given recent cardiac history), platelets >10,000    ID:   #PPX  - continue ppx ACV, Levaquin, Fluconazole     #Persistent Fevers.  #Lactic acidosis.   Previously followed by ID, who signed off 7/21.  Extensive infectious evaluation including blood cultures, which remain NGTD. Additionally, stool virus panel, M. Tuberculsosis, tick-borne illnesses, and bartonella/Q-fever have all been negative. Fevers/lactate secondary to malignancy.   - possible pneumonia while recently on meropenem (7/1-7/5);  CT chest (7/13) did reveal a right lung tree-in-bud opacities with more consolidative opacities in RLL; but no accompanying focal symptoms. Appears to have received dose of Meropenem on 7/14, now off.   - lactic acid increased to 10.4 (7/28) during Rituxan infusion reaction.   - cultures have been NGTD, avoid additional bolus fluids given hypervolemia, monitor closely    CV/PV:    #Atrial fibrillation/flutter   #Recent out of hospital cardiac arrest (5/29/17)  #Prior VSD repair (1969)  - PET scan suggested FDG-avid lesions leading to thickening of the interatrial septum, extending superiorly along the main pulmonary artery; although cardiac MRI 7/24 shows no evidence of infiltrative disease    - Appreciate Cardiology team recs  - continue Digoxin 125mcg daily. Will check digoxin level with next AM labs.   - continue Atenolol, but will decrease from 50mg bid to 25mg bid given bradycardia overnight. Note, per pt/, metoprolol has not  "worked for her and makes her feel poorly.  PT also notes that she does NOT want amiodarone, as there is concern that this led to her cardiac arrest (and I will look further into this).   - continuous telemetry    #Bradycardia. On tele strips and formal EKG today, appears to be sinus shelton. D/w Cards team who agree with decreasing atenolol dose. Check dig level. They continue to follow.      #Aortic masses. BRE on 7/14 showed 3 small masses on the coronary cusps and LVOT. Do not believe this to be endocarditis; no antibiotics at this time. Unclear what this represents; possibly could be Libmann-Sacks.      #Anasarca, volume overload  - Torsemide 40 mg BID with additional PRN. Per Cards (7/28): changed diuretic to Lasix gtt at 5mg/hr given fluids with CIVI chemo (which is generally around 70cc/hr). This may also help with additional diuresis with increased blood product needs.   - Midodrine to aid in renal perfusion  - 2g Na limit    GI:   #Nausea, mild.   - will now have scheduled antiemetic (Zofran, Emend)) with chemo, PRN Compazine/Ativan    #Constipation. Stools harder on 7/28. Pt now reporting start of \"diarrhea\" with loose stools this AM. Back off on scheduled meds, monitor for frequency and consider C.diff test if frequent.     NEURO:  #Saddle anesthesia, urinary incontinence. Pt reports this is baseline for her. Was seen by Neuro in recent admission, will ask for formal consult on Monday.     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 HOLDING PTA Prednisone with plan for higher dose steroids on treatment plan. Resume 5mg dose as of 8/4.   - Follow-up with [facility] Dr. Conor Cunha in 4-6 weeks after discharge    ENDO:  #Steroid " induced hyperglycemia. Added QID BG check and med SSI.     DERM:   #Extravasation-related LUE wound. Slowly improving.   - WOCN following, appreciate recs    GEN:  #Deconditioning. 2/2 prolonged hospital stays, acute illness/malignancy  - PT/OT. Recent rec for ARU, so PM&R consult placed.     FEN  - 2g Na diet  - Dietician following    #Hypokalemia. 2/2 lasix. Will start PO supplement in addition to sliding scale.     PPx:   - VTE: defer VTE pharmacologic ppx due to thrombocytopenia  - GI: continue protonix    CODE: Full Code    Disposition: Inpatient hospitalization until completion of cycle 1 of chemotherapy and when medically ready. Will need rehab on discharge. PT rec ARU, so place PM&R consult on 7/28.      Kalpana rWen PA-C  Heme/Onc  [phone] (pager)     Interval History   No acute events overnight, afebrile. States she had a better night. One episode of drenching night sweats. Diarrhea has started this AM, too (recent laxatives). Denies feeling any symptoms with low HR overnight as she was sleeping. Denies palpitations. Had a lot of UOP overnight, has Huynh in place. Weight up despite large output. She continues to feel the fluid fullness centrally, less so in her legs. Her sister is visiting today.     Physical Exam   Temp: 97.5  F (36.4  C) Temp src: Axillary BP: 139/59 Pulse: 54 Heart Rate: 56 Resp: 18 SpO2: 98 % O2 Device: Nasal cannula Oxygen Delivery: 2 LPM  Vitals:    07/27/17 1052 07/28/17 0748 07/29/17 0753   Weight: 67 kg (147 lb 12.8 oz) 67.5 kg (148 lb 13 oz) 71.9 kg (158 lb 9.6 oz)     Vital Signs with Ranges  Temp:  [96.3  F (35.7  C)-102.3  F (39.1  C)] 97.5  F (36.4  C)  Pulse:  [54-62] 54  Heart Rate:  [39-71] 56  Resp:  [12-21] 18  BP: ()/(50-67) 139/59  SpO2:  [96 %-99 %] 98 %  I/O last 3 completed shifts:  In: 2665.5 [P.O.:600; I.V.:800; IV Piggyback:974.5]  Out: 4700 [Urine:4700]  Constitutional: Pleasant female seen reclined in bed. She is awake, alert, interactive.  Sister at bedside.   HEENT: NC/AT. OP pink and slightly dry this AM.    Respiratory: Breathing appears less labored with talking; decent air exchange, on NC. Able to sit up with assist, decreased breath sounds over bases but no wheezing or crackles appreciated today.   Cardiovascular: RRR. 3/6 holosystolic murmur appreciated over precordium.   GI: Normal BS. Abd soft, non-distended.  Skin: LUE wound (extravasation at time of her cardiac arrest) covered with dressing that is c/d/i.   Musculoskeletal: Mild-mod LE edema, with compression stockings in place.   Neurologic: Awake, alert and oriented. Cranial nerves II-XII are grossly intact.   Neuropsychiatric: Calm, normal affect     Medications     furosemide (LASIX) infusion ADULT 5 mg/hr (07/28/17 2104)     - MEDICATION INSTRUCTIONS -       NaCl       IV fluid REPLACEMENT ONLY       - MEDICATION INSTRUCTIONS -         potassium chloride  20 mEq Oral BID    Or     potassium chloride  20 mEq Oral BID     insulin aspart  1-7 Units Subcutaneous TID AC     insulin aspart  1-5 Units Subcutaneous At Bedtime     atenolol  25 mg Oral BID     melatonin  1-3 mg Oral At Bedtime     etoposide (TOPOSAR) chemo infusion  25 mg/m2 (Treatment Plan Recorded) Intravenous Q24H     phytonadione  10 mg Oral Daily     sennosides  2-3 tablet Oral BID     allopurinol  300 mg Oral Daily     acyclovir  400 mg Oral BID     fluconazole  200 mg Oral Daily     levofloxacin  250 mg Oral Daily     ondansetron  16 mg Oral Q24H     [START ON 8/1/2017] fosaprepitant (EMEND) 150 mg intermittent infusion  150 mg Intravenous Once     [START ON 8/2/2017] dexamethasone  8 mg Oral Daily     predniSONE  30 mg/m2 (Treatment Plan Recorded) Oral BID     Chemotherapy Infusing-Continuous Infusion   Does not apply Q8H     [START ON 8/2/2017] filgrastim (NEUPOGEN/GRANIX) intravenous  5 mcg/kg (Treatment Plan Recorded) Intravenous Daily at 8 pm     vinCRIStine/DOXOrubicin (ONCOVIN/ADRIAMYCIN) chemo infusion  0.4 mg/m2  (Treatment Plan Recorded) Intravenous Q24H     [START ON 8/1/2017] cyclophosphamide (CYTOXAN) chemo infusion  750 mg/m2 (Treatment Plan Recorded) Intravenous Once     sodium chloride (PF)  10 mL Intracatheter Q7 Days     [START ON 8/4/2017] predniSONE  5 mg Oral Daily     morphine 0.1% in solosite  2 g Transdermal Daily     sodium chloride (PF)  1-74 mL Intracatheter Q8H     vitamin  B-1  50 mg Oral Daily     folic acid  1 mg Oral Daily     midodrine  10 mg Oral TID w/meals     miconazole   Topical BID     pantoprazole  40 mg Oral QAM     digoxin  125 mcg Oral Daily     sodium chloride (PF)  10 mL Intracatheter Q8H     gabapentin  200 mg Oral TID     multivitamin, therapeutic with minerals  1 tablet Oral Daily     calcium-vitamin D  2 tablet Oral Daily       Data   Results for orders placed or performed during the hospital encounter of 06/19/17 (from the past 24 hour(s))   Basic metabolic panel   Result Value Ref Range    Sodium 132 (L) 133 - 144 mmol/L    Potassium 4.3 3.4 - 5.3 mmol/L    Chloride 92 (L) 94 - 109 mmol/L    Carbon Dioxide 24 20 - 32 mmol/L    Anion Gap 17 (H) 3 - 14 mmol/L    Glucose 116 (H) 70 - 99 mg/dL    Urea Nitrogen 40 (H) 7 - 30 mg/dL    Creatinine 0.82 0.52 - 1.04 mg/dL    GFR Estimate 72 >60 mL/min/1.7m2    GFR Estimate If Black 88 >60 mL/min/1.7m2    Calcium 8.3 (L) 8.5 - 10.1 mg/dL   CBC with platelets differential   Result Value Ref Range    WBC 2.8 (L) 4.0 - 11.0 10e9/L    RBC Count 2.40 (L) 3.8 - 5.2 10e12/L    Hemoglobin 7.5 (L) 11.7 - 15.7 g/dL    Hematocrit 23.3 (L) 35.0 - 47.0 %    MCV 97 78 - 100 fl    MCH 31.3 26.5 - 33.0 pg    MCHC 32.2 31.5 - 36.5 g/dL    RDW 27.8 (H) 10.0 - 15.0 %    Platelet Count 22 (LL) 150 - 450 10e9/L    Diff Method Manual Differential     % Neutrophils 92.8 %    % Lymphocytes 2.7 %    % Monocytes 4.5 %    % Eosinophils 0.0 %    % Basophils 0.0 %    Nucleated RBCs 2 (H) 0 /100    Absolute Neutrophil 2.6 1.6 - 8.3 10e9/L    Absolute Lymphocytes 0.1 (L) 0.8  - 5.3 10e9/L    Absolute Monocytes 0.1 0.0 - 1.3 10e9/L    Absolute Eosinophils 0.0 0.0 - 0.7 10e9/L    Absolute Basophils 0.0 0.0 - 0.2 10e9/L    Absolute Nucleated RBC 0.1     Anisocytosis Slight     Poikilocytosis Slight     Ovalocytes Slight     Macrocytes Present     Platelet Estimate Confirming automated cell count    Fibrinogen activity   Result Value Ref Range    Fibrinogen 272 200 - 420 mg/dL   INR   Result Value Ref Range    INR 1.82 (H) 0.86 - 1.14   Lactate Dehydrogenase   Result Value Ref Range    Lactate Dehydrogenase 1446 (H) 81 - 234 U/L   Partial thromboplastin time   Result Value Ref Range    PTT 38 (H) 22 - 37 sec   Phosphorus   Result Value Ref Range    Phosphorus 4.0 2.5 - 4.5 mg/dL   Uric acid   Result Value Ref Range    Uric Acid 2.2 (L) 2.6 - 6.0 mg/dL   Plasma prepare order unit conditional   Result Value Ref Range    Ordered Component Type Plasma     Units Ordered 1    Blood component   Result Value Ref Range    Unit Number [id number]     Blood Component Type Plasma, Thawed     Division Number 00     Status of Unit Released to care unit     Blood Product Code [id number]     Unit Status ISS    Basic metabolic panel   Result Value Ref Range    Sodium 137 133 - 144 mmol/L    Potassium 3.0 (L) 3.4 - 5.3 mmol/L    Chloride 93 (L) 94 - 109 mmol/L    Carbon Dioxide 35 (H) 20 - 32 mmol/L    Anion Gap 9 3 - 14 mmol/L    Glucose 225 (H) 70 - 99 mg/dL    Urea Nitrogen 35 (H) 7 - 30 mg/dL    Creatinine 0.65 0.52 - 1.04 mg/dL    GFR Estimate >90  Non  GFR Calc   >60 mL/min/1.7m2    GFR Estimate If Black >90   GFR Calc   >60 mL/min/1.7m2    Calcium 8.4 (L) 8.5 - 10.1 mg/dL   CBC with platelets differential   Result Value Ref Range    WBC 2.8 (L) 4.0 - 11.0 10e9/L    RBC Count 2.98 (L) 3.8 - 5.2 10e12/L    Hemoglobin 9.1 (L) 11.7 - 15.7 g/dL    Hematocrit 27.9 (L) 35.0 - 47.0 %    MCV 94 78 - 100 fl    MCH 30.5 26.5 - 33.0 pg    MCHC 32.6 31.5 - 36.5 g/dL    RDW 27.8 (H)  10.0 - 15.0 %    Platelet Count 15 (LL) 150 - 450 10e9/L    Diff Method Automated Method     % Neutrophils 69.5 %    % Lymphocytes 15.4 %    % Monocytes 14.3 %    % Eosinophils 0.0 %    % Basophils 0.4 %    % Immature Granulocytes 0.4 %    Nucleated RBCs 0 0 /100    Absolute Neutrophil 2.0 1.6 - 8.3 10e9/L    Absolute Lymphocytes 0.4 (L) 0.8 - 5.3 10e9/L    Absolute Monocytes 0.4 0.0 - 1.3 10e9/L    Absolute Eosinophils 0.0 0.0 - 0.7 10e9/L    Absolute Basophils 0.0 0.0 - 0.2 10e9/L    Abs Immature Granulocytes 0.0 0 - 0.4 10e9/L    Absolute Nucleated RBC 0.0     Platelet Estimate Confirming automated cell count    Fibrinogen activity   Result Value Ref Range    Fibrinogen 357 200 - 420 mg/dL   INR   Result Value Ref Range    INR 1.58 (H) 0.86 - 1.14   Lactate Dehydrogenase   Result Value Ref Range    Lactate Dehydrogenase 964 (H) 81 - 234 U/L   Partial thromboplastin time   Result Value Ref Range    PTT 33 22 - 37 sec   Phosphorus   Result Value Ref Range    Phosphorus 3.9 2.5 - 4.5 mg/dL   Uric acid   Result Value Ref Range    Uric Acid 2.4 (L) 2.6 - 6.0 mg/dL   Magnesium   Result Value Ref Range    Magnesium 1.9 1.6 - 2.3 mg/dL   Hepatic panel   Result Value Ref Range    Bilirubin Direct 1.6 (H) 0.0 - 0.2 mg/dL    Bilirubin Total 2.7 (H) 0.2 - 1.3 mg/dL    Albumin 3.0 (L) 3.4 - 5.0 g/dL    Protein Total 6.3 (L) 6.8 - 8.8 g/dL    Alkaline Phosphatase 318 (H) 40 - 150 U/L    ALT 40 0 - 50 U/L     (H) 0 - 45 U/L   EKG 12-lead, complete   Result Value Ref Range    Interpretation ECG Click View Image link to view waveform and result    Potassium   Result Value Ref Range    Potassium 3.1 (L) 3.4 - 5.3 mmol/L   Glucose by meter   Result Value Ref Range    Glucose 196 (H) 70 - 99 mg/dL   Glucose by meter   Result Value Ref Range    Glucose 311 (H) 70 - 99 mg/dL   Glucose by meter   Result Value Ref Range    Glucose 263 (H) 70 - 99 mg/dL

## 2017-07-29 NOTE — PLAN OF CARE
Problem: Goal Outcome Summary  Goal: Goal Outcome Summary  1. Pt will have fevers well controlled  2. Pt will be hemodynamically stable   5C-PT: Despite increased fatigue today, pt agreeable to PT session this p.m. Performed bed mobility and STS transfer with supervision. To promote Le strengthening and ROM, pt performed standing exercises, 1 x 10 noam LE. Tolerated well. To promote improvement in gait mechanics and tolerance, pt ambualted ~30' in room with FWW and stand-by assist - supervision. Tolerated fairly today.      Pending pt's medical course, cont to rec dc to ARU when medically stable for continued progression of overall indep with functional mobility - pt is well below baseline, has interdisciplinary needs, and is motivated to work with therapies.

## 2017-07-29 NOTE — PLAN OF CARE
Problem: Individualization  Goal: Patient Preferences  Outcome: No Change  Patient slept well through the night, no complaint of nausea or pain  Huynh was placed last evening, patient has had 4,700 cc of urine out, Lasix gtt at 5mg/hour  RBCs replaced last evening for a count of 7.5  Potassium replaced this morning for a level of 3.0, will need a re-draw at 0830 this morning.   Continuous chemo infusing, positive blood return noted every 4 hours through the night.   Diaphoretic over night, complete bed change X1.   Patient HR through the night 38 - 62, MDs were notified. No orders obtained  Continue with current plan of care.     Problem: Chemotherapy Effects (Adult)  Goal: Signs and Symptoms of Listed Potential Problems Will be Absent or Manageable (Chemotherapy Effects)  Signs and symptoms of listed potential problems will be absent or manageable by discharge/transition of care (reference Chemotherapy Effects (Adult) CPG).   Outcome: No Change    07/29/17 0601   Chemotherapy Effects   Problems Assessed (Chemotherapy Effects) all   Problems Present (Chemotherapy Effects) fatigue

## 2017-07-30 ENCOUNTER — APPOINTMENT (OUTPATIENT)
Dept: PHYSICAL THERAPY | Facility: CLINIC | Age: 57
DRG: 871 | End: 2017-07-30
Attending: INTERNAL MEDICINE
Payer: COMMERCIAL

## 2017-07-30 LAB
ALBUMIN SERPL-MCNC: 2.6 G/DL (ref 3.4–5)
ALP SERPL-CCNC: 300 U/L (ref 40–150)
ALT SERPL W P-5'-P-CCNC: 43 U/L (ref 0–50)
ANION GAP SERPL CALCULATED.3IONS-SCNC: 5 MMOL/L (ref 3–14)
APTT PPP: 26 SEC (ref 22–37)
AST SERPL W P-5'-P-CCNC: 81 U/L (ref 0–45)
BASOPHILS # BLD AUTO: 0 10E9/L (ref 0–0.2)
BASOPHILS NFR BLD AUTO: 0 %
BILIRUB DIRECT SERPL-MCNC: 1.1 MG/DL (ref 0–0.2)
BILIRUB SERPL-MCNC: 1.9 MG/DL (ref 0.2–1.3)
BUN SERPL-MCNC: 35 MG/DL (ref 7–30)
CALCIUM SERPL-MCNC: 7.7 MG/DL (ref 8.5–10.1)
CHLORIDE SERPL-SCNC: 97 MMOL/L (ref 94–109)
CO2 SERPL-SCNC: 36 MMOL/L (ref 20–32)
CREAT SERPL-MCNC: 0.57 MG/DL (ref 0.52–1.04)
DIFFERENTIAL METHOD BLD: ABNORMAL
DIGOXIN SERPL-MCNC: 1.1 UG/L (ref 0.5–2)
EOSINOPHIL # BLD AUTO: 0 10E9/L (ref 0–0.7)
EOSINOPHIL NFR BLD AUTO: 0 %
ERYTHROCYTE [DISTWIDTH] IN BLOOD BY AUTOMATED COUNT: 28.4 % (ref 10–15)
FIBRINOGEN PPP-MCNC: 282 MG/DL (ref 200–420)
GFR SERPL CREATININE-BSD FRML MDRD: ABNORMAL ML/MIN/1.7M2
GLUCOSE BLDC GLUCOMTR-MCNC: 228 MG/DL (ref 70–99)
GLUCOSE BLDC GLUCOMTR-MCNC: 230 MG/DL (ref 70–99)
GLUCOSE BLDC GLUCOMTR-MCNC: 233 MG/DL (ref 70–99)
GLUCOSE SERPL-MCNC: 211 MG/DL (ref 70–99)
HCT VFR BLD AUTO: 27 % (ref 35–47)
HGB BLD-MCNC: 8.8 G/DL (ref 11.7–15.7)
IMM GRANULOCYTES # BLD: 0 10E9/L (ref 0–0.4)
IMM GRANULOCYTES NFR BLD: 1.4 %
INR PPP: 1.33 (ref 0.86–1.14)
LDH SERPL L TO P-CCNC: 488 U/L (ref 81–234)
LYMPHOCYTES # BLD AUTO: 0.4 10E9/L (ref 0.8–5.3)
LYMPHOCYTES NFR BLD AUTO: 14.8 %
MAGNESIUM SERPL-MCNC: 2.1 MG/DL (ref 1.6–2.3)
MCH RBC QN AUTO: 30.7 PG (ref 26.5–33)
MCHC RBC AUTO-ENTMCNC: 32.6 G/DL (ref 31.5–36.5)
MCV RBC AUTO: 94 FL (ref 78–100)
MONOCYTES # BLD AUTO: 0.3 10E9/L (ref 0–1.3)
MONOCYTES NFR BLD AUTO: 10.9 %
NEUTROPHILS # BLD AUTO: 2.1 10E9/L (ref 1.6–8.3)
NEUTROPHILS NFR BLD AUTO: 72.9 %
NRBC # BLD AUTO: 0 10*3/UL
NRBC BLD AUTO-RTO: 0 /100
PHOSPHATE SERPL-MCNC: 3 MG/DL (ref 2.5–4.5)
PLATELET # BLD AUTO: 17 10E9/L (ref 150–450)
PLATELET # BLD EST: ABNORMAL 10*3/UL
POTASSIUM SERPL-SCNC: 3.4 MMOL/L (ref 3.4–5.3)
PROT SERPL-MCNC: 5.9 G/DL (ref 6.8–8.8)
RBC # BLD AUTO: 2.87 10E12/L (ref 3.8–5.2)
SODIUM SERPL-SCNC: 138 MMOL/L (ref 133–144)
URATE SERPL-MCNC: 2.8 MG/DL (ref 2.6–6)
WBC # BLD AUTO: 2.8 10E9/L (ref 4–11)

## 2017-07-30 PROCEDURE — 25000132 ZZH RX MED GY IP 250 OP 250 PS 637: Performed by: INTERNAL MEDICINE

## 2017-07-30 PROCEDURE — 85730 THROMBOPLASTIN TIME PARTIAL: CPT | Performed by: INTERNAL MEDICINE

## 2017-07-30 PROCEDURE — 40000193 ZZH STATISTIC PT WARD VISIT

## 2017-07-30 PROCEDURE — 25000125 ZZHC RX 250: Performed by: INTERNAL MEDICINE

## 2017-07-30 PROCEDURE — 85610 PROTHROMBIN TIME: CPT | Performed by: INTERNAL MEDICINE

## 2017-07-30 PROCEDURE — 83615 LACTATE (LD) (LDH) ENZYME: CPT | Performed by: INTERNAL MEDICINE

## 2017-07-30 PROCEDURE — 20000002 ZZH R&B BMT INTERMEDIATE

## 2017-07-30 PROCEDURE — 80048 BASIC METABOLIC PNL TOTAL CA: CPT | Performed by: INTERNAL MEDICINE

## 2017-07-30 PROCEDURE — 86901 BLOOD TYPING SEROLOGIC RH(D): CPT | Performed by: PHYSICAL MEDICINE & REHABILITATION

## 2017-07-30 PROCEDURE — 99231 SBSQ HOSP IP/OBS SF/LOW 25: CPT | Mod: GC | Performed by: INTERNAL MEDICINE

## 2017-07-30 PROCEDURE — 25000132 ZZH RX MED GY IP 250 OP 250 PS 637

## 2017-07-30 PROCEDURE — 80076 HEPATIC FUNCTION PANEL: CPT | Performed by: PHYSICIAN ASSISTANT

## 2017-07-30 PROCEDURE — 25000128 H RX IP 250 OP 636: Performed by: PHYSICIAN ASSISTANT

## 2017-07-30 PROCEDURE — 25000128 H RX IP 250 OP 636: Performed by: INTERNAL MEDICINE

## 2017-07-30 PROCEDURE — 85025 COMPLETE CBC W/AUTO DIFF WBC: CPT | Performed by: INTERNAL MEDICINE

## 2017-07-30 PROCEDURE — 25000132 ZZH RX MED GY IP 250 OP 250 PS 637: Performed by: STUDENT IN AN ORGANIZED HEALTH CARE EDUCATION/TRAINING PROGRAM

## 2017-07-30 PROCEDURE — 97110 THERAPEUTIC EXERCISES: CPT | Mod: GP

## 2017-07-30 PROCEDURE — 86900 BLOOD TYPING SEROLOGIC ABO: CPT | Performed by: PHYSICAL MEDICINE & REHABILITATION

## 2017-07-30 PROCEDURE — 84100 ASSAY OF PHOSPHORUS: CPT | Performed by: INTERNAL MEDICINE

## 2017-07-30 PROCEDURE — 80162 ASSAY OF DIGOXIN TOTAL: CPT | Performed by: INTERNAL MEDICINE

## 2017-07-30 PROCEDURE — 84550 ASSAY OF BLOOD/URIC ACID: CPT | Performed by: INTERNAL MEDICINE

## 2017-07-30 PROCEDURE — 86850 RBC ANTIBODY SCREEN: CPT | Performed by: PHYSICAL MEDICINE & REHABILITATION

## 2017-07-30 PROCEDURE — 85384 FIBRINOGEN ACTIVITY: CPT | Performed by: INTERNAL MEDICINE

## 2017-07-30 PROCEDURE — 25000132 ZZH RX MED GY IP 250 OP 250 PS 637: Performed by: PHYSICIAN ASSISTANT

## 2017-07-30 PROCEDURE — 80076 HEPATIC FUNCTION PANEL: CPT | Performed by: INTERNAL MEDICINE

## 2017-07-30 PROCEDURE — 25000132 ZZH RX MED GY IP 250 OP 250 PS 637: Performed by: NURSE PRACTITIONER

## 2017-07-30 PROCEDURE — 86923 COMPATIBILITY TEST ELECTRIC: CPT | Performed by: PHYSICAL MEDICINE & REHABILITATION

## 2017-07-30 PROCEDURE — 83735 ASSAY OF MAGNESIUM: CPT | Performed by: INTERNAL MEDICINE

## 2017-07-30 PROCEDURE — 97116 GAIT TRAINING THERAPY: CPT | Mod: GP

## 2017-07-30 PROCEDURE — 00000146 ZZHCL STATISTIC GLUCOSE BY METER IP

## 2017-07-30 PROCEDURE — 97530 THERAPEUTIC ACTIVITIES: CPT | Mod: GP

## 2017-07-30 PROCEDURE — 25000131 ZZH RX MED GY IP 250 OP 636 PS 637: Performed by: INTERNAL MEDICINE

## 2017-07-30 RX ADMIN — Medication 12.5 MG: at 22:52

## 2017-07-30 RX ADMIN — POTASSIUM CHLORIDE 40 MEQ: 750 TABLET, EXTENDED RELEASE ORAL at 02:09

## 2017-07-30 RX ADMIN — MIDODRINE HYDROCHLORIDE 10 MG: 5 TABLET ORAL at 09:03

## 2017-07-30 RX ADMIN — Medication 2 G: at 17:12

## 2017-07-30 RX ADMIN — MIDODRINE HYDROCHLORIDE 10 MG: 5 TABLET ORAL at 13:18

## 2017-07-30 RX ADMIN — POTASSIUM CHLORIDE 20 MEQ: 750 TABLET, EXTENDED RELEASE ORAL at 09:03

## 2017-07-30 RX ADMIN — ATENOLOL 25 MG: 25 TABLET ORAL at 09:55

## 2017-07-30 RX ADMIN — MIDODRINE HYDROCHLORIDE 10 MG: 5 TABLET ORAL at 17:30

## 2017-07-30 RX ADMIN — POTASSIUM CHLORIDE 20 MEQ: 750 TABLET, EXTENDED RELEASE ORAL at 09:05

## 2017-07-30 RX ADMIN — FLUCONAZOLE 200 MG: 200 TABLET ORAL at 09:03

## 2017-07-30 RX ADMIN — FOLIC ACID 1 MG: 1 TABLET ORAL at 09:04

## 2017-07-30 RX ADMIN — GABAPENTIN 200 MG: 100 CAPSULE ORAL at 20:18

## 2017-07-30 RX ADMIN — DOXORUBICIN HYDROCHLORIDE 0.66 MG: 2 INJECTION, SOLUTION INTRAVENOUS at 17:31

## 2017-07-30 RX ADMIN — ACYCLOVIR 400 MG: 400 TABLET ORAL at 09:03

## 2017-07-30 RX ADMIN — PANTOPRAZOLE SODIUM 40 MG: 40 TABLET, DELAYED RELEASE ORAL at 09:04

## 2017-07-30 RX ADMIN — PREDNISONE 50 MG: 50 TABLET ORAL at 09:04

## 2017-07-30 RX ADMIN — CALCIUM CARBONATE 600 MG (1,500 MG)-VITAMIN D3 400 UNIT TABLET 2 TABLET: at 09:04

## 2017-07-30 RX ADMIN — MICONAZOLE NITRATE: 2 POWDER TOPICAL at 10:04

## 2017-07-30 RX ADMIN — MULTIPLE VITAMINS W/ MINERALS TAB 1 TABLET: TAB at 09:04

## 2017-07-30 RX ADMIN — ALLOPURINOL 300 MG: 300 TABLET ORAL at 09:03

## 2017-07-30 RX ADMIN — ACYCLOVIR 400 MG: 400 TABLET ORAL at 20:18

## 2017-07-30 RX ADMIN — GABAPENTIN 200 MG: 100 CAPSULE ORAL at 14:38

## 2017-07-30 RX ADMIN — POTASSIUM CHLORIDE 20 MEQ: 750 TABLET, EXTENDED RELEASE ORAL at 17:30

## 2017-07-30 RX ADMIN — GABAPENTIN 200 MG: 100 CAPSULE ORAL at 09:04

## 2017-07-30 RX ADMIN — ETOPOSIDE 40 MG: 20 INJECTION, SOLUTION, CONCENTRATE INTRAVENOUS at 15:48

## 2017-07-30 RX ADMIN — ONDANSETRON HYDROCHLORIDE 16 MG: 8 TABLET, FILM COATED ORAL at 15:51

## 2017-07-30 RX ADMIN — PHYTONADIONE 10 MG: 5 TABLET ORAL at 09:03

## 2017-07-30 RX ADMIN — DIGOXIN 125 MCG: 125 TABLET ORAL at 09:03

## 2017-07-30 RX ADMIN — PREDNISONE 50 MG: 50 TABLET ORAL at 15:51

## 2017-07-30 RX ADMIN — MICONAZOLE NITRATE: 2 POWDER TOPICAL at 22:28

## 2017-07-30 RX ADMIN — THIAMINE HCL (VITAMIN B1) 50 MG TABLET 50 MG: at 09:03

## 2017-07-30 RX ADMIN — FUROSEMIDE 5 MG/HR: 10 INJECTION, SOLUTION INTRAVENOUS at 09:55

## 2017-07-30 NOTE — PROGRESS NOTES
Cardiology Follow Up Note    Brief clinic Hx: 56F with HFpEF, PAF, and new dx DLBCL recently transferred to oncology and initiated on chemotherapy.    Interval: Monitor showed sinus bradycardia with HR 30-40 overngiht while sleeping, longest pause 1.8 sec, and HR 50s this morning while awake. Pt is asymptomatic. BP stable. I discussed this with primary team and patient and her family. They highlight the fact that pt previously suffered a post DCCV cardiac arrest but also developed symptomatic RVR last time BB was discontinued. Due to this they request fine adjustment of BB medication.    Recommend:  - decrease atenolol from 50 QAM/25 QPM to 25 QAM only  - cont dig, check level  - discussed in detail with patient and her family who are agreeable    Gaston Fox MD  Cardiology Fellow  [phone]    I discussed the patient with the house staff.  I agree with the assessment and plan as documented.    Selvin Gandara MD, PhD  Professor of Medicine  Division of Cardiology

## 2017-07-30 NOTE — PLAN OF CARE
Problem: Goal Outcome Summary  Goal: Goal Outcome Summary  1. Pt will have fevers well controlled  2. Pt will be hemodynamically stable   5C-PT: Pt performed bed mobility today with supervision for personal hygiene cares. Pt with improvement in functional mobility, ambulated 2 x ~250' with FWW and supervision. Taking brief sitting rest break during each bout. She performed seated TE to promote LE strengthening and ROM. Educated pt and SO on lab values and implications for exercises - will likely need reinforcement. Tolerated session well today, but remains limited by dec activity tolerance.      Pending pt's medical course, cont to rec dc to ARU when medically stable for continued progression of overall indep with functional mobility - pt is well below baseline, has interdisciplinary needs, supportive family, and is motivated to work with therapies.

## 2017-07-30 NOTE — PROGRESS NOTES
[facility], [place]    Hematology / Oncology Progress Note    Date of Service (when I saw the patient): 07/30/2017     Assessment & Plan   Amelia Michel is a 56 year old female who was admitted on 6/19/2017. She has complex past medical history of prior VSD repair, rheumatoid arthritis on long-term methotrexate, recent dx atrial fibrillation/flutter/SVT who was recently admitted to [facility] on 5/29 after an out of hospital cardiac arrest, admission complicated by intubation with discharge to TCU. Was discharged to TCU and subsequently readmitted to Cardiology service for FUO. With workup of fever and progressive cytopenias, diagnosis of DLBCL was made. She was transferred to the Hematology service on 7/26. See discussions of diagnosis and reasoning for treatment plan in 7/27 Heme/Onc note. Started Cycle 1 R-EPOCH on 7/28.     HEME:    #DLBCL. Stage 4 with bone marrow and liver involvement. IPI 4. Possibly related to prior methotrexate.   - PET/CT 7/18 revealing for mediastinal and neck level 2A lymphadenopathy, extensive FDG-avid bony lesions, hepatosplenic FDG-avid lesions, pelvic lesions contingous with the uterus, and bilateral pleural effusions.    - Bone marrow biopsy 7/12: scattered plasma cells in perivascular clusters, with no definitive staining of B-cell population by CD5; on re-review, possibly consistent with intravascularge large B-cell lymphoma. However, liver biopsy (7/21) consistent with DLBCL, negative for BCL2/BCL6/MYC rearrangements, CD20 positive.   - PICC line placed    Treatment Plan: DA-EPOCH-R Cycle 1 (Day 1=7/28/17). Today is Day 3.   - reaction to rituxan (hypotension with BP 87/45, SOB/feeling of ?throat swelling/difficulty getting breath in; then fever/rigors). No stridor. No rashes or hives. Received rescue methylpred and demerol).   - Etoposide CIVI on Days 1-4  - Vincristine/Doxorubicin CIVI on Days 1-4  - Prednisone 50mg BID x 5 days  - Cytoxan on Day 5  -  will need Neupogen support starting D6    #Hyperuricemia  #Monitor for TLS.  Uric acid up to 8.2 (7/27 PM), s/p Rasburicase. Uric acid improved to 4.2. Continue Allopurinol. Adjust labs back to daily given stability. Back off on frequency of Mg/Phos/uric acid when appropriate after chemotherapy.     #DIC. INR prolonged, now improving. Fibrinogen currently ok.   - conditional transfusion parameters in place: 2U plasma for INR >1.8, 5U cryo for fibrinogen <150.   - started Vitamin K 10mg PO x 3 days (7/28-7/30)  - adjust coag monitoring to qMWF x 4 occurrences; adjust if needed    #Pancytopenia, secondary to DLBCL  - transfuse to maintain Hgb >8.0 (keep given recent cardiac history), platelets >10,000    ID:   #PPX  - continue ppx ACV, Levaquin, Fluconazole     #Persistent Fevers.  #Lactic acidosis.   Previously followed by ID, who signed off 7/21.  Extensive infectious evaluation including blood cultures, which remain NGTD. Additionally, stool virus panel, M. Tuberculsosis, tick-borne illnesses, and bartonella/Q-fever have all been negative. Fevers/lactate secondary to malignancy.   - possible pneumonia while recently on meropenem (7/1-7/5);  CT chest (7/13) did reveal a right lung tree-in-bud opacities with more consolidative opacities in RLL; but no accompanying focal symptoms. Appears to have received dose of Meropenem on 7/14, now off.   - lactic acid increased to 10.4 (7/28) during Rituxan infusion reaction.   - cultures have been NGTD, avoid additional fluids given hypervolemia, monitor closely    CV/PV:    #Atrial fibrillation/flutter   #Recent out of hospital cardiac arrest (5/29/17)  #Prior VSD repair (1969)  - PET scan suggested FDG-avid lesions leading to thickening of the interatrial septum, extending superiorly along the main pulmonary artery; although cardiac MRI 7/24 shows no evidence of infiltrative disease    - Appreciate Cardiology team recs  - continue Digoxin 125mcg daily. Digoxin level within  range on 7/30 AM.   - continue Atenolol, now adjusted to 25mg daily given bradycardia overnight. Note, per pt/, metoprolol has not worked for her and makes her feel poorly.  PT also notes that she does NOT want amiodarone, as there is concern that this led to her cardiac arrest (and I will look further into this).   - continuous telemetry    #Bradycardia. HRs 39-50s (7/28-7/29). On tele strips and formal EKG 7/29 this appeared to be sinus shelton. D/w Cards team who agree with decreasing atenolol dose. Checked dig level as above. Cardiology team continues to follow.      #Aortic masses. BRE on 7/14 showed 3 small masses on the coronary cusps and LVOT. Do not believe this to be endocarditis; no antibiotics at this time. Unclear what this represents; possibly could be Libmann-Sacks.      #Anasarca, volume overload  - Torsemide 40 mg BID with additional PRN. Per Cards (7/28): changed diuretic to Lasix gtt at 5mg/hr given fluids with CIVI chemo (which is generally around 70cc/hr). This may also help with additional diuresis with increased blood product needs.   - Midodrine to aid in renal perfusion  - 2g Na limit    GI:   #Nausea, mild.   - will now have scheduled antiemetic (Zofran, Emend)) with chemo, PRN Compazine/Ativan    #LFT derangement. 2/2 lymphoma involvement. ALT/AST significantly improved from prior. Alk phos more elevated today, but will monitor for fluctuations. Tbili has been elevated (chemo adjusted), now downtrending.   - monitor daily through chemo, then adjust frequency as indicated    #Constipation vs. diarrhea. Stools harder on 7/28. With scheduled laxatives, pt developed looser stools. Backed off on scheduled bowel regimen. Stools now soft.     NEURO:  #Saddle anesthesia, urinary incontinence. Pt reports this is baseline for her. Unclear etiology. Was initially seen in ED and evaluated by NSG in 03/2016 for this without clear explanation for symptoms. She has been noted to have an S1  radiculopathy. Recently seen by Neuro in 06/2017, will ask for formal consult on Monday (please enter on 7/31).      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 HOLDING PTA Prednisone with plan for higher dose steroids on treatment plan. Resume 5mg dose as of 8/4.   - Follow-up with [facility] Dr. Conor Cunha in 4-6 weeks after discharge    ENDO:  #Steroid induced hyperglycemia. Added QID BG check and med SSI.     DERM:   #Extravasation-related LUE wound. Slowly improving.   - WOCN following, appreciate recs    GEN:  #Deconditioning. 2/2 prolonged hospital stays, acute illness/malignancy  - PT/OT. Recent rec for ARU, so PM&R consult placed.     FEN  - 2g Na diet  - Dietician following    #Hypokalemia. 2/2 lasix. Started PO supplement in addition to sliding scale (currently high given cardiac issues/acute illness).     PPx:   - VTE: defer VTE pharmacologic ppx due to thrombocytopenia  - GI: continue protonix    CODE: Full Code    Disposition: Inpatient hospitalization until completion of cycle 1 of chemotherapy and when medically ready. Will need rehab on discharge. PT rec ARU, so place PM&R consult on 7/28.      Kalpana Wren PA-C  Heme/Onc  [phone] (pager)     Interval History   No acute events overnight, afebrile. Tolerating chemo well thus far. An ok night, though main issue was having to take potassium on several occasions. Does not like the potassium packets (too salty) and doesn't like the idea of dissolving the pills to place them in applesauce. States she'll stick with the pills for now. No fevers. Breathing is comfortable to her and she is talking more easily. Not wearing O2 this AM. Denies feeling of palpitations.  Denies nausea. Diarrhea better - stool was soft this AM. Will have some visitors here today.     Physical Exam   Temp: 97.6  F (36.4  C) Temp src: Axillary BP: 128/58 Pulse: 55 Heart Rate: 58 Resp: 18 SpO2: 95 % O2 Device: None (Room air) Oxygen Delivery: 2 LPM  Vitals:    07/29/17 0753 07/30/17 0535 07/30/17 0751   Weight: 71.9 kg (158 lb 9.6 oz) 67 kg (147 lb 11.3 oz) 67 kg (147 lb 11.3 oz)     Vital Signs with Ranges  Temp:  [96.7  F (35.9  C)-97.9  F (36.6  C)] 97.6  F (36.4  C)  Pulse:  [49-58] 55  Heart Rate:  [51-58] 58  Resp:  [18] 18  BP: (122-140)/(58-68) 128/58  SpO2:  [95 %-99 %] 95 %  I/O last 3 completed shifts:  In: 4252.6 [P.O.:2100; I.V.:542; IV Piggyback:1610.6]  Out: 5350 [Urine:5350]  Constitutional: Pleasant female seen reclined in bed. She is awake, alert, interactive.    HEENT: NC/AT. OP pink and slightly dry this AM.    Respiratory: Breathing appears even less labored at rest and with talking, now on RA this AM. Decent air exchange. Able to sit up with assist, decreased breath sounds over bases (R>L) but no wheezing or crackles appreciated today.   Cardiovascular: RRR. 3/6 holosystolic murmur appreciated over precordium.   GI: Normal BS. Abd soft, non-distended, less tender diffusely to palpation today.   Skin: LUE wound (extravasation at time of her cardiac arrest) covered with dressing that is c/d/i. Some dried drainage under dressing.   Musculoskeletal: Mild-mod LE edema, with compression stockings in place.   Neurologic: Awake, alert and oriented. Cranial nerves II-XII are grossly intact.   Neuropsychiatric: Calm, mentation appears normal, affect congruent.     Medications     furosemide (LASIX) infusion ADULT 5 mg/hr (07/30/17 0955)     - MEDICATION INSTRUCTIONS -       NaCl       IV fluid REPLACEMENT ONLY       - MEDICATION INSTRUCTIONS -         insulin aspart  1-10 Units Subcutaneous TID AC     insulin aspart  1-7 Units Subcutaneous At Bedtime     potassium chloride  20 mEq Oral BID     Or     potassium chloride  20 mEq Oral BID     atenolol  25 mg Oral Daily     melatonin  1-3 mg Oral At Bedtime     etoposide (TOPOSAR) chemo infusion  25 mg/m2 (Treatment Plan Recorded) Intravenous Q24H     allopurinol  300 mg Oral Daily     acyclovir  400 mg Oral BID     fluconazole  200 mg Oral Daily     ondansetron  16 mg Oral Q24H     [START ON 8/1/2017] fosaprepitant (EMEND) 150 mg intermittent infusion  150 mg Intravenous Once     [START ON 8/2/2017] dexamethasone  8 mg Oral Daily     predniSONE  30 mg/m2 (Treatment Plan Recorded) Oral BID     Chemotherapy Infusing-Continuous Infusion   Does not apply Q8H     [START ON 8/2/2017] filgrastim (NEUPOGEN/GRANIX) intravenous  5 mcg/kg (Treatment Plan Recorded) Intravenous Daily at 8 pm     vinCRIStine/DOXOrubicin (ONCOVIN/ADRIAMYCIN) chemo infusion  0.4 mg/m2 (Treatment Plan Recorded) Intravenous Q24H     [START ON 8/1/2017] cyclophosphamide (CYTOXAN) chemo infusion  750 mg/m2 (Treatment Plan Recorded) Intravenous Once     sodium chloride (PF)  10 mL Intracatheter Q7 Days     [START ON 8/4/2017] predniSONE  5 mg Oral Daily     morphine 0.1% in solosite  2 g Transdermal Daily     sodium chloride (PF)  1-74 mL Intracatheter Q8H     vitamin  B-1  50 mg Oral Daily     folic acid  1 mg Oral Daily     midodrine  10 mg Oral TID w/meals     miconazole   Topical BID     pantoprazole  40 mg Oral QAM     digoxin  125 mcg Oral Daily     sodium chloride (PF)  10 mL Intracatheter Q8H     gabapentin  200 mg Oral TID     multivitamin, therapeutic with minerals  1 tablet Oral Daily     calcium-vitamin D  2 tablet Oral Daily       Data   Results for orders placed or performed during the hospital encounter of 06/19/17 (from the past 24 hour(s))   Glucose by meter   Result Value Ref Range    Glucose 263 (H) 70 - 99 mg/dL   Basic metabolic panel   Result Value Ref Range    Sodium 137 133 - 144 mmol/L    Potassium 3.2 (L) 3.4 - 5.3 mmol/L    Chloride 94 94 - 109 mmol/L    Carbon  Dioxide 34 (H) 20 - 32 mmol/L    Anion Gap 8 3 - 14 mmol/L    Glucose 239 (H) 70 - 99 mg/dL    Urea Nitrogen 34 (H) 7 - 30 mg/dL    Creatinine 0.62 0.52 - 1.04 mg/dL    GFR Estimate >90  Non  GFR Calc   >60 mL/min/1.7m2    GFR Estimate If Black >90   GFR Calc   >60 mL/min/1.7m2    Calcium 8.0 (L) 8.5 - 10.1 mg/dL   CBC with platelets differential   Result Value Ref Range    WBC 3.4 (L) 4.0 - 11.0 10e9/L    RBC Count 2.95 (L) 3.8 - 5.2 10e12/L    Hemoglobin 9.0 (L) 11.7 - 15.7 g/dL    Hematocrit 27.3 (L) 35.0 - 47.0 %    MCV 93 78 - 100 fl    MCH 30.5 26.5 - 33.0 pg    MCHC 33.0 31.5 - 36.5 g/dL    RDW 28.4 (H) 10.0 - 15.0 %    Platelet Count 22 (LL) 150 - 450 10e9/L    Diff Method Automated Method     % Neutrophils 72.3 %    % Lymphocytes 16.2 %    % Monocytes 10.9 %    % Eosinophils 0.0 %    % Basophils 0.0 %    % Immature Granulocytes 0.6 %    Nucleated RBCs 0 0 /100    Absolute Neutrophil 2.5 1.6 - 8.3 10e9/L    Absolute Lymphocytes 0.6 (L) 0.8 - 5.3 10e9/L    Absolute Monocytes 0.4 0.0 - 1.3 10e9/L    Absolute Eosinophils 0.0 0.0 - 0.7 10e9/L    Absolute Basophils 0.0 0.0 - 0.2 10e9/L    Abs Immature Granulocytes 0.0 0 - 0.4 10e9/L    Absolute Nucleated RBC 0.0    Fibrinogen activity   Result Value Ref Range    Fibrinogen 314 200 - 420 mg/dL   INR   Result Value Ref Range    INR 1.39 (H) 0.86 - 1.14   Lactate Dehydrogenase   Result Value Ref Range    Lactate Dehydrogenase 608 (H) 81 - 234 U/L   Partial thromboplastin time   Result Value Ref Range    PTT 31 22 - 37 sec   Phosphorus   Result Value Ref Range    Phosphorus 2.3 (L) 2.5 - 4.5 mg/dL   Uric acid   Result Value Ref Range    Uric Acid 2.4 (L) 2.6 - 6.0 mg/dL   Magnesium   Result Value Ref Range    Magnesium 1.7 1.6 - 2.3 mg/dL   Glucose by meter   Result Value Ref Range    Glucose 279 (H) 70 - 99 mg/dL   Potassium   Result Value Ref Range    Potassium 3.3 (L) 3.4 - 5.3 mmol/L   Glucose by meter   Result Value Ref Range     Glucose 227 (H) 70 - 99 mg/dL   Basic metabolic panel   Result Value Ref Range    Sodium 138 133 - 144 mmol/L    Potassium 3.4 3.4 - 5.3 mmol/L    Chloride 97 94 - 109 mmol/L    Carbon Dioxide 36 (H) 20 - 32 mmol/L    Anion Gap 5 3 - 14 mmol/L    Glucose 211 (H) 70 - 99 mg/dL    Urea Nitrogen 35 (H) 7 - 30 mg/dL    Creatinine 0.57 0.52 - 1.04 mg/dL    GFR Estimate >90  Non  GFR Calc   >60 mL/min/1.7m2    GFR Estimate If Black >90   GFR Calc   >60 mL/min/1.7m2    Calcium 7.7 (L) 8.5 - 10.1 mg/dL   CBC with platelets differential   Result Value Ref Range    WBC 2.8 (L) 4.0 - 11.0 10e9/L    RBC Count 2.87 (L) 3.8 - 5.2 10e12/L    Hemoglobin 8.8 (L) 11.7 - 15.7 g/dL    Hematocrit 27.0 (L) 35.0 - 47.0 %    MCV 94 78 - 100 fl    MCH 30.7 26.5 - 33.0 pg    MCHC 32.6 31.5 - 36.5 g/dL    RDW 28.4 (H) 10.0 - 15.0 %    Platelet Count 17 (LL) 150 - 450 10e9/L    Diff Method Automated Method     % Neutrophils 72.9 %    % Lymphocytes 14.8 %    % Monocytes 10.9 %    % Eosinophils 0.0 %    % Basophils 0.0 %    % Immature Granulocytes 1.4 %    Nucleated RBCs 0 0 /100    Absolute Neutrophil 2.1 1.6 - 8.3 10e9/L    Absolute Lymphocytes 0.4 (L) 0.8 - 5.3 10e9/L    Absolute Monocytes 0.3 0.0 - 1.3 10e9/L    Absolute Eosinophils 0.0 0.0 - 0.7 10e9/L    Absolute Basophils 0.0 0.0 - 0.2 10e9/L    Abs Immature Granulocytes 0.0 0 - 0.4 10e9/L    Absolute Nucleated RBC 0.0     Platelet Estimate Confirming automated cell count    Fibrinogen activity   Result Value Ref Range    Fibrinogen 282 200 - 420 mg/dL   INR   Result Value Ref Range    INR 1.33 (H) 0.86 - 1.14   Lactate Dehydrogenase   Result Value Ref Range    Lactate Dehydrogenase 488 (H) 81 - 234 U/L   Partial thromboplastin time   Result Value Ref Range    PTT 26 22 - 37 sec   Phosphorus   Result Value Ref Range    Phosphorus 3.0 2.5 - 4.5 mg/dL   Uric acid   Result Value Ref Range    Uric Acid 2.8 2.6 - 6.0 mg/dL   Magnesium   Result Value Ref Range     Magnesium 2.1 1.6 - 2.3 mg/dL   Digoxin level   Result Value Ref Range    Digoxin Level 1.1 0.5 - 2.0 ug/L   ABO/Rh type and screen (PCU Collect)   Result Value Ref Range    ABO O     RH(D)  Neg     Antibody Screen Neg     Test Valid Only At       [facility]    Specimen Expires 08/02/2017    Hepatic panel   Result Value Ref Range    Bilirubin Direct 1.1 (H) 0.0 - 0.2 mg/dL    Bilirubin Total 1.9 (H) 0.2 - 1.3 mg/dL    Albumin 2.6 (L) 3.4 - 5.0 g/dL    Protein Total 5.9 (L) 6.8 - 8.8 g/dL    Alkaline Phosphatase 300 (H) 40 - 150 U/L    ALT 43 0 - 50 U/L    AST 81 (H) 0 - 45 U/L   Glucose by meter   Result Value Ref Range    Glucose 230 (H) 70 - 99 mg/dL

## 2017-07-30 NOTE — PLAN OF CARE
Problem: Individualization  Goal: Patient Preferences  Outcome: No Change  Patient slept well through the night  No complaint of pain  Potassium given for a level of 3.3, prior to giving 40 meq of potassium redrew level for AM labs potassium level 3.4, no additional potassium given.   Continuous chemo infusing, patient complained of right leg being restless which is new, questioning if it could be a side effect of chemo's.   HR continues to be shelton pt is asymptomatic. New order to reduce atenolol to 25 mg daily (order was placed at 22:30 so last evening patient did receive a second dose per orders at the time)  Continue with current POC    Problem: Chemotherapy Effects (Adult)  Goal: Signs and Symptoms of Listed Potential Problems Will be Absent or Manageable (Chemotherapy Effects)  Signs and symptoms of listed potential problems will be absent or manageable by discharge/transition of care (reference Chemotherapy Effects (Adult) CPG).   Outcome: No Change    07/29/17 1949   Chemotherapy Effects   Problems Assessed (Chemotherapy Effects) all   Problems Present (Chemotherapy Effects) fatigue

## 2017-07-30 NOTE — PROVIDER NOTIFICATION
Cardiac monitoring showed pt was running in Afib, pt was checked and pt is asymptomatic. Dr. Soto was notified and md stated to continue to monitor pt and to let him know if pt becomes symptomatic or if Hr consistently goes above 110.

## 2017-07-31 ENCOUNTER — APPOINTMENT (OUTPATIENT)
Dept: OCCUPATIONAL THERAPY | Facility: CLINIC | Age: 57
DRG: 871 | End: 2017-07-31
Attending: INTERNAL MEDICINE
Payer: COMMERCIAL

## 2017-07-31 LAB
ALBUMIN SERPL-MCNC: 2.6 G/DL (ref 3.4–5)
ALP SERPL-CCNC: 295 U/L (ref 40–150)
ALT SERPL W P-5'-P-CCNC: 55 U/L (ref 0–50)
ANION GAP SERPL CALCULATED.3IONS-SCNC: 10 MMOL/L (ref 3–14)
APTT PPP: 26 SEC (ref 22–37)
AST SERPL W P-5'-P-CCNC: 80 U/L (ref 0–45)
BASOPHILS # BLD AUTO: 0 10E9/L (ref 0–0.2)
BASOPHILS NFR BLD AUTO: 0 %
BILIRUB DIRECT SERPL-MCNC: 0.9 MG/DL (ref 0–0.2)
BILIRUB SERPL-MCNC: 1.7 MG/DL (ref 0.2–1.3)
BUN SERPL-MCNC: 36 MG/DL (ref 7–30)
CALCIUM SERPL-MCNC: 7.7 MG/DL (ref 8.5–10.1)
CHLORIDE SERPL-SCNC: 96 MMOL/L (ref 94–109)
CO2 SERPL-SCNC: 33 MMOL/L (ref 20–32)
CREAT SERPL-MCNC: 0.58 MG/DL (ref 0.52–1.04)
DIFFERENTIAL METHOD BLD: ABNORMAL
EOSINOPHIL # BLD AUTO: 0 10E9/L (ref 0–0.7)
EOSINOPHIL NFR BLD AUTO: 0 %
ERYTHROCYTE [DISTWIDTH] IN BLOOD BY AUTOMATED COUNT: 27.6 % (ref 10–15)
FIBRINOGEN PPP-MCNC: 208 MG/DL (ref 200–420)
GFR SERPL CREATININE-BSD FRML MDRD: ABNORMAL ML/MIN/1.7M2
GLUCOSE BLDC GLUCOMTR-MCNC: 224 MG/DL (ref 70–99)
GLUCOSE BLDC GLUCOMTR-MCNC: 256 MG/DL (ref 70–99)
GLUCOSE BLDC GLUCOMTR-MCNC: 288 MG/DL (ref 70–99)
GLUCOSE BLDC GLUCOMTR-MCNC: 307 MG/DL (ref 70–99)
GLUCOSE SERPL-MCNC: 207 MG/DL (ref 70–99)
HCT VFR BLD AUTO: 25.7 % (ref 35–47)
HGB BLD-MCNC: 8.3 G/DL (ref 11.7–15.7)
IMM GRANULOCYTES # BLD: 0 10E9/L (ref 0–0.4)
IMM GRANULOCYTES NFR BLD: 0.9 %
INR PPP: 1.36 (ref 0.86–1.14)
LDH SERPL L TO P-CCNC: 366 U/L (ref 81–234)
LYMPHOCYTES # BLD AUTO: 0.3 10E9/L (ref 0.8–5.3)
LYMPHOCYTES NFR BLD AUTO: 15 %
MAGNESIUM SERPL-MCNC: 1.9 MG/DL (ref 1.6–2.3)
MCH RBC QN AUTO: 30.4 PG (ref 26.5–33)
MCHC RBC AUTO-ENTMCNC: 32.3 G/DL (ref 31.5–36.5)
MCV RBC AUTO: 94 FL (ref 78–100)
MONOCYTES # BLD AUTO: 0.1 10E9/L (ref 0–1.3)
MONOCYTES NFR BLD AUTO: 6.2 %
NEUTROPHILS # BLD AUTO: 1.8 10E9/L (ref 1.6–8.3)
NEUTROPHILS NFR BLD AUTO: 77.9 %
NRBC # BLD AUTO: 0 10*3/UL
NRBC BLD AUTO-RTO: 0 /100
PHOSPHATE SERPL-MCNC: 2.4 MG/DL (ref 2.5–4.5)
PLATELET # BLD AUTO: 20 10E9/L (ref 150–450)
POTASSIUM SERPL-SCNC: 2.7 MMOL/L (ref 3.4–5.3)
POTASSIUM SERPL-SCNC: 3.2 MMOL/L (ref 3.4–5.3)
POTASSIUM SERPL-SCNC: 3.5 MMOL/L (ref 3.4–5.3)
PROT SERPL-MCNC: 5.6 G/DL (ref 6.8–8.8)
RBC # BLD AUTO: 2.73 10E12/L (ref 3.8–5.2)
SODIUM SERPL-SCNC: 139 MMOL/L (ref 133–144)
URATE SERPL-MCNC: 3.1 MG/DL (ref 2.6–6)
WBC # BLD AUTO: 2.3 10E9/L (ref 4–11)

## 2017-07-31 PROCEDURE — 40000133 ZZH STATISTIC OT WARD VISIT

## 2017-07-31 PROCEDURE — 80076 HEPATIC FUNCTION PANEL: CPT | Performed by: PHYSICIAN ASSISTANT

## 2017-07-31 PROCEDURE — 85610 PROTHROMBIN TIME: CPT | Performed by: PHYSICIAN ASSISTANT

## 2017-07-31 PROCEDURE — 80048 BASIC METABOLIC PNL TOTAL CA: CPT | Performed by: PHYSICIAN ASSISTANT

## 2017-07-31 PROCEDURE — 83735 ASSAY OF MAGNESIUM: CPT | Performed by: PHYSICIAN ASSISTANT

## 2017-07-31 PROCEDURE — 00000146 ZZHCL STATISTIC GLUCOSE BY METER IP

## 2017-07-31 PROCEDURE — 25000132 ZZH RX MED GY IP 250 OP 250 PS 637: Performed by: STUDENT IN AN ORGANIZED HEALTH CARE EDUCATION/TRAINING PROGRAM

## 2017-07-31 PROCEDURE — 84100 ASSAY OF PHOSPHORUS: CPT | Performed by: PHYSICIAN ASSISTANT

## 2017-07-31 PROCEDURE — 25000128 H RX IP 250 OP 636: Performed by: INTERNAL MEDICINE

## 2017-07-31 PROCEDURE — 25000131 ZZH RX MED GY IP 250 OP 636 PS 637: Performed by: INTERNAL MEDICINE

## 2017-07-31 PROCEDURE — 25000132 ZZH RX MED GY IP 250 OP 250 PS 637: Performed by: INTERNAL MEDICINE

## 2017-07-31 PROCEDURE — 97535 SELF CARE MNGMENT TRAINING: CPT | Mod: GO

## 2017-07-31 PROCEDURE — 25000125 ZZHC RX 250: Performed by: INTERNAL MEDICINE

## 2017-07-31 PROCEDURE — 83615 LACTATE (LD) (LDH) ENZYME: CPT | Performed by: PHYSICIAN ASSISTANT

## 2017-07-31 PROCEDURE — 20000002 ZZH R&B BMT INTERMEDIATE

## 2017-07-31 PROCEDURE — 85384 FIBRINOGEN ACTIVITY: CPT | Performed by: PHYSICIAN ASSISTANT

## 2017-07-31 PROCEDURE — 84132 ASSAY OF SERUM POTASSIUM: CPT | Performed by: PHYSICAL MEDICINE & REHABILITATION

## 2017-07-31 PROCEDURE — 84550 ASSAY OF BLOOD/URIC ACID: CPT | Performed by: PHYSICIAN ASSISTANT

## 2017-07-31 PROCEDURE — 25000132 ZZH RX MED GY IP 250 OP 250 PS 637: Performed by: NURSE PRACTITIONER

## 2017-07-31 PROCEDURE — 84132 ASSAY OF SERUM POTASSIUM: CPT | Performed by: INTERNAL MEDICINE

## 2017-07-31 PROCEDURE — 25000132 ZZH RX MED GY IP 250 OP 250 PS 637

## 2017-07-31 PROCEDURE — 85025 COMPLETE CBC W/AUTO DIFF WBC: CPT | Performed by: PHYSICIAN ASSISTANT

## 2017-07-31 PROCEDURE — 85730 THROMBOPLASTIN TIME PARTIAL: CPT | Performed by: PHYSICIAN ASSISTANT

## 2017-07-31 PROCEDURE — 25000132 ZZH RX MED GY IP 250 OP 250 PS 637: Performed by: PHYSICIAN ASSISTANT

## 2017-07-31 PROCEDURE — 25000128 H RX IP 250 OP 636: Performed by: PHYSICIAN ASSISTANT

## 2017-07-31 RX ORDER — PROMETHAZINE HYDROCHLORIDE 25 MG/1
25 TABLET ORAL EVERY 8 HOURS
Status: DISCONTINUED | OUTPATIENT
Start: 2017-07-31 | End: 2017-08-02

## 2017-07-31 RX ORDER — DIPHENHYDRAMINE HCL 25 MG
25 CAPSULE ORAL EVERY 6 HOURS PRN
Status: DISCONTINUED | OUTPATIENT
Start: 2017-07-31 | End: 2017-08-04 | Stop reason: HOSPADM

## 2017-07-31 RX ORDER — POTASSIUM CHLORIDE 29.8 MG/ML
20 INJECTION INTRAVENOUS DAILY
Status: DISCONTINUED | OUTPATIENT
Start: 2017-08-01 | End: 2017-08-01

## 2017-07-31 RX ORDER — PROMETHAZINE HYDROCHLORIDE 25 MG/ML
25 INJECTION, SOLUTION INTRAMUSCULAR; INTRAVENOUS EVERY 8 HOURS
Status: DISCONTINUED | OUTPATIENT
Start: 2017-07-31 | End: 2017-07-31

## 2017-07-31 RX ADMIN — MULTIPLE VITAMINS W/ MINERALS TAB 1 TABLET: TAB at 08:24

## 2017-07-31 RX ADMIN — PREDNISONE 50 MG: 50 TABLET ORAL at 08:23

## 2017-07-31 RX ADMIN — Medication 2 G: at 05:18

## 2017-07-31 RX ADMIN — THIAMINE HCL (VITAMIN B1) 50 MG TABLET 50 MG: at 08:23

## 2017-07-31 RX ADMIN — PROMETHAZINE HYDROCHLORIDE 25 MG: 25 INJECTION INTRAMUSCULAR; INTRAVENOUS at 14:01

## 2017-07-31 RX ADMIN — ALLOPURINOL 300 MG: 300 TABLET ORAL at 08:23

## 2017-07-31 RX ADMIN — FUROSEMIDE 5 MG/HR: 10 INJECTION, SOLUTION INTRAVENOUS at 05:19

## 2017-07-31 RX ADMIN — MICONAZOLE NITRATE: 2 POWDER TOPICAL at 08:41

## 2017-07-31 RX ADMIN — ETOPOSIDE 40 MG: 20 INJECTION, SOLUTION, CONCENTRATE INTRAVENOUS at 16:34

## 2017-07-31 RX ADMIN — GABAPENTIN 200 MG: 100 CAPSULE ORAL at 08:23

## 2017-07-31 RX ADMIN — DOXORUBICIN HYDROCHLORIDE 0.66 MG: 2 INJECTION, SOLUTION INTRAVENOUS at 16:36

## 2017-07-31 RX ADMIN — PROMETHAZINE HYDROCHLORIDE 25 MG: 25 TABLET ORAL at 19:40

## 2017-07-31 RX ADMIN — Medication: at 17:02

## 2017-07-31 RX ADMIN — PREDNISONE 50 MG: 50 TABLET ORAL at 16:58

## 2017-07-31 RX ADMIN — CALCIUM CARBONATE 600 MG (1,500 MG)-VITAMIN D3 400 UNIT TABLET 2 TABLET: at 08:40

## 2017-07-31 RX ADMIN — GABAPENTIN 200 MG: 100 CAPSULE ORAL at 20:55

## 2017-07-31 RX ADMIN — FOLIC ACID 1 MG: 1 TABLET ORAL at 08:22

## 2017-07-31 RX ADMIN — POTASSIUM CHLORIDE 20 MEQ: 750 TABLET, EXTENDED RELEASE ORAL at 11:13

## 2017-07-31 RX ADMIN — MIDODRINE HYDROCHLORIDE 10 MG: 5 TABLET ORAL at 08:24

## 2017-07-31 RX ADMIN — ACYCLOVIR 400 MG: 400 TABLET ORAL at 19:40

## 2017-07-31 RX ADMIN — Medication 2 G: at 17:03

## 2017-07-31 RX ADMIN — POTASSIUM CHLORIDE 20 MEQ: 29.8 INJECTION, SOLUTION INTRAVENOUS at 16:30

## 2017-07-31 RX ADMIN — ACYCLOVIR 400 MG: 400 TABLET ORAL at 08:23

## 2017-07-31 RX ADMIN — MIDODRINE HYDROCHLORIDE 10 MG: 5 TABLET ORAL at 12:50

## 2017-07-31 RX ADMIN — SODIUM PHOSPHATE, MONOBASIC, MONOHYDRATE AND SODIUM PHOSPHATE, DIBASIC, ANHYDROUS 15 MMOL: 276; 142 INJECTION, SOLUTION INTRAVENOUS at 08:44

## 2017-07-31 RX ADMIN — MIDODRINE HYDROCHLORIDE 10 MG: 5 TABLET ORAL at 18:52

## 2017-07-31 RX ADMIN — GABAPENTIN 200 MG: 100 CAPSULE ORAL at 14:00

## 2017-07-31 RX ADMIN — ATENOLOL 25 MG: 25 TABLET ORAL at 08:24

## 2017-07-31 RX ADMIN — POTASSIUM CHLORIDE 20 MEQ: 29.8 INJECTION, SOLUTION INTRAVENOUS at 22:38

## 2017-07-31 RX ADMIN — DIGOXIN 125 MCG: 125 TABLET ORAL at 08:24

## 2017-07-31 RX ADMIN — POTASSIUM CHLORIDE 40 MEQ: 750 TABLET, EXTENDED RELEASE ORAL at 05:18

## 2017-07-31 RX ADMIN — POTASSIUM CHLORIDE 40 MEQ: 750 TABLET, EXTENDED RELEASE ORAL at 07:09

## 2017-07-31 RX ADMIN — PANTOPRAZOLE SODIUM 40 MG: 40 TABLET, DELAYED RELEASE ORAL at 08:40

## 2017-07-31 RX ADMIN — DIPHENHYDRAMINE HYDROCHLORIDE 50 MG: 50 INJECTION, SOLUTION INTRAMUSCULAR; INTRAVENOUS at 15:38

## 2017-07-31 RX ADMIN — POTASSIUM CHLORIDE 20 MEQ: 29.8 INJECTION, SOLUTION INTRAVENOUS at 14:02

## 2017-07-31 RX ADMIN — POTASSIUM CHLORIDE 20 MEQ: 750 TABLET, EXTENDED RELEASE ORAL at 16:58

## 2017-07-31 RX ADMIN — FLUCONAZOLE 200 MG: 200 TABLET ORAL at 08:24

## 2017-07-31 NOTE — PLAN OF CARE
Problem: Goal Outcome Summary  Goal: Goal Outcome Summary  1. Pt will have fevers well controlled  2. Pt will be hemodynamically stable   Outcome: No Change  Afebrile. Continues on tele-afib with occasional PVCs. Bradycardic, down to HR 39 bpm during sleep. Recovers to mid 40's-70's while awake. MD notified. Pt asymptomatic. OVSS. Tolerating continues chemo well. C/o of mild nausea, relieved with IV phenergan. C/o of restless leg after phenergan was given. MD notified. IV benadryl given with intermittent relief. Sodium phos and potassium replaced per electrolyte SS. LE edema. Wearing compression stockings. Continues on lasix gtt. Huynh intact. Good urine output. Denies V/D. -288, covered with SSI. Eating and drinking well. Up in chair. Worked with PT/OT. Pt offered no further concerns.

## 2017-07-31 NOTE — CONSULTS
[facility]   PM&R CONSULT - FOLLOW-UP    Consulting Provider: Kalpana Wren PA-C   Reason for Consult: ARU recommendation from PT  Location of Patient: [id number]  Date of Encounter: 7/31/2017     Information within this consult was gathered from patient interview and careful chart review.     ASSESSMENT/PLAN:    Ms. Amelia Michel is a  56 year old right hand dominant female who is know to PM&R service.  She has a complicated past medical history including cardiac arrest May 29, 2017 requiring ECMO with post-arrest multiorgan failure including pancytopenia, shock liver, hypoxic respiratory failure, acute kidney injury, anoxic brain injury and critical illness myopathy in addition to ESBL UTI and aspiration pneumonia.  She also has a history of rheumatoid arthritis, a-fib, VSD repair in 1969 and neuropathy of unknown etiology.  She was admitted to rehab June 16-19, 2017 when she was transferred back to acute hospital for fever/sepsis.  She was unfortunately then diagnosed with stage IV diffuse large B cell lymphoma. Currently, her chemo therapy plan is for current round to finish this week, and then for her next round of chemotherapy to begin in 4 weeks. Per Haem-Onc team, their goal is curative treatment.    At present, she remains below baseline in terms of her functional status.  She has deficits in strength, mobility, ADLs, and activity tolerance that are well below baseline. She also has ongoing medical needs, with bradycardia, potassium and blood count monitoring needed due to chemotherapy.  We feel she would benefit from the intensive therapy offered in an acute rehab setting.     1. Recommend transfer to acute rehab unit once medically appropriate per primary team.     2. Continue PT/OT in the interim.     Attending's note   Thank you for allowing us to participate in the care of this patient.   We were asked to see this patient by Kalpana Wren PA-C  to evaluate  rehabilitation needs.   I saw and evaluated this patient today. I reviewed today's vitals signs, lab values, imaging, medications, and current issues including medical, functional and social.   I have reviewed the residents note and agree with the plan of care listed above.   Amelia Michel is a 56 year old female who is well known to us the PM&R Service from previous consult and ARU visit in July 16-19, when she was transferred to the [facility] for fever. Since then she has been diagnosed with B cell lymphoma. She is currently undergoing chemotherapy, which should be completed tommorow Tuesday. However she has bradycardia which needs to be addressed. She has   She continues to have Left hand dexterity impairments, and impaired balance as well as impaired endurance. She is very motivated. Due to the medical complexity of her case, we recommend resuming intense rehabilitation in ARU setting. She is agreeable. Her  Wolf is at her bedside and endorses assistance if needed.   ELOS is 1-2 weeks  Therapy needs are PT/OT 90 min daily in each discipline and rehab nursing.      My floor time today for this patient;  65 minutes, more than 50% of which was spent in coordination of care and counseling.     Priscilla Shanks MD          HISTORY OF PRESENTING ILLNESS:      Ms. Amelia Michel is a 56 year old right hand dominant female with a past medical history of  rheumatoid arthritis, a-fib, VSD repair in 1969 and neuropathy of unknown etiology. She unfortunately experienced a sudden cardiac arrest of unclear etiology May 29, 2017 requiring ECMO with post-arrest multiorgan failure including pancytopenia, shock liver, hypoxic respiratory failure, acute kidney injury, anoxic brain injury and critical illness myopathy in addition to ESBL UTI and aspiration pneumonia.    She was admitted to rehab June 16-19, 2017 and was good rehab candidate however she was transferred back to acute hospital for fever/sepsis workup.  She was  unfortunately then diagnosed with stage IV diffuse large B cell lymphoma. She has been receiving chemotherapy and currently, her chemo therapy plan is for current round to finish this week, and then for her next round of chemotherapy to begin in 4 weeks. Per Haem-Onc team, their goal is curative treatment.      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 She was independent with all ADLs and IADLs prior to May 29, 2017 cardiac arrest.     CURRENT FUNCTION     PT:    Pt ambulated 2 x ~250' with FWW and supervision. Taking brief sitting rest break during each bout. She performed seated TE to promote LE strengthening and ROM. Educated pt and SO on lab values and implications for exercises - will likely need reinforcement. Tolerated session well today, but remains limited by dec activity tolerance.     OT:    Patient ambulates and performs sit to stand SBA with FWW. Patient with decreased L hand coordination and strength impacting lower body dressing and dexterity with fine motor tasks. Patient tolerates standing at sink for modified bathing ~20 min, requires seated rest break after cleaning periarea due to anterior lean. Patient dons brief with set up assist, mod A to don socks.      SOCIAL HISTORY/HOME SETTING/SUPPORT:   Lives with her  in a house with 2 stairs to enter and 0 stairs within.  is a former respiratory therapist now involved in research per chart review; he works but can take time off as needed.       Social History     Social History     Marital status:      Spouse name: Romeo Michel     Number of children: 0     Years of education: N/A     Occupational History      [employer]     Social History Main Topics     Smoking status: Never Smoker     Smokeless tobacco: Never Used     Alcohol use Yes      Comment: Glass of wine two evenings a night     Drug use: No     Sexual activity: Not on file     Other Topics  Concern     Parent/Sibling W/ Cabg, Mi Or Angioplasty Before 65f 55m? No     Social History Narrative         PAST MEDICAL HISTORY:  Past Medical History:   Diagnosis Date     Hypertension      Rheumatoid arthritis(714.0)          CURRENT MEDICATIONS:  Current Facility-Administered Medications   Medication     insulin aspart (NovoLOG) inj (RAPID ACTING)     insulin aspart (NovoLOG) inj (RAPID ACTING)     potassium chloride SA (K-DUR/KLOR-CON M) CR tablet 20 mEq     glucose 40 % gel 15-30 g    Or     dextrose 50 % injection 25-50 mL    Or     glucagon injection 1 mg     sennosides (SENOKOT) tablet 1-2 tablet     atenolol (TENORMIN) tablet 25 mg     melatonin tablet 1-3 mg     etoposide (TOPOSAR) 40 mg in NaCl 0.9 % 552 mL CHEMOTHERAPY     polyethylene glycol (MIRALAX/GLYCOLAX) Packet 17 g     furosemide (LASIX) 100 mg in NaCl 0.9 % 100 mL infusion     allopurinol (ZYLOPRIM) tablet 300 mg     acyclovir (ZOVIRAX) tablet 400 mg     fluconazole (DIFLUCAN) tablet 200 mg     sodium chloride (PF) 0.9% PF flush 5-50 mL     heparin lock flush 10 UNIT/ML injection 2-5 mL     ondansetron (ZOFRAN) tablet 16 mg     [START ON 8/1/2017] fosaprepitant (EMEND) 150 mg in NaCl 0.9 % intermittent infusion     [START ON 8/2/2017] dexamethasone (DECADRON) tablet 8 mg     predniSONE (DELTASONE) tablet 50 mg     Chemotherapy Infusing-Continuous Infusion     [START ON 8/2/2017] filgrastim 15 mcg/mL (in Dextrose) (NEUPOGEN) infusion 350 mcg     prochlorperazine (COMPAZINE) tablet 10 mg     prochlorperazine (COMPAZINE) injection 10 mg     LORazepam (ATIVAN) tablet 0.5-1 mg     LORazepam (ATIVAN) injection 0.5-1 mg     MEDICATION INSTRUCTION     methylPREDNISolone sodium succinate (solu-MEDROL) injection 125 mg     diphenhydrAMINE (BENADRYL) injection 50 mg     meperidine (DEMEROL) injection 25 mg     EPINEPHrine (ADRENALIN) injection 0.3 mg     albuterol (PROAIR HFA/PROVENTIL HFA/VENTOLIN HFA) Inhaler 1-2 puff     albuterol neb solution 2.5 mg      0.9% sodium chloride infusion     vinCRIStine (ONCOVIN) 0.66 mg, DOXOrubicin (ADRIAMYCIN) 17 mg in NaCl 0.9 % 1,059 mL CHEMOTHERAPY     [START ON 8/1/2017] cyclophosphamide (CYTOXAN) 1,240 mg in NaCl 0.9 % 612 mL CHEMOTHERAPY     sodium chloride (PF) 0.9% PF flush 10-20 mL     sodium chloride (PF) 0.9% PF flush 10 mL     potassium chloride SA (K-DUR/KLOR-CON M) CR tablet 20-40 mEq     potassium chloride (KLOR-CON) Packet 20-40 mEq     potassium chloride 10 mEq in 100 mL intermittent infusion     potassium chloride 10 mEq in 100 mL intermittent infusion with 10 mg lidocaine     potassium chloride 20 mEq in 50 mL intermittent infusion     [START ON 8/4/2017] predniSONE (DELTASONE) tablet 5 mg     traZODone (DESYREL) quarter-tab 12.5 mg     morphine 0.1% in solosite topical gel 2 g     sodium chloride (PF) 0.9% PF flush 1-74 mL     acetaminophen (TYLENOL) tablet 650 mg     thiamine tablet 50 mg     folic acid (FOLVITE) tablet 1 mg     midodrine (PROAMATINE) tablet 10 mg     miconazole (MICATIN; MICRO GUARD) 2 % powder     pantoprazole (PROTONIX) EC tablet 40 mg     digoxin (LANOXIN) tablet 125 mcg     ondansetron (ZOFRAN) tablet 4 mg     ondansetron (ZOFRAN) injection 4 mg     simethicone (MYLICON) chewable tablet 80 mg     sodium chloride (PF) 0.9% PF flush 10-20 mL     sodium chloride (PF) 0.9% PF flush 10 mL     heparin lock flush 10 UNIT/ML injection 5-10 mL     dextrose 10 % 1,000 mL infusion     gabapentin (NEURONTIN) capsule 200 mg     multivitamin, therapeutic with minerals (THERA-VIT-M) tablet 1 tablet     sodium chloride (PF) 0.9% PF flush 3 mL     medication instruction     naloxone (NARCAN) injection 0.1-0.4 mg     calcium-vitamin D (CALTRATE) 600-400 MG-UNIT per tablet 2 tablet     morphine 0.1% in solosite topical gel     magnesium sulfate 2 g in NS intermittent infusion (PharMEDium or FV Cmpd)     magnesium sulfate 4 g in 100 mL sterile water (premade)     sodium phosphate 10 mmol in D5W intermittent  "infusion     sodium phosphate 15 mmol in D5W intermittent infusion     sodium phosphate 20 mmol in D5W intermittent infusion     sodium phosphate 25 mmol in D5W intermittent infusion     traMADol (ULTRAM) half-tab 25 mg         REVIEW OF SYSTEMS:  General: feels okay overall, all things considered  Head: denies headache  Eyes/Nose/Throat: wears glasses but denies change in vision, sore throat or difficulty chewing/swallowing  Cardiovascular: notes that she can feel palpitations when she is in a-fib which is usually daily and can last up to an hour, denies chest pain  Pulmonary: denies cough or shortness of breath  Gastrointestinal: denies nausea, vomiting, diarrhea or constipation  Extremities: endorses left upper limb swelling, denies joint pain  Neurological: endorses chronic right numbness extending from right heel along posterior limb to right buttocks as well as saddle anesthesia, also endorses tingling over first two digits of left hand which she thinks is secondary to swelling, endorses global weakness but denies focal weakness. Skin: reports chronic wound over left elbow (present since resuscitation May 29th) which is slowling improving, denies rash, pruritis  Psychiatry: endorses stable mood - states that she is taking it \"one day at a time\" and has been doing well with managing her ongoing medical issues this way.       LABORATORY RESULTS   Lab Results   Component Value Date    WBC 2.3 (L) 07/31/2017    HGB 8.3 (L) 07/31/2017    HCT 25.7 (L) 07/31/2017    MCV 94 07/31/2017    PLT 20 (LL) 07/31/2017     Lab Results   Component Value Date     07/31/2017    POTASSIUM 3.2 (L) 07/31/2017    CHLORIDE 96 07/31/2017    CO2 33 (H) 07/31/2017     (H) 07/31/2017     Lab Results   Component Value Date    GFRESTIMATED >90  Non  GFR Calc   07/31/2017    GFRESTBLACK >90   GFR Calc   07/31/2017     Lab Results   Component Value Date    AST 80 (H) 07/31/2017    ALT 55 (H) " 07/31/2017    ALKPHOS 295 (H) 07/31/2017    BILITOTAL 1.7 (H) 07/31/2017    BILICONJ 0.0 12/06/2005     Lab Results   Component Value Date    INR 1.36 (H) 07/31/2017     Lab Results   Component Value Date    BUN 36 (H) 07/31/2017    CR 0.58 07/31/2017         PHYSICAL EXAMINATION:  Vitals:    07/30/17 2259 07/31/17 0819 07/31/17 0854 07/31/17 1125   BP: 137/58 136/69  141/76   BP Location:    Left leg   Pulse:    60   Resp:  20  20   Temp: 97.5  F (36.4  C) 97.6  F (36.4  C)  96.8  F (36  C)   TempSrc: Axillary Axillary  Axillary   SpO2:  93%  95%   Weight:   66.9 kg (147 lb 6.4 oz)    Height:           GENERAL: alert and oriented, no acute distress, conversational and pleasant, lying on bed in hospital room, chemo infusing,  at bedside.   NEUROLOGIC:    - alert and oriented   - extraocular muscles in tact, gaze conjugate, good eye contact, wearing glasses  - sensation in tact to light touch over upper and lower face  - no facial asymmetry or weakness over upper or lower face  - hearing in tact to conversation and rustling sound of gloves beside either ear  - shoulder shrug in tact bilaterally  - strength:    - 4-/5 in left  strength, finger abduction, wrist extension   - 4/5 if left elbow flexion/extension, straight leg raise and plantar/dorsiflexion   - 4/5 in right  strength, finger abduction, straight leg raise, plantar/dorsiflexion and great toe extension  - decreased sensation over digits 1 and 2 of left hand and surrounding elbow, sensation in tact to light touch over remainder of left upper limb and right upper limb  - decreased sensation over posterior surface of right lower limb but intact over anterior surface, also intact over left lower emelia  HEENT: Normocephalic, atraumatic.   CARDIOVASCULAR: regular rate and rhythm, compression socks on for edema - pitting edema appreciated at ankles through socks, extremities well perfused.   PULMONARY/CHEST: speaking in full sentences, no cyanosis, no  respiratory distress, breathing comfortably on room air, symmetrical chest wall expansion, lungs clear to auscultation bilaterally  ABDOMEN: Positive bowel sounds, non-distended, soft, non-tender, no rebound or guarding.  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   SKIN: warm and well perfused  PSYCHIATRIC: affect appropriate to conversation, cooperative, pleasant.     --  Patient discussed with attending Dr. Rimma Stinson-Kasey Wolf MD  PM&R Resident, PGY-4

## 2017-07-31 NOTE — PROGRESS NOTES
Social Work Services Progress Note    Hospital Day: 43  Date of Initial Social Work Evaluation:  6/20/17-[place]  Collaborated with:  Hematology team, [facility] admissions  Data:  Received update from team re: medical status. Anticipate DC towards end of week, PT/OT and PM&R recommendation for ARU.  Intervention:  Follow up with [facility] admissions re: plan of care related to chemo, as DC planning towards ARU, therefore pt unable to leave during ARU rehab stay.    Assessment:  see bedside RN, PT/OT and provider notes  Plan:    Anticipated Disposition:  Facility:  [facility]    Barriers to d/c plan:  n/a    Follow Up:  SW will continue to follow and assist with DC planning      DENNIS Portillo, ANDIE  7D  (Hem/Onc)  Pager: [phone]  7/31/2017

## 2017-07-31 NOTE — PLAN OF CARE
"Problem: Goal Outcome Summary  Goal: Goal Outcome Summary  1. Pt will have fevers well controlled  2. Pt will be hemodynamically stable   PT-5C- Cancel, pt and RN report medication reaction to phenergan having tremors and \"funny feeling in my feet\", will be receiving benadryl, not appropriate for PT at this time.       "

## 2017-07-31 NOTE — PROGRESS NOTES
CLINICAL NUTRITION SERVICES - REASSESSMENT NOTE     Nutrition Prescription    Malnutrition Status:    Criteria not met, but at risk.    Recommendations already ordered by Registered Dietitian (RD):  1.  Cafe passes for menu fatigue.   2.  Updated healthtouch with menu preferences.    Future/Additional Recommendations:  1.  Continue diet per providers. May consider liberation to a 3 gram sodium diet if intake poor while fluid status remains stable.  2.  If side effects of cancer treatment develop d/t meds and pt eating poor, start calorie counts.  3.  Continue calcium/vitamin D as ordered while on predinsone. Monitor BG control closely.  4.  Following healing of wounds. If not healing, consider restarting wound healing vitamins again.  5.  Consider PRN salivant (ex: magic mouth wash) for dry mouth.         EVALUATION OF THE PROGRESS TOWARD GOALS   Diet:  2 Gram Sodium; PRN supplement/snack with meals    Intake:   Good. Eating % of meals with a good appetite. Per pt, has altered taste and only likes to eat cold food at this time. Also reports a dry mouth. Pt reports menu fatigue as well given LOS now 42 days.         NEW FINDINGS   -Wt:  New dry wt of 66.9 kg (7/31/17). Loss of  12% from admission wt in >1 month (suspect partially fluid status). Nutrition needs updated below.  -Skin:  Per WOCN 7/26: Large area of epidermal loss with potential full thickness skin loss at area of necrosis 2/2 extravastion.  Measurements improvewd and slough is loosening well with use of Medihoney. No PI noted.  -Meds:  Started treatment for DLBCL on 7/28/17.     ASSESSED NUTRITION NEEDS using 49 kg dosing wt  Estimated Energy Needs: 1225 - 1470 kcals/day (25 - 30 kcals/kg)  Justification: Rec low end of range at this time (decreased needs with wt status but increased needs due to stress factors, healing)  Estimated Protein Needs: 59 - 74 grams protein/day (1.2 - 1.5 grams of pro/kg)  Justification: Increased needs due to stress  factors, healing    MALNUTRITION  % Intake: No decreased intake noted  % Weight Loss: Difficult to assess with fluid status  Subcutaneous Fat Loss: None observed  Muscle Loss: None observed  Fluid Accumulation/Edema: Mild +1 generalized edema noted per chart  Malnutrition Diagnosis: Patient does not meet two of the above criteria necessary for diagnosing malnutrition but is at risk for malnutrition    Previous Goals   Patient to consume % of nutritionally adequate meal trays TID, or the equivalent with supplements/snacks.  Evaluation:  Suspect met.    Previous Nutrition Diagnosis  Inadequate oral intake   Evaluation:  No longer applicable, nutrition diagnosis changed below    CURRENT NUTRITION DIAGNOSIS  Predicted inadequate nutrient intake (protein-calories) related to prolonged LOS, critical illness with increased needs for wound healing, and now may have side effects of cancer treatment (n/v/mucositis/ dysgeusia) that could further hinder intake    INTERVENTIONS  Implementation  Collaboration with other nutrition professionals    Goals  Patient to consume % of nutritionally adequate meal trays TID, or the equivalent with supplements/snacks.    Monitoring/Evaluation  Progress toward goals will be monitored and evaluated per protocol.    Jesus Curry RD, LD  5C/BMT Dietitian  Pager: [phone]

## 2017-07-31 NOTE — PROGRESS NOTES
"Palliative Care Inpatient Clinical Social Work Assessment    Patient Information:  Bree is a 56 year old woman with a complex medical history, including cardiac issues and now with a new diagnosis of Diffuse large B cell Lymphoma.  I initially met Bree on Friday and returned today, per her preference, for a longer visit.  I met with Bree and her  Wolf in her room, though part way through the visit Wolf left to take a phone call from work.  Both were verbal and engaged.    Relevant Symptoms/Concerns     Physical:  Physical limitations, some of which Bree expects to be permanent.  Nausea, fatigue from chemotherapy.   Psychological/Emotional/Existential:  Difficulty in adjusting to shift in abilities.  Has always been a \"go-getter.\"  She and  describe her as a \"bulldozer.\"   Family/Social/Caregiver:   Concerns re Wolf.  Says he is inexperienced as a caregiver.   Developmental:  Was employed, strong self concept related to career.   Mental Health:      End of Life:      Cultural/Zoroastrian/Spiritual:      Grief/Loss:  Myriad of losses related to medical issues, not the least of which are independence and self concept.   Concurrent Stressors:        Comments:       Strengths     Physical: Motivated to work with therapies to become stronger, gain function.   Psychological/Emotional/Existential:  Engaged, motivated, open to education re coping/adjustment.   Family/Social/Caregiver:  Supportive relationship with .  Has meaningful relationships, including family and friends.   Developmental:  Has life skills that can be adapted to current situation.   Mental Health:      End of Life:      Cultural/Zoroastrian/Spiritual:      Grief/Loss:  Open to psychoeducation.      Comments:       Goals/Decision Making/Advance Care Planning   Preferences:  Restorative.  Says she is willing to put up with limitations but knows it will be difficult for her to adjust.     Concerns:      Documents:  No health care directive " on file.   Decision Making Issues:  Decisional.     Comments:  Able to define things that are important to her in terms of quality of life, including independence, social relationships.    Resource Needs     Discharge Planning:  Per Unit/Program  and/or Care Coordinator   Other:      Comments:       Sources of Information   Patient:  X   Family:  X   Staff:  X   Chart Review:  X   Other:       Intervention (Check all that apply)    X   Assessment of palliative specific issues          Introduction of Palliative clinical social work interventions    X   Adjustment to illness counseling        Advanced care planning        Attended/participated in care conference        Behavioral interventions for symptom management    X   Facilitation of processing of thoughts/feelings        Family communication facilitated    X   Grief counseling    X   Goals of care discussion/facilitation        Life legacy work        Life review facilitation    X   Psychoeducation    X   Re-framing        Resource referral        Other:       Comments:  Bree is interested in ongoing counseling.  We discussed what would make most sense for her going forward, given that I will only be seeing patients/families for the next three weeks, and she expects to transfer to the [facility] this Thursday.    Plan:  I will see Bree until she transfers to the ARU.  At that point, she will see Edilma Miguel, PhD, LP, for counseling.

## 2017-07-31 NOTE — PLAN OF CARE
Problem: Individualization  Goal: Patient Preferences  Outcome: No Change  Patient slept off and on through the night  No complaint of pain or discomfort  Potassium being replaced oral, will need a recheck at 11:30  Magnesium replace and waiting for phos to arrive from pharmacy.   HR - sinus shelton with periods of afib.   AVSS        Problem: Sepsis (Adult)  Goal: Signs and Symptoms of Listed Potential Problems Will be Absent or Manageable (Sepsis)  Signs and symptoms of listed potential problems will be absent or manageable by discharge/transition of care (reference Sepsis (Adult) CPG).     07/29/17 1700   Sepsis   Problems Assessed (Sepsis) all   Problems Present (Sepsis) other (see comments)  (Dyspnea on exertion)         Problem: Chemotherapy Effects (Adult)  Goal: Signs and Symptoms of Listed Potential Problems Will be Absent or Manageable (Chemotherapy Effects)  Signs and symptoms of listed potential problems will be absent or manageable by discharge/transition of care (reference Chemotherapy Effects (Adult) CPG).   Outcome: No Change    07/30/17 1924   Chemotherapy Effects   Problems Assessed (Chemotherapy Effects) all   Problems Present (Chemotherapy Effects) fatigue

## 2017-07-31 NOTE — PLAN OF CARE
Problem: Chemotherapy Effects (Adult)  Goal: Signs and Symptoms of Listed Potential Problems Will be Absent or Manageable (Chemotherapy Effects)  Signs and symptoms of listed potential problems will be absent or manageable by discharge/transition of care (reference Chemotherapy Effects (Adult) CPG).     07/30/17 1908   Chemotherapy Effects   Problems Assessed (Chemotherapy Effects) all   Problems Present (Chemotherapy Effects) diarrhea;fatigue   A&Ox4.Pt has been running Afib since about 1 pm( Hr running in the 60's, 80's and at times to 110's for few seconds and back to the 50's mostly) per cardiac monitor reading, pt denied any symptoms.Dr. Soto was notified about Afib and md stated to continue to monitor pt and to let him know if pt becomes symptomatic or if Hr consistently goes above 110.Denied nausea, good appetite. Lasix drip is infusing at 5mg/hr, good urine output from brand. Left arm dressing got changed.Pt used bed pan x2, incontinence of stool x1.  was here, very supportive of pt.

## 2017-07-31 NOTE — PROGRESS NOTES
[facility], [place]    Hematology / Oncology Progress Note    Date of Service (when I saw the patient): 07/31/2017     Assessment & Plan   Amelia Michel is a 56 year old female who was admitted on 6/19/2017. She has complex past medical history of prior VSD repair, rheumatoid arthritis on long-term methotrexate, recent dx atrial fibrillation/flutter/SVT who was recently admitted to [facility] on 5/29 after an out of hospital cardiac arrest, admission complicated by intubation with discharge to TCU. Was discharged to TCU and subsequently readmitted to Cardiology service for FUO. With workup of fever and progressive cytopenias, diagnosis of DLBCL was made. She was transferred to the Hematology service on 7/26. See discussions of diagnosis and reasoning for treatment plan in 7/27 Heme/Onc note. Started Cycle 1 R-EPOCH on 7/28.     HEME:    #DLBCL. Stage 4 with bone marrow and liver involvement. IPI 4. Possibly related to prior methotrexate.   - PET/CT 7/18 revealing for mediastinal and neck level 2A lymphadenopathy, extensive FDG-avid bony lesions, hepatosplenic FDG-avid lesions, pelvic lesions contingous with the uterus, and bilateral pleural effusions.    - Bone marrow biopsy 7/12: scattered plasma cells in perivascular clusters, with no definitive staining of B-cell population by CD5; on re-review, possibly consistent with intravascularge large B-cell lymphoma. However, liver biopsy (7/21) consistent with DLBCL, negative for BCL2/BCL6/MYC rearrangements, CD20 positive.   - PICC line placed    Treatment Plan: DA-EPOCH-R Cycle 1 (Day 1=7/28/17). Today is Day 4.   - reaction to rituxan (hypotension with BP 87/45, SOB/feeling of ?throat swelling/difficulty getting breath in; then fever/rigors). No stridor. No rashes or hives. Received rescue methylpred and demerol).   - Etoposide CIVI on Days 1-4  - Vincristine/Doxorubicin CIVI on Days 1-4  - Prednisone 50mg BID x 5 days  - Cytoxan on Day 5  -  will need Neupogen support starting D6    #Hyperuricemia  #Monitor for TLS.  Uric acid up to 8.2 (7/27 PM), s/p Rasburicase. Uric acid improved to 4.2. Continue Allopurinol. Daily labs given recent stability. Back off on frequency of Mg/Phos/uric acid when appropriate after chemotherapy.     #DIC. INR prolonged, now improving. Fibrinogen currently ok.   - conditional transfusion parameters in place: 2U plasma for INR >1.8, 5U cryo for fibrinogen <150.   - started Vitamin K 10mg PO x 3 days (7/28-7/30)  - adjust coag monitoring to qMWF x 4 occurrences; adjust if needed    #Pancytopenia, secondary to DLBCL  - transfuse to maintain Hgb >8.0 (keep given recent cardiac history), platelets >10,000    ID:   #PPX  - continue ppx ACV, Levaquin, Fluconazole     #Persistent Fevers.  #Lactic acidosis.   Previously followed by ID, who signed off 7/21.  Extensive infectious evaluation including blood cultures, which remain NGTD. Additionally, stool virus panel, M. Tuberculsosis, tick-borne illnesses, and bartonella/Q-fever have all been negative. Fevers/lactate secondary to malignancy.   - possible pneumonia while recently on meropenem (7/1-7/5);  CT chest (7/13) did reveal a right lung tree-in-bud opacities with more consolidative opacities in RLL; but no accompanying focal symptoms. Appears to have received dose of Meropenem on 7/14, now off.   - lactic acid increased to 10.4 (7/28) during Rituxan infusion reaction.   - cultures have been NGTD, avoid additional fluids given hypervolemia, monitor closely    CV/PV:    #Atrial fibrillation/flutter   #Recent out of hospital cardiac arrest (5/29/17)  #Prior VSD repair (1969)  - PET scan suggested FDG-avid lesions leading to thickening of the interatrial septum, extending superiorly along the main pulmonary artery; although cardiac MRI 7/24 shows no evidence of infiltrative disease    - Appreciate Cardiology team recs  - continue Digoxin 125mcg daily. Digoxin level within range on  7/30 AM.   - continue Atenolol 25mg daily (decreased 7/30). Note, per pt/, metoprolol has not worked for her and makes her feel poorly.  PT also notes that she does NOT want amiodarone, as there is concern that this led to her cardiac arrest (and I will look further into this).   - continuous telemetry    #Bradycardia. HRs 39-50s (7/28-7/29). On tele strips and formal EKG 7/29 this appeared to be sinus shelton. D/w Cards team who agree with decreasing atenolol dose. Checked dig level as above. Cardiology team continues to follow.      #Aortic masses. BRE on 7/14 showed 3 small masses on the coronary cusps and LVOT. Do not believe this to be endocarditis; no antibiotics at this time. Unclear what this represents; possibly could be Libmann-Sacks.      #Anasarca, volume overload  - Torsemide 40 mg BID with additional PRN. Per Cards (7/28): changed diuretic to Lasix gtt at 5mg/hr given fluids with CIVI chemo (which is generally around 70cc/hr). This may also help with additional diuresis with increased blood product needs.   - Midodrine to aid in renal perfusion  - 2g Na limit    GI:   #Nausea, mild.   - will now have scheduled antiemetic (Zofran, Emend)) with chemo, PRN Compazine/Ativan    #LFT derangement. 2/2 lymphoma involvement. ALT/AST significantly improved from prior. Alk phos more elevated today, but will monitor for fluctuations. Tbili has been elevated (chemo adjusted), now downtrending.   - monitor daily through chemo, then adjust frequency as indicated    #Constipation vs. diarrhea. Stools harder on 7/28. With scheduled laxatives, pt developed looser stools. Backed off on scheduled bowel regimen. Stools now soft.     NEURO:  #Saddle anesthesia, urinary incontinence. Pt reports this is baseline for her. Unclear etiology. Was initially seen in ED and evaluated by NSG in 03/2016 for this without clear explanation for symptoms. She has been noted to have an S1 radiculopathy. Recently seen by Neuro in  06/2017, placed consult today.    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 HOLDING PTA Prednisone with plan for higher dose steroids on treatment plan. Resume 5mg dose as of 8/4.   - Follow-up with [facility] Rheumatology clinic Dr. Conor Cunha in 4-6 weeks after discharge    ENDO:  #Steroid induced hyperglycemia. Added QID BG check and med SSI.     DERM:   #Extravasation-related LUE wound. Slowly improving.   - WOCN following, appreciate recs    GEN:  #Deconditioning. 2/2 prolonged hospital stays, acute illness/malignancy  - PT/OT. Recent rec for ARU, so PM&R consult placed.     FEN  - 2g Na diet  - Dietician following    #Hypokalemia. 2/2 lasix. Started PO supplement in addition to sliding scale (currently high given cardiac issues/acute illness).     PPx:   - VTE: defer VTE pharmacologic ppx due to thrombocytopenia  - GI: continue protonix    CODE: Full Code    Disposition: Inpatient hospitalization until completion of cycle 1 of chemotherapy and when medically ready. Will need rehab on discharge. PT rec ARU, PM&R to evaluate.      Shyann Marinelli PA-C  Hematology/Oncology  Pager: [phone]    Interval History   No acute events overnight, afebrile. Was noted to be in Afib, however rate controlled and asymptomatic. Tolerating chemo well since initial Rituxan infusion.  is at bedside and supportive. Addressed several questions for patient.    Physical Exam   Temp: 96.5  F (35.8  C) Temp src: Axillary BP: 153/60 Pulse: 60 Heart Rate: 78 Resp: 20 SpO2: 98 % O2 Device: None (Room air)    Vitals:    07/30/17 0535 07/30/17 0751 07/31/17 0854   Weight: 67 kg (147 lb 11.3 oz) 67 kg (147 lb 11.3 oz) 66.9 kg (147 lb 6.4 oz)     Vital Signs with  Ranges  Temp:  [96.5  F (35.8  C)-97.6  F (36.4  C)] 96.5  F (35.8  C)  Pulse:  [60] 60  Heart Rate:  [43-78] 78  Resp:  [16-20] 20  BP: (136-153)/(58-76) 153/60  SpO2:  [93 %-98 %] 98 %  I/O last 3 completed shifts:  In: 3325.3 [P.O.:1280; I.V.:525; IV Piggyback:1520.3]  Out: 4900 [Urine:4900]  Constitutional: Pleasant and cooperative female seen lying in bed. Awake, alert, NAD.  HEENT: NC/AT, EOMI, sclera clear, conjunctiva normal  Respiratory: No increased work of breathing, CTAB, no crackles or wheezing.  Cardiovascular: RRR, no murmur noted. No peripheral edema.  GI: Normal bowel sounds, soft, non-distended and non-tender.  Skin: Warm, dry, well-perfused. No bruising, bleeding, rashes, or lesions on limited exam.  Neurologic: A&O. Answers questions appropriately. Moves all extremities spontaneously.  Neuropsychiatric: Calm, appropriate affect    Medications     furosemide (LASIX) infusion ADULT 5 mg/hr (07/31/17 1100)     - MEDICATION INSTRUCTIONS -       NaCl       IV fluid REPLACEMENT ONLY       - MEDICATION INSTRUCTIONS -         promethazine  25 mg Oral Q8H     insulin aspart  1-10 Units Subcutaneous TID AC     insulin aspart  1-7 Units Subcutaneous At Bedtime     potassium chloride  20 mEq Oral BID     atenolol  25 mg Oral Daily     melatonin  1-3 mg Oral At Bedtime     etoposide (TOPOSAR) chemo infusion  25 mg/m2 (Treatment Plan Recorded) Intravenous Q24H     allopurinol  300 mg Oral Daily     acyclovir  400 mg Oral BID     fluconazole  200 mg Oral Daily     [START ON 8/1/2017] fosaprepitant (EMEND) 150 mg intermittent infusion  150 mg Intravenous Once     [START ON 8/2/2017] dexamethasone  8 mg Oral Daily     predniSONE  30 mg/m2 (Treatment Plan Recorded) Oral BID     Chemotherapy Infusing-Continuous Infusion   Does not apply Q8H     [START ON 8/2/2017] filgrastim (NEUPOGEN/GRANIX) intravenous  5 mcg/kg (Treatment Plan Recorded) Intravenous Daily at 8 pm     vinCRIStine/DOXOrubicin (ONCOVIN/ADRIAMYCIN)  chemo infusion  0.4 mg/m2 (Treatment Plan Recorded) Intravenous Q24H     [START ON 8/1/2017] cyclophosphamide (CYTOXAN) chemo infusion  750 mg/m2 (Treatment Plan Recorded) Intravenous Once     sodium chloride (PF)  10 mL Intracatheter Q7 Days     [START ON 8/4/2017] predniSONE  5 mg Oral Daily     morphine 0.1% in solosite  2 g Transdermal Daily     sodium chloride (PF)  1-74 mL Intracatheter Q8H     vitamin  B-1  50 mg Oral Daily     folic acid  1 mg Oral Daily     midodrine  10 mg Oral TID w/meals     miconazole   Topical BID     pantoprazole  40 mg Oral QAM     digoxin  125 mcg Oral Daily     sodium chloride (PF)  10 mL Intracatheter Q8H     gabapentin  200 mg Oral TID     multivitamin, therapeutic with minerals  1 tablet Oral Daily     calcium-vitamin D  2 tablet Oral Daily       Data   Results for orders placed or performed during the hospital encounter of 06/19/17 (from the past 24 hour(s))   Glucose by meter   Result Value Ref Range    Glucose 233 (H) 70 - 99 mg/dL   Basic metabolic panel   Result Value Ref Range    Sodium 139 133 - 144 mmol/L    Potassium 2.7 (L) 3.4 - 5.3 mmol/L    Chloride 96 94 - 109 mmol/L    Carbon Dioxide 33 (H) 20 - 32 mmol/L    Anion Gap 10 3 - 14 mmol/L    Glucose 207 (H) 70 - 99 mg/dL    Urea Nitrogen 36 (H) 7 - 30 mg/dL    Creatinine 0.58 0.52 - 1.04 mg/dL    GFR Estimate >90  Non  GFR Calc   >60 mL/min/1.7m2    GFR Estimate If Black >90   GFR Calc   >60 mL/min/1.7m2    Calcium 7.7 (L) 8.5 - 10.1 mg/dL   CBC with platelets differential   Result Value Ref Range    WBC 2.3 (L) 4.0 - 11.0 10e9/L    RBC Count 2.73 (L) 3.8 - 5.2 10e12/L    Hemoglobin 8.3 (L) 11.7 - 15.7 g/dL    Hematocrit 25.7 (L) 35.0 - 47.0 %    MCV 94 78 - 100 fl    MCH 30.4 26.5 - 33.0 pg    MCHC 32.3 31.5 - 36.5 g/dL    RDW 27.6 (H) 10.0 - 15.0 %    Platelet Count 20 (LL) 150 - 450 10e9/L    Diff Method Automated Method     % Neutrophils 77.9 %    % Lymphocytes 15.0 %    % Monocytes  6.2 %    % Eosinophils 0.0 %    % Basophils 0.0 %    % Immature Granulocytes 0.9 %    Nucleated RBCs 0 0 /100    Absolute Neutrophil 1.8 1.6 - 8.3 10e9/L    Absolute Lymphocytes 0.3 (L) 0.8 - 5.3 10e9/L    Absolute Monocytes 0.1 0.0 - 1.3 10e9/L    Absolute Eosinophils 0.0 0.0 - 0.7 10e9/L    Absolute Basophils 0.0 0.0 - 0.2 10e9/L    Abs Immature Granulocytes 0.0 0 - 0.4 10e9/L    Absolute Nucleated RBC 0.0    Fibrinogen activity   Result Value Ref Range    Fibrinogen 208 200 - 420 mg/dL   INR   Result Value Ref Range    INR 1.36 (H) 0.86 - 1.14   Lactate Dehydrogenase   Result Value Ref Range    Lactate Dehydrogenase 366 (H) 81 - 234 U/L   Magnesium   Result Value Ref Range    Magnesium 1.9 1.6 - 2.3 mg/dL   Partial thromboplastin time   Result Value Ref Range    PTT 26 22 - 37 sec   Phosphorus   Result Value Ref Range    Phosphorus 2.4 (L) 2.5 - 4.5 mg/dL   Uric acid   Result Value Ref Range    Uric Acid 3.1 2.6 - 6.0 mg/dL   Hepatic panel   Result Value Ref Range    Bilirubin Direct 0.9 (H) 0.0 - 0.2 mg/dL    Bilirubin Total 1.7 (H) 0.2 - 1.3 mg/dL    Albumin 2.6 (L) 3.4 - 5.0 g/dL    Protein Total 5.6 (L) 6.8 - 8.8 g/dL    Alkaline Phosphatase 295 (H) 40 - 150 U/L    ALT 55 (H) 0 - 50 U/L    AST 80 (H) 0 - 45 U/L   Glucose by meter   Result Value Ref Range    Glucose 224 (H) 70 - 99 mg/dL   Potassium   Result Value Ref Range    Potassium 3.2 (L) 3.4 - 5.3 mmol/L   Glucose by meter   Result Value Ref Range    Glucose 256 (H) 70 - 99 mg/dL

## 2017-07-31 NOTE — PLAN OF CARE
Problem: Goal Outcome Summary  Goal: Goal Outcome Summary  1. Pt will have fevers well controlled  2. Pt will be hemodynamically stable   OT 5C: Patient seen for modified bathing and dressing. Patient ambulates and performs sit to stand SBA with FWW. Patient with decreased L hand coordination and strength impacting lower body dressing and dexterity with fine motor tasks. Patient tolerates standing at sink for modified bathing ~20 min, requires seated rest break after cleaning periarea due to anterior lean. Patient dons brief with set up assist, mod A to don socks. Recommend: ARU

## 2017-08-01 ENCOUNTER — APPOINTMENT (OUTPATIENT)
Dept: PHYSICAL THERAPY | Facility: CLINIC | Age: 57
DRG: 871 | End: 2017-08-01
Attending: INTERNAL MEDICINE
Payer: COMMERCIAL

## 2017-08-01 LAB
ABO + RH BLD: NORMAL
ABO + RH BLD: NORMAL
ALBUMIN SERPL-MCNC: 2.9 G/DL (ref 3.4–5)
ALP SERPL-CCNC: 292 U/L (ref 40–150)
ALT SERPL W P-5'-P-CCNC: 77 U/L (ref 0–50)
ANION GAP SERPL CALCULATED.3IONS-SCNC: 10 MMOL/L (ref 3–14)
AST SERPL W P-5'-P-CCNC: 93 U/L (ref 0–45)
BASOPHILS # BLD AUTO: 0 10E9/L (ref 0–0.2)
BASOPHILS NFR BLD AUTO: 0 %
BILIRUB DIRECT SERPL-MCNC: 0.8 MG/DL (ref 0–0.2)
BILIRUB SERPL-MCNC: 1.4 MG/DL (ref 0.2–1.3)
BLD GP AB SCN SERPL QL: NORMAL
BLD PROD TYP BPU: NORMAL
BLD PROD TYP BPU: NORMAL
BLD UNIT ID BPU: 0
BLOOD BANK CMNT PATIENT-IMP: NORMAL
BLOOD PRODUCT CODE: NORMAL
BPU ID: NORMAL
BUN SERPL-MCNC: 30 MG/DL (ref 7–30)
CALCIUM SERPL-MCNC: 7.5 MG/DL (ref 8.5–10.1)
CHLORIDE SERPL-SCNC: 98 MMOL/L (ref 94–109)
CO2 SERPL-SCNC: 32 MMOL/L (ref 20–32)
CREAT SERPL-MCNC: 0.54 MG/DL (ref 0.52–1.04)
DIFFERENTIAL METHOD BLD: ABNORMAL
EOSINOPHIL # BLD AUTO: 0 10E9/L (ref 0–0.7)
EOSINOPHIL NFR BLD AUTO: 0 %
ERYTHROCYTE [DISTWIDTH] IN BLOOD BY AUTOMATED COUNT: 26.6 % (ref 10–15)
GFR SERPL CREATININE-BSD FRML MDRD: ABNORMAL ML/MIN/1.7M2
GLUCOSE BLDC GLUCOMTR-MCNC: 196 MG/DL (ref 70–99)
GLUCOSE BLDC GLUCOMTR-MCNC: 204 MG/DL (ref 70–99)
GLUCOSE BLDC GLUCOMTR-MCNC: 230 MG/DL (ref 70–99)
GLUCOSE BLDC GLUCOMTR-MCNC: 243 MG/DL (ref 70–99)
GLUCOSE SERPL-MCNC: 230 MG/DL (ref 70–99)
HCT VFR BLD AUTO: 24.5 % (ref 35–47)
HGB BLD-MCNC: 7.9 G/DL (ref 11.7–15.7)
IMM GRANULOCYTES # BLD: 0 10E9/L (ref 0–0.4)
IMM GRANULOCYTES NFR BLD: 0.8 %
INTERPRETATION ECG - MUSE: NORMAL
LDH SERPL L TO P-CCNC: 330 U/L (ref 81–234)
LYMPHOCYTES # BLD AUTO: 0.4 10E9/L (ref 0.8–5.3)
LYMPHOCYTES NFR BLD AUTO: 15.1 %
MAGNESIUM SERPL-MCNC: 1.9 MG/DL (ref 1.6–2.3)
MAGNESIUM SERPL-MCNC: 2.4 MG/DL (ref 1.6–2.3)
MCH RBC QN AUTO: 30.2 PG (ref 26.5–33)
MCHC RBC AUTO-ENTMCNC: 32.2 G/DL (ref 31.5–36.5)
MCV RBC AUTO: 94 FL (ref 78–100)
MONOCYTES # BLD AUTO: 0 10E9/L (ref 0–1.3)
MONOCYTES NFR BLD AUTO: 1.3 %
NEUTROPHILS # BLD AUTO: 2 10E9/L (ref 1.6–8.3)
NEUTROPHILS NFR BLD AUTO: 82.8 %
NRBC # BLD AUTO: 0 10*3/UL
NRBC BLD AUTO-RTO: 1 /100
NUM BPU REQUESTED: 1
PHOSPHATE SERPL-MCNC: 2.3 MG/DL (ref 2.5–4.5)
PLATELET # BLD AUTO: 18 10E9/L (ref 150–450)
POTASSIUM SERPL-SCNC: 3.1 MMOL/L (ref 3.4–5.3)
POTASSIUM SERPL-SCNC: 3.4 MMOL/L (ref 3.4–5.3)
PROT SERPL-MCNC: 5.8 G/DL (ref 6.8–8.8)
RBC # BLD AUTO: 2.62 10E12/L (ref 3.8–5.2)
SODIUM SERPL-SCNC: 140 MMOL/L (ref 133–144)
SPECIMEN EXP DATE BLD: NORMAL
TRANSFUSION STATUS PATIENT QL: NORMAL
TRANSFUSION STATUS PATIENT QL: NORMAL
WBC # BLD AUTO: 2.4 10E9/L (ref 4–11)

## 2017-08-01 PROCEDURE — 25000125 ZZHC RX 250: Performed by: INTERNAL MEDICINE

## 2017-08-01 PROCEDURE — 83735 ASSAY OF MAGNESIUM: CPT | Performed by: PHYSICIAN ASSISTANT

## 2017-08-01 PROCEDURE — 25000132 ZZH RX MED GY IP 250 OP 250 PS 637: Performed by: INTERNAL MEDICINE

## 2017-08-01 PROCEDURE — 84100 ASSAY OF PHOSPHORUS: CPT | Performed by: PHYSICIAN ASSISTANT

## 2017-08-01 PROCEDURE — 25000128 H RX IP 250 OP 636: Performed by: INTERNAL MEDICINE

## 2017-08-01 PROCEDURE — 25000132 ZZH RX MED GY IP 250 OP 250 PS 637

## 2017-08-01 PROCEDURE — 25000125 ZZHC RX 250: Performed by: PHYSICIAN ASSISTANT

## 2017-08-01 PROCEDURE — 80048 BASIC METABOLIC PNL TOTAL CA: CPT | Performed by: PHYSICIAN ASSISTANT

## 2017-08-01 PROCEDURE — 83615 LACTATE (LD) (LDH) ENZYME: CPT | Performed by: PHYSICIAN ASSISTANT

## 2017-08-01 PROCEDURE — 25000132 ZZH RX MED GY IP 250 OP 250 PS 637: Performed by: PHYSICIAN ASSISTANT

## 2017-08-01 PROCEDURE — 25000131 ZZH RX MED GY IP 250 OP 636 PS 637: Performed by: INTERNAL MEDICINE

## 2017-08-01 PROCEDURE — 80076 HEPATIC FUNCTION PANEL: CPT | Performed by: INTERNAL MEDICINE

## 2017-08-01 PROCEDURE — 93010 ELECTROCARDIOGRAM REPORT: CPT | Performed by: INTERNAL MEDICINE

## 2017-08-01 PROCEDURE — P9016 RBC LEUKOCYTES REDUCED: HCPCS | Performed by: PHYSICAL MEDICINE & REHABILITATION

## 2017-08-01 PROCEDURE — 00000146 ZZHCL STATISTIC GLUCOSE BY METER IP

## 2017-08-01 PROCEDURE — 97110 THERAPEUTIC EXERCISES: CPT | Mod: GP

## 2017-08-01 PROCEDURE — 93005 ELECTROCARDIOGRAM TRACING: CPT

## 2017-08-01 PROCEDURE — 84132 ASSAY OF SERUM POTASSIUM: CPT | Performed by: INTERNAL MEDICINE

## 2017-08-01 PROCEDURE — 20000002 ZZH R&B BMT INTERMEDIATE

## 2017-08-01 PROCEDURE — 25000128 H RX IP 250 OP 636: Performed by: PHYSICIAN ASSISTANT

## 2017-08-01 PROCEDURE — 25000132 ZZH RX MED GY IP 250 OP 250 PS 637: Performed by: STUDENT IN AN ORGANIZED HEALTH CARE EDUCATION/TRAINING PROGRAM

## 2017-08-01 PROCEDURE — 97530 THERAPEUTIC ACTIVITIES: CPT | Mod: GP

## 2017-08-01 PROCEDURE — 85025 COMPLETE CBC W/AUTO DIFF WBC: CPT | Performed by: PHYSICIAN ASSISTANT

## 2017-08-01 PROCEDURE — 25000132 ZZH RX MED GY IP 250 OP 250 PS 637: Performed by: NURSE PRACTITIONER

## 2017-08-01 PROCEDURE — 83735 ASSAY OF MAGNESIUM: CPT | Performed by: INTERNAL MEDICINE

## 2017-08-01 PROCEDURE — 40000193 ZZH STATISTIC PT WARD VISIT

## 2017-08-01 PROCEDURE — 97116 GAIT TRAINING THERAPY: CPT | Mod: GP

## 2017-08-01 RX ORDER — SPIRONOLACTONE 25 MG
12.5 TABLET ORAL DAILY
Status: DISCONTINUED | OUTPATIENT
Start: 2017-08-01 | End: 2017-08-04 | Stop reason: HOSPADM

## 2017-08-01 RX ORDER — POTASSIUM CHLORIDE 29.8 MG/ML
20 INJECTION INTRAVENOUS
Status: DISCONTINUED | OUTPATIENT
Start: 2017-08-01 | End: 2017-08-02

## 2017-08-01 RX ORDER — POTASSIUM CHLORIDE 7.45 MG/ML
10 INJECTION INTRAVENOUS
Status: DISCONTINUED | OUTPATIENT
Start: 2017-08-01 | End: 2017-08-02

## 2017-08-01 RX ORDER — POTASSIUM CHLORIDE 29.8 MG/ML
20 INJECTION INTRAVENOUS 3 TIMES DAILY
Status: DISCONTINUED | OUTPATIENT
Start: 2017-08-01 | End: 2017-08-02

## 2017-08-01 RX ORDER — POTASSIUM CL/LIDO/0.9 % NACL 10MEQ/0.1L
10 INTRAVENOUS SOLUTION, PIGGYBACK (ML) INTRAVENOUS
Status: DISCONTINUED | OUTPATIENT
Start: 2017-08-01 | End: 2017-08-02

## 2017-08-01 RX ADMIN — DIGOXIN 125 MCG: 125 TABLET ORAL at 08:14

## 2017-08-01 RX ADMIN — FOLIC ACID 1 MG: 1 TABLET ORAL at 08:14

## 2017-08-01 RX ADMIN — FLUCONAZOLE 200 MG: 200 TABLET ORAL at 08:13

## 2017-08-01 RX ADMIN — MULTIPLE VITAMINS W/ MINERALS TAB 1 TABLET: TAB at 08:12

## 2017-08-01 RX ADMIN — POTASSIUM CHLORIDE 20 MEQ: 29.8 INJECTION, SOLUTION INTRAVENOUS at 19:45

## 2017-08-01 RX ADMIN — POTASSIUM CHLORIDE 20 MEQ: 29.8 INJECTION, SOLUTION INTRAVENOUS at 04:52

## 2017-08-01 RX ADMIN — ALLOPURINOL 300 MG: 300 TABLET ORAL at 08:14

## 2017-08-01 RX ADMIN — THIAMINE HCL (VITAMIN B1) 50 MG TABLET 50 MG: at 08:14

## 2017-08-01 RX ADMIN — ACYCLOVIR 400 MG: 400 TABLET ORAL at 08:13

## 2017-08-01 RX ADMIN — MIDODRINE HYDROCHLORIDE 10 MG: 5 TABLET ORAL at 12:19

## 2017-08-01 RX ADMIN — Medication 2 G: at 02:26

## 2017-08-01 RX ADMIN — PROMETHAZINE HYDROCHLORIDE 25 MG: 25 TABLET ORAL at 03:43

## 2017-08-01 RX ADMIN — CALCIUM CARBONATE 600 MG (1,500 MG)-VITAMIN D3 400 UNIT TABLET 2 TABLET: at 08:46

## 2017-08-01 RX ADMIN — PROMETHAZINE HYDROCHLORIDE 25 MG: 25 TABLET ORAL at 12:19

## 2017-08-01 RX ADMIN — PANTOPRAZOLE SODIUM 40 MG: 40 TABLET, DELAYED RELEASE ORAL at 06:14

## 2017-08-01 RX ADMIN — PROMETHAZINE HYDROCHLORIDE 25 MG: 25 TABLET ORAL at 19:45

## 2017-08-01 RX ADMIN — ATENOLOL 25 MG: 25 TABLET ORAL at 08:13

## 2017-08-01 RX ADMIN — Medication 12.5 MG: at 17:02

## 2017-08-01 RX ADMIN — MICONAZOLE NITRATE: 2 POWDER TOPICAL at 08:18

## 2017-08-01 RX ADMIN — POTASSIUM CHLORIDE 20 MEQ: 29.8 INJECTION, SOLUTION INTRAVENOUS at 14:53

## 2017-08-01 RX ADMIN — GABAPENTIN 200 MG: 100 CAPSULE ORAL at 21:01

## 2017-08-01 RX ADMIN — FUROSEMIDE 5 MG/HR: 10 INJECTION, SOLUTION INTRAVENOUS at 22:40

## 2017-08-01 RX ADMIN — GABAPENTIN 200 MG: 100 CAPSULE ORAL at 14:52

## 2017-08-01 RX ADMIN — ACYCLOVIR 400 MG: 400 TABLET ORAL at 19:45

## 2017-08-01 RX ADMIN — MIDODRINE HYDROCHLORIDE 10 MG: 5 TABLET ORAL at 08:12

## 2017-08-01 RX ADMIN — FUROSEMIDE 5 MG/HR: 10 INJECTION, SOLUTION INTRAVENOUS at 02:06

## 2017-08-01 RX ADMIN — CYCLOPHOSPHAMIDE 1240 MG: 2 INJECTION, POWDER, FOR SOLUTION INTRAVENOUS; ORAL at 18:02

## 2017-08-01 RX ADMIN — Medication: at 12:19

## 2017-08-01 RX ADMIN — PREDNISONE 50 MG: 50 TABLET ORAL at 16:38

## 2017-08-01 RX ADMIN — SODIUM CHLORIDE 150 MG: 9 INJECTION, SOLUTION INTRAVENOUS at 17:02

## 2017-08-01 RX ADMIN — MIDODRINE HYDROCHLORIDE 10 MG: 5 TABLET ORAL at 18:19

## 2017-08-01 RX ADMIN — PREDNISONE 50 MG: 50 TABLET ORAL at 08:14

## 2017-08-01 RX ADMIN — POTASSIUM CHLORIDE 20 MEQ: 29.8 INJECTION, SOLUTION INTRAVENOUS at 08:10

## 2017-08-01 RX ADMIN — GABAPENTIN 200 MG: 100 CAPSULE ORAL at 08:13

## 2017-08-01 RX ADMIN — SODIUM PHOSPHATE, MONOBASIC, MONOHYDRATE AND SODIUM PHOSPHATE, DIBASIC, ANHYDROUS 15 MMOL: 276; 142 INJECTION, SOLUTION INTRAVENOUS at 08:09

## 2017-08-01 RX ADMIN — POTASSIUM CHLORIDE 20 MEQ: 29.8 INJECTION, SOLUTION INTRAVENOUS at 13:02

## 2017-08-01 RX ADMIN — SENNOSIDES 1 TABLET: 8.6 TABLET, FILM COATED ORAL at 22:42

## 2017-08-01 RX ADMIN — POTASSIUM CHLORIDE 20 MEQ: 29.8 INJECTION, SOLUTION INTRAVENOUS at 03:43

## 2017-08-01 NOTE — PLAN OF CARE
Problem: Goal Outcome Summary  Goal: Goal Outcome Summary  1. Pt will have fevers well controlled  2. Pt will be hemodynamically stable   Outcome: Declining  Afebrile. Continues on tele. Still in afib, rate of 45-73 with occasional PVCs. Hypertensive, /93. Fluid still up. 2+ LE edema. Wearing compression stockings. Continues on lasix gtt. Aldactone added. Sodium phos and potassium replaced per electrolyte SS. Huynh intact. Good urine output. Tolerated Vincristine and etoposide well. Cytoxan currently running. Emend given pre-cytoxan. C/o of mild nausea, relieved with scheduled phenergan. Denies V/D. -243, covered with SSI. Eating and drinking well. Up in chair. Worked with PT. More lethargic today. Sleeping between cares.  supportive at bedside. Pt offered no further concerns.

## 2017-08-01 NOTE — PLAN OF CARE
Problem: Goal Outcome Summary  Goal: Goal Outcome Summary  1. Pt will have fevers well controlled  2. Pt will be hemodynamically stable   PT 5C: Pt states she feels sluggish today but is agreeable to PT. Pt ambulates 2 x 125' with FWW and SBA - CGA + assist for line management. Completes sit <> stand from recliner with Min A, from bed height with SBA. Requires Min A to return to bed at end of session (LE support). Pt is agreeable to standing therapeutic exercises to increase LE strength, fatigues quickly and requires multiple rest breaks on this day. O2 sats 97% or greater on 2LPM O2 throughout session, HR ranges in 70s-80s throughout session.      Recommend ARU once medically appropriate to improve strength, functional mobility, and activity tolerance for increased safety with independence.

## 2017-08-01 NOTE — PROGRESS NOTES
Cardiology Progress Note  August 1, 2017    This patient was discussed with Dr. Mclain and the note below reflects our joint plan.    Interval History: 16 beat run of non-sustained polymorphic VT overnight, appears to be R on T phenomenon. Patient was asymptomatic at the time. ECG obtained, QTc is 510-520 ms using Fredericia QTc calculator.  Assessment and Recommendation:   Amelia Michel is a 56 year old female with a complex past medical history significant for prior VSD repair, rheumatoid arthritis on long-term methotrexate, recent dx atrial fibrillation/flutter/SVT and recent out of hospital cardiac arrest on 5/29 thought to be related to AV prieto blocking agents who was discharged to TCU on 6/15 with lifevest. Mrs. Michel was readmitted on 6/19 with fever of unknown origin and a-fib with RVR. Extensive infectious work-up negative, bone-marrow biopsy confirmed diagnosis of DLBCL. She was transferred to the Hematology service on 7/26 for initiation of R-EPOCH chemotherapy. Cardiology continuing to follow for assistance with volume overload and arrhythmia management.     Volume status: Remains hypervolemic, renal function stable.  -- Continue lasix 5 mg/hour, keep slightly net even to slightly net negative (-500 cc/24 hours)  -- Labs show hypokalemia requiring frequent potassium supplementation  -- Consider spironolactone 12.5 mg daily in addition to lasix      Arrhythmias: Currently in a-fib with rates in the 60's, on atenolol and digoxin for rate control. Overnight had a 16 beat run of non-sustained polymorphic VT, we reviewed telemetry and it appears to be R on T phenomenon. EKG was obtained, QTc is 510-520 ms using Fredericia QTc calculator.   -- Avoid QT prolonging agents, consider pharmacy consult to review medications  -- Keep Mag > 2 and K > 4  -- Daily ECG's  -- Continue to monitor on telemetry  -- Per EP notes, patient meets criteria for secondary prevention ICD, this can be scheduled once left upper  "extremity wound heals  -- Will continue to follow    Thank you for consulting the cardiovascular services at the [facility]. Please do not hesitate to call us with any questions.     PAVAN Reyna  [facility] Cardiology Consult Team  Pager [phone] or [phone]        Review of systems: 4 point ROS including Respiratory, CV, GI and , other than that noted in the HPI,  is negative     Objective:   Vitals: /89 (BP Location: Left leg)  Pulse 60  Temp 96.8  F (36  C) (Axillary)  Resp 16  Ht 1.455 m (4' 9.28\")  Wt 67.4 kg (148 lb 9.6 oz)  SpO2 93%  BMI 31.84 kg/m2     Intake/Output Summary (Last 24 hours) at 08/01/17 1506  Last data filed at 08/01/17 1400   Gross per 24 hour   Intake           4061.2 ml   Output             4500 ml   Net           -438.8 ml     Gen: AxO, NAD   Lungs: normal respirations  CV: S1S2,Reg, no M/R/G noted. No JVD.   Abd: Soft   Ext:1-2+ edema in hips and thighs  Labs:  Lab Results   Component Value Date    TROPI 0.024 06/24/2017    TROPI 0.381 () 05/30/2017    TROPI 0.457 () 05/30/2017    TROPI 0.569 () 05/30/2017    TROPI 0.912 () 05/29/2017    TROPONIN 0.19 () 05/29/2017     Diagnostic studies:     Echo 7/10/17:     Interpretation Summary  Technically difficult study. Limited study.  Borderline (EF 50-55%) reduced left ventricular function is present.  Global right ventricular function is mildly reduced.  Moderate tricuspid insufficiency is present. This is unchanged from prior, and  was determined to be from prolapse on prior BRE.  No vegetations seen on this challenging study. Consider BRE if suspicion for  endocarditis is high.  Meds per EPIC EMR:    potassium chloride  20 mEq Intravenous TID     promethazine  25 mg Oral Q8H     insulin aspart  1-10 Units Subcutaneous TID AC     insulin aspart  1-7 Units Subcutaneous At Bedtime     atenolol  25 mg Oral Daily     melatonin  1-3 mg Oral At Bedtime     etoposide (TOPOSAR) chemo " infusion  25 mg/m2 (Treatment Plan Recorded) Intravenous Q24H     allopurinol  300 mg Oral Daily     acyclovir  400 mg Oral BID     fluconazole  200 mg Oral Daily     fosaprepitant (EMEND) 150 mg intermittent infusion  150 mg Intravenous Once     [START ON 8/2/2017] dexamethasone  8 mg Oral Daily     predniSONE  30 mg/m2 (Treatment Plan Recorded) Oral BID     [START ON 8/2/2017] filgrastim (NEUPOGEN/GRANIX) intravenous  5 mcg/kg (Treatment Plan Recorded) Intravenous Daily at 8 pm     vinCRIStine/DOXOrubicin (ONCOVIN/ADRIAMYCIN) chemo infusion  0.4 mg/m2 (Treatment Plan Recorded) Intravenous Q24H     cyclophosphamide (CYTOXAN) chemo infusion  750 mg/m2 (Treatment Plan Recorded) Intravenous Once     sodium chloride (PF)  10 mL Intracatheter Q7 Days     [START ON 8/4/2017] predniSONE  5 mg Oral Daily     morphine 0.1% in solosite  2 g Transdermal Daily     sodium chloride (PF)  1-74 mL Intracatheter Q8H     vitamin  B-1  50 mg Oral Daily     folic acid  1 mg Oral Daily     midodrine  10 mg Oral TID w/meals     miconazole   Topical BID     pantoprazole  40 mg Oral QAM     digoxin  125 mcg Oral Daily     sodium chloride (PF)  10 mL Intracatheter Q8H     gabapentin  200 mg Oral TID     multivitamin, therapeutic with minerals  1 tablet Oral Daily     calcium-vitamin D  2 tablet Oral Daily         Patient was seen and evaluated with the team.Agree with consult note.

## 2017-08-01 NOTE — PROGRESS NOTES
[facility], [place]    Hematology / Oncology Progress Note    Date of Service (when I saw the patient): 08/01/2017     Assessment & Plan   Amelia Michel is a 56 year old female who was admitted on 6/19/2017. She has complex past medical history of prior VSD repair, rheumatoid arthritis on long-term methotrexate, recent dx atrial fibrillation/flutter/SVT who was recently admitted to [facility] on 5/29 after an out of hospital cardiac arrest, admission complicated by intubation with discharge to TCU. Was discharged to TCU and subsequently readmitted to Cardiology service for FUO. With workup of fever and progressive cytopenias, diagnosis of DLBCL was made. She was transferred to the Hematology service on 7/26. See discussions of diagnosis and reasoning for treatment plan in 7/27 Heme/Onc note. Started Cycle 1 R-EPOCH on 7/28.     HEME:    #DLBCL. Stage 4 with bone marrow and liver involvement. IPI 4. Possibly related to prior methotrexate.   - PET/CT 7/18 revealing for mediastinal and neck level 2A lymphadenopathy, extensive FDG-avid bony lesions, hepatosplenic FDG-avid lesions, pelvic lesions contingous with the uterus, and bilateral pleural effusions.    - Bone marrow biopsy 7/12: scattered plasma cells in perivascular clusters, with no definitive staining of B-cell population by CD5; on re-review, possibly consistent with intravascularge large B-cell lymphoma. However, liver biopsy (7/21) consistent with DLBCL, negative for BCL2/BCL6/MYC rearrangements, CD20 positive.   - PICC line placed    Treatment Plan: DA-EPOCH-R Cycle 1 (Day 1=7/28/17). Today is Day 5.   - reaction to rituxan (hypotension with BP 87/45, SOB/feeling of ?throat swelling/difficulty getting breath in; then fever/rigors). No stridor. No rashes or hives. Received rescue methylpred and demerol). -- completed  - Etoposide CIVI on Days 1-4 -- completed  - Vincristine/Doxorubicin CIVI on Days 1-4 -- completed  - Prednisone  50mg BID x 5 days  - Cytoxan on Day 5  - will need Neupogen support starting D6    #Hyperuricemia  #Monitor for TLS.  Uric acid up to 8.2 (7/27 PM), s/p Rasburicase. Uric acid improved to 4.2. Continue Allopurinol. Daily labs given recent stability. Back off on frequency of Mg/Phos/uric acid when appropriate after chemotherapy.     #DIC. INR prolonged, now improving. Fibrinogen currently ok.   - conditional transfusion parameters in place: 2U plasma for INR >1.8, 5U cryo for fibrinogen <150.   - started Vitamin K 10mg PO x 3 days (7/28-7/30)  - adjust coag monitoring to qMWF x 4 occurrences; adjust if needed    #Pancytopenia, secondary to DLBCL  - transfuse to maintain Hgb >8.0 (keep given recent cardiac history), platelets >10,000    ID:   #PPX  - continue ppx ACV, Levaquin, Fluconazole     #Persistent Fevers.  #Lactic acidosis.   Previously followed by ID, who signed off 7/21.  Extensive infectious evaluation including blood cultures, which remain NGTD. Additionally, stool virus panel, M. Tuberculsosis, tick-borne illnesses, and bartonella/Q-fever have all been negative. Fevers/lactate secondary to malignancy.   - possible pneumonia while recently on meropenem (7/1-7/5);  CT chest (7/13) did reveal a right lung tree-in-bud opacities with more consolidative opacities in RLL; but no accompanying focal symptoms. Appears to have received dose of Meropenem on 7/14, now off.   - lactic acid increased to 10.4 (7/28) during Rituxan infusion reaction.   - cultures have been NGTD, avoid additional fluids given hypervolemia, monitor closely    CV/PV:    #Atrial fibrillation/flutter   #Recent out of hospital cardiac arrest (5/29/17)  #Prior VSD repair (1969)  - PET scan suggested FDG-avid lesions leading to thickening of the interatrial septum, extending superiorly along the main pulmonary artery; although cardiac MRI 7/24 shows no evidence of infiltrative disease    - Appreciate Cardiology team recs  - continue Digoxin  125mcg daily. Digoxin level within range on 7/30 AM.   - continue Atenolol 25mg daily (decreased 7/30). Note, per pt/, metoprolol has not worked for her and makes her feel poorly.  PT also notes that she does NOT want amiodarone, as there is concern that this led to her cardiac arrest (and I will look further into this).   - continuous telemetry    #Bradycardia. HRs 39-50s (7/28-7/29). On tele strips and formal EKG 7/29 this appeared to be sinus shelton. D/w Cards team who agree with decreasing atenolol dose. Checked dig level as above. Cardiology team continues to follow.      #Aortic masses. BRE on 7/14 showed 3 small masses on the coronary cusps and LVOT. Do not believe this to be endocarditis; no antibiotics at this time. Unclear what this represents; possibly could be Libmann-Sacks.      #Anasarca, volume overload  - Torsemide 40 mg BID with additional PRN. Per Cards (7/28): changed diuretic to Lasix gtt at 5mg/hr given fluids with CIVI chemo (which is generally around 70cc/hr). This may also help with additional diuresis with increased blood product needs.   - Midodrine to aid in renal perfusion  - 2g Na limit  - Will start spironolactone 12.5mg per Cards recs    GI:   #Nausea, mild.   - will now have scheduled antiemetic (Zofran, Emend)) with chemo, PRN Compazine/Ativan    #LFT derangement. 2/2 lymphoma involvement. ALT/AST significantly improved from prior. Alk phos more elevated today, but will monitor for fluctuations. Tbili has been elevated (chemo adjusted), now downtrending.   - monitor daily through chemo, then adjust frequency as indicated    #Constipation vs. diarrhea. Stools harder on 7/28. With scheduled laxatives, pt developed looser stools. Backed off on scheduled bowel regimen. Stools now soft.     NEURO:  #Saddle anesthesia, urinary incontinence. Pt reports this is baseline for her. Unclear etiology. Was initially seen in ED and evaluated by NSG in 03/2016 for this without clear  explanation for symptoms. She has been noted to have an S1 radiculopathy. Recently seen by Neuro in 06/2017, placed consult today.    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 HOLDING PTA Prednisone with plan for higher dose steroids on treatment plan. Resume 5mg dose as of 8/4.   - Follow-up with [facility] Dr. Conor Cunha in 4-6 weeks after discharge    ENDO:  #Steroid induced hyperglycemia. Added QID BG check and med SSI.     DERM:   #Extravasation-related LUE wound. Slowly improving.   - WOCN following, appreciate recs    GEN:  #Deconditioning. 2/2 prolonged hospital stays, acute illness/malignancy  - PT/OT. Recent rec for ARU, so PM&R consult placed.     FEN  - 2g Na diet  - Dietician following    #Hypokalemia. 2/2 lasix. Started PO supplement in addition to sliding scale (currently high given cardiac issues/acute illness). Will transition to schedule IV replacement and sliding scale replacement, only IV.    PPx:   - VTE: defer VTE pharmacologic ppx due to thrombocytopenia  - GI: continue protonix    CODE: Full Code    Disposition: Inpatient hospitalization until completion of cycle 1 of chemotherapy and when medically ready. Will d/c to ARU.      Shyann Marinelli PA-C  Hematology/Oncology  Pager: [phone]    Interval History   Reported 18 beats of Vtach recorded on tele, HR stayed in 60s. Patient was sleeping during episode and has otherwise been symptomatic from cardiac stand point. After receiving IV phenergan yesterday she started to feel very restless, like her skin was crawling. Given some benadryl, but symptoms did not resolve for ~6 hours afterwards. This morning she does not report ongoing issues. Appetite is ok,  best with breakfast and tapers off for the rest of the day. Mouth sore is stable, no new sores.     Physical Exam   Temp: 96.8  F (36  C) Temp src: Axillary BP: 159/89   Heart Rate: 73 Resp: 16 SpO2: 93 % O2 Device: None (Room air)    Vitals:    07/30/17 0751 07/31/17 0854 08/01/17 0741   Weight: 67 kg (147 lb 11.3 oz) 66.9 kg (147 lb 6.4 oz) 67.4 kg (148 lb 9.6 oz)     Vital Signs with Ranges  Temp:  [96.5  F (35.8  C)-97.7  F (36.5  C)] 96.8  F (36  C)  Heart Rate:  [62-78] 73  Resp:  [10-20] 16  BP: (140-169)/(60-93) 159/89  SpO2:  [92 %-98 %] 93 %  I/O last 3 completed shifts:  In: 4135.4 [P.O.:1540; I.V.:685; IV Piggyback:1610.4]  Out: 5550 [Urine:5550]  Constitutional: Pleasant and cooperative female seen lying in bed. Awake, alert, NAD.  HEENT: NC/AT, EOMI, sclera clear, conjunctiva normal, OP with MMM, small white ulcerated lesion noted on lower gumline  Respiratory: No increased work of breathing, CTAB, no crackles or wheezing.  Cardiovascular: Irregular rhythm, rate controlled, no murmur noted. No peripheral edema.  GI: Normal bowel sounds, soft, non-distended and non-tender.  Skin: Warm, dry, well-perfused. No bruising, bleeding, rashes, or lesions on limited exam.  Neurologic: A&O. Answers questions appropriately. Moves all extremities spontaneously.  Neuropsychiatric: Calm, appropriate affect    Medications     furosemide (LASIX) infusion ADULT 5 mg/hr (08/01/17 0206)     - MEDICATION INSTRUCTIONS -       NaCl       IV fluid REPLACEMENT ONLY       - MEDICATION INSTRUCTIONS -         potassium chloride  20 mEq Intravenous TID     promethazine  25 mg Oral Q8H     insulin aspart  1-10 Units Subcutaneous TID AC     insulin aspart  1-7 Units Subcutaneous At Bedtime     atenolol  25 mg Oral Daily     melatonin  1-3 mg Oral At Bedtime     etoposide (TOPOSAR) chemo infusion  25 mg/m2 (Treatment Plan Recorded) Intravenous Q24H     allopurinol  300 mg Oral Daily     acyclovir  400 mg Oral BID     fluconazole  200  mg Oral Daily     fosaprepitant (EMEND) 150 mg intermittent infusion  150 mg Intravenous Once     [START ON 8/2/2017] dexamethasone  8 mg Oral Daily     predniSONE  30 mg/m2 (Treatment Plan Recorded) Oral BID     [START ON 8/2/2017] filgrastim (NEUPOGEN/GRANIX) intravenous  5 mcg/kg (Treatment Plan Recorded) Intravenous Daily at 8 pm     vinCRIStine/DOXOrubicin (ONCOVIN/ADRIAMYCIN) chemo infusion  0.4 mg/m2 (Treatment Plan Recorded) Intravenous Q24H     cyclophosphamide (CYTOXAN) chemo infusion  750 mg/m2 (Treatment Plan Recorded) Intravenous Once     sodium chloride (PF)  10 mL Intracatheter Q7 Days     [START ON 8/4/2017] predniSONE  5 mg Oral Daily     morphine 0.1% in solosite  2 g Transdermal Daily     sodium chloride (PF)  1-74 mL Intracatheter Q8H     vitamin  B-1  50 mg Oral Daily     folic acid  1 mg Oral Daily     midodrine  10 mg Oral TID w/meals     miconazole   Topical BID     pantoprazole  40 mg Oral QAM     digoxin  125 mcg Oral Daily     sodium chloride (PF)  10 mL Intracatheter Q8H     gabapentin  200 mg Oral TID     multivitamin, therapeutic with minerals  1 tablet Oral Daily     calcium-vitamin D  2 tablet Oral Daily       Data   Results for orders placed or performed during the hospital encounter of 06/19/17 (from the past 24 hour(s))   Glucose by meter   Result Value Ref Range    Glucose 288 (H) 70 - 99 mg/dL   Potassium   Result Value Ref Range    Potassium 3.5 3.4 - 5.3 mmol/L   Glucose by meter   Result Value Ref Range    Glucose 307 (H) 70 - 99 mg/dL   Basic metabolic panel   Result Value Ref Range    Sodium 140 133 - 144 mmol/L    Potassium 3.1 (L) 3.4 - 5.3 mmol/L    Chloride 98 94 - 109 mmol/L    Carbon Dioxide 32 20 - 32 mmol/L    Anion Gap 10 3 - 14 mmol/L    Glucose 230 (H) 70 - 99 mg/dL    Urea Nitrogen 30 7 - 30 mg/dL    Creatinine 0.54 0.52 - 1.04 mg/dL    GFR Estimate >90  Non  GFR Calc   >60 mL/min/1.7m2    GFR Estimate If Black >90   GFR Calc   >60  mL/min/1.7m2    Calcium 7.5 (L) 8.5 - 10.1 mg/dL   CBC with platelets differential   Result Value Ref Range    WBC 2.4 (L) 4.0 - 11.0 10e9/L    RBC Count 2.62 (L) 3.8 - 5.2 10e12/L    Hemoglobin 7.9 (L) 11.7 - 15.7 g/dL    Hematocrit 24.5 (L) 35.0 - 47.0 %    MCV 94 78 - 100 fl    MCH 30.2 26.5 - 33.0 pg    MCHC 32.2 31.5 - 36.5 g/dL    RDW 26.6 (H) 10.0 - 15.0 %    Platelet Count 18 (LL) 150 - 450 10e9/L    Diff Method Automated Method     % Neutrophils 82.8 %    % Lymphocytes 15.1 %    % Monocytes 1.3 %    % Eosinophils 0.0 %    % Basophils 0.0 %    % Immature Granulocytes 0.8 %    Nucleated RBCs 1 (H) 0 /100    Absolute Neutrophil 2.0 1.6 - 8.3 10e9/L    Absolute Lymphocytes 0.4 (L) 0.8 - 5.3 10e9/L    Absolute Monocytes 0.0 0.0 - 1.3 10e9/L    Absolute Eosinophils 0.0 0.0 - 0.7 10e9/L    Absolute Basophils 0.0 0.0 - 0.2 10e9/L    Abs Immature Granulocytes 0.0 0 - 0.4 10e9/L    Absolute Nucleated RBC 0.0    Lactate Dehydrogenase   Result Value Ref Range    Lactate Dehydrogenase 330 (H) 81 - 234 U/L   Magnesium   Result Value Ref Range    Magnesium 1.9 1.6 - 2.3 mg/dL   Phosphorus   Result Value Ref Range    Phosphorus 2.3 (L) 2.5 - 4.5 mg/dL   Potassium   Result Value Ref Range    Potassium 3.4 3.4 - 5.3 mmol/L   Magnesium   Result Value Ref Range    Magnesium 2.4 (H) 1.6 - 2.3 mg/dL   Hepatic panel   Result Value Ref Range    Bilirubin Direct 0.8 (H) 0.0 - 0.2 mg/dL    Bilirubin Total 1.4 (H) 0.2 - 1.3 mg/dL    Albumin 2.9 (L) 3.4 - 5.0 g/dL    Protein Total 5.8 (L) 6.8 - 8.8 g/dL    Alkaline Phosphatase 292 (H) 40 - 150 U/L    ALT 77 (H) 0 - 50 U/L    AST 93 (H) 0 - 45 U/L   Glucose by meter   Result Value Ref Range    Glucose 196 (H) 70 - 99 mg/dL   Glucose by meter   Result Value Ref Range    Glucose 243 (H) 70 - 99 mg/dL   EKG 12-lead, tracing only   Result Value Ref Range    Interpretation ECG Click View Image link to view waveform and result

## 2017-08-01 NOTE — CONSULTS
"Neurology Consultation     Amelia Michel     56 year old         Reason for consult: I was asked by Dr Hale to evaluate this patient for chronic urinary incontinence, saddle anaesthesia, and paraesthesias in RLE.              HPI:    Bree is a 57 yo woman with a hx of B cell lymphoma stage 4,  VSD repair, RA, radiculopathy, Afib/Aflutter/SVT, hx of cardiac arrest outside of hospital on 5/29  and severe critical illness myopathy  Admitted for chimiotearapy.     She is known by neurology since 2016 with complain of  \"saddel anaesthesia\", RLE tingling, and chronic urinary incontinence. Except for her urinary incontinence, she states that these symptoms are largely unchanged.  She has been extensively investigate for this complain in the [facility] in 2016 and in a consult one month ago. She was also investigated by an outside neurologist.   Per the pt's , the pt recently rolled her ankle and was unaware/ did not feel it until it was quite swollen.     The pt states her urinary incontinence is chronic over the last year may be worse, but she is unsure because of having brand catheter. She describes it as \"dribbling\" when she is not catheterized. She is continent to bowel.     When asked what is her biggest complain, she refers the urinary incontinence that something that she would like to be resolved or better managed.     She denies any new numbness, weakness, confusion, alteration in her vision or in her speech.                  Past Medical History:       Past Medical History         Past Medical History:   Diagnosis Date     Hypertension       Rheumatoid arthritis(714.0)                    Past Surgical History:       Past Surgical History          Past Surgical History:   Procedure Laterality Date     ANESTHESIA CARDIOVERSION N/A 5/17/2017     Procedure: ANESTHESIA CARDIOVERSION;  ANESTHESIA CARDIOVERSION;  Surgeon: GENERIC ANESTHESIA PROVIDER;  Location: [facility] OR     ARTHRODESIS WRIST   2000     Right " "wrist     BONE MARROW ASPIRATE &BIOPSY   7/12/2017            FOOT SURGERY         4 left and 2 right     RELEASE CARPAL TUNNEL BILATERAL         REPAIR VENTRICULAR SEPTAL DEFECT   1969                  Social History:              Social History    Substance Use Topics      Smoking status: Never Smoker      Smokeless tobacco: Never Used      Alcohol use Yes         Comment: Glass of wine two evenings a night                Family History:       Family History           Family History   Problem Relation Age of Onset     Breast Cancer Mother       Hypertension Mother       Alzheimer Disease Mother       Hypertension Father       Blood Disease Father         Lymphoa     Circulatory Father         A Fib     DIABETES Paternal Grandmother                    Allergies:            Allergies   Allergen Reactions     Metoprolol Other (See Comments)       Pt and  report that metoprolol does not work for her and also reports feeling unwell with this medication. She has been able to tolerate atenolol, which as worked in controlling her HR.      No Clinical Screening - See Comments         Penicillin Allergy Skin Test not performed, see antimicrobial management team progress note 7/5/17.     Penicillins       Tape [Adhesive Tape] Rash               Medications:   I have reviewed this patient's current medications           Review of Systems:   The Review of Systems is negative other than noted in the HPI except for dry mouth           Physical Exam:   Vital signs:  Temp: 96.8  F (36  C) Temp src: Axillary BP: 159/89   Heart Rate: 73 Resp: 16 SpO2: 93 % O2 Device: None (Room air) Oxygen Delivery: 2 LPM Height: 145.5 cm (4' 9.28\") Weight: 67.4 kg (148 lb 9.6 oz)  Estimated body mass index is 31.84 kg/(m^2) as calculated from the following:    Height as of this encounter: 1.455 m (4' 9.28\").    Weight as of this encounter: 67.4 kg (148 lb 9.6 oz).        Constitutional : well-built  Head: atraumatic, anicteric. See " neuroexam  Eyes: see neuroexam  Extremities: compression stockings in place, minimal peripheral edema BL, multiple scars on right foot from pervious surgeries, and amputated great left toe  Psychiatry: in good mood. Collaborative     Neuroexam  Alert and oriented to place, date, self and reasons of hospitalization.  Follow commands  Face symmetric  Eyes: motility preserved, no nystagmus, pupils reactive and equal  Tongue centered  No tremors  No dysmetria (arms and legs tested)  No aphasia  mild dysarthria.      Strength:preserved on arms.   R leg: hip flexion 4+, Hip extension 4+, leg extension 5, foot movements 5  L Leg: hip flexion 4, hip extension 5, leg extension 5, dorsoflexion 4+, eversion 4, inversion 5, plantar flexion 5.   Abduction and adduction of the legs 4.   Reflexes: slightly increased on the right side with valdes on the right side.    Sensory: preserved  On arms and legs for pinprick. On genital area she has decreased sensation of the labia majora.                 Data:            Lab Results   Component Value Date     WBC 2.4 (L) 08/01/2017     HGB 7.9 (L) 08/01/2017     HCT 24.5 (L) 08/01/2017     PLT 18 (LL) 08/01/2017      08/01/2017     POTASSIUM 3.4 08/01/2017     CHLORIDE 98 08/01/2017     CO2 32 08/01/2017     BUN 30 08/01/2017     CR 0.54 08/01/2017      (H) 08/01/2017     SED 63 (H) 06/19/2017     DD 1.3 (H) 07/08/2017     NTBNPI 2348 (H) 06/26/2017     TROPONIN 0.19 (HH) 05/29/2017     TROPI 0.024 06/24/2017     AST 93 (H) 08/01/2017     ALT 77 (H) 08/01/2017     ALKPHOS 292 (H) 08/01/2017     BILITOTAL 1.4 (H) 08/01/2017     INR 1.36 (H) 07/31/2017      PET scan 7/18/2017:     ABDOMEN AND PELVIS:  There is hepatomegaly with diffuse involvement with FDG avid lesions  (max SUV is measured as 15.5 from the left lobe lesion).      There is a 2.9 x 2.9 cm hyperdense, partially FDG avid (max SUV 14.9)  pelvic lesion centered within the right posterior aspect of the  bladder and  appears to be contiguous with the uterus. Another less FDG  avid focus inferior to this lesion centered within the uterine fundus  with Max SUV 5.34.     MRI lumbar with contrast:    L5-S1: Moderate bilateral facet arthropathy. There is a small synovial  cyst along the anterior aspect of the right facet joint in the  extraforaminal zone that contacts, but does not clearly impinge upon  the exiting right L5 nerve root. No significant spinal canal or neural  foraminal narrowing.      MRI thoracic: . Worsened midline and bilateral paramedian disc  protrusion/sequestration posterior to T12, right greater than left.  2. No abnormal enhancing lesions or other findings to suggest  infection. Relative hypointensity of CSF on T2-weighted imaging is  favored to be artifactual.  3. Redemonstration of anterior compression of the spinal cord at T3-4,  which again may be due to a arachnoid cyst.    MRI cervical:  Impression:   1. No abnormal enhancement in the spinal cord, thecal sac or cervical  vertebrae.  2. 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 Stable  resultant grade 1 anterolisthesis of C4 on C5, grade 2 anterolisthesis  of C5 and C6, and grade 1 anterolisthesis of C7 on T1.  3. Stable multilevel cervical spondylosis.    MRI lumbosacral plexus:  There is some mild rightward convex curvature to the lumbar  spine. Minimal retrolisthesis is present at L1-L2, measuring  approximately 1 mm. Posterior alignment is otherwise normal. Disc  space narrowing is present at L1-L2, L2-L3, and L3-L4. There are some  focal disc protrusions or posterior osteophytes at the same levels,  but there is no significant stenosis. The lumbosacral plexus nerve  roots are visualized and are normal in appearance without any focal  masses any signal abnormalities. The bladder and pelvic structures  appear to be within normal limits. The iliopsoas muscles appear  normal. Incidental note is made of a 1 cm cyst in what is probably  the  right adnexa.    MRI brain : No abnormal foci restricted diffusion. No T2 signal abnormality in the  deep gray nuclei or cerebral cortex. Minimal leukoaraiosis. Mild  generalized parenchymal volume loss. Ventricles are not dilated out of  proportion to the cerebral sulci. Patent major intracranial vascular  flow voids. No intracranial hemorrhage, mass effect, midline shift or  abnormal extra axial fluid collection. Bilateral mastoid effusions.              Assessment and Plan:    57 yo F with stage 4 lymphoma and post cardiac arrest on QT being consulted for ' saddle anestesia' , urinary incontinence and paresthesias.     Subjective saddle anesthesia: Objective only present on labia majora. Lumbosacral image is negative.     Paresthesias on RLE: Sensation preserved on examen. On image no significant narrowing    Urinary incontinence: Unclear the pattern. She in on brand and on lasix drip. On exam no signs of upper motor syndrome.    There was a discussion with patient about the extent of investigation and her goals of care. She would benefit with a urodynamic study to know the pattern of her incontinence (detrusor hyperactivity X overflow) , she seems to be ok with this kind of investigation in a outpatient basis. She refuses [facility] / nerve studies. Another possible investigation are CSF studies to investigate CNS involvement in lymphoma. She refers that it is in the plan of the primary team in the future.     In terms of management of her urinary symptoms, a urodynamic study by urology could guide, but a trial of medications of schedule catheter can be done. This should be a discussion among the patient, primary team and palliative care.           -Neurology signs off    Thank you for this consult, please contact Neurology Consult service through pager.          Attestation:  Patient seen and discussed with Dr. Sriram Boothe  PGY4 Neurology  [id number]     PATIENT SEEN AND EXAMINED BY ME on August 1,  2017 I AGREE WITH THE FINDINGS DETAILED BY THE NEUROLOGY RESIDENT and documented in their note on August 1, 2017   PLEASE REFER TO THEIR NOTE FOR ADDITIONAL DETAILS.     DIAGNOSIS  Complicated radiculopathy with bladder dysfunction and underlying cancer  MANAGEMENT agree with input from urology with possible urodynamic studies as outpt and consideration for pharmacological management of bladder vs use of intermittent or indwelling catheter vs suprapubic. Will be getting a spinal tap at some point per patient which can help determine if there is CSF evidence of disease which may affect treatment approach. Agree with helping patient formulate goals of care and extent of desired workup - emg, neurodynamic studies, treatment, etc.     ANGELES ROWE MD

## 2017-08-01 NOTE — PLAN OF CARE
Problem: Goal Outcome Summary  Goal: Goal Outcome Summary  1. Pt will have fevers well controlled  2. Pt will be hemodynamically stable   Outcome: No Change  Pt continues to have irregular heartrate and ECG.  Overnight she had an 18 beat run of V-tach and was given 2 grams IV mag.  40 meq IV K given, 1 u RBCs started.  Pt still needs 15 mmol IV NaPhos.  Pt denies pain, nausea.

## 2017-08-02 ENCOUNTER — APPOINTMENT (OUTPATIENT)
Dept: PHYSICAL THERAPY | Facility: CLINIC | Age: 57
DRG: 871 | End: 2017-08-02
Attending: INTERNAL MEDICINE
Payer: COMMERCIAL

## 2017-08-02 ENCOUNTER — APPOINTMENT (OUTPATIENT)
Dept: OCCUPATIONAL THERAPY | Facility: CLINIC | Age: 57
DRG: 871 | End: 2017-08-02
Attending: INTERNAL MEDICINE
Payer: COMMERCIAL

## 2017-08-02 LAB
ALBUMIN SERPL-MCNC: 2.8 G/DL (ref 3.4–5)
ALP SERPL-CCNC: 261 U/L (ref 40–150)
ALT SERPL W P-5'-P-CCNC: 86 U/L (ref 0–50)
ANION GAP SERPL CALCULATED.3IONS-SCNC: 8 MMOL/L (ref 3–14)
ANION GAP SERPL CALCULATED.3IONS-SCNC: 9 MMOL/L (ref 3–14)
AST SERPL W P-5'-P-CCNC: 82 U/L (ref 0–45)
BASOPHILS # BLD AUTO: 0 10E9/L (ref 0–0.2)
BASOPHILS NFR BLD AUTO: 0 %
BILIRUB DIRECT SERPL-MCNC: 0.7 MG/DL (ref 0–0.2)
BILIRUB SERPL-MCNC: 1.3 MG/DL (ref 0.2–1.3)
BLD PROD TYP BPU: NORMAL
BLD PROD TYP BPU: NORMAL
BLD UNIT ID BPU: 0
BLOOD PRODUCT CODE: NORMAL
BPU ID: NORMAL
BUN SERPL-MCNC: 27 MG/DL (ref 7–30)
BUN SERPL-MCNC: 30 MG/DL (ref 7–30)
CALCIUM SERPL-MCNC: 7.6 MG/DL (ref 8.5–10.1)
CALCIUM SERPL-MCNC: 8.3 MG/DL (ref 8.5–10.1)
CHLORIDE SERPL-SCNC: 92 MMOL/L (ref 94–109)
CHLORIDE SERPL-SCNC: 96 MMOL/L (ref 94–109)
CO2 SERPL-SCNC: 34 MMOL/L (ref 20–32)
CO2 SERPL-SCNC: 35 MMOL/L (ref 20–32)
COPATH REPORT: NORMAL
CREAT SERPL-MCNC: 0.5 MG/DL (ref 0.52–1.04)
CREAT SERPL-MCNC: 0.6 MG/DL (ref 0.52–1.04)
DIFFERENTIAL METHOD BLD: ABNORMAL
EOSINOPHIL # BLD AUTO: 0 10E9/L (ref 0–0.7)
EOSINOPHIL NFR BLD AUTO: 0 %
ERYTHROCYTE [DISTWIDTH] IN BLOOD BY AUTOMATED COUNT: 25.6 % (ref 10–15)
FIBRINOGEN PPP-MCNC: 139 MG/DL (ref 200–420)
GFR SERPL CREATININE-BSD FRML MDRD: ABNORMAL ML/MIN/1.7M2
GFR SERPL CREATININE-BSD FRML MDRD: ABNORMAL ML/MIN/1.7M2
GLUCOSE BLDC GLUCOMTR-MCNC: 173 MG/DL (ref 70–99)
GLUCOSE BLDC GLUCOMTR-MCNC: 240 MG/DL (ref 70–99)
GLUCOSE BLDC GLUCOMTR-MCNC: 289 MG/DL (ref 70–99)
GLUCOSE BLDC GLUCOMTR-MCNC: 291 MG/DL (ref 70–99)
GLUCOSE BLDC GLUCOMTR-MCNC: 292 MG/DL (ref 70–99)
GLUCOSE SERPL-MCNC: 191 MG/DL (ref 70–99)
GLUCOSE SERPL-MCNC: 297 MG/DL (ref 70–99)
HCT VFR BLD AUTO: 27.6 % (ref 35–47)
HGB BLD-MCNC: 8.9 G/DL (ref 11.7–15.7)
IMM GRANULOCYTES # BLD: 0.1 10E9/L (ref 0–0.4)
IMM GRANULOCYTES NFR BLD: 3.7 %
INR PPP: 1.44 (ref 0.86–1.14)
INTERPRETATION ECG - MUSE: NORMAL
LDH SERPL L TO P-CCNC: 302 U/L (ref 81–234)
LYMPHOCYTES # BLD AUTO: 0.4 10E9/L (ref 0.8–5.3)
LYMPHOCYTES NFR BLD AUTO: 17.1 %
MAGNESIUM SERPL-MCNC: 2 MG/DL (ref 1.6–2.3)
MAGNESIUM SERPL-MCNC: 2.3 MG/DL (ref 1.6–2.3)
MCH RBC QN AUTO: 29.2 PG (ref 26.5–33)
MCHC RBC AUTO-ENTMCNC: 32.2 G/DL (ref 31.5–36.5)
MCV RBC AUTO: 91 FL (ref 78–100)
MONOCYTES # BLD AUTO: 0 10E9/L (ref 0–1.3)
MONOCYTES NFR BLD AUTO: 1.4 %
NEUTROPHILS # BLD AUTO: 1.7 10E9/L (ref 1.6–8.3)
NEUTROPHILS NFR BLD AUTO: 77.8 %
NRBC # BLD AUTO: 0 10*3/UL
NRBC BLD AUTO-RTO: 0 /100
NUM BPU REQUESTED: 5
PHOSPHATE SERPL-MCNC: 2.9 MG/DL (ref 2.5–4.5)
PLATELET # BLD AUTO: 19 10E9/L (ref 150–450)
POTASSIUM SERPL-SCNC: 2.9 MMOL/L (ref 3.4–5.3)
POTASSIUM SERPL-SCNC: 3.7 MMOL/L (ref 3.4–5.3)
POTASSIUM SERPL-SCNC: 4.6 MMOL/L (ref 3.4–5.3)
PROT SERPL-MCNC: 5.7 G/DL (ref 6.8–8.8)
RBC # BLD AUTO: 3.05 10E12/L (ref 3.8–5.2)
SODIUM SERPL-SCNC: 134 MMOL/L (ref 133–144)
SODIUM SERPL-SCNC: 139 MMOL/L (ref 133–144)
TRANSFUSION STATUS PATIENT QL: NORMAL
TRANSFUSION STATUS PATIENT QL: NORMAL
URATE SERPL-MCNC: 3.3 MG/DL (ref 2.6–6)
WBC # BLD AUTO: 2.2 10E9/L (ref 4–11)

## 2017-08-02 PROCEDURE — 25000132 ZZH RX MED GY IP 250 OP 250 PS 637: Performed by: INTERNAL MEDICINE

## 2017-08-02 PROCEDURE — 25000125 ZZHC RX 250: Performed by: INTERNAL MEDICINE

## 2017-08-02 PROCEDURE — 25000128 H RX IP 250 OP 636: Performed by: PHYSICIAN ASSISTANT

## 2017-08-02 PROCEDURE — 84550 ASSAY OF BLOOD/URIC ACID: CPT | Performed by: PHYSICIAN ASSISTANT

## 2017-08-02 PROCEDURE — 97530 THERAPEUTIC ACTIVITIES: CPT | Mod: GP

## 2017-08-02 PROCEDURE — 25000128 H RX IP 250 OP 636: Performed by: INTERNAL MEDICINE

## 2017-08-02 PROCEDURE — 85025 COMPLETE CBC W/AUTO DIFF WBC: CPT | Performed by: PHYSICIAN ASSISTANT

## 2017-08-02 PROCEDURE — 84100 ASSAY OF PHOSPHORUS: CPT | Performed by: PHYSICIAN ASSISTANT

## 2017-08-02 PROCEDURE — 25000132 ZZH RX MED GY IP 250 OP 250 PS 637: Performed by: STUDENT IN AN ORGANIZED HEALTH CARE EDUCATION/TRAINING PROGRAM

## 2017-08-02 PROCEDURE — 20000002 ZZH R&B BMT INTERMEDIATE

## 2017-08-02 PROCEDURE — 80076 HEPATIC FUNCTION PANEL: CPT | Performed by: PHYSICIAN ASSISTANT

## 2017-08-02 PROCEDURE — 83735 ASSAY OF MAGNESIUM: CPT

## 2017-08-02 PROCEDURE — 99233 SBSQ HOSP IP/OBS HIGH 50: CPT | Performed by: INTERNAL MEDICINE

## 2017-08-02 PROCEDURE — 99212 OFFICE O/P EST SF 10 MIN: CPT

## 2017-08-02 PROCEDURE — 80048 BASIC METABOLIC PNL TOTAL CA: CPT

## 2017-08-02 PROCEDURE — 25000132 ZZH RX MED GY IP 250 OP 250 PS 637: Performed by: PHYSICIAN ASSISTANT

## 2017-08-02 PROCEDURE — 83735 ASSAY OF MAGNESIUM: CPT | Performed by: PHYSICIAN ASSISTANT

## 2017-08-02 PROCEDURE — 85610 PROTHROMBIN TIME: CPT | Performed by: PHYSICIAN ASSISTANT

## 2017-08-02 PROCEDURE — P9012 CRYOPRECIPITATE EACH UNIT: HCPCS | Performed by: INTERNAL MEDICINE

## 2017-08-02 PROCEDURE — 40000344 ZZHCL STATISTIC THAWING COMPONENT: Performed by: INTERNAL MEDICINE

## 2017-08-02 PROCEDURE — 25000132 ZZH RX MED GY IP 250 OP 250 PS 637

## 2017-08-02 PROCEDURE — 40000193 ZZH STATISTIC PT WARD VISIT

## 2017-08-02 PROCEDURE — 83615 LACTATE (LD) (LDH) ENZYME: CPT | Performed by: PHYSICIAN ASSISTANT

## 2017-08-02 PROCEDURE — 97112 NEUROMUSCULAR REEDUCATION: CPT | Mod: GP

## 2017-08-02 PROCEDURE — 25000125 ZZHC RX 250

## 2017-08-02 PROCEDURE — 80048 BASIC METABOLIC PNL TOTAL CA: CPT | Performed by: PHYSICIAN ASSISTANT

## 2017-08-02 PROCEDURE — 00000146 ZZHCL STATISTIC GLUCOSE BY METER IP

## 2017-08-02 PROCEDURE — 97535 SELF CARE MNGMENT TRAINING: CPT | Mod: GO | Performed by: OCCUPATIONAL THERAPIST

## 2017-08-02 PROCEDURE — 25000131 ZZH RX MED GY IP 250 OP 636 PS 637: Performed by: INTERNAL MEDICINE

## 2017-08-02 PROCEDURE — 25000125 ZZHC RX 250: Performed by: PHYSICIAN ASSISTANT

## 2017-08-02 PROCEDURE — 25000132 ZZH RX MED GY IP 250 OP 250 PS 637: Performed by: NURSE PRACTITIONER

## 2017-08-02 PROCEDURE — 40000133 ZZH STATISTIC OT WARD VISIT: Performed by: OCCUPATIONAL THERAPIST

## 2017-08-02 PROCEDURE — 97116 GAIT TRAINING THERAPY: CPT | Mod: GP

## 2017-08-02 PROCEDURE — 84132 ASSAY OF SERUM POTASSIUM: CPT | Performed by: INTERNAL MEDICINE

## 2017-08-02 PROCEDURE — 85384 FIBRINOGEN ACTIVITY: CPT | Performed by: PHYSICIAN ASSISTANT

## 2017-08-02 RX ORDER — POTASSIUM CHLORIDE 750 MG/1
20-40 TABLET, EXTENDED RELEASE ORAL
Status: DISCONTINUED | OUTPATIENT
Start: 2017-08-02 | End: 2017-08-04 | Stop reason: HOSPADM

## 2017-08-02 RX ORDER — POTASSIUM CHLORIDE 1.5 G/1.58G
20-40 POWDER, FOR SOLUTION ORAL
Status: DISCONTINUED | OUTPATIENT
Start: 2017-08-02 | End: 2017-08-04 | Stop reason: HOSPADM

## 2017-08-02 RX ORDER — POTASSIUM CL/LIDO/0.9 % NACL 10MEQ/0.1L
10 INTRAVENOUS SOLUTION, PIGGYBACK (ML) INTRAVENOUS
Status: DISCONTINUED | OUTPATIENT
Start: 2017-08-02 | End: 2017-08-04 | Stop reason: HOSPADM

## 2017-08-02 RX ORDER — POTASSIUM CHLORIDE 7.45 MG/ML
10 INJECTION INTRAVENOUS
Status: DISCONTINUED | OUTPATIENT
Start: 2017-08-02 | End: 2017-08-04 | Stop reason: HOSPADM

## 2017-08-02 RX ORDER — MAGNESIUM SULFATE HEPTAHYDRATE 40 MG/ML
4 INJECTION, SOLUTION INTRAVENOUS EVERY 4 HOURS PRN
Status: DISCONTINUED | OUTPATIENT
Start: 2017-08-02 | End: 2017-08-04 | Stop reason: HOSPADM

## 2017-08-02 RX ORDER — POTASSIUM CHLORIDE 29.8 MG/ML
20 INJECTION INTRAVENOUS
Status: DISCONTINUED | OUTPATIENT
Start: 2017-08-02 | End: 2017-08-04 | Stop reason: HOSPADM

## 2017-08-02 RX ORDER — FUROSEMIDE 10 MG/ML
40 INJECTION INTRAMUSCULAR; INTRAVENOUS
Status: DISCONTINUED | OUTPATIENT
Start: 2017-08-02 | End: 2017-08-03

## 2017-08-02 RX ORDER — MIDODRINE HYDROCHLORIDE 5 MG/1
10 TABLET ORAL
Status: DISCONTINUED | OUTPATIENT
Start: 2017-08-02 | End: 2017-08-04 | Stop reason: HOSPADM

## 2017-08-02 RX ORDER — PROMETHAZINE HYDROCHLORIDE 25 MG/1
25 TABLET ORAL EVERY 6 HOURS PRN
Status: DISCONTINUED | OUTPATIENT
Start: 2017-08-02 | End: 2017-08-04 | Stop reason: HOSPADM

## 2017-08-02 RX ORDER — HYDRALAZINE HYDROCHLORIDE 20 MG/ML
10 INJECTION INTRAMUSCULAR; INTRAVENOUS EVERY 6 HOURS PRN
Status: DISCONTINUED | OUTPATIENT
Start: 2017-08-02 | End: 2017-08-04 | Stop reason: HOSPADM

## 2017-08-02 RX ORDER — POTASSIUM CHLORIDE 29.8 MG/ML
20 INJECTION INTRAVENOUS EVERY 4 HOURS
Status: DISCONTINUED | OUTPATIENT
Start: 2017-08-02 | End: 2017-08-02

## 2017-08-02 RX ADMIN — POTASSIUM CHLORIDE 20 MEQ: 29.8 INJECTION, SOLUTION INTRAVENOUS at 09:01

## 2017-08-02 RX ADMIN — FOLIC ACID 1 MG: 1 TABLET ORAL at 08:58

## 2017-08-02 RX ADMIN — MIDODRINE HYDROCHLORIDE 10 MG: 5 TABLET ORAL at 08:59

## 2017-08-02 RX ADMIN — ALLOPURINOL 300 MG: 300 TABLET ORAL at 08:59

## 2017-08-02 RX ADMIN — Medication 2 G: at 05:07

## 2017-08-02 RX ADMIN — POTASSIUM CHLORIDE 20 MEQ: 29.8 INJECTION, SOLUTION INTRAVENOUS at 06:43

## 2017-08-02 RX ADMIN — FLUCONAZOLE 200 MG: 200 TABLET ORAL at 08:59

## 2017-08-02 RX ADMIN — POTASSIUM CHLORIDE 20 MEQ: 29.8 INJECTION, SOLUTION INTRAVENOUS at 16:08

## 2017-08-02 RX ADMIN — HYDRALAZINE HYDROCHLORIDE 10 MG: 20 INJECTION INTRAMUSCULAR; INTRAVENOUS at 09:56

## 2017-08-02 RX ADMIN — ALTEPLASE 2 MG: 2.2 INJECTION, POWDER, LYOPHILIZED, FOR SOLUTION INTRAVENOUS at 17:14

## 2017-08-02 RX ADMIN — POTASSIUM CHLORIDE 20 MEQ: 29.8 INJECTION, SOLUTION INTRAVENOUS at 20:37

## 2017-08-02 RX ADMIN — PROMETHAZINE HYDROCHLORIDE 25 MG: 25 TABLET ORAL at 04:08

## 2017-08-02 RX ADMIN — GABAPENTIN 200 MG: 100 CAPSULE ORAL at 13:45

## 2017-08-02 RX ADMIN — ACYCLOVIR 400 MG: 400 TABLET ORAL at 21:37

## 2017-08-02 RX ADMIN — POTASSIUM CHLORIDE 20 MEQ: 29.8 INJECTION, SOLUTION INTRAVENOUS at 10:39

## 2017-08-02 RX ADMIN — Medication 350 MCG: at 20:36

## 2017-08-02 RX ADMIN — ACYCLOVIR 400 MG: 400 TABLET ORAL at 08:59

## 2017-08-02 RX ADMIN — DIGOXIN 125 MCG: 125 TABLET ORAL at 08:59

## 2017-08-02 RX ADMIN — GABAPENTIN 200 MG: 100 CAPSULE ORAL at 09:00

## 2017-08-02 RX ADMIN — POTASSIUM CHLORIDE 20 MEQ: 29.8 INJECTION, SOLUTION INTRAVENOUS at 05:06

## 2017-08-02 RX ADMIN — FUROSEMIDE 5 MG/HR: 10 INJECTION, SOLUTION INTRAVENOUS at 13:51

## 2017-08-02 RX ADMIN — Medication 12.5 MG: at 08:59

## 2017-08-02 RX ADMIN — DEXAMETHASONE 8 MG: 4 TABLET ORAL at 09:00

## 2017-08-02 RX ADMIN — MULTIPLE VITAMINS W/ MINERALS TAB 1 TABLET: TAB at 08:59

## 2017-08-02 RX ADMIN — ATENOLOL 25 MG: 25 TABLET ORAL at 08:58

## 2017-08-02 RX ADMIN — CALCIUM CARBONATE 600 MG (1,500 MG)-VITAMIN D3 400 UNIT TABLET 2 TABLET: at 09:04

## 2017-08-02 RX ADMIN — GABAPENTIN 200 MG: 100 CAPSULE ORAL at 21:37

## 2017-08-02 RX ADMIN — FUROSEMIDE 40 MG: 10 INJECTION, SOLUTION INTRAVENOUS at 16:07

## 2017-08-02 RX ADMIN — POTASSIUM CHLORIDE 20 MEQ: 29.8 INJECTION, SOLUTION INTRAVENOUS at 12:51

## 2017-08-02 RX ADMIN — THIAMINE HCL (VITAMIN B1) 50 MG TABLET 50 MG: at 09:00

## 2017-08-02 RX ADMIN — PANTOPRAZOLE SODIUM 40 MG: 40 TABLET, DELAYED RELEASE ORAL at 05:07

## 2017-08-02 RX ADMIN — PROMETHAZINE HYDROCHLORIDE 25 MG: 25 TABLET ORAL at 12:50

## 2017-08-02 NOTE — PLAN OF CARE
Problem: Goal Outcome Summary  Goal: Goal Outcome Summary  1. Pt will have fevers well controlled  2. Pt will be hemodynamically stable   OT 5C: Recommend ARU once medically stable to address deconditioning. Educated pt on proper body mechanics during self cares. Issued pt built up handle to assist with L hand weakness. Pt completed series of fine motor exercises. Pt and  asked appropriate questions and were engaged throughout session.

## 2017-08-02 NOTE — PROGRESS NOTES
Social Work Services Progress Note     Hospital Day: 44  Date of Initial Social Work Evaluation:  6/20/17-[place]  Collaborated with:  Hematology team, [facility] admissions  Data:  Received update from team re: medical status. Anticipate DC ready either Thursday or Friday, PT/OT and PM&R recommendation for ARU.  Intervention:  Follow up with [facility] admissions.   Assessment:  see bedside RN, PT/OT and provider notes  Plan:    Anticipated Disposition:  Facility:  [facility]    Barriers to d/c plan:  not medically ready     Follow Up:  SW will continue to follow and assist with DC planning        DENNIS Portillo, MSW  7D  (Hem/Onc)  Pager: [phone]  8/2/2017

## 2017-08-02 NOTE — PROGRESS NOTES
[facility], [place]    Hematology / Oncology Progress Note    Date of Service (when I saw the patient): 08/02/2017     Assessment & Plan   Amelia Michel is a 56 year old female who was admitted on 6/19/2017. She has complex past medical history of prior VSD repair, rheumatoid arthritis on long-term methotrexate, recent dx atrial fibrillation/flutter/SVT who was recently admitted to [facility] on 5/29 after an out of hospital cardiac arrest, admission complicated by intubation with discharge to TCU. Was discharged to TCU and subsequently readmitted to Cardiology service for FUO. With workup of fever and progressive cytopenias, diagnosis of DLBCL was made. She was transferred to the Hematology service on 7/26. See discussions of diagnosis and reasoning for treatment plan in 7/27 Heme/Onc note. Started Cycle 1 R-EPOCH on 7/28.     HEME:    #DLBCL. Stage 4 with bone marrow and liver involvement. IPI 4. Possibly related to prior methotrexate.   - PET/CT 7/18 revealing for mediastinal and neck level 2A lymphadenopathy, extensive FDG-avid bony lesions, hepatosplenic FDG-avid lesions, pelvic lesions contingous with the uterus, and bilateral pleural effusions.    - Bone marrow biopsy 7/12: scattered plasma cells in perivascular clusters, with no definitive staining of B-cell population by CD5; on re-review, possibly consistent with intravascularge large B-cell lymphoma. However, liver biopsy (7/21) consistent with DLBCL, negative for BCL2/BCL6/MYC rearrangements, CD20 positive.   - PICC line placed    Treatment Plan: DA-EPOCH-R Cycle 1 (Day 1=7/28/17). Today is Day 6.   - reaction to rituxan (hypotension with BP 87/45, SOB/feeling of ?throat swelling/difficulty getting breath in; then fever/rigors). No stridor. No rashes or hives. Received rescue methylpred and demerol). -- completed  - Etoposide CIVI on Days 1-4 -- completed  - Vincristine/Doxorubicin CIVI on Days 1-4 -- completed  - Prednisone  50mg BID x 5 days -- completed  - Cytoxan on Day 5 -- completed  - Neupogen support starting D6    #Hyperuricemia  #Monitor for TLS.  Uric acid up to 8.2 (7/27 PM), s/p Rasburicase. Uric acid improved to 4.2. Continue Allopurinol. Daily labs given recent stability. Back off on frequency of Mg/Phos/uric acid when appropriate after chemotherapy.     #DIC. INR prolonged, now improving. Fibrinogen currently ok.   - conditional transfusion parameters in place: 2U plasma for INR >1.8, 5U cryo for fibrinogen <150.   - started Vitamin K 10mg PO x 3 days (7/28-7/30)  - adjust coag monitoring to qMWF x 4 occurrences; adjust if needed    #Pancytopenia, secondary to DLBCL  - transfuse to maintain Hgb >8.0 (keep given recent cardiac history), platelets >10,000    ID:   #PPX  - continue ppx ACV, Levaquin, Fluconazole     #Persistent Fevers.  #Lactic acidosis.   Previously followed by ID, who signed off 7/21.  Extensive infectious evaluation including blood cultures, which remain NGTD. Additionally, stool virus panel, M. Tuberculsosis, tick-borne illnesses, and bartonella/Q-fever have all been negative. Fevers/lactate secondary to malignancy.   - possible pneumonia while recently on meropenem (7/1-7/5);  CT chest (7/13) did reveal a right lung tree-in-bud opacities with more consolidative opacities in RLL; but no accompanying focal symptoms. Appears to have received dose of Meropenem on 7/14, now off.   - lactic acid increased to 10.4 (7/28) during Rituxan infusion reaction.   - cultures have been NGTD, avoid additional fluids given hypervolemia, monitor closely    CV/PV:    #Atrial fibrillation/flutter   #Recent out of hospital cardiac arrest (5/29/17)  #Prior VSD repair (1969)  - PET scan suggested FDG-avid lesions leading to thickening of the interatrial septum, extending superiorly along the main pulmonary artery; although cardiac MRI 7/24 shows no evidence of infiltrative disease    - Appreciate Cardiology team recs  -  continue Digoxin 125mcg daily. Digoxin level within range on 7/30 AM.   - continue Atenolol 25mg daily (decreased 7/30). Note, per pt/, metoprolol has not worked for her and makes her feel poorly.  PT also notes that she does NOT want amiodarone, as there is concern that this led to her cardiac arrest (and I will look further into this).   - continuous telemetry    #Bradycardia. HRs 39-50s (7/28-7/29). On tele strips and formal EKG 7/29 this appeared to be sinus shelton. D/w Cards team who agree with decreasing atenolol dose. Checked dig level as above. Cardiology team continues to follow.      #Aortic masses. BRE on 7/14 showed 3 small masses on the coronary cusps and LVOT. Do not believe this to be endocarditis; no antibiotics at this time. Unclear what this represents; possibly could be Libmann-Sacks.      #Anasarca, volume overload  - Torsemide 40 mg BID with additional PRN. Per Cards (7/28): changed diuretic to Lasix gtt at 5mg/hr given fluids with CIVI chemo (which is generally around 70cc/hr). This may also help with additional diuresis with increased blood product needs.   - Midodrine to aid in renal perfusion -- will hold midodrine while her blood pressures are elevated. May resume when BPs normalize.  - 2g Na limit  - Continue spironolactone 12.5mg     GI:   #Nausea, mild.   - will now have scheduled antiemetic (Zofran, Emend)) with chemo, PRN Compazine/Ativan    #LFT derangement. 2/2 lymphoma involvement. ALT/AST significantly improved from prior. Alk phos more elevated today, but will monitor for fluctuations. Tbili has been elevated (chemo adjusted), now downtrending.   - monitor daily through chemo, then adjust frequency as indicated    #Constipation vs. diarrhea. Stools harder on 7/28. With scheduled laxatives, pt developed looser stools. Backed off on scheduled bowel regimen. Stools now soft.     NEURO:  #Saddle anesthesia, urinary incontinence. Pt reports this is baseline for her. Unclear  etiology. Was initially seen in ED and evaluated by NSG in 03/2016 for this without clear explanation for symptoms. She has been noted to have an S1 radiculopathy. Seen by Neuro in 06/2017, formal consult placed while here to follow-up. Suggested could do urodynamic study on outpatient basis, also recommended looking for disease in CSF.    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 HOLDING PTA Prednisone with plan for higher dose steroids on treatment plan. Resume 5mg dose as of 8/4.   - Follow-up [facility] Dr. Conor Cunha in 4-6 weeks after discharge    ENDO:  #Steroid induced hyperglycemia. Added QID BG check and med SSI.     DERM:   #Extravasation-related LUE wound. Slowly improving.   - WOCN following, appreciate recs    GEN:  #Deconditioning. 2/2 prolonged hospital stays, acute illness/malignancy  - PT/OT. Recent rec for ARU, so PM&R consult placed.     FEN  - 2g Na diet  - Dietician following    #Hypokalemia. 2/2 lasix. Started PO supplement in addition to sliding scale (currently high given cardiac issues/acute illness). Increase scheduled IV K+ replacement to 20mEq q4h in addition to high sliding scale replacement, only IV. Will get BID BMP lab checks.     PPx:   - VTE: defer VTE pharmacologic ppx due to thrombocytopenia  - GI: continue protonix    CODE: Full Code    Disposition: Inpatient hospitalization until completion of cycle 1 of chemotherapy and when medically ready. Will d/c to ARU.      Shyann Marinelli PA-C  Hematology/Oncology  Pager: [phone]    Interval History   Continues to have arrhythmias recorded overnight, asymptomatic. K+ is low despite replacement. Tolerated Cytoxan well. Overall is feeling  fatigued, but still motivated to continue with therapy. Denies n/v/d/c.  at bedside and supportive.     Physical Exam   Temp: 96.5  F (35.8  C) Temp src: Axillary BP: (!) 148/92   Heart Rate: 62 Resp: 18 SpO2: 98 % O2 Device: None (Room air)    Vitals:    07/31/17 0854 08/01/17 0741 08/02/17 0719   Weight: 66.9 kg (147 lb 6.4 oz) 67.4 kg (148 lb 9.6 oz) 66.5 kg (146 lb 8 oz)     Vital Signs with Ranges  Temp:  [96.4  F (35.8  C)-97.4  F (36.3  C)] 96.5  F (35.8  C)  Heart Rate:  [59-71] 62  Resp:  [16-18] 18  BP: (148-168)/() 148/92  SpO2:  [92 %-98 %] 98 %  I/O last 3 completed shifts:  In: 3707.1 [P.O.:1280; I.V.:1100; IV Piggyback:1327.1]  Out: 5000 [Urine:5000]  Constitutional: Pleasant and cooperative female seen sitting up in a chair. Awake, alert, NAD.  HEENT: NC/AT, EOMI, sclera clear, conjunctiva normal, OP with MMM, small white ulcerated lesion noted on lower gumline  Respiratory: No increased work of breathing, CTAB, no crackles or wheezing.  Cardiovascular: Irregular rhythm, rate controlled, no murmur noted. 1+ pitting edema in hips, LE edema improved with compression stockings.  GI: Normal bowel sounds, soft, non-distended and non-tender.  Skin: Warm, dry, well-perfused. No bruising, bleeding, rashes, or lesions on limited exam.  Neurologic: A&O. Answers questions appropriately. Moves all extremities spontaneously.  Neuropsychiatric: Calm, appropriate affect    Medications     furosemide (LASIX) infusion ADULT 5 mg/hr (08/02/17 1351)     - MEDICATION INSTRUCTIONS -       NaCl       IV fluid REPLACEMENT ONLY       - MEDICATION INSTRUCTIONS -         potassium chloride  20 mEq Intravenous Q4H     spironolactone  12.5 mg Oral Daily     promethazine  25 mg Oral Q8H     insulin aspart  1-10 Units Subcutaneous TID AC     insulin aspart  1-7 Units Subcutaneous At Bedtime     atenolol  25 mg Oral Daily     melatonin  1-3 mg Oral At Bedtime     allopurinol  300 mg Oral Daily     acyclovir  400 mg  Oral BID     fluconazole  200 mg Oral Daily     dexamethasone  8 mg Oral Daily     filgrastim (NEUPOGEN/GRANIX) intravenous  5 mcg/kg (Treatment Plan Recorded) Intravenous Daily at 8 pm     sodium chloride (PF)  10 mL Intracatheter Q7 Days     [START ON 8/4/2017] predniSONE  5 mg Oral Daily     morphine 0.1% in solosite  2 g Transdermal Daily     sodium chloride (PF)  1-74 mL Intracatheter Q8H     vitamin  B-1  50 mg Oral Daily     folic acid  1 mg Oral Daily     miconazole   Topical BID     pantoprazole  40 mg Oral QAM     digoxin  125 mcg Oral Daily     sodium chloride (PF)  10 mL Intracatheter Q8H     gabapentin  200 mg Oral TID     multivitamin, therapeutic with minerals  1 tablet Oral Daily     calcium-vitamin D  2 tablet Oral Daily       Data   Results for orders placed or performed during the hospital encounter of 06/19/17 (from the past 24 hour(s))   Glucose by meter   Result Value Ref Range    Glucose 204 (H) 70 - 99 mg/dL   Glucose by meter   Result Value Ref Range    Glucose 230 (H) 70 - 99 mg/dL   Basic metabolic panel   Result Value Ref Range    Sodium 139 133 - 144 mmol/L    Potassium 2.9 (L) 3.4 - 5.3 mmol/L    Chloride 96 94 - 109 mmol/L    Carbon Dioxide 35 (H) 20 - 32 mmol/L    Anion Gap 9 3 - 14 mmol/L    Glucose 191 (H) 70 - 99 mg/dL    Urea Nitrogen 27 7 - 30 mg/dL    Creatinine 0.50 (L) 0.52 - 1.04 mg/dL    GFR Estimate >90  Non  GFR Calc   >60 mL/min/1.7m2    GFR Estimate If Black >90   GFR Calc   >60 mL/min/1.7m2    Calcium 7.6 (L) 8.5 - 10.1 mg/dL   CBC with platelets differential   Result Value Ref Range    WBC 2.2 (L) 4.0 - 11.0 10e9/L    RBC Count 3.05 (L) 3.8 - 5.2 10e12/L    Hemoglobin 8.9 (L) 11.7 - 15.7 g/dL    Hematocrit 27.6 (L) 35.0 - 47.0 %    MCV 91 78 - 100 fl    MCH 29.2 26.5 - 33.0 pg    MCHC 32.2 31.5 - 36.5 g/dL    RDW 25.6 (H) 10.0 - 15.0 %    Platelet Count 19 (LL) 150 - 450 10e9/L    Diff Method Automated Method     % Neutrophils 77.8 %     % Lymphocytes 17.1 %    % Monocytes 1.4 %    % Eosinophils 0.0 %    % Basophils 0.0 %    % Immature Granulocytes 3.7 %    Nucleated RBCs 0 0 /100    Absolute Neutrophil 1.7 1.6 - 8.3 10e9/L    Absolute Lymphocytes 0.4 (L) 0.8 - 5.3 10e9/L    Absolute Monocytes 0.0 0.0 - 1.3 10e9/L    Absolute Eosinophils 0.0 0.0 - 0.7 10e9/L    Absolute Basophils 0.0 0.0 - 0.2 10e9/L    Abs Immature Granulocytes 0.1 0 - 0.4 10e9/L    Absolute Nucleated RBC 0.0    Fibrinogen activity   Result Value Ref Range    Fibrinogen 139 (L) 200 - 420 mg/dL   INR   Result Value Ref Range    INR 1.44 (H) 0.86 - 1.14   Lactate Dehydrogenase   Result Value Ref Range    Lactate Dehydrogenase 302 (H) 81 - 234 U/L   Magnesium   Result Value Ref Range    Magnesium 2.0 1.6 - 2.3 mg/dL   Phosphorus   Result Value Ref Range    Phosphorus 2.9 2.5 - 4.5 mg/dL   Uric acid   Result Value Ref Range    Uric Acid 3.3 2.6 - 6.0 mg/dL   Hepatic panel   Result Value Ref Range    Bilirubin Direct 0.7 (H) 0.0 - 0.2 mg/dL    Bilirubin Total 1.3 0.2 - 1.3 mg/dL    Albumin 2.8 (L) 3.4 - 5.0 g/dL    Protein Total 5.7 (L) 6.8 - 8.8 g/dL    Alkaline Phosphatase 261 (H) 40 - 150 U/L    ALT 86 (H) 0 - 50 U/L    AST 82 (H) 0 - 45 U/L   Glucose by meter   Result Value Ref Range    Glucose 173 (H) 70 - 99 mg/dL   Glucose by meter   Result Value Ref Range    Glucose 240 (H) 70 - 99 mg/dL

## 2017-08-02 NOTE — PROGRESS NOTES
[facility] Nurse Inpatient Wound Assessment     Re Assessment of wound(s) on pt's:   Left arm    Data:     Patient History:      per MD note(s):  56 year old female with PMHx of VSD s/p repair (), RA, recently diagnosed atrial fibrillation that was complicated with an OOH cardiac arrest (felt 2/2 long pause with degeneration to VFib while converting Afib to SR with multiple AV prieto agents) who was readmitted from ARU on  with fever and concern for worsening LUE infection. She has had a complicated medical course, and is now on the cardiology service as primary since  for further workup and evaluation of recurrent lactic acidosis, persistent afib and softer blood pressures with tachypnea. Now with concern for malignancy; likely Bcell lymphoma.     left arm extravasation site    Moisture Management:  Dressings    Current Diet / Nutrition:         Active Diet Order      2 Gram Sodium Diet        Jay Score  Av.3  Min: 9  Max: 23     Labs  Recent Labs   Lab Test  17   0402   17   0746   17   1018   ALBUMIN  2.8*   < >  2.5*   < >  1.9*   HGB  8.9*   < >  7.8*   < >  8.3*   INR  1.44*   < >   --    < >  3.38*   WBC  2.2*   < >  2.2*   < >  7.5   A1C   --    --    --    --   Canceled, Test credited   UNABLE TO CALCULATE % HBA1C DUE TO LOW HGB AND/OR LOW HGBA1C  NOTIFIED CHELSEA BEE RN AT 1253 ON 17 [facility]     CRP   --    --   83.0*   < >   --     < > = values in this interval not displayed.            Wound Assessment :   Left arm  Wound History:  Extravasation wound with large area of epidermal loss                                17          Wound Base: 5% adherent tan necrosis on distal wound and 100% yellow slough    Specific Dimensions (length x width x depth, in cm) :  3 separate wounds now with healed scar tissue between:     Proximal to  distal- 0.2 x 0.2 x 0.3, 2.2 x 1.2 x 0.6 and 2.2 x 1 x 0.5    Noted minimal undermining on 2nd middle wound from 7-3 O'clock, measured at 0.3cm    Palpation of the wound bed:   Boggy     Periwound Skin: intact, minimal pink erythema     Drainage:  .Amount: moderate . Color: thick yellow liquefying slough and thin yellow serous     Odor: none  Pain:  minimal per patient is attributing increased pain to the cleaning of wound bed.          Intervention:     Patient's chart evaluated.      Wound(s) was assessed    Wound Care: was done: changed dressing, Visual inspection    Orders reviewed and updated  Discussed plan of care with  Patient, family and nurse, and answered questions regarding wound healing process        Assessment:           Slowly healing large area full thickness wound with at area of necrosis 2/2 extravastion.  Measurements improved and slough is loosening well with use of Medihoney.    Recommend hand therapy soon to avoid contracture        Plan:     Nursing to notify the Provider(s) and re-consult the [facility] Nurse if wound(s) deteriorate(s).    Plan of care for wound located on left arm every other day:     Clean wound and skin around wound with Microklenz.     -Apply Cavilon no-sting to periwound skin.    -Apply Medihoney nickel thick to the areas with slough.      Cover with 2 Mepilex border dressings.      [facility] Nurse will return: weekly     Face to face time: 30 minutes

## 2017-08-02 NOTE — PLAN OF CARE
Problem: Goal Outcome Summary  Goal: Goal Outcome Summary  1. Pt will have fevers well controlled  2. Pt will be hemodynamically stable   PT 5C: Pt reports continued feeling sluggish today but willing to attempt. Pt ambulates x 125', 170', and 55' distances with 4WW and SBA - CGA today. Pt requires 1 seated rest break. Completes static balance activities in room. Requires Min A for return to bed for LE support. HR up to 125 during ambulation, predominately upper 60s to low 80s throughout session; 1 instance of dropping into 40's while seated EOB, returns to mid 70s within 15 seconds, no symptoms reported, O2 sats 99% on 1 LPM with activity.     Recommend ARU once medically appropriate to increase strength, functional mobility and activity tolerance.

## 2017-08-02 NOTE — PLAN OF CARE
Problem: Individualization  Goal: Patient Preferences  Outcome: No Change  On tele. Irregular heartrate Pvc's, bigeminy. Sore on gums on the bottom. bp 168/91 and 159/96 and received hydralazine. bp recheck 148/92. Held the midodrine per MD. 39 grams of carbs for breakfast and 45 for lunch. No appetite. No nausea. No pain. Sat in the chair and worked with therapy. WOC nurse changed left arm dressing. Change dressing every other day per active orders. Medium stool. Assist x 2. Replaced K+ and will need a recheck around 16:00.

## 2017-08-02 NOTE — PLAN OF CARE
Problem: Goal Outcome Summary  Goal: Goal Outcome Summary  1. Pt will have fevers well controlled  2. Pt will be hemodynamically stable   Outcome: No Change  Pt continues to have an irregular heart rate with elevated BPs in 150s-160s/90s-100s.  Pt has scant drainage on her LUE dressing.  40 meq IV k given, 2 grams IV mag given.  20 meq IV k still needed.

## 2017-08-02 NOTE — PROGRESS NOTES
Care Coordinator- Discharge Planning     Admission Date/Time:  6/19/2017  Attending MD:  Lenore Rodriguez MD  Hematologist: Dr. Rodriguez/[facility]     Data  Date of initial CC assessment:    Chart reviewed, discussed with interdisciplinary team.   Patient was admitted for:   1. Diffuse large B-cell lymphoma of extranodal site (H)         Assessment  Concerns with insurance coverage for discharge needs: None.  Current Living Situation: Patient lives with spouse.  Support System: Supportive and Involved  Services Involved: ARU  Transportation: [facility]  Barriers to Discharge: Medical Stability    Coordination of Care and Referrals: Social work following for ARU.    Per CRISTA Bowser, patient may be stable to discharge in 1-2 days. Therapies recommend ARU; patient in agreement.       Plan  Anticipated Discharge Date:  08/02/2017  Anticipated Discharge Plan:  ARU    CTS Handoff completed:  NO (to be sent upon discharge)    Gloria Rhoades RNCC  Phone [phone]  Pager [phone]

## 2017-08-03 ENCOUNTER — APPOINTMENT (OUTPATIENT)
Dept: PHYSICAL THERAPY | Facility: CLINIC | Age: 57
DRG: 871 | End: 2017-08-03
Attending: INTERNAL MEDICINE
Payer: COMMERCIAL

## 2017-08-03 ENCOUNTER — APPOINTMENT (OUTPATIENT)
Dept: OCCUPATIONAL THERAPY | Facility: CLINIC | Age: 57
DRG: 871 | End: 2017-08-03
Attending: INTERNAL MEDICINE
Payer: COMMERCIAL

## 2017-08-03 LAB
ALBUMIN SERPL-MCNC: 2.9 G/DL (ref 3.4–5)
ALP SERPL-CCNC: 234 U/L (ref 40–150)
ALT SERPL W P-5'-P-CCNC: 91 U/L (ref 0–50)
ANION GAP SERPL CALCULATED.3IONS-SCNC: 5 MMOL/L (ref 3–14)
ANION GAP SERPL CALCULATED.3IONS-SCNC: 6 MMOL/L (ref 3–14)
ANISOCYTOSIS BLD QL SMEAR: ABNORMAL
AST SERPL W P-5'-P-CCNC: 69 U/L (ref 0–45)
BACTERIA SPEC CULT: NO GROWTH
BACTERIA SPEC CULT: NO GROWTH
BASOPHILS # BLD AUTO: 0 10E9/L (ref 0–0.2)
BASOPHILS NFR BLD AUTO: 0 %
BILIRUB DIRECT SERPL-MCNC: 0.6 MG/DL (ref 0–0.2)
BILIRUB SERPL-MCNC: 1.2 MG/DL (ref 0.2–1.3)
BUN SERPL-MCNC: 30 MG/DL (ref 7–30)
BUN SERPL-MCNC: 37 MG/DL (ref 7–30)
CALCIUM SERPL-MCNC: 8.2 MG/DL (ref 8.5–10.1)
CALCIUM SERPL-MCNC: 8.2 MG/DL (ref 8.5–10.1)
CHLORIDE SERPL-SCNC: 90 MMOL/L (ref 94–109)
CHLORIDE SERPL-SCNC: 94 MMOL/L (ref 94–109)
CO2 SERPL-SCNC: 36 MMOL/L (ref 20–32)
CO2 SERPL-SCNC: 36 MMOL/L (ref 20–32)
CREAT SERPL-MCNC: 0.54 MG/DL (ref 0.52–1.04)
CREAT SERPL-MCNC: 0.56 MG/DL (ref 0.52–1.04)
DIFFERENTIAL METHOD BLD: ABNORMAL
EOSINOPHIL # BLD AUTO: 0 10E9/L (ref 0–0.7)
EOSINOPHIL NFR BLD AUTO: 0 %
ERYTHROCYTE [DISTWIDTH] IN BLOOD BY AUTOMATED COUNT: 25.4 % (ref 10–15)
GFR SERPL CREATININE-BSD FRML MDRD: ABNORMAL ML/MIN/1.7M2
GFR SERPL CREATININE-BSD FRML MDRD: ABNORMAL ML/MIN/1.7M2
GLUCOSE SERPL-MCNC: 199 MG/DL (ref 70–99)
GLUCOSE SERPL-MCNC: 245 MG/DL (ref 70–99)
HCT VFR BLD AUTO: 26.1 % (ref 35–47)
HGB BLD-MCNC: 8.6 G/DL (ref 11.7–15.7)
LDH SERPL L TO P-CCNC: 293 U/L (ref 81–234)
LYMPHOCYTES # BLD AUTO: 0.2 10E9/L (ref 0.8–5.3)
LYMPHOCYTES NFR BLD AUTO: 6.1 %
MAGNESIUM SERPL-MCNC: 2.1 MG/DL (ref 1.6–2.3)
MAGNESIUM SERPL-MCNC: 2.4 MG/DL (ref 1.6–2.3)
MCH RBC QN AUTO: 30.2 PG (ref 26.5–33)
MCHC RBC AUTO-ENTMCNC: 33 G/DL (ref 31.5–36.5)
MCV RBC AUTO: 92 FL (ref 78–100)
MICRO REPORT STATUS: NORMAL
MICRO REPORT STATUS: NORMAL
MONOCYTES # BLD AUTO: 0 10E9/L (ref 0–1.3)
MONOCYTES NFR BLD AUTO: 0 %
NEUTROPHILS # BLD AUTO: 2.8 10E9/L (ref 1.6–8.3)
NEUTROPHILS NFR BLD AUTO: 93.9 %
PHOSPHATE SERPL-MCNC: 2.3 MG/DL (ref 2.5–4.5)
PLATELET # BLD AUTO: 19 10E9/L (ref 150–450)
PLATELET # BLD EST: ABNORMAL 10*3/UL
POIKILOCYTOSIS BLD QL SMEAR: SLIGHT
POTASSIUM SERPL-SCNC: 4.2 MMOL/L (ref 3.4–5.3)
POTASSIUM SERPL-SCNC: 4.5 MMOL/L (ref 3.4–5.3)
PROT SERPL-MCNC: 5.7 G/DL (ref 6.8–8.8)
RBC # BLD AUTO: 2.85 10E12/L (ref 3.8–5.2)
RBC INCLUSIONS BLD: SLIGHT
SODIUM SERPL-SCNC: 132 MMOL/L (ref 133–144)
SODIUM SERPL-SCNC: 135 MMOL/L (ref 133–144)
SPECIMEN SOURCE: NORMAL
SPECIMEN SOURCE: NORMAL
STOMATOCYTES BLD QL SMEAR: SLIGHT
WBC # BLD AUTO: 3 10E9/L (ref 4–11)

## 2017-08-03 PROCEDURE — 80048 BASIC METABOLIC PNL TOTAL CA: CPT

## 2017-08-03 PROCEDURE — 40000133 ZZH STATISTIC OT WARD VISIT: Performed by: OCCUPATIONAL THERAPIST

## 2017-08-03 PROCEDURE — 25000132 ZZH RX MED GY IP 250 OP 250 PS 637: Performed by: STUDENT IN AN ORGANIZED HEALTH CARE EDUCATION/TRAINING PROGRAM

## 2017-08-03 PROCEDURE — 25000132 ZZH RX MED GY IP 250 OP 250 PS 637: Performed by: PHYSICIAN ASSISTANT

## 2017-08-03 PROCEDURE — 25000132 ZZH RX MED GY IP 250 OP 250 PS 637: Performed by: INTERNAL MEDICINE

## 2017-08-03 PROCEDURE — 25000131 ZZH RX MED GY IP 250 OP 636 PS 637: Performed by: INTERNAL MEDICINE

## 2017-08-03 PROCEDURE — 85025 COMPLETE CBC W/AUTO DIFF WBC: CPT | Performed by: PHYSICIAN ASSISTANT

## 2017-08-03 PROCEDURE — 25000132 ZZH RX MED GY IP 250 OP 250 PS 637: Performed by: NURSE PRACTITIONER

## 2017-08-03 PROCEDURE — 25000125 ZZHC RX 250: Performed by: INTERNAL MEDICINE

## 2017-08-03 PROCEDURE — 80076 HEPATIC FUNCTION PANEL: CPT | Performed by: PHYSICIAN ASSISTANT

## 2017-08-03 PROCEDURE — 25000128 H RX IP 250 OP 636: Performed by: INTERNAL MEDICINE

## 2017-08-03 PROCEDURE — 97110 THERAPEUTIC EXERCISES: CPT | Mod: GP

## 2017-08-03 PROCEDURE — 20000002 ZZH R&B BMT INTERMEDIATE

## 2017-08-03 PROCEDURE — 40000193 ZZH STATISTIC PT WARD VISIT

## 2017-08-03 PROCEDURE — 25000132 ZZH RX MED GY IP 250 OP 250 PS 637

## 2017-08-03 PROCEDURE — 25000125 ZZHC RX 250: Performed by: PHYSICIAN ASSISTANT

## 2017-08-03 PROCEDURE — 82962 GLUCOSE BLOOD TEST: CPT

## 2017-08-03 PROCEDURE — 83735 ASSAY OF MAGNESIUM: CPT | Performed by: PHYSICIAN ASSISTANT

## 2017-08-03 PROCEDURE — 99233 SBSQ HOSP IP/OBS HIGH 50: CPT | Performed by: INTERNAL MEDICINE

## 2017-08-03 PROCEDURE — 97116 GAIT TRAINING THERAPY: CPT | Mod: GP

## 2017-08-03 PROCEDURE — 80048 BASIC METABOLIC PNL TOTAL CA: CPT | Performed by: PHYSICIAN ASSISTANT

## 2017-08-03 PROCEDURE — 84100 ASSAY OF PHOSPHORUS: CPT | Performed by: PHYSICIAN ASSISTANT

## 2017-08-03 PROCEDURE — 83615 LACTATE (LD) (LDH) ENZYME: CPT | Performed by: PHYSICIAN ASSISTANT

## 2017-08-03 PROCEDURE — 97535 SELF CARE MNGMENT TRAINING: CPT | Mod: GO | Performed by: OCCUPATIONAL THERAPIST

## 2017-08-03 PROCEDURE — 83735 ASSAY OF MAGNESIUM: CPT

## 2017-08-03 RX ORDER — POTASSIUM CHLORIDE 29.8 MG/ML
20 INJECTION INTRAVENOUS
Status: COMPLETED | OUTPATIENT
Start: 2017-08-03 | End: 2017-08-03

## 2017-08-03 RX ORDER — FUROSEMIDE 20 MG
40 TABLET ORAL 2 TIMES DAILY
Status: DISCONTINUED | OUTPATIENT
Start: 2017-08-03 | End: 2017-08-04 | Stop reason: HOSPADM

## 2017-08-03 RX ADMIN — Medication 12.5 MG: at 09:33

## 2017-08-03 RX ADMIN — GABAPENTIN 200 MG: 100 CAPSULE ORAL at 09:31

## 2017-08-03 RX ADMIN — GABAPENTIN 200 MG: 100 CAPSULE ORAL at 20:11

## 2017-08-03 RX ADMIN — ACYCLOVIR 400 MG: 400 TABLET ORAL at 09:31

## 2017-08-03 RX ADMIN — FUROSEMIDE 40 MG: 20 TABLET ORAL at 20:11

## 2017-08-03 RX ADMIN — POTASSIUM CHLORIDE 20 MEQ: 29.8 INJECTION, SOLUTION INTRAVENOUS at 12:46

## 2017-08-03 RX ADMIN — CALCIUM CARBONATE 600 MG (1,500 MG)-VITAMIN D3 400 UNIT TABLET 2 TABLET: at 09:31

## 2017-08-03 RX ADMIN — SENNOSIDES 1 TABLET: 8.6 TABLET, FILM COATED ORAL at 20:19

## 2017-08-03 RX ADMIN — DIGOXIN 125 MCG: 125 TABLET ORAL at 09:31

## 2017-08-03 RX ADMIN — SODIUM PHOSPHATE, MONOBASIC, MONOHYDRATE AND SODIUM PHOSPHATE, DIBASIC, ANHYDROUS 15 MMOL: 276; 142 INJECTION, SOLUTION INTRAVENOUS at 09:34

## 2017-08-03 RX ADMIN — POTASSIUM CHLORIDE 20 MEQ: 29.8 INJECTION, SOLUTION INTRAVENOUS at 14:01

## 2017-08-03 RX ADMIN — DEXAMETHASONE 8 MG: 4 TABLET ORAL at 09:32

## 2017-08-03 RX ADMIN — FLUCONAZOLE 200 MG: 200 TABLET ORAL at 09:34

## 2017-08-03 RX ADMIN — MULTIPLE VITAMINS W/ MINERALS TAB 1 TABLET: TAB at 09:39

## 2017-08-03 RX ADMIN — GABAPENTIN 200 MG: 100 CAPSULE ORAL at 13:34

## 2017-08-03 RX ADMIN — PANTOPRAZOLE SODIUM 40 MG: 40 TABLET, DELAYED RELEASE ORAL at 09:30

## 2017-08-03 RX ADMIN — FOLIC ACID 1 MG: 1 TABLET ORAL at 09:31

## 2017-08-03 RX ADMIN — ALLOPURINOL 300 MG: 300 TABLET ORAL at 09:32

## 2017-08-03 RX ADMIN — FUROSEMIDE 40 MG: 10 INJECTION, SOLUTION INTRAVENOUS at 09:33

## 2017-08-03 RX ADMIN — ACYCLOVIR 400 MG: 400 TABLET ORAL at 20:11

## 2017-08-03 RX ADMIN — ATENOLOL 25 MG: 25 TABLET ORAL at 09:32

## 2017-08-03 RX ADMIN — Medication 350 MCG: at 20:11

## 2017-08-03 RX ADMIN — THIAMINE HCL (VITAMIN B1) 50 MG TABLET 50 MG: at 09:33

## 2017-08-03 NOTE — PLAN OF CARE
Problem: Goal Outcome Summary  Goal: Goal Outcome Summary  1. Pt will have fevers well controlled  2. Pt will be hemodynamically stable   OT 5C: Recommend discharge to ARU to address deconditioning. Pt complete LB dressing and increased standing tolerance with g/h at sink. Functionally ambulated around room with CGA and FWW.  Pt left in chair with all needs met.

## 2017-08-03 NOTE — PROGRESS NOTES
Social Work Services Discharge Note      Patient Name:  Amelia Michel     Anticipated Discharge Date:  8/4/2017    Discharge Disposition:   ARU:  [place] [facility]     Additional Services/Equipment Arranged:  Transport arranged through [employer] (p: [phone]) via wheelchair medical van at 11:15am.       Patient / Family response to discharge plan:  In agreement with DC to [facility] ARU     Persons notified of above discharge plan:  Hematology team, 5C RN staff,  [facility] ARU admissions    CTS Handoff completed:  Will complete upon DC    Medicare Notice of Rights provided to the patient/family:        DENNIS Portillo, MSW  7D  (Hem/Onc)  Pager: [phone]  8/3/2017

## 2017-08-03 NOTE — PLAN OF CARE
Problem: Goal Outcome Summary  Goal: Goal Outcome Summary  1. Pt will have fevers well controlled  2. Pt will be hemodynamically stable   Outcome: No Change  Afebrile, VSS on room air. Denies pain and nausea. Huynh catheter with good output. Tele afib and bradycardia. No other complaints.

## 2017-08-03 NOTE — PLAN OF CARE
Problem: Goal Outcome Summary  Goal: Goal Outcome Summary  1. Pt will have fevers well controlled  2. Pt will be hemodynamically stable   PT 5C: Pt ambulates w/ 4WW and SBA-CGA x 350' with multiple standing rest breaks due to fatigue. Completes seated lower extremity exercises in room and tolerates well. O2 sats 96% or greater on RA throughout, HR mid 50s at rest and up to 78 bpm with activity today.      Recommend ARU once medically appropriate to improve strength, functional mobility and activity tolerance. Pt remains highly motivated to participate with therapy.

## 2017-08-03 NOTE — PLAN OF CARE
Problem: Goal Outcome Summary  Goal: Goal Outcome Summary  1. Pt will have fevers well controlled  2. Pt will be hemodynamically stable   Outcome: No Change  AVSS on room air. Pt denies pain or nausea, complains of feeling generally wiped out. Up in halls with PT this afternoon. Refused to get up to chair for dinner. TPA given in red lumen of PICC with good results.  and 292, given 7 units of novolog and 4 units at bedtime. K replacement scheduled q4hr, per pharmacy recommendation did not prn treat 3.7 KCl. 2200 KCl drawn, results pending. Lasix drip discontinued, given 40 mg of lasix this afternoon. Right upper arm has mild blanchable redness and hardened area noted. Redness noted to bilateral heels as well, feet elevated off of bed on pillows. Cryoprecipate given this evening for fibrinogen of 139. Tele in place, afib with HR 45-55. Huynh draining large amounts of clear yellow urine.

## 2017-08-03 NOTE — PROGRESS NOTES
Electrophysiology consult service was called to speak with Ms. Amelia Michel regarding Lifevest and ICD work-up as she is discharging tomorrow, 8/4.    Briefly, Ms. Michel is a 56 year-old female with a history of atrial fibrillation and out of hospital arrest 5/29 suspected secondary to AV prieto blocking agents discharged on Lifevest and subsequently re-admitted 6/19 with fever. She was diagnosed with new DLBCL and is now undergoing R-EPOCH therapy.     We discussed the risks and benefits of discharge on Lifevest and she stated that her preference was not to discharge with Lifevest, which is reasonable. However, she is still interested in considering ICD implantation in the future once her prognosis is clearer.  Our recommendation at this time is to discharge without Lifevest, but to follow-up in 2-3 months in clinic with Dr. Dickson or one of his colleagues to discuss ICD work-up and implantation. This will depend on the state of her Amiodarone extravasation wound, her prognosis post R-EPOCH, and her personal preference at that time. We will arrange follow-up.    Patient history and plan discussed with the attending cardiologist, Dr. Dickson.    Todd Noriega MD  Cardiology Fellow, PGY-4

## 2017-08-03 NOTE — PROGRESS NOTES
[facility], [place]    Hematology / Oncology Progress Note    Date of Service (when I saw the patient): 08/03/2017     Assessment & Plan   Amelia Michel is a 56 year old female who was admitted on 6/19/2017. She has complex past medical history of prior VSD repair, rheumatoid arthritis on long-term methotrexate, recent dx atrial fibrillation/flutter/SVT who was recently admitted to [facility] on 5/29 after an out of hospital cardiac arrest, admission complicated by intubation with discharge to TCU. Was discharged to TCU and subsequently readmitted to Cardiology service for FUO. With workup of fever and progressive cytopenias, diagnosis of DLBCL was made. She was transferred to the Hematology service on 7/26. See discussions of diagnosis and reasoning for treatment plan in 7/27 Heme/Onc note. Started Cycle 1 R-EPOCH on 7/28.     HEME:    #DLBCL. Stage 4 with bone marrow and liver involvement. IPI 4. Possibly related to prior methotrexate.   - PET/CT 7/18 revealing for mediastinal and neck level 2A lymphadenopathy, extensive FDG-avid bony lesions, hepatosplenic FDG-avid lesions, pelvic lesions contingous with the uterus, and bilateral pleural effusions.    - Bone marrow biopsy 7/12: scattered plasma cells in perivascular clusters, with no definitive staining of B-cell population by CD5; on re-review, possibly consistent with intravascularge large B-cell lymphoma. However, liver biopsy (7/21) consistent with DLBCL, negative for BCL2/BCL6/MYC rearrangements, CD20 positive.   - PICC line placed  - Completed Cycle 1 of dose adjusted R-EPOCH (Day 1=7/28/17). Tolerated well overall, except for reaction to Rituxan (hypotension with BP 87/45, SOB/feeling of ?throat swelling/difficulty getting breath in; then fever/rigors). Today is Day 7.  -Continue daily Neupogen (starting D6, 8/2)    #Hyperuricemia  #Monitor for TLS.  Uric acid up to 8.2 (7/27 PM), s/p Rasburicase. Uric acid improved to 4.2.  Continue Allopurinol. Daily labs given recent stability. Back off on frequency of Mg/Phos/uric acid when appropriate after chemotherapy.     #DIC. INR prolonged, now improving. Fibrinogen currently ok.   - conditional transfusion parameters in place: 2U plasma for INR >1.8, 5U cryo for fibrinogen <150.   - started Vitamin K 10mg PO x 3 days (7/28-7/30)  - adjust coag monitoring to qMWF x 4 occurrences; adjust if needed    #Pancytopenia, secondary to DLBCL  - transfuse to maintain Hgb >8.0 (keep given recent cardiac history), platelets >10,000    ID:   #PPX  - continue ppx ACV, Levaquin, Fluconazole     #Persistent Fevers. -- Resolved.  #Lactic acidosis. -- Resolved.  Previously followed by ID, who signed off 7/21.  Extensive infectious evaluation including blood cultures, which remain NGTD. Additionally, stool virus panel, M. Tuberculsosis, tick-borne illnesses, and bartonella/Q-fever have all been negative. Fevers/lactate secondary to malignancy.   - possible pneumonia while recently on meropenem (7/1-7/5);  CT chest (7/13) did reveal a right lung tree-in-bud opacities with more consolidative opacities in RLL; but no accompanying focal symptoms. Appears to have received dose of Meropenem on 7/14, now off.   - lactic acid increased to 10.4 (7/28) during Rituxan infusion reaction.   - cultures have been NGTD, avoid additional fluids given hypervolemia, monitor closely    CV/PV:    #Atrial fibrillation/flutter   #Recent out of hospital cardiac arrest (5/29/17)  #Prior VSD repair (1969)  - PET scan suggested FDG-avid lesions leading to thickening of the interatrial septum, extending superiorly along the main pulmonary artery; although cardiac MRI 7/24 shows no evidence of infiltrative disease    - Appreciate Cardiology team recs  - continue Digoxin 125mcg daily. Digoxin level within range on 7/30 AM.   - continue Atenolol 25mg daily (decreased 7/30). Note, per pt/, metoprolol has not worked for her and makes her  feel poorly.  PT also notes that she does NOT want amiodarone, as there is concern that this led to her cardiac arrest (and I will look further into this).   - continuous telemetry  - Heart rhythms appear to have stabilized with normalization of electrolytes. Will continue tele for now.    #Bradycardia. HRs 39-50s (7/28-7/29). On tele strips and formal EKG 7/29 this appeared to be sinus shelton. D/w Cards team who agree with decreasing atenolol dose. Checked dig level as above. Cardiology team continues to follow.      #Aortic masses. BRE on 7/14 showed 3 small masses on the coronary cusps and LVOT. Do not believe this to be endocarditis; no antibiotics at this time. Unclear what this represents; possibly could be Libmann-Sacks.      #Anasarca, volume overload  - Torsemide 40 mg BID with additional PRN. Per Cards (7/28): changed diuretic to Lasix gtt at 5mg/hr given fluids with CIVI chemo (which is generally around 70cc/hr). This may also help with additional diuresis with increased blood product needs.   - Lasix gtt stopped 8/2 and transitioned to IV pushes. Transitioned to oral Lasix 40mg BID today (decreased dose as she is not getting as much fluids with chemo anymore). Will monitor I&Os today and overnight. Plan to d/c on Lasix 40mg BID vs daily based on I&Os.   - Midodrine to aid in renal perfusion -- will continue to hold midodrine while her blood pressures are elevated. May resume when BPs normalize.  - 2g Na limit  - Continue spironolactone 12.5mg     GI:   #Nausea, mild.   - will now have scheduled antiemetic (Zofran, Emend)) with chemo, PRN Compazine/Ativan    #LFT derangement. 2/2 lymphoma involvement. ALT/AST significantly improved from prior. Alk phos more elevated today, but will monitor for fluctuations. Tbili has been elevated (chemo adjusted), now downtrending.   - monitor daily through chemo, then adjust frequency as indicated    #Constipation vs. diarrhea. Stools harder on 7/28. With scheduled  laxatives, pt developed looser stools. Backed off on scheduled bowel regimen. Stools now soft.     NEURO:  #Saddle anesthesia, urinary incontinence. Pt reports this is baseline for her. Unclear etiology. Was initially seen in ED and evaluated by NSG in 03/2016 for this without clear explanation for symptoms. She has been noted to have an S1 radiculopathy. Seen by Neuro in 06/2017, formal consult placed while here to follow-up. Suggested could do urodynamic study on outpatient basis, also recommended looking for disease in CSF, which is also part of chemo plan.    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 HOLDING PTA Prednisone with plan for higher dose steroids on treatment plan. Resume 5mg dose as of 8/4.   - Follow-up with [facility] clinic Dr. Conor Cunha in 4-6 weeks after discharge    ENDO:  #Steroid induced hyperglycemia. Added QID BG check and med SSI.     DERM:   #Extravasation-related LUE wound. Slowly improving.   - WOCN following, appreciate recs    GEN:  #Deconditioning. 2/2 prolonged hospital stays, acute illness/malignancy  - PT/OT.   - PM&R eval completed. Ok for d/c to ARU once medically ready.     FEN  - 2g Na diet  - Dietician following    #Hypokalemia. 2/2 lasix. Started PO supplement in addition to sliding scale (currently high given cardiac issues/acute illness).   - K+ on PM check yesterday was 4.6, therefore scheduled IV K+ replacement of 20mEq q4h was stopped.   - Will give K+ 40mEq IV this morning (K+ 4.2 on AM labs). On PM check will give additional K+ if needed.  - Continue high sliding scale replacement, only IV.   - Continue BID BMP lab checks.     PPx:   - VTE: defer VTE pharmacologic ppx due to thrombocytopenia  -  GI: continue protonix    CODE: Full Code    Disposition: Inpatient hospitalization until electrolyte and diuresis regimen stabilized. Will d/c to ARU.      Shyann Marinelli PA-C  Hematology/Oncology  Pager: [phone]    Interval History   Amelia is feeling well today. She has already gotten washed up and is breakfast. She says she is feeling stronger everyday. Has not noticed major issues since ending chemo. Discussed plan to d/c to ARU and timing of next cycle of chemo. Amelia denies chest pain, SOB, n/v/d/c.    Physical Exam   Temp: 96.8  F (36  C) Temp src: Axillary BP: 150/83 Pulse: 54 Heart Rate: 58 Resp: 14 SpO2: 97 % O2 Device: None (Room air)    Vitals:    08/01/17 0741 08/02/17 0719 08/03/17 0834   Weight: 67.4 kg (148 lb 9.6 oz) 66.5 kg (146 lb 8 oz) 65.6 kg (144 lb 11.2 oz)     Vital Signs with Ranges  Temp:  [96.5  F (35.8  C)-98.5  F (36.9  C)] 96.8  F (36  C)  Pulse:  [51-54] 54  Heart Rate:  [55-64] 58  Resp:  [12-20] 14  BP: (133-151)/(68-83) 150/83  SpO2:  [96 %-97 %] 97 %  I/O last 3 completed shifts:  In: 2050 [P.O.:1280; I.V.:670]  Out: 4100 [Urine:4100]  Constitutional: Pleasant and cooperative female seen sitting up in a chair and eating breakfast. Awake, alert, NAD.  HEENT: NC/AT, EOMI, sclera clear, conjunctiva normal, OP with MMM, lower gumline lesion is improved, hardly visible  Respiratory: No increased work of breathing, CTAB, no crackles or wheezing.  Cardiovascular: Regular rhythm, rate controlled, no murmur noted. 1+ pitting edema in hips, LE edema improved with compression stockings.  GI: Normal bowel sounds, soft, non-distended and non-tender.  Skin: Warm, dry, well-perfused. No bruising, bleeding, rashes, or lesions on limited exam.  Neurologic: A&O. Answers questions appropriately. Moves all extremities spontaneously.  Neuropsychiatric: Calm, appropriate affect    Medications     - MEDICATION INSTRUCTIONS -       NaCl       IV fluid REPLACEMENT ONLY       - MEDICATION  INSTRUCTIONS -         potassium chloride  20 mEq Intravenous Q1H     furosemide  40 mg Oral BID     spironolactone  12.5 mg Oral Daily     insulin aspart  1-10 Units Subcutaneous TID AC     insulin aspart  1-7 Units Subcutaneous At Bedtime     atenolol  25 mg Oral Daily     melatonin  1-3 mg Oral At Bedtime     allopurinol  300 mg Oral Daily     acyclovir  400 mg Oral BID     fluconazole  200 mg Oral Daily     filgrastim (NEUPOGEN/GRANIX) intravenous  5 mcg/kg (Treatment Plan Recorded) Intravenous Daily at 8 pm     sodium chloride (PF)  10 mL Intracatheter Q7 Days     [START ON 8/4/2017] predniSONE  5 mg Oral Daily     morphine 0.1% in solosite  2 g Transdermal Daily     sodium chloride (PF)  1-74 mL Intracatheter Q8H     vitamin  B-1  50 mg Oral Daily     folic acid  1 mg Oral Daily     miconazole   Topical BID     pantoprazole  40 mg Oral QAM     digoxin  125 mcg Oral Daily     sodium chloride (PF)  10 mL Intracatheter Q8H     gabapentin  200 mg Oral TID     multivitamin, therapeutic with minerals  1 tablet Oral Daily     calcium-vitamin D  2 tablet Oral Daily       Data   Results for orders placed or performed during the hospital encounter of 06/19/17 (from the past 24 hour(s))   Basic metabolic panel   Result Value Ref Range    Sodium 134 133 - 144 mmol/L    Potassium 3.7 3.4 - 5.3 mmol/L    Chloride 92 (L) 94 - 109 mmol/L    Carbon Dioxide 34 (H) 20 - 32 mmol/L    Anion Gap 8 3 - 14 mmol/L    Glucose 297 (H) 70 - 99 mg/dL    Urea Nitrogen 30 7 - 30 mg/dL    Creatinine 0.60 0.52 - 1.04 mg/dL    GFR Estimate >90  Non  GFR Calc   >60 mL/min/1.7m2    GFR Estimate If Black >90   GFR Calc   >60 mL/min/1.7m2    Calcium 8.3 (L) 8.5 - 10.1 mg/dL   Magnesium   Result Value Ref Range    Magnesium 2.3 1.6 - 2.3 mg/dL   Cryoprecipitate prepare order unit conditional   Result Value Ref Range    Ordered Component Type Cryoprecipitate     Units Ordered 5    Blood component   Result Value Ref  Range    Unit Number [id number]     Blood Component Type 5 cryoprecipitate units pooled     Division Number 00     Status of Unit Released to care unit     Blood Product Code [id number]     Unit Status ISS    Glucose by meter   Result Value Ref Range    Glucose 289 (H) 70 - 99 mg/dL   Glucose by meter   Result Value Ref Range    Glucose 291 (H) 70 - 99 mg/dL   Potassium   Result Value Ref Range    Potassium 4.6 3.4 - 5.3 mmol/L   Glucose by meter   Result Value Ref Range    Glucose 292 (H) 70 - 99 mg/dL   Basic metabolic panel   Result Value Ref Range    Sodium 135 133 - 144 mmol/L    Potassium 4.2 3.4 - 5.3 mmol/L    Chloride 94 94 - 109 mmol/L    Carbon Dioxide 36 (H) 20 - 32 mmol/L    Anion Gap 5 3 - 14 mmol/L    Glucose 199 (H) 70 - 99 mg/dL    Urea Nitrogen 30 7 - 30 mg/dL    Creatinine 0.54 0.52 - 1.04 mg/dL    GFR Estimate >90  Non  GFR Calc   >60 mL/min/1.7m2    GFR Estimate If Black >90   GFR Calc   >60 mL/min/1.7m2    Calcium 8.2 (L) 8.5 - 10.1 mg/dL   CBC with platelets differential   Result Value Ref Range    WBC 3.0 (L) 4.0 - 11.0 10e9/L    RBC Count 2.85 (L) 3.8 - 5.2 10e12/L    Hemoglobin 8.6 (L) 11.7 - 15.7 g/dL    Hematocrit 26.1 (L) 35.0 - 47.0 %    MCV 92 78 - 100 fl    MCH 30.2 26.5 - 33.0 pg    MCHC 33.0 31.5 - 36.5 g/dL    RDW 25.4 (H) 10.0 - 15.0 %    Platelet Count 19 (LL) 150 - 450 10e9/L    Diff Method Manual Differential     % Neutrophils 93.9 %    % Lymphocytes 6.1 %    % Monocytes 0.0 %    % Eosinophils 0.0 %    % Basophils 0.0 %    Absolute Neutrophil 2.8 1.6 - 8.3 10e9/L    Absolute Lymphocytes 0.2 (L) 0.8 - 5.3 10e9/L    Absolute Monocytes 0.0 0.0 - 1.3 10e9/L    Absolute Eosinophils 0.0 0.0 - 0.7 10e9/L    Absolute Basophils 0.0 0.0 - 0.2 10e9/L    Anisocytosis Marked     Poikilocytosis Slight     RBC Fragments Slight     Stomatocytes Slight     Platelet Estimate Confirming automated cell count    Lactate Dehydrogenase   Result Value Ref Range     Lactate Dehydrogenase 293 (H) 81 - 234 U/L   Magnesium   Result Value Ref Range    Magnesium 2.4 (H) 1.6 - 2.3 mg/dL   Phosphorus   Result Value Ref Range    Phosphorus 2.3 (L) 2.5 - 4.5 mg/dL   Hepatic panel   Result Value Ref Range    Bilirubin Direct 0.6 (H) 0.0 - 0.2 mg/dL    Bilirubin Total 1.2 0.2 - 1.3 mg/dL    Albumin 2.9 (L) 3.4 - 5.0 g/dL    Protein Total 5.7 (L) 6.8 - 8.8 g/dL    Alkaline Phosphatase 234 (H) 40 - 150 U/L    ALT 91 (H) 0 - 50 U/L    AST 69 (H) 0 - 45 U/L

## 2017-08-04 ENCOUNTER — HOSPITAL ENCOUNTER (INPATIENT)
Facility: CLINIC | Age: 57
LOS: 4 days | Discharge: ANOTHER HOSPITAL | DRG: 840 | End: 2017-08-08
Attending: PHYSICAL MEDICINE & REHABILITATION | Admitting: PHYSICAL MEDICINE & REHABILITATION
Payer: COMMERCIAL

## 2017-08-04 VITALS
HEART RATE: 54 BPM | RESPIRATION RATE: 16 BRPM | WEIGHT: 145.6 LBS | SYSTOLIC BLOOD PRESSURE: 158 MMHG | TEMPERATURE: 97.6 F | OXYGEN SATURATION: 95 % | DIASTOLIC BLOOD PRESSURE: 75 MMHG | HEIGHT: 57 IN | BODY MASS INDEX: 31.41 KG/M2

## 2017-08-04 DIAGNOSIS — R78.81 GRAM-POSITIVE BACTEREMIA: Primary | ICD-10-CM

## 2017-08-04 DIAGNOSIS — C83.398 DIFFUSE LARGE B-CELL LYMPHOMA OF EXTRANODAL SITE: ICD-10-CM

## 2017-08-04 DIAGNOSIS — R60.1 GENERALIZED EDEMA: ICD-10-CM

## 2017-08-04 PROBLEM — C83.30 DIFFUSE LARGE B CELL LYMPHOMA (H): Status: ACTIVE | Noted: 2017-08-04

## 2017-08-04 LAB
ALBUMIN SERPL-MCNC: 3.1 G/DL (ref 3.4–5)
ALP SERPL-CCNC: 232 U/L (ref 40–150)
ALT SERPL W P-5'-P-CCNC: 108 U/L (ref 0–50)
ANION GAP SERPL CALCULATED.3IONS-SCNC: 6 MMOL/L (ref 3–14)
ANION GAP SERPL CALCULATED.3IONS-SCNC: 8 MMOL/L (ref 3–14)
ANISOCYTOSIS BLD QL SMEAR: ABNORMAL
AST SERPL W P-5'-P-CCNC: 75 U/L (ref 0–45)
BASOPHILS # BLD AUTO: 0 10E9/L (ref 0–0.2)
BASOPHILS NFR BLD AUTO: 0 %
BILIRUB DIRECT SERPL-MCNC: 0.6 MG/DL (ref 0–0.2)
BILIRUB SERPL-MCNC: 1.2 MG/DL (ref 0.2–1.3)
BLD PROD TYP BPU: NORMAL
BLD PROD TYP BPU: NORMAL
BLD UNIT ID BPU: 0
BLOOD PRODUCT CODE: NORMAL
BPU ID: NORMAL
BUN SERPL-MCNC: 34 MG/DL (ref 7–30)
BUN SERPL-MCNC: 34 MG/DL (ref 7–30)
CALCIUM SERPL-MCNC: 8.4 MG/DL (ref 8.5–10.1)
CALCIUM SERPL-MCNC: 8.5 MG/DL (ref 8.5–10.1)
CHLORIDE SERPL-SCNC: 89 MMOL/L (ref 94–109)
CHLORIDE SERPL-SCNC: 90 MMOL/L (ref 94–109)
CO2 SERPL-SCNC: 33 MMOL/L (ref 20–32)
CO2 SERPL-SCNC: 36 MMOL/L (ref 20–32)
CREAT SERPL-MCNC: 0.51 MG/DL (ref 0.52–1.04)
CREAT SERPL-MCNC: 0.53 MG/DL (ref 0.52–1.04)
DIFFERENTIAL METHOD BLD: ABNORMAL
EOSINOPHIL # BLD AUTO: 0 10E9/L (ref 0–0.7)
EOSINOPHIL NFR BLD AUTO: 0 %
ERYTHROCYTE [DISTWIDTH] IN BLOOD BY AUTOMATED COUNT: 24.7 % (ref 10–15)
FIBRINOGEN PPP-MCNC: 145 MG/DL (ref 200–420)
GFR SERPL CREATININE-BSD FRML MDRD: ABNORMAL ML/MIN/1.7M2
GFR SERPL CREATININE-BSD FRML MDRD: ABNORMAL ML/MIN/1.7M2
GLUCOSE BLDC GLUCOMTR-MCNC: 219 MG/DL (ref 70–99)
GLUCOSE BLDC GLUCOMTR-MCNC: 232 MG/DL (ref 70–99)
GLUCOSE SERPL-MCNC: 223 MG/DL (ref 70–99)
GLUCOSE SERPL-MCNC: 233 MG/DL (ref 70–99)
HBA1C MFR BLD: 6.3 % (ref 4.3–6)
HCT VFR BLD AUTO: 25.2 % (ref 35–47)
HGB BLD-MCNC: 8.1 G/DL (ref 11.7–15.7)
INR PPP: 1.42 (ref 0.86–1.14)
LDH SERPL L TO P-CCNC: 314 U/L (ref 81–234)
LYMPHOCYTES # BLD AUTO: 0.2 10E9/L (ref 0.8–5.3)
LYMPHOCYTES NFR BLD AUTO: 13.2 %
MACROCYTES BLD QL SMEAR: PRESENT
MAGNESIUM SERPL-MCNC: 2.2 MG/DL (ref 1.6–2.3)
MCH RBC QN AUTO: 29.5 PG (ref 26.5–33)
MCHC RBC AUTO-ENTMCNC: 32.1 G/DL (ref 31.5–36.5)
MCV RBC AUTO: 92 FL (ref 78–100)
MONOCYTES # BLD AUTO: 0 10E9/L (ref 0–1.3)
MONOCYTES NFR BLD AUTO: 0 %
NEUTROPHILS # BLD AUTO: 1.4 10E9/L (ref 1.6–8.3)
NEUTROPHILS NFR BLD AUTO: 86.8 %
NUM BPU REQUESTED: 5
OVALOCYTES BLD QL SMEAR: SLIGHT
PHOSPHATE SERPL-MCNC: 3.1 MG/DL (ref 2.5–4.5)
PLATELET # BLD AUTO: 15 10E9/L (ref 150–450)
POIKILOCYTOSIS BLD QL SMEAR: SLIGHT
POTASSIUM SERPL-SCNC: 3.8 MMOL/L (ref 3.4–5.3)
POTASSIUM SERPL-SCNC: 4.6 MMOL/L (ref 3.4–5.3)
PROT SERPL-MCNC: 5.9 G/DL (ref 6.8–8.8)
RBC # BLD AUTO: 2.75 10E12/L (ref 3.8–5.2)
SODIUM SERPL-SCNC: 131 MMOL/L (ref 133–144)
SODIUM SERPL-SCNC: 131 MMOL/L (ref 133–144)
TRANSFUSION STATUS PATIENT QL: NORMAL
TRANSFUSION STATUS PATIENT QL: NORMAL
URATE SERPL-MCNC: 3.5 MG/DL (ref 2.6–6)
WBC # BLD AUTO: 1.6 10E9/L (ref 4–11)

## 2017-08-04 PROCEDURE — P9012 CRYOPRECIPITATE EACH UNIT: HCPCS | Performed by: INTERNAL MEDICINE

## 2017-08-04 PROCEDURE — 85025 COMPLETE CBC W/AUTO DIFF WBC: CPT | Performed by: PHYSICIAN ASSISTANT

## 2017-08-04 PROCEDURE — 25000131 ZZH RX MED GY IP 250 OP 636 PS 637: Performed by: INTERNAL MEDICINE

## 2017-08-04 PROCEDURE — 25000125 ZZHC RX 250: Performed by: INTERNAL MEDICINE

## 2017-08-04 PROCEDURE — 40000344 ZZHCL STATISTIC THAWING COMPONENT: Performed by: INTERNAL MEDICINE

## 2017-08-04 PROCEDURE — 25000132 ZZH RX MED GY IP 250 OP 250 PS 637: Performed by: INTERNAL MEDICINE

## 2017-08-04 PROCEDURE — 12800006 ZZH R&B REHAB

## 2017-08-04 PROCEDURE — 83036 HEMOGLOBIN GLYCOSYLATED A1C: CPT | Performed by: PHYSICIAN ASSISTANT

## 2017-08-04 PROCEDURE — 25000128 H RX IP 250 OP 636: Performed by: INTERNAL MEDICINE

## 2017-08-04 PROCEDURE — 36592 COLLECT BLOOD FROM PICC: CPT | Performed by: INTERNAL MEDICINE

## 2017-08-04 PROCEDURE — 25000132 ZZH RX MED GY IP 250 OP 250 PS 637: Performed by: NURSE PRACTITIONER

## 2017-08-04 PROCEDURE — 25000128 H RX IP 250 OP 636

## 2017-08-04 PROCEDURE — 84100 ASSAY OF PHOSPHORUS: CPT | Performed by: PHYSICIAN ASSISTANT

## 2017-08-04 PROCEDURE — 84550 ASSAY OF BLOOD/URIC ACID: CPT | Performed by: PHYSICIAN ASSISTANT

## 2017-08-04 PROCEDURE — 93005 ELECTROCARDIOGRAM TRACING: CPT

## 2017-08-04 PROCEDURE — 25000132 ZZH RX MED GY IP 250 OP 250 PS 637: Performed by: STUDENT IN AN ORGANIZED HEALTH CARE EDUCATION/TRAINING PROGRAM

## 2017-08-04 PROCEDURE — 85384 FIBRINOGEN ACTIVITY: CPT | Performed by: PHYSICIAN ASSISTANT

## 2017-08-04 PROCEDURE — 93010 ELECTROCARDIOGRAM REPORT: CPT | Performed by: INTERNAL MEDICINE

## 2017-08-04 PROCEDURE — 80076 HEPATIC FUNCTION PANEL: CPT | Performed by: PHYSICIAN ASSISTANT

## 2017-08-04 PROCEDURE — 25000132 ZZH RX MED GY IP 250 OP 250 PS 637

## 2017-08-04 PROCEDURE — 85610 PROTHROMBIN TIME: CPT | Performed by: PHYSICIAN ASSISTANT

## 2017-08-04 PROCEDURE — 00000146 ZZHCL STATISTIC GLUCOSE BY METER IP

## 2017-08-04 PROCEDURE — 83615 LACTATE (LD) (LDH) ENZYME: CPT | Performed by: PHYSICIAN ASSISTANT

## 2017-08-04 PROCEDURE — 99223 1ST HOSP IP/OBS HIGH 75: CPT | Performed by: INTERNAL MEDICINE

## 2017-08-04 PROCEDURE — 99207 ZZC CDG-MDM COMPONENT: MEETS MODERATE - UP CODED: CPT | Performed by: INTERNAL MEDICINE

## 2017-08-04 PROCEDURE — 83735 ASSAY OF MAGNESIUM: CPT | Performed by: PHYSICIAN ASSISTANT

## 2017-08-04 PROCEDURE — 25000128 H RX IP 250 OP 636: Performed by: PHYSICAL MEDICINE & REHABILITATION

## 2017-08-04 PROCEDURE — 80048 BASIC METABOLIC PNL TOTAL CA: CPT | Performed by: INTERNAL MEDICINE

## 2017-08-04 PROCEDURE — 25000132 ZZH RX MED GY IP 250 OP 250 PS 637: Performed by: PHYSICIAN ASSISTANT

## 2017-08-04 PROCEDURE — 80048 BASIC METABOLIC PNL TOTAL CA: CPT | Performed by: PHYSICIAN ASSISTANT

## 2017-08-04 PROCEDURE — 99239 HOSP IP/OBS DSCHRG MGMT >30: CPT | Performed by: INTERNAL MEDICINE

## 2017-08-04 RX ORDER — AMOXICILLIN 250 MG
1-2 CAPSULE ORAL 2 TIMES DAILY
Status: DISCONTINUED | OUTPATIENT
Start: 2017-08-04 | End: 2017-08-05

## 2017-08-04 RX ORDER — CETIRIZINE HYDROCHLORIDE 10 MG/1
10 TABLET ORAL DAILY PRN
Qty: 30 TABLET | Status: ON HOLD | DISCHARGE
Start: 2017-08-04 | End: 2017-08-10

## 2017-08-04 RX ORDER — ATENOLOL 50 MG/1
25 TABLET ORAL DAILY
Qty: 30 TABLET | Status: ON HOLD | DISCHARGE
Start: 2017-08-04 | End: 2017-08-11

## 2017-08-04 RX ORDER — POTASSIUM CHLORIDE 750 MG/1
20-40 TABLET, EXTENDED RELEASE ORAL
Status: DISCONTINUED | OUTPATIENT
Start: 2017-08-04 | End: 2017-08-08 | Stop reason: HOSPADM

## 2017-08-04 RX ORDER — HEPARIN SODIUM,PORCINE 10 UNIT/ML
5-10 VIAL (ML) INTRAVENOUS EVERY 24 HOURS
Status: DISCONTINUED | OUTPATIENT
Start: 2017-08-04 | End: 2017-08-08 | Stop reason: HOSPADM

## 2017-08-04 RX ORDER — FUROSEMIDE 40 MG
40 TABLET ORAL 2 TIMES DAILY
Qty: 30 TABLET | Status: ON HOLD | DISCHARGE
Start: 2017-08-04 | End: 2017-08-08

## 2017-08-04 RX ORDER — MAGNESIUM SULFATE HEPTAHYDRATE 40 MG/ML
4 INJECTION, SOLUTION INTRAVENOUS EVERY 4 HOURS PRN
Status: DISCONTINUED | OUTPATIENT
Start: 2017-08-04 | End: 2017-08-05

## 2017-08-04 RX ORDER — PREDNISONE 5 MG/1
5 TABLET ORAL DAILY
Status: DISCONTINUED | OUTPATIENT
Start: 2017-08-05 | End: 2017-08-08 | Stop reason: HOSPADM

## 2017-08-04 RX ORDER — ASCORBIC ACID 500 MG
500 TABLET ORAL DAILY
Qty: 30 TABLET | Status: ON HOLD | DISCHARGE
Start: 2017-08-04 | End: 2017-08-10

## 2017-08-04 RX ORDER — ACYCLOVIR 400 MG/1
400 TABLET ORAL 2 TIMES DAILY
Qty: 30 TABLET | Status: ON HOLD | DISCHARGE
Start: 2017-08-04 | End: 2017-08-11

## 2017-08-04 RX ORDER — DIGOXIN 250 MCG
125 TABLET ORAL DAILY
Qty: 30 TABLET | Status: ON HOLD | DISCHARGE
Start: 2017-08-04 | End: 2017-08-11

## 2017-08-04 RX ORDER — FOLIC ACID 1 MG/1
1 TABLET ORAL DAILY
Qty: 30 TABLET | Status: ON HOLD | DISCHARGE
Start: 2017-08-04 | End: 2017-08-11

## 2017-08-04 RX ORDER — ASCORBIC ACID 500 MG
500 TABLET ORAL DAILY
Status: DISCONTINUED | OUTPATIENT
Start: 2017-08-04 | End: 2017-08-04

## 2017-08-04 RX ORDER — ZINC SULFATE 50(220)MG
220 CAPSULE ORAL DAILY
Status: DISCONTINUED | OUTPATIENT
Start: 2017-08-04 | End: 2017-08-04

## 2017-08-04 RX ORDER — NALOXONE HYDROCHLORIDE 0.4 MG/ML
.1-.4 INJECTION, SOLUTION INTRAMUSCULAR; INTRAVENOUS; SUBCUTANEOUS
Status: DISCONTINUED | OUTPATIENT
Start: 2017-08-04 | End: 2017-08-08 | Stop reason: HOSPADM

## 2017-08-04 RX ORDER — DIGOXIN 125 MCG
125 TABLET ORAL DAILY
Status: DISCONTINUED | OUTPATIENT
Start: 2017-08-05 | End: 2017-08-08 | Stop reason: HOSPADM

## 2017-08-04 RX ORDER — ALLOPURINOL 300 MG/1
300 TABLET ORAL DAILY
Status: DISCONTINUED | OUTPATIENT
Start: 2017-08-05 | End: 2017-08-08 | Stop reason: HOSPADM

## 2017-08-04 RX ORDER — ATENOLOL 25 MG/1
25 TABLET ORAL DAILY
Status: DISCONTINUED | OUTPATIENT
Start: 2017-08-05 | End: 2017-08-08 | Stop reason: HOSPADM

## 2017-08-04 RX ORDER — ZINC SULFATE 50(220)MG
220 CAPSULE ORAL DAILY
Status: ON HOLD | DISCHARGE
Start: 2017-08-04 | End: 2017-08-10

## 2017-08-04 RX ORDER — MULTIPLE VITAMINS W/ MINERALS TAB 9MG-400MCG
1 TAB ORAL DAILY
Qty: 30 EACH | Refills: 0 | Status: ON HOLD | DISCHARGE
Start: 2017-08-04 | End: 2017-08-11

## 2017-08-04 RX ORDER — SPIRONOLACTONE 25 MG
12.5 TABLET ORAL DAILY
Status: DISCONTINUED | OUTPATIENT
Start: 2017-08-05 | End: 2017-08-07

## 2017-08-04 RX ORDER — POTASSIUM CHLORIDE 7.45 MG/ML
10 INJECTION INTRAVENOUS
Status: DISCONTINUED | OUTPATIENT
Start: 2017-08-04 | End: 2017-08-08 | Stop reason: HOSPADM

## 2017-08-04 RX ORDER — POTASSIUM CL/LIDO/0.9 % NACL 10MEQ/0.1L
10 INTRAVENOUS SOLUTION, PIGGYBACK (ML) INTRAVENOUS
Status: DISCONTINUED | OUTPATIENT
Start: 2017-08-04 | End: 2017-08-08 | Stop reason: HOSPADM

## 2017-08-04 RX ORDER — ACETAMINOPHEN 325 MG/1
650 TABLET ORAL EVERY 4 HOURS PRN
Qty: 100 TABLET | Status: ON HOLD | DISCHARGE
Start: 2017-08-04 | End: 2017-08-11

## 2017-08-04 RX ORDER — CETIRIZINE HYDROCHLORIDE 10 MG/1
10 TABLET ORAL DAILY PRN
Status: DISCONTINUED | OUTPATIENT
Start: 2017-08-04 | End: 2017-08-08 | Stop reason: HOSPADM

## 2017-08-04 RX ORDER — ACYCLOVIR 400 MG/1
400 TABLET ORAL 2 TIMES DAILY
Status: DISCONTINUED | OUTPATIENT
Start: 2017-08-04 | End: 2017-08-08 | Stop reason: HOSPADM

## 2017-08-04 RX ORDER — MULTIPLE VITAMINS W/ MINERALS TAB 9MG-400MCG
1 TAB ORAL DAILY
Status: DISCONTINUED | OUTPATIENT
Start: 2017-08-05 | End: 2017-08-08 | Stop reason: HOSPADM

## 2017-08-04 RX ORDER — BETA-CAROTENE 7500 MCG
25000 CAPSULE ORAL DAILY
Status: DISCONTINUED | OUTPATIENT
Start: 2017-08-04 | End: 2017-08-04

## 2017-08-04 RX ORDER — NICOTINE POLACRILEX 4 MG
15-30 LOZENGE BUCCAL
Status: DISCONTINUED | OUTPATIENT
Start: 2017-08-04 | End: 2017-08-08 | Stop reason: HOSPADM

## 2017-08-04 RX ORDER — PREDNISONE 5 MG/1
5 TABLET ORAL DAILY
Status: ON HOLD | DISCHARGE
Start: 2017-08-04 | End: 2017-08-11

## 2017-08-04 RX ORDER — POTASSIUM CHLORIDE 1.5 G/1.58G
20-40 POWDER, FOR SOLUTION ORAL
Status: DISCONTINUED | OUTPATIENT
Start: 2017-08-04 | End: 2017-08-08 | Stop reason: HOSPADM

## 2017-08-04 RX ORDER — ALLOPURINOL 300 MG/1
300 TABLET ORAL DAILY
Qty: 30 TABLET | Status: ON HOLD | DISCHARGE
Start: 2017-08-04 | End: 2017-08-11

## 2017-08-04 RX ORDER — POTASSIUM CHLORIDE 750 MG/1
40 TABLET, EXTENDED RELEASE ORAL DAILY
Status: DISCONTINUED | OUTPATIENT
Start: 2017-08-04 | End: 2017-08-08 | Stop reason: HOSPADM

## 2017-08-04 RX ORDER — POTASSIUM CHLORIDE 29.8 MG/ML
20 INJECTION INTRAVENOUS
Status: DISCONTINUED | OUTPATIENT
Start: 2017-08-04 | End: 2017-08-08 | Stop reason: HOSPADM

## 2017-08-04 RX ORDER — HEPARIN SODIUM,PORCINE 10 UNIT/ML
5-10 VIAL (ML) INTRAVENOUS
Status: DISCONTINUED | OUTPATIENT
Start: 2017-08-04 | End: 2017-08-08 | Stop reason: HOSPADM

## 2017-08-04 RX ORDER — SPIRONOLACTONE 25 MG/1
12.5 TABLET ORAL DAILY
Qty: 30 TABLET | Status: ON HOLD | DISCHARGE
Start: 2017-08-04 | End: 2017-08-08

## 2017-08-04 RX ORDER — TRAMADOL HYDROCHLORIDE 50 MG/1
25 TABLET ORAL EVERY 6 HOURS PRN
Qty: 28 TABLET | Status: ON HOLD | DISCHARGE
Start: 2017-08-04 | End: 2017-08-11

## 2017-08-04 RX ORDER — BETA-CAROTENE 7500 MCG
25000 CAPSULE ORAL DAILY
Status: ON HOLD | DISCHARGE
Start: 2017-08-04 | End: 2017-08-08

## 2017-08-04 RX ORDER — ACETAMINOPHEN 325 MG/1
650 TABLET ORAL EVERY 4 HOURS PRN
Status: DISCONTINUED | OUTPATIENT
Start: 2017-08-04 | End: 2017-08-08 | Stop reason: HOSPADM

## 2017-08-04 RX ORDER — GABAPENTIN 100 MG/1
200 CAPSULE ORAL 3 TIMES DAILY
Qty: 180 CAPSULE | Refills: 3 | Status: ON HOLD | DISCHARGE
Start: 2017-08-04 | End: 2017-08-11

## 2017-08-04 RX ORDER — FOLIC ACID 1 MG/1
1 TABLET ORAL DAILY
Status: DISCONTINUED | OUTPATIENT
Start: 2017-08-05 | End: 2017-08-08 | Stop reason: HOSPADM

## 2017-08-04 RX ORDER — GABAPENTIN 100 MG/1
200 CAPSULE ORAL 3 TIMES DAILY
Status: DISCONTINUED | OUTPATIENT
Start: 2017-08-04 | End: 2017-08-08 | Stop reason: HOSPADM

## 2017-08-04 RX ORDER — FLUCONAZOLE 200 MG/1
200 TABLET ORAL DAILY
Qty: 30 TABLET | Status: ON HOLD | DISCHARGE
Start: 2017-08-04 | End: 2017-08-11

## 2017-08-04 RX ORDER — FLUCONAZOLE 200 MG/1
200 TABLET ORAL DAILY
Status: DISCONTINUED | OUTPATIENT
Start: 2017-08-05 | End: 2017-08-08 | Stop reason: HOSPADM

## 2017-08-04 RX ORDER — FUROSEMIDE 40 MG
40 TABLET ORAL 2 TIMES DAILY
Status: DISCONTINUED | OUTPATIENT
Start: 2017-08-04 | End: 2017-08-05

## 2017-08-04 RX ORDER — DEXTROSE MONOHYDRATE 25 G/50ML
25-50 INJECTION, SOLUTION INTRAVENOUS
Status: DISCONTINUED | OUTPATIENT
Start: 2017-08-04 | End: 2017-08-08 | Stop reason: HOSPADM

## 2017-08-04 RX ORDER — POTASSIUM CHLORIDE 1500 MG/1
40 TABLET, EXTENDED RELEASE ORAL DAILY
Qty: 90 TABLET | Status: ON HOLD | DISCHARGE
Start: 2017-08-04 | End: 2017-08-11

## 2017-08-04 RX ORDER — AMOXICILLIN 250 MG
1-2 CAPSULE ORAL 2 TIMES DAILY
Qty: 100 TABLET | Status: ON HOLD | DISCHARGE
Start: 2017-08-04 | End: 2017-08-11

## 2017-08-04 RX ADMIN — GABAPENTIN 200 MG: 100 CAPSULE ORAL at 13:46

## 2017-08-04 RX ADMIN — SENNOSIDES AND DOCUSATE SODIUM 2 TABLET: 8.6; 5 TABLET ORAL at 13:46

## 2017-08-04 RX ADMIN — POTASSIUM CHLORIDE 20 MEQ: 29.8 INJECTION, SOLUTION INTRAVENOUS at 04:01

## 2017-08-04 RX ADMIN — ALLOPURINOL 300 MG: 300 TABLET ORAL at 08:16

## 2017-08-04 RX ADMIN — Medication 300 MCG: at 21:12

## 2017-08-04 RX ADMIN — ATENOLOL 25 MG: 25 TABLET ORAL at 08:15

## 2017-08-04 RX ADMIN — FLUCONAZOLE 200 MG: 200 TABLET ORAL at 08:16

## 2017-08-04 RX ADMIN — ACYCLOVIR 400 MG: 400 TABLET ORAL at 20:44

## 2017-08-04 RX ADMIN — POTASSIUM CHLORIDE 40 MEQ: 750 TABLET, FILM COATED, EXTENDED RELEASE ORAL at 13:46

## 2017-08-04 RX ADMIN — ACYCLOVIR 400 MG: 400 TABLET ORAL at 08:16

## 2017-08-04 RX ADMIN — THIAMINE HCL (VITAMIN B1) 50 MG TABLET 50 MG: at 08:15

## 2017-08-04 RX ADMIN — DIGOXIN 125 MCG: 125 TABLET ORAL at 08:15

## 2017-08-04 RX ADMIN — SODIUM CHLORIDE, PRESERVATIVE FREE 5 ML: 5 INJECTION INTRAVENOUS at 10:41

## 2017-08-04 RX ADMIN — FUROSEMIDE 40 MG: 40 TABLET ORAL at 20:44

## 2017-08-04 RX ADMIN — INSULIN ASPART 2 UNITS: 100 INJECTION, SOLUTION INTRAVENOUS; SUBCUTANEOUS at 14:15

## 2017-08-04 RX ADMIN — MULTIPLE VITAMINS W/ MINERALS TAB 1 TABLET: TAB at 08:16

## 2017-08-04 RX ADMIN — PREDNISONE 5 MG: 5 TABLET ORAL at 08:15

## 2017-08-04 RX ADMIN — INSULIN ASPART 2 UNITS: 100 INJECTION, SOLUTION INTRAVENOUS; SUBCUTANEOUS at 18:15

## 2017-08-04 RX ADMIN — FUROSEMIDE 40 MG: 20 TABLET ORAL at 08:16

## 2017-08-04 RX ADMIN — PANTOPRAZOLE SODIUM 40 MG: 40 TABLET, DELAYED RELEASE ORAL at 05:47

## 2017-08-04 RX ADMIN — Medication 12.5 MG: at 08:16

## 2017-08-04 RX ADMIN — FOLIC ACID 1 MG: 1 TABLET ORAL at 08:16

## 2017-08-04 RX ADMIN — CALCIUM CARBONATE 600 MG (1,500 MG)-VITAMIN D3 400 UNIT TABLET 2 TABLET: at 08:15

## 2017-08-04 RX ADMIN — GABAPENTIN 200 MG: 100 CAPSULE ORAL at 20:44

## 2017-08-04 RX ADMIN — SODIUM CHLORIDE, PRESERVATIVE FREE 10 ML: 5 INJECTION INTRAVENOUS at 20:45

## 2017-08-04 RX ADMIN — GABAPENTIN 200 MG: 100 CAPSULE ORAL at 08:16

## 2017-08-04 NOTE — PLAN OF CARE
Problem: Goal Outcome Summary  Goal: Goal Outcome Summary  1. Pt will have fevers well controlled  2. Pt will be hemodynamically stable   PT/5C: Attempted to see pt this AM however pt leaving for TCU late morning and declining therapy. Cancel this day

## 2017-08-04 NOTE — PROGRESS NOTES
Cardiology Progress Note  August 4, 2017    This patient was discussed with Dr. Mclain and the note below reflects our joint plan.    Interval History: Feeling well with no complaints, looking forward to discharge today.   Assessment and Recommendation:   Amelia Michel is a 56 year old female with a complex past medical history significant for congenital heart disease s/p remote VSD repair, rheumatoid arthritis on long-term methotrexate, paroxysmal atrial fibrillation/flutter/SVT and recent out of hospital cardiac arrest on 5/29 thought to be related to AV prieto blocking agents (angio normal). She made a full recovery and was discharged to TCU on 6/15 with a lifevest. Mrs. Michel was readmitted on 6/19 with fever of unknown origin and a-fib with RVR. Underwent extensive infectious work-up which was negative, bone-marrow biopsy ultimately confirmed diagnosis of DLBCL. She was transferred to the Hematology service on 7/26 for initiation of R-EPOCH chemotherapy. Cardiology following for volume status optimization and arrhythmia management.     Volume status: She remains hypervolemic on exam and is 9 lbs above EDW of 135 lbs.  -- Continue oral lasix 40 mg BID and spironolactone 12.5 daily  -- At rehab we recommend ongoing surveillance of kidney function and daily weights  -- She may not need lasix long-term, as she was not on lasix prior to her arrest and has near normal heart function      Paroxysmal a-fib: Rate controlled with atenolol and digoxin. Currently in NSR with HR in the 50's.   -- Continue current regimen  -- No anticoagulation due to thromboycytopenia    S/P cardiac arrest: Etiology remains unclear, possibly related to AV prieto blocking agents.  -- She meets criteria for secondary prevention ICD. Placement is currently on hold given LUE extravasation wound, also pending treatment and prognosis of lymphoma.   -- EP discussed risk vs benefit of Lifevest, she would like to defer at this time  -- QTc 440  "today  -- Plan to see Dr. Dickson in clinic in 2-3 months    Thank you for consulting the cardiovascular services at the [facility]. Please do not hesitate to call us with any questions.     PAVAN Reyna  [facility] Cardiology Consult Team  Pager [phone] or [phone]    Review of systems: 4 point ROS including Respiratory, CV, GI and , other than that noted in the HPI,  is negative     Objective:   Vitals: /75 (BP Location: Left leg)  Pulse 54  Temp 97.6  F (36.4  C) (Axillary)  Resp 16  Ht 1.455 m (4' 9.28\")  Wt 65.6 kg (144 lb 11.2 oz)  SpO2 95%  BMI 31 kg/m2     Intake/Output Summary (Last 24 hours) at 08/01/17 1506  Last data filed at 08/01/17 1400   Gross per 24 hour   Intake           4061.2 ml   Output             4500 ml   Net           -438.8 ml     Gen: AxO, NAD   Lungs: normal respirations, crackles LLL  CV: S1S2, 3/6 systolic murmur  Abd: Soft   Ext: 2+ dependent edema in hips and thighs  Labs:  Last Basic Metabolic Panel:  Lab Results   Component Value Date     08/04/2017      Lab Results   Component Value Date    POTASSIUM 3.8 08/04/2017     Lab Results   Component Value Date    CHLORIDE 89 08/04/2017     Lab Results   Component Value Date    VIKTORIYA 8.5 08/04/2017     Lab Results   Component Value Date    CO2 36 08/04/2017     Lab Results   Component Value Date    BUN 34 08/04/2017     Lab Results   Component Value Date    CR 0.51 08/04/2017     Lab Results   Component Value Date     08/04/2017       Diagnostic studies:     ECG 8/4: Sinus shelton, HR 55, QTc 440    Echo 7/10/17:  Interpretation Summary  Technically difficult study. Limited study.  Borderline (EF 50-55%) reduced left ventricular function is present.  Global right ventricular function is mildly reduced.  Moderate tricuspid insufficiency is present. This is unchanged from prior, and  was determined to be from prolapse on prior BRE.  No vegetations seen on this challenging study. " Consider BRE if suspicion for  endocarditis is high.  Meds per EPIC EMR:    furosemide  40 mg Oral BID     spironolactone  12.5 mg Oral Daily     insulin aspart  1-10 Units Subcutaneous TID AC     insulin aspart  1-7 Units Subcutaneous At Bedtime     atenolol  25 mg Oral Daily     melatonin  1-3 mg Oral At Bedtime     allopurinol  300 mg Oral Daily     acyclovir  400 mg Oral BID     fluconazole  200 mg Oral Daily     filgrastim (NEUPOGEN/GRANIX) intravenous  5 mcg/kg (Treatment Plan Recorded) Intravenous Daily at 8 pm     sodium chloride (PF)  10 mL Intracatheter Q7 Days     predniSONE  5 mg Oral Daily     morphine 0.1% in solosite  2 g Transdermal Daily     sodium chloride (PF)  1-74 mL Intracatheter Q8H     vitamin  B-1  50 mg Oral Daily     folic acid  1 mg Oral Daily     miconazole   Topical BID     pantoprazole  40 mg Oral QAM     digoxin  125 mcg Oral Daily     sodium chloride (PF)  10 mL Intracatheter Q8H     gabapentin  200 mg Oral TID     multivitamin, therapeutic with minerals  1 tablet Oral Daily     calcium-vitamin D  2 tablet Oral Daily

## 2017-08-04 NOTE — PROGRESS NOTES
"SPIRITUAL HEALTH SERVICES  SPIRITUAL ASSESSMENT Progress Note  [facility] ([place]) 5C     PRIMARY FOCUS:     Goals of care    Support for coping    REFERRAL SOURCE: Follow up    ILLNESS CIRCUMSTANCES:   Reviewed documentation. Reflective conversation shared with Amelia and her  Wolf which integrated elements of illness and family narratives.     Context of Serious Illness/Symptom(s) - Amelia shared that she has finished her first round of chemo and is being actively discharged to rehab. She expressed the need and desire to regain her strength so that she can be more independent once she gets home.    Resources for Support - Amelia's primary supports are Wolf and her sister; Wolf expressed working on finding more support for his own coping.    DISTRESS:     Emotional/Spiritual/Existential Distress - Amelia expressed feeling like a \"burden\" on Wolf because she is used to being independent and able to take care of herself; also expressed anxiety about transitioning to rehab because her last transition to rehab didn't go well. Wolf expressed not being sure how well he's coping and understanding that he needs to take care of himself in order to be able to help care for his wife.    Presybeterian Distress - None expressed    Social/Cultural/Economic Distress - Both expressed distress around transitioning units, then another round of chemo, and then home.    SPIRITUAL/Catholic COPING:     Shinto/Jennifer - Not discussed    Spiritual Practice(s) - Wolf in particular expressed the importance of prayer and exercise in his coping. Amelia welcomes prayer, but expressed that having a strategy and doing things to progress towards healing are the things that help her most.    Emotional/Relational/Existential Connections - Wolf requested suggestions for coping and we talked about support groups and talking to others who have experience hospitalization. 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    GOALS OF CARE:    Goals of Care - Patient hopes to transition to rehab smoothly, to regain strength and muscle mass, and ultimately be able to take care of herself when she gets home. Both Amelia and Wolf expressed understanding that Amelia will still need help at home and adjusting to their changes in roles and their normal ways of operating.    Meaning/Sense-Making - Not discussed.    PLAN: Will continue to follow up 1x/week, even as patient transitions to acute rehab.    Caitie Colón   Intern  Pager [phone]

## 2017-08-04 NOTE — PLAN OF CARE
Problem: Discharge Planning  Goal: Discharge Planning (Adult, OB, Behavioral, Peds)  Outcome: Adequate for Discharge Date Met:  08/04/17 08/04/17 1410   Discharge Planning   Patient/family verbalizes understanding of discharge plan recommendations? Yes   Medical Team notified of plan? yes   Does Patient Need a Referral for Clinic CC No   Discharge Needs Assessment   Readmission Within The Last 30 Days other (see comments)   Current Discharge Risk chronically ill   Discharge Planning Comments discharged to the TCU    Functional Level Prior   Transportation Available agency transportation   EKG 12 lead done. On tele. Hr irregular. bp 158/75 and received atenolol. Bradycardic hr 54. No pain. bs 176. Ate well. Declined therapy. Soft small stool. Will receive GCSF in the TCU. Discharged with PICC and brand catheter intact. Report was given to Erin at the TCU.

## 2017-08-04 NOTE — PLAN OF CARE
Problem: Goal Outcome Summary  Goal: Goal Outcome Summary  1. Pt will have fevers well controlled  2. Pt will be hemodynamically stable   Outcome: Improving  Afebrile. Reyes with afib. OVSS on RA. Denies pain and nausea. HS BG elevated at 356, 7 units SS given. 1hr recheck 310 - MD notified and 4 additional units ordered and given. 20mEq IV K+ replaced. Cryo currently infusing. Due for L arm wound dressing change today. Per previous RN, plan is to d/c this AM at 11:15 to TCU. No other complaints.     Problem: Sepsis (Adult)  Goal: Signs and Symptoms of Listed Potential Problems Will be Absent or Manageable (Sepsis)  Signs and symptoms of listed potential problems will be absent or manageable by discharge/transition of care (reference Sepsis (Adult) CPG).   Outcome: No Change    08/04/17 0553   Sepsis   Problems Assessed (Sepsis) all   Problems Present (Sepsis) DIC (disseminated intravascular coagulation);glycemic control, impaired

## 2017-08-04 NOTE — DISCHARGE SUMMARY
[facility], [place]    Discharge Summary  Hematology / Oncology    Date of Admission:  6/19/2017  Date of Discharge:  08/04/2017  Discharging Provider: Shyann Marinelli  Date of Service (when I saw the patient): 08/04/2017    Discharge Diagnoses   DLBCL  Hyperuricemia  DIC  Fevers -- resolved  Lactic acidosis -- resolved  Pancytopenia  Atrial fibrillation/flutter   Recent out of hospital cardiac arrest   Prior VSD repair  Bradycardia  Aortic mass  Anasarca, volume overload  LFT derrangement  RA  Saddle anesthesia  Steroid induced hyperglycemia  Extravasation-related LUE wound.  Deconditioning  Hypokalemia    History of Present Illness   Amelia Michel is a 56 year old female who was admitted on 6/19/2017. She has complex past medical history of prior VSD repair, rheumatoid arthritis on long-term methotrexate, recent dx atrial fibrillation/flutter/SVT who was recently admitted to [facility] on 5/29 after an out of hospital cardiac arrest, admission complicated by intubation with discharge to TCU. Was discharged to TCU and subsequently readmitted to Cardiology service for FUO. With workup of fever and progressive cytopenias, diagnosis of DLBCL was made. She was transferred to the Hematology service on 7/26. See discussions of diagnosis and reasoning for treatment plan in 7/27 Heme/Onc note. Started Cycle 1 R-EPOCH on 7/28. She tolerated treatment well aside from a reaction to rituxan (hypotension with BP 87/45, SOB/feeling of ?throat swelling/difficulty getting breath in; then fever/rigors). Due to extra fluids given with chemo, she was aggressively diuresed during and after chemo. In addition, K+ and Mg were followed closely. Heart rhythm improved with stabilization of lytes. Currently Ms. Michel is feeling well. Edema is stable, weight has been improving over past several days. She is eating ok, breakfast tends to be the best meal of the day. No problems with abdominal pain, n/v/d/c. BMs  small, but regular.     Hospital Course   Amelia Michel was admitted on 6/19/2017.  The following problems were addressed during her hospitalization:     HEME:    #Diffuse Large B-Cell Lymphoma. Stage 4 with bone marrow and liver involvement. IPI 4. Possibly related to prior methotrexate. PET/CT 7/18 revealing for mediastinal and neck level 2A lymphadenopathy, extensive FDG-avid bony lesions, hepatosplenic FDG-avid lesions, pelvic lesions contingous with the uterus, and bilateral pleural effusions. Bone marrow biopsy completed on 7/12 showed scattered plasma cells in perivascular clusters, with no definitive staining of B-cell population by CD5; on re-review, possibly consistent with intravascularge large B-cell lymphoma. However, liver biopsy (7/21) consistent with DLBCL, negative for BCL2/BCL6/MYC rearrangements, CD20 positive. PICC line placed and kept in place on discharge. Completed Cycle 1 of dose adjusted R-EPOCH (Day 1=7/28/17). Tolerated well overall, except for reaction to Rituxan (hypotension with BP 87/45, SOB/feeling of ?throat swelling/difficulty getting breath in; then fever/rigors). Today is Day 8. Continue daily Neupogen (starting D6, 8/2) until counts begin to recover and no longer neutropenic (ANC >1000).       #Hyperuricemia  #Monitor for TLS.  Uric acid up to 8.2 (7/27 PM), s/p Rasburicase. Uric acid improved to 4.2. Continue Allopurinol. Labs have been stable here. No evidence of true TLS.     #DIC. INR prolonged, now improving. Fibrinogen currently ok. Maintained with conditional transfusion parameters in place for 2U plasma for INR >1.8, 5U cryo for fibrinogen <150. Completed Vitamin K 10mg PO x 3 days (7/28-7/30). Coags have been stable. Cryo transfused today prior to discharge.     #Pancytopenia, secondary to DLBCL and now recent chemo. Transfuse to maintain Hgb >8.0 (keep given recent cardiac history), platelets >10,000. Will need twice weekly CBC with diff at ARU. Continue to transfuse  as needed. Counts expected to recover with daily neupogen as above.     ID:   #PPX. continue ppx ACV, Levaquin, Fluconazole.      #Persistent Fevers. -- Resolved.  #Lactic acidosis. -- Resolved.  Previously followed by ID, who signed off 7/21.  Extensive infectious evaluation including blood cultures, which remain NGTD. Additionally, stool virus panel, M. Tuberculsosis, tick-borne illnesses, and bartonella/Q-fever have all been negative. Ultimately, fevers/lactate secondary to malignancy. Possible pneumonia while recently on meropenem (7/1-7/5);  CT chest (7/13) did reveal a right lung tree-in-bud opacities with more consolidative opacities in RLL, but no accompanying focal symptoms. Transient rise in lactic acid during Rituxan infusion reaction (7/28).      CV/PV:    #Atrial fibrillation/flutter   #Recent out of hospital cardiac arrest (5/29/17)  #Prior VSD repair (1969)  PET scan suggested FDG-avid lesions leading to thickening of the interatrial septum, extending superiorly along the main pulmonary artery; although cardiac MRI 7/24 shows no evidence of infiltrative disease. Digoxin dose decreased to 125mcg daily 2/2 supratherapeutic level. Digoxin level within range on 7/30 AM. Will continue on digoxin 125mcg daily. Continue Atenolol 25mg daily (decreased 7/30). Note, per pt/, metoprolol has not worked for her and makes her feel poorly. PT also notes that she does NOT want amiodarone, as there is concern that this led to her cardiac arrest. Heart rhythms appear to have stabilized with normalization of electrolytes. EKG on day of discharge shows NSR.     #Bradycardia. HRs 39-50s (7/28-7/29). On tele strips and formal EKG 7/29 this appeared to be sinus shelton. D/w Cards team who agree with decreasing atenolol dose. Checked dig level as above. Cards aware and ok with HR. Patient is asymptomatic.       #Aortic masses. BRE on 7/14 showed 3 small masses on the coronary cusps and LVOT. Do not believe this to be  endocarditis; no antibiotics at this time. Unclear what this represents; possibly could be Libmann-Sacks.      #Anasarca, volume overload. Started on Torsemide 40 mg BID with additional PRN. Per Cards (7/28): changed diuretic to Lasix gtt at 5mg/hr given fluids with CIVI chemo (which is generally around 70cc/hr). Lasix gtt stopped 8/2 and transitioned to IV pushes. Later switched to oral Lasix 40mg BID on 8/3 (decreased dose as she is not getting as much fluids with chemo anymore). Will continue Lasix 40mg BID per Cards recs. May need to decrease to 40mg daily based on fluid status and lytes. Cr is stable. Also continue spironolactone 12.5mg. Midodrine has been on hold 2/2 hypertension. May resume if BPs decrease.    GI:   #LFT derangement. 2/2 lymphoma involvement. ALT/AST significantly improved from prior. Alk phos more elevated today, but will monitor for fluctuations. Tbili has been elevated (chemo adjusted). LFTs have been up and down some, likely related to DLBCL liver involvement and recent chemo. Will need twice weekly CMP at ARU.     #Constipation vs. diarrhea. Stools harder on 7/28. With scheduled laxatives, pt developed looser stools. Backed off on scheduled bowel regimen. Stools now soft.      NEURO:  #Saddle anesthesia, urinary incontinence. Pt reports this is baseline for her. Unclear etiology. Was initially seen in ED and evaluated by NSG in 03/2016 for this without clear explanation for symptoms. She has been noted to have an S1 radiculopathy. Seen by Neuro in 06/2017, formal consult placed while here to follow-up. Suggested could do urodynamic study on outpatient basis, also recommended looking for disease in CSF, which is also part of chemo plan.     RHEUM:   #Rheumatoid arthritis   History of seropositive rheumatoid arthritis (anti-CCP positive) since late 1980;s w/ multiple MTP osteotomies, partial right wrist fusion, longstanding therapy consisting of MTX, prednisone and HCQ. Tapered to 5 mg  prednisone on 7/17 with continued monitoring for flaring. Prednisone was held with higher dose steroids during treatment plan. Resumed 5mg dose as of 8/4. Follow-up with [facility] clinic Dr. Conor Cunha in 4-6 weeks after discharge     ENDO:  #Steroid induced hyperglycemia. Added QID BG check and med SSI.      DERM:   #Extravasation-related LUE wound. Slowly improving. WOCN following while here. Wound care orders as follows:   -Plan of care for wound located on left arm every other day:     Clean wound and skin around wound with Microklenz.     Apply Cavilon no-sting to periwound skin.    Apply Medihoney nickel thick to the areas with slough.    Cover with 2 Mepilex border dressings.     GEN:  #Deconditioning. 2/2 prolonged hospital stays, acute illness/malignancy. Seen by PT/OT while here. PM&R eval completed and cleared for ARU.     #Hypokalemia. 2/2 lasix. Started PO supplement in addition to sliding scale which was later transitioned to IV replacement 2/2 persistently low levels. Yesterday given K+ 40mEq and K+ WNL on afternoon and following AM check. Will continue oral K+ 40mEq daily. Will need to get twice weekly CMP and Mg.     #Labs. Patient should have twice weekly CBC with diff, CMP, Mg, and LDH.    Shyann Marinelli PA-C  Hematology/Oncology  Pager: [phone]    Code Status   Full Code    Primary Care Physician   Comfort Sabillon    Vital Signs with Ranges  Temp:  [95.6  F (35.3  C)-97.8  F (36.6  C)] 95.6  F (35.3  C)  Pulse:  [56] 56  Heart Rate:  [52-66] 54  Resp:  [16-20] 20  BP: (146-158)/(63-84) 146/63  SpO2:  [95 %-97 %] 96 %  145 lbs 9.6 oz    Physical Exam   Constitutional: Pleasant and cooperative female seen lying in bed. Awake, alert, NAD.  HEENT: NC/AT, EOMI, sclera clear, conjunctiva normal, OP with MMM, lower gumline lesion is improved, hardly visible  Respiratory: No increased work of breathing, CTAB, no crackles or wheezing.  Cardiovascular: RRR, systolic murmur noted.  1+ pitting edema in hips, LE edema improved with compression stockings.  GI: Normal bowel sounds, soft, non-distended and non-tender.  Skin: Warm, dry, well-perfused. LUE wound covered with dressing. No bruising, bleeding, rashes, or lesions on limited exam.  Neurologic: A&O. Answers questions appropriately. Moves all extremities spontaneously.  Neuropsychiatric: Calm, appropriate affect    Discharge Disposition   Discharged to  ARU  Condition at discharge: Good    Consultations This Hospital Stay   WOUND OSTOMY CONTINENCE NURSE  IP CONSULT  PLASTIC SURGERY IP CONSULT  LYMPHEDEMA THERAPY IP CONSULT  PHYSICAL THERAPY ADULT IP CONSULT  OCCUPATIONAL THERAPY ADULT IP CONSULT  SPEECH LANGUAGE PATH ADULT IP CONSULT  SPEECH LANGUAGE PATH ADULT IP CONSULT  SURGERY GENERAL ADULT IP CONSULT  PHARMACY TO DOSE VANCO  PHARMACY IP CONSULT  PHARMACY IP CONSULT  NUTRITION SERVICES ADULT IP CONSULT  SURGERY GENERAL ADULT IP CONSULT  VASCULAR ACCESS ADULT IP CONSULT  SOCIAL WORK IP CONSULT  PM & R IP CONSULT  INFECTIOUS DISEASE GENERAL ADULT IP CONSULT  VASCULAR ACCESS CARE ADULT IP CONSULT  CARDIOLOGY GENERAL ADULT IP CONSULT  VASCULAR ACCESS ADULT IP CONSULT  VASCULAR ACCESS ADULT IP CONSULT  CARDIOLOGY HEART FAILURE (HF) IP CONSULT  WOUND OSTOMY CONTINENCE NURSE  IP CONSULT  VASCULAR ACCESS CARE ADULT IP CONSULT  PHARMACY TO DOSE VANCO  INFECTIOUS DISEASE GENERAL ADULT IP CONSULT  HEMATOLOGY IP CONSULT  PHARMACY IP CONSULT FOR PENICILLIN ALLERGY SKIN TEST   HEMATOLOGY IP CONSULT  HEMATOLOGY IP CONSULT  VASCULAR ACCESS CARE ADULT IP CONSULT  RHEUMATOLOGY IP CONSULT  DERMATOLOGY IP CONSULT  INFECTIOUS DISEASE GENERAL ADULT IP CONSULT  VASCULAR ACCESS CARE ADULT IP CONSULT  PALLIATIVE CARE ADULT  PSYCHOLOGY ADULT IP CONSULT  VASCULAR ACCESS CARE ADULT IP CONSULT  SOCIAL WORK IP CONSULT  VASCULAR ACCESS ADULT IP CONSULT  PHYSICAL MEDICINE & REHAB GENERAL ADULT IP CONSULT  VASCULAR ACCESS CARE ADULT IP CONSULT  VASCULAR ACCESS CARE  ADULT IP CONSULT  NEUROLOGY GENERAL ADULT IP CONSULT  PHYSICAL THERAPY ADULT IP CONSULT  OCCUPATIONAL THERAPY ADULT IP CONSULT  SMOKING CESSATION PROGRAM IP CONSULT    Discharge Orders     Medication Therapy Management Referral     General info for SNF   Length of Stay Estimate: Short Term Care: Estimated # of Days <30  Condition at Discharge: Improving  Level of care:skilled   Rehabilitation Potential: Good  Admission H&P remains valid and up-to-date: Yes  Recent Chemotherapy: Date: 7/28-8/1                       Use Nursing Home Standing Orders: Yes     Mantoux instructions   Give two-step Mantoux (PPD) Per Facility Policy Yes     Reason for your hospital stay   You were here with heart rhythm problems and were also found to have diffuse large B-cell lymphoma. You started chemo while here.     Intake and output   Every shift     Daily weights   Call Provider for weight gain of more than 2 pounds per day or 5 pounds per week.     Huynh catheter   To straight gravity drainage. Change catheter every 2 weeks and PRN for leaking or decreased uring output with signs of bladder distention. DO NOT change catheter without a specific MD order IF diagnosis of benign prostatic hypertrophy (BPH), neurogenic bladder, or other urological conditions     Follow Up and recommended labs and tests   Follow-up appointments listed below.     Activity - Up ad emelia     Wound care (specify)   Site:   LUE  Instructions:  Plan of care for wound located on left arm every other day:     Clean wound and skin around wound with Microklenz.     -Apply Cavilon no-sting to periwound skin.    -Apply Medihoney nickel thick to the areas with slough.      Cover with 2 Mepilex border dressings.     Full Code     Physical Therapy Adult Consult   Evaluate and treat as clinically indicated.    Reason:  Deconditioning, weakness     Occupational Therapy Adult Consult   Evaluate and treat as clinically indicated.    Reason:  Deconditioning, weakness      Advance Diet as Tolerated   Follow this diet upon discharge: regular       Discharge Medications   Discharge Medication List as of 8/4/2017 11:32 AM      START taking these medications    Details   acyclovir (ZOVIRAX) 400 MG tablet Take 1 tablet (400 mg) by mouth 2 times daily, Disp-30 tablet, Transitional      allopurinol (ZYLOPRIM) 300 MG tablet Take 1 tablet (300 mg) by mouth daily, Disp-30 tablet, Transitional      spironolactone (ALDACTONE) 25 MG tablet Take 0.5 tablets (12.5 mg) by mouth daily, Disp-30 tablet, Transitional      furosemide (LASIX) 40 MG tablet Take 1 tablet (40 mg) by mouth 2 times daily, Disp-30 tablet, Transitional      fluconazole (DIFLUCAN) 200 MG tablet Take 1 tablet (200 mg) by mouth daily, Disp-30 tablet, Transitional      filgrastim 15 mcg/mL, in Dextrose, (NEUPOGEN) 15 mcg/ml Inject 20 mLs (300 mcg) into the vein daily, Transitional      potassium chloride SA (K-DUR/KLOR-CON M) 20 MEQ CR tablet Take 2 tablets (40 mEq) by mouth daily, Disp-90 tablet, Transitional      thiamine 50 MG TABS Take 1 tablet (50 mg) by mouth daily, Disp-30 tablet, Transitional      traMADol (ULTRAM) 50 MG tablet Take 0.5 tablets (25 mg) by mouth every 6 hours as needed for moderate pain, Disp-28 tablet, Transitional         CONTINUE these medications which have CHANGED    Details   acetaminophen (TYLENOL) 325 MG tablet Take 2 tablets (650 mg) by mouth every 4 hours as needed for mild pain, Disp-100 tablet, Transitional      atenolol (TENORMIN) 50 MG tablet Take 0.5 tablets (25 mg) by mouth daily, Disp-30 tablet, Transitional      Calcium Carb-Cholecalciferol 600-800 MG-UNIT TABS Take 2 tablets by mouth every morning, Transitional      digoxin (LANOXIN) 250 MCG tablet Take 0.5 tablets (125 mcg) by mouth daily, Disp-30 tablet, Transitional      senna-docusate (SENOKOT-S;PERICOLACE) 8.6-50 MG per tablet Take 1-2 tablets by mouth 2 times daily, Disp-100 tablet, Transitional      predniSONE (DELTASONE) 5 MG  tablet Take 1 tablet (5 mg) by mouth daily, Transitional      multivitamin, therapeutic with minerals (THERA-VIT-M) TABS tablet Take 1 tablet by mouth daily, Disp-30 each, R-0, Transitional      melatonin 1 MG TABS tablet Take 1-3 tablets (1-3 mg) by mouth nightly as needed for sleep, Transitional      !! insulin aspart (NOVOLOG PEN) 100 UNIT/ML injection Inject 1-5 Units Subcutaneous At Bedtime, Transitional      !! insulin aspart (NOVOLOG PEN) 100 UNIT/ML injection Inject 1-7 Units Subcutaneous 3 times daily (before meals), Transitional      gabapentin (NEURONTIN) 100 MG capsule Take 2 capsules (200 mg) by mouth 3 times daily, Disp-180 capsule, R-3, Transitional      folic acid (FOLVITE) 1 MG tablet Take 1 tablet (1 mg) by mouth daily, Disp-30 tablet, Transitional      ascorbic acid 500 MG TABS Take 1 tablet (500 mg) by mouth daily, Disp-30 tablet, Transitional      beta carotene 64961 UNIT capsule Take 1 capsule (25,000 Units) by mouth daily, Transitional      cetirizine (ZYRTEC) 10 MG tablet Take 1 tablet (10 mg) by mouth daily as needed for allergies, Disp-30 tablet, Transitional      morphine 0.1% in solosite topical gel Place 2 g onto the skin 3 times daily as needed, R-0, Transitional      zinc sulfate (ZINCATE) 220 (50 ZN) MG capsule Take 1 capsule (220 mg) by mouth daily, Transitional       !! - Potential duplicate medications found. Please discuss with provider.      STOP taking these medications       ertapenem (INVANZ) 1 GM vial Comments:   Reason for Stopping:         vancomycin 1,500 mg Comments:   Reason for Stopping:         apixaban ANTICOAGULANT (ELIQUIS) 2.5 MG tablet Comments:   Reason for Stopping:         triamcinolone (KENALOG) 0.1 % ointment Comments:   Reason for Stopping:         Coenzyme Q10 (COQ-10 PO) Comments:   Reason for Stopping:             Allergies   Allergies   Allergen Reactions     Metoprolol Other (See Comments)     Pt and  report that metoprolol does not work for her  and also reports feeling unwell with this medication. She has been able to tolerate atenolol, which as worked in controlling her HR.      No Clinical Screening - See Comments      Penicillin Allergy Skin Test not performed, see antimicrobial management team progress note 7/5/17.       Penicillins      Tape [Adhesive Tape] Rash       Data   Most Recent 3 CBC's:  Recent Labs   Lab Test  08/04/17   0204  08/03/17   0309  08/02/17   0402   WBC  1.6*  3.0*  2.2*   HGB  8.1*  8.6*  8.9*   MCV  92  92  91   PLT  15*  19*  19*      Most Recent 3 BMP's:  Recent Labs   Lab Test  08/04/17   0204  08/03/17   1827  08/03/17   0309   NA  131*  132*  135   POTASSIUM  3.8  4.5  4.2   CHLORIDE  89*  90*  94   CO2  36*  36*  36*   BUN  34*  37*  30   CR  0.51*  0.56  0.54   ANIONGAP  6  6  5   VIKTORIYA  8.5  8.2*  8.2*   GLC  233*  245*  199*     Most Recent 2 LFT's:  Recent Labs   Lab Test  08/04/17   0204  08/03/17   0309   AST  75*  69*   ALT  108*  91*   ALKPHOS  232*  234*   BILITOTAL  1.2  1.2     Most Recent INR's and Anticoagulation Dosing History:  Anticoagulation Dose History     Recent Dosing and Labs Latest Ref Rng & Units 7/28/2017 7/29/2017 7/29/2017 7/30/2017 7/31/2017 8/2/2017 8/4/2017    INR 0.86 - 1.14 1.82(H) 1.58(H) 1.39(H) 1.33(H) 1.36(H) 1.44(H) 1.42(H)    INR Point of Care 0.86 - 1.14 - - - - - - -

## 2017-08-04 NOTE — PLAN OF CARE
Problem: Goal Outcome Summary  Goal: Goal Outcome Summary  1. Pt will have fevers well controlled  2. Pt will be hemodynamically stable   Occupational Therapy Discharge Summary     Reason for therapy discharge:    Discharged to acute rehabilitation facility.     Progress towards therapy goal(s). See goals on Care Plan in [facility] electronic health record for goal details.  Goals partially met.  Barriers to achieving goals:   discharge from facility.     Therapy recommendation(s):    Continued therapy is recommended.  Rationale/Recommendations:  Continue to work on increasing strength, endurance and independence with ADL's with OT and PT at ARU.

## 2017-08-04 NOTE — H&P
[facility]   Acute Rehabilitation Unit  Admission History and Physical    chief complaint   Critical illness myopathy and deconditioning    History of Present illness  Amelia Michel is a 56 year old woman with a history of VSD repair (1969), Rheumatoid arthritis, and a recent diagnosis of Afib/Aflutter/SVT, who initially was admitted to [facility] on 5/29 after Out of Hospital cardiac arrest with shockable rhythm. After ROSC in the field, she was admitted from 5/29-6/15, extubated 6/4. Admitted to ARU for ongoing rehabilitation following prolonged hospitalization while at ARU (6/15-6/19), pt developed worsening LUE wound pain and fever, and was admitted back to [facility] for further workup. Underwent extensive workup in setting of fever and progressive cytopenias, ultimately diagnosed with Diffuse Large B-Cell Lymphoma.  Transferred to hematology/oncology 7/26 started on Cycle of 1 R-EPOCH 7/28.  Tolerated treatment well with exception of hypotension, sob, and fevers felt to be reaction to rituxan. Stay complicated by DIC, hyperuricemia, pancytopenia, persistent fevers resolved.  Ultimately admitted to ARU 8/4 for ongoing rehabilitation and medical management.     On arrival to ARU pt deneis any N/V, F/C, CP or SOB. Her goals for discharge are to be independent with perianal cares and mobile with walker to discharge home.     Functionally, the patient noted to have deficits in strength, activity tolerance, ROM, balance, L UE fine motor coordination, and safety.  Currently CGA for grooming, min assist for bathing, max assist for dressing, min assist for transfers and stairs.  Noted to have impaired memory and safety awareness.    Pt has premorbid LLE numbness extending from the great trochanter posteriorly to the calcaneous, fused R wrist and fused great toe BL.    PAST Medical History   Reviewed and updated in Epic.  Past Medical History:   Diagnosis Date     Hypertension       Rheumatoid arthritis(714.0)        Surgical History  Reviewed and updated in Epic.  Past Surgical History:   Procedure Laterality Date     ANESTHESIA CARDIOVERSION N/A 5/17/2017    Procedure: ANESTHESIA CARDIOVERSION;  ANESTHESIA CARDIOVERSION;  Surgeon: GENERIC ANESTHESIA PROVIDER;  Location: [facility] OR     ARTHRODESIS WRIST  2000    Right wrist     BONE MARROW ASPIRATE &BIOPSY  7/12/2017          FOOT SURGERY      4 left and 2 right     RELEASE CARPAL TUNNEL BILATERAL       REPAIR VENTRICULAR SEPTAL DEFECT  1969       SOCIAL HISTORY  Reviewed and updated in Epic.  Marital Status:   Living situation: lives with  2 jesse  Family support: supportive sister lives 4 houses away  Vocational History: worked as clinical   Tobacco use: none  Alcohol use: previously one glass of wine per night  Illicit drug use: none    FAMILY HISTORY  Reviewed and updated in Epic.  Family History   Problem Relation Age of Onset     Breast Cancer Mother      Hypertension Mother      Alzheimer Disease Mother      Hypertension Father      Blood Disease Father      Lymphoa     Circulatory Father      A Fib     DIABETES Paternal Grandmother          Prior Functional history   Prior to initial admission 5/29, pt was independent with all ADLs/IADLs, transfers, mobility and gait.      Medications  Scheduled meds  Prescriptions Prior to Admission   Medication Sig Dispense Refill Last Dose     acetaminophen (TYLENOL) 325 MG tablet Take 2 tablets (650 mg) by mouth every 4 hours as needed for mild pain 100 tablet  Unknown at Unknown time     acyclovir (ZOVIRAX) 400 MG tablet Take 1 tablet (400 mg) by mouth 2 times daily 30 tablet  8/4/17 at 0816     allopurinol (ZYLOPRIM) 300 MG tablet Take 1 tablet (300 mg) by mouth daily 30 tablet  8/4/17 at 0816     atenolol (TENORMIN) 50 MG tablet Take 0.5 tablets (25 mg) by mouth daily 30 tablet  8/4/17 at 0815     Calcium Carb-Cholecalciferol 600-800 MG-UNIT TABS Take 2 tablets by  mouth every morning   8/4/17 at 0815     digoxin (LANOXIN) 250 MCG tablet Take 0.5 tablets (125 mcg) by mouth daily 30 tablet  8/4/17 at 0815     spironolactone (ALDACTONE) 25 MG tablet Take 0.5 tablets (12.5 mg) by mouth daily 30 tablet  8/4/17 at 0816     senna-docusate (SENOKOT-S;PERICOLACE) 8.6-50 MG per tablet Take 1-2 tablets by mouth 2 times daily 100 tablet  8/4/17 at 1346     predniSONE (DELTASONE) 5 MG tablet Take 1 tablet (5 mg) by mouth daily   8/1/17 at 1638     multivitamin, therapeutic with minerals (THERA-VIT-M) TABS tablet Take 1 tablet by mouth daily 30 each 0 8/4/17 at 0816     melatonin 1 MG TABS tablet Take 1-3 tablets (1-3 mg) by mouth nightly as needed for sleep   Unknown at Unknown time     insulin aspart (NOVOLOG PEN) 100 UNIT/ML injection Inject 1-5 Units Subcutaneous At Bedtime   8/4/17 at 1415     insulin aspart (NOVOLOG PEN) 100 UNIT/ML injection Inject 1-7 Units Subcutaneous 3 times daily (before meals)   Unknown at Unknown time     gabapentin (NEURONTIN) 100 MG capsule Take 2 capsules (200 mg) by mouth 3 times daily 180 capsule 3 8/4/17 at 1346     furosemide (LASIX) 40 MG tablet Take 1 tablet (40 mg) by mouth 2 times daily 30 tablet  8/3/14 at 0933     folic acid (FOLVITE) 1 MG tablet Take 1 tablet (1 mg) by mouth daily 30 tablet  8/4/17 at 0816     fluconazole (DIFLUCAN) 200 MG tablet Take 1 tablet (200 mg) by mouth daily 30 tablet  8/4/17 at 0816     filgrastim 15 mcg/mL, in Dextrose, (NEUPOGEN) 15 mcg/ml Inject 20 mLs (300 mcg) into the vein daily   8/3/17 at 2011     ascorbic acid 500 MG TABS Take 1 tablet (500 mg) by mouth daily 30 tablet  Unknown at Unknown time     beta carotene 64226 UNIT capsule Take 1 capsule (25,000 Units) by mouth daily   Unknown at Unknown time     cetirizine (ZYRTEC) 10 MG tablet Take 1 tablet (10 mg) by mouth daily as needed for allergies 30 tablet  Unknown at Unknown time     morphine 0.1% in solosite topical gel Place 2 g onto the skin 3 times daily  "as needed  0 Unknown at Unknown time     potassium chloride SA (K-DUR/KLOR-CON M) 20 MEQ CR tablet Take 2 tablets (40 mEq) by mouth daily 90 tablet  8/4/17 at 1346     thiamine 50 MG TABS Take 1 tablet (50 mg) by mouth daily 30 tablet  8/4/17 at 0815     traMADol (ULTRAM) 50 MG tablet Take 0.5 tablets (25 mg) by mouth every 6 hours as needed for moderate pain 28 tablet  Unknown at Unknown time     zinc sulfate (ZINCATE) 220 (50 ZN) MG capsule Take 1 capsule (220 mg) by mouth daily   Unknown at Unknown time       ALLERGIES     Allergies   Allergen Reactions     Metoprolol Other (See Comments)     Pt and  report that metoprolol does not work for her and also reports feeling unwell with this medication. She has been able to tolerate atenolol, which as worked in controlling her HR.      No Clinical Screening - See Comments      Penicillin Allergy Skin Test not performed, see antimicrobial management team progress note 7/5/17.       Penicillins      Tape [Adhesive Tape] Rash         Review of Systems  A 10 point ROS was performed and negative unless otherwise noted in HPI.       Physical Exam  VITAL SIGNS:  /71 (BP Location: Right leg)  Pulse 66  Temp 96.5  F (35.8  C) (Oral)  Resp 16  Ht 1.473 m (4' 10\")  Wt 65.3 kg (144 lb)  SpO2 96%  BMI 30.1 kg/m2  BMI:  Estimated body mass index is 30.1 kg/(m^2) as calculated from the following:    Height as of this encounter: 1.473 m (4' 10\").    Weight as of this encounter: 65.3 kg (144 lb).     General: alert and oriented x 3, NAD  HEENT: AT/NC, PERRL  Pulmonary: CTA b/l, no wheezing, rhonchi, rales  Cardiovascular: S1/S2, no murmurs, rubs, gallops  Abdominal: Pos bowel sounds, soft, non tender, non distended  Extremities: moving all extremities, + edema, good pulses - L forearm wound with intact dressing, PICC Line on right arm   MSK/neuro:   Mental Status:  Cooperative, appropriate   Cranial Nerves: grossly intact 2-12    Sensory: no obvious sensory " deficits   Strength: -4/5 UE diffusely 4/5 RLE with HF, KF,KE 4-/5 in LLE diffusely   Reflexes: normal and equal throughout   Babinski reflex: no response   Tone:no increased tone, no clonus    Abnormal movements: none   Coordination: no dysmetria, no pronator drift    Speech: no obvious aphasia    Cognition: requires extra time, no apraxia, no obvious neglect, intact short term/long term memory, serial 7 difficult 5/5  Skin: warm and dry        IMPRESSION/PLAN:  Amelia Michel is a 56 year old woman with a history of VSD repair (1969), Rheumatoid arthritis, and a recent diagnosis of Afib/Aflutter/SVT, who initially was admitted to [facility] on 5/29 after Out of Hospital cardiac arrest she was admitted from 5/29-6/15, extubated 6/4. Admitted to ARU 6/15,  for ongoing rehabilitation following prolonged hospitalization while at ARU (6/15-6/19), pt developed worsening LUE wound pain and fever, and was admitted back to [facility] for further workup. Underwent extensive workup in setting of fever and progressive cytopenias, ultimately diagnosed with Diffuse Large B-Cell Lymphoma s.o chemotherapy. Admitted to ARU 8/4/2017 for ongoing medical management and aggressive rehabilitation.     Admission to acute inpatient rehab severe critical illness myopathy.    Impairment group code: 03.8      PT, and OT 90 minutes of each on a daily basis, in addition to rehab nursing and close management of physiatrist.      Impairment of ADL's: Noted to have deficits in Left upper extremity fine motor coordination, rom, activity tolerance, coordination, safety and AE use. Currently CGA for grooming, min assist for bathing, max assist for dressing due to limited flexibility and strength.  Fatigues while completing standing ADLS, currently requiring seated position to complete.  Goal for independence with ADLS with assist from spouse as needed.     Impairment of mobility:  Noted to have deficits in strength, activity tolerance, ROM, functional  mobility, and balance.  Ambulating 350 feet with FWW and CGA-SBA with tachycardia during mobility.  Ascending 3 stairs with min assist. Goals for independence with mobility.    Cognitive impairment: Pt has cognitive impairment with short term memory limited concentration and inability to focus on written word and lengthy conversation. Consider to add 60 min daily SLP therapy for formal cognitive evaluation and treatment as indicated.      1. Medical Conditions    Diffuse Large B-Cell Lymphoma- Stage 4 with bone marrow and liver involvement. IPI 4. Per Oncology Possibly related to prior methotrexate.   - PET/CT 7/18 revealing for mediastinal and neck level 2A lymphadenopathy, extensive FDG-avid bony lesions, hepatosplenic FDG-avid lesions, pelvic lesions continuous with the uterus, and bilateral pleural effusions.    - Bone marrow biopsy 7/12: scattered plasma cells in perivascular clusters, with no definitive staining of B-cell population by CD5; on re-review, possibly consistent with intravascular large B-cell lymphoma, liver biopsy (7/21) consistent with DLBCL  - Completed Cycle 1 of dose adjusted R-EPOCH (Day 1=7/28/17). Tolerated well overall, (hypotension SOB with Rituxan).  -Continue daily Neupogen (starting D6, 8/2)  PPX  - continue Levaquin, Fluconazole  -follow up oncology 8/16  -seen by palliative team during hospitalization- will consult health psychology during ARU stay for ongoing emotional support    Atrial fibrillation/flutter  Rate controlled with atenolol and digoxin. Currently in NSR with HR in the 50's.   -- No anticoagulation due to thrombocytopenia  - continue Digoxin 125 mcg daily.   - continue Atenolol 25 mg daily     Recent out of hospital cardiac arrest (5/29/17)- Etiology remains unclear, possibly related to AV prieto blocking agents.  -- She meets criteria for secondary prevention ICD. Placement is currently on hold given LUE extravasation wound, also pending treatment and prognosis of  lymphoma.   -- EP discussed risk vs benefit of Lifevest, she would like to defer at this time  -- QTc 440 today  -- Plan to see Dr. Dcikson (cardiology) in clinic in 2-3 months    Anasarca, volume overload- She remains hypervolemic on exam and is 9 lbs above EDW of 135 lbs  - trend BMP  -Lasix 40 mg BID today   - 2g Na limit  - Continue spironolactone 12.5mg   - monitor daily weights  -monitor I&O    Hyperuricemia- Uric acid up to 8.2 (7/27 PM), s/p Rasburicase. Uric acid improved to 4.2.   - Continue Allopurinol.   -monitor mag/phos/uric acid    DIC. INR prolonged, now improving. received Vitamin K 10 mg PO x 3 days (7/28-7/30)  - conditional transfusion parameters in place: 2U plasma for INR >1.8, 5U cryo for fibrinogen <150.   - coag monitoring to q MWF x 4 occurrences      Pancytopenia, secondary to DLBCL  - transfuse to maintain Hgb >8.0 (keep given recent cardiac history), platelets >10,000  -Neupogen daily as above.   -trend CBC    Bradycardia- HR 50's. On tele strips and formal EKG 7/29 this appeared to be sinus shelton. Decreased atenolol   -monitor        Saddle anesthesia, urinary incontinence. Pt reports this is baseline for her. Unclear etiology. She has been noted to have an S1 radiculopathy. Seen by Neuro( Dr. Bhatia) dated 8/1 for details   -Suggested urodynamic study on outpatient basis  -plan to investigate CNS involvement for lymphoma      Rheumatoid arthritis -History of seropositive rheumatoid arthritis (anti-CCP positive) since late 1980;s w/ multiple MTP osteotomies, partial right wrist fusion, longstanding therapy consisting of MTX, prednisone and HCQ  - Tapered to 5 mg prednisone on 7/17 held during stay resumed- per oncology-  5mg daily starting 8/4  - Follow-up with [facility] clinic Dr. Conor Cunha in 4-6 weeks after discharge      Steroid induced hyperglycemia. Added QID BG check and med SSI.       Extravasation-related LUE wound. Slowly improving.   - WOCN consult placed  -wound  care per orders        Hypokalemia. 2/2 lasix. Started PO supplement in addition to sliding scale (currently high given cardiac issues/acute illness).   - Continue  to trend bmp  -continue scheduled 40 mEq potassium    Aortic masses. BRE on 7/14 showed 3 small masses on the coronary cusps and LVOT. Do not believe this to be endocarditis; no antibiotics at this time. Unclear what this represents; per cardiology- possibly could be Libmann-Sacks.  -follow per cardiology        Nausea, mild.   - will now have scheduled antiemetic (Zofran, Emend)) with chemo, PRN Compazine/Ativan      LFT derangement- 2/2 lymphoma involvement s/p liver biopsy 7/21. ALT/AST significantly improved from prior. Alk phos more elevated 232, , AST 75 Bilirubin 0.6  -continue to trend      Hyponatremia-  in context of hypervolemia and ongoing diuresis  - continue to trend.     2. Adjustment to disability:  Health psychology consult placed  3. FEN: 2 gram NA diet  4. Bowel: incontinent at times  5. Bladder: chronic incontinence, currently has brand catheter   6. DVT Prophylaxis: mechanical (thrombocytopenia)  7. GI Prophylaxis: ? Start PPI  8. Code: full  9. Disposition: home  10. ELOS:  10-14.  11. Rehab prognosis:  good  12. Follow up Appointments on Discharge: f/u cardiology (2-3 months) , rheumatology (4/6 weeks), oncology 8/16.       Seen and discussed with Dr. Babcock , PM&R staff physician   Fatuma Murray MD    Physician Attestation   I, Mary Babcock, saw this patient with the resident and agree with the resident s findings and plan of care as documented in the resident s note.      I personally reviewed vital signs, medications, labs and imaging.    Key findings: she has good rehab potentials for return to home with her ; medically complicated but overall stable. She has generalized weakness due to critical illness myopathy/polyneuropathy with severe debility due to prolonged and complicated hospital course.  Anticipate improvement to mod I level with mobility; will need set-up for some ADLs and definitely A for iADLs.       Mary Babcock  Date of Service (when I saw the patient): 08/04/17    I spent a total of 70 minutes face-to-face and managing the care of Amelia Michel. Over 50% of my time on the unit was spent counseling the patient and coordinating care. See note for details.

## 2017-08-04 NOTE — PLAN OF CARE
"Problem: Goal/Outcome  Goal: Goal Outcome Summary  Pt admitted to ARU with diffuse large B cell lymphoma. Per report, admitted to [facility] via ambulance after cardiac arrest at home. Hx HTN, cardiogenic shock, afib/flutter, bradycardia - on cardiac monitor at [facility], refer to H&P. HR 40s 8/3/17 per report, prior to ARU admission today 8/4/7 HR 54, /75, on rm air, afebrile. Alert and oriented, alarms for safety purposes. No pain reported. BG Q 4 hrs, last bg 176 at approx 0800. Pulled peripheral IV, no longer on lasix drip. PICC in place. Incontinent of stool, BM 8/4. Hunyh in place, patent. AO1 with walker per report - \"steady on feet\". 2 gm Na diet, thin liquids. Per report contact precautions ESBL, and enteric. Hem 8.1, platelets 15, INR 1.42. Fibrinogen < 150, replaced with cryoprecipitate. Orientated pt and spouse to ARU. BG prior to lunch 232 - insulin coverage given. Dressing change complete LUE per orders. 1400 medications complete. Huynh patent, yellow urine output. Call light within reach. Continue with admission.      "

## 2017-08-04 NOTE — CONSULTS
Internal Medicine Consultation  [facility]  Hospital Medicine Service    Amelia Michel MRN# [id number]   YOB: 1960 Age: 56 year old      Date of Admission: 8/4/2017    Primary care provider: Comfort Sabillon    Requesting Provider : Dr Rubi (PM & R)    Reason for Consultation : Evaluation, recommendations and co management of medical co morbidities.           Chief Complaint:      Generalized weakness.     History of Present Illness:     History is obtained from the patient,  at bedside and medical record. Patient also known to me from recent hospitalization.   Received detailed sign out from discharging provider (hem onc team and Cardiology team). Both team felt patient stable to come to ARU.     Amelia Michel is a 56 year old woman with a history of VSD repair (1969), Rheumatoid arthritis, and a recent diagnosis of Afib/Aflutter/SVT, who initially was admitted to [facility] on 5/29 after Out of Hospital cardiac arrest with shockable rhythm. After ROSC in the field, she was admitted from 5/29-6/15, extubated 6/4. Admitted to ARU for ongoing rehabilitation following prolonged hospitalization while at ARU (6/15-6/19), pt developed worsening LUE wound pain and fever, and was admitted back to [facility] for further workup. Underwent extensive workup in setting of fever and progressive cytopenias, ultimately diagnosed with Diffuse Large B-Cell Lymphoma.  Transferred to hematology/oncology 7/26 started on Cycle of 1 R-EPOCH 7/28.  Tolerated treatment well with exception of hypotension, sob, and fevers felt to be reaction to rituxan. Stay complicated by DIC, hyperuricemia, pancytopenia, persistent fevers resolved.      Patient transferred to ARU 8/4 for ongoing rehabilitation and medical monitoring & management.      During my evaluation, reports gen weakness, severe deconditioning. Anasarca improving however still with significant edema upto thighs. Denies cp or sob. HR  controlled. Denies LH or dizziness or palpitations. No NVD. Tolerating oral. Says eating better. No fever or chills.   No obvious bleeding.     Patient and  also confirmed informed decision of not wearing life vest. Cardiology team aware.  Requested for twice daily K check until stable.    No other concern.                   Past Medical History:     Past Medical History:   Diagnosis Date     Hypertension      Rheumatoid arthritis(714.0)              Past Surgical History:     Past Surgical History:   Procedure Laterality Date     ANESTHESIA CARDIOVERSION N/A 5/17/2017    Procedure: ANESTHESIA CARDIOVERSION;  ANESTHESIA CARDIOVERSION;  Surgeon: GENERIC ANESTHESIA PROVIDER;  Location: [facility] OR     ARTHRODESIS WRIST  2000    Right wrist     BONE MARROW ASPIRATE &BIOPSY  7/12/2017          FOOT SURGERY      4 left and 2 right     RELEASE CARPAL TUNNEL BILATERAL       REPAIR VENTRICULAR SEPTAL DEFECT  1969             Social History:     Social History     Social History     Marital status:      Spouse name: Romeo Michel     Number of children: 0     Years of education: N/A     Occupational History      [employer]     Social History Main Topics     Smoking status: Never Smoker     Smokeless tobacco: Never Used     Alcohol use Yes      Comment: Glass of wine two evenings a night     Drug use: No     Sexual activity: Not on file     Other Topics Concern     Parent/Sibling W/ Cabg, Mi Or Angioplasty Before 65f 55m? No     Social History Narrative             Family History:   Reviewed.   Family History   Problem Relation Age of Onset     Breast Cancer Mother      Hypertension Mother      Alzheimer Disease Mother      Hypertension Father      Blood Disease Father      Lymphoa     Circulatory Father      A Fib     DIABETES Paternal Grandmother              Immunizations:     Immunization History   Administered Date(s) Administered     Pneumococcal 23 valent 08/31/2011     TDAP Vaccine  (Adacel) 02/08/2011            Allergies:     Allergies   Allergen Reactions     Metoprolol Other (See Comments)     Pt and  report that metoprolol does not work for her and also reports feeling unwell with this medication. She has been able to tolerate atenolol, which as worked in controlling her HR.      No Clinical Screening - See Comments      Penicillin Allergy Skin Test not performed, see antimicrobial management team progress note 7/5/17.       Penicillins      Tape [Adhesive Tape] Rash             Medications:     Prior to Admission medications    Medication Sig Start Date End Date Taking? Authorizing Provider   acetaminophen (TYLENOL) 325 MG tablet Take 2 tablets (650 mg) by mouth every 4 hours as needed for mild pain 8/4/17   hSyann Marinelli PA-C   acyclovir (ZOVIRAX) 400 MG tablet Take 1 tablet (400 mg) by mouth 2 times daily 8/4/17   Shyann Marinelli PA-C   allopurinol (ZYLOPRIM) 300 MG tablet Take 1 tablet (300 mg) by mouth daily 8/4/17   Shyann Marinelli PA-C   atenolol (TENORMIN) 50 MG tablet Take 0.5 tablets (25 mg) by mouth daily 8/4/17   Shyann Marinelli PA-C   Calcium Carb-Cholecalciferol 600-800 MG-UNIT TABS Take 2 tablets by mouth every morning 8/4/17   Shyann Marinelli PA-C   digoxin (LANOXIN) 250 MCG tablet Take 0.5 tablets (125 mcg) by mouth daily 8/4/17   Shyann Marinelli PA-C   spironolactone (ALDACTONE) 25 MG tablet Take 0.5 tablets (12.5 mg) by mouth daily 8/4/17   Shyann Marinelli PA-C   senna-docusate (SENOKOT-S;PERICOLACE) 8.6-50 MG per tablet Take 1-2 tablets by mouth 2 times daily 8/4/17   Shyann Marinelli PA-C   predniSONE (DELTASONE) 5 MG tablet Take 1 tablet (5 mg) by mouth daily 8/4/17   Shyann Marinelli PA-C   multivitamin, therapeutic with minerals (THERA-VIT-M) TABS tablet Take 1 tablet by mouth daily 8/4/17   Shyann Marinelli PA-C   melatonin 1 MG TABS tablet Take 1-3 tablets (1-3 mg) by mouth nightly as  needed for sleep 8/4/17   Shyann Marinelli PA-C   insulin aspart (NOVOLOG PEN) 100 UNIT/ML injection Inject 1-5 Units Subcutaneous At Bedtime 8/4/17   Shyann Marinelli PA-C   insulin aspart (NOVOLOG PEN) 100 UNIT/ML injection Inject 1-7 Units Subcutaneous 3 times daily (before meals) 8/4/17   Shyann Marinelli PA-C   gabapentin (NEURONTIN) 100 MG capsule Take 2 capsules (200 mg) by mouth 3 times daily 8/4/17   Shyann Marinelli PA-C   furosemide (LASIX) 40 MG tablet Take 1 tablet (40 mg) by mouth 2 times daily 8/4/17   Shyann Marinelli PA-C   folic acid (FOLVITE) 1 MG tablet Take 1 tablet (1 mg) by mouth daily 8/4/17   Shyann Marinelli PA-C   fluconazole (DIFLUCAN) 200 MG tablet Take 1 tablet (200 mg) by mouth daily 8/4/17   Shyann Marinelli PA-C   filgrastim 15 mcg/mL, in Dextrose, (NEUPOGEN) 15 mcg/ml Inject 20 mLs (300 mcg) into the vein daily 8/4/17   Shyann Marinelli PA-C   ascorbic acid 500 MG TABS Take 1 tablet (500 mg) by mouth daily 8/4/17   Shyann Marinelli PA-C   beta carotene 15788 UNIT capsule Take 1 capsule (25,000 Units) by mouth daily 8/4/17   Shyann Marinelli PA-C   cetirizine (ZYRTEC) 10 MG tablet Take 1 tablet (10 mg) by mouth daily as needed for allergies 8/4/17   Shyann Marinelli PA-C   morphine 0.1% in solosite topical gel Place 2 g onto the skin 3 times daily as needed 8/4/17   Shyann Marinelli PA-C   potassium chloride SA (K-DUR/KLOR-CON M) 20 MEQ CR tablet Take 2 tablets (40 mEq) by mouth daily 8/4/17   Shyann Marinelli PA-C   thiamine 50 MG TABS Take 1 tablet (50 mg) by mouth daily 8/4/17   Shyann Marinelli PA-C   traMADol (ULTRAM) 50 MG tablet Take 0.5 tablets (25 mg) by mouth every 6 hours as needed for moderate pain 8/4/17   Shyann Marinelli PA-C   zinc sulfate (ZINCATE) 220 (50 ZN) MG capsule Take 1 capsule (220 mg) by mouth daily 8/4/17   Shyann Marinelli PA-C        Current  Facility-Administered Medications   Medication     acetaminophen (TYLENOL) tablet 650 mg     acyclovir (ZOVIRAX) tablet 400 mg     [START ON 8/5/2017] allopurinol (ZYLOPRIM) tablet 300 mg     [START ON 8/5/2017] atenolol (TENORMIN) tablet 25 mg     [START ON 8/5/2017] calcium-vitamin D (CALTRATE) 600-400 MG-UNIT per tablet 2 tablet     cetirizine (zyrTEC) tablet 10 mg     [START ON 8/5/2017] digoxin (LANOXIN) tablet 125 mcg     filgrastim 15 mcg/mL (in Dextrose) (NEUPOGEN) infusion 300 mcg     [START ON 8/5/2017] fluconazole (DIFLUCAN) tablet 200 mg     [START ON 8/5/2017] folic acid (FOLVITE) tablet 1 mg     furosemide (LASIX) tablet 40 mg     gabapentin (NEURONTIN) capsule 200 mg     melatonin tablet 1-3 mg     morphine 0.1% in solosite topical gel 2 g     [START ON 8/5/2017] multivitamin, therapeutic with minerals (THERA-VIT-M) tablet 1 tablet     potassium chloride SA (K-DUR/KLOR-CON M) CR tablet 40 mEq     [START ON 8/5/2017] predniSONE (DELTASONE) tablet 5 mg     senna-docusate (SENOKOT-S;PERICOLACE) 8.6-50 MG per tablet 1-2 tablet     [START ON 8/5/2017] spironolactone (ALDACTONE) half-tab 12.5 mg     [START ON 8/5/2017] thiamine tablet 50 mg     traMADol (ULTRAM) half-tab 25 mg     glucose 40 % gel 15-30 g    Or     dextrose 50 % injection 25-50 mL    Or     glucagon injection 1 mg     insulin aspart (NovoLOG) inj (RAPID ACTING)     insulin aspart (NovoLOG) inj (RAPID ACTING)     naloxone (NARCAN) injection 0.1-0.4 mg     sodium chloride (PF) 0.9% PF flush 20 mL     heparin lock flush 10 UNIT/ML injection 5-10 mL     heparin lock flush 10 UNIT/ML injection 5-10 mL     sodium chloride (PF) 0.9% PF flush 10-20 mL     potassium chloride SA (K-DUR/KLOR-CON M) CR tablet 20-40 mEq     potassium chloride (KLOR-CON) Packet 20-40 mEq     potassium chloride 10 mEq in 100 mL intermittent infusion     potassium chloride 10 mEq in 100 mL intermittent infusion with 10 mg lidocaine     potassium chloride 20 mEq in 50 mL  "intermittent infusion     magnesium sulfate 2 g in NS intermittent infusion ([facility]ar[facility] or [facility] Cmpd)     magnesium sulfate 4 g in 100 mL sterile water (premade)            Review of Systems:   The 10 point Review of Systems is negative other than noted in the HPI           Physical Exam:       Blood pressure 162/58, pulse 55, temperature 95.5  F (35.3  C), temperature source Oral, resp. rate 18, height 1.473 m (4' 10\"), weight 65.3 kg (144 lb), SpO2 96 %, not currently breastfeeding.    Temp (24hrs), Av  F (36.1  C), Min:95.6  F (35.3  C), Max:97.8  F (36.6  C)      Wt Readings from Last 5 Encounters:   17 65.3 kg (144 lb)   17 66 kg (145 lb 9.6 oz)   17 75.7 kg (166 lb 12.8 oz)   17 72.8 kg (160 lb 8 oz)   17 64.4 kg (141 lb 15.6 oz)       No intake or output data in the 24 hours ending 17 1209    General: Alert, interactive, NAD, very pleasant.   HEENT: AT/NC, anicteric, PERRL, Moist MM  Neck: Supple, no JVD or cervical LAD   Chest/Resp: Clear to auscultation bilaterally, no crackles or wheezes, slightly diminished BS at bl bases.   Heart/CV: S1 S2 regular, systolic murmur 3/6 throughout.    Abdomen/ GI: Soft, nontender, nondistended. +BS.  No rebound or guarding.  Extremities/MSK: distal pulses 2+ and warm. bl 2+ pitting edema, upto thighs, hips.    Skin: Warm and dry, no jaundice or new rash . Wound Left UE on dressing.   Neuro: Alert & oriented x 3, Cns 2-12 grossly intact. Severe physical deconditioning.            Data:     All laboratory data reviewed    LABS (Last four results)  CMP  Recent Labs  Lab 17  1605 17  0204 17  1827 17  0309  17  1606 17  0402 17  0821 17  0207   * 131* 132* 135  --  134 139  --  140   POTASSIUM 4.6 3.8 4.5 4.2  < > 3.7 2.9* 3.4 3.1*   CHLORIDE 90* 89* 90* 94  --  92* 96  --  98   CO2 33* 36* 36* 36*  --  34* 35*  --  32   ANIONGAP 8 6 6 5  --  8 9  --  10   * 233* 245* 199*  --  " 297* 191*  --  230*   BUN 34* 34* 37* 30  --  30 27  --  30   CR 0.53 0.51* 0.56 0.54  --  0.60 0.50*  --  0.54   GFRESTIMATED >90Non  GFR Calc >90Non  GFR Calc >90Non  GFR Calc >90Non  GFR Calc  --  >90Non  GFR Calc >90Non  GFR Calc  --  >90Non  GFR Calc   GFRESTBLACK >90African American GFR Calc >90African American GFR Calc >90African American GFR Calc >90African American GFR Calc  --  >90African American GFR Calc >90African American GFR Calc  --  >90African American GFR Calc   VIKTORIYA 8.4* 8.5 8.2* 8.2*  --  8.3* 7.6*  --  7.5*   MAG  --  2.2 2.1 2.4*  --  2.3 2.0 2.4* 1.9   PHOS  --  3.1  --  2.3*  --   --  2.9  --  2.3*   PROTTOTAL  --  5.9*  --  5.7*  --   --  5.7* 5.8*  --    ALBUMIN  --  3.1*  --  2.9*  --   --  2.8* 2.9*  --    BILITOTAL  --  1.2  --  1.2  --   --  1.3 1.4*  --    ALKPHOS  --  232*  --  234*  --   --  261* 292*  --    AST  --  75*  --  69*  --   --  82* 93*  --    ALT  --  108*  --  91*  --   --  86* 77*  --    < > = values in this interval not displayed.  CBC    Recent Labs  Lab 17  0204 17  03017  04017  020   WBC 1.6* 3.0* 2.2* 2.4*   RBC 2.75* 2.85* 3.05* 2.62*   HGB 8.1* 8.6* 8.9* 7.9*   HCT 25.2* 26.1* 27.6* 24.5*   MCV 92 92 91 94   MCH 29.5 30.2 29.2 30.2   MCHC 32.1 33.0 32.2 32.2   RDW 24.7* 25.4* 25.6* 26.6*   PLT 15* 19* 19* 18*     INR    Recent Labs  Lab 17  0204 17  0402 17  0331 17  0158   INR 1.42* 1.44* 1.36* 1.33*     EK/4: Sinus shelton, HR 55, QTc 440  Echo 7/10/17:  Technically difficult study. Limited study.  Borderline (EF 50-55%) reduced left ventricular function is present.  Global right ventricular function is mildly reduced.  Moderate tricuspid insufficiency is present. This is unchanged from prior, and  was determined to be from prolapse on prior BRE.  No vegetations seen on this challenging study          Assessment and Recommendations:     Amelia Michel is a 56 year old female with complex past medical history of prior VSD repair, rheumatoid arthritis on long-term methotrexate, recent dx atrial fibrillation/flutter/SVT who was recently admitted to [facility] on 5/29 after an out of hospital cardiac arrest thought to be related to AV prieto blocking agents (angio normal). She made a full recovery and was discharged to rehab on 6/15 with a lifevest. Readmitted from rehab 06/19/17 for FUO & afib w rvr. With workup of fever and progressive cytopenias, diagnosis of DLBCL was made. Transferred to the Hematology service on 7/26. s/p Cycle 1 R-EPOCH.     Transferred to ARU 8/4/2017 for her ongoing rehab needs and other cares  Medicine consulted for medical co-management.     HEME/ONC:      # Diffuse Large B-Cell Lymphoma. Stage 4 with bone marrow and liver involvement. IPI 4. Possibly related to prior methotrexate. PET/CT 7/18 revealing for mediastinal and neck level 2A lymphadenopathy, extensive FDG-avid bony lesions, hepatosplenic FDG-avid lesions, pelvic lesions contingous with the uterus, and bilateral pleural effusions. Bone marrow biopsy completed on 7/12 showed scattered plasma cells in perivascular clusters, with no definitive staining of B-cell population by CD5; on re-review, possibly consistent with intravascularge large B-cell lymphoma. However, liver biopsy (7/21) consistent with DLBCL, negative for BCL2/BCL6/MYC rearrangements, CD20 positive. PICC line placed and kept in place on discharge. Completed Cycle 1 of dose adjusted R-EPOCH (Day 1=7/28/17). Tolerated well overall, except for reaction to Rituxan (hypotension with BP 87/45, SOB/feeling of ?throat swelling/difficulty getting breath in; then fever/rigors). Today is Day 8.     - Plan to Continue daily Neupogen (starting D6, 8/2) until counts begin to recover and no longer neutropenic (ANC >1000).      # Hyperuricemia: Uric acid up to 8.2 (7/27 PM), s/p  Rasburicase. Uric acid improved to 4.2. No e/o true TLS. Continue Allopurinol.      # DIC:  INR prolonged, now improving. Fibrinogen currently ok. Maintained with conditional transfusion parameters in place for 2U plasma for INR >1.8, 5U cryo for fibrinogen <150. Completed Vitamin K 10mg PO x 3 days (7/28-7/30). Coags have been stable. Cryo transfused prior to discharge.    #Pancytopenia, secondary to DLBCL and now recent chemo.   - Goal: Transfuse to maintain Hgb >8.0 (keep given recent cardiac history), platelets >10,000.   -  twice weekly CBC with diff at ARU. Continue to transfuse as needed. Counts expected to recover with daily neupogen as above.        ID:   # PPX. continue ppx ACV, Levaquin (not in discharge meds- will check with team *) Fluconazole.  #Persistent Fevers. -- Resolved. Likely related to lymphoma.   #Lactic acidosis. -- Resolved.    Extensive infectious evaluation including blood cultures, which remain NGTD. Additionally, stool virus panel, M. Tuberculsosis, tick-borne illnesses, and bartonella/Q-fever have all been negative. Ultimately, fevers/lactate thought secondary to malignancy. Possible pneumonia while recently on meropenem (7/1-7/5); Transient rise in lactic acid during Rituxan infusion reaction (7/28).           Cardiovascular: prior vsd repair 1969  Patient followed closely and co managed by cardiology team while inpatient.     # Volume status, anasarca: She remains hypervolemic on exam and is 9 lbs above EDW of 135 lbs.  Per Cardiology:   -- Continue oral lasix 40 mg BID and spironolactone 12.5 daily. Titrate as needed. Was not on lasix prior.   -- I/O, frequent BMP.    # Paroxysmal a-fib/a flutter: Rate controlled with current atenolol 25 mg daily and digoxin 125 mcg daily. Currently in NSR with HR in the 50's  Cardiology team comfortable with her current HR.   -- Continue current regimen  -- No anticoagulation due to thromboycytopenia       # S/P recent out of hospital cardiac  arrest (05/29/17): Etiology remains unclear, possibly related to AV prieto blocking agents.  -- She meets criteria for secondary prevention ICD. Placement is currently on hold given LUE extravasation wound, also pending treatment and prognosis of lymphoma.   -- EP discussed risk vs benefit of Lifevest, she would like to defer at this time  -- QTc 440 (08/04)  -- K, Mg protocol (keep above 4 and 2)  -- Plan to see Dr. Dickson in clinic in 2-3 months       GI:   #LFT derangement. 2/2 lymphoma involvement, recent chemo. Asymptomatic.   - Monitor LFT twice weekly at ARU    # Bowel regimen PRN      NEURO:  # 'saddle anesthesia' , urinary incontinence and paresthesias.    Seen by Neurology in hospital.    Per Neurology:   Subjective saddle anesthesia: Objective only present on labia majora. Lumbosacral image is negative.   Paresthesias on RLE: Sensation preserved on examen. On image no significant narrowing     Urinary incontinence: Unclear the pattern. She in on brand. On exam no signs of upper motor syndrome.     Rec:    She would benefit with a urodynamic study by Urology to know the pattern of her incontinence (detrusor hyperactivity X overflow) as an outpatient basis.   She refuses [facility] / nerve studies. ?CSF studies to investigate CNS involvement in lymphoma. She refers that it is in the plan of the primary team in the future.        RHEUM:   #Rheumatoid arthritis   History of seropositive rheumatoid arthritis (anti-CCP positive) since late 1980;s w/ multiple MTP osteotomies, partial right wrist fusion, longstanding therapy consisting of MTX, prednisone and HCQ. Tapered to 5 mg prednisone on 7/17 with continued monitoring for flaring. Prednisone was held with higher dose steroids during treatment plan. Resumed 5mg dose as of 8/4.   -- Follow-up with [facility] clinic Dr. Conor Cunha in 4-6 weeks after discharge      ENDO:  #Steroid induced hyperglycemia. Continue med SSI. Monitor BS.       DERM:    #Extravasation-related LUE wound. Slowly improving.  - Continue wound care  - WOCN consult.     Electrolytes abnormality:   Hypokalemia: 2/2 lasix. On po supplement. Monitor and replace as needed. Keep >4  Hyponatremia: 131. Fu.     Severe Physical Deconditioning. 2/2 prolonged hospital stays, acute illness/malignancy.   - Therapy per PM & R team.     Labs. Patient should have twice weekly CBC with diff, CMP, Mg, and LDH. (per discharging team)   - follow up oncology 8/16        Thank you for this interesting consultation.    We are glad to follow along with you.  Please page with any Q    D/w RN. PM&R team.  D/w Hem/Onc. Cardiology team.         Johnie Chino MD   Hospitalist (Internal Medicine)  Pager: [phone]  8/4/2017

## 2017-08-04 NOTE — IP AVS SNAPSHOT
[facility]    [address] [place] [id number]    Phone:  [phone]                                       After Visit Summary   8/4/2017    Amelia Michel    MRN: [id number]           After Visit Summary Signature Page     I have received my discharge instructions, and my questions have been answered. I have discussed any challenges I see with this plan with the nurse or doctor.    ..........................................................................................................................................  Patient/Patient Representative Signature      ..........................................................................................................................................  Patient Representative Print Name and Relationship to Patient    ..................................................               ................................................  Date                                            Time    ..........................................................................................................................................  Reviewed by Signature/Title    ...................................................              ..............................................  Date                                                            Time

## 2017-08-04 NOTE — IP AVS SNAPSHOT
` `     [facility]: [id number]            Medication Administration Report for Amelia Michel as of 08/08/17 1631   Legend:    Given Hold Not Given Due Canceled Entry Other Actions    Time Time (Time) Time  Time-Action       Inactive    Active    Linked        Medications 08/02/17 08/03/17 08/04/17 08/05/17 08/06/17 08/07/17 08/08/17    acetaminophen (TYLENOL) tablet 650 mg  Dose: 650 mg Freq: EVERY 4 HOURS PRN Route: PO  PRN Reason: mild pain  Start: 08/04/17 1213   Admin Instructions: Maximum acetaminophen dose from all sources = 75 mg/kg/day not to exceed 4 grams/day.          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  Start: 08/04/17 2100      2044 (400 mg)-Given        0909 (400 mg)-Given       2113 (400 mg)-Given        0835 (400 mg)-Given       2009 (400 mg)-Given        0814 (400 mg)-Given       2101 (400 mg)-Given        0807 (400 mg)-Given       [ ] 2100           allopurinol (ZYLOPRIM) tablet 300 mg  Dose: 300 mg Freq: DAILY Route: PO  Start: 08/05/17 0800       0908 (300 mg)-Given        0835 (300 mg)-Given        0814 (300 mg)-Given        0807 (300 mg)-Given           atenolol (TENORMIN) tablet 25 mg  Dose: 25 mg Freq: DAILY Route: PO  Start: 08/05/17 0800   Admin Instructions: Hold for sbp<100 or HR<55        0908 (25 mg)-Given        0836 (25 mg)-Given        0813 (25 mg)-Given        0806 (25 mg)-Given           calcium-vitamin D (CALTRATE) 600-400 MG-UNIT per tablet 2 tablet  Dose: 2 tablet Freq: EVERY MORNING Route: PO  Start: 08/05/17 0900       0908 (2 tablet)-Given        0836 (2 tablet)-Given        0813 (2 tablet)-Given        0807 (2 tablet)-Given           cetirizine (zyrTEC) tablet 10 mg  Dose: 10 mg Freq: DAILY PRN Route: PO  PRN Reason: allergies  Start: 08/04/17 1214              digoxin (LANOXIN) tablet 125 mcg  Dose: 125 mcg  Freq: DAILY Route: PO  Start: 08/05/17 0800       0908 (125 mcg)-Given        0836 (125 mcg)-Given        0814 (125 mcg)-Given        0808 (125 mcg)-Given           filgrastim 15 mcg/mL (in Dextrose) (NEUPOGEN) infusion 480 mcg  Dose: 480 mcg Freq: DAILY Route: IV  Start: 08/06/17 2000   Admin Instructions: Compatible with D5W only.         2010 (480 mcg)-Given        2101 (480 mcg)-Given        [ ] 2000           fluconazole (DIFLUCAN) tablet 200 mg  Dose: 200 mg Freq: DAILY Route: PO  Indications Comment: lymphoma, starting chemotherapy, neutropenic  Start: 08/05/17 0800       0909 (200 mg)-Given        0836 (200 mg)-Given        0814 (200 mg)-Given        0807 (200 mg)-Given           folic acid (FOLVITE) tablet 1 mg  Dose: 1 mg Freq: DAILY Route: PO  Start: 08/05/17 0800       0908 (1 mg)-Given        0836 (1 mg)-Given        0814 (1 mg)-Given        0807 (1 mg)-Given           furosemide (LASIX) tablet 20 mg  Dose: 20 mg Freq: 2 TIMES DAILY Route: PO  Start: 08/08/17 1000   Admin Instructions: Hold for sbp<100           1058 (20 mg)-Given       [ ] 2100           gabapentin (NEURONTIN) capsule 200 mg  Dose: 200 mg Freq: 3 TIMES DAILY Route: PO  Start: 08/04/17 1400      1346 (200 mg)-Given       2044 (200 mg)-Given        0908 (200 mg)-Given       1330 (200 mg)-Given       2113 (200 mg)-Given        0836 (200 mg)-Given       1323 (200 mg)-Given       2009 (200 mg)-Given        0814 (200 mg)-Given       1335 (200 mg)-Given       2101 (200 mg)-Given        0807 (200 mg)-Given       1347 (200 mg)-Given       [ ] 2000           glucose 40 % gel 15-30 g  Dose: 15-30 g Freq: EVERY 15 MIN PRN Route: PO  PRN Reason: low blood sugar  Start: 08/04/17 1222   Admin Instructions: Give 15 g for BG 51 to 69 mg/dL IF patient is conscious and able to swallow. Give 30 g for BG less than or equal to 50 mg/dL IF patient is conscious and able to swallow. Do NOT give glucose gel via enteral tube.  IF patient has enteral tube: give  apple juice 120 mL (4 oz or 15 g of CHO) via enteral tube for BG 51 to 69 mg/dL.  Give apple juice 240 mL (8 oz or 30 g of CHO) via enteral tube for BG less than or equal to 50 mg/dL.    ~Oral gel is preferable for conscious and able to swallow patient.   ~IF gel unavailable or patient refuses may provide apple juice 120 mL (4 oz or 15 g of CHO). Document juice on I and O flowsheet.              Or  dextrose 50 % injection 25-50 mL  Dose: 25-50 mL Freq: EVERY 15 MIN PRN Route: IV  PRN Reason: low blood sugar  Start: 08/04/17 1222   Admin Instructions: Use if have IV access, BG less than 70 mg/dL and meet dose criteria below:  Dose if conscious and alert (or disorientated) and NPO = 25 mL  Dose if unconscious / not alert = 50 mL  Vesicant.              Or  glucagon injection 1 mg  Dose: 1 mg Freq: EVERY 15 MIN PRN Route: SC  PRN Reason: low blood sugar  PRN Comment: May repeat x 1 only  Start: 08/04/17 1222   Admin Instructions: May give SQ or IM. ONLY use glucagon IF patient has NO IV access AND is UNABLE to swallow AND blood glucose is LESS than or EQUAL to 50 mg/dL.               heparin lock flush 10 UNIT/ML injection 5-10 mL  Dose: 5-10 mL Freq: EVERY 1 HOUR PRN Route: IK  PRN Reason: other  PRN Comment: to lock each CVC - Open Ended (Tunneled and Non-Tunneled) dormant lumen.  Start: 08/04/17 1820   Admin Instructions: MAX: 5 mL per lumen.        0652 (5 mL)-Given [C]        0546 (5 mL)-Given [C]       0825 (5 mL)-Given [C]        0532 (5 mL)-Given        0226 (5 mL)-Given [C]       0544 (5 mL)-Given           heparin lock flush 10 UNIT/ML injection 5-10 mL  Dose: 5-10 mL Freq: EVERY 24 HOURS Route: IK  Start: 08/04/17 2000   Admin Instructions: To lock each CVC - Open Ended (Tunneled and Non-Tunneled) dormant lumen. Check PRN heparin flush order to see when last dose of PRN heparin was given before administering.  MAX: 5 mL per lumen..       2045 (10 mL)-Given        2117 (5 mL)-Given        2011 (5  mL)-Given        2206 (5 mL)-Given        [ ] 2000           insulin aspart (NovoLOG) inj (RAPID ACTING)  Dose: 1-5 Units Freq: AT BEDTIME Route: SC  Start: 08/04/17 2200   Admin Instructions: MEDIUM INSULIN RESISTANCE DOSING    Do Not give Bedtime Correction Insulin if BG less than  200.   For  - 249 give 1 units.   For  - 299 give 2 units.   For  - 349 give 3 units.   For  -399 give 4 units.   For BG greater than or equal to 400 give 5 units.  Notify provider if glucose greater than or equal to 350 mg/dL after administration of correction dose.  If given at mealtime, must be administered 5 min before meal or immediately after.       2238 (3 Units)-Given [C]        (2129)-Not Given        (2226)-Not Given        (2205)-Not Given        [ ] 2200           insulin aspart (NovoLOG) inj (RAPID ACTING)  Dose: 1-7 Units Freq: 3 TIMES DAILY BEFORE MEALS Route: SC  Start: 08/04/17 1230   Admin Instructions: Correction Scale - MEDIUM INSULIN RESISTANCE DOSING     Do Not give Correction Insulin if Pre-Meal BG less than 140.   For Pre-Meal  - 189 give 1 unit.   For Pre-Meal  - 239 give 2 units.   For Pre-Meal  - 289 give 3 units.   For Pre-Meal  - 339 give 4 units.   For Pre-Meal - 399 give 5 units.   For Pre-Meal -449 give 6 units  For Pre-Meal BG greater than or equal to 450 give 7 units.   To be given with prandial insulin, and based on pre-meal blood glucose.    Notify provider if glucose greater than or equal to 350 mg/dL after administration of correction dose.  If given at mealtime, must be administered 5 min before meal or immediately after.       1415 (2 Units)-Given       1815 (2 Units)-Given [C]        0909 (1 Units)-Given       1331 (1 Units)-Given [C]       1838 (1 Units)-Given        (0832)-Not Given       (1300)-Not Given       (1800)-Not Given        (0815)-Not Given [C]       (1153)-Not Given [C]       1725 (1 Units)-Given        (0750)-Not Given  [C]       1308 (1 Units)-Given [C]       [ ] 1700           levofloxacin (LEVAQUIN) tablet 250 mg  Dose: 250 mg Freq: DAILY Route: PO  Indications Comment: prophylaxis. DLBCL s/p chemo.  Start: 08/05/17 1230   Admin Instructions: Administer at least 2 hrs before or 4 hrs after aluminum, calcium, iron, zinc or magnesium containing products.        1259 (250 mg)-Given        0842 (250 mg)-Given        0814 (250 mg)-Given        0808 (250 mg)-Given           magic mouthwash suspension (diphenhydramine, lidocaine, aluminum-magnesium & simethicone)  Dose: 10 mL Freq: 4 TIMES DAILY BEFORE MEALS & NIGHTLY Route: SWISH & SPIT  Start: 08/07/17 0930         1143 (10 mL)-Given       1725 (10 mL)-Given       2206 (10 mL)-Given        0655 (10 mL)-Given       1058 (10 mL)-Given       [ ] 1630       [ ] 2200           melatonin tablet 1-3 mg  Dose: 1-3 mg Freq: AT BEDTIME PRN Route: PO  PRN Reason: sleep  Start: 08/04/17 1215              meropenem (MERREM) 1 g vial to attach to  mL bag  Dose: 1 g Freq: EVERY 8 HOURS Route: IV  Indications Comment: neutropenic fever  Last Dose: 1 g (08/08/17 0748)  Start: 08/08/17 0000          0144 (1 g)-New Bag       0748 (1 g)-New Bag       [ ] 1600           morphine 0.1% in solosite topical gel 2 g  Dose: 2 g Freq: 3 TIMES DAILY PRN Route: TD  PRN Reason: moderate pain  Start: 08/04/17 1215   Admin Instructions: Apply 2 g onto the skin 3 times daily as needed for pain               multivitamin, therapeutic with minerals (THERA-VIT-M) tablet 1 tablet  Dose: 1 tablet Freq: DAILY Route: PO  Start: 08/05/17 0800       0908 (1 tablet)-Given        0836 (1 tablet)-Given        0814 (1 tablet)-Given        0808 (1 tablet)-Given           naloxone (NARCAN) injection 0.1-0.4 mg  Dose: 0.1-0.4 mg Freq: EVERY 2 MIN PRN Route: IV  PRN Reason: opioid reversal  Start: 08/04/17 1230   Admin Instructions: For respiratory rate LESS than or EQUAL to 8.  Partial reversal dose:  0.1 mg titrated q 2  minutes for Analgesia Side Effects Monitoring Sedation Level of 3 (frequently drowsy, arousable, drifts to sleep during conversation).Full reversal dose:  0.4 mg bolus for Analgesia Side Effects Monitoring Sedation Level of 4 (somnolent, minimal or no response to stimulation).               potassium chloride (KLOR-CON) Packet 20-40 mEq  Dose: 20-40 mEq Freq: EVERY 2 HOURS PRN Route: ORAL OR FEED  PRN Reason: potassium supplementation  Start: 08/04/17 1825   Admin Instructions: Use if unable to tolerate tablets.    If Serum K+ 3.4-4.0, dose = 20 mEq x1. Recheck K+ level the next AM.  If Serum K+ 3.0-3.3, dose = 60 mEq po total dose (40 mEq x 1 followed in 2 hours by 20 mEq X1). Recheck K+ level 4 hours after dose and the next AM.  If Serum K+ 2.5-2.9, dose = 80 mEq po total dose (40 mEq Q2H x2). Recheck K+ level 4 hours after dose and the next AM.  If Serum K+ less than 2.5, See IV order.  Dissolve packet contents in 4-8 ounces of cold water or juice.               potassium chloride 10 mEq in 100 mL intermittent infusion  Dose: 10 mEq Freq: EVERY 1 HOUR PRN Route: IV  PRN Reason: potassium supplementation  Start: 08/04/17 1825   Admin Instructions: Infuse via PERIPHERAL LINE or CENTRAL LINE. Use for central line replacement if patient weight less than 65 kg, if patient is on TPN with high potassium content or if unit does not stock 20 mEq bags.  If Serum K+ 3.4-4.0, dose = 10 mEq/hr x2 doses. Recheck K+ level the next AM.  If Serum K+ 3.0-3.3, dose = 10 mEq/hr x4 doses (40 mEq IV total dose). Recheck K+ level 2 hours after dose and the next AM.  If Serum K+ less than 3.0, dose = 10 mEq/hr x6 doses (60 mEq IV total dose). Recheck K+ level 2 hours after dose and the next AM.               potassium chloride 10 mEq in 100 mL intermittent infusion with 10 mg lidocaine  Dose: 10 mEq Freq: EVERY 1 HOUR PRN Route: IV  PRN Reason: potassium supplementation  Start: 08/04/17 1825   Admin Instructions: Infuse via PERIPHERAL  LINE. Use potassium with lidocaine for pain with peripheral administration.  If Serum K+ 3.4-4.0, dose = 10 mEq/hr x2 doses. Recheck K+ level the next AM.  If Serum K+ 3.0-3.3, dose = 10 mEq/hr x4 doses (40 mEq IV total dose). Recheck K+ level 2 hours after dose and the next AM.  If Serum K+ less than 3.0, dose = 10 mEq/hr x6 doses (60 mEq IV total dose). Recheck K+ level 2 hours after dose and the next AM.               potassium chloride 20 mEq in 50 mL intermittent infusion  Dose: 20 mEq Freq: EVERY 1 HOUR PRN Route: IV  PRN Reason: potassium supplementation  Start: 08/04/17 1825   Admin Instructions: Infuse via CENTRAL LINE Only.  May need EKG if less than 65 kg or on TPN - Max rate is 0.3 mEq/kg/hr for patients not on EKG monitoring.    If Serum K+ 3.4-4.0, dose = 20 mEq/hr x1 doses. Recheck K+ level the next AM.  If Serum K+ 3.0-3.3, dose = 20 mEq/hr x2 doses (40 mEq IV total dose).  Recheck K+ level 2 hours after dose and the next AM.  If Serum K+ less than 3.0, dose = 20 mEq/hr x3 doses (60 mEq IV total dose). Recheck K+ level 2 hours after dose and the next AM.               potassium chloride SA (K-DUR/KLOR-CON M) CR tablet 20-40 mEq  Dose: 20-40 mEq Freq: EVERY 2 HOURS PRN Route: PO  PRN Reason: potassium supplementation  Start: 08/04/17 1825   Admin Instructions: Use if able to take PO.   If Serum K+ 3.4-4.0, dose = 20 mEq x1. Recheck K+ level the next AM.  If Serum K+ 3.0-3.3, dose = 60 mEq po total dose (40 mEq x1 followed in 2 hours by 20 mEq x1). Recheck K+ level 4 hours after dose and the next AM.  If Serum K+ 2.5-2.9, dose = 80 mEq po total dose (40 mEq Q2H x2). Recheck K+ level 4 hours after dose and the next AM.  If Serum K+ less than 2.5, See IV order.  DO NOT CRUSH.        1019 (20 mEq)-Given        0846 (20 mEq)-Given             potassium chloride SA (K-DUR/KLOR-CON M) CR tablet 40 mEq  Dose: 40 mEq Freq: DAILY Route: PO  Start: 08/04/17 1400   Admin Instructions: DO NOT CRUSH.       1346 (40  mEq)-Given        0909 (40 mEq)-Given        0835 (40 mEq)-Given        0813 (40 mEq)-Given        0807 (40 mEq)-Given           predniSONE (DELTASONE) tablet 5 mg  Dose: 5 mg Freq: DAILY Route: PO  Start: 08/05/17 0800       0908 (5 mg)-Given        0835 (5 mg)-Given        0814 (5 mg)-Given        0806 (5 mg)-Given           senna-docusate (SENOKOT-S;PERICOLACE) 8.6-50 MG per tablet 1-2 tablet  Dose: 1-2 tablet Freq: AT BEDTIME Route: PO  Start: 08/05/17 2200 2113 (2 tablet)-Given        (2022)-Not Given               2101 (1 tablet)-Given        [ ] 2200           sodium chloride (PF) 0.9% PF flush 10-20 mL  Dose: 10-20 mL Freq: EVERY 1 HOUR PRN Route: IK  PRN Reasons: line flush,post meds or blood draw  Start: 08/04/17 1821   Admin Instructions: to flush CVC - Open Ended (Tunneled and Non-Tunneled).   10 mL post IV meds; 20 mL post blood draw.        0658 (20 mL)-Given [C]        0546 (20 mL)-Given [C]       0824 (20 mL)-Given [C]        0532 (20 mL)-Given        0227 (10 mL)-Given       0544 (10 mL)-Given           sodium chloride (PF) 0.9% PF flush 20 mL  Dose: 20 mL Freq: ONCE Route: IK  Start: 08/04/17 1615   Admin Instructions: Post blood draw       (1805)-Not Given [C]               spironolactone (ALDACTONE) half-tab 12.5 mg  Dose: 12.5 mg Freq: HOLD Route: PO  PRN Comment: until resumed; was daily  Start: 08/07/17 0700   Admin Instructions: Hold for sbp<100               thiamine tablet 50 mg  Dose: 50 mg Freq: DAILY Route: PO  Start: 08/05/17 0800       0909 (50 mg)-Given        0836 (50 mg)-Given        0814 (50 mg)-Given        0806 (50 mg)-Given           traMADol (ULTRAM) half-tab 25 mg  Dose: 25 mg Freq: EVERY 6 HOURS PRN Route: PO  PRN Reason: moderate pain  Start: 08/04/17 1216             Completed Medications  Medications 08/02/17 08/03/17 08/04/17 08/05/17 08/06/17 08/07/17 08/08/17         Dose: 400 mg Freq: 2 TIMES DAILY Route: PO  Start: 08/06/17 1315   End: 08/06/17 2009        1326  (400 mg)-Given       2009 (400 mg)-Given               Dose: 400 mg Freq: 2 TIMES DAILY Route: PO  Start: 08/05/17 1045   End: 08/05/17 2113       1147 (400 mg)-Given       2113 (400 mg)-Given             Discontinued Medications  Medications 08/02/17 08/03/17 08/04/17 08/05/17 08/06/17 08/07/17 08/08/17         Dose: 5 mcg/kg Freq: DAILY Route: IV  Start: 08/04/17 2000   End: 08/06/17 1157   Admin Instructions: Compatible with D5W only.       2112 (300 mcg)-Given        2108 (300 mcg)-Given        1157-Med Discontinued           Dose: 20 mg Freq: HOLD Route: PO  PRN Comment: until resumed; was BID  Start: 08/07/17 0700   End: 08/08/17 0955   Admin Instructions: Hold for sbp<100           0955-Med Discontinued         Dose: 20 mg Freq: 2 TIMES DAILY Route: PO  Start: 08/06/17 0900   End: 08/07/17 0648   Admin Instructions: Hold for sbp<100         0841 (20 mg)-Given       (1759)-Not Given [C]        0648-Med Discontinued          Dose: 40 mg Freq: 2 TIMES DAILY Route: PO  Start: 08/05/17 0900   End: 08/06/17 0814   Admin Instructions: Hold for sbp<100        0908 (40 mg)-Given       1702 (40 mg)-Given        0814-Med Discontinued           Dose: 12.5 mg Freq: DAILY Route: PO  Start: 08/05/17 0800   End: 08/07/17 0648   Admin Instructions: Hold for sbp<100        0909 (12.5 mg)-Given        0836 (12.5 mg)-Given        0648-Med Discontinued     Medications 08/02/17 08/03/17 08/04/17 08/05/17 08/06/17 08/07/17 08/08/17

## 2017-08-05 LAB
ALBUMIN SERPL-MCNC: 3.3 G/DL (ref 3.4–5)
ALP SERPL-CCNC: 226 U/L (ref 40–150)
ALT SERPL W P-5'-P-CCNC: 119 U/L (ref 0–50)
ANION GAP SERPL CALCULATED.3IONS-SCNC: 10 MMOL/L (ref 3–14)
AST SERPL W P-5'-P-CCNC: 69 U/L (ref 0–45)
BILIRUB SERPL-MCNC: 1.1 MG/DL (ref 0.2–1.3)
BLD PROD TYP BPU: NORMAL
BLD PROD TYP BPU: NORMAL
BLD UNIT ID BPU: 0
BLOOD PRODUCT CODE: NORMAL
BPU ID: NORMAL
BUN SERPL-MCNC: 33 MG/DL (ref 7–30)
CALCIUM SERPL-MCNC: 8.8 MG/DL (ref 8.5–10.1)
CHLORIDE SERPL-SCNC: 90 MMOL/L (ref 94–109)
CO2 SERPL-SCNC: 34 MMOL/L (ref 20–32)
CREAT SERPL-MCNC: 0.5 MG/DL (ref 0.52–1.04)
DIFFERENTIAL METHOD BLD: ABNORMAL
DIFFERENTIAL METHOD BLD: ABNORMAL
DIGOXIN SERPL-MCNC: 0.9 UG/L (ref 0.5–2)
ERYTHROCYTE [DISTWIDTH] IN BLOOD BY AUTOMATED COUNT: 23.5 % (ref 10–15)
ERYTHROCYTE [DISTWIDTH] IN BLOOD BY AUTOMATED COUNT: 23.6 % (ref 10–15)
GFR SERPL CREATININE-BSD FRML MDRD: ABNORMAL ML/MIN/1.7M2
GLUCOSE BLDC GLUCOMTR-MCNC: 154 MG/DL (ref 70–99)
GLUCOSE BLDC GLUCOMTR-MCNC: 157 MG/DL (ref 70–99)
GLUCOSE BLDC GLUCOMTR-MCNC: 164 MG/DL (ref 70–99)
GLUCOSE BLDC GLUCOMTR-MCNC: 188 MG/DL (ref 70–99)
GLUCOSE BLDC GLUCOMTR-MCNC: 316 MG/DL (ref 70–99)
GLUCOSE SERPL-MCNC: 159 MG/DL (ref 70–99)
HCT VFR BLD AUTO: 25.3 % (ref 35–47)
HCT VFR BLD AUTO: 26.5 % (ref 35–47)
HGB BLD-MCNC: 8.3 G/DL (ref 11.7–15.7)
HGB BLD-MCNC: 8.5 G/DL (ref 11.7–15.7)
MAGNESIUM SERPL-MCNC: 2 MG/DL (ref 1.6–2.3)
MCH RBC QN AUTO: 29.1 PG (ref 26.5–33)
MCH RBC QN AUTO: 29.6 PG (ref 26.5–33)
MCHC RBC AUTO-ENTMCNC: 32.1 G/DL (ref 31.5–36.5)
MCHC RBC AUTO-ENTMCNC: 32.8 G/DL (ref 31.5–36.5)
MCV RBC AUTO: 90 FL (ref 78–100)
MCV RBC AUTO: 91 FL (ref 78–100)
NUM BPU REQUESTED: 1
PLATELET # BLD AUTO: 10 10E9/L (ref 150–450)
PLATELET # BLD AUTO: 10 10E9/L (ref 150–450)
POTASSIUM SERPL-SCNC: 4 MMOL/L (ref 3.4–5.3)
PROT SERPL-MCNC: 6.1 G/DL (ref 6.8–8.8)
RBC # BLD AUTO: 2.8 10E12/L (ref 3.8–5.2)
RBC # BLD AUTO: 2.92 10E12/L (ref 3.8–5.2)
SODIUM SERPL-SCNC: 134 MMOL/L (ref 133–144)
TRANSFUSION STATUS PATIENT QL: NORMAL
TRANSFUSION STATUS PATIENT QL: NORMAL
WBC # BLD AUTO: 0.4 10E9/L (ref 4–11)
WBC # BLD AUTO: 0.4 10E9/L (ref 4–11)

## 2017-08-05 PROCEDURE — 97116 GAIT TRAINING THERAPY: CPT | Mod: GP

## 2017-08-05 PROCEDURE — 40000133 ZZH STATISTIC OT WARD VISIT: Performed by: OCCUPATIONAL THERAPIST

## 2017-08-05 PROCEDURE — 80053 COMPREHEN METABOLIC PANEL: CPT | Performed by: INTERNAL MEDICINE

## 2017-08-05 PROCEDURE — P9037 PLATE PHERES LEUKOREDU IRRAD: HCPCS | Performed by: INTERNAL MEDICINE

## 2017-08-05 PROCEDURE — 97110 THERAPEUTIC EXERCISES: CPT | Mod: GP

## 2017-08-05 PROCEDURE — 86850 RBC ANTIBODY SCREEN: CPT | Performed by: INTERNAL MEDICINE

## 2017-08-05 PROCEDURE — 25000128 H RX IP 250 OP 636

## 2017-08-05 PROCEDURE — 83735 ASSAY OF MAGNESIUM: CPT | Performed by: INTERNAL MEDICINE

## 2017-08-05 PROCEDURE — 36592 COLLECT BLOOD FROM PICC: CPT | Performed by: INTERNAL MEDICINE

## 2017-08-05 PROCEDURE — 86901 BLOOD TYPING SEROLOGIC RH(D): CPT | Performed by: INTERNAL MEDICINE

## 2017-08-05 PROCEDURE — 80162 ASSAY OF DIGOXIN TOTAL: CPT | Performed by: INTERNAL MEDICINE

## 2017-08-05 PROCEDURE — 85025 COMPLETE CBC W/AUTO DIFF WBC: CPT | Performed by: INTERNAL MEDICINE

## 2017-08-05 PROCEDURE — 97161 PT EVAL LOW COMPLEX 20 MIN: CPT | Mod: GP

## 2017-08-05 PROCEDURE — 00000146 ZZHCL STATISTIC GLUCOSE BY METER IP

## 2017-08-05 PROCEDURE — 25000125 ZZHC RX 250: Performed by: INTERNAL MEDICINE

## 2017-08-05 PROCEDURE — 97167 OT EVAL HIGH COMPLEX 60 MIN: CPT | Mod: GO | Performed by: OCCUPATIONAL THERAPIST

## 2017-08-05 PROCEDURE — 25000132 ZZH RX MED GY IP 250 OP 250 PS 637: Performed by: STUDENT IN AN ORGANIZED HEALTH CARE EDUCATION/TRAINING PROGRAM

## 2017-08-05 PROCEDURE — 97112 NEUROMUSCULAR REEDUCATION: CPT | Mod: GP

## 2017-08-05 PROCEDURE — 99233 SBSQ HOSP IP/OBS HIGH 50: CPT | Performed by: INTERNAL MEDICINE

## 2017-08-05 PROCEDURE — 40000193 ZZH STATISTIC PT WARD VISIT

## 2017-08-05 PROCEDURE — 36415 COLL VENOUS BLD VENIPUNCTURE: CPT | Performed by: INTERNAL MEDICINE

## 2017-08-05 PROCEDURE — 85027 COMPLETE CBC AUTOMATED: CPT

## 2017-08-05 PROCEDURE — 86923 COMPATIBILITY TEST ELECTRIC: CPT | Performed by: INTERNAL MEDICINE

## 2017-08-05 PROCEDURE — 25000128 H RX IP 250 OP 636: Performed by: INTERNAL MEDICINE

## 2017-08-05 PROCEDURE — 97535 SELF CARE MNGMENT TRAINING: CPT | Mod: GO | Performed by: OCCUPATIONAL THERAPIST

## 2017-08-05 PROCEDURE — 25000132 ZZH RX MED GY IP 250 OP 250 PS 637: Performed by: INTERNAL MEDICINE

## 2017-08-05 PROCEDURE — 97530 THERAPEUTIC ACTIVITIES: CPT | Mod: GP

## 2017-08-05 PROCEDURE — 12800006 ZZH R&B REHAB

## 2017-08-05 PROCEDURE — 25000128 H RX IP 250 OP 636: Performed by: PHYSICAL MEDICINE & REHABILITATION

## 2017-08-05 PROCEDURE — 86900 BLOOD TYPING SEROLOGIC ABO: CPT | Performed by: INTERNAL MEDICINE

## 2017-08-05 RX ORDER — MAGNESIUM OXIDE 400 MG/1
400 TABLET ORAL 2 TIMES DAILY
Status: COMPLETED | OUTPATIENT
Start: 2017-08-05 | End: 2017-08-05

## 2017-08-05 RX ORDER — FUROSEMIDE 40 MG
40 TABLET ORAL 2 TIMES DAILY
Status: DISCONTINUED | OUTPATIENT
Start: 2017-08-05 | End: 2017-08-06

## 2017-08-05 RX ORDER — LEVOFLOXACIN 250 MG/1
250 TABLET, FILM COATED ORAL DAILY
Status: DISCONTINUED | OUTPATIENT
Start: 2017-08-05 | End: 2017-08-08 | Stop reason: HOSPADM

## 2017-08-05 RX ORDER — AMOXICILLIN 250 MG
1-2 CAPSULE ORAL AT BEDTIME
Status: DISCONTINUED | OUTPATIENT
Start: 2017-08-05 | End: 2017-08-08 | Stop reason: HOSPADM

## 2017-08-05 RX ORDER — SODIUM CHLORIDE 9 MG/ML
INJECTION, SOLUTION INTRAVENOUS
Status: COMPLETED
Start: 2017-08-05 | End: 2017-08-05

## 2017-08-05 RX ADMIN — GABAPENTIN 200 MG: 100 CAPSULE ORAL at 13:30

## 2017-08-05 RX ADMIN — INSULIN ASPART 1 UNITS: 100 INJECTION, SOLUTION INTRAVENOUS; SUBCUTANEOUS at 18:38

## 2017-08-05 RX ADMIN — FUROSEMIDE 40 MG: 40 TABLET ORAL at 09:08

## 2017-08-05 RX ADMIN — SODIUM CHLORIDE 500 ML: 9 INJECTION, SOLUTION INTRAVENOUS at 12:59

## 2017-08-05 RX ADMIN — ATENOLOL 25 MG: 25 TABLET ORAL at 09:08

## 2017-08-05 RX ADMIN — ALLOPURINOL 300 MG: 300 TABLET ORAL at 09:08

## 2017-08-05 RX ADMIN — PREDNISONE 5 MG: 5 TABLET ORAL at 09:08

## 2017-08-05 RX ADMIN — SODIUM CHLORIDE, PRESERVATIVE FREE 5 ML: 5 INJECTION INTRAVENOUS at 21:17

## 2017-08-05 RX ADMIN — POTASSIUM CHLORIDE 20 MEQ: 750 TABLET, FILM COATED, EXTENDED RELEASE ORAL at 10:19

## 2017-08-05 RX ADMIN — INSULIN ASPART 1 UNITS: 100 INJECTION, SOLUTION INTRAVENOUS; SUBCUTANEOUS at 13:31

## 2017-08-05 RX ADMIN — THIAMINE HCL (VITAMIN B1) 50 MG TABLET 50 MG: at 09:09

## 2017-08-05 RX ADMIN — FOLIC ACID 1 MG: 1 TABLET ORAL at 09:08

## 2017-08-05 RX ADMIN — ACYCLOVIR 400 MG: 400 TABLET ORAL at 09:09

## 2017-08-05 RX ADMIN — MAGNESIUM OXIDE TAB 400 MG (241.3 MG ELEMENTAL MG) 400 MG: 400 (241.3 MG) TAB at 11:47

## 2017-08-05 RX ADMIN — SODIUM CHLORIDE, PRESERVATIVE FREE 5 ML: 5 INJECTION INTRAVENOUS at 06:52

## 2017-08-05 RX ADMIN — GABAPENTIN 200 MG: 100 CAPSULE ORAL at 21:13

## 2017-08-05 RX ADMIN — POTASSIUM CHLORIDE 40 MEQ: 750 TABLET, FILM COATED, EXTENDED RELEASE ORAL at 09:09

## 2017-08-05 RX ADMIN — MAGNESIUM OXIDE TAB 400 MG (241.3 MG ELEMENTAL MG) 400 MG: 400 (241.3 MG) TAB at 21:13

## 2017-08-05 RX ADMIN — INSULIN ASPART 1 UNITS: 100 INJECTION, SOLUTION INTRAVENOUS; SUBCUTANEOUS at 09:09

## 2017-08-05 RX ADMIN — LEVOFLOXACIN 250 MG: 250 TABLET, FILM COATED ORAL at 12:59

## 2017-08-05 RX ADMIN — GABAPENTIN 200 MG: 100 CAPSULE ORAL at 09:08

## 2017-08-05 RX ADMIN — MULTIPLE VITAMINS W/ MINERALS TAB 1 TABLET: TAB at 09:08

## 2017-08-05 RX ADMIN — ACYCLOVIR 400 MG: 400 TABLET ORAL at 21:13

## 2017-08-05 RX ADMIN — FLUCONAZOLE 200 MG: 200 TABLET ORAL at 09:09

## 2017-08-05 RX ADMIN — Medication 12.5 MG: at 09:09

## 2017-08-05 RX ADMIN — FUROSEMIDE 40 MG: 40 TABLET ORAL at 17:02

## 2017-08-05 RX ADMIN — SENNOSIDES AND DOCUSATE SODIUM 2 TABLET: 8.6; 5 TABLET ORAL at 21:13

## 2017-08-05 RX ADMIN — DIGOXIN 125 MCG: 125 TABLET ORAL at 09:08

## 2017-08-05 RX ADMIN — CALCIUM CARBONATE 600 MG (1,500 MG)-VITAMIN D3 400 UNIT TABLET 2 TABLET: at 09:08

## 2017-08-05 RX ADMIN — Medication 300 MCG: at 21:08

## 2017-08-05 NOTE — PLAN OF CARE
Problem: Goal/Outcome  Goal: Goal Outcome Summary  PT: Initial evaluation complete, treatment initiated     Pt is SBA - CGA for most functional mobility w/ use of FWW and gait belt. Pt is most limited by fatigue and decreased endurance as well as proximal ms weakness, sensory, and balance impairments at this time. Pt unable to take step w/o UE support. Pt will require PT after DC from ARU. HHPT vs OPPT depending on progress.      Eval and treat provided in room as N95 mask not received yet      Predict 10 day LOS. Pt is immunosupporess, N95 mask ordered to  to wear when ambulating in hallway

## 2017-08-05 NOTE — PLAN OF CARE
Problem: Goal/Outcome  Goal: Goal Outcome Summary  FOCUS/GOAL  Bladder management, Medication management and Medical management     ASSESSMENT, INTERVENTIONS AND CONTINUING PLAN FOR GOAL:  Pt alert and oriented, demonstrates understanding with medications and repositioning. Mepilex intact to LUE. Left low back biopsy site MATTHIAS with no drainage. Double lumen PICC RUE heparin locked. Denied pain. Good appetite. Denies having diabetes. Received novolog x2 and had milk at bedtime. Pt remained in bed this shift, transfers with assist of 1 and walker. Huynh patent and cares done. Last BM 8/4.

## 2017-08-05 NOTE — PROGRESS NOTES
08/05/17 0758   Quick Adds   Type of Visit Initial PT Evaluation   Living Environment   Lives With spouse   Living Arrangements house   Home Accessibility stairs to enter home   Number of Stairs to Enter Home 3   Number of Stairs Within Home 0   Stair Railings at Home none   Transportation Available agency transportation   Living Environment Comment Family looking into railing for stairs and grab bar for bathroom   Self-Care   Dominant Hand right   Usual Activity Tolerance fair   Current Activity Tolerance poor   Regular Exercise no   Activity/Exercise Type walking   Equipment Currently Used at Home none   Activity/Exercise/Self-Care Comment PT: Pt walked daily w/ goal of 8,000 steps    Functional Level Prior   Ambulation 0-->independent   Transferring 0-->independent   Toileting 0-->independent   Bathing 0-->independent   Dressing 0-->independent   Fall history within last six months no   Which of the above functional risks had a recent onset or change? ambulation;transferring   Prior Functional Level Comment PT: Independent prior   General Information   Onset of Illness/Injury or Date of Surgery - Date 08/04/17   Referring Physician Dr. Rubi   Patient/Family Goals Statement To go home independently, walk w/o AD   Pertinent History of Current Problem (include personal factors and/or comorbidities that impact the POC) Per med chart: Amelia Michel is a 56 year old woman with a history of VSD repair (1969), Rheumatoid arthritis, and a recent diagnosis of Afib/Aflutter/SVT, who initially was admitted to [facility] on 5/29 after Out of Hospital cardiac arrest with shockable rhythm. After ROSC in the field, she was admitted from 5/29-6/15, extubated 6/4. Admitted to ARU for ongoing rehabilitation following prolonged hospitalization while at ARU (6/15-6/19), pt developed worsening LUE wound pain and fever, and was admitted back to [facility] for further workup. Underwent extensive workup in setting of fever and progressive  cytopenias, ultimately diagnosed with Diffuse Large B-Cell Lymphoma.  Transferred to hematology/oncology 7/26 started on Cycle of 1 R-EPOCH 7/28.  Tolerated treatment well with exception of hypotension, sob, and fevers felt to be reaction to rituxan. Stay complicated by DIC, hyperuricemia, pancytopenia, persistent fevers resolved.  Ultimately admitted to ARU 8/4 for ongoing rehabilitation and medical management.    Precautions/Limitations immunosuppressed;fall precautions   General Observations N95 Mask when OOR   Cognitive Status Examination   Orientation orientation to person, place and time   Level of Consciousness alert   Follows Commands and Answers Questions 100% of the time   Personal Safety and Judgment intact   Memory intact   Integumentary/Edema   Integumentary/Edema Comments Trace BLE swelling, BLE compression stockings donned    Posture    Posture Forward head position;Protracted shoulders   Range of Motion (ROM)   ROM Comment WFL   Strength   Strength Comments WFL however grossly LUE weaker than RUE. RLE weaker than LLE    MMT: Hip, Rehab Eval   Hip Flexion - Left Side (3/5) fair, left   Hip ABduction - Left Side (3+/5) fair plus, left   Hip ADduction - Left Side (3+/5) fair plus, left   Hip Flexion - Right Side (3-/5) fair minus, right   Hip ABduction - Right Side (3/5) fair, right   MMT: Knee, Rehab Eval   Knee Flexion - Left Side (4/5) good, left   Knee Extension - Left Side (4/5) good, left   Knee Flexion - Right Side (4/5) good, right   Knee Extension - Right Side (4/5) good, right   MMT: Ankle, Rehab Eval   Ankle Dorsiflexion - Left Side (4/5) good, left   Ankle Plantarflexion - Left Side (4/5) good, left   Ankle Dorsiflexion - Right Side (4/5) good, left   Ankle Plantarflexion - Right Side (4/5) good, left   ARC Assessment Only   Acute Rehab FIM See FIM scores for Mobility/ADL Assessment   Balance   Balance Comments Good static/dynamic sitting. Poor dynamic standing. Fair static standing    Sensory Examination   Sensory Perception Comments Intact to light touch however diminished sensation right LE from glut to calcaneus posteriorly   Coordination   Coordination Comments WFL   General Therapy Interventions   Planned Therapy Interventions balance training;bed mobility training;gait training;neuromuscular re-education;strengthening;transfer training;home program guidelines;progressive activity/exercise;groups   Clinical Impression   Criteria for Skilled Therapeutic Intervention yes, treatment indicated   PT Diagnosis Decreased functional mobility and endurance   Influenced by the following impairments weakness, endurance, balance, sensory   Functional limitations due to impairments Decreased functional mobility and endurance   Clinical Presentation Stable/Uncomplicated   Clinical Presentation Rationale good family support, little physical assist required, pt motivated   Clinical Decision Making (Complexity) Low complexity   Therapy Frequency` daily   Predicted Duration of Therapy Intervention (days/wks) 10-14 days   Anticipated Discharge Disposition Home with Home Therapy;Home with Outpatient Therapy   Risk & Benefits of therapy have been explained Yes   Patient, Family & other staff in agreement with plan of care Yes   Clinical Impression Comments Pt is SBA - CGA for most functional mobility. Pt is most limited by fatigue and decreased endurance as well as proximal ms weakness, sensory impairments, and balance impairments at this time. Pt will require PT after DC from ARU. HHPT vs OPPT depending on progress. Predict 10 day LOS. Pt is immunosupporess, N95 mask ordered to rm to wear when ambulating in hallway    Total Evaluation Time   Total Evaluation Time (Minutes) 20

## 2017-08-05 NOTE — PROGRESS NOTES
[facility], [place]   Hospitalist Daily Progress Note                                                 Date of Admission:2017  ___________________________________  INTERVAL HISTORY (24 Hrs)/SUBJECTIVE:   Last 24 hr events, care team notes reviewed.     Doing well  No new s/s  No fever or chills  No obvious bleeding or new bruising.   No cp or sob.   Diuresing well.     ROS: 4 point ROS neg other than the symptoms noted above in the interval history.    OBJECTIVE :   VITALS:    Temp:  [95.5  F (35.3  C)-97.1  F (36.2  C)] 97.1  F (36.2  C)  Pulse:  [55-66] 64  Resp:  [16-20] 16  BP: (144-179)/(58-71) 179/70  SpO2:  [96 %-97 %] 97 %     Temp (24hrs), Av.3  F (35.7  C), Min:95.5  F (35.3  C), Max:97.1  F (36.2  C)    Wt Readings from Last 5 Encounters:   17 62.8 kg (138 lb 6.4 oz)   17 66 kg (145 lb 9.6 oz)   17 75.7 kg (166 lb 12.8 oz)   17 72.8 kg (160 lb 8 oz)   17 64.4 kg (141 lb 15.6 oz)        Intake/Output Summary (Last 24 hours) at 17 0900  Last data filed at 17 0623   Gross per 24 hour   Intake              480 ml   Output             4225 ml   Net            -3745 ml       PHYSICAL EXAM:  General: alert, interactive, NAD  HEENT: AT/NC,  Moist MM  Respi/Chest: Non labored.  CVS/Heart: S1S2 regular,   Gi/Abd: Soft, non tender, non distended,   MSK/Ext: Distal pulses 2+.  bl 2+ pedal edema upto thighs.   Neuro: AO x 4, generalized physical deconditioning. Moving all extremities.      Medications:   I have reviewed this patient's current medications.    Data:   All laboratory and imaging data in the past 24 hours reviewed:    LABS:  CMP  Recent Labs  Lab 17  0659 17  1605 17  0204 17  1827 17  0309  17  0402  17  0207    131* 131* 132* 135  < > 139  --  140   POTASSIUM 4.0 4.6 3.8 4.5 4.2  < > 2.9*  < > 3.1*   CHLORIDE 90* 90* 89* 90* 94  < > 96  --  98   CO2 34* 33* 36* 36* 36*  < > 35*   --  32   ANIONGAP 10 8 6 6 5  < > 9  --  10   * 223* 233* 245* 199*  < > 191*  --  230*   BUN 33* 34* 34* 37* 30  < > 27  --  30   CR 0.50* 0.53 0.51* 0.56 0.54  < > 0.50*  --  0.54   GFRESTIMATED >90Non  GFR Calc >90Non  GFR Calc >90Non  GFR Calc >90Non  GFR Calc >90Non  GFR Calc  < > >90Non  GFR Calc  --  >90Non  GFR Calc   GFRESTBLACK >90African American GFR Calc >90African American GFR Calc >90African American GFR Calc >90African American GFR Calc >90African American GFR Calc  < > >90African American GFR Calc  --  >90African American GFR Calc   VIKTORIYA 8.8 8.4* 8.5 8.2* 8.2*  < > 7.6*  --  7.5*   MAG 2.0  --  2.2 2.1 2.4*  < > 2.0  < > 1.9   PHOS  --   --  3.1  --  2.3*  --  2.9  --  2.3*   PROTTOTAL 6.1*  --  5.9*  --  5.7*  --  5.7*  < >  --    ALBUMIN 3.3*  --  3.1*  --  2.9*  --  2.8*  < >  --    BILITOTAL 1.1  --  1.2  --  1.2  --  1.3  < >  --    ALKPHOS 226*  --  232*  --  234*  --  261*  < >  --    AST 69*  --  75*  --  69*  --  82*  < >  --    *  --  108*  --  91*  --  86*  < >  --    < > = values in this interval not displayed.  CBC  Recent Labs  Lab 08/05/17  0659 08/04/17  0204 08/03/17  0309 08/02/17  0402   WBC 0.4* 1.6* 3.0* 2.2*   RBC 2.80* 2.75* 2.85* 3.05*   HGB 8.3* 8.1* 8.6* 8.9*   HCT 25.3* 25.2* 26.1* 27.6*   MCV 90 92 92 91   MCH 29.6 29.5 30.2 29.2   MCHC 32.8 32.1 33.0 32.2   RDW 23.6* 24.7* 25.4* 25.6*   PLT 10* 15* 19* 19*     INR  Recent Labs  Lab 08/04/17  0204 08/02/17  0402 07/31/17  0331 07/30/17  0158   INR 1.42* 1.44* 1.36* 1.33*     Unresulted Labs Ordered in the Past 30 Days of this Admission     Date and Time Order Name Status Description    7/28/2017 0240 Blood culture Preliminary     7/28/2017 0210 Blood culture Preliminary     7/28/2017 0115 Transfusion reaction evaluation In process     7/28/2017 0115 Transfusion reaction evaluation In process            EK/4: Sinus shelton, HR 55, QTc 440  Echo 7/10/17:  Technically difficult study. Limited study.  Borderline (EF 50-55%) reduced left ventricular function is present.  Global right ventricular function is mildly reduced.  Moderate tricuspid insufficiency is present. This is unchanged from prior, and  was determined to be from prolapse on prior BRE.  No vegetations seen on this challenging study    ASSESSMENT & PLAN :    Amelia Michel is a 56 year old female with complex past medical history of prior VSD repair, rheumatoid arthritis on long-term methotrexate, recent dx atrial fibrillation/flutter/SVT who was recently admitted to [facility] on  after an out of hospital cardiac arrest thought to be related to AV prieto blocking agents (angio normal). She made a full recovery and was discharged to rehab on 6/15 with a lifevest. Readmitted from rehab 17 for FUO & afib w rvr. With workup of fever and progressive cytopenias, diagnosis of DLBCL was made. Transferred to the Hematology service on . s/p Cycle 1 R-EPOCH.      Transferred to ARU 2017 for her ongoing rehab needs and other cares  Medicine consulted for medical co-management.      HEME/ONC:       # Diffuse Large B-Cell Lymphoma. Stage 4 with bone marrow and liver involvement. IPI 4. Possibly related to prior methotrexate. PET/CT  revealing for mediastinal and neck level 2A lymphadenopathy, extensive FDG-avid bony lesions, hepatosplenic FDG-avid lesions, pelvic lesions contingous with the uterus, and bilateral pleural effusions. Bone marrow biopsy completed on  showed scattered plasma cells in perivascular clusters, with no definitive staining of B-cell population by CD5; on re-review, possibly consistent with intravascularge large B-cell lymphoma. However, liver biopsy () consistent with DLBCL, negative for BCL2/BCL6/MYC rearrangements, CD20 positive. PICC line placed and kept in place on discharge. Completed Cycle 1 of dose  adjusted R-EPOCH (Day 1=7/28/17). Tolerated well overall, except for reaction to Rituxan (hypotension with BP 87/45, SOB/feeling of ?throat swelling/difficulty getting breath in; then fever/rigors).   Last dose 08/01.     - patient with worsening neutropenia wbc: 0.4, plt 10K 8/5/2017. With no fever or new focal infectious s/s. No bleeding.   - Transfuse platelets per Protocol today 8/5/2017   Goal: Transfuse to maintain Hgb >8.0, platelets >=10,000.   - Continue daily Neupogen until counts begin to recover and no longer neutropenic (ANC >1000).   - daily CBC w diff for now.   - Neutropenic precautions.     8/5/2017  D/w Hem onc fellow on call regarding absolute neutropenia.  Rec: to monitor for fever. Bleeding.  Consider infectious work up including BC x 2 sites. UA. UC and start empirically on iv Cefepime for any fever.   Prophylactic antibiotics as below.   Consider transferring to [facility] if becomes febrile or septic.          ID:   # PPX. continue ppx ACV, Levaquin,  Fluconazole.  #Persistent Fevers. -- Resolved. Likely related to lymphoma.   #Lactic acidosis. -- Resolved.     Extensive infectious evaluation including blood cultures, which remain NGTD. Additionally, stool virus panel, M. Tuberculsosis, tick-borne illnesses, and bartonella/Q-fever have all been negative. Ultimately, fevers/lactate thought secondary to malignancy. Possible pneumonia while recently on meropenem (7/1-7/5); Transient rise in lactic acid during Rituxan infusion reaction (7/28).          Cardiovascular: prior vsd repair 1969  Patient followed closely and co managed by cardiology team while inpatient.      # Volume status, anasarca: She remains hypervolemic on exam and is 9 lbs above EDW of 135 lbs.  Per Cardiology:   -- Continue oral lasix 40 mg BID and spironolactone 12.5 daily. Titrate as needed. Was not on lasix prior.   -- I/O, frequent BMP.     # Paroxysmal a-fib/a flutter: Rate controlled with current atenolol 25 mg daily and  digoxin 125 mcg daily. Currently in NSR with HR in the 50's  Cardiology team comfortable with her current HR.   -- Continue current regimen  -- No anticoagulation due to thromboycytopenia         # S/P recent out of hospital cardiac arrest (05/29/17): Etiology remains unclear, possibly related to AV prieto blocking agents.  -- She meets criteria for secondary prevention ICD. Placement is currently on hold given LUE extravasation wound, also pending treatment and prognosis of lymphoma.   -- EP discussed risk vs benefit of Lifevest, she would like to defer at this time  -- QTc 440 (08/04)  -- K, Mg protocol (keep above 4 and 2)  -- Plan to see Dr. Dickson in clinic in 2-3 months         GI:   #LFT derangement. 2/2 lymphoma involvement, recent chemo. Asymptomatic.   - Monitor LFT twice weekly at ARU     # Bowel regimen PRN      NEURO:  # 'saddle anesthesia' , urinary incontinence and paresthesias.    seen by Neurology in hospital  Rec:   She would benefit with a urodynamic study by Urology to know the pattern of her incontinence (detrusor hyperactivity X overflow) as an outpatient basis.   ?CSF studies to investigate CNS involvement in lymphoma. She refers that it is in the plan of the primary team in the future.        RHEUM:   #Rheumatoid arthritis   History of seropositive rheumatoid arthritis (anti-CCP positive) since late 1980;s w/ multiple MTP osteotomies, partial right wrist fusion, longstanding therapy consisting of MTX, prednisone and HCQ. Tapered to 5 mg prednisone on 7/17 with continued monitoring for flaring. Prednisone was held with higher dose steroids during treatment plan. Resumed 5mg dose as of 8/4.   -- Follow-up with [facility] Rheumatology clinic Dr. Conor Cunha in 4-6 weeks after discharge      ENDO:  #Steroid induced hyperglycemia. Continue med SSI. Monitor BS.       Recent Labs  Lab 08/05/17  0824 08/05/17  0659 08/04/17  2120 08/04/17  1714 08/04/17  1605 08/04/17  1312 08/04/17  0828  08/04/17  0204 08/03/17  2209  08/03/17  1827  08/03/17  0309  08/02/17  1606   GLC  --  159*  --   --  223*  --   --  233*  --   --  245*  --  199*  --  297*   *  --  316* 219*  --  232* 176*  --  310*  < >  --   < >  --   < >  --    < > = values in this interval not displayed.    DERM:   #Extravasation-related LUE wound. Slowly improving.  - Continue wound care  - WOCN consult.      Electrolytes abnormality:   Hypokalemia: 2/2 lasix. On po supplement. Monitor and replace as needed. Keep >4  Mg: keep above 2.0. MgO 400 x 2.   Hyponatremia: 131 -> 134     Severe Physical Deconditioning. 2/2 prolonged hospital stays, acute illness/malignancy.   - Therapy per PM & R team.      Labs. Patient should have twice weekly CBC with diff, CMP, Mg, and LDH. (per discharging team)   - follow up at oncology clinic 8/16         Thank you for this interesting consultation.    Will awilda.   Please page with any Q     D/w RN. PM&R team.      Johnie Chino MD   Hospitalist (Internal Medicine)  Pager: [phone].

## 2017-08-05 NOTE — PLAN OF CARE
Problem: Goal/Outcome  Goal: Goal Outcome Summary  Outcome: Therapy, progress toward functional goals as expected  OT evaluation completed.  Pt with extensive recent medical history, but currently functioning at a min A level for BADL.  Pt limited primarily by deconditioning/decreased endurance, left UE weakness, left hand decreased coordination, and decrease in cognition.  Pt will benefit from approximately 7 days of inpt OT to address deficits, teach compensatory strategies, and assist with disposition planning.

## 2017-08-05 NOTE — PLAN OF CARE
Problem: Goal Outcome Summary  Goal: Goal Outcome Summary  1. Pt will have fevers well controlled  2. Pt will be hemodynamically stable   Physical Therapy Discharge Summary     Reason for therapy discharge:    Discharged to acute rehabilitation facility.     Progress towards therapy goal(s). See goals on Care Plan in [facility] electronic health record for goal details.  Goals partially met.  Barriers to achieving goals:   discharge from facility.     Therapy recommendation(s):    Continued therapy is recommended.  Rationale/Recommendations:  To ARU to progress with strengthening, ambulation, functional endurance..

## 2017-08-05 NOTE — PROVIDER NOTIFICATION
Social Work: Initial Assessment with Discharge Plan    Patient Name: Amelia Michel  : 1960  Age: 56 year old  MRN: [id number]  Completed assessment with: patient  Admitted to ARU: 17    Presenting Information   Date of SW assessment: 2017  Health Care Directive: Patient considering completing  Primary Health Care Agent: default to next of kin  Secondary Health Care Agent: NA  Living Situation: resides with her  in [place]  Previous Functional Status: previously independent of cares  DME available: see therapy notes  Patient and family understanding of hospitalization: Yes  Cultural/Language/Spiritual Considerations: English speaking      Physical Health  Reason for admission: Cardiac Arrest    Provider Information   Primary Care Physician:Comfort Sabillon MD  : none    Mental Health/Chemical Dependency:   Diagnosis: Plan to connect with Dr Miguel for ongoing support  Alcohol/Tobacco/Narcotis: denied  Support/Services in Place: none  Services Needed/Recommended: none  Sexuality/Intimacy: denied concerns    Support System  Marital Status: ,  Romeo  Family support: pt reported that her sister resides four houses away and will be available for additional support  Other support available: none    Community Resources  Current in home services: none  Previous services: none    Financial/Employment/Education  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  Income Source: employment  Education: trade  Financial Concerns:  denied  Insurance: [employer]erred One      Discharge Plan   Patient and family discharge goal: Goal to return home with continued support from family  Provided Education on discharge plan: Yes  Patient agreeable to discharge plan:  Yes  Provided education and attained signature for Medicare IM and IRF Patient Rights and Privacy Information provided to patient : No  Provided patient with [place] [facility] [place]  Resources: No  Barriers to discharge: Medically stable and progress with therapies    Discharge Recommendations   Disposition: Goal to return home with continued support from family  Transportation Needs: family will provide  Name of Transportation Company and Phone: NA    Additional comments   Pt is a 56 yr old female admitted for an acute rehab stay following cardiac arrest. Pt resides with her  in [place]. Pts  is currently working half-time and planning to continue doing so when pt returns home. Pt stated that her  and sister are available for additional support. Team working towards a safe d/c plan.     Please invite to Care Conference:  Romeo (spouse) - [phone]      ANDIE Hammonds, [facility]  [place]   Phone: [phone]  Pager: [phone]         08/05/17 1126   Living Arrangements   Lives With spouse   Living Arrangements house   Able to Return to Prior Living Arrangements yes   Home Safety   Patient Feels Safe Living in Home? yes   Discharge Planning   Anticipated Discharge Disposition home   Discharge Needs Assessment   Medical Team notified of plan? yes   Readmission Within The Last 30 Days no previous admission in last 30 days   Equipment Currently Used at Home none   Transportation Available family or friend will provide

## 2017-08-05 NOTE — PROGRESS NOTES
08/05/17 0707   Living Environment   Lives With spouse   Home Accessibility stairs to enter home   Number of Stairs to Enter Home 3   Number of Stairs Within Home 0   Stair Railings at Home none   Transportation Available family or friend will provide   Living Environment Comment Considering railing for enterance stairs and GB in bathroom.   Self-Care   Dominant Hand right   Usual Activity Tolerance fair   Current Activity Tolerance poor   Regular Exercise yes   Activity/Exercise Type walking   Exercise Amount/Frequency daily   Activity/Exercise/Self-Care Comment Pt walked daily to achieve 8,000 steps total   Functional Level Prior   Ambulation 0-->independent   Transferring 0-->independent   Toileting 0-->independent   Bathing 0-->independent   Dressing 0-->independent   Eating 0-->independent   Communication 0-->understands/communicates without difficulty   Swallowing 0-->swallows foods/liquids without difficulty   Cognition 0 - no cognition issues reported   Fall history within last six months no   Which of the above functional risks had a recent onset or change? ambulation;transferring;toileting;bathing;dressing   Prior Functional Level Comment OT: Prior to this admission pt was working FT, fully independent with BADl and IADL.     General Information   Onset of Illness/Injury or Date of Surgery - Date 05/29/17   Referring Physician Elicia   Patient/Family Goals Statement To get home and take  care of myself   Additional Occupational Profile Info/Pertinent History of Current Problem Extensive chart review completed as pt has been hospitalized for 2 months.  Pt is 55 y/o female with PMH significant for Afib/Aflutter/SVT, saddle anesthsia, urinary incontience and RA. Pt admitted 5/29 after cardiac arrest and remained in hospital until 6/15 (extubated 6/4).  Pt then transferred to ARU for 4 days then developed unexplained fevers and was transferred back to [facility].  Ultimately pt was diagnoses with diffuse large B  cell lymphoma stage IV with bone/liver involvement.  Pt is not s/p chemotherapy.  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  Pt unclear if she will be able return  to work.  Pt also has 4 more rounds of chemo.  Pt will return home with support from her  and and sister (who lives nearby) however both supports work FT.    Due to this extensive hospitalization pt is currently limited by decreased endurance, weakness throughout though distal >proximal and left >right, new memory deficits.  Pt will benefit from skilled OT to assist with dispositio planning, teach compensatory strategies, and regain strength and endurance to return home independent with BADL, but anticipating assistance with IADL.   Cognitive Status Examination   Orientation orientation to person, place and time   Level of Consciousness alert   Cognitive Comment OT: Pt endorses difficulty remembering new information   Visual Perception   Visual Acuity (wears glasses)   Visual Perception Comments OT: Pt reported she is due for an eye exam. Pt wears bifocls.   Sensory Examination   Sensory Comments saddle anesthsia   Range of Motion (ROM)   ROM Comment RIght wrist partial fusion and b/l great fusion   Strength   Strength Comments gross weakness, distal >proximal and right strength >left strength   Hand Strength   Left hand  (pounds) 10 pounds   Right hand  (pounds) 25 pounds   Coordination   Upper Extremity Coordination Left UE impaired   Left hand, nine hole peg test (seconds) 38   Right hand, nine hole peg test (seconds) 26   ARC Assessment Only   Acute Rehab FIM See FIM scores for Mobility/ADL Assessment   Clinical Impression   Criteria for Skilled Therapeutic Interventions Met yes, treatment indicated   OT Diagnosis below baseline ADL function

## 2017-08-05 NOTE — PROGRESS NOTES
SPIRITUAL HEALTH SERVICES  SPIRITUAL ASSESSMENT Progress Note  [facility] ([facility]) ARU  ON-CALL VISIT    REFERRAL SOURCE: Request upon admission     Shared prayer and spiritual care.    PLAN: Amelia and  will notify nurse if they have a need for a .    Kalpana Molina   Intern  Pager [phone]

## 2017-08-05 NOTE — PLAN OF CARE
Problem: Goal/Outcome  Goal: Goal Outcome Summary  FOCUS/GOAL  Medical management     ASSESSMENT, INTERVENTIONS AND CONTINUING PLAN FOR GOAL:  Patient is alert and oriented and able to make her needs known. She transfers with one staff assist and use of a walker. This morning, it was reported that patient's WBC was 0.4 this morning. Repeat labs ordered with peripheral stick in lieu of PICC. Results the same. Patient placed on Protective (Neutropenic) Precautions at this time. Levaquin 250mg ordered daily for prophylaxis. Writer inquired if provider wanted Fibrinogen obtained again today, but provider declined. Platelets 10,000, K+ 4.0, and Mag 2.0, and all of which required replacement. Writer administered Potassium, 20meq and ordered re-draw for tomorrow morning per order. Writer inquired about oral magnesium supplement and new order noted for 400mg BID. Writer administered first dose this afternoon. Platelets administered this afternoon as well. No reaction noted. Patient's vital signs stable throughout, but /68 after infusion. 30 minutes later, /72. Blood glucose levels today were 157 and 154. Patient ate 100% of breakfast and lunch. Huynh catheter draining clear, yellow urine without difficulty. Output 1,100cc this shift. No BM noted today. PICC dressing CDI. Purple lumen flushes without difficulty, blood return is noted, and it is Heparin locked. Nursing will continue to monitor.

## 2017-08-05 NOTE — PROGRESS NOTES
PM&R PROGRESS NOTE   Patient Name: Amelia Michel : 1960 Medical Record: [id number]         SUBJECTIVE/ INTERVAL CHANGES:     Amelia Michel was seen and examined at bedside. Pt had no acute events overnight. Pt endorses eating, sleeping and voiding well.  Pt denies any nausea,vomiting, F/C, chest pain, shortness of breath or changes in strength.     Current Facility-Administered Medications   Medication     senna-docusate (SENOKOT-S;PERICOLACE) 8.6-50 MG per tablet 1-2 tablet     furosemide (LASIX) tablet 40 mg     magnesium oxide (MAG-OX) tablet 400 mg     acetaminophen (TYLENOL) tablet 650 mg     acyclovir (ZOVIRAX) tablet 400 mg     allopurinol (ZYLOPRIM) tablet 300 mg     atenolol (TENORMIN) tablet 25 mg     calcium-vitamin D (CALTRATE) 600-400 MG-UNIT per tablet 2 tablet     cetirizine (zyrTEC) tablet 10 mg     digoxin (LANOXIN) tablet 125 mcg     filgrastim 15 mcg/mL (in Dextrose) (NEUPOGEN) infusion 300 mcg     fluconazole (DIFLUCAN) tablet 200 mg     folic acid (FOLVITE) tablet 1 mg     gabapentin (NEURONTIN) capsule 200 mg     melatonin tablet 1-3 mg     morphine 0.1% in solosite topical gel 2 g     multivitamin, therapeutic with minerals (THERA-VIT-M) tablet 1 tablet     potassium chloride SA (K-DUR/KLOR-CON M) CR tablet 40 mEq     predniSONE (DELTASONE) tablet 5 mg     spironolactone (ALDACTONE) half-tab 12.5 mg     thiamine tablet 50 mg     traMADol (ULTRAM) half-tab 25 mg     glucose 40 % gel 15-30 g    Or     dextrose 50 % injection 25-50 mL    Or     glucagon injection 1 mg     insulin aspart (NovoLOG) inj (RAPID ACTING)     insulin aspart (NovoLOG) inj (RAPID ACTING)     naloxone (NARCAN) injection 0.1-0.4 mg     sodium chloride (PF) 0.9% PF flush 20 mL     heparin lock flush 10 UNIT/ML injection 5-10 mL     heparin lock flush 10 UNIT/ML injection 5-10 mL     sodium chloride (PF) 0.9% PF flush 10-20 mL     potassium chloride SA (K-DUR/KLOR-CON M) CR tablet 20-40 mEq     potassium  chloride (KLOR-CON) Packet 20-40 mEq     potassium chloride 10 mEq in 100 mL intermittent infusion     potassium chloride 10 mEq in 100 mL intermittent infusion with 10 mg lidocaine     potassium chloride 20 mEq in 50 mL intermittent infusion            OBJECTIVE:   Vital Signs:   Vitals:    08/04/17 1958 08/04/17 2343 08/05/17 0623 08/05/17 0900   BP: 144/71 161/62 179/70 154/68   BP Location: Right leg Left leg Left leg Left leg   Pulse: 66 58 64 64   Resp: 16 16 16 18   Temp: 96.5  F (35.8  C) 96.6  F (35.9  C) 97.1  F (36.2  C) 96.1  F (35.6  C)   TempSrc: Oral Axillary Oral Axillary   SpO2: 96% 97% 97% 97%   Weight:   62.8 kg (138 lb 6.4 oz)    Height:           General: Pt pleasant and cooperative, NAD  Neuro:  Alert and oriented to person place and time, moves all extremities volitionally  Cardio: Ext WWP, rate noted above, normal rythym  Pulm: Comfortable on room air, no accessory muscle use  Ext: No edema, WWP         IMPRESSION and PLAN:      Amelia Michel is a 56 year old woman with a history of VSD repair (1969), Rheumatoid arthritis, and a recent diagnosis of Afib/Aflutter/SVT, who initially was admitted to [facility] on 5/29 after Out of Hospital cardiac arrest she was admitted from 5/29-6/15, extubated 6/4. Admitted to ARU 6/15,  for ongoing rehabilitation following prolonged hospitalization while at ARU (6/15-6/19), pt developed worsening LUE wound pain and fever, and was admitted back to [facility] for further workup. Underwent extensive workup in setting of fever and progressive cytopenias, ultimately diagnosed with Diffuse Large B-Cell Lymphoma s.o chemotherapy. Admitted to ARU 8/4/2017 for ongoing medical management and aggressive rehabilitation.      Admission to acute inpatient rehab severe critical illness myopathy.    Impairment group code: 03.8        PT, and OT 90 minutes of each on a daily basis, in addition to rehab nursing and close management of physiatrist.       Impairment of ADL's: Noted to  have deficits in Left upper extremity fine motor coordination, rom, activity tolerance, coordination, safety and AE use. Currently CGA for grooming, min assist for bathing, max assist for dressing due to limited flexibility and strength.  Fatigues while completing standing ADLS, currently requiring seated position to complete.  Goal for independence with ADLS with assist from spouse as needed.      Impairment of mobility:  Noted to have deficits in strength, activity tolerance, ROM, functional mobility, and balance.  Ambulating 350 feet with FWW and CGA-SBA with tachycardia during mobility.  Ascending 3 stairs with min assist. Goals for independence with mobility.                          Cognitive impairment: Pt has cognitive impairment with short term memory limited concentration and inability to focus on written word and lengthy conversation. Consider to add 60 min daily SLP therapy for formal cognitive evaluation and treatment as indicated.        1. Medical Conditions     Diffuse Large B-Cell Lymphoma- Stage 4 with bone marrow and liver involvement. IPI 4. Per Oncology Possibly related to prior methotrexate.   - PET/CT 7/18 revealing for mediastinal and neck level 2A lymphadenopathy, extensive FDG-avid bony lesions, hepatosplenic FDG-avid lesions, pelvic lesions continuous with the uterus, and bilateral pleural effusions.    - Bone marrow biopsy 7/12: scattered plasma cells in perivascular clusters, with no definitive staining of B-cell population by CD5; on re-review, possibly consistent with intravascular large B-cell lymphoma, liver biopsy (7/21) consistent with DLBCL  - Completed Cycle 1 of dose adjusted R-EPOCH (Day 1=7/28/17). Tolerated well overall, (hypotension SOB with Rituxan).  -Continue daily Neupogen (starting D6, 8/2)  PPX  - continue Levaquin, Fluconazole  -follow up oncology 8/16  -seen by palliative team during hospitalization- will consult health psychology during ARU stay for ongoing  emotional support     Atrial fibrillation/flutter  Rate controlled with atenolol and digoxin. Currently in NSR with HR in the 50's.   -- No anticoagulation due to thrombocytopenia  - continue Digoxin 125 mcg daily.   - continue Atenolol 25 mg daily      Recent out of hospital cardiac arrest (5/29/17)- Etiology remains unclear, possibly related to AV prieto blocking agents.  -- She meets criteria for secondary prevention ICD. Placement is currently on hold given LUE extravasation wound, also pending treatment and prognosis of lymphoma.   -- EP discussed risk vs benefit of Lifevest, she would like to defer at this time  -- QTc 440 today  -- Plan to see Dr. Dickson (cardiology) in clinic in 2-3 months     Anasarca, volume overload- She remains hypervolemic on exam and is 9 lbs above EDW of 135 lbs  - trend BMP  -Lasix 40 mg BID today   - 2g Na limit  - Continue spironolactone 12.5mg   - monitor daily weights  -monitor I&O     Hyperuricemia- Uric acid up to 8.2 (7/27 PM), s/p Rasburicase. Uric acid improved to 4.2.   - Continue Allopurinol.   -monitor mag/phos/uric acid     DIC. INR prolonged, now improving. received Vitamin K 10 mg PO x 3 days (7/28-7/30)  - conditional transfusion parameters in place: 2U plasma for INR >1.8, 5U cryo for fibrinogen <150.   - coag monitoring to q MWF x 4 occurrences      Pancytopenia, secondary to DLBCL  - transfuse to maintain Hgb >8.0 (keep given recent cardiac history), platelets >10,000  -Neupogen daily as above.   -trend CBC     Bradycardia- HR 50's. On tele strips and formal EKG 7/29 this appeared to be sinus shelton. Decreased atenolol   -monitor        Saddle anesthesia, urinary incontinence. Pt reports this is baseline for her. Unclear etiology. She has been noted to have an S1 radiculopathy. Seen by Neuro( Dr. Bhatia) dated 8/1 for details   -Suggested urodynamic study on outpatient basis  -plan to investigate CNS involvement for lymphoma      Rheumatoid arthritis -History of  seropositive rheumatoid arthritis (anti-CCP positive) since late 1980;s w/ multiple MTP osteotomies, partial right wrist fusion, longstanding therapy consisting of MTX, prednisone and HCQ  - Tapered to 5 mg prednisone on 7/17 held during stay resumed- per oncology-  5mg daily starting 8/4  - Follow-up with [facility] Rheumatology clinic Dr. Conor Cunha in 4-6 weeks after discharge      Steroid induced hyperglycemia. Added QID BG check and med SSI.       Extravasation-related LUE wound. Slowly improving.   - WOCN consult placed  -wound care per orders        Hypokalemia. 2/2 lasix. Started PO supplement in addition to sliding scale (currently high given cardiac issues/acute illness).   - Continue  to trend bmp  -continue scheduled 40 mEq potassium     Aortic masses. BRE on 7/14 showed 3 small masses on the coronary cusps and LVOT. Do not believe this to be endocarditis; no antibiotics at this time. Unclear what this represents; per cardiology- possibly could be Bernadinemann-Sacks.  -follow per cardiology      Nausea, mild.   - will now have scheduled antiemetic (Zofran, Emend)) with chemo, PRN Compazine/Ativan      LFT derangement- 2/2 lymphoma involvement s/p liver biopsy 7/21. ALT/AST significantly improved from prior. Alk phos more elevated 232, , AST 75 Bilirubin 0.6  -continue to trend        Hyponatremia-  in context of hypervolemia and ongoing diuresis  - continue to trend.      2. Adjustment to disability:  Health psychology consult placed  3. FEN: 2 gram NA diet  4. Bowel: incontinent at times  5. Bladder: chronic incontinence, currently has brand catheter   6. DVT Prophylaxis: mechanical (thrombocytopenia)  7. GI Prophylaxis: ? Start PPI  8. Code: full  9. Disposition: home  10. ELOS:  10-14.  11. Rehab prognosis:  good  12. Follow up Appointments on Discharge: f/u cardiology (2-3 months) , rheumatology (4/6 weeks), oncology 8/16.       Fatuma Murray Resident Physician  Physical Medicine and  Rehabilitation Service  Pt discussed with Dr. Shanks  Electronically signed by: Fatuma Murray, 8/5/2017, 11:17 AM       Attending's note   Patient has been seen, examined and evaluated by me independently. I reviewed today's vitals signs, lab values, imaging, medications, and current issues including medical, functional and social history significant to this admission.       I agree with the above note which reflects my direct input and interpretation of progress.   Amelia Michel is a 56 year old female, well known to me from previous consult, who is admitted in the ARU for rehabilitation for deconditioning/debility for Diffuse Large B-Cell Lymphoma, ' saddle anestesia' , urinary incontinence and paresthesias.   She will be receiving Platelets today   Evaluated by therapy team, ELOS is 2 weeks. She has significant proximal muscle weakness.   Total of 25 min spent in this encounter, more than 50% in counseling and coordination activities. Case was discussed with the resident         Priscilla Shanks MD

## 2017-08-05 NOTE — H&P
Post Admission Physician Evaluation:    I have compared Amelia Lynch condition on admission to acute rehabilitation to that outlined in the preadmission screen. History and physical exam performed by me. No significant differences are identified and the patient remains appropriate for an inpatient rehabilitation facility level of care to manage medical issues and address functional impairments due to (rehabilitation diagnosis) critical illness myopathy/polyneuropathy with severe debility due to prolonged and complicated hospital course.    Comorbid medical conditions being managed: DLBCL s/p chemo with resultant pancytopenia, significant cardiac history,  RA, wounds, previous h/o radiculopathy with neurogenic bladder/bowel     Prior functional level: she was completely I before her cardiac arrest    Present function: CGA to min A for ADLs and mobility, poor activity tolerance     Anticipated rehabilitation course: improvement to mod I level with mobility; will need set-up for some ADLs and definitely A for iADLs    Will benefit from intensive rehabilitation includin minutes each of PT and OT; will add SLP pending OT eval for formal evaluation of her cognition given the possibility of hypoxic/hypoperfusion brain injury     Rehabilitation nursing  Close management by physiatry    Prognosis: good rehab potentials for return to home with her ; medically complicated but overall stable    Estimated length of stay: 10-14 days    Mary Babcock MD  Physical Medicine & Rehabilitation

## 2017-08-06 LAB
CREAT SERPL-MCNC: 0.51 MG/DL (ref 0.52–1.04)
DIFFERENTIAL METHOD BLD: ABNORMAL
ERYTHROCYTE [DISTWIDTH] IN BLOOD BY AUTOMATED COUNT: 23.3 % (ref 10–15)
GFR SERPL CREATININE-BSD FRML MDRD: ABNORMAL ML/MIN/1.7M2
GLUCOSE BLDC GLUCOMTR-MCNC: 123 MG/DL (ref 70–99)
GLUCOSE BLDC GLUCOMTR-MCNC: 134 MG/DL (ref 70–99)
GLUCOSE BLDC GLUCOMTR-MCNC: 162 MG/DL (ref 70–99)
GLUCOSE BLDC GLUCOMTR-MCNC: 96 MG/DL (ref 70–99)
HCT VFR BLD AUTO: 24.8 % (ref 35–47)
HGB BLD-MCNC: 8.2 G/DL (ref 11.7–15.7)
MAGNESIUM SERPL-MCNC: 2 MG/DL (ref 1.6–2.3)
MCH RBC QN AUTO: 29.9 PG (ref 26.5–33)
MCHC RBC AUTO-ENTMCNC: 33.1 G/DL (ref 31.5–36.5)
MCV RBC AUTO: 91 FL (ref 78–100)
PLATELET # BLD AUTO: 11 10E9/L (ref 150–450)
POTASSIUM SERPL-SCNC: 4 MMOL/L (ref 3.4–5.3)
RBC # BLD AUTO: 2.74 10E12/L (ref 3.8–5.2)
WBC # BLD AUTO: 0.3 10E9/L (ref 4–11)

## 2017-08-06 PROCEDURE — 83735 ASSAY OF MAGNESIUM: CPT | Performed by: INTERNAL MEDICINE

## 2017-08-06 PROCEDURE — 97116 GAIT TRAINING THERAPY: CPT | Mod: GP

## 2017-08-06 PROCEDURE — 40000133 ZZH STATISTIC OT WARD VISIT: Performed by: OCCUPATIONAL THERAPIST

## 2017-08-06 PROCEDURE — 25000132 ZZH RX MED GY IP 250 OP 250 PS 637: Performed by: INTERNAL MEDICINE

## 2017-08-06 PROCEDURE — 00000146 ZZHCL STATISTIC GLUCOSE BY METER IP

## 2017-08-06 PROCEDURE — 83735 ASSAY OF MAGNESIUM: CPT | Performed by: PHYSICAL MEDICINE & REHABILITATION

## 2017-08-06 PROCEDURE — 25000125 ZZHC RX 250: Performed by: INTERNAL MEDICINE

## 2017-08-06 PROCEDURE — 97530 THERAPEUTIC ACTIVITIES: CPT | Mod: GP

## 2017-08-06 PROCEDURE — 97535 SELF CARE MNGMENT TRAINING: CPT | Mod: GO | Performed by: OCCUPATIONAL THERAPIST

## 2017-08-06 PROCEDURE — 36592 COLLECT BLOOD FROM PICC: CPT | Performed by: INTERNAL MEDICINE

## 2017-08-06 PROCEDURE — 99233 SBSQ HOSP IP/OBS HIGH 50: CPT | Performed by: INTERNAL MEDICINE

## 2017-08-06 PROCEDURE — 40000193 ZZH STATISTIC PT WARD VISIT

## 2017-08-06 PROCEDURE — 97110 THERAPEUTIC EXERCISES: CPT | Mod: GP

## 2017-08-06 PROCEDURE — 97750 PHYSICAL PERFORMANCE TEST: CPT | Mod: GP

## 2017-08-06 PROCEDURE — 99207 ZZC CDG-MDM COMPONENT: MEETS MODERATE - UP CODED: CPT | Performed by: INTERNAL MEDICINE

## 2017-08-06 PROCEDURE — 85027 COMPLETE CBC AUTOMATED: CPT

## 2017-08-06 PROCEDURE — 84132 ASSAY OF SERUM POTASSIUM: CPT | Performed by: INTERNAL MEDICINE

## 2017-08-06 PROCEDURE — 12800006 ZZH R&B REHAB

## 2017-08-06 PROCEDURE — 25000128 H RX IP 250 OP 636: Performed by: PHYSICAL MEDICINE & REHABILITATION

## 2017-08-06 PROCEDURE — 25000128 H RX IP 250 OP 636: Performed by: INTERNAL MEDICINE

## 2017-08-06 PROCEDURE — 82565 ASSAY OF CREATININE: CPT | Performed by: INTERNAL MEDICINE

## 2017-08-06 RX ORDER — MAGNESIUM OXIDE 400 MG/1
400 TABLET ORAL 2 TIMES DAILY
Status: COMPLETED | OUTPATIENT
Start: 2017-08-06 | End: 2017-08-06

## 2017-08-06 RX ORDER — FUROSEMIDE 20 MG
20 TABLET ORAL 2 TIMES DAILY
Status: DISCONTINUED | OUTPATIENT
Start: 2017-08-06 | End: 2017-08-07

## 2017-08-06 RX ADMIN — SODIUM CHLORIDE, PRESERVATIVE FREE 5 ML: 5 INJECTION INTRAVENOUS at 20:11

## 2017-08-06 RX ADMIN — PREDNISONE 5 MG: 5 TABLET ORAL at 08:35

## 2017-08-06 RX ADMIN — CALCIUM CARBONATE 600 MG (1,500 MG)-VITAMIN D3 400 UNIT TABLET 2 TABLET: at 08:36

## 2017-08-06 RX ADMIN — SODIUM CHLORIDE, PRESERVATIVE FREE 5 ML: 5 INJECTION INTRAVENOUS at 08:25

## 2017-08-06 RX ADMIN — ACYCLOVIR 400 MG: 400 TABLET ORAL at 20:09

## 2017-08-06 RX ADMIN — FUROSEMIDE 20 MG: 20 TABLET ORAL at 08:41

## 2017-08-06 RX ADMIN — Medication 12.5 MG: at 08:36

## 2017-08-06 RX ADMIN — FOLIC ACID 1 MG: 1 TABLET ORAL at 08:36

## 2017-08-06 RX ADMIN — MULTIPLE VITAMINS W/ MINERALS TAB 1 TABLET: TAB at 08:36

## 2017-08-06 RX ADMIN — POTASSIUM CHLORIDE 40 MEQ: 750 TABLET, FILM COATED, EXTENDED RELEASE ORAL at 08:35

## 2017-08-06 RX ADMIN — DIGOXIN 125 MCG: 125 TABLET ORAL at 08:36

## 2017-08-06 RX ADMIN — GABAPENTIN 200 MG: 100 CAPSULE ORAL at 08:36

## 2017-08-06 RX ADMIN — ATENOLOL 25 MG: 25 TABLET ORAL at 08:36

## 2017-08-06 RX ADMIN — ALLOPURINOL 300 MG: 300 TABLET ORAL at 08:35

## 2017-08-06 RX ADMIN — SODIUM CHLORIDE, PRESERVATIVE FREE 5 ML: 5 INJECTION INTRAVENOUS at 05:46

## 2017-08-06 RX ADMIN — GABAPENTIN 200 MG: 100 CAPSULE ORAL at 13:23

## 2017-08-06 RX ADMIN — GABAPENTIN 200 MG: 100 CAPSULE ORAL at 20:09

## 2017-08-06 RX ADMIN — Medication 480 MCG: at 20:10

## 2017-08-06 RX ADMIN — POTASSIUM CHLORIDE 20 MEQ: 750 TABLET, FILM COATED, EXTENDED RELEASE ORAL at 08:46

## 2017-08-06 RX ADMIN — FLUCONAZOLE 200 MG: 200 TABLET ORAL at 08:36

## 2017-08-06 RX ADMIN — THIAMINE HCL (VITAMIN B1) 50 MG TABLET 50 MG: at 08:36

## 2017-08-06 RX ADMIN — LEVOFLOXACIN 250 MG: 250 TABLET, FILM COATED ORAL at 08:42

## 2017-08-06 RX ADMIN — MAGNESIUM OXIDE TAB 400 MG (241.3 MG ELEMENTAL MG) 400 MG: 400 (241.3 MG) TAB at 13:23

## 2017-08-06 RX ADMIN — MAGNESIUM OXIDE TAB 400 MG (241.3 MG ELEMENTAL MG) 400 MG: 400 (241.3 MG) TAB at 20:09

## 2017-08-06 RX ADMIN — ACYCLOVIR 400 MG: 400 TABLET ORAL at 08:35

## 2017-08-06 NOTE — PROGRESS NOTES
[facility], [place]   Hospitalist Daily Progress Note                                                 Date of Admission:2017  ___________________________________  INTERVAL HISTORY (24 Hrs)/SUBJECTIVE:   Last 24 hr events, care team notes reviewed.     Doing well  No new s/s  No fever or chills  No obvious bleeding or new bruising.   No cp or sob.   Diuresing well.     ROS: 4 point ROS neg other than the symptoms noted above in the interval history.    OBJECTIVE :   VITALS:    Temp:  [95.6  F (35.3  C)-96.4  F (35.8  C)] 95.9  F (35.5  C)  Pulse:  [54-65] 65  Resp:  [16-18] 18  BP: (136-174)/(51-79) 136/51  SpO2:  [95 %-97 %] 95 %     Temp (24hrs), Av.9  F (35.5  C), Min:95.3  F (35.2  C), Max:96.4  F (35.8  C)      Wt Readings from Last 5 Encounters:   17 58 kg (127 lb 14.4 oz)   17 66 kg (145 lb 9.6 oz)   17 75.7 kg (166 lb 12.8 oz)   17 72.8 kg (160 lb 8 oz)   17 64.4 kg (141 lb 15.6 oz)        Intake/Output Summary (Last 24 hours) at 17 1200  Last data filed at 17 0600   Gross per 24 hour   Intake              300 ml   Output             6750 ml   Net            -6450 ml         PHYSICAL EXAM:  General: alert, interactive, NAD  HEENT: AT/NC,  Moist MM  Respi/Chest: Non labored.cta bl  CVS/Heart: S1S2 regular,   Gi/Abd: Soft, non tender, non distended,   MSK/Ext: Distal pulses 2+.  bl 2+ pedal edema upto thighs.   Neuro: AO x 4, generalized physical deconditioning. Moving all extremities.      Medications:   I have reviewed this patient's current medications.    Data:   All laboratory and imaging data in the past 24 hours reviewed:    LABS:  CMP  Recent Labs  Lab 17  0547 17  0659 17  1605 17  0204 17  1827 17  0309  17  0402  17  0207   NA  --  134 131* 131* 132* 135  < > 139  --  140   POTASSIUM 4.0 4.0 4.6 3.8 4.5 4.2  < > 2.9*  < > 3.1*   CHLORIDE  --  90* 90* 89* 90* 94  < > 96  --   98   CO2  --  34* 33* 36* 36* 36*  < > 35*  --  32   ANIONGAP  --  10 8 6 6 5  < > 9  --  10   GLC  --  159* 223* 233* 245* 199*  < > 191*  --  230*   BUN  --  33* 34* 34* 37* 30  < > 27  --  30   CR  --  0.50* 0.53 0.51* 0.56 0.54  < > 0.50*  --  0.54   GFRESTIMATED  --  >90Non  GFR Calc >90Non  GFR Calc >90Non  GFR Calc >90Non  GFR Calc >90Non  GFR Calc  < > >90Non  GFR Calc  --  >90Non  GFR Calc   GFRESTBLACK  --  >90African American GFR Calc >90African American GFR Calc >90African American GFR Calc >90African American GFR Calc >90African American GFR Calc  < > >90African American GFR Calc  --  >90African American GFR Calc   VIKTORIYA  --  8.8 8.4* 8.5 8.2* 8.2*  < > 7.6*  --  7.5*   MAG  --  2.0  --  2.2 2.1 2.4*  < > 2.0  < > 1.9   PHOS  --   --   --  3.1  --  2.3*  --  2.9  --  2.3*   PROTTOTAL  --  6.1*  --  5.9*  --  5.7*  --  5.7*  < >  --    ALBUMIN  --  3.3*  --  3.1*  --  2.9*  --  2.8*  < >  --    BILITOTAL  --  1.1  --  1.2  --  1.2  --  1.3  < >  --    ALKPHOS  --  226*  --  232*  --  234*  --  261*  < >  --    AST  --  69*  --  75*  --  69*  --  82*  < >  --    ALT  --  119*  --  108*  --  91*  --  86*  < >  --    < > = values in this interval not displayed.  CBC    Recent Labs  Lab 08/06/17  0547 08/05/17  1012 08/05/17  0659 08/04/17  0204   WBC 0.3* 0.4* 0.4* 1.6*   RBC 2.74* 2.92* 2.80* 2.75*   HGB 8.2* 8.5* 8.3* 8.1*   HCT 24.8* 26.5* 25.3* 25.2*   MCV 91 91 90 92   MCH 29.9 29.1 29.6 29.5   MCHC 33.1 32.1 32.8 32.1   RDW 23.3* 23.5* 23.6* 24.7*   PLT 11* 10* 10* 15*     INR    Recent Labs  Lab 08/04/17  0204 08/02/17  0402 07/31/17  0331   INR 1.42* 1.44* 1.36*     Unresulted Labs Ordered in the Past 30 Days of this Admission     Date and Time Order Name Status Description    7/28/2017 0240 Blood culture Preliminary     7/28/2017 0210 Blood culture Preliminary     7/28/2017 0115 Transfusion  reaction evaluation In process     2017 0115 Transfusion reaction evaluation In process           EK/4: Sinus shelton, HR 55, QTc 440  Echo 7/10/17:  Technically difficult study. Limited study.  Borderline (EF 50-55%) reduced left ventricular function is present.  Global right ventricular function is mildly reduced.  Moderate tricuspid insufficiency is present. This is unchanged from prior, and  was determined to be from prolapse on prior BRE.  No vegetations seen on this challenging study    ASSESSMENT & PLAN :    Amelia Michel is a 56 year old female with complex past medical history of prior VSD repair, rheumatoid arthritis on long-term methotrexate, recent dx atrial fibrillation/flutter/SVT who was recently admitted to [facility] on  after an out of hospital cardiac arrest thought to be related to AV prieto blocking agents (angio normal). She made a full recovery and was discharged to rehab on 6/15 with a lifevest. Readmitted from rehab 17 for FUO & afib w rvr. With workup of fever and progressive cytopenias, diagnosis of DLBCL was made. Transferred to the Hematology service on . s/p Cycle 1 R-EPOCH.      Transferred to ARU 2017 for her ongoing rehab needs and other cares  Medicine consulted for medical co-management.      HEME/ONC:       # Diffuse Large B-Cell Lymphoma. Stage 4 with bone marrow and liver involvement. IPI 4. Possibly related to prior methotrexate. PET/CT  revealing for mediastinal and neck level 2A lymphadenopathy, extensive FDG-avid bony lesions, hepatosplenic FDG-avid lesions, pelvic lesions contingous with the uterus, and bilateral pleural effusions. Bone marrow biopsy completed on  showed scattered plasma cells in perivascular clusters, with no definitive staining of B-cell population by CD5; on re-review, possibly consistent with intravascularge large B-cell lymphoma. However, liver biopsy () consistent with DLBCL, negative for BCL2/BCL6/MYC rearrangements,  CD20 positive. PICC line placed and kept in place on discharge. Completed Cycle 1 of dose adjusted R-EPOCH (Day 1=7/28/17). Tolerated well overall, except for reaction to Rituxan (hypotension with BP 87/45, SOB/feeling of ?throat swelling/difficulty getting breath in; then fever/rigors).   Last dose 08/01.     - patient with worsening neutropenia wbc: 0.4 ->0.3, plt 10K 8/5/2017 s/p 1 U plt ->11 8/6/2017  With no fever or new focal infectious s/s. No bleeding or bruising.     - Transfusion goals:  Transfuse to maintain Hgb >8.0, platelets >=10,000. (can increase platelet goals to 20 K for new bruising). RN to make therapy team aware of low counts.   - Continue daily Neupogen until counts begin to recover and no longer neutropenic (ANC >1000).   - daily CBC w diff for now.   - Neutropenic precautions.     8/6/2017  D/w Dr Ospina (hem/onc),   Rec: Increase neupogen 480 mcg daily.   No new empiric antibiotics for now.  If febrile: Consider infectious work up including BC x 2 sites. UA. UC and start empirically on iv Cefepime for any fever and consider transferring back to [facility]  Continue Prophylactic antibiotics as below.         ID:   # PPX. continue ppx ACV, Levaquin,  Fluconazole.  #Persistent Fevers. -- Resolved. Likely related to lymphoma.   #Lactic acidosis. -- Resolved.     Extensive infectious evaluation including blood cultures, which remain NGTD. Additionally, stool virus panel, M. Tuberculsosis, tick-borne illnesses, and bartonella/Q-fever have all been negative. Ultimately, fevers/lactate thought secondary to malignancy. Possible pneumonia while recently on meropenem (7/1-7/5); Transient rise in lactic acid during Rituxan infusion reaction (7/28).          Cardiovascular: prior vsd repair 1969  Patient followed closely and co managed by cardiology team while inpatient.      # Volume status, anasarca:   Patient diuresing a lot last 24 hours.     Intake/Output Summary (Last 24 hours) at 08/06/17 1204  Last data  filed at 08/06/17 0600   Gross per 24 hour   Intake              300 ml   Output             6750 ml   Net            -6450 ml     Wt down.   Lytes acceptable.   Patient asymptomatic.   VSS    - 8/6/2017  Decrease lasix 20 mg bid, hold evening dose of lasix if UOP >2L during the day.   Continue spironolactone 12.5 daily.   K, mg protocol.    -- I/O, frequent BMP.  - daily wt.      # Paroxysmal a-fib/a flutter: Rate controlled with current atenolol 25 mg daily and digoxin 125 mcg daily. Currently in NSR with HR in the 50's  Cardiology team comfortable with her current HR.   -- Continue current regimen  -- No anticoagulation due to thromboycytopenia         # S/P recent out of hospital cardiac arrest (05/29/17): Etiology remains unclear, possibly related to AV prieto blocking agents.  -- She meets criteria for secondary prevention ICD. Placement is currently on hold given LUE extravasation wound, also pending treatment and prognosis of lymphoma.   -- EP discussed risk vs benefit of Lifevest, she would like to defer at this time  -- QTc 440 (08/04)  -- K, Mg protocol (keep above 4 and 2)  -- Plan to see Dr. Dickson in clinic in 2-3 months         GI:   #LFT derangement. 2/2 lymphoma involvement, recent chemo. Asymptomatic.   - Monitor LFT twice weekly at ARU     # Bowel regimen PRN      NEURO:  # 'saddle anesthesia' , urinary incontinence and paresthesias.    seen by Neurology in hospital  Rec:   She would benefit with a urodynamic study by Urology to know the pattern of her incontinence (detrusor hyperactivity X overflow) as an outpatient basis.   ?CSF studies to investigate CNS involvement in lymphoma. She refers that it is in the plan of the primary team in the future.        RHEUM:   #Rheumatoid arthritis   History of seropositive rheumatoid arthritis (anti-CCP positive) since late 1980;s w/ multiple MTP osteotomies, partial right wrist fusion, longstanding therapy consisting of MTX, prednisone and HCQ. Tapered to  5 mg prednisone on 7/17 with continued monitoring for flaring. Prednisone was held with higher dose steroids during treatment plan. Resumed 5mg dose as of 8/4.   -- Follow-up with [facility] Rheumatology clinic Dr. Conor Cunha in 4-6 weeks after discharge      ENDO:  #Steroid induced hyperglycemia. Continue med SSI. Monitor BS.       Recent Labs  Lab 08/05/17  2126 08/05/17  1743 08/05/17  1258 08/05/17  0824 08/05/17  0659 08/04/17  2120 08/04/17  1714 08/04/17  1605  08/04/17  0204  08/03/17  1827  08/03/17  0309  08/02/17  1606   GLC  --   --   --   --  159*  --   --  223*  --  233*  --  245*  --  199*  --  297*   * 164* 154* 157*  --  316* 219*  --   < >  --   < >  --   < >  --   < >  --    < > = values in this interval not displayed.    DERM:   #Extravasation-related LUE wound. Slowly improving.  - Continue wound care  - WOCN consult.      Electrolytes abnormality:   Hypokalemia: 2/2 lasix. On po supplement. Monitor and replace as needed. Keep >4  Mg: keep above 2.0. MgO 400 x 2.   Hyponatremia: 131 -> 134     Severe Physical Deconditioning. 2/2 prolonged hospital stays, acute illness/malignancy.   - Therapy per PM & R team.         - follow up at oncology clinic 8/16         Thank you for this interesting consultation.    Will fu.   Please page with any Q     D/w RN. PM&R team.      Johnie Chino MD   Hospitalist (Internal Medicine)  Pager: [phone].

## 2017-08-06 NOTE — PLAN OF CARE
Problem: Goal/Outcome  Goal: Goal Outcome Summary  Outcome: No Change  FOCUS/GOAL  Bladder management and Mobility     ASSESSMENT, INTERVENTIONS AND CONTINUING PLAN FOR GOAL:  Pt sleeping well through the night, and is able to reposition her self from side to side, with nurse placing pillows for her. Pt has brand catheter for bladder management due to retention. Catheter has been draining large volume light yellow urine. Pt denies having any pain. Patient is on contact and neutropenic precautions.

## 2017-08-06 NOTE — PROGRESS NOTES
PM&R PROGRESS NOTE   Patient Name: Amelia Michel : 1960 Medical Record: [id number]         SUBJECTIVE/ INTERVAL CHANGES:     Amelia Michel was seen and examined at bedside. Pt had no acute events overnight. Pt endorses eating, sleeping and voiding well.  Pt denies any nausea,vomiting, F/C, chest pain, shortness of breath or changes in strength. Pt voided over 8.5 liters over 24 hour period yesterday. She does not appear dehydrated eyes and oral mucosa look good. LE edema is much improved compared to yesterday. Hospitalist aware Lasix adjusted.     Current Facility-Administered Medications   Medication     furosemide (LASIX) tablet 20 mg     filgrastim 15 mcg/mL (in Dextrose) (NEUPOGEN) infusion 480 mcg     magnesium oxide (MAG-OX) tablet 400 mg     senna-docusate (SENOKOT-S;PERICOLACE) 8.6-50 MG per tablet 1-2 tablet     levofloxacin (LEVAQUIN) tablet 250 mg     acetaminophen (TYLENOL) tablet 650 mg     acyclovir (ZOVIRAX) tablet 400 mg     allopurinol (ZYLOPRIM) tablet 300 mg     atenolol (TENORMIN) tablet 25 mg     calcium-vitamin D (CALTRATE) 600-400 MG-UNIT per tablet 2 tablet     cetirizine (zyrTEC) tablet 10 mg     digoxin (LANOXIN) tablet 125 mcg     fluconazole (DIFLUCAN) tablet 200 mg     folic acid (FOLVITE) tablet 1 mg     gabapentin (NEURONTIN) capsule 200 mg     melatonin tablet 1-3 mg     morphine 0.1% in solosite topical gel 2 g     multivitamin, therapeutic with minerals (THERA-VIT-M) tablet 1 tablet     potassium chloride SA (K-DUR/KLOR-CON M) CR tablet 40 mEq     predniSONE (DELTASONE) tablet 5 mg     spironolactone (ALDACTONE) half-tab 12.5 mg     thiamine tablet 50 mg     traMADol (ULTRAM) half-tab 25 mg     glucose 40 % gel 15-30 g    Or     dextrose 50 % injection 25-50 mL    Or     glucagon injection 1 mg     insulin aspart (NovoLOG) inj (RAPID ACTING)     insulin aspart (NovoLOG) inj (RAPID ACTING)     naloxone (NARCAN) injection 0.1-0.4 mg     sodium chloride (PF) 0.9%  PF flush 20 mL     heparin lock flush 10 UNIT/ML injection 5-10 mL     heparin lock flush 10 UNIT/ML injection 5-10 mL     sodium chloride (PF) 0.9% PF flush 10-20 mL     potassium chloride SA (K-DUR/KLOR-CON M) CR tablet 20-40 mEq     potassium chloride (KLOR-CON) Packet 20-40 mEq     potassium chloride 10 mEq in 100 mL intermittent infusion     potassium chloride 10 mEq in 100 mL intermittent infusion with 10 mg lidocaine     potassium chloride 20 mEq in 50 mL intermittent infusion            OBJECTIVE:   Vital Signs:   Vitals:    08/05/17 1650 08/06/17 0556 08/06/17 0836 08/06/17 0947   BP: 156/68 136/51 171/83 159/81   BP Location: Left leg Left leg  Left leg   Pulse: 54 65 67 62   Resp: 16 18 18   Temp: 96.4  F (35.8  C) 95.9  F (35.5  C)  95.3  F (35.2  C)   TempSrc: Axillary Axillary  Axillary   SpO2: 96% 95%  95%   Weight:  58 kg (127 lb 14.4 oz)     Height:           General: Pt pleasant and cooperative, NAD  Neuro:  Alert and oriented to person place and time, moves all extremities volitionally  Cardio: Ext WWP, rate noted above, normal rythym  Pulm: Comfortable on room air, no accessory muscle use  Ext: No edema, WWP         IMPRESSION and PLAN:      Amelia Michel is a 56 year old woman with a history of VSD repair (1969), Rheumatoid arthritis, and a recent diagnosis of Afib/Aflutter/SVT, who initially was admitted to [facility] on 5/29 after Out of Hospital cardiac arrest she was admitted from 5/29-6/15, extubated 6/4. Admitted to ARU 6/15,  for ongoing rehabilitation following prolonged hospitalization while at ARU (6/15-6/19), pt developed worsening LUE wound pain and fever, and was admitted back to [facility] for further workup. Underwent extensive workup in setting of fever and progressive cytopenias, ultimately diagnosed with Diffuse Large B-Cell Lymphoma s.o chemotherapy. Admitted to ARU 8/4/2017 for ongoing medical management and aggressive rehabilitation.      Admission to acute inpatient rehab severe  critical illness myopathy.    Impairment group code: 03.8        PT, and OT 90 minutes of each on a daily basis, in addition to rehab nursing and close management of physiatrist.       Impairment of ADL's: Noted to have deficits in Left upper extremity fine motor coordination, rom, activity tolerance, coordination, safety and AE use. Currently CGA for grooming, min assist for bathing, max assist for dressing due to limited flexibility and strength.  Fatigues while completing standing ADLS, currently requiring seated position to complete.  Goal for independence with ADLS with assist from spouse as needed.      Impairment of mobility:  Noted to have deficits in strength, activity tolerance, ROM, functional mobility, and balance.  Ambulating 350 feet with FWW and CGA-SBA with tachycardia during mobility.  Ascending 3 stairs with min assist. Goals for independence with mobility.                          Cognitive impairment: Pt has cognitive impairment with short term memory limited concentration and inability to focus on written word and lengthy conversation. Consider to add 60 min daily SLP therapy for formal cognitive evaluation and treatment as indicated.        1. Medical Conditions     Diffuse Large B-Cell Lymphoma- Stage 4 with bone marrow and liver involvement. IPI 4. Per Oncology Possibly related to prior methotrexate.   - PET/CT 7/18 revealing for mediastinal and neck level 2A lymphadenopathy, extensive FDG-avid bony lesions, hepatosplenic FDG-avid lesions, pelvic lesions continuous with the uterus, and bilateral pleural effusions.    - Bone marrow biopsy 7/12: scattered plasma cells in perivascular clusters, with no definitive staining of B-cell population by CD5; on re-review, possibly consistent with intravascular large B-cell lymphoma, liver biopsy (7/21) consistent with DLBCL  - Completed Cycle 1 of dose adjusted R-EPOCH (Day 1=7/28/17). Tolerated well overall, (hypotension SOB with Rituxan).  -Continue  daily Neupogen (starting D6, 8/2)  PPX  - continue Levaquin, Fluconazole  -follow up oncology 8/16  -seen by palliative team during hospitalization- will consult health psychology during ARU stay for ongoing emotional support     Atrial fibrillation/flutter  Rate controlled with atenolol and digoxin. Currently in NSR with HR in the 50's.   -- No anticoagulation due to thrombocytopenia  - continue Digoxin 125 mcg daily.   - continue Atenolol 25 mg daily      Recent out of hospital cardiac arrest (5/29/17)- Etiology remains unclear, possibly related to AV prieto blocking agents.  -- She meets criteria for secondary prevention ICD. Placement is currently on hold given LUE extravasation wound, also pending treatment and prognosis of lymphoma.   -- EP discussed risk vs benefit of Lifevest, she would like to defer at this time  -- QTc 440 today  -- Plan to see Dr. Dickson (cardiology) in clinic in 2-3 months     Anasarca, volume overload- She remains hypervolemic on exam and is 9 lbs above EDW of 135 lbs  - trend BMP  -Lasix 40 mg BID today   - 2g Na limit  - Continue spironolactone 12.5mg   - monitor daily weights  -monitor I&O     Hyperuricemia- Uric acid up to 8.2 (7/27 PM), s/p Rasburicase. Uric acid improved to 4.2.   - Continue Allopurinol.   -monitor mag/phos/uric acid     DIC. INR prolonged, now improving. received Vitamin K 10 mg PO x 3 days (7/28-7/30)  - conditional transfusion parameters in place: 2U plasma for INR >1.8, 5U cryo for fibrinogen <150.   - coag monitoring to q MWF x 4 occurrences      Pancytopenia, secondary to DLBCL  - transfuse to maintain Hgb >8.0 (keep given recent cardiac history), platelets >10,000  -Neupogen daily as above.   -trend CBC     Bradycardia- HR 50's. On tele strips and formal EKG 7/29 this appeared to be sinus shelton. Decreased atenolol   -monitor        Saddle anesthesia, urinary incontinence. Pt reports this is baseline for her. Unclear etiology. She has been noted to have an  S1 radiculopathy. Seen by Neuro( Dr. Bhatia) dated 8/1 for details   -Suggested urodynamic study on outpatient basis  -plan to investigate CNS involvement for lymphoma      Rheumatoid arthritis -History of seropositive rheumatoid arthritis (anti-CCP positive) since late 1980;s w/ multiple MTP osteotomies, partial right wrist fusion, longstanding therapy consisting of MTX, prednisone and HCQ  - Tapered to 5 mg prednisone on 7/17 held during stay resumed- per oncology-  5mg daily starting 8/4  - Follow-up with [facility] Rheumatology clinic Dr. Conor Cunha in 4-6 weeks after discharge      Steroid induced hyperglycemia. Added QID BG check and med SSI.       Extravasation-related LUE wound. Slowly improving.   - WOCN consult placed  -wound care per orders        Hypokalemia. 2/2 lasix. Started PO supplement in addition to sliding scale (currently high given cardiac issues/acute illness).   - Continue  to trend bmp  -continue scheduled 40 mEq potassium     Aortic masses. BRE on 7/14 showed 3 small masses on the coronary cusps and LVOT. Do not believe this to be endocarditis; no antibiotics at this time. Unclear what this represents; per cardiology- possibly could be Libmann-Sacks.  -follow per cardiology      Nausea, mild.   - will now have scheduled antiemetic (Zofran, Emend)) with chemo, PRN Compazine/Ativan      LFT derangement- 2/2 lymphoma involvement s/p liver biopsy 7/21. ALT/AST significantly improved from prior. Alk phos more elevated 232, , AST 75 Bilirubin 0.6  -continue to trend        Hyponatremia-  in context of hypervolemia and ongoing diuresis  - continue to trend.      2. Adjustment to disability:  Health psychology consult placed  3. FEN: 2 gram NA diet  4. Bowel: incontinent at times  5. Bladder: chronic incontinence, currently has brand catheter   6. DVT Prophylaxis: mechanical (thrombocytopenia)  7. GI Prophylaxis: ? Start PPI  8. Code: full  9. Disposition: home  10. ELOS:   10-14.  11. Rehab prognosis:  good  12. Follow up Appointments on Discharge: f/u cardiology (2-3 months) , rheumatology (4/6 weeks), oncology 8/16.       Fatuma Murray Resident Physician  Physical Medicine and Rehabilitation Service  Pt discussed with Dr. Shanks       Attending's note   Patient has been seen, examined and evaluated by me independently. I reviewed today's vitals signs, lab values, imaging, medications, and current issues including medical, functional and social history significant to this admission.       I agree with the above note which reflects my direct input and interpretation of progress.   Amelia Michel is a 56 year old female who in the ARU for rehabilitation for  for deconditioning/debility for Diffuse Large B-Cell Lymphoma, ' saddle anestesia' , urinary incontinence and paresthesias.   Trial of bladder function assessment following removal of brand catheter. Plan to do so after weaning the lasix.   She is agreeable tot he plan of care     Priscilla Shanks MD

## 2017-08-06 NOTE — PROGRESS NOTES
08/06/17 0900   Signing Clinician's Name / Credentials   Signing clinician's name / credentials Amrita Lomeli DPFRANCESCA   Dixon Balance Scale (DIXON CRUZ, JUAN S, ANABEL CACERES, ANGELITA ANSARI: MEASURING BALANCE IN THE ELDERLY: VALIDATION OF AN INSTRUMENT. CAN. J. PUB. HEALTH, JULY/AUGUST SUPPLEMENT 2:S7-11, 1992.)   Sit To Stand 3   Standing Unsupported 3   Sitting Unsupported 4   Stand to Sit 2   Transfers 3   Standing with Eyes Closed 3   Standing Unsupported, Feet Together 3   Reach Forward With Outstretched Arm 1   Retrieve Object From Floor 3   Turning to Look Behind 4   Turn 360 Degrees 1   Placing Alternate Foot on Stool (4-6 inches) 1   Unsupported Tandem Stand (Demonstrate to Subject) 0   One Leg Stand 0   Total Score (A score of 45 or less has been correlated with an increased risk of falls)   Total Score (out of 56) 31     Dixon Balance Scale (BBS) Cutoff Scores: A score of ? 45/56 indicates an increased risk for falls.   Patient Score: 31/56    The BBS is a measure of static and dynamic standing balance that has been validated in community dwelling elderly individuals and individuals who have Parkinson's Disease, MS, and those who are s/p CVA and TBI. The test is administered without an assistive device. Scores from the Dixon are used to determine the probability of falling based on the patient's previous history of falls and their test performance.     Minimal Detectable Change = 6.5   & Minimal Detectable Change (Parkinson's Disease) = 5 according to Ricardo & Randalley 2008    Assessment (rationale for performing, application to patient s function & care plan): Prox ms weakness, gait instability  Minutes billed as physical performance test: 10      PT: Pt engaged in balance assessment. Gait training w/ FWW and WC improved distance to 225' continusouly. Pt engaged in standing LE strengthening exercise w/i safe and tolerable range. Cont. POC

## 2017-08-06 NOTE — PLAN OF CARE
Problem: Goal/Outcome  Goal: Goal Outcome Summary  FOCUS/GOAL  Bowel management, Bladder management and Medical management     ASSESSMENT, INTERVENTIONS AND CONTINUING PLAN FOR GOAL:  Patient is alert and oriented and able to make her needs known. Transfers well with SBA and a walker. Patient's Potassium level 4.0 again today and Magnesium also 2.0 again today. Writer administered 20meq of Potassium per standing order. Noted new order for Magnesium 400mg BID today. Did not require administration of any blood products today. WBC 0.3; remains on Protective (Neutropenic) Precautions at this time. Dose of Neupogen increased, with first administration this evening. Hospitalist stated that patient should take it easy in therapy and staff to monitor for increased bruising. CBC lab changed to daily. Writer updated Hospitalist about patient's weight loss and urine output. Furosemide decreased to 20mg BID. He stated that staff should hold PM dose of Lasix if patient's output over a liter by this evening. Patient has had 900cc of anselmo, clear urine this shift. Huynh is draining without difficulty and Huynh cares completed. Patient has had multiple bowel movements today, some loose. Patient stated that she took an extra dose of Senna last evening. Blood glucose level this morning was 96 and it was 123 before lunch. No sliding scale insulin required today. Writer ordered more Medihoney for wound dressing change.  Patient's spouse and sister present for majority of shift. Will continue to monitor.

## 2017-08-06 NOTE — PLAN OF CARE
Problem: Goal/Outcome  Goal: Goal Outcome Summary  Outcome: Improving  Patient continues to have brand, staff does all cares.  She is aware of keeping the area clean and brand care to prevent UTI due to her increased risk of infection.  Brand cares performed.  BP's improved this shift.  Output was very large after furosemide this afternoon.  Patient's dressing on left arm appears to be soiled however she declined for staff to change the dressing stating it was due tomorrow.

## 2017-08-07 LAB
ALBUMIN SERPL-MCNC: 3.3 G/DL (ref 3.4–5)
ALP SERPL-CCNC: 189 U/L (ref 40–150)
ALT SERPL W P-5'-P-CCNC: 115 U/L (ref 0–50)
ANION GAP SERPL CALCULATED.3IONS-SCNC: 9 MMOL/L (ref 3–14)
APTT PPP: 30 SEC (ref 22–37)
AST SERPL W P-5'-P-CCNC: 63 U/L (ref 0–45)
BACTERIA SPEC CULT: NO GROWTH
BACTERIA SPEC CULT: NO GROWTH
BILIRUB DIRECT SERPL-MCNC: 0.6 MG/DL (ref 0–0.2)
BILIRUB SERPL-MCNC: 1.2 MG/DL (ref 0.2–1.3)
BUN SERPL-MCNC: 23 MG/DL (ref 7–30)
CALCIUM SERPL-MCNC: 9 MG/DL (ref 8.5–10.1)
CHLORIDE SERPL-SCNC: 95 MMOL/L (ref 94–109)
CO2 SERPL-SCNC: 33 MMOL/L (ref 20–32)
CREAT SERPL-MCNC: 0.49 MG/DL (ref 0.52–1.04)
DIFFERENTIAL METHOD BLD: ABNORMAL
ERYTHROCYTE [DISTWIDTH] IN BLOOD BY AUTOMATED COUNT: 23.3 % (ref 10–15)
FIBRINOGEN PPP-MCNC: 248 MG/DL (ref 200–420)
GFR SERPL CREATININE-BSD FRML MDRD: ABNORMAL ML/MIN/1.7M2
GLUCOSE SERPL-MCNC: 76 MG/DL (ref 70–99)
HCT VFR BLD AUTO: 24.7 % (ref 35–47)
HGB BLD-MCNC: 8 G/DL (ref 11.7–15.7)
INR PPP: 1.21 (ref 0.86–1.14)
INTERPRETATION ECG - MUSE: NORMAL
Lab: NORMAL
Lab: NORMAL
MAGNESIUM SERPL-MCNC: 2.2 MG/DL (ref 1.6–2.3)
MCH RBC QN AUTO: 29.3 PG (ref 26.5–33)
MCHC RBC AUTO-ENTMCNC: 32.4 G/DL (ref 31.5–36.5)
MCV RBC AUTO: 91 FL (ref 78–100)
MICRO REPORT STATUS: NORMAL
MICRO REPORT STATUS: NORMAL
PHOSPHATE SERPL-MCNC: 3.5 MG/DL (ref 2.5–4.5)
PLATELET # BLD AUTO: 15 10E9/L (ref 150–450)
POTASSIUM SERPL-SCNC: 4.2 MMOL/L (ref 3.4–5.3)
PROT SERPL-MCNC: 6.2 G/DL (ref 6.8–8.8)
RBC # BLD AUTO: 2.73 10E12/L (ref 3.8–5.2)
SODIUM SERPL-SCNC: 137 MMOL/L (ref 133–144)
SPECIMEN SOURCE: NORMAL
SPECIMEN SOURCE: NORMAL
URATE SERPL-MCNC: 2.3 MG/DL (ref 2.6–6)
WBC # BLD AUTO: 0.2 10E9/L (ref 4–11)

## 2017-08-07 PROCEDURE — 80076 HEPATIC FUNCTION PANEL: CPT | Performed by: PHYSICIAN ASSISTANT

## 2017-08-07 PROCEDURE — 85610 PROTHROMBIN TIME: CPT | Performed by: PHYSICIAN ASSISTANT

## 2017-08-07 PROCEDURE — 87040 BLOOD CULTURE FOR BACTERIA: CPT | Performed by: INTERNAL MEDICINE

## 2017-08-07 PROCEDURE — 85027 COMPLETE CBC AUTOMATED: CPT

## 2017-08-07 PROCEDURE — 82962 GLUCOSE BLOOD TEST: CPT

## 2017-08-07 PROCEDURE — 97530 THERAPEUTIC ACTIVITIES: CPT | Mod: GP | Performed by: PHYSICAL THERAPIST

## 2017-08-07 PROCEDURE — 25000125 ZZHC RX 250: Performed by: INTERNAL MEDICINE

## 2017-08-07 PROCEDURE — 85730 THROMBOPLASTIN TIME PARTIAL: CPT | Performed by: PHYSICIAN ASSISTANT

## 2017-08-07 PROCEDURE — 25000132 ZZH RX MED GY IP 250 OP 250 PS 637: Performed by: INTERNAL MEDICINE

## 2017-08-07 PROCEDURE — 97110 THERAPEUTIC EXERCISES: CPT | Mod: GP | Performed by: PHYSICAL THERAPIST

## 2017-08-07 PROCEDURE — 87186 SC STD MICRODIL/AGAR DIL: CPT | Performed by: INTERNAL MEDICINE

## 2017-08-07 PROCEDURE — 25000128 H RX IP 250 OP 636: Performed by: INTERNAL MEDICINE

## 2017-08-07 PROCEDURE — 84100 ASSAY OF PHOSPHORUS: CPT | Performed by: PHYSICIAN ASSISTANT

## 2017-08-07 PROCEDURE — 25000132 ZZH RX MED GY IP 250 OP 250 PS 637: Performed by: PHYSICIAN ASSISTANT

## 2017-08-07 PROCEDURE — 36592 COLLECT BLOOD FROM PICC: CPT | Performed by: INTERNAL MEDICINE

## 2017-08-07 PROCEDURE — 12800006 ZZH R&B REHAB

## 2017-08-07 PROCEDURE — 36592 COLLECT BLOOD FROM PICC: CPT | Performed by: PHYSICIAN ASSISTANT

## 2017-08-07 PROCEDURE — 87800 DETECT AGNT MULT DNA DIREC: CPT | Performed by: INTERNAL MEDICINE

## 2017-08-07 PROCEDURE — 99212 OFFICE O/P EST SF 10 MIN: CPT

## 2017-08-07 PROCEDURE — 80048 BASIC METABOLIC PNL TOTAL CA: CPT | Performed by: PHYSICIAN ASSISTANT

## 2017-08-07 PROCEDURE — 40000193 ZZH STATISTIC PT WARD VISIT: Performed by: PHYSICAL THERAPIST

## 2017-08-07 PROCEDURE — 97535 SELF CARE MNGMENT TRAINING: CPT | Mod: GO

## 2017-08-07 PROCEDURE — 83735 ASSAY OF MAGNESIUM: CPT | Performed by: PHYSICIAN ASSISTANT

## 2017-08-07 PROCEDURE — 87077 CULTURE AEROBIC IDENTIFY: CPT | Performed by: INTERNAL MEDICINE

## 2017-08-07 PROCEDURE — 40000133 ZZH STATISTIC OT WARD VISIT

## 2017-08-07 PROCEDURE — 25000128 H RX IP 250 OP 636: Performed by: PHYSICAL MEDICINE & REHABILITATION

## 2017-08-07 PROCEDURE — 99232 SBSQ HOSP IP/OBS MODERATE 35: CPT | Performed by: INTERNAL MEDICINE

## 2017-08-07 PROCEDURE — 97530 THERAPEUTIC ACTIVITIES: CPT | Mod: GO

## 2017-08-07 PROCEDURE — 25000132 ZZH RX MED GY IP 250 OP 250 PS 637: Performed by: STUDENT IN AN ORGANIZED HEALTH CARE EDUCATION/TRAINING PROGRAM

## 2017-08-07 PROCEDURE — 85384 FIBRINOGEN ACTIVITY: CPT | Performed by: PHYSICIAN ASSISTANT

## 2017-08-07 PROCEDURE — 84550 ASSAY OF BLOOD/URIC ACID: CPT | Performed by: PHYSICIAN ASSISTANT

## 2017-08-07 RX ORDER — MEROPENEM 1 G/1
1 INJECTION, POWDER, FOR SOLUTION INTRAVENOUS EVERY 8 HOURS
Status: DISCONTINUED | OUTPATIENT
Start: 2017-08-08 | End: 2017-08-08 | Stop reason: HOSPADM

## 2017-08-07 RX ORDER — DIPHENHYDRAMINE HYDROCHLORIDE AND LIDOCAINE HYDROCHLORIDE AND ALUMINUM HYDROXIDE AND MAGNESIUM HYDRO
10 KIT
Status: DISCONTINUED | OUTPATIENT
Start: 2017-08-07 | End: 2017-08-08 | Stop reason: HOSPADM

## 2017-08-07 RX ORDER — SPIRONOLACTONE 25 MG
12.5 TABLET ORAL
Status: DISCONTINUED | OUTPATIENT
Start: 2017-08-07 | End: 2017-08-08 | Stop reason: HOSPADM

## 2017-08-07 RX ORDER — FUROSEMIDE 20 MG
20 TABLET ORAL
Status: DISCONTINUED | OUTPATIENT
Start: 2017-08-07 | End: 2017-08-08

## 2017-08-07 RX ADMIN — SODIUM CHLORIDE, PRESERVATIVE FREE 5 ML: 5 INJECTION INTRAVENOUS at 22:06

## 2017-08-07 RX ADMIN — FOLIC ACID 1 MG: 1 TABLET ORAL at 08:14

## 2017-08-07 RX ADMIN — THIAMINE HCL (VITAMIN B1) 50 MG TABLET 50 MG: at 08:14

## 2017-08-07 RX ADMIN — PREDNISONE 5 MG: 5 TABLET ORAL at 08:14

## 2017-08-07 RX ADMIN — GABAPENTIN 200 MG: 100 CAPSULE ORAL at 21:01

## 2017-08-07 RX ADMIN — DIPHENHYDRAMINE HYDROCHLORIDE AND LIDOCAINE HYDROCHLORIDE AND ALUMINUM HYDROXIDE AND MAGNESIUM HYDRO 10 ML: KIT at 11:43

## 2017-08-07 RX ADMIN — POTASSIUM CHLORIDE 40 MEQ: 750 TABLET, FILM COATED, EXTENDED RELEASE ORAL at 08:13

## 2017-08-07 RX ADMIN — LEVOFLOXACIN 250 MG: 250 TABLET, FILM COATED ORAL at 08:14

## 2017-08-07 RX ADMIN — ATENOLOL 25 MG: 25 TABLET ORAL at 08:13

## 2017-08-07 RX ADMIN — ALLOPURINOL 300 MG: 300 TABLET ORAL at 08:14

## 2017-08-07 RX ADMIN — DIGOXIN 125 MCG: 125 TABLET ORAL at 08:14

## 2017-08-07 RX ADMIN — DIPHENHYDRAMINE HYDROCHLORIDE AND LIDOCAINE HYDROCHLORIDE AND ALUMINUM HYDROXIDE AND MAGNESIUM HYDRO 10 ML: KIT at 22:06

## 2017-08-07 RX ADMIN — INSULIN ASPART 1 UNITS: 100 INJECTION, SOLUTION INTRAVENOUS; SUBCUTANEOUS at 17:25

## 2017-08-07 RX ADMIN — CALCIUM CARBONATE 600 MG (1,500 MG)-VITAMIN D3 400 UNIT TABLET 2 TABLET: at 08:13

## 2017-08-07 RX ADMIN — ACETAMINOPHEN 650 MG: 325 TABLET, FILM COATED ORAL at 21:12

## 2017-08-07 RX ADMIN — GABAPENTIN 200 MG: 100 CAPSULE ORAL at 08:14

## 2017-08-07 RX ADMIN — MULTIPLE VITAMINS W/ MINERALS TAB 1 TABLET: TAB at 08:14

## 2017-08-07 RX ADMIN — GABAPENTIN 200 MG: 100 CAPSULE ORAL at 13:35

## 2017-08-07 RX ADMIN — ACYCLOVIR 400 MG: 400 TABLET ORAL at 21:01

## 2017-08-07 RX ADMIN — ACYCLOVIR 400 MG: 400 TABLET ORAL at 08:14

## 2017-08-07 RX ADMIN — DIPHENHYDRAMINE HYDROCHLORIDE AND LIDOCAINE HYDROCHLORIDE AND ALUMINUM HYDROXIDE AND MAGNESIUM HYDRO 10 ML: KIT at 17:25

## 2017-08-07 RX ADMIN — SENNOSIDES AND DOCUSATE SODIUM 1 TABLET: 8.6; 5 TABLET ORAL at 21:01

## 2017-08-07 RX ADMIN — SODIUM CHLORIDE, PRESERVATIVE FREE 5 ML: 5 INJECTION INTRAVENOUS at 05:32

## 2017-08-07 RX ADMIN — FLUCONAZOLE 200 MG: 200 TABLET ORAL at 08:14

## 2017-08-07 RX ADMIN — Medication 480 MCG: at 21:01

## 2017-08-07 NOTE — CONSULTS
Health Psychology                  Clinic    Department of Medicine  Edilma Miguel, Ph.D., L.P. [phone]                          [facility]  [facility] Yaa Buchanan, Ph.D.,  L.P. [phone]                 3rd Floor  [place] Mail Code 743   Gaurav Foster, Ph.D., LYNDA., L.P. [phone]     [address] [place] [address] Ana Barillas, Ph.D., L.P. [phone]            [id number]  [place] [address]           Luci Torres, Ph.D., L.P. [phone]     Inpatient Health Psychology Consultation*    Met with patient and  to introduce myself and Health Psychology services.   Patient and  have been followed by Palliative care clinical social work for emotional support prior to this transfer to [facility].   She initially reported low mood with focus on the many losses involved immediately after being given diagnosis of lymphoma.  Ms Michel describes that her mood at baseline is typically positive, upbeat, no history of depression or anxiety that have been obstacles to functioning.  Some occasional awakening from sleep at baseline in middle of night with difficulty reinitiating sleep that is exacerbated during this hospitalization.  Sees good progress in rehab therapies. Motivated, hopeful re recovery of stamina and strength.  Anxiety increased secondary to lack of information from Oncology re prognosis or treatment plan.   more anxious than patient, attributes this to experience after discharge in May 2017 prior to MI when he had to administer CPR at home.  Appreciate support, open to ongoing contact during this admission.  Appears to fit diagnosis of Adjustment disorder with anxiety and depressed mood (F43.23)  Full dictation to follow.    Edilma Miguel, PhD, LP  [phone]        *In accordance with the Rules of the [place] Board of [employer], it is noted that psychological descriptions and scientific procedures  underlying psychological evaluations have limitations.  Absolute predictions cannot be made based on information in this report.

## 2017-08-07 NOTE — PROGRESS NOTES
[facility] Nurse Inpatient Wound Assessment    Initial Assessment  Reason for consultation: Evaluate and treat Left arm wound    ASSESSMENT:   Left arm wound due to extravasation  Status: initial assessment    Photo taken today: Yes    TREATMENT  PLAN  Left upper arm wounds: Clean wound and skin around wound with microklenz.  Paint Cavilon no sting barrier to skin around wound.  Apply medihoney nickel thick to areas with slough.  Cover with two mepilex border dressings.      Orders Written  WO Nurse follow-up plan: Weekly  Nursing to notify the Provider(s) and re-consult the WO Nurse if wound(s) deteriorates or new skin concern.    Patient History  According to provider note(s):  Amelia Michel is a 56 year old female with complex past medical history of prior VSD repair, rheumatoid arthritis on long-term methotrexate, recent dx atrial fibrillation/flutter/SVT who was recently admitted to [facility] on  after an out of hospital cardiac arrest thought to be related to AV prieto blocking agents (angio normal). She made a full recovery and was discharged to rehab on 6/15 with a lifevest. Readmitted from rehab 17 for FUO & afib w rvr. With workup of fever and progressive cytopenias, diagnosis of DLBCL was made. Transferred to the Hematology service on . s/p Cycle 1 R-EPOCH.         Objective Data   Containment of urine/stool: Continent of bowel and Continent of bladder    Active Diet Order    Active Diet Order      Combination Diet 2 gm NA Diet    Output:  I/O last 3 completed shifts:  In: -   Out: 3550 [Urine:3550]    Risk Assessment:   Jay Jay Score  Av.9  Min: 13  Max: 19                                 Labs:     Recent Labs  Lab 17  0541  17  0204   HGB 8.0*  < > 8.1*   INR 1.21*  --  1.42*   WBC 0.2*  < > 1.6*   A1C  --   --  6.3*   < > = values in this interval not displayed.  No lab results found in last 7 days.    PHYSICAL EXAM:  Skin assessment: left upper arm    Wound Location:  Left  upper arm  Wound History: IV extravasation wound with large amount of epidermal loss.     8/2/17 (last C visit [place]) 8/7/17    Measurements (length x width x depth, in cm)   Proximal to distal: 0.1 x 0.1 x 0.2 cm, 2.2 x 1.2 x 0.6 cm, 2 x 1 x 0.5   Minimal undermining remains on middle wound from 7-3 o'clock measure 0.2 cm  Wound Base:  5% adherent tannecrosis on distal wound and 100% yellow slough on others  Palpation of the wound bed: boggy   Periwound skin: intact  Color: pink  Temperature: normal   Drainage: moderate  Description of drainage: yellow  Odor: none  Pain: minimal with wound cleansing    INTERVENTIONS:  Current support surface: Standard  Atmos Air  Current off-loading measures: Pillows under calves  Visual inspection of wounds(s) completed.  Wound Care: was done per plan of care.  Supplies: medihoney in room  Education provided today: wound care    Discussed plan of care with Patient, Family and Nurse  Face to face time: 20 minutes

## 2017-08-07 NOTE — PLAN OF CARE
Problem: Goal/Outcome  Goal: Goal Outcome Summary  Outcome: No Change  FOCUS/GOAL  Bowel management, Bladder management, Medication management and Medical management     ASSESSMENT, INTERVENTIONS AND CONTINUING PLAN FOR GOAL:  Pt continent of BM x1 using toilet in BR. Huynh is patent and draining. Catheter cares performed, and education provided on CAUTI prevention. Pt c/o sores in her mouth. Magic mouthwash ordered. Writer requested from pharmacy and gave first dose. This medication is currently in med room fridge. Pt was due for RBCs based on her Hgb of 8. Writer called blood bank to check up on requested blood. Blood bank informed writer that the order could not be fulfilled due to an issue with the order. Notified Dr. Chino. Also called blood bank back to have their MD contact Dr. Chino. Transfusion orders modified. Dr. Chino verbalized to writer that pt ok to not receive RBCs today based on modified order parameters. Educated pt on this change. Denies pain at this time. Continue with POC.

## 2017-08-07 NOTE — PLAN OF CARE
Problem: Goal/Outcome  Goal: Goal Outcome Summary  Outcome: No Change  FOCUS/GOAL  Bladder management and Mobility     ASSESSMENT, INTERVENTIONS AND CONTINUING PLAN FOR GOAL:  Pt has been sleeping well. She calls for assistance when wanting to reposition in bed. Pt is able to roll from side to side, and just needs assistance with pillow placement. Pt has brand catheter which is draining large quantity clear yellow urine. Pt denies having any pain.

## 2017-08-07 NOTE — PLAN OF CARE
Problem: Goal/Outcome  Goal: Goal Outcome Summary  OT: Pt agreeable to therapy, however limited due to fatigue. Pt required frequent breaks between activities. Pt demonstrated bed mobility in the apartment room with SBA and FWW, as well as a walk-in shower transfer with CGA and FWW.

## 2017-08-07 NOTE — CONSULTS
Health Psychology                  Clinic    Department of Medicine  Edilma Miguel, Ph.D., L.P. [phone]                          Clinics and Surgery Center  [facility] Yaa Buchanan, Ph.D.,  L.P. [phone]                 3rd Floor  [place] Mail Code 741   Gaurav Foster, Ph.D., JAMI, L.P. [phone]     [address] [place] [address] Ana Barillas, Ph.D., L.P. [phone]            [address]  [place] [address]           Luci Torres, Ph.D., L.P. [phone]     Inpatient Health Psychology Consultation*    DATE OF SERVICE:  08/07/2017      REFERRAL SOURCE:  CRISTA Emerson, Acute Rehabilitation Center, [facility], [place].      REASON FOR REFERRAL:  Amelia Michel is a 56-year-old woman who has had an extensive and complex course of illness and hospitalization since May of this year.  Briefly, Ms. Michel has a complex medical history including prior VSD repair, rheumatoid arthritis on longterm methotrexate, recent admission to [facility] on 05/29/2017 after an out-of-hospital cardiac arrest complicated by ECMO and intubation with discharge to the [facility].  She was readmitted to the Cardiology Service and ultimately diagnosed with diffuse large B-cell lymphoma.  Ms. Michel has experienced some understandable reactions of anxiety and depression since that diagnosis.  She has benefitted from emotional support through palliative care social work.  She has requested contact with Health Psychology during this ARC admission to assist her in maintaining a healthy emotional balance.      HISTORY OF PRESENT ILLNESS:  Ms. Michel reports that her baseline mood is typically positive and upbeat.  She has had no experiences of depression or anxiety that have been obstacles to healthy functioning.  She does note that, at baseline, she occasionally has difficulty with awakening in the middle of the  night and difficulty reinitiating sleep.  She attributes this to her mind becoming active and starting to think about activities that she needs to recall for the following day.      During this hospitalization, Ms. Michel notes that her middle of the night awakening has become more frequent and more difficult to overcome.  She continues to initiate sleep well, but then awakens and finds herself with racing thoughts focused on questions about her medical condition, the information that she and her  would like to have about treatment planning, and additional issues related to her current situation.  She also continues to experience atypical emotional ups and downs, with more thoughts and feelings focused on the losses that she has currently experienced and that she predicts she will continue to encounter as she goes through treatment for the lymphoma.  She does see the progress that she is making in her rehabilitation therapies and is very positive in her feelings about this.  However, given the lack of information that Ms. Michel and her  feel in terms of discharge information with planning prior to transfer to [facility], as well as their perception of lack of clarity in the treatment information that they have been given by Oncology, she continues to feel concerned.  She also expresses some concern about the possibility that she may be discharged too soon, before she is able to manage safely at home.  She notes that both her  and her sister, Lorie, who will be one of her main support people and additional caretakers, work full-time and will not be available during the day.      Ms. Michel is appreciative of this support from Health Psychology, and would appreciate ongoing contact throughout this ARC admission.      SOCIAL HISTORY:  Ms. Michel grew up in the [place] area.  She and her , Wolf, have been together for more than 30 years.  They have no children.  Ms. Michel has worked for  [employer] for over 30 years, currently in a role as a .  As noted above, Ms. Michel and her sister, Lorie, are very close and, in fact, her sister lives only 4 houses away from Ms. Michel and her .      MEDICAL HISTORY:  As noted above, Ms. Michel has a history of VSD repair in 1969.  She has rheumatoid arthritis and has been treated with methotrexate for some time.  There is some speculation in her chart that her current diagnosis of lymphoma may be related to the immunosuppression stemming from this long-term use of methotrexate.  Please see Ms. Michel's [facility] electronic medical record for more complete information on her medical history, current admission and status, and all medications.      PSYCHIATRIC HISTORY:  As above in HPI.  Ms. Michel has no history of depression or anxiety significant in affecting her functioning.  She has reported very positive reactions to the support provided by Palliative social work.  No other significant psychiatric history was provided at the time of this evaluation.      BEHAVIORAL IMPRESSIONS:  Ms. Michel presented for this contact sitting up in her room on the [facility].  Her , Wolf, was present and stayed at her request.  She reported her current mood as generally positive but more anxious and with more thoughts about loss within her current situation than are typical for her at baseline. She exhibited a completely euthymic affect.  She was alert and oriented.  Speech was clear, coherent and goal oriented.  Insight and judgment appear to be good.  She exhibited no evidence of distortions of thought or perception.      IMPRESSIONS AND PLAN:  Amelia Michel is a 56-year-old woman currently on the [facility] following a very extended and complex hospitalization that began in late May of this year.  Ms. Michel was initially hospitalized for an out-of-hospital myocardial infarction  and after stabilization of that issue and transfer to the rehabilitation floor, she was transferred back, ultimately being admitted to the Hematology Oncology Service and receiving a diagnosis of lymphoma for which she has now begun treatment.  Ms. Michel is understandably experiencing some heightened feelings of anxiety and a sense of loss that appeared to fit a diagnosis of adjustment disorder with mixed anxiety and depressed mood.  She does see the progress that she is making in her rehabilitation therapies.  She is experiencing some increase in sleep interruptions, with more than usual difficulty reinitiating sleep when she awakens secondary to anxious thinking.      Ms Michel and her  have significant concerns about the lack of information provided about treatment planning from the Oncology Service.  I do note that her  appears to be experiencing a higher level of anxiety than Ms Michel, and will likely benefit from support for himself, which could also likely assist Ms Michel in better managing her own level of anxiety. They both also expressed some concerns about discharge to home with the possibility that she may need to be readmitted fairly soon for chemotherapy.  Both she and her  have benefitted from emotional support provided by the Palliative Care social work team prior to this transfer to the [facility], and would appreciate ongoing emotional support from Health Psychology during this [facility] admission.  I will plan to follow them.      DIAGNOSES:  Adjustment disorder with mixed anxiety and depressed mood (F43.23).         EVELIN COSTELLO, PHD, LP         *In accordance with the Rules of the [place] [employer], it is noted that psychological descriptions and scientific procedures underlying psychological evaluations have limitations.  Absolute predictions cannot be made based on information in this report.     D: 08/07/2017 10:56   T: 08/07/2017 11:24   MT: [id number]      Name:     EMI  FIDELIA   MRN:      [id number]        Account:       [id number]   :      1960           Consult Date:  2017      Document: [id number]       cc: Flory TOBIN

## 2017-08-07 NOTE — PROGRESS NOTES
[facility]   Acute Rehabilitation Unit  Daily progress note    interval history  Amelia Michel was seen and examined at bedside.  Wolf present.  Reports overall doing well notes legs have gotten much smaller since admission, overall feeling ok, no fevers, nausea, vomiting, having regular bm, tolerating brand.  Appetite limited also have some pain in mouth related to sores on tongue unclear if recent injury or spontaneous though cause discomfort with eating.  No sob at rest, no palpitations.   expressing concern about discharge and importance of communication with oncology, feeling worried he won't ready or be able to provide needed cares, reassurance provided, will continue to support, educate patient and , will provide teaching as needed.      OT: Pt agreeable to therapy, however limited due to fatigue. Pt required frequent breaks between activities. Pt demonstrated bed mobility in the apartment room with SBA and FWW, as well as a walk-in shower transfer with CGA and FWW.     31/56 on DIXON 8/6    medications    lidocaine visc 2% & maalox/mylanta w/simethicone & diphenhydramine  10 mL Swish & Spit 4x Daily AC & HS     filgrastim 15 mcg/mL (in Dextrose)  480 mcg Intravenous Daily     senna-docusate  1-2 tablet Oral At Bedtime     levofloxacin  250 mg Oral Daily     acyclovir  400 mg Oral BID     allopurinol  300 mg Oral Daily     atenolol  25 mg Oral Daily     calcium-vitamin D  2 tablet Oral QAM     digoxin  125 mcg Oral Daily     fluconazole  200 mg Oral Daily     folic acid  1 mg Oral Daily     gabapentin  200 mg Oral TID     multivitamin, therapeutic with minerals  1 tablet Oral Daily     potassium chloride SA  40 mEq Oral Daily     predniSONE  5 mg Oral Daily     thiamine  50 mg Oral Daily     insulin aspart  1-7 Units Subcutaneous TID AC     insulin aspart  1-5 Units Subcutaneous At Bedtime     sodium chloride (PF)  20 mL Intracatheter Once      "heparin lock flush  5-10 mL Intracatheter Q24H        furosemide, spironolactone, acetaminophen, cetirizine, melatonin, morphine 0.1% in solosite, traMADol, glucose **OR** dextrose **OR** glucagon, naloxone, heparin lock flush, sodium chloride (PF), potassium chloride, potassium chloride, potassium chloride, potassium chloride with lidocaine, potassium chloride     physical exam  /53 (BP Location: Left leg)  Pulse 67  Temp 97.9  F (36.6  C) (Axillary)  Resp 16  Ht 1.473 m (4' 10\")  Wt 56.1 kg (123 lb 9.6 oz)  SpO2 97%  BMI 25.83 kg/m2  Gen: alert nad  HEENT: receding gums, dry mouth, left lateral tongue with  2 circular white lesions  Cardio: rrr  Pulm: non labored clear on room air  Abd: soft non tender +BS  Ext: warm dry with hose on with 1+ ble edema  Neuro/MSK: alert moves all extremities up with walker with transfer to chair with sba from therapy.    labs  Reviewed.     Rehabilitation - continue comprehensive acute inpatient rehabilitation program with multidisciplinary approach including therapies, rehab nursing, and physiatry following. See interval history for updates.      assessment and plan  Amelia Michel is a 56 year old woman with a history of VSD repair (1969), Rheumatoid arthritis, and a recent diagnosis of Afib/Aflutter/SVT, who initially was admitted to [facility] on 5/29 after Out of Hospital cardiac arrest she was admitted from 5/29-6/15, extubated 6/4. Admitted to ARU 6/15,  for ongoing rehabilitation following prolonged hospitalization while at ARU (6/15-6/19), pt developed worsening LUE wound pain and fever, and was admitted back to [facility] for further workup. Underwent extensive workup in setting of fever and progressive cytopenias, ultimately diagnosed with Diffuse Large B-Cell Lymphoma s.o chemotherapy. Admitted to ARU 8/4/2017 for ongoing medical management and aggressive rehabilitation.     Diffuse Large B-Cell Lymphoma- Stage 4 with bone marrow and liver involvement. IPI 4. Per " Oncology Possibly related to prior methotrexate.   - PET/CT 7/18 revealing for mediastinal and neck level 2A lymphadenopathy, extensive FDG-avid bony lesions, hepatosplenic FDG-avid lesions, pelvic lesions continuous with the uterus, and bilateral pleural effusions.    - Bone marrow biopsy 7/12: scattered plasma cells in perivascular clusters, with no definitive staining of B-cell population by CD5; on re-review, possibly consistent with intravascular large B-cell lymphoma, liver biopsy (7/21) consistent with DLBCL  - Completed Cycle 1 of dose adjusted R-EPOCH (Day 1=7/28/17). Tolerated well overall, (hypotension SOB with Rituxan).  -Continue daily Neupogen (starting D6, 8/2)  PPX- continue Levaquin, Fluconazole  -follow up oncology 8/16  -seen by palliative team during hospitalization- will consult health psychology during ARU stay for ongoing emotional support (see note by Dr. Miguel 8/7 for details)       Atrial fibrillation/flutter  Rate controlled with atenolol and digoxin. Currently in NSR with HR in the 60s.   -- No anticoagulation due to thrombocytopenia  - continue Digoxin 125 mcg daily.   - continue Atenolol 25 mg daily       Recent out of hospital cardiac arrest (5/29/17)- Etiology remains unclear, possibly related to AV prieto blocking agents.  -- She meets criteria for secondary prevention ICD. Placement is currently on hold given LUE extravasation wound, also pending treatment and prognosis of lymphoma.    -- EP discussed risk vs benefit of Lifevest, she would like to defer at this time  -- QTc 440 today  -- Plan to see Dr. Dickson (cardiology) in clinic in 2-3 months      Anasarca, volume overload- with significant diuresis since 8/4 weight continues to down trend 123 lbs 8/7.  With 6.5 liters out 8/5.   - trend BMP  -diuretics discontinued.   - 2g Na limit  - monitor daily weights  -monitor I&O    Mouth Sores- noted on left lateral tongue.  White based sores with minimal surrounding redness, painful  and interfering with intake  -start magic mouth wash      Hyperuricemia- Uric acid up to 8.2 (7/27 PM), s/p Rasburicase. Uric acid improved to 2.3  - Continue Allopurinol.   -monitor mag/phos/uric acid      DIC. INR prolonged, now improving. received Vitamin K 10 mg PO x 3 days (7/28-7/30) 8/7 INR 1.2, Fibrinogen 248.   - conditional transfusion parameters in place: 2U plasma for INR >1.8, 5U cryo for fibrinogen <150.    - coag monitoring to q MWF x 4 occurrences      Pancytopenia, secondary to DLBCL  - transfuse to maintain Hgb >8.0 (keep given recent cardiac history), platelets >10,000 Received PLTS 8/5.   -Neupogen daily as above.   -trend CBC  -neutropenic precautions.      Bradycardia- HR 50's-60s. Stable.   -monitor       Saddle anesthesia, urinary incontinence. Pt reports this is baseline for her. Unclear etiology. She has been noted to have an S1 radiculopathy. Seen by Neuro( Dr. Bhatia) dated 8/1 for details   -Suggested urodynamic study on outpatient basis  -plan to investigate CNS involvement for lymphoma      Rheumatoid arthritis -History of seropositive rheumatoid arthritis (anti-CCP positive) since late 1980;s w/ multiple MTP osteotomies, partial right wrist fusion, longstanding therapy consisting of MTX, prednisone and HCQ  - Tapered to 5 mg prednisone on 7/17 held during stay resumed- per oncology-  5mg daily starting 8/4  - Follow-up with [facility] Dr. Conor Cunha in 4-6 weeks after discharge      Steroid induced hyperglycemia. Added QID BG check and med SSI. Glucose       Extravasation-related LUE wound. Slowly improving.   - WOCN consult placed  -wound care per orders       Hypokalemia. 2/2 lasix which is now discontinued. K.4.2 8/7  - Continue  to trend bmp-  -continue scheduled 40 mEq potassium- consider decrease/ d/c supplement if uptrend 8/9.       Aortic masses. BRE on 7/14 showed 3 small masses on the coronary cusps and LVOT. Do not believe this to be endocarditis; no  antibiotics at this time. Unclear what this represents; per cardiology- possibly could be Libmann-Sacks.  -follow per cardiology        LFT derangement- 2/2 lymphoma involvement s/p liver biopsy 7/21. ALT/AST significantly improved from prior. Alk phos 189(232),  (108), AST 63 (75) Bilirubin 0.6 stable  -continue to trend  -no ab pain         Patient seen and discussed with Dr. Babcock  PM&R Staff Physician    Flory Agarwal PA-C  Rehab Service  Pager: [phone]

## 2017-08-07 NOTE — PLAN OF CARE
Problem: Goal/Outcome  Goal: Goal Outcome Summary  Outcome: Improving  Patient rested in bed most of the evening.    Platelets were 11 today. No bruising or bleeding noted.    Huynh draining clear yellow urine. She had over 1 liter out in 24 hours so afternoon lasix was held per Dr. Gallo.  Patient has been asymptomatic and vitals stable.  Continues on neutropenic precautions due to WBC 0.3.    Dressing changed to left arm, wounds are open and yellow inside.    Patient had loose BM on day shift. Senna held this evening.

## 2017-08-08 ENCOUNTER — APPOINTMENT (OUTPATIENT)
Dept: CT IMAGING | Facility: CLINIC | Age: 57
DRG: 871 | End: 2017-08-08
Attending: INTERNAL MEDICINE
Payer: COMMERCIAL

## 2017-08-08 ENCOUNTER — HOSPITAL ENCOUNTER (INPATIENT)
Facility: CLINIC | Age: 57
LOS: 4 days | Discharge: NURSING, REHAB OR LONG-TERM CARE | DRG: 871 | End: 2017-08-12
Attending: INTERNAL MEDICINE | Admitting: INTERNAL MEDICINE
Payer: COMMERCIAL

## 2017-08-08 ENCOUNTER — APPOINTMENT (OUTPATIENT)
Dept: GENERAL RADIOLOGY | Facility: CLINIC | Age: 57
DRG: 871 | End: 2017-08-08
Attending: INTERNAL MEDICINE
Payer: COMMERCIAL

## 2017-08-08 VITALS
DIASTOLIC BLOOD PRESSURE: 76 MMHG | OXYGEN SATURATION: 97 % | BODY MASS INDEX: 25.84 KG/M2 | WEIGHT: 123.1 LBS | HEART RATE: 72 BPM | SYSTOLIC BLOOD PRESSURE: 152 MMHG | RESPIRATION RATE: 16 BRPM | TEMPERATURE: 96.3 F | HEIGHT: 58 IN

## 2017-08-08 DIAGNOSIS — R78.81 GRAM-POSITIVE BACTEREMIA: ICD-10-CM

## 2017-08-08 DIAGNOSIS — G58.9 MONONEUROPATHY: ICD-10-CM

## 2017-08-08 DIAGNOSIS — G47.00 INSOMNIA, UNSPECIFIED: ICD-10-CM

## 2017-08-08 DIAGNOSIS — K59.00 CONSTIPATION, UNSPECIFIED CONSTIPATION TYPE: ICD-10-CM

## 2017-08-08 DIAGNOSIS — I48.0 PAROXYSMAL ATRIAL FIBRILLATION (H): ICD-10-CM

## 2017-08-08 DIAGNOSIS — I15.8 OTHER SECONDARY HYPERTENSION: ICD-10-CM

## 2017-08-08 DIAGNOSIS — C83.398 DIFFUSE LARGE B-CELL LYMPHOMA OF EXTRANODAL SITE: ICD-10-CM

## 2017-08-08 DIAGNOSIS — I47.19 ATRIAL TACHYCARDIA (H): ICD-10-CM

## 2017-08-08 DIAGNOSIS — M79.602 ARM PAIN, LEFT: ICD-10-CM

## 2017-08-08 DIAGNOSIS — E61.9 DEFICIENCY OF NUTRIENT ELEMENT, UNSPECIFIED: ICD-10-CM

## 2017-08-08 DIAGNOSIS — R57.0 CARDIOGENIC SHOCK (H): ICD-10-CM

## 2017-08-08 DIAGNOSIS — E56.9 VITAMIN DEFICIENCY: Primary | ICD-10-CM

## 2017-08-08 DIAGNOSIS — M05.79 RHEUMATOID ARTHRITIS INVOLVING MULTIPLE SITES WITH POSITIVE RHEUMATOID FACTOR (H): ICD-10-CM

## 2017-08-08 DIAGNOSIS — R73.9 HYPERGLYCEMIA: ICD-10-CM

## 2017-08-08 DIAGNOSIS — E83.51 HYPOCALCEMIA: ICD-10-CM

## 2017-08-08 DIAGNOSIS — E53.8 FOLIC ACID DEFICIENCY: ICD-10-CM

## 2017-08-08 DIAGNOSIS — J30.2 SEASONAL ALLERGIC RHINITIS, UNSPECIFIED CHRONICITY, UNSPECIFIED TRIGGER: ICD-10-CM

## 2017-08-08 PROBLEM — R50.81 FEBRILE NEUTROPENIA (H): Status: ACTIVE | Noted: 2017-08-08

## 2017-08-08 PROBLEM — D70.9 FEBRILE NEUTROPENIA (H): Status: ACTIVE | Noted: 2017-08-08

## 2017-08-08 LAB
ABO + RH BLD: NORMAL
ABO + RH BLD: NORMAL
ALBUMIN UR-MCNC: 30 MG/DL
ANION GAP SERPL CALCULATED.3IONS-SCNC: 6 MMOL/L (ref 3–14)
APPEARANCE UR: CLEAR
BILIRUB UR QL STRIP: NEGATIVE
BLD GP AB SCN SERPL QL: NORMAL
BLD PROD TYP BPU: NORMAL
BLD UNIT ID BPU: 0
BLOOD BANK CMNT PATIENT-IMP: NORMAL
BLOOD PRODUCT CODE: NORMAL
BPU ID: NORMAL
BUN SERPL-MCNC: 18 MG/DL (ref 7–30)
CALCIUM SERPL-MCNC: 9.3 MG/DL (ref 8.5–10.1)
CHLORIDE SERPL-SCNC: 98 MMOL/L (ref 94–109)
CO2 SERPL-SCNC: 26 MMOL/L (ref 20–32)
COLOR UR AUTO: YELLOW
CREAT SERPL-MCNC: 0.48 MG/DL (ref 0.52–1.04)
DIFFERENTIAL METHOD BLD: ABNORMAL
ERYTHROCYTE [DISTWIDTH] IN BLOOD BY AUTOMATED COUNT: 23.2 % (ref 10–15)
GFR SERPL CREATININE-BSD FRML MDRD: ABNORMAL ML/MIN/1.7M2
GLUCOSE BLDC GLUCOMTR-MCNC: 110 MG/DL (ref 70–99)
GLUCOSE SERPL-MCNC: 85 MG/DL (ref 70–99)
GLUCOSE UR STRIP-MCNC: NEGATIVE MG/DL
HCT VFR BLD AUTO: 23.6 % (ref 35–47)
HGB BLD-MCNC: 7.7 G/DL (ref 11.7–15.7)
HGB UR QL STRIP: ABNORMAL
HYALINE CASTS #/AREA URNS LPF: 1 /LPF (ref 0–2)
KETONES UR STRIP-MCNC: NEGATIVE MG/DL
LEUKOCYTE ESTERASE UR QL STRIP: NEGATIVE
MCH RBC QN AUTO: 29.2 PG (ref 26.5–33)
MCHC RBC AUTO-ENTMCNC: 32.6 G/DL (ref 31.5–36.5)
MCV RBC AUTO: 89 FL (ref 78–100)
NITRATE UR QL: NEGATIVE
NUM BPU REQUESTED: 1
PH UR STRIP: 8 PH (ref 5–7)
PLATELET # BLD AUTO: 3 10E9/L (ref 150–450)
POTASSIUM SERPL-SCNC: 4.2 MMOL/L (ref 3.4–5.3)
RBC # BLD AUTO: 2.64 10E12/L (ref 3.8–5.2)
RBC #/AREA URNS AUTO: >182 /HPF (ref 0–2)
SODIUM SERPL-SCNC: 130 MMOL/L (ref 133–144)
SP GR UR STRIP: 1.01 (ref 1–1.03)
SPECIMEN EXP DATE BLD: NORMAL
TRANSFUSION STATUS PATIENT QL: NORMAL
URN SPEC COLLECT METH UR: ABNORMAL
UROBILINOGEN UR STRIP-MCNC: NORMAL MG/DL (ref 0–2)
WBC # BLD AUTO: 0.1 10E9/L (ref 4–11)
WBC #/AREA URNS AUTO: <1 /HPF (ref 0–2)

## 2017-08-08 PROCEDURE — 25000132 ZZH RX MED GY IP 250 OP 250 PS 637: Performed by: INTERNAL MEDICINE

## 2017-08-08 PROCEDURE — 85049 AUTOMATED PLATELET COUNT: CPT | Performed by: PHYSICAL MEDICINE & REHABILITATION

## 2017-08-08 PROCEDURE — 99223 1ST HOSP IP/OBS HIGH 75: CPT | Mod: AI | Performed by: INTERNAL MEDICINE

## 2017-08-08 PROCEDURE — 25000128 H RX IP 250 OP 636: Performed by: PHYSICAL MEDICINE & REHABILITATION

## 2017-08-08 PROCEDURE — 85027 COMPLETE CBC AUTOMATED: CPT

## 2017-08-08 PROCEDURE — 81001 URINALYSIS AUTO W/SCOPE: CPT | Performed by: INTERNAL MEDICINE

## 2017-08-08 PROCEDURE — 99233 SBSQ HOSP IP/OBS HIGH 50: CPT | Performed by: INTERNAL MEDICINE

## 2017-08-08 PROCEDURE — 25000128 H RX IP 250 OP 636: Performed by: INTERNAL MEDICINE

## 2017-08-08 PROCEDURE — 87040 BLOOD CULTURE FOR BACTERIA: CPT | Performed by: INTERNAL MEDICINE

## 2017-08-08 PROCEDURE — 20000002 ZZH R&B BMT INTERMEDIATE

## 2017-08-08 PROCEDURE — 40000957 ZZHCL STATISTIC MUMPS VIRUS PCR: Performed by: INTERNAL MEDICINE

## 2017-08-08 PROCEDURE — 87086 URINE CULTURE/COLONY COUNT: CPT | Performed by: INTERNAL MEDICINE

## 2017-08-08 PROCEDURE — 70491 CT SOFT TISSUE NECK W/DYE: CPT

## 2017-08-08 PROCEDURE — 80048 BASIC METABOLIC PNL TOTAL CA: CPT | Performed by: INTERNAL MEDICINE

## 2017-08-08 PROCEDURE — P9037 PLATE PHERES LEUKOREDU IRRAD: HCPCS | Performed by: INTERNAL MEDICINE

## 2017-08-08 PROCEDURE — 87088 URINE BACTERIA CULTURE: CPT | Performed by: INTERNAL MEDICINE

## 2017-08-08 PROCEDURE — 00000146 ZZHCL STATISTIC GLUCOSE BY METER IP

## 2017-08-08 PROCEDURE — 25000125 ZZHC RX 250: Performed by: INTERNAL MEDICINE

## 2017-08-08 PROCEDURE — 87186 SC STD MICRODIL/AGAR DIL: CPT | Performed by: INTERNAL MEDICINE

## 2017-08-08 PROCEDURE — 71020 XR CHEST 2 VW: CPT

## 2017-08-08 PROCEDURE — P9016 RBC LEUKOCYTES REDUCED: HCPCS | Performed by: INTERNAL MEDICINE

## 2017-08-08 PROCEDURE — 25000128 H RX IP 250 OP 636

## 2017-08-08 RX ORDER — ALLOPURINOL 300 MG/1
300 TABLET ORAL DAILY
Status: DISCONTINUED | OUTPATIENT
Start: 2017-08-09 | End: 2017-08-12 | Stop reason: HOSPADM

## 2017-08-08 RX ORDER — FUROSEMIDE 20 MG
20 TABLET ORAL 2 TIMES DAILY
Status: DISCONTINUED | OUTPATIENT
Start: 2017-08-08 | End: 2017-08-08 | Stop reason: HOSPADM

## 2017-08-08 RX ORDER — MEROPENEM 1 G/1
1 INJECTION, POWDER, FOR SOLUTION INTRAVENOUS EVERY 8 HOURS
Status: DISCONTINUED | OUTPATIENT
Start: 2017-08-08 | End: 2017-08-10

## 2017-08-08 RX ORDER — MULTIVIT WITH MINERALS/LUTEIN
500 TABLET ORAL DAILY
Status: DISCONTINUED | OUTPATIENT
Start: 2017-08-09 | End: 2017-08-12 | Stop reason: HOSPADM

## 2017-08-08 RX ORDER — PROCHLORPERAZINE MALEATE 5 MG
5-10 TABLET ORAL EVERY 6 HOURS PRN
Status: DISCONTINUED | OUTPATIENT
Start: 2017-08-08 | End: 2017-08-12 | Stop reason: HOSPADM

## 2017-08-08 RX ORDER — IOPAMIDOL 755 MG/ML
100 INJECTION, SOLUTION INTRAVASCULAR ONCE
Status: COMPLETED | OUTPATIENT
Start: 2017-08-08 | End: 2017-08-08

## 2017-08-08 RX ORDER — LORAZEPAM 2 MG/ML
.5-1 INJECTION INTRAMUSCULAR EVERY 6 HOURS PRN
Status: DISCONTINUED | OUTPATIENT
Start: 2017-08-08 | End: 2017-08-12 | Stop reason: HOSPADM

## 2017-08-08 RX ORDER — MULTIPLE VITAMINS W/ MINERALS TAB 9MG-400MCG
1 TAB ORAL DAILY
Status: DISCONTINUED | OUTPATIENT
Start: 2017-08-09 | End: 2017-08-12 | Stop reason: HOSPADM

## 2017-08-08 RX ORDER — GABAPENTIN 100 MG/1
200 CAPSULE ORAL 3 TIMES DAILY
Status: DISCONTINUED | OUTPATIENT
Start: 2017-08-08 | End: 2017-08-12 | Stop reason: HOSPADM

## 2017-08-08 RX ORDER — DIGOXIN 125 MCG
125 TABLET ORAL DAILY
Status: DISCONTINUED | OUTPATIENT
Start: 2017-08-09 | End: 2017-08-12 | Stop reason: HOSPADM

## 2017-08-08 RX ORDER — POTASSIUM CHLORIDE 29.8 MG/ML
20 INJECTION INTRAVENOUS
Status: DISCONTINUED | OUTPATIENT
Start: 2017-08-08 | End: 2017-08-08

## 2017-08-08 RX ORDER — ONDANSETRON 2 MG/ML
4 INJECTION INTRAMUSCULAR; INTRAVENOUS EVERY 6 HOURS PRN
Status: DISCONTINUED | OUTPATIENT
Start: 2017-08-08 | End: 2017-08-12 | Stop reason: HOSPADM

## 2017-08-08 RX ORDER — FUROSEMIDE 40 MG
20 TABLET ORAL 2 TIMES DAILY
Qty: 30 TABLET | Status: ON HOLD | DISCHARGE
Start: 2017-08-08 | End: 2017-08-10

## 2017-08-08 RX ORDER — DIPHENHYDRAMINE HYDROCHLORIDE AND LIDOCAINE HYDROCHLORIDE AND ALUMINUM HYDROXIDE AND MAGNESIUM HYDRO
10 KIT
Status: DISCONTINUED | OUTPATIENT
Start: 2017-08-08 | End: 2017-08-08

## 2017-08-08 RX ORDER — DIPHENHYDRAMINE HYDROCHLORIDE AND LIDOCAINE HYDROCHLORIDE AND ALUMINUM HYDROXIDE AND MAGNESIUM HYDRO
10 KIT
Status: ON HOLD | DISCHARGE
Start: 2017-08-08 | End: 2017-08-11

## 2017-08-08 RX ORDER — ACYCLOVIR 400 MG/1
400 TABLET ORAL 2 TIMES DAILY
Status: DISCONTINUED | OUTPATIENT
Start: 2017-08-08 | End: 2017-08-12 | Stop reason: HOSPADM

## 2017-08-08 RX ORDER — MEROPENEM 1 G/1
1 INJECTION, POWDER, FOR SOLUTION INTRAVENOUS EVERY 8 HOURS
Qty: 30 EACH | Status: ON HOLD | DISCHARGE
Start: 2017-08-08 | End: 2017-08-11

## 2017-08-08 RX ORDER — NICOTINE POLACRILEX 4 MG
15-30 LOZENGE BUCCAL
Status: DISCONTINUED | OUTPATIENT
Start: 2017-08-08 | End: 2017-08-10 | Stop reason: CLARIF

## 2017-08-08 RX ORDER — NALOXONE HYDROCHLORIDE 0.4 MG/ML
.1-.4 INJECTION, SOLUTION INTRAMUSCULAR; INTRAVENOUS; SUBCUTANEOUS
Status: DISCONTINUED | OUTPATIENT
Start: 2017-08-08 | End: 2017-08-12 | Stop reason: HOSPADM

## 2017-08-08 RX ORDER — HEPARIN SODIUM,PORCINE 10 UNIT/ML
5-10 VIAL (ML) INTRAVENOUS EVERY 24 HOURS
Status: DISCONTINUED | OUTPATIENT
Start: 2017-08-08 | End: 2017-08-12 | Stop reason: HOSPADM

## 2017-08-08 RX ORDER — LORAZEPAM 0.5 MG/1
.5-1 TABLET ORAL EVERY 6 HOURS PRN
Status: DISCONTINUED | OUTPATIENT
Start: 2017-08-08 | End: 2017-08-12 | Stop reason: HOSPADM

## 2017-08-08 RX ORDER — LEVOFLOXACIN 250 MG/1
250 TABLET, FILM COATED ORAL DAILY
Refills: 0 | Status: ON HOLD
Start: 2017-08-08 | End: 2017-08-11

## 2017-08-08 RX ORDER — HEPARIN SODIUM,PORCINE 10 UNIT/ML
5-10 VIAL (ML) INTRAVENOUS
Status: DISCONTINUED | OUTPATIENT
Start: 2017-08-08 | End: 2017-08-12 | Stop reason: HOSPADM

## 2017-08-08 RX ORDER — POTASSIUM CHLORIDE 29.8 MG/ML
20 INJECTION INTRAVENOUS
Status: DISCONTINUED | OUTPATIENT
Start: 2017-08-08 | End: 2017-08-10 | Stop reason: CLARIF

## 2017-08-08 RX ORDER — PREDNISONE 5 MG/1
5 TABLET ORAL DAILY
Status: DISCONTINUED | OUTPATIENT
Start: 2017-08-09 | End: 2017-08-12 | Stop reason: HOSPADM

## 2017-08-08 RX ORDER — VANCOMYCIN HYDROCHLORIDE 1 G/200ML
1000 INJECTION, SOLUTION INTRAVENOUS EVERY 12 HOURS
Status: DISCONTINUED | OUTPATIENT
Start: 2017-08-08 | End: 2017-08-09

## 2017-08-08 RX ORDER — LIDOCAINE 40 MG/G
CREAM TOPICAL
Status: DISCONTINUED | OUTPATIENT
Start: 2017-08-08 | End: 2017-08-10 | Stop reason: CLARIF

## 2017-08-08 RX ORDER — POTASSIUM CHLORIDE 750 MG/1
20-40 TABLET, EXTENDED RELEASE ORAL
Status: DISCONTINUED | OUTPATIENT
Start: 2017-08-08 | End: 2017-08-10 | Stop reason: CLARIF

## 2017-08-08 RX ORDER — ALLOPURINOL 300 MG/1
300 TABLET ORAL DAILY
Status: DISCONTINUED | OUTPATIENT
Start: 2017-08-08 | End: 2017-08-08

## 2017-08-08 RX ORDER — CETIRIZINE HYDROCHLORIDE 10 MG/1
10 TABLET ORAL DAILY PRN
Status: DISCONTINUED | OUTPATIENT
Start: 2017-08-08 | End: 2017-08-12 | Stop reason: HOSPADM

## 2017-08-08 RX ORDER — ACETAMINOPHEN 325 MG/1
650 TABLET ORAL EVERY 4 HOURS PRN
Status: DISCONTINUED | OUTPATIENT
Start: 2017-08-08 | End: 2017-08-12 | Stop reason: HOSPADM

## 2017-08-08 RX ORDER — ATENOLOL 25 MG/1
25 TABLET ORAL DAILY
Status: DISCONTINUED | OUTPATIENT
Start: 2017-08-09 | End: 2017-08-09

## 2017-08-08 RX ORDER — POTASSIUM CHLORIDE 1.5 G/1.58G
20-40 POWDER, FOR SOLUTION ORAL
Status: DISCONTINUED | OUTPATIENT
Start: 2017-08-08 | End: 2017-08-10 | Stop reason: CLARIF

## 2017-08-08 RX ORDER — POTASSIUM CHLORIDE 7.45 MG/ML
10 INJECTION INTRAVENOUS
Status: DISCONTINUED | OUTPATIENT
Start: 2017-08-08 | End: 2017-08-10 | Stop reason: CLARIF

## 2017-08-08 RX ORDER — AMOXICILLIN 250 MG
1-2 CAPSULE ORAL 2 TIMES DAILY PRN
Status: DISCONTINUED | OUTPATIENT
Start: 2017-08-08 | End: 2017-08-12 | Stop reason: HOSPADM

## 2017-08-08 RX ORDER — POTASSIUM CL/LIDO/0.9 % NACL 10MEQ/0.1L
10 INTRAVENOUS SOLUTION, PIGGYBACK (ML) INTRAVENOUS
Status: DISCONTINUED | OUTPATIENT
Start: 2017-08-08 | End: 2017-08-10 | Stop reason: CLARIF

## 2017-08-08 RX ORDER — MAGNESIUM SULFATE HEPTAHYDRATE 40 MG/ML
4 INJECTION, SOLUTION INTRAVENOUS EVERY 4 HOURS PRN
Status: DISCONTINUED | OUTPATIENT
Start: 2017-08-08 | End: 2017-08-10 | Stop reason: CLARIF

## 2017-08-08 RX ORDER — FLUCONAZOLE 200 MG/1
200 TABLET ORAL DAILY
Status: DISCONTINUED | OUTPATIENT
Start: 2017-08-09 | End: 2017-08-12 | Stop reason: HOSPADM

## 2017-08-08 RX ORDER — FOLIC ACID 1 MG/1
1 TABLET ORAL DAILY
Status: DISCONTINUED | OUTPATIENT
Start: 2017-08-09 | End: 2017-08-12 | Stop reason: HOSPADM

## 2017-08-08 RX ORDER — DEXTROSE MONOHYDRATE 25 G/50ML
25-50 INJECTION, SOLUTION INTRAVENOUS
Status: DISCONTINUED | OUTPATIENT
Start: 2017-08-08 | End: 2017-08-10 | Stop reason: CLARIF

## 2017-08-08 RX ADMIN — VANCOMYCIN HYDROCHLORIDE 1000 MG: 1 INJECTION, SOLUTION INTRAVENOUS at 19:56

## 2017-08-08 RX ADMIN — FOLIC ACID 1 MG: 1 TABLET ORAL at 08:07

## 2017-08-08 RX ADMIN — ALLOPURINOL 300 MG: 300 TABLET ORAL at 08:07

## 2017-08-08 RX ADMIN — DIGOXIN 125 MCG: 125 TABLET ORAL at 08:08

## 2017-08-08 RX ADMIN — SODIUM CHLORIDE, PRESERVATIVE FREE 5 ML: 5 INJECTION INTRAVENOUS at 05:44

## 2017-08-08 RX ADMIN — THIAMINE HCL (VITAMIN B1) 50 MG TABLET 50 MG: at 08:06

## 2017-08-08 RX ADMIN — FLUCONAZOLE 200 MG: 200 TABLET ORAL at 08:07

## 2017-08-08 RX ADMIN — ATENOLOL 25 MG: 25 TABLET ORAL at 08:06

## 2017-08-08 RX ADMIN — MULTIPLE VITAMINS W/ MINERALS TAB 1 TABLET: TAB at 08:08

## 2017-08-08 RX ADMIN — GABAPENTIN 200 MG: 100 CAPSULE ORAL at 19:55

## 2017-08-08 RX ADMIN — LEVOFLOXACIN 250 MG: 250 TABLET, FILM COATED ORAL at 08:08

## 2017-08-08 RX ADMIN — SODIUM CHLORIDE, PRESERVATIVE FREE 5 ML: 5 INJECTION INTRAVENOUS at 18:50

## 2017-08-08 RX ADMIN — POTASSIUM CHLORIDE 40 MEQ: 750 TABLET, FILM COATED, EXTENDED RELEASE ORAL at 08:07

## 2017-08-08 RX ADMIN — ALUMINUM HYDROXIDE, MAGNESIUM HYDROXIDE, SIMETHICONE 10 ML: 400; 400; 40 SUSPENSION ORAL at 21:26

## 2017-08-08 RX ADMIN — FUROSEMIDE 20 MG: 20 TABLET ORAL at 10:58

## 2017-08-08 RX ADMIN — ACYCLOVIR 400 MG: 400 TABLET ORAL at 08:07

## 2017-08-08 RX ADMIN — GABAPENTIN 200 MG: 100 CAPSULE ORAL at 13:47

## 2017-08-08 RX ADMIN — MEROPENEM 1 G: 1 INJECTION, POWDER, FOR SOLUTION INTRAVENOUS at 19:06

## 2017-08-08 RX ADMIN — CALCIUM CARBONATE 600 MG (1,500 MG)-VITAMIN D3 400 UNIT TABLET 2 TABLET: at 08:07

## 2017-08-08 RX ADMIN — ALUMINUM HYDROXIDE, MAGNESIUM HYDROXIDE, SIMETHICONE 10 ML: 400; 400; 40 SUSPENSION ORAL at 18:49

## 2017-08-08 RX ADMIN — GABAPENTIN 200 MG: 100 CAPSULE ORAL at 08:07

## 2017-08-08 RX ADMIN — IOPAMIDOL 100 ML: 755 INJECTION, SOLUTION INTRAVENOUS at 21:15

## 2017-08-08 RX ADMIN — SODIUM CHLORIDE, PRESERVATIVE FREE 5 ML: 5 INJECTION INTRAVENOUS at 02:26

## 2017-08-08 RX ADMIN — PREDNISONE 5 MG: 5 TABLET ORAL at 08:06

## 2017-08-08 RX ADMIN — ACYCLOVIR 400 MG: 400 TABLET ORAL at 19:56

## 2017-08-08 RX ADMIN — Medication 480 MCG: at 19:55

## 2017-08-08 RX ADMIN — MEROPENEM 1 G: 1 INJECTION, POWDER, FOR SOLUTION INTRAVENOUS at 01:44

## 2017-08-08 RX ADMIN — ACETAMINOPHEN 650 MG: 325 TABLET, FILM COATED ORAL at 06:55

## 2017-08-08 RX ADMIN — DIPHENHYDRAMINE HYDROCHLORIDE AND LIDOCAINE HYDROCHLORIDE AND ALUMINUM HYDROXIDE AND MAGNESIUM HYDRO 10 ML: KIT at 06:55

## 2017-08-08 RX ADMIN — INSULIN ASPART 1 UNITS: 100 INJECTION, SOLUTION INTRAVENOUS; SUBCUTANEOUS at 13:08

## 2017-08-08 RX ADMIN — DIPHENHYDRAMINE HYDROCHLORIDE AND LIDOCAINE HYDROCHLORIDE AND ALUMINUM HYDROXIDE AND MAGNESIUM HYDRO 10 ML: KIT at 10:58

## 2017-08-08 RX ADMIN — MEROPENEM 1 G: 1 INJECTION, POWDER, FOR SOLUTION INTRAVENOUS at 07:48

## 2017-08-08 NOTE — PHARMACY-VANCOMYCIN DOSING SERVICE
Pharmacy Vancomycin Initial Note  Date of Service 2017  Patient's  1960  56 year old, female    Indication: Bacteremia    Current estimated CrCl = Estimated Creatinine Clearance: 112.9 mL/min (based on Cr of 0.49).    Creatinine for last 3 days  2017:  5:47 AM Creatinine 0.51 mg/dL  2017:  5:41 AM Creatinine 0.49 mg/dL    Recent Vancomycin Level(s) for last 3 days  No results found for requested labs within last 72 hours.      Vancomycin IV Administrations (past 72 hours)      No vancomycin orders with administrations in past 72 hours.                Nephrotoxins and other renal medications (Future)    Start     Dose/Rate Route Frequency Ordered Stop    17  acyclovir (ZOVIRAX) tablet 400 mg      400 mg Oral 2 TIMES DAILY 17 1732      17 1800  vancomycin (VANCOCIN) 1000 mg in dextrose 5% 200 mL PREMIX      1,000 mg Intravenous EVERY 12 HOURS 17 1754            Contrast Orders - past 72 hours     None                Plan:  1.  Start vancomycin  1000 mg IV q12h.   2.  Goal Trough Level: 10-15 mg/L   3.  Pharmacy will check trough levels as appropriate in 1-3 Days.    4. Serum creatinine levels will be ordered daily for the first week of therapy and at least twice weekly for subsequent weeks.    5. Embarrass method utilized to dose vancomycin therapy: Method 2    Evie Zavala

## 2017-08-08 NOTE — PLAN OF CARE
Problem: Goal/Outcome  Goal: Goal Outcome Summary  Physical Therapy Discharge Summary     Reason for therapy discharge:    Change in medical status.     Progress towards therapy goal(s). See goals on Care Plan in [facility] electronic health record for goal details.  Goals partially met.  Barriers to achieving goals:   discharge from facility.     Therapy recommendation(s):    Continued therapy is recommended.  Rationale/Recommendations:  once stable. .

## 2017-08-08 NOTE — PROGRESS NOTES
"S/B- Transfer from [facility] for sepsis. Positive blood cultures from purple port of PICC and fevers. Hx: lymphoma with recent chemotherapy regimen completed.   A/R- vitals upon arrival: /82  Temp 99.7  F (37.6  C) (Axillary)  Resp 18  Ht 1.465 m (4' 9.68\")  Wt 55.8 kg (123 lb 1.6 oz)  SpO2 98%  BMI 26.02 kg/m2. Alert & oriented. Up with assist x 1 and a walker d/t generalized weakness. Pt settled comfortably and awaiting for orders. Antibiotics will be started and moonlighter assessed pt.     Patient transferred to: [facility]  Admitted from: [facility]  Arrived by: [facility] via [facility]  Reason for admission: sepsis  Patient accompanied by:  and transport  Belongings: with patient   Teaching: done per unit protocol        "

## 2017-08-08 NOTE — H&P
[facility] History and Physical    Amelia Michel MRN# [id number]   Age: 56 year old YOB: 1960     Date of Admission:  8/8/2017    Home clinic:   Primary care provider: Comfort Sabillon          Assessment and Plan:     56 year old female with DLBCL, s/p R-EPOCH, presenting from ARU for low grade fever and gram positive bacteremia.  She also has bilateral neck swelling, seems to have parotitis.     ID Gram Positive Bacteremia-  patient noted to have  Fever Tmax 100 8/7 pm  Was Started on meropenem. Blood and urine cultures were sent, no new sob,n/v/d, urine + hematuria.  Blood cultures x 2 sites now + for Gram positive cocci? Line associated, but both peripheral and PIcc line cultures grew at same time.   -will continue alysha(has PCN allergy as child, with mouth swelling). Add vancomycin due to gram positive.  Repeat blood cultures.   Obtain CXR.  UA clean.  She has bilateral parotitis on exam. Do not feel strongly about abscess based on clinical exam, but she is neutropenic, would like to obtain CT neck. Also rule out Mumps.   She also extravasation wound in LUE, that is being cleaned and dressed every other day. No evidence of spreading infection there on exam.   Remove picc if no other clear focus identified.         Diffuse Large B-Cell Lymphoma- Stage 4 with bone marrow and liver involvement.  - Completed Cycle 1 of dose adjusted R-EPOCH (Day 1=7/28/17). Tolerated well overall, (hypotension SOB with Rituxan).  -Continued daily Neupogen   PPX- hold Levaquin, continue Fluconazole and acyclovir        Atrial fibrillation/flutter  Rate controlled with atenolol and digoxin. Currently in NSR with HR in the 70s.   -- No anticoagulation due to thrombocytopenia  - continue Digoxin 125 mcg  daily and Atenolol 25 mg daily       Recent out of hospital cardiac arrest (5/29/17)- Etiology remains unclear, possibly related to AV prieto blocking agents.  She meets criteria for secondary prevention ICD.  Placement is currently on hold given LUE extravasation wound, also pending treatment and prognosis of lymphoma.  EP discussed risk vs benefit of Lifevest, she would like to defer at this time  -- Plan to see Dr. Dickson (cardiology) in clinic in 2-3 months      Anasarca, volume overload- with significant diuresis since 8/4 weight continues to down trend 123 lbs 8/7.  With 6.5 liters out 8/5.   - trend BMP  -diuretics discontinued, due to parotitis which can happen with dehydration. Check BMP, encourage oral intake. May need to add IVF hydration if oral intake inadequate.       Mouth Sores- noted on left lateral tongue 8/7.  White based sores with minimal surrounding redness, painful and interfering with intake  -continued magic mouth wash         Hyperuricemia- Continued Allopurinol.       H/oSaddle anesthesia, urinary incontinence. Pt reports this is baseline for her. Unclear etiology. She has been noted to have an S1 radiculopathy. Seen by Neuro( Dr. Bhatia) dated 8/1 for details   -Suggested urodynamic study on outpatient basis  -plan to investigate CNS involvement for lymphoma      Rheumatoid arthritis -History of seropositive rheumatoid arthritis (anti-CCP positive) since late 1980;s w/ multiple MTP osteotomies, partial right wrist fusion, longstanding therapy consisting of MTX, prednisone and HCQ  - Tapered to 5 mg prednisone on 7/17 held during stay resumed- per oncology-  5mg daily starting 8/4 continued during ARU stay.  - Follow-up with [facility] Dr. Conor Cunha in 4-6 weeks after discharge      Steroid induced hyperglycemia.. Glucose stable. (106-161)   DC sliding scale, monitor glucose daily with BMP.      Extravasation-related LUE wound. Slowly improving per report continued with wound care:  Clean wound and skin around wound with microklenz.  Paint Cavilon no sting barrier to skin around wound.  Apply medihoney nickel thick to areas with slough.  Cover with two mepilex border  dressings.   Dressed every other day by wound care at ARU. Will consult wound care team in am.         Aortic masses. BRE on 7/14 showed 3 small masses on the coronary cusps and LVOT. Do not believe this to be endocarditis; no antibiotics at this time. Unclear what this represents; per cardiology- possibly could be Libmann-Sacks.  -follow per cardiology         LFT derangement- 2/2 lymphoma involvement s/p liver biopsy 7/21. ALT/AST significantly improved from prior. 8/7: Alk phos 189(232),  (108), AST 63 (75) Bilirubin 0.6 stable  -continue to trend  -no ab pain.         Chief Complaint:   Fever and bacteremia.     History is obtained from the patient and electronic health record    56 year old female with DLBCL, just completed R-EPOCH last week and was transferred to ARU. She developed a low grade fever yesterday and blood cultures were done, one peripherally and one from Picc. Both grew GPCs in chains today.   She noticed some swelling in upper neck on both sides yesterday and continues to have it today. Has mucositis and sore throat. Denies any cough, SOB, CP, abdominal pain, NVD. She has brand catheter. Edema is much better. No headache. Denies any other symptoms on full ROS.         Cancer Treatment History:   DLBCL R-EPOCH         Past Medical History:     Past Medical History:   Diagnosis Date     Hypertension      Rheumatoid arthritis(714.0)             Past Surgical History:      Past Surgical History:   Procedure Laterality Date     ANESTHESIA CARDIOVERSION N/A 5/17/2017    Procedure: ANESTHESIA CARDIOVERSION;  ANESTHESIA CARDIOVERSION;  Surgeon: GENERIC ANESTHESIA PROVIDER;  [facility]     ARTHRODESIS WRIST  2000    Right wrist     BONE MARROW ASPIRATE &BIOPSY  7/12/2017          FOOT SURGERY      4 left and 2 right     RELEASE CARPAL TUNNEL BILATERAL       REPAIR VENTRICULAR SEPTAL DEFECT  1969            Social History:     Social History   Substance Use Topics     Smoking status: Never  Smoker     Smokeless tobacco: Never Used     Alcohol use Yes      Comment: Glass of wine two evenings a night            Family History:     Family History   Problem Relation Age of Onset     Breast Cancer Mother      Hypertension Mother      Alzheimer Disease Mother      Hypertension Father      Blood Disease Father      Lymphoa     Circulatory Father      A Fib     DIABETES Paternal Grandmother      Family history          Immunizations:     Immunization History   Administered Date(s) Administered     Pneumococcal 23 valent 08/31/2011     TDAP Vaccine (Adacel) 02/08/2011            Allergies:     Allergies   Allergen Reactions     Metoprolol Other (See Comments)     Pt and  report that metoprolol does not work for her and also reports feeling unwell with this medication. She has been able to tolerate atenolol, which as worked in controlling her HR.      No Clinical Screening - See Comments      Penicillin Allergy Skin Test not performed, see antimicrobial management team progress note 7/5/17.       Penicillins      Tape [Adhesive Tape] Rash            Medications:       Current Facility-Administered Medications on File Prior to Encounter:  [DISCONTINUED] furosemide (LASIX) tablet 20 mg   [DISCONTINUED] furosemide (LASIX) tablet 20 mg   [DISCONTINUED] spironolactone (ALDACTONE) half-tab 12.5 mg   [DISCONTINUED] magic mouthwash suspension (diphenhydramine, lidocaine, aluminum-magnesium & simethicone)   [DISCONTINUED] meropenem (MERREM) 1 g vial to attach to  mL bag   [DISCONTINUED] filgrastim 15 mcg/mL (in Dextrose) (NEUPOGEN) infusion 480 mcg   [DISCONTINUED] senna-docusate (SENOKOT-S;PERICOLACE) 8.6-50 MG per tablet 1-2 tablet   [DISCONTINUED] levofloxacin (LEVAQUIN) tablet 250 mg   [DISCONTINUED] acetaminophen (TYLENOL) tablet 650 mg   [DISCONTINUED] acyclovir (ZOVIRAX) tablet 400 mg   [DISCONTINUED] allopurinol (ZYLOPRIM) tablet 300 mg   [DISCONTINUED] atenolol (TENORMIN) tablet 25 mg    [DISCONTINUED] calcium-vitamin D (CALTRATE) 600-400 MG-UNIT per tablet 2 tablet   [DISCONTINUED] cetirizine (zyrTEC) tablet 10 mg   [DISCONTINUED] digoxin (LANOXIN) tablet 125 mcg   [DISCONTINUED] fluconazole (DIFLUCAN) tablet 200 mg   [DISCONTINUED] folic acid (FOLVITE) tablet 1 mg   [DISCONTINUED] gabapentin (NEURONTIN) capsule 200 mg   [DISCONTINUED] melatonin tablet 1-3 mg   [DISCONTINUED] morphine 0.1% in solosite topical gel 2 g   [DISCONTINUED] multivitamin, therapeutic with minerals (THERA-VIT-M) tablet 1 tablet   [DISCONTINUED] potassium chloride SA (K-DUR/KLOR-CON M) CR tablet 40 mEq   [DISCONTINUED] predniSONE (DELTASONE) tablet 5 mg   [DISCONTINUED] thiamine tablet 50 mg   [DISCONTINUED] traMADol (ULTRAM) half-tab 25 mg   [DISCONTINUED] glucose 40 % gel 15-30 g   [DISCONTINUED] dextrose 50 % injection 25-50 mL   [DISCONTINUED] glucagon injection 1 mg   [DISCONTINUED] insulin aspart (NovoLOG) inj (RAPID ACTING)   [DISCONTINUED] insulin aspart (NovoLOG) inj (RAPID ACTING)   [DISCONTINUED] naloxone (NARCAN) injection 0.1-0.4 mg   [DISCONTINUED] sodium chloride (PF) 0.9% PF flush 20 mL   [DISCONTINUED] heparin lock flush 10 UNIT/ML injection 5-10 mL   [DISCONTINUED] heparin lock flush 10 UNIT/ML injection 5-10 mL   [DISCONTINUED] sodium chloride (PF) 0.9% PF flush 10-20 mL   [DISCONTINUED] potassium chloride SA (K-DUR/KLOR-CON M) CR tablet 20-40 mEq   [DISCONTINUED] potassium chloride (KLOR-CON) Packet 20-40 mEq   [DISCONTINUED] potassium chloride 10 mEq in 100 mL intermittent infusion   [DISCONTINUED] potassium chloride 10 mEq in 100 mL intermittent infusion with 10 mg lidocaine   [DISCONTINUED] potassium chloride 20 mEq in 50 mL intermittent infusion     Current Outpatient Prescriptions on File Prior to Encounter:  filgrastim 15 mcg/mL, in Dextrose, (NEUPOGEN) 15 mcg/ml Inject 32 mLs (480 mcg) into the vein daily   levofloxacin (LEVAQUIN) 250 MG tablet Take 1 tablet (250 mg) by mouth daily   magic  mouthwash suspension (diphenhydramine, lidocaine, aluminum-magnesium & simethicone) Swish and spit 10 mLs in mouth 4 times daily (before meals and nightly)   meropenem (MERREM) 1 G vial Inject 1,000 mg (1 g) into the vein every 8 hours   furosemide (LASIX) 40 MG tablet Take 0.5 tablets (20 mg) by mouth 2 times daily   acetaminophen (TYLENOL) 325 MG tablet Take 2 tablets (650 mg) by mouth every 4 hours as needed for mild pain   acyclovir (ZOVIRAX) 400 MG tablet Take 1 tablet (400 mg) by mouth 2 times daily   allopurinol (ZYLOPRIM) 300 MG tablet Take 1 tablet (300 mg) by mouth daily   atenolol (TENORMIN) 50 MG tablet Take 0.5 tablets (25 mg) by mouth daily   Calcium Carb-Cholecalciferol 600-800 MG-UNIT TABS Take 2 tablets by mouth every morning   digoxin (LANOXIN) 250 MCG tablet Take 0.5 tablets (125 mcg) by mouth daily   senna-docusate (SENOKOT-S;PERICOLACE) 8.6-50 MG per tablet Take 1-2 tablets by mouth 2 times daily   predniSONE (DELTASONE) 5 MG tablet Take 1 tablet (5 mg) by mouth daily   multivitamin, therapeutic with minerals (THERA-VIT-M) TABS tablet Take 1 tablet by mouth daily   melatonin 1 MG TABS tablet Take 1-3 tablets (1-3 mg) by mouth nightly as needed for sleep   insulin aspart (NOVOLOG PEN) 100 UNIT/ML injection Inject 1-5 Units Subcutaneous At Bedtime   insulin aspart (NOVOLOG PEN) 100 UNIT/ML injection Inject 1-7 Units Subcutaneous 3 times daily (before meals)   gabapentin (NEURONTIN) 100 MG capsule Take 2 capsules (200 mg) by mouth 3 times daily   folic acid (FOLVITE) 1 MG tablet Take 1 tablet (1 mg) by mouth daily   fluconazole (DIFLUCAN) 200 MG tablet Take 1 tablet (200 mg) by mouth daily   ascorbic acid 500 MG TABS Take 1 tablet (500 mg) by mouth daily   cetirizine (ZYRTEC) 10 MG tablet Take 1 tablet (10 mg) by mouth daily as needed for allergies   morphine 0.1% in solosite topical gel Place 2 g onto the skin 3 times daily as needed   potassium chloride SA (K-DUR/KLOR-CON M) 20 MEQ CR tablet Take  2 tablets (40 mEq) by mouth daily   thiamine 50 MG TABS Take 1 tablet (50 mg) by mouth daily   traMADol (ULTRAM) 50 MG tablet Take 0.5 tablets (25 mg) by mouth every 6 hours as needed for moderate pain   zinc sulfate (ZINCATE) 220 (50 ZN) MG capsule Take 1 capsule (220 mg) by mouth daily   [DISCONTINUED] furosemide (LASIX) 40 MG tablet Take 1 tablet (40 mg) by mouth 2 times daily              Review of Systems:   The Review of Systems is negative other than noted in the HPI         Physical Exam:   Temp: 99.7  F (37.6  C) Temp src: Axillary BP: 171/82     Resp: 18 SpO2: 98 % O2 Device: None (Room air)      Constitutional: Awake, alert, cooperative, no apparent distress, and appears stated age.  Eyes: Lids and lashes normal, pupils equal, round and reactive to light, extra ocular muscles intact, sclera clear, conjunctiva normal.  ENT: Normocephalic, without obvious abnormality, atramatic, oropharygeal erythema, white based ulcer L side of tongue.  Neck: Supple, symmetrical, trachea midline, no adenopathy, bilateral tender parotid swelling.   Lungs: No increased work of breathing, good air exchange, clear to auscultation bilaterally, no crackles or wheezing.  Cardiovascular: Regular rate and rhythm, normal S1 and S2, no S3 or S4, and no murmur noted.  Abdomen: normal bowel sounds, soft, non-distended, non-tender, no masses palpated, no hepatosplenomegally.  Genitourinary: deferred.  Musculoskeletal: No redness, warmth, or swelling of the joints.  No edema, lymphedema socks both legs. Two wounds left upper extremities from extravasation, 1*2 cm with white based, no spreading cellulitis.   Neurologic: Awake, alert, oriented to name, place and time.  No gross focal deficits.   Skin: No rashes, erythema, pallor, petechia or purpura.           Data:     Lab Results   Component Value Date    WBC 0.1 (LL) 08/08/2017    WBC 0.2 (LL) 08/07/2017    WBC 0.3 (LL) 08/06/2017    HGB 7.7 (L) 08/08/2017    HGB 8.0 (L) 08/07/2017     HGB 8.2 (L) 08/06/2017    HCT 23.6 (L) 08/08/2017    HCT 24.7 (L) 08/07/2017    HCT 24.8 (L) 08/06/2017    PLT 3 (LL) 08/08/2017    PLT 15 (LL) 08/07/2017    PLT 11 (LL) 08/06/2017     08/07/2017     08/05/2017     (L) 08/04/2017    POTASSIUM 4.2 08/07/2017    POTASSIUM 4.0 08/06/2017    POTASSIUM 4.0 08/05/2017    CHLORIDE 95 08/07/2017    CHLORIDE 90 (L) 08/05/2017    CHLORIDE 90 (L) 08/04/2017    CO2 33 (H) 08/07/2017    CO2 34 (H) 08/05/2017    CO2 33 (H) 08/04/2017    BUN 23 08/07/2017    BUN 33 (H) 08/05/2017    BUN 34 (H) 08/04/2017    CR 0.49 (L) 08/07/2017    CR 0.51 (L) 08/06/2017    CR 0.50 (L) 08/05/2017    GLC 76 08/07/2017     (H) 08/05/2017     (H) 08/04/2017    SED 63 (H) 06/19/2017    SED 34 (H) 05/30/2017    SED 22 05/29/2017    DD 1.3 (H) 07/08/2017    DD 4.9 (H) 06/01/2017    DD 6.0 (H) 05/31/2017    NTBNPI 2348 (H) 06/26/2017    NTBNPI 1761 (H) 06/24/2017    NTBNPI 3326 (H) 06/19/2017    TROPONIN 0.19 (HH) 05/29/2017    TROPI 0.024 06/24/2017    TROPI 0.381 (HH) 05/30/2017    TROPI 0.457 (HH) 05/30/2017    AST 63 (H) 08/07/2017    AST 69 (H) 08/05/2017    AST 75 (H) 08/04/2017     (H) 08/07/2017     (H) 08/05/2017     (H) 08/04/2017    ALKPHOS 189 (H) 08/07/2017    ALKPHOS 226 (H) 08/05/2017    ALKPHOS 232 (H) 08/04/2017    BILITOTAL 1.2 08/07/2017    BILITOTAL 1.1 08/05/2017    BILITOTAL 1.2 08/04/2017    INR 1.21 (H) 08/07/2017    INR 1.42 (H) 08/04/2017    INR 1.44 (H) 08/02/2017     -  -     Attestation:  -Sheldon Soto MD

## 2017-08-08 NOTE — PLAN OF CARE
Acute Rehab Care Conference/Team Rounds      Type: Team Rounds    Present: Dr Mary Babcock, Flory Agarwal PA, Aminta Weiss OT, Ruben Irizarry SLP, Lali Boothe PT, Gala Rutherford Social Work, Bree Herr Dietician, Laurie Curran , Marielena Kovacs RN        Discharge Barriers/Treatment/Education    Rehab Diagnosis: severe debility with critical illness myopathy    Active Medical Co-morbidities/Prognosis:very complicated medical issues with new bacteremia     Safety: Uses call light appropriately, able to make needs known. Neutropenic precautions as well as ESBL precautions.    Pain: No complaints. Tylenol and tramadol available    Medications, Skin, Tubes/Lines: Right double lumen PICC patent and intact. Wounds on upper left arm. Surgical incision left lower back    Swallowing/Nutrition: on regular diet; limited oral intake     Bowel/Bladder: Huynh catheter is patent and intact. Continent of bowel, last BM 8/7/17. Senna available    Psychosocial: Pt resides with her . Goal to return home with continued support from family. Team working towards a safe d/c plan.     ADLs/IADLs: Pt has made nice progress in therapy but limited d/t fatigue. Set schedule has been initiated starting today to assist with fatigue management. Therapy is focusing on building endurance/activity tolerance and progressing pt to mod I with basic adls. Pt does not have support at home d/t  working during the day. Skilled OT needed to address remainder of deficits in order for a safe return home with . DME: shower chair    Mobility: Pt is limited by low blood counts and limitations on activity because the low blood counts, and fatigue.  PT is motivated and pleasant. Pt is transferring generally with sba with FWW, amb distances from about 50 -100 ft with seated rests, slow with FWW, gardner by overall fatigue, LE weakness. Stairs NT due to weakness, fatigue, low platelets and Hgb. Pt has 3 steps into her home. PT to cont  to build endurance, strength for stairs, ambulation, daily routine. Melony 31/56 on 8/6/17.  Pt's  states she will be home alone part of the day, and he is concerned about her going home too soon. DME: family has a 4ww, may need a FWW (depends on pt preference and safety) and a manual wc for longer distances. Home PT recommended.     Cognition/Language:    Community Re-Entry: Pt would need a manual wc for longer distances due to medical condition and fatigue.     Transportation: Pt will need family or friend to take her to appointments.     Decision maker: self    Plan of Care and goals reviewed and updated.    Discharge Plan/Recommendations    Fall Precautions: continue    Overall plan for the patient: Amelia was admitted on Friday 8/4 and has been participating well. Main barrier seems to be her poor endurance and medical issues requiring daily transfusion, and close monitoring. Unfortunately she developed new bacteremia and was transferred back to the hospital. Will re-assess her rehab potentials when medically appropriate.       Utilization Review and Continued Stay Justification    Medical Necessity Criteria:    For any criteria that is not met, please document reason and plan for discharge, transfer, or modification of plan of care to address.    Requires intensive rehabilitation program to treat functional deficits?: Yes    Requires 3x per week or greater involvement of rehabilitation physician to oversee rehabilitation program?: Yes    Requires rehabilitation nursing interventions?: Yes    Patient is making functional progress?: Yes    There is a potential for additional functional progress? Yes    Patient is participating in therapy 3 hours per day a minimum of 5 days per week or 15 hours per week in 7 day period?:Yes    Has discharge needs that require coordinated discharge planning approach?:Yes      Final Physician Sign off    Statement of Approval: I agree with all the recommendations detailed in  this document.      Patient Goals      1. Pt will d/c home/community setting upon completion of therapy/RN goals.  2. Pt and family will be involved in d/c planning and made aware of services available to meet pts needs.     OT target date for goal attainment: 08/13/17  OT Frequency: daily  OT goal: hygiene/grooming: independent, while standing  OT goal: upper body dressing: Independent, including set-up/clothing retrieval  OT goal: lower body dressing: Modified independent, using adaptive equipment, including set-up/clothing retrieval  OT goal: upper body bathing: using adaptive equipment, Modified independent  OT goal: lower body bathing: Modified independent, using adaptive equipment  OT Goal: transfer: Modified independent, with assistive device  OT goal: toilet transfer/toileting: Independent, cleaning and garment management, toilet transfer  OT goal: meal preparation: Independent, with simple meal preparation, ambulatory level  OT goal: home management: Independent, with light demand household tasks, ambulatory level  OT goal: cognitive: Patient/caregiver will verbalize understanding of cognitive assessment results/recommendations as needed for safe discharge planning       PT target date for goal attainment: 08/15/17  PT Frequency: daily  PT goal: bed mobility: Independent, Supine to/from sit  PT goal: transfers: Independent, Sit to/from stand, Bed to/from chair  PT goal: gait: Modified independent, Rolling walker, 150 feet (level surfaces)  PT goal: stairs: Modified independent, 3 stairs, Supervision/stand-by assist  PT goal: perform aerobic activity with stable cardiovascular response: continuous activity, 20 minutes, NuStep, ambulation     PT goal 1: Pt will perform car tx with FWW and close sba.   PT Goal 2: Pt will be able to correctly verbalize lab values and implications on exercise   PT Goal 3: Pt will demo > 45/56 on BBS or <13.5 sec TUG or DGI > 19/24      nursing goals     Goal: Wound Management:  Patient will demonstrate understanding on wound care and signs and symptoms of infection before discharge.  Goal: Medical Management: Patient will demonstrate understanding on blood glucose management before discharge.    Patient/Family Goal: Medication Management: Patient will demonstrate understanding on medications before discharge by participating in MAP.

## 2017-08-08 NOTE — PLAN OF CARE
Patient alert and oriented. Platelet and hemoglobin and WBC are low. Continues on neutropenic precautions, requiring masking when entering room. Huynh in place. Urine dark anselmo in color. UA/UC obtained and results pending. Blood sugars monitored with results 106 & 148 with insulin given as per orders. Platelet count 3 this AM. 1 unit infused this shift without concerns, requiring one additional unit prior to end of shift with re-check level at 1700. Continue to replace if <10,000 as directed. Hemoglobin level this AM 7.7, 1PRBCs infusing at this time without concerns. Tempt remains stable. Will infuse last unit of platelets (2nd unit) approx 1575-2716. Re-check hgb tomorrow as per MD. Takes medications one at a time. Facial edema present. MD updated and new orders for lasix as directed. From time lasix was given this afternoon approx 1130 until 1400, urine output was 1300mL. Per MD: if any worsening symptoms, concerns, or increased tempt to notify MD immediately to be assessed and transferred out of unit R/T instability. Staff to monitor closely. See order. Continues on strict I&O. Requires A1 for all ADLs, transfers, ambulation and toileting needs. Blood cultures pending. IV ABX infused this AM without concerns. PICC to RUE. Taking magic mouth wash with toothette for oral sores.  at bedside and supportive. Continues on contact precautions as well R/T ESBL. Patient lethargic, therapies on HOLD today R/T low blood level counts. Patient pleasant and cooperative. Able to make needs known. Wound to LUE completed as per orders. Due every other day. Will continue to observe.     Temp: 97.1  F (36.2  C) Temp src: Axillary BP: 145/69 Pulse: 69   Resp: 16 SpO2: 97 % O2 Device: None (Room air)   Addendum 1400: Lab called with critical results of positive culture, both from purple port and right hand peripheral growing Gram positive cocci in chains. CRISTA kiser notified who will update MD Mathews at this time. Pending  transfer plan possible. Final unit of (2nd) platelets infusing at this time, due to be done approx 1445. Tempt stable throughout shift. Re-check platelet level as directed at 1700.

## 2017-08-08 NOTE — PROGRESS NOTES
"  [facility], [place]   Hospitalist Daily Progress Note                                                 Date of Admission:8/4/2017  ___________________________________  INTERVAL HISTORY (24 Hrs)/SUBJECTIVE:   Last 24 hr events, care team notes reviewed.     Last night had fever of 100 and was started on meropenem, felt cold but denies any other symptom. No cough/ no dysuria/ no bowel issue.   Feels face is puffier. ? LN may be enlarged.   No new rash as per her  ROS: 4 point ROS neg other than the symptoms noted above in the interval history.    OBJECTIVE :   VITALS:    Temp:  [95.8  F (35.4  C)-100  F (37.8  C)] 96.4  F (35.8  C)  Pulse:  [67-91] 67  Resp:  [16-17] 16  BP: (140-154)/(53-63) 147/56  SpO2:  [96 %-98 %] 98 %     Wt Readings from Last 5 Encounters:   08/08/17 55.8 kg (123 lb 1.6 oz)   08/04/17 66 kg (145 lb 9.6 oz)   06/19/17 75.7 kg (166 lb 12.8 oz)   06/16/17 72.8 kg (160 lb 8 oz)   05/23/17 64.4 kg (141 lb 15.6 oz)      PHYSICAL EXAM:  /56  Pulse 67  Temp 96.4  F (35.8  C) (Axillary)  Resp 16  Ht 1.473 m (4' 10\")  Wt 55.8 kg (123 lb 1.6 oz)  SpO2 98%  BMI 25.73 kg/m2  General: pleasant.   HEENT: AT/NC,  Moist MM  Respi/Chest: Non labored.cta bl  CVS/Heart: S1S2 regular,   Gi/Abd: Soft, non tender, non distended,   Ext: bl 1-2+ pedal edema (improving).   Data:   All laboratory and imaging data in the past 24 hours reviewed:    LABS:  CMP  Recent Labs  Lab 08/07/17  0541 08/06/17  0825 08/06/17  0547 08/05/17  0659 08/04/17  1605 08/04/17  0204  08/03/17  0309  08/02/17  0402     --   --  134 131* 131*  < > 135  < > 139   POTASSIUM 4.2  --  4.0 4.0 4.6 3.8  < > 4.2  < > 2.9*   CHLORIDE 95  --   --  90* 90* 89*  < > 94  < > 96   CO2 33*  --   --  34* 33* 36*  < > 36*  < > 35*   ANIONGAP 9  --   --  10 8 6  < > 5  < > 9   GLC 76  --   --  159* 223* 233*  < > 199*  < > 191*   BUN 23  --   --  33* 34* 34*  < > 30  < > 27   CR 0.49*  --  0.51* 0.50* 0.53 " 0.51*  < > 0.54  < > 0.50*   GFRESTIMATED >90Non  GFR Calc  --  >90Non  GFR Calc >90Non  GFR Calc >90Non  GFR Calc >90Non  GFR Calc  < > >90Non  GFR Calc  < > >90Non  GFR Calc   GFRESTBLACK >90African American GFR Calc  --  >90African American GFR Calc >90African American GFR Calc >90African American GFR Calc >90African American GFR Calc  < > >90African American GFR Calc  < > >90African American GFR Calc   VIKTORIYA 9.0  --   --  8.8 8.4* 8.5  < > 8.2*  < > 7.6*   MAG 2.2 2.0  --  2.0  --  2.2  < > 2.4*  < > 2.0   PHOS 3.5  --   --   --   --  3.1  --  2.3*  --  2.9   PROTTOTAL 6.2*  --   --  6.1*  --  5.9*  --  5.7*  --  5.7*   ALBUMIN 3.3*  --   --  3.3*  --  3.1*  --  2.9*  --  2.8*   BILITOTAL 1.2  --   --  1.1  --  1.2  --  1.2  --  1.3   ALKPHOS 189*  --   --  226*  --  232*  --  234*  --  261*   AST 63*  --   --  69*  --  75*  --  69*  --  82*   *  --   --  119*  --  108*  --  91*  --  86*   < > = values in this interval not displayed.  CBC    Recent Labs  Lab 17  0548 17  0541 17  0547 17  1012   WBC 0.1* 0.2* 0.3* 0.4*   RBC 2.64* 2.73* 2.74* 2.92*   HGB 7.7* 8.0* 8.2* 8.5*   HCT 23.6* 24.7* 24.8* 26.5*   MCV 89 91 91 91   MCH 29.2 29.3 29.9 29.1   MCHC 32.6 32.4 33.1 32.1   RDW 23.2* 23.3* 23.3* 23.5*   PLT 3* 15* 11* 10*     INR    Recent Labs  Lab 17  0541 17  0204 17  0402   INR 1.21* 1.42* 1.44*     Unresulted Labs Ordered in the Past 30 Days of this Admission     Date and Time Order Name Status Description    2017 0905 Urine Culture Aerobic Bacterial Preliminary     2017 Blood culture Preliminary     2017 Blood culture Preliminary     2017 0115 Transfusion reaction evaluation In process     2017 0115 Transfusion reaction evaluation In process           EK/4: Sinus shelton, HR 55, QTc 440  Echo 7/10/17:  Technically  difficult study. Limited study.  Borderline (EF 50-55%) reduced left ventricular function is present.  Global right ventricular function is mildly reduced.  Moderate tricuspid insufficiency is present. This is unchanged from prior, and  was determined to be from prolapse on prior BRE.  No vegetations seen on this challenging study    ASSESSMENT & PLAN :    Amelia Michel is a 56 year old female with complex past medical history of prior VSD repair, rheumatoid arthritis on long-term methotrexate, recent dx atrial fibrillation/flutter/SVT who was recently admitted to [facility] on 5/29 after an out of hospital cardiac arrest thought to be related to AV prieto blocking agents (angio normal). She made a full recovery and was discharged to rehab on 6/15 with a lifevest. Readmitted from rehab 06/19/17 for FUO & afib w rvr. With workup of fever and progressive cytopenias, diagnosis of DLBCL was made. Transferred to the Hematology service on 7/26. s/p Cycle 1 R-EPOCH.      Transferred to ARU 8/4/2017 for her ongoing rehab needs and other cares. Medicine consulted for medical co-management.      HEME/ONC:       # Diffuse Large B-Cell Lymphoma. Stage 4 with bone marrow and liver involvement. IPI 4. Possibly related to prior methotrexate. PET/CT 7/18 revealing for mediastinal and neck level 2A lymphadenopathy, extensive FDG-avid bony lesions, hepatosplenic FDG-avid lesions, pelvic lesions contingous with the uterus, and bilateral pleural effusions. Bone marrow biopsy completed on 7/12 showed scattered plasma cells in perivascular clusters, with no definitive staining of B-cell population by CD5; on re-review, possibly consistent with intravascularge large B-cell lymphoma. However, liver biopsy (7/21) consistent with DLBCL, negative for BCL2/BCL6/MYC rearrangements, CD20 positive. PICC line placed and kept in place on discharge. Completed Cycle 1 of dose adjusted R-EPOCH (Day 1=7/28/17). Tolerated well overall, except for reaction  to Rituxan (hypotension with BP 87/45, SOB/feeling of ?throat swelling/difficulty getting breath in; then fever/rigors).   Last dose 08/01.   - patient with worsening neutropenia wbc: 0.4 ->0.3->0.2 --> 0.1  - ct Neupogen. Dose increased from 300 to 480 mcg/day on 8/6   Thrombocytopenia:  Rash stable.   - today 3! - will give 2 U platelet and recheck    - Transfusion goals:  Transfuse to maintain Hgb >8.0, platelets >=10,000. (can increase platelet goals to 20 K for new bruising). therapy on HOLD due to low counts.   - Continue daily Neupogen until counts begin to recover and no longer neutropenic (ANC >1000).   - daily CBC w diff for now.   - Neutropenic precautions.     * Neck/ submandibular  puffiness- did not appreciate clinical LN enlargement. (reviewed PET and had 1 LN left side 1.1 cm) , will resume lasix low dose and monitor. If continues to be of concern- than for imaging     ID:   # PPX. continue ppx ACV, Levaquin,  Fluconazole.  #Persistent Fevers. -- Resolved. Likely related to lymphoma.   * 8/7 fever of 100- started on meropenem. D/W Hem-Onc- Continue . If any fever again or any clinical concern to transfer patient to [facility]     Other resolved/ stable:   #Lactic acidosis. -- Resolved. (Extensive infectious evaluation including blood cultures, which remain NGTD. Additionally, stool virus panel, M. Tuberculsosis, tick-borne illnesses, and bartonella/Q-fever have all been negative. Ultimately, fevers/lactate thought secondary to malignancy. Possible pneumonia while recently on meropenem (7/1-7/5); Transient rise in lactic acid during Rituxan infusion reaction (7/28).       Cardiovascular: prior vsd repair 1969     # Volume status, anasarca: agressively diuresed with hem/onc service and transferred on lasix 40 mg but has bee diuresing excessively so --  Patient diuresing a lot last 48 hours.     - 8/6/2017  Decrease lasix 20 mg bid, hold evening dose of lasix if UOP >2L during the day.   Continue  spironolactone 12.5 daily.     8/7/2017  - hold lasix and spironolactone.   Reassess daily     8/8: re-introduce lasix 20 mg bid    K, mg protocol.    -- I/O, frequent BMP.  - daily wt.      OTHER/ Stable:   # Paroxysmal a-fib/a flutter: Rate controlled with current atenolol 25 mg daily and digoxin 125 mcg daily.   -- No anticoagulation due to thromboycytopenia      # S/P recent out of hospital cardiac arrest (05/29/17): Etiology remains unclear, possibly related to AV prieto blocking agents.  -- She meets criteria for secondary prevention ICD. Placement is currently on hold given LUE extravasation wound, also pending treatment and prognosis of lymphoma.   -- EP discussed risk vs benefit of Lifevest prior to discharge from hospital, she would like to defer at this time  -- QTc 440 (08/04)  -- K, Mg protocol (keep above 4 and 2)  -- Plan to see Dr. Dickson in clinic in 2-3 months      GI:   #LFT derangement. 2/2 lymphoma involvement, recent chemo. Asymptomatic.   - Monitor LFT twice weekly at ARU      NEURO:  # 'saddle anesthesia' , urinary incontinence and paresthesias.    seen by Neurology in hospital  Rec:   She would benefit with a urodynamic study by Urology to know the pattern of her incontinence (detrusor hyperactivity X overflow) as an outpatient basis.   ?CSF studies to investigate CNS involvement in lymphoma. She refers that it is in the plan of the primary team in the future.        - could try trial to void after she is done with active diuresis or off diuretics as able.     RHEUM:   #Rheumatoid arthritis   History of seropositive rheumatoid arthritis (anti-CCP positive) since late 1980;s w/ multiple MTP osteotomies, partial right wrist fusion, longstanding therapy consisting of MTX, prednisone and HCQ. Tapered to 5 mg prednisone on 7/17 with continued monitoring for flaring. Prednisone was held with higher dose steroids during treatment plan. Resumed 5mg dose as of 8/4.     -- Follow-up with [facility]  Rheumatology clinic Dr. Conor Cunha in 4-6 weeks after discharge      ENDO:  #Steroid induced hyperglycemia. Continue med SSI. Monitor BS.       Recent Labs  Lab 08/08/17  0740 08/07/17  2125 08/07/17  1705 08/07/17  1145 08/07/17  0736 08/07/17  0541 08/06/17  2101  08/05/17  0659  08/04/17  1605  08/04/17  0204  08/03/17  1827  08/03/17  0309   GLC  --   --   --   --   --  76  --   --  159*  --  223*  --  233*  --  245*  --  199*   * 141* 161* 127* 90  --  162*  < >  --   < >  --   < >  --   < >  --   < >  --    < > = values in this interval not displayed.    DERM:   #Extravasation-related LUE wound. Slowly improving.  - Continue wound care  - WOCN consult.      Electrolytes abnormality:   Hypokalemia: 2/2 lasix. On po supplement. Monitor and replace as needed. Keep >4  Mg: keep above 2.0.        - follow up at oncology clinic 8/16      D/w RN. PM&R team, hem/Onc staff 8/8

## 2017-08-08 NOTE — PLAN OF CARE
Problem: Goal/Outcome  Goal: Goal Outcome Summary  OT-Pt was unable to be seen for both OT sessions today due to change in medical status and being on hold.

## 2017-08-08 NOTE — PLAN OF CARE
Problem: Goal/Outcome  Goal: Goal Outcome Summary  Alert and able to make needs known. On K, Mg, platelet and blood transfusion protocols with labs done today and no parameters for replacement met. Left arm QOD dressing remains CDI. RUE PICC patent, with IV meds administered without issues and heparin locked. Huynh patent and draining. Two loose BMs this shift with laxatives held.     At 2100 patient complained of chills with a low grade fever of 100. PRN Tylenol administered and PMR resident on call updated in the setting of neutropenia. Resident MD advised that hospitalist MD be updated.  MD Hartley updated ~2140 and ordered 2 site blood cultures, noted that he will update MD. Chino who is following the patient at 2300. Otherwise no other new orders at this time. Chills improved, recheck temp at ~2215 99.8. Patient updated on the plan of care. Continue with the plan of care.

## 2017-08-08 NOTE — PLAN OF CARE
Problem: Infection, Risk/Actual (Adult)  Goal: Identify Related Risk Factors and Signs and Symptoms  Related risk factors and signs and symptoms are identified upon initiation of Human [employer] Clinical Practice Guideline (CPG)  Outcome: Declining    08/08/17 1749   Infection, Risk/Actual   Infection, Risk/Actual: Related Risk Factors prolonged hospitalization;skin integrity impairment   Signs and Symptoms (Infection, Risk/Actual) cultures positive;weakness

## 2017-08-08 NOTE — PROGRESS NOTES
RN reported temp of 100 with chills. No other new s/s  Given severe neutropenia, inclined to treat with iv Meropenem (PCN allergy, has tolerated alysha prior) 1 gm tid.   BC sent  Vitals stable.   Monitor closely.   Review case with Hem/Onc in am.     Moonlighter.

## 2017-08-08 NOTE — PLAN OF CARE
Problem: Goal/Outcome  Goal: Goal Outcome Summary  MD Chino called back regarding patient's fever/chills and ordered Meropenem 1 gram q8h IV for neutropenic fever stat. Oncoming RN updated on plan.

## 2017-08-08 NOTE — DISCHARGE SUMMARY
[facility]   Acute Rehabilitation Unit  Discharge summary     Date of Admission: 8/4/2017  Date of Discharge: 8/8/2017  Disposition: return to [facility]  Primary Care Physician: Comfort Sabillon  Attending physician: Mary Babcock MD      discharge diagnosis  Gram positive bacteremia  Pancytopenia  DLBCL      brief summary  Amelia Michel is a 56 year old woman with a history of VSD repair (1969), Rheumatoid arthritis, and a recent diagnosis of Afib/Aflutter/SVT, who initially was admitted to [facility] on 5/29 after Out of Hospital cardiac arrest she was admitted from 5/29-6/15, extubated 6/4. Admitted to ARU 6/15,  for ongoing rehabilitation following prolonged hospitalization while at ARU (6/15-6/19), pt developed worsening LUE wound pain and fever, and was admitted back to [facility] for further workup. Underwent extensive workup in setting of fever and progressive cytopenias, ultimately diagnosed with Diffuse Large B-Cell Lymphoma s.o chemotherapy. Admitted to ARU 8/4/2017 for ongoing medical management and aggressive rehabilitation.       rehabilitaiton course  ADLs/IADLs: Pt has made nice progress in therapy but limited d/t fatigue. Set schedule has been initiated starting today to assist with fatigue management. Therapy is focusing on building endurance/activity tolerance and progressing pt to mod I with basic adls. Pt does not have support at home d/t  working during the day. Skilled OT needed to address remainder of deficits in order for a safe return home with . DME: shower chair     Mobility: Pt is limited by low blood counts and limitations on activity because the low blood counts, and fatigue.  PT is motivated and pleasant. Pt is transferring generally with sba with FWW, amb distances from about 50 -100 ft with seated rests, slow with FWW, gardner by overall fatigue, LE weakness. Stairs NT due to weakness, fatigue, low platelets and Hgb. Pt has 3 steps into her  home. PT to cont to build endurance, strength for stairs, ambulation, daily routine. Ty 31/56 on 8/6/17.  Pt's  states she will be home alone part of the day, and he is concerned about her going home too soon. DME: family has a 4ww, may need a FWW (depends on pt preference and safety) and a manual wc for longer distances. Home PT recommended.     mEDICAL COURSE  Followed by hospitalist team throughout ARU stay see note by Dr. Pennington 8/8 for details.     Gram Positive Bacteremia-  patient noted to have  Fever Tmax 100 8/7 pm  Was Started on meropenem per Dr. Chino. Blood and urine cultures were sent, no new sob,n/v/d, urine + hematuria.  Blood cultures x 2 sites now + for Gram positive cocci? Line associated,  -will continue alysha  -discharge to [facility] for further evaluation and medical management/monitoring       Diffuse Large B-Cell Lymphoma- Stage 4 with bone marrow and liver involvement.  Per Oncology Possibly related to prior methotrexate. - PET/CT 7/18 revealing for mediastinal and neck level 2A lymphadenopathy, extensive FDG-avid bony lesions, hepatosplenic FDG-avid lesions, pelvic lesions continuous with the uterus, and bilateral pleural effusions.    - Bone marrow biopsy 7/12: scattered plasma cells in perivascular clusters, with no definitive staining of B-cell population by CD5; on re-review, possibly consistent with intravascular large B-cell lymphoma, liver biopsy (7/21) consistent with DLBCL  - Completed Cycle 1 of dose adjusted R-EPOCH (Day 1=7/28/17). Tolerated well overall, (hypotension SOB with Rituxan).  -Continued daily Neupogen   PPX- continue Levaquin, Fluconazole  -seen by palliative team during hospitalization- will consult health psychology during ARU stay for ongoing emotional support (see note by Dr. Miguel 8/7 for details)      Pancytopenia, secondary to DLBCL. 8/3 WBC continues to down trend 0.1,  PLTS 3.  Hgb 7.7.   Case discussed with hospitalist (Dr. Pennington) who discussed with  oncology s/p 2 units PLTs, 1 unit PRBC today (8/8).       Atrial fibrillation/flutter  Rate controlled with atenolol and digoxin. Currently in NSR with HR in the 70s.   -- No anticoagulation due to thrombocytopenia  - continue Digoxin 125 mcg  daily and Atenolol 25 mg daily       Recent out of hospital cardiac arrest (5/29/17)- Etiology remains unclear, possibly related to AV prieto blocking agents.  She meets criteria for secondary prevention ICD. Placement is currently on hold given LUE extravasation wound, also pending treatment and prognosis of lymphoma.  EP discussed risk vs benefit of Lifevest, she would like to defer at this time  -- Plan to see Dr. Dickson (cardiology) in clinic in 2-3 months      Anasarca, volume overload- with significant diuresis since 8/4 weight continues to down trend 123 lbs 8/7.  With 6.5 liters out 8/5.   - trend BMP  -diuretics discontinued- restarted lasix 20 mg bid 8/8 in setting of increased swelling and multiple transfusions.   - 2g Na limit  - monitor daily weights- (down trended during ARU stay 123 lbs 8/8)     Mouth Sores- noted on left lateral tongue 8/7.  White based sores with minimal surrounding redness, painful and interfering with intake  -continued magic mouth wash         Hyperuricemia- Uric acid up to 8.2 (7/27 PM), s/p Rasburicase. Uric acid improved to 2.3  - Continued Allopurinol.      DIC. INR prolonged, now improving. received Vitamin K 10 mg PO x 3 days (7/28-7/30) 8/7 INR 1.2, Fibrinogen 248.   - conditional transfusion parameters in place: 2U plasma for INR >1.8, 5U cryo for fibrinogen <150.      Saddle anesthesia, urinary incontinence. Pt reports this is baseline for her. Unclear etiology. She has been noted to have an S1 radiculopathy. Seen by Neuro( Dr. Bhatia) dated 8/1 for details   -Suggested urodynamic study on outpatient basis  -plan to investigate CNS involvement for lymphoma      Rheumatoid arthritis -History of seropositive rheumatoid arthritis  (anti-CCP positive) since late 1980;s w/ multiple MTP osteotomies, partial right wrist fusion, longstanding therapy consisting of MTX, prednisone and HCQ  - Tapered to 5 mg prednisone on 7/17 held during stay resumed- per oncology-  5mg daily starting 8/4 continued during ARU stay.  - Follow-up with [facility]ology clinic Dr. Conor Cunha in 4-6 weeks after discharge      Steroid induced hyperglycemia.. Glucose stable. (106-161)   -continue to monitor if remains stable consider d/c glucose checks/SSI      Extravasation-related LUE wound. Slowly improving per report continued with wound care:  Clean wound and skin around wound with microklenz.  Paint Cavilon no sting barrier to skin around wound.  Apply medihoney nickel thick to areas with slough.  Cover with two mepilex border dressings.       Hypokalemia. 2/2 lasix  K.4.2 8/7  - Continue  to trend bmp-  -continue scheduled 40 mEq potassium      Aortic masses. BRE on 7/14 showed 3 small masses on the coronary cusps and LVOT. Do not believe this to be endocarditis; no antibiotics at this time. Unclear what this represents; per cardiology- possibly could be Libmann-Sacks.  -follow per cardiology         LFT derangement- 2/2 lymphoma involvement s/p liver biopsy 7/21. ALT/AST significantly improved from prior. 8/7: Alk phos 189(232),  (108), AST 63 (75) Bilirubin 0.6 stable  -continue to trend  -no ab pain  dISCHARGE MEDICATIONS  Current Discharge Medication List      START taking these medications    Details   levofloxacin (LEVAQUIN) 250 MG tablet Take 1 tablet (250 mg) by mouth daily  Refills: 0    Associated Diagnoses: Diffuse large B-cell lymphoma of extranodal site (H)      magic mouthwash suspension (diphenhydramine, lidocaine, aluminum-magnesium & simethicone) Swish and spit 10 mLs in mouth 4 times daily (before meals and nightly)    Associated Diagnoses: Diffuse large B-cell lymphoma of extranodal site (H)      meropenem (MERREM) 1 G vial Inject  1,000 mg (1 g) into the vein every 8 hours  Qty: 30 each    Associated Diagnoses: Gram-positive bacteremia         CONTINUE these medications which have CHANGED    Details   filgrastim 15 mcg/mL, in Dextrose, (NEUPOGEN) 15 mcg/ml Inject 32 mLs (480 mcg) into the vein daily    Associated Diagnoses: Diffuse large B-cell lymphoma of extranodal site (H)         CONTINUE these medications which have NOT CHANGED    Details   acetaminophen (TYLENOL) 325 MG tablet Take 2 tablets (650 mg) by mouth every 4 hours as needed for mild pain  Qty: 100 tablet    Associated Diagnoses: Rheumatoid arthritis involving multiple sites with positive rheumatoid factor (H)      acyclovir (ZOVIRAX) 400 MG tablet Take 1 tablet (400 mg) by mouth 2 times daily  Qty: 30 tablet    Associated Diagnoses: Diffuse large B-cell lymphoma of extranodal site (H)      allopurinol (ZYLOPRIM) 300 MG tablet Take 1 tablet (300 mg) by mouth daily  Qty: 30 tablet    Associated Diagnoses: Diffuse large B-cell lymphoma of extranodal site (H)      atenolol (TENORMIN) 50 MG tablet Take 0.5 tablets (25 mg) by mouth daily  Qty: 30 tablet    Associated Diagnoses: Paroxysmal atrial fibrillation (H); Atrial tachycardia (H); Other secondary hypertension      Calcium Carb-Cholecalciferol 600-800 MG-UNIT TABS Take 2 tablets by mouth every morning    Associated Diagnoses: Hypocalcemia      digoxin (LANOXIN) 250 MCG tablet Take 0.5 tablets (125 mcg) by mouth daily  Qty: 30 tablet    Associated Diagnoses: Atrial tachycardia (H); Paroxysmal atrial fibrillation (H)      senna-docusate (SENOKOT-S;PERICOLACE) 8.6-50 MG per tablet Take 1-2 tablets by mouth 2 times daily  Qty: 100 tablet    Associated Diagnoses: Constipation, unspecified constipation type      predniSONE (DELTASONE) 5 MG tablet Take 1 tablet (5 mg) by mouth daily    Associated Diagnoses: Cardiogenic shock (H); Rheumatoid arthritis involving multiple sites with positive rheumatoid factor (H)      multivitamin,  therapeutic with minerals (THERA-VIT-M) TABS tablet Take 1 tablet by mouth daily  Qty: 30 each, Refills: 0    Associated Diagnoses: Deficiency of nutrient element, unspecified      melatonin 1 MG TABS tablet Take 1-3 tablets (1-3 mg) by mouth nightly as needed for sleep    Associated Diagnoses: Insomnia, unspecified      !! insulin aspart (NOVOLOG PEN) 100 UNIT/ML injection Inject 1-5 Units Subcutaneous At Bedtime    Associated Diagnoses: Hyperglycemia      !! insulin aspart (NOVOLOG PEN) 100 UNIT/ML injection Inject 1-7 Units Subcutaneous 3 times daily (before meals)    Associated Diagnoses: Hyperglycemia      gabapentin (NEURONTIN) 100 MG capsule Take 2 capsules (200 mg) by mouth 3 times daily  Qty: 180 capsule, Refills: 3    Associated Diagnoses: Mononeuropathy      furosemide (LASIX) 40 MG tablet Take 1 tablet (40 mg) by mouth 2 times daily  Qty: 30 tablet    Associated Diagnoses: Generalized edema      folic acid (FOLVITE) 1 MG tablet Take 1 tablet (1 mg) by mouth daily  Qty: 30 tablet    Associated Diagnoses: Folic acid deficiency      fluconazole (DIFLUCAN) 200 MG tablet Take 1 tablet (200 mg) by mouth daily  Qty: 30 tablet    Associated Diagnoses: Diffuse large B-cell lymphoma of extranodal site (H)      ascorbic acid 500 MG TABS Take 1 tablet (500 mg) by mouth daily  Qty: 30 tablet    Associated Diagnoses: Deficiency of nutrient element, unspecified      cetirizine (ZYRTEC) 10 MG tablet Take 1 tablet (10 mg) by mouth daily as needed for allergies  Qty: 30 tablet    Associated Diagnoses: Rash and nonspecific skin eruption      morphine 0.1% in solosite topical gel Place 2 g onto the skin 3 times daily as needed  Refills: 0    Associated Diagnoses: Arm pain, left      potassium chloride SA (K-DUR/KLOR-CON M) 20 MEQ CR tablet Take 2 tablets (40 mEq) by mouth daily  Qty: 90 tablet    Associated Diagnoses: Deficiency of nutrient element, unspecified      thiamine 50 MG TABS Take 1 tablet (50 mg) by mouth  daily  Qty: 30 tablet    Associated Diagnoses: Deficiency of nutrient element, unspecified      traMADol (ULTRAM) 50 MG tablet Take 0.5 tablets (25 mg) by mouth every 6 hours as needed for moderate pain  Qty: 28 tablet    Associated Diagnoses: Arm pain, left      zinc sulfate (ZINCATE) 220 (50 ZN) MG capsule Take 1 capsule (220 mg) by mouth daily    Associated Diagnoses: Deficiency of nutrient element, unspecified       !! - Potential duplicate medications found. Please discuss with provider.      STOP taking these medications       spironolactone (ALDACTONE) 25 MG tablet Comments:   Reason for Stopping:         beta carotene 95878 UNIT capsule Comments:   Reason for Stopping:                 DISCHARGE INSTRUCTIONS AND FOLLOW UP    Discharge Procedure Orders  Reason for your hospital stay   Order Comments: Admitted for rehabilitation following hospitalization with new diagnosis of DBCL.  Discharged to [facility] for further workup/medical monitoring in setting of gram positive bacteremia.     Intake and output   Order Comments: Every shift     Daily weights   Order Comments: Call Provider for weight gain of more than 2 pounds per day or 5 pounds per week.     Huynh catheter   Order Comments: To straight gravity drainage. Change catheter every 2 weeks and PRN for leaking or decreased uring output with signs of bladder distention. DO NOT change catheter without a specific MD order IF diagnosis of benign prostatic hypertrophy (BPH), neurogenic bladder, or other urological conditions     IV access   Order Comments: PICC.     Activity - Up with nursing assistance   Order Specific Question Answer Comments   Is discharge order? Yes      Wound care   Order Comments: Left upper arm wounds: Clean wound and skin around wound with microklenz.  Paint Cavilon no sting barrier to skin around wound.  Apply medihoney nickel thick to areas with slough.  Cover with two mepilex border dressings.     Full Code     Physical Therapy Adult  Consult   Order Comments: Evaluate and treat as clinically indicated.    Reason:  debility     Occupational Therapy Adult Consult   Order Comments: Evaluate and treat as clinically indicated.    Reason:  debility     Contact Isolation     Fall precautions     Neutropenic (Protective) precautions     Advance Diet as Tolerated   Order Comments: 2 Grm NA   Order Specific Question Answer Comments   Is discharge order? Yes             physical examination    Most recent Vital Signs:   Vitals:    08/08/17 1202 08/08/17 1305 08/08/17 1340 08/08/17 1354   BP: 145/69 152/79 154/59 154/66   BP Location:       Pulse: 69 69 72 72   Resp: 16 16 16 16   Temp: 97.1  F (36.2  C) 95.8  F (35.4  C) 95.6  F (35.3  C) 96.6  F (35.9  C)   TempSrc: Axillary Axillary Axillary Axillary   SpO2: 97% 96% 97% 97%   Weight:       Height:       Exam:  Constitutional: alert NAD  Head/neck: distal facial and cervical swelling.   ENT: mucus membranes dry left lateral tongue sores with white base.   Cardiovascular: rrr  Respiratory: non labored on room air.   Gastrointestinal: soft non distended non tender.   : brand draining cloudy yellow urine  Skin: no suspicious lesions or rashes, scattered ecchymoses  Neurologic: alert moves all extremities.   Psychiatric: calm cooperative.  Hematologic/Lymphatic/Immunologic: scattered ecchymoses no new lesions, hematuria.       60 minutes spent in discharge, including >50% in counseling and coordination of care, medication review and plan of care recommended on follow up.     Patient was evaluated on day of discharge by attending physician, Mary Babcock MD, who agrees with plan of care.  It was our pleasure to care for Amelia Michel during this hospitalization. Please do not hesitate to contact me should there be questions regarding the hospital course or discharge plan.        Flory Agarwal PA-C   Rehab Medicine Service  [phone]

## 2017-08-08 NOTE — LETTER
Transition Communication Hand-off for Care Transitions to Next Level of Care Provider    Name: Amelia Michel  MRN #: [id number]  Primary Care Provider: Comfort Sabillon   Primary Clinic: [facility] [address] [facility] [address]    Hematologist: Dr. Rodriguez, [facility]     Reason for Hospitalization:  sepsis  Febrile neutropenia (H)  Admit Date/Time: 8/8/2017  5:00 PM  Discharge Date: 8/12/2017  Payor Source: Payor: PREFERREDONE / Plan: [employer] ADMIN SERV OPEN ACCESS / Product Type: PPO /     Amelia Michel is a 55 y/o female with PMH of RA, cardiac arrest, extravasation LUE wound, A fib/Flutter (not on anticoagulation r/t TCP), and DLBCL (now s/p 1 cycle R-EPOCH D1=7/28/17) who transferred from ARU on 8/8/2017 2/2 to low grade fever, GPC bacteremia (strep mitis), & parotitis (w/o abscess on CT).    [facility]  [facility]       Discharge Needs Assessment:  Needs       Most Recent Value    Anticipated Changes Related to Illness none    Equipment Currently Used at Home walker, rolling    Transportation Available agency transportation          Follow-up plan:  Future Appointments  Date Time Provider Department Center   8/15/2017 10:00 AM Soraya Oro PTA [facility]   8/15/2017 11:15 AM Luz Chambers OT [facility]   8/15/2017 1:15 PM Becky Pinedo [facility]   8/15/2017 2:30 PM Soraya Oro PTA [facility]   8/16/2017 11:00 AM  MASONIC LAB DRAW ONL [facility]   8/16/2017 11:30 AM  ONCOLOGY INFUSION UCONA [facility]   8/16/2017 12:15 PM Lenore Rodriguez MD [facility]   9/15/2017 10:00 AM Pj Cunha MD [facility]   9/27/2017 1:00 PM [facility]   9/27/2017 1:30 PM Juan Eaton MD [facility]       Any outstanding tests or procedures:        Referrals     Future Labs/Procedures    Occupational Therapy Adult Consult     Comments:    Evaluate and treat as clinically indicated.    Reason:  deconditioning    Physical Therapy Adult Consult      Comments:    Evaluate and treat as clinically indicated.    Reason:  deconditioning              Gloria Rhoades    AVS/Discharge Summary is the source of truth; this is a helpful guide for improved communication of patient story

## 2017-08-08 NOTE — PLAN OF CARE
Problem: Goal/Outcome  Goal: Goal Outcome Summary  FOCUS/GOAL  Bowel management, Bladder management, Pain management and Medical management     ASSESSMENT, INTERVENTIONS AND CONTINUING PLAN FOR GOAL:  Pt is alert and oriented. Huynh catheter is patent and intact, draining adequate amounts of anselmo urine. No BM, passing flatus. Denied pain overnight. Right double lumen PICC is patent and intact, antibiotic infused without difficulty. Temp at beginning of shift was 98.8 axillary. Pt has mouth sores, using Magic Mouthwash for this. Pt c/o gagging with the mouthwash, writer offered her toothettes to see if that helps with the gagging. Slept well between cares. Uses call light appropriately, able to make needs known. Will continue with POC.

## 2017-08-09 LAB
ALBUMIN SERPL-MCNC: 2.9 G/DL (ref 3.4–5)
ALP SERPL-CCNC: 149 U/L (ref 40–150)
ALT SERPL W P-5'-P-CCNC: 72 U/L (ref 0–50)
ANION GAP SERPL CALCULATED.3IONS-SCNC: 8 MMOL/L (ref 3–14)
AST SERPL W P-5'-P-CCNC: 19 U/L (ref 0–45)
BILIRUB SERPL-MCNC: 1.1 MG/DL (ref 0.2–1.3)
BUN SERPL-MCNC: 13 MG/DL (ref 7–30)
CALCIUM SERPL-MCNC: 8.9 MG/DL (ref 8.5–10.1)
CHLORIDE SERPL-SCNC: 98 MMOL/L (ref 94–109)
CO2 SERPL-SCNC: 24 MMOL/L (ref 20–32)
COPATH REPORT: NORMAL
CREAT SERPL-MCNC: 0.5 MG/DL (ref 0.52–1.04)
DIFFERENTIAL METHOD BLD: ABNORMAL
ERYTHROCYTE [DISTWIDTH] IN BLOOD BY AUTOMATED COUNT: 22.3 % (ref 10–15)
GFR SERPL CREATININE-BSD FRML MDRD: ABNORMAL ML/MIN/1.7M2
GLUCOSE BLDC GLUCOMTR-MCNC: 119 MG/DL (ref 70–99)
GLUCOSE BLDC GLUCOMTR-MCNC: 139 MG/DL (ref 70–99)
GLUCOSE BLDC GLUCOMTR-MCNC: 92 MG/DL (ref 70–99)
GLUCOSE BLDC GLUCOMTR-MCNC: 98 MG/DL (ref 70–99)
GLUCOSE SERPL-MCNC: 83 MG/DL (ref 70–99)
HCT VFR BLD AUTO: 25.1 % (ref 35–47)
HGB BLD-MCNC: 8.3 G/DL (ref 11.7–15.7)
LACTATE BLD-SCNC: 1.7 MMOL/L (ref 0.7–2.1)
MCH RBC QN AUTO: 28.6 PG (ref 26.5–33)
MCHC RBC AUTO-ENTMCNC: 33.1 G/DL (ref 31.5–36.5)
MCV RBC AUTO: 87 FL (ref 78–100)
PLATELET # BLD AUTO: 40 10E9/L (ref 150–450)
POTASSIUM SERPL-SCNC: 3.6 MMOL/L (ref 3.4–5.3)
PROT SERPL-MCNC: 6 G/DL (ref 6.8–8.8)
RBC # BLD AUTO: 2.9 10E12/L (ref 3.8–5.2)
SODIUM SERPL-SCNC: 131 MMOL/L (ref 133–144)
WBC # BLD AUTO: 0.1 10E9/L (ref 4–11)

## 2017-08-09 PROCEDURE — 85027 COMPLETE CBC AUTOMATED: CPT

## 2017-08-09 PROCEDURE — 25000132 ZZH RX MED GY IP 250 OP 250 PS 637: Performed by: INTERNAL MEDICINE

## 2017-08-09 PROCEDURE — 80053 COMPREHEN METABOLIC PANEL: CPT | Performed by: INTERNAL MEDICINE

## 2017-08-09 PROCEDURE — 93010 ELECTROCARDIOGRAM REPORT: CPT | Performed by: INTERNAL MEDICINE

## 2017-08-09 PROCEDURE — 20000002 ZZH R&B BMT INTERMEDIATE

## 2017-08-09 PROCEDURE — 83605 ASSAY OF LACTIC ACID: CPT | Performed by: INTERNAL MEDICINE

## 2017-08-09 PROCEDURE — 25000128 H RX IP 250 OP 636: Performed by: INTERNAL MEDICINE

## 2017-08-09 PROCEDURE — 25000125 ZZHC RX 250

## 2017-08-09 PROCEDURE — 93005 ELECTROCARDIOGRAM TRACING: CPT

## 2017-08-09 PROCEDURE — 25000125 ZZHC RX 250: Performed by: INTERNAL MEDICINE

## 2017-08-09 PROCEDURE — 99211 OFF/OP EST MAY X REQ PHY/QHP: CPT

## 2017-08-09 PROCEDURE — 97602 WOUND(S) CARE NON-SELECTIVE: CPT

## 2017-08-09 PROCEDURE — 00000146 ZZHCL STATISTIC GLUCOSE BY METER IP

## 2017-08-09 RX ORDER — DILTIAZEM HYDROCHLORIDE 5 MG/ML
INJECTION INTRAVENOUS
Status: DISPENSED
Start: 2017-08-09 | End: 2017-08-10

## 2017-08-09 RX ORDER — DILTIAZEM HYDROCHLORIDE 5 MG/ML
INJECTION INTRAVENOUS
Status: COMPLETED
Start: 2017-08-09 | End: 2017-08-09

## 2017-08-09 RX ORDER — ATENOLOL 25 MG/1
25 TABLET ORAL 2 TIMES DAILY
Status: DISCONTINUED | OUTPATIENT
Start: 2017-08-09 | End: 2017-08-12 | Stop reason: HOSPADM

## 2017-08-09 RX ORDER — DILTIAZEM HYDROCHLORIDE 5 MG/ML
20 INJECTION INTRAVENOUS ONCE
Status: COMPLETED | OUTPATIENT
Start: 2017-08-09 | End: 2017-08-09

## 2017-08-09 RX ADMIN — DIGOXIN 125 MCG: 125 TABLET ORAL at 08:15

## 2017-08-09 RX ADMIN — ALUMINUM HYDROXIDE, MAGNESIUM HYDROXIDE, SIMETHICONE 10 ML: 400; 400; 40 SUSPENSION ORAL at 08:14

## 2017-08-09 RX ADMIN — CALCIUM CARBONATE 600 MG (1,500 MG)-VITAMIN D3 400 UNIT TABLET 2 TABLET: at 08:15

## 2017-08-09 RX ADMIN — MEROPENEM 1 G: 1 INJECTION, POWDER, FOR SOLUTION INTRAVENOUS at 02:26

## 2017-08-09 RX ADMIN — FLUCONAZOLE 200 MG: 200 TABLET ORAL at 08:15

## 2017-08-09 RX ADMIN — GABAPENTIN 200 MG: 100 CAPSULE ORAL at 08:15

## 2017-08-09 RX ADMIN — MEROPENEM 1 G: 1 INJECTION, POWDER, FOR SOLUTION INTRAVENOUS at 19:45

## 2017-08-09 RX ADMIN — ASCORBIC ACID TAB 250 MG 500 MG: 250 TAB at 08:15

## 2017-08-09 RX ADMIN — PREDNISONE 5 MG: 5 TABLET ORAL at 08:15

## 2017-08-09 RX ADMIN — SODIUM CHLORIDE, PRESERVATIVE FREE 5 ML: 5 INJECTION INTRAVENOUS at 19:47

## 2017-08-09 RX ADMIN — ALUMINUM HYDROXIDE, MAGNESIUM HYDROXIDE, SIMETHICONE 10 ML: 400; 400; 40 SUSPENSION ORAL at 20:59

## 2017-08-09 RX ADMIN — GABAPENTIN 200 MG: 100 CAPSULE ORAL at 19:46

## 2017-08-09 RX ADMIN — FOLIC ACID 1 MG: 1 TABLET ORAL at 08:15

## 2017-08-09 RX ADMIN — ALUMINUM HYDROXIDE, MAGNESIUM HYDROXIDE, SIMETHICONE 10 ML: 400; 400; 40 SUSPENSION ORAL at 17:41

## 2017-08-09 RX ADMIN — ALLOPURINOL 300 MG: 300 TABLET ORAL at 08:15

## 2017-08-09 RX ADMIN — ATENOLOL 25 MG: 25 TABLET ORAL at 21:51

## 2017-08-09 RX ADMIN — DILTIAZEM HYDROCHLORIDE 20 MG: 5 INJECTION INTRAVENOUS at 20:02

## 2017-08-09 RX ADMIN — Medication 480 MCG: at 19:44

## 2017-08-09 RX ADMIN — ACYCLOVIR 400 MG: 400 TABLET ORAL at 19:46

## 2017-08-09 RX ADMIN — GABAPENTIN 200 MG: 100 CAPSULE ORAL at 14:59

## 2017-08-09 RX ADMIN — MULTIPLE VITAMINS W/ MINERALS TAB 1 TABLET: TAB at 08:15

## 2017-08-09 RX ADMIN — THIAMINE HCL (VITAMIN B1) 50 MG TABLET 50 MG: at 08:15

## 2017-08-09 RX ADMIN — ATENOLOL 25 MG: 25 TABLET ORAL at 08:15

## 2017-08-09 RX ADMIN — MEROPENEM 1 G: 1 INJECTION, POWDER, FOR SOLUTION INTRAVENOUS at 11:51

## 2017-08-09 RX ADMIN — ACYCLOVIR 400 MG: 400 TABLET ORAL at 08:15

## 2017-08-09 RX ADMIN — ALUMINUM HYDROXIDE, MAGNESIUM HYDROXIDE, SIMETHICONE 10 ML: 400; 400; 40 SUSPENSION ORAL at 11:51

## 2017-08-09 NOTE — PROGRESS NOTES
[facility], [place] -- Hematology / Oncology Progress Note  Date of Service (when I saw the patient): -- 08/09/2017     Assessment & Plan   Amelia Michel is a 57 y/o F PMH RA, cardiac arrest c/b extravasation LUE wound, A fib/Flutter (not on anticoagulation r/t TCP), and DLBCL (now s/p 1 cycle R-EPOCH D1 7/28/17) who transferred from ARU 2/2 to low grade fever, GPC bacteremia (strep pneumoniae), & parotitis (w/o abscess on CT).      Acute Issues  # Strep Pneumoniae bacteremia likely cause of neutropenic fever.  BCx + on 8/7/17 (positive in PICC & periphery)... NTD on 8/8/17 thus far.  Other aspects of neutropenic fever work up completed include (UCx--has long term brand, NTD.  CXR showing R slide pleural eff & associated opasities improved but b/l periphilar opacities new infection vs. pulm edema, however patient asymptomatic).   - Repeat blood culture 8/9.  - Continue merrem & await sensitivities (Vanc d/c with negative result on MecA & Van A/B gene).  - Consider pulling PICC line if re-spikes fever, or blood Cx turn positive, or at d/c r/t necessity (?).  - Mumps (result pending).    # Parotitis, identified on clinical exam & also on CT (no evidence of abscess).  PTA lasix 20 mg held r/t dehydration as possible cause.    - Monitor, continues on Merrem.      # Mucositis.  L lateral tongue 2/2 neutropenia--chemotherapy.  - Continue MMW.    # Diffuse Large B Cell Lymphoma.  Stage IV with BM & liver involvement.  S/P cycle 1 DA-R-EPOCH D1 7/28/17 tolerated well however with Rituxan had SOB/hypotension.  Was started on daily neupogen (r/t ARU) 8/2-.  At ARU was on PPX (levaquin, ACY, & Fluc).    - Liver biopsy showed DLBCL with non germinal center on FISH the atypical lymphoid population is positive for CD20, Bcl6, MUM-1 and c-myc, and negative for CD3, CD10,CD5 and cyclin-D1. Background T cells are CD3 and CD5 positive. EBV negative.    - Patient will switch to [facility] for next cycle as not  a DHL (per Jennifer/Susana).    # Pancytopenia 2/2 to chemotherapy & underlying DLBCL with BM involvement.  - Transfuse to keep HgB > 7.  - Transfuse to keep PLT > 10.  - CBC daily.    Chronic Issues  # Rheumatoid arthritis. History of seropositive rheumatoid arthritis (anti-CCP positive) since late 1980;s w/ multiple MTP osteotomies, partial right wrist fusion, longstanding therapy consisting of MTX, prednisone and HCQ   -Continue 5 mg PO prednisone.  - Follow-up with [facility] Rheumatology clinic Dr. Conor Cunha in 4-6 weeks after discharge    # LUE extravasation wound 2/2 OSH cardiac arrest.  Continue PTA wound cares (from ARU & re consult our WO team, no expanding erythema to explain cause of neutropenic fever.  Slowly improving per patient report.  Clean wound & skin around wound with microklenz.  Paint Cavilon no sting barrier around wound.  Apply medihoney nickel thick to areas with slough.  Cover with 2 mepilex border dressings.  Dressed every other day by wound care at ARU.  - WOC consult.    # OSH cardiac arrest. 5/29/17.  Etiology unclear possibly r/t AV prieto blocking agents.  Meets critieria for secondary prevention ICD but placement is on hold r/t LUE extravasation wound & tx. Of lymphoma.  EP discussed risk vs. Benefit of Life Vest patient defer.  Plan for f/u in cardiology with [facility] in 2-3 mo.    # A fib/flutter.  Rate controlled.  - Continue PTA dig/atenolol.    - No anticoagulation 2/2 to thrombocytopenia.    # Anasarca/volume overload.  Improving, trace edema b/l LE on exam.  CXR maybe with pulmonary edema.  No crackles on exam.  - Hold PTA 20 mg IV lasix (r/t parotitis).  - Gentle with IVF hydration.      # Hyperuricemia.    - Continue allopurinol.    # H/O saddle anethesia/urinary incontinence.  Urodynamic study outpatient.  - continue brand, urine Cx NTD.    # Steroid induced hyperglycemia.  Resolved (only on 5 of pred for RA).  - Not on insulin check BG with AM labs.    # Aortic masses on BRE.   Not endocarditis.  Lymphoma (?).  Continue chemotherapy.    # LFT derangement 2/2 to lymphoma involvement of liver.    - Trend LFTs.    MISC  FEN IVF d/c'ed with HD stability & good po intake Na improving will monitor tomm.  Electrolyte s/s (diuretic on hold, good PO intake reported).  RDAT.  PPX not on GI ppx, not on DVT/VTE r/t TCP  DISPO awaiting susceptibilities, likely d/c to TCU on 8/11.    Full Code    Betty Jon  [facility]  [phone]  Hematology/Oncology  August 9, 2017    Interval History   Amelia reports feeling well this AM.  Tmax 99.8 ON.  Stable BP.  Denies chills, HA, dizziness, dysphagia, cough, SOB, CP, NVDC, dysuria, numbness/tingling.  Does have cheek/neck swelling & TTP.  She does have mouth sore that bothers her on L side of tongue but MMW helpful.  Up eating breakfast.  Says at TCU doing well, walking with walker, able to transfer in bathroom.  Continues to have brand catheter in place (UCx NTD).    Physical Exam   Vitals:    08/08/17 1709 08/09/17 0801   Weight: 55.8 kg (123 lb 1.6 oz) 56.2 kg (123 lb 14.4 oz)     Vital Signs with Ranges  Temp:  [95.6  F (35.3  C)-99.8  F (37.7  C)] 99  F (37.2  C)  Pulse:  [67-85] 80  Resp:  [16-18] 16  BP: (140-171)/(54-82) 147/75  SpO2:  [96 %-98 %] 97 %  I/O last 3 completed shifts:  In: 1425 [P.O.:350; I.V.:1075]  Out: 1500 [Urine:1500]    Constitutional: Awake, alert, cooperative, in NAD, thin/frail.  Eyes: PERRL, EOMI, sclera clear, conjunctiva normal.  ENT: Normocephalic, without obvious abnormality, moist mucus membranes, MMW residue present, mouth sore L tongue.  + for bilateral lower cheek/neck swelling (TTP).    Respiratory: Non-labored breathing, good air exchange, CTAB, no crackles or wheezing.  Cardiovascular: RRR, no murmur noted.  GI: +BS, soft, non-distended, non-tender, no masses palpated, no hepatosplenomegaly.  Skin: No concerning lesions or rash other than mepilex on L arm (hx. Extravasation).  Musculoskeletal: 1+ edema b/l LE  with wraps in place, LUE wound with mepilex covering.    Neurologic: Awake, A&O x 3. Cranial nerves II-XII are grossly intact.   Neuropsychiatric: Calm, normal affect.    MEDS  Medications     - MEDICATION INSTRUCTIONS -       IV infusion builder WITH LARGE additive list Stopped (08/08/17 2203)       acyclovir  400 mg Oral BID     atenolol  25 mg Oral Daily     calcium-vitamin D  2 tablet Oral QAM     digoxin  125 mcg Oral Daily     filgrastim 15 mcg/mL (in Dextrose)  480 mcg Intravenous Daily     fluconazole  200 mg Oral Daily     folic acid  1 mg Oral Daily     gabapentin  200 mg Oral TID     multivitamin, therapeutic with minerals  1 tablet Oral Daily     predniSONE  5 mg Oral Daily     thiamine  50 mg Oral Daily     heparin lock flush  5-10 mL Intracatheter Q24H     allopurinol  300 mg Oral Daily     ascorbic acid  500 mg Oral Daily     meropenem  1 g Intravenous Q8H     lidocaine visc 2% & maalox/mylanta w/simethicone & diphenhydramine  10 mL Swish & Spit 4x Daily AC & HS     insulin aspart  1-7 Units Subcutaneous TID AC     insulin aspart  1-5 Units Subcutaneous At Bedtime     LABS  Recent Labs   Lab Test  08/09/17   0339  08/08/17   0548  08/07/17   0541  08/06/17   0547  08/05/17   1012   08/04/17   0204  08/03/17   0309  08/02/17   0402  08/01/17   0207  07/31/17   0331   WBC  0.1*  0.1*  0.2*  0.3*  0.4*   < >  1.6*  3.0*  2.2*  2.4*  2.3*   NEUTROPHIL   --    --    --    --    --    --   86.8  93.9  77.8  82.8  77.9   HGB  8.3*  7.7*  8.0*  8.2*  8.5*   < >  8.1*  8.6*  8.9*  7.9*  8.3*   PLT  40*  3*  15*  11*  10*   < >  15*  19*  19*  18*  20*    < > = values in this interval not displayed.     Recent Labs   Lab Test  08/09/17   0339  08/08/17   1846  08/07/17   0541  08/06/17   0547  08/05/17   0659  08/04/17   1605   NA  131*  130*  137   --   134  131*   POTASSIUM  3.6  4.2  4.2  4.0  4.0  4.6   CHLORIDE  98  98  95   --   90*  90*   CO2  24  26  33*   --   34*  33*   ANIONGAP  8  6  9   --   10  8    BUN  13  18  23   --   33*  34*   CR  0.50*  0.48*  0.49*  0.51*  0.50*  0.53   VIKTORIYA  8.9  9.3  9.0   --   8.8  8.4*     Recent Labs   Lab Test  08/07/17   0541  08/06/17   0825  08/05/17   0659  08/04/17   0204   08/03/17   0309   08/02/17   0402   MAG  2.2  2.0  2.0  2.2   < >  2.4*   < >  2.0   PHOS  3.5   --    --   3.1   --   2.3*   --   2.9   LDH   --    --    --   314*   --   293*   --   302*   URIC  2.3*   --    --   3.5   --    --    --   3.3    < > = values in this interval not displayed.     Recent Labs   Lab Test  08/09/17   0339  08/07/17   0541  08/05/17   0659  08/04/17   0204  08/03/17   0309  08/02/17   0402   BILITOTAL  1.1  1.2  1.1  1.2  1.2  1.3   ALKPHOS  149  189*  226*  232*  234*  261*   ALT  72*  115*  119*  108*  91*  86*   AST  19  63*  69*  75*  69*  82*   ALBUMIN  2.9*  3.3*  3.3*  3.1*  2.9*  2.8*   LDH   --    --    --   314*  293*  302*     CULTURES    Recent Labs   Lab Test  08/08/17   1832  08/08/17   0945  08/07/17   2218  08/07/17   2210  07/28/17   0240   CULT  No growth after 9 hours  No growth after 9 hours  Culture in progress  Cultured on the 1st day of incubation: Gram positive cocci in chains  Critical Value/Significant Value, preliminary result only, called to and read   back by ABBEY Olvera at 1405 on 8/8/17 by JEFFERY.  (Note)  POSITIVE for STREPTOCOCCUS PNEUMONIAE by Verigene multiplex nucleic  acid test. Note: Streptococcus mitis group is known to cross react  with S. pneumoniae. Correlation with culture results and clinical  presentation is necessary. Final identification and antimicrobial  susceptibility testing will be verified by standard methods.    Specimen tested with Verigene multiplex, gram-positive blood culture  nucleic acid test for the following targets: Staph aureus, Staph  epidermidis, Staph lugdunensis, other Staph species, Enterococcus  faecalis, Enterococcus faecium, Streptococcus species, S. agalactiae,  S. anginosus grp., S. pneumoniae, S.  pyogenes, Listeria sp., mecA  (methicillin resistance) and Se/B (vancomycin resistance).    Critical Value/Significant Value called to an d read back by ZAID REINOSO RN @1627 8/8/17. CT  *  Cultured on the 1st day of incubation: Gram positive cocci in chains  Critical Value/Significant Value, preliminary result only, called to and read   back by Marielena Kovacs RN URARU at 1405 on 8/8/17 by JEFFERY.  *  No growth   SDES  Blood PURPLEP  Blood Red port  Catheterized Urine  Blood PURPLE PORT  Blood Right Hand  Other Transfusion Reaction Donor Unit  Other Transfusion Reaction Donor Unit       IMAGING  Recent Results (from the past 24 hour(s))   XR Chest 2 Views    Narrative    Examination: XR CHEST 2 VW, 8/8/2017 7:40 PM    Comparison: 7/27/2017    History: fever    Findings: Right upper extremity PICC tip at the level of the mid SVC.  Stable enlargement of cardiac silhouette. Right-sided pleural effusion  and associated right basilar opacities are improved from 7/27/2017.  Bilateral perihilar opacities appear slightly increased. No  pneumothorax.      Impression    Impression: While the right-sided pleural effusion and associated  right basilar opacities have improved, there appear to be increased  bilateral perihilar opacities. This could represent a new infectious  process or mild pulmonary edema.    I have personally reviewed the examination and initial interpretation  and I agree with the findings.    ALINA HOWARD MD   CT Soft Tissue Neck w Contrast    Narrative    CT SOFT TISSUE NECK W CONTRAST 8/8/2017 9:19 PM    History:  bilateral neck swelling, ? parotitis, r/o abscess      Comparison:  PET/CT 7/18/2017     Technique: Following intravenous administration of nonionic iodinated  contrast medium, thin section helical CT images were obtained from the  skull base down to the level of the aortic arch.  Axial, coronal and  sagittal reformations were performed with 2-3 mm slice thickness  reconstruction. Images were  reviewed in soft tissue, lung and bone  windows.    Contrast: isovue 370    Findings:   Symmetric hyperenhancement of both parotid glands, new from 7/18/2017.  No evidence of intraparotid abscess. No sialolithiasis. There is edema  in the overlying subcutaneous fat, especially on the right (series 4  image 105). The submandibular glands are normal in appearance.    Evaluation of the mucosal space demonstrates no evident abnormality in  the nasopharynx, oropharynx, hypopharynx or the glottis. The tongue  base appears normal. The thyroid gland appears normal.    1.4 cm left level 2 lymph node (series 3 image 33), previously  measured 1.2 cm on 7/18/2017, and demonstrated hypermetabolism at that  time. 1.8 x 1.8 cm paratracheal node (series 3 image 58) is not  significantly changed. Additional enlarged lymph nodes along the left  side of the aorta are not significantly changed in size but enhance  less than on 7/18/2017.The fascial spaces in the neck are intact  bilaterally. The major vascular structures in the neck appear  unremarkable.    Evaluation of the osseous structures demonstrate no worrisome lytic or  sclerotic lesion. Old right-sided rib fractures. Moderate-severe  degenerative changes in the cervical spine. The visualized paranasal  sinuses are clear. The mastoid air cells are clear.     Moderate right-sided pleural effusion, improved from 7/18/2017.  Left-sided pleural effusion is not seen although may still be present  more inferiorly.      Impression    Impression:  1. Bilateral parotitis. No abscess.  2. Left level 2A lymph node, which was previously hypermetabolic is  slightly increased in size from 7/18/2017. This could represent  superimposed reactive change given findings of parotitis. Additional  enlarged upper mediastinal lymph nodes are not significantly changed  in size.  3. Improved pleural effusions.    I have personally reviewed the examination and initial interpretation  and I agree with the  findings.    RAMONA CAMERON MD

## 2017-08-09 NOTE — PROGRESS NOTES
[facility] Nurse Inpatient Wound Assessment    Initial Assessment  Reason for consultation: Evaluate and treat Left arm extravasation wound    ASSESSMENT:   Left arm wound due to extravasation  Status: initial assessment    TREATMENT  PLAN  Left upper arm wounds: Clean wound and skin around wound with microklenz.  Paint Cavilon no sting barrier to skin around wound.  Apply medihoney nickel thick to areas with slough.  Cover with two mepilex border dressings.      Orders Written  WO Nurse follow-up plan: Weekly  Nursing to notify the Provider(s) and re-consult the WO Nurse if wound(s) deteriorates or new skin concern.    Patient History  According to provider note(s):  Amelia Michel is a 56 year old female with complex past medical history of prior VSD repair, rheumatoid arthritis on long-term methotrexate, recent dx atrial fibrillation/flutter/SVT who was recently admitted to [facility] on  after an out of hospital cardiac arrest thought to be related to AV prieto blocking agents (angio normal). She made a full recovery and was discharged to rehab on 6/15 with a lifevest. Readmitted from rehab 17 for FUO & afib w rvr. With workup of fever and progressive cytopenias, diagnosis of DLBCL was made. Transferred to the Hematology service on . s/p Cycle 1 R-EPOCH.   Readmitted to [place], patient seen on other campus on 17  Objective Data   Containment of urine/stool: Continent of bowel and Continent of bladder  Active Diet Order    Active Diet Order      Regular Diet Adult  Output:  I/O last 3 completed shifts:  In: 1425 [P.O.:350; I.V.:1075]  Out: 1500 [Urine:1500]    Risk Assessment:   Jay Jay Score  Av.9  Min: 13  Max: 19                           Labs:     Recent Labs  Lab 17  0339  17  0541  17  0204   HGB 8.3*  < > 8.0*  < > 8.1*   INR  --   --  1.21*  --  1.42*   WBC 0.1*  < > 0.2*  < > 1.6*   A1C  --   --   --   --  6.3*   < > = values in this interval not  displayed.    Recent Labs  Lab 08/08/17  1832 08/08/17  0945 08/07/17  2218 08/07/17  2210   CULT No growth after 16 hours  No growth after 16 hours Culture in progress Cultured on the 1st day of incubation: Streptococcus mitis group Speciation in progress Referred to research section for identificationCritical Value/Significant Value, preliminary result only, called to and read back by Marielena Kovacs RN URARU at 1405 on 8/8/17 by JEFFERY.Susceptibility testing in progress(Note)POSITIVE for STREPTOCOCCUS PNEUMONIAE by 139shop multiplex nucleicacid test. Note: Streptococcus mitis group is known to cross reactwith S. pneumoniae. Correlation with culture results and clinicalpresentation is necessary. Final identification and antimicrobialsusceptibility testing will be verified by standard methods.Specimen tested with Verigene multiplex, gram-positive blood culturenucleic acid test for the following targets: Staph aureus, Staphepidermidis, Staph lugdunensis, other Staph species, Enterococcusfaecalis, Enterococcus faecium, Streptococcus species, S. agalactiae,S. anginosus grp., S. pneumoniae, S. pyogenes, Listeria sp., mecA(met hicillin resistance) and Se/B (vancomycin resistance).Critical Value/Significant Value called to and read back by RADHA POTTER @1621 8/8/17. CT* Cultured on the 1st day of incubation: Streptococcus mitis group Speciation in progressCritical Value/Significant Value, preliminary result only, called to and read back by Marielena Kovacs RN URARU at 1405 on 8/8/17 by JEFFERY.Susceptibility testing done on previous specimen*       PHYSICAL EXAM:  Skin assessment: left upper arm    Wound Location:  Left  arm  Wound History: IV extravasation wound with large amount of epidermal loss.     8/2/17 ( 8/7/17    Measurements (length x width x depth, in cm)   Proximal to distal: 0.1 x 0.1 x 0.2 cm, 2 x 0.9 x 0.6 cm, 2 x 0.7 x 0.5   Minimal undermining remains on middle wound from 7-3 o'clock measure 0.2 cm  Wound Base:  both with tan slough bases, proximal with visible suture  Palpation of the wound bed: boggy   Periwound skin: intact, closed skin edges  Color: pink  Temperature: normal   Drainage: small  Description of drainage: yellow stained with Medihoney  Odor: none  Pain: minimal with wound cleansing    INTERVENTIONS:  Current support surface: Standard  Atmos Air  Current off-loading measures: Pillows under calves  Visual inspection of wounds(s) completed.  Wound Care: was done per plan of care.  Supplies: medihoney in room  Education provided today: wound care    Discussed plan of care with Patient  Face to face time: 20 minutes

## 2017-08-09 NOTE — PLAN OF CARE
Problem: Individualization  Goal: Patient Preferences  Outcome: No Change  Bree offered no complaints and needs no replacements this morning.

## 2017-08-09 NOTE — PLAN OF CARE
Problem: Goal/Outcome  Goal: Goal Outcome Summary  FOCUS/GOAL  Medical management     ASSESSMENT, INTERVENTIONS AND CONTINUING PLAN FOR GOAL:  Pt discharged from ARU today @ 1640, transferred to [place] unit 5C. VSS with BP slightly elevated, alert and oriented, denies c/o pain or discomfort. Report given to receiving facility,  at pt's bedside when transferred. PICC line intact, brand patent and draining. All personal belongings sent with patient, transferred via [facility] by stretcher.

## 2017-08-09 NOTE — PLAN OF CARE
Problem: Individualization  Goal: Patient Preferences  Outcome: No Change  Temp max 99.0. bs 92,139. Using mmw before meals. No pain. No n/v. Ate cheerios for breakfast and skim milk, one slice of bread, roast beef sandwich with swiss cheese, peaches and benefiber for lunch. 20 grams of carbs for breakfast and approximately 72 grams of carbs using calorie mirtha for lunch. Wound care nurse changed Amelia's dressing today. Dressing to be changed daily. Up to the chair with one assist using the walker. In droplet to r/o the mumps.

## 2017-08-09 NOTE — PROGRESS NOTES
SPIRITUAL HEALTH SERVICES  SPIRITUAL ASSESSMENT Progress Note  [facility] ([place] 5C     REFERRAL SOURCE: Hospital  request, follow up    Attempted to visit Amelia, but she was sleeping. Asked nurse to pass message to her that I had come by and will return tomorrow.    PLAN: Will follow up 8/10, re-entered hospital  request.    Caitie Colón   Intern  Pager [phone]

## 2017-08-10 ENCOUNTER — APPOINTMENT (OUTPATIENT)
Dept: PHYSICAL THERAPY | Facility: CLINIC | Age: 57
DRG: 871 | End: 2017-08-10
Attending: NURSE PRACTITIONER
Payer: COMMERCIAL

## 2017-08-10 LAB
ALBUMIN SERPL-MCNC: 2.8 G/DL (ref 3.4–5)
ALP SERPL-CCNC: 148 U/L (ref 40–150)
ALT SERPL W P-5'-P-CCNC: 61 U/L (ref 0–50)
ANION GAP SERPL CALCULATED.3IONS-SCNC: 8 MMOL/L (ref 3–14)
AST SERPL W P-5'-P-CCNC: 17 U/L (ref 0–45)
BACTERIA SPEC CULT: ABNORMAL
BACTERIA SPEC CULT: ABNORMAL
BILIRUB SERPL-MCNC: 0.9 MG/DL (ref 0.2–1.3)
BUN SERPL-MCNC: 18 MG/DL (ref 7–30)
CALCIUM SERPL-MCNC: 9.2 MG/DL (ref 8.5–10.1)
CHLORIDE SERPL-SCNC: 105 MMOL/L (ref 94–109)
CO2 SERPL-SCNC: 24 MMOL/L (ref 20–32)
CREAT SERPL-MCNC: 0.41 MG/DL (ref 0.52–1.04)
DIFFERENTIAL METHOD BLD: ABNORMAL
ERYTHROCYTE [DISTWIDTH] IN BLOOD BY AUTOMATED COUNT: 22.3 % (ref 10–15)
GFR SERPL CREATININE-BSD FRML MDRD: ABNORMAL ML/MIN/1.7M2
GLUCOSE BLDC GLUCOMTR-MCNC: 119 MG/DL (ref 70–99)
GLUCOSE SERPL-MCNC: 86 MG/DL (ref 70–99)
HCT VFR BLD AUTO: 26.4 % (ref 35–47)
HGB BLD-MCNC: 8.9 G/DL (ref 11.7–15.7)
INTERPRETATION ECG - MUSE: NORMAL
Lab: ABNORMAL
MAGNESIUM SERPL-MCNC: 2 MG/DL (ref 1.6–2.3)
MCH RBC QN AUTO: 29.8 PG (ref 26.5–33)
MCHC RBC AUTO-ENTMCNC: 33.7 G/DL (ref 31.5–36.5)
MCV RBC AUTO: 88 FL (ref 78–100)
MICRO REPORT STATUS: ABNORMAL
MICRO REPORT STATUS: ABNORMAL
MICROORGANISM SPEC CULT: ABNORMAL
PHOSPHATE SERPL-MCNC: 2.1 MG/DL (ref 2.5–4.5)
PLATELET # BLD AUTO: 24 10E9/L (ref 150–450)
POTASSIUM SERPL-SCNC: 3.8 MMOL/L (ref 3.4–5.3)
PROT SERPL-MCNC: 6.1 G/DL (ref 6.8–8.8)
RBC # BLD AUTO: 2.99 10E12/L (ref 3.8–5.2)
SODIUM SERPL-SCNC: 136 MMOL/L (ref 133–144)
SPECIMEN SOURCE: ABNORMAL
SPECIMEN SOURCE: ABNORMAL
WBC # BLD AUTO: 0.2 10E9/L (ref 4–11)

## 2017-08-10 PROCEDURE — 25000132 ZZH RX MED GY IP 250 OP 250 PS 637: Performed by: INTERNAL MEDICINE

## 2017-08-10 PROCEDURE — 97530 THERAPEUTIC ACTIVITIES: CPT | Mod: GP

## 2017-08-10 PROCEDURE — 80053 COMPREHEN METABOLIC PANEL: CPT | Performed by: INTERNAL MEDICINE

## 2017-08-10 PROCEDURE — 25000128 H RX IP 250 OP 636: Performed by: INTERNAL MEDICINE

## 2017-08-10 PROCEDURE — 83735 ASSAY OF MAGNESIUM: CPT | Performed by: INTERNAL MEDICINE

## 2017-08-10 PROCEDURE — 25000125 ZZHC RX 250: Performed by: INTERNAL MEDICINE

## 2017-08-10 PROCEDURE — 85027 COMPLETE CBC AUTOMATED: CPT

## 2017-08-10 PROCEDURE — 25000125 ZZHC RX 250: Performed by: NURSE PRACTITIONER

## 2017-08-10 PROCEDURE — 00000146 ZZHCL STATISTIC GLUCOSE BY METER IP

## 2017-08-10 PROCEDURE — 97116 GAIT TRAINING THERAPY: CPT | Mod: GP

## 2017-08-10 PROCEDURE — 20000002 ZZH R&B BMT INTERMEDIATE

## 2017-08-10 PROCEDURE — 25000128 H RX IP 250 OP 636: Performed by: NURSE PRACTITIONER

## 2017-08-10 PROCEDURE — 97161 PT EVAL LOW COMPLEX 20 MIN: CPT | Mod: GP

## 2017-08-10 PROCEDURE — 40000193 ZZH STATISTIC PT WARD VISIT

## 2017-08-10 PROCEDURE — 84100 ASSAY OF PHOSPHORUS: CPT | Performed by: INTERNAL MEDICINE

## 2017-08-10 PROCEDURE — 87040 BLOOD CULTURE FOR BACTERIA: CPT | Performed by: NURSE PRACTITIONER

## 2017-08-10 PROCEDURE — 25000132 ZZH RX MED GY IP 250 OP 250 PS 637: Performed by: NURSE PRACTITIONER

## 2017-08-10 RX ORDER — POTASSIUM CL/LIDO/0.9 % NACL 10MEQ/0.1L
10 INTRAVENOUS SOLUTION, PIGGYBACK (ML) INTRAVENOUS
Status: DISCONTINUED | OUTPATIENT
Start: 2017-08-10 | End: 2017-08-12 | Stop reason: HOSPADM

## 2017-08-10 RX ORDER — MAGNESIUM SULFATE HEPTAHYDRATE 40 MG/ML
4 INJECTION, SOLUTION INTRAVENOUS EVERY 4 HOURS PRN
Status: DISCONTINUED | OUTPATIENT
Start: 2017-08-10 | End: 2017-08-12 | Stop reason: HOSPADM

## 2017-08-10 RX ORDER — POTASSIUM CHLORIDE 1.5 G/1.58G
20-40 POWDER, FOR SOLUTION ORAL
Status: DISCONTINUED | OUTPATIENT
Start: 2017-08-10 | End: 2017-08-12 | Stop reason: HOSPADM

## 2017-08-10 RX ORDER — POTASSIUM CHLORIDE 29.8 MG/ML
20 INJECTION INTRAVENOUS
Status: DISCONTINUED | OUTPATIENT
Start: 2017-08-10 | End: 2017-08-12 | Stop reason: HOSPADM

## 2017-08-10 RX ORDER — POTASSIUM CHLORIDE 7.45 MG/ML
10 INJECTION INTRAVENOUS
Status: DISCONTINUED | OUTPATIENT
Start: 2017-08-10 | End: 2017-08-12 | Stop reason: HOSPADM

## 2017-08-10 RX ORDER — ERTAPENEM 1 G/1
1 INJECTION, POWDER, LYOPHILIZED, FOR SOLUTION INTRAMUSCULAR; INTRAVENOUS EVERY 24 HOURS
Status: DISCONTINUED | OUTPATIENT
Start: 2017-08-10 | End: 2017-08-12 | Stop reason: HOSPADM

## 2017-08-10 RX ORDER — POTASSIUM CHLORIDE 750 MG/1
20-40 TABLET, EXTENDED RELEASE ORAL
Status: DISCONTINUED | OUTPATIENT
Start: 2017-08-10 | End: 2017-08-12 | Stop reason: HOSPADM

## 2017-08-10 RX ADMIN — ALUMINUM HYDROXIDE, MAGNESIUM HYDROXIDE, SIMETHICONE 10 ML: 400; 400; 40 SUSPENSION ORAL at 12:03

## 2017-08-10 RX ADMIN — SODIUM PHOSPHATE, MONOBASIC, MONOHYDRATE AND SODIUM PHOSPHATE, DIBASIC, ANHYDROUS 15 MMOL: 276; 142 INJECTION, SOLUTION INTRAVENOUS at 12:04

## 2017-08-10 RX ADMIN — ALUMINUM HYDROXIDE, MAGNESIUM HYDROXIDE, SIMETHICONE 10 ML: 400; 400; 40 SUSPENSION ORAL at 07:58

## 2017-08-10 RX ADMIN — ERTAPENEM SODIUM 1 G: 1 INJECTION, POWDER, LYOPHILIZED, FOR SOLUTION INTRAMUSCULAR; INTRAVENOUS at 14:38

## 2017-08-10 RX ADMIN — SODIUM CHLORIDE, PRESERVATIVE FREE 5 ML: 5 INJECTION INTRAVENOUS at 17:18

## 2017-08-10 RX ADMIN — Medication 480 MCG: at 16:46

## 2017-08-10 RX ADMIN — MEROPENEM 1 G: 1 INJECTION, POWDER, FOR SOLUTION INTRAVENOUS at 10:28

## 2017-08-10 RX ADMIN — ATENOLOL 25 MG: 25 TABLET ORAL at 07:57

## 2017-08-10 RX ADMIN — GABAPENTIN 200 MG: 100 CAPSULE ORAL at 14:38

## 2017-08-10 RX ADMIN — POTASSIUM CHLORIDE 20 MEQ: 750 TABLET, EXTENDED RELEASE ORAL at 07:57

## 2017-08-10 RX ADMIN — THIAMINE HCL (VITAMIN B1) 50 MG TABLET 50 MG: at 07:56

## 2017-08-10 RX ADMIN — ALLOPURINOL 300 MG: 300 TABLET ORAL at 07:57

## 2017-08-10 RX ADMIN — MEROPENEM 1 G: 1 INJECTION, POWDER, FOR SOLUTION INTRAVENOUS at 03:05

## 2017-08-10 RX ADMIN — ACYCLOVIR 400 MG: 400 TABLET ORAL at 20:33

## 2017-08-10 RX ADMIN — GABAPENTIN 200 MG: 100 CAPSULE ORAL at 20:33

## 2017-08-10 RX ADMIN — FLUCONAZOLE 200 MG: 200 TABLET ORAL at 07:57

## 2017-08-10 RX ADMIN — PREDNISONE 5 MG: 5 TABLET ORAL at 07:57

## 2017-08-10 RX ADMIN — ACYCLOVIR 400 MG: 400 TABLET ORAL at 07:57

## 2017-08-10 RX ADMIN — CALCIUM CARBONATE 600 MG (1,500 MG)-VITAMIN D3 400 UNIT TABLET 2 TABLET: at 07:57

## 2017-08-10 RX ADMIN — Medication 2 G: at 08:44

## 2017-08-10 RX ADMIN — ATENOLOL 25 MG: 25 TABLET ORAL at 20:33

## 2017-08-10 RX ADMIN — ALUMINUM HYDROXIDE, MAGNESIUM HYDROXIDE, SIMETHICONE 10 ML: 400; 400; 40 SUSPENSION ORAL at 16:44

## 2017-08-10 RX ADMIN — DIGOXIN 125 MCG: 125 TABLET ORAL at 07:57

## 2017-08-10 RX ADMIN — GABAPENTIN 200 MG: 100 CAPSULE ORAL at 08:09

## 2017-08-10 RX ADMIN — FOLIC ACID 1 MG: 1 TABLET ORAL at 07:57

## 2017-08-10 RX ADMIN — MULTIPLE VITAMINS W/ MINERALS TAB 1 TABLET: TAB at 07:57

## 2017-08-10 RX ADMIN — ASCORBIC ACID TAB 250 MG 500 MG: 250 TAB at 07:57

## 2017-08-10 NOTE — PLAN OF CARE
Problem: Goal Outcome Summary  Goal: Goal Outcome Summary  PT 5C: Pt tolerated session very well. Amb bouts of 15', 75' and then 175' progressing from walker to IV pole. [place] she could've done more but we held back due to lowering of platelets overnight. With progression, feel she will be strong enough to d/c home with assist and home PT. May be more TCU appropriate if cannot tolerate stairs or have ample amount of assist. Will continue to follow.

## 2017-08-10 NOTE — PLAN OF CARE
Problem: Goal Outcome Summary  Goal: Goal Outcome Summary  Outcome: No Change  Afebrile, VSS. Has been in controlled a-fib all night (rate 70s-90s). She has no complaints of pain. Swelling to neck/face is unchanged. Chronic brand in place with good urine output. She said that they usually want to keep her K+ above 4.0 d/t her cardiac history. She is currently on the low replacement protocol, moonlighter page but no response received. Up with SBA. Continue plan of care.

## 2017-08-10 NOTE — PROGRESS NOTES
"SPIRITUAL HEALTH SERVICES  SPIRITUAL ASSESSMENT Progress Note  [facility] ([place]) 5C     PRIMARY FOCUS:     Support for coping    REFERRAL SOURCE: Hospital  request, follow up    ILLNESS CIRCUMSTANCES:   Reviewed documentation. Reflective conversation shared with Amelia which integrated elements of illness and family narratives.     Context of Serious Illness/Symptom(s) - Amelia shared that she's back from rehab due to a blood infection, but treatment is going well and she expects to be back to rehab tomorrow. She expressed feeling like she's getting her strength back, and that rehab is going well.    Resources for Support -  and sister    DISTRESS:     Emotional/Spiritual/Existential Distress - Patient expressed that being a patient has been an adjustment, because she's had to let go of things like independence. 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 You don't necessarily understand that when you're just a nurse and haven't been a patient.\"    Presybeterian Distress - None expressed    Social/Cultural/Economic Distress - Patient expressed the frustration of needing help bathing and going to the bathroom; expressed that her ability to do things herself is improving, and she is glad about that.    SPIRITUAL/Jainism COPING:     Buddhist/Jennifer - We talked about the role of chaplains in medical treatment and she described having a  as \"helpful\" because \"it's part of the healing process.\"    Spiritual Practice(s) - We shared a prayer for no more \"bumps in the road.\"    Emotional/Relational/Existential Connections - Patient expressed gladness that her  is coping well with her readmittance to the hospital.    GOALS OF CARE:    Goals of Care - Patient expressed the desire to get back to rehab and regain her strength, so she can get home to experience " summer and hopefully celebrate her 's birthday in a few weeks.    Meaning/Sense-Making - We joked about this hospitalization being like the worst kind of summer camp, but one that is hopefully ending soon. Amelia reflected on how this hospitalization has changed her sense of independence and has been a role flip - going from giving care to receiving care, and that is giving her more perspective.    PLAN: Will follow up with patient next week; we discussed my internship ending on 8/18 and that last week will be our last time seeing each other.    Caitie Colón   Intern  Pager [phone]

## 2017-08-10 NOTE — PLAN OF CARE
Problem: Goal Outcome Summary  Goal: Goal Outcome Summary  Outcome: Declining  Afebrile. Denies pain or nausea. Pt HR found to be 151 with routine vitals check at 1945, EKG done and telemetry applied, showing atrial flutter. Pt asymptomatic. MD ordered one time dose of cardizem 20mg IV, given at 2002 with good relief initially. HR down to 74 NSR for about 30 minutes then when to atrial fibrilation 80s-90s. HR up to 149 again in a.fib but not sustaining. MD notified, atenolol frequency increased to BID and a dose given this evening at 2150.  If HR goes up again moonlighter may consider contacting cardiology for their input and/or giving repeat dose of cardizem.     Lactic acid triggered, 1.7. Dressing to L arm CDI, done by WOCN on day shift. Chronic brand in place, draining adequate amounts of urine. Facial and neck swelling continues 2+.  and 98. Good appetite, ate 100% of dinner and drank 2 beneproteins this shift.

## 2017-08-10 NOTE — PROGRESS NOTES
[facility], [place]   Antimicrobial Management Team (AMT) Note            To: Heme malignancy team  Unit: 5CBM  Allergies:       Brief Summary:   Amelia Michel is a 56 year old female with a recent dx of Stage 4 Diffuse Large B-Cell Lymphoma with bone & liver mets (6/2017). She was admitted on 8/8 with chief complaint of low grade fever and neck swelling. She also has a PMH significant for s/p OSH cardiac arrest d/t AV node block (5/29/17), VSD repair (1969), Rheumatoid arthritis (uses MTX), uncontrolled hyperglycemia, recent dx of Afib/Aflutter/SVT, UTI with EBSL E. coli (treated with ceftriaxone, 5/21/17), and anasarca.  HPI:  Patient has been i/o of hospital numerous times this year for cardiac events as well as fevers and progressive cytopenias. She is currently s/p chemo Cycle 1 of dose adjusted R-EPOCH (Day 1=7/28/17); she is also receiving Neupogen.  On 8/8 pt was afebrile with axillary temp of 98.8 F after having a low grade temperature of 100 F. Meropenem was started empirically 2/2 suspicion of fevers related to lymphoma. Bilateral facial edema specific to parotid glands was noted as present. She also has an extravasation wound in LUE w/o evidence of spreading infection. However, significant leukopenia/neutropenia has worsened since admission. Lab returned critical results of positive culture, from both purple port and right hand peripheral growing Gram positive cocci in chains. Cultures reported with a result of Streptococcus mitis group (sensitivities below). Yesterday, pt s temp had decreased to 99 F with a continuation of facial swelling (2+) and neutropenia. Mumps virus PCR was ordered and pending.    Assessment:  Neutropenic Fever with Parotitis 2/2 Streptococcus  mitis group Bacteremia vs 2/2 Mumps virus   Clinical findings are c/w neutropenic 2/2 parotitis and Streptococcal bacteremia including fever,  swollen glands, and leukopenia; however, mumps presents similarly.  Given patient s history of fevers and cytopenias, it would be reasonable to de-escalate antimicrobial therapy further from ertapenem to ceftriaxone. Patient previously tolerated ceftriaxone (5/2017) and this is the preferred product given culture and sensitivity data and ineligibility to receive PCN skin testing d/t severe neutropenia. Furthermore, Streptococcus mitis group is a known  of biofilms, such as on central lines. Patient had a PICC placed on 7/27/17 and while not the known primary source of infection, this cannot be ruled out and if was not the primary source can be a continued source for persistent bacteremia.  A temporary PICC line removal until repeat blood cultures are negative at 3 days and/or Cycle 2 of chemotherapy may be beneficial.     Recommendation/Interventions:   1). Discontinue ertapenem and initiate ceftriaxone 1 g IV q24h.  2). Referral for ID consult for further evaluation of PICC for risk-benefit of source of infection.      Discussed w/ID Staff:  Monalisa Box,, PharmD, MD Chacha Browne, PharmD Student    Current Antibiotic therapy:  Ertapenem 1 g IV q24h (8/10-    Previous Antibiotic therapy:  Vancomycin 1000 mg IV once (8/8)  Meropenem 1 g IV q8h (8/8-10)    Culture Results:  Date Culture Site Organism   8/8 BC - right hand Streptococcus mitis group   8/7 BC - purple port Strep. Pneumoniae or mitis group, by Verigene   8/8 Urine culture - catheterized urine Pending   8/8  BC - purple port Pending, no growth after 2d   8/8 BC - red port  Pending, no growth after 2d   8/10 BC - red port Pending   8/8 Mumps PCR - throat swab Pending                         Vital Signs and other clinical features:      Temperature:  Last 72h:      Last 7d:      Imaging:

## 2017-08-10 NOTE — PROGRESS NOTES
08/10/17 0900   Quick Adds   Type of Visit Initial PT Evaluation   Living Environment   Lives With spouse   Living Arrangements house   Home Accessibility stairs to enter home   Number of Stairs to Enter Home 3   Number of Stairs Within Home 0   Stair Railings at Home none   Transportation Available family or friend will provide;car   Living Environment Comment Pt lives with supportive  who can be around to assist.   Self-Care   Usual Activity Tolerance good   Current Activity Tolerance moderate   Regular Exercise yes   Activity/Exercise Type (Working with PT/OT at hospital/ARC)   Exercise Amount/Frequency daily   Equipment Currently Used at Home walker, rolling   Activity/Exercise/Self-Care Comment Pt used to work as a  for [employer]   Functional Level Prior   Ambulation 0-->independent   Transferring 0-->independent   Fall history within last six months no   Which of the above functional risks had a recent onset or change? ambulation;transferring   Prior Functional Level Comment Has been using FWW for few months while in hospital.   General Information   Onset of Illness/Injury or Date of Surgery - Date 08/08/17   Patient/Family Goals Statement Wants to get up and walk.   Pertinent History of Current Problem (include personal factors and/or comorbidities that impact the POC) Amelia Michel is a 55 y/o F PMH RA, cardiac arrest c/b extravasation LUE wound, A fib/Flutter (not on anticoagulation r/t TCP), and DLBCL (now s/p 1 cycle R-EPOCH D1 7/28/17) who transferred from ARU 2/2 to low grade fever, GPC bacteremia (strep pneumoniae), & parotitis (w/o abscess on CT).     Precautions/Limitations fall precautions;immunosuppressed   General Observations Pt supine in bed; agreeable to session.   Cognitive Status Examination   Orientation orientation to person, place and time   Level of Consciousness alert   Follows Commands and Answers Questions 100% of the time   Personal Safety and  "Judgment intact   Memory intact   Pain Assessment   Patient Currently in Pain No   Integumentary/Edema   Integumentary/Edema no deficits were identifed   Integumentary/Edema Comments JOBST donned   Posture    Posture Forward head position;Protracted shoulders   Posture Comments Mild   Range of Motion (ROM)   ROM Comment B LE AROM grossly WFL   Strength   Strength Comments B LE strength at least 3+/5. Mild deconditioning noted.   Bed Mobility   Bed Mobility Comments Supine>sit: min A    Transfer Skills   Transfer Comments Sit>stand: CGA up to fWW.   Gait   Gait Comments Amb 15' with CGA/FWW. No LOB. Mild forward flexed posture and minimal trunk sway.   Balance   Balance Comments CGA provided for standing dynamic balance with FWW, CGA for dynamic balance with IV pole.   Sensory Examination   Sensory Perception no deficits were identified   Coordination   Coordination no deficits were identified   General Therapy Interventions   Planned Therapy Interventions balance training;gait training;neuromuscular re-education;strengthening;transfer training;stretching;home program guidelines;progressive activity/exercise   Clinical Impression   Criteria for Skilled Therapeutic Intervention yes, treatment indicated   PT Diagnosis Impaired functional mobility   Influenced by the following impairments Deconditioning, fair posture, blood counts   Functional limitations due to impairments Bed mobility, transfers, gait, balance, endurance   Clinical Presentation Stable/Uncomplicated   Clinical Presentation Rationale clinical judgement   Clinical Decision Making (Complexity) Low complexity   Therapy Frequency` 5 times/week   Predicted Duration of Therapy Intervention (days/wks) 2 weeks   Anticipated Discharge Disposition Home with Assist;Home with Home Therapy;Transitional Care Facility   Risk & Benefits of therapy have been explained Yes   Patient, Family & other staff in agreement with plan of care Yes   [facility] AM-PAC  \"6 " "Clicks\" V.2 Basic Mobility Inpatient Short Form   1. Turning from your back to your side while in a flat bed without using bedrails? 3 - A Little   2. Moving from lying on your back to sitting on the side of a flat bed without using bedrails? 3 - A Little   3. Moving to and from a bed to a chair (including a wheelchair)? 3 - A Little   4. Standing up from a chair using your arms (e.g., wheelchair, or bedside chair)? 3 - A Little   5. To walk in hospital room? 3 - A Little   6. Climbing 3-5 steps with a railing? 2 - A Lot   Basic Mobility Raw Score (Score out of 24.Lower scores equate to lower levels of function) 17   Total Evaluation Time   Total Evaluation Time (Minutes) 12     "

## 2017-08-10 NOTE — PROGRESS NOTES
[facility], [place] -- Hematology / Oncology Progress Note  Date of Service (when I saw the patient): -- 08/10/2017     Assessment & Plan   Amelia Michel is a 55 y/o F PMH RA, cardiac arrest c/b extravasation LUE wound, A fib/Flutter (not on anticoagulation r/t TCP), and DLBCL (now s/p 1 cycle R-EPOCH D1 7/28/17) who transferred from ARU 2/2 to low grade fever, GPC bacteremia (strep mitis), & parotitis (w/o abscess on CT).      Acute Issues  # Strep mitis-intermediate resistance bacteremia likely cause of neutropenic fever.  BCx + on 8/7/17 (positive in PICC & periphery)... NTD on 8/8/17 thus far.  Other aspects of neutropenic fever work up completed include (UCx--has long term brand, NTD.  CXR showing R slide pleural eff & associated opasities improved but b/l periphilar opacities new infection vs. pulm edema, however patient asymptomatic).   - Repeat blood culture 8/9.  - 14 day course of Ertapenem based on sensitivites (Vanc d/c with negative result on MecA & Van A/B gene).  Saw note from microbiology team (I was worried about ceftriaxone with her PCN allergy).  - Consider pulling PICC line if re-spikes fever, or blood Cx turn positive, or at d/c r/t necessity (?).  8/8 & 8/10 B Cx are NTD.  - Mumps (result pending).  - Continue GCSF.    # Parotitis, identified on clinical exam & also on CT (no evidence of abscess).  PTA lasix 20 mg held r/t dehydration as possible cause.    - Monitor, continues on Ertapenem.  - Patient reports pain & swelling are improving.      # A fib/flutter.  Rate controlled.  TACHYCARDIC ON 8/9.  S/P 20 mg diltiazem.  Patient was asymptomatic & HD stable during episode. Atenolol dose increased.  - Continue PTA dig/atenolol (at increased BID dose).    - No anticoagulation 2/2 to thrombocytopenia.  - Optimize electrolyte & fluid status (high s/s, continue to hold diuretics).  - Consider EP consult tomorrow if goes back into a fib w rvr.    # Mucositis.  L lateral  tongue 2/2 neutropenia--chemotherapy.  - Continue MMW.    # Diffuse Large B Cell Lymphoma.  Stage IV with BM & liver involvement.  S/P cycle 1 DA-R-EPOCH D1 7/28/17 tolerated well however with Rituxan had SOB/hypotension.  Was started on daily neupogen (r/t ARU) 8/2-.  At ARU was on PPX (levaquin, ACY, & Fluc).    - Liver biopsy showed DLBCL with non germinal center on FISH the atypical lymphoid population is positive for CD20, Bcl6, MUM-1 and c-myc, and negative for CD3, CD10,CD5 and cyclin-D1. Background T cells are CD3 and CD5 positive. EBV negative.    - Patient will switch to [facility] for next cycle as not a [facility] (per Jennifer/Susana).    # Pancytopenia 2/2 to chemotherapy & underlying DLBCL with BM involvement.  - Transfuse to keep HgB > 7.  - Transfuse to keep PLT > 10.  - CBC daily.    Chronic Issues  # Rheumatoid arthritis. History of seropositive rheumatoid arthritis (anti-CCP positive) since late 1980;s w/ multiple MTP osteotomies, partial right wrist fusion, longstanding therapy consisting of MTX, prednisone and HCQ   -Continue 5 mg PO prednisone.  - Follow-up with [facility]ology clinic Dr. Conor Cunha in 4-6 weeks after discharge    # LUE extravasation wound 2/2 OSH cardiac arrest.  Continue PTA wound cares (from ARU & re consult our WOC team, no expanding erythema to explain cause of neutropenic fever.  Slowly improving per patient report.  Clean wound & skin around wound with microklenz.  Paint Cavilon no sting barrier around wound.  Apply medihoney nickel thick to areas with slough.  Cover with 2 mepilex border dressings.  Dressed every other day by wound care at ARU.  - WOC consult.    # OSH cardiac arrest. 5/29/17.  Etiology unclear possibly r/t AV prieto blocking agents.  Meets critieria for secondary prevention ICD but placement is on hold r/t LUE extravasation wound & tx. Of lymphoma.  EP discussed risk vs. Benefit of Life Vest patient defer.  Plan for f/u in cardiology with [facility] in 2-3  mo.    # Anasarca/volume overload.  Improving, trace edema b/l LE on exam.  CXR maybe with pulmonary edema.  No crackles on exam.  - Hold PTA 20 mg IV lasix (r/t parotitis).  - Gentle with IVF hydration.      # Hyperuricemia.    - Continue allopurinol.    # H/O saddle anethesia/urinary incontinence.  Urodynamic study outpatient.  - continue brand, urine Cx NTD.    # Steroid induced hyperglycemia.  Resolved (only on 5 of pred for RA).  - Not on insulin check BG with AM labs.    # Aortic masses on BRE.  Not endocarditis.  Lymphoma (?).  Continue chemotherapy.    # LFT derangement 2/2 to lymphoma involvement of liver.    - Trend LFTs.    MISC  FEN IVF d/c'ed with HD stability & good po intake Na improving will monitor tomm.  Electrolyte s/s (diuretic on hold, good PO intake reported).  RDAT.  PPX not on GI ppx, not on DVT/VTE r/t TCP  DISPO hopefully TCU on 8/11, recommended Lin f/u with them today if she's able.    Full Code    Betty Jon  [facility]  [phone]  Hematology/Oncology  August 10, 2017    Interval History   Bree reports feeling well this AM.  Asked about A fib episode ON she reports no symptoms.  Denies dizziness, SOB, CP, MONTALVO, syncope.  Reports she was just sitting in the chair visiting.  No reports of AMS.  No fever/chills ON.  Continues on antibiotics, will optimize lytes status.  Reports good PO intake, no N/V.  Will continue to work with therapy until placement can be moved back to ARU.    Physical Exam   Vitals:    08/08/17 1709 08/09/17 0801 08/10/17 0737   Weight: 55.8 kg (123 lb 1.6 oz) 56.2 kg (123 lb 14.4 oz) 55.7 kg (122 lb 12.8 oz)     Vital Signs with Ranges  Temp:  [97.3  F (36.3  C)-98.8  F (37.1  C)] 98  F (36.7  C)  Pulse:  [100-150] 100  Heart Rate:  [] 70  Resp:  [14-16] 16  BP: (114-146)/(60-95) 130/73  SpO2:  [96 %-99 %] 99 %  I/O last 3 completed shifts:  In: 2184 [P.O.:1450; I.V.:734]  Out: 3320 [Urine:3320]    Constitutional: Awake, alert, cooperative, in NAD,  thin/frail.  Eyes: PERRL, EOMI, sclera clear, conjunctiva normal.  ENT: Normocephalic, without obvious abnormality, moist mucus membranes, MMW residue present, mouth sore L tongue.  + for bilateral lower cheek/neck swelling (TTP).    Respiratory: Non-labored breathing, good air exchange, CTAB, no crackles or wheezing.  Cardiovascular: RRR, no murmur noted.  GI: +BS, soft, non-distended, non-tender, no masses palpated, no hepatosplenomegaly.  Skin: No concerning lesions or rash other than mepilex on L arm (hx. Extravasation).  Musculoskeletal: 1+ edema b/l LE with wraps in place, LUE wound with mepilex covering.    Neurologic: Awake, A&O x 3. Cranial nerves II-XII are grossly intact.   Neuropsychiatric: Calm, normal affect.    MEDS  Medications     - MEDICATION INSTRUCTIONS -         ertapenem (INVanz) IV  1 g Intravenous Q24H     atenolol  25 mg Oral BID     acyclovir  400 mg Oral BID     calcium-vitamin D  2 tablet Oral QAM     digoxin  125 mcg Oral Daily     filgrastim 15 mcg/mL (in Dextrose)  480 mcg Intravenous Daily     fluconazole  200 mg Oral Daily     folic acid  1 mg Oral Daily     gabapentin  200 mg Oral TID     multivitamin, therapeutic with minerals  1 tablet Oral Daily     predniSONE  5 mg Oral Daily     thiamine  50 mg Oral Daily     heparin lock flush  5-10 mL Intracatheter Q24H     allopurinol  300 mg Oral Daily     ascorbic acid  500 mg Oral Daily     lidocaine visc 2% & maalox/mylanta w/simethicone & diphenhydramine  10 mL Swish & Spit 4x Daily AC & HS     LABS  Recent Labs   Lab Test  08/09/17   0339  08/08/17   0548  08/07/17   0541  08/06/17   0547  08/05/17   1012   08/04/17   0204  08/03/17   0309  08/02/17   0402  08/01/17   0207  07/31/17   0331   WBC  0.1*  0.1*  0.2*  0.3*  0.4*   < >  1.6*  3.0*  2.2*  2.4*  2.3*   NEUTROPHIL   --    --    --    --    --    --   86.8  93.9  77.8  82.8  77.9   HGB  8.3*  7.7*  8.0*  8.2*  8.5*   < >  8.1*  8.6*  8.9*  7.9*  8.3*   PLT  40*  3*  15*  11*   10*   < >  15*  19*  19*  18*  20*    < > = values in this interval not displayed.     Recent Labs   Lab Test  08/09/17   0339  08/08/17   1846  08/07/17   0541  08/06/17   0547  08/05/17   0659  08/04/17   1605   NA  131*  130*  137   --   134  131*   POTASSIUM  3.6  4.2  4.2  4.0  4.0  4.6   CHLORIDE  98  98  95   --   90*  90*   CO2  24  26  33*   --   34*  33*   ANIONGAP  8  6  9   --   10  8   BUN  13  18  23   --   33*  34*   CR  0.50*  0.48*  0.49*  0.51*  0.50*  0.53   VIKTORIYA  8.9  9.3  9.0   --   8.8  8.4*     Recent Labs   Lab Test  08/07/17   0541  08/06/17   0825  08/05/17   0659  08/04/17   0204   08/03/17   0309   08/02/17   0402   MAG  2.2  2.0  2.0  2.2   < >  2.4*   < >  2.0   PHOS  3.5   --    --   3.1   --   2.3*   --   2.9   LDH   --    --    --   314*   --   293*   --   302*   URIC  2.3*   --    --   3.5   --    --    --   3.3    < > = values in this interval not displayed.     Recent Labs   Lab Test  08/09/17   0339  08/07/17   0541  08/05/17   0659  08/04/17   0204  08/03/17   0309  08/02/17   0402   BILITOTAL  1.1  1.2  1.1  1.2  1.2  1.3   ALKPHOS  149  189*  226*  232*  234*  261*   ALT  72*  115*  119*  108*  91*  86*   AST  19  63*  69*  75*  69*  82*   ALBUMIN  2.9*  3.3*  3.3*  3.1*  2.9*  2.8*   LDH   --    --    --   314*  293*  302*     CULTURES    Recent Labs   Lab Test  08/10/17   0802  08/08/17   1832  08/08/17   0945  08/07/17   2218  08/07/17   2210   CULT  No growth after 5 hours  No growth after 2 days  No growth after 2 days  <10,000 colonies/mL Enterococcus faecium Susceptibility testing in progress*  Cultured on the 1st day of incubation: Streptococcus mitis  Critical Value/Significant Value, preliminary result only, called to and read   back by ABBEY Olvera at 1405 on 8/8/17 by JEFFERY.  (Note)  POSITIVE for STREPTOCOCCUS PNEUMONIAE by Ariagoraigene multiplex nucleic  acid test. Note: Streptococcus mitis group is known to cross react  with S. pneumoniae. Correlation with  culture results and clinical  presentation is necessary. Final identification and antimicrobial  susceptibility testing will be verified by standard methods.    Specimen tested with GraffitiGeoigene multiplex, gram-positive blood culture  nucleic acid test for the following targets: Staph aureus, Staph  epidermidis, Staph lugdunensis, other Staph species, Enterococcus  faecalis, Enterococcus faecium, Streptococcus species, S. agalactiae,  S. anginosus grp., S. pneumoniae, S. pyogenes, Listeria sp., mecA  (methicillin resistance) and Se/B (vancomycin resistance).    Critical Value/Significant Value called to and read wes k by ZAID REINOSO RN @162 8/8/17. CT  *  Cultured on the 1st day of incubation: Streptococcus mitis  Critical Value/Significant Value, preliminary result only, called to and read   back by Marielena Kovacs RN [facility] at 1405 on 8/8/17 by JEFFERY.  Susceptibility testing done on previous specimen  *   SDES  Blood Red port  Blood PURPLEP  Blood Red port  Catheterized Urine  Blood PURPLE PORT  Blood Right Hand       IMAGING  No results found for this or any previous visit (from the past 24 hour(s)).

## 2017-08-10 NOTE — PROGRESS NOTES
Care Coordinator- Discharge Planning     Admission Date/Time:  8/8/2017  Attending MD:  Kelly Gilbert MD  Hematologist: Dr. Rodriguez/[facility]     Data  Date of initial CC assessment:  8/10/2017  Chart reviewed, discussed with interdisciplinary team.   Patient was admitted for: Neutropenic Fever    Copied from provider note: Amelia Michel is a 56 year old female with complex past medical history of prior VSD repair, rheumatoid arthritis on long-term methotrexate, recent dx atrial fibrillation/flutter/SVT who was recently admitted to [facility] on 5/29 after an out of hospital cardiac arrest thought to be related to AV prieto blocking agents (angio normal). She made a full recovery and was discharged to rehab on 6/15 with a lifevest. Readmitted from rehab 06/19/17 for FUO & afib w rvr. With workup of fever and progressive cytopenias, diagnosis of DLBCL was made. Transferred to the Hematology service on 7/26. s/p Cycle 1 R-EPOCH.   Readmitted to [place], patient seen on other campus on Monday 8/7/17       Assessment  Concerns with insurance coverage for discharge needs: None.  Current Living Situation: Patient lives with spouse.  Support System: Supportive and Involved  Services Involved: Acute Rehab  Transportation: Ambulance  Barriers to Discharge: Medical Stability     Coordination of Care and Referrals: TBD    Therapy recommendations are pending. Anticipate return to ARU.      Plan  Anticipated Discharge Date:  8/12/2017  Anticipated Discharge Plan:  ARU vs TCU    CTS Handoff completed:  NO (to be sent upon dc)    Gloria Rhoades RNCC  Phone [phone]  Pager [phone]

## 2017-08-10 NOTE — PLAN OF CARE
Problem: Goal Outcome Summary  Goal: Goal Outcome Summary  Outcome: Improving  AVSS. Patient received potassium, magnesium and sodium phos replacement today. Patient offers no complaints. Patient showered, has been up in the chair and walked the halls x2. Anticipating going to rehab soon.     Problem: Infection, Risk/Actual (Adult)  Goal: Identify Related Risk Factors and Signs and Symptoms  Related risk factors and signs and symptoms are identified upon initiation of Human Response Clinical Practice Guideline (CPG)     08/08/17 1749 08/10/17 1724   Infection, Risk/Actual   Infection, Risk/Actual: Related Risk Factors prolonged hospitalization;skin integrity impairment --    Signs and Symptoms (Infection, Risk/Actual) --  weakness;cultures positive

## 2017-08-11 ENCOUNTER — APPOINTMENT (OUTPATIENT)
Dept: PHYSICAL THERAPY | Facility: CLINIC | Age: 57
DRG: 871 | End: 2017-08-11
Attending: INTERNAL MEDICINE
Payer: COMMERCIAL

## 2017-08-11 ENCOUNTER — APPOINTMENT (OUTPATIENT)
Dept: OCCUPATIONAL THERAPY | Facility: CLINIC | Age: 57
DRG: 871 | End: 2017-08-11
Attending: NURSE PRACTITIONER
Payer: COMMERCIAL

## 2017-08-11 LAB
ALBUMIN SERPL-MCNC: 2.9 G/DL (ref 3.4–5)
ALP SERPL-CCNC: 154 U/L (ref 40–150)
ALT SERPL W P-5'-P-CCNC: 72 U/L (ref 0–50)
ANION GAP SERPL CALCULATED.3IONS-SCNC: 7 MMOL/L (ref 3–14)
AST SERPL W P-5'-P-CCNC: 40 U/L (ref 0–45)
BACTERIA SPEC CULT: ABNORMAL
BILIRUB SERPL-MCNC: 0.7 MG/DL (ref 0.2–1.3)
BUN SERPL-MCNC: 20 MG/DL (ref 7–30)
CALCIUM SERPL-MCNC: 8.8 MG/DL (ref 8.5–10.1)
CHLORIDE SERPL-SCNC: 106 MMOL/L (ref 94–109)
CO2 SERPL-SCNC: 24 MMOL/L (ref 20–32)
CREAT SERPL-MCNC: 0.44 MG/DL (ref 0.52–1.04)
DIFFERENTIAL METHOD BLD: ABNORMAL
ERYTHROCYTE [DISTWIDTH] IN BLOOD BY AUTOMATED COUNT: 22 % (ref 10–15)
GFR SERPL CREATININE-BSD FRML MDRD: ABNORMAL ML/MIN/1.7M2
GLUCOSE SERPL-MCNC: 74 MG/DL (ref 70–99)
HCT VFR BLD AUTO: 24.5 % (ref 35–47)
HGB BLD-MCNC: 8.1 G/DL (ref 11.7–15.7)
LAB SCANNED RESULT: NORMAL
Lab: ABNORMAL
MAGNESIUM SERPL-MCNC: 2.4 MG/DL (ref 1.6–2.3)
MCH RBC QN AUTO: 28.9 PG (ref 26.5–33)
MCHC RBC AUTO-ENTMCNC: 33.1 G/DL (ref 31.5–36.5)
MCV RBC AUTO: 88 FL (ref 78–100)
MICRO REPORT STATUS: ABNORMAL
MICROORGANISM SPEC CULT: ABNORMAL
PHOSPHATE SERPL-MCNC: 2.3 MG/DL (ref 2.5–4.5)
PLATELET # BLD AUTO: 17 10E9/L (ref 150–450)
POTASSIUM SERPL-SCNC: 4 MMOL/L (ref 3.4–5.3)
PROT SERPL-MCNC: 5.9 G/DL (ref 6.8–8.8)
RBC # BLD AUTO: 2.8 10E12/L (ref 3.8–5.2)
SODIUM SERPL-SCNC: 137 MMOL/L (ref 133–144)
SPECIMEN SOURCE: ABNORMAL
WBC # BLD AUTO: 0.5 10E9/L (ref 4–11)

## 2017-08-11 PROCEDURE — 25000132 ZZH RX MED GY IP 250 OP 250 PS 637: Performed by: NURSE PRACTITIONER

## 2017-08-11 PROCEDURE — 25000128 H RX IP 250 OP 636: Performed by: INTERNAL MEDICINE

## 2017-08-11 PROCEDURE — 97166 OT EVAL MOD COMPLEX 45 MIN: CPT | Mod: GO | Performed by: OCCUPATIONAL THERAPIST

## 2017-08-11 PROCEDURE — 97116 GAIT TRAINING THERAPY: CPT | Mod: GP | Performed by: PHYSICAL THERAPIST

## 2017-08-11 PROCEDURE — 97530 THERAPEUTIC ACTIVITIES: CPT | Mod: GP | Performed by: PHYSICAL THERAPIST

## 2017-08-11 PROCEDURE — 25000132 ZZH RX MED GY IP 250 OP 250 PS 637: Performed by: INTERNAL MEDICINE

## 2017-08-11 PROCEDURE — 80053 COMPREHEN METABOLIC PANEL: CPT | Performed by: INTERNAL MEDICINE

## 2017-08-11 PROCEDURE — 25000128 H RX IP 250 OP 636: Performed by: NURSE PRACTITIONER

## 2017-08-11 PROCEDURE — 20000002 ZZH R&B BMT INTERMEDIATE

## 2017-08-11 PROCEDURE — 25000125 ZZHC RX 250: Performed by: INTERNAL MEDICINE

## 2017-08-11 PROCEDURE — 84100 ASSAY OF PHOSPHORUS: CPT | Performed by: INTERNAL MEDICINE

## 2017-08-11 PROCEDURE — 83735 ASSAY OF MAGNESIUM: CPT | Performed by: INTERNAL MEDICINE

## 2017-08-11 PROCEDURE — 99212 OFFICE O/P EST SF 10 MIN: CPT

## 2017-08-11 PROCEDURE — 40000133 ZZH STATISTIC OT WARD VISIT: Performed by: OCCUPATIONAL THERAPIST

## 2017-08-11 PROCEDURE — 85025 COMPLETE CBC W/AUTO DIFF WBC: CPT | Performed by: INTERNAL MEDICINE

## 2017-08-11 PROCEDURE — 97110 THERAPEUTIC EXERCISES: CPT | Mod: GO | Performed by: OCCUPATIONAL THERAPIST

## 2017-08-11 PROCEDURE — 97535 SELF CARE MNGMENT TRAINING: CPT | Mod: GO | Performed by: OCCUPATIONAL THERAPIST

## 2017-08-11 PROCEDURE — 40000193 ZZH STATISTIC PT WARD VISIT: Performed by: PHYSICAL THERAPIST

## 2017-08-11 PROCEDURE — 97110 THERAPEUTIC EXERCISES: CPT | Mod: GP | Performed by: PHYSICAL THERAPIST

## 2017-08-11 PROCEDURE — 87040 BLOOD CULTURE FOR BACTERIA: CPT | Performed by: NURSE PRACTITIONER

## 2017-08-11 PROCEDURE — 25000125 ZZHC RX 250: Performed by: NURSE PRACTITIONER

## 2017-08-11 RX ORDER — DIGOXIN 125 MCG
125 TABLET ORAL DAILY
Status: ON HOLD | DISCHARGE
Start: 2017-08-11 | End: 2017-08-18

## 2017-08-11 RX ORDER — ERTAPENEM 1 G/1
1 INJECTION, POWDER, LYOPHILIZED, FOR SOLUTION INTRAMUSCULAR; INTRAVENOUS EVERY 24 HOURS
Qty: 10 EACH | DISCHARGE
Start: 2017-08-11 | End: 2017-08-12

## 2017-08-11 RX ORDER — ACYCLOVIR 400 MG/1
400 TABLET ORAL 2 TIMES DAILY
Qty: 30 TABLET | Status: ON HOLD | DISCHARGE
Start: 2017-08-11 | End: 2017-08-18

## 2017-08-11 RX ORDER — DIPHENHYDRAMINE HYDROCHLORIDE AND LIDOCAINE HYDROCHLORIDE AND ALUMINUM HYDROXIDE AND MAGNESIUM HYDRO
10 KIT
Status: ON HOLD | DISCHARGE
Start: 2017-08-11 | End: 2017-08-18

## 2017-08-11 RX ORDER — LEVOFLOXACIN 250 MG/1
250 TABLET, FILM COATED ORAL DAILY
Refills: 0 | Status: ON HOLD | DISCHARGE
Start: 2017-08-11 | End: 2017-08-18

## 2017-08-11 RX ORDER — AMOXICILLIN 250 MG
1-2 CAPSULE ORAL 2 TIMES DAILY
Qty: 100 TABLET | Status: ON HOLD | DISCHARGE
Start: 2017-08-11 | End: 2017-08-18

## 2017-08-11 RX ORDER — ATENOLOL 25 MG/1
25 TABLET ORAL 2 TIMES DAILY
Status: ON HOLD | DISCHARGE
Start: 2017-08-11 | End: 2017-08-18

## 2017-08-11 RX ORDER — PREDNISONE 5 MG/1
5 TABLET ORAL DAILY
Status: ON HOLD | DISCHARGE
Start: 2017-08-11 | End: 2017-08-18

## 2017-08-11 RX ORDER — POTASSIUM CHLORIDE 1500 MG/1
20 TABLET, EXTENDED RELEASE ORAL DAILY
Qty: 90 TABLET | Status: ON HOLD | DISCHARGE
Start: 2017-08-11 | End: 2017-08-18

## 2017-08-11 RX ORDER — GABAPENTIN 100 MG/1
200 CAPSULE ORAL 3 TIMES DAILY
Qty: 180 CAPSULE | Refills: 3 | Status: ON HOLD | DISCHARGE
Start: 2017-08-11 | End: 2017-08-18

## 2017-08-11 RX ORDER — TRAMADOL HYDROCHLORIDE 50 MG/1
25 TABLET ORAL EVERY 6 HOURS PRN
Qty: 28 TABLET | Status: ON HOLD | DISCHARGE
Start: 2017-08-11 | End: 2017-08-18

## 2017-08-11 RX ORDER — MULTIPLE VITAMINS W/ MINERALS TAB 9MG-400MCG
1 TAB ORAL DAILY
Qty: 30 EACH | Refills: 0 | Status: ON HOLD | DISCHARGE
Start: 2017-08-11 | End: 2017-08-18

## 2017-08-11 RX ORDER — CETIRIZINE HYDROCHLORIDE 10 MG/1
10 TABLET ORAL DAILY PRN
Qty: 30 TABLET | Status: ON HOLD | DISCHARGE
Start: 2017-08-11 | End: 2017-08-18

## 2017-08-11 RX ORDER — ASCORBIC ACID 500 MG
500 TABLET ORAL DAILY
Status: ON HOLD | DISCHARGE
Start: 2017-08-11 | End: 2017-08-18

## 2017-08-11 RX ORDER — ALLOPURINOL 300 MG/1
300 TABLET ORAL DAILY
Qty: 30 TABLET | Status: ON HOLD | DISCHARGE
Start: 2017-08-11 | End: 2017-08-18

## 2017-08-11 RX ORDER — ACETAMINOPHEN 325 MG/1
650 TABLET ORAL EVERY 4 HOURS PRN
Qty: 100 TABLET | DISCHARGE
Start: 2017-08-11 | End: 2017-09-06

## 2017-08-11 RX ORDER — FOLIC ACID 1 MG/1
1 TABLET ORAL DAILY
Qty: 30 TABLET | Status: ON HOLD | DISCHARGE
Start: 2017-08-11 | End: 2017-08-18

## 2017-08-11 RX ORDER — FLUCONAZOLE 200 MG/1
200 TABLET ORAL DAILY
Qty: 30 TABLET | Status: ON HOLD | DISCHARGE
Start: 2017-08-11 | End: 2017-08-18

## 2017-08-11 RX ADMIN — FOLIC ACID 1 MG: 1 TABLET ORAL at 09:10

## 2017-08-11 RX ADMIN — ALUMINUM HYDROXIDE, MAGNESIUM HYDROXIDE, SIMETHICONE 10 ML: 400; 400; 40 SUSPENSION ORAL at 11:59

## 2017-08-11 RX ADMIN — ALUMINUM HYDROXIDE, MAGNESIUM HYDROXIDE, SIMETHICONE 10 ML: 400; 400; 40 SUSPENSION ORAL at 21:50

## 2017-08-11 RX ADMIN — FLUCONAZOLE 200 MG: 200 TABLET ORAL at 09:09

## 2017-08-11 RX ADMIN — ERTAPENEM SODIUM 1 G: 1 INJECTION, POWDER, LYOPHILIZED, FOR SOLUTION INTRAMUSCULAR; INTRAVENOUS at 14:54

## 2017-08-11 RX ADMIN — CALCIUM CARBONATE 600 MG (1,500 MG)-VITAMIN D3 400 UNIT TABLET 2 TABLET: at 09:09

## 2017-08-11 RX ADMIN — SODIUM PHOSPHATE, MONOBASIC, MONOHYDRATE AND SODIUM PHOSPHATE, DIBASIC, ANHYDROUS 15 MMOL: 276; 142 INJECTION, SOLUTION INTRAVENOUS at 09:12

## 2017-08-11 RX ADMIN — ATENOLOL 25 MG: 25 TABLET ORAL at 20:25

## 2017-08-11 RX ADMIN — DIGOXIN 125 MCG: 125 TABLET ORAL at 09:09

## 2017-08-11 RX ADMIN — GABAPENTIN 200 MG: 100 CAPSULE ORAL at 14:54

## 2017-08-11 RX ADMIN — PREDNISONE 5 MG: 5 TABLET ORAL at 09:10

## 2017-08-11 RX ADMIN — GABAPENTIN 200 MG: 100 CAPSULE ORAL at 20:25

## 2017-08-11 RX ADMIN — SODIUM CHLORIDE, PRESERVATIVE FREE 5 ML: 5 INJECTION INTRAVENOUS at 20:28

## 2017-08-11 RX ADMIN — ACYCLOVIR 400 MG: 400 TABLET ORAL at 20:25

## 2017-08-11 RX ADMIN — MULTIPLE VITAMINS W/ MINERALS TAB 1 TABLET: TAB at 09:09

## 2017-08-11 RX ADMIN — THIAMINE HCL (VITAMIN B1) 50 MG TABLET 50 MG: at 09:09

## 2017-08-11 RX ADMIN — GABAPENTIN 200 MG: 100 CAPSULE ORAL at 09:10

## 2017-08-11 RX ADMIN — ATENOLOL 25 MG: 25 TABLET ORAL at 09:09

## 2017-08-11 RX ADMIN — Medication 480 MCG: at 20:25

## 2017-08-11 RX ADMIN — ACYCLOVIR 400 MG: 400 TABLET ORAL at 09:09

## 2017-08-11 RX ADMIN — POTASSIUM CHLORIDE 20 MEQ: 750 TABLET, EXTENDED RELEASE ORAL at 09:11

## 2017-08-11 RX ADMIN — ALUMINUM HYDROXIDE, MAGNESIUM HYDROXIDE, SIMETHICONE 10 ML: 400; 400; 40 SUSPENSION ORAL at 15:36

## 2017-08-11 RX ADMIN — ALUMINUM HYDROXIDE, MAGNESIUM HYDROXIDE, SIMETHICONE 10 ML: 400; 400; 40 SUSPENSION ORAL at 09:10

## 2017-08-11 RX ADMIN — ASCORBIC ACID TAB 250 MG 500 MG: 250 TAB at 09:09

## 2017-08-11 RX ADMIN — ALLOPURINOL 300 MG: 300 TABLET ORAL at 09:10

## 2017-08-11 NOTE — PROGRESS NOTES
[facility], [place]    Progress Note  Hematology / Oncology     Date of Admission:  8/8/2017  Hospital Day #: 3   Date of Service (when I saw the patient): 08/11/2017    Assessment & Plan   Amelia Michel is a 57 y/o female with PMH of RA, cardiac arrest, extravasation LUE wound, A fib/Flutter (not on anticoagulation r/t TCP), and DLBCL (now s/p 1 cycle R-EPOCH D1=7/28/17) who transferred from ARU 2/2 to low grade fever, GPC bacteremia (strep mitis), & parotitis (w/o abscess on CT). She is now afebrile and medically ready to discharge.     ID  # Strep mitis-intermediate resistance bacteremia likely cause of neutropenic fever.  BCx + on 8/7/17 (positive in PICC & periphery). NTD on 8/8/17 thus far.  Other aspects of neutropenic fever work up completed include (UCx--has long term brand, NTD.  CXR showing R slide pleural eff & associated opasities improved but b/l periphilar opacities new infection vs. pulm edema, however patient asymptomatic).   - Repeat blood culture 8/9.  - 14 day course of Ertapenem based on sensitivites (Vanc d/c with negative result on MecA & Van A/B gene).    - Consider pulling PICC line if re-spikes fever, or blood Cx turn positive, or at d/c r/t necessity (?).  8/8 & 8/10 B Cx are NTD.  - Mumps PCR negative.  - Continue GCSF.     # Parotitis, identified on clinical exam & also on CT (no evidence of abscess). PTA lasix 20 mg held r/t dehydration as possible cause. Mumps PCR negative.  - Monitor, continues on Ertapenem.  - Patient reports pain & swelling are improving.      # Mucositis.  L lateral tongue, lower gum line, R upper buccal mucosa 2/2 neutropenia from recent chemotherapy.  - Continue MMW.    CV   # A fib/flutter.  Rate controlled. Tachycardic overnight on 8/9. s/p 20 mg diltiazem. Patient was asymptomatic & HD stable during episode. Atenolol dose increased.  - Continue PTA dig/atenolol (at increased BID dose).    - No anticoagulation 2/2 to  thrombocytopenia.  - Optimize electrolyte & fluid status (high s/s, continue to hold diuretics).  - Consider EP consult tomorrow if goes back into a fib w rvr.    # OSH cardiac arrest. 5/29/17.  Etiology unclear possibly r/t AV prieto blocking agents.  Meets critieria for secondary prevention ICD but placement is on hold r/t LUE extravasation wound & tx. Of lymphoma.  EP discussed risk vs. Benefit of Life Vest patient defer.  Plan for f/u in cardiology with [facility] in 2-3 mo.     # Anasarca/volume overload.  Improving, trace edema b/l LE on exam.  CXR maybe with pulmonary edema.  No crackles on exam.  - Hold PTA 20 mg IV lasix (r/t parotitis).  - Gentle with IVF hydration.      # Aortic masses on BRE.  Not endocarditis.  Lymphoma (?).  Continue chemotherapy.    HEME  # Diffuse Large B Cell Lymphoma.  Stage IV with BM & liver involvement.  S/P cycle 1 DA-R-EPOCH D1 7/28/17 tolerated well however with Rituxan had SOB/hypotension.  Was started on daily neupogen (r/t ARU) 8/2-.  At ARU was on PPX (levaquin, ACY, & Fluc).    - Liver biopsy showed DLBCL with non germinal center on FISH the atypical lymphoid population is positive for CD20, Bcl6, MUM-1 and c-myc, and negative for CD3, CD10,CD5 and cyclin-D1. Background T cells are CD3 and CD5 positive. EBV negative.    - Patient will switch to [facility] for next cycle as not a [facility] (per Jennifer/Susana).     # Pancytopenia 2/2 to chemotherapy & underlying DLBCL with BM involvement.  - Transfuse to keep HgB > 7.  - Transfuse to keep PLT > 10.  - CBC daily.    # Hyperuricemia.    - Continue allopurinol.    RHEUM  # Rheumatoid arthritis. History of seropositive rheumatoid arthritis (anti-CCP positive) since late 1980;s w/ multiple MTP osteotomies, partial right wrist fusion, longstanding therapy consisting of MTX, prednisone and HCQ   -Continue 5 mg PO prednisone.  - Follow-up with [facility] Rheumatology clinic Dr. Conor Cunha in 4-6 weeks after discharge    DERM   # LUE extravasation  wound 2/2 OSH cardiac arrest.  Continue PTA wound cares (from ARU & re consult our WOC team, no expanding erythema to explain cause of neutropenic fever.  Slowly improving per patient report.  Clean wound & skin around wound with microklenz.  Paint Cavilon no sting barrier around wound.  Apply medihoney nickel thick to areas with slough.  Cover with 2 mepilex border dressings.  Dressed every other day by wound care at ARU.  - WOC consult.    NEURO    # H/O saddle anethesia/urinary incontinence.  Urodynamic study outpatient.  - continue brand, urine Cx NTD.    ENDO   # Steroid induced hyperglycemia.  Resolved (only on 5 of pred for RA).  - Not on insulin check BG with AM labs.    GI   # LFT derangement 2/2 to lymphoma involvement of liver.    - Trend LFTs.     MISC  FEN IVF d/c'ed with HD stability & good po intake Na improving will monitor tomm.  Electrolyte s/s (diuretic on hold, good PO intake reported).  RDAT.  PPX not on GI ppx, not on DVT/VTE r/t TCP     Code Status: FULL  Disposition: Plan to return to ARU. Medically ready to leave. SW kindly assisting with placement, no bed today but potentially over the weekend.    This plan of care has been discussed with the staff physician, Dr. Gilbert.    Shyann Marinelli PA-C  Hematology/Oncology  Pager: [phone]    Interval History   Bree is feeling good this morning. She is motivated to return to ARU. Facial swelling is improved. Mouth sores still present, however is able to eat some.     Vital Signs with Ranges  Temp:  [97.7  F (36.5  C)-98  F (36.7  C)] 97.7  F (36.5  C)  Heart Rate:  [68-75] 68  Resp:  [14-16] 16  BP: (127-137)/(57-74) 127/64  SpO2:  [97 %-99 %] 97 %    I/O last 3 completed shifts:  In: 1479 [P.O.:1080; I.V.:399]  Out: 2075 [Urine:2075]    Vitals:    08/09/17 0801 08/10/17 0737 08/11/17 0751   Weight: 56.2 kg (123 lb 14.4 oz) 55.7 kg (122 lb 12.8 oz) 55.8 kg (123 lb 1.6 oz)       Physical Exam   Constitutional: Pleasant and cooperative  female seen sitting up in a chair. Awake, alert, NAD.  HEENT: NC/AT, EOMI, sclera clear, conjunctiva normal, OP with MMM, lesion on L lateral tongue, R upper buccal mucosa, lower gum line.  Respiratory: No increased work of breathing, CTAB, no crackles or wheezing s;ightly diminished at bases.  Cardiovascular: RRR, no murmur noted. No peripheral edema, stocking in place.  GI: Normal bowel sounds, soft, non-distended and non-tender.  Skin: Warm, dry, well-perfused. Extravasation wound on LUE with two elliptical areas of epidermal loss, area of edema in lower lateral corner of wound area, no discrete mass palpable and nontender, no erythema. No other bruising, bleeding, rashes, or lesions on limited exam.  Neurologic: A&O. Answers questions appropriately. Moves all extremities spontaneously.  Neuropsychiatric: Calm, appropriate affect    Medications     - MEDICATION INSTRUCTIONS -           ertapenem (INVanz) IV  1 g Intravenous Q24H     atenolol  25 mg Oral BID     acyclovir  400 mg Oral BID     calcium-vitamin D  2 tablet Oral QAM     digoxin  125 mcg Oral Daily     filgrastim 15 mcg/mL (in Dextrose)  480 mcg Intravenous Daily     fluconazole  200 mg Oral Daily     folic acid  1 mg Oral Daily     gabapentin  200 mg Oral TID     multivitamin, therapeutic with minerals  1 tablet Oral Daily     predniSONE  5 mg Oral Daily     thiamine  50 mg Oral Daily     heparin lock flush  5-10 mL Intracatheter Q24H     allopurinol  300 mg Oral Daily     ascorbic acid  500 mg Oral Daily     lidocaine visc 2% & maalox/mylanta w/simethicone & diphenhydramine  10 mL Swish & Spit 4x Daily AC & HS       Data   CBC   Recent Labs  Lab 08/11/17  0403 08/10/17  0316 08/09/17  0339 08/08/17  0548   WBC 0.5* 0.2* 0.1* 0.1*   RBC 2.80* 2.99* 2.90* 2.64*   HGB 8.1* 8.9* 8.3* 7.7*   HCT 24.5* 26.4* 25.1* 23.6*   MCV 88 88 87 89   MCH 28.9 29.8 28.6 29.2   MCHC 33.1 33.7 33.1 32.6   RDW 22.0* 22.3* 22.3* 23.2*   PLT 17* 24* 40* 3*       CMP    Recent Labs  Lab 08/11/17  0403 08/10/17  0316 08/09/17  0339 08/08/17  1846 08/07/17  0541 08/06/17  0825    136 131* 130* 137  --    POTASSIUM 4.0 3.8 3.6 4.2 4.2  --    CHLORIDE 106 105 98 98 95  --    CO2 24 24 24 26 33*  --    ANIONGAP 7 8 8 6 9  --    GLC 74 86 83 85 76  --    BUN 20 18 13 18 23  --    CR 0.44* 0.41* 0.50* 0.48* 0.49*  --    GFRESTIMATED >90Non  GFR Calc >90Non  GFR Calc >90Non  GFR Calc >90Non  GFR Calc >90Non  GFR Calc  --    GFRESTBLACK >90African American GFR Calc >90African American GFR Calc >90African American GFR Calc >90African American GFR Calc >90African American GFR Calc  --    VIKTORIYA 8.8 9.2 8.9 9.3 9.0  --    MAG 2.4* 2.0  --   --  2.2 2.0   PHOS 2.3* 2.1*  --   --  3.5  --    PROTTOTAL 5.9* 6.1* 6.0*  --  6.2*  --    ALBUMIN 2.9* 2.8* 2.9*  --  3.3*  --    BILITOTAL 0.7 0.9 1.1  --  1.2  --    ALKPHOS 154* 148 149  --  189*  --    AST 40 17 19  --  63*  --    ALT 72* 61* 72*  --  115*  --        LFTs   Recent Labs  Lab 08/11/17  0403   PROTTOTAL 5.9*   ALBUMIN 2.9*   BILITOTAL 0.7   ALKPHOS 154*   AST 40   ALT 72*       Coagulation Studies   Recent Labs  Lab 08/07/17  0541   INR 1.21*   PTT 30

## 2017-08-11 NOTE — PROGRESS NOTES
Social Work Services Discharge Note      Patient Name:  Amelia Michel     Anticipated Discharge Date:  8/12/2017 - Saturday     Discharge Disposition:   [facility]   [address], 5th [address]  [facility] [address]     Additional Services/Equipment Arranged:  Transport arranged through [employer] (p: [phone]) via wheelchair medical van at 10am.      Patient / Family response to discharge plan:  In agreement with return to ARU     Persons notified of above discharge plan:  Hematology team, 5C RN staff, and [facility] admissions[facility]     CTS Handoff completed:  Will complete upon DC    Medicare Notice of Rights provided to the patient/family:     DENNIS Portillo, ANDIE  7D  (Hem/Onc)  Pager: [phone]  8/11/2017

## 2017-08-11 NOTE — PLAN OF CARE
Problem: Goal Outcome Summary  Goal: Goal Outcome Summary  PT 5C: Pt ambulated 60 ft x 2 with and without FWW. Pt requires min A for balance without FWW, SBA with FWW. Step up/downs with min A. Gait and stair negotiation limited by proximal LE weakness. Issued handout-pt completed seated/supine exercises for proximal strength.  present and supportive/helpful with assisting with exercises as needed.     Recommend: ARU upon discharge

## 2017-08-11 NOTE — PROGRESS NOTES
[facility] Nurse Inpatient Wound Assessment    Re Assessment  Reason for consultation: Evaluate and treat Left arm extravasation wound    ASSESSMENT:   Left arm wound due to extravasation  Status: improved debridement.  Recommend Iodosorb gel instead of Medihoney 2/2 to Neutropenia-current goal is to minimize bioburden to decrease risk of infection   TREATMENT  PLAN  Left upper arm wounds: Clean wound and skin around wound with microklenz.  Paint Cavilon no sting barrier to skin around wound.  Apply Iodosorb gel nickel thick to areas with slough.  Cover with two mepilex border dressings.      Orders revised  WO Nurse follow-up plan: Weekly  Nursing to notify the Provider(s) and re-consult the [facility] Nurse if wound(s) deteriorates or new skin concern.    Patient History  According to provider note(s):  Amelia Michel is a 56 year old female with complex past medical history of prior VSD repair, rheumatoid arthritis on long-term methotrexate, recent dx atrial fibrillation/flutter/SVT who was recently admitted to [facility] on  after an out of hospital cardiac arrest thought to be related to AV prieto blocking agents (angio normal). She made a full recovery and was discharged to rehab on 6/15 with a lifevest. Readmitted from rehab 17 for FUO & afib w rvr. With workup of fever and progressive cytopenias, diagnosis of DLBCL was made. Transferred to the Hematology service on . s/p Cycle 1 R-EPOCH.   Readmitted to [place], plan is for [facility] with next chemo cycle   Objective Data   Containment of urine/stool: Continent of bowel and Continent of bladder  Active Diet Order    Active Diet Order      Regular Diet Adult  Output:  I/O last 3 completed shifts:  In: 1479 [P.O.:1080; I.V.:399]  Out:  [Urine:]    Risk Assessment:   Jay [place] Score  Av  Min: 13  Max: 20                           Labs:     Recent Labs  Lab 17  0403  17  0541   HGB 8.1*  < > 8.0*   INR  --   --  1.21*   WBC 0.5*  < > 0.2*    < > = values in this interval not displayed.    PHYSICAL EXAM:  Skin assessment: left upper arm    Wound Location:  Left  arm  Wound History: IV extravasation wound with large amount of epidermal loss.      8/7/17 8/11/17    Measurements (length x width x depth, in cm)   Proximal to distal: 0.1 x 0.1 x 0.2 cm, 2 x 0.9 x 0.9 cm, 2 x 0.7 x 0.5   0.7 cm of undermining on middle wound from 7-3 o'clock measure 0.7 cm  Wound Base: all with tan slough bases, visible suture in center wound  Palpation of the wound bed: boggy   Periwound skin: intact, closed skin edges  Color: pink  Temperature: normal   Drainage: small  Description of drainage: yellow stained with Medihoney  Odor: none  Pain: minimal with wound cleansing    INTERVENTIONS:  Current support surface: Standard  Atmos Air  Current off-loading measures: Pillows under calves  Visual inspection of wounds(s) completed.  Wound Care: was done per plan of care.  Supplies ordered: Iodosorb  Education provided today: wound care    Discussed plan of care with Patient, family and medical staff   Face to face time: 20 minutes

## 2017-08-11 NOTE — PROGRESS NOTES
08/11/17 1100   Quick Adds   Type of Visit Initial Occupational Therapy Evaluation   Living Environment   Lives With spouse   Living Arrangements house   Home Accessibility stairs to enter home   Number of Stairs to Enter Home 3   Number of Stairs Within Home 0   Stair Railings at Home none   Transportation Available family or friend will provide;car   Living Environment Comment Pt lives with supportive  who can be around to assist.   Self-Care   Dominant Hand right   Usual Activity Tolerance good   Current Activity Tolerance moderate   Regular Exercise yes   Activity/Exercise Type (Working with PT/OT at hospital/ARC)   Exercise Amount/Frequency daily   Equipment Currently Used at Home walker, rolling   Functional Level Prior   Ambulation 0-->independent   Transferring 0-->independent   Toileting 0-->independent   Bathing 0-->independent   Dressing 0-->independent   Eating 0-->independent   Communication 0-->understands/communicates without difficulty   Swallowing 0-->swallows foods/liquids without difficulty   Cognition 0 - no cognition issues reported   Fall history within last six months no   Which of the above functional risks had a recent onset or change? ambulation;transferring   Prior Functional Level Comment Has been using FWW for few months while in hospital.   General Information   Onset of Illness/Injury or Date of Surgery - Date 08/08/17   Referring Physician Betty Jon, ELIZABETH CNP   Patient/Family Goals Statement to dishcarge to rehab   Additional Occupational Profile Info/Pertinent History of Current Problem Amelia Michel is a 55 y/o F PMH RA, cardiac arrest c/b extravasation LUE wound, A fib/Flutter (not on anticoagulation r/t TCP), and DLBCL (now s/p 1 cycle R-EPOCH D1 7/28/17) who transferred from ARU 2/2 to low grade fever, GPC bacteremia (strep pneumoniae), & parotitis (w/o abscess on CT).     Precautions/Limitations immunosuppressed   Cognitive Status Examination    Orientation orientation to person, place and time   Visual Perception   Visual Perception No deficits were identified   Sensory Examination   Sensory Comments OT: Pt reports numbness in right hand baseline   Range of Motion (ROM)   ROM Comment OT: BUE WFL   Strength   Strength Comments OT: BUE overall deconditioned   Muscle Tone Assessment   Muscle Tone Comments OT: BUE overall deconditioned   Mobility   Bed Mobility Comments OT: ind   Transfer Skills   Transfer Comments OT: SBA   Balance   Balance Comments OT: SBA w/ walker   Upper Body Dressing   Level of [place]: Dress Upper Body minimum assist (75% patients effort)   Lower Body Dressing   Level of [place]: Dress Lower Body contact guard   Grooming   Level of [place]: Grooming stand-by assist   Instrumental Activities of Daily Living (IADL)   IADL Comments OT: Pt was ind w/ IADLS, spouse can A prn   Activities of Daily Living Analysis   Impairments Contributing to Impaired Activities of Daily Living balance impaired;strength decreased   General Therapy Interventions   Planned Therapy Interventions ADL retraining;IADL retraining;balance training;strengthening;transfer training;home program guidelines;progressive activity/exercise   Clinical Impression   Criteria for Skilled Therapeutic Interventions Met yes, treatment indicated   OT Diagnosis decreased ind in ADLS/IADLS   Influenced by the following impairments generalized weakness and fatigue   Assessment of Occupational Performance 3-5 Performance Deficits   Identified Performance Deficits decreased ind in ADLS/IADLS   Clinical Decision Making (Complexity) Moderate complexity   Therapy Frequency daily   Predicted Duration of Therapy Intervention (days/wks) 8/17/17   Anticipated Discharge Disposition Transitional Care Facility;Acute Rehabilitation Facility   Risks and Benefits of Treatment have been explained. Yes   Patient, Family & other staff in agreement with plan of care Yes   [employer]  "[place] AM-PAC  \"6 Clicks\" Daily Activity Inpatient Short Form   1. Putting on and taking off regular lower body clothing? 3 - A Little   2. Bathing (including washing, rinsing, drying)? 3 - A Little   3. Toileting, which includes using toilet, bedpan or urinal? 3 - A Little   4. Putting on and taking off regular upper body clothing? 3 - A Little   5. Taking care of personal grooming such as brushing teeth? 4 - None   6. Eating meals? 4 - None   Daily Activity Raw Score (Score out of 24.Lower scores equate to lower levels of function) 20   Total Evaluation Time   Total Evaluation Time (Minutes) 8     "

## 2017-08-11 NOTE — PLAN OF CARE
Problem: Individualization  Goal: Patient Preferences  Outcome: No Change  Vss. Mouth sores and using magic mouthwash. A little nose bleed after a shower. No pain. No nausea. 39 grams of carbs for breakfast and 51 grams for lunch. Worked with pt and ot. The wound care changed the left arm wound dressing. The wound appears deeper than it was last week. Showered. Replaced K+ and phosphorus. Small stool. Waiting for a room in TCU at [place].

## 2017-08-11 NOTE — PLAN OF CARE
Problem: Goal Outcome Summary  Goal: Goal Outcome Summary  OT: 5C: OT eval completed, pt SBA in room and in whitaker ambulation w/ SBA ~150ft w/ walker, pt completed full shower w/ min A seated on shower bench.   Rec: rehab to increase ind in ADLS/IADLS

## 2017-08-11 NOTE — PLAN OF CARE
"Problem: Goal Outcome Summary  Goal: Goal Outcome Summary  /68 (BP Location: Left leg)  Pulse 100  Temp 97.8  F (36.6  C) (Axillary)  Resp 14  Ht 1.465 m (4' 9.68\")  Wt 55.7 kg (122 lb 12.8 oz)  SpO2 98%  BMI 25.95 kg/m2     VSS on RA. A/Ox4. Up with SBA. Huynh in place d/t neurogenic bladder; Adequate urine output. Potassium 4.0- needs   20mEq replacement, will pass onto day RN to give with morning meds. Phos 2.3- needs replacement with 15, message sent to pharmacy to fill and send.  On droplet precautions for r/o mumps. Rash on LUE- dressing c/d/i, WOC nurse to change dressing. Tolerating a regular diet. Will continue to monitor and follow POC.      Plan: Waiting for room available @ TCU. Plan fo d/c within the next few days.       "

## 2017-08-12 ENCOUNTER — APPOINTMENT (OUTPATIENT)
Dept: PHYSICAL THERAPY | Facility: CLINIC | Age: 57
DRG: 871 | End: 2017-08-12
Attending: INTERNAL MEDICINE
Payer: COMMERCIAL

## 2017-08-12 ENCOUNTER — HOSPITAL ENCOUNTER (INPATIENT)
Facility: CLINIC | Age: 57
LOS: 7 days | Discharge: HOME HEALTH | DRG: 871 | End: 2017-08-19
Attending: PHYSICAL MEDICINE & REHABILITATION | Admitting: PHYSICAL MEDICINE & REHABILITATION
Payer: COMMERCIAL

## 2017-08-12 VITALS
HEART RATE: 100 BPM | RESPIRATION RATE: 16 BRPM | SYSTOLIC BLOOD PRESSURE: 147 MMHG | DIASTOLIC BLOOD PRESSURE: 82 MMHG | WEIGHT: 123.5 LBS | TEMPERATURE: 97.5 F | BODY MASS INDEX: 25.92 KG/M2 | OXYGEN SATURATION: 96 % | HEIGHT: 58 IN

## 2017-08-12 DIAGNOSIS — T78.40XD ALLERGIC REACTION, SUBSEQUENT ENCOUNTER: ICD-10-CM

## 2017-08-12 DIAGNOSIS — R78.81 GRAM-POSITIVE BACTEREMIA: ICD-10-CM

## 2017-08-12 DIAGNOSIS — I47.19 ATRIAL TACHYCARDIA (H): ICD-10-CM

## 2017-08-12 DIAGNOSIS — C83.30 DIFFUSE LARGE B-CELL LYMPHOMA, UNSPECIFIED BODY REGION (H): ICD-10-CM

## 2017-08-12 DIAGNOSIS — R53.81 PHYSICAL DECONDITIONING: Primary | ICD-10-CM

## 2017-08-12 DIAGNOSIS — G72.81 CRITICAL ILLNESS MYOPATHY: ICD-10-CM

## 2017-08-12 DIAGNOSIS — R50.81 FEBRILE NEUTROPENIA (H): ICD-10-CM

## 2017-08-12 DIAGNOSIS — D70.9 FEBRILE NEUTROPENIA (H): ICD-10-CM

## 2017-08-12 LAB
ABO + RH BLD: NORMAL
ABO + RH BLD: NORMAL
ALBUMIN SERPL-MCNC: 3.1 G/DL (ref 3.4–5)
ALP SERPL-CCNC: 169 U/L (ref 40–150)
ALT SERPL W P-5'-P-CCNC: 103 U/L (ref 0–50)
ANION GAP SERPL CALCULATED.3IONS-SCNC: 8 MMOL/L (ref 3–14)
ANISOCYTOSIS BLD QL SMEAR: ABNORMAL
AST SERPL W P-5'-P-CCNC: 64 U/L (ref 0–45)
BASOPHILS # BLD AUTO: 0 10E9/L (ref 0–0.2)
BASOPHILS NFR BLD AUTO: 0.9 %
BILIRUB SERPL-MCNC: 0.7 MG/DL (ref 0.2–1.3)
BLD GP AB SCN SERPL QL: NORMAL
BLD PROD TYP BPU: NORMAL
BLD PROD TYP BPU: NORMAL
BLD UNIT ID BPU: 0
BLOOD BANK CMNT PATIENT-IMP: NORMAL
BLOOD PRODUCT CODE: NORMAL
BPU ID: NORMAL
BUN SERPL-MCNC: 17 MG/DL (ref 7–30)
CALCIUM SERPL-MCNC: 9 MG/DL (ref 8.5–10.1)
CHLORIDE SERPL-SCNC: 108 MMOL/L (ref 94–109)
CO2 SERPL-SCNC: 22 MMOL/L (ref 20–32)
CREAT SERPL-MCNC: 0.42 MG/DL (ref 0.52–1.04)
DIFFERENTIAL METHOD BLD: ABNORMAL
EOSINOPHIL # BLD AUTO: 0 10E9/L (ref 0–0.7)
EOSINOPHIL NFR BLD AUTO: 0 %
ERYTHROCYTE [DISTWIDTH] IN BLOOD BY AUTOMATED COUNT: 22.2 % (ref 10–15)
GFR SERPL CREATININE-BSD FRML MDRD: ABNORMAL ML/MIN/1.7M2
GLUCOSE SERPL-MCNC: 71 MG/DL (ref 70–99)
HCT VFR BLD AUTO: 24.1 % (ref 35–47)
HGB BLD-MCNC: 8 G/DL (ref 11.7–15.7)
LYMPHOCYTES # BLD AUTO: 0.2 10E9/L (ref 0.8–5.3)
LYMPHOCYTES NFR BLD AUTO: 17 %
MCH RBC QN AUTO: 29.7 PG (ref 26.5–33)
MCHC RBC AUTO-ENTMCNC: 33.2 G/DL (ref 31.5–36.5)
MCV RBC AUTO: 90 FL (ref 78–100)
MONOCYTES # BLD AUTO: 0.1 10E9/L (ref 0–1.3)
MONOCYTES NFR BLD AUTO: 8.9 %
NEUTROPHILS # BLD AUTO: 0.9 10E9/L (ref 1.6–8.3)
NEUTROPHILS NFR BLD AUTO: 73.2 %
NUM BPU REQUESTED: 1
OVALOCYTES BLD QL SMEAR: SLIGHT
PHOSPHATE SERPL-MCNC: 2.4 MG/DL (ref 2.5–4.5)
PLATELET # BLD AUTO: 17 10E9/L (ref 150–450)
PLATELET # BLD EST: ABNORMAL 10*3/UL
POIKILOCYTOSIS BLD QL SMEAR: SLIGHT
POTASSIUM SERPL-SCNC: 3.9 MMOL/L (ref 3.4–5.3)
PROT SERPL-MCNC: 5.9 G/DL (ref 6.8–8.8)
RBC # BLD AUTO: 2.69 10E12/L (ref 3.8–5.2)
SODIUM SERPL-SCNC: 138 MMOL/L (ref 133–144)
SPECIMEN EXP DATE BLD: NORMAL
TRANSFUSION STATUS PATIENT QL: NORMAL
TRANSFUSION STATUS PATIENT QL: NORMAL
WBC # BLD AUTO: 1.2 10E9/L (ref 4–11)

## 2017-08-12 PROCEDURE — 97530 THERAPEUTIC ACTIVITIES: CPT | Mod: GP | Performed by: PHYSICAL THERAPIST

## 2017-08-12 PROCEDURE — 40000193 ZZH STATISTIC PT WARD VISIT: Performed by: PHYSICAL THERAPIST

## 2017-08-12 PROCEDURE — 86850 RBC ANTIBODY SCREEN: CPT | Performed by: INTERNAL MEDICINE

## 2017-08-12 PROCEDURE — 87040 BLOOD CULTURE FOR BACTERIA: CPT | Performed by: NURSE PRACTITIONER

## 2017-08-12 PROCEDURE — 80053 COMPREHEN METABOLIC PANEL: CPT | Performed by: INTERNAL MEDICINE

## 2017-08-12 PROCEDURE — 25000125 ZZHC RX 250: Performed by: INTERNAL MEDICINE

## 2017-08-12 PROCEDURE — 25000128 H RX IP 250 OP 636: Performed by: INTERNAL MEDICINE

## 2017-08-12 PROCEDURE — 12800006 ZZH R&B REHAB

## 2017-08-12 PROCEDURE — 86900 BLOOD TYPING SEROLOGIC ABO: CPT | Performed by: INTERNAL MEDICINE

## 2017-08-12 PROCEDURE — 86923 COMPATIBILITY TEST ELECTRIC: CPT | Performed by: INTERNAL MEDICINE

## 2017-08-12 PROCEDURE — 25000132 ZZH RX MED GY IP 250 OP 250 PS 637: Performed by: INTERNAL MEDICINE

## 2017-08-12 PROCEDURE — 97116 GAIT TRAINING THERAPY: CPT | Mod: GP | Performed by: PHYSICAL THERAPIST

## 2017-08-12 PROCEDURE — 25000132 ZZH RX MED GY IP 250 OP 250 PS 637: Performed by: NURSE PRACTITIONER

## 2017-08-12 PROCEDURE — 97162 PT EVAL MOD COMPLEX 30 MIN: CPT | Mod: GP | Performed by: PHYSICAL THERAPIST

## 2017-08-12 PROCEDURE — 85025 COMPLETE CBC W/AUTO DIFF WBC: CPT | Performed by: INTERNAL MEDICINE

## 2017-08-12 PROCEDURE — 84100 ASSAY OF PHOSPHORUS: CPT | Performed by: INTERNAL MEDICINE

## 2017-08-12 PROCEDURE — P9016 RBC LEUKOCYTES REDUCED: HCPCS | Performed by: INTERNAL MEDICINE

## 2017-08-12 PROCEDURE — 86901 BLOOD TYPING SEROLOGIC RH(D): CPT | Performed by: INTERNAL MEDICINE

## 2017-08-12 PROCEDURE — 97110 THERAPEUTIC EXERCISES: CPT | Mod: GP | Performed by: PHYSICAL THERAPIST

## 2017-08-12 PROCEDURE — 25000132 ZZH RX MED GY IP 250 OP 250 PS 637: Performed by: PHYSICIAN ASSISTANT

## 2017-08-12 RX ORDER — DIPHENHYDRAMINE HYDROCHLORIDE AND LIDOCAINE HYDROCHLORIDE AND ALUMINUM HYDROXIDE AND MAGNESIUM HYDRO
10 KIT
Status: DISCONTINUED | OUTPATIENT
Start: 2017-08-12 | End: 2017-08-19 | Stop reason: HOSPADM

## 2017-08-12 RX ORDER — DIGOXIN 125 MCG
125 TABLET ORAL DAILY
Status: DISCONTINUED | OUTPATIENT
Start: 2017-08-13 | End: 2017-08-19 | Stop reason: HOSPADM

## 2017-08-12 RX ORDER — POTASSIUM CHLORIDE 750 MG/1
20 TABLET, EXTENDED RELEASE ORAL DAILY
Status: DISCONTINUED | OUTPATIENT
Start: 2017-08-13 | End: 2017-08-19 | Stop reason: HOSPADM

## 2017-08-12 RX ORDER — CETIRIZINE HYDROCHLORIDE 10 MG/1
10 TABLET ORAL DAILY PRN
Status: DISCONTINUED | OUTPATIENT
Start: 2017-08-12 | End: 2017-08-19 | Stop reason: HOSPADM

## 2017-08-12 RX ORDER — ACETAMINOPHEN 325 MG/1
650 TABLET ORAL EVERY 4 HOURS PRN
Status: DISCONTINUED | OUTPATIENT
Start: 2017-08-12 | End: 2017-08-19 | Stop reason: HOSPADM

## 2017-08-12 RX ORDER — ALLOPURINOL 300 MG/1
300 TABLET ORAL DAILY
Status: DISCONTINUED | OUTPATIENT
Start: 2017-08-13 | End: 2017-08-19 | Stop reason: HOSPADM

## 2017-08-12 RX ORDER — ATENOLOL 25 MG/1
25 TABLET ORAL 2 TIMES DAILY
Status: DISCONTINUED | OUTPATIENT
Start: 2017-08-12 | End: 2017-08-19 | Stop reason: HOSPADM

## 2017-08-12 RX ORDER — ACYCLOVIR 400 MG/1
400 TABLET ORAL 2 TIMES DAILY
Status: DISCONTINUED | OUTPATIENT
Start: 2017-08-12 | End: 2017-08-19 | Stop reason: HOSPADM

## 2017-08-12 RX ORDER — NALOXONE HYDROCHLORIDE 0.4 MG/ML
.1-.4 INJECTION, SOLUTION INTRAMUSCULAR; INTRAVENOUS; SUBCUTANEOUS
Status: DISCONTINUED | OUTPATIENT
Start: 2017-08-12 | End: 2017-08-19 | Stop reason: HOSPADM

## 2017-08-12 RX ORDER — MULTIPLE VITAMINS W/ MINERALS TAB 9MG-400MCG
1 TAB ORAL DAILY
Status: DISCONTINUED | OUTPATIENT
Start: 2017-08-13 | End: 2017-08-19 | Stop reason: HOSPADM

## 2017-08-12 RX ORDER — FOLIC ACID 1 MG/1
1 TABLET ORAL DAILY
Status: DISCONTINUED | OUTPATIENT
Start: 2017-08-13 | End: 2017-08-19 | Stop reason: HOSPADM

## 2017-08-12 RX ORDER — CEFTRIAXONE 1 G/1
1000 INJECTION, POWDER, FOR SOLUTION INTRAMUSCULAR; INTRAVENOUS DAILY
Qty: 10 ML | Refills: 0 | Status: ON HOLD | DISCHARGE
Start: 2017-08-12 | End: 2017-08-18

## 2017-08-12 RX ORDER — PREDNISONE 5 MG/1
5 TABLET ORAL DAILY
Status: DISCONTINUED | OUTPATIENT
Start: 2017-08-13 | End: 2017-08-19 | Stop reason: HOSPADM

## 2017-08-12 RX ORDER — LEVOFLOXACIN 250 MG/1
250 TABLET, FILM COATED ORAL DAILY
Status: DISCONTINUED | OUTPATIENT
Start: 2017-08-23 | End: 2017-08-18

## 2017-08-12 RX ORDER — ASCORBIC ACID 500 MG
500 TABLET ORAL DAILY
Status: DISCONTINUED | OUTPATIENT
Start: 2017-08-13 | End: 2017-08-19 | Stop reason: HOSPADM

## 2017-08-12 RX ORDER — GABAPENTIN 100 MG/1
200 CAPSULE ORAL 3 TIMES DAILY
Status: DISCONTINUED | OUTPATIENT
Start: 2017-08-12 | End: 2017-08-19 | Stop reason: HOSPADM

## 2017-08-12 RX ORDER — FLUCONAZOLE 200 MG/1
200 TABLET ORAL DAILY
Status: DISCONTINUED | OUTPATIENT
Start: 2017-08-13 | End: 2017-08-19 | Stop reason: HOSPADM

## 2017-08-12 RX ORDER — AMOXICILLIN 250 MG
1-2 CAPSULE ORAL 2 TIMES DAILY
Status: DISCONTINUED | OUTPATIENT
Start: 2017-08-12 | End: 2017-08-16

## 2017-08-12 RX ORDER — CEFTRIAXONE 1 G/1
1 INJECTION, POWDER, FOR SOLUTION INTRAMUSCULAR; INTRAVENOUS DAILY
Status: DISCONTINUED | OUTPATIENT
Start: 2017-08-12 | End: 2017-08-19 | Stop reason: HOSPADM

## 2017-08-12 RX ADMIN — FOLIC ACID 1 MG: 1 TABLET ORAL at 08:42

## 2017-08-12 RX ADMIN — ATENOLOL 25 MG: 25 TABLET ORAL at 21:50

## 2017-08-12 RX ADMIN — DIPHENHYDRAMINE HYDROCHLORIDE AND LIDOCAINE HYDROCHLORIDE AND ALUMINUM HYDROXIDE AND MAGNESIUM HYDRO 10 ML: KIT at 21:48

## 2017-08-12 RX ADMIN — MULTIPLE VITAMINS W/ MINERALS TAB 1 TABLET: TAB at 08:42

## 2017-08-12 RX ADMIN — SODIUM CHLORIDE, PRESERVATIVE FREE 5 ML: 5 INJECTION INTRAVENOUS at 09:40

## 2017-08-12 RX ADMIN — GABAPENTIN 200 MG: 100 CAPSULE ORAL at 21:47

## 2017-08-12 RX ADMIN — POTASSIUM CHLORIDE 20 MEQ: 750 TABLET, EXTENDED RELEASE ORAL at 08:43

## 2017-08-12 RX ADMIN — FLUCONAZOLE 200 MG: 200 TABLET ORAL at 08:41

## 2017-08-12 RX ADMIN — THIAMINE HCL (VITAMIN B1) 50 MG TABLET 50 MG: at 08:40

## 2017-08-12 RX ADMIN — DIPHENHYDRAMINE HYDROCHLORIDE AND LIDOCAINE HYDROCHLORIDE AND ALUMINUM HYDROXIDE AND MAGNESIUM HYDRO 10 ML: KIT at 18:17

## 2017-08-12 RX ADMIN — CEFTRIAXONE SODIUM 1 G: 1 INJECTION, POWDER, FOR SOLUTION INTRAMUSCULAR; INTRAVENOUS at 13:46

## 2017-08-12 RX ADMIN — PREDNISONE 5 MG: 5 TABLET ORAL at 08:45

## 2017-08-12 RX ADMIN — SODIUM CHLORIDE, PRESERVATIVE FREE 5 ML: 5 INJECTION INTRAVENOUS at 10:47

## 2017-08-12 RX ADMIN — ASCORBIC ACID TAB 250 MG 500 MG: 250 TAB at 08:41

## 2017-08-12 RX ADMIN — GABAPENTIN 200 MG: 100 CAPSULE ORAL at 13:45

## 2017-08-12 RX ADMIN — Medication 480 MCG: at 10:28

## 2017-08-12 RX ADMIN — ACYCLOVIR 400 MG: 400 TABLET ORAL at 21:47

## 2017-08-12 RX ADMIN — CALCIUM CARBONATE 600 MG (1,500 MG)-VITAMIN D3 400 UNIT TABLET 2 TABLET: at 08:42

## 2017-08-12 RX ADMIN — ALLOPURINOL 300 MG: 300 TABLET ORAL at 08:42

## 2017-08-12 RX ADMIN — GABAPENTIN 200 MG: 100 CAPSULE ORAL at 08:42

## 2017-08-12 RX ADMIN — ACYCLOVIR 400 MG: 400 TABLET ORAL at 08:42

## 2017-08-12 RX ADMIN — ALUMINUM HYDROXIDE, MAGNESIUM HYDROXIDE, SIMETHICONE 10 ML: 400; 400; 40 SUSPENSION ORAL at 08:40

## 2017-08-12 RX ADMIN — DIGOXIN 125 MCG: 125 TABLET ORAL at 08:42

## 2017-08-12 RX ADMIN — ATENOLOL 25 MG: 25 TABLET ORAL at 08:42

## 2017-08-12 RX ADMIN — DIBASIC SODIUM PHOSPHATE, MONOBASIC POTASSIUM PHOSPHATE AND MONOBASIC SODIUM PHOSPHATE 250 MG: 852; 155; 130 TABLET ORAL at 08:42

## 2017-08-12 ASSESSMENT — ACTIVITIES OF DAILY LIVING (ADL)
FALL_HISTORY_WITHIN_LAST_SIX_MONTHS: NO
TRANSFERRING: 0-->INDEPENDENT
RETIRED_EATING: 0-->INDEPENDENT
COGNITION: 0 - NO COGNITION ISSUES REPORTED
TOILETING: 0-->INDEPENDENT
AMBULATION: 0-->INDEPENDENT
BATHING: 0-->INDEPENDENT
RETIRED_COMMUNICATION: 0-->UNDERSTANDS/COMMUNICATES WITHOUT DIFFICULTY
WHICH_OF_THE_ABOVE_FUNCTIONAL_RISKS_HAD_A_RECENT_ONSET_OR_CHANGE?: AMBULATION;TRANSFERRING
SWALLOWING: 0-->SWALLOWS FOODS/LIQUIDS WITHOUT DIFFICULTY
DRESS: 0-->INDEPENDENT

## 2017-08-12 NOTE — PLAN OF CARE
Problem: Goal Outcome Summary  Goal: Goal Outcome Summary  Outcome: No Change  Patient afebrile, VSS. Denies pain or other complaints. Good UO from brand. Left arm mepilex is clean, dry, intact. Will need Sodium Phos- ordered from pharmacy- and Potassium replacement. Awaiting [facility] blood bank. Plan to go to TCU today per S.W. Note. Continue to monitor and plan of care.    Problem: Infection, Risk/Actual (Adult)  Goal: Identify Related Risk Factors and Signs and Symptoms  Related risk factors and signs and symptoms are identified upon initiation of Human Response Clinical Practice Guideline (CPG)     08/12/17 0534   Infection, Risk/Actual   Infection, Risk/Actual: Related Risk Factors exposure to microbes;chronic illness/condition;prolonged hospitalization;medication effects;skin integrity impairment   Signs and Symptoms (Infection, Risk/Actual) lab value changes;weakness

## 2017-08-12 NOTE — PROGRESS NOTES
"   08/12/17 1549   Quick Adds   Type of Visit Initial PT Evaluation   Living Environment   Lives With spouse   Living Arrangements house   Home Accessibility stairs to enter home   Number of Stairs to Enter Home 3   Number of Stairs Within Home 0   Stair Railings at Home none   Transportation Available car;family or friend will provide   Living Environment Comment PT:  Pt has single floor home with 3 stairs to enter front or 2 steps to enter through garage.  Pt  does work, but is trying to work from home at least initally.  Pt reports friends in the area who can help also,   Self-Care   Dominant Hand right   Usual Activity Tolerance good   Current Activity Tolerance fair   Regular Exercise yes   Activity/Exercise Type walking   Exercise Amount/Frequency daily   Equipment Currently Used at Home walker, rolling  (Rollator)   Activity/Exercise/Self-Care Comment Pt reports walking daily prior to admission   Functional Level Prior   Ambulation 0-->independent   Transferring 0-->independent   Toileting 0-->independent   Cognition 0 - no cognition issues reported   Fall history within last six months no   Which of the above functional risks had a recent onset or change? ambulation;transferring;toileting   Prior Functional Level Comment PT:  Pt was Ind in all aspects of life prior to may incident.   General Information   Onset of Illness/Injury or Date of Surgery - Date 05/29/17   Referring Physician La Nena Wolf MD   Patient/Family Goals Statement \"To do stairs so I can get home\"   Pertinent History of Current Problem (include personal factors and/or comorbidities that impact the POC) From H&P:  Pt experienced a sudden cardiac arrest of unclear etiology May 29, 2017 requiring ECMO with post-arrest multiorgan failure including pancytopenia, shock liver, hypoxic respiratory failure, acute kidney injury, anoxic brain injury and critical illness myopathy in addition to ESBL UTI and aspiration pneumonia.  " She was admitted to rehab June 16-19, 2017 and was good rehab candidate however she was transferred back to acute hospital for fever/sepsis workup.  She was unfortunately then diagnosed with stage IV diffuse large B cell lymphoma and began chemotherapy treatments.  She had improved medically to the point where she was admitted back to the acute rehab unit on August 4, 2017 however she then experienced neutropenic fever and a platelet count of 3, so she was transferred back to [facility] on August 8th.   Precautions/Limitations immunosuppressed;other (see comments)  (Low platlet/Hg counts)   General Observations PT:  Pt is a delightful women who is verycomplient with all tasks during evaluation.   General Info Comments PT:  Pt current Hg is 8 and platelets are 17.  She just got a blood infusion today prior to readings.   Cognitive Status Examination   Orientation orientation to person, place and time   Level of Consciousness alert   Follows Commands and Answers Questions 100% of the time;able to follow multistep instructions   Personal Safety and Judgment intact   Memory intact   Pain Assessment   Patient Currently in Pain No   Integumentary/Edema   Integumentary/Edema no deficits were identifed   Posture    Posture Comments PT:  Pt is WFL for posture   Range of Motion (ROM)   ROM Quick Adds No deficits were identified   Strength   Strength Comments PT:  Pt tested with very weak 2/5 for glutes, 3/5 for hamstrings and 3+/5 for ankle DFs.  All other muslce groups test WFL.  Pt does demonstrate weak core muscles and limited functional endurance.   ARC Assessment Only   Acute Rehab FIM See FIM scores for Mobility/ADL Assessment   Balance   Balance Comments PT:  Pt demonstrates good static and dynamic sitting balance.  She demonstrates fair static standing balance and poor dynamic standing balance.    Sensory Examination   Sensory Perception Comments PT:  Pt notes numbness on R thumb and 2 adjoining fingers.   Coordination    Coordination no deficits were identified   Muscle Tone   Muscle Tone no deficits were identified   Modality Interventions   Planned Modality Interventions TENS;Cryotherapy   Planned Modality Interventions Comments PT:  PRN for pain management   General Therapy Interventions   Planned Therapy Interventions balance training;bed mobility training;gait training;manual therapy;neuromuscular re-education;ROM;strengthening;stretching;transfer training;risk factor education;home program guidelines;progressive activity/exercise   Clinical Impression   Criteria for Skilled Therapeutic Intervention yes, treatment indicated   PT Diagnosis Abnormality of gait and mobility; muscle weakness   Influenced by the following impairments decreased strength, decreased balance   Functional limitations due to impairments Independnet transfers, gait, and stairs   Clinical Presentation Evolving/Changing   Clinical Presentation Rationale PT:  Pt has complex history and continues to change as she has gone to hospital now 2 times from ARU due to complications.   Clinical Decision Making (Complexity) Moderate complexity   Therapy Frequency` 2 times/day   Predicted Duration of Therapy Intervention (days/wks) 10 days   Anticipated Discharge Disposition Home with Home Therapy   Risk & Benefits of therapy have been explained Yes   Patient, Family & other staff in agreement with plan of care Yes   Clinical Impression Comments PT:  Pt would benefit from skilled PT intervention in order to address aforementioned deficits.   Total Evaluation Time   Total Evaluation Time (Minutes) 25

## 2017-08-12 NOTE — PLAN OF CARE
Problem: Goal Outcome Summary  Goal: Goal Outcome Summary  Outcome: No Change  Afebrile, OVSS. A&Ox4. Up w/ walker, SBA. Denies pain, reports tenderness in left arm d/t extravasation wound and mouth d/t sores. Lt arm mepilex is C/D/I. Denies N/V. Appetite fair. Reports loose/soft stools (not watery) x 2. Denies nose bleed this shift. Worked w/ PT tonight. Plans to go to TCU tomorrow per SW note. Will continue to monitor per POC.        Problem: Infection, Risk/Actual (Adult)  Goal: Identify Related Risk Factors and Signs and Symptoms  Related risk factors and signs and symptoms are identified upon initiation of Human Response Clinical Practice Guideline (CPG)   Outcome: No Change    08/11/17 2202   Infection, Risk/Actual   Infection, Risk/Actual: Related Risk Factors exposure to microbes;prolonged hospitalization;skin integrity impairment   Signs and Symptoms (Infection, Risk/Actual) weakness;cultures positive       Goal: Infection Prevention/Resolution  Patient will demonstrate the desired outcomes by discharge/transition of care.   Outcome: No Change    08/11/17 2202   Infection, Risk/Actual (Adult)   Infection Prevention/Resolution making progress toward outcome

## 2017-08-12 NOTE — IP AVS SNAPSHOT
MRN:[id number]                      After Visit Summary   8/12/2017    Amelia Michel    MRN: [id number]           Thank you!     Thank you for choosing [facility] for your care. Our goal is always to provide you with excellent care. Hearing back from our patients is one way we can continue to improve our services. Please take a few minutes to complete the written survey that you may receive in the mail after you visit with us. Thank you!        Patient Information     Date Of Birth          1960        About your hospital stay     You were admitted on:  August 12, 2017 You last received care in the:  [facility]    You were discharged on:  August 19, 2017        Reason for your hospital stay       Rehab after acute hospital stay to regain function so as to go home safely.                  Who to Call     For medical emergencies, please call 911.  For non-urgent questions about your medical care, please call your primary care provider or clinic, [phone]          Attending Provider     Provider Specialty    Regino Josue MD Physical Medicine and Rehab    Mary Babcock MD Physical Medicine and Rehab       Primary Care Provider Office Phone # Fax #    Comfort Sabillon [phone]      After Care Instructions     Activity       Up with walker.            Diet       Follow this diet upon discharge: Orders Placed This Encounter      Room Service      Regular Diet Adult            Wound care and dressings       Left upper arm wounds every other day: Clean wound and skin around wound with microklenz.  Paint Cavilon no sting barrier to skin around wound.  Apply Iodosorb gel nickel thick to areas with slough.  Tuck minimal amount of isodosrob gel to undermined area on middle wound with Q-tip. Cover with two mepilex border dressings.                  Follow-up Appointments     Adult [facility] Follow-up and recommended labs and tests       Follow up with oncology as  scheduled  8/21 and 8/24 (to start chemo).   Follow up with cardiology as scheduled 9/27  Follow up with rheumatology as scheduled 9/15.  Recommend Urology follow up ongoing frequency and incontinence for urodynamic studies.  Appointments on [place] and/or [facility] (with [facility] or [facility] provider or service). Call [phone] if you haven't heard regarding these appointments within 7 days of discharge.                  Your next 10 appointments already scheduled     Aug 21, 2017  4:45 PM CDT   [facility] Lab Draw with [facility] DRAW   [facility] Lab Draw ([facility])    [address]  [address] [facility] [address]   [phone]            Aug 21, 2017  5:40 PM CDT   (Arrive by 5:25 PM)   Lumbar Puncture with Kalpana Alvarado PA-C   [facility] ([facility])    [address] [place] [address]  [address] [facility] [address]   [phone]            Aug 24, 2017  7:00 AM CDT   [facility] Lab Draw with [facility] DRAW   [facility] Lab Draw ([facility])    [address]  [address] [facility] [address]   [phone]            Aug 24, 2017  7:30 AM CDT   Infusion 360 with [facility] ONCOLOGY INFUSION, UC 13 ATC   [facility] ([facility])    [address]  [address] [facility] [address]   [phone]            Sep 15, 2017 10:00 AM CDT   (Arrive by 9:45 AM)   New Patient Visit with Pj Cunha MD   [facility] Rheumatology ([facility])    [address] [place] [address]  [address] [facility] [address]   [phone]            Sep 27, 2017  1:00 PM CDT   (Arrive by 12:45 PM)   Implanted Defibulator with Uc Cv Device 1   [facility] Heart Care ([facility])    [address] [place] [address]  [address] [facility] [address]   [phone]            Sep 27, 2017  1:30 PM CDT   (Arrive by 1:15 PM)    RETURN ARRHYTHMIA with Juan Eaton MD   [facility] ([facility])    [address]  3rd Floor  [facility] [address] [phone]              Additional Services     Home Care PT Referral for Hospital Discharge       PT to eval and treat    Your provider has ordered home care - physical therapy. If you have not been contacted within 2 days of your discharge please call the department phone number listed on the top of this document.            Home care nursing referral       RN skilled nursing visit. RN to assess vital signs and weight, respiratory and cardiac status, patients ability to take and record daily blood pressure, temp and weight, pain level and activity tolerance, incision for signs/symptoms of infection, hydration, nutrition and bowel status and home safety.  RN to provide tube site care and management and line care per agency protocol.    Your provider has ordered home care nursing services. If you have not been contacted within 2 days of your discharge please call the inpatient department phone number at [phone] .            Home infusion referral       Your provider has referred you to: [facility]: [facility] [place] [phone]   [email or web address]      Anticipated Length of Therapy: 8/20-8/23    Home Infusion Pharmacist to adjust therapy based on labs and clinical assessments: No (Home Infusion will call for order)    Labs:  May draw labs from Venous Catheter: Yes  Home Infusion Pharmacist to order labs based on therapy type and clinical assessments: Yes  Call/Fax Lab Results to: Primary Care Physician and to Oncologist    Agency Staff to assess nursing needs for Infusion Therapy.    Access Device Management:  IV Access Type: PICC  Flush with Heparin and Normal Saline IVP PRN and routine site care (per agency protocol) to maintain access device? Yes                  Further instructions from  "your care team       Follow up  As scheduled with Oncology 8/21, 8/24  You are scheduled to see Dr. Christiano PA-C on Monday, August 21 at 5:40 pm  You are scheduled with Oncology on Thursday, August 24th at 7:30 am    Phone: [phone]  Address: [address] [place] [address]      Cardiology 9/27  You are scheduled to see Dr. Eaton on September 27th at 1:30 pm    Phone: [phone]  Address: [address] [place] [address]    Rheumatology 9/15  You are scheduled to see Dr. Cunha on September 15th at 10 am    Address  [address] [place] [address]  Phone   [phone]    Urology- follow up urinary incontinence/frequency with recommended urodynamic studies.  (referral placed)    HOME CARE  [facility] [phone] will provide RN, PT, OT. They will call you after you discharge to schedule the first visit.       Pending Results     No orders found from 8/10/2017 to 8/13/2017.            Statement of Approval     Ordered          08/19/17 0958  I have reviewed and agree with all the recommendations and orders detailed in this document.  EFFECTIVE NOW     Approved and electronically signed by:  Taz Ray MD             Admission Information     Date & Time Provider Department Dept. Phone    8/12/2017 Mary Babcock MD  ACUTE REHAB [phone]      Your Vitals Were     Blood Pressure Pulse Temperature Respirations Height Weight    156/65 65 96.4  F (35.8  C) (Oral) 16 1.473 m (4' 10\") 54.5 kg (120 lb 3.2 oz)    Pulse Oximetry BMI (Body Mass Index)                98% 25.12 kg/m2          MyChart Information     [facility] gives you secure access to your electronic health record. If you see a primary care provider, you can also send messages to your care team and make appointments. If you have questions, please call your primary care clinic.  If you do not have a primary care provider, please call [phone] and they will assist you.        Care EveryWhere ID     " This is your Care EveryWhere ID. This could be used by other organizations to access your [facility] medical records  [id number]        Equal Access to Services     CATINA HEAD : Hadii laurita Flores, jaironalana meyerlettyha, krystle nickdelbert bae, durga ednain hayaaall juárezja louise ladariusall judie So [facility] [phone].    ATENCIÓN: Si habla español, tiene a sarmiento disposición servicios gratuitos de asistencia lingüística. Llame al [phone].    We comply with applicable federal civil rights laws and [place] laws. We do not discriminate on the basis of race, color, national origin, age, disability sex, sexual orientation or gender identity.               Review of your medicines      START taking        Dose / Directions    calcium-vitamin D 600-400 MG-UNIT per tablet   Commonly known as:  CALTRATE   Used for:  Diffuse large B-cell lymphoma, unspecified body region (H)   Replaces:  Calcium Carb-Cholecalciferol 600-800 MG-UNIT Tabs        Dose:  2 tablet   Take 2 tablets by mouth every morning   Quantity:  60 tablet   Refills:  0       lactobacillus rhamnosus (GG) capsule        Dose:  1 capsule   Take 1 capsule by mouth 2 times daily   Quantity:  60 capsule   Refills:  0       triamcinolone 0.5 % Oint ointment   Commonly known as:  KENALOG   Used for:  Allergic reaction, subsequent encounter        Dose:  1 g   Apply 1 g topically 3 times daily   Quantity:  30 g   Refills:  0         CONTINUE these medicines which have NOT CHANGED        Dose / Directions    acetaminophen 325 MG tablet   Commonly known as:  TYLENOL   Used for:  Rheumatoid arthritis involving multiple sites with positive rheumatoid factor (H)        Dose:  650 mg   Take 2 tablets (650 mg) by mouth every 4 hours as needed for mild pain   Quantity:  100 tablet   Refills:  0       acyclovir 400 MG tablet   Commonly known as:  ZOVIRAX   Indication:  Treatment to Prevent Cytomegalovirus Disease   Used for:  Diffuse large B-cell lymphoma, unspecified body  region (H)        Dose:  400 mg   Take 1 tablet (400 mg) by mouth 2 times daily   Quantity:  60 tablet   Refills:  0       allopurinol 300 MG tablet   Commonly known as:  ZYLOPRIM   Used for:  Diffuse large B-cell lymphoma, unspecified body region (H)        Dose:  300 mg   Take 1 tablet (300 mg) by mouth daily   Quantity:  30 tablet   Refills:  0       ascorbic acid 500 MG Tabs   Used for:  Diffuse large B-cell lymphoma, unspecified body region (H)        Dose:  500 mg   Take 1 tablet (500 mg) by mouth daily   Quantity:  30 tablet   Refills:  0       atenolol 25 MG tablet   Commonly known as:  TENORMIN   Used for:  Atrial tachycardia (H)        Dose:  25 mg   Take 1 tablet (25 mg) by mouth 2 times daily   Quantity:  60 tablet   Refills:  0       cefTRIAXone 1 GM vial   Commonly known as:  ROCEPHIN   Used for:  Gram-positive bacteremia        Dose:  1000 mg   Start taking on:  8/20/2017   Inject 1 g (1,000 mg) into the muscle daily for 3 days   Quantity:  30 mL   Refills:  0       cetirizine 10 MG tablet   Commonly known as:  zyrTEC   Used for:  Allergic reaction, subsequent encounter        Dose:  10 mg   Take 1 tablet (10 mg) by mouth daily as needed for allergies   Quantity:  30 tablet   Refills:  0       digoxin 125 MCG tablet   Commonly known as:  LANOXIN   Used for:  Atrial tachycardia (H)        Dose:  125 mcg   Take 1 tablet (125 mcg) by mouth daily   Quantity:  30 tablet   Refills:  0       fluconazole 200 MG tablet   Commonly known as:  DIFLUCAN   Indication:  lymphoma, starting chemotherapy, neutropenic   Used for:  Diffuse large B-cell lymphoma, unspecified body region (H)        Dose:  200 mg   Take 1 tablet (200 mg) by mouth daily   Quantity:  30 tablet   Refills:  0       folic acid 1 MG tablet   Commonly known as:  FOLVITE   Used for:  Diffuse large B-cell lymphoma, unspecified body region (H)        Dose:  1 mg   Take 1 tablet (1 mg) by mouth daily   Quantity:  30 tablet   Refills:  0        gabapentin 100 MG capsule   Commonly known as:  NEURONTIN   Used for:  Critical illness myopathy        Dose:  200 mg   Take 2 capsules (200 mg) by mouth 3 times daily   Quantity:  120 capsule   Refills:  0       levofloxacin 250 MG tablet   Commonly known as:  LEVAQUIN   Indication:  prophylaxis. DLBCL s/p chemo.   Used for:  Diffuse large B-cell lymphoma, unspecified body region (H)        Dose:  250 mg   Start taking on:  8/23/2017   Take 1 tablet (250 mg) by mouth daily   Quantity:  30 tablet   Refills:  0       lidocaine visc 2% & diphenhydramine 12.5mg/5mL & maalox/mylanta w/simethicone (1:1:1 v/v/v) Susp compounding kit   Used for:  Diffuse large B-cell lymphoma, unspecified body region (H)        Dose:  10 mL   Swish and spit 10 mLs in mouth 4 times daily (before meals and nightly)   Quantity:  500 mL   Refills:  0       multivitamin, therapeutic with minerals Tabs tablet   Used for:  Diffuse large B-cell lymphoma, unspecified body region (H)        Dose:  1 tablet   Take 1 tablet by mouth daily   Quantity:  30 each   Refills:  0       potassium chloride SA 20 MEQ CR tablet   Commonly known as:  K-DUR/KLOR-CON M   Used for:  Diffuse large B-cell lymphoma, unspecified body region (H)        Dose:  20 mEq   Take 1 tablet (20 mEq) by mouth daily   Quantity:  90 tablet   Refills:  0       predniSONE 5 MG tablet   Commonly known as:  DELTASONE   Used for:  Diffuse large B-cell lymphoma, unspecified body region (H)        Dose:  5 mg   Take 1 tablet (5 mg) by mouth daily   Quantity:  30 tablet   Refills:  0       thiamine 50 MG Tabs   Used for:  Diffuse large B-cell lymphoma, unspecified body region (H)        Dose:  50 mg   Take 1 tablet (50 mg) by mouth daily   Quantity:  30 tablet   Refills:  0       traMADol 50 MG tablet   Commonly known as:  ULTRAM   Used for:  Diffuse large B-cell lymphoma, unspecified body region (H)        Dose:  25 mg   Take 0.5 tablets (25 mg) by mouth every 6 hours as needed for  moderate pain   Quantity:  28 tablet   Refills:  0         STOP taking     Calcium Carb-Cholecalciferol 600-800 MG-UNIT Tabs   Replaced by:  calcium-vitamin D 600-400 MG-UNIT per tablet           filgrastim 15 mcg/mL (in Dextrose) 15 mcg/ml   Commonly known as:  NEUPOGEN           melatonin 1 MG Tabs tablet           senna-docusate 8.6-50 MG per tablet   Commonly known as:  SENOKOT-S;PERICOLACE                Where to get your medicines      These medications were sent to [facility] [place], [place] - [address]  [address] [address]     Phone:  [phone]     acyclovir 400 MG tablet    allopurinol 300 MG tablet    ascorbic acid 500 MG Tabs    atenolol 25 MG tablet    calcium-vitamin D 600-400 MG-UNIT per tablet    cetirizine 10 MG tablet    digoxin 125 MCG tablet    fluconazole 200 MG tablet    folic acid 1 MG tablet    gabapentin 100 MG capsule    levofloxacin 250 MG tablet    lidocaine visc 2% & diphenhydramine 12.5mg/5mL & maalox/mylanta w/simethicone (1:1:1 v/v/v) Susp compounding kit    multivitamin, therapeutic with minerals Tabs tablet    potassium chloride SA 20 MEQ CR tablet    predniSONE 5 MG tablet    thiamine 50 MG Tabs    triamcinolone 0.5 % Oint ointment         Some of these will need a paper prescription and others can be bought over the counter. Ask your nurse if you have questions.     Bring a paper prescription for each of these medications     cefTRIAXone 1 GM vial    lactobacillus rhamnosus (GG) capsule    traMADol 50 MG tablet                Protect others around you: Learn how to safely use, store and throw away your medicines at www.disposemymeds.org.             Medication List: This is a list of all your medications and when to take them. Check marks below indicate your daily home schedule. Keep this list as a reference.      Medications           Morning Afternoon Evening Bedtime As Needed    acetaminophen 325 MG tablet   Commonly known as:   TYLENOL   Take 2 tablets (650 mg) by mouth every 4 hours as needed for mild pain                                acyclovir 400 MG tablet   Commonly known as:  ZOVIRAX   Take 1 tablet (400 mg) by mouth 2 times daily   Last time this was given:  400 mg on 8/19/2017  8:17 AM                                allopurinol 300 MG tablet   Commonly known as:  ZYLOPRIM   Take 1 tablet (300 mg) by mouth daily   Last time this was given:  300 mg on 8/19/2017  8:18 AM                                ascorbic acid 500 MG Tabs   Take 1 tablet (500 mg) by mouth daily   Last time this was given:  500 mg on 8/19/2017  8:17 AM                                atenolol 25 MG tablet   Commonly known as:  TENORMIN   Take 1 tablet (25 mg) by mouth 2 times daily   Last time this was given:  25 mg on 8/19/2017  8:19 AM                                calcium-vitamin D 600-400 MG-UNIT per tablet   Commonly known as:  CALTRATE   Take 2 tablets by mouth every morning   Last time this was given:  2 tablets on 8/19/2017  8:17 AM                                cefTRIAXone 1 GM vial   Commonly known as:  ROCEPHIN   Inject 1 g (1,000 mg) into the muscle daily for 3 days   Start taking on:  8/20/2017   Last time this was given:  1 g on 8/19/2017  8:19 AM                                cetirizine 10 MG tablet   Commonly known as:  zyrTEC   Take 1 tablet (10 mg) by mouth daily as needed for allergies   Last time this was given:  10 mg on 8/19/2017  8:18 AM                                digoxin 125 MCG tablet   Commonly known as:  LANOXIN   Take 1 tablet (125 mcg) by mouth daily   Last time this was given:  125 mcg on 8/19/2017  8:18 AM                                fluconazole 200 MG tablet   Commonly known as:  DIFLUCAN   Take 1 tablet (200 mg) by mouth daily   Last time this was given:  200 mg on 8/19/2017  8:18 AM                                folic acid 1 MG tablet   Commonly known as:  FOLVITE   Take 1 tablet (1 mg) by mouth daily   Last time this  was given:  1 mg on 8/19/2017  8:18 AM                                gabapentin 100 MG capsule   Commonly known as:  NEURONTIN   Take 2 capsules (200 mg) by mouth 3 times daily   Last time this was given:  200 mg on 8/19/2017  8:17 AM                                lactobacillus rhamnosus (GG) capsule   Take 1 capsule by mouth 2 times daily   Last time this was given:  1 capsule on 8/19/2017  8:17 AM                                levofloxacin 250 MG tablet   Commonly known as:  LEVAQUIN   Take 1 tablet (250 mg) by mouth daily   Start taking on:  8/23/2017                                lidocaine visc 2% & diphenhydramine 12.5mg/5mL & maalox/mylanta w/simethicone (1:1:1 v/v/v) Susp compounding kit   Swish and spit 10 mLs in mouth 4 times daily (before meals and nightly)   Last time this was given:  10 mLs on 8/18/2017  4:28 PM                                multivitamin, therapeutic with minerals Tabs tablet   Take 1 tablet by mouth daily   Last time this was given:  1 tablet on 8/19/2017  8:18 AM                                potassium chloride SA 20 MEQ CR tablet   Commonly known as:  K-DUR/KLOR-CON M   Take 1 tablet (20 mEq) by mouth daily   Last time this was given:  20 mEq on 8/19/2017  8:18 AM                                predniSONE 5 MG tablet   Commonly known as:  DELTASONE   Take 1 tablet (5 mg) by mouth daily   Last time this was given:  5 mg on 8/19/2017  8:18 AM                                thiamine 50 MG Tabs   Take 1 tablet (50 mg) by mouth daily   Last time this was given:  50 mg on 8/19/2017  8:18 AM                                traMADol 50 MG tablet   Commonly known as:  ULTRAM   Take 0.5 tablets (25 mg) by mouth every 6 hours as needed for moderate pain                                triamcinolone 0.5 % Oint ointment   Commonly known as:  KENALOG   Apply 1 g topically 3 times daily   Last time this was given:  8/19/2017  8:19 AM

## 2017-08-12 NOTE — PLAN OF CARE
Problem: Goal Outcome Summary  Goal: Goal Outcome Summary  PT 5C: Pt transfers with SBA. Ambulated 200 ft x 2 with FWW, CGA-SBA. Limited by MONTALVO and LE fatigue. Pt expressed frustration that she was not notified of discharge time for ARU today. Discussed plan to complete proximal exercises this PM at ARU for strengthening.     Recommend: ARU     Physical Therapy Discharge Summary     Reason for therapy discharge:    Discharged to acute rehabilitation facility.     Progress towards therapy goal(s). See goals on Care Plan in [facility] electronic health record for goal details.  Goals not met.  Barriers to achieving goals:   discharge from facility.     Therapy recommendation(s):    Continued therapy is recommended.  Rationale/Recommendations:  ARU to progress strength, balance and independence with functional mobility. .

## 2017-08-12 NOTE — DISCHARGE SUMMARY
[facility], [place]    Discharge Summary  Hematology / Oncology    Date of Admission:  8/8/2017  Date of Discharge:  08/12/2017  Discharging Provider: Shyann Marinelli  Date of Service (when I saw the patient): 08/12/2017    Discharge Diagnoses   Strep mitis bacteremia  Parotitis  Mucositis  A fib/flutter  DLBCL  Pancytopenia 2/2 chemo  Hyperuricemia  RA  LFT derrangement    History of Present Illness   ### Adopted from H&P ###    56 year old female with DLBCL, just completed R-EPOCH last week and was transferred to ARU. She developed a low grade fever yesterday and blood cultures were done, one peripherally and one from [facility]. Both grew GPCs in chains today.   She noticed some swelling in upper neck on both sides yesterday and continues to have it today. Has mucositis and sore throat. Denies any cough, SOB, CP, abdominal pain, NVD. She has brand catheter. Edema is much better. No headache. Denies any other symptoms on full ROS.    Hospital Course   Amelia Michel is a 57 y/o female with PMH of RA, cardiac arrest, extravasation LUE wound, A fib/Flutter (not on anticoagulation r/t TCP), and DLBCL (now s/p 1 cycle R-EPOCH D1=7/28/17) who transferred from ARU on 8/8/2017 2/2 to low grade fever, GPC bacteremia (strep mitis), & parotitis (w/o abscess on CT). The following problems were addressed during her hospitalization:      ID  # Strep mitis-intermediate resistance bacteremia likely cause of neutropenic fever.  BCx + on 8/7/17 (positive in PICC & periphery). NTD on 8/8/17 thus far.  Other aspects of neutropenic fever work up completed include (UCx--has long term brand, NTD.  CXR showing R slide pleural eff & associated opasities improved but b/l periphilar opacities new infection vs. pulm edema, however patient asymptomatic). No grow on blood cultures from 8/8, 8/10, or 8/11 at time of discharge, cultures are on-going. Initially started on ertapenem based on positive blood culture and  sensitivities from 8/7, but as no previous recorded reaction with ceftriaxone will switch to ceftriaxone 1g q24h to complete total of 14 day course antibiotics (through 8/23). Resume Levaquin 250mg following completion of ceftriaxone.        # Parotitis, identified on clinical exam & also on CT (no evidence of abscess). PTA lasix 20 mg held r/t dehydration as possible cause. Mumps PCR negative. Symptomatically patient is showing improvements in pain & swelling.       # Mucositis.  L lateral tongue, lower gum line, R upper buccal mucosa 2/2 neutropenia from recent chemotherapy. Continue MMW.     CV   # A fib/flutter.  Rate controlled. Tachycardic overnight on 8/9. s/p 20 mg diltiazem. Patient was asymptomatic & HD stable during episode. Atenolol dose increased. Continue PTA dig/atenolol (at increased BID dose).  No anticoagulation 2/2 to thrombocytopenia.     # OSH cardiac arrest. 5/29/17.  Etiology unclear possibly r/t AV prieto blocking agents.  Meets critieria for secondary prevention ICD but placement is on hold r/t LUE extravasation wound & tx. Of lymphoma.  EP discussed risk vs. Benefit of Life Vest patient defer.  Plan for f/u in cardiology with [facility] in 2-3 mo.      # Anasarca/volume overload.  Improving, trace edema b/l LE on exam.  CXR maybe with pulmonary edema.  No crackles on exam. Hold PTA 20 mg IV lasix (r/t parotitis). Patient's weight and peripheral edema is now much improved. Ok to stop Lasix unless patient shows signs of fluid overload again.    # Aortic masses on BRE.  Not endocarditis.  Lymphoma (?).  Continue chemotherapy.     HEME  # Diffuse Large B Cell Lymphoma.  Stage IV with BM & liver involvement.  S/P cycle 1 DA-R-EPOCH D1 7/28/17 tolerated well however with Rituxan had SOB/hypotension.  Was started on daily neupogen (r/t ARU) 8/2.  At ARU was on PPX (levaquin, ACY, & Fluc).    - Liver biopsy showed DLBCL with non germinal center on FISH the atypical lymphoid population is positive for  CD20, Bcl6, MUM-1 and c-myc, and negative for CD3, CD10,CD5 and cyclin-D1. Background T cells are CD3 and CD5 positive. EBV negative.    - Patient will switch to [facility] for next cycle as not a [facility] (per Jennifer/Susana).      # Pancytopenia 2/2 to chemotherapy & underlying DLBCL with BM involvement. Maintained on transfusion parameters to keep Hgb > 8 and PLT > 10.     # Hyperuricemia.  Continue allopurinol.     RHEUM  # Rheumatoid arthritis. History of seropositive rheumatoid arthritis (anti-CCP positive) since late 1980;s w/ multiple MTP osteotomies, partial right wrist fusion, longstanding therapy consisting of MTX, prednisone and HCQ. Continue 5 mg PO prednisone. Follow-up with [facility] Dr. Conor Cunha in 4-6 weeks after discharge     DERM   # LUE extravasation wound 2/2 OSH cardiac arrest.  Continue PTA wound cares (from ARU & re consult our WOC team, no expanding erythema to explain cause of neutropenic fever. Slowly improving per patient report. Dressed every other day by wound care at ARU. WOC RN recommending switching to idosorb for wound care. Reapply based on color change of idosorb, start with every other day for now.      NEURO    # H/O saddle anethesia/urinary incontinence.  Urodynamic study outpatient. Continue brand, urine Cx shows <10K colonies of VRE.     ENDO   # Steroid induced hyperglycemia.  Resolved (only on 5 of pred for RA).     GI   # LFT derangement 2/2 to lymphoma involvement of liver. Expect LFTs to ultimately improve with treatment, but may fluctuate in the meantime.    Shyann Marinelli PA-C  Hematology/Oncology  Pager: [phone]    Code Status   Full Code    Primary Care Physician   Comfort Sabillon    Vital Signs with Ranges  Temp:  [97.5  F (36.4  C)-98  F (36.7  C)] 97.5  F (36.4  C)  Heart Rate:  [68-79] 79  Resp:  [16-18] 16  BP: (125-147)/(62-82) 147/82  SpO2:  [96 %-99 %] 96 %  123 lbs 8 oz    Physical Exam   Constitutional: Pleasant and cooperative female  seen up as emelia in room. Awake, alert, no apparent distress.  HEENT: NC/AT, EOMI, sclera clear, conjunctiva normal, oral pharynx with moist mucus membranes  Respiratory: No increased work of breathing, CTAB, no crackles or wheezing  Cardiovascular: RRR, normal S1 and S2, no murmur noted and no edema  Skin: No bruising, bleeding, rashes, or lesions  Neurologic:  Answers questions appropriately. Moves all extremities spontaneously.  Neuropsychiatric: Calm, appropriate affect    Discharge Disposition   Discharged to home  Condition at discharge: Good    Consultations This Hospital Stay   PHARMACY TO DOSE VANCO  WOUND OSTOMY CONTINENCE NURSE  IP CONSULT  OCCUPATIONAL THERAPY ADULT IP CONSULT  PHYSICAL THERAPY ADULT IP CONSULT  PHYSICAL THERAPY ADULT IP CONSULT  OCCUPATIONAL THERAPY ADULT IP CONSULT    Discharge Orders     General info for SNF   Length of Stay Estimate: Short Term Care: Estimated # of Days <30  Condition at Discharge: Improving  Level of care:skilled   Rehabilitation Potential: Good  Admission H&P remains valid and up-to-date: Yes  Recent Chemotherapy: Date: 7/28                       Use Nursing Home Standing Orders: Yes     Mantoux instructions   Give two-step Mantoux (PPD) Per Facility Policy Yes     Reason for your hospital stay   You were here with Streptococcus mitis infection in your blood and parotitis. You were treated with antibiotics.     Wound care (specify)   Site:   RUE  Instructions: Apply Idosorb to wounds every other day     Follow Up and recommended labs and tests   Scheduled appointments as below.     Activity - Up ad emelia     Full Code     Physical Therapy Adult Consult   Evaluate and treat as clinically indicated.    Reason:  deconditioning     Occupational Therapy Adult Consult   Evaluate and treat as clinically indicated.    Reason:  deconditioning     Contact Isolation     Advance Diet as Tolerated   Follow this diet upon discharge: Regular       Discharge Medications   Current  Discharge Medication List      START taking these medications    Details   cefTRIAXone (ROCEPHIN) 1 GM vial Inject 1 g (1,000 mg) into the muscle daily for 11 days  Qty: 10 mL, Refills: 0    Associated Diagnoses: Gram-positive bacteremia         CONTINUE these medications which have CHANGED    Details   acetaminophen (TYLENOL) 325 MG tablet Take 2 tablets (650 mg) by mouth every 4 hours as needed for mild pain  Qty: 100 tablet    Associated Diagnoses: Rheumatoid arthritis involving multiple sites with positive rheumatoid factor (H)      acyclovir (ZOVIRAX) 400 MG tablet Take 1 tablet (400 mg) by mouth 2 times daily  Qty: 30 tablet    Associated Diagnoses: Diffuse large B-cell lymphoma of extranodal site (H)      allopurinol (ZYLOPRIM) 300 MG tablet Take 1 tablet (300 mg) by mouth daily  Qty: 30 tablet    Associated Diagnoses: Diffuse large B-cell lymphoma of extranodal site (H)      ascorbic acid 500 MG TABS Take 1 tablet (500 mg) by mouth daily    Associated Diagnoses: Vitamin deficiency      atenolol (TENORMIN) 25 MG tablet Take 1 tablet (25 mg) by mouth 2 times daily    Associated Diagnoses: Paroxysmal atrial fibrillation (H); Atrial tachycardia (H); Other secondary hypertension      Calcium Carb-Cholecalciferol 600-800 MG-UNIT TABS Take 2 tablets by mouth every morning    Associated Diagnoses: Hypocalcemia      cetirizine (ZYRTEC) 10 MG tablet Take 1 tablet (10 mg) by mouth daily as needed for allergies  Qty: 30 tablet    Associated Diagnoses: Seasonal allergic rhinitis, unspecified chronicity, unspecified trigger      digoxin (LANOXIN) 125 MCG tablet Take 1 tablet (125 mcg) by mouth daily    Associated Diagnoses: Atrial tachycardia (H); Paroxysmal atrial fibrillation (H)      filgrastim 15 mcg/mL, in Dextrose, (NEUPOGEN) 15 mcg/ml Inject 32 mLs (480 mcg) into the vein daily    Associated Diagnoses: Diffuse large B-cell lymphoma of extranodal site (H)      fluconazole (DIFLUCAN) 200 MG tablet Take 1 tablet (200  mg) by mouth daily  Qty: 30 tablet    Associated Diagnoses: Diffuse large B-cell lymphoma of extranodal site (H)      folic acid (FOLVITE) 1 MG tablet Take 1 tablet (1 mg) by mouth daily  Qty: 30 tablet    Associated Diagnoses: Folic acid deficiency      gabapentin (NEURONTIN) 100 MG capsule Take 2 capsules (200 mg) by mouth 3 times daily  Qty: 180 capsule, Refills: 3    Associated Diagnoses: Mononeuropathy      !! insulin aspart (NOVOLOG PEN) 100 UNIT/ML injection Inject 1-5 Units Subcutaneous At Bedtime    Associated Diagnoses: Hyperglycemia      !! insulin aspart (NOVOLOG PEN) 100 UNIT/ML injection Inject 1-7 Units Subcutaneous 3 times daily (before meals)    Associated Diagnoses: Hyperglycemia      magic mouthwash suspension (diphenhydramine, lidocaine, aluminum-magnesium & simethicone) Swish and spit 10 mLs in mouth 4 times daily (before meals and nightly)    Associated Diagnoses: Diffuse large B-cell lymphoma of extranodal site (H)      melatonin 1 MG TABS tablet Take 1-3 tablets (1-3 mg) by mouth nightly as needed for sleep    Associated Diagnoses: Insomnia, unspecified      morphine 0.1% in solosite topical gel Place 2 g onto the skin 3 times daily as needed  Refills: 0    Associated Diagnoses: Arm pain, left      multivitamin, therapeutic with minerals (THERA-VIT-M) TABS tablet Take 1 tablet by mouth daily  Qty: 30 each, Refills: 0    Associated Diagnoses: Deficiency of nutrient element, unspecified      predniSONE (DELTASONE) 5 MG tablet Take 1 tablet (5 mg) by mouth daily    Associated Diagnoses: Cardiogenic shock (H); Rheumatoid arthritis involving multiple sites with positive rheumatoid factor (H)      senna-docusate (SENOKOT-S;PERICOLACE) 8.6-50 MG per tablet Take 1-2 tablets by mouth 2 times daily  Qty: 100 tablet    Associated Diagnoses: Constipation, unspecified constipation type      thiamine 50 MG TABS Take 1 tablet (50 mg) by mouth daily  Qty: 30 tablet    Associated Diagnoses: Deficiency of  nutrient element, unspecified      traMADol (ULTRAM) 50 MG tablet Take 0.5 tablets (25 mg) by mouth every 6 hours as needed for moderate pain  Qty: 28 tablet    Associated Diagnoses: Arm pain, left      levofloxacin (LEVAQUIN) 250 MG tablet Take 1 tablet (250 mg) by mouth daily  Refills: 0    Associated Diagnoses: Diffuse large B-cell lymphoma of extranodal site (H)      potassium chloride SA (K-DUR/KLOR-CON M) 20 MEQ CR tablet Take 1 tablet (20 mEq) by mouth daily  Qty: 90 tablet    Associated Diagnoses: Deficiency of nutrient element, unspecified       !! - Potential duplicate medications found. Please discuss with provider.      STOP taking these medications       meropenem (MERREM) 1 G vial Comments:   Reason for Stopping:             Allergies   Allergies   Allergen Reactions     Metoprolol Other (See Comments)     Pt and  report that metoprolol does not work for her and also reports feeling unwell with this medication. She has been able to tolerate atenolol, which as worked in controlling her HR.      No Clinical Screening - See Comments      Penicillin Allergy Skin Test not performed, see antimicrobial management team progress note 7/5/17.       Penicillins      Tape [Adhesive Tape] Rash       Data   Most Recent 3 CBC's:  Recent Labs   Lab Test  08/12/17   0403  08/11/17   0403  08/10/17   0316   WBC  1.2*  0.5*  0.2*   HGB  8.0*  8.1*  8.9*   MCV  90  88  88   PLT  17*  17*  24*      Most Recent 3 BMP's:  Recent Labs   Lab Test  08/12/17   0403  08/11/17   0403  08/10/17   0316   NA  138  137  136   POTASSIUM  3.9  4.0  3.8   CHLORIDE  108  106  105   CO2  22  24  24   BUN  17  20  18   CR  0.42*  0.44*  0.41*   ANIONGAP  8  7  8   VIKTORIYA  9.0  8.8  9.2   GLC  71  74  86     Most Recent 2 LFT's:  Recent Labs   Lab Test  08/12/17   0403  08/11/17   0403   AST  64*  40   ALT  103*  72*   ALKPHOS  169*  154*   BILITOTAL  0.7  0.7     Most Recent 6 Bacteria Isolates From Any Culture (See EPIC Reports for  Culture Details):  Recent Labs   Lab Test  08/12/17   0322  08/11/17   0402  08/10/17   0802  08/08/17   1832  08/08/17   0945  08/07/17   2218   CULT  No growth after 3 hours  No growth after 1 day  No growth after 2 days  No growth after 4 days  No growth after 4 days  <10,000 colonies/mL Enterococcus faecium (VRE)  Critical Value/Significant Value called to and read back by Megan Dressler, RN   6747 8/10/17. MS  *  Cultured on the 1st day of incubation: Streptococcus mitis  Critical Value/Significant Value, preliminary result only, called to and read   back by Marielena Kovacs RN [facility] at 1405 on 8/8/17 by JEFFERY.  (Note)  POSITIVE for STREPTOCOCCUS PNEUMONIAE by Verigene multiplex nucleic  acid test. Note: Streptococcus mitis group is known to cross react  with S. pneumoniae. Correlation with culture results and clinical  presentation is necessary. Final identification and antimicrobial  susceptibility testing will be verified by standard methods.    Specimen tested with Verigene multiplex, gram-positive blood culture  nucleic acid test for the following targets: Staph aureus, Staph  epidermidis, Staph lugdunensis, other Staph species, Enterococcus  faecalis, Enterococcus faecium, Streptococcus species, S. agalactiae,  S. anginosus grp., S. pneumoniae, S. pyogenes, Listeria sp., mecA  (methicillin resistance) and Se/B (vancomycin resistance).    Critical Value/Significant Value called to and read wes k by ZAID REINOSO RN @1622 8/8/17. CT  *

## 2017-08-12 NOTE — IP AVS SNAPSHOT
[facility]    [address] [place] [id number]    Phone:  [phone]                                       After Visit Summary   8/12/2017    Amelia Michel    MRN: [id number]           After Visit Summary Signature Page     I have received my discharge instructions, and my questions have been answered. I have discussed any challenges I see with this plan with the nurse or doctor.    ..........................................................................................................................................  Patient/Patient Representative Signature      ..........................................................................................................................................  Patient Representative Print Name and Relationship to Patient    ..................................................               ................................................  Date                                            Time    ..........................................................................................................................................  Reviewed by Signature/Title    ...................................................              ..............................................  Date                                                            Time

## 2017-08-12 NOTE — PLAN OF CARE
Problem: Discharge Planning  Goal: Discharge Planning (Adult, OB, Behavioral, Peds)  Outcome: Adequate for Discharge Date Met:  08/12/17 08/12/17 2003   Discharge Planning   Patient/family verbalizes understanding of discharge plan recommendations? Yes   Medical Team notified of plan? yes   Does Patient Need a Referral for Clinic CC No   Discharge Needs Assessment   Readmission Within The Last 30 Days current reason for admission unrelated to previous admission   Current Health   Anticipated Changes Related to Illness none   Functional Level Prior   Transportation Available agency transportation   Vss. Mouth feels better and using magic mouthwash. Ate cheerios, 1 percent milk, hard boiled egg, pears and veggie eric. PICC dressing changed. Worked with PT. Left arm wound was changed by the [facility] nurse yesterday. Received GCSF and will continue for the next two to three days. Bath. Replaced K+ and Phos po. Discharged to TCU. [facility] came at 10:45 to transport patient to the [place] tcu.

## 2017-08-12 NOTE — H&P
[facility]  Admission Note - Resident  _______________________________________________________________________  Patient Name: Amelia Michel   YOB: 1960  MRN: [id number]     Age / Sex: 56 year old female    Admit Date: August 12, 2017    Admitted From: [facility]  ________________________________________________________________________    Reason for Admission: deconditioning following a prolonged complicated hospital course including treatment for s/p cardiac arrest, multiorgan failure and a new diagnosis of B cell lymphoma.     History of Present Illness:    Reason for Initial Hospitalization:  Amelia Michel is a 56 year old female who has been admitted previously to our ARU twice.  Please refer to previous H&Ps for full details however in summary, Ms. Michel has a past medical history of  rheumatoid arthritis, a-fib, VSD repair in 1969 and neuropathy of unknown etiology. She unfortunately experienced a sudden cardiac arrest of unclear etiology May 29, 2017 requiring ECMO with post-arrest multiorgan failure including pancytopenia, shock liver, hypoxic respiratory failure, acute kidney injury, anoxic brain injury and critical illness myopathy in addition to ESBL UTI and aspiration pneumonia.    She was admitted to rehab June 16-19, 2017 and was good rehab candidate however she was transferred back to acute hospital for fever/sepsis workup.  She was unfortunately then diagnosed with stage IV diffuse large B cell lymphoma and began chemotherapy treatments.  She had improved medically to the point where she was admitted back to the acute rehab unit on August 4, 2017 however she then experienced neutropenic fever and a platelet count of 3, so she was transferred back to [facility] on August 8th.    During her August 8th medical admission, she was found to have strep mitis intermediate resistance bacteremia as the likely cause of her neutropenic  fever, parotitis with no clear cause but possibly related to dehydration, and mucositis related chemotherapy.       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 She was independent with all ADLs and IADLs prior to May 29, 2017 cardiac arrest.     Currently:    PT: Pt transfers with SBA. Ambulated 200 ft x 2 with FWW, CGA-SBA. Limited by MONTALVO and LE fatigue.  Discussed plan to complete proximal exercises this PM at ARU for strengthening.     OT: Pt SBA in room and in whitaker ambulation w/ SBA ~150ft w/ walker, pt completed full shower w/ min A seated on shower bench    Amelia Michel is medically appropriate and is assessed to have needs and will benefit from an inpatient acute rehabilitation comprehensive program working with Physical and Occupational Therapist and will benefit from supervision and management of Rehab Nursing and Rehab MD.     Allergies  Allergies   Allergen Reactions     Metoprolol Other (See Comments)     Pt and  report that metoprolol does not work for her and also reports feeling unwell with this medication. She has been able to tolerate atenolol, which as worked in controlling her HR.      No Clinical Screening - See Comments      Penicillin Allergy Skin Test not performed, see antimicrobial management team progress note 7/5/17.       Penicillins      Tape [Adhesive Tape] Rash       Past Medical and Surgical History  Past Medical History:   Diagnosis Date     Hypertension      Rheumatoid arthritis(714.0)      Past Surgical History:   Procedure Laterality Date     ANESTHESIA CARDIOVERSION N/A 5/17/2017    Procedure: ANESTHESIA CARDIOVERSION;  ANESTHESIA CARDIOVERSION;  Surgeon: GENERIC ANESTHESIA PROVIDER;  Location: [facility]     ARTHRODESIS WRIST  2000    Right wrist     BONE MARROW ASPIRATE &BIOPSY  7/12/2017          FOOT SURGERY      4 left and 2 right     RELEASE CARPAL TUNNEL BILATERAL       REPAIR VENTRICULAR SEPTAL DEFECT  1969       Family  "History  Family History   Problem Relation Age of Onset     Breast Cancer Mother      Hypertension Mother      Alzheimer Disease Mother      Hypertension Father      Blood Disease Father      Lymphoa     Circulatory Father      A Fib     DIABETES Paternal Grandmother        Social History    Please see previous H&Ps for additional details. Briefly, lives with her  Wolf in a house with 2 stairs to enter and 0 stairs within.  is a former respiratory therapist now involved in research per chart review; he works but can take time off as needed.     Amelia Michel was previously independent with ambulation without use of assistive devices, previously independent with upper and lower body self care, ADLs, and IADLs.     Family will be able to provide 24 hour supervision and assistance on discharge from the hospital.    Review of Systems  General: feels well, states she feel about the same as when she left ARU four days ago - hasn't really lost any progress in her opinion  Head: currently a 'tired headache' with a focus above her right eye which she states is a chronic issue for her  Eyes/Nose/Mouth/Throat: endorses some left lower gum line tenderness secondary to a chemo-related mouth ulcer; no change in vision, no runny nose, no sore throat or difficulty swallowing  Cardiovascular: No palpitations, no chest pain  Pulmonary: No cough or shortness of breath  Gastrointestinal: No nausea, vomiting, diarrhea or constipation, although she endorses \"mushy stool\" secondary to antibiotics.   Extremities: no joint pain, right wrist s/p fusion for RA many years ago, s/p multiple toe surgeries as well  Neurological: generalized weakness that is overall slowly improving, endorses ongoing numbness in left hand digits 1, 2 and 3 present since her resuscitation (and improved as it previously involved her forearm as well)  Skin: Left arm wound continues to heal, decreased swelling, No rash  Psychiatry: No mood changes, " "feels she is managing okay with her mood.     Physical Examination  Temp (P) 95.8  F (35.4  C) (Tympanic)  Resp (P) 16  Ht (P) 1.473 m (4' 10\")  Wt (P) 57.4 kg (126 lb 8 oz)  BMI (P) 26.44 kg/m2    GENERAL: alert and oriented, no acute distress, conversational and pleasant, lying inclined on bed in hospital room,  Wolf bedside  NEUROLOGIC:   - alert and oriented times three, no facial asymmetry, no tremor,   - strength: 4+/5 strength on the right and 4/5 strength on the left in tested  strength, finger abduction, and elbow flexion/extension; left side 4/5 wrist extension and right side not tested due to history of wrist fusion; 4/5 strength bilaterally in tested straight leg raise and plantar/dorsiflexion;    - sensation: sensation in tact bilaterally over upper and lower extremities EXCEPT for tingling in the left hand digits 1, 2 and 3.   HEENT: Normocephalic, atraumatic, gaze conjugate, wearing glasses.   CARDIOVASCULAR: regular rate and rhythm, extremities well perfused, lower limb compression stockings in place  PULMONARY/CHEST: speaking in full sentences, no cyanosis, no respiratory distress, breathing comfortably on room air, symmetrical chest wall expansion, no wheezes, no rales appreciated on auscultation.  ABDOMEN: Positive Bowel Sounds, non-distended, soft, non-tender, no rebound or guarding.  MUSCULOSKELATAL: moves all four extremities independently, extremities without deformities, edema.  SKIN: left antecubital fossa wound with clean dry dressing in place - dressing not removed for wound inspection however her  has a picture on his phone of her wound taken the day before - linear wound with two openings visualized over the antecubital fossa; body surface skin is warm and well perfused  PSYCHIATRIC: upbeat affect, pleasant, cooperative, makes appropriate light-hearted comments with , appears mildly tired.       Labs  Lab Results   Component Value Date    WBC 1.2 08/12/2017 "         Lab Results   Component Value Date    HGB 8.0 08/12/2017     Lab Results   Component Value Date    HCT 24.1 08/12/2017     Lab Results   Component Value Date    PLT 17 08/12/2017       Lab Results   Component Value Date     08/12/2017      Lab Results   Component Value Date    POTASSIUM 3.9 08/12/2017     Lab Results   Component Value Date    CHLORIDE 108 08/12/2017     Lab Results   Component Value Date    VIKTORIYA 9.0 08/12/2017     Lab Results   Component Value Date    CO2 22 08/12/2017     Lab Results   Component Value Date    BUN 17 08/12/2017     Lab Results   Component Value Date    CR 0.42 08/12/2017     Lab Results   Component Value Date    GLC 71 08/12/2017         Assessment and Plan of Care  Ms. Amelia Michel is a  56 year old right hand dominant female who is know to PM&R service.  She has a complicated past medical history including cardiac arrest May 29, 2017 requiring ECMO with post-arrest multiorgan failure including pancytopenia, shock liver, hypoxic respiratory failure, acute kidney injury, anoxic brain injury and critical illness myopathy in addition to ESBL UTI and aspiration pneumonia.  She also has a history of rheumatoid arthritis, a-fib, VSD repair in 1969 and neuropathy of unknown etiology.  She was admitted to rehab June 16-19, 2017 when she was transferred back to acute hospital for fever/sepsis.  She was unfortunately then diagnosed with stage IV diffuse large B cell lymphoma. She was then re-admitted to ARU from August 4-8th, at which point she was transferred back to [facility] for neutropenic fevers and platelet count of 3, where she was found to have strep mitis- intermediate resistance bacteremia.  She has medically improved and continued working with therapy during her medical stay, and she is felt to be appropriate for readmission to the acute rehab unit.    Rehab Issues:  - deficits in strength, activity tolerance, range of motion, functional mobility, balance,  coordination, self-care and safety.     1. Rehab Plan:   - PT for 90 min daily for the duration stay to work on gait exercises, strengthening, endurance buildup, transfers with use of walker as needed.   - OT for 90 min daily for the duration of stay to work on upper and lower body self care, dressing, toileting, bathing, energy conservation techniques with use of assistive devices as needed.   - Rehab RN to administer medication, patient education on medication taking, and vital signs monitoring as well as wound dressing changes and monitoring.     - Rehab Psychologist consulted for ongoing coping following multiple medical events, recent cancer diagnosis, prolonged hospital stay and change in functional status    Diet: Regular diet, thins liquids  Activity: up with assistance only until advanced by therapy team  DVT prophylaxis: mechanical only  Bladder: continue brand for neurogenic bladder       2. Medical Plan:  Due to high complexity of patient's medical status, the Hospitalist team will be following. Please refer to their note for full details of the medical plan.     3. Additional Details of Care Plan:    Code Status: Full Code.     Prognosis for medical and functional improvement with provided therapies and discharge to home is: good    Expected functional outcomes are independent or modified independent ambulation, IADLs and ADLs.    Discharge destination: Home with spouse and possible nursing (wound care primarily)/rehab services    Estimated Length of Stay: 7-10 days    --  Patient discussed with attending Dr. Josue    ---  HILARY Wolf MD  PGY-2 PM&R Resident  Pager: [phone]    I, Dr. Josue, have seen and examined the patient. I have reviewed the above resident's note and agree with content.    RICK Josue MD

## 2017-08-12 NOTE — H&P
Post Admission Physician Evaluation:    I have compared Ms. Michel's condition on admission to acute rehabilitation to that outlined in the preadmission screen. History and physical exam performed by me. No significant differences are identified and the patient remains appropriate for an inpatient rehabilitation facility level of care to manage medical issues and address functional impairments due to (rehabilitation diagnosis) deconditioning.    Comorbid medical conditions being managed: multiple medical conditions (see Epic for details)    Prior functional level: prior to hospitalization she was independent    Present function: walks with FWW with CGA-SBA, mod I lower body dressing, CGA with FWW for transfers.    Anticipated rehabilitation course: will improve with therapies.  Return to level of mod I activities or better and discharge home with family assistance.    Will benefit from intensive rehabilitation includin minutes each of PT and OT  Rehabilitation nursing  Close management by physiatry    Prognosis: good    Estimated length of stay: 7-10 days

## 2017-08-12 NOTE — PLAN OF CARE
Problem: Goal Outcome Summary  Goal: Goal Outcome Summary  Occupational Therapy Discharge Summary     Reason for therapy discharge:    Discharged to acute rehabilitation facility.     Progress towards therapy goal(s). See goals on Care Plan in [facility] electronic health record for goal details.  Goals partially met.  Barriers to achieving goals:   discharge from facility.     Therapy recommendation(s):    Continued therapy is recommended.  Rationale/Recommendations:  ARU to maximize IND with ADLs/IADLs prior to home.

## 2017-08-12 NOTE — PROGRESS NOTES
"D/I: Pt. To transfer to 's acute rehab unit today. Ride set for 1000 however, pt. And nurse asking for ride to be pushed back in order to be ready. SW moved ride to 1045. Met with pt. To update. Nurse in room at the time. Brayden in  Rehab intake also informed. Spoke with Shyann Marinelli PA-C, re: pt.'s d/c and time change.  P: Pt. To transfer to \"s Acute Rehab Unit (ARU) at 1045 via [facility] w/c  "

## 2017-08-12 NOTE — IP AVS SNAPSHOT
"    [facility]: [phone]                                              INTERAGENCY TRANSFER FORM - PHYSICIAN ORDERS   2017                    Hospital Admission Date: 2017  FIDELIA MIDDLETON   : 1960  Sex: Female        Attending Provider: (none)    Allergies:  Metoprolol, No Clinical Screening - See Comments, Penicillins, Tape [Adhesive Tape]    Infection:  VRE-Contact Isolation, ESBL   Service:  ACUTE IP GAUDENCIO    Ht:  1.473 m (4' 10\")   Wt:  54.5 kg (120 lb 3.2 oz)   Admission Wt:  57.4 kg (126 lb 8 oz)    BMI:  25.12 kg/m 2   BSA:  1.49 m 2            Patient PCP Information     Provider PCP Type    Comfort Sabillon MD General      ED Clinical Impression     Diagnosis Description Comment Added By Time Added    Physical deconditioning [R53.81] Physical deconditioning [R53.81]  Regino Josue MD 2017  3:22 PM    Febrile neutropenia (H) [D70.9, R50.81] Febrile neutropenia (H) [D70.9, R50.81]  Regino Josue MD 2017  3:22 PM    Diffuse large B-cell lymphoma, unspecified body region (H) [C83.30] Diffuse large B-cell lymphoma, unspecified body region (H) [C83.30]  Regino Josue MD 2017  3:23 PM    Gram-positive bacteremia [R78.81] Gram-positive bacteremia [R78.81]  Flory Agarwal PA 2017 11:41 AM    Critical illness myopathy [G72.81] Critical illness myopathy [G72.81]  Jamila Cordova MD 2017 12:17 PM    Allergic reaction, subsequent encounter [T78.40XD] Allergic reaction, subsequent encounter [T78.40XD]  Jamila Cordova MD 2017 12:18 PM    Atrial tachycardia (H) [I47.1] Atrial tachycardia (H) [I47.1]  Jamila Cordova MD 2017 12:19 PM      Hospital Problems as of 2017              Priority Class Noted POA    Other rheumatoid arthritis with rheumatoid factor of multiple sites (H)   2016 Yes    Atrial tachycardia (H)   2017 Yes    Cardiogenic shock (H)   2017 Yes    Critical illness myopathy   2017 Yes    Sepsis " (H)   6/19/2017 Yes    Wound of left upper extremity   6/19/2017 Yes    Diffuse large B-cell lymphoma of extranodal site (H)   7/27/2017 Yes    * (Principal)Physical deconditioning   8/12/2017 Yes      Non-Hospital Problems as of 8/19/2017              Priority Class Noted    HTN (hypertension)   Unknown    Primary pulmonary hypertension (H)   Unknown    Hyperlipidemia LDL goal <130   2/22/2011    Lymphedema of both lower extremities   4/4/2017    Acute respiratory failure with hypoxia (H)   6/9/2017    Hypertension   Unknown    Diffuse large B cell lymphoma (H)   8/4/2017    Febrile neutropenia (H)   8/8/2017      Code Status History     Date Active Date Inactive Code Status Order ID Comments User Context    8/16/2017  3:25 PM  Full Code [id number]  Regino Josue MD Outpatient    8/12/2017 11:44 AM 8/16/2017  3:25 PM Full Code [id number]  La Nena Wolf MD Inpatient    8/11/2017  3:47 PM 8/12/2017 11:44 AM Full Code [id number]  Shyann Marinelli PA-C Outpatient    8/8/2017  5:32 PM 8/11/2017  3:47 PM Full Code [id number]  Sheldon Soto MD Inpatient    8/8/2017  2:30 PM 8/8/2017  5:32 PM Full Code [id number]  Flory Agarwal PA Outpatient    8/4/2017 11:54 AM 8/8/2017  2:30 PM Full Code [id number]  Flory Agarwal PA Inpatient    8/4/2017 11:26 AM 8/4/2017 11:54 AM Full Code [id number]  Shyann Marinelli PA-C Outpatient    6/19/2017  9:09 PM 8/4/2017 11:26 AM Full Code [id number]  Ernestina Garcia MD Inpatient    6/19/2017  2:58 PM 6/19/2017  9:09 PM Full Code [id number]  Evie Longo MD Inpatient    6/19/2017  8:57 AM 6/19/2017  2:58 PM Full Code [id number]  Gabriel Zhou MD Outpatient    6/16/2017 11:34 AM 6/19/2017  8:57 AM Full Code [id number]  Flory Agarwal PA Inpatient    6/16/2017  9:16 AM 6/16/2017 11:34 AM Full Code [id number]  Harmony Gilbert, APRN CNP Outpatient    5/29/2017 10:47 AM 6/16/2017  9:16 AM Full Code [id number]  Rick Nguyễn MD  Inpatient    5/19/2017  1:19 PM 5/29/2017 10:47 AM Full Code [id number]  Verna Caba MD Outpatient    5/16/2017  4:16 AM 5/19/2017  1:19 PM Full Code [id number]  Sabas Benites MD ED         Medication Review      START taking        Dose / Directions Comments    calcium-vitamin D 600-400 MG-UNIT per tablet   Commonly known as:  CALTRATE   Used for:  Diffuse large B-cell lymphoma, unspecified body region (H)   Replaces:  Calcium Carb-Cholecalciferol 600-800 MG-UNIT Tabs        Dose:  2 tablet   Take 2 tablets by mouth every morning   Quantity:  60 tablet   Refills:  0        lactobacillus rhamnosus (GG) capsule   Used for:  Physical deconditioning        Dose:  1 capsule   Take 1 capsule by mouth 2 times daily   Quantity:  60 capsule   Refills:  0        triamcinolone 0.5 % Oint ointment   Commonly known as:  KENALOG   Used for:  Allergic reaction, subsequent encounter        Dose:  1 g   Apply 1 g topically 3 times daily   Quantity:  30 g   Refills:  0          CONTINUE these medications which have NOT CHANGED        Dose / Directions Comments    acetaminophen 325 MG tablet   Commonly known as:  TYLENOL   Used for:  Rheumatoid arthritis involving multiple sites with positive rheumatoid factor (H)        Dose:  650 mg   Take 2 tablets (650 mg) by mouth every 4 hours as needed for mild pain   Quantity:  100 tablet   Refills:  0        acyclovir 400 MG tablet   Commonly known as:  ZOVIRAX   Indication:  Treatment to Prevent Cytomegalovirus Disease   Used for:  Diffuse large B-cell lymphoma, unspecified body region (H)        Dose:  400 mg   Take 1 tablet (400 mg) by mouth 2 times daily   Quantity:  60 tablet   Refills:  0        allopurinol 300 MG tablet   Commonly known as:  ZYLOPRIM   Used for:  Diffuse large B-cell lymphoma, unspecified body region (H)        Dose:  300 mg   Take 1 tablet (300 mg) by mouth daily   Quantity:  30 tablet   Refills:  0        ascorbic acid 500 MG Tabs   Used for:   Diffuse large B-cell lymphoma, unspecified body region (H)        Dose:  500 mg   Take 1 tablet (500 mg) by mouth daily   Quantity:  30 tablet   Refills:  0        atenolol 25 MG tablet   Commonly known as:  TENORMIN   Used for:  Atrial tachycardia (H)        Dose:  25 mg   Take 1 tablet (25 mg) by mouth 2 times daily   Quantity:  60 tablet   Refills:  0        cefTRIAXone 1 GM vial   Commonly known as:  ROCEPHIN   Used for:  Gram-positive bacteremia        Dose:  1000 mg   Inject 1 g (1,000 mg) into the muscle daily for 3 days   Quantity:  30 mL   Refills:  0    IV rocephin 8/20-8/23.       cetirizine 10 MG tablet   Commonly known as:  zyrTEC   Used for:  Allergic reaction, subsequent encounter        Dose:  10 mg   Take 1 tablet (10 mg) by mouth daily as needed for allergies   Quantity:  30 tablet   Refills:  0        digoxin 125 MCG tablet   Commonly known as:  LANOXIN   Used for:  Atrial tachycardia (H)        Dose:  125 mcg   Take 1 tablet (125 mcg) by mouth daily   Quantity:  30 tablet   Refills:  0        fluconazole 200 MG tablet   Commonly known as:  DIFLUCAN   Indication:  lymphoma, starting chemotherapy, neutropenic   Used for:  Diffuse large B-cell lymphoma, unspecified body region (H)        Dose:  200 mg   Take 1 tablet (200 mg) by mouth daily   Quantity:  30 tablet   Refills:  0        folic acid 1 MG tablet   Commonly known as:  FOLVITE   Used for:  Diffuse large B-cell lymphoma, unspecified body region (H)        Dose:  1 mg   Take 1 tablet (1 mg) by mouth daily   Quantity:  30 tablet   Refills:  0        gabapentin 100 MG capsule   Commonly known as:  NEURONTIN   Used for:  Critical illness myopathy        Dose:  200 mg   Take 2 capsules (200 mg) by mouth 3 times daily   Quantity:  120 capsule   Refills:  0        levofloxacin 250 MG tablet   Commonly known as:  LEVAQUIN   Indication:  prophylaxis. DLBCL s/p chemo.   Used for:  Diffuse large B-cell lymphoma, unspecified body region (H)        Dose:   250 mg   Start taking on:  8/23/2017   Take 1 tablet (250 mg) by mouth daily   Quantity:  30 tablet   Refills:  0        lidocaine visc 2% & diphenhydramine 12.5mg/5mL & maalox/mylanta w/simethicone (1:1:1 v/v/v) Susp compounding kit   Used for:  Diffuse large B-cell lymphoma, unspecified body region (H)        Dose:  10 mL   Swish and spit 10 mLs in mouth 4 times daily (before meals and nightly)   Quantity:  500 mL   Refills:  0        multivitamin, therapeutic with minerals Tabs tablet   Used for:  Diffuse large B-cell lymphoma, unspecified body region (H)        Dose:  1 tablet   Take 1 tablet by mouth daily   Quantity:  30 each   Refills:  0        potassium chloride SA 20 MEQ CR tablet   Commonly known as:  K-DUR/KLOR-CON M   Used for:  Diffuse large B-cell lymphoma, unspecified body region (H)        Dose:  20 mEq   Take 1 tablet (20 mEq) by mouth daily   Quantity:  90 tablet   Refills:  0        predniSONE 5 MG tablet   Commonly known as:  DELTASONE   Used for:  Diffuse large B-cell lymphoma, unspecified body region (H)        Dose:  5 mg   Take 1 tablet (5 mg) by mouth daily   Quantity:  30 tablet   Refills:  0        thiamine 50 MG Tabs   Used for:  Diffuse large B-cell lymphoma, unspecified body region (H)        Dose:  50 mg   Take 1 tablet (50 mg) by mouth daily   Quantity:  30 tablet   Refills:  0        traMADol 50 MG tablet   Commonly known as:  ULTRAM   Used for:  Diffuse large B-cell lymphoma, unspecified body region (H)        Dose:  25 mg   Take 0.5 tablets (25 mg) by mouth every 6 hours as needed for moderate pain   Quantity:  28 tablet   Refills:  0          STOP taking     Calcium Carb-Cholecalciferol 600-800 MG-UNIT Tabs   Replaced by:  calcium-vitamin D 600-400 MG-UNIT per tablet           filgrastim 15 mcg/mL (in Dextrose) 15 mcg/ml   Commonly known as:  NEUPOGEN           melatonin 1 MG Tabs tablet           senna-docusate 8.6-50 MG per tablet   Commonly known as:  SENOKOT-S;PERICOLACE                      Further instructions from your care team       Follow up  As scheduled with Oncology 8/21, 8/24  You are scheduled to see Dr. Christiano PA-C on Monday, August 21 at 5:40 pm  You are scheduled with Oncology on Thursday, August 24th at 7:30 am    Phone: [phone]  Address: [address] [place] [address]      Cardiology 9/27  You are scheduled to see Dr. Eaton on September 27th at 1:30 pm    Phone: [phone]  Address: [address] [place] [address]    Rheumatology 9/15  You are scheduled to see Dr. Cunha on September 15th at 10 am    Address  [address] [place] [address]  Phone   [phone]    Urology- follow up urinary incontinence/frequency with recommended urodynamic studies.  (referral placed)    HOME CARE  [facility] [phone] will provide RN, PT, OT. They will call you after you discharge to schedule the first visit.       Summary of Visit     Reason for your hospital stay       Rehab after acute hospital stay to regain function so as to go home safely.             After Care     Activity       Up with walker.       Diet       Follow this diet upon discharge: Orders Placed This Encounter      Room Service      Regular Diet Adult       Wound care and dressings       Left upper arm wounds every other day: Clean wound and skin around wound with microklenz.  Paint Cavilon no sting barrier to skin around wound.  Apply Iodosorb gel nickel thick to areas with slough.  Tuck minimal amount of isodosrob gel to undermined area on middle wound with Q-tip. Cover with two mepilex border dressings.             Referrals     Home Care PT Referral for Hospital Discharge       PT to eval and treat    Your provider has ordered home care - physical therapy. If you have not been contacted within 2 days of your discharge please call the department phone number listed on the top of this document.       Home care nursing referral       RN skilled nursing visit. RN to  assess vital signs and weight, respiratory and cardiac status, patients ability to take and record daily blood pressure, temp and weight, pain level and activity tolerance, incision for signs/symptoms of infection, hydration, nutrition and bowel status and home safety.  RN to provide tube site care and management and line care per agency protocol.    Your provider has ordered home care nursing services. If you have not been contacted within 2 days of your discharge please call the inpatient department phone number at [phone] .       Home infusion referral       Your provider has referred you to: [facility]: [facility] [phone]   [email or web address]      Anticipated Length of Therapy: 8/20-8/23    Home Infusion Pharmacist to adjust therapy based on labs and clinical assessments: No (Home Infusion will call for order)    Labs:  May draw labs from Venous Catheter: Yes  Home Infusion Pharmacist to order labs based on therapy type and clinical assessments: Yes  Call/Fax Lab Results to: Primary Care Physician and to Oncologist    Agency Staff to assess nursing needs for Infusion Therapy.    Access Device Management:  IV Access Type: PICC  Flush with Heparin and Normal Saline IVP PRN and routine site care (per agency protocol) to maintain access device? Yes              MD face to face encounter       Documentation of Face to Face and Certification for Home Health Services    I certify that patient: Amelia Michel is under my care and that I, or a nurse practitioner or physician's assistant working with me, had a face-to-face encounter that meets the physician face-to-face encounter requirements with this patient on: 8/16/2017.    This encounter with the patient was in whole, or in part, for the following medical condition, which is the primary reason for home health care: severely limited in mobility. Uses wheeled walker. Has lymphoma and is very disabled  from this.    I certify that, based on my findings, the following services are medically necessary home health services: Nursing, Physical Therapy and home infusion.    My clinical findings support the need for the above services because: Nurse is needed: To provide assessment and oversight required in the home to assure adherence to the medical plan due to: her disability and current requirement for IV abx and very limited mobility.    Further, I certify that my clinical findings support that this patient is homebound (i.e. absences from home require considerable and taxing effort and are for medical reasons or [facility] services or infrequently or of short duration when for other reasons) because: Leaving home is medically contraindicated for the following reason(s): Infection risk / immunocompromised state where it is safer for them to receive services in the home.    Based on the above findings. I certify that this patient is confined to the home and needs intermittent skilled nursing care, physical therapy and/or speech therapy.  The patient is under my care, and I have initiated the establishment of the plan of care.  This patient will be followed by a physician who will periodically review the plan of care.  Physician/Provider to provide follow up care: Comfort Sabillon    Attending hospital physician (the Medicare certified PEC provider): Regino Josue  Physician Signature: See electronic signature associated with these discharge orders.  Date: 8/16/2017                  Your next 10 appointments already scheduled     Aug 21, 2017  4:45 PM CDT   [facility] Lab Draw with  [facility] LAB DRAW   [facility] ([facility])    [address] [place] [id number]   [phone]            Aug 21, 2017  5:40 PM CDT   (Arrive by 5:25 PM)   Lumbar Puncture with Kalpana Alvarado PA-C   [facility] ([facility])     [address] [address]   [phone]            Aug 24, 2017  7:00 AM CDT   [facility] Lab Draw with [facility] LAB DRAW   [facility]    [address] [place] [address]   [phone]            Aug 24, 2017  7:30 AM CDT   Infusion 360 with UC ONCOLOGY INFUSION, UC 13 ATC   [facility] ([facility])    [address] [place] [address]   [phone]            Sep 15, 2017 10:00 AM CDT   (Arrive by 9:45 AM)   New Patient Visit with Pj Cunha MD   [facility] ([facility])    [address] [place] [address]   [phone]            Sep 27, 2017  1:00 PM CDT   (Arrive by 12:45 PM)   Implanted Defibulator with Uc Cv Device 1   [facility] ([facility])    [address] [place] [address]   [phone]            Sep 27, 2017  1:30 PM CDT   (Arrive by 1:15 PM)   RETURN ARRHYTHMIA with Juan Eaton MD   [facility] ([facility])    [address] [place] [address]   [phone]              Follow-Up Appointment Instructions     Future Labs/Procedures    Adult [facility] Follow-up and recommended labs and tests     Comments:    Follow up with oncology as scheduled  8/21 and 8/24 (to start chemo).   Follow up with cardiology as scheduled 9/27  Follow up with rheumatology as scheduled 9/15.  Recommend Urology follow up ongoing frequency and incontinence for urodynamic studies.  Appointments on [place] and/or [facility] (with [facility] or [facility] provider or service). Call [phone] if you haven't heard regarding these appointments within 7 days of discharge.      Follow-Up Appointment Instructions     Adult [facility] Follow-up and recommended labs and tests        Follow up with oncology as scheduled  8/21 and 8/24 (to start chemo).   Follow up with cardiology as scheduled 9/27  Follow up with rheumatology as scheduled 9/15.  Recommend Urology follow up ongoing frequency and incontinence for urodynamic studies.  Appointments on [place] and/or [facility] (with [facility] or [facility] provider or service). Call [phone] if you haven't heard regarding these appointments within 7 days of discharge.             Statement of Approval     Ordered          08/19/17 0958  I have reviewed and agree with all the recommendations and orders detailed in this document.  EFFECTIVE NOW     Approved and electronically signed by:  Taz Ray MD

## 2017-08-13 PROCEDURE — 25000128 H RX IP 250 OP 636: Performed by: PHYSICAL MEDICINE & REHABILITATION

## 2017-08-13 PROCEDURE — 40000193 ZZH STATISTIC PT WARD VISIT: Performed by: PHYSICAL THERAPIST

## 2017-08-13 PROCEDURE — 12800006 ZZH R&B REHAB

## 2017-08-13 PROCEDURE — 25000125 ZZHC RX 250: Performed by: INTERNAL MEDICINE

## 2017-08-13 PROCEDURE — 25000132 ZZH RX MED GY IP 250 OP 250 PS 637: Performed by: INTERNAL MEDICINE

## 2017-08-13 PROCEDURE — 97110 THERAPEUTIC EXERCISES: CPT | Mod: GO

## 2017-08-13 PROCEDURE — 40000133 ZZH STATISTIC OT WARD VISIT

## 2017-08-13 PROCEDURE — 97116 GAIT TRAINING THERAPY: CPT | Mod: GP | Performed by: PHYSICAL THERAPIST

## 2017-08-13 PROCEDURE — 99207 ZZC CONSULT E&M CHANGED TO INITIAL LEVEL: CPT | Performed by: INTERNAL MEDICINE

## 2017-08-13 PROCEDURE — 97110 THERAPEUTIC EXERCISES: CPT | Mod: GP | Performed by: PHYSICAL THERAPIST

## 2017-08-13 PROCEDURE — 99221 1ST HOSP IP/OBS SF/LOW 40: CPT | Performed by: INTERNAL MEDICINE

## 2017-08-13 PROCEDURE — 25000128 H RX IP 250 OP 636: Performed by: INTERNAL MEDICINE

## 2017-08-13 PROCEDURE — 97530 THERAPEUTIC ACTIVITIES: CPT | Mod: GP | Performed by: PHYSICAL THERAPIST

## 2017-08-13 PROCEDURE — 97535 SELF CARE MNGMENT TRAINING: CPT | Mod: GO

## 2017-08-13 RX ORDER — HEPARIN SODIUM,PORCINE 10 UNIT/ML
5-10 VIAL (ML) INTRAVENOUS
Status: DISCONTINUED | OUTPATIENT
Start: 2017-08-13 | End: 2017-08-19 | Stop reason: HOSPADM

## 2017-08-13 RX ORDER — HEPARIN SODIUM,PORCINE 10 UNIT/ML
5-10 VIAL (ML) INTRAVENOUS EVERY 24 HOURS
Status: DISCONTINUED | OUTPATIENT
Start: 2017-08-13 | End: 2017-08-19 | Stop reason: HOSPADM

## 2017-08-13 RX ADMIN — ALLOPURINOL 300 MG: 300 TABLET ORAL at 08:24

## 2017-08-13 RX ADMIN — CEFTRIAXONE SODIUM 1 G: 1 INJECTION, POWDER, FOR SOLUTION INTRAMUSCULAR; INTRAVENOUS at 16:44

## 2017-08-13 RX ADMIN — SODIUM CHLORIDE, PRESERVATIVE FREE 5 ML: 5 INJECTION INTRAVENOUS at 10:44

## 2017-08-13 RX ADMIN — SODIUM CHLORIDE, PRESERVATIVE FREE 10 ML: 5 INJECTION INTRAVENOUS at 18:10

## 2017-08-13 RX ADMIN — THIAMINE HCL (VITAMIN B1) 50 MG TABLET 50 MG: at 08:24

## 2017-08-13 RX ADMIN — POTASSIUM CHLORIDE 20 MEQ: 750 TABLET, FILM COATED, EXTENDED RELEASE ORAL at 08:24

## 2017-08-13 RX ADMIN — DIPHENHYDRAMINE HYDROCHLORIDE AND LIDOCAINE HYDROCHLORIDE AND ALUMINUM HYDROXIDE AND MAGNESIUM HYDRO 10 ML: KIT at 13:16

## 2017-08-13 RX ADMIN — DIPHENHYDRAMINE HYDROCHLORIDE AND LIDOCAINE HYDROCHLORIDE AND ALUMINUM HYDROXIDE AND MAGNESIUM HYDRO 10 ML: KIT at 16:43

## 2017-08-13 RX ADMIN — ACYCLOVIR 400 MG: 400 TABLET ORAL at 08:24

## 2017-08-13 RX ADMIN — DIPHENHYDRAMINE HYDROCHLORIDE AND LIDOCAINE HYDROCHLORIDE AND ALUMINUM HYDROXIDE AND MAGNESIUM HYDRO 10 ML: KIT at 21:25

## 2017-08-13 RX ADMIN — CALCIUM CARBONATE 600 MG (1,500 MG)-VITAMIN D3 400 UNIT TABLET 2 TABLET: at 08:24

## 2017-08-13 RX ADMIN — DIPHENHYDRAMINE HYDROCHLORIDE AND LIDOCAINE HYDROCHLORIDE AND ALUMINUM HYDROXIDE AND MAGNESIUM HYDRO 10 ML: KIT at 06:57

## 2017-08-13 RX ADMIN — OXYCODONE HYDROCHLORIDE AND ACETAMINOPHEN 500 MG: 500 TABLET ORAL at 08:24

## 2017-08-13 RX ADMIN — GABAPENTIN 200 MG: 100 CAPSULE ORAL at 08:24

## 2017-08-13 RX ADMIN — DIGOXIN 125 MCG: 125 TABLET ORAL at 08:24

## 2017-08-13 RX ADMIN — SODIUM CHLORIDE, PRESERVATIVE FREE 10 ML: 5 INJECTION INTRAVENOUS at 06:36

## 2017-08-13 RX ADMIN — ATENOLOL 25 MG: 25 TABLET ORAL at 21:28

## 2017-08-13 RX ADMIN — FLUCONAZOLE 200 MG: 200 TABLET ORAL at 08:24

## 2017-08-13 RX ADMIN — GABAPENTIN 200 MG: 100 CAPSULE ORAL at 21:25

## 2017-08-13 RX ADMIN — FOLIC ACID 1 MG: 1 TABLET ORAL at 08:24

## 2017-08-13 RX ADMIN — ATENOLOL 25 MG: 25 TABLET ORAL at 08:24

## 2017-08-13 RX ADMIN — GABAPENTIN 200 MG: 100 CAPSULE ORAL at 13:24

## 2017-08-13 RX ADMIN — Medication 480 MCG: at 10:21

## 2017-08-13 RX ADMIN — MULTIPLE VITAMINS W/ MINERALS TAB 1 TABLET: TAB at 08:24

## 2017-08-13 RX ADMIN — ACYCLOVIR 400 MG: 400 TABLET ORAL at 21:25

## 2017-08-13 RX ADMIN — PREDNISONE 5 MG: 5 TABLET ORAL at 08:24

## 2017-08-13 NOTE — PLAN OF CARE
Problem: Goal/Outcome  Goal: Goal Outcome Summary  Outcome: No Change  FOCUS/GOAL  Bowel management, Bladder management, Wound care management and Medical management     ASSESSMENT, INTERVENTIONS AND CONTINUING PLAN FOR GOAL:  Pt had incontinent episode of bowel while waiting for staff this evening. Huynh is patent and draining. Catheters cares done, and pt educated on CAUTI prevention. Pt refused assessment and dressing change to LUE. Educated pt about importance of skin monitoring and s/sx of infection. LDA started for wound and will need to be adjusted after wound assessed. Pt also requested a recliner to help elevate her legs, but writer was unable to obtain one. Nursing to follow-up. Denies pain at this time. Continue with POC.

## 2017-08-13 NOTE — PLAN OF CARE
Problem: Goal/Outcome  Goal: Goal Outcome Summary  Outcome: Therapy, progress toward functional goals as expected  Pt chart reviewed, PT evaluated and POC established.  Pt returns to ARU  Following hospital stay in order to progress mobility for safe return home.  Pt able to demonstrate SBA transfers and gait with fww.  Pt does need min A for stairs due to weak hip extensors and hamstrings.  Pt would benefit from skilled PT intervention in order to address aforementioned deficits.     D/C:  ELS is 10 days with d/c to home with intermittent A from  and friends.  Largest barrier will be stairs.     DME:  None at this time

## 2017-08-13 NOTE — PROGRESS NOTES
[facility], [place]   Physical Medicine and Rehabilitation Daily Note    56 year old female who has been admitted previously to our ARU twice.  briefly, the patient has a past medical history of  rheumatoid arthritis, a-fib, VSD repair in 1969 and neuropathy of unknown etiology. She experienced a sudden cardiac arrest of unclear etiology May 29, 2017 requiring ECMO with post-arrest multiorgan failure including pancytopenia, shock liver, hypoxic respiratory failure, acute kidney injury, anoxic brain injury and critical illness myopathy in addition to ESBL UTI and aspiration pneumonia.    She was admitted to rehab June 16-19, 2017 and was good rehab candidate however she was transferred back to [facility] hospital for fever/sepsis workup.  She was unfortunately then diagnosed with stage IV diffuse large B cell lymphoma and began chemotherapy treatments. She was stabilized and has now returned for intensive rehabilitation therapies.  Admitted to ARU on 08/12/2017           Assessment and Plan:     Mobility: Barriers noted / impact on function        Current Function / Plan: Rehab therapies underway today.   Activities of Daily Living: No barriers identified        Current Function / Plan: As above   Language: No barriers identified        Current Function / Plan:    Cognition: No barriers identified        Current Function / Plan:    Psychological: No barriers identified        Current Function / Plan:    Dysphasia / Nutrition: No barriers identified        Current Function / Plan:    Bowel / Bladder Function: No barriers identified        Current Function / Plan:    Patient / Caregiver Support and Educational Needs:    Other: Followed by Hospitalist for multiple, complex medical issues.  See Hospitalist's note for details of medical issues.    Patient would like a more comfortable chair to sit in at bedside. She wants to be able to sit in a comfortable chair. She is tired of sitting up in her  hospital bed. I asked nursing to try to accomodate this request.   Estimated discharge date: 7-10 days   Discharge Plan Notes:                Interval History:   Admitted to ARU yesterday afternoon. Patient gets daily labs, which were not ordered. I ordered them this morning.           Review of Systems:   The 10 point Review of Systems is negative other than noted in the HPI          Medications:     Current Facility-Administered Medications   Medication     sodium chloride (PF) 0.9% PF flush 10-20 mL     heparin lock flush 10 UNIT/ML injection 5-10 mL     heparin lock flush 10 UNIT/ML injection 5-10 mL     acetaminophen (TYLENOL) tablet 650 mg     acyclovir (ZOVIRAX) tablet 400 mg     allopurinol (ZYLOPRIM) tablet 300 mg     ascorbic acid (VITAMIN C) tablet 500 mg     atenolol (TENORMIN) tablet 25 mg     calcium-vitamin D (CALTRATE) 600-400 MG-UNIT per tablet 2 tablet     cefTRIAXone (ROCEPHIN) 1 g vial to attach to  mL bag for ADULTS or NS 50 mL bag for PEDS     cetirizine (zyrTEC) tablet 10 mg     digoxin (LANOXIN) tablet 125 mcg     filgrastim 15 mcg/mL (in Dextrose) (NEUPOGEN) infusion 480 mcg     fluconazole (DIFLUCAN) tablet 200 mg     folic acid (FOLVITE) tablet 1 mg     gabapentin (NEURONTIN) capsule 200 mg     [START ON 8/23/2017] levofloxacin (LEVAQUIN) tablet 250 mg     magic mouthwash suspension (diphenhydramine, lidocaine, aluminum-magnesium & simethicone)     melatonin tablet 1-3 mg     multivitamin, therapeutic with minerals (THERA-VIT-M) tablet 1 tablet     potassium chloride SA (K-DUR/KLOR-CON M) CR tablet 20 mEq     predniSONE (DELTASONE) tablet 5 mg     senna-docusate (SENOKOT-S;PERICOLACE) 8.6-50 MG per tablet 1-2 tablet     thiamine tablet 50 mg     traMADol (ULTRAM) half-tab 25 mg     Patient is already receiving mechanical prophylaxis     naloxone (NARCAN) injection 0.1-0.4 mg             Physical Exam:     Vitals were reviewed  Patient Vitals for the past 24 hrs:   BP Temp Temp src  "Pulse Resp SpO2 Height Weight   08/13/17 0822 149/63 96.5  F (35.8  C) Axillary 71 20 98 % - -   08/12/17 2337 143/60 96.1  F (35.6  C) Axillary 73 16 96 % - -   08/12/17 2149 137/61 - - 73 - - - -   08/12/17 2037 157/67 96.1  F (35.6  C) Axillary 80 16 97 % - -   08/12/17 1700 159/57 95.7  F (35.4  C) Tympanic 74 16 98 % - -   08/12/17 1139 151/41 95.8  F (35.4  C) Tympanic 76 16 96 % 1.473 m (4' 10\") 57.4 kg (126 lb 8 oz)     Constitutional:   awake, alert, cooperative, no apparent distress, and appears stated age     Eyes:   clear     Lungs:   Breathing comfortably     Cardiovascular:   rrr     Musculoskeletal:   no lower extremity pitting edema present     Neurologic:   stable       RICK Josue MD                     "

## 2017-08-13 NOTE — PLAN OF CARE
Problem: Goal/Outcome  Goal: Goal Outcome Summary  Outcome: Therapy, progress toward functional goals as expected  OT evaluation completed today. Therapist noted impaired L hand strength, dexterity, and coordination, and balance impacting safety and independence with self cares using walker. Pt c/o diarrhea impacting urgency but continent of bowel. ELOS 7-10 days. Anticipate home with  and home care.  wants to be able to work from home initially. May benefit from CPT if  unable to work from home initially.

## 2017-08-13 NOTE — PLAN OF CARE
Problem: Goal/Outcome  Goal: Goal Outcome Summary  Outcome: Therapy, progress toward functional goals as expected  Pt continues to progress in therapy demonstrating improved strength for step-ups and stairs with RLE.  She still demonstrates very weak LLE during step-ups especially with side step-ups.  Her stairs have improved from min A to CGA.  Pt would benefit from assessment tomorrow 8/14 for mod I in room.

## 2017-08-13 NOTE — PLAN OF CARE
Problem: Goal/Outcome  Goal: Goal Outcome Summary  FOCUS/GOAL  Bowel management, Wound care management and Medical management     ASSESSMENT, INTERVENTIONS AND CONTINUING PLAN FOR GOAL:  Patient is alert/oriented x4, has been demonstrating ability to use call light and wait for staff to assist.  Patient denies any pain on this shift and has been able to cooperate with therapies, denies dizziness or fatigue except with exertion in therapy.  Patient had one incontinent bowel this morning when patient was waiting for staff to assist with transfer to , stool was soft/loose but still brown and patient denies any abdominal discomfort.  Patient later had another BM but was able to be continent on the toilet and was less loose.  Resident MD was paged to order lab draw this morning before 8am when noted that there was no order to draw for today, order was placed but incorrectly so  was notified and placed order however lab was behind today and was not able to get here until around 10am which is when the Hospitalist was rounding on the patient and told lab not to draw, later confirmed that patient will be on [facility] lab draw schedule and patient is aware.  Patient had Neupogen infusion this am, Rocephin is to be infused 1xdaily and patient was busy with therapies so time changed to be 1600 every day and patient was happy with this plan.  Patient allowed nurse to assess/redress wound to left AC after reading the current orders, will be assessed again per WOC tomorrow, wound has two deep areas that were filled with iodosorb gel so old was cleaned with wound cleanser and cares done per orders with skin prep and the gel reapplied.  Patient is able to ambulate with just Ao1 and walker, please use mask along with contact precautions for patient protection(neutropenic).  No additional care concerns at this time, continue with POC.

## 2017-08-13 NOTE — PROGRESS NOTES
08/13/17 1115   Quick Adds   Type of Visit Initial Occupational Therapy Evaluation   Living Environment   Lives With spouse   Living Arrangements house   Home Accessibility stairs to enter home   Number of Stairs to Enter Home 3   Number of Stairs Within Home 0   Stair Railings at Home none   Transportation Available car;family or friend will provide   Living Environment Comment pt living rambler style home with ; bed and bath on same level.  works but will try to work for home initially. Bathroom withw walk in shower and built in shower chair.  reports placed grab bar for balance while standing in shower.   Self-Care   Dominant Hand right   Usual Activity Tolerance good   Current Activity Tolerance moderate   Regular Exercise yes   Activity/Exercise Type walking   Exercise Amount/Frequency daily   Equipment Currently Used at Home walker, rolling   Activity/Exercise/Self-Care Comment Reports she always has help with B edema stockings and difficulty donning/doffing shoes. pt wants to go home without catheter.   Functional Level Prior   Ambulation 0-->independent   Transferring 0-->independent   Toileting 0-->independent   Bathing 0-->independent   Dressing 0-->independent   Eating 0-->independent   Fall history within last six months no   Which of the above functional risks had a recent onset or change? transferring;toileting;dressing;bathing   Prior Functional Level Comment OT: pt independent in all self cares, cooking, cleaning, and laundry without  AD at PLOF   General Information   Onset of Illness/Injury or Date of Surgery - Date 08/12/17   Referring Physician Dr. Regino Josue   Patient/Family Goals Statement pt's goal is to return home   Additional Occupational Profile Info/Pertinent History of Current Problem Amelia Michel is a 56 year old female who has been admitted previously to our ARU twice.  Please refer to previous H&Ps for full details however in summary, Ms. Michel has a past  medical history of  rheumatoid arthritis, a-fib, VSD repair in 1969 and neuropathy of unknown etiology. She unfortunately experienced a sudden cardiac arrest of unclear etiology May 29, 2017 requiring ECMO with post-arrest multiorgan failure including pancytopenia, shock liver, hypoxic respiratory failure, acute kidney injury, anoxic brain injury and critical illness myopathy in addition to ESBL UTI and aspiration pneumonia.     Precautions/Limitations other (see comments);immunosuppressed  (contact precautions)   Weight-Bearing Status - LUE weight-bearing as tolerated   Weight-Bearing Status - RUE weight-bearing as tolerated   Weight-Bearing Status - LLE weight-bearing as tolerated   Weight-Bearing Status - RLE weight-bearing as tolerated   General Observations Not symptomatic during OT evaluation   General Info Comments Per lab results from yesterday, hemoglobin 8 and platelets at 17. Dr given verbal ok to proceed with OT evaluation despite no new lab results today   Cognitive Status Examination   Orientation orientation to person, place and time   Level of Consciousness alert   Able to Follow Commands WNL/WFL   Personal Safety (Cognitive) good awareness, safety precautions   Memory intact   Attention No deficits were identified   Cognitive Comment pt appears grossly intact. May benefit from CPT prior to returning home if  unable to work from home   Visual Perception   Visual Perception Wears glasses   Visual Perception Comments pt reports no changes in vision noted   Range of Motion (ROM)   ROM Comment BUE AROM WFL. Noted limited L hand DIP AROM   Strength   Strength Comments BUE MMT Gross 3+/5   Hand Strength   Left hand  (pounds) 15 pounds   Right hand  (pounds) 30 pounds   Hand Strength Comments Decreased L hand strength noted.    Coordination   Left hand, nine hole peg test (seconds) 32   Right hand, nine hole peg test (seconds) 25   Left hand, Box and Block test (cubes transferred in 1 minute)  39   Right hand, Box and Block test (cubes transerred in 1 minute) 52   Coordination Comments L hand coordination impairment noted   Clinical Impression   Criteria for Skilled Therapeutic Interventions Met yes, treatment indicated   OT Diagnosis ADL and IADL impairment   Influenced by the following impairments fx'l transfers; L FM dexterity; UB strength and endurance   Assessment of Occupational Performance 5 or more Performance Deficits   Identified Performance Deficits fx'l transfers; L FM dexterity; UB strength; endurance; LB dressing tasks including socks/shoes; cooking, cleaning, and laundry   Clinical Decision Making (Complexity) High complexity   Therapy Frequency daily   Predicted Duration of Therapy Intervention (days/wks) 7-10 days   Anticipated Discharge Disposition Home with Home Therapy   Risks and Benefits of Treatment have been explained. Yes   Patient, Family & other staff in agreement with plan of care Yes   Clinical Impression Comments pt presents to deficits in L FM dexterity, UB strength, and endurance impacting safety and independence with IADLs, ADLs, and fx'l mobility. pt will benefit from continued OT services in order to return with home with spouse at [facility]   Total Evaluation Time   Total Evaluation Time (Minutes) 20

## 2017-08-13 NOTE — CONSULTS
INTERNAL MEDICINE CONSULTATION       DATE OF SERVICE:  08/13/2017       REQUESTING PHYSICIAN:  Regino Josue MD.  / PM&R      REASON FOR CONSULTATION:  History of DLBCL, pancytopenia, bacteremia, for reviewing medical recommendations.      HISTORY OF PRESENT ILLNESS:  Ms. Amelia Michel is a pleasant 56-year-old woman with a history of rheumatoid arthritis, previous cardiac arrest, atrial fibrillation/flutter (not on anticoagulation due to thrombocytopenia) DLBCL, who was initially transferred from acute rehab to Hematology/Oncology Service on 08/08/2017 until 08/12/2017 when she was found to have worsening neutropenic fever and found to have Strep mitis bacteremia.  She was initially started on ertapenem and then she was switched to ceftriaxone the day of discharge to complete a 10-day course of antibiotics and then resume Levaquin 250 mg after that.      With her clinical improvement, now she is transferred back to acute rehab and Medicine consult was sought for further recommendations.      As per her, she is feeling much better.  Denies any fever, chills or rigors.  Denies any cough, cold.  Denies any nausea or vomiting.  Denies any new bowel or bladder issues, although there is wondering when the catheter can be removed (was placed end of last month at the time of her last chemotherapy).        PAST MEDICAL HISTORY:  Reviewed, as noted.       Past Medical History:   Diagnosis Date     Hypertension      Rheumatoid arthritis(714.0)      Past Surgical History:   Procedure Laterality Date     ANESTHESIA CARDIOVERSION N/A 5/17/2017    Procedure: ANESTHESIA CARDIOVERSION;  ANESTHESIA CARDIOVERSION;  Surgeon: GENERIC ANESTHESIA PROVIDER;  Location: [facility] OR     ARTHRODESIS WRIST  2000    Right wrist     BONE MARROW ASPIRATE &BIOPSY  7/12/2017          FOOT SURGERY      4 left and 2 right     RELEASE CARPAL TUNNEL BILATERAL       REPAIR VENTRICULAR SEPTAL DEFECT  1969       SOCIAL HISTORY:  Reviewed, as noted.   Social  History     Social History     Marital status:      Spouse name: Romeo Michel     Number of children: 0     Years of education: N/A     Occupational History      [employer]     Social History Main Topics     Smoking status: Never Smoker     Smokeless tobacco: Never Used     Alcohol use Yes      Comment: Glass of wine two evenings a night     Drug use: No     Sexual activity: Not on file     Other Topics Concern     Parent/Sibling W/ Cabg, Mi Or Angioplasty Before 65f 55m? No     Social History Narrative        FAMILY HISTORY:  Reviewed, as noted.   Family History   Problem Relation Age of Onset     Breast Cancer Mother      Hypertension Mother      Alzheimer Disease Mother      Hypertension Father      Blood Disease Father      Lymphoa     Circulatory Father      A Fib     DIABETES Paternal Grandmother         MEDICATIONS:  Prior to admission were reviewed.     No current facility-administered medications on file prior to encounter.   Current Outpatient Prescriptions on File Prior to Encounter:  acyclovir (ZOVIRAX) 400 MG tablet Take 1 tablet (400 mg) by mouth 2 times daily   allopurinol (ZYLOPRIM) 300 MG tablet Take 1 tablet (300 mg) by mouth daily   ascorbic acid 500 MG TABS Take 1 tablet (500 mg) by mouth daily   atenolol (TENORMIN) 25 MG tablet Take 1 tablet (25 mg) by mouth 2 times daily   Calcium Carb-Cholecalciferol 600-800 MG-UNIT TABS Take 2 tablets by mouth every morning   digoxin (LANOXIN) 125 MCG tablet Take 1 tablet (125 mcg) by mouth daily   filgrastim 15 mcg/mL, in Dextrose, (NEUPOGEN) 15 mcg/ml Inject 32 mLs (480 mcg) into the vein daily   fluconazole (DIFLUCAN) 200 MG tablet Take 1 tablet (200 mg) by mouth daily   folic acid (FOLVITE) 1 MG tablet Take 1 tablet (1 mg) by mouth daily   gabapentin (NEURONTIN) 100 MG capsule Take 2 capsules (200 mg) by mouth 3 times daily   magic mouthwash suspension (diphenhydramine, lidocaine, aluminum-magnesium & simethicone) Swish and  spit 10 mLs in mouth 4 times daily (before meals and nightly)   multivitamin, therapeutic with minerals (THERA-VIT-M) TABS tablet Take 1 tablet by mouth daily   predniSONE (DELTASONE) 5 MG tablet Take 1 tablet (5 mg) by mouth daily   thiamine 50 MG TABS Take 1 tablet (50 mg) by mouth daily   potassium chloride SA (K-DUR/KLOR-CON M) 20 MEQ CR tablet Take 1 tablet (20 mEq) by mouth daily   cefTRIAXone (ROCEPHIN) 1 GM vial Inject 1 g (1,000 mg) into the muscle daily for 11 days   acetaminophen (TYLENOL) 325 MG tablet Take 2 tablets (650 mg) by mouth every 4 hours as needed for mild pain   cetirizine (ZYRTEC) 10 MG tablet Take 1 tablet (10 mg) by mouth daily as needed for allergies   melatonin 1 MG TABS tablet Take 1-3 tablets (1-3 mg) by mouth nightly as needed for sleep   senna-docusate (SENOKOT-S;PERICOLACE) 8.6-50 MG per tablet Take 1-2 tablets by mouth 2 times daily   traMADol (ULTRAM) 50 MG tablet Take 0.5 tablets (25 mg) by mouth every 6 hours as needed for moderate pain   levofloxacin (LEVAQUIN) 250 MG tablet Take 1 tablet (250 mg) by mouth daily     ALLERGIES:  Reviewed.        Allergies   Allergen Reactions     Metoprolol Other (See Comments)     Pt and  report that metoprolol does not work for her and also reports feeling unwell with this medication. She has been able to tolerate atenolol, which as worked in controlling her HR.      No Clinical Screening - See Comments      Penicillin Allergy Skin Test not performed, see antimicrobial management team progress note 7/5/17.       Penicillins      Tape [Adhesive Tape] Rash      REVIEW OF SYSTEMS:  Ten-point review of systems was done and found negative unless mentioned in the HPI.      PHYSICAL EXAMINATION:   VITAL SIGNS:  Temperature 96.5, pulse 71, blood pressure 149/63, respiratory rate 20, saturation 98%.   GENERAL:  Pleasant middle-aged lying in bed, comfortable.   HEENT:  Pupils equal and reactive to light.  Sclerae are anicteric.   NECK:  Supple.    CARDIOVASCULAR:  Normal S1, S2, no S3 or S4.   RESPIRATORY:  Normal vesicular breath sounds, few crackles in the base.   ABDOMEN:  Soft, nontender.   EXTREMITIES:  Pedal edema both lower extremities.   SKIN:  No gross petechia, purpura or rash.   CENTRAL NERVOUS SYSTEM:  She is oriented to person, place and time.   PSYCHIATRIC:  Normal affect.      LABORATORY DATA:  Reviewed, as noted in Epic.   BMP  Recent Labs  Lab 08/14/17  0724 08/12/17  0403 08/11/17  0403 08/10/17  0316    138 137 136   POTASSIUM 3.8 3.9 4.0 3.8   CHLORIDE 108 108 106 105   VIKTORIYA 8.8 9.0 8.8 9.2   CO2 23 22 24 24   BUN 13 17 20 18   CR 0.46* 0.42* 0.44* 0.41*   GLC 78 71 74 86     CBC  Recent Labs  Lab 08/14/17  0724 08/12/17  0403 08/11/17  0403 08/10/17  0316   WBC 3.9* 1.2* 0.5* 0.2*   RBC 3.19* 2.69* 2.80* 2.99*   HGB 10.3* 8.0* 8.1* 8.9*   HCT 29.0* 24.1* 24.5* 26.4*   MCV 91 90 88 88   MCH 32.3 29.7 28.9 29.8   MCHC 35.5 33.2 33.1 33.7   RDW 20.8* 22.2* 22.0* 22.3*   PLT 33* 17* 17* 24*     INRNo lab results found in last 7 days.  LFTs  Recent Labs  Lab 08/14/17  0724 08/12/17  0403 08/11/17  0403 08/10/17  0316   ALKPHOS 194* 169* 154* 148   AST 46* 64* 40 17   * 103* 72* 61*   BILITOTAL 0.8 0.7 0.7 0.9   PROTTOTAL 6.7* 5.9* 5.9* 6.1*   ALBUMIN 3.3* 3.1* 2.9* 2.8*      ASSESSMENT AND RECOMMENDATIONS:   1. Strep mitis intermediate resistance bacteremia on blood cultures on 08/07/2017.  No further grown from cultures from 08/10 to 08/11.     -  Continue ceftriaxone 1 gram q.24h. until 08/23 and then resume Levaquin 250 mg daily.   2.  Parotitis was on clinical exam but no abscess on CT and mumps pcr negative.  Conservative management with warm packs, massage.     3.  Cardiovascular.  Atrial fibrillation/flutter.  Continue on atenolol (dose was increased to b.i.d. in the acute hospital) and digoxin.   -  No anticoagulation due to thrombocytopenia.   -  Previous history of outside hospital cardiac arrest on 05/29/2017, unclear  etiology.  AV prieto blocking agents.  Follow up in Cardiology in 2-3 months.   4. Anasarca volume overload.  She was earlier on 20 mg of IV Lasix which is on hold now.  She looks clinically much better.  We will monitor very closely.   5. Aortic masses on transesophageal echocardiogram, not endocarditis, ? lymphoma, continue chemotherapy.   6. 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  Developed pancytopenia secondary to chemotherapy.  Keep Hgb > 8 and PLT > 10  - Continue on acyclovir, fluconazole and prophylaxis.  Resume Levaquin prophylaxis once done with ceftriaxone.   - Follow up with Dr. Rodriguez.    7. Hyperuricemia.  Continue allopurinol.   8. Rheumatoid arthritis.  Continue 5 mg of p.o. prednisone every day and follow in clinic.    9.  Left upper extremity exacerbation of wound secondary to outside hospital cardiac arrest.  Continue wound cares as per wound care nurse and primary team.   10.  Urinary incontinence/history of saddle anesthesia as per the discharging service urodynamic study as outpatient and will need to continue to follow until then.     11. Steroid-induced hyperglycemia, resolved.   17. Liver function tests derangement secondary to lymphoma as per the discharging service.  Expect to ultimately improve with treatment .      Thank you for getting us involved in the care of Ms. Middleton.  We will continue to follow the patient with you.         SHANNON ARIZMENDI MD             D: 2017 12:15   T: 2017 13:08   MT: TINO      Name:     FIDELIA MIDDLETON   MRN:      [id number]        Account:       [id number]   :      1960           Consult Date:  2017      Document: [id number]

## 2017-08-13 NOTE — PLAN OF CARE
Problem: Goal/Outcome  Goal: Goal Outcome Summary  A&Ox4. VSS. Normal sleep. Bed alarm on for safety. Denies pain. Standby assist with walker for transfers. Edema +1 to BLE, legs wrapped. Patient refused dressing change for left antecubital wound, patient reports that she would like a wound care nurse to do it. PICC in right arm heparin locked. Huynh in place for neurogenic bladder, patent and output 800cc. Patient is able to make needs known. Continue with plan of care.

## 2017-08-14 LAB
ALBUMIN SERPL-MCNC: 3.3 G/DL (ref 3.4–5)
ALP SERPL-CCNC: 194 U/L (ref 40–150)
ALT SERPL W P-5'-P-CCNC: 107 U/L (ref 0–50)
ANION GAP SERPL CALCULATED.3IONS-SCNC: 9 MMOL/L (ref 3–14)
ANISOCYTOSIS BLD QL SMEAR: SLIGHT
AST SERPL W P-5'-P-CCNC: 46 U/L (ref 0–45)
BACTERIA SPEC CULT: NO GROWTH
BACTERIA SPEC CULT: NO GROWTH
BASOPHILS # BLD AUTO: 0.1 10E9/L (ref 0–0.2)
BASOPHILS NFR BLD AUTO: 1.8 %
BILIRUB SERPL-MCNC: 0.8 MG/DL (ref 0.2–1.3)
BUN SERPL-MCNC: 13 MG/DL (ref 7–30)
CALCIUM SERPL-MCNC: 8.8 MG/DL (ref 8.5–10.1)
CHLORIDE SERPL-SCNC: 108 MMOL/L (ref 94–109)
CO2 SERPL-SCNC: 23 MMOL/L (ref 20–32)
CREAT SERPL-MCNC: 0.46 MG/DL (ref 0.52–1.04)
DIFFERENTIAL METHOD BLD: ABNORMAL
EOSINOPHIL # BLD AUTO: 0 10E9/L (ref 0–0.7)
EOSINOPHIL NFR BLD AUTO: 0 %
ERYTHROCYTE [DISTWIDTH] IN BLOOD BY AUTOMATED COUNT: 20.8 % (ref 10–15)
GFR SERPL CREATININE-BSD FRML MDRD: ABNORMAL ML/MIN/1.7M2
GLUCOSE SERPL-MCNC: 78 MG/DL (ref 70–99)
HCT VFR BLD AUTO: 29 % (ref 35–47)
HGB BLD-MCNC: 10.3 G/DL (ref 11.7–15.7)
LYMPHOCYTES # BLD AUTO: 0.3 10E9/L (ref 0.8–5.3)
LYMPHOCYTES NFR BLD AUTO: 7.8 %
MCH RBC QN AUTO: 32.3 PG (ref 26.5–33)
MCHC RBC AUTO-ENTMCNC: 35.5 G/DL (ref 31.5–36.5)
MCV RBC AUTO: 91 FL (ref 78–100)
METAMYELOCYTES # BLD: 0.1 10E9/L
METAMYELOCYTES NFR BLD MANUAL: 1.7 %
MICRO REPORT STATUS: NORMAL
MICRO REPORT STATUS: NORMAL
MICROCYTES BLD QL SMEAR: PRESENT
MONOCYTES # BLD AUTO: 0.3 10E9/L (ref 0–1.3)
MONOCYTES NFR BLD AUTO: 8.7 %
NEUTROPHILS # BLD AUTO: 3.1 10E9/L (ref 1.6–8.3)
NEUTROPHILS NFR BLD AUTO: 80 %
PLATELET # BLD AUTO: 33 10E9/L (ref 150–450)
PLATELET # BLD EST: ABNORMAL 10*3/UL
POTASSIUM SERPL-SCNC: 3.8 MMOL/L (ref 3.4–5.3)
PROT SERPL-MCNC: 6.7 G/DL (ref 6.8–8.8)
RBC # BLD AUTO: 3.19 10E12/L (ref 3.8–5.2)
SODIUM SERPL-SCNC: 140 MMOL/L (ref 133–144)
SPECIMEN SOURCE: NORMAL
SPECIMEN SOURCE: NORMAL
WBC # BLD AUTO: 3.9 10E9/L (ref 4–11)

## 2017-08-14 PROCEDURE — 80053 COMPREHEN METABOLIC PANEL: CPT | Performed by: INTERNAL MEDICINE

## 2017-08-14 PROCEDURE — 99212 OFFICE O/P EST SF 10 MIN: CPT

## 2017-08-14 PROCEDURE — 36592 COLLECT BLOOD FROM PICC: CPT | Performed by: INTERNAL MEDICINE

## 2017-08-14 PROCEDURE — 40000133 ZZH STATISTIC OT WARD VISIT

## 2017-08-14 PROCEDURE — 97110 THERAPEUTIC EXERCISES: CPT | Mod: GP | Performed by: PHYSICAL THERAPIST

## 2017-08-14 PROCEDURE — 97530 THERAPEUTIC ACTIVITIES: CPT | Mod: GP | Performed by: PHYSICAL THERAPIST

## 2017-08-14 PROCEDURE — 25000128 H RX IP 250 OP 636: Performed by: INTERNAL MEDICINE

## 2017-08-14 PROCEDURE — 85004 AUTOMATED DIFF WBC COUNT: CPT | Performed by: INTERNAL MEDICINE

## 2017-08-14 PROCEDURE — 85027 COMPLETE CBC AUTOMATED: CPT | Performed by: INTERNAL MEDICINE

## 2017-08-14 PROCEDURE — 99207 ZZC CDG-MDM COMPONENT: MEETS LOW - DOWN CODED: CPT | Performed by: INTERNAL MEDICINE

## 2017-08-14 PROCEDURE — 86900 BLOOD TYPING SEROLOGIC ABO: CPT | Performed by: INTERNAL MEDICINE

## 2017-08-14 PROCEDURE — 99232 SBSQ HOSP IP/OBS MODERATE 35: CPT | Performed by: INTERNAL MEDICINE

## 2017-08-14 PROCEDURE — 25000125 ZZHC RX 250: Performed by: INTERNAL MEDICINE

## 2017-08-14 PROCEDURE — 97535 SELF CARE MNGMENT TRAINING: CPT | Mod: GO

## 2017-08-14 PROCEDURE — 25000132 ZZH RX MED GY IP 250 OP 250 PS 637: Performed by: INTERNAL MEDICINE

## 2017-08-14 PROCEDURE — 86850 RBC ANTIBODY SCREEN: CPT | Performed by: INTERNAL MEDICINE

## 2017-08-14 PROCEDURE — 25000128 H RX IP 250 OP 636: Performed by: PHYSICAL MEDICINE & REHABILITATION

## 2017-08-14 PROCEDURE — 12800006 ZZH R&B REHAB

## 2017-08-14 PROCEDURE — 40000193 ZZH STATISTIC PT WARD VISIT: Performed by: PHYSICAL THERAPIST

## 2017-08-14 PROCEDURE — 86901 BLOOD TYPING SEROLOGIC RH(D): CPT | Performed by: INTERNAL MEDICINE

## 2017-08-14 RX ORDER — LACTOBACILLUS RHAMNOSUS GG 10B CELL
1 CAPSULE ORAL 2 TIMES DAILY
Status: DISCONTINUED | OUTPATIENT
Start: 2017-08-14 | End: 2017-08-19 | Stop reason: HOSPADM

## 2017-08-14 RX ADMIN — ATENOLOL 25 MG: 25 TABLET ORAL at 20:36

## 2017-08-14 RX ADMIN — Medication 1 CAPSULE: at 20:36

## 2017-08-14 RX ADMIN — FLUCONAZOLE 200 MG: 200 TABLET ORAL at 09:39

## 2017-08-14 RX ADMIN — Medication 480 MCG: at 09:30

## 2017-08-14 RX ADMIN — DIPHENHYDRAMINE HYDROCHLORIDE AND LIDOCAINE HYDROCHLORIDE AND ALUMINUM HYDROXIDE AND MAGNESIUM HYDRO 10 ML: KIT at 13:05

## 2017-08-14 RX ADMIN — FOLIC ACID 1 MG: 1 TABLET ORAL at 09:38

## 2017-08-14 RX ADMIN — ALLOPURINOL 300 MG: 300 TABLET ORAL at 09:38

## 2017-08-14 RX ADMIN — THIAMINE HCL (VITAMIN B1) 50 MG TABLET 50 MG: at 09:37

## 2017-08-14 RX ADMIN — SODIUM CHLORIDE, PRESERVATIVE FREE 5 ML: 5 INJECTION INTRAVENOUS at 07:21

## 2017-08-14 RX ADMIN — PREDNISONE 5 MG: 5 TABLET ORAL at 09:38

## 2017-08-14 RX ADMIN — GABAPENTIN 200 MG: 100 CAPSULE ORAL at 09:38

## 2017-08-14 RX ADMIN — SODIUM CHLORIDE, PRESERVATIVE FREE 5 ML: 5 INJECTION INTRAVENOUS at 09:39

## 2017-08-14 RX ADMIN — Medication 1 CAPSULE: at 13:05

## 2017-08-14 RX ADMIN — ACYCLOVIR 400 MG: 400 TABLET ORAL at 09:38

## 2017-08-14 RX ADMIN — CALCIUM CARBONATE 600 MG (1,500 MG)-VITAMIN D3 400 UNIT TABLET 2 TABLET: at 09:37

## 2017-08-14 RX ADMIN — DIGOXIN 125 MCG: 125 TABLET ORAL at 09:38

## 2017-08-14 RX ADMIN — DIPHENHYDRAMINE HYDROCHLORIDE AND LIDOCAINE HYDROCHLORIDE AND ALUMINUM HYDROXIDE AND MAGNESIUM HYDRO 10 ML: KIT at 09:42

## 2017-08-14 RX ADMIN — GABAPENTIN 200 MG: 100 CAPSULE ORAL at 13:05

## 2017-08-14 RX ADMIN — ACYCLOVIR 400 MG: 400 TABLET ORAL at 20:36

## 2017-08-14 RX ADMIN — CEFTRIAXONE SODIUM 1 G: 1 INJECTION, POWDER, FOR SOLUTION INTRAMUSCULAR; INTRAVENOUS at 16:38

## 2017-08-14 RX ADMIN — POTASSIUM CHLORIDE 20 MEQ: 750 TABLET, FILM COATED, EXTENDED RELEASE ORAL at 09:37

## 2017-08-14 RX ADMIN — DIPHENHYDRAMINE HYDROCHLORIDE AND LIDOCAINE HYDROCHLORIDE AND ALUMINUM HYDROXIDE AND MAGNESIUM HYDRO 10 ML: KIT at 16:40

## 2017-08-14 RX ADMIN — GABAPENTIN 200 MG: 100 CAPSULE ORAL at 20:36

## 2017-08-14 RX ADMIN — ATENOLOL 25 MG: 25 TABLET ORAL at 09:38

## 2017-08-14 RX ADMIN — MULTIPLE VITAMINS W/ MINERALS TAB 1 TABLET: TAB at 09:38

## 2017-08-14 RX ADMIN — OXYCODONE HYDROCHLORIDE AND ACETAMINOPHEN 500 MG: 500 TABLET ORAL at 09:38

## 2017-08-14 NOTE — PLAN OF CARE
Problem: Goal/Outcome  Goal: Goal Outcome Summary  OT: Pt progressing well. Pt ambulated approx 250' in the whitaker with Mod I and FWW. Pt demonstrated walk-in shower transfers x 2 with SBA, VCs, and FWW. Pt engaged in kitchen mobility task with Mod I and FWW when retrieving items from shelves/cupboards using tray on the FWW.

## 2017-08-14 NOTE — PLAN OF CARE
Problem: Goal/Outcome  Goal: Goal Outcome Summary  A&Ox4. Denying pain. Transferring stand-by assist with walker. Dressing to left antecubital remains CDI. Huynh patent and draining clear, yellow urine, output 1000cc. Occasional incontinence of bowel when waiting for assistance to walk to bathroom, no BM this shift. PICC to right arm is heparin locked. Patient appeared to be sleeping and resting comfortably upon rounding. Continue with plan of care.

## 2017-08-14 NOTE — PROGRESS NOTES
"Patient seen and examined     S- feels better. No fever/ no cough  Still has diarrhea due to antibiotics but feels not like C. Diff     4 point ROS done and neg unless mentioned     O-   /71 (BP Location: Left leg)  Pulse 68  Temp 96.5  F (35.8  C) (Axillary)  Resp 18  Ht 1.473 m (4' 10\")  Wt 57.4 kg (126 lb 8 oz)  SpO2 96%  BMI 26.44 kg/m2   Gen- pleasant  laying on bed  HEENT- NAD  Neck- supple  CVS- I+II+ no m/r/g  RS- CTAB, few crackles at base   Abdo- soft, no tenderness. No g/r/r   Ext- edema +     LABS:   BMP  Recent Labs  Lab 08/14/17 0724 08/12/17 0403 08/11/17  0403 08/10/17  0316    138 137 136   POTASSIUM 3.8 3.9 4.0 3.8   CHLORIDE 108 108 106 105   VIKTORIYA 8.8 9.0 8.8 9.2   CO2 23 22 24 24   BUN 13 17 20 18   CR 0.46* 0.42* 0.44* 0.41*   GLC 78 71 74 86     CBC  Recent Labs  Lab 08/14/17 0724 08/12/17 0403 08/11/17  0403 08/10/17  0316   WBC 3.9* 1.2* 0.5* 0.2*   RBC 3.19* 2.69* 2.80* 2.99*   HGB 10.3* 8.0* 8.1* 8.9*   HCT 29.0* 24.1* 24.5* 26.4*   MCV 91 90 88 88   MCH 32.3 29.7 28.9 29.8   MCHC 35.5 33.2 33.1 33.7   RDW 20.8* 22.2* 22.0* 22.3*   PLT 33* 17* 17* 24*     INRNo lab results found in last 7 days.  LFTs  Recent Labs  Lab 08/14/17 0724 08/12/17 0403 08/11/17  0403 08/10/17  0316   ALKPHOS 194* 169* 154* 148   AST 46* 64* 40 17   * 103* 72* 61*   BILITOTAL 0.8 0.7 0.7 0.9   PROTTOTAL 6.7* 5.9* 5.9* 6.1*   ALBUMIN 3.3* 3.1* 2.9* 2.8*      A/P:  Today change: will add culturelle. if diarrhea persists than for C.Diff   1. Strep mitis intermediate resistance bacteremia on blood cultures on 08/07/2017.  No further grown from cultures from 08/10 to 08/11.     -  Continue ceftriaxone 1 gram q.24h. until 08/23 and then resume Levaquin 250 mg daily.   2.  Parotitis was on clinical exam but no abscess on CT and mumps pcr negative.  Conservative management with warm packs, massage.     3.  Cardiovascular.  Atrial fibrillation/flutter.  Continue on atenolol (dose was increased " to b.i.d. in the acute hospital) and digoxin.   -  No anticoagulation due to thrombocytopenia.   -  Previous history of outside hospital cardiac arrest on 05/29/2017, unclear etiology.  AV prieto blocking agents.  Follow up in Cardiology in 2-3 months.   4. Anasarca volume overload.  She was earlier on 20 mg of IV Lasix which is on hold now.  She looks clinically much better.  We will monitor very closely.   5. Aortic masses on transesophageal echocardiogram, not endocarditis, ? lymphoma, continue chemotherapy.   6. 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  Developed pancytopenia secondary to chemotherapy.  Keep Hgb > 8 and PLT > 10  - Continue on acyclovir, fluconazole and prophylaxis.  Resume Levaquin prophylaxis once done with ceftriaxone.   - Follow up with Dr. Rodriguez.    7. Hyperuricemia.  Continue allopurinol.   8. Rheumatoid arthritis.  Continue 5 mg of p.o. prednisone every day and follow in clinic.    9.  Left upper extremity exacerbation of wound secondary to outside hospital cardiac arrest.  Continue wound cares as per wound care nurse and primary team.   10.  Urinary incontinence/history of saddle anesthesia as per the discharging service urodynamic study as outpatient and will need to continue to follow until then.     11. Steroid-induced hyperglycemia, resolved.   17. Liver function tests derangement secondary to lymphoma as per the discharging service.  Expect to ultimately improve with treatment .       Theodore Pennington MD (Pager- [phone])   Internal Medicine/ Hospitalist

## 2017-08-14 NOTE — PROGRESS NOTES
"  [facility]   Acute Rehabilitation Unit  Daily progress note    interval history  Amelia Michel was seen and examined at bedside. She is feeling well and feeling \"more human\", expressing that she is feeling better and more like her old self.  She is sob with activity denies at rest.  No n/v/ ongoing  stool ~3 times per day.  No fevers/chills, no new pain.  Tolerating brand.  Notes she is starting to loose more hair, which is reportedly expected after chemotherapy.  Pleased with progress with therapy.     PT: Pt able to participate well, including 6-inch stair training x 4 steps, with seated rest and then another 4 stairs, omni 4/10.  PT will cont to work on building strength and flexibility to improve function and endurance.  Spoke with pt's  about possibly modifying stairs into home, as they are 9 inch steps x 2, and will need a railing for safety.    medications    lactobacillus rhamnosus (GG)  1 capsule Oral BID     heparin lock flush  5-10 mL Intracatheter Q24H     acyclovir  400 mg Oral BID     allopurinol  300 mg Oral Daily     ascorbic acid  500 mg Oral Daily     atenolol  25 mg Oral BID     calcium-vitamin D  2 tablet Oral QAM     cefTRIAXone  1 g Intravenous Daily     digoxin  125 mcg Oral Daily     filgrastim 15 mcg/mL (in Dextrose)  480 mcg Intravenous Daily     fluconazole  200 mg Oral Daily     folic acid  1 mg Oral Daily     gabapentin  200 mg Oral TID     [START ON 8/23/2017] levofloxacin  250 mg Oral Daily     lidocaine visc 2% & diphenhydramine 12.5mg/5mL & maalox/mylanta w/simethicone (1:1:1 v/v/v)  10 mL Swish & Spit 4x Daily AC & HS     multivitamin, therapeutic with minerals  1 tablet Oral Daily     potassium chloride SA  20 mEq Oral Daily     predniSONE  5 mg Oral Daily     senna-docusate  1-2 tablet Oral BID     thiamine  50 mg Oral Daily        sodium chloride (PF), heparin lock flush, acetaminophen, cetirizine, melatonin, traMADol, - MEDICATION " "INSTRUCTIONS -, naloxone     physical exam  /71 (BP Location: Left leg)  Pulse 68  Temp 96.5  F (35.8  C) (Axillary)  Resp 18  Ht 1.473 m (4' 10\")  Wt 57.4 kg (126 lb 8 oz)  SpO2 96%  BMI 26.44 kg/m2  Gen: alert wearing mask as in gym, nad  Cardio: rrr, tachycardic following activity  Pulm: mild dyspnea on exertion, lungs clear   Abd: soft non tender +BS  Ext: ble in stockings  Neuro/MSK: seen going up stairs with assist from PT    labs  Reviewed.  Ongoing LFT elevation, pancytopenia though WBC, HG, PLT count all improved.     Rehabilitation - continue comprehensive acute inpatient rehabilitation program with multidisciplinary approach including therapies, rehab nursing, and physiatry following. See interval history for updates.      assessment and plan    Ms. Amelia Michel is a 56-year-old woman with a history of rheumatoid arthritis, previous cardiac arrest, atrial fibrillation/flutter (not on anticoagulation due to thrombocytopenia), recently diagnosed DLBCL, who was initially transferred from acute rehab to Hematology/Oncology Service on 08/08/2017.  On 08/12/2017  found to have worsening neutropenic fever and positive blood cultures.  Was admitted to [facility] 8/8-8/12, then readmitted to ARU 8/12 for ongoing rehabilitation, due to impaired activity tolerance, and strength.     Strep mitis bacteremia likely cause of neutropenic fever noted 8/7.  BCx + on 8/7/17 (positive in PICC & peripheral).  Remainder of neutropenic fever work up negative.  Blood cultures from 8/8 -8/11 negative.  Transitioned to Ceftriaxone to complete total of 14 day course antibiotics (through 8/23).  -continue ceftriaxone  - Resume Levaquin 250mg following completion of ceftriaxone.         A fib/flutter.  Rate controlled. Tachycardic overnight on 8/9. s/p 20 mg diltiazem.  Atenolol dose increased. Continue PTA dig/atenolol (at increased BID dose).  No anticoagulation 2/2 to thrombocytopenia.  -continue atenolol 25 mg " bid      OSH cardiac arrest. 5/29/17.  Etiology unclear possibly r/t AV prieto blocking agents.  Meets critieria for secondary prevention ICD but placement is on hold r/t LUE extravasation wound & tx. Of lymphoma.  EP discussed risk vs. Benefit of Life Vest patient defer.    -Plan for f/u in cardiology with [facility] in 2-3 mo.       Anasarca/volume overload. Improved- with  trace edema b/l LE on exam.  -monitor edema  -trend weights     Diffuse Large B Cell Lymphoma.  Stage IV with BM & liver involvement.  S/P cycle 1 DA-R-EPOCH D1 7/28/17 tolerated well however with Rituxan had SOB/hypotension.  Was started on daily neupogen  8/2.    -continue prophy acyclovir,  fluconazole (levofloxacin to resume after completion of ceftriaxone)   - Patient will switch to [facility] for next cycle as not a [facility] (per Jennifer/Susana).  -plan for phone visit 8/16 with Dr. Rodriguez      Pancytopenia 2/2 to chemotherapy & underlying DLBCL with BM involvement  - Maintained on transfusion parameters to keep Hgb > 8 and PLT > 10.  -continue to trend CBC     Parotitis (resolved)  identified on clinical exam & also on CT (no evidence of abscess).  Mumps PCR negative.        Mucositis.  L lateral tongue, lower gum line, R upper buccal mucosa 2/2 neutropenia from recent chemotherapy.   -Continue MMW.      Hyperuricemia-  Continue allopurinol.      LUE extravasation-  wound 2/2 OSH cardiac arrest.  Slowly improving per patient report.    - continue WOCN   -continue wound care every other day per orders    Rheumatoid arthritis. History of seropositive rheumatoid arthritis (anti-CCP positive) since late 1980;s w/ multiple MTP osteotomies, partial right wrist fusion, longstanding therapy consisting of MTX, prednisone and HCQ. Continue 5 mg PO prednisone. Follow-up with [facility] Dr. Conor Cunha in 4-6 weeks after discharge          H/O saddle anethesia/urinary incontinence.   - Urodynamic study outpatient.   -Continue brand (patient to  discuss ongoing brand with Dr. Rodriguez 8/16)        LFT derangement 2/2 to lymphoma involvement of liver. Expect LFTs to ultimately improve with treatment, but may fluctuate in the meantime.  -continue to monitor         Aortic masses on BRE.  Not endocarditis.  Lymphoma (?).  Continue chemotherapy.      Patient seen and discussed with Dr. Josue  PM&R Staff Physician, case discussed with Dr. Aditi Agarwal PA-TAMMY  Rehab Service  Pager: [phone]

## 2017-08-14 NOTE — PROGRESS NOTES
[facility] Nurse Inpatient Wound Assessment    Initial Assessment  Reason for consultation: Evaluate and treat Left arm extravasation wound    ASSESSMENT:   Left arm wound due to extravasation  Status: improved debridement. She has been seen by [facility] on [place]. Recommend Iodosorb gel instead of Medihoney 2/2 to Neutropenia-current goal is to minimize bioburden to decrease risk of infection   TREATMENT  PLAN  Left upper arm wounds every other day: Clean wound and skin around wound with microklenz.  Paint Cavilon no sting barrier to skin around wound.  Apply Iodosorb gel nickel thick to areas with slough.  Tuck minimal amount of isodosrob gel to undermined area on middle wound with Q-tip. Cover with two mepilex border dressings.      Orders written  WO Nurse follow-up plan: Weekly  Nursing to notify the Provider(s) and re-consult the WO Nurse if wound(s) deteriorates or new skin concern.    Patient History  According to provider note(s):  Amelia Michel is a 56 year old female with complex past medical history of prior VSD repair, rheumatoid arthritis on long-term methotrexate, recent dx atrial fibrillation/flutter/SVT who was recently admitted to [facility] on  after an out of hospital cardiac arrest thought to be related to AV prieto blocking agents (angio normal). She made a full recovery and was discharged to rehab on 6/15 with a lifevest. Readmitted from rehab 17 for FUO & afib w rvr. With workup of fever and progressive cytopenias, diagnosis of DLBCL was made. Transferred to the Hematology service on . s/p Cycle 1 R-EPOCH.   Readmitted to [facility], plan is for [facility] with next chemo cycle   Objective Data   Containment of urine/stool: Continent of bowel and Continent of bladder  Active Diet Order    Active Diet Order      Regular Diet Adult  Output:  I/O last 3 completed shifts:  In: -   Out: 2600 [Urine:2600]    Risk Assessment:   Jay Jay Score  Av  Min: 13  Max: 20                            Labs:     Recent Labs  Lab 08/14/17  0724   HGB 10.3*   WBC 3.9*       PHYSICAL EXAM:  Skin assessment: left upper arm    Wound Location:  Left  arm  Wound History: IV extravasation wound with large amount of epidermal loss.      pic taken- 8/7/17 8/11/17 8/14/17- Phone not available for pic today  Measurements (length x width x depth, in cm)   Proximal to distal: 0.1 x 0.1 x 0.2 cm, 2 x 0.9 x 0.9 cm, 2 x 0.7 x 0.5 (measurement has not changed from 8/11/17)  0.6 cm of undermining on middle wound from 7-3 o'clock   Wound Base: all with white slough bases, visible suture in center wound  Palpation of the wound bed: boggy   Periwound skin: intact, closed skin edges  Color: pink  Temperature: normal   Drainage: small  Description of drainage: yellow stained with iodosorb  Odor: none  Pain: minimal with wound cleansing    INTERVENTIONS:  Current support surface: Standard  Atmos Air  Current off-loading measures: Pillows under calves  Visual inspection of wounds(s) completed.  Wound Care: was done per plan of care.  Supplies ordered: Iodosorb  Education provided today: wound care    Discussed plan of care with Patient and RN  Face to face time: 20 minutes

## 2017-08-14 NOTE — PLAN OF CARE
Problem: Goal/Outcome  Goal: Goal Outcome Summary  PT: Pt able to participate well, including 6-inch stair training x 4 steps, with seated rest and then another 4 stairs, omni 4/10.  PT will cont to work on building strength and flexibility to improve function and endurance.  Spoke with pt's  about possibly modifying stairs into home, as they are 9 inch steps x 2, and will need a railing for safety.

## 2017-08-14 NOTE — PLAN OF CARE
Problem: Goal/Outcome  Goal: Goal Outcome Summary  Outcome: No Change  FOCUS/GOAL  Bowel management, Bladder management, and Medical management     ASSESSMENT, INTERVENTIONS AND CONTINUING PLAN FOR GOAL:  Pt's brand patent and draining. LBM 8/13. Pt reported loose stool and refused BM meds this evening. Continue to monitor bowel status. Pt requested that she have orders to see Dr. Miguel. Writer verified in pt's orders that psych consult has been placed. Nursing to continue to follow-up. Pt denies pain at this time. Continue with POC.

## 2017-08-14 NOTE — PLAN OF CARE
Problem: Goal/Outcome  Goal: Goal Outcome Summary  Outcome: No Change  Patient is A&O4, able to make needs known, using call light appropriately. Patient was up with an assist of 1. Bowel sounds present, last BM 08/14, patient complains of two loose stools today, started on lactobacillus, brand catheter in place and draining clear yellow urine. No complaint of dizziness, chest pain or SOB. Dressing on left arm changed with wound today. Tolerating regular diet, no Nausea. PICC line heparin locked. Continue POC.

## 2017-08-15 ENCOUNTER — HOME INFUSION (PRE-WILLOW HOME INFUSION) (OUTPATIENT)
Dept: PHARMACY | Facility: CLINIC | Age: 57
End: 2017-08-15

## 2017-08-15 PROCEDURE — 97530 THERAPEUTIC ACTIVITIES: CPT | Mod: GP

## 2017-08-15 PROCEDURE — 25000128 H RX IP 250 OP 636: Performed by: PHYSICAL MEDICINE & REHABILITATION

## 2017-08-15 PROCEDURE — 40000133 ZZH STATISTIC OT WARD VISIT

## 2017-08-15 PROCEDURE — 25000132 ZZH RX MED GY IP 250 OP 250 PS 637: Performed by: INTERNAL MEDICINE

## 2017-08-15 PROCEDURE — 25000128 H RX IP 250 OP 636: Performed by: INTERNAL MEDICINE

## 2017-08-15 PROCEDURE — 97110 THERAPEUTIC EXERCISES: CPT | Mod: GP

## 2017-08-15 PROCEDURE — 25000125 ZZHC RX 250: Performed by: INTERNAL MEDICINE

## 2017-08-15 PROCEDURE — 40000193 ZZH STATISTIC PT WARD VISIT

## 2017-08-15 PROCEDURE — 97110 THERAPEUTIC EXERCISES: CPT | Mod: GO

## 2017-08-15 PROCEDURE — 97535 SELF CARE MNGMENT TRAINING: CPT | Mod: GO

## 2017-08-15 PROCEDURE — 25000132 ZZH RX MED GY IP 250 OP 250 PS 637: Performed by: PHYSICIAN ASSISTANT

## 2017-08-15 PROCEDURE — 97150 GROUP THERAPEUTIC PROCEDURES: CPT | Mod: GP

## 2017-08-15 PROCEDURE — 12800006 ZZH R&B REHAB

## 2017-08-15 PROCEDURE — 99232 SBSQ HOSP IP/OBS MODERATE 35: CPT | Performed by: INTERNAL MEDICINE

## 2017-08-15 RX ORDER — BENZOCAINE/MENTHOL 6 MG-10 MG
LOZENGE MUCOUS MEMBRANE 2 TIMES DAILY
Status: DISCONTINUED | OUTPATIENT
Start: 2017-08-15 | End: 2017-08-17

## 2017-08-15 RX ADMIN — ACYCLOVIR 400 MG: 400 TABLET ORAL at 08:38

## 2017-08-15 RX ADMIN — ACYCLOVIR 400 MG: 400 TABLET ORAL at 21:04

## 2017-08-15 RX ADMIN — ALLOPURINOL 300 MG: 300 TABLET ORAL at 08:37

## 2017-08-15 RX ADMIN — THIAMINE HCL (VITAMIN B1) 50 MG TABLET 50 MG: at 08:38

## 2017-08-15 RX ADMIN — GABAPENTIN 200 MG: 100 CAPSULE ORAL at 08:37

## 2017-08-15 RX ADMIN — DIGOXIN 125 MCG: 125 TABLET ORAL at 08:37

## 2017-08-15 RX ADMIN — ATENOLOL 25 MG: 25 TABLET ORAL at 08:37

## 2017-08-15 RX ADMIN — OXYCODONE HYDROCHLORIDE AND ACETAMINOPHEN 500 MG: 500 TABLET ORAL at 08:36

## 2017-08-15 RX ADMIN — DIPHENHYDRAMINE HYDROCHLORIDE AND LIDOCAINE HYDROCHLORIDE AND ALUMINUM HYDROXIDE AND MAGNESIUM HYDRO 10 ML: KIT at 21:03

## 2017-08-15 RX ADMIN — DIPHENHYDRAMINE HYDROCHLORIDE AND LIDOCAINE HYDROCHLORIDE AND ALUMINUM HYDROXIDE AND MAGNESIUM HYDRO 10 ML: KIT at 08:35

## 2017-08-15 RX ADMIN — Medication 480 MCG: at 08:49

## 2017-08-15 RX ADMIN — FLUCONAZOLE 200 MG: 200 TABLET ORAL at 08:38

## 2017-08-15 RX ADMIN — FOLIC ACID 1 MG: 1 TABLET ORAL at 08:37

## 2017-08-15 RX ADMIN — ATENOLOL 25 MG: 25 TABLET ORAL at 21:03

## 2017-08-15 RX ADMIN — CEFTRIAXONE SODIUM 1 G: 1 INJECTION, POWDER, FOR SOLUTION INTRAMUSCULAR; INTRAVENOUS at 16:25

## 2017-08-15 RX ADMIN — HYDROCORTISONE: 10 CREAM TOPICAL at 21:03

## 2017-08-15 RX ADMIN — Medication 1 CAPSULE: at 21:04

## 2017-08-15 RX ADMIN — SODIUM CHLORIDE, PRESERVATIVE FREE 5 ML: 5 INJECTION INTRAVENOUS at 09:42

## 2017-08-15 RX ADMIN — CALCIUM CARBONATE 600 MG (1,500 MG)-VITAMIN D3 400 UNIT TABLET 2 TABLET: at 08:37

## 2017-08-15 RX ADMIN — POTASSIUM CHLORIDE 20 MEQ: 750 TABLET, FILM COATED, EXTENDED RELEASE ORAL at 08:37

## 2017-08-15 RX ADMIN — GABAPENTIN 200 MG: 100 CAPSULE ORAL at 21:04

## 2017-08-15 RX ADMIN — HYDROCORTISONE: 10 CREAM TOPICAL at 16:33

## 2017-08-15 RX ADMIN — Medication 1 CAPSULE: at 08:36

## 2017-08-15 RX ADMIN — PREDNISONE 5 MG: 5 TABLET ORAL at 08:38

## 2017-08-15 RX ADMIN — SODIUM CHLORIDE, PRESERVATIVE FREE 5 ML: 5 INJECTION INTRAVENOUS at 06:25

## 2017-08-15 RX ADMIN — MULTIPLE VITAMINS W/ MINERALS TAB 1 TABLET: TAB at 08:38

## 2017-08-15 RX ADMIN — DIPHENHYDRAMINE HYDROCHLORIDE AND LIDOCAINE HYDROCHLORIDE AND ALUMINUM HYDROXIDE AND MAGNESIUM HYDRO 10 ML: KIT at 11:59

## 2017-08-15 RX ADMIN — GABAPENTIN 200 MG: 100 CAPSULE ORAL at 14:14

## 2017-08-15 RX ADMIN — DIPHENHYDRAMINE HYDROCHLORIDE AND LIDOCAINE HYDROCHLORIDE AND ALUMINUM HYDROXIDE AND MAGNESIUM HYDRO 10 ML: KIT at 16:25

## 2017-08-15 NOTE — PLAN OF CARE
Acute Rehab Care Conference/Team Rounds      Type: Team Rounds    Present: Dr Regino Josue, Flory Agarwal PA, Luz Chambers OT, Ruben Irizarry SLP, Radha Wyatt PT, Lali Boothe PT Gala Rutherford Social Work, Bree Herr Dietician, Lindsey Rosales, Laurie Curran , Sydni German RN      Discharge Barriers/Treatment/Education    Rehab Diagnosis: 56-year-old woman with a history of rheumatoid arthritis, previous cardiac arrest, atrial fibrillation/flutter (not on anticoagulation due to thrombocytopenia), recently diagnosed DLBCL, who was initially transferred from acute rehab to Hematology/Oncology Service on 08/08/2017.  On 08/12/2017  found to have worsening neutropenic fever and positive blood cultures.  Was admitted to [facility] 8/8-8/12, then readmitted to ARU 8/12 for ongoing rehabilitation, due to impaired activity tolerance, and strength    Active Medical Co-morbidities/Prognosis: see HPI and all notes.    Safety: Pt not impulsive, alarms not being used. Uses call light appropriately.     Pain: P t denies pain.     Medications, Skin, Tubes/Lines: Left upper arm wound (previous extravasation site)-WOC consulted,     Swallowing/Nutrition: No swallowing concerns, Regular diet, thin liquids. Eating 100% of meals and drinking adequate fluids.     Bowel/Bladder: Both incontinent/continent episodes of bowel. Last BM 8/14. Pt needs min-max assist with pericares, manages own clothing. Huynh intact, dependent of cares.     Psychosocial: Pending assessment.     ADLs/IADLs: Patient progressing well with OT goals, setup and SBA for dressing, bathing, grooming. Patient upgraded to mod I in the room with use of FWW- mod I with bed mobility, chair transfer and toilet transfers. Patient continues with decreased activity tolerance and fatigues quickly with activity. Recommend home with HHC and assist from  with IADLs.     Mobility: Pt has made progress this stay, with improved blood counts and  tolerance for activity, as compared to last stay. Pt still with decreased LE and core strength and activity tolerance. Pt is now Kobi in her room to get to bathroom , in and out of bed with FWW. Pt's biggest barrier now is the fact that their home has 9 inch steps in from the garage.  Spoke with pt's  about trying to halve the height of steps, if possible and adding a railing on at least one side. Pt has been able to amb 200 ft with FWW, sba, omni 4 or 5/10, up and down 4 6-inch steps with 2 rails and sba, slow but able, step-to pattern. Pt owns a 4ww, and may want to rent a wc for longer distances in community or for appointments. Home with home care PT.     Cognition/Language:    Community Re-Entry: Pt would need a 4ww or manual wc for longer community distances.     Transportation: Recommend assist with driving due to decreased endurance; car transfer not a barrier     Decision maker: self    Plan of Care and goals reviewed and updated.    Discharge Plan/Recommendations    Fall Precautions: continue    Patient/Family input to goals:     Anticipated rehab needs following discharge: PT and OT    Anticipated care giver support after discharge:     Estimated length of stay: 08/18/2017    Overall plan for the patient: as above      Utilization Review and Continued Stay Justification    Medical Necessity Criteria:    For any criteria that is not met, please document reason and plan for discharge, transfer, or modification of plan of care to address.    Requires intensive rehabilitation program to treat functional deficits?: Yes    Requires 3x per week or greater involvement of rehabilitation physician to oversee rehabilitation program?: Yes    Requires rehabilitation nursing interventions?: Yes    Patient is making functional progress?: Yes    There is a potential for additional functional progress? Yes    Patient is participating in therapy 3 hours per day a minimum of 5 days per week or 15 hours per  week in 7 day period?:Yes    Has discharge needs that require coordinated discharge planning approach?:Yes      Barriers/Concerns related to meeting medical necessity criteria:  none    Team Plan to Address Concern:  na      Final Physician Sign off    Statement of Approval: reviewed and approved. Regino Josue      Patient Goals        1. Patient Goal: Social Work Focus: Patient and family will be involved in discharge planning.  2. Patient Goal: Social Work Focus: Patient will receive appropriate services as needed.     OT target date for goal attainment: 08/19/17  OT Frequency:  (daily)  OT goal: hygiene/grooming: modified independent  OT goal: upper body dressing: Modified independent  OT goal: lower body dressing: Modified independent  OT goal: upper body bathing: Modified independent  OT goal: lower body bathing: Modified independent  OT goal: toilet transfer/toileting: Modified independent  OT goal: meal preparation: Modified independent  OT goal: home management: Modified independent  OT goal: cognitive: Patient/caregiver will verbalize understanding of cognitive assessment results/recommendations as needed for safe discharge planning  OT/MATTHIAS face-to-face collaboration occurred, patient progress and plan of care reviewed.      PT target date for goal attainment: 08/21/17  PT Frequency: 2x/day for  mins  PT goal: bed mobility: Independent  PT goal: transfers: Modified independent, Assistive device  PT goal: gait: Modified independent, Greater than 200 feet, Rolling walker  PT goal: stairs: Supervision/stand-by assist, 4 stairs (No railings)        PT goal 1: Pt will be Ind with HEP in order to continue to progress between and after therapies  PT Goal 2: Pt will be able to demonstrate gait speed >.8 m/s to indicate decreased falls risk.  PT Goal 3: Pt will be able to tolerate 6 MWT in order to show improved activity tolerance.

## 2017-08-15 NOTE — PROGRESS NOTES
"  [facility]   Acute Rehabilitation Unit  Daily progress note    interval history  Amelia Michel \"Bree\" seen sitting up in bed reports she is overall feeling much better.  Having multiple loose semi formed stools no ab pain no fevers, discussed with Bree and RN will monitor stool at this time, started lactobacillus 8/14.  Tolerating brand would like to try removal, will discuss with Dr. Rodriguez 8/16.  No new sob, n/v/, eating and sleeping \"better\".  Discussed goal for home later this week, feeling scared as has been in hospital for long time, has no specific concerns she can vocalize.      OT: Pt progressing well. Pt ambulated approx 250' in the whitaker with Mod I and FWW. Pt demonstrated walk-in shower transfers x 2 with SBA, VCs, and FWW. Pt engaged in kitchen mobility task with Mod I and FWW when retrieving items from shelves/cupboards using tray on the FWW. See rounds note by Dr. Josue for further details.     medications    lactobacillus rhamnosus (GG)  1 capsule Oral BID     heparin lock flush  5-10 mL Intracatheter Q24H     acyclovir  400 mg Oral BID     allopurinol  300 mg Oral Daily     ascorbic acid  500 mg Oral Daily     atenolol  25 mg Oral BID     calcium-vitamin D  2 tablet Oral QAM     cefTRIAXone  1 g Intravenous Daily     digoxin  125 mcg Oral Daily     filgrastim 15 mcg/mL (in Dextrose)  480 mcg Intravenous Daily     fluconazole  200 mg Oral Daily     folic acid  1 mg Oral Daily     gabapentin  200 mg Oral TID     [START ON 8/23/2017] levofloxacin  250 mg Oral Daily     lidocaine visc 2% & diphenhydramine 12.5mg/5mL & maalox/mylanta w/simethicone (1:1:1 v/v/v)  10 mL Swish & Spit 4x Daily AC & HS     multivitamin, therapeutic with minerals  1 tablet Oral Daily     potassium chloride SA  20 mEq Oral Daily     predniSONE  5 mg Oral Daily     senna-docusate  1-2 tablet Oral BID     thiamine  50 mg Oral Daily        sodium chloride (PF), heparin lock flush, " "acetaminophen, cetirizine, melatonin, traMADol, - MEDICATION INSTRUCTIONS -, naloxone     physical exam  /52 (BP Location: Left leg)  Pulse 72  Temp 95.7  F (35.4  C) (Oral)  Resp 16  Ht 1.473 m (4' 10\")  Wt 57.4 kg (126 lb 8 oz)  SpO2 97%  BMI 26.44 kg/m2  Gen: alert feeling well NAD  Cardio: rrr,+ murmur  Pulm: non labored lungs clear   Abd: soft non tender +BS  Ext: ble in wraps  Neuro/MSK: awake alert moves all extremities.     labs  No new labs     Rehabilitation - continue comprehensive acute inpatient rehabilitation program with multidisciplinary approach including therapies, rehab nursing, and physiatry following. See interval history for updates.      assessment and plan    Ms. Amelia Michel is a 56-year-old woman with a history of rheumatoid arthritis, previous cardiac arrest, atrial fibrillation/flutter (not on anticoagulation due to thrombocytopenia), recently diagnosed DLBCL, who was initially transferred from acute rehab to Hematology/Oncology Service on 08/08/2017.  On 08/12/2017  found to have worsening neutropenic fever and positive blood cultures.  Was admitted to [facility] 8/8-8/12, then readmitted to ARU 8/12 for ongoing rehabilitation, due to impaired activity tolerance, and strength.     Strep mitis bacteremia likely cause of neutropenic fever noted 8/7.  BCx + on 8/7/17 (positive in PICC & peripheral).  Remainder of neutropenic fever work up negative.  Blood cultures from 8/8 -8/11 negative.  Transitioned to Ceftriaxone to complete total of 14 day course antibiotics (through 8/23).  -continue ceftriaxone  - Resume Levaquin 250mg following completion of ceftriaxone.         A fib/flutter.  Rate controlled. Tachycardic overnight on 8/9. s/p 20 mg diltiazem.  Atenolol dose increased. Continue PTA dig/atenolol (at increased BID dose).  No anticoagulation 2/2 to thrombocytopenia.  -continue digoxin 125 mcg daily  -continue atenolol 25 mg bid      OSH cardiac arrest. 5/29/17.  Etiology " unclear possibly r/t AV prieto blocking agents.  Meets critieria for secondary prevention ICD but placement is on hold r/t LUE extravasation wound & tx. Of lymphoma.  EP discussed risk vs. Benefit of Life Vest patient defer.    -Plan for f/u in cardiology with [facility] in 2-3 mo.       Diffuse Large B Cell Lymphoma.  Stage IV with BM & liver involvement.  S/P cycle 1 DA-R-EPOCH D1 7/28/17 tolerated well however with Rituxan had SOB/hypotension.  Was started on daily neupogen  8/2.    -continue prophy acyclovir,  fluconazole (levofloxacin to resume after completion of ceftriaxone)   - Patient will switch to [facility] for next cycle as not a [employer] (per Jennifer/Susana).  -plan for phone visit 8/16 with Dr. Rodriguez      Pancytopenia 2/2 to chemotherapy & underlying DLBCL with BM involvement.    - Maintained on transfusion parameters to keep Hgb > 8 and PLT > 10.  -continue to trend CBC     Parotitis (resolved)  identified on clinical exam & also on CT (no evidence of abscess).  Mumps PCR negative.        Mucositis. (improved) L lateral tongue, lower gum line, R upper buccal mucosa 2/2 neutropenia from recent chemotherapy.   -Continue MMW.      Hyperuricemia-  Continue allopurinol.      LUE extravasation-  wound 2/2 OSH cardiac arrest.  Slowly improving    - continue WOCN   -continue wound care every other day per orders    Rheumatoid arthritis. History of seropositive rheumatoid arthritis (anti-CCP positive) since late 1980;s w/ multiple MTP osteotomies, partial right wrist fusion, longstanding therapy consisting of MTX, prednisone and HCQ.   -Continue 5 mg PO prednisone.  - Follow-up with [facility] Rheumatology clinic Dr. Conor Cunha in 4-6 weeks after discharge      H/O saddle anethesia/urinary incontinence.   - Urodynamic study as outpatient.   -Continue brand (patient to discuss ongoing brand vs removal and trial of voiding with Dr. Rodriguez 8/16)        LFT derangement 2/2 to lymphoma involvement of liver. Expect LFTs to  ultimately improve with treatment, but may fluctuate in the meantime. 8/14 ,  AST 46 (stable)  -continue to monitor      Anasarca/volume overload. Improved- with  trace edema b/l LE on exam. Weight 126 8/12   -monitor edema  -trend weights    Aortic masses on BRE.  Not endocarditis.  Lymphoma (?).  Continue chemotherapy. Ongoing follow up per onc.       Patient seen and discussed with Dr. Josue  PM&R Staff Physician    Flory Agarwal PA-C  Rehab Service  Pager: [phone]

## 2017-08-15 NOTE — PLAN OF CARE
Problem: Goal/Outcome  Goal: Goal Outcome Summary  Outcome: No Change  FOCUS/GOAL  Medical management     ASSESSMENT, INTERVENTIONS AND CONTINUING PLAN FOR GOAL:  See rounds note.

## 2017-08-15 NOTE — PLAN OF CARE
Problem: Goal/Outcome  Goal: Goal Outcome Summary  Pt attended Falls Prevention class today with group of 4 patients.  Pt selected for class due to documented gait deficit and falls risk.  Class includes education in falls risks, how to decrease that risk through behavior and home modifications and energy conservation; and instruction in available equipment designed to increase home safety. Pt was able to verbalize understanding of materials and participated appropriately in the discussion and problem-solving segments of the class.

## 2017-08-15 NOTE — PLAN OF CARE
Problem: Goal/Outcome  Goal: Goal Outcome Summary  Outcome: Improving  Focus: Physio  D: Doing well today. Up with assist of one and walker. New non itchiness raised rash on arms, neck and legs. PA examine rash.  HCT cream ordered.  Neupogen discontinued.  Double lumen Picc line to right arm patent.  Will need iv antibiotics through 8-23-17. Tentative plan is to discharge on Friday but could be delayed if pt. Unable to void after catheter removed. Had soft formed stool today.  [facility] Care referral sent for picc line care and antibiotic administration. They will check into coverage. P: Remove brand catheter 8-16-17 early am.

## 2017-08-15 NOTE — PROVIDER NOTIFICATION
Social Work: Initial Assessment with Discharge Plan     Patient Name: Amelia Michel  : 1960  Age: 56 year old  MRN: [id number]  Completed assessment with: patient  Admitted to ARU: 17     Presenting Information   Date of SW assessment: August 15, 2017  Health Care Directive: Patient considering completing  Primary Health Care Agent: default to next of kin  Secondary Health Care Agent: NA  Living Situation: resides with her  in [place]  Previous Functional Status: previously independent of cares  DME available: see therapy notes  Patient and family understanding of hospitalization: Yes  Cultural/Language/Spiritual Considerations: English speaking        Physical Health  Reason for admission: Cardiac Arrest     Provider Information   Primary Care Physician:Comfort Sabillon MD  : none     Mental Health/Chemical Dependency:   Diagnosis: Plan to connect with Dr Miguel for ongoing support  Alcohol/Tobacco/Narcotis: denied  Support/Services in Place: none  Services Needed/Recommended: none  Sexuality/Intimacy: denied concerns     Support System  Marital Status: ,  Romeo  Family support: pt reported that her sister resides four houses away and will be available for additional support  Other support available: none     Community Resources  Current in home services: none  Previous services: none     Financial/Employment/Education  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  Income Source: employment  Education: trade  Financial Concerns:  denied  Insurance: Preferred One        Discharge Plan   Patient and family discharge goal: Goal to return home with continued support from family  Provided Education on discharge plan: Yes  Patient agreeable to discharge plan:  Yes  Provided education and attained signature for Medicare IM and IRF Patient Rights and Privacy Information provided to patient : No  Provided patient with [place] [facility] [place] Resources: No  Barriers to discharge: Medically stable and progress with therapies     Discharge Recommendations   Disposition: Goal to return home with continued support from family  Transportation Needs: family will provide  Name of Transportation Company and Phone: NA     Additional comments   Pt is a 56 yr old female admitted for an acute rehab stay following cardiac arrest. Pt resides with her  in [place]. Pts  is currently working half-time and planning to continue doing so when pt returns home. Pt stated that her  and sister are available for additional support. Team working towards a safe d/c plan.      Please invite to Care Conference:  Romeo (spouse) - [phone]              08/15/17 1158   Living Arrangements   Lives With spouse   Living Arrangements house   Able to Return to Prior Living Arrangements yes   Home Safety   Patient Feels Safe Living in Home? yes   Discharge Planning   Anticipated Discharge Disposition home with assist   Discharge Needs Assessment   Medical Team notified of plan? yes   Readmission Within The Last 30 Days no previous admission in last 30 days   Equipment Currently Used at Home walker, rolling   Transportation Available car;family or friend will provide

## 2017-08-15 NOTE — PROGRESS NOTES
Therapy: IV Rocephin and possibly neupogen - both are covered.  Insurance: Preferred One  Ded: $500  Met: $500  Co-Insurance: 90%  Max Out of Pocket: $3000  Met: $2795    Please contact Intake with any questions, [phone] or In Basket pool, [facility] ([phone]).    [facility] acute rehab: in reference to admission date 08/12/2017 to check IV rocephin and neupogen coverage.

## 2017-08-15 NOTE — PROGRESS NOTES
"Feels much better   No new issues  No sob or chest pressure   On Exam ;   Alert and oriented . No acute distress  Vital signs:  Temp: 95.7  F (35.4  C) Temp src: Oral BP: 145/52 Pulse: 72   Resp: 16 SpO2: 97 % O2 Device: None (Room air)   Height: 147.3 cm (4' 10\") Weight: 57.4 kg (126 lb 8 oz)  Estimated body mass index is 26.44 kg/(m^2) as calculated from the following:    Height as of this encounter: 1.473 m (4' 10\").    Weight as of this encounter: 57.4 kg (126 lb 8 oz).  Neck ; supple , no JVD  Chest ; equal BS bilaterally , no rales or rhonchi   CVS; RRR, no murmur /rubs or gallops  GI ; soft abdomen, non tender, BS positive  Ext ;  edema , no cynosis  Neuro ; CN 2 to 12 grossly intact , No Facial asymmetry noticed  .  Psych ; appropriate mood and effect  Labs; none today   A/P  Strep Mitis bacteremia ; ct ceftriaxone thru 8/23 and then switch to po levaquin 250 mg daily   Parotitis ; improved . Conservative measures  Afib /flutter ; on atenolol and dig. No anticoagulation due to low plt  Hyperuricemia ; allopurinol  RA ; on 5 mg prednisone   Left upper ext wound ; wound cares   Urinary incontinence and saddle anesthesia ; patient plans to follow up with urology about studies as an out patient  Abnormal LFTs ; thought to be due to lymphoma . Monitor   Spoke with PMR and hematology team on 8/15  Diffuse large B cell lymphoma ; dc neupogen. Hb above 8 and plt above 10 ( goal )                    "

## 2017-08-15 NOTE — PLAN OF CARE
Pt. Has coverage for home care including neupogen if she needs it. Will have to pay $200.00 to meet her deductible.  She is aware and ok with this.

## 2017-08-16 ENCOUNTER — OFFICE VISIT (OUTPATIENT)
Dept: TRANSPLANT | Facility: CLINIC | Age: 57
End: 2017-08-16
Attending: INTERNAL MEDICINE
Payer: COMMERCIAL

## 2017-08-16 DIAGNOSIS — C83.398 DIFFUSE LARGE B-CELL LYMPHOMA OF EXTRANODAL SITE: Primary | ICD-10-CM

## 2017-08-16 LAB
ALBUMIN SERPL-MCNC: 3.3 G/DL (ref 3.4–5)
ALP SERPL-CCNC: 180 U/L (ref 40–150)
ALT SERPL W P-5'-P-CCNC: 87 U/L (ref 0–50)
ANION GAP SERPL CALCULATED.3IONS-SCNC: 11 MMOL/L (ref 3–14)
ANISOCYTOSIS BLD QL SMEAR: ABNORMAL
AST SERPL W P-5'-P-CCNC: 40 U/L (ref 0–45)
BACTERIA SPEC CULT: NO GROWTH
BASOPHILS # BLD AUTO: 0.1 10E9/L (ref 0–0.2)
BASOPHILS NFR BLD AUTO: 1.8 %
BILIRUB DIRECT SERPL-MCNC: 0.3 MG/DL (ref 0–0.2)
BILIRUB SERPL-MCNC: 0.9 MG/DL (ref 0.2–1.3)
BUN SERPL-MCNC: 12 MG/DL (ref 7–30)
CALCIUM SERPL-MCNC: 8.8 MG/DL (ref 8.5–10.1)
CHLORIDE SERPL-SCNC: 110 MMOL/L (ref 94–109)
CO2 SERPL-SCNC: 21 MMOL/L (ref 20–32)
CREAT SERPL-MCNC: 0.43 MG/DL (ref 0.52–1.04)
DIFFERENTIAL METHOD BLD: ABNORMAL
EOSINOPHIL # BLD AUTO: 0 10E9/L (ref 0–0.7)
EOSINOPHIL NFR BLD AUTO: 0.9 %
ERYTHROCYTE [DISTWIDTH] IN BLOOD BY AUTOMATED COUNT: 22.3 % (ref 10–15)
GFR SERPL CREATININE-BSD FRML MDRD: >90 ML/MIN/1.7M2
GLUCOSE SERPL-MCNC: 83 MG/DL (ref 70–99)
HCT VFR BLD AUTO: 30.4 % (ref 35–47)
HGB BLD-MCNC: 9.8 G/DL (ref 11.7–15.7)
LYMPHOCYTES # BLD AUTO: 0.4 10E9/L (ref 0.8–5.3)
LYMPHOCYTES NFR BLD AUTO: 8.3 %
MACROCYTES BLD QL SMEAR: PRESENT
MCH RBC QN AUTO: 30.1 PG (ref 26.5–33)
MCHC RBC AUTO-ENTMCNC: 32.2 G/DL (ref 31.5–36.5)
MCV RBC AUTO: 93 FL (ref 78–100)
METAMYELOCYTES # BLD: 0 10E9/L
METAMYELOCYTES NFR BLD MANUAL: 0.9 %
MICROCYTES BLD QL SMEAR: PRESENT
MONOCYTES # BLD AUTO: 0.5 10E9/L (ref 0–1.3)
MONOCYTES NFR BLD AUTO: 9.2 %
MYELOCYTES # BLD: 0 10E9/L
MYELOCYTES NFR BLD MANUAL: 0.9 %
NEUTROPHILS # BLD AUTO: 4.1 10E9/L (ref 1.6–8.3)
NEUTROPHILS NFR BLD AUTO: 78 %
NRBC # BLD AUTO: 0.1 10*3/UL
NRBC BLD AUTO-RTO: 3 /100
PLATELET # BLD AUTO: 49 10E9/L (ref 150–450)
PLATELET # BLD EST: ABNORMAL 10*3/UL
POTASSIUM SERPL-SCNC: 3.9 MMOL/L (ref 3.4–5.3)
PROT SERPL-MCNC: 6.5 G/DL (ref 6.8–8.8)
RBC # BLD AUTO: 3.26 10E12/L (ref 3.8–5.2)
SODIUM SERPL-SCNC: 142 MMOL/L (ref 133–144)
SPECIMEN SOURCE: NORMAL
WBC # BLD AUTO: 5.3 10E9/L (ref 4–11)

## 2017-08-16 PROCEDURE — 25000128 H RX IP 250 OP 636: Performed by: INTERNAL MEDICINE

## 2017-08-16 PROCEDURE — 25000125 ZZHC RX 250: Performed by: INTERNAL MEDICINE

## 2017-08-16 PROCEDURE — 40000193 ZZH STATISTIC PT WARD VISIT

## 2017-08-16 PROCEDURE — 12800006 ZZH R&B REHAB

## 2017-08-16 PROCEDURE — 25000132 ZZH RX MED GY IP 250 OP 250 PS 637: Performed by: INTERNAL MEDICINE

## 2017-08-16 PROCEDURE — 82248 BILIRUBIN DIRECT: CPT | Performed by: INTERNAL MEDICINE

## 2017-08-16 PROCEDURE — 97535 SELF CARE MNGMENT TRAINING: CPT | Mod: GO

## 2017-08-16 PROCEDURE — 97530 THERAPEUTIC ACTIVITIES: CPT | Mod: GP

## 2017-08-16 PROCEDURE — 97116 GAIT TRAINING THERAPY: CPT | Mod: GP

## 2017-08-16 PROCEDURE — 25000132 ZZH RX MED GY IP 250 OP 250 PS 637: Performed by: PHYSICIAN ASSISTANT

## 2017-08-16 PROCEDURE — 40000187 ZZH STATISTIC PATIENT MED CONFERENCE < 30 MIN

## 2017-08-16 PROCEDURE — 97110 THERAPEUTIC EXERCISES: CPT | Mod: GO

## 2017-08-16 PROCEDURE — 36592 COLLECT BLOOD FROM PICC: CPT | Performed by: INTERNAL MEDICINE

## 2017-08-16 PROCEDURE — 97110 THERAPEUTIC EXERCISES: CPT | Mod: GP

## 2017-08-16 PROCEDURE — 40000133 ZZH STATISTIC OT WARD VISIT

## 2017-08-16 PROCEDURE — 85025 COMPLETE CBC W/AUTO DIFF WBC: CPT | Performed by: INTERNAL MEDICINE

## 2017-08-16 PROCEDURE — 80053 COMPREHEN METABOLIC PANEL: CPT | Performed by: INTERNAL MEDICINE

## 2017-08-16 PROCEDURE — 40000183 ZZH STATISTIC PT MED CONFERENCE < 30 MIN

## 2017-08-16 PROCEDURE — 97530 THERAPEUTIC ACTIVITIES: CPT | Mod: GO

## 2017-08-16 PROCEDURE — 25000128 H RX IP 250 OP 636: Performed by: PHYSICAL MEDICINE & REHABILITATION

## 2017-08-16 RX ORDER — CETIRIZINE HYDROCHLORIDE 10 MG/1
10 TABLET ORAL DAILY
Status: DISCONTINUED | OUTPATIENT
Start: 2017-08-16 | End: 2017-08-19 | Stop reason: HOSPADM

## 2017-08-16 RX ORDER — DEXAMETHASONE 4 MG/1
12 TABLET ORAL ONCE
Status: CANCELLED | OUTPATIENT
Start: 2017-09-07

## 2017-08-16 RX ORDER — SODIUM CHLORIDE 9 MG/ML
1000 INJECTION, SOLUTION INTRAVENOUS CONTINUOUS PRN
Status: CANCELLED
Start: 2017-09-07

## 2017-08-16 RX ORDER — DIPHENHYDRAMINE HCL 25 MG
25 CAPSULE ORAL EVERY 6 HOURS PRN
Status: DISCONTINUED | OUTPATIENT
Start: 2017-08-16 | End: 2017-08-19 | Stop reason: HOSPADM

## 2017-08-16 RX ORDER — CETIRIZINE HYDROCHLORIDE 5 MG/1
5 TABLET ORAL DAILY
Status: DISCONTINUED | OUTPATIENT
Start: 2017-08-16 | End: 2017-08-16

## 2017-08-16 RX ORDER — MEPERIDINE HYDROCHLORIDE 25 MG/ML
25 INJECTION INTRAMUSCULAR; INTRAVENOUS; SUBCUTANEOUS EVERY 30 MIN PRN
Status: CANCELLED | OUTPATIENT
Start: 2017-08-23

## 2017-08-16 RX ORDER — LORAZEPAM 0.5 MG/1
.5-1 TABLET ORAL EVERY 6 HOURS PRN
Status: CANCELLED
Start: 2017-09-07

## 2017-08-16 RX ORDER — LORAZEPAM 2 MG/ML
0.5 INJECTION INTRAMUSCULAR EVERY 4 HOURS PRN
Status: CANCELLED
Start: 2017-08-23

## 2017-08-16 RX ORDER — MEPERIDINE HYDROCHLORIDE 25 MG/ML
25 INJECTION INTRAMUSCULAR; INTRAVENOUS; SUBCUTANEOUS EVERY 30 MIN PRN
Status: CANCELLED | OUTPATIENT
Start: 2017-09-07

## 2017-08-16 RX ORDER — SODIUM BICARBONATE 84 MG/ML
50 INJECTION, SOLUTION INTRAVENOUS
Status: CANCELLED | OUTPATIENT
Start: 2017-09-07

## 2017-08-16 RX ORDER — DIPHENHYDRAMINE HCL 25 MG
50 CAPSULE ORAL ONCE
Status: CANCELLED
Start: 2017-08-23

## 2017-08-16 RX ORDER — DEXAMETHASONE 4 MG/1
8 TABLET ORAL DAILY
Status: CANCELLED | OUTPATIENT
Start: 2017-09-08

## 2017-08-16 RX ORDER — DIPHENHYDRAMINE HYDROCHLORIDE 50 MG/ML
50 INJECTION INTRAMUSCULAR; INTRAVENOUS
Status: CANCELLED
Start: 2017-09-07

## 2017-08-16 RX ORDER — EPINEPHRINE 0.3 MG/.3ML
0.3 INJECTION SUBCUTANEOUS EVERY 5 MIN PRN
Status: CANCELLED | OUTPATIENT
Start: 2017-08-23

## 2017-08-16 RX ORDER — ACETAMINOPHEN 325 MG/1
650 TABLET ORAL ONCE
Status: CANCELLED
Start: 2017-08-23

## 2017-08-16 RX ORDER — MEPERIDINE HYDROCHLORIDE 25 MG/ML
25 INJECTION INTRAMUSCULAR; INTRAVENOUS; SUBCUTANEOUS
Status: CANCELLED
Start: 2017-08-23

## 2017-08-16 RX ORDER — EPINEPHRINE 1 MG/ML
0.3 INJECTION INTRAMUSCULAR; INTRAVENOUS; SUBCUTANEOUS EVERY 5 MIN PRN
Status: CANCELLED | OUTPATIENT
Start: 2017-08-23

## 2017-08-16 RX ORDER — SODIUM CHLORIDE 9 MG/ML
1000 INJECTION, SOLUTION INTRAVENOUS CONTINUOUS PRN
Status: CANCELLED
Start: 2017-08-23

## 2017-08-16 RX ORDER — ALBUTEROL SULFATE 90 UG/1
1-2 AEROSOL, METERED RESPIRATORY (INHALATION)
Status: CANCELLED
Start: 2017-08-23

## 2017-08-16 RX ORDER — METHYLPREDNISOLONE SODIUM SUCCINATE 125 MG/2ML
125 INJECTION, POWDER, LYOPHILIZED, FOR SOLUTION INTRAMUSCULAR; INTRAVENOUS
Status: CANCELLED
Start: 2017-09-07

## 2017-08-16 RX ORDER — ALBUTEROL SULFATE 90 UG/1
1-2 AEROSOL, METERED RESPIRATORY (INHALATION)
Status: CANCELLED
Start: 2017-09-07

## 2017-08-16 RX ORDER — LEUCOVORIN CALCIUM 350 MG/17.5ML
50 INJECTION, POWDER, LYOPHILIZED, FOR SOLUTION INTRAMUSCULAR; INTRAVENOUS ONCE
Status: CANCELLED | OUTPATIENT
Start: 2017-09-08

## 2017-08-16 RX ORDER — ONDANSETRON 8 MG/1
16 TABLET, FILM COATED ORAL ONCE
Status: CANCELLED | OUTPATIENT
Start: 2017-09-07

## 2017-08-16 RX ORDER — ALBUTEROL SULFATE 0.83 MG/ML
2.5 SOLUTION RESPIRATORY (INHALATION)
Status: CANCELLED | OUTPATIENT
Start: 2017-08-23

## 2017-08-16 RX ORDER — METHYLPREDNISOLONE SODIUM SUCCINATE 125 MG/2ML
125 INJECTION, POWDER, LYOPHILIZED, FOR SOLUTION INTRAMUSCULAR; INTRAVENOUS
Status: CANCELLED
Start: 2017-08-23

## 2017-08-16 RX ORDER — ALBUTEROL SULFATE 0.83 MG/ML
2.5 SOLUTION RESPIRATORY (INHALATION)
Status: CANCELLED | OUTPATIENT
Start: 2017-09-07

## 2017-08-16 RX ORDER — LORAZEPAM 2 MG/ML
.5-1 INJECTION INTRAMUSCULAR EVERY 6 HOURS PRN
Status: CANCELLED | OUTPATIENT
Start: 2017-09-07

## 2017-08-16 RX ORDER — PROCHLORPERAZINE MALEATE 10 MG
10 TABLET ORAL EVERY 6 HOURS PRN
Status: CANCELLED
Start: 2017-09-07

## 2017-08-16 RX ORDER — EPINEPHRINE 1 MG/ML
0.3 INJECTION INTRAMUSCULAR; INTRAVENOUS; SUBCUTANEOUS EVERY 5 MIN PRN
Status: CANCELLED | OUTPATIENT
Start: 2017-09-07

## 2017-08-16 RX ORDER — DIPHENHYDRAMINE HYDROCHLORIDE 50 MG/ML
50 INJECTION INTRAMUSCULAR; INTRAVENOUS
Status: CANCELLED
Start: 2017-08-23

## 2017-08-16 RX ORDER — PALONOSETRON 0.05 MG/ML
0.25 INJECTION, SOLUTION INTRAVENOUS ONCE
Status: CANCELLED
Start: 2017-08-23

## 2017-08-16 RX ORDER — LEUCOVORIN CALCIUM 350 MG/17.5ML
25 INJECTION, POWDER, LYOPHILIZED, FOR SOLUTION INTRAMUSCULAR; INTRAVENOUS EVERY 6 HOURS
Status: CANCELLED | OUTPATIENT
Start: 2017-09-08

## 2017-08-16 RX ADMIN — FOLIC ACID 1 MG: 1 TABLET ORAL at 08:33

## 2017-08-16 RX ADMIN — DIGOXIN 125 MCG: 125 TABLET ORAL at 08:32

## 2017-08-16 RX ADMIN — GABAPENTIN 200 MG: 100 CAPSULE ORAL at 08:32

## 2017-08-16 RX ADMIN — CEFTRIAXONE SODIUM 1 G: 1 INJECTION, POWDER, FOR SOLUTION INTRAMUSCULAR; INTRAVENOUS at 17:12

## 2017-08-16 RX ADMIN — SODIUM CHLORIDE, PRESERVATIVE FREE 5 ML: 5 INJECTION INTRAVENOUS at 08:05

## 2017-08-16 RX ADMIN — DIPHENHYDRAMINE HYDROCHLORIDE AND LIDOCAINE HYDROCHLORIDE AND ALUMINUM HYDROXIDE AND MAGNESIUM HYDRO 10 ML: KIT at 17:12

## 2017-08-16 RX ADMIN — ALLOPURINOL 300 MG: 300 TABLET ORAL at 08:32

## 2017-08-16 RX ADMIN — PREDNISONE 5 MG: 5 TABLET ORAL at 08:33

## 2017-08-16 RX ADMIN — HYDROCORTISONE: 10 CREAM TOPICAL at 08:46

## 2017-08-16 RX ADMIN — MULTIPLE VITAMINS W/ MINERALS TAB 1 TABLET: TAB at 08:32

## 2017-08-16 RX ADMIN — ACYCLOVIR 400 MG: 400 TABLET ORAL at 22:10

## 2017-08-16 RX ADMIN — POTASSIUM CHLORIDE 20 MEQ: 750 TABLET, FILM COATED, EXTENDED RELEASE ORAL at 08:32

## 2017-08-16 RX ADMIN — SODIUM CHLORIDE, PRESERVATIVE FREE 10 ML: 5 INJECTION INTRAVENOUS at 18:18

## 2017-08-16 RX ADMIN — ATENOLOL 25 MG: 25 TABLET ORAL at 22:10

## 2017-08-16 RX ADMIN — SODIUM CHLORIDE, PRESERVATIVE FREE 10 ML: 5 INJECTION INTRAVENOUS at 06:44

## 2017-08-16 RX ADMIN — ACYCLOVIR 400 MG: 400 TABLET ORAL at 08:33

## 2017-08-16 RX ADMIN — FLUCONAZOLE 200 MG: 200 TABLET ORAL at 08:33

## 2017-08-16 RX ADMIN — CETIRIZINE HYDROCHLORIDE 10 MG: 10 TABLET, FILM COATED ORAL at 12:15

## 2017-08-16 RX ADMIN — SODIUM CHLORIDE, PRESERVATIVE FREE 10 ML: 5 INJECTION INTRAVENOUS at 06:43

## 2017-08-16 RX ADMIN — Medication 1 CAPSULE: at 22:10

## 2017-08-16 RX ADMIN — Medication 1 CAPSULE: at 08:33

## 2017-08-16 RX ADMIN — CALCIUM CARBONATE 600 MG (1,500 MG)-VITAMIN D3 400 UNIT TABLET 2 TABLET: at 08:32

## 2017-08-16 RX ADMIN — THIAMINE HCL (VITAMIN B1) 50 MG TABLET 50 MG: at 08:33

## 2017-08-16 RX ADMIN — GABAPENTIN 200 MG: 100 CAPSULE ORAL at 22:10

## 2017-08-16 RX ADMIN — DIPHENHYDRAMINE HYDROCHLORIDE AND LIDOCAINE HYDROCHLORIDE AND ALUMINUM HYDROXIDE AND MAGNESIUM HYDRO 10 ML: KIT at 12:15

## 2017-08-16 RX ADMIN — DIPHENHYDRAMINE HYDROCHLORIDE AND LIDOCAINE HYDROCHLORIDE AND ALUMINUM HYDROXIDE AND MAGNESIUM HYDRO 10 ML: KIT at 22:10

## 2017-08-16 RX ADMIN — ATENOLOL 25 MG: 25 TABLET ORAL at 08:33

## 2017-08-16 RX ADMIN — OXYCODONE HYDROCHLORIDE AND ACETAMINOPHEN 500 MG: 500 TABLET ORAL at 08:32

## 2017-08-16 RX ADMIN — HYDROCORTISONE: 10 CREAM TOPICAL at 22:19

## 2017-08-16 RX ADMIN — GABAPENTIN 200 MG: 100 CAPSULE ORAL at 13:26

## 2017-08-16 NOTE — PROGRESS NOTES
"SPIRITUAL HEALTH SERVICES  SPIRITUAL ASSESSMENT Progress Note  [facility] ([facility]) Acute Rehab     REFERRAL SOURCE: Follow up    Shared reflective conversation with Amelia about her experience in the hospital this summer, the various bumps along the way, finally having a diagnosis, now being in rehab and looking towards going home this weekend. She shared her nervousness about going home and adjusting to \"the new normal\" - needing to slow down and make sure everything she's doing is safe. She expressed gratitude that she has had so many people caring for her while she was here, and that her medical team was able to figure out what was causing her various ailments, because her symptoms were unusual. She shared that she's looking forward to normal things, like sitting on her back deck, being outside, watching the birds, seeing planes fly overhead. We shared our well-wishes for each other, as we both leave the hospital after this summer experience.    PLAN: No follow up needed; patient expects to be discharged this weekend, and then will be treated in outpatient.    Caitie Colón   Intern  Pager [phone]  "

## 2017-08-16 NOTE — PLAN OF CARE
Problem: Goal/Outcome  Goal: Goal Outcome Summary  FOCUS/GOAL  Bladder management, Medical management, and Discharge planning     ASSESSMENT, INTERVENTIONS AND CONTINUING PLAN FOR GOAL:  Patient is alert and oriented and able to make her needs known. She is modified independent in her room with a walker. Patient is planning on discharging this weekend. Huynh catheter removed around 0530 this morning. Patient voided 125cc at 1030 and PVR 13cc. Patient voided 100cc at 1300 and PVR was 16cc. PA would like staff to continue obtaining PVRs at this time. Writer notified oncoming shift. Patient has been experiencing frequent, soft stools. Senna discontinued today. Rash noted to patient's LUE yesterday. Today, rash had spread to chest, back, and bilateral thighs. Patient received scheduled Hydrocortisone cream. New order noted for Zyrtec and PRN Benadryl. Patient denies any itchiness or discomfort. Patient reports experiencing this type of rash in the past. Formal care conference held today in preparation for patient's discharge, which was decided to be on Saturday, 8/19. Home infusion contacted today and PLC class for IV/PICC scheduled on 8/18 at 1300 with spouse. Patient will have in home therapy initially. Mepilex dressings to be sent home with patient and writer notified charge RN that wound supplies needed to be ordered for patient. Patient's spouse declined need for wound dressing teaching. Patient remains on contact precautions for VRE, but protective precautions discontinued today. Patient denies any difficulty with pain.

## 2017-08-16 NOTE — MR AVS SNAPSHOT
After Visit Summary   8/16/2017    Amelia Michel    MRN: [id number]           Patient Information     Date Of Birth          1960        Visit Information        Provider Department      8/16/2017 12:15 PM Lenore Rodriguez MD [facility] Blood and Marrow Transplant        Today's Diagnoses     Diffuse large B-cell lymphoma of extranodal site (H)    -  1          [facility] ([facility])  [address] [place] [address]  Phone: [phone]  Clinic Hours:   Monday-Thursday:7am to 7pm   Friday: 7am to 5pm   Weekends and holidays:    8am to noon (in general)  If your fever is 100.5  or greater,   call the clinic.  After hours call the   hospital at [phone] or   [phone]. Ask for the BMT   fellow on-call           Care Instructions    No instructions as of 6pm.           Follow-ups after your visit        Your next 10 appointments already scheduled     Sep 06, 2017  7:15 AM CDT   [facility] Lab Draw with  [facility] LAB DRAW   [facility] ([facility])    [address] [place] [address]   [phone]            Sep 06, 2017  8:00 AM CDT   (Arrive by 7:45 AM)   Return Visit with Kalpana Alvarado PA-C   [facility] ([facility])    [address] [place] [address]   [phone]            Sep 13, 2017  8:00 AM CDT   [facility] Lab Draw with  [facility] LAB DRAW   [facility] ([facility])    [address] [place] [address]   [phone]            Sep 13, 2017  8:30 AM CDT   Infusion 360 with UC ONCOLOGY INFUSION, UC 20 ATC   [facility] ([facility])    [address] [place] [address]   [phone]            Sep 15, 2017 10:00 AM CDT   (Arrive by 9:45 AM)   New Patient Visit with Pj Cunha MD     [facility] ([facility])    [address]  [address] [facility] [id number] [phone]            Sep 27, 2017  1:00 PM CDT   (Arrive by 12:45 PM)   Implanted Defibulator with Uc Cv Device 1   [facility] ([facility])    [address]  [address] [facility] [id number] [phone]            Sep 27, 2017  1:30 PM CDT   (Arrive by 1:15 PM)   RETURN ARRHYTHMIA with Juan Eaton MD   [facility] ([facility])    [address]  [address] [facility] [id number] [phone]              Who to contact     If you have questions or need follow up information about today's clinic visit or your schedule please contact [facility] directly at [phone].  Normal or non-critical lab and imaging results will be communicated to you by MyChart, letter or phone within 4 business days after the clinic has received the results. If you do not hear from us within 7 days, please contact the clinic through [employer] or phone. If you have a critical or abnormal lab result, we will notify you by phone as soon as possible.  Submit refill requests through [employer] or call your pharmacy and they will forward the refill request to us. Please allow 3 business days for your refill to be completed.          Additional Information About Your Visit        [employer] Information     [employer] gives you secure access to your electronic health record. If you see a primary care provider, you can also send messages to your care team and make appointments. If you have questions, please call your primary care clinic.  If you do not have a primary care provider, please call [phone] and they will assist you.        Care EveryWhere ID     This is your Care EveryWhere ID. This could be used by other organizations to access your [place] medical records  [id number]         Blood Pressure from  Last 3 Encounters:   08/24/17 143/72   08/23/17 130/67   08/21/17 145/65    Weight from Last 3 Encounters:   08/24/17 55.7 kg (122 lb 11.2 oz)   08/21/17 54.9 kg (121 lb)   08/18/17 54.5 kg (120 lb 3.2 oz)              We Performed the Following     CSF Culture Aerobic Bacterial     Gram stain          Today's Medication Changes      Notice     This visit is during an admission. Changes to the med list made in this visit will be reflected in the After Visit Summary of the admission.             Recent Review Flowsheet Data     BMT Recent Results Latest Ref Rng & Units 8/11/2017 8/12/2017 8/14/2017 8/16/2017 8/18/2017 8/21/2017 8/24/2017    WBC 4.0 - 11.0 10e9/L 0.5(LL) 1.2(L) 3.9(L) 5.3 3.7(L) 4.7 3.1(L)    Hemoglobin 11.7 - 15.7 g/dL 8.1(L) 8.0(L) 10.3(L) 9.8(L) 9.7(L) 9.5(L) 9.4(L)    Platelet Count 150 - 450 10e9/L 17(LL) 17(LL) 33(LL) 49(LL) 66(L) 104(L) 130(L)    Platelets 150 - 450 10:9/L - - - - - - -    Neutrophils (Absolute) 1.6 - 8.3 10e9/L - 0.9(L) 3.1 4.1 - 3.1 1.6    INR 0.86 - 1.14 - - - - - - -    Sodium 133 - 144 mmol/L 137 138 140 142 143 140 141    Potassium 3.4 - 5.3 mmol/L 4.0 3.9 3.8 3.9 3.8 4.2 3.9    Chloride 94 - 109 mmol/L 106 108 108 110(H) 108 105 106    Glucose 70 - 99 mg/dL 74 71 78 83 84 131(H) 103(H)    Urea Nitrogen 7 - 30 mg/dL 20 17 13 12 18 24 17    Creatinine 0.52 - 1.04 mg/dL 0.44(L) 0.42(L) 0.46(L) 0.43(L) 0.46(L) 0.57 0.53    Calcium (Total) 8.5 - 10.1 mg/dL 8.8 9.0 8.8 8.8 9.2 9.7 9.0    Protein (Total) 6.8 - 8.8 g/dL 5.9(L) 5.9(L) 6.7(L) 6.5(L) 6.6(L) 7.1 6.8    Albumin 3.4 - 5.0 g/dL 2.9(L) 3.1(L) 3.3(L) 3.3(L) 3.4 3.6 3.6    Bilirubin (Direct) 0.0 - 0.2 mg/dL - - - 0.3(H) 0.3(H) - -    Alkaline Phosphatase 40 - 150 U/L 154(H) 169(H) 194(H) 180(H) 162(H) 160(H) 136    AST 0 - 45 U/L 40 64(H) 46(H) 40 42 52(H) 46(H)    ALT 0 - 50 U/L 72(H) 103(H) 107(H) 87(H) 83(H) 86(H) 74(H)    MCV 78 - 100 fl 88 90 91 93 95 98 100               Primary Care Provider Office Phone # Fax #     Comfort Sabillon [phone]       [id number] [address] [facility] [address]        Equal Access to Services     CATINA HEAD : Hadii aad ku hadjuanitaalbert Flores, jaironda mitchlettyha, krystle nickdelbert franckcindy, durga inman. So [facility] [phone].    ATENCIÓN: Si habla español, tiene a sarmiento disposición servicios gratuitos de asistencia lingüística. Llame al [phone].    We comply with applicable federal civil rights laws and [place] laws. We do not discriminate on the basis of race, color, national origin, age, disability sex, sexual orientation or gender identity.            Thank you!     Thank you for choosing [facility] BLOOD [facility]ARR[facility]  for your care. Our goal is always to provide you with excellent care. Hearing back from our patients is one way we can continue to improve our services. Please take a few minutes to complete the written survey that you may receive in the mail after your visit with us. Thank you!             Your Updated Medication List - Protect others around you: Learn how to safely use, store and throw away your medicines at www.disposemymeds.org.      Notice     This visit is during an admission. Changes to the med list made in this visit will be reflected in the After Visit Summary of the admission.

## 2017-08-16 NOTE — CONSULTS
Health Psychology                  Clinic    Department of Medicine  Edilma Miguel, Ph.D., L.P. [phone]                          Clinics and Surgery Center  [facility] Yaa Buchanan, Ph.D.,  L.P. [phone]                 3rd Floor  [place] Mail Code 741   Gaurav Foster, Ph.D., LYNDA., L.P. [phone]     [address] [place] [address] Ana Barillas, Ph.D., L.P. [phone]            [id number]  [place] [address]           Luci Torres, Ph.D., L.P. [phone]     Inpatient Health Psychology *    DATE OF SERVICE:  08/16/2017      REFERRAL SOURCE:  CRISTA Emerson, Acute Rehabilitation Center, [facility], [place].     Clinical Information:  Amelia Michel is a 56-year-old woman who has had an extensive and complex course of illness and hospitalization since May of this year.  Briefly, Ms. Michel has a complex medical history including prior VSD repair, rheumatoid arthritis on longterm methotrexate, recent admission to [facility] on 05/29/2017 after an out-of-hospital cardiac arrest complicated by ECMO and intubation with discharge to the [facility].  She was readmitted to the Cardiology Service and ultimately diagnosed with diffuse large B-cell lymphoma.  Ms. Michel has experienced some understandable reactions of anxiety and depression since that diagnosis.  She has benefitted from emotional support through palliative care social work.  She was appreciative of contact with health psychology last week when we met on the [facility].  Today Ms Michel reports that her level of anxiety is lower, but that she continues to be anxious about going home and what it will be like, given that she has been hospitalized since late May. Her  has more adaptations to the house that he wants to complete. Ms Michel is reporting anxiety specifically about her ability to maintain bladder continence as her brand  was only removed earlier today. Both she and her  report that they feel less anxious in general having met Dr Rodriguez and had a conversation with her earlier today about treatment planning, which now feels concrete and clear to them. She continues to have some difficulty with sleep, reporting that anxiety about discharge is playing some role, but that she knows that once she is home and in a routine that this will dissipate. We also talked about resources that could assist with some of the concerns, such as ordering groceries to be delivered on a temporary basis.  Provided supportive listening and validated their concerns. Assisted them in gaining some perspective on the anxieties about the unknowns that remain and worked together to generate strategies to assist in reducing any remaining anxiety.   Assessment: Ms Michel is doing quite well emotionally given the length of her hospitalization and multiple and difficult events over the time she has been in hospital.  continues to appear even more anxious than patient, which also appears to be their baseline. She is feeling quite a bit more confident about treatment for lymphoma after more contact with Dr Rodriguez and clarification of treatment plan.  Diagnosis:  Adjustment disorder with mixed anxiety and depressed mood (F43.23).   Recommendations/Plan: I suggested that they ask for orders to cover any equipment that they feel they may need (e.g., BSC), as they will be able to assess the need when they get home and may choose to not fill the order. No specific orders from a psychological perspective prior to discharge.  Time Spent with Patient: 30 minutes  Service Provided: Individual psychotherapy   Provider: Evelin Costello, Ph.D, LP   Provider Pager: [phone]   Provider Phone: [phone]       REASON FOR REFERRAL:           EVELIN COSTELLO, PHD, LP         *In accordance with the Rules of the [place] [employer], it is noted that psychological  descriptions and scientific procedures underlying psychological evaluations have limitations.  Absolute predictions cannot be made based on information in this report.

## 2017-08-16 NOTE — PROGRESS NOTES
"  Developed rash over upper chest wall and arms and upper thigh area  Non itching   Developed similar rash when in hospital  No sob or chest pressure   On Exam ;   Alert and oriented . No acute distress  Vital signs:  Temp: 96.8  F (36  C) Temp src: Axillary BP: 124/52 Pulse: 81   Resp: 16 SpO2: 99 % O2 Device: None (Room air)   Height: 147.3 cm (4' 10\") Weight: 54.9 kg (121 lb)  Estimated body mass index is 25.29 kg/(m^2) as calculated from the following:    Height as of this encounter: 1.473 m (4' 10\").    Weight as of this encounter: 54.9 kg (121 lb).    Neck ; supple , no JVD  Chest ; equal BS bilaterally , no rales or rhonchi   CVS; RRR, no murmur /rubs or gallops  GI ; soft abdomen, non tender, BS positive  Ext ;  edema , no cynosis  Neuro ; CN 2 to 12 grossly intact , No Facial asymmetry noticed  .  Psych ; appropriate mood and effect  Labs; wbc 5.3, hb 9.8, plt 49 . anc 4.1, bmp reviewed alk ph 180 , alt 87 , ast 40 on 8/16  A/P  Strep Mitis bacteremia ; ct ceftriaxone thru 8/23 and then switch to po levaquin 250 mg daily   Parotitis ; improved . Conservative measures  Afib /flutter ; on atenolol and dig. No anticoagulation due to low plt  Hyperuricemia ; allopurinol  RA ; on 5 mg prednisone   Left upper ext wound ; wound cares   Urinary incontinence and saddle anesthesia ; patient plans to follow up with urology about studies as an out patient  Huynh removed  Abnormal LFTs ; thought to be due to lymphoma . Monitor   Spoke with PMR 8/17 and hematology team on 8/15  Diffuse large B cell lymphoma ; dc neupogen. Hb above 8 and plt above 10 ( goal )  Rash : on zyrtec and hydrocortisone creme . Likely contact ?  Improved per patient  Dispo ; per PMR , likely this Saturday with IV ceftriaxone             "

## 2017-08-16 NOTE — PLAN OF CARE
"Problem: Goal/Outcome  Goal: Goal Outcome Summary  FOCUS/GOAL  Bowel management, Bladder management, Mobility and Skin integrity     ASSESSMENT, INTERVENTIONS AND CONTINUING PLAN FOR GOAL:  Alert and oriented, VSS. Uses call light appropriately, able to make her needs known. Denies c/o pain, shortness of breath, or nausea/vomitting. Brand catheter intact draining clear yellow urine, brand cares provided. Per pt's report, has had x5 BMs today. Describes as \"not loose, just small and mushy\". Refused HS Senokot. SBA with walker for transfers/ambulation. Hydrocortisone cream applied to rash as ordered. PICC line intact, without s/s of infection noted, dressing C/D/I. Ceftriaxone infuses without difficulty. Had a shower this evening with NA assistance. Will continue to monitor.           "

## 2017-08-16 NOTE — PROGRESS NOTES
"  [facility]   Acute Rehabilitation Unit  Daily progress note    interval history  Amleia Michel \"Bree\" seen walking down [address] and later in room is feeling upbeat and well, no n/v/ having soft stool no fevers, no sob, no pain.  Rash has extended some but remains non pruritic, no sob, no cough, no nasal drainage.  Huynh removed today and is voiding small amounts with timed voiding and very low PVR thus far.    See care conference by Dr. Josue for further details.      medications    cetirizine  10 mg Oral Daily     hydrocortisone   Topical BID     lactobacillus rhamnosus (GG)  1 capsule Oral BID     heparin lock flush  5-10 mL Intracatheter Q24H     acyclovir  400 mg Oral BID     allopurinol  300 mg Oral Daily     ascorbic acid  500 mg Oral Daily     atenolol  25 mg Oral BID     calcium-vitamin D  2 tablet Oral QAM     cefTRIAXone  1 g Intravenous Daily     digoxin  125 mcg Oral Daily     fluconazole  200 mg Oral Daily     folic acid  1 mg Oral Daily     gabapentin  200 mg Oral TID     [START ON 8/23/2017] levofloxacin  250 mg Oral Daily     lidocaine visc 2% & diphenhydramine 12.5mg/5mL & maalox/mylanta w/simethicone (1:1:1 v/v/v)  10 mL Swish & Spit 4x Daily AC & HS     multivitamin, therapeutic with minerals  1 tablet Oral Daily     potassium chloride SA  20 mEq Oral Daily     predniSONE  5 mg Oral Daily     thiamine  50 mg Oral Daily        diphenhydrAMINE, sodium chloride (PF), heparin lock flush, acetaminophen, cetirizine, melatonin, traMADol, - MEDICATION INSTRUCTIONS -, naloxone     physical exam  /53 (BP Location: Left leg)  Pulse 65  Temp 96.2  F (35.7  C) (Axillary)  Resp 18  Ht 1.473 m (4' 10\")  Wt 54.9 kg (121 lb)  SpO2 96%  BMI 25.29 kg/m2  Gen: alert feeling well NAD  Cardio: rrr,+ murmur  Pulm: non labored lungs clear   Abd: soft non tender +BS  Ext: warm dry without edema  Neuro/MSK: awake alert moves all extremities. Up ambulating with therapy, "     labs  Lab Results   Component Value Date    WBC 5.3 08/16/2017     Lab Results   Component Value Date    RBC 3.26 08/16/2017     Lab Results   Component Value Date    HGB 9.8 08/16/2017     Lab Results   Component Value Date    HCT 30.4 08/16/2017     Lab Results   Component Value Date    MCV 93 08/16/2017     Lab Results   Component Value Date    MCH 30.1 08/16/2017     Lab Results   Component Value Date    MCHC 32.2 08/16/2017     Lab Results   Component Value Date    RDW 22.3 08/16/2017     Lab Results   Component Value Date    PLT 49 08/16/2017         Rehabilitation - continue comprehensive acute inpatient rehabilitation program with multidisciplinary approach including therapies, rehab nursing, and physiatry following. See interval history for updates.      assessment and plan    Ms. Amelia Michel is a 56-year-old woman with a history of rheumatoid arthritis, previous cardiac arrest, atrial fibrillation/flutter (not on anticoagulation due to thrombocytopenia), recently diagnosed DLBCL, who was initially transferred from acute rehab to Hematology/Oncology Service on 08/08/2017.  On 08/12/2017  found to have worsening neutropenic fever and positive blood cultures.  Was admitted to [facility] 8/8-8/12, then readmitted to ARU 8/12 for ongoing rehabilitation, due to impaired activity tolerance, and strength.     Strep mitis bacteremia likely cause of neutropenic fever noted 8/7.  BCx + on 8/7/17 (positive in PICC & peripheral).  Remainder of neutropenic fever work up negative.  Blood cultures from 8/8 -8/11 negative.  Transitioned to Ceftriaxone to complete total of 14 day course antibiotics (through 8/23).  -continue ceftriaxone  - Resume Levaquin 250mg following completion of ceftriaxone.         A fib/flutter.  Rate controlled. Tachycardic overnight on 8/9. s/p 20 mg diltiazem.  Atenolol dose increased. Continue PTA dig/atenolol (at increased BID dose).  No anticoagulation 2/2 to thrombocytopenia.  -continue  digoxin 125 mcg daily  -continue atenolol 25 mg bid      OSH cardiac arrest. 5/29/17.  Etiology unclear possibly r/t AV prieto blocking agents.  Meets critieria for secondary prevention ICD but placement is on hold r/t LUE extravasation wound & tx. Of lymphoma.  EP discussed risk vs. Benefit of Life Vest patient defer.    -Plan for f/u in cardiology with [facility] in 2-3 mo.     Diffuse Large B Cell Lymphoma.  Stage IV with BM & liver involvement.  S/P cycle 1 DA-R-EPOCH D1 7/28/17 tolerated well however with Rituxan had SOB/hypotension.  Was started on daily neupogen  8/2.    -continue prophy acyclovir,  fluconazole (levofloxacin to resume after completion of ceftriaxone)   - Patient will switch to [facility] for next cycle as not a [employer] (per Jennifer/Susana)  -phone call with Dr. Rodriguez today, with plan for follow up oncology with LP 8/21 vs 8/22.       Pancytopenia 2/2 to chemotherapy & underlying DLBCL with BM involvement. (improved) no longer neutropenic.  8/16 Ongoing Anemia- hgb 9.8, Thrombocytopenia PLT 49.     - Maintained on transfusion parameters to keep Hgb > 8 and PLT > 10.  -continue to trend CBC     Generalized Rash- first noted 8/15 on left arm, pink pin point now coalesced patches slightly raised  non pruritic, most consistent with allergic vs contact dermatitis, reportedly had similar rash in past for which she took zyrtec and steriod cream.  No sob, no itchy watery eyes, no nasal drainage.   -monitor  -start zyrtec (as used in past and rash resolved)  -continue hydrocortisone  -appreciate eval/recs from hospitalist team.     H/O saddle anethesia/urinary incontinence.   - Urodynamic study as outpatient.   -discontinue brand voiding trials and PVRs- encourage q 2-3 hour voiding while awake may wear depends at night.   - follow up with urology as outpatient.      Mucositis. (improved) L lateral tongue, lower gum line, R upper buccal mucosa 2/2 neutropenia from recent chemotherapy.   -Continue MMW.       Hyperuricemia-  Continue allopurinol.      LUE extravasation-  wound 2/2 OSH cardiac arrest.  Slowly improving    - continue WOCN   -continue wound care every other day per orders    Rheumatoid arthritis. History of seropositive rheumatoid arthritis (anti-CCP positive) since late 1980;s w/ multiple MTP osteotomies, partial right wrist fusion, longstanding therapy consisting of MTX, prednisone and HCQ.   -Continue 5 mg PO prednisone.  - Follow-up with [facility] clinic Dr. Conor Cunha in 4-6 weeks after discharge         LFT derangement 2/2 to lymphoma involvement of liver. Expect LFTs to ultimately improve with treatment, but may fluctuate in the meantime. 8/16 stable/ down trending. , ALT 87, AST 40, Bilirubin direct 0.3  -continue to monitor      Anasarca/volume overload. resolved.       Parotitis (resolved)  identified on clinical exam & also on CT (no evidence of abscess).  Mumps PCR negative.     Aortic masses on BRE.  Not endocarditis.  Lymphoma (?).  Continue chemotherapy.   -Ongoing follow up per onc.       Patient seen and discussed with Dr. Josue  PM&R Staff Physician    Flory Agarwal PA-C  Rehab Service  Pager: [phone]

## 2017-08-16 NOTE — CARE CONFERENCE
"Acute Rehab Care Conference/Team Rounds      Type: Patient Conference    Present: Dr Regino Josue, [facility], Amelia Michel patient, Luz Chambers OT, Lali Boothe PT, Ana Watson RN.       Discharge Barriers/Treatment/Education    Rehab Diagnosis: 56-year-old woman with a history of rheumatoid arthritis, previous cardiac arrest, atrial fibrillation/flutter (not on anticoagulation due to thrombocytopenia), recently diagnosed DLBCL, who was initially transferred from acute rehab to Hematology/Oncology Service on 08/08/2017.  On 08/12/2017  found to have worsening neutropenic fever and positive blood cultures.  Was admitted to [facility] 8/8-8/12, then readmitted to ARU 8/12 for ongoing rehabilitation, due to impaired activity tolerance, and strength.    Active Medical Co-morbidities/Prognosis: see HPI and all notes.    Safety: [place] with 4 wheel walker.    Pain:Denies pain    Medications, Skin, Tubes/Lines:Takes meds whole with oversight, brand patent and draining clear urine, will dc brand at about 0600. Foam dressing intact on left upper arm, right great toe scabbed and open to air, also right posterior ankle scabbed and open to air    Swallowing/Nutrition:    Bowel/Bladder:Had episodes of both continent and incontinence of bowel, currently has a brand catheter. LB 8/15.    Psychosocial: Pt resides with her . Goal to return home with continued support from family. Team working towards a safe d/c plan.     ADLs/IADLs: Patient progressing well with OT goals, mod I for dressing, bathing, grooming. Patient upgraded to mod I in the room with use of 4WW- mod I with bed mobility, chair transfer and toilet transfers. Patient continues with decreased activity tolerance with activity. Patient will have no further OT goals at time of d/c, recommend continuing with HEP.     Mobility: Pt is modified independent in room.  Mod I with bed mobility, SBA for transfers and ambulation. Pt has demonstrated 4 6\" steps x 2 " with B rails and CGA.  Pt has ambulated 240 ft without rest, today ambulated ~600 ft with intermittent rest for energy conservation.  Will continue to progress independence with stairs, increase endurance with ambulation, and educate on falls prevention.  Recommend pt received home health care PT and uses a 4WW for ambulation.      Cognition/Language:    Community Re-Entry: use of 4WW and possible transport w/c for community distances     Transportation: recommend assist with driving due to decreased activity tolerance; car transfer not a barrier.     Decision maker: self    Plan of Care and goals reviewed and updated.    Discharge Plan/Recommendations    Fall Precautions: continue    Patient/Family input to goals: okay with rehab plan    Anticipated rehab needs following discharge: home RN,PT, and home infusion.    Anticipated care giver support after discharge:     Estimated length of stay: Saturday 08/19/2017. At which time home will be ready for patient.  will have stairs in place so that patient can safely get into the home.    Overall plan for the patient: as above.      Utilization Review and Continued Stay Justification    Medical Necessity Criteria:    For any criteria that is not met, please document reason and plan for discharge, transfer, or modification of plan of care to address.    Requires intensive rehabilitation program to treat functional deficits?: Yes    Requires 3x per week or greater involvement of rehabilitation physician to oversee rehabilitation program?: Yes    Requires rehabilitation nursing interventions?: Yes    Patient is making functional progress?: Yes    There is a potential for additional functional progress? Yes    Patient is participating in therapy 3 hours per day a minimum of 5 days per week or 15 hours per week in 7 day period?:Yes    Has discharge needs that require coordinated discharge planning approach?:Yes      Barriers/Concerns related to meeting medical  necessity criteria:  none    Team Plan to Address Concern:  na      Final Physician Sign off    Statement of Approval: reviewed and approved. Regino Josue      Patient Goals    OT target date for goal attainment: 08/18/17  OT Frequency:  (daily)  OT goal: hygiene/grooming: modified independent  OT goal: upper body dressing: Modified independent  OT goal: lower body dressing: Modified independent  OT goal: upper body bathing: Modified independent  OT goal: lower body bathing: Modified independent  OT goal: toilet transfer/toileting: Modified independent  OT goal: meal preparation: Modified independent  OT goal: home management: Modified independent  OT goal: cognitive: Patient/caregiver will verbalize understanding of cognitive assessment results/recommendations as needed for safe discharge planning    PT target date for goal attainment: 08/21/17  PT Frequency: 2x/day for  mins  PT goal: bed mobility: Independent  PT goal: transfers: Modified independent, Assistive device  PT goal: gait: Modified independent, Greater than 200 feet, Rolling walker  PT goal: stairs: Supervision/stand-by assist, 4 stairs (No railings)  PT goal 1: Pt will be Ind with HEP in order to continue to progress between and after therapies  PT Goal 2: Pt will be able to demonstrate gait speed >.8 m/s to indicate decreased falls risk.  PT Goal 3: Pt will be able to tolerate 6 MWT in order to show improved activity tolerance.

## 2017-08-17 LAB
ABO + RH BLD: NORMAL
ABO + RH BLD: NORMAL
BACTERIA SPEC CULT: NO GROWTH
BACTERIA SPEC CULT: NORMAL
BLD GP AB SCN SERPL QL: NORMAL
BLOOD BANK CMNT PATIENT-IMP: NORMAL
GRAM STN SPEC: NORMAL
SPECIMEN EXP DATE BLD: NORMAL
SPECIMEN SOURCE: NORMAL

## 2017-08-17 PROCEDURE — 25000132 ZZH RX MED GY IP 250 OP 250 PS 637: Performed by: PHYSICIAN ASSISTANT

## 2017-08-17 PROCEDURE — 12800006 ZZH R&B REHAB

## 2017-08-17 PROCEDURE — 40000133 ZZH STATISTIC OT WARD VISIT

## 2017-08-17 PROCEDURE — 25000132 ZZH RX MED GY IP 250 OP 250 PS 637: Performed by: INTERNAL MEDICINE

## 2017-08-17 PROCEDURE — 97535 SELF CARE MNGMENT TRAINING: CPT | Mod: GO

## 2017-08-17 PROCEDURE — 25000125 ZZHC RX 250: Performed by: INTERNAL MEDICINE

## 2017-08-17 PROCEDURE — 97116 GAIT TRAINING THERAPY: CPT | Mod: GP

## 2017-08-17 PROCEDURE — 25000128 H RX IP 250 OP 636: Performed by: PHYSICAL MEDICINE & REHABILITATION

## 2017-08-17 PROCEDURE — 25000128 H RX IP 250 OP 636: Performed by: INTERNAL MEDICINE

## 2017-08-17 PROCEDURE — 99232 SBSQ HOSP IP/OBS MODERATE 35: CPT | Performed by: INTERNAL MEDICINE

## 2017-08-17 PROCEDURE — 40000193 ZZH STATISTIC PT WARD VISIT: Performed by: PHYSICAL THERAPIST

## 2017-08-17 PROCEDURE — 36592 COLLECT BLOOD FROM PICC: CPT | Performed by: PHYSICIAN ASSISTANT

## 2017-08-17 PROCEDURE — 97110 THERAPEUTIC EXERCISES: CPT | Mod: GP | Performed by: PHYSICAL THERAPIST

## 2017-08-17 PROCEDURE — 97116 GAIT TRAINING THERAPY: CPT | Mod: GP | Performed by: PHYSICAL THERAPIST

## 2017-08-17 PROCEDURE — 86850 RBC ANTIBODY SCREEN: CPT | Performed by: PHYSICIAN ASSISTANT

## 2017-08-17 PROCEDURE — 40000193 ZZH STATISTIC PT WARD VISIT

## 2017-08-17 PROCEDURE — 86900 BLOOD TYPING SEROLOGIC ABO: CPT | Performed by: PHYSICIAN ASSISTANT

## 2017-08-17 PROCEDURE — 97112 NEUROMUSCULAR REEDUCATION: CPT | Mod: GP

## 2017-08-17 PROCEDURE — 86901 BLOOD TYPING SEROLOGIC RH(D): CPT | Performed by: PHYSICIAN ASSISTANT

## 2017-08-17 RX ORDER — TRIAMCINOLONE ACETONIDE 5 MG/G
OINTMENT TOPICAL 3 TIMES DAILY
Status: DISCONTINUED | OUTPATIENT
Start: 2017-08-17 | End: 2017-08-19 | Stop reason: HOSPADM

## 2017-08-17 RX ADMIN — DIPHENHYDRAMINE HYDROCHLORIDE AND LIDOCAINE HYDROCHLORIDE AND ALUMINUM HYDROXIDE AND MAGNESIUM HYDRO 10 ML: KIT at 08:52

## 2017-08-17 RX ADMIN — ALLOPURINOL 300 MG: 300 TABLET ORAL at 08:49

## 2017-08-17 RX ADMIN — GABAPENTIN 200 MG: 100 CAPSULE ORAL at 22:20

## 2017-08-17 RX ADMIN — ATENOLOL 25 MG: 25 TABLET ORAL at 08:48

## 2017-08-17 RX ADMIN — OXYCODONE HYDROCHLORIDE AND ACETAMINOPHEN 500 MG: 500 TABLET ORAL at 08:48

## 2017-08-17 RX ADMIN — Medication 1 CAPSULE: at 22:20

## 2017-08-17 RX ADMIN — FLUCONAZOLE 200 MG: 200 TABLET ORAL at 08:48

## 2017-08-17 RX ADMIN — DIGOXIN 125 MCG: 125 TABLET ORAL at 08:47

## 2017-08-17 RX ADMIN — CETIRIZINE HYDROCHLORIDE 10 MG: 10 TABLET, FILM COATED ORAL at 08:47

## 2017-08-17 RX ADMIN — GABAPENTIN 200 MG: 100 CAPSULE ORAL at 13:54

## 2017-08-17 RX ADMIN — TRIAMCINOLONE ACETONIDE: 5 OINTMENT TOPICAL at 10:40

## 2017-08-17 RX ADMIN — GABAPENTIN 200 MG: 100 CAPSULE ORAL at 08:48

## 2017-08-17 RX ADMIN — CALCIUM CARBONATE 600 MG (1,500 MG)-VITAMIN D3 400 UNIT TABLET 2 TABLET: at 08:48

## 2017-08-17 RX ADMIN — ATENOLOL 25 MG: 25 TABLET ORAL at 22:21

## 2017-08-17 RX ADMIN — TRIAMCINOLONE ACETONIDE: 5 OINTMENT TOPICAL at 22:25

## 2017-08-17 RX ADMIN — THIAMINE HCL (VITAMIN B1) 50 MG TABLET 50 MG: at 08:48

## 2017-08-17 RX ADMIN — ACYCLOVIR 400 MG: 400 TABLET ORAL at 08:48

## 2017-08-17 RX ADMIN — PREDNISONE 5 MG: 5 TABLET ORAL at 08:49

## 2017-08-17 RX ADMIN — TRIAMCINOLONE ACETONIDE: 5 OINTMENT TOPICAL at 15:03

## 2017-08-17 RX ADMIN — SODIUM CHLORIDE, PRESERVATIVE FREE 5 ML: 5 INJECTION INTRAVENOUS at 06:56

## 2017-08-17 RX ADMIN — FOLIC ACID 1 MG: 1 TABLET ORAL at 08:48

## 2017-08-17 RX ADMIN — MULTIPLE VITAMINS W/ MINERALS TAB 1 TABLET: TAB at 08:49

## 2017-08-17 RX ADMIN — SODIUM CHLORIDE, PRESERVATIVE FREE 5 ML: 5 INJECTION INTRAVENOUS at 13:56

## 2017-08-17 RX ADMIN — CEFTRIAXONE SODIUM 1 G: 1 INJECTION, POWDER, FOR SOLUTION INTRAMUSCULAR; INTRAVENOUS at 13:11

## 2017-08-17 RX ADMIN — DIPHENHYDRAMINE HYDROCHLORIDE AND LIDOCAINE HYDROCHLORIDE AND ALUMINUM HYDROXIDE AND MAGNESIUM HYDRO 10 ML: KIT at 22:20

## 2017-08-17 RX ADMIN — POTASSIUM CHLORIDE 20 MEQ: 750 TABLET, FILM COATED, EXTENDED RELEASE ORAL at 08:48

## 2017-08-17 RX ADMIN — DIPHENHYDRAMINE HYDROCHLORIDE AND LIDOCAINE HYDROCHLORIDE AND ALUMINUM HYDROXIDE AND MAGNESIUM HYDRO 10 ML: KIT at 16:58

## 2017-08-17 RX ADMIN — Medication 1 CAPSULE: at 08:47

## 2017-08-17 RX ADMIN — ACYCLOVIR 400 MG: 400 TABLET ORAL at 22:21

## 2017-08-17 RX ADMIN — SODIUM CHLORIDE, PRESERVATIVE FREE 5 ML: 5 INJECTION INTRAVENOUS at 08:58

## 2017-08-17 NOTE — PLAN OF CARE
"Problem: Goal/Outcome  Goal: Goal Outcome Summary  Outcome: No Change  FOCUS/GOAL  Bowel management, Bladder management, Wound care management and Medical management     ASSESSMENT, INTERVENTIONS AND CONTINUING PLAN FOR GOAL:  Pt continent and incontinent of bladder this evening. She is experiencing frequency after brand removal with AM shift. PVRs have remained low this shift. Continue to monitor bladder status. Pt's L arm wound dressing changed. Educated pt on dressing change and s/sx of infection. Pt verbalized understanding and stated, \"I've got it down.\" Denies pain at this time. Continue with POC.           "

## 2017-08-17 NOTE — PLAN OF CARE
Problem: Goal/Outcome  Goal: Goal Outcome Summary  Outcome: Improving  FOCUS/GOAL  Bladder management and Mobility     ASSESSMENT, INTERVENTIONS AND CONTINUING PLAN FOR GOAL:  Pt has appeared to be sleeping well, and is independent with bed mobility. Pt has been up to use the BR independently, and is voiding without difficulty, and has been continent of urine. PVRs are completed, and all have been <45 ccs.

## 2017-08-17 NOTE — PLAN OF CARE
Problem: Goal/Outcome  Goal: Goal Outcome Summary  FOCUS/GOAL  Bowel management, Bladder management, Mobility and Skin integrity     ASSESSMENT, INTERVENTIONS AND CONTINUING PLAN FOR GOAL:  Pt is alert and oriented, VSS. Denies c/o pain, shortness of breath, or nausea/vomitting. Mod I in room with walker without difficulty. No difficulties with voiding noted, last BM 8/17/17. Hydrocortisone cream DC'd today, and triamcinolone cream to rash TID ordered with improvement of rash noted after administration. PICC line intact, no s/s of infection noted, dressing C/D/I. Will continue to monitor.

## 2017-08-17 NOTE — PROGRESS NOTES
"  [facility]   Acute Rehabilitation Unit  Daily progress note    interval history  Amelia Michel \"Bree\" seen up walking to shower with OT, then walking down [address] and in gym with PT.  Reports she is doing well, rash stable no new sob, nasal congestion, cough, fever, or n/v, having frequent urination without retention, consistent with prior to admission baseline.  Asks about timing of chemo, and asked that ceftriaxone can be moved up in day prior to discharge which was done.  Independence day 8/18 planning for d/c home with support of  Wolf 8/19.     medications    triamcinolone   Topical TID     cetirizine  10 mg Oral Daily     lactobacillus rhamnosus (GG)  1 capsule Oral BID     heparin lock flush  5-10 mL Intracatheter Q24H     acyclovir  400 mg Oral BID     allopurinol  300 mg Oral Daily     ascorbic acid  500 mg Oral Daily     atenolol  25 mg Oral BID     calcium-vitamin D  2 tablet Oral QAM     cefTRIAXone  1 g Intravenous Daily     digoxin  125 mcg Oral Daily     fluconazole  200 mg Oral Daily     folic acid  1 mg Oral Daily     gabapentin  200 mg Oral TID     [START ON 8/23/2017] levofloxacin  250 mg Oral Daily     lidocaine visc 2% & diphenhydramine 12.5mg/5mL & maalox/mylanta w/simethicone (1:1:1 v/v/v)  10 mL Swish & Spit 4x Daily AC & HS     multivitamin, therapeutic with minerals  1 tablet Oral Daily     potassium chloride SA  20 mEq Oral Daily     predniSONE  5 mg Oral Daily     thiamine  50 mg Oral Daily        diphenhydrAMINE, sodium chloride (PF), heparin lock flush, acetaminophen, cetirizine, melatonin, traMADol, - MEDICATION INSTRUCTIONS -, naloxone     physical exam  /57 (BP Location: Left leg)  Pulse 65  Temp 95.9  F (35.5  C) (Oral)  Resp 16  Ht 1.473 m (4' 10\")  Wt 54.9 kg (121 lb)  SpO2 99%  BMI 25.29 kg/m2  Gen: alert feeling well NAD  Cardio: rrr,+ murmur  Pulm: non labored lungs clear   Abd: soft non tender +BS  Ext: warm dry " without edema  Neuro/MSK: awake alert moves all extremities. Up ambulating with therapy down whitaker with FWW gait stable moving well    labs  No new labs      Rehabilitation - continue comprehensive acute inpatient rehabilitation program with multidisciplinary approach including therapies, rehab nursing, and physiatry following. See interval history for updates.      assessment and plan    Ms. Amelia Michel is a 56-year-old woman with a history of rheumatoid arthritis, previous cardiac arrest, atrial fibrillation/flutter (not on anticoagulation due to thrombocytopenia), recently diagnosed DLBCL, who was initially transferred from acute rehab to Hematology/Oncology Service on 08/08/2017.  On 08/12/2017  found to have worsening neutropenic fever and positive blood cultures.  Was admitted to [facility] 8/8-8/12, then readmitted to ARU 8/12 for ongoing rehabilitation, due to impaired activity tolerance, and strength.     Strep mitis bacteremia likely cause of neutropenic fever noted 8/7.  BCx + on 8/7/17 (positive in PICC & peripheral).  Remainder of neutropenic fever work up negative.  Blood cultures from 8/8 -8/11 negative.  Transitioned to Ceftriaxone to complete total of 14 day course antibiotics (through 8/23).  -continue ceftriaxone-  to go to [facility] for infusion tomorrow and plan for d/c home 8/19.   - Resume Levaquin 250mg following completion of ceftriaxone.(start 824)         A fib/flutter.  Rate controlled. Tachycardic overnight on 8/9. s/p 20 mg diltiazem.  Atenolol dose increased. Continue PTA dig/atenolol (at increased BID dose).  No anticoagulation 2/2 to thrombocytopenia.  -continue digoxin 125 mcg daily  -continue atenolol 25 mg bid      OSH cardiac arrest. 5/29/17.  Etiology unclear possibly r/t AV prieto blocking agents.  Meets critieria for secondary prevention ICD but placement is on hold r/t LUE extravasation wound & tx. Of lymphoma.  EP discussed risk vs. Benefit of Life Vest patient defer.    -Plan  for f/u in cardiology with [facility] in 2-3 mo.     Diffuse Large B Cell Lymphoma.  Stage IV with BM & liver involvement.  S/P cycle 1 DA-R-EPOCH D1 7/28/17 tolerated well however with Rituxan had SOB/hypotension.  Was started on daily neupogen  8/2.    -continue prophy acyclovir,  fluconazole (levofloxacin to resume after completion of ceftriaxone)   - Patient will switch to [facility] for next cycle as not a [employer] (per Jennifer/Susana)  -f/u oncology 8/21 with LP and follow up 8/24 for next chemo      Pancytopenia 2/2 to chemotherapy & underlying DLBCL with BM involvement. (improved) no longer neutropenic.  8/16 Ongoing Anemia- hgb 9.8, Thrombocytopenia PLT 49.     - Maintained on transfusion parameters to keep Hgb > 8 and PLT > 10.  -continue to trend CBC     Generalized Rash- first noted 8/15 on left arm, pink pin point now coalesced patches slightly raised  non pruritic, most consistent with allergic vs contact dermatitis, reportedly had similar rash in past for which she took zyrtec and steriod cream.  No sob, no itchy watery eyes, no nasal drainage.   -monitor  -continue zyrtec (as used in past and rash resolved)  -discontinue hydrocortisone- transition to kenalog   -appreciate eval/recs from hospitalist team.     H/O saddle anethesia/urinary incontinence.   - Urodynamic study as outpatient.   -discontinued brand 8/17 with low PVRS with q 2-3 hour timed voiding while awake- at baseline with frequency and incontinence no dysuria, urine pale yellow, non odorous may wear depends at night.   - follow up with urology as outpatient.      Mucositis. (improved) L lateral tongue, lower gum line, R upper buccal mucosa 2/2 neutropenia from recent chemotherapy.   -Continue MMW.      Hyperuricemia-  Continue allopurinol.      LUE extravasation-  wound 2/2 OSH cardiac arrest.  Slowly improving    - continue WOCN   -continue wound care every other day per orders    Rheumatoid arthritis. History of seropositive rheumatoid arthritis  (anti-CCP positive) since late 1980;s w/ multiple MTP osteotomies, partial right wrist fusion, longstanding therapy consisting of MTX, prednisone and HCQ.   -Continue 5 mg PO prednisone.  - Follow-up with [facility] Rheumatology clinic Dr. Conor Cunha in 4-6 weeks after discharge       LFT derangement 2/2 to lymphoma involvement of liver. Expect LFTs to ultimately improve with treatment, but may fluctuate in the meantime. 8/16 stable/ down trending. , ALT 87, AST 40, Bilirubin direct 0.3  -continue to monitor      Anasarca/volume overload. resolved.       Parotitis (resolved)  identified on clinical exam & also on CT (no evidence of abscess).  Mumps PCR negative.     Aortic masses on BRE.  Not endocarditis.  Lymphoma (?).  Continue chemotherapy.   -Ongoing follow up per onc.       Patient seen and discussed with Dr. Josue  PM&R Staff Physician, Dr. Cordova (hospitalist)    Flory Agarwal PA-C  Rehab Service  Pager: [phone]

## 2017-08-17 NOTE — PROGRESS NOTES
Mepilex/medi honey dressing supply order faxed to [facility] for discharge supplies to home.  Homecare agency aware of supplies ordered.  Continue discharge planning.

## 2017-08-18 LAB
ALBUMIN SERPL-MCNC: 3.4 G/DL (ref 3.4–5)
ALP SERPL-CCNC: 162 U/L (ref 40–150)
ALT SERPL W P-5'-P-CCNC: 83 U/L (ref 0–50)
ANION GAP SERPL CALCULATED.3IONS-SCNC: 10 MMOL/L (ref 3–14)
AST SERPL W P-5'-P-CCNC: 42 U/L (ref 0–45)
BACTERIA SPEC CULT: NO GROWTH
BILIRUB DIRECT SERPL-MCNC: 0.3 MG/DL (ref 0–0.2)
BILIRUB SERPL-MCNC: 0.6 MG/DL (ref 0.2–1.3)
BUN SERPL-MCNC: 18 MG/DL (ref 7–30)
CALCIUM SERPL-MCNC: 9.2 MG/DL (ref 8.5–10.1)
CHLORIDE SERPL-SCNC: 108 MMOL/L (ref 94–109)
CO2 SERPL-SCNC: 25 MMOL/L (ref 20–32)
CREAT SERPL-MCNC: 0.46 MG/DL (ref 0.52–1.04)
DIGOXIN SERPL-MCNC: 0.6 UG/L (ref 0.5–2)
ERYTHROCYTE [DISTWIDTH] IN BLOOD BY AUTOMATED COUNT: 22.5 % (ref 10–15)
GFR SERPL CREATININE-BSD FRML MDRD: >90 ML/MIN/1.7M2
GLUCOSE SERPL-MCNC: 84 MG/DL (ref 70–99)
HCT VFR BLD AUTO: 29.9 % (ref 35–47)
HGB BLD-MCNC: 9.7 G/DL (ref 11.7–15.7)
MCH RBC QN AUTO: 30.7 PG (ref 26.5–33)
MCHC RBC AUTO-ENTMCNC: 32.4 G/DL (ref 31.5–36.5)
MCV RBC AUTO: 95 FL (ref 78–100)
PLATELET # BLD AUTO: 66 10E9/L (ref 150–450)
POTASSIUM SERPL-SCNC: 3.8 MMOL/L (ref 3.4–5.3)
PROT SERPL-MCNC: 6.6 G/DL (ref 6.8–8.8)
RBC # BLD AUTO: 3.16 10E12/L (ref 3.8–5.2)
SODIUM SERPL-SCNC: 143 MMOL/L (ref 133–144)
SPECIMEN SOURCE: NORMAL
WBC # BLD AUTO: 3.7 10E9/L (ref 4–11)

## 2017-08-18 PROCEDURE — 40000193 ZZH STATISTIC PT WARD VISIT

## 2017-08-18 PROCEDURE — 97530 THERAPEUTIC ACTIVITIES: CPT | Mod: GO | Performed by: OCCUPATIONAL THERAPIST

## 2017-08-18 PROCEDURE — 80048 BASIC METABOLIC PNL TOTAL CA: CPT | Performed by: PHYSICIAN ASSISTANT

## 2017-08-18 PROCEDURE — 40000193 ZZH STATISTIC PT WARD VISIT: Performed by: REHABILITATION PRACTITIONER

## 2017-08-18 PROCEDURE — 97112 NEUROMUSCULAR REEDUCATION: CPT | Mod: GP

## 2017-08-18 PROCEDURE — 97116 GAIT TRAINING THERAPY: CPT | Mod: GP | Performed by: REHABILITATION PRACTITIONER

## 2017-08-18 PROCEDURE — 99232 SBSQ HOSP IP/OBS MODERATE 35: CPT | Performed by: INTERNAL MEDICINE

## 2017-08-18 PROCEDURE — 25000125 ZZHC RX 250: Performed by: INTERNAL MEDICINE

## 2017-08-18 PROCEDURE — 97535 SELF CARE MNGMENT TRAINING: CPT | Mod: GO

## 2017-08-18 PROCEDURE — 80076 HEPATIC FUNCTION PANEL: CPT | Performed by: PHYSICIAN ASSISTANT

## 2017-08-18 PROCEDURE — 25000128 H RX IP 250 OP 636: Performed by: PHYSICAL MEDICINE & REHABILITATION

## 2017-08-18 PROCEDURE — 97116 GAIT TRAINING THERAPY: CPT | Mod: GP

## 2017-08-18 PROCEDURE — 97530 THERAPEUTIC ACTIVITIES: CPT | Mod: GP

## 2017-08-18 PROCEDURE — 40000133 ZZH STATISTIC OT WARD VISIT: Performed by: OCCUPATIONAL THERAPIST

## 2017-08-18 PROCEDURE — 97110 THERAPEUTIC EXERCISES: CPT | Mod: GP

## 2017-08-18 PROCEDURE — 85027 COMPLETE CBC AUTOMATED: CPT | Performed by: PHYSICIAN ASSISTANT

## 2017-08-18 PROCEDURE — 25000128 H RX IP 250 OP 636: Performed by: INTERNAL MEDICINE

## 2017-08-18 PROCEDURE — 12800006 ZZH R&B REHAB

## 2017-08-18 PROCEDURE — 36592 COLLECT BLOOD FROM PICC: CPT | Performed by: PHYSICIAN ASSISTANT

## 2017-08-18 PROCEDURE — 25000132 ZZH RX MED GY IP 250 OP 250 PS 637: Performed by: INTERNAL MEDICINE

## 2017-08-18 PROCEDURE — 99207 ZZC CDG-MDM COMPONENT: MEETS MODERATE - UP CODED: CPT | Performed by: INTERNAL MEDICINE

## 2017-08-18 PROCEDURE — 80162 ASSAY OF DIGOXIN TOTAL: CPT | Performed by: PHYSICIAN ASSISTANT

## 2017-08-18 PROCEDURE — 97530 THERAPEUTIC ACTIVITIES: CPT | Mod: GP | Performed by: REHABILITATION PRACTITIONER

## 2017-08-18 PROCEDURE — 40000133 ZZH STATISTIC OT WARD VISIT

## 2017-08-18 PROCEDURE — 25000132 ZZH RX MED GY IP 250 OP 250 PS 637: Performed by: PHYSICIAN ASSISTANT

## 2017-08-18 RX ORDER — ALLOPURINOL 300 MG/1
300 TABLET ORAL DAILY
Qty: 30 TABLET | Refills: 0 | Status: SHIPPED | OUTPATIENT
Start: 2017-08-18 | End: 2017-09-13

## 2017-08-18 RX ORDER — ACYCLOVIR 400 MG/1
400 TABLET ORAL 2 TIMES DAILY
Qty: 60 TABLET | Refills: 0 | Status: SHIPPED | OUTPATIENT
Start: 2017-08-18 | End: 2017-09-13

## 2017-08-18 RX ORDER — GABAPENTIN 100 MG/1
200 CAPSULE ORAL 3 TIMES DAILY
Qty: 120 CAPSULE | Refills: 0 | Status: SHIPPED | OUTPATIENT
Start: 2017-08-18 | End: 2017-11-07

## 2017-08-18 RX ORDER — FOLIC ACID 1 MG/1
1 TABLET ORAL DAILY
Qty: 30 TABLET | Refills: 0 | Status: SHIPPED | OUTPATIENT
Start: 2017-08-18 | End: 2017-09-06

## 2017-08-18 RX ORDER — PREDNISONE 5 MG/1
5 TABLET ORAL DAILY
Qty: 30 TABLET | Refills: 0 | Status: SHIPPED | OUTPATIENT
Start: 2017-08-18 | End: 2017-09-13

## 2017-08-18 RX ORDER — ATENOLOL 25 MG/1
25 TABLET ORAL 2 TIMES DAILY
Qty: 60 TABLET | Refills: 0 | Status: SHIPPED | OUTPATIENT
Start: 2017-08-18 | End: 2017-09-13

## 2017-08-18 RX ORDER — LEVOFLOXACIN 250 MG/1
250 TABLET, FILM COATED ORAL DAILY
Qty: 30 TABLET | Refills: 0 | Status: SHIPPED | OUTPATIENT
Start: 2017-08-23 | End: 2017-09-13

## 2017-08-18 RX ORDER — LACTOBACILLUS RHAMNOSUS GG 10B CELL
1 CAPSULE ORAL 2 TIMES DAILY
Qty: 60 CAPSULE | Refills: 0 | Status: SHIPPED | OUTPATIENT
Start: 2017-08-18 | End: 2017-09-15

## 2017-08-18 RX ORDER — DIGOXIN 125 MCG
125 TABLET ORAL DAILY
Qty: 30 TABLET | Refills: 0 | Status: SHIPPED | OUTPATIENT
Start: 2017-08-18 | End: 2017-09-13

## 2017-08-18 RX ORDER — FLUCONAZOLE 200 MG/1
200 TABLET ORAL DAILY
Qty: 30 TABLET | Refills: 0 | Status: SHIPPED | OUTPATIENT
Start: 2017-08-18 | End: 2017-09-13

## 2017-08-18 RX ORDER — DIPHENHYDRAMINE HYDROCHLORIDE AND LIDOCAINE HYDROCHLORIDE AND ALUMINUM HYDROXIDE AND MAGNESIUM HYDRO
10 KIT
Qty: 500 ML | Refills: 0 | Status: SHIPPED | OUTPATIENT
Start: 2017-08-18 | End: 2017-08-21

## 2017-08-18 RX ORDER — MULTIPLE VITAMINS W/ MINERALS TAB 9MG-400MCG
1 TAB ORAL DAILY
Qty: 30 EACH | Refills: 0 | Status: SHIPPED | OUTPATIENT
Start: 2017-08-18 | End: 2024-09-17

## 2017-08-18 RX ORDER — ASCORBIC ACID 500 MG
500 TABLET ORAL DAILY
Qty: 30 TABLET | Refills: 0 | Status: SHIPPED | OUTPATIENT
Start: 2017-08-18 | End: 2019-03-26

## 2017-08-18 RX ORDER — TRAMADOL HYDROCHLORIDE 50 MG/1
25 TABLET ORAL EVERY 6 HOURS PRN
Qty: 28 TABLET | Refills: 0 | Status: SHIPPED | OUTPATIENT
Start: 2017-08-18 | End: 2017-08-24

## 2017-08-18 RX ORDER — CEFTRIAXONE 1 G/1
1000 INJECTION, POWDER, FOR SOLUTION INTRAMUSCULAR; INTRAVENOUS DAILY
Qty: 30 ML | Refills: 0 | Status: SHIPPED | OUTPATIENT
Start: 2017-08-20 | End: 2017-08-23

## 2017-08-18 RX ORDER — CETIRIZINE HYDROCHLORIDE 10 MG/1
10 TABLET ORAL DAILY PRN
Qty: 30 TABLET | Refills: 0 | Status: SHIPPED | OUTPATIENT
Start: 2017-08-18 | End: 2017-08-21

## 2017-08-18 RX ORDER — POTASSIUM CHLORIDE 1500 MG/1
20 TABLET, EXTENDED RELEASE ORAL DAILY
Qty: 90 TABLET | Refills: 0 | Status: ON HOLD | OUTPATIENT
Start: 2017-08-18 | End: 2017-09-11

## 2017-08-18 RX ORDER — TRIAMCINOLONE ACETONIDE 5 MG/G
1 OINTMENT TOPICAL 3 TIMES DAILY
Qty: 30 G | Refills: 0 | Status: SHIPPED | OUTPATIENT
Start: 2017-08-18 | End: 2017-08-24

## 2017-08-18 RX ORDER — LEVOFLOXACIN 250 MG/1
250 TABLET, FILM COATED ORAL DAILY
Status: DISCONTINUED | OUTPATIENT
Start: 2017-08-24 | End: 2017-08-19 | Stop reason: HOSPADM

## 2017-08-18 RX ADMIN — ATENOLOL 25 MG: 25 TABLET ORAL at 08:57

## 2017-08-18 RX ADMIN — Medication 1 CAPSULE: at 21:55

## 2017-08-18 RX ADMIN — CEFTRIAXONE SODIUM 1 G: 1 INJECTION, POWDER, FOR SOLUTION INTRAMUSCULAR; INTRAVENOUS at 12:11

## 2017-08-18 RX ADMIN — GABAPENTIN 200 MG: 100 CAPSULE ORAL at 16:27

## 2017-08-18 RX ADMIN — ATENOLOL 25 MG: 25 TABLET ORAL at 21:55

## 2017-08-18 RX ADMIN — DIGOXIN 125 MCG: 125 TABLET ORAL at 08:57

## 2017-08-18 RX ADMIN — DIPHENHYDRAMINE HYDROCHLORIDE AND LIDOCAINE HYDROCHLORIDE AND ALUMINUM HYDROXIDE AND MAGNESIUM HYDRO 10 ML: KIT at 12:15

## 2017-08-18 RX ADMIN — SODIUM CHLORIDE, PRESERVATIVE FREE 5 ML: 5 INJECTION INTRAVENOUS at 06:42

## 2017-08-18 RX ADMIN — MULTIPLE VITAMINS W/ MINERALS TAB 1 TABLET: TAB at 08:56

## 2017-08-18 RX ADMIN — SODIUM CHLORIDE, PRESERVATIVE FREE 5 ML: 5 INJECTION INTRAVENOUS at 12:49

## 2017-08-18 RX ADMIN — ALLOPURINOL 300 MG: 300 TABLET ORAL at 08:57

## 2017-08-18 RX ADMIN — GABAPENTIN 200 MG: 100 CAPSULE ORAL at 21:55

## 2017-08-18 RX ADMIN — ACYCLOVIR 400 MG: 400 TABLET ORAL at 08:57

## 2017-08-18 RX ADMIN — TRIAMCINOLONE ACETONIDE: 5 OINTMENT TOPICAL at 21:55

## 2017-08-18 RX ADMIN — Medication 1 CAPSULE: at 08:57

## 2017-08-18 RX ADMIN — SODIUM CHLORIDE, PRESERVATIVE FREE 5 ML: 5 INJECTION INTRAVENOUS at 06:00

## 2017-08-18 RX ADMIN — FLUCONAZOLE 200 MG: 200 TABLET ORAL at 08:56

## 2017-08-18 RX ADMIN — OXYCODONE HYDROCHLORIDE AND ACETAMINOPHEN 500 MG: 500 TABLET ORAL at 08:56

## 2017-08-18 RX ADMIN — POTASSIUM CHLORIDE 20 MEQ: 750 TABLET, FILM COATED, EXTENDED RELEASE ORAL at 08:57

## 2017-08-18 RX ADMIN — CALCIUM CARBONATE 600 MG (1,500 MG)-VITAMIN D3 400 UNIT TABLET 2 TABLET: at 08:57

## 2017-08-18 RX ADMIN — PREDNISONE 5 MG: 5 TABLET ORAL at 08:57

## 2017-08-18 RX ADMIN — ACYCLOVIR 400 MG: 400 TABLET ORAL at 21:55

## 2017-08-18 RX ADMIN — CETIRIZINE HYDROCHLORIDE 10 MG: 10 TABLET, FILM COATED ORAL at 08:57

## 2017-08-18 RX ADMIN — TRIAMCINOLONE ACETONIDE: 5 OINTMENT TOPICAL at 08:58

## 2017-08-18 RX ADMIN — GABAPENTIN 200 MG: 100 CAPSULE ORAL at 08:56

## 2017-08-18 RX ADMIN — FOLIC ACID 1 MG: 1 TABLET ORAL at 08:56

## 2017-08-18 RX ADMIN — DIPHENHYDRAMINE HYDROCHLORIDE AND LIDOCAINE HYDROCHLORIDE AND ALUMINUM HYDROXIDE AND MAGNESIUM HYDRO 10 ML: KIT at 16:28

## 2017-08-18 RX ADMIN — THIAMINE HCL (VITAMIN B1) 50 MG TABLET 50 MG: at 08:56

## 2017-08-18 RX ADMIN — TRIAMCINOLONE ACETONIDE: 5 OINTMENT TOPICAL at 16:28

## 2017-08-18 NOTE — PROGRESS NOTES
08/16/17 1123   Signing Clinician's Name / Credentials   Signing clinician's name / credentials Luz Chambers, OTR/L    Quick Adds   Rehab Discipline OT   Therapeutic Exercise   Minutes of Treatment 25   Treatment Detail OT: Patient instructed in, performed and provided with HEP for increasing activity tolernace and AROM of B UEs; CVC exercises. Completed 5/5 exercises, 1 minute each.    ADL Training   Minutes of Treatment 35   Treatment Detail OT: In order to increase strength/ endurance to return to IADL tasks at home, pt completed the following: reaching to high shelf in pantry to obtain 10 items with use of step stool, SBA with step up and step down; completed scavenger hunt with 8 tasks located around the unit and onto additional floors on the building with emphaisis on EC/WS and efficency of task. Patient completed with 1 reast break for 25 min walking total. Patient demonstrates safety with managing elevator and use of 4WW throughout.    Additional Documentation   OT Plan OT: endurance training    OT - Acute Rehab Center Time   Individual Time (minutes) - enter zero if not applicable - OT 60  (self care- 35; ther ex- 25)   Group Time (minutes) - enter zero if not applicable  - OT 0   Concurrent Time (minutes) - enter zero if not applicable  - OT 0   Co-Treatment Time (minutes) - enter zero if not applicable  - OT 0   ARC Total Session Time (minutes) - OT 60

## 2017-08-18 NOTE — DISCHARGE INSTRUCTIONS
Follow up  As scheduled with Oncology 8/21, 8/24  You are scheduled to see Dr. Christiano PA-C on Monday, August 21 at 5:40 pm  You are scheduled with Oncology on Thursday, August 24th at 7:30 am    Phone: [phone]  Address: [address] [place] [address]      Cardiology 9/27  You are scheduled to see Dr. Eaton on September 27th at 1:30 pm    Phone: [phone]  Address: [address] [place] [address]    Rheumatology 9/15  You are scheduled to see Dr. Cunha on September 15th at 10 am    Address  [address] [place] [address]  Phone   [phone]    Urology- follow up urinary incontinence/frequency with recommended urodynamic studies.  (referral placed)    HOME CARE  [facility] [phone] will provide RN, PT, OT. They will call you after you discharge to schedule the first visit.

## 2017-08-18 NOTE — PLAN OF CARE
Problem: Goal/Outcome  Goal: Goal Outcome Summary  Pt is planning to d/c home on Saturday, 8/19 with continued support from family. Placed referral to [facility] for RN, PT and OT. Sw will continue to assist as needed.

## 2017-08-18 NOTE — PROGRESS NOTES
08/16/17 1400   Signing Clinician's Name / Credentials   Signing clinician's name / credentials Luz Chambers, OTR/L    Quick Adds   Rehab Discipline OT   Therapeutic Activity   Minutes of Treatment 30 minutes   Treatment Detail OT: Focus of session on dynamic balance and increasing activity tolerance in order to return home safely; with use of large therapy ball, forward stepping without support of B UEs and passing ball foward- 3 sets of 10 reps with each foot for stepping. Addressed pt and pt's family questions regaurding home set up and d/c plan.    Additional Documentation   OT Plan OT: shower in AM, outside, functional endurance    OT - Acute Rehab Center Time   Individual Time (minutes) - enter zero if not applicable - OT 30  (ther act )   Group Time (minutes) - enter zero if not applicable  - OT 0   Concurrent Time (minutes) - enter zero if not applicable  - OT 0   Co-Treatment Time (minutes) - enter zero if not applicable  - OT 0   ARC Total Session Time (minutes) - OT 30

## 2017-08-18 NOTE — PROGRESS NOTES
[facility] not able to supply the mepilex dressing per her insurance authorization.  [facility] will supply guaze, kerlix and tape, but not mepilex due to the drainage amount not qualifying for mepilex dressings.  [facility] will ship what insurance will authorize at discharge.  Continue discharge planning.

## 2017-08-18 NOTE — PLAN OF CARE
Problem: Goal/Outcome  Goal: Goal Outcome Summary  PT: pt has progressed well with PT. Pt has demo functional mobility. With 4WW up to 200; up and down 4 steps x 3 with Vinh rails, pt has new stair set up at home for ease of in and out of home. Pt demo gait and gait drills with and without AD needing close SBA for gait without.

## 2017-08-18 NOTE — PROGRESS NOTES
"  [facility]   Acute Rehabilitation Unit  Daily progress note    interval history  Amelia Michel \"Bree\" seen working with therapy ambulating down whitaker stopped for standing rest break, doing well rash improved, ongoing urinary frequency with incontinence at night which she feels is her baseline prior to admission, no new fevers, sob, or pain continues to feel well, looking forward to discharge home.  Has good handle on wound care and plans to attend class with  on infusion.  Has close follow up with oncology 8/21.  Plan for discharge tomorrow.    medications    triamcinolone   Topical TID     cetirizine  10 mg Oral Daily     lactobacillus rhamnosus (GG)  1 capsule Oral BID     heparin lock flush  5-10 mL Intracatheter Q24H     acyclovir  400 mg Oral BID     allopurinol  300 mg Oral Daily     ascorbic acid  500 mg Oral Daily     atenolol  25 mg Oral BID     calcium-vitamin D  2 tablet Oral QAM     cefTRIAXone  1 g Intravenous Daily     digoxin  125 mcg Oral Daily     fluconazole  200 mg Oral Daily     folic acid  1 mg Oral Daily     gabapentin  200 mg Oral TID     [START ON 8/23/2017] levofloxacin  250 mg Oral Daily     lidocaine visc 2% & diphenhydramine 12.5mg/5mL & maalox/mylanta w/simethicone (1:1:1 v/v/v)  10 mL Swish & Spit 4x Daily AC & HS     multivitamin, therapeutic with minerals  1 tablet Oral Daily     potassium chloride SA  20 mEq Oral Daily     predniSONE  5 mg Oral Daily     thiamine  50 mg Oral Daily        diphenhydrAMINE, sodium chloride (PF), heparin lock flush, acetaminophen, cetirizine, melatonin, traMADol, - MEDICATION INSTRUCTIONS -, naloxone     physical exam  /57 (BP Location: Left leg)  Pulse 66  Temp 96  F (35.6  C) (Axillary)  Resp 16  Ht 1.473 m (4' 10\")  Wt 54.5 kg (120 lb 3.2 oz)  SpO2 97%  BMI 25.12 kg/m2  Gen: alert feeling well NAD  Pulm: non labored on room air  Abd: soft non tender +BS  Ext: warm dry without " edema  Neuro/MSK: awake alert moves all extremities. Up ambulating with therapy down whitaker with FWW gait stable moving well    labs  reviewed      Rehabilitation - continue comprehensive acute inpatient rehabilitation program with multidisciplinary approach including therapies, rehab nursing, and physiatry following. See interval history for updates.      assessment and plan    Ms. Amelia Michel is a 56-year-old woman with a history of rheumatoid arthritis, previous cardiac arrest, atrial fibrillation/flutter (not on anticoagulation due to thrombocytopenia), recently diagnosed DLBCL, who was initially transferred from acute rehab to Hematology/Oncology Service on 08/08/2017.  On 08/12/2017  found to have worsening neutropenic fever and positive blood cultures.  Was admitted to [facility] 8/8-8/12, then readmitted to ARU 8/12 for ongoing rehabilitation, due to impaired activity tolerance, and strength.     Strep mitis bacteremia likely cause of neutropenic fever noted 8/7.  BCx + on 8/7/17 (positive in PICC & peripheral).  Remainder of neutropenic fever work up negative.  Blood cultures from 8/8 -8/11 negative.  Transitioned to Ceftriaxone to complete total of 14 day course antibiotics (through 8/23).  -continue ceftriaxone-  to go to [facility] for infusion today and plan for d/c home 8/19.   - Resume Levaquin 250mg following completion of ceftriaxone.(start 8/24)         A fib/flutter.  Rate controlled. Tachycardic overnight on 8/9. s/p 20 mg diltiazem.  Atenolol dose increased. Continue PTA dig/atenolol (at increased BID dose).  No anticoagulation 2/2 to thrombocytopenia.  -continue digoxin 125 mcg daily  -continue atenolol 25 mg bid      OSH cardiac arrest. 5/29/17.  Etiology unclear possibly r/t AV prieto blocking agents.  Meets critieria for secondary prevention ICD but placement is on hold r/t LUE extravasation wound & tx. Of lymphoma.  EP discussed risk vs. Benefit of Life Vest patient defer.    -Plan for f/u in  cardiology scheduled 9/27     Diffuse Large B Cell Lymphoma.  Stage IV with BM & liver involvement.  S/P cycle 1 DA-R-EPOCH D1 7/28/17 tolerated well however with Rituxan had SOB/hypotension.  Was started on daily neupogen  8/2.    -continue prophy acyclovir,  fluconazole (levofloxacin to resume after completion of ceftriaxone)   - Patient will switch to [facility] for next cycle as not a [employer] (per Jennifer/Susana)  -f/u oncology 8/21 with LP and follow up 8/24 for next chemo      Pancytopenia 2/2 to chemotherapy & underlying DLBCL with BM involvement. (improved) 8/18 WBC 3.7, Hgb 9.7, PLT 66   - Maintained on transfusion parameters to keep Hgb > 8 and PLT > 10.  -continue to trend CBC per oncology     Generalized Rash- first noted 8/15 on left arm, pink pin point now coalesced patches slightly raised  non pruritic, most consistent with allergic vs contact dermatitis, reportedly had similar rash in past for which she took zyrtec and steriod cream.  No sob, no itchy watery eyes, no nasal drainage.   -monitor  -continue zyrtec (as used in past and rash resolved)  -continue kenalog   -appreciate eval/recs from hospitalist team.     H/O saddle anethesia/urinary incontinence.   - Urodynamic study as outpatient.   -discontinued brand 8/17 with low PVRS with q 2-3 hour timed voiding while awake- at baseline with frequency and incontinence no dysuria, urine pale yellow, non odorous may wear depends at night.   - follow up with urology as outpatient.      Mucositis. (improved) L lateral tongue, lower gum line, R upper buccal mucosa 2/2 neutropenia from recent chemotherapy.   -Continue MMW.      Hyperuricemia-  Continue allopurinol.      LUE extravasation-  wound 2/2 OSH cardiac arrest.  Slowly improving    - continue WOCN   -continue wound care every other day per orders    Rheumatoid arthritis. History of seropositive rheumatoid arthritis (anti-CCP positive) since late 1980;s w/ multiple MTP osteotomies, partial right wrist fusion,  longstanding therapy consisting of MTX, prednisone and HCQ.   -Continue 5 mg PO prednisone.  - Follow-up with [facility] Rheumatology clinic Dr. Conor Cunha as scheduled 9/15       LFT derangement 2/2 to lymphoma involvement of liver. Expect LFTs to ultimately improve with treatment, but may fluctuate in the meantime. 8/18 bilirubin 0.3, AST 42, ALT 83, AlP 162 (continue to downtrend)  -continue to monitor      Anasarca/volume overload. resolved.       Parotitis (resolved)  identified on clinical exam & also on CT (no evidence of abscess).  Mumps PCR negative.     Aortic masses on BRE.  Not endocarditis.  Lymphoma (?).  Continue chemotherapy.   -Ongoing follow up per onc.       Patient seen and discussed with Dr. Josue  PM&R Staff Physician, Dr. Cordova (hospitalist)    Flory Agarwal PA-C  Rehab Service  Pager: [phone]

## 2017-08-18 NOTE — PLAN OF CARE
Problem: Goal/Outcome  Goal: Goal Outcome Summary  FOCUS/GOAL  Bowel management, Medication management and Medical management     ASSESSMENT, INTERVENTIONS AND CONTINUING PLAN FOR GOAL:  Patient is alert/oriented x4, has been safe with walker Mod-I in room.  Patient denies any pain on this shift and has been able to cooperate with therapies, denies dizziness or fatigue.  Patient is continent of bowel, still has some frequent/soft stool but per patient is baseline. Patient is voiding at baseline, no incontinence on this shift.  Rocephin given x1 on this shift and will be given in am tomorrow as patient is d/c'ing tomorrow.  Patient had PLC classess this afternoon and  attended as well.  No additional care concerns at this time, continue with POC.

## 2017-08-18 NOTE — PROGRESS NOTES
"Rash improved   Non itching   Developed similar rash when in hospital  No sob or chest pressure   On Exam ;   Alert and oriented . No acute distress  Vital signs:  Temp: 96  F (35.6  C) Temp src: Axillary BP: 153/57 Pulse: 66 Heart Rate: 74 Resp: 16 SpO2: 97 % O2 Device: None (Room air)   Height: 147.3 cm (4' 10\") Weight: 54.5 kg (120 lb 3.2 oz)  Estimated body mass index is 25.12 kg/(m^2) as calculated from the following:    Height as of this encounter: 1.473 m (4' 10\").    Weight as of this encounter: 54.5 kg (120 lb 3.2 oz).    Neck ; supple , no JVD  Chest ; equal BS bilaterally , no rales or rhonchi   CVS; RRR, no murmur /rubs or gallops  GI ; soft abdomen, non tender, BS positive  Ext ;  edema , no cynosis  Neuro ; CN 2 to 12 grossly intact , No Facial asymmetry noticed  .  Psych ; appropriate mood and effect  Labs; reviewed  A/P :   Strep Mitis bacteremia ; ct ceftriaxone thru 8/23 and then switch to po levaquin 250 mg daily   Parotitis ; improved . Conservative measures  Afib /flutter ; on atenolol and dig. No anticoagulation due to low plt  Hyperuricemia ; allopurinol  RA ; on 5 mg prednisone   Left upper ext wound ; wound cares   Urinary incontinence and saddle anesthesia ; patient plans to follow up with urology about studies as an out patient  Huynh removed  Abnormal LFTs ; thought to be due to lymphoma . Monitor   Spoke with PMR 8/18 and hematology team on 8/15  Diffuse large B cell lymphoma ; dc neupogen. Hb above 8 and plt above 10 ( goal )  Rash : on zyrtec and kenalog  creme . Likely contact ?  Improved per patient  Dispo ; per PMR , likely this Saturday with IV ceftriaxone        Medications reconciled 8/18                           "

## 2017-08-18 NOTE — PLAN OF CARE
Problem: Goal/Outcome  Goal: Goal Outcome Summary  Outcome: Improving  FOCUS/GOAL  Bladder management and Mobility     ASSESSMENT, INTERVENTIONS AND CONTINUING PLAN FOR GOAL:  Pt has appeared to be sleeping on rounds, and repositions herself in bed. Pt also up independently with her walker, to use the BR, and is voiding without difficulty. Pt will be participating in [place] day today, with plans to discharge to home tomorrow.

## 2017-08-18 NOTE — PROGRESS NOTES
08/16/17 0930   Signing Clinician's Name / Credentials   Signing clinician's name / credentials Christopher Parmar, HARSHA   Quick Adds   Rehab Discipline PT   Therapeutic Exercise   Treatment Detail PT: Passive stretching performed on mat.  Supine HS stretching performed B: ~30 sec x 3 with PNF contract-relax used.  Sidelying hip flexors stretched B: 30 sec x 3 with PNF contract-relax used.   Supervision   Supervision Direct Patient Contact Provided   Additional Documentation   PT Plan PT: stretch hip IR/ER, neuro-darrius to hip ER   Total Session Time   Total Session Time (minutes) 60 minutes  (= 10 gait, 25 ther act, 25 ther ex)   PT - Acute Rehab Center Time   Individual Time (minutes) - enter zero if not applicable - PT 60   Group Time (minutes) - enter zero if not applicable  - PT 0   Concurrent Time (minutes) - enter zero if not applicable  - PT 0   Co-Treatment Time (minutes) - enter zero if not applicable  - PT 0   ARC Total Session Time (minutes) - PT 60   Lying to Sitting on Side of Bed   Assistance Needed Independent   Sit to Stand   Assistance Needed Independent;Adaptive equipment   Comment PT: Mod I for sit<>stand with 4WW   Locomotion (FIM)   Assistive Devices Rolling walker   Functional Performance Safety concern (see comments)   Locomotion Comment PT: Amb 200 ft, 140 ft, 140 ft, 140 ft, 60 ft with 4WW and SBA.  Focus on energy conservation with 2-5 min rest breaks in between.  Pt conversational throughout.   Walk 10 Feet   Assistance Needed Independent   Physical Assistance Level No physical assistance   Comment PT: [place]alka in room with 4WW   Walk 50 Feet with Two Turns   Assistance Needed Supervision   Physical Assistance Level No physical assistance   Comment PT: see FIM   Walk 150 Feet   Assistance Needed Supervision   Physical Assistance Level No physical assistance   Comment PT: see FIM   Walking 10 Feet on Uneven Surfaces   Assistance Needed Supervision   Physical Assistance Level No physical assistance  "  Comment PT: see FIM   Stairs (FIM)   Assistive Devices 2 rails   Functional Performance Safety concern (see comments)   Stairs Comment PT: 3 6\" stairs up/down x 2 with CGA/SBA.  First time, step-to with B rails.  Second time, step-to with both hands on L rail.  Steps up with R, down with L.  Slight weakness noted when raising L leg.   1 Step (Curb)   Physical Assistance Level Contact guard assist   Comment PT: see FIM     "

## 2017-08-18 NOTE — PROGRESS NOTES
08/16/17 1515   Signing Clinician's Name / Credentials   Signing clinician's name / credentials Christopher Parmar, HARSHA   Quick Adds   Rehab Discipline PT   Therapeutic Exercise   Symptoms Noted During/After Treatment increased pain   Treatment Detail PT: Supine glutes and piriformis strech performed 2/2 pt reporting difficulty donning shoes and socking.  Therapist assisted B passive stretching, instructed pt in daily morning stretching to complete in bed to make socks and shoes easier.  Pt and  in agreeance, printed handout given.    Supervision   Supervision Direct Patient Contact Provided   Additional Documentation   PT Plan PT: Attempt 6MWT, progress stairs, NuStep for endurance   Total Session Time   Total Session Time (minutes) 40 minutes  (25 ther ex, 15 ther act)   PT - Acute Rehab Center Time   Individual Time (minutes) - enter zero if not applicable - PT 40   Group Time (minutes) - enter zero if not applicable  - PT 0   Concurrent Time (minutes) - enter zero if not applicable  - PT 0   Co-Treatment Time (minutes) - enter zero if not applicable  - PT 0   ARC Total Session Time (minutes) - PT 40   Sit to Stand   Assistance Needed Supervision   Physical Assistance Level No physical assistance   Comment PT: SBA for sit<> stands from EOM and 4WW seat   Locomotion (FIM)   Assistive Devices Rolling walker   Functional Performance Safety concern (see comments)   Locomotion Comment PT: SBA for amb 190 ft with 4WW.  Conversational throughout.  Able to go without mask d/t increased white count numbers, pt reports that it feels much easier to breath when walking.   Walk 10 Feet   Assistance Needed Supervision   Physical Assistance Level No physical assistance   Comment PT: see FIM   Walk 50 Feet with Two Turns   Assistance Needed Supervision   Physical Assistance Level No physical assistance   Comment PT: see FIm   Walk 150 Feet   Assistance Needed Supervision   Physical Assistance Level No physical assistance  "  Comment PT: see FIM   Walking 10 Feet on Uneven Surfaces   Assistance Needed Supervision   Physical Assistance Level No physical assistance   Comment PT: See FIM   Stairs (FIM)   Assistive Devices 2 rails   Functional Performance Safety concern (see comments)   Stairs Comment PT: SBA for up/down 3 6\" steps with B rails.  Alternating pattern up, step-to pattern going down.   1 Step (Curb)   Assistance Needed Supervision   Physical Assistance Level No physical assistance   Comment PT: see FIM   4 Steps   Assistance Needed Supervision   Physical Assistance Level No physical assistance   Comment PT: see FIM     "

## 2017-08-18 NOTE — PLAN OF CARE
Problem: Goal/Outcome  Goal: Goal Outcome Summary  Outcome: No Change  FOCUS/GOAL  Bowel management, Bladder management and Medical management     ASSESSMENT, INTERVENTIONS AND CONTINUING PLAN FOR GOAL:  Pt continent of bladder. LBM 8/17 with AM shift. Reviewed medication list with pt who verbalized understanding. Nursing working with pt to transition rocephin infusion to earlier time of day per pt preference for when she is home and working with home infusion. Denies pain at this time. Continue with POC.

## 2017-08-18 NOTE — PLAN OF CARE
Problem: Goal/Outcome  Goal: Goal Outcome Summary  Occupational Therapy Discharge Summary     Reason for therapy discharge:    Discharged to home with home therapy.     Progress towards therapy goal(s). See goals on Care Plan in [facility] electronic health record for goal details.  Goals met     Therapy recommendation(s):    No further therapy is recommended. No ongoing OT needs. Pt to continue with HH PT for home safety assessment, balance, endurance, and strengthening. Pt will continue HH PT through chemo treatment.     Summary: Pt is a 56 yr old female admitted to IP ARU following hospital stay for cardiac arrest May 29, 2017 requiring ECMO with post-arrest multiorgan failure including pancytopenia, shock liver, hypoxic respiratory failure, acute kidney injury, anoxic brain injury and critical illness myopathy in addition to ESBL UTI and aspiration pneumonia. Pt re-admitted to hospital several times for ongoing medical complications but has remained medically stable since her last ARU admission.  ADLs/IADLs: Pt demo Mod IND ADLs including dressing, bathing, g/h, and toileting. Pt demo Mod IND simple meal prep/household tasks using fww. Pt has been educated on EC/WS strategies and able to initiate strategies IND. Spouse to assist with community transportation and more complex household management tasks as needed.  HEP: Pt issued BUE strengthening and endurance HEP. Pt will benefit from HEP to maintain progress gained during IP ARU.  DME/AE: fww and shower chair  Caregiver support: Spouse will be home for first week to provide support and establish routine. Spouse to assist with IADLs as needed.

## 2017-08-18 NOTE — DISCHARGE SUMMARY
"    [facility]   Acute Rehabilitation Unit  Discharge summary     Date of Admission: 8/12/2017  Date of Discharge: 8/19/2017  Disposition: home  Primary Care Physician: Comfort Sabillon  Attending physician: Mary Babcock MD      discharge diagnosis  Diffuse large b cell lymphoma  Strep mitis bacteremia  A-fib/flutter  Pancytopenia  ra  Elevated lfts      brief summary  Ms. Amelia Michel is a 56-year-old woman with a history of rheumatoid arthritis, previous cardiac arrest, atrial fibrillation/flutter (not on anticoagulation due to thrombocytopenia), recently diagnosed DLBCL, who was initially transferred from acute rehab to Hematology/Oncology Service on 08/08/2017.  On 08/12/2017  found to have worsening neutropenic fever and positive blood cultures.  Was admitted to [facility] 8/8-8/12, then readmitted to ARU 8/12 for ongoing rehabilitation, due to impaired activity tolerance, and strength.     rehabilitaiton course  ADLs/IADLs: Patient progressing well with OT goals, mod I for dressing, bathing, grooming. Patient upgraded to mod I in the room with use of 4WW- mod I with bed mobility, chair transfer and toilet transfers. Patient continues with decreased activity tolerance with activity. Patient will have no further OT goals at time of d/c, recommend continuing with HEP.      Mobility: Pt is modified independent in room.  Mod I with bed mobility, SBA for transfers and ambulation. Pt has demonstrated 4 6\" steps x 2 with B rails and CGA.  Pt has ambulated 240 ft without rest, today ambulated ~600 ft with intermittent rest for energy conservation.  Will continue to progress independence with stairs, increase endurance with ambulation, and educate on falls prevention.  Recommend pt received home health care PT and uses a 4WW for ambulation.    mEDICAL COURSE  Strep mitis bacteremia likely cause of neutropenic fever noted 8/7.  BCx + on 8/7/17 (positive in PICC & peripheral).  Remainder " of neutropenic fever work up negative.  Blood cultures from 8/8 -8/11 negative.    -continue ceftriaxone through 8/23   - Resume Levaquin 250mg following completion of ceftriaxone.(start 8/24)         A fib/flutter.  Rate controlled. Tachycardic overnight on 8/9. s/p 20 mg diltiazem.  Atenolol dose increased. Continue PTA dig/atenolol (at increased BID dose).  No anticoagulation initiated 2/2 to thrombocytopenia, monitor as outpatient as PLT up trending.   -continue digoxin 125 mcg daily  -continue atenolol 25 mg bid      OSH cardiac arrest. 5/29/17.  Etiology unclear possibly r/t AV prieto blocking agents.  Meets critieria for secondary prevention ICD but placement is on hold r/t LUE extravasation wound & tx. Of lymphoma.  EP discussed risk vs. Benefit of Life Vest patient defer.    -Plan for f/u in cardiology scheduled 9/27      Diffuse Large B Cell Lymphoma.  Stage IV with BM & liver involvement.  S/P cycle 1 DA-R-EPOCH D1 7/28/17 tolerated well however with Rituxan had SOB/hypotension.  Was started on daily neupogen  8/2.    -continue prophy acyclovir,  fluconazole (levofloxacin to resume after completion of ceftriaxone)   - Patient will switch to [facility] for next cycle as not a [employer] (per Jennifer/Susana)  -f/u oncology 8/21 with LP and follow up 8/24 for next chemo      Pancytopenia 2/2 to chemotherapy & underlying DLBCL with BM involvement. (improved) 8/18 WBC 3.7, Hgb 9.7, PLT 66   - Maintained on transfusion parameters to keep Hgb > 8 and PLT > 10.  -continue to trend CBC per oncology      Generalized Rash- first noted 8/15 on left arm, pink pin point now coalesced patches slightly raised  non pruritic, most consistent with allergic vs contact dermatitis, reportedly had similar rash in past for which she took zyrtec and steriod cream.  No sob, no itchy watery eyes, no nasal drainage.   -continue zyrtec (as used in past and rash resolved)  -continue kenalog     H/O saddle anethesia/urinary incontinence. -discontinued  brand 8/17 with low PVRS with q 2-3 hour timed voiding while awake- at baseline with frequency and incontinence no dysuria, urine pale yellow, non odorous may wear depends at night.   - Urodynamic study as outpatient.   - follow up with urology as outpatient.       Mucositis. (improved) L lateral tongue, lower gum line, R upper buccal mucosa 2/2 neutropenia from recent chemotherapy.   -Continue MMW.       Hyperuricemia-  Continue allopurinol.      LUE extravasation-  wound 2/2 OSH cardiac arrest.  Slowly improving.  Seen and evaluated by WOCN during ARU stay.   -plan for outpatient WOCN referral   -  to assist with dressing change as outpatient.   -continue wound care every other day per orders     Rheumatoid arthritis. History of seropositive rheumatoid arthritis (anti-CCP positive) since late 1980;s w/ multiple MTP osteotomies, partial right wrist fusion, longstanding therapy consisting of MTX, prednisone and HCQ.   -Continue 5 mg PO prednisone.  - Follow-up with [facility]ology clinic Dr. Conor Cunha as scheduled 9/15       LFT derangement 2/2 to lymphoma involvement of liver. Expect LFTs to ultimately improve with treatment, but may fluctuate in the meantime. 8/18 bilirubin 0.3, AST 42, ALT 83, AlP 162 (continue to downtrend)       Aortic masses on BRE.  Not endocarditis.  Lymphoma (?).  Continue chemotherapy.   -Ongoing follow up per onc.   dISCHARGE MEDICATIONS  Current Discharge Medication List      START taking these medications    Details   lactobacillus rhamnosus, GG, (CULTURELL) capsule Take 1 capsule by mouth 2 times daily  Qty: 60 capsule, Refills: 0    Associated Diagnoses: Physical deconditioning      triamcinolone (KENALOG) 0.5 % OINT ointment Apply 1 g topically 3 times daily  Qty: 30 g, Refills: 0    Associated Diagnoses: Allergic reaction, subsequent encounter      calcium-vitamin D (CALTRATE) 600-400 MG-UNIT per tablet Take 2 tablets by mouth every morning  Qty: 60 tablet, Refills: 0     Associated Diagnoses: Diffuse large B-cell lymphoma, unspecified body region (H)         CONTINUE these medications which have CHANGED    Details   cefTRIAXone (ROCEPHIN) 1 GM vial Inject 1 g (1,000 mg) into the muscle daily for 3 days  Qty: 30 mL, Refills: 0    Comments: IV rocephin 8/20-8/23.  Associated Diagnoses: Gram-positive bacteremia      traMADol (ULTRAM) 50 MG tablet Take 0.5 tablets (25 mg) by mouth every 6 hours as needed for moderate pain  Qty: 28 tablet, Refills: 0    Associated Diagnoses: Diffuse large B-cell lymphoma, unspecified body region (H)      gabapentin (NEURONTIN) 100 MG capsule Take 2 capsules (200 mg) by mouth 3 times daily  Qty: 120 capsule, Refills: 0    Associated Diagnoses: Critical illness myopathy      fluconazole (DIFLUCAN) 200 MG tablet Take 1 tablet (200 mg) by mouth daily  Qty: 30 tablet, Refills: 0    Associated Diagnoses: Diffuse large B-cell lymphoma, unspecified body region (H)      cetirizine (ZYRTEC) 10 MG tablet Take 1 tablet (10 mg) by mouth daily as needed for allergies  Qty: 30 tablet, Refills: 0    Associated Diagnoses: Allergic reaction, subsequent encounter      acyclovir (ZOVIRAX) 400 MG tablet Take 1 tablet (400 mg) by mouth 2 times daily  Qty: 60 tablet, Refills: 0    Associated Diagnoses: Diffuse large B-cell lymphoma, unspecified body region (H)      atenolol (TENORMIN) 25 MG tablet Take 1 tablet (25 mg) by mouth 2 times daily  Qty: 60 tablet, Refills: 0    Associated Diagnoses: Atrial tachycardia (H)      digoxin (LANOXIN) 125 MCG tablet Take 1 tablet (125 mcg) by mouth daily  Qty: 30 tablet, Refills: 0    Associated Diagnoses: Atrial tachycardia (H)      predniSONE (DELTASONE) 5 MG tablet Take 1 tablet (5 mg) by mouth daily  Qty: 30 tablet, Refills: 0    Associated Diagnoses: Diffuse large B-cell lymphoma, unspecified body region (H)      levofloxacin (LEVAQUIN) 250 MG tablet Take 1 tablet (250 mg) by mouth daily  Qty: 30 tablet, Refills: 0    Associated  Diagnoses: Diffuse large B-cell lymphoma, unspecified body region (H)      allopurinol (ZYLOPRIM) 300 MG tablet Take 1 tablet (300 mg) by mouth daily  Qty: 30 tablet, Refills: 0    Associated Diagnoses: Diffuse large B-cell lymphoma, unspecified body region (H)      folic acid (FOLVITE) 1 MG tablet Take 1 tablet (1 mg) by mouth daily  Qty: 30 tablet, Refills: 0    Associated Diagnoses: Diffuse large B-cell lymphoma, unspecified body region (H)      potassium chloride SA (K-DUR/KLOR-CON M) 20 MEQ CR tablet Take 1 tablet (20 mEq) by mouth daily  Qty: 90 tablet, Refills: 0    Associated Diagnoses: Diffuse large B-cell lymphoma, unspecified body region (H)      magic mouthwash suspension (diphenhydramine, lidocaine, aluminum-magnesium & simethicone) Swish and spit 10 mLs in mouth 4 times daily (before meals and nightly)  Qty: 500 mL, Refills: 0    Associated Diagnoses: Diffuse large B-cell lymphoma, unspecified body region (H)      multivitamin, therapeutic with minerals (THERA-VIT-M) TABS tablet Take 1 tablet by mouth daily  Qty: 30 each, Refills: 0    Associated Diagnoses: Diffuse large B-cell lymphoma, unspecified body region (H)      ascorbic acid 500 MG TABS Take 1 tablet (500 mg) by mouth daily  Qty: 30 tablet, Refills: 0    Associated Diagnoses: Diffuse large B-cell lymphoma, unspecified body region (H)      thiamine 50 MG TABS Take 1 tablet (50 mg) by mouth daily  Qty: 30 tablet, Refills: 0    Associated Diagnoses: Diffuse large B-cell lymphoma, unspecified body region (H)         CONTINUE these medications which have NOT CHANGED    Details   acetaminophen (TYLENOL) 325 MG tablet Take 2 tablets (650 mg) by mouth every 4 hours as needed for mild pain  Qty: 100 tablet    Associated Diagnoses: Rheumatoid arthritis involving multiple sites with positive rheumatoid factor (H)         STOP taking these medications       Calcium Carb-Cholecalciferol 600-800 MG-UNIT TABS Comments:   Reason for Stopping:          filgrastim 15 mcg/mL, in Dextrose, (NEUPOGEN) 15 mcg/ml Comments:   Reason for Stopping:         melatonin 1 MG TABS tablet Comments:   Reason for Stopping:         senna-docusate (SENOKOT-S;PERICOLACE) 8.6-50 MG per tablet Comments:   Reason for Stopping:                 DISCHARGE INSTRUCTIONS AND FOLLOW UP    Discharge Procedure Orders  Home care nursing referral   Referral Type: Home Health Therapies & Aides     Home Care PT Referral for Hospital Discharge   Referral Type: Home Health Therapies & Aides     Home infusion referral     Reason for your hospital stay   Order Comments: Rehab after acute hospital stay to regain function so as to go home safely.     MD face to face encounter   Order Comments: Documentation of Face to Face and Certification for Home Health Services    I certify that patient: Amelia Michel is under my care and that I, or a nurse practitioner or physician's assistant working with me, had a face-to-face encounter that meets the physician face-to-face encounter requirements with this patient on: 8/16/2017.    This encounter with the patient was in whole, or in part, for the following medical condition, which is the primary reason for home health care: severely limited in mobility. Uses wheeled walker. Has lymphoma and is very disabled from this.    I certify that, based on my findings, the following services are medically necessary home health services: Nursing, Physical Therapy and home infusion.    My clinical findings support the need for the above services because: Nurse is needed: To provide assessment and oversight required in the home to assure adherence to the medical plan due to: her disability and current requirement for IV abx and very limited mobility.    Further, I certify that my clinical findings support that this patient is homebound (i.e. absences from home require considerable and taxing effort and are for medical reasons or [facility] services or infrequently or of short  duration when for other reasons) because: Leaving home is medically contraindicated for the following reason(s): Infection risk / immunocompromised state where it is safer for them to receive services in the home.    Based on the above findings. I certify that this patient is confined to the home and needs intermittent skilled nursing care, physical therapy and/or speech therapy.  The patient is under my care, and I have initiated the establishment of the plan of care.  This patient will be followed by a physician who will periodically review the plan of care.  Physician/Provider to provide follow up care: Comfort Sabillon    Attending hospital physician (the Medicare certified [place] provider): Regino Josue  Physician Signature: See electronic signature associated with these discharge orders.  Date: 8/16/2017     Wound care and dressings   Order Comments: Left upper arm wounds every other day: Clean wound and skin around wound with microklenz.  Paint Cavilon no sting barrier to skin around wound.  Apply Iodosorb gel nickel thick to areas with slough.  Tuck minimal amount of isodosrob gel to undermined area on middle wound with Q-tip. Cover with two mepilex border dressings.     Adult [facility] Follow-up and recommended labs and tests   Order Comments: Follow up with oncology as scheduled  8/21 and 8/24 (to start chemo).   Follow up with cardiology as scheduled 9/27  Follow up with rheumatology as scheduled 9/15.  Recommend Urology follow up ongoing frequency and incontinence for urodynamic studies.  Appointments on [facility] and/or [facility] (with [facility] or [facility] provider or service). Call [phone] if you haven't heard regarding these appointments within 7 days of discharge.     Activity   Order Comments: Up with walker.   Order Specific Question Answer Comments   Is discharge order? Yes      Full Code     Diet   Order Comments: Follow this diet upon discharge: Orders Placed This Encounter     Room Service      Regular Diet Adult   Order Specific Question Answer Comments   Is discharge order? Yes           physical examination    Most recent Vital Signs:   Vitals:    08/17/17 2219 08/18/17 0628 08/18/17 0858 08/18/17 1609   BP: 139/63  153/57 144/60   BP Location: Left leg  Left leg Left leg   Pulse:   66 75   Resp:   16 16   Temp:   96  F (35.6  C) 96.4  F (35.8  C)   TempSrc:   Axillary Oral   SpO2:   97% 99%   Weight:  54.5 kg (120 lb 3.2 oz)     Height:       Was seen and evaluated by Dr. Ray & Dr. Cordova 8/19 prior to discharge. see there notes for exam.     Discharge summary was forwarded to Comfort Sabillon (PCP) at the time of discharge, so as to bridge from hospital to outpatient care.     It was our pleasure to care for Amelia Michel during this hospitalization. Please do not hesitate to contact me should there be questions regarding the hospital course or discharge plan.          Flory Agarwal PA-C   Rehab Medicine Service

## 2017-08-18 NOTE — CONSULTS
Bree and Wolf were seen for instructions on her PICC line and administration of her IV antibiotics. They were both very attentive and asked questions throughout the class about the care and process for the PICC and IV meds. They both demonstrated how to change the end caps and flush and do the IV medication as an IV push. We also talked about situations that would require more medical attention and who to contact should a situation occur. They were both given the written material for the class.

## 2017-08-19 VITALS
DIASTOLIC BLOOD PRESSURE: 65 MMHG | OXYGEN SATURATION: 98 % | BODY MASS INDEX: 25.23 KG/M2 | HEART RATE: 65 BPM | TEMPERATURE: 96.4 F | SYSTOLIC BLOOD PRESSURE: 156 MMHG | RESPIRATION RATE: 16 BRPM | HEIGHT: 58 IN | WEIGHT: 120.2 LBS

## 2017-08-19 PROCEDURE — 25000125 ZZHC RX 250: Performed by: INTERNAL MEDICINE

## 2017-08-19 PROCEDURE — 25000132 ZZH RX MED GY IP 250 OP 250 PS 637: Performed by: PHYSICIAN ASSISTANT

## 2017-08-19 PROCEDURE — 99232 SBSQ HOSP IP/OBS MODERATE 35: CPT | Performed by: INTERNAL MEDICINE

## 2017-08-19 PROCEDURE — 40000193 ZZH STATISTIC PT WARD VISIT: Performed by: PHYSICAL THERAPIST

## 2017-08-19 PROCEDURE — 97530 THERAPEUTIC ACTIVITIES: CPT | Mod: GP | Performed by: PHYSICAL THERAPIST

## 2017-08-19 PROCEDURE — 25000128 H RX IP 250 OP 636: Performed by: INTERNAL MEDICINE

## 2017-08-19 PROCEDURE — 25000128 H RX IP 250 OP 636: Performed by: PHYSICAL MEDICINE & REHABILITATION

## 2017-08-19 PROCEDURE — 25000132 ZZH RX MED GY IP 250 OP 250 PS 637: Performed by: INTERNAL MEDICINE

## 2017-08-19 PROCEDURE — 99207 ZZC CDG-MDM COMPONENT: MEETS MODERATE - UP CODED: CPT | Performed by: INTERNAL MEDICINE

## 2017-08-19 PROCEDURE — 97116 GAIT TRAINING THERAPY: CPT | Mod: GP | Performed by: PHYSICAL THERAPIST

## 2017-08-19 RX ADMIN — MULTIPLE VITAMINS W/ MINERALS TAB 1 TABLET: TAB at 08:18

## 2017-08-19 RX ADMIN — PREDNISONE 5 MG: 5 TABLET ORAL at 08:18

## 2017-08-19 RX ADMIN — ALLOPURINOL 300 MG: 300 TABLET ORAL at 08:18

## 2017-08-19 RX ADMIN — ACYCLOVIR 400 MG: 400 TABLET ORAL at 08:17

## 2017-08-19 RX ADMIN — FLUCONAZOLE 200 MG: 200 TABLET ORAL at 08:18

## 2017-08-19 RX ADMIN — FOLIC ACID 1 MG: 1 TABLET ORAL at 08:18

## 2017-08-19 RX ADMIN — OXYCODONE HYDROCHLORIDE AND ACETAMINOPHEN 500 MG: 500 TABLET ORAL at 08:17

## 2017-08-19 RX ADMIN — CALCIUM CARBONATE 600 MG (1,500 MG)-VITAMIN D3 400 UNIT TABLET 2 TABLET: at 08:17

## 2017-08-19 RX ADMIN — POTASSIUM CHLORIDE 20 MEQ: 750 TABLET, FILM COATED, EXTENDED RELEASE ORAL at 08:18

## 2017-08-19 RX ADMIN — SODIUM CHLORIDE, PRESERVATIVE FREE 5 ML: 5 INJECTION INTRAVENOUS at 06:51

## 2017-08-19 RX ADMIN — CETIRIZINE HYDROCHLORIDE 10 MG: 10 TABLET, FILM COATED ORAL at 08:18

## 2017-08-19 RX ADMIN — Medication 1 CAPSULE: at 08:17

## 2017-08-19 RX ADMIN — TRIAMCINOLONE ACETONIDE: 5 OINTMENT TOPICAL at 08:19

## 2017-08-19 RX ADMIN — CEFTRIAXONE SODIUM 1 G: 1 INJECTION, POWDER, FOR SOLUTION INTRAMUSCULAR; INTRAVENOUS at 08:19

## 2017-08-19 RX ADMIN — ATENOLOL 25 MG: 25 TABLET ORAL at 08:19

## 2017-08-19 RX ADMIN — GABAPENTIN 200 MG: 100 CAPSULE ORAL at 08:17

## 2017-08-19 RX ADMIN — SODIUM CHLORIDE, PRESERVATIVE FREE 5 ML: 5 INJECTION INTRAVENOUS at 11:28

## 2017-08-19 RX ADMIN — DIGOXIN 125 MCG: 125 TABLET ORAL at 08:18

## 2017-08-19 RX ADMIN — THIAMINE HCL (VITAMIN B1) 50 MG TABLET 50 MG: at 08:18

## 2017-08-19 NOTE — PLAN OF CARE
Problem: Goal/Outcome  Goal: Goal Outcome Summary  Outcome: No Change  FOCUS/GOAL  Bowel management, Bladder management, Wound care management and Medical management     ASSESSMENT, INTERVENTIONS AND CONTINUING PLAN FOR GOAL:  Pt has been continent of bladder this shift. LBM 8/17. Pt refused dressing change to L arm wound stating that she wants her  to do it with nursing tomorrow prior to discharge. Pt will need supplies for dressing change. Pt is set to discharge tomorrow 8/19. Denies pain at this time. Continue with POC.

## 2017-08-19 NOTE — PLAN OF CARE
Problem: Goal/Outcome  Goal: Goal Outcome Summary  Outcome: Improving  FOCUS/GOAL  Bladder management, Discharge planning and Mobility     ASSESSMENT, INTERVENTIONS AND CONTINUING PLAN FOR GOAL:  Pt appears to sleep well at night. She is up independently to use the BR, and has been continent of bladder, although she does have problem with dribbling. Pt manages her own hygiene cares and changing brief. Pt will be discharging to home today. She would like her  to change her L arm dressing before her discharge, for practice. Also dressing supplies need to be sent home with her until Monday. Both pt and  attended [facility] for PICC cares and IV antibiotic administration.

## 2017-08-19 NOTE — PLAN OF CARE
Problem: Goal/Outcome  Goal: Goal Outcome Summary  Outcome: Completed Date Met:  08/19/17  Physical Therapy Discharge Summary     Reason for therapy discharge:    Discharged to home with home therapy.     Progress towards therapy goal(s). See goals on Care Plan in [facility] electronic health record for goal details.  Goals partially met.  Barriers to achieving goals:   gait speed.     Therapy recommendation(s):    Continued therapy is recommended.  Rationale/Recommendations:  excellet progress allowing pt to return home to continue her recovery and progress through home services to outpatient and toward PLF..

## 2017-08-19 NOTE — PROGRESS NOTES
[facility], [place]    Physical Medicine and Rehabilitation Daily Note     Assessment and Plan:    Ms. Amelia Michel is a 56-year-old woman with a history of rheumatoid arthritis, previous cardiac arrest, atrial fibrillation/flutter (not on anticoagulation due to thrombocytopenia), recently diagnosed DLBCL, who was initially transferred from acute rehab to Hematology/Oncology Service on 08/08/2017.  On 08/12/2017  found to have worsening neutropenic fever and positive blood cultures.  Was admitted to [facility] 8/8-8/12, then readmitted to ARU 8/12 for ongoing rehabilitation, due to impaired activity tolerance, and strength.      Interval History:    Patient seen on rounds, making gains with therapies. Hospitalist team following for medical issues. Independent with comprehension, expression, memory and problem solving. Mod independent with dressing. Mod independent with transfers with FWW. Ambulates 250 ft x 2 with FWW and asst. Continue therapies and discharge planning for today.          Review of Systems:    8 systems reviewed, see interval history for details.     Medications:    See chart     Physical Exam:      All vitals stable  Constitutional: Alert, NAD    Lungs:  Clear    Cardiovascular:  RRR   Abdomen: Soft    Genitounirinary:  Cont urine    Musculoskeletal: No joint swelling or redness    Neurologic:  No new focal changes      Total time spent >30 min with this discharge     SCARLETT PRINCE

## 2017-08-19 NOTE — PLAN OF CARE
Problem: Activity Intolerance (Adult)  Goal: Effective Energy Conservation Techniques  Patient will demonstrate the desired outcomes by discharge/transition of care.   Outcome: Adequate for Discharge Date Met:  08/19/17  Pt  changed left arm dressing, did a great job.   Reviewed discharge medications, and directions with patient who verbalized good understanding.  All medications, belongings, and wheeled walker discharged with patient.  PICC patent, remained in for use with home care.  Dressing change on PICC and cap change done.

## 2017-08-19 NOTE — PROGRESS NOTES
"Rash improved   No new issues reported per patient or nursing staff     On Exam ;   Alert and oriented . No acute distress  Vital signs:  Temp: 96.4  F (35.8  C) Temp src: Oral BP: 156/65 Pulse: 65 Heart Rate: 65 Resp: 16 SpO2: 98 % O2 Device: None (Room air)   Height: 147.3 cm (4' 10\") Weight: 54.5 kg (120 lb 3.2 oz)  Estimated body mass index is 25.12 kg/(m^2) as calculated from the following:    Height as of this encounter: 1.473 m (4' 10\").    Weight as of this encounter: 54.5 kg (120 lb 3.2 oz).    Neck ; supple , no JVD  Chest ; equal BS bilaterally , no rales or rhonchi   CVS; RRR, no murmur /rubs or gallops  GI ; soft abdomen, non tender, BS positive  Ext ;  edema , no cynosis  Neuro ; CN 2 to 12 grossly intact , No Facial asymmetry noticed  .  Psych ; appropriate mood and effect  Labs; none today 8/19  A/P :   Strep Mitis bacteremia ; ct ceftriaxone thru 8/23 and then switch to po levaquin 250 mg daily   Parotitis ; improved . Conservative measures  Afib /flutter ; on atenolol and dig. No anticoagulation due to low plt  Hyperuricemia ; allopurinol  RA ; on 5 mg prednisone   Left upper ext wound ; wound cares   Urinary incontinence and saddle anesthesia ; patient plans to follow up with urology about studies as an out patient  Huynh removed  Abnormal LFTs ; thought to be due to lymphoma . Monitor   Spoke with PMR 8/18 and hematology team on 8/15  Diffuse large B cell lymphoma ; dc neupogen. Hb above 8 and plt above 10 ( goal )  Rash : on zyrtec and kenalog  creme . Likely contact ?  Improved per patient  Dispo ; home today. Discussed with PMR 8/19      "

## 2017-08-21 ENCOUNTER — TELEPHONE (OUTPATIENT)
Dept: TRANSPLANT | Facility: CLINIC | Age: 57
End: 2017-08-21

## 2017-08-21 ENCOUNTER — OFFICE VISIT (OUTPATIENT)
Dept: ONCOLOGY | Facility: CLINIC | Age: 57
End: 2017-08-21
Attending: PHYSICIAN ASSISTANT
Payer: COMMERCIAL

## 2017-08-21 VITALS
TEMPERATURE: 97.3 F | HEART RATE: 74 BPM | WEIGHT: 121 LBS | SYSTOLIC BLOOD PRESSURE: 145 MMHG | DIASTOLIC BLOOD PRESSURE: 65 MMHG | BODY MASS INDEX: 25.29 KG/M2 | OXYGEN SATURATION: 96 %

## 2017-08-21 DIAGNOSIS — C83.398 DIFFUSE LARGE B-CELL LYMPHOMA OF EXTRANODAL SITE: ICD-10-CM

## 2017-08-21 DIAGNOSIS — I48.0 PAROXYSMAL ATRIAL FIBRILLATION (H): ICD-10-CM

## 2017-08-21 PROBLEM — Z76.89 HEALTH CARE HOME: Status: ACTIVE | Noted: 2017-08-21

## 2017-08-21 LAB
ALBUMIN SERPL-MCNC: 3.6 G/DL (ref 3.4–5)
ALP SERPL-CCNC: 160 U/L (ref 40–150)
ALT SERPL W P-5'-P-CCNC: 86 U/L (ref 0–50)
ANION GAP SERPL CALCULATED.3IONS-SCNC: 10 MMOL/L (ref 3–14)
AST SERPL W P-5'-P-CCNC: 52 U/L (ref 0–45)
BASOPHILS # BLD AUTO: 0.1 10E9/L (ref 0–0.2)
BASOPHILS NFR BLD AUTO: 1.3 %
BILIRUB SERPL-MCNC: 0.6 MG/DL (ref 0.2–1.3)
BUN SERPL-MCNC: 24 MG/DL (ref 7–30)
CALCIUM SERPL-MCNC: 9.7 MG/DL (ref 8.5–10.1)
CHLORIDE SERPL-SCNC: 105 MMOL/L (ref 94–109)
CO2 SERPL-SCNC: 25 MMOL/L (ref 20–32)
CREAT SERPL-MCNC: 0.57 MG/DL (ref 0.52–1.04)
DIFFERENTIAL METHOD BLD: ABNORMAL
EOSINOPHIL # BLD AUTO: 0.1 10E9/L (ref 0–0.7)
EOSINOPHIL NFR BLD AUTO: 2.8 %
ERYTHROCYTE [DISTWIDTH] IN BLOOD BY AUTOMATED COUNT: 23.6 % (ref 10–15)
GFR SERPL CREATININE-BSD FRML MDRD: >90 ML/MIN/1.7M2
GLUCOSE SERPL-MCNC: 131 MG/DL (ref 70–99)
HCT VFR BLD AUTO: 29.2 % (ref 35–47)
HGB BLD-MCNC: 9.5 G/DL (ref 11.7–15.7)
IMM GRANULOCYTES # BLD: 0.2 10E9/L (ref 0–0.4)
IMM GRANULOCYTES NFR BLD: 4.3 %
LDH SERPL L TO P-CCNC: 292 U/L (ref 81–234)
LYMPHOCYTES # BLD AUTO: 0.4 10E9/L (ref 0.8–5.3)
LYMPHOCYTES NFR BLD AUTO: 7.7 %
MAGNESIUM SERPL-MCNC: 1.8 MG/DL (ref 1.6–2.3)
MCH RBC QN AUTO: 31.8 PG (ref 26.5–33)
MCHC RBC AUTO-ENTMCNC: 32.5 G/DL (ref 31.5–36.5)
MCV RBC AUTO: 98 FL (ref 78–100)
MONOCYTES # BLD AUTO: 0.9 10E9/L (ref 0–1.3)
MONOCYTES NFR BLD AUTO: 19 %
NEUTROPHILS # BLD AUTO: 3.1 10E9/L (ref 1.6–8.3)
NEUTROPHILS NFR BLD AUTO: 64.9 %
NRBC # BLD AUTO: 0 10*3/UL
NRBC BLD AUTO-RTO: 1 /100
PLATELET # BLD AUTO: 104 10E9/L (ref 150–450)
POTASSIUM SERPL-SCNC: 4.2 MMOL/L (ref 3.4–5.3)
PROT SERPL-MCNC: 7.1 G/DL (ref 6.8–8.8)
RBC # BLD AUTO: 2.99 10E12/L (ref 3.8–5.2)
SODIUM SERPL-SCNC: 140 MMOL/L (ref 133–144)
WBC # BLD AUTO: 4.7 10E9/L (ref 4–11)

## 2017-08-21 PROCEDURE — 85025 COMPLETE CBC W/AUTO DIFF WBC: CPT | Performed by: PHYSICIAN ASSISTANT

## 2017-08-21 PROCEDURE — 83615 LACTATE (LD) (LDH) ENZYME: CPT | Performed by: PHYSICIAN ASSISTANT

## 2017-08-21 PROCEDURE — 62270 DX LMBR SPI PNXR: CPT | Mod: 53 | Performed by: PHYSICIAN ASSISTANT

## 2017-08-21 PROCEDURE — 80053 COMPREHEN METABOLIC PANEL: CPT | Performed by: PHYSICIAN ASSISTANT

## 2017-08-21 PROCEDURE — 62270 DX LMBR SPI PNXR: CPT | Mod: 53

## 2017-08-21 PROCEDURE — 99214 OFFICE O/P EST MOD 30 MIN: CPT | Mod: 25 | Performed by: PHYSICIAN ASSISTANT

## 2017-08-21 PROCEDURE — 83735 ASSAY OF MAGNESIUM: CPT | Performed by: PHYSICIAN ASSISTANT

## 2017-08-21 PROCEDURE — 25000128 H RX IP 250 OP 636: Mod: ZF | Performed by: PHYSICIAN ASSISTANT

## 2017-08-21 PROCEDURE — 99212 OFFICE O/P EST SF 10 MIN: CPT | Mod: ZF

## 2017-08-21 RX ORDER — HEPARIN SODIUM,PORCINE 10 UNIT/ML
5 VIAL (ML) INTRAVENOUS
Status: DISCONTINUED | OUTPATIENT
Start: 2017-08-21 | End: 2017-08-23 | Stop reason: HOSPADM

## 2017-08-21 RX ADMIN — SODIUM CHLORIDE, PRESERVATIVE FREE 5 ML: 5 INJECTION INTRAVENOUS at 17:00

## 2017-08-21 ASSESSMENT — PAIN SCALES - GENERAL: PAINLEVEL: MILD PAIN (3)

## 2017-08-21 NOTE — TELEPHONE ENCOUNTER
S: Patient is taking fluconazole and levofloxacin which was prescribed for immunosuppression secondary to chemo.  B: Patient has been in the hospital and had TCU stays from 5/29/17- 8/19/17 for large B cell lymphoma, bacterimia and extravasation to left upper arm.  A: In the medication data base it showed that there is a possible severe interaction between fluconazole and levofloxacin causing prolonged QTc interval.  R: Please advise should she be on both of these medications or is it for a short period of time? Sharda peterson RN

## 2017-08-21 NOTE — MR AVS SNAPSHOT
After Visit Summary   8/21/2017    Amelia Michel    MRN: [id number]           Patient Information     Date Of Birth          1960        Visit Information        Provider Department      8/21/2017 5:40 PM Kalpana Alvarado PA-C [facility]        Today's Diagnoses     Diffuse large B-cell lymphoma of extranodal site (H)        Paroxysmal atrial fibrillation (H)           Follow-ups after your visit        Your next 10 appointments already scheduled     Aug 23, 2017  8:30 AM CDT   (Arrive by 8:15 AM)   XR LUMBAR PUNCTURE SPINAL TAP DIAGNOSTIC with BARAMXR3, ABRAM IMAGING NURSE,  NEURO RAD   [facility] ([facility])    [address] [id number] [phone]           For nerve root injection, please send or bring copies of any MRIs or other scans you have had.  Bring a list of your current medicines to your exam. (Include vitamins, minerals and over-the-counter medicines.) Leave your valuables at home.  Plan to have someone drive you home afterward.  Stop taking the following medicines (but talk to your doctor first):   If you take blood thinners, you may need to stop taking them a few days before treatment. Talk to your doctor before stopping these medicines.Stop taking Coumadin (warfarin) 3 days before treatment. Restart the day after treatment.   If you take Plavix, Ticlid, Pletal or Persantine, please ask your doctor if you should stop these medicines. You may need extra tests on the morning of your scan.   If you take Xarelto (Rivaroxaban), you may need to stop taking it 24 hours before treatment. Talk to your doctor before stopping this medicine. Restart the day after treatment.  You may take your other medicines as normal.  Stop all food and drink (including water) 3 hours before your test or treatment.  Please tell the doctor:   If you are allergic to X-ray dye (contrast fluid).   If you may be  pregnant.  Injections take about 30 to 45 minutes. Most people spend up to 2 hours in the clinic or hospital.  Please call the Imaging Department at your exam site with any questions.            Aug 24, 2017  7:00 AM CDT   [facility] Lab Draw with  [facility] LAB DRAW   [facility] Lab Draw ([facility])    [address] [place] [id number] [phone]            Aug 24, 2017  7:30 AM CDT   Infusion 360 with  ONCOLOGY INFUSION, UC 13 ATC   [facility] ([facility])    [address] [place] [id number] [phone]            Aug 24, 2017  8:00 AM CDT   (Arrive by 7:45 AM)   Office Visit with Maria Esther Pedroza [facility] ([facility])    [address] [place] [id number] [phone]           Bring a current list of meds and any records pertaining to this visit. For Physicals, please bring immunization records and any forms needing to be filled out. Please arrive 10 minutes early to complete paperwork.            Sep 15, 2017 10:00 AM CDT   (Arrive by 9:45 AM)   New Patient Visit with Pj Cunha MD   [facility] ([facility])    [address] [place] [id number] [phone]            Sep 27, 2017  1:00 PM CDT   (Arrive by 12:45 PM)   Implanted Defibulator with  Cv Device 1   [facility] ([facility])    [address] [place] [id number] [phone]            Sep 27, 2017  1:30 PM CDT   (Arrive by 1:15 PM)   RETURN ARRHYTHMIA with Juan Eaton MD   [facility] ([facility])    [address] [place] [id number] [phone]              Who to contact     If you have questions or need follow up information about today's clinic  visit or your schedule please contact [facility] directly at [phone].  Normal or non-critical lab and imaging results will be communicated to you by MyChart, letter or phone within 4 business days after the clinic has received the results. If you do not hear from us within 7 days, please contact the clinic through [employer] or phone. If you have a critical or abnormal lab result, we will notify you by phone as soon as possible.  Submit refill requests through [employer] or call your pharmacy and they will forward the refill request to us. Please allow 3 business days for your refill to be completed.          Additional Information About Your Visit        [employer] Information     [employer] gives you secure access to your electronic health record. If you see a primary care provider, you can also send messages to your care team and make appointments. If you have questions, please call your primary care clinic.  If you do not have a primary care provider, please call [phone] and they will assist you.        Care EveryWhere ID     This is your Care EveryWhere ID. This could be used by other organizations to access your [place] medical records  [id number]        Your Vitals Were     Pulse Temperature Pulse Oximetry BMI (Body Mass Index)          74 97.3  F (36.3  C) (Axillary) 96% 25.29 kg/m2         Blood Pressure from Last 3 Encounters:   08/21/17 145/65   08/19/17 156/65   08/12/17 147/82    Weight from Last 3 Encounters:   08/21/17 54.9 kg (121 lb)   08/18/17 54.5 kg (120 lb 3.2 oz)   08/12/17 56 kg (123 lb 8 oz)              We Performed the Following     CBC with platelets differential     Comprehensive metabolic panel     Lactate Dehydrogenase     Magnesium        Primary Care Provider Office Phone # Fax #    Comfort Sabillon [phone] [address] [address]        Equal Access to Services     CATINA BLACKWELL: khari Nayak  fred durga santizo ah. So [facility] [phone].    ATENCIÓN: Si marlee gutierrez, tiene a sarmiento disposición servicios gratuitos de asistencia lingüística. Taylor al [phone].    We comply with applicable federal civil rights laws and [place] laws. We do not discriminate on the basis of race, color, national origin, age, disability sex, sexual orientation or gender identity.            Thank you!     Thank you for choosing [facility]  for your care. Our goal is always to provide you with excellent care. Hearing back from our patients is one way we can continue to improve our services. Please take a few minutes to complete the written survey that you may receive in the mail after your visit with us. Thank you!             Your Updated Medication List - Protect others around you: Learn how to safely use, store and throw away your medicines at www.disposemymeds.org.          This list is accurate as of: 8/21/17 11:59 PM.  Always use your most recent med list.                   Brand Name Dispense Instructions for use Diagnosis    acetaminophen 325 MG tablet    TYLENOL    100 tablet    Take 2 tablets (650 mg) by mouth every 4 hours as needed for mild pain    Rheumatoid arthritis involving multiple sites with positive rheumatoid factor (H)       acyclovir 400 MG tablet    ZOVIRAX    60 tablet    Take 1 tablet (400 mg) by mouth 2 times daily    Diffuse large B-cell lymphoma, unspecified body region (H)       allopurinol 300 MG tablet    ZYLOPRIM    30 tablet    Take 1 tablet (300 mg) by mouth daily    Diffuse large B-cell lymphoma, unspecified body region (H)       ascorbic acid 500 MG Tabs     30 tablet    Take 1 tablet (500 mg) by mouth daily    Diffuse large B-cell lymphoma, unspecified body region (H)       atenolol 25 MG tablet    TENORMIN    60 tablet    Take 1 tablet (25 mg) by mouth 2 times daily    Atrial tachycardia (H)       calcium-vitamin  D 600-400 MG-UNIT per tablet    CALTRATE    60 tablet    Take 2 tablets by mouth every morning    Diffuse large B-cell lymphoma, unspecified body region (H)       cefTRIAXone 1 GM vial    ROCEPHIN    30 mL    Inject 1 g (1,000 mg) into the muscle daily for 3 days    Gram-positive bacteremia       digoxin 125 MCG tablet    LANOXIN    30 tablet    Take 1 tablet (125 mcg) by mouth daily    Atrial tachycardia (H)       fluconazole 200 MG tablet    DIFLUCAN    30 tablet    Take 1 tablet (200 mg) by mouth daily    Diffuse large B-cell lymphoma, unspecified body region (H)       folic acid 1 MG tablet    FOLVITE    30 tablet    Take 1 tablet (1 mg) by mouth daily    Diffuse large B-cell lymphoma, unspecified body region (H)       gabapentin 100 MG capsule    NEURONTIN    120 capsule    Take 2 capsules (200 mg) by mouth 3 times daily    Critical illness myopathy       lactobacillus rhamnosus (GG) capsule     60 capsule    Take 1 capsule by mouth 2 times daily    Physical deconditioning       levofloxacin 250 MG tablet    LEVAQUIN    30 tablet    Take 1 tablet (250 mg) by mouth daily    Diffuse large B-cell lymphoma, unspecified body region (H)       multivitamin, therapeutic with minerals Tabs tablet     30 each    Take 1 tablet by mouth daily    Diffuse large B-cell lymphoma, unspecified body region (H)       potassium chloride SA 20 MEQ CR tablet    K-DUR/KLOR-CON M    90 tablet    Take 1 tablet (20 mEq) by mouth daily    Diffuse large B-cell lymphoma, unspecified body region (H)       predniSONE 5 MG tablet    DELTASONE    30 tablet    Take 1 tablet (5 mg) by mouth daily    Diffuse large B-cell lymphoma, unspecified body region (H)       thiamine 50 MG Tabs     30 tablet    Take 1 tablet (50 mg) by mouth daily    Diffuse large B-cell lymphoma, unspecified body region (H)       traMADol 50 MG tablet    ULTRAM    28 tablet    Take 0.5 tablets (25 mg) by mouth every 6 hours as needed for moderate pain    Diffuse large  B-cell lymphoma, unspecified body region (H)       triamcinolone 0.5 % Oint ointment    KENALOG    30 g    Apply 1 g topically 3 times daily    Allergic reaction, subsequent encounter

## 2017-08-21 NOTE — PROGRESS NOTES
"IRF-JEANNETTE CLARIFICATION NOTE FOR ADMIT ASSESSMENT PERIOD  Lowest score for each FIM item supported by available charting    Bathing: FIM 4: Assist for 20% of bathing task  Bowel: FIM 1: Charting by NA indicates pt required total assist following incontinent/accident  Bowel Number of Accidents: 1  Locomotion distance:  125 feet  Memory: FIM 5: Charting describes pt as \"forgetful\"; assist noted less than 10% of time for basic memory    IRF-JEANNETTE CLARIFICATION NOTE FOR DISCHARGE  Lowest score for each FIM item supported by available charting *FIM scores taken from charting on 8/6/17    Toilet transfer: FIM 5: Charting per NA indicates set up for toilet transfer  Locomotion distance: 225 feet  Locomotion: FIM 5: Supervision/SBA for 225 feet ambulation    "

## 2017-08-21 NOTE — NURSING NOTE
"Oncology Rooming Note    August 21, 2017 5:17 PM   Amelia Michel is a 56 year old female who presents for:    Chief Complaint   Patient presents with     Blood Draw     Picc labs collected.      Oncology Clinic Visit     LP      Initial Vitals: /65 (BP Location: Left leg)  Pulse 74  Temp 97.3  F (36.3  C) (Axillary)  Wt 54.9 kg (121 lb)  SpO2 96%  BMI 25.29 kg/m2 Estimated body mass index is 25.29 kg/(m^2) as calculated from the following:    Height as of 8/12/17: 1.473 m (4' 10\").    Weight as of this encounter: 54.9 kg (121 lb). Body surface area is 1.5 meters squared.  Mild Pain (3) Comment: Data Unavailable   No LMP recorded. Patient is postmenopausal.  Allergies reviewed: Yes  Medications reviewed: Yes    Medications: Medication refills not needed today.  Pharmacy name entered into [employer]:    [facility] [place], [place] - [address] [facility] [place], [place] - [address] [place] [address] PHARMACY [facility] [place], [place] - [address] [facility]ION    Clinical concerns: LP valarie was notified.    7  minutes for nursing intake (face to face time)     Tammi Santos MA              "

## 2017-08-23 NOTE — PROGRESS NOTES
[facility]  FOLLOW-UP VISIT NOTE  Date of visit: 8-21-17          REASON FOR VISIT: DLBCL, here for LP and hospital follow up    Disease History:  1. Complicated patient with history of Rheumatoid Arthritis status post long term treatment with methotrexate with recent significant complicated course with cardiac arrest, admission with hypotension, sepsis, ICU stay and intubation with subsequent additional readmission from TCU in 6/2017 for FUO with extensive work-up with eventual finding of progression cytopenias with eventual lymphoma diagnosis  2. Bone Marrow Biopsy: 7/12/2017: Highly suspicious for involvement by B cell lymphoma with foci of large atypical CD20+ cells  3. PET CT 7/19/2017: Extensive activity: Right paratracheal lesion with SUV 28, many liver lesions with SUV 15, cardiac lesion intraarterial septum, numerous bone lesions.  4. 7/21 Liver Biopsy: Consistent with Diffuse Large B Cell Lymphoma non-germinal center phenotype  -- FISH for myc, bcl2, bcl6 negative for double-hit lymphoma  5. IPI based on Age < 60 (0 points) + PS 2 (1 + point) + Stage IV Disease (1 point) + Extranodal disease > 1 site (1 point) + elevated LDH (1 point) = 4 Poor Risk Disease  6. Cycle 1 given as R-EPOCH Day 1 = 7/27/2017    INTERVAL HISTORY: Amelia is here today with her SO for her LP and for hospital follow up. She really has been feeling quite a bit better with time, appetite improving and strength better.  She continues to use her wheeled walker when ambulating, but is able to go without it for short distances.  No recent f/s/c.  No cough/dyspnea.  BM daily, no blood. No  issues. No edema.  Rash on chest /upper arms improving, no longer taking Zofran or using the topical cream.     EXAM:  /65 (BP Location: Left leg)  Pulse 74  Temp 97.3  F (36.3  C) (Axillary)  Wt 54.9 kg (121 lb)  SpO2 96%  BMI 25.29 kg/m2  Wt Readings from Last 4 Encounters:   08/21/17 54.9 kg (121  lb)   08/18/17 54.5 kg (120 lb 3.2 oz)   08/12/17 56 kg (123 lb 8 oz)   08/08/17 55.8 kg (123 lb 1.6 oz)     Vital signs were reviewed.   Patient alert and oriented x3.   PERRLA. EOMI. No scleral icterus noted. OP without thrush/sores.  Neck exam: No palpable cervical, supraclavicular or axillary nodes bilaterally.   Heart: chest scar. RRR no murmurs noted.   Lungs: clear to auscultation bilaterally.  No crackles or wheezing.   Abd: positive bowel sounds in all four quadrants.  No tenderness to palpation.  No hepatomegaly.   Extremities: No lower extremity edema.   Neuro: grossly intact.   Mood and affect is stable  Skin: barely notable flushing on chest and small pin point rash on upper arms  Left arm shows dressing with no signs of infection on arm,  showed me daily picture    LABS:   =   8/14/2017 07:24 8/16/2017 08:11 8/18/2017 06:06 8/21/2017 16:59   WBC 3.9 (L) 5.3 3.7 (L) 4.7   Hemoglobin 10.3 (L) 9.8 (L) 9.7 (L) 9.5 (L)   Hematocrit 29.0 (L) 30.4 (L) 29.9 (L) 29.2 (L)   Platelet Count 33 (LL) 49 (LL) 66 (L) 104 (L)   RBC Count 3.19 (L) 3.26 (L) 3.16 (L) 2.99 (L)   MCV 91 93 95 98      8/14/2017 07:24 8/16/2017 08:11 8/18/2017 06:06 8/21/2017 16:59   Sodium 140 142 143 140   Potassium 3.8 3.9 3.8 4.2   Chloride 108 110 (H) 108 105   Carbon Dioxide 23 21 25 25   Urea Nitrogen 13 12 18 24   Creatinine 0.46 (L) 0.43 (L) 0.46 (L) 0.57   GFR Estimate >90... >90 >90 >90   GFR Estimate If Black >90... >90 >90 >90   Calcium 8.8 8.8 9.2 9.7   Anion Gap 9 11 10 10   Magnesium    1.8   Albumin 3.3 (L) 3.3 (L) 3.4 3.6   Protein Total 6.7 (L) 6.5 (L) 6.6 (L) 7.1   Bilirubin Total 0.8 0.9 0.6 0.6   Alkaline Phosphatase 194 (H) 180 (H) 162 (H) 160 (H)    (H) 87 (H) 83 (H) 86 (H)   AST 46 (H) 40 42 52 (H)   Bilirubin Direct  0.3 (H) 0.3 (H)    Lactate Dehydrogenase    292 (H)     ASSESSMENT/PLAN: Amelia is a 56 year old female with h/o RA and new diagnosis of stage IVB DLBCL with high risk.  Presented with  cardiac arrest and significant cytopenias.     ONC: stage IVB, high risk.  Treated with R-EPOCH but will move forward with R-CHOP (for five more cycles).  She will receive HD MTX inpatient on days 15 of cycle 2, 4 and 6 due to high IPI.  Today her counts are showing great recovery from chemo (and presented with pancytopenia) and clinically she is getting stronger.  She is medically fit to go forward with C2 of R-CHOP on Thursday and then will be admitted on 9/6 for HD MTX.   -LP attempted and not successful (see note below) will schedule in radiology  -sent home with 24 hour urine for creat clearance per dr Panda prior note  -cont allopurinol    HEME: presented with pancytopenia, worsened with chemo and now recovering.  No current need for transfusion support.     ID: currently on rocephin for strep mitis through 8/23.  She also started on her levaquin 250 mg, but asked her to hold this until she finishes her IV rocephin  -cont ppx flucon + ACV    CV: no current chest pain, a fib.  Cont dig and atenolol.  Sees cardiology end of September.  Will need to consider anticoagulation now that platelets are trending upwards.     : h/o saddle anesthesia and urinary incontinence at night mostly, wears depends.  Will need urology and urodynamic studies outpatient, will put in referral.     LFT elevation: likely related to liver involvement.  Continues to trend down.     H/o RA: 30 + years of RA, long history of therapy with MTX, pred and HCQ.  Continue 5 mg pred daily.  9/15 f/up with Dr Alphonse FRANCIS extravasation:  doing dressing changes.  Not assessed today, but he showed me the daily pictures on phone.  Still extensive wound with tendon exposure. No signs of infection.     Rosalinda Alvarado PA-C      BMT ONC Adult Lumbar Puncture Procedure Note    August 23, 2017    Procedure: Lumbar Puncture to obtain CSF     Diagnosis: DLBCL    Informed Consent: Procedure, benefits, risks, and alternatives explained to the  patient who verbalized understanding of the information and agreed to proceed with lumbar puncture. Risks include bleeding, headache, and or infection.  Patient safety checklist completed.    Labs: Reviewed:        Description:. The patient was seated at the bedside with knees dangling. The L4-5 disc space was prepped and draped in a sterile fashion. Local anesthesia with 2 mL 1% preservative-free lidocaine was used. A 22 gauge, 4 inch needle was attempted but unable to get through bony prominence.  Multiple attempts were made by myself. The needle was removed and a bandage was applied to the site.  Patient tolerated well.    LP was not successful today.  Amelia and I had discussed with her RA and degenerative spine that this may be a challenging LP.  She tolerated attempts without issues today, but unfortunately I was unable to get past bony ridge to get CSF.  Procedure was aborted and will be set up in IR tomorrow.     Kalpana Alvarado

## 2017-08-24 ENCOUNTER — APPOINTMENT (OUTPATIENT)
Dept: LAB | Facility: CLINIC | Age: 57
End: 2017-08-24
Attending: INTERNAL MEDICINE
Payer: COMMERCIAL

## 2017-08-24 ENCOUNTER — OFFICE VISIT (OUTPATIENT)
Dept: PHARMACY | Facility: CLINIC | Age: 57
End: 2017-08-24
Payer: COMMERCIAL

## 2017-08-24 ENCOUNTER — INFUSION THERAPY VISIT (OUTPATIENT)
Dept: ONCOLOGY | Facility: CLINIC | Age: 57
End: 2017-08-24
Attending: INTERNAL MEDICINE
Payer: COMMERCIAL

## 2017-08-24 VITALS
BODY MASS INDEX: 25.64 KG/M2 | RESPIRATION RATE: 16 BRPM | DIASTOLIC BLOOD PRESSURE: 64 MMHG | OXYGEN SATURATION: 99 % | HEART RATE: 67 BPM | SYSTOLIC BLOOD PRESSURE: 133 MMHG | WEIGHT: 122.7 LBS | TEMPERATURE: 97 F

## 2017-08-24 DIAGNOSIS — M06.9 RHEUMATOID ARTHRITIS, INVOLVING UNSPECIFIED SITE, UNSPECIFIED RHEUMATOID FACTOR PRESENCE: ICD-10-CM

## 2017-08-24 DIAGNOSIS — I49.9 CARDIAC ARRHYTHMIA, UNSPECIFIED CARDIAC ARRHYTHMIA TYPE: ICD-10-CM

## 2017-08-24 DIAGNOSIS — C83.398 DIFFUSE LARGE B-CELL LYMPHOMA OF EXTRANODAL SITE: Primary | ICD-10-CM

## 2017-08-24 DIAGNOSIS — E63.9 NUTRITIONAL DEFICIENCY: ICD-10-CM

## 2017-08-24 DIAGNOSIS — D84.9 IMMUNOCOMPROMISED STATE (H): ICD-10-CM

## 2017-08-24 DIAGNOSIS — C83.30 DIFFUSE LARGE B-CELL LYMPHOMA, UNSPECIFIED BODY REGION (H): Primary | ICD-10-CM

## 2017-08-24 DIAGNOSIS — G62.9 NEUROPATHY: ICD-10-CM

## 2017-08-24 LAB
ACANTHOCYTES BLD QL SMEAR: SLIGHT
ALBUMIN SERPL-MCNC: 3.6 G/DL (ref 3.4–5)
ALP SERPL-CCNC: 136 U/L (ref 40–150)
ALT SERPL W P-5'-P-CCNC: 74 U/L (ref 0–50)
ANION GAP SERPL CALCULATED.3IONS-SCNC: 7 MMOL/L (ref 3–14)
ANISOCYTOSIS BLD QL SMEAR: ABNORMAL
AST SERPL W P-5'-P-CCNC: 46 U/L (ref 0–45)
BASOPHILS # BLD AUTO: 0.2 10E9/L (ref 0–0.2)
BASOPHILS NFR BLD AUTO: 7.3 %
BILIRUB SERPL-MCNC: 0.6 MG/DL (ref 0.2–1.3)
BUN SERPL-MCNC: 17 MG/DL (ref 7–30)
CALCIUM SERPL-MCNC: 9 MG/DL (ref 8.5–10.1)
CHLORIDE SERPL-SCNC: 106 MMOL/L (ref 94–109)
CO2 SERPL-SCNC: 27 MMOL/L (ref 20–32)
COLLECT DURATION TIME UR: 24 H
CREAT 24H UR-MRATE: 0.42 G/(24.H) (ref 0.8–1.8)
CREAT SERPL-MCNC: 0.53 MG/DL (ref 0.52–1.04)
CREAT UR-MCNC: 24 MG/DL
DIFFERENTIAL METHOD BLD: ABNORMAL
EOSINOPHIL # BLD AUTO: 0.2 10E9/L (ref 0–0.7)
EOSINOPHIL NFR BLD AUTO: 6.4 %
ERYTHROCYTE [DISTWIDTH] IN BLOOD BY AUTOMATED COUNT: 25.2 % (ref 10–15)
GFR SERPL CREATININE-BSD FRML MDRD: >90 ML/MIN/1.7M2
GLUCOSE SERPL-MCNC: 103 MG/DL (ref 70–99)
HCT VFR BLD AUTO: 29.2 % (ref 35–47)
HGB BLD-MCNC: 9.4 G/DL (ref 11.7–15.7)
LYMPHOCYTES # BLD AUTO: 0.5 10E9/L (ref 0.8–5.3)
LYMPHOCYTES NFR BLD AUTO: 14.7 %
MACROCYTES BLD QL SMEAR: PRESENT
MCH RBC QN AUTO: 32.3 PG (ref 26.5–33)
MCHC RBC AUTO-ENTMCNC: 32.2 G/DL (ref 31.5–36.5)
MCV RBC AUTO: 100 FL (ref 78–100)
METAMYELOCYTES # BLD: 0.1 10E9/L
METAMYELOCYTES NFR BLD MANUAL: 2.8 %
MICROCYTES BLD QL SMEAR: PRESENT
MONOCYTES # BLD AUTO: 0.5 10E9/L (ref 0–1.3)
MONOCYTES NFR BLD AUTO: 16.5 %
NEUTROPHILS # BLD AUTO: 1.6 10E9/L (ref 1.6–8.3)
NEUTROPHILS NFR BLD AUTO: 52.3 %
NRBC # BLD AUTO: 0.1 10*3/UL
NRBC BLD AUTO-RTO: 2 /100
PLATELET # BLD AUTO: 130 10E9/L (ref 150–450)
POIKILOCYTOSIS BLD QL SMEAR: SLIGHT
POLYCHROMASIA BLD QL SMEAR: ABNORMAL
POTASSIUM SERPL-SCNC: 3.9 MMOL/L (ref 3.4–5.3)
PROT 24H UR-MRATE: 0.1 G/(24.H) (ref 0.04–0.23)
PROT SERPL-MCNC: 6.8 G/DL (ref 6.8–8.8)
PROT UR-MCNC: 0.06 G/L
PROT/CREAT 24H UR: 0.24 G/G CR (ref 0–0.2)
RBC # BLD AUTO: 2.91 10E12/L (ref 3.8–5.2)
SODIUM SERPL-SCNC: 141 MMOL/L (ref 133–144)
SPECIMEN VOL UR: 1790 ML
SPHEROCYTES BLD QL SMEAR: SLIGHT
WBC # BLD AUTO: 3.1 10E9/L (ref 4–11)

## 2017-08-24 PROCEDURE — 81050 URINALYSIS VOLUME MEASURE: CPT | Performed by: INTERNAL MEDICINE

## 2017-08-24 PROCEDURE — 96411 CHEMO IV PUSH ADDL DRUG: CPT

## 2017-08-24 PROCEDURE — 99605 MTMS BY PHARM NP 15 MIN: CPT | Performed by: PHARMACIST

## 2017-08-24 PROCEDURE — 96415 CHEMO IV INFUSION ADDL HR: CPT

## 2017-08-24 PROCEDURE — 25000132 ZZH RX MED GY IP 250 OP 250 PS 637: Mod: ZF | Performed by: INTERNAL MEDICINE

## 2017-08-24 PROCEDURE — 85025 COMPLETE CBC W/AUTO DIFF WBC: CPT | Performed by: INTERNAL MEDICINE

## 2017-08-24 PROCEDURE — 99607 MTMS BY PHARM ADDL 15 MIN: CPT | Performed by: PHARMACIST

## 2017-08-24 PROCEDURE — 96417 CHEMO IV INFUS EACH ADDL SEQ: CPT

## 2017-08-24 PROCEDURE — 96377 APPLICATON ON-BODY INJECTOR: CPT | Mod: 59

## 2017-08-24 PROCEDURE — 84156 ASSAY OF PROTEIN URINE: CPT | Performed by: INTERNAL MEDICINE

## 2017-08-24 PROCEDURE — 96375 TX/PRO/DX INJ NEW DRUG ADDON: CPT

## 2017-08-24 PROCEDURE — 96413 CHEMO IV INFUSION 1 HR: CPT

## 2017-08-24 PROCEDURE — 96367 TX/PROPH/DG ADDL SEQ IV INF: CPT

## 2017-08-24 PROCEDURE — 25000128 H RX IP 250 OP 636: Mod: ZF | Performed by: INTERNAL MEDICINE

## 2017-08-24 PROCEDURE — 80053 COMPREHEN METABOLIC PANEL: CPT | Performed by: INTERNAL MEDICINE

## 2017-08-24 RX ORDER — PREDNISONE 50 MG/1
50 TABLET ORAL 2 TIMES DAILY
Qty: 10 TABLET | Refills: 0 | Status: SHIPPED | OUTPATIENT
Start: 2017-08-24 | End: 2017-08-29

## 2017-08-24 RX ORDER — ACETAMINOPHEN 325 MG/1
650 TABLET ORAL ONCE
Status: COMPLETED | OUTPATIENT
Start: 2017-08-24 | End: 2017-08-24

## 2017-08-24 RX ORDER — PALONOSETRON 0.05 MG/ML
0.25 INJECTION, SOLUTION INTRAVENOUS ONCE
Status: COMPLETED | OUTPATIENT
Start: 2017-08-24 | End: 2017-08-24

## 2017-08-24 RX ORDER — HEPARIN SODIUM,PORCINE 10 UNIT/ML
5 VIAL (ML) INTRAVENOUS
Status: DISCONTINUED | OUTPATIENT
Start: 2017-08-24 | End: 2017-08-24 | Stop reason: HOSPADM

## 2017-08-24 RX ORDER — EPINEPHRINE 0.3 MG/.3ML
INJECTION SUBCUTANEOUS
Status: DISCONTINUED
Start: 2017-08-24 | End: 2017-08-24 | Stop reason: WASHOUT

## 2017-08-24 RX ORDER — DIPHENHYDRAMINE HCL 25 MG
50 CAPSULE ORAL ONCE
Status: COMPLETED | OUTPATIENT
Start: 2017-08-24 | End: 2017-08-24

## 2017-08-24 RX ADMIN — RITUXIMAB 560 MG: 10 INJECTION, SOLUTION INTRAVENOUS at 09:54

## 2017-08-24 RX ADMIN — DOXORUBICIN HYDROCHLORIDE 75 MG: 2 INJECTION, SOLUTION INTRAVENOUS at 08:56

## 2017-08-24 RX ADMIN — SODIUM CHLORIDE, PRESERVATIVE FREE 5 ML: 5 INJECTION INTRAVENOUS at 07:16

## 2017-08-24 RX ADMIN — SODIUM CHLORIDE, PRESERVATIVE FREE 5 ML: 5 INJECTION INTRAVENOUS at 07:15

## 2017-08-24 RX ADMIN — ACETAMINOPHEN 650 MG: 325 TABLET ORAL at 09:35

## 2017-08-24 RX ADMIN — SODIUM CHLORIDE 250 ML: 9 INJECTION, SOLUTION INTRAVENOUS at 08:14

## 2017-08-24 RX ADMIN — SODIUM CHLORIDE, PRESERVATIVE FREE 5 ML: 5 INJECTION INTRAVENOUS at 12:50

## 2017-08-24 RX ADMIN — SODIUM CHLORIDE 150 MG: 9 INJECTION, SOLUTION INTRAVENOUS at 08:17

## 2017-08-24 RX ADMIN — PEGFILGRASTIM 6 MG: KIT SUBCUTANEOUS at 12:31

## 2017-08-24 RX ADMIN — CYCLOPHOSPHAMIDE 1125 MG: 1 INJECTION, POWDER, FOR SOLUTION INTRAVENOUS; ORAL at 09:16

## 2017-08-24 RX ADMIN — DIPHENHYDRAMINE HYDROCHLORIDE 50 MG: 25 CAPSULE ORAL at 09:36

## 2017-08-24 RX ADMIN — VINCRISTINE SULFATE 2 MG: 1 INJECTION, SOLUTION INTRAVENOUS at 09:05

## 2017-08-24 RX ADMIN — PALONOSETRON HYDROCHLORIDE 0.25 MG: 0.25 INJECTION INTRAVENOUS at 08:14

## 2017-08-24 ASSESSMENT — PAIN SCALES - GENERAL: PAINLEVEL: MILD PAIN (3)

## 2017-08-24 NOTE — PROGRESS NOTES
Infusion Nursing Note:  Amelia Michel presents today for Cycle 2 Day 1 Rituxan, Doxorubicin, Vincristine, Cytoxan, Neulasta Onbody.    Patient seen by provider today: No. Pt saw CRISTA Ojeda, on 8/21 and pt denies any changes since this visit.    present during visit today: Not Applicable.    Note: Patient new to oncology infusion room and is receiving R-CHOP for the first time. Pt did receive R-EPOCH as inpatient. Pt did react to Rituxan during Cycle 1, so Rituxan titrated by 50cc/hr q30 minutes today to a maximum rate of 400cc/hr. Pt oriented to infusion room and call light. New patient teaching done previously. Pt received new pt folder prior to coming to infusion. Writer reinforced chemotherapy teaching/side effects and schedule. Patient instructed to call triage with questions/concerns or if he/she has chills and/or temperature greater than or equal to 100.5. Triage number: [phone]; After hours, weekends, or holidays, call main hospital  at [phone] and ask for oncology doctor on call.    Intravenous Access:  PICC. Dressing and caps changed today.    Treatment Conditions:  Lab Results   Component Value Date    HGB 9.4 08/24/2017     Lab Results   Component Value Date    WBC 3.1 08/24/2017      Lab Results   Component Value Date    ANEU 1.6 08/24/2017     Lab Results   Component Value Date     08/24/2017      Lab Results   Component Value Date     08/24/2017                   Lab Results   Component Value Date    POTASSIUM 3.9 08/24/2017           Lab Results   Component Value Date    MAG 1.8 08/21/2017            Lab Results   Component Value Date    CR 0.53 08/24/2017                   Lab Results   Component Value Date    VIKTORIYA 9.0 08/24/2017                Lab Results   Component Value Date    BILITOTAL 0.6 08/24/2017           Lab Results   Component Value Date    ALBUMIN 3.6 08/24/2017                    Lab Results   Component Value Date    ALT 74 08/24/2017            Lab Results   Component Value Date    AST 46 08/24/2017     Results reviewed, labs MET treatment parameters, ok to proceed with treatment.  ECHO/MUGA completed 6/19/17  EF 50-55%.      Post Infusion Assessment:  Patient tolerated infusion without incident.  Blood return noted pre and post infusion.  Blood return noted during administration of Doxorubicin every 2cc and during administration of Vincristine pre, mid, and post.  Site patent and intact, free from redness, edema or discomfort.  No evidence of extravasations.  PICC flushed per protocol.    Discharge Plan:   Prescription refills given for prednisone, Levaquin.  Patient and/or family verbalized understanding of discharge instructions and all questions answered.  Copy of AVS reviewed with patient and/or family.  Patient will return 9/6 for next appointment (provider visit and admission for chemotherapy).  Patient discharged in stable condition accompanied by: .  Departure Mode: Ambulatory with walker.      Neulasta Onpro On-Body injector applied to left arm at 1235 with light facing down.  Writer discussed Neulasta injection would start on 8/25 at 1535, approximately 27 hours after application applied today.  Written and Verbal instruction reviewed with patient.  Pt instructed when the dose delivery starts, it will take about 45 minutes to complete.  Pt aware Neulasta Onpro On-Body should have green flashing light and to call triage or on-call MD if injector flashes red or appears to be leaking. Pt aware to keep Onpro On-Body Neualsta 4 inches away from electrical equipment and to avoid showering 4 hours prior to injection.   Neulasta Onpro Lot number: [id number].    SARAY PRUITT RN

## 2017-08-24 NOTE — PATIENT INSTRUCTIONS
We applied the Neulasta Onpro On-body injector today at about 12:35pm. The injection will start 27 hours after application, at 3:35pm tomorrow. The Neulasta Onpro On-body injection takes 45 minutes once it begins, and should have a green flashing light throughout. If it flashes red, call triage or the on-call number on your discharge paperwork. The injection will be done at about 4:30pm tomorrow. You can then remove the device and dispose of it. Keep electronics 4 inches away from Onpro injector. Avoid showering 4 hours prior to injection. Refer to the written instructions given to you if you have any questions or call triage/on-call number.    Contact Numbers    [facility] [place]: [phone]  [facility] Triage:  [phone]    Call triage with chills and/or temperature greater than or equal to 100.5, uncontrolled nausea/vomiting, diarrhea, constipation, dizziness, shortness of breath, chest pain, bleeding, unexplained bruising, or any new/concerning symptoms, questions/concerns.     If you are having any concerning symptoms or wish to speak to a provider before your next infusion visit, please call your care coordinator or triage to notify them so we can adequately serve you.       After Hours: [phone]    If after hours, weekends, or holidays, call main hospital  and ask for Oncology doctor on call.           August 2017 Sunday Monday Tuesday Wednesday Thursday Friday Saturday             1     IP TREATMENT    6:00 AM   (30 min.)   Selvin Dean, PT   [facility], [place], Physical Therapy 2     IP TREATMENT    6:00 AM   (30 min.)   Isha Tadeo, OT   [facility], [place], Occupational Therapy     IP TREATMENT    6:00 AM   (30 min.)   Selvin Dean, PT   [facility] [place], Physical Therapy 3     IP TREATMENT    6:00 AM   (30 min.)   Selvin Dean, PT   [facility], [place], Physical Therapy     IP TREATMENT    6:00 AM   (30 min.)   Isha Tadeo, OT   [facility], [place], Occupational Therapy 4     Admission   11:36 AM    Mary Babcock MD   [facility]   (Discharge: 8/8/2017) 5     IP EVALUATION    7:00 AM   (60 min.)   Gala Arvizu, OT   [facility], Acute Rehab OT     IP EVALUATION    9:00 AM   (45 min.)   Amrita Lomeli, PT   [facility], Acute Rehab PT     IP EVALUATION    2:15 PM   (45 min.)   Gala Arvizu, OT   [facility], Acute Rehab OT     IP EVALUATION    3:00 PM   (45 min.)   Amrita Lomeli, PT   [facility], Acute Rehab PT   6     IP TREATMENT    7:00 AM   (60 min.)   Gala Arvizu, OT   [facility], Acute Rehab OT     IP TREATMENT    9:00 AM   (45 min.)   Amrita Lomeli, PT   [facility], Acute Rehab PT     IP TREATMENT    3:00 PM   (45 min.)   Amrita Lomeli, PT   [facility], Acute Rehab PT 7     IP TREATMENT    8:15 AM   (60 min.)   Catrina Wyatt, PT   [facility], Acute Rehab PT     IP TREATMENT   10:00 AM   (60 min.)   Luz Chambers, OT   [facility], Acute Rehab OT     IP TREATMENT   11:00 AM   (30 min.)   Catrina Wyatt, PT   [facility], Acute Rehab PT     IP TREATMENT    2:00 PM   (45 min.)   Luz Chambers, OT   [facility], Acute Rehab OT 8     Admission    5:00 PM   Sheldon Soto MD   [address] [facility]   (Discharge: 8/12/2017)     XR CHEST 2 VIEWS    5:55 PM   (15 min.)   [email or web address]   [facility],  Radiology     CT SOFT TISSUE NECK W    8:40 PM   (30 min.)   [id number]   [facility], CT 9     10     IP EVALUATION    6:00 AM   (60 min.)   Ruben Amaya, PT   [facility], Physical Therapy 11     IP EVALUATION    6:00 AM   (60 min.)   Gisell Gottlieb, OT   [facility], Occupational Therapy     IP TREATMENT    6:00 AM   (30 min.)   Archana Kingsley, PT   [facility], Physical Therapy 12     IP TREATMENT    6:30 AM   (30 min.)   Archana Kingsley, PT   [facility], [place], Physical Therapy     Admission   11:28 AM   Regino Josue MD   [facility]   (Discharge: 8/19/2017)     IP EVALUATION    3:45 PM   (60 min.)   Robert Gavin,  PT   [facility], Acute Rehab PT   13     IP TREATMENT    9:15 AM   (60 min.)   Robert Gavin, PT   [facility], Acute Rehab PT     IP EVALUATION   11:15 AM   (45 min.)   Carlos Stephenson, OTR   [facility], Acute Rehab OT     IP EVALUATION    2:15 PM   (45 min.)   Carlos Stephenson, OTR   [facility], Acute Rehab OT     IP TREATMENT    3:15 PM   (45 min.)   Robert Gavin, PT   [facility], Acute Rehab PT 14     IP TREATMENT    7:00 AM   (60 min.)   Luz Chambers, OT   [facility], Acute Rehab OT     IP TREATMENT    8:15 AM   (60 min.)   Catrina Wyatt, PT   [facility], Acute Rehab PT     IP TREATMENT   11:00 AM   (45 min.)   Catrina Wyatt, PT   [facility], Acute Rehab PT     IP TREATMENT    3:30 PM   (30 min.)   Luz Chambers, OT   [facility], Acute Rehab OT 15     IP TREATMENT   10:00 AM   (60 min.)   Soraya Oro, PTA   [facility], Acute Rehab PT     IP TREATMENT   11:15 AM   (45 min.)   Luz Chambers, OT   [facility], Acute Rehab OT     IP TREATMENT    1:15 PM   (60 min.)   Becky Pinedo   [facility], Acute Rehab OT     IP TREATMENT    2:30 PM   (30 min.)   Soraya Oro, PTA   [facility], Acute Rehab PT 16     IP TREATMENT    9:30 AM   (60 min.)   Lali Boothe, PT   [facility], Acute Rehab PT     IP TREATMENT   11:00 AM   (60 min.)   Luz Chambers, OT   [facility], Acute Rehab OT     [facility] ONC RETURN   12:15 PM   (30 min.)   Lenore Rodriguez MD   [facility] Blood and Marrow Transplant     IP TREATMENT    2:00 PM   (30 min.)   Luz Chambers, OT   [facility], Acute Rehab OT     IP TREATMENT    2:30 PM   (30 min.)   Lali Boothe, PT   [facility], Acute Rehab PT     IP TREATMENT    2:30 PM   (30 min.)   Luz Chambers, OT   [facility], Acute Rehab OT     IP TREATMENT    3:00 PM   (45 min.)   Lali Boothe, PT   [facility], Acute Rehab PT 17     IP TREATMENT    9:15 AM   (60 min.)   Becky Pinedo   [facility], Acute Rehab  OT     IP TREATMENT   10:45 AM   (60 min.)   Catrina Wyatt, PT   [facility], Acute Rehab PT     IP TREATMENT    1:45 PM   (45 min.)   Becky Pinedo M   [facility], Acute Rehab OT     IP TREATMENT    2:30 PM   (30 min.)   Amrita Lomeli, PT   [facility], Acute Rehab PT 18     IP TREATMENT    9:15 AM   (60 min.)   Luz Chambers, OT   [facility], Acute Rehab OT     IP TREATMENT   11:00 AM   (60 min.)   Pao Lindsey, PTA   [facility], Acute Rehab PT     [facility] PICC & IV MED    1:00 PM   (90 min.)   Marisol Trevino, RN   [facility], [facility]     IP TREATMENT    2:30 PM   (45 min.)   Francisco Javier Terrazas, [facility], Acute Rehab PT     IP TREATMENT    3:30 PM   (30 min.)   Khushboo Ku, OT   [facility], Acute Rehab OT 19     IP TREATMENT   10:30 AM   (30 min.)   Becca Yepez, PT   [facility], Acute Rehab PT   20     21     [facility] LAB DRAW    4:45 PM   (15 min.)    [facility] LAB DRAW   [facility] Lab Draw     P LUMBAR PUNCTURE    5:25 PM   (50 min.)   Kalpana Alvarado PA-C [facility] 22     23     XR LP SPINAL TAP DIAGNOSTIC    8:15 AM   (30 min.)   [id number]   [facility] Xray 24     [facility] DRAW    7:00 AM   (15 min.)    [facility]   [facility]     P ONC INFUSION 360    7:30 AM   (360 min.)   [facility] ONCOL[facility]Y INFUSION   [facility]     LONG    7:45 AM   (60 min.)   Maria Esther Pedroza [facility] Medication Therapy Management 25     26       27     28     29 30 31 September 2017 Sunday Monday Tuesday Wednesday Thursday Friday Saturday                            1     2       3     4     5     6     [facility] DRAW    7:15 AM   (15 min.)    [facility] DRAW   [facility]     [facility] RETURN    7:45 AM   (50 min.)   Kalpana Alvarado PA-C [facility] 7     8     9       10     11     12      13     P [facility] LAB DRAW    8:00 AM   (15 min.)    [facility] LAB DRAW   [facility] Lab Draw     P ONC INFUSION 360    8:30 AM   (360 min.)    ONCOLOGY INFUSION   [facility] 14     15     [facility] NEW    9:45 AM   (60 min.)   Pj Cunha MD   [facility] Rheumatology 16       17     18     19     20     21     22     23       24     25     26     27     UMP IMPLANTED DEFIBRILLATOR   12:45 PM   (30 min.)   1,  Cv Device   [facility]    1:15 PM   (30 min.)   Juan Eaton MD   [facility] 28     29     30                 Recent Results (from the past 24 hour(s))   CBC with platelets differential    Collection Time: 08/24/17  7:21 AM   Result Value Ref Range    WBC 3.1 (L) 4.0 - 11.0 10e9/L    RBC Count 2.91 (L) 3.8 - 5.2 10e12/L    Hemoglobin 9.4 (L) 11.7 - 15.7 g/dL    Hematocrit 29.2 (L) 35.0 - 47.0 %     78 - 100 fl    MCH 32.3 26.5 - 33.0 pg    MCHC 32.2 31.5 - 36.5 g/dL    RDW 25.2 (H) 10.0 - 15.0 %    Platelet Count 130 (L) 150 - 450 10e9/L    Diff Method Manual Differential     % Neutrophils 52.3 %    % Lymphocytes 14.7 %    % Monocytes 16.5 %    % Eosinophils 6.4 %    % Basophils 7.3 %    % Metamyelocytes 2.8 %    Nucleated RBCs 2 (H) 0 /100    Absolute Neutrophil 1.6 1.6 - 8.3 10e9/L    Absolute Lymphocytes 0.5 (L) 0.8 - 5.3 10e9/L    Absolute Monocytes 0.5 0.0 - 1.3 10e9/L    Absolute Eosinophils 0.2 0.0 - 0.7 10e9/L    Absolute Basophils 0.2 0.0 - 0.2 10e9/L    Absolute Metamyelocytes 0.1 (H) 0 10e9/L    Absolute Nucleated RBC 0.1     Anisocytosis Moderate     Poikilocytosis Slight     Polychromasia Moderate     Spherocytes Slight     Acanthocytes Slight     Microcytes Present     Macrocytes Present    Comprehensive metabolic panel    Collection Time: 08/24/17  7:21 AM   Result Value Ref Range    Sodium 141 133 - 144 mmol/L    Potassium 3.9 3.4 - 5.3 mmol/L    Chloride 106 94 - 109 mmol/L    Carbon Dioxide 27 20 - 32 mmol/L     Anion Gap 7 3 - 14 mmol/L    Glucose 103 (H) 70 - 99 mg/dL    Urea Nitrogen 17 7 - 30 mg/dL    Creatinine 0.53 0.52 - 1.04 mg/dL    GFR Estimate >90 >60 mL/min/1.7m2    GFR Estimate If Black >90 >60 mL/min/1.7m2    Calcium 9.0 8.5 - 10.1 mg/dL    Bilirubin Total 0.6 0.2 - 1.3 mg/dL    Albumin 3.6 3.4 - 5.0 g/dL    Protein Total 6.8 6.8 - 8.8 g/dL    Alkaline Phosphatase 136 40 - 150 U/L    ALT 74 (H) 0 - 50 U/L    AST 46 (H) 0 - 45 U/L   Protein timed urine    Collection Time: 08/24/17  7:21 AM   Result Value Ref Range    Protein Random Urine 0.06 g/L    Protein Total 24 Hr Urine 0.10 0.04 - 0.23    Protein Total Urine g/gr Creatinine 0.24 (H) 0 - 0.2 g/g Cr   Creatinine timed urine    Collection Time: 08/24/17  7:21 AM   Result Value Ref Range    Creatinine Urine 24 mg/dL    Creatinine Urine Timed 0.42 (L) 0.80 - 1.80    Volume in mL 1790 mL    Duration in hours 24.0 h

## 2017-08-24 NOTE — MR AVS SNAPSHOT
After Visit Summary   8/24/2017    Amelia Michel    MRN: [id number]           Patient Information     Date Of Birth          1960        Visit Information        Provider Department      8/24/2017 7:30 AM  13 ATC;  ONCOLOGY INFUSION [facility]        Today's Diagnoses     Diffuse large B-cell lymphoma of extranodal site (H)    -  1      Care Instructions    We applied the Neulasta Onpro On-body injector today at about 12:35pm. The injection will start 27 hours after application, at 3:35pm tomorrow. The Neulasta Onpro On-body injection takes 45 minutes once it begins, and should have a green flashing light throughout. If it flashes red, call triage or the on-call number on your discharge paperwork. The injection will be done at about 4:30pm tomorrow. You can then remove the device and dispose of it. Keep electronics 4 inches away from Onpro injector. Avoid showering 4 hours prior to injection. Refer to the written instructions given to you if you have any questions or call triage/on-call number.    Contact Numbers    [facility] Main [place]: [phone]  [facility] Triage:  [phone]    Call triage with chills and/or temperature greater than or equal to 100.5, uncontrolled nausea/vomiting, diarrhea, constipation, dizziness, shortness of breath, chest pain, bleeding, unexplained bruising, or any new/concerning symptoms, questions/concerns.     If you are having any concerning symptoms or wish to speak to a provider before your next infusion visit, please call your care coordinator or triage to notify them so we can adequately serve you.       After Hours: [phone]    If after hours, weekends, or holidays, call main hospital  and ask for Oncology doctor on call.           August 2017 Sunday Monday Tuesday Wednesday Thursday Friday Saturday             1     IP TREATMENT    6:00 AM   (30 min.)   Selvin Dean, PT   [facility], [place], Physical Therapy 2     IP TREATMENT     6:00 AM   (30 min.)   Isha Tadeo, OT   [facility], [place], Occupational Therapy     IP TREATMENT    6:00 AM   (30 min.)   Selvin Dean, PT   [facility], Physical Therapy 3     IP TREATMENT    6:00 AM   (30 min.)   Selvin Dean, PT   [facility] [place], Physical Therapy     IP TREATMENT    6:00 AM   (30 min.)   Isha Tadeo, OT   [facility], [place], Occupational Therapy 4     Admission   11:36 AM   Mary Babcock MD   [facility]   (Discharge: 8/8/2017) 5     IP EVALUATION    7:00 AM   (60 min.)   Gala Arvizu, OT   [facility], [place], Acute Rehab OT     IP EVALUATION    9:00 AM   (45 min.)   Amrita Lomeli, PT   [facility], Acute Rehab PT     IP EVALUATION    2:15 PM   (45 min.)   Gala Arvizu, OT   [facility], Acute Rehab OT     IP EVALUATION    3:00 PM   (45 min.)   Amrita Lomeli, PT   [facility], Acute Rehab PT   6     IP TREATMENT    7:00 AM   (60 min.)   Gala Arvizu, OT   [facility], Acute Rehab OT     IP TREATMENT    9:00 AM   (45 min.)   Amrita Lomeli, PT   [facility], Acute Rehab PT     IP TREATMENT    3:00 PM   (45 min.)   Amrita Lomeli, PT   [facility], Acute Rehab PT 7     IP TREATMENT    8:15 AM   (60 min.)   Catrina Wyatt, PT   [facility], Acute Rehab PT     IP TREATMENT   10:00 AM   (60 min.)   Luz Chambers, OT   [facility], Acute Rehab OT     IP TREATMENT   11:00 AM   (30 min.)   Catrina Wyatt, PT   [facility], Acute Rehab PT     IP TREATMENT    2:00 PM   (45 min.)   Luz Chambers, OT   [facility], Acute Rehab OT 8     Admission    5:00 PM   Sheldon Soto MD   [facility] [facility]   (Discharge: 8/12/2017)     XR CHEST 2 VIEWS    5:55 PM   (15 min.)   UUXR1   [facility],  Radiology     CT SOFT TISSUE NECK W    8:40 PM   (30 min.)   UUCT1   [facility], [place], CT 9     10     IP EVALUATION    6:00 AM   (60 min.)   Ruben Amaya, PT   [facility], [place], Physical Therapy 11     IP EVALUATION    6:00 AM   (60 min.)    Gisell Gottlieb, OT   [facility], Occupational Therapy     IP TREATMENT    6:00 AM   (30 min.)   Archana Kingsley, PT   [facility], Physical Therapy 12     IP TREATMENT    6:30 AM   (30 min.)   Archana Kingsley, PT   [facility], Physical Therapy     Admission   11:28 AM   Regino Josue MD   [facility]   (Discharge: 8/19/2017)     IP EVALUATION    3:45 PM   (60 min.)   Robert Gavin, PT   [facility], Acute Rehab PT   13     IP TREATMENT    9:15 AM   (60 min.)   Robert Gavin, PT   [facility], Acute Rehab PT     IP EVALUATION   11:15 AM   (45 min.)   Carlos Stephenson, OTR   [facility], Acute Rehab OT     IP EVALUATION    2:15 PM   (45 min.)   Carlos Stephenson, OTR   [facility], Acute Rehab OT     IP TREATMENT    3:15 PM   (45 min.)   Robert Gavin, PT   [facility], Acute Rehab PT 14     IP TREATMENT    7:00 AM   (60 min.)   Luz Chambers, OT   [facility], Acute Rehab OT     IP TREATMENT    8:15 AM   (60 min.)   Catrina Wyatt, PT   [facility], Acute Rehab PT     IP TREATMENT   11:00 AM   (45 min.)   Catrina Wyatt, PT   [facility], Acute Rehab PT     IP TREATMENT    3:30 PM   (30 min.)   Luz Chambers, OT   [facility], Acute Rehab OT 15     IP TREATMENT   10:00 AM   (60 min.)   Soraya Oro, [facility], Acute Rehab PT     IP TREATMENT   11:15 AM   (45 min.)   Luz Chambers, OT   [facility], Acute Rehab OT     IP TREATMENT    1:15 PM   (60 min.)   Becky Pinedo   [facility], Acute Rehab OT     IP TREATMENT    2:30 PM   (30 min.)   Soraya Oro, PTA   [facility], Acute Rehab PT 16     IP TREATMENT    9:30 AM   (60 min.)   Lali Boothe, PT   [facility], Acute Rehab PT     IP TREATMENT   11:00 AM   (60 min.)   Luz Chambers, OT   [facility] [place], Acute Rehab OT     [facility] ONC RETURN   12:15 PM   (30 min.)   Lenore Rodriguez MD   [facility]     IP TREATMENT    2:00 PM   (30 min.)   Luz Chambers,  OT   [facility], Acute Rehab OT     IP TREATMENT    2:30 PM   (30 min.)   Lali Boothe, PT   [facility], Acute Rehab PT     IP TREATMENT    2:30 PM   (30 min.)   Luz Chambers, OT   [facility], Acute Rehab OT     IP TREATMENT    3:00 PM   (45 min.)   Lali Boothe, PT   [facility], Acute Rehab PT 17     IP TREATMENT    9:15 AM   (60 min.)   Becky Pinedo [facility], Acute Rehab OT     IP TREATMENT   10:45 AM   (60 min.)   Catrina Wyatt, PT   [facility], Acute Rehab PT     IP TREATMENT    1:45 PM   (45 min.)   Becky Pinedo [facility], Acute Rehab OT     IP TREATMENT    2:30 PM   (30 min.)   Amrita Lomeli, PT   [facility], Acute Rehab PT 18     IP TREATMENT    9:15 AM   (60 min.)   Luz Chambers, OT   [facility], Acute Rehab OT     IP TREATMENT   11:00 AM   (60 min.)   Pao Lindsey, [facility], Acute Rehab PT     [facility] PICC & IV MED    1:00 PM   (90 min.)   Marisol Trevino, RN   [facility], [facility]     IP TREATMENT    2:30 PM   (45 min.)   Francisco Javier Terrazas, [facility], Acute Rehab PT     IP TREATMENT    3:30 PM   (30 min.)   Khushboo Ku, OT   [facility], Acute Rehab OT 19     IP TREATMENT   10:30 AM   (30 min.)   Becca Yepez, PT   [facility], Acute Rehab PT   20     21     [facility] MASONIC LAB DRAW    4:45 PM   (15 min.)    [employer] [facility] DRAW   [facility] [employer] Lab Draw     P LUMBAR PUNCTURE    5:25 PM   (50 min.)   Kalpana Alvarado PA-C   [facility] 22     23     XR LP SPINAL TAP DIAGNOSTIC    8:15 AM   (30 min.)   UCXR3   [facility] Xray 24     P MASONIC LAB DRAW    7:00 AM   (15 min.)    [facility] LAB DRAW   [facility]     [facility] ONC INFUSION 360    7:30 AM   (360 min.)    ONCOLOGY INFUSION   [facility]     LONG    7:45 AM   (60 min.)   Maria Esther Pedroza, [facility] Medication Therapy Management 25     26       27     28     29      30 31 September 2017 Sunday Monday Tuesday Wednesday Thursday Friday Saturday                            1     2       3     4     5     6     [facility] MASONIC LAB DRAW    7:15 AM   (15 min.)    MASONIC LAB DRAW   [facility] Lab Draw     UMP RETURN    7:45 AM   (50 min.)   Kalpana Alvarado PA-C   [facility] 7     8     9       10     11     12     13     [facility] MASONIC LAB DRAW    8:00 AM   (15 min.)    MASONIC LAB DRAW   [facility] Lab Draw     [facility] ONC INFUSION 360    8:30 AM   (360 min.)    ONCOLOGY INFUSION   [facility] 14     15     UMP NEW    9:45 AM   (60 min.)   Pj Cunha MD   [facility] Rheumatology 16       17     18     19     20     21     22     23       24     25     26     27     UMP IMPLANTED DEFIBRILLATOR   12:45 PM   (30 min.)   1,  Cv Device   [facility]     UMP RETURN ARRHYTHMIA    1:15 PM   (30 min.)   Juan Eaton MD   [facility] 28     29     30                 Recent Results (from the past 24 hour(s))   CBC with platelets differential    Collection Time: 08/24/17  7:21 AM   Result Value Ref Range    WBC 3.1 (L) 4.0 - 11.0 10e9/L    RBC Count 2.91 (L) 3.8 - 5.2 10e12/L    Hemoglobin 9.4 (L) 11.7 - 15.7 g/dL    Hematocrit 29.2 (L) 35.0 - 47.0 %     78 - 100 fl    MCH 32.3 26.5 - 33.0 pg    MCHC 32.2 31.5 - 36.5 g/dL    RDW 25.2 (H) 10.0 - 15.0 %    Platelet Count 130 (L) 150 - 450 10e9/L    Diff Method Manual Differential     % Neutrophils 52.3 %    % Lymphocytes 14.7 %    % Monocytes 16.5 %    % Eosinophils 6.4 %    % Basophils 7.3 %    % Metamyelocytes 2.8 %    Nucleated RBCs 2 (H) 0 /100    Absolute Neutrophil 1.6 1.6 - 8.3 10e9/L    Absolute Lymphocytes 0.5 (L) 0.8 - 5.3 10e9/L    Absolute Monocytes 0.5 0.0 - 1.3 10e9/L    Absolute Eosinophils 0.2 0.0 - 0.7 10e9/L    Absolute Basophils 0.2 0.0 - 0.2 10e9/L    Absolute Metamyelocytes 0.1 (H) 0 10e9/L    Absolute Nucleated RBC  0.1     Anisocytosis Moderate     Poikilocytosis Slight     Polychromasia Moderate     Spherocytes Slight     Acanthocytes Slight     Microcytes Present     Macrocytes Present    Comprehensive metabolic panel    Collection Time: 08/24/17  7:21 AM   Result Value Ref Range    Sodium 141 133 - 144 mmol/L    Potassium 3.9 3.4 - 5.3 mmol/L    Chloride 106 94 - 109 mmol/L    Carbon Dioxide 27 20 - 32 mmol/L    Anion Gap 7 3 - 14 mmol/L    Glucose 103 (H) 70 - 99 mg/dL    Urea Nitrogen 17 7 - 30 mg/dL    Creatinine 0.53 0.52 - 1.04 mg/dL    GFR Estimate >90 >60 mL/min/1.7m2    GFR Estimate If Black >90 >60 mL/min/1.7m2    Calcium 9.0 8.5 - 10.1 mg/dL    Bilirubin Total 0.6 0.2 - 1.3 mg/dL    Albumin 3.6 3.4 - 5.0 g/dL    Protein Total 6.8 6.8 - 8.8 g/dL    Alkaline Phosphatase 136 40 - 150 U/L    ALT 74 (H) 0 - 50 U/L    AST 46 (H) 0 - 45 U/L   Protein timed urine    Collection Time: 08/24/17  7:21 AM   Result Value Ref Range    Protein Random Urine 0.06 g/L    Protein Total 24 Hr Urine 0.10 0.04 - 0.23    Protein Total Urine g/gr Creatinine 0.24 (H) 0 - 0.2 g/g Cr   Creatinine timed urine    Collection Time: 08/24/17  7:21 AM   Result Value Ref Range    Creatinine Urine 24 mg/dL    Creatinine Urine Timed 0.42 (L) 0.80 - 1.80    Volume in mL 1790 mL    Duration in hours 24.0 h                 Follow-ups after your visit        Your next 10 appointments already scheduled     Sep 06, 2017  7:15 AM CDT   M[facility] Lab Draw with  MASONIC LAB DRAW   [facility] Lab Draw ([facility])    [address] [address] [phone]            Sep 06, 2017  8:00 AM CDT   (Arrive by 7:45 AM)   Return Visit with Kalpana Alvarado PA-C   [facility] ([facility])    [address] [address] [phone]            Sep 13, 2017  8:00 AM FLORIAN   Masonic Lab Draw with  MAY LAB DRAW   [facility] Lab Draw ([facility])    [address] [facility]  2nd [facility] [address]   [phone]            Sep 13, 2017  8:30 AM CDT   Infusion 360 with UC ONCOLOGY INFUSION, UC 20 ATC   [facility] ([facility])    [address] [place] [address]   [phone]            Sep 15, 2017 10:00 AM CDT   (Arrive by 9:45 AM)   New Patient Visit with Pj Cunha MD   [facility] ([facility])    [address] [place] [address]  3rd [facility] [address]   [phone]            Sep 27, 2017  1:00 PM CDT   (Arrive by 12:45 PM)   Implanted Defibulator with Uc Cv Device 1   [facility] ([facility])    [address] [place] [address]   [phone]            Sep 27, 2017  1:30 PM CDT   (Arrive by 1:15 PM)   RETURN ARRHYTHMIA with Juan Eaton MD   [facility] ([facility])    [address] [place] [id number] [phone]              Who to contact     If you have questions or need follow up information about today's clinic visit or your schedule please contact [facility] directly at [phone].  Normal or non-critical lab and imaging results will be communicated to you by MyChart, letter or phone within 4 business days after the clinic has received the results. If you do not hear from us within 7 days, please contact the clinic through [employer] or phone. If you have a critical or abnormal lab result, we will notify you by phone as soon as possible.  Submit refill requests through [employer] or call your pharmacy and they will forward the refill request to us. Please allow 3 business days for your refill to be completed.          Additional Information About Your Visit        MyChart Information     [employer] gives you secure access to your  electronic health record. If you see a primary care provider, you can also send messages to your care team and make appointments. If you have questions, please call your primary care clinic.  If you do not have a primary care provider, please call [phone] and they will assist you.        Care EveryWhere ID     This is your Care EveryWhere ID. This could be used by other organizations to access your [place] medical records  [id number]        Your Vitals Were     Pulse Temperature Respirations Pulse Oximetry BMI (Body Mass Index)       71 97  F (36.1  C) 16 99% 25.64 kg/m2        Blood Pressure from Last 3 Encounters:   08/24/17 143/72   08/23/17 130/67   08/21/17 145/65    Weight from Last 3 Encounters:   08/24/17 55.7 kg (122 lb 11.2 oz)   08/21/17 54.9 kg (121 lb)   08/18/17 54.5 kg (120 lb 3.2 oz)              We Performed the Following     CBC with platelets differential     Comprehensive metabolic panel     Creatinine timed urine     Protein timed urine          Today's Medication Changes          These changes are accurate as of: 8/24/17 12:40 PM.  If you have any questions, ask your nurse or doctor.               These medicines have changed or have updated prescriptions.        Dose/Directions    * predniSONE 5 MG tablet   Commonly known as:  DELTASONE   This may have changed:  Another medication with the same name was added. Make sure you understand how and when to take each.   Used for:  Diffuse large B-cell lymphoma, unspecified body region (H)        Dose:  5 mg   Take 1 tablet (5 mg) by mouth daily   Quantity:  30 tablet   Refills:  0       * predniSONE 50 MG tablet   Commonly known as:  DELTASONE   This may have changed:  You were already taking a medication with the same name, and this prescription was added. Make sure you understand how and when to take each.   Used for:  Diffuse large B-cell lymphoma of extranodal site (H)        Dose:  50 mg   Take 1 tablet (50 mg) by mouth 2 times daily for  5 days Take on Days 1 through 5. Take first dose in AM prior to chemotherapy.   Quantity:  10 tablet   Refills:  0       * Notice:  This list has 2 medication(s) that are the same as other medications prescribed for you. Read the directions carefully, and ask your doctor or other care provider to review them with you.      Stop taking these medicines if you haven't already. Please contact your care team if you have questions.     traMADol 50 MG tablet   Commonly known as:  ULTRAM   Stopped by:  Maria Esther Pedroza RPH           triamcinolone 0.5 % Oint ointment   Commonly known as:  KENALOG   Stopped by:  Maria Esther Pedroza RPH                Where to get your medicines      These medications were sent to [facility] [address] [facility] [address]  [address] [place] [address] [address] [place] [address]    Hours:  TRANSPLANT PHONE NUMBER [phone] Phone:  [phone]     predniSONE 50 MG tablet                Primary Care Provider Office Phone # Fax #    Comfort Sabillon [phone] [address] [facility] [facility] [address]        Equal Access to Services     SARAH [facility] HospitalBHAVESH AH: Hadii laurita mcguire hadasho Sogilbert, waaxda luqadaha, qaybta kaalmada adejayaalana, durga hermosillo . So [facility] [phone].    ATENCIÓN: Si habla español, tiene a sarmiento disposición servicios gratuitos de asistencia lingüística. Llame al [phone].    We comply with applicable federal civil rights laws and [place] laws. We do not discriminate on the basis of race, color, national origin, age, disability sex, sexual orientation or gender identity.            Thank you!     Thank you for choosing [facility]  for your care. Our goal is always to provide you with excellent care. Hearing back from our patients is one way we can continue to improve our services. Please take a few minutes to complete the written survey that you may receive in the mail after your visit  with us. Thank you!             Your Updated Medication List - Protect others around you: Learn how to safely use, store and throw away your medicines at www.disposemymeds.org.          This list is accurate as of: 8/24/17 12:40 PM.  Always use your most recent med list.                   Brand Name Dispense Instructions for use Diagnosis    acetaminophen 325 MG tablet    TYLENOL    100 tablet    Take 2 tablets (650 mg) by mouth every 4 hours as needed for mild pain    Rheumatoid arthritis involving multiple sites with positive rheumatoid factor (H)       acyclovir 400 MG tablet    ZOVIRAX    60 tablet    Take 1 tablet (400 mg) by mouth 2 times daily    Diffuse large B-cell lymphoma, unspecified body region (H)       allopurinol 300 MG tablet    ZYLOPRIM    30 tablet    Take 1 tablet (300 mg) by mouth daily    Diffuse large B-cell lymphoma, unspecified body region (H)       ascorbic acid 500 MG Tabs     30 tablet    Take 1 tablet (500 mg) by mouth daily    Diffuse large B-cell lymphoma, unspecified body region (H)       atenolol 25 MG tablet    TENORMIN    60 tablet    Take 1 tablet (25 mg) by mouth 2 times daily    Atrial tachycardia (H)       calcium-vitamin D 600-400 MG-UNIT per tablet    CALTRATE    60 tablet    Take 2 tablets by mouth every morning    Diffuse large B-cell lymphoma, unspecified body region (H)       digoxin 125 MCG tablet    LANOXIN    30 tablet    Take 1 tablet (125 mcg) by mouth daily    Atrial tachycardia (H)       fluconazole 200 MG tablet    DIFLUCAN    30 tablet    Take 1 tablet (200 mg) by mouth daily    Diffuse large B-cell lymphoma, unspecified body region (H)       folic acid 1 MG tablet    FOLVITE    30 tablet    Take 1 tablet (1 mg) by mouth daily    Diffuse large B-cell lymphoma, unspecified body region (H)       gabapentin 100 MG capsule    NEURONTIN    120 capsule    Take 2 capsules (200 mg) by mouth 3 times daily    Critical illness myopathy       lactobacillus rhamnosus (GG)  capsule     60 capsule    Take 1 capsule by mouth 2 times daily    Physical deconditioning       levofloxacin 250 MG tablet    LEVAQUIN    30 tablet    Take 1 tablet (250 mg) by mouth daily    Diffuse large B-cell lymphoma, unspecified body region (H)       multivitamin, therapeutic with minerals Tabs tablet     30 each    Take 1 tablet by mouth daily    Diffuse large B-cell lymphoma, unspecified body region (H)       potassium chloride SA 20 MEQ CR tablet    K-DUR/KLOR-CON M    90 tablet    Take 1 tablet (20 mEq) by mouth daily    Diffuse large B-cell lymphoma, unspecified body region (H)       * predniSONE 5 MG tablet    DELTASONE    30 tablet    Take 1 tablet (5 mg) by mouth daily    Diffuse large B-cell lymphoma, unspecified body region (H)       * predniSONE 50 MG tablet    DELTASONE    10 tablet    Take 1 tablet (50 mg) by mouth 2 times daily for 5 days Take on Days 1 through 5. Take first dose in AM prior to chemotherapy.    Diffuse large B-cell lymphoma of extranodal site (H)       thiamine 50 MG Tabs     30 tablet    Take 1 tablet (50 mg) by mouth daily    Diffuse large B-cell lymphoma, unspecified body region (H)       * Notice:  This list has 2 medication(s) that are the same as other medications prescribed for you. Read the directions carefully, and ask your doctor or other care provider to review them with you.

## 2017-08-24 NOTE — MR AVS SNAPSHOT
After Visit Summary   8/24/2017    Amelia Michel    MRN: [id number]           Patient Information     Date Of Birth          1960        Visit Information        Provider Department      8/24/2017 8:00 AM [facility]        Today's Diagnoses     Diffuse large B-cell lymphoma, unspecified body region (H)    -  1    Immunocompromised state (H)        Rheumatoid arthritis, involving unspecified site, unspecified rheumatoid factor presence (H)        Neuropathy (H)        Nutritional deficiency        Cardiac arrhythmia, unspecified cardiac arrhythmia type          Care Instructions    Recommendations from today's MTM visit:                                                    MTM (medication therapy management) is a service provided by a clinical pharmacist designed to help you get the most of out of your medicines.   Today we reviewed what your medicines are for, how to know if they are working, that your medicines are safe and how to make your medicine regimen as easy as possible.     1. I will ask Dr Rodriguez if you should stop the folic acid before your high-dose methotrexate chemotherapy.    Next MTM visit: 1-2 months    To schedule another MTM appointment, please call the clinic directly or you may call the MTM scheduling line at [phone] or toll-free at [phone].     My Clinical Pharmacist's contact information:                                                      It was a pleasure seeing you today!  Please feel free to contact me with any questions or concerns you have.      Maria Esther Pedroza, PharmD, BCOP, BCPS  Clinical Pharmacy Specialist/  Oncology Medication Therapy Management Pharmacist  [facility]  Pager [phone]  Phone [phone]          You may receive a survey about the MTM services you received.  I would appreciate your feedback to help me serve you better in the future. Please fill it out and return it when you  can. Your comments will be anonymous.              Follow-ups after your visit        Your next 10 appointments already scheduled     Sep 06, 2017  7:15 AM CDT   [facility] with [facility]   [facility])    [address] [place] [id number]   [phone]            Sep 06, 2017  8:00 AM CDT   (Arrive by 7:45 AM)   Return Visit with Kalpana Alvarado PA-C   [facility] ([facility])    [address] [place] [address]   [phone]            Sep 13, 2017  8:00 AM CDT   [facility] Lab Draw with [facility] DRAW   [facility] Draw ([facility])    [address] [place] [address]   [phone]            Sep 13, 2017  8:30 AM CDT   Infusion 360 with UC ONCOLOGY INFUSION, UC 20 ATC   [facility] ([facility])    [address] [place] [address]   [phone]            Sep 15, 2017 10:00 AM CDT   (Arrive by 9:45 AM)   New Patient Visit with Pj Cunha MD   [facility] ([facility])    [address] [place] [id number] [phone]            Sep 27, 2017  1:00 PM CDT   (Arrive by 12:45 PM)   Implanted Defibulator with Uc Cv Device 1   [facility] ([facility])    [address] [place] [id number] [phone]            Sep 27, 2017  1:30 PM CDT   (Arrive by 1:15 PM)   RETURN ARRHYTHMIA with Juan Eaton MD   [facility] ([facility])    [address] [place] [id number] [phone]              Who to contact     If you have questions or need follow up information about today's clinic visit or your schedule please contact [facility] MEDICATION THERAPY  MANAGEMENT directly at [phone].  Normal or non-critical lab and imaging results will be communicated to you by Apportable[employer], letter or phone within 4 business days after the clinic has received the results. If you do not hear from us within 7 days, please contact the clinic through Apportablehart or phone. If you have a critical or abnormal lab result, we will notify you by phone as soon as possible.  Submit refill requests through Connect or call your pharmacy and they will forward the refill request to us. Please allow 3 business days for your refill to be completed.          Additional Information About Your Visit        Apportablehart Information     Connect gives you secure access to your electronic health record. If you see a primary care provider, you can also send messages to your care team and make appointments. If you have questions, please call your primary care clinic.  If you do not have a primary care provider, please call [phone] and they will assist you.        Care EveryWhere ID     This is your Care EveryWhere ID. This could be used by other organizations to access your [facility] medical records  [id number]         Blood Pressure from Last 3 Encounters:   08/24/17 133/64   08/23/17 130/67   08/21/17 145/65    Weight from Last 3 Encounters:   08/24/17 122 lb 11.2 oz (55.7 kg)   08/21/17 121 lb (54.9 kg)   08/18/17 120 lb 3.2 oz (54.5 kg)              Today, you had the following     No orders found for display         Today's Medication Changes          These changes are accurate as of: 8/24/17 12:50 PM.  If you have any questions, ask your nurse or doctor.               These medicines have changed or have updated prescriptions.        Dose/Directions    * predniSONE 5 MG tablet   Commonly known as:  DELTASONE   This may have changed:  Another medication with the same name was added. Make sure you understand how and when to take each.   Used for:  Diffuse large B-cell lymphoma, unspecified body region (H)         Dose:  5 mg   Take 1 tablet (5 mg) by mouth daily   Quantity:  30 tablet   Refills:  0       * predniSONE 50 MG tablet   Commonly known as:  DELTASONE   This may have changed:  You were already taking a medication with the same name, and this prescription was added. Make sure you understand how and when to take each.   Used for:  Diffuse large B-cell lymphoma of extranodal site (H)        Dose:  50 mg   Take 1 tablet (50 mg) by mouth 2 times daily for 5 days Take on Days 1 through 5. Take first dose in AM prior to chemotherapy.   Quantity:  10 tablet   Refills:  0       * Notice:  This list has 2 medication(s) that are the same as other medications prescribed for you. Read the directions carefully, and ask your doctor or other care provider to review them with you.      Stop taking these medicines if you haven't already. Please contact your care team if you have questions.     traMADol 50 MG tablet   Commonly known as:  ULTRAM   Stopped by:  Maria Esther Pedroza RPH           triamcinolone 0.5 % Oint ointment   Commonly known as:  KENALOG   Stopped by:  Maria Esthre Pedroza RPH                Where to get your medicines      These medications were sent to [place] [id number] [address] [place] [address] [address] [address]    Hours:  TRANSPLANT PHONE NUMBER [phone] Phone:  [phone]     predniSONE 50 MG tablet                Primary Care Provider Office Phone # Fax #    Comfort Sabillon [phone] [address] [address] include 234 beds at the [facility] [address]        Equal Access to Services     [facility] SONIDO AH: Hadii laurita ku hadasho Soomaali, waaxda luqadaha, qaybta kaalmada adeegyada, durga inman. So [facility] [phone].    ATENCIÓN: Si habla español, tiene a sarmiento disposición servicios gratuitos de asistencia lingüística. Llame al [phone].    We comply with applicable federal civil rights laws and [place] laws. We do  not discriminate on the basis of race, color, national origin, age, disability sex, sexual orientation or gender identity.            Thank you!     Thank you for choosing [facility] MEDICATION THERAPY MANAGEMENT  for your care. Our goal is always to provide you with excellent care. Hearing back from our patients is one way we can continue to improve our services. Please take a few minutes to complete the written survey that you may receive in the mail after your visit with us. Thank you!             Your Updated Medication List - Protect others around you: Learn how to safely use, store and throw away your medicines at www.disposemymeds.org.          This list is accurate as of: 8/24/17 12:50 PM.  Always use your most recent med list.                   Brand Name Dispense Instructions for use Diagnosis    acetaminophen 325 MG tablet    TYLENOL    100 tablet    Take 2 tablets (650 mg) by mouth every 4 hours as needed for mild pain    Rheumatoid arthritis involving multiple sites with positive rheumatoid factor (H)       acyclovir 400 MG tablet    ZOVIRAX    60 tablet    Take 1 tablet (400 mg) by mouth 2 times daily    Diffuse large B-cell lymphoma, unspecified body region (H)       allopurinol 300 MG tablet    ZYLOPRIM    30 tablet    Take 1 tablet (300 mg) by mouth daily    Diffuse large B-cell lymphoma, unspecified body region (H)       ascorbic acid 500 MG Tabs     30 tablet    Take 1 tablet (500 mg) by mouth daily    Diffuse large B-cell lymphoma, unspecified body region (H)       atenolol 25 MG tablet    TENORMIN    60 tablet    Take 1 tablet (25 mg) by mouth 2 times daily    Atrial tachycardia (H)       calcium-vitamin D 600-400 MG-UNIT per tablet    CALTRATE    60 tablet    Take 2 tablets by mouth every morning    Diffuse large B-cell lymphoma, unspecified body region (H)       digoxin 125 MCG tablet    LANOXIN    30 tablet    Take 1 tablet (125 mcg) by mouth daily    Atrial tachycardia (H)       fluconazole  200 MG tablet    DIFLUCAN    30 tablet    Take 1 tablet (200 mg) by mouth daily    Diffuse large B-cell lymphoma, unspecified body region (H)       folic acid 1 MG tablet    FOLVITE    30 tablet    Take 1 tablet (1 mg) by mouth daily    Diffuse large B-cell lymphoma, unspecified body region (H)       gabapentin 100 MG capsule    NEURONTIN    120 capsule    Take 2 capsules (200 mg) by mouth 3 times daily    Critical illness myopathy       lactobacillus rhamnosus (GG) capsule     60 capsule    Take 1 capsule by mouth 2 times daily    Physical deconditioning       levofloxacin 250 MG tablet    LEVAQUIN    30 tablet    Take 1 tablet (250 mg) by mouth daily    Diffuse large B-cell lymphoma, unspecified body region (H)       multivitamin, therapeutic with minerals Tabs tablet     30 each    Take 1 tablet by mouth daily    Diffuse large B-cell lymphoma, unspecified body region (H)       potassium chloride SA 20 MEQ CR tablet    K-DUR/KLOR-CON M    90 tablet    Take 1 tablet (20 mEq) by mouth daily    Diffuse large B-cell lymphoma, unspecified body region (H)       * predniSONE 5 MG tablet    DELTASONE    30 tablet    Take 1 tablet (5 mg) by mouth daily    Diffuse large B-cell lymphoma, unspecified body region (H)       * predniSONE 50 MG tablet    DELTASONE    10 tablet    Take 1 tablet (50 mg) by mouth 2 times daily for 5 days Take on Days 1 through 5. Take first dose in AM prior to chemotherapy.    Diffuse large B-cell lymphoma of extranodal site (H)       thiamine 50 MG Tabs     30 tablet    Take 1 tablet (50 mg) by mouth daily    Diffuse large B-cell lymphoma, unspecified body region (H)       * Notice:  This list has 2 medication(s) that are the same as other medications prescribed for you. Read the directions carefully, and ask your doctor or other care provider to review them with you.

## 2017-08-24 NOTE — NURSING NOTE
Chief Complaint   Patient presents with     Blood Draw     labs collected from R arm PICC line,vitals taken, caps changed     Valeria Yoon RN

## 2017-08-24 NOTE — PROGRESS NOTES
SUBJECTIVE/OBJECTIVE:                Amelia Michel is a 56 year old female coming in for a transitions of care visit.  She was discharged from [facility] on 8/19/17 for 8/19/17.     Chief Complaint: medication review.    The primary oncologist for this patient is Dr Lenore Rodriguez.  The PCP for this patient is Dr Comfort Sabillon.    Cancer diagnosis: Lymphoma (DLCBL)    Allergies/ADRs: Reviewed in Epic  Tobacco: No tobacco use   Alcohol: rare  Caffeine: no caffeine  Activity: PT    PMH: Reviewed in Epic    Medication Adherence: no issues reported and takes meds 3 times per day. Patient states she does forget to take her medications.    Lymphoma:  Current medications include: R-CHOP/HD-MTX (rituximab, cyclophosphamide, vincristine, prednisone, high-dose methotrexate).  Patient has had neutropenia, bacteremia and some mucositis after last cycle of chemotherapy.  Of note, patient has not yet had the high-dose methotrexate yet.  Patient is also taking allopurinol 300mg daily for tumor lysis syndrome prophylaxis.    Infection prophylaxis:  Current medications include: fluconazole 200mg daily, acyclovir 400mg BID and levofloxacin 250mg daily.  No rash/diarrhea/nausea reported.    RA:  Current medications include: prednisone 5mg daily.  She is also still taking folic acid 1mg daily, as she was on PO methotrexate, which has now been discontinued.  She denies side effects from the prednisone.    Atrial tachyarrhythmia/HTN:  Current medications include: atenolol 25mg BID, digoxin 125mcg daily, and potassium 20mEq daily.  Patient states that her cardiologist wanted to keep her potassium level > 4.0.  She does not check her BP at home, as she is here in clinic/hospital so frequently.  No side effects reported.    Neuropathy:  Current medications include: gabapentin 200mg (8o802oj) PO TID.  Patient reports fairly good pain control with this, and denies excessive sleepiness.    Vitamins/supplements:  Current medications include:  "probiotics, calcium/vitamin D, multivits, vitamin C, thiamine.  No problems reported.    Of note, patient is not currently needing the following medications (and were removed from medication list: tramadol, triamcinolone ointment.      Current labs include:  BP Readings from Last 3 Encounters:   08/24/17 133/64   08/23/17 130/67   08/21/17 145/65       Latest Ht and Wt:  Wt Readings from Last 2 Encounters:   08/24/17 122 lb 11.2 oz (55.7 kg)   08/21/17 121 lb (54.9 kg)     Ht Readings from Last 2 Encounters:   08/12/17 4' 10\" (1.473 m)   08/08/17 4' 9.68\" (1.465 m)        Latest vitals:  /64 P 67  Current labs include:  Lab Results   Component Value Date    BUN 17 08/24/2017     Lab Results   Component Value Date    CR 0.53 08/24/2017     Lab Results   Component Value Date    POTASSIUM 3.9 08/24/2017     Lab Results   Component Value Date    ALT 74 08/24/2017     Lab Results   Component Value Date    AST 46 08/24/2017     Lab Results   Component Value Date    BILITOTAL 0.6 08/24/2017     Lab Results   Component Value Date    ALBUMIN 3.6 08/24/2017     Lab Results   Component Value Date    WBC 3.1 08/24/2017     Lab Results   Component Value Date    HGB 9.4 08/24/2017     Lab Results   Component Value Date     08/24/2017     Absolute Neutrophil   Date Value Ref Range Status   08/24/2017 1.6 1.6 - 8.3 10e9/L Final       Most Recent Immunizations   Administered Date(s) Administered     Pneumococcal 23 valent 08/31/2011     TDAP Vaccine (Adacel) 02/08/2011       ASSESSMENT:                 Current medications were reviewed today.        Medication Adherence: no issues identified    Lymphoma: Stable. No recommendations at this time.    Infection prophylaxis: Stable. Antifungal, antivital and antibacterial prophylaxis appropriate for myelosuppressive regimen.    RA: Needs Improvement. Patient would benefit from stopping the folic acid before she is admitted for high-dose methotrexate.  Although the folic acid " level is low, it potentially could negate part of the efficacy of the methotrexate.  Will discuss with Dr Rodriguez.    Atrial tachyarrhythmia/HTN: Stable. Potassium level in the desired range.  BP meeting goal of < 140/90.  No changes needed.    Neuropathy: Stable. No change needed at this time.  Discussed with patient that the gabapentin dose could be increased if her pain control became inadequate.    Vitamins/supplements: Stable. No change needed.      PLAN:                Post Discharge Medication Reconciliation Status: discharge medications reconciled and changed, per note/orders (see AVS).    1.  Stop folic acid before starting high-dose methotrexate.  Will discuss with Dr Rodriguez.    I spent 30 minutes with this patient today.  A copy of the visit note was provided to the patient's primary care provider.    Will follow up in 1-2 months.    The patient was sent via [employer] a summary of these recommendations as an after visit summary.    Maria Esther Pedroza, PharmD, BCOP, BCPS  Clinical Pharmacy Specialist/  Oncology Medication Therapy Management Pharmacist  [facility]  Pager [phone]  Phone [phone]

## 2017-08-24 NOTE — PATIENT INSTRUCTIONS
Recommendations from today's MTM visit:                                                    MTM (medication therapy management) is a service provided by a clinical pharmacist designed to help you get the most of out of your medicines.   Today we reviewed what your medicines are for, how to know if they are working, that your medicines are safe and how to make your medicine regimen as easy as possible.     1. I will ask Dr Rodriguez if you should stop the folic acid before your high-dose methotrexate chemotherapy.    Next MTM visit: 1-2 months    To schedule another MTM appointment, please call the clinic directly or you may call the MTM scheduling line at [phone] or toll-free at [phone].     My Clinical Pharmacist's contact information:                                                      It was a pleasure seeing you today!  Please feel free to contact me with any questions or concerns you have.      Maria Esther Pedroza, PharmD, BCOP, BCPS  Clinical Pharmacy Specialist/  Oncology Medication Therapy Management Pharmacist  [facility]  Pager [phone]  Phone [phone]          You may receive a survey about the MTM services you received.  I would appreciate your feedback to help me serve you better in the future. Please fill it out and return it when you can. Your comments will be anonymous.

## 2017-08-25 ENCOUNTER — PRE VISIT (OUTPATIENT)
Dept: UROLOGY | Facility: CLINIC | Age: 57
End: 2017-08-25

## 2017-08-27 DIAGNOSIS — Z86.74 HISTORY OF CARDIAC ARREST: Primary | ICD-10-CM

## 2017-08-27 DIAGNOSIS — C83.398 DIFFUSE LARGE B-CELL LYMPHOMA OF EXTRANODAL SITE: ICD-10-CM

## 2017-08-28 ENCOUNTER — CARE COORDINATION (OUTPATIENT)
Dept: ONCOLOGY | Facility: CLINIC | Age: 57
End: 2017-08-28

## 2017-08-28 NOTE — PROGRESS NOTES
Called Mrs. Michel this AM to review her schedule and update her on some additional things that have been added. Informed her that Dr. Rodriguez would like her to come into the clinic this week for labs and a cardiac echo. Patient verbalized understanding and stated that early Thursday would be best for her.     Also talked with her about her PICC line. Per [facility] she had completed her IV antibiotic and her insurance didn't cover her basic line care anymore. Mrs. Michel confirmed this and stated that it was about $40 per flush. After talking with Dr. Rodriguez it was decided that it would be best to pull the line right now as she does not need it and the risk of infections and clots are high. She will get another one placed when she goes inpatient for her methotrexate. Patient has enough supplies for flushing to get her through Wednesday this week and her dressing was last changed on 8/24/17. The plan will be to schedule her in clinic on Thursday for labs, echo, and to pull her PICC line. Patient verbalized understanding of this plan and is agreeable to it.

## 2017-08-29 ENCOUNTER — DOCUMENTATION ONLY (OUTPATIENT)
Dept: ONCOLOGY | Facility: CLINIC | Age: 57
End: 2017-08-29

## 2017-08-29 NOTE — PROGRESS NOTES
Form Request Documentation    Date Received in Clinic:  8/24/2017  Name/Type of Form: [facility] Medical Information  Questions that need to be addressed:   Other: None  Date Completed: 8/29/2017  Copy Mailed to patient: Yes on 8/29/2017  Disposition of Form: Fax to Miquel Fowler at [phone] on 8/29/2017

## 2017-08-30 DIAGNOSIS — C83.398 DIFFUSE LARGE B-CELL LYMPHOMA OF EXTRANODAL SITE: ICD-10-CM

## 2017-08-31 ENCOUNTER — INFUSION THERAPY VISIT (OUTPATIENT)
Dept: ONCOLOGY | Facility: CLINIC | Age: 57
End: 2017-08-31
Attending: INTERNAL MEDICINE
Payer: COMMERCIAL

## 2017-08-31 ENCOUNTER — TELEPHONE (OUTPATIENT)
Dept: TRANSPLANT | Facility: CLINIC | Age: 57
End: 2017-08-31

## 2017-08-31 ENCOUNTER — CARE COORDINATION (OUTPATIENT)
Dept: ONCOLOGY | Facility: CLINIC | Age: 57
End: 2017-08-31

## 2017-08-31 ENCOUNTER — RADIANT APPOINTMENT (OUTPATIENT)
Dept: CARDIOLOGY | Facility: CLINIC | Age: 57
End: 2017-08-31
Attending: INTERNAL MEDICINE

## 2017-08-31 VITALS
DIASTOLIC BLOOD PRESSURE: 76 MMHG | SYSTOLIC BLOOD PRESSURE: 162 MMHG | TEMPERATURE: 98.2 F | OXYGEN SATURATION: 98 % | WEIGHT: 126.6 LBS | BODY MASS INDEX: 26.46 KG/M2 | HEART RATE: 63 BPM | RESPIRATION RATE: 18 BRPM

## 2017-08-31 DIAGNOSIS — C83.398 DIFFUSE LARGE B-CELL LYMPHOMA OF EXTRANODAL SITE: Primary | ICD-10-CM

## 2017-08-31 DIAGNOSIS — Z86.74 HISTORY OF CARDIAC ARREST: ICD-10-CM

## 2017-08-31 LAB
ALBUMIN SERPL-MCNC: 3.4 G/DL (ref 3.4–5)
ALP SERPL-CCNC: 113 U/L (ref 40–150)
ALT SERPL W P-5'-P-CCNC: 91 U/L (ref 0–50)
ANION GAP SERPL CALCULATED.3IONS-SCNC: 8 MMOL/L (ref 3–14)
ANISOCYTOSIS BLD QL SMEAR: ABNORMAL
AST SERPL W P-5'-P-CCNC: 26 U/L (ref 0–45)
BASOPHILS # BLD AUTO: 0 10E9/L (ref 0–0.2)
BASOPHILS NFR BLD AUTO: 1.4 %
BILIRUB SERPL-MCNC: 1.1 MG/DL (ref 0.2–1.3)
BUN SERPL-MCNC: 21 MG/DL (ref 7–30)
CALCIUM SERPL-MCNC: 8.7 MG/DL (ref 8.5–10.1)
CHLORIDE SERPL-SCNC: 103 MMOL/L (ref 94–109)
CO2 SERPL-SCNC: 28 MMOL/L (ref 20–32)
CREAT SERPL-MCNC: 0.49 MG/DL (ref 0.52–1.04)
DIFFERENTIAL METHOD BLD: ABNORMAL
EOSINOPHIL # BLD AUTO: 0 10E9/L (ref 0–0.7)
EOSINOPHIL NFR BLD AUTO: 2.3 %
ERYTHROCYTE [DISTWIDTH] IN BLOOD BY AUTOMATED COUNT: 24.5 % (ref 10–15)
GFR SERPL CREATININE-BSD FRML MDRD: >90 ML/MIN/1.7M2
GLUCOSE SERPL-MCNC: 111 MG/DL (ref 70–99)
HCT VFR BLD AUTO: 27.6 % (ref 35–47)
HGB BLD-MCNC: 9.1 G/DL (ref 11.7–15.7)
LYMPHOCYTES # BLD AUTO: 0.4 10E9/L (ref 0.8–5.3)
LYMPHOCYTES NFR BLD AUTO: 73 %
MCH RBC QN AUTO: 33 PG (ref 26.5–33)
MCHC RBC AUTO-ENTMCNC: 33 G/DL (ref 31.5–36.5)
MCV RBC AUTO: 100 FL (ref 78–100)
MONOCYTES # BLD AUTO: 0 10E9/L (ref 0–1.3)
MONOCYTES NFR BLD AUTO: 9.3 %
NEUTROPHILS # BLD AUTO: 0.1 10E9/L (ref 1.6–8.3)
NEUTROPHILS NFR BLD AUTO: 14 %
NRBC # BLD AUTO: 0 10*3/UL
NRBC BLD AUTO-RTO: 1 /100
PLATELET # BLD AUTO: 39 10E9/L (ref 150–450)
POIKILOCYTOSIS BLD QL SMEAR: SLIGHT
POLYCHROMASIA BLD QL SMEAR: ABNORMAL
POTASSIUM SERPL-SCNC: 3.9 MMOL/L (ref 3.4–5.3)
PROT SERPL-MCNC: 6.2 G/DL (ref 6.8–8.8)
RBC # BLD AUTO: 2.76 10E12/L (ref 3.8–5.2)
SODIUM SERPL-SCNC: 139 MMOL/L (ref 133–144)
WBC # BLD AUTO: 0.5 10E9/L (ref 4–11)

## 2017-08-31 PROCEDURE — 86900 BLOOD TYPING SEROLOGIC ABO: CPT | Performed by: PHYSICIAN ASSISTANT

## 2017-08-31 PROCEDURE — 25000128 H RX IP 250 OP 636: Mod: ZF | Performed by: INTERNAL MEDICINE

## 2017-08-31 PROCEDURE — 80053 COMPREHEN METABOLIC PANEL: CPT | Performed by: PHYSICIAN ASSISTANT

## 2017-08-31 PROCEDURE — 86850 RBC ANTIBODY SCREEN: CPT | Performed by: PHYSICIAN ASSISTANT

## 2017-08-31 PROCEDURE — 86923 COMPATIBILITY TEST ELECTRIC: CPT | Performed by: PHYSICIAN ASSISTANT

## 2017-08-31 PROCEDURE — 85025 COMPLETE CBC W/AUTO DIFF WBC: CPT | Performed by: PHYSICIAN ASSISTANT

## 2017-08-31 PROCEDURE — 86901 BLOOD TYPING SEROLOGIC RH(D): CPT | Performed by: PHYSICIAN ASSISTANT

## 2017-08-31 PROCEDURE — 36592 COLLECT BLOOD FROM PICC: CPT

## 2017-08-31 RX ORDER — HEPARIN SODIUM,PORCINE 10 UNIT/ML
5 VIAL (ML) INTRAVENOUS
Status: DISCONTINUED | OUTPATIENT
Start: 2017-08-31 | End: 2017-08-31 | Stop reason: HOSPADM

## 2017-08-31 RX ADMIN — SODIUM CHLORIDE, PRESERVATIVE FREE 5 ML: 5 INJECTION INTRAVENOUS at 06:59

## 2017-08-31 RX ADMIN — SODIUM CHLORIDE, PRESERVATIVE FREE 5 ML: 5 INJECTION INTRAVENOUS at 06:58

## 2017-08-31 ASSESSMENT — PAIN SCALES - GENERAL: PAINLEVEL: NO PAIN (0)

## 2017-08-31 NOTE — NURSING NOTE
Chief Complaint   Patient presents with     Blood Draw     pt has no orders-soledad and sent blue green and purple vial to lab-     Paged provider PA to make aware of no orders-sent note with drawn labs. Vitals taken and pt checked in for next appt. Lizzy Fisher

## 2017-08-31 NOTE — TELEPHONE ENCOUNTER
Phone consent for blood products for Ms. Michel. She has received platelets and blood in the hospital and had no questions about the process.    She verbally consented to proceed. The phone consent was witnessed by Kay Miller who also heard verbal consent from the patient. The consent document was filled out and signed and Kay was bringing it to infusion.    Lenore Rodriguez

## 2017-08-31 NOTE — PATIENT INSTRUCTIONS
Contact Numbers  [facility]: [phone]    After Hours:  [phone]  Triage: [phone]    Please call the [facility] Triage line if you experience a temperature greater than or equal to 100.5, shaking chills, have uncontrolled nausea, vomiting and/or diarrhea, dizziness, shortness of breath, chest pain, bleeding, unexplained bruising, or if you have any other new/concerning symptoms, questions or concerns.     If it is after hours, weekends, or holidays, please call the main hospital  at  [phone] and ask to speak to the Oncology doctor on call.     If you are having any concerning symptoms or wish to speak to a provider before your next infusion visit, please call your care coordinator or triage to notify them so we can adequately serve you.     If you need a refill on a narcotic prescription or other medication, please call triage before your infusion appointment.         August 2017 Sunday Monday Tuesday Wednesday Thursday Friday Saturday             1     IP TREATMENT    6:00 AM   (30 min.)   Selvin Dean, PT   [facility], [place], Physical Therapy 2     IP TREATMENT    6:00 AM   (30 min.)   Isha Tadeo, OT   [facility], [place], Occupational Therapy     IP TREATMENT    6:00 AM   (30 min.)   Selvin Dean, PT   [facility], [place], Physical Therapy 3     IP TREATMENT    6:00 AM   (30 min.)   Selvin Dean, PT   [facility], [place], Physical Therapy     IP TREATMENT    6:00 AM   (30 min.)   Isha Tadeo, OT   [facility], [place], Occupational Therapy 4     Admission   11:36 AM   Mary Babcock MD   [facility]   (Discharge: 8/8/2017) 5     IP EVALUATION    7:00 AM   (60 min.)   [facility]     IP EVALUATION    9:00 AM   (45 min.)   Amrita Lomeli[facility]     IP EVALUATION    2:15 PM   (45 min.)   Gala Arvizu, OT   [facility]     IP EVALUATION    3:00 PM   (45 min.)   Amrita Lomeli, PT   [facility],  [place], Acute Rehab PT   6     IP TREATMENT    7:00 AM   (60 min.)   Gala Arvizu, OT   [facility], Acute Rehab OT     IP TREATMENT    9:00 AM   (45 min.)   Amrita Lomeli, PT   [facility], Acute Rehab PT     IP TREATMENT    3:00 PM   (45 min.)   Amrita Lomeli, PT   [facility], Acute Rehab PT 7     IP TREATMENT    8:15 AM   (60 min.)   Catrina Wyatt, PT   [facility], Acute Rehab PT     IP TREATMENT   10:00 AM   (60 min.)   Luz Chambers, OT   [facility], Acute Rehab OT     IP TREATMENT   11:00 AM   (30 min.)   Catrina Wyatt, PT   [facility], Acute Rehab PT     IP TREATMENT    2:00 PM   (45 min.)   Luz Chambers, OT   [facility], Acute Rehab OT 8     Admission    5:00 PM   Sheldon Soto MD   [address] [facility]   (Discharge: 8/12/2017)     XR CHEST 2 VIEWS    5:55 PM   (15 min.)   [email or web address]   [facility],  Radiology     CT SOFT TISSUE NECK W    8:40 PM   (30 min.)   [email or web address]   [facility], CT 9     10     IP EVALUATION    6:00 AM   (60 min.)   Ruben Amaya, PT   [facility], Physical Therapy 11     IP EVALUATION    6:00 AM   (60 min.)   Gisell Gottlieb, OT   [facility], Occupational Therapy     IP TREATMENT    6:00 AM   (30 min.)   Archana Kingsley, PT   [facility], Physical Therapy 12     IP TREATMENT    6:30 AM   (30 min.)   Archana Kingsley, PT   [facility], Physical Therapy     Admission   11:28 AM   Regino Josue MD   [facility]   (Discharge: 8/19/2017)     IP EVALUATION    3:45 PM   (60 min.)   Robert Gavin, PT   [facility], Acute Rehab PT   13     IP TREATMENT    9:15 AM   (60 min.)   Robert Gavin, PT   [facility], Acute Rehab PT     IP EVALUATION   11:15 AM   (45 min.)   Carlos Stephenson, OTR   [facility], Acute Rehab OT     IP EVALUATION    2:15 PM   (45 min.)   Carlos Stephenson, OTR   [facility], Acute Rehab OT     IP TREATMENT    3:15 PM   (45 min.)   Robert Gavin, PT   [facility], Acute Rehab PT  14     IP TREATMENT    7:00 AM   (60 min.)   Luz Chambers, OT   [facility], [place], Acute Rehab OT     IP TREATMENT    8:15 AM   (60 min.)   Catrina Wyatt, PT   [facility], Acute Rehab PT     IP TREATMENT   11:00 AM   (45 min.)   Catrina Wyatt, PT   [facility], [place], Acute Rehab PT     IP TREATMENT    3:30 PM   (30 min.)   Luz Chambers, OT   [facility], [place], Acute Rehab OT 15     IP TREATMENT   10:00 AM   (60 min.)   Soraya Oro, PTA   [facility], Acute Rehab PT     IP TREATMENT   11:15 AM   (45 min.)   Luz Chambers, OT   [facility], [place], Acute Rehab OT     IP TREATMENT    1:15 PM   (60 min.)   Becky Pinedo   [facility], Acute Rehab OT     IP TREATMENT    2:30 PM   (30 min.)   Soraya Oro, PTA   [facility], [place], Acute Rehab PT 16     IP TREATMENT    9:30 AM   (60 min.)   Lali Boothe, PT   [facility], Acute Rehab PT     IP TREATMENT   11:00 AM   (60 min.)   Luz Chambers, OT   [facility], [place], Acute Rehab OT     [facility] ONC RETURN   12:15 PM   (30 min.)   Lenore Rodriguez MD   [facility] Blood and Marrow Transplant     IP TREATMENT    2:00 PM   (30 min.)   Luz Chambers, OT   [facility], [place], Acute Rehab OT     IP TREATMENT    2:30 PM   (30 min.)   Lali Boothe, PT   [facility], Acute Rehab PT     IP TREATMENT    2:30 PM   (30 min.)   Luz Chambers, OT   [facility], Acute Rehab OT     IP TREATMENT    3:00 PM   (45 min.)   Lali Boothe, PT   [facility], Acute Rehab PT 17     IP TREATMENT    9:15 AM   (60 min.)   Becky Pinedo   [facility], Acute Rehab OT     IP TREATMENT   10:45 AM   (60 min.)   Catrina Wyatt, PT   [facility], Acute Rehab PT     IP TREATMENT    1:45 PM   (45 min.)   Becky Pinedo M   [facility], Acute Rehab OT     IP TREATMENT    2:30 PM   (30 min.)   Amrita Lomeli, PT   [facility], Acute Rehab PT 18     IP TREATMENT    9:15 AM   (60 min.)   Luz Chambers, OT   [facility], Acute Rehab OT     IP TREATMENT   11:00 AM   (60  min.)   Pao Lindsey, PTA   [facility], Acute Rehab PT     [facility] PICC & IV MED    1:00 PM   (90 min.)   Marisol Trevino, RN   [facility], [facility]     IP TREATMENT    2:30 PM   (45 min.)   Francisco Javier Terrazas, PTA   [facility], Acute Rehab PT     IP TREATMENT    3:30 PM   (30 min.)   Khushboo Ku, OT   [facility], Acute Rehab OT 19     IP TREATMENT   10:30 AM   (30 min.)   Becca Yepez, PT   [facility], Acute Rehab PT   20     21     [facility] DRAW    4:45 PM   (15 min.)    [facility]   [facility]     [facility] LUMBAR PUNCTURE    5:25 PM   (50 min.)   Kalpana Alvarado PA-C   [facility] 22     23     XR LP SPINAL TAP DIAGNOSTIC    8:15 AM   (30 min.)   [id number]   [facility] Xray 24     [facility] DRAW    7:00 AM   (15 min.)    [facility] DRAW   [facility] Lab Draw     [facility] ONC INFUSION 360    7:30 AM   (360 min.)    ONCOLOGY INFUSION   [facility]     LONG    7:45 AM   (60 min.)   Maria Esther Pedroza [facility] Medication Therapy Management 25     26       27     28     29     30     31     [facility] DRAW    6:30 AM   (15 min.)    [facility] DRAW   [facility] Lab Draw     ECH LIMITED    7:00 AM   (60 min.)   [id number]   [facility] Echo     [facility] ONC INFUSION 60    8:00 AM   (60 min.)    ONCOLOGY INFUSION   [facility]                      September 2017 Sunday Monday Tuesday Wednesday Thursday Friday Saturday                            1     2     [facility] DRAW    8:00 AM   (15 min.)   [facility]   [facility]     [facility] ONC INFUSION 120    8:30 AM   (120 min.)    ONCOLOGY INFUSION   [facility]   3     4     [facility] DRAW    9:00 AM   (15 min.)    [facility] LAB DRAW   [facility] Lab Draw     [facility] ONC INFUSION 120    9:30 AM   (120 min.)    ONCOLOGY INFUSION   [facility]  5     6     [facility] DRAW    7:15 AM   (15 min.)    [facility]   [facility]     [facility] RETURN    7:45 AM   (50 min.)   Kalpana Alvarado PA-C   [facility] 7     8     9       10     11     12     13     [facility] DRAW    8:00 AM   (15 min.)    [facility] DRAW   [facility] Lab Draw     [facility] ONC INFUSION 360    8:30 AM   (360 min.)    ONCOLOGY INFUSION   [facility]     [facility] ONC RETURN   12:00 PM   (30 min.)   Lenore Rodriguez MD   [facility] Blood and Marrow Transplant 14     15     UMP NEW    9:45 AM   (60 min.)   Pj Cunha MD   [facility] Rheumatology 16       17     18     19     20     21     22     23       24     25     26     27     UMP IMPLANTED DEFIBRILLATOR   12:45 PM   (30 min.)   1,  Cv Device   [facility]     UMP RETURN ARRHYTHMIA    1:15 PM   (30 min.)   Juan Eaton MD   [facility] 28     29     30                 Recent Results (from the past 24 hour(s))   CBC with platelets differential    Collection Time: 08/31/17  7:41 AM   Result Value Ref Range    WBC 0.5 (LL) 4.0 - 11.0 10e9/L    RBC Count 2.76 (L) 3.8 - 5.2 10e12/L    Hemoglobin 9.1 (L) 11.7 - 15.7 g/dL    Hematocrit 27.6 (L) 35.0 - 47.0 %     78 - 100 fl    MCH 33.0 26.5 - 33.0 pg    MCHC 33.0 31.5 - 36.5 g/dL    RDW 24.5 (H) 10.0 - 15.0 %    Platelet Count 39 (LL) 150 - 450 10e9/L    Diff Method Manual Differential     % Neutrophils 14.0 %    % Lymphocytes 73.0 %    % Monocytes 9.3 %    % Eosinophils 2.3 %    % Basophils 1.4 %    Nucleated RBCs 1 (H) 0 /100    Absolute Neutrophil 0.1 (LL) 1.6 - 8.3 10e9/L    Absolute Lymphocytes 0.4 (L) 0.8 - 5.3 10e9/L    Absolute Monocytes 0.0 0.0 - 1.3 10e9/L    Absolute Eosinophils 0.0 0.0 - 0.7 10e9/L    Absolute Basophils 0.0 0.0 - 0.2 10e9/L    Absolute Nucleated RBC 0.0     Anisocytosis Marked     Poikilocytosis Slight     Polychromasia Marked    Comprehensive metabolic panel     Collection Time: 08/31/17  7:41 AM   Result Value Ref Range    Sodium 139 133 - 144 mmol/L    Potassium 3.9 3.4 - 5.3 mmol/L    Chloride 103 94 - 109 mmol/L    Carbon Dioxide 28 20 - 32 mmol/L    Anion Gap 8 3 - 14 mmol/L    Glucose 111 (H) 70 - 99 mg/dL    Urea Nitrogen 21 7 - 30 mg/dL    Creatinine 0.49 (L) 0.52 - 1.04 mg/dL    GFR Estimate >90 >60 mL/min/1.7m2    GFR Estimate If Black >90 >60 mL/min/1.7m2    Calcium 8.7 8.5 - 10.1 mg/dL    Bilirubin Total 1.1 0.2 - 1.3 mg/dL    Albumin 3.4 3.4 - 5.0 g/dL    Protein Total 6.2 (L) 6.8 - 8.8 g/dL    Alkaline Phosphatase 113 40 - 150 U/L    ALT 91 (H) 0 - 50 U/L    AST 26 0 - 45 U/L   ABO/Rh type and screen    Collection Time: 08/31/17  7:42 AM   Result Value Ref Range    ABO O     RH(D) Neg     Antibody Screen Neg     Test Valid Only At          [facility]    Specimen Expires 09/03/2017

## 2017-08-31 NOTE — PROGRESS NOTES
Completed Workabilitly Form as requested by patient and faxed it this AM (8/31/17) to [phone].  Copy of form sent in mail to patient at: [id number] [facility] [id number]

## 2017-08-31 NOTE — MR AVS SNAPSHOT
After Visit Summary   8/31/2017    Amelia Michel    MRN: [id number]           Patient Information     Date Of Birth          1960        Visit Information        Provider Department      8/31/2017 8:00 AM UC 18 ATC;  ONCOLOGY INFUSION [facility]        Today's Diagnoses     Diffuse large B-cell lymphoma of extranodal site (H)    -  1      Care Instructions    Contact Numbers  [facility]: [phone]    After Hours:  [phone]  Triage: [phone]    Please call the [facility] Triage line if you experience a temperature greater than or equal to 100.5, shaking chills, have uncontrolled nausea, vomiting and/or diarrhea, dizziness, shortness of breath, chest pain, bleeding, unexplained bruising, or if you have any other new/concerning symptoms, questions or concerns.     If it is after hours, weekends, or holidays, please call the main hospital  at  [phone] and ask to speak to the Oncology doctor on call.     If you are having any concerning symptoms or wish to speak to a provider before your next infusion visit, please call your care coordinator or triage to notify them so we can adequately serve you.     If you need a refill on a narcotic prescription or other medication, please call triage before your infusion appointment.         August 2017 Sunday Monday Tuesday Wednesday Thursday Friday Saturday             1     IP TREATMENT    6:00 AM   (30 min.)   Selvin Dean, PT   [facility], [place], Physical Therapy 2     IP TREATMENT    6:00 AM   (30 min.)   Isha Tadeo, OT   [facility], [place], Occupational Therapy     IP TREATMENT    6:00 AM   (30 min.)   Selvin Dean, PT   [facility], [place], Physical Therapy 3     IP TREATMENT    6:00 AM   (30 min.)   Selvin Dean, PT   [facility], [place], Physical Therapy     IP TREATMENT    6:00 AM   (30 min.)   Isha Tadeo, OT   [facility], [place], Occupational Therapy 4     Admission   11:36 AM   Mary Babcock  MD   [facility]   (Discharge: 8/8/2017) 5     IP EVALUATION    7:00 AM   (60 min.)   Gala Arvizu, OT   [facility], Acute Rehab OT     IP EVALUATION    9:00 AM   (45 min.)   Amrita oLmeli, PT   [facility], Acute Rehab PT     IP EVALUATION    2:15 PM   (45 min.)   Gala Arvizu, OT   [facility], Acute Rehab OT     IP EVALUATION    3:00 PM   (45 min.)   Amrita Lomeli, PT   [facility], Acute Rehab PT   6     IP TREATMENT    7:00 AM   (60 min.)   Gala Arvizu, OT   [facility], Acute Rehab OT     IP TREATMENT    9:00 AM   (45 min.)   Amrita Lomeli, PT   [facility], Acute Rehab PT     IP TREATMENT    3:00 PM   (45 min.)   Amrita Lomeli, PT   [facility], Acute Rehab PT 7     IP TREATMENT    8:15 AM   (60 min.)   Catrina Wyatt, PT   [facility], Acute Rehab PT     IP TREATMENT   10:00 AM   (60 min.)   Luz Chambers, OT   [facility], Acute Rehab OT     IP TREATMENT   11:00 AM   (30 min.)   Catrina Wyatt, PT   [facility], Acute Rehab PT     IP TREATMENT    2:00 PM   (45 min.)   Luz Chambers, OT   [facility], Acute Rehab OT 8     Admission    5:00 PM   Sheldon Soto MD   [facility] [facility]   (Discharge: 8/12/2017)     XR CHEST 2 VIEWS    5:55 PM   (15 min.)   [id number]   [facility],  Radiology     CT SOFT TISSUE NECK W    8:40 PM   (30 min.)   [id number]   [facility], CT 9     10     IP EVALUATION    6:00 AM   (60 min.)   Ruben Amaya, PT   [facility], Physical Therapy 11     IP EVALUATION    6:00 AM   (60 min.)   Gisell Gottlieb, OT   [facility], Occupational Therapy     IP TREATMENT    6:00 AM   (30 min.)   Archana Kingsley, PT   [facility], Physical Therapy 12     IP TREATMENT    6:30 AM   (30 min.)   Archana Kingsley, PT   [facility], [place], Physical Therapy     Admission   11:28 AM   Regino Josue MD   [facility]   (Discharge: 8/19/2017)     IP EVALUATION    3:45 PM   (60 min.)   Robert Gavin, PT   [facility],  [place], Acute Rehab PT   13     IP TREATMENT    9:15 AM   (60 min.)   Robert Gavin, PT   [facility], Acute Rehab PT     IP EVALUATION   11:15 AM   (45 min.)   Carlos Stephenson, OTR   [facility], Acute Rehab OT     IP EVALUATION    2:15 PM   (45 min.)   Carlos Stephenson, OTR   [facility], Acute Rehab OT     IP TREATMENT    3:15 PM   (45 min.)   Robert Gavin, PT   [facility], Acute Rehab PT 14     IP TREATMENT    7:00 AM   (60 min.)   Luz Chambers, OT   [facility], Acute Rehab OT     IP TREATMENT    8:15 AM   (60 min.)   Catrina Wyatt, PT   [facility], Acute Rehab PT     IP TREATMENT   11:00 AM   (45 min.)   Catrina Wyatt, PT   [facility], Acute Rehab PT     IP TREATMENT    3:30 PM   (30 min.)   Luz Chambers, OT   [facility], Acute Rehab OT 15     IP TREATMENT   10:00 AM   (60 min.)   Soraya Oro, PTA   [facility], Acute Rehab PT     IP TREATMENT   11:15 AM   (45 min.)   Luz Chambers OT   [facility], Acute Rehab OT     IP TREATMENT    1:15 PM   (60 min.)   Becky Pinedo   [facility], Acute Rehab OT     IP TREATMENT    2:30 PM   (30 min.)   Soraya Oro, PTA   [facility], Acute Rehab PT 16     IP TREATMENT    9:30 AM   (60 min.)   Lali Boothe, PT   [facility], Acute Rehab PT     IP TREATMENT   11:00 AM   (60 min.)   Luz Chambers, OT   [facility], Acute Rehab OT     UMP ONC RETURN   12:15 PM   (30 min.)   Lenore Rodriguez MD   [facility] Blood and Marrow Transplant     IP TREATMENT    2:00 PM   (30 min.)   Luz Chambers, OT   [facility], Acute Rehab OT     IP TREATMENT    2:30 PM   (30 min.)   Lali Boothe, PT   [facility], Acute Rehab PT     IP TREATMENT    2:30 PM   (30 min.)   Luz Chambers OT   [facility], Acute Rehab OT     IP TREATMENT    3:00 PM   (45 min.)   Lali Boothe, PT   [facility], Acute Rehab PT 17     IP TREATMENT    9:15 AM   (60 min.)   Becky Pinedo   [facility], Acute Rehab OT     IP  TREATMENT   10:45 AM   (60 min.)   Catrina Wyatt, PT   [facility], Acute Rehab PT     IP TREATMENT    1:45 PM   (45 min.)   Becky Pinedo M   [facility], Acute Rehab OT     IP TREATMENT    2:30 PM   (30 min.)   Amrita Lomeli, PT   [facility], Acute Rehab PT 18     IP TREATMENT    9:15 AM   (60 min.)   Luz Chambers, OT   [facility], Acute Rehab OT     IP TREATMENT   11:00 AM   (60 min.)   Pao Lindsey, PTA   [facility], Acute Rehab PT     UR PICC & IV MED    1:00 PM   (90 min.)   Marisol Trevino, RN   [facility], [facility]     IP TREATMENT    2:30 PM   (45 min.)   Francisco Javier Terrazas, PTA   [facility], Acute Rehab PT     IP TREATMENT    3:30 PM   (30 min.)   Khushboo Ku, OT   [facility], Acute Rehab OT 19     IP TREATMENT   10:30 AM   (30 min.)   Becca Yepez, PT   [facility], Acute Rehab PT   20     21     P [facility] LAB DRAW    4:45 PM   (15 min.)    [facility] LAB DRAW   [facility] Lab Draw     P LUMBAR PUNCTURE    5:25 PM   (50 min.)   Kalpana Alvarado PA-C   [facility] 22     23     XR LP SPINAL TAP DIAGNOSTIC    8:15 AM   (30 min.)   [id number]   [facility] Xray 24     P [facility] LAB DRAW    7:00 AM   (15 min.)    [facility] LAB DRAW   [facility] Lab Draw     UMP ONC INFUSION 360    7:30 AM   (360 min.)    ONCOLOGY INFUSION   [facility]     LONG    7:45 AM   (60 min.)   Maria Esther Pedroza, [facility] Medication Therapy Management 25     26       27     28     29     30     31     [facility] LAB DRAW    6:30 AM   (15 min.)    [facility] LAB DRAW   [facility] Lab Draw     ECH LIMITED    7:00 AM   (60 min.)   [id number]   [facility] Echo     P ONC INFUSION 60    8:00 AM   (60 min.)    ONCOLOGY INFUSION   [facility]                      September 2017 Sunday Monday Tuesday Wednesday Thursday Friday Saturday                            1     2     UMP  [facility] LAB DRAW    8:00 AM   (15 min.)    [facility] LAB DRAW   [facility] Draw     UMP ONC INFUSION 120    8:30 AM   (120 min.)    ONCOLOGY INFUSION   [facility]   3     4     UMP [facility] LAB DRAW    9:00 AM   (15 min.)    [facility] LAB DRAW   [facility] Lab Draw     UMP ONC INFUSION 120    9:30 AM   (120 min.)    ONCOLOGY INFUSION   [facility] 5     6     UMP [facility] LAB DRAW    7:15 AM   (15 min.)    [facility]     UMP RETURN    7:45 AM   (50 min.)   Kalpana Alvarado PA-C   [facility] 7     8     9       10     11     12     13     UMP [facility] LAB DRAW    8:00 AM   (15 min.)    [facility] LAB DRAW   [facility] Lab Draw     UMP ONC INFUSION 360    8:30 AM   (360 min.)    ONCOLOGY INFUSION   [facility]     UMP ONC RETURN   12:00 PM   (30 min.)   Lenore Rodriguez MD   [facility] Blood and Marrow Transplant 14     15     UMP NEW    9:45 AM   (60 min.)   Pj Cunha MD   [facility] Rheumatology 16       17     18     19     20     21     22     23       24     25     26     27     UMP IMPLANTED DEFIBRILLATOR   12:45 PM   (30 min.)   1,  Cv Device   [facility]     UMP RETURN ARRHYTHMIA    1:15 PM   (30 min.)   Juan Eaton MD   [facility] 28     29     30                 Recent Results (from the past 24 hour(s))   CBC with platelets differential    Collection Time: 08/31/17  7:41 AM   Result Value Ref Range    WBC 0.5 (LL) 4.0 - 11.0 10e9/L    RBC Count 2.76 (L) 3.8 - 5.2 10e12/L    Hemoglobin 9.1 (L) 11.7 - 15.7 g/dL    Hematocrit 27.6 (L) 35.0 - 47.0 %     78 - 100 fl    MCH 33.0 26.5 - 33.0 pg    MCHC 33.0 31.5 - 36.5 g/dL    RDW 24.5 (H) 10.0 - 15.0 %    Platelet Count 39 (LL) 150 - 450 10e9/L    Diff Method Manual Differential     % Neutrophils 14.0 %    % Lymphocytes 73.0 %    % Monocytes 9.3 %    % Eosinophils 2.3 %    % Basophils 1.4 %     Nucleated RBCs 1 (H) 0 /100    Absolute Neutrophil 0.1 (LL) 1.6 - 8.3 10e9/L    Absolute Lymphocytes 0.4 (L) 0.8 - 5.3 10e9/L    Absolute Monocytes 0.0 0.0 - 1.3 10e9/L    Absolute Eosinophils 0.0 0.0 - 0.7 10e9/L    Absolute Basophils 0.0 0.0 - 0.2 10e9/L    Absolute Nucleated RBC 0.0     Anisocytosis Marked     Poikilocytosis Slight     Polychromasia Marked    Comprehensive metabolic panel    Collection Time: 08/31/17  7:41 AM   Result Value Ref Range    Sodium 139 133 - 144 mmol/L    Potassium 3.9 3.4 - 5.3 mmol/L    Chloride 103 94 - 109 mmol/L    Carbon Dioxide 28 20 - 32 mmol/L    Anion Gap 8 3 - 14 mmol/L    Glucose 111 (H) 70 - 99 mg/dL    Urea Nitrogen 21 7 - 30 mg/dL    Creatinine 0.49 (L) 0.52 - 1.04 mg/dL    GFR Estimate >90 >60 mL/min/1.7m2    GFR Estimate If Black >90 >60 mL/min/1.7m2    Calcium 8.7 8.5 - 10.1 mg/dL    Bilirubin Total 1.1 0.2 - 1.3 mg/dL    Albumin 3.4 3.4 - 5.0 g/dL    Protein Total 6.2 (L) 6.8 - 8.8 g/dL    Alkaline Phosphatase 113 40 - 150 U/L    ALT 91 (H) 0 - 50 U/L    AST 26 0 - 45 U/L   ABO/Rh type and screen    Collection Time: 08/31/17  7:42 AM   Result Value Ref Range    ABO O     RH(D) Neg     Antibody Screen Neg     Test Valid Only At          [facility]    Specimen Expires 09/03/2017                  Follow-ups after your visit        Your next 10 appointments already scheduled     Sep 02, 2017  8:00 AM CDT   [facility] Lab Draw with [facility] LAB DRAW   [facility] Lab Draw ([facility])    [address] [place] [id number] [phone]            Sep 02, 2017  8:30 AM CDT   Infusion 120 with  ONCOLOGY INFUSION,  12 ATC   [facility] ([facility])    [address] [place] [id number]   [phone]            Sep 04, 2017  9:00 AM CDT   [facility] Lab Draw with  [facility] LAB DRAW   [facility]    [address] [address]   [phone]            Sep 04, 2017  9:30 AM CDT   Infusion 120 with UC ONCOLOGY INFUSION, UC 10 ATC   [facility] ([facility])    [address] [place] [address]   [phone]            Sep 06, 2017  7:15 AM CDT   [facility] Lab Draw with [facility] LAB DRAW   [facility]    [address] [place] [address]   [phone]            Sep 06, 2017  8:00 AM CDT   (Arrive by 7:45 AM)   Return Visit with Kalpana Alvarado PA-C   [facility] ([facility])    [address] [place] [address]   [phone]            Sep 13, 2017  8:00 AM CDT   [facility] Lab Draw with UC [facility] LAB DRAW   [facility] Lab Draw ([facility])    [address] [place] [address]   [phone]            Sep 13, 2017  8:30 AM CDT   Infusion 360 with UC ONCOLOGY INFUSION   [facility] ([facility])    [address] [place] [address]   [phone]              Who to contact     If you have questions or need follow up information about today's clinic visit or your schedule please contact [facility] directly at [phone].  Normal or non-critical lab and imaging results will be communicated to you by MyChart, letter or phone within 4 business days after the clinic has received the results. If you do not hear from us within 7 days, please contact the clinic through MyChart or phone. If you have a critical or abnormal lab result, we will notify you by phone as soon as possible.  Submit refill requests through [place] or call your pharmacy and they will forward the refill request to  us. Please allow 3 business days for your refill to be completed.          Additional Information About Your Visit        MyChart Information     [email or web address] gives you secure access to your electronic health record. If you see a primary care provider, you can also send messages to your care team and make appointments. If you have questions, please call your primary care clinic.  If you do not have a primary care provider, please call [phone] and they will assist you.        Care EveryWhere ID     This is your Care EveryWhere ID. This could be used by other organizations to access your [facility] medical records  [id number]        Your Vitals Were     Pulse Temperature Respirations Pulse Oximetry BMI (Body Mass Index)       63 98.2  F (36.8  C) (Oral) 18 98% 26.46 kg/m2        Blood Pressure from Last 3 Encounters:   08/31/17 162/76   08/24/17 133/64   08/23/17 130/67    Weight from Last 3 Encounters:   08/31/17 57.4 kg (126 lb 9.6 oz)   08/24/17 55.7 kg (122 lb 11.2 oz)   08/21/17 54.9 kg (121 lb)              Today, you had the following     No orders found for display       Primary Care Provider Office Phone # Fax #    Comfort Sabillon [phone] [address] [facility] [facility] [address]        Equal Access to Services     SARAH HEAD AH: Hadii aad ku hadasho Soomaali, waaxda luqadaha, qaybta kaalmada adeegyada, durga price haynegin hermosillo . So [facility] [place] [phone].    ATENCIÓN: Si habla español, tiene a sarmiento disposición servicios gratuitos de asistencia lingüística. Llame al [phone].    We comply with applicable federal civil rights laws and [place] laws. We do not discriminate on the basis of race, color, national origin, age, disability sex, sexual orientation or gender identity.            Thank you!     Thank you for choosing [facility] CANCER [facility]  for your care. Our goal is always to provide you with excellent care. Hearing back from our patients is one way we can  continue to improve our services. Please take a few minutes to complete the written survey that you may receive in the mail after your visit with us. Thank you!             Your Updated Medication List - Protect others around you: Learn how to safely use, store and throw away your medicines at www.disposemymeds.org.          This list is accurate as of: 8/31/17  1:01 PM.  Always use your most recent med list.                   Brand Name Dispense Instructions for use Diagnosis    acetaminophen 325 MG tablet    TYLENOL    100 tablet    Take 2 tablets (650 mg) by mouth every 4 hours as needed for mild pain    Rheumatoid arthritis involving multiple sites with positive rheumatoid factor (H)       acyclovir 400 MG tablet    ZOVIRAX    60 tablet    Take 1 tablet (400 mg) by mouth 2 times daily    Diffuse large B-cell lymphoma, unspecified body region (H)       allopurinol 300 MG tablet    ZYLOPRIM    30 tablet    Take 1 tablet (300 mg) by mouth daily    Diffuse large B-cell lymphoma, unspecified body region (H)       ascorbic acid 500 MG Tabs     30 tablet    Take 1 tablet (500 mg) by mouth daily    Diffuse large B-cell lymphoma, unspecified body region (H)       atenolol 25 MG tablet    TENORMIN    60 tablet    Take 1 tablet (25 mg) by mouth 2 times daily    Atrial tachycardia (H)       calcium-vitamin D 600-400 MG-UNIT per tablet    CALTRATE    60 tablet    Take 2 tablets by mouth every morning    Diffuse large B-cell lymphoma, unspecified body region (H)       digoxin 125 MCG tablet    LANOXIN    30 tablet    Take 1 tablet (125 mcg) by mouth daily    Atrial tachycardia (H)       fluconazole 200 MG tablet    DIFLUCAN    30 tablet    Take 1 tablet (200 mg) by mouth daily    Diffuse large B-cell lymphoma, unspecified body region (H)       folic acid 1 MG tablet    FOLVITE    30 tablet    Take 1 tablet (1 mg) by mouth daily    Diffuse large B-cell lymphoma, unspecified body region (H)       gabapentin 100 MG capsule     NEURONTIN    120 capsule    Take 2 capsules (200 mg) by mouth 3 times daily    Critical illness myopathy       lactobacillus rhamnosus (GG) capsule     60 capsule    Take 1 capsule by mouth 2 times daily    Physical deconditioning       levofloxacin 250 MG tablet    LEVAQUIN    30 tablet    Take 1 tablet (250 mg) by mouth daily    Diffuse large B-cell lymphoma, unspecified body region (H)       multivitamin, therapeutic with minerals Tabs tablet     30 each    Take 1 tablet by mouth daily    Diffuse large B-cell lymphoma, unspecified body region (H)       potassium chloride SA 20 MEQ CR tablet    K-DUR/KLOR-CON M    90 tablet    Take 1 tablet (20 mEq) by mouth daily    Diffuse large B-cell lymphoma, unspecified body region (H)       predniSONE 5 MG tablet    DELTASONE    30 tablet    Take 1 tablet (5 mg) by mouth daily    Diffuse large B-cell lymphoma, unspecified body region (H)       thiamine 50 MG Tabs     30 tablet    Take 1 tablet (50 mg) by mouth daily    Diffuse large B-cell lymphoma, unspecified body region (H)

## 2017-08-31 NOTE — PROGRESS NOTES
Infusion Nursing Note:  Amelia Michel presents today for PICC DC.    Patient seen by provider today: No    Treatment Conditions:  Lab Results   Component Value Date    HGB 9.1 08/31/2017     Lab Results   Component Value Date    WBC 0.5 08/31/2017      Lab Results   Component Value Date    ANEU 0.1 08/31/2017     Lab Results   Component Value Date    PLT 39 08/31/2017      Lab Results   Component Value Date     08/31/2017                   Lab Results   Component Value Date    POTASSIUM 3.9 08/31/2017                  Lab Results   Component Value Date    CR 0.49 08/31/2017                   Lab Results   Component Value Date    VIKTORIYA 8.7 08/31/2017                Lab Results   Component Value Date    BILITOTAL 1.1 08/31/2017           Lab Results   Component Value Date    ALBUMIN 3.4 08/31/2017                    Lab Results   Component Value Date    ALT 91 08/31/2017           Lab Results   Component Value Date    AST 26 08/31/2017           Intravenous Access:  PICC.      Note:   Order in chart to DC PICC today.  PICC removed in its entirety per protocol.   IB sent to Dr Rodriguez if pt needs follow up sooner than 9/6.  Per Dr Rodriguez, she will have pt return over the weekend for labs and possible transfusions, ARLETTE Rizo will schedule.  No Prescriptions filled today.   D/C in care of spouse.  Pt will return 9/2 for possible platelets ARLETTE Rizo to follow up with pt regarding weekend plan per note in chart..       Lorena Oates RN

## 2017-08-31 NOTE — PROGRESS NOTES
Reason for Outgoing call:    Called patient this morning per the request of Dr. Rodriguez to inform the patient of their results of the cardiac echo and to review scheduling plans for possible transfusions of blood products over the weekend. Her cardiac echo from this morning looked good with an EF of 55-60%. Dr. Rodriguez would like her to come in on Saturday and Monday for labs and possible transfusion of platelets.       Patients Questions/Concerns:    Patient verbalize understanding of plan and was grateful for the results of the cardiac echo.    Nursing Action/Patient Instruction:    Informed patient that she is scheduled for Saturday at 8 AM for labs and 8:30 AM transfusion if need and then on Monday 9 AM labs and possible transfusion at 9:30 AM.    Patient Response/Evaluation:    Patient confirmed that the weekend schedule will work for her and that she will be at scheduled appointments as requested.  RNCC encouraged patient to call if she has any questions, comments, or concerns.

## 2017-09-01 LAB
ABO + RH BLD: NORMAL
ABO + RH BLD: NORMAL
BLD GP AB SCN SERPL QL: NORMAL
BLD PROD TYP BPU: NORMAL
BLOOD BANK CMNT PATIENT-IMP: NORMAL
NUM BPU REQUESTED: 1
SPECIMEN EXP DATE BLD: NORMAL

## 2017-09-02 ENCOUNTER — INFUSION THERAPY VISIT (OUTPATIENT)
Dept: ONCOLOGY | Facility: CLINIC | Age: 57
End: 2017-09-02
Attending: INTERNAL MEDICINE
Payer: COMMERCIAL

## 2017-09-02 ENCOUNTER — APPOINTMENT (OUTPATIENT)
Dept: LAB | Facility: CLINIC | Age: 57
End: 2017-09-02
Attending: INTERNAL MEDICINE
Payer: COMMERCIAL

## 2017-09-02 VITALS
RESPIRATION RATE: 16 BRPM | SYSTOLIC BLOOD PRESSURE: 119 MMHG | HEART RATE: 71 BPM | TEMPERATURE: 97.7 F | DIASTOLIC BLOOD PRESSURE: 72 MMHG | OXYGEN SATURATION: 98 %

## 2017-09-02 DIAGNOSIS — C83.398 DIFFUSE LARGE B-CELL LYMPHOMA OF EXTRANODAL SITE: Primary | ICD-10-CM

## 2017-09-02 LAB
ACANTHOCYTES BLD QL SMEAR: SLIGHT
ANISOCYTOSIS BLD QL SMEAR: ABNORMAL
BASOPHILS # BLD AUTO: 0.1 10E9/L (ref 0–0.2)
BASOPHILS NFR BLD AUTO: 2.6 %
BLD PROD TYP BPU: NORMAL
BLD PROD TYP BPU: NORMAL
BLD UNIT ID BPU: 0
BLD UNIT ID BPU: 0
BLOOD PRODUCT CODE: NORMAL
BLOOD PRODUCT CODE: NORMAL
BPU ID: NORMAL
BPU ID: NORMAL
DIFFERENTIAL METHOD BLD: ABNORMAL
EOSINOPHIL # BLD AUTO: 0 10E9/L (ref 0–0.7)
EOSINOPHIL NFR BLD AUTO: 0 %
ERYTHROCYTE [DISTWIDTH] IN BLOOD BY AUTOMATED COUNT: 24.5 % (ref 10–15)
HCT VFR BLD AUTO: 26.9 % (ref 35–47)
HGB BLD-MCNC: 9 G/DL (ref 11.7–15.7)
HYPOCHROMIA BLD QL: PRESENT
LYMPHOCYTES # BLD AUTO: 0.6 10E9/L (ref 0.8–5.3)
LYMPHOCYTES NFR BLD AUTO: 17.1 %
MACROCYTES BLD QL SMEAR: PRESENT
MCH RBC QN AUTO: 32.7 PG (ref 26.5–33)
MCHC RBC AUTO-ENTMCNC: 33.5 G/DL (ref 31.5–36.5)
MCV RBC AUTO: 98 FL (ref 78–100)
METAMYELOCYTES # BLD: 0.1 10E9/L
METAMYELOCYTES NFR BLD MANUAL: 3.4 %
MICROCYTES BLD QL SMEAR: PRESENT
MONOCYTES # BLD AUTO: 0.4 10E9/L (ref 0–1.3)
MONOCYTES NFR BLD AUTO: 11.1 %
MYELOCYTES # BLD: 0 10E9/L
MYELOCYTES NFR BLD MANUAL: 0.8 %
NEUTROPHILS # BLD AUTO: 2.3 10E9/L (ref 1.6–8.3)
NEUTROPHILS NFR BLD AUTO: 65 %
OVALOCYTES BLD QL SMEAR: SLIGHT
PLATELET # BLD AUTO: 32 10E9/L (ref 150–450)
POIKILOCYTOSIS BLD QL SMEAR: SLIGHT
POLYCHROMASIA BLD QL SMEAR: SLIGHT
RBC # BLD AUTO: 2.75 10E12/L (ref 3.8–5.2)
TRANSFUSION STATUS PATIENT QL: NORMAL
WBC # BLD AUTO: 3.5 10E9/L (ref 4–11)

## 2017-09-02 PROCEDURE — 86900 BLOOD TYPING SEROLOGIC ABO: CPT | Performed by: INTERNAL MEDICINE

## 2017-09-02 PROCEDURE — 86850 RBC ANTIBODY SCREEN: CPT | Performed by: INTERNAL MEDICINE

## 2017-09-02 PROCEDURE — 86923 COMPATIBILITY TEST ELECTRIC: CPT | Performed by: INTERNAL MEDICINE

## 2017-09-02 PROCEDURE — 36592 COLLECT BLOOD FROM PICC: CPT

## 2017-09-02 PROCEDURE — 85025 COMPLETE CBC W/AUTO DIFF WBC: CPT | Performed by: INTERNAL MEDICINE

## 2017-09-02 PROCEDURE — 86901 BLOOD TYPING SEROLOGIC RH(D): CPT | Performed by: INTERNAL MEDICINE

## 2017-09-02 RX ORDER — CALCIUM POLYCARBOPHIL 625 MG 625 MG/1
1 TABLET ORAL DAILY
COMMUNITY
End: 2019-10-16

## 2017-09-02 ASSESSMENT — PAIN SCALES - GENERAL: PAINLEVEL: NO PAIN (0)

## 2017-09-02 NOTE — PATIENT INSTRUCTIONS
Contact Numbers  [facility] Nurse Triage: [phone]  After Hours Nurse Line:  [phone]    Please call the [facility] Triage line if you experience a temperature greater than or equal to 100.5, shaking chills, have uncontrolled nausea, vomiting and/or diarrhea, dizziness, shortness of breath, chest pain, bleeding, unexplained bruising, or if you have any other new/concerning symptoms, questions or concerns.     If it is after hours, weekends, or holidays, please call either the after hours nurse line listed above.    If you are having any concerning symptoms or wish to speak to a provider before your next infusion visit, please call your care coordinator or triage to notify them so we can adequately serve you.     If you need a refill on a narcotic prescription or other medication, please call triage before your infusion appointment.         September 2017 Sunday Monday Tuesday Wednesday Thursday Friday Saturday                            1     2     [facility]    8:00 AM   (15 min.)    [facility]   [facility]     [facility] ONC INFUSION 120    8:30 AM   (120 min.)    ONCOLOGY INFUSION   [facility]   3     4     [facility]    9:00 AM   (15 min.)    [facility] LAB DRAW   [facility] Lab Draw     UMP ONC INFUSION 120    9:30 AM   (120 min.)    ONCOLOGY INFUSION   [facility] 5     6     [facility] DRAW    7:15 AM   (15 min.)    [facility] LAB DRAW   [facility] Lab Draw     UMP RETURN    7:45 AM   (50 min.)   Kalpana Alvarado PA-C   [facility] 7     8     9       10     11     12     13     [facility]    8:00 AM   (15 min.)    [facility] LAB DRAW   [facility] Lab Draw     [facility] ONC INFUSION 360    8:30 AM   (360 min.)    ONCOLOGY INFUSION   [facility]     UMP ONC RETURN   12:00 PM   (30 min.)   Lenore Rodriguez MD   [facility] Blood and Marrow  Transplant 14     15     UMP NEW    9:45 AM   (60 min.)   Pj Cunha MD   [facility] Rheumatology 16       17     18     19     20     21     22     23       24     25     26     27     UMP IMPLANTED DEFIBRILLATOR   12:45 PM   (30 min.)   1, Uc Cv Device   [facility]     UMP RETURN ARRHYTHMIA    1:15 PM   (30 min.)   Juan Eaton MD   [facility] 28     29 30 October 2017 Sunday Monday Tuesday Wednesday Thursday Friday Saturday   1     2     [facility] NEW    9:15 AM   (30 min.)   Archana Green PA-C   [facility] for Prostate and Urologic Cancers 3     4     5     6     7       8     9     10     11     12     13     14       15     16     17     18     19     20     21       22     23     24     25     26     27     28       29     30     31                                      Lab Results:  Recent Results (from the past 12 hour(s))   Blood component    Collection Time: 09/02/17  1:00 AM   Result Value Ref Range    Unit Number [id number]     Blood Component Type PlateletPheresis,LeukoRed Irrad (Part 2)     Division Number 00     Status of Unit Ready for patient 09/02/2017 0716     Blood Product Code [id number]     Unit Status ALEJANDRA    CBC with platelets differential    Collection Time: 09/02/17  8:44 AM   Result Value Ref Range    WBC 3.5 (L) 4.0 - 11.0 10e9/L    RBC Count 2.75 (L) 3.8 - 5.2 10e12/L    Hemoglobin 9.0 (L) 11.7 - 15.7 g/dL    Hematocrit 26.9 (L) 35.0 - 47.0 %    MCV 98 78 - 100 fl    MCH 32.7 26.5 - 33.0 pg    MCHC 33.5 31.5 - 36.5 g/dL    RDW 24.5 (H) 10.0 - 15.0 %    Platelet Count 32 (LL) 150 - 450 10e9/L    Diff Method PENDING

## 2017-09-02 NOTE — PROGRESS NOTES
Infusion Nursing Note:  Amelia Michel presents today for possible Platelets.    Patient seen by provider today: No    Intravenous Access:  Peripheral IV placed.  Labs drawn from PIV.    Treatment Conditions:  Lab Results   Component Value Date    HGB 9.0 09/02/2017     Lab Results   Component Value Date    WBC 3.5 09/02/2017      Lab Results   Component Value Date    ANEU 0.1 08/31/2017     Lab Results   Component Value Date    PLT 32 09/02/2017      Results reviewed, labs did NOT meet treatment parameters: Hgb and Platelet.  Blood transfusion consent signed 8/31/17.  Type and screen drawn for 9/4/17 appointment.      Note: Patient presents to infusion for possible Platelet transfusion. Denied fever, chills, chest pain or shortness of breath. Has noticed some new bruising in the last several days, but no bleeding.    Labs reviewed with patient and .    Post Infusion Assessment:  Site patent and intact, free from redness, edema or discomfort.  No evidence of extravasations.  Access discontinued per protocol.    Discharge Plan:   Patient declined prescription refills.  Discharge instructions reviewed with: Patient and Family.  Patient and/or family verbalized understanding of discharge instructions and all questions answered.  AVS to patient via [employer].  Patient will return 9/4/17 for next appointment.   Patient discharged in stable condition accompanied by: self and .  Departure Mode: Ambulatory with walker.    Betty Giang RN

## 2017-09-02 NOTE — MR AVS SNAPSHOT
After Visit Summary   9/2/2017    Amelia Michel    MRN: [id number]           Patient Information     Date Of Birth          1960        Visit Information        Provider Department      9/2/2017 8:30 AM  12 ATC;  ONCOLOGY INFUSION [facility]        Today's Diagnoses     Diffuse large B-cell lymphoma of extranodal site (H)    -  1      Care Instructions    Contact Numbers  [facility] Nurse Triage: [phone]  After Hours Nurse Line:  [phone]    Please call the [facility] Triage line if you experience a temperature greater than or equal to 100.5, shaking chills, have uncontrolled nausea, vomiting and/or diarrhea, dizziness, shortness of breath, chest pain, bleeding, unexplained bruising, or if you have any other new/concerning symptoms, questions or concerns.     If it is after hours, weekends, or holidays, please call either the after hours nurse line listed above.    If you are having any concerning symptoms or wish to speak to a provider before your next infusion visit, please call your care coordinator or triage to notify them so we can adequately serve you.     If you need a refill on a narcotic prescription or other medication, please call triage before your infusion appointment.         September 2017 Sunday Monday Tuesday Wednesday Thursday Friday Saturday                            1     2     [facility] D[facility]    8:00 AM   (15 min.)    [facility] LAB DRAW   [facility] Draw     UMP ONC INFUSION 120    8:30 AM   (120 min.)    ONCOLOGY INFUSION   [facility]   3     4     [facility] DRAW    9:00 AM   (15 min.)    [facility] LAB DRAW   [facility] Draw     UMP ONC INFUSION 120    9:30 AM   (120 min.)    ONCOLOGY INFUSION   [facility] 5     6     [facility] DRAW    7:15 AM   (15 min.)    [facility] LAB DRAW   [facility] Draw     UMP RETURN    7:45 AM   (50 min.)    Kalpana Alvarado PA-C   [facility] 7     8     9       10     11     12     13     [facility] DRAW    8:00 AM   (15 min.)    [facility] LAB DRAW   [facility] Lab Draw     UMP ONC INFUSION 360    8:30 AM   (360 min.)    ONCOLOGY INFUSION   [facility]     UMP ONC RETURN   12:00 PM   (30 min.)   Lenore Rodriguez MD   [facility] Blood and Marrow Transplant 14     15     UMP NEW    9:45 AM   (60 min.)   Pj Cunha MD   [facility] Rheumatology 16       17     18     19     20     21     22     23       24     25     26     27     UMP IMPLANTED DEFIBRILLATOR   12:45 PM   (30 min.)   1,  Cv Device   [facility]     UMP RETURN ARRHYTHMIA    1:15 PM   (30 min.)   Juan Eaton MD   [facility] 28     29 30 October 2017 Sunday Monday Tuesday Wednesday Thursday Friday Saturday   1     2     UMP NEW    9:15 AM   (30 min.)   Archana Green PA-C   [facility] Urology and Inst for Prostate and Urologic Cancers 3     4     5     6     7       8     9     10     11     12     13     14       15     16     17     18     19     20     21       22     23     24     25     26     27     28       29     30     31                                      Lab Results:  Recent Results (from the past 12 hour(s))   Blood component    Collection Time: 09/02/17  1:00 AM   Result Value Ref Range    Unit Number [id number]     Blood Component Type PlateletPheresis,LeukoRed Irrad (Part 2)     Division Number 00     Status of Unit Ready for patient 09/02/2017 0716     Blood Product Code [id number]     Unit Status ALEJANDRA    CBC with platelets differential    Collection Time: 09/02/17  8:44 AM   Result Value Ref Range    WBC 3.5 (L) 4.0 - 11.0 10e9/L    RBC Count 2.75 (L) 3.8 - 5.2 10e12/L    Hemoglobin 9.0 (L) 11.7 - 15.7 g/dL    Hematocrit 26.9 (L) 35.0 - 47.0 %    MCV 98 78 - 100 fl    MCH 32.7 26.5 - 33.0 pg    MCHC 33.5 31.5 - 36.5 g/dL    RDW 24.5 (H)  10.0 - 15.0 %    Platelet Count 32 (LL) 150 - 450 10e9/L    Diff Method PENDING                Follow-ups after your visit        Your next 10 appointments already scheduled     Sep 04, 2017  9:00 AM CDT   [facility] Draw with [facility] DRAW   [facility] Draw ([facility])    [address] [address]   [phone]            Sep 04, 2017  9:30 AM CDT   Infusion 120 with UC ONCOLOGY INFUSION, UC 10 ATC   [facility] ([facility])    [address] [address]   [phone]            Sep 06, 2017  7:15 AM CDT   [facility] Lab Draw with UC [facility] DRAW   [facility] Draw ([facility])    [address] [place] [address]   [phone]            Sep 06, 2017  8:00 AM CDT   (Arrive by 7:45 AM)   Return Visit with Kalpana Alvarado PA-C   [facility] ([facility])    [address] [place] [address]   [phone]            Sep 13, 2017  8:00 AM CDT   [facility] Lab Draw with UC [facility] LAB DRAW   [facility] Draw ([facility])    [address] [place] [address]   [phone]            Sep 13, 2017  8:30 AM CDT   Infusion 360 with UC ONCOLOGY INFUSION, UC 20 ATC   [facility] ([facility])    [address] [place] [address]   [phone]            Sep 13, 2017 12:00 PM CDT   RETURN ONC with Lenore Rodriguez MD   [facility] Blood and Marrow Transplant ([facility])    [address] [place] [address]   [phone]            Sep 15, 2017 10:00 AM CDT   (Arrive by 9:45 AM)   New Patient Visit with Pj Cunha MD   [facility] Rheumatology ([facility])    [address] [facility]  3rd [facility] [address] [phone]              Future tests that were ordered for you today     Open Standing Orders        Priority Remaining Interval Expires Ordered    Red blood cell prepare order unit Routine 100/100 CONDITIONAL (SPECIFY) BLOOD  9/2/2017    Platelets prepare order unit Routine 100/100 CONDITIONAL (SPECIFY) BLOOD  9/2/2017    Red blood cell prepare order unit Routine 99/100 CONDITIONAL (SPECIFY) BLOOD  9/1/2017    Platelets prepare order unit Routine 99/100 CONDITIONAL (SPECIFY) BLOOD  9/1/2017            Who to contact     If you have questions or need follow up information about today's clinic visit or your schedule please contact [facility] directly at [phone].  Normal or non-critical lab and imaging results will be communicated to you by [employer], letter or phone within 4 business days after the clinic has received the results. If you do not hear from us within 7 days, please contact the clinic through [employer] or phone. If you have a critical or abnormal lab result, we will notify you by phone as soon as possible.  Submit refill requests through [employer] or call your pharmacy and they will forward the refill request to us. Please allow 3 business days for your refill to be completed.          Additional Information About Your Visit        Maintenance Assistant Information     [employer] Assistant gives you secure access to your electronic health record. If you see a primary care provider, you can also send messages to your care team and make appointments. If you have questions, please call your primary care clinic.  If you do not have a primary care provider, please call [phone] and they will assist you.        Care EveryWhere ID     This is your Care EveryWhere ID. This could be used by other organizations to access your [facility] medical records  [id number]        Your Vitals Were     Pulse Temperature Respirations Pulse Oximetry           71 97.7  F (36.5  C) (Oral) 16 98%         Blood Pressure from Last 3 Encounters:   09/02/17 119/72   08/31/17 162/76   08/24/17 133/64    Weight from Last 3 Encounters:   08/31/17 57.4 kg (126 lb 9.6 oz)   08/24/17 55.7 kg (122 lb 11.2 oz)   08/21/17 54.9 kg (121 lb)              We Performed the Following     ABO/Rh type and screen     Blood component     CBC with platelets differential     Platelets prepare order unit        Primary Care Provider Office Phone # Fax #    Comfort Sabillon [phone] [address] [facility] [address]        Equal Access to Services     SARAH HEAD : Hadii laurita rodgerso Sandra, waaxda luemiadaha, qaybta kaalmada jesicayaalana, durga hermosillo . So [facility] [phone].    ATENCIÓN: Si habla español, tiene a sarmiento disposición servicios gratuitos de asistencia lingüística. [facility] [phone].    We comply with applicable federal civil rights laws and [place] laws. We do not discriminate on the basis of race, color, national origin, age, disability sex, sexual orientation or gender identity.            Thank you!     Thank you for choosing [facility]  for your care. Our goal is always to provide you with excellent care. Hearing back from our patients is one way we can continue to improve our services. Please take a few minutes to complete the written survey that you may receive in the mail after your visit with us. Thank you!             Your Updated Medication List - Protect others around you: Learn how to safely use, store and throw away your medicines at www.disposemymeds.org.          This list is accurate as of: 9/2/17 10:46 AM.  Always use your most recent med list.                   Brand Name Dispense Instructions for use Diagnosis    acetaminophen 325 MG tablet    TYLENOL    100 tablet    Take 2 tablets (650 mg) by mouth every 4 hours as needed for mild pain    Rheumatoid arthritis involving multiple  sites with positive rheumatoid factor (H)       acyclovir 400 MG tablet    ZOVIRAX    60 tablet    Take 1 tablet (400 mg) by mouth 2 times daily    Diffuse large B-cell lymphoma, unspecified body region (H)       allopurinol 300 MG tablet    ZYLOPRIM    30 tablet    Take 1 tablet (300 mg) by mouth daily    Diffuse large B-cell lymphoma, unspecified body region (H)       ascorbic acid 500 MG Tabs     30 tablet    Take 1 tablet (500 mg) by mouth daily    Diffuse large B-cell lymphoma, unspecified body region (H)       atenolol 25 MG tablet    TENORMIN    60 tablet    Take 1 tablet (25 mg) by mouth 2 times daily    Atrial tachycardia (H)       calcium polycarbophil 625 MG tablet    FIBERCON     Take 1 tablet by mouth 2 times daily        calcium-vitamin D 600-400 MG-UNIT per tablet    CALTRATE    60 tablet    Take 2 tablets by mouth every morning    Diffuse large B-cell lymphoma, unspecified body region (H)       digoxin 125 MCG tablet    LANOXIN    30 tablet    Take 1 tablet (125 mcg) by mouth daily    Atrial tachycardia (H)       fluconazole 200 MG tablet    DIFLUCAN    30 tablet    Take 1 tablet (200 mg) by mouth daily    Diffuse large B-cell lymphoma, unspecified body region (H)       folic acid 1 MG tablet    FOLVITE    30 tablet    Take 1 tablet (1 mg) by mouth daily    Diffuse large B-cell lymphoma, unspecified body region (H)       gabapentin 100 MG capsule    NEURONTIN    120 capsule    Take 2 capsules (200 mg) by mouth 3 times daily    Critical illness myopathy       lactobacillus rhamnosus (GG) capsule     60 capsule    Take 1 capsule by mouth 2 times daily    Physical deconditioning       levofloxacin 250 MG tablet    LEVAQUIN    30 tablet    Take 1 tablet (250 mg) by mouth daily    Diffuse large B-cell lymphoma, unspecified body region (H)       multivitamin, therapeutic with minerals Tabs tablet     30 each    Take 1 tablet by mouth daily    Diffuse large B-cell lymphoma, unspecified body region (H)        potassium chloride SA 20 MEQ CR tablet    K-DUR/KLOR-CON M    90 tablet    Take 1 tablet (20 mEq) by mouth daily    Diffuse large B-cell lymphoma, unspecified body region (H)       predniSONE 5 MG tablet    DELTASONE    30 tablet    Take 1 tablet (5 mg) by mouth daily    Diffuse large B-cell lymphoma, unspecified body region (H)       thiamine 50 MG Tabs     30 tablet    Take 1 tablet (50 mg) by mouth daily    Diffuse large B-cell lymphoma, unspecified body region (H)

## 2017-09-03 ASSESSMENT — ENCOUNTER SYMPTOMS
SKIN CHANGES: 0
TACHYCARDIA: 1
POOR WOUND HEALING: 0
HYPOTENSION: 0
EXERCISE INTOLERANCE: 0
LEG SWELLING: 0
LEG PAIN: 0
BRUISES/BLEEDS EASILY: 1
NAIL CHANGES: 1
SWOLLEN GLANDS: 0
LIGHT-HEADEDNESS: 0
SYNCOPE: 0
CLAUDICATION: 0
SLEEP DISTURBANCES DUE TO BREATHING: 0
ORTHOPNEA: 0
HYPERTENSION: 0
PALPITATIONS: 1

## 2017-09-04 ENCOUNTER — INFUSION THERAPY VISIT (OUTPATIENT)
Dept: ONCOLOGY | Facility: CLINIC | Age: 57
End: 2017-09-04
Attending: INTERNAL MEDICINE
Payer: COMMERCIAL

## 2017-09-04 ENCOUNTER — APPOINTMENT (OUTPATIENT)
Dept: LAB | Facility: CLINIC | Age: 57
End: 2017-09-04
Attending: INTERNAL MEDICINE
Payer: COMMERCIAL

## 2017-09-04 VITALS
DIASTOLIC BLOOD PRESSURE: 69 MMHG | TEMPERATURE: 98.2 F | OXYGEN SATURATION: 97 % | SYSTOLIC BLOOD PRESSURE: 123 MMHG | RESPIRATION RATE: 16 BRPM | HEART RATE: 71 BPM

## 2017-09-04 DIAGNOSIS — C83.398 DIFFUSE LARGE B-CELL LYMPHOMA OF EXTRANODAL SITE: Primary | ICD-10-CM

## 2017-09-04 LAB
ABO + RH BLD: NORMAL
ABO + RH BLD: NORMAL
BASOPHILS # BLD AUTO: 0 10E9/L (ref 0–0.2)
BASOPHILS NFR BLD AUTO: 0.7 %
BLD GP AB SCN SERPL QL: NORMAL
BLD PROD TYP BPU: NORMAL
BLD PROD TYP BPU: NORMAL
BLD UNIT ID BPU: 0
BLOOD BANK CMNT PATIENT-IMP: NORMAL
BLOOD PRODUCT CODE: NORMAL
BPU ID: NORMAL
DIFFERENTIAL METHOD BLD: ABNORMAL
EOSINOPHIL # BLD AUTO: 0 10E9/L (ref 0–0.7)
EOSINOPHIL NFR BLD AUTO: 0 %
ERYTHROCYTE [DISTWIDTH] IN BLOOD BY AUTOMATED COUNT: 24.9 % (ref 10–15)
HCT VFR BLD AUTO: 26 % (ref 35–47)
HGB BLD-MCNC: 8.6 G/DL (ref 11.7–15.7)
IMM GRANULOCYTES # BLD: 0.1 10E9/L (ref 0–0.4)
IMM GRANULOCYTES NFR BLD: 1.5 %
LYMPHOCYTES # BLD AUTO: 0.5 10E9/L (ref 0.8–5.3)
LYMPHOCYTES NFR BLD AUTO: 10.7 %
MCH RBC QN AUTO: 33.1 PG (ref 26.5–33)
MCHC RBC AUTO-ENTMCNC: 33.1 G/DL (ref 31.5–36.5)
MCV RBC AUTO: 100 FL (ref 78–100)
MONOCYTES # BLD AUTO: 0.7 10E9/L (ref 0–1.3)
MONOCYTES NFR BLD AUTO: 14.7 %
NEUTROPHILS # BLD AUTO: 3.3 10E9/L (ref 1.6–8.3)
NEUTROPHILS NFR BLD AUTO: 72.4 %
NRBC # BLD AUTO: 0 10*3/UL
NRBC BLD AUTO-RTO: 0 /100
NUM BPU REQUESTED: 1
PLATELET # BLD AUTO: 38 10E9/L (ref 150–450)
RBC # BLD AUTO: 2.6 10E12/L (ref 3.8–5.2)
SPECIMEN EXP DATE BLD: NORMAL
TRANSFUSION STATUS PATIENT QL: NORMAL
TRANSFUSION STATUS PATIENT QL: NORMAL
WBC # BLD AUTO: 4.6 10E9/L (ref 4–11)

## 2017-09-04 PROCEDURE — 85025 COMPLETE CBC W/AUTO DIFF WBC: CPT | Performed by: INTERNAL MEDICINE

## 2017-09-04 PROCEDURE — 36592 COLLECT BLOOD FROM PICC: CPT

## 2017-09-04 PROCEDURE — 86900 BLOOD TYPING SEROLOGIC ABO: CPT | Performed by: INTERNAL MEDICINE

## 2017-09-04 PROCEDURE — 86901 BLOOD TYPING SEROLOGIC RH(D): CPT | Performed by: INTERNAL MEDICINE

## 2017-09-04 PROCEDURE — 86850 RBC ANTIBODY SCREEN: CPT | Performed by: INTERNAL MEDICINE

## 2017-09-04 ASSESSMENT — PAIN SCALES - GENERAL: PAINLEVEL: MILD PAIN (2)

## 2017-09-04 NOTE — PROGRESS NOTES
"Infusion Nursing Note:  Amelia Michel presents today for Possible transfusion (patient DID NOT MEET parameters).    Patient seen by provider today: No    Intravenous Access:  Peripheral IV placed.    Treatment Conditions:  Lab Results   Component Value Date    HGB 8.6 09/04/2017     Lab Results   Component Value Date    WBC 4.6 09/04/2017      Lab Results   Component Value Date    ANEU 2.3 09/02/2017     Lab Results   Component Value Date    PLT 38 09/04/2017      Patient DID NOT MEET transfusion parameters today.  Denies any s/s of anemia or bleeding today.    Note: Patient states she is \"feeling pretty good.\"  Offers no new concerns at this time.  Patient denies any dizziness/lightheadedness, shortness of breath, bleeding, or increased bruising/petechia.  Instructed patient to call triage or go to the ED if she develops any s/s of anemia or bleeding.  Patient verbalized understanding.    Post Infusion Assessment:  Access discontinued per protocol.    Discharge Plan:   Patient declined prescription refills.  Discharge instructions reviewed with: Patient and Family.  Patient and/or family verbalized understanding of discharge instructions and all questions answered.  AVS to patient via [employer].  Patient will return 9/6/17 for next appointment.   Patient discharged in stable condition accompanied by: .  Departure Mode: walker.  Face to Face time: 2 minutes.    JERAD MAC RN        "

## 2017-09-04 NOTE — MR AVS SNAPSHOT
After Visit Summary   9/4/2017    Amelia Michel    MRN: [id number]           Patient Information     Date Of Birth          1960        Visit Information        Provider Department      9/4/2017 9:30 AM  10 ATC;  ONCOLOGY INFUSION [facility]        Today's Diagnoses     Diffuse large B-cell lymphoma of extranodal site (H)    -  1      Care Instructions    Contact Numbers  [facility] Nurse Triage: [phone]  After Hours Nurse Line:  [phone]    Please call the [facility] Nurse Triage line or after hours number if you experience a temperature greater than or equal to 100.5, shaking chills, have uncontrolled nausea, vomiting and/or diarrhea, dizziness, shortness of breath, chest pain, bleeding, unexplained bruising, or if you have any other new/concerning symptoms, questions or concerns.     If you are having any concerning symptoms or wish to speak to a provider before your next infusion visit, please call your care coordinator or triage to notify them so we can adequately serve you.     If you need a refill on a narcotic prescription or other medication, please call triage before your infusion appointment.           September 2017 Sunday Monday Tuesday Wednesday Thursday Friday Saturday                            1     2     [facility]    8:00 AM   (15 min.)    [facility]     [facility] ONC INFUSION 120    8:30 AM   (120 min.)    ONCOLOGY INFUSION   [facility]   3     4     [facility]RAW    9:00 AM   (15 min.)    [facility]   [facility]     [facility] ONC INFUSION 120    9:30 AM   (120 min.)    ONCOLOGY INFUSION   [facility] 5     6     [facility] DRAW    7:15 AM   (15 min.)    [facility]   [facility]     [facility] RETURN    7:45 AM   (50 min.)   Kalpana Alvarado PA-C   [facility] 7     8     9        10     11     12     13     [facility] DRAW    8:00 AM   (15 min.)    M[facility]IC LAB DRAW   [facility] Masonic Lab Draw     UMP ONC INFUSION 360    8:30 AM   (360 min.)   UC ONCOLOGY INFUSION   [facility]     UMP ONC RETURN   12:00 PM   (30 min.)   Lenore Rodriguez MD   [facility] Blood and Marrow Transplant 14     15     UMP NEW    9:45 AM   (60 min.)   Pj Cunha MD   [facility] Rheumatology 16       17     18     19     20     21     22     23       24     25     26     27     UMP IMPLANTED DEFIBRILLATOR   12:45 PM   (30 min.)   1, Uc Cv Device   [facility]     UMP RETURN ARRHYTHMIA    1:15 PM   (30 min.)   Juan Eaton MD   [facility] 28 29 30 October 2017 Sunday Monday Tuesday Wednesday Thursday Friday Saturday   1     2     UMP NEW    9:15 AM   (30 min.)   Archana Green PA-C   [facility] Urology and Inst for Prostate and Urologic Cancers 3     4     5     6     7       8     9     10     11     12     13     14       15     16     17     18     19     20     21       22     23     24     25     26     27     28       29     30     31                                     Recent Results (from the past 24 hour(s))   CBC with platelets differential    Collection Time: 09/04/17  9:30 AM   Result Value Ref Range    WBC 4.6 4.0 - 11.0 10e9/L    RBC Count 2.60 (L) 3.8 - 5.2 10e12/L    Hemoglobin 8.6 (L) 11.7 - 15.7 g/dL    Hematocrit 26.0 (L) 35.0 - 47.0 %     78 - 100 fl    MCH 33.1 (H) 26.5 - 33.0 pg    MCHC 33.1 31.5 - 36.5 g/dL    RDW 24.9 (H) 10.0 - 15.0 %    Platelet Count 38 (LL) 150 - 450 10e9/L    Diff Method PENDING                  Follow-ups after your visit        Your next 10 appointments already scheduled     Sep 06, 2017  7:15 AM [facility]   [facility] Lab Draw with  [facility] LAB DRAW   [facility] Lab Draw ([facility])    [address] [address] [phone]             Sep 06, 2017  8:00 AM CDT   (Arrive by 7:45 AM)   Return Visit with Kalpana Alvarado PA-C   [facility] ([facility])    [address]  [employer] [id number] [phone]            Sep 13, 2017  8:00 AM CDT   [facility] Lab Draw with [facility] LAB DRAW   [facility]    [address]  [employer] [employer] [id number] [phone]            Sep 13, 2017  8:30 AM CDT   Infusion 360 with UC ONCOLOGY INFUSION, UC 20 ATC   [facility] ([facility])    [address]  [employer] [employer] [id number] [phone]            Sep 13, 2017 12:00 PM CDT   RETURN ONC with Lenore Rodriguez MD   [facility] Blood and Marrow Transplant ([facility])    [address] [place] [address]  [address] [employer] [id number] [phone]            Sep 15, 2017 10:00 AM CDT   (Arrive by 9:45 AM)   New Patient Visit with Pj Cunha MD   [facility] Rheumatology ([facility])    [address] [place] [address]  [employer] [employer] [id number] [phone]            Sep 27, 2017  1:00 PM CDT   (Arrive by 12:45 PM)   Implanted Defibulator with Uc Cv Device 1   [facility] ([facility])    [address] [place] [address]  [employer] [employer] [id number] [phone]            Sep 27, 2017  1:30 PM CDT   (Arrive by 1:15 PM)   RETURN ARRHYTHMIA with Juan Eaton MD   [facility] ([facility])    [address] [place] [address]  [employer] [employer] [id number] [phone]            Oct 02, 2017  9:30 AM CDT   (Arrive by 9:15 AM)   New Patient Visit with Archana Green PA-C   [facility] Inst for Prostate and Urologic Cancers ([facility])    [address] [place] [address]  [employer] [employer] [id number] [phone]               Future tests that were ordered for you today     Open Standing Orders        Priority Remaining Interval Expires Ordered    Red blood cell prepare order unit Routine 99/100 CONDITIONAL (SPECIFY) BLOOD  9/3/2017    Platelets prepare order unit Routine 99/100 CONDITIONAL (SPECIFY) BLOOD  9/3/2017            Who to contact     If you have questions or need follow up information about today's clinic visit or your schedule please contact [facility] directly at [phone].  Normal or non-critical lab and imaging results will be communicated to you by [employer], letter or phone within 4 business days after the clinic has received the results. If you do not hear from us within 7 days, please contact the clinic through [employer] or phone. If you have a critical or abnormal lab result, we will notify you by phone as soon as possible.  Submit refill requests through [employer] or call your pharmacy and they will forward the refill request to us. Please allow 3 business days for your refill to be completed.          Additional Information About Your Visit        [employer] Information     [employer] gives you secure access to your electronic health record. If you see a primary care provider, you can also send messages to your care team and make appointments. If you have questions, please call your primary care clinic.  If you do not have a primary care provider, please call [phone] and they will assist you.        Care EveryWhere ID     This is your Care EveryWhere ID. This could be used by other organizations to access your [place] medical records  [id number]        Your Vitals Were     Pulse Temperature Respirations Pulse Oximetry          71 98.2  F (36.8  C) (Oral) 16 97%         Blood Pressure from Last 3 Encounters:   09/04/17 123/69   09/02/17 119/72   08/31/17 162/76    Weight from Last 3 Encounters:   08/31/17 57.4 kg (126 lb 9.6 oz)   08/24/17 55.7 kg (122 lb 11.2 oz)   08/21/17 54.9 kg  (121 lb)              We Performed the Following     ABO/Rh type and screen     Blood component     CBC with platelets differential     Platelets prepare order unit        Primary Care Provider Office Phone # Fax #    Comfort Sabillon [phone] [address] [place] [address]        Equal Access to Services     [facility] : Hadii aad ku hadasho Soomaali, waaxda luqadaha, qaybta kaalmada adeegyada, waxay idiin hayfloydn adeja louise laGeraldnegin . So [facility] [phone].    ATENCIÓN: Si habla español, tiene a sarmiento disposición servicios gratuitos de asistencia lingüística. [facility] [phone].    We comply with applicable federal civil rights laws and [place] laws. We do not discriminate on the basis of race, color, national origin, age, disability sex, sexual orientation or gender identity.            Thank you!     Thank you for choosing [facility]  for your care. Our goal is always to provide you with excellent care. Hearing back from our patients is one way we can continue to improve our services. Please take a few minutes to complete the written survey that you may receive in the mail after your visit with us. Thank you!             Your Updated Medication List - Protect others around you: Learn how to safely use, store and throw away your medicines at www.disposemymeds.org.          This list is accurate as of: 9/4/17 10:10 AM.  Always use your most recent med list.                   Brand Name Dispense Instructions for use Diagnosis    acetaminophen 325 MG tablet    TYLENOL    100 tablet    Take 2 tablets (650 mg) by mouth every 4 hours as needed for mild pain    Rheumatoid arthritis involving multiple sites with positive rheumatoid factor (H)       acyclovir 400 MG tablet    ZOVIRAX    60 tablet    Take 1 tablet (400 mg) by mouth 2 times daily    Diffuse large B-cell lymphoma, unspecified body region (H)       allopurinol 300 MG tablet    ZYLOPRIM    30 tablet     Take 1 tablet (300 mg) by mouth daily    Diffuse large B-cell lymphoma, unspecified body region (H)       ascorbic acid 500 MG Tabs     30 tablet    Take 1 tablet (500 mg) by mouth daily    Diffuse large B-cell lymphoma, unspecified body region (H)       atenolol 25 MG tablet    TENORMIN    60 tablet    Take 1 tablet (25 mg) by mouth 2 times daily    Atrial tachycardia (H)       calcium polycarbophil 625 MG tablet    FIBERCON     Take 1 tablet by mouth 2 times daily        calcium-vitamin D 600-400 MG-UNIT per tablet    CALTRATE    60 tablet    Take 2 tablets by mouth every morning    Diffuse large B-cell lymphoma, unspecified body region (H)       digoxin 125 MCG tablet    LANOXIN    30 tablet    Take 1 tablet (125 mcg) by mouth daily    Atrial tachycardia (H)       fluconazole 200 MG tablet    DIFLUCAN    30 tablet    Take 1 tablet (200 mg) by mouth daily    Diffuse large B-cell lymphoma, unspecified body region (H)       folic acid 1 MG tablet    FOLVITE    30 tablet    Take 1 tablet (1 mg) by mouth daily    Diffuse large B-cell lymphoma, unspecified body region (H)       gabapentin 100 MG capsule    NEURONTIN    120 capsule    Take 2 capsules (200 mg) by mouth 3 times daily    Critical illness myopathy       lactobacillus rhamnosus (GG) capsule     60 capsule    Take 1 capsule by mouth 2 times daily    Physical deconditioning       levofloxacin 250 MG tablet    LEVAQUIN    30 tablet    Take 1 tablet (250 mg) by mouth daily    Diffuse large B-cell lymphoma, unspecified body region (H)       multivitamin, therapeutic with minerals Tabs tablet     30 each    Take 1 tablet by mouth daily    Diffuse large B-cell lymphoma, unspecified body region (H)       potassium chloride SA 20 MEQ CR tablet    K-DUR/KLOR-CON M    90 tablet    Take 1 tablet (20 mEq) by mouth daily    Diffuse large B-cell lymphoma, unspecified body region (H)       predniSONE 5 MG tablet    DELTASONE    30 tablet    Take 1 tablet (5 mg) by mouth  daily    Diffuse large B-cell lymphoma, unspecified body region (H)       thiamine 50 MG Tabs     30 tablet    Take 1 tablet (50 mg) by mouth daily    Diffuse large B-cell lymphoma, unspecified body region (H)

## 2017-09-04 NOTE — PATIENT INSTRUCTIONS
Contact Numbers  [facility] Nurse Triage: [phone]  After Hours Nurse Line:  [phone]    Please call the [facility] Nurse Triage line or after hours number if you experience a temperature greater than or equal to 100.5, shaking chills, have uncontrolled nausea, vomiting and/or diarrhea, dizziness, shortness of breath, chest pain, bleeding, unexplained bruising, or if you have any other new/concerning symptoms, questions or concerns.     If you are having any concerning symptoms or wish to speak to a provider before your next infusion visit, please call your care coordinator or triage to notify them so we can adequately serve you.     If you need a refill on a narcotic prescription or other medication, please call triage before your infusion appointment.           September 2017 Sunday Monday Tuesday Wednesday Thursday Friday Saturday                            1     2     [facility] DRAW    8:00 AM   (15 min.)    [facility]     [facility] ONC INFUSION 120    8:30 AM   (120 min.)    ONCOLOGY INFUSION   [facility]   3     4     [facility] LAB DRAW    9:00 AM   (15 min.)    [facility]RAW   [facility]     [facility] ONC INFUSION 120    9:30 AM   (120 min.)    ONCOLOGY INFUSION   [facility] 5     6     [facility] LAB DRAW    7:15 AM   (15 min.)    MASONIC LAB DRAW   [facility] Lab Draw     UMP RETURN    7:45 AM   (50 min.)   Kalpana Alvarado PA-C   [facility] 7     8     9       10     11     12     13     [facility] LAB DRAW    8:00 AM   (15 min.)    M[facility]ONIC LAB DRAW   [facility] Lab Draw     UMP ONC INFUSION 360    8:30 AM   (360 min.)    ONCOLOGY INFUSION   [facility]     UMP ONC RETURN   12:00 PM   (30 min.)   Lenore Rodriguez MD   [facility] Blood and Marrow Transplant 14     15     UMP NEW    9:45 AM   (60 min.)   Pj Cunha MD   [facility]  Rheumatology 16       17     18     19     20     21     22     23       24     25     26     27     UMP IMPLANTED DEFIBRILLATOR   12:45 PM   (30 min.)   1, Uc Cv Device   [facility]     UMP RETURN ARRHYTHMIA    1:15 PM   (30 min.)   Juan Eaton MD   [facility] 28 29 30 October 2017 Sunday Monday Tuesday Wednesday Thursday Friday Saturday   1     2     UMP NEW    9:15 AM   (30 min.)   Archana Green PA-C   [facility] for Prostate and Urologic Cancers 3     4     5     6     7       8     9     10     11     12     13     14       15     16     17     18     19     20     21       22     23     24     25     26     27     28       29     30     31                                     Recent Results (from the past 24 hour(s))   CBC with platelets differential    Collection Time: 09/04/17  9:30 AM   Result Value Ref Range    WBC 4.6 4.0 - 11.0 10e9/L    RBC Count 2.60 (L) 3.8 - 5.2 10e12/L    Hemoglobin 8.6 (L) 11.7 - 15.7 g/dL    Hematocrit 26.0 (L) 35.0 - 47.0 %     78 - 100 fl    MCH 33.1 (H) 26.5 - 33.0 pg    MCHC 33.1 31.5 - 36.5 g/dL    RDW 24.9 (H) 10.0 - 15.0 %    Platelet Count 38 (LL) 150 - 450 10e9/L    Diff Method PENDING

## 2017-09-06 ENCOUNTER — APPOINTMENT (OUTPATIENT)
Dept: LAB | Facility: CLINIC | Age: 57
End: 2017-09-06
Attending: INTERNAL MEDICINE
Payer: COMMERCIAL

## 2017-09-06 ENCOUNTER — ONCOLOGY VISIT (OUTPATIENT)
Dept: ONCOLOGY | Facility: CLINIC | Age: 57
End: 2017-09-06
Attending: PHYSICIAN ASSISTANT
Payer: COMMERCIAL

## 2017-09-06 VITALS
DIASTOLIC BLOOD PRESSURE: 68 MMHG | SYSTOLIC BLOOD PRESSURE: 118 MMHG | TEMPERATURE: 98 F | BODY MASS INDEX: 25.06 KG/M2 | HEART RATE: 60 BPM | RESPIRATION RATE: 16 BRPM | OXYGEN SATURATION: 98 % | WEIGHT: 119.9 LBS

## 2017-09-06 DIAGNOSIS — C83.398 DIFFUSE LARGE B-CELL LYMPHOMA OF EXTRANODAL SITE: Primary | ICD-10-CM

## 2017-09-06 LAB
ALBUMIN SERPL-MCNC: 3.4 G/DL (ref 3.4–5)
ALP SERPL-CCNC: 119 U/L (ref 40–150)
ALT SERPL W P-5'-P-CCNC: 67 U/L (ref 0–50)
ANION GAP SERPL CALCULATED.3IONS-SCNC: 5 MMOL/L (ref 3–14)
AST SERPL W P-5'-P-CCNC: 35 U/L (ref 0–45)
BASOPHILS # BLD AUTO: 0 10E9/L (ref 0–0.2)
BASOPHILS NFR BLD AUTO: 0.5 %
BILIRUB SERPL-MCNC: 0.5 MG/DL (ref 0.2–1.3)
BLD PROD TYP BPU: NORMAL
BLD UNIT ID BPU: 0
BLOOD PRODUCT CODE: NORMAL
BPU ID: NORMAL
BUN SERPL-MCNC: 17 MG/DL (ref 7–30)
CALCIUM SERPL-MCNC: 9.2 MG/DL (ref 8.5–10.1)
CHLORIDE SERPL-SCNC: 106 MMOL/L (ref 94–109)
CO2 SERPL-SCNC: 30 MMOL/L (ref 20–32)
CREAT SERPL-MCNC: 0.63 MG/DL (ref 0.52–1.04)
DIFFERENTIAL METHOD BLD: ABNORMAL
EOSINOPHIL # BLD AUTO: 0 10E9/L (ref 0–0.7)
EOSINOPHIL NFR BLD AUTO: 0 %
ERYTHROCYTE [DISTWIDTH] IN BLOOD BY AUTOMATED COUNT: 25.9 % (ref 10–15)
GFR SERPL CREATININE-BSD FRML MDRD: >90 ML/MIN/1.7M2
GLUCOSE SERPL-MCNC: 108 MG/DL (ref 70–99)
HCT VFR BLD AUTO: 29.6 % (ref 35–47)
HGB BLD-MCNC: 9.6 G/DL (ref 11.7–15.7)
IMM GRANULOCYTES # BLD: 0.1 10E9/L (ref 0–0.4)
IMM GRANULOCYTES NFR BLD: 1.3 %
LDH SERPL L TO P-CCNC: 205 U/L (ref 81–234)
LYMPHOCYTES # BLD AUTO: 0.5 10E9/L (ref 0.8–5.3)
LYMPHOCYTES NFR BLD AUTO: 8.8 %
MCH RBC QN AUTO: 33.1 PG (ref 26.5–33)
MCHC RBC AUTO-ENTMCNC: 32.4 G/DL (ref 31.5–36.5)
MCV RBC AUTO: 102 FL (ref 78–100)
MONOCYTES # BLD AUTO: 0.7 10E9/L (ref 0–1.3)
MONOCYTES NFR BLD AUTO: 12 %
NEUTROPHILS # BLD AUTO: 4.8 10E9/L (ref 1.6–8.3)
NEUTROPHILS NFR BLD AUTO: 77.4 %
NRBC # BLD AUTO: 0 10*3/UL
NRBC BLD AUTO-RTO: 1 /100
PLATELET # BLD AUTO: 64 10E9/L (ref 150–450)
POTASSIUM SERPL-SCNC: 3.9 MMOL/L (ref 3.4–5.3)
PROT SERPL-MCNC: 6.8 G/DL (ref 6.8–8.8)
RBC # BLD AUTO: 2.9 10E12/L (ref 3.8–5.2)
SODIUM SERPL-SCNC: 140 MMOL/L (ref 133–144)
TRANSFUSION STATUS PATIENT QL: NORMAL
TRANSFUSION STATUS PATIENT QL: NORMAL
WBC # BLD AUTO: 6.2 10E9/L (ref 4–11)

## 2017-09-06 PROCEDURE — 85025 COMPLETE CBC W/AUTO DIFF WBC: CPT | Performed by: PHYSICIAN ASSISTANT

## 2017-09-06 PROCEDURE — 80053 COMPREHEN METABOLIC PANEL: CPT | Performed by: PHYSICIAN ASSISTANT

## 2017-09-06 PROCEDURE — 36415 COLL VENOUS BLD VENIPUNCTURE: CPT

## 2017-09-06 PROCEDURE — 99212 OFFICE O/P EST SF 10 MIN: CPT

## 2017-09-06 PROCEDURE — 99214 OFFICE O/P EST MOD 30 MIN: CPT | Mod: ZP | Performed by: PHYSICIAN ASSISTANT

## 2017-09-06 PROCEDURE — 83615 LACTATE (LD) (LDH) ENZYME: CPT | Performed by: PHYSICIAN ASSISTANT

## 2017-09-06 ASSESSMENT — PAIN SCALES - GENERAL: PAINLEVEL: NO PAIN (0)

## 2017-09-06 NOTE — PROGRESS NOTES
"[facility]  FOLLOW-UP VISIT NOTE  Date of visit: 9-6-17          REASON FOR VISIT: DLBCL, here for assessment prior to admission for HD MTX    Disease History:  1. Complicated patient with history of Rheumatoid Arthritis status post long term treatment with methotrexate with recent significant complicated course with cardiac arrest, admission with hypotension, sepsis, ICU stay and intubation with subsequent additional readmission from TCU in 6/2017 for FUO with extensive work-up with eventual finding of progression cytopenias with eventual lymphoma diagnosis  2. Bone Marrow Biopsy: 7/12/2017: Highly suspicious for involvement by B cell lymphoma with foci of large atypical CD20+ cells  3. PET CT 7/19/2017: Extensive activity: Right paratracheal lesion with SUV 28, many liver lesions with SUV 15, cardiac lesion intraarterial septum, numerous bone lesions.  4. 7/21 Liver Biopsy: Consistent with Diffuse Large B Cell Lymphoma non-germinal center phenotype  -- FISH for myc, bcl2, bcl6 negative for double-hit lymphoma  5. IPI based on Age < 60 (0 points) + PS 2 (1 + point) + Stage IV Disease (1 point) + Extranodal disease > 1 site (1 point) + elevated LDH (1 point) = 4 Poor Risk Disease  6. Cycle 1 given as R-EPOCH Day 1 = 7/27/2017 8/23/17- C2 of chemo switched to R-CHOP    INTERVAL HISTORY: Amelia is here today with her SO - she tolerated her R-CHOP well- no real nausea/vomiting. She thinks it was \"easier\" than her R-EPOCH- less fatiguing and better to be at home. She feels her appetite is great, gained 6 lbs initially from the prednisone and now has lost some of the water weight.   She really has been feeling quite a bit better with time, appetite improving and strength better.  She continues to use her wheeled walker when ambulating, but is able to go without it for short distances. Her sleep isn't great- but this is a chronic issue.  She has had chronic issues with bladder " incontinence prior to chemo- however the chemo, fluids, etc made it worse when she was inpatient.  Since being at home, she has been working on good bladder training- urinating every 2 hours, restricting fluids at night -and her control has been better.    No recent f/s/c.  No cough/dyspnea.  BM daily, no blood.  No edema.  Wound on left anti cubital fossa slowly healing.     EXAM:  /68 (BP Location: Right arm, Cuff Size: Adult Small)  Pulse 60  Temp 98  F (36.7  C) (Oral)  Resp 16  Wt 54.4 kg (119 lb 14.4 oz)  SpO2 98%  BMI 25.06 kg/m2  Wt Readings from Last 4 Encounters:   09/06/17 54.4 kg (119 lb 14.4 oz)   08/31/17 57.4 kg (126 lb 9.6 oz)   08/24/17 55.7 kg (122 lb 11.2 oz)   08/21/17 54.9 kg (121 lb)     Vital signs were reviewed.   Patient alert and oriented x3.   PERRLA. EOMI. No scleral icterus noted. OP without thrush.  Sore on lower gum frenulum.   Neck exam: No palpable cervical, supraclavicular or axillary nodes bilaterally.   Heart: chest scar. Systolic murmur  Lungs: clear to auscultation bilaterally.  No crackles or wheezing.   Abd: positive bowel sounds in all four quadrants.  No tenderness to palpation.  No hepatomegaly.   Extremities: No lower extremity edema. Has compression socks on  Neuro: grossly intact.   Mood and affect is stable  Left arm shows dressing with no signs of infection on arm,  showed me daily picture, shows two wounds are slowly healing    LABS:      9/6/2017 07:44   Sodium 140   Potassium 3.9   Chloride 106   Carbon Dioxide 30   Urea Nitrogen 17   Creatinine 0.63   GFR Estimate >90   GFR Estimate If Black >90   Calcium 9.2   Anion Gap 5   Albumin 3.4   Protein Total 6.8   Bilirubin Total 0.5   Alkaline Phosphatase 119   ALT 67 (H)   AST 35   Lactate Dehydrogenase 205   Glucose 108 (H)   WBC 6.2   Hemoglobin 9.6 (L)   Hematocrit 29.6 (L)   Platelet Count 64 (L)   RBC Count 2.90 (L)    (H)   MCH 33.1 (H)   MCHC 32.4   RDW 25.9 (H)   Diff Method Automated  Method   % Neutrophils 77.4   % Lymphocytes 8.8   % Monocytes 12.0   % Eosinophils 0.0   % Basophils 0.5   % Immature Granulocytes 1.3   Nucleated RBCs 1 (H)   Absolute Neutrophil 4.8     ASSESSMENT/PLAN: Amelia is a 56 year old female with h/o RA and new diagnosis of stage IVB DLBCL with high risk.  Presented with cardiac arrest and significant cytopenias.     ONC: stage IVB, high risk.  Treated with R-EPOCH but will move forward with R-CHOP (for five more cycles).  She will receive HD MTX inpatient on days 15 of cycle 2, 4 and 6 due to high IPI.  Today her counts are showing great recovery from chemo (and presented with pancytopenia) and clinically she is getting stronger.  She is medically fit to proceed with HD MTX tomorrow.  Her platelets ideally we would like them over 75k.  Today she is 64k and doubled since Monday, thus she should be in the 70k range tomorrow.  -cont allopurinol  -Dr Rodriguez and C2 R-CHOP (c3 total) 9/13    HEME: presented with pancytopenia, worsened with chemo and now recovering.  No current need for transfusion support.     ID: no currently s/s of infection.  Completed rocephin for strep mitis 8/23.  - -cont levaquin 250 mg  -cont ppx flucon + ACV    CV: no current chest pain, a fib.  She feels occasional afib, but rate controlled.  ECHO on 8/31/17- EF of 55-60%, does have mitral and tricuspid insufficiency.  Murmur noted on exam.   -Cont dig and atenolol.  Sees cardiology end of September  - anticoagulation ? At this point she isn't consistently >50k but when she does, we'll consider getting her on coumadin    : h/o saddle anesthesia and urinary incontinence at night mostly, wears depends.  Will need urology and urodynamic studies outpatient- sees next month.  She is working on bladder training at home and seeing some improvement, will be challenging with inpatient chemo due to IVF.    LFT elevation: likely related to liver involvement.  Continues to trend down.     H/o RA: 30 + years  of RA, long history of therapy with MTX, pred and HCQ.  Continue 5 mg pred daily.  9/15 f/up with Dr Alphonse FRANCIS extravasation:  doing dressing changes.  Not assessed today, but he showed me the daily pictures on phone.  Still extensive wound with tendon exposure, but clearly healing since I saw last. No signs of infection.     Rosalinda Alvarado PA-C

## 2017-09-06 NOTE — NURSING NOTE
Chief Complaint   Patient presents with     Blood Draw     Labs collected from venipuncture. Vitals taken. Paged provider for lab orders. Pt checked in for appt     Labs collected from venipuncture by RN. Vitals taken. Paged provider for lab orders. Checked in for appointment(s).    Yumiko Swartz RN

## 2017-09-06 NOTE — MR AVS SNAPSHOT
After Visit Summary   9/6/2017    Amelia Michel    MRN: [id number]           Patient Information     Date Of Birth          1960        Visit Information        Provider Department      9/6/2017 8:00 AM Kalpana Alvarado PA-C [facility]        Today's Diagnoses     Diffuse large B-cell lymphoma of extranodal site (H)    -  1       Follow-ups after your visit        Your next 10 appointments already scheduled     Sep 13, 2017  8:00 AM CDT   [facility] Draw with  [facility] DRAW   [facility] Lab Draw ([facility])    [address] [place] [id number]   [phone]            Sep 13, 2017  8:30 AM CDT   Infusion 360 with UC ONCOLOGY INFUSION, UC 20 ATC   [facility] ([facility])    [address] [place] [address]   [phone]            Sep 13, 2017 12:00 PM CDT   RETURN ONC with Lenore Rodriguez MD   [facility] Blood and Marrow Transplant ([facility])    [address] [place] [address]   [phone]            Sep 15, 2017 10:00 AM CDT   (Arrive by 9:45 AM)   New Patient Visit with Pj Cunha MD   [facility] Rheumatology ([facility])    [address] [place] [id number] [phone]            Sep 27, 2017  1:00 PM CDT   (Arrive by 12:45 PM)   Implanted Defibulator with Uc Cv Device 1   [facility] ([facility])    [address] [place] [id number] [phone]            Sep 27, 2017  1:30 PM CDT   (Arrive by 1:15 PM)   RETURN ARRHYTHMIA with Juan Eaton MD   [facility] ([facility])    [address] [place] [id number] [phone]            Oct 02, 2017  9:30 AM CDT   (Arrive by 9:15 AM)   New Patient Visit with  Archana Green PA-C   [facility] ([facility])    [address] [facility]  [address] [facility] [place] [address] [phone]              Who to contact     If you have questions or need follow up information about today's clinic visit or your schedule please contact [facility] directly at [phone].  Normal or non-critical lab and imaging results will be communicated to you by [employer], letter or phone within 4 business days after the clinic has received the results. If you do not hear from us within 7 days, please contact the clinic through [employer] or phone. If you have a critical or abnormal lab result, we will notify you by phone as soon as possible.  Submit refill requests through [email or web address] or call your pharmacy and they will forward the refill request to us. Please allow 3 business days for your refill to be completed.          Additional Information About Your Visit        [email or web address] Information     [email or web address] gives you secure access to your electronic health record. If you see a primary care provider, you can also send messages to your care team and make appointments. If you have questions, please call your primary care clinic.  If you do not have a primary care provider, please call [phone] and they will assist you.        Care EveryWhere ID     This is your Care EveryWhere ID. This could be used by other organizations to access your [facility] medical records  [id number]        Your Vitals Were     Pulse Temperature Respirations Pulse Oximetry BMI (Body Mass Index)       60 98  F (36.7  C) (Oral) 16 98% 25.06 kg/m2        Blood Pressure from Last 3 Encounters:   09/06/17 118/68   09/04/17 123/69   09/02/17 119/72    Weight from Last 3 Encounters:   09/06/17 54.4 kg (119 lb 14.4 oz)   08/31/17 57.4 kg (126 lb 9.6 oz)   08/24/17 55.7 kg (122 lb 11.2 oz)              We Performed the Following     CBC with  platelets differential     Comprehensive metabolic panel     Lactate Dehydrogenase        Primary Care Provider Office Phone # Fax #    Comfort Sabillon [phone] [address]  [place] [address]        Equal Access to Services     MANISHASARAH SONIDO : Kay shay ku ana mariao Sooliviaali, waaxda luqadaha, qaybta kaalmada adeegyada, durga mosqueranegin henny. So [facility] [phone].    ATENCIÓN: Si habla español, tiene a sarmiento disposición servicios gratuitos de asistencia lingüística. Llame al [phone].    We comply with applicable federal civil rights laws and [place] laws. We do not discriminate on the basis of race, color, national origin, age, disability sex, sexual orientation or gender identity.            Thank you!     Thank you for choosing [facility]  for your care. Our goal is always to provide you with excellent care. Hearing back from our patients is one way we can continue to improve our services. Please take a few minutes to complete the written survey that you may receive in the mail after your visit with us. Thank you!             Your Updated Medication List - Protect others around you: Learn how to safely use, store and throw away your medicines at www.disposemymeds.org.          This list is accurate as of: 9/6/17  8:53 PM.  Always use your most recent med list.                   Brand Name Dispense Instructions for use Diagnosis    acyclovir 400 MG tablet    ZOVIRAX    60 tablet    Take 1 tablet (400 mg) by mouth 2 times daily    Diffuse large B-cell lymphoma, unspecified body region (H)       allopurinol 300 MG tablet    ZYLOPRIM    30 tablet    Take 1 tablet (300 mg) by mouth daily    Diffuse large B-cell lymphoma, unspecified body region (H)       ascorbic acid 500 MG Tabs     30 tablet    Take 1 tablet (500 mg) by mouth daily    Diffuse large B-cell lymphoma, unspecified body region (H)       atenolol 25 MG tablet    TENORMIN    60 tablet     Take 1 tablet (25 mg) by mouth 2 times daily    Atrial tachycardia (H)       calcium polycarbophil 625 MG tablet    FIBERCON     Take 1 tablet by mouth 2 times daily        calcium-vitamin D 600-400 MG-UNIT per tablet    CALTRATE    60 tablet    Take 2 tablets by mouth every morning    Diffuse large B-cell lymphoma, unspecified body region (H)       digoxin 125 MCG tablet    LANOXIN    30 tablet    Take 1 tablet (125 mcg) by mouth daily    Atrial tachycardia (H)       fluconazole 200 MG tablet    DIFLUCAN    30 tablet    Take 1 tablet (200 mg) by mouth daily    Diffuse large B-cell lymphoma, unspecified body region (H)       gabapentin 100 MG capsule    NEURONTIN    120 capsule    Take 2 capsules (200 mg) by mouth 3 times daily    Critical illness myopathy       lactobacillus rhamnosus (GG) capsule     60 capsule    Take 1 capsule by mouth 2 times daily    Physical deconditioning       levofloxacin 250 MG tablet    LEVAQUIN    30 tablet    Take 1 tablet (250 mg) by mouth daily    Diffuse large B-cell lymphoma, unspecified body region (H)       multivitamin, therapeutic with minerals Tabs tablet     30 each    Take 1 tablet by mouth daily    Diffuse large B-cell lymphoma, unspecified body region (H)       potassium chloride SA 20 MEQ CR tablet    K-DUR/KLOR-CON M    90 tablet    Take 1 tablet (20 mEq) by mouth daily    Diffuse large B-cell lymphoma, unspecified body region (H)       predniSONE 5 MG tablet    DELTASONE    30 tablet    Take 1 tablet (5 mg) by mouth daily    Diffuse large B-cell lymphoma, unspecified body region (H)       thiamine 50 MG Tabs     30 tablet    Take 1 tablet (50 mg) by mouth daily    Diffuse large B-cell lymphoma, unspecified body region (H)

## 2017-09-06 NOTE — NURSING NOTE
"Oncology Rooming Note    September 6, 2017 8:13 AM   Amelia Michel is a 56 year old female who presents for:    Chief Complaint   Patient presents with     Blood Draw     Labs collected from venipuncture. Vitals taken. Paged provider for lab orders. Pt checked in for appt     Oncology Clinic Visit     Return for Lymphoma      Initial Vitals: /68 (BP Location: Right arm, Cuff Size: Adult Small)  Pulse 60  Temp 98  F (36.7  C) (Oral)  Resp 16  Wt 54.4 kg (119 lb 14.4 oz)  SpO2 98%  BMI 25.06 kg/m2 Estimated body mass index is 25.06 kg/(m^2) as calculated from the following:    Height as of 8/12/17: 1.473 m (4' 10\").    Weight as of this encounter: 54.4 kg (119 lb 14.4 oz). Body surface area is 1.49 meters squared.  No Pain (0) Comment: Data Unavailable   No LMP recorded. Patient is postmenopausal.  Allergies reviewed: Yes  Medications reviewed: Yes    Medications: Medication refills not needed today.  Pharmacy name entered into [facility]:    [facility] [place], [place] - [address] [facility] [place], [place] - [address] [place] [address] PHARMACY [facility] [place], [place] - [address] [facility] HOME INFUSION    Clinical concerns: no concerns  Christiano was notified.    6 minutes for nursing intake (face to face time)     Tammi Santos MA              "

## 2017-09-07 ENCOUNTER — HOSPITAL ENCOUNTER (INPATIENT)
Dept: VASCULAR ULTRASOUND | Facility: CLINIC | Age: 57
DRG: 847 | End: 2017-09-07
Attending: PHYSICIAN ASSISTANT | Admitting: INTERNAL MEDICINE
Payer: COMMERCIAL

## 2017-09-07 ENCOUNTER — HOSPITAL ENCOUNTER (INPATIENT)
Facility: CLINIC | Age: 57
LOS: 4 days | Discharge: HOME HEALTH | DRG: 847 | End: 2017-09-11
Attending: INTERNAL MEDICINE | Admitting: INTERNAL MEDICINE
Payer: COMMERCIAL

## 2017-09-07 DIAGNOSIS — E87.79 OTHER HYPERVOLEMIA: ICD-10-CM

## 2017-09-07 DIAGNOSIS — C83.30 DIFFUSE LARGE B-CELL LYMPHOMA, UNSPECIFIED BODY REGION (H): ICD-10-CM

## 2017-09-07 DIAGNOSIS — C83.398 DIFFUSE LARGE B-CELL LYMPHOMA OF EXTRANODAL SITE: ICD-10-CM

## 2017-09-07 DIAGNOSIS — E83.39 HYPOPHOSPHATEMIA: ICD-10-CM

## 2017-09-07 DIAGNOSIS — I82.621 ACUTE DEEP VEIN THROMBOSIS (DVT) OF RIGHT UPPER EXTREMITY, UNSPECIFIED VEIN (H): Primary | ICD-10-CM

## 2017-09-07 LAB
ALBUMIN SERPL-MCNC: 3.1 G/DL (ref 3.4–5)
ALP SERPL-CCNC: 105 U/L (ref 40–150)
ALT SERPL W P-5'-P-CCNC: 58 U/L (ref 0–50)
ANION GAP SERPL CALCULATED.3IONS-SCNC: 6 MMOL/L (ref 3–14)
AST SERPL W P-5'-P-CCNC: 30 U/L (ref 0–45)
BASOPHILS # BLD AUTO: 0 10E9/L (ref 0–0.2)
BASOPHILS NFR BLD AUTO: 0.3 %
BILIRUB DIRECT SERPL-MCNC: 0.1 MG/DL (ref 0–0.2)
BILIRUB SERPL-MCNC: 0.4 MG/DL (ref 0.2–1.3)
BUN SERPL-MCNC: 22 MG/DL (ref 7–30)
CALCIUM SERPL-MCNC: 8.8 MG/DL (ref 8.5–10.1)
CHLORIDE SERPL-SCNC: 108 MMOL/L (ref 94–109)
CO2 SERPL-SCNC: 28 MMOL/L (ref 20–32)
CREAT SERPL-MCNC: 0.72 MG/DL (ref 0.52–1.04)
DIFFERENTIAL METHOD BLD: ABNORMAL
EOSINOPHIL # BLD AUTO: 0 10E9/L (ref 0–0.7)
EOSINOPHIL NFR BLD AUTO: 0.2 %
ERYTHROCYTE [DISTWIDTH] IN BLOOD BY AUTOMATED COUNT: 26.4 % (ref 10–15)
GFR SERPL CREATININE-BSD FRML MDRD: 83 ML/MIN/1.7M2
GLUCOSE SERPL-MCNC: 110 MG/DL (ref 70–99)
HCT VFR BLD AUTO: 28.4 % (ref 35–47)
HGB BLD-MCNC: 9.1 G/DL (ref 11.7–15.7)
IMM GRANULOCYTES # BLD: 0.1 10E9/L (ref 0–0.4)
IMM GRANULOCYTES NFR BLD: 0.9 %
LYMPHOCYTES # BLD AUTO: 0.5 10E9/L (ref 0.8–5.3)
LYMPHOCYTES NFR BLD AUTO: 7 %
MCH RBC QN AUTO: 33 PG (ref 26.5–33)
MCHC RBC AUTO-ENTMCNC: 32 G/DL (ref 31.5–36.5)
MCV RBC AUTO: 103 FL (ref 78–100)
MONOCYTES # BLD AUTO: 0.7 10E9/L (ref 0–1.3)
MONOCYTES NFR BLD AUTO: 10.5 %
NEUTROPHILS # BLD AUTO: 5.3 10E9/L (ref 1.6–8.3)
NEUTROPHILS NFR BLD AUTO: 81.1 %
NRBC # BLD AUTO: 0 10*3/UL
NRBC BLD AUTO-RTO: 0 /100
PH UR STRIP: 8 PH (ref 5–7)
PLATELET # BLD AUTO: 70 10E9/L (ref 150–450)
POTASSIUM SERPL-SCNC: 3.8 MMOL/L (ref 3.4–5.3)
PROT SERPL-MCNC: 6 G/DL (ref 6.8–8.8)
RBC # BLD AUTO: 2.76 10E12/L (ref 3.8–5.2)
SODIUM SERPL-SCNC: 141 MMOL/L (ref 133–144)
WBC # BLD AUTO: 6.5 10E9/L (ref 4–11)

## 2017-09-07 PROCEDURE — 80048 BASIC METABOLIC PNL TOTAL CA: CPT | Performed by: PHYSICIAN ASSISTANT

## 2017-09-07 PROCEDURE — 3E0430M INTRODUCTION OF MONOCLONAL ANTIBODY INTO CENTRAL VEIN, PERCUTANEOUS APPROACH: ICD-10-PCS | Performed by: INTERNAL MEDICINE

## 2017-09-07 PROCEDURE — 81003 URINALYSIS AUTO W/O SCOPE: CPT | Performed by: INTERNAL MEDICINE

## 2017-09-07 PROCEDURE — 80076 HEPATIC FUNCTION PANEL: CPT | Performed by: PHYSICIAN ASSISTANT

## 2017-09-07 PROCEDURE — 27210208 ZZH KIT VALVED DOUBLE LUMEN

## 2017-09-07 PROCEDURE — 85025 COMPLETE CBC W/AUTO DIFF WBC: CPT | Performed by: PHYSICIAN ASSISTANT

## 2017-09-07 PROCEDURE — 25000132 ZZH RX MED GY IP 250 OP 250 PS 637: Performed by: PHYSICIAN ASSISTANT

## 2017-09-07 PROCEDURE — 25000132 ZZH RX MED GY IP 250 OP 250 PS 637: Performed by: INTERNAL MEDICINE

## 2017-09-07 PROCEDURE — 40000558 ZZH STATISTIC CVC DRESSING CHANGE

## 2017-09-07 PROCEDURE — 25000125 ZZHC RX 250: Performed by: INTERNAL MEDICINE

## 2017-09-07 PROCEDURE — 36415 COLL VENOUS BLD VENIPUNCTURE: CPT | Performed by: PHYSICIAN ASSISTANT

## 2017-09-07 PROCEDURE — 36569 INSJ PICC 5 YR+ W/O IMAGING: CPT

## 2017-09-07 PROCEDURE — 25000128 H RX IP 250 OP 636: Performed by: INTERNAL MEDICINE

## 2017-09-07 PROCEDURE — 25000125 ZZHC RX 250: Performed by: PHYSICIAN ASSISTANT

## 2017-09-07 PROCEDURE — 99223 1ST HOSP IP/OBS HIGH 75: CPT | Mod: AI | Performed by: INTERNAL MEDICINE

## 2017-09-07 PROCEDURE — 99212 OFFICE O/P EST SF 10 MIN: CPT

## 2017-09-07 PROCEDURE — S5010 5% DEXTROSE AND 0.45% SALINE: HCPCS | Performed by: INTERNAL MEDICINE

## 2017-09-07 PROCEDURE — 25800025 ZZH RX 258: Performed by: INTERNAL MEDICINE

## 2017-09-07 PROCEDURE — 25000131 ZZH RX MED GY IP 250 OP 636 PS 637: Performed by: INTERNAL MEDICINE

## 2017-09-07 PROCEDURE — 12000008 ZZH R&B INTERMEDIATE UMMC

## 2017-09-07 RX ORDER — ALLOPURINOL 300 MG/1
300 TABLET ORAL DAILY
Status: DISCONTINUED | OUTPATIENT
Start: 2017-09-08 | End: 2017-09-11 | Stop reason: HOSPADM

## 2017-09-07 RX ORDER — CALCIUM POLYCARBOPHIL 625 MG 625 MG/1
625 TABLET ORAL 2 TIMES DAILY
Status: DISCONTINUED | OUTPATIENT
Start: 2017-09-07 | End: 2017-09-08

## 2017-09-07 RX ORDER — POTASSIUM CHLORIDE 1.5 G/1.58G
20-40 POWDER, FOR SOLUTION ORAL
Status: DISCONTINUED | OUTPATIENT
Start: 2017-09-07 | End: 2017-09-09

## 2017-09-07 RX ORDER — ACETAMINOPHEN 325 MG/1
650 TABLET ORAL EVERY 4 HOURS PRN
Status: DISCONTINUED | OUTPATIENT
Start: 2017-09-07 | End: 2017-09-11 | Stop reason: HOSPADM

## 2017-09-07 RX ORDER — LORAZEPAM 0.5 MG/1
.5-1 TABLET ORAL EVERY 6 HOURS PRN
Status: DISCONTINUED | OUTPATIENT
Start: 2017-09-07 | End: 2017-09-11 | Stop reason: HOSPADM

## 2017-09-07 RX ORDER — LORAZEPAM 2 MG/ML
.5-1 INJECTION INTRAMUSCULAR EVERY 6 HOURS PRN
Status: DISCONTINUED | OUTPATIENT
Start: 2017-09-07 | End: 2017-09-07

## 2017-09-07 RX ORDER — MULTIPLE VITAMINS W/ MINERALS TAB 9MG-400MCG
1 TAB ORAL DAILY
Status: DISCONTINUED | OUTPATIENT
Start: 2017-09-08 | End: 2017-09-11 | Stop reason: HOSPADM

## 2017-09-07 RX ORDER — ONDANSETRON 2 MG/ML
8 INJECTION INTRAMUSCULAR; INTRAVENOUS EVERY 8 HOURS PRN
Status: DISCONTINUED | OUTPATIENT
Start: 2017-09-07 | End: 2017-09-11 | Stop reason: HOSPADM

## 2017-09-07 RX ORDER — SODIUM CHLORIDE 9 MG/ML
1000 INJECTION, SOLUTION INTRAVENOUS CONTINUOUS PRN
Status: DISCONTINUED | OUTPATIENT
Start: 2017-09-07 | End: 2017-09-09

## 2017-09-07 RX ORDER — METHYLPREDNISOLONE SODIUM SUCCINATE 125 MG/2ML
125 INJECTION, POWDER, LYOPHILIZED, FOR SOLUTION INTRAMUSCULAR; INTRAVENOUS
Status: DISCONTINUED | OUTPATIENT
Start: 2017-09-07 | End: 2017-09-09

## 2017-09-07 RX ORDER — DEXAMETHASONE 4 MG/1
8 TABLET ORAL DAILY
Status: COMPLETED | OUTPATIENT
Start: 2017-09-08 | End: 2017-09-09

## 2017-09-07 RX ORDER — EPINEPHRINE 1 MG/ML
0.3 INJECTION INTRAMUSCULAR; INTRAVENOUS; SUBCUTANEOUS EVERY 5 MIN PRN
Status: DISCONTINUED | OUTPATIENT
Start: 2017-09-07 | End: 2017-09-09

## 2017-09-07 RX ORDER — ATENOLOL 25 MG/1
25 TABLET ORAL 2 TIMES DAILY
Status: DISCONTINUED | OUTPATIENT
Start: 2017-09-07 | End: 2017-09-11 | Stop reason: HOSPADM

## 2017-09-07 RX ORDER — ALBUTEROL SULFATE 0.83 MG/ML
2.5 SOLUTION RESPIRATORY (INHALATION)
Status: DISCONTINUED | OUTPATIENT
Start: 2017-09-07 | End: 2017-09-09

## 2017-09-07 RX ORDER — ONDANSETRON 8 MG/1
16 TABLET, FILM COATED ORAL ONCE
Status: COMPLETED | OUTPATIENT
Start: 2017-09-07 | End: 2017-09-07

## 2017-09-07 RX ORDER — GABAPENTIN 100 MG/1
200 CAPSULE ORAL 3 TIMES DAILY
Status: DISCONTINUED | OUTPATIENT
Start: 2017-09-07 | End: 2017-09-11 | Stop reason: HOSPADM

## 2017-09-07 RX ORDER — DEXAMETHASONE 4 MG/1
12 TABLET ORAL ONCE
Status: COMPLETED | OUTPATIENT
Start: 2017-09-07 | End: 2017-09-07

## 2017-09-07 RX ORDER — ASCORBIC ACID 500 MG
500 TABLET ORAL DAILY
Status: DISCONTINUED | OUTPATIENT
Start: 2017-09-08 | End: 2017-09-11 | Stop reason: HOSPADM

## 2017-09-07 RX ORDER — PREDNISONE 5 MG/1
5 TABLET ORAL DAILY
Status: DISCONTINUED | OUTPATIENT
Start: 2017-09-08 | End: 2017-09-11 | Stop reason: HOSPADM

## 2017-09-07 RX ORDER — NALOXONE HYDROCHLORIDE 0.4 MG/ML
.1-.4 INJECTION, SOLUTION INTRAMUSCULAR; INTRAVENOUS; SUBCUTANEOUS
Status: DISCONTINUED | OUTPATIENT
Start: 2017-09-07 | End: 2017-09-11 | Stop reason: HOSPADM

## 2017-09-07 RX ORDER — LEUCOVORIN CALCIUM 350 MG/17.5ML
50 INJECTION, POWDER, LYOPHILIZED, FOR SOLUTION INTRAMUSCULAR; INTRAVENOUS ONCE
Status: COMPLETED | OUTPATIENT
Start: 2017-09-08 | End: 2017-09-08

## 2017-09-07 RX ORDER — PROCHLORPERAZINE MALEATE 10 MG
10 TABLET ORAL EVERY 6 HOURS PRN
Status: DISCONTINUED | OUTPATIENT
Start: 2017-09-07 | End: 2017-09-11 | Stop reason: HOSPADM

## 2017-09-07 RX ORDER — MAGNESIUM SULFATE HEPTAHYDRATE 40 MG/ML
4 INJECTION, SOLUTION INTRAVENOUS EVERY 4 HOURS PRN
Status: DISCONTINUED | OUTPATIENT
Start: 2017-09-07 | End: 2017-09-11 | Stop reason: HOSPADM

## 2017-09-07 RX ORDER — AMOXICILLIN 250 MG
1-2 CAPSULE ORAL 2 TIMES DAILY
Status: DISCONTINUED | OUTPATIENT
Start: 2017-09-07 | End: 2017-09-07

## 2017-09-07 RX ORDER — SODIUM BICARBONATE 650 MG/1
TABLET ORAL
Status: DISCONTINUED | OUTPATIENT
Start: 2017-09-07 | End: 2017-09-11

## 2017-09-07 RX ORDER — ACYCLOVIR 400 MG/1
400 TABLET ORAL 2 TIMES DAILY
Status: DISCONTINUED | OUTPATIENT
Start: 2017-09-07 | End: 2017-09-11 | Stop reason: HOSPADM

## 2017-09-07 RX ORDER — DIGOXIN 125 MCG
125 TABLET ORAL DAILY
Status: DISCONTINUED | OUTPATIENT
Start: 2017-09-08 | End: 2017-09-11 | Stop reason: HOSPADM

## 2017-09-07 RX ORDER — AMOXICILLIN 250 MG
1-2 CAPSULE ORAL 2 TIMES DAILY PRN
Status: DISCONTINUED | OUTPATIENT
Start: 2017-09-07 | End: 2017-09-11 | Stop reason: HOSPADM

## 2017-09-07 RX ORDER — PROCHLORPERAZINE MALEATE 5 MG
5-10 TABLET ORAL EVERY 6 HOURS PRN
Status: DISCONTINUED | OUTPATIENT
Start: 2017-09-07 | End: 2017-09-07

## 2017-09-07 RX ORDER — POTASSIUM CHLORIDE 29.8 MG/ML
20 INJECTION INTRAVENOUS
Status: DISCONTINUED | OUTPATIENT
Start: 2017-09-07 | End: 2017-09-07 | Stop reason: RX

## 2017-09-07 RX ORDER — POLYETHYLENE GLYCOL 3350 17 G/17G
17 POWDER, FOR SOLUTION ORAL DAILY PRN
Status: DISCONTINUED | OUTPATIENT
Start: 2017-09-07 | End: 2017-09-11 | Stop reason: HOSPADM

## 2017-09-07 RX ORDER — POTASSIUM CHLORIDE 7.45 MG/ML
10 INJECTION INTRAVENOUS
Status: DISCONTINUED | OUTPATIENT
Start: 2017-09-07 | End: 2017-09-09

## 2017-09-07 RX ORDER — ONDANSETRON 8 MG/1
8 TABLET, ORALLY DISINTEGRATING ORAL EVERY 8 HOURS PRN
Status: DISCONTINUED | OUTPATIENT
Start: 2017-09-07 | End: 2017-09-11 | Stop reason: HOSPADM

## 2017-09-07 RX ORDER — LORAZEPAM 0.5 MG/1
.5-1 TABLET ORAL EVERY 6 HOURS PRN
Status: DISCONTINUED | OUTPATIENT
Start: 2017-09-07 | End: 2017-09-07

## 2017-09-07 RX ORDER — HEPARIN SODIUM,PORCINE 10 UNIT/ML
2-5 VIAL (ML) INTRAVENOUS
Status: DISCONTINUED | OUTPATIENT
Start: 2017-09-07 | End: 2017-09-11 | Stop reason: HOSPADM

## 2017-09-07 RX ORDER — LEUCOVORIN CALCIUM 350 MG/17.5ML
25 INJECTION, POWDER, LYOPHILIZED, FOR SOLUTION INTRAMUSCULAR; INTRAVENOUS EVERY 6 HOURS
Status: DISCONTINUED | OUTPATIENT
Start: 2017-09-09 | End: 2017-09-11

## 2017-09-07 RX ORDER — MEPERIDINE HYDROCHLORIDE 25 MG/ML
25 INJECTION INTRAMUSCULAR; INTRAVENOUS; SUBCUTANEOUS EVERY 30 MIN PRN
Status: DISCONTINUED | OUTPATIENT
Start: 2017-09-07 | End: 2017-09-09

## 2017-09-07 RX ORDER — POTASSIUM CL/LIDO/0.9 % NACL 10MEQ/0.1L
10 INTRAVENOUS SOLUTION, PIGGYBACK (ML) INTRAVENOUS
Status: DISCONTINUED | OUTPATIENT
Start: 2017-09-07 | End: 2017-09-09

## 2017-09-07 RX ORDER — ALBUTEROL SULFATE 90 UG/1
1-2 AEROSOL, METERED RESPIRATORY (INHALATION)
Status: DISCONTINUED | OUTPATIENT
Start: 2017-09-07 | End: 2017-09-09

## 2017-09-07 RX ORDER — DIPHENHYDRAMINE HYDROCHLORIDE 50 MG/ML
50 INJECTION INTRAMUSCULAR; INTRAVENOUS
Status: DISCONTINUED | OUTPATIENT
Start: 2017-09-07 | End: 2017-09-09

## 2017-09-07 RX ORDER — POTASSIUM CHLORIDE 750 MG/1
20-40 TABLET, EXTENDED RELEASE ORAL
Status: DISCONTINUED | OUTPATIENT
Start: 2017-09-07 | End: 2017-09-09

## 2017-09-07 RX ORDER — LIDOCAINE 40 MG/G
CREAM TOPICAL
Status: DISCONTINUED | OUTPATIENT
Start: 2017-09-07 | End: 2017-09-11 | Stop reason: HOSPADM

## 2017-09-07 RX ORDER — SODIUM CHLORIDE 9 MG/ML
INJECTION, SOLUTION INTRAVENOUS CONTINUOUS
Status: DISCONTINUED | OUTPATIENT
Start: 2017-09-08 | End: 2017-09-10

## 2017-09-07 RX ORDER — SODIUM BICARBONATE 650 MG/1
3900 TABLET ORAL EVERY 4 HOURS
Status: DISCONTINUED | OUTPATIENT
Start: 2017-09-07 | End: 2017-09-11

## 2017-09-07 RX ORDER — LORAZEPAM 2 MG/ML
.5-1 INJECTION INTRAMUSCULAR EVERY 6 HOURS PRN
Status: DISCONTINUED | OUTPATIENT
Start: 2017-09-07 | End: 2017-09-11

## 2017-09-07 RX ORDER — POTASSIUM CHLORIDE 750 MG/1
20 TABLET, EXTENDED RELEASE ORAL DAILY
Status: DISCONTINUED | OUTPATIENT
Start: 2017-09-08 | End: 2017-09-11 | Stop reason: HOSPADM

## 2017-09-07 RX ADMIN — METHOTREXATE 5 G: 25 INJECTION, SOLUTION INTRA-ARTERIAL; INTRAMUSCULAR; INTRATHECAL; INTRAVENOUS at 20:31

## 2017-09-07 RX ADMIN — ATENOLOL 25 MG: 25 TABLET ORAL at 19:47

## 2017-09-07 RX ADMIN — LIDOCAINE HYDROCHLORIDE 3 ML: 10 INJECTION, SOLUTION INFILTRATION; PERINEURAL at 14:29

## 2017-09-07 RX ADMIN — ACYCLOVIR 400 MG: 400 TABLET ORAL at 19:47

## 2017-09-07 RX ADMIN — GABAPENTIN 200 MG: 100 CAPSULE ORAL at 13:46

## 2017-09-07 RX ADMIN — SODIUM BICARBONATE 650 MG TABLET 3900 MG: at 21:23

## 2017-09-07 RX ADMIN — DEXAMETHASONE 12 MG: 4 TABLET ORAL at 19:48

## 2017-09-07 RX ADMIN — CALCIUM POLYCARBOPHIL 625 MG: 625 TABLET, FILM COATED ORAL at 20:43

## 2017-09-07 RX ADMIN — Medication: at 19:47

## 2017-09-07 RX ADMIN — SODIUM BICARBONATE 650 MG TABLET 3900 MG: at 17:33

## 2017-09-07 RX ADMIN — SODIUM BICARBONATE 650 MG TABLET 3900 MG: at 13:46

## 2017-09-07 RX ADMIN — ONDANSETRON HYDROCHLORIDE 16 MG: 8 TABLET, FILM COATED ORAL at 19:47

## 2017-09-07 RX ADMIN — RANITIDINE HYDROCHLORIDE 150 MG: 150 TABLET, FILM COATED ORAL at 19:47

## 2017-09-07 RX ADMIN — Medication: at 15:35

## 2017-09-07 RX ADMIN — GABAPENTIN 200 MG: 100 CAPSULE ORAL at 19:47

## 2017-09-07 NOTE — IP AVS SNAPSHOT
MRN:[id number]                      After Visit Summary   9/7/2017    Amelia Michel    MRN: [id number]           Thank you!     Thank you for choosing [facility] for your care. Our goal is always to provide you with excellent care. Hearing back from our patients is one way we can continue to improve our services. Please take a few minutes to complete the written survey that you may receive in the mail after you visit with us. Thank you!        Patient Information     Date Of Birth          1960        Designated Caregiver       Most Recent Value    Caregiver    Will someone help with your care after discharge? yes    Name of designated caregiver Wolf Michel    Phone number of caregiver [phone]    Caregiver address same      About your hospital stay     You were admitted on:  September 7, 2017 You last received care in the:  Unit [address]D [facility]    You were discharged on:  September 11, 2017        Reason for your hospital stay       You were admitted for cycle 2 day 15 high dose methotrexate.                  Who to Call     For medical emergencies, please call 911.  For non-urgent questions about your medical care, please call your primary care provider or clinic, [phone]          Attending Provider     Provider Specialty    Nany Chadwick MD Hematology       Primary Care Provider Office Phone # Fax #    Comfort Sabillon [phone]       When to contact your care team       Call the [facility] 24-hour triage line at [phone] for temp >100.4, uncontrolled nausea/vomiting/diarrhea/constipation, unrelieved pain, bleeding not relieved with pressure, dizziness, chest pain, shortness of breath, loss of consciousness, and any new or concerning symptoms.     Call [phone] and ask for the care coordinator that works with your oncologist if you have questions about scans, appointments, hospital follow-up, or other concerns.                  After Care  Instructions     Activity       Your activity upon discharge: Activity as tolerated. No driving or strenuous activity while taking narcotics, if having headaches/dizziness/vision changes, or if feeling generally weak or unwell.            Diet       Follow this diet upon discharge: Regular diet as tolerated. Be sure to drink plenty of non-caffeinated fluids.                  Follow-up Appointments     Follow Up and recommended labs and tests       Labs and appointment with Dr. Rodriguez on Wednesday as discussed in the hospital and listed below.                  Your next 10 appointments already scheduled     Sep 13, 2017 11:15 AM CDT   [facility] Lab Draw with  [facility] LAB DRAW   [facility] Masonic Lab Draw ([facility])    [address] [place] [address]  [address] [facility] [address]   [phone]            Sep 13, 2017 12:00 PM CDT   RETURN ONC with Lenore Rodriguez MD   [facility] Blood and Marrow Transplant ([facility])    [address] [place] [address]  [address] [facility] [address]   [phone]            Sep 15, 2017 10:00 AM CDT   (Arrive by 9:45 AM)   New Patient Visit with Pj Cunha MD   [facility] Rheumatology ([facility])    [address] [place] [address]  3rd Floor  [facility] [address]   [phone]            Sep 20, 2017  8:30 AM CDT   [facility] Lab Draw with [facility] DRAW   [facility] Masonic Lab Draw ([facility])    [address] [place] [address]  [address] [facility] [address]   [phone]            Sep 20, 2017  9:00 AM CDT   Infusion 360 with  ONCOLOGY INFUSION, UC 26 ATC   [facility] ([facility])    [address] [place] [address]  [address] [facility] [address]   [phone]            Sep 27, 2017  1:00 PM CDT   (Arrive by 12:45 PM)   Implanted Defibulator with  Cv Device 1   [facility] Heart Care ([facility])    [address] [place] [address]  Se  3rd [facility] [address]   [phone]            Sep 27, 2017  1:30 PM CDT   (Arrive by 1:15 PM)   RETURN ARRHYTHMIA with Juan Eaton MD   [facility] Heart [facility] ([facility])    [address] [place] [address]  3rd [facility] [address]   [phone]            Oct 02, 2017  9:30 AM CDT   (Arrive by 9:15 AM)   New Patient Visit with Archana Green PA-C   [facility] for Prostate and Urologic Cancers ([facility])    [address] [place] [address]  4th [facility] [address]   [phone]              Additional Services     Home care nursing referral       [facility]  Phone  [phone]  Fax  [phone]    RN skilled nursing visit. RN to assess vital signs and weight, respiratory and cardiac status, pain level and activity tolerance, incision for signs/symptoms of infection and home safety.  RN to assist with wound care. WOC RN to see as needed.  RN to teach medication management.    Patient discharging with PICC. Home RN does not need to assist with cares. Per patient preference, she will have done in clinic. Thank you.    Your provider has ordered home care nursing services. If you have not been contacted within 2 days of your discharge please call the inpatient department phone number at [phone] .                  Future tests that were ordered for you     Basic metabolic panel           CBC with platelets differential       Last Lab Result: Hemoglobin (g/dL)       Date                     Value                 09/11/2017               7.9 (L)          ----------            Phosphorus                 Further instructions from your care team       Mouth Rinse = 1 tsp salt + 1 tsp baking soda to 1 quart water        Contact Numbers    [facility] Main Line: [phone]  [facility] Triage:  [phone]    Call triage with chills and/or temperature greater than or equal to 100.4, uncontrolled nausea/vomiting, diarrhea,  "constipation, dizziness, shortness of breath, chest pain, bleeding, unexplained bruising, or any new/concerning symptoms, questions/concerns.         If you are having any concerning symptoms or wish to speak to a provider before your next infusion visit, please call your care coordinator or triage to notify them so we can adequately serve you.           Pending Results     Date and Time Order Name Status Description    9/10/2017 1103 Echocardiogram Complete In process     9/10/2017 1055 EKG 12-lead, complete Preliminary     9/7/2017 1043 IR PICC Vascular In process             Statement of Approval     Ordered          09/11/17 1223  I have reviewed and agree with all the recommendations and orders detailed in this document.  EFFECTIVE NOW     Approved and electronically signed by:  Sara Jo APRN CNP             Admission Information     Date & Time Provider Department Dept. Phone    9/7/2017 Nany Chadwick MD Unit 7D [facility] [phone]      Your Vitals Were     Blood Pressure Pulse Temperature Respirations Height Weight    170/65 62 97.1  F (36.2  C) (Oral) 18 1.473 m (4' 10\") 61.5 kg (135 lb 9.6 oz)    Pulse Oximetry BMI (Body Mass Index)                98% 28.34 kg/m2          MyChart Information     [facility] gives you secure access to your electronic health record. If you see a primary care provider, you can also send messages to your care team and make appointments. If you have questions, please call your primary care clinic.  If you do not have a primary care provider, please call [phone] and they will assist you.        Care EveryWhere ID     This is your Care EveryWhere ID. This could be used by other organizations to access your [facility] medical records  [id number]        Equal Access to Services     [facility] SONIDO : Kay Flores, khari ibarra, durga santizo. So [facility] [phone].    ATENCIÓN: Si marlee gutierrez, " tiene a sarmiento disposición servicios gratuitos de asistencia lingüística. Taylor hooker [phone].    We comply with applicable federal civil rights laws and [place] laws. We do not discriminate on the basis of race, color, national origin, age, disability sex, sexual orientation or gender identity.               Review of your medicines      START taking        Dose / Directions    acetaminophen 325 MG tablet   Commonly known as:  TYLENOL   Used for:  Diffuse large B-cell lymphoma of extranodal site (H)        Dose:  650 mg   Take 2 tablets (650 mg) by mouth every 4 hours as needed for mild pain, fever or headaches   Refills:  0       enoxaparin 60 MG/0.6ML injection   Commonly known as:  LOVENOX   Used for:  Acute deep vein thrombosis (DVT) of right upper extremity, unspecified vein (H)        Dose:  60 mg   Inject 0.6 mLs (60 mg) Subcutaneous every 12 hours for 30 days   Quantity:  60 Syringe   Refills:  1       furosemide 20 MG tablet   Commonly known as:  LASIX   Used for:  Other hypervolemia        Dose:  20 mg   Take 1 tablet (20 mg) by mouth daily for 5 days   Quantity:  5 tablet   Refills:  1       leucovorin 15 MG Tabs   Used for:  Diffuse large B-cell lymphoma of extranodal site (H)        Dose:  15 mg   Take 1 tablet (15 mg) by mouth every 6 hours for 3 days   Quantity:  12 tablet   Refills:  1       ondansetron 8 MG ODT tab   Commonly known as:  ZOFRAN-ODT   Used for:  Diffuse large B-cell lymphoma of extranodal site (H)        Dose:  8 mg   Take 1 tablet (8 mg) by mouth every 8 hours as needed   Quantity:  20 tablet   Refills:  2       potassium & sodium phosphates 280-160-250 MG Packet   Commonly known as:  NEUTRA-PHOS   Used for:  Hypophosphatemia        Dose:  1 packet   Take 1 packet by mouth 2 times daily   Quantity:  60 packet   Refills:  1       traMADol 50 MG tablet   Commonly known as:  ULTRAM   Used for:  Diffuse large B-cell lymphoma of extranodal site (H)        Dose:  50 mg   Take 1 tablet (50  mg) by mouth every 6 hours as needed for moderate pain   Quantity:  30 tablet   Refills:  0         CONTINUE these medicines which may have CHANGED, or have new prescriptions. If we are uncertain of the size of tablets/capsules you have at home, strength may be listed as something that might have changed.        Dose / Directions    potassium chloride SA 20 MEQ CR tablet   Commonly known as:  K-DUR/KLOR-CON M   This may have changed:    - how much to take  - additional instructions   Used for:  Diffuse large B-cell lymphoma, unspecified body region (H)        Dose:  40 mEq   Take 2 tablets (40 mEq) by mouth daily while taking furosemide (Lasix). You can decrease potassium to 20 mEq daily once off Lasix.   Quantity:  60 tablet   Refills:  1         CONTINUE these medicines which have NOT CHANGED        Dose / Directions    acyclovir 400 MG tablet   Commonly known as:  ZOVIRAX   Indication:  Treatment to Prevent Cytomegalovirus Disease   Used for:  Diffuse large B-cell lymphoma, unspecified body region (H)        Dose:  400 mg   Take 1 tablet (400 mg) by mouth 2 times daily   Quantity:  60 tablet   Refills:  0       allopurinol 300 MG tablet   Commonly known as:  ZYLOPRIM   Used for:  Diffuse large B-cell lymphoma, unspecified body region (H)        Dose:  300 mg   Take 1 tablet (300 mg) by mouth daily   Quantity:  30 tablet   Refills:  0       ascorbic acid 500 MG Tabs   Used for:  Diffuse large B-cell lymphoma, unspecified body region (H)        Dose:  500 mg   Take 1 tablet (500 mg) by mouth daily   Quantity:  30 tablet   Refills:  0       atenolol 25 MG tablet   Commonly known as:  TENORMIN   Used for:  Atrial tachycardia (H)        Dose:  25 mg   Take 1 tablet (25 mg) by mouth 2 times daily   Quantity:  60 tablet   Refills:  0       calcium polycarbophil 625 MG tablet   Commonly known as:  FIBERCON   Indication:  loose stool        Dose:  1 tablet   Take 1 tablet by mouth 2 times daily   Refills:  0        calcium-vitamin D 600-400 MG-UNIT per tablet   Commonly known as:  CALTRATE   Used for:  Diffuse large B-cell lymphoma, unspecified body region (H)        Dose:  2 tablet   Take 2 tablets by mouth every morning   Quantity:  60 tablet   Refills:  0       digoxin 125 MCG tablet   Commonly known as:  LANOXIN   Used for:  Atrial tachycardia (H)        Dose:  125 mcg   Take 1 tablet (125 mcg) by mouth daily   Quantity:  30 tablet   Refills:  0       fluconazole 200 MG tablet   Commonly known as:  DIFLUCAN   Indication:  lymphoma, starting chemotherapy, neutropenic   Used for:  Diffuse large B-cell lymphoma, unspecified body region (H)        Dose:  200 mg   Take 1 tablet (200 mg) by mouth daily   Quantity:  30 tablet   Refills:  0       gabapentin 100 MG capsule   Commonly known as:  NEURONTIN   Used for:  Critical illness myopathy        Dose:  200 mg   Take 2 capsules (200 mg) by mouth 3 times daily   Quantity:  120 capsule   Refills:  0       lactobacillus rhamnosus (GG) capsule   Used for:  Physical deconditioning        Dose:  1 capsule   Take 1 capsule by mouth 2 times daily   Quantity:  60 capsule   Refills:  0       levofloxacin 250 MG tablet   Commonly known as:  LEVAQUIN   Indication:  prophylaxis. DLBCL s/p chemo.   Used for:  Diffuse large B-cell lymphoma, unspecified body region (H)        Dose:  250 mg   Take 1 tablet (250 mg) by mouth daily   Quantity:  30 tablet   Refills:  0       multivitamin, therapeutic with minerals Tabs tablet   Used for:  Diffuse large B-cell lymphoma, unspecified body region (H)        Dose:  1 tablet   Take 1 tablet by mouth daily   Quantity:  30 each   Refills:  0       predniSONE 5 MG tablet   Commonly known as:  DELTASONE   Used for:  Diffuse large B-cell lymphoma, unspecified body region (H)        Dose:  5 mg   Take 1 tablet (5 mg) by mouth daily   Quantity:  30 tablet   Refills:  0       thiamine 50 MG Tabs   Used for:  Diffuse large B-cell lymphoma, unspecified body region  (H)        Dose:  50 mg   Take 1 tablet (50 mg) by mouth daily   Quantity:  30 tablet   Refills:  0            Where to get your medicines      These medications were sent to [facility] - [place], [place] - [address]  [address] [facility] [address]     Phone:  [phone]     enoxaparin 60 MG/0.6ML injection    furosemide 20 MG tablet    leucovorin 15 MG Tabs    ondansetron 8 MG ODT tab    potassium & sodium phosphates 280-160-250 MG Packet    potassium chloride SA 20 MEQ CR tablet         Some of these will need a paper prescription and others can be bought over the counter. Ask your nurse if you have questions.     Bring a paper prescription for each of these medications     traMADol 50 MG tablet       You don't need a prescription for these medications     acetaminophen 325 MG tablet                Protect others around you: Learn how to safely use, store and throw away your medicines at www.disposemymeds.org.             Medication List: This is a list of all your medications and when to take them. Check marks below indicate your daily home schedule. Keep this list as a reference.      Medications           Morning Afternoon Evening Bedtime As Needed    acetaminophen 325 MG tablet   Commonly known as:  TYLENOL   Take 2 tablets (650 mg) by mouth every 4 hours as needed for mild pain, fever or headaches   Last time this was given:  650 mg on 9/9/2017  4:16 PM                                   acyclovir 400 MG tablet   Commonly known as:  ZOVIRAX   Take 1 tablet (400 mg) by mouth 2 times daily   Last time this was given:  400 mg on 9/11/2017  8:44 AM            800           800               allopurinol 300 MG tablet   Commonly known as:  ZYLOPRIM   Take 1 tablet (300 mg) by mouth daily   Last time this was given:  300 mg on 9/11/2017  8:44 AM            800                       ascorbic acid 500 MG Tabs   Take 1 tablet (500 mg) by mouth daily   Last time this was given:   500 mg on 9/11/2017 12:20 PM                1200                   atenolol 25 MG tablet   Commonly known as:  TENORMIN   Take 1 tablet (25 mg) by mouth 2 times daily   Last time this was given:  25 mg on 9/11/2017  8:44 AM            800                       calcium polycarbophil 625 MG tablet   Commonly known as:  FIBERCON   Take 1 tablet by mouth 2 times daily   Last time this was given:  1,250 mg on 9/11/2017  8:44 AM            800           800               calcium-vitamin D 600-400 MG-UNIT per tablet   Commonly known as:  CALTRATE   Take 2 tablets by mouth every morning   Last time this was given:  2 tablets on 9/11/2017 12:20 PM                1200                   digoxin 125 MCG tablet   Commonly known as:  LANOXIN   Take 1 tablet (125 mcg) by mouth daily   Last time this was given:  125 mcg on 9/11/2017  8:44 AM            800                       enoxaparin 60 MG/0.6ML injection   Commonly known as:  LOVENOX   Inject 0.6 mLs (60 mg) Subcutaneous every 12 hours for 30 days   Last time this was given:  70 mg on 9/11/2017  6:57 AM            800           800               fluconazole 200 MG tablet   Commonly known as:  DIFLUCAN   Take 1 tablet (200 mg) by mouth daily            800                       furosemide 20 MG tablet   Commonly known as:  LASIX   Take 1 tablet (20 mg) by mouth daily for 5 days            800                       gabapentin 100 MG capsule   Commonly known as:  NEURONTIN   Take 2 capsules (200 mg) by mouth 3 times daily   Last time this was given:  200 mg on 9/11/2017  8:44 AM            800       200       800               lactobacillus rhamnosus (GG) capsule   Take 1 capsule by mouth 2 times daily            800           800               leucovorin 15 MG Tabs   Take 1 tablet (15 mg) by mouth every 6 hours for 3 days   Last time this was given:  20 mg on 9/11/2017 12:53 PM            600       1200       600       1200           levofloxacin 250 MG tablet   Commonly known  as:  LEVAQUIN   Take 1 tablet (250 mg) by mouth daily            800                       multivitamin, therapeutic with minerals Tabs tablet   Take 1 tablet by mouth daily   Last time this was given:  1 tablet on 9/11/2017 12:20 PM                1200                   ondansetron 8 MG ODT tab   Commonly known as:  ZOFRAN-ODT   Take 1 tablet (8 mg) by mouth every 8 hours as needed                                   potassium & sodium phosphates 280-160-250 MG Packet   Commonly known as:  NEUTRA-PHOS   Take 1 packet by mouth 2 times daily            800        800               potassium chloride SA 20 MEQ CR tablet   Commonly known as:  K-DUR/KLOR-CON M   Take 2 tablets (40 mEq) by mouth daily while taking furosemide (Lasix). You can decrease potassium to 20 mEq daily once off Lasix.   Last time this was given:  40 mEq on 9/11/2017  8:45 AM            800                       predniSONE 5 MG tablet   Commonly known as:  DELTASONE   Take 1 tablet (5 mg) by mouth daily   Last time this was given:  5 mg on 9/11/2017  8:44 AM            800                       thiamine 50 MG Tabs   Take 1 tablet (50 mg) by mouth daily   Last time this was given:  50 mg on 9/11/2017 12:20 PM                1200                   traMADol 50 MG tablet   Commonly known as:  ULTRAM   Take 1 tablet (50 mg) by mouth every 6 hours as needed for moderate pain   Last time this was given:  50 mg on 9/9/2017  8:32 PM

## 2017-09-07 NOTE — PLAN OF CARE
Problem: Chemotherapy Effects (Adult)  Goal: Signs and Symptoms of Listed Potential Problems Will be Absent or Manageable (Chemotherapy Effects)  Signs and symptoms of listed potential problems will be absent or manageable by discharge/transition of care (reference Chemotherapy Effects (Adult) CPG). First HD MTX Pt will be able to eat 50% of meals, Have no falls and no +BC   Outcome: No Change  Pt admitted to 7D this am for high dose Methotrexate.  Pt familiar with medication and did review side effects of medication and how it is infused.  Plt count 70K and Dr Chadwick did approve giving treatment with plt count of 70K.  Plan for PICC then start bicarb flush. Bicarb pills where started. Bree said she has been feeling well the past week.  Eating well and strength improved.  Does continue with weakness and neuropathy feet/legs.  Uses a walker and will use commode with IVF running.

## 2017-09-07 NOTE — LETTER
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 Presented on previous admission with cardiac arrest and significant cytopenias. She received 1 dose R-EPOCH. Her current regimen is R-CHOP with HD MTX on D15 of Cycles 2, 4, and 6. She received C2 on 8/23/17. She was admitted for C2D15 of HD MTX. Of note, she had a negative LP on 8/23.     Discharge Plan:  Home with clinic follow up and home care services resumed.     Discharge Needs Assessment:  Needs       Most Recent Value    Anticipated Changes Related to Illness none    Equipment Currently Used at Home walker, rolling        Follow-up plan:  Future Appointments  Date Time Provider Department Center   9/13/2017 11:15 AM  [facility] DRAW [facility]   9/13/2017 12:00 PM Lenore Rodriguez MD [facility]   9/15/2017 10:00 AM Pj Cunha MD [facility]   9/20/2017 8:30 AM  [facility] DRAW [facility]   9/20/2017 9:00 AM UC ONCOLOGY INFUSION [facility]   9/27/2017 1:00 PM 1, Uc Cv Device [facility]   9/27/2017 1:30 PM Juan Eaton MD [facility]   10/2/2017 9:30 AM Archana Green PA-C [facility]       Any outstanding tests or procedures:        Referrals     Future Labs/Procedures    Home care nursing referral     Comments:    [facility]  Phone  [phone]  Fax  [phone]    RN skilled nursing visit. RN to assess vital signs and weight, respiratory and cardiac status, pain level and activity  tolerance, incision for signs/symptoms of infection and home safety.  RN to assist with wound care. WOC RN to see as needed.  RN to teach medication management.    Patient discharging with PICC. Home RN does not need to assist with cares. Per patient preference, she will have done in clinic. Thank you.    Your provider has ordered home care nursing services. If you have not been contacted within 2 days of your discharge please call the inpatient department phone number at [phone] .          Gloria Rhoades RN  Phone [phone]  Pager [phone]    AVS/Discharge Summary is the source of truth; this is a helpful guide for improved communication of patient story

## 2017-09-07 NOTE — PROGRESS NOTES
[facility] Nurse Inpatient Wound Assessment    Initial Assessment  Reason for consultation: Evaluate and treat Left arm extravasation wound (chronic)    ASSESSMENT:   Left arm wound due to extravasation  Status: Wounds with yellow stringy fibrinous bases, no slough, suspect tendon sheath which is not granulating  Patient receiving ongoing chemo treatments  TREATMENT  PLAN  Left upper arm wounds every 5th day: Clean wound and skin around wound with microklenz.  Paint Cavilon no sting barrier to skin around wound.  Cut small pieces of Hydrofera Blue Ready (Romeo #[id number]) and pack into wounds. This is an antibacterial foam dressing. Cover with small piece of Vaseline Gauze and two mepilex border dressings.      Orders written  WO Nurse follow-up plan: Weekly  Nursing to notify the Provider(s) and re-consult the WO Nurse if wound(s) deteriorates or new skin concern.    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  Presented with cardiac arrest and significant cytopenias  Objective Data   Containment of urine/stool: Continent of bowel and Continent of bladder  Active Diet Order    Active Diet Order      Regular Diet Adult  Output:       Risk Assessment:   Jay Jay Score  Av.1  Min: 13  Max: 20                           Labs:     Recent Labs  Lab 17  1000   HGB 9.1*   WBC 6.5       PHYSICAL EXAM:  Skin assessment: left upper arm    Wound Location:  Left  Arm  (no picture taked today)  Wound History: IV extravasation wound with suspected tendon sheath in base that is not granular. Lower wound has contracted some but upper remains the same size.      pic taken- 17    Measurements (length x width x depth, in cm)   Proximal to distal:  2 x 0.9 x 0.9 cm, 1.2 x 0.5 x 0.5   Up to 0.2 cm of undermining on proximal wound at 12:00   Wound Base: all with yellow stringy fiberous bases, visible suture in proximal  wound  Palpation of the wound bed: normal  Periwound skin: intact, closed skin edges  Color: pink  Temperature: normal   Drainage: small  Description of drainage: serous  Odor: none  Pain: none with wound cleansing    INTERVENTIONS:  Current support surface: Standard  Atmos Air  Current off-loading measures: Pillows under calves  Visual inspection of wounds(s) completed.  Wound Care: was done per plan of care.  Supplies ordered: Hydrafera Blue Ready  Education provided today: wound care    Discussed plan of care with Patient and RN  Face to face time: 20 minutes

## 2017-09-07 NOTE — IP AVS SNAPSHOT
[address] [address]    Phone:  [phone]                                       After Visit Summary   9/7/2017    Amelia Michel    MRN: [id number]           After Visit Summary Signature Page     I have received my discharge instructions, and my questions have been answered. I have discussed any challenges I see with this plan with the nurse or doctor.    ..........................................................................................................................................  Patient/Patient Representative Signature      ..........................................................................................................................................  Patient Representative Print Name and Relationship to Patient    ..................................................               ................................................  Date                                            Time    ..........................................................................................................................................  Reviewed by Signature/Title    ...................................................              ..............................................  Date                                                            Time

## 2017-09-07 NOTE — H&P
[facility], [place]    History and Physical  Hospitalist       Date of Admission:  9/7/2017  Date of Service (when I saw the patient): 09/07/17    Assessment & Plan   Amelia is a 56 year old female with h/o RA and new diagnosis of stage IVB DLBCL with high risk, double hit.  Presented with cardiac arrest and significant cytopenias. She received 1 dose of R-EPOCH and her current regimen is R-CHOP with HD MTX on D15 of Cycles 2, 4 and 6. She received cycle #2 of RCHOP on 8/23/17 and presents for admission for C2D15 of HD MTX (this is her first time receiving it). She had a negative LP on 8/23.      ONC:   #DLBCL, stage IVB, high risk:  Treated with R-EPOCH for cycle #1 but will move forward with R-CHOP (for five more cycles).  She will receive HD MTX inpatient on days 15 of cycle 2, 4 and 6 due to high IPI.  Platelet count in clinic yesterday was 64k so chemo was delayed until today. Plt count today (9/7) was 70k, approved by attending MD to proceed.   -cont allopurinol  - PICC placed on admission    REGIMEN: D1=9/7/17  MTX 5.25 g D1  Leucovorin 50 mg IV 24 hours after MTX, then 25 mg every 6 hours until MTX <0.1  Premeds: zofran 16 mg D1, dex 12 mg D1, 8 mg D2 and D3  IVF with D5 + 0.45% NS with sodium bicarb 190/h       HEME:   #Anemia and thrombocytopenia 2/2 DLBCL and chemotherapy: presented with pancytopenia, worsened with chemo and now recovering.  No current need for transfusion support.      ID:   # PPX: Continue ACV, hold levaquin and fluconazole while not neutropenic     CV:   #Afib: Currently in NSR. ECHO on 8/31/17- EF of 55-60%, does have mitral and tricuspid insufficiency.  Murmur noted on exam.   -Continue PTA digoxin and atenolol.  Sees cardiology end of September     :   #Urinary incontinence: h/o saddle anesthesia and urinary incontinence at night mostly, wears depends.  Will need urology and urodynamic studies outpatient- sees next month.      GI:   #LFT elevation:  likely related to liver involvement.  Continues to trend down.      RHEUM:  #H/o RA: 30 + years of RA, long history of therapy with MTX, pred and HCQ.    - Continue PTA 5 mg pred daily.    - 9/15 f/up with Dr Cunha, rheumatology    DERM:   #TITO extravasation: This is from medications given during cardiac arrest. Per patient healing well. Per most recent WOC RN instructions (8/14/17): Left upper arm wounds every other day: Clean wound and skin around wound with microklenz.  Paint Cavilon no sting barrier to skin around wound.  Apply Iodosorb gel nickel thick to areas with slough.  Tuck minimal amount of isodosrob gel to undermined area on middle wound with Q-tip. Cover with two mepilex border dressings.     - WOC RN consult    DVT Prophylaxis: Enoxaparin (Lovenox) SQ while platelets are >50k  Code Status: Full Code    Disposition: Expected discharge in 3-5 days once MTX level has cleared    Discussed with attending, Dr. Netta Enriquez PA-C  Hematology/Oncology    Primary Care Physician   Comfort Sabillon    Chief Complaint   Admission for chemotherapy with HD MTX for double hit DLBCL, current regimen is cycle #2 of RCHOP, HD MTX    History is obtained from the patient and chart review    History of Present Illness   Amelia is a 56 year old female with h/o RA and new diagnosis of stage IVB DLBCL with high risk, double hit.  Presented with cardiac arrest and significant cytopenias. She received 1 dose of R-EPOCH and her current regimen is R-CHOP with HD MTX on D15 of Cycles 2, 4 and 6. She received cycle #2 of RCHOP on 8/23/17 and presents for admission for C2D15 of HD MTX (this is her first time receiving it). She had a negative LP on 8/23. She states that she is has been feeling well overall     Past Medical History    I have reviewed this patient's medical history and updated it with pertinent information if needed.   Past Medical History:   Diagnosis Date     A-fib (H)      Cardiac arrest (H)       Hypertension      Neuropathy (H)      Rheumatoid arthritis(714.0)        Past Surgical History   I have reviewed this patient's surgical history and updated it with pertinent information if needed.  Past Surgical History:   Procedure Laterality Date     ANESTHESIA CARDIOVERSION N/A 5/17/2017    Procedure: ANESTHESIA CARDIOVERSION;  ANESTHESIA CARDIOVERSION;  Surgeon: GENERIC ANESTHESIA PROVIDER;  Location: [facility] OR     ARTHRODESIS WRIST  2000    Right wrist     BONE MARROW ASPIRATE &BIOPSY  7/12/2017          FOOT SURGERY      4 left and 2 right     RELEASE CARPAL TUNNEL BILATERAL       REPAIR VENTRICULAR SEPTAL DEFECT  1969       Prior to Admission Medications   Prior to Admission Medications   Prescriptions Last Dose Informant Patient Reported? Taking?   acyclovir (ZOVIRAX) 400 MG tablet   No No   Sig: Take 1 tablet (400 mg) by mouth 2 times daily   allopurinol (ZYLOPRIM) 300 MG tablet   No No   Sig: Take 1 tablet (300 mg) by mouth daily   ascorbic acid 500 MG TABS   No No   Sig: Take 1 tablet (500 mg) by mouth daily   atenolol (TENORMIN) 25 MG tablet   No No   Sig: Take 1 tablet (25 mg) by mouth 2 times daily   calcium polycarbophil (FIBERCON) 625 MG tablet   Yes No   Sig: Take 1 tablet by mouth 2 times daily   calcium-vitamin D (CALTRATE) 600-400 MG-UNIT per tablet   No No   Sig: Take 2 tablets by mouth every morning   digoxin (LANOXIN) 125 MCG tablet   No No   Sig: Take 1 tablet (125 mcg) by mouth daily   fluconazole (DIFLUCAN) 200 MG tablet   No No   Sig: Take 1 tablet (200 mg) by mouth daily   gabapentin (NEURONTIN) 100 MG capsule   No No   Sig: Take 2 capsules (200 mg) by mouth 3 times daily   lactobacillus rhamnosus, GG, (CULTURELL) capsule   No No   Sig: Take 1 capsule by mouth 2 times daily   levofloxacin (LEVAQUIN) 250 MG tablet   No No   Sig: Take 1 tablet (250 mg) by mouth daily   multivitamin, therapeutic with minerals (THERA-VIT-M) TABS tablet   No No   Sig: Take 1 tablet by mouth daily   potassium  chloride SA (K-DUR/KLOR-CON M) 20 MEQ CR tablet   No No   Sig: Take 1 tablet (20 mEq) by mouth daily   predniSONE (DELTASONE) 5 MG tablet   No No   Sig: Take 1 tablet (5 mg) by mouth daily   thiamine 50 MG TABS   No No   Sig: Take 1 tablet (50 mg) by mouth daily      Facility-Administered Medications: None     Allergies   Allergies   Allergen Reactions     Metoprolol Other (See Comments)     Pt and  report that metoprolol does not work for her and also reports feeling unwell with this medication. She has been able to tolerate atenolol, which as worked in controlling her HR.      No Clinical Screening - See Comments      Penicillin Allergy Skin Test not performed, see antimicrobial management team progress note 7/5/17.       Penicillins      Tape [Adhesive Tape] Rash       Social History   I have reviewed this patient's social history and updated it with pertinent information if needed. Amelia Michel  reports that she has never smoked. She has never used smokeless tobacco. She reports that she drinks alcohol. She reports that she does not use illicit drugs.    Family History   I have reviewed this patient's family history and updated it with pertinent information if needed.   Family History   Problem Relation Age of Onset     Breast Cancer Mother      Hypertension Mother      Alzheimer Disease Mother      Hypertension Father      Blood Disease Father      Lymphoa     Circulatory Father      A Fib     DIABETES Paternal Grandmother        Review of Systems   The 10 point Review of Systems is negative other than noted in the HPI or here.     Physical Exam   Temp: 97.5  F (36.4  C) Temp src: Oral BP: 129/63 Pulse: 63   Resp: 18 SpO2: 98 % O2 Device: None (Room air)    Vital Signs with Ranges  Temp:  [97.5  F (36.4  C)] 97.5  F (36.4  C)  Pulse:  [63] 63  Resp:  [18] 18  BP: (129)/(63) 129/63  SpO2:  [98 %] 98 %  121 lbs 0 oz    Constitutional: Well appearing, no acute distress  Eyes: PERRL, EOMs intact, sclera  anicteric, conjunctiva clear  HEENT: normocephalic, atraumatic, alopecia, moist mucus membranes, no thrush, one small sore anterior lower gums  Respiratory: clear to auscultation bilaterally  Cardiovascular: RRR, +holosystolic murmur  GI: soft, nondistended, nontender  Genitourinary: deferred  Skin: known wound on the left inner arm from previous extravasation, covered, will eval later when RN changes the dressing  Musculoskeletal: No LE edema  Neurologic: A&O x 3, no focal deficits, CN II-XII intact  Psychiatric: appropriate affect    Data   Data reviewed today:  I personally reviewed no images or EKG's today.    Recent Labs  Lab 09/06/17  0744 09/04/17  0930 09/02/17  0844   WBC 6.2 4.6 3.5*   HGB 9.6* 8.6* 9.0*   * 100 98   PLT 64* 38* 32*     --   --    POTASSIUM 3.9  --   --    CHLORIDE 106  --   --    CO2 30  --   --    BUN 17  --   --    CR 0.63  --   --    ANIONGAP 5  --   --    VIKTORIYA 9.2  --   --    *  --   --    ALBUMIN 3.4  --   --    PROTTOTAL 6.8  --   --    BILITOTAL 0.5  --   --    ALKPHOS 119  --   --    ALT 67*  --   --    AST 35  --   --        No results found for this or any previous visit (from the past 24 hour(s)).     I have seen, interviewed, and examined the patient independently.  I have reviewed the vital signs and labs.  This note reflects my assessment and plan.    She is feeling well and ready for chemo.    plts today 70, due to recent chemo. It is safe to proceed with chemo with this plt count    Nany Chadwick MD/PhD

## 2017-09-07 NOTE — PROGRESS NOTES
DATE/TIME  (DOT-TD, DOT-NOW) CHEMO CHECK ACTIVITY (REGIMEN & DOSE CHECK, DAY, DOSE #, NAME OF CHEMO #1)  CHEMO DRUG #2  CHEMO DRUG #3 NAME OF RN #1 (USE DOT-ME HERE) NAME OF RN#2 (2ND RN TO LOG IN SEPARATELY)   9/7/2017  10:55 AM   Protocol check   Lizzy Artis     9/7/2017  6:12 PM   Dose #1 HD MTX double check.   Cathleen Sprague

## 2017-09-08 LAB
ANION GAP SERPL CALCULATED.3IONS-SCNC: 6 MMOL/L (ref 3–14)
BASOPHILS # BLD AUTO: 0 10E9/L (ref 0–0.2)
BASOPHILS NFR BLD AUTO: 0 %
BUN SERPL-MCNC: 18 MG/DL (ref 7–30)
CALCIUM SERPL-MCNC: 7.9 MG/DL (ref 8.5–10.1)
CHLORIDE SERPL-SCNC: 104 MMOL/L (ref 94–109)
CO2 SERPL-SCNC: 32 MMOL/L (ref 20–32)
CREAT SERPL-MCNC: 0.51 MG/DL (ref 0.52–1.04)
DIFFERENTIAL METHOD BLD: ABNORMAL
EOSINOPHIL # BLD AUTO: 0 10E9/L (ref 0–0.7)
EOSINOPHIL NFR BLD AUTO: 0 %
ERYTHROCYTE [DISTWIDTH] IN BLOOD BY AUTOMATED COUNT: 25.5 % (ref 10–15)
GFR SERPL CREATININE-BSD FRML MDRD: >90 ML/MIN/1.7M2
GLUCOSE BLDC GLUCOMTR-MCNC: 208 MG/DL (ref 70–99)
GLUCOSE BLDC GLUCOMTR-MCNC: 242 MG/DL (ref 70–99)
GLUCOSE BLDC GLUCOMTR-MCNC: 262 MG/DL (ref 70–99)
GLUCOSE SERPL-MCNC: 226 MG/DL (ref 70–99)
HCT VFR BLD AUTO: 26.5 % (ref 35–47)
HGB BLD-MCNC: 8.8 G/DL (ref 11.7–15.7)
IMM GRANULOCYTES # BLD: 0.1 10E9/L (ref 0–0.4)
IMM GRANULOCYTES NFR BLD: 0.9 %
LYMPHOCYTES # BLD AUTO: 0.2 10E9/L (ref 0.8–5.3)
LYMPHOCYTES NFR BLD AUTO: 2.1 %
MAGNESIUM SERPL-MCNC: 1.8 MG/DL (ref 1.6–2.3)
MCH RBC QN AUTO: 33.7 PG (ref 26.5–33)
MCHC RBC AUTO-ENTMCNC: 33.2 G/DL (ref 31.5–36.5)
MCV RBC AUTO: 102 FL (ref 78–100)
MONOCYTES # BLD AUTO: 0.1 10E9/L (ref 0–1.3)
MONOCYTES NFR BLD AUTO: 0.9 %
MTX SERPL-SCNC: 98.94 UMOL/L
NEUTROPHILS # BLD AUTO: 7.8 10E9/L (ref 1.6–8.3)
NEUTROPHILS NFR BLD AUTO: 96.1 %
NRBC # BLD AUTO: 0 10*3/UL
NRBC BLD AUTO-RTO: 0 /100
PH UR STRIP: 7.5 PH (ref 5–7)
PH UR STRIP: 8 PH (ref 5–7)
PH UR STRIP: 8.5 PH (ref 5–7)
PH UR STRIP: 8.5 PH (ref 5–7)
PHOSPHATE SERPL-MCNC: 1.7 MG/DL (ref 2.5–4.5)
PHOSPHATE SERPL-MCNC: 2 MG/DL (ref 2.5–4.5)
PLATELET # BLD AUTO: 58 10E9/L (ref 150–450)
POTASSIUM SERPL-SCNC: 3.6 MMOL/L (ref 3.4–5.3)
RBC # BLD AUTO: 2.61 10E12/L (ref 3.8–5.2)
SODIUM SERPL-SCNC: 141 MMOL/L (ref 133–144)
WBC # BLD AUTO: 8.1 10E9/L (ref 4–11)

## 2017-09-08 PROCEDURE — 12000008 ZZH R&B INTERMEDIATE UMMC

## 2017-09-08 PROCEDURE — 85025 COMPLETE CBC W/AUTO DIFF WBC: CPT | Performed by: PHYSICIAN ASSISTANT

## 2017-09-08 PROCEDURE — 25000131 ZZH RX MED GY IP 250 OP 636 PS 637: Performed by: INTERNAL MEDICINE

## 2017-09-08 PROCEDURE — 25000128 H RX IP 250 OP 636: Performed by: INTERNAL MEDICINE

## 2017-09-08 PROCEDURE — 36592 COLLECT BLOOD FROM PICC: CPT | Performed by: PHYSICIAN ASSISTANT

## 2017-09-08 PROCEDURE — 00000146 ZZHCL STATISTIC GLUCOSE BY METER IP

## 2017-09-08 PROCEDURE — S5010 5% DEXTROSE AND 0.45% SALINE: HCPCS | Performed by: INTERNAL MEDICINE

## 2017-09-08 PROCEDURE — 83735 ASSAY OF MAGNESIUM: CPT | Performed by: PHYSICIAN ASSISTANT

## 2017-09-08 PROCEDURE — 36592 COLLECT BLOOD FROM PICC: CPT | Performed by: INTERNAL MEDICINE

## 2017-09-08 PROCEDURE — 81003 URINALYSIS AUTO W/O SCOPE: CPT | Performed by: INTERNAL MEDICINE

## 2017-09-08 PROCEDURE — 80299 QUANTITATIVE ASSAY DRUG: CPT | Performed by: PHYSICIAN ASSISTANT

## 2017-09-08 PROCEDURE — 84100 ASSAY OF PHOSPHORUS: CPT | Performed by: PHYSICIAN ASSISTANT

## 2017-09-08 PROCEDURE — 25000132 ZZH RX MED GY IP 250 OP 250 PS 637: Performed by: INTERNAL MEDICINE

## 2017-09-08 PROCEDURE — 25000131 ZZH RX MED GY IP 250 OP 636 PS 637: Performed by: PHYSICIAN ASSISTANT

## 2017-09-08 PROCEDURE — 25000132 ZZH RX MED GY IP 250 OP 250 PS 637: Performed by: PHYSICIAN ASSISTANT

## 2017-09-08 PROCEDURE — 99233 SBSQ HOSP IP/OBS HIGH 50: CPT | Performed by: INTERNAL MEDICINE

## 2017-09-08 PROCEDURE — 25000125 ZZHC RX 250: Performed by: PHYSICIAN ASSISTANT

## 2017-09-08 PROCEDURE — 80048 BASIC METABOLIC PNL TOTAL CA: CPT | Performed by: PHYSICIAN ASSISTANT

## 2017-09-08 PROCEDURE — 40000802 ZZH SITE CHECK

## 2017-09-08 PROCEDURE — 84100 ASSAY OF PHOSPHORUS: CPT | Performed by: INTERNAL MEDICINE

## 2017-09-08 PROCEDURE — 25800025 ZZH RX 258: Performed by: INTERNAL MEDICINE

## 2017-09-08 PROCEDURE — 25000128 H RX IP 250 OP 636: Performed by: PHYSICIAN ASSISTANT

## 2017-09-08 RX ORDER — DEXTROSE MONOHYDRATE 25 G/50ML
25-50 INJECTION, SOLUTION INTRAVENOUS
Status: DISCONTINUED | OUTPATIENT
Start: 2017-09-08 | End: 2017-09-11 | Stop reason: HOSPADM

## 2017-09-08 RX ORDER — LOPERAMIDE HCL 2 MG
2-4 CAPSULE ORAL 4 TIMES DAILY PRN
Status: DISCONTINUED | OUTPATIENT
Start: 2017-09-08 | End: 2017-09-11 | Stop reason: HOSPADM

## 2017-09-08 RX ORDER — HYDRALAZINE HYDROCHLORIDE 20 MG/ML
5-10 INJECTION INTRAMUSCULAR; INTRAVENOUS EVERY 6 HOURS PRN
Status: DISCONTINUED | OUTPATIENT
Start: 2017-09-08 | End: 2017-09-11

## 2017-09-08 RX ORDER — CALCIUM POLYCARBOPHIL 625 MG 625 MG/1
1250 TABLET ORAL 2 TIMES DAILY
Status: DISCONTINUED | OUTPATIENT
Start: 2017-09-08 | End: 2017-09-11 | Stop reason: HOSPADM

## 2017-09-08 RX ORDER — NICOTINE POLACRILEX 4 MG
15-30 LOZENGE BUCCAL
Status: DISCONTINUED | OUTPATIENT
Start: 2017-09-08 | End: 2017-09-11 | Stop reason: HOSPADM

## 2017-09-08 RX ADMIN — SODIUM BICARBONATE 650 MG TABLET 3900 MG: at 21:00

## 2017-09-08 RX ADMIN — MULTIPLE VITAMINS W/ MINERALS TAB 1 TABLET: TAB at 11:59

## 2017-09-08 RX ADMIN — ACYCLOVIR 400 MG: 400 TABLET ORAL at 20:59

## 2017-09-08 RX ADMIN — ALLOPURINOL 300 MG: 300 TABLET ORAL at 08:56

## 2017-09-08 RX ADMIN — SODIUM BICARBONATE 650 MG TABLET 3900 MG: at 04:16

## 2017-09-08 RX ADMIN — SODIUM BICARBONATE 650 MG TABLET 3900 MG: at 08:56

## 2017-09-08 RX ADMIN — POTASSIUM CHLORIDE 20 MEQ: 750 TABLET, EXTENDED RELEASE ORAL at 08:56

## 2017-09-08 RX ADMIN — DIGOXIN 125 MCG: 125 TABLET ORAL at 08:56

## 2017-09-08 RX ADMIN — ALUMINUM HYDROXIDE, MAGNESIUM HYDROXIDE, SIMETHICONE 10 ML: 400; 400; 40 SUSPENSION ORAL at 22:41

## 2017-09-08 RX ADMIN — ATENOLOL 25 MG: 25 TABLET ORAL at 20:59

## 2017-09-08 RX ADMIN — Medication: at 07:03

## 2017-09-08 RX ADMIN — Medication: at 01:35

## 2017-09-08 RX ADMIN — SODIUM CHLORIDE: 9 INJECTION, SOLUTION INTRAVENOUS at 21:10

## 2017-09-08 RX ADMIN — PREDNISONE 5 MG: 5 TABLET ORAL at 08:56

## 2017-09-08 RX ADMIN — ACYCLOVIR 400 MG: 400 TABLET ORAL at 08:56

## 2017-09-08 RX ADMIN — CALCIUM POLYCARBOPHIL 1250 MG: 625 TABLET, FILM COATED ORAL at 20:59

## 2017-09-08 RX ADMIN — SODIUM BICARBONATE 650 MG TABLET 3900 MG: at 00:58

## 2017-09-08 RX ADMIN — Medication 2 TABLET: at 11:58

## 2017-09-08 RX ADMIN — RANITIDINE HYDROCHLORIDE 150 MG: 150 TABLET, FILM COATED ORAL at 20:59

## 2017-09-08 RX ADMIN — Medication: at 12:44

## 2017-09-08 RX ADMIN — CALCIUM POLYCARBOPHIL 1250 MG: 625 TABLET, FILM COATED ORAL at 12:01

## 2017-09-08 RX ADMIN — LEUCOVORIN CALCIUM 50 MG: 100 INJECTION, POWDER, LYOPHILIZED, FOR SOLUTION INTRAMUSCULAR; INTRAVENOUS at 21:00

## 2017-09-08 RX ADMIN — POTASSIUM PHOSPHATE, MONOBASIC AND POTASSIUM PHOSPHATE, DIBASIC 15 MMOL: 224; 236 INJECTION, SOLUTION INTRAVENOUS at 18:57

## 2017-09-08 RX ADMIN — Medication: at 18:09

## 2017-09-08 RX ADMIN — RANITIDINE HYDROCHLORIDE 150 MG: 150 TABLET, FILM COATED ORAL at 08:56

## 2017-09-08 RX ADMIN — DEXAMETHASONE 8 MG: 4 TABLET ORAL at 08:56

## 2017-09-08 RX ADMIN — OXYCODONE HYDROCHLORIDE AND ACETAMINOPHEN 500 MG: 500 TABLET ORAL at 11:58

## 2017-09-08 RX ADMIN — GABAPENTIN 200 MG: 100 CAPSULE ORAL at 08:56

## 2017-09-08 RX ADMIN — POTASSIUM PHOSPHATE, MONOBASIC AND POTASSIUM PHOSPHATE, DIBASIC 20 MMOL: 224; 236 INJECTION, SOLUTION INTRAVENOUS at 10:03

## 2017-09-08 RX ADMIN — THIAMINE HCL (VITAMIN B1) 50 MG TABLET 50 MG: at 11:59

## 2017-09-08 RX ADMIN — GABAPENTIN 200 MG: 100 CAPSULE ORAL at 20:59

## 2017-09-08 RX ADMIN — SODIUM BICARBONATE 650 MG TABLET 3900 MG: at 12:00

## 2017-09-08 RX ADMIN — ATENOLOL 25 MG: 25 TABLET ORAL at 04:17

## 2017-09-08 RX ADMIN — SODIUM BICARBONATE 650 MG TABLET 3900 MG: at 16:24

## 2017-09-08 RX ADMIN — GABAPENTIN 200 MG: 100 CAPSULE ORAL at 14:02

## 2017-09-08 RX ADMIN — INSULIN ASPART 1 UNITS: 100 INJECTION, SOLUTION INTRAVENOUS; SUBCUTANEOUS at 22:41

## 2017-09-08 NOTE — PLAN OF CARE
Problem: Chemotherapy Effects (Adult)  Goal: Signs and Symptoms of Listed Potential Problems Will be Absent or Manageable (Chemotherapy Effects)  Signs and symptoms of listed potential problems will be absent or manageable by discharge/transition of care (reference Chemotherapy Effects (Adult) CPG). First HD MTX Pt will be able to eat 50% of meals, Have no falls and no +BC   Outcome: No Change  AVSS. Afebrile. Denies N/V and pain. Up independently in room to commode.  Has mild urinary incontinence, but is able to provide urine sample for pH checks. Last urine pH @ 8, due again at 2330.  Dose #1 of HD mtx infusing over 3 hours, tolerating well.  PICC dressing changed, had much bleeding after PICC insertion, site is now CDI.  Had a wound on her left arm from a previous extravasation, WOC nurse saw patient and dressed wound.  Pt having multiple soft BM's, not watery at this time, will monitor.  Good UOP.  Continue to monitor and with POC.

## 2017-09-08 NOTE — PROGRESS NOTES
Care Coordinator- Discharge Planning     Admission Date/Time:  9/7/2017  Attending MD:  Nany Chadwick MD  Hematologist: Dr. Rodriguez/[facility]     Data  Date of initial CC assessment:  09/08/2017  Chart reviewed, discussed with interdisciplinary team.   Patient was admitted for:   1. Diffuse large B-cell lymphoma of extranodal site (H)       Assessment  Concerns with insurance coverage for discharge needs: None.  Current Living Situation: Patient lives with spouse.  Support System: Supportive and Involved  Services Involved: Home Care. Patient on hold with [facility] (RN/PT)  Transportation: Family or Friend will provide  Barriers to Discharge: Methotrexate Clearance     Patient admitted for high dose methotrexate. Follow up scheduled prior to patient's admission.    Coordination of Care and Referrals: Provided patient/family with options for Home Care.    Writer met with patient and her  to review the role of the care coordinator and assess discharge needs. Patient confirmed she would like to resume services with [facility] upon discharge; AVS updated and [facility] notified.    Per patient she will discharge with PICC. Per patient preference, cares will be done in clinic as she does not have home coverage.      Plan  Anticipated Discharge Date:  9/10/2017  Anticipated Discharge Plan:  Home with resumption of home care services and clinic follow up    CTS Handoff completed:  NO (will send when discharge plan confirmed)    Gloria Rhoades RNCC  Phone [phone]  Pager [phone]

## 2017-09-08 NOTE — PROGRESS NOTES
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  Presented with cardiac arrest and significant cytopenias. She received 1 dose of R-EPOCH and her current regimen is R-CHOP with HD MTX on D15 of Cycles 2, 4 and 6. She received cycle #2 of RCHOP on 8/23/17 and presents for admission for C2D15 of HD MTX (this is her first time receiving it). She had a negative LP on 8/23.       ONC:   #DLBCL, stage IVB, high risk:  Treated with R-EPOCH for cycle #1 but will move forward with R-CHOP (for five more cycles).  She will receive HD MTX inpatient on days 15 of cycle 2, 4 and 6 due to high IPI.  Platelet count in clinic on 9/6 was 64k so chemo was delayed until today. Plt count on admission (9/7) was 70k, approved by attending MD to proceed.   -cont allopurinol  - PICC placed on admission, will plan to d/c with PICC line as she has chemo again next week     REGIMEN: D1=9/7/17; Today is D2  MTX 5.25 g D1- Completed  Leucovorin 50 mg IV 24 hours after MTX, then 25 mg every 6 hours until MTX <0.1  Premeds: zofran 16 mg D1, dex 12 mg D1, 8 mg D2 and D3  IVF with D5 + 0.45% NS with sodium bicarb 190/h         HEME:   #Anemia and thrombocytopenia 2/2 DLBCL and chemotherapy: presented with pancytopenia, worsened with chemo and now recovering.  No current need for transfusion support.       ID:   # PPX: Continue ACV, hold levaquin and fluconazole while not neutropenic     CV:   #Afib: Currently in NSR. ECHO on 8/31/17- EF of 55-60%, does have mitral and tricuspid insufficiency.  Murmur noted on exam.   -Continue PTA digoxin and atenolol.  Sees cardiology end of September      :   #Urinary incontinence: h/o saddle anesthesia and urinary incontinence at night mostly, wears depends.  Will need urology and  urodynamic studies outpatient- sees next month.       GI:   #LFT elevation: likely related to liver involvement.  Continues to trend down.    #Frequent soft stools: increase PO fiber to 2 tabs bid and prn imodium       RHEUM:  #H/o RA: 30 + years of RA, long history of therapy with MTX, pred and HCQ.    - Continue PTA 5 mg pred daily.    - 9/15 f/up with Dr Cunha, rheumatology     DERM:   #LUE extravasation: This is from medications given during cardiac arrest. Per patient healing well. WOC RN consulted, recs appreciated. New recommendations: Left upper arm wounds every 5th day: Clean wound and skin around wound with microklenz.  Paint Cavilon no sting barrier to skin around wound.  Cut small pieces of Hydrofera Blue Ready ([employer] #[id number]) and pack into wounds. This is an antibacterial foam dressing. Cover with small piece of Vaseline Gauze and two mepilex border dressings.    ENDO:  #Steroid induced hyperglycemia: fasting glucose this AM is 226, due to PO dex and D5 in IVF  - Accuchecks and medium intensity SSI    FEN:  Regular diet  Electrolyte replacement prn per protocol    DVT Prophylaxis: Pt ambulating regularly, PCDs  Code Status: Full Code     Disposition: Expected discharge in 3-5 days once MTX level has cleared     Discussed with attending, Dr. Netta Enriquez PA-C  Hematology/Oncology    Interval History   She states that she is feeling well overall today. She is a little swollen due to all the fluids. No fevers or chills. No CP, SOB or cough. No headaches or dizziness. She is eating well. No n/v. Stools are frequent, small and soft but no watery stools. This is not abnormal for her but would like to try to improve it. No new rashes.     Physical Exam   Temp: 97.3  F (36.3  C) Temp src: Oral BP: 125/51 Pulse: 72 Heart Rate: 72 Resp: 16 SpO2: 97 % O2 Device: None (Room air)    Vitals:    09/07/17 0911 09/08/17 0703   Weight: 54.9 kg (121 lb) 56.8 kg (125 lb 4.8 oz)     Vital Signs  with Ranges  Temp:  [96.3  F (35.7  C)-98.2  F (36.8  C)] 97.3  F (36.3  C)  Pulse:  [69-75] 72  Heart Rate:  [72] 72  Resp:  [16-18] 16  BP: (125-182)/(48-76) 125/51  SpO2:  [95 %-98 %] 97 %  I/O last 3 completed shifts:  In: 1836 [P.O.:236; I.V.:1600]  Out: 2835 [Urine:2835]    Constitutional: Well appearing, no acute distress  Eyes:  sclera anicteric, conjunctiva clear  HEENT: normocephalic, atraumatic, alopecia, moist mucus membranes, no thrush, one small sore anterior lower gums  Respiratory: clear to auscultation bilaterally  Cardiovascular: RRR, +holosystolic murmur  GI: soft, nondistended, nontender  Skin: known wound on the left inner arm from previous extravasation, covered, will eval later when RN changes the dressing  Musculoskeletal: No LE edema, PICC line right arm is c/d/i  Neurologic: A&O x 3, no focal deficits, CN II-XII intact  Psychiatric: appropriate affect    Medications     IV infusion builder WITH additives 190 mL/hr at 09/08/17 1244     NaCl       - MEDICATION INSTRUCTIONS -       NaCl         calcium polycarbophil  1,250 mg Oral BID     insulin aspart  1-7 Units Subcutaneous TID AC     insulin aspart  1-5 Units Subcutaneous At Bedtime     sodium bicarbonate  3,900 mg Oral Q4H     dexamethasone  8 mg Oral Daily     leucovorin  50 mg Intravenous Once     [START ON 9/9/2017] leucovorin  25 mg Intravenous Q6H     acyclovir  400 mg Oral BID     allopurinol  300 mg Oral Daily     ascorbic acid  500 mg Oral Daily     atenolol  25 mg Oral BID     calcium-vitamin D  2 tablet Oral QAM     gabapentin  200 mg Oral TID     multivitamin, therapeutic with minerals  1 tablet Oral Daily     potassium chloride SA  20 mEq Oral Daily     predniSONE  5 mg Oral Daily     thiamine  50 mg Oral Daily     ranitidine  150 mg Oral BID     digoxin  125 mcg Oral Daily     sodium chloride (PF)  10 mL Intracatheter Q7 Days     sodium chloride (PF)  10 mL Intracatheter Q7 Days       Data   Results for orders placed or  performed during the hospital encounter of 09/07/17 (from the past 24 hour(s))   pH urine POCT   Result Value Ref Range    pH Urine 8.0 5.0 - 7.0 pH   pH urine POCT   Result Value Ref Range    pH Urine 7.5 5.0 - 7.0 pH   pH urine POCT   Result Value Ref Range    pH Urine 8.5 5.0 - 7.0 pH   CBC with platelets differential   Result Value Ref Range    WBC 8.1 4.0 - 11.0 10e9/L    RBC Count 2.61 (L) 3.8 - 5.2 10e12/L    Hemoglobin 8.8 (L) 11.7 - 15.7 g/dL    Hematocrit 26.5 (L) 35.0 - 47.0 %     (H) 78 - 100 fl    MCH 33.7 (H) 26.5 - 33.0 pg    MCHC 33.2 31.5 - 36.5 g/dL    RDW 25.5 (H) 10.0 - 15.0 %    Platelet Count 58 (L) 150 - 450 10e9/L    Diff Method Automated Method     % Neutrophils 96.1 %    % Lymphocytes 2.1 %    % Monocytes 0.9 %    % Eosinophils 0.0 %    % Basophils 0.0 %    % Immature Granulocytes 0.9 %    Nucleated RBCs 0 0 /100    Absolute Neutrophil 7.8 1.6 - 8.3 10e9/L    Absolute Lymphocytes 0.2 (L) 0.8 - 5.3 10e9/L    Absolute Monocytes 0.1 0.0 - 1.3 10e9/L    Absolute Eosinophils 0.0 0.0 - 0.7 10e9/L    Absolute Basophils 0.0 0.0 - 0.2 10e9/L    Abs Immature Granulocytes 0.1 0 - 0.4 10e9/L    Absolute Nucleated RBC 0.0    Basic metabolic panel   Result Value Ref Range    Sodium 141 133 - 144 mmol/L    Potassium 3.6 3.4 - 5.3 mmol/L    Chloride 104 94 - 109 mmol/L    Carbon Dioxide 32 20 - 32 mmol/L    Anion Gap 6 3 - 14 mmol/L    Glucose 226 (H) 70 - 99 mg/dL    Urea Nitrogen 18 7 - 30 mg/dL    Creatinine 0.51 (L) 0.52 - 1.04 mg/dL    GFR Estimate >90 >60 mL/min/1.7m2    GFR Estimate If Black >90 >60 mL/min/1.7m2    Calcium 7.9 (L) 8.5 - 10.1 mg/dL   Magnesium   Result Value Ref Range    Magnesium 1.8 1.6 - 2.3 mg/dL   Phosphorus   Result Value Ref Range    Phosphorus 1.7 (L) 2.5 - 4.5 mg/dL   Methotrexate level   Result Value Ref Range    Methotrexate Level 98.94 umol/L   pH urine POCT   Result Value Ref Range    pH Urine 8.0 5.0 - 7.0 pH   Glucose by meter   Result Value Ref Range    Glucose  208 (H) 70 - 99 mg/dL     I have seen, interviewed, and examined the patient independently.  I have reviewed the vital signs and labs.  This note reflects my assessment and plan.    Exacerbation of chronic loose stools, will try more fiber (which has controlled it in the past)    Sugars elevated, will cover with SS    Edema and elevated BP likely due to fluid flushed for chemo. Will monitor both. No diuretics at this time but can add if needed in the future    Nany Chadwick MD/PhD

## 2017-09-08 NOTE — PLAN OF CARE
Problem: Goal Outcome Summary  Goal: Goal Outcome Summary  Outcome: No Change  Patient tolerated METHOTREXATE . Urine ph 7.5 and 8.5. next urine ph due noon.Please note leucovorin time will  need to be changed.Will need iv bag with sodium bicarb til pms. Denies pain. Afebrile. Bp elevated and atenolol  Given  Early.Wound on left arm dressed by WOC- no orders seen. CDI.Uses bedside commode

## 2017-09-08 NOTE — PROGRESS NOTES
Nursing Focus: Chemotherapy  D: Positive blood return via PICC. Insertion site is clean/dry/intact, dressing intact with no complaints of pain.  Urine output is recorded in intake in Doc Flowsheet.    I: Premedications given per order (see electronic medical administration record). Dose #1 of HD MTX infusing over 3 hours. Reviewed pt teaching on chemotherapy side effects.  Pt denies need for further teaching. Chemotherapy double checked per protocol by two chemotherapy competent RN's.   A: Tolerating procedure well. Denies nausea and or pain.   P: Continue to monitor urine output and symptoms of nausea. Screen for symptoms of toxicity.

## 2017-09-08 NOTE — PLAN OF CARE
Problem: Chemotherapy Effects (Adult)  Goal: Signs and Symptoms of Listed Potential Problems Will be Absent or Manageable (Chemotherapy Effects)  Signs and symptoms of listed potential problems will be absent or manageable by discharge/transition of care (reference Chemotherapy Effects (Adult) CPG). First HD MTX Pt will be able to eat 50% of meals, Have no falls and no +BC   Outcome: No Change  Bree doing well s/p Methotrexate last evening. Denies nausea and is eating well.  IVF at 190 and with good urine output.  Does have incontinence as a baseline. Urine pH at 1200 was 8.0.  Up walking halls with .  Plan to start Leucovorin at 2030 this evening.  Phos replaced IV.

## 2017-09-08 NOTE — PLAN OF CARE
Problem: Individualization  Goal: Patient Preferences  A&Ox4. AVSS. Denied pain. Denied nausea. Phos was 1.7 this morning, got replaced, recheck came back 2.0, pt needs more replacement,requested for med to come from pharmacy and will start phos replacement.Sodium bicarb is infusing at 190 cc/hr. Next urine ph check is at 8 pm. Uppto bed side commode and in [place] independently.Plan to start Leucovorin at 2030 this evening.  Addendum  Bs 262 before dinner, covered per sliding scale. Phos replacement is infusing at 62.5 cc/hr, next phos recheck is in the morning.

## 2017-09-09 LAB
ANION GAP SERPL CALCULATED.3IONS-SCNC: 7 MMOL/L (ref 3–14)
BASOPHILS # BLD AUTO: 0 10E9/L (ref 0–0.2)
BASOPHILS NFR BLD AUTO: 0 %
BUN SERPL-MCNC: 15 MG/DL (ref 7–30)
CA-I BLD-MCNC: 3.8 MG/DL (ref 4.4–5.2)
CALCIUM SERPL-MCNC: 6.7 MG/DL (ref 8.5–10.1)
CHLORIDE SERPL-SCNC: 109 MMOL/L (ref 94–109)
CO2 SERPL-SCNC: 30 MMOL/L (ref 20–32)
CREAT SERPL-MCNC: 0.4 MG/DL (ref 0.52–1.04)
DIFFERENTIAL METHOD BLD: ABNORMAL
EOSINOPHIL # BLD AUTO: 0 10E9/L (ref 0–0.7)
EOSINOPHIL NFR BLD AUTO: 0 %
ERYTHROCYTE [DISTWIDTH] IN BLOOD BY AUTOMATED COUNT: 26.8 % (ref 10–15)
GFR SERPL CREATININE-BSD FRML MDRD: >90 ML/MIN/1.7M2
GLUCOSE BLDC GLUCOMTR-MCNC: 163 MG/DL (ref 70–99)
GLUCOSE BLDC GLUCOMTR-MCNC: 188 MG/DL (ref 70–99)
GLUCOSE BLDC GLUCOMTR-MCNC: 193 MG/DL (ref 70–99)
GLUCOSE BLDC GLUCOMTR-MCNC: 213 MG/DL (ref 70–99)
GLUCOSE SERPL-MCNC: 165 MG/DL (ref 70–99)
HCT VFR BLD AUTO: 24.8 % (ref 35–47)
HGB BLD-MCNC: 7.9 G/DL (ref 11.7–15.7)
IMM GRANULOCYTES # BLD: 0 10E9/L (ref 0–0.4)
IMM GRANULOCYTES NFR BLD: 0.3 %
LYMPHOCYTES # BLD AUTO: 0.4 10E9/L (ref 0.8–5.3)
LYMPHOCYTES NFR BLD AUTO: 4.4 %
MAGNESIUM SERPL-MCNC: 1.9 MG/DL (ref 1.6–2.3)
MCH RBC QN AUTO: 33.1 PG (ref 26.5–33)
MCHC RBC AUTO-ENTMCNC: 31.9 G/DL (ref 31.5–36.5)
MCV RBC AUTO: 104 FL (ref 78–100)
MONOCYTES # BLD AUTO: 0.4 10E9/L (ref 0–1.3)
MONOCYTES NFR BLD AUTO: 4.8 %
MTX SERPL-SCNC: 0.52 UMOL/L
NEUTROPHILS # BLD AUTO: 8.1 10E9/L (ref 1.6–8.3)
NEUTROPHILS NFR BLD AUTO: 90.5 %
NRBC # BLD AUTO: 0 10*3/UL
NRBC BLD AUTO-RTO: 0 /100
PH UR STRIP: 7 PH (ref 5–7)
PH UR STRIP: 7.5 PH (ref 5–7)
PH UR STRIP: 8.5 PH (ref 5–7)
PHOSPHATE SERPL-MCNC: 2 MG/DL (ref 2.5–4.5)
PHOSPHATE SERPL-MCNC: 2.2 MG/DL (ref 2.5–4.5)
PLATELET # BLD AUTO: 62 10E9/L (ref 150–450)
POTASSIUM SERPL-SCNC: 3.2 MMOL/L (ref 3.4–5.3)
POTASSIUM SERPL-SCNC: 4.1 MMOL/L (ref 3.4–5.3)
RBC # BLD AUTO: 2.39 10E12/L (ref 3.8–5.2)
SODIUM SERPL-SCNC: 146 MMOL/L (ref 133–144)
WBC # BLD AUTO: 8.9 10E9/L (ref 4–11)

## 2017-09-09 PROCEDURE — 36592 COLLECT BLOOD FROM PICC: CPT | Performed by: PHYSICIAN ASSISTANT

## 2017-09-09 PROCEDURE — 25000128 H RX IP 250 OP 636: Performed by: INTERNAL MEDICINE

## 2017-09-09 PROCEDURE — 25000132 ZZH RX MED GY IP 250 OP 250 PS 637: Performed by: PHYSICIAN ASSISTANT

## 2017-09-09 PROCEDURE — 99233 SBSQ HOSP IP/OBS HIGH 50: CPT | Performed by: INTERNAL MEDICINE

## 2017-09-09 PROCEDURE — 25000132 ZZH RX MED GY IP 250 OP 250 PS 637: Performed by: INTERNAL MEDICINE

## 2017-09-09 PROCEDURE — 40000802 ZZH SITE CHECK

## 2017-09-09 PROCEDURE — 84100 ASSAY OF PHOSPHORUS: CPT | Performed by: INTERNAL MEDICINE

## 2017-09-09 PROCEDURE — 00000146 ZZHCL STATISTIC GLUCOSE BY METER IP

## 2017-09-09 PROCEDURE — 83735 ASSAY OF MAGNESIUM: CPT | Performed by: PHYSICIAN ASSISTANT

## 2017-09-09 PROCEDURE — 25000128 H RX IP 250 OP 636: Performed by: PHYSICIAN ASSISTANT

## 2017-09-09 PROCEDURE — 84132 ASSAY OF SERUM POTASSIUM: CPT | Performed by: INTERNAL MEDICINE

## 2017-09-09 PROCEDURE — 80299 QUANTITATIVE ASSAY DRUG: CPT | Performed by: PHYSICIAN ASSISTANT

## 2017-09-09 PROCEDURE — 25000131 ZZH RX MED GY IP 250 OP 636 PS 637: Performed by: INTERNAL MEDICINE

## 2017-09-09 PROCEDURE — 25000125 ZZHC RX 250: Performed by: PHYSICIAN ASSISTANT

## 2017-09-09 PROCEDURE — 85025 COMPLETE CBC W/AUTO DIFF WBC: CPT | Performed by: PHYSICIAN ASSISTANT

## 2017-09-09 PROCEDURE — 84100 ASSAY OF PHOSPHORUS: CPT | Performed by: PHYSICIAN ASSISTANT

## 2017-09-09 PROCEDURE — 12000008 ZZH R&B INTERMEDIATE UMMC

## 2017-09-09 PROCEDURE — 80048 BASIC METABOLIC PNL TOTAL CA: CPT | Performed by: PHYSICIAN ASSISTANT

## 2017-09-09 PROCEDURE — 82330 ASSAY OF CALCIUM: CPT | Performed by: PHYSICIAN ASSISTANT

## 2017-09-09 PROCEDURE — 40000558 ZZH STATISTIC CVC DRESSING CHANGE

## 2017-09-09 PROCEDURE — 81003 URINALYSIS AUTO W/O SCOPE: CPT | Performed by: INTERNAL MEDICINE

## 2017-09-09 PROCEDURE — 36415 COLL VENOUS BLD VENIPUNCTURE: CPT | Performed by: INTERNAL MEDICINE

## 2017-09-09 RX ORDER — POTASSIUM CHLORIDE 14.9 MG/ML
20 INJECTION INTRAVENOUS
Status: DISCONTINUED | OUTPATIENT
Start: 2017-09-09 | End: 2017-09-11 | Stop reason: HOSPADM

## 2017-09-09 RX ORDER — POTASSIUM CHLORIDE 750 MG/1
20-40 TABLET, EXTENDED RELEASE ORAL
Status: DISCONTINUED | OUTPATIENT
Start: 2017-09-09 | End: 2017-09-11 | Stop reason: HOSPADM

## 2017-09-09 RX ORDER — POTASSIUM CL/LIDO/0.9 % NACL 10MEQ/0.1L
10 INTRAVENOUS SOLUTION, PIGGYBACK (ML) INTRAVENOUS
Status: DISCONTINUED | OUTPATIENT
Start: 2017-09-09 | End: 2017-09-11 | Stop reason: HOSPADM

## 2017-09-09 RX ORDER — FUROSEMIDE 10 MG/ML
40 INJECTION INTRAMUSCULAR; INTRAVENOUS ONCE
Status: COMPLETED | OUTPATIENT
Start: 2017-09-09 | End: 2017-09-09

## 2017-09-09 RX ORDER — POTASSIUM CHLORIDE 7.45 MG/ML
10 INJECTION INTRAVENOUS
Status: DISCONTINUED | OUTPATIENT
Start: 2017-09-09 | End: 2017-09-11 | Stop reason: HOSPADM

## 2017-09-09 RX ORDER — TRAMADOL HYDROCHLORIDE 50 MG/1
50 TABLET ORAL EVERY 6 HOURS PRN
Status: DISCONTINUED | OUTPATIENT
Start: 2017-09-09 | End: 2017-09-11 | Stop reason: HOSPADM

## 2017-09-09 RX ORDER — POTASSIUM CHLORIDE 1.5 G/1.58G
20-40 POWDER, FOR SOLUTION ORAL
Status: DISCONTINUED | OUTPATIENT
Start: 2017-09-09 | End: 2017-09-11 | Stop reason: HOSPADM

## 2017-09-09 RX ADMIN — SODIUM BICARBONATE 650 MG TABLET 3900 MG: at 00:10

## 2017-09-09 RX ADMIN — GABAPENTIN 200 MG: 100 CAPSULE ORAL at 08:50

## 2017-09-09 RX ADMIN — SODIUM BICARBONATE 650 MG TABLET 3900 MG: at 23:48

## 2017-09-09 RX ADMIN — CALCIUM POLYCARBOPHIL 1250 MG: 625 TABLET, FILM COATED ORAL at 20:31

## 2017-09-09 RX ADMIN — LEUCOVORIN CALCIUM 25 MG: 350 INJECTION, POWDER, LYOPHILIZED, FOR SOLUTION INTRAMUSCULAR; INTRAVENOUS at 02:24

## 2017-09-09 RX ADMIN — SODIUM BICARBONATE 650 MG TABLET 3900 MG: at 08:50

## 2017-09-09 RX ADMIN — LEUCOVORIN CALCIUM 25 MG: 350 INJECTION, POWDER, LYOPHILIZED, FOR SOLUTION INTRAMUSCULAR; INTRAVENOUS at 14:15

## 2017-09-09 RX ADMIN — SODIUM BICARBONATE 650 MG TABLET 3900 MG: at 04:02

## 2017-09-09 RX ADMIN — Medication: at 20:30

## 2017-09-09 RX ADMIN — DEXAMETHASONE 8 MG: 4 TABLET ORAL at 08:50

## 2017-09-09 RX ADMIN — RANITIDINE HYDROCHLORIDE 150 MG: 150 TABLET, FILM COATED ORAL at 20:31

## 2017-09-09 RX ADMIN — Medication 2 TABLET: at 12:25

## 2017-09-09 RX ADMIN — LORAZEPAM 0.5 MG: 2 INJECTION INTRAMUSCULAR; INTRAVENOUS at 18:17

## 2017-09-09 RX ADMIN — ATENOLOL 25 MG: 25 TABLET ORAL at 08:50

## 2017-09-09 RX ADMIN — ACETAMINOPHEN 650 MG: 325 TABLET ORAL at 16:16

## 2017-09-09 RX ADMIN — CALCIUM POLYCARBOPHIL 1250 MG: 625 TABLET, FILM COATED ORAL at 08:50

## 2017-09-09 RX ADMIN — ACYCLOVIR 400 MG: 400 TABLET ORAL at 08:50

## 2017-09-09 RX ADMIN — POTASSIUM CHLORIDE 20 MEQ: 750 TABLET, EXTENDED RELEASE ORAL at 08:57

## 2017-09-09 RX ADMIN — RANITIDINE HYDROCHLORIDE 150 MG: 150 TABLET, FILM COATED ORAL at 08:50

## 2017-09-09 RX ADMIN — SODIUM CHLORIDE: 9 INJECTION, SOLUTION INTRAVENOUS at 14:55

## 2017-09-09 RX ADMIN — POTASSIUM PHOSPHATE, MONOBASIC AND POTASSIUM PHOSPHATE, DIBASIC 15 MMOL: 224; 236 INJECTION, SOLUTION INTRAVENOUS at 21:56

## 2017-09-09 RX ADMIN — PREDNISONE 5 MG: 5 TABLET ORAL at 08:50

## 2017-09-09 RX ADMIN — GABAPENTIN 200 MG: 100 CAPSULE ORAL at 14:15

## 2017-09-09 RX ADMIN — POTASSIUM PHOSPHATE, MONOBASIC AND POTASSIUM PHOSPHATE, DIBASIC 15 MMOL: 224; 236 INJECTION, SOLUTION INTRAVENOUS at 08:57

## 2017-09-09 RX ADMIN — SODIUM CHLORIDE: 9 INJECTION, SOLUTION INTRAVENOUS at 20:35

## 2017-09-09 RX ADMIN — FUROSEMIDE 40 MG: 10 INJECTION, SOLUTION INTRAVENOUS at 21:15

## 2017-09-09 RX ADMIN — ACYCLOVIR 400 MG: 400 TABLET ORAL at 20:32

## 2017-09-09 RX ADMIN — OXYCODONE HYDROCHLORIDE AND ACETAMINOPHEN 500 MG: 500 TABLET ORAL at 12:25

## 2017-09-09 RX ADMIN — ATENOLOL 25 MG: 25 TABLET ORAL at 20:31

## 2017-09-09 RX ADMIN — GABAPENTIN 200 MG: 100 CAPSULE ORAL at 20:31

## 2017-09-09 RX ADMIN — DIGOXIN 125 MCG: 125 TABLET ORAL at 08:50

## 2017-09-09 RX ADMIN — TRAMADOL HYDROCHLORIDE 50 MG: 50 TABLET, COATED ORAL at 20:32

## 2017-09-09 RX ADMIN — SODIUM BICARBONATE 650 MG TABLET 3900 MG: at 20:31

## 2017-09-09 RX ADMIN — SODIUM BICARBONATE 650 MG TABLET 3900 MG: at 16:16

## 2017-09-09 RX ADMIN — THIAMINE HCL (VITAMIN B1) 50 MG TABLET 50 MG: at 12:25

## 2017-09-09 RX ADMIN — SODIUM CHLORIDE: 9 INJECTION, SOLUTION INTRAVENOUS at 02:04

## 2017-09-09 RX ADMIN — MULTIPLE VITAMINS W/ MINERALS TAB 1 TABLET: TAB at 12:25

## 2017-09-09 RX ADMIN — POTASSIUM CHLORIDE 40 MEQ: 750 TABLET, EXTENDED RELEASE ORAL at 11:35

## 2017-09-09 RX ADMIN — POTASSIUM CHLORIDE 20 MEQ: 750 TABLET, EXTENDED RELEASE ORAL at 08:50

## 2017-09-09 RX ADMIN — LEUCOVORIN CALCIUM 25 MG: 350 INJECTION, POWDER, LYOPHILIZED, FOR SOLUTION INTRAMUSCULAR; INTRAVENOUS at 08:43

## 2017-09-09 RX ADMIN — SODIUM BICARBONATE 650 MG TABLET 3900 MG: at 12:25

## 2017-09-09 RX ADMIN — LEUCOVORIN CALCIUM 25 MG: 350 INJECTION, POWDER, LYOPHILIZED, FOR SOLUTION INTRAMUSCULAR; INTRAVENOUS at 20:34

## 2017-09-09 RX ADMIN — FUROSEMIDE 40 MG: 10 INJECTION, SOLUTION INTRAVENOUS at 11:37

## 2017-09-09 RX ADMIN — ALLOPURINOL 300 MG: 300 TABLET ORAL at 08:50

## 2017-09-09 ASSESSMENT — PAIN DESCRIPTION - DESCRIPTORS
DESCRIPTORS: ACHING
DESCRIPTORS: SHARP;SHOOTING;DISCOMFORT

## 2017-09-09 NOTE — PROGRESS NOTES
[facility], [place]    Hematology / Oncology Progress Note    Date of Service (when I saw the patient): 09/09/2017     Assessment & Plan   Amelia is a 56 year old female with h/o RA and new diagnosis of stage IVB DLBCL with high risk, double hit.  Presented with cardiac arrest and significant cytopenias. She received 1 dose of R-EPOCH and her current regimen is R-CHOP with HD MTX on D15 of Cycles 2, 4 and 6. She received cycle #2 of RCHOP on 8/23/17 and presents for admission for C2D15 of HD MTX (this is her first time receiving it). She had a negative LP on 8/23.       ONC:   #DLBCL, stage IVB, high risk:  Treated with R-EPOCH for cycle #1 but will move forward with R-CHOP (for five more cycles).  She will receive HD MTX inpatient on days 15 of cycle 2, 4 and 6 due to high IPI.  Platelet count in clinic on 9/6 was 64k so chemo was delayed until today. Plt count on admission (9/7) was 70k, approved by attending MD to proceed.   -cont allopurinol  - PICC placed on admission, will plan to d/c with PICC line as she has chemo again next week     REGIMEN: D1=9/7/17; Today is D3  MTX 5.25 g D1- Completed  Leucovorin 50 mg IV 24 hours after MTX, then 25 mg every 6 hours until MTX <0.1 (MTX level 0.52 today)  Premeds: zofran 16 mg D1, dex 12 mg D1, 8 mg D2 and D3  IVF with D5 + 0.45% NS with sodium bicarb 190/h         HEME:   #Anemia and thrombocytopenia 2/2 DLBCL and chemotherapy: presented with pancytopenia, worsened with chemo and now recovering.  No current need for transfusion support. Hgb is 7.9 today, she is quite fluid overloaded so largely dilutional. Will hold off on a transfusion since we are trying to avoid extra fluid.       ID:   # PPX: Continue ACV, hold levaquin and fluconazole while not neutropenic     CV:   #Afib: Currently in NSR. ECHO on 8/31/17- EF of 55-60%, does have mitral and tricuspid insufficiency.  Murmur noted on exam.   -Continue PTA digoxin and atenolol.  Sees  cardiology end of September      :   #Urinary incontinence: h/o saddle anesthesia and urinary incontinence at night mostly, wears depends.  Will need urology and urodynamic studies outpatient- sees next month.       GI:   #LFT elevation: likely related to liver involvement.  Continues to trend down.    #Frequent soft stools: increase PO fiber to 2 tabs bid and prn imodium       RHEUM:  #H/o RA: 30 + years of RA, long history of therapy with MTX, pred and HCQ.    - Continue PTA 5 mg pred daily.    - 9/15 f/up with Dr Cunha, rheumatology     DERM:   #LUE extravasation: This is from medications given during cardiac arrest. Per patient healing well. WOC RN consulted, recs appreciated. New recommendations: Left upper arm wounds every 5th day: Clean wound and skin around wound with microklenz.  Paint Cavilon no sting barrier to skin around wound.  Cut small pieces of Hydrofera Blue Ready (R[employer] #[id number]) and pack into wounds. This is an antibacterial foam dressing. Cover with small piece of Vaseline Gauze and two mepilex border dressings.    ENDO:  #Steroid induced hyperglycemia: fasting glucose on D2 was 226, due to PO dex and D5 in IVF. Better today with transition to NS IVF  - Accuchecks and medium intensity SSI; fasting glucose 165 today and if better this afternoon will d/c accuchecks    FEN:  Regular diet  Electrolyte replacement prn per protocol (goal K>4.0 with hx of heart arrhythmias)    #Fluid overload: Weight is up 8 kg since admission, swollen in her legs, chest and face, mild SOB with lying down. Unfortunately need to continue IVF at 190/hr with chemo regimen  - IV lasix 40 mg today, will follow I&O and repeat in the PM as needed    DVT Prophylaxis: Pt ambulating regularly, PCDs  Code Status: Full Code     Disposition: Expected discharge in 1-3 days once MTX level has cleared      Discussed with attending, Dr. Netta Enriquez PA-C  Hematology/Oncology    Interval History   She states  that she is feeling very swollen today, thighs, abd, chest and face. Weight up 8kg from admission. Some SOB with lying down. Urinating well. No CP. No fevers or chills. No headaches. No new mouth sores. No rashes.     Physical Exam   Temp: 97  F (36.1  C) Temp src: Oral BP: 160/63 Pulse: 78 Heart Rate: 76 Resp: 16 SpO2: 97 % O2 Device: None (Room air)    Vitals:    09/07/17 0911 09/08/17 0703 09/09/17 0749   Weight: 54.9 kg (121 lb) 56.8 kg (125 lb 4.8 oz) 62.1 kg (137 lb)     Vital Signs with Ranges  Temp:  [97  F (36.1  C)-98  F (36.7  C)] 97  F (36.1  C)  Pulse:  [77-78] 78  Heart Rate:  [71-90] 76  Resp:  [16-18] 16  BP: (126-160)/(54-65) 160/63  SpO2:  [95 %-97 %] 97 %  I/O last 3 completed shifts:  In: 2352.5 [P.O.:500; I.V.:1852.5]  Out: 2550 [Urine:2550]    Constitutional: Well appearing, no acute distress  Eyes:  sclera anicteric, conjunctiva clear  HEENT: normocephalic, atraumatic, alopecia, moist mucus membranes, no thrush, one small sore anterior lower gums  Respiratory: clear to auscultation bilaterally  Cardiovascular: RRR, +holosystolic murmur  GI: soft, nondistended, nontender  Skin: known wound on the left inner arm from previous extravasation, covered, will eval later when RN changes the dressing  Musculoskeletal: Swollen face and chest and upper thighs  Neurologic: A&O x 3, no focal deficits, CN II-XII intact  Psychiatric: appropriate affect    Medications     NaCl 190 mL/hr at 09/09/17 0204       furosemide  40 mg Intravenous Once     calcium polycarbophil  1,250 mg Oral BID     insulin aspart  1-7 Units Subcutaneous TID AC     insulin aspart  1-5 Units Subcutaneous At Bedtime     sodium bicarbonate  3,900 mg Oral Q4H     leucovorin  25 mg Intravenous Q6H     acyclovir  400 mg Oral BID     allopurinol  300 mg Oral Daily     ascorbic acid  500 mg Oral Daily     atenolol  25 mg Oral BID     calcium-vitamin D  2 tablet Oral QAM     gabapentin  200 mg Oral TID     multivitamin, therapeutic with  minerals  1 tablet Oral Daily     potassium chloride SA  20 mEq Oral Daily     predniSONE  5 mg Oral Daily     thiamine  50 mg Oral Daily     ranitidine  150 mg Oral BID     digoxin  125 mcg Oral Daily     sodium chloride (PF)  10 mL Intracatheter Q7 Days     sodium chloride (PF)  10 mL Intracatheter Q7 Days       Data   Results for orders placed or performed during the hospital encounter of 09/07/17 (from the past 24 hour(s))   pH urine POCT   Result Value Ref Range    pH Urine 8.0 5.0 - 7.0 pH   Glucose by meter   Result Value Ref Range    Glucose 208 (H) 70 - 99 mg/dL   Phosphorus   Result Value Ref Range    Phosphorus 2.0 (L) 2.5 - 4.5 mg/dL   Glucose by meter   Result Value Ref Range    Glucose 262 (H) 70 - 99 mg/dL   pH urine POCT   Result Value Ref Range    pH Urine 8.5 5.0 - 7.0 pH   Glucose by meter   Result Value Ref Range    Glucose 242 (H) 70 - 99 mg/dL   Glucose by meter   Result Value Ref Range    Glucose 188 (H) 70 - 99 mg/dL   pH urine POCT   Result Value Ref Range    pH Urine 8.5 5.0 - 7.0 pH   CBC with platelets differential   Result Value Ref Range    WBC 8.9 4.0 - 11.0 10e9/L    RBC Count 2.39 (L) 3.8 - 5.2 10e12/L    Hemoglobin 7.9 (L) 11.7 - 15.7 g/dL    Hematocrit 24.8 (L) 35.0 - 47.0 %     (H) 78 - 100 fl    MCH 33.1 (H) 26.5 - 33.0 pg    MCHC 31.9 31.5 - 36.5 g/dL    RDW 26.8 (H) 10.0 - 15.0 %    Platelet Count 62 (L) 150 - 450 10e9/L    Diff Method Automated Method     % Neutrophils 90.5 %    % Lymphocytes 4.4 %    % Monocytes 4.8 %    % Eosinophils 0.0 %    % Basophils 0.0 %    % Immature Granulocytes 0.3 %    Nucleated RBCs 0 0 /100    Absolute Neutrophil 8.1 1.6 - 8.3 10e9/L    Absolute Lymphocytes 0.4 (L) 0.8 - 5.3 10e9/L    Absolute Monocytes 0.4 0.0 - 1.3 10e9/L    Absolute Eosinophils 0.0 0.0 - 0.7 10e9/L    Absolute Basophils 0.0 0.0 - 0.2 10e9/L    Abs Immature Granulocytes 0.0 0 - 0.4 10e9/L    Absolute Nucleated RBC 0.0    Magnesium   Result Value Ref Range    Magnesium  1.9 1.6 - 2.3 mg/dL   Phosphorus   Result Value Ref Range    Phosphorus 2.0 (L) 2.5 - 4.5 mg/dL   Methotrexate level   Result Value Ref Range    Methotrexate Level 0.52 umol/L   Basic metabolic panel   Result Value Ref Range    Sodium 146 (H) 133 - 144 mmol/L    Potassium 3.2 (L) 3.4 - 5.3 mmol/L    Chloride 109 94 - 109 mmol/L    Carbon Dioxide 30 20 - 32 mmol/L    Anion Gap 7 3 - 14 mmol/L    Glucose 165 (H) 70 - 99 mg/dL    Urea Nitrogen 15 7 - 30 mg/dL    Creatinine 0.40 (L) 0.52 - 1.04 mg/dL    GFR Estimate >90 >60 mL/min/1.7m2    GFR Estimate If Black >90 >60 mL/min/1.7m2    Calcium 6.7 (L) 8.5 - 10.1 mg/dL   Calcium ionized whole blood   Result Value Ref Range    Calcium Ionized Whole Blood 3.8 (L) 4.4 - 5.2 mg/dL     I have seen, interviewed, and examined the patient independently.  I have reviewed the vital signs and labs.  This note reflects my assessment and plan.    Amelia feels low energy today, attributed to feeling very bloated. 1 dose lasix given, will reassess later in afternoon and consider repeating a dose. She will try to eat and ambulate later, not feeling well now due to bloating    Nany Chadwick MD/PhD

## 2017-09-09 NOTE — PLAN OF CARE
Problem: Chemotherapy Effects (Adult)  Goal: Signs and Symptoms of Listed Potential Problems Will be Absent or Manageable (Chemotherapy Effects)  Signs and symptoms of listed potential problems will be absent or manageable by discharge/transition of care (reference Chemotherapy Effects (Adult) CPG). First HD MTX Pt will be able to eat 50% of meals, Have no falls and no +BC   Outcome: No Change  Bree said appetite has diminished but has been able to eat meals - denies nausea.  Weight increased today and face and chest are swollen.  Received 40mg of Lasix and has had a good response.  Urine pH at 1230 was 7.5.  Leucovorin every 6 hours as ordered. MTX level 0.52 this am.   Pt developed 2 hives on inside of left hand.  Question if that was from lotion after a hand massage.  Ice to area and it resolved.

## 2017-09-09 NOTE — PLAN OF CARE
"Problem: Goal Outcome Summary  Goal: Goal Outcome Summary  Outcome: No Change  /54 (BP Location: Left leg)  Pulse 78  Temp 97.8  F (36.6  C) (Oral)  Resp 16  Ht 1.473 m (4' 10\")  Wt 56.8 kg (125 lb 4.8 oz)  SpO2 97%  BMI 26.19 kg/m2     VSS on RA; afebrile. Denies pain or nausea but did request magic mouth wash for beginnings of a mouth sore on bottom of inner lip. , covered per MAR. Urine pH 8.5; MD notified and no intervention ordered. Next pH to be assessed at 0400.  Phos infusing to replace level of 2.0, scheduled recheck with AM labs. Double lumen PICC infusing phos with NS at TKO, and NS at 190 mL/hr (switched from sodium bicarb IV and transitioned to oral). Tolerating regular diet. Up to commode x2 with clear yellow urine. No BM this shift. L arm wound dressed and covered, CDI. Up independently walking around unit. Continue with POC and notify MD with changes or concerns.          "

## 2017-09-09 NOTE — PLAN OF CARE
Problem: Goal Outcome Summary  Goal: Goal Outcome Summary  Outcome: No Change  Tolerating chemo regimen of HD MTX thus far. Urine ph within parameters for hd MTX and done at 0400. Patients 0200 blood sugar 188- Pt questioned need for 0200 b.s.  But reiterated policy.Patient hoping to be discharged soon.Patient received phos iv for replacement. Check with am labs.Began leucovorin rescue on pm shift

## 2017-09-10 ENCOUNTER — APPOINTMENT (OUTPATIENT)
Dept: ULTRASOUND IMAGING | Facility: CLINIC | Age: 57
DRG: 847 | End: 2017-09-10
Attending: PHYSICIAN ASSISTANT
Payer: COMMERCIAL

## 2017-09-10 ENCOUNTER — APPOINTMENT (OUTPATIENT)
Dept: CT IMAGING | Facility: CLINIC | Age: 57
DRG: 847 | End: 2017-09-10
Attending: PHYSICIAN ASSISTANT
Payer: COMMERCIAL

## 2017-09-10 LAB
ANION GAP SERPL CALCULATED.3IONS-SCNC: 7 MMOL/L (ref 3–14)
ANION GAP SERPL CALCULATED.3IONS-SCNC: 7 MMOL/L (ref 3–14)
BASOPHILS # BLD AUTO: 0 10E9/L (ref 0–0.2)
BASOPHILS NFR BLD AUTO: 0 %
BUN SERPL-MCNC: 25 MG/DL (ref 7–30)
BUN SERPL-MCNC: 25 MG/DL (ref 7–30)
CALCIUM SERPL-MCNC: 6.4 MG/DL (ref 8.5–10.1)
CALCIUM SERPL-MCNC: 7.8 MG/DL (ref 8.5–10.1)
CHLORIDE SERPL-SCNC: 104 MMOL/L (ref 94–109)
CHLORIDE SERPL-SCNC: 108 MMOL/L (ref 94–109)
CO2 SERPL-SCNC: 30 MMOL/L (ref 20–32)
CO2 SERPL-SCNC: 33 MMOL/L (ref 20–32)
CREAT SERPL-MCNC: 0.47 MG/DL (ref 0.52–1.04)
CREAT SERPL-MCNC: 0.52 MG/DL (ref 0.52–1.04)
DIFFERENTIAL METHOD BLD: ABNORMAL
DIGOXIN SERPL-MCNC: 0.8 UG/L (ref 0.5–2)
EOSINOPHIL # BLD AUTO: 0 10E9/L (ref 0–0.7)
EOSINOPHIL NFR BLD AUTO: 0.1 %
ERYTHROCYTE [DISTWIDTH] IN BLOOD BY AUTOMATED COUNT: 27.9 % (ref 10–15)
GFR SERPL CREATININE-BSD FRML MDRD: >90 ML/MIN/1.7M2
GFR SERPL CREATININE-BSD FRML MDRD: >90 ML/MIN/1.7M2
GLUCOSE BLDC GLUCOMTR-MCNC: 142 MG/DL (ref 70–99)
GLUCOSE BLDC GLUCOMTR-MCNC: 176 MG/DL (ref 70–99)
GLUCOSE SERPL-MCNC: 144 MG/DL (ref 70–99)
GLUCOSE SERPL-MCNC: 150 MG/DL (ref 70–99)
HCT VFR BLD AUTO: 23.4 % (ref 35–47)
HGB BLD-MCNC: 7.5 G/DL (ref 11.7–15.7)
IMM GRANULOCYTES # BLD: 0 10E9/L (ref 0–0.4)
IMM GRANULOCYTES NFR BLD: 0.4 %
LYMPHOCYTES # BLD AUTO: 0.3 10E9/L (ref 0.8–5.3)
LYMPHOCYTES NFR BLD AUTO: 2.7 %
MAGNESIUM SERPL-MCNC: 1.6 MG/DL (ref 1.6–2.3)
MCH RBC QN AUTO: 33.9 PG (ref 26.5–33)
MCHC RBC AUTO-ENTMCNC: 32.1 G/DL (ref 31.5–36.5)
MCV RBC AUTO: 106 FL (ref 78–100)
MONOCYTES # BLD AUTO: 0.2 10E9/L (ref 0–1.3)
MONOCYTES NFR BLD AUTO: 2 %
MTX SERPL-SCNC: 0.11 UMOL/L
NEUTROPHILS # BLD AUTO: 9.3 10E9/L (ref 1.6–8.3)
NEUTROPHILS NFR BLD AUTO: 94.8 %
NRBC # BLD AUTO: 0 10*3/UL
NRBC BLD AUTO-RTO: 0 /100
PH UR STRIP: 7 PH (ref 5–7)
PH UR STRIP: 7.5 PH (ref 5–7)
PH UR STRIP: 8.5 PH (ref 5–7)
PHOSPHATE SERPL-MCNC: 2.3 MG/DL (ref 2.5–4.5)
PHOSPHATE SERPL-MCNC: 2.6 MG/DL (ref 2.5–4.5)
PLATELET # BLD AUTO: 54 10E9/L (ref 150–450)
POTASSIUM SERPL-SCNC: 3.7 MMOL/L (ref 3.4–5.3)
POTASSIUM SERPL-SCNC: 3.9 MMOL/L (ref 3.4–5.3)
RADIOLOGIST FLAGS: ABNORMAL
RBC # BLD AUTO: 2.21 10E12/L (ref 3.8–5.2)
SODIUM SERPL-SCNC: 143 MMOL/L (ref 133–144)
SODIUM SERPL-SCNC: 144 MMOL/L (ref 133–144)
TROPONIN I SERPL-MCNC: 0.04 UG/L (ref 0–0.04)
WBC # BLD AUTO: 9.8 10E9/L (ref 4–11)

## 2017-09-10 PROCEDURE — 25000128 H RX IP 250 OP 636: Performed by: PHYSICIAN ASSISTANT

## 2017-09-10 PROCEDURE — 25000132 ZZH RX MED GY IP 250 OP 250 PS 637: Performed by: INTERNAL MEDICINE

## 2017-09-10 PROCEDURE — 93010 ELECTROCARDIOGRAM REPORT: CPT | Performed by: INTERNAL MEDICINE

## 2017-09-10 PROCEDURE — 00000146 ZZHCL STATISTIC GLUCOSE BY METER IP

## 2017-09-10 PROCEDURE — 25000125 ZZHC RX 250: Performed by: PHYSICIAN ASSISTANT

## 2017-09-10 PROCEDURE — 80299 QUANTITATIVE ASSAY DRUG: CPT | Performed by: PHYSICIAN ASSISTANT

## 2017-09-10 PROCEDURE — 81003 URINALYSIS AUTO W/O SCOPE: CPT | Performed by: INTERNAL MEDICINE

## 2017-09-10 PROCEDURE — 25000128 H RX IP 250 OP 636: Performed by: STUDENT IN AN ORGANIZED HEALTH CARE EDUCATION/TRAINING PROGRAM

## 2017-09-10 PROCEDURE — 99233 SBSQ HOSP IP/OBS HIGH 50: CPT | Performed by: INTERNAL MEDICINE

## 2017-09-10 PROCEDURE — 25000128 H RX IP 250 OP 636: Performed by: INTERNAL MEDICINE

## 2017-09-10 PROCEDURE — 80048 BASIC METABOLIC PNL TOTAL CA: CPT | Performed by: PHYSICIAN ASSISTANT

## 2017-09-10 PROCEDURE — 25000132 ZZH RX MED GY IP 250 OP 250 PS 637: Performed by: PHYSICIAN ASSISTANT

## 2017-09-10 PROCEDURE — 84100 ASSAY OF PHOSPHORUS: CPT | Performed by: PHYSICIAN ASSISTANT

## 2017-09-10 PROCEDURE — 80048 BASIC METABOLIC PNL TOTAL CA: CPT | Performed by: INTERNAL MEDICINE

## 2017-09-10 PROCEDURE — 80162 ASSAY OF DIGOXIN TOTAL: CPT | Performed by: PHYSICIAN ASSISTANT

## 2017-09-10 PROCEDURE — 36592 COLLECT BLOOD FROM PICC: CPT | Performed by: PHYSICIAN ASSISTANT

## 2017-09-10 PROCEDURE — 93005 ELECTROCARDIOGRAM TRACING: CPT

## 2017-09-10 PROCEDURE — 84484 ASSAY OF TROPONIN QUANT: CPT | Performed by: PHYSICIAN ASSISTANT

## 2017-09-10 PROCEDURE — 93971 EXTREMITY STUDY: CPT | Mod: RT

## 2017-09-10 PROCEDURE — 12000008 ZZH R&B INTERMEDIATE UMMC

## 2017-09-10 PROCEDURE — 83735 ASSAY OF MAGNESIUM: CPT | Performed by: PHYSICIAN ASSISTANT

## 2017-09-10 PROCEDURE — 25000125 ZZHC RX 250: Performed by: STUDENT IN AN ORGANIZED HEALTH CARE EDUCATION/TRAINING PROGRAM

## 2017-09-10 PROCEDURE — 85025 COMPLETE CBC W/AUTO DIFF WBC: CPT | Performed by: PHYSICIAN ASSISTANT

## 2017-09-10 PROCEDURE — 71260 CT THORAX DX C+: CPT

## 2017-09-10 RX ORDER — FUROSEMIDE 10 MG/ML
40 INJECTION INTRAMUSCULAR; INTRAVENOUS ONCE
Status: COMPLETED | OUTPATIENT
Start: 2017-09-10 | End: 2017-09-10

## 2017-09-10 RX ORDER — SODIUM CHLORIDE 9 MG/ML
INJECTION, SOLUTION INTRAVENOUS CONTINUOUS
Status: DISCONTINUED | OUTPATIENT
Start: 2017-09-10 | End: 2017-09-10

## 2017-09-10 RX ORDER — FUROSEMIDE 10 MG/ML
40 INJECTION INTRAMUSCULAR; INTRAVENOUS EVERY 4 HOURS
Status: DISCONTINUED | OUTPATIENT
Start: 2017-09-10 | End: 2017-09-10

## 2017-09-10 RX ORDER — POTASSIUM CHLORIDE 750 MG/1
20 TABLET, EXTENDED RELEASE ORAL 2 TIMES DAILY
Status: COMPLETED | OUTPATIENT
Start: 2017-09-10 | End: 2017-09-10

## 2017-09-10 RX ORDER — FUROSEMIDE 10 MG/ML
60 INJECTION INTRAMUSCULAR; INTRAVENOUS EVERY 4 HOURS
Status: DISCONTINUED | OUTPATIENT
Start: 2017-09-10 | End: 2017-09-10

## 2017-09-10 RX ORDER — IOPAMIDOL 755 MG/ML
54 INJECTION, SOLUTION INTRAVASCULAR ONCE
Status: COMPLETED | OUTPATIENT
Start: 2017-09-10 | End: 2017-09-10

## 2017-09-10 RX ADMIN — GABAPENTIN 200 MG: 100 CAPSULE ORAL at 08:00

## 2017-09-10 RX ADMIN — ENOXAPARIN SODIUM 70 MG: 80 INJECTION SUBCUTANEOUS at 19:03

## 2017-09-10 RX ADMIN — POTASSIUM CHLORIDE 20 MEQ: 750 TABLET, EXTENDED RELEASE ORAL at 08:01

## 2017-09-10 RX ADMIN — POTASSIUM CHLORIDE 20 MEQ: 750 TABLET, EXTENDED RELEASE ORAL at 13:59

## 2017-09-10 RX ADMIN — LEUCOVORIN CALCIUM 25 MG: 350 INJECTION, POWDER, LYOPHILIZED, FOR SOLUTION INTRAMUSCULAR; INTRAVENOUS at 02:15

## 2017-09-10 RX ADMIN — FUROSEMIDE 40 MG: 10 INJECTION, SOLUTION INTRAVENOUS at 08:10

## 2017-09-10 RX ADMIN — ALLOPURINOL 300 MG: 300 TABLET ORAL at 08:00

## 2017-09-10 RX ADMIN — ATENOLOL 25 MG: 25 TABLET ORAL at 08:00

## 2017-09-10 RX ADMIN — GABAPENTIN 200 MG: 100 CAPSULE ORAL at 13:59

## 2017-09-10 RX ADMIN — SODIUM BICARBONATE 650 MG TABLET 3900 MG: at 08:09

## 2017-09-10 RX ADMIN — LEUCOVORIN CALCIUM 25 MG: 350 INJECTION, POWDER, LYOPHILIZED, FOR SOLUTION INTRAMUSCULAR; INTRAVENOUS at 21:02

## 2017-09-10 RX ADMIN — SODIUM CHLORIDE, PRESERVATIVE FREE 85 ML: 5 INJECTION INTRAVENOUS at 12:13

## 2017-09-10 RX ADMIN — LEUCOVORIN CALCIUM 25 MG: 350 INJECTION, POWDER, LYOPHILIZED, FOR SOLUTION INTRAMUSCULAR; INTRAVENOUS at 13:59

## 2017-09-10 RX ADMIN — DIGOXIN 125 MCG: 125 TABLET ORAL at 08:00

## 2017-09-10 RX ADMIN — FUROSEMIDE 40 MG: 10 INJECTION, SOLUTION INTRAVENOUS at 19:03

## 2017-09-10 RX ADMIN — SODIUM BICARBONATE 650 MG TABLET 3900 MG: at 04:08

## 2017-09-10 RX ADMIN — PREDNISONE 5 MG: 5 TABLET ORAL at 08:00

## 2017-09-10 RX ADMIN — CALCIUM CHLORIDE 1 G: 100 INJECTION, SOLUTION INTRAVENOUS at 09:34

## 2017-09-10 RX ADMIN — ACYCLOVIR 400 MG: 400 TABLET ORAL at 08:00

## 2017-09-10 RX ADMIN — CALCIUM POLYCARBOPHIL 1250 MG: 625 TABLET, FILM COATED ORAL at 19:04

## 2017-09-10 RX ADMIN — POTASSIUM PHOSPHATE, MONOBASIC AND POTASSIUM PHOSPHATE, DIBASIC 15 MMOL: 224; 236 INJECTION, SOLUTION INTRAVENOUS at 09:34

## 2017-09-10 RX ADMIN — RANITIDINE HYDROCHLORIDE 150 MG: 150 TABLET, FILM COATED ORAL at 08:00

## 2017-09-10 RX ADMIN — OXYCODONE HYDROCHLORIDE AND ACETAMINOPHEN 500 MG: 500 TABLET ORAL at 13:06

## 2017-09-10 RX ADMIN — SODIUM BICARBONATE 650 MG TABLET 3900 MG: at 13:06

## 2017-09-10 RX ADMIN — SODIUM BICARBONATE 650 MG TABLET 3900 MG: at 19:04

## 2017-09-10 RX ADMIN — RANITIDINE HYDROCHLORIDE 150 MG: 150 TABLET, FILM COATED ORAL at 19:04

## 2017-09-10 RX ADMIN — IOPAMIDOL 54 ML: 755 INJECTION, SOLUTION INTRAVENOUS at 12:11

## 2017-09-10 RX ADMIN — THIAMINE HCL (VITAMIN B1) 50 MG TABLET 50 MG: at 13:06

## 2017-09-10 RX ADMIN — GABAPENTIN 200 MG: 100 CAPSULE ORAL at 19:04

## 2017-09-10 RX ADMIN — SODIUM CHLORIDE: 9 INJECTION, SOLUTION INTRAVENOUS at 02:16

## 2017-09-10 RX ADMIN — Medication 2 TABLET: at 13:06

## 2017-09-10 RX ADMIN — SODIUM BICARBONATE 650 MG TABLET 3900 MG: at 16:15

## 2017-09-10 RX ADMIN — LEUCOVORIN CALCIUM 25 MG: 350 INJECTION, POWDER, LYOPHILIZED, FOR SOLUTION INTRAMUSCULAR; INTRAVENOUS at 07:52

## 2017-09-10 RX ADMIN — MULTIPLE VITAMINS W/ MINERALS TAB 1 TABLET: TAB at 13:06

## 2017-09-10 RX ADMIN — ATENOLOL 25 MG: 25 TABLET ORAL at 19:04

## 2017-09-10 RX ADMIN — ACYCLOVIR 400 MG: 400 TABLET ORAL at 19:04

## 2017-09-10 RX ADMIN — POTASSIUM CHLORIDE 20 MEQ: 750 TABLET, EXTENDED RELEASE ORAL at 16:15

## 2017-09-10 RX ADMIN — CALCIUM POLYCARBOPHIL 1250 MG: 625 TABLET, FILM COATED ORAL at 08:00

## 2017-09-10 RX ADMIN — FUROSEMIDE 40 MG: 10 INJECTION, SOLUTION INTRAVENOUS at 14:00

## 2017-09-10 RX ADMIN — POTASSIUM CHLORIDE 20 MEQ: 750 TABLET, EXTENDED RELEASE ORAL at 10:58

## 2017-09-10 ASSESSMENT — PAIN DESCRIPTION - DESCRIPTORS: DESCRIPTORS: ACHING

## 2017-09-10 NOTE — PROGRESS NOTES
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  Presented with cardiac arrest and significant cytopenias. She received 1 dose of R-EPOCH and her current regimen is R-CHOP with HD MTX on D15 of Cycles 2, 4 and 6. She received cycle #2 of RCHOP on 8/23/17 and presents for admission for C2D15 of HD MTX (this is her first time receiving it). She had a negative LP on 8/23.       ONC:   #DLBCL, stage IVB, high risk:  Treated with R-EPOCH for cycle #1 but will move forward with R-CHOP (for five more cycles).  She will receive HD MTX inpatient on days 15 of cycle 2, 4 and 6 due to high IPI.  Platelet count in clinic on 9/6 was 64k so chemo was delayed until today. Plt count on admission (9/7) was 70k, approved by attending MD to proceed.   -cont allopurinol  - PICC placed on admission, will plan to d/c with PICC line as she has chemo again next week     REGIMEN: D1=9/7/17; Today is D4  MTX 5.25 g D1- Completed  Leucovorin 50 mg IV 24 hours after MTX, then 25 mg every 6 hours until MTX <0.1 (MTX level 0.11 today). Continue leucovorin  Premeds: zofran 16 mg D1, dex 12 mg D1, 8 mg D2 and D3         HEME:   #Anemia and thrombocytopenia 2/2 DLBCL and chemotherapy: presented with pancytopenia, worsened with chemo and now recovering.  No current need for transfusion support. Hgb is 7.5 today, she is quite fluid overloaded so largely dilutional. Will hold off on a transfusion since we are trying to avoid extra fluid.       ID:   # PPX: Continue ACV, hold levaquin and fluconazole while not neutropenic     CV:   #Afib: Currently in NSR. ECHO on 8/31/17- EF of 55-60%, does have mitral and tricuspid insufficiency.  Murmur noted on exam.   -Continue PTA digoxin and atenolol.  Sees cardiology end of  September      :   #Urinary incontinence: h/o saddle anesthesia and urinary incontinence at night mostly, wears depends.  Will need urology and urodynamic studies outpatient- sees next month.       GI:   #LFT elevation: likely related to liver involvement.  Continues to trend down.    #Frequent soft stools: increase PO fiber to 2 tabs bid and prn imodium       RHEUM:  #H/o RA: 30 + years of RA, long history of therapy with MTX, pred and HCQ.    - Continue PTA 5 mg pred daily.    - 9/15 f/up with Dr Cunha, rheumatology     DERM:   #LUE extravasation: This is from medications given during cardiac arrest. Per patient healing well. WOC RN consulted, recs appreciated. New recommendations: Left upper arm wounds every 5th day: Clean wound and skin around wound with microklenz.  Paint Cavilon no sting barrier to skin around wound.  Cut small pieces of Hydrofera Blue Ready (Plover #[id number]) and pack into wounds. This is an antibacterial foam dressing. Cover with small piece of Vaseline Gauze and two mepilex border dressings.    ENDO:  #Steroid induced hyperglycemia: fasting glucose on D2 was 226, due to PO dex and D5 in IVF. Better today with transition to NS IVF  - Accuchecks and medium intensity SSI; fasting glucose 144 today. All glucose <180 in the past 24 hours. Will d/c Accuchecks and SSI insulin today.     #Fluid overload: Weight is up 10 kg since admission, swollen in her legs, chest and face. Had good UOP with lasix 40 mg bid on 9/9 but weight up further as IVF intake after MTX is still high. Has hx pulm HTN which is likely the cause. Needed to be on lasix gtt with R-EPOCH.   - Lasix 40 mg iV this AM, 60 mg IV in 4-6 hours  - Recheck BMP later this afternoon and if stable and still swollen will give more lasix this evening  - DC IVF due to fluid overload and MTX level close to goal. Will need to watch creatinine closely due to stopping IVF and MTX level and getting a CT with contrast   - If able to d/c  tomorrow will plan to d/c with PO lasix daily until back to baseline weight with daily potassium (increase PTA potassium to 40 meq while on lasix)    #SOB: Not hypoxic. No crackles. Not clear that the SOB is definitely from fluid. EKG normal. Trop negative.   - CT chest PE study pending  - Echo pending  - Repeat trop q4h x 2    #Right arm swelling: Possibly just due to fluid overload but with PICC in that arm will check and u/s to r/o DVT    FEN:  Regular diet  Electrolyte replacement prn per protocol (goal K>4.0 with hx of heart arrhythmias)      DVT Prophylaxis: Pt ambulating regularly, PCDs  Code Status: Full Code     Disposition: Expected discharge in 1-2 days once MTX level has cleared and swelling is improved     Discussed with attending, Dr. Netta Enriquez PA-C  Hematology/Oncology    Interval History   She states that she is feeling very swollen today, thighs, abd, chest and face. Worse than yesterday despite good UOP with lasix. Weight up 10kg from admission. Some SOB with lying down, better after lasix. Urinating well. No CP. No fevers or chills. No headaches. No new mouth sores. No rashes.     Physical Exam   Temp: 97.8  F (36.6  C) Temp src: Oral BP: 170/85 (activity)   Heart Rate: 69 Resp: 20 SpO2: 100 % O2 Device: Nasal cannula Oxygen Delivery: 2 LPM  Vitals:    09/08/17 0703 09/09/17 0749 09/10/17 0730   Weight: 56.8 kg (125 lb 4.8 oz) 62.1 kg (137 lb) 65.5 kg (144 lb 6.4 oz)     Vital Signs with Ranges  Temp:  [95.7  F (35.4  C)-98.3  F (36.8  C)] 97.8  F (36.6  C)  Heart Rate:  [66-78] 69  Resp:  [18-20] 20  BP: (137-170)/(59-85) 170/85  SpO2:  [96 %-100 %] 100 %  I/O last 3 completed shifts:  In: 5600 [P.O.:720; I.V.:4880]  Out: 4350 [Urine:4350]    Constitutional: Well appearing, no acute distress  Eyes:  sclera anicteric, conjunctiva clear  HEENT: normocephalic, atraumatic, alopecia, moist mucus membranes, no thrush, one small sore anterior lower gums  Respiratory: clear to  auscultation bilaterally  Cardiovascular: RRR, +holosystolic murmur  GI: soft, nondistended, nontender  Skin: known wound on the left inner arm from previous extravasation, covered, will eval later when RN changes the dressing  Musculoskeletal: Swollen face and chest and upper thighs  Neurologic: A&O x 3, no focal deficits, CN II-XII intact  Psychiatric: appropriate affect    Medications        potassium chloride  20 mEq Oral BID     furosemide  40 mg Intravenous Once     calcium polycarbophil  1,250 mg Oral BID     insulin aspart  1-7 Units Subcutaneous TID AC     insulin aspart  1-5 Units Subcutaneous At Bedtime     sodium bicarbonate  3,900 mg Oral Q4H     leucovorin  25 mg Intravenous Q6H     acyclovir  400 mg Oral BID     allopurinol  300 mg Oral Daily     ascorbic acid  500 mg Oral Daily     atenolol  25 mg Oral BID     calcium-vitamin D  2 tablet Oral QAM     gabapentin  200 mg Oral TID     multivitamin, therapeutic with minerals  1 tablet Oral Daily     potassium chloride SA  20 mEq Oral Daily     predniSONE  5 mg Oral Daily     thiamine  50 mg Oral Daily     ranitidine  150 mg Oral BID     digoxin  125 mcg Oral Daily     sodium chloride (PF)  10 mL Intracatheter Q7 Days     sodium chloride (PF)  10 mL Intracatheter Q7 Days       Data   Results for orders placed or performed during the hospital encounter of 09/07/17 (from the past 24 hour(s))   pH urine POCT   Result Value Ref Range    pH Urine 7.5 5.0 - 7.0 pH   Glucose by meter   Result Value Ref Range    Glucose 163 (H) 70 - 99 mg/dL   Potassium   Result Value Ref Range    Potassium 4.1 3.4 - 5.3 mmol/L   Phosphorus   Result Value Ref Range    Phosphorus 2.2 (L) 2.5 - 4.5 mg/dL   Glucose by meter   Result Value Ref Range    Glucose 213 (H) 70 - 99 mg/dL   pH urine POCT   Result Value Ref Range    pH Urine 7.0 5.0 - 7.0 pH   Glucose by meter   Result Value Ref Range    Glucose 193 (H) 70 - 99 mg/dL   Glucose by meter   Result Value Ref Range    Glucose 176  (H) 70 - 99 mg/dL   pH urine POCT   Result Value Ref Range    pH Urine 7.0 5.0 - 7.0 pH   CBC with platelets differential   Result Value Ref Range    WBC 9.8 4.0 - 11.0 10e9/L    RBC Count 2.21 (L) 3.8 - 5.2 10e12/L    Hemoglobin 7.5 (L) 11.7 - 15.7 g/dL    Hematocrit 23.4 (L) 35.0 - 47.0 %     (H) 78 - 100 fl    MCH 33.9 (H) 26.5 - 33.0 pg    MCHC 32.1 31.5 - 36.5 g/dL    RDW 27.9 (H) 10.0 - 15.0 %    Platelet Count 54 (L) 150 - 450 10e9/L    Diff Method Automated Method     % Neutrophils 94.8 %    % Lymphocytes 2.7 %    % Monocytes 2.0 %    % Eosinophils 0.1 %    % Basophils 0.0 %    % Immature Granulocytes 0.4 %    Nucleated RBCs 0 0 /100    Absolute Neutrophil 9.3 (H) 1.6 - 8.3 10e9/L    Absolute Lymphocytes 0.3 (L) 0.8 - 5.3 10e9/L    Absolute Monocytes 0.2 0.0 - 1.3 10e9/L    Absolute Eosinophils 0.0 0.0 - 0.7 10e9/L    Absolute Basophils 0.0 0.0 - 0.2 10e9/L    Abs Immature Granulocytes 0.0 0 - 0.4 10e9/L    Absolute Nucleated RBC 0.0    Magnesium   Result Value Ref Range    Magnesium 1.6 1.6 - 2.3 mg/dL   Phosphorus   Result Value Ref Range    Phosphorus 2.3 (L) 2.5 - 4.5 mg/dL   Methotrexate level   Result Value Ref Range    Methotrexate Level 0.11 umol/L   Basic metabolic panel   Result Value Ref Range    Sodium 144 133 - 144 mmol/L    Potassium 3.7 3.4 - 5.3 mmol/L    Chloride 108 94 - 109 mmol/L    Carbon Dioxide 30 20 - 32 mmol/L    Anion Gap 7 3 - 14 mmol/L    Glucose 144 (H) 70 - 99 mg/dL    Urea Nitrogen 25 7 - 30 mg/dL    Creatinine 0.47 (L) 0.52 - 1.04 mg/dL    GFR Estimate >90 >60 mL/min/1.7m2    GFR Estimate If Black >90 >60 mL/min/1.7m2    Calcium 6.4 (L) 8.5 - 10.1 mg/dL   EKG 12-lead, complete   Result Value Ref Range    Interpretation ECG Click View Image link to view waveform and result    Troponin I   Result Value Ref Range    Troponin I ES 0.042 0.000 - 0.045 ug/L   pH urine POCT   Result Value Ref Range    pH Urine 7.5 5.0 - 7.0 pH     I have seen, interviewed, and examined the  patient independently.  I have reviewed the vital signs and labs.  This note reflects my assessment and plan.    Amelia now feels very fluid overloaded. This tends to happen to her, however, it is fairly severe, she is on O2 and feels she can not breathe easily.    Lungs sound clear.    Given lack of crackles on exam with severe symptoms, I am concerned she may have an alternate reason for SOB:    1. R/o ACS: EKG: I reviewed this in depth and compared with other EKGs. Interpretation printed on EKG is incorrect. QRS complex is too narrow: this is not a junctional rhythm. I do not see evidence of new ST abnormalities in comparison to prior EKGs of hers (when her rhythm was normal). Troponin drawn and is normal. We will cycle troponins.    2. R/o CHF: she did receive recent anthracycline. Accumulated dose is not high, but toxicities have been seen acutely. Will get echo today    3. R/o PE: CT PA done.    4. Overload: will continue to diurese given that this remains the highest likelihood but will moderate aggressiveness of this given IV contrast dye load and MTX level remains 0.11 today    5. MTX 0.11: cont leucovorin, but will stop IVF.      Nany Chadwick MD/PhD

## 2017-09-10 NOTE — PLAN OF CARE
Problem: Chemotherapy Effects (Adult)  Goal: Signs and Symptoms of Listed Potential Problems Will be Absent or Manageable (Chemotherapy Effects)  Signs and symptoms of listed potential problems will be absent or manageable by discharge/transition of care (reference Chemotherapy Effects (Adult) CPG). First HD MTX Pt will be able to eat 50% of meals, Have no falls and no +BC   Outcome: No Change  Bree with pulmonary edema this am.  Face, chest, arms and legs swollen.  Also with SOB with rest and talking.  O2 on at 2L with sat at 94%.  IVF were DC and Lasix was given.  LS clear upper and diminished lower - no crackles heard.  Right PICC arm more swollen then left so US ordered to check for DVT. EKG done and unchanged from previous EKG.   C/O heavy chest so Trop ordered with result of .042 and Chest CT was done and neg for PE.  Echo also ordered.  Phos and Ca++ replaced IV and KCL replaced po.  BMP and Dig level drawn at 1330.  Plan to repeat trop and Phos at 1730.  Lasix X2 with good results and pt said she feel better after lasix but still feel like she is retaining lots of fluids.  Denies nausea and eating fair.  Is fatigued from lack of sleep during noc  MTX level .11 - MD plan to continue leucovorin and bicarb since IVF was DC.  Urine pH 7.5 at 1200.  Hgb 7.5 and will hold off of TX to over any more fluid overload.

## 2017-09-10 NOTE — PLAN OF CARE
Problem: Goal Outcome Summary  Goal: Goal Outcome Summary  Pt continues to complain of rash on hands and reports increased anxiousness and restless right foot. Pt said this was the same thing that happened when she had an allergic rxn to another medication. MD notified and said to give IV ativan and benadryl cream for hands. Symptoms improved with ativan and cream. PICC line had dried blood and site and blue disc was saturated. Vascular notified and dressing changed. Pt having decreased output and has episode of increased shortness of breath and wheezing. MD notified of I/Os and respiratory change. 40mg IV lasix ordered. PH done at 2000 and was 7 will continue to monitor every 8 hours.

## 2017-09-10 NOTE — PLAN OF CARE
Problem: Goal Outcome Summary  Goal: Goal Outcome Summary  Outcome: No Change  Awaiting a.m.  MTX level.Patient eager to discharge home. Ins and outs continue to be behind- much more in than out- despite lasix. Lung sounds are clear but pt is having little- pt sats % on 2LNC. Last ph 7+ but not quite 7.5. Picc became sluggish but   pushed 10 cc ns syringe x2. Now iv rate at 50 cc/hr  and other iv at 140 cc/hr.Has rash on hands and one welt/hive on right wrist.

## 2017-09-11 ENCOUNTER — APPOINTMENT (OUTPATIENT)
Dept: CARDIOLOGY | Facility: CLINIC | Age: 57
DRG: 847 | End: 2017-09-11
Attending: PHYSICIAN ASSISTANT
Payer: COMMERCIAL

## 2017-09-11 VITALS
HEIGHT: 58 IN | TEMPERATURE: 98.5 F | BODY MASS INDEX: 28.46 KG/M2 | HEART RATE: 81 BPM | SYSTOLIC BLOOD PRESSURE: 133 MMHG | WEIGHT: 135.6 LBS | RESPIRATION RATE: 18 BRPM | DIASTOLIC BLOOD PRESSURE: 42 MMHG | OXYGEN SATURATION: 98 %

## 2017-09-11 LAB
ABO + RH BLD: NORMAL
ABO + RH BLD: NORMAL
ALBUMIN SERPL-MCNC: 3.1 G/DL (ref 3.4–5)
ALP SERPL-CCNC: 89 U/L (ref 40–150)
ALT SERPL W P-5'-P-CCNC: 148 U/L (ref 0–50)
ANION GAP SERPL CALCULATED.3IONS-SCNC: 6 MMOL/L (ref 3–14)
AST SERPL W P-5'-P-CCNC: 58 U/L (ref 0–45)
BASOPHILS # BLD AUTO: 0 10E9/L (ref 0–0.2)
BASOPHILS NFR BLD AUTO: 0 %
BILIRUB DIRECT SERPL-MCNC: 0.2 MG/DL (ref 0–0.2)
BILIRUB SERPL-MCNC: 0.8 MG/DL (ref 0.2–1.3)
BLD GP AB SCN SERPL QL: NORMAL
BLD PROD TYP BPU: NORMAL
BLD PROD TYP BPU: NORMAL
BLD UNIT ID BPU: 0
BLOOD BANK CMNT PATIENT-IMP: NORMAL
BLOOD PRODUCT CODE: NORMAL
BPU ID: NORMAL
BUN SERPL-MCNC: 22 MG/DL (ref 7–30)
CALCIUM SERPL-MCNC: 8 MG/DL (ref 8.5–10.1)
CHLORIDE SERPL-SCNC: 100 MMOL/L (ref 94–109)
CO2 SERPL-SCNC: 35 MMOL/L (ref 20–32)
CREAT SERPL-MCNC: 0.53 MG/DL (ref 0.52–1.04)
DIFFERENTIAL METHOD BLD: ABNORMAL
EOSINOPHIL # BLD AUTO: 0 10E9/L (ref 0–0.7)
EOSINOPHIL NFR BLD AUTO: 0 %
ERYTHROCYTE [DISTWIDTH] IN BLOOD BY AUTOMATED COUNT: 27.3 % (ref 10–15)
GFR SERPL CREATININE-BSD FRML MDRD: >90 ML/MIN/1.7M2
GLUCOSE SERPL-MCNC: 78 MG/DL (ref 70–99)
HCT VFR BLD AUTO: 24.2 % (ref 35–47)
HGB BLD-MCNC: 7.9 G/DL (ref 11.7–15.7)
IMM GRANULOCYTES # BLD: 0 10E9/L (ref 0–0.4)
IMM GRANULOCYTES NFR BLD: 0.6 %
INTERPRETATION ECG - MUSE: NORMAL
LYMPHOCYTES # BLD AUTO: 0.2 10E9/L (ref 0.8–5.3)
LYMPHOCYTES NFR BLD AUTO: 3.6 %
MAGNESIUM SERPL-MCNC: 1.7 MG/DL (ref 1.6–2.3)
MCH RBC QN AUTO: 34.3 PG (ref 26.5–33)
MCHC RBC AUTO-ENTMCNC: 32.6 G/DL (ref 31.5–36.5)
MCV RBC AUTO: 105 FL (ref 78–100)
MONOCYTES # BLD AUTO: 0.1 10E9/L (ref 0–1.3)
MONOCYTES NFR BLD AUTO: 0.8 %
MTX SERPL-SCNC: 0.07 UMOL/L
NEUTROPHILS # BLD AUTO: 6.3 10E9/L (ref 1.6–8.3)
NEUTROPHILS NFR BLD AUTO: 95 %
NRBC # BLD AUTO: 0 10*3/UL
NRBC BLD AUTO-RTO: 0 /100
NUM BPU REQUESTED: 1
PH UR STRIP: 8.5 PH (ref 5–7)
PHOSPHATE SERPL-MCNC: 2 MG/DL (ref 2.5–4.5)
PLATELET # BLD AUTO: 56 10E9/L (ref 150–450)
POTASSIUM SERPL-SCNC: 3.4 MMOL/L (ref 3.4–5.3)
PROT SERPL-MCNC: 6 G/DL (ref 6.8–8.8)
RBC # BLD AUTO: 2.3 10E12/L (ref 3.8–5.2)
SODIUM SERPL-SCNC: 141 MMOL/L (ref 133–144)
SPECIMEN EXP DATE BLD: NORMAL
TRANSFUSION STATUS PATIENT QL: NORMAL
TRANSFUSION STATUS PATIENT QL: NORMAL
WBC # BLD AUTO: 6.6 10E9/L (ref 4–11)

## 2017-09-11 PROCEDURE — 36592 COLLECT BLOOD FROM PICC: CPT | Performed by: NURSE PRACTITIONER

## 2017-09-11 PROCEDURE — 80299 QUANTITATIVE ASSAY DRUG: CPT | Performed by: NURSE PRACTITIONER

## 2017-09-11 PROCEDURE — P9037 PLATE PHERES LEUKOREDU IRRAD: HCPCS | Performed by: NURSE PRACTITIONER

## 2017-09-11 PROCEDURE — 25000132 ZZH RX MED GY IP 250 OP 250 PS 637: Performed by: INTERNAL MEDICINE

## 2017-09-11 PROCEDURE — 93306 TTE W/DOPPLER COMPLETE: CPT

## 2017-09-11 PROCEDURE — 84100 ASSAY OF PHOSPHORUS: CPT | Performed by: PHYSICIAN ASSISTANT

## 2017-09-11 PROCEDURE — 25000125 ZZHC RX 250: Performed by: PHYSICIAN ASSISTANT

## 2017-09-11 PROCEDURE — 83735 ASSAY OF MAGNESIUM: CPT | Performed by: PHYSICIAN ASSISTANT

## 2017-09-11 PROCEDURE — 25000132 ZZH RX MED GY IP 250 OP 250 PS 637: Performed by: NURSE PRACTITIONER

## 2017-09-11 PROCEDURE — 36592 COLLECT BLOOD FROM PICC: CPT | Performed by: PHYSICIAN ASSISTANT

## 2017-09-11 PROCEDURE — 80076 HEPATIC FUNCTION PANEL: CPT | Performed by: PHYSICIAN ASSISTANT

## 2017-09-11 PROCEDURE — 86900 BLOOD TYPING SEROLOGIC ABO: CPT | Performed by: INTERNAL MEDICINE

## 2017-09-11 PROCEDURE — 86901 BLOOD TYPING SEROLOGIC RH(D): CPT | Performed by: INTERNAL MEDICINE

## 2017-09-11 PROCEDURE — 25000132 ZZH RX MED GY IP 250 OP 250 PS 637: Performed by: PHYSICIAN ASSISTANT

## 2017-09-11 PROCEDURE — 80048 BASIC METABOLIC PNL TOTAL CA: CPT | Performed by: PHYSICIAN ASSISTANT

## 2017-09-11 PROCEDURE — 0399T ZZHC MYOCARDIAL STRAIN IMAGING: CPT

## 2017-09-11 PROCEDURE — 85025 COMPLETE CBC W/AUTO DIFF WBC: CPT | Performed by: PHYSICIAN ASSISTANT

## 2017-09-11 PROCEDURE — 99239 HOSP IP/OBS DSCHRG MGMT >30: CPT | Performed by: INTERNAL MEDICINE

## 2017-09-11 PROCEDURE — 25000128 H RX IP 250 OP 636: Performed by: PHYSICIAN ASSISTANT

## 2017-09-11 PROCEDURE — 25000128 H RX IP 250 OP 636: Performed by: INTERNAL MEDICINE

## 2017-09-11 PROCEDURE — 86850 RBC ANTIBODY SCREEN: CPT | Performed by: INTERNAL MEDICINE

## 2017-09-11 PROCEDURE — 25000128 H RX IP 250 OP 636: Performed by: NURSE PRACTITIONER

## 2017-09-11 RX ORDER — ACETAMINOPHEN 325 MG/1
650 TABLET ORAL EVERY 4 HOURS PRN
COMMUNITY
Start: 2017-09-11 | End: 2019-12-12

## 2017-09-11 RX ORDER — LEUCOVORIN CALCIUM 5 MG/1
20 TABLET ORAL EVERY 6 HOURS SCHEDULED
Status: DISCONTINUED | OUTPATIENT
Start: 2017-09-11 | End: 2017-09-11 | Stop reason: HOSPADM

## 2017-09-11 RX ORDER — ONDANSETRON 8 MG/1
8 TABLET, ORALLY DISINTEGRATING ORAL EVERY 8 HOURS PRN
Qty: 20 TABLET | Refills: 2 | Status: SHIPPED | OUTPATIENT
Start: 2017-09-11 | End: 2018-01-12

## 2017-09-11 RX ORDER — DIPHENHYDRAMINE HCL 25 MG
25-50 CAPSULE ORAL EVERY 6 HOURS PRN
Status: DISCONTINUED | OUTPATIENT
Start: 2017-09-11 | End: 2017-09-11 | Stop reason: HOSPADM

## 2017-09-11 RX ORDER — POTASSIUM CHLORIDE 1500 MG/1
40 TABLET, EXTENDED RELEASE ORAL DAILY
Qty: 60 TABLET | Refills: 1 | Status: SHIPPED | OUTPATIENT
Start: 2017-09-11 | End: 2017-10-19

## 2017-09-11 RX ORDER — FUROSEMIDE 10 MG/ML
40 INJECTION INTRAMUSCULAR; INTRAVENOUS ONCE
Status: COMPLETED | OUTPATIENT
Start: 2017-09-11 | End: 2017-09-11

## 2017-09-11 RX ORDER — FUROSEMIDE 20 MG
20 TABLET ORAL DAILY
Qty: 5 TABLET | Refills: 1 | Status: SHIPPED | OUTPATIENT
Start: 2017-09-11 | End: 2017-10-11

## 2017-09-11 RX ORDER — LEUCOVORIN CALCIUM 15 MG/1
15 TABLET ORAL EVERY 6 HOURS
Qty: 12 TABLET | Refills: 1 | Status: SHIPPED | OUTPATIENT
Start: 2017-09-11 | End: 2017-10-11

## 2017-09-11 RX ORDER — TRAMADOL HYDROCHLORIDE 50 MG/1
50 TABLET ORAL EVERY 6 HOURS PRN
Qty: 30 TABLET | Refills: 0 | Status: SHIPPED | OUTPATIENT
Start: 2017-09-11 | End: 2018-01-24

## 2017-09-11 RX ADMIN — SODIUM BICARBONATE 650 MG TABLET 3900 MG: at 00:08

## 2017-09-11 RX ADMIN — LEUCOVORIN CALCIUM 20 MG: 5 TABLET ORAL at 12:53

## 2017-09-11 RX ADMIN — POTASSIUM CHLORIDE 20 MEQ: 750 TABLET, EXTENDED RELEASE ORAL at 08:45

## 2017-09-11 RX ADMIN — GABAPENTIN 200 MG: 100 CAPSULE ORAL at 14:46

## 2017-09-11 RX ADMIN — ALLOPURINOL 300 MG: 300 TABLET ORAL at 08:44

## 2017-09-11 RX ADMIN — GABAPENTIN 200 MG: 100 CAPSULE ORAL at 08:44

## 2017-09-11 RX ADMIN — FUROSEMIDE 40 MG: 10 INJECTION, SOLUTION INTRAVENOUS at 10:23

## 2017-09-11 RX ADMIN — RANITIDINE HYDROCHLORIDE 150 MG: 150 TABLET, FILM COATED ORAL at 08:45

## 2017-09-11 RX ADMIN — DIPHENHYDRAMINE HYDROCHLORIDE 25 MG: 25 CAPSULE ORAL at 14:05

## 2017-09-11 RX ADMIN — POTASSIUM PHOSPHATE, MONOBASIC AND POTASSIUM PHOSPHATE, DIBASIC 15 MMOL: 224; 236 INJECTION, SOLUTION INTRAVENOUS at 08:45

## 2017-09-11 RX ADMIN — ACYCLOVIR 400 MG: 400 TABLET ORAL at 08:44

## 2017-09-11 RX ADMIN — PREDNISONE 5 MG: 5 TABLET ORAL at 08:44

## 2017-09-11 RX ADMIN — MULTIPLE VITAMINS W/ MINERALS TAB 1 TABLET: TAB at 12:20

## 2017-09-11 RX ADMIN — DIGOXIN 125 MCG: 125 TABLET ORAL at 08:44

## 2017-09-11 RX ADMIN — ATENOLOL 25 MG: 25 TABLET ORAL at 08:44

## 2017-09-11 RX ADMIN — OXYCODONE HYDROCHLORIDE AND ACETAMINOPHEN 500 MG: 500 TABLET ORAL at 12:20

## 2017-09-11 RX ADMIN — ENOXAPARIN SODIUM 70 MG: 80 INJECTION SUBCUTANEOUS at 06:57

## 2017-09-11 RX ADMIN — THIAMINE HCL (VITAMIN B1) 50 MG TABLET 50 MG: at 12:20

## 2017-09-11 RX ADMIN — Medication 2 TABLET: at 12:20

## 2017-09-11 RX ADMIN — LEUCOVORIN CALCIUM 25 MG: 350 INJECTION, POWDER, LYOPHILIZED, FOR SOLUTION INTRAMUSCULAR; INTRAVENOUS at 02:25

## 2017-09-11 RX ADMIN — SODIUM BICARBONATE 650 MG TABLET 3900 MG: at 04:02

## 2017-09-11 RX ADMIN — CALCIUM POLYCARBOPHIL 1250 MG: 625 TABLET, FILM COATED ORAL at 08:44

## 2017-09-11 NOTE — PLAN OF CARE
Problem: Goal Outcome Summary  Goal: Goal Outcome Summary  BP slightly elevated, MD notified to see if parameters for PRN hydralazine needed to be lowered but no changes at this time. New DVT found in right upper extremity after US was done. MD notified and pt started on Lovenox. PICC line is to remain in place per MD. Pt K replaced and redraw in AM. Phos level came back at 2.6 no more replacements needed at this time. Pt had 40 lasix and continues to have adequate urine output. Lungs sounds are clear and pt is sating in upper 90s. Ph level at 2000 was 8.5 and levels continue to be checked every 8 hours. Pt resting well in bed. Will continue plan of care.

## 2017-09-11 NOTE — PLAN OF CARE
Problem: Goal Outcome Summary  Goal: Goal Outcome Summary  Outcome: No Change   Receiving leucovorin iv push  q 6 hr.Urine ph is 8.5. Continues with sodium bicarb pills q 4 hours and waiting for Methotrexate level to be less than 0.1. Patient annmarie need to be on enoxaparin at home. Found clot near picc line. Will get echo today. Urine output  is slowing down as night progressed.Patient preformed enoxaparin just fine.

## 2017-09-11 NOTE — DISCHARGE SUMMARY
[facility], [place]    Discharge Summary  Hematology / Oncology    Date of Admission:  9/7/2017  Date of Discharge:  9/11/2017  3:44 PM  Discharging Provider: Sara Jo DNP, APRN, CNP  Primary Heme/Oncologist: Dr. Rodriguez    Discharge Diagnoses      Diffuse large B-cell lymphoma of extranodal site (H)  Acute deep vein thrombosis (DVT) of right upper extremity, unspecified vein (H)  Diffuse large B-cell lymphoma, unspecified body region (H)  Other hypervolemia  Hypophosphatemia    History of Present Illness   Amelia Michel is a 56 year old woman with h/o RA and new diagnosis of stage IVB DLBCL high risk, double hit. Presented on previous admission with cardiac arrest and significant cytopenias. She received 1 dose R-EPOCH. Her current regimen is R-CHOP with HD MTX on D15 of Cycles 2, 4, and 6. She received C2 on 8/23/17. She was admitted for C2D15 of HD MTX. Of note, she had a negative LP on 8/23.     Hospital Course   Amelia Michel was admitted on 9/7/2017.  The following problems were addressed during her hospitalization:    DLCBL:  DLBCL, stage IVB, high risk, double hit. Admitted for C2D15 HD MTX. Tolerated relatively well, though did develop hypervolemia and b/l pleural effusions. MTX level came down nicely to 0.07. Will continue on oral leucovorin 10mg/m2 PO q6h at least until appt on Wed given possibility to retain MTX in pleural effusions. Continue allopurinol though pt will discuss if needs to continue with Dr. Rodriguez.     Acute non-occlusive RUE PICC-associated DVT:  RUE US+ on 9/10. PICC removed and pt started on lovenox 1mg/kg SQ bid. Plan to treat for at least 1 month, then likely repeat US. Will need to monitor plts closely while on lovenox. Gave 1 dose plts prior to discharge on 9/11 and she will have labs checked again 9/13.     Anemia, thrombocytopenia:  Secondary to lymphoma and chemo. Gave plts prior to discharge to keep >50k with new lovenox. Of note,  developed ~5 small hives with plts, resolved with benadryl. Recommend premedicate with benadryl in the future. No RBC transfusion needs at this time. Check labs again 9/13.     ID prophy:  Continue ACV, fluconazole, levaquin.     H/o afib:  Currently in NSR. Echo 8/31 with EF 55-60%, mitral and tricuspid insufficiency. Repeat echo 9/11 stable. Murmur noted on exam. Continue PTA digoxin and atenolol. Sees Cardiology at end of September.     Urinary incontinence:  H/o saddle anesthesia and UI mostly at night. Will need Urology and urodynamic studies outpt - scheduled next month.     LFT elevation:  Likely secondary to liver involvement. Continues to trend down.     H/o RA:  30+ years of RA, long h/o therapy with MTX, pred, and HCQ. Continue PTA pred 5mg daily. Has f/u with Rheumatology on 9/15.     Steroid-induced hyperglycemia:  Managed with insulins. Now off insulins and BG remains <180.     Hypervolemia:  Weight up 10kg since admission. +Edema in legs, abd, chest, face. Was hypoxic, no longer with diuresis. Have been diuresing with lasix, good UOP and slow but noticeable improvement. Pulm HTN likely contributing in setting of MTX and IV fluids. Continue lasix 20mg PO daily at home x5 days, refill given if needed to continue longer. Increased PTA potassium supplement from 20 to 40meq while on lasix.     Hypophosphatemia:  Discharged on oral phos supplement bid.     Sara Jo DNP, APRN, CNP  Hematology/Oncology  Pager: [phone]    Significant Results and Procedures   Results for orders placed or performed during the hospital encounter of 09/07/17   US Upper Extremity Venous Duplex Right Portable     Value    Radiologist flags Right brachial vein DVT (Urgent)    Narrative    EXAMINATION: DOPPLER VENOUS ULTRASOUND OF THE RIGHT UPPER EXTREMITY,  9/10/2017 4:20 PM     COMPARISON: 7/5/2017.    HISTORY: right UE swelling, PICC in place, eval for DVT    TECHNIQUE:  Gray-scale evaluation with compression, spectral  flow and  color Doppler assessment of the deep venous system of the right upper  extremity.    FINDINGS:  Right: Echogenic PICC line seen traversing through the right brachial  and subclavian veins. Intraluminal thrombus associated with one of the  paired brachial veins superior to the bandage. This vessel is only  partially compressible.     Normal blood flow and waveforms are demonstrated in the internal  jugular, innominate, subclavian, and axillary veins. There is normal  compressibility of the basilic and cephalic veins.      Impression    IMPRESSION:  Nonocclusive DVT in one of the paired brachial veins associated with  the right upper extremity PICC.    [Urgent Result: Right brachial vein DVT]    Finding was identified on 9/10/2017 4:16 PM.     Dr. Rodarte was contacted by Dr. Lopez at 9/10/2017 4:21 PM and  verbalized understanding of the urgent finding.     I have personally reviewed the examination and initial interpretation  and I agree with the findings.    STEPHANIE HALL MD   CT Chest Pulmonary Embolism w Contrast    Narrative    EXAMINATION: CT CHEST PULMONARY EMBOLISM W CONTRAST, 9/10/2017 12:21  PM     COMPARISON: 7/13/2017 and 8/8/2017    HISTORY: SOB    TECHNIQUE: Volumetric helical acquisition of CT images of the chest  from the lung apices to the kidneys were acquired after the  administration of 54 mL of Isovue-370 IV contrast. Three-dimensional  (3D) post-processed angiographic images were reconstructed, archived  to PACS and used in interpretation of this study.    Total DLP: 216.61 mGy*cm.    Findings:   Lungs: There is adequate contrast bolus in the study. No evidence of  right heart strain. Contrast bolus adequately opacifies the pulmonary  ateries. No filling defect to suggest pulmonary embolism. Mild right  atrial dilatation and reflux of contrast into the hepatic veins.    Right arm PICC tip terminates in the mid SVC. Sternotomy wires.  Heterotopic ossification posterior to the sternotomy  "wires/within the  anterior mediastinum. Bilateral pleural effusions with adjacent  atelectasis. No pericardial effusion. Mild atherosclerotic  calcifications of the aortic arch. Bulky prevascular and AP window  lymph nodes. For example, the largest prevascular lymph node measures  approximately 1.8 cm in short axis diameter. Central tracheobronchial  tree is patent. No pneumothorax. No definitive evidence for pulmonary  infection. Engorged pulmonary vasculature with interlobular septal  thickening and dependent scattered groundglass opacities. No organized  consolidative opacities.    Chest: Generalized anasarca. Moderate cardiomegaly. No pericardial  effusion. Aorta and great vessels are unremarkable.    Upper Abdomen: Partial view of the upper abdomen is otherwise  unremarkable.    Bones: Multiple healed rib fractures, including the right second  through seventh and third through seventh left ribs. Degenerative  changes of spine, including anterior osteophytes and intervertebral  vacuum disc phenomenon. No acute fracture or suspicious osseous  lesions.      Impression    Impression:  1. No pulmonary embolus.  2. Generalized anasarca and pulmonary edema.  3. Bilateral pleural effusions with adjacent atelectasis.  4. Moderate cardiomegaly.  5.  Right atrial dilatation with reflux of contrast in the hepatic  veins. Findings could reflect underlying potential right heart  dysfunction.  6. Nonspecific mediastinal lymphadenopathy.    I have personally reviewed the examination and initial interpretation  and I agree with the findings.    ALINA HOWARD MD       Code Status   Full Code    Physical Exam   /42  Pulse 81  Temp 98.5  F (36.9  C) (Oral)  Resp 18  Ht 1.473 m (4' 10\")  Wt 61.5 kg (135 lb 9.6 oz)  SpO2 98%  BMI 28.34 kg/m2  General Appearance: Pleasant woman seen sitting comfortably in bed in NAD.   HEENT: Face appears puffy. PERRL, sclera anicteric. Moist mucus membranes, no lesions or thrush.   CV: " RRR, soft holosystolic murmur.   RESP: Non-labored breathing. Lungs CTA bilaterally.  GI: +BS, abd soft, non-tender, +bloating/mild swelling.   EXT: Non-pitting edema b/l thighs.   SKIN: No concerning rash or lesions. Known wound on left inner arm, dressed.   NEURO: A&O, speech normal, no focal deficits.   PSYCH: Appropriate affect.    Discharge Disposition   Discharged to home  Condition at discharge: Stable    Consultations This Hospital Stay   VASCULAR ACCESS ADULT IP CONSULT  WOUND OSTOMY CONTINENCE NURSE  IP CONSULT  VASCULAR ACCESS CARE ADULT IP CONSULT  VASCULAR ACCESS CARE ADULT IP CONSULT    Discharge Orders     CBC with platelets differential   Last Lab Result: Hemoglobin (g/dL)      Date                     Value                09/11/2017               7.9 (L)          ----------     Basic metabolic panel     Phosphorus     Home care nursing referral     Reason for your hospital stay   You were admitted for cycle 2 day 15 high dose methotrexate.     Follow Up and recommended labs and tests   Labs and appointment with Dr. Rodriguez on Wednesday as discussed in the hospital and listed below.     Activity   Your activity upon discharge: Activity as tolerated. No driving or strenuous activity while taking narcotics, if having headaches/dizziness/vision changes, or if feeling generally weak or unwell.     When to contact your care team   Call the [facility] 24-hour triage line at [phone] for temp >100.4, uncontrolled nausea/vomiting/diarrhea/constipation, unrelieved pain, bleeding not relieved with pressure, dizziness, chest pain, shortness of breath, loss of consciousness, and any new or concerning symptoms.     Call [phone] and ask for the care coordinator that works with your oncologist if you have questions about scans, appointments, hospital follow-up, or other concerns.     Full Code     Diet   Follow this diet upon discharge: Regular diet as tolerated. Be sure to drink plenty of  non-caffeinated fluids.       Discharge Medications   Discharge Medication List as of 9/11/2017  2:33 PM      START taking these medications    Details   traMADol (ULTRAM) 50 MG tablet Take 1 tablet (50 mg) by mouth every 6 hours as needed for moderate pain, Disp-30 tablet, R-0, Local Print      acetaminophen (TYLENOL) 325 MG tablet Take 2 tablets (650 mg) by mouth every 4 hours as needed for mild pain, fever or headaches, OTC      leucovorin 15 MG TABS Take 1 tablet (15 mg) by mouth every 6 hours for 3 days, Disp-12 tablet, R-1, E-Prescribe      furosemide (LASIX) 20 MG tablet Take 1 tablet (20 mg) by mouth daily for 5 days, Disp-5 tablet, R-1, E-Prescribe      potassium & sodium phosphates (NEUTRA-PHOS) 280-160-250 MG Packet Take 1 packet by mouth 2 times daily, Disp-60 packet, R-1, E-Prescribe      ondansetron (ZOFRAN-ODT) 8 MG ODT tab Take 1 tablet (8 mg) by mouth every 8 hours as needed, Disp-20 tablet, R-2, E-Prescribe         CONTINUE these medications which have CHANGED    Details   potassium chloride SA (K-DUR/KLOR-CON M) 20 MEQ CR tablet Take 2 tablets (40 mEq) by mouth daily while taking furosemide (Lasix). You can decrease potassium to 20 mEq daily once off Lasix., Disp-60 tablet, R-1, E-Prescribe      enoxaparin (LOVENOX) 60 MG/0.6ML injection Inject 0.6 mLs (60 mg) Subcutaneous every 12 hours for 30 days, Disp-60 Syringe, R-1, E-Prescribe         CONTINUE these medications which have NOT CHANGED    Details   calcium polycarbophil (FIBERCON) 625 MG tablet Take 1 tablet by mouth 2 times daily, Historical      gabapentin (NEURONTIN) 100 MG capsule Take 2 capsules (200 mg) by mouth 3 times daily, Disp-120 capsule, R-0, E-Prescribe      lactobacillus rhamnosus, GG, (CULTURELL) capsule Take 1 capsule by mouth 2 times daily, Disp-60 capsule, R-0, Local Print      fluconazole (DIFLUCAN) 200 MG tablet Take 1 tablet (200 mg) by mouth daily, Disp-30 tablet, R-0, E-Prescribe      acyclovir (ZOVIRAX) 400 MG tablet  Take 1 tablet (400 mg) by mouth 2 times daily, Disp-60 tablet, R-0, E-Prescribe      atenolol (TENORMIN) 25 MG tablet Take 1 tablet (25 mg) by mouth 2 times daily, Disp-60 tablet, R-0, E-Prescribe      digoxin (LANOXIN) 125 MCG tablet Take 1 tablet (125 mcg) by mouth daily, Disp-30 tablet, R-0, E-Prescribe      predniSONE (DELTASONE) 5 MG tablet Take 1 tablet (5 mg) by mouth daily, Disp-30 tablet, R-0, E-Prescribe      levofloxacin (LEVAQUIN) 250 MG tablet Take 1 tablet (250 mg) by mouth daily, Disp-30 tablet, R-0, E-Prescribe      allopurinol (ZYLOPRIM) 300 MG tablet Take 1 tablet (300 mg) by mouth daily, Disp-30 tablet, R-0, E-Prescribe      calcium-vitamin D (CALTRATE) 600-400 MG-UNIT per tablet Take 2 tablets by mouth every morning, Disp-60 tablet, R-0, E-Prescribe      multivitamin, therapeutic with minerals (THERA-VIT-M) TABS tablet Take 1 tablet by mouth daily, Disp-30 each, R-0, E-Prescribe      ascorbic acid 500 MG TABS Take 1 tablet (500 mg) by mouth daily, Disp-30 tablet, R-0, E-Prescribe      thiamine 50 MG TABS Take 1 tablet (50 mg) by mouth daily, Disp-30 tablet, R-0, E-Prescribe           Allergies   Allergies   Allergen Reactions     Metoprolol Other (See Comments)     Pt and  report that metoprolol does not work for her and also reports feeling unwell with this medication. She has been able to tolerate atenolol, which as worked in controlling her HR.      No Clinical Screening - See Comments      Penicillin Allergy Skin Test not performed, see antimicrobial management team progress note 7/5/17.       Penicillins      Tape [Adhesive Tape] Rash     Data   CBC  Recent Labs  Lab 09/11/17  0617 09/10/17  0552 09/09/17  0539 09/08/17  0537   WBC 6.6 9.8 8.9 8.1   RBC 2.30* 2.21* 2.39* 2.61*   HGB 7.9* 7.5* 7.9* 8.8*   HCT 24.2* 23.4* 24.8* 26.5*   * 106* 104* 102*   MCH 34.3* 33.9* 33.1* 33.7*   MCHC 32.6 32.1 31.9 33.2   RDW 27.3* 27.9* 26.8* 25.5*   PLT 56* 54* 62* 58*     CMP  Recent  Labs  Lab 09/11/17  0617 09/10/17  1717 09/10/17  1333 09/10/17  0552 09/09/17  1707 09/09/17  0539  09/08/17  0537 09/07/17  1000 09/06/17  0744     --  143 144  --  146*  --  141 141 140   POTASSIUM 3.4  --  3.9 3.7 4.1 3.2*  --  3.6 3.8 3.9   CHLORIDE 100  --  104 108  --  109  --  104 108 106   CO2 35*  --  33* 30  --  30  --  32 28 30   ANIONGAP 6  --  7 7  --  7  --  6 6 5   GLC 78  --  150* 144*  --  165*  --  226* 110* 108*   BUN 22  --  25 25  --  15  --  18 22 17   CR 0.53  --  0.52 0.47*  --  0.40*  --  0.51* 0.72 0.63   GFRESTIMATED >90  --  >90 >90  --  >90  --  >90 83 >90   GFRESTBLACK >90  --  >90 >90  --  >90  --  >90 >90 >90   VIKTORIYA 8.0*  --  7.8* 6.4*  --  6.7*  --  7.9* 8.8 9.2   MAG 1.7  --   --  1.6  --  1.9  --  1.8  --   --    PHOS 2.0* 2.6  --  2.3* 2.2* 2.0*  < > 1.7*  --   --    PROTTOTAL 6.0*  --   --   --   --   --   --   --  6.0* 6.8   ALBUMIN 3.1*  --   --   --   --   --   --   --  3.1* 3.4   BILITOTAL 0.8  --   --   --   --   --   --   --  0.4 0.5   ALKPHOS 89  --   --   --   --   --   --   --  105 119   AST 58*  --   --   --   --   --   --   --  30 35   *  --   --   --   --   --   --   --  58* 67*   < > = values in this interval not displayed.  INRNo lab results found in last 7 days.    I have seen, interviewed, and examined the patient independently.  I have reviewed the vital signs and labs.  This note reflects my assessment and plan.    We have spent greater than 30 minutes organizing outpatient care, follow up appointments, and medications.    Amelia is feeling better and ready for discharge.    Cardiac workup was unrevealing. CT chest shows edema and pleural effusions. MTX level 0.07 (down from 0.11 yesterday). Diuresed well so expect pleural effusions to reabsorb, but will need to monitor this closely or avoid HD MTX as it can accumulate in pleural effusions. Keep oral leucovorin for now    PICC associated DVT, will d/c picc, give lovenox x1 month    I discussed the  events of this admission with Dr. Rodriguez, she will see Amelia in  2 days in the clinic    Nany Chadwick MD/PhD

## 2017-09-11 NOTE — PLAN OF CARE
Problem: Discharge Planning  Goal: Discharge Planning (Adult, OB, Behavioral, Peds)  Outcome: Adequate for Discharge Date Met:  09/11/17  Bree with less edema today and able to breath easier.  LS remain clear and did get 40 mg IV Lasix with good results.  MTX level .07 and po leucovorin ordered due to pleural effusions.  Plt 56K.  Plt ordered to keep about 50K in order to give Lovenox.  At end of plt transfusion  Developed 5 hives on upper body.  VSS and denied SOB, CP and no hives found on trunk or extremities.  Ana TOBIN notified and po Benadryl given - hives resolved after that.   Pt was able to give her own Lovenox injection today and did well.  IV Phos and po KCL supp done this am.  Denies nausea and eating well.      ADD: VSS, hives resolved and no further hives noted. PICC removed with minimal bleeding.  DC instructions and medication reviewed with pt and her  - no questions.

## 2017-09-12 ENCOUNTER — CARE COORDINATION (OUTPATIENT)
Dept: CARE COORDINATION | Facility: CLINIC | Age: 57
End: 2017-09-12

## 2017-09-12 NOTE — PROGRESS NOTES
Patient has clinic visit within 24-48 hours of Discharge so no post DC follow up call is needed          Follow-up Appointments           Follow Up and recommended labs and tests         Labs and appointment with Dr. Rodriguez on Wednesday as discussed in the hospital and listed below.                        Your next 10 appointments already scheduled            Sep 13, 2017 11:15 AM CDT   [facility]  [facility]   [facility] ([facility])     [address] [place] [address] [id number] [phone]                  Sep 13, 2017 12:00 PM CDT   RETURN ONC with Lenore Rodriguez MD   [facility] ([facility])     [address] [place] [address] [id number] [phone]

## 2017-09-13 ENCOUNTER — DOCUMENTATION ONLY (OUTPATIENT)
Dept: PHARMACY | Facility: CLINIC | Age: 57
End: 2017-09-13

## 2017-09-13 ENCOUNTER — OFFICE VISIT (OUTPATIENT)
Dept: TRANSPLANT | Facility: CLINIC | Age: 57
End: 2017-09-13
Attending: INTERNAL MEDICINE
Payer: COMMERCIAL

## 2017-09-13 ENCOUNTER — TELEPHONE (OUTPATIENT)
Dept: FAMILY MEDICINE | Facility: CLINIC | Age: 57
End: 2017-09-13

## 2017-09-13 ENCOUNTER — APPOINTMENT (OUTPATIENT)
Dept: LAB | Facility: CLINIC | Age: 57
End: 2017-09-13
Attending: INTERNAL MEDICINE
Payer: COMMERCIAL

## 2017-09-13 VITALS
TEMPERATURE: 98.4 F | WEIGHT: 124.9 LBS | RESPIRATION RATE: 18 BRPM | SYSTOLIC BLOOD PRESSURE: 131 MMHG | BODY MASS INDEX: 26.1 KG/M2 | DIASTOLIC BLOOD PRESSURE: 81 MMHG | HEART RATE: 62 BPM | OXYGEN SATURATION: 94 %

## 2017-09-13 DIAGNOSIS — I82.621 ACUTE DEEP VEIN THROMBOSIS (DVT) OF RIGHT UPPER EXTREMITY, UNSPECIFIED VEIN (H): Primary | ICD-10-CM

## 2017-09-13 DIAGNOSIS — C83.30 DIFFUSE LARGE B-CELL LYMPHOMA, UNSPECIFIED BODY REGION (H): ICD-10-CM

## 2017-09-13 DIAGNOSIS — C83.398 DIFFUSE LARGE B-CELL LYMPHOMA OF EXTRANODAL SITE: ICD-10-CM

## 2017-09-13 DIAGNOSIS — I47.19 ATRIAL TACHYCARDIA (H): ICD-10-CM

## 2017-09-13 LAB
ANION GAP SERPL CALCULATED.3IONS-SCNC: 6 MMOL/L (ref 3–14)
BASOPHILS # BLD AUTO: 0 10E9/L (ref 0–0.2)
BASOPHILS NFR BLD AUTO: 0.3 %
BUN SERPL-MCNC: 19 MG/DL (ref 7–30)
CALCIUM SERPL-MCNC: 9.4 MG/DL (ref 8.5–10.1)
CHLORIDE SERPL-SCNC: 101 MMOL/L (ref 94–109)
CO2 SERPL-SCNC: 31 MMOL/L (ref 20–32)
CREAT SERPL-MCNC: 0.61 MG/DL (ref 0.52–1.04)
DIFFERENTIAL METHOD BLD: ABNORMAL
EOSINOPHIL # BLD AUTO: 0 10E9/L (ref 0–0.7)
EOSINOPHIL NFR BLD AUTO: 0.1 %
ERYTHROCYTE [DISTWIDTH] IN BLOOD BY AUTOMATED COUNT: 26.6 % (ref 10–15)
GFR SERPL CREATININE-BSD FRML MDRD: >90 ML/MIN/1.7M2
GLUCOSE SERPL-MCNC: 118 MG/DL (ref 70–99)
HCT VFR BLD AUTO: 26.9 % (ref 35–47)
HGB BLD-MCNC: 8.8 G/DL (ref 11.7–15.7)
IMM GRANULOCYTES # BLD: 0.1 10E9/L (ref 0–0.4)
IMM GRANULOCYTES NFR BLD: 1.9 %
LMWH PPP CHRO-ACNC: 0.71 IU/ML
LYMPHOCYTES # BLD AUTO: 0.3 10E9/L (ref 0.8–5.3)
LYMPHOCYTES NFR BLD AUTO: 4.4 %
MCH RBC QN AUTO: 34.4 PG (ref 26.5–33)
MCHC RBC AUTO-ENTMCNC: 32.7 G/DL (ref 31.5–36.5)
MCV RBC AUTO: 105 FL (ref 78–100)
MONOCYTES # BLD AUTO: 0.5 10E9/L (ref 0–1.3)
MONOCYTES NFR BLD AUTO: 6.3 %
NEUTROPHILS # BLD AUTO: 6.5 10E9/L (ref 1.6–8.3)
NEUTROPHILS NFR BLD AUTO: 87 %
NRBC # BLD AUTO: 0.1 10*3/UL
NRBC BLD AUTO-RTO: 1 /100
PHOSPHATE SERPL-MCNC: 3.4 MG/DL (ref 2.5–4.5)
PLATELET # BLD AUTO: 85 10E9/L (ref 150–450)
POTASSIUM SERPL-SCNC: 4.4 MMOL/L (ref 3.4–5.3)
RBC # BLD AUTO: 2.56 10E12/L (ref 3.8–5.2)
SODIUM SERPL-SCNC: 138 MMOL/L (ref 133–144)
WBC # BLD AUTO: 7.5 10E9/L (ref 4–11)

## 2017-09-13 PROCEDURE — 36415 COLL VENOUS BLD VENIPUNCTURE: CPT

## 2017-09-13 PROCEDURE — 84100 ASSAY OF PHOSPHORUS: CPT | Performed by: NURSE PRACTITIONER

## 2017-09-13 PROCEDURE — 85520 HEPARIN ASSAY: CPT | Performed by: INTERNAL MEDICINE

## 2017-09-13 PROCEDURE — 85025 COMPLETE CBC W/AUTO DIFF WBC: CPT | Performed by: NURSE PRACTITIONER

## 2017-09-13 PROCEDURE — 80048 BASIC METABOLIC PNL TOTAL CA: CPT | Performed by: NURSE PRACTITIONER

## 2017-09-13 PROCEDURE — 99212 OFFICE O/P EST SF 10 MIN: CPT | Mod: ZF

## 2017-09-13 RX ORDER — PALONOSETRON 0.05 MG/ML
0.25 INJECTION, SOLUTION INTRAVENOUS ONCE
Status: CANCELLED
Start: 2017-09-20

## 2017-09-13 RX ORDER — ATENOLOL 25 MG/1
25 TABLET ORAL 2 TIMES DAILY
Qty: 60 TABLET | Refills: 0 | Status: SHIPPED | OUTPATIENT
Start: 2017-09-13 | End: 2017-10-19

## 2017-09-13 RX ORDER — DIPHENHYDRAMINE HCL 25 MG
50 CAPSULE ORAL ONCE
Status: CANCELLED
Start: 2017-09-20

## 2017-09-13 RX ORDER — METHYLPREDNISOLONE SODIUM SUCCINATE 125 MG/2ML
125 INJECTION, POWDER, LYOPHILIZED, FOR SOLUTION INTRAMUSCULAR; INTRAVENOUS
Status: CANCELLED
Start: 2017-09-20

## 2017-09-13 RX ORDER — ALBUTEROL SULFATE 90 UG/1
1-2 AEROSOL, METERED RESPIRATORY (INHALATION)
Status: CANCELLED
Start: 2017-09-20

## 2017-09-13 RX ORDER — FLUCONAZOLE 200 MG/1
100 TABLET ORAL DAILY
Qty: 30 TABLET | Refills: 0 | COMMUNITY
Start: 2017-09-13 | End: 2017-09-13

## 2017-09-13 RX ORDER — ALBUTEROL SULFATE 0.83 MG/ML
2.5 SOLUTION RESPIRATORY (INHALATION)
Status: CANCELLED | OUTPATIENT
Start: 2017-09-20

## 2017-09-13 RX ORDER — DIGOXIN 125 MCG
125 TABLET ORAL DAILY
Qty: 30 TABLET | Refills: 0 | Status: SHIPPED | OUTPATIENT
Start: 2017-09-13 | End: 2017-10-19

## 2017-09-13 RX ORDER — FLUCONAZOLE 100 MG/1
100 TABLET ORAL DAILY
Qty: 30 TABLET | Refills: 0 | Status: SHIPPED | OUTPATIENT
Start: 2017-09-13 | End: 2017-10-19

## 2017-09-13 RX ORDER — ACYCLOVIR 400 MG/1
400 TABLET ORAL 2 TIMES DAILY
Qty: 60 TABLET | Refills: 0 | Status: SHIPPED | OUTPATIENT
Start: 2017-09-13 | End: 2017-10-19

## 2017-09-13 RX ORDER — EPINEPHRINE 1 MG/ML
0.3 INJECTION INTRAMUSCULAR; INTRAVENOUS; SUBCUTANEOUS EVERY 5 MIN PRN
Status: CANCELLED | OUTPATIENT
Start: 2017-09-20

## 2017-09-13 RX ORDER — PREDNISONE 5 MG/1
5 TABLET ORAL DAILY
Qty: 30 TABLET | Refills: 0 | Status: SHIPPED | OUTPATIENT
Start: 2017-09-13 | End: 2017-12-12

## 2017-09-13 RX ORDER — ACETAMINOPHEN 325 MG/1
650 TABLET ORAL ONCE
Status: CANCELLED
Start: 2017-09-20

## 2017-09-13 RX ORDER — MEPERIDINE HYDROCHLORIDE 25 MG/ML
25 INJECTION INTRAMUSCULAR; INTRAVENOUS; SUBCUTANEOUS EVERY 30 MIN PRN
Status: CANCELLED | OUTPATIENT
Start: 2017-09-20

## 2017-09-13 RX ORDER — MEPERIDINE HYDROCHLORIDE 25 MG/ML
25 INJECTION INTRAMUSCULAR; INTRAVENOUS; SUBCUTANEOUS
Status: CANCELLED
Start: 2017-09-20

## 2017-09-13 RX ORDER — SODIUM CHLORIDE 9 MG/ML
1000 INJECTION, SOLUTION INTRAVENOUS CONTINUOUS PRN
Status: CANCELLED
Start: 2017-09-20

## 2017-09-13 RX ORDER — EPINEPHRINE 0.3 MG/.3ML
0.3 INJECTION SUBCUTANEOUS EVERY 5 MIN PRN
Status: CANCELLED | OUTPATIENT
Start: 2017-09-20

## 2017-09-13 RX ORDER — DIPHENHYDRAMINE HYDROCHLORIDE 50 MG/ML
50 INJECTION INTRAMUSCULAR; INTRAVENOUS
Status: CANCELLED
Start: 2017-09-20

## 2017-09-13 RX ORDER — LORAZEPAM 2 MG/ML
0.5 INJECTION INTRAMUSCULAR EVERY 4 HOURS PRN
Status: CANCELLED
Start: 2017-09-20

## 2017-09-13 ASSESSMENT — PAIN SCALES - GENERAL: PAINLEVEL: MILD PAIN (3)

## 2017-09-13 NOTE — PROGRESS NOTES
[facility] Clinic Visit  9/13/2017         Disease and Treatment History:  1. Complicated patient with history of Rheumatoid Arthritis status post long term treatment with methotrexate with recent significant complicated course with cardiac arrest, admission with hypotension, sepsis, ICU stay and intubation with subsequent additional readmission from TCU in 6/2017 for FUO with extensive work-up with eventual finding of progression cytopenias with eventual lymphoma diagnosis  2. Bone Marrow Biopsy: 7/12/2017: Highly suspicious for involvement by B cell lymphoma with foci of large atypical CD20+ cells  3. PET CT 7/19/2017: Extensive activity: Right paratracheal lesion with SUV 28, many liver lesions with SUV 15, cardiac lesion intraarterial septum, numerous bone lesions.  4. 7/21 Liver Biopsy: Consistent with Diffuse Large B Cell Lymphoma non-germinal center phenotype  -- FISH for myc, bcl2, bcl6 negative for double-hit lymphoma  5. IPI based on Age < 60 (0 points) + PS 2 (1 + point) + Stage IV Disease (1 point) + Extranodal disease > 1 site (1 point) + elevated LDH (1 point) = 4 Poor Risk Disease  6. Cycle 1 given as R-EPOCH Day 1 = 7/27/2017  -- complications of transfusion dependence and need for acute rehab placement (likely more result of extensive hospital stays)  - LP negative for CNS involvement 8/23/2017  7. Cycle #2: Transition to R-CHOP Day 1 = 8/22/2017  - Day 15 high dose MTX for CNS prophylaxis  - complicated by significant fluid overload and PICC associated DVT      HPI: Amelia was discharged on Monday with plan for follow-up today with delay of chemo due to complications and deconditioning again inpatient and lower counts. She notes she is feeling slightly lower today. More fatigued. Does note her neuropathy in her foot is a little worse after the methotrexate. Notes her weight is down, her edema is improved, no orthopnea, no SOB, no major MI. Notes her left arm wound is improving. Notes no bleeding  Tolerating the lovenox shots. No new rash    Physical Exam:  /81 (BP Location: Right arm, Patient Position: Sitting, Cuff Size: Adult Regular)  Pulse 62  Temp 98.4  F (36.9  C) (Oral)  Resp 18  Wt 56.7 kg (124 lb 14.4 oz)  SpO2 94%  BMI 26.1 kg/m2  Gen: Fatigued but non-toxic appearing. KPS 70-80  HEENT: one small ulcer at the base of her front lower tooth gum line. No thrush  Lungs: slightly diminished in the bases but otherwise clear  CV: Regular on my exam today with 3/6 KRISTY at LUSB and LLSB  Abd: soft, NT  Ext: no edema. Left forarm wound covered but able to see picture her  took a little less than a week ago. This showed the more proximal lesion a little deeper and the more distal lesion smaller and granulating in    Labs:    Results for FIDELIA MIDDLETON (MRN [id number]) as of 9/13/2017 13:42   Ref. Range 9/13/2017 11:21   Sodium Latest Ref Range: 133 - 144 mmol/L 138   Potassium Latest Ref Range: 3.4 - 5.3 mmol/L 4.4   Chloride Latest Ref Range: 94 - 109 mmol/L 101   Carbon Dioxide Latest Ref Range: 20 - 32 mmol/L 31   Urea Nitrogen Latest Ref Range: 7 - 30 mg/dL 19   Creatinine Latest Ref Range: 0.52 - 1.04 mg/dL 0.61   GFR Estimate Latest Ref Range: >60 mL/min/1.7m2 >90   GFR Estimate If Black Latest Ref Range: >60 mL/min/1.7m2 >90   Calcium Latest Ref Range: 8.5 - 10.1 mg/dL 9.4   Anion Gap Latest Ref Range: 3 - 14 mmol/L 6   Phosphorus Latest Ref Range: 2.5 - 4.5 mg/dL 3.4   Glucose Latest Ref Range: 70 - 99 mg/dL 118 (H)   WBC Latest Ref Range: 4.0 - 11.0 10e9/L 7.5   Hemoglobin Latest Ref Range: 11.7 - 15.7 g/dL 8.8 (L)   Hematocrit Latest Ref Range: 35.0 - 47.0 % 26.9 (L)   Platelet Count Latest Ref Range: 150 - 450 10e9/L 85 (L)   RBC Count Latest Ref Range: 3.8 - 5.2 10e12/L 2.56 (L)   MCV Latest Ref Range: 78 - 100 fl 105 (H)   MCH Latest Ref Range: 26.5 - 33.0 pg 34.4 (H)   MCHC Latest Ref Range: 31.5 - 36.5 g/dL 32.7   RDW Latest Ref Range: 10.0 - 15.0 % 26.6 (H)   Diff Method  Unknown Automated Method   % Neutrophils Latest Units: % 87.0   % Lymphocytes Latest Units: % 4.4   % Monocytes Latest Units: % 6.3   % Eosinophils Latest Units: % 0.1   % Basophils Latest Units: % 0.3   % Immature Granulocytes Latest Units: % 1.9   Nucleated RBCs Latest Ref Range: 0 /100 1 (H)   Absolute Neutrophil Latest Ref Range: 1.6 - 8.3 10e9/L 6.5   Absolute Lymphocytes Latest Ref Range: 0.8 - 5.3 10e9/L 0.3 (L)   Absolute Monocytes Latest Ref Range: 0.0 - 1.3 10e9/L 0.5   Absolute Eosinophils Latest Ref Range: 0.0 - 0.7 10e9/L 0.0   Absolute Basophils Latest Ref Range: 0.0 - 0.2 10e9/L 0.0   Abs Immature Granulocytes Latest Ref Range: 0 - 0.4 10e9/L 0.1   Absolute Nucleated RBC Unknown 0.1     Xa level pending    A/P: 57 yo woman with history of rhematoid arthritis and recent diagnosis of stage IVB high risk aggressive Diffuse large B cell lymphoma    1. Lymphoma: Got cycle #1 in the hospital and I chose R-EPOCH for infusional nature due to possible intracardiac involvement and also extensive disease to allow for slower lymphoma kill. Tolerated this well, aside from significant cytopenias (which were present at diagnosis). Moving forward planned for transition to R-CHOP to finish out a total of 6 cycles of chemotherapy (1 R-EPOCH and 5 R-CHOP) with High Dose MTX on Day 15 of cycle 2,4, and 6 due to high IPI. Status post cycle #2 with low counts on 9/11 in the hospital so planned to delay cycle #3 to next week. Will plan cycle #3 and then follow-up PET CT. Plan to do peripheral IV for chemo if possible due to PICC associated clot and desire to avoid keeping PICC in  -- Finishing oral leucovorin due to slow MTX clearance. Had been debating whether or not we will do the next round of MTX for CNS prophy or transition to IT chemo instead due to the complications. Knowing the worsening of her neuropathy with the MTX I think we will plan on doing the IT prophy instead. Will sort out a schedule but will probably  plan on 2-3 doses of IT prophy in place of the MTX  - Stop allopurinol    2. ID: . Remains on acyclovir and fluconazole. Stop levaquin since ANC ok    3. Rheumatoid arthritis: is on prednisone 5 mg daily. Will continue this in between her chemo sessions. Due to see rheumatology on Friday this week    4. CV: A. Fib. On digoxin and atenolol. Now on lovenox for DVT so anticoagulated. Xa level pending from today    5. PICC Associated RUE DVT: on therapeutic lovenox. Plan for repeat ultrasound in 1 month and if cleared will stop lovenox    Final Plan:    Stop taking:    Thiamine    Allopurinol    levaquin    Drop fluconazole to 100 mg daily    Schedule PET CT for around 10/9    Jennifer on 10/11    Schedule next R-CHOPs for 10/11 and 11/1 and 11/22      Lenore Rodriguez

## 2017-09-13 NOTE — TELEPHONE ENCOUNTER
.ED/UC/IP follow up phone call:  DOS:  9/11/17  Lymphoma, diffuse large B cell lymphoma    RN please call to follow up.    Number of ED visits in past 12 months = 0/7

## 2017-09-13 NOTE — MR AVS SNAPSHOT
After Visit Summary   9/13/2017    Amelia Michel    MRN: [id number]           Patient Information     Date Of Birth          1960        Visit Information        Provider Department      9/13/2017 12:00 PM Lenore Rodriguez MD [facility] Blood and Marrow Transplant        Today's Diagnoses     Acute deep vein thrombosis (DVT) of right upper extremity, unspecified vein (H)    -  1    Diffuse large B-cell lymphoma, unspecified body region (H)        Atrial tachycardia (H)              [facility] ([facility])  [address] [place] [address]  Phone: [phone]  Clinic Hours:   Monday-Thursday:7am to 7pm   Friday: 7am to 5pm   Weekends and holidays:    8am to noon (in general)  If your fever is 100.5  or greater,   call the clinic.  After hours call the   hospital at [phone] or   [phone]. Ask for the BMT   fellow on-call           Care Instructions    Stop taking:    Thiamine    Allopurinol    levaquin    Drop fluconazole to 100 mg daily    Schedule PET CT for around 10/9    [place] on 10/11    Schedule next R-CHOPs for 10/11 and 11/1 and 11/22          Follow-ups after your visit        Your next 10 appointments already scheduled     Sep 15, 2017 10:00 AM CDT   (Arrive by 9:45 AM)   New Patient Visit with Pj Cunha MD   [facility] Rheumatology ([facility])    [address] [place] [address]  3rd [facility] [id number] [phone]            Sep 20, 2017  8:30 AM CDT   [facility] Lab Draw with  [facility] LAB DRAW   [facility] Lab Draw ([facility])    [address] [place] [id number] [phone]            Sep 20, 2017  9:00 AM CDT   Infusion 360 with  ONCOLOGY INFUSION, UC 26 ATC   [facility] ([facility])    [address] [place] [id number] [phone]            Sep 27, 2017  1:00 PM CDT   (Arrive by  12:45 PM)   Implanted Defibulator with Uc Cv Device 1   [facility] ([facility] [place])    [address] [facility]  3rd [facility] [id number] [phone]            Sep 27, 2017  1:30 PM CDT   (Arrive by 1:15 PM)   RETURN ARRHYTHMIA with Juan Eaton MD   [facility] ([facility])    [address]  3rd [facility] [id number] [phone]            Oct 02, 2017  9:30 AM CDT   (Arrive by 9:15 AM)   New Patient Visit with Archana Green PA-C   [facility] Urology and [facility] for Prostate and Urologic C[facility] ([facility])    [address]  4th [facility] [id number] [phone]              Future tests that were ordered for you today     Open Future Orders        Priority Expected Expires Ordered    PET Oncology Whole Body Routine 10/9/2017 9/13/2018 9/13/2017            Who to contact     If you have questions or need follow up information about today's clinic visit or your schedule please contact [facility] BLOOD [facility]ARROW [facility]ANSPL[facility] directly at [phone].  Normal or non-critical lab and imaging results will be communicated to you by MyChart, letter or phone within 4 business days after the clinic has received the results. If you do not hear from us within 7 days, please contact the clinic through [employer] or phone. If you have a critical or abnormal lab result, we will notify you by phone as soon as possible.  Submit refill requests through [employer] or call your pharmacy and they will forward the refill request to us. Please allow 3 business days for your refill to be completed.          Additional Information About Your Visit        [employer] Information     [employer] gives you secure access to your electronic health record. If you see a primary care provider, you can also send messages to your care team and make appointments. If you have questions, please call your primary care clinic.   If you do not have a primary care provider, please call [phone] and they will assist you.        Care EveryWhere ID     This is your Care EveryWhere ID. This could be used by other organizations to access your [place] medical records  [id number]        Your Vitals Were     Pulse Temperature Respirations Pulse Oximetry BMI (Body Mass Index)       62 98.4  F (36.9  C) (Oral) 18 94% 26.1 kg/m2        Blood Pressure from Last 3 Encounters:   09/13/17 131/81   09/11/17 133/42   09/06/17 118/68    Weight from Last 3 Encounters:   09/13/17 56.7 kg (124 lb 14.4 oz)   09/11/17 61.5 kg (135 lb 9.6 oz)   09/06/17 54.4 kg (119 lb 14.4 oz)              We Performed the Following     Basic metabolic panel     CBC with platelets differential     Heparin 10a Level     Phosphorus          Today's Medication Changes          These changes are accurate as of: 9/13/17  1:52 PM.  If you have any questions, ask your nurse or doctor.               These medicines have changed or have updated prescriptions.        Dose/Directions    fluconazole 100 MG tablet   Commonly known as:  DIFLUCAN   This may have changed:    - medication strength  - how much to take   Used for:  Diffuse large B-cell lymphoma, unspecified body region (H)   Changed by:  Lenore Rodriguez MD        Dose:  100 mg   Take 1 tablet (100 mg) by mouth daily   Quantity:  30 tablet   Refills:  0         Stop taking these medicines if you haven't already. Please contact your care team if you have questions.     allopurinol 300 MG tablet   Commonly known as:  ZYLOPRIM   Stopped by:  Lenore Rodriguez MD           levofloxacin 250 MG tablet   Commonly known as:  LEVAQUIN   Stopped by:  Lenore Rodriguez MD           thiamine 50 MG Tabs   Stopped by:  Lenore Rodriguez MD                Where to get your medicines      These medications were sent to [facility] [place], [place] - [address] [facility] [address] [place] [address] [address],  [facility] [address]    Hours:  TRANSPLANT PHONE NUMBER [phone] Phone:  [phone]     acyclovir 400 MG tablet    atenolol 25 MG tablet    digoxin 125 MCG tablet    fluconazole 100 MG tablet    predniSONE 5 MG tablet                Recent Review Flowsheet Data     BMT Recent Results Latest Ref Rng & Units 9/9/2017 9/10/2017 9/10/2017 9/10/2017 9/10/2017 9/11/2017 9/13/2017    WBC 4.0 - 11.0 10e9/L - - 9.8 - - 6.6 7.5    Hemoglobin 11.7 - 15.7 g/dL - - 7.5(L) - - 7.9(L) 8.8(L)    Platelet Count 150 - 450 10e9/L - - 54(L) - - 56(L) 85(L)    Platelets 150 - 450 10:9/L - - - - - - -    Neutrophils (Absolute) 1.6 - 8.3 10e9/L - - 9.3(H) - - 6.3 6.5    INR 0.86 - 1.14 - - - - - - -    Sodium 133 - 144 mmol/L - - 144 - 143 141 138    Potassium 3.4 - 5.3 mmol/L - - 3.7 - 3.9 3.4 4.4    Chloride 94 - 109 mmol/L - - 108 - 104 100 101    Glucose 70 - 99 mg/dL 193(H) 176(H) 144(H) 142(H) 150(H) 78 118(H)    Urea Nitrogen 7 - 30 mg/dL - - 25 - 25 22 19    Creatinine 0.52 - 1.04 mg/dL - - 0.47(L) - 0.52 0.53 0.61    Calcium (Total) 8.5 - 10.1 mg/dL - - 6.4(L) - 7.8(L) 8.0(L) 9.4    Protein (Total) 6.8 - 8.8 g/dL - - - - - 6.0(L) -    Albumin 3.4 - 5.0 g/dL - - - - - 3.1(L) -    Bilirubin (Direct) 0.0 - 0.2 mg/dL - - - - - 0.2 -    Alkaline Phosphatase 40 - 150 U/L - - - - - 89 -    AST 0 - 45 U/L - - - - - 58(H) -    ALT 0 - 50 U/L - - - - - 148(H) -    MCV 78 - 100 fl - - 106(H) - - 105(H) 105(H)               Primary Care Provider Office Phone # Fax #    Comfort Sabillon [phone] [address] [facility] [address]        Equal Access to Services     CATINA HEAD : Hadii laurita Flores, waerasto ibarra, qaybta kaalmaalana bae, durga inman. [facility] [phone].    ATENCIÓN: Si habla español, tiene a sarmiento disposición servicios gratuitos de asistencia lingüística. Taylor al [phone].    We comply with applicable federal civil rights laws and [place]  laws. We do not discriminate on the basis of race, color, national origin, age, disability sex, sexual orientation or gender identity.            Thank you!     Thank you for choosing [facility] BLOOD AND MARROW TRANSPLANT  for your care. Our goal is always to provide you with excellent care. Hearing back from our patients is one way we can continue to improve our services. Please take a few minutes to complete the written survey that you may receive in the mail after your visit with us. Thank you!             Your Updated Medication List - Protect others around you: Learn how to safely use, store and throw away your medicines at www.disposemymeds.org.          This list is accurate as of: 9/13/17  1:52 PM.  Always use your most recent med list.                   Brand Name Dispense Instructions for use Diagnosis    acetaminophen 325 MG tablet    TYLENOL     Take 2 tablets (650 mg) by mouth every 4 hours as needed for mild pain, fever or headaches    Diffuse large B-cell lymphoma of extranodal site (H)       acyclovir 400 MG tablet    ZOVIRAX    60 tablet    Take 1 tablet (400 mg) by mouth 2 times daily    Diffuse large B-cell lymphoma, unspecified body region (H)       ascorbic acid 500 MG Tabs     30 tablet    Take 1 tablet (500 mg) by mouth daily    Diffuse large B-cell lymphoma, unspecified body region (H)       atenolol 25 MG tablet    TENORMIN    60 tablet    Take 1 tablet (25 mg) by mouth 2 times daily    Atrial tachycardia (H)       calcium polycarbophil 625 MG tablet    FIBERCON     Take 1 tablet by mouth 2 times daily        calcium-vitamin D 600-400 MG-UNIT per tablet    CALTRATE    60 tablet    Take 2 tablets by mouth every morning    Diffuse large B-cell lymphoma, unspecified body region (H)       digoxin 125 MCG tablet    LANOXIN    30 tablet    Take 1 tablet (125 mcg) by mouth daily    Atrial tachycardia (H)       enoxaparin 60 MG/0.6ML injection    LOVENOX    60 Syringe    Inject 0.6 mLs (60 mg)  Subcutaneous every 12 hours for 30 days    Acute deep vein thrombosis (DVT) of right upper extremity, unspecified vein (H)       fluconazole 100 MG tablet    DIFLUCAN    30 tablet    Take 1 tablet (100 mg) by mouth daily    Diffuse large B-cell lymphoma, unspecified body region (H)       furosemide 20 MG tablet    LASIX    5 tablet    Take 1 tablet (20 mg) by mouth daily for 5 days    Other hypervolemia       gabapentin 100 MG capsule    NEURONTIN    120 capsule    Take 2 capsules (200 mg) by mouth 3 times daily    Critical illness myopathy       lactobacillus rhamnosus (GG) capsule     60 capsule    Take 1 capsule by mouth 2 times daily    Physical deconditioning       leucovorin 15 MG Tabs     12 tablet    Take 1 tablet (15 mg) by mouth every 6 hours for 3 days    Diffuse large B-cell lymphoma of extranodal site (H)       multivitamin, therapeutic with minerals Tabs tablet     30 each    Take 1 tablet by mouth daily    Diffuse large B-cell lymphoma, unspecified body region (H)       ondansetron 8 MG ODT tab    ZOFRAN-ODT    20 tablet    Take 1 tablet (8 mg) by mouth every 8 hours as needed    Diffuse large B-cell lymphoma of extranodal site (H)       potassium & sodium phosphates 280-160-250 MG Packet    NEUTRA-PHOS    60 packet    Take 1 packet by mouth 2 times daily    Hypophosphatemia       potassium chloride SA 20 MEQ CR tablet    K-DUR/KLOR-CON M    60 tablet    Take 2 tablets (40 mEq) by mouth daily while taking furosemide (Lasix). You can decrease potassium to 20 mEq daily once off Lasix.    Diffuse large B-cell lymphoma, unspecified body region (H)       predniSONE 5 MG tablet    DELTASONE    30 tablet    Take 1 tablet (5 mg) by mouth daily    Diffuse large B-cell lymphoma, unspecified body region (H)       traMADol 50 MG tablet    ULTRAM    30 tablet    Take 1 tablet (50 mg) by mouth every 6 hours as needed for moderate pain    Diffuse large B-cell lymphoma of extranodal site (H)

## 2017-09-13 NOTE — PATIENT INSTRUCTIONS
Stop taking:    Thiamine    Allopurinol    levaquin    Drop fluconazole to 100 mg daily    Schedule PET CT for around 10/9    [place] on 10/11    Schedule next R-CHOPs for 10/11 and 11/1 and 11/22

## 2017-09-13 NOTE — NURSING NOTE
"Oncology Rooming Note    September 13, 2017 12:23 PM   Amelia Michel is a 56 year old female who presents for:    Chief Complaint   Patient presents with     Blood Draw     labs drawn by vpt by rn.  vs taken.     Oncology Clinic Visit     DLBCL     Initial Vitals: /81 (BP Location: Right arm, Patient Position: Sitting, Cuff Size: Adult Regular)  Pulse 62  Temp 98.4  F (36.9  C) (Oral)  Resp 18  Wt 56.7 kg (124 lb 14.4 oz)  SpO2 94%  BMI 26.1 kg/m2 Estimated body mass index is 26.1 kg/(m^2) as calculated from the following:    Height as of 9/7/17: 1.473 m (4' 10\").    Weight as of this encounter: 56.7 kg (124 lb 14.4 oz). Body surface area is 1.52 meters squared.  Mild Pain (3) Comment: Data Unavailable   No LMP recorded. Patient is postmenopausal.  Allergies reviewed: Yes  Medications reviewed: Yes    Medications: MEDICATION REFILLS NEEDED TODAY  Pharmacy name entered into [facility]:    [facility] [place], [place] - [address] [facility] [place], [place] - [address] [place] [address] PHARMACY [facility] [place] [place] [facility] [address]  [facility]    Clinical concerns: Fluconazole, Acyclovir, Atenolol, Digoxin, Prednisone, Levaquin, Allopurinol, and Folvite refills requested.     5 minutes for nursing intake (face to face time)     CONCEPCION CARBONE CMA              "

## 2017-09-13 NOTE — NURSING NOTE
Chief Complaint   Patient presents with     Blood Draw     labs drawn by vpt by rn.  vs taken.     Labs drawn by vpt by rn.  Vital signs taken.  Pt checked in to next appointment.  Monalisa Byrd RN

## 2017-09-14 NOTE — PROGRESS NOTES
Prior Authorization Approval    Ondansetron 8mg ODT  Qty: 20  Day Supply: 2  Diagnosis: Diffuse large B-cell lymphoma of extranodal site (H) (C83.39)    Expected copay: 0.00  Effective Dates: 09/14/2017 - 09/14/2018    Insurance: [employer]  Phone: [phone] opt. 2  ID: [id number]  Submitted via: [facility] Angel  Pharmacy Liaison  Ph: [phone] Page: [phone]

## 2017-09-15 ENCOUNTER — OFFICE VISIT (OUTPATIENT)
Dept: RHEUMATOLOGY | Facility: CLINIC | Age: 57
End: 2017-09-15
Attending: INTERNAL MEDICINE
Payer: COMMERCIAL

## 2017-09-15 VITALS
HEIGHT: 58 IN | DIASTOLIC BLOOD PRESSURE: 69 MMHG | TEMPERATURE: 98 F | BODY MASS INDEX: 25.19 KG/M2 | SYSTOLIC BLOOD PRESSURE: 137 MMHG | WEIGHT: 120 LBS | HEART RATE: 64 BPM

## 2017-09-15 DIAGNOSIS — M05.9 SEROPOSITIVE RHEUMATOID ARTHRITIS (H): Primary | ICD-10-CM

## 2017-09-15 PROCEDURE — 99212 OFFICE O/P EST SF 10 MIN: CPT | Mod: ZF

## 2017-09-15 ASSESSMENT — PAIN SCALES - GENERAL: PAINLEVEL: NO PAIN (0)

## 2017-09-15 NOTE — PATIENT INSTRUCTIONS
Try to either taper (2.5 mg daily for 2 weeks) or stop prednisone. It's OK if your joints don't tolerate it.

## 2017-09-15 NOTE — LETTER
9/15/2017       RE: Amelia Michel  [address]  [place] [address]     Dear Colleague,    Thank you for referring your patient, Amelia Michel, to the [facility] RHEUMATOLOGY at [facility]. Please see a copy of my visit note below.    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 Rituximab is FDA-approved for RA and is quite effective. Cyclophosphamide also is a quite potent immunosuppressant. Her arthritis is clinically in remission and thus I asked her to discontinue her prednisone after a quick taper in order to minimize long-term side effects and any unnecessary immunosuppression.    -discontinue prednisone with alternating-day dosing for a few weeks  -follow up in late winter or spring after chemotherapy finished, further therapies will depend on status of lymphoma and disease activity  -should get DEXA scan at some point to evaluate bone density and consider whether bisphosphonate indicated (had 7-8 years of Fosamax in early 2000's)    Seen and discussed with Dr. Domínguez.    Pj Cunha MD  Rheumatology Fellow  [phone]      Attending Note: I saw and evaluated the patient with Dr. Cunha. I agree with the assessment and plan.    Maribell Domínguez MD              Active Problem List:     Patient Active Problem List    Diagnosis Date Noted     DLBCL  (diffuse large B cell lymphoma) (H) 09/07/2017     Priority: Medium     Physical deconditioning 08/12/2017     Priority: Medium     Febrile neutropenia (H) 08/08/2017     Priority: Medium     Diffuse large B cell lymphoma (H) 08/04/2017     Priority: Medium     Diffuse large B-cell lymphoma of extranodal site (H) 07/27/2017     Priority: Medium     Sepsis (H) 06/19/2017     Priority: Medium     Wound of left upper extremity 06/19/2017     Priority: Medium     Critical illness myopathy 06/16/2017     Priority: Medium     Acute respiratory failure with hypoxia (H) 06/09/2017     Priority: Medium     Hypertension      Priority: Medium     Cardiogenic shock (H) 05/29/2017     Priority: Medium     Atrial tachycardia (H) 05/16/2017     Priority: Medium     Lymphedema of both lower extremities 04/04/2017     Priority: Medium     Other rheumatoid arthritis with rheumatoid factor of multiple sites (H) 05/02/2016     Priority: Medium     Hyperlipidemia LDL goal <130 02/22/2011     Priority: Medium     HTN (hypertension)      Priority: Medium     Primary pulmonary hypertension (H)      Priority: Medium     Seeing [facility].        Health Care Home 08/21/2017     Priority: Low     *See Letters for [facility] Care Plan: My Access Plan              History of Present Illness:   Amelia Michel is a 56 year old female who presents for evaluation of longstanding rheumatoid arthritis. We initially met when she was hospitalized in July after a cardiac arrest that at the time was of unclear etiology. She was admitted in late May after an out of hospital arrest with ROSC in the field. She was initially in atrial fibrillation and given multiple IV medications including some amiodarone and developed what sounds like a wide complex tachycardia and was shocked out of it. During her hospitalization she had severe cytopenias requiring transfusions as well as fevers of unknown origin. She ultimately had a bone marrow and liver bioies which showed  diffuse large B cell lymphoma. A PET scan showed extensive involvement including a possible cardiac lesion. She received a cycle of R-EPOCH 7/27/17 that was complicated by severe cytopenias requiring transfusions and a rehab stay. Her LP was negative for CNS involvement 8/23/17 and was transitioned to her first cycle of R-CHOP 8/22/17 with high-dose methotrexate on day 15 for CNS prophylaxis. She has developed a severe neuropathy in the digits of her upper and lower extremities that has been attributed to the methotrexate.     Her rheumatoid arthritis had been managed by Dr. Dumas for many years. She had relatively inactive disease prior to her arrest but was on methotrexate 10 mg PO per week, Arava 10 mg daily, Plaquenil 200 mg twice per day, and prednisone 3 mg daily. She has had partial fusion of her right wrist. After discharge from the hospital she was put on prednisone 5 mg daily as monotherapy. Her disease is completely inactive in the setting of her chemotherapy.    Today she feels well. She tells me that the plan for her lymphoma is 6 cycles of R-CHOP with a repeat PET after cycle 3.          Review of Systems (other than HPI):   Constitutional: negative  Skin: negative  Eyes: negative  Ears/Nose/Throat: negative  Respiratory: negative  Cardiovascular: negative  Gastrointestinal: negative  Genitourinary: negative  Musculoskeletal: negative  Neurologic: negative  Psychiatric: negative  Hematologic/Lymphatic/Immunologic: negative  Endocrine: negative          Past Medical History:     Past Medical History:   Diagnosis Date     A-fib (H)      Cardiac arrest (H)      Hypertension      Neuropathy (H)      Rheumatoid arthritis(714.0)      Past Surgical History:   Procedure Laterality Date     ANESTHESIA CARDIOVERSION N/A 5/17/2017    Procedure: ANESTHESIA CARDIOVERSION;  ANESTHESIA CARDIOVERSION;  Surgeon: GENERIC ANESTHESIA PROVIDER;  Location: [facility] OR     ARTHRODESIS WRIST  2000    Right wrist     BONE MARROW  ASPIRATE &BIOPSY  7/12/2017          FOOT SURGERY      4 left and 2 right     RELEASE CARPAL TUNNEL BILATERAL       REPAIR VENTRICULAR SEPTAL DEFECT  1969          Social History:     Social History     Occupational History      [employer]     Social History Main Topics     Smoking status: Never Smoker     Smokeless tobacco: Never Used     Alcohol use Yes      Comment: Glass of wine two evenings a night     Drug use: No     Sexual activity: Not on file          Family History:     Family History   Problem Relation Age of Onset     Breast Cancer Mother      Hypertension Mother      Alzheimer Disease Mother      Hypertension Father      Blood Disease Father      Lymphoa     Circulatory Father      A Fib     DIABETES Paternal Grandmother    Has a paternal relative with RA.         Allergies:     Allergies   Allergen Reactions     Blood Transfusion Related (Informational Only) Hives     Hives with platelets. Give benadryl premedication.     Metoprolol Other (See Comments)     Pt and  report that metoprolol does not work for her and also reports feeling unwell with this medication. She has been able to tolerate atenolol, which as worked in controlling her HR.      No Clinical Screening - See Comments      Penicillin Allergy Skin Test not performed, see antimicrobial management team progress note 7/5/17.       Penicillins      Tape [Adhesive Tape] Rash          Medications:     Current Outpatient Prescriptions   Medication Sig Dispense Refill     fluconazole (DIFLUCAN) 100 MG tablet Take 1 tablet (100 mg) by mouth daily 30 tablet 0     acyclovir (ZOVIRAX) 400 MG tablet Take 1 tablet (400 mg) by mouth 2 times daily 60 tablet 0     atenolol (TENORMIN) 25 MG tablet Take 1 tablet (25 mg) by mouth 2 times daily 60 tablet 0     digoxin (LANOXIN) 125 MCG tablet Take 1 tablet (125 mcg) by mouth daily 30 tablet 0     predniSONE (DELTASONE) 5 MG tablet Take 1 tablet (5 mg) by mouth daily 30 tablet 0  "    traMADol (ULTRAM) 50 MG tablet Take 1 tablet (50 mg) by mouth every 6 hours as needed for moderate pain 30 tablet 0     acetaminophen (TYLENOL) 325 MG tablet Take 2 tablets (650 mg) by mouth every 4 hours as needed for mild pain, fever or headaches       leucovorin 15 MG TABS Take 1 tablet (15 mg) by mouth every 6 hours for 3 days 12 tablet 1     potassium chloride SA (K-DUR/KLOR-CON M) 20 MEQ CR tablet Take 2 tablets (40 mEq) by mouth daily while taking furosemide (Lasix). You can decrease potassium to 20 mEq daily once off Lasix. 60 tablet 1     furosemide (LASIX) 20 MG tablet Take 1 tablet (20 mg) by mouth daily for 5 days 5 tablet 1     potassium & sodium phosphates (NEUTRA-PHOS) 280-160-250 MG Packet Take 1 packet by mouth 2 times daily 60 packet 1     enoxaparin (LOVENOX) 60 MG/0.6ML injection Inject 0.6 mLs (60 mg) Subcutaneous every 12 hours for 30 days 60 Syringe 1     ondansetron (ZOFRAN-ODT) 8 MG ODT tab Take 1 tablet (8 mg) by mouth every 8 hours as needed 20 tablet 2     calcium polycarbophil (FIBERCON) 625 MG tablet Take 1 tablet by mouth 2 times daily       gabapentin (NEURONTIN) 100 MG capsule Take 2 capsules (200 mg) by mouth 3 times daily 120 capsule 0     lactobacillus rhamnosus, GG, (CULTURELL) capsule Take 1 capsule by mouth 2 times daily 60 capsule 0     calcium-vitamin D (CALTRATE) 600-400 MG-UNIT per tablet Take 2 tablets by mouth every morning 60 tablet 0     multivitamin, therapeutic with minerals (THERA-VIT-M) TABS tablet Take 1 tablet by mouth daily 30 each 0     ascorbic acid 500 MG TABS Take 1 tablet (500 mg) by mouth daily 30 tablet 0          Physical Exam:   Blood pressure 137/69, pulse 64, temperature 98  F (36.7  C), temperature source Oral, height 1.473 m (4' 10\"), weight 54.4 kg (120 lb), not currently breastfeeding.  Wt Readings from Last 4 Encounters:   09/15/17 54.4 kg (120 lb)   09/13/17 56.7 kg (124 lb 14.4 oz)   09/11/17 61.5 kg (135 lb 9.6 oz)   09/06/17 54.4 kg (119 " lb 14.4 oz)     Constitutional: well-developed, appearing stated age; cooperative  Eyes: normal EOM, PERRLA, vision, conjunctiva, sclera  ENT: normal external ears, nose, hearing, lips, teeth, throat; has small gingival retraction with pallor underneath 1st tooth left of midline in lower jaw; no mucous membrane lesions, normal saliva pool  Neck: no mass or thyroid enlargement  Resp: lungs clear to auscultation  CV: RRR, brisk systolic murmur in aortic position, no rubs or gallops, no edema  GI: no abdominal mass or tenderness, no organomegaly  : not tested  Lymph: no cervical, supraclavicular, or epitrochlear nodes  MS: The TMJ, neck, shoulder, elbow, wrist, MCP/PIP/DIP, spine, hip, knee, ankle, and foot MTP/IP joints were examined. No active synovitis. Normal  strength. No dactylitis,  tenosynovitis, enthesopathy. Has subluxation of MCP's 2-4 bilaterally. Diminished flexion greater than extension left wrist. Right wrist partially fused.   Skin: no nail pitting, rash, nodules or lesions; diffuse alopecia; bandaged wound across medial aspect of left elbow; reviewed pictures on 's cell phone which showed 2 large (1 inch or so) eschars with yellow granulation tissue; no surrounding skin breakdown  Neuro: normal cranial nerves, ambulatory with and without walker, strength is 5/5 throughout, sensation diminished subjectively in lower extremities  Psych: normal judgement, orientation, memory, affect         Data:     Results for orders placed or performed in visit on 09/13/17   CBC with platelets differential   Result Value Ref Range    WBC 7.5 4.0 - 11.0 10e9/L    RBC Count 2.56 (L) 3.8 - 5.2 10e12/L    Hemoglobin 8.8 (L) 11.7 - 15.7 g/dL    Hematocrit 26.9 (L) 35.0 - 47.0 %     (H) 78 - 100 fl    MCH 34.4 (H) 26.5 - 33.0 pg    MCHC 32.7 31.5 - 36.5 g/dL    RDW 26.6 (H) 10.0 - 15.0 %    Platelet Count 85 (L) 150 - 450 10e9/L    Diff Method Automated Method     % Neutrophils 87.0 %    % Lymphocytes 4.4  %    % Monocytes 6.3 %    % Eosinophils 0.1 %    % Basophils 0.3 %    % Immature Granulocytes 1.9 %    Nucleated RBCs 1 (H) 0 /100    Absolute Neutrophil 6.5 1.6 - 8.3 10e9/L    Absolute Lymphocytes 0.3 (L) 0.8 - 5.3 10e9/L    Absolute Monocytes 0.5 0.0 - 1.3 10e9/L    Absolute Eosinophils 0.0 0.0 - 0.7 10e9/L    Absolute Basophils 0.0 0.0 - 0.2 10e9/L    Abs Immature Granulocytes 0.1 0 - 0.4 10e9/L    Absolute Nucleated RBC 0.1    Basic metabolic panel   Result Value Ref Range    Sodium 138 133 - 144 mmol/L    Potassium 4.4 3.4 - 5.3 mmol/L    Chloride 101 94 - 109 mmol/L    Carbon Dioxide 31 20 - 32 mmol/L    Anion Gap 6 3 - 14 mmol/L    Glucose 118 (H) 70 - 99 mg/dL    Urea Nitrogen 19 7 - 30 mg/dL    Creatinine 0.61 0.52 - 1.04 mg/dL    GFR Estimate >90 >60 mL/min/1.7m2    GFR Estimate If Black >90 >60 mL/min/1.7m2    Calcium 9.4 8.5 - 10.1 mg/dL   Phosphorus   Result Value Ref Range    Phosphorus 3.4 2.5 - 4.5 mg/dL   Heparin 10a Level   Result Value Ref Range    Heparin 10A Level 0.71 IU/mL     Hemoglobin   Date Value Ref Range Status   09/13/2017 8.8 (L) 11.7 - 15.7 g/dL Final   09/11/2017 7.9 (L) 11.7 - 15.7 g/dL Final   09/10/2017 7.5 (L) 11.7 - 15.7 g/dL Final     Urea Nitrogen   Date Value Ref Range Status   09/13/2017 19 7 - 30 mg/dL Final   09/11/2017 22 7 - 30 mg/dL Final   09/10/2017 25 7 - 30 mg/dL Final     Sed Rate   Date Value Ref Range Status   06/19/2017 63 (H) 0 - 30 mm/h Final   05/30/2017 34 (H) 0 - 30 mm/h Final   05/29/2017 22 0 - 30 mm/h Final     CRP Inflammation   Date Value Ref Range Status   07/14/2017 83.0 (H) 0.0 - 8.0 mg/L Final   07/12/2017 95.0 (H) 0.0 - 8.0 mg/L Final   07/06/2017 61.0 (H) 0.0 - 8.0 mg/L Final     AST   Date Value Ref Range Status   09/11/2017 58 (H) 0 - 45 U/L Final   09/07/2017 30 0 - 45 U/L Final   09/06/2017 35 0 - 45 U/L Final     Albumin   Date Value Ref Range Status   09/11/2017 3.1 (L) 3.4 - 5.0 g/dL Final   09/07/2017 3.1 (L) 3.4 - 5.0 g/dL Final    09/06/2017 3.4 3.4 - 5.0 g/dL Final     Alkaline Phosphatase   Date Value Ref Range Status   09/11/2017 89 40 - 150 U/L Final   09/07/2017 105 40 - 150 U/L Final   09/06/2017 119 40 - 150 U/L Final     ALT   Date Value Ref Range Status   09/11/2017 148 (H) 0 - 50 U/L Final   09/07/2017 58 (H) 0 - 50 U/L Final   09/06/2017 67 (H) 0 - 50 U/L Final     Rheumatoid Factor   Date Value Ref Range Status   06/10/2017 <20 <20 IU/mL Final   04/27/2016 31 (H) <20 IU/mL Final     Recent Labs   Lab Test  09/13/17   1121  09/11/17   0617  09/10/17   1333  09/10/17   0552   09/07/17   1000  09/06/17   0744   05/15/17   1752   WBC  7.5  6.6   --   9.8   < >  6.5  6.2   < >  4.2   HGB  8.8*  7.9*   --   7.5*   < >  9.1*  9.6*   < >  10.9*   HCT  26.9*  24.2*   --   23.4*   < >  28.4*  29.6*   < >  32.7*   MCV  105*  105*   --   106*   < >  103*  102*   < >  108*   PLT  85*  56*   --   54*   < >  70*  64*   < >  100*   BUN  19  22  25  25   < >  22  17   < >  19   TSH   --    --    --    --    --    --    --    --   1.03   AST   --   58*   --    --    --   30  35   < >   --    ALT   --   148*   --    --    --   58*  67*   < >   --    ALKPHOS   --   89   --    --    --   105  119   < >   --     < > = values in this interval not displayed.     Results for FIDELIA MIDDLETON (MRN [id number]) as of 9/15/2017 11:21   Ref. Range 6/10/2017 08:03   Cyclic Citrullinated Peptide Antibody, IgG Latest Ref Range: <7 U/mL 331 (H)     Reviewed Rheumatology lab flowsheet      Pj Cunha MD, MD

## 2017-09-15 NOTE — PROGRESS NOTES
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 Rituximab is FDA-approved for RA and is quite effective. Cyclophosphamide also is a quite potent immunosuppressant. Her arthritis is clinically in remission and thus I asked her to discontinue her prednisone after a quick taper in order to minimize long-term side effects and any unnecessary immunosuppression.    -discontinue prednisone with alternating-day dosing for a few weeks  -follow up in late winter or spring after chemotherapy finished, further therapies will depend on status of lymphoma and disease activity  -should get DEXA scan at some point to evaluate bone density and consider whether bisphosphonate indicated (had 7-8 years of Fosamax in early 2000's)    Seen and discussed with Dr. Domínguez.    Pj Cunha MD  Rheumatology Fellow  [phone]      Attending Note: I saw and evaluated the patient with Dr. Cunha. I agree with the assessment and plan.    Maribell Domínguez MD              Active Problem List:     Patient Active Problem List    Diagnosis Date Noted     DLBCL (diffuse large B cell lymphoma) (H) 09/07/2017     Priority: Medium     Physical deconditioning 08/12/2017     Priority: Medium     Febrile neutropenia (H) 08/08/2017     Priority: Medium     Diffuse large B cell lymphoma (H) 08/04/2017     Priority: Medium     Diffuse large B-cell lymphoma  of extranodal site (H) 07/27/2017     Priority: Medium     Sepsis (H) 06/19/2017     Priority: Medium     Wound of left upper extremity 06/19/2017     Priority: Medium     Critical illness myopathy 06/16/2017     Priority: Medium     Acute respiratory failure with hypoxia (H) 06/09/2017     Priority: Medium     Hypertension      Priority: Medium     Cardiogenic shock (H) 05/29/2017     Priority: Medium     Atrial tachycardia (H) 05/16/2017     Priority: Medium     Lymphedema of both lower extremities 04/04/2017     Priority: Medium     Other rheumatoid arthritis with rheumatoid factor of multiple sites (H) 05/02/2016     Priority: Medium     Hyperlipidemia LDL goal <130 02/22/2011     Priority: Medium     HTN (hypertension)      Priority: Medium     Primary pulmonary hypertension (H)      Priority: Medium     Seeing [facility].        Health Care Home 08/21/2017     Priority: Low     *See Letters for [facility] Care Plan: My Access Plan              History of Present Illness:   Amelia Michel is a 56 year old female who presents for evaluation of longstanding rheumatoid arthritis. We initially met when she was hospitalized in July after a cardiac arrest that at the time was of unclear etiology. She was admitted in late May after an out of hospital arrest with ROSC in the field. She was initially in atrial fibrillation and given multiple IV medications including some amiodarone and developed what sounds like a wide complex tachycardia and was shocked out of it. During her hospitalization she had severe cytopenias requiring transfusions as well as fevers of unknown origin. She ultimately had a bone marrow and liver bioies which showed diffuse large B cell lymphoma. A PET scan showed extensive involvement including a possible cardiac lesion. She received a cycle of R-EPOCH 7/27/17 that was complicated by severe cytopenias requiring transfusions and a rehab stay. Her LP was negative for CNS involvement 8/23/17 and was  transitioned to her first cycle of R-CHOP 8/22/17 with high-dose methotrexate on day 15 for CNS prophylaxis. She has developed a severe neuropathy in the digits of her upper and lower extremities that has been attributed to the methotrexate.     Her rheumatoid arthritis had been managed by Dr. Dumas for many years. She had relatively inactive disease prior to her arrest but was on methotrexate 10 mg PO per week, Arava 10 mg daily, Plaquenil 200 mg twice per day, and prednisone 3 mg daily. She has had partial fusion of her right wrist. After discharge from the hospital she was put on prednisone 5 mg daily as monotherapy. Her disease is completely inactive in the setting of her chemotherapy.    Today she feels well. She tells me that the plan for her lymphoma is 6 cycles of R-CHOP with a repeat PET after cycle 3.          Review of Systems (other than HPI):   Constitutional: negative  Skin: negative  Eyes: negative  Ears/Nose/Throat: negative  Respiratory: negative  Cardiovascular: negative  Gastrointestinal: negative  Genitourinary: negative  Musculoskeletal: negative  Neurologic: negative  Psychiatric: negative  Hematologic/Lymphatic/Immunologic: negative  Endocrine: negative          Past Medical History:     Past Medical History:   Diagnosis Date     A-fib (H)      Cardiac arrest (H)      Hypertension      Neuropathy (H)      Rheumatoid arthritis(714.0)      Past Surgical History:   Procedure Laterality Date     ANESTHESIA CARDIOVERSION N/A 5/17/2017    Procedure: ANESTHESIA CARDIOVERSION;  ANESTHESIA CARDIOVERSION;  Surgeon: GENERIC ANESTHESIA PROVIDER;  Location: [facility] OR     ARTHRODESIS WRIST  2000    Right wrist     BONE MARROW ASPIRATE &BIOPSY  7/12/2017          FOOT SURGERY      4 left and 2 right     RELEASE CARPAL TUNNEL BILATERAL       REPAIR VENTRICULAR SEPTAL DEFECT  1969          Social History:     Social History     Occupational History      [employer]     Social History Main  Topics     Smoking status: Never Smoker     Smokeless tobacco: Never Used     Alcohol use Yes      Comment: Glass of wine two evenings a night     Drug use: No     Sexual activity: Not on file          Family History:     Family History   Problem Relation Age of Onset     Breast Cancer Mother      Hypertension Mother      Alzheimer Disease Mother      Hypertension Father      Blood Disease Father      Lymphoa     Circulatory Father      A Fib     DIABETES Paternal Grandmother    Has a paternal relative with RA.         Allergies:     Allergies   Allergen Reactions     Blood Transfusion Related (Informational Only) Hives     Hives with platelets. Give benadryl premedication.     Metoprolol Other (See Comments)     Pt and  report that metoprolol does not work for her and also reports feeling unwell with this medication. She has been able to tolerate atenolol, which as worked in controlling her HR.      No Clinical Screening - See Comments      Penicillin Allergy Skin Test not performed, see antimicrobial management team progress note 7/5/17.       Penicillins      Tape [Adhesive Tape] Rash          Medications:     Current Outpatient Prescriptions   Medication Sig Dispense Refill     fluconazole (DIFLUCAN) 100 MG tablet Take 1 tablet (100 mg) by mouth daily 30 tablet 0     acyclovir (ZOVIRAX) 400 MG tablet Take 1 tablet (400 mg) by mouth 2 times daily 60 tablet 0     atenolol (TENORMIN) 25 MG tablet Take 1 tablet (25 mg) by mouth 2 times daily 60 tablet 0     digoxin (LANOXIN) 125 MCG tablet Take 1 tablet (125 mcg) by mouth daily 30 tablet 0     predniSONE (DELTASONE) 5 MG tablet Take 1 tablet (5 mg) by mouth daily 30 tablet 0     traMADol (ULTRAM) 50 MG tablet Take 1 tablet (50 mg) by mouth every 6 hours as needed for moderate pain 30 tablet 0     acetaminophen (TYLENOL) 325 MG tablet Take 2 tablets (650 mg) by mouth every 4 hours as needed for mild pain, fever or headaches       leucovorin 15 MG TABS Take 1  "tablet (15 mg) by mouth every 6 hours for 3 days 12 tablet 1     potassium chloride SA (K-DUR/KLOR-CON M) 20 MEQ CR tablet Take 2 tablets (40 mEq) by mouth daily while taking furosemide (Lasix). You can decrease potassium to 20 mEq daily once off Lasix. 60 tablet 1     furosemide (LASIX) 20 MG tablet Take 1 tablet (20 mg) by mouth daily for 5 days 5 tablet 1     potassium & sodium phosphates (NEUTRA-PHOS) 280-160-250 MG Packet Take 1 packet by mouth 2 times daily 60 packet 1     enoxaparin (LOVENOX) 60 MG/0.6ML injection Inject 0.6 mLs (60 mg) Subcutaneous every 12 hours for 30 days 60 Syringe 1     ondansetron (ZOFRAN-ODT) 8 MG ODT tab Take 1 tablet (8 mg) by mouth every 8 hours as needed 20 tablet 2     calcium polycarbophil (FIBERCON) 625 MG tablet Take 1 tablet by mouth 2 times daily       gabapentin (NEURONTIN) 100 MG capsule Take 2 capsules (200 mg) by mouth 3 times daily 120 capsule 0     lactobacillus rhamnosus, GG, (CULTURELL) capsule Take 1 capsule by mouth 2 times daily 60 capsule 0     calcium-vitamin D (CALTRATE) 600-400 MG-UNIT per tablet Take 2 tablets by mouth every morning 60 tablet 0     multivitamin, therapeutic with minerals (THERA-VIT-M) TABS tablet Take 1 tablet by mouth daily 30 each 0     ascorbic acid 500 MG TABS Take 1 tablet (500 mg) by mouth daily 30 tablet 0          Physical Exam:   Blood pressure 137/69, pulse 64, temperature 98  F (36.7  C), temperature source Oral, height 1.473 m (4' 10\"), weight 54.4 kg (120 lb), not currently breastfeeding.  Wt Readings from Last 4 Encounters:   09/15/17 54.4 kg (120 lb)   09/13/17 56.7 kg (124 lb 14.4 oz)   09/11/17 61.5 kg (135 lb 9.6 oz)   09/06/17 54.4 kg (119 lb 14.4 oz)     Constitutional: well-developed, appearing stated age; cooperative  Eyes: normal EOM, PERRLA, vision, conjunctiva, sclera  ENT: normal external ears, nose, hearing, lips, teeth, throat; has small gingival retraction with pallor underneath 1st tooth left of midline in lower " jaw; no mucous membrane lesions, normal saliva pool  Neck: no mass or thyroid enlargement  Resp: lungs clear to auscultation  CV: RRR, brisk systolic murmur in aortic position, no rubs or gallops, no edema  GI: no abdominal mass or tenderness, no organomegaly  : not tested  Lymph: no cervical, supraclavicular, or epitrochlear nodes  MS: The TMJ, neck, shoulder, elbow, wrist, MCP/PIP/DIP, spine, hip, knee, ankle, and foot MTP/IP joints were examined. No active synovitis. Normal  strength. No dactylitis,  tenosynovitis, enthesopathy. Has subluxation of MCP's 2-4 bilaterally. Diminished flexion greater than extension left wrist. Right wrist partially fused.   Skin: no nail pitting, rash, nodules or lesions; diffuse alopecia; bandaged wound across medial aspect of left elbow; reviewed pictures on 's cell phone which showed 2 large (1 inch or so) eschars with yellow granulation tissue; no surrounding skin breakdown  Neuro: normal cranial nerves, ambulatory with and without walker, strength is 5/5 throughout, sensation diminished subjectively in lower extremities  Psych: normal judgement, orientation, memory, affect         Data:     Results for orders placed or performed in visit on 09/13/17   CBC with platelets differential   Result Value Ref Range    WBC 7.5 4.0 - 11.0 10e9/L    RBC Count 2.56 (L) 3.8 - 5.2 10e12/L    Hemoglobin 8.8 (L) 11.7 - 15.7 g/dL    Hematocrit 26.9 (L) 35.0 - 47.0 %     (H) 78 - 100 fl    MCH 34.4 (H) 26.5 - 33.0 pg    MCHC 32.7 31.5 - 36.5 g/dL    RDW 26.6 (H) 10.0 - 15.0 %    Platelet Count 85 (L) 150 - 450 10e9/L    Diff Method Automated Method     % Neutrophils 87.0 %    % Lymphocytes 4.4 %    % Monocytes 6.3 %    % Eosinophils 0.1 %    % Basophils 0.3 %    % Immature Granulocytes 1.9 %    Nucleated RBCs 1 (H) 0 /100    Absolute Neutrophil 6.5 1.6 - 8.3 10e9/L    Absolute Lymphocytes 0.3 (L) 0.8 - 5.3 10e9/L    Absolute Monocytes 0.5 0.0 - 1.3 10e9/L    Absolute  Eosinophils 0.0 0.0 - 0.7 10e9/L    Absolute Basophils 0.0 0.0 - 0.2 10e9/L    Abs Immature Granulocytes 0.1 0 - 0.4 10e9/L    Absolute Nucleated RBC 0.1    Basic metabolic panel   Result Value Ref Range    Sodium 138 133 - 144 mmol/L    Potassium 4.4 3.4 - 5.3 mmol/L    Chloride 101 94 - 109 mmol/L    Carbon Dioxide 31 20 - 32 mmol/L    Anion Gap 6 3 - 14 mmol/L    Glucose 118 (H) 70 - 99 mg/dL    Urea Nitrogen 19 7 - 30 mg/dL    Creatinine 0.61 0.52 - 1.04 mg/dL    GFR Estimate >90 >60 mL/min/1.7m2    GFR Estimate If Black >90 >60 mL/min/1.7m2    Calcium 9.4 8.5 - 10.1 mg/dL   Phosphorus   Result Value Ref Range    Phosphorus 3.4 2.5 - 4.5 mg/dL   Heparin 10a Level   Result Value Ref Range    Heparin 10A Level 0.71 IU/mL     Hemoglobin   Date Value Ref Range Status   09/13/2017 8.8 (L) 11.7 - 15.7 g/dL Final   09/11/2017 7.9 (L) 11.7 - 15.7 g/dL Final   09/10/2017 7.5 (L) 11.7 - 15.7 g/dL Final     Urea Nitrogen   Date Value Ref Range Status   09/13/2017 19 7 - 30 mg/dL Final   09/11/2017 22 7 - 30 mg/dL Final   09/10/2017 25 7 - 30 mg/dL Final     Sed Rate   Date Value Ref Range Status   06/19/2017 63 (H) 0 - 30 mm/h Final   05/30/2017 34 (H) 0 - 30 mm/h Final   05/29/2017 22 0 - 30 mm/h Final     CRP Inflammation   Date Value Ref Range Status   07/14/2017 83.0 (H) 0.0 - 8.0 mg/L Final   07/12/2017 95.0 (H) 0.0 - 8.0 mg/L Final   07/06/2017 61.0 (H) 0.0 - 8.0 mg/L Final     AST   Date Value Ref Range Status   09/11/2017 58 (H) 0 - 45 U/L Final   09/07/2017 30 0 - 45 U/L Final   09/06/2017 35 0 - 45 U/L Final     Albumin   Date Value Ref Range Status   09/11/2017 3.1 (L) 3.4 - 5.0 g/dL Final   09/07/2017 3.1 (L) 3.4 - 5.0 g/dL Final   09/06/2017 3.4 3.4 - 5.0 g/dL Final     Alkaline Phosphatase   Date Value Ref Range Status   09/11/2017 89 40 - 150 U/L Final   09/07/2017 105 40 - 150 U/L Final   09/06/2017 119 40 - 150 U/L Final     ALT   Date Value Ref Range Status   09/11/2017 148 (H) 0 - 50 U/L Final    09/07/2017 58 (H) 0 - 50 U/L Final   09/06/2017 67 (H) 0 - 50 U/L Final     Rheumatoid Factor   Date Value Ref Range Status   06/10/2017 <20 <20 IU/mL Final   04/27/2016 31 (H) <20 IU/mL Final     Recent Labs   Lab Test  09/13/17   1121  09/11/17   0617  09/10/17   1333  09/10/17   0552   09/07/17   1000  09/06/17   0744   05/15/17   1752   WBC  7.5  6.6   --   9.8   < >  6.5  6.2   < >  4.2   HGB  8.8*  7.9*   --   7.5*   < >  9.1*  9.6*   < >  10.9*   HCT  26.9*  24.2*   --   23.4*   < >  28.4*  29.6*   < >  32.7*   MCV  105*  105*   --   106*   < >  103*  102*   < >  108*   PLT  85*  56*   --   54*   < >  70*  64*   < >  100*   BUN  19  22  25  25   < >  22  17   < >  19   TSH   --    --    --    --    --    --    --    --   1.03   AST   --   58*   --    --    --   30  35   < >   --    ALT   --   148*   --    --    --   58*  67*   < >   --    ALKPHOS   --   89   --    --    --   105  119   < >   --     < > = values in this interval not displayed.     Results for FIDELIA MIDDLETON (MRN [id number]) as of 9/15/2017 11:21   Ref. Range 6/10/2017 08:03   Cyclic Citrullinated Peptide Antibody, IgG Latest Ref Range: <7 U/mL 331 (H)     Reviewed Rheumatology lab flowsheet

## 2017-09-15 NOTE — MR AVS SNAPSHOT
After Visit Summary   9/15/2017    Amelia Michel    MRN: [id number]           Patient Information     Date Of Birth          1960        Visit Information        Provider Department      9/15/2017 10:00 AM Pj Cunha MD [facility] Rheumatology        Care Instructions    Try to either taper (2.5 mg daily for 2 weeks) or stop prednisone. It's OK if your joints don't tolerate it.              Follow-ups after your visit        Follow-up notes from your care team     Return in about 5 months (around 2/15/2018).      Your next 10 appointments already scheduled     Sep 20, 2017  8:30 AM CDT   [facility] Lab Draw with  [facility] DRAW   [facility]    [address]  [address] [facility] [address] [phone]            Sep 20, 2017  9:00 AM CDT   Infusion 360 with [facility] ONCOLOGY INFUSION, UC 26 ATC   [facility] ([facility])    [address]  [address] [facility] [address] [phone]            Sep 27, 2017  1:00 PM CDT   (Arrive by 12:45 PM)   Implanted Defibulator with Uc Cv Device 1   [facility] ([facility])    [address]  [address] [facility] [address] [phone]            Sep 27, 2017  1:30 PM CDT   (Arrive by 1:15 PM)   RETURN ARRHYTHMIA with Juan Eaton MD   [facility] ([facility])    [address]  [address] [facility] [address] [phone]            Oct 02, 2017  9:30 AM CDT   (Arrive by 9:15 AM)   New Patient Visit with Archana Green PA-C   [facility] Prostate and Urologic Cancers ([facility])    [address] [place] [address]  [address] [facility] [address] [phone]            Jan 12, 2018  8:00 AM CST   (Arrive by 7:45 AM)   Return Visit with Pj Cunha MD   [facility]ology ([facility]    [address]  [address] [facility] [address] [phone]              Who to contact     If you have questions or need follow up information about today's clinic visit or your schedule please contact [facility] RHEUMATOLOGY directly at [phone].  Normal or non-critical lab and imaging results will be communicated to you by MyChart, letter or phone within 4 business days after the clinic has received the results. If you do not hear from us within 7 days, please contact the clinic through [employer] or phone. If you have a critical or abnormal lab result, we will notify you by phone as soon as possible.  Submit refill requests through [employer] or call your pharmacy and they will forward the refill request to us. Please allow 3 business days for your refill to be completed.          Additional Information About Your Visit        MyChart Information     [employer] gives you secure access to your electronic health record. If you see a primary care provider, you can also send messages to your care team and make appointments. If you have questions, please call your primary care clinic.  If you do not have a primary care provider, please call [phone] and they will assist you.        Care EveryWhere ID     This is your Care EveryWhere ID. This could be used by other organizations to access your [facility] medical records  [id number]        Your Vitals Were     Pulse Temperature Height BMI (Body Mass Index)          64 98  F (36.7  C) (Oral) 1.473 m (4' 10\") 25.08 kg/m2         Blood Pressure from Last 3 Encounters:   09/15/17 137/69   09/13/17 131/81   09/11/17 133/42    Weight from Last 3 Encounters:   09/15/17 54.4 kg (120 lb)   09/13/17 56.7 kg (124 lb 14.4 oz)   09/11/17 61.5 kg (135 lb 9.6 oz)              Today, you had the following     No orders found for display       Primary Care Provider Office Phone # Fax #    Comfort Sabillon [phone] [facility] [address]        Equal Access to Services     CATINA HEAD : Hadii laurita mcguire prachi Sooliviaali, waaxda luqadaha, qaybta kadelbert franckchelaalana, waxalexandro dee haynegin nielsenmerryjose alejandro inman. So [facility] [phone].    ATENCIÓN: Si habla español, tiene a sarmiento disposición servicios gratuitos de asistencia lingüística. Llame al [phone].    We comply with applicable federal civil rights laws and [place] laws. We do not discriminate on the basis of race, color, national origin, age, disability sex, sexual orientation or gender identity.            Thank you!     Thank you for choosing [facility] RHEUMATOLOGY  for your care. Our goal is always to provide you with excellent care. Hearing back from our patients is one way we can continue to improve our services. Please take a few minutes to complete the written survey that you may receive in the mail after your visit with us. Thank you!             Your Updated Medication List - Protect others around you: Learn how to safely use, store and throw away your medicines at www.disposemymeds.org.          This list is accurate as of: 9/15/17 12:11 PM.  Always use your most recent med list.                   Brand Name Dispense Instructions for use Diagnosis    acetaminophen 325 MG tablet    TYLENOL     Take 2 tablets (650 mg) by mouth every 4 hours as needed for mild pain, fever or headaches    Diffuse large B-cell lymphoma of extranodal site (H)       acyclovir 400 MG tablet    ZOVIRAX    60 tablet    Take 1 tablet (400 mg) by mouth 2 times daily    Diffuse large B-cell lymphoma, unspecified body region (H)       ascorbic acid 500 MG Tabs     30 tablet    Take 1 tablet (500 mg) by mouth daily    Diffuse large B-cell lymphoma, unspecified body region (H)       atenolol 25 MG tablet    TENORMIN    60 tablet    Take 1 tablet (25 mg) by mouth 2 times daily    Atrial tachycardia (H)       calcium polycarbophil 625 MG tablet    FIBERCON     Take 1 tablet by mouth 2 times daily         calcium-vitamin D 600-400 MG-UNIT per tablet    CALTRATE    60 tablet    Take 2 tablets by mouth every morning    Diffuse large B-cell lymphoma, unspecified body region (H)       digoxin 125 MCG tablet    LANOXIN    30 tablet    Take 1 tablet (125 mcg) by mouth daily    Atrial tachycardia (H)       enoxaparin 60 MG/0.6ML injection    LOVENOX    60 Syringe    Inject 0.6 mLs (60 mg) Subcutaneous every 12 hours for 30 days    Acute deep vein thrombosis (DVT) of right upper extremity, unspecified vein (H)       fluconazole 100 MG tablet    DIFLUCAN    30 tablet    Take 1 tablet (100 mg) by mouth daily    Diffuse large B-cell lymphoma, unspecified body region (H)       furosemide 20 MG tablet    LASIX    5 tablet    Take 1 tablet (20 mg) by mouth daily for 5 days    Other hypervolemia       gabapentin 100 MG capsule    NEURONTIN    120 capsule    Take 2 capsules (200 mg) by mouth 3 times daily    Critical illness myopathy       leucovorin 15 MG Tabs     12 tablet    Take 1 tablet (15 mg) by mouth every 6 hours for 3 days    Diffuse large B-cell lymphoma of extranodal site (H)       multivitamin, therapeutic with minerals Tabs tablet     30 each    Take 1 tablet by mouth daily    Diffuse large B-cell lymphoma, unspecified body region (H)       ondansetron 8 MG ODT tab    ZOFRAN-ODT    20 tablet    Take 1 tablet (8 mg) by mouth every 8 hours as needed    Diffuse large B-cell lymphoma of extranodal site (H)       potassium & sodium phosphates 280-160-250 MG Packet    NEUTRA-PHOS    60 packet    Take 1 packet by mouth 2 times daily    Hypophosphatemia       potassium chloride SA 20 MEQ CR tablet    K-DUR/KLOR-CON M    60 tablet    Take 2 tablets (40 mEq) by mouth daily while taking furosemide (Lasix). You can decrease potassium to 20 mEq daily once off Lasix.    Diffuse large B-cell lymphoma, unspecified body region (H)       predniSONE 5 MG tablet    DELTASONE    30 tablet    Take 1 tablet (5 mg) by mouth daily     Diffuse large B-cell lymphoma, unspecified body region (H)       traMADol 50 MG tablet    ULTRAM    30 tablet    Take 1 tablet (50 mg) by mouth every 6 hours as needed for moderate pain    Diffuse large B-cell lymphoma of extranodal site (H)

## 2017-09-15 NOTE — NURSING NOTE
"Chief Complaint   Patient presents with     New Patient     Joint pain, HFU       Initial /69  Pulse 64  Temp 98  F (36.7  C) (Oral)  Ht 1.473 m (4' 10\")  Wt 54.4 kg (120 lb)  BMI 25.08 kg/m2 Estimated body mass index is 25.08 kg/(m^2) as calculated from the following:    Height as of this encounter: 1.473 m (4' 10\").    Weight as of this encounter: 54.4 kg (120 lb).  Medication Reconciliation: complete  "

## 2017-09-18 ENCOUNTER — PRE VISIT (OUTPATIENT)
Dept: UROLOGY | Facility: CLINIC | Age: 57
End: 2017-09-18

## 2017-09-20 ENCOUNTER — APPOINTMENT (OUTPATIENT)
Dept: LAB | Facility: CLINIC | Age: 57
End: 2017-09-20
Attending: INTERNAL MEDICINE
Payer: COMMERCIAL

## 2017-09-20 ENCOUNTER — INFUSION THERAPY VISIT (OUTPATIENT)
Dept: ONCOLOGY | Facility: CLINIC | Age: 57
End: 2017-09-20
Attending: INTERNAL MEDICINE
Payer: COMMERCIAL

## 2017-09-20 VITALS
RESPIRATION RATE: 16 BRPM | HEART RATE: 61 BPM | WEIGHT: 119.6 LBS | OXYGEN SATURATION: 100 % | SYSTOLIC BLOOD PRESSURE: 113 MMHG | BODY MASS INDEX: 25 KG/M2 | DIASTOLIC BLOOD PRESSURE: 65 MMHG | TEMPERATURE: 98.1 F

## 2017-09-20 DIAGNOSIS — C83.398 DIFFUSE LARGE B-CELL LYMPHOMA OF EXTRANODAL SITE: Primary | ICD-10-CM

## 2017-09-20 LAB
ALBUMIN SERPL-MCNC: 3.5 G/DL (ref 3.4–5)
ALP SERPL-CCNC: 100 U/L (ref 40–150)
ALT SERPL W P-5'-P-CCNC: 70 U/L (ref 0–50)
ANION GAP SERPL CALCULATED.3IONS-SCNC: 6 MMOL/L (ref 3–14)
ANISOCYTOSIS BLD QL SMEAR: ABNORMAL
AST SERPL W P-5'-P-CCNC: 44 U/L (ref 0–45)
BASOPHILS # BLD AUTO: 0.1 10E9/L (ref 0–0.2)
BASOPHILS NFR BLD AUTO: 1.8 %
BILIRUB SERPL-MCNC: 0.5 MG/DL (ref 0.2–1.3)
BUN SERPL-MCNC: 17 MG/DL (ref 7–30)
CALCIUM SERPL-MCNC: 9.3 MG/DL (ref 8.5–10.1)
CHLORIDE SERPL-SCNC: 107 MMOL/L (ref 94–109)
CO2 SERPL-SCNC: 27 MMOL/L (ref 20–32)
CREAT SERPL-MCNC: 0.6 MG/DL (ref 0.52–1.04)
DACRYOCYTES BLD QL SMEAR: SLIGHT
DIFFERENTIAL METHOD BLD: ABNORMAL
EOSINOPHIL # BLD AUTO: 0 10E9/L (ref 0–0.7)
EOSINOPHIL NFR BLD AUTO: 0 %
ERYTHROCYTE [DISTWIDTH] IN BLOOD BY AUTOMATED COUNT: ABNORMAL % (ref 10–15)
GFR SERPL CREATININE-BSD FRML MDRD: >90 ML/MIN/1.7M2
GLUCOSE SERPL-MCNC: 112 MG/DL (ref 70–99)
HCT VFR BLD AUTO: 29.7 % (ref 35–47)
HGB BLD-MCNC: 9.8 G/DL (ref 11.7–15.7)
LMWH PPP CHRO-ACNC: 1 IU/ML
LYMPHOCYTES # BLD AUTO: 0.4 10E9/L (ref 0.8–5.3)
LYMPHOCYTES NFR BLD AUTO: 14.6 %
MACROCYTES BLD QL SMEAR: PRESENT
MCH RBC QN AUTO: 37.1 PG (ref 26.5–33)
MCHC RBC AUTO-ENTMCNC: 33 G/DL (ref 31.5–36.5)
MCV RBC AUTO: 113 FL (ref 78–100)
MICROCYTES BLD QL SMEAR: PRESENT
MONOCYTES # BLD AUTO: 0.3 10E9/L (ref 0–1.3)
MONOCYTES NFR BLD AUTO: 10 %
NEUTROPHILS # BLD AUTO: 2.1 10E9/L (ref 1.6–8.3)
NEUTROPHILS NFR BLD AUTO: 73.6 %
NRBC # BLD AUTO: 0.1 10*3/UL
NRBC BLD AUTO-RTO: 2 /100
OVALOCYTES BLD QL SMEAR: SLIGHT
PLATELET # BLD AUTO: 77 10E9/L (ref 150–450)
POIKILOCYTOSIS BLD QL SMEAR: ABNORMAL
POLYCHROMASIA BLD QL SMEAR: SLIGHT
POTASSIUM SERPL-SCNC: 4 MMOL/L (ref 3.4–5.3)
PROT SERPL-MCNC: 6.7 G/DL (ref 6.8–8.8)
RBC # BLD AUTO: 2.64 10E12/L (ref 3.8–5.2)
SODIUM SERPL-SCNC: 140 MMOL/L (ref 133–144)
WBC # BLD AUTO: 2.9 10E9/L (ref 4–11)

## 2017-09-20 PROCEDURE — 85025 COMPLETE CBC W/AUTO DIFF WBC: CPT | Performed by: INTERNAL MEDICINE

## 2017-09-20 PROCEDURE — 96417 CHEMO IV INFUS EACH ADDL SEQ: CPT

## 2017-09-20 PROCEDURE — 25000132 ZZH RX MED GY IP 250 OP 250 PS 637: Mod: ZF | Performed by: INTERNAL MEDICINE

## 2017-09-20 PROCEDURE — 96377 APPLICATON ON-BODY INJECTOR: CPT | Mod: 59

## 2017-09-20 PROCEDURE — 96411 CHEMO IV PUSH ADDL DRUG: CPT

## 2017-09-20 PROCEDURE — 85520 HEPARIN ASSAY: CPT | Performed by: INTERNAL MEDICINE

## 2017-09-20 PROCEDURE — 96413 CHEMO IV INFUSION 1 HR: CPT

## 2017-09-20 PROCEDURE — 25000128 H RX IP 250 OP 636: Mod: ZF | Performed by: INTERNAL MEDICINE

## 2017-09-20 PROCEDURE — 96375 TX/PRO/DX INJ NEW DRUG ADDON: CPT

## 2017-09-20 PROCEDURE — 96415 CHEMO IV INFUSION ADDL HR: CPT

## 2017-09-20 PROCEDURE — 36415 COLL VENOUS BLD VENIPUNCTURE: CPT

## 2017-09-20 PROCEDURE — 96367 TX/PROPH/DG ADDL SEQ IV INF: CPT

## 2017-09-20 PROCEDURE — 80053 COMPREHEN METABOLIC PANEL: CPT | Performed by: INTERNAL MEDICINE

## 2017-09-20 RX ORDER — ACETAMINOPHEN 325 MG/1
650 TABLET ORAL ONCE
Status: COMPLETED | OUTPATIENT
Start: 2017-09-20 | End: 2017-09-20

## 2017-09-20 RX ORDER — PALONOSETRON 0.05 MG/ML
0.25 INJECTION, SOLUTION INTRAVENOUS ONCE
Status: COMPLETED | OUTPATIENT
Start: 2017-09-20 | End: 2017-09-20

## 2017-09-20 RX ORDER — DIPHENHYDRAMINE HCL 25 MG
50 CAPSULE ORAL ONCE
Status: COMPLETED | OUTPATIENT
Start: 2017-09-20 | End: 2017-09-20

## 2017-09-20 RX ORDER — PREDNISONE 50 MG/1
50 TABLET ORAL 2 TIMES DAILY
Qty: 10 TABLET | Refills: 0 | Status: SHIPPED | OUTPATIENT
Start: 2017-09-20 | End: 2017-09-25

## 2017-09-20 RX ORDER — EPINEPHRINE 0.3 MG/.3ML
INJECTION SUBCUTANEOUS
Status: DISCONTINUED
Start: 2017-09-20 | End: 2017-09-20 | Stop reason: WASHOUT

## 2017-09-20 RX ADMIN — DIPHENHYDRAMINE HYDROCHLORIDE 50 MG: 25 CAPSULE ORAL at 11:03

## 2017-09-20 RX ADMIN — RITUXIMAB 560 MG: 10 INJECTION, SOLUTION INTRAVENOUS at 12:03

## 2017-09-20 RX ADMIN — SODIUM CHLORIDE 150 MG: 9 INJECTION, SOLUTION INTRAVENOUS at 10:40

## 2017-09-20 RX ADMIN — VINCRISTINE SULFATE 2 MG: 1 INJECTION, SOLUTION INTRAVENOUS at 11:23

## 2017-09-20 RX ADMIN — PALONOSETRON HYDROCHLORIDE 0.25 MG: 0.25 INJECTION INTRAVENOUS at 10:37

## 2017-09-20 RX ADMIN — DOXORUBICIN HYDROCHLORIDE 75 MG: 2 INJECTION, SOLUTION INTRAVENOUS at 11:13

## 2017-09-20 RX ADMIN — SODIUM CHLORIDE 250 ML: 9 INJECTION, SOLUTION INTRAVENOUS at 10:37

## 2017-09-20 RX ADMIN — ACETAMINOPHEN 650 MG: 325 TABLET ORAL at 11:03

## 2017-09-20 RX ADMIN — PEGFILGRASTIM 6 MG: KIT SUBCUTANEOUS at 14:14

## 2017-09-20 RX ADMIN — CYCLOPHOSPHAMIDE 1125 MG: 1 INJECTION, POWDER, FOR SOLUTION INTRAVENOUS; ORAL at 11:29

## 2017-09-20 ASSESSMENT — PAIN SCALES - GENERAL: PAINLEVEL: NO PAIN (0)

## 2017-09-20 NOTE — NURSING NOTE
Chief Complaint   Patient presents with     Labs Only     venipuncture, vitals checked     PIV placed for infusion

## 2017-09-20 NOTE — PATIENT INSTRUCTIONS
Contact Numbers    [facility] Main Line: [phone]  [facility] Triage:  [phone]    Call triage with chills and/or temperature greater than or equal to 100.5, uncontrolled nausea/vomiting, diarrhea, constipation, dizziness, shortness of breath, chest pain, bleeding, unexplained bruising, or any new/concerning symptoms, questions/concerns.     If you are having any concerning symptoms or wish to speak to a provider before your next infusion visit, please call your care coordinator or triage to notify them so we can adequately serve you.       After Hours: [phone]    If after hours, weekends, or holidays, call main hospital  and ask for Oncology doctor on call.           September 2017 Sunday Monday Tuesday Wednesday Thursday Friday Saturday                            1     2     P MASONIC LAB DRAW    8:00 AM   (15 min.)    MASONIC LAB DRAW   [facility] Masonic Lab Draw     UMP ONC INFUSION 120    8:30 AM   (120 min.)    ONCOLOGY INFUSION   [facility]   3     4     UMP [facility] LAB DRAW    9:00 AM   (15 min.)    MASONIC LAB DRAW   [facility] Masonic Lab Draw     UMP ONC INFUSION 120    9:30 AM   (120 min.)    ONCOLOGY INFUSION   [facility] 5     6     P [facility] LAB DRAW    7:15 AM   (15 min.)    MASONIC LAB DRAW   [facility] Lab Draw     UMP RETURN    7:45 AM   (50 min.)   Kalpana Alvarado PA-C   [facility] 7     Admission    9:01 AM   Nany Chadwick MD   [address] [facility]   (Discharge: 9/11/2017)     IR PICC VASCULAR    2:15 PM   (60 min.)   UUVAS1   [facility], Vascular Access 8     9       10     CT CHEST PULMONARY EMBOLISM W   11:20 AM   (30 min.)   [facility]     US UPR EXT ALYSIA DUP RIGHT PORT    3:15 PM   (40 min.)   UUUS1   [facility] 11     ECH COMPLETE    9:35 AM   (60 min.)   UUECHIPR1   [facility],  Echocardiography 12     13     UMP [facility] LAB DRAW   11:15 AM   (15 min.)     [facility]   [facility]     P ONC RETURN   12:00 PM   (30 min.)   Lenore Rodriguez MD   [facility] Blood and Marrow Transplant 14     15     UMP NEW    9:45 AM   (60 min.)   Pj Cunha MD   [facility] Rheumatology 16       17     18     19     20     UMP [facility] LAB DRAW    8:30 AM   (15 min.)    [facility] DRAW   [facility]     UMP ONC INFUSION 360    9:00 AM   (360 min.)    ONCOLOGY INFUSION   [facility] 21     22     23       24     25     26     27     [facility] MASONIC LAB DRAW   10:15 AM   (15 min.)    MASONIC LAB DRAW   [facility] Lab Draw     [facility] ONC INFUSION 120   11:00 AM   (120 min.)    ONCOLOGY INFUSION   [facility]     UM IMPLANTED DEFIBRILLATOR   12:45 PM   (30 min.)   1,  Cv Device   [facility]     UM RETURN ARRHYTHMIA    1:15 PM   (30 min.)   Juan Eaton MD   [facility] 28 29 30 October 2017 Sunday Monday Tuesday Wednesday Thursday Friday Saturday   1     2     UMP NEW    9:15 AM   (30 min.)   Archana Green PA-C   [facility] Urology and Inst for Prostate and Urologic Cancers 3     4     5     6     7       8     9     PET ONCOLOGY WHOLE BODY    7:30 AM   (45 min.)   UUPET1   [facility] [place] PET CT 10     11     [facility] MASONIC LAB DRAW    6:30 AM   (15 min.)    [facility] LAB DRAW   [facility]     P ONC INFUSION 360    7:00 AM   (360 min.)    ONCOLOGY INFUSION   [facility]     UMP ONC RETURN   11:30 AM   (30 min.)   Lenore Rodriguez MD   [facility] Blood and Marrow Transplant 12     13     14       15     16     17     18     19     20     21       22     23     24     25     26     27     28       29     30     31                                     Recent Results (from the past 24 hour(s))   CBC with platelets differential    Collection Time: 09/20/17  9:32 AM   Result Value Ref Range    WBC 2.9 (L) 4.0 - 11.0 10e9/L     RBC Count 2.64 (L) 3.8 - 5.2 10e12/L    Hemoglobin 9.8 (L) 11.7 - 15.7 g/dL    Hematocrit 29.7 (L) 35.0 - 47.0 %     (H) 78 - 100 fl    MCH 37.1 (H) 26.5 - 33.0 pg    MCHC 33.0 31.5 - 36.5 g/dL    RDW Dimorphic population - unable to calculate 10.0 - 15.0 %    Platelet Count 77 (L) 150 - 450 10e9/L    Diff Method Manual Differential     % Neutrophils 73.6 %    % Lymphocytes 14.6 %    % Monocytes 10.0 %    % Eosinophils 0.0 %    % Basophils 1.8 %    Nucleated RBCs 2 (H) 0 /100    Absolute Neutrophil 2.1 1.6 - 8.3 10e9/L    Absolute Lymphocytes 0.4 (L) 0.8 - 5.3 10e9/L    Absolute Monocytes 0.3 0.0 - 1.3 10e9/L    Absolute Eosinophils 0.0 0.0 - 0.7 10e9/L    Absolute Basophils 0.1 0.0 - 0.2 10e9/L    Absolute Nucleated RBC 0.1     Anisocytosis Moderate     Poikilocytosis Moderate     Polychromasia Slight     Teardrop Cells Slight     Ovalocytes Slight     Microcytes Present     Macrocytes Present    Comprehensive metabolic panel    Collection Time: 09/20/17  9:32 AM   Result Value Ref Range    Sodium 140 133 - 144 mmol/L    Potassium 4.0 3.4 - 5.3 mmol/L    Chloride 107 94 - 109 mmol/L    Carbon Dioxide 27 20 - 32 mmol/L    Anion Gap 6 3 - 14 mmol/L    Glucose 112 (H) 70 - 99 mg/dL    Urea Nitrogen 17 7 - 30 mg/dL    Creatinine 0.60 0.52 - 1.04 mg/dL    GFR Estimate >90 >60 mL/min/1.7m2    GFR Estimate If Black >90 >60 mL/min/1.7m2    Calcium 9.3 8.5 - 10.1 mg/dL    Bilirubin Total 0.5 0.2 - 1.3 mg/dL    Albumin 3.5 3.4 - 5.0 g/dL    Protein Total 6.7 (L) 6.8 - 8.8 g/dL    Alkaline Phosphatase 100 40 - 150 U/L    ALT 70 (H) 0 - 50 U/L    AST 44 0 - 45 U/L   Anti XA Level    Collection Time: 09/20/17  9:32 AM   Result Value Ref Range    Heparin 10A Level 1.00 IU/mL

## 2017-09-20 NOTE — MR AVS SNAPSHOT
After Visit Summary   9/20/2017    Amelia Michel    MRN: [id number]           Patient Information     Date Of Birth          1960        Visit Information        Provider Department      9/20/2017 9:00 AM UC 26 ATC; UC ONCOLOGY INFUSION [facility]        Today's Diagnoses     Diffuse large B-cell lymphoma of extranodal site (H)    -  1      Care Instructions    Contact Numbers    [facility] Main Line: [phone]  [facility] Triage:  [phone]    Call triage with chills and/or temperature greater than or equal to 100.5, uncontrolled nausea/vomiting, diarrhea, constipation, dizziness, shortness of breath, chest pain, bleeding, unexplained bruising, or any new/concerning symptoms, questions/concerns.     If you are having any concerning symptoms or wish to speak to a provider before your next infusion visit, please call your care coordinator or triage to notify them so we can adequately serve you.       After Hours: [phone]    If after hours, weekends, or holidays, call main hospital  and ask for Oncology doctor on call.           September 2017 Sunday Monday Tuesday Wednesday Thursday Friday Saturday                            1     2     P [facility] LAB DRAW    8:00 AM   (15 min.)    MASONIC LAB DRAW   [facility] Masonic Lab Draw     UMP ONC INFUSION 120    8:30 AM   (120 min.)    ONCOLOGY INFUSION   [facility]   3     4     UMP MASONIC LAB DRAW    9:00 AM   (15 min.)    MASONIC LAB DRAW   [facility] Masonic Lab Draw     UMP ONC INFUSION 120    9:30 AM   (120 min.)    ONCOLOGY INFUSION   [facility] 5     6     UMP MASONIC LAB DRAW    7:15 AM   (15 min.)    MASONIC LAB DRAW   [facility] Masonic Lab Draw     UMP RETURN    7:45 AM   (50 min.)   Kalpana Alvarado PA-C   [facility] 7     Admission    9:01 AM   Nany Chadwick MD   Unit 7D [facility]   (Discharge: 9/11/2017)     IR PICC VASCULAR    2:15 PM    (60 min.)   UUVAS1   [facility], Vascular Access 8     9       10     CT CHEST PULMONARY EMBOLISM W   11:20 AM   (30 min.)   UUCT1   [facility], CT     US UPR EXT ALYSIA DUP RIGHT PORT    3:15 PM   (40 min.)   UUUS1   [facility], Ultrasound 11     ECH COMPLETE    9:35 AM   (60 min.)   UUECHIPR1   [facility],  Echocardiography 12     13     UMP [facility] LAB DRAW   11:15 AM   (15 min.)    [facility]     P ONC RETURN   12:00 PM   (30 min.)   Lenore Rodriguez MD   [facility] Blood and Marrow Transplant 14     15     UMP NEW    9:45 AM   (60 min.)   Pj Cunha MD   [facility] Rheumatology 16       17     18     19     20     P [facility] LAB DRAW    8:30 AM   (15 min.)    [facility] DRAW   [facility]     [facility] ONC INFUSION 360    9:00 AM   (360 min.)    ONCOLOGY INFUSION   [facility] 21     22     23       24     25     26     27     [facility] MASONIC LAB DRAW   10:15 AM   (15 min.)    [facility] LAB DRAW   [facility]     P ONC INFUSION 120   11:00 AM   (120 min.)    ONCOLOGY INFUSION   [facility] IMPLANTED DEFIBRILLATOR   12:45 PM   (30 min.)   1,  Cv Device   [facility] RETURN ARRHYTHMIA    1:15 PM   (30 min.)   Juan Eaton MD   [facility] 28     29     30 October 2017 Sunday Monday Tuesday Wednesday Thursday Friday Saturday   1     2     UMP NEW    9:15 AM   (30 min.)   Archana Green PA-C   [facility] Urology and Inst for Prostate and Urologic Cancers 3     4     5     6     7       8     9     PET ONCOLOGY WHOLE BODY    7:30 AM   (45 min.)   UUPET1   [facility] PET CT 10     11     P [facility] LAB DRAW    6:30 AM   (15 min.)    [facility] LAB DRAW   [facility]     UMP ONC INFUSION 360    7:00 AM   (360 min.)    ONCOLOGY INFUSION   [facility]     UMP ONC RETURN   11:30 AM   (30 min.)   Jennifer  Lenore Worley MD   [facility] Blood and Marrow Transplant 12     13     14       15     16     17     18     19     20     21       22     23     24     25     26     27     28       29     30     31                                     Recent Results (from the past 24 hour(s))   CBC with platelets differential    Collection Time: 09/20/17  9:32 AM   Result Value Ref Range    WBC 2.9 (L) 4.0 - 11.0 10e9/L    RBC Count 2.64 (L) 3.8 - 5.2 10e12/L    Hemoglobin 9.8 (L) 11.7 - 15.7 g/dL    Hematocrit 29.7 (L) 35.0 - 47.0 %     (H) 78 - 100 fl    MCH 37.1 (H) 26.5 - 33.0 pg    MCHC 33.0 31.5 - 36.5 g/dL    RDW Dimorphic population - unable to calculate 10.0 - 15.0 %    Platelet Count 77 (L) 150 - 450 10e9/L    Diff Method Manual Differential     % Neutrophils 73.6 %    % Lymphocytes 14.6 %    % Monocytes 10.0 %    % Eosinophils 0.0 %    % Basophils 1.8 %    Nucleated RBCs 2 (H) 0 /100    Absolute Neutrophil 2.1 1.6 - 8.3 10e9/L    Absolute Lymphocytes 0.4 (L) 0.8 - 5.3 10e9/L    Absolute Monocytes 0.3 0.0 - 1.3 10e9/L    Absolute Eosinophils 0.0 0.0 - 0.7 10e9/L    Absolute Basophils 0.1 0.0 - 0.2 10e9/L    Absolute Nucleated RBC 0.1     Anisocytosis Moderate     Poikilocytosis Moderate     Polychromasia Slight     Teardrop Cells Slight     Ovalocytes Slight     Microcytes Present     Macrocytes Present    Comprehensive metabolic panel    Collection Time: 09/20/17  9:32 AM   Result Value Ref Range    Sodium 140 133 - 144 mmol/L    Potassium 4.0 3.4 - 5.3 mmol/L    Chloride 107 94 - 109 mmol/L    Carbon Dioxide 27 20 - 32 mmol/L    Anion Gap 6 3 - 14 mmol/L    Glucose 112 (H) 70 - 99 mg/dL    Urea Nitrogen 17 7 - 30 mg/dL    Creatinine 0.60 0.52 - 1.04 mg/dL    GFR Estimate >90 >60 mL/min/1.7m2    GFR Estimate If Black >90 >60 mL/min/1.7m2    Calcium 9.3 8.5 - 10.1 mg/dL    Bilirubin Total 0.5 0.2 - 1.3 mg/dL    Albumin 3.5 3.4 - 5.0 g/dL    Protein Total 6.7 (L) 6.8 - 8.8 g/dL    Alkaline Phosphatase 100 40 - 150 U/L     ALT 70 (H) 0 - 50 U/L    AST 44 0 - 45 U/L   Anti XA Level    Collection Time: 09/20/17  9:32 AM   Result Value Ref Range    Heparin 10A Level 1.00 IU/mL               Follow-ups after your visit        Your next 10 appointments already scheduled     Sep 27, 2017 10:15 AM CDT   [facility] Lab Draw with UC MASONIC LAB DRAW   [facility] Lab Draw ([facility])    [address] [place] [address]  2nd Floor  [facility] [address]   [phone]            Sep 27, 2017 11:00 AM CDT   Infusion 120 with UC ONCOLOGY INFUSION, UC 32 ATC   [facility] ([facility])    [address] [place] [address]  2nd Floor  [facility] [address]   [phone]            Sep 27, 2017  1:00 PM CDT   (Arrive by 12:45 PM)   Implanted Defibulator with Uc Cv Device 1   [facility] ([facility])    [address] [place] [address]  3rd [facility] [address] [phone]            Sep 27, 2017  1:30 PM CDT   (Arrive by 1:15 PM)   RETURN ARRHYTHMIA with Juan Eaton MD   [facility] ([facility])    [address] [place] [address]  3rd [facility] [address] [phone]            Oct 02, 2017  9:30 AM CDT   (Arrive by 9:15 AM)   New Patient Visit with Archana Green PA-C   [facility] Urology and Inst for Prostate and Urologic Cancers ([facility])    [address] [place] [address]  4th Floor  [facility] [address] [phone]            Oct 09, 2017  7:30 AM CDT   PET ONCOLOGY WHOLE BODY with UUPET1   [facility] [place] PET CT ([facility])    [address] [facility] [address] [phone]           Tell your doctor:   If there is any chance you may be pregnant or if you are breastfeeding.   If you have problems lying in small spaces (claustrophobia). If you do, your doctor may give you medicine to help you relax. If you have diabetes:   Have  your exam early in the morning. Your blood glucose will go up as the day goes by.   Your glucose level must be 180 or less at the start of the exam. Please take any medicines you need to ensure this blood glucose level. 24 hours before your scan: Don t do any heavy exercise. (No jogging, aerobics or other workouts.) Exercise will make your pictures less accurate. 6 hours before your scan:   Stop all food and liquids (except water).   Do not chew gum or suck on mints.   If you need to take medicine with food, you may take it with a few crackers.  Please call your Imaging Department at your exam site with any questions.            Oct 11, 2017  6:30 AM CDT   Masonic Lab Draw with UC MASONIC LAB DRAW   [facility] Lab Draw ([facility])    [address] [place] [address] [phone]            Oct 11, 2017  7:00 AM CDT   Infusion 360 with  ONCOLOGY INFUSION, UC 12 ATC   [facility] ([facility])    [address] [place] [address] [phone]              Who to contact     If you have questions or need follow up information about today's clinic visit or your schedule please contact [facility] directly at [phone].  Normal or non-critical lab and imaging results will be communicated to you by MyChart, letter or phone within 4 business days after the clinic has received the results. If you do not hear from us within 7 days, please contact the clinic through MyChart or phone. If you have a critical or abnormal lab result, we will notify you by phone as soon as possible.  Submit refill requests through [employer] or call your pharmacy and they will forward the refill request to us. Please allow 3 business days for your refill to be completed.          Additional Information About Your Visit        [employer] Information     [employer] gives you secure access to your electronic  health record. If you see a primary care provider, you can also send messages to your care team and make appointments. If you have questions, please call your primary care clinic.  If you do not have a primary care provider, please call [phone] and they will assist you.        Care EveryWhere ID     This is your Care EveryWhere ID. This could be used by other organizations to access your [place] medical records  [id number]        Your Vitals Were     Pulse Temperature Respirations Pulse Oximetry BMI (Body Mass Index)       61 98.1  F (36.7  C) 16 100% 25 kg/m2        Blood Pressure from Last 3 Encounters:   09/20/17 113/65   09/15/17 137/69   09/13/17 131/81    Weight from Last 3 Encounters:   09/20/17 54.3 kg (119 lb 9.6 oz)   09/15/17 54.4 kg (120 lb)   09/13/17 56.7 kg (124 lb 14.4 oz)              We Performed the Following     Anti XA Level     CBC with platelets differential     Comprehensive metabolic panel          Today's Medication Changes          These changes are accurate as of: 9/20/17 11:35 AM.  If you have any questions, ask your nurse or doctor.               These medicines have changed or have updated prescriptions.        Dose/Directions    * predniSONE 5 MG tablet   Commonly known as:  DELTASONE   This may have changed:  Another medication with the same name was added. Make sure you understand how and when to take each.   Used for:  Diffuse large B-cell lymphoma, unspecified body region (H)   Changed by:  Lenore Rodriguez MD        Dose:  5 mg   Take 1 tablet (5 mg) by mouth daily   Quantity:  30 tablet   Refills:  0       * predniSONE 50 MG tablet   Commonly known as:  DELTASONE   This may have changed:  You were already taking a medication with the same name, and this prescription was added. Make sure you understand how and when to take each.   Used for:  Diffuse large B-cell lymphoma of extranodal site (H)        Dose:  50 mg   Take 1 tablet (50 mg) by mouth 2 times daily for 5  days Take on Days 1 through 5. Take first dose in AM prior to chemotherapy.   Quantity:  10 tablet   Refills:  0       * Notice:  This list has 2 medication(s) that are the same as other medications prescribed for you. Read the directions carefully, and ask your doctor or other care provider to review them with you.         Where to get your medicines      These medications were sent to [place], [place] - [address]  [address], [facility] [address]    Hours:  TRANSPLANT PHONE NUMBER [phone] Phone:  [phone]     predniSONE 50 MG tablet                Primary Care Provider Office Phone # Fax #    Comfort Sabillon [phone] [address] [facility] [facility] [address]        Equal Access to Services     CATINA HEAD AH: Hadii laurita mcguire hadasho Soomaali, waaxda luqadaha, qaybta kaalmada adeegyada, durga inman. So [facility] [phone].    ATENCIÓN: Si habla español, tiene a sarmiento disposición servicios gratuitos de asistencia lingüística. Llame al [phone].    We comply with applicable federal civil rights laws and [place] laws. We do not discriminate on the basis of race, color, national origin, age, disability sex, sexual orientation or gender identity.            Thank you!     Thank you for choosing [facility]CER [facility]  for your care. Our goal is always to provide you with excellent care. Hearing back from our patients is one way we can continue to improve our services. Please take a few minutes to complete the written survey that you may receive in the mail after your visit with us. Thank you!             Your Updated Medication List - Protect others around you: Learn how to safely use, store and throw away your medicines at www.disposemymeds.org.          This list is accurate as of: 9/20/17 11:35 AM.  Always use your most recent med list.                   Brand Name Dispense  Instructions for use Diagnosis    acetaminophen 325 MG tablet    TYLENOL     Take 2 tablets (650 mg) by mouth every 4 hours as needed for mild pain, fever or headaches    Diffuse large B-cell lymphoma of extranodal site (H)       acyclovir 400 MG tablet    ZOVIRAX    60 tablet    Take 1 tablet (400 mg) by mouth 2 times daily    Diffuse large B-cell lymphoma, unspecified body region (H)       ascorbic acid 500 MG Tabs     30 tablet    Take 1 tablet (500 mg) by mouth daily    Diffuse large B-cell lymphoma, unspecified body region (H)       atenolol 25 MG tablet    TENORMIN    60 tablet    Take 1 tablet (25 mg) by mouth 2 times daily    Atrial tachycardia (H)       calcium polycarbophil 625 MG tablet    FIBERCON     Take 1 tablet by mouth 2 times daily        calcium-vitamin D 600-400 MG-UNIT per tablet    CALTRATE    60 tablet    Take 2 tablets by mouth every morning    Diffuse large B-cell lymphoma, unspecified body region (H)       digoxin 125 MCG tablet    LANOXIN    30 tablet    Take 1 tablet (125 mcg) by mouth daily    Atrial tachycardia (H)       enoxaparin 60 MG/0.6ML injection    LOVENOX    60 Syringe    Inject 0.6 mLs (60 mg) Subcutaneous every 12 hours for 30 days    Acute deep vein thrombosis (DVT) of right upper extremity, unspecified vein (H)       fluconazole 100 MG tablet    DIFLUCAN    30 tablet    Take 1 tablet (100 mg) by mouth daily    Diffuse large B-cell lymphoma, unspecified body region (H)       furosemide 20 MG tablet    LASIX    5 tablet    Take 1 tablet (20 mg) by mouth daily for 5 days    Other hypervolemia       gabapentin 100 MG capsule    NEURONTIN    120 capsule    Take 2 capsules (200 mg) by mouth 3 times daily    Critical illness myopathy       leucovorin 15 MG Tabs     12 tablet    Take 1 tablet (15 mg) by mouth every 6 hours for 3 days    Diffuse large B-cell lymphoma of extranodal site (H)       lidocaine visc 2% 2.5mL/5mL & maalox/mylanta w/ simeth 2.5mL/5mL & diphenhydrAMINE  5mg/5mL Susp suspension    [facility] Mouthwash [facility]     Swish and swallow 10 mLs in mouth 2 times daily        multivitamin, therapeutic with minerals Tabs tablet     30 each    Take 1 tablet by mouth daily    Diffuse large B-cell lymphoma, unspecified body region (H)       ondansetron 8 MG ODT tab    ZOFRAN-ODT    20 tablet    Take 1 tablet (8 mg) by mouth every 8 hours as needed    Diffuse large B-cell lymphoma of extranodal site (H)       potassium & sodium phosphates 280-160-250 MG Packet    NEUTRA-PHOS    60 packet    Take 1 packet by mouth 2 times daily    Hypophosphatemia       potassium chloride SA 20 MEQ CR tablet    K-DUR/KLOR-CON M    60 tablet    Take 2 tablets (40 mEq) by mouth daily while taking furosemide (Lasix). You can decrease potassium to 20 mEq daily once off Lasix.    Diffuse large B-cell lymphoma, unspecified body region (H)       * predniSONE 5 MG tablet    DELTASONE    30 tablet    Take 1 tablet (5 mg) by mouth daily    Diffuse large B-cell lymphoma, unspecified body region (H)       * predniSONE 50 MG tablet    DELTASONE    10 tablet    Take 1 tablet (50 mg) by mouth 2 times daily for 5 days Take on Days 1 through 5. Take first dose in AM prior to chemotherapy.    Diffuse large B-cell lymphoma of extranodal site (H)       traMADol 50 MG tablet    ULTRAM    30 tablet    Take 1 tablet (50 mg) by mouth every 6 hours as needed for moderate pain    Diffuse large B-cell lymphoma of extranodal site (H)       * Notice:  This list has 2 medication(s) that are the same as other medications prescribed for you. Read the directions carefully, and ask your doctor or other care provider to review them with you.

## 2017-09-26 ENCOUNTER — PRE VISIT (OUTPATIENT)
Dept: CARDIOLOGY | Facility: CLINIC | Age: 57
End: 2017-09-26

## 2017-09-26 DIAGNOSIS — I47.19 ATRIAL TACHYCARDIA (H): ICD-10-CM

## 2017-09-26 DIAGNOSIS — M25.372: Primary | ICD-10-CM

## 2017-09-26 DIAGNOSIS — Z86.74 H/O CARDIAC ARREST: Primary | ICD-10-CM

## 2017-09-26 DIAGNOSIS — M25.373: ICD-10-CM

## 2017-09-26 LAB
BLD PROD TYP BPU: NORMAL
BLD UNIT ID BPU: 0
BLOOD PRODUCT CODE: NORMAL
BPU ID: NORMAL
TRANSFUSION STATUS PATIENT QL: NORMAL
TRANSFUSION STATUS PATIENT QL: NORMAL

## 2017-09-27 ENCOUNTER — INFUSION THERAPY VISIT (OUTPATIENT)
Dept: ONCOLOGY | Facility: CLINIC | Age: 57
End: 2017-09-27
Attending: INTERNAL MEDICINE
Payer: COMMERCIAL

## 2017-09-27 ENCOUNTER — APPOINTMENT (OUTPATIENT)
Dept: LAB | Facility: CLINIC | Age: 57
End: 2017-09-27
Attending: INTERNAL MEDICINE
Payer: COMMERCIAL

## 2017-09-27 ENCOUNTER — OFFICE VISIT (OUTPATIENT)
Dept: CARDIOLOGY | Facility: CLINIC | Age: 57
End: 2017-09-27
Attending: INTERNAL MEDICINE
Payer: COMMERCIAL

## 2017-09-27 VITALS
SYSTOLIC BLOOD PRESSURE: 134 MMHG | BODY MASS INDEX: 25.71 KG/M2 | DIASTOLIC BLOOD PRESSURE: 66 MMHG | HEART RATE: 60 BPM | OXYGEN SATURATION: 100 % | TEMPERATURE: 98.6 F | WEIGHT: 123 LBS | RESPIRATION RATE: 18 BRPM

## 2017-09-27 VITALS
DIASTOLIC BLOOD PRESSURE: 69 MMHG | HEIGHT: 58 IN | SYSTOLIC BLOOD PRESSURE: 137 MMHG | HEART RATE: 64 BPM | BODY MASS INDEX: 25.19 KG/M2 | WEIGHT: 120 LBS

## 2017-09-27 DIAGNOSIS — I47.19 ATRIAL TACHYCARDIA (H): ICD-10-CM

## 2017-09-27 DIAGNOSIS — C83.398 DIFFUSE LARGE B-CELL LYMPHOMA OF EXTRANODAL SITE: Primary | ICD-10-CM

## 2017-09-27 LAB
ABO + RH BLD: NORMAL
ABO + RH BLD: NORMAL
BLD GP AB SCN SERPL QL: NORMAL
BLD PROD TYP BPU: NORMAL
BLD UNIT ID BPU: 0
BLD UNIT ID BPU: 0
BLOOD BANK CMNT PATIENT-IMP: NORMAL
BLOOD PRODUCT CODE: NORMAL
BLOOD PRODUCT CODE: NORMAL
BPU ID: NORMAL
BPU ID: NORMAL
DIFFERENTIAL METHOD BLD: ABNORMAL
ERYTHROCYTE [DISTWIDTH] IN BLOOD BY AUTOMATED COUNT: 22.9 % (ref 10–15)
HCT VFR BLD AUTO: 28.1 % (ref 35–47)
HGB BLD-MCNC: 9.4 G/DL (ref 11.7–15.7)
MCH RBC QN AUTO: 37.9 PG (ref 26.5–33)
MCHC RBC AUTO-ENTMCNC: 33.5 G/DL (ref 31.5–36.5)
MCV RBC AUTO: 113 FL (ref 78–100)
NUM BPU REQUESTED: 1
NUM BPU REQUESTED: 1
PLATELET # BLD AUTO: 24 10E9/L (ref 150–450)
PLATELET # BLD AUTO: 52 10E9/L (ref 150–450)
RBC # BLD AUTO: 2.48 10E12/L (ref 3.8–5.2)
SPECIMEN EXP DATE BLD: NORMAL
TRANSFUSION RXN BLOOD BANK NOTIFICATION: NORMAL
TRANSFUSION STATUS PATIENT QL: NORMAL
WBC # BLD AUTO: 0.4 10E9/L (ref 4–11)

## 2017-09-27 PROCEDURE — 85049 AUTOMATED PLATELET COUNT: CPT | Performed by: INTERNAL MEDICINE

## 2017-09-27 PROCEDURE — 96375 TX/PRO/DX INJ NEW DRUG ADDON: CPT

## 2017-09-27 PROCEDURE — 99214 OFFICE O/P EST MOD 30 MIN: CPT | Mod: ZP | Performed by: INTERNAL MEDICINE

## 2017-09-27 PROCEDURE — 85027 COMPLETE CBC AUTOMATED: CPT | Performed by: INTERNAL MEDICINE

## 2017-09-27 PROCEDURE — 40000341 ZZHCL STATISTIC BB TRANSF RXN INVEST

## 2017-09-27 PROCEDURE — 25000128 H RX IP 250 OP 636: Mod: ZF | Performed by: INTERNAL MEDICINE

## 2017-09-27 PROCEDURE — 99213 OFFICE O/P EST LOW 20 MIN: CPT | Mod: ZF

## 2017-09-27 PROCEDURE — P9037 PLATE PHERES LEUKOREDU IRRAD: HCPCS | Performed by: INTERNAL MEDICINE

## 2017-09-27 PROCEDURE — 86901 BLOOD TYPING SEROLOGIC RH(D): CPT | Performed by: INTERNAL MEDICINE

## 2017-09-27 PROCEDURE — 25000132 ZZH RX MED GY IP 250 OP 250 PS 637: Mod: ZF | Performed by: INTERNAL MEDICINE

## 2017-09-27 PROCEDURE — 0298T ZZC EXT ECG > 48HR TO 21 DAY REVIEW AND INTERPRETATN: CPT | Performed by: INTERNAL MEDICINE

## 2017-09-27 PROCEDURE — 40000257 ZZH STATISTIC CONSULT NO CHARGE VASC ACCESS: Mod: ZF

## 2017-09-27 PROCEDURE — 96374 THER/PROPH/DIAG INJ IV PUSH: CPT

## 2017-09-27 PROCEDURE — 36430 TRANSFUSION BLD/BLD COMPNT: CPT

## 2017-09-27 PROCEDURE — 93010 ELECTROCARDIOGRAM REPORT: CPT | Mod: ZP | Performed by: INTERNAL MEDICINE

## 2017-09-27 PROCEDURE — 86850 RBC ANTIBODY SCREEN: CPT | Performed by: INTERNAL MEDICINE

## 2017-09-27 PROCEDURE — 86923 COMPATIBILITY TEST ELECTRIC: CPT | Performed by: INTERNAL MEDICINE

## 2017-09-27 PROCEDURE — 99212 OFFICE O/P EST SF 10 MIN: CPT

## 2017-09-27 PROCEDURE — 86900 BLOOD TYPING SEROLOGIC ABO: CPT | Performed by: INTERNAL MEDICINE

## 2017-09-27 PROCEDURE — 93005 ELECTROCARDIOGRAM TRACING: CPT | Mod: ZF

## 2017-09-27 PROCEDURE — 0296T ZIO PATCH HOLTER: CPT | Mod: ZF | Performed by: INTERNAL MEDICINE

## 2017-09-27 RX ORDER — DIPHENHYDRAMINE HCL 25 MG
25 CAPSULE ORAL
Status: CANCELLED
Start: 2017-09-27

## 2017-09-27 RX ORDER — ACETAMINOPHEN 325 MG/1
325 TABLET ORAL ONCE
Status: CANCELLED
Start: 2017-09-27 | End: 2017-09-27

## 2017-09-27 RX ORDER — DIPHENHYDRAMINE HCL 25 MG
25 CAPSULE ORAL
Status: COMPLETED | OUTPATIENT
Start: 2017-09-27 | End: 2017-09-27

## 2017-09-27 RX ORDER — ACETAMINOPHEN 325 MG/1
325 TABLET ORAL ONCE
Status: COMPLETED | OUTPATIENT
Start: 2017-09-27 | End: 2017-09-27

## 2017-09-27 RX ORDER — DIPHENHYDRAMINE HYDROCHLORIDE 50 MG/ML
25 INJECTION INTRAMUSCULAR; INTRAVENOUS ONCE
Status: COMPLETED | OUTPATIENT
Start: 2017-09-27 | End: 2017-09-27

## 2017-09-27 RX ADMIN — DIPHENHYDRAMINE HYDROCHLORIDE 25 MG: 50 INJECTION INTRAMUSCULAR; INTRAVENOUS at 13:27

## 2017-09-27 RX ADMIN — HYDROCORTISONE SODIUM SUCCINATE 100 MG: 100 INJECTION, POWDER, FOR SOLUTION INTRAMUSCULAR; INTRAVENOUS at 13:28

## 2017-09-27 RX ADMIN — DIPHENHYDRAMINE HYDROCHLORIDE 25 MG: 25 CAPSULE ORAL at 11:56

## 2017-09-27 RX ADMIN — ACETAMINOPHEN 325 MG: 325 TABLET ORAL at 11:56

## 2017-09-27 ASSESSMENT — PAIN SCALES - GENERAL
PAINLEVEL: NO PAIN (0)
PAINLEVEL: NO PAIN (0)

## 2017-09-27 NOTE — PROGRESS NOTES
Infusion Nursing Note:  Amelia Michel presents today for platelet transfusion.    Patient seen by provider today: No   present during visit today: Not Applicable.    Note: TORB per Dr. Rodriguez on 9/27/2017 at 1135  - OK to transfuse 1 unit platelets with platelets of 24,000 since patient is taking lovenox.  -premedication for tylenol and PRN benadryl and IV hydrocortisone if reaction.  - HOLD lovenox  - Recheck platelets after transfusion    Tylenol and PRN benadryl given prior to transfusion. Toward the end of platelet transfusion, patient complained of itching to back of head on right side; patient developed a hive. Denied complaints of itching anywhere else, no other hives noted. Denied complaints of dyspnea, chest pain, anxiety, or lower back pain; patient mildly hypertensive, but resolved -- see doc flowsheets for details.     PRN IV hydrocortisone and 25 mg IV benadryl given with relief to itching and the hive reduced in size and redness faded.     TORB per Dr. Rodriguez on 9/27/2017 at 1330  -Not necessary to transfuse the rest of the unit of platelets.    Platelets returned to blood bank.    TORB per Dr. Rodriguez on 9/27/2017 at 1500  -Post platelet count 52,000, patient to return Friday, 9/29/17 for labs and possible platelet      Intravenous Access:  Peripheral IV placed.    Treatment Conditions:  Lab Results   Component Value Date    HGB 9.4 09/27/2017     Lab Results   Component Value Date    WBC 0.4 09/27/2017      Lab Results   Component Value Date    ANEU 2.1 09/20/2017     Lab Results   Component Value Date    PLT 52 09/27/2017      Lab Results   Component Value Date     09/20/2017                   Lab Results   Component Value Date    POTASSIUM 4.0 09/20/2017           Lab Results   Component Value Date    MAG 1.7 09/11/2017            Lab Results   Component Value Date    CR 0.60 09/20/2017                   Lab Results   Component Value Date    VIKTORIYA 9.3 09/20/2017                 Lab Results   Component Value Date    BILITOTAL 0.5 09/20/2017           Lab Results   Component Value Date    ALBUMIN 3.5 09/20/2017                    Lab Results   Component Value Date    ALT 70 09/20/2017           Lab Results   Component Value Date    AST 44 09/20/2017     Results reviewed, labs MET treatment parameters, ok to proceed with treatment.          Post Infusion Assessment:  Patient tolerated infusion without incident.  Site patent and intact, free from redness, edema or discomfort.  No evidence of extravasations.  Access discontinued per protocol.    Discharge Plan:   Patient declined prescription refills.  Discharge instructions reviewed with: Patient.  Patient and/or family verbalized understanding of discharge instructions and all questions answered.  Copy of AVS reviewed with patient and/or family.  Patient will return 9/29/2017 for next appointment.  Patient discharged in stable condition accompanied by: .  Departure Mode: Ambulatory.    Niranjan Kaba RN

## 2017-09-27 NOTE — NURSING NOTE
Chief Complaint   Patient presents with     Blood Draw     Labs collected from PIV placed by RN. Line flushed with saline. Catheter may need to be adjusted for better blood return. Vitals taken and pt checked in for appt.     Labs drawn from PIV placed by RN. Line flushed with saline. Vitals taken. Pt checked in for appointment(s).    Yumiko Swartz RN

## 2017-09-27 NOTE — PATIENT INSTRUCTIONS
HOLD LOVENOX UNTIL AFTER LAB AND INFUSION APPOINTMENT ON FRIDAY    Contact Numbers    [facility] Main Line: [phone]  [facility] Triage:  [phone]    Call triage with chills and/or temperature greater than or equal to 100.5, uncontrolled nausea/vomiting, diarrhea, constipation, dizziness, shortness of breath, chest pain, bleeding, unexplained bruising, or any new/concerning symptoms, questions/concerns.     If you are having any concerning symptoms or wish to speak to a provider before your next infusion visit, please call your care coordinator or triage to notify them so we can adequately serve you.       After Hours: [phone]    If after hours, weekends, or holidays, call main hospital  and ask for Oncology doctor on call.           September 2017 Sunday Monday Tuesday Wednesday Thursday Friday Saturday                            1     2     [facility]    8:00 AM   (15 min.)    [facility]   [place] [facility]     [facility] ONC INFUSION 120    8:30 AM   (120 min.)    ONCOLOGY INFUSION   [facility]   3     4     [facility]    9:00 AM   (15 min.)    [facility]     [facility] INFUSION 120    9:30 AM   (120 min.)    ONCOLOGY INFUSION   [facility] 5     6     [facility]    7:15 AM   (15 min.)    [facility]     [facility] RETURN    7:45 AM   (50 min.)   Kalpana Alvarado PA-C   [facility] 7     Admission    9:01 AM   Nany Chadwick MD   Unit [address] [facility]   (Discharge: 9/11/2017)     IR PICC VASCULAR    2:15 PM   (60 min.)   [id number]     9       10     CT CHEST PULMONARY EMBOLISM W   11:20 AM   (30 min.)   [facility]CT[facility]     US UPR EXT ALYSIA DUP RIGHT PORT    3:15 PM   (40 min.)   [id number]   [facility] 11     ECH COMPLETE    9:35 AM   (60 min.)   [email or web address]   [facility],   Echocardiography 12     13     [facility]   11:15 AM   (15 min.)    [facility]     [facility] RETURN   12:00 PM   (30 min.)   Lenore Rodriguez MD   [facility] Transplant 14     15     [facility] NEW    9:45 AM   (60 min.)   Pj Cunha MD   [facility] Rheumatology 16       17     18     19     20     [facility] DRAW    8:30 AM   (15 min.)    [facility] DRAW   [facility] Draw     [facility] ONC INFUSION 360    9:00 AM   (360 min.)   [facility] ON[facility] INFUSION   [facility] 21     22     23       24     25     26     27     [facility] DRAW   10:15 AM   (15 min.)    [facility] DRAW   [facility] Draw     [facility] ONC INFUSION 120   11:00 AM   (120 min.)   [facility] ONCOL[facility] INFUSION   [facility]     [facility] RETURN ARRHYTHMIA    1:15 PM   (30 min.)   Juan Eaton MD   [facility] 28     29     [facility] DRAW   10:15 AM   (15 min.)    [facility] DRAW   [facility] Draw     [facility] ONC INFUSION 120   11:00 AM   (120 min.)    ONCOLOGY INFUSION   [facility] 30 October 2017 Sunday Monday Tuesday Wednesday Thursday Friday Saturday   1     2     3     4     5     6     7       8     9     PET ONCOLOGY WHOLE BODY    7:30 AM   (45 min.)   [email or web address]   [facility] [place] PET CT 10     11     [facility] D[facility]    6:30 AM   (15 min.)    [facility]     [facility] INFUSION 360    7:00 AM   (360 min.)    ONCOLOGY INFUSION   [facility]     [facility] ONC RETURN   11:30 AM   (30 min.)   Lenore Rodriguez MD   [facility] Blood [facility] Marrow Transplant 12     13     14       15     16     17     18     19     20     21       22     23     24     25     26     27     28       29     30     [facility] NEW    9:15 AM   (30 min.)   Archana Green PA-C   [facility] 31                                       Lab Results:  Recent Results (from the past 12 hour(s))   CBC with platelets differential    Collection Time: 09/27/17 10:30 AM   Result Value Ref Range    WBC 0.4 (LL) 4.0 - 11.0 10e9/L    RBC Count 2.48 (L) 3.8 - 5.2 10e12/L    Hemoglobin 9.4 (L) 11.7 - 15.7 g/dL    Hematocrit 28.1 (L) 35.0 - 47.0 %     (H) 78 - 100 fl    MCH 37.9 (H) 26.5 - 33.0 pg    MCHC 33.5 31.5 - 36.5 g/dL    RDW 22.9 (H) 10.0 - 15.0 %    Platelet Count 24 (LL) 150 - 450 10e9/L    Diff Method Manual Differential    ABO/Rh type and screen    Collection Time: 09/27/17 10:30 AM   Result Value Ref Range    Units Ordered 1     ABO O     RH(D) Neg     Antibody Screen Neg     Test Valid Only At          [facility]    Specimen Expires 09/30/2017     Crossmatch Red Blood Cells    Blood component    Collection Time: 09/27/17 10:30 AM   Result Value Ref Range    Unit Number [id number]     Blood Component Type Red Blood Cells Leukocyte Reduced     Division Number 00     Status of Unit Ready for patient 09/27/2017 1403     Blood Product Code [id number]     Unit Status ALEJANDRA    EKG 12-lead, tracing only (Future)    Collection Time: 09/27/17  1:33 PM   Result Value Ref Range    Interpretation ECG Click View Image link to view waveform and result    Platelet count    Collection Time: 09/27/17  1:50 PM   Result Value Ref Range    Platelet Count 52 (L) 150 - 450 10e9/L   Transfusion Rxn Blood Bank Notification    Collection Time: 09/27/17  2:22 PM   Result Value Ref Range    Transfusion Rxn Blood Bank Notification Order received

## 2017-09-27 NOTE — PROGRESS NOTES
Electrophysiology Clinic Note  HPI:   Ms. Michel is a 56 year old female who has a past medical history significant for VSD s/p repair 1969, RA, HTN, insomnia, atrial tachyarrhythmias, cardiac arrest, and newly diagnosed DLBCL.     She was admitted from 5/15/17-5/23/17 with atrial tachyarrhythmias (SVT, AF, AFL) with symptoms of palpitations, fatigue, and nausea that she had intermittently starting 3 weeks prior to admission. An echo showed LVEF 40-45%, mildly reduced RVEF, moderate biatrial enlargement, mild mitral regurgitation, and moderate tricuspid regurgitation. Prior CMRI from 2015 showed normal function and no significant valvular abnormalities. She was started on Eliquis. She underwent a BRE/DCCV on 5/17/17 c/b hypotension. She then had SVT HR 170s and then went into AF/AFL all within 24 hours after DCCV. She was started on Sotalol and chemically cardioverted. However, she had nausea and thought it was from the Sotalol so this was stopped. She reverted back to AF/AFL and a rate control strategy was adopted. She proved difficult to rate control and remained highly symptomatic. Therefore, she was started on amiodarone. She then converted back to sinus. She continued to report fatigued and nausea despite being in sinus. Given difficult to control arrhthymias, CMRI done showed LVEF 55%, mildly dilated RV with focal area of dyskinesis in RVOT, severe bi-atrial enlargement, severe TR, mild/moderate MR, and DE at RV septal insertion site. RFA was discussed but was defered as patient had a UTI at the time with ESBL. CRP was elevated and remained elevated 72 at discharge. She was sent with Macrobid.  She was then readmitted on 6/19/17-8/4/17 after suffering an out of hospital cardiac arrest. Her  found her gasping for air and unresponsive in bed. He moved her off the bed, started CPR, and activated EMS. Upon EMS arrival, she was in VF. She was externally defibrillated and had ROSC in the field. She was  intubated and brought to [facility]. In the [facility], she was hypotensive and found to be in escape rhythm 43 bpm. She was taken emergently to cath lab where coronary angiogram showed normal coronary arteries. Temporary pacemaker was placed in RA given sinus arrest. She was also placed on therapeutic hypothermia. Her  reported that she had a syncopal episode while sitting in a chair on 5/26/17 that she did not seek care for. She had been having nausea, diarrhea, and intermittent vomiting over the last week and generally had not been feeling well over the last month. She has also had saddle anesthesia with lower extremity numbness that has been evaluated by neuro as an outpatient. She was started on gabapentin but no conclusive reason. She was noted to have shock liver, CELSA requiring HD, elevated CRP. She continued to have intermittent episodes of AF. Her amiodarone was stopped. She had fevers, cytopenias and was ultimately found to have DLBCL. She was started on chemo cycles. 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 During hospitalization she also developed IV extravasation and necrotic wound. She also struggled with significant hypervolemia/anesarca. She ultimately discharged to TCU on a lifevest. She  was then readmitted on 8/8/17-8/12/17 with strep-mitis intermediate resistance bacteremia felt related to PICC line and neutropenic fevers. She was treated and ultimately discharged. She was then admitted from 9/7/17-9/11/17 with RUE DVT. She was discharged on Lovenox. This has since been stopped due to low plts. An echo from 9/11/17 showed LVEF 60-65%, mild pulm HTN, and dilated IVC.     She presents today to establish out patient EP care for atrial tachyarrhythmias and consideration for ICD. She reports she is making strides in her recovery. She is still somewhat weak and uses assist devices (walker) when moving around. However, she feels she is gaining  strength and making improvements every day. She continues to have some episodes of AF; however, she states she only feels mild palpitations and her pulse rate is controlled. She still has LUE wound which is healing but still open. She is undergoing chemo and has 3 more cycles. She anticipates being done with chemo cycles around November. She will then have a repeat PET scan to determine next steps. She reports that the lifevest was uncomfortable and she has mailed it back. She denies any chest pain/pressures, dizziness, lightheadedness, PND, orthopnea, or syncopal symptoms. Presenting 12 lead ECG shows SB Vent Rate 58 bpm,  ms, QRS 80 ms, QTc 414 ms. Current cardiac medications include: digoxin and atenolol.     PAST MEDICAL HISTORY:  Past Medical History:   Diagnosis Date     A-fib (H)      Cardiac arrest (H)      Hypertension      Neuropathy (H)      Rheumatoid arthritis(714.0)        CURRENT MEDICATIONS:  Current Outpatient Prescriptions   Medication Sig Dispense Refill     DiphenhydrAMINE HCl (BENADRYL ALLERGY PO) Take 50 mg by mouth       HYDROCORTISONE PO Take 100 mg by mouth IV       fluconazole (DIFLUCAN) 100 MG tablet Take 1 tablet (100 mg) by mouth daily 30 tablet 0     acyclovir (ZOVIRAX) 400 MG tablet Take 1 tablet (400 mg) by mouth 2 times daily 60 tablet 0     atenolol (TENORMIN) 25 MG tablet Take 1 tablet (25 mg) by mouth 2 times daily 60 tablet 0     digoxin (LANOXIN) 125 MCG tablet Take 1 tablet (125 mcg) by mouth daily 30 tablet 0     acetaminophen (TYLENOL) 325 MG tablet Take 2 tablets (650 mg) by mouth every 4 hours as needed for mild pain, fever or headaches       potassium chloride SA (K-DUR/KLOR-CON M) 20 MEQ CR tablet Take 2 tablets (40 mEq) by mouth daily while taking furosemide (Lasix). You can decrease potassium to 20 mEq daily once off Lasix. 60 tablet 1     potassium & sodium phosphates (NEUTRA-PHOS) 280-160-250 MG Packet Take 1 packet by mouth 2 times daily 60 packet 1      enoxaparin (LOVENOX) 60 MG/0.6ML injection Inject 0.6 mLs (60 mg) Subcutaneous every 12 hours for 30 days 60 Syringe 1     calcium polycarbophil (FIBERCON) 625 MG tablet Take 1 tablet by mouth 2 times daily       gabapentin (NEURONTIN) 100 MG capsule Take 2 capsules (200 mg) by mouth 3 times daily 120 capsule 0     calcium-vitamin D (CALTRATE) 600-400 MG-UNIT per tablet Take 2 tablets by mouth every morning 60 tablet 0     multivitamin, therapeutic with minerals (THERA-VIT-M) TABS tablet Take 1 tablet by mouth daily 30 each 0     ascorbic acid 500 MG TABS Take 1 tablet (500 mg) by mouth daily 30 tablet 0     magic mouthwash suspension (diphenhydrAMINE, lidocaine, aluminum-magnesium & simethicone) Swish and swallow 10 mLs in mouth 2 times daily       predniSONE (DELTASONE) 5 MG tablet Take 1 tablet (5 mg) by mouth daily (Patient not taking: Reported on 9/20/2017) 30 tablet 0     traMADol (ULTRAM) 50 MG tablet Take 1 tablet (50 mg) by mouth every 6 hours as needed for moderate pain (Patient not taking: Reported on 9/27/2017) 30 tablet 0     leucovorin 15 MG TABS Take 1 tablet (15 mg) by mouth every 6 hours for 3 days 12 tablet 1     furosemide (LASIX) 20 MG tablet Take 1 tablet (20 mg) by mouth daily for 5 days 5 tablet 1     ondansetron (ZOFRAN-ODT) 8 MG ODT tab Take 1 tablet (8 mg) by mouth every 8 hours as needed (Patient not taking: Reported on 9/27/2017) 20 tablet 2       PAST SURGICAL HISTORY:  Past Surgical History:   Procedure Laterality Date     ANESTHESIA CARDIOVERSION N/A 5/17/2017    Procedure: ANESTHESIA CARDIOVERSION;  ANESTHESIA CARDIOVERSION;  Surgeon: GENERIC ANESTHESIA PROVIDER;  Location: [facility] OR     ARTHRODESIS WRIST  2000    Right wrist     BONE MARROW ASPIRATE &BIOPSY  7/12/2017          FOOT SURGERY      4 left and 2 right     RELEASE CARPAL TUNNEL BILATERAL       REPAIR VENTRICULAR SEPTAL DEFECT  1969       ALLERGIES:     Allergies   Allergen Reactions     Blood Transfusion Related (Informational  "Only) Hives     Hives with platelets. Give benadryl premedication.     Metoprolol Other (See Comments)     Pt and  report that metoprolol does not work for her and also reports feeling unwell with this medication. She has been able to tolerate atenolol, which as worked in controlling her HR.      No Clinical Screening - See Comments      Penicillin Allergy Skin Test not performed, see antimicrobial management team progress note 7/5/17.       Penicillins      Tape [Adhesive Tape] Rash       FAMILY HISTORY:  Family History   Problem Relation Age of Onset     Breast Cancer Mother      Hypertension Mother      Alzheimer Disease Mother      Hypertension Father      Blood Disease Father      Lymphoa     Circulatory Father      A Fib     DIABETES Paternal Grandmother        SOCIAL HISTORY:  Social History   Substance Use Topics     Smoking status: Never Smoker     Smokeless tobacco: Never Used     Alcohol use Yes      Comment: Glass of wine two evenings a night       ROS:   A comprehensive 10 point review of systems negative other than as mentioned in HPI.  Exam:  /69  Pulse 64  Ht 1.473 m (4' 10\")  Wt 54.4 kg (120 lb)  BMI 25.08 kg/m2  GENERAL APPEARANCE: alert and no distress  HEENT: alopecia, MMM, PERRLA.   NECK:supple.   RESPIRATORY: lungs clear to auscultation - no rales, rhonchi or wheezes, no use of accessory muscles, no retractions, respirations are unlabored, normal respiratory rate  CARDIOVASCULAR: regular rhythm, normal S1 with physiologic split S2, no S3 or S4 and no murmur, click or rub, precordium quiet with normal PMI.  ABDOMEN: soft, non tender,bowel sounds normal  EXTREMITIES: peripheral pulses normal, 1+BLE edema, LUE wound with dressing CDI.  NEURO: alert and oriented to person/place/time, normal speech, gait and affect  SKIN: pale, fragile, no rashes    Labs:  Reviewed.     Testing/Procedures:  9/11/17 ECHOCARDIOGRAM:   Interpretation Summary  Left ventricular function, chamber size, " wall motion, and wall thickness are  normal.The EF is 60-65% (traced 60%.) GLS -18%.  The right ventricle is normal size and normal in function. The RV is mildly  dilated.  Moderate tricuspid insufficiency is present.  Mild pulmonary hypertension is present (esimated PASP 55 mmHg including RA  pressure.)  Dilation of the inferior vena cava is present with abnormal respiratory  variation in diameter (esitimated RAP 15 mmHg.)  This study was compared with the study from 8/31/17: TR appears slightly  decreased from the earlier study.    7/24/17 CMRI:     1. The LV is normal in cavity size and wall thickness. The global systolic function is normal. The LVEF is  64%. There are no regional wall motion abnormalities. No evidence of myocardial iron overload.      2. The RV is normal in cavity size. The global systolic function is normal. The RVEF is 59%.      3. There is moderate dilation of bilateral atria.      4. Tricuspid regurgitation is present, difficult to quantify due to image quality.      5. Late gadolinium enhancement imaging shows RV insertion site hyperenhancement, a non-specific finding  seen in patients with RV volume/pressure overload.      6. There is a moderate right pleural effusion and small left pleural effusion.      CONCLUSIONS: Normal Bi-Ventricular systolic function, LVEF 64% and RVE 59%. There is no evidence of  infiltrative disease. Compared to the MRI from 5/15/2017, there is no significant change.     7/2017 BRE:  Interpretation Summary  There is a small mass (0.7 cm by 0.2 cm) on the ventricular side of the right  coronary cusp. There is a second mass (0.4 cm by 0.2 cm) observed in the LVOT,  attached to the mitro-aortic curtain. There is a third mass on the noncoronary  cusp that measures 0.4 cm by 0.2 cm that could be a healing vegetation. These  findings are compatible with endocarditis if clinical picture supports.  Right ventricular function, chamber size, wall motion, and thickness  are  normal.  This study was compared with the study from 7/10/2017.  There is detection of masses on the aortic valve and LVOT.  Assessment and Plan:   Ms. Michel is a 56 year old female who has a past medical history significant for VSD s/p repair 1969, RA, HTN, insomnia, atrial tachyarrhythmias, cardiac arrest, and newly diagnosed DLBCL.   She initially presented in 2017 with symptoamtic atrial tachyarrhythmias (SVT, AF, AFL). She underwent DCCV with early recurrence and was started on AAT. She was thought to not tolerate Sotalol and was switched to amiodarone. However, she continued to have fatigue, nausea, and general malaise despite being in sinus. She then suffered out of hospital cardiac arrest and was found in VF by EMS and defibrillated in field. She had a prolonged hospital course c/b anasarca, CELSA, shock liver, fevers, cytopenias and was ultimately found to have DLBCL and started on treatment. She presents today to establish out patient EP care for atrial tachyarrhythmias and consideration for ICD. She reports she is making strides in her recovery. She continues to have some episodes of AF; however, she states she only feels mild palpitations and her pulse rate is controlled.     Atrial Fibrillation:  We discussed in detail with the patient management/treatment options for Krystyna includin. Stroke Prophylaxis:  CHADSVASC=+HTN, +gender  2, corresponding to a 2.2% annual stroke / systemic emolism event rate. indicating need for long term oral anticoagulation.  She is unable to be anticoagulated now due to low plts r/t chemotherapy. We would consider resuming anticoagulation with warfarin when deemed feasible per Oncology team.   2. Rate Control: Continue Atenolol and Digoxin. Will likely stop digoxin in the future.    3. Rhythm Control: Cardioversion, Antiarrhythmics and/or ablation are options for rhythm control. She underwent DCCV with early recurrence. She has possible side effects from Sotalol  (however, likely was due to underlying illness at the time and not from medication). Was on amiodarone with continued episodes and stopped. She has been under rate control strategy only currently. She has intermittent episodes that are not very bothersome to her. We will get a 2 week zio patch to monitor burden. We will not pursue rhythm control at this time.     4. Risk Factor Management: maintain tight BP control, and PB evaluation as indicated.       Cardiac arrest of unknown etiology  - Unlikely that amiodarone would be cause of arrest. Presenting rhythm did show sinus shut down; however, she had viable escape that should not result in VT/VF cardiac arrest.   - She had been on Lifevest; however, she has since mailed it back as it was uncomfortable to her. She does not want to resume.   - We discussed that she does have indication for secondary prevention ICD. We discussed options of endocardial vs. Subcutaneous ICD. If she elects for ICD, we would tend to favor subQ ICD implant as would have less infectious risk, and she has Hx of clotting with central line. ICD would best be implanted after she has finished with chemo cycles and blood counts start to normalize. This would allow for most optimal healing and less bleeding risk, as her current wound on the left arm stopped healing with the chemotherapy which indicates her ICD incision would not heal well either. Furthermore, we would want LUE wound to be more healed. She is going to be having repeat PET scan in December and will have better idea of efficacy of current treatments, next steps, and prognosis at that time. We will await these results and then re-discuss ICD implant with her to finalize her decision.    Follow up in January 2018.     The patient states understanding and is agreeable with plan.   This patient was seen and evaluated with Dr. Eaton. The above note reflects our joint assessment and plan.   ELIZABETH Verde CNP  Pager: [phone]    EP STAFF  NOTE  I have seen and examined the patient as part of a shared visit with GERA Verde NP.  I agree with the note above. I reviewed today's vital signs and medications. I have reviewed and discussed with the advanced practice provider their physical examination, assessment, and plan   Briefly, patient in need for ICD for secondary prevention. We discussed that we suspect she might have a secondary cause when she had her cardiac arrest with her seere bradycardia driven by amio and possible torsades, along with metabolic derangements, but we cold not guarantee that this was the case. She refuses to wear the lifevest and does not want an ICD at this stage. She wants to wait for better healing and we agree as per NP's note above that she needs more recovery.  My key history/exam findings are: RRR.   The key management decisions made by me: will reconvene after finishing the chemotherapy and after better healing and recovery, hopefully higher platelets, and better idea about remission..    Juan Eaton MD [facility]  Cardiology - Electrophysiology

## 2017-09-27 NOTE — LETTER
9/27/2017      RE: Amelia Michel  [address]  [facility] [address]       Dear Colleague,    Thank you for the opportunity to participate in the care of your patient, Amelia Michel, at the [facility]. Please see a copy of my visit note below.    Electrophysiology Clinic Note  HPI:   Ms. Michel is a 56 year old female who has a past medical history significant for VSD s/p repair 1969, RA, HTN, insomnia, atrial tachyarrhythmias, cardiac arrest, and newly diagnosed DLBCL.     She was admitted from 5/15/17-5/23/17 with atrial tachyarrhythmias (SVT, AF, AFL) with symptoms of palpitations, fatigue, and nausea that she had intermittently starting 3 weeks prior to admission. An echo showed LVEF 40-45%, mildly reduced RVEF, moderate biatrial enlargement, mild mitral regurgitation, and moderate tricuspid regurgitation. Prior CMRI from 2015 showed normal function and no significant valvular abnormalities. She was started on Eliquis. She underwent a BRE/DCCV on 5/17/17 c/b hypotension. She then had SVT HR 170s and then went into AF/AFL all within 24 hours after DCCV. She was started on Sotalol and chemically cardioverted. However, she had nausea and thought it was from the Sotalol so this was stopped. She reverted back to AF/AFL and a rate control strategy was adopted. She proved difficult to rate control and remained highly symptomatic. Therefore, she was started on amiodarone. She then converted back to sinus. She continued to report fatigued and nausea despite being in sinus. Given difficult to control arrhthymias, CMRI done showed LVEF 55%, mildly dilated RV with focal area of dyskinesis in RVOT, severe bi-atrial enlargement, severe TR, mild/moderate MR, and DE at RV septal insertion site. RFA was discussed but was defered as patient had a UTI at the time with ESBL. CRP was elevated and remained elevated 72 at discharge. She was sent with  Macrobid.  She was then readmitted on 6/19/17-8/4/17 after suffering an out of hospital cardiac arrest. Her  found her gasping for air and unresponsive in bed. He moved her off the bed, started CPR, and activated EMS. Upon EMS arrival, she was in VF. She was externally defibrillated and had ROSC in the field. She was intubated and brought to [facility]. In the U, she was hypotensive and found to be in escape rhythm 43 bpm. She was taken emergently to cath lab where coronary angiogram showed normal coronary arteries. Temporary pacemaker was placed in RA given sinus arrest. She was also placed on therapeutic hypothermia. Her  reported that she had a syncopal episode while sitting in a chair on 5/26/17 that she did not seek care for. She had been having nausea, diarrhea, and intermittent vomiting over the last week and generally had not been feeling well over the last month. She has also had saddle anesthesia with lower extremity numbness that has been evaluated by neuro as an outpatient. She was started on gabapentin but no conclusive reason. She was noted to have shock liver, CELSA requiring HD, elevated CRP. She continued to have intermittent episodes of AF. Her amiodarone was stopped. She had fevers, cytopenias and was ultimately found to have DLBCL. She was started on chemo cycles. 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 During hospitalization she also developed IV extravasation and necrotic wound. She also struggled with significant hypervolemia/anesarca. She ultimately discharged to TCU on a lifevest. She  was then readmitted on 8/8/17-8/12/17 with strep-mitis intermediate resistance bacteremia felt related to PICC line and neutropenic fevers. She was treated and ultimately discharged. She was then admitted from 9/7/17-9/11/17 with RUE DVT. She was discharged on Lovenox. This has since been stopped due to low plts. An echo from 9/11/17 showed  LVEF 60-65%, mild pulm HTN, and dilated IVC.     She presents today to establish out patient EP care for atrial tachyarrhythmias and consideration for ICD. She reports she is making strides in her recovery. She is still somewhat weak and uses assist devices (walker) when moving around. However, she feels she is gaining strength and making improvements every day. She continues to have some episodes of AF; however, she states she only feels mild palpitations and her pulse rate is controlled. She still has LUE wound which is healing but still open. She is undergoing chemo and has 3 more cycles. She anticipates being done with chemo cycles around November. She will then have a repeat PET scan to determine next steps. She reports that the lifevest was uncomfortable and she has mailed it back. She denies any chest pain/pressures, dizziness, lightheadedness, PND, orthopnea, or syncopal symptoms. Presenting 12 lead ECG shows SB Vent Rate 58 bpm,  ms, QRS 80 ms, QTc 414 ms. Current cardiac medications include: digoxin and atenolol.     PAST MEDICAL HISTORY:  Past Medical History:   Diagnosis Date     A-fib (H)      Cardiac arrest (H)      Hypertension      Neuropathy (H)      Rheumatoid arthritis(714.0)        CURRENT MEDICATIONS:  Current Outpatient Prescriptions   Medication Sig Dispense Refill     DiphenhydrAMINE HCl (BENADRYL ALLERGY PO) Take 50 mg by mouth       HYDROCORTISONE PO Take 100 mg by mouth IV       fluconazole (DIFLUCAN) 100 MG tablet Take 1 tablet (100 mg) by mouth daily 30 tablet 0     acyclovir (ZOVIRAX) 400 MG tablet Take 1 tablet (400 mg) by mouth 2 times daily 60 tablet 0     atenolol (TENORMIN) 25 MG tablet Take 1 tablet (25 mg) by mouth 2 times daily 60 tablet 0     digoxin (LANOXIN) 125 MCG tablet Take 1 tablet (125 mcg) by mouth daily 30 tablet 0     acetaminophen (TYLENOL) 325 MG tablet Take 2 tablets (650 mg) by mouth every 4 hours as needed for mild pain, fever or headaches       potassium  chloride SA (K-DUR/KLOR-CON M) 20 MEQ CR tablet Take 2 tablets (40 mEq) by mouth daily while taking furosemide (Lasix). You can decrease potassium to 20 mEq daily once off Lasix. 60 tablet 1     potassium & sodium phosphates (NEUTRA-PHOS) 280-160-250 MG Packet Take 1 packet by mouth 2 times daily 60 packet 1     enoxaparin (LOVENOX) 60 MG/0.6ML injection Inject 0.6 mLs (60 mg) Subcutaneous every 12 hours for 30 days 60 Syringe 1     calcium polycarbophil (FIBERCON) 625 MG tablet Take 1 tablet by mouth 2 times daily       gabapentin (NEURONTIN) 100 MG capsule Take 2 capsules (200 mg) by mouth 3 times daily 120 capsule 0     calcium-vitamin D (CALTRATE) 600-400 MG-UNIT per tablet Take 2 tablets by mouth every morning 60 tablet 0     multivitamin, therapeutic with minerals (THERA-VIT-M) TABS tablet Take 1 tablet by mouth daily 30 each 0     ascorbic acid 500 MG TABS Take 1 tablet (500 mg) by mouth daily 30 tablet 0     magic mouthwash suspension (diphenhydrAMINE, lidocaine, aluminum-magnesium & simethicone) Swish and swallow 10 mLs in mouth 2 times daily       predniSONE (DELTASONE) 5 MG tablet Take 1 tablet (5 mg) by mouth daily (Patient not taking: Reported on 9/20/2017) 30 tablet 0     traMADol (ULTRAM) 50 MG tablet Take 1 tablet (50 mg) by mouth every 6 hours as needed for moderate pain (Patient not taking: Reported on 9/27/2017) 30 tablet 0     leucovorin 15 MG TABS Take 1 tablet (15 mg) by mouth every 6 hours for 3 days 12 tablet 1     furosemide (LASIX) 20 MG tablet Take 1 tablet (20 mg) by mouth daily for 5 days 5 tablet 1     ondansetron (ZOFRAN-ODT) 8 MG ODT tab Take 1 tablet (8 mg) by mouth every 8 hours as needed (Patient not taking: Reported on 9/27/2017) 20 tablet 2       PAST SURGICAL HISTORY:  Past Surgical History:   Procedure Laterality Date     ANESTHESIA CARDIOVERSION N/A 5/17/2017    Procedure: ANESTHESIA CARDIOVERSION;  ANESTHESIA CARDIOVERSION;  Surgeon: GENERIC ANESTHESIA PROVIDER;  Location:   "OR     ARTHRODESIS WRIST  2000    Right wrist     BONE MARROW ASPIRATE &BIOPSY  7/12/2017          FOOT SURGERY      4 left and 2 right     RELEASE CARPAL TUNNEL BILATERAL       REPAIR VENTRICULAR SEPTAL DEFECT  1969       ALLERGIES:     Allergies   Allergen Reactions     Blood Transfusion Related (Informational Only) Hives     Hives with platelets. Give benadryl premedication.     Metoprolol Other (See Comments)     Pt and  report that metoprolol does not work for her and also reports feeling unwell with this medication. She has been able to tolerate atenolol, which as worked in controlling her HR.      No Clinical Screening - See Comments      Penicillin Allergy Skin Test not performed, see antimicrobial management team progress note 7/5/17.       Penicillins      Tape [Adhesive Tape] Rash       FAMILY HISTORY:  Family History   Problem Relation Age of Onset     Breast Cancer Mother      Hypertension Mother      Alzheimer Disease Mother      Hypertension Father      Blood Disease Father      Lymphoa     Circulatory Father      A Fib     DIABETES Paternal Grandmother        SOCIAL HISTORY:  Social History   Substance Use Topics     Smoking status: Never Smoker     Smokeless tobacco: Never Used     Alcohol use Yes      Comment: Glass of wine two evenings a night       ROS:   A comprehensive 10 point review of systems negative other than as mentioned in HPI.  Exam:  /69  Pulse 64  Ht 1.473 m (4' 10\")  Wt 54.4 kg (120 lb)  BMI 25.08 kg/m2  GENERAL APPEARANCE: alert and no distress  HEENT: alopecia, MMM, PERRLA.   NECK:supple.   RESPIRATORY: lungs clear to auscultation - no rales, rhonchi or wheezes, no use of accessory muscles, no retractions, respirations are unlabored, normal respiratory rate  CARDIOVASCULAR: regular rhythm, normal S1 with physiologic split S2, no S3 or S4 and no murmur, click or rub, precordium quiet with normal PMI.  ABDOMEN: soft, non tender,bowel sounds normal  EXTREMITIES: " peripheral pulses normal, 1+BLE edema, LUE wound with dressing CDI.  NEURO: alert and oriented to person/place/time, normal speech, gait and affect  SKIN: pale, fragile, no rashes    Labs:  Reviewed.     Testing/Procedures:  9/11/17 ECHOCARDIOGRAM:   Interpretation Summary  Left ventricular function, chamber size, wall motion, and wall thickness are  normal.The EF is 60-65% (traced 60%.) GLS -18%.  The right ventricle is normal size and normal in function. The RV is mildly  dilated.  Moderate tricuspid insufficiency is present.  Mild pulmonary hypertension is present (esimated PASP 55 mmHg including RA  pressure.)  Dilation of the inferior vena cava is present with abnormal respiratory  variation in diameter (esitimated RAP 15 mmHg.)  This study was compared with the study from 8/31/17: TR appears slightly  decreased from the earlier study.    7/24/17 CMRI:     1. The LV is normal in cavity size and wall thickness. The global systolic function is normal. The LVEF is  64%. There are no regional wall motion abnormalities. No evidence of myocardial iron overload.      2. The RV is normal in cavity size. The global systolic function is normal. The RVEF is 59%.      3. There is moderate dilation of bilateral atria.      4. Tricuspid regurgitation is present, difficult to quantify due to image quality.      5. Late gadolinium enhancement imaging shows RV insertion site hyperenhancement, a non-specific finding  seen in patients with RV volume/pressure overload.      6. There is a moderate right pleural effusion and small left pleural effusion.      CONCLUSIONS: Normal Bi-Ventricular systolic function, LVEF 64% and RVE 59%. There is no evidence of  infiltrative disease. Compared to the MRI from 5/15/2017, there is no significant change.     7/2017 BRE:  Interpretation Summary  There is a small mass (0.7 cm by 0.2 cm) on the ventricular side of the right  coronary cusp. There is a second mass (0.4 cm by 0.2 cm) observed in  the LVOT,  attached to the mitro-aortic curtain. There is a third mass on the noncoronary  cusp that measures 0.4 cm by 0.2 cm that could be a healing vegetation. These  findings are compatible with endocarditis if clinical picture supports.  Right ventricular function, chamber size, wall motion, and thickness are  normal.  This study was compared with the study from 7/10/2017.  There is detection of masses on the aortic valve and LVOT.  Assessment and Plan:   Ms. Michel is a 56 year old female who has a past medical history significant for VSD s/p repair 1969, RA, HTN, insomnia, atrial tachyarrhythmias, cardiac arrest, and newly diagnosed DLBCL.   She initially presented in 2017 with symptoamtic atrial tachyarrhythmias (SVT, AF, AFL). She underwent DCCV with early recurrence and was started on AAT. She was thought to not tolerate Sotalol and was switched to amiodarone. However, she continued to have fatigue, nausea, and general malaise despite being in sinus. She then suffered out of hospital cardiac arrest and was found in VF by EMS and defibrillated in field. She had a prolonged hospital course c/b anasarca, CELSA, shock liver, fevers, cytopenias and was ultimately found to have DLBCL and started on treatment. She presents today to establish out patient EP care for atrial tachyarrhythmias and consideration for ICD. She reports she is making strides in her recovery. She continues to have some episodes of AF; however, she states she only feels mild palpitations and her pulse rate is controlled.     Atrial Fibrillation:  We discussed in detail with the patient management/treatment options for Krystyna includin. Stroke Prophylaxis:  CHADSVASC=+HTN, +gender  2, corresponding to a 2.2% annual stroke / systemic emolism event rate. indicating need for long term oral anticoagulation.  She is unable to be anticoagulated now due to low plts r/t chemotherapy. We would consider resuming anticoagulation with warfarin when  deemed feasible per Oncology team.   2. Rate Control: Continue Atenolol and Digoxin. Will likely stop digoxin in the future.    3. Rhythm Control: Cardioversion, Antiarrhythmics and/or ablation are options for rhythm control. She underwent DCCV with early recurrence. She has possible side effects from Sotalol (however, likely was due to underlying illness at the time and not from medication). Was on amiodarone with continued episodes and stopped. She has been under rate control strategy only currently. She has intermittent episodes that are not very bothersome to her. We will get a 2 week zio patch to monitor burden. We will not pursue rhythm control at this time.     4. Risk Factor Management: maintain tight BP control, and PB evaluation as indicated.       Cardiac arrest of unknown etiology  - Unlikely that amiodarone would be cause of arrest. Presenting rhythm did show sinus shut down; however, she had viable escape that should not result in VT/VF cardiac arrest.   - She had been on Lifevest; however, she has since mailed it back as it was uncomfortable to her. She does not want to resume.   - We discussed that she does have indication for secondary prevention ICD. We discussed options of endocardial vs. Subcutaneous ICD. If she elects for ICD, we would tend to favor subQ ICD implant as would have less infectious risk, and she has Hx of clotting with central line. ICD would best be implanted after she has finished with chemo cycles and blood counts start to normalize. This would allow for most optimal healing and less bleeding risk, as her current wound on the left arm stopped healing with the chemotherapy which indicates her ICD incision would not heal well either. Furthermore, we would want LUE wound to be more healed. She is going to be having repeat PET scan in December and will have better idea of efficacy of current treatments, next steps, and prognosis at that time. We will await these results and then  re-discuss ICD implant with her to finalize her decision.    Follow up in January 2018.     The patient states understanding and is agreeable with plan.   This patient was seen and evaluated with Dr. Eaton. The above note reflects our joint assessment and plan.   ELIZABETH Verde CNP  Pager: [phone]    EP STAFF NOTE  I have seen and examined the patient as part of a shared visit with GERA Verde NP.  I agree with the note above. I reviewed today's vital signs and medications. I have reviewed and discussed with the advanced practice provider their physical examination, assessment, and plan   Briefly, patient in need for ICD for secondary prevention. We discussed that we suspect she might have a secondary cause when she had her cardiac arrest with her seere bradycardia driven by amio and possible torsades, along with metabolic derangements, but we cold not guarantee that this was the case. She refuses to wear the lifevest and does not want an ICD at this stage. She wants to wait for better healing and we agree as per NP's note above that she needs more recovery.  My key history/exam findings are: RRR.   The key management decisions made by me: will reconvene after finishing the chemotherapy and after better healing and recovery, hopefully higher platelets, and better idea about remission..    Juan Eaton MD [facility]  Cardiology - Electrophysiology

## 2017-09-27 NOTE — PATIENT INSTRUCTIONS
Cardiology Provider you saw in clinic today: Dr. Eaton    Labs/Tests needed:     1. Leatha     Follow-up Visit:  4 months         Please contact us via Aqua-tools for questions or concerns.    Stacie Delgado RN   Electrophysiology Nurse Coordinator.  [phone]    If your question concerns the schedule/appointment times, contact:  [facility]   [phone]    Device Clinic (Pacemakers, ICD, Loop Recorders)   [phone]      You will receive all normal lab and testing results via Aqua-tools or mail if not reviewed in clinic today.   If you need a medication refill please contact your pharmacy.    As always, thank you for trusting us with your health care needs!

## 2017-09-27 NOTE — NURSING NOTE
Per Juan Faulkner patient to have 14 days ziopatch monitor placed.  Diagnosis: Atrial Tachycardia  Monitor placed: Yes  Patient Instructed: Yes  Patient verbalized understanding: Yes  Holter # [id number]  Placed By: Lesli Glaser MA

## 2017-09-27 NOTE — NURSING NOTE
Chief Complaint   Patient presents with     Follow Up For     Discuss ICD implant. EKG     Vitals were taken and medication were reconciled. EKG performed.    Luzma Fallon CMA    1:29 PM

## 2017-09-27 NOTE — MR AVS SNAPSHOT
After Visit Summary   9/27/2017    Amelia Michel    MRN: [id number]           Patient Information     Date Of Birth          1960        Visit Information        Provider Department      9/27/2017 1:30 PM Juan Eaton MD [facility]        Today's Diagnoses     Atrial tachycardia (H)          Care Instructions    Cardiology Provider you saw in clinic today: Dr. Eaton    Labs/Tests needed:     1. Ziopatch     Follow-up Visit:  4 months         Please contact us via [employer] for questions or concerns.    Stacie Delgado RN   Electrophysiology Nurse Coordinator.  [phone]    If your question concerns the schedule/appointment times, contact:  [facility]   [phone]    Device Clinic (Pacemakers, ICD, Loop Recorders)   [phone]      You will receive all normal lab and testing results via [employer] or mail if not reviewed in clinic today.   If you need a medication refill please contact your pharmacy.    As always, thank you for trusting us with your health care needs!            Follow-ups after your visit        Follow-up notes from your care team     Return in about 4 months (around 1/27/2018) for Dr. Eaton.      Your next 10 appointments already scheduled     Sep 29, 2017 10:15 AM CDT   [facility] Lab Draw with  [facility] LAB DRAW   [facility] Lab Draw ([facility])    [address] [place] [address] [phone]            Sep 29, 2017 11:00 AM CDT   Infusion 120 with UC ONCOLOGY INFUSION, UC 25 ATC   [facility] ([facility])    [address] [id number] [phone]            Oct 09, 2017  7:30 AM CDT   PET ONCOLOGY WHOLE BODY with UUPET1   [facility] [place] PET [facility] ([facility])    [address] [facility] [id number] [phone]           Tell your doctor:   If  there is any chance you may be pregnant or if you are breastfeeding.   If you have problems lying in small spaces (claustrophobia). If you do, your doctor may give you medicine to help you relax. If you have diabetes:   Have your exam early in the morning. Your blood glucose will go up as the day goes by.   Your glucose level must be 180 or less at the start of the exam. Please take any medicines you need to ensure this blood glucose level. 24 hours before your scan: Don t do any heavy exercise. (No jogging, aerobics or other workouts.) Exercise will make your pictures less accurate. 6 hours before your scan:   Stop all food and liquids (except water).   Do not chew gum or suck on mints.   If you need to take medicine with food, you may take it with a few crackers.  Please call your Imaging Department at your exam site with any questions.            Oct 11, 2017  6:30 AM CDT   Masonic Lab Draw with  MASONIC LAB DRAW   [facility] Mason[facility] Lab Draw ([facility])    [address] [place] [address]  [address] [facility] [address]   [phone]            Oct 11, 2017  7:00 AM CDT   Infusion 360 with  ONCOLOGY INFUSION, UC 12 ATC   [facility] ([facility])    [address] [place] [address]   [phone]            Oct 11, 2017 11:30 AM CDT   RETURN ONC with Lenore Rodriguez MD   [facility] Blood and Marrow Transplant ([facility])    [address] [place] [address]   [phone]            Oct 30, 2017  9:30 AM CDT   (Arrive by 9:15 AM)   New Patient Visit with Archana Green PA-C   [facility] Urology and Inst for Prostate and Urologic Cancers ([facility])    [address] [place] [address]  [address] [facility] [id number] [phone]            Nov 01, 2017  8:00 AM CDT   [facility]ic Lab Draw with  MASONIC LAB DRAW   [facility] Mason[facility] Lab Draw ([facility]  [facility])    [address] [facility]  [address] [facility] [address] [phone]              Future tests that were ordered for you today     Open Standing Orders        Priority Remaining Interval Expires Ordered    Transfuse platelets unit Routine 99/100 TRANSFUSE 1 DOSE  9/27/2017    Red blood cell prepare order unit Routine 99/100 CONDITIONAL (SPECIFY) BLOOD  9/26/2017    Platelets prepare order unit Routine 99/100 CONDITIONAL (SPECIFY) BLOOD  9/26/2017          Open Future Orders        Priority Expected Expires Ordered    Zio Patch Holter Routine  9/27/2018 9/27/2017            Who to contact     If you have questions or need follow up information about today's clinic visit or your schedule please contact [facility] directly at [phone].  Normal or non-critical lab and imaging results will be communicated to you by [facility], letter or phone within 4 business days after the clinic has received the results. If you do not hear from us within 7 days, please contact the clinic through [facility] [employer] [facility] or phone. If you have a critical or abnormal lab result, we will notify you by phone as soon as possible.  Submit refill requests through [employer] or call your pharmacy and they will forward the refill request to us. Please allow 3 business days for your refill to be completed.          Additional Information About Your Visit        [employer] Information     [employer] gives you secure access to your electronic health record. If you see a primary care provider, you can also send messages to your care team and make appointments. If you have questions, please call your primary care clinic.  If you do not have a primary care provider, please call [phone] and they will assist you.        Care EveryWhere ID     This is your Care EveryWhere ID. This could be used by other organizations to access your [facility] medical records  [id number]        Your Vitals Were     Pulse Height BMI (Body Mass  "Index)             64 1.473 m (4' 10\") 25.08 kg/m2          Blood Pressure from Last 3 Encounters:   09/27/17 137/69   09/27/17 134/66   09/20/17 113/65    Weight from Last 3 Encounters:   09/27/17 54.4 kg (120 lb)   09/27/17 55.8 kg (123 lb)   09/20/17 54.3 kg (119 lb 9.6 oz)              We Performed the Following     EKG 12-lead, tracing only (Future)        Primary Care Provider Office Phone # Fax #    Comfort Sabillon [phone] [address] [facility] [address]        Equal Access to Services     CATINA HEAD : Kay Flores, khari ibarra, krystle kaalmaalana bae, durga hermosillo . So [facility] [phone].    ATENCIÓN: Si habla español, tiene a sarmiento disposición servicios gratuitos de asistencia lingüística. Llame al [phone].    We comply with applicable federal civil rights laws and [place] laws. We do not discriminate on the basis of race, color, national origin, age, disability sex, sexual orientation or gender identity.            Thank you!     Thank you for choosing [facility]  for your care. Our goal is always to provide you with excellent care. Hearing back from our patients is one way we can continue to improve our services. Please take a few minutes to complete the written survey that you may receive in the mail after your visit with us. Thank you!             Your Updated Medication List - Protect others around you: Learn how to safely use, store and throw away your medicines at www.disposemymeds.org.          This list is accurate as of: 9/27/17  3:52 PM.  Always use your most recent med list.                   Brand Name Dispense Instructions for use Diagnosis    acetaminophen 325 MG tablet    TYLENOL     Take 2 tablets (650 mg) by mouth every 4 hours as needed for mild pain, fever or headaches    Diffuse large B-cell lymphoma of extranodal site (H)       acyclovir 400 MG tablet    ZOVIRAX    60 tablet    Take 1 " tablet (400 mg) by mouth 2 times daily    Diffuse large B-cell lymphoma, unspecified body region (H)       ascorbic acid 500 MG Tabs     30 tablet    Take 1 tablet (500 mg) by mouth daily    Diffuse large B-cell lymphoma, unspecified body region (H)       atenolol 25 MG tablet    TENORMIN    60 tablet    Take 1 tablet (25 mg) by mouth 2 times daily    Atrial tachycardia (H)       BENADRYL ALLERGY PO      Take 50 mg by mouth        calcium polycarbophil 625 MG tablet    FIBERCON     Take 1 tablet by mouth 2 times daily        calcium-vitamin D 600-400 MG-UNIT per tablet    CALTRATE    60 tablet    Take 2 tablets by mouth every morning    Diffuse large B-cell lymphoma, unspecified body region (H)       digoxin 125 MCG tablet    LANOXIN    30 tablet    Take 1 tablet (125 mcg) by mouth daily    Atrial tachycardia (H)       enoxaparin 60 MG/0.6ML injection    LOVENOX    60 Syringe    Inject 0.6 mLs (60 mg) Subcutaneous every 12 hours for 30 days    Acute deep vein thrombosis (DVT) of right upper extremity, unspecified vein (H)       fluconazole 100 MG tablet    DIFLUCAN    30 tablet    Take 1 tablet (100 mg) by mouth daily    Diffuse large B-cell lymphoma, unspecified body region (H)       furosemide 20 MG tablet    LASIX    5 tablet    Take 1 tablet (20 mg) by mouth daily for 5 days    Other hypervolemia       gabapentin 100 MG capsule    NEURONTIN    120 capsule    Take 2 capsules (200 mg) by mouth 3 times daily    Critical illness myopathy       HYDROCORTISONE PO      Take 100 mg by mouth IV        leucovorin 15 MG Tabs     12 tablet    Take 1 tablet (15 mg) by mouth every 6 hours for 3 days    Diffuse large B-cell lymphoma of extranodal site (H)       lidocaine visc 2% 2.5mL/5mL & maalox/mylanta w/ simeth 2.5mL/5mL & diphenhydrAMINE 5mg/5mL Susp suspension    [facility]     Swish and swallow 10 mLs in mouth 2 times daily        multivitamin, therapeutic with minerals Tabs tablet     30 each    Take 1  tablet by mouth daily    Diffuse large B-cell lymphoma, unspecified body region (H)       ondansetron 8 MG ODT tab    ZOFRAN-ODT    20 tablet    Take 1 tablet (8 mg) by mouth every 8 hours as needed    Diffuse large B-cell lymphoma of extranodal site (H)       potassium & sodium phosphates 280-160-250 MG Packet    NEUTRA-PHOS    60 packet    Take 1 packet by mouth 2 times daily    Hypophosphatemia       potassium chloride SA 20 MEQ CR tablet    K-DUR/KLOR-CON M    60 tablet    Take 2 tablets (40 mEq) by mouth daily while taking furosemide (Lasix). You can decrease potassium to 20 mEq daily once off Lasix.    Diffuse large B-cell lymphoma, unspecified body region (H)       predniSONE 5 MG tablet    DELTASONE    30 tablet    Take 1 tablet (5 mg) by mouth daily    Diffuse large B-cell lymphoma, unspecified body region (H)       traMADol 50 MG tablet    ULTRAM    30 tablet    Take 1 tablet (50 mg) by mouth every 6 hours as needed for moderate pain    Diffuse large B-cell lymphoma of extranodal site (H)

## 2017-09-27 NOTE — MR AVS SNAPSHOT
After Visit Summary   9/27/2017    Amelia Michel    MRN: [id number]           Patient Information     Date Of Birth          1960        Visit Information        Provider Department      9/27/2017 11:00 AM  32 ATC;  ONCOLOGY INFUSION [facility]        Today's Diagnoses     Diffuse large B-cell lymphoma of extranodal site (H)    -  1      Care Instructions    HOLD LOVENOX UNTIL AFTER LAB AND INFUSION APPOINTMENT ON FRIDAY    Contact Numbers    [facility] Main Line: [phone]  [facility] Triage:  [phone]    Call triage with chills and/or temperature greater than or equal to 100.5, uncontrolled nausea/vomiting, diarrhea, constipation, dizziness, shortness of breath, chest pain, bleeding, unexplained bruising, or any new/concerning symptoms, questions/concerns.     If you are having any concerning symptoms or wish to speak to a provider before your next infusion visit, please call your care coordinator or triage to notify them so we can adequately serve you.       After Hours: [phone]    If after hours, weekends, or holidays, call main hospital  and ask for Oncology doctor on call.           September 2017 Sunday Monday Tuesday Wednesday Thursday Friday Saturday                            1     2     P [facility] LAB DRAW    8:00 AM   (15 min.)    [facility]     [facility] ONC INFUSION 120    8:30 AM   (120 min.)    ONCOLOGY INFUSION   [facility]   3     4     [facility] LAB DRAW    9:00 AM   (15 min.)    [facility] LAB DRAW   [facility] Lab Draw     UMP ONC INFUSION 120    9:30 AM   (120 min.)    ONCOLOGY INFUSION   [facility] 5     6     [facility] LAB DRAW    7:15 AM   (15 min.)    [facility]   [facility]     [facility] RETURN    7:45 AM   (50 min.)   Kalpana Alvarado PA-C   [facility] 7     Admission    9:01 AM   Nany Chadwick MD   Unit 7D [facility]  [place]   (Discharge: 9/11/2017)     IR PICC VASCULAR    2:15 PM   (60 min.)   UUVAS1   [facility], Vascular Access 8     9       10     CT CHEST PULMONARY EMBOLISM W   11:20 AM   (30 min.)   UUCT1   [facility], CT     US UPR EXT ALYSIA DUP RIGHT PORT    3:15 PM   (40 min.)   UUUS1   [facility], Ultrasound 11     ECH COMPLETE    9:35 AM   (60 min.)   UUECHIPR1   [facility],  Echocardiography 12     13     [facility] LAB DRAW   11:15 AM   (15 min.)   UC [facility] LAB DRAW   [facility] Masonic Lab Draw     UMP ONC RETURN   12:00 PM   (30 min.)   Lenore Rodriguez MD   [facility] Blood and Marrow Transplant 14     15     UMP NEW    9:45 AM   (60 min.)   Pj Cunha MD   [facility] Rheumatology 16       17     18     19     20     [facility] LAB DRAW    8:30 AM   (15 min.)    [facility] LAB DRAW   [facility] Masonic Lab Draw     UMP ONC INFUSION 360    9:00 AM   (360 min.)   UC ONCOLOGY INFUSION   [facility] 21     22     23       24     25     26     27     [facility] LAB DRAW   10:15 AM   (15 min.)   UC [facility] LAB DRAW   [facility] Mason[facility] Lab Draw     UMP ONC INFUSION 120   11:00 AM   (120 min.)    ONCOLOGY INFUSION   [facility]     UMP RETURN ARRHYTHMIA    1:15 PM   (30 min.)   Juan Eaton MD   [facility] Heart [facility] 28     29     UMP [facility] LAB DRAW   10:15 AM   (15 min.)   UC [facility] LAB DRAW   [facility] Mason[facility] Lab Draw     UMP ONC INFUSION 120   11:00 AM   (120 min.)   UC ONCOLOGY INFUSION   [facility] 30 October 2017 Sunday Monday Tuesday Wednesday Thursday Friday Saturday   1     2     3     4     5     6     7       8     9     PET ONCOLOGY WHOLE BODY    7:30 AM   (45 min.)   UUPET1   [facility] PET CT 10     11     [facility] LAB DRAW    6:30 AM   (15 min.)   UC [facility] LAB DRAW   [facility] Mason[facility] Lab Draw     UMP ONC INFUSION 360    7:00 AM   (360 min.)    ONCOLOGY INFUSION   [facility]      P ONC RETURN   11:30 AM   (30 min.)   Lenore Rodriguez MD   [facility] Blood and Marrow Transplant 12     13     14       15     16     17     18     19     20     21       22     23     24     25     26     27     28       29     30     P NEW    9:15 AM   (30 min.)   Archana Green PA-C   [facility] Urology and [facility] for Prostate and Urologic Cancers 31                                      Lab Results:  Recent Results (from the past 12 hour(s))   CBC with platelets differential    Collection Time: 09/27/17 10:30 AM   Result Value Ref Range    WBC 0.4 (LL) 4.0 - 11.0 10e9/L    RBC Count 2.48 (L) 3.8 - 5.2 10e12/L    Hemoglobin 9.4 (L) 11.7 - 15.7 g/dL    Hematocrit 28.1 (L) 35.0 - 47.0 %     (H) 78 - 100 fl    MCH 37.9 (H) 26.5 - 33.0 pg    MCHC 33.5 31.5 - 36.5 g/dL    RDW 22.9 (H) 10.0 - 15.0 %    Platelet Count 24 (LL) 150 - 450 10e9/L    Diff Method Manual Differential    ABO/Rh type and screen    Collection Time: 09/27/17 10:30 AM   Result Value Ref Range    Units Ordered 1     ABO O     RH(D) Neg     Antibody Screen Neg     Test Valid Only At          [facility]    Specimen Expires 09/30/2017     Crossmatch Red Blood Cells    Blood component    Collection Time: 09/27/17 10:30 AM   Result Value Ref Range    Unit Number [id number]     Blood Component Type Red Blood Cells Leukocyte Reduced     Division Number 00     Status of Unit Ready for patient 09/27/2017 1403     Blood Product Code [id number]     Unit Status ALEJANDRA    EKG 12-lead, tracing only (Future)    Collection Time: 09/27/17  1:33 PM   Result Value Ref Range    Interpretation ECG Click View Image link to view waveform and result    Platelet count    Collection Time: 09/27/17  1:50 PM   Result Value Ref Range    Platelet Count 52 (L) 150 - 450 10e9/L   Transfusion Rxn Blood Bank Notification    Collection Time: 09/27/17  2:22 PM   Result Value Ref Range    Transfusion Rxn Blood Bank  Notification Order received                Follow-ups after your visit        Your next 10 appointments already scheduled     Sep 29, 2017 10:15 AM CDT   [facility] Lab Draw with  [facility] LAB DRAW   [facility] Masonic Lab Draw ([facility])    [address] [place] [id number]   [phone]            Sep 29, 2017 11:00 AM CDT   Infusion 120 with UC ONCOLOGY INFUSION, UC 25 ATC   [facility] ([facility])    [address] [place] [address] [phone]            Oct 09, 2017  7:30 AM CDT   PET ONCOLOGY WHOLE BODY with UUPET1   [facility] PET CT ([facility])    [address] [address] [phone]           Tell your doctor:   If there is any chance you may be pregnant or if you are breastfeeding.   If you have problems lying in small spaces (claustrophobia). If you do, your doctor may give you medicine to help you relax. If you have diabetes:   Have your exam early in the morning. Your blood glucose will go up as the day goes by.   Your glucose level must be 180 or less at the start of the exam. Please take any medicines you need to ensure this blood glucose level. 24 hours before your scan: Don t do any heavy exercise. (No jogging, aerobics or other workouts.) Exercise will make your pictures less accurate. 6 hours before your scan:   Stop all food and liquids (except water).   Do not chew gum or suck on mints.   If you need to take medicine with food, you may take it with a few crackers.  Please call your Imaging Department at your exam site with any questions.            Oct 11, 2017  6:30 AM CDT   [facility] Lab Draw with  MASONIC LAB DRAW   [facility] Lab Draw ([facility])    [address] [place] [address] [phone]            Oct 11, 2017  7:00 AM CDT    Infusion 360 with  ONCOLOGY INFUSION,  12 ATC   [facility] ([facility])    [address]  2nd [address]  [facility] [address] [phone]            Oct 11, 2017 11:30 AM CDT   RETURN ONC with Lenore Rodriguez MD   [facility] Blood and Marrow Transplant ([facility])    [address]  [address]  [facility] [id number] [phone]            Oct 30, 2017  9:30 AM CDT   (Arrive by 9:15 AM)   New Patient Visit with Archana Green PA-C   [facility] Urology and Inst for Prostate and Urologic Cancers ([facility])    [address]  4th Floor  [facility] [address] [phone]            Nov 01, 2017  8:00 AM CDT   [facility] Lab Draw with [facility] LAB DRAW   [facility] Lab Draw ([facility])    [address]  [address] Floor  [facility] [id number] [phone]              Future tests that were ordered for you today     Open Standing Orders        Priority Remaining Interval Expires Ordered    Transfuse platelets unit Routine 99/100 TRANSFUSE 1 DOSE  9/27/2017    Red blood cell prepare order unit Routine 99/100 CONDITIONAL (SPECIFY) BLOOD  9/26/2017    Platelets prepare order unit Routine 99/100 CONDITIONAL (SPECIFY) BLOOD  9/26/2017            Who to contact     If you have questions or need follow up information about today's clinic visit or your schedule please contact [facility] CANCER [facility] directly at [phone].  Normal or non-critical lab and imaging results will be communicated to you by PlayGigahart, letter or phone within 4 business days after the clinic has received the results. If you do not hear from us within 7 days, please contact the clinic through MyChart or phone. If you have a critical or abnormal lab result, we will notify you by phone as soon as possible.  Submit refill requests through [employer] or call your pharmacy and  they will forward the refill request to us. Please allow 3 business days for your refill to be completed.          Additional Information About Your Visit        Babelversehart Information     [employer] gives you secure access to your electronic health record. If you see a primary care provider, you can also send messages to your care team and make appointments. If you have questions, please call your primary care clinic.  If you do not have a primary care provider, please call [phone] and they will assist you.        Care EveryWhere ID     This is your Care EveryWhere ID. This could be used by other organizations to access your [place] medical records  [id number]        Your Vitals Were     Pulse Temperature Respirations Pulse Oximetry BMI (Body Mass Index)       60 98.6  F (37  C) (Oral) 18 100% 25.71 kg/m2        Blood Pressure from Last 3 Encounters:   09/27/17 137/69   09/27/17 134/66   09/20/17 113/65    Weight from Last 3 Encounters:   09/27/17 54.4 kg (120 lb)   09/27/17 55.8 kg (123 lb)   09/20/17 54.3 kg (119 lb 9.6 oz)              We Performed the Following     ABO/Rh type and screen     Blood component     Blood component     Blood component     CBC with platelets differential     Platelet count     Platelets prepare order unit     Transfuse platelets unit     Transfusion reaction hives     Transfusion Rxn Blood Bank Notification        Primary Care Provider Office Phone # Fax #    Comfort Sabillon [phone] [address] [facility] [address]        Equal Access to Services     CATINA HEAD AH: Hadii laurita Flores, waaxda luqadaha, qaybta kaalmada catalino, durga inman. So [facility] [phone].    ATENCIÓN: Si habla español, tiene a sarmiento disposición servicios gratuitos de asistencia lingüística. Llame al [phone].    We comply with applicable federal civil rights laws and [place] laws. We do not discriminate on the basis of race, color,  national origin, age, disability sex, sexual orientation or gender identity.            Thank you!     Thank you for choosing [facility] CANCER CLINIC  for your care. Our goal is always to provide you with excellent care. Hearing back from our patients is one way we can continue to improve our services. Please take a few minutes to complete the written survey that you may receive in the mail after your visit with us. Thank you!             Your Updated Medication List - Protect others around you: Learn how to safely use, store and throw away your medicines at www.disposemymeds.org.          This list is accurate as of: 9/27/17  3:25 PM.  Always use your most recent med list.                   Brand Name Dispense Instructions for use Diagnosis    acetaminophen 325 MG tablet    TYLENOL     Take 2 tablets (650 mg) by mouth every 4 hours as needed for mild pain, fever or headaches    Diffuse large B-cell lymphoma of extranodal site (H)       acyclovir 400 MG tablet    ZOVIRAX    60 tablet    Take 1 tablet (400 mg) by mouth 2 times daily    Diffuse large B-cell lymphoma, unspecified body region (H)       ascorbic acid 500 MG Tabs     30 tablet    Take 1 tablet (500 mg) by mouth daily    Diffuse large B-cell lymphoma, unspecified body region (H)       atenolol 25 MG tablet    TENORMIN    60 tablet    Take 1 tablet (25 mg) by mouth 2 times daily    Atrial tachycardia (H)       BENADRYL ALLERGY PO      Take 50 mg by mouth        calcium polycarbophil 625 MG tablet    FIBERCON     Take 1 tablet by mouth 2 times daily        calcium-vitamin D 600-400 MG-UNIT per tablet    CALTRATE    60 tablet    Take 2 tablets by mouth every morning    Diffuse large B-cell lymphoma, unspecified body region (H)       digoxin 125 MCG tablet    LANOXIN    30 tablet    Take 1 tablet (125 mcg) by mouth daily    Atrial tachycardia (H)       enoxaparin 60 MG/0.6ML injection    LOVENOX    60 Syringe    Inject 0.6 mLs (60 mg) Subcutaneous every  12 hours for 30 days    Acute deep vein thrombosis (DVT) of right upper extremity, unspecified vein (H)       fluconazole 100 MG tablet    DIFLUCAN    30 tablet    Take 1 tablet (100 mg) by mouth daily    Diffuse large B-cell lymphoma, unspecified body region (H)       furosemide 20 MG tablet    LASIX    5 tablet    Take 1 tablet (20 mg) by mouth daily for 5 days    Other hypervolemia       gabapentin 100 MG capsule    NEURONTIN    120 capsule    Take 2 capsules (200 mg) by mouth 3 times daily    Critical illness myopathy       HYDROCORTISONE PO      Take 100 mg by mouth IV        leucovorin 15 MG Tabs     12 tablet    Take 1 tablet (15 mg) by mouth every 6 hours for 3 days    Diffuse large B-cell lymphoma of extranodal site (H)       lidocaine visc 2% 2.5mL/5mL & maalox/mylanta w/ simeth 2.5mL/5mL & diphenhydrAMINE 5mg/5mL Susp suspension    [facility] Mouthwash [facility]     Swish and swallow 10 mLs in mouth 2 times daily        multivitamin, therapeutic with minerals Tabs tablet     30 each    Take 1 tablet by mouth daily    Diffuse large B-cell lymphoma, unspecified body region (H)       ondansetron 8 MG ODT tab    ZOFRAN-ODT    20 tablet    Take 1 tablet (8 mg) by mouth every 8 hours as needed    Diffuse large B-cell lymphoma of extranodal site (H)       potassium & sodium phosphates 280-160-250 MG Packet    NEUTRA-PHOS    60 packet    Take 1 packet by mouth 2 times daily    Hypophosphatemia       potassium chloride SA 20 MEQ CR tablet    K-DUR/KLOR-CON M    60 tablet    Take 2 tablets (40 mEq) by mouth daily while taking furosemide (Lasix). You can decrease potassium to 20 mEq daily once off Lasix.    Diffuse large B-cell lymphoma, unspecified body region (H)       predniSONE 5 MG tablet    DELTASONE    30 tablet    Take 1 tablet (5 mg) by mouth daily    Diffuse large B-cell lymphoma, unspecified body region (H)       traMADol 50 MG tablet    ULTRAM    30 tablet    Take 1 tablet (50 mg) by mouth every 6 hours as  needed for moderate pain    Diffuse large B-cell lymphoma of extranodal site (H)

## 2017-09-28 LAB
BLD PROD TYP BPU: NORMAL
NUM BPU REQUESTED: 1

## 2017-09-28 RX ORDER — ACETAMINOPHEN 325 MG/1
325 TABLET ORAL ONCE
Status: CANCELLED
Start: 2017-09-28 | End: 2017-09-28

## 2017-09-28 RX ORDER — DIPHENHYDRAMINE HCL 25 MG
25 CAPSULE ORAL
Status: CANCELLED
Start: 2017-09-28

## 2017-09-29 ENCOUNTER — INFUSION THERAPY VISIT (OUTPATIENT)
Dept: ONCOLOGY | Facility: CLINIC | Age: 57
End: 2017-09-29
Attending: INTERNAL MEDICINE
Payer: COMMERCIAL

## 2017-09-29 ENCOUNTER — APPOINTMENT (OUTPATIENT)
Dept: LAB | Facility: CLINIC | Age: 57
End: 2017-09-29
Attending: INTERNAL MEDICINE
Payer: COMMERCIAL

## 2017-09-29 VITALS
TEMPERATURE: 98.7 F | BODY MASS INDEX: 26.06 KG/M2 | SYSTOLIC BLOOD PRESSURE: 131 MMHG | HEART RATE: 67 BPM | DIASTOLIC BLOOD PRESSURE: 69 MMHG | WEIGHT: 124.7 LBS | OXYGEN SATURATION: 99 %

## 2017-09-29 DIAGNOSIS — C83.398 DIFFUSE LARGE B-CELL LYMPHOMA OF EXTRANODAL SITE: ICD-10-CM

## 2017-09-29 DIAGNOSIS — C83.30 DLBCL (DIFFUSE LARGE B CELL LYMPHOMA) (H): Primary | ICD-10-CM

## 2017-09-29 LAB
ANISOCYTOSIS BLD QL SMEAR: ABNORMAL
BASOPHILS # BLD AUTO: 0.1 10E9/L (ref 0–0.2)
BASOPHILS NFR BLD AUTO: 2.7 %
DIFFERENTIAL METHOD BLD: ABNORMAL
EOSINOPHIL # BLD AUTO: 0 10E9/L (ref 0–0.7)
EOSINOPHIL NFR BLD AUTO: 0 %
ERYTHROCYTE [DISTWIDTH] IN BLOOD BY AUTOMATED COUNT: 23.1 % (ref 10–15)
HCT VFR BLD AUTO: 27.6 % (ref 35–47)
HGB BLD-MCNC: 9.2 G/DL (ref 11.7–15.7)
LYMPHOCYTES # BLD AUTO: 0.2 10E9/L (ref 0.8–5.3)
LYMPHOCYTES NFR BLD AUTO: 4.4 %
MACROCYTES BLD QL SMEAR: PRESENT
MCH RBC QN AUTO: 37.4 PG (ref 26.5–33)
MCHC RBC AUTO-ENTMCNC: 33.3 G/DL (ref 31.5–36.5)
MCV RBC AUTO: 112 FL (ref 78–100)
METAMYELOCYTES # BLD: 0 10E9/L
METAMYELOCYTES NFR BLD MANUAL: 0.9 %
MONOCYTES # BLD AUTO: 0.2 10E9/L (ref 0–1.3)
MONOCYTES NFR BLD AUTO: 4.4 %
MYELOCYTES # BLD: 0.1 10E9/L
MYELOCYTES NFR BLD MANUAL: 1.8 %
NEUTROPHILS # BLD AUTO: 3 10E9/L (ref 1.6–8.3)
NEUTROPHILS NFR BLD AUTO: 85.8 %
PLATELET # BLD AUTO: 33 10E9/L (ref 150–450)
RBC # BLD AUTO: 2.46 10E12/L (ref 3.8–5.2)
WBC # BLD AUTO: 3.5 10E9/L (ref 4–11)

## 2017-09-29 PROCEDURE — 36415 COLL VENOUS BLD VENIPUNCTURE: CPT

## 2017-09-29 PROCEDURE — 40000141 ZZH STATISTIC PERIPHERAL IV START W/O US GUIDANCE: Mod: ZF

## 2017-09-29 PROCEDURE — 36000 PLACE NEEDLE IN VEIN: CPT

## 2017-09-29 PROCEDURE — 85025 COMPLETE CBC W/AUTO DIFF WBC: CPT | Performed by: INTERNAL MEDICINE

## 2017-09-29 ASSESSMENT — PAIN SCALES - GENERAL: PAINLEVEL: MILD PAIN (3)

## 2017-09-29 NOTE — NURSING NOTE
Chief Complaint   Patient presents with     Blood Draw     labs drawn via venipuncture; PIV placed     /69 (BP Location: Right arm, Patient Position: Chair, Cuff Size: Adult Regular)  Pulse 67  Temp 98.7  F (37.1  C) (Oral)  Wt 56.6 kg (124 lb 11.2 oz)  SpO2 99%  BMI 26.06 kg/m2    PIV placed right antecub for infusion and labs. Pt tolerated well. Pt checked in for next appointment.    Kemi Guadarrama

## 2017-09-29 NOTE — PROGRESS NOTES
Infusion Nursing Note:  Amelia Michel presents today for possible platelets.    Patient seen by provider today: No   present during visit today: Not Applicable.    Note: Platelets 33, paged Dr. Rodriguez results    TORB from Dr. Rodriguez on 9/29/2017 at 1131:  -No platelets today  -Hold Lovenox until next labs  -Schedule a lab appointment and platelet transfusion appointment next Wednesday, 10/4/2017  -Page Dr. Rodriguez when platelets have resulted at next appointment    Intravenous Access:  Peripheral IV placed.    Treatment Conditions:  Lab Results   Component Value Date    HGB 9.2 09/29/2017     Lab Results   Component Value Date    WBC 3.5 09/29/2017      Lab Results   Component Value Date    ANEU 2.1 09/20/2017     Lab Results   Component Value Date    PLT 33 09/29/2017      Results reviewed, labs did NOT meet treatment parameters: .        Post Infusion Assessment:  Access discontinued per protocol.    Discharge Plan:   Patient declined prescription refills.  Discharge instructions reviewed with: Patient.  Patient and/or family verbalized understanding of discharge instructions and all questions answered.  Copy of AVS reviewed with patient and/or family.  Patient will return 10/4/17 for next appointment.  Patient discharged in stable condition accompanied by: .  Departure Mode: Ambulatory.    Niranjan Kaba RN

## 2017-09-29 NOTE — PROGRESS NOTES
Laboratory Medicine and Pathology  Transfusion Medicine- Transfusion Reaction    Amelia Michel MRN# [id number]   YOB: 1960 Age: 56 year old   Date of Reaction: 2017       Transfusion Reaction Evaluation   Impression  The patients symptoms are consistent with a non-severe allergic transfusion reaction.    Recommendation    Transfuse as needed.       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  She was seen in clinic on 2017 and received a unit of platelets for a platelet count of 24, beginning at 12:27.  The transfusion was complete at 13:26.  Toward the end of transfusion, the patient complained of itching and developed a hive.  She denied any dyspnea, chest pain, or back pain.  She received IV hydrocortisone and benadryl,which relieved her itching and reduced the size of the hive. She had mild hypertension that was present prior to the transfusion and her vital signs remained stable.      Reported Symptoms  Pruritis, 1 hive    Vital Signs (From EPIC Blood Administration Flowsheet)      Transfusion History: Multiple RBCs, platelets, plasma, and cryoglobulin since May 2017  Transfusion Reaction History: Febrile non-hemolytic reaction on 2017, hives with platelets noted in the allergy list  Type and Screen History: Negative antibody screens    Blood Bank Investigation  Product Type: Platelet  Unit Number: [id number]  Amount Remainin mL  Post-Transfusion Clerical Check: Correct  ABO/Rh: The unit type was O positive and the patient was O positive. The unit was compatible.  TODD: Not performed  Post-Transfusion Plasma: Not evaluated    [facility] Hemovigilance  Case Definition: Allergic reaction  Severity: Non-severe  Imputability: Definite       Candice Jordan MD  Laboratory Medicine and  Pathology Resident  Pager - [phone]  Phone - [phone]      ATTENDING ATTESTATION  I have personally reviewed the clinical and laboratory features of the reported transfusion reaction. I have discussed the case with the Pathology Resident , Dr. Candice Jordan. I agree with the comments in her note.     The patient experienced hives and itching consistent with a non-severe allergic reaction of definite imputability.  Recommend transfusing as needed. If the patient has recurrent or severe allergic reactions to blood components, consider premedicating with antihistamine and/or steroid.    Lorie Davis M.D., Ph.D.  Attending Physician  Division of Transfusion Medicine  Department of Laboratory Medicine [place] [address]  Pager [phone]

## 2017-09-29 NOTE — PATIENT INSTRUCTIONS
Hold Lovenox until after next lab appointment    Contact Numbers    [facility] [place]: [phone]  [facility] [place]  [phone]    Call triage with chills and/or temperature greater than or equal to 100.5, uncontrolled nausea/vomiting, diarrhea, constipation, dizziness, shortness of breath, chest pain, bleeding, unexplained bruising, or any new/concerning symptoms, questions/concerns.     If you are having any concerning symptoms or wish to speak to a provider before your next infusion visit, please call your care coordinator or triage to notify them so we can adequately serve you.       After Hours: [phone]    If after hours, weekends, or holidays, call main hospital  and ask for Oncology doctor on call.           September 2017 Sunday Monday Tuesday Wednesday Thursday Friday Saturday                            1     2     [facility] DRAW    8:00 AM   (15 min.)    MASONIC LAB DRAW   [facility] Draw     [facility] ONC INFUSION 120    8:30 AM   (120 min.)    ONCOLOGY INFUSION   [facility]   3     4     [facility] DRAW    9:00 AM   (15 min.)    MASON[facility] LAB DRAW   [facility] Lab Draw     UMP ONC INFUSION 120    9:30 AM   (120 min.)    ONCOLOGY INFUSION   [facility] 5     6     [facility] DRAW    7:15 AM   (15 min.)    [facility] LAB DRAW   [facility] Draw     [facility] RETURN    7:45 AM   (50 min.)   Kalpana Alvarado PA-C   [facility] 7     Admission    9:01 AM   Nany Chadwick MD   Unit 7D [facility]   (Discharge: 9/11/2017)     IR PICC VASCULAR    2:15 PM   (60 min.)   [id number] 8     9       10     CT CHEST PULMONARY EMBOLISM W   11:20 AM   (30 min.)   UUCT1   [facility]     US UPR EXT ALYSIA DUP RIGHT PORT    3:15 PM   (40 min.)   UUUS1   [facility]tras[facility] 11     ECH COMPLETE    9:35 AM   (60 min.)   UUECHIPR1   [facility]  Echocardiography 12     13      [facility] DRAW   11:15 AM   (15 min.)   [facility] D[facility]   [facility] Lab Draw     UMP ONC RETURN   12:00 PM   (30 min.)   Lenore Rodriguez MD   [facility] Blood and Marrow Transplant 14     15     UMP NEW    9:45 AM   (60 min.)   Pj Cunha MD   [facility] Rheumatology 16       17     18     19     20     [facility] DRAW    8:30 AM   (15 min.)   [facility] DRAW   [facility] Lab Draw     UMP ONC INFUSION 360    9:00 AM   (360 min.)   UC ONCOLOGY INFUSION   [facility] 21     22     23       24     25     26     27     [facility] DRAW   10:15 AM   (15 min.)    MASONIC LAB DRAW   [facility] Lab Draw     UMP ONC INFUSION 120   11:00 AM   (120 min.)    ONCOLOGY INFUSION   [facility]     UMP RETURN ARRHYTHMIA    1:15 PM   (30 min.)   Juan Eaton MD   [facility] 28     29     [facility] DRAW   10:15 AM   (15 min.)    MASONIC LAB DRAW   [facility] Lab Draw     UMP ONC INFUSION 120   11:00 AM   (120 min.)   UC ONCOLOGY INFUSION   [facility] 30 October 2017 Sunday Monday Tuesday Wednesday Thursday Friday Saturday   1     2     3     4     [facility]   12:00 PM   (15 min.)    [facility]RAW   [facility]     [facility] ONC INFUSION 120   12:30 PM   (120 min.)    ONCOLOGY INFUSION   [facility] 5     6     7       8     9     PET ONCOLOGY WHOLE BODY    7:30 AM   (45 min.)   UUPET1   [facility] [place] PET CT 10     11     [facility] DRAW    6:30 AM   (15 min.)    MASONIC LAB DRAW   [facility] Lab Draw     [facility] ONC INFUSION 360    7:00 AM   (360 min.)   UC ONCOLOGY INFUSION   [facility]     UMP ONC RETURN   11:30 AM   (30 min.)   Lenore Rodriguez MD   [facility] Blood and Marrow Transplant 12     13     14       15     16     17     18     19     20     21       22     23     24     25     26     27      28       29     30     UMP NEW    9:15 AM   (30 min.)   Archana Green PA-C   [facility] Urology and [facility] for Prostate and Urologic Cancers 31                                      Lab Results:  Recent Results (from the past 12 hour(s))   Platelets prepare order unit    Collection Time: 09/29/17  1:00 AM   Result Value Ref Range    Blood Component Type PLT Pheresis     Units Ordered 1    CBC with platelets differential    Collection Time: 09/29/17 11:05 AM   Result Value Ref Range    WBC 3.5 (L) 4.0 - 11.0 10e9/L    RBC Count 2.46 (L) 3.8 - 5.2 10e12/L    Hemoglobin 9.2 (L) 11.7 - 15.7 g/dL    Hematocrit 27.6 (L) 35.0 - 47.0 %     (H) 78 - 100 fl    MCH 37.4 (H) 26.5 - 33.0 pg    MCHC 33.3 31.5 - 36.5 g/dL    RDW 23.1 (H) 10.0 - 15.0 %    Platelet Count 33 (LL) 150 - 450 10e9/L    Diff Method PENDING

## 2017-09-29 NOTE — MR AVS SNAPSHOT
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     If you are having any concerning symptoms or wish to speak to a provider before your next infusion visit, please call your care coordinator or triage to notify them so we can adequately serve you.       After Hours: [phone]    If after hours, weekends, or holidays, call main hospital  and ask for Oncology doctor on call.           September 2017 Sunday Monday Tuesday Wednesday Thursday Friday Saturday                            1     2     [facility] D[facility]    8:00 AM   (15 min.)    [facility]     [facility] ONC INFUSION 120    8:30 AM   (120 min.)    ONCOLOGY INFUSION   [facility]   3     4     [facility] DRAW    9:00 AM   (15 min.)    MASONIC LAB DRAW   [facility] Lab Draw     UMP ONC INFUSION 120    9:30 AM   (120 min.)    ONCOLOGY INFUSION   [facility] 5     6     [facility] DRAW    7:15 AM   (15 min.)    M[facility]IC LAB DRAW   [facility] Lab Draw     UMP RETURN    7:45 AM   (50 min.)   Kalpana Alvarado PA-C   [facility] 7     Admission    9:01 AM    Nany Chadwick MD   Unit 7D [facility]   (Discharge: 9/11/2017)     IR PICC VASCULAR    2:15 PM   (60 min.)   UUVAS1   [facility], Vascular Access 8     9       10     CT CHEST PULMONARY EMBOLISM W   11:20 AM   (30 min.)   UUCT1   [facility], CT     US UPR EXT ALYSIA DUP RIGHT PORT    3:15 PM   (40 min.)   UUUS1   [facility], Ultrasound 11     ECH COMPLETE    9:35 AM   (60 min.)   UUECHIPR1   [facility],  Echocardiography 12     13     [facility] DRAW   11:15 AM   (15 min.)   [facility] LAB DRAW   [facility] Masonic Lab Draw     UMP ONC RETURN   12:00 PM   (30 min.)   Lenore Rodriguez MD   [facility] Blood and Marrow Transplant 14     15     UMP NEW    9:45 AM   (60 min.)   Pj Cunha MD   [facility] Rheumatology 16       17     18     19     20     [facility] DRAW    8:30 AM   (15 min.)   [facility] DRAW   [facility] Mason[facility] Lab Draw     UMP ONC INFUSION 360    9:00 AM   (360 min.)   UC ONCOLOGY INFUSION   [facility] 21     22     23       24     25     26     27     [facility] DRAW   10:15 AM   (15 min.)   [facility] LAB DRAW   [facility] Mason[facility] Lab Draw     UMP ONC INFUSION 120   11:00 AM   (120 min.)   UC ONCOLOGY INFUSION   [facility]     UMP RETURN ARRHYTHMIA    1:15 PM   (30 min.)   Juan Eaton MD   [facility] Heart [facility] 28     29     [facility] DRAW   10:15 AM   (15 min.)   [facility] DRAW   [facility] Masonic Lab Draw     UMP ONC INFUSION 120   11:00 AM   (120 min.)   UC ONCOLOGY INFUSION   [facility] 30 October 2017 Sunday Monday Tuesday Wednesday Thursday Friday Saturday   1     2     3     4     [facility] DRAW   12:00 PM   (15 min.)   [facility] DRAW   [facility] Lab Draw     UMP ONC INFUSION 120   12:30 PM   (120 min.)   UC ONCOLOGY INFUSION   [facility] 5     6     7       8     9     PET ONCOLOGY WHOLE BODY    7:30 AM   (45 min.)   UUPET1   [facility]  [place] PET [place] 10     11     [facility] MASONIC LAB DRAW    6:30 AM   (15 min.)    MASONIC LAB DRAW   [facility] Lab Draw     [facility] ONC INFUSION 360    7:00 AM   (360 min.)   UC ONCOLOGY INFUSION   [facility] ONC RETURN   11:30 AM   (30 min.)   Lenore Rodriguez MD   [facility] Blood and Marrow Transplant 12     13     14       15     16     17     18     19     20     21       22     23     24     25     26     27     28       29     30     [facility] NEW    9:15 AM   (30 min.)   Archana Green PA-C   [facility] Urology and [facility] for Prostate and Urologic Cancers 31                                      Lab Results:  Recent Results (from the past 12 hour(s))   Platelets prepare order unit    Collection Time: 09/29/17  1:00 AM   Result Value Ref Range    Blood Component Type PLT Pheresis     Units Ordered 1    CBC with platelets differential    Collection Time: 09/29/17 11:05 AM   Result Value Ref Range    WBC 3.5 (L) 4.0 - 11.0 10e9/L    RBC Count 2.46 (L) 3.8 - 5.2 10e12/L    Hemoglobin 9.2 (L) 11.7 - 15.7 g/dL    Hematocrit 27.6 (L) 35.0 - 47.0 %     (H) 78 - 100 fl    MCH 37.4 (H) 26.5 - 33.0 pg    MCHC 33.3 31.5 - 36.5 g/dL    RDW 23.1 (H) 10.0 - 15.0 %    Platelet Count 33 (LL) 150 - 450 10e9/L    Diff Method PENDING                Follow-ups after your visit        Your next 10 appointments already scheduled     Oct 04, 2017 12:00 PM CDT   [facility]ic Lab Draw with  MASONIC LAB DRAW   [facility] Lab Draw ([facility])    [address]  2nd Floor  [facility] [id number] [phone]            Oct 04, 2017 12:30 PM CDT   Infusion 120 with UC ONCOLOGY INFUSION, UC 21 ATC   [facility] ([facility])    [address]  2nd [facility] [address]   [phone]            Oct 09, 2017  7:30 AM CDT   PET ONCOLOGY WHOLE BODY with UUPET1   [facility] PET CT ([facility])    [address] [address] [phone]           Tell your doctor:   If there is any chance you may be pregnant or if you are breastfeeding.   If you have problems lying in small spaces (claustrophobia). If you do, your doctor may give you medicine to help you relax. If you have diabetes:   Have your exam early in the morning. Your blood glucose will go up as the day goes by.   Your glucose level must be 180 or less at the start of the exam. Please take any medicines you need to ensure this blood glucose level. 24 hours before your scan: Don t do any heavy exercise. (No jogging, aerobics or other workouts.) Exercise will make your pictures less accurate. 6 hours before your scan:   Stop all food and liquids (except water).   Do not chew gum or suck on mints.   If you need to take medicine with food, you may take it with a few crackers.  Please call your Imaging Department at your exam site with any questions.            Oct 11, 2017  6:30 AM CDT   Masonic Lab Draw with  M[facility]IC LAB DRAW   [facility]    [address] [place] [id number] [phone]            Oct 11, 2017  7:00 AM CDT   Infusion 360 with  ONCOLOGY INFUSION,  12 ATC   [facility] ([facility])    [address] [place] [id number] [phone]            Oct 11, 2017 11:30 AM CDT   RETURN ONC with Lenore Rodriguez MD   [facility] Blood and Marrow Transplant ([facility])    [address] [place] [address]   [phone]            Oct 30, 2017  9:30 AM CDT   (Arrive by 9:15 AM)   New Patient Visit with Archana Green PA-C   [facility] Urology and Inst for Prostate and Urologic Cancers ([facility])    [address] [place] [address]    [phone]            Nov 01, 2017  8:00 AM CDT   Masonic Lab Draw with  MASONIC LAB DRAW   [facility] Lab Draw ([facility] [place])    [address]  2nd Floor  [facility] [address] [phone]              Future tests that were ordered for you today     Open Standing Orders        Priority Remaining Interval Expires Ordered    Platelets prepare order unit Routine 99/100 CONDITIONAL (SPECIFY) BLOOD  9/28/2017            Who to contact     If you have questions or need follow up information about today's clinic visit or your schedule please contact [facility] directly at [phone].  Normal or non-critical lab and imaging results will be communicated to you by [employer], letter or phone within 4 business days after the clinic has received the results. If you do not hear from us within 7 days, please contact the clinic through [employer] or phone. If you have a critical or abnormal lab result, we will notify you by phone as soon as possible.  Submit refill requests through Yelp or call your pharmacy and they will forward the refill request to us. Please allow 3 business days for your refill to be completed.          Additional Information About Your Visit        Yelp Information     Yelp gives you secure access to your electronic health record. If you see a primary care provider, you can also send messages to your care team and make appointments. If you have questions, please call your primary care clinic.  If you do not have a primary care provider, please call [phone] and they will assist you.        Care EveryWhere ID     This is your Care EveryWhere ID. This could be used by other organizations to access your [place] medical records  [id number]        Your Vitals Were     Pulse Temperature Pulse Oximetry BMI (Body Mass Index)          67 98.7  F (37.1  C) (Oral) 99% 26.06 kg/m2         Blood Pressure from Last 3 Encounters:   09/29/17  131/69   09/27/17 137/69   09/27/17 134/66    Weight from Last 3 Encounters:   09/29/17 56.6 kg (124 lb 11.2 oz)   09/27/17 54.4 kg (120 lb)   09/27/17 55.8 kg (123 lb)              We Performed the Following     CBC with platelets differential     Platelets prepare order unit        Primary Care Provider Office Phone # Fax #    Comfort Sabillon [phone] [address]  [place] [address]        Equal Access to Services     [facility] Center[facility] : Hadii aad ku hadasho Soomaali, waaxda luqadaha, qaybta kaalmada adeegyada, durga hermosillo . So [facility] [phone].    ATENCIÓN: Si habla español, tiene a sarmiento disposición servicios gratuitos de asistencia lingüística. [facility] [phone].    We comply with applicable federal civil rights laws and [place] laws. We do not discriminate on the basis of race, color, national origin, age, disability sex, sexual orientation or gender identity.            Thank you!     Thank you for choosing [facility]  for your care. Our goal is always to provide you with excellent care. Hearing back from our patients is one way we can continue to improve our services. Please take a few minutes to complete the written survey that you may receive in the mail after your visit with us. Thank you!             Your Updated Medication List - Protect others around you: Learn how to safely use, store and throw away your medicines at www.disposemymeds.org.          This list is accurate as of: 9/29/17 11:49 AM.  Always use your most recent med list.                   Brand Name Dispense Instructions for use Diagnosis    acetaminophen 325 MG tablet    TYLENOL     Take 2 tablets (650 mg) by mouth every 4 hours as needed for mild pain, fever or headaches    Diffuse large B-cell lymphoma of extranodal site (H)       acyclovir 400 MG tablet    ZOVIRAX    60 tablet    Take 1 tablet (400 mg) by mouth 2 times daily    Diffuse large B-cell  lymphoma, unspecified body region (H)       ascorbic acid 500 MG Tabs     30 tablet    Take 1 tablet (500 mg) by mouth daily    Diffuse large B-cell lymphoma, unspecified body region (H)       atenolol 25 MG tablet    TENORMIN    60 tablet    Take 1 tablet (25 mg) by mouth 2 times daily    Atrial tachycardia (H)       BENADRYL ALLERGY PO      Take 50 mg by mouth        calcium polycarbophil 625 MG tablet    FIBERCON     Take 1 tablet by mouth 2 times daily        calcium-vitamin D 600-400 MG-UNIT per tablet    CALTRATE    60 tablet    Take 2 tablets by mouth every morning    Diffuse large B-cell lymphoma, unspecified body region (H)       digoxin 125 MCG tablet    LANOXIN    30 tablet    Take 1 tablet (125 mcg) by mouth daily    Atrial tachycardia (H)       enoxaparin 60 MG/0.6ML injection    LOVENOX    60 Syringe    Inject 0.6 mLs (60 mg) Subcutaneous every 12 hours for 30 days    Acute deep vein thrombosis (DVT) of right upper extremity, unspecified vein (H)       fluconazole 100 MG tablet    DIFLUCAN    30 tablet    Take 1 tablet (100 mg) by mouth daily    Diffuse large B-cell lymphoma, unspecified body region (H)       furosemide 20 MG tablet    LASIX    5 tablet    Take 1 tablet (20 mg) by mouth daily for 5 days    Other hypervolemia       gabapentin 100 MG capsule    NEURONTIN    120 capsule    Take 2 capsules (200 mg) by mouth 3 times daily    Critical illness myopathy       HYDROCORTISONE PO      Take 100 mg by mouth IV        leucovorin 15 MG Tabs     12 tablet    Take 1 tablet (15 mg) by mouth every 6 hours for 3 days    Diffuse large B-cell lymphoma of extranodal site (H)       lidocaine visc 2% 2.5mL/5mL & maalox/mylanta w/ simeth 2.5mL/5mL & diphenhydrAMINE 5mg/5mL Susp suspension    [facility]     Swish and swallow 10 mLs in mouth 2 times daily        multivitamin, therapeutic with minerals Tabs tablet     30 each    Take 1 tablet by mouth daily    Diffuse large B-cell lymphoma,  unspecified body region (H)       ondansetron 8 MG ODT tab    ZOFRAN-ODT    20 tablet    Take 1 tablet (8 mg) by mouth every 8 hours as needed    Diffuse large B-cell lymphoma of extranodal site (H)       potassium & sodium phosphates 280-160-250 MG Packet    NEUTRA-PHOS    60 packet    Take 1 packet by mouth 2 times daily    Hypophosphatemia       potassium chloride SA 20 MEQ CR tablet    K-DUR/KLOR-CON M    60 tablet    Take 2 tablets (40 mEq) by mouth daily while taking furosemide (Lasix). You can decrease potassium to 20 mEq daily once off Lasix.    Diffuse large B-cell lymphoma, unspecified body region (H)       predniSONE 5 MG tablet    DELTASONE    30 tablet    Take 1 tablet (5 mg) by mouth daily    Diffuse large B-cell lymphoma, unspecified body region (H)       traMADol 50 MG tablet    ULTRAM    30 tablet    Take 1 tablet (50 mg) by mouth every 6 hours as needed for moderate pain    Diffuse large B-cell lymphoma of extranodal site (H)

## 2017-10-01 LAB — INTERPRETATION ECG - MUSE: NORMAL

## 2017-10-03 PROCEDURE — G0180 MD CERTIFICATION HHA PATIENT: HCPCS | Performed by: FAMILY MEDICINE

## 2017-10-03 RX ORDER — DIPHENHYDRAMINE HCL 25 MG
25 CAPSULE ORAL
Status: CANCELLED
Start: 2017-10-04

## 2017-10-03 RX ORDER — ACETAMINOPHEN 325 MG/1
325 TABLET ORAL ONCE
Status: CANCELLED
Start: 2017-10-04 | End: 2017-10-04

## 2017-10-04 ENCOUNTER — INFUSION THERAPY VISIT (OUTPATIENT)
Dept: ONCOLOGY | Facility: CLINIC | Age: 57
End: 2017-10-04
Attending: INTERNAL MEDICINE
Payer: COMMERCIAL

## 2017-10-04 VITALS
WEIGHT: 123.6 LBS | BODY MASS INDEX: 25.83 KG/M2 | HEART RATE: 74 BPM | OXYGEN SATURATION: 100 % | RESPIRATION RATE: 16 BRPM | SYSTOLIC BLOOD PRESSURE: 134 MMHG | TEMPERATURE: 97.8 F | DIASTOLIC BLOOD PRESSURE: 53 MMHG

## 2017-10-04 DIAGNOSIS — C83.398 DIFFUSE LARGE B-CELL LYMPHOMA OF EXTRANODAL SITE: Primary | ICD-10-CM

## 2017-10-04 LAB
BASOPHILS # BLD AUTO: 0 10E9/L (ref 0–0.2)
BASOPHILS NFR BLD AUTO: 0.4 %
BLD PROD TYP BPU: NORMAL
BLD UNIT ID BPU: 0
BLOOD PRODUCT CODE: NORMAL
BPU ID: NORMAL
DIFFERENTIAL METHOD BLD: ABNORMAL
EOSINOPHIL # BLD AUTO: 0 10E9/L (ref 0–0.7)
EOSINOPHIL NFR BLD AUTO: 0 %
ERYTHROCYTE [DISTWIDTH] IN BLOOD BY AUTOMATED COUNT: 23.3 % (ref 10–15)
HCT VFR BLD AUTO: 27.4 % (ref 35–47)
HGB BLD-MCNC: 9.2 G/DL (ref 11.7–15.7)
IMM GRANULOCYTES # BLD: 0.1 10E9/L (ref 0–0.4)
IMM GRANULOCYTES NFR BLD: 1.9 %
LYMPHOCYTES # BLD AUTO: 0.4 10E9/L (ref 0.8–5.3)
LYMPHOCYTES NFR BLD AUTO: 6.9 %
MCH RBC QN AUTO: 38.2 PG (ref 26.5–33)
MCHC RBC AUTO-ENTMCNC: 33.6 G/DL (ref 31.5–36.5)
MCV RBC AUTO: 114 FL (ref 78–100)
MONOCYTES # BLD AUTO: 0.7 10E9/L (ref 0–1.3)
MONOCYTES NFR BLD AUTO: 13 %
NEUTROPHILS # BLD AUTO: 4.1 10E9/L (ref 1.6–8.3)
NEUTROPHILS NFR BLD AUTO: 77.8 %
NRBC # BLD AUTO: 0 10*3/UL
NRBC BLD AUTO-RTO: 0 /100
PLATELET # BLD AUTO: 64 10E9/L (ref 150–450)
RBC # BLD AUTO: 2.41 10E12/L (ref 3.8–5.2)
TRANSFUSION STATUS PATIENT QL: NORMAL
TRANSFUSION STATUS PATIENT QL: NORMAL
WBC # BLD AUTO: 5.3 10E9/L (ref 4–11)

## 2017-10-04 PROCEDURE — 36415 COLL VENOUS BLD VENIPUNCTURE: CPT

## 2017-10-04 PROCEDURE — 85025 COMPLETE CBC W/AUTO DIFF WBC: CPT | Performed by: INTERNAL MEDICINE

## 2017-10-04 ASSESSMENT — PAIN SCALES - GENERAL: PAINLEVEL: NO PAIN (0)

## 2017-10-04 NOTE — PROGRESS NOTES
Infusion Nursing Note:  Amelia Michel presents for possible platelets    Note: pt feeling well today, no new issues or concerns to report. Platelets noted to be 64 and pt denies any bleeding, no transfusions needed.    MARY GRACEB- Dr. Rodriguez/Marci Westbrook RN  --instruct pt to restart lovenox    Treatment Conditions:  Lab Results   Component Value Date    HGB 9.2 10/04/2017     Lab Results   Component Value Date    WBC 5.3 10/04/2017      Lab Results   Component Value Date    ANEU 4.1 10/04/2017     Lab Results   Component Value Date    PLT 64 10/04/2017      Lab Results   Component Value Date     09/20/2017                   Lab Results   Component Value Date    POTASSIUM 4.0 09/20/2017           Lab Results   Component Value Date    MAG 1.7 09/11/2017            Lab Results   Component Value Date    CR 0.60 09/20/2017                   Lab Results   Component Value Date    VIKTORIYA 9.3 09/20/2017                Lab Results   Component Value Date    BILITOTAL 0.5 09/20/2017           Lab Results   Component Value Date    ALBUMIN 3.5 09/20/2017                    Lab Results   Component Value Date    ALT 70 09/20/2017           Lab Results   Component Value Date    AST 44 09/20/2017     Results reviewed, labs did NOT meet treatment parameters: no transfusions needed.    Intravenous Access:  Peripheral IV placed.    Post Infusion Assessment:  Access discontinued per protocol.    Discharge Plan:   Patient declined prescription refills.  Discharge instructions reviewed with: Patient and Family.  Patient and/or family verbalized understanding of discharge instructions and all questions answered.  AVS to patient via REPPT.  Patient will return 10/11 for next appointment.   Patient discharged in stable condition accompanied by: self and .    Marci Westbrook, ABBEY

## 2017-10-04 NOTE — MR AVS SNAPSHOT
After Visit Summary   10/4/2017    Amelia Michel    MRN: [id number]           Patient Information     Date Of Birth          1960        Visit Information        Provider Department      10/4/2017 12:30 PM UC 21 ATC; UC ONCOLOGY INFUSION [facility]        Today's Diagnoses     Diffuse large B-cell lymphoma of extranodal site (H)    -  1      Care Instructions    Contact numbers:  Triage Main Line: [phone]  After hours: [phone]    Call with chills and/or temperature greater than or equal to 100.5 and questions or concerns.    If after hours, weekends, or holidays, call main hospital  at  [phone] and ask for Oncology doctor on call.           October 2017 Sunday Monday Tuesday Wednesday Thursday Friday Saturday   1     2     3     4     P [facility] LAB DRAW   12:00 PM   (15 min.)    [facility] LAB DRAW   [facility] Lab Draw     P ONC INFUSION 120   12:30 PM   (120 min.)    ONCOLOGY INFUSION   [facility] 5     6     7       8     9     PET ONCOLOGY WHOLE BODY    7:30 AM   (45 min.)   UUPET1   [facility] [place] PET CT 10     11     P [facility] LAB DRAW    6:30 AM   (15 min.)    [facility] LAB DRAW   [facility] Lab Draw     [facility] ONC INFUSION 360    7:00 AM   (360 min.)    ONCOLOGY INFUSION   [facility]   11:30 AM   (30 min.)   Lenore Rodriguez MD   [facility] Blood and Marrow Transplant 12     13     14       15     16     17     18     19     20     21       22     23     24     25     26     27     28       29     30     [facility] NEW    9:15 AM   (30 min.)   Archana Green PA-C   [facility] Urology and Inst for Prostate and Urologic Cancers 31 November 2017 Sunday Monday Tuesday Wednesday Thursday Friday Saturday                  1     P [facility] DRAW    8:00 AM   (15 min.)    [facility] LAB DRAW   [facility] Lab Draw     P ONC  INFUSION 360    8:30 AM   (360 min.)    ONCOLOGY INFUSION   [facility] 2     3     4       5     6     7     8     9     10     11       12     13     14     15     16     17     18       19     20     21     22     [facility] LAB DRAW    8:00 AM   (15 min.)    [facility] LAB DRAW   [facility] Lab Draw     [facility] ONC INFUSION 360    8:30 AM   (360 min.)    ONCOLOGY INFUSION   [facility] 23     24     25  Happy Birthday!       26     27     28     29     30                            Lab Results:  Recent Results (from the past 12 hour(s))   Blood component    Collection Time: 10/04/17  1:00 AM   Result Value Ref Range    Unit Number [id number]     Blood Component Type PlateletPheresis,LeukoRed Irrad (Part 3)     Division Number 00     Status of Unit Ready for patient 10/04/2017 0724     Blood Product Code G7911I60     Unit Status ALEJANDRA    CBC with platelets differential    Collection Time: 10/04/17 12:09 PM   Result Value Ref Range    WBC 5.3 4.0 - 11.0 10e9/L    RBC Count 2.41 (L) 3.8 - 5.2 10e12/L    Hemoglobin 9.2 (L) 11.7 - 15.7 g/dL    Hematocrit 27.4 (L) 35.0 - 47.0 %     (H) 78 - 100 fl    MCH 38.2 (H) 26.5 - 33.0 pg    MCHC 33.6 31.5 - 36.5 g/dL    RDW 23.3 (H) 10.0 - 15.0 %    Platelet Count 64 (L) 150 - 450 10e9/L    Diff Method Automated Method     % Neutrophils 77.8 %    % Lymphocytes 6.9 %    % Monocytes 13.0 %    % Eosinophils 0.0 %    % Basophils 0.4 %    % Immature Granulocytes 1.9 %    Nucleated RBCs 0 0 /100    Absolute Neutrophil 4.1 1.6 - 8.3 10e9/L    Absolute Lymphocytes 0.4 (L) 0.8 - 5.3 10e9/L    Absolute Monocytes 0.7 0.0 - 1.3 10e9/L    Absolute Eosinophils 0.0 0.0 - 0.7 10e9/L    Absolute Basophils 0.0 0.0 - 0.2 10e9/L    Abs Immature Granulocytes 0.1 0 - 0.4 10e9/L    Absolute Nucleated RBC 0.0                Follow-ups after your visit        Your next 10 appointments already scheduled     Oct 09, 2017  7:30 AM CDT   PET ONCOLOGY  WHOLE BODY with UUPET1   [facility] [place] PET CT ([facility] [place])    [address] [facility] [address] [id number] [phone]           Tell your doctor:   If there is any chance you may be pregnant or if you are breastfeeding.   If you have problems lying in small spaces (claustrophobia). If you do, your doctor may give you medicine to help you relax. If you have diabetes:   Have your exam early in the morning. Your blood glucose will go up as the day goes by.   Your glucose level must be 180 or less at the start of the exam. Please take any medicines you need to ensure this blood glucose level. 24 hours before your scan: Don t do any heavy exercise. (No jogging, aerobics or other workouts.) Exercise will make your pictures less accurate. 6 hours before your scan:   Stop all food and liquids (except water).   Do not chew gum or suck on mints.   If you need to take medicine with food, you may take it with a few crackers.  Please call your Imaging Department at your exam site with any questions.            Oct 11, 2017  6:30 AM CDT   [facility] Lab Draw with  [facility] LAB DRAW   [facility] Lab Draw ([facility])    [address] [address] [phone]            Oct 11, 2017  7:00 AM CDT   Infusion 360 with  ONCOLOGY INFUSION,  12 ATC   [facility] ([facility])    [address] [address] [phone]            Oct 11, 2017 11:30 AM CDT   RETURN ONC with Lenore Rodriguez MD   [facility] Blood and Marrow Transplant ([facility])    [address] [address]   [phone]            Oct 30, 2017  9:30 AM CDT   (Arrive by 9:15 AM)   New Patient Visit with Archana Green PA-C   [facility] Urology and Inst for Prostate and Urologic Cancers ([facility]  [place])    [address] [place] [address]  4th [facility] [address]   [phone]            Nov 01, 2017  8:00 AM CDT   [facility] Lab Draw with  MASON[facility] LAB DRAW   [facility] Lab Draw ([facility])    [address] [place] [address]  2nd [facility] [address]   [phone]            Nov 01, 2017  8:30 AM CDT   Infusion 360 with  ONCOLOGY INFUSION, UC 19 ATC   [facility] ([facility])    [address] [place] [address]   [phone]            Nov 22, 2017  8:00 AM CST   [facility] Lab Draw with  M[facility] LAB DRAW   [facility] Lab Draw ([facility])    [address] [place] [address]   [phone]              Future tests that were ordered for you today     Open Standing Orders        Priority Remaining Interval Expires Ordered    Red blood cell prepare order unit Routine 99/100 CONDITIONAL (SPECIFY) BLOOD  10/3/2017    Platelets prepare order unit Routine 99/100 CONDITIONAL (SPECIFY) BLOOD  10/3/2017            Who to contact     If you have questions or need follow up information about today's clinic visit or your schedule please contact [facility] directly at [phone].  Normal or non-critical lab and imaging results will be communicated to you by Chelailehart, letter or phone within 4 business days after the clinic has received the results. If you do not hear from us within 7 days, please contact the clinic through Chelailehart or phone. If you have a critical or abnormal lab result, we will notify you by phone as soon as possible.  Submit refill requests through Scodix or call your pharmacy and they will forward the refill request to us. Please allow 3 business days for your refill to be completed.          Additional Information About Your Visit        Scodix Information     Scodix gives you secure access to your electronic health  record. If you see a primary care provider, you can also send messages to your care team and make appointments. If you have questions, please call your primary care clinic.  If you do not have a primary care provider, please call [phone] and they will assist you.        Care EveryWhere ID     This is your Care EveryWhere ID. This could be used by other organizations to access your [facility] medical records  [id number]        Your Vitals Were     Pulse Temperature Respirations Pulse Oximetry BMI (Body Mass Index)       74 97.8  F (36.6  C) 16 100% 25.83 kg/m2        Blood Pressure from Last 3 Encounters:   10/04/17 134/53   09/29/17 131/69   09/27/17 137/69    Weight from Last 3 Encounters:   10/04/17 56.1 kg (123 lb 9.6 oz)   09/29/17 56.6 kg (124 lb 11.2 oz)   09/27/17 54.4 kg (120 lb)              We Performed the Following     Blood component     CBC with platelets differential     Platelets prepare order unit        Primary Care Provider Office Phone # Fax #    Comfort Sabillon [phone] [address] [facility] [address]        Equal Access to Services     SARAH HEAD AH: Hadii laurita rodgerso Sogilbert, waaxda luqadaha, qaybta kaalmada adejayada, durga inman. So [facility] [phone].    ATENCIÓN: Si habla español, tiene a sarmiento disposición servicios gratuitos de asistencia lingüística. [facility] [phone].    We comply with applicable federal civil rights laws and [place] laws. We do not discriminate on the basis of race, color, national origin, age, disability, sex, sexual orientation, or gender identity.            Thank you!     Thank you for choosing [facility] CANCER [facility]  for your care. Our goal is always to provide you with excellent care. Hearing back from our patients is one way we can continue to improve our services. Please take a few minutes to complete the written survey that you may receive in the mail after your visit with us.  Thank you!             Your Updated Medication List - Protect others around you: Learn how to safely use, store and throw away your medicines at www.disposemymeds.org.          This list is accurate as of: 10/4/17 12:48 PM.  Always use your most recent med list.                   Brand Name Dispense Instructions for use Diagnosis    acetaminophen 325 MG tablet    TYLENOL     Take 2 tablets (650 mg) by mouth every 4 hours as needed for mild pain, fever or headaches    Diffuse large B-cell lymphoma of extranodal site (H)       acyclovir 400 MG tablet    ZOVIRAX    60 tablet    Take 1 tablet (400 mg) by mouth 2 times daily    Diffuse large B-cell lymphoma, unspecified body region (H)       ascorbic acid 500 MG Tabs     30 tablet    Take 1 tablet (500 mg) by mouth daily    Diffuse large B-cell lymphoma, unspecified body region (H)       atenolol 25 MG tablet    TENORMIN    60 tablet    Take 1 tablet (25 mg) by mouth 2 times daily    Atrial tachycardia (H)       BENADRYL ALLERGY PO      Take 50 mg by mouth        calcium polycarbophil 625 MG tablet    FIBERCON     Take 1 tablet by mouth 2 times daily        calcium-vitamin D 600-400 MG-UNIT per tablet    CALTRATE    60 tablet    Take 2 tablets by mouth every morning    Diffuse large B-cell lymphoma, unspecified body region (H)       digoxin 125 MCG tablet    LANOXIN    30 tablet    Take 1 tablet (125 mcg) by mouth daily    Atrial tachycardia (H)       enoxaparin 60 MG/0.6ML injection    LOVENOX    60 Syringe    Inject 0.6 mLs (60 mg) Subcutaneous every 12 hours for 30 days    Acute deep vein thrombosis (DVT) of right upper extremity, unspecified vein (H)       fluconazole 100 MG tablet    DIFLUCAN    30 tablet    Take 1 tablet (100 mg) by mouth daily    Diffuse large B-cell lymphoma, unspecified body region (H)       furosemide 20 MG tablet    LASIX    5 tablet    Take 1 tablet (20 mg) by mouth daily for 5 days    Other hypervolemia       gabapentin 100 MG capsule     NEURONTIN    120 capsule    Take 2 capsules (200 mg) by mouth 3 times daily    Critical illness myopathy       HYDROCORTISONE PO      Take 100 mg by mouth IV        leucovorin 15 MG Tabs     12 tablet    Take 1 tablet (15 mg) by mouth every 6 hours for 3 days    Diffuse large B-cell lymphoma of extranodal site (H)       lidocaine visc 2% 2.5mL/5mL & maalox/mylanta w/ simeth 2.5mL/5mL & diphenhydrAMINE 5mg/5mL Susp suspension    [facility]     Swish and swallow 10 mLs in mouth 2 times daily        multivitamin, therapeutic with minerals Tabs tablet     30 each    Take 1 tablet by mouth daily    Diffuse large B-cell lymphoma, unspecified body region (H)       ondansetron 8 MG ODT tab    ZOFRAN-ODT    20 tablet    Take 1 tablet (8 mg) by mouth every 8 hours as needed    Diffuse large B-cell lymphoma of extranodal site (H)       potassium & sodium phosphates 280-160-250 MG Packet    NEUTRA-PHOS    60 packet    Take 1 packet by mouth 2 times daily    Hypophosphatemia       potassium chloride SA 20 MEQ CR tablet    K-DUR/KLOR-CON M    60 tablet    Take 2 tablets (40 mEq) by mouth daily while taking furosemide (Lasix). You can decrease potassium to 20 mEq daily once off Lasix.    Diffuse large B-cell lymphoma, unspecified body region (H)       predniSONE 5 MG tablet    DELTASONE    30 tablet    Take 1 tablet (5 mg) by mouth daily    Diffuse large B-cell lymphoma, unspecified body region (H)       traMADol 50 MG tablet    ULTRAM    30 tablet    Take 1 tablet (50 mg) by mouth every 6 hours as needed for moderate pain    Diffuse large B-cell lymphoma of extranodal site (H)

## 2017-10-04 NOTE — PATIENT INSTRUCTIONS
Contact numbers:  Triage Main Line: [phone]  After hours: [phone]    Call with chills and/or temperature greater than or equal to 100.5 and questions or concerns.    If after hours, weekends, or holidays, call main hospital  at  [phone] and ask for Oncology doctor on call.           October 2017 Sunday Monday Tuesday Wednesday Thursday Friday Saturday   1     2     3     4     [facility] DRAW   12:00 PM   (15 min.)   UC [facility] LAB DRAW   [facility] Lab Draw     [facility] ONC INFUSION 120   12:30 PM   (120 min.)    ONCOLOGY INFUSION   [facility] 5     6     7       8     9     PET ONCOLOGY WHOLE BODY    7:30 AM   (45 min.)   UUPET1   [facility] [place] PET CT 10     11     [facility] LAB DRAW    6:30 AM   (15 min.)    [facility]RAW   [facility]     [facility] ONC INFUSION 360    7:00 AM   (360 min.)    ONCOLOGY INFUSION   [facility]     UMP ONC RETURN   11:30 AM   (30 min.)   Lenore Rodriguez MD   [facility] Blood and Marrow Transplant 12     13     14       15     16     17     18     19     20     21       22     23     24     25     26     27     28       29     30     UMP NEW    9:15 AM   (30 min.)   Archana Green PA-C   [facility] Urology and Inst for Prostate and Urologic Cancers 31 November 2017 Sunday Monday Tuesday Wednesday Thursday Friday Saturday                  1     [facility] LAB DRAW    8:00 AM   (15 min.)   [facility] LAB DRAW   [facility] Lab Draw     [facility] ONC INFUSION 360    8:30 AM   (360 min.)   UC ONCOLOGY INFUSION   [facility] 2     3     4       5     6     7     8     9     10     11       12     13     14     15     16     17     18       19     20     21     22     [facility] LAB DRAW    8:00 AM   (15 min.)   [facility]   [facility]     [facility] ONC INFUSION 360    8:30 AM   (360 min.)    ONCOLOGY INFUSION     [facility] 23 24 25  Happy Birthday!       26     27     28     29     30                            Lab Results:  Recent Results (from the past 12 hour(s))   Blood component    Collection Time: 10/04/17  1:00 AM   Result Value Ref Range    Unit Number [id number]     Blood Component Type PlateletPheresis,LeukoRed Irrad (Part 3)     Division Number 00     Status of Unit Ready for patient 10/04/2017 0724     Blood Product Code [id number]     Unit Status ALEJANDRA    CBC with platelets differential    Collection Time: 10/04/17 12:09 PM   Result Value Ref Range    WBC 5.3 4.0 - 11.0 10e9/L    RBC Count 2.41 (L) 3.8 - 5.2 10e12/L    Hemoglobin 9.2 (L) 11.7 - 15.7 g/dL    Hematocrit 27.4 (L) 35.0 - 47.0 %     (H) 78 - 100 fl    MCH 38.2 (H) 26.5 - 33.0 pg    MCHC 33.6 31.5 - 36.5 g/dL    RDW 23.3 (H) 10.0 - 15.0 %    Platelet Count 64 (L) 150 - 450 10e9/L    Diff Method Automated Method     % Neutrophils 77.8 %    % Lymphocytes 6.9 %    % Monocytes 13.0 %    % Eosinophils 0.0 %    % Basophils 0.4 %    % Immature Granulocytes 1.9 %    Nucleated RBCs 0 0 /100    Absolute Neutrophil 4.1 1.6 - 8.3 10e9/L    Absolute Lymphocytes 0.4 (L) 0.8 - 5.3 10e9/L    Absolute Monocytes 0.7 0.0 - 1.3 10e9/L    Absolute Eosinophils 0.0 0.0 - 0.7 10e9/L    Absolute Basophils 0.0 0.0 - 0.2 10e9/L    Abs Immature Granulocytes 0.1 0 - 0.4 10e9/L    Absolute Nucleated RBC 0.0

## 2017-10-04 NOTE — NURSING NOTE
Chief Complaint   Patient presents with     Blood Draw     VS done, labs collected via venipuncture and PIV placed.  Hx DLBCL.

## 2017-10-06 RX ORDER — EPINEPHRINE 1 MG/ML
0.3 INJECTION INTRAMUSCULAR; INTRAVENOUS; SUBCUTANEOUS EVERY 5 MIN PRN
Status: CANCELLED | OUTPATIENT
Start: 2017-10-11

## 2017-10-06 RX ORDER — EPINEPHRINE 0.3 MG/.3ML
0.3 INJECTION SUBCUTANEOUS EVERY 5 MIN PRN
Status: CANCELLED | OUTPATIENT
Start: 2017-10-11

## 2017-10-06 RX ORDER — ALBUTEROL SULFATE 90 UG/1
1-2 AEROSOL, METERED RESPIRATORY (INHALATION)
Status: CANCELLED
Start: 2017-10-11

## 2017-10-06 RX ORDER — SODIUM CHLORIDE 9 MG/ML
1000 INJECTION, SOLUTION INTRAVENOUS CONTINUOUS PRN
Status: CANCELLED
Start: 2017-10-11

## 2017-10-06 RX ORDER — MEPERIDINE HYDROCHLORIDE 25 MG/ML
25 INJECTION INTRAMUSCULAR; INTRAVENOUS; SUBCUTANEOUS EVERY 30 MIN PRN
Status: CANCELLED | OUTPATIENT
Start: 2017-10-11

## 2017-10-06 RX ORDER — MEPERIDINE HYDROCHLORIDE 25 MG/ML
25 INJECTION INTRAMUSCULAR; INTRAVENOUS; SUBCUTANEOUS
Status: CANCELLED
Start: 2017-10-11

## 2017-10-06 RX ORDER — METHYLPREDNISOLONE SODIUM SUCCINATE 125 MG/2ML
125 INJECTION, POWDER, LYOPHILIZED, FOR SOLUTION INTRAMUSCULAR; INTRAVENOUS
Status: CANCELLED
Start: 2017-10-11

## 2017-10-06 RX ORDER — DIPHENHYDRAMINE HYDROCHLORIDE 50 MG/ML
50 INJECTION INTRAMUSCULAR; INTRAVENOUS
Status: CANCELLED
Start: 2017-10-11

## 2017-10-06 RX ORDER — ALBUTEROL SULFATE 0.83 MG/ML
2.5 SOLUTION RESPIRATORY (INHALATION)
Status: CANCELLED | OUTPATIENT
Start: 2017-10-11

## 2017-10-06 RX ORDER — PALONOSETRON 0.05 MG/ML
0.25 INJECTION, SOLUTION INTRAVENOUS ONCE
Status: CANCELLED
Start: 2017-10-11

## 2017-10-06 RX ORDER — ACETAMINOPHEN 325 MG/1
650 TABLET ORAL ONCE
Status: CANCELLED
Start: 2017-10-11

## 2017-10-06 RX ORDER — DIPHENHYDRAMINE HCL 50 MG
50 CAPSULE ORAL ONCE
Status: CANCELLED
Start: 2017-10-11

## 2017-10-06 RX ORDER — LORAZEPAM 2 MG/ML
0.5 INJECTION INTRAMUSCULAR EVERY 4 HOURS PRN
Status: CANCELLED
Start: 2017-10-11

## 2017-10-09 ENCOUNTER — HOSPITAL ENCOUNTER (OUTPATIENT)
Dept: PET IMAGING | Facility: CLINIC | Age: 57
Discharge: HOME | End: 2017-10-09
Attending: INTERNAL MEDICINE | Admitting: INTERNAL MEDICINE
Payer: COMMERCIAL

## 2017-10-09 ENCOUNTER — HOSPITAL ENCOUNTER (OUTPATIENT)
Dept: PET IMAGING | Facility: CLINIC | Age: 57
End: 2017-10-09
Attending: INTERNAL MEDICINE
Payer: COMMERCIAL

## 2017-10-09 DIAGNOSIS — C83.30 DIFFUSE LARGE B-CELL LYMPHOMA, UNSPECIFIED BODY REGION (H): ICD-10-CM

## 2017-10-09 LAB — GLUCOSE BLDC GLUCOMTR-MCNC: 99 MG/DL (ref 70–99)

## 2017-10-09 PROCEDURE — 78816 PET IMAGE W/CT FULL BODY: CPT | Mod: PS

## 2017-10-09 PROCEDURE — 70491 CT SOFT TISSUE NECK W/DYE: CPT

## 2017-10-09 PROCEDURE — 71260 CT THORAX DX C+: CPT

## 2017-10-09 PROCEDURE — 34300033 ZZH RX 343: Performed by: INTERNAL MEDICINE

## 2017-10-09 PROCEDURE — 82962 GLUCOSE BLOOD TEST: CPT

## 2017-10-09 PROCEDURE — A9552 F18 FDG: HCPCS | Performed by: INTERNAL MEDICINE

## 2017-10-09 PROCEDURE — 25000128 H RX IP 250 OP 636: Performed by: INTERNAL MEDICINE

## 2017-10-09 RX ORDER — IOPAMIDOL 755 MG/ML
10-140 INJECTION, SOLUTION INTRAVASCULAR ONCE
Status: COMPLETED | OUTPATIENT
Start: 2017-10-09 | End: 2017-10-09

## 2017-10-09 RX ADMIN — FLUDEOXYGLUCOSE F-18 10.26 MCI.: 500 INJECTION, SOLUTION INTRAVENOUS at 07:36

## 2017-10-09 RX ADMIN — IOPAMIDOL 76 ML: 755 INJECTION, SOLUTION INTRAVENOUS at 08:35

## 2017-10-10 ENCOUNTER — TELEPHONE (OUTPATIENT)
Dept: FAMILY MEDICINE | Facility: CLINIC | Age: 57
End: 2017-10-10

## 2017-10-10 NOTE — TELEPHONE ENCOUNTER
Reason for Call: Request for an order or referral:    Order or referral being requested:  [facility] calling to get a one time order, just for today:  Apply Iodosorb to wound and cover with Mepilex.    Amelia being seen today for this one time treatment.    Please review and advise.  Thank you..Kellee Taylor    Date needed: as soon as possible    Has the patient been seen by the PCP for this problem? YES    Additional comments: none    Phone number Patient can be reached at:  Other phone number:  [phone]    Best Time: any time    Can we leave a detailed message on this number?  YES    Call taken on 10/10/2017 at 2:02 PM by Kellee Taylor

## 2017-10-10 NOTE — TELEPHONE ENCOUNTER
Actually I have not seen her at all for any of this . None of us has. When you do call home health can you please make sure that all orders get directed to someone who has seen her at least recenlty ? The last time I saw her was 4/2016. I am concerned that she is having things ordered by me and I have not seen her in 18 months. Is there someone on her team that is more familiar with her? Comfort Sabillon M.D.

## 2017-10-10 NOTE — TELEPHONE ENCOUNTER
Message left on [facility] vm advising of all of Dr. Sabillon's instructions as noted below asking them to contact a Provider that has seen her more recently.  Frederic Power RN

## 2017-10-11 ENCOUNTER — INFUSION THERAPY VISIT (OUTPATIENT)
Dept: ONCOLOGY | Facility: CLINIC | Age: 57
End: 2017-10-11
Attending: INTERNAL MEDICINE
Payer: COMMERCIAL

## 2017-10-11 ENCOUNTER — OFFICE VISIT (OUTPATIENT)
Dept: TRANSPLANT | Facility: CLINIC | Age: 57
End: 2017-10-11
Attending: INTERNAL MEDICINE
Payer: COMMERCIAL

## 2017-10-11 ENCOUNTER — APPOINTMENT (OUTPATIENT)
Dept: LAB | Facility: CLINIC | Age: 57
End: 2017-10-11
Attending: INTERNAL MEDICINE
Payer: COMMERCIAL

## 2017-10-11 VITALS
WEIGHT: 123.4 LBS | RESPIRATION RATE: 18 BRPM | OXYGEN SATURATION: 98 % | HEART RATE: 62 BPM | SYSTOLIC BLOOD PRESSURE: 120 MMHG | DIASTOLIC BLOOD PRESSURE: 63 MMHG | BODY MASS INDEX: 25.79 KG/M2 | TEMPERATURE: 97.8 F

## 2017-10-11 DIAGNOSIS — C83.398 DIFFUSE LARGE B-CELL LYMPHOMA OF EXTRANODAL SITE: Primary | ICD-10-CM

## 2017-10-11 DIAGNOSIS — I82.621 ACUTE DEEP VEIN THROMBOSIS (DVT) OF OTHER VEIN OF RIGHT UPPER EXTREMITY (H): Primary | ICD-10-CM

## 2017-10-11 LAB
ALBUMIN SERPL-MCNC: 3.1 G/DL (ref 3.4–5)
ALP SERPL-CCNC: 95 U/L (ref 40–150)
ALT SERPL W P-5'-P-CCNC: 79 U/L (ref 0–50)
ANION GAP SERPL CALCULATED.3IONS-SCNC: 4 MMOL/L (ref 3–14)
AST SERPL W P-5'-P-CCNC: 58 U/L (ref 0–45)
BASOPHILS # BLD AUTO: 0 10E9/L (ref 0–0.2)
BASOPHILS NFR BLD AUTO: 1.5 %
BILIRUB SERPL-MCNC: 0.3 MG/DL (ref 0.2–1.3)
BUN SERPL-MCNC: 13 MG/DL (ref 7–30)
CALCIUM SERPL-MCNC: 9.3 MG/DL (ref 8.5–10.1)
CHLORIDE SERPL-SCNC: 106 MMOL/L (ref 94–109)
CO2 SERPL-SCNC: 31 MMOL/L (ref 20–32)
CREAT SERPL-MCNC: 0.7 MG/DL (ref 0.52–1.04)
DIFFERENTIAL METHOD BLD: ABNORMAL
EOSINOPHIL # BLD AUTO: 0 10E9/L (ref 0–0.7)
EOSINOPHIL NFR BLD AUTO: 0.5 %
ERYTHROCYTE [DISTWIDTH] IN BLOOD BY AUTOMATED COUNT: 20.5 % (ref 10–15)
GFR SERPL CREATININE-BSD FRML MDRD: 87 ML/MIN/1.7M2
GLUCOSE SERPL-MCNC: 104 MG/DL (ref 70–99)
HCT VFR BLD AUTO: 29.6 % (ref 35–47)
HGB BLD-MCNC: 9.7 G/DL (ref 11.7–15.7)
IMM GRANULOCYTES # BLD: 0 10E9/L (ref 0–0.4)
IMM GRANULOCYTES NFR BLD: 1 %
LYMPHOCYTES # BLD AUTO: 0.3 10E9/L (ref 0.8–5.3)
LYMPHOCYTES NFR BLD AUTO: 16.9 %
MCH RBC QN AUTO: 39 PG (ref 26.5–33)
MCHC RBC AUTO-ENTMCNC: 32.8 G/DL (ref 31.5–36.5)
MCV RBC AUTO: 119 FL (ref 78–100)
MONOCYTES # BLD AUTO: 0.6 10E9/L (ref 0–1.3)
MONOCYTES NFR BLD AUTO: 29.7 %
NEUTROPHILS # BLD AUTO: 1 10E9/L (ref 1.6–8.3)
NEUTROPHILS NFR BLD AUTO: 50.4 %
NRBC # BLD AUTO: 0 10*3/UL
NRBC BLD AUTO-RTO: 0 /100
PLATELET # BLD AUTO: 88 10E9/L (ref 150–450)
POTASSIUM SERPL-SCNC: 3.8 MMOL/L (ref 3.4–5.3)
PROT SERPL-MCNC: 6 G/DL (ref 6.8–8.8)
RBC # BLD AUTO: 2.49 10E12/L (ref 3.8–5.2)
SODIUM SERPL-SCNC: 141 MMOL/L (ref 133–144)
WBC # BLD AUTO: 2 10E9/L (ref 4–11)

## 2017-10-11 PROCEDURE — 80053 COMPREHEN METABOLIC PANEL: CPT | Performed by: INTERNAL MEDICINE

## 2017-10-11 PROCEDURE — 85025 COMPLETE CBC W/AUTO DIFF WBC: CPT | Performed by: INTERNAL MEDICINE

## 2017-10-11 PROCEDURE — 36415 COLL VENOUS BLD VENIPUNCTURE: CPT

## 2017-10-11 RX ORDER — SODIUM CHLORIDE 9 MG/ML
1000 INJECTION, SOLUTION INTRAVENOUS CONTINUOUS PRN
Status: CANCELLED
Start: 2017-10-19

## 2017-10-11 RX ORDER — LORAZEPAM 2 MG/ML
0.5 INJECTION INTRAMUSCULAR EVERY 4 HOURS PRN
Status: CANCELLED
Start: 2017-10-19

## 2017-10-11 RX ORDER — ACETAMINOPHEN 325 MG/1
650 TABLET ORAL ONCE
Status: CANCELLED
Start: 2017-10-19

## 2017-10-11 RX ORDER — ALBUTEROL SULFATE 0.83 MG/ML
2.5 SOLUTION RESPIRATORY (INHALATION)
Status: CANCELLED | OUTPATIENT
Start: 2017-10-19

## 2017-10-11 RX ORDER — DIPHENHYDRAMINE HCL 25 MG
50 CAPSULE ORAL ONCE
Status: CANCELLED
Start: 2017-10-19

## 2017-10-11 RX ORDER — ALBUTEROL SULFATE 90 UG/1
1-2 AEROSOL, METERED RESPIRATORY (INHALATION)
Status: CANCELLED
Start: 2017-10-19

## 2017-10-11 RX ORDER — METHYLPREDNISOLONE SODIUM SUCCINATE 125 MG/2ML
125 INJECTION, POWDER, LYOPHILIZED, FOR SOLUTION INTRAMUSCULAR; INTRAVENOUS
Status: CANCELLED
Start: 2017-10-19

## 2017-10-11 RX ORDER — PALONOSETRON 0.05 MG/ML
0.25 INJECTION, SOLUTION INTRAVENOUS ONCE
Status: CANCELLED
Start: 2017-10-19

## 2017-10-11 RX ORDER — EPINEPHRINE 0.3 MG/.3ML
0.3 INJECTION SUBCUTANEOUS EVERY 5 MIN PRN
Status: CANCELLED | OUTPATIENT
Start: 2017-10-19

## 2017-10-11 RX ORDER — EPINEPHRINE 1 MG/ML
0.3 INJECTION, SOLUTION, CONCENTRATE INTRAVENOUS EVERY 5 MIN PRN
Status: CANCELLED | OUTPATIENT
Start: 2017-10-19

## 2017-10-11 RX ORDER — DIPHENHYDRAMINE HYDROCHLORIDE 50 MG/ML
50 INJECTION INTRAMUSCULAR; INTRAVENOUS
Status: CANCELLED
Start: 2017-10-19

## 2017-10-11 RX ORDER — MEPERIDINE HYDROCHLORIDE 25 MG/ML
25 INJECTION INTRAMUSCULAR; INTRAVENOUS; SUBCUTANEOUS EVERY 30 MIN PRN
Status: CANCELLED | OUTPATIENT
Start: 2017-10-19

## 2017-10-11 RX ORDER — MEPERIDINE HYDROCHLORIDE 25 MG/ML
25 INJECTION INTRAMUSCULAR; INTRAVENOUS; SUBCUTANEOUS
Status: CANCELLED
Start: 2017-10-19

## 2017-10-11 ASSESSMENT — PAIN SCALES - GENERAL: PAINLEVEL: NO PAIN (0)

## 2017-10-11 NOTE — PROGRESS NOTES
Infusion Nursing Note:  Amelia Michel presents today for Cycle 4 Day 1 Rituximab, adriamycin, vincristine, cyclophosphamide Delayed.    Patient seen by provider today: No   present during visit today: Not Applicable.    Note: ANC 1.0, ANC>/=1200. Dr. Rodriguez notified.    TORB from Dr. Rodriguez at 10/11/2017 at 0720  -Delay treatment one week. Receive Cycle 4 Day 1 R-CHOP next Wednesday 10/18/17.  -Will call patient with PET results in lieu of today's appointmen    Patient notified and agreeable to new plan.      Intravenous Access:  No Intravenous access    Treatment Conditions:  Lab Results   Component Value Date    HGB 9.7 10/11/2017     Lab Results   Component Value Date    WBC 2.0 10/11/2017      Lab Results   Component Value Date    ANEU 1.0 10/11/2017     Lab Results   Component Value Date    PLT 88 10/11/2017      Lab Results   Component Value Date     10/11/2017                   Lab Results   Component Value Date    POTASSIUM 3.8 10/11/2017       Lab Results   Component Value Date    CR 0.70 10/11/2017                   Lab Results   Component Value Date    VIKTORIYA 9.3 10/11/2017                Lab Results   Component Value Date    BILITOTAL 0.3 10/11/2017           Lab Results   Component Value Date    ALBUMIN 3.1 10/11/2017                    Lab Results   Component Value Date    ALT 79 10/11/2017           Lab Results   Component Value Date    AST 58 10/11/2017     Results reviewed, labs did NOT meet treatment parameters: see above note for details    Discharge Plan:   Prescription refills given for gabapentin and neutraphos.  Discharge instructions reviewed with: Patient.  Patient and/or family verbalized understanding of discharge instructions and all questions answered.  Copy of AVS reviewed with patient and/or family.  Patient will return 10/19 for next appointment.  Departure Mode: Ambulatory.    Niranjan Kaba RN

## 2017-10-11 NOTE — MR AVS SNAPSHOT
After Visit Summary   10/11/2017    Amelia Michel    MRN: [id number]           Patient Information     Date Of Birth          1960        Visit Information        Provider Department      10/11/2017 11:30 AM Lenore Rodriguez MD [facility] Blood and Marrow Transplant        Today's Diagnoses     Acute deep vein thrombosis (DVT) of other vein of right upper extremity (H)    -  1          [facility] ([facility])  [address] [place] [id number]  Phone: [phone]  Clinic Hours:   Monday-Thursday:7am to 7pm   Friday: 7am to 5pm   Weekends and holidays:    8am to noon (in general)  If your fever is 100.5  or greater,   call the clinic.  After hours call the   hospital at [phone] or   [phone]. Ask for the BMT   fellow on-call            Follow-ups after your visit        Your next 10 appointments already scheduled     Oct 19, 2017  6:30 AM CDT   Masonic Lab Draw with [facility] MASONIC LAB DRAW   [facility] Masonic Lab Draw ([facility])    [address] [place] [address]   [phone]            Oct 19, 2017  7:00 AM CDT   Infusion 360 with UC ONCOLOGY INFUSION, UC 11 ATC   [facility] ([facility])    [address] [place] [address]   [phone]            Oct 30, 2017  9:30 AM CDT   (Arrive by 9:15 AM)   New Patient Visit with Archana Green PA-C   [facility] Inst for Prostate and Urologic Cancers ([facility])    [address] [place] [address]  4th [facility] [address]   [phone]            Nov 01, 2017  8:00 AM CDT   Masonic Lab Draw with UC MASONIC LAB DRAW   [facility] Masonic Lab Draw ([facility])    [address] [place] [address]   [phone]            Nov 01, 2017  8:30 AM CDT   Infusion 360 with UC ONCOLOGY INFUSION, UC 19 ATC   [facility]  [facility] ([facility])    [address]  2nd [facility] [address]   [phone]            Nov 22, 2017  8:00 AM CST   Masonic Lab Draw with UC MASONIC LAB DRAW   [facility] Lab Draw ([facility])    [address] [address]   [phone]            Nov 22, 2017  8:30 AM CST   Infusion 360 with  ONCOLOGY INFUSION, UC 19 ATC   [facility] ([facility])    [address] [address]   [phone]              Future tests that were ordered for you today     Open Future Orders        Priority Expected Expires Ordered    CBC with platelets differential Routine 10/16/2017 10/20/2017 10/11/2017    Comprehensive metabolic panel Routine 10/16/2017 10/20/2017 10/11/2017    US Extremity Upper Venous RT Routine 10/16/2017 10/20/2017 10/11/2017            Who to contact     If you have questions or need follow up information about today's clinic visit or your schedule please contact [facility]ARR[facility]PL[facility] directly at [phone].  Normal or non-critical lab and imaging results will be communicated to you by MyChart, letter or phone within 4 business days after the clinic has received the results. If you do not hear from us within 7 days, please contact the clinic through [employer] or phone. If you have a critical or abnormal lab result, we will notify you by phone as soon as possible.  Submit refill requests through [employer] or call your pharmacy and they will forward the refill request to us. Please allow 3 business days for your refill to be completed.          Additional Information About Your Visit        [employer] [facility] Information     [employer] gives you secure access to your electronic health record. If you see a primary care provider, you can also send messages to your care team and make appointments. If you have  questions, please call your primary care clinic.  If you do not have a primary care provider, please call [phone] and they will assist you.        Care EveryWhere ID     This is your Care EveryWhere ID. This could be used by other organizations to access your [place] medical records  [id number]        Your Vitals Were     Pulse Temperature Respirations Pulse Oximetry BMI (Body Mass Index)       62 97.8  F (36.6  C) (Oral) 18 98% 25.79 kg/m2        Blood Pressure from Last 3 Encounters:   10/11/17 120/63   10/04/17 134/53   09/29/17 131/69    Weight from Last 3 Encounters:   10/11/17 56 kg (123 lb 6.4 oz)   10/04/17 56.1 kg (123 lb 9.6 oz)   09/29/17 56.6 kg (124 lb 11.2 oz)                 Today's Medication Changes          These changes are accurate as of: 10/11/17  1:05 PM.  If you have any questions, ask your nurse or doctor.               Stop taking these medicines if you haven't already. Please contact your care team if you have questions.     furosemide 20 MG tablet   Commonly known as:  LASIX   Stopped by:  Lenore Rodriguez MD           leucovorin 15 MG Tabs   Stopped by:  Lenore Rodriguez MD                    Recent Review Flowsheet Data     BMT Recent Results Latest Ref Rng & Units 9/20/2017 9/27/2017 9/27/2017 9/29/2017 10/4/2017 10/9/2017 10/11/2017    WBC 4.0 - 11.0 10e9/L 2.9(L) 0.4(LL) - 3.5(L) 5.3 - 2.0(L)    Hemoglobin 11.7 - 15.7 g/dL 9.8(L) 9.4(L) - 9.2(L) 9.2(L) - 9.7(L)    Platelet Count 150 - 450 10e9/L 77(L) 24(LL) 52(L) 33(LL) 64(L) - 88(L)    Platelets 150 - 450 10:9/L - - - - - - -    Neutrophils (Absolute) 1.6 - 8.3 10e9/L 2.1 - - 3.0 4.1 - 1.0(L)    INR 0.86 - 1.14 - - - - - - -    Sodium 133 - 144 mmol/L 140 - - - - - 141    Potassium 3.4 - 5.3 mmol/L 4.0 - - - - - 3.8    Chloride 94 - 109 mmol/L 107 - - - - - 106    Glucose 70 - 99 mg/dL 112(H) - - - - 99 104(H)    Urea Nitrogen 7 - 30 mg/dL 17 - - - - - 13    Creatinine 0.52 - 1.04 mg/dL 0.60 - - - - - 0.70    Calcium  (Total) 8.5 - 10.1 mg/dL 9.3 - - - - - 9.3    Protein (Total) 6.8 - 8.8 g/dL 6.7(L) - - - - - 6.0(L)    Albumin 3.4 - 5.0 g/dL 3.5 - - - - - 3.1(L)    Bilirubin (Direct) 0.0 - 0.2 mg/dL - - - - - - -    Alkaline Phosphatase 40 - 150 U/L 100 - - - - - 95    AST 0 - 45 U/L 44 - - - - - 58(H)    ALT 0 - 50 U/L 70(H) - - - - - 79(H)    MCV 78 - 100 fl 113(H) 113(H) - 112(H) 114(H) - 119(H)               Primary Care Provider Office Phone # Fax #    Comfort Sabillon [phone] [address] [facility] [address]        Equal Access to Services     [facility]: Hadii aad ku hadasho Sogilbert, waaxda luqadaha, qaybta kaalmada adejayaalana, durga hermosillo . So [facility] [phone].    ATENCIÓN: Si marlee gutierrez, tiene a sarmiento disposición servicios gratuitos de asistencia lingüística. [facility] [phone].    We comply with applicable federal civil rights laws and [place] laws. We do not discriminate on the basis of race, color, national origin, age, disability, sex, sexual orientation, or gender identity.            Thank you!     Thank you for choosing [facility] BLOOD [facility] MARROW TRANSPLANT  for your care. Our goal is always to provide you with excellent care. Hearing back from our patients is one way we can continue to improve our services. Please take a few minutes to complete the written survey that you may receive in the mail after your visit with us. Thank you!             Your Updated Medication List - Protect others around you: Learn how to safely use, store and throw away your medicines at www.disposemymeds.org.          This list is accurate as of: 10/11/17  1:05 PM.  Always use your most recent med list.                   Brand Name Dispense Instructions for use Diagnosis    acetaminophen 325 MG tablet    TYLENOL     Take 2 tablets (650 mg) by mouth every 4 hours as needed for mild pain, fever or headaches    Diffuse large B-cell lymphoma of extranodal site (H)        acyclovir 400 MG tablet    ZOVIRAX    60 tablet    Take 1 tablet (400 mg) by mouth 2 times daily    Diffuse large B-cell lymphoma, unspecified body region (H)       ascorbic acid 500 MG Tabs     30 tablet    Take 1 tablet (500 mg) by mouth daily    Diffuse large B-cell lymphoma, unspecified body region (H)       atenolol 25 MG tablet    TENORMIN    60 tablet    Take 1 tablet (25 mg) by mouth 2 times daily    Atrial tachycardia (H)       BENADRYL ALLERGY PO      Take 50 mg by mouth        calcium polycarbophil 625 MG tablet    FIBERCON     Take 1 tablet by mouth 2 times daily        calcium-vitamin D 600-400 MG-UNIT per tablet    CALTRATE    60 tablet    Take 2 tablets by mouth every morning    Diffuse large B-cell lymphoma, unspecified body region (H)       digoxin 125 MCG tablet    LANOXIN    30 tablet    Take 1 tablet (125 mcg) by mouth daily    Atrial tachycardia (H)       enoxaparin 60 MG/0.6ML injection    LOVENOX    60 Syringe    Inject 0.6 mLs (60 mg) Subcutaneous every 12 hours for 30 days    Acute deep vein thrombosis (DVT) of right upper extremity, unspecified vein (H)       fluconazole 100 MG tablet    DIFLUCAN    30 tablet    Take 1 tablet (100 mg) by mouth daily    Diffuse large B-cell lymphoma, unspecified body region (H)       gabapentin 100 MG capsule    NEURONTIN    120 capsule    Take 2 capsules (200 mg) by mouth 3 times daily    Critical illness myopathy       HYDROCORTISONE PO      Take 100 mg by mouth IV        lidocaine visc 2% 2.5mL/5mL & maalox/mylanta w/ simeth 2.5mL/5mL & diphenhydrAMINE 5mg/5mL Susp suspension    [facility]     Swish and swallow 10 mLs in mouth 2 times daily        multivitamin, therapeutic with minerals Tabs tablet     30 each    Take 1 tablet by mouth daily    Diffuse large B-cell lymphoma, unspecified body region (H)       ondansetron 8 MG ODT tab    ZOFRAN-ODT    20 tablet    Take 1 tablet (8 mg) by mouth every 8 hours as needed    Diffuse large B-cell  lymphoma of extranodal site (H)       potassium & sodium phosphates 280-160-250 MG Packet    NEUTRA-PHOS    60 packet    Take 1 packet by mouth 2 times daily    Hypophosphatemia       potassium chloride SA 20 MEQ CR tablet    K-DUR/KLOR-CON M    60 tablet    Take 2 tablets (40 mEq) by mouth daily while taking furosemide (Lasix). You can decrease potassium to 20 mEq daily once off Lasix.    Diffuse large B-cell lymphoma, unspecified body region (H)       predniSONE 5 MG tablet    DELTASONE    30 tablet    Take 1 tablet (5 mg) by mouth daily    Diffuse large B-cell lymphoma, unspecified body region (H)       traMADol 50 MG tablet    ULTRAM    30 tablet    Take 1 tablet (50 mg) by mouth every 6 hours as needed for moderate pain    Diffuse large B-cell lymphoma of extranodal site (H)

## 2017-10-11 NOTE — MR AVS SNAPSHOT
After Visit Summary   10/11/2017    Amelia Michel    MRN: [id number]           Patient Information     Date Of Birth          1960        Visit Information        Provider Department      10/11/2017 7:00 AM  12 ATC;  ONCOLOGY INFUSION [facility]        Today's Diagnoses     Diffuse large B-cell lymphoma of extranodal site (H)    -  1      Care Instructions    Contact Numbers    [facility] [place] Main Line: [phone]  [facility] [place] Triage:  [phone]    Call triage with chills and/or temperature greater than or equal to 100.5, uncontrolled nausea/vomiting, diarrhea, constipation, dizziness, shortness of breath, chest pain, bleeding, unexplained bruising, or any new/concerning symptoms, questions/concerns.     If you are having any concerning symptoms or wish to speak to a provider before your next infusion visit, please call your care coordinator or triage to notify them so we can adequately serve you.       After Hours: [phone]    If after hours, weekends, or holidays, call main hospital  and ask for Oncology doctor on call.           October 2017 Sunday Monday Tuesday Wednesday Thursday Friday Saturday   1     2     3     4     [facility] MASONIC LAB DRAW   12:00 PM   (15 min.)   [facility] LAB DRAW   [facility] Lab Draw     [facility] ONC INFUSION 120   12:30 PM   (120 min.)    ONCOLOGY INFUSION   [facility] 5     6     7       8     9     PET ONCOLOGY WHOLE BODY    7:30 AM   (45 min.)   [id number]   [facility] PET CT     CT SOFT TISSUE NECK W    7:40 AM   (30 min.)   UUCT3   [facility] PET CT 10     11     [facility]    6[facility]30 AM   (15 min.)   [facility]   [facility] Lab Draw     [facility] ONC INFUSION 360    7:00 AM   (360 min.)    ONCOLOGY INFUSION   [facility]   11:30 AM   (30 min.)   Lenore Rodriguez MD   [facility] Blood and Marrow Transplant 12     13     14       15     16     17      18     19     [facility] LAB DRAW    6:30 AM   (15 min.)    MASONIC LAB DRAW   [facility] Masonic Lab Draw     [facility] ONC INFUSION 360    7:00 AM   (360 min.)   [facility] ONCOLOGY INFUSION   [facility] 20     21       22     23     24     25     26     27     28       29     30     [facility] NEW    9:15 AM   (30 min.)   Archana Green PA-C   [facility] Urology and [facility] for Prostate and Urologic Cancers 31 November 2017 Sunday Monday Tuesday Wednesday Thursday Friday Saturday                  1     [facility] LAB DRAW    8:00 AM   (15 min.)    MAS[facility] LAB DRAW   [facility] Mason[facility] Lab Draw     [facility] ONC INFUSION 360    8:30 AM   (360 min.)   UC ONCOLOGY INFUSION   [facility] 2     3     4       5     6     7     8     9     10     11       12     13     14     15     16     17     18       19     20     21     22     [facility] LAB DRAW    8:00 AM   (15 min.)    MAS[facility]IC LAB DRAW   [facility] Mason[facility] Lab Draw     [facility] ONC INFUSION 360    8:30 AM   (360 min.)    ONCOLOGY INFUSION   [facility] 23     24     25  Happy Birthday!       26     27     28     29     30                            Lab Results:  Recent Results (from the past 12 hour(s))   CBC with platelets differential    Collection Time: 10/11/17  6:43 AM   Result Value Ref Range    WBC 2.0 (L) 4.0 - 11.0 10e9/L    RBC Count 2.49 (L) 3.8 - 5.2 10e12/L    Hemoglobin 9.7 (L) 11.7 - 15.7 g/dL    Hematocrit 29.6 (L) 35.0 - 47.0 %     (H) 78 - 100 fl    MCH 39.0 (H) 26.5 - 33.0 pg    MCHC 32.8 31.5 - 36.5 g/dL    RDW 20.5 (H) 10.0 - 15.0 %    Platelet Count 88 (L) 150 - 450 10e9/L    Diff Method Automated Method     % Neutrophils 50.4 %    % Lymphocytes 16.9 %    % Monocytes 29.7 %    % Eosinophils 0.5 %    % Basophils 1.5 %    % Immature Granulocytes 1.0 %    Nucleated RBCs 0 0 /100    Absolute Neutrophil 1.0 (L) 1.6 - 8.3 10e9/L    Absolute Lymphocytes 0.3 (L) 0.8 - 5.3  10e9/L    Absolute Monocytes 0.6 0.0 - 1.3 10e9/L    Absolute Eosinophils 0.0 0.0 - 0.7 10e9/L    Absolute Basophils 0.0 0.0 - 0.2 10e9/L    Abs Immature Granulocytes 0.0 0 - 0.4 10e9/L    Absolute Nucleated RBC 0.0    Comprehensive metabolic panel    Collection Time: 10/11/17  6:43 AM   Result Value Ref Range    Sodium 141 133 - 144 mmol/L    Potassium 3.8 3.4 - 5.3 mmol/L    Chloride 106 94 - 109 mmol/L    Carbon Dioxide 31 20 - 32 mmol/L    Anion Gap 4 3 - 14 mmol/L    Glucose 104 (H) 70 - 99 mg/dL    Urea Nitrogen 13 7 - 30 mg/dL    Creatinine 0.70 0.52 - 1.04 mg/dL    GFR Estimate 87 >60 mL/min/1.7m2    GFR Estimate If Black >90 >60 mL/min/1.7m2    Calcium 9.3 8.5 - 10.1 mg/dL    Bilirubin Total 0.3 0.2 - 1.3 mg/dL    Albumin 3.1 (L) 3.4 - 5.0 g/dL    Protein Total 6.0 (L) 6.8 - 8.8 g/dL    Alkaline Phosphatase 95 40 - 150 U/L    ALT 79 (H) 0 - 50 U/L    AST 58 (H) 0 - 45 U/L               Follow-ups after your visit        Your next 10 appointments already scheduled     Oct 11, 2017 11:30 AM CDT   RETURN ONC with Lenore Rodriguez MD   [facility] Blood and Marrow Transplant ([facility])    [address] [place] [address]  [facility] [id number] [phone]            Oct 19, 2017  6:30 AM CDT   [facility] Lab Draw with [facility] DRAW   [facility]    [address] [facility]  [facility] [id number] [phone]            Oct 19, 2017  7:00 AM CDT   Infusion 360 with  ONCOLOGY INFUSION,  11 ATC   [facility] ([facility])    [address] [place] [address]  [facility] [id number] [phone]            Oct 30, 2017  9:30 AM CDT   (Arrive by 9:15 AM)   New Patient Visit with Archana Green PA-C   [facility]rology and Inst for Prostate and Urologic Cancers ([facility])    [address] [address]    [phone]            Nov 01, 2017  8:00 AM CDT   [facility] Lab Draw with [facility] LAB DRAW   [facility]    [address] [address] [phone]            Nov 01, 2017  8:30 AM CDT   Infusion 360 with UC ONCOLOGY INFUSION, UC 19 ATC   [facility] ([facility])    [address] [place] [id number] [phone]            Nov 22, 2017  8:00 AM CST   [facility] Lab Draw with [facility] LAB DRAW   [facility]    [address] [address] [phone]            Nov 22, 2017  8:30 AM CST   Infusion 360 with UC ONCOLOGY INFUSION   [facility] ([facility])    [address] [place] [id number] [phone]              Who to contact     If you have questions or need follow up information about today's clinic visit or your schedule please contact [facility] directly at [phone].  Normal or non-critical lab and imaging results will be communicated to you by Vizury[employer], letter or phone within 4 business days after the clinic has received the results. If you do not hear from us within 7 days, please contact the clinic through Vizuryhart or phone. If you have a critical or abnormal lab result, we will notify you by phone as soon as possible.  Submit refill requests through [employer] or call your pharmacy and they will forward the refill request to us. Please allow 3 business days for your refill to be completed.          Additional Information About Your Visit        [employer] Information     [employer] gives you secure access to your electronic health record. If you see a primary care provider, you can also send messages to your care team and make appointments. If you have questions, please call your  primary care clinic.  If you do not have a primary care provider, please call [phone] and they will assist you.        Care EveryWhere ID     This is your Care EveryWhere ID. This could be used by other organizations to access your [place] medical records  [id number]         Blood Pressure from Last 3 Encounters:   10/04/17 134/53   09/29/17 131/69   09/27/17 137/69    Weight from Last 3 Encounters:   10/04/17 56.1 kg (123 lb 9.6 oz)   09/29/17 56.6 kg (124 lb 11.2 oz)   09/27/17 54.4 kg (120 lb)              We Performed the Following     CBC with platelets differential     Comprehensive metabolic panel        Primary Care Provider Office Phone # Fax #    Comfort Sabillon [phone] [address] [address]        Equal Access to Services     [facility]: Hadii aad ku hadasho Soomaali, waaxda luqadaha, qaybta kaalmada adeegyada, durga bishopin haynegin hermosillo . So [facility] [phone].    ATENCIÓN: Si habla español, tiene a sarmiento disposición servicios gratuitos de asistencia lingüística. Llame al [phone].    We comply with applicable federal civil rights laws and [place] laws. We do not discriminate on the basis of race, color, national origin, age, disability, sex, sexual orientation, or gender identity.            Thank you!     Thank you for choosing [facility]  for your care. Our goal is always to provide you with excellent care. Hearing back from our patients is one way we can continue to improve our services. Please take a few minutes to complete the written survey that you may receive in the mail after your visit with us. Thank you!             Your Updated Medication List - Protect others around you: Learn how to safely use, store and throw away your medicines at www.disposemymeds.org.          This list is accurate as of: 10/11/17  7:56 AM.  Always use your most recent med list.                   Brand Name Dispense Instructions  for use Diagnosis    acetaminophen 325 MG tablet    TYLENOL     Take 2 tablets (650 mg) by mouth every 4 hours as needed for mild pain, fever or headaches    Diffuse large B-cell lymphoma of extranodal site (H)       acyclovir 400 MG tablet    ZOVIRAX    60 tablet    Take 1 tablet (400 mg) by mouth 2 times daily    Diffuse large B-cell lymphoma, unspecified body region (H)       ascorbic acid 500 MG Tabs     30 tablet    Take 1 tablet (500 mg) by mouth daily    Diffuse large B-cell lymphoma, unspecified body region (H)       atenolol 25 MG tablet    TENORMIN    60 tablet    Take 1 tablet (25 mg) by mouth 2 times daily    Atrial tachycardia (H)       BENADRYL ALLERGY PO      Take 50 mg by mouth        calcium polycarbophil 625 MG tablet    FIBERCON     Take 1 tablet by mouth 2 times daily        calcium-vitamin D 600-400 MG-UNIT per tablet    CALTRATE    60 tablet    Take 2 tablets by mouth every morning    Diffuse large B-cell lymphoma, unspecified body region (H)       digoxin 125 MCG tablet    LANOXIN    30 tablet    Take 1 tablet (125 mcg) by mouth daily    Atrial tachycardia (H)       enoxaparin 60 MG/0.6ML injection    LOVENOX    60 Syringe    Inject 0.6 mLs (60 mg) Subcutaneous every 12 hours for 30 days    Acute deep vein thrombosis (DVT) of right upper extremity, unspecified vein (H)       fluconazole 100 MG tablet    DIFLUCAN    30 tablet    Take 1 tablet (100 mg) by mouth daily    Diffuse large B-cell lymphoma, unspecified body region (H)       furosemide 20 MG tablet    LASIX    5 tablet    Take 1 tablet (20 mg) by mouth daily for 5 days    Other hypervolemia       gabapentin 100 MG capsule    NEURONTIN    120 capsule    Take 2 capsules (200 mg) by mouth 3 times daily    Critical illness myopathy       HYDROCORTISONE PO      Take 100 mg by mouth IV        leucovorin 15 MG Tabs     12 tablet    Take 1 tablet (15 mg) by mouth every 6 hours for 3 days    Diffuse large B-cell lymphoma of extranodal site (H)        lidocaine visc 2% 2.5mL/5mL & maalox/mylanta w/ simeth 2.5mL/5mL & diphenhydrAMINE 5mg/5mL Susp suspension    [facility]     Swish and swallow 10 mLs in mouth 2 times daily        multivitamin, therapeutic with minerals Tabs tablet     30 each    Take 1 tablet by mouth daily    Diffuse large B-cell lymphoma, unspecified body region (H)       ondansetron 8 MG ODT tab    ZOFRAN-ODT    20 tablet    Take 1 tablet (8 mg) by mouth every 8 hours as needed    Diffuse large B-cell lymphoma of extranodal site (H)       potassium & sodium phosphates 280-160-250 MG Packet    NEUTRA-PHOS    60 packet    Take 1 packet by mouth 2 times daily    Hypophosphatemia       potassium chloride SA 20 MEQ CR tablet    K-DUR/KLOR-CON M    60 tablet    Take 2 tablets (40 mEq) by mouth daily while taking furosemide (Lasix). You can decrease potassium to 20 mEq daily once off Lasix.    Diffuse large B-cell lymphoma, unspecified body region (H)       predniSONE 5 MG tablet    DELTASONE    30 tablet    Take 1 tablet (5 mg) by mouth daily    Diffuse large B-cell lymphoma, unspecified body region (H)       traMADol 50 MG tablet    ULTRAM    30 tablet    Take 1 tablet (50 mg) by mouth every 6 hours as needed for moderate pain    Diffuse large B-cell lymphoma of extranodal site (H)

## 2017-10-11 NOTE — PROGRESS NOTES
Telephone Visit  10/11/2017         Disease and Treatment History:  1. Complicated patient with history of Rheumatoid Arthritis status post long term treatment with methotrexate with recent significant complicated course with cardiac arrest, admission with hypotension, sepsis, ICU stay and intubation with subsequent additional readmission from TCU in 6/2017 for FUO with extensive work-up with eventual finding of progression cytopenias with eventual lymphoma diagnosis  2. Bone Marrow Biopsy: 7/12/2017: Highly suspicious for involvement by B cell lymphoma with foci of large atypical CD20+ cells  3. PET CT 7/19/2017: Extensive activity: Right paratracheal lesion with SUV 28, many liver lesions with SUV 15, cardiac lesion intraarterial septum, numerous bone lesions.  4. 7/21 Liver Biopsy: Consistent with Diffuse Large B Cell Lymphoma non-germinal center phenotype  -- FISH for myc, bcl2, bcl6 negative for double-hit lymphoma  5. IPI based on Age < 60 (0 points) + PS 2 (1 + point) + Stage IV Disease (1 point) + Extranodal disease > 1 site (1 point) + elevated LDH (1 point) = 4 Poor Risk Disease  6. Cycle 1 given as R-EPOCH Day 1 = 7/27/2017  -- complications of transfusion dependence and need for acute rehab placement (likely more result of extensive hospital stays)  - LP negative for CNS involvement 8/23/2017  7. Cycle #2: Transition to R-CHOP Day 1 = 8/22/2017  - Day 15 high dose MTX for CNS prophylaxis  - complicated by significant fluid overload and PICC associated DVT  8. Cycle #3 Day 1 = 9/20/2017      HPI: I called Amelia to review her PET CT results given that we held chemo this morning and she didn't want to wait for her appointment 3.5 hours later. She notes some increased fatigue today but no fevers or chill or nausea. She notes that she is tolerating the lovenox fine without any bleeding. She notes that her arm wound is much improved. She notes not chest pain or SOB, no cough, no edema. She and her   have questions about the CNS prophy (MTX) and the blood thinners going forward.    Physical Exam:    No exam today as the entire visit was spent discussing results on the phone    Labs:  Results for FIDELIA MIDDLETON (MRN [id number]) as of 10/11/2017 12:02   Ref. Range 9/29/2017 11:05 10/4/2017 12:09 10/11/2017 06:43   WBC Latest Ref Range: 4.0 - 11.0 10e9/L 3.5 (L) 5.3 2.0 (L)   Hemoglobin Latest Ref Range: 11.7 - 15.7 g/dL 9.2 (L) 9.2 (L) 9.7 (L)   Hematocrit Latest Ref Range: 35.0 - 47.0 % 27.6 (L) 27.4 (L) 29.6 (L)   Platelet Count Latest Ref Range: 150 - 450 10e9/L 33 (LL) 64 (L) 88 (L)   RBC Count Latest Ref Range: 3.8 - 5.2 10e12/L 2.46 (L) 2.41 (L) 2.49 (L)   MCV Latest Ref Range: 78 - 100 fl 112 (H) 114 (H) 119 (H)   MCH Latest Ref Range: 26.5 - 33.0 pg 37.4 (H) 38.2 (H) 39.0 (H)   MCHC Latest Ref Range: 31.5 - 36.5 g/dL 33.3 33.6 32.8   RDW Latest Ref Range: 10.0 - 15.0 % 23.1 (H) 23.3 (H) 20.5 (H)   Diff Method Unknown Manual Differential Automated Method Automated Method   % Neutrophils Latest Units: % 85.8 77.8 50.4   % Lymphocytes Latest Units: % 4.4 6.9 16.9   % Monocytes Latest Units: % 4.4 13.0 29.7   % Eosinophils Latest Units: % 0.0 0.0 0.5   % Basophils Latest Units: % 2.7 0.4 1.5   % Metamyelocytes Latest Units: % 0.9     % Myelocytes Latest Units: % 1.8     % Immature Granulocytes Latest Units: %  1.9 1.0   Nucleated RBCs Latest Ref Range: 0 /100  0 0   Absolute Neutrophil Latest Ref Range: 1.6 - 8.3 10e9/L 3.0 4.1 1.0 (L)   Absolute Lymphocytes Latest Ref Range: 0.8 - 5.3 10e9/L 0.2 (L) 0.4 (L) 0.3 (L)       PET CT:  Impression:   1. On the fusion PET CT, there is no definite abnormal metabolic  activity in the mucosal space, soft tissues, or cervical prieto chains.  There has been resolution of the increased FDG uptake associated with  the level 2A lymph node, which is also decreased in size.  2. Resolution of the previously visualized parotid inflammation.      CHEST:  There is no suspicious  FDG uptake in the chest.      There has been significant interval improvement in the FDG avid  mediastinal lymphadenopathy. For example, a right lower paratracheal  lymph node now measures approximately 0.7 cm in short transverse  diameter (series 3, image 102), previously up to 1.5 cm in a similar  plane on 7/18/2017. A left upper mediastinal lymph node posterior to  the left subclavian vein (series 3, image 94) now measures 0.6 cm in  short transverse diameter, previously measuring at least 1.1 cm. There  is resolution of the previously visualized increased FDG uptake  throughout these lymph nodes as well as within the thickened  interatrial septum. The interatrial septum now has a normal thin  appearance. The axillary lymph nodes are stable or decreased in size,  with no associated increased FDG uptake.     The thyroid gland is homogenous. The heart size is stable and mildly  enlarged. Stable postsurgical changes to the sternum and  calcifications in the anterior mediastinum. The thoracic vasculature  demonstrates a few scattered calcifications is otherwise within normal  limits. Resolution of the previously visualized pleural effusions.  There is a linear band of residual atelectasis in the right lower  lobe. Linear subpleural scarring in the anterior left upper lobe and  lingula. No suspicious pulmonary nodules.     ABDOMEN AND PELVIS:  There is no suspicious FDG uptake in the abdomen or pelvis.     There are no suspicious hepatic lesions. There is no splenomegaly or  evidence for splenic or pancreatic mass lesion. There are no  suspicious adrenal mass lesions or opaque gallbladder calculi.  There  is subcentimeter renal hypodensities which are too small to  characterize, but most likely simple renal cysts. There is symmetric  nephrographic renal phase without hydronephrosis. There is no evidence  for diverticulitis, bowel obstruction or free fluid.       LOWER EXTREMITIES:   No abnormal masses or  hypermetabolic lesions. There is scattered  muscular uptake.     BONES:   Interval resolution of the innumerable foci of increased FDG uptake  throughout the bony skeleton. There are no residual FDG avid lesions  identified. There is mild heterogeneity of the CT appearance of the  bones in some of the regions that were previously hypermetabolic,  without focal well-circumscribed lytic or sclerotic mass lesion. There  is a centrally based sclerotic lesion in the medullary cavity of the  right distal femur in the intercondylar notch, without focal increased  FDG uptake likely representing a benign enchondroma. There are  numerous healing and healed rib fractures. There are moderate  degenerative changes in the spine.            IMPRESSION:  In this patient with a history of diffuse large B-cell lymphoma, there  has been complete response by the Lugano criteria:  1. Resolved FDG uptake throughout the previously visualized lesions as  follows:  A. Decreased size of numerous enlarged mediastinal lymph nodes,  without persistent increased FDG uptake.  B. Resolution of the previously visualized numerous foci of increased  FDG uptake in the skeleton.  C. Resolution of the multifocal increased FDG uptake throughout the  liver.  D. Resolution of the focal FDG uptake in the low pelvis near the  uterus.  E. Resolution of the increased FDG uptake and thickening of the  interatrial septum.     2. Resolution of pleural effusions and ascites.  3. Stable mild cardiomegaly.  4. See dedicated neuroradiology report for the results of the high      A/P: 57 yo woman with history of rhematoid arthritis and recent diagnosis of stage IVB high risk aggressive Diffuse large B cell lymphoma    1. Lymphoma: Got cycle #1 in the hospital and I chose R-EPOCH for infusional nature due to possible intracardiac involvement and also extensive disease to allow for slower lymphoma kill. Tolerated this well, aside from significant cytopenias (which were  present at diagnosis). Then transitioned to R-CHOP to finish out a total of 6 cycles of chemotherapy (1 R-EPOCH and 5 R-CHOP) with initially planned for High Dose MTX on Day 15 of cycle 2,4, and 6 due to high IPI.   -- Amelia had significant toxicity with the high dose methotrexate with PICC associated clot, significant volume overload, increased neuropathy and thus moving forward I am planning on doing a couple of IT chemo as the prophylaxis given the risk/benefit ratio.    We reviewed today that her PET shows a complete remission by Lugano criteria. This is great news and they were happy to hear this. We did delay her cycle of R-CHOP to next week given her bordeline ANC/plt to next week.     Plan for Cycle 4 R-CHOP next week and will plan to do an IT chemo with IR at the end of cycle 4 with her count recovery.    2. ID: . Remains on acyclovir and fluconazole. SANC 1.0 today so didn't restart her bacterial prophy    3. Rheumatoid arthritis: is on prednisone 5 mg daily. Will continue this in between her chemo sessions.     4. CV: A. Fib. On digoxin and atenolol. Now on lovenox for DVT so anticoagulated. Will need to start aspirin if the lovenox is stopped    5. PICC Associated RUE DVT: on therapeutic lovenox. Plan for repeat ultrasound in next week and if cleared will stop lovenox      Final Plan:    - Ultrasound next week to follow-up clot  - Rosalinda apt and labs as well.  - cycle 5 and 6 will need to be rescheduled due to the delay of cycle 4 and we will need to get her scheduled for her IT chemo      Lenore Rodriguez

## 2017-10-11 NOTE — PATIENT INSTRUCTIONS
Contact Numbers    [facility] [place]: [phone]  [facility] [place] Triage:  [phone]    Call triage with chills and/or temperature greater than or equal to 100.5, uncontrolled nausea/vomiting, diarrhea, constipation, dizziness, shortness of breath, chest pain, bleeding, unexplained bruising, or any new/concerning symptoms, questions/concerns.     If you are having any concerning symptoms or wish to speak to a provider before your next infusion visit, please call your care coordinator or triage to notify them so we can adequately serve you.       After Hours: [phone]    If after hours, weekends, or holidays, call main hospital  and ask for Oncology doctor on call.           October 2017 Sunday Monday Tuesday Wednesday Thursday Friday Saturday   1     2     3     4     [facility]   12:00 PM   (15 min.)    [facility]     P [facility]C INFUSION 120   12:30 PM   (120 min.)    ONCOLOGY INFUSION   [facility] 5     6     7       8     9     PET ONCOLOGY WHOLE BODY    7:30 AM   (45 min.)   UUPET1   [facility] PET CT     CT SOFT TISSUE NECK W    7:40 AM   (30 min.)   UUCT3   [facility] PET CT 10     11     [facility] DRAW    6:30 AM   (15 min.)    [facility] DRAW   [facility] Draw     [facility] ONC INFUSION 360    7:00 AM   (360 min.)    ONCOLOGY INFUSION   [facility]     [facility] ONC RETURN   11:30 AM   (30 min.)   Lenore Rodriguez MD   [facility] Blood and Marrow Transplant 12     13     14       15     16     17     18     19     [facility] DRAW    6:30 AM   (15 min.)    [facility] DRAW   [facility] Draw     [facility] ONC INFUSION 360    7:00 AM   (360 min.)    ONCOLOGY INFUSION   [facility] 20     21       22     23     24     25     26     27     28       29     30     [facility] NEW    9:15 AM   (30 min.)   Archana Green PA-C   [facility] Inst for Prostate and Urologic  Cancers 31 November 2017 Sunday Monday Tuesday Wednesday Thursday Friday Saturday                  1     [facility] DRAW    8:00 AM   (15 min.)    [facility] DRAW   [facility] Lab Draw     [facility] ONC INFUSION 360    8:30 AM   (360 min.)    ONCOLOGY INFUSION   [facility] 2     3     4       5     6     7     8     9     10     11       12     13     14     15     16     17     18       19     20     21     22     [facility] DRAW    8:00 AM   (15 min.)    [facility] DRAW   [facility] Lab Draw     [facility] ONC INFUSION 360    8:30 AM   (360 min.)    ONCOLOGY INFUSION   [facility] 23     24     25  Happy Birthday!       26     27     28     29     30                            Lab Results:  Recent Results (from the past 12 hour(s))   CBC with platelets differential    Collection Time: 10/11/17  6:43 AM   Result Value Ref Range    WBC 2.0 (L) 4.0 - 11.0 10e9/L    RBC Count 2.49 (L) 3.8 - 5.2 10e12/L    Hemoglobin 9.7 (L) 11.7 - 15.7 g/dL    Hematocrit 29.6 (L) 35.0 - 47.0 %     (H) 78 - 100 fl    MCH 39.0 (H) 26.5 - 33.0 pg    MCHC 32.8 31.5 - 36.5 g/dL    RDW 20.5 (H) 10.0 - 15.0 %    Platelet Count 88 (L) 150 - 450 10e9/L    Diff Method Automated Method     % Neutrophils 50.4 %    % Lymphocytes 16.9 %    % Monocytes 29.7 %    % Eosinophils 0.5 %    % Basophils 1.5 %    % Immature Granulocytes 1.0 %    Nucleated RBCs 0 0 /100    Absolute Neutrophil 1.0 (L) 1.6 - 8.3 10e9/L    Absolute Lymphocytes 0.3 (L) 0.8 - 5.3 10e9/L    Absolute Monocytes 0.6 0.0 - 1.3 10e9/L    Absolute Eosinophils 0.0 0.0 - 0.7 10e9/L    Absolute Basophils 0.0 0.0 - 0.2 10e9/L    Abs Immature Granulocytes 0.0 0 - 0.4 10e9/L    Absolute Nucleated RBC 0.0    Comprehensive metabolic panel    Collection Time: 10/11/17  6:43 AM   Result Value Ref Range    Sodium 141 133 - 144 mmol/L    Potassium 3.8 3.4 - 5.3 mmol/L    Chloride 106 94 - 109  mmol/L    Carbon Dioxide 31 20 - 32 mmol/L    Anion Gap 4 3 - 14 mmol/L    Glucose 104 (H) 70 - 99 mg/dL    Urea Nitrogen 13 7 - 30 mg/dL    Creatinine 0.70 0.52 - 1.04 mg/dL    GFR Estimate 87 >60 mL/min/1.7m2    GFR Estimate If Black >90 >60 mL/min/1.7m2    Calcium 9.3 8.5 - 10.1 mg/dL    Bilirubin Total 0.3 0.2 - 1.3 mg/dL    Albumin 3.1 (L) 3.4 - 5.0 g/dL    Protein Total 6.0 (L) 6.8 - 8.8 g/dL    Alkaline Phosphatase 95 40 - 150 U/L    ALT 79 (H) 0 - 50 U/L    AST 58 (H) 0 - 45 U/L

## 2017-10-11 NOTE — NURSING NOTE
Chief Complaint   Patient presents with     Blood Draw     Labs drawn via  by RN. VS taken. Needs IV started in infusion.      Lluvia Haq RN

## 2017-10-12 LAB — TRANSF REACT PLASRBC-IMP: NORMAL

## 2017-10-13 NOTE — CONSULTS
Laboratory Medicine and Pathology  Transfusion Medicine- Transfusion Reaction    Amelia Michel MRN# [id number]   YOB: 1960 Age: 56 year old   Date of Reaction: 2017       Transfusion Reaction Evaluation   Impression  The patient had a Definitive Febrile Non-hemolytic Transfusion Reaction of Possible Imputability after a transfusion of 1 unit of platelets and 1 unit of FFP. In the context of her persistent fevers, transfusion reaction is possible but less likely explanation for her temperature rise.    Final culture results are negative.    Recommendation    Transfuse as needed.       ----------------------------------    History  Amelia is a 56 year old female with h/o RA and new diagnosis of stage IVB DLBCL who presented with cardiac arrest and cytopenias who is undergoing treatment. She has been evaluated extensively by both the primary team and ID regarding persistent fevers.    On 2017 she received a unit of RBCs for a hemoglobin of 7.5 (goal > 8), beginning at 22:55 and stopped at 01:10 on 2017 due to the patient experiencing Fevers, Chills and Rigors. Hypotension was reported by the nursing team. However blood pressure at this time was recorded to be 109/56 compared to 94/54 at the start of the transfusion. (see vitals below)    Patient also received 1 Unit of FFP on 2017 at 20:21 on and ending at 22:47.     Reported Symptoms  Fevers - (Temperature Rise 97.8-100.3)  Chills/Rigors    Vital Signs (From [facility])            Transfusion History: Patient has previously received RBCs () and Platelets ()  Transfusion Reaction History: No previous reaction on file  Type and Screen History: O negative, negative antibody screens    Blood Bank Investigation  Product Type: RBC  Unit Number: [id number]  Amount Remainin mL  Post-Transfusion Clerical Check: Correct  ABO/Rh: The unit type was O negative and the patient was O negative. The unit was  compatible.  TODD: Negative  Post-Transfusion Plasma: Straw colored    Product Type: FFP  Unit Number: [id number]  Amount Remainin mL  Post-Transfusion Clerical Check: Correct  ABO/Rh: The unit type was O positive and the patient was O negative. The unit was compatible.  TODD: Negative  Post-Transfusion Plasma: Straw colored    The units were cultured and Gram stain was performed. 10 ml of saline was added prior to inoculating the blood culture bottles for the FFP unit. Gram stain showed no organisms and cultures were negative.    [facility] Hem[facility]igilance  Case Definition: Febrile Non-Hemolytic Transfusion Reaction  Severity: Non-Severe  Imputability: Possible     This note was prepared by Noble JOHNSON who was acting as a medical scribe.     I, Pj Birch MD, have reviewed the patient's lab and clinical data, and I have discussed this case with the medical student rotating on the transfusion medicine service (ALEX Johnson).  I have edited this note, and the findings and recommendations documented in the note reflect my medical assessment of the reported transfusion reaction.   This meets the definition of a non-severe, febrile non-hemolytic transfusion reaction of possible imputability.  Recommendation: Transfuse as needed.    Pj Birch M.D.  Professor, Transfusion Medicine  Laboratory Medicine & Pathology  Pager: [phone]

## 2017-10-16 DIAGNOSIS — C83.398 DIFFUSE LARGE B-CELL LYMPHOMA OF EXTRANODAL SITE: Primary | ICD-10-CM

## 2017-10-16 DIAGNOSIS — I82.621 ACUTE DEEP VEIN THROMBOSIS (DVT) OF OTHER VEIN OF RIGHT UPPER EXTREMITY (H): ICD-10-CM

## 2017-10-16 DIAGNOSIS — S41.102D WOUND OF LEFT UPPER EXTREMITY, SUBSEQUENT ENCOUNTER: ICD-10-CM

## 2017-10-16 LAB
ALBUMIN SERPL-MCNC: 3.4 G/DL (ref 3.4–5)
ALP SERPL-CCNC: 107 U/L (ref 40–150)
ALT SERPL W P-5'-P-CCNC: 76 U/L (ref 0–50)
ANION GAP SERPL CALCULATED.3IONS-SCNC: 5 MMOL/L (ref 3–14)
AST SERPL W P-5'-P-CCNC: 43 U/L (ref 0–45)
BASOPHILS # BLD AUTO: 0 10E9/L (ref 0–0.2)
BASOPHILS NFR BLD AUTO: 1.3 %
BILIRUB SERPL-MCNC: 0.8 MG/DL (ref 0.2–1.3)
BUN SERPL-MCNC: 16 MG/DL (ref 7–30)
CALCIUM SERPL-MCNC: 9.6 MG/DL (ref 8.5–10.1)
CHLORIDE SERPL-SCNC: 108 MMOL/L (ref 94–109)
CO2 SERPL-SCNC: 28 MMOL/L (ref 20–32)
CREAT SERPL-MCNC: 0.6 MG/DL (ref 0.52–1.04)
DIFFERENTIAL METHOD BLD: ABNORMAL
EOSINOPHIL # BLD AUTO: 0.1 10E9/L (ref 0–0.7)
EOSINOPHIL NFR BLD AUTO: 1.6 %
ERYTHROCYTE [DISTWIDTH] IN BLOOD BY AUTOMATED COUNT: 17.9 % (ref 10–15)
GFR SERPL CREATININE-BSD FRML MDRD: >90 ML/MIN/1.7M2
GLUCOSE SERPL-MCNC: 91 MG/DL (ref 70–99)
HCT VFR BLD AUTO: 32.4 % (ref 35–47)
HGB BLD-MCNC: 10.8 G/DL (ref 11.7–15.7)
IMM GRANULOCYTES # BLD: 0 10E9/L (ref 0–0.4)
IMM GRANULOCYTES NFR BLD: 0.6 %
LYMPHOCYTES # BLD AUTO: 0.6 10E9/L (ref 0.8–5.3)
LYMPHOCYTES NFR BLD AUTO: 19.5 %
MCH RBC QN AUTO: 39.3 PG (ref 26.5–33)
MCHC RBC AUTO-ENTMCNC: 33.3 G/DL (ref 31.5–36.5)
MCV RBC AUTO: 118 FL (ref 78–100)
MONOCYTES # BLD AUTO: 0.7 10E9/L (ref 0–1.3)
MONOCYTES NFR BLD AUTO: 21.8 %
NEUTROPHILS # BLD AUTO: 1.7 10E9/L (ref 1.6–8.3)
NEUTROPHILS NFR BLD AUTO: 55.2 %
NRBC # BLD AUTO: 0 10*3/UL
NRBC BLD AUTO-RTO: 0 /100
PLATELET # BLD AUTO: 123 10E9/L (ref 150–450)
POTASSIUM SERPL-SCNC: 3.9 MMOL/L (ref 3.4–5.3)
PROT SERPL-MCNC: 6.2 G/DL (ref 6.8–8.8)
RBC # BLD AUTO: 2.75 10E12/L (ref 3.8–5.2)
SODIUM SERPL-SCNC: 141 MMOL/L (ref 133–144)
WBC # BLD AUTO: 3.1 10E9/L (ref 4–11)

## 2017-10-16 PROCEDURE — 85025 COMPLETE CBC W/AUTO DIFF WBC: CPT | Performed by: INTERNAL MEDICINE

## 2017-10-16 PROCEDURE — 80053 COMPREHEN METABOLIC PANEL: CPT | Performed by: INTERNAL MEDICINE

## 2017-10-17 ENCOUNTER — TELEPHONE (OUTPATIENT)
Dept: TRANSPLANT | Facility: CLINIC | Age: 57
End: 2017-10-17

## 2017-10-17 NOTE — TELEPHONE ENCOUNTER
Called Amelia to review all the result of her tests    Results for AMELIA MIDDLETON (MRN [id number]) as of 10/17/2017 17:52   Ref. Range 10/16/2017 09:19   Sodium Latest Ref Range: 133 - 144 mmol/L 141   Potassium Latest Ref Range: 3.4 - 5.3 mmol/L 3.9   Chloride Latest Ref Range: 94 - 109 mmol/L 108   Carbon Dioxide Latest Ref Range: 20 - 32 mmol/L 28   Urea Nitrogen Latest Ref Range: 7 - 30 mg/dL 16   Creatinine Latest Ref Range: 0.52 - 1.04 mg/dL 0.60   GFR Estimate Latest Ref Range: >60 mL/min/1.7m2 >90   GFR Estimate If Black Latest Ref Range: >60 mL/min/1.7m2 >90   Calcium Latest Ref Range: 8.5 - 10.1 mg/dL 9.6   Anion Gap Latest Ref Range: 3 - 14 mmol/L 5   Albumin Latest Ref Range: 3.4 - 5.0 g/dL 3.4   Protein Total Latest Ref Range: 6.8 - 8.8 g/dL 6.2 (L)   Bilirubin Total Latest Ref Range: 0.2 - 1.3 mg/dL 0.8   Alkaline Phosphatase Latest Ref Range: 40 - 150 U/L 107   ALT Latest Ref Range: 0 - 50 U/L 76 (H)   AST Latest Ref Range: 0 - 45 U/L 43   Glucose Latest Ref Range: 70 - 99 mg/dL 91   WBC Latest Ref Range: 4.0 - 11.0 10e9/L 3.1 (L)   Hemoglobin Latest Ref Range: 11.7 - 15.7 g/dL 10.8 (L)   Hematocrit Latest Ref Range: 35.0 - 47.0 % 32.4 (L)   Platelet Count Latest Ref Range: 150 - 450 10e9/L 123 (L)   RBC Count Latest Ref Range: 3.8 - 5.2 10e12/L 2.75 (L)   MCV Latest Ref Range: 78 - 100 fl 118 (H)       Ultrasound 10/16/2017:  Impression:  Right upper extremity: No evidence of deep venous thrombosis, with  resolution of previously demonstrated nonocclusive DVT in one of the  right brachial veins.         We discussed the results of the repeat ultrasound that shows resolution of the PICC associated DVT. We reviewed her labs that show she will be ready for cycle #4 on Thursday as scheduled.    We reviewed that she could come off the lovenox for there DVT treatment at this point.    We discussed the issue of anticoagulation for there atrial fibrillation. We reviewed that coumadin would be a  challenge right now given that she will be in need of an LP after count recovery with this cycle as well as cycle #6 and the fact that her platelets do drop. Thus we discussed 2 options  1. No coumadin and no blood thinners until she is done with chemo and procedures. We reviewed the stroke risk of a few percent a year without anticoagulation. She would be off blood thinners for about 9-10 more weeks if we went this route so risk is likely pretty low.    2. Low dose lovenox 40 mg SQ daily to diminish risk.    She wants to think about these options and make a decision when she comes in Thursday.    She will need an LP with IT chemo in IR once counts recover from cycle #4 but timing of this will depend on counts. Could tentatively schedule for 11/7 or 11/8 prior to cycle #5 on 11/9.    Lenore Rodriguez

## 2017-10-19 ENCOUNTER — ONCOLOGY VISIT (OUTPATIENT)
Dept: ONCOLOGY | Facility: CLINIC | Age: 57
End: 2017-10-19
Attending: PHYSICIAN ASSISTANT
Payer: COMMERCIAL

## 2017-10-19 ENCOUNTER — INFUSION THERAPY VISIT (OUTPATIENT)
Dept: ONCOLOGY | Facility: CLINIC | Age: 57
End: 2017-10-19
Attending: INTERNAL MEDICINE
Payer: COMMERCIAL

## 2017-10-19 VITALS
BODY MASS INDEX: 25.87 KG/M2 | SYSTOLIC BLOOD PRESSURE: 128 MMHG | DIASTOLIC BLOOD PRESSURE: 69 MMHG | OXYGEN SATURATION: 99 % | HEART RATE: 57 BPM | WEIGHT: 123.8 LBS | TEMPERATURE: 98.2 F

## 2017-10-19 DIAGNOSIS — R21 RASH AND NONSPECIFIC SKIN ERUPTION: ICD-10-CM

## 2017-10-19 DIAGNOSIS — C83.398 DIFFUSE LARGE B-CELL LYMPHOMA OF EXTRANODAL SITE: Primary | ICD-10-CM

## 2017-10-19 DIAGNOSIS — I82.90 VTE (VENOUS THROMBOEMBOLISM): Primary | ICD-10-CM

## 2017-10-19 DIAGNOSIS — I47.19 ATRIAL TACHYCARDIA (H): ICD-10-CM

## 2017-10-19 DIAGNOSIS — I48.0 PAROXYSMAL ATRIAL FIBRILLATION (H): ICD-10-CM

## 2017-10-19 DIAGNOSIS — C83.30 DIFFUSE LARGE B-CELL LYMPHOMA, UNSPECIFIED BODY REGION (H): ICD-10-CM

## 2017-10-19 LAB
ALBUMIN SERPL-MCNC: 3.3 G/DL (ref 3.4–5)
ALP SERPL-CCNC: 99 U/L (ref 40–150)
ALT SERPL W P-5'-P-CCNC: 63 U/L (ref 0–50)
ANION GAP SERPL CALCULATED.3IONS-SCNC: 5 MMOL/L (ref 3–14)
ANISOCYTOSIS BLD QL SMEAR: SLIGHT
AST SERPL W P-5'-P-CCNC: 39 U/L (ref 0–45)
BASOPHILS # BLD AUTO: 0 10E9/L (ref 0–0.2)
BASOPHILS NFR BLD AUTO: 0.9 %
BILIRUB SERPL-MCNC: 0.4 MG/DL (ref 0.2–1.3)
BUN SERPL-MCNC: 15 MG/DL (ref 7–30)
CALCIUM SERPL-MCNC: 9.1 MG/DL (ref 8.5–10.1)
CHLORIDE SERPL-SCNC: 107 MMOL/L (ref 94–109)
CO2 SERPL-SCNC: 30 MMOL/L (ref 20–32)
CREAT SERPL-MCNC: 0.67 MG/DL (ref 0.52–1.04)
DIFFERENTIAL METHOD BLD: ABNORMAL
EOSINOPHIL # BLD AUTO: 0.1 10E9/L (ref 0–0.7)
EOSINOPHIL NFR BLD AUTO: 4.3 %
ERYTHROCYTE [DISTWIDTH] IN BLOOD BY AUTOMATED COUNT: 16.6 % (ref 10–15)
GFR SERPL CREATININE-BSD FRML MDRD: >90 ML/MIN/1.7M2
GLUCOSE SERPL-MCNC: 94 MG/DL (ref 70–99)
HCT VFR BLD AUTO: 32.2 % (ref 35–47)
HGB BLD-MCNC: 10.6 G/DL (ref 11.7–15.7)
LYMPHOCYTES # BLD AUTO: 0.6 10E9/L (ref 0.8–5.3)
LYMPHOCYTES NFR BLD AUTO: 18.3 %
MACROCYTES BLD QL SMEAR: PRESENT
MCH RBC QN AUTO: 39.1 PG (ref 26.5–33)
MCHC RBC AUTO-ENTMCNC: 32.9 G/DL (ref 31.5–36.5)
MCV RBC AUTO: 119 FL (ref 78–100)
MONOCYTES # BLD AUTO: 0.3 10E9/L (ref 0–1.3)
MONOCYTES NFR BLD AUTO: 8.7 %
NEUTROPHILS # BLD AUTO: 2.2 10E9/L (ref 1.6–8.3)
NEUTROPHILS NFR BLD AUTO: 67.8 %
PLATELET # BLD AUTO: 122 10E9/L (ref 150–450)
POTASSIUM SERPL-SCNC: 3.8 MMOL/L (ref 3.4–5.3)
PROT SERPL-MCNC: 6 G/DL (ref 6.8–8.8)
RBC # BLD AUTO: 2.71 10E12/L (ref 3.8–5.2)
SODIUM SERPL-SCNC: 142 MMOL/L (ref 133–144)
WBC # BLD AUTO: 3.2 10E9/L (ref 4–11)

## 2017-10-19 PROCEDURE — 99214 OFFICE O/P EST MOD 30 MIN: CPT | Mod: ZP | Performed by: PHYSICIAN ASSISTANT

## 2017-10-19 PROCEDURE — 40000556 ZZH STATISTIC PERIPHERAL IV START W US GUIDANCE: Mod: ZF

## 2017-10-19 PROCEDURE — 36415 COLL VENOUS BLD VENIPUNCTURE: CPT

## 2017-10-19 PROCEDURE — 96377 APPLICATON ON-BODY INJECTOR: CPT | Mod: 59

## 2017-10-19 PROCEDURE — 85025 COMPLETE CBC W/AUTO DIFF WBC: CPT | Performed by: INTERNAL MEDICINE

## 2017-10-19 PROCEDURE — 25000128 H RX IP 250 OP 636: Mod: ZF | Performed by: INTERNAL MEDICINE

## 2017-10-19 PROCEDURE — 96411 CHEMO IV PUSH ADDL DRUG: CPT

## 2017-10-19 PROCEDURE — 96375 TX/PRO/DX INJ NEW DRUG ADDON: CPT

## 2017-10-19 PROCEDURE — 96415 CHEMO IV INFUSION ADDL HR: CPT

## 2017-10-19 PROCEDURE — 96417 CHEMO IV INFUS EACH ADDL SEQ: CPT

## 2017-10-19 PROCEDURE — 96367 TX/PROPH/DG ADDL SEQ IV INF: CPT

## 2017-10-19 PROCEDURE — 80053 COMPREHEN METABOLIC PANEL: CPT | Performed by: INTERNAL MEDICINE

## 2017-10-19 PROCEDURE — 96413 CHEMO IV INFUSION 1 HR: CPT

## 2017-10-19 PROCEDURE — 25000132 ZZH RX MED GY IP 250 OP 250 PS 637: Mod: ZF | Performed by: INTERNAL MEDICINE

## 2017-10-19 RX ORDER — CETIRIZINE HYDROCHLORIDE 10 MG/1
10 TABLET ORAL DAILY
COMMUNITY
End: 2017-11-07

## 2017-10-19 RX ORDER — FLUCONAZOLE 100 MG/1
100 TABLET ORAL DAILY
Qty: 30 TABLET | Refills: 1 | Status: SHIPPED | OUTPATIENT
Start: 2017-10-19 | End: 2017-12-18

## 2017-10-19 RX ORDER — POTASSIUM CHLORIDE 1500 MG/1
20 TABLET, EXTENDED RELEASE ORAL DAILY
Qty: 30 TABLET | Refills: 0 | Status: SHIPPED | OUTPATIENT
Start: 2017-10-19 | End: 2017-11-15 | Stop reason: DRUGHIGH

## 2017-10-19 RX ORDER — PREDNISONE 50 MG/1
50 TABLET ORAL 2 TIMES DAILY
Qty: 10 TABLET | Refills: 0 | Status: SHIPPED | OUTPATIENT
Start: 2017-10-19 | End: 2017-10-24

## 2017-10-19 RX ORDER — DIGOXIN 125 MCG
125 TABLET ORAL DAILY
Qty: 30 TABLET | Refills: 0 | Status: SHIPPED | OUTPATIENT
Start: 2017-10-19 | End: 2017-11-15

## 2017-10-19 RX ORDER — ACETAMINOPHEN 325 MG/1
650 TABLET ORAL ONCE
Status: COMPLETED | OUTPATIENT
Start: 2017-10-19 | End: 2017-10-19

## 2017-10-19 RX ORDER — PALONOSETRON 0.05 MG/ML
0.25 INJECTION, SOLUTION INTRAVENOUS ONCE
Status: COMPLETED | OUTPATIENT
Start: 2017-10-19 | End: 2017-10-19

## 2017-10-19 RX ORDER — ATENOLOL 25 MG/1
25 TABLET ORAL 2 TIMES DAILY
Qty: 60 TABLET | Refills: 1 | Status: SHIPPED | OUTPATIENT
Start: 2017-10-19 | End: 2017-12-18

## 2017-10-19 RX ORDER — ACYCLOVIR 400 MG/1
400 TABLET ORAL 2 TIMES DAILY
Qty: 60 TABLET | Refills: 1 | Status: SHIPPED | OUTPATIENT
Start: 2017-10-19 | End: 2017-12-18

## 2017-10-19 RX ORDER — DIPHENHYDRAMINE HCL 25 MG
50 CAPSULE ORAL ONCE
Status: COMPLETED | OUTPATIENT
Start: 2017-10-19 | End: 2017-10-19

## 2017-10-19 RX ADMIN — VINCRISTINE SULFATE 2 MG: 1 INJECTION, SOLUTION INTRAVENOUS at 08:40

## 2017-10-19 RX ADMIN — RITUXIMAB 560 MG: 10 INJECTION, SOLUTION INTRAVENOUS at 09:41

## 2017-10-19 RX ADMIN — PEGFILGRASTIM 6 MG: KIT SUBCUTANEOUS at 11:36

## 2017-10-19 RX ADMIN — PALONOSETRON HYDROCHLORIDE 0.25 MG: 0.25 INJECTION INTRAVENOUS at 07:59

## 2017-10-19 RX ADMIN — DOXORUBICIN HYDROCHLORIDE 75 MG: 2 INJECTION, SOLUTION INTRAVENOUS at 08:32

## 2017-10-19 RX ADMIN — SODIUM CHLORIDE 150 MG: 9 INJECTION, SOLUTION INTRAVENOUS at 08:01

## 2017-10-19 RX ADMIN — ACETAMINOPHEN 650 MG: 325 TABLET ORAL at 07:58

## 2017-10-19 RX ADMIN — SODIUM CHLORIDE 250 ML: 9 INJECTION, SOLUTION INTRAVENOUS at 07:57

## 2017-10-19 RX ADMIN — DIPHENHYDRAMINE HYDROCHLORIDE 50 MG: 25 CAPSULE ORAL at 07:58

## 2017-10-19 RX ADMIN — CYCLOPHOSPHAMIDE 1125 MG: 1 INJECTION, POWDER, FOR SOLUTION INTRAVENOUS; ORAL at 08:47

## 2017-10-19 ASSESSMENT — PAIN SCALES - GENERAL: PAINLEVEL: NO PAIN (0)

## 2017-10-19 NOTE — PATIENT INSTRUCTIONS
Contact Numbers    [facility] [place]: [phone]  [facility] [place] Triage:  [phone]    Call triage with chills and/or temperature greater than or equal to 100.5, uncontrolled nausea/vomiting, diarrhea, constipation, dizziness, shortness of breath, chest pain, bleeding, unexplained bruising, or any new/concerning symptoms, questions/concerns.     If you are having any concerning symptoms or wish to speak to a provider before your next infusion visit, please call your care coordinator or triage to notify them so we can adequately serve you.       After Hours: [phone]    If after hours, weekends, or holidays, call main hospital  and ask for Oncology doctor on call.             October 2017 Sunday Monday Tuesday Wednesday Thursday Friday Saturday   1     2     3     4     [facility]   12:00 PM   (15 min.)    [facility]   [facility]     [facility] ONC INFUSION 120   12:30 PM   (120 min.)    ONCOLOGY INFUSION   [facility] 5     6     7       8     9     PET ONCOLOGY WHOLE BODY    7:30 AM   (45 min.)   UUPET1   [facility] PET CT     CT SOFT TISSUE NECK W    7:40 AM   (30 min.)   UUCT3   [facility] PET CT 10     11     [facility] DRAW    6:30 AM   (15 min.)    [facility]   [facility]     [facility] ONC INFUSION 360    7:00 AM   (360 min.)    ONCOLOGY INFUSION   [facility] ONC RETURN   11:30 AM   (30 min.)   Lenore Rodriguez MD   [facility] Blood and Marrow Transplant 12     13     14       15     16     [facility] DRAW    9:15 AM   (15 min.)    [facility] DRAW   [facility] Draw     US VENOUS   10:00 AM   (30 min.)   UCUSV2   [facility] US 17     18     19     [facility] DRAW    6:30 AM   (15 min.)    [facility] DRAW   [facility] Draw     [facility] ONC INFUSION 360    7:00 AM   (360 min.)    ONCOLOGY INFUSION   [facility]     [facility] RETURN    1:45 PM    (60 min.)   Stephanie Fagan PA-C [facility] 20     21       22     23     24     25     26     27     28       29     30     [facility] NEW    9:15 AM   (30 min.)   Archana Green PA-C   [facility] for Prostate and Urologic Cancers 31 November 2017 Sunday Monday Tuesday Wednesday Thursday Friday Saturday                  1     2     3     4       5     6     7     8     [facility] DRAW    7:30 AM   (15 min.)    [facility] DRAW   [facility] Draw     [facility] ONC INFUSION 360    8:00 AM   (360 min.)    ONCOLOGY INFUSION   [facility] 9     10     11       12     13     14     15     16     17     18       19     20     21     22     23     24     25  Happy Birthday!       26     27     28     29     [facility] DRAW    8:00 AM   (15 min.)    [facility] DRAW   [facility]     [facility] ONC INFUSION 360    8:30 AM   (360 min.)    ONCOLOGY INFUSION   [facility] 30                           Recent Results (from the past 24 hour(s))   CBC with platelets differential    Collection Time: 10/19/17  6:59 AM   Result Value Ref Range    WBC 3.2 (L) 4.0 - 11.0 10e9/L    RBC Count 2.71 (L) 3.8 - 5.2 10e12/L    Hemoglobin 10.6 (L) 11.7 - 15.7 g/dL    Hematocrit 32.2 (L) 35.0 - 47.0 %     (H) 78 - 100 fl    MCH 39.1 (H) 26.5 - 33.0 pg    MCHC 32.9 31.5 - 36.5 g/dL    RDW 16.6 (H) 10.0 - 15.0 %    Platelet Count 122 (L) 150 - 450 10e9/L    Diff Method Manual Differential     % Neutrophils 67.8 %    % Lymphocytes 18.3 %    % Monocytes 8.7 %    % Eosinophils 4.3 %    % Basophils 0.9 %    Absolute Neutrophil 2.2 1.6 - 8.3 10e9/L    Absolute Lymphocytes 0.6 (L) 0.8 - 5.3 10e9/L    Absolute Monocytes 0.3 0.0 - 1.3 10e9/L    Absolute Eosinophils 0.1 0.0 - 0.7 10e9/L    Absolute Basophils 0.0 0.0 - 0.2 10e9/L    Anisocytosis Slight     Macrocytes Present    Comprehensive metabolic panel     Collection Time: 10/19/17  6:59 AM   Result Value Ref Range    Sodium 142 133 - 144 mmol/L    Potassium 3.8 3.4 - 5.3 mmol/L    Chloride 107 94 - 109 mmol/L    Carbon Dioxide 30 20 - 32 mmol/L    Anion Gap 5 3 - 14 mmol/L    Glucose 94 70 - 99 mg/dL    Urea Nitrogen 15 7 - 30 mg/dL    Creatinine 0.67 0.52 - 1.04 mg/dL    GFR Estimate >90 >60 mL/min/1.7m2    GFR Estimate If Black >90 >60 mL/min/1.7m2    Calcium 9.1 8.5 - 10.1 mg/dL    Bilirubin Total 0.4 0.2 - 1.3 mg/dL    Albumin 3.3 (L) 3.4 - 5.0 g/dL    Protein Total 6.0 (L) 6.8 - 8.8 g/dL    Alkaline Phosphatase 99 40 - 150 U/L    ALT 63 (H) 0 - 50 U/L    AST 39 0 - 45 U/L

## 2017-10-19 NOTE — Clinical Note
10/19/2017       RE: Amelia Michel  [address]  [place] [address]     Dear Colleague,    Thank you for referring your patient, Amelia Michel, to the [facility]. Please see a copy of my visit note below.    No notes on file    Again, thank you for allowing me to participate in the care of your patient.      Sincerely,    Stephanie Fagan PA-C

## 2017-10-19 NOTE — NURSING NOTE
Chief Complaint   Patient presents with     Blood Draw     labs drawn via venipuncture     /69 (BP Location: Right arm, Patient Position: Chair, Cuff Size: Adult Regular)  Pulse 57  Temp 98.2  F (36.8  C) (Oral)  Wt 56.2 kg (123 lb 12.8 oz)  SpO2 99%  BMI 25.87 kg/m2    Vitals taken.  Blood collected from right antecub venipuncture. Pt tolerated well. Pt checked in for next appointment.    Kemi Guadarrama RN

## 2017-10-19 NOTE — LETTER
10/19/2017      RE: Amelia Michel  [address]  [facility] [id number]       Heme/onc visit note  October 19, 2017    Reason for visit: follow up DLBCL, here for cycle 4 R-CHOP    Cancer history: Amelia Michel is a 56 year old female with DLBCL. She had a complicated diagnosis as below:    1. History of Rheumatoid Arthritis status post long term treatment with methotrexate with recent significant complicated course with cardiac arrest, admission with hypotension, sepsis, ICU stay and intubation with subsequent additional readmission from TCU in 6/2017 for FUO with extensive work-up with eventual finding of progression cytopenias with eventual lymphoma diagnosis  2. Bone Marrow Biopsy: 7/12/2017: Highly suspicious for involvement by B cell lymphoma with foci of large atypical CD20+ cells  3. PET CT 7/19/2017: Extensive activity: Right paratracheal lesion with SUV 28, many liver lesions with SUV 15, cardiac lesion intraarterial septum, numerous bone lesions.  4. 7/21 Liver Biopsy: Consistent with Diffuse Large B Cell Lymphoma non-germinal center phenotype  -- FISH for myc, bcl2, bcl6 negative for double-hit lymphoma  5. IPI based on Age < 60 (0 points) + PS 2 (1 + point) + Stage IV Disease (1 point) + Extranodal disease > 1 site (1 point) + elevated LDH (1 point) = 4 Poor Risk Disease  6. Cycle 1 given as R-EPOCH Day 1 = 7/27/2017  -- complications of transfusion dependence and need for acute rehab placement (likely more result of extensive hospital stays)  - LP negative for CNS involvement 8/23/2017  7. Cycle #2: Transition to R-CHOP Day 1 = 8/22/2017  - Day 15 high dose MTX for CNS prophylaxis  - complicated by significant fluid overload and PICC associated DVT  8. Cycle #3 Day 1 = 9/20/2017  9. PET/CT 10/9 shows a complete remission by Lugano criteria.     Interval history: Amelia feels well today. Only new issue is a 10 day mild itchy rash on face and upper trunk. No new drugs, topicals, etc. She has  sensitive skin. It got better today with steroid pre-med. No breathing changes or mucus membrane involvement.     She would like to start ppx lovenox as per conversation with Dr. Rodriguez.     Review Of Systems  General: No fevers, chills, night sweats. Weight stable.   Heme: No bruising or bleeding  HEENT: Chronic gum irritation from a tooth. She uses s/s and magic mouthwash  CV/pulm: No chest pain cough, dyspnea  GI: No nausea, vomiting, diarrhea, constipation, abdominal pain  : No frequency, urgency, dysuria  Musculoskeletal: No bone pain  Extremities: No lower extremity edema.     Past medical history:  Patient Active Problem List   Diagnosis     HTN (hypertension)     Primary pulmonary hypertension (H)     Hyperlipidemia LDL goal <130     Other rheumatoid arthritis with rheumatoid factor of multiple sites     Lymphedema of both lower extremities     Atrial tachycardia (H)     Cardiogenic shock (H)     Acute respiratory failure with hypoxia (H)     Hypertension     Critical illness myopathy     Sepsis (H)     Wound of left upper extremity     Diffuse large B-cell lymphoma of extranodal site (H)     Diffuse large B cell lymphoma (H)     Febrile neutropenia (H)     Physical deconditioning     Health Care Home     DLBCL (diffuse large B cell lymphoma) (H)     H/O cardiac arrest       Medications:    Current Outpatient Prescriptions on File Prior to Visit:  predniSONE (DELTASONE) 50 MG tablet Take 1 tablet (50 mg) by mouth 2 times daily for 5 days Take on Days 1 through 5. Take first dose in AM prior to chemotherapy.   cetirizine (ZYRTEC) 10 MG tablet Take 10 mg by mouth daily   DiphenhydrAMINE HCl (BENADRYL ALLERGY PO) Take 50 mg by mouth   HYDROCORTISONE PO Take 100 mg by mouth IV   magic mouthwash suspension (diphenhydrAMINE, lidocaine, aluminum-magnesium & simethicone) Swish and swallow 10 mLs in mouth 2 times daily   predniSONE (DELTASONE) 5 MG tablet Take 1 tablet (5 mg) by mouth daily (Patient not taking:  Reported on 10/19/2017)   [DISCONTINUED] acyclovir (ZOVIRAX) 400 MG tablet Take 1 tablet (400 mg) by mouth 2 times daily   [DISCONTINUED] atenolol (TENORMIN) 25 MG tablet Take 1 tablet (25 mg) by mouth 2 times daily   [DISCONTINUED] digoxin (LANOXIN) 125 MCG tablet Take 1 tablet (125 mcg) by mouth daily   traMADol (ULTRAM) 50 MG tablet Take 1 tablet (50 mg) by mouth every 6 hours as needed for moderate pain (Patient not taking: Reported on 10/19/2017)   acetaminophen (TYLENOL) 325 MG tablet Take 2 tablets (650 mg) by mouth every 4 hours as needed for mild pain, fever or headaches   potassium & sodium phosphates (NEUTRA-PHOS) 280-160-250 MG Packet Take 1 packet by mouth 2 times daily   ondansetron (ZOFRAN-ODT) 8 MG ODT tab Take 1 tablet (8 mg) by mouth every 8 hours as needed (Patient not taking: Reported on 10/19/2017)   calcium polycarbophil (FIBERCON) 625 MG tablet Take 1 tablet by mouth 2 times daily   gabapentin (NEURONTIN) 100 MG capsule Take 2 capsules (200 mg) by mouth 3 times daily   calcium-vitamin D (CALTRATE) 600-400 MG-UNIT per tablet Take 2 tablets by mouth every morning   multivitamin, therapeutic with minerals (THERA-VIT-M) TABS tablet Take 1 tablet by mouth daily   ascorbic acid 500 MG TABS Take 1 tablet (500 mg) by mouth daily     Current Facility-Administered Medications on File Prior to Visit:  [COMPLETED] 0.9% sodium chloride BOLUS   [COMPLETED] fosaprepitant (EMEND) 150 mg in NaCl 0.9 % intermittent infusion   [COMPLETED] palonosetron (ALOXI) injection 0.25 mg   [COMPLETED] acetaminophen (TYLENOL) tablet 650 mg   [COMPLETED] diphenhydrAMINE (BENADRYL) capsule 50 mg   [COMPLETED] pegfilgrastim (NEULASTA Onpro Kit) On-body injector 6 mg   [COMPLETED] cyclophosphamide (CYTOXAN) 1,125 mg in NaCl 0.9 % 331 mL CHEMOTHERAPY   [COMPLETED] vinCRIStine (ONCOVIN) 2 mg in NaCl 0.9 % 30 mL CHEMOTHERAPY   [COMPLETED] DOXOrubicin (ADRIAMYCIN) 75 mg in 37.5 mL CHEMOTHERAPY   [COMPLETED] riTUXimab (RITUXAN) 560  mg in NaCl 0.9 % 560 mL       Allergy:     Allergies   Allergen Reactions     Blood Transfusion Related (Informational Only) Hives     Hives with platelets. Give benadryl premedication.     Metoprolol Other (See Comments)     Pt and  report that metoprolol does not work for her and also reports feeling unwell with this medication. She has been able to tolerate atenolol, which as worked in controlling her HR.      No Clinical Screening - See Comments      Penicillin Allergy Skin Test not performed, see antimicrobial management team progress note 7/5/17.       Penicillins      Tape [Adhesive Tape] Rash       Physical exam  /69 (BP Location: Right arm, Patient Position: Chair, Cuff Size: Adult Regular)  Pulse 57  Temp 98.2  F (36.8  C) (Oral)  Wt 56.2 kg (123 lb 12.8 oz)  SpO2 99%  BMI 25.87 kg/m2  Wt Readings from Last 4 Encounters:   10/19/17 56.2 kg (123 lb 12.8 oz)   10/11/17 56 kg (123 lb 6.4 oz)   10/04/17 56.1 kg (123 lb 9.6 oz)   09/29/17 56.6 kg (124 lb 11.2 oz)     General: No acute distress, sitting in infusion room with her   HEENT: Sclera anicteric. Oral mucosa pink and moist.  No mucositis or thrush  Lymph: No lymphadenopathy in neck, supraclavicular, and axillary areas  Heart: Regular, rate, and rhythm  Lungs: Clear to ascultation bilaterally  Abdomen: Positive bowel sounds. Soft, non-distended, non-tender. No organomegaly or mass.   Extremities: no lower extremity edema  Neuro: Cranial nerves grossly intact  Rash: Fine non specific papular erythematous dry appearing rash (very subtle) on chin and posterior trunk.   Vascular access: port    Labs:  Results for orders placed or performed in visit on 10/19/17 (from the past 24 hour(s))   CBC with platelets differential   Result Value Ref Range    WBC 3.2 (L) 4.0 - 11.0 10e9/L    RBC Count 2.71 (L) 3.8 - 5.2 10e12/L    Hemoglobin 10.6 (L) 11.7 - 15.7 g/dL    Hematocrit 32.2 (L) 35.0 - 47.0 %     (H) 78 - 100 fl    MCH 39.1 (H)  26.5 - 33.0 pg    MCHC 32.9 31.5 - 36.5 g/dL    RDW 16.6 (H) 10.0 - 15.0 %    Platelet Count 122 (L) 150 - 450 10e9/L    Diff Method Manual Differential     % Neutrophils 67.8 %    % Lymphocytes 18.3 %    % Monocytes 8.7 %    % Eosinophils 4.3 %    % Basophils 0.9 %    Absolute Neutrophil 2.2 1.6 - 8.3 10e9/L    Absolute Lymphocytes 0.6 (L) 0.8 - 5.3 10e9/L    Absolute Monocytes 0.3 0.0 - 1.3 10e9/L    Absolute Eosinophils 0.1 0.0 - 0.7 10e9/L    Absolute Basophils 0.0 0.0 - 0.2 10e9/L    Anisocytosis Slight     Macrocytes Present    Comprehensive metabolic panel   Result Value Ref Range    Sodium 142 133 - 144 mmol/L    Potassium 3.8 3.4 - 5.3 mmol/L    Chloride 107 94 - 109 mmol/L    Carbon Dioxide 30 20 - 32 mmol/L    Anion Gap 5 3 - 14 mmol/L    Glucose 94 70 - 99 mg/dL    Urea Nitrogen 15 7 - 30 mg/dL    Creatinine 0.67 0.52 - 1.04 mg/dL    GFR Estimate >90 >60 mL/min/1.7m2    GFR Estimate If Black >90 >60 mL/min/1.7m2    Calcium 9.1 8.5 - 10.1 mg/dL    Bilirubin Total 0.4 0.2 - 1.3 mg/dL    Albumin 3.3 (L) 3.4 - 5.0 g/dL    Protein Total 6.0 (L) 6.8 - 8.8 g/dL    Alkaline Phosphatase 99 40 - 150 U/L    ALT 63 (H) 0 - 50 U/L    AST 39 0 - 45 U/L       Assessment and plan:  Amelia Michel is a 56 year old woman with history of rhematoid arthritis and recent diagnosis of stage IVB high risk aggressive Diffuse large B cell lymphoma     1. Lymphoma: Got cycle #1 in the hospital and I chose R-EPOCH for infusional nature due to possible intracardiac involvement and also extensive disease to allow for slower lymphoma kill. Tolerated this well, aside from significant cytopenias (which were present at diagnosis). Then transitioned to R-CHOP to finish out a total of 6 cycles of chemotherapy (1 R-EPOCH and 5 R-CHOP) with initially planned for High Dose MTX on Day 15 of cycle 2,4, and 6 due to high IPI.   -- Amelia had significant toxicity with the high dose methotrexate with PICC associated clot, significant volume  overload, increased neuropathy and thus moving forward Dr Rodriguez is planning on doing a couple of IT chemo as the prophylaxis given the risk/benefit ratio.  -Cycle 4 R-CHOP today (delayed 5 days for count recovery)  -Schedule XR lumbar puncture with KEVIN administering chemo 11/7 or 8 if counts are recovered. Advised her to hold lovenox before the procedure. Will also schedule her pre chemo assessment that day with labs.     2. Rash: non-specific and improved with today's premeds. Advised Aquaphor and PO antihistamine prn.      3. ID: Remains on acyclovir and fluconazole. Abx when ANC<1     4. Rheumatoid arthritis: is on prednisone 5 mg daily. Will continue this in between her chemo sessions.      5. CV: A. Fib. On digoxin and atenolol. Now on lovenox for DVT so anticoagulated. Will need to start aspirin if the lovenox is stopped     5. PICC Associated RUE DVT: she was on therapetic lovenox and US 10/16 showed the clot cleared. She wishes to take ppx dose lovenox. Will hold this before procedures of if plt<50K.     Over 50% of 25 min visit was spent counseling and coordinating care

## 2017-10-19 NOTE — MR AVS SNAPSHOT
After Visit Summary   10/19/2017    Amelia Michel    MRN: [id number]           Patient Information     Date Of Birth          1960        Visit Information        Provider Department      10/19/2017 7:00 AM  11 ATC;  ONCOLOGY INFUSION [facility]        Today's Diagnoses     Diffuse large B-cell lymphoma of extranodal site (H)    -  1      Care Instructions    Contact Numbers    [facility] Main Line: [phone]  [facility] Triage:  [phone]    Call triage with chills and/or temperature greater than or equal to 100.5, uncontrolled nausea/vomiting, diarrhea, constipation, dizziness, shortness of breath, chest pain, bleeding, unexplained bruising, or any new/concerning symptoms, questions/concerns.     If you are having any concerning symptoms or wish to speak to a provider before your next infusion visit, please call your care coordinator or triage to notify them so we can adequately serve you.       After Hours: [phone]    If after hours, weekends, or holidays, call main hospital  and ask for Oncology doctor on call.             October 2017 Sunday Monday Tuesday Wednesday Thursday Friday Saturday   1     2     3     4     [facility] LAB DRAW   12:00 PM   (15 min.)   [facility] LAB DRAW   [facility] Lab Draw     [facility] ONC INFUSION 120   12:30 PM   (120 min.)    ONCOLOGY INFUSION   [facility] 5     6     7       8     9     PET ONCOLOGY WHOLE BODY    7:30 AM   (45 min.)   UUPET1   [facility] PET CT     CT SOFT TISSUE NECK W    7:40 AM   (30 min.)   UUCT3   [facility] PET CT 10     11     [facility]    6[facility]   (15 min.)   [facility]   [facility] Lab Draw     [facility] ONC INFUSION 360    7:00 AM   (360 min.)    ONCOLOGY INFUSION   [facility] ONC RETURN   11:30 AM   (30 min.)   Lenore Rodriguez MD   [facility] Blood [facility] Marrow Transplant 12     13     14       15     16     [facility]  [facility] LAB DRAW    9:15 AM   (15 min.)    [facility] LAB DRAW   [facility] Lab Draw     US VENOUS   10:00 AM   (30 min.)   UCUSV2   [facility] US 17     18     19     [facility] DRAW    6:30 AM   (15 min.)    [facility] LAB DRAW   [facility] Draw     [facility] ONC INFUSION 360    7:00 AM   (360 min.)    ONCOLOGY INFUSION   [facility]     [facility] RETURN    1:45 PM   (60 min.)   Stephanie Fagan PA-C M [facility] 20     21       22     23     24     25     26     27     28       29     30     [facility] NEW    9:15 AM   (30 min.)   Archana Green PA-C M [facility] for Prostate and Urologic Cancers 31 November 2017 Sunday Monday Tuesday Wednesday Thursday Friday Saturday                  1     2     3     4       5     6     7     8     [facility] LAB DRAW    7:30 AM   (15 min.)    [facility] LAB DRAW   [facility] Lab Draw     [facility] ONC INFUSION 360    8:00 AM   (360 min.)    ONCOLOGY INFUSION   [facility] 9     10     11       12     13     14     15     16     17     18       19     20     21     22     23     24     25  Happy Birthday!       26     27     28     29     [facility] LAB DRAW    8:00 AM   (15 min.)    [facility] LAB DRAW   [facility] Lab Draw     [facility] ONC INFUSION 360    8:30 AM   (360 min.)    ONCOLOGY INFUSION   [facility] 30                           Recent Results (from the past 24 hour(s))   CBC with platelets differential    Collection Time: 10/19/17  6:59 AM   Result Value Ref Range    WBC 3.2 (L) 4.0 - 11.0 10e9/L    RBC Count 2.71 (L) 3.8 - 5.2 10e12/L    Hemoglobin 10.6 (L) 11.7 - 15.7 g/dL    Hematocrit 32.2 (L) 35.0 - 47.0 %     (H) 78 - 100 fl    MCH 39.1 (H) 26.5 - 33.0 pg    MCHC 32.9 31.5 - 36.5 g/dL    RDW 16.6 (H) 10.0 - 15.0 %    Platelet Count 122 (L) 150 - 450 10e9/L    Diff Method Manual Differential     %  Neutrophils 67.8 %    % Lymphocytes 18.3 %    % Monocytes 8.7 %    % Eosinophils 4.3 %    % Basophils 0.9 %    Absolute Neutrophil 2.2 1.6 - 8.3 10e9/L    Absolute Lymphocytes 0.6 (L) 0.8 - 5.3 10e9/L    Absolute Monocytes 0.3 0.0 - 1.3 10e9/L    Absolute Eosinophils 0.1 0.0 - 0.7 10e9/L    Absolute Basophils 0.0 0.0 - 0.2 10e9/L    Anisocytosis Slight     Macrocytes Present    Comprehensive metabolic panel    Collection Time: 10/19/17  6:59 AM   Result Value Ref Range    Sodium 142 133 - 144 mmol/L    Potassium 3.8 3.4 - 5.3 mmol/L    Chloride 107 94 - 109 mmol/L    Carbon Dioxide 30 20 - 32 mmol/L    Anion Gap 5 3 - 14 mmol/L    Glucose 94 70 - 99 mg/dL    Urea Nitrogen 15 7 - 30 mg/dL    Creatinine 0.67 0.52 - 1.04 mg/dL    GFR Estimate >90 >60 mL/min/1.7m2    GFR Estimate If Black >90 >60 mL/min/1.7m2    Calcium 9.1 8.5 - 10.1 mg/dL    Bilirubin Total 0.4 0.2 - 1.3 mg/dL    Albumin 3.3 (L) 3.4 - 5.0 g/dL    Protein Total 6.0 (L) 6.8 - 8.8 g/dL    Alkaline Phosphatase 99 40 - 150 U/L    ALT 63 (H) 0 - 50 U/L    AST 39 0 - 45 U/L                 Follow-ups after your visit        Your next 10 appointments already scheduled     Oct 19, 2017  2:00 PM CDT   (Arrive by 1:45 PM)   Return Visit with Stephanie Fagan PA-C   [facility] ([facility] [place])    [address] [place] [address]  2nd Floor  [facility] [address] [phone]            Oct 30, 2017  9:30 AM CDT   (Arrive by 9:15 AM)   New Patient Visit with Archana Green PA-C   [facility] Prostate and Urologic Cancers ([facility])    [address] [place] [address]  4th Floor  [facility] [address] [phone]            Nov 08, 2017  7:30 AM CST   [facility] Lab Draw with  [facility] LAB DRAW   [facility] Draw ([facility] [place])    [address] [facility]  2nd [facility] [address]   [phone]            Nov 08, 2017  8:00 AM CST   Infusion 360  with UC ONCOLOGY INFUSION, UC 15 ATC   [facility] ([facility])    [address] [facility] [address]  2nd [facility] [address]   [phone]            Nov 29, 2017  8:00 AM CST   [facility] Lab Draw with [facility] LAB DRAW   [facility] Lab Draw ([facility])    [address] [facility] [address]  [address] [facility] [address]   [phone]            Nov 29, 2017  8:30 AM CST   Infusion 360 with UC ONCOLOGY INFUSION, UC 20 ATC   [facility] ([facility])    [address]  [address] [facility] [address]   [phone]            Jan 12, 2018  8:00 AM CST   (Arrive by 7:45 AM)   Return Visit with Pj Cunha MD   [facility] ([facility])    [address]  3rd [facility] [address]   [phone]            Jan 31, 2018 10:00 AM CST   (Arrive by 9:45 AM)   RETURN ARRHYTHMIA with Juan Eaton MD   [facility] ([facility])    [address] [place] [address]  3rd [facility] [address]   [phone]              Future tests that were ordered for you today     Open Future Orders        Priority Expected Expires Ordered    Cell count with differential CSF: Tube 3 Routine 11/7/2017 11/16/2017 10/19/2017    CSF Culture Aerobic Bacterial Routine 11/7/2017 11/16/2017 10/19/2017    Gram stain Routine 11/7/2017 11/16/2017 10/19/2017    Glucose CSF: Tube 1 Routine 11/7/2017 11/16/2017 10/19/2017    Protein total CSF: Tube 1 Routine 11/7/2017 11/16/2017 10/19/2017    *CBC with platelets differential Routine 11/7/2017 11/16/2017 10/19/2017    Comprehensive metabolic panel Routine 11/7/2017 11/16/2017 10/19/2017    Leukemia Lymphoma Evaluation CSF Routine 11/7/2017 11/16/2017 10/19/2017    X-ray Lumbar punc for intrathecal chemo admin Routine 11/7/2017 10/19/2018 10/19/2017            Who to contact     If you have  questions or need follow up information about today's clinic visit or your schedule please contact [facility] directly at [phone].  Normal or non-critical lab and imaging results will be communicated to you by MyChart, letter or phone within 4 business days after the clinic has received the results. If you do not hear from us within 7 days, please contact the clinic through [employer] or phone. If you have a critical or abnormal lab result, we will notify you by phone as soon as possible.  Submit refill requests through [employer] or call your pharmacy and they will forward the refill request to us. Please allow 3 business days for your refill to be completed.          Additional Information About Your Visit        [employer] Information     [employer] gives you secure access to your electronic health record. If you see a primary care provider, you can also send messages to your care team and make appointments. If you have questions, please call your primary care clinic.  If you do not have a primary care provider, please call [phone] and they will assist you.        Care EveryWhere ID     This is your Care EveryWhere ID. This could be used by other organizations to access your [place] medical records  [id number]         Blood Pressure from Last 3 Encounters:   10/11/17 120/63   10/04/17 134/53   09/29/17 131/69    Weight from Last 3 Encounters:   10/11/17 56 kg (123 lb 6.4 oz)   10/04/17 56.1 kg (123 lb 9.6 oz)   09/29/17 56.6 kg (124 lb 11.2 oz)              We Performed the Following     CBC with platelets differential     Comprehensive metabolic panel          Today's Medication Changes          These changes are accurate as of: 10/19/17  1:18 PM.  If you have any questions, ask your nurse or doctor.               Start taking these medicines.        Dose/Directions    enoxaparin 40 MG/0.4ML injection   Commonly known as:  LOVENOX   Used for:  VTE (venous thromboembolism), Paroxysmal atrial  fibrillation (H)   Started by:  Stephanie Fagan PA-C        Dose:  40 mg   Inject 0.4 mLs (40 mg) Subcutaneous daily   Quantity:  30 Syringe   Refills:  0         These medicines have changed or have updated prescriptions.        Dose/Directions    potassium chloride SA 20 MEQ CR tablet   Commonly known as:  K-DUR/KLOR-CON M   This may have changed:  how much to take   Used for:  Diffuse large B-cell lymphoma, unspecified body region (H)   Changed by:  Stephanie Fagan PA-C        Dose:  20 mEq   Take 1 tablet (20 mEq) by mouth daily while taking furosemide (Lasix). You can decrease potassium to 20 mEq daily once off Lasix.   Quantity:  30 tablet   Refills:  0       * predniSONE 5 MG tablet   Commonly known as:  DELTASONE   This may have changed:  Another medication with the same name was added. Make sure you understand how and when to take each.   Used for:  Diffuse large B-cell lymphoma, unspecified body region (H)   Changed by:  Lenore Rodriguez MD        Dose:  5 mg   Take 1 tablet (5 mg) by mouth daily   Quantity:  30 tablet   Refills:  0       * predniSONE 50 MG tablet   Commonly known as:  DELTASONE   This may have changed:  You were already taking a medication with the same name, and this prescription was added. Make sure you understand how and when to take each.   Used for:  Diffuse large B-cell lymphoma of extranodal site (H)        Dose:  50 mg   Take 1 tablet (50 mg) by mouth 2 times daily for 5 days Take on Days 1 through 5. Take first dose in AM prior to chemotherapy.   Quantity:  10 tablet   Refills:  0       * Notice:  This list has 2 medication(s) that are the same as other medications prescribed for you. Read the directions carefully, and ask your doctor or other care provider to review them with you.         Where to get your medicines      These medications were sent to [address] [place] [address]  [place] [address]    Hours:  TRANSPLANT PHONE NUMBER [phone] Phone:  [phone]     acyclovir 400 MG tablet    atenolol 25 MG tablet    digoxin 125 MCG tablet    enoxaparin 40 MG/0.4ML injection    fluconazole 100 MG tablet    potassium chloride SA 20 MEQ CR tablet    predniSONE 50 MG tablet                Primary Care Provider Office Phone # Fax #    Comfort Sabillon [phone] [address] [facility] [address]        Equal Access to Services     [facility] : Hadii aad ku hadasho Soomaali, waaxda luqadaha, qaybta kaalmada adeegyada, waxay idiin hayaan adeeg giaarajose alejandro lamiguel . So [facility] [phone].    ATENCIÓN: Si habla español, tiene a sarmiento disposición servicios gratuitos de asistencia lingüística. [facility] [phone].    We comply with applicable federal civil rights laws and [place] laws. We do not discriminate on the basis of race, color, national origin, age, disability, sex, sexual orientation, or gender identity.            Thank you!     Thank you for choosing [facility]  for your care. Our goal is always to provide you with excellent care. Hearing back from our patients is one way we can continue to improve our services. Please take a few minutes to complete the written survey that you may receive in the mail after your visit with us. Thank you!             Your Updated Medication List - Protect others around you: Learn how to safely use, store and throw away your medicines at www.disposemymeds.org.          This list is accurate as of: 10/19/17  1:18 PM.  Always use your most recent med list.                   Brand Name Dispense Instructions for use Diagnosis    acetaminophen 325 MG tablet    TYLENOL     Take 2 tablets (650 mg) by mouth every 4 hours as needed for mild pain, fever or headaches    Diffuse large B-cell lymphoma of extranodal site (H)       acyclovir 400 MG tablet    ZOVIRAX    60 tablet    Take 1 tablet (400 mg) by mouth 2 times daily     Diffuse large B-cell lymphoma, unspecified body region (H)       ascorbic acid 500 MG Tabs     30 tablet    Take 1 tablet (500 mg) by mouth daily    Diffuse large B-cell lymphoma, unspecified body region (H)       atenolol 25 MG tablet    TENORMIN    60 tablet    Take 1 tablet (25 mg) by mouth 2 times daily    Atrial tachycardia (H)       BENADRYL ALLERGY PO      Take 50 mg by mouth        calcium polycarbophil 625 MG tablet    FIBERCON     Take 1 tablet by mouth 2 times daily        calcium-vitamin D 600-400 MG-UNIT per tablet    CALTRATE    60 tablet    Take 2 tablets by mouth every morning    Diffuse large B-cell lymphoma, unspecified body region (H)       cetirizine 10 MG tablet    zyrTEC     Take 10 mg by mouth daily        digoxin 125 MCG tablet    LANOXIN    30 tablet    Take 1 tablet (125 mcg) by mouth daily    Atrial tachycardia (H)       enoxaparin 40 MG/0.4ML injection    LOVENOX    30 Syringe    Inject 0.4 mLs (40 mg) Subcutaneous daily    VTE (venous thromboembolism), Paroxysmal atrial fibrillation (H)       fluconazole 100 MG tablet    DIFLUCAN    30 tablet    Take 1 tablet (100 mg) by mouth daily    Diffuse large B-cell lymphoma, unspecified body region (H)       gabapentin 100 MG capsule    NEURONTIN    120 capsule    Take 2 capsules (200 mg) by mouth 3 times daily    Critical illness myopathy       HYDROCORTISONE PO      Take 100 mg by mouth IV        lidocaine visc 2% 2.5mL/5mL & maalox/mylanta w/ simeth 2.5mL/5mL & diphenhydrAMINE 5mg/5mL Susp suspension    [facility] Mouthwash [facility]     Swish and swallow 10 mLs in mouth 2 times daily        multivitamin, therapeutic with minerals Tabs tablet     30 each    Take 1 tablet by mouth daily    Diffuse large B-cell lymphoma, unspecified body region (H)       ondansetron 8 MG ODT tab    ZOFRAN-ODT    20 tablet    Take 1 tablet (8 mg) by mouth every 8 hours as needed    Diffuse large B-cell lymphoma of extranodal site (H)       potassium & sodium phosphates  280-160-250 MG Packet    NEUTRA-PHOS    60 packet    Take 1 packet by mouth 2 times daily    Hypophosphatemia       potassium chloride SA 20 MEQ CR tablet    K-DUR/KLOR-CON M    30 tablet    Take 1 tablet (20 mEq) by mouth daily while taking furosemide (Lasix). You can decrease potassium to 20 mEq daily once off Lasix.    Diffuse large B-cell lymphoma, unspecified body region (H)       * predniSONE 5 MG tablet    DELTASONE    30 tablet    Take 1 tablet (5 mg) by mouth daily    Diffuse large B-cell lymphoma, unspecified body region (H)       * predniSONE 50 MG tablet    DELTASONE    10 tablet    Take 1 tablet (50 mg) by mouth 2 times daily for 5 days Take on Days 1 through 5. Take first dose in AM prior to chemotherapy.    Diffuse large B-cell lymphoma of extranodal site (H)       traMADol 50 MG tablet    ULTRAM    30 tablet    Take 1 tablet (50 mg) by mouth every 6 hours as needed for moderate pain    Diffuse large B-cell lymphoma of extranodal site (H)       * Notice:  This list has 2 medication(s) that are the same as other medications prescribed for you. Read the directions carefully, and ask your doctor or other care provider to review them with you.

## 2017-10-19 NOTE — PROGRESS NOTES
Infusion Nursing Note:  Amelia Michel presents today for Cycle 1 Day 1 Rituxan, Doxorubicin, Vincristine, and Cytoxan.    Patient seen and examined by Stephanie TOBIN in infusion.    Intravenous Access:  Peripheral IV placed by vascular access with US    Treatment Conditions:  Lab Results   Component Value Date    HGB 10.6 10/19/2017     Lab Results   Component Value Date    WBC 3.2 10/19/2017      Lab Results   Component Value Date    ANEU 2.2 10/19/2017     Lab Results   Component Value Date     10/19/2017      Lab Results   Component Value Date     10/19/2017                   Lab Results   Component Value Date    POTASSIUM 3.8 10/19/2017           Lab Results   Component Value Date    MAG 1.7 09/11/2017            Lab Results   Component Value Date    CR 0.67 10/19/2017                   Lab Results   Component Value Date    VIKTORIYA 9.1 10/19/2017                Lab Results   Component Value Date    BILITOTAL 0.4 10/19/2017           Lab Results   Component Value Date    ALBUMIN 3.3 10/19/2017                    Lab Results   Component Value Date    ALT 63 10/19/2017           Lab Results   Component Value Date    AST 39 10/19/2017   ECHO/MUGA completed 9/11/17  EF 60-65%.  Results reviewed, labs MET treatment parameters, ok to proceed with treatment.      Note:  Doxorubicin infused into free flowing NS.  Positive blood return q2cc.  PIV site without redness or swelling.  Vincristine infused into free flowing NS.  Positive blood return pre, mid, and post infuison.  PIV site without redness or swelling.    Neulasta Onpro On-Body injector applied to L arm at 1140 with light facing toward elbow.  Writer discussed Neulasta injection would start on 10/20/17 at 1440, approximately 27 hours after application applied today.  Verbal instruction reviewed with patient.  Pt instructed when the dose delivery starts, it will take about 45 minutes to complete.  Pt has had onbody neulasta with previous cycles and had  no further questions.          Post Infusion Assessment:  Patient tolerated infusion without incident.    Discharge Plan:   Stephanie Rylan reviewed prescriptions that need to be refilled and sent scripts downstairs to the pharmacy.  Pt knows that she needs to pick her prednisone up down there as well and how to take it.  Copy of AVS reviewed with patient and/or family.  Patient will return 11/8/17 for cycle 5  Face to Face time: 0.    Ana Lilia Adan RN

## 2017-10-19 NOTE — MR AVS SNAPSHOT
After Visit Summary   10/19/2017    Amelia Michel    MRN: [id number]           Patient Information     Date Of Birth          1960        Visit Information        Provider Department      10/19/2017 2:00 PM Stephanie Fagan PA-C [facility]        Today's Diagnoses     VTE (venous thromboembolism)    -  1    Diffuse large B-cell lymphoma, unspecified body region (H)        Atrial tachycardia (H)        Paroxysmal atrial fibrillation (H)        Rash and nonspecific skin eruption           Follow-ups after your visit        Your next 10 appointments already scheduled     Oct 19, 2017  2:00 PM CDT   (Arrive by 1:45 PM)   Return Visit with CORI Angelo [facility] ([facility])    [address] [place] [address]  [address] [facility] [address]   [phone]            Oct 30, 2017  9:30 AM CDT   (Arrive by 9:15 AM)   New Patient Visit with CORI Arenas [facility] ([facility])    [address] [place] [address]  [address] [facility] [address]   [phone]            Nov 08, 2017  7:30 AM CST   [facility] Draw with [facility] DRAW   [place] [facility] ([facility])    [address]  [address] [facility] [address]   [phone]            Nov 08, 2017  8:00 AM CST   Infusion 360 with UC ONCOLOGY INFUSION, UC 15 ATC   [facility] ([facility])    [address]  [address] [facility] [address]   [phone]            Nov 29, 2017  8:00 AM CST   [facility] Draw with [facility] LAB DRAW   [place] [facility] ([facility])    [address]  [address] [facility] [address]   [phone]            Nov 29, 2017  8:30 AM CST   Infusion 360 with UC ONCOLOGY INFUSION, UC 20 ATC   [facility]  ([facility] [place])    [address] [facility]  2nd [facility] [address]   [phone]            Jan 12, 2018  8:00 AM CST   (Arrive by 7:45 AM)   Return Visit with Pj Cunha MD   [facility]umatology ([facility])    [address] [place] [address]  3rd [facility] [address]   [phone]            Jan 31, 2018 10:00 AM CST   (Arrive by 9:45 AM)   RETURN ARRHYTHMIA with Juan Eaton MD   [facility] ([facility])    [address] [place] [address]  3rd [facility] [address]   [phone]              Future tests that were ordered for you today     Open Future Orders        Priority Expected Expires Ordered    Cell count with differential CSF: Tube 3 Routine 11/7/2017 11/16/2017 10/19/2017    CSF Culture Aerobic Bacterial Routine 11/7/2017 11/16/2017 10/19/2017    Gram stain Routine 11/7/2017 11/16/2017 10/19/2017    Glucose CSF: Tube 1 Routine 11/7/2017 11/16/2017 10/19/2017    Protein total CSF: Tube 1 Routine 11/7/2017 11/16/2017 10/19/2017    *CBC with platelets differential Routine 11/7/2017 11/16/2017 10/19/2017    Comprehensive metabolic panel Routine 11/7/2017 11/16/2017 10/19/2017    Leukemia Lymphoma Evaluation CSF Routine 11/7/2017 11/16/2017 10/19/2017    X-ray Lumbar punc for intrathecal chemo admin Routine 11/7/2017 10/19/2018 10/19/2017            Who to contact     If you have questions or need follow up information about today's clinic visit or your schedule please contact [facility] directly at [phone].  Normal or non-critical lab and imaging results will be communicated to you by MyChart, letter or phone within 4 business days after the clinic has received the results. If you do not hear from us within 7 days, please contact the clinic through MyChart or phone. If you have a critical or abnormal lab result, we will notify you by phone as soon as possible.  Submit refill requests  through [employer] or call your pharmacy and they will forward the refill request to us. Please allow 3 business days for your refill to be completed.          Additional Information About Your Visit        [employer] Information     [employer] gives you secure access to your electronic health record. If you see a primary care provider, you can also send messages to your care team and make appointments. If you have questions, please call your primary care clinic.  If you do not have a primary care provider, please call [phone] and they will assist you.        Care EveryWhere ID     This is your Care EveryWhere ID. This could be used by other organizations to access your [place] medical records  [id number]        Your Vitals Were     Pulse Temperature Pulse Oximetry BMI (Body Mass Index)          57 98.2  F (36.8  C) (Oral) 99% 25.87 kg/m2         Blood Pressure from Last 3 Encounters:   10/19/17 128/69   10/11/17 120/63   10/04/17 134/53    Weight from Last 3 Encounters:   10/19/17 56.2 kg (123 lb 12.8 oz)   10/11/17 56 kg (123 lb 6.4 oz)   10/04/17 56.1 kg (123 lb 9.6 oz)                 Today's Medication Changes          These changes are accurate as of: 10/19/17  1:13 PM.  If you have any questions, ask your nurse or doctor.               Start taking these medicines.        Dose/Directions    enoxaparin 40 MG/0.4ML injection   Commonly known as:  LOVENOX   Used for:  VTE (venous thromboembolism), Paroxysmal atrial fibrillation (H)   Started by:  Stephanie Fagan PA-C        Dose:  40 mg   Inject 0.4 mLs (40 mg) Subcutaneous daily   Quantity:  30 Syringe   Refills:  0         These medicines have changed or have updated prescriptions.        Dose/Directions    potassium chloride SA 20 MEQ CR tablet   Commonly known as:  K-DUR/KLOR-CON M   This may have changed:  how much to take   Used for:  Diffuse large B-cell lymphoma, unspecified body region (H)   Changed by:  Stephanie Fagan PA-C        Dose:  20 mEq   Take  1 tablet (20 mEq) by mouth daily while taking furosemide (Lasix). You can decrease potassium to 20 mEq daily once off Lasix.   Quantity:  30 tablet   Refills:  0       * predniSONE 5 MG tablet   Commonly known as:  DELTASONE   This may have changed:  Another medication with the same name was added. Make sure you understand how and when to take each.   Used for:  Diffuse large B-cell lymphoma, unspecified body region (H)   Changed by:  Lenore Rodriguez MD        Dose:  5 mg   Take 1 tablet (5 mg) by mouth daily   Quantity:  30 tablet   Refills:  0       * predniSONE 50 MG tablet   Commonly known as:  DELTASONE   This may have changed:  You were already taking a medication with the same name, and this prescription was added. Make sure you understand how and when to take each.   Used for:  Diffuse large B-cell lymphoma of extranodal site (H)        Dose:  50 mg   Take 1 tablet (50 mg) by mouth 2 times daily for 5 days Take on Days 1 through 5. Take first dose in AM prior to chemotherapy.   Quantity:  10 tablet   Refills:  0       * Notice:  This list has 2 medication(s) that are the same as other medications prescribed for you. Read the directions carefully, and ask your doctor or other care provider to review them with you.         Where to get your medicines      These medications were sent to [address] [address]    Hours:  TRANSPLANT PHONE NUMBER [phone] Phone:  [phone]     acyclovir 400 MG tablet    atenolol 25 MG tablet    digoxin 125 MCG tablet    enoxaparin 40 MG/0.4ML injection    fluconazole 100 MG tablet    potassium chloride SA 20 MEQ CR tablet    predniSONE 50 MG tablet                Primary Care Provider Office Phone # Fax #    Comfort Sabillon [phone] [address] [place] [address]        Equal Access to Services     CATINA HEAD AH: Kay mcguire  prachi Flores, watashda luqadaha, qaybta kaalmada catalino, durga smith franckja giaodilia ladariusall henny. So [facility] [phone].    ATENCIÓN: Si harpreetla matt, tiene a sarmiento disposición servicios gratuitos de asistencia lingüística. Taylor al [phone].    We comply with applicable federal civil rights laws and [place] laws. We do not discriminate on the basis of race, color, national origin, age, disability, sex, sexual orientation, or gender identity.            Thank you!     Thank you for choosing [facility]  for your care. Our goal is always to provide you with excellent care. Hearing back from our patients is one way we can continue to improve our services. Please take a few minutes to complete the written survey that you may receive in the mail after your visit with us. Thank you!             Your Updated Medication List - Protect others around you: Learn how to safely use, store and throw away your medicines at www.disposemymeds.org.          This list is accurate as of: 10/19/17  1:13 PM.  Always use your most recent med list.                   Brand Name Dispense Instructions for use Diagnosis    acetaminophen 325 MG tablet    TYLENOL     Take 2 tablets (650 mg) by mouth every 4 hours as needed for mild pain, fever or headaches    Diffuse large B-cell lymphoma of extranodal site (H)       acyclovir 400 MG tablet    ZOVIRAX    60 tablet    Take 1 tablet (400 mg) by mouth 2 times daily    Diffuse large B-cell lymphoma, unspecified body region (H)       ascorbic acid 500 MG Tabs     30 tablet    Take 1 tablet (500 mg) by mouth daily    Diffuse large B-cell lymphoma, unspecified body region (H)       atenolol 25 MG tablet    TENORMIN    60 tablet    Take 1 tablet (25 mg) by mouth 2 times daily    Atrial tachycardia (H)       BENADRYL ALLERGY PO      Take 50 mg by mouth        calcium polycarbophil 625 MG tablet    FIBERCON     Take 1 tablet by mouth 2 times daily        calcium-vitamin D  600-400 MG-UNIT per tablet    CALTRATE    60 tablet    Take 2 tablets by mouth every morning    Diffuse large B-cell lymphoma, unspecified body region (H)       cetirizine 10 MG tablet    zyrTEC     Take 10 mg by mouth daily        digoxin 125 MCG tablet    LANOXIN    30 tablet    Take 1 tablet (125 mcg) by mouth daily    Atrial tachycardia (H)       enoxaparin 40 MG/0.4ML injection    LOVENOX    30 Syringe    Inject 0.4 mLs (40 mg) Subcutaneous daily    VTE (venous thromboembolism), Paroxysmal atrial fibrillation (H)       fluconazole 100 MG tablet    DIFLUCAN    30 tablet    Take 1 tablet (100 mg) by mouth daily    Diffuse large B-cell lymphoma, unspecified body region (H)       gabapentin 100 MG capsule    NEURONTIN    120 capsule    Take 2 capsules (200 mg) by mouth 3 times daily    Critical illness myopathy       HYDROCORTISONE PO      Take 100 mg by mouth IV        lidocaine visc 2% 2.5mL/5mL & maalox/mylanta w/ simeth 2.5mL/5mL & diphenhydrAMINE 5mg/5mL Susp suspension    [facility]     Swish and swallow 10 mLs in mouth 2 times daily        multivitamin, therapeutic with minerals Tabs tablet     30 each    Take 1 tablet by mouth daily    Diffuse large B-cell lymphoma, unspecified body region (H)       ondansetron 8 MG ODT tab    ZOFRAN-ODT    20 tablet    Take 1 tablet (8 mg) by mouth every 8 hours as needed    Diffuse large B-cell lymphoma of extranodal site (H)       potassium & sodium phosphates 280-160-250 MG Packet    NEUTRA-PHOS    60 packet    Take 1 packet by mouth 2 times daily    Hypophosphatemia       potassium chloride SA 20 MEQ CR tablet    K-DUR/KLOR-CON M    30 tablet    Take 1 tablet (20 mEq) by mouth daily while taking furosemide (Lasix). You can decrease potassium to 20 mEq daily once off Lasix.    Diffuse large B-cell lymphoma, unspecified body region (H)       * predniSONE 5 MG tablet    DELTASONE    30 tablet    Take 1 tablet (5 mg) by mouth daily    Diffuse large B-cell  lymphoma, unspecified body region (H)       * predniSONE 50 MG tablet    DELTASONE    10 tablet    Take 1 tablet (50 mg) by mouth 2 times daily for 5 days Take on Days 1 through 5. Take first dose in AM prior to chemotherapy.    Diffuse large B-cell lymphoma of extranodal site (H)       traMADol 50 MG tablet    ULTRAM    30 tablet    Take 1 tablet (50 mg) by mouth every 6 hours as needed for moderate pain    Diffuse large B-cell lymphoma of extranodal site (H)       * Notice:  This list has 2 medication(s) that are the same as other medications prescribed for you. Read the directions carefully, and ask your doctor or other care provider to review them with you.

## 2017-10-19 NOTE — PROGRESS NOTES
Heme/onc visit note  October 19, 2017    Reason for visit: follow up DLBCL, here for cycle 4 R-CHOP    Cancer history: Amelia Michel is a 56 year old female with DLBCL. She had a complicated diagnosis as below:    1. History of Rheumatoid Arthritis status post long term treatment with methotrexate with recent significant complicated course with cardiac arrest, admission with hypotension, sepsis, ICU stay and intubation with subsequent additional readmission from TCU in 6/2017 for FUO with extensive work-up with eventual finding of progression cytopenias with eventual lymphoma diagnosis  2. Bone Marrow Biopsy: 7/12/2017: Highly suspicious for involvement by B cell lymphoma with foci of large atypical CD20+ cells  3. PET CT 7/19/2017: Extensive activity: Right paratracheal lesion with SUV 28, many liver lesions with SUV 15, cardiac lesion intraarterial septum, numerous bone lesions.  4. 7/21 Liver Biopsy: Consistent with Diffuse Large B Cell Lymphoma non-germinal center phenotype  -- FISH for myc, bcl2, bcl6 negative for double-hit lymphoma  5. IPI based on Age < 60 (0 points) + PS 2 (1 + point) + Stage IV Disease (1 point) + Extranodal disease > 1 site (1 point) + elevated LDH (1 point) = 4 Poor Risk Disease  6. Cycle 1 given as R-EPOCH Day 1 = 7/27/2017  -- complications of transfusion dependence and need for acute rehab placement (likely more result of extensive hospital stays)  - LP negative for CNS involvement 8/23/2017  7. Cycle #2: Transition to R-CHOP Day 1 = 8/22/2017  - Day 15 high dose MTX for CNS prophylaxis  - complicated by significant fluid overload and PICC associated DVT  8. Cycle #3 Day 1 = 9/20/2017  9. PET/CT 10/9 shows a complete remission by Lugano criteria.     Interval history: Amelia feels well today. Only new issue is a 10 day mild itchy rash on face and upper trunk. No new drugs, topicals, etc. She has sensitive skin. It got better today with steroid pre-med. No breathing changes or  mucus membrane involvement.     She would like to start ppx lovenox as per conversation with Dr. Rodriguez.     Review Of Systems  General: No fevers, chills, night sweats. Weight stable.   Heme: No bruising or bleeding  HEENT: Chronic gum irritation from a tooth. She uses s/s and magic mouthwash  CV/pulm: No chest pain cough, dyspnea  GI: No nausea, vomiting, diarrhea, constipation, abdominal pain  : No frequency, urgency, dysuria  Musculoskeletal: No bone pain  Extremities: No lower extremity edema.     Past medical history:  Patient Active Problem List   Diagnosis     HTN (hypertension)     Primary pulmonary hypertension (H)     Hyperlipidemia LDL goal <130     Other rheumatoid arthritis with rheumatoid factor of multiple sites     Lymphedema of both lower extremities     Atrial tachycardia (H)     Cardiogenic shock (H)     Acute respiratory failure with hypoxia (H)     Hypertension     Critical illness myopathy     Sepsis (H)     Wound of left upper extremity     Diffuse large B-cell lymphoma of extranodal site (H)     Diffuse large B cell lymphoma (H)     Febrile neutropenia (H)     Physical deconditioning     Health Care Home     DLBCL (diffuse large B cell lymphoma) (H)     H/O cardiac arrest       Medications:    Current Outpatient Prescriptions on File Prior to Visit:  predniSONE (DELTASONE) 50 MG tablet Take 1 tablet (50 mg) by mouth 2 times daily for 5 days Take on Days 1 through 5. Take first dose in AM prior to chemotherapy.   cetirizine (ZYRTEC) 10 MG tablet Take 10 mg by mouth daily   DiphenhydrAMINE HCl (BENADRYL ALLERGY PO) Take 50 mg by mouth   HYDROCORTISONE PO Take 100 mg by mouth IV   magic mouthwash suspension (diphenhydrAMINE, lidocaine, aluminum-magnesium & simethicone) Swish and swallow 10 mLs in mouth 2 times daily   predniSONE (DELTASONE) 5 MG tablet Take 1 tablet (5 mg) by mouth daily (Patient not taking: Reported on 10/19/2017)   [DISCONTINUED] acyclovir (ZOVIRAX) 400 MG tablet Take 1  tablet (400 mg) by mouth 2 times daily   [DISCONTINUED] atenolol (TENORMIN) 25 MG tablet Take 1 tablet (25 mg) by mouth 2 times daily   [DISCONTINUED] digoxin (LANOXIN) 125 MCG tablet Take 1 tablet (125 mcg) by mouth daily   traMADol (ULTRAM) 50 MG tablet Take 1 tablet (50 mg) by mouth every 6 hours as needed for moderate pain (Patient not taking: Reported on 10/19/2017)   acetaminophen (TYLENOL) 325 MG tablet Take 2 tablets (650 mg) by mouth every 4 hours as needed for mild pain, fever or headaches   potassium & sodium phosphates (NEUTRA-PHOS) 280-160-250 MG Packet Take 1 packet by mouth 2 times daily   ondansetron (ZOFRAN-ODT) 8 MG ODT tab Take 1 tablet (8 mg) by mouth every 8 hours as needed (Patient not taking: Reported on 10/19/2017)   calcium polycarbophil (FIBERCON) 625 MG tablet Take 1 tablet by mouth 2 times daily   gabapentin (NEURONTIN) 100 MG capsule Take 2 capsules (200 mg) by mouth 3 times daily   calcium-vitamin D (CALTRATE) 600-400 MG-UNIT per tablet Take 2 tablets by mouth every morning   multivitamin, therapeutic with minerals (THERA-VIT-M) TABS tablet Take 1 tablet by mouth daily   ascorbic acid 500 MG TABS Take 1 tablet (500 mg) by mouth daily     Current Facility-Administered Medications on File Prior to Visit:  [COMPLETED] 0.9% sodium chloride BOLUS   [COMPLETED] fosaprepitant (EMEND) 150 mg in NaCl 0.9 % intermittent infusion   [COMPLETED] palonosetron (ALOXI) injection 0.25 mg   [COMPLETED] acetaminophen (TYLENOL) tablet 650 mg   [COMPLETED] diphenhydrAMINE (BENADRYL) capsule 50 mg   [COMPLETED] pegfilgrastim (NEULASTA Onpro Kit) On-body injector 6 mg   [COMPLETED] cyclophosphamide (CYTOXAN) 1,125 mg in NaCl 0.9 % 331 mL CHEMOTHERAPY   [COMPLETED] vinCRIStine (ONCOVIN) 2 mg in NaCl 0.9 % 30 mL CHEMOTHERAPY   [COMPLETED] DOXOrubicin (ADRIAMYCIN) 75 mg in 37.5 mL CHEMOTHERAPY   [COMPLETED] riTUXimab (RITUXAN) 560 mg in NaCl 0.9 % 560 mL       Allergy:     Allergies   Allergen Reactions      Blood Transfusion Related (Informational Only) Hives     Hives with platelets. Give benadryl premedication.     Metoprolol Other (See Comments)     Pt and  report that metoprolol does not work for her and also reports feeling unwell with this medication. She has been able to tolerate atenolol, which as worked in controlling her HR.      No Clinical Screening - See Comments      Penicillin Allergy Skin Test not performed, see antimicrobial management team progress note 7/5/17.       Penicillins      Tape [Adhesive Tape] Rash       Physical exam  /69 (BP Location: Right arm, Patient Position: Chair, Cuff Size: Adult Regular)  Pulse 57  Temp 98.2  F (36.8  C) (Oral)  Wt 56.2 kg (123 lb 12.8 oz)  SpO2 99%  BMI 25.87 kg/m2  Wt Readings from Last 4 Encounters:   10/19/17 56.2 kg (123 lb 12.8 oz)   10/11/17 56 kg (123 lb 6.4 oz)   10/04/17 56.1 kg (123 lb 9.6 oz)   09/29/17 56.6 kg (124 lb 11.2 oz)     General: No acute distress, sitting in infusion room with her   HEENT: Sclera anicteric. Oral mucosa pink and moist.  No mucositis or thrush  Lymph: No lymphadenopathy in neck, supraclavicular, and axillary areas  Heart: Regular, rate, and rhythm  Lungs: Clear to ascultation bilaterally  Abdomen: Positive bowel sounds. Soft, non-distended, non-tender. No organomegaly or mass.   Extremities: no lower extremity edema  Neuro: Cranial nerves grossly intact  Rash: Fine non specific papular erythematous dry appearing rash (very subtle) on chin and posterior trunk.   Vascular access: port    Labs:  Results for orders placed or performed in visit on 10/19/17 (from the past 24 hour(s))   CBC with platelets differential   Result Value Ref Range    WBC 3.2 (L) 4.0 - 11.0 10e9/L    RBC Count 2.71 (L) 3.8 - 5.2 10e12/L    Hemoglobin 10.6 (L) 11.7 - 15.7 g/dL    Hematocrit 32.2 (L) 35.0 - 47.0 %     (H) 78 - 100 fl    MCH 39.1 (H) 26.5 - 33.0 pg    MCHC 32.9 31.5 - 36.5 g/dL    RDW 16.6 (H) 10.0 - 15.0 %     Platelet Count 122 (L) 150 - 450 10e9/L    Diff Method Manual Differential     % Neutrophils 67.8 %    % Lymphocytes 18.3 %    % Monocytes 8.7 %    % Eosinophils 4.3 %    % Basophils 0.9 %    Absolute Neutrophil 2.2 1.6 - 8.3 10e9/L    Absolute Lymphocytes 0.6 (L) 0.8 - 5.3 10e9/L    Absolute Monocytes 0.3 0.0 - 1.3 10e9/L    Absolute Eosinophils 0.1 0.0 - 0.7 10e9/L    Absolute Basophils 0.0 0.0 - 0.2 10e9/L    Anisocytosis Slight     Macrocytes Present    Comprehensive metabolic panel   Result Value Ref Range    Sodium 142 133 - 144 mmol/L    Potassium 3.8 3.4 - 5.3 mmol/L    Chloride 107 94 - 109 mmol/L    Carbon Dioxide 30 20 - 32 mmol/L    Anion Gap 5 3 - 14 mmol/L    Glucose 94 70 - 99 mg/dL    Urea Nitrogen 15 7 - 30 mg/dL    Creatinine 0.67 0.52 - 1.04 mg/dL    GFR Estimate >90 >60 mL/min/1.7m2    GFR Estimate If Black >90 >60 mL/min/1.7m2    Calcium 9.1 8.5 - 10.1 mg/dL    Bilirubin Total 0.4 0.2 - 1.3 mg/dL    Albumin 3.3 (L) 3.4 - 5.0 g/dL    Protein Total 6.0 (L) 6.8 - 8.8 g/dL    Alkaline Phosphatase 99 40 - 150 U/L    ALT 63 (H) 0 - 50 U/L    AST 39 0 - 45 U/L       Assessment and plan:  Amelia Michel is a 56 year old woman with history of rhematoid arthritis and recent diagnosis of stage IVB high risk aggressive Diffuse large B cell lymphoma     1. Lymphoma: Got cycle #1 in the hospital and I chose R-EPOCH for infusional nature due to possible intracardiac involvement and also extensive disease to allow for slower lymphoma kill. Tolerated this well, aside from significant cytopenias (which were present at diagnosis). Then transitioned to R-CHOP to finish out a total of 6 cycles of chemotherapy (1 R-EPOCH and 5 R-CHOP) with initially planned for High Dose MTX on Day 15 of cycle 2,4, and 6 due to high IPI.   -- Amelia had significant toxicity with the high dose methotrexate with PICC associated clot, significant volume overload, increased neuropathy and thus moving forward Dr Rodriguez is planning on  doing a couple of IT chemo as the prophylaxis given the risk/benefit ratio.  -Cycle 4 R-CHOP today (delayed 5 days for count recovery)  -Schedule XR lumbar puncture with KEVIN administering chemo 11/7 or 8 if counts are recovered. Advised her to hold lovenox before the procedure. Will also schedule her pre chemo assessment that day with labs.     2. Rash: non-specific and improved with today's premeds. Advised Aquaphor and PO antihistamine prn.      3. ID: Remains on acyclovir and fluconazole. Abx when ANC<1     4. Rheumatoid arthritis: is on prednisone 5 mg daily. Will continue this in between her chemo sessions.      5. CV: A. Fib. On digoxin and atenolol. Now on lovenox for DVT so anticoagulated. Will need to start aspirin if the lovenox is stopped     5. PICC Associated RUE DVT: she was on therapetic lovenox and US 10/16 showed the clot cleared. She wishes to take ppx dose lovenox. Will hold this before procedures of if plt<50K.     Over 50% of 25 min visit was spent counseling and coordinating care    Stephanie Fagan PA-C

## 2017-10-20 ENCOUNTER — DOCUMENTATION ONLY (OUTPATIENT)
Dept: ONCOLOGY | Facility: CLINIC | Age: 57
End: 2017-10-20

## 2017-10-20 NOTE — PROGRESS NOTES
Form Request Documentation    Date Received in Clinic:  10/18/17  Name/Type of Form: Workability form  Questions that need to be addressed:   Current Employment Status: not currently working, having last worked on 5/23/17 approximately, on a full time basis.    Amount of Leave Requested: Intermittent Leave beginning 5/23/17 through approximately beginning of January 2018   Other: None  Date Completed: 10/20/2017  Copy Mailed to patient: Yes on 10/20/2017  Disposition of Form: Fax to [place] at [phone] on 10/20/17

## 2017-10-27 ENCOUNTER — TELEPHONE (OUTPATIENT)
Dept: ONCOLOGY | Facility: CLINIC | Age: 57
End: 2017-10-27

## 2017-10-27 NOTE — TELEPHONE ENCOUNTER
Prior Authorization Approval    Authorization Effective Date: 10/19/2017  Authorization Expiration Date: 10/19/2018  Medication: Enoxaparin - Approval  Approved Dose/Quantity: 40mcg/0.4ml   Reference #:     Insurance Company: [employer] - Phone [phone] Fax [phone]  Expected CoPay:       CoPay Card Available:      Foundation Assistance Needed:    Which Pharmacy is filling the prescription (Not needed for infusion/clinic administered):    Pharmacy Notified: Yes  Patient Notified: Yes

## 2017-10-30 ENCOUNTER — OFFICE VISIT (OUTPATIENT)
Dept: UROLOGY | Facility: CLINIC | Age: 57
End: 2017-10-30

## 2017-10-30 ENCOUNTER — DOCUMENTATION ONLY (OUTPATIENT)
Dept: ONCOLOGY | Facility: CLINIC | Age: 57
End: 2017-10-30

## 2017-10-30 VITALS
WEIGHT: 116.4 LBS | BODY MASS INDEX: 24.43 KG/M2 | SYSTOLIC BLOOD PRESSURE: 126 MMHG | DIASTOLIC BLOOD PRESSURE: 73 MMHG | HEART RATE: 67 BPM | HEIGHT: 58 IN

## 2017-10-30 DIAGNOSIS — R39.15 URINARY URGENCY: ICD-10-CM

## 2017-10-30 DIAGNOSIS — N39.41 URGE INCONTINENCE OF URINE: Primary | ICD-10-CM

## 2017-10-30 DIAGNOSIS — R35.0 URINARY FREQUENCY: ICD-10-CM

## 2017-10-30 RX ORDER — TOLTERODINE 4 MG/1
4 CAPSULE, EXTENDED RELEASE ORAL DAILY
Qty: 30 CAPSULE | Refills: 5 | Status: SHIPPED | OUTPATIENT
Start: 2017-10-30 | End: 2018-03-01 | Stop reason: ALTCHOICE

## 2017-10-30 ASSESSMENT — PAIN SCALES - GENERAL: PAINLEVEL: NO PAIN (0)

## 2017-10-30 NOTE — PROGRESS NOTES
"CC: urinary incontinence.    HPI: It is a pleasure to see Ms. Amelia Michel, a very pleasant 56 year old female with a PMH of diffuse large B cell lymphoma currently receiving chemotherapy, h/o cardiac arrest, DVT on chronic anticoagulation (currently with Lovenox), RA (on prednisone), and HTN seen today in the urology clinic in consultation from hospitalist for evaluation of urinary incontinence.  This problem has been going on for \"many years\" per patient. She states she has urinary frequency, urgency, and urge incontinence. This occurs several times per week but not every day. Voids q2 hours day and night. Occasionally leaks while she is sleeping as well. Wears Depends diapers and goes through 2 per day. She denies leakage with coughing, sneezing, bending at the waist.    Ms. Michel has not tried anything in the past for her condition. She denies any dysuria, hematuria, pyuria, hesitancy, intermittency, feeling of incomplete emptying, or history of recurrent UTI's or stones.    The patient does not have issues with constipation or splinting.  She denies dyspareunia or pelvic pain.   She denies a vaginal bulge. She drinks water but no caffeine (cut this out as she found it to aggravate her frequency/urgency/incontinence issues).    She apparently developed some saddle anesthesias in 2015 or  (can't remember when exactly). Had lumbar MRI and neurosurgical consult who felt incontinence was not related to MRI findings and recommended no surgical intervention.     OBSTETRIC HISTORY:    She is .  Age at menopause: [age], few years of HRT but then stopped.    Past Medical History:   Diagnosis Date     A-fib (H)      Cardiac arrest (H)      Hypertension      Neuropathy      Rheumatoid arthritis(714.0)      Past Surgical History:   Procedure Laterality Date     ANESTHESIA CARDIOVERSION N/A 2017    Procedure: ANESTHESIA CARDIOVERSION;  ANESTHESIA CARDIOVERSION;  Surgeon: GENERIC ANESTHESIA PROVIDER;  " Location: [facility] OR     ARTHRODESIS WRIST  2000    Right wrist     BONE MARROW ASPIRATE &BIOPSY  7/12/2017          FOOT SURGERY      4 left and 2 right     RELEASE CARPAL TUNNEL BILATERAL       REPAIR VENTRICULAR SEPTAL DEFECT  1969     Current Outpatient Prescriptions   Medication Sig Dispense Refill     cetirizine (ZYRTEC) 10 MG tablet Take 10 mg by mouth daily       acyclovir (ZOVIRAX) 400 MG tablet Take 1 tablet (400 mg) by mouth 2 times daily 60 tablet 1     atenolol (TENORMIN) 25 MG tablet Take 1 tablet (25 mg) by mouth 2 times daily 60 tablet 1     digoxin (LANOXIN) 125 MCG tablet Take 1 tablet (125 mcg) by mouth daily 30 tablet 0     fluconazole (DIFLUCAN) 100 MG tablet Take 1 tablet (100 mg) by mouth daily 30 tablet 1     potassium chloride SA (K-DUR/KLOR-CON M) 20 MEQ CR tablet Take 1 tablet (20 mEq) by mouth daily while taking furosemide (Lasix). You can decrease potassium to 20 mEq daily once off Lasix. 30 tablet 0     enoxaparin (LOVENOX) 40 MG/0.4ML injection Inject 0.4 mLs (40 mg) Subcutaneous daily 30 Syringe 0     DiphenhydrAMINE HCl (BENADRYL ALLERGY PO) Take 50 mg by mouth       HYDROCORTISONE PO Take 100 mg by mouth IV       magic mouthwash suspension (diphenhydrAMINE, lidocaine, aluminum-magnesium & simethicone) Swish and swallow 10 mLs in mouth 2 times daily       predniSONE (DELTASONE) 5 MG tablet Take 1 tablet (5 mg) by mouth daily 30 tablet 0     traMADol (ULTRAM) 50 MG tablet Take 1 tablet (50 mg) by mouth every 6 hours as needed for moderate pain 30 tablet 0     acetaminophen (TYLENOL) 325 MG tablet Take 2 tablets (650 mg) by mouth every 4 hours as needed for mild pain, fever or headaches       potassium & sodium phosphates (NEUTRA-PHOS) 280-160-250 MG Packet Take 1 packet by mouth 2 times daily 60 packet 1     ondansetron (ZOFRAN-ODT) 8 MG ODT tab Take 1 tablet (8 mg) by mouth every 8 hours as needed 20 tablet 2     calcium polycarbophil (FIBERCON) 625 MG tablet Take 1 tablet by mouth 2  "times daily       gabapentin (NEURONTIN) 100 MG capsule Take 2 capsules (200 mg) by mouth 3 times daily 120 capsule 0     calcium-vitamin D (CALTRATE) 600-400 MG-UNIT per tablet Take 2 tablets by mouth every morning 60 tablet 0     multivitamin, therapeutic with minerals (THERA-VIT-M) TABS tablet Take 1 tablet by mouth daily 30 each 0     ascorbic acid 500 MG TABS Take 1 tablet (500 mg) by mouth daily 30 tablet 0     Allergies   Allergen Reactions     Blood Transfusion Related (Informational Only) Hives     Hives with platelets. Give benadryl premedication.     Metoprolol Other (See Comments)     Pt and  report that metoprolol does not work for her and also reports feeling unwell with this medication. She has been able to tolerate atenolol, which as worked in controlling her HR.      No Clinical Screening - See Comments      Penicillin Allergy Skin Test not performed, see antimicrobial management team progress note 7/5/17.       Penicillins      Tape [Adhesive Tape] Rash       FAMILY HISTORY: The patient denies any history of genitourinary carcinoma and denies history or urolithiasis.    SOCIAL HISTORY: The patient does note smoke cigarettes, minimal EtOH, and no illicit drug use.      ROS: A comprehensive 14 point ROS was obtained and is positive for HTN, physical deconditioning, RA.  The remainder of the ROS is negative except for that outlined above in the HPI.    PHYSICAL EXAM:   Vitals:    10/30/17 0918   BP: 126/73   Pulse: 67   Weight: 52.8 kg (116 lb 6.4 oz)   Height: 1.473 m (4' 10\")     GENERAL: Well groomed/well developed/well nourished female in NAD.  HEENT: EOMI, AT, NC.  SKIN: Warm to touch, dry.  No visible rashes or lesions.  RESP: No increased respiratory effort.  MS: Full ROM in extremities.  PELVIC: Deferred today.  NEURO: Alert and oriented x 3.  PSYCH: Normal mood and affect, pleasant and agreeable during interview and exam.    PVR: Postvoid residual urine volume as measured by ultrasound " was 25 mL.    Infusion Therapy Visit on 10/19/2017   Component Date Value Ref Range Status     WBC 10/19/2017 3.2* 4.0 - 11.0 10e9/L Final     RBC Count 10/19/2017 2.71* 3.8 - 5.2 10e12/L Final     Hemoglobin 10/19/2017 10.6* 11.7 - 15.7 g/dL Final     Hematocrit 10/19/2017 32.2* 35.0 - 47.0 % Final     MCV 10/19/2017 119* 78 - 100 fl Final     MCH 10/19/2017 39.1* 26.5 - 33.0 pg Final     MCHC 10/19/2017 32.9  31.5 - 36.5 g/dL Final     RDW 10/19/2017 16.6* 10.0 - 15.0 % Final     Platelet Count 10/19/2017 122* 150 - 450 10e9/L Final     Diff Method 10/19/2017 Manual Differential   Final     % Neutrophils 10/19/2017 67.8  % Final     % Lymphocytes 10/19/2017 18.3  % Final     % Monocytes 10/19/2017 8.7  % Final     % Eosinophils 10/19/2017 4.3  % Final     % Basophils 10/19/2017 0.9  % Final     Absolute Neutrophil 10/19/2017 2.2  1.6 - 8.3 10e9/L Final     Absolute Lymphocytes 10/19/2017 0.6* 0.8 - 5.3 10e9/L Final     Absolute Monocytes 10/19/2017 0.3  0.0 - 1.3 10e9/L Final     Absolute Eosinophils 10/19/2017 0.1  0.0 - 0.7 10e9/L Final     Absolute Basophils 10/19/2017 0.0  0.0 - 0.2 10e9/L Final     Anisocytosis 10/19/2017 Slight   Final     Macrocytes 10/19/2017 Present   Final     Sodium 10/19/2017 142  133 - 144 mmol/L Final     Potassium 10/19/2017 3.8  3.4 - 5.3 mmol/L Final     Chloride 10/19/2017 107  94 - 109 mmol/L Final     Carbon Dioxide 10/19/2017 30  20 - 32 mmol/L Final     Anion Gap 10/19/2017 5  3 - 14 mmol/L Final     Glucose 10/19/2017 94  70 - 99 mg/dL Final     Urea Nitrogen 10/19/2017 15  7 - 30 mg/dL Final     Creatinine 10/19/2017 0.67  0.52 - 1.04 mg/dL Final     GFR Estimate 10/19/2017 >90  >60 mL/min/1.7m2 Final    Non  GFR Calc     GFR Estimate If Black 10/19/2017 >90  >60 mL/min/1.7m2 Final    African American GFR Calc     Calcium 10/19/2017 9.1  8.5 - 10.1 mg/dL Final     Bilirubin Total 10/19/2017 0.4  0.2 - 1.3 mg/dL Final     Albumin 10/19/2017 3.3* 3.4 - 5.0  "g/dL Final     Protein Total 10/19/2017 6.0* 6.8 - 8.8 g/dL Final     Alkaline Phosphatase 10/19/2017 99  40 - 150 U/L Final     ALT 10/19/2017 63* 0 - 50 U/L Final     AST 10/19/2017 39  0 - 45 U/L Final       IMAGING:   MR LUMBAR SPINE W/O & W CONTRAST 7/6/2017   Impression:  Redemonstration of marked degenerative changes throughout  the lumbar spine, stable since 3/20/2016. Findings include severe  right neural foraminal narrowing at L1-2, and severe left neural  foraminal narrowing at L2-3 and L3-4.    ASSESSMENT and PLAN:    Ms. Amelia Michel is a very pleasant 56 year old female with a PMH of diffuse large B cell lymphoma currently receiving chemotherapy, h/o cardiac arrest, DVT on chronic anticoagulation (currently with Lovenox), RA (on prednisone), saddle anesthesia (present since 1/2015 - neurosurg felt unlikely related to incontinence/urinary issues), urinary urgency, and urinary incontinence - sounds to be urge-related but possibly some incontinence without awareness as well. Patient states her urinary issues have been ongoing for \"many years.\"    We discussed potential management options including continued observation, dedicated pelvic floor physical therapy with biofeedback, medication therapy including anticholinergics and Myrbetriq, bladder Botox injections, PTNS, InterStim or further evaluation with urodynamics. Given her complicated PMH with c/o saddle anesthesia, further evaluation would certainly be warranted. Discussed this with the patient but she elects to proceed more conservatively to start. She would like to trial a medication. If further testing is needed, she would like it to occur after the first of the year when she is finished with chemotherapy.  -Start Detrol LA 4 mg daily. Side effects discussed.   -Discussed proper fluid intake, limiting fluids before bed, timed voiding, etc.    Follow up in 6-8 weeks to recheck, sooner with any issues. If no improvement, will likely plan to " proceed with urodynamic studies.     I have enjoyed participating in the medical care of this very pleasant patient.  Please don't hesitate to contact me with any questions or concerns.     Archana Green PA-C  Department of Urology

## 2017-10-30 NOTE — LETTER
"10/30/2017       RE: Amelia Michel  [address]  [facility] [id number]     Dear Colleague,    Thank you for referring your patient, Amelia Michel, to the [facility] AND [facility]ST FOR PROSTATE AND UROLOGIC CANCERS at [facility]. Please see a copy of my visit note below.    CC: urinary incontinence.    HPI: It is a pleasure to see Ms. Amelia Michel, a very pleasant 56 year old female with a PMH of diffuse large B cell lymphoma currently receiving chemotherapy, h/o cardiac arrest, DVT on chronic anticoagulation (currently with Lovenox), RA (on prednisone), and HTN seen today in the urology clinic in consultation from hospitalist for evaluation of urinary incontinence.  This problem has been going on for \"many years\" per patient. She states she has urinary frequency, urgency, and urge incontinence. This occurs several times per week but not every day. Voids q2 hours day and night. Occasionally leaks while she is sleeping as well. Wears Depends diapers and goes through 2 per day. She denies leakage with coughing, sneezing, bending at the waist.    Ms. Michel has not tried anything in the past for her condition. She denies any dysuria, hematuria, pyuria, hesitancy, intermittency, feeling of incomplete emptying, or history of recurrent UTI's or stones.    The patient does not have issues with constipation or splinting.  She denies dyspareunia or pelvic pain.   She denies a vaginal bulge. She drinks water but no caffeine (cut this out as she found it to aggravate her frequency/urgency/incontinence issues).    She apparently developed some saddle anesthesias in 2015 or  (can't remember when exactly). Had lumbar MRI and neurosurgical consult who felt incontinence was not related to MRI findings and recommended no surgical intervention.     OBSTETRIC HISTORY:    She is .  Age at menopause: [age], few years of HRT but then stopped.    Past Medical History: "   Diagnosis Date     A-fib (H)      Cardiac arrest (H)      Hypertension      Neuropathy      Rheumatoid arthritis(714.0)      Past Surgical History:   Procedure Laterality Date     ANESTHESIA CARDIOVERSION N/A 5/17/2017    Procedure: ANESTHESIA CARDIOVERSION;  ANESTHESIA CARDIOVERSION;  Surgeon: GENERIC ANESTHESIA PROVIDER;  Location: [facility] OR     ARTHRODESIS WRIST  2000    Right wrist     BONE MARROW ASPIRATE &BIOPSY  7/12/2017          FOOT SURGERY      4 left and 2 right     RELEASE CARPAL TUNNEL BILATERAL       REPAIR VENTRICULAR SEPTAL DEFECT  1969     Current Outpatient Prescriptions   Medication Sig Dispense Refill     cetirizine (ZYRTEC) 10 MG tablet Take 10 mg by mouth daily       acyclovir (ZOVIRAX) 400 MG tablet Take 1 tablet (400 mg) by mouth 2 times daily 60 tablet 1     atenolol (TENORMIN) 25 MG tablet Take 1 tablet (25 mg) by mouth 2 times daily 60 tablet 1     digoxin (LANOXIN) 125 MCG tablet Take 1 tablet (125 mcg) by mouth daily 30 tablet 0     fluconazole (DIFLUCAN) 100 MG tablet Take 1 tablet (100 mg) by mouth daily 30 tablet 1     potassium chloride SA (K-DUR/KLOR-CON M) 20 MEQ CR tablet Take 1 tablet (20 mEq) by mouth daily while taking furosemide (Lasix). You can decrease potassium to 20 mEq daily once off Lasix. 30 tablet 0     enoxaparin (LOVENOX) 40 MG/0.4ML injection Inject 0.4 mLs (40 mg) Subcutaneous daily 30 Syringe 0     DiphenhydrAMINE HCl (BENADRYL ALLERGY PO) Take 50 mg by mouth       HYDROCORTISONE PO Take 100 mg by mouth IV       magic mouthwash suspension (diphenhydrAMINE, lidocaine, aluminum-magnesium & simethicone) Swish and swallow 10 mLs in mouth 2 times daily       predniSONE (DELTASONE) 5 MG tablet Take 1 tablet (5 mg) by mouth daily 30 tablet 0     traMADol (ULTRAM) 50 MG tablet Take 1 tablet (50 mg) by mouth every 6 hours as needed for moderate pain 30 tablet 0     acetaminophen (TYLENOL) 325 MG tablet Take 2 tablets (650 mg) by mouth every 4 hours as needed for mild pain,  "fever or headaches       potassium & sodium phosphates (NEUTRA-PHOS) 280-160-250 MG Packet Take 1 packet by mouth 2 times daily 60 packet 1     ondansetron (ZOFRAN-ODT) 8 MG ODT tab Take 1 tablet (8 mg) by mouth every 8 hours as needed 20 tablet 2     calcium polycarbophil (FIBERCON) 625 MG tablet Take 1 tablet by mouth 2 times daily       gabapentin (NEURONTIN) 100 MG capsule Take 2 capsules (200 mg) by mouth 3 times daily 120 capsule 0     calcium-vitamin D (CALTRATE) 600-400 MG-UNIT per tablet Take 2 tablets by mouth every morning 60 tablet 0     multivitamin, therapeutic with minerals (THERA-VIT-M) TABS tablet Take 1 tablet by mouth daily 30 each 0     ascorbic acid 500 MG TABS Take 1 tablet (500 mg) by mouth daily 30 tablet 0     Allergies   Allergen Reactions     Blood Transfusion Related (Informational Only) Hives     Hives with platelets. Give benadryl premedication.     Metoprolol Other (See Comments)     Pt and  report that metoprolol does not work for her and also reports feeling unwell with this medication. She has been able to tolerate atenolol, which as worked in controlling her HR.      No Clinical Screening - See Comments      Penicillin Allergy Skin Test not performed, see antimicrobial management team progress note 7/5/17.       Penicillins      Tape [Adhesive Tape] Rash       FAMILY HISTORY: The patient denies any history of genitourinary carcinoma and denies history or urolithiasis.    SOCIAL HISTORY: The patient does note smoke cigarettes, minimal EtOH, and no illicit drug use.      ROS: A comprehensive 14 point ROS was obtained and is positive for HTN, physical deconditioning, RA.  The remainder of the ROS is negative except for that outlined above in the HPI.    PHYSICAL EXAM:   Vitals:    10/30/17 0918   BP: 126/73   Pulse: 67   Weight: 52.8 kg (116 lb 6.4 oz)   Height: 1.473 m (4' 10\")     GENERAL: Well groomed/well developed/well nourished female in NAD.  HEENT: EOMI, AT, NC.  SKIN: " Warm to touch, dry.  No visible rashes or lesions.  RESP: No increased respiratory effort.  MS: Full ROM in extremities.  PELVIC: Deferred today.  NEURO: Alert and oriented x 3.  PSYCH: Normal mood and affect, pleasant and agreeable during interview and exam.    PVR: Postvoid residual urine volume as measured by ultrasound was 25 mL.    Infusion Therapy Visit on 10/19/2017   Component Date Value Ref Range Status     WBC 10/19/2017 3.2* 4.0 - 11.0 10e9/L Final     RBC Count 10/19/2017 2.71* 3.8 - 5.2 10e12/L Final     Hemoglobin 10/19/2017 10.6* 11.7 - 15.7 g/dL Final     Hematocrit 10/19/2017 32.2* 35.0 - 47.0 % Final     MCV 10/19/2017 119* 78 - 100 fl Final     MCH 10/19/2017 39.1* 26.5 - 33.0 pg Final     MCHC 10/19/2017 32.9  31.5 - 36.5 g/dL Final     RDW 10/19/2017 16.6* 10.0 - 15.0 % Final     Platelet Count 10/19/2017 122* 150 - 450 10e9/L Final     Diff Method 10/19/2017 Manual Differential   Final     % Neutrophils 10/19/2017 67.8  % Final     % Lymphocytes 10/19/2017 18.3  % Final     % Monocytes 10/19/2017 8.7  % Final     % Eosinophils 10/19/2017 4.3  % Final     % Basophils 10/19/2017 0.9  % Final     Absolute Neutrophil 10/19/2017 2.2  1.6 - 8.3 10e9/L Final     Absolute Lymphocytes 10/19/2017 0.6* 0.8 - 5.3 10e9/L Final     Absolute Monocytes 10/19/2017 0.3  0.0 - 1.3 10e9/L Final     Absolute Eosinophils 10/19/2017 0.1  0.0 - 0.7 10e9/L Final     Absolute Basophils 10/19/2017 0.0  0.0 - 0.2 10e9/L Final     Anisocytosis 10/19/2017 Slight   Final     Macrocytes 10/19/2017 Present   Final     Sodium 10/19/2017 142  133 - 144 mmol/L Final     Potassium 10/19/2017 3.8  3.4 - 5.3 mmol/L Final     Chloride 10/19/2017 107  94 - 109 mmol/L Final     Carbon Dioxide 10/19/2017 30  20 - 32 mmol/L Final     Anion Gap 10/19/2017 5  3 - 14 mmol/L Final     Glucose 10/19/2017 94  70 - 99 mg/dL Final     Urea Nitrogen 10/19/2017 15  7 - 30 mg/dL Final     Creatinine 10/19/2017 0.67  0.52 - 1.04 mg/dL Final     GFR  "Estimate 10/19/2017 >90  >60 mL/min/1.7m2 Final    Non  GFR Calc     GFR Estimate If Black 10/19/2017 >90  >60 mL/min/1.7m2 Final    African American GFR Calc     Calcium 10/19/2017 9.1  8.5 - 10.1 mg/dL Final     Bilirubin Total 10/19/2017 0.4  0.2 - 1.3 mg/dL Final     Albumin 10/19/2017 3.3* 3.4 - 5.0 g/dL Final     Protein Total 10/19/2017 6.0* 6.8 - 8.8 g/dL Final     Alkaline Phosphatase 10/19/2017 99  40 - 150 U/L Final     ALT 10/19/2017 63* 0 - 50 U/L Final     AST 10/19/2017 39  0 - 45 U/L Final       IMAGING:   MR LUMBAR SPINE W/O & W CONTRAST 7/6/2017   Impression:  Redemonstration of marked degenerative changes throughout  the lumbar spine, stable since 3/20/2016. Findings include severe  right neural foraminal narrowing at L1-2, and severe left neural  foraminal narrowing at L2-3 and L3-4.    ASSESSMENT and PLAN:    Ms. Amelia Michel is a very pleasant 56 year old female with a PMH of diffuse large B cell lymphoma currently receiving chemotherapy, h/o cardiac arrest, DVT on chronic anticoagulation (currently with Lovenox), RA (on prednisone), saddle anesthesia (present since 1/2015 - neurosurg felt unlikely related to incontinence/urinary issues), urinary urgency, and urinary incontinence - sounds to be urge-related but possibly some incontinence without awareness as well. Patient states her urinary issues have been ongoing for \"many years.\"    We discussed potential management options including continued observation, dedicated pelvic floor physical therapy with biofeedback, medication therapy including anticholinergics and Myrbetriq, bladder Botox injections, PTNS, InterStim or further evaluation with urodynamics. Given her complicated PMH with c/o saddle anesthesia, further evaluation would certainly be warranted. Discussed this with the patient but she elects to proceed more conservatively to start. She would like to trial a medication. If further testing is needed, she would like " it to occur after the first of the year when she is finished with chemotherapy.  -Start Detrol LA 4 mg daily. Side effects discussed.   -Discussed proper fluid intake, limiting fluids before bed, timed voiding, etc.    Follow up in 6-8 weeks to recheck, sooner with any issues. If no improvement, will likely plan to proceed with urodynamic studies.     I have enjoyed participating in the medical care of this very pleasant patient.  Please don't hesitate to contact me with any questions or concerns.     Archana Green PA-C  Department of Urology

## 2017-10-30 NOTE — PATIENT INSTRUCTIONS
UROLOGY CLINIC VISIT PATIENT INSTRUCTIONS    1) Start taking tolterodine (Detrol) LA 4 mg once daily.   -Call me if side effects are intolerable (dry eyes, dry mouth, constipation).    Follow up in 6-8 weeks to recheck. If no improvement, we will plan to proceed with urodynamic testing.      If you have any issues, questions or concerns in the meantime, do not hesitate to contact us at [phone] or via [employer].     It was a pleasure meeting with you today.  Thank you for allowing me and my team the privilege of caring for you today.  YOU are the reason we are here, and I truly hope we provided you with the excellent service you deserve.  Please let us know if there is anything else we can do for you so that we can be sure you are leaving completely satisfied with your care experience.

## 2017-10-30 NOTE — PROGRESS NOTES
Form Request Documentation    Date Received in Clinic:  10/30/17  Name/Type of Form: Certificate for Health Care Provider for Family Member's Serious Health Condition  Questions that need to be addressed:   Family leave for   Date Completed: 10/30/2017  Copy Mailed to patient: Yes on 10/30/2017 per patient's request  Disposition of Form: Fax to Abhi at [phone] on 10/30/17

## 2017-10-30 NOTE — MR AVS SNAPSHOT
After Visit Summary   10/30/2017    Amelia Michel    MRN: [id number]           Patient Information     Date Of Birth          1960        Visit Information        Provider Department      10/30/2017 9:30 AM Archana Green PA-C [facility]        Today's Diagnoses     Urge incontinence of urine    -  1    Urinary urgency          Care Instructions    UROLOGY CLINIC VISIT PATIENT INSTRUCTIONS    1) Start taking tolterodine (Detrol) LA 4 mg once daily.   -Call me if side effects are intolerable (dry eyes, dry mouth, constipation).    Follow up in 6-8 weeks to recheck. If no improvement, we will plan to proceed with urodynamic testing.      If you have any issues, questions or concerns in the meantime, do not hesitate to contact us at [phone] or via [employer].     It was a pleasure meeting with you today.  Thank you for allowing me and my team the privilege of caring for you today.  YOU are the reason we are here, and I truly hope we provided you with the excellent service you deserve.  Please let us know if there is anything else we can do for you so that we can be sure you are leaving completely satisfied with your care experience.                Follow-ups after your visit        Your next 10 appointments already scheduled     Nov 07, 2017  6:30 AM CST   Masonic Lab Draw with  MASONIC LAB DRAW   [facility] Lab Draw ([facility])    [address] [place] [address]   [phone]            Nov 07, 2017  7:00 AM CST   (Arrive by 6:45 AM)   Return Visit with CORI Cristobal [facility] ([facility])    [address] [place] [address]   [phone]            Nov 07, 2017  9:30 AM CST   Procedure 3 hour with U2A ROOM 10   [address] [facility] ([facility])     [address] [address]               Nov 07, 2017 11:00 AM CST   XR LUMBAR PUNCTURE INTRATHECAL CHEMO ADMIN with UTENA DUMONTU NEURO RAD   [facility] [place],  Radiology ([facility])    [address] [id number] [phone]           For nerve root injection, please send or bring copies of any MRIs or other scans you have had.  Bring a list of your current medicines to your exam. (Include vitamins, minerals and over-the-counter medicines.) Leave your valuables at home.  Plan to have someone drive you home afterward.  Stop taking the following medicines (but talk to your doctor first):   If you take blood thinners, you may need to stop taking them a few days before treatment. Talk to your doctor before stopping these medicines.Stop taking Coumadin (warfarin) 3 days before treatment. Restart the day after treatment.   If you take Plavix, Ticlid, Pletal or Persantine, please ask your doctor if you should stop these medicines. You may need extra tests on the morning of your scan.   If you take Xarelto (Rivaroxaban), you may need to stop taking it 24 hours before treatment. Talk to your doctor before stopping this medicine. Restart the day after treatment.  You may take your other medicines as normal.  Stop all food and drink (including water) 3 hours before your test or treatment.  Please tell the doctor:   If you are allergic to X-ray dye (contrast fluid).   If you may be pregnant.  Injections take about 30 to 45 minutes. Most people spend up to 2 hours in the clinic or hospital.  Please call the Imaging Department at your exam site with any questions.            Nov 07, 2017 11:40 AM CST   (Arrive by 11:25 AM)   Lumbar Puncture with Kalpana Alvarado PA-C   [facility] ([facility])    [address]  2nd Floor  [facility] [id number] [phone]            Nov 08, 2017  7:30 AM  CST   Masonic Lab Draw with UC MASONIC LAB DRAW   [facility] ([facility])    [address] [id number] [phone]            Nov 08, 2017  8:00 AM CST   Infusion 360 with UC ONCOLOGY INFUSION, UC 15 ATC   [facility] ([facility])    [address] [id number] [phone]            Nov 29, 2017  8:00 AM CST   Masonic Lab Draw with UC MASONIC LAB DRAW   [facility] ([facility])    [address] [id number] [phone]              Who to contact     Please call your clinic at [phone] to:    Ask questions about your health    Make or cancel appointments    Discuss your medicines    Learn about your test results    Speak to your doctor   If you have compliments or concerns about an experience at your clinic, or if you wish to file a complaint, please contact [facility] Patient Relations at [phone] or email us at [email or web address]         Additional Information About Your Visit        [employer] Information     [employer] gives you secure access to your electronic health record. If you see a primary care provider, you can also send messages to your care team and make appointments. If you have questions, please call your primary care clinic.  If you do not have a primary care provider, please call [phone] and they will assist you.      [employer] is an electronic gateway that provides easy, online access to your medical records. With [employer], you can request a clinic appointment, read your test results, renew a prescription or communicate with your care team.     To access your existing account, please contact your [facility] or call [phone] for assistance.        Care EveryWhere ID     This is your Care  "EveryWhere ID. This could be used by other organizations to access your [facility] medical records  [id number]        Your Vitals Were     Pulse Height BMI (Body Mass Index)             67 1.473 m (4' 10\") 24.33 kg/m2          Blood Pressure from Last 3 Encounters:   10/30/17 126/73   10/19/17 128/69   10/11/17 120/63    Weight from Last 3 Encounters:   10/30/17 52.8 kg (116 lb 6.4 oz)   10/19/17 56.2 kg (123 lb 12.8 oz)   10/11/17 56 kg (123 lb 6.4 oz)              We Performed the Following     POST-VOID RESIDUAL BLADDER SCAN          Today's Medication Changes          These changes are accurate as of: 10/30/17  9:52 AM.  If you have any questions, ask your nurse or doctor.               Start taking these medicines.        Dose/Directions    tolterodine 4 MG 24 hr capsule   Commonly known as:  DETROL LA   Used for:  Urinary urgency   Started by:  Archana Green PA-C        Dose:  4 mg   Take 1 capsule (4 mg) by mouth daily   Quantity:  30 capsule   Refills:  5            Where to get your medicines      These medications were sent to [place] [id number] [address]  [address] [place] [address] [address] [address]    Hours:  TRANSPLANT PHONE NUMBER [phone] Phone:  [phone]     tolterodine 4 MG 24 hr capsule                Primary Care Provider Office Phone # Fax #    Comfort Sabillon [phone] [address] [facility] [address]        Equal Access to Services     [facility] Center[facility] AH: Hadii laurita rodgerso Sogilbert, waaxda luqadaha, qaybta kaalmada adeegyada, waxay idishelby inman. So [facility] [phone].    ATENCIÓN: Si habla español, tiene a sarmiento disposición servicios gratuitos de asistencia lingüística. Llame al [phone].    We comply with applicable federal civil rights laws and [place] laws. We do not discriminate on the basis of race, color, national origin, age, disability, sex, sexual orientation, " or gender identity.            Thank you!     Thank you for choosing [facility] UROLOGY AND [facility] PROSTATE AND UROLOGIC CANCERS  for your care. Our goal is always to provide you with excellent care. Hearing back from our patients is one way we can continue to improve our services. Please take a few minutes to complete the written survey that you may receive in the mail after your visit with us. Thank you!             Your Updated Medication List - Protect others around you: Learn how to safely use, store and throw away your medicines at www.disposemymeds.org.          This list is accurate as of: 10/30/17  9:52 AM.  Always use your most recent med list.                   Brand Name Dispense Instructions for use Diagnosis    acetaminophen 325 MG tablet    TYLENOL     Take 2 tablets (650 mg) by mouth every 4 hours as needed for mild pain, fever or headaches    Diffuse large B-cell lymphoma of extranodal site (H)       acyclovir 400 MG tablet    ZOVIRAX    60 tablet    Take 1 tablet (400 mg) by mouth 2 times daily    Diffuse large B-cell lymphoma, unspecified body region (H)       ascorbic acid 500 MG Tabs     30 tablet    Take 1 tablet (500 mg) by mouth daily    Diffuse large B-cell lymphoma, unspecified body region (H)       atenolol 25 MG tablet    TENORMIN    60 tablet    Take 1 tablet (25 mg) by mouth 2 times daily    Atrial tachycardia (H)       BENADRYL ALLERGY PO      Take 50 mg by mouth        calcium polycarbophil 625 MG tablet    FIBERCON     Take 1 tablet by mouth 2 times daily        calcium-vitamin D 600-400 MG-UNIT per tablet    CALTRATE    60 tablet    Take 2 tablets by mouth every morning    Diffuse large B-cell lymphoma, unspecified body region (H)       cetirizine 10 MG tablet    zyrTEC     Take 10 mg by mouth daily        digoxin 125 MCG tablet    LANOXIN    30 tablet    Take 1 tablet (125 mcg) by mouth daily    Atrial tachycardia (H)       enoxaparin 40 MG/0.4ML injection    LOVENOX    30 Syringe     Inject 0.4 mLs (40 mg) Subcutaneous daily    VTE (venous thromboembolism), Paroxysmal atrial fibrillation (H)       fluconazole 100 MG tablet    DIFLUCAN    30 tablet    Take 1 tablet (100 mg) by mouth daily    Diffuse large B-cell lymphoma, unspecified body region (H)       gabapentin 100 MG capsule    NEURONTIN    120 capsule    Take 2 capsules (200 mg) by mouth 3 times daily    Critical illness myopathy       HYDROCORTISONE PO      Take 100 mg by mouth IV        lidocaine visc 2% 2.5mL/5mL & maalox/mylanta w/ simeth 2.5mL/5mL & diphenhydrAMINE 5mg/5mL Susp suspension    [facility]     Swish and swallow 10 mLs in mouth 2 times daily        multivitamin, therapeutic with minerals Tabs tablet     30 each    Take 1 tablet by mouth daily    Diffuse large B-cell lymphoma, unspecified body region (H)       ondansetron 8 MG ODT tab    ZOFRAN-ODT    20 tablet    Take 1 tablet (8 mg) by mouth every 8 hours as needed    Diffuse large B-cell lymphoma of extranodal site (H)       potassium & sodium phosphates 280-160-250 MG Packet    NEUTRA-PHOS    60 packet    Take 1 packet by mouth 2 times daily    Hypophosphatemia       potassium chloride SA 20 MEQ CR tablet    K-DUR/KLOR-CON M    30 tablet    Take 1 tablet (20 mEq) by mouth daily while taking furosemide (Lasix). You can decrease potassium to 20 mEq daily once off Lasix.    Diffuse large B-cell lymphoma, unspecified body region (H)       predniSONE 5 MG tablet    DELTASONE    30 tablet    Take 1 tablet (5 mg) by mouth daily    Diffuse large B-cell lymphoma, unspecified body region (H)       tolterodine 4 MG 24 hr capsule    DETROL LA    30 capsule    Take 1 capsule (4 mg) by mouth daily    Urinary urgency       traMADol 50 MG tablet    ULTRAM    30 tablet    Take 1 tablet (50 mg) by mouth every 6 hours as needed for moderate pain    Diffuse large B-cell lymphoma of extranodal site (H)

## 2017-11-02 ENCOUNTER — DOCUMENTATION ONLY (OUTPATIENT)
Dept: ORTHOPEDICS | Facility: CLINIC | Age: 57
End: 2017-11-02

## 2017-11-02 NOTE — PROGRESS NOTES
S)  Patient stated the lateral sabolich trim line was bothering her.  When asked specifically, where the pain was located at, she pointed at the distal end of the sabolich trim line and asked for this to be flair away from the skin and have padding added so she wouldn't feel the plastic edge.    O)  Slight thiago on the skin from impingement.    A)  Heated and flared the plastic edge away from the patient's skin.  Furthermore, I added 1/8 inch blue puff for a total contact fit.  The puff was adhered using double sided tape.  Patient stated she was happy with adjustment made today.  Adjustment has a total contact fit when patient was ambulating around.    P)  Provided patient with a business card should she have any future questions or comments.    Topher Herring CPO.

## 2017-11-03 ENCOUNTER — TELEPHONE (OUTPATIENT)
Dept: INTERVENTIONAL RADIOLOGY/VASCULAR | Facility: CLINIC | Age: 57
End: 2017-11-03

## 2017-11-03 RX ORDER — SODIUM CHLORIDE 9 MG/ML
1000 INJECTION, SOLUTION INTRAVENOUS CONTINUOUS PRN
Status: CANCELLED
Start: 2017-11-08

## 2017-11-03 RX ORDER — ACETAMINOPHEN 325 MG/1
650 TABLET ORAL ONCE
Status: CANCELLED
Start: 2017-11-08

## 2017-11-03 RX ORDER — DIPHENHYDRAMINE HYDROCHLORIDE 50 MG/ML
50 INJECTION INTRAMUSCULAR; INTRAVENOUS
Status: CANCELLED
Start: 2017-11-08

## 2017-11-03 RX ORDER — ALBUTEROL SULFATE 0.83 MG/ML
2.5 SOLUTION RESPIRATORY (INHALATION)
Status: CANCELLED | OUTPATIENT
Start: 2017-11-08

## 2017-11-03 RX ORDER — METHYLPREDNISOLONE SODIUM SUCCINATE 125 MG/2ML
125 INJECTION, POWDER, LYOPHILIZED, FOR SOLUTION INTRAMUSCULAR; INTRAVENOUS
Status: CANCELLED
Start: 2017-11-08

## 2017-11-03 RX ORDER — MEPERIDINE HYDROCHLORIDE 25 MG/ML
25 INJECTION INTRAMUSCULAR; INTRAVENOUS; SUBCUTANEOUS
Status: CANCELLED
Start: 2017-11-08

## 2017-11-03 RX ORDER — DIPHENHYDRAMINE HCL 50 MG
50 CAPSULE ORAL ONCE
Status: CANCELLED
Start: 2017-11-08

## 2017-11-03 RX ORDER — LORAZEPAM 2 MG/ML
0.5 INJECTION INTRAMUSCULAR EVERY 4 HOURS PRN
Status: CANCELLED
Start: 2017-11-08

## 2017-11-03 RX ORDER — EPINEPHRINE 1 MG/ML
0.3 INJECTION, SOLUTION, CONCENTRATE INTRAVENOUS EVERY 5 MIN PRN
Status: CANCELLED | OUTPATIENT
Start: 2017-11-08

## 2017-11-03 RX ORDER — PALONOSETRON 0.05 MG/ML
0.25 INJECTION, SOLUTION INTRAVENOUS ONCE
Status: CANCELLED
Start: 2017-11-08

## 2017-11-03 RX ORDER — EPINEPHRINE 0.3 MG/.3ML
0.3 INJECTION SUBCUTANEOUS EVERY 5 MIN PRN
Status: CANCELLED | OUTPATIENT
Start: 2017-11-08

## 2017-11-03 RX ORDER — MEPERIDINE HYDROCHLORIDE 25 MG/ML
25 INJECTION INTRAMUSCULAR; INTRAVENOUS; SUBCUTANEOUS EVERY 30 MIN PRN
Status: CANCELLED | OUTPATIENT
Start: 2017-11-08

## 2017-11-03 RX ORDER — ALBUTEROL SULFATE 90 UG/1
1-2 AEROSOL, METERED RESPIRATORY (INHALATION)
Status: CANCELLED
Start: 2017-11-08

## 2017-11-06 LAB
TRANSF REACT PLASRBC-IMP: NORMAL
TRANSF REACT PLASRBC-IMP: NORMAL

## 2017-11-07 ENCOUNTER — ONCOLOGY VISIT (OUTPATIENT)
Dept: ONCOLOGY | Facility: CLINIC | Age: 57
End: 2017-11-07
Attending: PHYSICIAN ASSISTANT
Payer: COMMERCIAL

## 2017-11-07 ENCOUNTER — APPOINTMENT (OUTPATIENT)
Dept: MEDSURG UNIT | Facility: CLINIC | Age: 57
End: 2017-11-07
Attending: PHYSICIAN ASSISTANT
Payer: COMMERCIAL

## 2017-11-07 ENCOUNTER — DOCUMENTATION ONLY (OUTPATIENT)
Dept: ONCOLOGY | Facility: CLINIC | Age: 57
End: 2017-11-07

## 2017-11-07 ENCOUNTER — HOSPITAL ENCOUNTER (OUTPATIENT)
Facility: CLINIC | Age: 57
Discharge: HOME | End: 2017-11-07
Attending: INTERNAL MEDICINE | Admitting: INTERNAL MEDICINE
Payer: COMMERCIAL

## 2017-11-07 ENCOUNTER — HOSPITAL ENCOUNTER (OUTPATIENT)
Dept: GENERAL RADIOLOGY | Facility: CLINIC | Age: 57
End: 2017-11-07
Attending: PHYSICIAN ASSISTANT
Payer: COMMERCIAL

## 2017-11-07 VITALS
OXYGEN SATURATION: 98 % | SYSTOLIC BLOOD PRESSURE: 138 MMHG | RESPIRATION RATE: 18 BRPM | DIASTOLIC BLOOD PRESSURE: 68 MMHG | HEART RATE: 72 BPM | TEMPERATURE: 98.2 F

## 2017-11-07 VITALS
HEART RATE: 59 BPM | BODY MASS INDEX: 25.1 KG/M2 | RESPIRATION RATE: 16 BRPM | SYSTOLIC BLOOD PRESSURE: 122 MMHG | WEIGHT: 120.1 LBS | DIASTOLIC BLOOD PRESSURE: 71 MMHG | OXYGEN SATURATION: 98 % | TEMPERATURE: 98.3 F

## 2017-11-07 DIAGNOSIS — C83.30 DIFFUSE LARGE B-CELL LYMPHOMA, UNSPECIFIED BODY REGION (H): ICD-10-CM

## 2017-11-07 DIAGNOSIS — C83.30 DIFFUSE LARGE B-CELL LYMPHOMA, UNSPECIFIED BODY REGION (H): Primary | ICD-10-CM

## 2017-11-07 DIAGNOSIS — C83.398 DIFFUSE LARGE B-CELL LYMPHOMA OF EXTRANODAL SITE: Primary | ICD-10-CM

## 2017-11-07 DIAGNOSIS — C83.398 DIFFUSE LARGE B-CELL LYMPHOMA OF EXTRANODAL SITE: ICD-10-CM

## 2017-11-07 DIAGNOSIS — E83.39 HYPOPHOSPHATEMIA: ICD-10-CM

## 2017-11-07 DIAGNOSIS — G72.81 CRITICAL ILLNESS MYOPATHY: ICD-10-CM

## 2017-11-07 DIAGNOSIS — Z23 INFLUENZA VACCINE NEEDED: ICD-10-CM

## 2017-11-07 LAB
ALBUMIN SERPL-MCNC: 3.6 G/DL (ref 3.4–5)
ALP SERPL-CCNC: 110 U/L (ref 40–150)
ALT SERPL W P-5'-P-CCNC: 44 U/L (ref 0–50)
ANION GAP SERPL CALCULATED.3IONS-SCNC: 6 MMOL/L (ref 3–14)
AST SERPL W P-5'-P-CCNC: 32 U/L (ref 0–45)
BASOPHILS # BLD AUTO: 0.1 10E9/L (ref 0–0.2)
BASOPHILS NFR BLD AUTO: 0.9 %
BILIRUB SERPL-MCNC: 0.3 MG/DL (ref 0.2–1.3)
BUN SERPL-MCNC: 13 MG/DL (ref 7–30)
CALCIUM SERPL-MCNC: 10.2 MG/DL (ref 8.5–10.1)
CHLORIDE SERPL-SCNC: 106 MMOL/L (ref 94–109)
CO2 SERPL-SCNC: 29 MMOL/L (ref 20–32)
COPATH REPORT: NORMAL
CREAT SERPL-MCNC: 0.61 MG/DL (ref 0.52–1.04)
DIFFERENTIAL METHOD BLD: ABNORMAL
EOSINOPHIL # BLD AUTO: 0 10E9/L (ref 0–0.7)
EOSINOPHIL NFR BLD AUTO: 0.7 %
ERYTHROCYTE [DISTWIDTH] IN BLOOD BY AUTOMATED COUNT: 13.7 % (ref 10–15)
GFR SERPL CREATININE-BSD FRML MDRD: >90 ML/MIN/1.7M2
GLUCOSE CSF-MCNC: 42 MG/DL (ref 40–70)
GLUCOSE SERPL-MCNC: 93 MG/DL (ref 70–99)
GRAM STN SPEC: NORMAL
HCT VFR BLD AUTO: 35 % (ref 35–47)
HGB BLD-MCNC: 11.8 G/DL (ref 11.7–15.7)
IMM GRANULOCYTES # BLD: 0.2 10E9/L (ref 0–0.4)
IMM GRANULOCYTES NFR BLD: 3.2 %
INR BLD: 1 (ref 0.86–1.14)
LYMPHOCYTES # BLD AUTO: 0.5 10E9/L (ref 0.8–5.3)
LYMPHOCYTES NFR BLD AUTO: 9.9 %
MCH RBC QN AUTO: 39.7 PG (ref 26.5–33)
MCHC RBC AUTO-ENTMCNC: 33.7 G/DL (ref 31.5–36.5)
MCV RBC AUTO: 118 FL (ref 78–100)
MONOCYTES # BLD AUTO: 1 10E9/L (ref 0–1.3)
MONOCYTES NFR BLD AUTO: 18.1 %
NEUTROPHILS # BLD AUTO: 3.6 10E9/L (ref 1.6–8.3)
NEUTROPHILS NFR BLD AUTO: 67.2 %
NRBC # BLD AUTO: 0 10*3/UL
NRBC BLD AUTO-RTO: 0 /100
PLATELET # BLD AUTO: 103 10E9/L (ref 150–450)
POTASSIUM SERPL-SCNC: 4.1 MMOL/L (ref 3.4–5.3)
PROT CSF-MCNC: 67 MG/DL (ref 15–60)
PROT SERPL-MCNC: 6.2 G/DL (ref 6.8–8.8)
RBC # BLD AUTO: 2.97 10E12/L (ref 3.8–5.2)
SODIUM SERPL-SCNC: 141 MMOL/L (ref 133–144)
SPECIMEN SOURCE: NORMAL
WBC # BLD AUTO: 5.4 10E9/L (ref 4–11)

## 2017-11-07 PROCEDURE — 80053 COMPREHEN METABOLIC PANEL: CPT | Performed by: PHYSICIAN ASSISTANT

## 2017-11-07 PROCEDURE — 40000166 ZZH STATISTIC PP CARE STAGE 1

## 2017-11-07 PROCEDURE — 87205 SMEAR GRAM STAIN: CPT | Performed by: PHYSICIAN ASSISTANT

## 2017-11-07 PROCEDURE — 96450 CHEMOTHERAPY INTO CNS: CPT | Performed by: PHYSICIAN ASSISTANT

## 2017-11-07 PROCEDURE — 84157 ASSAY OF PROTEIN OTHER: CPT | Performed by: PHYSICIAN ASSISTANT

## 2017-11-07 PROCEDURE — 96450 CHEMOTHERAPY INTO CNS: CPT | Mod: ZP | Performed by: PHYSICIAN ASSISTANT

## 2017-11-07 PROCEDURE — 99212 OFFICE O/P EST SF 10 MIN: CPT | Mod: ZF

## 2017-11-07 PROCEDURE — G0008 ADMIN INFLUENZA VIRUS VAC: HCPCS

## 2017-11-07 PROCEDURE — 88185 FLOWCYTOMETRY/TC ADD-ON: CPT | Performed by: PHYSICIAN ASSISTANT

## 2017-11-07 PROCEDURE — 25000128 H RX IP 250 OP 636: Performed by: INTERNAL MEDICINE

## 2017-11-07 PROCEDURE — 85610 PROTHROMBIN TIME: CPT | Mod: QW

## 2017-11-07 PROCEDURE — 99214 OFFICE O/P EST MOD 30 MIN: CPT | Mod: 25 | Performed by: PHYSICIAN ASSISTANT

## 2017-11-07 PROCEDURE — 85025 COMPLETE CBC W/AUTO DIFF WBC: CPT | Performed by: PHYSICIAN ASSISTANT

## 2017-11-07 PROCEDURE — 40001004 ZZHCL STATISTIC FLOW INT 9-15 ABY TC 88188: Performed by: PHYSICIAN ASSISTANT

## 2017-11-07 PROCEDURE — 25000125 ZZHC RX 250: Performed by: RADIOLOGY

## 2017-11-07 PROCEDURE — 89050 BODY FLUID CELL COUNT: CPT | Performed by: PHYSICIAN ASSISTANT

## 2017-11-07 PROCEDURE — 90686 IIV4 VACC NO PRSV 0.5 ML IM: CPT | Mod: ZF | Performed by: INTERNAL MEDICINE

## 2017-11-07 PROCEDURE — 87070 CULTURE OTHR SPECIMN AEROBIC: CPT | Performed by: PHYSICIAN ASSISTANT

## 2017-11-07 PROCEDURE — 25000128 H RX IP 250 OP 636: Mod: ZF | Performed by: INTERNAL MEDICINE

## 2017-11-07 PROCEDURE — 36415 COLL VENOUS BLD VENIPUNCTURE: CPT

## 2017-11-07 PROCEDURE — 88184 FLOWCYTOMETRY/ TC 1 MARKER: CPT | Performed by: PHYSICIAN ASSISTANT

## 2017-11-07 PROCEDURE — 82945 GLUCOSE OTHER FLUID: CPT | Performed by: PHYSICIAN ASSISTANT

## 2017-11-07 PROCEDURE — 96450 CHEMOTHERAPY INTO CNS: CPT

## 2017-11-07 RX ORDER — LIDOCAINE HYDROCHLORIDE 10 MG/ML
30 INJECTION, SOLUTION EPIDURAL; INFILTRATION; INTRACAUDAL; PERINEURAL ONCE
Status: COMPLETED | OUTPATIENT
Start: 2017-11-07 | End: 2017-11-07

## 2017-11-07 RX ORDER — GABAPENTIN 100 MG/1
200 CAPSULE ORAL 3 TIMES DAILY
Qty: 120 CAPSULE | Refills: 0 | Status: SHIPPED | OUTPATIENT
Start: 2017-11-07 | End: 2017-11-29

## 2017-11-07 RX ORDER — CETIRIZINE HYDROCHLORIDE 10 MG/1
10 TABLET ORAL DAILY
Qty: 30 TABLET | Refills: 1 | Status: SHIPPED | OUTPATIENT
Start: 2017-11-07 | End: 2017-12-12

## 2017-11-07 RX ADMIN — METHOTREXATE: 25 INJECTION, SOLUTION INTRA-ARTERIAL; INTRAMUSCULAR; INTRATHECAL; INTRAVENOUS at 11:28

## 2017-11-07 RX ADMIN — LIDOCAINE HYDROCHLORIDE 50 MG: 10 INJECTION, SOLUTION EPIDURAL; INFILTRATION; INTRACAUDAL; PERINEURAL at 11:21

## 2017-11-07 RX ADMIN — INFLUENZA A VIRUS A/MICHIGAN/45/2015 X-275 (H1N1) ANTIGEN (FORMALDEHYDE INACTIVATED), INFLUENZA A VIRUS A/HONG KONG/4801/2014 X-263B (H3N2) ANTIGEN (FORMALDEHYDE INACTIVATED), INFLUENZA B VIRUS B/PHUKET/3073/2013 ANTIGEN (FORMALDEHYDE INACTIVATED), AND INFLUENZA B VIRUS B/BRISBANE/60/2008 ANTIGEN (FORMALDEHYDE INACTIVATED) 0.5 ML: 15; 15; 15; 15 INJECTION, SUSPENSION INTRAMUSCULAR at 07:59

## 2017-11-07 ASSESSMENT — PAIN SCALES - GENERAL: PAINLEVEL: NO PAIN (0)

## 2017-11-07 NOTE — PROGRESS NOTES
Heme/onc visit note  October 19, 2017    Reason for visit: follow up DLBCL, here for cycle 4 R-CHOP    Cancer history: Amelia Michel is a 56 year old female with DLBCL. She had a complicated diagnosis as below:    1. History of Rheumatoid Arthritis status post long term treatment with methotrexate with recent significant complicated course with cardiac arrest, admission with hypotension, sepsis, ICU stay and intubation with subsequent additional readmission from TCU in 6/2017 for FUO with extensive work-up with eventual finding of progression cytopenias with eventual lymphoma diagnosis  2. Bone Marrow Biopsy: 7/12/2017: Highly suspicious for involvement by B cell lymphoma with foci of large atypical CD20+ cells  3. PET CT 7/19/2017: Extensive activity: Right paratracheal lesion with SUV 28, many liver lesions with SUV 15, cardiac lesion intraarterial septum, numerous bone lesions.  4. 7/21 Liver Biopsy: Consistent with Diffuse Large B Cell Lymphoma non-germinal center phenotype  -- FISH for myc, bcl2, bcl6 negative for double-hit lymphoma  5. IPI based on Age < 60 (0 points) + PS 2 (1 + point) + Stage IV Disease (1 point) + Extranodal disease > 1 site (1 point) + elevated LDH (1 point) = 4 Poor Risk Disease  6. Cycle 1 given as R-EPOCH Day 1 = 7/27/2017  -- complications of transfusion dependence and need for acute rehab placement (likely more result of extensive hospital stays)  - LP negative for CNS involvement 8/23/2017  7. Cycle #2: Transition to R-CHOP Day 1 = 8/22/2017  - Day 15 high dose MTX for CNS prophylaxis  - complicated by significant fluid overload and PICC associated DVT  8. Cycle #3 Day 1 = 9/20/2017  9. PET/CT 10/9 shows a complete remission by Lugano criteria.     Interval history:    Overall doing well . She continues to have right leg neuropathy. Neurontin helps. She denies edema she walks with walker. She takes frequent short walks.  Her wound on her left arm is healing well. She changes  dressing daily and  has weekly wound care nurse visits. She denies fever chills or sweats. She has chronic loose stools that started with her RA medication. She report no changes in stools, she denies  NV, denies abdominal pain, denies blood in stool, she tolerates po well. Her wt is stable. She denies adenopathy. She denies skin rash.    Review Of Systems   10 point ROS of systems including Constitutional, Eyes, Respiratory, Cardiovascular, Gastroenterology, Genitourinary, Integumentary, Musculoskeletal, Psychiatric were all negative except for pertinent positives noted in my HPI.    Past medical history:  Patient Active Problem List   Diagnosis     HTN (hypertension)     Primary pulmonary hypertension (H)     Hyperlipidemia LDL goal <130     Other rheumatoid arthritis with rheumatoid factor of multiple sites     Lymphedema of both lower extremities     Atrial tachycardia (H)     Cardiogenic shock (H)     Acute respiratory failure with hypoxia (H)     Hypertension     Critical illness myopathy     Sepsis (H)     Wound of left upper extremity     Diffuse large B-cell lymphoma of extranodal site (H)     Diffuse large B cell lymphoma (H)     Febrile neutropenia (H)     Physical deconditioning     Health Care Home     DLBCL (diffuse large B cell lymphoma) (H)     H/O cardiac arrest       Medications:    No current facility-administered medications on file prior to visit.   Current Outpatient Prescriptions on File Prior to Visit:  tolterodine (DETROL LA) 4 MG 24 hr capsule Take 1 capsule (4 mg) by mouth daily   acyclovir (ZOVIRAX) 400 MG tablet Take 1 tablet (400 mg) by mouth 2 times daily   atenolol (TENORMIN) 25 MG tablet Take 1 tablet (25 mg) by mouth 2 times daily   digoxin (LANOXIN) 125 MCG tablet Take 1 tablet (125 mcg) by mouth daily   fluconazole (DIFLUCAN) 100 MG tablet Take 1 tablet (100 mg) by mouth daily   potassium chloride SA (K-DUR/KLOR-CON M) 20 MEQ CR tablet Take 1 tablet (20 mEq) by mouth daily while  taking furosemide (Lasix). You can decrease potassium to 20 mEq daily once off Lasix.   enoxaparin (LOVENOX) 40 MG/0.4ML injection Inject 0.4 mLs (40 mg) Subcutaneous daily   DiphenhydrAMINE HCl (BENADRYL ALLERGY PO) Take 50 mg by mouth   HYDROCORTISONE PO Take 100 mg by mouth IV   magic mouthwash suspension (diphenhydrAMINE, lidocaine, aluminum-magnesium & simethicone) Swish and swallow 10 mLs in mouth 2 times daily   acetaminophen (TYLENOL) 325 MG tablet Take 2 tablets (650 mg) by mouth every 4 hours as needed for mild pain, fever or headaches   ondansetron (ZOFRAN-ODT) 8 MG ODT tab Take 1 tablet (8 mg) by mouth every 8 hours as needed   calcium polycarbophil (FIBERCON) 625 MG tablet Take 1 tablet by mouth 2 times daily   calcium-vitamin D (CALTRATE) 600-400 MG-UNIT per tablet Take 2 tablets by mouth every morning   multivitamin, therapeutic with minerals (THERA-VIT-M) TABS tablet Take 1 tablet by mouth daily   ascorbic acid 500 MG TABS Take 1 tablet (500 mg) by mouth daily   predniSONE (DELTASONE) 5 MG tablet Take 1 tablet (5 mg) by mouth daily (Patient not taking: Reported on 11/7/2017)   traMADol (ULTRAM) 50 MG tablet Take 1 tablet (50 mg) by mouth every 6 hours as needed for moderate pain (Patient not taking: Reported on 11/7/2017)   [DISCONTINUED] gabapentin (NEURONTIN) 100 MG capsule Take 2 capsules (200 mg) by mouth 3 times daily       Allergy:     Allergies   Allergen Reactions     Blood Transfusion Related (Informational Only) Hives     Hives with platelets. Give benadryl premedication.     Metoprolol Other (See Comments)     Pt and  report that metoprolol does not work for her and also reports feeling unwell with this medication. She has been able to tolerate atenolol, which as worked in controlling her HR.      No Clinical Screening - See Comments      Penicillin Allergy Skin Test not performed, see antimicrobial management team progress note 7/5/17.       Penicillins      Tape [Adhesive Tape]  Rash       Physical exam  /71 (BP Location: Right arm, Patient Position: Sitting, Cuff Size: Adult Regular)  Pulse 59  Temp 98.3  F (36.8  C) (Oral)  Resp 16  Wt 54.5 kg (120 lb 1.6 oz)  SpO2 98%  BMI 25.1 kg/m2  Wt Readings from Last 4 Encounters:   11/07/17 54.5 kg (120 lb 1.6 oz)   10/30/17 52.8 kg (116 lb 6.4 oz)   10/19/17 56.2 kg (123 lb 12.8 oz)   10/11/17 56 kg (123 lb 6.4 oz)     Gen: Fatigued but non-toxic appearing. KPS 70-80  HEENT: one small ulcer at the base of her front lower tooth gum line. No thrush  Lungs: slightly diminished in the bases but otherwise clear  CV: Regular on my exam today with 3/6 KRISTY at LUSB and LLSB  Abd: soft, NT  Ext: no edema. Left forarm wound covered but the patient told me that the wound looks smaller and and is healing well.     Labs:  Results for orders placed or performed in visit on 11/07/17 (from the past 24 hour(s))   *CBC with platelets differential   Result Value Ref Range    WBC 5.4 4.0 - 11.0 10e9/L    RBC Count 2.97 (L) 3.8 - 5.2 10e12/L    Hemoglobin 11.8 11.7 - 15.7 g/dL    Hematocrit 35.0 35.0 - 47.0 %     (H) 78 - 100 fl    MCH 39.7 (H) 26.5 - 33.0 pg    MCHC 33.7 31.5 - 36.5 g/dL    RDW 13.7 10.0 - 15.0 %    Platelet Count 103 (L) 150 - 450 10e9/L    Diff Method Automated Method     % Neutrophils 67.2 %    % Lymphocytes 9.9 %    % Monocytes 18.1 %    % Eosinophils 0.7 %    % Basophils 0.9 %    % Immature Granulocytes 3.2 %    Nucleated RBCs 0 0 /100    Absolute Neutrophil 3.6 1.6 - 8.3 10e9/L    Absolute Lymphocytes 0.5 (L) 0.8 - 5.3 10e9/L    Absolute Monocytes 1.0 0.0 - 1.3 10e9/L    Absolute Eosinophils 0.0 0.0 - 0.7 10e9/L    Absolute Basophils 0.1 0.0 - 0.2 10e9/L    Abs Immature Granulocytes 0.2 0 - 0.4 10e9/L    Absolute Nucleated RBC 0.0    Comprehensive metabolic panel   Result Value Ref Range    Sodium 141 133 - 144 mmol/L    Potassium 4.1 3.4 - 5.3 mmol/L    Chloride 106 94 - 109 mmol/L    Carbon Dioxide 29 20 - 32 mmol/L     Anion Gap 6 3 - 14 mmol/L    Glucose 93 70 - 99 mg/dL    Urea Nitrogen 13 7 - 30 mg/dL    Creatinine 0.61 0.52 - 1.04 mg/dL    GFR Estimate >90 >60 mL/min/1.7m2    GFR Estimate If Black >90 >60 mL/min/1.7m2    Calcium 10.2 (H) 8.5 - 10.1 mg/dL    Bilirubin Total 0.3 0.2 - 1.3 mg/dL    Albumin 3.6 3.4 - 5.0 g/dL    Protein Total 6.2 (L) 6.8 - 8.8 g/dL    Alkaline Phosphatase 110 40 - 150 U/L    ALT 44 0 - 50 U/L    AST 32 0 - 45 U/L       Assessment and plan:  Amelia Michel is a 56 year old woman with      Lymphoma: Got cycle #1 in the hospital and R-EPOCH chosen for infusional nature due to possible intracardiac involvement and also extensive disease to allow for slower lymphoma kill. Tolerated this well, aside from significant cytopenias (which were present at diagnosis). Then transitioned to R-CHOP to finish out a total of 6 cycles of chemotherapy (1 R-EPOCH and 5 R-CHOP) with initially planned for High Dose MTX on Day 15 of cycle 2,4, and 6 due to high IPI.   She had significant toxicity with the high dose methotrexate with PICC associated clot, significant volume overload, increased neuropathy and thus moving forward Dr Rodriguez is planning on doing a couple of IT chemo as the prophylaxis given the risk/benefit ratio.  -Cycle 5 R-CHOP  to start tomorrow  Today she will get IT MTX. She held Lovenox this am.   She will return in 3 weeks for f/u prior to cycle 6. Day 20 IT chemotherapy will be cytarabine.     ID: Remains on acyclovir and fluconazole. Abx when ANC<1      Rheumatoid arthritis: is on prednisone 5 mg daily. Will continue this in between her chemo sessions.       CV: A. Fib. On digoxin and atenolol. Now on lovenox for DVT so anticoagulated. Will need to start aspirin if the lovenox is stopped.      PICC Associated RUE DVT: she was on therapetic lovenox and US 10/16 showed the clot cleared. She wishes to take ppx dose lovenox. Will hold this before procedures of if plt<50K.     Neuropathy -  Had it  before chemo. stable continue with Neurontin. She is discharged from PT.     Left side arm wound - related to IV access in the past. She will continue with daily dressing changes and follow up with wound care nurse weekly. Per patient wound is getting smaller.  Dressing intact. Clean and dry.    HM- give flu shot today     The patient was seen in conjunction with Jose Montalvo NP who served as a scribe for today's visit. I have reviewed and edited the note and agree with the above findings and plan.  Kelly Mcginnis PA-C

## 2017-11-07 NOTE — IP AVS SNAPSHOT
MRN:[id number]                      After Visit Summary   11/7/2017    Amelia Michel    MRN: [id number]           Visit Information        Department      11/7/2017  8:24 AM [address] [facility]          Review of your medicines      UNREVIEWED medicines. Ask your doctor about these medicines        Dose / Directions    acetaminophen 325 MG tablet   Commonly known as:  TYLENOL   Used for:  Diffuse large B-cell lymphoma of extranodal site (H)        Dose:  650 mg   Take 2 tablets (650 mg) by mouth every 4 hours as needed for mild pain, fever or headaches   Refills:  0       acyclovir 400 MG tablet   Commonly known as:  ZOVIRAX   Indication:  Medication Treatment to Prevent Cytomegalovirus Disease   Used for:  Diffuse large B-cell lymphoma, unspecified body region (H)        Dose:  400 mg   Take 1 tablet (400 mg) by mouth 2 times daily   Quantity:  60 tablet   Refills:  1       ascorbic acid 500 MG Tabs   Used for:  Diffuse large B-cell lymphoma, unspecified body region (H)        Dose:  500 mg   Take 1 tablet (500 mg) by mouth daily   Quantity:  30 tablet   Refills:  0       atenolol 25 MG tablet   Commonly known as:  TENORMIN   Used for:  Atrial tachycardia (H)        Dose:  25 mg   Take 1 tablet (25 mg) by mouth 2 times daily   Quantity:  60 tablet   Refills:  1       BENADRYL ALLERGY PO        Dose:  50 mg   Take 50 mg by mouth   Refills:  0       calcium polycarbophil 625 MG tablet   Commonly known as:  FIBERCON   Indication:  loose stool        Dose:  1 tablet   Take 1 tablet by mouth 2 times daily   Refills:  0       calcium-vitamin D 600-400 MG-UNIT per tablet   Commonly known as:  CALTRATE   Used for:  Diffuse large B-cell lymphoma, unspecified body region (H)        Dose:  2 tablet   Take 2 tablets by mouth every morning   Quantity:  60 tablet   Refills:  0       cetirizine 10 MG tablet   Commonly known as:  zyrTEC   Used for:  Hypophosphatemia        Dose:  10 mg   Take 1 tablet (10  mg) by mouth daily   Quantity:  30 tablet   Refills:  1       digoxin 125 MCG tablet   Commonly known as:  LANOXIN   Used for:  Atrial tachycardia (H)        Dose:  125 mcg   Take 1 tablet (125 mcg) by mouth daily   Quantity:  30 tablet   Refills:  0       enoxaparin 40 MG/0.4ML injection   Commonly known as:  LOVENOX   Used for:  VTE (venous thromboembolism), Paroxysmal atrial fibrillation (H)        Dose:  40 mg   Inject 0.4 mLs (40 mg) Subcutaneous daily   Quantity:  30 Syringe   Refills:  0       fluconazole 100 MG tablet   Commonly known as:  DIFLUCAN   Used for:  Diffuse large B-cell lymphoma, unspecified body region (H)        Dose:  100 mg   Take 1 tablet (100 mg) by mouth daily   Quantity:  30 tablet   Refills:  1       gabapentin 100 MG capsule   Commonly known as:  NEURONTIN   Used for:  Critical illness myopathy        Dose:  200 mg   Take 2 capsules (200 mg) by mouth 3 times daily   Quantity:  120 capsule   Refills:  0       HYDROCORTISONE PO        Dose:  100 mg   Take 100 mg by mouth IV   Refills:  0       lidocaine visc 2% 2.5mL/5mL & maalox/mylanta w/ simeth 2.5mL/5mL & diphenhydrAMINE 5mg/5mL Susp suspension   Commonly known as:  MAGIC [facility]        Dose:  10 mL   Swish and swallow 10 mLs in mouth 2 times daily   Refills:  0       multivitamin, therapeutic with minerals Tabs tablet   Used for:  Diffuse large B-cell lymphoma, unspecified body region (H)        Dose:  1 tablet   Take 1 tablet by mouth daily   Quantity:  30 each   Refills:  0       ondansetron 8 MG ODT tab   Commonly known as:  ZOFRAN-ODT   Used for:  Diffuse large B-cell lymphoma of extranodal site (H)        Dose:  8 mg   Take 1 tablet (8 mg) by mouth every 8 hours as needed   Quantity:  20 tablet   Refills:  2       potassium & sodium phosphates 280-160-250 MG Packet   Commonly known as:  NEUTRA-PHOS   Used for:  Hypophosphatemia        Dose:  1 packet   Take 1 packet by mouth 2 times daily   Quantity:  60 packet    Refills:  1       potassium chloride SA 20 MEQ CR tablet   Commonly known as:  K-DUR/KLOR-CON M   Used for:  Diffuse large B-cell lymphoma, unspecified body region (H)        Dose:  20 mEq   Take 1 tablet (20 mEq) by mouth daily while taking furosemide (Lasix). You can decrease potassium to 20 mEq daily once off Lasix.   Quantity:  30 tablet   Refills:  0       predniSONE 5 MG tablet   Commonly known as:  DELTASONE   Used for:  Diffuse large B-cell lymphoma, unspecified body region (H)        Dose:  5 mg   Take 1 tablet (5 mg) by mouth daily   Quantity:  30 tablet   Refills:  0       tolterodine 4 MG 24 hr capsule   Commonly known as:  DETROL LA   Used for:  Urinary urgency        Dose:  4 mg   Take 1 capsule (4 mg) by mouth daily   Quantity:  30 capsule   Refills:  5       traMADol 50 MG tablet   Commonly known as:  ULTRAM   Used for:  Diffuse large B-cell lymphoma of extranodal site (H)        Dose:  50 mg   Take 1 tablet (50 mg) by mouth every 6 hours as needed for moderate pain   Quantity:  30 tablet   Refills:  0                Protect others around you: Learn how to safely use, store and throw away your medicines at www.disposemymeds.org.         Follow-ups after your visit        Your next 10 appointments already scheduled     Nov 08, 2017  8:00 AM CST   Infusion 360 with UC ONCOLOGY INFUSION, UC 15 ATC   [facility] Cancer [facility])    [address] [place] [id number] [phone]            Nov 29, 2017  8:00 AM CST   [facility] Lab Draw with  [facility] LAB DRAW   [facility] Lab Draw ([facility])    [address] [place] [id number] [phone]            Nov 29, 2017  8:30 AM CST   Infusion 360 with UC ONCOLOGY INFUSION, UC 20 ATC   [facility] ([facility])    [address] [place] [id number] [phone]             Jan 08, 2018 10:30 AM CST   (Arrive by 10:15 AM)   Return Visit with Archana Green PA-C   [facility] Urology and Inst for Prostate and Urologic Cancers ([facility])    [address] [place] [address]  4th [facility] [address]   [phone]            Jan 12, 2018  8:00 AM CST   (Arrive by 7:45 AM)   Return Visit with Pj Cunha MD   [facility] Rheumatology ([facility])    [address] [place] [address]  3rd [facility] [address] [phone]            Jan 31, 2018 10:00 AM CST   (Arrive by 9:45 AM)   RETURN ARRHYTHMIA with Juan Eaton MD   [facility] Heart Care ([facility])    [address] [place] [address]  3rd [facility] [address] [phone]               Care Instructions        After Care Instructions     Activity: Bedrest OUTPATIENT       Bedrest with head of bed flat for 1 hour then discharge. Patient is allowed to have head up for meals and for bathroom privileges only.                  Further instructions from your care team                                                              [facility]                                      Radiology Discharge instructions Post Lumbar Puncture with Chemotherapy         AFTER YOU GO HOME      Relax and take it easy for 24 hours    We suggest bedrest until the next morning, except to go to the bathroom.    You may resume normal activity tomorrow    You may remove the bandage on your back in the evening or next morning    You may resume bathing the next day    Drink at least 4 glasses of extra fluid today if not on a fluid restriction    DO NOT drive or operate machinery at home or at work for at least 24 hours                   CALL YOUR PRIMARY PHYSICIAN IF:    If you start to leak a large amount of fluid from the puncture site, lie down flat on your back. Call your primary physician, they will tell you if you need or return to the hosptial    You  develop a severe headache    You develop nausea or vomiting     You develop a temperature of 101 degrees or greater                      [facility] RADIOLOGY DEPARTMENT             Procedure Physician:  Dr. Hsieh                                Date of Procedure:November 7, 2017               [facility]...........[phone].......Ask for the Radiologist on call. Someone is on call 24 hour/day               [facility] Toll Free Number.........[phone].....Monday-Friday 8:00 am to 4:30 pm    .                                           Additional Information About Your Visit        MyChart Information     [employer] gives you secure access to your electronic health record. If you see a primary care provider, you can also send messages to your care team and make appointments. If you have questions, please call your primary care clinic.  If you do not have a primary care provider, please call [phone] and they will assist you.        Care EveryWhere ID     This is your Care EveryWhere ID. This could be used by other organizations to access your [facility] medical records  [id number]        Your Vitals Were     Blood Pressure Pulse Temperature Respirations Pulse Oximetry       145/76 (BP Location: Right arm) 66 98.2  F (36.8  C) (Oral) 18 98%        Primary Care Provider Office Phone # Fax #    Comfort Sabillon [phone]      Equal Access to Services     [facility] AH: Hadii laurita ku hadasho Soomaali, waaxda luqadaha, qaybta kaalmada adeegyada, durga price hayfloydn jseica hermosillo . So [facility] [phone].    ATENCIÓN: Si habla español, tiene a sarmiento disposición servicios gratuitos de asistencia lingüística. Llame al [phone].    We comply with applicable federal civil rights laws and [place] laws. We do not discriminate on the basis of race, color, national origin, age, disability, sex, sexual orientation, or gender identity.            Thank you!     Thank you for choosing [facility] for your care. Our goal is  always to provide you with excellent care. Hearing back from our patients is one way we can continue to improve our services. Please take a few minutes to complete the written survey that you may receive in the mail after you visit with us. Thank you!             Medication List: This is a list of all your medications and when to take them. Check marks below indicate your daily home schedule. Keep this list as a reference.      Medications           Morning Afternoon Evening Bedtime As Needed    acetaminophen 325 MG tablet   Commonly known as:  TYLENOL   Take 2 tablets (650 mg) by mouth every 4 hours as needed for mild pain, fever or headaches                                acyclovir 400 MG tablet   Commonly known as:  ZOVIRAX   Take 1 tablet (400 mg) by mouth 2 times daily                                ascorbic acid 500 MG Tabs   Take 1 tablet (500 mg) by mouth daily                                atenolol 25 MG tablet   Commonly known as:  TENORMIN   Take 1 tablet (25 mg) by mouth 2 times daily                                BENADRYL ALLERGY PO   Take 50 mg by mouth                                calcium polycarbophil 625 MG tablet   Commonly known as:  FIBERCON   Take 1 tablet by mouth 2 times daily                                calcium-vitamin D 600-400 MG-UNIT per tablet   Commonly known as:  CALTRATE   Take 2 tablets by mouth every morning                                cetirizine 10 MG tablet   Commonly known as:  zyrTEC   Take 1 tablet (10 mg) by mouth daily                                digoxin 125 MCG tablet   Commonly known as:  LANOXIN   Take 1 tablet (125 mcg) by mouth daily                                enoxaparin 40 MG/0.4ML injection   Commonly known as:  LOVENOX   Inject 0.4 mLs (40 mg) Subcutaneous daily                                fluconazole 100 MG tablet   Commonly known as:  DIFLUCAN   Take 1 tablet (100 mg) by mouth daily                                gabapentin 100 MG capsule    Commonly known as:  NEURONTIN   Take 2 capsules (200 mg) by mouth 3 times daily                                HYDROCORTISONE PO   Take 100 mg by mouth IV                                lidocaine visc 2% 2.5mL/5mL & maalox/mylanta w/ simeth 2.5mL/5mL & diphenhydrAMINE 5mg/5mL Susp suspension   Commonly known as:  [facility]   Swish and swallow 10 mLs in mouth 2 times daily                                multivitamin, therapeutic with minerals Tabs tablet   Take 1 tablet by mouth daily                                ondansetron 8 MG ODT tab   Commonly known as:  ZOFRAN-ODT   Take 1 tablet (8 mg) by mouth every 8 hours as needed                                potassium & sodium phosphates 280-160-250 MG Packet   Commonly known as:  NEUTRA-PHOS   Take 1 packet by mouth 2 times daily                                potassium chloride SA 20 MEQ CR tablet   Commonly known as:  K-DUR/KLOR-CON M   Take 1 tablet (20 mEq) by mouth daily while taking furosemide (Lasix). You can decrease potassium to 20 mEq daily once off Lasix.                                predniSONE 5 MG tablet   Commonly known as:  DELTASONE   Take 1 tablet (5 mg) by mouth daily                                tolterodine 4 MG 24 hr capsule   Commonly known as:  DETROL LA   Take 1 capsule (4 mg) by mouth daily                                traMADol 50 MG tablet   Commonly known as:  ULTRAM   Take 1 tablet (50 mg) by mouth every 6 hours as needed for moderate pain

## 2017-11-07 NOTE — PROGRESS NOTES
Tolerated bedrest without problems.  Denies pain, headache, or neurological changes.  Tolerated food, fluids, urination and ambulation all without problems.  Reviewed discharge instructions with patient.  Discharged to home with .

## 2017-11-07 NOTE — MR AVS SNAPSHOT
After Visit Summary   11/7/2017    Amelia Michel    MRN: [id number]           Patient Information     Date Of Birth          1960        Visit Information        Provider Department      11/7/2017 7:00 AM Kelly Mcginnis PA-C [facility]        Today's Diagnoses     Diffuse large B-cell lymphoma of extranodal site (H)    -  1    Hypophosphatemia        Critical illness myopathy        Influenza vaccine needed           Follow-ups after your visit        Your next 10 appointments already scheduled     Nov 29, 2017  8:00 AM CST   [facility] Lab Draw with UC [facility] LAB DRAW   [facility] Lab Draw ([facility])    [address]  2nd [facility] [id number] [phone]            Nov 29, 2017  8:30 AM CST   Infusion 360 with  ONCOLOGY INFUSION, UC 20 ATC   [facility] ([facility])    [address]  2nd [facility] [id number] [phone]            Jan 08, 2018 10:30 AM CST   (Arrive by 10:15 AM)   Return Visit with Archana Green PA-C   [facility] Urology and Inst for Prostate and Urologic Cancers ([facility])    [address] [place] [address]  4th Floor  [facility] [id number] [phone]            Jan 12, 2018  8:00 AM CST   (Arrive by 7:45 AM)   Return Visit with Pj Cunha MD   [facility] Rheumatology ([facility])    [address] [place] [address]  3rd [facility] [id number] [phone]            Jan 31, 2018 10:00 AM CST   (Arrive by 9:45 AM)   RETURN ARRHYTHMIA with Juan Eaton MD   [facility] Heart Care ([facility])    [address] [place] [address]  3rd [facility] [id number] [phone]              Who to contact     If you have questions or need follow up information about today's clinic visit or your schedule please contact [facility] CANCER [facility] directly at  [phone].  Normal or non-critical lab and imaging results will be communicated to you by MyChart, letter or phone within 4 business days after the clinic has received the results. If you do not hear from us within 7 days, please contact the clinic through [facility] [employer] or phone. If you have a critical or abnormal lab result, we will notify you by phone as soon as possible.  Submit refill requests through [facility] or call your pharmacy and they will forward the refill request to us. Please allow 3 business days for your refill to be completed.          Additional Information About Your Visit        [employer] Information     [employer] gives you secure access to your electronic health record. If you see a primary care provider, you can also send messages to your care team and make appointments. If you have questions, please call your primary care clinic.  If you do not have a primary care provider, please call [phone] and they will assist you.        Care EveryWhere ID     This is your Care EveryWhere ID. This could be used by other organizations to access your [facility] medical records  [id number]        Your Vitals Were     Pulse Temperature Respirations Pulse Oximetry BMI (Body Mass Index)       59 98.3  F (36.8  C) (Oral) 16 98% 25.1 kg/m2        Blood Pressure from Last 3 Encounters:   11/08/17 115/67   11/07/17 138/68   11/07/17 122/71    Weight from Last 3 Encounters:   11/07/17 54.5 kg (120 lb 1.6 oz)   10/30/17 52.8 kg (116 lb 6.4 oz)   10/19/17 56.2 kg (123 lb 12.8 oz)                 Where to get your medicines      These medications were sent to [facility] [place], [place] - [address]  [address], [facility] [address]    Hours:  TRANSPLANT PHONE NUMBER [phone] Phone:  [phone]     cetirizine 10 MG tablet    gabapentin 100 MG capsule    potassium & sodium phosphates 280-160-250 MG Packet          Primary Care Provider Office Phone # Fax #     Comfort Sabillon [phone]       [address] [facility] [address]        Equal Access to Services     CATINA HEAD : Kay laurita mcguire prachi Flores, watashda erickaqtania, emelyta kaowenda catailno, durga barriosall henny. So [facility] [phone].    ATENCIÓN: Si habla español, tiene a sarmiento disposición servicios gratuitos de asistencia lingüística. Llame al [phone].    We comply with applicable federal civil rights laws and [place] laws. We do not discriminate on the basis of race, color, national origin, age, disability, sex, sexual orientation, or gender identity.            Thank you!     Thank you for choosing [facility] CANCER [facility]  for your care. Our goal is always to provide you with excellent care. Hearing back from our patients is one way we can continue to improve our services. Please take a few minutes to complete the written survey that you may receive in the mail after your visit with us. Thank you!             Your Updated Medication List - Protect others around you: Learn how to safely use, store and throw away your medicines at www.disposemymeds.org.          This list is accurate as of: 11/7/17  8:24 AM.  Always use your most recent med list.                   Brand Name Dispense Instructions for use Diagnosis    acetaminophen 325 MG tablet    TYLENOL     Take 2 tablets (650 mg) by mouth every 4 hours as needed for mild pain, fever or headaches    Diffuse large B-cell lymphoma of extranodal site (H)       acyclovir 400 MG tablet    ZOVIRAX    60 tablet    Take 1 tablet (400 mg) by mouth 2 times daily    Diffuse large B-cell lymphoma, unspecified body region (H)       ascorbic acid 500 MG Tabs     30 tablet    Take 1 tablet (500 mg) by mouth daily    Diffuse large B-cell lymphoma, unspecified body region (H)       atenolol 25 MG tablet    TENORMIN    60 tablet    Take 1 tablet (25 mg) by mouth 2 times daily    Atrial tachycardia (H)       BENADRYL ALLERGY  PO      Take 50 mg by mouth        calcium polycarbophil 625 MG tablet    FIBERCON     Take 1 tablet by mouth 2 times daily        calcium-vitamin D 600-400 MG-UNIT per tablet    CALTRATE    60 tablet    Take 2 tablets by mouth every morning    Diffuse large B-cell lymphoma, unspecified body region (H)       cetirizine 10 MG tablet    zyrTEC    30 tablet    Take 1 tablet (10 mg) by mouth daily    Hypophosphatemia       digoxin 125 MCG tablet    LANOXIN    30 tablet    Take 1 tablet (125 mcg) by mouth daily    Atrial tachycardia (H)       enoxaparin 40 MG/0.4ML injection    LOVENOX    30 Syringe    Inject 0.4 mLs (40 mg) Subcutaneous daily    VTE (venous thromboembolism), Paroxysmal atrial fibrillation (H)       fluconazole 100 MG tablet    DIFLUCAN    30 tablet    Take 1 tablet (100 mg) by mouth daily    Diffuse large B-cell lymphoma, unspecified body region (H)       gabapentin 100 MG capsule    NEURONTIN    120 capsule    Take 2 capsules (200 mg) by mouth 3 times daily    Critical illness myopathy       HYDROCORTISONE PO      Take 100 mg by mouth IV        lidocaine visc 2% 2.5mL/5mL & maalox/mylanta w/ simeth 2.5mL/5mL & diphenhydrAMINE 5mg/5mL Susp suspension    [facility]     Swish and swallow 10 mLs in mouth 2 times daily        multivitamin, therapeutic with minerals Tabs tablet     30 each    Take 1 tablet by mouth daily    Diffuse large B-cell lymphoma, unspecified body region (H)       ondansetron 8 MG ODT tab    ZOFRAN-ODT    20 tablet    Take 1 tablet (8 mg) by mouth every 8 hours as needed    Diffuse large B-cell lymphoma of extranodal site (H)       potassium & sodium phosphates 280-160-250 MG Packet    NEUTRA-PHOS    60 packet    Take 1 packet by mouth 2 times daily    Hypophosphatemia       potassium chloride SA 20 MEQ CR tablet    K-DUR/KLOR-CON M    30 tablet    Take 1 tablet (20 mEq) by mouth daily while taking furosemide (Lasix). You can decrease potassium to 20 mEq daily once off  Lasix.    Diffuse large B-cell lymphoma, unspecified body region (H)       predniSONE 5 MG tablet    DELTASONE    30 tablet    Take 1 tablet (5 mg) by mouth daily    Diffuse large B-cell lymphoma, unspecified body region (H)       tolterodine 4 MG 24 hr capsule    DETROL LA    30 capsule    Take 1 capsule (4 mg) by mouth daily    Urinary urgency       traMADol 50 MG tablet    ULTRAM    30 tablet    Take 1 tablet (50 mg) by mouth every 6 hours as needed for moderate pain    Diffuse large B-cell lymphoma of extranodal site (H)

## 2017-11-07 NOTE — LETTER
11/7/2017      RE: Amelia Michel  [address]  [facility] [address]       Heme/onc visit note  October 19, 2017    Reason for visit: follow up DLBCL, here for cycle 4 R-CHOP    Cancer history: Amelia Michel is a 56 year old female with DLBCL. She had a complicated diagnosis as below:    1. History of Rheumatoid Arthritis status post long term treatment with methotrexate with recent significant complicated course with cardiac arrest, admission with hypotension, sepsis, ICU stay and intubation with subsequent additional readmission from TCU in 6/2017 for FUO with extensive work-up with eventual finding of progression cytopenias with eventual lymphoma diagnosis  2. Bone Marrow Biopsy: 7/12/2017: Highly suspicious for involvement by B cell lymphoma with foci of large atypical CD20+ cells  3. PET CT 7/19/2017: Extensive activity: Right paratracheal lesion with SUV 28, many liver lesions with SUV 15, cardiac lesion intraarterial septum, numerous bone lesions.  4. 7/21 Liver Biopsy: Consistent with Diffuse Large B Cell Lymphoma non-germinal center phenotype  -- FISH for myc, bcl2, bcl6 negative for double-hit lymphoma  5. IPI based on Age < 60 (0 points) + PS 2 (1 + point) + Stage IV Disease (1 point) + Extranodal disease > 1 site (1 point) + elevated LDH (1 point) = 4 Poor Risk Disease  6. Cycle 1 given as R-EPOCH Day 1 = 7/27/2017  -- complications of transfusion dependence and need for acute rehab placement (likely more result of extensive hospital stays)  - LP negative for CNS involvement 8/23/2017  7. Cycle #2: Transition to R-CHOP Day 1 = 8/22/2017  - Day 15 high dose MTX for CNS prophylaxis  - complicated by significant fluid overload and PICC associated DVT  8. Cycle #3 Day 1 = 9/20/2017  9. PET/CT 10/9 shows a complete remission by Lugano criteria.     Interval history:    Overall doing well . She continues to have right leg neuropathy. Neurontin helps. She denies edema she walks with walker. She  takes frequent short walks.  Her wound on her left arm is healing well. She changes dressing daily and  has weekly wound care nurse visits. She denies fever chills or sweats. She has chronic loose stools that started with her RA medication. She report no changes in stools, she denies  NV, denies abdominal pain, denies blood in stool, she tolerates po well. Her wt is stable. She denies adenopathy. She denies skin rash.    Review Of Systems   10 point ROS of systems including Constitutional, Eyes, Respiratory, Cardiovascular, Gastroenterology, Genitourinary, Integumentary, Musculoskeletal, Psychiatric were all negative except for pertinent positives noted in my HPI.    Past medical history:  Patient Active Problem List   Diagnosis     HTN (hypertension)     Primary pulmonary hypertension (H)     Hyperlipidemia LDL goal <130     Other rheumatoid arthritis with rheumatoid factor of multiple sites     Lymphedema of both lower extremities     Atrial tachycardia (H)     Cardiogenic shock (H)     Acute respiratory failure with hypoxia (H)     Hypertension     Critical illness myopathy     Sepsis (H)     Wound of left upper extremity     Diffuse large B-cell lymphoma of extranodal site (H)     Diffuse large B cell lymphoma (H)     Febrile neutropenia (H)     Physical deconditioning     Health Care Home     DLBCL (diffuse large B cell lymphoma) (H)     H/O cardiac arrest       Medications:    No current facility-administered medications on file prior to visit.   Current Outpatient Prescriptions on File Prior to Visit:  tolterodine (DETROL LA) 4 MG 24 hr capsule Take 1 capsule (4 mg) by mouth daily   acyclovir (ZOVIRAX) 400 MG tablet Take 1 tablet (400 mg) by mouth 2 times daily   atenolol (TENORMIN) 25 MG tablet Take 1 tablet (25 mg) by mouth 2 times daily   digoxin (LANOXIN) 125 MCG tablet Take 1 tablet (125 mcg) by mouth daily   fluconazole (DIFLUCAN) 100 MG tablet Take 1 tablet (100 mg) by mouth daily   potassium chloride  SA (K-DUR/KLOR-CON M) 20 MEQ CR tablet Take 1 tablet (20 mEq) by mouth daily while taking furosemide (Lasix). You can decrease potassium to 20 mEq daily once off Lasix.   enoxaparin (LOVENOX) 40 MG/0.4ML injection Inject 0.4 mLs (40 mg) Subcutaneous daily   DiphenhydrAMINE HCl (BENADRYL ALLERGY PO) Take 50 mg by mouth   HYDROCORTISONE PO Take 100 mg by mouth IV   magic mouthwash suspension (diphenhydrAMINE, lidocaine, aluminum-magnesium & simethicone) Swish and swallow 10 mLs in mouth 2 times daily   acetaminophen (TYLENOL) 325 MG tablet Take 2 tablets (650 mg) by mouth every 4 hours as needed for mild pain, fever or headaches   ondansetron (ZOFRAN-ODT) 8 MG ODT tab Take 1 tablet (8 mg) by mouth every 8 hours as needed   calcium polycarbophil (FIBERCON) 625 MG tablet Take 1 tablet by mouth 2 times daily   calcium-vitamin D (CALTRATE) 600-400 MG-UNIT per tablet Take 2 tablets by mouth every morning   multivitamin, therapeutic with minerals (THERA-VIT-M) TABS tablet Take 1 tablet by mouth daily   ascorbic acid 500 MG TABS Take 1 tablet (500 mg) by mouth daily   predniSONE (DELTASONE) 5 MG tablet Take 1 tablet (5 mg) by mouth daily (Patient not taking: Reported on 11/7/2017)   traMADol (ULTRAM) 50 MG tablet Take 1 tablet (50 mg) by mouth every 6 hours as needed for moderate pain (Patient not taking: Reported on 11/7/2017)   [DISCONTINUED] gabapentin (NEURONTIN) 100 MG capsule Take 2 capsules (200 mg) by mouth 3 times daily       Allergy:     Allergies   Allergen Reactions     Blood Transfusion Related (Informational Only) Hives     Hives with platelets. Give benadryl premedication.     Metoprolol Other (See Comments)     Pt and  report that metoprolol does not work for her and also reports feeling unwell with this medication. She has been able to tolerate atenolol, which as worked in controlling her HR.      No Clinical Screening - See Comments      Penicillin Allergy Skin Test not performed, see antimicrobial  management team progress note 7/5/17.       Penicillins      Tape [Adhesive Tape] Rash       Physical exam  /71 (BP Location: Right arm, Patient Position: Sitting, Cuff Size: Adult Regular)  Pulse 59  Temp 98.3  F (36.8  C) (Oral)  Resp 16  Wt 54.5 kg (120 lb 1.6 oz)  SpO2 98%  BMI 25.1 kg/m2  Wt Readings from Last 4 Encounters:   11/07/17 54.5 kg (120 lb 1.6 oz)   10/30/17 52.8 kg (116 lb 6.4 oz)   10/19/17 56.2 kg (123 lb 12.8 oz)   10/11/17 56 kg (123 lb 6.4 oz)     Gen: Fatigued but non-toxic appearing. KPS 70-80  HEENT: one small ulcer at the base of her front lower tooth gum line. No thrush  Lungs: slightly diminished in the bases but otherwise clear  CV: Regular on my exam today with 3/6 KRISTY at LUSB and LLSB  Abd: soft, NT  Ext: no edema. Left forarm wound covered but the patient told me that the wound looks smaller and and is healing well.     Labs:  Results for orders placed or performed in visit on 11/07/17 (from the past 24 hour(s))   *CBC with platelets differential   Result Value Ref Range    WBC 5.4 4.0 - 11.0 10e9/L    RBC Count 2.97 (L) 3.8 - 5.2 10e12/L    Hemoglobin 11.8 11.7 - 15.7 g/dL    Hematocrit 35.0 35.0 - 47.0 %     (H) 78 - 100 fl    MCH 39.7 (H) 26.5 - 33.0 pg    MCHC 33.7 31.5 - 36.5 g/dL    RDW 13.7 10.0 - 15.0 %    Platelet Count 103 (L) 150 - 450 10e9/L    Diff Method Automated Method     % Neutrophils 67.2 %    % Lymphocytes 9.9 %    % Monocytes 18.1 %    % Eosinophils 0.7 %    % Basophils 0.9 %    % Immature Granulocytes 3.2 %    Nucleated RBCs 0 0 /100    Absolute Neutrophil 3.6 1.6 - 8.3 10e9/L    Absolute Lymphocytes 0.5 (L) 0.8 - 5.3 10e9/L    Absolute Monocytes 1.0 0.0 - 1.3 10e9/L    Absolute Eosinophils 0.0 0.0 - 0.7 10e9/L    Absolute Basophils 0.1 0.0 - 0.2 10e9/L    Abs Immature Granulocytes 0.2 0 - 0.4 10e9/L    Absolute Nucleated RBC 0.0    Comprehensive metabolic panel   Result Value Ref Range    Sodium 141 133 - 144 mmol/L    Potassium 4.1 3.4 - 5.3  mmol/L    Chloride 106 94 - 109 mmol/L    Carbon Dioxide 29 20 - 32 mmol/L    Anion Gap 6 3 - 14 mmol/L    Glucose 93 70 - 99 mg/dL    Urea Nitrogen 13 7 - 30 mg/dL    Creatinine 0.61 0.52 - 1.04 mg/dL    GFR Estimate >90 >60 mL/min/1.7m2    GFR Estimate If Black >90 >60 mL/min/1.7m2    Calcium 10.2 (H) 8.5 - 10.1 mg/dL    Bilirubin Total 0.3 0.2 - 1.3 mg/dL    Albumin 3.6 3.4 - 5.0 g/dL    Protein Total 6.2 (L) 6.8 - 8.8 g/dL    Alkaline Phosphatase 110 40 - 150 U/L    ALT 44 0 - 50 U/L    AST 32 0 - 45 U/L       Assessment and plan:  Amelia Michel is a 56 year old woman with      Lymphoma: Got cycle #1 in the hospital and R-EPOCH chosen for infusional nature due to possible intracardiac involvement and also extensive disease to allow for slower lymphoma kill. Tolerated this well, aside from significant cytopenias (which were present at diagnosis). Then transitioned to R-CHOP to finish out a total of 6 cycles of chemotherapy (1 R-EPOCH and 5 R-CHOP) with initially planned for High Dose MTX on Day 15 of cycle 2,4, and 6 due to high IPI.   She had significant toxicity with the high dose methotrexate with PICC associated clot, significant volume overload, increased neuropathy and thus moving forward Dr Rodriguez is planning on doing a couple of IT chemo as the prophylaxis given the risk/benefit ratio.  -Cycle 5 R-CHOP  to start tomorrow  Today she will get IT MTX. She held Lovenox this am.   She will return in 3 weeks for f/u prior to cycle 6. Day 20 IT chemotherapy will be cytarabine.     ID: Remains on acyclovir and fluconazole. Abx when ANC<1      Rheumatoid arthritis: is on prednisone 5 mg daily. Will continue this in between her chemo sessions.       CV: A. Fib. On digoxin and atenolol. Now on lovenox for DVT so anticoagulated. Will need to start aspirin if the lovenox is stopped.      PICC Associated RUE DVT: she was on therapetic lovenox and US 10/16 showed the clot cleared. She wishes to take ppx dose  lovenox. Will hold this before procedures of if plt<50K.     Neuropathy -  Had it before chemo. stable continue with Neurontin. She is discharged from PT.     Left side arm wound - related to IV access in the past. She will continue with daily dressing changes and follow up with wound care nurse weekly. Per patient wound is getting smaller.  Dressing intact. Clean and dry.    HM- give flu shot today     The patient was seen in conjunction with Jose Montalvo NP who served as a scribe for today's visit. I have reviewed and edited the note and agree with the above findings and plan.  Kelly Mcginnis PA-C

## 2017-11-07 NOTE — PROCEDURES
[facility], [place]     Neuroradiology Procedure Note     Lumbar Puncture Under Fluoroscopic Guidance:      11/7/2017 12:27 PM    Performed by: Zev Encarnacion MD  Authorized by: MD Zev Hamlin MD    Indications: IT Chemotherapeutic    Consent given by: Patient who states understanding of the procedure being performed after discussing the risks, benefits and alternatives.    Prior to the start of the procedure and with procedural staff participation, I verbally confirmed the patient s identity using two indicators, relevant allergies, that the procedure was appropriate and matched the consent or emergent situation, and that the correct equipment/implants were available. Immediately prior to starting the procedure I conducted the Time Out with the procedural staff and re-confirmed the patient s name, procedure, and site/side. (The Joint Commission universal protocol was followed.) Yes    Procedure:    Under sterile conditions the patient was positioned lying prone. Using fluoroscopy, needle entrance side was defined.  Betadine solution and sterile drapes were utilized.  Local anesthetic at the site: 5 ml of lidocaine 1% without epinephrine.    A 22 gauge 5 inch spinal needle was inserted at the L3-L4 interspace.  Opening Pressurewas not checked.  A total of 12mL of clear and colorless spinal fluid was obtained and sent to the laboratory.   After the needle was removed, a bandaid and pressure were applied and the patient was instructed to stay horizontal until the results were back.    Complications:  None  Patient tolerance: Patient tolerated the procedure well with no immediate complications.    Condition: Stable Patient is transferred to Outpatient unit for post procedure observation.    Can Ozutemiz 11/7/2017 12:27 PM

## 2017-11-07 NOTE — NURSING NOTE
"Oncology Rooming Note    November 7, 2017 7:13 AM   Amelia Michel is a 56 year old female who presents for:    Chief Complaint   Patient presents with     Blood Draw     labs drawn by vpt by rn.  vs taken.     Oncology Clinic Visit     Return for Lymphoma      Initial Vitals: /71 (BP Location: Right arm, Patient Position: Sitting, Cuff Size: Adult Regular)  Pulse 59  Temp 98.3  F (36.8  C) (Oral)  Resp 16  Wt 54.5 kg (120 lb 1.6 oz)  SpO2 98%  BMI 25.1 kg/m2 Estimated body mass index is 25.1 kg/(m^2) as calculated from the following:    Height as of 10/30/17: 1.473 m (4' 10\").    Weight as of this encounter: 54.5 kg (120 lb 1.6 oz). Body surface area is 1.49 meters squared.  No Pain (0) Comment: Data Unavailable   No LMP recorded. Patient is postmenopausal.  Allergies reviewed: Yes  Medications reviewed: Yes    Medications: MEDICATION REFILLS NEEDED TODAY. Provider was notified.  Pharmacy name entered into [facility]:    [facility] [place], [place] - [address] [facility] [place], [place] - [address] [place] [address] PHARM[address] [place] [address] - [address] [facility]  [place], [place] - [address] [facility] [phone]    Clinical concerns: refills , Gabapentin, Zyrtec, Neutrophos  Rahel  was notified.    6 minutes for nursing intake (face to face time)     Tammi Santos MA                "

## 2017-11-07 NOTE — NURSING NOTE
Chief Complaint   Patient presents with     Blood Draw     labs drawn by vpt by rn.  vs taken.     Labs drawn by vpt by rn.  Vital signs taken.  Monalisa Byrd RN

## 2017-11-07 NOTE — PROGRESS NOTES
Returned from xray s/p LP with Chemo insertion.  VSS.  Denies  Numbness or tingling.  Denies pain.  Low back site CDI.  Denies headache.

## 2017-11-07 NOTE — IP AVS SNAPSHOT
[address] [address]                                       After Visit Summary   11/7/2017    Amelia Michel    MRN: [id number]           After Visit Summary Signature Page     I have received my discharge instructions, and my questions have been answered. I have discussed any challenges I see with this plan with the nurse or doctor.    ..........................................................................................................................................  Patient/Patient Representative Signature      ..........................................................................................................................................  Patient Representative Print Name and Relationship to Patient    ..................................................               ................................................  Date                                            Time    ..........................................................................................................................................  Reviewed by Signature/Title    ...................................................              ..............................................  Date                                                            Time

## 2017-11-07 NOTE — MR AVS SNAPSHOT
After Visit Summary   11/7/2017    Amelia Michel    MRN: [id number]           Patient Information     Date Of Birth          1960        Visit Information        Provider Department      11/7/2017 11:40 AM Kalpana Alvarado PA-C [facility]        Today's Diagnoses     Diffuse large B-cell lymphoma, unspecified body region (H)    -  1       Follow-ups after your visit        Your next 10 appointments already scheduled     Nov 08, 2017  8:00 AM CST   Infusion 360 with UC ONCOLOGY INFUSION, UC 15 ATC   [facility] ([facility])    [address] [place] [id number]   [phone]            Nov 29, 2017  8:00 AM CST   [facility] Lab Draw with  [facility] LAB DRAW   [facility] Lab Draw ([facility])    [address] [place] [id number]   [phone]            Nov 29, 2017  8:30 AM CST   Infusion 360 with UC ONCOLOGY INFUSION, UC 20 ATC   [facility] ([facility])    [address] [place] [id number]   [phone]            Jan 08, 2018 10:30 AM CST   (Arrive by 10:15 AM)   Return Visit with Archana Green PA-C   [facility] ([facility])    [address] [place] [id number] [phone]            Jan 12, 2018  8:00 AM CST   (Arrive by 7:45 AM)   Return Visit with Pj Cunha MD   [facility] ([facility])    [address] [place] [id number] [phone]            Jan 31, 2018 10:00 AM CST   (Arrive by 9:45 AM)   RETURN ARRHYTHMIA with Juan Eaton MD   [facility] ([facility])    [address] [place] [id number] [phone]              Who to contact     If  you have questions or need follow up information about today's clinic visit or your schedule please contact [facility] directly at [phone].  Normal or non-critical lab and imaging results will be communicated to you by MyChart, letter or phone within 4 business days after the clinic has received the results. If you do not hear from us within 7 days, please contact the clinic through MyChart or phone. If you have a critical or abnormal lab result, we will notify you by phone as soon as possible.  Submit refill requests through [employer] or call your pharmacy and they will forward the refill request to us. Please allow 3 business days for your refill to be completed.          Additional Information About Your Visit        [employer] Information     [employer] gives you secure access to your electronic health record. If you see a primary care provider, you can also send messages to your care team and make appointments. If you have questions, please call your primary care clinic.  If you do not have a primary care provider, please call [phone] and they will assist you.        Care EveryWhere ID     This is your Care EveryWhere ID. This could be used by other organizations to access your [facility] medical records  [id number]         Blood Pressure from Last 3 Encounters:   11/07/17 138/68   11/07/17 122/71   10/30/17 126/73    Weight from Last 3 Encounters:   11/07/17 54.5 kg (120 lb 1.6 oz)   10/30/17 52.8 kg (116 lb 6.4 oz)   10/19/17 56.2 kg (123 lb 12.8 oz)              Today, you had the following     No orders found for display         Today's Medication Changes      Notice     This visit is during an admission. Changes to the med list made in this visit will be reflected in the After Visit Summary of the admission.             Primary Care Provider Office Phone # Fax #    Comfort Sabillon [phone] [address] [address]        Equal Access to Services      CATINA HEAD : Hadii aad shayla prachi Flores, watashda luqadaha, qajustinta kadelbert franckchelaalana, durga nielsenmerryjose alejandro hermosillo . So [facility] [phone].    ATENCIÓN: Si habla español, tiene a sarmiento disposición servicios gratuitos de asistencia lingüística. Llame al [phone].    We comply with applicable federal civil rights laws and [place] laws. We do not discriminate on the basis of race, color, national origin, age, disability, sex, sexual orientation, or gender identity.            Thank you!     Thank you for choosing [facility]  for your care. Our goal is always to provide you with excellent care. Hearing back from our patients is one way we can continue to improve our services. Please take a few minutes to complete the written survey that you may receive in the mail after your visit with us. Thank you!             Your Updated Medication List - Protect others around you: Learn how to safely use, store and throw away your medicines at www.disposemymeds.org.      Notice     This visit is during an admission. Changes to the med list made in this visit will be reflected in the After Visit Summary of the admission.

## 2017-11-07 NOTE — DISCHARGE INSTRUCTIONS
[facility]                                      Radiology Discharge instructions Post Lumbar Puncture with Chemotherapy         AFTER YOU GO HOME      Relax and take it easy for 24 hours    We suggest bedrest until the next morning, except to go to the bathroom.    You may resume normal activity tomorrow    You may remove the bandage on your back in the evening or next morning    You may resume bathing the next day    Drink at least 4 glasses of extra fluid today if not on a fluid restriction    DO NOT drive or operate machinery at home or at work for at least 24 hours                   CALL YOUR PRIMARY PHYSICIAN IF:    If you start to leak a large amount of fluid from the puncture site, lie down flat on your back. Call your primary physician, they will tell you if you need or return to the hosptial    You develop a severe headache    You develop nausea or vomiting     You develop a temperature of 101 degrees or greater                      [facility] RADIOLOGY DEPARTMENT             Procedure Physician:  Dr. Hsieh                                Date of Procedure:November 7, 2017               [facility]...........[phone].......Ask for the Radiologist on call. Someone is on call 24 hour/day               [facility] Toll Free Number.........[phone].....Monday-Friday 8:00 am to 4:30 pm    .

## 2017-11-07 NOTE — PROGRESS NOTES
ONC Adult Lumbar Puncture and Intrathecal Chemotherapy Administration Procedure Note    November 7, 2017    Procedure: Lumbar Puncture to obtain CSF and give intrathecal chemotherapy    Diagnosis: DLBCL      Informed Consent was performed in INTERVENTIONAL RADIOLOGY: Procedure, benefits, risks, and alternatives explained to the patient who voiced understanding of the information and agreed to proceed with lumbar puncture. Risks include bleeding, headache, and or infection.     Procedure: completed by interventional radiology    I gave patient IT Methotrexate 12 mg in 6 mL volume, preservative free.  It was pushed intrathecally through IR from 3-5 minutes without complication.     Disposition: Patient was sent to recovery in 2A.   Avoid soaking in a tub tonight.  Call if develops headaches, fevers and or chills.       Kalpana Alvarado PA-C

## 2017-11-07 NOTE — NURSING NOTE

## 2017-11-07 NOTE — PROGRESS NOTES
Form Request Documentation    Date Received in Clinic:  11/7/17  Name/Type of Form: Medical Certification & Workability Form  Questions that need to be addressed:   Current Employment Status: not currently working, having last worked on 5/29/17   Amount of Leave Requested: Continuous Leave beginning 5/29/17 through February 2018   Other: None  Date Completed: 11/7/2017  Copy Mailed to patient: Yes on 11/7/2017  Disposition of Form: Fax to [facility] Management Team at [phone] on 11/7/17

## 2017-11-07 NOTE — PROGRESS NOTES
Telephone Call [facility]  8/16/2017         Disease History:  1. Complicated patient with history of Rheumatoid Arthritis status post long term treatment with methotrexate with recent significant complicated course with cardiac arrest, admission with hypotension, sepsis, ICU stay and intubation with subsequent additional readmission from TCU in 6/2017 for FUO with extensive work-up with eventual finding of progression cytopenias with eventual lymphoma diagnosis  2. Bone Marrow Biopsy: 7/12/2017: Highly suspicious for involvement by B cell lymphoma with foci of large atypical CD20+ cells  3. PET CT 7/19/2017: Extensive activity: Right paratracheal lesion with SUV 28, many liver lesions with SUV 15, cardiac lesion intraarterial septum, numerous bone lesions.  4. 7/21 Liver Biopsy: Consistent with Diffuse Large B Cell Lymphoma non-germinal center phenotype  -- FISH for myc, bcl2, bcl6 negative for double-hit lymphoma  5. IPI based on Age < 60 (0 points) + PS 2 (1 + point) + Stage IV Disease (1 point) + Extranodal disease > 1 site (1 point) + elevated LDH (1 point) = 4 Poor Risk Disease  6. Cycle 1 given as R-EPOCH Day 1 = 7/27/2017      HPI: 55 yo woman with complicated medical issues that I met in the hospital after extensive stay post cardiac arrest at home, FUO, and eventual diagnosis of aggressive large cell lymphoma. I met her on 7D, she tolerated chemo, has improved dramatically, spent time at acute rehab, did have complication of strep mitis bacteremia and is possibly being discharged this weekend. We talked on the phone today to discuss the plan moving forward.      Labs:  Results for FIDELIA MIDDLETON (MRN [id number]) as of 8/16/2017 13:03   Ref. Range 8/16/2017 08:11   Sodium Latest Ref Range: 133 - 144 mmol/L 142   Potassium Latest Ref Range: 3.4 - 5.3 mmol/L 3.9   Chloride Latest Ref Range: 94 - 109 mmol/L 110 (H)   Carbon Dioxide Latest Ref Range: 20 - 32 mmol/L 21   Urea Nitrogen Latest Ref Range: 7  - 30 mg/dL 12   Creatinine Latest Ref Range: 0.52 - 1.04 mg/dL 0.43 (L)   GFR Estimate Latest Ref Range: >60 mL/min/1.7m2 >90   GFR Estimate If Black Latest Ref Range: >60 mL/min/1.7m2 >90   Calcium Latest Ref Range: 8.5 - 10.1 mg/dL 8.8   Anion Gap Latest Ref Range: 3 - 14 mmol/L 11   Albumin Latest Ref Range: 3.4 - 5.0 g/dL 3.3 (L)   Protein Total Latest Ref Range: 6.8 - 8.8 g/dL 6.5 (L)   Bilirubin Total Latest Ref Range: 0.2 - 1.3 mg/dL 0.9   Alkaline Phosphatase Latest Ref Range: 40 - 150 U/L 180 (H)   ALT Latest Ref Range: 0 - 50 U/L 87 (H)   AST Latest Ref Range: 0 - 45 U/L 40   Bilirubin Direct Latest Ref Range: 0.0 - 0.2 mg/dL 0.3 (H)   Glucose Latest Ref Range: 70 - 99 mg/dL 83   WBC Latest Ref Range: 4.0 - 11.0 10e9/L 5.3   Hemoglobin Latest Ref Range: 11.7 - 15.7 g/dL 9.8 (L)   Hematocrit Latest Ref Range: 35.0 - 47.0 % 30.4 (L)   Platelet Count Latest Ref Range: 150 - 450 10e9/L 49 (LL)       A/P: 55 yo woman with history of rhematoid arthritis and recent diagnosis of stage 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 Lymphoma: Got cycle #1 in the hospital and I chose R-EPOCH for infusional nature due to possible intracardiac involvement and also extensive disease to allow for slower lymphoma kill. Tolerated this well, aside from significant cytopenias (which were present at diagnosis) and counts are recovering and she is getting stronger. Moving forward will plan for R-CHOP to finish out a total of 6 cycles of chemotherapy (1 R-EPOCH and 5 R-CHOP) with High Dose MTX on Day 15 of cycle 2,4, and 6 due to high IPI. Reviewed this plan with Amelia and her  via phone today  -- She needs a diagnostic LP which we will plan for clinic around 8/21 or 8/22. Labs ordered for this including cell count and differential, glucose, protein, cytology and flow  -- Will then schedule R-CHOP as cycle #2 of treatment (slightly delayed due to being in acute rehab) for 8/23 or 8/24.   -- Needs a 24  hour urine creatinine clearance prior to methotrexate. This is ordered but she will need a urine collection jug when she comes in to clinic next week    2. ID: currently on rocephin at rehab for strep mitis infection through 8/22. Does have a PICC so will be able to do that at home. Remains on acyclovir and fluconazole    3. Rheumatoid arthritis: is on prednisone 5 mg daily. Will continue this in between her chemo sessions    4. CV: A. Fib. On digoxin and atenolol    5. Deconditioning: currently in acute rehab. Likely discharge later this week    Lenore Rodriguez     Patient tolerated procedure well.

## 2017-11-07 NOTE — Clinical Note
11/7/2017       RE: Amelia Michel  [address]  [facility] [address]     Dear Colleague,    Thank you for referring your patient, Amelia Michel, to the [facility]. Please see a copy of my visit note below.    No notes on file    Again, thank you for allowing me to participate in the care of your patient.      Sincerely,    Kelly Mcginnis PA-C

## 2017-11-08 ENCOUNTER — INFUSION THERAPY VISIT (OUTPATIENT)
Dept: ONCOLOGY | Facility: CLINIC | Age: 57
End: 2017-11-08
Attending: INTERNAL MEDICINE
Payer: COMMERCIAL

## 2017-11-08 VITALS
OXYGEN SATURATION: 100 % | DIASTOLIC BLOOD PRESSURE: 67 MMHG | RESPIRATION RATE: 18 BRPM | SYSTOLIC BLOOD PRESSURE: 115 MMHG | TEMPERATURE: 97.5 F

## 2017-11-08 DIAGNOSIS — C83.398 DIFFUSE LARGE B-CELL LYMPHOMA OF EXTRANODAL SITE: Primary | ICD-10-CM

## 2017-11-08 PROCEDURE — 25000132 ZZH RX MED GY IP 250 OP 250 PS 637: Mod: ZF | Performed by: INTERNAL MEDICINE

## 2017-11-08 PROCEDURE — 40000556 ZZH STATISTIC PERIPHERAL IV START W US GUIDANCE: Mod: ZF

## 2017-11-08 PROCEDURE — 96415 CHEMO IV INFUSION ADDL HR: CPT

## 2017-11-08 PROCEDURE — 96411 CHEMO IV PUSH ADDL DRUG: CPT

## 2017-11-08 PROCEDURE — 96375 TX/PRO/DX INJ NEW DRUG ADDON: CPT

## 2017-11-08 PROCEDURE — 25000128 H RX IP 250 OP 636: Mod: JW,ZF | Performed by: INTERNAL MEDICINE

## 2017-11-08 PROCEDURE — 96413 CHEMO IV INFUSION 1 HR: CPT

## 2017-11-08 PROCEDURE — 96377 APPLICATON ON-BODY INJECTOR: CPT | Mod: 59

## 2017-11-08 PROCEDURE — 96417 CHEMO IV INFUS EACH ADDL SEQ: CPT

## 2017-11-08 PROCEDURE — 96367 TX/PROPH/DG ADDL SEQ IV INF: CPT

## 2017-11-08 RX ORDER — PALONOSETRON 0.05 MG/ML
0.25 INJECTION, SOLUTION INTRAVENOUS ONCE
Status: COMPLETED | OUTPATIENT
Start: 2017-11-08 | End: 2017-11-08

## 2017-11-08 RX ORDER — DIPHENHYDRAMINE HCL 25 MG
50 CAPSULE ORAL ONCE
Status: COMPLETED | OUTPATIENT
Start: 2017-11-08 | End: 2017-11-08

## 2017-11-08 RX ORDER — PREDNISONE 50 MG/1
50 TABLET ORAL 2 TIMES DAILY
Qty: 10 TABLET | Refills: 0 | Status: SHIPPED | OUTPATIENT
Start: 2017-11-08 | End: 2017-11-13

## 2017-11-08 RX ORDER — ACETAMINOPHEN 325 MG/1
650 TABLET ORAL ONCE
Status: COMPLETED | OUTPATIENT
Start: 2017-11-08 | End: 2017-11-08

## 2017-11-08 RX ADMIN — DIPHENHYDRAMINE HYDROCHLORIDE 50 MG: 25 CAPSULE ORAL at 10:18

## 2017-11-08 RX ADMIN — RITUXIMAB 560 MG: 10 INJECTION, SOLUTION INTRAVENOUS at 11:18

## 2017-11-08 RX ADMIN — PALONOSETRON HYDROCHLORIDE 0.25 MG: 0.25 INJECTION INTRAVENOUS at 08:39

## 2017-11-08 RX ADMIN — SODIUM CHLORIDE 250 ML: 9 INJECTION, SOLUTION INTRAVENOUS at 09:49

## 2017-11-08 RX ADMIN — VINCRISTINE SULFATE 2 MG: 1 INJECTION, SOLUTION INTRAVENOUS at 10:06

## 2017-11-08 RX ADMIN — PEGFILGRASTIM 6 MG: KIT SUBCUTANEOUS at 16:35

## 2017-11-08 RX ADMIN — DOXORUBICIN HYDROCHLORIDE 75 MG: 2 INJECTION, SOLUTION INTRAVENOUS at 09:50

## 2017-11-08 RX ADMIN — CYCLOPHOSPHAMIDE 1125 MG: 1 INJECTION, POWDER, FOR SOLUTION INTRAVENOUS; ORAL at 10:16

## 2017-11-08 RX ADMIN — SODIUM CHLORIDE 150 MG: 9 INJECTION, SOLUTION INTRAVENOUS at 08:39

## 2017-11-08 RX ADMIN — ACETAMINOPHEN 650 MG: 325 TABLET ORAL at 10:18

## 2017-11-08 NOTE — PROGRESS NOTES
Infusion Nursing Note:  Amelia Michel presents today for cycle 5 day 1 Rituximab, Adriamycin, Vincristine, Cyclophosphamide, and Prednisone.    Patient seen by provider today: No   present during visit today: Not Applicable.    Note: Arrived with walker and reported some nasal drainage -- zyrtec was ordered yesterday, fatigue and weakness. Denies complaints of pain, dyspnea, dizziness, nausea, diarrhea, and constipation.    Initial /75  BP recheck 115/67 -- see doc flowsheets for details.    Intravenous Access:  Peripheral IV placed.    Treatment Conditions:  Results reviewed from 11/7, labs MET treatment parameters, ok to proceed with treatment.  ECHO/MUGA completed 9/7  EF 60-65%.      Post Infusion Assessment:  Patient tolerated infusion without incident.  Site patent and intact, free from redness, edema or discomfort.  No evidence of extravasations.  Access discontinued per protocol.    Discharge Plan:   Prescription refills given for prednisone, zyrtec, neutraphos, and gabapentin.  Discharge instructions reviewed with: Patient.  Patient and/or family verbalized understanding of discharge instructions and all questions answered.  Copy of AVS reviewed with patient and/or family.  Patient will return 11/29 for next appointment.  Patient discharged in stable condition accompanied by: .  Departure Mode: Ambulatory with walker  Neulasta Onpro On-Body injector applied to L arm at 1630 with light facing pt's .  Writer discussed Neulasta injection would start on 11/9 at 1930, approximately 27 hours after application applied today.  Written and Verbal instruction reviewed with patient.  Pt instructed when the dose delivery starts, it will take about 45 minutes to complete.  Pt aware Neulasta Onpro On-Body should have green flashing light and to call triage or on-call MD if injector flashes red or appears to be leaking. Pt aware to keep Onpro On-Body Neulasta 4 inches away from electrical  equipment and to avoid showering 4 hours prior to injection.   Neulasta Onpro Lot number: [id number]      Niranjan Kaba RN

## 2017-11-08 NOTE — PATIENT INSTRUCTIONS
Contact Numbers    [facility] [place]: [phone]  [facility] Triage:  [phone]    Call triage with chills and/or temperature greater than or equal to 100.5, uncontrolled nausea/vomiting, diarrhea, constipation, dizziness, shortness of breath, chest pain, bleeding, unexplained bruising, or any new/concerning symptoms, questions/concerns.     If you are having any concerning symptoms or wish to speak to a provider before your next infusion visit, please call your care coordinator or triage to notify them so we can adequately serve you.       After Hours: [phone]    If after hours, weekends, or holidays, call main hospital  and ask for Oncology doctor on call.

## 2017-11-08 NOTE — MR AVS SNAPSHOT
After Visit Summary   11/8/2017    Amelia Michel    MRN: [id number]           Patient Information     Date Of Birth          1960        Visit Information        Provider Department      11/8/2017 8:00 AM UC 15 ATC; [facility] ONCOLOGY INFUSION [facility]        Today's Diagnoses     Diffuse large B-cell lymphoma of extranodal site (H)    -  1      Care Instructions    Contact Numbers    [facility] Main Line: [phone]  [facility] Triage:  [phone]    Call triage with chills and/or temperature greater than or equal to 100.5, uncontrolled nausea/vomiting, diarrhea, constipation, dizziness, shortness of breath, chest pain, bleeding, unexplained bruising, or any new/concerning symptoms, questions/concerns.     If you are having any concerning symptoms or wish to speak to a provider before your next infusion visit, please call your care coordinator or triage to notify them so we can adequately serve you.       After Hours: [phone]    If after hours, weekends, or holidays, call main hospital  and ask for Oncology doctor on call.             Follow-ups after your visit        Your next 10 appointments already scheduled     Nov 29, 2017  7:15 AM CST   [facility] Draw with  [facility] DRAW   [facility] Lab Draw ([facility])    [address] [place] [address]   [phone]            Nov 29, 2017  7:50 AM CST   (Arrive by 7:35 AM)   Return Visit with Kelly Mcginnis PA-C   [facility] ([facility])    [address] [place] [address]  2nd [facility] [address]   [phone]            Nov 29, 2017  8:30 AM CST   Infusion 360 with UC ONCOLOGY INFUSION, UC 20 ATC   [facility] ([facility])    [address] [place] [address]  2nd [facility] [address]   [phone]            Dec 18, 2017  8:40 AM CST   (Arrive by 8:25 AM)   Lumbar  Puncture with CORI Cristobal [facility] ([facility])    [address]  2nd Floor  [facility] [id number] [phone]            Dec 18, 2017  9:00 AM CST   XR LUMBAR PUNCTURE INTRATHECAL CHEMO ADMIN with UUXRTENA SimmonsU NEURO RAD   [facility]  Radiology ([facility])    [address] [facility] [id number] [phone]           For nerve root injection, please send or bring copies of any MRIs or other scans you have had.  Bring a list of your current medicines to your exam. (Include vitamins, minerals and over-the-counter medicines.) Leave your valuables at home.  Plan to have someone drive you home afterward.  Stop taking the following medicines (but talk to your doctor first):   If you take blood thinners, you may need to stop taking them a few days before treatment. Talk to your doctor before stopping these medicines.Stop taking Coumadin (warfarin) 3 days before treatment. Restart the day after treatment.   If you take Plavix, Ticlid, Pletal or Persantine, please ask your doctor if you should stop these medicines. You may need extra tests on the morning of your scan.   If you take Xarelto (Rivaroxaban), you may need to stop taking it 24 hours before treatment. Talk to your doctor before stopping this medicine. Restart the day after treatment.  You may take your other medicines as normal.  Stop all food and drink (including water) 3 hours before your test or treatment.  Please tell the doctor:   If you are allergic to X-ray dye (contrast fluid).   If you may be pregnant.  Injections take about 30 to 45 minutes. Most people spend up to 2 hours in the clinic or hospital.  Please call the Imaging Department at your exam site with any questions.            Jan 08, 2018 10:30 AM CST   (Arrive by 10:15 AM)   Return Visit with CORI Arenas [facility] ([facility])    [address] [facility] [address]  4th Floor  [facility] [address] [phone]            Jan 12, 2018  8:00 AM CST   (Arrive by 7:45 AM)   Return Visit with Pj Cunha MD   [facility] Rheumatology ([facility])    [address]  3rd [facility] [address] [phone]            Jan 31, 2018 10:00 AM CST   (Arrive by 9:45 AM)   RETURN ARRHYTHMIA with Juan Eaton MD   [facility] ([facility])    [address]  3rd [facility] [id number] [phone]              Who to contact     If you have questions or need follow up information about today's clinic visit or your schedule please contact [facility] directly at [phone].  Normal or non-critical lab and imaging results will be communicated to you by MyChart, letter or phone within 4 business days after the clinic has received the results. If you do not hear from us within 7 days, please contact the clinic through [employer] or phone. If you have a critical or abnormal lab result, we will notify you by phone as soon as possible.  Submit refill requests through [employer] or call your pharmacy and they will forward the refill request to us. Please allow 3 business days for your refill to be completed.          Additional Information About Your Visit        Trial[employer] Information     [employer] gives you secure access to your electronic health record. If you see a primary care provider, you can also send messages to your care team and make appointments. If you have questions, please call your primary care clinic.  If you do not have a primary care provider, please call [phone] and they will assist you.        Care EveryWhere ID     This is your Care EveryWhere ID. This could be used by other organizations to access your [facility] medical records  [id number]        Your Vitals Were      Temperature Respirations Pulse Oximetry             97.5  F (36.4  C) (Oral) 18 100%          Blood Pressure from Last 3 Encounters:   11/08/17 115/67   11/07/17 138/68   11/07/17 122/71    Weight from Last 3 Encounters:   11/07/17 54.5 kg (120 lb 1.6 oz)   10/30/17 52.8 kg (116 lb 6.4 oz)   10/19/17 56.2 kg (123 lb 12.8 oz)              Today, you had the following     No orders found for display         Today's Medication Changes          These changes are accurate as of: 11/8/17 11:59 PM.  If you have any questions, ask your nurse or doctor.               These medicines have changed or have updated prescriptions.        Dose/Directions    * predniSONE 5 MG tablet   Commonly known as:  DELTASONE   This may have changed:  Another medication with the same name was added. Make sure you understand how and when to take each.   Used for:  Diffuse large B-cell lymphoma, unspecified body region (H)        Dose:  5 mg   Take 1 tablet (5 mg) by mouth daily   Quantity:  30 tablet   Refills:  0       * predniSONE 50 MG tablet   Commonly known as:  DELTASONE   This may have changed:  You were already taking a medication with the same name, and this prescription was added. Make sure you understand how and when to take each.   Used for:  Diffuse large B-cell lymphoma of extranodal site (H)        Dose:  50 mg   Take 1 tablet (50 mg) by mouth 2 times daily for 5 days Take on Days 1 through 5. Take first dose in AM prior to chemotherapy.   Quantity:  10 tablet   Refills:  0       * Notice:  This list has 2 medication(s) that are the same as other medications prescribed for you. Read the directions carefully, and ask your doctor or other care provider to review them with you.         Where to get your medicines      These medications were sent to [address]  [address] [place] [address] [address] [address]    Hours:  TRANSPLANT PHONE NUMBER [phone] Phone:   [phone]     predniSONE 50 MG tablet                Primary Care Provider Office Phone # Fax #    Comfort Sabillon [phone] [address] [place] [address]        Equal Access to Services     MANISHASARAH SONIDO : Hadii laurita ku hadjuanitao Soomaali, waaxda luqadaha, qaybta kaalmada adejayada, durga henriquezn jesica barriosall inman. So [facility] [phone].    ATENCIÓN: Si habla español, tiene a sarmiento disposición servicios gratuitos de asistencia lingüística. Llame al [phone].    We comply with applicable federal civil rights laws and [place] laws. We do not discriminate on the basis of race, color, national origin, age, disability, sex, sexual orientation, or gender identity.            Thank you!     Thank you for choosing [facility]  for your care. Our goal is always to provide you with excellent care. Hearing back from our patients is one way we can continue to improve our services. Please take a few minutes to complete the written survey that you may receive in the mail after your visit with us. Thank you!             Your Updated Medication List - Protect others around you: Learn how to safely use, store and throw away your medicines at www.disposemymeds.org.          This list is accurate as of: 11/8/17 11:59 PM.  Always use your most recent med list.                   Brand Name Dispense Instructions for use Diagnosis    acetaminophen 325 MG tablet    TYLENOL     Take 2 tablets (650 mg) by mouth every 4 hours as needed for mild pain, fever or headaches    Diffuse large B-cell lymphoma of extranodal site (H)       acyclovir 400 MG tablet    ZOVIRAX    60 tablet    Take 1 tablet (400 mg) by mouth 2 times daily    Diffuse large B-cell lymphoma, unspecified body region (H)       ascorbic acid 500 MG Tabs     30 tablet    Take 1 tablet (500 mg) by mouth daily    Diffuse large B-cell lymphoma, unspecified body region (H)       atenolol 25 MG tablet    TENORMIN    60  tablet    Take 1 tablet (25 mg) by mouth 2 times daily    Atrial tachycardia (H)       BENADRYL ALLERGY PO      Take 50 mg by mouth        calcium polycarbophil 625 MG tablet    FIBERCON     Take 1 tablet by mouth 2 times daily        calcium-vitamin D 600-400 MG-UNIT per tablet    CALTRATE    60 tablet    Take 2 tablets by mouth every morning    Diffuse large B-cell lymphoma, unspecified body region (H)       cetirizine 10 MG tablet    zyrTEC    30 tablet    Take 1 tablet (10 mg) by mouth daily    Hypophosphatemia       digoxin 125 MCG tablet    LANOXIN    30 tablet    Take 1 tablet (125 mcg) by mouth daily    Atrial tachycardia (H)       enoxaparin 40 MG/0.4ML injection    LOVENOX    30 Syringe    Inject 0.4 mLs (40 mg) Subcutaneous daily    VTE (venous thromboembolism), Paroxysmal atrial fibrillation (H)       fluconazole 100 MG tablet    DIFLUCAN    30 tablet    Take 1 tablet (100 mg) by mouth daily    Diffuse large B-cell lymphoma, unspecified body region (H)       gabapentin 100 MG capsule    NEURONTIN    120 capsule    Take 2 capsules (200 mg) by mouth 3 times daily    Critical illness myopathy       HYDROCORTISONE PO      Take 100 mg by mouth IV        lidocaine visc 2% 2.5mL/5mL & maalox/mylanta w/ simeth 2.5mL/5mL & diphenhydrAMINE 5mg/5mL Susp suspension    [facility]     Swish and swallow 10 mLs in mouth 2 times daily        multivitamin, therapeutic with minerals Tabs tablet     30 each    Take 1 tablet by mouth daily    Diffuse large B-cell lymphoma, unspecified body region (H)       ondansetron 8 MG ODT tab    ZOFRAN-ODT    20 tablet    Take 1 tablet (8 mg) by mouth every 8 hours as needed    Diffuse large B-cell lymphoma of extranodal site (H)       potassium & sodium phosphates 280-160-250 MG Packet    NEUTRA-PHOS    60 packet    Take 1 packet by mouth 2 times daily    Hypophosphatemia       potassium chloride SA 20 MEQ CR tablet    K-DUR/KLOR-CON M    30 tablet    Take 1 tablet (20  mEq) by mouth daily while taking furosemide (Lasix). You can decrease potassium to 20 mEq daily once off Lasix.    Diffuse large B-cell lymphoma, unspecified body region (H)       * predniSONE 5 MG tablet    DELTASONE    30 tablet    Take 1 tablet (5 mg) by mouth daily    Diffuse large B-cell lymphoma, unspecified body region (H)       * predniSONE 50 MG tablet    DELTASONE    10 tablet    Take 1 tablet (50 mg) by mouth 2 times daily for 5 days Take on Days 1 through 5. Take first dose in AM prior to chemotherapy.    Diffuse large B-cell lymphoma of extranodal site (H)       tolterodine 4 MG 24 hr capsule    DETROL LA    30 capsule    Take 1 capsule (4 mg) by mouth daily    Urinary urgency       traMADol 50 MG tablet    ULTRAM    30 tablet    Take 1 tablet (50 mg) by mouth every 6 hours as needed for moderate pain    Diffuse large B-cell lymphoma of extranodal site (H)       * Notice:  This list has 2 medication(s) that are the same as other medications prescribed for you. Read the directions carefully, and ask your doctor or other care provider to review them with you.

## 2017-11-09 ENCOUNTER — DOCUMENTATION ONLY (OUTPATIENT)
Dept: CARE COORDINATION | Facility: CLINIC | Age: 57
End: 2017-11-09

## 2017-11-09 LAB
APPEARANCE CSF: CLEAR
COLOR CSF: COLORLESS
RBC # CSF MANUAL: 0 /UL (ref 0–2)
TUBE # CSF: 3 #
WBC # CSF MANUAL: 0 /UL (ref 0–5)

## 2017-11-10 NOTE — PROGRESS NOTES
[facility] now requests orders and shares plan of care/discharge summaries for some patients through [employer] [employer].  Please REPLY TO THIS MESSAGE in order to give authorization for orders when needed.  This is considered a verbal order, you will still receive a faxed copy of orders for signature.  Thank you for your assistance in improving collaboration for our patients.    ORDER SN 1 x week x 9, 3 prn.  MD SUMMARY/PLAN OF CARE   Recert done 11/9/17 for patient with Diffuse large B Cell lymphoma of extra prieto site. Health history as follows Rheumatoid Arthritis, cardiogenic shock, primary pulmonary hypertension, wound of left upper extremity, and hypertension.  Skilled nursing continues for slow healing wound to Left upper arm from adverse reaction from CT dye / extravasation. Wound measures 1.75 cm x 0.75 cm x 0.3 cm.   Patient underwent chemo on 11/8/17  cycle 5 day 1 Rituximab, Adriamycin, Vincristine, Cyclophosphamide, and Prednisone.  Facial redness from Prednisone.   patient tolersted a LP on 11/7/17 .   Bree uses a walker when ambulating , she denies any falls or injuries.   Sn visits will continue weekly for assessment of wound healing and cares, Palliative cares, med mgt and comfort measures.  Expected length of care ongoing r/t Complex health history.  Thank You

## 2017-11-11 ENCOUNTER — MEDICAL CORRESPONDENCE (OUTPATIENT)
Dept: HEALTH INFORMATION MANAGEMENT | Facility: CLINIC | Age: 57
End: 2017-11-11

## 2017-11-12 LAB
BACTERIA SPEC CULT: NO GROWTH
Lab: NORMAL
SPECIMEN SOURCE: NORMAL

## 2017-11-14 ENCOUNTER — DOCUMENTATION ONLY (OUTPATIENT)
Dept: ONCOLOGY | Facility: CLINIC | Age: 57
End: 2017-11-14

## 2017-11-14 NOTE — PROGRESS NOTES
Additional information sent to [facility] per request for [place] family form.    Faxed to [facility] at [phone] on 11/14/17

## 2017-11-15 DIAGNOSIS — E87.6 LOW SERUM POTASSIUM: Primary | ICD-10-CM

## 2017-11-15 DIAGNOSIS — I82.90 VTE (VENOUS THROMBOEMBOLISM): ICD-10-CM

## 2017-11-15 DIAGNOSIS — I47.19 ATRIAL TACHYCARDIA (H): ICD-10-CM

## 2017-11-15 DIAGNOSIS — I48.0 PAROXYSMAL ATRIAL FIBRILLATION (H): ICD-10-CM

## 2017-11-15 RX ORDER — DIGOXIN 125 MCG
125 TABLET ORAL DAILY
Qty: 30 TABLET | Refills: 3 | Status: SHIPPED | OUTPATIENT
Start: 2017-11-15 | End: 2018-01-24

## 2017-11-15 RX ORDER — ACETAMINOPHEN 325 MG/1
325 TABLET ORAL ONCE
Status: CANCELLED
Start: 2017-11-15 | End: 2017-11-15

## 2017-11-15 RX ORDER — POTASSIUM CHLORIDE 750 MG/1
10 TABLET, EXTENDED RELEASE ORAL DAILY
Qty: 30 TABLET | Refills: 1 | Status: SHIPPED | OUTPATIENT
Start: 2017-11-15 | End: 2017-11-29

## 2017-11-15 RX ORDER — DIPHENHYDRAMINE HCL 25 MG
25 CAPSULE ORAL
Status: CANCELLED
Start: 2017-11-15

## 2017-11-16 ENCOUNTER — INFUSION THERAPY VISIT (OUTPATIENT)
Dept: ONCOLOGY | Facility: CLINIC | Age: 57
End: 2017-11-16
Attending: PHYSICIAN ASSISTANT
Payer: COMMERCIAL

## 2017-11-16 ENCOUNTER — APPOINTMENT (OUTPATIENT)
Dept: LAB | Facility: CLINIC | Age: 57
End: 2017-11-16
Attending: PHYSICIAN ASSISTANT
Payer: COMMERCIAL

## 2017-11-16 VITALS
BODY MASS INDEX: 25 KG/M2 | HEART RATE: 68 BPM | OXYGEN SATURATION: 96 % | DIASTOLIC BLOOD PRESSURE: 84 MMHG | WEIGHT: 119.6 LBS | SYSTOLIC BLOOD PRESSURE: 150 MMHG | RESPIRATION RATE: 18 BRPM | TEMPERATURE: 97.9 F

## 2017-11-16 DIAGNOSIS — C83.398 DIFFUSE LARGE B-CELL LYMPHOMA OF EXTRANODAL SITE: Primary | ICD-10-CM

## 2017-11-16 DIAGNOSIS — T45.1X5A CHEMOTHERAPY-INDUCED NEUTROPENIA (H): Primary | ICD-10-CM

## 2017-11-16 DIAGNOSIS — D70.1 CHEMOTHERAPY-INDUCED NEUTROPENIA (H): Primary | ICD-10-CM

## 2017-11-16 LAB
ABO + RH BLD: NORMAL
ABO + RH BLD: NORMAL
BASOPHILS # BLD AUTO: 0 10E9/L (ref 0–0.2)
BASOPHILS # BLD AUTO: 0 10E9/L (ref 0–0.2)
BASOPHILS NFR BLD AUTO: 1.7 %
BASOPHILS NFR BLD AUTO: 1.8 %
BLD GP AB SCN SERPL QL: NORMAL
BLD PROD TYP BPU: NORMAL
BLD UNIT ID BPU: 0
BLOOD BANK CMNT PATIENT-IMP: NORMAL
BLOOD PRODUCT CODE: NORMAL
BPU ID: NORMAL
DIFFERENTIAL METHOD BLD: ABNORMAL
DIFFERENTIAL METHOD BLD: ABNORMAL
EOSINOPHIL # BLD AUTO: 0 10E9/L (ref 0–0.7)
EOSINOPHIL # BLD AUTO: 0 10E9/L (ref 0–0.7)
EOSINOPHIL NFR BLD AUTO: 0 %
EOSINOPHIL NFR BLD AUTO: 0 %
ERYTHROCYTE [DISTWIDTH] IN BLOOD BY AUTOMATED COUNT: 12.5 % (ref 10–15)
ERYTHROCYTE [DISTWIDTH] IN BLOOD BY AUTOMATED COUNT: 12.5 % (ref 10–15)
HCT VFR BLD AUTO: 33.7 % (ref 35–47)
HCT VFR BLD AUTO: 35.5 % (ref 35–47)
HGB BLD-MCNC: 11.7 G/DL (ref 11.7–15.7)
HGB BLD-MCNC: 12.4 G/DL (ref 11.7–15.7)
LYMPHOCYTES # BLD AUTO: 0.6 10E9/L (ref 0.8–5.3)
LYMPHOCYTES # BLD AUTO: 0.7 10E9/L (ref 0.8–5.3)
LYMPHOCYTES NFR BLD AUTO: 42.7 %
LYMPHOCYTES NFR BLD AUTO: 49.1 %
MACROCYTES BLD QL SMEAR: PRESENT
MACROCYTES BLD QL SMEAR: PRESENT
MCH RBC QN AUTO: 38.6 PG (ref 26.5–33)
MCH RBC QN AUTO: 38.9 PG (ref 26.5–33)
MCHC RBC AUTO-ENTMCNC: 34.7 G/DL (ref 31.5–36.5)
MCHC RBC AUTO-ENTMCNC: 34.9 G/DL (ref 31.5–36.5)
MCV RBC AUTO: 111 FL (ref 78–100)
MCV RBC AUTO: 111 FL (ref 78–100)
METAMYELOCYTES # BLD: 0 10E9/L
METAMYELOCYTES # BLD: 0 10E9/L
METAMYELOCYTES NFR BLD MANUAL: 1.8 %
METAMYELOCYTES NFR BLD MANUAL: 3.5 %
MONOCYTES # BLD AUTO: 0.2 10E9/L (ref 0–1.3)
MONOCYTES # BLD AUTO: 0.3 10E9/L (ref 0–1.3)
MONOCYTES NFR BLD AUTO: 16.1 %
MONOCYTES NFR BLD AUTO: 24.3 %
MYELOCYTES # BLD: 0 10E9/L
MYELOCYTES # BLD: 0.1 10E9/L
MYELOCYTES NFR BLD MANUAL: 3.5 %
MYELOCYTES NFR BLD MANUAL: 4.4 %
NEUTROPHILS # BLD AUTO: 0.3 10E9/L (ref 1.6–8.3)
NEUTROPHILS # BLD AUTO: 0.4 10E9/L (ref 1.6–8.3)
NEUTROPHILS NFR BLD AUTO: 24.3 %
NEUTROPHILS NFR BLD AUTO: 26.8 %
NUM BPU REQUESTED: 1
PLATELET # BLD AUTO: 6 10E9/L (ref 150–450)
PLATELET # BLD AUTO: 9 10E9/L (ref 150–450)
PLATELET # BLD EST: ABNORMAL 10*3/UL
PLATELET # BLD EST: ABNORMAL 10*3/UL
RBC # BLD AUTO: 3.03 10E12/L (ref 3.8–5.2)
RBC # BLD AUTO: 3.19 10E12/L (ref 3.8–5.2)
SPECIMEN EXP DATE BLD: NORMAL
TRANSFUSION STATUS PATIENT QL: NORMAL
WBC # BLD AUTO: 1.3 10E9/L (ref 4–11)
WBC # BLD AUTO: 1.4 10E9/L (ref 4–11)

## 2017-11-16 PROCEDURE — 99212 OFFICE O/P EST SF 10 MIN: CPT | Mod: 25

## 2017-11-16 PROCEDURE — 40000141 ZZH STATISTIC PERIPHERAL IV START W/O US GUIDANCE: Mod: ZF

## 2017-11-16 PROCEDURE — P9037 PLATE PHERES LEUKOREDU IRRAD: HCPCS | Performed by: INTERNAL MEDICINE

## 2017-11-16 PROCEDURE — 36430 TRANSFUSION BLD/BLD COMPNT: CPT

## 2017-11-16 PROCEDURE — 85025 COMPLETE CBC W/AUTO DIFF WBC: CPT | Performed by: INTERNAL MEDICINE

## 2017-11-16 PROCEDURE — 25000132 ZZH RX MED GY IP 250 OP 250 PS 637: Mod: ZF | Performed by: INTERNAL MEDICINE

## 2017-11-16 PROCEDURE — 25000128 H RX IP 250 OP 636: Mod: ZF | Performed by: INTERNAL MEDICINE

## 2017-11-16 PROCEDURE — 86900 BLOOD TYPING SEROLOGIC ABO: CPT | Performed by: PHYSICIAN ASSISTANT

## 2017-11-16 PROCEDURE — 96374 THER/PROPH/DIAG INJ IV PUSH: CPT

## 2017-11-16 PROCEDURE — 86923 COMPATIBILITY TEST ELECTRIC: CPT | Performed by: PHYSICIAN ASSISTANT

## 2017-11-16 PROCEDURE — 36415 COLL VENOUS BLD VENIPUNCTURE: CPT

## 2017-11-16 PROCEDURE — 85025 COMPLETE CBC W/AUTO DIFF WBC: CPT | Performed by: PHYSICIAN ASSISTANT

## 2017-11-16 PROCEDURE — 86850 RBC ANTIBODY SCREEN: CPT | Performed by: PHYSICIAN ASSISTANT

## 2017-11-16 PROCEDURE — 86901 BLOOD TYPING SEROLOGIC RH(D): CPT | Performed by: PHYSICIAN ASSISTANT

## 2017-11-16 RX ORDER — DIPHENHYDRAMINE HCL 25 MG
25 CAPSULE ORAL
Status: COMPLETED | OUTPATIENT
Start: 2017-11-16 | End: 2017-11-16

## 2017-11-16 RX ORDER — LEVOFLOXACIN 250 MG/1
250 TABLET, FILM COATED ORAL DAILY
Qty: 30 TABLET | Refills: 1 | Status: SHIPPED | OUTPATIENT
Start: 2017-11-16 | End: 2017-12-12

## 2017-11-16 RX ORDER — ACETAMINOPHEN 325 MG/1
325 TABLET ORAL ONCE
Status: COMPLETED | OUTPATIENT
Start: 2017-11-16 | End: 2017-11-16

## 2017-11-16 RX ADMIN — DIPHENHYDRAMINE HYDROCHLORIDE 25 MG: 25 CAPSULE ORAL at 16:23

## 2017-11-16 RX ADMIN — ACETAMINOPHEN 325 MG: 325 TABLET ORAL at 16:23

## 2017-11-16 RX ADMIN — HYDROCORTISONE SODIUM SUCCINATE 100 MG: 100 INJECTION, POWDER, FOR SOLUTION INTRAMUSCULAR; INTRAVENOUS at 16:24

## 2017-11-16 ASSESSMENT — PAIN SCALES - GENERAL: PAINLEVEL: NO PAIN (0)

## 2017-11-16 NOTE — PATIENT INSTRUCTIONS
Contact Numbers    [facility] Main Line: [phone]  [facility] Triage:  [phone]    Call triage with chills and/or temperature greater than or equal to 100.5, uncontrolled nausea/vomiting, diarrhea, constipation, dizziness, shortness of breath, chest pain, bleeding, unexplained bruising, or any new/concerning symptoms, questions/concerns.     If you are having any concerning symptoms or wish to speak to a provider before your next infusion visit, please call your care coordinator or triage to notify them so we can adequately serve you.       After Hours: [phone]    If after hours, weekends, or holidays, call main hospital  and ask for Oncology doctor on call.           November 2017 Sunday Monday Tuesday Wednesday Thursday Friday Saturday                  1     2     3     4       5     6     7     [facility] DRAW    6:30 AM   (15 min.)    [facility]   [facility]     [facility] RETURN    6:45 AM   (50 min.)   Kelly Mcginnis PA-C M [facility]     Admission    8:24 AM   Victor Manuel Blair MD   Unit [address] [facility]   (Discharge: 11/7/2017)     PROCEDURE - 3 HR    9:30 AM   (180 min.)   U2A ROOM 10   Unit 2A [facility]     XR LP INTRATHECAL CHEMO ADMIN   11:00 AM   (60 min.)   [id number]   [facility] [place],  Radiology     UM LUMBAR PUNCTURE   11:25 AM   (100 min.)   Kalpana Alvarado PA-C M [facility] 8     P ONC INFUSION 360    8:00 AM   (360 min.)    ONCOLOGY INFUSION   [facility] 9     10     11       12     13     14     15     16     [facility] DRAW    3:00 PM   (15 min.)    [facility]   [facility]     [facility] ONC INFUSION 120    3:30 PM   (120 min.)    ONCOLOGY INFUSION   [facility] 17     18       19     20     [facility] DRAW   11:30 AM   (15 min.)    [facility]   [facility]     [facility] ONC INFUSION 120   12:00 PM   (120 min.)    ONCOLOGY  INFUSION   [facility] 21     22     23     24     25  Happy Birthday!       26     27     28     29     [email or web address] DRAW    7:15 AM   (15 min.)    [facility] DRAW   [facility] Lab Draw     [facility] [[email or web address]] RETURN    7:35 AM   (50 min.)   Kelly Mcginnis PA-C M [facility] ONC INFUSION 360    8:30 AM   (360 min.)   [facility] ONCOLOGY INFUSION   [facility] 30 December 2017 Sunday Monday Tuesday Wednesday Thursday Friday Saturday                            1     2       3     4     5     6     7     8     9       10     11     12     13     14     15     16       17     18     [facility] [[email or web address]] LUMBAR PUNCTURE    8:25 AM   (100 min.)   Kelly Mcginnis PA-C M [facility]     XR LP INTRATHECAL CHEMO ADMIN    9:00 AM   (60 min.)   [id number]   [facility] [place],  Radiology 19     20     21     22     23       24     25     26     27     28     29     30       31                                               Recent Results (from the past 24 hour(s))   Blood component    Collection Time: 11/16/17  1:00 AM   Result Value Ref Range    Unit Number [id number]     Blood Component Type PlateletPheresis,LeukoRed Irrad (Part 2)     Division Number 00     Status of Unit Released to care unit 11/16/2017 1643     Blood Product Code [id number]     Unit Status ISS    ABO/Rh type and screen    Collection Time: 11/16/17  3:14 PM   Result Value Ref Range    Units Ordered 1     ABO O     RH(D) Neg     Antibody Screen Neg     Test Valid Only At          [facility]    Specimen Expires 11/19/2017     Crossmatch Red Blood Cells    CBC with platelets differential    Collection Time: 11/16/17  3:14 PM   Result Value Ref Range    WBC 1.4 (L) 4.0 - 11.0 10e9/L    RBC Count 3.19 (L) 3.8 - 5.2 10e12/L    Hemoglobin 12.4 11.7 - 15.7 g/dL    Hematocrit 35.5 35.0 - 47.0 %     (H) 78 - 100 fl    MCH 38.9 (H)  26.5 - 33.0 pg    MCHC 34.9 31.5 - 36.5 g/dL    RDW 12.5 10.0 - 15.0 %    Platelet Count 9 (LL) 150 - 450 10e9/L    Diff Method Manual Differential     % Neutrophils 26.8 %    % Lymphocytes 49.1 %    % Monocytes 16.1 %    % Eosinophils 0.0 %    % Basophils 1.8 %    % Metamyelocytes 1.8 %    % Myelocytes 4.4 %    Absolute Neutrophil 0.4 (LL) 1.6 - 8.3 10e9/L    Absolute Lymphocytes 0.7 (L) 0.8 - 5.3 10e9/L    Absolute Monocytes 0.2 0.0 - 1.3 10e9/L    Absolute Eosinophils 0.0 0.0 - 0.7 10e9/L    Absolute Basophils 0.0 0.0 - 0.2 10e9/L    Absolute Metamyelocytes 0.0 0 10e9/L    Absolute Myelocytes 0.1 (H) 0 10e9/L    Macrocytes Present     Platelet Estimate Confirming automated cell count    Blood component    Collection Time: 11/16/17  3:14 PM   Result Value Ref Range    Unit Number [id number]     Blood Component Type Red Blood Cells Leukocyte Reduced     Division Number 00     Status of Unit Ready for patient 11/16/2017 1706     Blood Product Code [id number]     Unit Status ALEJANDRA    CBC with platelets differential    Collection Time: 11/16/17  3:55 PM   Result Value Ref Range    WBC 1.3 (L) 4.0 - 11.0 10e9/L    RBC Count 3.03 (L) 3.8 - 5.2 10e12/L    Hemoglobin 11.7 11.7 - 15.7 g/dL    Hematocrit 33.7 (L) 35.0 - 47.0 %     (H) 78 - 100 fl    MCH 38.6 (H) 26.5 - 33.0 pg    MCHC 34.7 31.5 - 36.5 g/dL    RDW 12.5 10.0 - 15.0 %    Platelet Count 6 (LL) 150 - 450 10e9/L    Diff Method Manual Differential     % Neutrophils 24.3 %    % Lymphocytes 42.7 %    % Monocytes 24.3 %    % Eosinophils 0.0 %    % Basophils 1.7 %    % Metamyelocytes 3.5 %    % Myelocytes 3.5 %    Absolute Neutrophil 0.3 (LL) 1.6 - 8.3 10e9/L    Absolute Lymphocytes 0.6 (L) 0.8 - 5.3 10e9/L    Absolute Monocytes 0.3 0.0 - 1.3 10e9/L    Absolute Eosinophils 0.0 0.0 - 0.7 10e9/L    Absolute Basophils 0.0 0.0 - 0.2 10e9/L    Absolute Metamyelocytes 0.0 0 10e9/L    Absolute Myelocytes 0.0 0 10e9/L    Macrocytes Present     Platelet Estimate  Confirming automated cell count    Blood component    Collection Time: 11/16/17  4:34 PM   Result Value Ref Range    Unit Number [id number]     Blood Component Type PlateletPheresis,LeukoRed Irrad (Part 2)     Division Number 00     Status of Unit Released to care unit 11/16/2017 1754     Blood Product Code [id number]     Unit Status ISS

## 2017-11-16 NOTE — MR AVS SNAPSHOT
After Visit Summary   11/16/2017    Amelia Michel    MRN: [id number]           Patient Information     Date Of Birth          1960        Visit Information        Provider Department      11/16/2017 3:30 PM UC 16 ATC; UC ONCOLOGY INFUSION [facility]        Today's Diagnoses     Diffuse large B-cell lymphoma of extranodal site (H)    -  1      Care Instructions    Contact Numbers    [facility] Main Line: [phone]  [facility] Triage:  [phone]    Call triage with chills and/or temperature greater than or equal to 100.5, uncontrolled nausea/vomiting, diarrhea, constipation, dizziness, shortness of breath, chest pain, bleeding, unexplained bruising, or any new/concerning symptoms, questions/concerns.     If you are having any concerning symptoms or wish to speak to a provider before your next infusion visit, please call your care coordinator or triage to notify them so we can adequately serve you.       After Hours: [phone]    If after hours, weekends, or holidays, call main hospital  and ask for Oncology doctor on call.           November 2017 Sunday Monday Tuesday Wednesday Thursday Friday Saturday                  1     2     3     4       5     6     7     [facility]    6:30 AM   (15 min.)   [facility] LAB DRAW   [facility] Lab Draw     [facility] RETURN    6:45 AM   (50 min.)   Kelly Mcginnis PA-C [facility]     Admission    8:24 AM   Victor Manuel Blair MD   Unit 2A [facility]   (Discharge: 11/7/2017)     PROCEDURE - 3 HR    9:30 AM   (180 min.)   U2A ROOM 10   Unit 2A [facility]     XR LP INTRATHECAL CHEMO ADMIN   11:00 AM   (60 min.)   [id number]   [facility] [place],  Radiology     [facility] LUMBAR PUNCTURE   11:25 AM   (100 min.)   Kalpana Alvarado PA-C [facility] 8     [facility] ONC INFUSION 360    8:00 AM   (360 min.)   UC ONCOLOGY INFUSION   [facility] 9     10     11       12      13     14     15     16     [facility] LAB DRAW    3:00 PM   (15 min.)    [facility] LAB DRAW   [facility] Lab Draw     [facility] ONC INFUSION 120    3:30 PM   (120 min.)    ONCOLOGY INFUSION   [facility] 17     18       19     20     [facility] LAB DRAW   11:30 AM   (15 min.)    [facility] LAB DRAW   [facility] Lab Draw     [facility] ONC INFUSION 120   12:00 PM   (120 min.)    ONCOLOGY INFUSION   [facility] 21     22     23     24     25  Happy Birthday!       26     27     28     29     P [facility] LAB DRAW    7:15 AM   (15 min.)    [facility] LAB DRAW   [facility] Lab Draw     [facility] RETURN    7:35 AM   (50 min.)   Kelly [facility]inn[facility]    8:30 AM   (360 min.)    ONCOLOGY INFUSION   [facility] 30 December 2017 Sunday Monday Tuesday Wednesday Thursday Friday Saturday                            1     2       3     4     5     6     7     8     9       10     11     12     13     14     15     16       17     18     [facility] LUMBAR PUNCTURE    8:25 AM   (100 min.)   Kelly Mcginnis PA-C   [facility]     XR LP INTRATHECAL CHEMO ADMIN    9:00 AM   (60 min.)   UUXR4   [facility] [place],  Radiology 19     20     21     22     23       24     25     26     27     28     29     30       31                                               Recent Results (from the past 24 hour(s))   Blood component    Collection Time: 11/16/17  1:00 AM   Result Value Ref Range    Unit Number [id number]     Blood Component Type PlateletPheresis,LeukoRed Irrad (Part 2)     Division Number 00     Status of Unit Released to care unit 11/16/2017 1643     Blood Product Code [id number]     Unit Status ISS    ABO/Rh type and screen    Collection Time: 11/16/17  3:14 PM   Result Value Ref Range    Units Ordered 1     ABO O     RH(D) Neg     Antibody Screen Neg     Test Valid Only  At          [facility]    Specimen Expires 11/19/2017     Crossmatch Red Blood Cells    CBC with platelets differential    Collection Time: 11/16/17  3:14 PM   Result Value Ref Range    WBC 1.4 (L) 4.0 - 11.0 10e9/L    RBC Count 3.19 (L) 3.8 - 5.2 10e12/L    Hemoglobin 12.4 11.7 - 15.7 g/dL    Hematocrit 35.5 35.0 - 47.0 %     (H) 78 - 100 fl    MCH 38.9 (H) 26.5 - 33.0 pg    MCHC 34.9 31.5 - 36.5 g/dL    RDW 12.5 10.0 - 15.0 %    Platelet Count 9 (LL) 150 - 450 10e9/L    Diff Method Manual Differential     % Neutrophils 26.8 %    % Lymphocytes 49.1 %    % Monocytes 16.1 %    % Eosinophils 0.0 %    % Basophils 1.8 %    % Metamyelocytes 1.8 %    % Myelocytes 4.4 %    Absolute Neutrophil 0.4 (LL) 1.6 - 8.3 10e9/L    Absolute Lymphocytes 0.7 (L) 0.8 - 5.3 10e9/L    Absolute Monocytes 0.2 0.0 - 1.3 10e9/L    Absolute Eosinophils 0.0 0.0 - 0.7 10e9/L    Absolute Basophils 0.0 0.0 - 0.2 10e9/L    Absolute Metamyelocytes 0.0 0 10e9/L    Absolute Myelocytes 0.1 (H) 0 10e9/L    Macrocytes Present     Platelet Estimate Confirming automated cell count    Blood component    Collection Time: 11/16/17  3:14 PM   Result Value Ref Range    Unit Number [id number]     Blood Component Type Red Blood Cells Leukocyte Reduced     Division Number 00     Status of Unit Ready for patient 11/16/2017 1706     Blood Product Code [id number]     Unit Status ALEJANDRA    CBC with platelets differential    Collection Time: 11/16/17  3:55 PM   Result Value Ref Range    WBC 1.3 (L) 4.0 - 11.0 10e9/L    RBC Count 3.03 (L) 3.8 - 5.2 10e12/L    Hemoglobin 11.7 11.7 - 15.7 g/dL    Hematocrit 33.7 (L) 35.0 - 47.0 %     (H) 78 - 100 fl    MCH 38.6 (H) 26.5 - 33.0 pg    MCHC 34.7 31.5 - 36.5 g/dL    RDW 12.5 10.0 - 15.0 %    Platelet Count 6 (LL) 150 - 450 10e9/L    Diff Method Manual Differential     % Neutrophils 24.3 %    % Lymphocytes 42.7 %    % Monocytes 24.3 %    % Eosinophils 0.0 %    %  Basophils 1.7 %    % Metamyelocytes 3.5 %    % Myelocytes 3.5 %    Absolute Neutrophil 0.3 (LL) 1.6 - 8.3 10e9/L    Absolute Lymphocytes 0.6 (L) 0.8 - 5.3 10e9/L    Absolute Monocytes 0.3 0.0 - 1.3 10e9/L    Absolute Eosinophils 0.0 0.0 - 0.7 10e9/L    Absolute Basophils 0.0 0.0 - 0.2 10e9/L    Absolute Metamyelocytes 0.0 0 10e9/L    Absolute Myelocytes 0.0 0 10e9/L    Macrocytes Present     Platelet Estimate Confirming automated cell count    Blood component    Collection Time: 11/16/17  4:34 PM   Result Value Ref Range    Unit Number [id number]     Blood Component Type PlateletPheresis,LeukoRed Irrad (Part 2)     Division Number 00     Status of Unit Released to care unit 11/16/2017 1754     Blood Product Code [id number]     Unit Status ISS                    Follow-ups after your visit        Your next 10 appointments already scheduled     Nov 20, 2017 11:30 AM CST   [facility] Lab Draw with  [facility] LAB DRAW   [facility] Lab Draw ([facility])    [address] [place] [address]   [phone]            Nov 20, 2017 12:00 PM CST   Infusion 120 with UC ONCOLOGY INFUSION, UC 15 ATC   [facility] ([facility])    [address] [place] [address]   [phone]            Nov 29, 2017  7:15 AM CST   [facility] Lab Draw with  [facility] LAB DRAW   [facility]    [address] [place] [address]   [phone]            Nov 29, 2017  7:50 AM CST   (Arrive by 7:35 AM)   Return Visit with Kelly Mcginnis PA-C   [facility] ([facility])    [address] [place] [address]   [phone]            Nov 29, 2017  8:30 AM CST   Infusion 360 with UC ONCOLOGY INFUSION, UC 20 ATC   [facility] ([facility])    [facility]  [address] [address]   [phone]            Dec 18, 2017  8:40 AM CST   (Arrive by 8:25 AM)   Lumbar Puncture with CORI [facility] ([facility])    [address] [address]   [phone]            Dec 18, 2017  9:00 AM CST   XR LUMBAR PUNCTURE INTRATHECAL CHEMO ADMIN with UUXR4, UU NEURO RAD   [facility] [place],  Radiology ([facility])    [address] [facility] [address]   [phone]           For nerve root injection, please send or bring copies of any MRIs or other scans you have had.  Bring a list of your current medicines to your exam. (Include vitamins, minerals and over-the-counter medicines.) Leave your valuables at home.  Plan to have someone drive you home afterward.  Stop taking the following medicines (but talk to your doctor first):   If you take blood thinners, you may need to stop taking them a few days before treatment. Talk to your doctor before stopping these medicines.Stop taking Coumadin (warfarin) 3 days before treatment. Restart the day after treatment.   If you take Plavix, Ticlid, Pletal or Persantine, please ask your doctor if you should stop these medicines. You may need extra tests on the morning of your scan.   If you take Xarelto (Rivaroxaban), you may need to stop taking it 24 hours before treatment. Talk to your doctor before stopping this medicine. Restart the day after treatment.  You may take your other medicines as normal.  Stop all food and drink (including water) 3 hours before your test or treatment.  Please tell the doctor:   If you are allergic to X-ray dye (contrast fluid).   If you may be pregnant.  Injections take about 30 to 45 minutes. Most people spend up to 2 hours in the clinic or hospital.  Please call the Imaging Department at your exam site with any questions.               Future tests that were ordered for you today     Open Standing Orders        Priority Remaining Interval Expires Ordered    Transfuse platelets unit Routine 98/100 TRANSFUSE 1 DOSE  11/16/2017    Red blood cell prepare order unit Routine 99/100 CONDITIONAL (SPECIFY) BLOOD  11/15/2017    Platelets prepare order unit Routine 98/100 CONDITIONAL (SPECIFY) BLOOD  11/15/2017            Who to contact     If you have questions or need follow up information about today's clinic visit or your schedule please contact [facility] directly at [phone].  Normal or non-critical lab and imaging results will be communicated to you by e[employer], letter or phone within 4 business days after the clinic has received the results. If you do not hear from us within 7 days, please contact the clinic through [employer] or phone. If you have a critical or abnormal lab result, we will notify you by phone as soon as possible.  Submit refill requests through [employer] or call your pharmacy and they will forward the refill request to us. Please allow 3 business days for your refill to be completed.          Additional Information About Your Visit        eKon[employer]th[employer] Information     [employer] gives you secure access to your electronic health record. If you see a primary care provider, you can also send messages to your care team and make appointments. If you have questions, please call your primary care clinic.  If you do not have a primary care provider, please call [phone] and they will assist you.        Care EveryWhere ID     This is your Care EveryWhere ID. This could be used by other organizations to access your [facility] medical records  [id number]        Your Vitals Were     Pulse Temperature Respirations Pulse Oximetry BMI (Body Mass Index)       69 98.1  F (36.7  C) (Tympanic) 18 98% 25 kg/m2        Blood Pressure from Last 3 Encounters:   11/16/17 141/73   11/08/17 115/67   11/07/17 138/68    Weight from Last 3  Encounters:   11/16/17 54.3 kg (119 lb 9.6 oz)   11/07/17 54.5 kg (120 lb 1.6 oz)   10/30/17 52.8 kg (116 lb 6.4 oz)              We Performed the Following     ABO/Rh type and screen     Blood component     Blood component     Blood component     CBC with platelets differential     CBC with platelets differential     Platelets prepare order unit     Platelets prepare order unit     Transfuse platelets unit     Transfuse platelets unit          Today's Medication Changes          These changes are accurate as of: 11/16/17  6:17 PM.  If you have any questions, ask your nurse or doctor.               Start taking these medicines.        Dose/Directions    levofloxacin 250 MG tablet   Commonly known as:  LEVAQUIN   Used for:  Chemotherapy-induced neutropenia (H)   Started by:  Lenore Rodriguez MD        Dose:  250 mg   Take 1 tablet (250 mg) by mouth daily   Quantity:  30 tablet   Refills:  1            Where to get your medicines      These medications were sent to [address] [place] [address] [address] [place] [address]    Hours:  TRANSPLANT PHONE NUMBER [phone] Phone:  [phone]     levofloxacin 250 MG tablet                Primary Care Provider Office Phone # Fax #    Comfort Sabillon [phone] [address] [facility] [address]        Equal Access to Services     CATINA HEAD AH: Hadii laurita mcguire hadasho Sogilbert, waaxda luqadaha, qaybta kaalmada adeegyada, durga price haynegin inman. So [facility] [phone].    ATENCIÓN: Si habla español, tiene a sarmiento disposición servicios gratuitos de asistencia lingüística. Llame al [phone].    We comply with applicable federal civil rights laws and [place] laws. We do not discriminate on the basis of race, color, national origin, age, disability, sex, sexual orientation, or gender identity.            Thank you!     Thank you for choosing [facility]  for your care. Our goal is always to provide you with excellent care. Hearing back from our patients is one way we can continue to improve our services. Please take a few minutes to complete the written survey that you may receive in the mail after your visit with us. Thank you!             Your Updated Medication List - Protect others around you: Learn how to safely use, store and throw away your medicines at www.disposemymeds.org.          This list is accurate as of: 11/16/17  6:17 PM.  Always use your most recent med list.                   Brand Name Dispense Instructions for use Diagnosis    acetaminophen 325 MG tablet    TYLENOL     Take 2 tablets (650 mg) by mouth every 4 hours as needed for mild pain, fever or headaches    Diffuse large B-cell lymphoma of extranodal site (H)       acyclovir 400 MG tablet    ZOVIRAX    60 tablet    Take 1 tablet (400 mg) by mouth 2 times daily    Diffuse large B-cell lymphoma, unspecified body region (H)       ascorbic acid 500 MG Tabs     30 tablet    Take 1 tablet (500 mg) by mouth daily    Diffuse large B-cell lymphoma, unspecified body region (H)       atenolol 25 MG tablet    TENORMIN    60 tablet    Take 1 tablet (25 mg) by mouth 2 times daily    Atrial tachycardia (H)       BENADRYL ALLERGY PO      Take 50 mg by mouth        calcium polycarbophil 625 MG tablet    FIBERCON     Take 1 tablet by mouth 2 times daily        calcium-vitamin D 600-400 MG-UNIT per tablet    CALTRATE    60 tablet    Take 2 tablets by mouth every morning    Diffuse large B-cell lymphoma, unspecified body region (H)       cetirizine 10 MG tablet    zyrTEC    30 tablet    Take 1 tablet (10 mg) by mouth daily    Hypophosphatemia       digoxin 125 MCG tablet    LANOXIN    30 tablet    Take 1 tablet (125 mcg) by mouth daily    Atrial tachycardia (H)       enoxaparin 40 MG/0.4ML injection    LOVENOX    30 Syringe    Inject 0.4 mLs (40 mg) Subcutaneous daily    VTE (venous  thromboembolism), Paroxysmal atrial fibrillation (H)       fluconazole 100 MG tablet    DIFLUCAN    30 tablet    Take 1 tablet (100 mg) by mouth daily    Diffuse large B-cell lymphoma, unspecified body region (H)       gabapentin 100 MG capsule    NEURONTIN    120 capsule    Take 2 capsules (200 mg) by mouth 3 times daily    Critical illness myopathy       HYDROCORTISONE PO      Take 100 mg by mouth IV        levofloxacin 250 MG tablet    LEVAQUIN    30 tablet    Take 1 tablet (250 mg) by mouth daily    Chemotherapy-induced neutropenia (H)       lidocaine visc 2% 2.5mL/5mL & maalox/mylanta w/ simeth 2.5mL/5mL & diphenhydrAMINE 5mg/5mL Susp suspension    [facility]     Swish and swallow 10 mLs in mouth 2 times daily        multivitamin, therapeutic with minerals Tabs tablet     30 each    Take 1 tablet by mouth daily    Diffuse large B-cell lymphoma, unspecified body region (H)       ondansetron 8 MG ODT tab    ZOFRAN-ODT    20 tablet    Take 1 tablet (8 mg) by mouth every 8 hours as needed    Diffuse large B-cell lymphoma of extranodal site (H)       potassium & sodium phosphates 280-160-250 MG Packet    NEUTRA-PHOS    60 packet    Take 1 packet by mouth 2 times daily    Hypophosphatemia       potassium chloride SA 10 MEQ CR tablet    K-DUR/KLOR-CON M    30 tablet    Take 1 tablet (10 mEq) by mouth daily    Paroxysmal atrial fibrillation (H)       predniSONE 5 MG tablet    DELTASONE    30 tablet    Take 1 tablet (5 mg) by mouth daily    Diffuse large B-cell lymphoma, unspecified body region (H)       tolterodine 4 MG 24 hr capsule    DETROL LA    30 capsule    Take 1 capsule (4 mg) by mouth daily    Urinary urgency       traMADol 50 MG tablet    ULTRAM    30 tablet    Take 1 tablet (50 mg) by mouth every 6 hours as needed for moderate pain    Diffuse large B-cell lymphoma of extranodal site (H)

## 2017-11-16 NOTE — PROGRESS NOTES
Infusion Nursing Note:  Amelia Michel presents today for platelets.    Patient seen by provider today: No   present during visit today: Not Applicable.    Note: Patient states that she stopped taking her lovenox on Monday 11/13.  After her injections the previous 3 days she had to hold pressure on the sites for up to 45 minutes to get the bleeding to stop.  She has some bruising, but denies any other bleeding symptoms.  She denies any recent fevers or chills. The wound on her left arm is doing ok, dressing was changed by home care today. She also has a new wound on her right ankle that started approximately a week to 10 days ago. She had been wearing a brace that rubbed and caused a sore. She is applying bacitracin and keeping the site covered with a bandaid. She thinks that it is improving.  She feels generally edematous after taking prednisone last week, but thinks that it is improving day by day.      Intravenous Access:  Peripheral IV placed in lab by vascular access    Treatment Conditions:  Lab reported that patient's first specimen clotted and couldn't be sure of the accuracy of her results. Repeat CBC was drawn by venipuncture.      Lab Results   Component Value Date    HGB 11.7 11/16/2017     Lab Results   Component Value Date    WBC 1.3 11/16/2017      Lab Results   Component Value Date    ANEU 0.3 11/16/2017     Lab Results   Component Value Date    PLT 6 11/16/2017      Results reviewed, labs MET treatment parameters, ok to proceed with treatment.  Blood transfusion consent signed 8/31/17 (telephone consent).  Neutropenic precautions reviewed with patient and spouse  Patient premedicated with tylenol, benadryl, and hydrocortisone due to previous hx of hives with platelets    11/16/2017 1630 TORB Lenore Rodriguez MD/Harmony Law,RN  -transfuse 2 packs of platelets today for platelet count 6,000   -have patient return on Monday 11/20 for possible platelet transfusion  -patient should continue to  hold her lovenox  -patient should start on levaquin as ordered while neutropenic, script sent to the clinic pharmacy      Post Infusion Assessment:  Patient tolerated infusion without incident.  Blood return noted pre and post infusion.  Site patent and intact, free from redness, edema or discomfort.  No evidence of extravasations.  Access discontinued per protocol.    Discharge Plan:   Prescription refills given for multiple medications. Patient picked up at the pharmacy following infusion  Discharge instructions reviewed with: Patient.  Patient and/or family verbalized understanding of discharge instructions and all questions answered.  Copy of AVS reviewed with patient and/or family.  Patient will return 11/20 for next appointment.  Patient discharged in stable condition accompanied by: self and .  Departure Mode: Ambulatory.  Face to Face time: 5 minutes.    Harmony Law RN

## 2017-11-16 NOTE — NURSING NOTE
Chief Complaint   Patient presents with     Blood Draw     IV placed by vascular access     Vitals done and labs drawn off IV, see flow sheets.  CONCEPCION CARBONE, CMA

## 2017-11-17 RX ORDER — ACETAMINOPHEN 325 MG/1
325 TABLET ORAL ONCE
Status: CANCELLED
Start: 2017-11-17 | End: 2017-11-17

## 2017-11-17 RX ORDER — DIPHENHYDRAMINE HCL 25 MG
25 CAPSULE ORAL
Status: CANCELLED
Start: 2017-11-17

## 2017-11-19 LAB
BLD PROD TYP BPU: NORMAL
NUM BPU REQUESTED: 1

## 2017-11-20 ENCOUNTER — INFUSION THERAPY VISIT (OUTPATIENT)
Dept: ONCOLOGY | Facility: CLINIC | Age: 57
End: 2017-11-20
Attending: PHYSICIAN ASSISTANT
Payer: COMMERCIAL

## 2017-11-20 ENCOUNTER — APPOINTMENT (OUTPATIENT)
Dept: LAB | Facility: CLINIC | Age: 57
End: 2017-11-20
Attending: PHYSICIAN ASSISTANT
Payer: COMMERCIAL

## 2017-11-20 VITALS
WEIGHT: 118.6 LBS | DIASTOLIC BLOOD PRESSURE: 69 MMHG | TEMPERATURE: 97.5 F | SYSTOLIC BLOOD PRESSURE: 123 MMHG | BODY MASS INDEX: 24.79 KG/M2 | OXYGEN SATURATION: 93 % | HEART RATE: 59 BPM | RESPIRATION RATE: 16 BRPM

## 2017-11-20 DIAGNOSIS — C83.398 DIFFUSE LARGE B-CELL LYMPHOMA OF EXTRANODAL SITE: Primary | ICD-10-CM

## 2017-11-20 LAB
BASOPHILS # BLD AUTO: 0.1 10E9/L (ref 0–0.2)
BASOPHILS NFR BLD AUTO: 1.8 %
BLD PROD TYP BPU: NORMAL
BLD UNIT ID BPU: 0
BLOOD PRODUCT CODE: NORMAL
BPU ID: NORMAL
DIFFERENTIAL METHOD BLD: ABNORMAL
EOSINOPHIL # BLD AUTO: 0.1 10E9/L (ref 0–0.7)
EOSINOPHIL NFR BLD AUTO: 0.9 %
ERYTHROCYTE [DISTWIDTH] IN BLOOD BY AUTOMATED COUNT: 12.9 % (ref 10–15)
HCT VFR BLD AUTO: 34.2 % (ref 35–47)
HGB BLD-MCNC: 11.7 G/DL (ref 11.7–15.7)
LYMPHOCYTES # BLD AUTO: 0.6 10E9/L (ref 0.8–5.3)
LYMPHOCYTES NFR BLD AUTO: 8.9 %
MACROCYTES BLD QL SMEAR: PRESENT
MCH RBC QN AUTO: 39.3 PG (ref 26.5–33)
MCHC RBC AUTO-ENTMCNC: 34.2 G/DL (ref 31.5–36.5)
MCV RBC AUTO: 115 FL (ref 78–100)
MONOCYTES # BLD AUTO: 0.3 10E9/L (ref 0–1.3)
MONOCYTES NFR BLD AUTO: 4.5 %
MYELOCYTES # BLD: 0.1 10E9/L
MYELOCYTES NFR BLD MANUAL: 1.8 %
NEUTROPHILS # BLD AUTO: 5.3 10E9/L (ref 1.6–8.3)
NEUTROPHILS NFR BLD AUTO: 79.4 %
PLATELET # BLD AUTO: 52 10E9/L (ref 150–450)
PLATELET # BLD EST: ABNORMAL 10*3/UL
PROMYELOCYTES # BLD MANUAL: 0.2 10E9/L
PROMYELOCYTES NFR BLD MANUAL: 2.7 %
RBC # BLD AUTO: 2.98 10E12/L (ref 3.8–5.2)
TRANSFUSION STATUS PATIENT QL: NORMAL
TRANSFUSION STATUS PATIENT QL: NORMAL
WBC # BLD AUTO: 6.7 10E9/L (ref 4–11)

## 2017-11-20 PROCEDURE — 36415 COLL VENOUS BLD VENIPUNCTURE: CPT

## 2017-11-20 PROCEDURE — 85025 COMPLETE CBC W/AUTO DIFF WBC: CPT | Performed by: INTERNAL MEDICINE

## 2017-11-20 NOTE — MR AVS SNAPSHOT
After Visit Summary   11/20/2017    Amelia Michel    MRN: [id number]           Patient Information     Date Of Birth          1960        Visit Information        Provider Department      11/20/2017 12:00 PM UC 15 ATC; UC ONCOLOGY INFUSION [facility]        Today's Diagnoses     Diffuse large B-cell lymphoma of extranodal site (H)    -  1       Follow-ups after your visit        Your next 10 appointments already scheduled     Nov 29, 2017  7:15 AM CST   [facility] Draw with [facility] LAB DRAW   [facility] Lab Draw ([facility])    [address] [place] [phone]   [phone]            Nov 29, 2017  7:50 AM CST   (Arrive by 7:35 AM)   Return Visit with Kelly Mcginnis PA-C   [facility] ([facility])    [address] [place] [phone]   [phone]            Nov 29, 2017  8:30 AM CST   Infusion 360 with UC ONCOLOGY INFUSION, UC 20 ATC   [facility] ([facility])    [address] [place] [phone]   [phone]            Dec 18, 2017  8:40 AM CST   (Arrive by 8:25 AM)   Lumbar Puncture with Kelly Mcginnis PA-C   [facility] ([facility])    [address] [place] [phone]   [phone]            Dec 18, 2017  9:00 AM CST   XR LUMBAR PUNCTURE INTRATHECAL CHEMO ADMIN with UUXR4, UU NEURO RAD   [facility] [place],  Radiology ([facility])    [address] [facility] [address]   [phone]           For nerve root injection, please send or bring copies of any MRIs or other scans you have had.  Bring a list of your current medicines to your exam. (Include vitamins, minerals and over-the-counter medicines.) Leave your valuables at home.   Plan to have someone drive you home afterward.  Stop taking the following medicines (but talk to your doctor first):   If you take blood thinners, you may need to stop taking them a few days before treatment. Talk to your doctor before stopping these medicines.Stop taking Coumadin (warfarin) 3 days before treatment. Restart the day after treatment.   If you take Plavix, Ticlid, Pletal or Persantine, please ask your doctor if you should stop these medicines. You may need extra tests on the morning of your scan.   If you take Xarelto (Rivaroxaban), you may need to stop taking it 24 hours before treatment. Talk to your doctor before stopping this medicine. Restart the day after treatment.  You may take your other medicines as normal.  Stop all food and drink (including water) 3 hours before your test or treatment.  Please tell the doctor:   If you are allergic to X-ray dye (contrast fluid).   If you may be pregnant.  Injections take about 30 to 45 minutes. Most people spend up to 2 hours in the clinic or hospital.  Please call the Imaging Department at your exam site with any questions.            Jan 08, 2018 10:30 AM CST   (Arrive by 10:15 AM)   Return Visit with Archana Green PA-C   [facility] ([facility])    [address] [place] [id number] [phone]            Jan 12, 2018  8:00 AM CST   (Arrive by 7:45 AM)   Return Visit with Pj Cunha MD   [facility] ([facility])    [address] [place] [id number] [phone]            Jan 31, 2018 10:00 AM CST   (Arrive by 9:45 AM)   RETURN ARRHYTHMIA with Juan Eaotn MD   [facility] ([facility])    [address] [place] [id number] [phone]              Future tests that were ordered for you today     Open Standing Orders         Priority Remaining Interval Expires Ordered    Platelets prepare order unit Routine 99/100 CONDITIONAL (SPECIFY) BLOOD  11/17/2017            Who to contact     If you have questions or need follow up information about today's clinic visit or your schedule please contact [facility] directly at [phone].  Normal or non-critical lab and imaging results will be communicated to you by MyChart, letter or phone within 4 business days after the clinic has received the results. If you do not hear from us within 7 days, please contact the clinic through [employer] or phone. If you have a critical or abnormal lab result, we will notify you by phone as soon as possible.  Submit refill requests through [employer] or call your pharmacy and they will forward the refill request to us. Please allow 3 business days for your refill to be completed.          Additional Information About Your Visit        MyChart Information     [employer] gives you secure access to your electronic health record. If you see a primary care provider, you can also send messages to your care team and make appointments. If you have questions, please call your primary care clinic.  If you do not have a primary care provider, please call [phone] and they will assist you.        Care EveryWhere ID     This is your Care EveryWhere ID. This could be used by other organizations to access your [facility] medical records  [id number]        Your Vitals Were     Pulse Temperature Respirations Pulse Oximetry BMI (Body Mass Index)       59 97.5  F (36.4  C) (Oral) 16 93% 24.79 kg/m2        Blood Pressure from Last 3 Encounters:   11/20/17 123/69   11/16/17 150/84   11/08/17 115/67    Weight from Last 3 Encounters:   11/20/17 53.8 kg (118 lb 9.6 oz)   11/16/17 54.3 kg (119 lb 9.6 oz)   11/07/17 54.5 kg (120 lb 1.6 oz)              We Performed the Following     Blood component     CBC with platelets differential     Platelets prepare order unit         Primary Care Provider Office Phone # Fax #    Comfort Sabillon [phone] [address] [facility] [address]        Equal Access to Services     CATINA MENDEZBHAVESH : Kay laurita mcguire prahci Flores, waaxda luqadaha, qaybta kaalmada catalino, durga barriosall henny. So [facility] [phone].    ATENCIÓN: Si habla español, tiene a sarmiento disposición servicios gratuitos de asistencia lingüística. Llame al [phone].    We comply with applicable federal civil rights laws and [place] laws. We do not discriminate on the basis of race, color, national origin, age, disability, sex, sexual orientation, or gender identity.            Thank you!     Thank you for choosing [facility]  for your care. Our goal is always to provide you with excellent care. Hearing back from our patients is one way we can continue to improve our services. Please take a few minutes to complete the written survey that you may receive in the mail after your visit with us. Thank you!             Your Updated Medication List - Protect others around you: Learn how to safely use, store and throw away your medicines at www.disposemymeds.org.          This list is accurate as of: 11/20/17  1:36 PM.  Always use your most recent med list.                   Brand Name Dispense Instructions for use Diagnosis    acetaminophen 325 MG tablet    TYLENOL     Take 2 tablets (650 mg) by mouth every 4 hours as needed for mild pain, fever or headaches    Diffuse large B-cell lymphoma of extranodal site (H)       acyclovir 400 MG tablet    ZOVIRAX    60 tablet    Take 1 tablet (400 mg) by mouth 2 times daily    Diffuse large B-cell lymphoma, unspecified body region (H)       ascorbic acid 500 MG Tabs     30 tablet    Take 1 tablet (500 mg) by mouth daily    Diffuse large B-cell lymphoma, unspecified body region (H)       atenolol 25 MG tablet    TENORMIN    60 tablet    Take 1 tablet (25 mg) by mouth 2 times  daily    Atrial tachycardia (H)       BENADRYL ALLERGY PO      Take 50 mg by mouth        calcium polycarbophil 625 MG tablet    FIBERCON     Take 1 tablet by mouth 2 times daily        calcium-vitamin D 600-400 MG-UNIT per tablet    CALTRATE    60 tablet    Take 2 tablets by mouth every morning    Diffuse large B-cell lymphoma, unspecified body region (H)       cetirizine 10 MG tablet    zyrTEC    30 tablet    Take 1 tablet (10 mg) by mouth daily    Hypophosphatemia       digoxin 125 MCG tablet    LANOXIN    30 tablet    Take 1 tablet (125 mcg) by mouth daily    Atrial tachycardia (H)       enoxaparin 40 MG/0.4ML injection    LOVENOX    30 Syringe    Inject 0.4 mLs (40 mg) Subcutaneous daily    VTE (venous thromboembolism), Paroxysmal atrial fibrillation (H)       fluconazole 100 MG tablet    DIFLUCAN    30 tablet    Take 1 tablet (100 mg) by mouth daily    Diffuse large B-cell lymphoma, unspecified body region (H)       gabapentin 100 MG capsule    NEURONTIN    120 capsule    Take 2 capsules (200 mg) by mouth 3 times daily    Critical illness myopathy       HYDROCORTISONE PO      Take 100 mg by mouth IV        levofloxacin 250 MG tablet    LEVAQUIN    30 tablet    Take 1 tablet (250 mg) by mouth daily    Chemotherapy-induced neutropenia (H)       lidocaine visc 2% 2.5mL/5mL & maalox/mylanta w/ simeth 2.5mL/5mL & diphenhydrAMINE 5mg/5mL Susp suspension    [facility]     Swish and swallow 10 mLs in mouth 2 times daily        multivitamin, therapeutic with minerals Tabs tablet     30 each    Take 1 tablet by mouth daily    Diffuse large B-cell lymphoma, unspecified body region (H)       ondansetron 8 MG ODT tab    ZOFRAN-ODT    20 tablet    Take 1 tablet (8 mg) by mouth every 8 hours as needed    Diffuse large B-cell lymphoma of extranodal site (H)       potassium & sodium phosphates 280-160-250 MG Packet    NEUTRA-PHOS    60 packet    Take 1 packet by mouth 2 times daily    Hypophosphatemia        potassium chloride SA 10 MEQ CR tablet    K-DUR/KLOR-CON M    30 tablet    Take 1 tablet (10 mEq) by mouth daily    Paroxysmal atrial fibrillation (H)       predniSONE 5 MG tablet    DELTASONE    30 tablet    Take 1 tablet (5 mg) by mouth daily    Diffuse large B-cell lymphoma, unspecified body region (H)       tolterodine 4 MG 24 hr capsule    DETROL LA    30 capsule    Take 1 capsule (4 mg) by mouth daily    Urinary urgency       traMADol 50 MG tablet    ULTRAM    30 tablet    Take 1 tablet (50 mg) by mouth every 6 hours as needed for moderate pain    Diffuse large B-cell lymphoma of extranodal site (H)

## 2017-11-20 NOTE — PROGRESS NOTES
Patient arrived to infusion for possible platelet transfusion. Platelets 52; no transfusion needed.    Patient complains of scratchy throat that is improving. Denies complaints of dyspnea, nausea, bleeding, or pain.    TORB per Dr. Rodriguez on 11/20/2017 at 1220:  -Resume prophylactic lovenox  -Stop taking levaquin  -No additional lab/infusion appointments needed.    This information was relayed to patient and spouseand they verbalized understanding

## 2017-11-20 NOTE — NURSING NOTE
Chief Complaint   Patient presents with     Blood Draw     IV Placement and draw 24G Needle in right arm, vitals taken      Samia Xie CMA

## 2017-11-28 NOTE — CONSULTS
Laboratory Medicine and Pathology  Transfusion Medicine- Transfusion Reaction    Amelia Michel MRN# [id number]   YOB: 1960 Age: 57 year old   Date of Reaction: 2017             Transfusion Reaction Evaluation   Impression  This is a febrile non-hemolytic transfusion reaction (FNHTR) of probable imputability.  The patient experienced a rise in temperature of 2.1 degrees F, following a transfusion with plasma which was immediately followed by red blood cell transfusion, and occurred during the RBC transfusion.    Also, there was a drop in systolic blood pressure from 129 to 109 mmHg, although this does not meet the criteria for a definitive hypotensive transfusion reaction.    Final culture results are pending.    Recommendation    Transfuse as needed.       ----------------------------------    History  The patient is a 56 year old woman who presented with cardiac arrest on , and is admitted on  for additional work up.  She has a history of rheumatoid arthritis, VSD with repair (), and worsening LUE wound pain and fever.  She was subsequently diagnosed with diffuse large B-cell lymphoma (DLBCL), and is receiving chemotherapy.  She received one unit each of plasma and RBC on  into .    Plasma: 20:21 - 22:47, transfusion time  RBC: 22:55-01:10 (2017), transfusion time    Post-Transfusion Events  None    Reported Symptoms  Fever, chills and hypotension.    Vital Signs (From EPIC Blood Administration Flowsheet)  Pre Transfusion 22:47   Temp 98.2, HR 64, /48, RR 29, SpO2 NA    During Transfusion 23:14              Temp 98.9, HR 65, /62, RR 19, SpO2 NA    Post Transfusion 01:10   Temp 100.3, HR 67, /56, RR 20, SpO2 NA    Blood Bank Investigation  Product Type: Plasma and RBC  Unit Number:  [id number] and  [id number]  Amount Remaininml of RBC unit  Transfusion History: Yes  Transfusion Reaction History: No  Type and Screen History: O  negative, negative antibody screen.  Post-Transfusion Clerical Check: Correct  ABO/Rh: The RBC unit type was O neg and the patient was O neg. The unit was compatible.  TODD: Negative  Post-Transfusion Plasma: Straw colored    Both units were cultured and Gram stain was performed after adding 10 ml of saline prior to inoculating the blood culture bottles. Gram stain showed no organisms and culture is no growth to date for both units.    [facility]  Case Definition: febrile non-hemolytic transfusion reaction (FNHTR)  Severity: non-severe  Imputability: probable    Topher Flores MD  Blood Bank Fellow  [phone] pager  [phone] office  [phone] blood bank       Addendum:   Final cultures are negative for growth.              Melquiades Clark MD, PhD    Attestation:  I have reviewed the transfusion investigation and discussed it with Dr. Flores. I agree with the information, assessment, and recommendations in the above note.                                                                                                       Melquiades Clark MD, PhD

## 2017-11-29 ENCOUNTER — ONCOLOGY VISIT (OUTPATIENT)
Dept: ONCOLOGY | Facility: CLINIC | Age: 57
End: 2017-11-29
Attending: PHYSICIAN ASSISTANT
Payer: COMMERCIAL

## 2017-11-29 ENCOUNTER — APPOINTMENT (OUTPATIENT)
Dept: LAB | Facility: CLINIC | Age: 57
End: 2017-11-29
Attending: INTERNAL MEDICINE
Payer: COMMERCIAL

## 2017-11-29 ENCOUNTER — INFUSION THERAPY VISIT (OUTPATIENT)
Dept: ONCOLOGY | Facility: CLINIC | Age: 57
End: 2017-11-29
Attending: INTERNAL MEDICINE
Payer: COMMERCIAL

## 2017-11-29 VITALS
HEART RATE: 69 BPM | TEMPERATURE: 97.8 F | HEIGHT: 58 IN | BODY MASS INDEX: 25.48 KG/M2 | RESPIRATION RATE: 18 BRPM | SYSTOLIC BLOOD PRESSURE: 124 MMHG | DIASTOLIC BLOOD PRESSURE: 69 MMHG | OXYGEN SATURATION: 99 % | WEIGHT: 121.4 LBS

## 2017-11-29 DIAGNOSIS — T14.8XXA OPEN WOUND: ICD-10-CM

## 2017-11-29 DIAGNOSIS — G72.81 CRITICAL ILLNESS MYOPATHY: ICD-10-CM

## 2017-11-29 DIAGNOSIS — C83.398 DIFFUSE LARGE B-CELL LYMPHOMA OF EXTRANODAL SITE: Primary | ICD-10-CM

## 2017-11-29 DIAGNOSIS — E87.6 HYPOKALEMIA: ICD-10-CM

## 2017-11-29 DIAGNOSIS — R47.89 WORD FINDING DIFFICULTY: ICD-10-CM

## 2017-11-29 DIAGNOSIS — R53.81 PHYSICAL DECONDITIONING: ICD-10-CM

## 2017-11-29 DIAGNOSIS — I48.0 PAROXYSMAL ATRIAL FIBRILLATION (H): ICD-10-CM

## 2017-11-29 LAB
ALBUMIN SERPL-MCNC: 3.4 G/DL (ref 3.4–5)
ALP SERPL-CCNC: 96 U/L (ref 40–150)
ALT SERPL W P-5'-P-CCNC: 52 U/L (ref 0–50)
ANION GAP SERPL CALCULATED.3IONS-SCNC: 7 MMOL/L (ref 3–14)
AST SERPL W P-5'-P-CCNC: 50 U/L (ref 0–45)
BASOPHILS # BLD AUTO: 0.1 10E9/L (ref 0–0.2)
BASOPHILS NFR BLD AUTO: 0.9 %
BILIRUB SERPL-MCNC: 0.3 MG/DL (ref 0.2–1.3)
BUN SERPL-MCNC: 14 MG/DL (ref 7–30)
CALCIUM SERPL-MCNC: 9.7 MG/DL (ref 8.5–10.1)
CHLORIDE SERPL-SCNC: 106 MMOL/L (ref 94–109)
CO2 SERPL-SCNC: 27 MMOL/L (ref 20–32)
CREAT SERPL-MCNC: 0.57 MG/DL (ref 0.52–1.04)
DIFFERENTIAL METHOD BLD: ABNORMAL
EOSINOPHIL # BLD AUTO: 0.1 10E9/L (ref 0–0.7)
EOSINOPHIL NFR BLD AUTO: 0.9 %
ERYTHROCYTE [DISTWIDTH] IN BLOOD BY AUTOMATED COUNT: 14.4 % (ref 10–15)
GFR SERPL CREATININE-BSD FRML MDRD: >90 ML/MIN/1.7M2
GLUCOSE SERPL-MCNC: 115 MG/DL (ref 70–99)
HCT VFR BLD AUTO: 34.2 % (ref 35–47)
HGB BLD-MCNC: 11.6 G/DL (ref 11.7–15.7)
LYMPHOCYTES # BLD AUTO: 0.6 10E9/L (ref 0.8–5.3)
LYMPHOCYTES NFR BLD AUTO: 8.8 %
MACROCYTES BLD QL SMEAR: PRESENT
MCH RBC QN AUTO: 39.9 PG (ref 26.5–33)
MCHC RBC AUTO-ENTMCNC: 33.9 G/DL (ref 31.5–36.5)
MCV RBC AUTO: 118 FL (ref 78–100)
METAMYELOCYTES # BLD: 0.1 10E9/L
METAMYELOCYTES NFR BLD MANUAL: 1.8 %
MONOCYTES # BLD AUTO: 0.7 10E9/L (ref 0–1.3)
MONOCYTES NFR BLD AUTO: 11.4 %
NEUTROPHILS # BLD AUTO: 4.8 10E9/L (ref 1.6–8.3)
NEUTROPHILS NFR BLD AUTO: 76.2 %
PLATELET # BLD AUTO: 133 10E9/L (ref 150–450)
PLATELET # BLD EST: ABNORMAL 10*3/UL
POTASSIUM SERPL-SCNC: 3.9 MMOL/L (ref 3.4–5.3)
PROT SERPL-MCNC: 6.3 G/DL (ref 6.8–8.8)
RBC # BLD AUTO: 2.91 10E12/L (ref 3.8–5.2)
SODIUM SERPL-SCNC: 141 MMOL/L (ref 133–144)
WBC # BLD AUTO: 6.3 10E9/L (ref 4–11)

## 2017-11-29 PROCEDURE — 96375 TX/PRO/DX INJ NEW DRUG ADDON: CPT

## 2017-11-29 PROCEDURE — 40000141 ZZH STATISTIC PERIPHERAL IV START W/O US GUIDANCE

## 2017-11-29 PROCEDURE — 80053 COMPREHEN METABOLIC PANEL: CPT | Performed by: INTERNAL MEDICINE

## 2017-11-29 PROCEDURE — 96411 CHEMO IV PUSH ADDL DRUG: CPT

## 2017-11-29 PROCEDURE — 25000128 H RX IP 250 OP 636: Mod: ZF | Performed by: PHYSICIAN ASSISTANT

## 2017-11-29 PROCEDURE — 96377 APPLICATON ON-BODY INJECTOR: CPT | Mod: 59

## 2017-11-29 PROCEDURE — 96415 CHEMO IV INFUSION ADDL HR: CPT

## 2017-11-29 PROCEDURE — 96417 CHEMO IV INFUS EACH ADDL SEQ: CPT

## 2017-11-29 PROCEDURE — 25000132 ZZH RX MED GY IP 250 OP 250 PS 637: Mod: ZF | Performed by: PHYSICIAN ASSISTANT

## 2017-11-29 PROCEDURE — 99213 OFFICE O/P EST LOW 20 MIN: CPT | Mod: ZF

## 2017-11-29 PROCEDURE — 99214 OFFICE O/P EST MOD 30 MIN: CPT | Mod: ZP | Performed by: PHYSICIAN ASSISTANT

## 2017-11-29 PROCEDURE — 85025 COMPLETE CBC W/AUTO DIFF WBC: CPT | Performed by: INTERNAL MEDICINE

## 2017-11-29 PROCEDURE — 96413 CHEMO IV INFUSION 1 HR: CPT

## 2017-11-29 PROCEDURE — 96367 TX/PROPH/DG ADDL SEQ IV INF: CPT

## 2017-11-29 RX ORDER — POTASSIUM CHLORIDE 1.5 G/1.58G
10 POWDER, FOR SOLUTION ORAL DAILY
Qty: 15 TABLET | Refills: 3 | Status: SHIPPED | OUTPATIENT
Start: 2017-11-29 | End: 2018-01-24

## 2017-11-29 RX ORDER — GABAPENTIN 100 MG/1
200 CAPSULE ORAL 3 TIMES DAILY
Qty: 180 CAPSULE | Refills: 3 | Status: SHIPPED | OUTPATIENT
Start: 2017-11-29 | End: 2018-04-03

## 2017-11-29 RX ORDER — DIPHENHYDRAMINE HCL 25 MG
50 CAPSULE ORAL ONCE
Status: CANCELLED
Start: 2017-11-29

## 2017-11-29 RX ORDER — EPINEPHRINE 0.3 MG/.3ML
INJECTION SUBCUTANEOUS
Status: DISCONTINUED
Start: 2017-11-29 | End: 2017-11-29 | Stop reason: WASHOUT

## 2017-11-29 RX ORDER — PALONOSETRON 0.05 MG/ML
0.25 INJECTION, SOLUTION INTRAVENOUS ONCE
Status: CANCELLED
Start: 2017-11-29

## 2017-11-29 RX ORDER — PALONOSETRON 0.05 MG/ML
0.25 INJECTION, SOLUTION INTRAVENOUS ONCE
Status: COMPLETED | OUTPATIENT
Start: 2017-11-29 | End: 2017-11-29

## 2017-11-29 RX ORDER — MEPERIDINE HYDROCHLORIDE 25 MG/ML
25 INJECTION INTRAMUSCULAR; INTRAVENOUS; SUBCUTANEOUS
Status: CANCELLED
Start: 2017-11-29

## 2017-11-29 RX ORDER — DIPHENHYDRAMINE HCL 25 MG
50 CAPSULE ORAL ONCE
Status: COMPLETED | OUTPATIENT
Start: 2017-11-29 | End: 2017-11-29

## 2017-11-29 RX ORDER — POTASSIUM CHLORIDE 750 MG/1
TABLET, EXTENDED RELEASE ORAL
COMMUNITY
Start: 2017-11-15 | End: 2017-11-29

## 2017-11-29 RX ORDER — DIPHENHYDRAMINE HYDROCHLORIDE 50 MG/ML
50 INJECTION INTRAMUSCULAR; INTRAVENOUS
Status: CANCELLED
Start: 2017-11-29

## 2017-11-29 RX ORDER — ALBUTEROL SULFATE 90 UG/1
1-2 AEROSOL, METERED RESPIRATORY (INHALATION)
Status: CANCELLED
Start: 2017-11-29

## 2017-11-29 RX ORDER — ACETAMINOPHEN 325 MG/1
650 TABLET ORAL ONCE
Status: COMPLETED | OUTPATIENT
Start: 2017-11-29 | End: 2017-11-29

## 2017-11-29 RX ORDER — METHYLPREDNISOLONE SODIUM SUCCINATE 125 MG/2ML
125 INJECTION, POWDER, LYOPHILIZED, FOR SOLUTION INTRAMUSCULAR; INTRAVENOUS
Status: CANCELLED
Start: 2017-11-29

## 2017-11-29 RX ORDER — PREDNISONE 50 MG/1
50 TABLET ORAL 2 TIMES DAILY
Qty: 10 TABLET | Refills: 0 | Status: SHIPPED | OUTPATIENT
Start: 2017-11-29 | End: 2017-12-04

## 2017-11-29 RX ORDER — EPINEPHRINE 1 MG/ML
0.3 INJECTION, SOLUTION, CONCENTRATE INTRAVENOUS EVERY 5 MIN PRN
Status: CANCELLED | OUTPATIENT
Start: 2017-11-29

## 2017-11-29 RX ORDER — LORAZEPAM 2 MG/ML
0.5 INJECTION INTRAMUSCULAR EVERY 4 HOURS PRN
Status: CANCELLED
Start: 2017-11-29

## 2017-11-29 RX ORDER — MEPERIDINE HYDROCHLORIDE 25 MG/ML
25 INJECTION INTRAMUSCULAR; INTRAVENOUS; SUBCUTANEOUS EVERY 30 MIN PRN
Status: CANCELLED | OUTPATIENT
Start: 2017-11-29

## 2017-11-29 RX ORDER — ALBUTEROL SULFATE 0.83 MG/ML
2.5 SOLUTION RESPIRATORY (INHALATION)
Status: CANCELLED | OUTPATIENT
Start: 2017-11-29

## 2017-11-29 RX ORDER — ACETAMINOPHEN 325 MG/1
650 TABLET ORAL ONCE
Status: CANCELLED
Start: 2017-11-29

## 2017-11-29 RX ORDER — DIBASIC SODIUM PHOSPHATE, MONOBASIC POTASSIUM PHOSPHATE AND MONOBASIC SODIUM PHOSPHATE 852; 155; 130 MG/1; MG/1; MG/1
TABLET ORAL
COMMUNITY
Start: 2017-11-08 | End: 2017-11-29

## 2017-11-29 RX ORDER — SODIUM CHLORIDE 9 MG/ML
1000 INJECTION, SOLUTION INTRAVENOUS CONTINUOUS PRN
Status: CANCELLED
Start: 2017-11-29

## 2017-11-29 RX ORDER — EPINEPHRINE 0.3 MG/.3ML
0.3 INJECTION SUBCUTANEOUS EVERY 5 MIN PRN
Status: CANCELLED | OUTPATIENT
Start: 2017-11-29

## 2017-11-29 RX ADMIN — CYCLOPHOSPHAMIDE 1125 MG: 1 INJECTION, POWDER, FOR SOLUTION INTRAVENOUS; ORAL at 10:40

## 2017-11-29 RX ADMIN — VINCRISTINE SULFATE 2 MG: 1 INJECTION, SOLUTION INTRAVENOUS at 10:30

## 2017-11-29 RX ADMIN — SODIUM CHLORIDE 150 MG: 9 INJECTION, SOLUTION INTRAVENOUS at 09:38

## 2017-11-29 RX ADMIN — ACETAMINOPHEN 650 MG: 325 TABLET ORAL at 10:36

## 2017-11-29 RX ADMIN — DIPHENHYDRAMINE HYDROCHLORIDE 50 MG: 25 CAPSULE ORAL at 10:37

## 2017-11-29 RX ADMIN — RITUXIMAB 560 MG: 10 INJECTION, SOLUTION INTRAVENOUS at 11:15

## 2017-11-29 RX ADMIN — DOXORUBICIN HYDROCHLORIDE 75 MG: 2 INJECTION, SOLUTION INTRAVENOUS at 10:14

## 2017-11-29 RX ADMIN — PEGFILGRASTIM 6 MG: KIT SUBCUTANEOUS at 13:31

## 2017-11-29 RX ADMIN — PALONOSETRON HYDROCHLORIDE 0.25 MG: 0.25 INJECTION INTRAVENOUS at 09:40

## 2017-11-29 RX ADMIN — SODIUM CHLORIDE 250 ML: 9 INJECTION, SOLUTION INTRAVENOUS at 09:38

## 2017-11-29 ASSESSMENT — PAIN SCALES - GENERAL: PAINLEVEL: NO PAIN (0)

## 2017-11-29 NOTE — LETTER
11/29/2017      RE: Amelia Michel  [address]  [place] [address]       Heme/onc visit note  Nov 29, 2017    Reason for visit: follow up DLBCL    Cancer history: Amelia Michel is a 57 year old female with DLBCL. She had a complicated diagnosis as below:    1. History of Rheumatoid Arthritis status post long term treatment with methotrexate with recent significant complicated course with cardiac arrest, admission with hypotension, sepsis, ICU stay and intubation with subsequent additional readmission from TCU in 6/2017 for FUO with extensive work-up with eventual finding of progression cytopenias with eventual lymphoma diagnosis  2. Bone Marrow Biopsy: 7/12/2017: Highly suspicious for involvement by B cell lymphoma with foci of large atypical CD20+ cells  3. PET CT 7/19/2017: Extensive activity: Right paratracheal lesion with SUV 28, many liver lesions with SUV 15, cardiac lesion intraarterial septum, numerous bone lesions.  4. 7/21 Liver Biopsy: Consistent with Diffuse Large B Cell Lymphoma non-germinal center phenotype  -- FISH for myc, bcl2, bcl6 negative for double-hit lymphoma  5. IPI based on Age < 60 (0 points) + PS 2 (1 + point) + Stage IV Disease (1 point) + Extranodal disease > 1 site (1 point) + elevated LDH (1 point) = 4 Poor Risk Disease  6. Cycle 1 given as R-EPOCH Day 1 = 7/27/2017  -- complications of transfusion dependence and need for acute rehab placement (likely more result of extensive hospital stays)  - LP negative for CNS involvement 8/23/2017  7. Cycle #2: Transition to R-CHOP Day 1 = 8/22/2017  - Day 15 high dose MTX for CNS prophylaxis  - complicated by significant fluid overload and PICC associated DVT  8. Cycle #3 Day 1 = 9/20/2017  9. PET/CT 10/9 shows a complete remission by Lugano criteria.   10. Cycle #4 Day 1 10/19/17, day 20 IT MTX 11/7/17  11. Cycle #5 Day 1 11/8/17  12. Cycle #6 Day 1 11/29/17    Interval history:  Patient reports that she has intermittent  headaches that she feels are allergy related. She does take Zyrtec with relief. She feels she is having less problems with this now that it has gotten colder outside. She does have blurred vision and plans to follow-up with her eye doctor. She has had 1 episode of vomiting and diarrhea yesterday morning. No one else in her family was sick and she is unknown sure why this happened. She is feeling much better today. She reports that her urinary symptoms are improved with the use of Detrol. She continues to have unchanged neuropathy in her left hand in the thumb, second and third digit and in her right leg. She does wear a right leg brace and reports that she did have a wound on her ankle but that has since resolved. She plans to follow up with orthotics for refitting once she completes chemotherapy. She reports that her energy remains low. She is sleeping fair which is a chronic issue for her. She reports that her mood is generally okay except for when she takes the high-dose steroids and becomes quite irritable. She reports that her Lovenox sites get a little weepy but otherwise denies any bleeding issues. She has had difficulty with word finding since starting chemotherapy. She denies other concerns.    Review of Systems:  Patient denies any of the following except if noted above: fevers, chills, hearing changes, chest pain, dyspnea, abdominal pain, nausea, constipation, or urinary concerns.    Past medical history:  Patient Active Problem List   Diagnosis     HTN (hypertension)     Primary pulmonary hypertension (H)     Hyperlipidemia LDL goal <130     Other rheumatoid arthritis with rheumatoid factor of multiple sites     Lymphedema of both lower extremities     Atrial tachycardia (H)     Cardiogenic shock (H)     Acute respiratory failure with hypoxia (H)     Hypertension     Critical illness myopathy     Sepsis (H)     Wound of left upper extremity     Diffuse large B-cell lymphoma of extranodal site (H)      Diffuse large B cell lymphoma (H)     Febrile neutropenia (H)     Physical deconditioning     Health Care Home     DLBCL (diffuse large B cell lymphoma) (H)     H/O cardiac arrest       Medications:    Current Outpatient Prescriptions on File Prior to Visit:  levofloxacin (LEVAQUIN) 250 MG tablet Take 1 tablet (250 mg) by mouth daily   digoxin (LANOXIN) 125 MCG tablet Take 1 tablet (125 mcg) by mouth daily   enoxaparin (LOVENOX) 40 MG/0.4ML injection Inject 0.4 mLs (40 mg) Subcutaneous daily   potassium chloride SA (K-DUR/KLOR-CON M) 10 MEQ CR tablet Take 1 tablet (10 mEq) by mouth daily   potassium & sodium phosphates (NEUTRA-PHOS) 280-160-250 MG Packet Take 1 packet by mouth 2 times daily   tolterodine (DETROL LA) 4 MG 24 hr capsule Take 1 capsule (4 mg) by mouth daily   acyclovir (ZOVIRAX) 400 MG tablet Take 1 tablet (400 mg) by mouth 2 times daily   atenolol (TENORMIN) 25 MG tablet Take 1 tablet (25 mg) by mouth 2 times daily   fluconazole (DIFLUCAN) 100 MG tablet Take 1 tablet (100 mg) by mouth daily   DiphenhydrAMINE HCl (BENADRYL ALLERGY PO) Take 50 mg by mouth   HYDROCORTISONE PO Take 100 mg by mouth IV   magic mouthwash suspension (diphenhydrAMINE, lidocaine, aluminum-magnesium & simethicone) Swish and swallow 10 mLs in mouth 2 times daily   predniSONE (DELTASONE) 5 MG tablet Take 1 tablet (5 mg) by mouth daily   traMADol (ULTRAM) 50 MG tablet Take 1 tablet (50 mg) by mouth every 6 hours as needed for moderate pain   acetaminophen (TYLENOL) 325 MG tablet Take 2 tablets (650 mg) by mouth every 4 hours as needed for mild pain, fever or headaches   ondansetron (ZOFRAN-ODT) 8 MG ODT tab Take 1 tablet (8 mg) by mouth every 8 hours as needed   calcium polycarbophil (FIBERCON) 625 MG tablet Take 1 tablet by mouth 2 times daily   calcium-vitamin D (CALTRATE) 600-400 MG-UNIT per tablet Take 2 tablets by mouth every morning   multivitamin, therapeutic with minerals (THERA-VIT-M) TABS tablet Take 1 tablet by mouth  daily   ascorbic acid 500 MG TABS Take 1 tablet (500 mg) by mouth daily   predniSONE (DELTASONE) 50 MG tablet Take 1 tablet (50 mg) by mouth 2 times daily for 5 days Take on Days 1 through 5. Take first dose in AM prior to chemotherapy.   cetirizine (ZYRTEC) 10 MG tablet Take 1 tablet (10 mg) by mouth daily (Patient not taking: Reported on 11/29/2017)   [DISCONTINUED] gabapentin (NEURONTIN) 100 MG capsule Take 2 capsules (200 mg) by mouth 3 times daily     Current Facility-Administered Medications on File Prior to Visit:  [COMPLETED] 0.9% sodium chloride BOLUS   fosaprepitant (EMEND) 150 mg in NaCl 0.9 % intermittent infusion   palonosetron (ALOXI) injection 0.25 mg   acetaminophen (TYLENOL) tablet 650 mg   diphenhydrAMINE (BENADRYL) capsule 50 mg   riTUXimab (RITUXAN) 560 mg in NaCl 0.9 % 560 mL   DOXOrubicin (ADRIAMYCIN) 75 mg in 37.5 mL CHEMOTHERAPY   vinCRIStine (ONCOVIN) 2 mg in NaCl 0.9 % 30 mL CHEMOTHERAPY   cyclophosphamide (CYTOXAN) 1,125 mg in NaCl 0.9 % 331 mL CHEMOTHERAPY   pegfilgrastim (NEULASTA Onpro Kit) On-body injector 6 mg       Allergy:     Allergies   Allergen Reactions     Blood Transfusion Related (Informational Only) Hives     Hives with platelets. Give benadryl premedication.     Metoprolol Other (See Comments)     Pt and  report that metoprolol does not work for her and also reports feeling unwell with this medication. She has been able to tolerate atenolol, which as worked in controlling her HR.      No Clinical Screening - See Comments      Penicillin Allergy Skin Test not performed, see antimicrobial management team progress note 7/5/17.       Penicillins      Tape [Adhesive Tape] Rash       Physical exam  General: The patient is a pleasant female in no acute distress. She walks with a walker. Is able to get on and off the exam table unassisted.  /69 (BP Location: Right arm, Patient Position: Sitting, Cuff Size: Adult Regular)  Pulse 69  Temp 97.8  F (36.6  C) (Oral)   "Resp 18  Ht 1.473 m (4' 9.99\")  Wt 55.1 kg (121 lb 6.4 oz)  SpO2 99%  BMI 25.38 kg/m2  Wt Readings from Last 10 Encounters:   11/29/17 55.1 kg (121 lb 6.4 oz)   11/20/17 53.8 kg (118 lb 9.6 oz)   11/16/17 54.3 kg (119 lb 9.6 oz)   11/07/17 54.5 kg (120 lb 1.6 oz)   10/30/17 52.8 kg (116 lb 6.4 oz)   10/19/17 56.2 kg (123 lb 12.8 oz)   10/11/17 56 kg (123 lb 6.4 oz)   10/04/17 56.1 kg (123 lb 9.6 oz)   09/29/17 56.6 kg (124 lb 11.2 oz)   09/27/17 54.4 kg (120 lb)   HEENT: EOMI, PERRL. Sclerae are anicteric. Oral mucosa is pink and moist with no lesions or thrush.   Lymph: Neck is supple with no lymphadenopathy in the cervical or supraclavicular areas.   Heart: Regular rate and rhythm with 3/6 systolic murmur.   Lungs: Clear to auscultation bilaterally.   Abdomen: Bowel sounds present, soft, nontender with no palpable hepatosplenomegaly or masses.   Extremities: No lower extremity edema noted bilaterally, compression stockings in place.   Neuro: Cranial nerves II through XII are grossly intact.  Skin: Left arm with significant scarring over previous IV site. 1-2 cm open area with firm, white substance and blue suture.    Labs:   11/29/2017 07:28   Sodium 141   Potassium 3.9   Chloride 106   Carbon Dioxide 27   Urea Nitrogen 14   Creatinine 0.57   GFR Estimate >90   GFR Estimate If Black >90   Calcium 9.7   Anion Gap 7   Albumin 3.4   Protein Total 6.3 (L)   Bilirubin Total 0.3   Alkaline Phosphatase 96   ALT 52 (H)   AST 50 (H)   Glucose 115 (H)   WBC 6.3   Hemoglobin 11.6 (L)   Hematocrit 34.2 (L)   Platelet Count 133 (L)   RBC Count 2.91 (L)    (H)   MCH 39.9 (H)   MCHC 33.9   RDW 14.4   Diff Method Manual Differential   % Neutrophils 76.2   % Lymphocytes 8.8   % Monocytes 11.4   % Eosinophils 0.9   % Basophils 0.9   % Metamyelocytes 1.8   Absolute Neutrophil 4.8   Absolute Lymphocytes 0.6 (L)   Absolute Monocytes 0.7   Absolute Eosinophils 0.1   Absolute Basophils 0.1   Absolute Metamyelocytes 0.1 (H) "   Macrocytes Present   Platelet Estimate Confirming automa...     Assessment and plan:  Lymphoma: Got cycle #1 in the hospital and R-EPOCH chosen for infusional nature due to possible intracardiac involvement and also extensive disease to allow for slower lymphoma kill. Tolerated this well, aside from significant cytopenias (which were present at diagnosis). Then transitioned to R-CHOP to finish out a total of 6 cycles of chemotherapy (1 R-EPOCH and 5 R-CHOP) with initially planned for High Dose MTX on Day 15 of cycle 2,4, and 6 due to high IPI.   She had significant toxicity with the high dose methotrexate with PICC associated clot, significant volume overload, increased neuropathy and thus was changed to receive IT chemo as the prophylaxis given the risk/benefit ratio.  -She has been tolerating R-CHOP fairly well. She will continue with cycle 6 R-CHOP today.   -She will return on 12/18 for IT cytarabine.   -She will have a restaging PET/CT 7 weeks after completing her chemotherapy and will f/u with Dr. Rodriguez to review.    ID: Remains on acyclovir and fluconazole. Resume Levaquin when ANC<1.     Rheumatoid arthritis: is on prednisone 5 mg daily. Will continue this in between her chemo sessions.      CV: A. Fib. On digoxin and atenolol. Now on lovenox for DVT so anticoagulated. Will need to start aspirin if the lovenox is stopped.      PICC Associated RUE DVT: she was on therapetic lovenox and US 10/16 showed the clot cleared. She wishes to take ppx dose lovenox. Will hold this before procedures or if plt<50K.     Neuropathy:  Had it before chemo. stable continue with Neurontin. She is discharged from PT.     Chronic left arm wound: related to IV access in the past. She will continue with daily dressing changes. Will send her to see wound care nurse. Suspect needs debridement and removal of old suture.     Deconditioning and word finding difficulty: Will refer to cancer rehab PT/OT for both issues.     Heme:  Patient had more significant cytopenias with this last cycle. Will plan to check labs twice weekly with possible platelet transfusions during her morales. Hold Lovenox when platelets < 50,000. Transfuse if platelets <10, hgb <8.    Kelly Mcginnis PA-C  [facility]  [address] [place] [address] [phone]

## 2017-11-29 NOTE — NURSING NOTE
"Oncology Rooming Note    November 29, 2017 7:44 AM   Amelia Michel is a 57 year old female who presents for:    Chief Complaint   Patient presents with     Blood Draw     Labs drawn via  by RN. VS taken.     Oncology Clinic Visit     Return visit related to DLBCL     Initial Vitals: /69 (BP Location: Right arm, Patient Position: Sitting, Cuff Size: Adult Regular)  Pulse 69  Temp 97.8  F (36.6  C) (Oral)  Resp 18  Ht 1.473 m (4' 9.99\")  Wt 55.1 kg (121 lb 6.4 oz)  SpO2 99%  BMI 25.38 kg/m2 Estimated body mass index is 25.38 kg/(m^2) as calculated from the following:    Height as of this encounter: 1.473 m (4' 9.99\").    Weight as of this encounter: 55.1 kg (121 lb 6.4 oz). Body surface area is 1.5 meters squared.  No Pain (0) Comment: Data Unavailable   No LMP recorded. Patient is postmenopausal.  Allergies reviewed: Yes  Medications reviewed: Yes    Medications: MEDICATION REFILLS NEEDED TODAY. Provider was notified.  Pharmacy name entered into [employer]:    [facility] [place], [place] - [address] [facility] [place], [place] - [address] [place] [address] PHARM[address] [place] [address] - [address] [facility] HOME INFUSION  [facility] [place], [place] - [address] [facility] [phone]    Clinical concerns: Refill needed for Gabapentin 100 MG tablet. Provider was notified.    10 minutes for nursing intake (face to face time)     Pao Grier LPN            "

## 2017-11-29 NOTE — PATIENT INSTRUCTIONS
Contact Numbers    [facility] Main Line: [phone]  [facility] Triage:  [phone]    Call triage with chills and/or temperature greater than or equal to 100.5, uncontrolled nausea/vomiting, diarrhea, constipation, dizziness, shortness of breath, chest pain, bleeding, unexplained bruising, or any new/concerning symptoms, questions/concerns.     If you are having any concerning symptoms or wish to speak to a provider before your next infusion visit, please call your care coordinator or triage to notify them so we can adequately serve you.       After Hours: [phone]    If after hours, weekends, or holidays, call main hospital  and ask for Oncology doctor on call.           November 2017 Sunday Monday Tuesday Wednesday Thursday Friday Saturday                  1     2     3     4       5     6     7     [facility] LAB DRAW    6:30 AM   (15 min.)    [facility]RAW   [facility]     [facility] RETURN    6:45 AM   (50 min.)   Kelly Mcginnis PA-C [facility]     Admission    8:24 AM   Victor Manuel Blair MD   [address] [facility]   (Discharge: 11/7/2017)     PROCEDURE - 3 HR    9:30 AM   (180 min.)   U2A ROOM 10   [address] [facility]     XR LP INTRATHECAL CHEMO ADMIN   11:00 AM   (60 min.)   [id number]   [facility] [place],  Radiology     UM LUMBAR PUNCTURE   11:25 AM   (100 min.)   Kalpana Alvarado PA-C M [facility] 8     P ONC INFUSION 360    8:00 AM   (360 min.)    ONCOLOGY INFUSION   [facility] 9     10     11       12     13     14     15     16     [facility] LAB DRAW    3:00 PM   (15 min.)    [facility] LAB DRAW   [facility]     [facility] ONC INFUSION 120    3:30 PM   (120 min.)    ONCOLOGY INFUSION   [facility] 17     18       19     20     [facility] LAB DRAW   11:30 AM   (15 min.)    [facility] LAB DRAW   [facility]     [facility] ONC INFUSION 120   12:00 PM   (120 min.)    ONCOLOGY  INFUSION   [facility] 21     22     23     24     25  Happy Birthday!       26     27     28     29     [facility] LAB DRAW    7:15 AM   (15 min.)    M[facility] LAB DRAW   [facility] Lab Draw     [facility] RETURN    7:35 AM   (50 min.)   Kelly Mcginnis PA-C   [facility]     [facility] ONC INFUSION 360    8:30 AM   (360 min.)    ONCOLOGY INFUSION   [facility] 30 December 2017 Sunday Monday Tuesday Wednesday Thursday Friday Saturday                            1     2       3     4     5     [facility] LAB DRAW   11:00 AM   (15 min.)    M[facility] LAB DRAW   [facility] Lab Draw     [facility] ONC INFUSION 120   11:30 AM   (120 min.)    ONCOLOGY INFUSION   [facility] 6     7     8     [facility] LAB DRAW    9:30 AM   (15 min.)    [facility] LAB DRAW   [facility]     [facility] ONC INFUSION 120   10:00 AM   (120 min.)    ONCOLOGY INFUSION   [facility] 9       10     11     12     [facility] LAB DRAW    8:45 AM   (15 min.)    MASONIC LAB DRAW   [facility] Lab Draw     [facility] ONC INFUSION 120    9:30 AM   (120 min.)    ONCOLOGY INFUSION   [facility] 13     14     15     16       17     18     [facility] LUMBAR PUNCTURE    8:25 AM   (100 min.)   Kelly Mcginnis PA-C   [facility]     XR LP INTRATHECAL CHEMO ADMIN    9:00 AM   (60 min.)   [email or web address]   [facility] [place],  Radiology 19     20     21     22     23       24     25     26     27     28     29     30       31                                                Lab Results:  Recent Results (from the past 12 hour(s))   CBC with platelets differential    Collection Time: 11/29/17  7:28 AM   Result Value Ref Range    WBC 6.3 4.0 - 11.0 10e9/L    RBC Count 2.91 (L) 3.8 - 5.2 10e12/L    Hemoglobin 11.6 (L) 11.7 - 15.7 g/dL    Hematocrit 34.2 (L) 35.0 - 47.0 %     (H) 78 - 100 fl    MCH 39.9 (H)  26.5 - 33.0 pg    MCHC 33.9 31.5 - 36.5 g/dL    RDW 14.4 10.0 - 15.0 %    Platelet Count 133 (L) 150 - 450 10e9/L    Diff Method Manual Differential     % Neutrophils 76.2 %    % Lymphocytes 8.8 %    % Monocytes 11.4 %    % Eosinophils 0.9 %    % Basophils 0.9 %    % Metamyelocytes 1.8 %    Absolute Neutrophil 4.8 1.6 - 8.3 10e9/L    Absolute Lymphocytes 0.6 (L) 0.8 - 5.3 10e9/L    Absolute Monocytes 0.7 0.0 - 1.3 10e9/L    Absolute Eosinophils 0.1 0.0 - 0.7 10e9/L    Absolute Basophils 0.1 0.0 - 0.2 10e9/L    Absolute Metamyelocytes 0.1 (H) 0 10e9/L    Macrocytes Present     Platelet Estimate Confirming automated cell count    Comprehensive metabolic panel    Collection Time: 11/29/17  7:28 AM   Result Value Ref Range    Sodium 141 133 - 144 mmol/L    Potassium 3.9 3.4 - 5.3 mmol/L    Chloride 106 94 - 109 mmol/L    Carbon Dioxide 27 20 - 32 mmol/L    Anion Gap 7 3 - 14 mmol/L    Glucose 115 (H) 70 - 99 mg/dL    Urea Nitrogen 14 7 - 30 mg/dL    Creatinine 0.57 0.52 - 1.04 mg/dL    GFR Estimate >90 >60 mL/min/1.7m2    GFR Estimate If Black >90 >60 mL/min/1.7m2    Calcium 9.7 8.5 - 10.1 mg/dL    Bilirubin Total 0.3 0.2 - 1.3 mg/dL    Albumin 3.4 3.4 - 5.0 g/dL    Protein Total 6.3 (L) 6.8 - 8.8 g/dL    Alkaline Phosphatase 96 40 - 150 U/L    ALT 52 (H) 0 - 50 U/L    AST 50 (H) 0 - 45 U/L

## 2017-11-29 NOTE — PROGRESS NOTES
Infusion Nursing Note:  Amelia Michel presents today for Cycle 6, day 1 Rituximab, Adriamycin, Vincristine, Cyclophosphamide, and Prednisone.    Patient seen by provider today: Yes: CRISTA Gates   present during visit today: Not Applicable.    Note: Patient complains of fatigue and weakness today. Denies complaints of pain, dyspnea, nausea, diarrhea, and constipation today.    Neulasta Onpro On-Body injector applied to left arm at 1335 with light facing down.  Writer discussed Neulasta injection would start on 11/30 at 1630, approximately 27 hours after application applied today.  Written and Verbal instruction reviewed with patient.  Pt instructed when the dose delivery starts, it will take about 45 minutes to complete.  Pt aware Neulasta Onpro On-Body should have green flashing light and to call triage or on-call MD if injector flashes red or appears to be leaking. Pt aware to keep Onpro On-Body Neulasta 4 inches away from electrical equipment and to avoid showering 4 hours prior to injection.   Neulasta Onpro Lot number: [id number]    Intravenous Access:  Peripheral IV placed.    Treatment Conditions:  Lab Results   Component Value Date    HGB 11.6 11/29/2017     Lab Results   Component Value Date    WBC 6.3 11/29/2017      Lab Results   Component Value Date    ANEU 4.8 11/29/2017     Lab Results   Component Value Date     11/29/2017      Lab Results   Component Value Date     11/29/2017                   Lab Results   Component Value Date    POTASSIUM 3.9 11/29/2017           Lab Results   Component Value Date    MAG 1.7 09/11/2017            Lab Results   Component Value Date    CR 0.57 11/29/2017                   Lab Results   Component Value Date    VIKTORIYA 9.7 11/29/2017                Lab Results   Component Value Date    BILITOTAL 0.3 11/29/2017           Lab Results   Component Value Date    ALBUMIN 3.4 11/29/2017                    Lab Results   Component Value Date    ALT 52  11/29/2017           Lab Results   Component Value Date    AST 50 11/29/2017     Results reviewed, labs MET treatment parameters, ok to proceed with treatment.  ECHO/MUGA completed 9/11/2017  EF 60-65%.          Post Infusion Assessment:  Patient tolerated infusion without incident.  Adriamycin infused into free flowing NS line. Positive blood return noted every 2 mL. PIV site without redness, swelling, or irritation.  Vincristine infused into free flowing NS line. Positive blood return noted per protocol. PIV site without redness, swelling, or irritation.  Blood return noted before or after cyclophosphamide and rituximab.  Site patent and intact, free from redness, edema or discomfort.  No evidence of extravasations.   Access discontinued per protocol.    Discharge Plan:   Prescription refills given for prednisone and gabapentin.  Discharge instructions reviewed with: Patient.  Patient and/or family verbalized understanding of discharge instructions and all questions answered.  Copy of AVS reviewed with patient and/or family.  Patient will return 12/5/2017 for next appointment.  Patient discharged in stable condition accompanied by: .  Departure Mode: Ambulatory with walker.    Niranjan Kaba RN

## 2017-11-29 NOTE — NURSING NOTE
Chief Complaint   Patient presents with     Blood Draw     Labs drawn via  by RN. VS taken.     Lluvia Haq RN

## 2017-11-29 NOTE — PROGRESS NOTES
Heme/onc visit note  Nov 29, 2017    Reason for visit: follow up DLBCL    Cancer history: Amelia Michel is a 57 year old female with DLBCL. She had a complicated diagnosis as below:    1. History of Rheumatoid Arthritis status post long term treatment with methotrexate with recent significant complicated course with cardiac arrest, admission with hypotension, sepsis, ICU stay and intubation with subsequent additional readmission from TCU in 6/2017 for FUO with extensive work-up with eventual finding of progression cytopenias with eventual lymphoma diagnosis  2. Bone Marrow Biopsy: 7/12/2017: Highly suspicious for involvement by B cell lymphoma with foci of large atypical CD20+ cells  3. PET CT 7/19/2017: Extensive activity: Right paratracheal lesion with SUV 28, many liver lesions with SUV 15, cardiac lesion intraarterial septum, numerous bone lesions.  4. 7/21 Liver Biopsy: Consistent with Diffuse Large B Cell Lymphoma non-germinal center phenotype  -- FISH for myc, bcl2, bcl6 negative for double-hit lymphoma  5. IPI based on Age < 60 (0 points) + PS 2 (1 + point) + Stage IV Disease (1 point) + Extranodal disease > 1 site (1 point) + elevated LDH (1 point) = 4 Poor Risk Disease  6. Cycle 1 given as R-EPOCH Day 1 = 7/27/2017  -- complications of transfusion dependence and need for acute rehab placement (likely more result of extensive hospital stays)  - LP negative for CNS involvement 8/23/2017  7. Cycle #2: Transition to R-CHOP Day 1 = 8/22/2017  - Day 15 high dose MTX for CNS prophylaxis  - complicated by significant fluid overload and PICC associated DVT  8. Cycle #3 Day 1 = 9/20/2017  9. PET/CT 10/9 shows a complete remission by Lugano criteria.   10. Cycle #4 Day 1 10/19/17, day 20 IT MTX 11/7/17  11. Cycle #5 Day 1 11/8/17  12. Cycle #6 Day 1 11/29/17    Interval history:  Patient reports that she has intermittent headaches that she feels are allergy related. She does take Zyrtec with relief. She feels she  is having less problems with this now that it has gotten colder outside. She does have blurred vision and plans to follow-up with her eye doctor. She has had 1 episode of vomiting and diarrhea yesterday morning. No one else in her family was sick and she is unknown sure why this happened. She is feeling much better today. She reports that her urinary symptoms are improved with the use of Detrol. She continues to have unchanged neuropathy in her left hand in the thumb, second and third digit and in her right leg. She does wear a right leg brace and reports that she did have a wound on her ankle but that has since resolved. She plans to follow up with orthotics for refitting once she completes chemotherapy. She reports that her energy remains low. She is sleeping fair which is a chronic issue for her. She reports that her mood is generally okay except for when she takes the high-dose steroids and becomes quite irritable. She reports that her Lovenox sites get a little weepy but otherwise denies any bleeding issues. She has had difficulty with word finding since starting chemotherapy. She denies other concerns.    Review of Systems:  Patient denies any of the following except if noted above: fevers, chills, hearing changes, chest pain, dyspnea, abdominal pain, nausea, constipation, or urinary concerns.    Past medical history:  Patient Active Problem List   Diagnosis     HTN (hypertension)     Primary pulmonary hypertension (H)     Hyperlipidemia LDL goal <130     Other rheumatoid arthritis with rheumatoid factor of multiple sites     Lymphedema of both lower extremities     Atrial tachycardia (H)     Cardiogenic shock (H)     Acute respiratory failure with hypoxia (H)     Hypertension     Critical illness myopathy     Sepsis (H)     Wound of left upper extremity     Diffuse large B-cell lymphoma of extranodal site (H)     Diffuse large B cell lymphoma (H)     Febrile neutropenia (H)     Physical deconditioning      Health Care Home     DLBCL (diffuse large B cell lymphoma) (H)     H/O cardiac arrest       Medications:    Current Outpatient Prescriptions on File Prior to Visit:  levofloxacin (LEVAQUIN) 250 MG tablet Take 1 tablet (250 mg) by mouth daily   digoxin (LANOXIN) 125 MCG tablet Take 1 tablet (125 mcg) by mouth daily   enoxaparin (LOVENOX) 40 MG/0.4ML injection Inject 0.4 mLs (40 mg) Subcutaneous daily   potassium chloride SA (K-DUR/KLOR-CON M) 10 MEQ CR tablet Take 1 tablet (10 mEq) by mouth daily   potassium & sodium phosphates (NEUTRA-PHOS) 280-160-250 MG Packet Take 1 packet by mouth 2 times daily   tolterodine (DETROL LA) 4 MG 24 hr capsule Take 1 capsule (4 mg) by mouth daily   acyclovir (ZOVIRAX) 400 MG tablet Take 1 tablet (400 mg) by mouth 2 times daily   atenolol (TENORMIN) 25 MG tablet Take 1 tablet (25 mg) by mouth 2 times daily   fluconazole (DIFLUCAN) 100 MG tablet Take 1 tablet (100 mg) by mouth daily   DiphenhydrAMINE HCl (BENADRYL ALLERGY PO) Take 50 mg by mouth   HYDROCORTISONE PO Take 100 mg by mouth IV   magic mouthwash suspension (diphenhydrAMINE, lidocaine, aluminum-magnesium & simethicone) Swish and swallow 10 mLs in mouth 2 times daily   predniSONE (DELTASONE) 5 MG tablet Take 1 tablet (5 mg) by mouth daily   traMADol (ULTRAM) 50 MG tablet Take 1 tablet (50 mg) by mouth every 6 hours as needed for moderate pain   acetaminophen (TYLENOL) 325 MG tablet Take 2 tablets (650 mg) by mouth every 4 hours as needed for mild pain, fever or headaches   ondansetron (ZOFRAN-ODT) 8 MG ODT tab Take 1 tablet (8 mg) by mouth every 8 hours as needed   calcium polycarbophil (FIBERCON) 625 MG tablet Take 1 tablet by mouth 2 times daily   calcium-vitamin D (CALTRATE) 600-400 MG-UNIT per tablet Take 2 tablets by mouth every morning   multivitamin, therapeutic with minerals (THERA-VIT-M) TABS tablet Take 1 tablet by mouth daily   ascorbic acid 500 MG TABS Take 1 tablet (500 mg) by mouth daily   predniSONE (DELTASONE)  "50 MG tablet Take 1 tablet (50 mg) by mouth 2 times daily for 5 days Take on Days 1 through 5. Take first dose in AM prior to chemotherapy.   cetirizine (ZYRTEC) 10 MG tablet Take 1 tablet (10 mg) by mouth daily (Patient not taking: Reported on 11/29/2017)   [DISCONTINUED] gabapentin (NEURONTIN) 100 MG capsule Take 2 capsules (200 mg) by mouth 3 times daily     Current Facility-Administered Medications on File Prior to Visit:  [COMPLETED] 0.9% sodium chloride BOLUS   fosaprepitant (EMEND) 150 mg in NaCl 0.9 % intermittent infusion   palonosetron (ALOXI) injection 0.25 mg   acetaminophen (TYLENOL) tablet 650 mg   diphenhydrAMINE (BENADRYL) capsule 50 mg   riTUXimab (RITUXAN) 560 mg in NaCl 0.9 % 560 mL   DOXOrubicin (ADRIAMYCIN) 75 mg in 37.5 mL CHEMOTHERAPY   vinCRIStine (ONCOVIN) 2 mg in NaCl 0.9 % 30 mL CHEMOTHERAPY   cyclophosphamide (CYTOXAN) 1,125 mg in NaCl 0.9 % 331 mL CHEMOTHERAPY   pegfilgrastim (NEULASTA Onpro Kit) On-body injector 6 mg       Allergy:     Allergies   Allergen Reactions     Blood Transfusion Related (Informational Only) Hives     Hives with platelets. Give benadryl premedication.     Metoprolol Other (See Comments)     Pt and  report that metoprolol does not work for her and also reports feeling unwell with this medication. She has been able to tolerate atenolol, which as worked in controlling her HR.      No Clinical Screening - See Comments      Penicillin Allergy Skin Test not performed, see antimicrobial management team progress note 7/5/17.       Penicillins      Tape [Adhesive Tape] Rash       Physical exam  General: The patient is a pleasant female in no acute distress. She walks with a walker. Is able to get on and off the exam table unassisted.  /69 (BP Location: Right arm, Patient Position: Sitting, Cuff Size: Adult Regular)  Pulse 69  Temp 97.8  F (36.6  C) (Oral)  Resp 18  Ht 1.473 m (4' 9.99\")  Wt 55.1 kg (121 lb 6.4 oz)  SpO2 99%  BMI 25.38 kg/m2  Wt " Readings from Last 10 Encounters:   11/29/17 55.1 kg (121 lb 6.4 oz)   11/20/17 53.8 kg (118 lb 9.6 oz)   11/16/17 54.3 kg (119 lb 9.6 oz)   11/07/17 54.5 kg (120 lb 1.6 oz)   10/30/17 52.8 kg (116 lb 6.4 oz)   10/19/17 56.2 kg (123 lb 12.8 oz)   10/11/17 56 kg (123 lb 6.4 oz)   10/04/17 56.1 kg (123 lb 9.6 oz)   09/29/17 56.6 kg (124 lb 11.2 oz)   09/27/17 54.4 kg (120 lb)   HEENT: EOMI, PERRL. Sclerae are anicteric. Oral mucosa is pink and moist with no lesions or thrush.   Lymph: Neck is supple with no lymphadenopathy in the cervical or supraclavicular areas.   Heart: Regular rate and rhythm with 3/6 systolic murmur.   Lungs: Clear to auscultation bilaterally.   Abdomen: Bowel sounds present, soft, nontender with no palpable hepatosplenomegaly or masses.   Extremities: No lower extremity edema noted bilaterally, compression stockings in place.   Neuro: Cranial nerves II through XII are grossly intact.  Skin: Left arm with significant scarring over previous IV site. 1-2 cm open area with firm, white substance and blue suture.    Labs:   11/29/2017 07:28   Sodium 141   Potassium 3.9   Chloride 106   Carbon Dioxide 27   Urea Nitrogen 14   Creatinine 0.57   GFR Estimate >90   GFR Estimate If Black >90   Calcium 9.7   Anion Gap 7   Albumin 3.4   Protein Total 6.3 (L)   Bilirubin Total 0.3   Alkaline Phosphatase 96   ALT 52 (H)   AST 50 (H)   Glucose 115 (H)   WBC 6.3   Hemoglobin 11.6 (L)   Hematocrit 34.2 (L)   Platelet Count 133 (L)   RBC Count 2.91 (L)    (H)   MCH 39.9 (H)   MCHC 33.9   RDW 14.4   Diff Method Manual Differential   % Neutrophils 76.2   % Lymphocytes 8.8   % Monocytes 11.4   % Eosinophils 0.9   % Basophils 0.9   % Metamyelocytes 1.8   Absolute Neutrophil 4.8   Absolute Lymphocytes 0.6 (L)   Absolute Monocytes 0.7   Absolute Eosinophils 0.1   Absolute Basophils 0.1   Absolute Metamyelocytes 0.1 (H)   Macrocytes Present   Platelet Estimate Confirming automa...     Assessment and  plan:  Lymphoma: Got cycle #1 in the hospital and R-EPOCH chosen for infusional nature due to possible intracardiac involvement and also extensive disease to allow for slower lymphoma kill. Tolerated this well, aside from significant cytopenias (which were present at diagnosis). Then transitioned to R-CHOP to finish out a total of 6 cycles of chemotherapy (1 R-EPOCH and 5 R-CHOP) with initially planned for High Dose MTX on Day 15 of cycle 2,4, and 6 due to high IPI.   She had significant toxicity with the high dose methotrexate with PICC associated clot, significant volume overload, increased neuropathy and thus was changed to receive IT chemo as the prophylaxis given the risk/benefit ratio.  -She has been tolerating R-CHOP fairly well. She will continue with cycle 6 R-CHOP today.   -She will return on 12/18 for IT cytarabine.   -She will have a restaging PET/CT 7 weeks after completing her chemotherapy and will f/u with Dr. Rodriguez to review.    ID: Remains on acyclovir and fluconazole. Resume Levaquin when ANC<1.     Rheumatoid arthritis: is on prednisone 5 mg daily. Will continue this in between her chemo sessions.      CV: A. Fib. On digoxin and atenolol. Now on lovenox for DVT so anticoagulated. Will need to start aspirin if the lovenox is stopped.      PICC Associated RUE DVT: she was on therapetic lovenox and US 10/16 showed the clot cleared. She wishes to take ppx dose lovenox. Will hold this before procedures or if plt<50K.     Neuropathy:  Had it before chemo. stable continue with Neurontin. She is discharged from PT.     Chronic left arm wound: related to IV access in the past. She will continue with daily dressing changes. Will send her to see wound care nurse. Suspect needs debridement and removal of old suture.     Deconditioning and word finding difficulty: Will refer to cancer rehab PT/OT for both issues.     Heme: Patient had more significant cytopenias with this last cycle. Will plan to check labs  twice weekly with possible platelet transfusions during her morales. Hold Lovenox when platelets < 50,000. Transfuse if platelets <10, hgb <8.    Kelly Mcginnis PA-C  [facility]  [address] [place] [address] [phone]

## 2017-11-29 NOTE — MR AVS SNAPSHOT
After Visit Summary   11/29/2017    Amelia Michel    MRN: [id number]           Patient Information     Date Of Birth          1960        Visit Information        Provider Department      11/29/2017 8:30 AM UC 20 ATC; UC ONCOLOGY INFUSION [facility]        Today's Diagnoses     Diffuse large B-cell lymphoma of extranodal site (H)    -  1      Care Instructions    Contact Numbers    [facility] Main Line: [phone]  [facility] Triage:  [phone]    Call triage with chills and/or temperature greater than or equal to 100.5, uncontrolled nausea/vomiting, diarrhea, constipation, dizziness, shortness of breath, chest pain, bleeding, unexplained bruising, or any new/concerning symptoms, questions/concerns.     If you are having any concerning symptoms or wish to speak to a provider before your next infusion visit, please call your care coordinator or triage to notify them so we can adequately serve you.       After Hours: [phone]    If after hours, weekends, or holidays, call main hospital  and ask for Oncology doctor on call.           November 2017 Sunday Monday Tuesday Wednesday Thursday Friday Saturday                  1     2     3     4       5     6     7     [facility] LAB DRAW    6:30 AM   (15 min.)   [facility] LAB DRAW   [facility] Lab Draw     [facility] RETURN    6:45 AM   (50 min.)   Kelly Mcginnis PA-C [facility]     Admission    8:24 AM   Victor Manuel Blair MD   [address] [facility]   (Discharge: 11/7/2017)     PROCEDURE - 3 HR    9:30 AM   (180 min.)   U2A ROOM 10   [address] [facility]     XR LP INTRATHECAL CHEMO ADMIN   11:00 AM   (60 min.)   [id number]   [facility] [place],  Radiology     [facility] LUMBAR PUNCTURE   11:25 AM   (100 min.)   Kalpana Alvarado PA-C [facility] 8     [facility] ONC INFUSION 360    8:00 AM   (360 min.)   UC ONCOLOGY INFUSION   [facility] 9     10     11       12      13     14     15     16     [facility] DRAW    3:00 PM   (15 min.)    [facility] LAB DRAW   [facility] Lab Draw     [facility] ONC INFUSION 120    3:30 PM   (120 min.)    ONCOLOGY INFUSION   [facility] 17     18       19     20     [facility] LAB DRAW   11:30 AM   (15 min.)    [facility] LAB DRAW   [facility] Lab Draw     [facility] ONC INFUSION 120   12:00 PM   (120 min.)    ONCOLOGY INFUSION   [facility] 21     22     23     24     25  Happy Birthday!       26     27     28     29     [facility] LAB DRAW    7:15 AM   (15 min.)    [facility] LAB DRAW   [facility] Lab Draw     [facility] RETURN    7:35 AM   (50 min.)   Kelly Mcginnis PA-C   [facility]     [facility] ONC INFUSION 360    8:30 AM   (360 min.)    ONCOLOGY INFUSION   [facility] 30 December 2017 Sunday Monday Tuesday Wednesday Thursday Friday Saturday                            1     2       3     4     5     [facility] LAB DRAW   11:00 AM   (15 min.)    [facility] LAB DRAW   [facility] Lab Draw     [facility] ONC INFUSION 120   11:30 AM   (120 min.)    ONCOLOGY INFUSION   [facility] 6     7     8     [facility] LAB DRAW    9:30 AM   (15 min.)    [facility] LAB DRAW   [facility] Lab Draw     [facility] ONC INFUSION 120   10:00 AM   (120 min.)    ONCOLOGY INFUSION   [facility] 9       10     11     12     [facility] LAB DRAW    8:45 AM   (15 min.)    [facility] LAB DRAW   [facility] Lab Draw     [facility] ONC INFUSION 120    9:30 AM   (120 min.)    ONCOLOGY INFUSION   [facility] 13     14     15     16       17     18     [facility] LUMBAR PUNCTURE    8:25 AM   (100 min.)   Kelly Mcginnis PA-C   [facility]     XR LP INTRATHECAL CHEMO ADMIN    9:00 AM   (60 min.)   [id number]   [facility] [place],  Radiology 19     20     21     22     23       24     25     26     27      28     29     30       31                                                Lab Results:  Recent Results (from the past 12 hour(s))   CBC with platelets differential    Collection Time: 11/29/17  7:28 AM   Result Value Ref Range    WBC 6.3 4.0 - 11.0 10e9/L    RBC Count 2.91 (L) 3.8 - 5.2 10e12/L    Hemoglobin 11.6 (L) 11.7 - 15.7 g/dL    Hematocrit 34.2 (L) 35.0 - 47.0 %     (H) 78 - 100 fl    MCH 39.9 (H) 26.5 - 33.0 pg    MCHC 33.9 31.5 - 36.5 g/dL    RDW 14.4 10.0 - 15.0 %    Platelet Count 133 (L) 150 - 450 10e9/L    Diff Method Manual Differential     % Neutrophils 76.2 %    % Lymphocytes 8.8 %    % Monocytes 11.4 %    % Eosinophils 0.9 %    % Basophils 0.9 %    % Metamyelocytes 1.8 %    Absolute Neutrophil 4.8 1.6 - 8.3 10e9/L    Absolute Lymphocytes 0.6 (L) 0.8 - 5.3 10e9/L    Absolute Monocytes 0.7 0.0 - 1.3 10e9/L    Absolute Eosinophils 0.1 0.0 - 0.7 10e9/L    Absolute Basophils 0.1 0.0 - 0.2 10e9/L    Absolute Metamyelocytes 0.1 (H) 0 10e9/L    Macrocytes Present     Platelet Estimate Confirming automated cell count    Comprehensive metabolic panel    Collection Time: 11/29/17  7:28 AM   Result Value Ref Range    Sodium 141 133 - 144 mmol/L    Potassium 3.9 3.4 - 5.3 mmol/L    Chloride 106 94 - 109 mmol/L    Carbon Dioxide 27 20 - 32 mmol/L    Anion Gap 7 3 - 14 mmol/L    Glucose 115 (H) 70 - 99 mg/dL    Urea Nitrogen 14 7 - 30 mg/dL    Creatinine 0.57 0.52 - 1.04 mg/dL    GFR Estimate >90 >60 mL/min/1.7m2    GFR Estimate If Black >90 >60 mL/min/1.7m2    Calcium 9.7 8.5 - 10.1 mg/dL    Bilirubin Total 0.3 0.2 - 1.3 mg/dL    Albumin 3.4 3.4 - 5.0 g/dL    Protein Total 6.3 (L) 6.8 - 8.8 g/dL    Alkaline Phosphatase 96 40 - 150 U/L    ALT 52 (H) 0 - 50 U/L    AST 50 (H) 0 - 45 U/L               Follow-ups after your visit        Your next 10 appointments already scheduled     Dec 05, 2017 11:00 AM [facility]   [facility] Lab Draw with  M[facility]IC LAB DRAW   [facility] Lab Draw ([facility])    [address] [place] [address]   [phone]            Dec 05, 2017 11:30 AM CST   Infusion 120 with UC ONCOLOGY INFUSION, UC 32 ATC   [facility] ([facility])    [address] [place] [address]   [phone]            Dec 08, 2017  9:30 AM CST   Masonic Lab Draw with UC [facility] LAB DRAW   [facility] ([facility])    [address] [place] [address]   [phone]            Dec 08, 2017 10:00 AM CST   Infusion 120 with UC ONCOLOGY INFUSION, UC 29 ATC   [facility] ([facility])    [address] [place] [address]   [phone]            Dec 12, 2017  8:45 AM CST   Masonic Lab Draw with UC [facility] LAB DRAW   [facility] ([facility])    [address] [place] [address]   [phone]            Dec 12, 2017  9:30 AM CST   Infusion 120 with UC ONCOLOGY INFUSION, UC 19 ATC   [facility] ([facility])    [address] [place] [address]   [phone]            Dec 18, 2017  8:40 AM CST   (Arrive by 8:25 AM)   Lumbar Puncture with Kelly Mcginnis PA-C   [facility] ([facility])    [address] [place] [address]   [phone]              Who to contact     If you have questions or need follow up information about today's clinic visit or your schedule please contact [facility] directly at [phone].  Normal or non-critical lab and imaging results will be communicated to you by MyChart, letter or phone within 4 business days after the clinic has received the results. If you do not hear from us within 7 days, please contact the clinic through  [employer] or phone. If you have a critical or abnormal lab result, we will notify you by phone as soon as possible.  Submit refill requests through [employer] or call your pharmacy and they will forward the refill request to us. Please allow 3 business days for your refill to be completed.          Additional Information About Your Visit        [employer] [facility] [employer] Information     [employer] gives you secure access to your electronic health record. If you see a primary care provider, you can also send messages to your care team and make appointments. If you have questions, please call your primary care clinic.  If you do not have a primary care provider, please call [phone] and they will assist you.        Care EveryWhere ID     This is your Care EveryWhere ID. This could be used by other organizations to access your [facility] medical records  [id number]         Blood Pressure from Last 3 Encounters:   11/29/17 124/69   11/20/17 123/69   11/16/17 150/84    Weight from Last 3 Encounters:   11/29/17 55.1 kg (121 lb 6.4 oz)   11/20/17 53.8 kg (118 lb 9.6 oz)   11/16/17 54.3 kg (119 lb 9.6 oz)              We Performed the Following     CBC with platelets differential     Comprehensive metabolic panel          Today's Medication Changes          These changes are accurate as of: 11/29/17 11:23 AM.  If you have any questions, ask your nurse or doctor.               These medicines have changed or have updated prescriptions.        Dose/Directions    * potassium chloride 10 MEQ tablet   Commonly known as:  K-TAB,KLOR-CON   This may have changed:  Another medication with the same name was added. Make sure you understand how and when to take each.   Changed by:  Kelly Mcginnis PA-C        Refills:  0       * potassium chloride 20 MEQ Packet   Commonly known as:  KLOR-CON   This may have changed:  You were already taking a medication with the same name, and this prescription was added. Make sure you understand how and when to take  each.   Used for:  Hypokalemia   Changed by:  Kelly Mcginnis PA-C        Dose:  10 mEq   Take 10 mEq by mouth daily   Quantity:  15 tablet   Refills:  3       * predniSONE 5 MG tablet   Commonly known as:  DELTASONE   This may have changed:  Another medication with the same name was added. Make sure you understand how and when to take each.   Used for:  Diffuse large B-cell lymphoma, unspecified body region (H)   Changed by:  Lenore Rodriguez MD        Dose:  5 mg   Take 1 tablet (5 mg) by mouth daily   Quantity:  30 tablet   Refills:  0       * predniSONE 50 MG tablet   Commonly known as:  DELTASONE   This may have changed:  You were already taking a medication with the same name, and this prescription was added. Make sure you understand how and when to take each.   Used for:  Diffuse large B-cell lymphoma of extranodal site (H)        Dose:  50 mg   Take 1 tablet (50 mg) by mouth 2 times daily for 5 days Take on Days 1 through 5. Take first dose in AM prior to chemotherapy.   Quantity:  10 tablet   Refills:  0       * Notice:  This list has 4 medication(s) that are the same as other medications prescribed for you. Read the directions carefully, and ask your doctor or other care provider to review them with you.         Where to get your medicines      These medications were sent to [address] [address]    Hours:  TRANSPLANT PHONE NUMBER [phone] Phone:  [phone]     gabapentin 100 MG capsule    potassium chloride 20 MEQ Packet    predniSONE 50 MG tablet                Primary Care Provider Office Phone # Fax #    Comfort Sabillon [phone] [address]  [facility] [address]        Equal Access to Services     SARAH HEAD AH: Kay Flores, waerasto luqadaha, qaybta kaalmaalana bae, durga inman. So [facility]  [phone].    ATENCIÓN: Si marlee gutierrez, tiene a sarmiento disposición servicios gratuitos de asistencia lingüística. Taylor hooker [phone].    We comply with applicable federal civil rights laws and [place] laws. We do not discriminate on the basis of race, color, national origin, age, disability, sex, sexual orientation, or gender identity.            Thank you!     Thank you for choosing [facility]  for your care. Our goal is always to provide you with excellent care. Hearing back from our patients is one way we can continue to improve our services. Please take a few minutes to complete the written survey that you may receive in the mail after your visit with us. Thank you!             Your Updated Medication List - Protect others around you: Learn how to safely use, store and throw away your medicines at www.disposemymeds.org.          This list is accurate as of: 11/29/17 11:23 AM.  Always use your most recent med list.                   Brand Name Dispense Instructions for use Diagnosis    acetaminophen 325 MG tablet    TYLENOL     Take 2 tablets (650 mg) by mouth every 4 hours as needed for mild pain, fever or headaches    Diffuse large B-cell lymphoma of extranodal site (H)       acyclovir 400 MG tablet    ZOVIRAX    60 tablet    Take 1 tablet (400 mg) by mouth 2 times daily    Diffuse large B-cell lymphoma, unspecified body region (H)       ascorbic acid 500 MG Tabs     30 tablet    Take 1 tablet (500 mg) by mouth daily    Diffuse large B-cell lymphoma, unspecified body region (H)       atenolol 25 MG tablet    TENORMIN    60 tablet    Take 1 tablet (25 mg) by mouth 2 times daily    Atrial tachycardia (H)       BENADRYL ALLERGY PO      Take 50 mg by mouth        calcium polycarbophil 625 MG tablet    FIBERCON     Take 1 tablet by mouth 2 times daily        calcium-vitamin D 600-400 MG-UNIT per tablet    CALTRATE    60 tablet    Take 2 tablets by mouth every morning    Diffuse large B-cell  lymphoma, unspecified body region (H)       cetirizine 10 MG tablet    zyrTEC    30 tablet    Take 1 tablet (10 mg) by mouth daily    Hypophosphatemia       digoxin 125 MCG tablet    LANOXIN    30 tablet    Take 1 tablet (125 mcg) by mouth daily    Atrial tachycardia (H)       enoxaparin 40 MG/0.4ML injection    LOVENOX    30 Syringe    Inject 0.4 mLs (40 mg) Subcutaneous daily    VTE (venous thromboembolism), Paroxysmal atrial fibrillation (H)       fluconazole 100 MG tablet    DIFLUCAN    30 tablet    Take 1 tablet (100 mg) by mouth daily    Diffuse large B-cell lymphoma, unspecified body region (H)       gabapentin 100 MG capsule    NEURONTIN    180 capsule    Take 2 capsules (200 mg) by mouth 3 times daily    Critical illness myopathy       HYDROCORTISONE PO      Take 100 mg by mouth IV        levofloxacin 250 MG tablet    LEVAQUIN    30 tablet    Take 1 tablet (250 mg) by mouth daily    Chemotherapy-induced neutropenia (H)       lidocaine visc 2% 2.5mL/5mL & maalox/mylanta w/ simeth 2.5mL/5mL & diphenhydrAMINE 5mg/5mL Susp suspension    [facility]     Swish and swallow 10 mLs in mouth 2 times daily        multivitamin, therapeutic with minerals Tabs tablet     30 each    Take 1 tablet by mouth daily    Diffuse large B-cell lymphoma, unspecified body region (H)       ondansetron 8 MG ODT tab    ZOFRAN-ODT    20 tablet    Take 1 tablet (8 mg) by mouth every 8 hours as needed    Diffuse large B-cell lymphoma of extranodal site (H)       PHOSPHA 250 NEUTRAL 250 MG per tablet   Generic drug:  phosphorus tablet 250 mg           potassium & sodium phosphates 280-160-250 MG Packet    NEUTRA-PHOS    60 packet    Take 1 packet by mouth 2 times daily    Hypophosphatemia       * potassium chloride 10 MEQ tablet    K-TAB,KLOR-CON          * potassium chloride 20 MEQ Packet    KLOR-CON    15 tablet    Take 10 mEq by mouth daily    Hypokalemia       potassium chloride SA 10 MEQ CR tablet    K-DUR/KLOR-CON M     30 tablet    Take 1 tablet (10 mEq) by mouth daily    Paroxysmal atrial fibrillation (H)       * predniSONE 5 MG tablet    DELTASONE    30 tablet    Take 1 tablet (5 mg) by mouth daily    Diffuse large B-cell lymphoma, unspecified body region (H)       * predniSONE 50 MG tablet    DELTASONE    10 tablet    Take 1 tablet (50 mg) by mouth 2 times daily for 5 days Take on Days 1 through 5. Take first dose in AM prior to chemotherapy.    Diffuse large B-cell lymphoma of extranodal site (H)       tolterodine 4 MG 24 hr capsule    DETROL LA    30 capsule    Take 1 capsule (4 mg) by mouth daily    Urinary urgency       traMADol 50 MG tablet    ULTRAM    30 tablet    Take 1 tablet (50 mg) by mouth every 6 hours as needed for moderate pain    Diffuse large B-cell lymphoma of extranodal site (H)       * Notice:  This list has 4 medication(s) that are the same as other medications prescribed for you. Read the directions carefully, and ask your doctor or other care provider to review them with you.

## 2017-11-29 NOTE — MR AVS SNAPSHOT
"              After Visit Summary   11/29/2017    Amelia Michel    MRN: [id number]           Patient Information     Date Of Birth          1960        Visit Information        Provider Department      11/29/2017 7:50 AM Kelly Mcginnis PA-C [facility]        Today's Diagnoses     Diffuse large B-cell lymphoma of extranodal site (H)    -  1    Critical illness myopathy        Physical deconditioning        Word finding difficulty        Open wound        Paroxysmal atrial fibrillation (H)        Hypokalemia           Follow-ups after your visit        Additional Services     PHYSICAL THERAPY REFERRAL       *This therapy referral will be filtered to a centralized scheduling office at [facility] and the patient will receive a call to schedule an appointment at a [facility] location most convenient for them. *     [facility] provides Physical Therapy evaluation and treatment and many specialty services across the [facility] system.  If requesting a specialty program, please choose from the list below.    If you have not heard from the scheduling office within 2 business days, please call [phone] for all locations, with the exception of [place], please call [phone].  Treatment: Evaluation & Treatment  Special Instructions/Modalities: cancer rehab for deconditioning and word finding difficulty following chemotherapy  Special Programs: Cancer Rehabilitation (PT and OT Evaluate & Treat)    Please be aware that coverage of these services is subject to the terms and limitations of your health insurance plan.  Call member services at your health plan with any benefit or coverage questions.      **Note to Provider:  If you are referring outside of [facility] for the therapy appointment, please list the name of the location in the \"special instructions\" above, print the referral and give to the patient to schedule the appointment.            WOUND " CARE REFERRAL       Your provider has referred you to: PREFERRED PROVIDERS:    Reason for referral: Wound care      1. [facility] Wound Consult appointment is related to what kind of wound: open wound on left arm after IV placement    2. Location of wound: left arm    3. Reason for referral: Assess and treat as indicated    4. Desired treatment if any: Per [facility] nurse     Please be aware that coverage of these services is subject to the terms and limitations of your health insurance plan.  Call member services at your health plan with any benefit or coverage questions.      Please bring the following with you to your appointment:    (1) Any X-Rays, CTs or MRIs which have been performed.  Contact the facility where they were done to arrange for  prior to your scheduled appointment.    (2) List of current medications   (3) This referral request   (4) Any documents/labs given to you for this referral                  Your next 10 appointments already scheduled     Dec 05, 2017 11:00 AM CST   Masonic Lab Draw with UC MASONIC LAB DRAW   [facility] Mason[facility] Lab Draw ([facility])    [address] [place] [id number]   [phone]            Dec 05, 2017 11:30 AM CST   Infusion 120 with UC ONCOLOGY INFUSION, UC 32 ATC   [facility])    [address] [place] [address]   [phone]            Dec 08, 2017  9:30 AM CST   Masonic Lab Draw with UC MASONIC LAB DRAW   [facility] Mason[facility] Lab Draw ([facility])    [address] [place] [address]   [phone]            Dec 08, 2017 10:00 AM CST   Infusion 120 with UC ONCOLOGY INFUSION, UC 29 ATC   [facility])    [address] [place] [address]   [phone]            Dec 12, 2017  8:45 AM CST   M[facility]ic Lab Draw with UC  [facility] LAB DRAW   [facility] Lab Draw ([facility])    [address]  2nd Floor  [facility] [address] [phone]            Dec 12, 2017  9:30 AM CST   Infusion 120 with UC ONCOLOGY INFUSION, UC 19 ATC   [facility] ([facility])    [address]  2nd [facility] [id number] [phone]            Dec 18, 2017  8:40 AM CST   (Arrive by 8:25 AM)   Lumbar Puncture with Kelly Mcginnis PA-C   [facility] ([facility])    [address]  2nd [facility] [id number] [phone]              Future tests that were ordered for you today     Open Standing Orders        Priority Remaining Interval Expires Ordered    *CBC with platelets differential Routine 6/6 every visit 11/29/2018 11/29/2017          Open Future Orders        Priority Expected Expires Ordered    Comprehensive metabolic panel Routine 1/18/2018 2/28/2018 11/29/2017    Magnesium Routine 1/18/2018 2/28/2018 11/29/2017    CBC with platelets differential Routine 1/18/2018 2/28/2018 11/29/2017    Phosphorus Routine 1/18/2018 2/28/2018 11/29/2017    Lactate Dehydrogenase Routine 1/18/2018 2/28/2018 11/29/2017    Uric acid Routine 1/18/2018 2/28/2018 11/29/2017    PET Oncology Whole Body Routine 1/16/2018 11/29/2018 11/29/2017            Who to contact     If you have questions or need follow up information about today's clinic visit or your schedule please contact [facility] directly at [phone].  Normal or non-critical lab and imaging results will be communicated to you by MyChart, letter or phone within 4 business days after the clinic has received the results. If you do not hear from us within 7 days, please contact the clinic through MyChart or phone. If you have a critical or abnormal lab result, we will notify you by phone as soon as possible.  Submit refill  "requests through [employer] or call your pharmacy and they will forward the refill request to us. Please allow 3 business days for your refill to be completed.          Additional Information About Your Visit        [email or web address] Information     [employer] gives you secure access to your electronic health record. If you see a primary care provider, you can also send messages to your care team and make appointments. If you have questions, please call your primary care clinic.  If you do not have a primary care provider, please call [phone] and they will assist you.        Care EveryWhere ID     This is your Care EveryWhere ID. This could be used by other organizations to access your [facility] medical records  [id number]        Your Vitals Were     Pulse Temperature Respirations Height Pulse Oximetry BMI (Body Mass Index)    69 97.8  F (36.6  C) (Oral) 18 1.473 m (4' 9.99\") 99% 25.38 kg/m2       Blood Pressure from Last 3 Encounters:   11/29/17 124/69   11/20/17 123/69   11/16/17 150/84    Weight from Last 3 Encounters:   11/29/17 55.1 kg (121 lb 6.4 oz)   11/20/17 53.8 kg (118 lb 9.6 oz)   11/16/17 54.3 kg (119 lb 9.6 oz)              We Performed the Following     PHYSICAL THERAPY REFERRAL     WOUND CARE REFERRAL          Today's Medication Changes          These changes are accurate as of: 11/29/17 12:50 PM.  If you have any questions, ask your nurse or doctor.               Start taking these medicines.        Dose/Directions    potassium chloride 20 MEQ Packet   Commonly known as:  KLOR-CON   Used for:  Hypokalemia   Replaces:  potassium chloride 10 MEQ tablet   Started by:  Kelly Mcginnis PA-C        Dose:  10 mEq   Take 10 mEq by mouth daily   Quantity:  15 tablet   Refills:  3         These medicines have changed or have updated prescriptions.        Dose/Directions    * predniSONE 5 MG tablet   Commonly known as:  DELTASONE   This may have changed:  Another medication with the same name was added. Make sure " you understand how and when to take each.   Used for:  Diffuse large B-cell lymphoma, unspecified body region (H)   Changed by:  Lenore Rodriguez MD        Dose:  5 mg   Take 1 tablet (5 mg) by mouth daily   Quantity:  30 tablet   Refills:  0       * predniSONE 50 MG tablet   Commonly known as:  DELTASONE   This may have changed:  You were already taking a medication with the same name, and this prescription was added. Make sure you understand how and when to take each.   Used for:  Diffuse large B-cell lymphoma of extranodal site (H)        Dose:  50 mg   Take 1 tablet (50 mg) by mouth 2 times daily for 5 days Take on Days 1 through 5. Take first dose in AM prior to chemotherapy.   Quantity:  10 tablet   Refills:  0       * Notice:  This list has 2 medication(s) that are the same as other medications prescribed for you. Read the directions carefully, and ask your doctor or other care provider to review them with you.      Stop taking these medicines if you haven't already. Please contact your care team if you have questions.     PHOSPHA 250 NEUTRAL 250 MG per tablet   Generic drug:  phosphorus tablet 250 mg   Stopped by:  Kelly Mcginnis PA-C           potassium chloride 10 MEQ tablet   Commonly known as:  K-TAB,KLOR-CON   Replaced by:  potassium chloride 20 MEQ Packet   Stopped by:  Kelly Mcginnis PA-C           potassium chloride SA 10 MEQ CR tablet   Commonly known as:  K-DUR/KLOR-CON M   Stopped by:  Kelly Mcginnis PA-C                Where to get your medicines      These medications were sent to [address] [address]    Hours:  TRANSPLANT PHONE NUMBER [phone] Phone:  [phone]     gabapentin 100 MG capsule    potassium chloride 20 MEQ Packet    predniSONE 50 MG tablet                Primary Care Provider Office Phone # Fax #    Comfort Sabillon [phone]  [phone]       [address] [facility] [address]        Equal Access to Services     CATINA MENDEZBHAVESH : Hadii laurita mcguire hadcassia Sogilbert, waaxda luqadaha, qaybta kaalmada catalino, durga ednain hayaaall juárezja louise laGeraldnegin inman. So [facility] [phone].    ATENCIÓN: Si habla español, tiene a sarmiento disposición servicios gratuitos de asistencia lingüística. Taylor al [phone].    We comply with applicable federal civil rights laws and [place] laws. We do not discriminate on the basis of race, color, national origin, age, disability, sex, sexual orientation, or gender identity.            Thank you!     Thank you for choosing [facility]  for your care. Our goal is always to provide you with excellent care. Hearing back from our patients is one way we can continue to improve our services. Please take a few minutes to complete the written survey that you may receive in the mail after your visit with us. Thank you!             Your Updated Medication List - Protect others around you: Learn how to safely use, store and throw away your medicines at www.disposemymeds.org.          This list is accurate as of: 11/29/17 12:50 PM.  Always use your most recent med list.                   Brand Name Dispense Instructions for use Diagnosis    acetaminophen 325 MG tablet    TYLENOL     Take 2 tablets (650 mg) by mouth every 4 hours as needed for mild pain, fever or headaches    Diffuse large B-cell lymphoma of extranodal site (H)       acyclovir 400 MG tablet    ZOVIRAX    60 tablet    Take 1 tablet (400 mg) by mouth 2 times daily    Diffuse large B-cell lymphoma, unspecified body region (H)       ascorbic acid 500 MG Tabs     30 tablet    Take 1 tablet (500 mg) by mouth daily    Diffuse large B-cell lymphoma, unspecified body region (H)       atenolol 25 MG tablet    TENORMIN    60 tablet    Take 1 tablet (25 mg) by mouth 2 times daily    Atrial tachycardia (H)       BENADRYL ALLERGY PO      Take 50 mg by mouth         calcium polycarbophil 625 MG tablet    FIBERCON     Take 1 tablet by mouth 2 times daily        calcium-vitamin D 600-400 MG-UNIT per tablet    CALTRATE    60 tablet    Take 2 tablets by mouth every morning    Diffuse large B-cell lymphoma, unspecified body region (H)       cetirizine 10 MG tablet    zyrTEC    30 tablet    Take 1 tablet (10 mg) by mouth daily    Hypophosphatemia       digoxin 125 MCG tablet    LANOXIN    30 tablet    Take 1 tablet (125 mcg) by mouth daily    Atrial tachycardia (H)       enoxaparin 40 MG/0.4ML injection    LOVENOX    30 Syringe    Inject 0.4 mLs (40 mg) Subcutaneous daily    VTE (venous thromboembolism), Paroxysmal atrial fibrillation (H)       fluconazole 100 MG tablet    DIFLUCAN    30 tablet    Take 1 tablet (100 mg) by mouth daily    Diffuse large B-cell lymphoma, unspecified body region (H)       gabapentin 100 MG capsule    NEURONTIN    180 capsule    Take 2 capsules (200 mg) by mouth 3 times daily    Critical illness myopathy       HYDROCORTISONE PO      Take 100 mg by mouth IV        levofloxacin 250 MG tablet    LEVAQUIN    30 tablet    Take 1 tablet (250 mg) by mouth daily    Chemotherapy-induced neutropenia (H)       lidocaine visc 2% 2.5mL/5mL & maalox/mylanta w/ simeth 2.5mL/5mL & diphenhydrAMINE 5mg/5mL Susp suspension    [facility]     Swish and swallow 10 mLs in mouth 2 times daily        multivitamin, therapeutic with minerals Tabs tablet     30 each    Take 1 tablet by mouth daily    Diffuse large B-cell lymphoma, unspecified body region (H)       ondansetron 8 MG ODT tab    ZOFRAN-ODT    20 tablet    Take 1 tablet (8 mg) by mouth every 8 hours as needed    Diffuse large B-cell lymphoma of extranodal site (H)       potassium & sodium phosphates 280-160-250 MG Packet    NEUTRA-PHOS    60 packet    Take 1 packet by mouth 2 times daily    Hypophosphatemia       potassium chloride 20 MEQ Packet    KLOR-CON    15 tablet    Take 10 mEq by mouth daily     Hypokalemia       * predniSONE 5 MG tablet    DELTASONE    30 tablet    Take 1 tablet (5 mg) by mouth daily    Diffuse large B-cell lymphoma, unspecified body region (H)       * predniSONE 50 MG tablet    DELTASONE    10 tablet    Take 1 tablet (50 mg) by mouth 2 times daily for 5 days Take on Days 1 through 5. Take first dose in AM prior to chemotherapy.    Diffuse large B-cell lymphoma of extranodal site (H)       tolterodine 4 MG 24 hr capsule    DETROL LA    30 capsule    Take 1 capsule (4 mg) by mouth daily    Urinary urgency       traMADol 50 MG tablet    ULTRAM    30 tablet    Take 1 tablet (50 mg) by mouth every 6 hours as needed for moderate pain    Diffuse large B-cell lymphoma of extranodal site (H)       * Notice:  This list has 2 medication(s) that are the same as other medications prescribed for you. Read the directions carefully, and ask your doctor or other care provider to review them with you.

## 2017-12-04 RX ORDER — DIPHENHYDRAMINE HCL 25 MG
25 CAPSULE ORAL
Status: CANCELLED
Start: 2017-12-04

## 2017-12-04 RX ORDER — ACETAMINOPHEN 325 MG/1
325 TABLET ORAL ONCE
Status: CANCELLED
Start: 2017-12-04 | End: 2017-12-04

## 2017-12-05 ENCOUNTER — INFUSION THERAPY VISIT (OUTPATIENT)
Dept: ONCOLOGY | Facility: CLINIC | Age: 57
End: 2017-12-05
Attending: INTERNAL MEDICINE
Payer: COMMERCIAL

## 2017-12-05 ENCOUNTER — APPOINTMENT (OUTPATIENT)
Dept: LAB | Facility: CLINIC | Age: 57
End: 2017-12-05
Attending: INTERNAL MEDICINE
Payer: COMMERCIAL

## 2017-12-05 VITALS
BODY MASS INDEX: 26.28 KG/M2 | SYSTOLIC BLOOD PRESSURE: 135 MMHG | HEART RATE: 113 BPM | TEMPERATURE: 98 F | OXYGEN SATURATION: 98 % | WEIGHT: 125.7 LBS | DIASTOLIC BLOOD PRESSURE: 96 MMHG | RESPIRATION RATE: 18 BRPM

## 2017-12-05 DIAGNOSIS — C83.398 DIFFUSE LARGE B-CELL LYMPHOMA OF EXTRANODAL SITE: Primary | ICD-10-CM

## 2017-12-05 LAB
BASOPHILS # BLD AUTO: 0 10E9/L (ref 0–0.2)
BASOPHILS NFR BLD AUTO: 2.7 %
BLD PROD TYP BPU: NORMAL
BLD UNIT ID BPU: 0
BLOOD PRODUCT CODE: NORMAL
BPU ID: NORMAL
DACRYOCYTES BLD QL SMEAR: SLIGHT
DIFFERENTIAL METHOD BLD: ABNORMAL
EOSINOPHIL # BLD AUTO: 0 10E9/L (ref 0–0.7)
EOSINOPHIL NFR BLD AUTO: 2 %
ERYTHROCYTE [DISTWIDTH] IN BLOOD BY AUTOMATED COUNT: 13.8 % (ref 10–15)
HCT VFR BLD AUTO: 29.9 % (ref 35–47)
HGB BLD-MCNC: 10.1 G/DL (ref 11.7–15.7)
LYMPHOCYTES # BLD AUTO: 0.1 10E9/L (ref 0.8–5.3)
LYMPHOCYTES NFR BLD AUTO: 25.2 %
MACROCYTES BLD QL SMEAR: PRESENT
MCH RBC QN AUTO: 39.6 PG (ref 26.5–33)
MCHC RBC AUTO-ENTMCNC: 33.8 G/DL (ref 31.5–36.5)
MCV RBC AUTO: 117 FL (ref 78–100)
MONOCYTES # BLD AUTO: 0.1 10E9/L (ref 0–1.3)
MONOCYTES NFR BLD AUTO: 10.2 %
MYELOCYTES # BLD: 0 10E9/L
MYELOCYTES NFR BLD MANUAL: 1.4 %
NEUTROPHILS # BLD AUTO: 0.3 10E9/L (ref 1.6–8.3)
NEUTROPHILS NFR BLD AUTO: 58.5 %
NRBC # BLD AUTO: 0 10*3/UL
NRBC BLD AUTO-RTO: 1 /100
PLATELET # BLD AUTO: 25 10E9/L (ref 150–450)
POIKILOCYTOSIS BLD QL SMEAR: SLIGHT
RBC # BLD AUTO: 2.55 10E12/L (ref 3.8–5.2)
TRANSFUSION STATUS PATIENT QL: NORMAL
TRANSFUSION STATUS PATIENT QL: NORMAL
WBC # BLD AUTO: 0.5 10E9/L (ref 4–11)

## 2017-12-05 PROCEDURE — 96360 HYDRATION IV INFUSION INIT: CPT

## 2017-12-05 PROCEDURE — 86923 COMPATIBILITY TEST ELECTRIC: CPT | Performed by: INTERNAL MEDICINE

## 2017-12-05 PROCEDURE — 86850 RBC ANTIBODY SCREEN: CPT | Performed by: INTERNAL MEDICINE

## 2017-12-05 PROCEDURE — 85025 COMPLETE CBC W/AUTO DIFF WBC: CPT | Performed by: PHYSICIAN ASSISTANT

## 2017-12-05 PROCEDURE — 40000141 ZZH STATISTIC PERIPHERAL IV START W/O US GUIDANCE: Mod: ZF

## 2017-12-05 PROCEDURE — 86901 BLOOD TYPING SEROLOGIC RH(D): CPT | Performed by: INTERNAL MEDICINE

## 2017-12-05 PROCEDURE — 86900 BLOOD TYPING SEROLOGIC ABO: CPT | Performed by: INTERNAL MEDICINE

## 2017-12-05 PROCEDURE — 25000128 H RX IP 250 OP 636: Mod: ZF | Performed by: INTERNAL MEDICINE

## 2017-12-05 PROCEDURE — 96361 HYDRATE IV INFUSION ADD-ON: CPT

## 2017-12-05 RX ADMIN — SODIUM CHLORIDE 1000 ML: 9 INJECTION, SOLUTION INTRAVENOUS at 12:44

## 2017-12-05 ASSESSMENT — PAIN SCALES - GENERAL: PAINLEVEL: MODERATE PAIN (4)

## 2017-12-05 NOTE — PROGRESS NOTES
Infusion Nursing Note:  Amelia Michel presents today for possible platelets and IV fluids.    Patient seen by provider today: No   present during visit today: Not Applicable.    Note: Patient complained of feeling weaker and lightheaded, reports feeing dehydrated. Patient denies complaints of pain, dyspnea, dizziness, nausea, and constipation. Reported having a loose stool this morning, but that was an isolated incident. Dr. Rodriguez notified of the above.    TORB per Dr. Rodriguez on 12/5/2017 at 1224:   -Give 1 L NS bolus  -HOLD lovenox for platelets 25,000 count    TORB per Kelly Mcginnis on 12/5/2017 at 1301:  -Review neutropenic precautions  -Resume prophylactic levaquin in the setting of neutropenia    Patient educated on neutropenic precautions and given handout. Patient verbalized understanding for strict hand hygiene, avoiding crowds and sick people, resuming oral levaquin, wearing masks when in public.    Intravenous Access:  Peripheral IV placed.    Treatment Conditions:  Lab Results   Component Value Date    HGB 10.1 12/05/2017     Lab Results   Component Value Date    WBC 0.5 12/05/2017      Lab Results   Component Value Date    ANEU 0.3 12/05/2017     Lab Results   Component Value Date    PLT 25 12/05/2017      Lab Results   Component Value Date     11/29/2017                   Lab Results   Component Value Date    POTASSIUM 3.9 11/29/2017           Lab Results   Component Value Date    MAG 1.7 09/11/2017            Lab Results   Component Value Date    CR 0.57 11/29/2017                   Lab Results   Component Value Date    VIKTORIYA 9.7 11/29/2017                Lab Results   Component Value Date    BILITOTAL 0.3 11/29/2017           Lab Results   Component Value Date    ALBUMIN 3.4 11/29/2017                    Lab Results   Component Value Date    ALT 52 11/29/2017           Lab Results   Component Value Date    AST 50 11/29/2017     Results reviewed, labs did NOT meet treatment  parameters: blood transfusion not needed today.    Post Infusion Assessment:  Patient tolerated infusion without incident.  Site patent and intact, free from redness, edema or discomfort.  No evidence of extravasations.  Access discontinued per protocol.    Discharge Plan:   Patient declined prescription refills.  Discharge instructions reviewed with: Patient.  Patient and/or family verbalized understanding of discharge instructions and all questions answered.  Copy of AVS reviewed with patient and/or family.  Patient will return 12/8 for next appointment.  Patient discharged in stable condition accompanied by: .  Departure Mode: Ambulatory.    Niranjan Kaba RN

## 2017-12-05 NOTE — NURSING NOTE
Chief Complaint   Patient presents with     Blood Draw     IV placed by RN (Bisi) VS taken and pt checked in for next appt.      Lizzy Fisher

## 2017-12-05 NOTE — PATIENT INSTRUCTIONS
Restart Levaquin per CRISTA Gates  Coping with Neutropenia   What is neutropenia?  Neutropenia means that you have fewer white blood cells than normal.   Your white blood cells protect you from infection. If your white blood cells become too low, your risk of infection increases.   Signs of an infection include:    Any temperature more than 100.4 F (38 C) (taken under the tongue)    Shaking chills    Pain when urinating    Pain when breathing    Cough or sore throat    A red, swollen or painful cut or wound, or fluid coming from a cut or wound.  How is neutropenia treated?  If you already have an infection, your care team may give you medicine to help fight it. If you do not have an infection, you may receive:     Neulasta (pegfilgrastim)    Neupogen (filgrastim)    Other: ________________________  These medicines can help your body make more white blood cells. Your care team will tell you if medicine is right for you. You will need a number of blood tests to see how well your treatment is working.  What else can I do to prevent infection?    Wash your hands often, including before meals and after using the toilet. Hand washing is the best way to prevent infections. Remind visitors to wash their hands as well.    Take a warm shower each day and pat your skin dry.    Rinse your mouth with mild salt water four times a day.    Brush your teeth with a soft toothbrush after meals. Floss gently.    Take steps to prevent cuts or scrapes, which can lead to infection. For example: use an electric razor, be careful with sharp objects, wear gloves when possible and don't go barefoot.    If you have a cut or scrape, wash the area with soap and water, then cover it with a clean bandage until it heals.    Use lotion to prevent cracks in your skin.    Do not cut your cuticles. Use cream removers instead. Do not wear fake fingernails.    Try to prevent constipation (hard stools). Drink plenty of fluids. If you can, eat whole  "grains and fresh fruits and vegetables. Ask your care team if you should take a stool softener (such as Colace).    Wipe yourself from front to back after using the toilet.    Avoid:    People who have a cold or the flu    Young children who have recently received a live vaccine    Large crowds    Fresh plants, flowers, dried cunningham and dirt.    Do not have surgery or dental work.    Do not have sex until your white blood cells are back to normal. Do not use enemas, suppositories, douches or tampons.    Ask others to clean up after pets.    Always talk to your cancer doctor before receiving shots (immunization) .  When should I call my care team?  Call your care team if:    Your temperature is more than 100.4 F (38 C) (taken under the tongue).    You have chills and are shaking.    You have pain when urinating (using the toilet), or you need to pee more often than normal.    You have itching or unusual drainage from your vagina.    You have pain when breathing, or you are having a hard time breathing.    You have a cough or sore throat.    You have soft white patches on your tongue or the sides of your mouth.    You see redness, swelling or fluid draining from a cut or wound.    You suddenly feel very weak and tired.  Comments:  ____________________________________  _______________________________________  _______________________________________  _______________________________________  _______________________________________  _______________________________________  Food Safety Guidelines  Buying food    Buy foods before the expiration date listed on food labels. (also called the \"use by\" date.)    Check that milk, apple cider, egg and cheese products have been pasteurized. This should be printed on their labels.    Buy fruits and vegetables with unbroken skins.  Preparing food    Check the labels on your foods. Throw away any foods that are past their \"use-by\" dates.    Wash your hands before making food.    Use " soap and water to wash the surfaces you use to prepare food. Dry the surfaces well. Use only clean utensils.    Wash all raw fruits and vegetables.    Keep uncooked foods separate from cooked foods.    Use one cutting board (non-wood) for raw meats, another for fruits and vegetables, and a third for prepared foods. Wash cutting boards often. This will help prevent cross-contamination.    Thaw frozen food in the refrigerator or in cold water. Do not thaw at room temperature.    Cook raw meat well. The temperature inside the meat should stay at the following for at least 15 seconds:    165 F (74 C) or higher for poultry (whole or ground chicken or turkey)    155 F (68 C) or higher for ground meat (beef, pork) or ground fish    145 F (63 C) for whole beef, pork or fish  Meals and snacks    Wash your hands before eating. Always use clean plates, glasses and utensils.    Avoid raw fish, raw meat, uncooked eggs, raw cookie dough and natural cheese. (This includes moldy cheese, such as blue cheese, as well as cheese made with unpasteurized milk).    Do not drink from a can or bottle. Wash the can or bottle before opening it, and then pour the drink into a cup.  Storing leftovers    Throw out leftovers that have been at room temperature longer than two hours.    Throw out leftovers older than two days.    When reheating leftovers, the temperature inside should be at 165 F (74 C) for at least 15 seconds.    Refrigerated foods should be stored at of 40 F (4.5 C) or less.  Dining out    Ask staff to make sure there are no raw ingredients (such as raw eggs or meat) in your food.    Ask that egg dishes be fully cooked. Meat should be medium-well or well-done.    Avoid buffets and salad bars.  For informational purposes only. Not to replace the advice of your health care provider.   Copyright   2006 [facility]. All rights reserved. [employer] 942444 - REV 10/15..

## 2017-12-05 NOTE — MR AVS SNAPSHOT
After Visit Summary   12/5/2017    Amelia Michel    MRN: [id number]           Patient Information     Date Of Birth          1960        Visit Information        Provider Department      12/5/2017 11:30 AM  32 ATC;  ONCOLOGY INFUSION [facility]        Today's Diagnoses     Diffuse large B-cell lymphoma of extranodal site (H)    -  1      Care Instructions    Restart Levaquin per CRISTA Gates  Coping with Neutropenia   What is neutropenia?  Neutropenia means that you have fewer white blood cells than normal.   Your white blood cells protect you from infection. If your white blood cells become too low, your risk of infection increases.   Signs of an infection include:    Any temperature more than 100.4 F (38 C) (taken under the tongue)    Shaking chills    Pain when urinating    Pain when breathing    Cough or sore throat    A red, swollen or painful cut or wound, or fluid coming from a cut or wound.  How is neutropenia treated?  If you already have an infection, your care team may give you medicine to help fight it. If you do not have an infection, you may receive:     Neulasta (pegfilgrastim)    Neupogen (filgrastim)    Other: ________________________  These medicines can help your body make more white blood cells. Your care team will tell you if medicine is right for you. You will need a number of blood tests to see how well your treatment is working.  What else can I do to prevent infection?    Wash your hands often, including before meals and after using the toilet. Hand washing is the best way to prevent infections. Remind visitors to wash their hands as well.    Take a warm shower each day and pat your skin dry.    Rinse your mouth with mild salt water four times a day.    Brush your teeth with a soft toothbrush after meals. Floss gently.    Take steps to prevent cuts or scrapes, which can lead to infection. For example: use an electric razor, be careful with  sharp objects, wear gloves when possible and don't go barefoot.    If you have a cut or scrape, wash the area with soap and water, then cover it with a clean bandage until it heals.    Use lotion to prevent cracks in your skin.    Do not cut your cuticles. Use cream removers instead. Do not wear fake fingernails.    Try to prevent constipation (hard stools). Drink plenty of fluids. If you can, eat whole grains and fresh fruits and vegetables. Ask your care team if you should take a stool softener (such as Colace).    Wipe yourself from front to back after using the toilet.    Avoid:    People who have a cold or the flu    Young children who have recently received a live vaccine    Large crowds    Fresh plants, flowers, dried cunningham and dirt.    Do not have surgery or dental work.    Do not have sex until your white blood cells are back to normal. Do not use enemas, suppositories, douches or tampons.    Ask others to clean up after pets.    Always talk to your cancer doctor before receiving shots (immunization) .  When should I call my care team?  Call your care team if:    Your temperature is more than 100.4 F (38 C) (taken under the tongue).    You have chills and are shaking.    You have pain when urinating (using the toilet), or you need to pee more often than normal.    You have itching or unusual drainage from your vagina.    You have pain when breathing, or you are having a hard time breathing.    You have a cough or sore throat.    You have soft white patches on your tongue or the sides of your mouth.    You see redness, swelling or fluid draining from a cut or wound.    You suddenly feel very weak and tired.  Comments:  ____________________________________  _______________________________________  _______________________________________  _______________________________________  _______________________________________  _______________________________________  Food Safety Guidelines  Buying food    Buy foods  "before the expiration date listed on food labels. (also called the \"use by\" date.)    Check that milk, apple cider, egg and cheese products have been pasteurized. This should be printed on their labels.    Buy fruits and vegetables with unbroken skins.  Preparing food    Check the labels on your foods. Throw away any foods that are past their \"use-by\" dates.    Wash your hands before making food.    Use soap and water to wash the surfaces you use to prepare food. Dry the surfaces well. Use only clean utensils.    Wash all raw fruits and vegetables.    Keep uncooked foods separate from cooked foods.    Use one cutting board (non-wood) for raw meats, another for fruits and vegetables, and a third for prepared foods. Wash cutting boards often. This will help prevent cross-contamination.    Thaw frozen food in the refrigerator or in cold water. Do not thaw at room temperature.    Cook raw meat well. The temperature inside the meat should stay at the following for at least 15 seconds:    165 F (74 C) or higher for poultry (whole or ground chicken or turkey)    155 F (68 C) or higher for ground meat (beef, pork) or ground fish    145 F (63 C) for whole beef, pork or fish  Meals and snacks    Wash your hands before eating. Always use clean plates, glasses and utensils.    Avoid raw fish, raw meat, uncooked eggs, raw cookie dough and natural cheese. (This includes moldy cheese, such as blue cheese, as well as cheese made with unpasteurized milk).    Do not drink from a can or bottle. Wash the can or bottle before opening it, and then pour the drink into a cup.  Storing leftovers    Throw out leftovers that have been at room temperature longer than two hours.    Throw out leftovers older than two days.    When reheating leftovers, the temperature inside should be at 165 F (74 C) for at least 15 seconds.    Refrigerated foods should be stored at of 40 F (4.5 C) or less.  Dining out    Ask staff to make sure there are no raw " ingredients (such as raw eggs or meat) in your food.    Ask that egg dishes be fully cooked. Meat should be medium-well or well-done.    Avoid buffets and salad bars.  For informational purposes only. Not to replace the advice of your health care provider.   Copyright   2006 [facility]. All rights reserved. [employer] 675043 - REV 10/15..            Follow-ups after your visit        Your next 10 appointments already scheduled     Dec 08, 2017  9:30 AM CST   Masonic Lab Draw with  MASONIC LAB DRAW   [facility] Masonic Lab Draw ([facility])    [address] [place] [id number]   [phone]            Dec 08, 2017 10:00 AM CST   Infusion 120 with UC ONCOLOGY INFUSION, UC 29 ATC   [facility] ([facility])    [address] [place] [id number]   [phone]            Dec 08, 2017 12:30 PM CST   NEW WOUND NURSE VISIT with Pj Danielle RN   [facility] Wound Ostomy ([facility])    [address] [place] [id number]   [phone]            Dec 12, 2017  8:45 AM CST   Masonic Lab Draw with UC MASONIC LAB DRAW   [facility] Masonic Lab Draw ([facility])    [address] [place] [id number]   [phone]            Dec 12, 2017  9:30 AM CST   Infusion 120 with UC ONCOLOGY INFUSION, UC 19 ATC   [facility] ([facility])    [address] [place] [address]   [phone]            Dec 18, 2017  8:40 AM CST   (Arrive by 8:25 AM)   Lumbar Puncture with Kelly Mcginnis PA-C   [facility] ([facility])    [address] [place] [phone]   [phone]            Dec 18, 2017  9:00 AM CST   XR LUMBAR PUNCTURE INTRATHECAL CHEMO ADMIN with UUXR4, UU NEURO RAD    [facility] [place],  Radiology ([facility])    [address] [facility] [id number] [phone]           For nerve root injection, please send or bring copies of any MRIs or other scans you have had.  Bring a list of your current medicines to your exam. (Include vitamins, minerals and over-the-counter medicines.) Leave your valuables at home.  Plan to have someone drive you home afterward.  Stop taking the following medicines (but talk to your doctor first):   If you take blood thinners, you may need to stop taking them a few days before treatment. Talk to your doctor before stopping these medicines.Stop taking Coumadin (warfarin) 3 days before treatment. Restart the day after treatment.   If you take Plavix, Ticlid, Pletal or Persantine, please ask your doctor if you should stop these medicines. You may need extra tests on the morning of your scan.   If you take Xarelto (Rivaroxaban), you may need to stop taking it 24 hours before treatment. Talk to your doctor before stopping this medicine. Restart the day after treatment.  You may take your other medicines as normal.  Stop all food and drink (including water) 3 hours before your test or treatment.  Please tell the doctor:   If you are allergic to X-ray dye (contrast fluid).   If you may be pregnant.  Injections take about 30 to 45 minutes. Most people spend up to 2 hours in the clinic or hospital.  Please call the Imaging Department at your exam site with any questions.              Future tests that were ordered for you today     Open Standing Orders        Priority Remaining Interval Expires Ordered    Red blood cell prepare order unit Routine 99/100 CONDITIONAL (SPECIFY) BLOOD  12/4/2017    Platelets prepare order unit Routine 99/100 CONDITIONAL (SPECIFY) BLOOD  12/4/2017            Who to contact     If you have questions or need follow up information about today's clinic visit or your schedule  please contact [facility] directly at [phone].  Normal or non-critical lab and imaging results will be communicated to you by MyChart, letter or phone within 4 business days after the clinic has received the results. If you do not hear from us within 7 days, please contact the clinic through [email or web address] or phone. If you have a critical or abnormal lab result, we will notify you by phone as soon as possible.  Submit refill requests through [employer] or call your pharmacy and they will forward the refill request to us. Please allow 3 business days for your refill to be completed.          Additional Information About Your Visit        [email or web address] Information     [employer] gives you secure access to your electronic health record. If you see a primary care provider, you can also send messages to your care team and make appointments. If you have questions, please call your primary care clinic.  If you do not have a primary care provider, please call [phone] and they will assist you.        Care EveryWhere ID     This is your Care EveryWhere ID. This could be used by other organizations to access your [facility] medical records  [id number]        Your Vitals Were     Pulse Temperature Respirations Pulse Oximetry BMI (Body Mass Index)       113 98  F (36.7  C) 18 98% 26.28 kg/m2        Blood Pressure from Last 3 Encounters:   12/05/17 (!) 135/96   11/29/17 124/69   11/20/17 123/69    Weight from Last 3 Encounters:   12/05/17 57 kg (125 lb 11.2 oz)   11/29/17 55.1 kg (121 lb 6.4 oz)   11/20/17 53.8 kg (118 lb 9.6 oz)              We Performed the Following     *CBC with platelets differential     ABO/Rh type and screen     Blood component     Platelets prepare order unit        Primary Care Provider Office Phone # Fax #    Comfort Sabillon [phone] [id number]        Equal Access to Services     CATINA HEAD AH: khari Nayak  mitchtania durga santizo ah. So [facility] [phone].    ATENCIÓN: Si marlee gutierrez, tiene a sarmiento disposición servicios gratuitos de asistencia lingüística. Taylor al [phone].    We comply with applicable federal civil rights laws and [place] laws. We do not discriminate on the basis of race, color, national origin, age, disability, sex, sexual orientation, or gender identity.            Thank you!     Thank you for choosing [facility]  for your care. Our goal is always to provide you with excellent care. Hearing back from our patients is one way we can continue to improve our services. Please take a few minutes to complete the written survey that you may receive in the mail after your visit with us. Thank you!             Your Updated Medication List - Protect others around you: Learn how to safely use, store and throw away your medicines at www.disposemymeds.org.          This list is accurate as of: 12/5/17  1:38 PM.  Always use your most recent med list.                   Brand Name Dispense Instructions for use Diagnosis    acetaminophen 325 MG tablet    TYLENOL     Take 2 tablets (650 mg) by mouth every 4 hours as needed for mild pain, fever or headaches    Diffuse large B-cell lymphoma of extranodal site (H)       acyclovir 400 MG tablet    ZOVIRAX    60 tablet    Take 1 tablet (400 mg) by mouth 2 times daily    Diffuse large B-cell lymphoma, unspecified body region (H)       ascorbic acid 500 MG Tabs     30 tablet    Take 1 tablet (500 mg) by mouth daily    Diffuse large B-cell lymphoma, unspecified body region (H)       atenolol 25 MG tablet    TENORMIN    60 tablet    Take 1 tablet (25 mg) by mouth 2 times daily    Atrial tachycardia (H)       BENADRYL ALLERGY PO      Take 50 mg by mouth        calcium polycarbophil 625 MG tablet    FIBERCON     Take 1 tablet by mouth 2 times daily        calcium-vitamin D 600-400 MG-UNIT per tablet     CALTRATE    60 tablet    Take 2 tablets by mouth every morning    Diffuse large B-cell lymphoma, unspecified body region (H)       cetirizine 10 MG tablet    zyrTEC    30 tablet    Take 1 tablet (10 mg) by mouth daily    Hypophosphatemia       digoxin 125 MCG tablet    LANOXIN    30 tablet    Take 1 tablet (125 mcg) by mouth daily    Atrial tachycardia (H)       enoxaparin 40 MG/0.4ML injection    LOVENOX    30 Syringe    Inject 0.4 mLs (40 mg) Subcutaneous daily    VTE (venous thromboembolism), Paroxysmal atrial fibrillation (H)       fluconazole 100 MG tablet    DIFLUCAN    30 tablet    Take 1 tablet (100 mg) by mouth daily    Diffuse large B-cell lymphoma, unspecified body region (H)       gabapentin 100 MG capsule    NEURONTIN    180 capsule    Take 2 capsules (200 mg) by mouth 3 times daily    Critical illness myopathy       HYDROCORTISONE PO      Take 100 mg by mouth IV        levofloxacin 250 MG tablet    LEVAQUIN    30 tablet    Take 1 tablet (250 mg) by mouth daily    Chemotherapy-induced neutropenia (H)       lidocaine visc 2% 2.5mL/5mL & maalox/mylanta w/ simeth 2.5mL/5mL & diphenhydrAMINE 5mg/5mL Susp suspension    [facility]     Swish and swallow 10 mLs in mouth 2 times daily        multivitamin, therapeutic with minerals Tabs tablet     30 each    Take 1 tablet by mouth daily    Diffuse large B-cell lymphoma, unspecified body region (H)       ondansetron 8 MG ODT tab    ZOFRAN-ODT    20 tablet    Take 1 tablet (8 mg) by mouth every 8 hours as needed    Diffuse large B-cell lymphoma of extranodal site (H)       potassium & sodium phosphates 280-160-250 MG Packet    NEUTRA-PHOS    60 packet    Take 1 packet by mouth 2 times daily    Hypophosphatemia       potassium chloride 20 MEQ Packet    KLOR-CON    15 tablet    Take 10 mEq by mouth daily    Hypokalemia       predniSONE 5 MG tablet    DELTASONE    30 tablet    Take 1 tablet (5 mg) by mouth daily    Diffuse large B-cell lymphoma,  unspecified body region (H)       tolterodine 4 MG 24 hr capsule    DETROL LA    30 capsule    Take 1 capsule (4 mg) by mouth daily    Urinary urgency       traMADol 50 MG tablet    ULTRAM    30 tablet    Take 1 tablet (50 mg) by mouth every 6 hours as needed for moderate pain    Diffuse large B-cell lymphoma of extranodal site (H)

## 2017-12-07 ENCOUNTER — DOCUMENTATION ONLY (OUTPATIENT)
Dept: ONCOLOGY | Facility: CLINIC | Age: 57
End: 2017-12-07

## 2017-12-07 RX ORDER — ACETAMINOPHEN 325 MG/1
325 TABLET ORAL ONCE
Status: CANCELLED
Start: 2017-12-07 | End: 2017-12-07

## 2017-12-07 RX ORDER — DIPHENHYDRAMINE HCL 25 MG
25 CAPSULE ORAL
Status: CANCELLED
Start: 2017-12-07

## 2017-12-07 NOTE — PROGRESS NOTES
Form Request Documentation    Date Received in Clinic:  11/29/17  Name/Type of Form: FMLA for family member  Questions that need to be addressed:   Current Employment Status: spouse is currently working   Amount of Leave Requested: Intermittent Leave    Other: None  Date Completed: 12/7/2017  Copy Mailed to patient: Yes on 12/7/2017  Disposition of Form: [facility] [phone]

## 2017-12-08 ENCOUNTER — OFFICE VISIT (OUTPATIENT)
Dept: WOUND CARE | Facility: CLINIC | Age: 57
End: 2017-12-08

## 2017-12-08 ENCOUNTER — INFUSION THERAPY VISIT (OUTPATIENT)
Dept: ONCOLOGY | Facility: CLINIC | Age: 57
End: 2017-12-08
Attending: INTERNAL MEDICINE
Payer: COMMERCIAL

## 2017-12-08 VITALS
RESPIRATION RATE: 18 BRPM | DIASTOLIC BLOOD PRESSURE: 71 MMHG | WEIGHT: 121.5 LBS | BODY MASS INDEX: 25.4 KG/M2 | OXYGEN SATURATION: 96 % | TEMPERATURE: 98.1 F | SYSTOLIC BLOOD PRESSURE: 108 MMHG | HEART RATE: 94 BPM

## 2017-12-08 VITALS
TEMPERATURE: 98.9 F | SYSTOLIC BLOOD PRESSURE: 104 MMHG | HEART RATE: 100 BPM | RESPIRATION RATE: 18 BRPM | DIASTOLIC BLOOD PRESSURE: 69 MMHG

## 2017-12-08 DIAGNOSIS — S41.102A WOUND OF LEFT UPPER EXTREMITY, INITIAL ENCOUNTER: Primary | ICD-10-CM

## 2017-12-08 DIAGNOSIS — C83.398 DIFFUSE LARGE B-CELL LYMPHOMA OF EXTRANODAL SITE: Primary | ICD-10-CM

## 2017-12-08 LAB
ACANTHOCYTES BLD QL SMEAR: SLIGHT
ANISOCYTOSIS BLD QL SMEAR: SLIGHT
BASOPHILS # BLD AUTO: 0 10E9/L (ref 0–0.2)
BASOPHILS NFR BLD AUTO: 0.9 %
BLD PROD TYP BPU: NORMAL
BLD PROD TYP BPU: NORMAL
BLD UNIT ID BPU: 0
BLD UNIT ID BPU: 0
BLOOD PRODUCT CODE: NORMAL
BLOOD PRODUCT CODE: NORMAL
BPU ID: NORMAL
BPU ID: NORMAL
DIFFERENTIAL METHOD BLD: ABNORMAL
EOSINOPHIL # BLD AUTO: 0 10E9/L (ref 0–0.7)
EOSINOPHIL NFR BLD AUTO: 0 %
ERYTHROCYTE [DISTWIDTH] IN BLOOD BY AUTOMATED COUNT: 14 % (ref 10–15)
HCT VFR BLD AUTO: 29.7 % (ref 35–47)
HGB BLD-MCNC: 9.9 G/DL (ref 11.7–15.7)
LYMPHOCYTES # BLD AUTO: 0.3 10E9/L (ref 0.8–5.3)
LYMPHOCYTES NFR BLD AUTO: 8.7 %
MCH RBC QN AUTO: 38.7 PG (ref 26.5–33)
MCHC RBC AUTO-ENTMCNC: 33.3 G/DL (ref 31.5–36.5)
MCV RBC AUTO: 116 FL (ref 78–100)
METAMYELOCYTES # BLD: 0.2 10E9/L
METAMYELOCYTES NFR BLD MANUAL: 5.2 %
MONOCYTES # BLD AUTO: 0.5 10E9/L (ref 0–1.3)
MONOCYTES NFR BLD AUTO: 13.9 %
MYELOCYTES # BLD: 0 10E9/L
MYELOCYTES NFR BLD MANUAL: 0.9 %
NEUTROPHILS # BLD AUTO: 2.6 10E9/L (ref 1.6–8.3)
NEUTROPHILS NFR BLD AUTO: 70.4 %
PLATELET # BLD AUTO: 13 10E9/L (ref 150–450)
PLATELET # BLD EST: ABNORMAL 10*3/UL
RBC # BLD AUTO: 2.56 10E12/L (ref 3.8–5.2)
TRANSFUSION STATUS PATIENT QL: NORMAL
WBC # BLD AUTO: 3.7 10E9/L (ref 4–11)

## 2017-12-08 PROCEDURE — 85025 COMPLETE CBC W/AUTO DIFF WBC: CPT | Performed by: PHYSICIAN ASSISTANT

## 2017-12-08 PROCEDURE — 99212 OFFICE O/P EST SF 10 MIN: CPT | Mod: 25

## 2017-12-08 PROCEDURE — 96374 THER/PROPH/DIAG INJ IV PUSH: CPT

## 2017-12-08 PROCEDURE — 40000141 ZZH STATISTIC PERIPHERAL IV START W/O US GUIDANCE: Mod: ZF

## 2017-12-08 PROCEDURE — 25000128 H RX IP 250 OP 636: Mod: ZF | Performed by: INTERNAL MEDICINE

## 2017-12-08 PROCEDURE — 36415 COLL VENOUS BLD VENIPUNCTURE: CPT

## 2017-12-08 PROCEDURE — P9037 PLATE PHERES LEUKOREDU IRRAD: HCPCS | Performed by: INTERNAL MEDICINE

## 2017-12-08 PROCEDURE — 36430 TRANSFUSION BLD/BLD COMPNT: CPT

## 2017-12-08 PROCEDURE — 25000132 ZZH RX MED GY IP 250 OP 250 PS 637: Mod: ZF | Performed by: INTERNAL MEDICINE

## 2017-12-08 RX ORDER — DIPHENHYDRAMINE HCL 25 MG
25 CAPSULE ORAL
Status: COMPLETED | OUTPATIENT
Start: 2017-12-08 | End: 2017-12-08

## 2017-12-08 RX ORDER — ACETAMINOPHEN 325 MG/1
325 TABLET ORAL ONCE
Status: COMPLETED | OUTPATIENT
Start: 2017-12-08 | End: 2017-12-08

## 2017-12-08 RX ADMIN — ACETAMINOPHEN 325 MG: 325 TABLET ORAL at 11:07

## 2017-12-08 RX ADMIN — HYDROCORTISONE SODIUM SUCCINATE 100 MG: 100 INJECTION, POWDER, FOR SOLUTION INTRAMUSCULAR; INTRAVENOUS at 11:06

## 2017-12-08 RX ADMIN — DIPHENHYDRAMINE HYDROCHLORIDE 25 MG: 25 CAPSULE ORAL at 11:06

## 2017-12-08 ASSESSMENT — PAIN SCALES - GENERAL: PAINLEVEL: NO PAIN (0)

## 2017-12-08 NOTE — PROGRESS NOTES
Infusion Nursing Note:  Amelia Michel presents today for Possible platelets.    Patient seen by provider today: No   present during visit today: Not Applicable.    Note: Patient denies unusual bruising or bleeding today. Reports having a URI, but denies fevers.    Intravenous Access:  Peripheral IV placed per Vascular access    Treatment Conditions:  Lab Results   Component Value Date    HGB 9.9 12/08/2017     Lab Results   Component Value Date    WBC 3.7 12/08/2017      Lab Results   Component Value Date    ANEU 2.6 12/08/2017     Lab Results   Component Value Date    PLT 13 12/08/2017      Lab Results   Component Value Date     11/29/2017                   Lab Results   Component Value Date    POTASSIUM 3.9 11/29/2017           Lab Results   Component Value Date    MAG 1.7 09/11/2017            Lab Results   Component Value Date    CR 0.57 11/29/2017                   Lab Results   Component Value Date    VIKTORIYA 9.7 11/29/2017                Lab Results   Component Value Date    BILITOTAL 0.3 11/29/2017           Lab Results   Component Value Date    ALBUMIN 3.4 11/29/2017                    Lab Results   Component Value Date    ALT 52 11/29/2017           Lab Results   Component Value Date    AST 50 11/29/2017     Results reviewed, labs DID NOT MEET treatment parameters, See TORB below:  Blood transfusion consent signed 8/31/17.    TORB per Kelly TOBIN/Lorie Burris RN 12/8/2017 @ 1040:  -Please have patient return on 12/10/17 for platelets. If the patient is unable to return on the weekend, then okay to transfuse 1 unit platelets today per the standing orders on the blood product plan.  -Remind patient to continue holding her Lovenox as platelets are under 50,000  -Notify patient that she may stop taking prophylactic Levaquin as she is no longer neutropenic    NOTE: Patient opted for platelet transfusion today as she had another appointment in the afternoon with wound and skin. The patient  also reports she is a very difficult IV stick, and usually requires IVs with U/S.    Post Infusion Assessment:  Patient tolerated 1 unit platelet transfusion without incident.  Blood return noted pre and post infusion.  Site patent and intact, free from redness, edema or discomfort.  No evidence of extravasations.  Access discontinued per protocol.    Discharge Plan:   Patient declined prescription refills.  Discharge instructions reviewed with: Patient.  Patient and/or family verbalized understanding of discharge instructions and all questions answered.  Copy of AVS reviewed with patient and/or family.  Patient will return Tuesday 12/12/17 for next appointment.  Patient discharged in stable condition accompanied by: self and .  Departure Mode: Ambulatory.  Face to Face time: 5 min.    Lorie Burris RN

## 2017-12-08 NOTE — MR AVS SNAPSHOT
After Visit Summary   12/8/2017    Amelia Michel    MRN: [id number]           Patient Information     Date Of Birth          1960        Visit Information        Provider Department      12/8/2017 10:00 AM  29 ATC;  ONCOLOGY INFUSION [facility]        Today's Diagnoses     Diffuse large B-cell lymphoma of extranodal site (H)    -  1      Care Instructions    Contact Numbers    [facility] Main Line: [phone]  [facility] Triage:  [phone]    Call triage with chills and/or temperature greater than or equal to 100.5, uncontrolled nausea/vomiting, diarrhea, constipation, dizziness, shortness of breath, chest pain, bleeding, unexplained bruising, or any new/concerning symptoms, questions/concerns.     If you are having any concerning symptoms or wish to speak to a provider before your next infusion visit, please call your care coordinator or triage to notify them so we can adequately serve you.       After Hours: [phone]    If after hours, weekends, or holidays, call main hospital  and ask for Oncology doctor on call.           December 2017 Sunday Monday Tuesday Wednesday Thursday Friday Saturday                            1     2       3     4     5     [facility] DRAW   11:00 AM   (15 min.)   [facility] DRAW   [facility] Lab Draw     [facility] ONC INFUSION 120   11:30 AM   (120 min.)    ONCOLOGY INFUSION   [facility] 6     7     8     [facility] D[facility]    9:30 AM   (15 min.)    [facility] DRAW   [facility] Lab Draw     [facility] ONC INFUSION 120   10:00 AM   (120 min.)    [facility]   [facility]   12:30 PM   (60 min.)   Pj Danielle, RN   [facility] Wound Ostomy 9       10     11     12     [facility] DRAW    8:45 AM   (15 min.)    [facility] DRAW   [facility] Lab Draw     [facility] ONC INFUSION 120    9:30 AM   (120 min.)    ONCOLOGY INFUSION   [facility]  Cancer Clinic 13     14     15     16       17     18     [facility] LUMBAR PUNCTURE    8:25 AM   (100 min.)   Kelly Mcginnis PA-C   [facility]     XR LP INTRATHECAL CHEMO ADMIN    9:00 AM   (60 min.)   UUXR4   [facility],  Radiology 19     20     21     22     23       24     25     26     27     28     29     30       31                                              January 2018 Sunday Monday Tuesday Wednesday Thursday Friday Saturday        1     2     3     4     5     6       7     8     [facility] RETURN   10:15 AM   (30 min.)   Archana Green PA-C   [facility]rology and Inst for Prostate and Urologic Cancers 9     10     11     12     UMP RETURN    7:45 AM   (30 min.)   Pj Cunha MD   [facility] Rheumatology 13       14     15     PET ONCOLOGY WHOLE BODY    8:15 AM   (45 min.)   UUPET1   [facility] PET CT 16     17     18     UMP ONC RETURN    3:30 PM   (30 min.)   Lenore Rodriguez MD   [facility] Blood and Marrow Transplant 19     20       21     22     23     24     25     26     27       28     29     30     31     UMP RETURN ARRHYTHMIA    9:45 AM   (30 min.)   Juan Eaton MD   [facility] Heart Care                             Lab Results:  Recent Results (from the past 12 hour(s))   Blood component    Collection Time: 12/08/17  1:00 AM   Result Value Ref Range    Unit Number [id number]     Blood Component Type PlateletPheresis,LeukoRed Irrad (Part 3)     Division Number 00     Status of Unit Released to care unit 12/08/2017 1039     Blood Product Code D2307A08     Unit Status ISS    *CBC with platelets differential    Collection Time: 12/08/17  9:35 AM   Result Value Ref Range    WBC 3.7 (L) 4.0 - 11.0 10e9/L    RBC Count 2.56 (L) 3.8 - 5.2 10e12/L    Hemoglobin 9.9 (L) 11.7 - 15.7 g/dL    Hematocrit 29.7 (L) 35.0 - 47.0 %     (H) 78 - 100 fl    MCH 38.7 (H) 26.5 - 33.0 pg    MCHC 33.3 31.5 - 36.5 g/dL    RDW 14.0 10.0 - 15.0 %    Platelet Count 13 (LL) 150 -  450 10e9/L    Diff Method Manual Differential     % Neutrophils 70.4 %    % Lymphocytes 8.7 %    % Monocytes 13.9 %    % Eosinophils 0.0 %    % Basophils 0.9 %    % Metamyelocytes 5.2 %    % Myelocytes 0.9 %    Absolute Neutrophil 2.6 1.6 - 8.3 10e9/L    Absolute Lymphocytes 0.3 (L) 0.8 - 5.3 10e9/L    Absolute Monocytes 0.5 0.0 - 1.3 10e9/L    Absolute Eosinophils 0.0 0.0 - 0.7 10e9/L    Absolute Basophils 0.0 0.0 - 0.2 10e9/L    Absolute Metamyelocytes 0.2 (H) 0 10e9/L    Absolute Myelocytes 0.0 0 10e9/L    Anisocytosis Slight     Acanthocytes Slight     Platelet Estimate Confirming automated cell count                Follow-ups after your visit        Your next 10 appointments already scheduled     Dec 08, 2017 12:30 PM CST   NEW WOUND NURSE VISIT with Pj Danielle RN   [facility] Wound Ostomy ([facility])    [address] [place] [address]  4th [facility] [id number] [phone]            Dec 12, 2017  8:45 AM CST   [facility] Draw with [facility] DRAW   [facility] Lab Draw ([facility])    [address] [place] [address]  2nd [facility] [id number] [phone]            Dec 12, 2017  9:30 AM CST   Infusion 120 with  ONCOLOGY INFUSION, UC 19 ATC   [facility] Cancer [facility] ([facility])    [address] [place] [address]  2nd [facility] [id number] [phone]            Dec 18, 2017  8:40 AM CST   (Arrive by 8:25 AM)   Lumbar Puncture with Kelly Mcginnis PA-C   [facility] ([facility])    [address] [place] [address]  2nd Floor  [facility] [id number] [phone]            Dec 18, 2017  9:00 AM CST   XR LUMBAR PUNCTURE INTRATHECAL CHEMO ADMIN with UUXR4, [facility]U NEURO RAD   [facility] [place],  Radiology ([facility] [place])    [address] [facility] [id number] [phone]           For nerve root injection,  please send or bring copies of any MRIs or other scans you have had.  Bring a list of your current medicines to your exam. (Include vitamins, minerals and over-the-counter medicines.) Leave your valuables at home.  Plan to have someone drive you home afterward.  Stop taking the following medicines (but talk to your doctor first):   If you take blood thinners, you may need to stop taking them a few days before treatment. Talk to your doctor before stopping these medicines.Stop taking Coumadin (warfarin) 3 days before treatment. Restart the day after treatment.   If you take Plavix, Ticlid, Pletal or Persantine, please ask your doctor if you should stop these medicines. You may need extra tests on the morning of your scan.   If you take Xarelto (Rivaroxaban), you may need to stop taking it 24 hours before treatment. Talk to your doctor before stopping this medicine. Restart the day after treatment.  You may take your other medicines as normal.  Stop all food and drink (including water) 3 hours before your test or treatment.  Please tell the doctor:   If you are allergic to X-ray dye (contrast fluid).   If you may be pregnant.  Injections take about 30 to 45 minutes. Most people spend up to 2 hours in the clinic or hospital.  Please call the Imaging Department at your exam site with any questions.            Jan 08, 2018 10:30 AM CST   (Arrive by 10:15 AM)   Return Visit with Archana Green PA-C   [facility] Urology and Inst for Prostate and Urologic Cancers ([facility] [place])    [address] [place] [address]  4th [facility] [id number] [phone]            Jan 12, 2018  8:00 AM CST   (Arrive by 7:45 AM)   Return Visit with Pj Cunha MD   [facility] Rheumatology ([facility])    [address] [id number] [phone]            Eric 15, 2018  8:15 AM CST   PET ONCOLOGY WHOLE BODY with UUPET1   [facility] [place] PET CT ([facility])    [address] [facility] [address] [phone]           Tell your doctor:   If there is any chance you may be pregnant or if you are breastfeeding.   If you have problems lying in small spaces (claustrophobia). If you do, your doctor may give you medicine to help you relax. If you have diabetes:   Have your exam early in the morning. Your blood glucose will go up as the day goes by.   Your glucose level must be 180 or less at the start of the exam. Please take any medicines you need to ensure this blood glucose level. 24 hours before your scan: Don t do any heavy exercise. (No jogging, aerobics or other workouts.) Exercise will make your pictures less accurate. 6 hours before your scan:   Stop all food and liquids (except water).   Do not chew gum or suck on mints.   If you need to take medicine with food, you may take it with a few crackers.  Please call your Imaging Department at your exam site with any questions.              Future tests that were ordered for you today     Open Standing Orders        Priority Remaining Interval Expires Ordered    Transfuse platelets unit Routine 99/100 TRANSFUSE 1 DOSE  12/8/2017    Red blood cell prepare order unit Routine 99/100 CONDITIONAL (SPECIFY) BLOOD  12/7/2017    Platelets prepare order unit Routine 99/100 CONDITIONAL (SPECIFY) BLOOD  12/7/2017            Who to contact     If you have questions or need follow up information about today's clinic visit or your schedule please contact [facility] CAN[facility]ER CL[facility] directly at [phone].  Normal or non-critical lab and imaging results will be communicated to you by MyChart, letter or phone within 4 business days after the clinic has received the results. If you do not hear from us within 7 days, please contact the clinic through MyChart or phone. If you have a critical or abnormal lab result, we will notify you by phone as soon as possible.  Submit refill  requests through [employer] or call your pharmacy and they will forward the refill request to us. Please allow 3 business days for your refill to be completed.          Additional Information About Your Visit        [employer] Information     [employer] gives you secure access to your electronic health record. If you see a primary care provider, you can also send messages to your care team and make appointments. If you have questions, please call your primary care clinic.  If you do not have a primary care provider, please call [phone] and they will assist you.        Care EveryWhere ID     This is your Care EveryWhere ID. This could be used by other organizations to access your [place] medical records  [id number]        Your Vitals Were     Pulse Temperature Respirations Pulse Oximetry BMI (Body Mass Index)       80 99.2  F (37.3  C) (Oral) 16 99% 25.4 kg/m2        Blood Pressure from Last 3 Encounters:   12/08/17 109/54   12/05/17 (!) 135/96   11/29/17 124/69    Weight from Last 3 Encounters:   12/08/17 55.1 kg (121 lb 8 oz)   12/05/17 57 kg (125 lb 11.2 oz)   11/29/17 55.1 kg (121 lb 6.4 oz)              We Performed the Following     *CBC with platelets differential     Blood component     Platelets prepare order unit        Primary Care Provider Office Phone # Fax #    Comfort Sabillon [phone] [address] [facility] [address]        Equal Access to Services     [facility]: Hadii aad ku hadasho Soomaali, waaxda luqadaha, qaybta kaalmada adeegyada, durga hermosillo . So [facility] [phone].    ATENCIÓN: Si habla español, tiene a sarmiento disposición servicios gratuitos de asistencia lingüística. Llame al [phone].    We comply with applicable federal civil rights laws and [place] laws. We do not discriminate on the basis of race, color, national origin, age, disability, sex, sexual orientation, or gender identity.            Thank you!     Thank you for  [facility]  for your care. Our goal is always to provide you with excellent care. Hearing back from our patients is one way we can continue to improve our services. Please take a few minutes to complete the written survey that you may receive in the mail after your visit with us. Thank you!             Your Updated Medication List - Protect others around you: Learn how to safely use, store and throw away your medicines at www.disposemymeds.org.          This list is accurate as of: 12/8/17 10:43 AM.  Always use your most recent med list.                   Brand Name Dispense Instructions for use Diagnosis    acetaminophen 325 MG tablet    TYLENOL     Take 2 tablets (650 mg) by mouth every 4 hours as needed for mild pain, fever or headaches    Diffuse large B-cell lymphoma of extranodal site (H)       acyclovir 400 MG tablet    ZOVIRAX    60 tablet    Take 1 tablet (400 mg) by mouth 2 times daily    Diffuse large B-cell lymphoma, unspecified body region (H)       ascorbic acid 500 MG Tabs     30 tablet    Take 1 tablet (500 mg) by mouth daily    Diffuse large B-cell lymphoma, unspecified body region (H)       atenolol 25 MG tablet    TENORMIN    60 tablet    Take 1 tablet (25 mg) by mouth 2 times daily    Atrial tachycardia (H)       BENADRYL ALLERGY PO      Take 50 mg by mouth        calcium polycarbophil 625 MG tablet    FIBERCON     Take 1 tablet by mouth 2 times daily        calcium-vitamin D 600-400 MG-UNIT per tablet    CALTRATE    60 tablet    Take 2 tablets by mouth every morning    Diffuse large B-cell lymphoma, unspecified body region (H)       cetirizine 10 MG tablet    zyrTEC    30 tablet    Take 1 tablet (10 mg) by mouth daily    Hypophosphatemia       digoxin 125 MCG tablet    LANOXIN    30 tablet    Take 1 tablet (125 mcg) by mouth daily    Atrial tachycardia (H)       enoxaparin 40 MG/0.4ML injection    LOVENOX    30 Syringe    Inject 0.4 mLs (40 mg) Subcutaneous daily     VTE (venous thromboembolism), Paroxysmal atrial fibrillation (H)       fluconazole 100 MG tablet    DIFLUCAN    30 tablet    Take 1 tablet (100 mg) by mouth daily    Diffuse large B-cell lymphoma, unspecified body region (H)       gabapentin 100 MG capsule    NEURONTIN    180 capsule    Take 2 capsules (200 mg) by mouth 3 times daily    Critical illness myopathy       HYDROCORTISONE PO      Take 100 mg by mouth IV        levofloxacin 250 MG tablet    LEVAQUIN    30 tablet    Take 1 tablet (250 mg) by mouth daily    Chemotherapy-induced neutropenia (H)       lidocaine visc 2% 2.5mL/5mL & maalox/mylanta w/ simeth 2.5mL/5mL & diphenhydrAMINE 5mg/5mL Susp suspension    [facility]     Swish and swallow 10 mLs in mouth 2 times daily        multivitamin, therapeutic with minerals Tabs tablet     30 each    Take 1 tablet by mouth daily    Diffuse large B-cell lymphoma, unspecified body region (H)       ondansetron 8 MG ODT tab    ZOFRAN-ODT    20 tablet    Take 1 tablet (8 mg) by mouth every 8 hours as needed    Diffuse large B-cell lymphoma of extranodal site (H)       potassium & sodium phosphates 280-160-250 MG Packet    NEUTRA-PHOS    60 packet    Take 1 packet by mouth 2 times daily    Hypophosphatemia       potassium chloride 20 MEQ Packet    KLOR-CON    15 tablet    Take 10 mEq by mouth daily    Hypokalemia       predniSONE 5 MG tablet    DELTASONE    30 tablet    Take 1 tablet (5 mg) by mouth daily    Diffuse large B-cell lymphoma, unspecified body region (H)       tolterodine 4 MG 24 hr capsule    DETROL LA    30 capsule    Take 1 capsule (4 mg) by mouth daily    Urinary urgency       traMADol 50 MG tablet    ULTRAM    30 tablet    Take 1 tablet (50 mg) by mouth every 6 hours as needed for moderate pain    Diffuse large B-cell lymphoma of extranodal site (H)

## 2017-12-08 NOTE — PROGRESS NOTES
Patient comes to wound clinic for wound assessment per request of Dr. Rodriguez She has an open wound of media left arm just proximal to elbow that she stated was a former peripheral IV site that was done by EMS during cardiac arrest on Memorial day.  Clean gloves are donned and current dressing removed. Yellow scab was over the site when she arrived in clinic with a suture visible that she stated was put in during a procedure in 1969.  This is treatment week: 1      Objective findings:      Location: L upper extremity    Wound aprox 2 cm x 1 cm, wound bed after suture removal and sharp debridement by surgeon covered with firm yellow material thickness, therefore unable to determine depth    Wound description: indurated skin around wound    Tenderness: no    Odor: none     Not inflamed.    Drainage is none    Tunneling:  N/A      Undermining: all around wound     Wound Base: not visible     Slough appearance:  adherent, dry and yellow   100  %    Eschar appearance:  none       Periwound Skin: pink and pale flesh tone induration,      Subjective finding:     Pt states that she had been putting iodosorb on wound and covering with dry gauze.    Patient is assessed for discomfort which is: minimal    Today's status of the wound: initial assessment    Treatment: Dr. Sheldon removed suture and conservatively sharp debrided wound, then wound cleansed with [employer] and NS soln, rinsed with NS, Vashe moistened 1/4 inch strip gauze applied to undermining and wound bed, covered with dry gauze and taped to intact skin.     Pt received the following instructions: Patient and her  were taught to perform wound management as above 2 x day a home and to follow up in 1 week. They both aknowleged understanding.   Signs and symptoms of infection taught. Dr. Sheldon saw patient and was available for supervision of care if needed or if questions should arise and regarding plan of care.    Pj Danielle RN, CWON

## 2017-12-08 NOTE — PATIENT INSTRUCTIONS
Contact Numbers    [facility] Main Line: [phone]  [facility] Triage:  [phone]    Call triage with chills and/or temperature greater than or equal to 100.5, uncontrolled nausea/vomiting, diarrhea, constipation, dizziness, shortness of breath, chest pain, bleeding, unexplained bruising, or any new/concerning symptoms, questions/concerns.     If you are having any concerning symptoms or wish to speak to a provider before your next infusion visit, please call your care coordinator or triage to notify them so we can adequately serve you.       After Hours: [phone]    If after hours, weekends, or holidays, call main hospital  and ask for Oncology doctor on call.           December 2017 Sunday Monday Tuesday Wednesday Thursday Friday Saturday                            1     2       3     4     5     [facility] DRAW   11:00 AM   (15 min.)    [facility] LAB DRAW   [facility] Lab Draw     [facility] ONC INFUSION 120   11:30 AM   (120 min.)    ONCOLOGY INFUSION   [facility] 6     7     8     P [facility] LAB DRAW    9:30 AM   (15 min.)    [facility] LAB DRAW   [facility] Lab Draw     P ONC INFUSION 120   10:00 AM   (120 min.)    ONCOLOGY INFUSION   [facility] NEW WOUND NURSE   12:30 PM   (60 min.)   Pj Danielle, RN   [facility] Wound Ostomy 9       10     11     12     P [facility] LAB DRAW    8:45 AM   (15 min.)    [facility] LAB DRAW   [facility] Lab Draw     [facility] ONC INFUSION 120    9:30 AM   (120 min.)    ONCOLOGY INFUSION   [facility] 13     14     15     16       17     18     P LUMBAR PUNCTURE    8:25 AM   (100 min.)   Kelly Mcginnis PA-C   [facility]     XR LP INTRATHECAL CHEMO ADMIN    9:00 AM   (60 min.)   UUXR4   [facility] [place],  Radiology 19     20     21     22     23       24     25     26     27     28     29     30       31 January 2018    Sunday Monday Tuesday Wednesday Thursday Friday Saturday        1     2     3     4     5     6       7     8     [facility] RETURN   10:15 AM   (30 min.)   Archana Green PA-C   [facility] Urology and Inst for Prostate and Urologic Cancers 9     10     11     12     UMP RETURN    7:45 AM   (30 min.)   Pj Cunha MD   [facility] Rheumatology 13       14     15     PET ONCOLOGY WHOLE BODY    8:15 AM   (45 min.)   UUPET1   [facility] [place] PET CT 16     17     18     UMP ONC RETURN    3:30 PM   (30 min.)   Lenore Rodriguez MD   [facility] Blood and Marrow Transplant 19     20       21     22     23     24     25     26     27       28     29     30     31     UMP RETURN ARRHYTHMIA    9:45 AM   (30 min.)   Juan Eaton MD   [facility] Heart [place] [facility]                             Lab Results:  Recent Results (from the past 12 hour(s))   Blood component    Collection Time: 12/08/17  1:00 AM   Result Value Ref Range    Unit Number [id number]     Blood Component Type PlateletPheresis,LeukoRed Irrad (Part 3)     Division Number 00     Status of Unit Released to care unit 12/08/2017 1039     Blood Product Code [id number]     Unit Status ISS    *CBC with platelets differential    Collection Time: 12/08/17  9:35 AM   Result Value Ref Range    WBC 3.7 (L) 4.0 - 11.0 10e9/L    RBC Count 2.56 (L) 3.8 - 5.2 10e12/L    Hemoglobin 9.9 (L) 11.7 - 15.7 g/dL    Hematocrit 29.7 (L) 35.0 - 47.0 %     (H) 78 - 100 fl    MCH 38.7 (H) 26.5 - 33.0 pg    MCHC 33.3 31.5 - 36.5 g/dL    RDW 14.0 10.0 - 15.0 %    Platelet Count 13 (LL) 150 - 450 10e9/L    Diff Method Manual Differential     % Neutrophils 70.4 %    % Lymphocytes 8.7 %    % Monocytes 13.9 %    % Eosinophils 0.0 %    % Basophils 0.9 %    % Metamyelocytes 5.2 %    % Myelocytes 0.9 %    Absolute Neutrophil 2.6 1.6 - 8.3 10e9/L    Absolute Lymphocytes 0.3 (L) 0.8 - 5.3 10e9/L    Absolute Monocytes 0.5 0.0 - 1.3 10e9/L    Absolute Eosinophils 0.0 0.0 - 0.7 10e9/L    Absolute  Basophils 0.0 0.0 - 0.2 10e9/L    Absolute Metamyelocytes 0.2 (H) 0 10e9/L    Absolute Myelocytes 0.0 0 10e9/L    Anisocytosis Slight     Acanthocytes Slight     Platelet Estimate Confirming automated cell count

## 2017-12-08 NOTE — MR AVS SNAPSHOT
After Visit Summary   12/8/2017    Amelia Michel    MRN: [id number]           Patient Information     Date Of Birth          1960        Visit Information        Provider Department      12/8/2017 12:30 PM Pj Danielle RN [facility] Wound Ostomy        Today's Diagnoses     Wound of left upper extremity, initial encounter    -  1       Follow-ups after your visit        Your next 10 appointments already scheduled     Dec 12, 2017  8:45 AM CST   [facility] Draw with  [facility] DRAW   [facility] Lab Draw ([facility])    [address] [place] [address]   [phone]            Dec 12, 2017  9:30 AM CST   Infusion 120 with UC ONCOLOGY INFUSION, UC 19 ATC   [facility] Cancer [facility] ([facility])    [address] [place] [address]   [phone]            Dec 18, 2017  8:40 AM CST   (Arrive by 8:25 AM)   Lumbar Puncture with Kelly Mcginnis PA-C   [facility])    [address] [place] [address]   [phone]            Dec 18, 2017  9:00 AM CST   XR LUMBAR PUNCTURE INTRATHECAL CHEMO ADMIN with UTENA DUMONTU NEURO RAD   [facility] [place],  Radiology ([facility], [facility])    [address] [facility] [address] [phone]           For nerve root injection, please send or bring copies of any MRIs or other scans you have had.  Bring a list of your current medicines to your exam. (Include vitamins, minerals and over-the-counter medicines.) Leave your valuables at home.  Plan to have someone drive you home afterward.  Stop taking the following medicines (but talk to your doctor first):   If you take blood thinners, you may need to stop taking them a few days before treatment. Talk to your doctor before stopping these medicines.Stop taking  Coumadin (warfarin) 3 days before treatment. Restart the day after treatment.   If you take Plavix, Ticlid, Pletal or Persantine, please ask your doctor if you should stop these medicines. You may need extra tests on the morning of your scan.   If you take Xarelto (Rivaroxaban), you may need to stop taking it 24 hours before treatment. Talk to your doctor before stopping this medicine. Restart the day after treatment.  You may take your other medicines as normal.  Stop all food and drink (including water) 3 hours before your test or treatment.  Please tell the doctor:   If you are allergic to X-ray dye (contrast fluid).   If you may be pregnant.  Injections take about 30 to 45 minutes. Most people spend up to 2 hours in the clinic or hospital.  Please call the Imaging Department at your exam site with any questions.            Dec 20, 2017 10:30 AM CST   NEW WOUND NURSE VISIT with Pj Danielle RN   [facility] Wound Ostomy ([facility])    [address] [place] [address] [phone]            Jan 08, 2018 10:30 AM CST   (Arrive by 10:15 AM)   Return Visit with Archana Green PA-C   [facility] Urology and Inst for Prostate and Urologic Cancers ([facility])    [address] [place] [address] [phone]            Jan 12, 2018  8:00 AM CST   (Arrive by 7:45 AM)   Return Visit with Pj Cunha MD   [facility] Rheumatology ([facility])    [address] [place] [address] [phone]            Eric 15, 2018  8:15 AM CST   PET ONCOLOGY WHOLE BODY with UUPET1   [facility] [place] PET CT ([facility] [place])    [address] [facility] [address] [phone]           Tell your doctor:   If there is any chance you may be pregnant or if you are breastfeeding.   If you have problems lying in small spaces  (claustrophobia). If you do, your doctor may give you medicine to help you relax. If you have diabetes:   Have your exam early in the morning. Your blood glucose will go up as the day goes by.   Your glucose level must be 180 or less at the start of the exam. Please take any medicines you need to ensure this blood glucose level. 24 hours before your scan: Don t do any heavy exercise. (No jogging, aerobics or other workouts.) Exercise will make your pictures less accurate. 6 hours before your scan:   Stop all food and liquids (except water).   Do not chew gum or suck on mints.   If you need to take medicine with food, you may take it with a few crackers.  Please call your Imaging Department at your exam site with any questions.              Future tests that were ordered for you today     Open Standing Orders        Priority Remaining Interval Expires Ordered    Transfuse platelets unit Routine 99/100 TRANSFUSE 1 DOSE  12/8/2017    Red blood cell prepare order unit Routine 99/100 CONDITIONAL (SPECIFY) BLOOD  12/7/2017    Platelets prepare order unit Routine 99/100 CONDITIONAL (SPECIFY) BLOOD  12/7/2017            Who to contact     Please call your clinic at [phone] to:    Ask questions about your health    Make or cancel appointments    Discuss your medicines    Learn about your test results    Speak to your doctor   If you have compliments or concerns about an experience at your clinic, or if you wish to file a complaint, please contact [facility] Patient Relations at [phone] or email us at [email or web address]         Additional Information About Your Visit        Medy[employer] Information     [employer] gives you secure access to your electronic health record. If you see a primary care provider, you can also send messages to your care team and make appointments. If you have questions, please call your primary care clinic.  If you do not have a primary care provider, please  call [phone] and they will assist you.      [employer] is an electronic gateway that provides easy, online access to your medical records. With [employer], you can request a clinic appointment, read your test results, renew a prescription or communicate with your care team.     To access your existing account, please contact your [facility] or call [phone] for assistance.        Care EveryWhere ID     This is your Care EveryWhere ID. This could be used by other organizations to access your [facility] medical records  [id number]         Blood Pressure from Last 3 Encounters:   12/08/17 108/71   12/08/17 104/69   12/05/17 (!) 135/96    Weight from Last 3 Encounters:   12/08/17 55.1 kg (121 lb 8 oz)   12/05/17 57 kg (125 lb 11.2 oz)   11/29/17 55.1 kg (121 lb 6.4 oz)              Today, you had the following     No orders found for display       Primary Care Provider Office Phone # Fax #    Com[facility]frt Sabillon [phone] [address] [facility] [address]        Equal Access to Services     [facility]: Hadii aad ku hadasho Sooliviaali, waaxda luqadaha, qaybta kaalmada adecindy, durga hermosillo . So [facility] [phone].    ATENCIÓN: Si habla español, tiene a sarmiento disposición servicios gratuitos de asistencia lingüística. Llame al [phone].    We comply with applicable federal civil rights laws and [place] laws. We do not discriminate on the basis of race, color, national origin, age, disability, sex, sexual orientation, or gender identity.            Thank you!     Thank you for choosing [facility] OSTOM[facility]  for your care. Our goal is always to provide you with excellent care. Hearing back from our patients is one way we can continue to improve our services. Please take a few minutes to complete the written survey that you may receive in the mail after your visit with us. Thank you!             Your Updated Medication List - Protect  others around you: Learn how to safely use, store and throw away your medicines at www.disposemymeds.org.          This list is accurate as of: 12/8/17  2:41 PM.  Always use your most recent med list.                   Brand Name Dispense Instructions for use Diagnosis    acetaminophen 325 MG tablet    TYLENOL     Take 2 tablets (650 mg) by mouth every 4 hours as needed for mild pain, fever or headaches    Diffuse large B-cell lymphoma of extranodal site (H)       acyclovir 400 MG tablet    ZOVIRAX    60 tablet    Take 1 tablet (400 mg) by mouth 2 times daily    Diffuse large B-cell lymphoma, unspecified body region (H)       ascorbic acid 500 MG Tabs     30 tablet    Take 1 tablet (500 mg) by mouth daily    Diffuse large B-cell lymphoma, unspecified body region (H)       atenolol 25 MG tablet    TENORMIN    60 tablet    Take 1 tablet (25 mg) by mouth 2 times daily    Atrial tachycardia (H)       BENADRYL ALLERGY PO      Take 50 mg by mouth        calcium polycarbophil 625 MG tablet    FIBERCON     Take 1 tablet by mouth 2 times daily        calcium-vitamin D 600-400 MG-UNIT per tablet    CALTRATE    60 tablet    Take 2 tablets by mouth every morning    Diffuse large B-cell lymphoma, unspecified body region (H)       cetirizine 10 MG tablet    zyrTEC    30 tablet    Take 1 tablet (10 mg) by mouth daily    Hypophosphatemia       digoxin 125 MCG tablet    LANOXIN    30 tablet    Take 1 tablet (125 mcg) by mouth daily    Atrial tachycardia (H)       enoxaparin 40 MG/0.4ML injection    LOVENOX    30 Syringe    Inject 0.4 mLs (40 mg) Subcutaneous daily    VTE (venous thromboembolism), Paroxysmal atrial fibrillation (H)       fluconazole 100 MG tablet    DIFLUCAN    30 tablet    Take 1 tablet (100 mg) by mouth daily    Diffuse large B-cell lymphoma, unspecified body region (H)       gabapentin 100 MG capsule    NEURONTIN    180 capsule    Take 2 capsules (200 mg) by mouth 3 times daily    Critical illness myopathy        HYDROCORTISONE PO      Take 100 mg by mouth IV        levofloxacin 250 MG tablet    LEVAQUIN    30 tablet    Take 1 tablet (250 mg) by mouth daily    Chemotherapy-induced neutropenia (H)       lidocaine visc 2% 2.5mL/5mL & maalox/mylanta w/ simeth 2.5mL/5mL & diphenhydrAMINE 5mg/5mL Susp suspension    [facility] Mouthwash [facility] [place]     Swish and swallow 10 mLs in mouth 2 times daily        multivitamin, therapeutic with minerals Tabs tablet     30 each    Take 1 tablet by mouth daily    Diffuse large B-cell lymphoma, unspecified body region (H)       ondansetron 8 MG ODT tab    ZOFRAN-ODT    20 tablet    Take 1 tablet (8 mg) by mouth every 8 hours as needed    Diffuse large B-cell lymphoma of extranodal site (H)       potassium & sodium phosphates 280-160-250 MG Packet    NEUTRA-PHOS    60 packet    Take 1 packet by mouth 2 times daily    Hypophosphatemia       potassium chloride 20 MEQ Packet    KLOR-CON    15 tablet    Take 10 mEq by mouth daily    Hypokalemia       predniSONE 5 MG tablet    DELTASONE    30 tablet    Take 1 tablet (5 mg) by mouth daily    Diffuse large B-cell lymphoma, unspecified body region (H)       tolterodine 4 MG 24 hr capsule    DETROL LA    30 capsule    Take 1 capsule (4 mg) by mouth daily    Urinary urgency       traMADol 50 MG tablet    ULTRAM    30 tablet    Take 1 tablet (50 mg) by mouth every 6 hours as needed for moderate pain    Diffuse large B-cell lymphoma of extranodal site (H)

## 2017-12-11 ENCOUNTER — PRE VISIT (OUTPATIENT)
Dept: UROLOGY | Facility: CLINIC | Age: 57
End: 2017-12-11

## 2017-12-11 LAB
BLD PROD TYP BPU: NORMAL
NUM BPU REQUESTED: 1

## 2017-12-11 RX ORDER — ACETAMINOPHEN 325 MG/1
325 TABLET ORAL ONCE
Status: CANCELLED
Start: 2017-12-11 | End: 2017-12-11

## 2017-12-11 RX ORDER — DIPHENHYDRAMINE HCL 25 MG
25 CAPSULE ORAL
Status: CANCELLED
Start: 2017-12-11

## 2017-12-12 ENCOUNTER — INFUSION THERAPY VISIT (OUTPATIENT)
Dept: ONCOLOGY | Facility: CLINIC | Age: 57
End: 2017-12-12
Attending: INTERNAL MEDICINE
Payer: COMMERCIAL

## 2017-12-12 ENCOUNTER — APPOINTMENT (OUTPATIENT)
Dept: LAB | Facility: CLINIC | Age: 57
End: 2017-12-12
Attending: INTERNAL MEDICINE
Payer: COMMERCIAL

## 2017-12-12 VITALS
RESPIRATION RATE: 14 BRPM | TEMPERATURE: 98.2 F | BODY MASS INDEX: 25.36 KG/M2 | DIASTOLIC BLOOD PRESSURE: 69 MMHG | HEART RATE: 70 BPM | OXYGEN SATURATION: 98 % | WEIGHT: 121.3 LBS | SYSTOLIC BLOOD PRESSURE: 127 MMHG

## 2017-12-12 DIAGNOSIS — C83.398 DIFFUSE LARGE B-CELL LYMPHOMA OF EXTRANODAL SITE: Primary | ICD-10-CM

## 2017-12-12 LAB
ABO + RH BLD: NORMAL
ABO + RH BLD: NORMAL
ANISOCYTOSIS BLD QL SMEAR: ABNORMAL
BASOPHILS # BLD AUTO: 0 10E9/L (ref 0–0.2)
BASOPHILS NFR BLD AUTO: 0 %
BLD GP AB SCN SERPL QL: NORMAL
BLD PROD TYP BPU: NORMAL
BLD PROD TYP BPU: NORMAL
BLD UNIT ID BPU: 0
BLD UNIT ID BPU: 0
BLOOD BANK CMNT PATIENT-IMP: NORMAL
BLOOD PRODUCT CODE: NORMAL
BLOOD PRODUCT CODE: NORMAL
BPU ID: NORMAL
BPU ID: NORMAL
DACRYOCYTES BLD QL SMEAR: SLIGHT
DIFFERENTIAL METHOD BLD: ABNORMAL
EOSINOPHIL # BLD AUTO: 0 10E9/L (ref 0–0.7)
EOSINOPHIL NFR BLD AUTO: 0 %
ERYTHROCYTE [DISTWIDTH] IN BLOOD BY AUTOMATED COUNT: 14.7 % (ref 10–15)
HCT VFR BLD AUTO: 27.8 % (ref 35–47)
HGB BLD-MCNC: 9.3 G/DL (ref 11.7–15.7)
LYMPHOCYTES # BLD AUTO: 0.5 10E9/L (ref 0.8–5.3)
LYMPHOCYTES NFR BLD AUTO: 7.9 %
MACROCYTES BLD QL SMEAR: PRESENT
MCH RBC QN AUTO: 39.2 PG (ref 26.5–33)
MCHC RBC AUTO-ENTMCNC: 33.5 G/DL (ref 31.5–36.5)
MCV RBC AUTO: 117 FL (ref 78–100)
MONOCYTES # BLD AUTO: 0.2 10E9/L (ref 0–1.3)
MONOCYTES NFR BLD AUTO: 2.6 %
MYELOCYTES # BLD: 0.1 10E9/L
MYELOCYTES NFR BLD MANUAL: 1.8 %
NEUTROPHILS # BLD AUTO: 5.3 10E9/L (ref 1.6–8.3)
NEUTROPHILS NFR BLD AUTO: 87.7 %
PLATELET # BLD AUTO: 61 10E9/L (ref 150–450)
POIKILOCYTOSIS BLD QL SMEAR: ABNORMAL
POLYCHROMASIA BLD QL SMEAR: ABNORMAL
RBC # BLD AUTO: 2.37 10E12/L (ref 3.8–5.2)
SPECIMEN EXP DATE BLD: NORMAL
TRANSFUSION STATUS PATIENT QL: NORMAL
WBC # BLD AUTO: 6.1 10E9/L (ref 4–11)

## 2017-12-12 PROCEDURE — 86850 RBC ANTIBODY SCREEN: CPT | Performed by: INTERNAL MEDICINE

## 2017-12-12 PROCEDURE — 86900 BLOOD TYPING SEROLOGIC ABO: CPT | Performed by: INTERNAL MEDICINE

## 2017-12-12 PROCEDURE — 36415 COLL VENOUS BLD VENIPUNCTURE: CPT

## 2017-12-12 PROCEDURE — 85025 COMPLETE CBC W/AUTO DIFF WBC: CPT | Performed by: PHYSICIAN ASSISTANT

## 2017-12-12 PROCEDURE — 40000141 ZZH STATISTIC PERIPHERAL IV START W/O US GUIDANCE: Mod: ZF

## 2017-12-12 PROCEDURE — 86901 BLOOD TYPING SEROLOGIC RH(D): CPT | Performed by: INTERNAL MEDICINE

## 2017-12-12 ASSESSMENT — PAIN SCALES - GENERAL: PAINLEVEL: NO PAIN (0)

## 2017-12-12 NOTE — PROGRESS NOTES
Infusion Nursing Note:  Amelia Michel presents today for possible transfusion  Patient seen by provider today: No   present during visit today: Not Applicable.    Note: Amelia has been doing well at home.  Energy level varies.  Continues with bruises but no bleeding episodes.  Recently to wound clinic of old PIV site left AC.      Intravenous Access:  Peripheral IV placed.    Treatment Conditions:  Lab Results   Component Value Date    HGB 9.3 12/12/2017     Lab Results   Component Value Date    WBC 6.1 12/12/2017      Lab Results   Component Value Date    ANEU 5.3 12/12/2017     Lab Results   Component Value Date    PLT 61 12/12/2017      Lab Results   Component Value Date     11/29/2017                   Lab Results   Component Value Date    POTASSIUM 3.9 11/29/2017           Lab Results   Component Value Date    MAG 1.7 09/11/2017            Lab Results   Component Value Date    CR 0.57 11/29/2017                   Lab Results   Component Value Date    VIKTORIYA 9.7 11/29/2017                Lab Results   Component Value Date    BILITOTAL 0.3 11/29/2017           Lab Results   Component Value Date    ALBUMIN 3.4 11/29/2017                    Lab Results   Component Value Date    ALT 52 11/29/2017           Lab Results   Component Value Date    AST 50 11/29/2017     Results reviewed, labs did NOT meet treatment parameters: for blood products.          Post Infusion Assessment:  No evidence of extravasations.  Access discontinued per protocol.    Discharge Plan:   Patient declined prescription refills.  Discharge instructions reviewed with: Patient and Family.  Patient and/or family verbalized understanding of discharge instructions and all questions answered.  AVS to patient via UserZoomT.  Patient will return 12/18 for next appointment.   Patient discharged in stable condition accompanied by: self and .  Departure Mode: Ambulatory and Wheelchair.    Lizzy Arora RN

## 2017-12-12 NOTE — NURSING NOTE
IV started and labs drawn by [facility], vitals obtained without complication.    Patient checked into next appt.    See flow sheets,    Emerita Zavaleta CMA (AAMA)

## 2017-12-12 NOTE — PATIENT INSTRUCTIONS
December 2017 Sunday Monday Tuesday Wednesday Thursday Friday Saturday                            1     2       3     4     5     [facility] DRAW   11:00 AM   (15 min.)    [facility]     [facility] ONC INFUSION 120   11:30 AM   (120 min.)    ONCOLOGY INFUSION   [facility] 6     7     8     [facility] DRAW    9:30 AM   (15 min.)    [facility] DRAW   [facility] Lab Draw     [facility] ONC INFUSION 120   10:00 AM   (120 min.)    ONCOLOGY INFUSION   [facility]     [facility] NEW WOUND NURSE   12:30 PM   (60 min.)   Pj Danielle, RN   [facility] Wound Ostomy 9       10     11     12     P [facility] DRAW    8:45 AM   (15 min.)    [facility]RAW   [facility]     [facility] ONC INFUSION 120    9:30 AM   (120 min.)    ONCOLOGY INFUSION   [facility] 13     14     15     16       17     18     [facility] LUMBAR PUNCTURE    8:25 AM   (100 min.)   Kelly Mcginnis PA-C   [facility]     XR LP INTRATHECAL CHEMO ADMIN    9:00 AM   (60 min.)   UUXR4   [facility],  Radiology 19     20     P NEW WOUND NURSE   10:30 AM   (60 min.)   Pj Danielle, RN   [facility] Wound Ostomy 21     22     23       24     25     26     27     28     29     30       31 January 2018 Sunday Monday Tuesday Wednesday Thursday Friday Saturday        1     2     3     4     5     6       7     8     [facility] RETURN   10:15 AM   (30 min.)   Archana Green PA-C   [facility] Urology and Inst for Prostate and Urologic Cancers 9     10     11     12     [facility] RETURN    7:45 AM   (30 min.)   Pj Cunha MD   [facility] Rheumatology 13       14     15     PET ONCOLOGY WHOLE BODY    8:15 AM   (45 min.)   UUPET1   [facility] PET CT 16     17     18     [facility] ONC RETURN    3:30 PM   (30 min.)   Lenore Rodriguez MD   [facility] Blood and Marrow Transplant 19     20        21     22     23     24     25     26     27       28     29     30     31     [facility] RETURN ARRHYTHMIA    9:45 AM   (30 min.)   Juan Eaton MD   [facility]                             Lab Results:  Recent Results (from the past 12 hour(s))   Platelets prepare order unit    Collection Time: 12/12/17  1:00 AM   Result Value Ref Range    Blood Component Type PLT Pheresis     Units Ordered 1    Blood component    Collection Time: 12/12/17  1:00 AM   Result Value Ref Range    Unit Number [id number]     Blood Component Type PlateletPheresis LeukoReduced Irradiated     Division Number 00     Status of Unit No longer available 12/12/2017 1015     Blood Product Code [id number]     Unit Status RET    Blood component    Collection Time: 12/12/17  1:00 AM   Result Value Ref Range    Unit Number [id number]     Blood Component Type PlateletPheresis,LeukoRed Irrad (Part 2)     Division Number 00     Status of Unit Ready for patient 12/12/2017 1045     Blood Product Code [id number]     Unit Status ALEJANDRA    *CBC with platelets differential    Collection Time: 12/12/17  9:02 AM   Result Value Ref Range    WBC 6.1 4.0 - 11.0 10e9/L    RBC Count 2.37 (L) 3.8 - 5.2 10e12/L    Hemoglobin 9.3 (L) 11.7 - 15.7 g/dL    Hematocrit 27.8 (L) 35.0 - 47.0 %     (H) 78 - 100 fl    MCH 39.2 (H) 26.5 - 33.0 pg    MCHC 33.5 31.5 - 36.5 g/dL    RDW 14.7 10.0 - 15.0 %    Platelet Count 61 (L) 150 - 450 10e9/L    Diff Method Manual Differential     % Neutrophils 87.7 %    % Lymphocytes 7.9 %    % Monocytes 2.6 %    % Eosinophils 0.0 %    % Basophils 0.0 %    % Myelocytes 1.8 %    Absolute Neutrophil 5.3 1.6 - 8.3 10e9/L    Absolute Lymphocytes 0.5 (L) 0.8 - 5.3 10e9/L    Absolute Monocytes 0.2 0.0 - 1.3 10e9/L    Absolute Eosinophils 0.0 0.0 - 0.7 10e9/L    Absolute Basophils 0.0 0.0 - 0.2 10e9/L    Absolute Myelocytes 0.1 (H) 0 10e9/L    Anisocytosis Moderate     Poikilocytosis Moderate     Polychromasia Moderate     Teardrop Cells  Slight     Macrocytes Present    ABO/Rh type and screen    Collection Time: 12/12/17  9:02 AM   Result Value Ref Range    ABO O     RH(D) Neg     Antibody Screen Neg     Test Valid Only At          [facility]    Specimen Expires 12/15/2017

## 2017-12-15 ENCOUNTER — TELEPHONE (OUTPATIENT)
Dept: INTERVENTIONAL RADIOLOGY/VASCULAR | Facility: CLINIC | Age: 57
End: 2017-12-15

## 2017-12-18 ENCOUNTER — HOSPITAL ENCOUNTER (OUTPATIENT)
Facility: CLINIC | Age: 57
Discharge: HOME | End: 2017-12-18
Attending: INTERNAL MEDICINE | Admitting: INTERNAL MEDICINE
Payer: COMMERCIAL

## 2017-12-18 ENCOUNTER — HOSPITAL ENCOUNTER (OUTPATIENT)
Dept: INTERVENTIONAL RADIOLOGY/VASCULAR | Facility: CLINIC | Age: 57
End: 2017-12-18
Attending: PHYSICIAN ASSISTANT
Payer: COMMERCIAL

## 2017-12-18 ENCOUNTER — APPOINTMENT (OUTPATIENT)
Dept: MEDSURG UNIT | Facility: CLINIC | Age: 57
End: 2017-12-18
Attending: PHYSICIAN ASSISTANT
Payer: COMMERCIAL

## 2017-12-18 ENCOUNTER — OFFICE VISIT (OUTPATIENT)
Dept: ONCOLOGY | Facility: CLINIC | Age: 57
End: 2017-12-18
Attending: PHYSICIAN ASSISTANT
Payer: COMMERCIAL

## 2017-12-18 VITALS
SYSTOLIC BLOOD PRESSURE: 136 MMHG | RESPIRATION RATE: 16 BRPM | TEMPERATURE: 98 F | DIASTOLIC BLOOD PRESSURE: 63 MMHG | OXYGEN SATURATION: 99 %

## 2017-12-18 DIAGNOSIS — C83.30 DIFFUSE LARGE B-CELL LYMPHOMA, UNSPECIFIED BODY REGION (H): ICD-10-CM

## 2017-12-18 DIAGNOSIS — C83.398 DIFFUSE LARGE B-CELL LYMPHOMA OF EXTRANODAL SITE: Primary | ICD-10-CM

## 2017-12-18 DIAGNOSIS — C83.398 DIFFUSE LARGE B-CELL LYMPHOMA OF EXTRANODAL SITE: ICD-10-CM

## 2017-12-18 DIAGNOSIS — I47.19 ATRIAL TACHYCARDIA (H): ICD-10-CM

## 2017-12-18 LAB
ERYTHROCYTE [DISTWIDTH] IN BLOOD BY AUTOMATED COUNT: 16.5 % (ref 10–15)
GLUCOSE CSF-MCNC: 44 MG/DL (ref 40–70)
HCT VFR BLD AUTO: 30.4 % (ref 35–47)
HGB BLD-MCNC: 9.9 G/DL (ref 11.7–15.7)
INR BLD: 1.1 (ref 0.86–1.14)
MCH RBC QN AUTO: 39.1 PG (ref 26.5–33)
MCHC RBC AUTO-ENTMCNC: 32.6 G/DL (ref 31.5–36.5)
MCV RBC AUTO: 120 FL (ref 78–100)
PLATELET # BLD AUTO: 72 10E9/L (ref 150–450)
PROT CSF-MCNC: 61 MG/DL (ref 15–60)
RBC # BLD AUTO: 2.53 10E12/L (ref 3.8–5.2)
WBC # BLD AUTO: 7.2 10E9/L (ref 4–11)

## 2017-12-18 PROCEDURE — 88184 FLOWCYTOMETRY/ TC 1 MARKER: CPT | Performed by: PHYSICIAN ASSISTANT

## 2017-12-18 PROCEDURE — 25000128 H RX IP 250 OP 636: Performed by: PHYSICIAN ASSISTANT

## 2017-12-18 PROCEDURE — 25000125 ZZHC RX 250: Performed by: RADIOLOGY

## 2017-12-18 PROCEDURE — 85610 PROTHROMBIN TIME: CPT | Mod: QW

## 2017-12-18 PROCEDURE — 62323 NJX INTERLAMINAR LMBR/SAC: CPT

## 2017-12-18 PROCEDURE — 89050 BODY FLUID CELL COUNT: CPT | Performed by: PHYSICIAN ASSISTANT

## 2017-12-18 PROCEDURE — 84157 ASSAY OF PROTEIN OTHER: CPT | Performed by: PHYSICIAN ASSISTANT

## 2017-12-18 PROCEDURE — 96450 CHEMOTHERAPY INTO CNS: CPT | Performed by: PHYSICIAN ASSISTANT

## 2017-12-18 PROCEDURE — 40000166 ZZH STATISTIC PP CARE STAGE 1

## 2017-12-18 PROCEDURE — 82945 GLUCOSE OTHER FLUID: CPT | Performed by: PHYSICIAN ASSISTANT

## 2017-12-18 PROCEDURE — 88185 FLOWCYTOMETRY/TC ADD-ON: CPT | Performed by: PHYSICIAN ASSISTANT

## 2017-12-18 PROCEDURE — 40001004 ZZHCL STATISTIC FLOW INT 9-15 ABY TC 88188: Performed by: PHYSICIAN ASSISTANT

## 2017-12-18 PROCEDURE — 85027 COMPLETE CBC AUTOMATED: CPT | Performed by: STUDENT IN AN ORGANIZED HEALTH CARE EDUCATION/TRAINING PROGRAM

## 2017-12-18 RX ORDER — FLUCONAZOLE 100 MG/1
100 TABLET ORAL DAILY
Qty: 30 TABLET | Refills: 1 | Status: SHIPPED | OUTPATIENT
Start: 2017-12-18 | End: 2018-01-18

## 2017-12-18 RX ORDER — ACYCLOVIR 400 MG/1
400 TABLET ORAL 2 TIMES DAILY
Qty: 60 TABLET | Refills: 1 | Status: SHIPPED | OUTPATIENT
Start: 2017-12-18 | End: 2018-01-18

## 2017-12-18 RX ORDER — ATENOLOL 25 MG/1
25 TABLET ORAL 2 TIMES DAILY
Qty: 60 TABLET | Refills: 1 | Status: SHIPPED | OUTPATIENT
Start: 2017-12-18 | End: 2018-02-19

## 2017-12-18 RX ORDER — LIDOCAINE HYDROCHLORIDE 10 MG/ML
30 INJECTION, SOLUTION EPIDURAL; INFILTRATION; INTRACAUDAL; PERINEURAL ONCE
Status: COMPLETED | OUTPATIENT
Start: 2017-12-18 | End: 2017-12-18

## 2017-12-18 RX ADMIN — LIDOCAINE HYDROCHLORIDE 5 ML: 10 INJECTION, SOLUTION EPIDURAL; INFILTRATION; INTRACAUDAL; PERINEURAL at 10:34

## 2017-12-18 RX ADMIN — CYTARABINE: 100 INJECTION INTRATHECAL; INTRAVENOUS; SUBCUTANEOUS at 10:49

## 2017-12-18 NOTE — PROCEDURES
[facility] [place]     Neuroradiology Procedure Note     Lumbar Puncture Under Fluoroscopic Guidance:      12/18/2017 11:00 AM    Performed by: Zev Encarnacion MD  Authorized by: MD Zev Baca MD    Indications: IT chemo    Consent given by: Patient who states understanding of the procedure being performed after discussing the risks, benefits and alternatives.    Prior to the start of the procedure and with procedural staff participation, I verbally confirmed the patient s identity using two indicators, relevant allergies, that the procedure was appropriate and matched the consent or emergent situation, and that the correct equipment/implants were available. Immediately prior to starting the procedure I conducted the Time Out with the procedural staff and re-confirmed the patient s name, procedure, and site/side. (The Joint Commission universal protocol was followed.) Yes    Procedure:    Under sterile conditions the patient was positioned lying prone. Using fluoroscopy, needle entrance side was defined.  Betadine solution and sterile drapes were utilized.  Local anesthetic at the site: 5 ml of lidocaine 1% without epinephrine.    A 22 gauge 5 inch spinal needle was inserted at the L3-L4 interspace.  Opening Pressurewas not checked.  A total of 12mL of clear and colorless spinal fluid was obtained and sent to the laboratory.  Intrathecal chemotherapeutic injected later.  After the needle was removed, a bandaid and pressure were applied and the patient was instructed to stay horizontal until the results were back.    Complications:  None  Patient tolerance: Patient tolerated the procedure well with no immediate complications.    Condition: Stable Patient is transferred to Observational unit for post procedure observation.    Can Ozutemiz 12/18/2017 11:00 AM

## 2017-12-18 NOTE — PROGRESS NOTES
0825 Pt on 2a prepped and ready for LP with chemo. INR 1.1 per istat. H&P current. Family at . Pt ready for consent.

## 2017-12-18 NOTE — LETTER
12/18/2017      RE: Amelia Michel  [address]  [facility] [address]       ONC Adult Lumbar Puncture and Intrathecal Chemotherapy Administration Procedure Note    Dec 18, 2017    Procedure: Lumbar Puncture to obtain CSF and give intrathecal chemotherapy    Diagnosis: DLBCL      Informed Consent was performed in INTERVENTIONAL RADIOLOGY: Procedure, benefits, risks, and alternatives explained to the patient who voiced understanding of the information and agreed to proceed with lumbar puncture. Risks include bleeding, headache, and or infection.     Procedure: completed by interventional radiology    I gave patient IT cytarabine 100 mg in 6 mL volume, preservative free.  It was pushed intrathecally in IR for 3 minutes without complication.     Disposition: Patient was sent to recovery in 2A.   Avoid soaking in a tub tonight.  Call if develops headaches, fevers and or chills.       Kelly Mcginnis PA-C  [facility]  [address] [place] [address] [phone]

## 2017-12-18 NOTE — IP AVS SNAPSHOT
[address] [address]                                       After Visit Summary   12/18/2017    Amelia Michel    MRN: [id number]           After Visit Summary Signature Page     I have received my discharge instructions, and my questions have been answered. I have discussed any challenges I see with this plan with the nurse or doctor.    ..........................................................................................................................................  Patient/Patient Representative Signature      ..........................................................................................................................................  Patient Representative Print Name and Relationship to Patient    ..................................................               ................................................  Date                                            Time    ..........................................................................................................................................  Reviewed by Signature/Title    ...................................................              ..............................................  Date                                                            Time

## 2017-12-18 NOTE — MR AVS SNAPSHOT
After Visit Summary   12/18/2017    Amelia Michel    MRN: [id number]           Patient Information     Date Of Birth          1960        Visit Information        Provider Department      12/18/2017 8:40 AM Kelly Mcginnis PA-C [facility]        Today's Diagnoses     Diffuse large B-cell lymphoma of extranodal site (H)    -  1       Follow-ups after your visit        Your next 10 appointments already scheduled     Dec 20, 2017 10:30 AM CST   NEW WOUND NURSE VISIT with Pj Danielle RN   [facility] Wound Ostomy ([facility] [place])    [address] [facility] [address] [phone]            Jan 08, 2018 10:30 AM CST   (Arrive by 10:15 AM)   Return Visit with Archana Green PA-C   [facility] Urology and Inst for Prostate and Urologic Cancers ([facility])    [address] [facility] [id number] [phone]            Jan 12, 2018  8:00 AM CST   (Arrive by 7:45 AM)   Return Visit with Pj Cunha MD   [facility] Rheumatology ([facility])    [address]  [address] [facility] [id number] [phone]            Eric 15, 2018  8:15 AM CST   PET ONCOLOGY WHOLE BODY with UUPET1   [facility] [place] PET CT ([facility] [place])    [address] [address] [phone]           Tell your doctor:   If there is any chance you may be pregnant or if you are breastfeeding.   If you have problems lying in small spaces (claustrophobia). If you do, your doctor may give you medicine to help you relax. If you have diabetes:   Have your exam early in the morning. Your blood glucose will go up as the day goes by.   Your glucose level must be 180 or less at the start of the exam. Please take any medicines you need to ensure this blood glucose level. 24 hours before your scan: Don t do any heavy  exercise. (No jogging, aerobics or other workouts.) Exercise will make your pictures less accurate. 6 hours before your scan:   Stop all food and liquids (except water).   Do not chew gum or suck on mints.   If you need to take medicine with food, you may take it with a few crackers.  Please call your Imaging Department at your exam site with any questions.            Jan 18, 2018  3:30 PM CST   RETURN ONC with Lenore Rodriguez MD   [facility] Blood and Marrow Transplant ([facility])    [address]  [address]  [facility] [address] [phone]            Jan 31, 2018 10:00 AM CST   (Arrive by 9:45 AM)   RETURN ARRHYTHMIA with Juan Eaton MD   [facility] Heart Care ([facility])    [address]  3rd Floor  [facility] [address] [phone]              Who to contact     If you have questions or need follow up information about today's clinic visit or your schedule please contact [facility] directly at [phone].  Normal or non-critical lab and imaging results will be communicated to you by [employer], letter or phone within 4 business days after the clinic has received the results. If you do not hear from us within 7 days, please contact the clinic through "[employer]t or phone. If you have a critical or abnormal lab result, we will notify you by phone as soon as possible.  Submit refill requests through [employer] or call your pharmacy and they will forward the refill request to us. Please allow 3 business days for your refill to be completed.          Additional Information About Your Visit        [employer] Information     [employer] gives you secure access to your electronic health record. If you see a primary care provider, you can also send messages to your care team and make appointments. If you have questions, please call your primary care clinic.  If you do not have a primary care provider, please call [phone] and  they will assist you.        Care EveryWhere ID     This is your Care EveryWhere ID. This could be used by other organizations to access your [place] medical records  [id number]         Blood Pressure from Last 3 Encounters:   12/18/17 129/64   12/12/17 127/69   12/08/17 108/71    Weight from Last 3 Encounters:   12/12/17 55 kg (121 lb 4.8 oz)   12/08/17 55.1 kg (121 lb 8 oz)   12/05/17 57 kg (125 lb 11.2 oz)              Today, you had the following     No orders found for display         Today's Medication Changes      Notice     This visit is during an admission. Changes to the med list made in this visit will be reflected in the After Visit Summary of the admission.             Primary Care Provider Office Phone # Fax #    Comfort Sabillon [phone] [address] [facility] [address]        Equal Access to Services     [facility]: Hadii laurita mcguire hadasho Soomaali, waaxda luqadaha, qaybta kaalmada adeegyada, durga hermosillo . So [facility] [phone].    ATENCIÓN: Si habla español, tiene a sarmiento disposición servicios gratuitos de asistencia lingüística. [facility] [phone].    We comply with applicable federal civil rights laws and [place] laws. We do not discriminate on the basis of race, color, national origin, age, disability, sex, sexual orientation, or gender identity.            Thank you!     Thank you for choosing [facility]  for your care. Our goal is always to provide you with excellent care. Hearing back from our patients is one way we can continue to improve our services. Please take a few minutes to complete the written survey that you may receive in the mail after your visit with us. Thank you!             Your Updated Medication List - Protect others around you: Learn how to safely use, store and throw away your medicines at www.disposemymeds.org.      Notice     This visit is during an admission. Changes to the med list made  in this visit will be reflected in the After Visit Summary of the admission.

## 2017-12-18 NOTE — IP AVS SNAPSHOT
MRN:[id number]                      After Visit Summary   12/18/2017    Amelia Michel    MRN: [id number]           Visit Information        Department      12/18/2017  7:35 AM [address] [facility]          Review of your medicines      UNREVIEWED medicines. Ask your doctor about these medicines        Dose / Directions    acetaminophen 325 MG tablet   Commonly known as:  TYLENOL   Used for:  Diffuse large B-cell lymphoma of extranodal site (H)        Dose:  650 mg   Take 2 tablets (650 mg) by mouth every 4 hours as needed for mild pain, fever or headaches   Refills:  0       acyclovir 400 MG tablet   Commonly known as:  ZOVIRAX   Indication:  Medication Treatment to Prevent Cytomegalovirus Disease   Used for:  Diffuse large B-cell lymphoma, unspecified body region (H)        Dose:  400 mg   Take 1 tablet (400 mg) by mouth 2 times daily   Quantity:  60 tablet   Refills:  1       ascorbic acid 500 MG Tabs   Used for:  Diffuse large B-cell lymphoma, unspecified body region (H)        Dose:  500 mg   Take 1 tablet (500 mg) by mouth daily   Quantity:  30 tablet   Refills:  0       atenolol 25 MG tablet   Commonly known as:  TENORMIN   Used for:  Atrial tachycardia (H)        Dose:  25 mg   Take 1 tablet (25 mg) by mouth 2 times daily   Quantity:  60 tablet   Refills:  1       BENADRYL ALLERGY PO        Dose:  50 mg   Take 50 mg by mouth   Refills:  0       calcium polycarbophil 625 MG tablet   Commonly known as:  FIBERCON   Indication:  loose stool        Dose:  1 tablet   Take 1 tablet by mouth 2 times daily   Refills:  0       calcium-vitamin D 600-400 MG-UNIT per tablet   Commonly known as:  CALTRATE   Used for:  Diffuse large B-cell lymphoma, unspecified body region (H)        Dose:  2 tablet   Take 2 tablets by mouth every morning   Quantity:  60 tablet   Refills:  0       digoxin 125 MCG tablet   Commonly known as:  LANOXIN   Used for:  Atrial tachycardia (H)        Dose:  125 mcg   Take 1  tablet (125 mcg) by mouth daily   Quantity:  30 tablet   Refills:  3       enoxaparin 40 MG/0.4ML injection   Commonly known as:  LOVENOX   Used for:  VTE (venous thromboembolism), Paroxysmal atrial fibrillation (H)        Dose:  40 mg   Inject 0.4 mLs (40 mg) Subcutaneous daily   Quantity:  30 Syringe   Refills:  3       fluconazole 100 MG tablet   Commonly known as:  DIFLUCAN   Used for:  Diffuse large B-cell lymphoma, unspecified body region (H)        Dose:  100 mg   Take 1 tablet (100 mg) by mouth daily   Quantity:  30 tablet   Refills:  1       gabapentin 100 MG capsule   Commonly known as:  NEURONTIN   Used for:  Critical illness myopathy        Dose:  200 mg   Take 2 capsules (200 mg) by mouth 3 times daily   Quantity:  180 capsule   Refills:  3       HYDROCORTISONE PO        Dose:  100 mg   Take 100 mg by mouth IV   Refills:  0       lidocaine visc 2% 2.5mL/5mL & maalox/mylanta w/ simeth 2.5mL/5mL & diphenhydrAMINE 5mg/5mL Susp suspension   Commonly known as:  MAGIC Mouthwash HOSPITAL        Dose:  10 mL   Swish and swallow 10 mLs in mouth 2 times daily   Refills:  0       multivitamin, therapeutic with minerals Tabs tablet   Used for:  Diffuse large B-cell lymphoma, unspecified body region (H)        Dose:  1 tablet   Take 1 tablet by mouth daily   Quantity:  30 each   Refills:  0       ondansetron 8 MG ODT tab   Commonly known as:  ZOFRAN-ODT   Used for:  Diffuse large B-cell lymphoma of extranodal site (H)        Dose:  8 mg   Take 1 tablet (8 mg) by mouth every 8 hours as needed   Quantity:  20 tablet   Refills:  2       potassium & sodium phosphates 280-160-250 MG Packet   Commonly known as:  NEUTRA-PHOS   Used for:  Hypophosphatemia        Dose:  1 packet   Take 1 packet by mouth 2 times daily   Quantity:  60 packet   Refills:  1       potassium chloride 20 MEQ Packet   Commonly known as:  KLOR-CON   Used for:  Hypokalemia        Dose:  10 mEq   Take 10 mEq by mouth daily   Quantity:  15 tablet    Refills:  3       tolterodine 4 MG 24 hr capsule   Commonly known as:  DETROL LA   Used for:  Urinary urgency        Dose:  4 mg   Take 1 capsule (4 mg) by mouth daily   Quantity:  30 capsule   Refills:  5       traMADol 50 MG tablet   Commonly known as:  ULTRAM   Used for:  Diffuse large B-cell lymphoma of extranodal site (H)        Dose:  50 mg   Take 1 tablet (50 mg) by mouth every 6 hours as needed for moderate pain   Quantity:  30 tablet   Refills:  0                Protect others around you: Learn how to safely use, store and throw away your medicines at www.disposemymeds.org.         Follow-ups after your visit        Your next 10 appointments already scheduled     Dec 20, 2017 10:30 AM CST   NEW WOUND NURSE VISIT with Pj Danielle RN   [facility] Wound Ostomy ([facility])    [address] [place] [address]   [phone]            Jan 08, 2018 10:30 AM CST   (Arrive by 10:15 AM)   Return Visit with Archana Green PA-C   [facility] Urology and Inst for Prostate and Urologic Cancers ([facility])    [address] [place] [address] [phone]            Jan 12, 2018  8:00 AM CST   (Arrive by 7:45 AM)   Return Visit with Pj Cunha MD   [facility] Rheumatology ([facility])    [address] [place] [address] [phone]            Eric 15, 2018  8:15 AM CST   PET ONCOLOGY WHOLE BODY with UUPET1   [facility] PET CT ([facility] [place])    [address] [address] [phone]           Tell your doctor:   If there is any chance you may be pregnant or if you are breastfeeding.   If you have problems lying in small spaces (claustrophobia). If you do, your doctor may give you medicine to help you relax. If you have diabetes:   Have your exam early in the morning. Your blood glucose  will go up as the day goes by.   Your glucose level must be 180 or less at the start of the exam. Please take any medicines you need to ensure this blood glucose level. 24 hours before your scan: Don t do any heavy exercise. (No jogging, aerobics or other workouts.) Exercise will make your pictures less accurate. 6 hours before your scan:   Stop all food and liquids (except water).   Do not chew gum or suck on mints.   If you need to take medicine with food, you may take it with a few crackers.  Please call your Imaging Department at your exam site with any questions.            Jan 18, 2018  3:30 PM CST   RETURN ONC with Lenore Rodriguez MD   [facility] Blood and Marrow Transplant ([facility])    [address] [place] [id number] [phone]            Jan 31, 2018 10:00 AM CST   (Arrive by 9:45 AM)   RETURN ARRHYTHMIA with Juan Eaton MD   [facility] Heart Care ([facility])    [address] [place] [id number] [phone]               Care Instructions        After Care Instructions     Activity: Bedrest OUTPATIENT       Bedrest with head of bed flat for 1 hour then discharge. Patient is allowed to have head up for meals and for bathroom privileges only.                  Further instructions from your care team                                                              [facility]                                      Radiology Discharge instructions Post Lumbar Puncture with Chemo         AFTER YOU GO HOME      Relax and take it easy for 24 hours    We suggest bedrest until the next morning, except to go to the bathroom.    You may resume normal activity tomorrow    You may remove the bandage on your back in the evening or next morning    You may resume bathing the next day    Drink at least 4 glasses of extra fluid today if not on a fluid restriction    DO NOT drive or operate machinery  at home or at work for at least 24 hours                   CALL YOUR PRIMARY PHYSICIAN IF:    If you start to leak a large amount of fluid from the puncture site, lie down flat on your back. Call your primary physician, they will tell you if you need or return to the hosptial    You develop a severe headache    You develop nausea or vomiting     You develop a temperature of 101 degrees or greater                 ADDITIONAL INSTRUCTIONS: None         [facility] RADIOLOGY DEPARTMENT            Procedure Physician:Dr. Crenshaw  Date of Procedure:December 18, 2017               [facility]...........[phone].......Ask for the Radiologist on call. Someone is on call 24 hour/day               [facility] Toll Free Number.........[phone].....Monday-Friday 8:00 am to 4:30 pm    .                                           Additional Information About Your Visit        MyChart Information     [employer] gives you secure access to your electronic health record. If you see a primary care provider, you can also send messages to your care team and make appointments. If you have questions, please call your primary care clinic.  If you do not have a primary care provider, please call [phone] and they will assist you.        Care EveryWhere ID     This is your Care EveryWhere ID. This could be used by other organizations to access your [place] medical records  [id number]        Your Vitals Were     Blood Pressure Temperature Respirations Pulse Oximetry          136/63 98  F (36.7  C) (Oral) 16 99%         Primary Care Provider Office Phone # Fax #    Comfort Sabillon [phone]      Equal Access to Services     [facility]: Hadii aad ku hadasho Soomaali, waaxda luqadaha, qaybta kaalmada adeegyada, waxay dee hermosillo . So [facility] [phone].    ATENCIÓN: Si habla español, tiene a sarmiento disposición servicios gratuitos de asistencia lingüística. Llame al [phone].    We comply with applicable  federal civil rights laws and [place] laws. We do not discriminate on the basis of race, color, national origin, age, disability, sex, sexual orientation, or gender identity.            Thank you!     Thank you for choosing [facility] for your care. Our goal is always to provide you with excellent care. Hearing back from our patients is one way we can continue to improve our services. Please take a few minutes to complete the written survey that you may receive in the mail after you visit with us. Thank you!             Medication List: This is a list of all your medications and when to take them. Check marks below indicate your daily home schedule. Keep this list as a reference.      Medications           Morning Afternoon Evening Bedtime As Needed    acetaminophen 325 MG tablet   Commonly known as:  TYLENOL   Take 2 tablets (650 mg) by mouth every 4 hours as needed for mild pain, fever or headaches                                acyclovir 400 MG tablet   Commonly known as:  ZOVIRAX   Take 1 tablet (400 mg) by mouth 2 times daily                                ascorbic acid 500 MG Tabs   Take 1 tablet (500 mg) by mouth daily                                atenolol 25 MG tablet   Commonly known as:  TENORMIN   Take 1 tablet (25 mg) by mouth 2 times daily                                BENADRYL ALLERGY PO   Take 50 mg by mouth                                calcium polycarbophil 625 MG tablet   Commonly known as:  FIBERCON   Take 1 tablet by mouth 2 times daily                                calcium-vitamin D 600-400 MG-UNIT per tablet   Commonly known as:  CALTRATE   Take 2 tablets by mouth every morning                                digoxin 125 MCG tablet   Commonly known as:  LANOXIN   Take 1 tablet (125 mcg) by mouth daily                                enoxaparin 40 MG/0.4ML injection   Commonly known as:  LOVENOX   Inject 0.4 mLs (40 mg) Subcutaneous daily                                fluconazole 100 MG  tablet   Commonly known as:  DIFLUCAN   Take 1 tablet (100 mg) by mouth daily                                gabapentin 100 MG capsule   Commonly known as:  NEURONTIN   Take 2 capsules (200 mg) by mouth 3 times daily                                HYDROCORTISONE PO   Take 100 mg by mouth IV                                lidocaine visc 2% 2.5mL/5mL & maalox/mylanta w/ simeth 2.5mL/5mL & diphenhydrAMINE 5mg/5mL Susp suspension   Commonly known as:  MAGIC Mouthwash \[facility]\""   Swish and swallow 10 mLs in mouth 2 times daily                                multivitamin, therapeutic with minerals Tabs tablet   Take 1 tablet by mouth daily                                ondansetron 8 MG ODT tab   Commonly known as:  ZOFRAN-ODT   Take 1 tablet (8 mg) by mouth every 8 hours as needed                                potassium & sodium phosphates 280-160-250 MG Packet   Commonly known as:  NEUTRA-PHOS   Take 1 packet by mouth 2 times daily                                potassium chloride 20 MEQ Packet   Commonly known as:  KLOR-CON   Take 10 mEq by mouth daily                                tolterodine 4 MG 24 hr capsule   Commonly known as:  DETROL LA   Take 1 capsule (4 mg) by mouth daily                                traMADol 50 MG tablet   Commonly known as:  ULTRAM   Take 1 tablet (50 mg) by mouth every 6 hours as needed for moderate pain

## 2017-12-18 NOTE — DISCHARGE INSTRUCTIONS
[facility]                                      Radiology Discharge instructions Post Lumbar Puncture with Chemo         AFTER YOU GO HOME      Relax and take it easy for 24 hours    We suggest bedrest until the next morning, except to go to the bathroom.    You may resume normal activity tomorrow    You may remove the bandage on your back in the evening or next morning    You may resume bathing the next day    Drink at least 4 glasses of extra fluid today if not on a fluid restriction    DO NOT drive or operate machinery at home or at work for at least 24 hours                   CALL YOUR PRIMARY PHYSICIAN IF:    If you start to leak a large amount of fluid from the puncture site, lie down flat on your back. Call your primary physician, they will tell you if you need or return to the hosptial    You develop a severe headache    You develop nausea or vomiting     You develop a temperature of 101 degrees or greater                 ADDITIONAL INSTRUCTIONS: None         [facility] RADIOLOGY DEPARTMENT            Procedure Physician:Dr. Crenshaw  Date of Procedure:December 18, 2017               [facility]...........[phone].......Ask for the Radiologist on call. Someone is on call 24 hour/day               [facility] Toll Free Number.........[phone].....Monday-Friday 8:00 am to 4:30 pm    .

## 2017-12-18 NOTE — PROGRESS NOTES
1200 Pt tonny po intake. DC instructions reviewed with pt, copy given to pt. Pt amb, tonny well. Voided. 1220 Pt dc'd home acc by .

## 2017-12-18 NOTE — PROGRESS NOTES
[facility] Adult Lumbar Puncture and Intrathecal Chemotherapy Administration Procedure Note    Dec 18, 2017    Procedure: Lumbar Puncture to obtain CSF and give intrathecal chemotherapy    Diagnosis: DLBCL      Informed Consent was performed in INTERVENTIONAL RADIOLOGY: Procedure, benefits, risks, and alternatives explained to the patient who voiced understanding of the information and agreed to proceed with lumbar puncture. Risks include bleeding, headache, and or infection.     Procedure: completed by interventional radiology    I gave patient IT cytarabine 100 mg in 6 mL volume, preservative free.  It was pushed intrathecally in IR for 3 minutes without complication.     Disposition: Patient was sent to recovery in 2A.   Avoid soaking in a tub tonight.  Call if develops headaches, fevers and or chills.       Kelly Mcginnis PA-C  [facility]  [address] [place], [place] [id number] [phone]

## 2017-12-18 NOTE — PROGRESS NOTES
Pt back from xray s/p LP with IT chemo.  VSS.  Pt alert and oriented x4. Pt denies any pain.  Mid back site F/D/I.  Pt's  at the bedside.

## 2017-12-19 LAB
APPEARANCE CSF: CLEAR
COLOR CSF: COLORLESS
COPATH REPORT: NORMAL
RBC # CSF MANUAL: 1 /UL (ref 0–2)
TUBE # CSF: 3 #
WBC # CSF MANUAL: 0 /UL (ref 0–5)

## 2017-12-20 ENCOUNTER — OFFICE VISIT (OUTPATIENT)
Dept: WOUND CARE | Facility: CLINIC | Age: 57
End: 2017-12-20
Payer: COMMERCIAL

## 2017-12-20 ENCOUNTER — TELEPHONE (OUTPATIENT)
Dept: ONCOLOGY | Facility: CLINIC | Age: 57
End: 2017-12-20

## 2017-12-20 DIAGNOSIS — C83.30 DLBCL (DIFFUSE LARGE B CELL LYMPHOMA) (H): Primary | ICD-10-CM

## 2017-12-20 DIAGNOSIS — C83.30 DLBCL (DIFFUSE LARGE B CELL LYMPHOMA) (H): ICD-10-CM

## 2017-12-20 DIAGNOSIS — S41.102A WOUND OF LEFT UPPER EXTREMITY, INITIAL ENCOUNTER: Primary | ICD-10-CM

## 2017-12-20 LAB
ANISOCYTOSIS BLD QL SMEAR: SLIGHT
BASOPHILS # BLD AUTO: 0.1 10E9/L (ref 0–0.2)
BASOPHILS NFR BLD AUTO: 0.9 %
DIFFERENTIAL METHOD BLD: ABNORMAL
EOSINOPHIL # BLD AUTO: 0 10E9/L (ref 0–0.7)
EOSINOPHIL NFR BLD AUTO: 0 %
ERYTHROCYTE [DISTWIDTH] IN BLOOD BY AUTOMATED COUNT: 15.8 % (ref 10–15)
HCT VFR BLD AUTO: 32.7 % (ref 35–47)
HGB BLD-MCNC: 10.8 G/DL (ref 11.7–15.7)
LYMPHOCYTES # BLD AUTO: 0.3 10E9/L (ref 0.8–5.3)
LYMPHOCYTES NFR BLD AUTO: 4.3 %
MACROCYTES BLD QL SMEAR: PRESENT
MCH RBC QN AUTO: 39.1 PG (ref 26.5–33)
MCHC RBC AUTO-ENTMCNC: 33 G/DL (ref 31.5–36.5)
MCV RBC AUTO: 119 FL (ref 78–100)
METAMYELOCYTES # BLD: 0.1 10E9/L
METAMYELOCYTES NFR BLD MANUAL: 0.9 %
MONOCYTES # BLD AUTO: 0.4 10E9/L (ref 0–1.3)
MONOCYTES NFR BLD AUTO: 5.2 %
NEUTROPHILS # BLD AUTO: 6.6 10E9/L (ref 1.6–8.3)
NEUTROPHILS NFR BLD AUTO: 88.7 %
PLATELET # BLD AUTO: 71 10E9/L (ref 150–450)
PLATELET # BLD EST: ABNORMAL 10*3/UL
POLYCHROMASIA BLD QL SMEAR: SLIGHT
RBC # BLD AUTO: 2.76 10E12/L (ref 3.8–5.2)
WBC # BLD AUTO: 7.4 10E9/L (ref 4–11)

## 2017-12-20 PROCEDURE — 85025 COMPLETE CBC W/AUTO DIFF WBC: CPT | Performed by: INTERNAL MEDICINE

## 2017-12-20 NOTE — PROGRESS NOTES
Patient comes to wound clinic for wound assessment per request of Dr. Rodriguez She has an open wound of media left arm just proximal to elbow that she stated was a former peripheral IV site that was done by EMS during cardiac arrest on Memorial day.  Clean gloves are donned and current dressing removed. Yellow scab was over the site when she arrived in clinic with a suture visible that she stated was put in during a procedure in 1969.  This is treatment week: 2    Objective findings:      Location: L upper extremity    Wound aprox 1 cm x 0.5 cm wound bed covered with slough, therefore unable to determine depth    Wound description: indurated skin around wound    Tenderness: no    Odor: none     Not inflamed.    Drainage is none    Tunneling:  N/A      Undermining: all around wound     Wound Base: not visible     Slough appearance:  adherent, dry and yellow   100  %    Eschar appearance:  none       Periwound Skin: pink and pale flesh tone induration      Subjective finding:     Patient is assessed for discomfort which is: minimal    Today's status of the wound: stable    Treatment: Hard yellow material in wound bed was conservatively debrided, Cleanse with technicare, NS moistened mesalt applied to undermining and wound bed, covered with dry gauze and taped to intact skin.     Pt received the following instructions: Patient and her  were taught to perform wound management as above 2 x day a home and to follow up in 1 week. Patient and spouse both aknowleged understanding.   Signs and symptoms of infection taught.     Dr. Bae was available for supervision of care if needed or if questions should arise and regarding plan of care.    Pj Danielle  RN, CWON

## 2017-12-20 NOTE — TELEPHONE ENCOUNTER
plt count 71 today. Per Dr Rodriguez, pt may go home a long as she isn't weak or have back pain. Pt denies both. She will monitor numbness and call triage for worsening or non resolution.

## 2017-12-20 NOTE — NURSING NOTE
Chief Complaint   Patient presents with     Blood Draw     labs collected from R arm venipuncture     Valeria Yoon RN

## 2017-12-20 NOTE — MR AVS SNAPSHOT
After Visit Summary   12/20/2017    Amelia Michel    MRN: [id number]           Patient Information     Date Of Birth          1960        Visit Information        Provider Department      12/20/2017 10:30 AM [facility]le [facility]        Today's Diagnoses     Wound of left upper extremity, subsequent encounter    -  1       Follow-ups after your visit        Your next 10 appointments already scheduled     Dec 27, 2017  8:30 AM CST   RETURN OSTOMY NURSE VISIT with Pj Danielle RN   [facility] Wound Ostomy ([facility])    [address]  4th [facility] [address]   [phone]            Jan 08, 2018 10:30 AM CST   (Arrive by 10:15 AM)   Return Visit with Archana Green PA-C   [facility] Urology and Inst for Prostate and Urologic Cancers ([facility])    [address] [place] [address]  4th [facility] [address] [phone]            Jan 12, 2018  8:00 AM CST   (Arrive by 7:45 AM)   Return Visit with Pj Cunha MD    [facility] ([facility])    [address] [place] [address]  3rd [facility] [address] [phone]            Eric 15, 2018  8:15 AM CST   PET ONCOLOGY WHOLE BODY with UUPET1   [facility] [place] PET CT ([facility] [place])    [address] [address] [phone]           Tell your doctor:   If there is any chance you may be pregnant or if you are breastfeeding.   If you have problems lying in small spaces (claustrophobia). If you do, your doctor may give you medicine to help you relax. If you have diabetes:   Have your exam early in the morning. Your blood glucose will go up as the day goes by.   Your glucose level must be 180 or less at the start of the exam. Please take any medicines you need to ensure this blood glucose level. 24 hours before your scan: Don t do any heavy exercise. (No  jogging, aerobics or other workouts.) Exercise will make your pictures less accurate. 6 hours before your scan:   Stop all food and liquids (except water).   Do not chew gum or suck on mints.   If you need to take medicine with food, you may take it with a few crackers.  Please call your Imaging Department at your exam site with any questions.            Jan 18, 2018  3:30 PM CST   RETURN ONC with Lenore Rodriguez MD   [facility] Blood and Marrow Transplant ([facility])    [address]  2nd Floor  [facility] [address] [phone]            Jan 24, 2018  1:00 PM CST   (Arrive by 12:45 PM)   RETURN ARRHYTHMIA with Juan Eaton MD   [facility] Heart Care ([facility])    [address]  3rd Floor  [facility] [address] [phone]              Who to contact     Please call your clinic at [phone] to:    Ask questions about your health    Make or cancel appointments    Discuss your medicines    Learn about your test results    Speak to your doctor   If you have compliments or concerns about an experience at your clinic, or if you wish to file a complaint, please contact [facility] Patient Relations at [phone] or email us at [email or web address]         Additional Information About Your Visit        [employer] Information     Fuel ([email or web address]) gives you secure access to your electronic health record. If you see a primary care provider, you can also send messages to your care team and make appointments. If you have questions, please call your primary care clinic.  If you do not have a primary care provider, please call [phone] and they will assist you.      Fuel ([email or web address]) is an electronic gateway that provides easy, online access to your medical records. With Fuel [email or web address]), you can request a clinic appointment, read your test results, renew a prescription or communicate with your care team.     To access your existing  account, please contact your [facility] or call [phone] for assistance.        Care EveryWhere ID     This is your Care EveryWhere ID. This could be used by other organizations to access your [place] medical records  [id number]         Blood Pressure from Last 3 Encounters:   12/18/17 (P) 123/59   12/12/17 127/69   12/08/17 108/71    Weight from Last 3 Encounters:   12/12/17 55 kg (121 lb 4.8 oz)   12/08/17 55.1 kg (121 lb 8 oz)   12/05/17 57 kg (125 lb 11.2 oz)              Today, you had the following     No orders found for display       Primary Care Provider Office Phone # Fax #    Comfort Sabillon [phone] [address] [place] [address]        Equal Access to Services     SARAH [facility] : Hadii aad ku hadasho Soomaali, waaxda luqadaha, qaybta kaalmada adeegyada, durga price haynegin hermosillo . So [facility] [phone].    ATENCIÓN: Si habla español, tiene a sarmiento disposición servicios gratuitos de asistencia lingüística. Taylor al [phone].    We comply with applicable federal civil rights laws and [place] laws. We do not discriminate on the basis of race, color, national origin, age, disability, sex, sexual orientation, or gender identity.            Thank you!     Thank you for choosing [facility] OSTOMY  for your care. Our goal is always to provide you with excellent care. Hearing back from our patients is one way we can continue to improve our services. Please take a few minutes to complete the written survey that you may receive in the mail after your visit with us. Thank you!             Your Updated Medication List - Protect others around you: Learn how to safely use, store and throw away your medicines at www.disposemymeds.org.          This list is accurate as of: 12/20/17 12:38 PM.  Always use your most recent med list.                   Brand Name Dispense Instructions for use Diagnosis    acetaminophen 325 MG tablet     TYLENOL     Take 2 tablets (650 mg) by mouth every 4 hours as needed for mild pain, fever or headaches    Diffuse large B-cell lymphoma of extranodal site (H)       acyclovir 400 MG tablet    ZOVIRAX    60 tablet    Take 1 tablet (400 mg) by mouth 2 times daily    Diffuse large B-cell lymphoma, unspecified body region (H)       ascorbic acid 500 MG Tabs     30 tablet    Take 1 tablet (500 mg) by mouth daily    Diffuse large B-cell lymphoma, unspecified body region (H)       atenolol 25 MG tablet    TENORMIN    60 tablet    Take 1 tablet (25 mg) by mouth 2 times daily    Atrial tachycardia (H)       BENADRYL ALLERGY PO      Take 50 mg by mouth        calcium polycarbophil 625 MG tablet    FIBERCON     Take 1 tablet by mouth 2 times daily        calcium-vitamin D 600-400 MG-UNIT per tablet    CALTRATE    60 tablet    Take 2 tablets by mouth every morning    Diffuse large B-cell lymphoma, unspecified body region (H)       digoxin 125 MCG tablet    LANOXIN    30 tablet    Take 1 tablet (125 mcg) by mouth daily    Atrial tachycardia (H)       enoxaparin 40 MG/0.4ML injection    LOVENOX    30 Syringe    Inject 0.4 mLs (40 mg) Subcutaneous daily    VTE (venous thromboembolism), Paroxysmal atrial fibrillation (H)       fluconazole 100 MG tablet    DIFLUCAN    30 tablet    Take 1 tablet (100 mg) by mouth daily    Diffuse large B-cell lymphoma, unspecified body region (H)       gabapentin 100 MG capsule    NEURONTIN    180 capsule    Take 2 capsules (200 mg) by mouth 3 times daily    Critical illness myopathy       HYDROCORTISONE PO      Take 100 mg by mouth IV        lidocaine visc 2% 2.5mL/5mL & maalox/mylanta w/ simeth 2.5mL/5mL & diphenhydrAMINE 5mg/5mL Susp suspension    [facility]     Swish and swallow 10 mLs in mouth 2 times daily        multivitamin, therapeutic with minerals Tabs tablet     30 each    Take 1 tablet by mouth daily    Diffuse large B-cell lymphoma, unspecified body region (H)        ondansetron 8 MG ODT tab    ZOFRAN-ODT    20 tablet    Take 1 tablet (8 mg) by mouth every 8 hours as needed    Diffuse large B-cell lymphoma of extranodal site (H)       potassium & sodium phosphates 280-160-250 MG Packet    NEUTRA-PHOS    60 packet    Take 1 packet by mouth 2 times daily    Hypophosphatemia       potassium chloride 20 MEQ Packet    KLOR-CON    15 tablet    Take 10 mEq by mouth daily    Hypokalemia       tolterodine 4 MG 24 hr capsule    DETROL LA    30 capsule    Take 1 capsule (4 mg) by mouth daily    Urinary urgency       traMADol 50 MG tablet    ULTRAM    30 tablet    Take 1 tablet (50 mg) by mouth every 6 hours as needed for moderate pain    Diffuse large B-cell lymphoma of extranodal site (H)

## 2017-12-20 NOTE — TELEPHONE ENCOUNTER
"Called pt back regarding her Mychart message.  She had her second IT chemo cytarabine on 12/18. 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  She can walk with her walker carefully. She can feel when she touches there numb area. She does not feel tingling, just very numb. The numbness has not gotten worse or better or extended since yesterday morning.    No swelling or discoloration.  Spoke to Kelly who states the numbness is in a different dermatone than the site of the LP so not sure if numbness is related.  Pt denies any back pain or headache.  Spoke to Dr Rodriguez who would like pt to get CBC to check plt count which was 72 yesterday.  Pt also mentions she was very lethargic and ate poorly yesterday but that has resolved today.  Pt is able to get CBC right away as she was in the building for a wound appt this morning. Will watch for result.  "

## 2017-12-27 ENCOUNTER — OFFICE VISIT (OUTPATIENT)
Dept: WOUND CARE | Facility: CLINIC | Age: 57
End: 2017-12-27
Payer: COMMERCIAL

## 2017-12-27 DIAGNOSIS — S41.102A WOUND OF LEFT UPPER EXTREMITY, INITIAL ENCOUNTER: Primary | ICD-10-CM

## 2017-12-27 NOTE — MR AVS SNAPSHOT
After Visit Summary   12/27/2017    Amelia Michel    MRN: [id number]           Patient Information     Date Of Birth          1960        Visit Information        Provider Department      12/27/2017 8:30 AM Pj Danielle RN [facility] Wound Ostomy         Follow-ups after your visit        Your next 10 appointments already scheduled     Jan 04, 2018  1:30 PM CST   RETURN OSTOMY NURSE VISIT with ABBEY Smith [facility] Wound Ostomy ([facility])    [address] [place] [address]  4th [facility] [address]   [phone]            Jan 08, 2018 10:30 AM CST   (Arrive by 10:15 AM)   Return Visit with CORI Arenas [facility] Urology and [facility] for Prostate and Urologic Cancers ([facility])    [address] [place] [address] [phone]            Jan 12, 2018  8:00 AM CST   (Arrive by 7:45 AM)   Return Visit with Pj Cunha MD   [facility] Rheumatology ([facility])    [address] [place] [address]  3rd [facility] [address] [phone]            Eric 15, 2018  8:15 AM CST   PET ONCOLOGY WHOLE BODY with UUPET1   [facility] [place] PET [facility] ([facility] [place])    [address] [facility] [address] [phone]           Tell your doctor:   If there is any chance you may be pregnant or if you are breastfeeding.   If you have problems lying in small spaces (claustrophobia). If you do, your doctor may give you medicine to help you relax. If you have diabetes:   Have your exam early in the morning. Your blood glucose will go up as the day goes by.   Your glucose level must be 180 or less at the start of the exam. Please take any medicines you need to ensure this blood glucose level. 24 hours before your scan: Don t do any heavy exercise. (No jogging, aerobics or other workouts.) Exercise will make your pictures less accurate.  6 hours before your scan:   Stop all food and liquids (except water).   Do not chew gum or suck on mints.   If you need to take medicine with food, you may take it with a few crackers.  Please call your Imaging Department at your exam site with any questions.            Jan 18, 2018  3:30 PM CST   RETURN ONC with Lenore Rodriguez MD   [facility] Blood and Marrow Transplant ([facility])    [address]  2nd Floor  [facility] [address] [phone]            Jan 24, 2018  1:00 PM CST   (Arrive by 12:45 PM)   RETURN ARRHYTHMIA with Juan Eaton MD   [facility] Heart Care ([facility])    [address]  3rd Floor  [facility] [address] [phone]              Who to contact     Please call your clinic at [phone] to:    Ask questions about your health    Make or cancel appointments    Discuss your medicines    Learn about your test results    Speak to your doctor   If you have compliments or concerns about an experience at your clinic, or if you wish to file a complaint, please contact [facility] Patient Relations at [phone] or email us at [email or web address]         Additional Information About Your Visit        [employer] Information     vSocial gives you secure access to your electronic health record. If you see a primary care provider, you can also send messages to your care team and make appointments. If you have questions, please call your primary care clinic.  If you do not have a primary care provider, please call [phone] and they will assist you.      vSocial is an electronic gateway that provides easy, online access to your medical records. With vSocial, you can request a clinic appointment, read your test results, renew a prescription or communicate with your care team.     To access your existing account, please contact your [facility] or call  [phone] for assistance.        Care EveryWhere ID     This is your Care EveryWhere ID. This could be used by other organizations to access your [place] medical records  [id number]         Blood Pressure from Last 3 Encounters:   12/18/17 (P) 123/59   12/12/17 127/69   12/08/17 108/71    Weight from Last 3 Encounters:   12/12/17 55 kg (121 lb 4.8 oz)   12/08/17 55.1 kg (121 lb 8 oz)   12/05/17 57 kg (125 lb 11.2 oz)              Today, you had the following     No orders found for display       Primary Care Provider Office Phone # Fax #    Comfort Sabillon [phone] [address] [place] [address]        Equal Access to Services     CATINA HEAD : Hadii aad ku hadasho [place], waaxda luqadaha, qaybta kaalmada adeegyada, durga hermosillo . So [facility] [phone].    ATENCIÓN: Si habla español, tiene a sarmiento disposición servicios gratuitos de asistencia lingüística. Llame al [phone].    We comply with applicable federal civil rights laws and [place] laws. We do not discriminate on the basis of race, color, national origin, age, disability, sex, sexual orientation, or gender identity.            Thank you!     Thank you for choosing [facility] OSTOMY  for your care. Our goal is always to provide you with excellent care. Hearing back from our patients is one way we can continue to improve our services. Please take a few minutes to complete the written survey that you may receive in the mail after your visit with us. Thank you!             Your Updated Medication List - Protect others around you: Learn how to safely use, store and throw away your medicines at www.disposemymeds.org.          This list is accurate as of: 12/27/17  9:06 AM.  Always use your most recent med list.                   Brand Name Dispense Instructions for use Diagnosis    acetaminophen 325 MG tablet    TYLENOL     Take 2 tablets (650 mg) by mouth every 4 hours as needed for mild  pain, fever or headaches    Diffuse large B-cell lymphoma of extranodal site (H)       acyclovir 400 MG tablet    ZOVIRAX    60 tablet    Take 1 tablet (400 mg) by mouth 2 times daily    Diffuse large B-cell lymphoma, unspecified body region (H)       ascorbic acid 500 MG Tabs     30 tablet    Take 1 tablet (500 mg) by mouth daily    Diffuse large B-cell lymphoma, unspecified body region (H)       atenolol 25 MG tablet    TENORMIN    60 tablet    Take 1 tablet (25 mg) by mouth 2 times daily    Atrial tachycardia (H)       BENADRYL ALLERGY PO      Take 50 mg by mouth        calcium polycarbophil 625 MG tablet    FIBERCON     Take 1 tablet by mouth 2 times daily        calcium-vitamin D 600-400 MG-UNIT per tablet    CALTRATE    60 tablet    Take 2 tablets by mouth every morning    Diffuse large B-cell lymphoma, unspecified body region (H)       digoxin 125 MCG tablet    LANOXIN    30 tablet    Take 1 tablet (125 mcg) by mouth daily    Atrial tachycardia (H)       enoxaparin 40 MG/0.4ML injection    LOVENOX    30 Syringe    Inject 0.4 mLs (40 mg) Subcutaneous daily    VTE (venous thromboembolism), Paroxysmal atrial fibrillation (H)       fluconazole 100 MG tablet    DIFLUCAN    30 tablet    Take 1 tablet (100 mg) by mouth daily    Diffuse large B-cell lymphoma, unspecified body region (H)       gabapentin 100 MG capsule    NEURONTIN    180 capsule    Take 2 capsules (200 mg) by mouth 3 times daily    Critical illness myopathy       HYDROCORTISONE PO      Take 100 mg by mouth IV        lidocaine visc 2% 2.5mL/5mL & maalox/mylanta w/ simeth 2.5mL/5mL & diphenhydrAMINE 5mg/5mL Susp suspension    [facility]     Swish and swallow 10 mLs in mouth 2 times daily        multivitamin, therapeutic with minerals Tabs tablet     30 each    Take 1 tablet by mouth daily    Diffuse large B-cell lymphoma, unspecified body region (H)       ondansetron 8 MG ODT tab    ZOFRAN-ODT    20 tablet    Take 1 tablet (8 mg) by mouth  every 8 hours as needed    Diffuse large B-cell lymphoma of extranodal site (H)       potassium & sodium phosphates 280-160-250 MG Packet    NEUTRA-PHOS    60 packet    Take 1 packet by mouth 2 times daily    Hypophosphatemia       potassium chloride 20 MEQ Packet    KLOR-CON    15 tablet    Take 10 mEq by mouth daily    Hypokalemia       tolterodine 4 MG 24 hr capsule    DETROL LA    30 capsule    Take 1 capsule (4 mg) by mouth daily    Urinary urgency       traMADol 50 MG tablet    ULTRAM    30 tablet    Take 1 tablet (50 mg) by mouth every 6 hours as needed for moderate pain    Diffuse large B-cell lymphoma of extranodal site (H)

## 2017-12-27 NOTE — IP AVS SNAPSHOT
MRN:[id number]                      After Visit Summary   8/4/2017    Amelia Michel    MRN: [id number]           Thank you!     Thank you for choosing [facility] for your care. Our goal is always to provide you with excellent care. Hearing back from our patients is one way we can continue to improve our services. Please take a few minutes to complete the written survey that you may receive in the mail after you visit with us. Thank you!        Patient Information     Date Of Birth          1960        About your hospital stay     You were admitted on:  August 4, 2017 You last received care in the:  [facility]    You were discharged on:  August 4, 2017       Who to Call     For medical emergencies, please call 911.  For non-urgent questions about your medical care, please call your primary care provider or clinic, [phone]          Attending Provider     Provider Specialty    Mary Babcock MD Physical Medicine and Rehab       Primary Care Provider Office Phone # Fax #    Comfort Sabillon [phone]      Your next 10 appointments already scheduled     Aug 16, 2017 11:00 AM CDT   [facility] Lab Draw with  MASONIC LAB DRAW   [facility] Masonic Lab Draw ([facility])    [address] [id number] [phone]            Aug 16, 2017 11:30 AM CDT   Infusion 360 with  ONCOLOGY INFUSION, UC 31 ATC   [facility] ([facility])    [address] [address] [phone]            Aug 16, 2017 12:15 PM CDT   RETURN ONC with Lenore Rodriguez MD   [facility] Blood and Marrow Transplant ([facility])    [address] [address]   [phone]            Sep 15, 2017 10:00 AM CDT   (Arrive by 9:45 AM)   New Patient Visit with Pj Cunha MD   [facility] Rheumatology ([facility]  Transitional Care Note  Subjective:       Patient ID: Herson Chavez is a 39 y.o. male.  Chief Complaint: Hospital Follow Up    Family and/or Caretaker present at visit?  No.  Diagnostic tests reviewed/disposition: I have reviewed all completed as well as pending diagnostic tests at the time of discharge.  Disease/illness education: yes  Home health/community services discussion/referrals: Patient has home health established at home.   Establishment or re-establishment of referral orders for community resources: No other necessary community resources.   Discussion with other health care providers: No discussion with other health care providers necessary.   HPI: 38 yo male with AIDs presents for hospital f/u. Presented to ED after he passed out while at work.  He has recently noted a lesion on his chin 2 weeks prior, with additional findings on neck, arm, trunk.   He last took Atripla in July 2017. ID is consulted for ART noncompliance in a patient with AIDS and new skin manifestations, syncope. LP and CSF studies and full micro workup were performed in the ED, negative. He was on Avelox for empiric lung coverage of CAP, and Dermatology consulted for skin lesions regarding KS. 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 RPR and VRDL positive pt is being treated with contiuous IV Pen G for neurosyphilis, today is his last day. He has home health coming to his house. Discharge on Bactrim DS once daily for PJP ppx. Since being home, pt states he is doing well. He denies fever, SOB, diarrhea, nausea  Social History     Social History    Marital status: Single     Spouse name: N/A    Number of children: N/A    Years of education: N/A     Occupational History    Not on file.     Social History Main Topics    Smoking status: Former Smoker     Types: Cigarettes    Smokeless tobacco: Never Used    Alcohol use 0.0 oz/week    Drug use: No    Sexual activity: No     Other Topics Concern    Not on file      Social History Narrative    No narrative on file       Review of Systems   Constitutional: Negative for fever.   Respiratory: Positive for cough. Negative for shortness of breath.    Cardiovascular: Negative for chest pain.   Gastrointestinal: Negative for constipation, diarrhea and nausea.       Objective:      Physical Exam   Constitutional: He appears well-developed. No distress.   HENT:   Head: Normocephalic and atraumatic.   Mouth/Throat:       Cardiovascular: Normal rate and regular rhythm.    Pulmonary/Chest: Effort normal and breath sounds normal.   Skin: Skin is warm and dry. Lesion and rash noted. Rash is papular. He is not diaphoretic.        Psychiatric: He has a normal mood and affect. His behavior is normal.   Vitals reviewed.      Assessment:       1. AIDS (acquired immunodeficiency syndrome), CD4 <=200    2. Latent syphilis    3. Screening for cholesterol level    4. Need for pneumococcal vaccination        Plan:         Herson was seen today for hospital follow up.    Diagnoses and all orders for this visit:    AIDS (acquired immunodeficiency syndrome), CD4 <=200  -     Ambulatory referral to Outpatient Case Management  -     Will see ID on Jan 8, will not start medication at this time will wait for ID. Advised pt that we will work with case management, [facility] to make medication affordable   -     continue bactrim daily    Latent syphilis  -     Ambulatory referral to Outpatient Case Management  -     Continue Pen G today, will get removed    Screening for cholesterol level  -     Lipid panel; Future    Need for pneumococcal vaccination  -     Pneumococcal Conjugate Vaccine (13 Valent) (IM)         "[facility]    [address]  [address] [facility] [address]   [phone]            Sep 27, 2017  1:00 PM CDT   (Arrive by 12:45 PM)   Implanted Defibulator with Uc Cv Device 1   [facility] ([facility])    [address]  [address] [facility] [address] [phone]            Sep 27, 2017  1:30 PM CDT   (Arrive by 1:15 PM)   RETURN ARRHYTHMIA with Juan Eaton MD   [facility] ([facility])    [address]  [address] [facility] [address] [phone]              Pending Results     No orders found from 8/2/2017 to 8/5/2017.            Admission Information     Date & Time Provider Department Dept. Phone    8/4/2017 Mary Babcock MD  ACUTE REHAB [phone]      Your Vitals Were     Blood Pressure Pulse Temperature Respirations Height Weight    146/63 (BP Location: Left leg) 56 95.6  F (35.3  C) (Oral) 20 1.473 m (4' 10\") 65.3 kg (144 lb)    Pulse Oximetry BMI (Body Mass Index)                96% 30.1 kg/m2          MyChart Information     [employer] gives you secure access to your electronic health record. If you see a primary care provider, you can also send messages to your care team and make appointments. If you have questions, please call your primary care clinic.  If you do not have a primary care provider, please call [phone] and they will assist you.        Care EveryWhere ID     This is your Care EveryWhere ID. This could be used by other organizations to access your [facility] medical records  [id number]        Equal Access to Services     CATINA HEAD : khari Nayak, durga santizo. So [facility] [phone].    ATENCIÓN: Si habla español, tiene a sarmiento disposición servicios gratuitos de asistencia lingüística. Llame al [phone].    We comply with applicable federal civil rights laws and " [place] laws. We do not discriminate on the basis of race, color, national origin, age, disability sex, sexual orientation or gender identity.               Review of your medicines      UNREVIEWED medicines. Ask your doctor about these medicines        Dose / Directions    acetaminophen 325 MG tablet   Commonly known as:  TYLENOL   Used for:  Rheumatoid arthritis involving multiple sites with positive rheumatoid factor (H)        Dose:  650 mg   Take 2 tablets (650 mg) by mouth every 4 hours as needed for mild pain   Quantity:  100 tablet   Refills:  0       acyclovir 400 MG tablet   Commonly known as:  ZOVIRAX   Indication:  Treatment to Prevent Cytomegalovirus Disease   Used for:  Diffuse large B-cell lymphoma of extranodal site (H)        Dose:  400 mg   Take 1 tablet (400 mg) by mouth 2 times daily   Quantity:  30 tablet   Refills:  0       allopurinol 300 MG tablet   Commonly known as:  ZYLOPRIM   Used for:  Diffuse large B-cell lymphoma of extranodal site (H)        Dose:  300 mg   Take 1 tablet (300 mg) by mouth daily   Quantity:  30 tablet   Refills:  0       ascorbic acid 500 MG Tabs   Used for:  Deficiency of nutrient element, unspecified        Dose:  500 mg   Take 1 tablet (500 mg) by mouth daily   Quantity:  30 tablet   Refills:  0       atenolol 50 MG tablet   Commonly known as:  TENORMIN   Used for:  Paroxysmal atrial fibrillation (H), Atrial tachycardia (H), Other secondary hypertension        Dose:  25 mg   Take 0.5 tablets (25 mg) by mouth daily   Quantity:  30 tablet   Refills:  0       beta carotene 28382 UNIT capsule   Used for:  Deficiency of nutrient element, unspecified        Dose:  00069 Units   Take 1 capsule (25,000 Units) by mouth daily   Refills:  0       Calcium Carb-Cholecalciferol 600-800 MG-UNIT Tabs   Used for:  Hypocalcemia        Dose:  2 tablet   Take 2 tablets by mouth every morning   Refills:  0       cetirizine 10 MG tablet   Commonly known as:  zyrTEC   Used for:  Rash  and nonspecific skin eruption        Dose:  10 mg   Take 1 tablet (10 mg) by mouth daily as needed for allergies   Quantity:  30 tablet   Refills:  0       digoxin 250 MCG tablet   Commonly known as:  LANOXIN   Used for:  Atrial tachycardia (H), Paroxysmal atrial fibrillation (H)        Dose:  125 mcg   Take 0.5 tablets (125 mcg) by mouth daily   Quantity:  30 tablet   Refills:  0       filgrastim 15 mcg/mL (in Dextrose) 15 mcg/ml   Commonly known as:  NEUPOGEN   Used for:  Diffuse large B-cell lymphoma of extranodal site (H)        Dose:  5 mcg/kg   Inject 20 mLs (300 mcg) into the vein daily   Refills:  0       fluconazole 200 MG tablet   Commonly known as:  DIFLUCAN   Indication:  lymphoma, starting chemotherapy, neutropenic   Used for:  Diffuse large B-cell lymphoma of extranodal site (H)        Dose:  200 mg   Take 1 tablet (200 mg) by mouth daily   Quantity:  30 tablet   Refills:  0       folic acid 1 MG tablet   Commonly known as:  FOLVITE   Used for:  Folic acid deficiency        Dose:  1 mg   Take 1 tablet (1 mg) by mouth daily   Quantity:  30 tablet   Refills:  0       furosemide 40 MG tablet   Commonly known as:  LASIX   Used for:  Generalized edema        Dose:  40 mg   Take 1 tablet (40 mg) by mouth 2 times daily   Quantity:  30 tablet   Refills:  0       gabapentin 100 MG capsule   Commonly known as:  NEURONTIN   Used for:  Mononeuropathy        Dose:  200 mg   Take 2 capsules (200 mg) by mouth 3 times daily   Quantity:  180 capsule   Refills:  3       * insulin aspart 100 UNIT/ML injection   Commonly known as:  NovoLOG PEN   Used for:  Hyperglycemia        Dose:  1-5 Units   Inject 1-5 Units Subcutaneous At Bedtime   Refills:  0       * insulin aspart 100 UNIT/ML injection   Commonly known as:  NovoLOG PEN   Used for:  Hyperglycemia        Dose:  1-7 Units   Inject 1-7 Units Subcutaneous 3 times daily (before meals)   Refills:  0       melatonin 1 MG Tabs tablet   Used for:  Insomnia, unspecified         Dose:  1-3 mg   Take 1-3 tablets (1-3 mg) by mouth nightly as needed for sleep   Refills:  0       morphine 0.1% in solosite topical gel   Used for:  Arm pain, left        Dose:  2 g   Place 2 g onto the skin 3 times daily as needed   Refills:  0       multivitamin, therapeutic with minerals Tabs tablet   Used for:  Deficiency of nutrient element, unspecified        Dose:  1 tablet   Take 1 tablet by mouth daily   Quantity:  30 each   Refills:  0       potassium chloride SA 20 MEQ CR tablet   Commonly known as:  K-DUR/KLOR-CON M   Used for:  Deficiency of nutrient element, unspecified        Dose:  40 mEq   Take 2 tablets (40 mEq) by mouth daily   Quantity:  90 tablet   Refills:  0       predniSONE 5 MG tablet   Commonly known as:  DELTASONE   Used for:  Cardiogenic shock (H), Rheumatoid arthritis involving multiple sites with positive rheumatoid factor (H)        Dose:  5 mg   Take 1 tablet (5 mg) by mouth daily   Refills:  0       senna-docusate 8.6-50 MG per tablet   Commonly known as:  SENOKOT-S;PERICOLACE   Used for:  Constipation, unspecified constipation type        Dose:  1-2 tablet   Take 1-2 tablets by mouth 2 times daily   Quantity:  100 tablet   Refills:  0       spironolactone 25 MG tablet   Commonly known as:  ALDACTONE   Used for:  Generalized edema        Dose:  12.5 mg   Take 0.5 tablets (12.5 mg) by mouth daily   Quantity:  30 tablet   Refills:  0       thiamine 50 MG Tabs   Used for:  Deficiency of nutrient element, unspecified        Dose:  50 mg   Take 1 tablet (50 mg) by mouth daily   Quantity:  30 tablet   Refills:  0       traMADol 50 MG tablet   Commonly known as:  ULTRAM   Used for:  Arm pain, left        Dose:  25 mg   Take 0.5 tablets (25 mg) by mouth every 6 hours as needed for moderate pain   Quantity:  28 tablet   Refills:  0       zinc sulfate 220 (50 ZN) MG capsule   Commonly known as:  ZINCATE   Used for:  Deficiency of nutrient element, unspecified        Dose:  220 mg   Take 1  capsule (220 mg) by mouth daily   Refills:  0       * Notice:  This list has 2 medication(s) that are the same as other medications prescribed for you. Read the directions carefully, and ask your doctor or other care provider to review them with you.             Protect others around you: Learn how to safely use, store and throw away your medicines at www.disposemymeds.org.             Medication List: This is a list of all your medications and when to take them. Check marks below indicate your daily home schedule. Keep this list as a reference.      Medications           Morning Afternoon Evening Bedtime As Needed    acetaminophen 325 MG tablet   Commonly known as:  TYLENOL   Take 2 tablets (650 mg) by mouth every 4 hours as needed for mild pain                                acyclovir 400 MG tablet   Commonly known as:  ZOVIRAX   Take 1 tablet (400 mg) by mouth 2 times daily                                allopurinol 300 MG tablet   Commonly known as:  ZYLOPRIM   Take 1 tablet (300 mg) by mouth daily                                ascorbic acid 500 MG Tabs   Take 1 tablet (500 mg) by mouth daily                                atenolol 50 MG tablet   Commonly known as:  TENORMIN   Take 0.5 tablets (25 mg) by mouth daily                                beta carotene 80419 UNIT capsule   Take 1 capsule (25,000 Units) by mouth daily                                Calcium Carb-Cholecalciferol 600-800 MG-UNIT Tabs   Take 2 tablets by mouth every morning                                cetirizine 10 MG tablet   Commonly known as:  zyrTEC   Take 1 tablet (10 mg) by mouth daily as needed for allergies                                digoxin 250 MCG tablet   Commonly known as:  LANOXIN   Take 0.5 tablets (125 mcg) by mouth daily                                filgrastim 15 mcg/mL (in Dextrose) 15 mcg/ml   Commonly known as:  NEUPOGEN   Inject 20 mLs (300 mcg) into the vein daily                                fluconazole 200  MG tablet   Commonly known as:  DIFLUCAN   Take 1 tablet (200 mg) by mouth daily                                folic acid 1 MG tablet   Commonly known as:  FOLVITE   Take 1 tablet (1 mg) by mouth daily                                furosemide 40 MG tablet   Commonly known as:  LASIX   Take 1 tablet (40 mg) by mouth 2 times daily                                gabapentin 100 MG capsule   Commonly known as:  NEURONTIN   Take 2 capsules (200 mg) by mouth 3 times daily                                * insulin aspart 100 UNIT/ML injection   Commonly known as:  NovoLOG PEN   Inject 1-5 Units Subcutaneous At Bedtime                                * insulin aspart 100 UNIT/ML injection   Commonly known as:  NovoLOG PEN   Inject 1-7 Units Subcutaneous 3 times daily (before meals)                                melatonin 1 MG Tabs tablet   Take 1-3 tablets (1-3 mg) by mouth nightly as needed for sleep                                morphine 0.1% in solosite topical gel   Place 2 g onto the skin 3 times daily as needed                                multivitamin, therapeutic with minerals Tabs tablet   Take 1 tablet by mouth daily                                potassium chloride SA 20 MEQ CR tablet   Commonly known as:  K-DUR/KLOR-CON M   Take 2 tablets (40 mEq) by mouth daily                                predniSONE 5 MG tablet   Commonly known as:  DELTASONE   Take 1 tablet (5 mg) by mouth daily                                senna-docusate 8.6-50 MG per tablet   Commonly known as:  SENOKOT-S;PERICOLACE   Take 1-2 tablets by mouth 2 times daily                                spironolactone 25 MG tablet   Commonly known as:  ALDACTONE   Take 0.5 tablets (12.5 mg) by mouth daily                                thiamine 50 MG Tabs   Take 1 tablet (50 mg) by mouth daily                                traMADol 50 MG tablet   Commonly known as:  ULTRAM   Take 0.5 tablets (25 mg) by mouth every 6 hours as needed for moderate  pain                                zinc sulfate 220 (50 ZN) MG capsule   Commonly known as:  ZINCATE   Take 1 capsule (220 mg) by mouth daily                                * Notice:  This list has 2 medication(s) that are the same as other medications prescribed for you. Read the directions carefully, and ask your doctor or other care provider to review them with you.

## 2017-12-27 NOTE — PROGRESS NOTES
Patient comes to wound clinic for wound assessment per request of Dr. Rodriguez She has an open wound of media left arm just proximal to elbow that she stated was a former peripheral IV site that was done by EMS during cardiac arrest on [facility].  Clean gloves are donned and current dressing removed. Yellow scab was over the site when she arrived in clinic with a suture visible that she stated was put in during a procedure in 1969.  This is treatment week: 3      Objective findings:      Location: L upper extremity    Wound aprox 1 cm x 0.5 cm wound bed covered with slough, therefore unable to determine depth    Wound description: indurated skin around wound    Tenderness: no    Odor: none     Not inflamed.    Drainage is none    Tunneling:  N/A      Undermining: from 7 0'clock to 4 o, clock with the deepest at 11-12 o, clock which is 0.2cm     Wound Base: not visible     Slough appearance:  adherent, dry and yellow  100  %, but wound goes in further now due to autolytic debridement of the hard yellow material    Eschar appearance:  none       Periwound Skin: pink and pale flesh tone induration      Subjective finding:     Patient is assessed for discomfort which is: minimal    Today's status of the wound: improving    Treatment: Hard yellow material in wound bed was conservatively debrided, Cleanse with technicare, NS moistened mesalt applied to undermining and wound bed, covered with dry gauze and taped to intact skin.     Pt received the following instructions:  performing wound care at home. Patient and her  were taught again how to perform wound management as above 2 x day a home and to follow up in 1 week. Patient and spouse both aknowleged understanding. Signs and symptoms of infection taught.     Dr. Walker was available for supervision of care if needed or if questions should arise and regarding plan of care.    Pj Danielle  RN, CWON

## 2018-01-04 ENCOUNTER — OFFICE VISIT (OUTPATIENT)
Dept: WOUND CARE | Facility: CLINIC | Age: 58
End: 2018-01-04
Payer: COMMERCIAL

## 2018-01-04 DIAGNOSIS — S41.102A WOUND OF LEFT UPPER EXTREMITY, INITIAL ENCOUNTER: Primary | ICD-10-CM

## 2018-01-04 NOTE — PROGRESS NOTES
Patient comes to wound clinic for wound assessment per request of Dr. Rodriguez She has an open wound of media left arm just proximal to elbow that she stated was a former peripheral IV site that was done by EMS during cardiac arrest on [facility].  Clean gloves are donned and current dressing removed. Yellow scab was over the site when she arrived in clinic with a suture visible that she stated was put in during a procedure in 1969.  This is treatment week:     Objective findings: Improved wound bed but wound getting wider and deeper, cleaning out the base      Location: L upper extremity    Wound aprox 1 cm x 1.2 cm wound bed covered with slough, therefore unable to determine depth    Wound description: indurated skin around wound    Tenderness: no    Odor: none    Drainage is none    Tunneling:  N/A      Undermining: from 4-7 0'clock   0.5 cm and at 12 0.5 cm     Wound Base: not visible     Slough appearance: much decreased  adherent, dry and yellow  530  %,     Eschar appearance:  none       Periwound Skin: pink and pale fibrous scarring     Subjective finding:     Patient is assessed for discomfort which is: minimal    Today's status of the wound: improving    Treatment: Hard yellow material in wound bed was conservatively debrided, Cleanse with technicare swab, NS moistened mesalt applied to undermining and wound bed, covered with dry gauze and taped to intact skin.     Pt received the following instructions:  performing wound care at home. Patient and her  were taught again how to perform wound management as above 2 x day a home and to follow up in 1 week. Patient and spouse both aknowleged understanding. Signs and symptoms of infection taught.     Dr. Hudson was available for supervision of care if needed or if questions should arise and regarding plan of care. Gayathri Eller RN, CWON

## 2018-01-04 NOTE — MR AVS SNAPSHOT
After Visit Summary   1/4/2018    Amelia Michel    MRN: [id number]           Patient Information     Date Of Birth          1960        Visit Information        Provider Department      1/4/2018 1:30 PM Shirley Eller RN [facility] Wound Ostomy        Today's Diagnoses     Wound of left upper extremity, subsequent encounter    -  1       Follow-ups after your visit        Your next 10 appointments already scheduled     Jan 08, 2018 10:30 AM CST   (Arrive by 10:15 AM)   Return Visit with Archana Green PA-C   [facility] Urologic Cancers ([facility] [place])    [address]  4th Floor  [place] [address]   [phone]            Jan 12, 2018  8:00 AM CST   (Arrive by 7:45 AM)   Return Visit with Pj Cunha MD   [facility] Rheumatology ([facility])    [address]  [address]  [place] [address] [phone]            Eric 15, 2018  8:15 AM CST   PET ONCOLOGY WHOLE BODY with UUPET1   [facility] [place] PET [facility] ([facility])    [address] [address] [phone]           Tell your doctor:   If there is any chance you may be pregnant or if you are breastfeeding.   If you have problems lying in small spaces (claustrophobia). If you do, your doctor may give you medicine to help you relax. If you have diabetes:   Have your exam early in the morning. Your blood glucose will go up as the day goes by.   Your glucose level must be 180 or less at the start of the exam. Please take any medicines you need to ensure this blood glucose level. 24 hours before your scan: Don t do any heavy exercise. (No jogging, aerobics or other workouts.) Exercise will make your pictures less accurate. 6 hours before your scan:   Stop all food and liquids (except water).   Do not chew gum or suck on mints.   If you need to take medicine with  food, you may take it with a few crackers.  Please call your Imaging Department at your exam site with any questions.            Jan 18, 2018  2:30 PM CST   RETURN WOUND NURSE VISIT with Shirley Eller RN   [facility] Wound Ostomy ([facility])    [address]  4th Floor  [place] [id number] [phone]            Jan 18, 2018  3:30 PM CST   RETURN ONC with Lenore Rodriguez MD   [facility] Blood and Marrow Transplant ([facility])    [address]  [address]  [place] [id number] [phone]            Jan 24, 2018  1:00 PM CST   (Arrive by 12:45 PM)   RETURN ARRHYTHMIA with Juan Eaton MD   [facility] Heart Care ([facility])    [address]  [address]  [place] [id number] [phone]            Feb 13, 2018 12:30 PM CST   (Arrive by 12:15 PM)   New Plastic Surgery with Debi Jones MD   [facility] Plastic and Reconstructive Surgery ([facility])    [address]  4th [facility] [id number] [phone]           Do not wear perfume.              Who to contact     Please call your clinic at [phone] to:    Ask questions about your health    Make or cancel appointments    Discuss your medicines    Learn about your test results    Speak to your doctor   If you have compliments or concerns about an experience at your clinic, or if you wish to file a complaint, please contact [facility] Patient Relations at [phone] or email us at [email or web address]         Additional Information About Your Visit        MyChart Information     [employer] gives you secure access to your electronic health record. If you see a primary care provider, you can also send messages to your care team and make appointments. If you have questions, please call your primary care clinic.  If you do not have a primary care provider,  please call [phone] and they will assist you.      [employer] is an electronic gateway that provides easy, online access to your medical records. With [employer], you can request a clinic appointment, read your test results, renew a prescription or communicate with your care team.     To access your existing account, please contact your [facility] or call [phone] for assistance.        Care EveryWhere ID     This is your Care EveryWhere ID. This could be used by other organizations to access your [place] medical records  [id number]         Blood Pressure from Last 3 Encounters:   12/18/17 (P) 123/59   12/12/17 127/69   12/08/17 108/71    Weight from Last 3 Encounters:   12/12/17 55 kg (121 lb 4.8 oz)   12/08/17 55.1 kg (121 lb 8 oz)   12/05/17 57 kg (125 lb 11.2 oz)              Today, you had the following     No orders found for display       Primary Care Provider Office Phone # Fax #    Comfort Sabillon [phone] [address] [facility] [address]        Equal Access to Services     [facility] : Hadii aad ku hadasho Sooliviaali, waaxda luqadaha, qaybta kaalmada adecindy, durga hermosillo . So [facility] [phone].    ATENCIÓN: Si habla español, tiene a sarmiento disposición servicios gratuitos de asistencia lingüística. [facility] [phone].    We comply with applicable federal civil rights laws and [place] laws. We do not discriminate on the basis of race, color, national origin, age, disability, sex, sexual orientation, or gender identity.            Thank you!     Thank you for choosing [facility]  for your care. Our goal is always to provide you with excellent care. Hearing back from our patients is one way we can continue to improve our services. Please take a few minutes to complete the written survey that you may receive in the mail after your visit with us. Thank you!             Your Updated Medication List -  Protect others around you: Learn how to safely use, store and throw away your medicines at www.disposemymeds.org.          This list is accurate as of: 1/4/18  2:10 PM.  Always use your most recent med list.                   Brand Name Dispense Instructions for use Diagnosis    acetaminophen 325 MG tablet    TYLENOL     Take 2 tablets (650 mg) by mouth every 4 hours as needed for mild pain, fever or headaches    Diffuse large B-cell lymphoma of extranodal site (H)       acyclovir 400 MG tablet    ZOVIRAX    60 tablet    Take 1 tablet (400 mg) by mouth 2 times daily    Diffuse large B-cell lymphoma, unspecified body region (H)       ascorbic acid 500 MG Tabs     30 tablet    Take 1 tablet (500 mg) by mouth daily    Diffuse large B-cell lymphoma, unspecified body region (H)       atenolol 25 MG tablet    TENORMIN    60 tablet    Take 1 tablet (25 mg) by mouth 2 times daily    Atrial tachycardia (H)       BENADRYL ALLERGY PO      Take 50 mg by mouth        calcium polycarbophil 625 MG tablet    FIBERCON     Take 1 tablet by mouth 2 times daily        calcium-vitamin D 600-400 MG-UNIT per tablet    CALTRATE    60 tablet    Take 2 tablets by mouth every morning    Diffuse large B-cell lymphoma, unspecified body region (H)       digoxin 125 MCG tablet    LANOXIN    30 tablet    Take 1 tablet (125 mcg) by mouth daily    Atrial tachycardia (H)       enoxaparin 40 MG/0.4ML injection    LOVENOX    30 Syringe    Inject 0.4 mLs (40 mg) Subcutaneous daily    VTE (venous thromboembolism), Paroxysmal atrial fibrillation (H)       fluconazole 100 MG tablet    DIFLUCAN    30 tablet    Take 1 tablet (100 mg) by mouth daily    Diffuse large B-cell lymphoma, unspecified body region (H)       gabapentin 100 MG capsule    NEURONTIN    180 capsule    Take 2 capsules (200 mg) by mouth 3 times daily    Critical illness myopathy       HYDROCORTISONE PO      Take 100 mg by mouth IV        lidocaine visc 2% 2.5mL/5mL & maalox/mylanta w/  simeth 2.5mL/5mL & diphenhydrAMINE 5mg/5mL Susp suspension    [facility] Mouthwash [facility]     Swish and swallow 10 mLs in mouth 2 times daily        multivitamin, therapeutic with minerals Tabs tablet     30 each    Take 1 tablet by mouth daily    Diffuse large B-cell lymphoma, unspecified body region (H)       ondansetron 8 MG ODT tab    ZOFRAN-ODT    20 tablet    Take 1 tablet (8 mg) by mouth every 8 hours as needed    Diffuse large B-cell lymphoma of extranodal site (H)       potassium & sodium phosphates 280-160-250 MG Packet    NEUTRA-PHOS    60 packet    Take 1 packet by mouth 2 times daily    Hypophosphatemia       potassium chloride 20 MEQ Packet    KLOR-CON    15 tablet    Take 10 mEq by mouth daily    Hypokalemia       tolterodine 4 MG 24 hr capsule    DETROL LA    30 capsule    Take 1 capsule (4 mg) by mouth daily    Urinary urgency       traMADol 50 MG tablet    ULTRAM    30 tablet    Take 1 tablet (50 mg) by mouth every 6 hours as needed for moderate pain    Diffuse large B-cell lymphoma of extranodal site (H)

## 2018-01-08 ENCOUNTER — OFFICE VISIT (OUTPATIENT)
Dept: UROLOGY | Facility: CLINIC | Age: 58
End: 2018-01-08
Payer: COMMERCIAL

## 2018-01-08 VITALS
SYSTOLIC BLOOD PRESSURE: 124 MMHG | HEIGHT: 57 IN | BODY MASS INDEX: 24.34 KG/M2 | DIASTOLIC BLOOD PRESSURE: 75 MMHG | WEIGHT: 112.8 LBS | HEART RATE: 57 BPM

## 2018-01-08 DIAGNOSIS — N39.41 URGE INCONTINENCE OF URINE: ICD-10-CM

## 2018-01-08 DIAGNOSIS — R39.15 URINARY URGENCY: Primary | ICD-10-CM

## 2018-01-08 RX ORDER — LEVOFLOXACIN 250 MG/1
TABLET, FILM COATED ORAL
COMMUNITY
Start: 2017-11-16 | End: 2018-01-12

## 2018-01-08 ASSESSMENT — PAIN SCALES - GENERAL: PAINLEVEL: NO PAIN (0)

## 2018-01-08 NOTE — LETTER
"1/8/2018       RE: Amelia Michel  [address]  [place] [address]     Dear Colleague,    Thank you for referring your patient, Amelia Michel, to the [facility] FOR PROSTATE AND UROLOGIC CANCERS at [facility]. Please see a copy of my visit note below.    UROLOGY OFFICE VISIT - FOLLOW UP    REASON FOR VISIT: follow up urgency, urge incontinence    HPI: Ms. Amelia Michel is a 57 year old female with a PMH of diffuse large B cell lymphoma, DVT, RA, and HTN who returns to the urology clinic for follow up of urinary urgency and urge incontinence. Seen in initial consultation on 10/30/17 - please see consult note from this date for full history. In brief, she has had these urinary issues for many years. Has frequency/urgency daily and urge incontinence several times per week as well as occasional nocturnal urinary incontinence. Wears Depends and goes through 2 per day. She denies stress incontinence. She also had some issues with saddle anesthesia a few years ago. Lumbar MRI and neurosurgical consult were done who felt that urinary incontinence was not related to MRI findings and no surgical intervention was recommended.    She was started on Detrol LA 4 mg daily at last visit. Returns to clinic today and reports symptoms are slightly improved, but not fully resolved. She thinks part of the issue is joint pain due to her RA flaring so she has trouble getting up and to the bathroom in time. She plans to see her rheumatologist later this week. Continues to have urge incontinence daily and nocturnal enuresis has progressed the point of occurring almost nightly as well.     PEx  /75  Pulse 57  Ht 1.448 m (4' 9\")  Wt 51.2 kg (112 lb 12.8 oz)  BMI 24.41 kg/m2  GEN: well-appearing female in NAD  RESP: no increased respiratory effort    PVR: 25 mL as measured by ultrasound at last office visit.    ASSESSMENT/PLAN  57 year old female with urinary " frequency/urgency and urge incontinence with minimal to no improvement with Detrol. We discussed additional management options including continuing on Detrol, switching to an alternate anticholinergic or Myrbetriq, bladder botox, PTNS, or InterStim. Before proceeding with third line treatments, however, should really do some additional testing with urodynamics +/- cystoscopy. The patient is in agreement and wishes to proceed.  -Schedule video urodynamics next available.   -Continue Detrol LA 4 mg daily for now.    Additional recommendations pending

## 2018-01-08 NOTE — PROGRESS NOTES
"UROLOGY OFFICE VISIT - FOLLOW UP    REASON FOR VISIT: follow up urgency, urge incontinence    HPI: Ms. Amelia Michel is a 57 year old female with a PMH of diffuse large B cell lymphoma, DVT, RA, and HTN who returns to the urology clinic for follow up of urinary urgency and urge incontinence. Seen in initial consultation on 10/30/17 - please see consult note from this date for full history. In brief, she has had these urinary issues for many years. Has frequency/urgency daily and urge incontinence several times per week as well as occasional nocturnal urinary incontinence. Wears Depends and goes through 2 per day. She denies stress incontinence. She also had some issues with saddle anesthesia a few years ago. Lumbar MRI and neurosurgical consult were done who felt that urinary incontinence was not related to MRI findings and no surgical intervention was recommended.    She was started on Detrol LA 4 mg daily at last visit. Returns to clinic today and reports symptoms are slightly improved, but not fully resolved. She thinks part of the issue is joint pain due to her RA flaring so she has trouble getting up and to the bathroom in time. She plans to see her rheumatologist later this week. Continues to have urge incontinence daily and nocturnal enuresis has progressed the point of occurring almost nightly as well.     PEx  /75  Pulse 57  Ht 1.448 m (4' 9\")  Wt 51.2 kg (112 lb 12.8 oz)  BMI 24.41 kg/m2  GEN: well-appearing female in NAD  RESP: no increased respiratory effort    PVR: 25 mL as measured by ultrasound at last office visit.    ASSESSMENT/PLAN  57 year old female with urinary frequency/urgency and urge incontinence with minimal to no improvement with Detrol. We discussed additional management options including continuing on Detrol, switching to an alternate anticholinergic or Myrbetriq, bladder botox, PTNS, or InterStim. Before proceeding with third line treatments, however, should really do some " additional testing with urodynamics +/- cystoscopy. The patient is in agreement and wishes to proceed.  -Schedule video urodynamics next available.   -Continue Detrol LA 4 mg daily for now.    Additional recommendations pending results of above.     15 minutes spent with patient, >50% in counseling and coordination of care.    Archana Green PA-C  Department of Urology

## 2018-01-08 NOTE — MR AVS SNAPSHOT
After Visit Summary   1/8/2018    Amelia Michel    MRN: [id number]           Patient Information     Date Of Birth          1960        Visit Information        Provider Department      1/8/2018 10:30 AM Archana Green PA-C [facility]        Today's Diagnoses     Urinary urgency    -  1    Urge incontinence of urine          Care Instructions    UROLOGY CLINIC VISIT PATIENT INSTRUCTIONS    1) Schedule urodynamics next available.     If you have any issues, questions or concerns in the meantime, do not hesitate to contact us at [phone] or via Dynex.     It was a pleasure meeting with you today.  Thank you for allowing me and my team the privilege of caring for you today.  YOU are the reason we are here, and I truly hope we provided you with the excellent service you deserve.  Please let us know if there is anything else we can do for you so that we can be sure you are leaving completely satisfied with your care experience.             What is urodynamic testing?  Urodynamic testing is the best way for your doctor to look at the function of your bladder. It is like an EKG, which is used to look at the function of your heart.  The test takes between 60 and 90 minutes. You will spend about 11/2 to 2 hours in your doctor s office. For most patients, the test is not painful.  How should I get ready for this test?  1. Arrive with a full bladder, if you are able. (Try to drink six 8-ounce glasses of liquid one hour before your exam.)   2. You may want to wear loose clothing.  3. Make sure your skin below the waist is clean and dry (no lotions or powders).   If you have any of the following symptoms before the test, please call the clinic right away:Having to urinate more often or feeling a sudden urge to go   Feeling pain or burning when you urinate   Fever or chills   Smelly or cloudy urine      If we gave you a bladder diary, please  complete and bring it with you.    What happens during this test?  4. You will empty your bladder into a special toilet. The toilet measures your rate of urine flow.  5. We will then place a very small tube (catheter) into your bladder. This drains any urine that is left. We will measure the amount of urine.  6. We will place a small tube in your rectum. We ll also put a tiny patch of electrodes on the skin near the anus.  7. A computer will measure the pressure in your bladder and how well your sphincter is working. (The sphincter is the muscle that controls the bladder.)  8. Your bladder will slowly fill with a mixture of saline and contrast medium. We will take images, similar to an Xray. You will tell us when your bladder feels a bit full, quite full and completely full.  9. We will ask you to bear down several times. As you do, we will check for leaking urine.  10. You will again empty your bladder into the special toilet.    What happens after the test?  Your doctor will share the results on the day of the exam, or soon after.  After your test, you may go about your day as normal. You may notice some blood in your urine. You may feel a more urgent need to use the toilet, or you may need to go more often. These effects should imp               Follow-ups after your visit        Your next 10 appointments already scheduled     Jan 12, 2018  8:00 AM CST   (Arrive by 7:45 AM)   Return Visit with Pj Cunha MD   [facility] ([facility])    [address]  [address]  [place] [id number] [phone]            Eric 15, 2018  8:15 AM CST   PET ONCOLOGY WHOLE BODY with UUPET1   [facility] PET [facility] ([facility])    [address] [id number] [phone]           Tell your doctor:   If there is any chance you may be pregnant or if you are breastfeeding.   If you have problems lying in small spaces  (claustrophobia). If you do, your doctor may give you medicine to help you relax. If you have diabetes:   Have your exam early in the morning. Your blood glucose will go up as the day goes by.   Your glucose level must be 180 or less at the start of the exam. Please take any medicines you need to ensure this blood glucose level. 24 hours before your scan: Don t do any heavy exercise. (No jogging, aerobics or other workouts.) Exercise will make your pictures less accurate. 6 hours before your scan:   Stop all food and liquids (except water).   Do not chew gum or suck on mints.   If you need to take medicine with food, you may take it with a few crackers.  Please call your Imaging Department at your exam site with any questions.            Jan 18, 2018  2:30 PM CST   RETURN WOUND NURSE VISIT with Shirley Eller RN   [facility] Wound Ostomy ([facility])    [address]  4th [facility] [id number] [phone]            Jan 18, 2018  3:30 PM CST   RETURN ONC with Lenore Rodriguez MD   [facility] Blood and Marrow Transplant ([facility])    [address]  [address]  [facility] [id number] [phone]            Jan 24, 2018  1:00 PM CST   (Arrive by 12:45 PM)   RETURN ARRHYTHMIA with Juan Eaton MD   [facility] Care ([facility])    [address]  [address]  [facility] [address] [phone]            Feb 13, 2018 12:30 PM CST   (Arrive by 12:15 PM)   New Plastic Surgery with Debi Jones MD   [facility] Plastic [facility] Reconstructive Surgery ([facility])    [address]  4th Floor  [facility] [id number] [phone]           Do not wear perfume.            Mar 01, 2018  9:00 AM CST   (Arrive by 8:45 AM)   Urodynamics with Archana Green PA-C   [facility]rologic Cancers ([facility])     "[address] [id number] [phone]              Who to contact     Please call your clinic at [phone] to:    Ask questions about your health    Make or cancel appointments    Discuss your medicines    Learn about your test results    Speak to your doctor   If you have compliments or concerns about an experience at your clinic, or if you wish to file a complaint, please contact [facility] Patient Relations at [phone] or email us at [email or web address]         Additional Information About Your Visit        [employer] Information     [employer] gives you secure access to your electronic health record. If you see a primary care provider, you can also send messages to your care team and make appointments. If you have questions, please call your primary care clinic.  If you do not have a primary care provider, please call [phone] and they will assist you.      Urlist is an electronic gateway that provides easy, online access to your medical records. With [employer]list, you can request a clinic appointment, read your test results, renew a prescription or communicate with your care team.     To access your existing account, please contact your [facility] or call [phone] for assistance.        Care EveryWhere ID     This is your Care EveryWhere ID. This could be used by other organizations to access your [place] medical records  [id number]        Your Vitals Were     Pulse Height BMI (Body Mass Index)             57 1.448 m (4' 9\") 24.41 kg/m2          Blood Pressure from Last 3 Encounters:   01/08/18 124/75   12/18/17 (P) 123/59   12/12/17 127/69    Weight from Last 3 Encounters:   01/08/18 51.2 kg (112 lb 12.8 oz)   12/12/17 55 kg (121 lb 4.8 oz)   12/08/17 55.1 kg (121 lb 8 oz)              Today, you had the following     No orders found for display       Primary Care Provider Office Phone # Fax #    " Comfort Sabillon [phone]       [address] [address] [facility] [address]        Equal Access to Services     MANISHASARAH SONIDO : Kay laurita mcguire prachi Flores, watashda luqtania, qajustinta kaowenda catalino, durga inman. So [facility] [phone].    ATENCIÓN: Si habla español, tiene a sarmiento disposición servicios gratuitos de asistencia lingüística. Llame al [phone].    We comply with applicable federal civil rights laws and [place] laws. We do not discriminate on the basis of race, color, national origin, age, disability, sex, sexual orientation, or gender identity.            Thank you!     Thank you for choosing [facility] AND [facility]  for your care. Our goal is always to provide you with excellent care. Hearing back from our patients is one way we can continue to improve our services. Please take a few minutes to complete the written survey that you may receive in the mail after your visit with us. Thank you!             Your Updated Medication List - Protect others around you: Learn how to safely use, store and throw away your medicines at www.disposemymeds.org.          This list is accurate as of: 1/8/18 10:49 AM.  Always use your most recent med list.                   Brand Name Dispense Instructions for use Diagnosis    acetaminophen 325 MG tablet    TYLENOL     Take 2 tablets (650 mg) by mouth every 4 hours as needed for mild pain, fever or headaches    Diffuse large B-cell lymphoma of extranodal site (H)       acyclovir 400 MG tablet    ZOVIRAX    60 tablet    Take 1 tablet (400 mg) by mouth 2 times daily    Diffuse large B-cell lymphoma, unspecified body region (H)       ascorbic acid 500 MG Tabs     30 tablet    Take 1 tablet (500 mg) by mouth daily    Diffuse large B-cell lymphoma, unspecified body region (H)       atenolol 25 MG tablet    TENORMIN    60 tablet    Take 1 tablet (25 mg) by mouth 2 times daily    Atrial  tachycardia (H)       BENADRYL ALLERGY PO      Take 50 mg by mouth        calcium polycarbophil 625 MG tablet    FIBERCON     Take 1 tablet by mouth 2 times daily        calcium-vitamin D 600-400 MG-UNIT per tablet    CALTRATE    60 tablet    Take 2 tablets by mouth every morning    Diffuse large B-cell lymphoma, unspecified body region (H)       digoxin 125 MCG tablet    LANOXIN    30 tablet    Take 1 tablet (125 mcg) by mouth daily    Atrial tachycardia (H)       enoxaparin 40 MG/0.4ML injection    LOVENOX    30 Syringe    Inject 0.4 mLs (40 mg) Subcutaneous daily    VTE (venous thromboembolism), Paroxysmal atrial fibrillation (H)       fluconazole 100 MG tablet    DIFLUCAN    30 tablet    Take 1 tablet (100 mg) by mouth daily    Diffuse large B-cell lymphoma, unspecified body region (H)       gabapentin 100 MG capsule    NEURONTIN    180 capsule    Take 2 capsules (200 mg) by mouth 3 times daily    Critical illness myopathy       HYDROCORTISONE PO      Take 100 mg by mouth IV        levofloxacin 250 MG tablet    LEVAQUIN          lidocaine visc 2% 2.5mL/5mL & maalox/mylanta w/ simeth 2.5mL/5mL & diphenhydrAMINE 5mg/5mL Susp suspension    [facility]     Swish and swallow 10 mLs in mouth 2 times daily        multivitamin, therapeutic with minerals Tabs tablet     30 each    Take 1 tablet by mouth daily    Diffuse large B-cell lymphoma, unspecified body region (H)       ondansetron 8 MG ODT tab    ZOFRAN-ODT    20 tablet    Take 1 tablet (8 mg) by mouth every 8 hours as needed    Diffuse large B-cell lymphoma of extranodal site (H)       potassium & sodium phosphates 280-160-250 MG Packet    NEUTRA-PHOS    60 packet    Take 1 packet by mouth 2 times daily    Hypophosphatemia       potassium chloride 20 MEQ Packet    KLOR-CON    15 tablet    Take 10 mEq by mouth daily    Hypokalemia       tolterodine 4 MG 24 hr capsule    DETROL LA    30 capsule    Take 1 capsule (4 mg) by mouth daily    Urinary urgency        traMADol 50 MG tablet    ULTRAM    30 tablet    Take 1 tablet (50 mg) by mouth every 6 hours as needed for moderate pain    Diffuse large B-cell lymphoma of extranodal site (H)

## 2018-01-08 NOTE — NURSING NOTE
Chief Complaint   Patient presents with     RECHECK     urinary incontinence       Maria Luisa Silva MA

## 2018-01-08 NOTE — PATIENT INSTRUCTIONS
UROLOGY CLINIC VISIT PATIENT INSTRUCTIONS    1) Schedule urodynamics next available.     If you have any issues, questions or concerns in the meantime, do not hesitate to contact us at [phone] or via Dialoggy.     It was a pleasure meeting with you today.  Thank you for allowing me and my team the privilege of caring for you today.  YOU are the reason we are here, and I truly hope we provided you with the excellent service you deserve.  Please let us know if there is anything else we can do for you so that we can be sure you are leaving completely satisfied with your care experience.             What is urodynamic testing?  Urodynamic testing is the best way for your doctor to look at the function of your bladder. It is like an EKG, which is used to look at the function of your heart.  The test takes between 60 and 90 minutes. You will spend about 11/2 to 2 hours in your doctor s office. For most patients, the test is not painful.  How should I get ready for this test?  1. Arrive with a full bladder, if you are able. (Try to drink six 8-ounce glasses of liquid one hour before your exam.)   2. You may want to wear loose clothing.  3. Make sure your skin below the waist is clean and dry (no lotions or powders).   If you have any of the following symptoms before the test, please call the clinic right away:Having to urinate more often or feeling a sudden urge to go   Feeling pain or burning when you urinate   Fever or chills   Smelly or cloudy urine      If we gave you a bladder diary, please complete and bring it with you.    What happens during this test?  4. You will empty your bladder into a special toilet. The toilet measures your rate of urine flow.  5. We will then place a very small tube (catheter) into your bladder. This drains any urine that is left. We will measure the amount of urine.  6. We will place a small tube in your rectum. We ll also put a tiny patch of electrodes on the skin near the anus.  7. A  computer will measure the pressure in your bladder and how well your sphincter is working. (The sphincter is the muscle that controls the bladder.)  8. Your bladder will slowly fill with a mixture of saline and contrast medium. We will take images, similar to an Xray. You will tell us when your bladder feels a bit full, quite full and completely full.  9. We will ask you to bear down several times. As you do, we will check for leaking urine.  10. You will again empty your bladder into the special toilet.    What happens after the test?  Your doctor will share the results on the day of the exam, or soon after.  After your test, you may go about your day as normal. You may notice some blood in your urine. You may feel a more urgent need to use the toilet, or you may need to go more often. These effects should imp

## 2018-01-10 ENCOUNTER — DOCUMENTATION ONLY (OUTPATIENT)
Dept: CARE COORDINATION | Facility: CLINIC | Age: 58
End: 2018-01-10

## 2018-01-10 ENCOUNTER — MEDICAL CORRESPONDENCE (OUTPATIENT)
Dept: HEALTH INFORMATION MANAGEMENT | Facility: CLINIC | Age: 58
End: 2018-01-10

## 2018-01-10 NOTE — PROGRESS NOTES
[facility] now requests orders and shares plan of care/discharge summaries for some patients through [employer].  Please REPLY TO THIS MESSAGE in order to give authorization for orders when needed.  This is considered a verbal order, you will still receive a faxed copy of orders for signature.  Thank you for your assistance in improving collaboration for our patients.    ORDER   SN  1 x week x9, 5prn    MD SUMMARY/PLAN OF CARE    MD SUMMARY RECERT 1/8/18   57 year old female with Health History  as follows  Diffuse large B Cell lymphoma of extra prieto site, acute non occlusive right upper extremity PICC associated DVT, anemia with thrombocytopenia, Steroid induced hyperglycemia, and  hypervolemia.  Patient has ceased all Chemotherapy treatments at the end of last year, she will only have PET scans as ordered by Oncologist. Se has beeen approached with Hospice options but does not feel ready for that phase of care. She continues to remain in [facility] palliative care program. She has a slow gait and uses a walker as needed . her spouse/ Wolf is involved with daily cares that include twice a day wound care to a slow healing wound to her left antecubital region that occured from an extravasation of surrounding tissue post CT scan.   Wound measures approx 1cm x 1cm x1cm. Current woundcare is cleanse with technisoap, Mesalt moistened with saline then cover with gauze. Wound bed is clean  and without drainage today but has become deeper . Patient has a scheduled appointment with a Plastics surgery clinic  for referral .  Recommend SN visits for assessment  of self cares, teach and evaluate woundcare, perform wound care weekly, Assess emotional wellbeing and coping strategies of patient and spouse. Teach and assess medication management as patient has had recent changes with medications and wound care treatments.  Estimate ongoing cares with Palliative Cares and wound care until  And if patient  transitions to  Hospice .  Thank you

## 2018-01-12 ENCOUNTER — OFFICE VISIT (OUTPATIENT)
Dept: RHEUMATOLOGY | Facility: CLINIC | Age: 58
End: 2018-01-12
Attending: INTERNAL MEDICINE
Payer: COMMERCIAL

## 2018-01-12 VITALS
HEIGHT: 58 IN | SYSTOLIC BLOOD PRESSURE: 131 MMHG | BODY MASS INDEX: 24.35 KG/M2 | DIASTOLIC BLOOD PRESSURE: 71 MMHG | HEART RATE: 54 BPM | OXYGEN SATURATION: 97 % | WEIGHT: 116 LBS

## 2018-01-12 DIAGNOSIS — M05.742 RHEUMATOID ARTHRITIS INVOLVING BOTH HANDS WITH POSITIVE RHEUMATOID FACTOR (H): Primary | ICD-10-CM

## 2018-01-12 DIAGNOSIS — M05.741 RHEUMATOID ARTHRITIS INVOLVING BOTH HANDS WITH POSITIVE RHEUMATOID FACTOR (H): Primary | ICD-10-CM

## 2018-01-12 DIAGNOSIS — M05.742 RHEUMATOID ARTHRITIS INVOLVING BOTH HANDS WITH POSITIVE RHEUMATOID FACTOR (H): ICD-10-CM

## 2018-01-12 DIAGNOSIS — M05.741 RHEUMATOID ARTHRITIS INVOLVING BOTH HANDS WITH POSITIVE RHEUMATOID FACTOR (H): ICD-10-CM

## 2018-01-12 LAB
ALBUMIN SERPL-MCNC: 3.8 G/DL (ref 3.4–5)
ALP SERPL-CCNC: 133 U/L (ref 40–150)
ALT SERPL W P-5'-P-CCNC: 61 U/L (ref 0–50)
ANION GAP SERPL CALCULATED.3IONS-SCNC: 4 MMOL/L (ref 3–14)
AST SERPL W P-5'-P-CCNC: 50 U/L (ref 0–45)
BASOPHILS # BLD AUTO: 0.2 10E9/L (ref 0–0.2)
BASOPHILS NFR BLD AUTO: 3.5 %
BILIRUB SERPL-MCNC: 0.4 MG/DL (ref 0.2–1.3)
BUN SERPL-MCNC: 14 MG/DL (ref 7–30)
CALCIUM SERPL-MCNC: 9.8 MG/DL (ref 8.5–10.1)
CHLORIDE SERPL-SCNC: 107 MMOL/L (ref 94–109)
CO2 SERPL-SCNC: 29 MMOL/L (ref 20–32)
CREAT SERPL-MCNC: 0.54 MG/DL (ref 0.52–1.04)
CRP SERPL-MCNC: 6.7 MG/L (ref 0–8)
DIFFERENTIAL METHOD BLD: ABNORMAL
EOSINOPHIL # BLD AUTO: 0.4 10E9/L (ref 0–0.7)
EOSINOPHIL NFR BLD AUTO: 8.7 %
ERYTHROCYTE [DISTWIDTH] IN BLOOD BY AUTOMATED COUNT: 13.2 % (ref 10–15)
ERYTHROCYTE [SEDIMENTATION RATE] IN BLOOD BY WESTERGREN METHOD: 12 MM/H (ref 0–30)
GFR SERPL CREATININE-BSD FRML MDRD: >90 ML/MIN/1.7M2
GLUCOSE SERPL-MCNC: 94 MG/DL (ref 70–99)
HCT VFR BLD AUTO: 37.3 % (ref 35–47)
HGB BLD-MCNC: 12.6 G/DL (ref 11.7–15.7)
LYMPHOCYTES # BLD AUTO: 0.6 10E9/L (ref 0.8–5.3)
LYMPHOCYTES NFR BLD AUTO: 12.2 %
MCH RBC QN AUTO: 39.3 PG (ref 26.5–33)
MCHC RBC AUTO-ENTMCNC: 33.8 G/DL (ref 31.5–36.5)
MCV RBC AUTO: 116 FL (ref 78–100)
METAMYELOCYTES # BLD: 0.1 10E9/L
METAMYELOCYTES NFR BLD MANUAL: 1.7 %
MONOCYTES # BLD AUTO: 0.4 10E9/L (ref 0–1.3)
MONOCYTES NFR BLD AUTO: 7.8 %
NEUTROPHILS # BLD AUTO: 3.3 10E9/L (ref 1.6–8.3)
NEUTROPHILS NFR BLD AUTO: 66.1 %
PLATELET # BLD AUTO: 87 10E9/L (ref 150–450)
PLATELET # BLD EST: ABNORMAL 10*3/UL
POTASSIUM SERPL-SCNC: 4.2 MMOL/L (ref 3.4–5.3)
PROT SERPL-MCNC: 6.8 G/DL (ref 6.8–8.8)
RBC # BLD AUTO: 3.21 10E12/L (ref 3.8–5.2)
RBC MORPH BLD: NORMAL
SODIUM SERPL-SCNC: 140 MMOL/L (ref 133–144)
WBC # BLD AUTO: 5 10E9/L (ref 4–11)

## 2018-01-12 PROCEDURE — 36415 COLL VENOUS BLD VENIPUNCTURE: CPT | Performed by: STUDENT IN AN ORGANIZED HEALTH CARE EDUCATION/TRAINING PROGRAM

## 2018-01-12 PROCEDURE — G0463 HOSPITAL OUTPT CLINIC VISIT: HCPCS | Mod: ZF

## 2018-01-12 PROCEDURE — 85025 COMPLETE CBC W/AUTO DIFF WBC: CPT | Performed by: STUDENT IN AN ORGANIZED HEALTH CARE EDUCATION/TRAINING PROGRAM

## 2018-01-12 PROCEDURE — 80053 COMPREHEN METABOLIC PANEL: CPT | Performed by: STUDENT IN AN ORGANIZED HEALTH CARE EDUCATION/TRAINING PROGRAM

## 2018-01-12 PROCEDURE — 85652 RBC SED RATE AUTOMATED: CPT | Performed by: STUDENT IN AN ORGANIZED HEALTH CARE EDUCATION/TRAINING PROGRAM

## 2018-01-12 PROCEDURE — 86140 C-REACTIVE PROTEIN: CPT | Performed by: STUDENT IN AN ORGANIZED HEALTH CARE EDUCATION/TRAINING PROGRAM

## 2018-01-12 RX ORDER — PREDNISONE 5 MG/1
5 TABLET ORAL DAILY
Qty: 90 TABLET | Refills: 2 | Status: SHIPPED | OUTPATIENT
Start: 2018-01-12 | End: 2018-03-30

## 2018-01-12 RX ORDER — HYDROXYCHLOROQUINE SULFATE 200 MG/1
200 TABLET, FILM COATED ORAL 2 TIMES DAILY
Qty: 60 TABLET | Refills: 5 | Status: ON HOLD | OUTPATIENT
Start: 2018-01-12 | End: 2018-03-13

## 2018-01-12 ASSESSMENT — PAIN SCALES - GENERAL: PAINLEVEL: SEVERE PAIN (6)

## 2018-01-12 NOTE — NURSING NOTE
"Chief Complaint   Patient presents with     RECHECK     Follow up for joint pain        Initial /71  Pulse 54  Ht 1.473 m (4' 10\")  Wt 52.6 kg (116 lb)  SpO2 97%  BMI 24.24 kg/m2 Estimated body mass index is 24.24 kg/(m^2) as calculated from the following:    Height as of this encounter: 1.473 m (4' 10\").    Weight as of this encounter: 52.6 kg (116 lb).  Medication Reconciliation: complete   Dacia Daniel CMA    "

## 2018-01-12 NOTE — PROGRESS NOTES
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 A little surprising given she received rituximab in late November. Suspect she has some chronic degenerative changes to her hands, definitely her wrists, that exacerbate low levels of inflammatory pain but that are improved with prednisone. Will plan to treat symptomatically until status of lymphoma and future therapeutic plans more clear.    -restart prednisone 5 mg daily and  mg bid; will need eye exam, to notify the clinic in the event her symptoms are not adequately controlled with this regimen; I would be reluctant to pursue higher dose steroids given her left elbow wounds that are not healing  -follow up in 3 months  -should get DEXA scan at some point to evaluate bone density and consider whether bisphosphonate indicated (had 7-8 years of Fosamax in early 2000's)    Seen and discussed with Dr. Domínguez.    Pj Cunha MD  Rheumatology Fellow  [phone]      Attending Note: I saw and evaluated the patient with Dr. Cunha. I agree with the assessment and plan.     Maribell Domínguez MD    Orders Placed This Encounter   Procedures     Comprehensive metabolic panel     CBC with platelets differential      Erythrocyte sedimentation rate auto     CRP inflammation                 Active Problem List:     Patient Active Problem List    Diagnosis Date Noted     H/O cardiac arrest 09/26/2017     Priority: Medium     DLBCL (diffuse large B cell lymphoma) (H) 09/07/2017     Priority: Medium     Physical deconditioning 08/12/2017     Priority: Medium     Febrile neutropenia (H) 08/08/2017     Priority: Medium     Diffuse large B cell lymphoma (H) 08/04/2017     Priority: Medium     Diffuse large B-cell lymphoma of extranodal site (H) 07/27/2017     Priority: Medium     Sepsis (H) 06/19/2017     Priority: Medium     Wound of left upper extremity 06/19/2017     Priority: Medium     Critical illness myopathy 06/16/2017     Priority: Medium     Acute respiratory failure with hypoxia (H) 06/09/2017     Priority: Medium     Hypertension      Priority: Medium     Cardiogenic shock (H) 05/29/2017     Priority: Medium     Atrial tachycardia (H) 05/16/2017     Priority: Medium     Lymphedema of both lower extremities 04/04/2017     Priority: Medium     Other rheumatoid arthritis with rheumatoid factor of multiple sites 05/02/2016     Priority: Medium     Hyperlipidemia LDL goal <130 02/22/2011     Priority: Medium     HTN (hypertension)      Priority: Medium     Primary pulmonary hypertension (H)      Priority: Medium     Seeing [facility].        Health Care Home 08/21/2017     Priority: Low     *See Letters for [facility] Care Plan: My Access Plan              History of Present Illness:   Amelia Michel is a 56 year old female who presents for evaluation of longstanding rheumatoid arthritis. We initially met when she was hospitalized in July after a cardiac arrest that at the time was of unclear etiology. She was admitted in late May after an out of hospital arrest with ROSC in the field. She was initially in atrial fibrillation and given multiple IV medications including some amiodarone and developed what sounds like a wide complex  tachycardia and was shocked out of it. During her hospitalization she had severe cytopenias requiring transfusions as well as fevers of unknown origin. She ultimately had a bone marrow and liver bioies which showed diffuse large B cell lymphoma. A PET scan showed extensive involvement including a possible cardiac lesion. She received a cycle of R-EPOCH 7/27/17 that was complicated by severe cytopenias requiring transfusions and a rehab stay. Her LP was negative for CNS involvement 8/23/17 and was transitioned to her first cycle of R-CHOP 8/22/17 with high-dose methotrexate on day 15 for CNS prophylaxis. She has developed a severe neuropathy in the digits of her upper and lower extremities that has been attributed to the methotrexate.     Her rheumatoid arthritis had been managed by Dr. Dumas for many years. She had relatively inactive disease prior to her arrest but was on methotrexate 10 mg PO per week, Arava 10 mg daily, Plaquenil 200 mg twice per day, and prednisone 3 mg daily. She has had partial fusion of her right wrist. After discharge from the hospital she was put on prednisone 5 mg daily as monotherapy. Her disease was completely inactive in the setting of her chemotherapy. Prednisone and Plaquenil discontinued on account of chemotherapy.    Last seen: 9/15/17    Interim history: Mrs. Michel returns today because she has noted pain and warmth in her hands and wrists consistent with her prior active rheumatoid arthritis. She has been off systemic therapy since early December and has noted morning stiffness that lasts all day. She inquires about going back on some prednisone. She has a PET scan on Monday and is unclear what her future therapies will be if any. She did unfortunately develop neuropathy in her right foot after intrathecal cytarabine treatment. It has not gotten any better and she is wearing a brace.          Review of Systems (other than HPI):   Constitutional: negative  Skin: negative  Eyes:  negative  Ears/Nose/Throat: negative  Respiratory: negative  Cardiovascular: negative  Gastrointestinal: negative  Genitourinary: negative  Musculoskeletal: negative  Neurologic: negative  Psychiatric: negative  Hematologic/Lymphatic/Immunologic: negative  Endocrine: negative          Past Medical History:     Past Medical History:   Diagnosis Date     Atrial fibrillation (H)      Cancer (H)     lymphoma     Cardiac arrest (H)      Hypertension      Neuropathy      Rheumatoid arthritis(714.0)      Past Surgical History:   Procedure Laterality Date     ANESTHESIA CARDIOVERSION N/A 5/17/2017    Procedure: ANESTHESIA CARDIOVERSION;  ANESTHESIA CARDIOVERSION;  Surgeon: GENERIC ANESTHESIA PROVIDER;  Location: [facility] OR     ARTHRODESIS WRIST  2000    Right wrist     BONE MARROW ASPIRATE &BIOPSY  7/12/2017          FOOT SURGERY      4 left and 2 right     RELEASE CARPAL TUNNEL BILATERAL       REPAIR VENTRICULAR SEPTAL DEFECT  1969          Social History:     Social History     Occupational History      [employer]     Social History Main Topics     Smoking status: Never Smoker     Smokeless tobacco: Never Used     Alcohol use Yes      Comment: Glass of wine two evenings a night     Drug use: No     Sexual activity: Not on file          Family History:     Family History   Problem Relation Age of Onset     Breast Cancer Mother      Hypertension Mother      Alzheimer Disease Mother      Hypertension Father      Blood Disease Father      Lymphoa     Circulatory Father      A Fib     DIABETES Paternal Grandmother    Has a paternal relative with RA.         Allergies:     Allergies   Allergen Reactions     Blood Transfusion Related (Informational Only) Hives     Hives with platelets. Give benadryl premedication.     Metoprolol Other (See Comments)     Pt and  report that metoprolol does not work for her and also reports feeling unwell with this medication. She has been able to tolerate atenolol, which  as worked in controlling her HR.      No Clinical Screening - See Comments      Penicillin Allergy Skin Test not performed, see antimicrobial management team progress note 7/5/17.       Penicillins      Tape [Adhesive Tape] Rash          Medications:     Current Outpatient Prescriptions   Medication Sig Dispense Refill     predniSONE (DELTASONE) 5 MG tablet Take 1 tablet (5 mg) by mouth daily 90 tablet 2     hydroxychloroquine (PLAQUENIL) 200 MG tablet Take 1 tablet (200 mg) by mouth 2 times daily 60 tablet 5     acyclovir (ZOVIRAX) 400 MG tablet Take 1 tablet (400 mg) by mouth 2 times daily 60 tablet 1     atenolol (TENORMIN) 25 MG tablet Take 1 tablet (25 mg) by mouth 2 times daily 60 tablet 1     fluconazole (DIFLUCAN) 100 MG tablet Take 1 tablet (100 mg) by mouth daily 30 tablet 1     gabapentin (NEURONTIN) 100 MG capsule Take 2 capsules (200 mg) by mouth 3 times daily 180 capsule 3     potassium chloride (KLOR-CON) 20 MEQ Packet Take 10 mEq by mouth daily 15 tablet 3     digoxin (LANOXIN) 125 MCG tablet Take 1 tablet (125 mcg) by mouth daily 30 tablet 3     enoxaparin (LOVENOX) 40 MG/0.4ML injection Inject 0.4 mLs (40 mg) Subcutaneous daily 30 Syringe 3     potassium & sodium phosphates (NEUTRA-PHOS) 280-160-250 MG Packet Take 1 packet by mouth 2 times daily 60 packet 1     tolterodine (DETROL LA) 4 MG 24 hr capsule Take 1 capsule (4 mg) by mouth daily 30 capsule 5     HYDROCORTISONE PO Take 100 mg by mouth IV       magic mouthwash suspension (diphenhydrAMINE, lidocaine, aluminum-magnesium & simethicone) Swish and swallow 10 mLs in mouth 2 times daily       traMADol (ULTRAM) 50 MG tablet Take 1 tablet (50 mg) by mouth every 6 hours as needed for moderate pain 30 tablet 0     acetaminophen (TYLENOL) 325 MG tablet Take 2 tablets (650 mg) by mouth every 4 hours as needed for mild pain, fever or headaches       calcium polycarbophil (FIBERCON) 625 MG tablet Take 1 tablet by mouth 2 times daily       calcium-vitamin  "D (CALTRATE) 600-400 MG-UNIT per tablet Take 2 tablets by mouth every morning 60 tablet 0     multivitamin, therapeutic with minerals (THERA-VIT-M) TABS tablet Take 1 tablet by mouth daily 30 each 0     ascorbic acid 500 MG TABS Take 1 tablet (500 mg) by mouth daily 30 tablet 0          Physical Exam:   Blood pressure 131/71, pulse 54, height 1.473 m (4' 10\"), weight 52.6 kg (116 lb), SpO2 97 %, not currently breastfeeding.  Wt Readings from Last 4 Encounters:   01/12/18 52.6 kg (116 lb)   01/08/18 51.2 kg (112 lb 12.8 oz)   12/12/17 55 kg (121 lb 4.8 oz)   12/08/17 55.1 kg (121 lb 8 oz)     Constitutional: well-developed, appearing stated age; cooperative  Eyes: normal EOM, PERRLA, vision, conjunctiva, sclera  ENT: normal external ears, nose, hearing, lips, teeth, throat; has small gingival retraction with pallor underneath 1st tooth left of midline in lower jaw; no mucous membrane lesions, normal saliva pool  Neck: no mass or thyroid enlargement  Resp: lungs clear to auscultation  CV: RRR, brisk systolic murmur in aortic position, no rubs or gallops, no edema  GI: no abdominal mass or tenderness, no organomegaly  : not tested  Lymph: no cervical, supraclavicular, or epitrochlear nodes  MS: The TMJ, neck, shoulder, elbow, wrist, MCP/PIP/DIP, spine, hip, knee, ankle, and foot MTP/IP joints were examined. Mild synovitis left wrist, MCP 2-4 left hand, PIP 3-4 left hand, MCP 3 right hand, PIP 2-3 right hand. Normal  strength. No dactylitis,  tenosynovitis, enthesopathy. Has subluxation of MCP's 2-4 bilaterally. Diminished flexion greater than extension left wrist. Right wrist partially fused.   Skin: no nail pitting, rash, nodules or lesions; diffuse alopecia; bandaged wound across medial aspect of left elbow; reviewed pictures on 's cell phone which showed 2 large (1 inch or so) eschars with yellow granulation tissue; no surrounding skin breakdown  Neuro: normal cranial nerves, ambulatory with and without " walker, sensation diminished subjectively in lower extremities  Psych: normal judgement, orientation, memory, affect         Data:     Results for orders placed or performed in visit on 12/20/17   *CBC with platelets differential   Result Value Ref Range    WBC 7.4 4.0 - 11.0 10e9/L    RBC Count 2.76 (L) 3.8 - 5.2 10e12/L    Hemoglobin 10.8 (L) 11.7 - 15.7 g/dL    Hematocrit 32.7 (L) 35.0 - 47.0 %     (H) 78 - 100 fl    MCH 39.1 (H) 26.5 - 33.0 pg    MCHC 33.0 31.5 - 36.5 g/dL    RDW 15.8 (H) 10.0 - 15.0 %    Platelet Count 71 (L) 150 - 450 10e9/L    Diff Method Manual Differential     % Neutrophils 88.7 %    % Lymphocytes 4.3 %    % Monocytes 5.2 %    % Eosinophils 0.0 %    % Basophils 0.9 %    % Metamyelocytes 0.9 %    Absolute Neutrophil 6.6 1.6 - 8.3 10e9/L    Absolute Lymphocytes 0.3 (L) 0.8 - 5.3 10e9/L    Absolute Monocytes 0.4 0.0 - 1.3 10e9/L    Absolute Eosinophils 0.0 0.0 - 0.7 10e9/L    Absolute Basophils 0.1 0.0 - 0.2 10e9/L    Absolute Metamyelocytes 0.1 (H) 0 10e9/L    Anisocytosis Slight     Polychromasia Slight     Macrocytes Present     Platelet Estimate Confirming automated cell count      Hemoglobin   Date Value Ref Range Status   01/12/2018 12.6 11.7 - 15.7 g/dL Final   12/20/2017 10.8 (L) 11.7 - 15.7 g/dL Final   12/18/2017 9.9 (L) 11.7 - 15.7 g/dL Final     Urea Nitrogen   Date Value Ref Range Status   01/12/2018 14 7 - 30 mg/dL Final   11/29/2017 14 7 - 30 mg/dL Final   11/07/2017 13 7 - 30 mg/dL Final     Sed Rate   Date Value Ref Range Status   01/12/2018 12 0 - 30 mm/h Final   06/19/2017 63 (H) 0 - 30 mm/h Final   05/30/2017 34 (H) 0 - 30 mm/h Final     CRP Inflammation   Date Value Ref Range Status   01/12/2018 6.7 0.0 - 8.0 mg/L Final   07/14/2017 83.0 (H) 0.0 - 8.0 mg/L Final   07/12/2017 95.0 (H) 0.0 - 8.0 mg/L Final     AST   Date Value Ref Range Status   01/12/2018 50 (H) 0 - 45 U/L Final   11/29/2017 50 (H) 0 - 45 U/L Final   11/07/2017 32 0 - 45 U/L Final     Albumin   Date  Value Ref Range Status   01/12/2018 3.8 3.4 - 5.0 g/dL Final   11/29/2017 3.4 3.4 - 5.0 g/dL Final   11/07/2017 3.6 3.4 - 5.0 g/dL Final     Alkaline Phosphatase   Date Value Ref Range Status   01/12/2018 133 40 - 150 U/L Final   11/29/2017 96 40 - 150 U/L Final   11/07/2017 110 40 - 150 U/L Final     ALT   Date Value Ref Range Status   01/12/2018 61 (H) 0 - 50 U/L Final   11/29/2017 52 (H) 0 - 50 U/L Final   11/07/2017 44 0 - 50 U/L Final     Rheumatoid Factor   Date Value Ref Range Status   06/10/2017 <20 <20 IU/mL Final   04/27/2016 31 (H) <20 IU/mL Final     Recent Labs   Lab Test  01/12/18   0948  12/20/17   1207  12/18/17   0855   11/29/17   0728   11/07/17   0657   05/15/17   1752   WBC  5.0  7.4  7.2   < >  6.3   < >  5.4   < >  4.2   HGB  12.6  10.8*  9.9*   < >  11.6*   < >  11.8   < >  10.9*   HCT  37.3  32.7*  30.4*   < >  34.2*   < >  35.0   < >  32.7*   MCV  116*  119*  120*   < >  118*   < >  118*   < >  108*   PLT  87*  71*  72*   < >  133*   < >  103*   < >  100*   BUN  14   --    --    --   14   --   13   < >  19   TSH   --    --    --    --    --    --    --    --   1.03   AST  50*   --    --    --   50*   --   32   < >   --    ALT  61*   --    --    --   52*   --   44   < >   --    ALKPHOS  133   --    --    --   96   --   110   < >   --     < > = values in this interval not displayed.     Results for FIDELIA MIDDLETON (MRN [id number]) as of 9/15/2017 11:21   Ref. Range 6/10/2017 08:03   Cyclic Citrullinated Peptide Antibody, IgG Latest Ref Range: <7 U/mL 331 (H)     Reviewed Rheumatology lab flowsheet

## 2018-01-12 NOTE — LETTER
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 A little surprising given she received rituximab in late November. Suspect she has some chronic degenerative changes to her hands, definitely her wrists, that exacerbate low levels of inflammatory pain but that are improved with prednisone. Will plan to treat symptomatically until status of lymphoma and future therapeutic plans more clear.    -restart prednisone 5 mg daily and  mg bid; will need eye exam, to notify the clinic in the event her symptoms are not adequately controlled with this regimen; I would be reluctant to pursue higher dose steroids given her left elbow wounds that are not healing  -follow up in 3 months  -should get DEXA scan at some point to evaluate bone density and consider whether bisphosphonate indicated (had 7-8 years of Fosamax in early 2000's)    Seen and discussed with Dr. Domínguez.    Pj Cunha MD  Rheumatology Fellow  [phone]      Attending Note: I saw and evaluated the patient with Dr. Cunha. I agree with the assessment and plan.     Maribell Domínguez MD    Orders Placed This Encounter   Procedures      Comprehensive metabolic panel     CBC with platelets differential     Erythrocyte sedimentation rate auto     CRP inflammation                 Active Problem List:     Patient Active Problem List    Diagnosis Date Noted     H/O cardiac arrest 09/26/2017     Priority: Medium     DLBCL (diffuse large B cell lymphoma) (H) 09/07/2017     Priority: Medium     Physical deconditioning 08/12/2017     Priority: Medium     Febrile neutropenia (H) 08/08/2017     Priority: Medium     Diffuse large B cell lymphoma (H) 08/04/2017     Priority: Medium     Diffuse large B-cell lymphoma of extranodal site (H) 07/27/2017     Priority: Medium     Sepsis (H) 06/19/2017     Priority: Medium     Wound of left upper extremity 06/19/2017     Priority: Medium     Critical illness myopathy 06/16/2017     Priority: Medium     Acute respiratory failure with hypoxia (H) 06/09/2017     Priority: Medium     Hypertension      Priority: Medium     Cardiogenic shock (H) 05/29/2017     Priority: Medium     Atrial tachycardia (H) 05/16/2017     Priority: Medium     Lymphedema of both lower extremities 04/04/2017     Priority: Medium     Other rheumatoid arthritis with rheumatoid factor of multiple sites 05/02/2016     Priority: Medium     Hyperlipidemia LDL goal <130 02/22/2011     Priority: Medium     HTN (hypertension)      Priority: Medium     Primary pulmonary hypertension (H)      Priority: Medium     Seeing [facility].        Health Care Home 08/21/2017     Priority: Low     *See Letters for [facility] Care Plan: My Access Plan              History of Present Illness:   Amelia Michel is a 56 year old female who presents for evaluation of longstanding rheumatoid arthritis. We initially met when she was hospitalized in July after a cardiac arrest that at the time was of unclear etiology. She was admitted in late May after an out of hospital arrest with ROSC in the field. She was initially in atrial fibrillation and given multiple IV medications  including some amiodarone and developed what sounds like a wide complex tachycardia and was shocked out of it. During her hospitalization she had severe cytopenias requiring transfusions as well as fevers of unknown origin. She ultimately had a bone marrow and liver bioies which showed diffuse large B cell lymphoma. A PET scan showed extensive involvement including a possible cardiac lesion. She received a cycle of R-EPOCH 7/27/17 that was complicated by severe cytopenias requiring transfusions and a rehab stay. Her LP was negative for CNS involvement 8/23/17 and was transitioned to her first cycle of R-CHOP 8/22/17 with high-dose methotrexate on day 15 for CNS prophylaxis. She has developed a severe neuropathy in the digits of her upper and lower extremities that has been attributed to the methotrexate.     Her rheumatoid arthritis had been managed by Dr. Dumas for many years. She had relatively inactive disease prior to her arrest but was on methotrexate 10 mg PO per week, Arava 10 mg daily, Plaquenil 200 mg twice per day, and prednisone 3 mg daily. She has had partial fusion of her right wrist. After discharge from the hospital she was put on prednisone 5 mg daily as monotherapy. Her disease was completely inactive in the setting of her chemotherapy. Prednisone and Plaquenil discontinued on account of chemotherapy.    Last seen: 9/15/17    Interim history: Mrs. Michel returns today because she has noted pain and warmth in her hands and wrists consistent with her prior active rheumatoid arthritis. She has been off systemic therapy since early December and has noted morning stiffness that lasts all day. She inquires about going back on some prednisone. She has a PET scan on Monday and is unclear what her future therapies will be if any. She did unfortunately develop neuropathy in her right foot after intrathecal cytarabine treatment. It has not gotten any better and she is wearing a brace.          Review of  Systems (other than HPI):   Constitutional: negative  Skin: negative  Eyes: negative  Ears/Nose/Throat: negative  Respiratory: negative  Cardiovascular: negative  Gastrointestinal: negative  Genitourinary: negative  Musculoskeletal: negative  Neurologic: negative  Psychiatric: negative  Hematologic/Lymphatic/Immunologic: negative  Endocrine: negative          Past Medical History:     Past Medical History:   Diagnosis Date     Atrial fibrillation (H)      Cancer (H)     lymphoma     Cardiac arrest (H)      Hypertension      Neuropathy      Rheumatoid arthritis(714.0)      Past Surgical History:   Procedure Laterality Date     ANESTHESIA CARDIOVERSION N/A 5/17/2017    Procedure: ANESTHESIA CARDIOVERSION;  ANESTHESIA CARDIOVERSION;  Surgeon: GENERIC ANESTHESIA PROVIDER;  Location: [facility] OR     ARTHRODESIS WRIST  2000    Right wrist     BONE MARROW ASPIRATE &BIOPSY  7/12/2017          FOOT SURGERY      4 left and 2 right     RELEASE CARPAL TUNNEL BILATERAL       REPAIR VENTRICULAR SEPTAL DEFECT  1969          Social History:     Social History     Occupational History      [employer]     Social History Main Topics     Smoking status: Never Smoker     Smokeless tobacco: Never Used     Alcohol use Yes      Comment: Glass of wine two evenings a night     Drug use: No     Sexual activity: Not on file          Family History:     Family History   Problem Relation Age of Onset     Breast Cancer Mother      Hypertension Mother      Alzheimer Disease Mother      Hypertension Father      Blood Disease Father      Lymphoa     Circulatory Father      A Fib     DIABETES Paternal Grandmother    Has a paternal relative with RA.         Allergies:     Allergies   Allergen Reactions     Blood Transfusion Related (Informational Only) Hives     Hives with platelets. Give benadryl premedication.     Metoprolol Other (See Comments)     Pt and  report that metoprolol does not work for her and also reports  feeling unwell with this medication. She has been able to tolerate atenolol, which as worked in controlling her HR.      No Clinical Screening - See Comments      Penicillin Allergy Skin Test not performed, see antimicrobial management team progress note 7/5/17.       Penicillins      Tape [Adhesive Tape] Rash          Medications:     Current Outpatient Prescriptions   Medication Sig Dispense Refill     predniSONE (DELTASONE) 5 MG tablet Take 1 tablet (5 mg) by mouth daily 90 tablet 2     hydroxychloroquine (PLAQUENIL) 200 MG tablet Take 1 tablet (200 mg) by mouth 2 times daily 60 tablet 5     acyclovir (ZOVIRAX) 400 MG tablet Take 1 tablet (400 mg) by mouth 2 times daily 60 tablet 1     atenolol (TENORMIN) 25 MG tablet Take 1 tablet (25 mg) by mouth 2 times daily 60 tablet 1     fluconazole (DIFLUCAN) 100 MG tablet Take 1 tablet (100 mg) by mouth daily 30 tablet 1     gabapentin (NEURONTIN) 100 MG capsule Take 2 capsules (200 mg) by mouth 3 times daily 180 capsule 3     potassium chloride (KLOR-CON) 20 MEQ Packet Take 10 mEq by mouth daily 15 tablet 3     digoxin (LANOXIN) 125 MCG tablet Take 1 tablet (125 mcg) by mouth daily 30 tablet 3     enoxaparin (LOVENOX) 40 MG/0.4ML injection Inject 0.4 mLs (40 mg) Subcutaneous daily 30 Syringe 3     potassium & sodium phosphates (NEUTRA-PHOS) 280-160-250 MG Packet Take 1 packet by mouth 2 times daily 60 packet 1     tolterodine (DETROL LA) 4 MG 24 hr capsule Take 1 capsule (4 mg) by mouth daily 30 capsule 5     HYDROCORTISONE PO Take 100 mg by mouth IV       magic mouthwash suspension (diphenhydrAMINE, lidocaine, aluminum-magnesium & simethicone) Swish and swallow 10 mLs in mouth 2 times daily       traMADol (ULTRAM) 50 MG tablet Take 1 tablet (50 mg) by mouth every 6 hours as needed for moderate pain 30 tablet 0     acetaminophen (TYLENOL) 325 MG tablet Take 2 tablets (650 mg) by mouth every 4 hours as needed for mild pain, fever or headaches       calcium polycarbophil  "(FIBERCON) 625 MG tablet Take 1 tablet by mouth 2 times daily       calcium-vitamin D (CALTRATE) 600-400 MG-UNIT per tablet Take 2 tablets by mouth every morning 60 tablet 0     multivitamin, therapeutic with minerals (THERA-VIT-M) TABS tablet Take 1 tablet by mouth daily 30 each 0     ascorbic acid 500 MG TABS Take 1 tablet (500 mg) by mouth daily 30 tablet 0          Physical Exam:   Blood pressure 131/71, pulse 54, height 1.473 m (4' 10\"), weight 52.6 kg (116 lb), SpO2 97 %, not currently breastfeeding.  Wt Readings from Last 4 Encounters:   01/12/18 52.6 kg (116 lb)   01/08/18 51.2 kg (112 lb 12.8 oz)   12/12/17 55 kg (121 lb 4.8 oz)   12/08/17 55.1 kg (121 lb 8 oz)     Constitutional: well-developed, appearing stated age; cooperative  Eyes: normal EOM, PERRLA, vision, conjunctiva, sclera  ENT: normal external ears, nose, hearing, lips, teeth, throat; has small gingival retraction with pallor underneath 1st tooth left of midline in lower jaw; no mucous membrane lesions, normal saliva pool  Neck: no mass or thyroid enlargement  Resp: lungs clear to auscultation  CV: RRR, brisk systolic murmur in aortic position, no rubs or gallops, no edema  GI: no abdominal mass or tenderness, no organomegaly  : not tested  Lymph: no cervical, supraclavicular, or epitrochlear nodes  MS: The TMJ, neck, shoulder, elbow, wrist, MCP/PIP/DIP, spine, hip, knee, ankle, and foot MTP/IP joints were examined. Mild synovitis left wrist, MCP 2-4 left hand, PIP 3-4 left hand, MCP 3 right hand, PIP 2-3 right hand. Normal  strength. No dactylitis,  tenosynovitis, enthesopathy. Has subluxation of MCP's 2-4 bilaterally. Diminished flexion greater than extension left wrist. Right wrist partially fused.   Skin: no nail pitting, rash, nodules or lesions; diffuse alopecia; bandaged wound across medial aspect of left elbow; reviewed pictures on 's cell phone which showed 2 large (1 inch or so) eschars with yellow granulation tissue; no " surrounding skin breakdown  Neuro: normal cranial nerves, ambulatory with and without walker, sensation diminished subjectively in lower extremities  Psych: normal judgement, orientation, memory, affect         Data:     Results for orders placed or performed in visit on 12/20/17   *CBC with platelets differential   Result Value Ref Range    WBC 7.4 4.0 - 11.0 10e9/L    RBC Count 2.76 (L) 3.8 - 5.2 10e12/L    Hemoglobin 10.8 (L) 11.7 - 15.7 g/dL    Hematocrit 32.7 (L) 35.0 - 47.0 %     (H) 78 - 100 fl    MCH 39.1 (H) 26.5 - 33.0 pg    MCHC 33.0 31.5 - 36.5 g/dL    RDW 15.8 (H) 10.0 - 15.0 %    Platelet Count 71 (L) 150 - 450 10e9/L    Diff Method Manual Differential     % Neutrophils 88.7 %    % Lymphocytes 4.3 %    % Monocytes 5.2 %    % Eosinophils 0.0 %    % Basophils 0.9 %    % Metamyelocytes 0.9 %    Absolute Neutrophil 6.6 1.6 - 8.3 10e9/L    Absolute Lymphocytes 0.3 (L) 0.8 - 5.3 10e9/L    Absolute Monocytes 0.4 0.0 - 1.3 10e9/L    Absolute Eosinophils 0.0 0.0 - 0.7 10e9/L    Absolute Basophils 0.1 0.0 - 0.2 10e9/L    Absolute Metamyelocytes 0.1 (H) 0 10e9/L    Anisocytosis Slight     Polychromasia Slight     Macrocytes Present     Platelet Estimate Confirming automated cell count      Hemoglobin   Date Value Ref Range Status   01/12/2018 12.6 11.7 - 15.7 g/dL Final   12/20/2017 10.8 (L) 11.7 - 15.7 g/dL Final   12/18/2017 9.9 (L) 11.7 - 15.7 g/dL Final     Urea Nitrogen   Date Value Ref Range Status   01/12/2018 14 7 - 30 mg/dL Final   11/29/2017 14 7 - 30 mg/dL Final   11/07/2017 13 7 - 30 mg/dL Final     Sed Rate   Date Value Ref Range Status   01/12/2018 12 0 - 30 mm/h Final   06/19/2017 63 (H) 0 - 30 mm/h Final   05/30/2017 34 (H) 0 - 30 mm/h Final     CRP Inflammation   Date Value Ref Range Status   01/12/2018 6.7 0.0 - 8.0 mg/L Final   07/14/2017 83.0 (H) 0.0 - 8.0 mg/L Final   07/12/2017 95.0 (H) 0.0 - 8.0 mg/L Final     AST   Date Value Ref Range Status   01/12/2018 50 (H) 0 - 45 U/L Final    11/29/2017 50 (H) 0 - 45 U/L Final   11/07/2017 32 0 - 45 U/L Final     Albumin   Date Value Ref Range Status   01/12/2018 3.8 3.4 - 5.0 g/dL Final   11/29/2017 3.4 3.4 - 5.0 g/dL Final   11/07/2017 3.6 3.4 - 5.0 g/dL Final     Alkaline Phosphatase   Date Value Ref Range Status   01/12/2018 133 40 - 150 U/L Final   11/29/2017 96 40 - 150 U/L Final   11/07/2017 110 40 - 150 U/L Final     ALT   Date Value Ref Range Status   01/12/2018 61 (H) 0 - 50 U/L Final   11/29/2017 52 (H) 0 - 50 U/L Final   11/07/2017 44 0 - 50 U/L Final     Rheumatoid Factor   Date Value Ref Range Status   06/10/2017 <20 <20 IU/mL Final   04/27/2016 31 (H) <20 IU/mL Final     Recent Labs   Lab Test  01/12/18   0948  12/20/17   1207  12/18/17   0855   11/29/17   0728   11/07/17   0657   05/15/17   1752   WBC  5.0  7.4  7.2   < >  6.3   < >  5.4   < >  4.2   HGB  12.6  10.8*  9.9*   < >  11.6*   < >  11.8   < >  10.9*   HCT  37.3  32.7*  30.4*   < >  34.2*   < >  35.0   < >  32.7*   MCV  116*  119*  120*   < >  118*   < >  118*   < >  108*   PLT  87*  71*  72*   < >  133*   < >  103*   < >  100*   BUN  14   --    --    --   14   --   13   < >  19   TSH   --    --    --    --    --    --    --    --   1.03   AST  50*   --    --    --   50*   --   32   < >   --    ALT  61*   --    --    --   52*   --   44   < >   --    ALKPHOS  133   --    --    --   96   --   110   < >   --     < > = values in this interval not displayed.     Results for EMI FIDELIA EMELYN (MRN [id number]) as of 9/15/2017 11:21   Ref. Range 6/10/2017 08:03   Cyclic Citrullinated Peptide Antibody, IgG Latest Ref Range: <7 U/mL 331 (H)     Reviewed Rheumatology lab flowsheet    Pj Cunha MD, MD

## 2018-01-12 NOTE — MR AVS SNAPSHOT
After Visit Summary   1/12/2018    Amelia Michel    MRN: [id number]           Patient Information     Date Of Birth          1960        Visit Information        Provider Department      1/12/2018 8:00 AM Pj Cunha MD [facility] Rheumatology        Today's Diagnoses     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 (H)    -  1      Care Instructions    Consider seeing neurology again for your right foot.    Start prednisone, let me know if you have problems with it or need more medication.    Labs today.                Follow-ups after your visit        Follow-up notes from your care team     Return in about 3 months (around 4/12/2018).      Your next 10 appointments already scheduled     Eric 15, 2018  8:15 AM CST   PET ONCOLOGY WHOLE BODY with UUPET1   [facility] [place] PET CT ([facility], [facility])    [address] [address] [phone]           Tell your doctor:   If there is any chance you may be pregnant or if you are breastfeeding.   If you have problems lying in small spaces (claustrophobia). If you do, your doctor may give you medicine to help you relax. If you have diabetes:   Have your exam early in the morning. Your blood glucose will go up as the day goes by.   Your glucose level must be 180 or less at the start of the exam. Please take any medicines you need to ensure this blood glucose level. 24 hours before your scan: Don t do any heavy exercise. (No jogging, aerobics or other workouts.) Exercise will make your pictures less accurate. 6 hours before your scan:   Stop all food and liquids (except water).   Do not chew gum or suck on mints.   If you need to take medicine with food, you may take it with a few crackers.  Please call your Imaging Department at your exam site with any questions.            Jan 18, 2018  2:30 PM CST   RETURN WOUND NURSE VISIT with ABBEY Smith  Health Wound Ostomy ([facility] [place])    [address]  4th Floor  [place] [address]   [phone]            Jan 18, 2018  3:30 PM CST   RETURN ONC with Lenore Rodriguez MD   [facility] Blood and Marrow Transplant ([facility] [place])    [address]  [address]  [place] [address]   [phone]            Jan 24, 2018  1:00 PM CST   (Arrive by 12:45 PM)   RETURN ARRHYTHMIA with Juan Eaton MD   [facility] Heart Care ([facility] [place])    [address]  [address]  [place] [address]   [phone]            Feb 13, 2018 12:30 PM CST   (Arrive by 12:15 PM)   New Plastic Surgery with Debi Jones MD   [facility] Plastic and Reconstructive Surgery ([facility])    [address]  4th Floor  [place] [id number] [phone]           Do not wear perfume.            Mar 01, 2018  9:00 AM CST   (Arrive by 8:45 AM)   Urodynamics with Archana Green PA-C   [facility] Urology and Inst for Prostate and Urologic Cancers ([facility])    [address]  4th Floor  [place] [id number] [phone]            Apr 06, 2018  8:00 AM CDT   (Arrive by 7:45 AM)   Return Visit with Pj Cunha MD   [facility] Rheumatology ([facility] [place])    [address]  [address]  [place] [id number] [phone]              Future tests that were ordered for you today     Open Future Orders        Priority Expected Expires Ordered    Comprehensive metabolic panel Routine  1/12/2019 1/12/2018    CBC with platelets differential Routine  1/12/2019 1/12/2018    Erythrocyte sedimentation rate auto Routine  1/12/2019 1/12/2018    CRP inflammation Routine  1/12/2019 1/12/2018            Who to contact     If you have questions or need follow up information about today's clinic visit or your schedule please contact [facility] directly at [phone].  Normal or non-critical lab and imaging results will be communicated to you by MyChart, letter or phone within 4 business days after the clinic has received the results. If you do not hear from us within 7 days, please contact the clinic through [employer] or phone. If you have a critical or abnormal lab result, we will notify you by phone as soon as possible.  Submit refill requests through [employer] or call your pharmacy and they will forward the refill request to us. Please allow 3 business days for your refill to be completed.          Additional Information About Your Visit        [facility] Information     [employer] gives you secure access to your electronic health record. If you see a primary care provider, you can also send messages to your care team and make appointments. If you have questions, please call your primary care clinic.  If you do not have a primary care provider, please call [phone] and they will assist you.        Care EveryWhere ID     This is your Care EveryWhere ID. This could be used by other organizations to access your [place] medical records  [id number]        Your Vitals Were     Pulse Height Pulse Oximetry BMI (Body Mass Index)          54 1.473 m (4' 10\") 97% 24.24 kg/m2         Blood Pressure from Last 3 Encounters:   01/12/18 131/71   01/08/18 124/75   12/18/17 (P) 123/59    Weight from Last 3 Encounters:   01/12/18 52.6 kg (116 lb)   01/08/18 51.2 kg (112 lb 12.8 oz)   12/12/17 55 kg (121 lb 4.8 oz)                 Today's Medication Changes          These changes are accurate as of: 1/12/18  9:30 AM.  If you have any questions, ask your nurse or doctor.               Start taking these medicines.        Dose/Directions    hydroxychloroquine 200 MG tablet   Commonly known as:  PLAQUENIL   Used for:  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 (H)   Started by:  Pj Cunha MD        Dose:  200 mg   Take 1 " tablet (200 mg) by mouth 2 times daily   Quantity:  60 tablet   Refills:  5       predniSONE 5 MG tablet   Commonly known as:  DELTASONE   Used for:  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 (H)   Started by:  Pj Cunha MD        Dose:  5 mg   Take 1 tablet (5 mg) by mouth daily   Quantity:  90 tablet   Refills:  2         Stop taking these medicines if you haven't already. Please contact your care team if you have questions.     BENADRYL ALLERGY PO   Stopped by:  Pj Cunha MD           levofloxacin 250 MG tablet   Commonly known as:  LEVAQUIN   Stopped by:  Pj Cunha MD           ondansetron 8 MG ODT tab   Commonly known as:  ZOFRAN-ODT   Stopped by:  Pj Cunha MD                Where to get your medicines      These medications were sent to [facility] [address] [address]    Hours:  TRANSPLANT PHONE NUMBER [phone] Phone:  [phone]     hydroxychloroquine 200 MG tablet    predniSONE 5 MG tablet                Primary Care Provider Office Phone # Fax #    Comfort Sabillon [phone] [address] [facility] [address]        Equal Access to Services     CATINA HEAD AH: Hadii laurita ku hadasho Soomaali, waaxda luqadaha, qaybta kaalmada adeegyada, waxay ednain hayfloydn jesica inman. So [facility] [phone].    ATENCIÓN: Si habla español, tiene a sarmiento disposición servicios gratuitos de asistencia lingüística. Llame al [phone].    We comply with applicable federal civil rights laws and [place] laws. We do not discriminate on the basis of race, color, national origin, age, disability, sex, sexual orientation, or gender identity.            Thank you!     Thank you for choosing [facility]  for your care. Our goal is always to provide you with excellent care. Hearing back from our patients is one way we can continue to  improve our services. Please take a few minutes to complete the written survey that you may receive in the mail after your visit with us. Thank you!             Your Updated Medication List - Protect others around you: Learn how to safely use, store and throw away your medicines at www.disposemymeds.org.          This list is accurate as of: 1/12/18  9:30 AM.  Always use your most recent med list.                   Brand Name Dispense Instructions for use Diagnosis    acetaminophen 325 MG tablet    TYLENOL     Take 2 tablets (650 mg) by mouth every 4 hours as needed for mild pain, fever or headaches    Diffuse large B-cell lymphoma of extranodal site (H)       acyclovir 400 MG tablet    ZOVIRAX    60 tablet    Take 1 tablet (400 mg) by mouth 2 times daily    Diffuse large B-cell lymphoma, unspecified body region (H)       ascorbic acid 500 MG Tabs     30 tablet    Take 1 tablet (500 mg) by mouth daily    Diffuse large B-cell lymphoma, unspecified body region (H)       atenolol 25 MG tablet    TENORMIN    60 tablet    Take 1 tablet (25 mg) by mouth 2 times daily    Atrial tachycardia (H)       calcium polycarbophil 625 MG tablet    FIBERCON     Take 1 tablet by mouth 2 times daily        calcium-vitamin D 600-400 MG-UNIT per tablet    CALTRATE    60 tablet    Take 2 tablets by mouth every morning    Diffuse large B-cell lymphoma, unspecified body region (H)       digoxin 125 MCG tablet    LANOXIN    30 tablet    Take 1 tablet (125 mcg) by mouth daily    Atrial tachycardia (H)       enoxaparin 40 MG/0.4ML injection    LOVENOX    30 Syringe    Inject 0.4 mLs (40 mg) Subcutaneous daily    VTE (venous thromboembolism), Paroxysmal atrial fibrillation (H)       fluconazole 100 MG tablet    DIFLUCAN    30 tablet    Take 1 tablet (100 mg) by mouth daily    Diffuse large B-cell lymphoma, unspecified body region (H)       gabapentin 100 MG capsule    NEURONTIN    180 capsule    Take 2 capsules (200 mg) by mouth 3 times daily     Critical illness myopathy       HYDROCORTISONE PO      Take 100 mg by mouth IV        hydroxychloroquine 200 MG tablet    PLAQUENIL    60 tablet    Take 1 tablet (200 mg) by mouth 2 times daily    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 (H)       lidocaine visc 2% 2.5mL/5mL & maalox/mylanta w/ simeth 2.5mL/5mL & diphenhydrAMINE 5mg/5mL Susp suspension    [facility]     Swish and swallow 10 mLs in mouth 2 times daily        multivitamin, therapeutic with minerals Tabs tablet     30 each    Take 1 tablet by mouth daily    Diffuse large B-cell lymphoma, unspecified body region (H)       potassium & sodium phosphates 280-160-250 MG Packet    NEUTRA-PHOS    60 packet    Take 1 packet by mouth 2 times daily    Hypophosphatemia       potassium chloride 20 MEQ Packet    KLOR-CON    15 tablet    Take 10 mEq by mouth daily    Hypokalemia       predniSONE 5 MG tablet    DELTASONE    90 tablet    Take 1 tablet (5 mg) by mouth daily    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 (H)       tolterodine 4 MG 24 hr capsule    DETROL LA    30 capsule    Take 1 capsule (4 mg) by mouth daily    Urinary urgency       traMADol 50 MG tablet    ULTRAM    30 tablet    Take 1 tablet (50 mg) by mouth every 6 hours as needed for moderate pain    Diffuse large B-cell lymphoma of extranodal site (H)

## 2018-01-12 NOTE — PATIENT INSTRUCTIONS
Consider seeing neurology again for your right foot.    Start prednisone, let me know if you have problems with it or need more medication.    Labs today.

## 2018-01-15 ENCOUNTER — HOSPITAL ENCOUNTER (OUTPATIENT)
Dept: PET IMAGING | Facility: CLINIC | Age: 58
End: 2018-01-15
Attending: PHYSICIAN ASSISTANT
Payer: COMMERCIAL

## 2018-01-15 ENCOUNTER — HOSPITAL ENCOUNTER (OUTPATIENT)
Dept: PET IMAGING | Facility: CLINIC | Age: 58
Discharge: HOME | End: 2018-01-15
Attending: PHYSICIAN ASSISTANT | Admitting: PHYSICIAN ASSISTANT
Payer: COMMERCIAL

## 2018-01-15 DIAGNOSIS — C83.398 DIFFUSE LARGE B-CELL LYMPHOMA OF EXTRANODAL SITE: ICD-10-CM

## 2018-01-15 LAB — GLUCOSE BLDC GLUCOMTR-MCNC: 107 MG/DL (ref 70–99)

## 2018-01-15 PROCEDURE — A9552 F18 FDG: HCPCS | Performed by: PHYSICIAN ASSISTANT

## 2018-01-15 PROCEDURE — 70491 CT SOFT TISSUE NECK W/DYE: CPT

## 2018-01-15 PROCEDURE — 34300033 ZZH RX 343: Performed by: PHYSICIAN ASSISTANT

## 2018-01-15 PROCEDURE — 82962 GLUCOSE BLOOD TEST: CPT

## 2018-01-15 PROCEDURE — 25000128 H RX IP 250 OP 636: Performed by: PHYSICIAN ASSISTANT

## 2018-01-15 PROCEDURE — 74177 CT ABD & PELVIS W/CONTRAST: CPT

## 2018-01-15 RX ORDER — IOPAMIDOL 755 MG/ML
60-135 INJECTION, SOLUTION INTRAVASCULAR ONCE
Status: COMPLETED | OUTPATIENT
Start: 2018-01-15 | End: 2018-01-15

## 2018-01-15 RX ADMIN — IOPAMIDOL 72 ML: 755 INJECTION, SOLUTION INTRAVENOUS at 09:47

## 2018-01-15 RX ADMIN — FLUDEOXYGLUCOSE F-18 10.2 MCI.: 500 INJECTION, SOLUTION INTRAVENOUS at 08:41

## 2018-01-17 ENCOUNTER — CARE COORDINATION (OUTPATIENT)
Dept: CARDIOLOGY | Facility: CLINIC | Age: 58
End: 2018-01-17

## 2018-01-17 NOTE — PROGRESS NOTES
Per pharmcy recommendations, patient is to have digoxin level monitored more frequently related to interaction of hydroxychloroquine with digoxin.  Will notify provider and care coordinator.

## 2018-01-18 ENCOUNTER — OFFICE VISIT (OUTPATIENT)
Dept: WOUND CARE | Facility: CLINIC | Age: 58
End: 2018-01-18
Payer: COMMERCIAL

## 2018-01-18 ENCOUNTER — OFFICE VISIT (OUTPATIENT)
Dept: TRANSPLANT | Facility: CLINIC | Age: 58
End: 2018-01-18
Attending: INTERNAL MEDICINE
Payer: COMMERCIAL

## 2018-01-18 VITALS
WEIGHT: 111.8 LBS | BODY MASS INDEX: 23.37 KG/M2 | SYSTOLIC BLOOD PRESSURE: 157 MMHG | DIASTOLIC BLOOD PRESSURE: 86 MMHG | OXYGEN SATURATION: 97 % | HEART RATE: 63 BPM | TEMPERATURE: 97.6 F

## 2018-01-18 DIAGNOSIS — G62.9 NEUROPATHY: Primary | ICD-10-CM

## 2018-01-18 DIAGNOSIS — I10 HYPERTENSION, UNSPECIFIED TYPE: ICD-10-CM

## 2018-01-18 DIAGNOSIS — C83.398 DIFFUSE LARGE B-CELL LYMPHOMA OF EXTRANODAL SITE: ICD-10-CM

## 2018-01-18 DIAGNOSIS — S41.102A WOUND OF LEFT UPPER EXTREMITY, INITIAL ENCOUNTER: Primary | ICD-10-CM

## 2018-01-18 PROCEDURE — G0463 HOSPITAL OUTPT CLINIC VISIT: HCPCS | Mod: ZF

## 2018-01-18 PROCEDURE — 40000809 ZZH STATISTIC NO DOCUMENTATION TO SUPPORT CHARGE

## 2018-01-18 ASSESSMENT — PAIN SCALES - GENERAL: PAINLEVEL: MODERATE PAIN (4)

## 2018-01-18 NOTE — PROGRESS NOTES
[facility] Visit  1/18/2018         Disease and Treatment History:  1. Complicated patient with history of Rheumatoid Arthritis status post long term treatment with methotrexate with recent significant complicated course with cardiac arrest, admission with hypotension, sepsis, ICU stay and intubation with subsequent additional readmission from TCU in 6/2017 for FUO with extensive work-up with eventual finding of progression cytopenias with eventual lymphoma diagnosis  2. Bone Marrow Biopsy: 7/12/2017: Highly suspicious for involvement by B cell lymphoma with foci of large atypical CD20+ cells  3. PET CT 7/19/2017: Extensive activity: Right paratracheal lesion with SUV 28, many liver lesions with SUV 15, cardiac lesion intraarterial septum, numerous bone lesions.  4. 7/21 Liver Biopsy: Consistent with Diffuse Large B Cell Lymphoma non-germinal center phenotype  -- FISH for myc, bcl2, bcl6 negative for double-hit lymphoma  5. IPI based on Age < 60 (0 points) + PS 2 (1 + point) + Stage IV Disease (1 point) + Extranodal disease > 1 site (1 point) + elevated LDH (1 point) = 4 Poor Risk Disease  6. Cycle 1 given as R-EPOCH Day 1 = 7/27/2017  -- complications of transfusion dependence and need for acute rehab placement (likely more result of extensive hospital stays)  - LP negative for CNS involvement 8/23/2017  7. Cycle #2: Transition to R-CHOP Day 1 = 8/22/2017  - Day 15 high dose MTX for CNS prophylaxis  - complicated by significant fluid overload and PICC associated DVT  8. Cycle #3 Day 1 = 9/20/2017 Post Cycle 3 PET CR. Cycle 4 10/11/2017 + IT prophylaxis, Cycle 5 11/8/17, Cycle 6 11/29/2017 + IT prophylaxis      HPI:Amelia comes in today to review her PET CT results and talk through next steps. Her left arm wound dressing was changed today. There is still a tract that they are worried about and she is seeing the surgeon coming up in a few weeks. She notes decent appetite and improving energy. She notes  no chest pain or SOB, no nausea or vomiting. She notes that she had neuropathy in her feet/legs (beyond her baseline right thigh numbness and saddle paresthesias) in between her R-CHOP cycles that would improve prior to the next cycle but after the last LP with IT chemo she has had more pronounced right leg neuropathy/numbness up to the back of her calk and the bottoms of her feet.      10 point ROS otherwise negative      Physical Exam:  /86  Pulse 63  Temp 97.6  F (36.4  C) (Oral)  Wt 50.7 kg (111 lb 12.8 oz)  SpO2 97%  BMI 23.37 kg/m2  Repeat by myself 150's over 80  Gen: looks well. KPS 80  HEENT: OP clear, no erythema or thrush, no palpable MCKAYLA  Lungs:CTAB no crackles or wheezes  CV: stable loud systolic murmur. Irregular on exam today  Abd:soft  Ext:no edema. Brace on right leg      Labs:    Results for FIDELIA MIDDLETON (MRN [id number]) as of 1/18/2018 14:39   Ref. Range 1/12/2018 09:48   Sodium Latest Ref Range: 133 - 144 mmol/L 140   Potassium Latest Ref Range: 3.4 - 5.3 mmol/L 4.2   Chloride Latest Ref Range: 94 - 109 mmol/L 107   Carbon Dioxide Latest Ref Range: 20 - 32 mmol/L 29   Urea Nitrogen Latest Ref Range: 7 - 30 mg/dL 14   Creatinine Latest Ref Range: 0.52 - 1.04 mg/dL 0.54   GFR Estimate Latest Ref Range: >60 mL/min/1.7m2 >90   GFR Estimate If Black Latest Ref Range: >60 mL/min/1.7m2 >90   Calcium Latest Ref Range: 8.5 - 10.1 mg/dL 9.8   Anion Gap Latest Ref Range: 3 - 14 mmol/L 4   Albumin Latest Ref Range: 3.4 - 5.0 g/dL 3.8   Protein Total Latest Ref Range: 6.8 - 8.8 g/dL 6.8   Bilirubin Total Latest Ref Range: 0.2 - 1.3 mg/dL 0.4   Alkaline Phosphatase Latest Ref Range: 40 - 150 U/L 133   ALT Latest Ref Range: 0 - 50 U/L 61 (H)   AST Latest Ref Range: 0 - 45 U/L 50 (H)   CRP Inflammation Latest Ref Range: 0.0 - 8.0 mg/L 6.7   Glucose Latest Ref Range: 70 - 99 mg/dL 94   WBC Latest Ref Range: 4.0 - 11.0 10e9/L 5.0   Hemoglobin Latest Ref Range: 11.7 - 15.7 g/dL 12.6   Hematocrit  Latest Ref Range: 35.0 - 47.0 % 37.3   Platelet Count Latest Ref Range: 150 - 450 10e9/L 87 (L)   RBC Count Latest Ref Range: 3.8 - 5.2 10e12/L 3.21 (L)   MCV Latest Ref Range: 78 - 100 fl 116 (H)   MCH Latest Ref Range: 26.5 - 33.0 pg 39.3 (H)   MCHC Latest Ref Range: 31.5 - 36.5 g/dL 33.8   RDW Latest Ref Range: 10.0 - 15.0 % 13.2   Diff Method Unknown Manual Differential   % Neutrophils Latest Units: % 66.1   % Lymphocytes Latest Units: % 12.2   % Monocytes Latest Units: % 7.8   % Eosinophils Latest Units: % 8.7   % Basophils Latest Units: % 3.5   % Metamyelocytes Latest Units: % 1.7   Absolute Neutrophil Latest Ref Range: 1.6 - 8.3 10e9/L 3.3   Absolute Lymphocytes Latest Ref Range: 0.8 - 5.3 10e9/L 0.6 (L)   Absolute Monocytes Latest Ref Range: 0.0 - 1.3 10e9/L 0.4   Absolute Eosinophils Latest Ref Range: 0.0 - 0.7 10e9/L 0.4   Absolute Basophils Latest Ref Range: 0.0 - 0.2 10e9/L 0.2   Absolute Metamyelocytes Latest Ref Range: 0 10e9/L 0.1 (H)   RBC Morphology Unknown Normal   Platelet Estimate Unknown Confirming automa...   Sed Rate Latest Ref Range: 0 - 30 mm/h 12       Neck CT:  Impression:   1. On the fusion PET CT, there is no abnormal metabolic activity in  the mucosal space, soft tissues, or cervical prieto chains.   2. No evidence of mucosal, prieto, or soft tissue abnormality on  contrast enhanced neck CT.    C/A/P CT PET  CHEST:  There is no suspicious FDG uptake in the chest.      Surgical wires in the sternum. Calcifications in the anterior  mediastinum.     Subsegmental atelectasis in the right lower lobe.     There are no pathologically enlarged mediastinal, hilar or axillary  lymph nodes.     There are no suspicious lung nodules or evidence for infection.      There is no significant pericardial or plural effusions.     Cardiomegaly.     Multilevel old anterior rib fractures, bilaterally.     Effusion and FDG uptake in the right shoulder joint.     ABDOMEN AND PELVIS:  There is no suspicious FDG  uptake in the abdomen or pelvis.     There are no suspicious hepatic lesions. Hepatomegaly. There is no  splenomegaly or evidence for splenic or pancreatic mass lesion.  There are no suspicious adrenal mass lesions or opaque gallbladder  calculi. There is symmetric nephrographic renal phase without  hydronephrosis. Tiny bilateral renal cortical cysts.     There is no evidence for diverticulitis, bowel obstruction or free  fluid.     LOWER EXTREMITIES:   No abnormal masses or hypermetabolic lesions.     BONES:   There are no suspicious lytic or blastic osseous lesions.  There is no  abnormal FDG uptake in the skeleton. S-shaped scoliosis of the  thoracolumbar spine, right convex at thoracic and left convex at the  lumbar spine.         IMPRESSION: In this patient with history of diffuse large B-cell  lymphoma;  1. No evidence of active lymphoma, consistent with continued complete  response by strict Lugano criteria.   2. Hepatomegaly and cardiomegaly.  3. Unchanged mild effusion and associated FDG uptake in the right  shoulder joint.  4. See dedicated neuroradiology report for the results of the high  resolution PET CT of the neck.       LP 12/18/2017:  Results for FIDELIA MIDDLETON (MRN [id number]) as of 1/18/2018 14:39   Ref. Range 12/18/2017 10:35   RBC CSF Latest Ref Range: 0 - 2 /uL 1   WBC CSF Latest Ref Range: 0 - 5 /uL 0   Glucose CSF Latest Ref Range: 40 - 70 mg/dL 44   Protein Total CSF Latest Ref Range: 15 - 60 mg/dL 61 (H)     Flow:  SPECIMEN(S):   Cerebrospinal fluid     INTERPRETATION:   Cerebrospinal fluid:        Rare to absent CD19 positive B cells.     COMMENT:   The total number of cells is low limiting the number of antibodies that   can be analyzed and limiting the interpretation. Only B-cell antigens   were evaluated. Clinical correlation is recommended.     A/P: 56 yo woman with history of rhematoid arthritis and recent diagnosis of stage IVB high risk aggressive Diffuse large B cell  lymphoma    1. Lymphoma: Got cycle #1 in the hospital and I chose R-EPOCH for infusional nature due to possible intracardiac involvement and also extensive disease to allow for slower lymphoma kill. Tolerated this well, aside from significant cytopenias (which were present at diagnosis). Then transitioned to R-CHOP to finish out a total of 6 cycles of chemotherapy (1 R-EPOCH and 5 R-CHOP) with initially planned for High Dose MTX on Day 15 of cycle 2,4, and 6 due to high IPI but then transition to prophy IT chemo on cycle 4 and 6 due to toxicity with the high dose methotrexate.   -- PET CT post 3 cycles CR1 and repeat PET CT now in ongoing CR1. Plan for repeat CT scan in 3-4 months for follow-up    2. ID: . Stopped acyclovir and fluconazole prophy given good ANC recovery      3. Rheumatoid arthritis: is on prednisone 5 mg daily and recently plaquenil was added    4. CV: A. Fib. On digoxin and atenolol. Should be able to be anticoagulated at this point now given procedures are completed and platelets recovering. Previously on eloquis and I would be fine with cardiology restarting that  - history of tachyarrythmias and cardiac arrest: ICD implantation has been recommended when chemo done. She is scheduled to see them next week.    5. Wound: continues with wound care and scheduled to see surgeon in a few weeks to discuss additional options    6. Referral to Cancer Rehab for neuropathy    7. Neuropathy: new and atypical changes in right leg post last LP. Not in the distribution and not typical of lower dose IT cytarabine. Would recommend seeing Dr. Roman in neurology again to help with sorting this out    8. Disability: Paperwork filled out recommending not going back to work until reconsideration in 5/2018 due to neuropathy and ongoing wound issues    Final Plan:  - see cardiology next week  - ok to start anticoagulation for a.fib but will defer timing of this to cardiology in case procedure planned  - if surgical  intervention needed for arm wound that would be ok  - Dr. Roman Neurology apt  - cameron in 3-4 months with labs and CT scans  - cancer rehab referral    Lenore Rodriguez

## 2018-01-18 NOTE — MR AVS SNAPSHOT
After Visit Summary   1/18/2018    Amelia Michel    MRN: [id number]           Patient Information     Date Of Birth          1960        Visit Information        Provider Department      1/18/2018 3:30 PM Lenore Rodriguez MD [facility] Blood and Marrow Transplant        Today's Diagnoses     Neuropathy    -  1    Hypertension, unspecified type        Diffuse large B-cell lymphoma of extranodal site (H)              [facility] ([facility])  [address] [place] [address]  Phone: [phone]  Clinic Hours:   Monday-Thursday:7am to 7pm   Friday: 7am to 5pm   Weekends and holidays:    8am to noon (in general)  If your fever is 100.5  or greater,   call the clinic.  After hours call the   hospital at [phone] or   [phone]. Ask for the BMT   fellow on-call           Care Instructions    Ok to transition to oral blood thinners and off the lovenox     Cancer rehab referral for PT/OT    Jennifer in 3-4 months with CT scans and labs          Follow-ups after your visit        Additional Services     OCCUPATIONAL THERAPY REFERRAL       Cancer rehab referral            PHYSICAL THERAPY REFERRAL       Cancer rehab [place]                  Your next 10 appointments already scheduled     Jan 24, 2018  1:00 PM CST   (Arrive by 12:45 PM)   RETURN ARRHYTHMIA with Juan Eaton MD   [facility] ([facility])    [address] [place] [address]  [address] [place] [address] [phone]            Feb 13, 2018 12:30 PM CST   (Arrive by 12:15 PM)   New Plastic Surgery with Debi Jones MD   [facility] Plastic [facility] Reconstructive Surgery ([facility])    [address] [place] [address]  4th Floor  [facility] [address] [phone]           Do not wear perfume.            Feb 13, 2018 12:30 PM CST   RETURN WOUND NURSE VISIT with Shirley Eller RN   [facility] Wound Ostomy ([facility])    [facility]  [address]  4th Floor  [facility] [address]   [phone]            Mar 01, 2018  9:00 AM CST   (Arrive by 8:45 AM)   Urodynamics with Archana Green PA-C   [facility] Inst for Prostate and Urologic Cancers ([facility])    [address]  4th Floor  [facility] [address]   [phone]            Apr 06, 2018  8:00 AM CDT   (Arrive by 7:45 AM)   Return Visit with Pj Cunha MD   [facility] Rheumatology ([facility])    [address]  [address]  [facility] [address]   [phone]              Future tests that were ordered for you today     Open Future Orders        Priority Expected Expires Ordered    CT Chest abdomen pelvis w & w/o contrast Routine 4/23/2018 6/18/2018 1/18/2018    Comprehensive metabolic panel Routine 4/23/2018 6/18/2018 1/18/2018    CBC with platelets differential Routine 4/23/2018 6/18/2018 1/18/2018    IgG Routine 4/23/2018 6/18/2018 1/18/2018            Who to contact     If you have questions or need follow up information about today's clinic visit or your schedule please contact [facility] directly at [phone].  Normal or non-critical lab and imaging results will be communicated to you by [employer], letter or phone within 4 business days after the clinic has received the results. If you do not hear from us within 7 days, please contact the clinic through [employer] or phone. If you have a critical or abnormal lab result, we will notify you by phone as soon as possible.  Submit refill requests through [employer] or call your pharmacy and they will forward the refill request to us. Please allow 3 business days for your refill to be completed.          Additional Information About Your Visit        [employer] Information     Each[employer] gives you secure access to your electronic health record. If you see a primary care provider, you can also send messages to your care team and  make appointments. If you have questions, please call your primary care clinic.  If you do not have a primary care provider, please call [phone] and they will assist you.        Care EveryWhere ID     This is your Care EveryWhere ID. This could be used by other organizations to access your [place] medical records  [id number]        Your Vitals Were     Pulse Temperature Pulse Oximetry BMI (Body Mass Index)          63 97.6  F (36.4  C) (Oral) 97% 23.37 kg/m2         Blood Pressure from Last 3 Encounters:   01/18/18 157/86   01/12/18 131/71   01/08/18 124/75    Weight from Last 3 Encounters:   01/18/18 50.7 kg (111 lb 12.8 oz)   01/12/18 52.6 kg (116 lb)   01/08/18 51.2 kg (112 lb 12.8 oz)              We Performed the Following     OCCUPATIONAL THERAPY REFERRAL     PHYSICAL THERAPY REFERRAL          Today's Medication Changes          These changes are accurate as of: 1/18/18  4:11 PM.  If you have any questions, ask your nurse or doctor.               Start taking these medicines.        Dose/Directions    order for DME   Used for:  Hypertension, unspecified type   Started by:  Lenore Rodriguez MD        Equipment being ordered: BP cuff   Quantity:  1 Device   Refills:  1         Stop taking these medicines if you haven't already. Please contact your care team if you have questions.     acyclovir 400 MG tablet   Commonly known as:  ZOVIRAX   Stopped by:  Lenore Rodriguez MD           fluconazole 100 MG tablet   Commonly known as:  DIFLUCAN   Stopped by:  Lenore Rodriguez MD                Where to get your medicines      Some of these will need a paper prescription and others can be bought over the counter.  Ask your nurse if you have questions.     Bring a paper prescription for each of these medications     order for DME                Recent Review Flowsheet Data     BMT Recent Results Latest Ref Rng & Units 12/5/2017 12/8/2017 12/12/2017 12/18/2017 12/20/2017 1/12/2018 1/15/2018    WBC 4.0 -  11.0 10e9/L 0.5(LL) 3.7(L) 6.1 7.2 7.4 5.0 -    Hemoglobin 11.7 - 15.7 g/dL 10.1(L) 9.9(L) 9.3(L) 9.9(L) 10.8(L) 12.6 -    Platelet Count 150 - 450 10e9/L 25(LL) 13(LL) 61(L) 72(L) 71(L) 87(L) -    Platelets 150 - 450 10:9/L - - - - - - -    Neutrophils (Absolute) 1.6 - 8.3 10e9/L 0.3(LL) 2.6 5.3 - 6.6 3.3 -    INR 0.86 - 1.14 - - - - - - -    Sodium 133 - 144 mmol/L - - - - - 140 -    Potassium 3.4 - 5.3 mmol/L - - - - - 4.2 -    Chloride 94 - 109 mmol/L - - - - - 107 -    Glucose 70 - 99 mg/dL - - - - - 94 107(H)    Urea Nitrogen 7 - 30 mg/dL - - - - - 14 -    Creatinine 0.52 - 1.04 mg/dL - - - - - 0.54 -    Calcium (Total) 8.5 - 10.1 mg/dL - - - - - 9.8 -    Protein (Total) 6.8 - 8.8 g/dL - - - - - 6.8 -    Albumin 3.4 - 5.0 g/dL - - - - - 3.8 -    Bilirubin (Direct) 0.0 - 0.2 mg/dL - - - - - - -    Alkaline Phosphatase 40 - 150 U/L - - - - - 133 -    AST 0 - 45 U/L - - - - - 50(H) -    ALT 0 - 50 U/L - - - - - 61(H) -    MCV 78 - 100 fl 117(H) 116(H) 117(H) 120(H) 119(H) 116(H) -               Primary Care Provider Office Phone # Fax #    Comfort Sabillon [phone] [address]  [place] [address]        Equal Access to Services     [facility]: jairon Nayakda luqadaha, krystle ramirezmaalana bae, durga inman. So [facility] [phone].    ATENCIÓN: Si habla español, tiene a sarmiento disposición servicios gratuitos de asistencia lingüística. Llame al [phone].    We comply with applicable federal civil rights laws and [place] laws. We do not discriminate on the basis of race, color, national origin, age, disability, sex, sexual orientation, or gender identity.            Thank you!     Thank you for choosing [facility]PL[facility]  for your care. Our goal is always to provide you with excellent care. Hearing back from our patients is one way we can continue to improve our services. Please take a few minutes to complete  the written survey that you may receive in the mail after your visit with us. Thank you!             Your Updated Medication List - Protect others around you: Learn how to safely use, store and throw away your medicines at www.disposemymeds.org.          This list is accurate as of: 1/18/18  4:11 PM.  Always use your most recent med list.                   Brand Name Dispense Instructions for use Diagnosis    acetaminophen 325 MG tablet    TYLENOL     Take 2 tablets (650 mg) by mouth every 4 hours as needed for mild pain, fever or headaches    Diffuse large B-cell lymphoma of extranodal site (H)       ascorbic acid 500 MG Tabs     30 tablet    Take 1 tablet (500 mg) by mouth daily    Diffuse large B-cell lymphoma, unspecified body region (H)       atenolol 25 MG tablet    TENORMIN    60 tablet    Take 1 tablet (25 mg) by mouth 2 times daily    Atrial tachycardia (H)       calcium polycarbophil 625 MG tablet    FIBERCON     Take 1 tablet by mouth 2 times daily        calcium-vitamin D 600-400 MG-UNIT per tablet    CALTRATE    60 tablet    Take 2 tablets by mouth every morning    Diffuse large B-cell lymphoma, unspecified body region (H)       digoxin 125 MCG tablet    LANOXIN    30 tablet    Take 1 tablet (125 mcg) by mouth daily    Atrial tachycardia (H)       enoxaparin 40 MG/0.4ML injection    LOVENOX    30 Syringe    Inject 0.4 mLs (40 mg) Subcutaneous daily    VTE (venous thromboembolism), Paroxysmal atrial fibrillation (H)       gabapentin 100 MG capsule    NEURONTIN    180 capsule    Take 2 capsules (200 mg) by mouth 3 times daily    Critical illness myopathy       HYDROCORTISONE PO      Take 100 mg by mouth IV        hydroxychloroquine 200 MG tablet    PLAQUENIL    60 tablet    Take 1 tablet (200 mg) by mouth 2 times daily    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 (H)       lidocaine visc 2% 2.5mL/5mL & maalox/mylanta w/ simeth 2.5mL/5mL & diphenhydrAMINE 5mg/5mL Susp  suspension    [facility] Mouthwash [facility]     Swish and swallow 10 mLs in mouth 2 times daily        multivitamin, therapeutic with minerals Tabs tablet     30 each    Take 1 tablet by mouth daily    Diffuse large B-cell lymphoma, unspecified body region (H)       order for DME     1 Device    Equipment being ordered: BP cuff    Hypertension, unspecified type       potassium & sodium phosphates 280-160-250 MG Packet    NEUTRA-PHOS    60 packet    Take 1 packet by mouth 2 times daily    Hypophosphatemia       potassium chloride 20 MEQ Packet    KLOR-CON    15 tablet    Take 10 mEq by mouth daily    Hypokalemia       predniSONE 5 MG tablet    DELTASONE    90 tablet    Take 1 tablet (5 mg) by mouth daily    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 (H)       tolterodine 4 MG 24 hr capsule    DETROL LA    30 capsule    Take 1 capsule (4 mg) by mouth daily    Urinary urgency       traMADol 50 MG tablet    ULTRAM    30 tablet    Take 1 tablet (50 mg) by mouth every 6 hours as needed for moderate pain    Diffuse large B-cell lymphoma of extranodal site (H)

## 2018-01-18 NOTE — PATIENT INSTRUCTIONS
Ok to transition to oral blood thinners and off the lovenox     Cancer rehab referral for PT/OT    Jennifer in 3-4 months with CT scans and labs

## 2018-01-18 NOTE — PROGRESS NOTES
Patient comes to wound clinic for wound assessment per request of Dr. Rodriguez She has an open wound of media left arm just proximal to elbow that she stated was a former peripheral IV site that was done by EMS during cardiac arrest on Memorial day.  Clean gloves are donned and current dressing removed. Yellow scab was over the site when she arrived in clinic with a suture visible that she stated was put in during a procedure in 1969.  Extravasation wound with large area of epidermal loss in may of 2017  This is treatment week:       Objective findings: stable but stagnant wound healing. Referral to Dr Kamara    Location: L upper extremity    Wound aprox 1 cm x 1.2 cm wound bed covered with slough, therefore unable to determine depth    Wound description: indurated skin around wound    Tenderness: no    Odor: none    Drainage is none    Tunneling:  Suspect tunneling at 12 o'clock due to increased erythema in the superior region     Undermining: from 4-7 0'clock   0.5 cm and at 12 0.5 cm     Wound Base: not visible     Slough appearance: much decreased  adherent, dry and yellow 90 %,     Eschar appearance:  none       Periwound Skin: pink and pale fibrous scarring         Subjective finding:     Patient is assessed for discomfort which is: minimal    Today's status of the wound: stable but a new area of erythema and persistent swelling. Pt will see Dr kamara for scar revision in 2 weeks    Treatment: we will change the treatment to using Iodosorb in effort to clean out the wound. Wound is very dry  Pt received the following instructions:  performing wound care at home. Clean the wound daily in the shower and reapply Iodosorb , cover with bandage  Dr. Hudson was available for supervision of care if needed or if questions should arise and regarding plan of care. Shirley Eller RN, CWON

## 2018-01-18 NOTE — MR AVS SNAPSHOT
After Visit Summary   1/18/2018    Amelia Michel    MRN: [id number]           Patient Information     Date Of Birth          1960        Visit Information        Provider Department      1/18/2018 2:30 PM Shirley Eller RN [facility] Wound Ostomy        Today's Diagnoses     Wound of left upper extremity, subsequent encounter    -  1       Follow-ups after your visit        Your next 10 appointments already scheduled     Jan 24, 2018  1:00 PM CST   (Arrive by 12:45 PM)   RETURN ARRHYTHMIA with Juan Eaton MD   [facility] Care ([facility])    [address]  [address]  [facility] [address] [phone]            Feb 13, 2018 12:30 PM CST   (Arrive by 12:15 PM)   New Plastic Surgery with Debi Jones MD   [facility] Plastic and Reconstructive Surgery ([facility])    [address] [place] [address]  [address] [facility] [address] [phone]           Do not wear perfume.            Feb 13, 2018 12:30 PM CST   RETURN WOUND NURSE VISIT with Shirley Eller RN   [facility] Wound Ostomy ([facility])    [address]  [address] [facility] [address] [phone]            Mar 01, 2018  9:00 AM CST   (Arrive by 8:45 AM)   Urodynamics with Archana Green PA-C   [facility]rology and Inst for Prostate and Urologic Cancers ([facility])    [address]  [address] [facility] [address] [phone]            Apr 06, 2018  8:00 AM CDT   (Arrive by 7:45 AM)   Return Visit with Pj Cunha MD   [facility] Rheumatology ([facility])    [address]  [address]  [facility] [address] [phone]              Future tests that were ordered for you today     Open Future Orders        Priority Expected Expires Ordered    CT Chest abdomen pelvis w & w/o contrast Routine 4/23/2018 6/18/2018 1/18/2018    Comprehensive  metabolic panel Routine 4/23/2018 6/18/2018 1/18/2018    CBC with platelets differential Routine 4/23/2018 6/18/2018 1/18/2018    IgG Routine 4/23/2018 6/18/2018 1/18/2018            Who to contact     Please call your clinic at [phone] to:    Ask questions about your health    Make or cancel appointments    Discuss your medicines    Learn about your test results    Speak to your doctor   If you have compliments or concerns about an experience at your clinic, or if you wish to file a complaint, please contact [facility] Patient Relations at [phone] or email us at [email or web address]         Additional Information About Your Visit        [employer] Information     [employer] gives you secure access to your electronic health record. If you see a primary care provider, you can also send messages to your care team and make appointments. If you have questions, please call your primary care clinic.  If you do not have a primary care provider, please call [phone] and they will assist you.      [employer] is an electronic gateway that provides easy, online access to your medical records. With [employer], you can request a clinic appointment, read your test results, renew a prescription or communicate with your care team.     To access your existing account, please contact your [facility] Clinic or call [phone] for assistance.        Care EveryWhere ID     This is your Care EveryWhere ID. This could be used by other organizations to access your [place] medical records  [id number]         Blood Pressure from Last 3 Encounters:   01/18/18 157/86   01/12/18 131/71   01/08/18 124/75    Weight from Last 3 Encounters:   01/18/18 50.7 kg (111 lb 12.8 oz)   01/12/18 52.6 kg (116 lb)   01/08/18 51.2 kg (112 lb 12.8 oz)              Today, you had the following     No orders found for display         Today's Medication Changes          These changes are accurate  as of: 1/18/18  4:35 PM.  If you have any questions, ask your nurse or doctor.               Start taking these medicines.        Dose/Directions    order for DME   Used for:  Hypertension, unspecified type   Started by:  Lenore Rodriguez MD        Equipment being ordered: BP cuff   Quantity:  1 Device   Refills:  1         Stop taking these medicines if you haven't already. Please contact your care team if you have questions.     acyclovir 400 MG tablet   Commonly known as:  ZOVIRAX   Stopped by:  Lenore Rodriguez MD           fluconazole 100 MG tablet   Commonly known as:  DIFLUCAN   Stopped by:  Lenore Rodriguez MD                Where to get your medicines      Some of these will need a paper prescription and others can be bought over the counter.  Ask your nurse if you have questions.     Bring a paper prescription for each of these medications     order for DME                Primary Care Provider Office Phone # Fax #    Comfort Sabillon [phone] [address] [facility] [address]        Equal Access to Services     [facility]: Hadii aad ku hadasho Soomaali, waaxda luqadaha, qaybta kaalmada adeegyada, waxay idiin hayaan jesica hermosillo . So [facility] [phone].    ATENCIÓN: Si habla español, tiene a sarmiento disposición servicios gratuitos de asistencia lingüística. Llame al [phone].    We comply with applicable federal civil rights laws and [place] laws. We do not discriminate on the basis of race, color, national origin, age, disability, sex, sexual orientation, or gender identity.            Thank you!     Thank you for choosing [facility] WOUND OSTOMY  for your care. Our goal is always to provide you with excellent care. Hearing back from our patients is one way we can continue to improve our services. Please take a few minutes to complete the written survey that you may receive in the mail after your visit with us. Thank you!             Your Updated  Medication List - Protect others around you: Learn how to safely use, store and throw away your medicines at www.disposemymeds.org.          This list is accurate as of: 1/18/18  4:35 PM.  Always use your most recent med list.                   Brand Name Dispense Instructions for use Diagnosis    acetaminophen 325 MG tablet    TYLENOL     Take 2 tablets (650 mg) by mouth every 4 hours as needed for mild pain, fever or headaches    Diffuse large B-cell lymphoma of extranodal site (H)       ascorbic acid 500 MG Tabs     30 tablet    Take 1 tablet (500 mg) by mouth daily    Diffuse large B-cell lymphoma, unspecified body region (H)       atenolol 25 MG tablet    TENORMIN    60 tablet    Take 1 tablet (25 mg) by mouth 2 times daily    Atrial tachycardia (H)       calcium polycarbophil 625 MG tablet    FIBERCON     Take 1 tablet by mouth 2 times daily        calcium-vitamin D 600-400 MG-UNIT per tablet    CALTRATE    60 tablet    Take 2 tablets by mouth every morning    Diffuse large B-cell lymphoma, unspecified body region (H)       digoxin 125 MCG tablet    LANOXIN    30 tablet    Take 1 tablet (125 mcg) by mouth daily    Atrial tachycardia (H)       enoxaparin 40 MG/0.4ML injection    LOVENOX    30 Syringe    Inject 0.4 mLs (40 mg) Subcutaneous daily    VTE (venous thromboembolism), Paroxysmal atrial fibrillation (H)       gabapentin 100 MG capsule    NEURONTIN    180 capsule    Take 2 capsules (200 mg) by mouth 3 times daily    Critical illness myopathy       HYDROCORTISONE PO      Take 100 mg by mouth IV        hydroxychloroquine 200 MG tablet    PLAQUENIL    60 tablet    Take 1 tablet (200 mg) by mouth 2 times daily    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 (H)       lidocaine visc 2% 2.5mL/5mL & maalox/mylanta w/ simeth 2.5mL/5mL & diphenhydrAMINE 5mg/5mL Susp suspension    [facility]     Swish and swallow 10 mLs in mouth 2 times daily        multivitamin,  therapeutic with minerals Tabs tablet     30 each    Take 1 tablet by mouth daily    Diffuse large B-cell lymphoma, unspecified body region (H)       order for DME     1 Device    Equipment being ordered: BP cuff    Hypertension, unspecified type       potassium & sodium phosphates 280-160-250 MG Packet    NEUTRA-PHOS    60 packet    Take 1 packet by mouth 2 times daily    Hypophosphatemia       potassium chloride 20 MEQ Packet    KLOR-CON    15 tablet    Take 10 mEq by mouth daily    Hypokalemia       predniSONE 5 MG tablet    DELTASONE    90 tablet    Take 1 tablet (5 mg) by mouth daily    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 (H)       tolterodine 4 MG 24 hr capsule    DETROL LA    30 capsule    Take 1 capsule (4 mg) by mouth daily    Urinary urgency       traMADol 50 MG tablet    ULTRAM    30 tablet    Take 1 tablet (50 mg) by mouth every 6 hours as needed for moderate pain    Diffuse large B-cell lymphoma of extranodal site (H)

## 2018-01-22 ENCOUNTER — PRE VISIT (OUTPATIENT)
Dept: CARDIOLOGY | Facility: CLINIC | Age: 58
End: 2018-01-22

## 2018-01-24 ENCOUNTER — OFFICE VISIT (OUTPATIENT)
Dept: CARDIOLOGY | Facility: CLINIC | Age: 58
End: 2018-01-24
Attending: INTERNAL MEDICINE
Payer: COMMERCIAL

## 2018-01-24 VITALS
OXYGEN SATURATION: 96 % | SYSTOLIC BLOOD PRESSURE: 137 MMHG | HEART RATE: 54 BPM | HEIGHT: 58 IN | DIASTOLIC BLOOD PRESSURE: 75 MMHG | BODY MASS INDEX: 24.68 KG/M2 | WEIGHT: 117.6 LBS

## 2018-01-24 DIAGNOSIS — E87.6 HYPOKALEMIA: ICD-10-CM

## 2018-01-24 DIAGNOSIS — I48.0 PAROXYSMAL ATRIAL FIBRILLATION (H): Primary | ICD-10-CM

## 2018-01-24 DIAGNOSIS — Z86.74 H/O CARDIAC ARREST: ICD-10-CM

## 2018-01-24 PROCEDURE — 99214 OFFICE O/P EST MOD 30 MIN: CPT | Mod: ZP | Performed by: INTERNAL MEDICINE

## 2018-01-24 PROCEDURE — 93005 ELECTROCARDIOGRAM TRACING: CPT | Mod: ZF

## 2018-01-24 PROCEDURE — G0463 HOSPITAL OUTPT CLINIC VISIT: HCPCS | Mod: 25,ZF

## 2018-01-24 PROCEDURE — 93010 ELECTROCARDIOGRAM REPORT: CPT | Mod: ZP | Performed by: INTERNAL MEDICINE

## 2018-01-24 RX ORDER — POTASSIUM CHLORIDE 1.5 G/1.58G
20 POWDER, FOR SOLUTION ORAL DAILY
Qty: 90 PACKET | Refills: 3 | Status: ON HOLD | OUTPATIENT
Start: 2018-01-24 | End: 2018-03-13

## 2018-01-24 ASSESSMENT — PAIN SCALES - GENERAL: PAINLEVEL: NO PAIN (0)

## 2018-01-24 NOTE — NURSING NOTE
New Medication: Patient was educated regarding newly prescribed medication, including discussion of  the indication, administration, side effects, and when to report to MD or RN. Patient demonstrated understanding of this information and agreed to call with further questions or concerns.  Return Appointment: Patient given instructions regarding scheduling next clinic visit. Patient demonstrated understanding of this information and agreed to call with further questions or concerns.  Medication Change: Patient was educated regarding prescribed medication change, including discussion of the indication, administration, side effects, and when to report to MD or RN. Patient demonstrated understanding of this information and agreed to call with further questions or concerns.  Patient stated she understood all health information given and agreed to call with further questions or concerns.

## 2018-01-24 NOTE — LETTER
1/24/2018      RE: Amelia Michel  [address]  [facility] [address]       Dear Colleague,    Thank you for the opportunity to participate in the care of your patient, Amelia Michel, at the [facility]. Please see a copy of my visit note below.    Electrophysiology Clinic Note  HPI:   Ms. Michel is a 56 year old female who has a past medical history significant for VSD s/p repair 1969, RA, HTN, insomnia, atrial tachyarrhythmias, cardiac arrest, and DLBCL.     She was admitted from 5/15/17-5/23/17 with atrial tachyarrhythmias (SVT, AF, AFL) with symptoms of palpitations, fatigue, and nausea that she had intermittently starting 3 weeks prior to admission. An echo showed LVEF 40-45%, mildly reduced RVEF, moderate biatrial enlargement, mild mitral regurgitation, and moderate tricuspid regurgitation. Prior CMRI from 2015 showed normal function and no significant valvular abnormalities. She was started on Eliquis. She underwent a BRE/DCCV on 5/17/17 c/b hypotension. She then had SVT HR 170s and then went into AF/AFL all within 24 hours after DCCV. She was started on Sotalol and chemically cardioverted. However, she had nausea and thought it was from the Sotalol so this was stopped. She reverted back to AF/AFL and a rate control strategy was adopted. She proved difficult to rate control and remained highly symptomatic. Therefore, she was started on amiodarone. She then converted back to sinus. She continued to report fatigued and nausea despite being in sinus. Given difficult to control arrhthymias, CMRI done showed LVEF 55%, mildly dilated RV with focal area of dyskinesis in RVOT, severe bi-atrial enlargement, severe TR, mild/moderate MR, and DE at RV septal insertion site. RFA was discussed but was defered as patient had a UTI at the time with ESBL. CRP was elevated and remained elevated 72 at discharge. She was sent with Macrobid.  She was then  readmitted on 6/19/17-8/4/17 after suffering an out of hospital cardiac arrest. Her  found her gasping for air and unresponsive in bed. He moved her off the bed, started CPR, and activated EMS. Upon EMS arrival, she was in VF. She was externally defibrillated and had ROSC in the field. She was intubated and brought to [facility]. In the U, she was hypotensive and found to be in escape rhythm 43 bpm. She was taken emergently to cath lab where coronary angiogram showed normal coronary arteries. Temporary pacemaker was placed in RA given sinus arrest. She was also placed on therapeutic hypothermia. Her  reported that she had a syncopal episode while sitting in a chair on 5/26/17 that she did not seek care for. She had been having nausea, diarrhea, and intermittent vomiting over the last week and generally had not been feeling well over the last month. She has also had saddle anesthesia with lower extremity numbness that has been evaluated by neuro as an outpatient. She was started on gabapentin but no conclusive reason. She was noted to have shock liver, CELSA requiring HD, elevated CRP. She continued to have intermittent episodes of AF. Her amiodarone was stopped. She had fevers, cytopenias and was ultimately found to have DLBCL. She was started on chemo cycles. 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 During hospitalization she also developed IV extravasation and necrotic wound. She also struggled with significant hypervolemia/anesarca. She ultimately discharged to TCU on a lifevest. She  was then readmitted on 8/8/17-8/12/17 with strep-mitis intermediate resistance bacteremia felt related to PICC line and neutropenic fevers. She was treated and ultimately discharged. She was then admitted from 9/7/17-9/11/17 with RUE DVT. An echo from 9/11/17 showed LVEF 60-65%, mild pulm HTN, and dilated IVC. She followed up with EP in 9/2017 and was making good  strides in her recovery. She still had LUE wound which was still open. She reported that the lifevest was uncomfortable and she had returned it.     She presents today for follow up. She reports feeling well. She has finished her chemo cycles with repeat imaging demonstrating no evidence of active lymphoma. She continues to deal with LUE wound and will be meeting with Plastics for evaluation. She has been started on plaquenil which can potentiate digoxin. No evidence of recurrent arrhythmias. 2 week Zio patch from 9/2017 showed SR with freq PACs an rare PVCs, no sustained arrhythmias.She denies any chest pain/pressures, dizziness, lightheadedness, worsening shortness of breath, PND, orthopnea, palpitations, or syncopal symptoms. Presenting 12 lead ECG shows SB 1 AVB with sinus arrhyhtmia Vent Rate 54 bpm,  ms, QRS 90 ms, QTc 419 ms. Current cardiac medications include: atenolol, digoxin, and Lovenox.       PAST MEDICAL HISTORY:  Past Medical History:   Diagnosis Date     Atrial fibrillation (H)      Cancer (H)     lymphoma     Cardiac arrest (H)      Hypertension      Neuropathy      Rheumatoid arthritis(714.0)        CURRENT MEDICATIONS:  Current Outpatient Prescriptions   Medication Sig Dispense Refill     order for DME Equipment being ordered: BP cuff 1 Device 1     predniSONE (DELTASONE) 5 MG tablet Take 1 tablet (5 mg) by mouth daily 90 tablet 2     hydroxychloroquine (PLAQUENIL) 200 MG tablet Take 1 tablet (200 mg) by mouth 2 times daily 60 tablet 5     atenolol (TENORMIN) 25 MG tablet Take 1 tablet (25 mg) by mouth 2 times daily 60 tablet 1     gabapentin (NEURONTIN) 100 MG capsule Take 2 capsules (200 mg) by mouth 3 times daily 180 capsule 3     potassium chloride (KLOR-CON) 20 MEQ Packet Take 10 mEq by mouth daily 15 tablet 3     digoxin (LANOXIN) 125 MCG tablet Take 1 tablet (125 mcg) by mouth daily 30 tablet 3     enoxaparin (LOVENOX) 40 MG/0.4ML injection Inject 0.4 mLs (40 mg) Subcutaneous daily  30 Syringe 3     tolterodine (DETROL LA) 4 MG 24 hr capsule Take 1 capsule (4 mg) by mouth daily 30 capsule 5     HYDROCORTISONE PO Take 100 mg by mouth IV       acetaminophen (TYLENOL) 325 MG tablet Take 2 tablets (650 mg) by mouth every 4 hours as needed for mild pain, fever or headaches       calcium-vitamin D (CALTRATE) 600-400 MG-UNIT per tablet Take 2 tablets by mouth every morning 60 tablet 0     multivitamin, therapeutic with minerals (THERA-VIT-M) TABS tablet Take 1 tablet by mouth daily 30 each 0     ascorbic acid 500 MG TABS Take 1 tablet (500 mg) by mouth daily 30 tablet 0     potassium & sodium phosphates (NEUTRA-PHOS) 280-160-250 MG Packet Take 1 packet by mouth 2 times daily (Patient not taking: Reported on 1/24/2018) 60 packet 1     magic mouthwash suspension (diphenhydrAMINE, lidocaine, aluminum-magnesium & simethicone) Swish and swallow 10 mLs in mouth 2 times daily       traMADol (ULTRAM) 50 MG tablet Take 1 tablet (50 mg) by mouth every 6 hours as needed for moderate pain (Patient not taking: Reported on 1/24/2018) 30 tablet 0     calcium polycarbophil (FIBERCON) 625 MG tablet Take 1 tablet by mouth 2 times daily         PAST SURGICAL HISTORY:  Past Surgical History:   Procedure Laterality Date     ANESTHESIA CARDIOVERSION N/A 5/17/2017    Procedure: ANESTHESIA CARDIOVERSION;  ANESTHESIA CARDIOVERSION;  Surgeon: GENERIC ANESTHESIA PROVIDER;  Location: [facility] OR     ARTHRODESIS WRIST  2000    Right wrist     BONE MARROW ASPIRATE &BIOPSY  7/12/2017          FOOT SURGERY      4 left and 2 right     RELEASE CARPAL TUNNEL BILATERAL       REPAIR VENTRICULAR SEPTAL DEFECT  1969       ALLERGIES:     Allergies   Allergen Reactions     Blood Transfusion Related (Informational Only) Hives     Hives with platelets. Give benadryl premedication.     Metoprolol Other (See Comments)     Pt and  report that metoprolol does not work for her and also reports feeling unwell with this medication. She has been able  "to tolerate atenolol, which as worked in controlling her HR.      No Clinical Screening - See Comments      Penicillin Allergy Skin Test not performed, see antimicrobial management team progress note 7/5/17.       Penicillins      Tape [Adhesive Tape] Rash       FAMILY HISTORY:  Family History   Problem Relation Age of Onset     Breast Cancer Mother      Hypertension Mother      Alzheimer Disease Mother      Hypertension Father      Blood Disease Father      Lymphoa     Circulatory Father      A Fib     DIABETES Paternal Grandmother        SOCIAL HISTORY:  Social History   Substance Use Topics     Smoking status: Never Smoker     Smokeless tobacco: Never Used     Alcohol use Yes      Comment: Glass of wine two evenings a night       ROS:   A comprehensive 10 point review of systems negative other than as mentioned in HPI.  Exam:  /75 (BP Location: Right arm, Patient Position: Chair, Cuff Size: Adult Regular)  Pulse 54  Ht 1.473 m (4' 10\")  Wt 53.3 kg (117 lb 9.6 oz)  SpO2 96%  BMI 24.58 kg/m2  GENERAL APPEARANCE: alert and no distress  HEENT: alopecia, MMM, PERRLA.   NECK:supple.   RESPIRATORY: lungs clear to auscultation - no rales, rhonchi or wheezes, no use of accessory muscles, no retractions, respirations are unlabored, normal respiratory rate  CARDIOVASCULAR: regular rhythm, normal S1 with physiologic split S2, no S3 or S4 and no murmur, click or rub, precordium quiet with normal PMI.  ABDOMEN: soft, non tender,bowel sounds normal  EXTREMITIES: peripheral pulses normal, trace pedal edema, LUE wound with dressing CDI.  NEURO: alert and oriented to person/place/time, normal speech, gait and affect  SKIN: pale, fragile, no rashes    Labs:  Reviewed.     Testing/Procedures:  9/11/17 ECHOCARDIOGRAM:   Interpretation Summary  Left ventricular function, chamber size, wall motion, and wall thickness are  normal.The EF is 60-65% (traced 60%.) GLS -18%.  The right ventricle is normal size and normal in " function. The RV is mildly  dilated.  Moderate tricuspid insufficiency is present.  Mild pulmonary hypertension is present (esimated PASP 55 mmHg including RA  pressure.)  Dilation of the inferior vena cava is present with abnormal respiratory  variation in diameter (esitimated RAP 15 mmHg.)  This study was compared with the study from 8/31/17: TR appears slightly  decreased from the earlier study.    7/24/17 CMRI:     1. The LV is normal in cavity size and wall thickness. The global systolic function is normal. The LVEF is  64%. There are no regional wall motion abnormalities. No evidence of myocardial iron overload.      2. The RV is normal in cavity size. The global systolic function is normal. The RVEF is 59%.      3. There is moderate dilation of bilateral atria.      4. Tricuspid regurgitation is present, difficult to quantify due to image quality.      5. Late gadolinium enhancement imaging shows RV insertion site hyperenhancement, a non-specific finding  seen in patients with RV volume/pressure overload.      6. There is a moderate right pleural effusion and small left pleural effusion.      CONCLUSIONS: Normal Bi-Ventricular systolic function, LVEF 64% and RVE 59%. There is no evidence of  infiltrative disease. Compared to the MRI from 5/15/2017, there is no significant change.     7/2017 BRE:  Interpretation Summary  There is a small mass (0.7 cm by 0.2 cm) on the ventricular side of the right  coronary cusp. There is a second mass (0.4 cm by 0.2 cm) observed in the LVOT,  attached to the mitro-aortic curtain. There is a third mass on the noncoronary  cusp that measures 0.4 cm by 0.2 cm that could be a healing vegetation. These  findings are compatible with endocarditis if clinical picture supports.  Right ventricular function, chamber size, wall motion, and thickness are  normal.  This study was compared with the study from 7/10/2017.  There is detection of masses on the aortic valve and LVOT.  Assessment  and Plan:   Ms. Michel is a 56 year old female who has a past medical history significant for VSD s/p repair , RA, HTN, insomnia, atrial tachyarrhythmias, cardiac arrest, and newly diagnosed DLBCL.   She initially presented in 2017 with symptoamtic atrial tachyarrhythmias (SVT, AF, AFL). She underwent DCCV with early recurrence and was started on AAT. She was thought to not tolerate Sotalol and was switched to amiodarone. However, she continued to have fatigue, nausea, and general malaise despite being in sinus. She then suffered out of hospital cardiac arrest and was found in VF by EMS and defibrillated in field. She had a prolonged hospital course c/b anasarca, CELSA, shock liver, fevers, cytopenias and was ultimately found to have DLBCL and started on treatment. She presents today for follow up. She reports feeling well. She continues to have LUE wound and will be meeting with Plastics. She has finished her chemo cycles with repeat imaging demonstrating no evidence of active lymphoma. She has been started on plaquenil which can potentiate digoxin. No evidence of recurrent arrhythmias.     Atrial Fibrillation:  We discussed in detail with the patient management/treatment options for Krystyna includin. Stroke Prophylaxis:  CHADSVASC=+HTN, +gender  2, corresponding to a 2.2% annual stroke / systemic emolism event rate. indicating need for long term oral anticoagulation.  She is now able to be anticoagulated per her Oncology team. We will stop her Lovenox and resume her Eliquis 5 mg BID.   2. Rate Control: Continue Atenolol and Digoxin. Stop digoxin given plaquenil    3. Rhythm Control: Cardioversion, Antiarrhythmics and/or ablation are options for rhythm control. She underwent DCCV with early recurrence. She has possible side effects from Sotalol (however, likely was due to underlying illness at the time and not from medication). Was on amiodarone with continued episodes and stopped. She has been under rate  control strategy only currently. No further arrhythmias. No need for rhythm control at this time.     4. Risk Factor Management: maintain tight BP control, and PB evaluation as indicated.       Cardiac arrest of unknown etiology  - We had previously discussed ICD with patient. However, we are leaning towards no ICD at this stage given her ongoing cardiac evaluation after acute events has been normal. Her arrest was likely compounded by multiple factors including liver failure, renal failure, and metabolic derangements which likely cumulatively resulted in her cardiac arrest.     - Unlikely that amiodarone alone would be the sole cause of arrest. Presenting rhythm did show sinus shut down; however, she had viable escape that should not result in VT/VF cardiac arrest.   - She also has a preference not to pursue ICD and understands risk.   - We will have her get an updated CMRI in 4/2018 to evaluate cardiac structure/function.   Follow up in 6 months.     The patient states understanding and is agreeable with plan.   This patient was seen and evaluated with Dr. Eaton. The above note reflects our joint assessment and plan.   ELIZABETH Verde CNP  Pager: [phone]    EP STAFF NOTE  I have seen and examined the patient as part of a shared visit with GERA Verde NP.  I agree with the note above. I reviewed today's vital signs and medications. I have reviewed and discussed with the advanced practice provider their physical examination, assessment, and plan   Briefly, patient with Hx of VT/VF and afib in the setting of lymphoma with acute inflammatory response associated.  My key history/exam findings are: RRR, right arm healing ulceration.   The key management decisions made by me: when we first evaluated the patient in the hospital, our original assessment is that we were not sure whether there was a reversible cause. As we completed her follow-up, she was diagnosed with and treated for lymphoma, she was in a  heightened inflammatory state with high ESR and CRP, amiodarone and BB contributing to her bradycardia at that point, liver failure and renal failure. Her cardiac evaluation was otherwise continuing to be normal. The last time she visited with us, we were leaning towards an ICD even less and she was not in favor of it back then either. Now we are leaning towards no ICD. We will  repeat an MRI to make sure LV still normal after chemotherapy. If still normal, we will not recommend an ICD. We had an extensive conversation with the patient and , she still not in favor of an ICD but so are we at this time. She is currently in remission. She will be taking care of her arm healing ulceration with plastic surgery. We will follow up with her after that around early summer.    Juan Eaton MD [facility]  Cardiology - Electrophysiology

## 2018-01-24 NOTE — PROGRESS NOTES
Electrophysiology Clinic Note  HPI:   Ms. Michel is a 56 year old female who has a past medical history significant for VSD s/p repair 1969, RA, HTN, insomnia, atrial tachyarrhythmias, cardiac arrest, and DLBCL.     She was admitted from 5/15/17-5/23/17 with atrial tachyarrhythmias (SVT, AF, AFL) with symptoms of palpitations, fatigue, and nausea that she had intermittently starting 3 weeks prior to admission. An echo showed LVEF 40-45%, mildly reduced RVEF, moderate biatrial enlargement, mild mitral regurgitation, and moderate tricuspid regurgitation. Prior CMRI from 2015 showed normal function and no significant valvular abnormalities. She was started on Eliquis. She underwent a BRE/DCCV on 5/17/17 c/b hypotension. She then had SVT HR 170s and then went into AF/AFL all within 24 hours after DCCV. She was started on Sotalol and chemically cardioverted. However, she had nausea and thought it was from the Sotalol so this was stopped. She reverted back to AF/AFL and a rate control strategy was adopted. She proved difficult to rate control and remained highly symptomatic. Therefore, she was started on amiodarone. She then converted back to sinus. She continued to report fatigued and nausea despite being in sinus. Given difficult to control arrhthymias, CMRI done showed LVEF 55%, mildly dilated RV with focal area of dyskinesis in RVOT, severe bi-atrial enlargement, severe TR, mild/moderate MR, and DE at RV septal insertion site. RFA was discussed but was defered as patient had a UTI at the time with ESBL. CRP was elevated and remained elevated 72 at discharge. She was sent with Macrobid.  She was then readmitted on 6/19/17-8/4/17 after suffering an out of hospital cardiac arrest. Her  found her gasping for air and unresponsive in bed. He moved her off the bed, started CPR, and activated EMS. Upon EMS arrival, she was in VF. She was externally defibrillated and had ROSC in the field. She was intubated and brought  to [facility]. In the [facility], she was hypotensive and found to be in escape rhythm 43 bpm. She was taken emergently to cath lab where coronary angiogram showed normal coronary arteries. Temporary pacemaker was placed in RA given sinus arrest. She was also placed on therapeutic hypothermia. Her  reported that she had a syncopal episode while sitting in a chair on 5/26/17 that she did not seek care for. She had been having nausea, diarrhea, and intermittent vomiting over the last week and generally had not been feeling well over the last month. She has also had saddle anesthesia with lower extremity numbness that has been evaluated by neuro as an outpatient. She was started on gabapentin but no conclusive reason. She was noted to have shock liver, CELSA requiring HD, elevated CRP. She continued to have intermittent episodes of AF. Her amiodarone was stopped. She had fevers, cytopenias and was ultimately found to have DLBCL. She was started on chemo cycles. 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 During hospitalization she also developed IV extravasation and necrotic wound. She also struggled with significant hypervolemia/anesarca. She ultimately discharged to TCU on a lifevest. She  was then readmitted on 8/8/17-8/12/17 with strep-mitis intermediate resistance bacteremia felt related to PICC line and neutropenic fevers. She was treated and ultimately discharged. She was then admitted from 9/7/17-9/11/17 with RUE DVT. An echo from 9/11/17 showed LVEF 60-65%, mild pulm HTN, and dilated IVC. She followed up with EP in 9/2017 and was making good strides in her recovery. She still had LUE wound which was still open. She reported that the lifevest was uncomfortable and she had returned it.     She presents today for follow up. She reports feeling well. She has finished her chemo cycles with repeat imaging demonstrating no evidence of active lymphoma. She continues  to deal with LUE wound and will be meeting with Plastics for evaluation. She has been started on plaquenil which can potentiate digoxin. No evidence of recurrent arrhythmias. 2 week Zio patch from 9/2017 showed SR with freq PACs an rare PVCs, no sustained arrhythmias.She denies any chest pain/pressures, dizziness, lightheadedness, worsening shortness of breath, PND, orthopnea, palpitations, or syncopal symptoms. Presenting 12 lead ECG shows SB 1 AVB with sinus arrhyhtmia Vent Rate 54 bpm,  ms, QRS 90 ms, QTc 419 ms. Current cardiac medications include: atenolol, digoxin, and Lovenox.       PAST MEDICAL HISTORY:  Past Medical History:   Diagnosis Date     Atrial fibrillation (H)      Cancer (H)     lymphoma     Cardiac arrest (H)      Hypertension      Neuropathy      Rheumatoid arthritis(714.0)        CURRENT MEDICATIONS:  Current Outpatient Prescriptions   Medication Sig Dispense Refill     order for DME Equipment being ordered: BP cuff 1 Device 1     predniSONE (DELTASONE) 5 MG tablet Take 1 tablet (5 mg) by mouth daily 90 tablet 2     hydroxychloroquine (PLAQUENIL) 200 MG tablet Take 1 tablet (200 mg) by mouth 2 times daily 60 tablet 5     atenolol (TENORMIN) 25 MG tablet Take 1 tablet (25 mg) by mouth 2 times daily 60 tablet 1     gabapentin (NEURONTIN) 100 MG capsule Take 2 capsules (200 mg) by mouth 3 times daily 180 capsule 3     potassium chloride (KLOR-CON) 20 MEQ Packet Take 10 mEq by mouth daily 15 tablet 3     digoxin (LANOXIN) 125 MCG tablet Take 1 tablet (125 mcg) by mouth daily 30 tablet 3     enoxaparin (LOVENOX) 40 MG/0.4ML injection Inject 0.4 mLs (40 mg) Subcutaneous daily 30 Syringe 3     tolterodine (DETROL LA) 4 MG 24 hr capsule Take 1 capsule (4 mg) by mouth daily 30 capsule 5     HYDROCORTISONE PO Take 100 mg by mouth IV       acetaminophen (TYLENOL) 325 MG tablet Take 2 tablets (650 mg) by mouth every 4 hours as needed for mild pain, fever or headaches       calcium-vitamin D  (CALTRATE) 600-400 MG-UNIT per tablet Take 2 tablets by mouth every morning 60 tablet 0     multivitamin, therapeutic with minerals (THERA-VIT-M) TABS tablet Take 1 tablet by mouth daily 30 each 0     ascorbic acid 500 MG TABS Take 1 tablet (500 mg) by mouth daily 30 tablet 0     potassium & sodium phosphates (NEUTRA-PHOS) 280-160-250 MG Packet Take 1 packet by mouth 2 times daily (Patient not taking: Reported on 1/24/2018) 60 packet 1     magic mouthwash suspension (diphenhydrAMINE, lidocaine, aluminum-magnesium & simethicone) Swish and swallow 10 mLs in mouth 2 times daily       traMADol (ULTRAM) 50 MG tablet Take 1 tablet (50 mg) by mouth every 6 hours as needed for moderate pain (Patient not taking: Reported on 1/24/2018) 30 tablet 0     calcium polycarbophil (FIBERCON) 625 MG tablet Take 1 tablet by mouth 2 times daily         PAST SURGICAL HISTORY:  Past Surgical History:   Procedure Laterality Date     ANESTHESIA CARDIOVERSION N/A 5/17/2017    Procedure: ANESTHESIA CARDIOVERSION;  ANESTHESIA CARDIOVERSION;  Surgeon: GENERIC ANESTHESIA PROVIDER;  Location: [facility] OR     ARTHRODESIS WRIST  2000    Right wrist     BONE MARROW ASPIRATE &BIOPSY  7/12/2017          FOOT SURGERY      4 left and 2 right     RELEASE CARPAL TUNNEL BILATERAL       REPAIR VENTRICULAR SEPTAL DEFECT  1969       ALLERGIES:     Allergies   Allergen Reactions     Blood Transfusion Related (Informational Only) Hives     Hives with platelets. Give benadryl premedication.     Metoprolol Other (See Comments)     Pt and  report that metoprolol does not work for her and also reports feeling unwell with this medication. She has been able to tolerate atenolol, which as worked in controlling her HR.      No Clinical Screening - See Comments      Penicillin Allergy Skin Test not performed, see antimicrobial management team progress note 7/5/17.       Penicillins      Tape [Adhesive Tape] Rash       FAMILY HISTORY:  Family History   Problem Relation  "Age of Onset     Breast Cancer Mother      Hypertension Mother      Alzheimer Disease Mother      Hypertension Father      Blood Disease Father      Lymphoa     Circulatory Father      A Fib     DIABETES Paternal Grandmother        SOCIAL HISTORY:  Social History   Substance Use Topics     Smoking status: Never Smoker     Smokeless tobacco: Never Used     Alcohol use Yes      Comment: Glass of wine two evenings a night       ROS:   A comprehensive 10 point review of systems negative other than as mentioned in HPI.  Exam:  /75 (BP Location: Right arm, Patient Position: Chair, Cuff Size: Adult Regular)  Pulse 54  Ht 1.473 m (4' 10\")  Wt 53.3 kg (117 lb 9.6 oz)  SpO2 96%  BMI 24.58 kg/m2  GENERAL APPEARANCE: alert and no distress  HEENT: alopecia, MMM, PERRLA.   NECK:supple.   RESPIRATORY: lungs clear to auscultation - no rales, rhonchi or wheezes, no use of accessory muscles, no retractions, respirations are unlabored, normal respiratory rate  CARDIOVASCULAR: regular rhythm, normal S1 with physiologic split S2, no S3 or S4 and no murmur, click or rub, precordium quiet with normal PMI.  ABDOMEN: soft, non tender,bowel sounds normal  EXTREMITIES: peripheral pulses normal, trace pedal edema, LUE wound with dressing CDI.  NEURO: alert and oriented to person/place/time, normal speech, gait and affect  SKIN: pale, fragile, no rashes    Labs:  Reviewed.     Testing/Procedures:  9/11/17 ECHOCARDIOGRAM:   Interpretation Summary  Left ventricular function, chamber size, wall motion, and wall thickness are  normal.The EF is 60-65% (traced 60%.) GLS -18%.  The right ventricle is normal size and normal in function. The RV is mildly  dilated.  Moderate tricuspid insufficiency is present.  Mild pulmonary hypertension is present (esimated PASP 55 mmHg including RA  pressure.)  Dilation of the inferior vena cava is present with abnormal respiratory  variation in diameter (esitimated RAP 15 mmHg.)  This study was compared " with the study from 8/31/17: TR appears slightly  decreased from the earlier study.    7/24/17 CMRI:     1. The LV is normal in cavity size and wall thickness. The global systolic function is normal. The LVEF is  64%. There are no regional wall motion abnormalities. No evidence of myocardial iron overload.      2. The RV is normal in cavity size. The global systolic function is normal. The RVEF is 59%.      3. There is moderate dilation of bilateral atria.      4. Tricuspid regurgitation is present, difficult to quantify due to image quality.      5. Late gadolinium enhancement imaging shows RV insertion site hyperenhancement, a non-specific finding  seen in patients with RV volume/pressure overload.      6. There is a moderate right pleural effusion and small left pleural effusion.      CONCLUSIONS: Normal Bi-Ventricular systolic function, LVEF 64% and RVE 59%. There is no evidence of  infiltrative disease. Compared to the MRI from 5/15/2017, there is no significant change.     7/2017 BRE:  Interpretation Summary  There is a small mass (0.7 cm by 0.2 cm) on the ventricular side of the right  coronary cusp. There is a second mass (0.4 cm by 0.2 cm) observed in the LVOT,  attached to the mitro-aortic curtain. There is a third mass on the noncoronary  cusp that measures 0.4 cm by 0.2 cm that could be a healing vegetation. These  findings are compatible with endocarditis if clinical picture supports.  Right ventricular function, chamber size, wall motion, and thickness are  normal.  This study was compared with the study from 7/10/2017.  There is detection of masses on the aortic valve and LVOT.  Assessment and Plan:   Ms. Michel is a 56 year old female who has a past medical history significant for VSD s/p repair 1969, RA, HTN, insomnia, atrial tachyarrhythmias, cardiac arrest, and newly diagnosed DLBCL.   She initially presented in 5/2017 with symptoamtic atrial tachyarrhythmias (SVT, AF, AFL). She underwent DCCV with  early recurrence and was started on AAT. She was thought to not tolerate Sotalol and was switched to amiodarone. However, she continued to have fatigue, nausea, and general malaise despite being in sinus. She then suffered out of hospital cardiac arrest and was found in VF by EMS and defibrillated in field. She had a prolonged hospital course c/b anasarca, CELSA, shock liver, fevers, cytopenias and was ultimately found to have DLBCL and started on treatment. She presents today for follow up. She reports feeling well. She continues to have LUE wound and will be meeting with Plastics. She has finished her chemo cycles with repeat imaging demonstrating no evidence of active lymphoma. She has been started on plaquenil which can potentiate digoxin. No evidence of recurrent arrhythmias.     Atrial Fibrillation:  We discussed in detail with the patient management/treatment options for Krystyna includin. Stroke Prophylaxis:  CHADSVASC=+HTN, +gender  2, corresponding to a 2.2% annual stroke / systemic emolism event rate. indicating need for long term oral anticoagulation.  She is now able to be anticoagulated per her Oncology team. We will stop her Lovenox and resume her Eliquis 5 mg BID.   2. Rate Control: Continue Atenolol and Digoxin. Stop digoxin given plaquenil    3. Rhythm Control: Cardioversion, Antiarrhythmics and/or ablation are options for rhythm control. She underwent DCCV with early recurrence. She has possible side effects from Sotalol (however, likely was due to underlying illness at the time and not from medication). Was on amiodarone with continued episodes and stopped. She has been under rate control strategy only currently. No further arrhythmias. No need for rhythm control at this time.     4. Risk Factor Management: maintain tight BP control, and PB evaluation as indicated.       Cardiac arrest of unknown etiology  - We had previously discussed ICD with patient. However, we are leaning towards no ICD at  this stage given her ongoing cardiac evaluation after acute events has been normal. Her arrest was likely compounded by multiple factors including liver failure, renal failure, and metabolic derangements which likely cumulatively resulted in her cardiac arrest.     - Unlikely that amiodarone alone would be the sole cause of arrest. Presenting rhythm did show sinus shut down; however, she had viable escape that should not result in VT/VF cardiac arrest.   - She also has a preference not to pursue ICD and understands risk.   - We will have her get an updated CMRI in 4/2018 to evaluate cardiac structure/function.   Follow up in 6 months.     The patient states understanding and is agreeable with plan.   This patient was seen and evaluated with Dr. Eaton. The above note reflects our joint assessment and plan.   ELIZABETH Verde CNP  Pager: [phone]    EP STAFF NOTE  I have seen and examined the patient as part of a shared visit with GERA Verde NP.  I agree with the note above. I reviewed today's vital signs and medications. I have reviewed and discussed with the advanced practice provider their physical examination, assessment, and plan   Briefly, patient with Hx of VT/VF and afib in the setting of lymphoma with acute inflammatory response associated.  My key history/exam findings are: RRR, right arm healing ulceration.   The key management decisions made by me: when we first evaluated the patient in the hospital, our original assessment is that we were not sure whether there was a reversible cause. As we completed her follow-up, she was diagnosed with and treated for lymphoma, she was in a heightened inflammatory state with high ESR and CRP, amiodarone and BB contributing to her bradycardia at that point, liver failure and renal failure. Her cardiac evaluation was otherwise continuing to be normal. The last time she visited with us, we were leaning towards an ICD even less and she was not in favor of it back  then either. Now we are leaning towards no ICD. We will  repeat an MRI to make sure LV still normal after chemotherapy. If still normal, we will not recommend an ICD. We had an extensive conversation with the patient and , she still not in favor of an ICD but so are we at this time. She is currently in remission. She will be taking care of her arm healing ulceration with plastic surgery. We will follow up with her after that around early summer.    Juan Eaton MD [facility]  Cardiology - Electrophysiology

## 2018-01-24 NOTE — MR AVS SNAPSHOT
After Visit Summary   1/24/2018    Amelia Michel    MRN: [id number]           Patient Information     Date Of Birth          1960        Visit Information        Provider Department      1/24/2018 1:00 PM Juan Eaton MD [facility]        Today's Diagnoses     Atrial fibrillation (H)    -  1    Hypokalemia        H/O cardiac arrest          Care Instructions    Cardiology Provider you saw in clinic today: Dr. Eaton    Medication Changes:     1. Stop digoxin   2. Stop lovenox   3. Start Eliquis 5mg twice a day    Labs/Tests needed:     1. Cardiac MRI in April - will be scheduled once cMRI reader schedule is out.     Follow-up Visit:  6 months        Please contact us via [employer] for questions or concerns.    Stacie Delgado RN   Electrophysiology Nurse Coordinator.  [phone]    If your question concerns the schedule/appointment times, contact:  [facility]   [phone]    Device Clinic (Pacemakers, ICD, Loop Recorders)   [phone]      You will receive all normal lab and testing results via [employer] or mail if not reviewed in clinic today.   If you need a medication refill please contact your pharmacy.    As always, thank you for trusting us with your health care needs!            Follow-ups after your visit        Additional Services     Follow-Up with Electrophysiologist       Angelito                  Your next 10 appointments already scheduled     Feb 01, 2018 12:45 PM CST   Cancer Rehab Eval with Ana Loera, PT   [facility] Lymphedema ([facility], [facility])    [address] [facility]  1st Floor  [address]  [facility] [id number] [phone]            Feb 13, 2018 12:30 PM CST   (Arrive by 12:15 PM)   New Plastic Surgery with Debi Jones MD   [facility] Plastic and Reconstructive Surgery ([facility])    [address]  4th Floor  [facility] [address]    [phone]           Do not wear perfume.            Feb 13, 2018 12:30 PM CST   RETURN WOUND NURSE VISIT with Shirley Eller RN   [facility] Wound Ostomy ([facility])    [address]  4th [facility] [address] [phone]            Mar 01, 2018  9:00 AM CST   (Arrive by 8:45 AM)   Urodynamics with Archana Green PA-C   [facility] Urology and Inst for Prostate and Urologic Cancers ([facility])    [address] [place] [address]  4th [facility] [id number] [phone]            Apr 06, 2018  8:00 AM CDT   (Arrive by 7:45 AM)   Return Visit with Pj Cunha MD   [facility] Rheumatology ([facility])    [address] [place] [address]  [address]  [facility] [id number] [phone]            Jul 25, 2018 10:30 AM CDT   (Arrive by 10:15 AM)   RETURN ARRHYTHMIA with Juan Eaton MD   [facility] Heart Care ([facility])    [address] [place] [address]  [address]  [facility] [id number] [phone]              Future tests that were ordered for you today     Open Future Orders        Priority Expected Expires Ordered    MRI Cardiac w/contrast Routine 4/25/2018 1/25/2019 1/24/2018    Follow-Up with Electrophysiologist Routine 7/24/2018 1/24/2019 1/24/2018            Who to contact     If you have questions or need follow up information about today's clinic visit or your schedule please contact [facility] directly at [phone].  Normal or non-critical lab and imaging results will be communicated to you by MyChart, letter or phone within 4 business days after the clinic has received the results. If you do not hear from us within 7 days, please contact the clinic through MyChart or phone. If you have a critical or abnormal lab result, we will notify you by phone as soon as possible.  Submit refill requests through [employer] or call your pharmacy and they will forward the refill request to us.  "Please allow 3 business days for your refill to be completed.          Additional Information About Your Visit        [employer] Information     [employer] gives you secure access to your electronic health record. If you see a primary care provider, you can also send messages to your care team and make appointments. If you have questions, please call your primary care clinic.  If you do not have a primary care provider, please call [phone] and they will assist you.        Care EveryWhere ID     This is your Care EveryWhere ID. This could be used by other organizations to access your [facility] medical records  [id number]        Your Vitals Were     Pulse Height Pulse Oximetry BMI (Body Mass Index)          54 1.473 m (4' 10\") 96% 24.58 kg/m2         Blood Pressure from Last 3 Encounters:   01/24/18 137/75   01/18/18 157/86   01/12/18 131/71    Weight from Last 3 Encounters:   01/24/18 53.3 kg (117 lb 9.6 oz)   01/18/18 50.7 kg (111 lb 12.8 oz)   01/12/18 52.6 kg (116 lb)              We Performed the Following     EKG 12-lead, tracing only (Same Day)          Today's Medication Changes          These changes are accurate as of 1/24/18  3:16 PM.  If you have any questions, ask your nurse or doctor.               Start taking these medicines.        Dose/Directions    apixaban ANTICOAGULANT 5 MG tablet   Commonly known as:  ELIQUIS   Used for:  Atrial fibrillation (H)        Dose:  5 mg   Take 1 tablet (5 mg) by mouth 2 times daily   Quantity:  180 tablet   Refills:  3         These medicines have changed or have updated prescriptions.        Dose/Directions    potassium chloride 20 MEQ Packet   Commonly known as:  KLOR-CON   This may have changed:  how much to take   Used for:  Hypokalemia        Dose:  20 mEq   Take 20 mEq by mouth daily   Quantity:  90 packet   Refills:  3         Stop taking these medicines if you haven't already. Please contact your care team if you have questions.     digoxin 125 MCG tablet "   Commonly known as:  LANOXIN           enoxaparin 40 MG/0.4ML injection   Commonly known as:  LOVENOX                Where to get your medicines      These medications were sent to [place], [place] - [address]  [address], [facility] [address]    Hours:  TRANSPLANT PHONE NUMBER [phone] Phone:  [phone]     apixaban ANTICOAGULANT 5 MG tablet    potassium chloride 20 MEQ Packet                Primary Care Provider Office Phone # Fax #    Comfort Sabillon [phone] [address] [facility] [address]        Equal Access to Services     [facility]: Hadii aad shayla hadasho Sogilbert, waaxda luqadaha, qaybta kaalmada adeegyada, durga inman. So [facility] [phone].    ATENCIÓN: Si habla español, tiene a sarmiento disposición servicios gratuitos de asistencia lingüística. [facility] [phone].    We comply with applicable federal civil rights laws and [place] laws. We do not discriminate on the basis of race, color, national origin, age, disability, sex, sexual orientation, or gender identity.            Thank you!     Thank you for choosing [facility]  for your care. Our goal is always to provide you with excellent care. Hearing back from our patients is one way we can continue to improve our services. Please take a few minutes to complete the written survey that you may receive in the mail after your visit with us. Thank you!             Your Updated Medication List - Protect others around you: Learn how to safely use, store and throw away your medicines at www.disposemymeds.org.          This list is accurate as of 1/24/18  3:16 PM.  Always use your most recent med list.                   Brand Name Dispense Instructions for use Diagnosis    acetaminophen 325 MG tablet    TYLENOL     Take 2 tablets (650 mg) by mouth every 4 hours as needed for mild pain, fever or headaches    Diffuse  large B-cell lymphoma of extranodal site (H)       apixaban ANTICOAGULANT 5 MG tablet    ELIQUIS    180 tablet    Take 1 tablet (5 mg) by mouth 2 times daily    Atrial fibrillation (H)       ascorbic acid 500 MG Tabs     30 tablet    Take 1 tablet (500 mg) by mouth daily    Diffuse large B-cell lymphoma, unspecified body region (H)       atenolol 25 MG tablet    TENORMIN    60 tablet    Take 1 tablet (25 mg) by mouth 2 times daily    Atrial tachycardia (H)       calcium polycarbophil 625 MG tablet    FIBERCON     Take 1 tablet by mouth 2 times daily        calcium-vitamin D 600-400 MG-UNIT per tablet    CALTRATE    60 tablet    Take 2 tablets by mouth every morning    Diffuse large B-cell lymphoma, unspecified body region (H)       gabapentin 100 MG capsule    NEURONTIN    180 capsule    Take 2 capsules (200 mg) by mouth 3 times daily    Critical illness myopathy       HYDROCORTISONE PO      Take 100 mg by mouth IV        hydroxychloroquine 200 MG tablet    PLAQUENIL    60 tablet    Take 1 tablet (200 mg) by mouth 2 times daily    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 (H)       lidocaine visc 2% 2.5mL/5mL & maalox/mylanta w/ simeth 2.5mL/5mL & diphenhydrAMINE 5mg/5mL Susp suspension    [facility]     Swish and swallow 10 mLs in mouth 2 times daily        multivitamin, therapeutic with minerals Tabs tablet     30 each    Take 1 tablet by mouth daily    Diffuse large B-cell lymphoma, unspecified body region (H)       order for DME     1 Device    Equipment being ordered: BP cuff    Hypertension, unspecified type       potassium chloride 20 MEQ Packet    KLOR-CON    90 packet    Take 20 mEq by mouth daily    Hypokalemia       predniSONE 5 MG tablet    DELTASONE    90 tablet    Take 1 tablet (5 mg) by mouth daily    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

## 2018-01-24 NOTE — PATIENT INSTRUCTIONS
Cardiology Provider you saw in clinic today: Dr. Eaton    Medication Changes:     1. Stop digoxin   2. Stop lovenox   3. Start Eliquis 5mg twice a day    Labs/Tests needed:     1. Cardiac MRI in April - will be scheduled once cMRI reader schedule is out.     Follow-up Visit:  6 months        Please contact us via Ec[employer] for questions or concerns.    Stacie Delgado RN   Electrophysiology Nurse Coordinator.  [phone]    If your question concerns the schedule/appointment times, contact:  [facility]   [phone]    Device Clinic (Pacemakers, ICD, Loop Recorders)   [phone]      You will receive all normal lab and testing results via Ec[employer] or mail if not reviewed in clinic today.   If you need a medication refill please contact your pharmacy.    As always, thank you for trusting us with your health care needs!

## 2018-01-24 NOTE — NURSING NOTE
Chief Complaint   Patient presents with     Follow Up For     4 mo f/u AF, cardiac arrest unknown etiology. Discuss ICD. EKG     Vitals were taken and medications were reconciled. EKG was performed.  ROSA Zelaya  1:02 PM

## 2018-01-25 LAB — INTERPRETATION ECG - MUSE: NORMAL

## 2018-02-01 ENCOUNTER — PRE VISIT (OUTPATIENT)
Dept: UROLOGY | Facility: CLINIC | Age: 58
End: 2018-02-01

## 2018-02-01 ENCOUNTER — HOSPITAL ENCOUNTER (OUTPATIENT)
Dept: PHYSICAL THERAPY | Facility: CLINIC | Age: 58
Setting detail: THERAPIES SERIES
End: 2018-02-01
Attending: INTERNAL MEDICINE
Payer: COMMERCIAL

## 2018-02-01 PROCEDURE — 97535 SELF CARE MNGMENT TRAINING: CPT | Mod: GP | Performed by: PHYSICAL THERAPIST

## 2018-02-01 PROCEDURE — 97162 PT EVAL MOD COMPLEX 30 MIN: CPT | Mod: GP | Performed by: PHYSICAL THERAPIST

## 2018-02-01 PROCEDURE — 40000360 ZZHC STATISTIC PT CANCER REHAB VISIT: Performed by: PHYSICAL THERAPIST

## 2018-02-01 PROCEDURE — 97110 THERAPEUTIC EXERCISES: CPT | Mod: GP | Performed by: PHYSICAL THERAPIST

## 2018-02-01 ASSESSMENT — 6 MINUTE WALK TEST (6MWT)
TOTAL DISTANCE WALKED (FT): 920
TOTAL DISTANCE WALKED (METERS): 280

## 2018-02-01 NOTE — PROGRESS NOTES
02/01/18 1200   Quick Adds   Type of Visit Initial OP PT Evaluation   General Information   Start of Care Date 02/01/18   Referring Physician Dr. Lenore Rodriguez   Orders Evaluate and Treat as Indicated   Order Date 01/18/18   Medical Diagnosis Lymphoma, neuropathy   Onset of illness/injury or Date of Surgery 05/31/17   Precautions/Limitations no known precautions/limitations   Surgical/Medical history reviewed Yes   Pertinent history of current problem (include personal factors and/or comorbidities that impact the POC) moderate complexity:Energy level very low, unable to work, wakes up 2-3x/night with diffcutly returning to sleep, neuropathy, weakness, severe RA. history of Rheumatoid Arthritis status post long term treatment with methotrexate with sudden cardiac arrest of unclear etiology May 29, 2017, ICU stay , dx of  stage IV diffuse large B cell lymphoma with bone, liver and cardiac lesions. Chemo completed 12/18/17 and CT shows complete remission. Had R ankle sprain 5/2017 and now wearing splint and having difficulty driving. Neuropathy L fingers and R foot and LE. Poor sleep with frequent wakenings and difficulty getting back to sleep. Prior R shoulder fusion, B great toe fusions and foot surgeries.    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 I want more energy. I'm not able to do any cleaning. \"   Fall Risk Screen   Fall screen completed by PT   Have you fallen 2 or more times in the past year? No   Have you fallen and had an injury in the past year? No   Is patient a fall risk? No   System Outcome Measures   Outcome Measures Cancer Rehab   Six Minute Walk (meters). An increase of 70 or more meters indicates statistically significant change. 280   Pain   Patient currently in pain Yes   Pain location R lateral plantar surface and arch of foot   Pain rating 3/10   Pain description comment takes neurontin, \"it feels like it's asleep\"   Additional pain locations? Pain location 2   Pain location 2 R lateral calf   Pain comments feels tight and cramped, like a charley horse, always   Vital Signs   Vital Signs Other Vital Signs   Other Vital Signs 4'10\", 115#; (used to be 150#)   Cognitive Status Examination   Orientation orientation to person, place and time   Level of Consciousness alert   Follows Commands and Answers Questions 100% of the time   Cognitive Comment at times has difficuly remembering things, short and long term, work finding difficulties, increases with stress  and lack of sleep   Integumentary   Integumentary Comments wound L cubital fossa since 8/2017, bandaged. May have surgery soon.   Posture   Posture Protracted shoulders   Range of Motion (ROM)   ROM Comment hold R foot in inverted position but able to manually move to neutral, df to neutral, R shoulder pain at 90* , able to felx to 135*, L shoulder flex 147*   Strength   Strength Comments R ankle: 2/5 pf, df; 1/5 eversion, 3/5 inversion,  avg of 3: R: 29#, L: 26#, Bshoulder flex, abd and elbow flex/ext and knee flex 4/5, B hip abd 3/5, B hip exnt 4-/5,    Gait Special Tests   Gait Special Tests SIX MINUTE WALK TEST   Gait Special Tests Six Minute Walk Test   Feet 920 Feet   Comments HR 60-99bpm, O2 sats 98%    Balance " "  Balance Comments decreased standing balance   Sensory Examination   Sensory Perception Comments able to feel light touch in R foot and lateral leg but feels \"fuzzy\"   Planned Therapy Interventions   Planned Therapy Interventions IADL retraining;balance training;gait training;neuromuscular re-education;joint mobilization;ROM;strengthening;stretching;manual therapy   Clinical Impression   Criteria for Skilled Therapeutic Interventions Met yes, treatment indicated   PT Diagnosis B LE weakness, aerobic deconditioning, R ankle weakness and decreased ROM, R LE and L finger neuropathy   Influenced by the following impairments weakness, decreased sensation, decreased ROM, decreased aerobic conditioing   Functional limitations due to impairments unable to drive, fatigue with walking 5' or more, 6 min walk test norm is 1863 feet and pt completes only 920 feet,  unable to do housework due to fatigue   Clinical Presentation Evolving/Changing   Clinical Presentation Rationale Pt has long history of RA and  presents with weakness, decreased sensation and balance, decreased strength and aerobic conditioning s/p chemo for lymphoma and R ankle sprain and would benefit from above interventions to increase functional mobility   Clinical Decision Making (Complexity) Moderate complexity   Therapy Frequency 2 times/Week   Predicted Duration of Therapy Intervention (days/wks) 6 weeks   Risk & Benefits of therapy have been explained Yes   Patient, Family & other staff in agreement with plan of care Yes   Clinical Impression Comments Pt has long history of RA and presents with weakness, decreased sensation and balance, decreased strength and aerobic conditioning s/p chemo for lymphoma and R ankle sprain and would benefit from above interventions to increase functional mobility   Education Assessment   Preferred Learning Style Listening;Reading;Demonstration;Pictures/video   Barriers to Learning No barriers   GOALS   PT Eval Goals 1;2;3 "   Goal 1   Goal Identifier 6 min walk test   Goal Description increase 6 min walk test by 70 meters to 350 meters to increase ability to do errands without fatigue   Target Date 04/01/18   Goal 2   Goal Identifier driving   Goal Description Pt will have ankle ROM and strength to be able to drive for 30 minutes   Target Date 03/15/18   Goal 3   Goal Identifier home program   Goal Description Pt will be independent in home program of strengthing, ROM and aerobic conditioning and know how to advance it w/o increasing fatigue.   Target Date 04/15/18   Total Evaluation Time   Total Evaluation Time (Minutes) 25

## 2018-02-06 ENCOUNTER — HOSPITAL ENCOUNTER (OUTPATIENT)
Dept: PHYSICAL THERAPY | Facility: CLINIC | Age: 58
Setting detail: THERAPIES SERIES
End: 2018-02-06
Attending: INTERNAL MEDICINE
Payer: COMMERCIAL

## 2018-02-06 PROCEDURE — 97112 NEUROMUSCULAR REEDUCATION: CPT | Mod: GP | Performed by: PHYSICAL THERAPIST

## 2018-02-06 PROCEDURE — 40000360 ZZHC STATISTIC PT CANCER REHAB VISIT: Performed by: PHYSICAL THERAPIST

## 2018-02-06 PROCEDURE — 97110 THERAPEUTIC EXERCISES: CPT | Mod: GP | Performed by: PHYSICAL THERAPIST

## 2018-02-07 ENCOUNTER — CARE COORDINATION (OUTPATIENT)
Dept: WOUND CARE | Facility: CLINIC | Age: 58
End: 2018-02-07

## 2018-02-07 NOTE — PROGRESS NOTES
Home care WOCN wanted to know if it is ok to apply Iodosorb to wound Monday through Friday and mesalt twice daily Saturday and Sunday. Ok to do this.

## 2018-02-08 ENCOUNTER — HOSPITAL ENCOUNTER (OUTPATIENT)
Dept: PHYSICAL THERAPY | Facility: CLINIC | Age: 58
Setting detail: THERAPIES SERIES
End: 2018-02-08
Attending: INTERNAL MEDICINE
Payer: COMMERCIAL

## 2018-02-08 PROCEDURE — 40000360 ZZHC STATISTIC PT CANCER REHAB VISIT: Performed by: PHYSICAL THERAPIST

## 2018-02-08 PROCEDURE — 97110 THERAPEUTIC EXERCISES: CPT | Mod: GP | Performed by: PHYSICAL THERAPIST

## 2018-02-08 PROCEDURE — 97140 MANUAL THERAPY 1/> REGIONS: CPT | Mod: GP | Performed by: PHYSICAL THERAPIST

## 2018-02-08 PROCEDURE — 97112 NEUROMUSCULAR REEDUCATION: CPT | Mod: GP | Performed by: PHYSICAL THERAPIST

## 2018-02-13 ENCOUNTER — OFFICE VISIT (OUTPATIENT)
Dept: PLASTIC SURGERY | Facility: CLINIC | Age: 58
End: 2018-02-13
Payer: COMMERCIAL

## 2018-02-13 ENCOUNTER — OFFICE VISIT (OUTPATIENT)
Dept: WOUND CARE | Facility: CLINIC | Age: 58
End: 2018-02-13
Payer: COMMERCIAL

## 2018-02-13 DIAGNOSIS — S41.102S WOUND OF LEFT UPPER EXTREMITY, SEQUELA: ICD-10-CM

## 2018-02-13 DIAGNOSIS — R22.32 MASS OF LEFT UPPER EXTREMITY: Primary | ICD-10-CM

## 2018-02-13 DIAGNOSIS — S41.102A WOUND OF LEFT UPPER EXTREMITY, INITIAL ENCOUNTER: Primary | ICD-10-CM

## 2018-02-13 DIAGNOSIS — Z53.9 ERRONEOUS ENCOUNTER--DISREGARD: Primary | ICD-10-CM

## 2018-02-13 NOTE — LETTER
2/13/2018       RE: Amelia Michel  [address]  [place] [facility] [address]     Dear Colleague,    Thank you for referring your patient, Amleia Michel, to the [facility] PLASTIC AND RECONSTRUCTIVE SURGERY at [facility]. Please see a copy of my visit note below.    REFERRING PROVIDER: Debi Jones    REASON FOR CONSULTATION: Chronic nonhealing left medial elbow wound.    HPI: Patient is a 57-year-old female with nearly 1 year history of a chronic nonhealing left medial elbow wound.  She was referred to me by Dr. Milly Jones for possible surgical management of this wound.  This wound as a result of an IV extravasation that occurred in May 2018 during an admission for cardiac arrest.  Despite removal of the IV, multiple local debridements, and local dressing changes, this wound has never healed since then.  Patient has been on chemotherapy which completed 2 months ago.  The wound has never been biopsied.    MEDS:   Prior to Admission medications    Medication Sig Start Date End Date Taking? Authorizing Provider   apixaban ANTICOAGULANT (ELIQUIS) 5 MG tablet Take 1 tablet (5 mg) by mouth 2 times daily 1/24/18   Juan Eaton MD   potassium chloride (KLOR-CON) 20 MEQ Packet Take 20 mEq by mouth daily 1/24/18   Juan Eaton MD   order for DME Equipment being ordered: BP cuff 1/18/18   Lenore Rodriguez MD   predniSONE (DELTASONE) 5 MG tablet Take 1 tablet (5 mg) by mouth daily 1/12/18   Pj Cunha MD   hydroxychloroquine (PLAQUENIL) 200 MG tablet Take 1 tablet (200 mg) by mouth 2 times daily 1/12/18   Pj Cunha MD   atenolol (TENORMIN) 25 MG tablet Take 1 tablet (25 mg) by mouth 2 times daily 12/18/17   Kelly Mcginnis PA-C   gabapentin (NEURONTIN) 100 MG capsule Take 2 capsules (200 mg) by mouth 3 times daily 11/29/17   Kelly Mcginnis PA-C   tolterodine (DETROL LA) 4 MG 24 hr capsule Take 1 capsule (4 mg) by mouth daily 10/30/17   Peter  Archana Beltran PA-C   HYDROCORTISONE PO Take 100 mg by mouth IV    Reported, Patient   magic mouthwash suspension (diphenhydrAMINE, lidocaine, aluminum-magnesium & simethicone) Swish and swallow 10 mLs in mouth 2 times daily    Reported, Patient   acetaminophen (TYLENOL) 325 MG tablet Take 2 tablets (650 mg) by mouth every 4 hours as needed for mild pain, fever or headaches 9/11/17   Sara Jo APRN CNP   calcium polycarbophil (FIBERCON) 625 MG tablet Take 1 tablet by mouth 2 times daily    Reported, Patient   calcium-vitamin D (CALTRATE) 600-400 MG-UNIT per tablet Take 2 tablets by mouth every morning 8/18/17   Jamila Cordova MD   multivitamin, therapeutic with minerals (THERA-VIT-M) TABS tablet Take 1 tablet by mouth daily 8/18/17   Jamila Cordova MD   ascorbic acid 500 MG TABS Take 1 tablet (500 mg) by mouth daily 8/18/17   Jamila Cordova MD       ALLERGIES:   Allergies   Allergen Reactions     Blood Transfusion Related (Informational Only) Hives     Hives with platelets. Give benadryl premedication.     Metoprolol Other (See Comments)     Pt and  report that metoprolol does not work for her and also reports feeling unwell with this medication. She has been able to tolerate atenolol, which as worked in controlling her HR.      No Clinical Screening - See Comments      Penicillin Allergy Skin Test not performed, see antimicrobial management team progress note 7/5/17.       Penicillins      Tape [Adhesive Tape] Rash       PMH:   Past Medical History:   Diagnosis Date     Atrial fibrillation (H)      Cancer (H)     lymphoma     Cardiac arrest (H)      Hypertension      Neuropathy      Rheumatoid arthritis(714.0)        PSH:   Past Surgical History:   Procedure Laterality Date     ANESTHESIA CARDIOVERSION N/A 5/17/2017    Procedure: ANESTHESIA CARDIOVERSION;  ANESTHESIA CARDIOVERSION;  Surgeon: GENERIC ANESTHESIA PROVIDER;  Location: [facility] OR     ARTHRODESIS WRIST  2000    Right wrist      BONE MARROW ASPIRATE &BIOPSY  7/12/2017          FOOT SURGERY      4 left and 2 right     RELEASE CARPAL TUNNEL BILATERAL       REPAIR VENTRICULAR SEPTAL DEFECT  1969       SH:   Social History   Substance Use Topics     Smoking status: Never Smoker     Smokeless tobacco: Never Used     Alcohol use Yes      Comment: Glass of wine two evenings a night       FH:   Family History   Problem Relation Age of Onset     Breast Cancer Mother      Hypertension Mother      Alzheimer Disease Mother      Hypertension Father      Blood Disease Father      Lymphoa     Circulatory Father      A Fib     DIABETES Paternal Grandmother        ROS: Denies chest pain, shortness of breath, MI, CVA, diabetes, DVT, PE, and bleeding disorders.    PHYSICAL EXAMINATION:   General: In no acute distress.  On exam of the left upper extremity, there is an open 5 mm wound along the left medial elbow adjacent to the antecubital fossa.  This wound is a centimeter deep down to fibrinous exudate that appears unhealthy.  There is minimal tunneling distally but otherwise no undermining.  There is no purulence or drainage on expression.  The surrounding skin quality is poor with distal scar tissue formation in a linear fashion.  Proximal to the open wound, there is a golf ball size firm mass that may be scar tissue.  None of this is tender to palpation.  There does not seem to be a foreign body on visual inspection.    ASSESSMENT: Chronic nonhealing left medial elbow wound as a result of IV extravasation nearly a year ago.    PLAN: I recommended obtaining MRI imaging to further study the firm mass proximal to the wound.  We will also obtain a punch biopsy at the wound edge to rule out malignant changes.  I will see the patient once these results are back for further examination and to see if any healing has occurred in the interim now that the patient is off chemotherapy.  She is to continue performing daily dressing changes per routine in the meantime.   All questions were answered today.    Total time spent with patient was 30 min of which greater than 50% was in counseling.    Again, thank you for allowing me to participate in the care of your patient.      Sincerely,    Kevin Sheldon MD

## 2018-02-13 NOTE — LETTER
2/13/2018       RE: Amelia Michel  [address]  [facility] [address]     Dear Colleague,    Thank you for referring your patient, Amelia Michel, to the [facility] PLASTIC AND RECONSTRUCTIVE SURGERY at [facility]. Please see a copy of my visit note below.    Erroneous encounter.    Patient saw Dr. Wilian Sheldon and was transferred to his clinic schedule.       Again, thank you for allowing me to participate in the care of your patient.      Sincerely,    Debi Jones MD

## 2018-02-13 NOTE — MR AVS SNAPSHOT
After Visit Summary   2/13/2018    Amelia Michel    MRN: [id number]           Patient Information     Date Of Birth          1960        Visit Information        Provider Department      2/13/2018 12:30 PM Debi Jones MD [facility] Plastic and Reconstructive Surgery        Today's Diagnoses     ERRONEOUS ENCOUNTER--DISREGARD    -  1       Follow-ups after your visit        Your next 10 appointments already scheduled     Mar 29, 2018  4:30 PM CDT   (Arrive by 4:15 PM)   NEW HAND with Kevin Sheldon MD   [facility] Orthopaedic Clinic ([facility] [place])    [address]  4th [address]  [place] [address]   [phone]            Mar 30, 2018 10:00 AM CDT   (Arrive by 9:45 AM)   Return Visit with Pj Cunha MD   [facility] Rheumatology ([facility])    [address]  [address]  [place] [address]   [phone]            Apr 03, 2018 11:30 AM CDT   Cancer Rehab Treatment with Ana Loera, PT   [facility] [place] Lymphedema ([facility])    [address] [place] [address]  [address] [facility]  [address]  [facility] [place] [facility] [address]   [phone]            Apr 06, 2018  9:30 AM CDT   (Arrive by 9:15 AM)   MR MYOCARDIUM W CONTRAST with UUMR4, [facility] CV MR NURSE   [facility] MRI ([facility])    [address] [address]   [phone]           Take your medicines as usual, unless your doctor tells you not to. Bring a list of your current medicines to your exam (including vitamins, minerals and over-the-counter drugs).  You may or may not receive intravenous (IV) contrast for this exam pending the discretion of the Radiologist.  You do not need to do anything special to prepare.  The MRI machine uses a strong magnet. Please wear clothes without metal (snaps, zippers). A sweatsuit works well, or we may give you a  [facility] gown.  Please remove any body piercings and hair extensions before you arrive. You will also remove watches, jewelry, hairpins, wallets, dentures, partial dental plates and hearing aids. You may wear contact lenses, and you may be able to wear your rings. We have a safe place to keep your personal items, but it is safer to leave them at home.  **IMPORTANT** THE INSTRUCTIONS BELOW ARE ONLY FOR THOSE PATIENTS WHO HAVE BEEN PRESCRIBED SEDATION OR GENERAL ANESTHESIA DURING THEIR MRI PROCEDURE:  IF YOUR DOCTOR PRESCRIBED ORAL SEDATION (take medicine to help you relax during your exam):   You must get the medicine from your doctor (oral medication) before you arrive. Bring the medicine to the exam. Do not take it at home. You ll be told when to take it upon arriving for your exam.   Arrive one hour early. Bring someone who can take you home after the test. Your medicine will make you sleepy. After the exam, you may not drive, take a bus or take a taxi by yourself.  IF YOUR DOCTOR PRESCRIBED IV SEDATION:   Arrive one hour early. Bring someone who can take you home after the test. Your medicine will make you sleepy. After the exam, you may not drive, take a bus or take a taxi by yourself.   No eating 6 hours before your exam. You may have clear liquids up until 4 hours before your exam. (Clear liquids include water, clear tea, black coffee and fruit juice without pulp.)  IF YOUR DOCTOR PRESCRIBED ANESTHESIA (be asleep for your exam):   Arrive 1 1/2 hours early. Bring someone who can take you home after the test. You may not drive, take a bus or take a taxi by yourself.   No eating 8 hours before your exam. You may have clear liquids up until 4 hours before your exam. (Clear liquids include water, clear tea, black coffee and fruit juice without pulp.)   You will spend four to five hours in the recovery room.  Please call the Imaging Department at your exam site with any questions.            Apr 10, 2018 12:45 PM  CDT   Cancer Rehab Treatment with Ana Loera PT   [facility] [place] Lymphedema ([facility])    [address] [facility]  1st Floor  [address]  [facility] [address] [phone]            Apr 11, 2018 10:00 AM CDT   (Arrive by 9:45 AM)   RETURN ARRHYTHMIA with Juan Eaton MD   [facility] Heart Care ([facility])    [address]  [address]  [place] [id number] [phone]            May 24, 2018 12:00 PM CDT   (Arrive by 11:45 AM)   CT CHEST ABDOMEN PELVIS W/O & W CONTRAST with UCCT1   [facility] Imaging [place] [facility] ([facility])    [address]  1st Floor  [place] [facility] [address] [phone]           Please bring any scans or X-rays taken at other hospitals, if similar tests were done. Also bring a list of your medicines, including vitamins, minerals and over-the-counter drugs. It is safest to leave personal items at home.  Be sure to tell your doctor:   If you have any allergies.   If there s any chance you are pregnant.   If you are breastfeeding.  You may have contrast for this exam. To prepare:   Do not eat or drink for 2 hours before your exam. If you need to take medicine, you may take it with small sips of water. (We may ask you to take liquid medicine as well.)   The day before your exam, drink extra fluids at least six 8-ounce glasses (unless your doctor tells you to restrict your fluids).   You will be given instructions on how to drink the contrast.  Patients over 70 or patients with diabetes or kidney problems:   If you haven t had a blood test (creatinine test) within the last 30 days, the Cardiologist/Radiologist may require you to get this test prior to your exam.  If you have diabetes:   Continue to take your metformin medication on the day of your exam  Please wear loose clothing, such as a sweat suit or jogging clothes. Avoid snaps, zippers and other metal. We may  ask you to undress and put on a hospital gown.  If you have any questions, please call the Imaging Department where you will have your exam.            May 24, 2018  3:00 PM CDT   [facility] Lab Draw with  MASONIC LAB DRAW   [facility] Masonic Lab Draw ([facility])    [address]  [address]  [facility] [address] [phone]            May 24, 2018  3:30 PM CDT   RETURN ONC with Lenore Rodriguez MD   [facility] Blood and Marrow Transplant ([facility])    [address]  [address]  [facility] [address] [phone]              Who to contact     Please call your clinic at [phone] to:    Ask questions about your health    Make or cancel appointments    Discuss your medicines    Learn about your test results    Speak to your doctor            Additional Information About Your Visit        [employer] Information     [employer] gives you secure access to your electronic health record. If you see a primary care provider, you can also send messages to your care team and make appointments. If you have questions, please call your primary care clinic.  If you do not have a primary care provider, please call [phone] and they will assist you.      [employer] is an electronic gateway that provides easy, online access to your medical records. With [employer], you can request a clinic appointment, read your test results, renew a prescription or communicate with your care team.     To access your existing account, please contact your [facility] or call [phone] for assistance.        Care EveryWhere ID     This is your Care EveryWhere ID. This could be used by other organizations to access your [facility] medical records  [id number]         Blood Pressure from Last 3 Encounters:   03/23/18 125/90   03/23/18 (!) 89/65   03/23/18 100/74    Weight from Last 3 Encounters:   03/23/18 56.1 kg (123 lb 11.2 oz)   03/13/18 53.5 kg  (118 lb)   03/07/18 59 kg (130 lb)              Today, you had the following     No orders found for display       Primary Care Provider Office Phone # Fax #    Comfort Sabillon [phone] [address] [place] [address]        Equal Access to Services     [facility] : Hadii aad ku hadasho Soomaali, waaxda luqadaha, qaybta kaalmada adeegyada, waxay idiin hayfloydn adeja louise ladariusn . So [facility] [phone].    ATENCIÓN: Si habla español, tiene a sarmiento disposición servicios gratuitos de asistencia lingüística. Ll[facility]ettering Health [phone].    We comply with applicable federal civil rights laws and [place] laws. We do not discriminate on the basis of race, color, national origin, age, disability, sex, sexual orientation, or gender identity.            Thank you!     Thank you for choosing [facility] PLASTIC AND RECONSTRUCTIVE SURGERY  for your care. Our goal is always to provide you with excellent care. Hearing back from our patients is one way we can continue to improve our services. Please take a few minutes to complete the written survey that you may receive in the mail after your visit with us. Thank you!             Your Updated Medication List - Protect others around you: Learn how to safely use, store and throw away your medicines at www.disposemymeds.org.          This list is accurate as of 2/13/18 11:59 PM.  Always use your most recent med list.                   Brand Name Dispense Instructions for use Diagnosis    acetaminophen 325 MG tablet    TYLENOL     Take 2 tablets (650 mg) by mouth every 4 hours as needed for mild pain, fever or headaches    Diffuse large B-cell lymphoma of extranodal site (H)       apixaban ANTICOAGULANT 5 MG tablet    ELIQUIS    180 tablet    Take 1 tablet (5 mg) by mouth 2 times daily    Paroxysmal atrial fibrillation (H)       ascorbic acid 500 MG Tabs     30 tablet    Take 1 tablet (500 mg) by mouth daily    Diffuse large B-cell lymphoma, unspecified body  region (H)       calcium polycarbophil 625 MG tablet    FIBERCON     Take 1 tablet by mouth 2 times daily        calcium-vitamin D 600-400 MG-UNIT per tablet    CALTRATE    60 tablet    Take 2 tablets by mouth every morning    Diffuse large B-cell lymphoma, unspecified body region (H)       gabapentin 100 MG capsule    NEURONTIN    180 capsule    Take 2 capsules (200 mg) by mouth 3 times daily    Critical illness myopathy       HYDROCORTISONE PO      Take 100 mg by mouth IV        hydroxychloroquine 200 MG tablet    PLAQUENIL    60 tablet    Take 1 tablet (200 mg) by mouth 2 times daily    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 (H)       multivitamin, therapeutic with minerals Tabs tablet     30 each    Take 1 tablet by mouth daily    Diffuse large B-cell lymphoma, unspecified body region (H)       order for DME     1 Device    Equipment being ordered: BP cuff    Hypertension, unspecified type       potassium chloride 20 MEQ Packet    KLOR-CON    90 packet    Take 20 mEq by mouth daily    Hypokalemia       predniSONE 5 MG tablet    DELTASONE    90 tablet    Take 1 tablet (5 mg) by mouth daily    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 (H)       tolterodine 4 MG 24 hr capsule    DETROL LA    30 capsule    Take 1 capsule (4 mg) by mouth daily    Urinary urgency

## 2018-02-13 NOTE — MR AVS SNAPSHOT
After Visit Summary   2/13/2018    Amelia Michel    MRN: [id number]           Patient Information     Date Of Birth          1960        Visit Information        Provider Department      2/13/2018 12:30 PM Shirley Eller RN [facility] Wound Ostomy        Today's Diagnoses     Wound of left upper extremity, subsequent encounter    -  1       Follow-ups after your visit        Your next 10 appointments already scheduled     Feb 13, 2018  6:15 PM CST   (Arrive by 6:00 PM)   New Plastic Surgery with Kevin Sheldon MD   [facility] Plastic and Reconstructive Surgery ([facility] [place])    [address] [place] [address]  4th Floor  [facility] [address]   [phone]           Do not wear perfume.            Feb 15, 2018 11:30 AM CST   Cancer Rehab Treatment with Ana Loera, PT   [facility] [place] [facility]hed[facility] ([facility])    [address] [facility]  1st Floor  [address]  [facility] [address]   [phone]            Feb 20, 2018 11:45 AM CST   Cancer Rehab Treatment with Ana Loera, PT   [facility] [place] [facility] ([facility])    [address] [facility]  1st Floor  [address]  [facility] [address]   [phone]            Feb 22, 2018 11:15 AM CST   Cancer Rehab Treatment with Ana Loera, PT   [facility] [place] [facility] ([facility])    [address] [facility]  1st Floor  [address]  [facility] [address]   [phone]            Feb 26, 2018 11:30 AM CST   Cancer Rehab Treatment with Maria Esther Lane, PT   [facility] [place] [facility] ([facility])    [address] [facility]  1st Floor  [address]  [facility] [address]   [phone]            Mar 01, 2018  9:00 AM CST   (Arrive by 8:45 AM)   Urodynamics with Archana Green PA-C   [facility] Urology and  Inst for Prostate and Urologic Cancers ([facility])    [address]  4th Floor  [facility] [id number] [phone]            Apr 06, 2018  8:00 AM CDT   (Arrive by 7:45 AM)   Return Visit with Pj Cunha MD   [facility] Rheumatology ([facility])    [address]  [address]  [facility] [id number] [phone]            Jul 25, 2018 10:30 AM CDT   (Arrive by 10:15 AM)   RETURN ARRHYTHMIA with Juan Eaton MD   [facility] Heart Care ([facility])    [address]  [address]  [facility] [id number] [phone]              Who to contact     Please call your clinic at [phone] to:    Ask questions about your health    Make or cancel appointments    Discuss your medicines    Learn about your test results    Speak to your doctor            Additional Information About Your Visit        [employer] Information     [employer] gives you secure access to your electronic health record. If you see a primary care provider, you can also send messages to your care team and make appointments. If you have questions, please call your primary care clinic.  If you do not have a primary care provider, please call [phone] and they will assist you.      [employer] is an electronic gateway that provides easy, online access to your medical records. With [employer], you can request a clinic appointment, read your test results, renew a prescription or communicate with your care team.     To access your existing account, please contact your [facility] or call [phone] for assistance.        Care EveryWhere ID     This is your Care EveryWhere ID. This could be used by other organizations to access your [facility] medical records  [id number]         Blood Pressure from Last 3 Encounters:   01/24/18 137/75   01/18/18 157/86   01/12/18 131/71    Weight from Last 3 Encounters:   01/24/18 53.3 kg (117 lb  9.6 oz)   01/18/18 50.7 kg (111 lb 12.8 oz)   01/12/18 52.6 kg (116 lb)              Today, you had the following     No orders found for display       Primary Care Provider Office Phone # Fax #    Comfort Sabillon [phone] [id number]  [facility] [address]        Equal Access to Services     [facility]: Hadii aad ku hadasho Soomaali, waaxda luqadaha, qaybta kaalmada adeegyada, waxay idiin hayaan adeeg kharash la'aan . So [facility] [phone].    ATENCIÓN: Si habla español, tiene a sarmiento disposición servicios gratuitos de asistencia lingüística. Llame al [phone].    We comply with applicable federal civil rights laws and [place] laws. We do not discriminate on the basis of race, color, national origin, age, disability, sex, sexual orientation, or gender identity.            Thank you!     Thank you for choosing [facility] WOUND OSTOMY  for your care. Our goal is always to provide you with excellent care. Hearing back from our patients is one way we can continue to improve our services. Please take a few minutes to complete the written survey that you may receive in the mail after your visit with us. Thank you!             Your Updated Medication List - Protect others around you: Learn how to safely use, store and throw away your medicines at www.disposemymeds.org.          This list is accurate as of 2/13/18  4:17 PM.  Always use your most recent med list.                   Brand Name Dispense Instructions for use Diagnosis    acetaminophen 325 MG tablet    TYLENOL     Take 2 tablets (650 mg) by mouth every 4 hours as needed for mild pain, fever or headaches    Diffuse large B-cell lymphoma of extranodal site (H)       apixaban ANTICOAGULANT 5 MG tablet    ELIQUIS    180 tablet    Take 1 tablet (5 mg) by mouth 2 times daily    Paroxysmal atrial fibrillation (H)       ascorbic acid 500 MG Tabs     30 tablet    Take 1 tablet (500 mg) by mouth daily    Diffuse large B-cell lymphoma,  unspecified body region (H)       atenolol 25 MG tablet    TENORMIN    60 tablet    Take 1 tablet (25 mg) by mouth 2 times daily    Atrial tachycardia (H)       calcium polycarbophil 625 MG tablet    FIBERCON     Take 1 tablet by mouth 2 times daily        calcium-vitamin D 600-400 MG-UNIT per tablet    CALTRATE    60 tablet    Take 2 tablets by mouth every morning    Diffuse large B-cell lymphoma, unspecified body region (H)       gabapentin 100 MG capsule    NEURONTIN    180 capsule    Take 2 capsules (200 mg) by mouth 3 times daily    Critical illness myopathy       HYDROCORTISONE PO      Take 100 mg by mouth IV        hydroxychloroquine 200 MG tablet    PLAQUENIL    60 tablet    Take 1 tablet (200 mg) by mouth 2 times daily    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 (H)       lidocaine visc 2% 2.5mL/5mL & maalox/mylanta w/ simeth 2.5mL/5mL & diphenhydrAMINE 5mg/5mL Susp suspension    [facility] Mouthwash \[facility] [place] [facility]\""     Swish and swallow 10 mLs in mouth 2 times daily        multivitamin, therapeutic with minerals Tabs tablet     30 each    Take 1 tablet by mouth daily    Diffuse large B-cell lymphoma, unspecified body region (H)       order for DME     1 Device    Equipment being ordered: BP cuff    Hypertension, unspecified type       potassium chloride 20 MEQ Packet    KLOR-CON    90 packet    Take 20 mEq by mouth daily    Hypokalemia       predniSONE 5 MG tablet    DELTASONE    90 tablet    Take 1 tablet (5 mg) by mouth daily    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 (H)       tolterodine 4 MG 24 hr capsule    DETROL LA    30 capsule    Take 1 capsule (4 mg) by mouth daily    Urinary urgency

## 2018-02-13 NOTE — PROGRESS NOTES
Patient comes to wound clinic for wound assessment per request of Dr. Rodriguez She has an open wound of media left arm just proximal to elbow that she stated was a former peripheral IV site that was done by EMS during cardiac arrest on Memorial day.  Clean gloves are donned and current dressing removed. Yellow scab was over the site when she arrived in clinic with a suture visible that she stated was put in during a procedure in 1969.  Extravasation wound with large area of epidermal loss in may of 2017  This is treatment week:       Objective findings: stable but stagnant wound healing. Pt also saw dr Jones today who referred pt to see Dr Sheldon    Location: L upper extremity    Wound aprox 1 cm x 1.2 cm wound bed covered with slough, therefore unable to determine depth    Wound description: indurated skin around wound    Tenderness: no    Odor: none    Drainage is none    Tunneling:  Suspect tunneling at 12 o'clock due to increased erythema in the superior region     Undermining: from 4-7 0'clock   0.5 cm and at 12 0.5 cm     Wound Base: not visible     Slough appearance: much decreased  adherent, dry and yellow 90 %,     Eschar appearance:  none       Periwound Skin: pink and pale fibrous scarring     Subjective finding:     Patient is assessed for discomfort which is: minimal    Today's status of the wound: stable but a new area of erythema and persistent swelling. Dr Jones evaluated pt for the possibility of a scar revision. She referred pt to see Dr Sheldon    Treatment: we will change the treatment to using Adamaris to see if the collagen will help at all.  Wound is very dry  Pt received the following instructions:  performing wound care at home. Cleans wound with [employer] or wound spray. Clean the wound daily in the shower and reapply Adamaris AG, cover with bandage.  Pt will see dr Sheldon this afternoon  Dr. Jones  was available for supervision of care if needed or if questions should arise and regarding  plan of care. Shirley Eller RN, CWON

## 2018-02-14 NOTE — PROGRESS NOTES
REFERRING PROVIDER: Debi Jones    REASON FOR CONSULTATION: Chronic nonhealing left medial elbow wound.    HPI: Patient is a 57-year-old female with nearly 1 year history of a chronic nonhealing left medial elbow wound.  She was referred to me by Dr. Milly Jones for possible surgical management of this wound.  This wound as a result of an IV extravasation that occurred in May 2018 during an admission for cardiac arrest.  Despite removal of the IV, multiple local debridements, and local dressing changes, this wound has never healed since then.  Patient has been on chemotherapy which completed 2 months ago.  The wound has never been biopsied.    MEDS:   Prior to Admission medications    Medication Sig Start Date End Date Taking? Authorizing Provider   apixaban ANTICOAGULANT (ELIQUIS) 5 MG tablet Take 1 tablet (5 mg) by mouth 2 times daily 1/24/18   Juan Eaton MD   potassium chloride (KLOR-CON) 20 MEQ Packet Take 20 mEq by mouth daily 1/24/18   Juan Eaton MD   order for DME Equipment being ordered: BP cuff 1/18/18   Lenore Rodriguez MD   predniSONE (DELTASONE) 5 MG tablet Take 1 tablet (5 mg) by mouth daily 1/12/18   Pj Cunha MD   hydroxychloroquine (PLAQUENIL) 200 MG tablet Take 1 tablet (200 mg) by mouth 2 times daily 1/12/18   Pj Cunha MD   atenolol (TENORMIN) 25 MG tablet Take 1 tablet (25 mg) by mouth 2 times daily 12/18/17   Kelly Mcginnis PA-C   gabapentin (NEURONTIN) 100 MG capsule Take 2 capsules (200 mg) by mouth 3 times daily 11/29/17   Kelly Mcginnis PA-C   tolterodine (DETROL LA) 4 MG 24 hr capsule Take 1 capsule (4 mg) by mouth daily 10/30/17   Archana Green PA-C   HYDROCORTISONE PO Take 100 mg by mouth IV    Reported, Patient   magic mouthwash suspension (diphenhydrAMINE, lidocaine, aluminum-magnesium & simethicone) Swish and swallow 10 mLs in mouth 2 times daily    Reported, Patient   acetaminophen (TYLENOL) 325 MG tablet Take 2 tablets (650 mg)  by mouth every 4 hours as needed for mild pain, fever or headaches 9/11/17   Sara Jo, APRN CNP   calcium polycarbophil (FIBERCON) 625 MG tablet Take 1 tablet by mouth 2 times daily    Reported, Patient   calcium-vitamin D (CALTRATE) 600-400 MG-UNIT per tablet Take 2 tablets by mouth every morning 8/18/17   Jamila Cordova MD   multivitamin, therapeutic with minerals (THERA-VIT-M) TABS tablet Take 1 tablet by mouth daily 8/18/17   Jamila Cordova MD   ascorbic acid 500 MG TABS Take 1 tablet (500 mg) by mouth daily 8/18/17   Jamila Cordova MD       ALLERGIES:   Allergies   Allergen Reactions     Blood Transfusion Related (Informational Only) Hives     Hives with platelets. Give benadryl premedication.     Metoprolol Other (See Comments)     Pt and  report that metoprolol does not work for her and also reports feeling unwell with this medication. She has been able to tolerate atenolol, which as worked in controlling her HR.      No Clinical Screening - See Comments      Penicillin Allergy Skin Test not performed, see antimicrobial management team progress note 7/5/17.       Penicillins      Tape [Adhesive Tape] Rash       PMH:   Past Medical History:   Diagnosis Date     Atrial fibrillation (H)      Cancer (H)     lymphoma     Cardiac arrest (H)      Hypertension      Neuropathy      Rheumatoid arthritis(714.0)        PSH:   Past Surgical History:   Procedure Laterality Date     ANESTHESIA CARDIOVERSION N/A 5/17/2017    Procedure: ANESTHESIA CARDIOVERSION;  ANESTHESIA CARDIOVERSION;  Surgeon: GENERIC ANESTHESIA PROVIDER;  Location: [facility] OR     ARTHRODESIS WRIST  2000    Right wrist     BONE MARROW ASPIRATE &BIOPSY  7/12/2017          FOOT SURGERY      4 left and 2 right     RELEASE CARPAL TUNNEL BILATERAL       REPAIR VENTRICULAR SEPTAL DEFECT  1969       SH:   Social History   Substance Use Topics     Smoking status: Never Smoker     Smokeless tobacco: Never Used     Alcohol use Yes       Comment: Glass of wine two evenings a night       FH:   Family History   Problem Relation Age of Onset     Breast Cancer Mother      Hypertension Mother      Alzheimer Disease Mother      Hypertension Father      Blood Disease Father      Lymphoa     Circulatory Father      A Fib     DIABETES Paternal Grandmother        ROS: Denies chest pain, shortness of breath, MI, CVA, diabetes, DVT, PE, and bleeding disorders.    PHYSICAL EXAMINATION:   General: In no acute distress.  On exam of the left upper extremity, there is an open 5 mm wound along the left medial elbow adjacent to the antecubital fossa.  This wound is a centimeter deep down to fibrinous exudate that appears unhealthy.  There is minimal tunneling distally but otherwise no undermining.  There is no purulence or drainage on expression.  The surrounding skin quality is poor with distal scar tissue formation in a linear fashion.  Proximal to the open wound, there is a golf ball size firm mass that may be scar tissue.  None of this is tender to palpation.  There does not seem to be a foreign body on visual inspection.    ASSESSMENT: Chronic nonhealing left medial elbow wound as a result of IV extravasation nearly a year ago.    PLAN: I recommended obtaining MRI imaging to further study the firm mass proximal to the wound.  We will also obtain a punch biopsy at the wound edge to rule out malignant changes.  I will see the patient once these results are back for further examination and to see if any healing has occurred in the interim now that the patient is off chemotherapy.  She is to continue performing daily dressing changes per routine in the meantime.  All questions were answered today.    Total time spent with patient was 30 min of which greater than 50% was in counseling.

## 2018-02-14 NOTE — TELEPHONE ENCOUNTER
APPT INFO    Date /Time: 3/8/18 3:30PM   Reason for Appt: Left Elbow   Ref Provider/Clinic: self   Are there internal records? Yes/No?  IF YES, list clinic names: Yes -     [facility] - imaging in pacs   Are there outside records? Yes/No? no   Patient Contact (Y/N) & Call Details: No - pt is following up with provider in different clinic   Action: Chart reviewed

## 2018-02-15 ENCOUNTER — HOSPITAL ENCOUNTER (OUTPATIENT)
Dept: PHYSICAL THERAPY | Facility: CLINIC | Age: 58
Setting detail: THERAPIES SERIES
End: 2018-02-15
Attending: INTERNAL MEDICINE
Payer: COMMERCIAL

## 2018-02-15 PROCEDURE — 97110 THERAPEUTIC EXERCISES: CPT | Mod: GP | Performed by: PHYSICAL THERAPIST

## 2018-02-15 PROCEDURE — 97140 MANUAL THERAPY 1/> REGIONS: CPT | Mod: GP | Performed by: PHYSICAL THERAPIST

## 2018-02-15 PROCEDURE — 40000360 ZZHC STATISTIC PT CANCER REHAB VISIT: Performed by: PHYSICAL THERAPIST

## 2018-02-15 PROCEDURE — 97116 GAIT TRAINING THERAPY: CPT | Mod: GP | Performed by: PHYSICAL THERAPIST

## 2018-02-15 PROCEDURE — 97535 SELF CARE MNGMENT TRAINING: CPT | Mod: GP | Performed by: PHYSICAL THERAPIST

## 2018-02-16 ENCOUNTER — HOSPITAL ENCOUNTER (OUTPATIENT)
Dept: MRI IMAGING | Facility: CLINIC | Age: 58
Discharge: HOME | End: 2018-02-16
Admitting: RADIOLOGY
Payer: COMMERCIAL

## 2018-02-16 DIAGNOSIS — R22.32 MASS OF LEFT UPPER EXTREMITY: ICD-10-CM

## 2018-02-16 PROCEDURE — A9585 GADOBUTROL INJECTION: HCPCS | Performed by: RADIOLOGY

## 2018-02-16 PROCEDURE — 25000128 H RX IP 250 OP 636: Performed by: RADIOLOGY

## 2018-02-16 PROCEDURE — 73223 MRI JOINT UPR EXTR W/O&W/DYE: CPT | Mod: LT

## 2018-02-16 RX ORDER — GADOBUTROL 604.72 MG/ML
7.5 INJECTION INTRAVENOUS ONCE
Status: COMPLETED | OUTPATIENT
Start: 2018-02-16 | End: 2018-02-16

## 2018-02-16 RX ADMIN — GADOBUTROL 5 ML: 604.72 INJECTION INTRAVENOUS at 12:16

## 2018-02-19 DIAGNOSIS — I47.19 ATRIAL TACHYCARDIA (H): ICD-10-CM

## 2018-02-20 ENCOUNTER — HOSPITAL ENCOUNTER (OUTPATIENT)
Dept: PHYSICAL THERAPY | Facility: CLINIC | Age: 58
Setting detail: THERAPIES SERIES
End: 2018-02-20
Attending: INTERNAL MEDICINE
Payer: COMMERCIAL

## 2018-02-20 PROCEDURE — 97110 THERAPEUTIC EXERCISES: CPT | Mod: GP | Performed by: PHYSICAL THERAPIST

## 2018-02-20 PROCEDURE — 97116 GAIT TRAINING THERAPY: CPT | Mod: GP | Performed by: PHYSICAL THERAPIST

## 2018-02-20 PROCEDURE — 97140 MANUAL THERAPY 1/> REGIONS: CPT | Mod: GP | Performed by: PHYSICAL THERAPIST

## 2018-02-20 PROCEDURE — 40000360 ZZHC STATISTIC PT CANCER REHAB VISIT: Performed by: PHYSICAL THERAPIST

## 2018-02-21 LAB — COPATH REPORT: NORMAL

## 2018-02-21 RX ORDER — ATENOLOL 25 MG/1
25 TABLET ORAL 2 TIMES DAILY
Qty: 60 TABLET | Refills: 3 | Status: SHIPPED | OUTPATIENT
Start: 2018-02-21 | End: 2018-06-29

## 2018-02-22 ENCOUNTER — HOSPITAL ENCOUNTER (OUTPATIENT)
Dept: PHYSICAL THERAPY | Facility: CLINIC | Age: 58
Setting detail: THERAPIES SERIES
End: 2018-02-22
Attending: INTERNAL MEDICINE
Payer: COMMERCIAL

## 2018-02-22 PROCEDURE — 97116 GAIT TRAINING THERAPY: CPT | Mod: GP | Performed by: PHYSICAL THERAPIST

## 2018-02-22 PROCEDURE — 40000360 ZZHC STATISTIC PT CANCER REHAB VISIT: Performed by: PHYSICAL THERAPIST

## 2018-02-22 PROCEDURE — 40000449 ZZHC STATISTIC PT VISIT, LYMPHEDEMA: Performed by: PHYSICAL THERAPIST

## 2018-02-22 PROCEDURE — 97140 MANUAL THERAPY 1/> REGIONS: CPT | Mod: GP | Performed by: PHYSICAL THERAPIST

## 2018-02-22 PROCEDURE — 97110 THERAPEUTIC EXERCISES: CPT | Mod: GP | Performed by: PHYSICAL THERAPIST

## 2018-02-26 ENCOUNTER — HOSPITAL ENCOUNTER (OUTPATIENT)
Dept: PHYSICAL THERAPY | Facility: CLINIC | Age: 58
Setting detail: THERAPIES SERIES
End: 2018-02-26
Attending: INTERNAL MEDICINE
Payer: COMMERCIAL

## 2018-02-26 PROCEDURE — 97110 THERAPEUTIC EXERCISES: CPT | Mod: GP | Performed by: PHYSICAL THERAPIST

## 2018-02-26 PROCEDURE — 97112 NEUROMUSCULAR REEDUCATION: CPT | Mod: GP | Performed by: PHYSICAL THERAPIST

## 2018-02-26 PROCEDURE — 40000360 ZZHC STATISTIC PT CANCER REHAB VISIT: Performed by: PHYSICAL THERAPIST

## 2018-02-26 PROCEDURE — 97140 MANUAL THERAPY 1/> REGIONS: CPT | Mod: GP | Performed by: PHYSICAL THERAPIST

## 2018-03-01 ENCOUNTER — RADIANT APPOINTMENT (OUTPATIENT)
Dept: RADIOLOGY | Facility: AMBULATORY SURGERY CENTER | Age: 58
End: 2018-03-01
Attending: PHYSICIAN ASSISTANT
Payer: COMMERCIAL

## 2018-03-01 ENCOUNTER — OFFICE VISIT (OUTPATIENT)
Dept: UROLOGY | Facility: CLINIC | Age: 58
End: 2018-03-01
Payer: COMMERCIAL

## 2018-03-01 VITALS
DIASTOLIC BLOOD PRESSURE: 85 MMHG | BODY MASS INDEX: 26.26 KG/M2 | HEART RATE: 99 BPM | HEIGHT: 58 IN | SYSTOLIC BLOOD PRESSURE: 126 MMHG | WEIGHT: 125.1 LBS

## 2018-03-01 DIAGNOSIS — R39.198 URINARY DYSFUNCTION: ICD-10-CM

## 2018-03-01 DIAGNOSIS — N39.41 URGE INCONTINENCE: Primary | ICD-10-CM

## 2018-03-01 DIAGNOSIS — R31.29 MICROHEMATURIA: ICD-10-CM

## 2018-03-01 DIAGNOSIS — R39.15 URINARY URGENCY: ICD-10-CM

## 2018-03-01 LAB
ALBUMIN UR-MCNC: 30 MG/DL
AMORPH CRY #/AREA URNS HPF: ABNORMAL /HPF
APPEARANCE UR: ABNORMAL
APPEARANCE UR: CLEAR
BACTERIA #/AREA URNS HPF: ABNORMAL /HPF
BILIRUB UR QL STRIP: NEGATIVE
BILIRUB UR QL: NORMAL
COLOR UR AUTO: YELLOW
COLOR UR: YELLOW
GLUCOSE UR STRIP-MCNC: NEGATIVE MG/DL
GLUCOSE URINE: NORMAL MG/DL
HGB UR QL STRIP: ABNORMAL
HGB UR QL: NORMAL
KETONES UR QL: NORMAL MG/DL
KETONES UR STRIP-MCNC: NEGATIVE MG/DL
LEUKOCYTE ESTERASE UR QL STRIP: ABNORMAL
LEUKOCYTE ESTERASE URINE: NORMAL
MUCOUS THREADS #/AREA URNS LPF: PRESENT /LPF
NITRATE UR QL: NEGATIVE
NITRITE UR QL STRIP: NORMAL
PH UR STRIP: 5.5 PH (ref 5–7)
PH UR STRIP: 6 PH (ref 5–7)
PROTEIN ALBUMIN URINE: 30 MG/DL
RBC #/AREA URNS AUTO: 3 /HPF (ref 0–2)
SOURCE: ABNORMAL
SOURCE: NORMAL
SP GR UR STRIP: 1 (ref 1–1.03)
SP GR UR STRIP: 1 (ref 1–1.03)
UROBILINOGEN UR QL STRIP: 0.2 EU/DL (ref 0.2–1)
UROBILINOGEN UR STRIP-MCNC: 0 MG/DL (ref 0–2)
WBC #/AREA URNS AUTO: 23 /HPF (ref 0–5)

## 2018-03-01 RX ORDER — MIRABEGRON 50 MG/1
50 TABLET, EXTENDED RELEASE ORAL DAILY
Qty: 30 TABLET | Refills: 5 | Status: ON HOLD | OUTPATIENT
Start: 2018-03-01 | End: 2018-03-07

## 2018-03-01 ASSESSMENT — PAIN SCALES - GENERAL: PAINLEVEL: MODERATE PAIN (4)

## 2018-03-01 NOTE — PATIENT INSTRUCTIONS
UROLOGY CLINIC VISIT PATIENT INSTRUCTIONS    1) Schedule an appointment for a cystoscopy with Dr. Sparks next available.     2) STOP taking Detrol (tolterodine) 4 mg daily.    3) START taking Myrbetriq (mirabegron) 50 mg daily. This was sent to the pharmacy.   -Please check your blood pressure once or twice daily for the first week to ensure your blood pressure is not rising. This is an uncommon side effect of Myrbetriq.   -If your insurance does not cover this medication, give the clinic a call at the number below as there is one other option we could try (called Vesicare).    If you have any issues, questions or concerns in the meantime, do not hesitate to contact us at [phone] or via [employer].     It was a pleasure meeting with you today.  Thank you for allowing me and my team the privilege of caring for you today.  YOU are the reason we are here, and I truly hope we provided you with the excellent service you deserve.  Please let us know if there is anything else we can do for you so that we can be sure you are leaving completely satisfied with your care experience.

## 2018-03-01 NOTE — PROGRESS NOTES
PREPROCEDURE DIAGNOSES:    1. Urinary frequency, urgency  2. Urinary incontinence    POSTPROCEDURE DIAGNOSES:  1. Urge urinary incontinence  2. Small bladder capacity (group home <130 mL)  3. UDS shows:  -Multiple episodes of DO starting at bladder volumes of 50 mL which the patient is unable to suppress. She leaks almost the entire contents of her bladder with Pdet pressures ranging into the mid 30's cm H2O.  -Fair/poor bladder compliance  -Small bladder capacity (group home <130 mL)  -Increased sensations  -No stress incontinence, but DO with incontinence as described above.   -Detrusor activity during voiding: fair/good detrusor contraction to 30.6 cm H2O with final PVR ~50 mL  -Detrusor sphincter dyssenergia: some increased EMG activity during voiding; unclear whether this is secondary to true DSD (no known neurological etiology) or voiding dysfunction.  -Fluoroscopy reveals a moderately trabeculated bladder wall with some cellules and diverticuli. No vesicoureteral reflux was observed.  The bladder neck was closed during filling and open during voiding and times of incontinence.    PROCEDURE:    1. Uroflowmetry.  2. Sterile urethral catheterization for measurement of postvoid residual urine volume.  3. Complex filling cystometrogram with measurement of bladder and rectal pressures.  4. Complex voiding cystometrogram with measurement of bladder and rectal pressures.  5. Electromyography of the pelvic floor during urodynamics.  6. Fluoroscopic imaging of the bladder during urodynamics, at least 3 views.    7. Interpretation of urodynamics and flouroscopic imaging.      INDICATIONS FOR PROCEDURE:  Ms. Amelia Michel is a pleasant 57 year old female with urinary frequency, urgency, and urinary incontinence not responsive to Detrol. Baseline video urodynamic assessment is requested today to better characterize Ms. Amelia Michel's voiding dysfunction.      VOIDING DIARY:  No formal voiding diary completed. Per history:  Voids  every 2 hours during the day, nocturia x 4-5.  Episodes of incontinence: multiple per day - goes through 2 Depends briefs per day/night.  Incontinence associated with: strong urge.    DESCRIPTION OF PROCEDURE:  Risks, benefits, and alternatives to urodynamics were discussed with the patient and she wished to proceed.  Urodynamics are planned to better assess the primary etiology for Ms. Michel's urologic dysfunction.  The patient last took anticholinergic medication 2 hours ago.  After informed consent was obtained, the patient was taken to the procedure room where uroflowmetry was performed. Findings below.     PRE-STUDY UROFLOWMETRY:  Voided volume: 120 mL.  Maximum flow rate: 15.9 mL/sec.  Average flow rate: 7.8 mL/sec.  Character of the curve: bell-shaped.  Postvoid residual by catheter: 75 mL.  Pretest urine dipstick was negative for nitrites, positive for small leukocytes and large blood. She does report intermittent blood with wiping after voiding. Noted to have a small labial fissure today which could be source of blood. She denies any UTI symptoms today.     Next a 7F double-lumen urodynamics catheter was inserted into the bladder under sterile technique via native urethra.  A 7F abdominal manometry catheter was placed in the rectum.  EMG pads were placed on both sides of the anal verge.  The bladder was filled with 200 mL of Cystografin at 15 mL/minute and serial pressures were recorded.  With coughing there was an appropriate rise in vesical and abdominal pressures with no change in detrusor pressure, confirming good study catheter placement.    DURING THE FILLING PHASE:  First sensation: 53 mL.  First Desire: 90 mL.  Strong Desire: 95 mL.  Maximum Capacity: 129 mL.    Uninhibited detrusor contractions: multiple episodes of DO present starting at 50 mL instilled which the patient is unable to suppress. She leaks almost the entire contents of her bladder with Pdet pressures ranging into the mid 30's cm  H2O.  Compliance: fair/poor. PDet=31 cmH20 at capacity. Compliance ratio of 4.  Continence: no stress incontinence but DO with incontinence as described above.  EMG: concordant during filling.    DURING THE VOIDING PHASE:  Maximum detrusor contraction with void: 30.6 cm of H2O pressure.  Voided volume: ~50 mL.  Maximum flow rate: 2.4 mL/sec.  Average flow rate: 1.0 mL/sec.  Postvoid Residual: 50 mL.  Detrusor sphincter dyssynergia: some increased EMG activity during voiding; unclear whether this is secondary to true DSD (no known neurological etiology) or voiding dysfunction.  Character of voiding curve: intermittent.  BOOI: 22.7 (suggesting equivocal for obstruction - see key below)  [obstructed (ZHOU index [BOOI] ? 40); equivocal (no definite   obstruction; BOOI 20-40); and no obstruction (BOOI ? 20)]    FLUOROSCOPIC IMAGING OF THE BLADDER DURING URODYNAMICS:  Fluoroscopy during today's procedure demonstrated a moderately trabeculated bladder wall with some cellules and diverticuli. No vesicoureteral reflux was observed.  The bladder neck was closed during filling and open during voiding and times of incontinence.  After voiding to completion, all catheters were removed and the patient was brought back into the consultation room to further discuss today's study results.      ASSESSMENT/PLAN:  Ms. Amelia Michel is a pleasant 57 year old female with urinary frequency, urgency, and urinary incontinence who demonstrated the following findings today on urodynamic evaluation:    -Multiple episodes of DO starting at bladder volumes of 50 mL which the patient is unable to suppress. She leaks almost the entire contents of her bladder with Pdet pressures ranging into the mid 30's cm H2O.  -Fair/poor compliance  -Small bladder capacity (half-way <130 mL)  -Increased sensations  -No stress incontinence, but DO with incontinence as described above.   -Detrusor activity during voiding: fair/good detrusor contraction to 30.6 cm H2O  with final PVR ~50 mL  -Detrusor sphincter dyssenergia: some increased EMG activity during voiding; unclear whether this is secondary to true DSD (no known neurological etiology) or voiding dysfunction.  -Fluoroscopy reveals a moderately trabeculated bladder wall with some cellules and diverticuli. No vesicoureteral reflux was observed.  The bladder neck was closed during filling and open during voiding and times of incontinence.     We discussed her study results in detail today. She has obvious DO with incontinence as well as a small capacity bladder with impaired compliance and heightened sensations.   -She had no improvement with Detrol LA 4 mg daily. Plan to stop this.  -Will switch to Myrbetriq 50 mg daily. Side effects discussed. Monitor BP's.  -Due to intermittent hematuria and severe urgency, will also schedule for cystoscopy with Dr. Sparks or Dr. Alfaro next available for intravesical examination.  -Patient scheduled for CT C/A/P w/w/o contrast (ordered by [facility]) which will complete her hematuria evaluation.   -Urine sent today for UA/UC, cytology.  -If no improvement with Myrbetriq, could further discuss bladder Botox injections or InterStim with urologist at the time of her cystoscopy appt.    - A single Cipro antibiotic was provided for UTI prophylaxis following completion of today's study per department protocol.  The risk of UTI with VUDS is low at ~2.5-3%.      Thank you for allowing me to participate in the care of Ms. Amelia Michel and please don't hesitate to contact me with any questions or concerns.      Archana Green PA-C  Urology Physician Assistant

## 2018-03-01 NOTE — MR AVS SNAPSHOT
After Visit Summary   3/1/2018    Amelia Michel    MRN: [id number]           Patient Information     Date Of Birth          1960        Visit Information        Provider Department      3/1/2018 9:00 AM Archana Green PA-C [facility] Urology and [facility]        Today's Diagnoses     Urinary urgency    -  1    Urge incontinence          Care Instructions    UROLOGY CLINIC VISIT PATIENT INSTRUCTIONS    1) Schedule an appointment for a cystoscopy with Dr. Sparks next available.     2) STOP taking Detrol (tolterodine) 4 mg daily.    3) START taking Myrbetriq (mirabegron) 50 mg daily. This was sent to the pharmacy.   -Please check your blood pressure once or twice daily for the first week to ensure your blood pressure is not rising. This is an uncommon side effect of Myrbetriq.   -If your insurance does not cover this medication, give the clinic a call at the number below as there is one other option we could try (called Vesicare).    If you have any issues, questions or concerns in the meantime, do not hesitate to contact us at [phone] or via [employer].     It was a pleasure meeting with you today.  Thank you for allowing me and my team the privilege of caring for you today.  YOU are the reason we are here, and I truly hope we provided you with the excellent service you deserve.  Please let us know if there is anything else we can do for you so that we can be sure you are leaving completely satisfied with your care experience.                Follow-ups after your visit        Your next 10 appointments already scheduled     Mar 06, 2018 11:45 AM CST   Cancer Rehab Treatment with Ana Loera PT   [facility] [place] Lymphedema ([facility])    [address] [facility]  1st Floor  [address]  [facility] [address]   [phone]            Mar 08, 2018  3:30 PM CST   (Arrive by 3:15 PM)   NEW HAND with Kevin  MD Monalisa   [facility] ([facility] [place])    [address]  4th Floor  [facility] [address]   [phone]            Mar 13, 2018 11:45 AM CDT   Cancer Rehab Treatment with Ana Loera PT   [facility] [place] [facility]hedema ([facility])    [address] [facility]  1st Floor  [address]  [facility] [address]   [phone]            Mar 30, 2018 10:00 AM CDT   (Arrive by 9:45 AM)   Return Visit with Pj Cunha MD   [facility] Rheumatology ([facility])    [address]  [address]  [facility] [address] [phone]            May 24, 2018 12:00 PM CDT   (Arrive by 11:45 AM)   CT CHEST ABDOMEN PELVIS W/O & W CONTRAST with UCCT1   [facility] [place] [facility] ([facility])    [address]  1st Floor  [facility] [address]   [phone]           Please bring any scans or X-rays taken at other hospitals, if similar tests were done. Also bring a list of your medicines, including vitamins, minerals and over-the-counter drugs. It is safest to leave personal items at home.  Be sure to tell your doctor:   If you have any allergies.   If there s any chance you are pregnant.   If you are breastfeeding.  You may have contrast for this exam. To prepare:   Do not eat or drink for 2 hours before your exam. If you need to take medicine, you may take it with small sips of water. (We may ask you to take liquid medicine as well.)   The day before your exam, drink extra fluids at least six 8-ounce glasses (unless your doctor tells you to restrict your fluids).   You will be given instructions on how to drink the contrast.  Patients over 70 or patients with diabetes or kidney problems:   If you haven t had a blood test (creatinine test) within the last 30 days, the Cardiologist/Radiologist may require you to get this test prior to your exam.  If you have diabetes:    Continue to take your metformin medication on the day of your exam  Please wear loose clothing, such as a sweat suit or jogging clothes. Avoid snaps, zippers and other metal. We may ask you to undress and put on a hospital gown.  If you have any questions, please call the Imaging Department where you will have your exam.            May 24, 2018  3:00 PM CDT   [facility] Draw with  [facility] DRAW   [facility] Lab Draw ([facility])    [address] [place] [address]  [address]  [facility] [address] [phone]            May 24, 2018  3:30 PM CDT   RETURN ONC with Lenore Rodriguez MD   [facility] Blood and Marrow Transplant ([facility])    [address] [place] [address]  [address]  [facility] [address] [phone]            Jun 06, 2018  8:45 AM CDT   (Arrive by 8:30 AM)   Cystoscopy with Vadim Sparks MD   [facility] Urology and Inst for Prostate and Urologic Cancers ([facility])    [address] [place] [address]  4th Floor  [facility] [address] [phone]            Jul 25, 2018 10:30 AM CDT   (Arrive by 10:15 AM)   RETURN ARRHYTHMIA with Juan Eaton MD   [facility] Heart Care ([facility])    [address] [place] [address]  [address]  [facility] [id number] [phone]              Who to contact     Please call your clinic at [phone] to:    Ask questions about your health    Make or cancel appointments    Discuss your medicines    Learn about your test results    Speak to your doctor            Additional Information About Your Visit        [employer] Information     [employer] gives you secure access to your electronic health record. If you see a primary care provider, you can also send messages to your care team and make appointments. If you have questions, please call your primary care clinic.  If you do not have a primary care provider, please call [phone] and they will assist you.      [employer] is an  "electronic gateway that provides easy, online access to your medical records. With [employer], you can request a clinic appointment, read your test results, renew a prescription or communicate with your care team.     To access your existing account, please contact your [facility] or call [phone] for assistance.        Care EveryWhere ID     This is your Care EveryWhere ID. This could be used by other organizations to access your [facility] medical records  [id number]        Your Vitals Were     Pulse Height BMI (Body Mass Index)             99 1.473 m (4' 10\") 26.15 kg/m2          Blood Pressure from Last 3 Encounters:   03/01/18 126/85   01/24/18 137/75   01/18/18 157/86    Weight from Last 3 Encounters:   03/01/18 56.7 kg (125 lb 1.6 oz)   01/24/18 53.3 kg (117 lb 9.6 oz)   01/18/18 50.7 kg (111 lb 12.8 oz)              We Performed the Following     Routine UA with microscopic - No culture     Urinalysis chemical screen POCT     Urine Culture Aerobic Bacterial          Today's Medication Changes          These changes are accurate as of 3/1/18 10:23 AM.  If you have any questions, ask your nurse or doctor.               Start taking these medicines.        Dose/Directions    mirabegron 50 MG 24 hr tablet   Commonly known as:  MYRBETRIQ   Used for:  Urge incontinence   Started by:  Archana Green PA-C        Dose:  50 mg   Take 1 tablet (50 mg) by mouth daily   Quantity:  30 tablet   Refills:  5         Stop taking these medicines if you haven't already. Please contact your care team if you have questions.     tolterodine 4 MG 24 hr capsule   Commonly known as:  DETROL LA   Stopped by:  Archana Green PA-C                Where to get your medicines      These medications were sent to [facility] [place], [place] - [address] [facility] [address] [place] [address] [address], [facility] [address]    Hours:  TRANSPLANT PHONE NUMBER [phone] Phone:  " [phone]     mirabegron 50 MG 24 hr tablet                Primary Care Provider Office Phone # Fax #    Comfort Sabillon [phone] [address] [facility] [place] [address]        Equal Access to Services     CATINA SONIDO : Hadii aad ku hadjuanitao Soomaali, waaxda luqadaha, qaybta kaalmada adeegyada, waxalexandro idiin hayfloydn adeja louise ladariusall . So [facility] [phone].    ATENCIÓN: Si habla español, tiene a sarmiento disposición servicios gratuitos de asistencia lingüística. Llame al [phone].    We comply with applicable federal civil rights laws and [place] laws. We do not discriminate on the basis of race, color, national origin, age, disability, sex, sexual orientation, or gender identity.            Thank you!     Thank you for choosing [facility] UROLOGY AND [facility] PR[facility] UR[facility]IC [facility]ERS  for your care. Our goal is always to provide you with excellent care. Hearing back from our patients is one way we can continue to improve our services. Please take a few minutes to complete the written survey that you may receive in the mail after your visit with us. Thank you!             Your Updated Medication List - Protect others around you: Learn how to safely use, store and throw away your medicines at www.disposemymeds.org.          This list is accurate as of 3/1/18 10:23 AM.  Always use your most recent med list.                   Brand Name Dispense Instructions for use Diagnosis    acetaminophen 325 MG tablet    TYLENOL     Take 2 tablets (650 mg) by mouth every 4 hours as needed for mild pain, fever or headaches    Diffuse large B-cell lymphoma of extranodal site (H)       apixaban ANTICOAGULANT 5 MG tablet    ELIQUIS    180 tablet    Take 1 tablet (5 mg) by mouth 2 times daily    Paroxysmal atrial fibrillation (H)       ascorbic acid 500 MG Tabs     30 tablet    Take 1 tablet (500 mg) by mouth daily    Diffuse large B-cell lymphoma, unspecified body region (H)       atenolol 25 MG tablet     TENORMIN    60 tablet    Take 1 tablet (25 mg) by mouth 2 times daily    Atrial tachycardia (H)       calcium polycarbophil 625 MG tablet    FIBERCON     Take 1 tablet by mouth 2 times daily        calcium-vitamin D 600-400 MG-UNIT per tablet    CALTRATE    60 tablet    Take 2 tablets by mouth every morning    Diffuse large B-cell lymphoma, unspecified body region (H)       gabapentin 100 MG capsule    NEURONTIN    180 capsule    Take 2 capsules (200 mg) by mouth 3 times daily    Critical illness myopathy       HYDROCORTISONE PO      Take 100 mg by mouth IV        hydroxychloroquine 200 MG tablet    PLAQUENIL    60 tablet    Take 1 tablet (200 mg) by mouth 2 times daily    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 (H)       lidocaine visc 2% 2.5mL/5mL & maalox/mylanta w/ simeth 2.5mL/5mL & diphenhydrAMINE 5mg/5mL Susp suspension    [facility] Mouthwash [facility]     Swish and swallow 10 mLs in mouth 2 times daily        mirabegron 50 MG 24 hr tablet    MYRBETRIQ    30 tablet    Take 1 tablet (50 mg) by mouth daily    Urge incontinence       multivitamin, therapeutic with minerals Tabs tablet     30 each    Take 1 tablet by mouth daily    Diffuse large B-cell lymphoma, unspecified body region (H)       order for DME     1 Device    Equipment being ordered: BP cuff    Hypertension, unspecified type       potassium chloride 20 MEQ Packet    KLOR-CON    90 packet    Take 20 mEq by mouth daily    Hypokalemia       predniSONE 5 MG tablet    DELTASONE    90 tablet    Take 1 tablet (5 mg) by mouth daily    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 (H)

## 2018-03-01 NOTE — LETTER
3/1/2018       RE: Amelia Michel  [address]  [facility] [address]     Dear Colleague,    Thank you for referring your patient, Amelia Michel, to the [facility] UROLOGY AND INST FOR PROSTATE AND UROLOGIC CANCERS at [facility]. Please see a copy of my visit note below.    PREPROCEDURE DIAGNOSES:    1. Urinary frequency, urgency  2. Urinary incontinence    POSTPROCEDURE DIAGNOSES:  1. Urge urinary incontinence  2. Small bladder capacity (CHCF <130 mL)  3. UDS shows:  -Multiple episodes of DO starting at bladder volumes of 50 mL which the patient is unable to suppress. She leaks almost the entire contents of her bladder with Pdet pressures ranging into the mid 30's cm H2O.  -Fair/poor bladder compliance  -Small bladder capacity (CHCF <130 mL)  -Increased sensations  -No stress incontinence, but DO with incontinence as described above.   -Detrusor activity during voiding: fair/good detrusor contraction to 30.6 cm H2O with final PVR ~50 mL  -Detrusor sphincter dyssenergia: some increased EMG activity during voiding; unclear whether this is secondary to true DSD (no known neurological etiology) or voiding dysfunction.  -Fluoroscopy reveals a moderately trabeculated bladder wall with some cellules and diverticuli. No vesicoureteral reflux was observed.  The bladder neck was closed during filling and open during voiding and times of incontinence.    PROCEDURE:    1. Uroflowmetry.  2. Sterile urethral catheterization for measurement of postvoid residual urine volume.  3. Complex filling cystometrogram with measurement of bladder and rectal pressures.  4. Complex voiding cystometrogram with measurement of bladder and rectal pressures.  5. Electromyography of the pelvic floor during urodynamics.  6. Fluoroscopic imaging of the bladder during urodynamics, at least 3 views.    7. Interpretation of urodynamics and flouroscopic imaging.      INDICATIONS FOR PROCEDURE:  Ms. Amelia DUNAWAY  Marilu is a pleasant 57 year old female with urinary frequency, urgency, and urinary incontinence not responsive to Detrol. Baseline video urodynamic assessment is requested today to better characterize Ms. Amelia Michel's voiding dysfunction.      VOIDING DIARY:  No formal voiding diary completed. Per history:  Voids every 2 hours during the day, nocturia x 4-5.  Episodes of incontinence: multiple per day - goes through 2 Depends briefs per day/night.  Incontinence associated with: strong urge.    DESCRIPTION OF PROCEDURE:  Risks, benefits, and alternatives to urodynamics were discussed with the patient and she wished to proceed.  Urodynamics are planned to better assess the primary etiology for Ms. Michel's urologic dysfunction.  The patient last took anticholinergic medication 2 hours ago.  After informed consent was obtained, the patient was taken to the procedure room where uroflowmetry was performed. Findings below.     PRE-STUDY UROFLOWMETRY:  Voided volume: 120 mL.  Maximum flow rate: 15.9 mL/sec.  Average flow rate: 7.8 mL/sec.  Character of the curve: bell-shaped.  Postvoid residual by catheter: 75 mL.  Pretest urine dipstick was negative for nitrites, positive for small leukocytes and large blood. She does report intermittent blood with wiping after voiding. Noted to have a small labial fissure today which could be source of blood. She denies any UTI symptoms today.     Next a 7F double-lumen urodynamics catheter was inserted into the bladder under sterile technique via native urethra.  A 7F abdominal manometry catheter was placed in the rectum.  EMG pads were placed on both sides of the anal verge.  The bladder was filled with 200 mL of Cystografin at 15 mL/minute and serial pressures were recorded.  With coughing there was an appropriate rise in vesical and abdominal pressures with no change in detrusor pressure, confirming good study catheter placement.    DURING THE FILLING PHASE:  First sensation:  53 mL.  First Desire: 90 mL.  Strong Desire: 95 mL.  Maximum Capacity: 129 mL.    Uninhibited detrusor contractions: multiple episodes of DO present starting at 50 mL instilled which the patient is unable to suppress. She leaks almost the entire contents of her bladder with Pdet pressures ranging into the mid 30's cm H2O.  Compliance: fair/poor. PDet=31 cmH20 at capacity. Compliance ratio of 4.  Continence: no stress incontinence but DO with incontinence as described above.  EMG: concordant during filling.    DURING THE VOIDING PHASE:  Maximum detrusor contraction with void: 30.6 cm of H2O pressure.  Voided volume: ~50 mL.  Maximum flow rate: 2.4 mL/sec.  Average flow rate: 1.0 mL/sec.  Postvoid Residual: 50 mL.  Detrusor sphincter dyssynergia: some increased EMG activity during voiding; unclear whether this is secondary to true DSD (no known neurological etiology) or voiding dysfunction.  Character of voiding curve: intermittent.  BOOI: 22.7 (suggesting equivocal for obstruction - see key below)  [obstructed (ZHOU index [BOOI] ? 40); equivocal (no definite   obstruction; BOOI 20-40); and no obstruction (BOOI ? 20)]    FLUOROSCOPIC IMAGING OF THE BLADDER DURING URODYNAMICS:  Fluoroscopy during today's procedure demonstrated a moderately trabeculated bladder wall with some cellules and diverticuli. No vesicoureteral reflux was observed.  The bladder neck was closed during filling and open during voiding and times of incontinence.  After voiding to completion, all catheters were removed and the patient was brought back into the consultation room to further discuss today's study results.      ASSESSMENT/PLAN:  Ms. Amelia Michel is a pleasant 57 year old female with urinary frequency, urgency, and urinary incontinence who demonstrated the following findings today on urodynamic evaluation:    -Multiple episodes of DO starting at bladder volumes of 50 mL which the patient is unable to suppress. She leaks almost the  entire contents of her bladder with Pdet pressures ranging into the mid 30's cm H2O.  -Fair/poor compliance  -Small bladder capacity (group home <130 mL)  -Increased sensations  -No stress incontinence, but DO with incontinence as described above.   -Detrusor activity during voiding: fair/good detrusor contraction to 30.6 cm H2O with final PVR ~50 mL  -Detrusor sphincter dyssenergia: some increased EMG activity during voiding; unclear whether this is secondary to true DSD (no known neurological etiology) or voiding dysfunction.  -Fluoroscopy reveals a moderately trabeculated bladder wall with some cellules and diverticuli. No vesicoureteral reflux was observed.  The bladder neck was closed during filling and open during voiding and times of incontinence.     We discussed her study results in detail today. She has obvious DO with incontinence as well as a small capacity bladder with impaired compliance and heightened sensations.   -She had no improvement with Detrol LA 4 mg daily. Plan to stop this.  -Will switch to Myrbetriq 50 mg daily. Side effects discussed. Monitor BP's.  -Due to intermittent hematuria and severe urgency, will also schedule for cystoscopy with Dr. Sparks or Dr. Alfaro next available for intravesical examination.  -Patient scheduled for CT C/A/P w/w/o contrast (ordered by [facility]) which will complete her hematuria evaluation.   -Urine sent today for UA/UC, cytology.  -If no improvement with Myrbetriq, could further discuss bladder Botox injections or InterStim with urologist at the time of her cystoscopy appt.    - A single Cipro antibiotic was provided for UTI prophylaxis following completion of today's study per department protocol.  The risk of UTI with VUDS is low at ~2.5-3%.      Thank you for allowing me to participate in the care of Ms. Amelia Michel and please don't hesitate to contact me with any questions or concerns.        Again, thank you for allowing me to participate in the care of your  patient.      Sincerely,    Archana Green PA-C

## 2018-03-02 ENCOUNTER — HOSPITAL ENCOUNTER (EMERGENCY)
Facility: CLINIC | Age: 58
Discharge: HOME | End: 2018-03-03
Attending: EMERGENCY MEDICINE | Admitting: EMERGENCY MEDICINE
Payer: COMMERCIAL

## 2018-03-02 DIAGNOSIS — R00.2 PALPITATIONS: ICD-10-CM

## 2018-03-02 DIAGNOSIS — I48.0 PAROXYSMAL ATRIAL FIBRILLATION WITH RVR (H): ICD-10-CM

## 2018-03-02 LAB — COPATH REPORT: NORMAL

## 2018-03-02 PROCEDURE — 99291 CRITICAL CARE FIRST HOUR: CPT | Mod: 25 | Performed by: EMERGENCY MEDICINE

## 2018-03-02 PROCEDURE — 99291 CRITICAL CARE FIRST HOUR: CPT | Performed by: EMERGENCY MEDICINE

## 2018-03-02 NOTE — ED AVS SNAPSHOT
[facility], [place], Emergency Department    [address] [place] [address]    Phone:  [phone]                                       Amelia Michel   MRN: [id number]    Department:  [facility], Emergency Department   Date of Visit:  3/2/2018           After Visit Summary Signature Page     I have received my discharge instructions, and my questions have been answered. I have discussed any challenges I see with this plan with the nurse or doctor.    ..........................................................................................................................................  Patient/Patient Representative Signature      ..........................................................................................................................................  Patient Representative Print Name and Relationship to Patient    ..................................................               ................................................  Date                                            Time    ..........................................................................................................................................  Reviewed by Signature/Title    ...................................................              ..............................................  Date                                                            Time

## 2018-03-02 NOTE — ED AVS SNAPSHOT
[facility], Emergency Department    [address] [facility] [address]    Phone:  [phone]                                       Amelia Michel   MRN: [id number]    Department:  [facility], Emergency Department   Date of Visit:  3/2/2018           Patient Information     Date Of Birth          1960        Your diagnoses for this visit were:     Paroxysmal atrial fibrillation with RVR (H)     Palpitations        You were seen by Todd Max MD.        Discharge Instructions       Please make an appointment to follow up with Your Primary Care Provider (Comfort Sabillon [phone]) in a few days    Return to the ED if you are having chest pain, recurrent palpitations with fast rate, lightheadedness, or any other urgent/life-threatening concerns.       Future Appointments        Provider Department Dept Phone Center    3/6/2018 11:45 AM Ana Loera, PT [facility] Lymphedema [phone] [place]    3/8/2018 3:30 PM Kevin Sheldon MD [facility] Orthopaedic Clinic [phone] [facility]    3/13/2018 11:45 AM Ana Loera, PT [facility] Lymphedema [phone] [place]    3/30/2018 10:00 AM Pj Cunha MD, MD [facility] Rheumatology [phone] [facility]    4/6/2018 9:30 AM U CV MR IMAGING NURSE; [facility] MRI ROOM 4 [facility], [phone] [facility]    5/24/2018 12:00 PM [facility] CT ROOM 1 [facility] [phone] [facility]    5/24/2018 3:00 PM [facility] Lab Draw [phone] [facility]    5/24/2018 3:30 PM Lenore Rodriguez MD [facility] Blood and Marrow Transplant [phone] [facility]    6/6/2018 8:45 AM Vadim Sparks MD [facility] Urology and Inst for Prostate and Urologic Cancers [phone] [facility]    7/11/2018 10:00 AM Juan Eaton MD [facility] Heart Care [phone] [facility]      24 Hour Appointment Hotline       To make an appointment at any [facility], call [phone] ([phone]). If you  don't have a family doctor or clinic, we will help you find one. [facility] are conveniently located to serve the needs of you and your family.             Review of your medicines      Our records show that you are taking the medicines listed below. If these are incorrect, please call your family doctor or clinic.        Dose / Directions Last dose taken    acetaminophen 325 MG tablet   Commonly known as:  TYLENOL   Dose:  650 mg        Take 2 tablets (650 mg) by mouth every 4 hours as needed for mild pain, fever or headaches   Refills:  0        apixaban ANTICOAGULANT 5 MG tablet   Commonly known as:  ELIQUIS   Dose:  5 mg   Quantity:  180 tablet        Take 1 tablet (5 mg) by mouth 2 times daily   Refills:  3        ascorbic acid 500 MG Tabs   Dose:  500 mg   Quantity:  30 tablet        Take 1 tablet (500 mg) by mouth daily   Refills:  0        atenolol 25 MG tablet   Commonly known as:  TENORMIN   Dose:  25 mg   Quantity:  60 tablet        Take 1 tablet (25 mg) by mouth 2 times daily   Refills:  3        calcium polycarbophil 625 MG tablet   Commonly known as:  FIBERCON   Dose:  1 tablet   Indication:  loose stool        Take 1 tablet by mouth 2 times daily   Refills:  0        calcium-vitamin D 600-400 MG-UNIT per tablet   Commonly known as:  CALTRATE   Dose:  2 tablet   Quantity:  60 tablet        Take 2 tablets by mouth every morning   Refills:  0        gabapentin 100 MG capsule   Commonly known as:  NEURONTIN   Dose:  200 mg   Quantity:  180 capsule        Take 2 capsules (200 mg) by mouth 3 times daily   Refills:  3        HYDROCORTISONE PO   Dose:  100 mg        Take 100 mg by mouth IV   Refills:  0        hydroxychloroquine 200 MG tablet   Commonly known as:  PLAQUENIL   Dose:  200 mg   Quantity:  60 tablet        Take 1 tablet (200 mg) by mouth 2 times daily   Refills:  5        lidocaine visc 2% 2.5mL/5mL & maalox/mylanta w/ simeth 2.5mL/5mL & diphenhydrAMINE 5mg/5mL Susp suspension   Commonly  known as:  MAGIC Mouthwash [facility]   Dose:  10 mL        Swish and swallow 10 mLs in mouth 2 times daily   Refills:  0        mirabegron 50 MG 24 hr tablet   Commonly known as:  MYRBETRIQ   Dose:  50 mg   Quantity:  30 tablet        Take 1 tablet (50 mg) by mouth daily   Refills:  5        multivitamin, therapeutic with minerals Tabs tablet   Dose:  1 tablet   Quantity:  30 each        Take 1 tablet by mouth daily   Refills:  0        order for DME   Quantity:  1 Device        Equipment being ordered: BP cuff   Refills:  1        potassium chloride 20 MEQ Packet   Commonly known as:  KLOR-CON   Dose:  20 mEq   Quantity:  90 packet        Take 20 mEq by mouth daily   Refills:  3        predniSONE 5 MG tablet   Commonly known as:  DELTASONE   Dose:  5 mg   Quantity:  90 tablet        Take 1 tablet (5 mg) by mouth daily   Refills:  2                Procedures and tests performed during your visit     Procedure/Test Number of Times Performed    Basic metabolic panel 1    CBC with platelets differential 1    EKG 12 lead 2      Orders Needing Specimen Collection     None      Pending Results     Date and Time Order Name Status Description    3/2/2018 2351 EKG 12 lead Preliminary     3/1/2018 1020 URINE CULTURE AEROBIC BACTERIAL Preliminary             Pending Culture Results     Date and Time Order Name Status Description    3/1/2018 1020 URINE CULTURE AEROBIC BACTERIAL Preliminary             Pending Results Instructions     If you had any lab results that were not finalized at the time of your Discharge, you can call the ED Lab Result RN at [phone]. You will be contacted by this team for any positive Lab results or changes in treatment. The nurses are available 7 days a week from 10A to 6:30P.  You can leave a message 24 hours per day and they will return your call.        Thank you for choosing [facility]       Thank you for choosing [facility] for your care. Our goal is always to provide you with excellent care.  Hearing back from our patients is one way we can continue to improve our services. Please take a few minutes to complete the written survey that you may receive in the mail after you visit with us. Thank you!        [employer] Information     [employer] gives you secure access to your electronic health record. If you see a primary care provider, you can also send messages to your care team and make appointments. If you have questions, please call your primary care clinic.  If you do not have a primary care provider, please call [phone] and they will assist you.        Care EveryWhere ID     This is your Care EveryWhere ID. This could be used by other organizations to access your [facility] medical records  [id number]        Equal Access to Services     CATINA HEAD : Kay Flores, khari ibarra, krystle bae, durga inman. So [facility] [phone].    ATENCIÓN: Si habla español, tiene a sarmiento disposición servicios gratuitos de asistencia lingüística. Llame al [phone].    We comply with applicable federal civil rights laws and [place] laws. We do not discriminate on the basis of race, color, national origin, age, disability, sex, sexual orientation, or gender identity.            After Visit Summary       This is your record. Keep this with you and show to your community pharmacist(s) and doctor(s) at your next visit.

## 2018-03-03 VITALS
OXYGEN SATURATION: 99 % | WEIGHT: 121 LBS | SYSTOLIC BLOOD PRESSURE: 103 MMHG | BODY MASS INDEX: 25.4 KG/M2 | DIASTOLIC BLOOD PRESSURE: 76 MMHG | HEIGHT: 58 IN | TEMPERATURE: 98.6 F | RESPIRATION RATE: 20 BRPM | HEART RATE: 199 BPM

## 2018-03-03 LAB
ANION GAP SERPL CALCULATED.3IONS-SCNC: 10 MMOL/L (ref 3–14)
BACTERIA SPEC CULT: ABNORMAL
BASOPHILS # BLD AUTO: 0 10E9/L (ref 0–0.2)
BASOPHILS NFR BLD AUTO: 0.4 %
BUN SERPL-MCNC: 17 MG/DL (ref 7–30)
CALCIUM SERPL-MCNC: 9 MG/DL (ref 8.5–10.1)
CHLORIDE SERPL-SCNC: 102 MMOL/L (ref 94–109)
CO2 SERPL-SCNC: 24 MMOL/L (ref 20–32)
CREAT SERPL-MCNC: 0.68 MG/DL (ref 0.52–1.04)
DIFFERENTIAL METHOD BLD: ABNORMAL
EOSINOPHIL # BLD AUTO: 0.1 10E9/L (ref 0–0.7)
EOSINOPHIL NFR BLD AUTO: 2.4 %
ERYTHROCYTE [DISTWIDTH] IN BLOOD BY AUTOMATED COUNT: 12.3 % (ref 10–15)
GFR SERPL CREATININE-BSD FRML MDRD: 89 ML/MIN/1.7M2
GLUCOSE SERPL-MCNC: 89 MG/DL (ref 70–99)
HCT VFR BLD AUTO: 41.3 % (ref 35–47)
HGB BLD-MCNC: 13.7 G/DL (ref 11.7–15.7)
IMM GRANULOCYTES # BLD: 0 10E9/L (ref 0–0.4)
IMM GRANULOCYTES NFR BLD: 0.6 %
LYMPHOCYTES # BLD AUTO: 1 10E9/L (ref 0.8–5.3)
LYMPHOCYTES NFR BLD AUTO: 19.5 %
Lab: ABNORMAL
MCH RBC QN AUTO: 38.2 PG (ref 26.5–33)
MCHC RBC AUTO-ENTMCNC: 33.2 G/DL (ref 31.5–36.5)
MCV RBC AUTO: 115 FL (ref 78–100)
MONOCYTES # BLD AUTO: 0.8 10E9/L (ref 0–1.3)
MONOCYTES NFR BLD AUTO: 16.1 %
NEUTROPHILS # BLD AUTO: 3 10E9/L (ref 1.6–8.3)
NEUTROPHILS NFR BLD AUTO: 61 %
NRBC # BLD AUTO: 0 10*3/UL
NRBC BLD AUTO-RTO: 0 /100
PLATELET # BLD AUTO: 111 10E9/L (ref 150–450)
POTASSIUM SERPL-SCNC: 4.4 MMOL/L (ref 3.4–5.3)
RBC # BLD AUTO: 3.59 10E12/L (ref 3.8–5.2)
SODIUM SERPL-SCNC: 136 MMOL/L (ref 133–144)
SPECIMEN SOURCE: ABNORMAL
WBC # BLD AUTO: 4.9 10E9/L (ref 4–11)

## 2018-03-03 PROCEDURE — 93005 ELECTROCARDIOGRAM TRACING: CPT | Performed by: EMERGENCY MEDICINE

## 2018-03-03 PROCEDURE — 96375 TX/PRO/DX INJ NEW DRUG ADDON: CPT | Performed by: EMERGENCY MEDICINE

## 2018-03-03 PROCEDURE — 96376 TX/PRO/DX INJ SAME DRUG ADON: CPT | Mod: 59 | Performed by: EMERGENCY MEDICINE

## 2018-03-03 PROCEDURE — 96374 THER/PROPH/DIAG INJ IV PUSH: CPT | Performed by: EMERGENCY MEDICINE

## 2018-03-03 PROCEDURE — 25000125 ZZHC RX 250: Performed by: EMERGENCY MEDICINE

## 2018-03-03 PROCEDURE — 85025 COMPLETE CBC W/AUTO DIFF WBC: CPT | Performed by: EMERGENCY MEDICINE

## 2018-03-03 PROCEDURE — 92960 CARDIOVERSION ELECTRIC EXT: CPT | Mod: 59 | Performed by: EMERGENCY MEDICINE

## 2018-03-03 PROCEDURE — 80048 BASIC METABOLIC PNL TOTAL CA: CPT | Performed by: EMERGENCY MEDICINE

## 2018-03-03 PROCEDURE — 93005 ELECTROCARDIOGRAM TRACING: CPT | Mod: 76 | Performed by: EMERGENCY MEDICINE

## 2018-03-03 PROCEDURE — 92960 CARDIOVERSION ELECTRIC EXT: CPT | Performed by: EMERGENCY MEDICINE

## 2018-03-03 PROCEDURE — 93010 ELECTROCARDIOGRAM REPORT: CPT | Mod: Z6 | Performed by: EMERGENCY MEDICINE

## 2018-03-03 RX ORDER — ETOMIDATE 2 MG/ML
10 INJECTION INTRAVENOUS ONCE
Status: COMPLETED | OUTPATIENT
Start: 2018-03-03 | End: 2018-03-03

## 2018-03-03 RX ORDER — METOPROLOL TARTRATE 1 MG/ML
5 INJECTION, SOLUTION INTRAVENOUS ONCE
Status: COMPLETED | OUTPATIENT
Start: 2018-03-03 | End: 2018-03-03

## 2018-03-03 RX ADMIN — ETOMIDATE 5 MG: 2 INJECTION, SOLUTION INTRAVENOUS at 01:30

## 2018-03-03 RX ADMIN — METOPROLOL TARTRATE 5 MG: 5 INJECTION INTRAVENOUS at 00:47

## 2018-03-03 RX ADMIN — METOPROLOL TARTRATE 5 MG: 5 INJECTION INTRAVENOUS at 00:17

## 2018-03-03 NOTE — PROCEDURES
Risks, benefits, indications, alternatives discussed with patient. Patient demonstrated capacity and understanding, consented to procedure.   Verified correct patient, procedure, sedation plan with time out.   Patient monitored with cardiac monitor, end-tidal CO2, SpO2 throughout procedure.   Sedation performed by Dr. Black.   5 mg etomidate given at 0130.   Synchronized cardioversion at 100 J performed at 0133  Normal sinus rhythm noted on monitor  Sedation end at 0143  Procedure tolerated well, no immediate complications.     Todd Max MD  Emergency Medicine

## 2018-03-03 NOTE — DISCHARGE INSTRUCTIONS
Please make an appointment to follow up with Your Primary Care Provider (Comfort Sabillon [phone]) in a few days    Return to the ED if you are having chest pain, recurrent palpitations with fast rate, lightheadedness, or any other urgent/life-threatening concerns.

## 2018-03-03 NOTE — ED PROVIDER NOTES
"  History     Chief Complaint   Patient presents with     Shortness of Breath     HPI  Amelia Michel is a 57-year-old female with past medical history including diffuse B-cell lymphoma, paroxysmal atrial fibrillation, cardiac arrest in May 2017, who presents with palpitations. The patient reports that she began having palpitations around 7 PM, were intermittent at first. They are similar to past episodes of atrial fibrillation. She could feel her rate increasing as well around that time. No chest pain. She does have mild shortness of breath, mild headache, and mild nausea that she has had with prior episodes of atrial fibrillation. No recent fevers, vomiting, diarrhea, or urinary symptoms. The patient last had chemotherapy approximately 2.5 months ago.    I have reviewed the Medications, Allergies, Past Medical and Surgical History, and Social History in the Epic system.    Review of Systems  A complete 14-system review of systems was completed with pertinent positives and negatives noted in the HPI, otherwise negative.     Physical Exam   BP: (!) 121/93  Pulse: 199  Heart Rate: 153  Temp: 98.6  F (37  C)  Resp: 18  Height: 147.3 cm (4' 10\")  Weight: 54.9 kg (121 lb)  SpO2: 99 %      Physical Exam  BP (!) 116/102  Pulse 199  Temp 98.6  F (37  C) (Oral)  Resp 8  Ht 1.473 m (4' 10\")  Wt 54.9 kg (121 lb)  SpO2 97%  BMI 25.29 kg/m2  General: well-appearing, no acute distress.   HENT: MMM, no oropharyngeal lesions  Eyes: PERRL, normal sclerae, no conjunctival pallor  Neck: non-tender, supple  Cardio: tachycardic, irregularly irregular rhythm.   Resp: Normal work of breathing, clear breath sounds  Chest/Back: no visual signs of trauma, no CVA tenderness  Abdomen: no tenderness, non-distended, no rebound, no guarding  Neuro: alert and fully oriented. CN II-XII grossly intact. Grossly normal strength and sensation in all extremities.   MSK: no deformities.   Integumentary/Skin: no rash, normal color  Psych: normal " affect, normal behavior    ED Course     ED Course     Procedures         sedation, electrical cardioversion. See procedure notes for details.          EKG Interpretation:      Interpreted by Todd Max  Time reviewed: 0001  Symptoms at time of EKG: palpitations   Rhythm: atrial fibrillation - rapid  Rate: >160  Axis: Left Axis Deviation  Ectopy: none  Conduction: normal  ST Segments/ T Waves: Non-specific ST-T wave changes  Q Waves: none  Comparison to prior: rapid rate and atrial fibrillation new since ECG on 1/24/2018, but very similar to ECG on 8/9/2017    Clinical Impression: atrial fibrillation with rapid ventricular response, paroxysmal           EKG Interpretation:      Interpreted by Todd Max  Time reviewed:0147   Symptoms at time of EKG: None   Rhythm: Normal sinus   Rate: Normal  Axis: Normal  Ectopy: None and Premature atrial contraction  Conduction: Normal  ST Segments/ T Waves: No ST-T wave changes and No acute ischemic changes  Q Waves: None  Comparison to prior: now in NSR, no longer in atrial fibrillation with RVR    Clinical Impression: normal EKG        Critical Care time:  was 33 minutes for this patient excluding procedures.       Labs Ordered and Resulted from Time of ED Arrival Up to the Time of Departure from the ED   CBC WITH PLATELETS DIFFERENTIAL - Abnormal; Notable for the following:        Result Value    RBC Count 3.59 (*)      (*)     MCH 38.2 (*)     Platelet Count 111 (*)     All other components within normal limits   BASIC METABOLIC PANEL            Assessments & Plan (with Medical Decision Making)   In the ED the patient was afebrile, in atrial fibrillation with RVR, initial rate in the 170s-180s, History notable for palpitations starting this evening. Exam notable for irregularly irregular cardiac rhythm with tachycardic rate, with clear lungs. No chest pain to suggest ACS. Labs without electrolyte abnormalities. No fever or infectious symptoms to suggest  significant infection.     Patient with paroxysmal atrial fibrillation with RVR. Discussed potential options including rate control, chemical cardioversion, electrical cardioversion. After discussion of the risks, benefits, indications, and alternatives of different treatment options, the patient demonstrated capacity of understanding and elected for electrical cardioversion. NPO status adequate. The patient was premedicated with 5 mg metoprolol x2.    The patient sedated with 5 mg etomidate, cardioverted with 100 J synchronized. Successful cardioversion on first shock. Post-procedure ECG showed NSR without evidence of acute ischemia.     The complete clinical picture is most consistent with Afib w/ RVR, now in sinus rhythm after cardioversion. After counseling on the diagnosis, work-up, and treatment plan, the patient was dishcarged to home. The patient was advised to follow-up with her PCP in a few days. The patient was advised to return to the ED if recurrent symptoms, chest pain, or if there are any urgent/life-threatening concerns.       Clinical Impression:  Paroxysmal atrial fibrillation with rapid ventricular response  Palpitations       Todd Max MD  Emergency Medicine       I have reviewed the nursing notes.    I have reviewed the findings, diagnosis, plan and need for follow up with the patient.    Current Discharge Medication List          Final diagnoses:   Paroxysmal atrial fibrillation with RVR (H)   Palpitations       3/2/2018   [facility], [place], EMERGENCY DEPARTMENT     Todd Max MD  03/03/18 0242       Todd Max MD  03/03/18 0248

## 2018-03-04 ENCOUNTER — TELEPHONE (OUTPATIENT)
Dept: OTHER | Facility: CLINIC | Age: 58
End: 2018-03-04

## 2018-03-04 DIAGNOSIS — R30.0 DYSURIA: Primary | ICD-10-CM

## 2018-03-04 LAB — INTERPRETATION ECG - MUSE: NORMAL

## 2018-03-04 RX ORDER — CIPROFLOXACIN 500 MG/1
500 TABLET, FILM COATED ORAL 2 TIMES DAILY
Qty: 20 TABLET | Refills: 0 | Status: SHIPPED | OUTPATIENT
Start: 2018-03-04 | End: 2018-03-14

## 2018-03-04 NOTE — TELEPHONE ENCOUNTER
Telephone Encounter    Date: 12:06 PM; 3/4/2018  Patient Name: Amelia Michel  MRN: [id number]    Amelia Michel is 57 year old year old female recently seen for UDS with Archana Green.  At that time left a UCx, calls today requesting results.  UCx grew 10-50,000 Proteus, pan sensitive.  Given her symptoms, will treat this result.       Plans:  - Cipro 500 mg BID x 10 days called to [facility] pharmacy.  - Patient advised that because this is an encounter performed over the telephone, I am not able to perform a physical exam and thus any advice given is limited in nature and may not be completely informed.  The patient accepts this risk and if they have concerns with it they should be seen and evaluated in person by a medical professional.   - Discussed strict return precautions, including but not limited to: fevers > 101.4, chills, an inability to keep food or fluids down, increasing hematuria, and an inability to urinate.  - Patient expressed understanding and was in agreement with plan.    --    Sabas Day MD  Urology Resident

## 2018-03-07 ENCOUNTER — HOSPITAL ENCOUNTER (INPATIENT)
Facility: CLINIC | Age: 58
LOS: 6 days | Discharge: HOME HEALTH | DRG: 314 | End: 2018-03-13
Attending: INTERNAL MEDICINE | Admitting: INTERNAL MEDICINE
Payer: COMMERCIAL

## 2018-03-07 ENCOUNTER — RADIANT APPOINTMENT (OUTPATIENT)
Dept: GENERAL RADIOLOGY | Facility: CLINIC | Age: 58
End: 2018-03-07
Attending: PHYSICIAN ASSISTANT
Payer: COMMERCIAL

## 2018-03-07 ENCOUNTER — OFFICE VISIT (OUTPATIENT)
Dept: FAMILY MEDICINE | Facility: CLINIC | Age: 58
End: 2018-03-07
Payer: COMMERCIAL

## 2018-03-07 VITALS
OXYGEN SATURATION: 99 % | TEMPERATURE: 97.5 F | HEART RATE: 55 BPM | DIASTOLIC BLOOD PRESSURE: 89 MMHG | WEIGHT: 130 LBS | BODY MASS INDEX: 27.17 KG/M2 | SYSTOLIC BLOOD PRESSURE: 148 MMHG

## 2018-03-07 DIAGNOSIS — M05.742 RHEUMATOID ARTHRITIS INVOLVING BOTH HANDS WITH POSITIVE RHEUMATOID FACTOR (H): ICD-10-CM

## 2018-03-07 DIAGNOSIS — I48.0 PAROXYSMAL ATRIAL FIBRILLATION (H): Primary | ICD-10-CM

## 2018-03-07 DIAGNOSIS — C83.398 DIFFUSE LARGE B-CELL LYMPHOMA OF EXTRANODAL SITE: ICD-10-CM

## 2018-03-07 DIAGNOSIS — I89.0 LYMPHEDEMA OF BOTH LOWER EXTREMITIES: ICD-10-CM

## 2018-03-07 DIAGNOSIS — I30.9 ACUTE PERICARDITIS, UNSPECIFIED TYPE: ICD-10-CM

## 2018-03-07 DIAGNOSIS — I10 ESSENTIAL HYPERTENSION: ICD-10-CM

## 2018-03-07 DIAGNOSIS — I47.19 ATRIAL TACHYCARDIA (H): ICD-10-CM

## 2018-03-07 DIAGNOSIS — I27.0 PRIMARY PULMONARY HYPERTENSION (H): Primary | ICD-10-CM

## 2018-03-07 DIAGNOSIS — R06.09 DOE (DYSPNEA ON EXERTION): ICD-10-CM

## 2018-03-07 DIAGNOSIS — I48.0 PAF (PAROXYSMAL ATRIAL FIBRILLATION) (H): ICD-10-CM

## 2018-03-07 DIAGNOSIS — M05.741 RHEUMATOID ARTHRITIS INVOLVING BOTH HANDS WITH POSITIVE RHEUMATOID FACTOR (H): ICD-10-CM

## 2018-03-07 DIAGNOSIS — I50.33 ACUTE ON CHRONIC DIASTOLIC HEART FAILURE (H): ICD-10-CM

## 2018-03-07 DIAGNOSIS — M05.79 RHEUMATOID ARTHRITIS INVOLVING MULTIPLE SITES WITH POSITIVE RHEUMATOID FACTOR (H): ICD-10-CM

## 2018-03-07 DIAGNOSIS — I82.621 ACUTE DEEP VEIN THROMBOSIS (DVT) OF RIGHT UPPER EXTREMITY, UNSPECIFIED VEIN (H): ICD-10-CM

## 2018-03-07 DIAGNOSIS — I27.0 PRIMARY PULMONARY HYPERTENSION (H): ICD-10-CM

## 2018-03-07 PROBLEM — R00.2 PALPITATIONS: Status: ACTIVE | Noted: 2018-03-07

## 2018-03-07 LAB
ALBUMIN SERPL-MCNC: 3.7 G/DL (ref 3.4–5)
ALP SERPL-CCNC: 178 U/L (ref 40–150)
ALT SERPL W P-5'-P-CCNC: 232 U/L (ref 0–50)
ANION GAP SERPL CALCULATED.3IONS-SCNC: 11 MMOL/L (ref 3–14)
AST SERPL W P-5'-P-CCNC: 150 U/L (ref 0–45)
BASOPHILS # BLD AUTO: 0 10E9/L (ref 0–0.2)
BASOPHILS NFR BLD AUTO: 0.4 %
BILIRUB SERPL-MCNC: 0.8 MG/DL (ref 0.2–1.3)
BUN SERPL-MCNC: 22 MG/DL (ref 7–30)
CALCIUM SERPL-MCNC: 9.5 MG/DL (ref 8.5–10.1)
CHLORIDE SERPL-SCNC: 102 MMOL/L (ref 94–109)
CO2 SERPL-SCNC: 17 MMOL/L (ref 20–32)
CREAT SERPL-MCNC: 0.66 MG/DL (ref 0.52–1.04)
DIFFERENTIAL METHOD BLD: ABNORMAL
EOSINOPHIL # BLD AUTO: 0 10E9/L (ref 0–0.7)
EOSINOPHIL NFR BLD AUTO: 0.2 %
ERYTHROCYTE [DISTWIDTH] IN BLOOD BY AUTOMATED COUNT: 13 % (ref 10–15)
GFR SERPL CREATININE-BSD FRML MDRD: >90 ML/MIN/1.7M2
GLUCOSE SERPL-MCNC: 103 MG/DL (ref 70–99)
HCT VFR BLD AUTO: 40.8 % (ref 35–47)
HGB BLD-MCNC: 13.3 G/DL (ref 11.7–15.7)
IMM GRANULOCYTES # BLD: 0.1 10E9/L (ref 0–0.4)
IMM GRANULOCYTES NFR BLD: 1 %
INR PPP: 3.42 (ref 0.86–1.14)
INTERPRETATION ECG - MUSE: NORMAL
LYMPHOCYTES # BLD AUTO: 0.3 10E9/L (ref 0.8–5.3)
LYMPHOCYTES NFR BLD AUTO: 5.2 %
MCH RBC QN AUTO: 37.6 PG (ref 26.5–33)
MCHC RBC AUTO-ENTMCNC: 32.6 G/DL (ref 31.5–36.5)
MCV RBC AUTO: 115 FL (ref 78–100)
MONOCYTES # BLD AUTO: 0.7 10E9/L (ref 0–1.3)
MONOCYTES NFR BLD AUTO: 14.9 %
NEUTROPHILS # BLD AUTO: 3.8 10E9/L (ref 1.6–8.3)
NEUTROPHILS NFR BLD AUTO: 78.3 %
NRBC # BLD AUTO: 0.1 10*3/UL
NRBC BLD AUTO-RTO: 3 /100
PLATELET # BLD AUTO: 88 10E9/L (ref 150–450)
POTASSIUM SERPL-SCNC: 4.7 MMOL/L (ref 3.4–5.3)
PROT SERPL-MCNC: 6.4 G/DL (ref 6.8–8.8)
RBC # BLD AUTO: 3.54 10E12/L (ref 3.8–5.2)
SODIUM SERPL-SCNC: 129 MMOL/L (ref 133–144)
WBC # BLD AUTO: 4.8 10E9/L (ref 4–11)

## 2018-03-07 PROCEDURE — 99223 1ST HOSP IP/OBS HIGH 75: CPT | Mod: GC | Performed by: INTERNAL MEDICINE

## 2018-03-07 PROCEDURE — 93005 ELECTROCARDIOGRAM TRACING: CPT

## 2018-03-07 PROCEDURE — 36415 COLL VENOUS BLD VENIPUNCTURE: CPT | Performed by: INTERNAL MEDICINE

## 2018-03-07 PROCEDURE — 71046 X-RAY EXAM CHEST 2 VIEWS: CPT | Mod: FY

## 2018-03-07 PROCEDURE — 93000 ELECTROCARDIOGRAM COMPLETE: CPT | Performed by: PHYSICIAN ASSISTANT

## 2018-03-07 PROCEDURE — 40000556 ZZH STATISTIC PERIPHERAL IV START W US GUIDANCE

## 2018-03-07 PROCEDURE — 85610 PROTHROMBIN TIME: CPT | Performed by: INTERNAL MEDICINE

## 2018-03-07 PROCEDURE — 80053 COMPREHEN METABOLIC PANEL: CPT | Performed by: INTERNAL MEDICINE

## 2018-03-07 PROCEDURE — 93010 ELECTROCARDIOGRAM REPORT: CPT | Performed by: INTERNAL MEDICINE

## 2018-03-07 PROCEDURE — 99207 ZZC NO CHARGE NURSE ONLY: CPT | Performed by: PHYSICIAN ASSISTANT

## 2018-03-07 PROCEDURE — 25000132 ZZH RX MED GY IP 250 OP 250 PS 637: Performed by: INTERNAL MEDICINE

## 2018-03-07 PROCEDURE — 21400006 ZZH R&B CCU INTERMEDIATE UMMC

## 2018-03-07 PROCEDURE — 85025 COMPLETE CBC W/AUTO DIFF WBC: CPT | Performed by: INTERNAL MEDICINE

## 2018-03-07 RX ORDER — AMOXICILLIN 250 MG
2 CAPSULE ORAL 2 TIMES DAILY PRN
Status: DISCONTINUED | OUTPATIENT
Start: 2018-03-07 | End: 2018-03-13 | Stop reason: HOSPADM

## 2018-03-07 RX ORDER — ATENOLOL 25 MG/1
25 TABLET ORAL 2 TIMES DAILY
Status: DISCONTINUED | OUTPATIENT
Start: 2018-03-07 | End: 2018-03-08

## 2018-03-07 RX ORDER — AMOXICILLIN 250 MG
1 CAPSULE ORAL 2 TIMES DAILY PRN
Status: DISCONTINUED | OUTPATIENT
Start: 2018-03-07 | End: 2018-03-13 | Stop reason: HOSPADM

## 2018-03-07 RX ORDER — TOLTERODINE 4 MG/1
4 CAPSULE, EXTENDED RELEASE ORAL DAILY
COMMUNITY
Start: 2018-02-24 | End: 2018-05-10

## 2018-03-07 RX ORDER — MAGNESIUM SULFATE HEPTAHYDRATE 40 MG/ML
4 INJECTION, SOLUTION INTRAVENOUS EVERY 4 HOURS PRN
Status: DISCONTINUED | OUTPATIENT
Start: 2018-03-07 | End: 2018-03-13 | Stop reason: HOSPADM

## 2018-03-07 RX ORDER — GABAPENTIN 100 MG/1
200 CAPSULE ORAL 3 TIMES DAILY
Status: DISCONTINUED | OUTPATIENT
Start: 2018-03-07 | End: 2018-03-13 | Stop reason: HOSPADM

## 2018-03-07 RX ORDER — TOLTERODINE 4 MG/1
4 CAPSULE, EXTENDED RELEASE ORAL DAILY
Status: DISCONTINUED | OUTPATIENT
Start: 2018-03-08 | End: 2018-03-13 | Stop reason: HOSPADM

## 2018-03-07 RX ORDER — MULTIPLE VITAMINS W/ MINERALS TAB 9MG-400MCG
1 TAB ORAL DAILY
Status: DISCONTINUED | OUTPATIENT
Start: 2018-03-07 | End: 2018-03-13 | Stop reason: HOSPADM

## 2018-03-07 RX ORDER — PREDNISONE 5 MG/1
5 TABLET ORAL DAILY
Status: DISCONTINUED | OUTPATIENT
Start: 2018-03-08 | End: 2018-03-13 | Stop reason: HOSPADM

## 2018-03-07 RX ORDER — POTASSIUM CHLORIDE 750 MG/1
20-40 TABLET, EXTENDED RELEASE ORAL
Status: DISCONTINUED | OUTPATIENT
Start: 2018-03-07 | End: 2018-03-13 | Stop reason: HOSPADM

## 2018-03-07 RX ORDER — POTASSIUM CHLORIDE 7.45 MG/ML
10 INJECTION INTRAVENOUS
Status: DISCONTINUED | OUTPATIENT
Start: 2018-03-07 | End: 2018-03-13 | Stop reason: HOSPADM

## 2018-03-07 RX ORDER — NALOXONE HYDROCHLORIDE 0.4 MG/ML
.1-.4 INJECTION, SOLUTION INTRAMUSCULAR; INTRAVENOUS; SUBCUTANEOUS
Status: DISCONTINUED | OUTPATIENT
Start: 2018-03-07 | End: 2018-03-13 | Stop reason: HOSPADM

## 2018-03-07 RX ORDER — POTASSIUM CHLORIDE 1.5 G/1.58G
20-40 POWDER, FOR SOLUTION ORAL
Status: DISCONTINUED | OUTPATIENT
Start: 2018-03-07 | End: 2018-03-13 | Stop reason: HOSPADM

## 2018-03-07 RX ORDER — CIPROFLOXACIN 500 MG/1
500 TABLET, FILM COATED ORAL 2 TIMES DAILY
Status: DISCONTINUED | OUTPATIENT
Start: 2018-03-07 | End: 2018-03-13 | Stop reason: HOSPADM

## 2018-03-07 RX ORDER — CALCIUM POLYCARBOPHIL 625 MG 625 MG/1
625 TABLET ORAL 2 TIMES DAILY
Status: DISCONTINUED | OUTPATIENT
Start: 2018-03-07 | End: 2018-03-13 | Stop reason: HOSPADM

## 2018-03-07 RX ORDER — POTASSIUM CHLORIDE 1.5 G/1.58G
20 POWDER, FOR SOLUTION ORAL DAILY
Status: DISCONTINUED | OUTPATIENT
Start: 2018-03-08 | End: 2018-03-13 | Stop reason: HOSPADM

## 2018-03-07 RX ORDER — ACETAMINOPHEN 325 MG/1
650 TABLET ORAL EVERY 4 HOURS PRN
Status: DISCONTINUED | OUTPATIENT
Start: 2018-03-07 | End: 2018-03-13 | Stop reason: HOSPADM

## 2018-03-07 RX ORDER — POTASSIUM CHLORIDE 29.8 MG/ML
20 INJECTION INTRAVENOUS
Status: DISCONTINUED | OUTPATIENT
Start: 2018-03-07 | End: 2018-03-13 | Stop reason: HOSPADM

## 2018-03-07 RX ORDER — POTASSIUM CL/LIDO/0.9 % NACL 10MEQ/0.1L
10 INTRAVENOUS SOLUTION, PIGGYBACK (ML) INTRAVENOUS
Status: DISCONTINUED | OUTPATIENT
Start: 2018-03-07 | End: 2018-03-13 | Stop reason: HOSPADM

## 2018-03-07 RX ORDER — HYDROXYCHLOROQUINE SULFATE 200 MG/1
200 TABLET, FILM COATED ORAL 2 TIMES DAILY
Status: DISCONTINUED | OUTPATIENT
Start: 2018-03-07 | End: 2018-03-13 | Stop reason: HOSPADM

## 2018-03-07 RX ORDER — ASCORBIC ACID 500 MG
500 TABLET ORAL DAILY
Status: DISCONTINUED | OUTPATIENT
Start: 2018-03-07 | End: 2018-03-13 | Stop reason: HOSPADM

## 2018-03-07 RX ADMIN — HYDROXYCHLOROQUINE SULFATE 200 MG: 200 TABLET, FILM COATED ORAL at 20:53

## 2018-03-07 RX ADMIN — APIXABAN 5 MG: 5 TABLET, FILM COATED ORAL at 20:53

## 2018-03-07 RX ADMIN — GABAPENTIN 200 MG: 100 CAPSULE ORAL at 17:51

## 2018-03-07 RX ADMIN — ATENOLOL 25 MG: 25 TABLET ORAL at 20:53

## 2018-03-07 RX ADMIN — CIPROFLOXACIN HYDROCHLORIDE 500 MG: 500 TABLET, FILM COATED ORAL at 20:53

## 2018-03-07 RX ADMIN — GABAPENTIN 200 MG: 100 CAPSULE ORAL at 20:53

## 2018-03-07 NOTE — PLAN OF CARE
Problem: Patient Care Overview  Goal: Plan of Care/Patient Progress Review  Pt refusing all vitamins this evening because of the reaction with Cipro.

## 2018-03-07 NOTE — IP AVS SNAPSHOT
MRN:[id number]                      After Visit Summary   3/7/2018    Amelia Michel    MRN: [id number]           Thank you!     Thank you for choosing [facility] for your care. Our goal is always to provide you with excellent care. Hearing back from our patients is one way we can continue to improve our services. Please take a few minutes to complete the written survey that you may receive in the mail after you visit with us. Thank you!        Patient Information     Date Of Birth          1960        Designated Caregiver       Most Recent Value    Caregiver    Will someone help with your care after discharge? yes    Name of designated caregiver Wolf Michel (spouse)    Phone number of caregiver [phone]    Caregiver address [address], [place] [address]      About your hospital stay     You were admitted on:  March 7, 2018 You last received care in the:  [address] [facility]    You were discharged on:  March 13, 2018        Reason for your hospital stay       You were hospitalized for fluid overload from your heart which improved with IV diuretics. You were started on a new diuretic medication (spironolactone). Because this medication can increase your potassium level, you should also stop your potassium supplement.     You had a cardiac MRI which showed that the area surrounding your heart was inflamed. You were started on an anti-inflammatory medication (colchicine) which should be continued for the next four weeks. You will have a repeat cardiac MRI in one month to follow this as well as cardiology follow-up in one month.     While in the hospital, you underwent a cardioversion to get you back to your normal heart rhythm. Since then, you remained in a normal rhythm and you should continue your anticoagulation and your atenolol.                  Who to Call     For medical emergencies, please call 911.  For non-urgent questions about your medical care, please call your primary care  provider or clinic, [phone]  For questions related to your surgery, please call your surgery clinic        Attending Provider     Provider Specialty    Juan Eaton MD Clinical Cardiac Electrophysiology       Primary Care Provider Office Phone # Fax #    Comfort Sabillon [phone]       When to contact your care team       Call your primary doctor if you have any of the following:  increased shortness of breath, increased swelling, chest pain, lightheadedness.                  After Care Instructions     Activity       Your activity upon discharge: activity as tolerated            Diet       Follow this diet upon discharge:       Low Saturated Fat Na <2400 mg                  Follow-up Appointments     Adult [facility] Follow-up and recommended labs and tests       Follow up with primary care provider, Comfort Sabillon, within 14 days for hospital follow- up.  The following labs/tests are recommended: BMP.      Follow up with Dr. Eaton, at [facility], in one month regarding new diagnosis. The following labs/tests are recommended: BMP. Patient is scheduled on Wednesday, 4/11/18 at 10:00am with Dr. Eaton.    Appointments on [place] and/or [facility] (with [facility] or [facility] provider or service). Call [phone] if you haven't heard regarding these appointments within 7 days of discharge.                  Your next 10 appointments already scheduled     Mar 20, 2018 11:45 AM CDT   Cancer Rehab Treatment with Ana Loera, PT   [facility], D Lo Lymphedema ([facility], [facility])    [address] [place] [address]  1st Floor  [address]  [facility] [id number] [phone]            Mar 30, 2018 10:00 AM CDT   (Arrive by 9:45 AM)   Return Visit with Pj Cunha MD   [facility] ([facility])    [address]  [address]  [facility] [id number] [phone]            Apr 06, 2018  9:30 AM CDT    (Arrive by 9:15 AM)   MR MYOCARDIUM W CONTRAST with UUMR4, UU CV MR NURSE   [facility] [place], MRI ([facility] [place])    [address] [address] [phone]           Take your medicines as usual, unless your doctor tells you not to. Bring a list of your current medicines to your exam (including vitamins, minerals and over-the-counter drugs).  You may or may not receive intravenous (IV) contrast for this exam pending the discretion of the Radiologist.  You do not need to do anything special to prepare.  The MRI machine uses a strong magnet. Please wear clothes without metal (snaps, zippers). A sweatsuit works well, or we may give you a hospital gown.  Please remove any body piercings and hair extensions before you arrive. You will also remove watches, jewelry, hairpins, wallets, dentures, partial dental plates and hearing aids. You may wear contact lenses, and you may be able to wear your rings. We have a safe place to keep your personal items, but it is safer to leave them at home.  **IMPORTANT** THE INSTRUCTIONS BELOW ARE ONLY FOR THOSE PATIENTS WHO HAVE BEEN PRESCRIBED SEDATION OR GENERAL ANESTHESIA DURING THEIR MRI PROCEDURE:  IF YOUR DOCTOR PRESCRIBED ORAL SEDATION (take medicine to help you relax during your exam):   You must get the medicine from your doctor (oral medication) before you arrive. Bring the medicine to the exam. Do not take it at home. You ll be told when to take it upon arriving for your exam.   Arrive one hour early. Bring someone who can take you home after the test. Your medicine will make you sleepy. After the exam, you may not drive, take a bus or take a taxi by yourself.  IF YOUR DOCTOR PRESCRIBED IV SEDATION:   Arrive one hour early. Bring someone who can take you home after the test. Your medicine will make you sleepy. After the exam, you may not drive, take a bus or take a taxi by yourself.   No eating 6 hours  before your exam. You may have clear liquids up until 4 hours before your exam. (Clear liquids include water, clear tea, black coffee and fruit juice without pulp.)  IF YOUR DOCTOR PRESCRIBED ANESTHESIA (be asleep for your exam):   Arrive 1 1/2 hours early. Bring someone who can take you home after the test. You may not drive, take a bus or take a taxi by yourself.   No eating 8 hours before your exam. You may have clear liquids up until 4 hours before your exam. (Clear liquids include water, clear tea, black coffee and fruit juice without pulp.)   You will spend four to five hours in the recovery room.  Please call the Imaging Department at your exam site with any questions.            Apr 11, 2018 10:00 AM CDT   (Arrive by 9:45 AM)   RETURN ARRHYTHMIA with Juan Eaton MD   [facility] ([facility])    [address] [place] [address]  [address]  [facility] [address] [phone]            May 24, 2018 12:00 PM CDT   (Arrive by 11:45 AM)   CT CHEST ABDOMEN PELVIS W/O & W CONTRAST with UCCT1   [facility] CT ([facility])    [address]  1st Floor  [facility] [address] [phone]           Please bring any scans or X-rays taken at other hospitals, if similar tests were done. Also bring a list of your medicines, including vitamins, minerals and over-the-counter drugs. It is safest to leave personal items at home.  Be sure to tell your doctor:   If you have any allergies.   If there s any chance you are pregnant.   If you are breastfeeding.  You may have contrast for this exam. To prepare:   Do not eat or drink for 2 hours before your exam. If you need to take medicine, you may take it with small sips of water. (We may ask you to take liquid medicine as well.)   The day before your exam, drink extra fluids at least six 8-ounce glasses (unless your doctor tells you to restrict your fluids).   You will be given instructions on how to  drink the contrast.  Patients over 70 or patients with diabetes or kidney problems:   If you haven t had a blood test (creatinine test) within the last 30 days, the Cardiologist/Radiologist may require you to get this test prior to your exam.  If you have diabetes:   Continue to take your metformin medication on the day of your exam  Please wear loose clothing, such as a sweat suit or jogging clothes. Avoid snaps, zippers and other metal. We may ask you to undress and put on a hospital gown.  If you have any questions, please call the Imaging Department where you will have your exam.            May 24, 2018  3:00 PM CDT   [facility] Lab Draw with  MASON[facility] LAB DRAW   [facility] Masonic Lab Draw ([facility])    [address]  [address]  [facility] [address]   [phone]            May 24, 2018  3:30 PM CDT   RETURN ONC with Lenore Rodriguez MD   [facility] Blood and Marrow Transplant ([facility])    [address]  [address]  [facility] [address]   [phone]            Jun 06, 2018  8:45 AM CDT   (Arrive by 8:30 AM)   Cystoscopy with Vadim Sparks MD   [facility]rology and [email or web address] for Prostate and Urologic Cancers ([facility])    [address] [place] [address]  4th Floor  [facility] [address] [phone]            Jul 11, 2018 10:00 AM CDT   (Arrive by 9:45 AM)   RETURN ARRHYTHMIA with Juan Eaton MD   [facility] Heart Care ([facility])    [address] [place] [address]  [address]  [facility] [address] [phone]              Additional Services     Home care nursing referral       [facility]   Phone: [phone]    For resumption of care.  Palliative home care program.  RN evaluation post hospitalization. Assess vital signs, respiratory and cardiac status. Wound care weekly.     Your provider has ordered home care nursing services. If you have not been contacted within 2 days of  your discharge please call the inpatient department phone number at [phone] .            Medication Therapy Management Referral       Reason for referral:  on more than 10 medications and hospitalized or in the ED in the past 6 months     This service is designed to help you get the most from your medications.  A specially trained pharmacist will work closely with you and your doctors  to solve any problems related to your medications and to help you get the   best results from taking them.      The Medication Therapy Management staff will call you to schedule an appointment.                  General Recommendations To Control Heart Failure When You Get Home       Heart Failure Instructions for Patients and Families: Please read and check off each of these important instructions as you do them when you get home.          Weight and Symptoms       ___ Put a scale in your bathroom.    ___ Post a weight chart or calendar next to your scale.    ___ Weigh yourself everyday as soon as you get up in the morning (before breakfast).  You should only be wearing your pajamas.  Write your weight on the chart/calendar.  ___ Bring your weight chart/calendar with you to all appointments.  ___ Call your doctor or nurse practitioner if you gain 2 pounds (in 1 day) or 5 pounds in (1 week) from your goal  good  weight.  Your good weight is also called your  dry  weight.  Your doctor or nurse will tell you what your good weight should be.    ___ Call your doctor or nurse practitioner if you have shortness of breath that gets worse over time, leg swelling or fatigue.       Medications and Diet       ___ Make sure to take your medication as prescribed.    ___ Bring a current list of your medication and all of your medicine bottles with you to all appointments.    ___ Limit fluids if you still have swelling or shortness of breath, or if your doctor tells you to do so.    ___ Eat less than 2000 mg of sodium (salt) every day. Read food  labels, and do not add salt to meals. Remember, if you eat less salt you retain less fluid.  ___ Follow a heart healthy diet that is low in saturated fat.        Activity and Suggested Lifestyle Changes      ___ Stay active. Talk to your doctor about an exercise program that is safe for your heart.  ___ Stop smoking. Reduce alcohol use.      ___ Lose weight if you are overweight. Extra weight puts a lot of stress on the heart.                 Control for Leg Swelling     ___ Keep your legs elevated (raised) as needed for swelling. If swelling is uncomfortable or elevation doesn t help, ask your doctor about using ACE wraps or support stockings.        What is the [facility].R[facility] Clinic?  Cardiomyopathy  Optimization  Rehabilitation  Education    The [facility].O.R.E. Clinic is a heart failure specialty clinic within [facility].  It is an outpatient disease management program that is based on a phase-by-phase approach, which is tailored to each patient s individual needs.  The cardiologist, nurse practitioner, physician assistant and nurses provide an ongoing outpatient care and treatment plan that guides heart failure and cardiomyopathy patients from evaluation and education to stabilization. This team works with your current primary care doctor and cardiologist to help you:    - Avoid hospitalizations  - Slow the progression of the disease  - Improve length and quality of life  - Know who and when to call if heart failure symptoms appear  - Receive easy access to quality health care and advice  - Better understand your condition and treatment  - Decrease the tremendous cost burden of heart failure care  - Detect future heart problems before they become life threatening                                 Follow-up Appointments     ___ An appointment with the [facility] Clinic may already have been made for you (see section   Your next appointments already scheduled ).  If you have a question about your appointment, would like  to speak with a C.O.R.E. nurse, or would like to become a [facility].O.R.E. Clinic patient, please call one of the following locations:     - [facility] ([place]):                                             [phone]  - [facility] ([place]):                                            [phone]  - [facility] ([place]):                 [phone]      ___ Your C.O.R.E. Clinic Team will continue to educate you on your heart failure and may adjust medications based on your vital signs, lab work, and how you are feeling.  Therefore, it is very important to bring the following to all C.O.R.E. appointments:    - An accurate list of your medications  - Your medicine bottles  - Your weight chart/calendar                   Pending Results     Date and Time Order Name Status Description    3/12/2018 1243 EKG 12-lead, complete Preliminary     3/12/2018 0947 Cardioversion In process             Statement of Approval     Ordered          03/13/18 1403  I have reviewed and agree with all the recommendations and orders detailed in this document.  EFFECTIVE NOW     Approved and electronically signed by:  Kelly mSith MD             Admission Information     Date & Time Provider Department Dept. Phone    3/7/2018 Juan Eaton MD [address] [facility] [phone]      Your Vitals Were     Blood Pressure Pulse Temperature Respirations Weight Pulse Oximetry    122/90 (BP Location: Right arm, Cuff Size: Adult Small) 57 98.1  F (36.7  C) (Oral) 16 53.5 kg (118 lb) 97%    BMI (Body Mass Index)                   24.66 kg/m2           [employer] Information     [employer] gives you secure access to your electronic health record. If you see a primary care provider, you can also send messages to your care team and make appointments. If you have questions, please call your primary care clinic.  If you do not have a primary care provider, please call [phone] and they will  assist you.        Care EveryWhere ID     This is your Care EveryWhere ID. This could be used by other organizations to access your [place] medical records  [id number]        Equal Access to Services     CATINA HEAD : Kay Flores, watashalana meyerlettyha, emelyta kadelbert bae, durga ednain hayaaall juárezja [place] inman. So [facility] [phone].    ATENCIÓN: Si habla español, tiene a sarmiento disposición servicios gratuitos de asistencia lingüística. Llame al [phone].    We comply with applicable federal civil rights laws and [place] laws. We do not discriminate on the basis of race, color, national origin, age, disability, sex, sexual orientation, or gender identity.               Review of your medicines      START taking        Dose / Directions    colchicine 0.6 MG tablet   Used for:  Acute pericarditis, unspecified type        Dose:  0.6 mg   Start taking on:  3/14/2018   Take 1 tablet (0.6 mg) by mouth daily   Quantity:  30 tablet   Refills:  0       spironolactone 25 MG tablet   Commonly known as:  ALDACTONE   Used for:  Acute on chronic diastolic heart failure (H)        Dose:  25 mg   Start taking on:  3/14/2018   Take 1 tablet (25 mg) by mouth daily   Quantity:  30 tablet   Refills:  0         CONTINUE these medicines which have NOT CHANGED        Dose / Directions    acetaminophen 325 MG tablet   Commonly known as:  TYLENOL   Used for:  Diffuse large B-cell lymphoma of extranodal site (H)        Dose:  650 mg   Take 2 tablets (650 mg) by mouth every 4 hours as needed for mild pain, fever or headaches   Refills:  0       apixaban ANTICOAGULANT 5 MG tablet   Commonly known as:  ELIQUIS   Used for:  Paroxysmal atrial fibrillation (H)        Dose:  5 mg   Take 1 tablet (5 mg) by mouth 2 times daily   Quantity:  180 tablet   Refills:  3       ascorbic acid 500 MG Tabs   Used for:  Diffuse large B-cell lymphoma, unspecified body region (H)        Dose:  500 mg   Take 1 tablet (500 mg) by mouth  daily   Quantity:  30 tablet   Refills:  0       atenolol 25 MG tablet   Commonly known as:  TENORMIN   Used for:  Atrial tachycardia (H)        Dose:  25 mg   Take 1 tablet (25 mg) by mouth 2 times daily   Quantity:  60 tablet   Refills:  3       calcium polycarbophil 625 MG tablet   Commonly known as:  FIBERCON   Indication:  loose stool        Dose:  1 tablet   Take 1 tablet by mouth 2 times daily   Refills:  0       calcium-vitamin D 600-400 MG-UNIT per tablet   Commonly known as:  CALTRATE   Used for:  Diffuse large B-cell lymphoma, unspecified body region (H)        Dose:  2 tablet   Take 2 tablets by mouth every morning   Quantity:  60 tablet   Refills:  0       ciprofloxacin 500 MG tablet   Commonly known as:  CIPRO   Used for:  Dysuria        Dose:  500 mg   Take 1 tablet (500 mg) by mouth 2 times daily for 10 days   Quantity:  20 tablet   Refills:  0       gabapentin 100 MG capsule   Commonly known as:  NEURONTIN   Used for:  Critical illness myopathy        Dose:  200 mg   Take 2 capsules (200 mg) by mouth 3 times daily   Quantity:  180 capsule   Refills:  3       HYDROCORTISONE PO        Dose:  100 mg   Take 100 mg by mouth IV   Refills:  0       hydroxychloroquine 200 MG tablet   Commonly known as:  PLAQUENIL   Used for:  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 (H)        Dose:  200 mg   Take 1 tablet (200 mg) by mouth 2 times daily   Quantity:  60 tablet   Refills:  5       multivitamin, therapeutic with minerals Tabs tablet   Used for:  Diffuse large B-cell lymphoma, unspecified body region (H)        Dose:  1 tablet   Take 1 tablet by mouth daily   Quantity:  30 each   Refills:  0       order for DME   Used for:  Hypertension, unspecified type        Equipment being ordered: BP cuff   Quantity:  1 Device   Refills:  1       predniSONE 5 MG tablet   Commonly known as:  DELTASONE   Used for:  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 (H)         Dose:  5 mg   Take 1 tablet (5 mg) by mouth daily   Quantity:  90 tablet   Refills:  2       tolterodine 4 MG 24 hr capsule   Commonly known as:  DETROL LA        Refills:  0         STOP taking     potassium chloride 20 MEQ Packet   Commonly known as:  KLOR-CON                Where to get your medicines      These medications were sent to [facility] - [place], [place] - [address], [facility] [address]     Phone:  [phone]     colchicine 0.6 MG tablet    hydroxychloroquine 200 MG tablet    spironolactone 25 MG tablet                Protect others around you: Learn how to safely use, store and throw away your medicines at www.disposemymeds.org.        ANTIBIOTIC INSTRUCTION     You've Been Prescribed an Antibiotic - Now What?  Your healthcare team thinks that you or your loved one might have an infection. Some infections can be treated with antibiotics, which are powerful, life-saving drugs. Like all medications, antibiotics have side effects and should only be used when necessary. There are some important things you should know about your antibiotic treatment.      Your healthcare team may run tests before you start taking an antibiotic.    Your team may take samples (e.g., from your blood, urine or other areas) to run tests to look for bacteria. These test can be important to determine if you need an antibiotic at all and, if you do, which antibiotic will work best.      Within a few days, your healthcare team might change or even stop your antibiotic.    Your team may start you on an antibiotic while they are working to find out what is making you sick.    Your team might change your antibiotic because test results show that a different antibiotic would be better to treat your infection.    In some cases, once your team has more information, they learn that you do not need an antibiotic at all. They may find out that you don't have an infection, or that  the antibiotic you're taking won't work against your infection. For example, an infection caused by a virus can't be treated with antibiotics. Staying on an antibiotic when you don't need it is more likely to be harmful than helpful.      You may experience side effects from your antibiotic.    Like all medications, antibiotics have side effects. Some of these can be serious.    Let you healthcare team know if you have any known allergies when you are admitted to the hospital.    One significant side effect of nearly all antibiotics is the risk of severe and sometimes deadly diarrhea caused by Clostridium difficile (C. Difficile). This occurs when a person takes antibiotics because some good germs are destroyed. Antibiotic use allows C. diificile to take over, putting patients at high risk for this serious infection.    As a patient or caregiver, it is important to understand your or your loved one's antibiotic treatment. It is especially important for caregivers to speak up when patients can't speak for themselves. Here are some important questions to ask your healthcare team.    What infection is this antibiotic treating and how do you know I have that infection?    What side effects might occur from this antibiotic?    How long will I need to take this antibiotic?    Is it safe to take this antibiotic with other medications or supplements (e.g., vitamins) that I am taking?     Are there any special directions I need to know about taking this antibiotic? For example, should I take it with food?    How will I be monitored to know whether my infection is responding to the antibiotic?    What tests may help to make sure the right antibiotic is prescribed for me?      Information provided by:  www.cdc.gov/getsmart  U.S. Department of Health and Human Services  Centers for disease Control and Prevention  National Center for Emerging and Zoonotic Infectious Diseases  Division of Healthcare Quality Promotion              Medication List: This is a list of all your medications and when to take them. Check marks below indicate your daily home schedule. Keep this list as a reference.      Medications           Morning Afternoon Evening Bedtime As Needed    acetaminophen 325 MG tablet   Commonly known as:  TYLENOL   Take 2 tablets (650 mg) by mouth every 4 hours as needed for mild pain, fever or headaches                                   apixaban ANTICOAGULANT 5 MG tablet   Commonly known as:  ELIQUIS   Take 1 tablet (5 mg) by mouth 2 times daily   Last time this was given:  5 mg on 3/13/2018  8:15 AM                                      ascorbic acid 500 MG Tabs   Take 1 tablet (500 mg) by mouth daily   Last time this was given:  500 mg on 3/13/2018  1:48 PM                                   atenolol 25 MG tablet   Commonly known as:  TENORMIN   Take 1 tablet (25 mg) by mouth 2 times daily   Last time this was given:  25 mg on 3/13/2018  8:15 AM                                      calcium polycarbophil 625 MG tablet   Commonly known as:  FIBERCON   Take 1 tablet by mouth 2 times daily   Last time this was given:  625 mg on 3/13/2018  1:47 PM                                      calcium-vitamin D 600-400 MG-UNIT per tablet   Commonly known as:  CALTRATE   Take 2 tablets by mouth every morning   Last time this was given:  2 tablets on 3/13/2018  1:47 PM                                   ciprofloxacin 500 MG tablet   Commonly known as:  CIPRO   Take 1 tablet (500 mg) by mouth 2 times daily for 10 days   Last time this was given:  500 mg on 3/13/2018  8:16 AM                                      colchicine 0.6 MG tablet   Take 1 tablet (0.6 mg) by mouth daily   Start taking on:  3/14/2018   Last time this was given:  0.6 mg on 3/13/2018  8:15 AM                                   gabapentin 100 MG capsule   Commonly known as:  NEURONTIN   Take 2 capsules (200 mg) by mouth 3 times daily   Last time this was given:  200 mg on 3/13/2018   1:48 PM                                         HYDROCORTISONE PO   Take 100 mg by mouth IV                                hydroxychloroquine 200 MG tablet   Commonly known as:  PLAQUENIL   Take 1 tablet (200 mg) by mouth 2 times daily   Last time this was given:  200 mg on 3/13/2018  8:16 AM                                      multivitamin, therapeutic with minerals Tabs tablet   Take 1 tablet by mouth daily   Last time this was given:  1 tablet on 3/13/2018  1:48 PM                                   order for DME   Equipment being ordered: BP cuff                                predniSONE 5 MG tablet   Commonly known as:  DELTASONE   Take 1 tablet (5 mg) by mouth daily   Last time this was given:  5 mg on 3/13/2018  8:16 AM                                   spironolactone 25 MG tablet   Commonly known as:  ALDACTONE   Take 1 tablet (25 mg) by mouth daily   Start taking on:  3/14/2018   Last time this was given:  25 mg on 3/13/2018  8:16 AM                                   tolterodine 4 MG 24 hr capsule   Commonly known as:  DETROL LA   Last time this was given:  4 mg on 3/13/2018  8:16 AM

## 2018-03-07 NOTE — MR AVS SNAPSHOT
After Visit Summary   3/7/2018    Amelia Michel    MRN: [id number]           Patient Information     Date Of Birth          1960        Visit Information        Provider Department      3/7/2018 11:00 AM Gala Stringer PA-C [facility]        Today's Diagnoses     Paroxysmal atrial fibrillation (H)    -  1    MONTALVO (dyspnea on exertion)        Essential hypertension        Primary pulmonary hypertension (H)        Rheumatoid arthritis involving multiple sites with positive rheumatoid factor (H)        Atrial tachycardia (H)        Acute deep vein thrombosis (DVT) of right upper extremity, unspecified vein (H)        Lymphedema of both lower extremities        Diffuse large B-cell lymphoma of extranodal site (H)           Follow-ups after your visit        Your next 10 appointments already scheduled     Mar 27, 2018 11:30 AM CDT   Cancer Rehab Treatment with Ana Loera PT   [facility] ([facility])    [address] [place] [address]. [facility]  [address] [id number]  [facility] [address]   [phone]            Mar 29, 2018  4:30 PM CDT   (Arrive by 4:15 PM)   NEW HAND with Kevin Sheldon MD   [facility] ([facility] [place])    [address]  4th Floor  [facility] [address] [phone]            Mar 30, 2018 10:00 AM CDT   (Arrive by 9:45 AM)   Return Visit with Pj Cunha MD   [facility] ([facility])    [address] [place] [address] Se  [address]  [facility] [address] [phone]            Apr 03, 2018 11:30 AM CDT   Cancer Rehab Treatment with Ana Loera PT   [facility] ([facility])    [address] [place] [address]. [facility]  1st Floor  [address] [address]   [phone]            Apr 06, 2018  9:30 AM CDT   (Arrive by 9:15 AM)   MR NANI SANDHU with  UUMR4, UU CV MR NURSE   [facility] [place] ([facility] [place])    [address] [address] [phone]           Take your medicines as usual, unless your doctor tells you not to. Bring a list of your current medicines to your exam (including vitamins, minerals and over-the-counter drugs).  You may or may not receive intravenous (IV) contrast for this exam pending the discretion of the Radiologist.  You do not need to do anything special to prepare.  The MRI machine uses a strong magnet. Please wear clothes without metal (snaps, zippers). A sweatsuit works well, or we may give you a hospital gown.  Please remove any body piercings and hair extensions before you arrive. You will also remove watches, jewelry, hairpins, wallets, dentures, partial dental plates and hearing aids. You may wear contact lenses, and you may be able to wear your rings. We have a safe place to keep your personal items, but it is safer to leave them at home.  **IMPORTANT** THE INSTRUCTIONS BELOW ARE ONLY FOR THOSE PATIENTS WHO HAVE BEEN PRESCRIBED SEDATION OR GENERAL ANESTHESIA DURING THEIR MRI PROCEDURE:  IF YOUR DOCTOR PRESCRIBED ORAL SEDATION (take medicine to help you relax during your exam):   You must get the medicine from your doctor (oral medication) before you arrive. Bring the medicine to the exam. Do not take it at home. You ll be told when to take it upon arriving for your exam.   Arrive one hour early. Bring someone who can take you home after the test. Your medicine will make you sleepy. After the exam, you may not drive, take a bus or take a taxi by yourself.  IF YOUR DOCTOR PRESCRIBED IV SEDATION:   Arrive one hour early. Bring someone who can take you home after the test. Your medicine will make you sleepy. After the exam, you may not drive, take a bus or take a taxi by yourself.   No eating 6 hours before your exam. You may have clear liquids up until 4  hours before your exam. (Clear liquids include water, clear tea, black coffee and fruit juice without pulp.)  IF YOUR DOCTOR PRESCRIBED ANESTHESIA (be asleep for your exam):   Arrive 1 1/2 hours early. Bring someone who can take you home after the test. You may not drive, take a bus or take a taxi by yourself.   No eating 8 hours before your exam. You may have clear liquids up until 4 hours before your exam. (Clear liquids include water, clear tea, black coffee and fruit juice without pulp.)   You will spend four to five hours in the recovery room.  Please call the Imaging Department at your exam site with any questions.            Apr 10, 2018 12:45 PM CDT   Cancer Rehab Treatment with Ana Loera PT   [facility] ([facility])    [facility] [address]  1st [address]  [address]  [facility] [address]   [phone]            Apr 11, 2018 10:00 AM CDT   (Arrive by 9:45 AM)   RETURN ARRHYTHMIA with Juan Eaton MD   [facility] ([facility] [place])    [address]  [address]  [facility] [address]   [phone]            May 24, 2018 12:00 PM CDT   (Arrive by 11:45 AM)   CT CHEST ABDOMEN PELVIS W/O & W CONTRAST with UCCT1   [facility] CT ([facility] [place])    [address] [address]   [phone]           Please bring any scans or X-rays taken at other hospitals, if similar tests were done. Also bring a list of your medicines, including vitamins, minerals and over-the-counter drugs. It is safest to leave personal items at home.  Be sure to tell your doctor:   If you have any allergies.   If there s any chance you are pregnant.   If you are breastfeeding.  You may have contrast for this exam. To prepare:   Do not eat or drink for 2 hours before your exam. If you need to take medicine, you may take it with small sips of water. (We may ask  you to take liquid medicine as well.)   The day before your exam, drink extra fluids at least six 8-ounce glasses (unless your doctor tells you to restrict your fluids).   You will be given instructions on how to drink the contrast.  Patients over 70 or patients with diabetes or kidney problems:   If you haven t had a blood test (creatinine test) within the last 30 days, the Cardiologist/Radiologist may require you to get this test prior to your exam.  If you have diabetes:   Continue to take your metformin medication on the day of your exam  Please wear loose clothing, such as a sweat suit or jogging clothes. Avoid snaps, zippers and other metal. We may ask you to undress and put on a hospital gown.  If you have any questions, please call the Imaging Department where you will have your exam.            May 24, 2018  3:00 PM CDT   [email or web address] Lab Draw with  [facility] LAB DRAW   [facility] ([facility])    [address]  [address]  [facility] [address] [phone]              Who to contact     Normal or non-critical lab and imaging results will be communicated to you by [facility], letter or phone within 4 business days after the clinic has received the results. If you do not hear from us within 7 days, please contact the clinic through I[facility] EXIMt or phone. If you have a critical or abnormal lab result, we will notify you by phone as soon as possible.  Submit refill requests through [facility] or call your pharmacy and they will forward the refill request to us. Please allow 3 business days for your refill to be completed.          If you need to speak with a  for additional information , please call: [phone]             Additional Information About Your Visit        [facility] Information     [facility] gives you secure access to your electronic health record. If you see a primary care provider, you can also send messages to your care team and make  appointments. If you have questions, please call your primary care clinic.  If you do not have a primary care provider, please call [phone] and they will assist you.        Care EveryWhere ID     This is your Care EveryWhere ID. This could be used by other organizations to access your [facility] records  [id number]        Your Vitals Were     Pulse Temperature Pulse Oximetry BMI (Body Mass Index)          55 97.5  F (36.4  C) (Tympanic) 99% 27.17 kg/m2         Blood Pressure from Last 3 Encounters:   03/23/18 125/90   03/23/18 (!) 89/65   03/23/18 100/74    Weight from Last 3 Encounters:   03/23/18 123 lb 11.2 oz (56.1 kg)   03/13/18 118 lb (53.5 kg)   03/07/18 130 lb (59 kg)              We Performed the Following     EKG 12-lead complete w/read - Clinics          Today's Medication Changes          These changes are accurate as of 3/7/18  3:02 PM.  If you have any questions, ask your nurse or doctor.               Stop taking these medicines if you haven't already. Please contact your care team if you have questions.     mirabegron 50 MG 24 hr tablet   Commonly known as:  MYRBETRIQ   Stopped by:  Gala Stringer PA-C                    Primary Care Provider Office Phone # Fax #    Comfort Sabillon [phone] [address] [facility] [address]        Equal Access to Services     [facility]: Hadii laurita mcguire hadasho Sooliviaali, waaxda luqadaha, qaybta kaalmada catalino, durga inman. So [facility] [phone].    ATENCIÓN: Si habla español, tiene a sarmiento disposición servicios gratuitos de asistencia lingüística. Llame al [phone].    We comply with applicable federal civil rights laws and [place] laws. We do not discriminate on the basis of race, color, national origin, age, disability, sex, sexual orientation, or gender identity.            Thank you!     Thank you for choosing [facility]  for your care. Our goal is always to provide you  with excellent care. Hearing back from our patients is one way we can continue to improve our services. Please take a few minutes to complete the written survey that you may receive in the mail after your visit with us. Thank you!             Your Updated Medication List - Protect others around you: Learn how to safely use, store and throw away your medicines at www.disposemymeds.org.          This list is accurate as of 3/7/18  3:02 PM.  Always use your most recent med list.                   Brand Name Dispense Instructions for use Diagnosis    acetaminophen 325 MG tablet    TYLENOL     Take 2 tablets (650 mg) by mouth every 4 hours as needed for mild pain, fever or headaches    Diffuse large B-cell lymphoma of extranodal site (H)       apixaban ANTICOAGULANT 5 MG tablet    ELIQUIS    180 tablet    Take 1 tablet (5 mg) by mouth 2 times daily    Paroxysmal atrial fibrillation (H)       ascorbic acid 500 MG Tabs     30 tablet    Take 1 tablet (500 mg) by mouth daily    Diffuse large B-cell lymphoma, unspecified body region (H)       atenolol 25 MG tablet    TENORMIN    60 tablet    Take 1 tablet (25 mg) by mouth 2 times daily    Atrial tachycardia (H)       calcium polycarbophil 625 MG tablet    FIBERCON     Take 1 tablet by mouth 2 times daily        calcium-vitamin D 600-400 MG-UNIT per tablet    CALTRATE    60 tablet    Take 2 tablets by mouth every morning    Diffuse large B-cell lymphoma, unspecified body region (H)       ciprofloxacin 500 MG tablet    CIPRO    20 tablet    Take 1 tablet (500 mg) by mouth 2 times daily for 10 days    Dysuria       colchicine 0.6 MG tablet     30 tablet    Take 1 tablet (0.6 mg) by mouth daily    Acute pericarditis, unspecified type       gabapentin 100 MG capsule    NEURONTIN    180 capsule    Take 2 capsules (200 mg) by mouth 3 times daily    Critical illness myopathy       HYDROCORTISONE PO      Take 100 mg by mouth IV        * hydroxychloroquine 200 MG tablet    PLAQUENIL     60 tablet    Take 1 tablet (200 mg) by mouth 2 times daily    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 (H)       * hydroxychloroquine 200 MG tablet    PLAQUENIL    60 tablet    Take 1 tablet (200 mg) by mouth 2 times daily    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 (H)       multivitamin, therapeutic with minerals Tabs tablet     30 each    Take 1 tablet by mouth daily    Diffuse large B-cell lymphoma, unspecified body region (H)       order for DME     1 Device    Equipment being ordered: BP cuff    Hypertension, unspecified type       potassium chloride 20 MEQ Packet    KLOR-CON    90 packet    Take 20 mEq by mouth daily    Hypokalemia       predniSONE 5 MG tablet    DELTASONE    90 tablet    Take 1 tablet (5 mg) by mouth daily    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 (H)       spironolactone 25 MG tablet    ALDACTONE    30 tablet    Take 1 tablet (25 mg) by mouth daily    Acute on chronic diastolic heart failure (H)       tolterodine 4 MG 24 hr capsule    DETROL LA     Take 4 mg by mouth daily        * Notice:  This list has 2 medication(s) that are the same as other medications prescribed for you. Read the directions carefully, and ask your doctor or other care provider to review them with you.

## 2018-03-07 NOTE — H&P
[facility], [place]    Cardiology History and Physical - Cards 1       Date of Admission:  3/7/2018    Chief Complaint   Shortness of breath    History is obtained from the patient and chart review.     History of Present Illness   Amelia Michel is a 57 year old female with Afib (diagnosed in May 2017 with cardiac arrest), DLBCL (in remission since Dec), RA, h/o VSD repair who presents today with shortness of breath with exertion and palpable splenomegaly.     She has been chronically ill since last May with her cardiac arrest and cancer diagnosis but had been doing well at home until 5 days ago when she noticed palpitations. She presented to ED that night where she was in Afib with RVR to 180s. Given 5mg IV metoprolol x 2 and cardioverted successfully with 100J x1. She was scheduled to follow-up with PCP today. At her PCP visit she reported a 5 lb weight gain, shortness of breath with exertion, lightheadedness with standing and bradycardia with exertion. ROS notable for currently being treated with cipro for bacteruria and some associated diarrhea. She has also noticed upper abdomen protrusion which is new for her. Endorses some mild increase in leg swelling.     No orthopnea, PNA, cough, syncope, dysuria, constipation.     Review of Systems   The 10 point Review of Systems is negative other than noted in the HPI.    Past Medical History    Paroxysmal atrial fibrillation  Stage IV B Diffuse large B cell lymphoma, high risk double hit    Dx on BMBx 7/2017   PET 7/2017: rt paratracheal lesions, many liver lesions, cardiac lesion intraarterial septum, numberous bone lesions   Neg LP 8/23/2017   S/P 1 R-EPOCH and 5 R-CHOP    Reaction to high dose MTX so IT chemo for ppx   PET CT 10/2017 Resolution of DLBCL   PET CT 1/2018 No evidence of lymphoma  H/O Cardiac arrest in May 2017  Neuropathy assoc with chemo/critical illness  HTN  RA  H/O PICC associated DVT    Past Surgical History     May  2017 Cardioversion  July 2017 BMBx  1969  VSD repair    Social History   , no children  Lives in [place]  Worked at [employer] prior to DLBCL diagnosis  Enjoys cooking and crossword puzzles  Occasional 1 glass wine  Lifelong non-smoker  Denies illicits    Family History   Father and mother both had malignancies  HTN    Prior to Admission Medications   Prior to Admission Medications   Prescriptions Last Dose Informant Patient Reported? Taking?   HYDROCORTISONE PO   Yes No   Sig: Take 100 mg by mouth IV   acetaminophen (TYLENOL) 325 MG tablet   No No   Sig: Take 2 tablets (650 mg) by mouth every 4 hours as needed for mild pain, fever or headaches   apixaban ANTICOAGULANT (ELIQUIS) 5 MG tablet   No No   Sig: Take 1 tablet (5 mg) by mouth 2 times daily   ascorbic acid 500 MG TABS   No No   Sig: Take 1 tablet (500 mg) by mouth daily   atenolol (TENORMIN) 25 MG tablet   No No   Sig: Take 1 tablet (25 mg) by mouth 2 times daily   calcium polycarbophil (FIBERCON) 625 MG tablet   Yes No   Sig: Take 1 tablet by mouth 2 times daily   calcium-vitamin D (CALTRATE) 600-400 MG-UNIT per tablet   No No   Sig: Take 2 tablets by mouth every morning   ciprofloxacin (CIPRO) 500 MG tablet   No No   Sig: Take 1 tablet (500 mg) by mouth 2 times daily for 10 days   gabapentin (NEURONTIN) 100 MG capsule   No No   Sig: Take 2 capsules (200 mg) by mouth 3 times daily   hydroxychloroquine (PLAQUENIL) 200 MG tablet   No No   Sig: Take 1 tablet (200 mg) by mouth 2 times daily   multivitamin, therapeutic with minerals (THERA-VIT-M) TABS tablet   No No   Sig: Take 1 tablet by mouth daily   order for DME   No No   Sig: Equipment being ordered: BP cuff   potassium chloride (KLOR-CON) 20 MEQ Packet   No No   Sig: Take 20 mEq by mouth daily   predniSONE (DELTASONE) 5 MG tablet   No No   Sig: Take 1 tablet (5 mg) by mouth daily   tolterodine (DETROL LA) 4 MG 24 hr capsule   Yes No      Facility-Administered Medications: None     Allergies    Allergies   Allergen Reactions     Blood Transfusion Related (Informational Only) Hives     Hives with platelets. Give benadryl premedication.     Metoprolol Other (See Comments)     Pt and  report that metoprolol does not work for her and also reports feeling unwell with this medication. She has been able to tolerate atenolol, which as worked in controlling her HR.      No Clinical Screening - See Comments      Penicillin Allergy Skin Test not performed, see antimicrobial management team progress note 7/5/17.       Penicillins      Tape [Adhesive Tape] Rash       Physical Exam   Vital Signs:                    Weight: 0 lbs 0 oz    General Appearance: chronically ill appearing but energetic, NAD, great historian  Respiratory: CTAB, absent breath sounds in the right lower lung fields above the liver.   Cardiovascular: systolic murmur best heart at the left sternal border, no pedal edema, no JVD, ext WWP  GI: hepatomegaly, palpable across the epigastrum and 6 cm below the costal margin, NABS  Skin: no rashes, jaundice  Musculoskeletal: no joint swelling, warmth, erythema  Neurologic: AAOx3, nonfocal  Psychiatric: pleasant, mood and affect congruent    Data:   Plt 88 (at baseline)  Na 129    Alk phos/ALT/AST: 178/232/150  INR 3.42    ECHO 9/2017  Left ventricular function, chamber size, wall motion, and wall thickness are  normal.The EF is 60-65% (traced 60%.) GLS -18%.  The right ventricle is normal size and normal in function. The RV is mildly  dilated.  Moderate tricuspid insufficiency is present.  Mild pulmonary hypertension is present (esimated PASP 55 mmHg including RA  pressure.)  Dilation of the inferior vena cava is present with abnormal respiratory  variation in diameter (esitimated RAP 15 mmHg.)  This study was compared with the study from 8/31/17: TR appears slightly  decreased from the earlier study.    Assessment & Plan   Amelia Michel is a 57 year old female with DLBCL in remissions  (presented as afib and cardiac arrest in May 2017) admitted on 3/7/2018 for hepatomegally, dyspnea, and bradycardia with exertion. She did have involement of the heart with her lymphoma but had been in remission since PET scan on Oct and repeat in Jan. The concern today is that her symptoms may be related to a recurrence.     # Afib with RVR. Cardioverted successfully on March 2. Currently NSR and bradycardic.   - Walk on telemetry to eval for bradycardia  - Cardiac MRI in AM to eval for recurrence  - orthostatic vitals  - Continue atenolol 25 mg qday, consider decreasing and trialing walk test off beta blocker  - Continue eliquis    # Hepatomegaly with elevated LFTs  Did have involement of the liver with lympoma. Could be related to recurrence, less likely but also possible congesion.   - RUQ US with dopplers  - CMP in AM, monitor hyponatremia    # Upper ext wound. , chronic  - WOC consult    # Asymptomatic bacteruria. Started course of cipro 2 days ago. Will continue    Chronic conditions (PTA meds unless otherwise specified)    # RA - plaquenil 200 mg qday, prednisone 5 mg qday  # Pain - gabapentin 200 mg qday, APAP    Diet: NPO for Medical/Clinical Reasons Except for: MedsVitC, fibercon, Calcium-Vit D, MVI continued  DVT Prophylaxis: Ambulate every shift  Code Status: Full Code    Disposition Plan   Expected discharge: 2 - 3 days; recommended to prior living arrangement once cardiac arrhythmia controlled and workup of hepatomegally completed.      Entered: Berenice Thomason 03/07/2018, 3:25 PM   Information in the above section will display in the discharge planner report.    The patient was discussed with Dr. Angelito Thomason  Internal Medicine PGY3  [facility]   Pager: [phone]  Please see sticky note for cross cover information    CARDIOLOGY STAFF NOTE  I have seen and examined the patient with the Cards 1 team. I agree with the note above that reflects my  assessment and plan of care. Patient seen 3/7/18.  Juan Eaton MD [facility]  Cardiology - Electrophysiology

## 2018-03-07 NOTE — IP AVS SNAPSHOT
[address] [address]    Phone:  [phone]                                       After Visit Summary   3/7/2018    Amelia Michel    MRN: [id number]           After Visit Summary Signature Page     I have received my discharge instructions, and my questions have been answered. I have discussed any challenges I see with this plan with the nurse or doctor.    ..........................................................................................................................................  Patient/Patient Representative Signature      ..........................................................................................................................................  Patient Representative Print Name and Relationship to Patient    ..................................................               ................................................  Date                                            Time    ..........................................................................................................................................  Reviewed by Signature/Title    ...................................................              ..............................................  Date                                                            Time

## 2018-03-07 NOTE — PROGRESS NOTES
"SUBJECTIVE:                                                    Amelia Michel is a 57 year old female who presents to clinic today for the following health issues:    ED/UC Followup:    Facility:  [facility]  Date of visit: 3/2/2018  Reason for visit: Heart Palpitations  Current Status: She is having shortness of breath, other than that she is feeling better. States she has been having dizziness more than normal.  She is currently being treated for UTI with Cipro     Has been on ellequis. Has been on and off because of chemo and platelet count. Has been on this steadily since January  She stopped chemo in December. Last PET scan January - clean. She is in remission. Sees oncology in May  DVT in right arm from PICC line in September after high dose methotrexate. She was not on ellequis at this point.  Rheumatoid arthritis: it flares quite a bit in the winter. She takes plaquenil and prednisone 5 mg daily.  Urology dysfunction: bladder infection now. Detrol. Did not start Myrbetriq as it can raise your blood pressure   - urine culture shows cipro works. She never had symptoms of this. She had dry heaves after cipro. But today tolerated it okay.  - maybe contributing to dizziness?  Has had murmur since childhood    The dizziness started before ED visit. If she bends down below knees and comes up, a little spinning.  No associated symptoms. She wonders about dehydration. On and off diarrhea, was nauseous    Has a wound in  Left inner arm since May - Chronic nonhealing left medial elbow wound as a result of IV extravasation nearly a year ago.. Saw plastics 2/13/18  Has appointment tomorrow   Has not been on antibiotics, has not been considered infectious    She has noticed chills since she had bladder infection but no fevers. \"intolerance to cold\"  Not eating that much due to nausea on antibiotics     Patient denies: CP, abdominal pain, N/V, HA, diaphoresis, peripheral edema, visual changes, heart palpitations "   new MONTALVO - doesn't take much. For example she felt dyspneic walking care home down our [address]. She avoids stairs.    Weight up 9 lb since 3/2/18. Per  up 5 lb since Friday  Not fluid in legs.she notices Fluid in belly    pulm hypertension diagnosed in 1990s - per patient ruled out in hospital this summer with right heart study/wedge pressure  However noted on echocardiogram this fall    Echocardiogram 9/17/17  Interpretation Summary  Left ventricular function, chamber size, wall motion, and wall thickness are  normal.The EF is 60-65% (traced 60%.) GLS -18%.  The right ventricle is normal size and normal in function. The RV is mildly  dilated.  Moderate tricuspid insufficiency is present.  Mild pulmonary hypertension is present (esimated PASP 55 mmHg including RA  pressure.)  Dilation of the inferior vena cava is present with abnormal respiratory  variation in diameter (esitimated RAP 15 mmHg.)  This study was compared with the study from 8/31/17: TR appears slightly  decreased from the earlier study.    Problem list and histories reviewed & adjusted, as indicated.  Additional history: none    Patient Active Problem List   Diagnosis     HTN (hypertension)     Primary pulmonary hypertension (H)     Hyperlipidemia LDL goal <130     RA (rheumatoid arthritis) (H)     Lymphedema of both lower extremities     Atrial tachycardia (H)     Cardiogenic shock (H)     Acute respiratory failure with hypoxia (H)     Hypertension     Critical illness myopathy     Sepsis (H)     Wound of left upper extremity     Diffuse large B-cell lymphoma of extranodal site (H)     Diffuse large B cell lymphoma (H)     Febrile neutropenia (H)     Physical deconditioning     Health Care Home     DLBCL (diffuse large B cell lymphoma) (H)     H/O cardiac arrest     Palpitations     Past Surgical History:   Procedure Laterality Date     ANESTHESIA CARDIOVERSION N/A 5/17/2017    Procedure: ANESTHESIA CARDIOVERSION;  ANESTHESIA CARDIOVERSION;   Surgeon: GENERIC ANESTHESIA PROVIDER;  Location: [facility] OR     ARTHRODESIS WRIST  2000    Right wrist     BONE MARROW ASPIRATE &BIOPSY  7/12/2017          FOOT SURGERY      4 left and 2 right     RELEASE CARPAL TUNNEL BILATERAL       REPAIR VENTRICULAR SEPTAL DEFECT  1969       Social History   Substance Use Topics     Smoking status: Never Smoker     Smokeless tobacco: Never Used     Alcohol use Yes      Comment: Glass of wine two evenings a night     Family History   Problem Relation Age of Onset     Breast Cancer Mother      Hypertension Mother      Alzheimer Disease Mother      Hypertension Father      Blood Disease Father      Lymphoa     Circulatory Father      A Fib     DIABETES Paternal Grandmother          Labs reviewed in EPIC    ROS:  Other than noted above, general, HEENT, respiratory, cardiac, MS, and gastrointestinal systems are negative.     OBJECTIVE:                                                    /89 (BP Location: Right arm, Patient Position: Sitting, Cuff Size: Adult Regular)  Pulse 55  Temp 97.5  F (36.4  C) (Tympanic)  Wt 130 lb (59 kg)  SpO2 99%  BMI 27.17 kg/m2 Body mass index is 27.17 kg/(m^2).   GENERAL: healthy, alert, well nourished, well hydrated, no distress  RESP: POSITIVE right lower lung very diminished/absent lung sounds. Faint crackling left lower lung. Otherwise CTAB.  CV: regular rates and rhythm, normal S1 S2, no S3 or S4 and systolic murmur, no click or rub -  ABDOMEN: soft, no masses, normal bowel sounds POSITIVE hepatomegaly extending across midline. Mild generalized upper abdominal tenderness  MS: POSITIVE   1+ bilaterally edema, has compression stockings. Worse than baseline for her.  Left antecubital area shows open wound with dressing. Some erythema over adjacent mass. No spreading erythema or streaking    CXR - can see liver into rib cage. This is likely why I could not hear lung sounds  IMPRESSION:  Mild pulmonary vascular congestion. Mild left basilar  infiltrate versus focal edema.  I independently viewed the x-ray, discussed with patient, and am awaiting radiology read.     EKG - sinus bradycardia, Undetermined   Bradycardia   -  Negative T-waves  -Possible  Anterior  ischemia.    Low voltage -possible pulmonary disease. , no LVH by voltage criteria, changed from previous tracings  I personally read, reviewed, and advised patient of EKG results.     Walking patient down to x-ray, O2 stayed upper 90s, down to 95% at the end.  Her pulse however went from 50s down to 30s while walking, then up to 60 at the end at rest.     ASSESSMENT/PLAN:                                                      ASSESSMENT/PLAN:      ICD-10-CM    1. Paroxysmal atrial fibrillation (H) I48.0 EKG 12-lead complete w/read - Clinics   2. MONTALVO (dyspnea on exertion) R06.09 XR Chest 2 Views     EKG 12-lead complete w/read - Clinics   3. Essential hypertension I10    4. Primary pulmonary hypertension (H) I27.0    5. Rheumatoid arthritis involving multiple sites with positive rheumatoid factor (H) M05.79    6. Atrial tachycardia (H) I47.1    7. Acute deep vein thrombosis (DVT) of right upper extremity, unspecified vein (H) I82.621    8. Lymphedema of both lower extremities I89.0    9. Diffuse large B-cell lymphoma of extranodal site (H) C83.39      Enlarged liver (with history of shock liver and failure), new onset MONTALVO. History of pulmonary hypertension. Paroxysmal a fib with bradycardia (worse with activity) with recent cardioversion 3/2/18  Dr. Nowak spoke with cardiology at the [facility]. Able to direct admit patient to [facility].  Recommended further evaluation/workup and treatment.    Lymphoma: She stopped chemo in December. Last PET scan January - clean. She is in remission. Sees oncology in May  DVT in right arm from PICC line in September after high dose methotrexate. She was not on ellequis at this point. She is now on ellequis for blood thinning  RA: Rheumatoid arthritis:  it flares quite a bit in the winter. She takes plaquenil and prednisone 5 mg daily.    Gala Stringer PA-C   [facility]

## 2018-03-08 ENCOUNTER — APPOINTMENT (OUTPATIENT)
Dept: ULTRASOUND IMAGING | Facility: CLINIC | Age: 58
DRG: 314 | End: 2018-03-08
Attending: INTERNAL MEDICINE
Payer: COMMERCIAL

## 2018-03-08 ENCOUNTER — APPOINTMENT (OUTPATIENT)
Dept: MRI IMAGING | Facility: CLINIC | Age: 58
DRG: 314 | End: 2018-03-08
Attending: INTERNAL MEDICINE
Payer: COMMERCIAL

## 2018-03-08 ENCOUNTER — PRE VISIT (OUTPATIENT)
Dept: ORTHOPEDICS | Facility: CLINIC | Age: 58
End: 2018-03-08

## 2018-03-08 LAB
ANION GAP SERPL CALCULATED.3IONS-SCNC: 10 MMOL/L (ref 3–14)
BUN SERPL-MCNC: 18 MG/DL (ref 7–30)
CALCIUM SERPL-MCNC: 8.5 MG/DL (ref 8.5–10.1)
CHLORIDE SERPL-SCNC: 105 MMOL/L (ref 94–109)
CO2 SERPL-SCNC: 20 MMOL/L (ref 20–32)
CREAT SERPL-MCNC: 0.66 MG/DL (ref 0.52–1.04)
CRP SERPL-MCNC: 63 MG/L (ref 0–8)
ERYTHROCYTE [DISTWIDTH] IN BLOOD BY AUTOMATED COUNT: 13.4 % (ref 10–15)
ERYTHROCYTE [SEDIMENTATION RATE] IN BLOOD BY WESTERGREN METHOD: 6 MM/H (ref 0–30)
GFR SERPL CREATININE-BSD FRML MDRD: >90 ML/MIN/1.7M2
GLUCOSE SERPL-MCNC: 83 MG/DL (ref 70–99)
HCT VFR BLD AUTO: 39.7 % (ref 35–47)
HGB BLD-MCNC: 13.1 G/DL (ref 11.7–15.7)
INTERPRETATION ECG - MUSE: NORMAL
MCH RBC QN AUTO: 37.5 PG (ref 26.5–33)
MCHC RBC AUTO-ENTMCNC: 33 G/DL (ref 31.5–36.5)
MCV RBC AUTO: 114 FL (ref 78–100)
PLATELET # BLD AUTO: 87 10E9/L (ref 150–450)
POTASSIUM SERPL-SCNC: 4.2 MMOL/L (ref 3.4–5.3)
RBC # BLD AUTO: 3.49 10E12/L (ref 3.8–5.2)
SODIUM SERPL-SCNC: 135 MMOL/L (ref 133–144)
WBC # BLD AUTO: 4.3 10E9/L (ref 4–11)

## 2018-03-08 PROCEDURE — 76700 US EXAM ABDOM COMPLETE: CPT | Mod: XS

## 2018-03-08 PROCEDURE — 85652 RBC SED RATE AUTOMATED: CPT | Performed by: INTERNAL MEDICINE

## 2018-03-08 PROCEDURE — 25000132 ZZH RX MED GY IP 250 OP 250 PS 637: Performed by: INTERNAL MEDICINE

## 2018-03-08 PROCEDURE — 36415 COLL VENOUS BLD VENIPUNCTURE: CPT | Performed by: INTERNAL MEDICINE

## 2018-03-08 PROCEDURE — 21400006 ZZH R&B CCU INTERMEDIATE UMMC

## 2018-03-08 PROCEDURE — 25000125 ZZHC RX 250: Performed by: INTERNAL MEDICINE

## 2018-03-08 PROCEDURE — G0463 HOSPITAL OUTPT CLINIC VISIT: HCPCS

## 2018-03-08 PROCEDURE — 86140 C-REACTIVE PROTEIN: CPT | Performed by: INTERNAL MEDICINE

## 2018-03-08 PROCEDURE — 80048 BASIC METABOLIC PNL TOTAL CA: CPT | Performed by: INTERNAL MEDICINE

## 2018-03-08 PROCEDURE — 75561 CARDIAC MRI FOR MORPH W/DYE: CPT

## 2018-03-08 PROCEDURE — 25000128 H RX IP 250 OP 636: Performed by: INTERNAL MEDICINE

## 2018-03-08 PROCEDURE — 85027 COMPLETE CBC AUTOMATED: CPT | Performed by: INTERNAL MEDICINE

## 2018-03-08 PROCEDURE — 75561 CARDIAC MRI FOR MORPH W/DYE: CPT | Mod: 26 | Performed by: INTERNAL MEDICINE

## 2018-03-08 PROCEDURE — 99232 SBSQ HOSP IP/OBS MODERATE 35: CPT | Mod: 25 | Performed by: INTERNAL MEDICINE

## 2018-03-08 PROCEDURE — A9585 GADOBUTROL INJECTION: HCPCS | Performed by: INTERNAL MEDICINE

## 2018-03-08 RX ORDER — FUROSEMIDE 10 MG/ML
20 INJECTION INTRAMUSCULAR; INTRAVENOUS ONCE
Status: COMPLETED | OUTPATIENT
Start: 2018-03-08 | End: 2018-03-08

## 2018-03-08 RX ORDER — GADOBUTROL 604.72 MG/ML
7.5 INJECTION INTRAVENOUS ONCE
Status: COMPLETED | OUTPATIENT
Start: 2018-03-08 | End: 2018-03-08

## 2018-03-08 RX ADMIN — OXYCODONE HYDROCHLORIDE AND ACETAMINOPHEN 500 MG: 500 TABLET ORAL at 15:42

## 2018-03-08 RX ADMIN — GABAPENTIN 200 MG: 100 CAPSULE ORAL at 09:23

## 2018-03-08 RX ADMIN — GABAPENTIN 200 MG: 100 CAPSULE ORAL at 14:22

## 2018-03-08 RX ADMIN — CIPROFLOXACIN HYDROCHLORIDE 500 MG: 500 TABLET, FILM COATED ORAL at 20:09

## 2018-03-08 RX ADMIN — APIXABAN 5 MG: 5 TABLET, FILM COATED ORAL at 09:23

## 2018-03-08 RX ADMIN — APIXABAN 5 MG: 5 TABLET, FILM COATED ORAL at 20:09

## 2018-03-08 RX ADMIN — TOLTERODINE 4 MG: 4 CAPSULE, EXTENDED RELEASE ORAL at 09:21

## 2018-03-08 RX ADMIN — Medication 2 TABLET: at 15:38

## 2018-03-08 RX ADMIN — GABAPENTIN 200 MG: 100 CAPSULE ORAL at 20:09

## 2018-03-08 RX ADMIN — CALCIUM POLYCARBOPHIL 625 MG: 625 TABLET, FILM COATED ORAL at 15:43

## 2018-03-08 RX ADMIN — PREDNISONE 5 MG: 5 TABLET ORAL at 09:27

## 2018-03-08 RX ADMIN — ATENOLOL 25 MG: 25 TABLET ORAL at 09:25

## 2018-03-08 RX ADMIN — FUROSEMIDE 20 MG: 10 INJECTION, SOLUTION INTRAVENOUS at 16:17

## 2018-03-08 RX ADMIN — GADOBUTROL 7.5 ML: 604.72 INJECTION INTRAVENOUS at 11:45

## 2018-03-08 RX ADMIN — CIPROFLOXACIN HYDROCHLORIDE 500 MG: 500 TABLET, FILM COATED ORAL at 09:23

## 2018-03-08 RX ADMIN — MULTIPLE VITAMINS W/ MINERALS TAB 1 TABLET: TAB at 15:44

## 2018-03-08 RX ADMIN — HYDROXYCHLOROQUINE SULFATE 200 MG: 200 TABLET, FILM COATED ORAL at 09:26

## 2018-03-08 RX ADMIN — POTASSIUM CHLORIDE 20 MEQ: 1.5 POWDER, FOR SOLUTION ORAL at 09:36

## 2018-03-08 RX ADMIN — HYDROXYCHLOROQUINE SULFATE 200 MG: 200 TABLET, FILM COATED ORAL at 20:09

## 2018-03-08 NOTE — PROGRESS NOTES
[facility] Nurse Inpatient Wound Assessment     Initial  Assessment  Reason for consultation: Evaluate and treat L upper extrem wound     Assessment  L UE wound due to Trauma (extravasation)  Status: initial assessment this admit    Treatment Plan  L upper arm: Pt may complete own wound care.  Daily cleanse with scrub care using cotton tipped applicator, rinse with saline.  Cut small piece of Promogran (#[id number]) and place into wound.  Cover with dry 2 x 2 guaze and secure with tubigrip.    Orders Written  [facility] Nurse follow-up plan:weekly  Nursing to notify the Provider(s) and re-consult the [facility] Nurse if wound(s) deteriorates or new skin concern.    Patient History  According to provider note(s):  Amelia Michel is a 57 year old female with Afib (diagnosed in May 2017 with cardiac arrest), DLBCL (in remission since Dec), RA, h/o VSD repair who presents today with shortness of breath with exertion and palpable splenomegaly.     Objective Data  Containment of urine/stool: Continent of bowel and Continent of bladder    Active Diet Order    Active Diet Order      Low Saturated Fat Na <2400 mg    Output:   I/O last 3 completed shifts:  In: 360 [P.O.:360]  Out: 1275 [Urine:1275]    Risk Assessment:    Jay Jay Score  Av  Min: 20  Max: 20                            Labs:   Recent Labs  Lab 18  1621   HGB 13.3   INR 3.42*   WBC 4.8           Recent Labs  Lab 18  0930   CULT 10,000 to 50,000 colonies/mLProteus mirabilis*       Physical Exam  Skin assessment:  Focused skin inspection:  Left upper arm    Wound Location:  Left upper arm    3/8/18  Wound History: Pt with known extravasation 2017, non healing.  Seen by Dr. Sheldon (plastics) with MRI and biopsy 2018.  Was to have follow up appt today with results.  Per pt current POC is every other day dressing changes, but she has been doing it daily 2/2 increase in drainage.    Measurements (length x width x depth, in cm) 1.4 x 0.4 x 0.3 cm  Wound Base:  Mixed  yellow and red base.    Palpation of the wound bed: firm   Periwound skin: intact, indurated and firm scar formation  Color: pink  Temperature: normal   Drainage:, small  Description of drainage: yellow  Odor: none  Pain: denies     Interventions  Current support surface: Standard  Atmos Air mattress    Current off-loading measures: Pillows under calves  Visual inspection of wound(s) completed  Wound Care: done per plan of care    Supplies: ordered: promogran  Education provided today: wound care    Discussed plan of care with Patient and Nurse    Face to face time: 20 minutes      Theresa Reeves RN

## 2018-03-08 NOTE — PLAN OF CARE
Problem: Patient Care Overview  Goal: Plan of Care/Patient Progress Review  Pt A&Ox4, VSS, SB/SR, afebrile, denies pain.  Pt scheduled NPO at midnight for MRI & Abd US tomorrow.  Pt has been given the MRI checklist.  Pt needs orthostatic BP's done tonight as well.  Pt will be consulted with Heme Onc, WOC nursing & SW tomorrow.  Pt Admission documents still need to be completed.  Continue to monitor and contact Cards 1 with concerns.

## 2018-03-08 NOTE — PROGRESS NOTES
[facility] Cardiology Progress Note          Assessment and Plan:   Assessment: 56yo F with PMH of DLBCL in remission (last PET scan 1/15/2018), RA, atrial tachyarrythmias, history of Afib and prior cardiac arrest (which eventually led to her diagnosis of DLBCL) who presents with shortness of breath on exertion, lower extremity edema, and abdominal distension in the setting of recent lethargy and UTI    Plan:  #HFpEF exacerbation  Last echo 9/2017 with EF 60-65%, dilated IVC. Presented with one week of lethargy, poor appetite, nausea/vomiting which then led to 1-2 days of shortness of breath, lower extremity edema, and abdominal distension. Presented to ED on 3/2 in Afib with RVR, s/p cardioversion.  -- diuresis with 20mg IV lasix today  -- cardiac MRI pending    #Elevated transaminases, hepatomegaly  RUQ ultrasound consistent with congestion  --trend liver enzymes    #Hypervolemic hyponatremia  -- continue to trend    #Recent Afib with RVR s/p cardioversion  Sinus rhythm/bradycardia. Some concern regarding bradycardia with exertion  -- continue atenolol, consider decreasing dose    #Recent UTI  Complete cipro course    #RA: continue PTA plaquenil, prednisone  #Pain: continue gabapentin, APAP    FEN: cardiac diet  Prophylaxis: ambulate  Code StatuS: Full  Disposition: 1-2 days    Patient seen and discussed with Dr. Angelito Roque MD  Internal Medicine PGY-3  [phone]    CARDIOLOGY STAFF NOTE  I have seen and examined the patient with the Cards 1 team. I agree with the note above that reflects my assessment and plan of care.  Juan Eaton MD [facility]  Cardiology - Electrophysiology          Interval History:   No events overnight. Feeling slightly better this morning. Swelling has improved. Ambulated around unit, HR 70-80 upon return. No fevers, chills. Still has some abdominal bloating.         Medications:     Current Facility-Administered Medications   Medication     naloxone (NARCAN)  injection 0.1-0.4 mg     melatonin tablet 1 mg     potassium chloride SA (K-DUR/KLOR-CON M) CR tablet 20-40 mEq     potassium chloride (KLOR-CON) Packet 20-40 mEq     potassium chloride 10 mEq in 100 mL sterile water intermittent infusion (premix)     potassium chloride 10 mEq in 100 mL intermittent infusion with 10 mg lidocaine     potassium chloride 20 mEq in 50 mL intermittent infusion     magnesium sulfate 2 g in NS intermittent infusion (PharMEDium or [facility] Cmpd)     magnesium sulfate 4 g in 100 mL sterile water (premade)     senna-docusate (SENOKOT-S;PERICOLACE) 8.6-50 MG per tablet 1 tablet    Or     senna-docusate (SENOKOT-S;PERICOLACE) 8.6-50 MG per tablet 2 tablet     acetaminophen (TYLENOL) tablet 650 mg     apixaban ANTICOAGULANT (ELIQUIS) tablet 5 mg     ascorbic acid (VITAMIN C) tablet 500 mg     atenolol (TENORMIN) tablet 25 mg     calcium polycarbophil (FIBERCON) tablet 625 mg     calcium-vitamin D (CALTRATE) 600-400 MG-UNIT per tablet 2 tablet     ciprofloxacin (CIPRO) tablet 500 mg     gabapentin (NEURONTIN) capsule 200 mg     hydroxychloroquine (PLAQUENIL) tablet 200 mg     multivitamin, therapeutic with minerals (THERA-VIT-M) tablet 1 tablet     potassium chloride (KLOR-CON) Packet 20 mEq     predniSONE (DELTASONE) tablet 5 mg     tolterodine (DETROL LA) 24 hr capsule 4 mg             Physical Exam:   Vitals were reviewed  Blood pressure (!) 146/93, temperature 98.2  F (36.8  C), temperature source Oral, resp. rate 18, weight 57.2 kg (126 lb 1.6 oz), SpO2 98 %, not currently breastfeeding.    Physical Exam:   General: AAOx3, NAD  HEENT: MMM, PERRLA, EOM intact  CV: KRISTY heard best at LUSB, RRR, normal S1S2  Resp: Decreased breath sounds at right lung base  Abd: Soft, non-tender, BS+, + hepatomegaly  Extremities: Radial and pedal pulses intact and symmetric, no pedal edema  Neuro: No lateralizing symptoms or focal neurologic deficits         Data:   ROUTINE LABS (Last four results)  CMP  Recent  Labs  Lab 03/08/18  0908 03/07/18  1621 03/03/18  0007    129* 136   POTASSIUM 4.2 4.7 4.4   CHLORIDE 105 102 102   CO2 20 17* 24   ANIONGAP 10 11 10   GLC 83 103* 89   BUN 18 22 17   CR 0.66 0.66 0.68   GFRESTIMATED >90 >90 89   GFRESTBLACK >90 >90 >90   VIKTORIYA 8.5 9.5 9.0   PROTTOTAL  --  6.4*  --    ALBUMIN  --  3.7  --    BILITOTAL  --  0.8  --    ALKPHOS  --  178*  --    AST  --  150*  --    ALT  --  232*  --      CBC  Recent Labs  Lab 03/08/18  0908 03/07/18  1621 03/03/18  0007   WBC 4.3 4.8 4.9   RBC 3.49* 3.54* 3.59*   HGB 13.1 13.3 13.7   HCT 39.7 40.8 41.3   * 115* 115*   MCH 37.5* 37.6* 38.2*   MCHC 33.0 32.6 33.2   RDW 13.4 13.0 12.3   PLT 87* 88* 111*     INR  Recent Labs  Lab 03/07/18  1621   INR 3.42*     Arterial Blood GasNo lab results found in last 7 days.

## 2018-03-08 NOTE — PROGRESS NOTES
Care Coordinator Progress Note     Admission Date/Time:  3/7/2018  Attending MD:  Juan Eaton MD     Data  Chart reviewed, discussed with interdisciplinary team.   Patient with history of Afib (diagnosed in May 2017 with cardiac arrest), DLBCL (in remission since Dec), RA, h/o VSD repair who presents today with shortness of breath with exertion and palpable splenomegaly.   Assessment  Concerns with insurance coverage for discharge needs: None.  Current Living Situation: Patient lives with spouse.  Support System: Supportive and Involved spouse  Services Involved: [facility]  Transportation: Family or Friend will provide  Barriers to Discharge: Medical plan of care, chronic illness    Coordination of Care and Referrals: Provided patient/family with options for resumption of home services. Pt states she has been receiving weekly skilled nursing visits through [facility] for wound care weekly and she would like to resume upon discharge. Per [facility] liason, pt is open to [facility] palliative RN program.  Pt requesting pamphlet for EAP program. This writer called over to employee services office and they stated they will send one over tonight.   Intervention  Arrangements made with [facility] (P: [phone]) for resumption of care. Palliative care program. RN evaluation post hospitalization. Assess vital signs, respiratory and cardiac status, wound care weekly.      Plan  Anticipated Discharge Date: TBD  Anticipated Discharge Plan: TBD  CC will continue to monitor patient's medical condition and progress towards discharge.  Rose Mayes RN BSN  6C Unit Care Coordinator  Phone number: [phone]  Pager: [phone]

## 2018-03-08 NOTE — PLAN OF CARE
Problem: Patient Care Overview  Goal: Plan of Care/Patient Progress Review  Pt performed a walk test to measure any bradycardic events during exertion.  At the onset of the walk HR was 60 and during the walk pt HR reached 80bpm.  Walk was ~3mins in duration.

## 2018-03-08 NOTE — PLAN OF CARE
Problem: Patient Care Overview  Goal: Plan of Care/Patient Progress Review  Outcome: No Change  /85 (BP Location: Right arm)  Temp 98.2  F (36.8  C)  Resp 18  Wt 57.2 kg (126 lb 1.6 oz)  SpO2 98%  BMI 26.35 kg/m2    VSS on RA. SB/SR. Up ad emelia. Denies CP or palpitations. Pt states she gets SOB with any exertion. PIV saline locked. NPO since midnight. L arm wound dressing CDI. MRI checklist completed and sent down. MRI and ultrasound to be completed today. Heme/onc, WOC, and Social Work to consult. Pt calls appropriately and is able to make needs known. Will continue to monitor and follow POC.

## 2018-03-09 ENCOUNTER — TELEPHONE (OUTPATIENT)
Dept: WOUND CARE | Facility: CLINIC | Age: 58
End: 2018-03-09

## 2018-03-09 LAB
ALBUMIN SERPL-MCNC: 3.4 G/DL (ref 3.4–5)
ALP SERPL-CCNC: 159 U/L (ref 40–150)
ALT SERPL W P-5'-P-CCNC: 172 U/L (ref 0–50)
ANION GAP SERPL CALCULATED.3IONS-SCNC: 9 MMOL/L (ref 3–14)
AST SERPL W P-5'-P-CCNC: 80 U/L (ref 0–45)
BILIRUB SERPL-MCNC: 0.7 MG/DL (ref 0.2–1.3)
BUN SERPL-MCNC: 18 MG/DL (ref 7–30)
CALCIUM SERPL-MCNC: 8.4 MG/DL (ref 8.5–10.1)
CHLORIDE SERPL-SCNC: 104 MMOL/L (ref 94–109)
CO2 SERPL-SCNC: 23 MMOL/L (ref 20–32)
CREAT SERPL-MCNC: 0.71 MG/DL (ref 0.52–1.04)
ERYTHROCYTE [DISTWIDTH] IN BLOOD BY AUTOMATED COUNT: 13.7 % (ref 10–15)
GFR SERPL CREATININE-BSD FRML MDRD: 84 ML/MIN/1.7M2
GLUCOSE SERPL-MCNC: 90 MG/DL (ref 70–99)
HCT VFR BLD AUTO: 38.8 % (ref 35–47)
HGB BLD-MCNC: 12.7 G/DL (ref 11.7–15.7)
MAGNESIUM SERPL-MCNC: 1.6 MG/DL (ref 1.6–2.3)
MCH RBC QN AUTO: 37.4 PG (ref 26.5–33)
MCHC RBC AUTO-ENTMCNC: 32.7 G/DL (ref 31.5–36.5)
MCV RBC AUTO: 114 FL (ref 78–100)
PLATELET # BLD AUTO: 76 10E9/L (ref 150–450)
POTASSIUM SERPL-SCNC: 3.5 MMOL/L (ref 3.4–5.3)
PROT SERPL-MCNC: 6 G/DL (ref 6.8–8.8)
RBC # BLD AUTO: 3.4 10E12/L (ref 3.8–5.2)
SODIUM SERPL-SCNC: 136 MMOL/L (ref 133–144)
WBC # BLD AUTO: 4.2 10E9/L (ref 4–11)

## 2018-03-09 PROCEDURE — 93005 ELECTROCARDIOGRAM TRACING: CPT

## 2018-03-09 PROCEDURE — 83735 ASSAY OF MAGNESIUM: CPT | Performed by: INTERNAL MEDICINE

## 2018-03-09 PROCEDURE — 36415 COLL VENOUS BLD VENIPUNCTURE: CPT | Performed by: INTERNAL MEDICINE

## 2018-03-09 PROCEDURE — 25000132 ZZH RX MED GY IP 250 OP 250 PS 637: Performed by: INTERNAL MEDICINE

## 2018-03-09 PROCEDURE — 25000125 ZZHC RX 250: Performed by: INTERNAL MEDICINE

## 2018-03-09 PROCEDURE — 25000128 H RX IP 250 OP 636: Performed by: INTERNAL MEDICINE

## 2018-03-09 PROCEDURE — 21400006 ZZH R&B CCU INTERMEDIATE UMMC

## 2018-03-09 PROCEDURE — 80053 COMPREHEN METABOLIC PANEL: CPT | Performed by: INTERNAL MEDICINE

## 2018-03-09 PROCEDURE — 85027 COMPLETE CBC AUTOMATED: CPT | Performed by: INTERNAL MEDICINE

## 2018-03-09 PROCEDURE — 99232 SBSQ HOSP IP/OBS MODERATE 35: CPT | Mod: GC | Performed by: INTERNAL MEDICINE

## 2018-03-09 PROCEDURE — 93010 ELECTROCARDIOGRAM REPORT: CPT | Mod: 76 | Performed by: INTERNAL MEDICINE

## 2018-03-09 RX ORDER — METOPROLOL TARTRATE 1 MG/ML
5 INJECTION, SOLUTION INTRAVENOUS EVERY 5 MIN PRN
Status: DISCONTINUED | OUTPATIENT
Start: 2018-03-09 | End: 2018-03-13 | Stop reason: HOSPADM

## 2018-03-09 RX ORDER — ATENOLOL 25 MG/1
25 TABLET ORAL 2 TIMES DAILY
Status: DISCONTINUED | OUTPATIENT
Start: 2018-03-09 | End: 2018-03-13 | Stop reason: HOSPADM

## 2018-03-09 RX ORDER — SPIRONOLACTONE 25 MG/1
25 TABLET ORAL DAILY
Status: DISCONTINUED | OUTPATIENT
Start: 2018-03-09 | End: 2018-03-13 | Stop reason: HOSPADM

## 2018-03-09 RX ORDER — VERAPAMIL HYDROCHLORIDE 2.5 MG/ML
2.5 INJECTION, SOLUTION INTRAVENOUS
Status: DISCONTINUED | OUTPATIENT
Start: 2018-03-09 | End: 2018-03-09

## 2018-03-09 RX ORDER — VERAPAMIL HYDROCHLORIDE 2.5 MG/ML
2.5 INJECTION, SOLUTION INTRAVENOUS ONCE
Status: COMPLETED | OUTPATIENT
Start: 2018-03-09 | End: 2018-03-09

## 2018-03-09 RX ORDER — FUROSEMIDE 10 MG/ML
20 INJECTION INTRAMUSCULAR; INTRAVENOUS ONCE
Status: COMPLETED | OUTPATIENT
Start: 2018-03-09 | End: 2018-03-09

## 2018-03-09 RX ADMIN — CALCIUM POLYCARBOPHIL 625 MG: 625 TABLET, FILM COATED ORAL at 14:28

## 2018-03-09 RX ADMIN — VERAPAMIL HYDROCHLORIDE 2.5 MG: 2.5 INJECTION INTRAVENOUS at 10:00

## 2018-03-09 RX ADMIN — ATENOLOL 25 MG: 25 TABLET ORAL at 12:01

## 2018-03-09 RX ADMIN — GABAPENTIN 200 MG: 100 CAPSULE ORAL at 20:06

## 2018-03-09 RX ADMIN — OXYCODONE HYDROCHLORIDE AND ACETAMINOPHEN 500 MG: 500 TABLET ORAL at 14:28

## 2018-03-09 RX ADMIN — PREDNISONE 5 MG: 5 TABLET ORAL at 08:36

## 2018-03-09 RX ADMIN — APIXABAN 5 MG: 5 TABLET, FILM COATED ORAL at 08:35

## 2018-03-09 RX ADMIN — POTASSIUM CHLORIDE 20 MEQ: 1.5 POWDER, FOR SOLUTION ORAL at 08:37

## 2018-03-09 RX ADMIN — GABAPENTIN 200 MG: 100 CAPSULE ORAL at 14:25

## 2018-03-09 RX ADMIN — SPIRONOLACTONE 25 MG: 25 TABLET ORAL at 08:37

## 2018-03-09 RX ADMIN — TOLTERODINE 4 MG: 4 CAPSULE, EXTENDED RELEASE ORAL at 08:37

## 2018-03-09 RX ADMIN — HYDROXYCHLOROQUINE SULFATE 200 MG: 200 TABLET, FILM COATED ORAL at 08:36

## 2018-03-09 RX ADMIN — Medication 2 TABLET: at 14:28

## 2018-03-09 RX ADMIN — CIPROFLOXACIN HYDROCHLORIDE 500 MG: 500 TABLET, FILM COATED ORAL at 08:35

## 2018-03-09 RX ADMIN — GABAPENTIN 200 MG: 100 CAPSULE ORAL at 08:36

## 2018-03-09 RX ADMIN — MULTIPLE VITAMINS W/ MINERALS TAB 1 TABLET: TAB at 14:29

## 2018-03-09 RX ADMIN — ATENOLOL 25 MG: 25 TABLET ORAL at 20:06

## 2018-03-09 RX ADMIN — METOPROLOL TARTRATE 5 MG: 5 INJECTION INTRAVENOUS at 19:07

## 2018-03-09 RX ADMIN — HYDROXYCHLOROQUINE SULFATE 200 MG: 200 TABLET, FILM COATED ORAL at 20:06

## 2018-03-09 RX ADMIN — CIPROFLOXACIN HYDROCHLORIDE 500 MG: 500 TABLET, FILM COATED ORAL at 20:06

## 2018-03-09 RX ADMIN — Medication 2 G: at 10:04

## 2018-03-09 RX ADMIN — APIXABAN 5 MG: 5 TABLET, FILM COATED ORAL at 20:06

## 2018-03-09 RX ADMIN — FUROSEMIDE 20 MG: 10 INJECTION, SOLUTION INTRAVENOUS at 08:37

## 2018-03-09 RX ADMIN — VERAPAMIL HYDROCHLORIDE 2.5 MG: 2.5 INJECTION INTRAVENOUS at 15:37

## 2018-03-09 NOTE — PLAN OF CARE
Problem: Patient Care Overview  Goal: Plan of Care/Patient Progress Review  Outcome: No Change  Pt admitted with SOB, LE edema and abdominal distension. Hx of DLBCL, RA, prior cardiac arrest. VSS, RA, no c/o pain, T max=99, SR/SB on tele. Lft arm wound dressing intact, daily changes. Pt with diarrhea (on Cipro for UTI treatment), up independently to bedside commode. Continue to monitor, follow poc, alert C1 MD's with changes and/or concerns.

## 2018-03-09 NOTE — TELEPHONE ENCOUNTER
Received a call from patient's insurance company.  They had left several messages but attempting to call them back has been difficult since they are waiting on hold has been long.  When I spoke to her representative with patient's issue number [id number] they requested records from November 1 last year until February this year.  They were looking for all the patient's records which are numerous so I have given them the phone number to medical records.  They were not looking for my specific dictations. Shirley Eller

## 2018-03-09 NOTE — PLAN OF CARE
Problem: Patient Care Overview  Goal: Plan of Care/Patient Progress Review  Pt A&Ox4, VSS, SB/SR, afebrile, denies pain.  Pt had MRI & Abd US today.  WOC nursing & SW consulted today.  Pt had a walking assessment for bradycardic episodes, none were noted per tele tech, 60-80bpm. Continue to monitor and contact Cards 1 with concerns.

## 2018-03-09 NOTE — CONSULTS
SW consult received.  Pt wanting resources for EAP program with [facility].  RNCC also assisting with this resource.  Please see RNCC details for discharge planning to home.  SW to close order.  Please re-consult if other needs arise.    HOMERO Larsen, APSW  6C Unit   Phone: [phone]  Pager: [phone]  Unit: [phone]

## 2018-03-09 NOTE — PROGRESS NOTES
[facility]  Patient is currently open to home care services with [facility].  The patient is currently receiving Palliative care RN services.  [facility]  and team have been notified of patient admission.  [facility] liaison will continue to follow patient during stay.  If appropriate provide orders to resume home care at time of discharge.    Monalisa Burris RN Liaison   [facility]

## 2018-03-09 NOTE — PROGRESS NOTES
[facility] Cardiology Progress Note          Assessment and Plan:   Assessment: 58yo F with PMH of DLBCL in remission (last PET scan 1/15/2018), RA, atrial tachyarrythmias, history of Afib and prior cardiac arrest (which eventually led to her diagnosis of DLBCL) who presents with shortness of breath on exertion, lower extremity edema, and abdominal distension in the setting of recent lethargy and UTI      Today  Will resume atenolol 25mg bid for now for afib    Plan:  #HFpEF exacerbation  Last echo 9/2017 with EF 60-65%, dilated IVC. Presented with one week of lethargy, poor appetite, nausea/vomiting which then led to 1-2 days of shortness of breath, lower extremity edema, and abdominal distension. Presented to ED on 3/2 in Afib with RVR, s/p cardioversion.  -- diuresis with 20mg IV lasix 3/8 and 3/9  -- cardiac MRI with new pericarditis sign and pericardial effusion  --starting spironolactone    #Elevated transaminases, hepatomegaly  RUQ ultrasound consistent with congestion  --trend liver enzymes    #Hypervolemic hyponatremia  -- continue to trend    #p-afib  #Recent Afib with RVR s/p cardioversion 3/2/2018 at ED  Pafib. Chadsvasc of 2(female and CHF). On apixaban.  Was on atenolol 25 mg. It is held because walk test 3 min with HR of 80 bpm. Pt states once a week pafib but does not last long usually. Up to an hour.  -Will resume atenolol 25mg bid for now  -consider pharmacological / electrical cardioversion if lasts long    #Recent UTI  Complete cipro course    #RA: continue PTA plaquenil, prednisone 5mg   #Pain: continue gabapentin, APAP    FEN: cardiac diet  Prophylaxis: ambulate  Code StatuS: Full  Disposition: 1-2 days    Discussed with Dr. Randolph Thomson MD  Cardiology Fellow [id number]    Attending: Patient seen and examined with Dr. Thomson. The history and physical findings are accurate as recorded. My additional findings, if any, have been incorporated into the body of the note. All labs,  imaging studies, ECG and telemetry data have been reviewed personally. The assessment and plans outlined reflect our joint decision making.    Shashi Dickson MD            Interval History:   No events overnight. Feeling better this morning. Was thinking discharge today but then converted into afib.          Medications:     Current Facility-Administered Medications   Medication     naloxone (NARCAN) injection 0.1-0.4 mg     melatonin tablet 1 mg     potassium chloride SA (K-DUR/KLOR-CON M) CR tablet 20-40 mEq     potassium chloride (KLOR-CON) Packet 20-40 mEq     potassium chloride 10 mEq in 100 mL sterile water intermittent infusion (premix)     potassium chloride 10 mEq in 100 mL intermittent infusion with 10 mg lidocaine     potassium chloride 20 mEq in 50 mL intermittent infusion     magnesium sulfate 2 g in NS intermittent infusion (PharMEDium or [facility] Cmpd)     magnesium sulfate 4 g in 100 mL sterile water (premade)     senna-docusate (SENOKOT-S;PERICOLACE) 8.6-50 MG per tablet 1 tablet    Or     senna-docusate (SENOKOT-S;PERICOLACE) 8.6-50 MG per tablet 2 tablet     acetaminophen (TYLENOL) tablet 650 mg     apixaban ANTICOAGULANT (ELIQUIS) tablet 5 mg     ascorbic acid (VITAMIN C) tablet 500 mg     calcium polycarbophil (FIBERCON) tablet 625 mg     calcium-vitamin D (CALTRATE) 600-400 MG-UNIT per tablet 2 tablet     ciprofloxacin (CIPRO) tablet 500 mg     gabapentin (NEURONTIN) capsule 200 mg     hydroxychloroquine (PLAQUENIL) tablet 200 mg     multivitamin, therapeutic with minerals (THERA-VIT-M) tablet 1 tablet     potassium chloride (KLOR-CON) Packet 20 mEq     predniSONE (DELTASONE) tablet 5 mg     tolterodine (DETROL LA) 24 hr capsule 4 mg             Physical Exam:   Vitals were reviewed  Blood pressure 114/81, temperature 98.7  F (37.1  C), temperature source Oral, resp. rate 16, weight 55.4 kg (122 lb 3.2 oz), SpO2 97 %, not currently breastfeeding.    Physical Exam:   General: AAOx3, NAD  HEENT: MMM,  PERRLA, EOM intact  CV: KRISTY heard best at LUSB, RRR, normal S1S2  Resp: Decreased breath sounds at right lung base  Abd: Soft, non-tender, BS+, + hepatomegaly  Extremities: Radial and pedal pulses intact and symmetric, no pedal edema  Neuro: No lateralizing symptoms or focal neurologic deficits         Data:   ROUTINE LABS (Last four results)  CMP    Recent Labs  Lab 03/09/18  0626 03/08/18  0908 03/07/18  1621 03/03/18  0007    135 129* 136   POTASSIUM 3.5 4.2 4.7 4.4   CHLORIDE 104 105 102 102   CO2 23 20 17* 24   ANIONGAP 9 10 11 10   GLC 90 83 103* 89   BUN 18 18 22 17   CR 0.71 0.66 0.66 0.68   GFRESTIMATED 84 >90 >90 89   GFRESTBLACK >90 >90 >90 >90   VIKTORIYA 8.4* 8.5 9.5 9.0   PROTTOTAL 6.0*  --  6.4*  --    ALBUMIN 3.4  --  3.7  --    BILITOTAL 0.7  --  0.8  --    ALKPHOS 159*  --  178*  --    AST 80*  --  150*  --    *  --  232*  --      CBC    Recent Labs  Lab 03/09/18  0626 03/08/18  0908 03/07/18  1621 03/03/18  0007   WBC 4.2 4.3 4.8 4.9   RBC 3.40* 3.49* 3.54* 3.59*   HGB 12.7 13.1 13.3 13.7   HCT 38.8 39.7 40.8 41.3   * 114* 115* 115*   MCH 37.4* 37.5* 37.6* 38.2*   MCHC 32.7 33.0 32.6 33.2   RDW 13.7 13.4 13.0 12.3   PLT 76* 87* 88* 111*     INR    Recent Labs  Lab 03/07/18  1621   INR 3.42*     Arterial Blood GasNo lab results found in last 7 days.

## 2018-03-10 LAB
ALBUMIN SERPL-MCNC: 3.2 G/DL (ref 3.4–5)
ALP SERPL-CCNC: 143 U/L (ref 40–150)
ALT SERPL W P-5'-P-CCNC: 136 U/L (ref 0–50)
ANION GAP SERPL CALCULATED.3IONS-SCNC: 8 MMOL/L (ref 3–14)
AST SERPL W P-5'-P-CCNC: 56 U/L (ref 0–45)
BILIRUB SERPL-MCNC: 0.8 MG/DL (ref 0.2–1.3)
BUN SERPL-MCNC: 14 MG/DL (ref 7–30)
CALCIUM SERPL-MCNC: 8.6 MG/DL (ref 8.5–10.1)
CHLORIDE SERPL-SCNC: 104 MMOL/L (ref 94–109)
CO2 SERPL-SCNC: 25 MMOL/L (ref 20–32)
CREAT SERPL-MCNC: 0.65 MG/DL (ref 0.52–1.04)
ERYTHROCYTE [DISTWIDTH] IN BLOOD BY AUTOMATED COUNT: 13.5 % (ref 10–15)
GFR SERPL CREATININE-BSD FRML MDRD: >90 ML/MIN/1.7M2
GLUCOSE SERPL-MCNC: 87 MG/DL (ref 70–99)
HCT VFR BLD AUTO: 40.7 % (ref 35–47)
HGB BLD-MCNC: 13.5 G/DL (ref 11.7–15.7)
LACTATE BLD-SCNC: 0.9 MMOL/L (ref 0.4–1.9)
MAGNESIUM SERPL-MCNC: 2.1 MG/DL (ref 1.6–2.3)
MCH RBC QN AUTO: 37.6 PG (ref 26.5–33)
MCHC RBC AUTO-ENTMCNC: 33.2 G/DL (ref 31.5–36.5)
MCV RBC AUTO: 113 FL (ref 78–100)
PLATELET # BLD AUTO: 82 10E9/L (ref 150–450)
POTASSIUM SERPL-SCNC: 3.6 MMOL/L (ref 3.4–5.3)
PROT SERPL-MCNC: 5.9 G/DL (ref 6.8–8.8)
RBC # BLD AUTO: 3.59 10E12/L (ref 3.8–5.2)
SODIUM SERPL-SCNC: 137 MMOL/L (ref 133–144)
WBC # BLD AUTO: 3.8 10E9/L (ref 4–11)

## 2018-03-10 PROCEDURE — 25000132 ZZH RX MED GY IP 250 OP 250 PS 637: Performed by: INTERNAL MEDICINE

## 2018-03-10 PROCEDURE — 85027 COMPLETE CBC AUTOMATED: CPT | Performed by: INTERNAL MEDICINE

## 2018-03-10 PROCEDURE — 83735 ASSAY OF MAGNESIUM: CPT | Performed by: INTERNAL MEDICINE

## 2018-03-10 PROCEDURE — 36415 COLL VENOUS BLD VENIPUNCTURE: CPT | Performed by: INTERNAL MEDICINE

## 2018-03-10 PROCEDURE — 21400006 ZZH R&B CCU INTERMEDIATE UMMC

## 2018-03-10 PROCEDURE — 83605 ASSAY OF LACTIC ACID: CPT | Performed by: INTERNAL MEDICINE

## 2018-03-10 PROCEDURE — 99232 SBSQ HOSP IP/OBS MODERATE 35: CPT | Mod: GC | Performed by: INTERNAL MEDICINE

## 2018-03-10 PROCEDURE — 80053 COMPREHEN METABOLIC PANEL: CPT | Performed by: INTERNAL MEDICINE

## 2018-03-10 PROCEDURE — 25000125 ZZHC RX 250: Performed by: INTERNAL MEDICINE

## 2018-03-10 RX ORDER — COLCHICINE 0.6 MG/1
0.6 TABLET ORAL DAILY
Status: DISCONTINUED | OUTPATIENT
Start: 2018-03-10 | End: 2018-03-13 | Stop reason: HOSPADM

## 2018-03-10 RX ADMIN — APIXABAN 5 MG: 5 TABLET, FILM COATED ORAL at 20:03

## 2018-03-10 RX ADMIN — GABAPENTIN 200 MG: 100 CAPSULE ORAL at 20:04

## 2018-03-10 RX ADMIN — MULTIPLE VITAMINS W/ MINERALS TAB 1 TABLET: TAB at 14:03

## 2018-03-10 RX ADMIN — GABAPENTIN 200 MG: 100 CAPSULE ORAL at 08:32

## 2018-03-10 RX ADMIN — ATENOLOL 25 MG: 25 TABLET ORAL at 20:03

## 2018-03-10 RX ADMIN — PREDNISONE 5 MG: 5 TABLET ORAL at 08:31

## 2018-03-10 RX ADMIN — SPIRONOLACTONE 25 MG: 25 TABLET ORAL at 08:31

## 2018-03-10 RX ADMIN — APIXABAN 5 MG: 5 TABLET, FILM COATED ORAL at 08:31

## 2018-03-10 RX ADMIN — CIPROFLOXACIN HYDROCHLORIDE 500 MG: 500 TABLET, FILM COATED ORAL at 20:04

## 2018-03-10 RX ADMIN — POTASSIUM CHLORIDE 20 MEQ: 1.5 POWDER, FOR SOLUTION ORAL at 08:32

## 2018-03-10 RX ADMIN — CALCIUM POLYCARBOPHIL 625 MG: 625 TABLET, FILM COATED ORAL at 14:03

## 2018-03-10 RX ADMIN — TOLTERODINE 4 MG: 4 CAPSULE, EXTENDED RELEASE ORAL at 08:31

## 2018-03-10 RX ADMIN — HYDROXYCHLOROQUINE SULFATE 200 MG: 200 TABLET, FILM COATED ORAL at 08:31

## 2018-03-10 RX ADMIN — COLCHICINE 0.6 MG: 0.6 TABLET, FILM COATED ORAL at 16:39

## 2018-03-10 RX ADMIN — Medication 2 TABLET: at 14:03

## 2018-03-10 RX ADMIN — GABAPENTIN 200 MG: 100 CAPSULE ORAL at 14:03

## 2018-03-10 RX ADMIN — HYDROXYCHLOROQUINE SULFATE 200 MG: 200 TABLET, FILM COATED ORAL at 20:03

## 2018-03-10 RX ADMIN — ATENOLOL 25 MG: 25 TABLET ORAL at 08:31

## 2018-03-10 RX ADMIN — CIPROFLOXACIN HYDROCHLORIDE 500 MG: 500 TABLET, FILM COATED ORAL at 08:32

## 2018-03-10 RX ADMIN — OXYCODONE HYDROCHLORIDE AND ACETAMINOPHEN 500 MG: 500 TABLET ORAL at 14:03

## 2018-03-10 NOTE — PROGRESS NOTES
[facility] Cardiology Progress Note          Assessment and Plan:   Assessment: 56yo F with PMH of DLBCL in remission (last PET scan 1/15/2018), RA, atrial tachyarrythmias, history of Afib and prior cardiac arrest (which eventually led to her diagnosis of DLBCL) who presents with shortness of breath on exertion, lower extremity edema, and abdominal distension in the setting of recent lethargy and UTI      Today  Heme/onc consult    Plan:  #HFpEF exacerbation  Last echo 9/2017 with EF 60-65%, dilated IVC. Presented with one week of lethargy, poor appetite, nausea/vomiting which then led to 1-2 days of shortness of breath, lower extremity edema, and abdominal distension. Presented to ED on 3/2 in Afib with RVR, s/p cardioversion.  -- diuresis with 20mg IV lasix 3/8 and 3/9  --started spironolactone    #Loculated pericardial effusion, pericarditis on cMRI  Loculated pericardial effusion found on cMRI, new from prior. May represent viral process (given history) though presenting symptoms quite similar to presenting symptoms when ultimately DLBCL was diagnosed. Concerned about possibility of recurrence  -- appreciate heme/onc recs      #Elevated transaminases, hepatomegaly  RUQ ultrasound consistent with congestion  --trend liver enzymes    #Hypervolemic hyponatremia  -- continue to trend    #p-afib  #Recent Afib with RVR s/p cardioversion 3/2/2018 at ED  Pafib. Chadsvasc of 2(female and CHF). On apixaban.  Was on atenolol 25 mg. It is held because walk test 3 min with HR of 80 bpm. Pt states once a week pafib but does not last long usually. Up to an hour.  -Will resume atenolol 25mg bid for now  -plan for cardioversion on Monday   - on apixaban    #Recent UTI  Complete cipro course    #RA: continue PTA plaquenil, prednisone 5mg   #Pain: continue gabapentin, APAP    FEN: cardiac diet  Prophylaxis: ambulate  Code StatuS: Full  Disposition: 1-2 days    Discussed with Dr. Angelito Roque MD PGY-3  Internal  Medicine  [phone]    CARDIOLOGY STAFF NOTE  I have seen and examined the patient with the Cards 1 team. I agree with the note above that reflects my assessment and plan of care.  Juan Eaton MD [facility]  Cardiology - Electrophysiology            Interval History:   No events overnight. Went into Afib yesterday. Feels palpitations with activity. Shortness of breath, edema, and abd distension has improved.          Medications:     Current Facility-Administered Medications   Medication     spironolactone (ALDACTONE) tablet 25 mg     atenolol (TENORMIN) tablet 25 mg     metoprolol (LOPRESSOR) injection 5 mg     naloxone (NARCAN) injection 0.1-0.4 mg     melatonin tablet 1 mg     potassium chloride SA (K-DUR/KLOR-CON M) CR tablet 20-40 mEq     potassium chloride (KLOR-CON) Packet 20-40 mEq     potassium chloride 10 mEq in 100 mL sterile water intermittent infusion (premix)     potassium chloride 10 mEq in 100 mL intermittent infusion with 10 mg lidocaine     potassium chloride 20 mEq in 50 mL intermittent infusion     magnesium sulfate 2 g in NS intermittent infusion (PharMEDium or [facility] Cmpd)     magnesium sulfate 4 g in 100 mL sterile water (premade)     senna-docusate (SENOKOT-S;PERICOLACE) 8.6-50 MG per tablet 1 tablet    Or     senna-docusate (SENOKOT-S;PERICOLACE) 8.6-50 MG per tablet 2 tablet     acetaminophen (TYLENOL) tablet 650 mg     apixaban ANTICOAGULANT (ELIQUIS) tablet 5 mg     ascorbic acid (VITAMIN C) tablet 500 mg     calcium polycarbophil (FIBERCON) tablet 625 mg     calcium-vitamin D (CALTRATE) 600-400 MG-UNIT per tablet 2 tablet     ciprofloxacin (CIPRO) tablet 500 mg     gabapentin (NEURONTIN) capsule 200 mg     hydroxychloroquine (PLAQUENIL) tablet 200 mg     multivitamin, therapeutic with minerals (THERA-VIT-M) tablet 1 tablet     potassium chloride (KLOR-CON) Packet 20 mEq     predniSONE (DELTASONE) tablet 5 mg     tolterodine (DETROL LA) 24 hr capsule 4 mg             Physical Exam:    Vitals were reviewed  Blood pressure 104/75, temperature 98.6  F (37  C), temperature source Oral, resp. rate 18, weight 54.3 kg (119 lb 11.2 oz), SpO2 94 %, not currently breastfeeding.    Physical Exam:   General: AAOx3, NAD  HEENT: MMM, PERRLA, EOM intact  CV: KRISTY heard best at LUSB, RRR, normal S1S2  Resp: Decreased breath sounds at right lung base  Abd: Soft, non-tender, BS+, + hepatomegaly  Extremities: Radial and pedal pulses intact and symmetric, no pedal edema  Neuro: No lateralizing symptoms or focal neurologic deficits         Data:   ROUTINE LABS (Last four results)  CMP    Recent Labs  Lab 03/10/18  0626 03/09/18  0626 03/08/18  0908 03/07/18  1621    136 135 129*   POTASSIUM 3.6 3.5 4.2 4.7   CHLORIDE 104 104 105 102   CO2 25 23 20 17*   ANIONGAP 8 9 10 11   GLC 87 90 83 103*   BUN 14 18 18 22   CR 0.65 0.71 0.66 0.66   GFRESTIMATED >90 84 >90 >90   GFRESTBLACK >90 >90 >90 >90   VIKTORIYA 8.6 8.4* 8.5 9.5   MAG 2.1 1.6  --   --    PROTTOTAL 5.9* 6.0*  --  6.4*   ALBUMIN 3.2* 3.4  --  3.7   BILITOTAL 0.8 0.7  --  0.8   ALKPHOS 143 159*  --  178*   AST 56* 80*  --  150*   * 172*  --  232*     CBC    Recent Labs  Lab 03/10/18  0626 03/09/18  0626 03/08/18  0908 03/07/18  1621   WBC 3.8* 4.2 4.3 4.8   RBC 3.59* 3.40* 3.49* 3.54*   HGB 13.5 12.7 13.1 13.3   HCT 40.7 38.8 39.7 40.8   * 114* 114* 115*   MCH 37.6* 37.4* 37.5* 37.6*   MCHC 33.2 32.7 33.0 32.6   RDW 13.5 13.7 13.4 13.0   PLT 82* 76* 87* 88*     INR    Recent Labs  Lab 03/07/18  1621   INR 3.42*     Arterial Blood GasNo lab results found in last 7 days.

## 2018-03-10 NOTE — PLAN OF CARE
Problem: Patient Care Overview  Goal: Plan of Care/Patient Progress Review  Outcome: No Change   03/08/18 2004 03/09/18 0610   OTHER   Plan Of Care Reviewed With --  patient   Plan of Care Review   Progress improving --      D: Primary pulmonary hypertension (H)  (primary encounter diagnosis)    I: Monitored vitals and assessed pt status.   Changed:N/A  Running:N/A  PRN:N/A    A: A0x4. AVSS, on RA. A-fib HR in .  Denies pain and nausea.  Mg and K was replaced during the evening.  L-arm wound dressing c/d/i.  Up independent and voiding spontaneously via walker.       Temp:  [98.4  F (36.9  C)-98.7  F (37.1  C)] 98.6  F (37  C)  Heart Rate:  [] 120  Resp:  [16-18] 18  BP: (105-138)/(77-98) 120/92  SpO2:  [95 %-98 %] 95 %      P: Continue to monitor Pt status and report changes to treatment team.

## 2018-03-10 NOTE — PLAN OF CARE
Problem: Patient Care Overview  Goal: Plan of Care/Patient Progress Review  D/I/A: Pt here w/ CHF exacerbation (SOB, LE edema, abdominal distension). She was cardioverted on the 5th in the ED and converted back to a-fib on 3/9 w/ variable rates (90s-140s). K replaced. Fired sepsis protocol; lactate WNL. A-fib, BP stable, RA, independent.  P: Heme/onc consult; pericardiocentesis possibly Sunday; cardioversion Monday.

## 2018-03-10 NOTE — PLAN OF CARE
Problem: Patient Care Overview  Goal: Plan of Care/Patient Progress Review  D/I/A: Pt here w/ CHF exacerbation (SOB, LE edema, abdominal distension). She was cardioverted on the 5th in the ED and has been in SR since. However, around 0900 this morning she had a 6-7s run of SVT in the 170s followed by a-fib in the 120s-130s. Pt given 2.5mg iv verapamil x1; this helped reduce rate to 80s-90s. However, after 1-2 hours rate had again increased to 120s-130s. Repeated verapamil dose w/ similar results. Around 1850 pt stated she would be willing to try iv metoprolol; given per cards 1; results pending. Pt's BP remained stable t/o w/ diastolic elevated to 90s at times. Pt is independent in room. 20mg iv lasix given this AM per cards 1 w/ good UOP. Extravasation chronic wound on LUE bandage change done by pt w/ RN assistance. Mg replaced.  P: Continue to monitor. Pharmacological vs electrical treatment of a-fib.

## 2018-03-11 LAB
ALBUMIN SERPL-MCNC: 3.2 G/DL (ref 3.4–5)
ALP SERPL-CCNC: 132 U/L (ref 40–150)
ALT SERPL W P-5'-P-CCNC: 106 U/L (ref 0–50)
ANION GAP SERPL CALCULATED.3IONS-SCNC: 9 MMOL/L (ref 3–14)
AST SERPL W P-5'-P-CCNC: 39 U/L (ref 0–45)
BILIRUB SERPL-MCNC: 0.6 MG/DL (ref 0.2–1.3)
BUN SERPL-MCNC: 17 MG/DL (ref 7–30)
CALCIUM SERPL-MCNC: 8.7 MG/DL (ref 8.5–10.1)
CHLORIDE SERPL-SCNC: 106 MMOL/L (ref 94–109)
CO2 SERPL-SCNC: 24 MMOL/L (ref 20–32)
CREAT SERPL-MCNC: 0.65 MG/DL (ref 0.52–1.04)
ERYTHROCYTE [DISTWIDTH] IN BLOOD BY AUTOMATED COUNT: 13.7 % (ref 10–15)
GFR SERPL CREATININE-BSD FRML MDRD: >90 ML/MIN/1.7M2
GLUCOSE SERPL-MCNC: 87 MG/DL (ref 70–99)
HCT VFR BLD AUTO: 41.1 % (ref 35–47)
HGB BLD-MCNC: 13.5 G/DL (ref 11.7–15.7)
LDH SERPL L TO P-CCNC: 244 U/L (ref 81–234)
MCH RBC QN AUTO: 37.8 PG (ref 26.5–33)
MCHC RBC AUTO-ENTMCNC: 32.8 G/DL (ref 31.5–36.5)
MCV RBC AUTO: 115 FL (ref 78–100)
PLATELET # BLD AUTO: 72 10E9/L (ref 150–450)
POTASSIUM SERPL-SCNC: 3.9 MMOL/L (ref 3.4–5.3)
PROT SERPL-MCNC: 5.7 G/DL (ref 6.8–8.8)
RBC # BLD AUTO: 3.57 10E12/L (ref 3.8–5.2)
SODIUM SERPL-SCNC: 139 MMOL/L (ref 133–144)
URATE SERPL-MCNC: 6.5 MG/DL (ref 2.6–6)
WBC # BLD AUTO: 3.3 10E9/L (ref 4–11)

## 2018-03-11 PROCEDURE — 40000141 ZZH STATISTIC PERIPHERAL IV START W/O US GUIDANCE

## 2018-03-11 PROCEDURE — 80053 COMPREHEN METABOLIC PANEL: CPT | Performed by: INTERNAL MEDICINE

## 2018-03-11 PROCEDURE — 25000125 ZZHC RX 250: Performed by: INTERNAL MEDICINE

## 2018-03-11 PROCEDURE — 36415 COLL VENOUS BLD VENIPUNCTURE: CPT | Performed by: INTERNAL MEDICINE

## 2018-03-11 PROCEDURE — 85027 COMPLETE CBC AUTOMATED: CPT | Performed by: INTERNAL MEDICINE

## 2018-03-11 PROCEDURE — 25000132 ZZH RX MED GY IP 250 OP 250 PS 637: Performed by: INTERNAL MEDICINE

## 2018-03-11 PROCEDURE — 83615 LACTATE (LD) (LDH) ENZYME: CPT | Performed by: INTERNAL MEDICINE

## 2018-03-11 PROCEDURE — 21400006 ZZH R&B CCU INTERMEDIATE UMMC

## 2018-03-11 PROCEDURE — 84550 ASSAY OF BLOOD/URIC ACID: CPT | Performed by: INTERNAL MEDICINE

## 2018-03-11 PROCEDURE — 80048 BASIC METABOLIC PNL TOTAL CA: CPT | Performed by: INTERNAL MEDICINE

## 2018-03-11 PROCEDURE — 93010 ELECTROCARDIOGRAM REPORT: CPT | Mod: 76 | Performed by: INTERNAL MEDICINE

## 2018-03-11 PROCEDURE — 25000128 H RX IP 250 OP 636: Performed by: INTERNAL MEDICINE

## 2018-03-11 PROCEDURE — 93005 ELECTROCARDIOGRAM TRACING: CPT

## 2018-03-11 RX ORDER — FUROSEMIDE 10 MG/ML
20 INJECTION INTRAMUSCULAR; INTRAVENOUS ONCE
Status: COMPLETED | OUTPATIENT
Start: 2018-03-11 | End: 2018-03-11

## 2018-03-11 RX ADMIN — SPIRONOLACTONE 25 MG: 25 TABLET ORAL at 08:10

## 2018-03-11 RX ADMIN — APIXABAN 5 MG: 5 TABLET, FILM COATED ORAL at 19:55

## 2018-03-11 RX ADMIN — MULTIPLE VITAMINS W/ MINERALS TAB 1 TABLET: TAB at 13:50

## 2018-03-11 RX ADMIN — HYDROXYCHLOROQUINE SULFATE 200 MG: 200 TABLET, FILM COATED ORAL at 19:54

## 2018-03-11 RX ADMIN — FUROSEMIDE 20 MG: 10 INJECTION, SOLUTION INTRAVENOUS at 20:43

## 2018-03-11 RX ADMIN — OXYCODONE HYDROCHLORIDE AND ACETAMINOPHEN 500 MG: 500 TABLET ORAL at 13:50

## 2018-03-11 RX ADMIN — CIPROFLOXACIN HYDROCHLORIDE 500 MG: 500 TABLET, FILM COATED ORAL at 19:55

## 2018-03-11 RX ADMIN — ATENOLOL 25 MG: 25 TABLET ORAL at 19:54

## 2018-03-11 RX ADMIN — APIXABAN 5 MG: 5 TABLET, FILM COATED ORAL at 08:10

## 2018-03-11 RX ADMIN — Medication 2 TABLET: at 13:50

## 2018-03-11 RX ADMIN — POTASSIUM CHLORIDE 20 MEQ: 1.5 POWDER, FOR SOLUTION ORAL at 08:10

## 2018-03-11 RX ADMIN — GABAPENTIN 200 MG: 100 CAPSULE ORAL at 08:10

## 2018-03-11 RX ADMIN — TOLTERODINE 4 MG: 4 CAPSULE, EXTENDED RELEASE ORAL at 08:10

## 2018-03-11 RX ADMIN — POTASSIUM CHLORIDE 20 MEQ: 1.5 POWDER, FOR SOLUTION ORAL at 08:09

## 2018-03-11 RX ADMIN — CALCIUM POLYCARBOPHIL 625 MG: 625 TABLET, FILM COATED ORAL at 13:50

## 2018-03-11 RX ADMIN — PREDNISONE 5 MG: 5 TABLET ORAL at 08:10

## 2018-03-11 RX ADMIN — CIPROFLOXACIN HYDROCHLORIDE 500 MG: 500 TABLET, FILM COATED ORAL at 08:11

## 2018-03-11 RX ADMIN — HYDROXYCHLOROQUINE SULFATE 200 MG: 200 TABLET, FILM COATED ORAL at 08:10

## 2018-03-11 RX ADMIN — ATENOLOL 25 MG: 25 TABLET ORAL at 08:11

## 2018-03-11 RX ADMIN — GABAPENTIN 200 MG: 100 CAPSULE ORAL at 19:55

## 2018-03-11 RX ADMIN — GABAPENTIN 200 MG: 100 CAPSULE ORAL at 13:49

## 2018-03-11 RX ADMIN — COLCHICINE 0.6 MG: 0.6 TABLET, FILM COATED ORAL at 08:10

## 2018-03-11 NOTE — PROGRESS NOTES
[facility] Cardiology Progress Note          Assessment and Plan:   Assessment: 58yo F with PMH of DLBCL in remission (last PET scan 1/15/2018), RA, atrial tachyarrythmias, history of Afib and prior cardiac arrest (which eventually led to her diagnosis of DLBCL) who presents with shortness of breath on exertion, lower extremity edema, and abdominal distension in the setting of recent lethargy and UTI      Today  -- No major changes. Cardioversion tomorrow.  -- NPO at [facility] for cardioversion tomorrow      Plan:  #HFpEF exacerbation  Last echo 9/2017 with EF 60-65%, dilated IVC. Presented with one week of lethargy, poor appetite, nausea/vomiting which then led to 1-2 days of shortness of breath, lower extremity edema, and abdominal distension. Presented to ED on 3/2 in Afib with RVR, s/p cardioversion.  -- diuresis with 20mg IV lasix 3/8 and 3/9. Holding further diuresis. Euvolemic  --started spironolactone    #Loculated pericardial effusion, pericarditis on cMRI  Loculated pericardial effusion found on cMRI, new from prior. May represent viral process (given history) though presenting symptoms quite similar to presenting symptoms when ultimately DLBCL was diagnosed. Recurrence less likely but can't be completely ruled out per heme /onc  -- appreciate heme/onc recs  -- colchicine started 3/10  -- continue colchicine x 4 weeks  -- repeat cMRI in 1 month, follow up with Dr. Eaton in 1 month    #Elevated transaminases, hepatomegaly  RUQ ultrasound consistent with congestion. LFTs improved with diuresis.  --trend liver enzymes    #Hypervolemic hyponatremia  -- continue to trend    #p-afib  #Recent Afib with RVR s/p cardioversion 3/2/2018 at ED  Pafib. Chadsvasc of 2(female and CHF). On apixaban.  -Will resume atenolol 25mg bid for now  -plan for cardioversion on Monday   - on apixaban    #Recent UTI  Completed cipro course    #RA: continue PTA plaquenil, prednisone 5mg   #Pain: continue gabapentin, APAP    FEN:  cardiac diet  Prophylaxis: ambulate  Code StatuS: Full  Disposition: 1-2 days    Discussed with Dr. Angelito Roque MD PGY-3  Internal Medicine  [phone]    CARDIOLOGY STAFF NOTE  I have seen and examined the patient with the Cards 1 team. I agree with the note above that reflects my assessment and plan of care.  Juan Eaton MD [facility]  Cardiology - Electrophysiology          Interval History:   No events overnight. Continues to be in Afib, rates 130s. Feels palpitations/pounding in her head. No shortness of breath, chest pain.         Medications:     Current Facility-Administered Medications   Medication     colchicine tablet 0.6 mg     sodium chloride (PF) 0.9% PF flush 3 mL     spironolactone (ALDACTONE) tablet 25 mg     atenolol (TENORMIN) tablet 25 mg     metoprolol (LOPRESSOR) injection 5 mg     naloxone (NARCAN) injection 0.1-0.4 mg     melatonin tablet 1 mg     potassium chloride SA (K-DUR/KLOR-CON M) CR tablet 20-40 mEq     potassium chloride (KLOR-CON) Packet 20-40 mEq     potassium chloride 10 mEq in 100 mL sterile water intermittent infusion (premix)     potassium chloride 10 mEq in 100 mL intermittent infusion with 10 mg lidocaine     potassium chloride 20 mEq in 50 mL intermittent infusion     magnesium sulfate 2 g in NS intermittent infusion (PharMEDium or FV Cmpd)     magnesium sulfate 4 g in 100 mL sterile water (premade)     senna-docusate (SENOKOT-S;PERICOLACE) 8.6-50 MG per tablet 1 tablet    Or     senna-docusate (SENOKOT-S;PERICOLACE) 8.6-50 MG per tablet 2 tablet     acetaminophen (TYLENOL) tablet 650 mg     apixaban ANTICOAGULANT (ELIQUIS) tablet 5 mg     ascorbic acid (VITAMIN C) tablet 500 mg     calcium polycarbophil (FIBERCON) tablet 625 mg     calcium-vitamin D (CALTRATE) 600-400 MG-UNIT per tablet 2 tablet     ciprofloxacin (CIPRO) tablet 500 mg     gabapentin (NEURONTIN) capsule 200 mg     hydroxychloroquine (PLAQUENIL) tablet 200 mg     multivitamin, therapeutic  with minerals (THERA-VIT-M) tablet 1 tablet     potassium chloride (KLOR-CON) Packet 20 mEq     predniSONE (DELTASONE) tablet 5 mg     tolterodine (DETROL LA) 24 hr capsule 4 mg             Physical Exam:   Vitals were reviewed  Blood pressure 107/78, pulse 133, temperature 98.1  F (36.7  C), temperature source Oral, resp. rate 16, weight 54.3 kg (119 lb 9.6 oz), SpO2 97 %, not currently breastfeeding.    Physical Exam:   General: AAOx3, NAD  HEENT: MMM, PERRLA, EOM intact  CV: KRISTY heard best at LUSB, tachycardic, irregularly irregular, normal S1S2  Resp: Decreased breath sounds at right lung base  Abd: Soft, non-tender, BS+, + hepatomegaly  Extremities: Radial and pedal pulses intact and symmetric, no pedal edema  Neuro: No lateralizing symptoms or focal neurologic deficits         Data:   ROUTINE LABS (Last four results)  CMP    Recent Labs  Lab 03/10/18  0626 03/09/18  0626 03/08/18  0908 03/07/18  1621    136 135 129*   POTASSIUM 3.6 3.5 4.2 4.7   CHLORIDE 104 104 105 102   CO2 25 23 20 17*   ANIONGAP 8 9 10 11   GLC 87 90 83 103*   BUN 14 18 18 22   CR 0.65 0.71 0.66 0.66   GFRESTIMATED >90 84 >90 >90   GFRESTBLACK >90 >90 >90 >90   VIKTORIYA 8.6 8.4* 8.5 9.5   MAG 2.1 1.6  --   --    PROTTOTAL 5.9* 6.0*  --  6.4*   ALBUMIN 3.2* 3.4  --  3.7   BILITOTAL 0.8 0.7  --  0.8   ALKPHOS 143 159*  --  178*   AST 56* 80*  --  150*   * 172*  --  232*     CBC    Recent Labs  Lab 03/10/18  0626 03/09/18  0626 03/08/18  0908 03/07/18  1621   WBC 3.8* 4.2 4.3 4.8   RBC 3.59* 3.40* 3.49* 3.54*   HGB 13.5 12.7 13.1 13.3   HCT 40.7 38.8 39.7 40.8   * 114* 114* 115*   MCH 37.6* 37.4* 37.5* 37.6*   MCHC 33.2 32.7 33.0 32.6   RDW 13.5 13.7 13.4 13.0   PLT 82* 76* 87* 88*     INR    Recent Labs  Lab 03/07/18  1621   INR 3.42*     Arterial Blood GasNo lab results found in last 7 days.

## 2018-03-11 NOTE — DISCHARGE SUMMARY
Cardiology Discharge Summary  Amelia Michel MRN: [id number]  1960  Home clinic: ***  Primary care provider: Comfort Sabillon  ___________________________________          Date of Admission:  3/7/2018  Date of Discharge:  3/11/2018 ***  Admitting Physician:  Juan Eaton MD  Discharge Physician:  Juan Eaton MD ***  Discharging Service:  [facility] ***     Primary Provider: Comfort Sabillon         Reason for Admission:   Shortness of breath with exertion    Ms. Michel is a  58yo female with history of Afib, DLBCL (in remission), RA, h/o VSD repair who is admitted from PCP clinic for 1-2 week of shortness of breath with exertion in setting of recent viral illness and UTI.          Discharge Diagnosis:   Acute exacerbation of heart failure with preserved ejection fraction  Loculated pericardial effusion with pericarditis  Hepatomegaly and elevated transaminases 2/2 congestion  Paroxysmal atrial fibrillation/flutter  Hyponatremia         Procedures & Significant Findings:   Cardiac MRI 3/8:     Comparison CMR: 07/24/2017  Suboptimal image quality due to cardiac gating artifact in the setting of frequent premature ventricular  contractions.      1. The left ventricle is normal in cavity size and wall thickness. The global systolic function is normal.  The LVEF is 55%. There is tethering of the inferior and lateral segments.      2. The right ventricle is normal in cavity size. The global systolic function is normal. The RVEF is 51%.      3. Both atria are severely dilated in size.     4. There is tricuspid, mitral and pulmonary regurgitation.      5. There is late gadolinium enhancement at the RV insertion site, consistent with non ischemic fibrosis.      6. There is a loculated pericardial effusion inferior and lateral to the left ventricle. There is   thickening and hyperenhancement of the pericardium laterally.      7. There is no intracardiac  thrombus.     8. There is moderate right and small left pleural effusion. The IVC is plethoric.      CONCLUSIONS:    1. Normal biventricular function, LVEF 55% and RVEF 51% with features of restrictive cardiomyopathy.   2. Localized pericardial effusion with pericardial restraint consistent with pericarditis.             Consultations:   Heme/onc         Hospital Course by Problem:      #HFpEF exacerbation  Last echo 9/2017 with EF 60-65%, dilated IVC. Presented with one week of lethargy, poor appetite, nausea/vomiting which then led to 1-2 days of shortness of breath, lower extremity edema, and abdominal distension. She was treated with 2 days of IV diuresis (20mg), now euvolemic.  Spironolactone was started.  --started spironolactone     #Loculated pericardial effusion, pericarditis on cMRI  Loculated pericardial effusion found on cMRI, new from prior. May represent viral process (given history) though presenting symptoms quite similar to presenting symptoms when ultimately DLBCL was diagnosed. Heme onc was consulted, did not feel that this was likely to represent recurrence.  -- colchicine started 3/10. Continue for 4 weeks.  -- repeat cMRI in 1 month, follow up with Dr. Eaton in 1 month     #Elevated transaminases, hepatomegaly  RUQ ultrasound consistent with congestion. LFTs improved with diuresis.    #Hypervolemic hyponatremia  -- continue to trend     #p-afib  #Recent Afib with RVR s/p cardioversion 3/2/2018 at ED  Pafib. Chadsvasc of 2(female and CHF). On apixaban. Cardioversion on Monday***  -Will resume atenolol 25mg bid      Physical Exam on day of Discharge:  Blood pressure 108/83, pulse 133, temperature 98.7  F (37.1  C), temperature source Oral, resp. rate 16, weight 54.3 kg (119 lb 9.6 oz), SpO2 97 %, not currently breastfeeding.  General: ***  HEENT: ***  Neck: ***  Respiratory: ***  Heart/CV: ***  Abdomen/GI: ***  Extremities/MSK: ***  Skin: ***  Neuro: ***  Psych: ***             Pending Results:    ***          Discharge Medications:     Current Discharge Medication List      CONTINUE these medications which have NOT CHANGED    Details   tolterodine (DETROL LA) 4 MG 24 hr capsule       ciprofloxacin (CIPRO) 500 MG tablet Take 1 tablet (500 mg) by mouth 2 times daily for 10 days  Qty: 20 tablet, Refills: 0    Associated Diagnoses: Dysuria      atenolol (TENORMIN) 25 MG tablet Take 1 tablet (25 mg) by mouth 2 times daily  Qty: 60 tablet, Refills: 3    Associated Diagnoses: Atrial tachycardia (H)      apixaban ANTICOAGULANT (ELIQUIS) 5 MG tablet Take 1 tablet (5 mg) by mouth 2 times daily  Qty: 180 tablet, Refills: 3    Associated Diagnoses: Paroxysmal atrial fibrillation (H)      potassium chloride (KLOR-CON) 20 MEQ Packet Take 20 mEq by mouth daily  Qty: 90 packet, Refills: 3    Comments: Give 90 day supply  Associated Diagnoses: Hypokalemia      predniSONE (DELTASONE) 5 MG tablet Take 1 tablet (5 mg) by mouth daily  Qty: 90 tablet, Refills: 2    Associated Diagnoses: 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 (H)      hydroxychloroquine (PLAQUENIL) 200 MG tablet Take 1 tablet (200 mg) by mouth 2 times daily  Qty: 60 tablet, Refills: 5    Associated Diagnoses: 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 (H)      gabapentin (NEURONTIN) 100 MG capsule Take 2 capsules (200 mg) by mouth 3 times daily  Qty: 180 capsule, Refills: 3    Associated Diagnoses: Critical illness myopathy      acetaminophen (TYLENOL) 325 MG tablet Take 2 tablets (650 mg) by mouth every 4 hours as needed for mild pain, fever or headaches    Associated Diagnoses: Diffuse large B-cell lymphoma of extranodal site (H)      calcium polycarbophil (FIBERCON) 625 MG tablet Take 1 tablet by mouth 2 times daily      calcium-vitamin D (CALTRATE) 600-400 MG-UNIT per tablet Take 2 tablets by mouth every morning  Qty: 60 tablet, Refills: 0    Associated Diagnoses: Diffuse large B-cell  "lymphoma, unspecified body region (H)      multivitamin, therapeutic with minerals (THERA-VIT-M) TABS tablet Take 1 tablet by mouth daily  Qty: 30 each, Refills: 0    Associated Diagnoses: Diffuse large B-cell lymphoma, unspecified body region (H)      ascorbic acid 500 MG TABS Take 1 tablet (500 mg) by mouth daily  Qty: 30 tablet, Refills: 0    Associated Diagnoses: Diffuse large B-cell lymphoma, unspecified body region (H)      order for DME Equipment being ordered: BP cuff  Qty: 1 Device, Refills: 1    Associated Diagnoses: Hypertension, unspecified type      HYDROCORTISONE PO Take 100 mg by mouth IV                  Discharge Instructions and Follow-Up:     Discharge Procedure Orders  Home care nursing referral   Referral Type: Home Health Therapies & Aides                     Discharge Disposition:   ***         Condition on Discharge:   Discharge condition: {CONDITION:991380::\"Stable\"}   Code status on discharge: {CODE STATUS:291325}        Date of service: 3/11/2018***    The patient was discussed with  ***.    ***  "

## 2018-03-11 NOTE — PLAN OF CARE
Problem: Patient Care Overview  Goal: Plan of Care/Patient Progress Review  D/I/A: Pt here w/ CHF exacerbation (SOB, LE edema, abdominal distension). She was cardioverted on the 5th in the ED and converted back to a-fib on 3/9 w/ variable rates (90s-140s). K replaced. A-fib, BP stable, RA, independent.  P: Cardioversion Monday.

## 2018-03-11 NOTE — PROGRESS NOTES
[facility], [place]    Sepsis Evaluation Progress Note    Date of Service: 03/11/2018    I was called to see Amelia Michel due to abnormal vital signs triggering the Sepsis SIRS screening alert. She is not known to have an infection.     Physical Exam    Vital Signs:  Temp: 98.3  F (36.8  C) Temp src: Oral BP: (!) 111/92 Pulse: 133 Heart Rate: 105 Resp: 16 SpO2: 96 % O2 Device: None (Room air)      Lab:  Lactic Acid   Date Value Ref Range Status   08/09/2017 1.7 0.7 - 2.1 mmol/L Final       The patient is at baseline mental status.    The rest of their physical exam is significant for unchanged from prior.    Assessment and Plan    The SIRS and exam findings are likely due to atrial fibrillation with RVR, there is no sign of sepsis at this time.    Disposition: The patient will remain on the current unit. We will continue to monitor this patient closely.    Mery Roque MD

## 2018-03-11 NOTE — CONSULTS
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 PEt/CT S/p 6 cycles of R-CHOP most recetnly on 1/15/18 showed pt in CR and no uptake in the interatrial septum. cMRI done here was concerning for complex effusion or loculated effusion and pericarditis. No B symptoms, no obvious evidence of relapse. It is very unlikely that lymphoma relapsed in pericardium, but it cannot be entirely ruled out and has been reported before. Her counts are stable otherwise,Liver enzymes slighlty elevated 2/2 congestion. Hepatomegaly is stable and chronic. and no other evidence of relapse.       Recommendations:  -Send LDH and Uric acid  -Please contact IR for aspiration of pericardial fluid if possible  -Send the pericardial fluid for cytology and flow cytometry (not in formalin)  -Can consider OP restaging imaging CT or PET/CT as an outpatient    Pt seen and discussed with Dr. Silverman who agrees with the plan.    Please don't hesitate to call us with any questions    Fox Salinas MD  Hematology Oncology fellow  [phone]          HPI:     Amelia Michel is a 57 year old female with RA was previously on chronic methotrexate, Afib (diagnosed in May 2017 with cardiac arrest), DLBCL (in remission since Dec), RA, h/o VSD repair who was admitted to Cardiology  service with shortness of breath with exertion and Afib RVR. She presented to ED last week where she was in Afib with RVR to 180s. Given 5mg IV metoprolol x 2 and cardioverted successfully with 100J x1 and was discharegd home.At her PCP visit she reported a 5 lb weight gain, shortness of breath with exertion, lightheadedness with standing and bradycardia with exertion. ROS notable for currently being treated with cipro for bacteruria and some associated diarrhea. Endorses some mild increase in leg swelling. No orthopnea, PNA, cough, syncope, dysuria, constipation. She denies B symptoms, fever, chills, night sweats, LN enlargement, wt loss.     Hem Onc Hx is significant for stage IVB high risk aggressive Diffuse large B cell lymphoma Lymphoma diagnosed in 08/2017 after persistent neutropenia. S/p 6 cycles of R-CHOP (1 R-EPOCH and 5 R-CHOP) and High Dose MTX on Day 15 of cycle 2,4, and 6 due to high IPI but then transition to prophy IT chemo on cycle 4 and 6 due to toxicity with the high dose methotrexate. PET CT s/p post 3 cycles CR1 and repeat PET CT on 1/25/2018 in ongoing CR1.    Review of system: Complete ROS otherwise negative         Past Medical History:     Past Medical History:   Diagnosis Date     Atrial fibrillation (H)      Cancer (H)     lymphoma     Cardiac arrest (H)      Hypertension      Neuropathy      Rheumatoid arthritis(714.0)              Past Surgical History:     Past Surgical History:   Procedure Laterality Date     ANESTHESIA CARDIOVERSION N/A 5/17/2017    Procedure: ANESTHESIA CARDIOVERSION;  ANESTHESIA CARDIOVERSION;  Surgeon: GENERIC ANESTHESIA PROVIDER;  Location: [facility] OR     ARTHRODESIS WRIST  2000    Right wrist     BONE MARROW ASPIRATE &BIOPSY  7/12/2017          FOOT SURGERY      4 left and 2 right     RELEASE CARPAL TUNNEL BILATERAL       REPAIR VENTRICULAR SEPTAL DEFECT  1969             Social History:     Social History     Social History     Marital status:      Spouse  name: Romeo Michel     Number of children: 0     Years of education: N/A     Occupational History      [employer]     Social History Main Topics     Smoking status: Never Smoker     Smokeless tobacco: Never Used     Alcohol use Yes      Comment: Glass of wine two evenings a night     Drug use: No     Sexual activity: Not on file     Other Topics Concern     Parent/Sibling W/ Cabg, Mi Or Angioplasty Before 65f 55m? No     Social History Narrative             Family History:     Family History   Problem Relation Age of Onset     Breast Cancer Mother      Hypertension Mother      Alzheimer Disease Mother      Hypertension Father      Blood Disease Father      Lymphoa     Circulatory Father      A Fib     DIABETES Paternal Grandmother                 Allergies:   .   Allergies   Allergen Reactions     Blood Transfusion Related (Informational Only) Hives     Hives with platelets. Give benadryl premedication.     Metoprolol Other (See Comments)     Pt and  report that metoprolol does not work for her and also reports feeling unwell with this medication. She has been able to tolerate atenolol, which as worked in controlling her HR.      No Clinical Screening - See Comments      Penicillin Allergy Skin Test not performed, see antimicrobial management team progress note 7/5/17.       Penicillins      Tape [Adhesive Tape] Rash             Medications:     Current Facility-Administered Medications   Medication     colchicine tablet 0.6 mg     spironolactone (ALDACTONE) tablet 25 mg     atenolol (TENORMIN) tablet 25 mg     metoprolol (LOPRESSOR) injection 5 mg     naloxone (NARCAN) injection 0.1-0.4 mg     melatonin tablet 1 mg     potassium chloride SA (K-DUR/KLOR-CON M) CR tablet 20-40 mEq     potassium chloride (KLOR-CON) Packet 20-40 mEq     potassium chloride 10 mEq in 100 mL sterile water intermittent infusion (premix)     potassium chloride 10 mEq in 100 mL intermittent infusion with 10 mg  "lidocaine     potassium chloride 20 mEq in 50 mL intermittent infusion     magnesium sulfate 2 g in NS intermittent infusion (PharMEDium or FV Cmpd)     magnesium sulfate 4 g in 100 mL sterile water (premade)     senna-docusate (SENOKOT-S;PERICOLACE) 8.6-50 MG per tablet 1 tablet    Or     senna-docusate (SENOKOT-S;PERICOLACE) 8.6-50 MG per tablet 2 tablet     acetaminophen (TYLENOL) tablet 650 mg     apixaban ANTICOAGULANT (ELIQUIS) tablet 5 mg     ascorbic acid (VITAMIN C) tablet 500 mg     calcium polycarbophil (FIBERCON) tablet 625 mg     calcium-vitamin D (CALTRATE) 600-400 MG-UNIT per tablet 2 tablet     ciprofloxacin (CIPRO) tablet 500 mg     gabapentin (NEURONTIN) capsule 200 mg     hydroxychloroquine (PLAQUENIL) tablet 200 mg     multivitamin, therapeutic with minerals (THERA-VIT-M) tablet 1 tablet     potassium chloride (KLOR-CON) Packet 20 mEq     predniSONE (DELTASONE) tablet 5 mg     tolterodine (DETROL LA) 24 hr capsule 4 mg           Vital signs:  Temp: 98.7  F (37.1  C) Temp src: Oral BP: 105/79   Heart Rate: 119 Resp: 18 SpO2: 94 % O2 Device: None (Room air)     Weight: 54.3 kg (119 lb 11.2 oz)  Estimated body mass index is 25.02 kg/(m^2) as calculated from the following:    Height as of 3/2/18: 1.473 m (4' 10\").    Weight as of this encounter: 54.3 kg (119 lb 11.2 oz).    Physical exam:    Gen: Well built and nourished, not in any acute distress  HEENT: Mucous Membrane Moist, no oral lesions, no pallor, icterus  Neck: supple,   Lymph Nodes: no cervical, axillary LAD   CVS: Regular Rate Rhythm, no murmurs  Resp: CTA, no added sounds  GI: Soft, non-tender, no Hepatospleenomegaly  Extremities: no edema   Skin: no bruises  Neuro: AAOx4. Cranial nerves grossly intact. Strength 5/5 throughout. Sensations grossly intact.          Data:   The labs and imaging were reviewed.      .  Recent Results (from the past 24 hour(s))   CBC with platelets    Collection Time: 03/10/18  6:26 AM   Result Value Ref Range "    WBC 3.8 (L) 4.0 - 11.0 10e9/L    RBC Count 3.59 (L) 3.8 - 5.2 10e12/L    Hemoglobin 13.5 11.7 - 15.7 g/dL    Hematocrit 40.7 35.0 - 47.0 %     (H) 78 - 100 fl    MCH 37.6 (H) 26.5 - 33.0 pg    MCHC 33.2 31.5 - 36.5 g/dL    RDW 13.5 10.0 - 15.0 %    Platelet Count 82 (L) 150 - 450 10e9/L   Comprehensive metabolic panel    Collection Time: 03/10/18  6:26 AM   Result Value Ref Range    Sodium 137 133 - 144 mmol/L    Potassium 3.6 3.4 - 5.3 mmol/L    Chloride 104 94 - 109 mmol/L    Carbon Dioxide 25 20 - 32 mmol/L    Anion Gap 8 3 - 14 mmol/L    Glucose 87 70 - 99 mg/dL    Urea Nitrogen 14 7 - 30 mg/dL    Creatinine 0.65 0.52 - 1.04 mg/dL    GFR Estimate >90 >60 mL/min/1.7m2    GFR Estimate If Black >90 >60 mL/min/1.7m2    Calcium 8.6 8.5 - 10.1 mg/dL    Bilirubin Total 0.8 0.2 - 1.3 mg/dL    Albumin 3.2 (L) 3.4 - 5.0 g/dL    Protein Total 5.9 (L) 6.8 - 8.8 g/dL    Alkaline Phosphatase 143 40 - 150 U/L     (H) 0 - 50 U/L    AST 56 (H) 0 - 45 U/L   Magnesium    Collection Time: 03/10/18  6:26 AM   Result Value Ref Range    Magnesium 2.1 1.6 - 2.3 mg/dL   Lactic acid level STAT for sepsis protocol    Collection Time: 03/10/18  8:04 AM   Result Value Ref Range    Lactate for Sepsis Protocol 0.9 0.4 - 1.9 mmol/L           Results for orders placed or performed during the hospital encounter of 18   MRI Cardiac w/contrast    Narrative                                                  [facility]                                                     CMR Report      MRN:                  [id number]                                  Name:          FIDELIA MIDDLETON                                  :                  1960                                  Scan Date:   2018 10:22:59                                    Signed by Tere Villarreal ([id number]) 2018-Mar-08 17:55:36.    SUMMARY    ==========================================================================================================    Clinical history: 57-year-old woman with history of diffuse B-cell lymphoma, CMR to evaluate cardiac  involvement.     Comparison CMR: 07/24/2017  Suboptimal image quality due to cardiac gating artifact in the setting of frequent premature ventricular  contractions.     1. The left ventricle is normal in cavity size and wall thickness. The global systolic function is normal.  The LVEF is 55%. There is tethering of the inferior and lateral segments.     2. The right ventricle is normal in cavity size. The global systolic function is normal. The RVEF is 51%.     3. Both atria are severely dilated in size.    4. There is tricuspid, mitral and pulmonary regurgitation.     5. There is late gadolinium enhancement at the RV insertion site, consistent with non ischemic fibrosis.     6. There is a loculated pericardial effusion inferior and lateral to the left ventricle. There is   thickening and hyperenhancement of the pericardium laterally.     7. There is no intracardiac thrombus.    8. There is moderate right and small left pleural effusion. The IVC is plethoric.     CONCLUSIONS:    1. Normal biventricular function, LVEF 55% and RVEF 51% with features of restrictive cardiomyopathy.   2. Localized pericardial effusion with pericardial restraint consistent with pericarditis.      When compared to prior CMR from 07/24/2017,  pericarditis is now present. Features of restrictive  cardiomyopathy have been present since 2015, also seen in 2017.    CORE EXAM   ==========================================================================================================    MEASUREMENTS   ----------------------------------------------------------------------------------------      VOLUMETRIC ANALYSIS       ----------------------------------------------  .-----------------------------------------------------------.                     LV     Reference  RV      Reference   +------+-----------+-------+-----------+--------+-----------+   EDV   ml         118.1   ()  162.16   ()          ml/m^2      78.7   (56-90)    108.1   (53-90)     ESV   ml         53.17   (22-59)    80.08   (15-68)           ml/m^2      35.4   (14-33)     53.4   (11-37)     CO    L/min        3.9               4.92                     L/min/m^2    2.6                3.3               MASS                                                    SV    ml         64.93   ()   82.08   ()          ml/m^2      43.3   (37-62)     54.7   (36-60)     EF    %          54.98   (59-77)    50.62   (55-79)    '------+-----------+-------+-----------+--------+-----------'            CARDIAC OUTPUT HR:  60 BPM      LV DIMENSIONS       ----------------------------------------------          WALL THICKNESS - ANTEROSEPTAL:  0.7 cm          WALL THICKNESS - INFEROLATERAL:  0.7 cm          LV AYDE:  4.9 cm          LV ESD:  3.6 cm        LA DIMENSIONS (LV SYSTOLE)       ----------------------------------------------          DIAMETER:  4.9 cm        AORTIC ROOT DIMENSIONS       ----------------------------------------------          DIAMETER - SINUS OF VALSALVA:  2.2 cm          AORTIC ROOT SIZE:  Normal       ANATOMY   ----------------------------------------------------------------------------------------      LEFT VENTRICLE       ----------------------------------------------          WALL THICKNESS:  Normal           CAVITY SIZE:  Normal         RIGHT VENTRICLE       ----------------------------------------------          WALL THICKNESS:  Normal           CONTRACTILITY:  Normal           CAVITY SIZE:  Normal         LEFT ATRIUM       ----------------------------------------------          CAVITY SIZE:  SEVERELY ENLARGED         RIGHT ATRIUM       ----------------------------------------------           CAVITY SIZE:  SEVERELY ENLARGED         PERICARDIUM       ----------------------------------------------          THICKENED           EFFUSION:  SMALL           LOCULATED?:  Yes         PLEURAL EFFUSION       ----------------------------------------------   SMALL LEFT, MODERATE RIGHT       VALVES   ----------------------------------------------------------------------------------------      AORTIC VALVE       ----------------------------------------------          AORTIC VALVE LEAFLETS:  Trileaflet         MITRAL VALVE       ----------------------------------------------          MITRAL REGURGITATION:  Present         TRICUSPID VALVE       ----------------------------------------------          TRICUSPID REGURGITATION:  Present         PULMONIC VALVE       ----------------------------------------------          PULMONIC REGURGITATION:  Present       17 SEGMENT   ----------------------------------------------------------------------------------------  .------------------------------------------------------------------------------------------.   Segments            Wall Motion   Hyperenhancement  Stress Perfusion  Interpretation   +--------------------+--------------+------------------+------------------+----------------+   Base Anterior       Normal/Hyper  None                                Normal            Base Anteroseptal   Normal/Hyper  1-25%                               Non-CAD Scar      Base Inferoseptal   Normal/Hyper  1-25%                               Non-CAD Scar      Base Inferior       Normal/Hyper  None                                Normal            Base Inferolateral  Normal/Hyper  None                                Normal            Base Anterolateral  Normal/Hyper  None                                Normal            Mid Anterior        Normal/Hyper  None                                Normal            Mid Anteroseptal    Normal/Hyper   None                                Normal            Mid Inferoseptal    Normal/Hyper  None                                Normal            Mid Inferior        Normal/Hyper  None                                Normal            Mid Inferolateral   Normal/Hyper  None                                Normal            Mid Anterolateral   Normal/Hyper  None                                Normal            Apical Anterior     Normal/Hyper  None                                Normal            Apical Septal       Normal/Hyper  None                                Normal            Apical Inferior     Normal/Hyper  None                                Normal            Apical Lateral      Normal/Hyper  None                                Normal            [place]                Normal/Hyper  None                                Normal           +--------------------+--------------+------------------+------------------+----------------+   RV Segments         Wall Motion   Hyperenhancement  Stress Perfusion  Interpretation   +--------------------+--------------+------------------+------------------+----------------+   RV Basal Anterior   Normal/Hyper  None                                Normal            RV Basal Inferior   Normal/Hyper  None                                Normal            RV Mid              Normal/Hyper  None                                Normal            RV Apical           Normal/Hyper  None                                Normal           '--------------------+--------------+------------------+------------------+----------------'        FINDINGS       ----------------------------------------------          INFARCT/SCAR SIZE:  2 %      VASCULAR   ==========================================================================================================      SCAN INFO    ==========================================================================================================    GENERAL   ----------------------------------------------------------------------------------------      SEDATION       ----------------------------------------------          SEDATION USED?:  No         CONTRAST AGENT       ----------------------------------------------          TYPE:  Gadavist           VOLUME ADMINISTERED:  7.5 ml          DOSAGE FOR 0.5M:  0.07 mmol/kg          SERUM CREATININE:  0.66 sCr          GFR:  98.11 ml/min/1.73m^2          CREATININE DATE:  2018-03-07 00:00:00         VITALS       ----------------------------------------------          HEIGHT:  58 in          HEIGHT:  147.32 cm          WEIGHT:  125.99 lbs          WEIGHT:  57.15 kgs          BSA:  1.5 m^2        PULSE SEQUENCE       ----------------------------------------------          PULSE SEQUENCES:  Single-Shot SSFP, IR GRE - Segmented, SSFP Cine         SETUP       ----------------------------------------------          TYPE:  Clinical           INPATIENT:  Yes           INCOMPLETE SCAN:  No           REASON(S) FOR SCAN:  Abnormal ECG, Atrial arrhyhtmia, Congenital Heart Disease           REFERRING PHYSICIAN:  TRINO HEALY           ATTENDING PHYSICIAN:  SHASHI BENITEZ           TECHNOLOGIST:  Henrietta Ruiz    US Abdomen Complete w Doppler Complete    Narrative    EXAMINATION: US ABDOMEN COMPLETE WITH DOPPLER COMPLETE, 3/8/2018 8:52  AM     COMPARISON: 6/5/2017 and 1/15/2018.    HISTORY: hepatomegaly, h/o DLBCL;     TECHNIQUE: The abdomen was scanned in standard fashion with  specialized ultrasound transducer(s) using both gray-scale, color  Doppler, and spectral flow techniques.    Findings:    Liver: Decreased hepatic echogenicity, liver measures 20 cm.     Extrahepatic portal vein flow is antegrade, measuring 26 cm/sec.  Right portal vein flow is antegrade, measuring 9.7 cm/sec.  Left portal vein flow is antegrade,  measuring 18 cm/sec.    Flow in the hepatic artery is towards the liver and:  99 cm/sec peak systolic  0.78 resistive index.     The splenic vein is patent and flow is towards the liver.  The left,  middle, and right hepatic veins are patent with flow towards the IVC.  The IVC is patent with flow towards the heart.   The visualized aorta  is not dilated.    Gallbladder: No cholelithiasis/cystitis. Mild gallbladder wall  circumferential thickening likely secondary to ascites or liver  disease.    Bile Ducts: Both the intra- and extrahepatic biliary system are of  normal caliber.  The common bile duct measures 5 mm in diameter.    Pancreas: Visualized portions of the pancreas are unremarkable.     Kidneys: Both kidneys are of normal echotexture, without mass or  hydronephrosis.   The craniocaudal dimensions are: right- 10.4 cm,  left- 10.8 cm.    Spleen: The spleen measures 11.6 cm in sagittal dimension.    Fluid: Mild ascites, small right pleural effusion      Impression    Impression:   1.  Normal hepatic Doppler assessment.  2.  Mildly decreased hepatic echogenicity diffusely with slight  enlargement, which may be related to congestive hepatopathy. No focal  hepatic masses.  3.  Mild ascites and right pleural effusion.    I have personally reviewed the examination and initial interpretation  and I agree with the findings.    MD Fox BLUNT MD

## 2018-03-11 NOTE — PLAN OF CARE
Problem: Patient Care Overview  Goal: Plan of Care/Patient Progress Review  Outcome: No Change    D: Pt admitted 3/7 with SOB, LE edema, and abdominal distension. PMH: DLBCL, RA, afib, cardiac arrest.     I/A: No acute events overnight. A&Ox4. Vital signs stable on RA. Monitor shows afib/SVT with rates 90's-130's. Patient asymptomatic. Denied any pain or SOB. Voiding with adequate UOP. No BM overnight. Continues on cardiac diet. Up independently with walker. Appeared to rest comfortably between cares.     P: Continue to monitor. Notify Cards 1 with changes/concerns.

## 2018-03-11 NOTE — PLAN OF CARE
Problem: Patient Care Overview  Goal: Individualization & Mutuality  Outcome: No Change    D. Pt was admitted 3/7 due MONTALVO, lower extremity edema, and abdominal distension in the setting of recent lethargy and UTI.  Afib RVR since yesterday am.  RY=[id number]'s. Pt is asymptomatic with RVR. Pt is on PO Metoprolol and it was given ~2000. Pt also has a PRN Metoprolol for HR>130 that lasts more than 10min.  Offered Metoprolol IV to pt and pt stated that Metoprolol IV does not really work for her and chose not to take it now.  Spoke to Dr. Thomason and MD ordered to call if HR >140 for if pt is asymptomatic with RVR. Ok to hold IV metoprol for now and monitor pt.  Appetite is good. Adequate UO.  +Bm during the day shift.   I. Keeping a strict I and O's.   A. Pt is stable.   P. Continue monitoring pt.

## 2018-03-12 ENCOUNTER — APPOINTMENT (OUTPATIENT)
Dept: CARDIOLOGY | Facility: CLINIC | Age: 58
DRG: 314 | End: 2018-03-12
Attending: INTERNAL MEDICINE
Payer: COMMERCIAL

## 2018-03-12 ENCOUNTER — ANESTHESIA EVENT (OUTPATIENT)
Dept: SURGERY | Facility: CLINIC | Age: 58
DRG: 314 | End: 2018-03-12
Payer: COMMERCIAL

## 2018-03-12 ENCOUNTER — ANESTHESIA (OUTPATIENT)
Dept: SURGERY | Facility: CLINIC | Age: 58
DRG: 314 | End: 2018-03-12
Payer: COMMERCIAL

## 2018-03-12 LAB
ALBUMIN SERPL-MCNC: 3.2 G/DL (ref 3.4–5)
ALP SERPL-CCNC: 124 U/L (ref 40–150)
ALT SERPL W P-5'-P-CCNC: 89 U/L (ref 0–50)
ANION GAP SERPL CALCULATED.3IONS-SCNC: 7 MMOL/L (ref 3–14)
ANION GAP SERPL CALCULATED.3IONS-SCNC: 8 MMOL/L (ref 3–14)
AST SERPL W P-5'-P-CCNC: 30 U/L (ref 0–45)
BILIRUB SERPL-MCNC: 0.6 MG/DL (ref 0.2–1.3)
BUN SERPL-MCNC: 16 MG/DL (ref 7–30)
BUN SERPL-MCNC: 22 MG/DL (ref 7–30)
CALCIUM SERPL-MCNC: 8.8 MG/DL (ref 8.5–10.1)
CALCIUM SERPL-MCNC: 9 MG/DL (ref 8.5–10.1)
CHLORIDE SERPL-SCNC: 102 MMOL/L (ref 94–109)
CHLORIDE SERPL-SCNC: 106 MMOL/L (ref 94–109)
CO2 SERPL-SCNC: 26 MMOL/L (ref 20–32)
CO2 SERPL-SCNC: 27 MMOL/L (ref 20–32)
CREAT SERPL-MCNC: 0.71 MG/DL (ref 0.52–1.04)
CREAT SERPL-MCNC: 0.71 MG/DL (ref 0.52–1.04)
ERYTHROCYTE [DISTWIDTH] IN BLOOD BY AUTOMATED COUNT: 13.7 % (ref 10–15)
GFR SERPL CREATININE-BSD FRML MDRD: 85 ML/MIN/1.7M2
GFR SERPL CREATININE-BSD FRML MDRD: 85 ML/MIN/1.7M2
GLUCOSE SERPL-MCNC: 111 MG/DL (ref 70–99)
GLUCOSE SERPL-MCNC: 78 MG/DL (ref 70–99)
HCT VFR BLD AUTO: 41.7 % (ref 35–47)
HGB BLD-MCNC: 13.5 G/DL (ref 11.7–15.7)
INTERPRETATION ECG - MUSE: NORMAL
MCH RBC QN AUTO: 37.5 PG (ref 26.5–33)
MCHC RBC AUTO-ENTMCNC: 32.4 G/DL (ref 31.5–36.5)
MCV RBC AUTO: 116 FL (ref 78–100)
PLATELET # BLD AUTO: 71 10E9/L (ref 150–450)
POTASSIUM SERPL-SCNC: 3.8 MMOL/L (ref 3.4–5.3)
POTASSIUM SERPL-SCNC: 3.9 MMOL/L (ref 3.4–5.3)
PROT SERPL-MCNC: 5.9 G/DL (ref 6.8–8.8)
RBC # BLD AUTO: 3.6 10E12/L (ref 3.8–5.2)
SODIUM SERPL-SCNC: 137 MMOL/L (ref 133–144)
SODIUM SERPL-SCNC: 140 MMOL/L (ref 133–144)
WBC # BLD AUTO: 3.4 10E9/L (ref 4–11)

## 2018-03-12 PROCEDURE — 92960 CARDIOVERSION ELECTRIC EXT: CPT | Performed by: NURSE PRACTITIONER

## 2018-03-12 PROCEDURE — 99222 1ST HOSP IP/OBS MODERATE 55: CPT | Mod: GC | Performed by: INTERNAL MEDICINE

## 2018-03-12 PROCEDURE — 80053 COMPREHEN METABOLIC PANEL: CPT | Performed by: INTERNAL MEDICINE

## 2018-03-12 PROCEDURE — 5A2204Z RESTORATION OF CARDIAC RHYTHM, SINGLE: ICD-10-PCS | Performed by: NURSE PRACTITIONER

## 2018-03-12 PROCEDURE — 25000132 ZZH RX MED GY IP 250 OP 250 PS 637: Performed by: INTERNAL MEDICINE

## 2018-03-12 PROCEDURE — 93010 ELECTROCARDIOGRAM REPORT: CPT | Performed by: INTERNAL MEDICINE

## 2018-03-12 PROCEDURE — 37000008 ZZH ANESTHESIA TECHNICAL FEE, 1ST 30 MIN

## 2018-03-12 PROCEDURE — 25000125 ZZHC RX 250: Performed by: HOSPITALIST

## 2018-03-12 PROCEDURE — 85027 COMPLETE CBC AUTOMATED: CPT | Performed by: INTERNAL MEDICINE

## 2018-03-12 PROCEDURE — 93005 ELECTROCARDIOGRAM TRACING: CPT

## 2018-03-12 PROCEDURE — 99231 SBSQ HOSP IP/OBS SF/LOW 25: CPT | Mod: 25 | Performed by: INTERNAL MEDICINE

## 2018-03-12 PROCEDURE — 36415 COLL VENOUS BLD VENIPUNCTURE: CPT | Performed by: INTERNAL MEDICINE

## 2018-03-12 PROCEDURE — 92960 CARDIOVERSION ELECTRIC EXT: CPT

## 2018-03-12 PROCEDURE — 25000125 ZZHC RX 250: Performed by: INTERNAL MEDICINE

## 2018-03-12 PROCEDURE — 21400006 ZZH R&B CCU INTERMEDIATE UMMC

## 2018-03-12 PROCEDURE — 25000125 ZZHC RX 250: Performed by: NURSE ANESTHETIST, CERTIFIED REGISTERED

## 2018-03-12 RX ORDER — POTASSIUM CHLORIDE 750 MG/1
20 TABLET, EXTENDED RELEASE ORAL
Status: CANCELLED | OUTPATIENT
Start: 2018-03-12

## 2018-03-12 RX ORDER — POTASSIUM CHLORIDE 750 MG/1
40 TABLET, EXTENDED RELEASE ORAL
Status: CANCELLED | OUTPATIENT
Start: 2018-03-12

## 2018-03-12 RX ORDER — ONDANSETRON 4 MG/1
4 TABLET, FILM COATED ORAL EVERY 6 HOURS PRN
Status: DISCONTINUED | OUTPATIENT
Start: 2018-03-12 | End: 2018-03-13 | Stop reason: HOSPADM

## 2018-03-12 RX ADMIN — MULTIPLE VITAMINS W/ MINERALS TAB 1 TABLET: TAB at 14:17

## 2018-03-12 RX ADMIN — SPIRONOLACTONE 25 MG: 25 TABLET ORAL at 08:22

## 2018-03-12 RX ADMIN — APIXABAN 5 MG: 5 TABLET, FILM COATED ORAL at 08:21

## 2018-03-12 RX ADMIN — APIXABAN 5 MG: 5 TABLET, FILM COATED ORAL at 19:41

## 2018-03-12 RX ADMIN — POTASSIUM CHLORIDE 20 MEQ: 1.5 POWDER, FOR SOLUTION ORAL at 08:21

## 2018-03-12 RX ADMIN — Medication 2 TABLET: at 14:17

## 2018-03-12 RX ADMIN — ATENOLOL 25 MG: 25 TABLET ORAL at 19:41

## 2018-03-12 RX ADMIN — CALCIUM POLYCARBOPHIL 625 MG: 625 TABLET, FILM COATED ORAL at 14:17

## 2018-03-12 RX ADMIN — PREDNISONE 5 MG: 5 TABLET ORAL at 08:21

## 2018-03-12 RX ADMIN — OXYCODONE HYDROCHLORIDE AND ACETAMINOPHEN 500 MG: 500 TABLET ORAL at 14:17

## 2018-03-12 RX ADMIN — COLCHICINE 0.6 MG: 0.6 TABLET, FILM COATED ORAL at 08:21

## 2018-03-12 RX ADMIN — GABAPENTIN 200 MG: 100 CAPSULE ORAL at 08:22

## 2018-03-12 RX ADMIN — Medication 35 MG: at 12:30

## 2018-03-12 RX ADMIN — HYDROXYCHLOROQUINE SULFATE 200 MG: 200 TABLET, FILM COATED ORAL at 08:22

## 2018-03-12 RX ADMIN — ATENOLOL 25 MG: 25 TABLET ORAL at 08:21

## 2018-03-12 RX ADMIN — POTASSIUM CHLORIDE 20 MEQ: 1.5 POWDER, FOR SOLUTION ORAL at 00:10

## 2018-03-12 RX ADMIN — HYDROXYCHLOROQUINE SULFATE 200 MG: 200 TABLET, FILM COATED ORAL at 19:41

## 2018-03-12 RX ADMIN — CIPROFLOXACIN HYDROCHLORIDE 500 MG: 500 TABLET, FILM COATED ORAL at 08:21

## 2018-03-12 RX ADMIN — CIPROFLOXACIN HYDROCHLORIDE 500 MG: 500 TABLET, FILM COATED ORAL at 19:41

## 2018-03-12 RX ADMIN — GABAPENTIN 200 MG: 100 CAPSULE ORAL at 19:41

## 2018-03-12 RX ADMIN — GABAPENTIN 200 MG: 100 CAPSULE ORAL at 14:17

## 2018-03-12 RX ADMIN — POTASSIUM CHLORIDE 20 MEQ: 1.5 POWDER, FOR SOLUTION ORAL at 09:51

## 2018-03-12 RX ADMIN — ONDANSETRON HYDROCHLORIDE 4 MG: 4 TABLET, FILM COATED ORAL at 22:53

## 2018-03-12 RX ADMIN — TOLTERODINE 4 MG: 4 CAPSULE, EXTENDED RELEASE ORAL at 08:22

## 2018-03-12 ASSESSMENT — ENCOUNTER SYMPTOMS: DYSRHYTHMIAS: 1

## 2018-03-12 ASSESSMENT — NEW YORK HEART ASSOCIATION (NYHA) CLASSIFICATION: NYHA FUNCTIONAL CLASS: I

## 2018-03-12 NOTE — PROGRESS NOTES
Completed follow up with pt from last week.  Pt has received her EAP brochure and was asking for assistance with a renewed short term disability parking certificate with her hospitalization.  SW completed this with Cards 1 team and gave form to pt to follow up with the DM[employer].  Will send note to PCP that this was updated.    HOMERO Larsen, APSW  [address]   Phone: [phone]  Pager: [phone]  Unit: [phone]

## 2018-03-12 NOTE — PROGRESS NOTES
[facility] Cardiology Progress Note          Assessment and Plan:   Assessment: 58yo F with PMH of DLBCL in remission (last PET scan 1/15/2018), RA, atrial tachyarrythmias, history of Afib and prior cardiac arrest (which eventually led to her diagnosis of DLBCL) who presents with shortness of breath on exertion, lower extremity edema, and abdominal distension in the setting of recent lethargy and UTI      Today  -- No major changes. Cardioversion today (3/12/18)    Plan:  #HFpEF exacerbation  Last echo 9/2017 with EF 60-65%, dilated IVC. Presented with one week of lethargy, poor appetite, nausea/vomiting which then led to 1-2 days of shortness of breath, lower extremity edema, and abdominal distension. Presented to ED on 3/2 in Afib with RVR, s/p cardioversion.  -- diuresis with 20mg IV lasix 3/8 and 3/9. Holding further diuresis. Euvolemic  --started spironolactone    #Loculated pericardial effusion, pericarditis on cMRI  Loculated pericardial effusion found on cMRI, new from prior. May represent viral process (given history) though presenting symptoms quite similar to presenting symptoms when ultimately DLBCL was diagnosed. Recurrence less likely but can't be completely ruled out per heme /onc  -- appreciate heme/onc recs  -- colchicine started 3/10  -- continue colchicine x 4 weeks  -- repeat cMRI in 1 month, follow up with Dr. Eaton in 1 month    #Elevated transaminases, hepatomegaly  RUQ ultrasound consistent with congestion. LFTs improved with diuresis.  --trend liver enzymes    #Hypervolemic hyponatremia  -- continue to trend    #p-afib  #Recent Afib with RVR s/p cardioversion 3/2/2018 at ED  Pafib. Chadsvasc of 2(female and CHF). On apixaban.  -Will resume atenolol 25mg bid for now  -Cardioversion today  - on apixaban    #Recent UTI  Completed cipro course    #RA: continue PTA plaquenil, prednisone 5mg   #Pain: continue gabapentin, APAP    FEN: cardiac diet  Prophylaxis: ambulate  Code StatuS:  Full  Disposition: 1-2 days    Discussed with Dr. Mcguire.     Kelly Smith  Internal Medicine PGY2  [phone]    CARDIOLOGY STAFF  Patient seen and examined by me.  History and physical examination discussed with Dr. Smith whose note reflects our joint assessment and recommendation/plans.  I am assuming the Cardiology 1 service today.  57 year old woman with diffuse large B-cell lymphoma and atrial fibrillation.  She was admitted with MONTALVO and LE edema.  On exam, cor irreg irreg; Lungs clear.  She had direct current cardioversion planned for today that was performed successfully.  Richy Mcguire          Interval History:   No events overnight. Continues to be in Afib, rates 130s. Feels palpitations/pounding in her head. No shortness of breath, chest pain.         Medications:     Current Facility-Administered Medications   Medication     colchicine tablet 0.6 mg     sodium chloride (PF) 0.9% PF flush 3 mL     spironolactone (ALDACTONE) tablet 25 mg     atenolol (TENORMIN) tablet 25 mg     metoprolol (LOPRESSOR) injection 5 mg     naloxone (NARCAN) injection 0.1-0.4 mg     melatonin tablet 1 mg     potassium chloride SA (K-DUR/KLOR-CON M) CR tablet 20-40 mEq     potassium chloride (KLOR-CON) Packet 20-40 mEq     potassium chloride 10 mEq in 100 mL sterile water intermittent infusion (premix)     potassium chloride 10 mEq in 100 mL intermittent infusion with 10 mg lidocaine     potassium chloride 20 mEq in 50 mL intermittent infusion     magnesium sulfate 2 g in NS intermittent infusion ([facility] or [facility])     magnesium sulfate 4 g in 100 mL sterile water (premade)     senna-docusate (SENOKOT-S;PERICOLACE) 8.6-50 MG per tablet 1 tablet    Or     senna-docusate (SENOKOT-S;PERICOLACE) 8.6-50 MG per tablet 2 tablet     acetaminophen (TYLENOL) tablet 650 mg     apixaban ANTICOAGULANT (ELIQUIS) tablet 5 mg     ascorbic acid (VITAMIN C) tablet 500 mg     calcium polycarbophil (FIBERCON) tablet 625 mg      calcium-vitamin D (CALTRATE) 600-400 MG-UNIT per tablet 2 tablet     ciprofloxacin (CIPRO) tablet 500 mg     gabapentin (NEURONTIN) capsule 200 mg     hydroxychloroquine (PLAQUENIL) tablet 200 mg     multivitamin, therapeutic with minerals (THERA-VIT-M) tablet 1 tablet     potassium chloride (KLOR-CON) Packet 20 mEq     predniSONE (DELTASONE) tablet 5 mg     tolterodine (DETROL LA) 24 hr capsule 4 mg             Physical Exam:   Vitals were reviewed  Blood pressure 95/68, pulse 61, temperature 97.8  F (36.6  C), temperature source Oral, resp. rate 16, weight 52.9 kg (116 lb 11.2 oz), SpO2 99 %, not currently breastfeeding.    Physical Exam:   General: AAOx3, NAD  HEENT: MMM, PERRLA, EOM intact  CV: KRISTY heard best at LUSB, tachycardic, irregularly irregular, normal S1S2  Resp: Decreased breath sounds at right lung base  Abd: Soft, non-tender, BS+, + hepatomegaly  Extremities: Radial and pedal pulses intact and symmetric, no pedal edema  Neuro: No lateralizing symptoms or focal neurologic deficits         Data:   ROUTINE LABS (Last four results)  CMP    Recent Labs  Lab 03/12/18  0709 03/11/18  2330 03/11/18  0724 03/10/18  0626 03/09/18  0626    137 139 137 136   POTASSIUM 3.9 3.8 3.9 3.6 3.5   CHLORIDE 106 102 106 104 104   CO2 26 27 24 25 23   ANIONGAP 8 7 9 8 9   GLC 78 111* 87 87 90   BUN 16 22 17 14 18   CR 0.71 0.71 0.65 0.65 0.71   GFRESTIMATED 85 85 >90 >90 84   GFRESTBLACK >90 >90 >90 >90 >90   VIKTORIYA 9.0 8.8 8.7 8.6 8.4*   MAG  --   --   --  2.1 1.6   PROTTOTAL 5.9*  --  5.7* 5.9* 6.0*   ALBUMIN 3.2*  --  3.2* 3.2* 3.4   BILITOTAL 0.6  --  0.6 0.8 0.7   ALKPHOS 124  --  132 143 159*   AST 30  --  39 56* 80*   ALT 89*  --  106* 136* 172*     CBC    Recent Labs  Lab 03/12/18  0709 03/11/18  0724 03/10/18  0626 03/09/18  0626   WBC 3.4* 3.3* 3.8* 4.2   RBC 3.60* 3.57* 3.59* 3.40*   HGB 13.5 13.5 13.5 12.7   HCT 41.7 41.1 40.7 38.8   * 115* 113* 114*   MCH 37.5* 37.8* 37.6* 37.4*   MCHC 32.4 32.8 33.2  32.7   RDW 13.7 13.7 13.5 13.7   PLT 71* 72* 82* 76*     INR    Recent Labs  Lab 03/07/18  1621   INR 3.42*     Arterial Blood GasNo lab results found in last 7 days.

## 2018-03-12 NOTE — ANESTHESIA CARE TRANSFER NOTE
Patient: Amelia Michel    Procedure(s):      Diagnosis: * No pre-op diagnosis entered *  Diagnosis Additional Information: No value filed.    Anesthesia Type:   No value filed.     Note:  Airway :Nasal Cannula  Patient transferred to:PACU  Comments: Rn at  Bedside, BP issues discussed, Dr Zaman present till end of case.Handoff Report: Identifed the Patient, Identified the Reponsible Provider, Reviewed the pertinent medical history, Discussed the surgical course, Reviewed Intra-OP anesthesia mangement and issues during anesthesia, Set expectations for post-procedure period and Allowed opportunity for questions and acknowledgement of understanding      Vitals: (Last set prior to Anesthesia Care Transfer)    CRNA VITALS  3/12/2018 1232 - 3/12/2018 1302      3/12/2018             NIBP: (!)  83/61     Dr Zaman at bedside,no intervention for BP, NP at beside and aware of low BP    Ht Rate: 61    SpO2: 99 %    Resp Rate (observed): 14    EKG: Atrial fibrillation                Electronically Signed By: ELIZABETH Lisa CRNA  March 12, 2018  1:02 PM

## 2018-03-12 NOTE — PLAN OF CARE
Problem: Patient Care Overview  Goal: Plan of Care/Patient Progress Review  Outcome: Therapy, progress toward functional goals as expected  Kept NPO except medications for Cardioversion. AM K+ 3.9, received scheduled K-lupe 20 meq po plus extra 20 meq per Protocol. /85 and in Atrial Fib when sent to EKG Lab, per EKG RN report sedated by Anesthesia and successful CV with one 150 Joule shock, upon return to  has been in SD w/ rare PAC/ WAP high 50's to low 60's, BP also lower, now 93/66 (map=75). Has voided post procedure, SBA to BR. Slight nausea after eating lunch, possibly due to sedation given, given saltines & resting, declined any other intervention. Refer to flow sheets for full assessments, freq VS, labs etc.

## 2018-03-12 NOTE — PLAN OF CARE
Problem: Patient Care Overview  Goal: Individualization & Mutuality  Outcome: No Change    D. Pt was admitted 3/7 due to MONTALVO, lower extremity edema, and abdominal distension. Asymptomatic Afib RVR (HR= trent 130's to 140) this evening. MD was notified. EKG was done.  Pt was given a one time Lasix 20mg. Pt made ~1100cc UO following IV Lasix.  HR also dropped to 80-90's/Afib.  Notified Dr. Frost. MD ordered to check BMP at midnight.   Appetite is good. Adequate UO. Soft Bms.   I. Keeping a strict I and O's.   A. Pt is stable.  Pt stated that she feels better now that her is not in RVR.   P. NPO after midnight for CV tomorrow.

## 2018-03-12 NOTE — PROGRESS NOTES
[facility]  Patient is currently open to home care services with [facility].  The patient is currently receiving RN services.  [facility]  and team have been notified of patient admission.  [facility] liaison will continue to follow patient during stay. Her certification has ended, so if appropriate please provide orders for Start of Care and enter Face to Face document at time of discharge.    Thank you  Miguelina José RN, BSN  [facility] Liaison  [phone]

## 2018-03-12 NOTE — ANESTHESIA POSTPROCEDURE EVALUATION
Patient: Amelia Michel    Procedure(s):      Diagnosis:* No pre-op diagnosis entered *  Diagnosis Additional Information: No value filed.    Anesthesia Type:  General    Note:  Anesthesia Post Evaluation    Patient location during evaluation: Echo Lab  Patient participation: Able to fully participate in evaluation  Level of consciousness: awake and alert  Pain management: adequate  Airway patency: patent  Cardiovascular status: acceptable and hypotensive  Respiratory status: acceptable  Hydration status: acceptable  PONV: none     Anesthetic complications: None    Comments: Patient's mean arterial blood pressure measurements remained stable at 65-75 mmHg for 15-20 minutes following cardioversion.  The patient's heart rate remained 60-65 beats per minute.  The patient denies symptomatology associated with mildly low blood pressure readings.  Patient transferred back to monitored floor.  Cardiology made aware.          Last vitals:  Vitals:    03/12/18 1252 03/12/18 1302 03/12/18 1312   BP: (!) 88/66 (!) 84/68 93/70   Pulse: 63 62 61   Resp: 16 16 16   Temp:      SpO2: 96% 98% 97%         Electronically Signed By: Brian Zaman MD  March 12, 2018  1:18 PM

## 2018-03-12 NOTE — PLAN OF CARE
Problem: Patient Care Overview  Goal: Plan of Care/Patient Progress Review  D: AFib w/RVR  LE edema and MONTALVO    I: Monitored vitals and assessed pt status.   Changed: NPO for cardioversion  Running: PIV SL'd  PRN: none    A: A0x4. VSS, on RA. Monitor AFib 70-140s with up to 2 PVC/min and rare couplet. Denies c/o pain. K+ replaced per protocol. K= 3.8 (20 meq PO) Good UO. Appeared to sleep well.  I/O this shift:  In: -   Out: 700 [Urine:700]    Temp:  [98.2  F (36.8  C)-98.7  F (37.1  C)] 98.2  F (36.8  C)  Heart Rate:  [] 79  Resp:  [16-18] 16  BP: (105-113)/(78-92) 105/82  SpO2:  [96 %-97 %] 97 %      P: Continue to monitor Pt status and report changes to treatment team. Plan for cardioversion today.

## 2018-03-12 NOTE — PROVIDER NOTIFICATION
Time: 2000  Notified: Dr. Frost ([id number])  Reason: Afib/Flutter (AH=961's). Pt is asymptomatic. Pt to receive scheduled Atenolol now.   MD ordered: stat EKG. Monitory HR following Atenolol.

## 2018-03-12 NOTE — OP NOTE
CARDIOVERSION    PROCEDURE:  direct current cardioversion  PROCEDURE DATE: 3/12/2018     Pre-procedure diagnosis:  Atrial fibrillation  Post-procedure diagnosis: s/p direct current cardioversion  Complications:  none    BRIEF CLINICAL HISTORY:  Ms. Michel is a 56 year old female who has a past medical history significant for VSD s/p repair 1969, RA, HTN, insomnia, atrial tachyarrhythmias, cardiac arrest, and DLBCL.She was recently admitted with with MONTALVO, lower extremity edema, and abdominal distension in the setting of recent lethargy C/w HFpEF exacerbation. She was also found to have a UTI. CMRI performed and showed Loculated pericardial effusion. RUQ ultrasound consistent with congestion. LFTs improved with diuresis. She went into AF during this hospitalization, and she now presents for DCCV.  Of note, she had DCCV on 3/3/18 in the [facility].      PROCEDURE:  The patient arrived at the Echo Laboratory in a fasting, non-sedated state.  Informed consent was previously obtained from patient, who understood indications, risks, and benefits of the procedure.  Patient on Eliquis uninterrupted for > 4 weeks.  With help from Anesthesia, patient was deeply sedated.  150J of synchronized biphasic shock was delivered through the cardiac monitor, and the patient was successfully converted to normal sinus rhythm.  After sedation wore off, patient awoke and remained neurologically intact.  The patient tolerated the procedure well from a hemodynamic and respiratory standpoint without any complications. Her blood pressures were slightly soft, but not symptomatic. Updated Cards 1 team. She was observed in the Echo Laboratory and then transferred back to inpatient unit after sedation wore off and she was ambulatory.    IMPRESSION:  1.  Successful direct current cardioversion with 150J biphasic shock.    RECOMMENDATIONS/PLANS:  1.   Transfer back to inpatient unit  2.   Continue anticoagulation    ELIZABETH Verde CNP  Electrophysiology  Consult Service  Pager: [phone]

## 2018-03-12 NOTE — ANESTHESIA PREPROCEDURE EVALUATION
Anesthesia Evaluation     . Pt has had prior anesthetic. Type: General (Patient hypotensive and bradycardic following cardioversion in May 2017.  Required ICU admission for close monitoring.  )           ROS/MED HX    ENT/Pulmonary:  - neg pulmonary ROS     Neurologic:  - neg neurologic ROS     Cardiovascular:     (+) hypertension----. : . CHF Last EF: 60-65% date: 09/07/2017 NYHA classification: I. . :. dysrhythmias a-fib, . Previous cardiac testing Echodate:09/07/2017results:Normal left ventricular function, size, motion and thickness. Left ventricular ejection fraction 60-65%. Normal right ventricular size and function. Mild pulmonary hypertension. date: results: date: results: date: results:          METS/Exercise Tolerance:  >4 METS   Hematologic: Comments: Diagnosis of diffuse large B cell lymphoma status post R-CHOP chemotherapy x 6 cycles.      (+) History of blood clots pt is anticoagulated, -      Musculoskeletal:  - neg musculoskeletal ROS       GI/Hepatic:  - neg GI/hepatic ROS       Renal/Genitourinary:  - ROS Renal section negative       Endo:     (+) Chronic steroid usage for .      Psychiatric:  - neg psychiatric ROS       Infectious Disease:  - neg infectious disease ROS       Malignancy:   (+) Malignancy History of Lymphoma/Leukemia  Lymph CA Active status post Chemo,         Other:    - neg other ROS                 Physical Exam  Normal systems: pulmonary    Airway   Mallampati: II  TM distance: >3 FB  Neck ROM: full    Dental     Cardiovascular   Rhythm and rate: irregular and abnormal      Pulmonary                     Anesthesia Plan      History & Physical Review  History and physical reviewed and following examination; no interval change.    ASA Status:  3 .    NPO Status:  > 8 hours    Plan for General and Other with Intravenous induction. Maintenance will be TIVA.           Postoperative Care      Consents  Anesthetic plan, risks, benefits and alternatives discussed with:  Patient.  Use of  blood products discussed: No .   .      Brian Zaman MD  Anesthesiologist  1:18 PM  March 12, 2018

## 2018-03-13 VITALS
HEART RATE: 57 BPM | SYSTOLIC BLOOD PRESSURE: 122 MMHG | RESPIRATION RATE: 16 BRPM | WEIGHT: 118 LBS | OXYGEN SATURATION: 97 % | TEMPERATURE: 98.1 F | BODY MASS INDEX: 24.66 KG/M2 | DIASTOLIC BLOOD PRESSURE: 90 MMHG

## 2018-03-13 LAB
ALBUMIN SERPL-MCNC: 3.4 G/DL (ref 3.4–5)
ALP SERPL-CCNC: 132 U/L (ref 40–150)
ALT SERPL W P-5'-P-CCNC: 104 U/L (ref 0–50)
ANION GAP SERPL CALCULATED.3IONS-SCNC: 10 MMOL/L (ref 3–14)
AST SERPL W P-5'-P-CCNC: 65 U/L (ref 0–45)
BILIRUB SERPL-MCNC: 1 MG/DL (ref 0.2–1.3)
BUN SERPL-MCNC: 27 MG/DL (ref 7–30)
CALCIUM SERPL-MCNC: 8.8 MG/DL (ref 8.5–10.1)
CHLORIDE SERPL-SCNC: 102 MMOL/L (ref 94–109)
CO2 SERPL-SCNC: 21 MMOL/L (ref 20–32)
CREAT SERPL-MCNC: 0.92 MG/DL (ref 0.52–1.04)
ERYTHROCYTE [DISTWIDTH] IN BLOOD BY AUTOMATED COUNT: 13.6 % (ref 10–15)
GFR SERPL CREATININE-BSD FRML MDRD: 63 ML/MIN/1.7M2
GLUCOSE SERPL-MCNC: 99 MG/DL (ref 70–99)
HCT VFR BLD AUTO: 42.9 % (ref 35–47)
HGB BLD-MCNC: 14.1 G/DL (ref 11.7–15.7)
INTERPRETATION ECG - MUSE: NORMAL
MCH RBC QN AUTO: 37.7 PG (ref 26.5–33)
MCHC RBC AUTO-ENTMCNC: 32.9 G/DL (ref 31.5–36.5)
MCV RBC AUTO: 115 FL (ref 78–100)
PLATELET # BLD AUTO: 69 10E9/L (ref 150–450)
POTASSIUM SERPL-SCNC: 4.5 MMOL/L (ref 3.4–5.3)
PROT SERPL-MCNC: 6 G/DL (ref 6.8–8.8)
RBC # BLD AUTO: 3.74 10E12/L (ref 3.8–5.2)
SODIUM SERPL-SCNC: 133 MMOL/L (ref 133–144)
WBC # BLD AUTO: 4.1 10E9/L (ref 4–11)

## 2018-03-13 PROCEDURE — 25000125 ZZHC RX 250: Performed by: HOSPITALIST

## 2018-03-13 PROCEDURE — 25000132 ZZH RX MED GY IP 250 OP 250 PS 637: Performed by: INTERNAL MEDICINE

## 2018-03-13 PROCEDURE — 85027 COMPLETE CBC AUTOMATED: CPT | Performed by: INTERNAL MEDICINE

## 2018-03-13 PROCEDURE — 36415 COLL VENOUS BLD VENIPUNCTURE: CPT | Performed by: INTERNAL MEDICINE

## 2018-03-13 PROCEDURE — 80053 COMPREHEN METABOLIC PANEL: CPT | Performed by: INTERNAL MEDICINE

## 2018-03-13 PROCEDURE — 99238 HOSP IP/OBS DSCHRG MGMT 30/<: CPT | Mod: GC | Performed by: INTERNAL MEDICINE

## 2018-03-13 PROCEDURE — 25000125 ZZHC RX 250: Performed by: INTERNAL MEDICINE

## 2018-03-13 RX ORDER — SPIRONOLACTONE 25 MG/1
25 TABLET ORAL DAILY
Qty: 30 TABLET | Refills: 0 | Status: SHIPPED | OUTPATIENT
Start: 2018-03-14 | End: 2018-04-10

## 2018-03-13 RX ORDER — HYDROXYCHLOROQUINE SULFATE 200 MG/1
200 TABLET, FILM COATED ORAL 2 TIMES DAILY
Qty: 60 TABLET | Refills: 5 | Status: SHIPPED | OUTPATIENT
Start: 2018-03-13 | End: 2018-03-30

## 2018-03-13 RX ORDER — COLCHICINE 0.6 MG/1
0.6 TABLET ORAL DAILY
Qty: 30 TABLET | Refills: 0 | Status: SHIPPED | OUTPATIENT
Start: 2018-03-14 | End: 2018-04-24

## 2018-03-13 RX ADMIN — MULTIPLE VITAMINS W/ MINERALS TAB 1 TABLET: TAB at 13:48

## 2018-03-13 RX ADMIN — GABAPENTIN 200 MG: 100 CAPSULE ORAL at 13:48

## 2018-03-13 RX ADMIN — GABAPENTIN 200 MG: 100 CAPSULE ORAL at 08:15

## 2018-03-13 RX ADMIN — POTASSIUM CHLORIDE 20 MEQ: 1.5 POWDER, FOR SOLUTION ORAL at 08:16

## 2018-03-13 RX ADMIN — Medication 2 TABLET: at 13:47

## 2018-03-13 RX ADMIN — CALCIUM POLYCARBOPHIL 625 MG: 625 TABLET, FILM COATED ORAL at 13:47

## 2018-03-13 RX ADMIN — OXYCODONE HYDROCHLORIDE AND ACETAMINOPHEN 500 MG: 500 TABLET ORAL at 13:48

## 2018-03-13 RX ADMIN — ATENOLOL 25 MG: 25 TABLET ORAL at 08:15

## 2018-03-13 RX ADMIN — SPIRONOLACTONE 25 MG: 25 TABLET ORAL at 08:16

## 2018-03-13 RX ADMIN — ONDANSETRON HYDROCHLORIDE 4 MG: 4 TABLET, FILM COATED ORAL at 11:46

## 2018-03-13 RX ADMIN — TOLTERODINE 4 MG: 4 CAPSULE, EXTENDED RELEASE ORAL at 08:16

## 2018-03-13 RX ADMIN — APIXABAN 5 MG: 5 TABLET, FILM COATED ORAL at 08:15

## 2018-03-13 RX ADMIN — COLCHICINE 0.6 MG: 0.6 TABLET, FILM COATED ORAL at 08:15

## 2018-03-13 RX ADMIN — PREDNISONE 5 MG: 5 TABLET ORAL at 08:16

## 2018-03-13 RX ADMIN — CIPROFLOXACIN HYDROCHLORIDE 500 MG: 500 TABLET, FILM COATED ORAL at 08:16

## 2018-03-13 RX ADMIN — HYDROXYCHLOROQUINE SULFATE 200 MG: 200 TABLET, FILM COATED ORAL at 08:16

## 2018-03-13 NOTE — PLAN OF CARE
Problem: Patient Care Overview  Goal: Plan of Care/Patient Progress Review  D: PAF (paroxysmal atrial fibrillation) (H)-s/p Cardioversion 3/12    I: Monitored vitals and assessed pt status.     Running: PIV SL'd  PRN: Zofran 4mg PO x1    A: A0x4. VSS, on RA. Monitor SB/SR vs. WAP/some junctional 55-68 with 0-5 PACs and 0-2 PVC. Continues to have loose BMs. Pt states probably inaccurate UO due to being mixed with loose stools.c/o nausea-feels it's related to sedation with cardioversion.  I/O this shift:  In: -   Out: 100 [Urine:100]    Temp:  [97.6  F (36.4  C)-99  F (37.2  C)] 97.6  F (36.4  C)  Pulse:  [] 61  Heart Rate:  [] 59  Resp:  [14-18] 18  BP: ()/(55-90) 122/90  SpO2:  [95 %-100 %] 98 %      P: Continue to monitor Pt status and report changes to treatment team.    Addendum: Pt had 7 bts VT vs. Aberrant Afib @ 0536. MD updated

## 2018-03-14 ENCOUNTER — CARE COORDINATION (OUTPATIENT)
Dept: CARE COORDINATION | Facility: CLINIC | Age: 58
End: 2018-03-14

## 2018-03-14 ENCOUNTER — TELEPHONE (OUTPATIENT)
Dept: FAMILY MEDICINE | Facility: CLINIC | Age: 58
End: 2018-03-14

## 2018-03-14 NOTE — TELEPHONE ENCOUNTER
.ED/UC/IP follow up phone call:   DOS:  3/13/18  Primary pulmonary hypertension    RN please call to follow up.    Number of ED visits in past 12 months = 1/8

## 2018-03-14 NOTE — PROGRESS NOTES
Discharge Note:   Mild nausea improving today, received Zofrn 4 mg po once. Monitor showing WAP/Junctional/SB/SD w/ PACs 50's-60's. K= 4.5, on daily Klor dose, SBP up slightly will DC home on Aldactone for that. Refer to flow sheets for full assessments, VS, labs etc. OK'ed to DC home. Refer to DC orders.  DISCHARGE   Discharged to: Home  Via: [employer]  Accompanied by: NST escorted patient in W/C to Lobby door  Discharge Instructions: diet, activity, medications, follow up appointments, when to call the MD, and what to watchout for (i.e. s/s of infection, increasing SOB, palpitations, chest pain,) rapid, irregular HR, S/S worsening HF etc.   Prescriptions: To be filled by [facility] pharmacy per pt's request; medication list reviewed & sent with pt  Follow Up Appointments: [facility] RTCs arranged, patient to make PCP appt.; information given  Belongings: All sent with pt  IV: out  Telemetry: off  Pt exhibits understanding of above discharge instructions; all questions answered.  Discharge Paperwork: faxed by Care Coordinator & NST.  Resuming [facility].

## 2018-03-15 ENCOUNTER — TELEPHONE (OUTPATIENT)
Dept: FAMILY MEDICINE | Facility: CLINIC | Age: 58
End: 2018-03-15

## 2018-03-15 DIAGNOSIS — I30.9 ACUTE PERICARDITIS, UNSPECIFIED TYPE: ICD-10-CM

## 2018-03-15 DIAGNOSIS — I27.0 PRIMARY PULMONARY HYPERTENSION (H): Primary | ICD-10-CM

## 2018-03-15 NOTE — TELEPHONE ENCOUNTER
Reason for Call: Request for an order or referral:    Order or referral being requested:  Alyssa is requesting homecaring orders:  Skilled nursing once a week x 8 weeks +       4 PRN (or as needed) visits.    Please review and advised.  Thank you..Kellee Taylor    Date needed: as soon as possible    Has the patient been seen by the PCP for this problem? YES    Phone number Patient can be reached at:  Other phone number:  Alyssa [phone]*    Best Time:  Any time    Can we leave a detailed message on this number?  YES    Call taken on 3/15/2018 at 3:55 PM by Kellee Taylor

## 2018-03-15 NOTE — DISCHARGE SUMMARY
[facility], [place]    Discharge Summary  CARDIOLOGY    Date of Admission:  3/7/2018  Date of Discharge:  3/13/2018  2:36 PM  Discharging Provider: Kelly Smith  Date of Service (when I saw the patient): 3/13/18    Discharge Diagnoses   1. Acute on chronic heart failure with preserved ejection fraction with congestive hepatopathy   2. Symptomatic atrial fibrillation with rapid ventricular response  3. Pericarditis with loculated pericardial effusion   4. DLBCL  5. Chronic wound of LUE    History of Present Illness   Amelia Michel is an 57 year old female who presented with shortness of breath, weight gain, lightheadedness. She had recently had palpitations and cardioverted in an ED five days prior to admission.     Hospital Course   Amelia Michel was admitted on 3/7/2018.  The following problems were addressed during her hospitalization:    Acute on chronic heart failure with preserved ejection fraction with congestive hepatopathy  She initially received IV diuresis with good response. She was transitioned to oral diuretic and is discharged on spironolactone, 25 mg. Her potassium supplement was discontinued at discharge with the addition of spironolactone.  - Follow up with PCP within two weeks with BMP    Symptomatic atrial fibrillation with rapid ventricular response  During admission, patient reverted to atrial fibrillation with RVR which was symptomatic. She successfully underwent cardioversion with resolution of her symptoms. She will continue on prior atenolol and apixiban for stroke prevention.  - Follow up with PCP within two weeks  - Follow up with cardiology in one month      Pericarditis with loculated pericardial effusion   Patient had a cardiac MRI 3/6/18 notable for pericarditis and loculated pericardial effusion. She was started on colchicine which should be continued for one month with follow up cardiac MRI at that time.   - Follow up with cardiology in one month    - Follow up with repeat cardiac MRI in one month     DLBCL  Patient with DLBCL involving cardiac septum in remission. Hematology/oncology consulted in the hospital given concern for recurrence with above effusion and arrhythmia. Overall plan to monitor the effusion as outlined above and obtain sample if it progressed and is amenable to sampling.   - Follow up with oncology as previously scheduled   - Follow up with cardiac MRI as above    Kelly Smith  Internal Medicine PGY2  [phone]    Significant Results and Procedures   Comparison CMR: 07/24/2017     CONCLUSIONS:    1. Normal biventricular function, LVEF 55% and RVEF 51% with features of restrictive cardiomyopathy.   2. Localized pericardial effusion with pericardial restraint consistent with pericarditis.      Pending Results   None    Code Status   Full Code       Primary Care Physician   Comfort Sabillon     General: AAOx3, NAD  HEENT: MMM, PERRLA, EOM intact  CV: RRR no m/g/r, normal S1S2  Resp: Decreased breath sounds at right lung base  Abd: Soft, non-tender, BS+, + hepatomegaly  Extremities: Radial and pedal pulses intact and symmetric, no pedal edema. LUE wound with scarring, small area of open ulceration.   Neuro: No lateralizing symptoms or focal neurologic deficits    Discharge Disposition   Discharged to home  Condition at discharge: Stable    Consultations This Hospital Stay   VASCULAR ACCESS CARE ADULT IP CONSULT  HEMATOLOGY & ONCOLOGY IP CONSULT  SOCIAL WORK IP CONSULT  WOUND OSTOMY CONTINENCE NURSE  IP CONSULT  HEMATOLOGY & ONCOLOGY IP CONSULT  VASCULAR ACCESS CARE ADULT IP CONSULT    Time Spent on this Encounter   IKelly, personally saw the patient today and spent greater than 30 minutes discharging this patient.    Discharge Orders     Home care nursing referral     Medication Therapy Management Referral     Activity   Your activity upon discharge: activity as tolerated     When to contact your care team   Call your primary  doctor if you have any of the following:  increased shortness of breath, increased swelling, chest pain, lightheadedness.     Reason for your hospital stay   You were hospitalized for fluid overload from your heart which improved with IV diuretics. You were started on a new diuretic medication (spironolactone). Because this medication can increase your potassium level, you should also stop your potassium supplement.     You had a cardiac MRI which showed that the area surrounding your heart was inflamed. You were started on an anti-inflammatory medication (colchicine) which should be continued for the next four weeks. You will have a repeat cardiac MRI in one month to follow this as well as cardiology follow-up in one month.     While in the hospital, you underwent a cardioversion to get you back to your normal heart rhythm. Since then, you remained in a normal rhythm and you should continue your anticoagulation and your atenolol.     Adult [facility] Follow-up and recommended labs and tests   Follow up with primary care provider, Comfort Sabillon, within 14 days for hospital follow- up.  The following labs/tests are recommended: BMP.      Follow up with Dr. Eaton, at [facility], in one month regarding new diagnosis. The following labs/tests are recommended: BMP. Patient is scheduled on Wednesday, 4/11/18 at 10:00am with Dr. Eaton.    Appointments on [place]or [facility] [place]with [facility] or [facility] provider or service). Call [phone] if you haven't heard regarding these appointments within 7 days of discharge.     Diet   Follow this diet upon discharge:      Low Saturated Fat Na <2400 mg       Discharge Medications   Discharge Medication List as of 3/13/2018  2:14 PM      START taking these medications    Details   colchicine 0.6 MG tablet Take 1 tablet (0.6 mg) by mouth daily, Disp-30 tablet, R-0, E-Prescribe      spironolactone (ALDACTONE) 25 MG tablet Take 1 tablet (25 mg) by mouth daily, Disp-30  tablet, R-0, E-Prescribe         CONTINUE these medications which have CHANGED    Details   hydroxychloroquine (PLAQUENIL) 200 MG tablet Take 1 tablet (200 mg) by mouth 2 times daily, Disp-60 tablet, R-5, E-Prescribe         CONTINUE these medications which have NOT CHANGED    Details   tolterodine (DETROL LA) 4 MG 24 hr capsule Historical      ciprofloxacin (CIPRO) 500 MG tablet Take 1 tablet (500 mg) by mouth 2 times daily for 10 days, Disp-20 tablet, R-0, E-Prescribe      atenolol (TENORMIN) 25 MG tablet Take 1 tablet (25 mg) by mouth 2 times daily, Disp-60 tablet, R-3, E-Prescribe      apixaban ANTICOAGULANT (ELIQUIS) 5 MG tablet Take 1 tablet (5 mg) by mouth 2 times daily, Disp-180 tablet, R-3, E-Prescribe      predniSONE (DELTASONE) 5 MG tablet Take 1 tablet (5 mg) by mouth daily, Disp-90 tablet, R-2, E-Prescribe      gabapentin (NEURONTIN) 100 MG capsule Take 2 capsules (200 mg) by mouth 3 times daily, Disp-180 capsule, R-3, E-Prescribe      acetaminophen (TYLENOL) 325 MG tablet Take 2 tablets (650 mg) by mouth every 4 hours as needed for mild pain, fever or headaches, OTC      calcium polycarbophil (FIBERCON) 625 MG tablet Take 1 tablet by mouth 2 times daily, Historical      calcium-vitamin D (CALTRATE) 600-400 MG-UNIT per tablet Take 2 tablets by mouth every morning, Disp-60 tablet, R-0, E-Prescribe      multivitamin, therapeutic with minerals (THERA-VIT-M) TABS tablet Take 1 tablet by mouth daily, Disp-30 each, R-0, E-Prescribe      ascorbic acid 500 MG TABS Take 1 tablet (500 mg) by mouth daily, Disp-30 tablet, R-0, E-Prescribe      order for DME Equipment being ordered: BP cuffDisp-1 Device, R-1, Local Print      HYDROCORTISONE PO Take 100 mg by mouth IV, Historical         STOP taking these medications       potassium chloride (KLOR-CON) 20 MEQ Packet Comments:   Reason for Stopping:             Allergies   Allergies   Allergen Reactions     Blood Transfusion Related (Informational Only) Hives      Hives with platelets. Give benadryl premedication.     Metoprolol Other (See Comments)     Pt and  report that metoprolol does not work for her and also reports feeling unwell with this medication. She has been able to tolerate atenolol, which as worked in controlling her HR.      No Clinical Screening - See Comments      Penicillin Allergy Skin Test not performed, see antimicrobial management team progress note 7/5/17.       Penicillins      Tape [Adhesive Tape] Rash     Data   Most Recent 3 CBC's:  Recent Labs   Lab Test  03/13/18   0716  03/12/18   0709  03/11/18   0724   WBC  4.1  3.4*  3.3*   HGB  14.1  13.5  13.5   MCV  115*  116*  115*   PLT  69*  71*  72*      Most Recent 3 BMP's:  Recent Labs   Lab Test  03/13/18   0716  03/12/18   0709  03/11/18   2330   NA  133  140  137   POTASSIUM  4.5  3.9  3.8   CHLORIDE  102  106  102   CO2  21  26  27   BUN  27  16  22   CR  0.92  0.71  0.71   ANIONGAP  10  8  7   VIKTORIYA  8.8  9.0  8.8   GLC  99  78  111*     Most Recent 2 LFT's:  Recent Labs   Lab Test  03/13/18   0716  03/12/18   0709   AST  65*  30   ALT  104*  89*   ALKPHOS  132  124   BILITOTAL  1.0  0.6     Most Recent INR's and Anticoagulation Dosing History:  Anticoagulation Dose History     Recent Dosing and Labs Latest Ref Rng & Units 7/31/2017 8/2/2017 8/4/2017 8/7/2017 11/7/2017 12/18/2017 3/7/2018    INR 0.86 - 1.14 1.36(H) 1.44(H) 1.42(H) 1.21(H) - - 3.42(H)    INR Point of Care 0.86 - 1.14 - - - - 1.0 1.1 -        Most Recent 3 Troponin's:  Recent Labs   Lab Test  09/10/17   1717  09/10/17   1333  09/10/17   1120   05/29/17   0703   TROPI  0.043  0.042  0.042   < >   --    TROPONIN   --    --    --    --   0.19*    < > = values in this interval not displayed.     Most Recent Cholesterol Panel:  Recent Labs   Lab Test  04/29/16   0749   CHOL  106   LDL  46   HDL  10*   TRIG  251*     Most Recent 6 Bacteria Isolates From Any Culture (See EPIC Reports for Culture Details):  Recent Labs   Lab Test   03/01/18   0930  11/07/17   1130  08/23/17   0910  08/16/17   1616  08/12/17   0322  08/11/17   0402   CULT  10,000 to 50,000 colonies/mL  Proteus mirabilis  *  No growth  No growth  Specimen not received  Canceled, Test credited     [facility] was notified specimen will be collected 8/21 or 8/22/17.  Called to  [facility] @ [facility] to notify this order was cancelled and  future order will need to be   requested for future collection. 1025 8/17/17. NAP    No growth  No growth     Most Recent TSH, T4 and A1c Labs:  Recent Labs   Lab Test  08/04/17   0204   05/15/17   1752   TSH   --    --   1.03   A1C  6.3*   < >   --     < > = values in this interval not displayed.     Kelly Smith MD  Medical Resident in Cardiology      CARDIOLOGY STAFF  Patient seen and examined by me (seen 13 March 2018, note written 15 March 2018).  History and physical examination discussed with Dr. Smith whose note reflects our joint assessment and recommendation/plans.  15 minutes were spent in discharge planning and instruction.  Richy Mcguire

## 2018-03-16 NOTE — TELEPHONE ENCOUNTER
Left message on [employer]'s personal  answering machine to call the clinic RN.  Mercedes Leger RN

## 2018-03-16 NOTE — TELEPHONE ENCOUNTER
Magda I haven't seen this patient in 2 years. You at least have seen her physically. I am routing this to you. Comfort Sabillon M.D.;

## 2018-03-19 ENCOUNTER — ALLIED HEALTH/NURSE VISIT (OUTPATIENT)
Dept: PHARMACY | Facility: OTHER | Age: 58
End: 2018-03-19
Payer: COMMERCIAL

## 2018-03-19 DIAGNOSIS — I10 ESSENTIAL HYPERTENSION: ICD-10-CM

## 2018-03-19 DIAGNOSIS — I50.31 ACUTE DIASTOLIC CONGESTIVE HEART FAILURE (H): ICD-10-CM

## 2018-03-19 DIAGNOSIS — M06.9 RHEUMATOID ARTHRITIS, INVOLVING UNSPECIFIED SITE, UNSPECIFIED RHEUMATOID FACTOR PRESENCE: ICD-10-CM

## 2018-03-19 DIAGNOSIS — E63.9 NUTRITIONAL DEFICIENCY: ICD-10-CM

## 2018-03-19 DIAGNOSIS — N32.81 OVERACTIVE BLADDER: ICD-10-CM

## 2018-03-19 DIAGNOSIS — E78.5 HYPERLIPIDEMIA LDL GOAL <130: ICD-10-CM

## 2018-03-19 DIAGNOSIS — G62.9 NEUROPATHY: Primary | ICD-10-CM

## 2018-03-19 DIAGNOSIS — I48.0 PAROXYSMAL ATRIAL FIBRILLATION (H): ICD-10-CM

## 2018-03-19 PROCEDURE — 99605 MTMS BY PHARM NP 15 MIN: CPT | Performed by: PHARMACIST

## 2018-03-19 PROCEDURE — 99607 MTMS BY PHARM ADDL 15 MIN: CPT | Performed by: PHARMACIST

## 2018-03-19 NOTE — PROGRESS NOTES
"SUBJECTIVE/OBJECTIVE:                Amelia Michel is a 57 year old female called for a transitions of care visit.  She was discharged from [facility] on 3/13/18 for new heart failure.     Chief Complaint: Elana.    Allergies/ADRs: Reviewed in Epic  Tobacco: No tobacco use   Alcohol: Less than 1 beverage / month  Caffeine: rarely drinks caffeine   Activity: limited d/t new heart failure  PMH: Reviewed in Epic    Medication Adherence/Access:  Patient uses pill box(es).  Patient takes medications 2 time(s) per day.  Per patient, misses medication 0 times per week.   Medication barriers: none.   The patient fills medications at [facility]: YES.    Acute Pericarditis: Current medications include colchicine 0.6 mg daily.  She is having terrible diarrhea as a result.  She has tried Fibercon and that has not been helpful.  She takes Imodium 1 tablet daily with some success and describes the diarrhea as \"managable\" in this situation. She only expects to be taking colchicine for 1 month but wonders if she can take more than 1 tablet daily.      Rheumatoid Arthritis: Current medications include hydroxychloroquine 200 mg twice daily and prednisone 5 mg daily.  She has a history of using Arava, mtx along with the hydroxychloroquine and prednisone. Colchicine is much more helpful for shoulder pain and inflammation at this time and this part of the treatment makes her happy. The hydroxychloroquine is new back to her. She does not have concerns about these today.     HFpEF/Hypertension: Current medications include spironolactone 25 mg daily and atenolol 25 mg daily. She notes that she is not experiencing the diuretic effect with spironolactone that she did with furosemide and wonders if this is concerning. Has a history with metoprolol, digoxin, amiodarone, diltiazem in the past to which she had bad reactions.  We discuss that with a new heart failure diagnosis that she may get started on some new cardiac medications depending on how she " recovers. She denies side effects today.     Neuropathy: Current medications include gabapentin 200 mg 2 capsules 3 times daily.  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  She finds that it works well enough for her to walk but she is unable to stand or sit for long periods of time.  She is pleased with her current level of control.     Paroxysmal AFib: Current medications include Eliquis 5 mg twice daily. She has a history of using Lovenox but has switched back to Eliquis.  She denies s/sx of unusual bruising or bleeding.      Overactive Bladder:  Current medications include tolterodine ER 4 mg daily. She feels that this works to reduce her bathroom trips and urgency and she denies side effects.     Vitamins: Current medications include MVI daily, Ca+D 600-400 mg twice daily and vitamin C 500 mg daily. She has no concerns about these therapies today.    Current labs include:  BP Readings from Last 3 Encounters:   03/13/18 122/90   03/07/18 148/89   03/03/18 103/76     Today's Vitals: There were no vitals taken for this visit. - telemed     Lab Results   Component Value Date    A1C 6.3 08/04/2017   .  Lab Results   Component Value Date    CHOL 106 04/29/2016     Lab Results   Component Value Date    TRIG 251 04/29/2016     Lab Results   Component Value Date    HDL 10 04/29/2016     Lab Results   Component Value Date    LDL 46 04/29/2016     Liver Function Studies -   Recent Labs   Lab Test  03/13/18   0716   PROTTOTAL  6.0*   ALBUMIN  3.4   BILITOTAL  1.0   ALKPHOS  132   AST  65*   ALT  104*     Lab Results   Component Value Date    UCRR 24 08/24/2017       Last Basic Metabolic Panel:  Lab Results   Component Value Date     03/13/2018      Lab Results   Component Value Date    POTASSIUM 4.5 03/13/2018     Lab Results   Component Value Date    CHLORIDE 102 03/13/2018     Lab Results   Component Value Date    BUN 27 03/13/2018     Lab Results   Component Value Date    CR 0.92 03/13/2018     GFR Estimate   Date  Value Ref Range Status   03/13/2018 63 >60 mL/min/1.7m2 Final     Comment:     Non  GFR Calc   03/12/2018 85 >60 mL/min/1.7m2 Final     Comment:     Non  GFR Calc   03/11/2018 85 >60 mL/min/1.7m2 Final     Comment:     Non  GFR Calc     TSH   Date Value Ref Range Status   05/15/2017 1.03 0.40 - 4.00 mU/L Final     Most Recent Immunizations   Administered Date(s) Administered     Influenza Vaccine IM 3yrs+ 4 Valent IIV4 11/07/2017     Pneumococcal 23 valent 08/31/2011     TDAP Vaccine (Adacel) 02/08/2011     ASSESSMENT:                 Current medications were reviewed today.      Medication Adherence: excellent, no issues identified    Acute Pericarditis: Plan in place with cardiology. Patient may take 2 tablets of loperamide to alleviate diarrhea if needed. Diarrhea is likely secondary to colchicine for pericarditis.     Rheumatoid Arthritis: Plan in place with rheumatology.     HFpEF/Hypertension: Improved. Patient is to continue daily weights and follow up with [facility].     Neuropathy: Stable.     Paroxysmal AFib: Stable.     Overactive Bladder: Stable    Vitamins: Stable.     PLAN:                  1) Consider use of loperamide 2 tablets with diarrhea.     I spent 30 minutes with this patient today. A copy of the visit note was provided to the patient's primary care provider.    Will follow up as needed per patient.    The patient was sent via [facility] a summary of these recommendations as an after visit summary.    Melida Chaney, Pharm.D., BCACP  Medication Therapy Management Pharmacist  Page/VM:  [phone]

## 2018-03-19 NOTE — TELEPHONE ENCOUNTER
ED/Discharge Protocol    Patient being followed by care coordination and has follow up scheduled.    Clemencia Rai RN

## 2018-03-19 NOTE — MR AVS SNAPSHOT
After Visit Summary   3/19/2018    Amelia Michel    MRN: [id number]           Patient Information     Date Of Birth          1960        Visit Information        Provider Department      3/19/2018 3:00 PM Melida Chaney [facility]        Today's Diagnoses     Neuropathy    -  1    Nutritional deficiency        Rheumatoid arthritis, involving unspecified site, unspecified rheumatoid factor presence (H)        Hyperlipidemia LDL goal <130        Essential hypertension        Acute diastolic congestive heart failure (H)        Overactive bladder        Paroxysmal atrial fibrillation (H)           Follow-ups after your visit        Your next 10 appointments already scheduled     Mar 22, 2018 11:40 AM CDT   Office Visit with Gala Stringer PA-C   [facility] ([facility]    [address] [id number] [phone]           Bring a current list of meds and any records pertaining to this visit. For Physicals, please bring immunization records and any forms needing to be filled out. Please arrive 10 minutes early to complete paperwork.            Mar 23, 2018  2:00 PM CDT   (Arrive by 1:45 PM)   CORE NEW with Arcelia Souza PA-C   [facility] ([facility])    [address] [place] [address]  [address]  [facility] [address] [phone]            Mar 27, 2018 11:30 AM CDT   Cancer Rehab Treatment with Ana Loera, PT   [facility] ([facility])    [facility] [address] [facility]  1st Floor  [address]  [facility] [address] [phone]            Mar 29, 2018  4:30 PM CDT   (Arrive by 4:15 PM)   NEW HAND with Kevin Sheldon MD   [facility] ([facility])    [address] [facility]  4th Floor  [facility] [address] [phone]            Mar 30, 2018 10:00 AM CDT   (Arrive by 9:45 AM)   Return  Visit with Pj Cunha MD   [facility] ([facility])    [address]  [address]  [facility] [address] [phone]            Apr 03, 2018 11:30 AM CDT   Cancer Rehab Treatment with Ana Loera PT   [facility] ([facility])    [address] [facility]  1st Floor  [address]  [facility] [id number] [phone]            Apr 06, 2018  9:30 AM CDT   (Arrive by 9:15 AM)   MR MYOCARDIUM W CONTRAST with UUMR4, UU CV MR NURSE   [facility] [place] ([facility])    [address] [facility] [id number] [phone]           Take your medicines as usual, unless your doctor tells you not to. Bring a list of your current medicines to your exam (including vitamins, minerals and over-the-counter drugs).  You may or may not receive intravenous (IV) contrast for this exam pending the discretion of the Radiologist.  You do not need to do anything special to prepare.  The MRI machine uses a strong magnet. Please wear clothes without metal (snaps, zippers). A sweatsuit works well, or we may give you a hospital gown.  Please remove any body piercings and hair extensions before you arrive. You will also remove watches, jewelry, hairpins, wallets, dentures, partial dental plates and hearing aids. You may wear contact lenses, and you may be able to wear your rings. We have a safe place to keep your personal items, but it is safer to leave them at home.  **IMPORTANT** THE INSTRUCTIONS BELOW ARE ONLY FOR THOSE PATIENTS WHO HAVE BEEN PRESCRIBED SEDATION OR GENERAL ANESTHESIA DURING THEIR MRI PROCEDURE:  IF YOUR DOCTOR PRESCRIBED ORAL SEDATION (take medicine to help you relax during your exam):   You must get the medicine from your doctor (oral medication) before you arrive. Bring the medicine to the exam. Do not take it at home. You ll be told when to  take it upon arriving for your exam.   Arrive one hour early. Bring someone who can take you home after the test. Your medicine will make you sleepy. After the exam, you may not drive, take a bus or take a taxi by yourself.  IF YOUR DOCTOR PRESCRIBED IV SEDATION:   Arrive one hour early. Bring someone who can take you home after the test. Your medicine will make you sleepy. After the exam, you may not drive, take a bus or take a taxi by yourself.   No eating 6 hours before your exam. You may have clear liquids up until 4 hours before your exam. (Clear liquids include water, clear tea, black coffee and fruit juice without pulp.)  IF YOUR DOCTOR PRESCRIBED ANESTHESIA (be asleep for your exam):   Arrive 1 1/2 hours early. Bring someone who can take you home after the test. You may not drive, take a bus or take a taxi by yourself.   No eating 8 hours before your exam. You may have clear liquids up until 4 hours before your exam. (Clear liquids include water, clear tea, black coffee and fruit juice without pulp.)   You will spend four to five hours in the recovery room.  Please call the Imaging Department at your exam site with any questions.            Apr 10, 2018 12:45 PM CDT   Cancer Rehab Treatment with Ana Loera PT   [facility] [place] [facility] ([facility])    [address] [place] [address] Floor    [facility] [address] [phone]            Apr 11, 2018 10:00 AM CDT   (Arrive by 9:45 AM)   RETURN ARRHYTHMIA with Juan Eaton MD   [facility] ([facility])    [address] [place] [address]  [address]  [facility] [id number] [phone]              Future tests that were ordered for you today     Open Future Orders        Priority Expected Expires Ordered    Basic metabolic panel Routine 3/23/2018 3/28/2018 3/20/2018    N terminal pro BNP outpatient Routine 3/23/2018 3/20/2019 3/20/2018            Who to contact      If you have questions or need follow up information about today's clinic visit or your schedule please contact [facility] directly at [phone].  Normal or non-critical lab and imaging results will be communicated to you by MyChart, letter or phone within 4 business days after the clinic has received the results. If you do not hear from us within 7 days, please contact the clinic through MyChart or phone. If you have a critical or abnormal lab result, we will notify you by phone as soon as possible.  Submit refill requests through [employer] or call your pharmacy and they will forward the refill request to us. Please allow 3 business days for your refill to be completed.          Additional Information About Your Visit        [employer] Information     [employer] gives you secure access to your electronic health record. If you see a primary care provider, you can also send messages to your care team and make appointments. If you have questions, please call your primary care clinic.  If you do not have a primary care provider, please call [phone] and they will assist you.        Care EveryWhere ID     This is your Care EveryWhere ID. This could be used by other organizations to access your [facility] medical records  [id number]         Blood Pressure from Last 3 Encounters:   03/13/18 122/90   03/07/18 148/89   03/03/18 103/76    Weight from Last 3 Encounters:   03/13/18 118 lb (53.5 kg)   03/07/18 130 lb (59 kg)   03/02/18 121 lb (54.9 kg)              Today, you had the following     No orders found for display       Primary Care Provider Office Phone # Fax #    Comfort Sabillon [phone] [address] [place] [address]        Equal Access to Services     [facility]: Hadii laurita Flores, waerasto ibarra, qaybta durga blount. So [facility] [phone].    ATENCIÓN: Si habla español, tiene a sarmiento disposición  servicios gratuitos de asistencia lingüística. Taylor hooker [phone].    We comply with applicable federal civil rights laws and [place] laws. We do not discriminate on the basis of race, color, national origin, age, disability, sex, sexual orientation, or gender identity.            Thank you!     Thank you for choosing [facility] [place] [facility]  for your care. Our goal is always to provide you with excellent care. Hearing back from our patients is one way we can continue to improve our services. Please take a few minutes to complete the written survey that you may receive in the mail after your visit with us. Thank you!             Your Updated Medication List - Protect others around you: Learn how to safely use, store and throw away your medicines at www.disposemymeds.org.          This list is accurate as of 3/19/18 11:59 PM.  Always use your most recent med list.                   Brand Name Dispense Instructions for use Diagnosis    acetaminophen 325 MG tablet    TYLENOL     Take 2 tablets (650 mg) by mouth every 4 hours as needed for mild pain, fever or headaches    Diffuse large B-cell lymphoma of extranodal site (H)       apixaban ANTICOAGULANT 5 MG tablet    ELIQUIS    180 tablet    Take 1 tablet (5 mg) by mouth 2 times daily    Paroxysmal atrial fibrillation (H)       ascorbic acid 500 MG Tabs     30 tablet    Take 1 tablet (500 mg) by mouth daily    Diffuse large B-cell lymphoma, unspecified body region (H)       atenolol 25 MG tablet    TENORMIN    60 tablet    Take 1 tablet (25 mg) by mouth 2 times daily    Atrial tachycardia (H)       calcium polycarbophil 625 MG tablet    FIBERCON     Take 1 tablet by mouth 2 times daily        calcium-vitamin D 600-400 MG-UNIT per tablet    CALTRATE    60 tablet    Take 2 tablets by mouth every morning    Diffuse large B-cell lymphoma, unspecified body region (H)       colchicine 0.6 MG tablet     30 tablet    Take 1 tablet (0.6 mg) by mouth daily    Acute  pericarditis, unspecified type       gabapentin 100 MG capsule    NEURONTIN    180 capsule    Take 2 capsules (200 mg) by mouth 3 times daily    Critical illness myopathy       HYDROCORTISONE PO      Take 100 mg by mouth IV        hydroxychloroquine 200 MG tablet    PLAQUENIL    60 tablet    Take 1 tablet (200 mg) by mouth 2 times daily    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 (H)       multivitamin, therapeutic with minerals Tabs tablet     30 each    Take 1 tablet by mouth daily    Diffuse large B-cell lymphoma, unspecified body region (H)       order for DME     1 Device    Equipment being ordered: BP cuff    Hypertension, unspecified type       predniSONE 5 MG tablet    DELTASONE    90 tablet    Take 1 tablet (5 mg) by mouth daily    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 (H)       spironolactone 25 MG tablet    ALDACTONE    30 tablet    Take 1 tablet (25 mg) by mouth daily    Acute on chronic diastolic heart failure (H)       tolterodine 4 MG 24 hr capsule    DETROL LA

## 2018-03-20 ENCOUNTER — CARE COORDINATION (OUTPATIENT)
Dept: CARDIOLOGY | Facility: CLINIC | Age: 58
End: 2018-03-20

## 2018-03-20 ENCOUNTER — PRE VISIT (OUTPATIENT)
Dept: CARDIOLOGY | Facility: CLINIC | Age: 58
End: 2018-03-20

## 2018-03-20 ENCOUNTER — HOSPITAL ENCOUNTER (OUTPATIENT)
Dept: PHYSICAL THERAPY | Facility: CLINIC | Age: 58
Setting detail: THERAPIES SERIES
End: 2018-03-20
Attending: INTERNAL MEDICINE
Payer: COMMERCIAL

## 2018-03-20 DIAGNOSIS — I50.30 (HFPEF) HEART FAILURE WITH PRESERVED EJECTION FRACTION (H): Primary | ICD-10-CM

## 2018-03-20 PROCEDURE — 97110 THERAPEUTIC EXERCISES: CPT | Mod: GP | Performed by: PHYSICAL THERAPIST

## 2018-03-20 PROCEDURE — 40000360 ZZHC STATISTIC PT CANCER REHAB VISIT: Performed by: PHYSICAL THERAPIST

## 2018-03-20 PROCEDURE — 97140 MANUAL THERAPY 1/> REGIONS: CPT | Mod: GP | Performed by: PHYSICAL THERAPIST

## 2018-03-20 NOTE — PROGRESS NOTES
Patient was inpatient for Acute on chronic HF with preserved ejection fraction with congestive hepatopathy, AF RVR, pericarditis with loculated pericardial effusion from 3/7/18 to 3/03/18. This is a post hospitalization phone call.     Patient responses in bold. Caller's communications in italics.      STATUS SINCE DISCHARGE  Doing okay    MEDICATIONS    START taking       Dose / Directions     colchicine 0.6 MG tablet   Used for:  Acute pericarditis, unspecified type          Dose:  0.6 mg   Start taking on:  3/14/2018   Take 1 tablet (0.6 mg) by mouth daily   Quantity:  30 tablet   Refills:  0         spironolactone 25 MG tablet   Commonly known as:  ALDACTONE   Used for:  Acute on chronic diastolic heart failure (H)          Dose:  25 mg   Start taking on:  3/14/2018   Take 1 tablet (25 mg) by mouth daily   Quantity:  30 tablet   Refills:  0          STOP taking           potassium chloride 20 MEQ Packet   Commonly known as:  KLOR-CON              Patient reports diarrhea with the colchicine.   Reports spironolactone not working that well.   Weights:   3/18/18: 118.1 lb   3/20/18: 121 lb  Abdominal fluid retention  Denies SOB  O2 97-99%    Reviewed HF/CORE with patient. She is interested in being seen. CORE RNCCs notified and will contact patient to be seen this week.       Is someone helping you to set up your medications?  Self.   Confirms has enough meds.     ACTIVITY  How is your activity tolerance?     Ok, doing laundry and cooking.     ASSISTANCE  Do you have someone at home to assist you with your daily activities?  . He works. He can work from home if needed.   Amelia is not currently working.     OTHER CONCERNS  NA       FOLLOW UP  Cardiac MRI: 4/6/18  Dr. Eaton: 4/11/18  Tentative CORE appt.          CONTACT INFORMATION  Please feel free to call us with any other questions or symptoms that are concerning for you at [phone], if it is after 4:30 in the afternoon, or a weekend please call  [phone] and ask for the on call specialist.  We want to do everything we can to help prevent you needing to return to the ED, so please do not hesitate to call us.

## 2018-03-20 NOTE — PROGRESS NOTES
Notified by Dr Angelito Arcos's nurse that she did post hospital follow up call and noticed mention of follow up with CORE in AVS. Dc'd from hospital on 3/13. New heart failure, pt states she doesn't feel like the Spironalactone is working that well and she has gone from 118.1 to 121 lbs since leaving the hospital. Denies shortness of breath, main complaint is increase in abdominal bloating.   Scheduled new CORE visit with Alicia TOBIN on Friday 3/23 at 2:00.   Pao Alston RN

## 2018-03-22 ENCOUNTER — CARE COORDINATION (OUTPATIENT)
Dept: CARDIOLOGY | Facility: CLINIC | Age: 58
End: 2018-03-22

## 2018-03-22 ENCOUNTER — HOSPITAL ENCOUNTER (OUTPATIENT)
Facility: CLINIC | Age: 58
End: 2018-03-22
Attending: PHYSICIAN ASSISTANT
Payer: COMMERCIAL

## 2018-03-22 DIAGNOSIS — I48.91 ATRIAL FIBRILLATION (H): Primary | ICD-10-CM

## 2018-03-22 RX ORDER — POTASSIUM CHLORIDE 1500 MG/1
20 TABLET, EXTENDED RELEASE ORAL
Status: CANCELLED | OUTPATIENT
Start: 2018-03-22

## 2018-03-22 RX ORDER — LIDOCAINE 40 MG/G
CREAM TOPICAL
Status: CANCELLED | OUTPATIENT
Start: 2018-03-22

## 2018-03-22 RX ORDER — POTASSIUM CHLORIDE 1500 MG/1
40 TABLET, EXTENDED RELEASE ORAL
Status: CANCELLED | OUTPATIENT
Start: 2018-03-22

## 2018-03-22 RX ORDER — MAGNESIUM SULFATE HEPTAHYDRATE 40 MG/ML
2 INJECTION, SOLUTION INTRAVENOUS
Status: CANCELLED | OUTPATIENT
Start: 2018-03-22

## 2018-03-22 ASSESSMENT — ENCOUNTER SYMPTOMS
POOR WOUND HEALING: 1
STIFFNESS: 1
MYALGIAS: 1
EXERCISE INTOLERANCE: 1
HYPERTENSION: 1
HEARTBURN: 0
LEG PAIN: 0
ARTHRALGIAS: 1
JAUNDICE: 0
LIGHT-HEADEDNESS: 0
RECTAL PAIN: 0
EXERCISE INTOLERANCE: 1
MUSCLE WEAKNESS: 1
SKIN CHANGES: 0
WEIGHT GAIN: 1
ALTERED TEMPERATURE REGULATION: 0
ABDOMINAL PAIN: 0
BLOOD IN STOOL: 0
INCREASED ENERGY: 1
NAIL CHANGES: 0
ARTHRALGIAS: 1
BLOOD IN STOOL: 0
VOMITING: 0
SLEEP DISTURBANCES DUE TO BREATHING: 0
BLOATING: 1
VOMITING: 0
HEMATURIA: 0
STIFFNESS: 1
POLYPHAGIA: 0
JOINT SWELLING: 0
NECK PAIN: 0
ABDOMINAL PAIN: 0
MYALGIAS: 1
INCREASED ENERGY: 1
POLYPHAGIA: 0
DIARRHEA: 1
RECTAL PAIN: 0
DIFFICULTY URINATING: 0
JAUNDICE: 0
CONSTIPATION: 0
LEG PAIN: 0
HEARTBURN: 0
BACK PAIN: 0
ALTERED TEMPERATURE REGULATION: 0
FATIGUE: 1
NIGHT SWEATS: 0
FEVER: 0
DECREASED APPETITE: 0
MUSCLE CRAMPS: 1
JOINT SWELLING: 0
DIARRHEA: 1
BOWEL INCONTINENCE: 0
BOWEL INCONTINENCE: 0
CHILLS: 0
FLANK PAIN: 0
ORTHOPNEA: 0
DYSURIA: 0
WEIGHT GAIN: 1
DYSURIA: 0
PALPITATIONS: 1
CONSTIPATION: 0
FATIGUE: 1
WEIGHT LOSS: 0
WEIGHT LOSS: 0
CHILLS: 0
NAIL CHANGES: 0
ORTHOPNEA: 0
LIGHT-HEADEDNESS: 0
HEMATURIA: 0
DIFFICULTY URINATING: 0
SLEEP DISTURBANCES DUE TO BREATHING: 0
SKIN CHANGES: 0
NAUSEA: 0
FLANK PAIN: 0
NAUSEA: 0
HYPOTENSION: 0
POOR WOUND HEALING: 1
NECK PAIN: 0
PALPITATIONS: 1
HALLUCINATIONS: 0
MUSCLE WEAKNESS: 1
BLOATING: 1
FEVER: 0
DECREASED APPETITE: 0
NIGHT SWEATS: 0
HYPOTENSION: 0
BACK PAIN: 0
HYPERTENSION: 1
SYNCOPE: 0
MUSCLE CRAMPS: 1
HALLUCINATIONS: 0
SYNCOPE: 0

## 2018-03-22 ASSESSMENT — MINNESOTA LIVING WITH HEART FAILURE QUESTIONNAIRE (MLHF)
GIVING YOU SIDE EFFECTS FROM TREATMENTS: 1
HAVING TO SIT OR LIE DOWN DURING THE DAY: 1
DIFFICULTY SOCIALIZING WITH FAMILY OR FRIENDS: 1
MAKING YOU FEEL DEPRESSED: 1
DIFFICULTY GOING AWAY FROM HOME: 1
LOSS OF SELF CONTROL IN YOUR LIFE: 1
DIFFICULTY SLEEPING WELL AT NIGHT: 1
SWELLING IN ANKLES OR LEGS: 1
TIRED, FATIGUED OR LOW ON ENERGY: 1
WALKING ABOUT OR CLIMBING STAIRS DIFFICULT: 1
COSTING YOU MONEY FOR MEDICAL CARE: 1
DIFFICULTY WITH RECREATIONAL PASTIMES, SPORTS, HOBBIES: 1
DIFFICULTY WITH SEXUAL ACTIVITIES: 0
EATING LESS FOODS YOU LIKE: 3
DIFFICULTY TO CONCENTRATE OR REMEMBERING THINGS: 1
WORKING AROUND THE HOUSE OR YARD DIFFICULT: 1
MAKING YOU SHORT OF BREATH: 1
DIFFICULTY WORKING TO EARN A LIVING: 0
MAKING YOU STAY IN A HOSPITAL: 4
MAKING YOU WORRY: 1
FEELING LIKE A BURDEN TO FAMILY AND FRIENDS: 1
TOTAL_SCORE: 24

## 2018-03-22 NOTE — PROGRESS NOTES
Date: 3/22/2018    Time of Call: 10:30 AM     Diagnosis:  A fib     [ TORB ] Ordering provider: Dr Juan Eaton  Order: Schedule DCCV     Order received by: Lionel Molina RN     Follow-up/additional notes: Patient called with recommendations from Maria Esther Galarza.  Instructed patient to check in at 9:30 in the gold waiting room, nothing to eat or drink 6 hours prior.  Instructed patient to take her Eliquis and Atenolol morning off.  Patient to present to ER in the meantime if she becomes unstable.  Amelia states that she understands information provided and will call with any further questions or concerns.

## 2018-03-22 NOTE — PROGRESS NOTES
Received call from Amelia.  She states that she is in A fib, -130's.  She denies any shortness of breath, /84, O2 Sats 97%.  She states that she feels it in her temples and when she lays down.  She notes that she was recently cardioverted.  She will be taking her Atenolol and Eliquis this AM.  She has not eaten or drank anything this AM.    Called and spoke to ELIZABETH Verde. May consider a cardioversion today or tomorrow.  She will check to see what medications patient has tried in the past to see what else Amelia can try to remain out of A fib.    Called Amelia back, asked her to remain NPO and to call if she develops any shortness of breath or hypotension.  Amelia states that she is stable right now and will call with any changes in her status.  Will call patient back to today with a plan of care.    Lionel Molina RN  RN Care Coordinator  [facility]  [phone]

## 2018-03-23 ENCOUNTER — ANESTHESIA EVENT (OUTPATIENT)
Dept: SURGERY | Facility: CLINIC | Age: 58
End: 2018-03-23
Payer: COMMERCIAL

## 2018-03-23 ENCOUNTER — APPOINTMENT (OUTPATIENT)
Dept: MEDSURG UNIT | Facility: CLINIC | Age: 58
End: 2018-03-23
Attending: PHYSICIAN ASSISTANT
Payer: COMMERCIAL

## 2018-03-23 ENCOUNTER — SURGERY (OUTPATIENT)
Age: 58
End: 2018-03-23

## 2018-03-23 ENCOUNTER — HOSPITAL ENCOUNTER (OUTPATIENT)
Dept: CARDIOLOGY | Facility: CLINIC | Age: 58
End: 2018-03-23
Attending: INTERNAL MEDICINE
Payer: COMMERCIAL

## 2018-03-23 ENCOUNTER — HOSPITAL ENCOUNTER (OUTPATIENT)
Facility: CLINIC | Age: 58
Discharge: HOME | End: 2018-03-23
Attending: INTERNAL MEDICINE | Admitting: INTERNAL MEDICINE
Payer: COMMERCIAL

## 2018-03-23 ENCOUNTER — ANESTHESIA (OUTPATIENT)
Dept: SURGERY | Facility: CLINIC | Age: 58
End: 2018-03-23
Payer: COMMERCIAL

## 2018-03-23 ENCOUNTER — OFFICE VISIT (OUTPATIENT)
Dept: CARDIOLOGY | Facility: CLINIC | Age: 58
End: 2018-03-23
Attending: PHYSICIAN ASSISTANT
Payer: COMMERCIAL

## 2018-03-23 VITALS
DIASTOLIC BLOOD PRESSURE: 90 MMHG | HEART RATE: 60 BPM | SYSTOLIC BLOOD PRESSURE: 125 MMHG | OXYGEN SATURATION: 98 % | RESPIRATION RATE: 16 BRPM

## 2018-03-23 VITALS
RESPIRATION RATE: 18 BRPM | OXYGEN SATURATION: 98 % | SYSTOLIC BLOOD PRESSURE: 100 MMHG | DIASTOLIC BLOOD PRESSURE: 74 MMHG | HEART RATE: 62 BPM

## 2018-03-23 VITALS
DIASTOLIC BLOOD PRESSURE: 65 MMHG | OXYGEN SATURATION: 93 % | WEIGHT: 123.7 LBS | HEART RATE: 96 BPM | SYSTOLIC BLOOD PRESSURE: 89 MMHG | HEIGHT: 58 IN | BODY MASS INDEX: 25.97 KG/M2

## 2018-03-23 DIAGNOSIS — I50.30 (HFPEF) HEART FAILURE WITH PRESERVED EJECTION FRACTION (H): ICD-10-CM

## 2018-03-23 DIAGNOSIS — I50.32 CHRONIC DIASTOLIC CONGESTIVE HEART FAILURE (H): Primary | ICD-10-CM

## 2018-03-23 DIAGNOSIS — I48.91 ATRIAL FIBRILLATION (H): ICD-10-CM

## 2018-03-23 LAB
ALBUMIN SERPL-MCNC: 3.7 G/DL (ref 3.4–5)
ALP SERPL-CCNC: 136 U/L (ref 40–150)
ALT SERPL W P-5'-P-CCNC: 57 U/L (ref 0–50)
ANION GAP SERPL CALCULATED.3IONS-SCNC: 9 MMOL/L (ref 3–14)
AST SERPL W P-5'-P-CCNC: 28 U/L (ref 0–45)
BILIRUB SERPL-MCNC: 0.7 MG/DL (ref 0.2–1.3)
BUN SERPL-MCNC: 14 MG/DL (ref 7–30)
CALCIUM SERPL-MCNC: 9.2 MG/DL (ref 8.5–10.1)
CHLORIDE SERPL-SCNC: 108 MMOL/L (ref 94–109)
CO2 SERPL-SCNC: 22 MMOL/L (ref 20–32)
CREAT SERPL-MCNC: 0.76 MG/DL (ref 0.52–1.04)
ERYTHROCYTE [DISTWIDTH] IN BLOOD BY AUTOMATED COUNT: 13.9 % (ref 10–15)
GFR SERPL CREATININE-BSD FRML MDRD: 78 ML/MIN/1.7M2
GLUCOSE SERPL-MCNC: 92 MG/DL (ref 70–99)
HCT VFR BLD AUTO: 46.4 % (ref 35–47)
HGB BLD-MCNC: 15.2 G/DL (ref 11.7–15.7)
MCH RBC QN AUTO: 37.7 PG (ref 26.5–33)
MCHC RBC AUTO-ENTMCNC: 32.8 G/DL (ref 31.5–36.5)
MCV RBC AUTO: 115 FL (ref 78–100)
PLATELET # BLD AUTO: 87 10E9/L (ref 150–450)
POTASSIUM SERPL-SCNC: 4 MMOL/L (ref 3.4–5.3)
PROT SERPL-MCNC: 6.2 G/DL (ref 6.8–8.8)
RBC # BLD AUTO: 4.03 10E12/L (ref 3.8–5.2)
SODIUM SERPL-SCNC: 139 MMOL/L (ref 133–144)
WBC # BLD AUTO: 3.4 10E9/L (ref 4–11)

## 2018-03-23 PROCEDURE — 80053 COMPREHEN METABOLIC PANEL: CPT | Performed by: NURSE PRACTITIONER

## 2018-03-23 PROCEDURE — 99214 OFFICE O/P EST MOD 30 MIN: CPT | Mod: ZP | Performed by: PHYSICIAN ASSISTANT

## 2018-03-23 PROCEDURE — 92960 CARDIOVERSION ELECTRIC EXT: CPT

## 2018-03-23 PROCEDURE — 92960 CARDIOVERSION ELECTRIC EXT: CPT | Mod: GC | Performed by: INTERNAL MEDICINE

## 2018-03-23 PROCEDURE — 37000008 ZZH ANESTHESIA TECHNICAL FEE, 1ST 30 MIN

## 2018-03-23 PROCEDURE — 40000166 ZZH STATISTIC PP CARE STAGE 1

## 2018-03-23 PROCEDURE — 40000065 ZZH STATISTIC EKG NON-CHARGEABLE

## 2018-03-23 PROCEDURE — 93005 ELECTROCARDIOGRAM TRACING: CPT

## 2018-03-23 PROCEDURE — G0463 HOSPITAL OUTPT CLINIC VISIT: HCPCS | Mod: 25,ZF

## 2018-03-23 PROCEDURE — 25000128 H RX IP 250 OP 636: Performed by: NURSE ANESTHETIST, CERTIFIED REGISTERED

## 2018-03-23 PROCEDURE — 85027 COMPLETE CBC AUTOMATED: CPT | Performed by: NURSE PRACTITIONER

## 2018-03-23 PROCEDURE — 25000125 ZZHC RX 250: Performed by: NURSE ANESTHETIST, CERTIFIED REGISTERED

## 2018-03-23 RX ORDER — SODIUM CHLORIDE, SODIUM LACTATE, POTASSIUM CHLORIDE, CALCIUM CHLORIDE 600; 310; 30; 20 MG/100ML; MG/100ML; MG/100ML; MG/100ML
INJECTION, SOLUTION INTRAVENOUS CONTINUOUS PRN
Status: DISCONTINUED | OUTPATIENT
Start: 2018-03-23 | End: 2018-03-23

## 2018-03-23 RX ORDER — ONDANSETRON 2 MG/ML
INJECTION INTRAMUSCULAR; INTRAVENOUS PRN
Status: DISCONTINUED | OUTPATIENT
Start: 2018-03-23 | End: 2018-03-23

## 2018-03-23 RX ORDER — LIDOCAINE 40 MG/G
CREAM TOPICAL
Status: DISCONTINUED | OUTPATIENT
Start: 2018-03-23 | End: 2018-03-24 | Stop reason: HOSPADM

## 2018-03-23 RX ADMIN — ONDANSETRON 4 MG: 2 INJECTION INTRAMUSCULAR; INTRAVENOUS at 11:05

## 2018-03-23 RX ADMIN — SODIUM CHLORIDE, POTASSIUM CHLORIDE, SODIUM LACTATE AND CALCIUM CHLORIDE: 600; 310; 30; 20 INJECTION, SOLUTION INTRAVENOUS at 11:03

## 2018-03-23 RX ADMIN — SODIUM CHLORIDE, POTASSIUM CHLORIDE, SODIUM LACTATE AND CALCIUM CHLORIDE: 600; 310; 30; 20 INJECTION, SOLUTION INTRAVENOUS at 11:23

## 2018-03-23 RX ADMIN — METHOHEXITAL SODIUM 50 MG: 500 INJECTION, POWDER, LYOPHILIZED, FOR SOLUTION INTRAMUSCULAR; INTRAVENOUS; RECTAL at 11:06

## 2018-03-23 ASSESSMENT — PAIN SCALES - GENERAL: PAINLEVEL: NO PAIN (0)

## 2018-03-23 ASSESSMENT — ENCOUNTER SYMPTOMS: DYSRHYTHMIAS: 1

## 2018-03-23 NOTE — ANESTHESIA POSTPROCEDURE EVALUATION
Patient: Amelia Michel    Procedure(s):  Anesthesia Cardioversion     Diagnosis:Atrial Fibrillation   Diagnosis Additional Information: No value filed.    Anesthesia Type:  No value filed.    Note:  Anesthesia Post Evaluation    Patient location during evaluation: Echo Lab  Patient participation: Able to fully participate in evaluation  Level of consciousness: awake and alert  Pain management: adequate  Airway patency: patent  Cardiovascular status: blood pressure returned to baseline and hemodynamically stable  Respiratory status: room air  Hydration status: euvolemic  PONV: none     Anesthetic complications: None          Last vitals:  There were no vitals filed for this visit.      Electronically Signed By: Victor Manuel Reid MD  March 23, 2018  11:28 AM

## 2018-03-23 NOTE — LETTER
3/23/2018      RE: Amelia Michel  [address]  [place] [address]       Dear Colleague,    Thank you for the opportunity to participate in the care of your patient, Amelia Michel, at the [facility] at [facility]. Please see a copy of my visit note below.    CARDIOLOGY CLINIC   March 23, 2018    HPI: Amelia Michel is a 57 year old female with VSD s/p repair in 1969, HTN, RA on Plaquenil and prednisone, atrial arrhythmias who is referred to [facility] for enrollment post hospital stay. She has quite a history.    She was seen by Dr. Mcnulty back in 2015 for some edema. He did feel she likely had some diastolic dysfunction but no evidence of RV or LV failure. In May 2017 she presented to the ER with palpitations and was found to have atrial flutter. Echocardiogram then with EF 40-45%. She did have BRE/DCCV but reverted, also episode of SVT, discharged on Amiodarone and anticoagulation.     Late May she presented as an out of hospital cardiac arrest.  performed immediate CPR; EMS noted VF on monitor so she was defibrillated and received ROSC. Her coronary angiogram showed normal coronaries and she underwent therapeutic hypothermia. The exact etiology of her arrest was unclear and she was discharged on a LifeVest with plans for AICD in the future. Shortly thereafter, she was found to have lymphoma and started on treatment. 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     SHe was able to see EP as an outpatient in September to establish care. She was on dig and atenolol and doing well. No anticoagulation then due to chemo side effects. She had not tolerated sotalol or amio previously (though in retrospect some symptoms may have been unknown cancer). A Zio Patch was ordered which subsequently revealed mostly sinus  with some PACs.     She saw EP again in January 2018. Chemo had since been completed and lymphoma in remission. No significant symptoms. Eliquis resumed, digoxin stopped secondary to cross reaction with plaquenil.      On 3/2 she presented to the ER with dyspnea and palpitations, suspecting atrial fibrillation. Was found in A fib with RVR rate 190s. She underwent chemical and electrical cardioversion with reversion to sinus rhythm and was discharged to home. She saw her PCP a few days later with complaints of dyspnea and weight gain and was eventually admitted. She was noted to have acute heart failure with congestive hepatopathy (). She was diuresed with IV lasix but discharged home on spironolactone 25mg daily. From a rhythm standpoint she reverted again to afib with RVR and underwent cardioversion. A cardiac MRI also showed pericarditis with loculated pericardial effusion and she was started on colchicine.    Early yesterday morning (around 3am) she awoke with palpitations and pounding headache, HR in the 130s. She spoke to cardiology triage and EP arranged her for cardioversion today. She did have a successful DCCV this morning.    Overall she denies any significant changes in her breathing. She is not reporting PND or orthopnea. She does have lymphedema history but notes it's been much improved- she continues to wear her socks. Abdominal bloating is also  Improved. Her home weight was 119lbs yesterday and 116lbs today. She notes having diarrhea yesterday which she attributes to the colchicine. She is trying to maintain her weight around 120lbs.     PAST MEDICAL HISTORY:  RA  Atrial arrhythmias  Diffuse large B cell lymphoma  Cardiac arrest  Hypertension    FAMILY HISTORY:  Family History   Problem Relation Age of Onset     Breast Cancer Mother      Hypertension Mother      Alzheimer Disease Mother      Hypertension Father      Blood Disease Father      Lymphoa     Circulatory Father      A Fib      DIABETES Paternal Grandmother        SOCIAL HISTORY:  Social History     Social History     Marital status:      Spouse name: Romeo Michel     Number of children: 0     Years of education: N/A     Occupational History      [employer]     Social History Main Topics     Smoking status: Never Smoker     Smokeless tobacco: Never Used     Alcohol use Yes      Comment: Glass of wine two evenings a night     Drug use: No     Sexual activity: Not on file     Other Topics Concern     Parent/Sibling W/ Cabg, Mi Or Angioplasty Before 65f 55m? No     Social History Narrative       CURRENT MEDICATIONS:  Current Outpatient Prescriptions   Medication Sig Dispense Refill     hydroxychloroquine (PLAQUENIL) 200 MG tablet Take 1 tablet (200 mg) by mouth 2 times daily 60 tablet 5     colchicine 0.6 MG tablet Take 1 tablet (0.6 mg) by mouth daily 30 tablet 0     spironolactone (ALDACTONE) 25 MG tablet Take 1 tablet (25 mg) by mouth daily 30 tablet 0     tolterodine (DETROL LA) 4 MG 24 hr capsule Take 4 mg by mouth daily        atenolol (TENORMIN) 25 MG tablet Take 1 tablet (25 mg) by mouth 2 times daily 60 tablet 3     apixaban ANTICOAGULANT (ELIQUIS) 5 MG tablet Take 1 tablet (5 mg) by mouth 2 times daily 180 tablet 3     order for DME Equipment being ordered: BP cuff 1 Device 1     predniSONE (DELTASONE) 5 MG tablet Take 1 tablet (5 mg) by mouth daily 90 tablet 2     gabapentin (NEURONTIN) 100 MG capsule Take 2 capsules (200 mg) by mouth 3 times daily 180 capsule 3     acetaminophen (TYLENOL) 325 MG tablet Take 2 tablets (650 mg) by mouth every 4 hours as needed for mild pain, fever or headaches       calcium polycarbophil (FIBERCON) 625 MG tablet Take 1 tablet by mouth 2 times daily       calcium-vitamin D (CALTRATE) 600-400 MG-UNIT per tablet Take 2 tablets by mouth every morning 60 tablet 0     multivitamin, therapeutic with minerals (THERA-VIT-M) TABS tablet Take 1 tablet by mouth daily 30 each 0      "ascorbic acid 500 MG TABS Take 1 tablet (500 mg) by mouth daily 30 tablet 0     ROS:   Constitutional: No fever, chills, or sweats.   ENT: No visual disturbance, epistaxis, sore throat.   Respiratory: No dyspnea, cough, hemoptysis.   Cardiovascular: As per HPI.   GI: No nausea, vomiting, hematemesis, melena, or hematochezia.   : No dysuria, or hematuria. +urinary frequency  Integument: No rash, pururitis.   Psychiatric: No depression, anxiety, insomnia.   Neuro: No weakness, tingling, numbness, dysphasia.   Endocrinology: + heat/cold intolerance, No polydipsia.   Musculoskeletal: +chronic muscle/joint aches- recently right shoulder- improving on colchicine    EXAM:  BP (!) 89/65 (BP Location: Right arm, Patient Position: Chair, Cuff Size: Adult Regular)  Pulse 96  Ht 1.473 m (4' 10\")  Wt 56.1 kg (123 lb 11.2 oz)  SpO2 93%  BMI 25.85 kg/m2  General: seated in chair, in NAD, frail appearing  HEENT: head normocephalic/atraumatic, anicteric sclera, EOMI  Neck: JVP 8cm, carotid pulses 2+, no carotid bruit  Heart: RRR, normal S1/S2, 3/6 systolic murmur noted  Lungs: decreased breath sounds at bases with crackles, apices CTA, no wheezing or rales  Abdomen: distended, +BS, soft, NT  Extremities: no LE edema, no clubbing  Neurological: normal speech    Labs:  CBC RESULTS:  Lab Results   Component Value Date    WBC 3.4 (L) 03/23/2018    RBC 4.03 03/23/2018    HGB 15.2 03/23/2018    HCT 46.4 03/23/2018     (H) 03/23/2018    MCH 37.7 (H) 03/23/2018    MCHC 32.8 03/23/2018    RDW 13.9 03/23/2018    PLT 87 (L) 03/23/2018     CMP RESULTS:  Lab Results   Component Value Date     03/23/2018    POTASSIUM 4.0 03/23/2018    CHLORIDE 108 03/23/2018    CO2 22 03/23/2018    ANIONGAP 9 03/23/2018    GLC 92 03/23/2018    BUN 14 03/23/2018    CR 0.76 03/23/2018    GFRESTIMATED 78 03/23/2018    GFRESTBLACK >90 03/23/2018    VIKTORIYA 9.2 03/23/2018    BILITOTAL 0.7 03/23/2018    ALBUMIN 3.7 03/23/2018    ALKPHOS 136 03/23/2018 "    ALT 57 (H) 03/23/2018    AST 28 03/23/2018      Cardiac Studies:  MRI 3/8/18: LV normal cavity size and wall thickness, LVEF 55%, tethering of the inferior and lateral segmetns. RV normal in cavity size, function normal. Severely dilated atria. Localized pericardial effusion with pericardial restraint consistent with pericarditis.     Assessment and Plan: Amelia Michel is a very pleasant 57 year old female with long standing RA, lymphoma in remission, prior cardiac arrest and recurrent atrial arrhythmias who presents to [facility] for follow up. Her EF currently is normal per MRI. Likely she has diastolic heart failure which is worsened by the tachycardia of the atrial arrhythmias. She reverted again yesterday and was cardioverted this morning. She is somewhat wondering what the long term plan is regarding this. She states that she felt best from the atrial arrhythmia standpoint when she was on dig and atenolol. She has some PTSD regarding amio and metoprolol. She feels that the spironolactone is working, maybe not as well as the lasix did in the hospital. Her weight is maintaining currently, though from records it seems she is up around 5 pounds since January clinic visit, though her appetite is improving. We discussed continuing to monitor daily weights and when to notify us.     1. Chronic diastolic heart failure, Stage C, NYHA Class II  Volume status- by weight it seems she may be slightly volume up, but I did not appreciate any significant neck veins and she is stable. We opted to continue as is, should her weight hit >120lbs at home then call as we would then consider adding low dose lasix 20mg daily or even prn.   Aldosterone antagonist- on spironolactone 25mg daily, potassium stable  Hypertension- blood pressure controlled, low today secondary to sedation earlier today    2. Atrial flutter-  On anticoagulation and atenolol, s/p DCCV with sinus rhythm now. Her atria are large and unlikely to maintain  long term sinus, goal is for rate control. Will defer to EP for further evaluation, I did pass on patient's expression that she felt she did better on the digoxin.     Follow Up: [facility] in 1 month.      Arcelia Souza PA-C  [facility]

## 2018-03-23 NOTE — PROGRESS NOTES
Pt tolerated PO, voided and ambulated without difficulty. VSS, pt denies pain. Pt verbalized understanding of discharge instructions. IV removed. Pt discharged to home with

## 2018-03-23 NOTE — PROGRESS NOTES
Patient returned from echo post successful CV.  Denies pain.   with her.  Offered po fluids & food.

## 2018-03-23 NOTE — NURSING NOTE
Chief Complaint   Patient presents with     New Patient     New [facility]; 57yr old female with a h/o Chronic diastolic heart failure/HFpEF presenting post hospitalization for follow-up with labs prior     Vitals were taken and medications were reconciled.  ROSA Zelaya  1:22 PM

## 2018-03-23 NOTE — OP NOTE
CARDIOVERSION    PROCEDURE:  direct current cardioversion  PROCEDURE DATE: 3/23/2018     Pre-procedure diagnosis:  Paroxysmal AF  Post-procedure diagnosis: Successful DC cardioversion to sinus rhythm  Complications:  none    BRIEF CLINICAL HISTORY:  56yo F with PMH of DLBCL in remission (last PET scan 1/15/2018), RA, atrial tachyarrythmias, history of Afib and prior cardiac arrest (which eventually led to her diagnosis of DLBCL) who had recurrent AF yesterday. She called clinic yesterday and was scheduled for DCCV today.      PROCEDURE:  The patient arrived at the Echo Laboratory in a fasting, non-sedated state.  Informed consent was previously obtained from patient, who understood indications, risks, and benefits of the procedure. She's been taking Apixaban > 1 month. With help from Anesthesia, patient was deeply sedated.  150J of synchronized biphasic shock was delivered through the cardiac monitor, and the patient was successfully converted to normal sinus rhythm.  After sedation wore off, patient awoke and remained neurologically intact.  The patient tolerated the procedure well from a hemodynamic and respiratory standpoint without any complications.  She was observed in the Echo Laboratory and then discharged to home after sedation wore off and she was ambulatory.    IMPRESSION:  1.  Successful direct current cardioversion with 150J biphasic shock.    RECOMMENDATIONS/PLANS:  1.   Follow-up with EP clinic  2.   Continue anticoagulation    Sindy Hernandez  EP fellow  I appreciated the opportunity to see and assess Mrs Michel and direct the procedure described above with EP Fellow Dr Hernandez. The above note summarizes my findings and current recommendations. Please do not hesitate to contact me if you have any questions or concerns.    Pj Trent MD [facility]   Pager [phone]  Office [phone]

## 2018-03-23 NOTE — PROGRESS NOTES
Pt arrived in ECHO department  for scheduled DCCV.   Procedure explained, questions answered and consent signed. Discharge instructions discussed with patient.  Anesthesia gave pt 50 mg IV brevitol for sedation and pt was DCCV at 150 Joules to a SR.  Pt was transferred to unit 2A after awake and VSS.     Report given to Mery POTTER.

## 2018-03-23 NOTE — MR AVS SNAPSHOT
"              After Visit Summary   3/23/2018    Amelia Michel    MRN: [id number]           Patient Information     Date Of Birth          1960        Visit Information        Provider Department      3/23/2018 2:00 PM Arcelia Souza PA-C [facility]        Care Instructions    You were seen today in the Cardiovascular Clinic at the [facility].     Cardiology Providers you saw during your visit: Arcelia Souza PA-C      1. No changes in your medications  2. Continue to follow your daily weights closely and call us if it increases to >120lbs    Please make a follow-up CORE/heart failure appt in 1 month       Please limit your fluid intake to 2 L (64 ounces) daily.  2 Liters a day = 8.5 cups, or 72 ounces.  Please limit your salt intake to 2 grams a day or less.    If you gain 2# in 24 hours or 5# in one week call your nurse so we can adjust your medications as needed over the phone.    Please feel free to call with any questions or concerns.      Questions and [phone].   First press #1 for the [employer] and then press #3 for \"Medical Questions\" to reach us Cardiology Nurses.     On Call Cardiologist for after hours or on weekends: [phone]   option #4 and ask to speak to the on-call Cardiologist. Inform them you are a CORE/heart failure patient at the [facility].        If you need a medication refill please contact your pharmacy.  Please allow 3 business days for your refill to be completed.  _______________________________________________________  C.O.R.E. CLINIC Cardiomyopathy, Optimization, Rehabilitation, Education   The C.O.R.E. CLINIC is a heart failure specialty clinic within the [facility] where you will work with specialized nurse practitioners dedicated to helping patients with heart failure carefully adjust medications, receive education, and learn who and when to call if symptoms develop. They specialize " in helping you better understand your condition, slow the progression of your disease, improve the length and quality of your life, help you detect future heart problems before they become life threatening, and avoid hospitalizations.  As always, thank you for trusting us with your health care needs!              Follow-ups after your visit        Your next 10 appointments already scheduled     Mar 27, 2018 11:30 AM CDT   Cancer Rehab Treatment with Ana Loera PT   [facility]    [address] [place] [address]   [phone]            Mar 29, 2018  4:30 PM CDT   (Arrive by 4:15 PM)   NEW HAND with Kevin Sheldon MD   [facility] ([facility])    [address]  4th Floor  [facility] [address] [phone]            Mar 30, 2018 10:00 AM CDT   (Arrive by 9:45 AM)   Return Visit with Pj Cunha MD   [facility] ([facility])    [address]  [address]  [facility] [address]   [phone]            Apr 03, 2018 11:30 AM CDT   Cancer Rehab Treatment with Ana Loera, PT   [facility]    [address] [place] [address]   [phone]            Apr 06, 2018  9:30 AM CDT   (Arrive by 9:15 AM)   MR MYOCARDIUM W CONTRAST with UUMR4, UU CV MR NURSE   [facility] ([facility])    [address] [address] [phone]           Take your medicines as usual, unless your doctor tells you not to. Bring a list of your current medicines to your exam (including vitamins, minerals and over-the-counter drugs).  You may or may not receive intravenous (IV) contrast for this exam pending the discretion of  the Radiologist.  You do not need to do anything special to prepare.  The MRI machine uses a strong magnet. Please wear clothes without metal (snaps, zippers). A sweatsuit works well, or we may give you a hospital gown.  Please remove any body piercings and hair extensions before you arrive. You will also remove watches, jewelry, hairpins, wallets, dentures, partial dental plates and hearing aids. You may wear contact lenses, and you may be able to wear your rings. We have a safe place to keep your personal items, but it is safer to leave them at home.  **IMPORTANT** THE INSTRUCTIONS BELOW ARE ONLY FOR THOSE PATIENTS WHO HAVE BEEN PRESCRIBED SEDATION OR GENERAL ANESTHESIA DURING THEIR MRI PROCEDURE:  IF YOUR DOCTOR PRESCRIBED ORAL SEDATION (take medicine to help you relax during your exam):   You must get the medicine from your doctor (oral medication) before you arrive. Bring the medicine to the exam. Do not take it at home. You ll be told when to take it upon arriving for your exam.   Arrive one hour early. Bring someone who can take you home after the test. Your medicine will make you sleepy. After the exam, you may not drive, take a bus or take a taxi by yourself.  IF YOUR DOCTOR PRESCRIBED IV SEDATION:   Arrive one hour early. Bring someone who can take you home after the test. Your medicine will make you sleepy. After the exam, you may not drive, take a bus or take a taxi by yourself.   No eating 6 hours before your exam. You may have clear liquids up until 4 hours before your exam. (Clear liquids include water, clear tea, black coffee and fruit juice without pulp.)  IF YOUR DOCTOR PRESCRIBED ANESTHESIA (be asleep for your exam):   Arrive 1 1/2 hours early. Bring someone who can take you home after the test. You may not drive, take a bus or take a taxi by yourself.   No eating 8 hours before your exam. You may have clear liquids up until 4 hours before your exam. (Clear liquids include water, clear tea, black  coffee and fruit juice without pulp.)   You will spend four to five hours in the recovery room.  Please call the Imaging Department at your exam site with any questions.            Apr 10, 2018 12:45 PM CDT   Cancer Rehab Treatment with Ana Loera PT   [facility] ([facility])    [address] [place] [address]  1st Floor  [address]  [facility] [address]   [phone]            Apr 11, 2018 10:00 AM CDT   (Arrive by 9:45 AM)   RETURN ARRHYTHMIA with Juan Eaton MD   [facility] ([facility])    [address]  [address]  [facility] [id number] [phone]            May 24, 2018 12:00 PM CDT   (Arrive by 11:45 AM)   CT CHEST ABDOMEN PELVIS W/O & W CONTRAST with UCCT1   [facility] CT ([facility])    [address]  1st Floor  [facility] [id number] [phone]           Please bring any scans or X-rays taken at other hospitals, if similar tests were done. Also bring a list of your medicines, including vitamins, minerals and over-the-counter drugs. It is safest to leave personal items at home.  Be sure to tell your doctor:   If you have any allergies.   If there s any chance you are pregnant.   If you are breastfeeding.  You may have contrast for this exam. To prepare:   Do not eat or drink for 2 hours before your exam. If you need to take medicine, you may take it with small sips of water. (We may ask you to take liquid medicine as well.)   The day before your exam, drink extra fluids at least six 8-ounce glasses (unless your doctor tells you to restrict your fluids).   You will be given instructions on how to drink the contrast.  Patients over 70 or patients with diabetes or kidney problems:   If you haven t had a blood test (creatinine test) within the last 30 days, the Cardiologist/Radiologist may require you to get this test prior to your exam.  If  you have diabetes:   Continue to take your metformin medication on the day of your exam  Please wear loose clothing, such as a sweat suit or jogging clothes. Avoid snaps, zippers and other metal. We may ask you to undress and put on a hospital gown.  If you have any questions, please call the Imaging Department where you will have your exam.            May 24, 2018  3:00 PM T   [facility] Lab Draw with  [facility] LAB DRAW   [facility] Lab Draw ([facility])    [address]  [address]  [facility] [address] [phone]              Future tests that were ordered for you today     Open Standing Orders        Priority Remaining Interval Expires Ordered    Comprehensive metabolic panel Routine 1/1 AM DRAW  3/23/2018          Open Future Orders        Priority Expected Expires Ordered    HCG quantitative pregnancy Routine 3/23/2018 3/22/2019 3/22/2018    Cardioversion Routine  3/22/2019 3/22/2018    Cardioversion Routine  3/22/2019 3/22/2018            Who to contact     If you have questions or need follow up information about today's clinic visit or your schedule please contact [facility] directly at [phone].  Normal or non-critical lab and imaging results will be communicated to you by GeoPollhart, letter or phone within 4 business days after the clinic has received the results. If you do not hear from us within 7 days, please contact the clinic through Actifiot or phone. If you have a critical or abnormal lab result, we will notify you by phone as soon as possible.  Submit refill requests through [employer] or call your pharmacy and they will forward the refill request to us. Please allow 3 business days for your refill to be completed.          Additional Information About Your Visit        GeoPollhart Information     [employer] gives you secure access to your electronic health record. If you see a primary care provider, you can also send messages to your care team and make  "appointments. If you have questions, please call your primary care clinic.  If you do not have a primary care provider, please call [phone] and they will assist you.        Care EveryWhere ID     This is your Care EveryWhere ID. This could be used by other organizations to access your [facility] medical records  [id number]        Your Vitals Were     Pulse Height Pulse Oximetry BMI (Body Mass Index)          96 1.473 m (4' 10\") 93% 25.85 kg/m2         Blood Pressure from Last 3 Encounters:   03/23/18 125/90   03/23/18 (!) 89/65   03/23/18 100/74    Weight from Last 3 Encounters:   03/23/18 56.1 kg (123 lb 11.2 oz)   03/13/18 53.5 kg (118 lb)   03/07/18 59 kg (130 lb)              Today, you had the following     No orders found for display         Today's Medication Changes      Notice     This visit is during an admission. Changes to the med list made in this visit will be reflected in the After Visit Summary of the admission.             Primary Care Provider Office Phone # Fax #    Comfort Sabillon [phone] [address] [facility] [address]        Equal Access to Services     CATINA HEAD AH: Hadii laurita rodgerso Sogilbert, waaxda luqadaha, qaybta kaalmada adechelada, durga inman. So [facility] [phone].    ATENCIÓN: Si habla español, tiene a sarmiento disposición servicios gratuitos de asistencia lingüística. Llame al [phone].    We comply with applicable federal civil rights laws and [place] laws. We do not discriminate on the basis of race, color, national origin, age, disability, sex, sexual orientation, or gender identity.            Thank you!     Thank you for choosing [facility]  for your care. Our goal is always to provide you with excellent care. Hearing back from our patients is one way we can continue to improve our services. Please take a few minutes to complete the written survey that you may receive in the mail after your visit " with us. Thank you!             Your Updated Medication List - Protect others around you: Learn how to safely use, store and throw away your medicines at www.disposemymeds.org.      Notice     This visit is during an admission. Changes to the med list made in this visit will be reflected in the After Visit Summary of the admission.

## 2018-03-23 NOTE — ANESTHESIA CARE TRANSFER NOTE
Patient: Amelia Michel    Procedure(s):  Anesthesia Cardioversion     Diagnosis: Atrial Fibrillation   Diagnosis Additional Information: No value filed.    Anesthesia Type:   No value filed.     Note:  Airway :Nasal Cannula  Patient transferred to:Phase II  Comments: Patient in ECHO lab, awake and talking, airway patent,  RN at bedside.Handoff Report: Identifed the Patient, Identified the Reponsible Provider, Reviewed the pertinent medical history, Discussed the surgical course, Reviewed Intra-OP anesthesia mangement and issues during anesthesia, Set expectations for post-procedure period and Allowed opportunity for questions and acknowledgement of understanding      Vitals: (Last set prior to Anesthesia Care Transfer)    CRNA VITALS  3/23/2018 1052 - 3/23/2018 1130      3/23/2018             NIBP: (!)  81/63    Pulse: 60                Electronically Signed By: ELIZABETH Fong CRNA  March 23, 2018  11:30 AM

## 2018-03-23 NOTE — PATIENT INSTRUCTIONS
"You were seen today in the Cardiovascular Clinic at the [facility].     Cardiology Providers you saw during your visit: Arcelia Souza PA-C      1. No changes in your medications  2. Continue to follow your daily weights closely and call us if it increases to >120lbs    Please make a follow-up CORE/heart failure appt in 1 month       Please limit your fluid intake to 2 L (64 ounces) daily.  2 Liters a day = 8.5 cups, or 72 ounces.  Please limit your salt intake to 2 grams a day or less.    If you gain 2# in 24 hours or 5# in one week call your nurse so we can adjust your medications as needed over the phone.    Please feel free to call with any questions or concerns.      Questions and [phone].   First press #1 for the op5 and then press #3 for \"Medical Questions\" to reach us Cardiology Nurses.     On Call Cardiologist for after hours or on weekends: [phone]   option #4 and ask to speak to the on-call Cardiologist. Inform them you are a CORE/heart failure patient at the [facility].        If you need a medication refill please contact your pharmacy.  Please allow 3 business days for your refill to be completed.  _______________________________________________________  C.O.R.E. CLINIC Cardiomyopathy, Optimization, Rehabilitation, Education   The C.O.R.E. CLINIC is a heart failure specialty clinic within the [facility] where you will work with specialized nurse practitioners dedicated to helping patients with heart failure carefully adjust medications, receive education, and learn who and when to call if symptoms develop. They specialize in helping you better understand your condition, slow the progression of your disease, improve the length and quality of your life, help you detect future heart problems before they become life threatening, and avoid hospitalizations.  As always, thank you for trusting us with your health care " needs!

## 2018-03-23 NOTE — PROGRESS NOTES
CARDIOLOGY CLINIC   March 23, 2018    HPI: Amelia Michel is a 57 year old female with VSD s/p repair in 1969, HTN, RA on Plaquenil and prednisone, atrial arrhythmias who is referred to [facility] clinic for enrollment post hospital stay. She has quite a history.    She was seen by Dr. Mcnulty back in 2015 for some edema. He did feel she likely had some diastolic dysfunction but no evidence of RV or LV failure. In May 2017 she presented to the ER with palpitations and was found to have atrial flutter. Echocardiogram then with EF 40-45%. She did have BRE/DCCV but reverted, also episode of SVT, discharged on Amiodarone and anticoagulation.     Late May she presented as an out of hospital cardiac arrest.  performed immediate CPR; EMS noted VF on monitor so she was defibrillated and received ROSC. Her coronary angiogram showed normal coronaries and she underwent therapeutic hypothermia. The exact etiology of her arrest was unclear and she was discharged on a LifeVest with plans for AICD in the future. Shortly thereafter, she was found to have lymphoma and started on treatment. 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     SHe was able to see EP as an outpatient in September to establish care. She was on dig and atenolol and doing well. No anticoagulation then due to chemo side effects. She had not tolerated sotalol or amio previously (though in retrospect some symptoms may have been unknown cancer). A Zio Patch was ordered which subsequently revealed mostly sinus with some PACs.     She saw EP again in January 2018. Chemo had since been completed and lymphoma in remission. No significant symptoms. Eliquis resumed, digoxin stopped secondary to cross reaction with plaquenil.      On 3/2 she presented to the ER with dyspnea and palpitations, suspecting atrial fibrillation. Was  found in A fib with RVR rate 190s. She underwent chemical and electrical cardioversion with reversion to sinus rhythm and was discharged to home. She saw her PCP a few days later with complaints of dyspnea and weight gain and was eventually admitted. She was noted to have acute heart failure with congestive hepatopathy (). She was diuresed with IV lasix but discharged home on spironolactone 25mg daily. From a rhythm standpoint she reverted again to afib with RVR and underwent cardioversion. A cardiac MRI also showed pericarditis with loculated pericardial effusion and she was started on colchicine.    Early yesterday morning (around 3am) she awoke with palpitations and pounding headache, HR in the 130s. She spoke to cardiology triage and EP arranged her for cardioversion today. She did have a successful DCCV this morning.    Overall she denies any significant changes in her breathing. She is not reporting PND or orthopnea. She does have lymphedema history but notes it's been much improved- she continues to wear her socks. Abdominal bloating is also  Improved. Her home weight was 119lbs yesterday and 116lbs today. She notes having diarrhea yesterday which she attributes to the colchicine. She is trying to maintain her weight around 120lbs.     PAST MEDICAL HISTORY:  RA  Atrial arrhythmias  Diffuse large B cell lymphoma  Cardiac arrest  Hypertension    FAMILY HISTORY:  Family History   Problem Relation Age of Onset     Breast Cancer Mother      Hypertension Mother      Alzheimer Disease Mother      Hypertension Father      Blood Disease Father      Lymphoa     Circulatory Father      A Fib     DIABETES Paternal Grandmother        SOCIAL HISTORY:  Social History     Social History     Marital status:      Spouse name: Romeo Michel     Number of children: 0     Years of education: N/A     Occupational History      [employer]     Social History Main Topics     Smoking status: Never  Smoker     Smokeless tobacco: Never Used     Alcohol use Yes      Comment: Glass of wine two evenings a night     Drug use: No     Sexual activity: Not on file     Other Topics Concern     Parent/Sibling W/ Cabg, Mi Or Angioplasty Before 65f 55m? No     Social History Narrative       CURRENT MEDICATIONS:  Current Outpatient Prescriptions   Medication Sig Dispense Refill     hydroxychloroquine (PLAQUENIL) 200 MG tablet Take 1 tablet (200 mg) by mouth 2 times daily 60 tablet 5     colchicine 0.6 MG tablet Take 1 tablet (0.6 mg) by mouth daily 30 tablet 0     spironolactone (ALDACTONE) 25 MG tablet Take 1 tablet (25 mg) by mouth daily 30 tablet 0     tolterodine (DETROL LA) 4 MG 24 hr capsule Take 4 mg by mouth daily        atenolol (TENORMIN) 25 MG tablet Take 1 tablet (25 mg) by mouth 2 times daily 60 tablet 3     apixaban ANTICOAGULANT (ELIQUIS) 5 MG tablet Take 1 tablet (5 mg) by mouth 2 times daily 180 tablet 3     order for DME Equipment being ordered: BP cuff 1 Device 1     predniSONE (DELTASONE) 5 MG tablet Take 1 tablet (5 mg) by mouth daily 90 tablet 2     gabapentin (NEURONTIN) 100 MG capsule Take 2 capsules (200 mg) by mouth 3 times daily 180 capsule 3     acetaminophen (TYLENOL) 325 MG tablet Take 2 tablets (650 mg) by mouth every 4 hours as needed for mild pain, fever or headaches       calcium polycarbophil (FIBERCON) 625 MG tablet Take 1 tablet by mouth 2 times daily       calcium-vitamin D (CALTRATE) 600-400 MG-UNIT per tablet Take 2 tablets by mouth every morning 60 tablet 0     multivitamin, therapeutic with minerals (THERA-VIT-M) TABS tablet Take 1 tablet by mouth daily 30 each 0     ascorbic acid 500 MG TABS Take 1 tablet (500 mg) by mouth daily 30 tablet 0     ROS:   Constitutional: No fever, chills, or sweats.   ENT: No visual disturbance, epistaxis, sore throat.   Respiratory: No dyspnea, cough, hemoptysis.   Cardiovascular: As per HPI.   GI: No nausea, vomiting, hematemesis, melena, or  "hematochezia.   : No dysuria, or hematuria. +urinary frequency  Integument: No rash, pururitis.   Psychiatric: No depression, anxiety, insomnia.   Neuro: No weakness, tingling, numbness, dysphasia.   Endocrinology: + heat/cold intolerance, No polydipsia.   Musculoskeletal: +chronic muscle/joint aches- recently right shoulder- improving on colchicine    EXAM:  BP (!) 89/65 (BP Location: Right arm, Patient Position: Chair, Cuff Size: Adult Regular)  Pulse 96  Ht 1.473 m (4' 10\")  Wt 56.1 kg (123 lb 11.2 oz)  SpO2 93%  BMI 25.85 kg/m2  General: seated in chair, in NAD, frail appearing  HEENT: head normocephalic/atraumatic, anicteric sclera, EOMI  Neck: JVP 8cm, carotid pulses 2+, no carotid bruit  Heart: RRR, normal S1/S2, 3/6 systolic murmur noted  Lungs: decreased breath sounds at bases with crackles, apices CTA, no wheezing or rales  Abdomen: distended, +BS, soft, NT  Extremities: no LE edema, no clubbing  Neurological: normal speech    Labs:  CBC RESULTS:  Lab Results   Component Value Date    WBC 3.4 (L) 03/23/2018    RBC 4.03 03/23/2018    HGB 15.2 03/23/2018    HCT 46.4 03/23/2018     (H) 03/23/2018    MCH 37.7 (H) 03/23/2018    MCHC 32.8 03/23/2018    RDW 13.9 03/23/2018    PLT 87 (L) 03/23/2018     CMP RESULTS:  Lab Results   Component Value Date     03/23/2018    POTASSIUM 4.0 03/23/2018    CHLORIDE 108 03/23/2018    CO2 22 03/23/2018    ANIONGAP 9 03/23/2018    GLC 92 03/23/2018    BUN 14 03/23/2018    CR 0.76 03/23/2018    GFRESTIMATED 78 03/23/2018    GFRESTBLACK >90 03/23/2018    VIKTORIYA 9.2 03/23/2018    BILITOTAL 0.7 03/23/2018    ALBUMIN 3.7 03/23/2018    ALKPHOS 136 03/23/2018    ALT 57 (H) 03/23/2018    AST 28 03/23/2018      Cardiac Studies:  MRI 3/8/18: LV normal cavity size and wall thickness, LVEF 55%, tethering of the inferior and lateral segmetns. RV normal in cavity size, function normal. Severely dilated atria. Localized pericardial effusion with pericardial restraint " consistent with pericarditis.     Assessment and Plan: Amelia Michel is a very pleasant 57 year old female with long standing RA, lymphoma in remission, prior cardiac arrest and recurrent atrial arrhythmias who presents to [facility] clinic for follow up. Her EF currently is normal per MRI. Likely she has diastolic heart failure which is worsened by the tachycardia of the atrial arrhythmias. She reverted again yesterday and was cardioverted this morning. She is somewhat wondering what the long term plan is regarding this. She states that she felt best from the atrial arrhythmia standpoint when she was on dig and atenolol. She has some PTSD regarding amio and metoprolol. She feels that the spironolactone is working, maybe not as well as the lasix did in the hospital. Her weight is maintaining currently, though from records it seems she is up around 5 pounds since January clinic visit, though her appetite is improving. We discussed continuing to monitor daily weights and when to notify us.     1. Chronic diastolic heart failure, Stage C, NYHA Class II  Volume status- by weight it seems she may be slightly volume up, but I did not appreciate any significant neck veins and she is stable. We opted to continue as is, should her weight hit >120lbs at home then call as we would then consider adding low dose lasix 20mg daily or even prn.   Aldosterone antagonist- on spironolactone 25mg daily, potassium stable  Hypertension- blood pressure controlled, low today secondary to sedation earlier today    2. Atrial flutter-  On anticoagulation and atenolol, s/p DCCV with sinus rhythm now. Her atria are large and unlikely to maintain long term sinus, goal is for rate control. Will defer to EP for further evaluation, I did pass on patient's expression that she felt she did better on the digoxin.     Follow Up: [facility] in 1 month.      Arcelia Souza PA-C  [facility]

## 2018-03-26 LAB
INTERPRETATION ECG - MUSE: NORMAL
INTERPRETATION ECG - MUSE: NORMAL

## 2018-03-27 ENCOUNTER — HOSPITAL ENCOUNTER (OUTPATIENT)
Dept: PHYSICAL THERAPY | Facility: CLINIC | Age: 58
Setting detail: THERAPIES SERIES
End: 2018-03-27
Attending: INTERNAL MEDICINE
Payer: COMMERCIAL

## 2018-03-27 PROCEDURE — 97110 THERAPEUTIC EXERCISES: CPT | Mod: GP | Performed by: PHYSICAL THERAPIST

## 2018-03-27 PROCEDURE — 97535 SELF CARE MNGMENT TRAINING: CPT | Mod: GP | Performed by: PHYSICAL THERAPIST

## 2018-03-27 PROCEDURE — 97140 MANUAL THERAPY 1/> REGIONS: CPT | Mod: GP | Performed by: PHYSICAL THERAPIST

## 2018-03-27 PROCEDURE — 40000360 ZZHC STATISTIC PT CANCER REHAB VISIT: Performed by: PHYSICAL THERAPIST

## 2018-03-28 NOTE — PROGRESS NOTES
Erroneous encounter.    Patient saw Dr. Wilian Sheldon and was transferred to his clinic schedule.

## 2018-03-29 ENCOUNTER — OFFICE VISIT (OUTPATIENT)
Dept: ORTHOPEDICS | Facility: CLINIC | Age: 58
End: 2018-03-29
Payer: COMMERCIAL

## 2018-03-29 ENCOUNTER — TELEPHONE (OUTPATIENT)
Dept: CARDIOLOGY | Facility: CLINIC | Age: 58
End: 2018-03-29

## 2018-03-29 VITALS — HEIGHT: 58 IN | BODY MASS INDEX: 25.96 KG/M2 | WEIGHT: 123.68 LBS

## 2018-03-29 DIAGNOSIS — S41.102S WOUND OF LEFT UPPER EXTREMITY, SEQUELA: Primary | ICD-10-CM

## 2018-03-29 DIAGNOSIS — I50.22 CHRONIC SYSTOLIC HEART FAILURE (H): Primary | ICD-10-CM

## 2018-03-29 RX ORDER — FUROSEMIDE 20 MG
20 TABLET ORAL DAILY
Qty: 90 TABLET | Refills: 3 | Status: SHIPPED | OUTPATIENT
Start: 2018-03-29 | End: 2019-03-27

## 2018-03-29 NOTE — MR AVS SNAPSHOT
After Visit Summary   3/29/2018    Amelia Michel    MRN: [id number]           Patient Information     Date Of Birth          1960        Visit Information        Provider Department      3/29/2018 4:30 PM Kevin Sheldon MD [facility]        Today's Diagnoses     Wound of left upper extremity, sequela    -  1       Follow-ups after your visit        Your next 10 appointments already scheduled     Apr 03, 2018 11:30 AM CDT   Cancer Rehab Treatment with Ana Loera, EMMY   [facility] [place] Lymphedema ([facility])    [address]  1st Floor  [address]  [facility] [address]   [phone]            Apr 06, 2018  9:30 AM CDT   (Arrive by 9:15 AM)   MR MYOCARDIUM W CONTRAST with UUMEDUARDO, UU CV MR NURSE   [facility] [place], MRI ([facility])    [address] [facility] [id number] [phone]           Take your medicines as usual, unless your doctor tells you not to. Bring a list of your current medicines to your exam (including vitamins, minerals and over-the-counter drugs).  You may or may not receive intravenous (IV) contrast for this exam pending the discretion of the Radiologist.  You do not need to do anything special to prepare.  The MRI machine uses a strong magnet. Please wear clothes without metal (snaps, zippers). A sweatsuit works well, or we may give you a hospital gown.  Please remove any body piercings and hair extensions before you arrive. You will also remove watches, jewelry, hairpins, wallets, dentures, partial dental plates and hearing aids. You may wear contact lenses, and you may be able to wear your rings. We have a safe place to keep your personal items, but it is safer to leave them at home.  **IMPORTANT** THE INSTRUCTIONS BELOW ARE ONLY FOR THOSE PATIENTS WHO HAVE BEEN PRESCRIBED SEDATION OR GENERAL ANESTHESIA DURING THEIR MRI  PROCEDURE:  IF YOUR DOCTOR PRESCRIBED ORAL SEDATION (take medicine to help you relax during your exam):   You must get the medicine from your doctor (oral medication) before you arrive. Bring the medicine to the exam. Do not take it at home. You ll be told when to take it upon arriving for your exam.   Arrive one hour early. Bring someone who can take you home after the test. Your medicine will make you sleepy. After the exam, you may not drive, take a bus or take a taxi by yourself.  IF YOUR DOCTOR PRESCRIBED IV SEDATION:   Arrive one hour early. Bring someone who can take you home after the test. Your medicine will make you sleepy. After the exam, you may not drive, take a bus or take a taxi by yourself.   No eating 6 hours before your exam. You may have clear liquids up until 4 hours before your exam. (Clear liquids include water, clear tea, black coffee and fruit juice without pulp.)  IF YOUR DOCTOR PRESCRIBED ANESTHESIA (be asleep for your exam):   Arrive 1 1/2 hours early. Bring someone who can take you home after the test. You may not drive, take a bus or take a taxi by yourself.   No eating 8 hours before your exam. You may have clear liquids up until 4 hours before your exam. (Clear liquids include water, clear tea, black coffee and fruit juice without pulp.)   You will spend four to five hours in the recovery room.  Please call the Imaging Department at your exam site with any questions.            Apr 10, 2018 12:45 PM CDT   Cancer Rehab Treatment with Ana Loera, PT   [facility] [place] L[facility]ema ([facility])    [address] [place] [address]  1st Floor  [address]  [facility] [address] [phone]            Apr 11, 2018 10:00 AM CDT   (Arrive by 9:45 AM)   RETURN ARRHYTHMIA with Juan Eaton MD   [facility] ([facility])    [address]  [address]  [facility] [address] [phone]            May 24,  2018 12:00 PM CDT   (Arrive by 11:45 AM)   CT CHEST ABDOMEN PELVIS W/O & W CONTRAST with UCCT1   [facility] CT ([facility])    [address]  1st Floor  [facility] [id number] [phone]           Please bring any scans or X-rays taken at other hospitals, if similar tests were done. Also bring a list of your medicines, including vitamins, minerals and over-the-counter drugs. It is safest to leave personal items at home.  Be sure to tell your doctor:   If you have any allergies.   If there s any chance you are pregnant.   If you are breastfeeding.  You may have contrast for this exam. To prepare:   Do not eat or drink for 2 hours before your exam. If you need to take medicine, you may take it with small sips of water. (We may ask you to take liquid medicine as well.)   The day before your exam, drink extra fluids at least six 8-ounce glasses (unless your doctor tells you to restrict your fluids).   You will be given instructions on how to drink the contrast.  Patients over 70 or patients with diabetes or kidney problems:   If you haven t had a blood test (creatinine test) within the last 30 days, the Cardiologist/Radiologist may require you to get this test prior to your exam.  If you have diabetes:   Continue to take your metformin medication on the day of your exam  Please wear loose clothing, such as a sweat suit or jogging clothes. Avoid snaps, zippers and other metal. We may ask you to undress and put on a hospital gown.  If you have any questions, please call the Imaging Department where you will have your exam.            May 24, 2018  3:00 PM CDT   [facility] Lab Draw with  [facility] LAB DRAW   [facility] Lab Draw ([facility])    [address]  [address]  [facility] [id number] [phone]            May 24, 2018  3:30 PM CDT   RETURN ONC with Lenore Rodriguez MD   [facility] Blood and Marrow Transplant ([facility]  "[facility])    [address]  [address]  [facility] [address] [phone]            May 31, 2018  4:30 PM CDT   (Arrive by 4:15 PM)   Return Visit with Nayeli Pritchett MD   [facility] ([facility])    [address]  [address]  [facility] [id number] [phone]            Jun 06, 2018  8:45 AM CDT   (Arrive by 8:30 AM)   Cystoscopy with Vadim Sparks MD   [facility] ([facility])    [address]  4th Floor  [facility] [id number] [phone]              Who to contact     Please call your clinic at [phone] to:    Ask questions about your health    Make or cancel appointments    Discuss your medicines    Learn about your test results    Speak to your doctor            Additional Information About Your Visit        [employer] Information     [employer] gives you secure access to your electronic health record. If you see a primary care provider, you can also send messages to your care team and make appointments. If you have questions, please call your primary care clinic.  If you do not have a primary care provider, please call [phone] and they will assist you.      [employer] is an electronic gateway that provides easy, online access to your medical records. With [employer], you can request a clinic appointment, read your test results, renew a prescription or communicate with your care team.     To access your existing account, please contact your [facility] or call [phone] for assistance.        Care EveryWhere ID     This is your Care EveryWhere ID. This could be used by other organizations to access your [facility] medical records  [id number]        Your Vitals Were     Height BMI (Body Mass Index)                1.473 m (4' 9.99\") 25.86 kg/m2           Blood Pressure from Last 3 Encounters: "   03/30/18 127/85   03/23/18 125/90   03/23/18 (!) 89/65    Weight from Last 3 Encounters:   03/30/18 55.3 kg (121 lb 14.4 oz)   03/29/18 56.1 kg (123 lb 10.9 oz)   03/23/18 56.1 kg (123 lb 11.2 oz)              Today, you had the following     No orders found for display         Today's Medication Changes          These changes are accurate as of 3/29/18 11:59 PM.  If you have any questions, ask your nurse or doctor.               Start taking these medicines.        Dose/Directions    furosemide 20 MG tablet   Commonly known as:  LASIX   Used for:  Chronic systolic heart failure (H)   Started by:  Arcelia Souza PA-C        Dose:  20 mg   Take 1 tablet (20 mg) by mouth daily   Quantity:  90 tablet   Refills:  3            Where to get your medicines      These medications were sent to [address]  [address] [place] [address] [address] [place] [address]    Hours:  TRANSPLANT PHONE NUMBER [phone] Phone:  [phone]     furosemide 20 MG tablet                Primary Care Provider Office Phone # Fax #    Comfort Sabillon [phone] [address] [facility] [address]        Equal Access to Services     CATINA HEAD AH: Hadii laurita rodgerso Sogilbert, waaxda luqadaha, qaybta kaalmada adeegyada, durga price haynegin hermosillo . So [facility] [phone].    ATENCIÓN: Si habla español, tiene a sarmiento disposición servicios gratuitos de asistencia lingüística. Llame al [phone].    We comply with applicable federal civil rights laws and [place] laws. We do not discriminate on the basis of race, color, national origin, age, disability, sex, sexual orientation, or gender identity.            Thank you!     Thank you for choosing [facility]  for your care. Our goal is always to provide you with excellent care. Hearing back from our patients is one way we can continue to improve our services. Please  take a few minutes to complete the written survey that you may receive in the mail after your visit with us. Thank you!             Your Updated Medication List - Protect others around you: Learn how to safely use, store and throw away your medicines at www.disposemymeds.org.          This list is accurate as of 3/29/18 11:59 PM.  Always use your most recent med list.                   Brand Name Dispense Instructions for use Diagnosis    acetaminophen 325 MG tablet    TYLENOL     Take 2 tablets (650 mg) by mouth every 4 hours as needed for mild pain, fever or headaches    Diffuse large B-cell lymphoma of extranodal site (H)       apixaban ANTICOAGULANT 5 MG tablet    ELIQUIS    180 tablet    Take 1 tablet (5 mg) by mouth 2 times daily    Paroxysmal atrial fibrillation (H)       ascorbic acid 500 MG Tabs     30 tablet    Take 1 tablet (500 mg) by mouth daily    Diffuse large B-cell lymphoma, unspecified body region (H)       atenolol 25 MG tablet    TENORMIN    60 tablet    Take 1 tablet (25 mg) by mouth 2 times daily    Atrial tachycardia (H)       calcium polycarbophil 625 MG tablet    FIBERCON     Take 1 tablet by mouth 2 times daily        calcium-vitamin D 600-400 MG-UNIT per tablet    CALTRATE    60 tablet    Take 2 tablets by mouth every morning    Diffuse large B-cell lymphoma, unspecified body region (H)       colchicine 0.6 MG tablet     30 tablet    Take 1 tablet (0.6 mg) by mouth daily    Acute pericarditis, unspecified type       furosemide 20 MG tablet    LASIX    90 tablet    Take 1 tablet (20 mg) by mouth daily    Chronic systolic heart failure (H)       gabapentin 100 MG capsule    NEURONTIN    180 capsule    Take 2 capsules (200 mg) by mouth 3 times daily    Critical illness myopathy       HYDROCORTISONE PO      Take 100 mg by mouth IV        multivitamin, therapeutic with minerals Tabs tablet     30 each    Take 1 tablet by mouth daily    Diffuse large B-cell lymphoma, unspecified body region (H)        order for DME     1 Device    Equipment being ordered: BP cuff    Hypertension, unspecified type       spironolactone 25 MG tablet    ALDACTONE    30 tablet    Take 1 tablet (25 mg) by mouth daily    Acute on chronic diastolic heart failure (H)       tolterodine 4 MG 24 hr capsule    DETROL LA     Take 4 mg by mouth daily

## 2018-03-29 NOTE — TELEPHONE ENCOUNTER
Discussed with CRISTA Matute then called Amelia back with recommendations. Amelia verbalizes understanding and agrees with plan of care. Aga Reyes RN      Date: 3/29/2018  Time of Call: 1:17 PM  Diagnosis:  Heart failure  VORB - Ordering provider: CRISTA Matute  Order: Start lasix 20mg daily.  BMP checked next week. Will be drawn by Home Care.    Order received by: Aga Reyes RN   Follow-up/additional notes: Patient has f/u appt with Dr. Eaton on 4/11.  If [facility] needed also, we will schedule after viewing labs next week.

## 2018-03-29 NOTE — PROGRESS NOTES
"Patient returns for follow-up of her chronic nonhealing left medial elbow wound.    INTERVAL HISTORY: Patient was recently admitted for atrial fibrillation requiring cardioversion. Otherwise she reports she is doing her dressing changes regularly. Denies any fever or chills. However, patient now has tenderness at the site of firmness along her medial elbow. Denies any numbness or tingling in the ring and small fingers, but does report dulled sensation along the ulnar aspect of the forearm.    PHYSICAL EXAMINATION:  Ht 4' 9.99\"  Wt 123 lb 10.9 oz  BMI 25.86 kg/m2  General: Appears to be in no acute distress.  On exam of the left elbow, exam is essentially unchanged other than that her wound opening has become smaller. The wound tracks 1 cm deep with fibrinous exudate at the base. There is no drainage or purulence on expression. Proximal to this wound is a golf ball size firm area that is tender to palpation. There is skin laxity surrounding these areas of concern such that primary closure would be obtainable following excision. Radial artery pulse is palpable at the wrist. There is no Wartenberg's sign. Adductor strength is intact. Hand intrinsic strength is intact.    2018 Wound biopsy:  SPECIMEN(S):   Skin, left medial arm     FINAL DIAGNOSIS:   Skin, left medial arm   -  Fragments of mildly atypical squamous epithelium present at the deep   specimen margin, with overlying dermal   changes of scar and granulomatous inflammation  (see comment)     IMAGIN2018 MRI of L elbow:  IMPRESSION:   1. Postsurgical changes and scarring in the anteromedial distal arm  and proximal forearm. There is a skin defect measuring approximately 6  x 6 x 10 mm in the subcutaneous fat of the ventral medial distal arm.  No evidence of associated fluid tracks or subcutaneous fluid  collection. No evidence of osteomyelitis.  2. Large area of lobular hypoenhancing fat, just proximal to the  aforementioned skin defect in the " ventral medial distal arm,  compatible with fat necrosis.  3. High-grade tearing of the common extensor tendon.    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 Now symptomatic with pain.    PLAN: Patient is requesting excision of her chronic wound given that it is symptomatic with pain and requires daily dressing changes. Although wound biopsy showed mildly atypical squamous epithelium, there is no signs of malignancy that would make this excision urgent. I explained to her that she will need clearance for the operating room by her medical doctor since she was recently admitted for atrial fibrillation requiring cardioversion. Upon review of the MRI, there is evidence of a mass appearing to be fat necrosis lying adjacent to the ulnar nerve. The ulnar nerve will have to be protected during excision by tracking it from distal to proximal, from known to unknown. There is a possibility ulnar nerve submuscular transposition will have to be performed if there are exam findings of ulnar nerve instability following excision. I anticipate that primary closure will be obtainable given her skin laxity on exam. We went over the risks involved with such an operation to include bleeding, fluid collection, infection, injury to surrounding structures including major peripheral nerves, wound healing difficulties, chronic pain, chronic stiffness, and need for further surgeries. Patient understood all this and wishes to proceed if possible. We will wait to hear from the patient's medical doctor.    Total time spent with patient was 15 min of which greater than 50% was in counseling.

## 2018-03-29 NOTE — NURSING NOTE
"Reason For Visit:   Chief Complaint   Patient presents with     Consult     Follow up left elbow.  Last seen by Dr. Sheldon in plastics [facility] 2/13/18       Primary MD: Comfort Sabillon  Ref. MD: self    Age: 57 year old    ?  No      Ht 1.473 m (4' 9.99\")  Wt 56.1 kg (123 lb 10.9 oz)  BMI 25.86 kg/m2      Pain Assessment  Patient Currently in Pain: Yes  0-10 Pain Scale: 3  Primary Pain Location: Arm  Pain Orientation: Left  Pain Descriptors: Numbness, Tender               QuickDASH Assessment  No flowsheet data found.       Current Outpatient Prescriptions   Medication Sig Dispense Refill     furosemide (LASIX) 20 MG tablet Take 1 tablet (20 mg) by mouth daily 90 tablet 3     hydroxychloroquine (PLAQUENIL) 200 MG tablet Take 1 tablet (200 mg) by mouth 2 times daily 60 tablet 5     colchicine 0.6 MG tablet Take 1 tablet (0.6 mg) by mouth daily 30 tablet 0     spironolactone (ALDACTONE) 25 MG tablet Take 1 tablet (25 mg) by mouth daily 30 tablet 0     tolterodine (DETROL LA) 4 MG 24 hr capsule Take 4 mg by mouth daily        atenolol (TENORMIN) 25 MG tablet Take 1 tablet (25 mg) by mouth 2 times daily 60 tablet 3     apixaban ANTICOAGULANT (ELIQUIS) 5 MG tablet Take 1 tablet (5 mg) by mouth 2 times daily 180 tablet 3     order for DME Equipment being ordered: BP cuff 1 Device 1     predniSONE (DELTASONE) 5 MG tablet Take 1 tablet (5 mg) by mouth daily 90 tablet 2     gabapentin (NEURONTIN) 100 MG capsule Take 2 capsules (200 mg) by mouth 3 times daily 180 capsule 3     HYDROCORTISONE PO Take 100 mg by mouth IV       acetaminophen (TYLENOL) 325 MG tablet Take 2 tablets (650 mg) by mouth every 4 hours as needed for mild pain, fever or headaches       calcium polycarbophil (FIBERCON) 625 MG tablet Take 1 tablet by mouth 2 times daily       calcium-vitamin D (CALTRATE) 600-400 MG-UNIT per tablet Take 2 tablets by mouth every morning 60 tablet 0     multivitamin, therapeutic with minerals (THERA-VIT-M) TABS tablet " Take 1 tablet by mouth daily 30 each 0     ascorbic acid 500 MG TABS Take 1 tablet (500 mg) by mouth daily 30 tablet 0       Allergies   Allergen Reactions     Blood Transfusion Related (Informational Only) Hives     Hives with platelets. Give benadryl premedication.     Metoprolol Other (See Comments)     Pt and  report that metoprolol does not work for her and also reports feeling unwell with this medication. She has been able to tolerate atenolol, which as worked in controlling her HR.      No Clinical Screening - See Comments      Penicillin Allergy Skin Test not performed, see antimicrobial management team progress note 7/5/17.       Penicillins      Tape [Adhesive Tape] Rash       Juanita Ch, ATC

## 2018-03-29 NOTE — LETTER
"3/29/2018       RE: Amelia Michel  [address]  [facility] [address]     Dear Colleague,    Thank you for referring your patient, Amelia Michel, to the [facility] at [facility]. Please see a copy of my visit note below.    Patient returns for follow-up of her chronic nonhealing left medial elbow wound.    INTERVAL HISTORY: Patient was recently admitted for atrial fibrillation requiring cardioversion. Otherwise she reports she is doing her dressing changes regularly. Denies any fever or chills. However, patient now has tenderness at the site of firmness along her medial elbow. Denies any numbness or tingling in the ring and small fingers, but does report dulled sensation along the ulnar aspect of the forearm.    PHYSICAL EXAMINATION:  Ht 4' 9.99\"  Wt 123 lb 10.9 oz  BMI 25.86 kg/m2  General: Appears to be in no acute distress.  On exam of the left elbow, exam is essentially unchanged other than that her wound opening has become smaller. The wound tracks 1 cm deep with fibrinous exudate at the base. There is no drainage or purulence on expression. Proximal to this wound is a golf ball size firm area that is tender to palpation. There is skin laxity surrounding these areas of concern such that primary closure would be obtainable following excision. Radial artery pulse is palpable at the wrist. There is no Wartenberg's sign. Adductor strength is intact. Hand intrinsic strength is intact.    2018 Wound biopsy:  SPECIMEN(S):   Skin, left medial arm     FINAL DIAGNOSIS:   Skin, left medial arm   -  Fragments of mildly atypical squamous epithelium present at the deep   specimen margin, with overlying dermal   changes of scar and granulomatous inflammation  (see comment)     IMAGIN2018 MRI of L elbow:  IMPRESSION:   1. Postsurgical changes and scarring in the anteromedial distal arm  and proximal forearm. There is a skin defect measuring " approximately 6  x 6 x 10 mm in the subcutaneous fat of the ventral medial distal arm.  No evidence of associated fluid tracks or subcutaneous fluid  collection. No evidence of osteomyelitis.  2. Large area of lobular hypoenhancing fat, just proximal to the  aforementioned skin defect in the ventral medial distal arm,  compatible with fat necrosis.  3. High-grade tearing of the common extensor tendon.    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 Now symptomatic with pain.    PLAN: Patient is requesting excision of her chronic wound given that it is symptomatic with pain and requires daily dressing changes. Although wound biopsy showed mildly atypical squamous epithelium, there is no signs of malignancy that would make this excision urgent. I explained to her that she will need clearance for the operating room by her medical doctor since she was recently admitted for atrial fibrillation requiring cardioversion. Upon review of the MRI, there is evidence of a mass appearing to be fat necrosis lying adjacent to the ulnar nerve. The ulnar nerve will have to be protected during excision by tracking it from distal to proximal, from known to unknown. There is a possibility ulnar nerve submuscular transposition will have to be performed if there are exam findings of ulnar nerve instability following excision. I anticipate that primary closure will be obtainable given her skin laxity on exam. We went over the risks involved with such an operation to include bleeding, fluid collection, infection, injury to surrounding structures including major peripheral nerves, wound healing difficulties, chronic pain, chronic stiffness, and need for further surgeries. Patient understood all this and wishes to proceed if possible. We will wait to hear from the patient's medical doctor.    Total time spent with patient was 15 min of which greater than 50% was in counseling.    Again,  thank you for allowing me to participate in the care of your patient.      Sincerely,    Kevin Sheldon MD

## 2018-03-29 NOTE — TELEPHONE ENCOUNTER
Received a call from Amelia.  She states that she has been trending her weight at home.  On Saturday it was 120.8, Sunday 121.3, Today 122.1.  She denies any shortness of breath. Oxygen saturations 97-98%.  She feels bloated and feels like her liver is distended.  She mentioned that it was discussed in her last visit about possibly starting Lasix.  Informed patient that I will send a message to the [facility] team for further review.  Amelia states that she understands information provided and will call with any further questions or concerns.    Lionel Molina, RN  RN Care Coordinator  [facility]  [phone]

## 2018-03-30 ENCOUNTER — OFFICE VISIT (OUTPATIENT)
Dept: RHEUMATOLOGY | Facility: CLINIC | Age: 58
End: 2018-03-30
Attending: STUDENT IN AN ORGANIZED HEALTH CARE EDUCATION/TRAINING PROGRAM
Payer: COMMERCIAL

## 2018-03-30 ENCOUNTER — TELEPHONE (OUTPATIENT)
Dept: CARDIOLOGY | Facility: CLINIC | Age: 58
End: 2018-03-30

## 2018-03-30 VITALS
BODY MASS INDEX: 25.48 KG/M2 | OXYGEN SATURATION: 98 % | DIASTOLIC BLOOD PRESSURE: 85 MMHG | SYSTOLIC BLOOD PRESSURE: 127 MMHG | WEIGHT: 121.9 LBS | HEART RATE: 104 BPM

## 2018-03-30 DIAGNOSIS — M05.741 RHEUMATOID ARTHRITIS INVOLVING BOTH HANDS WITH POSITIVE RHEUMATOID FACTOR (H): ICD-10-CM

## 2018-03-30 DIAGNOSIS — M05.742 RHEUMATOID ARTHRITIS INVOLVING BOTH HANDS WITH POSITIVE RHEUMATOID FACTOR (H): ICD-10-CM

## 2018-03-30 PROCEDURE — G0463 HOSPITAL OUTPT CLINIC VISIT: HCPCS | Mod: ZF

## 2018-03-30 RX ORDER — HYDROXYCHLOROQUINE SULFATE 200 MG/1
200 TABLET, FILM COATED ORAL 2 TIMES DAILY
Qty: 60 TABLET | Refills: 5 | Status: SHIPPED | OUTPATIENT
Start: 2018-03-30 | End: 2018-10-09

## 2018-03-30 RX ORDER — PREDNISONE 5 MG/1
5 TABLET ORAL DAILY
Qty: 90 TABLET | Refills: 2 | Status: SHIPPED | OUTPATIENT
Start: 2018-03-30 | End: 2019-06-20

## 2018-03-30 ASSESSMENT — PAIN SCALES - GENERAL: PAINLEVEL: NO PAIN (0)

## 2018-03-30 NOTE — NURSING NOTE
"Chief Complaint   Patient presents with     RECHECK     Joint pain       Initial /85  Pulse 104  Wt 55.3 kg (121 lb 14.4 oz)  SpO2 98%  BMI 25.48 kg/m2 Estimated body mass index is 25.48 kg/(m^2) as calculated from the following:    Height as of 3/29/18: 1.473 m (4' 9.99\").    Weight as of this encounter: 55.3 kg (121 lb 14.4 oz).  Medication Reconciliation: complete   AYLA MOSCOSO CMA      "

## 2018-03-30 NOTE — LETTER
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 Given this it seems very unlikely her pericardial effusion is due to RA and I worry about recurrent lymphoma, infection, etc. She has cardiology and heme/onc follow up scheduled. Will continue current management of RA with conservative therapies given no disease activity.    -prednisone 5 mg daily and  mg bid; will need eye exam  -follow up in 3 months  -should get DEXA scan at some point to evaluate bone density and consider whether bisphosphonate indicated (had 7-8 years of Fosamax in early 2000's)    Seen and discussed with Dr. Pritchett.    Pj Cunha MD  Rheumatology Fellow  [phone]        Attending Attestation:    I was present during key aspects of history and physical examination, was directly involved in medical decision making and management of this patient, and I agree with the documentation, findings, and plan.     I discussed the findings and recommendations with the  patient.  Recommendations were discussed with the consulting team.  Time spent: 30 minutes    Nayeli Pritchett MD, MS  Rheumatology Attending Physician  pgr [phone]            Active Problem List:     Patient Active Problem List    Diagnosis Date Noted     Palpitations 03/07/2018     Priority: Medium     H/O cardiac arrest 09/26/2017     Priority: Medium     DLBCL (diffuse large B cell lymphoma) (H) 09/07/2017     Priority: Medium     Physical deconditioning 08/12/2017     Priority: Medium     Febrile neutropenia (H) 08/08/2017     Priority: Medium     Diffuse large B cell lymphoma (H) 08/04/2017     Priority: Medium     Diffuse large B-cell lymphoma of extranodal site (H) 07/27/2017     Priority: Medium     Sepsis (H) 06/19/2017     Priority: Medium     Wound of left upper extremity 06/19/2017     Priority: Medium     Critical illness myopathy 06/16/2017     Priority: Medium     Acute respiratory failure with hypoxia (H) 06/09/2017     Priority: Medium     Hypertension      Priority: Medium     Cardiogenic shock (H) 05/29/2017     Priority: Medium     Atrial tachycardia (H) 05/16/2017     Priority: Medium     Lymphedema of both lower extremities 04/04/2017     Priority: Medium     RA (rheumatoid arthritis) (H) 05/02/2016     Priority: Medium     Hyperlipidemia LDL goal <130 02/22/2011     Priority: Medium     HTN (hypertension)      Priority: Medium     Primary pulmonary hypertension (H)      Priority: Medium     Seeing [facility].        Health Care Home 08/21/2017     Priority: Low     *See Letters for [facility] Care Plan: My Access Plan              History of Present Illness:   Amelia Michel is a 56 year old female who presents for evaluation of longstanding rheumatoid arthritis. We initially met when she was hospitalized in July after a cardiac arrest that at the time was of unclear etiology. She was admitted in late May after an out of hospital arrest with ROSC in the field. She was initially in atrial  fibrillation and given multiple IV medications including some amiodarone and developed what sounds like a wide complex tachycardia and was shocked out of it. During her hospitalization she had severe cytopenias requiring transfusions as well as fevers of unknown origin. She ultimately had a bone marrow and liver bioies which showed diffuse large B cell lymphoma. A PET scan showed extensive involvement including a possible cardiac lesion. She received a cycle of R-EPOCH 7/27/17 that was complicated by severe cytopenias requiring transfusions and a rehab stay. Her LP was negative for CNS involvement 8/23/17 and was transitioned to her first cycle of R-CHOP 8/22/17 with high-dose methotrexate on day 15 for CNS prophylaxis. She has developed a severe neuropathy in the digits of her upper and lower extremities that has been attributed to the methotrexate.     Her rheumatoid arthritis had been managed by Dr. Dumas for many years. She had relatively inactive disease prior to her arrest but was on methotrexate 10 mg PO per week, Arava 10 mg daily, Plaquenil 200 mg twice per day, and prednisone 3 mg daily. She has had partial fusion of her right wrist. After discharge from the hospital she was put on prednisone 5 mg daily as monotherapy. Her disease was completely inactive in the setting of her chemotherapy. Prednisone and Plaquenil discontinued on account of chemotherapy but restarted for arthralgia and possible low-grade synovitis thereafter.    Last seen: 1/12/18    Interim history: Mrs. Michel is doing fairly well from the RA standpoint. She doesn't have much stiffness in the morning and nothing is swollen. She has been struggling with atrial fibrillation and with suboptimal rate control after several cardioversions. She is not interested in having more of these. As part of her evaluation she had a cardiac MRI that showed a loculated pericardial effusion. She is on colchicine for this and has a follow up MRI in 1  month. She sees Dr. Rodriguez in May and has another CT scan beforehand to evaluate her lymphoma.         Review of Systems (other than HPI):   Constitutional: negative  Skin: negative  Eyes: negative  Ears/Nose/Throat: negative  Respiratory: negative  Cardiovascular: negative  Gastrointestinal: negative  Genitourinary: negative  Musculoskeletal: negative  Neurologic: negative  Psychiatric: negative  Hematologic/Lymphatic/Immunologic: negative  Endocrine: negative          Past Medical History:     Past Medical History:   Diagnosis Date     Atrial fibrillation (H)      Cancer (H)     lymphoma     Cardiac arrest (H)      Hypertension      Neuropathy      Rheumatoid arthritis(714.0)      Past Surgical History:   Procedure Laterality Date     ANESTHESIA CARDIOVERSION N/A 5/17/2017    Procedure: ANESTHESIA CARDIOVERSION;  ANESTHESIA CARDIOVERSION;  Surgeon: GENERIC ANESTHESIA PROVIDER;  Location: [facility] OR     ANESTHESIA CARDIOVERSION  3/12/2018    Procedure: ANESTHESIA CARDIOVERSION;;  Surgeon: GENERIC ANESTHESIA PROVIDER;  Location: [facility] OR     ANESTHESIA CARDIOVERSION N/A 3/23/2018    Procedure: ANESTHESIA CARDIOVERSION;  Anesthesia Cardioversion ;  Surgeon: GENERIC ANESTHESIA PROVIDER;  Location: [facility] OR     ARTHRODESIS WRIST  2000    Right wrist     BONE MARROW ASPIRATE &BIOPSY  7/12/2017          FOOT SURGERY      4 left and 2 right     RELEASE CARPAL TUNNEL BILATERAL       REPAIR VENTRICULAR SEPTAL DEFECT  1969          Social History:     Social History     Occupational History      [employer]     Social History Main Topics     Smoking status: Never Smoker     Smokeless tobacco: Never Used     Alcohol use Yes      Comment: Glass of wine two evenings a night     Drug use: No     Sexual activity: Not on file          Family History:     Family History   Problem Relation Age of Onset     Breast Cancer Mother      Hypertension Mother      Alzheimer Disease Mother      Hypertension Father      Blood  Disease Father      Lymphoa     Circulatory Father      A Fib     DIABETES Paternal Grandmother    Has a paternal relative with RA.         Allergies:     Allergies   Allergen Reactions     Blood Transfusion Related (Informational Only) Hives     Hives with platelets. Give benadryl premedication.     Metoprolol Other (See Comments)     Pt and  report that metoprolol does not work for her and also reports feeling unwell with this medication. She has been able to tolerate atenolol, which as worked in controlling her HR.      No Clinical Screening - See Comments      Penicillin Allergy Skin Test not performed, see antimicrobial management team progress note 7/5/17.       Penicillins      Tape [Adhesive Tape] Rash          Medications:     Current Outpatient Prescriptions   Medication Sig Dispense Refill     hydroxychloroquine (PLAQUENIL) 200 MG tablet Take 1 tablet (200 mg) by mouth 2 times daily 60 tablet 5     predniSONE (DELTASONE) 5 MG tablet Take 1 tablet (5 mg) by mouth daily 90 tablet 2     furosemide (LASIX) 20 MG tablet Take 1 tablet (20 mg) by mouth daily 90 tablet 3     colchicine 0.6 MG tablet Take 1 tablet (0.6 mg) by mouth daily 30 tablet 0     spironolactone (ALDACTONE) 25 MG tablet Take 1 tablet (25 mg) by mouth daily 30 tablet 0     tolterodine (DETROL LA) 4 MG 24 hr capsule Take 4 mg by mouth daily        atenolol (TENORMIN) 25 MG tablet Take 1 tablet (25 mg) by mouth 2 times daily 60 tablet 3     apixaban ANTICOAGULANT (ELIQUIS) 5 MG tablet Take 1 tablet (5 mg) by mouth 2 times daily 180 tablet 3     order for DME Equipment being ordered: BP cuff 1 Device 1     gabapentin (NEURONTIN) 100 MG capsule Take 2 capsules (200 mg) by mouth 3 times daily 180 capsule 3     HYDROCORTISONE PO Take 100 mg by mouth IV       acetaminophen (TYLENOL) 325 MG tablet Take 2 tablets (650 mg) by mouth every 4 hours as needed for mild pain, fever or headaches       calcium polycarbophil (FIBERCON) 625 MG tablet Take  1 tablet by mouth 2 times daily       calcium-vitamin D (CALTRATE) 600-400 MG-UNIT per tablet Take 2 tablets by mouth every morning 60 tablet 0     multivitamin, therapeutic with minerals (THERA-VIT-M) TABS tablet Take 1 tablet by mouth daily 30 each 0     ascorbic acid 500 MG TABS Take 1 tablet (500 mg) by mouth daily 30 tablet 0          Physical Exam:   Blood pressure 127/85, pulse 104, weight 55.3 kg (121 lb 14.4 oz), SpO2 98 %, not currently breastfeeding.  Wt Readings from Last 4 Encounters:   03/30/18 55.3 kg (121 lb 14.4 oz)   03/29/18 56.1 kg (123 lb 10.9 oz)   03/23/18 56.1 kg (123 lb 11.2 oz)   03/13/18 53.5 kg (118 lb)     Constitutional: well-developed, appearing stated age; cooperative  Eyes: normal EOM, PERRLA, vision, conjunctiva, sclera  ENT: normal external ears, nose, hearing, lips, teeth, throat; no mucous membrane lesions, normal saliva pool  Neck: no mass or thyroid enlargement  Resp: lungs clear to auscultation  CV: RRR, brisk systolic murmur in aortic position, no rubs or gallops, no edema  GI: no abdominal mass or tenderness, no organomegaly  : not tested  Lymph: no cervical, supraclavicular, or epitrochlear nodes  MS: The TMJ, neck, shoulder, elbow, wrist, MCP/PIP/DIP, spine, hip, knee, ankle, and foot MTP/IP joints were examined. Synovitis in wrists, MCP's, PIP's resolved. No dactylitis,  tenosynovitis, enthesopathy. Has subluxation of MCP's 2-4 bilaterally. Diminished flexion greater than extension left wrist. Right wrist partially fused.   Skin: no nail pitting, rash, nodules or lesions; diffuse alopecia; bandaged wound across medial aspect of left elbow  Neuro: normal cranial nerves, ambulatory with and without walker, sensation diminished subjectively in lower extremities  Psych: normal judgement, orientation, memory, affect         Data:     Results for orders placed or performed during the hospital encounter of 03/23/18   CBC with platelets   Result Value Ref Range    WBC 3.4 (L)  4.0 - 11.0 10e9/L    RBC Count 4.03 3.8 - 5.2 10e12/L    Hemoglobin 15.2 11.7 - 15.7 g/dL    Hematocrit 46.4 35.0 - 47.0 %     (H) 78 - 100 fl    MCH 37.7 (H) 26.5 - 33.0 pg    MCHC 32.8 31.5 - 36.5 g/dL    RDW 13.9 10.0 - 15.0 %    Platelet Count 87 (L) 150 - 450 10e9/L   Comprehensive metabolic panel   Result Value Ref Range    Sodium 139 133 - 144 mmol/L    Potassium 4.0 3.4 - 5.3 mmol/L    Chloride 108 94 - 109 mmol/L    Carbon Dioxide 22 20 - 32 mmol/L    Anion Gap 9 3 - 14 mmol/L    Glucose 92 70 - 99 mg/dL    Urea Nitrogen 14 7 - 30 mg/dL    Creatinine 0.76 0.52 - 1.04 mg/dL    GFR Estimate 78 >60 mL/min/1.7m2    GFR Estimate If Black >90 >60 mL/min/1.7m2    Calcium 9.2 8.5 - 10.1 mg/dL    Bilirubin Total 0.7 0.2 - 1.3 mg/dL    Albumin 3.7 3.4 - 5.0 g/dL    Protein Total 6.2 (L) 6.8 - 8.8 g/dL    Alkaline Phosphatase 136 40 - 150 U/L    ALT 57 (H) 0 - 50 U/L    AST 28 0 - 45 U/L     Hemoglobin   Date Value Ref Range Status   03/23/2018 15.2 11.7 - 15.7 g/dL Final   03/13/2018 14.1 11.7 - 15.7 g/dL Final   03/12/2018 13.5 11.7 - 15.7 g/dL Final     Urea Nitrogen   Date Value Ref Range Status   03/23/2018 14 7 - 30 mg/dL Final   03/13/2018 27 7 - 30 mg/dL Final   03/12/2018 16 7 - 30 mg/dL Final     Sed Rate   Date Value Ref Range Status   03/08/2018 6 0 - 30 mm/h Final   01/12/2018 12 0 - 30 mm/h Final   06/19/2017 63 (H) 0 - 30 mm/h Final     CRP Inflammation   Date Value Ref Range Status   03/08/2018 63.0 (H) 0.0 - 8.0 mg/L Final   01/12/2018 6.7 0.0 - 8.0 mg/L Final   07/14/2017 83.0 (H) 0.0 - 8.0 mg/L Final     AST   Date Value Ref Range Status   03/23/2018 28 0 - 45 U/L Final   03/13/2018 65 (H) 0 - 45 U/L Final   03/12/2018 30 0 - 45 U/L Final     Albumin   Date Value Ref Range Status   03/23/2018 3.7 3.4 - 5.0 g/dL Final   03/13/2018 3.4 3.4 - 5.0 g/dL Final   03/12/2018 3.2 (L) 3.4 - 5.0 g/dL Final     Alkaline Phosphatase   Date Value Ref Range Status   03/23/2018 136 40 - 150 U/L Final    03/13/2018 132 40 - 150 U/L Final   03/12/2018 124 40 - 150 U/L Final     ALT   Date Value Ref Range Status   03/23/2018 57 (H) 0 - 50 U/L Final   03/13/2018 104 (H) 0 - 50 U/L Final   03/12/2018 89 (H) 0 - 50 U/L Final     Rheumatoid Factor   Date Value Ref Range Status   06/10/2017 <20 <20 IU/mL Final   04/27/2016 31 (H) <20 IU/mL Final     Recent Labs   Lab Test  03/23/18   0945  03/13/18   0716  03/12/18   0709   05/15/17   1752   WBC  3.4*  4.1  3.4*   < >  4.2   HGB  15.2  14.1  13.5   < >  10.9*   HCT  46.4  42.9  41.7   < >  32.7*   MCV  115*  115*  116*   < >  108*   PLT  87*  69*  71*   < >  100*   BUN  14  27  16   < >  19   TSH   --    --    --    --   1.03   AST  28  65*  30   < >   --    ALT  57*  104*  89*   < >   --    ALKPHOS  136  132  124   < >   --     < > = values in this interval not displayed.     Results for FIDELIA MIDDLETON (MRN [id number]) as of 9/15/2017 11:21   Ref. Range 6/10/2017 08:03   Cyclic Citrullinated Peptide Antibody, IgG Latest Ref Range: <7 U/mL 331 (H)     Reviewed Rheumatology lab flowsheet    Pj Cunha MD, MD

## 2018-03-30 NOTE — PROGRESS NOTES
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 Given this it seems very unlikely her pericardial effusion is due to RA and I worry about recurrent lymphoma, infection, etc. She has cardiology and heme/onc follow up scheduled. Will continue current management of RA with conservative therapies given no disease activity.    -prednisone 5 mg daily and  mg bid; will need eye exam  -follow up in 3 months  -should get DEXA scan at some point to evaluate bone density and consider whether bisphosphonate indicated (had 7-8 years of Fosamax in early 2000's)    Seen and discussed with Dr. Pritchett.    Pj Cunha MD  Rheumatology Fellow  [phone]        Attending Attestation:    I was present during key aspects of history and physical examination, was directly involved in medical decision making and management of this patient, and I agree with the documentation, findings, and plan.     I discussed the findings and recommendations with the patient.  Recommendations were discussed with the consulting team.  Time spent: 30  minutes    Nayeli Pritchett MD, MS  Rheumatology Attending Physician  [facility] [[email or web address]] [phone]            Active Problem List:     Patient Active Problem List    Diagnosis Date Noted     Palpitations 03/07/2018     Priority: Medium     H/O cardiac arrest 09/26/2017     Priority: Medium     DLBCL (diffuse large B cell lymphoma) (H) 09/07/2017     Priority: Medium     Physical deconditioning 08/12/2017     Priority: Medium     Febrile neutropenia (H) 08/08/2017     Priority: Medium     Diffuse large B cell lymphoma (H) 08/04/2017     Priority: Medium     Diffuse large B-cell lymphoma of extranodal site (H) 07/27/2017     Priority: Medium     Sepsis (H) 06/19/2017     Priority: Medium     Wound of left upper extremity 06/19/2017     Priority: Medium     Critical illness myopathy 06/16/2017     Priority: Medium     Acute respiratory failure with hypoxia (H) 06/09/2017     Priority: Medium     Hypertension      Priority: Medium     Cardiogenic shock (H) 05/29/2017     Priority: Medium     Atrial tachycardia (H) 05/16/2017     Priority: Medium     Lymphedema of both lower extremities 04/04/2017     Priority: Medium     RA (rheumatoid arthritis) (H) 05/02/2016     Priority: Medium     Hyperlipidemia LDL goal <130 02/22/2011     Priority: Medium     HTN (hypertension)      Priority: Medium     Primary pulmonary hypertension (H)      Priority: Medium     Seeing [facility].        Health Care Home 08/21/2017     Priority: Low     *See Letters for [facility] Care Plan: My Access Plan              History of Present Illness:   Amelia Michel is a 56 year old female who presents for evaluation of longstanding rheumatoid arthritis. We initially met when she was hospitalized in July after a cardiac arrest that at the time was of unclear etiology. She was admitted in late May after an out of hospital arrest with ROSC in the field. She was initially in atrial fibrillation and given multiple IV medications including some amiodarone and developed what  sounds like a wide complex tachycardia and was shocked out of it. During her hospitalization she had severe cytopenias requiring transfusions as well as fevers of unknown origin. She ultimately had a bone marrow and liver bioies which showed diffuse large B cell lymphoma. A PET scan showed extensive involvement including a possible cardiac lesion. She received a cycle of R-EPOCH 7/27/17 that was complicated by severe cytopenias requiring transfusions and a rehab stay. Her LP was negative for CNS involvement 8/23/17 and was transitioned to her first cycle of R-CHOP 8/22/17 with high-dose methotrexate on day 15 for CNS prophylaxis. She has developed a severe neuropathy in the digits of her upper and lower extremities that has been attributed to the methotrexate.     Her rheumatoid arthritis had been managed by Dr. Dumas for many years. She had relatively inactive disease prior to her arrest but was on methotrexate 10 mg PO per week, Arava 10 mg daily, Plaquenil 200 mg twice per day, and prednisone 3 mg daily. She has had partial fusion of her right wrist. After discharge from the hospital she was put on prednisone 5 mg daily as monotherapy. Her disease was completely inactive in the setting of her chemotherapy. Prednisone and Plaquenil discontinued on account of chemotherapy but restarted for arthralgia and possible low-grade synovitis thereafter.    Last seen: 1/12/18    Interim history: Mrs. Michel is doing fairly well from the RA standpoint. She doesn't have much stiffness in the morning and nothing is swollen. She has been struggling with atrial fibrillation and with suboptimal rate control after several cardioversions. She is not interested in having more of these. As part of her evaluation she had a cardiac MRI that showed a loculated pericardial effusion. She is on colchicine for this and has a follow up MRI in 1 month. She sees Dr. Rodriguez in May and has another CT scan beforehand to evaluate her  lymphoma.         Review of Systems (other than HPI):   Constitutional: negative  Skin: negative  Eyes: negative  Ears/Nose/Throat: negative  Respiratory: negative  Cardiovascular: negative  Gastrointestinal: negative  Genitourinary: negative  Musculoskeletal: negative  Neurologic: negative  Psychiatric: negative  Hematologic/Lymphatic/Immunologic: negative  Endocrine: negative          Past Medical History:     Past Medical History:   Diagnosis Date     Atrial fibrillation (H)      Cancer (H)     lymphoma     Cardiac arrest (H)      Hypertension      Neuropathy      Rheumatoid arthritis(714.0)      Past Surgical History:   Procedure Laterality Date     ANESTHESIA CARDIOVERSION N/A 5/17/2017    Procedure: ANESTHESIA CARDIOVERSION;  ANESTHESIA CARDIOVERSION;  Surgeon: GENERIC ANESTHESIA PROVIDER;  Location: [facility] OR     ANESTHESIA CARDIOVERSION  3/12/2018    Procedure: ANESTHESIA CARDIOVERSION;;  Surgeon: GENERIC ANESTHESIA PROVIDER;  Location: [facility] OR     ANESTHESIA CARDIOVERSION N/A 3/23/2018    Procedure: ANESTHESIA CARDIOVERSION;  Anesthesia Cardioversion ;  Surgeon: GENERIC ANESTHESIA PROVIDER;  Location: [facility] OR     ARTHRODESIS WRIST  2000    Right wrist     BONE MARROW ASPIRATE &BIOPSY  7/12/2017          FOOT SURGERY      4 left and 2 right     RELEASE CARPAL TUNNEL BILATERAL       REPAIR VENTRICULAR SEPTAL DEFECT  1969          Social History:     Social History     Occupational History      [employer]     Social History Main Topics     Smoking status: Never Smoker     Smokeless tobacco: Never Used     Alcohol use Yes      Comment: Glass of wine two evenings a night     Drug use: No     Sexual activity: Not on file          Family History:     Family History   Problem Relation Age of Onset     Breast Cancer Mother      Hypertension Mother      Alzheimer Disease Mother      Hypertension Father      Blood Disease Father      Lymphoa     Circulatory Father      A Fib     DIABETES Paternal  Grandmother    Has a paternal relative with RA.         Allergies:     Allergies   Allergen Reactions     Blood Transfusion Related (Informational Only) Hives     Hives with platelets. Give benadryl premedication.     Metoprolol Other (See Comments)     Pt and  report that metoprolol does not work for her and also reports feeling unwell with this medication. She has been able to tolerate atenolol, which as worked in controlling her HR.      No Clinical Screening - See Comments      Penicillin Allergy Skin Test not performed, see antimicrobial management team progress note 7/5/17.       Penicillins      Tape [Adhesive Tape] Rash          Medications:     Current Outpatient Prescriptions   Medication Sig Dispense Refill     hydroxychloroquine (PLAQUENIL) 200 MG tablet Take 1 tablet (200 mg) by mouth 2 times daily 60 tablet 5     predniSONE (DELTASONE) 5 MG tablet Take 1 tablet (5 mg) by mouth daily 90 tablet 2     furosemide (LASIX) 20 MG tablet Take 1 tablet (20 mg) by mouth daily 90 tablet 3     colchicine 0.6 MG tablet Take 1 tablet (0.6 mg) by mouth daily 30 tablet 0     spironolactone (ALDACTONE) 25 MG tablet Take 1 tablet (25 mg) by mouth daily 30 tablet 0     tolterodine (DETROL LA) 4 MG 24 hr capsule Take 4 mg by mouth daily        atenolol (TENORMIN) 25 MG tablet Take 1 tablet (25 mg) by mouth 2 times daily 60 tablet 3     apixaban ANTICOAGULANT (ELIQUIS) 5 MG tablet Take 1 tablet (5 mg) by mouth 2 times daily 180 tablet 3     order for DME Equipment being ordered: BP cuff 1 Device 1     gabapentin (NEURONTIN) 100 MG capsule Take 2 capsules (200 mg) by mouth 3 times daily 180 capsule 3     HYDROCORTISONE PO Take 100 mg by mouth IV       acetaminophen (TYLENOL) 325 MG tablet Take 2 tablets (650 mg) by mouth every 4 hours as needed for mild pain, fever or headaches       calcium polycarbophil (FIBERCON) 625 MG tablet Take 1 tablet by mouth 2 times daily       calcium-vitamin D (CALTRATE) 600-400 MG-UNIT  per tablet Take 2 tablets by mouth every morning 60 tablet 0     multivitamin, therapeutic with minerals (THERA-VIT-M) TABS tablet Take 1 tablet by mouth daily 30 each 0     ascorbic acid 500 MG TABS Take 1 tablet (500 mg) by mouth daily 30 tablet 0          Physical Exam:   Blood pressure 127/85, pulse 104, weight 55.3 kg (121 lb 14.4 oz), SpO2 98 %, not currently breastfeeding.  Wt Readings from Last 4 Encounters:   03/30/18 55.3 kg (121 lb 14.4 oz)   03/29/18 56.1 kg (123 lb 10.9 oz)   03/23/18 56.1 kg (123 lb 11.2 oz)   03/13/18 53.5 kg (118 lb)     Constitutional: well-developed, appearing stated age; cooperative  Eyes: normal EOM, PERRLA, vision, conjunctiva, sclera  ENT: normal external ears, nose, hearing, lips, teeth, throat; no mucous membrane lesions, normal saliva pool  Neck: no mass or thyroid enlargement  Resp: lungs clear to auscultation  CV: RRR, brisk systolic murmur in aortic position, no rubs or gallops, no edema  GI: no abdominal mass or tenderness, no organomegaly  : not tested  Lymph: no cervical, supraclavicular, or epitrochlear nodes  MS: The TMJ, neck, shoulder, elbow, wrist, MCP/PIP/DIP, spine, hip, knee, ankle, and foot MTP/IP joints were examined. Synovitis in wrists, MCP's, PIP's resolved. No dactylitis,  tenosynovitis, enthesopathy. Has subluxation of MCP's 2-4 bilaterally. Diminished flexion greater than extension left wrist. Right wrist partially fused.   Skin: no nail pitting, rash, nodules or lesions; diffuse alopecia; bandaged wound across medial aspect of left elbow  Neuro: normal cranial nerves, ambulatory with and without walker, sensation diminished subjectively in lower extremities  Psych: normal judgement, orientation, memory, affect         Data:     Results for orders placed or performed during the hospital encounter of 03/23/18   CBC with platelets   Result Value Ref Range    WBC 3.4 (L) 4.0 - 11.0 10e9/L    RBC Count 4.03 3.8 - 5.2 10e12/L    Hemoglobin 15.2 11.7 - 15.7  g/dL    Hematocrit 46.4 35.0 - 47.0 %     (H) 78 - 100 fl    MCH 37.7 (H) 26.5 - 33.0 pg    MCHC 32.8 31.5 - 36.5 g/dL    RDW 13.9 10.0 - 15.0 %    Platelet Count 87 (L) 150 - 450 10e9/L   Comprehensive metabolic panel   Result Value Ref Range    Sodium 139 133 - 144 mmol/L    Potassium 4.0 3.4 - 5.3 mmol/L    Chloride 108 94 - 109 mmol/L    Carbon Dioxide 22 20 - 32 mmol/L    Anion Gap 9 3 - 14 mmol/L    Glucose 92 70 - 99 mg/dL    Urea Nitrogen 14 7 - 30 mg/dL    Creatinine 0.76 0.52 - 1.04 mg/dL    GFR Estimate 78 >60 mL/min/1.7m2    GFR Estimate If Black >90 >60 mL/min/1.7m2    Calcium 9.2 8.5 - 10.1 mg/dL    Bilirubin Total 0.7 0.2 - 1.3 mg/dL    Albumin 3.7 3.4 - 5.0 g/dL    Protein Total 6.2 (L) 6.8 - 8.8 g/dL    Alkaline Phosphatase 136 40 - 150 U/L    ALT 57 (H) 0 - 50 U/L    AST 28 0 - 45 U/L     Hemoglobin   Date Value Ref Range Status   03/23/2018 15.2 11.7 - 15.7 g/dL Final   03/13/2018 14.1 11.7 - 15.7 g/dL Final   03/12/2018 13.5 11.7 - 15.7 g/dL Final     Urea Nitrogen   Date Value Ref Range Status   03/23/2018 14 7 - 30 mg/dL Final   03/13/2018 27 7 - 30 mg/dL Final   03/12/2018 16 7 - 30 mg/dL Final     Sed Rate   Date Value Ref Range Status   03/08/2018 6 0 - 30 mm/h Final   01/12/2018 12 0 - 30 mm/h Final   06/19/2017 63 (H) 0 - 30 mm/h Final     CRP Inflammation   Date Value Ref Range Status   03/08/2018 63.0 (H) 0.0 - 8.0 mg/L Final   01/12/2018 6.7 0.0 - 8.0 mg/L Final   07/14/2017 83.0 (H) 0.0 - 8.0 mg/L Final     AST   Date Value Ref Range Status   03/23/2018 28 0 - 45 U/L Final   03/13/2018 65 (H) 0 - 45 U/L Final   03/12/2018 30 0 - 45 U/L Final     Albumin   Date Value Ref Range Status   03/23/2018 3.7 3.4 - 5.0 g/dL Final   03/13/2018 3.4 3.4 - 5.0 g/dL Final   03/12/2018 3.2 (L) 3.4 - 5.0 g/dL Final     Alkaline Phosphatase   Date Value Ref Range Status   03/23/2018 136 40 - 150 U/L Final   03/13/2018 132 40 - 150 U/L Final   03/12/2018 124 40 - 150 U/L Final     ALT   Date Value  Ref Range Status   03/23/2018 57 (H) 0 - 50 U/L Final   03/13/2018 104 (H) 0 - 50 U/L Final   03/12/2018 89 (H) 0 - 50 U/L Final     Rheumatoid Factor   Date Value Ref Range Status   06/10/2017 <20 <20 IU/mL Final   04/27/2016 31 (H) <20 IU/mL Final     Recent Labs   Lab Test  03/23/18   0945  03/13/18   0716  03/12/18   0709   05/15/17   1752   WBC  3.4*  4.1  3.4*   < >  4.2   HGB  15.2  14.1  13.5   < >  10.9*   HCT  46.4  42.9  41.7   < >  32.7*   MCV  115*  115*  116*   < >  108*   PLT  87*  69*  71*   < >  100*   BUN  14  27  16   < >  19   TSH   --    --    --    --   1.03   AST  28  65*  30   < >   --    ALT  57*  104*  89*   < >   --    ALKPHOS  136  132  124   < >   --     < > = values in this interval not displayed.     Results for EMI, FIDELIA EMELYN (MRN [id number]) as of 9/15/2017 11:21   Ref. Range 6/10/2017 08:03   Cyclic Citrullinated Peptide Antibody, IgG Latest Ref Range: <7 U/mL 331 (H)     Reviewed Rheumatology lab flowsheet

## 2018-03-30 NOTE — TELEPHONE ENCOUNTER
"Amelia called triage to leave an FYI messaage for the CORE team.    She stated that she has been cardioverted 3x.  She states that last night she \"could tell she was in aflutter because of the pounding in her temples.\"  She states that this morning she is in afib.  She states that she has a stethoscope so is able to hear rhythm and know her HR.  She states that her HR is < 100 bpm.    She states that she is feeling well.  Since the start of her lasix yesterday, she is down 2.4 lbs.  She states she is not feeling as \"full,\" and her belly is less taut.      VS:  118/85, HR < 100, sats  97%.  "

## 2018-03-30 NOTE — MR AVS SNAPSHOT
After Visit Summary   3/30/2018    Amelia Michel    MRN: [id number]           Patient Information     Date Of Birth          1960        Visit Information        Provider Department      3/30/2018 10:00 AM Pj Cunha MD  [facility]        Today's Diagnoses     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 (H)           Follow-ups after your visit        Follow-up notes from your care team     Return in about 2 months (around 6/8/2018).      Your next 10 appointments already scheduled     Apr 03, 2018 11:30 AM CDT   Cancer Rehab Treatment with Ana Loera, PT   [facility], [place] [facility]hedema ([facility])    [address] [facility]  1st Floor  [address]  [facility] [id number] [phone]            Apr 06, 2018  9:30 AM CDT   (Arrive by 9:15 AM)   MR MYOCARDIUM W CONTRAST with UUMR4, UU CV MR NURSE   [facility], [place], MRI ([facility])    [address] [facility] [id number] [phone]           Take your medicines as usual, unless your doctor tells you not to. Bring a list of your current medicines to your exam (including vitamins, minerals and over-the-counter drugs).  You may or may not receive intravenous (IV) contrast for this exam pending the discretion of the Radiologist.  You do not need to do anything special to prepare.  The MRI machine uses a strong magnet. Please wear clothes without metal (snaps, zippers). A sweatsuit works well, or we may give you a hospital gown.  Please remove any body piercings and hair extensions before you arrive. You will also remove watches, jewelry, hairpins, wallets, dentures, partial dental plates and hearing aids. You may wear contact lenses, and you may be able to wear your rings. We have a safe place to keep your personal items, but it is safer to leave them at home.  **IMPORTANT** THE  INSTRUCTIONS BELOW ARE ONLY FOR THOSE PATIENTS WHO HAVE BEEN PRESCRIBED SEDATION OR GENERAL ANESTHESIA DURING THEIR MRI PROCEDURE:  IF YOUR DOCTOR PRESCRIBED ORAL SEDATION (take medicine to help you relax during your exam):   You must get the medicine from your doctor (oral medication) before you arrive. Bring the medicine to the exam. Do not take it at home. You ll be told when to take it upon arriving for your exam.   Arrive one hour early. Bring someone who can take you home after the test. Your medicine will make you sleepy. After the exam, you may not drive, take a bus or take a taxi by yourself.  IF YOUR DOCTOR PRESCRIBED IV SEDATION:   Arrive one hour early. Bring someone who can take you home after the test. Your medicine will make you sleepy. After the exam, you may not drive, take a bus or take a taxi by yourself.   No eating 6 hours before your exam. You may have clear liquids up until 4 hours before your exam. (Clear liquids include water, clear tea, black coffee and fruit juice without pulp.)  IF YOUR DOCTOR PRESCRIBED ANESTHESIA (be asleep for your exam):   Arrive 1 1/2 hours early. Bring someone who can take you home after the test. You may not drive, take a bus or take a taxi by yourself.   No eating 8 hours before your exam. You may have clear liquids up until 4 hours before your exam. (Clear liquids include water, clear tea, black coffee and fruit juice without pulp.)   You will spend four to five hours in the recovery room.  Please call the Imaging Department at your exam site with any questions.            Apr 10, 2018 12:45 PM CDT   Cancer Rehab Treatment with Ana Loera PT   [facility], [place] Lymphedema ([facility])    [address]  1st Floor  [address]  [facility] [address]   [phone]            Apr 11, 2018 10:00 AM CDT   (Arrive by 9:45 AM)   RETURN ARRHYTHMIA with Juan Eaton MD   [facility] ([facility] [place])    [address]  [address]  [facility] [id number] [phone]            May 24, 2018 12:00 PM CDT   (Arrive by 11:45 AM)   CT CHEST ABDOMEN PELVIS W/O & W CONTRAST with UCCT1   [facility] CT ([facility])    [address]  1st Floor  [facility] [id number] [phone]           Please bring any scans or X-rays taken at other hospitals, if similar tests were done. Also bring a list of your medicines, including vitamins, minerals and over-the-counter drugs. It is safest to leave personal items at home.  Be sure to tell your doctor:   If you have any allergies.   If there s any chance you are pregnant.   If you are breastfeeding.  You may have contrast for this exam. To prepare:   Do not eat or drink for 2 hours before your exam. If you need to take medicine, you may take it with small sips of water. (We may ask you to take liquid medicine as well.)   The day before your exam, drink extra fluids at least six 8-ounce glasses (unless your doctor tells you to restrict your fluids).   You will be given instructions on how to drink the contrast.  Patients over 70 or patients with diabetes or kidney problems:   If you haven t had a blood test (creatinine test) within the last 30 days, the Cardiologist/Radiologist may require you to get this test prior to your exam.  If you have diabetes:   Continue to take your metformin medication on the day of your exam  Please wear loose clothing, such as a sweat suit or jogging clothes. Avoid snaps, zippers and other metal. We may ask you to undress and put on a hospital gown.  If you have any questions, please call the Imaging Department where you will have your exam.            May 24, 2018  3:00 PM CDT   [facility] Draw with  [facility] DRAW   [facility] Draw ([facility])    [address]  [address]  [facility] [id number] [phone]             May 24, 2018  3:30 PM CDT   RETURN ONC with Lenore Rodriguez MD   [facility] Blood and Marrow Transplant ([facility])    [address]  [address]  [facility] [place] [id number] [phone]            May 31, 2018  4:30 PM CDT   (Arrive by 4:15 PM)   Return Visit with Nayeli Pritchett MD   [facility] Rheumatology ([facility])    [address]  [address]  [facility] [place] [id number] [phone]            Jun 06, 2018  8:45 AM CDT   (Arrive by 8:30 AM)   Cystoscopy with Vadim Sparks MD   [facility] ([facility])    [address]  4th Floor  [facility] [place] [id number] [phone]              Future tests that were ordered for you today     Open Future Orders        Priority Expected Expires Ordered    Basic metabolic panel Routine 4/4/2018 4/6/2018 3/29/2018            Who to contact     If you have questions or need follow up information about today's clinic visit or your schedule please contact [facility] RHEUMATOLOGY directly at [phone].  Normal or non-critical lab and imaging results will be communicated to you by EverTruehart, letter or phone within 4 business days after the clinic has received the results. If you do not hear from us within 7 days, please contact the clinic through EverTruehart or phone. If you have a critical or abnormal lab result, we will notify you by phone as soon as possible.  Submit refill requests through [employer] or call your pharmacy and they will forward the refill request to us. Please allow 3 business days for your refill to be completed.          Additional Information About Your Visit        EverTrueharFusion Dynamic Information     [employer] gives you secure access to your electronic health record. If you see a primary care provider, you can also send messages to your care team and make appointments. If you have questions, please call your  primary care clinic.  If you do not have a primary care provider, please call [phone] and they will assist you.        Care EveryWhere ID     This is your Care EveryWhere ID. This could be used by other organizations to access your [place] medical records  [id number]        Your Vitals Were     Pulse Pulse Oximetry BMI (Body Mass Index)             104 98% 25.48 kg/m2          Blood Pressure from Last 3 Encounters:   03/30/18 127/85   03/23/18 125/90   03/23/18 (!) 89/65    Weight from Last 3 Encounters:   03/30/18 55.3 kg (121 lb 14.4 oz)   03/29/18 56.1 kg (123 lb 10.9 oz)   03/23/18 56.1 kg (123 lb 11.2 oz)              Today, you had the following     No orders found for display         Where to get your medicines      These medications were sent to [address] [place] [address] [address] [place] [address]    Hours:  TRANSPLANT PHONE NUMBER [phone] Phone:  [phone]     hydroxychloroquine 200 MG tablet    predniSONE 5 MG tablet          Primary Care Provider Office Phone # Fax #    Comfort Sabillon [phone] [address] [facility] [facility] [address]        Equal Access to Services     CATINA HEAD AH: Hadii aad ku hadasho Soomaali, waaxda luqadaha, qaybta kaalmada adeegyada, waxay ednain haynegin inman. So [facility] [phone].    ATENCIÓN: Si habla español, tiene a sarmiento disposición servicios gratuitos de asistencia lingüística. Llame al [phone].    We comply with applicable federal civil rights laws and [place] laws. We do not discriminate on the basis of race, color, national origin, age, disability, sex, sexual orientation, or gender identity.            Thank you!     Thank you for choosing [facility]  for your care. Our goal is always to provide you with excellent care. Hearing back from our patients is one way we can continue to improve our services. Please take a few  minutes to complete the written survey that you may receive in the mail after your visit with us. Thank you!             Your Updated Medication List - Protect others around you: Learn how to safely use, store and throw away your medicines at www.disposemymeds.org.          This list is accurate as of 3/30/18 11:22 AM.  Always use your most recent med list.                   Brand Name Dispense Instructions for use Diagnosis    acetaminophen 325 MG tablet    TYLENOL     Take 2 tablets (650 mg) by mouth every 4 hours as needed for mild pain, fever or headaches    Diffuse large B-cell lymphoma of extranodal site (H)       apixaban ANTICOAGULANT 5 MG tablet    ELIQUIS    180 tablet    Take 1 tablet (5 mg) by mouth 2 times daily    Paroxysmal atrial fibrillation (H)       ascorbic acid 500 MG Tabs     30 tablet    Take 1 tablet (500 mg) by mouth daily    Diffuse large B-cell lymphoma, unspecified body region (H)       atenolol 25 MG tablet    TENORMIN    60 tablet    Take 1 tablet (25 mg) by mouth 2 times daily    Atrial tachycardia (H)       calcium polycarbophil 625 MG tablet    FIBERCON     Take 1 tablet by mouth 2 times daily        calcium-vitamin D 600-400 MG-UNIT per tablet    CALTRATE    60 tablet    Take 2 tablets by mouth every morning    Diffuse large B-cell lymphoma, unspecified body region (H)       colchicine 0.6 MG tablet     30 tablet    Take 1 tablet (0.6 mg) by mouth daily    Acute pericarditis, unspecified type       furosemide 20 MG tablet    LASIX    90 tablet    Take 1 tablet (20 mg) by mouth daily    Chronic systolic heart failure (H)       gabapentin 100 MG capsule    NEURONTIN    180 capsule    Take 2 capsules (200 mg) by mouth 3 times daily    Critical illness myopathy       HYDROCORTISONE PO      Take 100 mg by mouth IV        hydroxychloroquine 200 MG tablet    PLAQUENIL    60 tablet    Take 1 tablet (200 mg) by mouth 2 times daily    Rheumatoid arthritis involving both hands with positive  rheumatoid factor (H)       multivitamin, therapeutic with minerals Tabs tablet     30 each    Take 1 tablet by mouth daily    Diffuse large B-cell lymphoma, unspecified body region (H)       order for DME     1 Device    Equipment being ordered: BP cuff    Hypertension, unspecified type       predniSONE 5 MG tablet    DELTASONE    90 tablet    Take 1 tablet (5 mg) by mouth daily    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 (H)       spironolactone 25 MG tablet    ALDACTONE    30 tablet    Take 1 tablet (25 mg) by mouth daily    Acute on chronic diastolic heart failure (H)       tolterodine 4 MG 24 hr capsule    DETROL LA     Take 4 mg by mouth daily

## 2018-03-31 ENCOUNTER — HEALTH MAINTENANCE LETTER (OUTPATIENT)
Age: 58
End: 2018-03-31

## 2018-04-03 ENCOUNTER — TELEPHONE (OUTPATIENT)
Dept: CARDIOLOGY | Facility: CLINIC | Age: 58
End: 2018-04-03

## 2018-04-03 DIAGNOSIS — G72.81 CRITICAL ILLNESS MYOPATHY: ICD-10-CM

## 2018-04-03 RX ORDER — GABAPENTIN 100 MG/1
CAPSULE ORAL
Qty: 180 CAPSULE | Refills: 3 | Status: SHIPPED | OUTPATIENT
Start: 2018-04-03 | End: 2018-07-31

## 2018-04-03 NOTE — TELEPHONE ENCOUNTER
Patient called back again today stating she has been in Afib since last Thursday the 29th.  She said he heart rate has been in the 130's and she is experiencing headaches.  She denied shortness of breath.  I talked to Gonsalo Reyes who stated a homecare nurse is reporting to patients home tomorrow.   Patient reports bps' averaging between 115-118/80-85

## 2018-04-04 ENCOUNTER — HOSPITAL ENCOUNTER (OUTPATIENT)
Facility: CLINIC | Age: 58
End: 2018-04-04
Attending: INTERNAL MEDICINE | Admitting: INTERNAL MEDICINE

## 2018-04-04 ENCOUNTER — OFFICE VISIT (OUTPATIENT)
Dept: CARDIOLOGY | Facility: CLINIC | Age: 58
End: 2018-04-04
Attending: INTERNAL MEDICINE
Payer: COMMERCIAL

## 2018-04-04 VITALS
DIASTOLIC BLOOD PRESSURE: 86 MMHG | BODY MASS INDEX: 24.62 KG/M2 | HEIGHT: 58 IN | WEIGHT: 117.3 LBS | SYSTOLIC BLOOD PRESSURE: 109 MMHG | OXYGEN SATURATION: 96 % | HEART RATE: 141 BPM

## 2018-04-04 DIAGNOSIS — I48.0 PAROXYSMAL ATRIAL FIBRILLATION (H): Primary | ICD-10-CM

## 2018-04-04 DIAGNOSIS — I48.92 ATRIAL FLUTTER, UNSPECIFIED TYPE (H): ICD-10-CM

## 2018-04-04 PROCEDURE — G0180 MD CERTIFICATION HHA PATIENT: HCPCS | Performed by: FAMILY MEDICINE

## 2018-04-04 PROCEDURE — 99214 OFFICE O/P EST MOD 30 MIN: CPT | Mod: GC | Performed by: INTERNAL MEDICINE

## 2018-04-04 PROCEDURE — G0463 HOSPITAL OUTPT CLINIC VISIT: HCPCS | Mod: 25,ZF

## 2018-04-04 PROCEDURE — 93010 ELECTROCARDIOGRAM REPORT: CPT | Mod: ZP | Performed by: INTERNAL MEDICINE

## 2018-04-04 PROCEDURE — 93005 ELECTROCARDIOGRAM TRACING: CPT | Mod: ZF

## 2018-04-04 RX ORDER — MAGNESIUM SULFATE HEPTAHYDRATE 40 MG/ML
2 INJECTION, SOLUTION INTRAVENOUS
Status: CANCELLED | OUTPATIENT
Start: 2018-04-04

## 2018-04-04 RX ORDER — LIDOCAINE 40 MG/G
CREAM TOPICAL
Status: CANCELLED | OUTPATIENT
Start: 2018-04-04

## 2018-04-04 RX ORDER — DIGOXIN 125 MCG
TABLET ORAL
Qty: 90 TABLET | Refills: 3 | Status: SHIPPED | OUTPATIENT
Start: 2018-04-04 | End: 2019-01-16

## 2018-04-04 RX ORDER — POTASSIUM CHLORIDE 1500 MG/1
20 TABLET, EXTENDED RELEASE ORAL
Status: CANCELLED | OUTPATIENT
Start: 2018-04-04

## 2018-04-04 RX ORDER — POTASSIUM CHLORIDE 1500 MG/1
40 TABLET, EXTENDED RELEASE ORAL
Status: CANCELLED | OUTPATIENT
Start: 2018-04-04

## 2018-04-04 ASSESSMENT — ENCOUNTER SYMPTOMS
FEVER: 0
WEIGHT LOSS: 0
ORTHOPNEA: 0
CHILLS: 0
BRUISES/BLEEDS EASILY: 1
SYNCOPE: 0
SLEEP DISTURBANCES DUE TO BREATHING: 0
MUSCLE WEAKNESS: 1
DIARRHEA: 1
HALLUCINATIONS: 0
DYSURIA: 0
STIFFNESS: 1
NIGHT SWEATS: 0
HYPOTENSION: 0
HEMATURIA: 0
BOWEL INCONTINENCE: 0
HEARTBURN: 0
LIGHT-HEADEDNESS: 0
JAUNDICE: 0
HYPERTENSION: 1
JOINT SWELLING: 0
FATIGUE: 1
SWOLLEN GLANDS: 0
BLOOD IN STOOL: 0
CONSTIPATION: 0
RECTAL PAIN: 0
POLYPHAGIA: 0
LEG PAIN: 0
NECK PAIN: 0
ALTERED TEMPERATURE REGULATION: 0
BACK PAIN: 0
ARTHRALGIAS: 1
POLYDIPSIA: 0
MUSCLE CRAMPS: 0
BLOATING: 1
WEIGHT GAIN: 1
NAUSEA: 0
INCREASED ENERGY: 1
VOMITING: 0
DECREASED APPETITE: 0
MYALGIAS: 1
DIFFICULTY URINATING: 0
EXERCISE INTOLERANCE: 1
ABDOMINAL PAIN: 0
FLANK PAIN: 0
PALPITATIONS: 1

## 2018-04-04 ASSESSMENT — PAIN SCALES - GENERAL: PAINLEVEL: NO PAIN (0)

## 2018-04-04 NOTE — NURSING NOTE
Chief Complaint   Patient presents with     Follow Up For     AF recurrence. DCCV 3/23/18. Recent hospitalization early 3/2017 HF exacerbation, AF-requiring DCCV 3/12/18.     Vitals were taken and medications were reconciled. EKG was performed    ROSA Aguilar  11:40 AM

## 2018-04-04 NOTE — PROGRESS NOTES
Electrophysiology Clinic Note  HPI:   Ms. Michel is a 56 year old female who has a past medical history significant for VSD s/p repair 1969, RA, HTN, insomnia, atrial tachyarrhythmias, cardiac arrest, and DLBCL.     She was admitted from 5/15/17-5/23/17 with atrial tachyarrhythmias (SVT, AF, AFL) with symptoms of palpitations, fatigue, and nausea that she had intermittently starting 3 weeks prior to admission. An echo showed LVEF 40-45%, mildly reduced RVEF, moderate biatrial enlargement, mild mitral regurgitation, and moderate tricuspid regurgitation. Prior CMRI from 2015 showed normal function and no significant valvular abnormalities. She was started on Eliquis. She underwent a BRE/DCCV on 5/17/17 c/b hypotension. She then had SVT HR 170s and then went into AF/AFL all within 24 hours after DCCV. She was started on Sotalol and chemically cardioverted. However, she had nausea and thought it was from the Sotalol so this was stopped. She reverted back to AF/AFL and a rate control strategy was adopted. She proved difficult to rate control and remained highly symptomatic. Therefore, she was started on amiodarone. She then converted back to sinus. She continued to report fatigued and nausea despite being in sinus. Given difficult to control arrhthymias, CMRI done showed LVEF 55%, mildly dilated RV with focal area of dyskinesis in RVOT, severe bi-atrial enlargement, severe TR, mild/moderate MR, and DE at RV septal insertion site. RFA was discussed but was defered as patient had a UTI at the time with ESBL. CRP was elevated and remained elevated 72 at discharge. She was sent with Macrobid.  She was then readmitted on 6/19/17-8/4/17 after suffering an out of hospital cardiac arrest. Her  found her gasping for air and unresponsive in bed. He moved her off the bed, started CPR, and activated EMS. Upon EMS arrival, she was in VF. She was externally defibrillated and had ROSC in the field. She was intubated and brought  to [facility]. In the [facility], she was hypotensive and found to be in escape rhythm 43 bpm. She was taken emergently to cath lab where coronary angiogram showed normal coronary arteries. Temporary pacemaker was placed in RA given sinus arrest. She was also placed on therapeutic hypothermia. Her  reported that she had a syncopal episode while sitting in a chair on 5/26/17 that she did not seek care for. She had been having nausea, diarrhea, and intermittent vomiting over the last week and generally had not been feeling well over the last month. She has also had saddle anesthesia with lower extremity numbness that has been evaluated by neuro as an outpatient. She was started on gabapentin but no conclusive reason. She was noted to have shock liver, CELSA requiring HD, elevated CRP. She continued to have intermittent episodes of AF. Her amiodarone was stopped. She had fevers, cytopenias and was ultimately found to have DLBCL. She was started on chemo cycles. 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 During hospitalization she also developed IV extravasation and necrotic wound. She also struggled with significant hypervolemia/anesarca. She ultimately discharged to TCU on a lifevest. She  was then readmitted on 8/8/17-8/12/17 with strep-mitis intermediate resistance bacteremia felt related to PICC line and neutropenic fevers. She was treated and ultimately discharged. She was then admitted from 9/7/17-9/11/17 with RUE DVT. An echo from 9/11/17 showed LVEF 60-65%, mild pulm HTN, and dilated IVC. She followed up with EP in 9/2017 and was making good strides in her recovery. She still had LUE wound which was still open. She reported that the lifevest was uncomfortable and she had returned it.     EP Visit 4/4/18:  Since her last visit, when Digoxin was held (in view of its interaction with the recently started Plaquenil), she has had three cardioversions. Over the last  one month, she has been to the ED on one occasion for AFib, was admitted with a heart failure exacerbation and cardioverted when she went into AF/RVR while in the hospital, and then on her [facility] clinic follow up was found to be in AF and cardioverted again. She says she has been feeling like she is in AFib since last Thursday again (6 days). She denies any significant SOB, LE edema, orthopnea, PND, chest pain, dizziness, lightheadedness, syncope or near syncope.         PAST MEDICAL HISTORY:  Past Medical History:   Diagnosis Date     Atrial fibrillation (H)      Cancer (H)     lymphoma     Cardiac arrest (H)      Hypertension      Neuropathy      Rheumatoid arthritis(714.0)        CURRENT MEDICATIONS:  Current Outpatient Prescriptions   Medication Sig Dispense Refill     gabapentin (NEURONTIN) 100 MG capsule TAKE TWO CAPSULES BY MOUTH THREE TIMES A  capsule 3     hydroxychloroquine (PLAQUENIL) 200 MG tablet Take 1 tablet (200 mg) by mouth 2 times daily 60 tablet 5     predniSONE (DELTASONE) 5 MG tablet Take 1 tablet (5 mg) by mouth daily 90 tablet 2     furosemide (LASIX) 20 MG tablet Take 1 tablet (20 mg) by mouth daily 90 tablet 3     colchicine 0.6 MG tablet Take 1 tablet (0.6 mg) by mouth daily 30 tablet 0     spironolactone (ALDACTONE) 25 MG tablet Take 1 tablet (25 mg) by mouth daily 30 tablet 0     tolterodine (DETROL LA) 4 MG 24 hr capsule Take 4 mg by mouth daily        atenolol (TENORMIN) 25 MG tablet Take 1 tablet (25 mg) by mouth 2 times daily 60 tablet 3     apixaban ANTICOAGULANT (ELIQUIS) 5 MG tablet Take 1 tablet (5 mg) by mouth 2 times daily 180 tablet 3     order for DME Equipment being ordered: BP cuff 1 Device 1     HYDROCORTISONE PO Take 100 mg by mouth IV       acetaminophen (TYLENOL) 325 MG tablet Take 2 tablets (650 mg) by mouth every 4 hours as needed for mild pain, fever or headaches       calcium-vitamin D (CALTRATE) 600-400 MG-UNIT per tablet Take 2 tablets by mouth every morning  60 tablet 0     multivitamin, therapeutic with minerals (THERA-VIT-M) TABS tablet Take 1 tablet by mouth daily 30 each 0     ascorbic acid 500 MG TABS Take 1 tablet (500 mg) by mouth daily 30 tablet 0     calcium polycarbophil (FIBERCON) 625 MG tablet Take 1 tablet by mouth 2 times daily         PAST SURGICAL HISTORY:  Past Surgical History:   Procedure Laterality Date     ANESTHESIA CARDIOVERSION N/A 5/17/2017    Procedure: ANESTHESIA CARDIOVERSION;  ANESTHESIA CARDIOVERSION;  Surgeon: GENERIC ANESTHESIA PROVIDER;  Location: [facility] OR     ANESTHESIA CARDIOVERSION  3/12/2018    Procedure: ANESTHESIA CARDIOVERSION;;  Surgeon: GENERIC ANESTHESIA PROVIDER;  Location: [facility] OR     ANESTHESIA CARDIOVERSION N/A 3/23/2018    Procedure: ANESTHESIA CARDIOVERSION;  Anesthesia Cardioversion ;  Surgeon: GENERIC ANESTHESIA PROVIDER;  Location: [facility] OR     ARTHRODESIS WRIST  2000    Right wrist     BONE MARROW ASPIRATE &BIOPSY  7/12/2017          FOOT SURGERY      4 left and 2 right     RELEASE CARPAL TUNNEL BILATERAL       REPAIR VENTRICULAR SEPTAL DEFECT  1969       ALLERGIES:     Allergies   Allergen Reactions     Blood Transfusion Related (Informational Only) Hives     Hives with platelets. Give benadryl premedication.     Metoprolol Other (See Comments)     Pt and  report that metoprolol does not work for her and also reports feeling unwell with this medication. She has been able to tolerate atenolol, which as worked in controlling her HR.      No Clinical Screening - See Comments      Penicillin Allergy Skin Test not performed, see antimicrobial management team progress note 7/5/17.       Penicillins      Tape [Adhesive Tape] Rash       FAMILY HISTORY:  Family History   Problem Relation Age of Onset     Breast Cancer Mother      Hypertension Mother      Alzheimer Disease Mother      Hypertension Father      Blood Disease Father      Lymphoa     Circulatory Father      A Fib     DIABETES Paternal Grandmother        SOCIAL  "HISTORY:  Social History   Substance Use Topics     Smoking status: Never Smoker     Smokeless tobacco: Never Used     Alcohol use Yes      Comment: Glass of wine two evenings a night       ROS:   A comprehensive 10 point review of systems negative other than as mentioned in HPI.  Exam:  /86 (BP Location: Right arm, Cuff Size: Adult Small)  Pulse 141  Ht 1.473 m (4' 10\")  Wt 53.2 kg (117 lb 4.8 oz)  SpO2 96%  BMI 24.52 kg/m2  GENERAL APPEARANCE: alert and no distress  HEENT: alopecia, MMM, PERRLA.   NECK:supple.   RESPIRATORY: lungs clear to auscultation - no rales, rhonchi or wheezes, no use of accessory muscles, no retractions, respirations are unlabored, normal respiratory rate  CARDIOVASCULAR: +tachy, normal S1 with physiologic split S2, no S3 or S4 and no murmur, click or rub, precordium quiet with normal PMI.  ABDOMEN: soft, non tender,bowel sounds normal  EXTREMITIES: peripheral pulses normal, trace pedal edema, LUE wound with dressing CDI.  NEURO: alert and oriented to person/place/time, normal speech, gait and affect  SKIN: pale, fragile, no rashes    Labs:  Reviewed.     Testing/Procedures:  9/11/17 ECHOCARDIOGRAM:   Interpretation Summary  Left ventricular function, chamber size, wall motion, and wall thickness are  normal.The EF is 60-65% (traced 60%.) GLS -18%.  The right ventricle is normal size and normal in function. The RV is mildly  dilated.  Moderate tricuspid insufficiency is present.  Mild pulmonary hypertension is present (esimated PASP 55 mmHg including RA  pressure.)  Dilation of the inferior vena cava is present with abnormal respiratory  variation in diameter (esitimated RAP 15 mmHg.)  This study was compared with the study from 8/31/17: TR appears slightly  decreased from the earlier study.    7/24/17 CMRI:     1. The LV is normal in cavity size and wall thickness. The global systolic function is normal. The LVEF is  64%. There are no regional wall motion abnormalities. No " evidence of myocardial iron overload.      2. The RV is normal in cavity size. The global systolic function is normal. The RVEF is 59%.      3. There is moderate dilation of bilateral atria.      4. Tricuspid regurgitation is present, difficult to quantify due to image quality.      5. Late gadolinium enhancement imaging shows RV insertion site hyperenhancement, a non-specific finding  seen in patients with RV volume/pressure overload.      6. There is a moderate right pleural effusion and small left pleural effusion.      CONCLUSIONS: Normal Bi-Ventricular systolic function, LVEF 64% and RVE 59%. There is no evidence of  infiltrative disease. Compared to the MRI from 5/15/2017, there is no significant change.     7/2017 BRE:  Interpretation Summary  There is a small mass (0.7 cm by 0.2 cm) on the ventricular side of the right  coronary cusp. There is a second mass (0.4 cm by 0.2 cm) observed in the LVOT,  attached to the mitro-aortic curtain. There is a third mass on the noncoronary  cusp that measures 0.4 cm by 0.2 cm that could be a healing vegetation. These  findings are compatible with endocarditis if clinical picture supports.  Right ventricular function, chamber size, wall motion, and thickness are  normal.  This study was compared with the study from 7/10/2017.  There is detection of masses on the aortic valve and LVOT.      Assessment and Plan:   Ms. Michel is a 56 year old female who has a past medical history significant for VSD s/p repair 1969, RA, HTN, insomnia, atrial tachyarrhythmias, cardiac arrest, and newly diagnosed DLBCL.   She initially presented in 5/2017 with symptoamtic atrial tachyarrhythmias (SVT, AF, AFL). She underwent DCCV with early recurrence and was started on AAT. She was thought to not tolerate Sotalol and was switched to amiodarone. However, she continued to have fatigue, nausea, and general malaise despite being in sinus. She then suffered out of hospital cardiac arrest and was  found in VF by EMS and defibrillated in field. She had a prolonged hospital course c/b anasarca, CELSA, shock liver, fevers, cytopenias and was ultimately found to have DLBCL and started on treatment. She presents today for follow up. She reports feeling well. She continues to have LUE wound and will be meeting with Plastics. She has finished her chemo cycles with repeat imaging demonstrating no evidence of active lymphoma.   Since her Digoxin was stopped on her last visit (in view of its interaction with the recently started Plaquenil), she has had to be cardioverted three times over the last month for symptomatic AFib. Today her EKG demonstrated AFl again with rate in the 140's. She says her HR was controlled until today, but for the last 6 days she has felt like she is in AFib/AFl.     Atrial Fibrillation:  We discussed in detail with the patient management/treatment options for Krystyna includin. Stroke Prophylaxis:  CHADSVASC=+HTN, +gender  2, corresponding to a 2.2% annual stroke / systemic emolism event rate. indicating need for long term oral anticoagulation.  She is now able to be anticoagulated per her Oncology team. We will continue Eliquis 5 mg BID.   2. Rate Control: Continue Atenolol and resume Digoxin. Since holding Digoxin, she has had several symptomatic episodes of AF/AFl, and over the last one month has had to be cardioverted on three occasions. We will start by loading with Dig 250 for two days, followed by 125 daily. Given the fact that she is on Plaquenil, we will plan to check Dig level next week.   3. Rhythm Control: Cardioversion, Antiarrhythmics and/or ablation are options for rhythm control. We will plan for DCCV next week, while on Digoxin. She has been on anticoagulation with Eliquis (no interruptions), therefore will not need a BRE.   Notably, she has possible side effects from Sotalol (however, likely was due to underlying illness at the time and not from medication). Was on amiodarone  with continued episodes and stopped. If she continues to have frequent symptomatic episodes of AF/AFl, given her history of adverse reactions to AAT, she may be a candidate for ablation.   She has an MRI scheduled this Friday, we will postpone it to until after the DCCV next week.     4. Risk Factor Management: maintain tight BP control, and PB evaluation as indicated.         She will follow up with heme/onc within a month to assess for any recurrence, given the increased frequency of her arrhythmias.  She will follow up with Dr. Eaton in one month.     The patient states understanding and is agreeable with plan.   This patient was seen and evaluated with Dr. Eaton. The above note reflects our joint assessment and plan.     [place] MD  Cardiovascular Disease Fellow  Pager: [phone]    EP STAFF NOTE  Patient seen and examined and management plan personally reviewed with the patient. I agree with the note above by the CV/EP fellow.  We will get her back on digoxin since she was fairly calm when she was taking it, and will monitor levels. Will will follow up closely in a month or so. Meanwhile, she would have seen her primary oncologist to see whether there is any recurrence. In case there isn't and she still has recurrences of afib, we will pursue ablation after discussion with the patient as she understandably does not want to be on AAT.  Juan Eaton MD [facility]  Cardiology - Electrophysiology

## 2018-04-04 NOTE — PATIENT INSTRUCTIONS
Cardiology Provider you saw in clinic today: Dr. Eaton    Medication Changes:     1. Start digoxin 250mcg daily for 2 days, then 125mcg daily    Labs/Tests needed:     1. Cardioversion next week, reschedule cMRI to follow      Follow-up Visit:  1 month after cardioversion        You are scheduled for a Cardioversion at the [facility] ([address], [place] [address], [phone]).       Follow these instructions:    1. Report to the [facility] waiting room in the [facility] hospital on: _April 12, 2018__________    2. DO NOT EAT OR DRINK ANYTHING FOR 8 HOURS PRIOR TO ARRIVAL.     3. The morning of your procedure you may take your scheduled medications with a SIP of water. If you take diabetic medications or a diuretic, you may hold these.     4. You will receive medication that makes you sleepy; you will need a  and someone to stay with you for 24 hours following this procedure.    You should not make any legal decisions for 24 hours following discharge.       If you have further questions, please utilize [employer] to contact us.   If your question concerns the above instructions, contact:  Stacie Delgado RN   Electrophysiology Nurse Coordinator.  [phone]    If your question concerns the schedule/appointment times, contact:  GERA Choi Procedure   [phone]

## 2018-04-04 NOTE — MR AVS SNAPSHOT
After Visit Summary   4/4/2018    Amelia Michel    MRN: [id number]           Patient Information     Date Of Birth          1960        Visit Information        Provider Department      4/4/2018 11:30 AM Juan Eaton MD [facility]        Today's Diagnoses     Paroxysmal atrial fibrillation (H)    -  1    Atrial flutter (H)          Care Instructions    Cardiology Provider you saw in clinic today: Dr. Eaton    Medication Changes:     1. Start digoxin 250mcg daily for 2 days, then 125mcg daily    Labs/Tests needed:     1. Cardioversion next week, reschedule cMRI to follow      Follow-up Visit:  1 month after cardioversion        You are scheduled for a Cardioversion at the [facility] ([address], [facility] [address], [phone]).       Follow these instructions:    1. Report to the GOLD waiting room in the [facility] hospital on: _April 12, 2018__________    2. DO NOT EAT OR DRINK ANYTHING FOR 8 HOURS PRIOR TO ARRIVAL.     3. The morning of your procedure you may take your scheduled medications with a SIP of water. If you take diabetic medications or a diuretic, you may hold these.     4. You will receive medication that makes you sleepy; you will need a  and someone to stay with you for 24 hours following this procedure.    You should not make any legal decisions for 24 hours following discharge.       If you have further questions, please utilize [email or web address] to contact us.   If your question concerns the above instructions, contact:  Stacie Delgado RN   Electrophysiology Nurse Coordinator.  [phone]    If your question concerns the schedule/appointment times, contact:  GERA Choi Procedure   [phone]            Follow-ups after your visit        Additional Services     Follow-Up with Electrophysiologgonzalez Eaton                  Your next 10 appointments already scheduled     Apr 10, 2018 12:45 PM CDT   Cancer Rehab Treatment with  Ana Loera, PT   [facility]    [address] [facility]  1st Floor  [address]  [facility] [address]   [phone]            Apr 12, 2018  7:00 AM CDT   LAB with UU LAB GOLD WAITING   [facility], Lab ([facility])    [address] [facility] [address]              Please do not eat 10-12 hours before your appointment if you are coming in fasting for labs on lipids, cholesterol, or glucose (sugar). This does not apply to pregnant women. Water, hot tea and black coffee (with nothing added) are okay. Do not drink other fluids, diet soda or chew gum.            Apr 12, 2018  7:30 AM CDT   Procedure 4 hour with U2A ROOM 6   Unit 2A [facility] [place] ([facility])    [address] [facility] [address]               Apr 12, 2018   Procedure with GENERIC ANESTHESIA PROVIDER   [facility] [place], Same Day Surgery (--)    [address] [facility] [address] [phone]            Apr 12, 2018  8:30 AM CDT   Cardioversion no BRE University with UUETEER1   [facility],  Echocardiography ([facility])    [address] [facility] [address]   [phone]           1) NPO for 8 hours prior to the procedure except for sips of water with medications. 2) Hold insulin or oral diabetic medications morning of procedure. May take   dose long acting insulin. Follow further instructions of PMD. 3) Continue daily Coumadin. ~ If taking Digoxin, hold AM of procedure. ~Plan to have a responsible adult take you home after the exam. You may not drive, take a bus or taxi by yourself. A responsible adult must stay with you for 24 hours after the test.            Apr 12, 2018 11:00 AM CDT   (Arrive by 10:45 AM)   MR MYOCARDIUM W CONTRAST with UUMR4, UU CV MR NURSE   [facility] [place], MRI ([facility])    [address] [facility] [id number] [phone]           Take your medicines as usual, unless your doctor tells you not to. Bring a list of your current medicines to your exam (including vitamins, minerals and over-the-counter drugs).  You may or may not receive intravenous (IV) contrast for this exam pending the discretion of the Radiologist.  You do not need to do anything special to prepare.  The MRI machine uses a strong magnet. Please wear clothes without metal (snaps, zippers). A sweatsuit works well, or we may give you a hospital gown.  Please remove any body piercings and hair extensions before you arrive. You will also remove watches, jewelry, hairpins, wallets, dentures, partial dental plates and hearing aids. You may wear contact lenses, and you may be able to wear your rings. We have a safe place to keep your personal items, but it is safer to leave them at home.  **IMPORTANT** THE INSTRUCTIONS BELOW ARE ONLY FOR THOSE PATIENTS WHO HAVE BEEN PRESCRIBED SEDATION OR GENERAL ANESTHESIA DURING THEIR MRI PROCEDURE:  IF YOUR DOCTOR PRESCRIBED ORAL SEDATION (take medicine to help you relax during your exam):   You must get the medicine from your doctor (oral medication) before you arrive. Bring the medicine to the exam. Do not take it at home. You ll be told when to take it upon arriving for your exam.   Arrive one hour early. Bring someone who can take you home after the test. Your medicine will make you sleepy. After the exam, you may not drive, take a bus or take a taxi by yourself.  IF YOUR DOCTOR PRESCRIBED IV SEDATION:   Arrive one hour early. Bring someone who can take you home after the test. Your medicine will make you sleepy. After the exam, you may not drive, take a bus or take a taxi by yourself.   No eating 6 hours before your exam. You may have clear liquids up until 4 hours before your exam. (Clear liquids include water, clear tea, black  coffee and fruit juice without pulp.)  IF YOUR DOCTOR PRESCRIBED ANESTHESIA (be asleep for your exam):   Arrive 1 1/2 hours early. Bring someone who can take you home after the test. You may not drive, take a bus or take a taxi by yourself.   No eating 8 hours before your exam. You may have clear liquids up until 4 hours before your exam. (Clear liquids include water, clear tea, black coffee and fruit juice without pulp.)   You will spend four to five hours in the recovery room.  Please call the Imaging Department at your exam site with any questions.            Apr 24, 2018  1:00 PM CDT   (Arrive by 12:45 PM)   CORE RETURN with Xena Longo NP   [facility] ([facility])    [address] [place] [address]  [address] [place] [address] [phone]            May 16, 2018 11:00 AM CDT   (Arrive by 10:45 AM)   RETURN ARRHYTHMIA with Juan Eaton MD   [facility] ([facility])    [address] [place] [address]  [address] [place] [id number] [phone]            May 24, 2018 12:00 PM CDT   (Arrive by 11:45 AM)   CT CHEST ABDOMEN PELVIS W/O & W CONTRAST with UCCT1   [facility] ([facility])    [address] [place] [address]  1st Floor  [facility] [address] [phone]           Please bring any scans or X-rays taken at other hospitals, if similar tests were done. Also bring a list of your medicines, including vitamins, minerals and over-the-counter drugs. It is safest to leave personal items at home.  Be sure to tell your doctor:   If you have any allergies.   If there s any chance you are pregnant.   If you are breastfeeding.  You may have contrast for this exam. To prepare:   Do not eat or drink for 2 hours before your exam. If you need to take medicine, you may take it with small sips of water. (We may ask you to take liquid medicine as well.)   The day before your exam, drink extra fluids at least six 8-ounce  glasses (unless your doctor tells you to restrict your fluids).   You will be given instructions on how to drink the contrast.  Patients over 70 or patients with diabetes or kidney problems:   If you haven t had a blood test (creatinine test) within the last 30 days, the Cardiologist/Radiologist may require you to get this test prior to your exam.  If you have diabetes:   Continue to take your metformin medication on the day of your exam  Please wear loose clothing, such as a sweat suit or jogging clothes. Avoid snaps, zippers and other metal. We may ask you to undress and put on a hospital gown.  If you have any questions, please call the Imaging Department where you will have your exam.              Future tests that were ordered for you today     Open Future Orders        Priority Expected Expires Ordered    Follow-Up with Electrophysiologist Routine 5/2/2018 4/4/2019 4/4/2018            Who to contact     If you have questions or need follow up information about today's clinic visit or your schedule please contact [facility] directly at [phone].  Normal or non-critical lab and imaging results will be communicated to you by MyChart, letter or phone within 4 business days after the clinic has received the results. If you do not hear from us within 7 days, please contact the clinic through Yulexhart or phone. If you have a critical or abnormal lab result, we will notify you by phone as soon as possible.  Submit refill requests through PrePayMe or call your pharmacy and they will forward the refill request to us. Please allow 3 business days for your refill to be completed.          Additional Information About Your Visit        CoWaret Information     PrePayMe gives you secure access to your electronic health record. If you see a primary care provider, you can also send messages to your care team and make appointments. If you have questions, please call your primary care clinic.  If you do not have a  "primary care provider, please call [phone] and they will assist you.        Care EveryWhere ID     This is your Care EveryWhere ID. This could be used by other organizations to access your [facility] medical records  [id number]        Your Vitals Were     Pulse Height Pulse Oximetry BMI (Body Mass Index)          141 1.473 m (4' 10\") 96% 24.52 kg/m2         Blood Pressure from Last 3 Encounters:   04/04/18 109/86   03/30/18 127/85   03/23/18 125/90    Weight from Last 3 Encounters:   04/04/18 53.2 kg (117 lb 4.8 oz)   03/30/18 55.3 kg (121 lb 14.4 oz)   03/29/18 56.1 kg (123 lb 10.9 oz)              We Performed the Following     EKG 12-lead, tracing only (Same Day)          Today's Medication Changes          These changes are accurate as of 4/4/18  1:43 PM.  If you have any questions, ask your nurse or doctor.               Start taking these medicines.        Dose/Directions    digoxin 125 MCG tablet   Commonly known as:  LANOXIN   Used for:  Atrial flutter (H)   Started by:  Juan Eaton MD        Take 250mcg daily for 2 days, then decrease to 125mcg daily   Quantity:  90 tablet   Refills:  3            Where to get your medicines      These medications were sent to [facility] [place], [place] - [address] [address]    Hours:  TRANSPLANT PHONE NUMBER [phone] Phone:  [phone]     digoxin 125 MCG tablet                Primary Care Provider Office Phone # Fax #    Comfort Sabillon [phone] [address]  [facility] [address]        Equal Access to Services     SARAH HEAD AH: Hadii laurita Flores, watashda luqadaha, qaybta kaalmada catalino, durga inman. So [facility] [phone].    ATENCIÓN: Si habla español, tiene a sarmiento disposición servicios gratuitos de asistencia lingüística. Llame al [phone].    We comply with applicable federal civil rights laws and " [place] laws. We do not discriminate on the basis of race, color, national origin, age, disability, sex, sexual orientation, or gender identity.            Thank you!     Thank you for choosing [facility]  for your care. Our goal is always to provide you with excellent care. Hearing back from our patients is one way we can continue to improve our services. Please take a few minutes to complete the written survey that you may receive in the mail after your visit with us. Thank you!             Your Updated Medication List - Protect others around you: Learn how to safely use, store and throw away your medicines at www.disposemymeds.org.          This list is accurate as of 4/4/18  1:43 PM.  Always use your most recent med list.                   Brand Name Dispense Instructions for use Diagnosis    acetaminophen 325 MG tablet    TYLENOL     Take 2 tablets (650 mg) by mouth every 4 hours as needed for mild pain, fever or headaches    Diffuse large B-cell lymphoma of extranodal site (H)       apixaban ANTICOAGULANT 5 MG tablet    ELIQUIS    180 tablet    Take 1 tablet (5 mg) by mouth 2 times daily    Paroxysmal atrial fibrillation (H)       ascorbic acid 500 MG Tabs     30 tablet    Take 1 tablet (500 mg) by mouth daily    Diffuse large B-cell lymphoma, unspecified body region (H)       atenolol 25 MG tablet    TENORMIN    60 tablet    Take 1 tablet (25 mg) by mouth 2 times daily    Atrial tachycardia (H)       calcium polycarbophil 625 MG tablet    FIBERCON     Take 1 tablet by mouth 2 times daily        calcium-vitamin D 600-400 MG-UNIT per tablet    CALTRATE    60 tablet    Take 2 tablets by mouth every morning    Diffuse large B-cell lymphoma, unspecified body region (H)       colchicine 0.6 MG tablet     30 tablet    Take 1 tablet (0.6 mg) by mouth daily    Acute pericarditis, unspecified type       digoxin 125 MCG tablet    LANOXIN    90 tablet    Take 250mcg daily for 2 days, then decrease to 125mcg  daily    Atrial flutter (H)       furosemide 20 MG tablet    LASIX    90 tablet    Take 1 tablet (20 mg) by mouth daily    Chronic systolic heart failure (H)       gabapentin 100 MG capsule    NEURONTIN    180 capsule    TAKE TWO CAPSULES BY MOUTH THREE TIMES A DAY    Critical illness myopathy       HYDROCORTISONE PO      Take 100 mg by mouth IV        hydroxychloroquine 200 MG tablet    PLAQUENIL    60 tablet    Take 1 tablet (200 mg) by mouth 2 times daily    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 (H)       multivitamin, therapeutic with minerals Tabs tablet     30 each    Take 1 tablet by mouth daily    Diffuse large B-cell lymphoma, unspecified body region (H)       order for DME     1 Device    Equipment being ordered: BP cuff    Hypertension, unspecified type       predniSONE 5 MG tablet    DELTASONE    90 tablet    Take 1 tablet (5 mg) by mouth daily    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 (H)       spironolactone 25 MG tablet    ALDACTONE    30 tablet    Take 1 tablet (25 mg) by mouth daily    Acute on chronic diastolic heart failure (H)       tolterodine 4 MG 24 hr capsule    DETROL LA     Take 4 mg by mouth daily

## 2018-04-04 NOTE — LETTER
4/4/2018      RE: Amelia Michel  [address]  [facility] [place] [address]       Dear Colleague,    Thank you for the opportunity to participate in the care of your patient, Amelia Michel, at the [facility]. Please see a copy of my visit note below.    Electrophysiology Clinic Note  HPI:   Ms. Michel is a 56 year old female who has a past medical history significant for VSD s/p repair 1969, RA, HTN, insomnia, atrial tachyarrhythmias, cardiac arrest, and DLBCL.     She was admitted from 5/15/17-5/23/17 with atrial tachyarrhythmias (SVT, AF, AFL) with symptoms of palpitations, fatigue, and nausea that she had intermittently starting 3 weeks prior to admission. An echo showed LVEF 40-45%, mildly reduced RVEF, moderate biatrial enlargement, mild mitral regurgitation, and moderate tricuspid regurgitation. Prior CMRI from 2015 showed normal function and no significant valvular abnormalities. She was started on Eliquis. She underwent a BRE/DCCV on 5/17/17 c/b hypotension. She then had SVT HR 170s and then went into AF/AFL all within 24 hours after DCCV. She was started on Sotalol and chemically cardioverted. However, she had nausea and thought it was from the Sotalol so this was stopped. She reverted back to AF/AFL and a rate control strategy was adopted. She proved difficult to rate control and remained highly symptomatic. Therefore, she was started on amiodarone. She then converted back to sinus. She continued to report fatigued and nausea despite being in sinus. Given difficult to control arrhthymias, CMRI done showed LVEF 55%, mildly dilated RV with focal area of dyskinesis in RVOT, severe bi-atrial enlargement, severe TR, mild/moderate MR, and DE at RV septal insertion site. RFA was discussed but was defered as patient had a UTI at the time with ESBL. CRP was elevated and remained elevated 72 at discharge. She was sent with Macrobid.  She was then  readmitted on 6/19/17-8/4/17 after suffering an out of hospital cardiac arrest. Her  found her gasping for air and unresponsive in bed. He moved her off the bed, started CPR, and activated EMS. Upon EMS arrival, she was in VF. She was externally defibrillated and had ROSC in the field. She was intubated and brought to [facility]. In the U, she was hypotensive and found to be in escape rhythm 43 bpm. She was taken emergently to cath lab where coronary angiogram showed normal coronary arteries. Temporary pacemaker was placed in RA given sinus arrest. She was also placed on therapeutic hypothermia. Her  reported that she had a syncopal episode while sitting in a chair on 5/26/17 that she did not seek care for. She had been having nausea, diarrhea, and intermittent vomiting over the last week and generally had not been feeling well over the last month. She has also had saddle anesthesia with lower extremity numbness that has been evaluated by neuro as an outpatient. She was started on gabapentin but no conclusive reason. She was noted to have shock liver, CELSA requiring HD, elevated CRP. She continued to have intermittent episodes of AF. Her amiodarone was stopped. She had fevers, cytopenias and was ultimately found to have DLBCL. She was started on chemo cycles. 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 During hospitalization she also developed IV extravasation and necrotic wound. She also struggled with significant hypervolemia/anesarca. She ultimately discharged to TCU on a lifevest. She  was then readmitted on 8/8/17-8/12/17 with strep-mitis intermediate resistance bacteremia felt related to PICC line and neutropenic fevers. She was treated and ultimately discharged. She was then admitted from 9/7/17-9/11/17 with RUE DVT. An echo from 9/11/17 showed LVEF 60-65%, mild pulm HTN, and dilated IVC. She followed up with EP in 9/2017 and was making good  strides in her recovery. She still had LUE wound which was still open. She reported that the lifevest was uncomfortable and she had returned it.     EP Visit 4/4/18:  Since her last visit, when Digoxin was held (in view of its interaction with the recently started Plaquenil), she has had three cardioversions. Over the last one month, she has been to the ED on one occasion for AFib, was admitted with a heart failure exacerbation and cardioverted when she went into AF/RVR while in the hospital, and then on her CORE clinic follow up was found to be in AF and cardioverted again. She says she has been feeling like she is in AFib since last Thursday again (6 days). She denies any significant SOB, LE edema, orthopnea, PND, chest pain, dizziness, lightheadedness, syncope or near syncope.         PAST MEDICAL HISTORY:  Past Medical History:   Diagnosis Date     Atrial fibrillation (H)      Cancer (H)     lymphoma     Cardiac arrest (H)      Hypertension      Neuropathy      Rheumatoid arthritis(714.0)        CURRENT MEDICATIONS:  Current Outpatient Prescriptions   Medication Sig Dispense Refill     gabapentin (NEURONTIN) 100 MG capsule TAKE TWO CAPSULES BY MOUTH THREE TIMES A  capsule 3     hydroxychloroquine (PLAQUENIL) 200 MG tablet Take 1 tablet (200 mg) by mouth 2 times daily 60 tablet 5     predniSONE (DELTASONE) 5 MG tablet Take 1 tablet (5 mg) by mouth daily 90 tablet 2     furosemide (LASIX) 20 MG tablet Take 1 tablet (20 mg) by mouth daily 90 tablet 3     colchicine 0.6 MG tablet Take 1 tablet (0.6 mg) by mouth daily 30 tablet 0     spironolactone (ALDACTONE) 25 MG tablet Take 1 tablet (25 mg) by mouth daily 30 tablet 0     tolterodine (DETROL LA) 4 MG 24 hr capsule Take 4 mg by mouth daily        atenolol (TENORMIN) 25 MG tablet Take 1 tablet (25 mg) by mouth 2 times daily 60 tablet 3     apixaban ANTICOAGULANT (ELIQUIS) 5 MG tablet Take 1 tablet (5 mg) by mouth 2 times daily 180 tablet 3     order for DME  Equipment being ordered: BP cuff 1 Device 1     HYDROCORTISONE PO Take 100 mg by mouth IV       acetaminophen (TYLENOL) 325 MG tablet Take 2 tablets (650 mg) by mouth every 4 hours as needed for mild pain, fever or headaches       calcium-vitamin D (CALTRATE) 600-400 MG-UNIT per tablet Take 2 tablets by mouth every morning 60 tablet 0     multivitamin, therapeutic with minerals (THERA-VIT-M) TABS tablet Take 1 tablet by mouth daily 30 each 0     ascorbic acid 500 MG TABS Take 1 tablet (500 mg) by mouth daily 30 tablet 0     calcium polycarbophil (FIBERCON) 625 MG tablet Take 1 tablet by mouth 2 times daily         PAST SURGICAL HISTORY:  Past Surgical History:   Procedure Laterality Date     ANESTHESIA CARDIOVERSION N/A 5/17/2017    Procedure: ANESTHESIA CARDIOVERSION;  ANESTHESIA CARDIOVERSION;  Surgeon: GENERIC ANESTHESIA PROVIDER;  Location: [facility] OR     ANESTHESIA CARDIOVERSION  3/12/2018    Procedure: ANESTHESIA CARDIOVERSION;;  Surgeon: GENERIC ANESTHESIA PROVIDER;  Location: [facility] OR     ANESTHESIA CARDIOVERSION N/A 3/23/2018    Procedure: ANESTHESIA CARDIOVERSION;  Anesthesia Cardioversion ;  Surgeon: GENERIC ANESTHESIA PROVIDER;  Location: [facility] OR     ARTHRODESIS WRIST  2000    Right wrist     BONE MARROW ASPIRATE &BIOPSY  7/12/2017          FOOT SURGERY      4 left and 2 right     RELEASE CARPAL TUNNEL BILATERAL       REPAIR VENTRICULAR SEPTAL DEFECT  1969       ALLERGIES:     Allergies   Allergen Reactions     Blood Transfusion Related (Informational Only) Hives     Hives with platelets. Give benadryl premedication.     Metoprolol Other (See Comments)     Pt and  report that metoprolol does not work for her and also reports feeling unwell with this medication. She has been able to tolerate atenolol, which as worked in controlling her HR.      No Clinical Screening - See Comments      Penicillin Allergy Skin Test not performed, see antimicrobial management team progress note 7/5/17.       Penicillins       "Tape [Adhesive Tape] Rash       FAMILY HISTORY:  Family History   Problem Relation Age of Onset     Breast Cancer Mother      Hypertension Mother      Alzheimer Disease Mother      Hypertension Father      Blood Disease Father      Lymphoa     Circulatory Father      A Fib     DIABETES Paternal Grandmother        SOCIAL HISTORY:  Social History   Substance Use Topics     Smoking status: Never Smoker     Smokeless tobacco: Never Used     Alcohol use Yes      Comment: Glass of wine two evenings a night       ROS:   A comprehensive 10 point review of systems negative other than as mentioned in HPI.  Exam:  /86 (BP Location: Right arm, Cuff Size: Adult Small)  Pulse 141  Ht 1.473 m (4' 10\")  Wt 53.2 kg (117 lb 4.8 oz)  SpO2 96%  BMI 24.52 kg/m2  GENERAL APPEARANCE: alert and no distress  HEENT: alopecia, MMM, PERRLA.   NECK:supple.   RESPIRATORY: lungs clear to auscultation - no rales, rhonchi or wheezes, no use of accessory muscles, no retractions, respirations are unlabored, normal respiratory rate  CARDIOVASCULAR: +tachy, normal S1 with physiologic split S2, no S3 or S4 and no murmur, click or rub, precordium quiet with normal PMI.  ABDOMEN: soft, non tender,bowel sounds normal  EXTREMITIES: peripheral pulses normal, trace pedal edema, LUE wound with dressing CDI.  NEURO: alert and oriented to person/place/time, normal speech, gait and affect  SKIN: pale, fragile, no rashes    Labs:  Reviewed.     Testing/Procedures:  9/11/17 ECHOCARDIOGRAM:   Interpretation Summary  Left ventricular function, chamber size, wall motion, and wall thickness are  normal.The EF is 60-65% (traced 60%.) GLS -18%.  The right ventricle is normal size and normal in function. The RV is mildly  dilated.  Moderate tricuspid insufficiency is present.  Mild pulmonary hypertension is present (esimated PASP 55 mmHg including RA  pressure.)  Dilation of the inferior vena cava is present with abnormal respiratory  variation in diameter " (esitimated RAP 15 mmHg.)  This study was compared with the study from 8/31/17: TR appears slightly  decreased from the earlier study.    7/24/17 CMRI:     1. The LV is normal in cavity size and wall thickness. The global systolic function is normal. The LVEF is  64%. There are no regional wall motion abnormalities. No evidence of myocardial iron overload.      2. The RV is normal in cavity size. The global systolic function is normal. The RVEF is 59%.      3. There is moderate dilation of bilateral atria.      4. Tricuspid regurgitation is present, difficult to quantify due to image quality.      5. Late gadolinium enhancement imaging shows RV insertion site hyperenhancement, a non-specific finding  seen in patients with RV volume/pressure overload.      6. There is a moderate right pleural effusion and small left pleural effusion.      CONCLUSIONS: Normal Bi-Ventricular systolic function, LVEF 64% and RVE 59%. There is no evidence of  infiltrative disease. Compared to the MRI from 5/15/2017, there is no significant change.     7/2017 BRE:  Interpretation Summary  There is a small mass (0.7 cm by 0.2 cm) on the ventricular side of the right  coronary cusp. There is a second mass (0.4 cm by 0.2 cm) observed in the LVOT,  attached to the mitro-aortic curtain. There is a third mass on the noncoronary  cusp that measures 0.4 cm by 0.2 cm that could be a healing vegetation. These  findings are compatible with endocarditis if clinical picture supports.  Right ventricular function, chamber size, wall motion, and thickness are  normal.  This study was compared with the study from 7/10/2017.  There is detection of masses on the aortic valve and LVOT.      Assessment and Plan:   Ms. Michel is a 56 year old female who has a past medical history significant for VSD s/p repair 1969, RA, HTN, insomnia, atrial tachyarrhythmias, cardiac arrest, and newly diagnosed DLBCL.   She initially presented in 5/2017 with symptoamtic atrial  tachyarrhythmias (SVT, AF, AFL). She underwent DCCV with early recurrence and was started on AAT. She was thought to not tolerate Sotalol and was switched to amiodarone. However, she continued to have fatigue, nausea, and general malaise despite being in sinus. She then suffered out of hospital cardiac arrest and was found in VF by EMS and defibrillated in field. She had a prolonged hospital course c/b anasarca, CELSA, shock liver, fevers, cytopenias and was ultimately found to have DLBCL and started on treatment. She presents today for follow up. She reports feeling well. She continues to have LUE wound and will be meeting with Plastics. She has finished her chemo cycles with repeat imaging demonstrating no evidence of active lymphoma.   Since her Digoxin was stopped on her last visit (in view of its interaction with the recently started Plaquenil), she has had to be cardioverted three times over the last month for symptomatic AFib. Today her EKG demonstrated AFl again with rate in the 140's. She says her HR was controlled until today, but for the last 6 days she has felt like she is in AFib/AFl.     Atrial Fibrillation:  We discussed in detail with the patient management/treatment options for Krystyna includin. Stroke Prophylaxis:  CHADSVASC=+HTN, +gender  2, corresponding to a 2.2% annual stroke / systemic emolism event rate. indicating need for long term oral anticoagulation.  She is now able to be anticoagulated per her Oncology team. We will continue Eliquis 5 mg BID.   2. Rate Control: Continue Atenolol and resume Digoxin. Since holding Digoxin, she has had several symptomatic episodes of AF/AFl, and over the last one month has had to be cardioverted on three occasions. We will start by loading with Dig 250 for two days, followed by 125 daily. Given the fact that she is on Plaquenil, we will plan to check Dig level next week.   3. Rhythm Control: Cardioversion, Antiarrhythmics and/or ablation are options for  rhythm control. We will plan for DCCV next week, while on Digoxin. She has been on anticoagulation with Eliquis (no interruptions), therefore will not need a BRE.   Notably, she has possible side effects from Sotalol (however, likely was due to underlying illness at the time and not from medication). Was on amiodarone with continued episodes and stopped. If she continues to have frequent symptomatic episodes of AF/AFl, given her history of adverse reactions to AAT, she may be a candidate for ablation.   She has an MRI scheduled this Friday, we will postpone it to until after the DCCV next week.     4. Risk Factor Management: maintain tight BP control, and PB evaluation as indicated.         She will follow up with heme/onc within a month to assess for any recurrence, given the increased frequency of her arrhythmias.  She will follow up with Dr. Eaton in one month.     The patient states understanding and is agreeable with plan.   This patient was seen and evaluated with Dr. Eaton. The above note reflects our joint assessment and plan.     eNw Haas MD  Cardiovascular Disease Fellow  Pager: [phone]    EP STAFF NOTE  Patient seen and examined and management plan personally reviewed with the patient. I agree with the note above by the CV/EP fellow.  We will get her back on digoxin since she was fairly calm when she was taking it, and will monitor levels. Will will follow up closely in a month or so. Meanwhile, she would have seen her primary oncologist to see whether there is any recurrence. In case there isn't and she still has recurrences of afib, we will pursue ablation after discussion with the patient as she understandably does not want to be on AAT.  Juan Eaton MD [facility]  Cardiology - Electrophysiology

## 2018-04-05 LAB
ANION GAP SERPL CALCULATED.3IONS-SCNC: 8 MMOL/L (ref 3–14)
BUN SERPL-MCNC: 24 MG/DL (ref 7–30)
CALCIUM SERPL-MCNC: 9.3 MG/DL (ref 8.5–10.1)
CHLORIDE SERPL-SCNC: 101 MMOL/L (ref 94–109)
CO2 SERPL-SCNC: 26 MMOL/L (ref 20–32)
CREAT SERPL-MCNC: 0.9 MG/DL (ref 0.52–1.04)
GFR SERPL CREATININE-BSD FRML MDRD: 65 ML/MIN/1.7M2
GLUCOSE SERPL-MCNC: 108 MG/DL (ref 70–99)
POTASSIUM SERPL-SCNC: 4.2 MMOL/L (ref 3.4–5.3)
SODIUM SERPL-SCNC: 135 MMOL/L (ref 133–144)

## 2018-04-05 PROCEDURE — 80048 BASIC METABOLIC PNL TOTAL CA: CPT | Performed by: PHYSICIAN ASSISTANT

## 2018-04-06 LAB — INTERPRETATION ECG - MUSE: NORMAL

## 2018-04-09 ENCOUNTER — TELEPHONE (OUTPATIENT)
Dept: ORTHOPEDICS | Facility: CLINIC | Age: 58
End: 2018-04-09

## 2018-04-09 NOTE — TELEPHONE ENCOUNTER
Amelia calls today to inquire about getting scheduled for surgery.  She is being treated by Dr. Sheldon for a left medial elbow wound.   Amelia will be having a cardioversion on Thursday 4/12/18. She would like to have surgery shortly after this. She states the cardiologist will clear her as long as her heart rate is stable and she is not tachycardic.   Message sent to Dr. Sheldon. We would have get a pre-operative history and physical from her PCP prior to the surgery.

## 2018-04-10 ENCOUNTER — HOSPITAL ENCOUNTER (OUTPATIENT)
Dept: PHYSICAL THERAPY | Facility: CLINIC | Age: 58
Setting detail: THERAPIES SERIES
End: 2018-04-10
Attending: INTERNAL MEDICINE
Payer: COMMERCIAL

## 2018-04-10 DIAGNOSIS — I50.33 ACUTE ON CHRONIC DIASTOLIC HEART FAILURE (H): ICD-10-CM

## 2018-04-10 PROCEDURE — 40000360 ZZHC STATISTIC PT CANCER REHAB VISIT: Performed by: PHYSICAL THERAPIST

## 2018-04-10 PROCEDURE — 97140 MANUAL THERAPY 1/> REGIONS: CPT | Mod: GP | Performed by: PHYSICAL THERAPIST

## 2018-04-10 PROCEDURE — 97110 THERAPEUTIC EXERCISES: CPT | Mod: GP | Performed by: PHYSICAL THERAPIST

## 2018-04-10 RX ORDER — SPIRONOLACTONE 25 MG/1
25 TABLET ORAL DAILY
Qty: 30 TABLET | Refills: 3 | Status: SHIPPED | OUTPATIENT
Start: 2018-04-10 | End: 2018-08-08

## 2018-04-10 NOTE — PROGRESS NOTES
Outpatient Physical Therapy Progress Note     Patient: Amelia Michel  : 1960    Beginning/End Dates of Reporting Period:  2/10/18 to 4/10/2018    Referring Provider: Dr. Lenore Rodriguez    Therapy Diagnosis: B UE/LE weakness, shoulder, neck pain, aerobic deconditioning     Client Self Report: Pt had to cancel last visit due to a-fib. Started digitalis last Th and has been feeling fine. HR stays in  range. Cardioversion planned for  and MRI to check for pericarditis. L UE wound: surgery to be planned.     Objective Measurements:  Objective Measure: 6  MW  Details: 1020 feet        Objective Measure: shoulder pain  Details: better, but still sometimes intermittent soreness  Objective Measure: neck pain  Details: no pain.      Outcome Measures (most recent score): 6 minute walk test: 311 meters          Goals:  Goal Identifier 6 min walk test   Goal Description increase 6 min walk test by 70 meters to 350 meters to increase ability to do errands without fatigue   Target Date 18   Date Met      Progress:progress but not yet 70 meters more     Goal Identifier driving   Goal Description Pt will have ankle ROM and strength to be able to drive for 30 minutes   Target Date 18   Date Met   4/10/18   Progress:     Goal Identifier home program   Goal Description Pt will be independent in home program of strengthing, ROM and aerobic conditioning and know how to advance it w/o increasing fatigue.   Target Date 18   Date Met      Progress:on -going, good progress       Progress Toward Goals:   Progress this reporting period: Bree has made good progress, with an increase in strength and aerobic conditioning and a decrease in pain, in spite of many medical obstacles.      Plan:  Continue therapy per current plan of care.    Discharge:  No

## 2018-04-12 ENCOUNTER — HOSPITAL ENCOUNTER (OUTPATIENT)
Facility: CLINIC | Age: 58
Discharge: HOME | End: 2018-04-12
Attending: INTERNAL MEDICINE | Admitting: INTERNAL MEDICINE
Payer: COMMERCIAL

## 2018-04-12 ENCOUNTER — ANESTHESIA EVENT (OUTPATIENT)
Dept: SURGERY | Facility: CLINIC | Age: 58
End: 2018-04-12
Payer: COMMERCIAL

## 2018-04-12 ENCOUNTER — ANESTHESIA (OUTPATIENT)
Dept: SURGERY | Facility: CLINIC | Age: 58
End: 2018-04-12
Payer: COMMERCIAL

## 2018-04-12 ENCOUNTER — HOSPITAL ENCOUNTER (OUTPATIENT)
Dept: MRI IMAGING | Facility: CLINIC | Age: 58
End: 2018-04-12
Attending: INTERNAL MEDICINE | Admitting: INTERNAL MEDICINE
Payer: COMMERCIAL

## 2018-04-12 ENCOUNTER — APPOINTMENT (OUTPATIENT)
Dept: LAB | Facility: CLINIC | Age: 58
End: 2018-04-12
Attending: INTERNAL MEDICINE
Payer: COMMERCIAL

## 2018-04-12 ENCOUNTER — HOSPITAL ENCOUNTER (OUTPATIENT)
Dept: CARDIOLOGY | Facility: CLINIC | Age: 58
End: 2018-04-12
Attending: INTERNAL MEDICINE | Admitting: INTERNAL MEDICINE
Payer: COMMERCIAL

## 2018-04-12 ENCOUNTER — APPOINTMENT (OUTPATIENT)
Dept: MEDSURG UNIT | Facility: CLINIC | Age: 58
End: 2018-04-12
Attending: INTERNAL MEDICINE
Payer: COMMERCIAL

## 2018-04-12 VITALS
TEMPERATURE: 97.8 F | OXYGEN SATURATION: 97 % | DIASTOLIC BLOOD PRESSURE: 66 MMHG | RESPIRATION RATE: 16 BRPM | SYSTOLIC BLOOD PRESSURE: 105 MMHG | HEART RATE: 58 BPM

## 2018-04-12 VITALS
HEART RATE: 57 BPM | SYSTOLIC BLOOD PRESSURE: 93 MMHG | RESPIRATION RATE: 16 BRPM | DIASTOLIC BLOOD PRESSURE: 69 MMHG | OXYGEN SATURATION: 98 %

## 2018-04-12 DIAGNOSIS — I48.91 ATRIAL FIBRILLATION (H): ICD-10-CM

## 2018-04-12 DIAGNOSIS — Z86.74 H/O CARDIAC ARREST: ICD-10-CM

## 2018-04-12 DIAGNOSIS — I48.92 ATRIAL FLUTTER, UNSPECIFIED TYPE (H): ICD-10-CM

## 2018-04-12 DIAGNOSIS — I48.0 PAROXYSMAL ATRIAL FIBRILLATION (H): ICD-10-CM

## 2018-04-12 LAB
DIGOXIN SERPL-MCNC: 0.6 UG/L (ref 0.5–2)
MAGNESIUM SERPL-MCNC: 1.9 MG/DL (ref 1.6–2.3)
POTASSIUM SERPL-SCNC: 3.7 MMOL/L (ref 3.4–5.3)

## 2018-04-12 PROCEDURE — 40000166 ZZH STATISTIC PP CARE STAGE 1

## 2018-04-12 PROCEDURE — 93005 ELECTROCARDIOGRAM TRACING: CPT

## 2018-04-12 PROCEDURE — 25000128 H RX IP 250 OP 636: Performed by: NURSE PRACTITIONER

## 2018-04-12 PROCEDURE — 37000008 ZZH ANESTHESIA TECHNICAL FEE, 1ST 30 MIN

## 2018-04-12 PROCEDURE — 92960 CARDIOVERSION ELECTRIC EXT: CPT | Performed by: NURSE PRACTITIONER

## 2018-04-12 PROCEDURE — 83735 ASSAY OF MAGNESIUM: CPT | Performed by: INTERNAL MEDICINE

## 2018-04-12 PROCEDURE — 25000125 ZZHC RX 250: Performed by: NURSE ANESTHETIST, CERTIFIED REGISTERED

## 2018-04-12 PROCEDURE — 75561 CARDIAC MRI FOR MORPH W/DYE: CPT

## 2018-04-12 PROCEDURE — A9585 GADOBUTROL INJECTION: HCPCS | Performed by: INTERNAL MEDICINE

## 2018-04-12 PROCEDURE — 93010 ELECTROCARDIOGRAM REPORT: CPT | Performed by: INTERNAL MEDICINE

## 2018-04-12 PROCEDURE — 92960 CARDIOVERSION ELECTRIC EXT: CPT

## 2018-04-12 PROCEDURE — 25000128 H RX IP 250 OP 636: Performed by: NURSE ANESTHETIST, CERTIFIED REGISTERED

## 2018-04-12 PROCEDURE — 75561 CARDIAC MRI FOR MORPH W/DYE: CPT | Mod: 26 | Performed by: INTERNAL MEDICINE

## 2018-04-12 PROCEDURE — 25000128 H RX IP 250 OP 636: Performed by: INTERNAL MEDICINE

## 2018-04-12 PROCEDURE — 40000065 ZZH STATISTIC EKG NON-CHARGEABLE

## 2018-04-12 PROCEDURE — 80162 ASSAY OF DIGOXIN TOTAL: CPT | Performed by: INTERNAL MEDICINE

## 2018-04-12 PROCEDURE — 84132 ASSAY OF SERUM POTASSIUM: CPT | Performed by: INTERNAL MEDICINE

## 2018-04-12 RX ORDER — POTASSIUM CHLORIDE 750 MG/1
40 TABLET, EXTENDED RELEASE ORAL
Status: DISCONTINUED | OUTPATIENT
Start: 2018-04-12 | End: 2018-04-12 | Stop reason: HOSPADM

## 2018-04-12 RX ORDER — ONDANSETRON 2 MG/ML
4 INJECTION INTRAMUSCULAR; INTRAVENOUS EVERY 6 HOURS PRN
Status: DISCONTINUED | OUTPATIENT
Start: 2018-04-12 | End: 2018-04-12 | Stop reason: HOSPADM

## 2018-04-12 RX ORDER — LIDOCAINE 40 MG/G
CREAM TOPICAL
Status: DISCONTINUED | OUTPATIENT
Start: 2018-04-12 | End: 2018-04-12 | Stop reason: HOSPADM

## 2018-04-12 RX ORDER — SODIUM CHLORIDE 9 MG/ML
INJECTION, SOLUTION INTRAVENOUS CONTINUOUS PRN
Status: DISCONTINUED | OUTPATIENT
Start: 2018-04-12 | End: 2018-04-12

## 2018-04-12 RX ORDER — POTASSIUM CHLORIDE 750 MG/1
20 TABLET, EXTENDED RELEASE ORAL
Status: DISCONTINUED | OUTPATIENT
Start: 2018-04-12 | End: 2018-04-12 | Stop reason: HOSPADM

## 2018-04-12 RX ORDER — GADOBUTROL 604.72 MG/ML
7.5 INJECTION INTRAVENOUS ONCE
Status: COMPLETED | OUTPATIENT
Start: 2018-04-12 | End: 2018-04-12

## 2018-04-12 RX ADMIN — SODIUM CHLORIDE: 9 INJECTION, SOLUTION INTRAVENOUS at 09:35

## 2018-04-12 RX ADMIN — ONDANSETRON 4 MG: 2 INJECTION INTRAMUSCULAR; INTRAVENOUS at 09:35

## 2018-04-12 RX ADMIN — OXALIPLATIN 30 MG: 5 INJECTION, SOLUTION, CONCENTRATE INTRAVENOUS at 09:37

## 2018-04-12 RX ADMIN — GADOBUTROL 7.5 ML: 604.72 INJECTION INTRAVENOUS at 12:04

## 2018-04-12 RX ADMIN — OXALIPLATIN 10 MG: 5 INJECTION, SOLUTION, CONCENTRATE INTRAVENOUS at 09:38

## 2018-04-12 ASSESSMENT — ENCOUNTER SYMPTOMS: DYSRHYTHMIAS: 1

## 2018-04-12 NOTE — ANESTHESIA PREPROCEDURE EVALUATION
Anesthesia Evaluation     . Pt has had prior anesthetic. Type: General    History of anesthetic complications   - PONV        ROS/MED HX    ENT/Pulmonary:  - neg pulmonary ROS     Neurologic:  - neg neurologic ROS     Cardiovascular:     (+) ----. : . CHF . . :. dysrhythmias a-fib, valvular problems/murmurs s/p VSD :.       METS/Exercise Tolerance:     Hematologic:     (+) Anemia, -      Musculoskeletal:  - neg musculoskeletal ROS       GI/Hepatic:  - neg GI/hepatic ROS   (+) hepatitis type Other,       Renal/Genitourinary:         Endo:  - neg endo ROS   (+) Chronic steroid usage for Date most recently used: chemo,.      Psychiatric:  - neg psychiatric ROS       Infectious Disease:  - neg infectious disease ROS       Malignancy:      - no malignancy   Other:                     Physical Exam  Normal systems: pulmonary and dental    Airway   Mallampati: II  TM distance: >3 FB  Neck ROM: full    Dental     Cardiovascular   Rhythm and rate: irregular and normal      Pulmonary                     Anesthesia Plan      History & Physical Review  History and physical reviewed and following examination; no interval change.    ASA Status:  3 .    NPO Status:  > 8 hours    Plan for General with Intravenous induction. Maintenance will be TIVA.    PONV prophylaxis:  Ondansetron (or other 5HT-3)       Postoperative Care  Postoperative pain management:  Multi-modal analgesia.      Consents  Anesthetic plan, risks, benefits and alternatives discussed with:  Patient..

## 2018-04-12 NOTE — ANESTHESIA CARE TRANSFER NOTE
Patient: Amelia Michel    Procedure(s):  Cardioversion    Diagnosis: Atrial Fibulation  Diagnosis Additional Information: No value filed.    Anesthesia Type:   No value filed.     Note:  Airway :Nasal Cannula  Patient transferred to:Phase II  Comments: Pt conversing with staff. Handoff given to echo RN. MERLYNS. Handoff Report: Identifed the Patient, Identified the Reponsible Provider, Reviewed the pertinent medical history, Discussed the surgical course, Reviewed Intra-OP anesthesia mangement and issues during anesthesia, Set expectations for post-procedure period and Allowed opportunity for questions and acknowledgement of understanding      Vitals: (Last set prior to Anesthesia Care Transfer)    CRNA VITALS  4/12/2018 0926 - 4/12/2018 0956      4/12/2018             NIBP: 111/80    Pulse: 55    SpO2: 99 %    Resp Rate (observed): 14    EKG: Sinus bradycardia                Electronically Signed By: ELIZABETH Owens CRNA  April 12, 2018  9:56 AM

## 2018-04-12 NOTE — PROGRESS NOTES
Reviewed discharge instructions again with patient. Voiced understanding. Chest pink from cardioversion and patches. EKG post procedure completed. Maria Esther Velasquez updated with information. Headed to MRI for next appointment.

## 2018-04-12 NOTE — PROGRESS NOTES
Pt arrived to 2A with  for cardioversion. VSS, pt denies pain. IV inserted, labs collected. Awaiting consent. Continue to monitor

## 2018-04-12 NOTE — OP NOTE
CARDIOVERSION    PROCEDURE:  direct current cardioversion  PROCEDURE DATE: 4/12/2018     Pre-procedure diagnosis:  Atrial fibrillation  Post-procedure diagnosis: s/p direct current cardioversion  Complications:  none    BRIEF CLINICAL HISTORY:  Ms. Michel is a 56 year old female who has a past medical history significant for VSD s/p repair 1969, RA, HTN, insomnia, atrial tachyarrhythmias, cardiac arrest, and DLBCL. She has had recurrent AF with multiple DCCVs. She is back in AF and presents today for DCCV. She is taking Eliquis uninterrupted for >1 mo.      PROCEDURE:  The patient arrived at the Echo Laboratory in a fasting, non-sedated state.  Informed consent was previously obtained from patient, who understood indications, risks, and benefits of the procedure.  With help from Anesthesia, patient was deeply sedated.  150J of synchronized biphasic shock was delivered through the cardiac monitor, and the patient was successfully converted to normal sinus rhythm.  After sedation wore off, patient awoke and remained neurologically intact.  The patient tolerated the procedure well from a hemodynamic and respiratory standpoint without any complications.  She was observed in the Echo Laboratory and then discharged to home after sedation wore off and she was ambulatory.    IMPRESSION:  1.  Successful direct current cardioversion with 150J biphasic shock.    RECOMMENDATIONS/PLANS:  1.   Follow-up with Dr. Eaton  2.   Continue anticoagulation    ELIZABETH Verde CNP  Electrophysiology Consult Service  Pager: [phone]

## 2018-04-12 NOTE — PROGRESS NOTES
Pt back from ECHO s/p cardioversion.  VSS.  Pt alert and oriented x4.  Pt skin pink where EKG and cardioversion pads placed.  Pt denies any pain.  Pt's family at the bedside.

## 2018-04-12 NOTE — PROGRESS NOTES
Pt here for cardioversion.  Consent signed, discharge instructions explained.  Pt given 4 mg IV zofran prior to anesthetic.  Anesthesia present for sedation, 40 mg brevitol given per them.  Pt shocked x 1 at 150 J into SB.  Post EKG to be done on 2A.  Pt returned to 2A on litter for post sedation monitoring following cardioversion.  Report given to 2A RN.

## 2018-04-13 LAB
INTERPRETATION ECG - MUSE: NORMAL

## 2018-04-16 ENCOUNTER — TELEPHONE (OUTPATIENT)
Dept: ORTHOPEDICS | Facility: CLINIC | Age: 58
End: 2018-04-16

## 2018-04-16 NOTE — TELEPHONE ENCOUNTER
Called bree back today to discuss scheduling surgery.  Bree recently had a cardioversion, Dr. Sheldon would like to wait several weeks to do an elective surgical procedure (wound closure). He would like clearance from both the cardiologist and her PCP before proceeding. We will contact Bree after her appointment on 4/24/18.   She verbalized understanding.

## 2018-04-18 NOTE — PROGRESS NOTES
Medtronic Adapta L ADDRL1 Pacemaker Device Check  AP: 94.9 % : 95 %  Mode: DDDR  bpm        Underlying Rhythm: SR in 40's with CHB no junctional escape  Heart Rate: Stable with good variability.  Sensing: WNL    Pacing Threshold: WNL   Impedance: WNL  Battery Status: Approximately 8 years longevity.  Device Site: Well healed  Atrial Arrhythmia: 1 mode switch, no stored EGMs, lasted less than 1 minute.  Ventricular Arrhythmia: 0  Setting Change: Capture Management turned to adaptive.    Care Plan: Follow up with Q 3 month remote checks. LINWOOD Riggs RN                Social Work Services Progress Note    Hospital Day: 38  Date of Initial Social Work Evaluation:  6/20/17  Collaborated with:  Pt and Spouse    Data:  Pt is a 56 year old female being followed by FRANCO for needs post new diagnosis of lymphoma.  SW consulted for financial paperwork needs.     Intervention:  SW met with pt and spouse today to address consult for paperwork.  Spouse Romeo had a form from [facility] asking for information about an encounter billed for Dr. Hua.  Romeo wanted to verify this physician had performed the service listed on the request.  FRANCO and RNCC were able to find a MRI note with the provider's information.  This note was given to Romeo to assist with documentation requested by preferred one. SW will clear consult orders from chart.    Pt also reported that the heme/onc team had arrived yesterday at 7:45 pm to discuss treatment plans.  Pt wanted them to come back today with Romeo present.  Pt and spouse requesting to meet with providers at either 12 pm or 1 pm to discuss options for care.  FRANCO paged Heme/Onc PA with this request.    Assessment:  No changes in assessment for today.    Plan:    Anticipated Disposition:  Facility:  Return to [facility] as [facility].    Barriers to d/c plan:  Medical stability, heme/onc to give recommendations on course of care.  Heme/onc will meet with pt today to provide options and length of treatment.  Pending the option chosen, [facility]CO will update [facility] admissions.    Follow Up:  FRANCO follow for support of pt and spouse and discharge planning pending choice of treatment.    DENNIS Larsen, APSW  6C Unit   Phone: [phone]  Pager: [phone]  Unit: [phone]      ___________________________________________________________________________________________________________________________________________________    Referrals in process:   [facility] - return when medically stable.    Consider TCU pending pt's chemotherapy needs.    Referrals  Discontinued:  None    Community Case Management/Community Services in place:   None

## 2018-04-23 ENCOUNTER — CARE COORDINATION (OUTPATIENT)
Dept: CARDIOLOGY | Facility: CLINIC | Age: 58
End: 2018-04-23

## 2018-04-23 DIAGNOSIS — I50.32 CHRONIC DIASTOLIC HEART FAILURE (H): Primary | ICD-10-CM

## 2018-04-24 ENCOUNTER — HOSPITAL ENCOUNTER (OUTPATIENT)
Dept: PHYSICAL THERAPY | Facility: CLINIC | Age: 58
Setting detail: THERAPIES SERIES
End: 2018-04-24
Attending: INTERNAL MEDICINE
Payer: COMMERCIAL

## 2018-04-24 ENCOUNTER — OFFICE VISIT (OUTPATIENT)
Dept: CARDIOLOGY | Facility: CLINIC | Age: 58
End: 2018-04-24
Attending: NURSE PRACTITIONER
Payer: COMMERCIAL

## 2018-04-24 VITALS
SYSTOLIC BLOOD PRESSURE: 124 MMHG | DIASTOLIC BLOOD PRESSURE: 78 MMHG | OXYGEN SATURATION: 98 % | HEART RATE: 65 BPM | WEIGHT: 115.1 LBS | HEIGHT: 58 IN | BODY MASS INDEX: 24.16 KG/M2

## 2018-04-24 DIAGNOSIS — I50.32 CHRONIC DIASTOLIC HEART FAILURE (H): ICD-10-CM

## 2018-04-24 DIAGNOSIS — I50.32 CHRONIC DIASTOLIC HEART FAILURE (H): Primary | ICD-10-CM

## 2018-04-24 LAB
ANION GAP SERPL CALCULATED.3IONS-SCNC: 10 MMOL/L (ref 3–14)
BUN SERPL-MCNC: 26 MG/DL (ref 7–30)
CALCIUM SERPL-MCNC: 9.3 MG/DL (ref 8.5–10.1)
CHLORIDE SERPL-SCNC: 102 MMOL/L (ref 94–109)
CO2 SERPL-SCNC: 23 MMOL/L (ref 20–32)
CREAT SERPL-MCNC: 0.8 MG/DL (ref 0.52–1.04)
GFR SERPL CREATININE-BSD FRML MDRD: 74 ML/MIN/1.7M2
GLUCOSE SERPL-MCNC: 165 MG/DL (ref 70–99)
POTASSIUM SERPL-SCNC: 4.4 MMOL/L (ref 3.4–5.3)
SODIUM SERPL-SCNC: 135 MMOL/L (ref 133–144)

## 2018-04-24 PROCEDURE — 97110 THERAPEUTIC EXERCISES: CPT | Mod: GP | Performed by: PHYSICAL THERAPIST

## 2018-04-24 PROCEDURE — 36415 COLL VENOUS BLD VENIPUNCTURE: CPT | Performed by: NURSE PRACTITIONER

## 2018-04-24 PROCEDURE — 97535 SELF CARE MNGMENT TRAINING: CPT | Mod: GP | Performed by: PHYSICAL THERAPIST

## 2018-04-24 PROCEDURE — G0463 HOSPITAL OUTPT CLINIC VISIT: HCPCS | Mod: ZF

## 2018-04-24 PROCEDURE — 80048 BASIC METABOLIC PNL TOTAL CA: CPT | Performed by: NURSE PRACTITIONER

## 2018-04-24 PROCEDURE — 99214 OFFICE O/P EST MOD 30 MIN: CPT | Mod: ZP | Performed by: NURSE PRACTITIONER

## 2018-04-24 PROCEDURE — 40000360 ZZHC STATISTIC PT CANCER REHAB VISIT: Performed by: PHYSICAL THERAPIST

## 2018-04-24 ASSESSMENT — ENCOUNTER SYMPTOMS
ORTHOPNEA: 0
SLEEP DISTURBANCES DUE TO BREATHING: 0
PARALYSIS: 0
DECREASED CONCENTRATION: 1
DOUBLE VISION: 0
DISTURBANCES IN COORDINATION: 1
DECREASED APPETITE: 0
MUSCLE WEAKNESS: 1
WEIGHT LOSS: 0
NERVOUS/ANXIOUS: 1
FEVER: 0
INCREASED ENERGY: 1
EXERCISE INTOLERANCE: 1
LOSS OF CONSCIOUSNESS: 0
BACK PAIN: 0
SYNCOPE: 0
SEIZURES: 0
DIZZINESS: 1
INSOMNIA: 1
WEIGHT GAIN: 0
POLYPHAGIA: 0
EYE IRRITATION: 0
HEADACHES: 0
MYALGIAS: 0
SPEECH CHANGE: 0
POLYDIPSIA: 0
LIGHT-HEADEDNESS: 0
MUSCLE CRAMPS: 1
JOINT SWELLING: 0
TINGLING: 1
CHILLS: 0
EYE REDNESS: 0
HYPOTENSION: 0
DEPRESSION: 0
TREMORS: 0
DIFFICULTY URINATING: 0
FLANK PAIN: 0
STIFFNESS: 1
MEMORY LOSS: 1
PANIC: 0
ARTHRALGIAS: 1
NUMBNESS: 1
ALTERED TEMPERATURE REGULATION: 0
NIGHT SWEATS: 0
EYE PAIN: 0
WEAKNESS: 1
NECK PAIN: 0
HEMATURIA: 0
DYSURIA: 0
HYPERTENSION: 0
FATIGUE: 1
HALLUCINATIONS: 0
PALPITATIONS: 1

## 2018-04-24 ASSESSMENT — 6 MINUTE WALK TEST (6MWT): TOTAL DISTANCE WALKED (METERS): 390

## 2018-04-24 ASSESSMENT — PAIN SCALES - GENERAL: PAINLEVEL: NO PAIN (0)

## 2018-04-24 NOTE — PATIENT INSTRUCTIONS
"You were seen today in the Cardiovascular Clinic at the [facility].     Cardiology Providers you saw during your visit: Xena JOHNSON, CNP    1. No medication changes today, Continue lasix 20 mg daily.   2.  We will touch base with Dr. Eaton re: your MRI and contact you whether you should proceed with elbow surgery.   3.  Please call with any questions or concerns. [phone] ABBEY Case  4.  Follow up with Dr. Eaton on  as scheduled.     Results for FIDELIA MIDDLETON (MRN [id number]) as of 2018 14:06   Ref. Range 2018 13:32   Sodium Latest Ref Range: 133 - 144 mmol/L 135   Potassium Latest Ref Range: 3.4 - 5.3 mmol/L 4.4   Chloride Latest Ref Range: 94 - 109 mmol/L 102   Carbon Dioxide Latest Ref Range: 20 - 32 mmol/L 23   Urea Nitrogen Latest Ref Range: 7 - 30 mg/dL 26   Creatinine Latest Ref Range: 0.52 - 1.04 mg/dL 0.80   GFR Estimate Latest Ref Range: >60 mL/min/1.7m2 74   GFR Estimate If Black Latest Ref Range: >60 mL/min/1.7m2 89   Calcium Latest Ref Range: 8.5 - 10.1 mg/dL 9.3   Anion Gap Latest Ref Range: 3 - 14 mmol/L 10   Glucose Latest Ref Range: 70 - 99 mg/dL 165 (H)         Please limit your fluid intake to 2 L (64 ounces) daily.  2 Liters a day = 8.5 cups, or 72 ounces.  Please limit your salt intake to 2 grams a day or less.    If you gain 2# in 24 hours or 5# in one week call Evie Pierce RN so we can adjust your medications as needed over the phone.    Please feel free to call me with any questions or concerns.      Evie Pierce RN BSN   [facility]  Cardiology Care Coordinator-Heart Failure Clinic    Questions and [phone].   First press #1 for the University and then press #3 for \"Medical Questions\" to reach us Cardiology Nurses.     On Call Cardiologist for after hours or on weekends: [phone]   option #4 and ask to speak to the on-call Cardiologist. Inform them you are a CORE/heart failure patient at the " [place].        If you need a medication refill please contact your pharmacy.  Please allow 3 business days for your refill to be completed.  _______________________________________________________  C.O.R.E. CLINIC Cardiomyopathy, Optimization, Rehabilitation, Education   The C.O.R.E. CLINIC is a heart failure specialty clinic within the [facility] where you will work with specialized nurse practitioners dedicated to helping patients with heart failure carefully adjust medications, receive education, and learn who and when to call if symptoms develop. They specialize in helping you better understand your condition, slow the progression of your disease, improve the length and quality of your life, help you detect future heart problems before they become life threatening, and avoid hospitalizations.  As always, thank you for trusting us with your health care needs!

## 2018-04-24 NOTE — PROGRESS NOTES
"Advanced Heart Failure Clinic -- CORE Follow Up  April 24, 2018    HPI:   Ms. Amelia Michel is a 57yr old female with a history of VSD (s/p repair 1969), HTN, longstanding RA (on Plaquenil and prednisone), atrial tachyarrhythmias (SVT, AF, AFL; s/p multiple DCCVs, last 4/12/18), past cardiac arrest, chronic diastolic heart failure, and DBLCL (in remission) who presents to clinic today for follow up.      She states that she feels well, \"best I've felt in a long time.\"  She completed her colchicine course (states that she was prescribed 1 month of treatment).  She notes continued concerns with her left arm wound, and would like to proceed with surgery but states that her plastic surgeon is reluctant given her atrial tachycardia.  She notes that her weight has been stable, and only rises if she is in AFib.  She eats well, and closely monitors her sodium and fluid intake.  Her BPs have ranged 120/60-70s, with dBPs in the 80s only if she is in AFib.  She notes low energy some days, which is her baseline.  She has not noticed any palpitations, but states that she often feels \"AFib in the back of my throat, temples and carotids.\"  She otherwise denies chest pain, palpitations, shortness of breath, dizziness, headaches, fevers, chills, nausea, vomiting, diarrhea, PND, orthopnea, and fluid retention.      PAST MEDICAL HISTORY:  Past Medical History:   Diagnosis Date     Atrial fibrillation (H)      Cancer (H)     lymphoma     Cardiac arrest (H)      Hypertension      Neuropathy      Rheumatoid arthritis(714.0)        FAMILY HISTORY:  Family History   Problem Relation Age of Onset     Breast Cancer Mother      Hypertension Mother      Alzheimer Disease Mother      Hypertension Father      Blood Disease Father      Lymphoa     Circulatory Father      A Fib     DIABETES Paternal Grandmother        SOCIAL HISTORY:  Social History     Social History     Marital status:      Spouse name: Romeo Michel     Number of " children: 0     Years of education: N/A     Occupational History      [employer]     Social History Main Topics     Smoking status: Never Smoker     Smokeless tobacco: Never Used     Alcohol use Yes      Comment: Glass of wine two evenings a night     Drug use: No     Sexual activity: Not Asked     Other Topics Concern     Parent/Sibling W/ Cabg, Mi Or Angioplasty Before 65f 55m? No     Social History Narrative       CURRENT MEDICATIONS:  Current Outpatient Prescriptions   Medication Sig Dispense Refill     acetaminophen (TYLENOL) 325 MG tablet Take 2 tablets (650 mg) by mouth every 4 hours as needed for mild pain, fever or headaches       apixaban ANTICOAGULANT (ELIQUIS) 5 MG tablet Take 1 tablet (5 mg) by mouth 2 times daily 180 tablet 3     ascorbic acid 500 MG TABS Take 1 tablet (500 mg) by mouth daily 30 tablet 0     atenolol (TENORMIN) 25 MG tablet Take 1 tablet (25 mg) by mouth 2 times daily 60 tablet 3     calcium polycarbophil (FIBERCON) 625 MG tablet Take 1 tablet by mouth 2 times daily       calcium-vitamin D (CALTRATE) 600-400 MG-UNIT per tablet Take 2 tablets by mouth every morning 60 tablet 0     digoxin (LANOXIN) 125 MCG tablet Take 250mcg daily for 2 days, then decrease to 125mcg daily 90 tablet 3     furosemide (LASIX) 20 MG tablet Take 1 tablet (20 mg) by mouth daily 90 tablet 3     gabapentin (NEURONTIN) 100 MG capsule TAKE TWO CAPSULES BY MOUTH THREE TIMES A  capsule 3     HYDROCORTISONE PO Take 100 mg by mouth IV       hydroxychloroquine (PLAQUENIL) 200 MG tablet Take 1 tablet (200 mg) by mouth 2 times daily 60 tablet 5     multivitamin, therapeutic with minerals (THERA-VIT-M) TABS tablet Take 1 tablet by mouth daily 30 each 0     order for DME Equipment being ordered: BP cuff 1 Device 1     predniSONE (DELTASONE) 5 MG tablet Take 1 tablet (5 mg) by mouth daily 90 tablet 2     spironolactone (ALDACTONE) 25 MG tablet Take 1 tablet (25 mg) by mouth daily 30 tablet 3  "    tolterodine (DETROL LA) 4 MG 24 hr capsule Take 4 mg by mouth daily          ROS:   Constitutional: No fever, chills, or sweats. Stable weight.   ENT: No visual disturbance, ear ache, epistaxis, sore throat.   Allergies/Immunologic: Negative.   Respiratory: As per HPI.  Cardiovascular: As per HPI.   GI: No nausea, vomiting, hematemesis, melena, or hematochezia.   : No urinary frequency, dysuria, or hematuria.   Integument: Negative.   Psychiatric: Negative.   Neuro: Negative.   Endocrinology: Negative.   Musculoskeletal: Negative.    EXAM:  /78 (BP Location: Right arm, Patient Position: Chair, Cuff Size: Adult Small)  Pulse 65  Ht 1.473 m (4' 10\")  Wt 52.2 kg (115 lb 1.6 oz)  SpO2 98%  BMI 24.06 kg/m2  General: appears comfortable, alert and articulate  Head: normocephalic, atraumatic  Eyes: anicteric sclera, EOMI  Neck: no adenopathy  Orophyarynx: moist mucosa, no lesions, dentition intact  Heart: regular, S1/S2, no murmur, gallop, rub, JVD negative when sitting upright  Lungs: clear, no rales or wheezing  Abdomen: soft, non-tender, bowel sounds present, no hepatosplenomegaly  Extremities: no clubbing, cyanosis or LE edema  Neurological: normal speech and affect, no gross motor deficits    Labs:  CBC RESULTS:  Lab Results   Component Value Date    WBC 3.4 (L) 03/23/2018    RBC 4.03 03/23/2018    HGB 15.2 03/23/2018    HCT 46.4 03/23/2018     (H) 03/23/2018    MCH 37.7 (H) 03/23/2018    MCHC 32.8 03/23/2018    RDW 13.9 03/23/2018    PLT 87 (L) 03/23/2018       CMP RESULTS:  Lab Results   Component Value Date     04/05/2018    POTASSIUM 3.7 04/12/2018    CHLORIDE 101 04/05/2018    CO2 26 04/05/2018    ANIONGAP 8 04/05/2018     (H) 04/05/2018    BUN 24 04/05/2018    CR 0.90 04/05/2018    GFRESTIMATED 65 04/05/2018    GFRESTBLACK 78 04/05/2018    VIKTORIYA 9.3 04/05/2018    BILITOTAL 0.7 03/23/2018    ALBUMIN 3.7 03/23/2018    ALKPHOS 136 03/23/2018    ALT 57 (H) 03/23/2018    AST 28 " 03/23/2018        INR RESULTS:  Lab Results   Component Value Date    INR 3.42 (H) 03/07/2018       Lab Results   Component Value Date    MAG 1.9 04/12/2018     Lab Results   Component Value Date    NTBNPI 2348 (H) 06/26/2017       Assessment and Plan:   Ms. Amelia Michel is a 57yr old female with a history of VSD (s/p repair 1969), HTN, longstanding RA (on Plaquenil and prednisone), atrial tachyarrhythmias (SVT, AF, AFL; s/p multiple DCCVs, last 4/12/18), past cardiac arrest, chronic diastolic heart failure, and DBLCL (in remission) who presents to clinic today for follow up.    Labs today show stable electrolytes and kidney function.  cMRI from 4/12 showed normal LV size and function, with LVEF 54%; mildly dilated RV size with normal RV function and RVEF 49%; moderate-severe TR; hyperenhancement consistent with RV pressure/volume overload; loculated exudative pericardial effusion that is beginning to organize with diffuse pericardial enhancement, c/w ongoing inflammation.    She has completed her prescribed course of colchicine for pericarditis (3/8-4/13).  Her cMRI shows continued signs of pericarditis, so will discuss further treatment with Dr. Eaton.    Overall, Ms. Michel continues to do well.  Her weight, volume status, and BP remain stable, so advised that she continue the remainder of her current medication regimen with no changes.    In re: to her left arm wound, discussed that her AFib should not limit her from undergoing surgery.  Discussed that she should see her PCP, as scheduled, for her pre-op physical, and that she should continue her atenolol, digoxin, and lasix prior to and after her surgery.  Her Eliquis may need to be held for 1-2 days, if indicated per PCP/surgeon's recommendations.    Chronic diastolic heart failure  Stage C  NYHA Class II  - volume status:  Euvolemic, on lasix 20mg daily  - BP:  Controlled on atenolol 25mg twice daily  - Aldosterone antagonist:  Spironolactone 25mg  daily    Atrial tachyarrhythmias  S/p multiple DCCVs.  Remains on BB, digoxin, and eliquis.  Her HR appeared regular and rate controlled today in clinic, however as her atria are large, she will likely not maintain NSR long-term.  Plan is to keep her rate-controlled as able.        The above was reviewed with Ms. Michel, who verbalized understanding and will call with further questions/concerns.    Xena Longo, DNP, APRN, FNP-BC  Advanced Heart Failure Nurse Practitioner  [facility]  [phone]        Patient Care Team:  Comfort Sabillon MD as PCP - General (Family Practice)  Lenore Rodriguez MD as MD (Internal Medicine)  Kay Miller, RN as Nurse Coordinator (Hematology & Oncology)  Archana Green PA-C as Physician Assistant (Physician Assistant)  Juan Eaton MD as MD (Clinical Cardiac Electrophysiology)  Stacie Delgado, RN as Registered Nurse (Clinical Cardiac Electrophysiology)  Shirley Eller, RN as Registered Nurse (General Surgery)  Keivn Sheldon MD as MD (Plastic Surgery)  COMFORT SABILLON

## 2018-04-24 NOTE — NURSING NOTE
Chief Complaint   Patient presents with     RECHECK      DCCV 3/23/18. Recent hospitalization early 3/2017 HF exacerbation, AF-requiring DCCV 3/12/18.     Vitals were taken and medications were reconciled.     Josiane Harris,ROSA  1:43 PM

## 2018-04-24 NOTE — MR AVS SNAPSHOT
After Visit Summary   2018    Fidelia Michel    MRN: [id number]           Patient Information     Date Of Birth          1960        Visit Information        Provider Department      2018 1:00 PM Xena Longo NP [facility]        Care Instructions    You were seen today in the Cardiovascular Clinic at the [facility].     Cardiology Providers you saw during your visit: Xena Longo APRN, CNP    1. No medication changes today, Continue lasix 20 mg daily.   2.  We will touch base with Dr. Eaton re: your MRI and contact you whether you should proceed with elbow surgery.   3.  Please call with any questions or concerns. [phone] ABBEY Case  4.  Follow up with Dr. Eaton on  as scheduled.     Results for FIDELIA MICHEL (MRN [id number]) as of 2018 14:06   Ref. Range 2018 13:32   Sodium Latest Ref Range: 133 - 144 mmol/L 135   Potassium Latest Ref Range: 3.4 - 5.3 mmol/L 4.4   Chloride Latest Ref Range: 94 - 109 mmol/L 102   Carbon Dioxide Latest Ref Range: 20 - 32 mmol/L 23   Urea Nitrogen Latest Ref Range: 7 - 30 mg/dL 26   Creatinine Latest Ref Range: 0.52 - 1.04 mg/dL 0.80   GFR Estimate Latest Ref Range: >60 mL/min/1.7m2 74   GFR Estimate If Black Latest Ref Range: >60 mL/min/1.7m2 89   Calcium Latest Ref Range: 8.5 - 10.1 mg/dL 9.3   Anion Gap Latest Ref Range: 3 - 14 mmol/L 10   Glucose Latest Ref Range: 70 - 99 mg/dL 165 (H)         Please limit your fluid intake to 2 L (64 ounces) daily.  2 Liters a day = 8.5 cups, or 72 ounces.  Please limit your salt intake to 2 grams a day or less.    If you gain 2# in 24 hours or 5# in one week call Evie Pierce RN so we can adjust your medications as needed over the phone.    Please feel free to call me with any questions or concerns.      Evie Pierce RN BSN   [facility]  Cardiology Care Coordinator-Heart Failure Clinic    Questions and [phone].   First  "press #1 for the University and then press #3 for \"Medical Questions\" to reach us Cardiology Nurses.     On Call Cardiologist for after hours or on weekends: [phone]   option #4 and ask to speak to the on-call Cardiologist. Inform them you are a CORE/heart failure patient at the [facility].        If you need a medication refill please contact your pharmacy.  Please allow 3 business days for your refill to be completed.  _______________________________________________________  C.O.R.E. CLINIC Cardiomyopathy, Optimization, Rehabilitation, Education   The C.O.R.E. CLINIC is a heart failure specialty clinic within the [facility] where you will work with specialized nurse practitioners dedicated to helping patients with heart failure carefully adjust medications, receive education, and learn who and when to call if symptoms develop. They specialize in helping you better understand your condition, slow the progression of your disease, improve the length and quality of your life, help you detect future heart problems before they become life threatening, and avoid hospitalizations.  As always, thank you for trusting us with your health care needs!                Follow-ups after your visit        Your next 10 appointments already scheduled     Apr 27, 2018 11:00 AM CDT   Pre-Op physical with Gala Stringer PA-C   [facility]    [address] [address]   [phone]            May 16, 2018 11:00 AM CDT   (Arrive by 10:45 AM)   RETURN ARRHYTHMIA with Juan Eaton MD   [facility] ([facility])    [address]  [address]  [facility] [address] [phone]            May 24, 2018 12:00 PM CDT   (Arrive by 11:45 AM)   CT CHEST ABDOMEN PELVIS W/O & W CONTRAST with UCCT1   [facility] ([facility])    [address]  1st [address]  [place] [address] [phone]           Please bring any scans or X-rays taken at other hospitals, if similar tests were done. Also bring a list of your medicines, including vitamins, minerals and over-the-counter drugs. It is safest to leave personal items at home.  Be sure to tell your doctor:   If you have any allergies.   If there s any chance you are pregnant.   If you are breastfeeding.  How to prepare:   Do not eat or drink for 2 hours before your exam. If you need to take medicine, you may take it with small sips of water. (We may ask you to take liquid medicine as well.)   Please wear loose clothing, such as a sweat suit or jogging clothes. Avoid snaps, zippers and other metal. We may ask you to undress and put on a hospital gown.  Please arrive 30 minutes early for your CT. Once in the department you might be asked to drink water 15-20 minutes prior to your exam.  If indicated you may be asked to drink an oral contrast in advance of your CT.  If this is the case, the imaging team will let you know or be in contact with you prior to your appointment  Patients over 70 or patients with diabetes or kidney problems:   If you haven t had a blood test (creatinine test) within the last 30 days, the Cardiologist/Radiologist may require you to get this test prior to your exam.  If you have diabetes:   Continue to take your metformin medication on the day of your exam  If you have any questions, please call the Imaging Department where you will have your exam.            May 24, 2018  3:00 PM CDT   [facility] Lab Draw with  [facility] LAB DRAW   [facility] Draw ([facility])    [address] [place] [address]  [address] [place] [id number] [phone]            May 24, 2018  3:30 PM CDT   RETURN ONC with Lenore Rodriguez MD   [facility] Blood and Marrow Transplant ([facility])    [address] [place] [address]  [address]  [facility] [id number] [phone]             May 31, 2018  4:30 PM CDT   (Arrive by 4:15 PM)   Return Visit with Nayeli Pritchett MD   [facility] Rheumatology ([facility])    [address]  [address]  [facility] [address]   [phone]            Jun 06, 2018  8:45 AM CDT   (Arrive by 8:30 AM)   Cystoscopy with Vadim Sparks MD   [facility] ([facility])    [address]  4th Floor  [facility] [address] [phone]            Jul 11, 2018 10:00 AM CDT   (Arrive by 9:45 AM)   RETURN ARRHYTHMIA with Juan Eaton MD   [facility] ([facility])    [address]  [address]  [facility] [address] [phone]              Who to contact     If you have questions or need follow up information about today's clinic visit or your schedule please contact [facility] directly at [phone].  Normal or non-critical lab and imaging results will be communicated to you by Edhubhart, letter or phone within 4 business days after the clinic has received the results. If you do not hear from us within 7 days, please contact the clinic through Upvertert or phone. If you have a critical or abnormal lab result, we will notify you by phone as soon as possible.  Submit refill requests through Cuponzote or call your pharmacy and they will forward the refill request to us. Please allow 3 business days for your refill to be completed.          Additional Information About Your Visit        Edhubhart Information     Cuponzote gives you secure access to your electronic health record. If you see a primary care provider, you can also send messages to your care team and make appointments. If you have questions, please call your primary care clinic.  If you do not have a primary care provider, please call [phone] and they will assist you.        Care EveryWhere ID     This is your Care EveryWhere ID. This  "could be used by other organizations to access your [facility] medical records  [id number]        Your Vitals Were     Pulse Height Pulse Oximetry BMI (Body Mass Index)          65 1.473 m (4' 10\") 98% 24.06 kg/m2         Blood Pressure from Last 3 Encounters:   04/24/18 124/78   04/12/18 105/66   04/12/18 93/69    Weight from Last 3 Encounters:   04/24/18 52.2 kg (115 lb 1.6 oz)   04/04/18 53.2 kg (117 lb 4.8 oz)   03/30/18 55.3 kg (121 lb 14.4 oz)              Today, you had the following     No orders found for display       Primary Care Provider Office Phone # Fax #    Comfort Sabillon [phone] [address] [facility] [facility] [address]        Equal Access to Services     [facility]: Hadii laurita mcguire hadasho Sogilbert, waaxda luqadaha, qaybta kaalmada jesicayada, durga hermosillo . So [facility] [phone].    ATENCIÓN: Si habla español, tiene a sarmiento disposición servicios gratuitos de asistencia lingüística. [facility] [phone].    We comply with applicable federal civil rights laws and [place] laws. We do not discriminate on the basis of race, color, national origin, age, disability, sex, sexual orientation, or gender identity.            Thank you!     Thank you for choosing [facility]  for your care. Our goal is always to provide you with excellent care. Hearing back from our patients is one way we can continue to improve our services. Please take a few minutes to complete the written survey that you may receive in the mail after your visit with us. Thank you!             Your Updated Medication List - Protect others around you: Learn how to safely use, store and throw away your medicines at www.disposemymeds.org.          This list is accurate as of 4/24/18  2:29 PM.  Always use your most recent med list.                   Brand Name Dispense Instructions for use Diagnosis    acetaminophen 325 MG tablet    TYLENOL     Take 2 tablets (650 mg) by mouth every 4 " hours as needed for mild pain, fever or headaches    Diffuse large B-cell lymphoma of extranodal site (H)       apixaban ANTICOAGULANT 5 MG tablet    ELIQUIS    180 tablet    Take 1 tablet (5 mg) by mouth 2 times daily    Paroxysmal atrial fibrillation (H)       ascorbic acid 500 MG Tabs     30 tablet    Take 1 tablet (500 mg) by mouth daily    Diffuse large B-cell lymphoma, unspecified body region (H)       atenolol 25 MG tablet    TENORMIN    60 tablet    Take 1 tablet (25 mg) by mouth 2 times daily    Atrial tachycardia (H)       calcium polycarbophil 625 MG tablet    FIBERCON     Take 1 tablet by mouth 2 times daily        calcium-vitamin D 600-400 MG-UNIT per tablet    CALTRATE    60 tablet    Take 2 tablets by mouth every morning    Diffuse large B-cell lymphoma, unspecified body region (H)       digoxin 125 MCG tablet    LANOXIN    90 tablet    Take 250mcg daily for 2 days, then decrease to 125mcg daily    Atrial flutter, unspecified type (H)       furosemide 20 MG tablet    LASIX    90 tablet    Take 1 tablet (20 mg) by mouth daily    Chronic systolic heart failure (H)       gabapentin 100 MG capsule    NEURONTIN    180 capsule    TAKE TWO CAPSULES BY MOUTH THREE TIMES A DAY    Critical illness myopathy       HYDROCORTISONE PO      Take 100 mg by mouth IV        hydroxychloroquine 200 MG tablet    PLAQUENIL    60 tablet    Take 1 tablet (200 mg) by mouth 2 times daily    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 (H)       multivitamin, therapeutic with minerals Tabs tablet     30 each    Take 1 tablet by mouth daily    Diffuse large B-cell lymphoma, unspecified body region (H)       order for DME     1 Device    Equipment being ordered: BP cuff    Hypertension, unspecified type       predniSONE 5 MG tablet    DELTASONE    90 tablet    Take 1 tablet (5 mg) by mouth daily    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 (H)       spironolactone 25 MG  tablet    ALDACTONE    30 tablet    Take 1 tablet (25 mg) by mouth daily    Acute on chronic diastolic heart failure (H)       tolterodine 4 MG 24 hr capsule    DETROL LA     Take 4 mg by mouth daily

## 2018-04-24 NOTE — LETTER
"4/24/2018      RE: Amelia Michel  [address]  [facility] [address]       Dear Colleague,    Thank you for the opportunity to participate in the care of your patient, Amelia Michel, at the [facility]. Please see a copy of my visit note below.    Advanced Heart Failure Clinic -- CORE Follow Up  April 24, 2018    HPI:   Ms. Amelia Michel is a 57yr old female with a history of VSD (s/p repair 1969), HTN, longstanding RA (on Plaquenil and prednisone), atrial tachyarrhythmias (SVT, AF, AFL; s/p multiple DCCVs, last 4/12/18), past cardiac arrest, chronic diastolic heart failure, and DBLCL (in remission) who presents to clinic today for follow up.      She states that she feels well, \"best I've felt in a long time.\"  She completed her colchicine course (states that she was prescribed 1 month of treatment).  She notes continued concerns with her left arm wound, and would like to proceed with surgery but states that her plastic surgeon is reluctant given her atrial tachycardia.  She notes that her weight has been stable, and only rises if she is in AFib.  She eats well, and closely monitors her sodium and fluid intake.  Her BPs have ranged 120/60-70s, with dBPs in the 80s only if she is in AFib.  She notes low energy some days, which is her baseline.  She has not noticed any palpitations, but states that she often feels \"AFib in the back of my throat, temples and carotids.\"  She otherwise denies chest pain, palpitations, shortness of breath, dizziness, headaches, fevers, chills, nausea, vomiting, diarrhea, PND, orthopnea, and fluid retention.      PAST MEDICAL HISTORY:  Past Medical History:   Diagnosis Date     Atrial fibrillation (H)      Cancer (H)     lymphoma     Cardiac arrest (H)      Hypertension      Neuropathy      Rheumatoid arthritis(714.0)        FAMILY HISTORY:  Family History   Problem Relation Age of Onset     Breast Cancer Mother      " Hypertension Mother      Alzheimer Disease Mother      Hypertension Father      Blood Disease Father      Lymphoa     Circulatory Father      A Fib     DIABETES Paternal Grandmother        SOCIAL HISTORY:  Social History     Social History     Marital status:      Spouse name: Romeo Michel     Number of children: 0     Years of education: N/A     Occupational History      [employer]     Social History Main Topics     Smoking status: Never Smoker     Smokeless tobacco: Never Used     Alcohol use Yes      Comment: Glass of wine two evenings a night     Drug use: No     Sexual activity: Not Asked     Other Topics Concern     Parent/Sibling W/ Cabg, Mi Or Angioplasty Before 65f 55m? No     Social History Narrative       CURRENT MEDICATIONS:  Current Outpatient Prescriptions   Medication Sig Dispense Refill     acetaminophen (TYLENOL) 325 MG tablet Take 2 tablets (650 mg) by mouth every 4 hours as needed for mild pain, fever or headaches       apixaban ANTICOAGULANT (ELIQUIS) 5 MG tablet Take 1 tablet (5 mg) by mouth 2 times daily 180 tablet 3     ascorbic acid 500 MG TABS Take 1 tablet (500 mg) by mouth daily 30 tablet 0     atenolol (TENORMIN) 25 MG tablet Take 1 tablet (25 mg) by mouth 2 times daily 60 tablet 3     calcium polycarbophil (FIBERCON) 625 MG tablet Take 1 tablet by mouth 2 times daily       calcium-vitamin D (CALTRATE) 600-400 MG-UNIT per tablet Take 2 tablets by mouth every morning 60 tablet 0     digoxin (LANOXIN) 125 MCG tablet Take 250mcg daily for 2 days, then decrease to 125mcg daily 90 tablet 3     furosemide (LASIX) 20 MG tablet Take 1 tablet (20 mg) by mouth daily 90 tablet 3     gabapentin (NEURONTIN) 100 MG capsule TAKE TWO CAPSULES BY MOUTH THREE TIMES A  capsule 3     HYDROCORTISONE PO Take 100 mg by mouth IV       hydroxychloroquine (PLAQUENIL) 200 MG tablet Take 1 tablet (200 mg) by mouth 2 times daily 60 tablet 5     multivitamin, therapeutic with  "minerals (THERA-VIT-M) TABS tablet Take 1 tablet by mouth daily 30 each 0     order for DME Equipment being ordered: BP cuff 1 Device 1     predniSONE (DELTASONE) 5 MG tablet Take 1 tablet (5 mg) by mouth daily 90 tablet 2     spironolactone (ALDACTONE) 25 MG tablet Take 1 tablet (25 mg) by mouth daily 30 tablet 3     tolterodine (DETROL LA) 4 MG 24 hr capsule Take 4 mg by mouth daily          ROS:   Constitutional: No fever, chills, or sweats. Stable weight.   ENT: No visual disturbance, ear ache, epistaxis, sore throat.   Allergies/Immunologic: Negative.   Respiratory: As per HPI.  Cardiovascular: As per HPI.   GI: No nausea, vomiting, hematemesis, melena, or hematochezia.   : No urinary frequency, dysuria, or hematuria.   Integument: Negative.   Psychiatric: Negative.   Neuro: Negative.   Endocrinology: Negative.   Musculoskeletal: Negative.    EXAM:  /78 (BP Location: Right arm, Patient Position: Chair, Cuff Size: Adult Small)  Pulse 65  Ht 1.473 m (4' 10\")  Wt 52.2 kg (115 lb 1.6 oz)  SpO2 98%  BMI 24.06 kg/m2  General: appears comfortable, alert and articulate  Head: normocephalic, atraumatic  Eyes: anicteric sclera, EOMI  Neck: no adenopathy  Orophyarynx: moist mucosa, no lesions, dentition intact  Heart: regular, S1/S2, no murmur, gallop, rub, JVD negative when sitting upright  Lungs: clear, no rales or wheezing  Abdomen: soft, non-tender, bowel sounds present, no hepatosplenomegaly  Extremities: no clubbing, cyanosis or LE edema  Neurological: normal speech and affect, no gross motor deficits    Labs:  CBC RESULTS:  Lab Results   Component Value Date    WBC 3.4 (L) 03/23/2018    RBC 4.03 03/23/2018    HGB 15.2 03/23/2018    HCT 46.4 03/23/2018     (H) 03/23/2018    MCH 37.7 (H) 03/23/2018    MCHC 32.8 03/23/2018    RDW 13.9 03/23/2018    PLT 87 (L) 03/23/2018       CMP RESULTS:  Lab Results   Component Value Date     04/05/2018    POTASSIUM 3.7 04/12/2018    CHLORIDE 101 04/05/2018 "    CO2 26 04/05/2018    ANIONGAP 8 04/05/2018     (H) 04/05/2018    BUN 24 04/05/2018    CR 0.90 04/05/2018    GFRESTIMATED 65 04/05/2018    GFRESTBLACK 78 04/05/2018    VIKTORIYA 9.3 04/05/2018    BILITOTAL 0.7 03/23/2018    ALBUMIN 3.7 03/23/2018    ALKPHOS 136 03/23/2018    ALT 57 (H) 03/23/2018    AST 28 03/23/2018        INR RESULTS:  Lab Results   Component Value Date    INR 3.42 (H) 03/07/2018       Lab Results   Component Value Date    MAG 1.9 04/12/2018     Lab Results   Component Value Date    NTBNPI 2348 (H) 06/26/2017       Assessment and Plan:   Ms. Amelia Michel is a 57yr old female with a history of VSD (s/p repair 1969), HTN, longstanding RA (on Plaquenil and prednisone), atrial tachyarrhythmias (SVT, AF, AFL; s/p multiple DCCVs, last 4/12/18), past cardiac arrest, chronic diastolic heart failure, and DBLCL (in remission) who presents to clinic today for follow up.    Labs today show stable electrolytes and kidney function.  cMRI from 4/12 showed normal LV size and function, with LVEF 54%; mildly dilated RV size with normal RV function and RVEF 49%; moderate-severe TR; hyperenhancement consistent with RV pressure/volume overload; loculated exudative pericardial effusion that is beginning to organize with diffuse pericardial enhancement, c/w ongoing inflammation.    She has completed her prescribed course of colchicine for pericarditis (3/8-4/13).  Her cMRI shows continued signs of pericarditis, so will discuss further treatment with Dr. Eaton.    Overall, Ms. Michel continues to do well.  Her weight, volume status, and BP remain stable, so advised that she continue the remainder of her current medication regimen with no changes.    In re: to her left arm wound, discussed that her AFib should not limit her from undergoing surgery.  Discussed that she should see her PCP, as scheduled, for her pre-op physical, and that she should continue her atenolol, digoxin, and lasix prior to and after her  surgery.  Her Eliquis may need to be held for 1-2 days, if indicated per PCP/surgeon's recommendations.    Chronic diastolic heart failure  Stage C  NYHA Class II  - volume status:  Euvolemic, on lasix 20mg daily  - BP:  Controlled on atenolol 25mg twice daily  - Aldosterone antagonist:  Spironolactone 25mg daily    Atrial tachyarrhythmias  S/p multiple DCCVs.  Remains on BB, digoxin, and eliquis.  Her HR appeared regular and rate controlled today in clinic, however as her atria are large, she will likely not maintain NSR long-term.  Plan is to keep her rate-controlled as able.        The above was reviewed with Ms. Michel, who verbalized understanding and will call with further questions/concerns.    Xena Longo, DNP, APRN, FNP-BC  Advanced Heart Failure Nurse Practitioner  [facility]  [phone]        Patient Care Team:  Comfort Sabillon MD as PCP - General (Family Practice)  Lenore Rodriguez MD as MD (Internal Medicine)  Kay Miller, RN as Nurse Coordinator (Hematology & Oncology)  Archana Green PA-C as Physician Assistant (Physician Assistant)  Juan Eaton MD as MD (Clinical Cardiac Electrophysiology)  Stacie Delgado, RN as Registered Nurse (Clinical Cardiac Electrophysiology)  Shirley Eller, RN as Registered Nurse (General Surgery)  Kevin Sheldon MD as MD (Plastic Surgery)  COMFORT SABILLON

## 2018-04-24 NOTE — PROGRESS NOTES
Outpatient Physical Therapy Discharge Note     Patient: Amelia Michel  : 1960    Beginning/End Dates of Reporting Period:  18 to 2018    Referring Provider: Dr. Lenore Rodriguez    Therapy Diagnosis: cancer-related fatigue, weakness: U/LE, decreased gait, neuropathy     Client Self Report: Pt was cardioverted x 1 since last PT visit, prior to MRI.     Objective Measurements:  Objective Measure: 6  MW  Details: 1280    Objective Measure: FACIT-F fatigue survey:  Details: 34, inital score was 14. Higher scores indicate less fatigue.     Objective Measure: shoulder pain  Details: much better, infrequent mild pain with abduction activites    Objective Measure: neck pain  Details: no longer has pain                  Outcome Measures (most recent score):     Six Minute Walk (meters). An increase of 70 or more meters indicates statistically significant change.: 390    Facit-F 34    Goals:  Goal Identifier 6 min walk test   Goal Description increase 6 min walk test by 70 meters to 350 meters to increase ability to do errands without fatigue   Target Date 18   Date Met   18   Progress:     Goal Identifier driving   Goal Description Pt will have ankle ROM and strength to be able to drive for 30 minutes   Target Date 18   Date Met   18   Progress:     Goal Identifier home program   Goal Description Pt will be independent in home program of strengthing, ROM and aerobic conditioning and know how to advance it w/o increasing fatigue.   Target Date 18   Date Met   18   Progress:           Progress Toward Goals:   Progress this reporting period: Pt met all goals and pain has decreased significantly.     Plan:  Discharge from therapy.    Discharge:    Reason for Discharge: Patient has met all goals.    Equipment Issued: theraband    Discharge Plan: Patient to continue home program.

## 2018-04-26 ENCOUNTER — TELEPHONE (OUTPATIENT)
Dept: UROLOGY | Facility: CLINIC | Age: 58
End: 2018-04-26

## 2018-04-27 ENCOUNTER — CARE COORDINATION (OUTPATIENT)
Dept: CARDIOLOGY | Facility: CLINIC | Age: 58
End: 2018-04-27

## 2018-04-27 DIAGNOSIS — I31.9 PERICARDITIS: Primary | ICD-10-CM

## 2018-04-27 RX ORDER — COLCHICINE 0.6 MG/1
0.6 TABLET ORAL DAILY
Qty: 60 TABLET | Refills: 0 | Status: SHIPPED | OUTPATIENT
Start: 2018-04-27 | End: 2018-08-15

## 2018-04-27 NOTE — PROGRESS NOTES
Date: 4/27/2018    Time of Call: 1:37 PM     Diagnosis:  Heart failure     [ TORB ] Ordering provider: Xena JOHNSON CNP  Order: restart colchicine 0.6 mg daily x 2 months.      Order received by: Evie Pierce RN     Follow-up/additional notes: Dr. Eaton gave you the ok to proceed with elbow surgery.

## 2018-05-02 ENCOUNTER — TELEPHONE (OUTPATIENT)
Dept: WOUND CARE | Facility: CLINIC | Age: 58
End: 2018-05-02

## 2018-05-02 NOTE — TELEPHONE ENCOUNTER
Returned call, phone number to medical records given [phone]    ----- Message from Shirley Eller RN sent at 4/30/2018  7:56 AM CDT -----  Kelly Lafleur [facility] med records   [phone]   Pt ID [id number]

## 2018-05-10 ENCOUNTER — MYC MEDICAL ADVICE (OUTPATIENT)
Dept: FAMILY MEDICINE | Facility: CLINIC | Age: 58
End: 2018-05-10

## 2018-05-10 ENCOUNTER — TELEPHONE (OUTPATIENT)
Dept: FAMILY MEDICINE | Facility: CLINIC | Age: 58
End: 2018-05-10

## 2018-05-10 DIAGNOSIS — R39.15 URINARY URGENCY: Primary | ICD-10-CM

## 2018-05-10 DIAGNOSIS — N39.41 URGE INCONTINENCE: ICD-10-CM

## 2018-05-10 NOTE — TELEPHONE ENCOUNTER
Reason for Call: Request for an order or referral:    Order or referral being requested: [facility] requesting orders:  1)  Nursing once weekly.    Please review and advise. Thank you..Kellee Taylor    Date needed: as soon as possible    Has the patient been seen by the PCP for this problem? YES    Phone number Patient can be reached at:  Other phone number:  [phone]*    Best Time:  Any time    Can we leave a detailed message on this number?  YES    Call taken on 5/10/2018 at 10:00 AM by Kellee Taylor

## 2018-05-11 ENCOUNTER — PRE VISIT (OUTPATIENT)
Dept: CARDIOLOGY | Facility: CLINIC | Age: 58
End: 2018-05-11

## 2018-05-11 DIAGNOSIS — I48.0 PAROXYSMAL ATRIAL FIBRILLATION (H): Primary | ICD-10-CM

## 2018-05-11 NOTE — TELEPHONE ENCOUNTER
[facility] calling twice today asking for these orders.  Could you please review and advise.  They need by Monday 5/14/18.  Thank you..Kellee Taylor

## 2018-05-11 NOTE — TELEPHONE ENCOUNTER
Left message for patient.  She was RX'd myrbetriq and the refill is for detrol.  Need to clarify which one she would like to take.      Ramonita Rivers, RN, BSN  Urology Patient Care Supervisor

## 2018-05-11 NOTE — TELEPHONE ENCOUNTER
tolterodine (DETROL LA) 4 MG 24 hr capsule      Last Written Prescription Date:  Historical     Last Office Visit : 3/1/18  Future Office visit:  None    Routing refill request to provider for review/approval because:  Medication is reported/historical

## 2018-05-12 NOTE — TELEPHONE ENCOUNTER
Ok for orders. I have not seen this patient in 2 years though. I have asked to be removed as her primary. She needs to have these orders sent to someone who has actually seen her. She has saw Magda in March 1 time so from now on I think this will have to go to her.  Comfort Sabillon M.D.

## 2018-05-13 DIAGNOSIS — S41.102S WOUND OF LEFT UPPER EXTREMITY, SEQUELA: Primary | ICD-10-CM

## 2018-05-13 RX ORDER — CLINDAMYCIN PHOSPHATE 900 MG/50ML
900 INJECTION, SOLUTION INTRAVENOUS
Status: CANCELLED | OUTPATIENT
Start: 2018-05-13

## 2018-05-13 RX ORDER — CLINDAMYCIN PHOSPHATE 900 MG/50ML
900 INJECTION, SOLUTION INTRAVENOUS SEE ADMIN INSTRUCTIONS
Status: CANCELLED | OUTPATIENT
Start: 2018-05-13

## 2018-05-14 ENCOUNTER — TELEPHONE (OUTPATIENT)
Dept: FAMILY MEDICINE | Facility: CLINIC | Age: 58
End: 2018-05-14

## 2018-05-14 ENCOUNTER — TELEPHONE (OUTPATIENT)
Dept: PLASTIC SURGERY | Facility: CLINIC | Age: 58
End: 2018-05-14

## 2018-05-14 RX ORDER — TOLTERODINE 4 MG/1
4 CAPSULE, EXTENDED RELEASE ORAL DAILY
Qty: 30 CAPSULE | Refills: 11 | Status: SHIPPED | OUTPATIENT
Start: 2018-05-14 | End: 2019-02-28

## 2018-05-14 ASSESSMENT — ENCOUNTER SYMPTOMS
HYPERTENSION: 0
TREMORS: 0
DIZZINESS: 0
PARALYSIS: 0
WEAKNESS: 1
ALTERED TEMPERATURE REGULATION: 0
MEMORY LOSS: 1
LEG PAIN: 0
CHILLS: 0
WEIGHT GAIN: 0
TINGLING: 1
WEIGHT LOSS: 0
HYPOTENSION: 0
SKIN CHANGES: 0
LIGHT-HEADEDNESS: 0
BRUISES/BLEEDS EASILY: 1
POOR WOUND HEALING: 1
NUMBNESS: 1
LOSS OF CONSCIOUSNESS: 0
HEADACHES: 0
SEIZURES: 0
NAIL CHANGES: 0
DIFFICULTY URINATING: 0
POLYPHAGIA: 0
FEVER: 0
FATIGUE: 1
SYNCOPE: 0
DYSURIA: 0
INCREASED ENERGY: 1
DECREASED APPETITE: 0
SWOLLEN GLANDS: 0
FLANK PAIN: 0
SLEEP DISTURBANCES DUE TO BREATHING: 0
HEMATURIA: 0
EXERCISE INTOLERANCE: 1
HALLUCINATIONS: 0
POLYDIPSIA: 0
ORTHOPNEA: 0
SPEECH CHANGE: 0
PALPITATIONS: 1
DISTURBANCES IN COORDINATION: 1

## 2018-05-14 NOTE — TELEPHONE ENCOUNTER
Reason for Call:  Other call back    Detailed comments: Amelia is requesting to speak with RN.  No other information was given.  Please call and assess. Thank you..Kellee Taylor    Phone Number Patient can be reached at: Home number on file [phone] (home)    Best Time: any time    Can we leave a detailed message on this number? YES    Call taken on 5/14/2018 at 11:10 AM by Kellee Taylor

## 2018-05-14 NOTE — TELEPHONE ENCOUNTER
Reason for Call: Request for an order or referral:    Order or referral being requested: Alyssa requesting orders:  Weekly skilled nursing for wound care and a-fib - along with other multi diagnoses.    Apparently it can be approved by Gala, but co-signed by Dr. Nowak.  Please review and advise.  Thank you..Kellee Taylor    Date needed: as soon as possible    Has the patient been seen by the PCP for this problem? YES    Phone number Patient can be reached at:  Other phone number:  [phone]*    Best Time:  Any time    Can we leave a detailed message on this number?  YES    Call taken on 5/14/2018 at 1:05 PM by Kellee Taylor

## 2018-05-14 NOTE — TELEPHONE ENCOUNTER
I recommend that she contact cardiology about digoxin level. If they are okay with it, they can order it and she can have it drawn at the preop. I also would like them to advise on when she should take the medication.  Gala Stringer PA-C

## 2018-05-14 NOTE — TELEPHONE ENCOUNTER
"Magda: Amelia called and says that she will be see you on 5/17/18 for a pre-op.  She is requesting a digoxin level to be drawn at that time. She said, \"I will not take my digoxin until after the blood draw that day.\" She has an appointment with the cardiologist on 5/16/18.  Frederic Power RN    "

## 2018-05-14 NOTE — TELEPHONE ENCOUNTER
Amelia notified of all of Magda's instructions as noted below. She agreed with plan.  Frederic Power RN

## 2018-05-14 NOTE — TELEPHONE ENCOUNTER
[facility]    Phone Message    May a detailed message be left on voicemail: yes    Reason for Call: Other: Pt said she is out of this medication as of today so needs it as soon as possible. She also mentioned that there was discussion of switching to a different medication but she does not want to do that as it can have cardiac side effects and she is having cardiac issues right now.      Action Taken: Message routed to:  [facility] ([facility]): Urology

## 2018-05-16 ENCOUNTER — TELEPHONE (OUTPATIENT)
Dept: PLASTIC SURGERY | Facility: CLINIC | Age: 58
End: 2018-05-16

## 2018-05-16 ENCOUNTER — OFFICE VISIT (OUTPATIENT)
Dept: CARDIOLOGY | Facility: CLINIC | Age: 58
End: 2018-05-16
Attending: INTERNAL MEDICINE
Payer: COMMERCIAL

## 2018-05-16 VITALS
WEIGHT: 113.8 LBS | SYSTOLIC BLOOD PRESSURE: 125 MMHG | BODY MASS INDEX: 23.89 KG/M2 | HEIGHT: 58 IN | DIASTOLIC BLOOD PRESSURE: 78 MMHG | HEART RATE: 53 BPM | OXYGEN SATURATION: 97 %

## 2018-05-16 DIAGNOSIS — I48.92 ATRIAL FLUTTER, UNSPECIFIED TYPE (H): ICD-10-CM

## 2018-05-16 DIAGNOSIS — I48.0 PAROXYSMAL ATRIAL FIBRILLATION (H): ICD-10-CM

## 2018-05-16 PROCEDURE — 93005 ELECTROCARDIOGRAM TRACING: CPT | Mod: ZF

## 2018-05-16 PROCEDURE — 93010 ELECTROCARDIOGRAM REPORT: CPT | Mod: ZP | Performed by: INTERNAL MEDICINE

## 2018-05-16 PROCEDURE — 99214 OFFICE O/P EST MOD 30 MIN: CPT | Mod: GC | Performed by: INTERNAL MEDICINE

## 2018-05-16 PROCEDURE — G0463 HOSPITAL OUTPT CLINIC VISIT: HCPCS | Mod: 25,ZF

## 2018-05-16 RX ORDER — CLINDAMYCIN HCL 300 MG
300 CAPSULE ORAL ONCE
Qty: 1 CAPSULE | Refills: 3 | Status: SHIPPED | OUTPATIENT
Start: 2018-05-16 | End: 2018-05-16

## 2018-05-16 ASSESSMENT — PAIN SCALES - GENERAL: PAINLEVEL: NO PAIN (0)

## 2018-05-16 NOTE — NURSING NOTE
Chief Complaint   Patient presents with     Follow Up For     1 mo. follow up for DCCV/ EKG     Vitals were taken and medications were reconciled. EKG was performed.  ROSA Zelaya  11:09 AM

## 2018-05-16 NOTE — LETTER
5/16/2018      RE: Amelia Michel  [address]  [facility] [address]       Dear Colleague,    Thank you for the opportunity to participate in the care of your patient, Amelia Michel, at the [facility] Please see a copy of my visit note below.    Electrophysiology Clinic Note  HPI:   Ms. Michel is a 56 year old female who has a past medical history significant for VSD s/p repair 1969, RA, HTN, insomnia, atrial tachyarrhythmias, cardiac arrest, and DLBCL.     She was admitted from 5/15/17-5/23/17 with atrial tachyarrhythmias (SVT, AF, AFL) with symptoms of palpitations, fatigue, and nausea that she had intermittently starting 3 weeks prior to admission. An echo showed LVEF 40-45%, mildly reduced RVEF, moderate biatrial enlargement, mild mitral regurgitation, and moderate tricuspid regurgitation. Prior CMRI from 2015 showed normal function and no significant valvular abnormalities. She was started on Eliquis. She underwent a BRE/DCCV on 5/17/17 c/b hypotension. She then had SVT HR 170s and then went into AF/AFL all within 24 hours after DCCV. She was started on Sotalol and chemically cardioverted. However, she had nausea and thought it was from the Sotalol so this was stopped. She reverted back to AF/AFL and a rate control strategy was adopted. She proved difficult to rate control and remained highly symptomatic. Therefore, she was started on amiodarone. She then converted back to sinus. She continued to report fatigued and nausea despite being in sinus. Given difficult to control arrhthymias, CMRI done showed LVEF 55%, mildly dilated RV with focal area of dyskinesis in RVOT, severe bi-atrial enlargement, severe TR, mild/moderate MR, and DE at RV septal insertion site. RFA was discussed but was defered as patient had a UTI at the time with ESBL. CRP was elevated and remained elevated 72 at discharge. She was sent with Macrobid.  She was then  readmitted on 6/19/17-8/4/17 after suffering an out of hospital cardiac arrest. Her  found her gasping for air and unresponsive in bed. He moved her off the bed, started CPR, and activated EMS. Upon EMS arrival, she was in VF. She was externally defibrillated and had ROSC in the field. She was intubated and brought to [facility]. In the U, she was hypotensive and found to be in escape rhythm 43 bpm. She was taken emergently to cath lab where coronary angiogram showed normal coronary arteries. Temporary pacemaker was placed in RA given sinus arrest. She was also placed on therapeutic hypothermia. Her  reported that she had a syncopal episode while sitting in a chair on 5/26/17 that she did not seek care for. She had been having nausea, diarrhea, and intermittent vomiting over the last week and generally had not been feeling well over the last month. She has also had saddle anesthesia with lower extremity numbness that has been evaluated by neuro as an outpatient. She was started on gabapentin but no conclusive reason. She was noted to have shock liver, CELSA requiring HD, elevated CRP. She continued to have intermittent episodes of AF. Her amiodarone was stopped. She had fevers, cytopenias and was ultimately found to have DLBCL. She was started on chemo cycles. 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 During hospitalization she also developed IV extravasation and necrotic wound. She also struggled with significant hypervolemia/anesarca. She ultimately discharged to TCU on a lifevest. She  was then readmitted on 8/8/17-8/12/17 with strep-mitis intermediate resistance bacteremia felt related to PICC line and neutropenic fevers. She was treated and ultimately discharged. She was then admitted from 9/7/17-9/11/17 with RUE DVT. An echo from 9/11/17 showed LVEF 60-65%, mild pulm HTN, and dilated IVC. She followed up with EP in 9/2017 and was making good  strides in her recovery. She still had LUE wound which was still open. She reported that the lifevest was uncomfortable and she had returned it.     EP Visit 4/4/18:  Since her last visit, when Digoxin was held (in view of its interaction with the recently started Plaquenil), she has had three cardioversions. Over the last one month, she has been to the ED on one occasion for AFib, was admitted with a heart failure exacerbation and cardioverted when she went into AF/RVR while in the hospital, and then on her [facility] clinic follow up was found to be in AF and cardioverted again. She says she has been feeling like she is in AFib since last Thursday again (6 days). She denies any significant SOB, LE edema, orthopnea, PND, chest pain, dizziness, lightheadedness, syncope or near syncope.     EP Visit 5/2/18:  Since her last visit, when her Digoxin was reinitiated, her symptoms have improved considerably. She underwent a successful DCCV and has remained in NSR since. She thinks she may have had one episode of AF that lasted a couple of hours and terminated spontaneously. Notably in the month prior to her Digoxin being reinitiated she had undergone three cardioversions for symptomatic AF. Her fatigue and SOB have also improved significantly. Her cMRI (detailed below) had shown some organization of her [ericardial effusion) and she has been initiated on colchicine. She is scheduled to undergo repeat imaging to look for recurrence of her lymphoma later this month.         PAST MEDICAL HISTORY:  Past Medical History:   Diagnosis Date     Atrial fibrillation (H)      Cancer (H)     lymphoma     Cardiac arrest (H)      Hypertension      Neuropathy      Rheumatoid arthritis(714.0)        CURRENT MEDICATIONS:  Current Outpatient Prescriptions   Medication Sig Dispense Refill     acetaminophen (TYLENOL) 325 MG tablet Take 2 tablets (650 mg) by mouth every 4 hours as needed for mild pain, fever or headaches       apixaban  ANTICOAGULANT (ELIQUIS) 5 MG tablet Take 1 tablet (5 mg) by mouth 2 times daily 180 tablet 3     ascorbic acid 500 MG TABS Take 1 tablet (500 mg) by mouth daily 30 tablet 0     atenolol (TENORMIN) 25 MG tablet Take 1 tablet (25 mg) by mouth 2 times daily 60 tablet 3     calcium polycarbophil (FIBERCON) 625 MG tablet Take 1 tablet by mouth 2 times daily       calcium-vitamin D (CALTRATE) 600-400 MG-UNIT per tablet Take 2 tablets by mouth every morning 60 tablet 0     colchicine 0.6 MG tablet Take 1 tablet (0.6 mg) by mouth daily 60 tablet 0     digoxin (LANOXIN) 125 MCG tablet Take 250mcg daily for 2 days, then decrease to 125mcg daily 90 tablet 3     furosemide (LASIX) 20 MG tablet Take 1 tablet (20 mg) by mouth daily 90 tablet 3     gabapentin (NEURONTIN) 100 MG capsule TAKE TWO CAPSULES BY MOUTH THREE TIMES A  capsule 3     HYDROCORTISONE PO Take 100 mg by mouth IV       hydroxychloroquine (PLAQUENIL) 200 MG tablet Take 1 tablet (200 mg) by mouth 2 times daily 60 tablet 5     multivitamin, therapeutic with minerals (THERA-VIT-M) TABS tablet Take 1 tablet by mouth daily 30 each 0     order for DME Equipment being ordered: BP cuff 1 Device 1     predniSONE (DELTASONE) 5 MG tablet Take 1 tablet (5 mg) by mouth daily 90 tablet 2     spironolactone (ALDACTONE) 25 MG tablet Take 1 tablet (25 mg) by mouth daily 30 tablet 3     tolterodine (DETROL LA) 4 MG 24 hr capsule Take 1 capsule (4 mg) by mouth daily 30 capsule 11       PAST SURGICAL HISTORY:  Past Surgical History:   Procedure Laterality Date     ANESTHESIA CARDIOVERSION N/A 5/17/2017    Procedure: ANESTHESIA CARDIOVERSION;  ANESTHESIA CARDIOVERSION;  Surgeon: GENERIC ANESTHESIA PROVIDER;  Location: [facility] OR     ANESTHESIA CARDIOVERSION  3/12/2018    Procedure: ANESTHESIA CARDIOVERSION;;  Surgeon: GENERIC ANESTHESIA PROVIDER;  Location: [facility] OR     ANESTHESIA CARDIOVERSION N/A 3/23/2018    Procedure: ANESTHESIA CARDIOVERSION;  Anesthesia Cardioversion ;  Surgeon:  "GENERIC ANESTHESIA PROVIDER;  Location: [facility] OR     ANESTHESIA CARDIOVERSION N/A 4/12/2018    Procedure: ANESTHESIA CARDIOVERSION;  Cardioversion;  Surgeon: GENERIC ANESTHESIA PROVIDER;  Location: [facility] OR     ARTHRODESIS WRIST  2000    Right wrist     BONE MARROW ASPIRATE &BIOPSY  7/12/2017          FOOT SURGERY      4 left and 2 right     RELEASE CARPAL TUNNEL BILATERAL       REPAIR VENTRICULAR SEPTAL DEFECT  1969       ALLERGIES:     Allergies   Allergen Reactions     Blood Transfusion Related (Informational Only) Hives     Hives with platelets. Give benadryl premedication.     Metoprolol Other (See Comments)     Pt and  report that metoprolol does not work for her and also reports feeling unwell with this medication. She has been able to tolerate atenolol, which as worked in controlling her HR.      No Clinical Screening - See Comments      Penicillin Allergy Skin Test not performed, see antimicrobial management team progress note 7/5/17.       Penicillins      Tape [Adhesive Tape] Rash       FAMILY HISTORY:  Family History   Problem Relation Age of Onset     Breast Cancer Mother      Hypertension Mother      Alzheimer Disease Mother      Hypertension Father      Blood Disease Father      Lymphoa     Circulatory Father      A Fib     DIABETES Paternal Grandmother        SOCIAL HISTORY:  Social History   Substance Use Topics     Smoking status: Never Smoker     Smokeless tobacco: Never Used     Alcohol use Yes      Comment: Glass of wine two evenings a night       ROS:   A comprehensive 10 point review of systems negative other than as mentioned in HPI.  Exam:  /78 (BP Location: Right arm, Patient Position: Chair, Cuff Size: Adult Regular)  Pulse 53  Ht 1.473 m (4' 10\")  Wt 51.6 kg (113 lb 12.8 oz)  SpO2 97%  BMI 23.78 kg/m2  GENERAL APPEARANCE: alert and no distress  HEENT: alopecia, MMM, PERRLA.   NECK:supple.   RESPIRATORY: lungs clear to auscultation - no rales, rhonchi or wheezes, no use of " accessory muscles, no retractions, respirations are unlabored, normal respiratory rate  CARDIOVASCULAR: normal S1 with physiologic split S2, no S3 or S4 and no murmur, click or rub, precordium quiet with normal PMI.  ABDOMEN: soft, non tender,bowel sounds normal  EXTREMITIES: peripheral pulses normal, trace pedal edema, LUE wound with dressing CDI.  NEURO: alert and oriented to person/place/time, normal speech, gait and affect  SKIN: pale, fragile, no rashes    Labs:  Reviewed.     Testing/Procedures:  9/11/17 ECHOCARDIOGRAM:   Interpretation Summary  Left ventricular function, chamber size, wall motion, and wall thickness are  normal.The EF is 60-65% (traced 60%.) GLS -18%.  The right ventricle is normal size and normal in function. The RV is mildly  dilated.  Moderate tricuspid insufficiency is present.  Mild pulmonary hypertension is present (esimated PASP 55 mmHg including RA  pressure.)  Dilation of the inferior vena cava is present with abnormal respiratory  variation in diameter (esitimated RAP 15 mmHg.)  This study was compared with the study from 8/31/17: TR appears slightly  decreased from the earlier study.    7/24/17 CMRI:     1. The LV is normal in cavity size and wall thickness. The global systolic function is normal. The LVEF is  64%. There are no regional wall motion abnormalities. No evidence of myocardial iron overload.      2. The RV is normal in cavity size. The global systolic function is normal. The RVEF is 59%.      3. There is moderate dilation of bilateral atria.      4. Tricuspid regurgitation is present, difficult to quantify due to image quality.      5. Late gadolinium enhancement imaging shows RV insertion site hyperenhancement, a non-specific finding  seen in patients with RV volume/pressure overload.      6. There is a moderate right pleural effusion and small left pleural effusion.      CONCLUSIONS: Normal Bi-Ventricular systolic function, LVEF 64% and RVE 59%. There is no evidence  of  infiltrative disease. Compared to the MRI from 5/15/2017, there is no significant change.     7/2017 BRE:  Interpretation Summary  There is a small mass (0.7 cm by 0.2 cm) on the ventricular side of the right  coronary cusp. There is a second mass (0.4 cm by 0.2 cm) observed in the LVOT,  attached to the mitro-aortic curtain. There is a third mass on the noncoronary  cusp that measures 0.4 cm by 0.2 cm that could be a healing vegetation. These  findings are compatible with endocarditis if clinical picture supports.  Right ventricular function, chamber size, wall motion, and thickness are  normal.  This study was compared with the study from 7/10/2017.  There is detection of masses on the aortic valve and LVOT.    Cardiac MRI - 4/12/18:  1. The LV is normal in cavity size and wall thickness. The global systolic function is normal. The LVEF is  54%. There are no regional wall motion abnormalities.     2. The RV is mildly dilated in cavity size. The global systolic function is normal. The RVEF is 49%.      3. Both atria are severely enlarged.     4. There is at least moderate, possibly severe tricuspid regurgitation.      5. Late gadolinium enhancement imaging shows hyperenhancement in the RV insertion site consistent with RV  pressure/volume overload.      6. There is a loculated pericardial effusion along the basal lateral and basal to mid inferior LV walls,  and along the inferior RV wall. it appears exudative in nature, and is beginning to organize. There is  diffuse pericardial enhancement.     7. There is no intracardiac thrombus or mass.     8. There is a small right pleural effusion.    Assessment and Plan:   Ms. Michel is a 56 year old female who has a past medical history significant for VSD s/p repair 1969, RA, HTN, insomnia, atrial tachyarrhythmias, cardiac arrest, and newly diagnosed DLBCL.   She initially presented in 5/2017 with symptoamtic atrial tachyarrhythmias (SVT, AF, AFL). She underwent DCCV  with early recurrence and was started on AAT. She was thought to not tolerate Sotalol and was switched to amiodarone. However, she continued to have fatigue, nausea, and general malaise despite being in sinus. She then suffered out of hospital cardiac arrest and was found in VF by EMS and defibrillated in field. She had a prolonged hospital course c/b anasarca, CELSA, shock liver, fevers, cytopenias and was ultimately found to have DLBCL and started on treatment. She presents today for follow up. She reports feeling well. She continues to have LUE wound and will be meeting with Plastics.   After her Digoxin was reinitiated on her last visit, her symptoms have improved considerably. She underwent a successful DCCV on  and has remained in NSR since, barring possibly one AF episode that terminated spontaneously after lasting for a couple of hours.  Her Digoxin levels have been demonstrated to be therapeutic and she her cardiac MRI shows normal LV function and a loculated pericardial effusion.       Atrial Fibrillation:  We discussed in detail with the patient management/treatment options for Krystyna includin. Stroke Prophylaxis:  CHADSVASC=+HTN, +gender  2, corresponding to a 2.2% annual stroke / systemic emolism event rate. indicating need for long term oral anticoagulation.  She is now able to be anticoagulated per her Oncology team. We will continue Eliquis 5 mg BID.   2. Rate Control: Continue Atenolol and Digoxin. Her symptoms have improved remarkably on this current regimen. .   3. Rhythm Control: Cardioversion, Antiarrhythmics and/or ablation are options for rhythm control. Since her last DCCV on  she has remained in NSR.   .   Notably, she has possible side effects from Sotalol (however, likely was due to underlying illness at the time and not from medication). Was on amiodarone with continued episodes and stopped. If she continues to have frequent symptomatic episodes of AF/AFl, given her history of  adverse reactions to AAT, she may be a candidate for ablation.     4. Risk Factor Management: maintain tight BP control, and PB evaluation as indicated.         She will follow up with heme/onc later this month to assess for any recurrence.  She will follow up with Dr. Eaton in three months.     The patient states understanding and is agreeable with plan.   This patient was seen and evaluated with Dr. Eaton. The above note reflects our joint assessment and plan.     [place] MD  Cardiovascular Disease Fellow  Pager: [phone]    EP STAFF NOTE  Patient seen and examined and management plan personally reviewed with the patient. I agree with the note above by the CV/EP fellow.  Juan Eaton MD [facility]  Cardiology - Electrophysiology

## 2018-05-16 NOTE — MR AVS SNAPSHOT
After Visit Summary   5/16/2018    Amelia Michel    MRN: [id number]           Patient Information     Date Of Birth          1960        Visit Information        Provider Department      5/16/2018 11:00 AM Juan Eaton MD [facility]        Today's Diagnoses     Atrial flutter, unspecified type (H)        Paroxysmal atrial fibrillation (H)          Care Instructions    No changes in medications  Follow up with Dr. Eaton in 3-4 months  Dig level ordered at patient's request to be done tomorrow.  Cleocin ordered x1dose for dental procedure.      Acrelia Winn, RN  Cardiology Care Coordinator  Please be aware that I work part-time but I will be checking messages several times per week.   For urgent needs, please call the number below.    [phone], press 1 for [employer], press 3 to speak to a nurse    .          Follow-ups after your visit        Additional Services     Follow-Up with Cardiologist                 Your next 10 appointments already scheduled     May 17, 2018 11:20 AM CDT   Pre-[facility] physical with Gala Stringer [facility]-C   [facility]    [address] [facility] [address] [phone]            May 22, 2018   Procedure with Kevin Sheldon MD   [facility] ([facility])    [address] [place] [address]  5th [facility] [address] [phone]           Located in the Clinics and Surgery Center at [address] [place] [address].   parking is very convenient and highly recommended.  is a $6 flat rate fee.  Both  and self parkers should enter the main arrival plaza from [facility]; parking attendants will direct you based on your parking preference.            May 24, 2018 12:00 PM CDT   CT CHEST ABDOMEN PELVIS W/O & W CONTRAST with UCCT1   [facility] ([facility])    [address] [place] [address]  [id number] [phone]           Please bring any scans or X-rays taken at other hospitals, if similar tests were done. Also bring a list of your medicines, including vitamins, minerals and over-the-counter drugs. It is safest to leave personal items at home.  Be sure to tell your doctor:   If you have any allergies.   If there s any chance you are pregnant.   If you are breastfeeding.  How to prepare:   Do not eat or drink for 2 hours before your exam. If you need to take medicine, you may take it with small sips of water. (We may ask you to take liquid medicine as well.)   Please wear loose clothing, such as a sweat suit or jogging clothes. Avoid snaps, zippers and other metal. We may ask you to undress and put on a hospital gown.  Please arrive 30 minutes early for your CT. Once in the department you might be asked to drink water 15-20 minutes prior to your exam.  If indicated you may be asked to drink an oral contrast in advance of your CT.  If this is the case, the imaging team will let you know or be in contact with you prior to your appointment  Patients over 70 or patients with diabetes or kidney problems:   If you haven t had a blood test (creatinine test) within the last 30 days, the Cardiologist/Radiologist may require you to get this test prior to your exam.  If you have diabetes:   Continue to take your metformin medication on the day of your exam  If you have any questions, please call the Imaging Department where you will have your exam.            May 24, 2018  3:00 PM CDT   [facility] with  [facility]   [facility] ([facility])    [address] [place] [address]  [address] [place] [address] [phone]            May 24, 2018  3:30 PM CDT   RETURN ONC with Lenore Rodriguez MD   [facility] ([facility])    [address] [place] [address]  [address]  [facility] [address] [phone]             May 31, 2018  4:30 PM CDT   (Arrive by 4:15 PM)   Return Visit with Nayeli Pritchett MD   [facility] ([facility])    [address]  [address]  [facility] [address]   [phone]            Jun 01, 2018  3:00 PM CDT   (Arrive by 2:45 PM)   Return Plastic Surgery with Kevin Sheldon MD   [facility] ([facility])    [address]  4th Floor  [facility] [id number] [phone]           Do not wear perfume.            Jun 06, 2018  8:45 AM CDT   (Arrive by 8:30 AM)   Cystoscopy with Vadim Sparks MD   [facility] ([facility])    [address]  4th Floor  [facility] [id number] [phone]            Aug 15, 2018 10:00 AM CDT   (Arrive by 9:45 AM)   RETURN ARRHYTHMIA with Juan Eaton MD   [facility] ([facility])    [address]  [address]  [facility] [id number] [phone]              Future tests that were ordered for you today     Open Future Orders        Priority Expected Expires Ordered    Follow-Up with Cardiologist Routine 8/16/2018 10/21/2018 5/16/2018    Digoxin level Routine 5/17/2018 5/16/2019 5/16/2018            Who to contact     If you have questions or need follow up information about today's clinic visit or your schedule please contact [facility] directly at [phone].  Normal or non-critical lab and imaging results will be communicated to you by MyChart, letter or phone within 4 business days after the clinic has received the results. If you do not hear from us within 7 days, please contact the clinic through MyChart or phone. If you have a critical or abnormal lab result, we will notify you by phone as soon as possible.  Submit refill requests through [employer] or call your pharmacy and they will forward the refill request to  "us. Please allow 3 business days for your refill to be completed.          Additional Information About Your Visit        Drop â€™til you [employer] Information     [employer] gives you secure access to your electronic health record. If you see a primary care provider, you can also send messages to your care team and make appointments. If you have questions, please call your primary care clinic.  If you do not have a primary care provider, please call [phone] and they will assist you.        Care EveryWhere ID     This is your Care EveryWhere ID. This could be used by other organizations to access your [facility] medical records  [id number]        Your Vitals Were     Pulse Height Pulse Oximetry BMI (Body Mass Index)          53 1.473 m (4' 10\") 97% 23.78 kg/m2         Blood Pressure from Last 3 Encounters:   05/16/18 125/78   04/24/18 124/78   04/12/18 105/66    Weight from Last 3 Encounters:   05/16/18 51.6 kg (113 lb 12.8 oz)   04/24/18 52.2 kg (115 lb 1.6 oz)   04/04/18 53.2 kg (117 lb 4.8 oz)              We Performed the Following     EKG 12-lead, tracing only (Future)     Follow-Up with Electrophysiologist          Today's Medication Changes          These changes are accurate as of 5/16/18 12:38 PM.  If you have any questions, ask your nurse or doctor.               Start taking these medicines.        Dose/Directions    clindamycin 300 MG capsule   Commonly known as:  CLEOCIN   Used for:  Atrial flutter, unspecified type (H), Paroxysmal atrial fibrillation (H)   Started by:  Juan Eaton MD        Dose:  300 mg   Take 1 capsule (300 mg) by mouth once for 1 dose Take one hr. Before dental procedure   Quantity:  1 capsule   Refills:  3            Where to get your medicines      These medications were sent to [facility] - [address]  [address] [address] [place] [address]     Phone:  [phone]     clindamycin 300 MG capsule                Primary Care Provider Office Phone # Fax #    " Comfort Sabillon [phone]       [address] [address] [facility] [address]        Equal Access to Services     CATINA HEAD : Kay laurita mcguire prachi Flores, watashda luqtania, emelyta kadelbert bae, durga barriosall henny. So [facility] [phone].    ATENCIÓN: Si habla español, tiene a sarmiento disposición servicios gratuitos de asistencia lingüística. Llame al [phone].    We comply with applicable federal civil rights laws and [place] laws. We do not discriminate on the basis of race, color, national origin, age, disability, sex, sexual orientation, or gender identity.            Thank you!     Thank you for choosing [facility]  for your care. Our goal is always to provide you with excellent care. Hearing back from our patients is one way we can continue to improve our services. Please take a few minutes to complete the written survey that you may receive in the mail after your visit with us. Thank you!             Your Updated Medication List - Protect others around you: Learn how to safely use, store and throw away your medicines at www.disposemymeds.org.          This list is accurate as of 5/16/18 12:38 PM.  Always use your most recent med list.                   Brand Name Dispense Instructions for use Diagnosis    acetaminophen 325 MG tablet    TYLENOL     Take 2 tablets (650 mg) by mouth every 4 hours as needed for mild pain, fever or headaches    Diffuse large B-cell lymphoma of extranodal site (H)       apixaban ANTICOAGULANT 5 MG tablet    ELIQUIS    180 tablet    Take 1 tablet (5 mg) by mouth 2 times daily    Paroxysmal atrial fibrillation (H)       ascorbic acid 500 MG Tabs     30 tablet    Take 1 tablet (500 mg) by mouth daily    Diffuse large B-cell lymphoma, unspecified body region (H)       atenolol 25 MG tablet    TENORMIN    60 tablet    Take 1 tablet (25 mg) by mouth 2 times daily    Atrial tachycardia (H)       calcium polycarbophil 625 MG tablet     FIBERCON     Take 1 tablet by mouth 2 times daily        calcium-vitamin D 600-400 MG-UNIT per tablet    CALTRATE    60 tablet    Take 2 tablets by mouth every morning    Diffuse large B-cell lymphoma, unspecified body region (H)       clindamycin 300 MG capsule    CLEOCIN    1 capsule    Take 1 capsule (300 mg) by mouth once for 1 dose Take one hr. Before dental procedure    Atrial flutter, unspecified type (H), Paroxysmal atrial fibrillation (H)       colchicine 0.6 MG tablet    COLCYRS    60 tablet    Take 1 tablet (0.6 mg) by mouth daily    Pericarditis       digoxin 125 MCG tablet    LANOXIN    90 tablet    Take 250mcg daily for 2 days, then decrease to 125mcg daily    Atrial flutter, unspecified type (H)       furosemide 20 MG tablet    LASIX    90 tablet    Take 1 tablet (20 mg) by mouth daily    Chronic systolic heart failure (H)       gabapentin 100 MG capsule    NEURONTIN    180 capsule    TAKE TWO CAPSULES BY MOUTH THREE TIMES A DAY    Critical illness myopathy       HYDROCORTISONE PO      Take 100 mg by mouth IV        hydroxychloroquine 200 MG tablet    PLAQUENIL    60 tablet    Take 1 tablet (200 mg) by mouth 2 times daily    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 (H)       multivitamin, therapeutic with minerals Tabs tablet     30 each    Take 1 tablet by mouth daily    Diffuse large B-cell lymphoma, unspecified body region (H)       order for DME     1 Device    Equipment being ordered: BP cuff    Hypertension, unspecified type       predniSONE 5 MG tablet    DELTASONE    90 tablet    Take 1 tablet (5 mg) by mouth daily    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 (H)       spironolactone 25 MG tablet    ALDACTONE    30 tablet    Take 1 tablet (25 mg) by mouth daily    Acute on chronic diastolic heart failure (H)       tolterodine 4 MG 24 hr capsule    DETROL LA    30 capsule    Take 1 capsule (4 mg) by mouth daily    Urinary urgency,  Urge incontinence

## 2018-05-16 NOTE — PATIENT INSTRUCTIONS
No changes in medications  Follow up with Dr. Eaton in 3-4 months  Dig level ordered at patient's request to be done tomorrow.  Cleocin ordered x1dose for dental procedure.      Arcelia Winn, RN  Cardiology Care Coordinator  Please be aware that I work part-time but I will be checking messages several times per week.   For urgent needs, please call the number below.    [phone], press 1 for [employer], press 3 to speak to a nurse    .

## 2018-05-17 ENCOUNTER — RADIANT APPOINTMENT (OUTPATIENT)
Dept: GENERAL RADIOLOGY | Facility: CLINIC | Age: 58
End: 2018-05-17
Attending: PHYSICIAN ASSISTANT
Payer: COMMERCIAL

## 2018-05-17 ENCOUNTER — OFFICE VISIT (OUTPATIENT)
Dept: FAMILY MEDICINE | Facility: CLINIC | Age: 58
End: 2018-05-17
Payer: COMMERCIAL

## 2018-05-17 ENCOUNTER — TELEPHONE (OUTPATIENT)
Dept: UROLOGY | Facility: CLINIC | Age: 58
End: 2018-05-17

## 2018-05-17 ENCOUNTER — CARE COORDINATION (OUTPATIENT)
Dept: PLASTIC SURGERY | Facility: CLINIC | Age: 58
End: 2018-05-17

## 2018-05-17 VITALS
WEIGHT: 113.9 LBS | BODY MASS INDEX: 23.81 KG/M2 | HEART RATE: 54 BPM | DIASTOLIC BLOOD PRESSURE: 73 MMHG | TEMPERATURE: 97.7 F | OXYGEN SATURATION: 98 % | SYSTOLIC BLOOD PRESSURE: 120 MMHG

## 2018-05-17 DIAGNOSIS — M05.79 RHEUMATOID ARTHRITIS INVOLVING MULTIPLE SITES WITH POSITIVE RHEUMATOID FACTOR (H): ICD-10-CM

## 2018-05-17 DIAGNOSIS — D72.819 LEUKOPENIA, UNSPECIFIED TYPE: ICD-10-CM

## 2018-05-17 DIAGNOSIS — I47.19 ATRIAL TACHYCARDIA (H): ICD-10-CM

## 2018-05-17 DIAGNOSIS — I48.92 ATRIAL FLUTTER, UNSPECIFIED TYPE (H): ICD-10-CM

## 2018-05-17 DIAGNOSIS — S41.102S WOUND OF LEFT UPPER EXTREMITY, SEQUELA: ICD-10-CM

## 2018-05-17 DIAGNOSIS — C83.398 DIFFUSE LARGE B-CELL LYMPHOMA OF EXTRANODAL SITE: ICD-10-CM

## 2018-05-17 DIAGNOSIS — Z01.818 PREOP GENERAL PHYSICAL EXAM: Primary | ICD-10-CM

## 2018-05-17 DIAGNOSIS — I10 ESSENTIAL HYPERTENSION: ICD-10-CM

## 2018-05-17 DIAGNOSIS — Z01.818 PREOP GENERAL PHYSICAL EXAM: ICD-10-CM

## 2018-05-17 DIAGNOSIS — I48.0 PAROXYSMAL ATRIAL FIBRILLATION (H): Primary | ICD-10-CM

## 2018-05-17 DIAGNOSIS — I89.0 LYMPHEDEMA OF BOTH LOWER EXTREMITIES: ICD-10-CM

## 2018-05-17 DIAGNOSIS — I48.0 PAROXYSMAL ATRIAL FIBRILLATION (H): ICD-10-CM

## 2018-05-17 LAB
ALBUMIN SERPL-MCNC: 4.4 G/DL (ref 3.4–5)
ALP SERPL-CCNC: 204 U/L (ref 40–150)
ALT SERPL W P-5'-P-CCNC: 46 U/L (ref 0–50)
ANION GAP SERPL CALCULATED.3IONS-SCNC: 5 MMOL/L (ref 3–14)
AST SERPL W P-5'-P-CCNC: 43 U/L (ref 0–45)
BASOPHILS # BLD AUTO: 0 10E9/L (ref 0–0.2)
BASOPHILS NFR BLD AUTO: 1.3 %
BILIRUB SERPL-MCNC: 1 MG/DL (ref 0.2–1.3)
BUN SERPL-MCNC: 20 MG/DL (ref 7–30)
CALCIUM SERPL-MCNC: 9.8 MG/DL (ref 8.5–10.1)
CHLORIDE SERPL-SCNC: 101 MMOL/L (ref 94–109)
CO2 SERPL-SCNC: 29 MMOL/L (ref 20–32)
CREAT SERPL-MCNC: 0.81 MG/DL (ref 0.52–1.04)
DIFFERENTIAL METHOD BLD: ABNORMAL
DIGOXIN SERPL-MCNC: 0.7 UG/L (ref 0.5–2)
EOSINOPHIL # BLD AUTO: 0 10E9/L (ref 0–0.7)
EOSINOPHIL NFR BLD AUTO: 1.3 %
ERYTHROCYTE [DISTWIDTH] IN BLOOD BY AUTOMATED COUNT: 12.5 % (ref 10–15)
GFR SERPL CREATININE-BSD FRML MDRD: 73 ML/MIN/1.7M2
GLUCOSE SERPL-MCNC: 107 MG/DL (ref 70–99)
HCT VFR BLD AUTO: 43 % (ref 35–47)
HGB BLD-MCNC: 15.7 G/DL (ref 11.7–15.7)
IMM GRANULOCYTES # BLD: 0 10E9/L (ref 0–0.4)
IMM GRANULOCYTES NFR BLD: 1.3 %
INTERPRETATION ECG - MUSE: NORMAL
LYMPHOCYTES # BLD AUTO: 0.4 10E9/L (ref 0.8–5.3)
LYMPHOCYTES NFR BLD AUTO: 18.7 %
MCH RBC QN AUTO: 36.9 PG (ref 26.5–33)
MCHC RBC AUTO-ENTMCNC: 36.5 G/DL (ref 31.5–36.5)
MCV RBC AUTO: 101 FL (ref 78–100)
MONOCYTES # BLD AUTO: 0.5 10E9/L (ref 0–1.3)
MONOCYTES NFR BLD AUTO: 23 %
NEUTROPHILS # BLD AUTO: 1.3 10E9/L (ref 1.6–8.3)
NEUTROPHILS NFR BLD AUTO: 54.4 %
PLATELET # BLD AUTO: 122 10E9/L (ref 150–450)
PLATELET # BLD EST: ABNORMAL 10*3/UL
POTASSIUM SERPL-SCNC: 3.9 MMOL/L (ref 3.4–5.3)
PROT SERPL-MCNC: 7.9 G/DL (ref 6.8–8.8)
RBC # BLD AUTO: 4.25 10E12/L (ref 3.8–5.2)
RBC MORPH BLD: NORMAL
SODIUM SERPL-SCNC: 135 MMOL/L (ref 133–144)
WBC # BLD AUTO: 2.4 10E9/L (ref 4–11)

## 2018-05-17 PROCEDURE — 80053 COMPREHEN METABOLIC PANEL: CPT | Performed by: INTERNAL MEDICINE

## 2018-05-17 PROCEDURE — 85025 COMPLETE CBC W/AUTO DIFF WBC: CPT | Performed by: INTERNAL MEDICINE

## 2018-05-17 PROCEDURE — 80162 ASSAY OF DIGOXIN TOTAL: CPT | Performed by: INTERNAL MEDICINE

## 2018-05-17 PROCEDURE — 72040 X-RAY EXAM NECK SPINE 2-3 VW: CPT | Mod: FY

## 2018-05-17 PROCEDURE — 99215 OFFICE O/P EST HI 40 MIN: CPT | Performed by: PHYSICIAN ASSISTANT

## 2018-05-17 PROCEDURE — 36415 COLL VENOUS BLD VENIPUNCTURE: CPT | Performed by: INTERNAL MEDICINE

## 2018-05-17 RX ORDER — CLINDAMYCIN HCL 300 MG
300 CAPSULE ORAL ONCE
Qty: 1 CAPSULE | Refills: 0 | Status: SHIPPED | OUTPATIENT
Start: 2018-05-17 | End: 2018-05-17

## 2018-05-17 RX ORDER — HYDROCORTISONE 5 MG/1
25 TABLET ORAL DAILY
Qty: 5 TABLET | Refills: 0 | Status: SHIPPED | OUTPATIENT
Start: 2018-05-17 | End: 2019-03-26

## 2018-05-17 NOTE — PROGRESS NOTES
[facility]  [address] [place] [address]  [phone]  Dept: [phone]    PRE-OP EVALUATION:  Today's date: 2018    Amelia Michel (: 1960) presents for pre-operative evaluation assessment as requested by Dr. Kevin Sheldon.  She requires evaluation and anesthesia risk assessment prior to undergoing surgery/procedure for treatment of wound of left upper extremity .    Proposed Surgery/ Procedure: Left Arm Wound Excision And Closure, Possible Submuscular Transposition of Ulnar Nerve  (Choice Anesthesia)  Date of Surgery/ Procedure: 2018  Time of Surgery/ Procedure: 12:00pm  Hospital/Surgical Facility: [facility]  Primary Physician: Comfort Sabillon  Type of Anesthesia Anticipated: to be determined - possible MAC    Patient has a Health Care Directive or Living Will:  YES     1.  NO - Do you have a history of heart attack, stroke, stent, bypass or surgery on an artery in the head, neck, heart or legs?  2. NO - Do you ever have any pain or discomfort in your chest?  3. YES diastolic heart failure 3/2018 - Do you have a history of  Heart Failure?  4. NO - Are you troubled by shortness of breath when: walking on the level, up a slight hill or at night?  5. NO - Do you currently have a cold, bronchitis or other respiratory infection?  6. NO - Do you have a cough, shortness of breath or wheezing?  7. YES - has peripheral neuropathy - Do you sometimes get pains in the calves of your legs when you walk?  8. NO - Do you or anyone in your family have previous history of blood clots?  9. YES - patient has low platelets and on Eliquis - Do you or does anyone in your family have a serious bleeding problem such as prolonged bleeding following surgeries or cuts?  10. YES - Have you ever had problems with anemia or been told to take iron pills?  11. NO - Have you had any abnormal blood loss such as black, tarry or bloody stools, or abnormal vaginal  bleeding?  12. YES - anemia due to chemotherapy fall 2017 - Have you ever had a blood transfusion?  13. NO - Have you or any of your relatives ever had problems with anesthesia?  14. NO - Do you have sleep apnea, excessive snoring or daytime drowsiness?  15. NO - Do you have any prosthetic heart valves?  16. NO - Do you have prosthetic joints?  17. NO - Is there any chance that you may be pregnant?      HPI:     HPI related to upcoming procedure:   Per ortho note 3/29/18  PLAN: Patient is requesting excision of her chronic wound given that it is symptomatic with pain and requires daily dressing changes.  Upon review of the MRI, there is evidence of a mass appearing to be fat necrosis lying adjacent to the ulnar nerve. The ulnar nerve will have to be protected during excision by tracking it from distal to proximal, from known to unknown. There is a possibility ulnar nerve submuscular transposition will have to be performed if there are exam findings of ulnar nerve instability following excision. I anticipate that primary closure will be obtainable given her skin laxity on exam.    History of open heart surgery for VSD in 1969  She had diastolic heart failure - spironolactone, lasix  She was treated with colchicine for pericarditis she will be on this for 2 months, ?rheumatological. Cardiology Recommended do not stop prior to surgery.    Cleared for surgery from cardiology 5/16/18. EKG shows sinus rhythm  Will stop Elequis Sunday  She takes digoxin, will get level today. If it is high they will change, if it is low they will keep the same    See problem list for active medical problems.  Problems all longstanding and stable, except as noted/documented.  See ROS for pertinent symptoms related to these conditions.                                                                                                                                                          .  A-FIB - Patient has a longstanding history of chronic  A-fib currently on rate and rhythm control. Current treatment regimen includes Apixaban for stroke prevention and denies significant symptoms of lightheadedness, palpitations or dyspnea.                                                                                                                                                                             .  ANEMIA - Patient has a recent history of moderate-severe anemia, which has not been symptomatic. Work up to date has revealed due to chemo. Treatment has been multivitamin with iron. Had transfusions in the past.                                                                                                                                            .  CHF - Patient has a longstanding history of moderate-severe CHF. Exacerbating conditions include dystolic dysfunction and atrial fibrilation. Currently the patient's condition is same. Current treatment regimen includes beta blocker, digoxin, diuretic and spirolactone. The patient denies chest pain, edema, orthopnea, SOB or recent weight gain. Last Echocardiogram 9/7/17, cardiac MRI 4/12/18, EKG 5/16/18.                                                                                                                                                                               .  HYPERTENSION - Patient has longstanding history of HTN , currently denies any symptoms referable to elevated blood pressure. Specifically denies chest pain, palpitations, dyspnea, orthopnea, PND or peripheral edema. Blood pressure readings have been in normal range. Current medication regimen is as listed below. Patient denies any side effects of medication.                                                                                                                                                                                B cell Lymphoma: she is in remission.  She did chemo including IT chemo last dose 12/2017,  Currently has  follow up shortly after surgery          .    Rheumatoid Arthritis: She is stable/controlled with plaquenil, prednisone 5 mg daily.    MEDICAL HISTORY:     Patient Active Problem List    Diagnosis Date Noted     Atrial flutter, unspecified type (H) 05/17/2018     Priority: Medium     Paroxysmal atrial fibrillation (H) 05/11/2018     Priority: Medium     Palpitations 03/07/2018     Priority: Medium     H/O cardiac arrest 09/26/2017     Priority: Medium     DLBCL (diffuse large B cell lymphoma) (H) 09/07/2017     Priority: Medium     Physical deconditioning 08/12/2017     Priority: Medium     Febrile neutropenia (H) 08/08/2017     Priority: Medium     Diffuse large B cell lymphoma (H) 08/04/2017     Priority: Medium     Diffuse large B-cell lymphoma of extranodal site (H) 07/27/2017     Priority: Medium     Sepsis (H) 06/19/2017     Priority: Medium     Wound of left upper extremity 06/19/2017     Priority: Medium     Critical illness myopathy 06/16/2017     Priority: Medium     Acute respiratory failure with hypoxia (H) 06/09/2017     Priority: Medium     Hypertension      Priority: Medium     Cardiogenic shock (H) 05/29/2017     Priority: Medium     Atrial tachycardia (H) 05/16/2017     Priority: Medium     Lymphedema of both lower extremities 04/04/2017     Priority: Medium     RA (rheumatoid arthritis) (H) 05/02/2016     Priority: Medium     Hyperlipidemia LDL goal <130 02/22/2011     Priority: Medium     HTN (hypertension)      Priority: Medium     Primary pulmonary hypertension (H)      Priority: Medium     Seeing [facility].        Health Care Home 08/21/2017     Priority: Low     *See Letters for [facility] Care Plan: My Access Plan          Past Medical History:   Diagnosis Date     Antiplatelet or antithrombotic long-term use      Arrhythmia      Arthritis      Cancer (H)     lymphoma     Cardiac arrest (H)      History of chemotherapy      Hypertension      Lymphoma (H)      Neuropathy      PONV (postoperative  nausea and vomiting)      Rheumatoid arthritis(714.0)      Past Surgical History:   Procedure Laterality Date     ANESTHESIA CARDIOVERSION N/A 5/17/2017    Procedure: ANESTHESIA CARDIOVERSION;  ANESTHESIA CARDIOVERSION;  Surgeon: GENERIC ANESTHESIA PROVIDER;  Location: [facility] OR     ANESTHESIA CARDIOVERSION  3/12/2018    Procedure: ANESTHESIA CARDIOVERSION;;  Surgeon: GENERIC ANESTHESIA PROVIDER;  Location: [facility] OR     ANESTHESIA CARDIOVERSION N/A 3/23/2018    Procedure: ANESTHESIA CARDIOVERSION;  Anesthesia Cardioversion ;  Surgeon: GENERIC ANESTHESIA PROVIDER;  Location: [facility] OR     ANESTHESIA CARDIOVERSION N/A 4/12/2018    Procedure: ANESTHESIA CARDIOVERSION;  Cardioversion;  Surgeon: GENERIC ANESTHESIA PROVIDER;  Location: [facility] OR     ARTHRODESIS WRIST  2000    Right wrist     BONE MARROW ASPIRATE &BIOPSY  7/12/2017          FOOT SURGERY      4 left and 2 right     RELEASE CARPAL TUNNEL BILATERAL       REPAIR VENTRICULAR SEPTAL DEFECT  1969     Current Outpatient Prescriptions   Medication Sig Dispense Refill     apixaban ANTICOAGULANT (ELIQUIS) 5 MG tablet Take 1 tablet (5 mg) by mouth 2 times daily 180 tablet 3     ascorbic acid 500 MG TABS Take 1 tablet (500 mg) by mouth daily 30 tablet 0     atenolol (TENORMIN) 25 MG tablet Take 1 tablet (25 mg) by mouth 2 times daily 60 tablet 3     calcium polycarbophil (FIBERCON) 625 MG tablet Take 1 tablet by mouth 2 times daily       calcium-vitamin D (CALTRATE) 600-400 MG-UNIT per tablet Take 2 tablets by mouth every morning 60 tablet 0     colchicine 0.6 MG tablet Take 1 tablet (0.6 mg) by mouth daily 60 tablet 0     digoxin (LANOXIN) 125 MCG tablet Take 250mcg daily for 2 days, then decrease to 125mcg daily 90 tablet 3     furosemide (LASIX) 20 MG tablet Take 1 tablet (20 mg) by mouth daily 90 tablet 3     gabapentin (NEURONTIN) 100 MG capsule TAKE TWO CAPSULES BY MOUTH THREE TIMES A  capsule 3     hydroxychloroquine (PLAQUENIL) 200 MG tablet Take 1 tablet (200 mg) by  mouth 2 times daily 60 tablet 5     multivitamin, therapeutic with minerals (THERA-VIT-M) TABS tablet Take 1 tablet by mouth daily 30 each 0     order for DME Equipment being ordered: Thigh high compression stockings Class 2: off the shelf or custom, farrow foot wrap and juxta fit premium lower leg wrap, lymphedema bandaging supplies 3 sets 1 Units 1     predniSONE (DELTASONE) 5 MG tablet Take 1 tablet (5 mg) by mouth daily 90 tablet 2     spironolactone (ALDACTONE) 25 MG tablet Take 1 tablet (25 mg) by mouth daily 30 tablet 3     tolterodine (DETROL LA) 4 MG 24 hr capsule Take 1 capsule (4 mg) by mouth daily 30 capsule 11     acetaminophen (TYLENOL) 325 MG tablet Take 2 tablets (650 mg) by mouth every 4 hours as needed for mild pain, fever or headaches       HYDROCORTISONE PO Take 100 mg by mouth IV       order for DME Equipment being ordered: BP cuff 1 Device 1     OTC products: None, except as noted above and no recent use of OTC ASA, NSAIDS or Steroids    Allergies   Allergen Reactions     Blood Transfusion Related (Informational Only) Hives     Hives with platelets. Give benadryl premedication.     Metoprolol Other (See Comments)     Pt and  report that metoprolol does not work for her and also reports feeling unwell with this medication. She has been able to tolerate atenolol, which as worked in controlling her HR.      No Clinical Screening - See Comments      Penicillin Allergy Skin Test not performed, see antimicrobial management team progress note 7/5/17.       Penicillins      Tape [Adhesive Tape] Rash      Latex Allergy: NO    Social History   Substance Use Topics     Smoking status: Never Smoker     Smokeless tobacco: Never Used     Alcohol use Yes      Comment: none     History   Drug Use No       REVIEW OF SYSTEMS:   CONSTITUTIONAL: NEGATIVE for fever, chills, change in weight  INTEGUMENTARY/SKIN: NEGATIVE for worrisome rashes, moles or lesions  EYES: NEGATIVE for vision changes or  irritation  ENT/MOUTH: NEGATIVE for ear, mouth and throat problems  RESP: NEGATIVE for significant cough or SOB  BREAST: NEGATIVE for masses, tenderness or discharge  CV: NEGATIVE for chest pain, palpitations or peripheral edema  GI: NEGATIVE for nausea, abdominal pain, heartburn, or change in bowel habits  : NEGATIVE for frequency, dysuria, or hematuria  MUSCULOSKELETAL: NEGATIVE for significant arthralgias or myalgia POSITIVE arm pain  NEURO: NEGATIVE for weakness, dizziness or paresthesias POSITIVE long standing paresthesias/neuropathy including legs and left arm  ENDOCRINE: NEGATIVE for temperature intolerance, skin/hair changes  HEME: NEGATIVE for bleeding problems  PSYCHIATRIC: NEGATIVE for changes in mood or affect    EXAM:   /73  Pulse 54  Temp 97.7  F (36.5  C) (Tympanic)  Wt 113 lb 14.4 oz (51.7 kg)  SpO2 98%  BMI 23.81 kg/m2    GENERAL APPEARANCE: healthy, alert and no distress     EYES: EOMI, PERRL     HENT: ear canals and TM's normal and nose and mouth without ulcers or lesions     NECK: no adenopathy, no asymmetry, masses, or scars and thyroid normal to palpation     RESP: lungs clear to auscultation - no rales, rhonchi or wheezes     CV: regular rates and rhythm, normal S1 S2, no S3 or S4 and no murmur, click or rub     ABDOMEN:  soft, nontender, no HSM or masses and bowel sounds normal     MS: extremities normal- no gross deformities noted, no evidence of inflammation in joints, FROM in all extremities. POSITIVE left lower arm chronic wound, wrapped     SKIN: no suspicious lesions or rashes     NEURO: Normal strength and tone, sensory exam grossly normal, mentation intact and speech normal     PSYCH: mentation appears normal. and affect normal/bright     LYMPHATICS: No cervical adenopathy    DIAGNOSTICS:     Labs Resulted Today:   Results for orders placed or performed in visit on 05/17/18   Digoxin level   Result Value Ref Range    Digoxin Level 0.7 0.5 - 2.0 ug/L   CBC with platelets  differential   Result Value Ref Range    WBC 2.4 (L) 4.0 - 11.0 10e9/L    RBC Count 4.25 3.8 - 5.2 10e12/L    Hemoglobin 15.7 11.7 - 15.7 g/dL    Hematocrit 43.0 35.0 - 47.0 %     (H) 78 - 100 fl    MCH 36.9 (H) 26.5 - 33.0 pg    MCHC 36.5 31.5 - 36.5 g/dL    RDW 12.5 10.0 - 15.0 %    Platelet Count 122 (L) 150 - 450 10e9/L    Diff Method Automated Method     % Neutrophils 54.4 %    % Lymphocytes 18.7 %    % Monocytes 23.0 %    % Eosinophils 1.3 %    % Basophils 1.3 %    % Immature Granulocytes 1.3 %    Absolute Neutrophil 1.3 (L) 1.6 - 8.3 10e9/L    Absolute Lymphocytes 0.4 (L) 0.8 - 5.3 10e9/L    Absolute Monocytes 0.5 0.0 - 1.3 10e9/L    Absolute Eosinophils 0.0 0.0 - 0.7 10e9/L    Absolute Basophils 0.0 0.0 - 0.2 10e9/L    Abs Immature Granulocytes 0.0 0 - 0.4 10e9/L    RBC Morphology Normal     Platelet Estimate Confirming automated cell count    Comprehensive metabolic panel   Result Value Ref Range    Sodium 135 133 - 144 mmol/L    Potassium 3.9 3.4 - 5.3 mmol/L    Chloride 101 94 - 109 mmol/L    Carbon Dioxide 29 20 - 32 mmol/L    Anion Gap 5 3 - 14 mmol/L    Glucose 107 (H) 70 - 99 mg/dL    Urea Nitrogen 20 7 - 30 mg/dL    Creatinine 0.81 0.52 - 1.04 mg/dL    GFR Estimate 73 >60 mL/min/1.7m2    GFR Estimate If Black 88 >60 mL/min/1.7m2    Calcium 9.8 8.5 - 10.1 mg/dL    Bilirubin Total 1.0 0.2 - 1.3 mg/dL    Albumin 4.4 3.4 - 5.0 g/dL    Protein Total 7.9 6.8 - 8.8 g/dL    Alkaline Phosphatase 204 (H) 40 - 150 U/L    ALT 46 0 - 50 U/L    AST 43 0 - 45 U/L     Labs Drawn and in Process:   Unresulted Labs Ordered in the Past 30 Days of this Admission     No orders found from 3/18/2018 to 5/18/2018.        X-ray cervical spine:  IMPRESSION: Advanced multilevel degenerative disc disease and facet  degenerative changes. Vertebral body height is normal. No acute  fracture identified. 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 Mild retrolisthesis of C3 in relation to C4. Mild anterolisthesis  of  C5 on C6.  I independently viewed the x-ray, discussed with patient, and am awaiting radiology read.     EKG not done today - done by cardiology 5/16/18    IMPRESSION:   Reason for surgery/procedure: chronic left arm wound / left arm wound excision and closure, possible submuscular transposition of ulnar nerve  Diagnosis/reason for consult: preoperative exam    The proposed surgical procedure is considered INTERMEDIATE risk.    REVISED CARDIAC RISK INDEX  The patient has the following serious cardiovascular risks for perioperative complications such as (MI, PE, VFib and 3  AV Block):  Congestive Heart Failure (pulmonary edema, PND, s3 antionette, CXR with pulmonary congestion, basilar rales)  INTERPRETATION: 1 risks: Class II (low risk - 0.9% complication rate)    The patient has the following additional risks for perioperative complications:  Significant bleeding history  Leukopenia, lymphoma  Degenerative C spine from RA  Chronic prednisone usage  Atrial flutter/fib      ICD-10-CM    1. Preop general physical exam Z01.818 CBC with platelets differential     Comprehensive metabolic panel     XR Cervical Spine 2/3 Views     hydrocortisone (CORTEF) 5 MG tablet   2. Wound of left upper extremity, sequela S41.102S CBC with platelets differential     Comprehensive metabolic panel   3. Paroxysmal atrial fibrillation (H) I48.0 Digoxin level   4. Atrial flutter, unspecified type (H) I48.92 Digoxin level     HOME CARE NURSING REFERRAL   5. Diffuse large B-cell lymphoma of extranodal site (H) C83.39    6. Essential hypertension I10    7. Rheumatoid arthritis involving multiple sites with positive rheumatoid factor (H) M05.79 XR Cervical Spine 2/3 Views     hydrocortisone (CORTEF) 5 MG tablet     HOME CARE NURSING REFERRAL   8. Lymphedema of both lower extremities [place] [facility] [place]UR[place]   9. Leukopenia, unspecified type D72.819        RECOMMENDATIONS:     --Consult hospital rounder / IM to assist  post-op medical management    Cardiovascular Risk  Patient is already on a Beta Blocker. Continue Betablocker therapy after surgery, using Beta blocker order set as necessary for NPO status.    --Patient is to take all scheduled medications on the day of surgery EXCEPT for modifications listed below.    Anticoagulant or Antiplatelet Medication Use  Per cardiology will stop Elequis 5/20/18      Patient cleared by cardiology 5/16/18 - see office encounter    Chronic Corticosteroid Use  Stress dose steroids are indicated due to chronic steroid use in last 3 months (e.g. >3 weeks of predisone 20 mg or daily prednisone 5 mg)  Hydrocortisone 25 mg day of surgery    Discussed leukopenia and thrombocytopenia with oncology, who agreed okay for surgery given post-chemo and her baseline.    APPROVAL GIVEN to proceed with proposed procedure, without further diagnostic evaluation       Note to anesthesiology: patient has significant degenerative changes in cervical spine, use caution with anesthesia/neck placement    Signed Electronically by: Gala Stringer PA-C    Copy of this evaluation report is provided to requesting physician.    [facility] Preop Guidelines    Revised Cardiac Risk Index

## 2018-05-17 NOTE — MR AVS SNAPSHOT
After Visit Summary   5/17/2018    Amelia Michel    MRN: [id number]           Patient Information     Date Of Birth          1960        Visit Information        Provider Department      5/17/2018 11:20 AM Gala Stringer [facility]-C [facility]        Today's Diagnoses     Preop general physical exam    -  1    Wound of left upper extremity, sequela        Paroxysmal atrial fibrillation (H)        Atrial flutter, unspecified type (H)        Diffuse large B-cell lymphoma of extranodal site (H)        Essential hypertension        Rheumatoid arthritis involving multiple sites with positive rheumatoid factor (H)        Lymphedema of both lower extremities          Care Instructions    Stress dose steroids: hydrocortisone 25 mg day of surgery  Take your prednisone as well    Before Your Surgery      Call your surgeon if there is any change in your health. This includes signs of a cold or flu (such as a sore throat, runny nose, cough, rash or fever).    Do not smoke, drink alcohol or take over the counter medicine (unless your surgeon or primary care doctor tells you to) for the 24 hours before and after surgery.    If you take prescribed drugs: Follow your doctor s orders about which medicines to take and which to stop until after surgery.    Eating and drinking prior to surgery: follow the instructions from your surgeon    Take a shower or bath the night before surgery. Use the soap your surgeon gave you to gently clean your skin. If you do not have soap from your surgeon, use your regular soap. Do not shave or scrub the surgery site.  Wear clean pajamas and have clean sheets on your bed.           Follow-ups after your visit        Your next 10 appointments already scheduled     May 22, 2018   Procedure with Kevin Sheldon MD   [facility], [place], Same Day Surgery (--)    [address] [address]   [phone]            May 24, 2018 12:00 PM CDT   CT CHEST ABDOMEN PELVIS W/O & W  CONTRAST with UCCT1   [facility] CT ([facility] [place])    [address]  1st Floor  [place] [id number] [phone]           Please bring any scans or X-rays taken at other hospitals, if similar tests were done. Also bring a list of your medicines, including vitamins, minerals and over-the-counter drugs. It is safest to leave personal items at home.  Be sure to tell your doctor:   If you have any allergies.   If there s any chance you are pregnant.   If you are breastfeeding.  How to prepare:   Do not eat or drink for 2 hours before your exam. If you need to take medicine, you may take it with small sips of water. (We may ask you to take liquid medicine as well.)   Please wear loose clothing, such as a sweat suit or jogging clothes. Avoid snaps, zippers and other metal. We may ask you to undress and put on a hospital gown.  Please arrive 30 minutes early for your CT. Once in the department you might be asked to drink water 15-20 minutes prior to your exam.  If indicated you may be asked to drink an oral contrast in advance of your CT.  If this is the case, the imaging team will let you know or be in contact with you prior to your appointment  Patients over 70 or patients with diabetes or kidney problems:   If you haven t had a blood test (creatinine test) within the last 30 days, the Cardiologist/Radiologist may require you to get this test prior to your exam.  If you have diabetes:   Continue to take your metformin medication on the day of your exam  If you have any questions, please call the Imaging Department where you will have your exam.            May 24, 2018  3:00 PM CDT   [facility] Draw with  [facility] DRAW   [facility] Draw ([facility] [place])    [address]  [address]  [place] [id number] [phone]            May 24, 2018  3:30 PM CDT   RETURN ONC with Lenore Rodriguez MD   [facility] Blood and Marrow  Transplant ([facility])    [address]  [address]  [place] [address]   [phone]            May 31, 2018  4:30 PM CDT   (Arrive by 4:15 PM)   Return Visit with Nayeli Pritchett MD   [facility] Rheumatology ([facility])    [address]  [address]  [place] [address]   [phone]            Jun 01, 2018  3:00 PM CDT   (Arrive by 2:45 PM)   Return Plastic Surgery with Kevin Sheldon MD   [facility] Plastic and Reconstructive Surgery ([facility])    [address]  4th Floor  [place] [address] [phone]           Do not wear perfume.            Jun 06, 2018  8:45 AM CDT   (Arrive by 8:30 AM)   Cystoscopy with Vadim Sparks MD   [facility]rology [facility] Prostate [facility] Urologic C[facility] ([facility])    [address]  4th Floor  [place] [address] [phone]            Aug 15, 2018 10:00 AM CDT   (Arrive by 9:45 AM)   RETURN ARRHYTHMIA with Juan Eaton MD   [facility] Heart Care ([facility])    [address]  [address]  [place] [address] [phone]              Future tests that were ordered for you today     Open Future Orders        Priority Expected Expires Ordered    Follow-Up with Cardiologist Routine 8/16/2018 10/21/2018 5/16/2018            Who to contact     Normal or non-critical lab and imaging results will be communicated to you by MyChart, letter or phone within 4 business days after the clinic has received the results. If you do not hear from us within 7 days, please contact the clinic through MyChart or phone. If you have a critical or abnormal lab result, we will notify you by phone as soon as possible.  Submit refill requests through [employer] or call your pharmacy and they will forward the refill request to us. Please allow 3 business days for your refill to be completed.           If you need to speak with a  for additional information , please call: [phone]             Additional Information About Your Visit        [email or web address] Information     [employer] gives you secure access to your electronic health record. If you see a primary care provider, you can also send messages to your care team and make appointments. If you have questions, please call your primary care clinic.  If you do not have a primary care provider, please call [phone] and they will assist you.        Care EveryWhere ID     This is your Care EveryWhere ID. This could be used by other organizations to access your [place] medical records  [id number]        Your Vitals Were     Pulse Temperature BMI (Body Mass Index)             54 97.7  F (36.5  C) (Tympanic) 23.81 kg/m2          Blood Pressure from Last 3 Encounters:   05/17/18 120/73   05/16/18 125/78   04/24/18 124/78    Weight from Last 3 Encounters:   05/17/18 113 lb 14.4 oz (51.7 kg)   05/16/18 113 lb 12.8 oz (51.6 kg)   04/24/18 115 lb 1.6 oz (52.2 kg)              We Performed the Following     CBC with platelets differential     Comprehensive metabolic panel     Digoxin level          Today's Medication Changes          These changes are accurate as of 5/17/18 12:27 PM.  If you have any questions, ask your nurse or doctor.               Start taking these medicines.        Dose/Directions    order for DME   Used for:  Lymphedema of both lower extremities   Started by:  Gala Stringer PA-C        Equipment being ordered: Thigh high compression stockings Class 2: off the shelf or custom, farrow foot wrap and juxta fit premium lower leg wrap, lymphedema bandaging supplies 3 sets   Quantity:  1 Units   Refills:  1         These medicines have changed or have updated prescriptions.        Dose/Directions    * HYDROCORTISONE PO   This may have changed:  Another medication with the same name was added. Make sure you understand how and when to take  each.   Changed by:  Gala Stringer PA-C        Dose:  100 mg   Take 100 mg by mouth IV   Refills:  0       * hydrocortisone 5 MG tablet   Commonly known as:  CORTEF   This may have changed:  You were already taking a medication with the same name, and this prescription was added. Make sure you understand how and when to take each.   Used for:  Rheumatoid arthritis involving multiple sites with positive rheumatoid factor (H), Preop general physical exam   Changed by:  Gala Stringer PA-C        Dose:  25 mg   Take 5 tablets (25 mg) by mouth daily for 1 day   Quantity:  5 tablet   Refills:  0       * Notice:  This list has 2 medication(s) that are the same as other medications prescribed for you. Read the directions carefully, and ask your doctor or other care provider to review them with you.         Where to get your medicines      These medications were sent to [facility] [address]  [address] [place] [facility] [address]     Phone:  [phone]     hydrocortisone 5 MG tablet         Some of these will need a paper prescription and others can be bought over the counter.  Ask your nurse if you have questions.     Bring a paper prescription for each of these medications     order for DME                Primary Care Provider Office Phone # Fax #    Comfort Sabillon [phone] [address] [facility] [address]        Equal Access to Services     CATINA HEAD AH: Kay mcguire hadasho Soomaali, waaxda luqadaha, qaybta kaalmada adeegyada, durga inman. So [facility] [phone].    ATENCIÓN: Si habla español, tiene a sarmiento disposición servicios gratuitos de asistencia lingüística. Llame al [phone].    We comply with applicable federal civil rights laws and [place] laws. We do not discriminate on the basis of race, color, national origin, age, disability, sex, sexual orientation, or gender identity.            Thank you!      Thank you for choosing [facility]  for your care. Our goal is always to provide you with excellent care. Hearing back from our patients is one way we can continue to improve our services. Please take a few minutes to complete the written survey that you may receive in the mail after your visit with us. Thank you!             Your Updated Medication List - Protect others around you: Learn how to safely use, store and throw away your medicines at www.disposemymeds.org.          This list is accurate as of 5/17/18 12:27 PM.  Always use your most recent med list.                   Brand Name Dispense Instructions for use Diagnosis    acetaminophen 325 MG tablet    TYLENOL     Take 2 tablets (650 mg) by mouth every 4 hours as needed for mild pain, fever or headaches    Diffuse large B-cell lymphoma of extranodal site (H)       apixaban ANTICOAGULANT 5 MG tablet    ELIQUIS    180 tablet    Take 1 tablet (5 mg) by mouth 2 times daily    Paroxysmal atrial fibrillation (H)       ascorbic acid 500 MG Tabs     30 tablet    Take 1 tablet (500 mg) by mouth daily    Diffuse large B-cell lymphoma, unspecified body region (H)       atenolol 25 MG tablet    TENORMIN    60 tablet    Take 1 tablet (25 mg) by mouth 2 times daily    Atrial tachycardia (H)       calcium polycarbophil 625 MG tablet    FIBERCON     Take 1 tablet by mouth 2 times daily        calcium-vitamin D 600-400 MG-UNIT per tablet    CALTRATE    60 tablet    Take 2 tablets by mouth every morning    Diffuse large B-cell lymphoma, unspecified body region (H)       colchicine 0.6 MG tablet    COLCYRS    60 tablet    Take 1 tablet (0.6 mg) by mouth daily    Pericarditis       digoxin 125 MCG tablet    LANOXIN    90 tablet    Take 250mcg daily for 2 days, then decrease to 125mcg daily    Atrial flutter, unspecified type (H)       furosemide 20 MG tablet    LASIX    90 tablet    Take 1 tablet (20 mg) by mouth daily    Chronic systolic heart failure (H)        gabapentin 100 MG capsule    NEURONTIN    180 capsule    TAKE TWO CAPSULES BY MOUTH THREE TIMES A DAY    Critical illness myopathy       * HYDROCORTISONE PO      Take 100 mg by mouth IV        * hydrocortisone 5 MG tablet    CORTEF    5 tablet    Take 5 tablets (25 mg) by mouth daily for 1 day    Rheumatoid arthritis involving multiple sites with positive rheumatoid factor (H), Preop general physical exam       hydroxychloroquine 200 MG tablet    PLAQUENIL    60 tablet    Take 1 tablet (200 mg) by mouth 2 times daily    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 (H)       multivitamin, therapeutic with minerals Tabs tablet     30 each    Take 1 tablet by mouth daily    Diffuse large B-cell lymphoma, unspecified body region (H)       order for DME     1 Device    Equipment being ordered: BP cuff    Hypertension, unspecified type       order for DME     1 Units    Equipment being ordered: Thigh high compression stockings Class 2: off the shelf or custom, farrow foot wrap and juxta fit premium lower leg wrap, lymphedema bandaging supplies 3 sets    Lymphedema of both lower extremities       predniSONE 5 MG tablet    DELTASONE    90 tablet    Take 1 tablet (5 mg) by mouth daily    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 (H)       spironolactone 25 MG tablet    ALDACTONE    30 tablet    Take 1 tablet (25 mg) by mouth daily    Acute on chronic diastolic heart failure (H)       tolterodine 4 MG 24 hr capsule    DETROL LA    30 capsule    Take 1 capsule (4 mg) by mouth daily    Urinary urgency, Urge incontinence       * Notice:  This list has 2 medication(s) that are the same as other medications prescribed for you. Read the directions carefully, and ask your doctor or other care provider to review them with you.

## 2018-05-17 NOTE — PATIENT INSTRUCTIONS
Stress dose steroids: hydrocortisone 25 mg day of surgery  Take your prednisone as well    Before Your Surgery      Call your surgeon if there is any change in your health. This includes signs of a cold or flu (such as a sore throat, runny nose, cough, rash or fever).    Do not smoke, drink alcohol or take over the counter medicine (unless your surgeon or primary care doctor tells you to) for the 24 hours before and after surgery.    If you take prescribed drugs: Follow your doctor s orders about which medicines to take and which to stop until after surgery.    Eating and drinking prior to surgery: follow the instructions from your surgeon    Take a shower or bath the night before surgery. Use the soap your surgeon gave you to gently clean your skin. If you do not have soap from your surgeon, use your regular soap. Do not shave or scrub the surgery site.  Wear clean pajamas and have clean sheets on your bed.

## 2018-05-17 NOTE — TELEPHONE ENCOUNTER
----- Message from Archana Green PA-C sent at 5/17/2018  1:43 PM CDT -----  Melvin Burgos,    I just approved a year's worth of refills for Detrol on 5/14/18. You can see it under her medications in her chart.    If I remember, the Myrbetriq was too expensive or not working? So she should just stick with Detrol.    Thanks!  Archana Green PA-C  ----- Message -----     From: Heather Ramirez LPN     Sent: 5/17/2018   1:41 PM       To: Archana Green PA-C    She is asking for refill on detrol  She has been on myrbetriq can I change back to detrol or take both??? heather

## 2018-05-17 NOTE — PROGRESS NOTES
Spoke with patient today. She states Dr. Doshi (cardiologist) recommends that patient re-start on Eliquis within 4 days post op from arm surgery with Dr. Sheldon.  This message was routed to Dr. Sheldon.

## 2018-05-18 ENCOUNTER — TELEPHONE (OUTPATIENT)
Dept: FAMILY MEDICINE | Facility: CLINIC | Age: 58
End: 2018-05-18

## 2018-05-18 DIAGNOSIS — I10 ESSENTIAL HYPERTENSION: ICD-10-CM

## 2018-05-18 DIAGNOSIS — I89.0 LYMPHEDEMA OF BOTH LOWER EXTREMITIES: ICD-10-CM

## 2018-05-18 DIAGNOSIS — C83.30 DIFFUSE LARGE B-CELL LYMPHOMA, UNSPECIFIED BODY REGION (H): ICD-10-CM

## 2018-05-18 DIAGNOSIS — I27.0 PRIMARY PULMONARY HYPERTENSION (H): Primary | ICD-10-CM

## 2018-05-18 NOTE — TELEPHONE ENCOUNTER
Alyssa from [facility] is calling.  She would really like these orders by 3:30 pm today.  She is being seen at that time and they need orders prior to going out.  Thank you..Kellee Taylor

## 2018-05-18 NOTE — TELEPHONE ENCOUNTER
Reason for Call: Request for an order or referral:    Order or referral being requested: Alyssa requesting [facility] orders for:    Skilled Nursing once weekly.    Received referral, but also needed these orders.  Thank you..Kellee Taylor    Date needed: as soon as possible    Has the patient been seen by the PCP for this problem? YES    Phone number Patient can be reached at:  Other phone number:  [phone]  Best Time:  Any time    Can we leave a detailed message on this number?  YES    Call taken on 5/18/2018 at 9:05 AM by Kellee Taylor

## 2018-05-21 ENCOUNTER — ANESTHESIA EVENT (OUTPATIENT)
Dept: SURGERY | Facility: CLINIC | Age: 58
End: 2018-05-21
Payer: COMMERCIAL

## 2018-05-22 ENCOUNTER — HOSPITAL ENCOUNTER (OUTPATIENT)
Facility: CLINIC | Age: 58
Discharge: HOME | End: 2018-05-22
Attending: PLASTIC SURGERY | Admitting: PLASTIC SURGERY
Payer: COMMERCIAL

## 2018-05-22 ENCOUNTER — ANESTHESIA (OUTPATIENT)
Dept: SURGERY | Facility: CLINIC | Age: 58
End: 2018-05-22
Payer: COMMERCIAL

## 2018-05-22 VITALS
TEMPERATURE: 98 F | RESPIRATION RATE: 16 BRPM | HEIGHT: 58 IN | DIASTOLIC BLOOD PRESSURE: 74 MMHG | SYSTOLIC BLOOD PRESSURE: 118 MMHG | OXYGEN SATURATION: 99 % | WEIGHT: 113.54 LBS | BODY MASS INDEX: 23.83 KG/M2

## 2018-05-22 DIAGNOSIS — S41.102S WOUND OF LEFT UPPER EXTREMITY, SEQUELA: Primary | ICD-10-CM

## 2018-05-22 LAB
GLUCOSE BLDC GLUCOMTR-MCNC: 128 MG/DL (ref 70–99)
HGB BLD-MCNC: 15.1 G/DL (ref 11.7–15.7)
POTASSIUM SERPL-SCNC: 4.1 MMOL/L (ref 3.4–5.3)

## 2018-05-22 PROCEDURE — 25000128 H RX IP 250 OP 636: Performed by: NURSE ANESTHETIST, CERTIFIED REGISTERED

## 2018-05-22 PROCEDURE — 25000128 H RX IP 250 OP 636: Performed by: STUDENT IN AN ORGANIZED HEALTH CARE EDUCATION/TRAINING PROGRAM

## 2018-05-22 PROCEDURE — 88305 TISSUE EXAM BY PATHOLOGIST: CPT | Performed by: PLASTIC SURGERY

## 2018-05-22 PROCEDURE — 25000125 ZZHC RX 250: Performed by: PLASTIC SURGERY

## 2018-05-22 PROCEDURE — 85018 HEMOGLOBIN: CPT | Performed by: ANESTHESIOLOGY

## 2018-05-22 PROCEDURE — 36415 COLL VENOUS BLD VENIPUNCTURE: CPT | Performed by: ANESTHESIOLOGY

## 2018-05-22 PROCEDURE — 25000125 ZZHC RX 250: Performed by: NURSE ANESTHETIST, CERTIFIED REGISTERED

## 2018-05-22 PROCEDURE — 36000057 ZZH SURGERY LEVEL 3 1ST 30 MIN - UMMC: Performed by: PLASTIC SURGERY

## 2018-05-22 PROCEDURE — 36000059 ZZH SURGERY LEVEL 3 EA 15 ADDTL MIN UMMC: Performed by: PLASTIC SURGERY

## 2018-05-22 PROCEDURE — 37000008 ZZH ANESTHESIA TECHNICAL FEE, 1ST 30 MIN: Performed by: PLASTIC SURGERY

## 2018-05-22 PROCEDURE — 84132 ASSAY OF SERUM POTASSIUM: CPT | Performed by: ANESTHESIOLOGY

## 2018-05-22 PROCEDURE — 27210794 ZZH OR GENERAL SUPPLY STERILE: Performed by: PLASTIC SURGERY

## 2018-05-22 PROCEDURE — 82962 GLUCOSE BLOOD TEST: CPT

## 2018-05-22 PROCEDURE — 37000009 ZZH ANESTHESIA TECHNICAL FEE, EACH ADDTL 15 MIN: Performed by: PLASTIC SURGERY

## 2018-05-22 PROCEDURE — 71000027 ZZH RECOVERY PHASE 2 EACH 15 MINS: Performed by: PLASTIC SURGERY

## 2018-05-22 PROCEDURE — 40000171 ZZH STATISTIC PRE-PROCEDURE ASSESSMENT III: Performed by: PLASTIC SURGERY

## 2018-05-22 RX ORDER — CLINDAMYCIN PHOSPHATE 900 MG/50ML
900 INJECTION, SOLUTION INTRAVENOUS
Status: COMPLETED | OUTPATIENT
Start: 2018-05-22 | End: 2018-05-22

## 2018-05-22 RX ORDER — ACETAMINOPHEN 325 MG/1
650 TABLET ORAL
Status: DISCONTINUED | OUTPATIENT
Start: 2018-05-22 | End: 2018-05-22 | Stop reason: HOSPADM

## 2018-05-22 RX ORDER — EPHEDRINE SULFATE 50 MG/ML
INJECTION, SOLUTION INTRAMUSCULAR; INTRAVENOUS; SUBCUTANEOUS PRN
Status: DISCONTINUED | OUTPATIENT
Start: 2018-05-22 | End: 2018-05-22

## 2018-05-22 RX ORDER — SULFAMETHOXAZOLE/TRIMETHOPRIM 800-160 MG
1 TABLET ORAL 2 TIMES DAILY
Qty: 14 TABLET | Refills: 0 | Status: SHIPPED | OUTPATIENT
Start: 2018-05-22 | End: 2018-06-06

## 2018-05-22 RX ORDER — OXYCODONE HYDROCHLORIDE 5 MG/1
5 TABLET ORAL
Status: DISCONTINUED | OUTPATIENT
Start: 2018-05-22 | End: 2018-05-22 | Stop reason: HOSPADM

## 2018-05-22 RX ORDER — ONDANSETRON 2 MG/ML
INJECTION INTRAMUSCULAR; INTRAVENOUS PRN
Status: DISCONTINUED | OUTPATIENT
Start: 2018-05-22 | End: 2018-05-22

## 2018-05-22 RX ORDER — PROPOFOL 10 MG/ML
INJECTION, EMULSION INTRAVENOUS CONTINUOUS PRN
Status: DISCONTINUED | OUTPATIENT
Start: 2018-05-22 | End: 2018-05-22

## 2018-05-22 RX ORDER — OXYCODONE HYDROCHLORIDE 5 MG/1
5-10 TABLET ORAL EVERY 4 HOURS PRN
Qty: 30 TABLET | Refills: 0 | Status: SHIPPED | OUTPATIENT
Start: 2018-05-22 | End: 2018-06-06

## 2018-05-22 RX ORDER — NALOXONE HYDROCHLORIDE 0.4 MG/ML
.1-.4 INJECTION, SOLUTION INTRAMUSCULAR; INTRAVENOUS; SUBCUTANEOUS
Status: DISCONTINUED | OUTPATIENT
Start: 2018-05-22 | End: 2018-05-22 | Stop reason: HOSPADM

## 2018-05-22 RX ORDER — ONDANSETRON 4 MG/1
4 TABLET, ORALLY DISINTEGRATING ORAL EVERY 30 MIN PRN
Status: DISCONTINUED | OUTPATIENT
Start: 2018-05-22 | End: 2018-05-22 | Stop reason: HOSPADM

## 2018-05-22 RX ORDER — SODIUM CHLORIDE, SODIUM LACTATE, POTASSIUM CHLORIDE, CALCIUM CHLORIDE 600; 310; 30; 20 MG/100ML; MG/100ML; MG/100ML; MG/100ML
INJECTION, SOLUTION INTRAVENOUS CONTINUOUS PRN
Status: DISCONTINUED | OUTPATIENT
Start: 2018-05-22 | End: 2018-05-22

## 2018-05-22 RX ORDER — CLINDAMYCIN PHOSPHATE 900 MG/50ML
900 INJECTION, SOLUTION INTRAVENOUS SEE ADMIN INSTRUCTIONS
Status: DISCONTINUED | OUTPATIENT
Start: 2018-05-22 | End: 2018-05-22 | Stop reason: HOSPADM

## 2018-05-22 RX ORDER — ONDANSETRON 2 MG/ML
4 INJECTION INTRAMUSCULAR; INTRAVENOUS EVERY 30 MIN PRN
Status: DISCONTINUED | OUTPATIENT
Start: 2018-05-22 | End: 2018-05-22 | Stop reason: HOSPADM

## 2018-05-22 RX ORDER — FLUMAZENIL 0.1 MG/ML
0.2 INJECTION, SOLUTION INTRAVENOUS
Status: DISCONTINUED | OUTPATIENT
Start: 2018-05-22 | End: 2018-05-22 | Stop reason: HOSPADM

## 2018-05-22 RX ORDER — ONDANSETRON 4 MG/1
4 TABLET, ORALLY DISINTEGRATING ORAL
Status: DISCONTINUED | OUTPATIENT
Start: 2018-05-22 | End: 2018-05-22 | Stop reason: HOSPADM

## 2018-05-22 RX ORDER — LIDOCAINE HYDROCHLORIDE 20 MG/ML
INJECTION, SOLUTION INFILTRATION; PERINEURAL PRN
Status: DISCONTINUED | OUTPATIENT
Start: 2018-05-22 | End: 2018-05-22

## 2018-05-22 RX ORDER — FENTANYL CITRATE 50 UG/ML
25-50 INJECTION, SOLUTION INTRAMUSCULAR; INTRAVENOUS
Status: DISCONTINUED | OUTPATIENT
Start: 2018-05-22 | End: 2018-05-22 | Stop reason: HOSPADM

## 2018-05-22 RX ORDER — SODIUM CHLORIDE, SODIUM LACTATE, POTASSIUM CHLORIDE, CALCIUM CHLORIDE 600; 310; 30; 20 MG/100ML; MG/100ML; MG/100ML; MG/100ML
500 INJECTION, SOLUTION INTRAVENOUS CONTINUOUS
Status: DISCONTINUED | OUTPATIENT
Start: 2018-05-22 | End: 2018-05-22 | Stop reason: HOSPADM

## 2018-05-22 RX ORDER — SODIUM CHLORIDE, SODIUM LACTATE, POTASSIUM CHLORIDE, CALCIUM CHLORIDE 600; 310; 30; 20 MG/100ML; MG/100ML; MG/100ML; MG/100ML
INJECTION, SOLUTION INTRAVENOUS CONTINUOUS
Status: DISCONTINUED | OUTPATIENT
Start: 2018-05-22 | End: 2018-05-22 | Stop reason: HOSPADM

## 2018-05-22 RX ORDER — MEPERIDINE HYDROCHLORIDE 25 MG/ML
12.5 INJECTION INTRAMUSCULAR; INTRAVENOUS; SUBCUTANEOUS
Status: DISCONTINUED | OUTPATIENT
Start: 2018-05-22 | End: 2018-05-22 | Stop reason: HOSPADM

## 2018-05-22 RX ADMIN — FENTANYL CITRATE 50 MCG: 50 INJECTION INTRAMUSCULAR; INTRAVENOUS at 11:56

## 2018-05-22 RX ADMIN — PHENYLEPHRINE HYDROCHLORIDE 0.3 MCG/KG/MIN: 10 INJECTION, SOLUTION INTRAMUSCULAR; INTRAVENOUS; SUBCUTANEOUS at 12:40

## 2018-05-22 RX ADMIN — Medication 10 MG: at 12:39

## 2018-05-22 RX ADMIN — MEPIVACAINE HYDROCHLORIDE 20 ML: 20 INJECTION, SOLUTION EPIDURAL; INFILTRATION at 11:47

## 2018-05-22 RX ADMIN — Medication 10 MG: at 12:41

## 2018-05-22 RX ADMIN — PHENYLEPHRINE HYDROCHLORIDE 200 MCG: 10 INJECTION, SOLUTION INTRAMUSCULAR; INTRAVENOUS; SUBCUTANEOUS at 12:39

## 2018-05-22 RX ADMIN — PROPOFOL 50 MCG/KG/MIN: 10 INJECTION, EMULSION INTRAVENOUS at 12:30

## 2018-05-22 RX ADMIN — SODIUM CHLORIDE, POTASSIUM CHLORIDE, SODIUM LACTATE AND CALCIUM CHLORIDE: 600; 310; 30; 20 INJECTION, SOLUTION INTRAVENOUS at 12:04

## 2018-05-22 RX ADMIN — MIDAZOLAM HYDROCHLORIDE 1 MG: 1 INJECTION, SOLUTION INTRAMUSCULAR; INTRAVENOUS at 11:57

## 2018-05-22 RX ADMIN — ONDANSETRON 4 MG: 2 INJECTION INTRAMUSCULAR; INTRAVENOUS at 12:45

## 2018-05-22 RX ADMIN — CLINDAMYCIN PHOSPHATE 900 MG: 18 INJECTION, SOLUTION INTRAVENOUS at 12:30

## 2018-05-22 RX ADMIN — PHENYLEPHRINE HYDROCHLORIDE 200 MCG: 10 INJECTION, SOLUTION INTRAMUSCULAR; INTRAVENOUS; SUBCUTANEOUS at 12:41

## 2018-05-22 RX ADMIN — PHENYLEPHRINE HYDROCHLORIDE 100 MCG: 10 INJECTION, SOLUTION INTRAMUSCULAR; INTRAVENOUS; SUBCUTANEOUS at 12:35

## 2018-05-22 RX ADMIN — LIDOCAINE HYDROCHLORIDE 40 MG: 20 INJECTION, SOLUTION INFILTRATION; PERINEURAL at 12:28

## 2018-05-22 ASSESSMENT — ENCOUNTER SYMPTOMS: DYSRHYTHMIAS: 1

## 2018-05-22 NOTE — ANESTHESIA PREPROCEDURE EVALUATION
Anesthesia Evaluation     . Pt has had prior anesthetic. Type: MAC    History of anesthetic complications   - PONV        ROS/MED HX    ENT/Pulmonary:  - neg pulmonary ROS     Neurologic:  - neg neurologic ROS     Cardiovascular:     (+) hypertension----. Taking blood thinners : . CHF etiology: diastolic . . :. dysrhythmias a-fib, . congenital heart disease VSD s/p closure:, pulmonary hypertension, Previous cardiac testing [place]ch[place]:9/2017results:Left ventricular function, chamber size, wall motion, and wall thickness are  normal.The EF is 60-65% (traced 60%.) GLS -18%.  The right ventricle is normal size and normal in function. The RV is mildly  dilated.  Moderate tricuspid insufficiency is present.  Mild pulmonary hypertension is present (esimated PASP 55 mmHg including RA  pressure.)  Dilation of the inferior vena cava is present with abnormal respiratory  variation in diameter (esitimated RAP 15 mmHg.)  This study was compared with the study from 8/31/17: TR appears slightly  decreased from the earlier study.date: results: date: results: date: results:          METS/Exercise Tolerance:     Hematologic:     (+) Anemia, -      Musculoskeletal:         GI/Hepatic:  - neg GI/hepatic ROS       Renal/Genitourinary:  - ROS Renal section negative       Endo:  - neg endo ROS       Psychiatric:  - neg psychiatric ROS       Infectious Disease:  - neg infectious disease ROS       Malignancy:   (+) Malignancy History of Other  Other CA B-cell lymphoma Remission status post Chemo         Other:                   ANESTHESIA PREOP EVALUATION    Procedure: Procedure(s):  Left Arm Wound Excision And Closure, Possible Submuscular Transposition of Ulnar Nerve  (Choice Anesthesia) - Wound Class: I-Clean   - Wound Class: II-Clean Contaminated    HPI: Amelia Michel is a 57 year old female     PMHx/PSHx/ROS:  Past Medical History:   Diagnosis Date     Antiplatelet or antithrombotic long-term use      Arrhythmia      Arthritis       Cancer (H)     lymphoma     Cardiac arrest (H)      History of chemotherapy      Hypertension      Lymphoma (H)      Neuropathy      PONV (postoperative nausea and vomiting)      Rheumatoid arthritis(714.0)        Past Surgical History:   Procedure Laterality Date     ANESTHESIA CARDIOVERSION N/A 5/17/2017    Procedure: ANESTHESIA CARDIOVERSION;  ANESTHESIA CARDIOVERSION;  Surgeon: GENERIC ANESTHESIA PROVIDER;  Location: [facility] OR     ANESTHESIA CARDIOVERSION  3/12/2018    Procedure: ANESTHESIA CARDIOVERSION;;  Surgeon: GENERIC ANESTHESIA PROVIDER;  Location: [facility] OR     ANESTHESIA CARDIOVERSION N/A 3/23/2018    Procedure: ANESTHESIA CARDIOVERSION;  Anesthesia Cardioversion ;  Surgeon: GENERIC ANESTHESIA PROVIDER;  Location: [facility] OR     ANESTHESIA CARDIOVERSION N/A 4/12/2018    Procedure: ANESTHESIA CARDIOVERSION;  Cardioversion;  Surgeon: GENERIC ANESTHESIA PROVIDER;  Location: [facility] OR     ARTHRODESIS WRIST  2000    Right wrist     BONE MARROW ASPIRATE &BIOPSY  7/12/2017          FOOT SURGERY      4 left and 2 right     RELEASE CARPAL TUNNEL BILATERAL       REPAIR VENTRICULAR SEPTAL DEFECT  1969         Past Anes Hx: No personal or family h/o anesthesia problems    Soc Hx:   Social History   Substance Use Topics     Smoking status: Never Smoker     Smokeless tobacco: Never Used     Alcohol use Yes      Comment: none       Allergies:   Allergies   Allergen Reactions     Blood Transfusion Related (Informational Only) Hives     Hives with platelets. Give benadryl premedication.     Metoprolol Other (See Comments)     Pt and  report that metoprolol does not work for her and also reports feeling unwell with this medication. She has been able to tolerate atenolol, which as worked in controlling her HR.      No Clinical Screening - See Comments      Penicillin Allergy Skin Test not performed, see antimicrobial management team progress note 7/5/17.       Penicillins      Tape [Adhesive Tape] Rash       Meds:   No  prescriptions prior to admission.       Current Outpatient Prescriptions   Medication Sig Dispense Refill     acetaminophen (TYLENOL) 325 MG tablet Take 2 tablets (650 mg) by mouth every 4 hours as needed for mild pain, fever or headaches       ascorbic acid 500 MG TABS Take 1 tablet (500 mg) by mouth daily 30 tablet 0     atenolol (TENORMIN) 25 MG tablet Take 1 tablet (25 mg) by mouth 2 times daily 60 tablet 3     calcium-vitamin D (CALTRATE) 600-400 MG-UNIT per tablet Take 2 tablets by mouth every morning 60 tablet 0     colchicine 0.6 MG tablet Take 1 tablet (0.6 mg) by mouth daily 60 tablet 0     digoxin (LANOXIN) 125 MCG tablet Take 250mcg daily for 2 days, then decrease to 125mcg daily 90 tablet 3     furosemide (LASIX) 20 MG tablet Take 1 tablet (20 mg) by mouth daily 90 tablet 3     gabapentin (NEURONTIN) 100 MG capsule TAKE TWO CAPSULES BY MOUTH THREE TIMES A  capsule 3     hydroxychloroquine (PLAQUENIL) 200 MG tablet Take 1 tablet (200 mg) by mouth 2 times daily 60 tablet 5     multivitamin, therapeutic with minerals (THERA-VIT-M) TABS tablet Take 1 tablet by mouth daily 30 each 0     predniSONE (DELTASONE) 5 MG tablet Take 1 tablet (5 mg) by mouth daily 90 tablet 2     spironolactone (ALDACTONE) 25 MG tablet Take 1 tablet (25 mg) by mouth daily 30 tablet 3     tolterodine (DETROL LA) 4 MG 24 hr capsule Take 1 capsule (4 mg) by mouth daily 30 capsule 11     apixaban ANTICOAGULANT (ELIQUIS) 5 MG tablet Take 1 tablet (5 mg) by mouth 2 times daily 180 tablet 3     calcium polycarbophil (FIBERCON) 625 MG tablet Take 1 tablet by mouth 2 times daily       HYDROCORTISONE PO Take 100 mg by mouth IV       order for DME Equipment being ordered: BP cuff 1 Device 1     order for DME Equipment being ordered: Thigh high compression stockings Class 2: off the shelf or custom, farrow foot wrap and juxta fit premium lower leg wrap, lymphedema bandaging supplies 3 sets 1 Units 1       Physical Exam:  VS: T Data  Unavailable, P Data Unavailable, BP Data Unavailable, R Data Unavailable, SpO2  , Weight   Wt Readings from Last 2 Encounters:   05/17/18 51.7 kg (113 lb 14.4 oz)   05/16/18 51.6 kg (113 lb 12.8 oz)       Labs:  UPT: No results found for: HCGQUANT      BMP:  Recent Labs   Lab Test  05/17/18   1225   NA  135   POTASSIUM  3.9   CHLORIDE  101   CO2  29   BUN  20   CR  0.81   GLC  107*   VIKTORIYA  9.8     CBC:   Recent Labs   Lab Test  05/17/18   1225   WBC  2.4*   RBC  4.25   HGB  15.7   HCT  43.0   MCV  101*   MCH  36.9*   MCHC  36.5   RDW  12.5   PLT  122*     Coags:  Recent Labs   Lab Test  03/07/18   1621   08/07/17   0541   INR  3.42*   < >  1.21*   PTT   --    --   30   FIBR   --    --   248    < > = values in this interval not displayed.       Assessment/Plan:  - ASA 3  - GETA vs MAC with regional with standard ASA monitors  - PIV  - Antibiotics per surgery  - PONV prophylaxis  - Blood products available, possible administration discussed with patient  - Relevant risks, benefits, alternatives and the anesthetic plan were discussed with patient/family or family representative.  All questions were answered and there was agreement to proceed.      Yana Loaz MD    5/22/2018  8:33 AM      Physical Exam  Normal systems: pulmonary and dental    Airway   Mallampati: III  TM distance: >3 FB  Neck ROM: full    Dental     Cardiovascular   Rhythm and rate: regular      Pulmonary    breath sounds clear to auscultation                    Anesthesia Plan      History & Physical Review  History and physical reviewed and following examination; no interval change.    ASA Status:  3 .    NPO Status:  > 2 hours    Plan for MAC and IV Regional Anesthesia with Propofol induction. Maintenance will be Balanced.  Reason for MAC:  Deep or markedly invasive procedure (G8)  PONV prophylaxis:  Ondansetron (or other 5HT-3) and Other (See comment) (propofol infusion)       Postoperative Care  Postoperative pain management:  Peripheral nerve  block (Single Shot) and Oral pain medications.      Consents  Anesthetic plan, risks, benefits and alternatives discussed with:  Patient..                          .

## 2018-05-22 NOTE — OR NURSING
Patient arrived with left arm numb from nerve block, unable to move fingers. 2+pulses to radial pulse.Color matches right hand. Ace wrap in place dry and Intact. Sling applied before getting up out of bed and arm elevated. At discharge patient can move fingers and hand, dull sensation present. Instructed to use caution with arm and to use sling until sensation returns.

## 2018-05-22 NOTE — BRIEF OP NOTE
[facility], [place]    Brief Operative Note    Pre-operative diagnosis: Chronic Wound   Post-operative diagnosis * No post-op diagnosis entered *  Procedure: Procedure(s):  Left Arm Wound Excision And Closure, Possible Submuscular Transposition of Ulnar Nerve  (Choice Anesthesia) - Wound Class: I-Clean   - Wound Class: II-Clean Contaminated  Surgeon: Surgeon(s) and Role:     * Kevin Sheldon MD - Primary     * Daisha Mcelroy MD - Resident - Assisting  Anesthesia: Other   Estimated blood loss: 10 mL  Drains: Leonel-Alex  Specimens:   ID Type Source Tests Collected by Time Destination   A : left arm chronic wound fat necorsis, two short stitches proximal and one long radial Tissue Arm, Left SURGICAL PATHOLOGY EXAM Kevin Sheldon MD 5/22/2018  1:51 PM      Findings:   Fat necrosis with draining sinus  Complications: None.  Implants: None.

## 2018-05-22 NOTE — IP AVS SNAPSHOT
MRN:[id number]                      After Visit Summary   5/22/2018    Amelia Michel    MRN: [id number]           Thank you!     Thank you for choosing [facility] for your care. Our goal is always to provide you with excellent care. Hearing back from our patients is one way we can continue to improve our services. Please take a few minutes to complete the written survey that you may receive in the mail after you visit with us. Thank you!        Patient Information     Date Of Birth          1960        About your hospital stay     You were admitted on:  May 22, 2018 You last received care in the:  Same Day Surgery [facility]    You were discharged on:  May 22, 2018       Who to Call     For medical emergencies, please call 911.  For non-urgent questions about your medical care, please call your primary care provider or clinic, [phone]  For questions related to your surgery, please call your surgery clinic        Attending Provider     Provider Specialty    Kevin Sheldon MD Plastic Surgery       Primary Care Provider Office Phone # Fax #    Gala Stringer PA-C [phone]      After Care Instructions     Discharge Instructions       Check with Provider when to start anticoagulants: Restart Eliquis on Fri 5/25/2018.            Discharge Instructions       Resume pre procedure diet            Discharge Instructions       No lifting with LUE until seen in clinic. LUE weight bearing as tolerated.            Discharge Instructions       Leave LUE dressing clean and dry. May remove in 48 hours to shower, then re wrap with ace bandage for light compression. Strip and record drain output at least twice daily.            Discharge Instructions       Follow up with Surgeon in 1 week or on Thu 5/24. Call clinic with any problems.                  Your next 10 appointments already scheduled     May 24, 2018 12:00 PM CDT   CT CHEST ABDOMEN PELVIS W/O & W CONTRAST with UCCT1   M  [facility] CT ([facility] [place])    [address]  1st Floor  [facility] [address] [phone]           Please bring any scans or X-rays taken at other hospitals, if similar tests were done. Also bring a list of your medicines, including vitamins, minerals and over-the-counter drugs. It is safest to leave personal items at home.  Be sure to tell your doctor:   If you have any allergies.   If there s any chance you are pregnant.   If you are breastfeeding.  How to prepare:   Do not eat or drink for 2 hours before your exam. If you need to take medicine, you may take it with small sips of water. (We may ask you to take liquid medicine as well.)   Please wear loose clothing, such as a sweat suit or jogging clothes. Avoid snaps, zippers and other metal. We may ask you to undress and put on a hospital gown.  Please arrive 30 minutes early for your CT. Once in the department you might be asked to drink water 15-20 minutes prior to your exam.  If indicated you may be asked to drink an oral contrast in advance of your CT.  If this is the case, the imaging team will let you know or be in contact with you prior to your appointment  Patients over 70 or patients with diabetes or kidney problems:   If you haven t had a blood test (creatinine test) within the last 30 days, the Cardiologist/Radiologist may require you to get this test prior to your exam.  If you have diabetes:   Continue to take your metformin medication on the day of your exam  If you have any questions, please call the Imaging Department where you will have your exam.            May 24, 2018  3:00 PM CDT   [facility] with  [facility]   [facility] Draw ([facility])    [address]  [address]  [place] [address] [phone]            May 24, 2018  3:30 PM CDT   RETURN ONC with Lenore Rodriguez MD   [facility] Blood and Marrow Transplant ([facility]  [facility])    [address]  [address]  [place] [address]   [phone]            May 31, 2018  4:30 PM CDT   (Arrive by 4:15 PM)   Return Visit with Nayeli Pritchett MD   [facility] Rheumatology ([facility])    [address]  [address]  [place] [address]   [phone]            Jun 01, 2018  3:00 PM CDT   (Arrive by 2:45 PM)   Return Plastic Surgery with Kevin Sheldon MD   [facility] Plastic and Reconstructive Surgery ([facility])    [address]  4th Floor  [place] [address] [phone]           Do not wear perfume.            Jun 06, 2018  8:45 AM CDT   (Arrive by 8:30 AM)   Cystoscopy with Vadim Sparks MD   [facility] Urology and Inst for Prostate and Urologic Cancers ([facility])    [address]  4th Floor  [place] [address] [phone]            Aug 15, 2018 10:00 AM CDT   (Arrive by 9:45 AM)   RETURN ARRHYTHMIA with Juan Eaton MD   [facility] Heart Care ([facility])    [address]  [address]  [place] [address] [phone]              Further instructions from your care team       [facility]  Same-Day Surgery   Adult Discharge Orders & Instructions     For 24 hours after surgery    1. Get plenty of rest.  A responsible adult must stay with you for at least 24 hours after you leave the hospital.   2. Do not drive or use heavy equipment.  If you have weakness or tingling, don't drive or use heavy equipment until this feeling goes away.  3. Do not drink alcohol.  4. Avoid strenuous or risky activities.  Ask for help when climbing stairs.   5. You may feel lightheaded.  IF so, sit for a few minutes before standing.  Have someone help you get up.   6. If you have nausea (feel sick to your stomach): Drink only clear liquids such as apple juice, ginger ale,  broth or 7-Up.  Rest may also help.  Be sure to drink enough fluids.  Move to a regular diet as you feel able.  7. You may have a slight fever. Call the doctor if your fever is over 100 F (37.7 C) (taken under the tongue) or lasts longer than 24 hours.  8. You may have a dry mouth, a sore throat, muscle aches or trouble sleeping.  These should go away after 24 hours.  9. Do not make important or legal decisions.   Call your doctor for any of the followin.  Signs of infection (fever, growing tenderness at the surgery site, a large amount of drainage or bleeding, severe pain, foul-smelling drainage, redness, swelling).    2. It has been over 8 to 10 hours since surgery and you are still not able to urinate (pass water).    3.  Headache for over 24 hours.    4.  Numbness, tingling or weakness the day after surgery (if you had spinal anesthesia).  To contact a doctor, call  Dr. Sheldon at [phone]______ or:    X   [phone] and ask for the resident on call for   ________Plastic Surgery________ (answered 24 hours a day)  X  x   Emergency Department:    [facility]: [phone]           Tips for taking pain medications  To get the best pain relief possible , remember these points:      Take pain medications as directed, before pain becomes severe      Pain medication can upset your stomach: taking it with food may help      Constipation is a common side effect of pain medication. Drink plenty of  Fluids      Eat foods high in fiber. Take a stool softener  if recommended by your doctor or  Pharmacist.        Do not drink alcohol, drive or operate machinery while taking pain medications.      Ask about other ways to control pain, such as with heat, ice or relaxation.    Anesthesia: Regional Anesthesia    You re scheduled for surgery. During surgery, you ll receive medicine called anesthesia to keep you comfortable and pain-free. Your surgeon has decided that you ll receive regional anesthesia. This sheet  tells you what to expect with this type of anesthesia.  What is regional anesthesia?  Regional anesthesia numbs one region of your body. The anesthesia may be given around nerves or into veins in your arms, neck, or legs (nerve block or Moreno block). Or it may be sent into the spinal fluid (spinal anesthesia) or into the space just outside the spinal fluid (epidural anesthesia). You may also be given sedatives to help you relax.  Nerve block or Moreno block  A small area of the body, such as an arm or leg, can be numbed using a nerve block or Marbury block.    Nerve block. During a nerve block, your skin is numbed. A needle is then inserted near nerves that serve the area to be numbed. Anesthetic is sent through the needle.    IV regional or Moreno block. For this type of block, an IV line is put into a vein. The blood flow to the area to be numbed is blocked for a short time. Anesthetic is sent through the IV.  Spinal anesthesia  Spinal anesthesia numbs your body from about the waist down.    Anesthetic is injected into the spinal fluid. This is a substance that surrounds the spinal cord in your spinal column. The anesthetic blocks pain traveling from the body to the brain.    To receive the anesthetic, your skin is numbed at the injection site on your back.    A needle is then inserted into the spinal space. Anesthetic is sent into the spinal fluid through the needle.  Epidural anesthesia  Epidural anesthesia is most commonly used during childbirth and may also be used after surgical procedures of the chest, belly, and legs.    Anesthetic is injected into the epidural space. This is just outside the dural sac which contains the spinal fluid.    To receive the anesthetic, your skin is numbed at the injection site on your back.    A needle is then inserted into the epidural space. Anesthetic is sent into the epidural space through the needle.    A small flexible catheter may be attached to the needle and left in place. This  "allows for continuous injections or infusions of anesthetic.  Anesthesia tools and medicines that might be near you during your procedure    Local anesthetic. This medicine is given through a needle numbs one region of your body.    Electrocardiography leads (electrodes). These are used to record your heart rate and rhythm.    Blood pressure cuff. A cuff is placed on your arm to keep track of your blood pressure.    Pulse oximeter. This small clip is placed on the end of the finger. It measures your blood oxygen level.    Sedatives. These medicines may be given through an IV. They help to relax you and keep you comfortable. You may stay awake or sleep lightly.    Oxygen. You may be given oxygen through a facemask.  Risks and possible complications  Regional anesthesia carries some risks. These include:    Nausea and vomiting    Headache    Backache    Decreased blood pressure    Allergic reaction to the anesthetic    Ongoing numbness (rare)    Irregular heartbeat (rare)    Cardiac arrest (rare)   Date Last Reviewed: 12/1/2016 2000-2017 [employer]. [address] [place] [address]. All rights reserved. This information is not intended as a substitute for professional medical care. Always follow your healthcare professional's instructions.                    Pending Results     Date and Time Order Name Status Description    5/22/2018 1353 Surgical pathology exam In process             Admission Information     Date & Time Provider Department Dept. Phone    5/22/2018 Kevin Sheldon MD Same Day Surgery [facility] [phone]      Your Vitals Were     Blood Pressure Temperature Respirations Height Weight Pulse Oximetry    141/69 98  F (36.7  C) 16 1.473 m (4' 10\") 51.5 kg (113 lb 8.6 oz) 99%    BMI (Body Mass Index)                   23.73 kg/m2           MyChart Information     [facility] gives you secure access to your electronic health record. If you see a primary care provider, you can " also send messages to your care team and make appointments. If you have questions, please call your primary care clinic.  If you do not have a primary care provider, please call [phone] and they will assist you.        Care EveryWhere ID     This is your Care EveryWhere ID. This could be used by other organizations to access your [facility] medical records  [id number]        Equal Access to Services     SARAH HEAD : Hadii aad ku hadasho Soomaali, waaxda luqadaha, qaybta kaalmada adeegyada, durga price haynegin nielsenmerryjose alejandro hermosillo . So [facility] [phone].    ATENCIÓN: Si habla matt, tiene a sarmiento disposición servicios gratuitos de asistencia lingüística. Taylor al [phone].    We comply with applicable federal civil rights laws and [place] laws. We do not discriminate on the basis of race, color, national origin, age, disability, sex, sexual orientation, or gender identity.               Review of your medicines      START taking        Dose / Directions    oxyCODONE IR 5 MG tablet   Commonly known as:  ROXICODONE   Used for:  Wound of left upper extremity, sequela        Dose:  5-10 mg   Take 1-2 tablets (5-10 mg) by mouth every 4 hours as needed for moderate to severe pain or other   Quantity:  30 tablet   Refills:  0       sulfamethoxazole-trimethoprim 800-160 MG per tablet   Commonly known as:  BACTRIM DS/SEPTRA DS   Used for:  Wound of left upper extremity, sequela        Dose:  1 tablet   Take 1 tablet by mouth 2 times daily   Quantity:  14 tablet   Refills:  0         CONTINUE these medicines which have NOT CHANGED        Dose / Directions    acetaminophen 325 MG tablet   Commonly known as:  TYLENOL   Used for:  Diffuse large B-cell lymphoma of extranodal site (H)        Dose:  650 mg   Take 2 tablets (650 mg) by mouth every 4 hours as needed for mild pain, fever or headaches   Refills:  0       apixaban ANTICOAGULANT 5 MG tablet   Commonly known as:  ELIQUIS   Used for:  Paroxysmal atrial  fibrillation (H)        Dose:  5 mg   Take 1 tablet (5 mg) by mouth 2 times daily   Quantity:  180 tablet   Refills:  3       ascorbic acid 500 MG Tabs   Used for:  Diffuse large B-cell lymphoma, unspecified body region (H)        Dose:  500 mg   Take 1 tablet (500 mg) by mouth daily   Quantity:  30 tablet   Refills:  0       atenolol 25 MG tablet   Commonly known as:  TENORMIN   Used for:  Atrial tachycardia (H)        Dose:  25 mg   Take 1 tablet (25 mg) by mouth 2 times daily   Quantity:  60 tablet   Refills:  3       calcium polycarbophil 625 MG tablet   Commonly known as:  FIBERCON   Indication:  loose stool        Dose:  1 tablet   Take 1 tablet by mouth 2 times daily   Refills:  0       calcium-vitamin D 600-400 MG-UNIT per tablet   Commonly known as:  CALTRATE   Used for:  Diffuse large B-cell lymphoma, unspecified body region (H)        Dose:  2 tablet   Take 2 tablets by mouth every morning   Quantity:  60 tablet   Refills:  0       colchicine 0.6 MG tablet   Commonly known as:  COLCYRS   Used for:  Pericarditis        Dose:  0.6 mg   Take 1 tablet (0.6 mg) by mouth daily   Quantity:  60 tablet   Refills:  0       digoxin 125 MCG tablet   Commonly known as:  LANOXIN   Used for:  Atrial flutter, unspecified type (H)        Take 250mcg daily for 2 days, then decrease to 125mcg daily   Quantity:  90 tablet   Refills:  3       furosemide 20 MG tablet   Commonly known as:  LASIX   Used for:  Chronic systolic heart failure (H)        Dose:  20 mg   Take 1 tablet (20 mg) by mouth daily   Quantity:  90 tablet   Refills:  3       gabapentin 100 MG capsule   Commonly known as:  NEURONTIN   Used for:  Critical illness myopathy        TAKE TWO CAPSULES BY MOUTH THREE TIMES A DAY   Quantity:  180 capsule   Refills:  3       HYDROCORTISONE PO        Dose:  100 mg   Take 100 mg by mouth IV   Refills:  0       hydroxychloroquine 200 MG tablet   Commonly known as:  PLAQUENIL   Used for:  Rheumatoid arthritis involving both  hands with positive rheumatoid factor (H)        Dose:  200 mg   Take 1 tablet (200 mg) by mouth 2 times daily   Quantity:  60 tablet   Refills:  5       multivitamin, therapeutic with minerals Tabs tablet   Used for:  Diffuse large B-cell lymphoma, unspecified body region (H)        Dose:  1 tablet   Take 1 tablet by mouth daily   Quantity:  30 each   Refills:  0       order for DME   Used for:  Hypertension, unspecified type        Equipment being ordered: BP cuff   Quantity:  1 Device   Refills:  1       order for DME   Used for:  Lymphedema of both lower extremities        Equipment being ordered: Thigh high compression stockings Class 2: off the shelf or custom, farrow foot wrap and juxta fit premium lower leg wrap, lymphedema bandaging supplies 3 sets   Quantity:  1 Units   Refills:  1       predniSONE 5 MG tablet   Commonly known as:  DELTASONE   Used for:  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 (H)        Dose:  5 mg   Take 1 tablet (5 mg) by mouth daily   Quantity:  90 tablet   Refills:  2       spironolactone 25 MG tablet   Commonly known as:  ALDACTONE   Used for:  Acute on chronic diastolic heart failure (H)        Dose:  25 mg   Take 1 tablet (25 mg) by mouth daily   Quantity:  30 tablet   Refills:  3       tolterodine 4 MG 24 hr capsule   Commonly known as:  DETROL LA   Used for:  Urinary urgency, Urge incontinence        Dose:  4 mg   Take 1 capsule (4 mg) by mouth daily   Quantity:  30 capsule   Refills:  11            Where to get your medicines      Some of these will need a paper prescription and others can be bought over the counter. Ask your nurse if you have questions.     Bring a paper prescription for each of these medications     oxyCODONE IR 5 MG tablet    sulfamethoxazole-trimethoprim 800-160 MG per tablet                Protect others around you: Learn how to safely use, store and throw away your medicines at www.disposemymeds.org.        ANTIBIOTIC  INSTRUCTION     You've Been Prescribed an Antibiotic - Now What?  Your healthcare team thinks that you or your loved one might have an infection. Some infections can be treated with antibiotics, which are powerful, life-saving drugs. Like all medications, antibiotics have side effects and should only be used when necessary. There are some important things you should know about your antibiotic treatment.      Your healthcare team may run tests before you start taking an antibiotic.    Your team may take samples (e.g., from your blood, urine or other areas) to run tests to look for bacteria. These test can be important to determine if you need an antibiotic at all and, if you do, which antibiotic will work best.      Within a few days, your healthcare team might change or even stop your antibiotic.    Your team may start you on an antibiotic while they are working to find out what is making you sick.    Your team might change your antibiotic because test results show that a different antibiotic would be better to treat your infection.    In some cases, once your team has more information, they learn that you do not need an antibiotic at all. They may find out that you don't have an infection, or that the antibiotic you're taking won't work against your infection. For example, an infection caused by a virus can't be treated with antibiotics. Staying on an antibiotic when you don't need it is more likely to be harmful than helpful.      You may experience side effects from your antibiotic.    Like all medications, antibiotics have side effects. Some of these can be serious.    Let you healthcare team know if you have any known allergies when you are admitted to the hospital.    One significant side effect of nearly all antibiotics is the risk of severe and sometimes deadly diarrhea caused by Clostridium difficile (C. Difficile). This occurs when a person takes antibiotics because some good germs are destroyed.  Antibiotic use allows C. diificile to take over, putting patients at high risk for this serious infection.    As a patient or caregiver, it is important to understand your or your loved one's antibiotic treatment. It is especially important for caregivers to speak up when patients can't speak for themselves. Here are some important questions to ask your healthcare team.    What infection is this antibiotic treating and how do you know I have that infection?    What side effects might occur from this antibiotic?    How long will I need to take this antibiotic?    Is it safe to take this antibiotic with other medications or supplements (e.g., vitamins) that I am taking?     Are there any special directions I need to know about taking this antibiotic? For example, should I take it with food?    How will I be monitored to know whether my infection is responding to the antibiotic?    What tests may help to make sure the right antibiotic is prescribed for me?      Information provided by:  www.cdc.gov/getsmart  U.S. Department of Health and Human Services  Centers for disease Control and Prevention  National Center for Emerging and Zoonotic Infectious Diseases  Division of Healthcare Quality Promotion        Information about OPIOIDS     PRESCRIPTION OPIOIDS: WHAT YOU NEED TO KNOW   You have a prescription for an opioid (narcotic) pain medicine. Opioids can cause addiction. If you have a history of chemical dependency of any type, you are at a higher risk of becoming addicted to opioids. Only take this medicine after all other options have been tried. Take it for as short a time and as few doses as possible.     Do not:    Drive. If you drive while taking these medicines, you could be arrested for driving under the influence (DUI).    Operate heavy machinery    Do any other dangerous activities while taking these medicines.     Drink any alcohol while taking these medicines.      Take with any other medicines that contain  acetaminophen. Read all labels carefully. Look for the word  acetaminophen  or  Tylenol.  Ask your pharmacist if you have questions or are unsure.    Store your pills in a secure place, locked if possible. We will not replace any lost or stolen medicine. If you don t finish your medicine, please throw away (dispose) as directed by your pharmacist. The [place] Pollution Control Agency has more information about safe disposal: https://www.pca.[facility].[place].us/living-green/managing-unwanted-medications    All opioids tend to cause constipation. Drink plenty of water and eat foods that have a lot of fiber, such as fruits, vegetables, prune juice, apple juice and high-fiber cereal. Take a laxative (Miralax, milk of magnesia, Colace, Senna) if you don t move your bowels at least every other day.              Medication List: This is a list of all your medications and when to take them. Check marks below indicate your daily home schedule. Keep this list as a reference.      Medications           Morning Afternoon Evening Bedtime As Needed    acetaminophen 325 MG tablet   Commonly known as:  TYLENOL   Take 2 tablets (650 mg) by mouth every 4 hours as needed for mild pain, fever or headaches                                apixaban ANTICOAGULANT 5 MG tablet   Commonly known as:  ELIQUIS   Take 1 tablet (5 mg) by mouth 2 times daily                                ascorbic acid 500 MG Tabs   Take 1 tablet (500 mg) by mouth daily                                atenolol 25 MG tablet   Commonly known as:  TENORMIN   Take 1 tablet (25 mg) by mouth 2 times daily                                calcium polycarbophil 625 MG tablet   Commonly known as:  FIBERCON   Take 1 tablet by mouth 2 times daily                                calcium-vitamin D 600-400 MG-UNIT per tablet   Commonly known as:  CALTRATE   Take 2 tablets by mouth every morning                                colchicine 0.6 MG tablet   Commonly known as:  COLCYRS   Take 1  tablet (0.6 mg) by mouth daily                                digoxin 125 MCG tablet   Commonly known as:  LANOXIN   Take 250mcg daily for 2 days, then decrease to 125mcg daily                                furosemide 20 MG tablet   Commonly known as:  LASIX   Take 1 tablet (20 mg) by mouth daily                                gabapentin 100 MG capsule   Commonly known as:  NEURONTIN   TAKE TWO CAPSULES BY MOUTH THREE TIMES A DAY                                HYDROCORTISONE PO   Take 100 mg by mouth IV                                hydroxychloroquine 200 MG tablet   Commonly known as:  PLAQUENIL   Take 1 tablet (200 mg) by mouth 2 times daily                                multivitamin, therapeutic with minerals Tabs tablet   Take 1 tablet by mouth daily                                order for DME   Equipment being ordered: BP cuff                                order for DME   Equipment being ordered: Thigh high compression stockings Class 2: off the shelf or custom, farrow foot wrap and juxta fit premium lower leg wrap, lymphedema bandaging supplies 3 sets                                oxyCODONE IR 5 MG tablet   Commonly known as:  ROXICODONE   Take 1-2 tablets (5-10 mg) by mouth every 4 hours as needed for moderate to severe pain or other                                predniSONE 5 MG tablet   Commonly known as:  DELTASONE   Take 1 tablet (5 mg) by mouth daily                                spironolactone 25 MG tablet   Commonly known as:  ALDACTONE   Take 1 tablet (25 mg) by mouth daily                                sulfamethoxazole-trimethoprim 800-160 MG per tablet   Commonly known as:  BACTRIM DS/SEPTRA DS   Take 1 tablet by mouth 2 times daily                                tolterodine 4 MG 24 hr capsule   Commonly known as:  DETROL LA   Take 1 capsule (4 mg) by mouth daily

## 2018-05-22 NOTE — IP AVS SNAPSHOT
Same Day Surgery [address] [address]    Phone:  [phone]                                       After Visit Summary   5/22/2018    Amelia Michel    MRN: [id number]           After Visit Summary Signature Page     I have received my discharge instructions, and my questions have been answered. I have discussed any challenges I see with this plan with the nurse or doctor.    ..........................................................................................................................................  Patient/Patient Representative Signature      ..........................................................................................................................................  Patient Representative Print Name and Relationship to Patient    ..................................................               ................................................  Date                                            Time    ..........................................................................................................................................  Reviewed by Signature/Title    ...................................................              ..............................................  Date                                                            Time

## 2018-05-22 NOTE — PROGRESS NOTES
No interval change in H&P. Eliquis has been held for three days. We will plan to hold for another three days postop. I went over the planned procedure in detail today. Patient understands the rationale behind the possible need for submuscular transposition of the ulnar nerve. All risks, benefits, and alternatives were explained in detail again. All questions were answered. The patient understands the plan and wishes to proceed with the procedure today. The patient understands the team approach to care in the operating room and agrees to the procedure as such. The patient has been cleared by Medicine for this procedure and all laboratory tests are stable.

## 2018-05-22 NOTE — ANESTHESIA POSTPROCEDURE EVALUATION
Patient: Amelia Michel    Procedure(s):  Left Arm Wound Excision And Closure, Possible Submuscular Transposition of Ulnar Nerve  (Choice Anesthesia) - Wound Class: I-Clean   - Wound Class: II-Clean Contaminated    Diagnosis:Chronic Wound   Diagnosis Additional Information: No value filed.    Anesthesia Type:  No value filed.    Note:  Anesthesia Post Evaluation    Patient location during evaluation: Phase 2  Patient participation: Able to fully participate in evaluation  Level of consciousness: awake and alert  Pain management: adequate  Airway patency: patent  Cardiovascular status: acceptable  Respiratory status: acceptable  Hydration status: acceptable  PONV: none     Anesthetic complications: None          Last vitals:  Vitals:    05/22/18 1445 05/22/18 1500 05/22/18 1515   BP: 109/50 123/72 141/69   Resp: 22 16 16   Temp: 36.7  C (98  F)     SpO2: 99% 98% 99%         Electronically Signed By: Yana Loza MD  May 22, 2018  3:53 PM

## 2018-05-22 NOTE — OP NOTE
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    PROCEDURES PERFORMED: Excision of left upper extremity chronic wound, size 10 x 4 cm, with primary closure.    SURGEON: Kevin Sheldon MD    ASSISTANT: Daisha Mcelroy MD    ANESTHESIA: Sedation with regional block.    ESTIMATED BLOOD LOSS: 10 mL    TOURNIQUET TIME: 70 min    SPECIMENS: Left arm chronic wound with fat necrosis, two short stitches proximal and one long stitch radial.    COMPLICATIONS: None.    DRAINS: 10 Maltese drain in left upper extremity.    IMPLANTS: None.    INDICATIONS: Patient is a 57-year-old female with a left arm chronic wound for over 1 year following IV extravasation during an episode of cardiac arrest.  This chronic wound resulted in a draining sinus required multiple dressing changes per day.  Biopsy of the wound revealed no cancer.  MRI of the wound revealed signs consistent with fat necrosis.  Risks benefits and alternatives were discussed for excision of this chronic wound so that the patient may avoid performing future dressing changes.  Patient accepted the associated risks including nerve damage and the possibility for ulnar nerve transposition.  She wished to proceed with surgery.  Cardiac clearance was obtained.    DESCRIPTION OF PROCEDURE: Patient was greeted in the preoperative holding area where her informed consent was reviewed.  She had no further questions and therefore her left arm was marked.  She then underwent a left upper extremity regional block.  Following this she was taken to the operating room and placed in a supine position with her left arm out on a hand table.  Sedation was achieved.  Bilateral lower leg SCDs were placed.  All pressure points were well-padded.  The left upper extremity was then  prepped and draped in a circumferential fashion.  A timeout was performed.    I marked out the extent of this wound involving the skin and also the underlying palpable fat necrosis proximally.  The treating sinus was painted with blue ink.  The left upper extremity was then exsanguinated using gravity and the tourniquet was brought to 250.  The proposed incisions were made with a 15 blade scalpel and dissection was performed around the fat necrosis areas using tenotomy scissors and bipolar cautery.  Care was taken not to damage any surrounding nerves, but there appeared to be a vein entering into the fat necrosis proximally.  This was ligated with a silk suture.  The area of fat necrosis was lifted off the underlying tissue, but a small segment of it was left behind overlying the deeper structures.  The deep muscular fascia was never violated.  Dissection proximally and ulnarly resulted in identification of the ulnar nerve which was outside at the surgical field at all times.  The wound was thoroughly irrigated and the excised mass of fat necrosis measured 10 x 4 cm.  The tourniquet was brought down and the tourniquet time was 70 minutes.  Hemostasis of the wound was achieved using bipolar cautery.  The excised wound was sent for pathology review with 2 short stitches proximal and one long stitch radial.    The wound came together primarily which was then closed with 2-0 Monocryl approximating the dermis in interrupted fashion and 3-0 nylon approximating the skin also in an interrupted fashion.  A 10 Mont[place]erratian drain was placed underneath all this.  The drain held suction at the end of the case.  All counts were correct at the end of the case.  A soft dressing was applied.  The patient was then awakened and transferred to the postop area in stable condition.  The left hand was pink and well-perfused at all times.    DISPOSITION: Home.

## 2018-05-22 NOTE — ANESTHESIA PROCEDURE NOTES
Peripheral Nerve Block Procedure Note    Staff:     Anesthesiologist:  CHACHO BELL    Resident/CRNA:  RENZO RICO    Block performed by resident/CRNA in the presence of a teaching physician    Location: Pre-op  Procedure Start/Stop TImes:      5/22/2018 11:43 AM     5/22/2018 11:48 AM    patient identified, IV checked, site marked, risks and benefits discussed, informed consent, monitors and equipment checked, pre-op evaluation, at physician/surgeon's request and post-op pain management      Correct Patient: Yes      Correct Position: Yes      Correct Site: Yes      Correct Procedure: Yes      Correct Laterality:  Yes    Site Marked:  Yes  Procedure details:     Procedure:  Supraclavicular    ASA:  3    Diagnosis:  Procedural anesthesia    Laterality:  Left    Position:  Sitting    Sterile Prep: chloraprep, mask and sterile gloves      Needle:  Insulated and short bevel    Needle gauge:  21    Needle length (mm):  100    Ultrasound: Yes      Ultrasound used to identify targeted nerve, plexus, or vascular structure and placed a needle adjacent to it      A permanent image is NOT entered into the patient's record.      Abnormal pain on injection: No      Blood Aspirated: No      Paresthesias:  No    Bleeding at site: No      Bolus via:  Needle    Infusion Method:  Single Shot    Complications:  None  Assessment/Narrative:     Injection made incrementally with aspirations every (mL):  5

## 2018-05-22 NOTE — ANESTHESIA CARE TRANSFER NOTE
Patient: Amelia Michel    Procedure(s):  Left Arm Wound Excision And Closure, Possible Submuscular Transposition of Ulnar Nerve  (Choice Anesthesia) - Wound Class: I-Clean   - Wound Class: II-Clean Contaminated    Diagnosis: Chronic Wound   Diagnosis Additional Information: No value filed.    Anesthesia Type:   No value filed.     Note:  Airway :Room Air  Patient transferred to:Phase II  Handoff Report: Identifed the Patient, Identified the Reponsible Provider, Reviewed the pertinent medical history, Discussed the surgical course, Reviewed Intra-OP anesthesia mangement and issues during anesthesia, Set expectations for post-procedure period and Allowed opportunity for questions and acknowledgement of understanding      Vitals: (Last set prior to Anesthesia Care Transfer)    CRNA VITALS  5/22/2018 1408 - 5/22/2018 1451      5/22/2018             Pulse: 66    SpO2: 97 %    Resp Rate (observed): 14                Electronically Signed By: Sherrie Molina CRNA, APRN CRNA  May 22, 2018  2:51 PM

## 2018-05-22 NOTE — PROGRESS NOTES
SPIRITUAL HEALTH SERVICES  [facility] 3C   PRE-SURGERY VISIT    Had pre-surgery visit with pt and  Wolf.  Provided spiritual support, prayer.     Rukhsana Quijano  Chaplain Resident  Pager [phone]

## 2018-05-22 NOTE — DISCHARGE INSTRUCTIONS
[facility]  Same-Day Surgery   Adult Discharge Orders & Instructions     For 24 hours after surgery    1. Get plenty of rest.  A responsible adult must stay with you for at least 24 hours after you leave the hospital.   2. Do not drive or use heavy equipment.  If you have weakness or tingling, don't drive or use heavy equipment until this feeling goes away.  3. Do not drink alcohol.  4. Avoid strenuous or risky activities.  Ask for help when climbing stairs.   5. You may feel lightheaded.  IF so, sit for a few minutes before standing.  Have someone help you get up.   6. If you have nausea (feel sick to your stomach): Drink only clear liquids such as apple juice, ginger ale, broth or 7-Up.  Rest may also help.  Be sure to drink enough fluids.  Move to a regular diet as you feel able.  7. You may have a slight fever. Call the doctor if your fever is over 100 F (37.7 C) (taken under the tongue) or lasts longer than 24 hours.  8. You may have a dry mouth, a sore throat, muscle aches or trouble sleeping.  These should go away after 24 hours.  9. Do not make important or legal decisions.   Call your doctor for any of the followin.  Signs of infection (fever, growing tenderness at the surgery site, a large amount of drainage or bleeding, severe pain, foul-smelling drainage, redness, swelling).    2. It has been over 8 to 10 hours since surgery and you are still not able to urinate (pass water).    3.  Headache for over 24 hours.    4.  Numbness, tingling or weakness the day after surgery (if you had spinal anesthesia).  To contact a doctor, call  Dr. Sheldon at [phone]______ or:    X   [phone] and ask for the resident on call for   ________Plastic Surgery________ (answered 24 hours a day)  X  x   Emergency Department:    [facility]: [phone]           Tips for taking pain medications  To get the best pain relief possible , remember these points:      Take pain  medications as directed, before pain becomes severe      Pain medication can upset your stomach: taking it with food may help      Constipation is a common side effect of pain medication. Drink plenty of  Fluids      Eat foods high in fiber. Take a stool softener  if recommended by your doctor or  Pharmacist.        Do not drink alcohol, drive or operate machinery while taking pain medications.      Ask about other ways to control pain, such as with heat, ice or relaxation.    Anesthesia: Regional Anesthesia    You re scheduled for surgery. During surgery, you ll receive medicine called anesthesia to keep you comfortable and pain-free. Your surgeon has decided that you ll receive regional anesthesia. This sheet tells you what to expect with this type of anesthesia.  What is regional anesthesia?  Regional anesthesia numbs one region of your body. The anesthesia may be given around nerves or into veins in your arms, neck, or legs (nerve block or Moreno block). Or it may be sent into the spinal fluid (spinal anesthesia) or into the space just outside the spinal fluid (epidural anesthesia). You may also be given sedatives to help you relax.  Nerve block or [place] block  A small area of the body, such as an arm or leg, can be numbed using a nerve block or Moreno block.    Nerve block. During a nerve block, your skin is numbed. A needle is then inserted near nerves that serve the area to be numbed. Anesthetic is sent through the needle.    IV regional or [place] block. For this type of block, an IV line is put into a vein. The blood flow to the area to be numbed is blocked for a short time. Anesthetic is sent through the IV.  Spinal anesthesia  Spinal anesthesia numbs your body from about the waist down.    Anesthetic is injected into the spinal fluid. This is a substance that surrounds the spinal cord in your spinal column. The anesthetic blocks pain traveling from the body to the brain.    To receive the anesthetic, your skin is  numbed at the injection site on your back.    A needle is then inserted into the spinal space. Anesthetic is sent into the spinal fluid through the needle.  Epidural anesthesia  Epidural anesthesia is most commonly used during childbirth and may also be used after surgical procedures of the chest, belly, and legs.    Anesthetic is injected into the epidural space. This is just outside the dural sac which contains the spinal fluid.    To receive the anesthetic, your skin is numbed at the injection site on your back.    A needle is then inserted into the epidural space. Anesthetic is sent into the epidural space through the needle.    A small flexible catheter may be attached to the needle and left in place. This allows for continuous injections or infusions of anesthetic.  Anesthesia tools and medicines that might be near you during your procedure    Local anesthetic. This medicine is given through a needle numbs one region of your body.    Electrocardiography leads (electrodes). These are used to record your heart rate and rhythm.    Blood pressure cuff. A cuff is placed on your arm to keep track of your blood pressure.    Pulse oximeter. This small clip is placed on the end of the finger. It measures your blood oxygen level.    Sedatives. These medicines may be given through an IV. They help to relax you and keep you comfortable. You may stay awake or sleep lightly.    Oxygen. You may be given oxygen through a facemask.  Risks and possible complications  Regional anesthesia carries some risks. These include:    Nausea and vomiting    Headache    Backache    Decreased blood pressure    Allergic reaction to the anesthetic    Ongoing numbness (rare)    Irregular heartbeat (rare)    Cardiac arrest (rare)   Date Last Reviewed: 12/1/2016 2000-2017 [employer]. [address] [place] [address], [place] [address]. All rights reserved. This information is not intended as a substitute for professional medical care.  Always follow your healthcare professional's instructions.

## 2018-05-24 ENCOUNTER — TELEPHONE (OUTPATIENT)
Dept: FAMILY MEDICINE | Facility: CLINIC | Age: 58
End: 2018-05-24

## 2018-05-24 ENCOUNTER — RADIANT APPOINTMENT (OUTPATIENT)
Dept: CT IMAGING | Facility: CLINIC | Age: 58
End: 2018-05-24
Attending: INTERNAL MEDICINE
Payer: COMMERCIAL

## 2018-05-24 ENCOUNTER — OFFICE VISIT (OUTPATIENT)
Dept: TRANSPLANT | Facility: CLINIC | Age: 58
End: 2018-05-24
Attending: INTERNAL MEDICINE
Payer: COMMERCIAL

## 2018-05-24 ENCOUNTER — APPOINTMENT (OUTPATIENT)
Dept: LAB | Facility: CLINIC | Age: 58
End: 2018-05-24
Attending: INTERNAL MEDICINE
Payer: COMMERCIAL

## 2018-05-24 ENCOUNTER — MYC MEDICAL ADVICE (OUTPATIENT)
Dept: FAMILY MEDICINE | Facility: CLINIC | Age: 58
End: 2018-05-24

## 2018-05-24 ENCOUNTER — OFFICE VISIT (OUTPATIENT)
Dept: ORTHOPEDICS | Facility: CLINIC | Age: 58
End: 2018-05-24
Payer: COMMERCIAL

## 2018-05-24 VITALS
BODY MASS INDEX: 23.89 KG/M2 | WEIGHT: 113.8 LBS | SYSTOLIC BLOOD PRESSURE: 123 MMHG | HEIGHT: 58 IN | TEMPERATURE: 97.7 F | HEART RATE: 54 BPM | DIASTOLIC BLOOD PRESSURE: 68 MMHG | OXYGEN SATURATION: 95 % | RESPIRATION RATE: 18 BRPM

## 2018-05-24 DIAGNOSIS — C83.398 DIFFUSE LARGE B-CELL LYMPHOMA OF EXTRANODAL SITE: ICD-10-CM

## 2018-05-24 DIAGNOSIS — Z00.00 HEALTH CARE MAINTENANCE: Primary | ICD-10-CM

## 2018-05-24 DIAGNOSIS — S41.102S WOUND OF LEFT UPPER EXTREMITY, SEQUELA: Primary | ICD-10-CM

## 2018-05-24 DIAGNOSIS — I47.19 ATRIAL TACHYCARDIA (H): Primary | ICD-10-CM

## 2018-05-24 LAB
ALBUMIN SERPL-MCNC: 3.4 G/DL (ref 3.4–5)
ALP SERPL-CCNC: 139 U/L (ref 40–150)
ALT SERPL W P-5'-P-CCNC: 38 U/L (ref 0–50)
ANION GAP SERPL CALCULATED.3IONS-SCNC: 10 MMOL/L (ref 3–14)
AST SERPL W P-5'-P-CCNC: 33 U/L (ref 0–45)
BASOPHILS # BLD AUTO: 0 10E9/L (ref 0–0.2)
BASOPHILS NFR BLD AUTO: 1.3 %
BILIRUB SERPL-MCNC: 0.6 MG/DL (ref 0.2–1.3)
BUN SERPL-MCNC: 18 MG/DL (ref 7–30)
CALCIUM SERPL-MCNC: 8.2 MG/DL (ref 8.5–10.1)
CHLORIDE SERPL-SCNC: 104 MMOL/L (ref 94–109)
CHOLEST SERPL-MCNC: 96 MG/DL
CO2 SERPL-SCNC: 20 MMOL/L (ref 20–32)
CREAT SERPL-MCNC: 0.92 MG/DL (ref 0.52–1.04)
DIFFERENTIAL METHOD BLD: ABNORMAL
EOSINOPHIL # BLD AUTO: 0.1 10E9/L (ref 0–0.7)
EOSINOPHIL NFR BLD AUTO: 1.9 %
ERYTHROCYTE [DISTWIDTH] IN BLOOD BY AUTOMATED COUNT: 12.4 % (ref 10–15)
GFR SERPL CREATININE-BSD FRML MDRD: 62 ML/MIN/1.7M2
GLUCOSE SERPL-MCNC: 97 MG/DL (ref 70–99)
HCT VFR BLD AUTO: 42.7 % (ref 35–47)
HDLC SERPL-MCNC: 35 MG/DL
HGB BLD-MCNC: 15 G/DL (ref 11.7–15.7)
IMM GRANULOCYTES # BLD: 0 10E9/L (ref 0–0.4)
IMM GRANULOCYTES NFR BLD: 0.9 %
LDLC SERPL CALC-MCNC: 37 MG/DL
LYMPHOCYTES # BLD AUTO: 0.5 10E9/L (ref 0.8–5.3)
LYMPHOCYTES NFR BLD AUTO: 15.4 %
MCH RBC QN AUTO: 36.3 PG (ref 26.5–33)
MCHC RBC AUTO-ENTMCNC: 35.1 G/DL (ref 31.5–36.5)
MCV RBC AUTO: 103 FL (ref 78–100)
MONOCYTES # BLD AUTO: 0.6 10E9/L (ref 0–1.3)
MONOCYTES NFR BLD AUTO: 19.2 %
NEUTROPHILS # BLD AUTO: 2 10E9/L (ref 1.6–8.3)
NEUTROPHILS NFR BLD AUTO: 61.3 %
NONHDLC SERPL-MCNC: 61 MG/DL
NRBC # BLD AUTO: 0 10*3/UL
NRBC BLD AUTO-RTO: 0 /100
PLATELET # BLD AUTO: 124 10E9/L (ref 150–450)
POTASSIUM SERPL-SCNC: 3.7 MMOL/L (ref 3.4–5.3)
PROT SERPL-MCNC: 6.2 G/DL (ref 6.8–8.8)
RADIOLOGIST FLAGS: ABNORMAL
RBC # BLD AUTO: 4.13 10E12/L (ref 3.8–5.2)
SODIUM SERPL-SCNC: 135 MMOL/L (ref 133–144)
TRIGL SERPL-MCNC: 118 MG/DL
WBC # BLD AUTO: 3.2 10E9/L (ref 4–11)

## 2018-05-24 PROCEDURE — 80061 LIPID PANEL: CPT | Performed by: INTERNAL MEDICINE

## 2018-05-24 PROCEDURE — G0179 MD RECERTIFICATION HHA PT: HCPCS | Performed by: FAMILY MEDICINE

## 2018-05-24 PROCEDURE — 80053 COMPREHEN METABOLIC PANEL: CPT | Performed by: INTERNAL MEDICINE

## 2018-05-24 PROCEDURE — 85025 COMPLETE CBC W/AUTO DIFF WBC: CPT | Performed by: INTERNAL MEDICINE

## 2018-05-24 PROCEDURE — 36415 COLL VENOUS BLD VENIPUNCTURE: CPT

## 2018-05-24 PROCEDURE — 82784 ASSAY IGA/IGD/IGG/IGM EACH: CPT | Performed by: INTERNAL MEDICINE

## 2018-05-24 PROCEDURE — G0463 HOSPITAL OUTPT CLINIC VISIT: HCPCS | Mod: ZF

## 2018-05-24 RX ORDER — IOPAMIDOL 755 MG/ML
69 INJECTION, SOLUTION INTRAVASCULAR ONCE
Status: COMPLETED | OUTPATIENT
Start: 2018-05-24 | End: 2018-05-24

## 2018-05-24 RX ADMIN — IOPAMIDOL 69 ML: 755 INJECTION, SOLUTION INTRAVASCULAR at 12:01

## 2018-05-24 ASSESSMENT — PAIN SCALES - GENERAL: PAINLEVEL: NO PAIN (0)

## 2018-05-24 NOTE — TELEPHONE ENCOUNTER
We did discuss that they'd be sending forms, I recommend a telephone visit to discuss forms so I know what to fill out for them. If they could schedule a time would be great.  Gala Stringer PA-C

## 2018-05-24 NOTE — TELEPHONE ENCOUNTER
Reason for Call:  Form, our goal is to have forms completed with 72 hours, however, some forms may require a visit or additional information.    Type of letter, form or note:  medical - FMLA for Romeo (spouse)    Who is the form from?: Insurance comp    Where did the form come from: form was faxed in    What clinic location was the form placed at?: [facility]    Where the form was placed: [facility]    What number is listed as a contact on the form?: [phone]       Call taken on 5/24/2018 at 3:53 PM by Kellee Taylor

## 2018-05-24 NOTE — NURSING NOTE
Chief Complaint   Patient presents with     Blood Draw     Pt is here for lab draw only     Pt only wanted labs drawn at this time. Would like vitals done later.     Priscila Rojas, MA

## 2018-05-24 NOTE — LETTER
5/24/2018       RE: Amelia Michel  [address] [place] [address]     Dear Colleague,    Thank you for referring your patient, Amelia Michel, to the [facility] at [facility]. Please see a copy of my visit note below.    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    INTERVAL HISTORY: Patient reports pain is well controlled.  Drain output is minimal.  Denies any numbness or tingling.    PHYSICAL EXAMINATION:  General: In no acute distress.  Left upper extremity dressings were taken down.  This revealed an intact incision with sutures.  There is swelling around the surgical site.  But no fluctuance was palpable.  Drain output is serous and minimal.  Patient is able to make a composite fist and extend all digits.  Sensation to light touch is intact in all III nerve distributions in the hand.  However, sensation to light touch is dulled in the medial forearm.    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  This is complicated by dullness in the distribution of the medial antebrachial cutaneous nerve.    PLAN: I explained to the patient that the medial and brachial cutaneous nerve was involved in the fat necrosis that was excised.  I am hoping that the patient will have sensation regenerate from the surrounding tissues.  Given the low output, the drain was removed today.  Patient is to perform daily dressing changes to the incision site.  She may shower.  She is not to restrict her activities and range of motion of her elbow.  She is to complete her course of antibiotics.  I will see her back in 2 weeks.    Total time spent with patient was 15 min of which greater than 50% was in counseling.    Again, thank you for allowing me to participate in the care of your patient.       Sincerely,    Kevin Sheldon MD

## 2018-05-24 NOTE — MR AVS SNAPSHOT
After Visit Summary   5/24/2018    Amelia Michel    MRN: [id number]           Patient Information     Date Of Birth          1960        Visit Information        Provider Department      5/24/2018 3:30 PM Lenore Rodriguez MD [facility] Blood and Marrow Transplant        Today's Diagnoses     Health care maintenance    -  1    Diffuse large B-cell lymphoma of extranodal site (H)              [facility] ([facility])  [address] [place] [address]  Phone: [phone]  Clinic Hours:   Monday-Thursday:7am to 7pm   Friday: 7am to 5pm   Weekends and holidays:    8am to noon (in general)  If your fever is 100.5  or greater,   call the clinic.  After hours call the   hospital at [phone] or   [phone]. Ask for the BMT   fellow on-call           Care Instructions    [facility] in 4 months with labs and CT scans prior          Follow-ups after your visit        Your next 10 appointments already scheduled     May 24, 2018  6:30 PM CDT   (Arrive by 6:15 PM)   Return Visit with Kevin Sheldon MD   [facility] Orthopaedic Clinic ([facility])    [address] [place] [address]  4th [facility] [address] [phone]            May 31, 2018  4:30 PM CDT   (Arrive by 4:15 PM)   Return Visit with Nayeli Pritchett MD   [facility] Rheumatology ([facility])    [address] [place] [address]  [address]  [facility] [id number] [phone]            Jun 01, 2018  3:00 PM CDT   (Arrive by 2:45 PM)   Return Plastic Surgery with Kevin Sheldon MD   [facility] Plastic and Reconstructive Surgery ([facility])    [address] [place] [address]  4th [facility] [id number] [phone]           Do not wear perfume.            Jun 06, 2018  8:45 AM CDT   (Arrive by 8:30 AM)   Cystoscopy with Vadim Sparks MD   [facility] Urology and Inst for Prostate and Urologic Cancers ([facility])    [address]  4th Floor  [facility] [address]   [phone]            Aug 15, 2018 10:00 AM CDT   (Arrive by 9:45 AM)   RETURN ARRHYTHMIA with Juan Eaton MD   [facility] ([facility])    [address]  [address]  [facility] [address]   [phone]            Sep 26, 2018 10:40 AM CDT   CT CHEST ABDOMEN PELVIS W/O & W CONTRAST with UCCT1   [facility] CT ([facility])    [address]  1st Floor  [facility] [address]   [phone]           Please bring any scans or X-rays taken at other hospitals, if similar tests were done. Also bring a list of your medicines, including vitamins, minerals and over-the-counter drugs. It is safest to leave personal items at home.  Be sure to tell your doctor:   If you have any allergies.   If there s any chance you are pregnant.   If you are breastfeeding.  How to prepare:   Do not eat or drink for 2 hours before your exam. If you need to take medicine, you may take it with small sips of water. (We may ask you to take liquid medicine as well.)   Please wear loose clothing, such as a sweat suit or jogging clothes. Avoid snaps, zippers and other metal. We may ask you to undress and put on a hospital gown.  Please arrive 30 minutes early for your CT. Once in the department you might be asked to drink water 15-20 minutes prior to your exam.  If indicated you may be asked to drink an oral contrast in advance of your CT.  If this is the case, the imaging team will let you know or be in contact with you prior to your appointment  Patients over 70 or patients with diabetes or kidney problems:   If you haven t had a blood test (creatinine test) within the last 30 days, the Cardiologist/Radiologist may require you to get this test prior to your exam.  If you have diabetes:   Continue to take your metformin medication on the day of your exam  If you have any questions,  please call the Imaging Department where you will have your exam.            Sep 26, 2018  1:15 PM CDT   [facility] with  [facility]RAW   [facility] Draw ([facility])    [address]  [address]  [facility] [address] [phone]            Sep 26, 2018  1:45 PM CDT   RETURN ONC with Lenore Rodriguez MD   [facility] Blood and Marrow Transplant ([facility])    [address] [place] [address]  [address]  [facility] [address] [phone]              Future tests that were ordered for you today     Open Future Orders        Priority Expected Expires Ordered    Comprehensive metabolic panel Routine 9/24/2018 11/24/2018 5/24/2018    CBC with platelets differential Routine 9/24/2018 11/24/2018 5/24/2018    IgG Routine 9/24/2018 11/24/2018 5/24/2018    CT Chest abdomen pelvis w & w/o contrast Routine 9/24/2018 11/24/2018 5/24/2018            Who to contact     If you have questions or need follow up information about today's clinic visit or your schedule please contact [facility] BLOOD [facility] MARROW [facility]ANSPL[facility] directly at [phone].  Normal or non-critical lab and imaging results will be communicated to you by Dw[employer], letter or phone within 4 business days after the clinic has received the results. If you do not hear from us within 7 days, please contact the clinic through [employer] or phone. If you have a critical or abnormal lab result, we will notify you by phone as soon as possible.  Submit refill requests through [employer] or call your pharmacy and they will forward the refill request to us. Please allow 3 business days for your refill to be completed.          Additional Information About Your Visit        [employer] Information     [employer] gives you secure access to your electronic health record. If you see a primary care provider, you can also send messages to your care team and make appointments. If you have questions, please call your  "primary care clinic.  If you do not have a primary care provider, please call [phone] and they will assist you.        Care EveryWhere ID     This is your Care EveryWhere ID. This could be used by other organizations to access your [facility] medical records  [id number]        Your Vitals Were     Pulse Temperature Respirations Height Pulse Oximetry BMI (Body Mass Index)    54 97.7  F (36.5  C) (Oral) 18 1.473 m (4' 9.99\") 95% 23.79 kg/m2       Blood Pressure from Last 3 Encounters:   05/24/18 123/68   05/22/18 118/74   05/17/18 120/73    Weight from Last 3 Encounters:   05/24/18 51.6 kg (113 lb 12.8 oz)   05/22/18 51.5 kg (113 lb 8.6 oz)   05/17/18 51.7 kg (113 lb 14.4 oz)              We Performed the Following     CBC with platelets differential     Comprehensive metabolic panel     IgG     Lipid panel reflex to direct LDL        Recent Review Flowsheet Data     BMT Recent Results Latest Ref Rng & Units 3/23/2018 4/5/2018 4/12/2018 4/24/2018 5/17/2018 5/22/2018 5/24/2018    WBC 4.0 - 11.0 10e9/L 3.4(L) - - - 2.4(L) - 3.2(L)    Hemoglobin 11.7 - 15.7 g/dL 15.2 - - - 15.7 15.1 15.0    Platelet Count 150 - 450 10e9/L 87(L) - - - 122(L) - 124(L)    Platelets 150 - 450 10:9/L - - - - - - -    Neutrophils (Absolute) 1.6 - 8.3 10e9/L - - - - 1.3(L) - 2.0    INR 0.86 - 1.14 - - - - - - -    Sodium 133 - 144 mmol/L 139 135 - 135 135 - 135    Potassium 3.4 - 5.3 mmol/L 4.0 4.2 3.7 4.4 3.9 4.1 3.7    Chloride 94 - 109 mmol/L 108 101 - 102 101 - 104    Glucose 70 - 99 mg/dL 92 108(H) - 165(H) 107(H) 128(H) 97    Urea Nitrogen 7 - 30 mg/dL 14 24 - 26 20 - 18    Creatinine 0.52 - 1.04 mg/dL 0.76 0.90 - 0.80 0.81 - 0.92    Calcium (Total) 8.5 - 10.1 mg/dL 9.2 9.3 - 9.3 9.8 - 8.2(L)    Protein (Total) 6.8 - 8.8 g/dL 6.2(L) - - - 7.9 - 6.2(L)    Albumin 3.4 - 5.0 g/dL 3.7 - - - 4.4 - 3.4    Bilirubin (Direct) 0.0 - 0.2 mg/dL - - - - - - -    Alkaline Phosphatase 40 - 150 U/L 136 - - - 204(H) - 139    AST 0 - 45 U/L 28 - - - " 43 - 33    ALT 0 - 50 U/L 57(H) - - - 46 - 38    MCV 78 - 100 fl 115(H) - - - 101(H) - 103(H)               Primary Care Provider Office Phone # Fax #    Gala Stringer PA-C [phone] [address] [place] [address]        Equal Access to Services     [facility]: Hadii aad ku hadasho Soomaali, waaxda luqadaha, qaybta kaalmada adeegyada, waxay idiin hayaan adeeg khodilia laGeraldaan ah. So [facility] [phone].    ATENCIÓN: Si marlee gutierrez, tiene a sarmiento disposición servicios gratuitos de asistencia lingüística. [facility] [phone].    We comply with applicable federal civil rights laws and [place] laws. We do not discriminate on the basis of race, color, national origin, age, disability, sex, sexual orientation, or gender identity.            Thank you!     Thank you for choosing [facility] BLOOD AND MARROW TRANSPLANT  for your care. Our goal is always to provide you with excellent care. Hearing back from our patients is one way we can continue to improve our services. Please take a few minutes to complete the written survey that you may receive in the mail after your visit with us. Thank you!             Your Updated Medication List - Protect others around you: Learn how to safely use, store and throw away your medicines at www.disposemymeds.org.          This list is accurate as of 5/24/18  6:12 PM.  Always use your most recent med list.                   Brand Name Dispense Instructions for use Diagnosis    acetaminophen 325 MG tablet    TYLENOL     Take 2 tablets (650 mg) by mouth every 4 hours as needed for mild pain, fever or headaches    Diffuse large B-cell lymphoma of extranodal site (H)       apixaban ANTICOAGULANT 5 MG tablet    ELIQUIS    180 tablet    Take 1 tablet (5 mg) by mouth 2 times daily    Paroxysmal atrial fibrillation (H)       ascorbic acid 500 MG Tabs     30 tablet    Take 1 tablet (500 mg) by mouth daily    Diffuse large B-cell lymphoma, unspecified body region (H)        atenolol 25 MG tablet    TENORMIN    60 tablet    Take 1 tablet (25 mg) by mouth 2 times daily    Atrial tachycardia (H)       calcium polycarbophil 625 MG tablet    FIBERCON     Take 1 tablet by mouth 2 times daily        calcium-vitamin D 600-400 MG-UNIT per tablet    CALTRATE    60 tablet    Take 2 tablets by mouth every morning    Diffuse large B-cell lymphoma, unspecified body region (H)       colchicine 0.6 MG tablet    COLCYRS    60 tablet    Take 1 tablet (0.6 mg) by mouth daily    Pericarditis       digoxin 125 MCG tablet    LANOXIN    90 tablet    Take 250mcg daily for 2 days, then decrease to 125mcg daily    Atrial flutter, unspecified type (H)       furosemide 20 MG tablet    LASIX    90 tablet    Take 1 tablet (20 mg) by mouth daily    Chronic systolic heart failure (H)       gabapentin 100 MG capsule    NEURONTIN    180 capsule    TAKE TWO CAPSULES BY MOUTH THREE TIMES A DAY    Critical illness myopathy       * HYDROCORTISONE PO      Take 100 mg by mouth IV        * hydrocortisone 5 MG tablet    CORTEF    5 tablet    Take 5 tablets (25 mg) by mouth daily for 1 day    Rheumatoid arthritis involving multiple sites with positive rheumatoid factor (H), Preop general physical exam       hydroxychloroquine 200 MG tablet    PLAQUENIL    60 tablet    Take 1 tablet (200 mg) by mouth 2 times daily    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 (H)       multivitamin, therapeutic with minerals Tabs tablet     30 each    Take 1 tablet by mouth daily    Diffuse large B-cell lymphoma, unspecified body region (H)       order for DME     1 Device    Equipment being ordered: BP cuff    Hypertension, unspecified type       order for DME     1 Units    Equipment being ordered: Thigh high compression stockings Class 2: off the shelf or custom, farrow foot wrap and juxta fit premium lower leg wrap, lymphedema bandaging supplies 3 sets    Lymphedema of both lower extremities        oxyCODONE IR 5 MG tablet    ROXICODONE    30 tablet    Take 1-2 tablets (5-10 mg) by mouth every 4 hours as needed for moderate to severe pain or other    Wound of left upper extremity, sequela       predniSONE 5 MG tablet    DELTASONE    90 tablet    Take 1 tablet (5 mg) by mouth daily    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 (H)       spironolactone 25 MG tablet    ALDACTONE    30 tablet    Take 1 tablet (25 mg) by mouth daily    Acute on chronic diastolic heart failure (H)       sulfamethoxazole-trimethoprim 800-160 MG per tablet    BACTRIM DS/SEPTRA DS    14 tablet    Take 1 tablet by mouth 2 times daily    Wound of left upper extremity, sequela       tolterodine 4 MG 24 hr capsule    DETROL LA    30 capsule    Take 1 capsule (4 mg) by mouth daily    Urinary urgency, Urge incontinence       * Notice:  This list has 2 medication(s) that are the same as other medications prescribed for you. Read the directions carefully, and ask your doctor or other care provider to review them with you.

## 2018-05-24 NOTE — NURSING NOTE
"Oncology Rooming Note    May 24, 2018 3:19 PM   Amelia Michel is a 57 year old female who presents for:    Chief Complaint   Patient presents with     Blood Draw     Pt is here for lab draw only     RECHECK     Diffuse large B cell lymphoma      Initial Vitals: /68 (BP Location: Right arm, Patient Position: Chair, Cuff Size: Adult Regular)  Pulse 54  Temp 97.7  F (36.5  C) (Oral)  Resp 18  Ht 1.473 m (4' 9.99\")  Wt 51.6 kg (113 lb 12.8 oz)  SpO2 95%  BMI 23.79 kg/m2 Estimated body mass index is 23.79 kg/(m^2) as calculated from the following:    Height as of this encounter: 1.473 m (4' 9.99\").    Weight as of this encounter: 51.6 kg (113 lb 12.8 oz). Body surface area is 1.45 meters squared.  No Pain (0) Comment: Data Unavailable   No LMP recorded. Patient is postmenopausal.  Allergies reviewed: Yes  Medications reviewed: Yes    Medications: Medication refills not needed today.  Pharmacy name entered into [facility]: [facility] [place] - [id number]    Clinical concerns:  No new concerns  Provider was notified.    5 minutes for nursing intake (face to face time)     Macey Lombardi MA              "

## 2018-05-24 NOTE — MR AVS SNAPSHOT
After Visit Summary   5/24/2018    Amelia Michel    MRN: [id number]           Patient Information     Date Of Birth          1960        Visit Information        Provider Department      5/24/2018 6:30 PM Kevin Sheldon MD [facility] Orthopaedic Clinic        Today's Diagnoses     Wound of left upper extremity, sequela    -  1       Follow-ups after your visit        Follow-up notes from your care team     Return in about 2 weeks (around 6/7/2018).      Your next 10 appointments already scheduled     May 24, 2018  6:30 PM CDT   (Arrive by 6:15 PM)   Return Visit with Kevin Sheldon MD   [facility] ([facility])    [address] [place] [address]  [address] [facility] [id number] [phone]            May 31, 2018  4:30 PM CDT   (Arrive by 4:15 PM)   Return Visit with Nayeli Pritchett MD   [facility] Rheumatology ([facility])    [address] [place] [address]  [address]  [facility] [id number] [phone]            Jun 01, 2018  3:00 PM CDT   (Arrive by 2:45 PM)   Return Plastic Surgery with Kevin Sheldon MD   [facility] Plastic and Reconstructive Surgery ([facility])    [address] [place] [address]  [address] [facility] [address] [phone]           Do not wear perfume.            Jun 06, 2018  8:45 AM CDT   (Arrive by 8:30 AM)   Cystoscopy with Vadim Sparks MD   [facility] Urology and Inst for Prostate and Urologic Cancers ([facility])    [address] [place] [address]  [address] [facility] [address] [phone]            Aug 15, 2018 10:00 AM CDT   (Arrive by 9:45 AM)   RETURN ARRHYTHMIA with Juan Eaton MD   [facility] Heart Care ([facility])    [address] [place] [address]  [address]  [facility] [id number] [phone]              Future tests that were ordered for you today     Open Future Orders        Priority Expected Expires Ordered     Comprehensive metabolic panel Routine 9/24/2018 11/24/2018 5/24/2018    CBC with platelets differential Routine 9/24/2018 11/24/2018 5/24/2018    IgG Routine 9/24/2018 11/24/2018 5/24/2018    CT Chest abdomen pelvis w & w/o contrast Routine 9/24/2018 11/24/2018 5/24/2018            Who to contact     Please call your clinic at [phone] to:    Ask questions about your health    Make or cancel appointments    Discuss your medicines    Learn about your test results    Speak to your doctor            Additional Information About Your Visit        [employer] Information     [employer] gives you secure access to your electronic health record. If you see a primary care provider, you can also send messages to your care team and make appointments. If you have questions, please call your primary care clinic.  If you do not have a primary care provider, please call [phone] and they will assist you.      [employer] is an electronic gateway that provides easy, online access to your medical records. With [employer], you can request a clinic appointment, read your test results, renew a prescription or communicate with your care team.     To access your existing account, please contact your [facility] or call [phone] for assistance.        Care EveryWhere ID     This is your Care EveryWhere ID. This could be used by other organizations to access your [facility] medical records  [id number]         Blood Pressure from Last 3 Encounters:   05/24/18 123/68   05/22/18 118/74   05/17/18 120/73    Weight from Last 3 Encounters:   05/24/18 51.6 kg (113 lb 12.8 oz)   05/22/18 51.5 kg (113 lb 8.6 oz)   05/17/18 51.7 kg (113 lb 14.4 oz)              Today, you had the following     No orders found for display       Primary Care Provider Office Phone # Fax #    Gala Stringer PA-C [phone] [address] [place] [address]        Equal Access to Services     [facility]: Kay  laurita Flores, watashda luqadaha, qaybta kaalzenobia bae, durga ednain hayaaall juárezja giaodilia laGeraldnegin henny. So [facility] [phone].    ATENCIÓN: Si harpreetla matt, tiene a sarmiento disposición servicios gratuitos de asistencia lingüística. Taylor al [phone].    We comply with applicable federal civil rights laws and [place] laws. We do not discriminate on the basis of race, color, national origin, age, disability, sex, sexual orientation, or gender identity.            Thank you!     Thank you for choosing [facility]THOP[facility]  for your care. Our goal is always to provide you with excellent care. Hearing back from our patients is one way we can continue to improve our services. Please take a few minutes to complete the written survey that you may receive in the mail after your visit with us. Thank you!             Your Updated Medication List - Protect others around you: Learn how to safely use, store and throw away your medicines at www.disposemymeds.org.          This list is accurate as of 5/24/18  5:18 PM.  Always use your most recent med list.                   Brand Name Dispense Instructions for use Diagnosis    acetaminophen 325 MG tablet    TYLENOL     Take 2 tablets (650 mg) by mouth every 4 hours as needed for mild pain, fever or headaches    Diffuse large B-cell lymphoma of extranodal site (H)       apixaban ANTICOAGULANT 5 MG tablet    ELIQUIS    180 tablet    Take 1 tablet (5 mg) by mouth 2 times daily    Paroxysmal atrial fibrillation (H)       ascorbic acid 500 MG Tabs     30 tablet    Take 1 tablet (500 mg) by mouth daily    Diffuse large B-cell lymphoma, unspecified body region (H)       atenolol 25 MG tablet    TENORMIN    60 tablet    Take 1 tablet (25 mg) by mouth 2 times daily    Atrial tachycardia (H)       calcium polycarbophil 625 MG tablet    FIBERCON     Take 1 tablet by mouth 2 times daily        calcium-vitamin D 600-400 MG-UNIT per tablet    CALTRATE    60 tablet    Take 2  tablets by mouth every morning    Diffuse large B-cell lymphoma, unspecified body region (H)       colchicine 0.6 MG tablet    COLCYRS    60 tablet    Take 1 tablet (0.6 mg) by mouth daily    Pericarditis       digoxin 125 MCG tablet    LANOXIN    90 tablet    Take 250mcg daily for 2 days, then decrease to 125mcg daily    Atrial flutter, unspecified type (H)       furosemide 20 MG tablet    LASIX    90 tablet    Take 1 tablet (20 mg) by mouth daily    Chronic systolic heart failure (H)       gabapentin 100 MG capsule    NEURONTIN    180 capsule    TAKE TWO CAPSULES BY MOUTH THREE TIMES A DAY    Critical illness myopathy       * HYDROCORTISONE PO      Take 100 mg by mouth IV        * hydrocortisone 5 MG tablet    CORTEF    5 tablet    Take 5 tablets (25 mg) by mouth daily for 1 day    Rheumatoid arthritis involving multiple sites with positive rheumatoid factor (H), Preop general physical exam       hydroxychloroquine 200 MG tablet    PLAQUENIL    60 tablet    Take 1 tablet (200 mg) by mouth 2 times daily    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 (H)       multivitamin, therapeutic with minerals Tabs tablet     30 each    Take 1 tablet by mouth daily    Diffuse large B-cell lymphoma, unspecified body region (H)       order for DME     1 Device    Equipment being ordered: BP cuff    Hypertension, unspecified type       order for DME     1 Units    Equipment being ordered: Thigh high compression stockings Class 2: off the shelf or custom, farrow foot wrap and juxta fit premium lower leg wrap, lymphedema bandaging supplies 3 sets    Lymphedema of both lower extremities       oxyCODONE IR 5 MG tablet    ROXICODONE    30 tablet    Take 1-2 tablets (5-10 mg) by mouth every 4 hours as needed for moderate to severe pain or other    Wound of left upper extremity, sequela       predniSONE 5 MG tablet    DELTASONE    90 tablet    Take 1 tablet (5 mg) by mouth daily    Rheumatoid arthritis  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 (H)       spironolactone 25 MG tablet    ALDACTONE    30 tablet    Take 1 tablet (25 mg) by mouth daily    Acute on chronic diastolic heart failure (H)       sulfamethoxazole-trimethoprim 800-160 MG per tablet    BACTRIM DS/SEPTRA DS    14 tablet    Take 1 tablet by mouth 2 times daily    Wound of left upper extremity, sequela       tolterodine 4 MG 24 hr capsule    DETROL LA    30 capsule    Take 1 capsule (4 mg) by mouth daily    Urinary urgency, Urge incontinence       * Notice:  This list has 2 medication(s) that are the same as other medications prescribed for you. Read the directions carefully, and ask your doctor or other care provider to review them with you.

## 2018-05-24 NOTE — PROGRESS NOTES
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    INTERVAL HISTORY: Patient reports pain is well controlled.  Drain output is minimal.  Denies any numbness or tingling.    PHYSICAL EXAMINATION:  General: In no acute distress.  Left upper extremity dressings were taken down.  This revealed an intact incision with sutures.  There is swelling around the surgical site.  But no fluctuance was palpable.  Drain output is serous and minimal.  Patient is able to make a composite fist and extend all digits.  Sensation to light touch is intact in all III nerve distributions in the hand.  However, sensation to light touch is dulled in the medial forearm.    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  This is complicated by dullness in the distribution of the medial antebrachial cutaneous nerve.    PLAN: I explained to the patient that the medial and brachial cutaneous nerve was involved in the fat necrosis that was excised.  I am hoping that the patient will have sensation regenerate from the surrounding tissues.  Given the low output, the drain was removed today.  Patient is to perform daily dressing changes to the incision site.  She may shower.  She is not to restrict her activities and range of motion of her elbow.  She is to complete her course of antibiotics.  I will see her back in 2 weeks.    Total time spent with patient was 15 min of which greater than 50% was in counseling.

## 2018-05-24 NOTE — PROGRESS NOTES
[facility] Visit  5/24/2018         Disease and Treatment History:  1. Complicated patient with history of Rheumatoid Arthritis status post long term treatment with methotrexate with recent significant complicated course with cardiac arrest, admission with hypotension, sepsis, ICU stay and intubation with subsequent additional readmission from TCU in 6/2017 for FUO with extensive work-up with eventual finding of progression cytopenias with eventual lymphoma diagnosis  2. Bone Marrow Biopsy: 7/12/2017: Highly suspicious for involvement by B cell lymphoma with foci of large atypical CD20+ cells  3. PET CT 7/19/2017: Extensive activity: Right paratracheal lesion with SUV 28, many liver lesions with SUV 15, cardiac lesion intraarterial septum, numerous bone lesions.  4. 7/21 Liver Biopsy: Consistent with Diffuse Large B Cell Lymphoma non-germinal center phenotype  -- FISH for myc, bcl2, bcl6 negative for double-hit lymphoma  5. IPI based on Age < 60 (0 points) + PS 2 (1 + point) + Stage IV Disease (1 point) + Extranodal disease > 1 site (1 point) + elevated LDH (1 point) = 4 Poor Risk Disease  6. Cycle 1 given as R-EPOCH Day 1 = 7/27/2017  -- complications of transfusion dependence and need for acute rehab placement (likely more result of extensive hospital stays)  - LP negative for CNS involvement 8/23/2017  7. Cycle #2: Transition to R-CHOP Day 1 = 8/22/2017  - Day 15 high dose MTX for CNS prophylaxis  - complicated by significant fluid overload and PICC associated DVT  8. Cycle #3 Day 1 = 9/20/2017 Post Cycle 3 PET CR. Cycle 4 10/11/2017 + IT prophylaxis, Cycle 5 11/8/17, Cycle 6 11/29/2017 + IT prophylaxis  -- Post Treatment completion PET 1/15/2018: Complete Remission      HPI:    Amelia comes in today for 4 month follow-up and to review CT scans. Since I last saw her she has had a number of issues and procedures:   -Hospitalization 3/7/2018-3/11/2018: for SOB in setting of viral illness and UTI. Found  "to have loculated pericardial effusion and hepatomegaly and ongoing Paroxysmal A. Fib.   - Status post Cardioversion 3/12/2018, 3/23/2018, 4/12/2018  - Plastic Surgery following elbow wound  - EP follow-up 4/4/2018: Continue Eliquis. Added Dig  - Pre-Op for Left upper extremity repair  - 5/22 Left upper extremity would repair    She notes that the surgery went well on Tuesday. The drain output is decreasing and she is scheduled to restart her Eliquis tomorrow morning. She is going up for a dressing change today. She notes no current chest pain or SOB. No nausea or vomiting. Some intermittent diarrhea that seems to be food related. No abdominal pain. Edema pretty much resolved and continues to use the stockings. 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     10 point ROS otherwise negative      Physical Exam:  /68 (BP Location: Right arm, Patient Position: Chair, Cuff Size: Adult Regular)  Pulse 54  Temp 97.7  F (36.5  C) (Oral)  Resp 18  Ht 1.473 m (4' 9.99\")  Wt 51.6 kg (113 lb 12.8 oz)  SpO2 95%  BMI 23.79 kg/m2    Gen: looks well. KPS 80  HEENT: OP clear, no erythema or thrush, no palpable MCKAYLA  Lungs:CTAB no crackles or wheezes  CV: stable loud systolic murmur. Regular on exam today  Abd:soft  Ext:no edema. Brace on right leg. Stockings on  - Left arm wrapped and in a sling with drain holding about 5 cc of fluid      Labs:    Results for FIDELIA MIDDLETON (MRN [id number]) as of 5/24/2018 15:51   Ref. Range 5/24/2018 15:06   Sodium Latest Ref Range: 133 - 144 mmol/L 135   Potassium Latest Ref Range: 3.4 - 5.3 mmol/L 3.7   Chloride Latest Ref Range: 94 - 109 mmol/L 104   Carbon Dioxide Latest Ref Range: 20 - 32 mmol/L 20   Urea Nitrogen Latest Ref Range: 7 - 30 mg/dL 18   Creatinine Latest Ref Range: 0.52 - 1.04 mg/dL 0.92   GFR Estimate Latest Ref Range: >60 mL/min/1.7m2 62   GFR Estimate If Black Latest Ref Range: >60 " mL/min/1.7m2 76   Calcium Latest Ref Range: 8.5 - 10.1 mg/dL 8.2 (L)   Anion Gap Latest Ref Range: 3 - 14 mmol/L 10   Albumin Latest Ref Range: 3.4 - 5.0 g/dL 3.4   Protein Total Latest Ref Range: 6.8 - 8.8 g/dL 6.2 (L)   Bilirubin Total Latest Ref Range: 0.2 - 1.3 mg/dL 0.6   Alkaline Phosphatase Latest Ref Range: 40 - 150 U/L 139   ALT Latest Ref Range: 0 - 50 U/L 38   AST Latest Ref Range: 0 - 45 U/L 33   Results for FIDELIA MIDDLETON (MRN [id number]) as of 5/24/2018 16:03   Ref. Range 5/24/2018 15:06   Cholesterol Latest Ref Range: <200 mg/dL 96   HDL Cholesterol Latest Ref Range: >49 mg/dL 35 (L)   LDL Cholesterol Calculated Latest Ref Range: <100 mg/dL 37   Non HDL Cholesterol Latest Ref Range: <130 mg/dL 61   Triglycerides Latest Ref Range: <150 mg/dL 118     Results for FIDELIA MIDDLETON (MRN [id number]) as of 5/24/2018 16:05   Ref. Range 5/24/2018 15:06   WBC Latest Ref Range: 4.0 - 11.0 10e9/L 3.2 (L)   Hemoglobin Latest Ref Range: 11.7 - 15.7 g/dL 15.0   Hematocrit Latest Ref Range: 35.0 - 47.0 % 42.7   Platelet Count Latest Ref Range: 150 - 450 10e9/L 124 (L)   RBC Count Latest Ref Range: 3.8 - 5.2 10e12/L 4.13   MCV Latest Ref Range: 78 - 100 fl 103 (H)   MCH Latest Ref Range: 26.5 - 33.0 pg 36.3 (H)   MCHC Latest Ref Range: 31.5 - 36.5 g/dL 35.1   RDW Latest Ref Range: 10.0 - 15.0 % 12.4   Diff Method Unknown Automated Method   % Neutrophils Latest Units: % 61.3   % Lymphocytes Latest Units: % 15.4   % Monocytes Latest Units: % 19.2   % Eosinophils Latest Units: % 1.9   % Basophils Latest Units: % 1.3   % Immature Granulocytes Latest Units: % 0.9   Nucleated RBCs Latest Ref Range: 0 /100 0   Absolute Neutrophil Latest Ref Range: 1.6 - 8.3 10e9/L 2.0   Absolute Lymphocytes Latest Ref Range: 0.8 - 5.3 10e9/L 0.5 (L)   Absolute Monocytes Latest Ref Range: 0.0 - 1.3 10e9/L 0.6   Absolute Eosinophils Latest Ref Range: 0.0 - 0.7 10e9/L 0.1   Absolute Basophils Latest Ref Range: 0.0 - 0.2 10e9/L 0.0   Abs  Immature Granulocytes Latest Ref Range: 0 - 0.4 10e9/L 0.0   Absolute Nucleated RBC Unknown 0.0     CT C/A/P: 5/24/2018    LUNGS: Mild biapical pleural thickening/scarring. Mild bilateral lower  lobes dependent atelectasis. No acute airspace opacities.     Chest: Multinodular heterogeneous thyroid gland with a nodule  extending within the anterior/superior mediastinal, stable. Central  tracheobronchial tree is patent. Thoracic aorta and main pulmonary  artery have normal diameter. Atherosclerotic calcifications of the  thoracic aorta. Mild cardiomegaly. Postoperative changes of CABG. No  central pulmonary embolism. Small to moderate pericardial effusion,  increased from the prior study. No pleural effusions. No pneumothorax.  No axillary, supraclavicular, pectoral enlarged lymph nodes.  Subcentimeter mediastinal lymph nodes measuring up to 7 mm short axis,  stable. No hilar lymphadenopathy.     Abdomen and pelvis: Stable subcentimeter liver hypodensities, too  small to characterize. No new or enlarging liver lesions. Patent  hepatic vasculature. No biliary tree dilation. Unremarkable  gallbladder. Homogeneous pancreatic parenchyma. Main pancreatic duct  is not dilated. The spleen is not enlarged. No focal splenic lesions.  There are splenules. Unremarkable adrenal gland. No renal stones or  hydronephrosis. Stable subcentimeter cortical hypodensities in both  kidneys too small to characterize, likely representing renal cysts.  Unremarkable urinary bladder. Pelvic phleboliths. Unremarkable uterus.  No adnexal masses. No free fluid. No free air. No inguinal  lymphadenopathy. No suspicious pelvic lymph nodes. Subcentimeter  retroperitoneal and mesenteric lymph nodes, not enlarged by size  criteria. No retrocrural lymphadenopathy. No abdominal aortic  aneurysm. Atherosclerotic calcifications of the abdominal aorta and  its major branches.     Bones: Enthesopathic changes of the pelvis. Degenerative changes of  the spine.  Degenerative changes of bilateral SI joints and hips.  Scattered Schmorl's nodes. Median sternotomy wires. S-shaped  thoracolumbar curvature. Scattered benign bone islands. No aggressive  bone lesions.         IMPRESSION:  1. No evidence of active lymphoma consistent with continued complete  response by Lugano criteria.  2. Stable cardiomegaly.  3. Small to moderate pericardial effusion, increased from the prior  study.     A/P: 56 yo woman with history of rhematoid arthritis and recent diagnosis of stage IVB high risk aggressive Diffuse large B cell lymphoma    1. Lymphoma: Got cycle #1 in the hospital and I chose R-EPOCH for infusional nature due to possible intracardiac involvement and also extensive disease to allow for slower lymphoma kill. Tolerated this well, aside from significant cytopenias (which were present at diagnosis). Then transitioned to R-CHOP to finish out a total of 6 cycles of chemotherapy (1 R-EPOCH and 5 R-CHOP) with initially planned for High Dose MTX on Day 15 of cycle 2,4, and 6 due to high IPI but then transition to prophy IT chemo on cycle 4 and 6 due to toxicity with the high dose methotrexate.   -- PET CT post 3 cycles CR1 and repeat PET CT post therapy completion showed CR1.     -- Follow-up scans now show ongoing CR. Plan for repeat CT scan in 4 months again and then at 1 year post therapy and then clinical exams only    2. ID:  Not on any abx at this point     3. Rheumatoid arthritis: is on prednisone 5 mg daily and plaquenil     4. CV: A. Fib. On digoxin and atenolol. Status post numerous ablations as above but best control seems to correlate with the digoxin  -- Pericardial effusion noted during hospitalization in 3/2018 and pericarditis on 3/8/2018 MRI. Repeat MRI 4/12/2018 showed ongoing pericardial effusion with the start of organization felt to be ongoing inflammation. Remains on colchicine for this. Current CT scan shows effusion that is mild to moderate thus consistent with  that seen on MRI    5. Heme: Counts good today.    5. Wound: status post surgery on 5/22.    7. Neuropathy: on gabapentin. Stable. Not worse      Final Plan:  - return to clinic in 4 months for repeat scans and follow-up    Lenore Rodriguez

## 2018-05-24 NOTE — NURSING NOTE
Reason For Visit:   Chief Complaint   Patient presents with     Surgical Followup     Left Arm Wound Excision And Closure, Possible Submuscular Transposition of Ulnar Nerve  ([facility]est[facility]) DOS 5/22/18         Pain Assessment  Patient Currently in Pain: No

## 2018-05-24 NOTE — DISCHARGE INSTRUCTIONS

## 2018-05-25 ENCOUNTER — TELEPHONE (OUTPATIENT)
Dept: FAMILY MEDICINE | Facility: CLINIC | Age: 58
End: 2018-05-25

## 2018-05-25 DIAGNOSIS — E87.5 HYPERKALEMIA: Primary | ICD-10-CM

## 2018-05-25 LAB — IGG SERPL-MCNC: 826 MG/DL (ref 695–1620)

## 2018-05-25 NOTE — TELEPHONE ENCOUNTER
Pt had surgery on left arm and provider prescribed Bactrim. Patient is on spironolactone and this has a level 2 interaction with med and can increase patients potassium level so they are required to report this to provider.  Pt is only on for a week and will be done the morning of the 29th. Does provider want to order a lav to check pts potassium level next week?  Pts heartrate was at 50 today which is low.  Please Advise.

## 2018-05-25 NOTE — TELEPHONE ENCOUNTER
Called pt and appt was set up for 7:40am on 5/30/18. Provider should have forms.  Clemencia Oliveira  CSS

## 2018-05-25 NOTE — TELEPHONE ENCOUNTER
I spoke to patient, filled out forms.  She will call if she wants them faxed or to .  Will sign and place at [facility] desk.  Gala Stringer PA-C

## 2018-05-25 NOTE — TELEPHONE ENCOUNTER
I didn't prescribe this, I assume the surgeon knew the risks when prescribed.  Bactrim was started 5/22/18. potassium 5/17/18, 5/22/18 and 5/24/18 were within normal limits and not on the high end of normal either.  At this point I would not change it. Can always recheck electrolytes next week.  I will forward this to surgeon as well.  I just talked with patient today and she is doing well, she saw surgery yesterday.  Gala Stringer PA-C

## 2018-05-25 NOTE — TELEPHONE ENCOUNTER
Forms completed so appt cancelled for 5/30/18. Pt notified and will  forms at .  Clemencia Oliveira  CSS

## 2018-05-29 ENCOUNTER — PRE VISIT (OUTPATIENT)
Dept: UROLOGY | Facility: CLINIC | Age: 58
End: 2018-05-29

## 2018-05-29 NOTE — TELEPHONE ENCOUNTER
Patient with history of intermittent hematuria and severe urgency coming in for cystoscopy. Patient chart reviewed, no need for call, all records available and ready for appointment.

## 2018-05-31 ENCOUNTER — OFFICE VISIT (OUTPATIENT)
Dept: RHEUMATOLOGY | Facility: CLINIC | Age: 58
End: 2018-05-31
Attending: INTERNAL MEDICINE
Payer: COMMERCIAL

## 2018-05-31 VITALS
WEIGHT: 113.4 LBS | SYSTOLIC BLOOD PRESSURE: 127 MMHG | DIASTOLIC BLOOD PRESSURE: 72 MMHG | OXYGEN SATURATION: 98 % | HEART RATE: 52 BPM | BODY MASS INDEX: 23.8 KG/M2 | HEIGHT: 58 IN

## 2018-05-31 DIAGNOSIS — M81.8 STEROID-INDUCED OSTEOPOROSIS: ICD-10-CM

## 2018-05-31 DIAGNOSIS — M05.712 RHEUMATOID ARTHRITIS INVOLVING BOTH SHOULDERS WITH POSITIVE RHEUMATOID FACTOR (H): Primary | ICD-10-CM

## 2018-05-31 DIAGNOSIS — M05.711 RHEUMATOID ARTHRITIS INVOLVING BOTH SHOULDERS WITH POSITIVE RHEUMATOID FACTOR (H): Primary | ICD-10-CM

## 2018-05-31 DIAGNOSIS — T38.0X5A STEROID-INDUCED OSTEOPOROSIS: ICD-10-CM

## 2018-05-31 PROCEDURE — G0463 HOSPITAL OUTPT CLINIC VISIT: HCPCS | Mod: ZF

## 2018-05-31 ASSESSMENT — PAIN SCALES - GENERAL: PAINLEVEL: NO PAIN (0)

## 2018-05-31 NOTE — MR AVS SNAPSHOT
After Visit Summary   5/31/2018    Amelia Michel    MRN: [id number]           Patient Information     Date Of Birth          1960        Visit Information        Provider Department      5/31/2018 4:30 PM Nayeli Pritchett MD [facility] Rheumatology        Today's Diagnoses     Rheumatoid arthritis involving both shoulders with positive rheumatoid factor (H)    -  1    Steroid-induced osteoporosis           Follow-ups after your visit        Follow-up notes from your care team     Return in about 6 months (around 11/30/2018).      Your next 10 appointments already scheduled     Jun 01, 2018  3:00 PM CDT   (Arrive by 2:45 PM)   Return Plastic Surgery with Kevin Sheldon MD   [facility]astic [facility] Reconstruct[facility] Surgery ([facility])    [address] [place] [address]  [address] [facility] [id number] [phone]           Do not wear perfume.            Jun 06, 2018  8:45 AM CDT   (Arrive by 8:30 AM)   Cystoscopy with Vadim Sparks MD   [facility] ([facility])    [address] [place] [address]  [address] [facility] [id number] [phone]            Aug 15, 2018 10:00 AM CDT   (Arrive by 9:45 AM)   RETURN ARRHYTHMIA with Juan Eaton MD   [facility] Heart Care ([facility])    [address] [place] [address]  [address] [place] [id number] [phone]            Sep 26, 2018 10:40 AM CDT   CT CHEST ABDOMEN PELVIS W/O & W CONTRAST with UCCT1   [facility] [place] [facility] ([facility])    [address] [place] [id number] [phone]           Please bring any scans or X-rays taken at other hospitals, if similar tests were done. Also bring a list of your medicines, including vitamins, minerals and over-the-counter drugs. It is safest to leave personal items at home.  Be sure to tell your doctor:   If you have any allergies.    If there s any chance you are pregnant.   If you are breastfeeding.  How to prepare:   Do not eat or drink for 2 hours before your exam. If you need to take medicine, you may take it with small sips of water. (We may ask you to take liquid medicine as well.)   Please wear loose clothing, such as a sweat suit or jogging clothes. Avoid snaps, zippers and other metal. We may ask you to undress and put on a hospital gown.  Please arrive 30 minutes early for your CT. Once in the department you might be asked to drink water 15-20 minutes prior to your exam.  If indicated you may be asked to drink an oral contrast in advance of your CT.  If this is the case, the imaging team will let you know or be in contact with you prior to your appointment  Patients over 70 or patients with diabetes or kidney problems:   If you haven t had a blood test (creatinine test) within the last 30 days, the Cardiologist/Radiologist may require you to get this test prior to your exam.  If you have diabetes:   Continue to take your metformin medication on the day of your exam  If you have any questions, please call the Imaging Department where you will have your exam.            Sep 26, 2018  1:15 PM CDT   [facility] with  [facility]   [facility] Draw ([facility])    [address] [place] [address]  [address]  [facility] [id number] [phone]            Sep 26, 2018  1:45 PM CDT   RETURN ONC with Lenore Rodriguez MD   [facility] Blood and Marrow Transplant ([facility])    [address] [place] [address]  [address]  [facility] [id number] [phone]            Nov 01, 2018  3:00 PM CDT   (Arrive by 2:45 PM)   Return Visit with Nayeli Pritchett MD   [facility] Rheumatology ([facility])    [address] [place] [address]  [address]  [facility] [id number] [phone]              Future tests that were ordered for you today     Open Future Orders         "Priority Expected Expires Ordered    Vitamin D Deficiency Routine  6/1/2019 6/1/2018            Who to contact     If you have questions or need follow up information about today's clinic visit or your schedule please contact [facility] RHEUMATOLOGY directly at [phone].  Normal or non-critical lab and imaging results will be communicated to you by MyChart, letter or phone within 4 business days after the clinic has received the results. If you do not hear from us within 7 days, please contact the clinic through [employer] or phone. If you have a critical or abnormal lab result, we will notify you by phone as soon as possible.  Submit refill requests through GetYourGuide or call your pharmacy and they will forward the refill request to us. Please allow 3 business days for your refill to be completed.          Additional Information About Your Visit        [employer] Information     GetYourGuide gives you secure access to your electronic health record. If you see a primary care provider, you can also send messages to your care team and make appointments. If you have questions, please call your primary care clinic.  If you do not have a primary care provider, please call [phone] and they will assist you.        Care EveryWhere ID     This is your Care EveryWhere ID. This could be used by other organizations to access your [facility] medical records  [id number]        Your Vitals Were     Pulse Height Pulse Oximetry BMI (Body Mass Index)          52 1.473 m (4' 10\") 98% 23.7 kg/m2         Blood Pressure from Last 3 Encounters:   05/31/18 127/72   05/24/18 123/68   05/22/18 118/74    Weight from Last 3 Encounters:   05/31/18 51.4 kg (113 lb 6.4 oz)   05/24/18 51.6 kg (113 lb 12.8 oz)   05/22/18 51.5 kg (113 lb 8.6 oz)               Primary Care Provider Office Phone # Fax #    Gala Stringer PA-C [phone] [address] [place] [address]        Equal Access to Services     CATINA HEAD AH: Hadii laurita " shayla Flores, watashda luqadaha, qaybta kaalzenobia bae, durga ednain hayaan franckja louise ladariusall henny. So [facility] [phone].    ATENCIÓN: Si harpreetla matt, tiene a sarmiento disposición servicios gratuitos de asistencia lingüística. Taylor al [phone].    We comply with applicable federal civil rights laws and [place] laws. We do not discriminate on the basis of race, color, national origin, age, disability, sex, sexual orientation, or gender identity.            Thank you!     Thank you for choosing [facility] RHEUMATOLOGY  for your care. Our goal is always to provide you with excellent care. Hearing back from our patients is one way we can continue to improve our services. Please take a few minutes to complete the written survey that you may receive in the mail after your visit with us. Thank you!             Your Updated Medication List - Protect others around you: Learn how to safely use, store and throw away your medicines at www.disposemymeds.org.          This list is accurate as of 5/31/18 11:59 PM.  Always use your most recent med list.                   Brand Name Dispense Instructions for use Diagnosis    acetaminophen 325 MG tablet    TYLENOL     Take 2 tablets (650 mg) by mouth every 4 hours as needed for mild pain, fever or headaches    Diffuse large B-cell lymphoma of extranodal site (H)       apixaban ANTICOAGULANT 5 MG tablet    ELIQUIS    180 tablet    Take 1 tablet (5 mg) by mouth 2 times daily    Paroxysmal atrial fibrillation (H)       ascorbic acid 500 MG Tabs     30 tablet    Take 1 tablet (500 mg) by mouth daily    Diffuse large B-cell lymphoma, unspecified body region (H)       atenolol 25 MG tablet    TENORMIN    60 tablet    Take 1 tablet (25 mg) by mouth 2 times daily    Atrial tachycardia (H)       calcium polycarbophil 625 MG tablet    FIBERCON     Take 1 tablet by mouth 2 times daily        calcium-vitamin D 600-400 MG-UNIT per tablet    CALTRATE    60 tablet    Take 2 tablets by  mouth every morning    Diffuse large B-cell lymphoma, unspecified body region (H)       colchicine 0.6 MG tablet    COLCYRS    60 tablet    Take 1 tablet (0.6 mg) by mouth daily    Pericarditis       digoxin 125 MCG tablet    LANOXIN    90 tablet    Take 250mcg daily for 2 days, then decrease to 125mcg daily    Atrial flutter, unspecified type (H)       furosemide 20 MG tablet    LASIX    90 tablet    Take 1 tablet (20 mg) by mouth daily    Chronic systolic heart failure (H)       gabapentin 100 MG capsule    NEURONTIN    180 capsule    TAKE TWO CAPSULES BY MOUTH THREE TIMES A DAY    Critical illness myopathy       HYDROCORTISONE PO      Take 100 mg by mouth IV        hydroxychloroquine 200 MG tablet    PLAQUENIL    60 tablet    Take 1 tablet (200 mg) by mouth 2 times daily    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 (H)       multivitamin, therapeutic with minerals Tabs tablet     30 each    Take 1 tablet by mouth daily    Diffuse large B-cell lymphoma, unspecified body region (H)       order for DME     1 Device    Equipment being ordered: BP cuff    Hypertension, unspecified type       order for DME     1 Units    Equipment being ordered: Thigh high compression stockings Class 2: off the shelf or custom, farrow foot wrap and juxta fit premium lower leg wrap, lymphedema bandaging supplies 3 sets    Lymphedema of both lower extremities       oxyCODONE IR 5 MG tablet    ROXICODONE    30 tablet    Take 1-2 tablets (5-10 mg) by mouth every 4 hours as needed for moderate to severe pain or other    Wound of left upper extremity, sequela       predniSONE 5 MG tablet    DELTASONE    90 tablet    Take 1 tablet (5 mg) by mouth daily    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 (H)       spironolactone 25 MG tablet    ALDACTONE    30 tablet    Take 1 tablet (25 mg) by mouth daily    Acute on chronic diastolic heart failure (H)        sulfamethoxazole-trimethoprim 800-160 MG per tablet    BACTRIM DS/SEPTRA DS    14 tablet    Take 1 tablet by mouth 2 times daily    Wound of left upper extremity, sequela       tolterodine 4 MG 24 hr capsule    DETROL LA    30 capsule    Take 1 capsule (4 mg) by mouth daily    Urinary urgency, Urge incontinence

## 2018-05-31 NOTE — NURSING NOTE
"Chief Complaint   Patient presents with     RECHECK     RA      /72  Pulse 52  Ht 1.473 m (4' 10\")  Wt 51.4 kg (113 lb 6.4 oz)  SpO2 98%  BMI 23.7 kg/m2  Mary Bruner, SYED    "

## 2018-05-31 NOTE — LETTER
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  - continue prednisone 5 mg daily, may consider tapering dose, currently will keep the same considering ongoing concerns of (mild) pericardial effusion. Would consider RA dosing of rutixumab going forward if RA disease activity worsens.  - Continue  mg bid; has a plan to get eye exam this summer    Prolonged exposure to steroids:  - Pre-DEXA FRAX score is 14. Should get DEXA this year (once insurance situation permits) to evaluate bone density and consider whether bisphosphonate or alternative is appropriate (had 7-8 years of Fosamax in early s).  - Need to review vitamin D intake: takes 1 caltrate/day, not sure if takes a multivitamin also.   - Vitamin D level ordered for next labs        Follow-up: 3-6 moths    HPI:   Amelia Michel is a 57 year old female with a history of rheumatoid arthritis who presents for follow up. She was last seen by Dr. Cuevas on 3/30/18 where her rheumatoid arthritis appeared to be under good control. Her plan at this time was to continue prednisone 5mg daily and HCQ  200mg twice a day. She was told to have an eye exam and follow up in 3 months. It was recommended that she get a DEXA scan to evaluate whether bisphosphonate was indicated (had 7-8 years of Fosamax in early 2000's). Previous use of methotrexate has been linked to her development of a severe neuropathy in the digits of her upper and lower extremities. She has also previously used arava.     Today, the patient reports spraining her ankle prior to her cardiac arrest, which caused some neuropathy in her sprained foot. Therefore she is currently wearing an ankle brace and using a walker. However, her arthritis is doing fine she reports that the colchicine she was recently prescribed has been very effective. She mentions that she is switching insurance companies and had an unexpected arm surgery, which has prevented her from getting an eye exam that she is aware of being overdue for. She was also laid off recently but was able to fill the majority of her medications and made the most of her previous insurance so she should be able to manage for a while. She has undergone multiple CT scans and is currently finished with chemotherapy. Lymphoma is in remission and her heart is doing fine. She is interested in the options she has for medications that manage her rheumatoid arthritis and her lymphoma. She has taken many medications including plaquenil, prednisone, arava, methotrexate, orencia, and rituximab, and has plenty of options to choose from if she wants to lower the cost of prescriptions or avoid unwanted side-effects.    Review of Systems:   Pertinent items are noted in HPI, remainder of complete ROS is negative.      Active Medications:      acetaminophen (TYLENOL) 325 MG tablet, Take 2 tablets (650 mg) by mouth every 4 hours as needed for mild pain, fever or headaches, Disp: , Rfl:      apixaban ANTICOAGULANT (ELIQUIS) 5 MG tablet, Take 1 tablet (5 mg) by mouth 2 times daily, Disp: 180 tablet, Rfl: 3     ascorbic  acid 500 MG TABS, Take 1 tablet (500 mg) by mouth daily, Disp: 30 tablet, Rfl: 0     atenolol (TENORMIN) 25 MG tablet, Take 1 tablet (25 mg) by mouth 2 times daily, Disp: 60 tablet, Rfl: 3     calcium polycarbophil (FIBERCON) 625 MG tablet, Take 1 tablet by mouth 2 times daily, Disp: , Rfl:      calcium-vitamin D (CALTRATE) 600-400 MG-UNIT per tablet, Take 2 tablets by mouth every morning, Disp: 60 tablet, Rfl: 0     colchicine 0.6 MG tablet, Take 1 tablet (0.6 mg) by mouth daily, Disp: 60 tablet, Rfl: 0     digoxin (LANOXIN) 125 MCG tablet, Take 250mcg daily for 2 days, then decrease to 125mcg daily, Disp: 90 tablet, Rfl: 3     furosemide (LASIX) 20 MG tablet, Take 1 tablet (20 mg) by mouth daily, Disp: 90 tablet, Rfl: 3     gabapentin (NEURONTIN) 100 MG capsule, TAKE TWO CAPSULES BY MOUTH THREE TIMES A DAY, Disp: 180 capsule, Rfl: 3     HYDROCORTISONE PO, Take 100 mg by mouth IV, Disp: , Rfl:      hydroxychloroquine (PLAQUENIL) 200 MG tablet, Take 1 tablet (200 mg) by mouth 2 times daily, Disp: 60 tablet, Rfl: 5     multivitamin, therapeutic with minerals (THERA-VIT-M) TABS tablet, Take 1 tablet by mouth daily, Disp: 30 each, Rfl: 0     order for DME, Equipment being ordered: Thigh high compression stockings Class 2: off the shelf or custom, farrow foot wrap and juxta fit premium lower leg wrap, lymphedema bandaging supplies 3 sets, Disp: 1 Units, Rfl: 1     order for DME, Equipment being ordered: BP cuff, Disp: 1 Device, Rfl: 1     oxyCODONE IR (ROXICODONE) 5 MG tablet, Take 1-2 tablets (5-10 mg) by mouth every 4 hours as needed for moderate to severe pain or other (Patient not taking: Reported on 5/31/2018), Disp: 30 tablet, Rfl: 0     predniSONE (DELTASONE) 5 MG tablet, Take 1 tablet (5 mg) by mouth daily, Disp: 90 tablet, Rfl: 2     spironolactone (ALDACTONE) 25 MG tablet, Take 1 tablet (25 mg) by mouth daily, Disp: 30 tablet, Rfl: 3     sulfamethoxazole-trimethoprim (BACTRIM DS/SEPTRA DS) 800-160 MG per  "tablet, Take 1 tablet by mouth 2 times daily (Patient not taking: Reported on 5/31/2018), Disp: 14 tablet, Rfl: 0     tolterodine (DETROL LA) 4 MG 24 hr capsule, Take 1 capsule (4 mg) by mouth daily, Disp: 30 capsule, Rfl: 11      Allergies:   Blood transfusion related (informational only); Metoprolol; No clinical screening - see comments; Penicillins; and Tape [adhesive tape]      Past Medical History:  Antiplatelet or antithrombotic long-term use  Arrhythmia  Atrial tachycardia, flutter  Paroxysmal atrial fibrillation  Arthritis  Lymphoma  Cardiac arrest  history of chemotherapy  Primary pulmonary hypertension  Neuropathy  Postoperative nausea and vomiting  Rheumatoid arthritis  Hyperlipidemia LDL goal <130  Lymphedema of both lower extremities  Cardiogenic shock  Acute respiratory failure with hypoxia  Critical illness myopathy  Sepsis  Wound of left upper extremity  Diffuse large B-cell lymphoma of extranodal site  Febrile neutropenia  Physical deconditioning  Palpitations     Past Surgical History:  Anesthesia cardioversion  Right wrist arthrodesis  Bone marrow aspirate & biopsy  Excise lesion upper extremity - left ar wound excision and closure, possible submuscular transposition of ulnar nerve  Foot surgery - 4 left, 2 right  Carpal tunnel bilateral release  Repair ventricular septal defect  Transpositional ulnar nerve (elbow)    Family History:   Mother - breast cancer, hypertension, alzheimer disease  Father - hypertension, lymphoma, circulatory A fib  Paternal grandmother - diabetes     Social History:   No smoking or tobacco use  No alcohol use     Physical Exam:   /72  Pulse 52  Ht 1.473 m (4' 10\")  Wt 51.4 kg (113 lb 6.4 oz)  SpO2 98%  BMI 23.7 kg/m2   Wt Readings from Last 4 Encounters:   05/31/18 51.4 kg (113 lb 6.4 oz)   05/24/18 51.6 kg (113 lb 12.8 oz)   05/22/18 51.5 kg (113 lb 8.6 oz)   05/17/18 51.7 kg (113 lb 14.4 oz)   Constitutional: Well-developed, appearing stated age; " cooperative  Eyes: Normal EOM, PERRLA, vision, conjunctiva, sclera  ENT: Normal external ears, nose, hearing, lips, teeth, gums  Neck: No mass or thyroid enlargement    : Not tested  Lymph: No cervical, supraclavicular, or epitrochlear nodes  MS:  No active synovitis. Has subluxation of MCP's 2-4 bilaterally. Diminished flexion greater than extension left wrist. Right wrist partially fused.   Skin: No nail pitting, alopecia, rash, nodules or lesions; + hair loss  Neuro: + chronic polyneuropathy with deminished sensation in BL LE.   Psych: Normal judgement, orientation, memory, affect.     Laboratory:   I reviewed the following labs:  Last CBC:  Lab Results   Component Value Date    WBC 3.2 05/24/2018    WBC 2.4 05/17/2018    WBC 3.4 03/23/2018     Lab Results   Component Value Date    RBC 4.13 05/24/2018     Lab Results   Component Value Date    HGB 15.0 05/24/2018    HGB 15.1 05/22/2018    HGB 15.7 05/17/2018     Lab Results   Component Value Date    HCT 42.7 05/24/2018     No components found for: MCT  Lab Results   Component Value Date     05/24/2018     Lab Results   Component Value Date    MCH 36.3 05/24/2018     Lab Results   Component Value Date    MCHC 35.1 05/24/2018     Lab Results   Component Value Date    RDW 12.4 05/24/2018     Lab Results   Component Value Date     05/24/2018     05/17/2018    PLT 87 03/23/2018       Last Basic Metabolic Panel:  Lab Results   Component Value Date     05/24/2018      Lab Results   Component Value Date    POTASSIUM 3.7 05/24/2018     Lab Results   Component Value Date    CHLORIDE 104 05/24/2018     Lab Results   Component Value Date    VIKTORIYA 8.2 05/24/2018     Lab Results   Component Value Date    CO2 20 05/24/2018     Lab Results   Component Value Date    BUN 18 05/24/2018     Lab Results   Component Value Date    CR 0.92 05/24/2018     Lab Results   Component Value Date    GLC 97 05/24/2018       No results found for: CKTOTAL  TSH   Date  Value Ref Range Status   05/15/2017 1.03 0.40 - 4.00 mU/L Final   ]  Lab Results   Component Value Date    URIC 6.5 03/11/2018    URIC 2.3 08/07/2017    URIC 3.5 08/04/2017     Lab Results   Component Value Date    CRP 63.0 03/08/2018    CRP 6.7 01/12/2018    CRP 83.0 07/14/2017       Liver Function Studies -   Recent Labs   Lab Test  05/24/18   1506  05/17/18   1225  03/23/18   0945   PROTTOTAL  6.2*  7.9  6.2*   ALBUMIN  3.4  4.4  3.7   BILITOTAL  0.6  1.0  0.7   ALKPHOS  139  204*  136   AST  33  43  28   ALT  38  46  57*       UA RESULTS:  Recent Labs   Lab Test  03/01/18   0930 03/01/18   COLOR  Yellow  Yellow   APPEARANCE  Slightly Cloudy  Clear   URINEGLC  Negative  Neg   URINEBILI  Negative  Neg   URINEKETONE  Negative  Neg   SG  1.004  1.005   UBLD  Large*  Large   URINEPH  6.0  5.5   PROTEIN  30*  30    UROBILINOGEN   --   0.2   NITRITE  Negative  Neg   LEUKEST  Small*  Small   RBCU  3*   --    WBCU  23*   --        Autoimmunity labs:  Lab Results   Component Value Date    RHF <20 06/10/2017     Lab Results   Component Value Date    CCPIGG 331 (H) 06/10/2017       No results found for: ANCA  Lab Results   Component Value Date    X4QUOUR 113 06/10/2017    V0FZYBM 131 04/27/2016     Lab Results   Component Value Date    L6HAVSR 25 06/10/2017     Lab Results   Component Value Date    MARYANA <1.0  Interpretation:  Negative   06/10/2017     Lab Results   Component Value Date    DNA 3 06/10/2017     Lab Results   Component Value Date    RNPIGG  06/10/2017     <0.2  Negative   Antibody index (AI) values reflect qualitative changes in antibody   concentration that cannot be directly associated with clinical condition or   disease state.      SMIGG  06/10/2017     <0.2  Negative   Antibody index (AI) values reflect qualitative changes in antibody   concentration that cannot be directly associated with clinical condition or   disease state.      SSAIGG  06/10/2017     <0.2  Negative   Antibody index (AI) values  reflect qualitative changes in antibody   concentration that cannot be directly associated with clinical condition or   disease state.      SSBIGG  06/10/2017     <0.2  Negative   Antibody index (AI) values reflect qualitative changes in antibody   concentration that cannot be directly associated with clinical condition or   disease state.      SCLIGG  06/10/2017     <0.2  Negative   Antibody index (AI) values reflect qualitative changes in antibody   concentration that cannot be directly associated with clinical condition or   disease state.         Scribe Disclosure:   I, Cristóbal Michele, am serving as a scribe to document services personally performed by Nayeli Pritchett MD at this visit, based upon the provider's statements to me. All documentation has been reviewed by the aforementioned provider prior to being entered into the official medical record.     Portions of this medical record were completed by a scribe. UPON MY REVIEW AND AUTHENTICATION BY ELECTRONIC SIGNATURE, this confirms (a) I performed the applicable clinical services, and (b) the record is accurate.        I reviewed and edited the above scribed note to reflect my findings and plan.     I discussed the findings and recommendations with the patient.  Recommendations were discussed with the consulting team.  Time spent: 40 minutes    Nayeli Pritchett MD, MS  Rheumatology Attending Physician  [facility] [phone]      Nayeli Pritchett MD

## 2018-06-01 ENCOUNTER — OFFICE VISIT (OUTPATIENT)
Dept: PLASTIC SURGERY | Facility: CLINIC | Age: 58
End: 2018-06-01
Payer: COMMERCIAL

## 2018-06-01 DIAGNOSIS — S41.102S WOUND OF LEFT UPPER EXTREMITY, SEQUELA: Primary | ICD-10-CM

## 2018-06-01 NOTE — LETTER
6/1/2018     RE: Amelia Michel  [address]  [place] [address]     Dear Colleague,    Thank you for referring your patient, Amelia Michel, to the [facility] PLASTIC [facility] RECONSTRU[facility]IVE SURGERY at [facility]. Please see a copy of my visit note below.    Patient returns for a postoperative follow-up after left upper extremity chronic wound excision.    INTERVAL HISTORY: Pain is well-controlled. Patient continues to be dull along the medial forearm. Left index and long finger tingling is resolved. However, continues to have left thumb tip issues.    PHYSICAL EXAMINATION:  Gen.: No acute distress.  Left upper extremity incision site is well healed. Sutures are still intact. There is no palpable fluctuance underneath. Patient is able to flex and extend the elbow completely. She is able to make a composite fist and extend all digits. Sensation to light touch is intact in all III nerve distributions. Continues to be dull along the medial forearm.    Pathology report is still pending.    ASSESSMENT: 1.5 weeks status post excision of left upper extremity chronic wound and fat necrosis.    PLAN: Sutures were removed today. No activity restrictions for the patient. Given that the patient has improved symptoms in her left index and long fingers, I'm hopeful that the left thumb will improve. In terms of the medial forearm dullness, we will monitor this. Patient will see me back in 8 weeks.    Total time spent with patient was 15 min of which greater than 50% was in counseling.      Again, thank you for allowing me to participate in the care of your patient.      Sincerely,    Kevin Sheldon MD

## 2018-06-01 NOTE — MR AVS SNAPSHOT
After Visit Summary   6/1/2018    Amelia Michel    MRN: [id number]           Patient Information     Date Of Birth          1960        Visit Information        Provider Department      6/1/2018 3:00 PM Kevin Sheldon MD [facility] Plastic and Reconstructive Surgery        Today's Diagnoses     Wound of left upper extremity, sequela    -  1       Follow-ups after your visit        Follow-up notes from your care team     Return in about 8 weeks (around 7/27/2018).      Your next 10 appointments already scheduled     Jun 06, 2018  8:45 AM CDT   (Arrive by 8:30 AM)   Cystoscopy with Vadim Sparks MD   [facility] Urology and [facility] ([facility] [place])    [address]  4th Floor  [place] [address] [phone]            Aug 15, 2018 10:00 AM CDT   (Arrive by 9:45 AM)   RETURN ARRHYTHMIA with Juan Eaton MD   [facility] ([facility] [place])    [address]  [address]  [place] [address] [phone]            Sep 26, 2018 10:40 AM CDT   CT CHEST ABDOMEN PELVIS W/O & W CONTRAST with UCCT1   [facility] Imaging [place] [facility] ([facility] [place])    [address]  1st Floor  [place] [address] [phone]           Please bring any scans or X-rays taken at other hospitals, if similar tests were done. Also bring a list of your medicines, including vitamins, minerals and over-the-counter drugs. It is safest to leave personal items at home.  Be sure to tell your doctor:   If you have any allergies.   If there s any chance you are pregnant.   If you are breastfeeding.  How to prepare:   Do not eat or drink for 2 hours before your exam. If you need to take medicine, you may take it with small sips of water. (We may ask you to take liquid medicine as well.)   Please wear loose clothing, such as a sweat suit or jogging clothes. Avoid snaps, zippers and other metal. We  may ask you to undress and put on a hospital gown.  Please arrive 30 minutes early for your CT. Once in the department you might be asked to drink water 15-20 minutes prior to your exam.  If indicated you may be asked to drink an oral contrast in advance of your CT.  If this is the case, the imaging team will let you know or be in contact with you prior to your appointment  Patients over 70 or patients with diabetes or kidney problems:   If you haven t had a blood test (creatinine test) within the last 30 days, the Cardiologist/Radiologist may require you to get this test prior to your exam.  If you have diabetes:   Continue to take your metformin medication on the day of your exam  If you have any questions, please call the Imaging Department where you will have your exam.            Sep 26, 2018  1:15 PM CDT   [facility] with  [facility]   [facility] ([facility])    [address]  [address]  [place] [id number] [phone]            Sep 26, 2018  1:45 PM CDT   RETURN ONC with Lenore Rodriguez MD   [facility] Blood and Marrow Transplant ([facility])    [address]  [address]  [place] [id number] [phone]            Nov 01, 2018  3:00 PM CDT   (Arrive by 2:45 PM)   Return Visit with Nayeli Pritchett MD   [facility] Rheumatology ([facility])    [address]  [address]  [place] [id number] [phone]              Future tests that were ordered for you today     Open Future Orders        Priority Expected Expires Ordered    Vitamin D Deficiency Routine  6/1/2019 6/1/2018            Who to contact     Please call your clinic at [phone] to:    Ask questions about your health    Make or cancel appointments    Discuss your medicines    Learn about your test results    Speak to your doctor            Additional Information About Your Visit         [employer] Information     [email or web address] gives you secure access to your electronic health record. If you see a primary care provider, you can also send messages to your care team and make appointments. If you have questions, please call your primary care clinic.  If you do not have a primary care provider, please call [phone] and they will assist you.      [email or web address] is an electronic gateway that provides easy, online access to your medical records. With [email or web address], you can request a clinic appointment, read your test results, renew a prescription or communicate with your care team.     To access your existing account, please contact your [facility] or call [phone] for assistance.        Care EveryWhere ID     This is your Care EveryWhere ID. This could be used by other organizations to access your [place] medical records  [id number]         Blood Pressure from Last 3 Encounters:   05/31/18 127/72   05/24/18 123/68   05/22/18 118/74    Weight from Last 3 Encounters:   05/31/18 113 lb 6.4 oz   05/24/18 113 lb 12.8 oz   05/22/18 113 lb 8.6 oz              Today, you had the following     No orders found for display       Primary Care Provider Office Phone # Fax #    Gala Stringer PA-C [phone] [address] [address] [facility] [address]        Equal Access to Services     SARAH HEAD : Hadii aad ku hadasho Soomaali, waaxda luqadaha, qaybta kaalmada adeegyada, durga price haynegin hermosillo . So [facility] [place] [facility] [phone].    ATENCIÓN: Si habla español, tiene a sarmiento disposición servicios gratuitos de asistencia lingüística. Llame al [phone].    We comply with applicable federal civil rights laws and [place] laws. We do not discriminate on the basis of race, color, national origin, age, disability, sex, sexual orientation, or gender identity.            Thank you!     Thank you for choosing [facility] PLASTIC AND RECONSTRUCTIVE SURGERY  for your care. Our goal is  always to provide you with excellent care. Hearing back from our patients is one way we can continue to improve our services. Please take a few minutes to complete the written survey that you may receive in the mail after your visit with us. Thank you!             Your Updated Medication List - Protect others around you: Learn how to safely use, store and throw away your medicines at www.disposemymeds.org.          This list is accurate as of 6/1/18  4:05 PM.  Always use your most recent med list.                   Brand Name Dispense Instructions for use Diagnosis    acetaminophen 325 MG tablet    TYLENOL     Take 2 tablets (650 mg) by mouth every 4 hours as needed for mild pain, fever or headaches    Diffuse large B-cell lymphoma of extranodal site (H)       apixaban ANTICOAGULANT 5 MG tablet    ELIQUIS    180 tablet    Take 1 tablet (5 mg) by mouth 2 times daily    Paroxysmal atrial fibrillation (H)       ascorbic acid 500 MG Tabs     30 tablet    Take 1 tablet (500 mg) by mouth daily    Diffuse large B-cell lymphoma, unspecified body region (H)       atenolol 25 MG tablet    TENORMIN    60 tablet    Take 1 tablet (25 mg) by mouth 2 times daily    Atrial tachycardia (H)       calcium polycarbophil 625 MG tablet    FIBERCON     Take 1 tablet by mouth 2 times daily        calcium-vitamin D 600-400 MG-UNIT per tablet    CALTRATE    60 tablet    Take 2 tablets by mouth every morning    Diffuse large B-cell lymphoma, unspecified body region (H)       colchicine 0.6 MG tablet    COLCYRS    60 tablet    Take 1 tablet (0.6 mg) by mouth daily    Pericarditis       digoxin 125 MCG tablet    LANOXIN    90 tablet    Take 250mcg daily for 2 days, then decrease to 125mcg daily    Atrial flutter, unspecified type (H)       furosemide 20 MG tablet    LASIX    90 tablet    Take 1 tablet (20 mg) by mouth daily    Chronic systolic heart failure (H)       gabapentin 100 MG capsule    NEURONTIN    180 capsule    TAKE TWO CAPSULES BY  MOUTH THREE TIMES A DAY    Critical illness myopathy       HYDROCORTISONE PO      Take 100 mg by mouth IV        hydroxychloroquine 200 MG tablet    PLAQUENIL    60 tablet    Take 1 tablet (200 mg) by mouth 2 times daily    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 (H)       multivitamin, therapeutic with minerals Tabs tablet     30 each    Take 1 tablet by mouth daily    Diffuse large B-cell lymphoma, unspecified body region (H)       order for DME     1 Device    Equipment being ordered: BP cuff    Hypertension, unspecified type       order for DME     1 Units    Equipment being ordered: Thigh high compression stockings Class 2: off the shelf or custom, farrow foot wrap and juxta fit premium lower leg wrap, lymphedema bandaging supplies 3 sets    Lymphedema of both lower extremities       oxyCODONE IR 5 MG tablet    ROXICODONE    30 tablet    Take 1-2 tablets (5-10 mg) by mouth every 4 hours as needed for moderate to severe pain or other    Wound of left upper extremity, sequela       predniSONE 5 MG tablet    DELTASONE    90 tablet    Take 1 tablet (5 mg) by mouth daily    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 (H)       spironolactone 25 MG tablet    ALDACTONE    30 tablet    Take 1 tablet (25 mg) by mouth daily    Acute on chronic diastolic heart failure (H)       sulfamethoxazole-trimethoprim 800-160 MG per tablet    BACTRIM DS/SEPTRA DS    14 tablet    Take 1 tablet by mouth 2 times daily    Wound of left upper extremity, sequela       tolterodine 4 MG 24 hr capsule    DETROL LA    30 capsule    Take 1 capsule (4 mg) by mouth daily    Urinary urgency, Urge incontinence

## 2018-06-01 NOTE — PROGRESS NOTES
Patient returns for a postoperative follow-up after left upper extremity chronic wound excision.    INTERVAL HISTORY: Pain is well-controlled. Patient continues to be dull along the medial forearm. Left index and long finger tingling is resolved. However, continues to have left thumb tip issues.    PHYSICAL EXAMINATION:  Gen.: No acute distress.  Left upper extremity incision site is well healed. Sutures are still intact. There is no palpable fluctuance underneath. Patient is able to flex and extend the elbow completely. She is able to make a composite fist and extend all digits. Sensation to light touch is intact in all III nerve distributions. Continues to be dull along the medial forearm.    Pathology report is still pending.    ASSESSMENT: 1.5 weeks status post excision of left upper extremity chronic wound and fat necrosis.    PLAN: Sutures were removed today. No activity restrictions for the patient. Given that the patient has improved symptoms in her left index and long fingers, I'm hopeful that the left thumb will improve. In terms of the medial forearm dullness, we will monitor this. Patient will see me back in 8 weeks.    Total time spent with patient was 15 min of which greater than 50% was in counseling.

## 2018-06-06 ENCOUNTER — OFFICE VISIT (OUTPATIENT)
Dept: UROLOGY | Facility: CLINIC | Age: 58
End: 2018-06-06
Payer: COMMERCIAL

## 2018-06-06 VITALS
BODY MASS INDEX: 23.72 KG/M2 | WEIGHT: 113 LBS | SYSTOLIC BLOOD PRESSURE: 120 MMHG | HEART RATE: 68 BPM | DIASTOLIC BLOOD PRESSURE: 88 MMHG | HEIGHT: 58 IN

## 2018-06-06 DIAGNOSIS — N39.41 URGE INCONTINENCE: Primary | ICD-10-CM

## 2018-06-06 LAB — COPATH REPORT: NORMAL

## 2018-06-06 ASSESSMENT — PAIN SCALES - GENERAL
PAINLEVEL: NO PAIN (0)
PAINLEVEL: NO PAIN (0)

## 2018-06-06 NOTE — NURSING NOTE
"Chief Complaint   Patient presents with     Cystoscopy     intermittent hematuria and severe urgency       Blood pressure 120/88, pulse 68, height 1.473 m (4' 10\"), weight 51.3 kg (113 lb), not currently breastfeeding. Body mass index is 23.62 kg/(m^2).    Patient Active Problem List   Diagnosis     HTN (hypertension)     Primary pulmonary hypertension (H)     Hyperlipidemia LDL goal <130     RA (rheumatoid arthritis) (H)     Lymphedema of both lower extremities     Atrial tachycardia (H)     Cardiogenic shock (H)     Acute respiratory failure with hypoxia (H)     Hypertension     Critical illness myopathy     Sepsis (H)     Wound of left upper extremity     Diffuse large B-cell lymphoma of extranodal site (H)     Diffuse large B cell lymphoma (H)     Febrile neutropenia (H)     Physical deconditioning     Health Care Home     DLBCL (diffuse large B cell lymphoma) (H)     H/O cardiac arrest     Palpitations     Paroxysmal atrial fibrillation (H)     Atrial flutter, unspecified type (H)       Allergies   Allergen Reactions     Blood Transfusion Related (Informational Only) Hives     Hives with platelets. Give benadryl premedication.     Metoprolol Other (See Comments)     Pt and  report that metoprolol does not work for her and also reports feeling unwell with this medication. She has been able to tolerate atenolol, which as worked in controlling her HR.      No Clinical Screening - See Comments      Penicillin Allergy Skin Test not performed, see antimicrobial management team progress note 7/5/17.       Penicillins      Tape [Adhesive Tape] Rash       Current Outpatient Prescriptions   Medication Sig Dispense Refill     acetaminophen (TYLENOL) 325 MG tablet Take 2 tablets (650 mg) by mouth every 4 hours as needed for mild pain, fever or headaches       apixaban ANTICOAGULANT (ELIQUIS) 5 MG tablet Take 1 tablet (5 mg) by mouth 2 times daily 180 tablet 3     ascorbic acid 500 MG TABS Take 1 tablet (500 mg) by " mouth daily 30 tablet 0     atenolol (TENORMIN) 25 MG tablet Take 1 tablet (25 mg) by mouth 2 times daily 60 tablet 3     calcium polycarbophil (FIBERCON) 625 MG tablet Take 1 tablet by mouth 2 times daily       calcium-vitamin D (CALTRATE) 600-400 MG-UNIT per tablet Take 2 tablets by mouth every morning 60 tablet 0     colchicine 0.6 MG tablet Take 1 tablet (0.6 mg) by mouth daily 60 tablet 0     digoxin (LANOXIN) 125 MCG tablet Take 250mcg daily for 2 days, then decrease to 125mcg daily 90 tablet 3     furosemide (LASIX) 20 MG tablet Take 1 tablet (20 mg) by mouth daily 90 tablet 3     gabapentin (NEURONTIN) 100 MG capsule TAKE TWO CAPSULES BY MOUTH THREE TIMES A  capsule 3     HYDROCORTISONE PO Take 100 mg by mouth IV       hydroxychloroquine (PLAQUENIL) 200 MG tablet Take 1 tablet (200 mg) by mouth 2 times daily 60 tablet 5     multivitamin, therapeutic with minerals (THERA-VIT-M) TABS tablet Take 1 tablet by mouth daily 30 each 0     order for DME Equipment being ordered: Thigh high compression stockings Class 2: off the shelf or custom, farrow foot wrap and juxta fit premium lower leg wrap, lymphedema bandaging supplies 3 sets 1 Units 1     order for DME Equipment being ordered: BP cuff 1 Device 1     predniSONE (DELTASONE) 5 MG tablet Take 1 tablet (5 mg) by mouth daily 90 tablet 2     spironolactone (ALDACTONE) 25 MG tablet Take 1 tablet (25 mg) by mouth daily 30 tablet 3     tolterodine (DETROL LA) 4 MG 24 hr capsule Take 1 capsule (4 mg) by mouth daily 30 capsule 11       Social History   Substance Use Topics     Smoking status: Never Smoker     Smokeless tobacco: Never Used     Alcohol use Yes      Comment: none     Invasive Procedure Safety Checklist:    Procedure: cystoscopy    Action: Complete sections and checkboxes as appropriate.    Pre-procedure:  1. Patient ID Verified with 2 identifiers ([place] and  or MRN) : YES    2. Procedure and site verified with patient/designee (when able) :  YES    3. Accurate consent documentation in medical record : YES    4. H&P (or appropriate assessment) documented in medical record : NO  H&P must be up to 30 days prior to procedure an updated within 24 hours of                 Procedure as applicable.     5. Relevant diagnostic and radiology test results appropriately labeled and displayed as applicable : NO    6. Blood products, implants, devices, and/or special equipment available for the procedure as applicable : NO    7. Procedure site(s) marked with provider initials [Exclusions: none] : NO    8. Marking not required. Reason : Yes  Procedure does not require site marking    Time Out:     Time-Out performed immediately prior to starting procedure, including verbal and active participation of all team members addressing: YES    1. Correct patient identity.  2. Confirmed that the correct side and site are marked.  3. An accurate procedure to be done.  4. Agreement on the procedure to be done.  5. Correct patient position.  6. Relevant images and results are properly labeled and appropriately displayed.  7. The need to administer antibiotics or fluids for irrigation purposes during the procedure as applicable.  8. Safety precautions based on patient history or medication use.    During Procedure: Verification of correct person, site, and procedure occurs any time the responsibility for care of the patient is transferred to another member of the care team.    Chelo Lee LPN  6/6/2018  9:05 AM    The following medication was given:     MEDICATION:  Lidocaine without epinephrine  ROUTE: urethral  SITE: urethral  DOSE: 10 mL  LOT #: [id number]  : [employer]  EXPIRATION DATE: 2/2020  NDC#: [address]-0   Was there drug waste? No      Chelo Lee LPN  June 6, 2018

## 2018-06-06 NOTE — PROGRESS NOTES
Reason for Visit:  Cystoscopy    Clinical Data: Ms. Amelia Michel is a 57 year old female with a hx of urinary urgency and urge incontinence.  She is currently on Detrol and this helps a little but most of her incontinence is at night.  She underwent UDS and presents for cystoscopy.  She has a PMH of diffuse large B cell lymphoma currently receiving chemotherapy, h/o cardiac arrest, DVT on chronic anticoagulation (currently with Lovenox), RA (on prednisone), and HTN.    UDS from 3/1/18:    -Multiple episodes of DO starting at bladder volumes of 50 mL which the patient is unable to suppress. She leaks almost the entire contents of her bladder with Pdet pressures ranging into the mid 30's cm H2O.  -Fair/poor bladder compliance  -Small bladder capacity (residential <130 mL)  -Increased sensations  -No stress incontinence, but DO with incontinence as described above.   -Detrusor activity during voiding: fair/good detrusor contraction to 30.6 cm H2O with final PVR ~50 mL  -Detrusor sphincter dyssenergia: some increased EMG activity during voiding; unclear whether this is secondary to true DSD (no known neurological etiology) or voiding dysfunction.  -Fluoroscopy reveals a moderately trabeculated bladder wall with some cellules and diverticuli. No vesicoureteral reflux was observed.  The bladder neck was closed during filling and open during voiding and times of incontinence.    Cystoscopy procedure:  Pt. Was consented and placed in the lithotomy position.  She was cleaned and preparred in the usual fashion.  Lidocain gel was inserted into the urethra and given time to take effect.  A 16 fr flexible cystoscope was then inserted through the urethra and into the bladder.  The urethra was wnl.  The bladder was with 1-2+ trabeculation.  There is some quivering of the bladder wall as it is filled.  No tumors, diverticulae, or stones.  Bilateral u/o's were effluxing clear urine.  The cystoscope was then withdrawn.  The pt. Tolerated  the procedure well.    A/P:  57 year old female with overactive bladder, urinary urgency, and urge incontinence.  We discussed different options including other medication, botox, interstim, and PTNS.  She is most interested in the botox for various medical reasons but would like to think about it a bit.  -arrange for phone viist in 1 month    Thank you for allowing me to participate in the care of  Ms. Amelia Michel and I will keep you updated on her progress.    Vadim Sparks MD

## 2018-06-06 NOTE — MR AVS SNAPSHOT
After Visit Summary   6/6/2018    Amelia Michel    MRN: [id number]           Patient Information     Date Of Birth          1960        Visit Information        Provider Department      6/6/2018 8:45 AM Vadim Sparks MD [facility] and [facility] Urologic C[facility]        Today's Diagnoses     Urge incontinence    -  1       Follow-ups after your visit        Your next 10 appointments already scheduled     Jul 25, 2018  8:45 AM CDT   Telephone Call with Vadim Sparks MD   [facility] U[facility] and [facility]rolog[facility] ([facility] [place])    [address] [place] [address]  4th [facility] [address] [phone]           Note: this is not an onsite visit; there is no need to come to the facility.  Thank you for choosing [facility] for your health care needs. Below is some information for patients who are interested in having their follow-up visit with a physician by telephone. In some cases, a telephone visit can be an effective and convenient way to manage your follow-up care. Choosing a telephone visit rather than a face to face visit for your follow-up care is a decision that you and your physician can make together to ensure it meets all of your needs.  A face to face visit is always an available option, if you choose to do so.  We want to make sure you have all of the information you need about the telephone visit option and answer all of your questions before you decide to schedule a telephone follow-up visit. If you have any questions, you may talk to a staff member or our financial counselor at [phone].  1. General overview Our clinic sees patients for a variety of conditions and concerns. A face to face visit with your doctor is required for any new concerns or for your initial visit. If you and your doctor decide that a follow up visit by telephone is appropriate, you may decide to opt for a  telephone visit.   2. Billing and insurance coverage There is a charge for telephone visits, similar to the charge for an in-person visit. Your bill is based on the amount of time you and your physician are on the phone. We will bill each visit to your insurance company (just like your other medical visits), and you will be responsible for any costs not paid by your insurance company. The charge may be denied by your insurance company, in which case you will be responsible for the entire amount billed. The decision to cover the cost is determined by your insurance company. If you want to know what your insurance company will cover, we encourage you to contact them to determine your coverage. The codes below are the codes we use when billing for telephone visits and the associated charges. This may help you work with your insurance company to determine your benefits.   Billing CPT codes for Telephone visits  44592 ($30), 97862 ($35), 19873 ($40)            Aug 03, 2018  2:00 PM CDT   (Arrive by 1:45 PM)   Return Plastic Surgery with Kevin Sheldon MD   [facility] ([facility])    [address] [place] [address]  4th Floor  [facility] [id number] [phone]           Do not wear perfume.            Aug 15, 2018 10:00 AM CDT   (Arrive by 9:45 AM)   RETURN ARRHYTHMIA with Juan Eaton MD   [facility] ([facility])    [address] [place] [address]  [address]  [facility] [id number] [phone]            Sep 26, 2018 10:40 AM CDT   CT CHEST ABDOMEN PELVIS W/O & W CONTRAST with UCCT1   [facility] [place] [facility] ([facility])    [address] [place] [address]  1st Floor  [facility] [id number] [phone]           Please bring any scans or X-rays taken at other hospitals, if similar tests were done. Also bring a list of your medicines, including vitamins, minerals and over-the-counter drugs. It is safest to leave  personal items at home.  Be sure to tell your doctor:   If you have any allergies.   If there s any chance you are pregnant.   If you are breastfeeding.  How to prepare:   Do not eat or drink for 2 hours before your exam. If you need to take medicine, you may take it with small sips of water. (We may ask you to take liquid medicine as well.)   Please wear loose clothing, such as a sweat suit or jogging clothes. Avoid snaps, zippers and other metal. We may ask you to undress and put on a hospital gown.  Please arrive 30 minutes early for your CT. Once in the department you might be asked to drink water 15-20 minutes prior to your exam.  If indicated you may be asked to drink an oral contrast in advance of your CT.  If this is the case, the imaging team will let you know or be in contact with you prior to your appointment  Patients over 70 or patients with diabetes or kidney problems:   If you haven t had a blood test (creatinine test) within the last 30 days, the Cardiologist/Radiologist may require you to get this test prior to your exam.  If you have diabetes:   Continue to take your metformin medication on the day of your exam  If you have any questions, please call the Imaging Department where you will have your exam.            Sep 26, 2018  1:15 PM CDT   [facility] Draw with  [facility]RAW   [facility] Draw ([facility])    [address] [place] [address]  [address]  [facility] [id number] [phone]            Sep 26, 2018  1:45 PM CDT   RETURN ONC with Lenore Rodriguez MD   [facility] Blood and Marrow Transplant ([facility])    [address] [place] [address]  [address]  [facility] [id number] [phone]            Nov 01, 2018  3:00 PM CDT   (Arrive by 2:45 PM)   Return Visit with Nayeli Pritchett MD   [facility] Rheumatology ([facility])    [address] [place] [address]  [address]  [facility] [id number] [phone]     "          Who to contact     Please call your clinic at [phone] to:    Ask questions about your health    Make or cancel appointments    Discuss your medicines    Learn about your test results    Speak to your doctor            Additional Information About Your Visit        [employer] Information     [employer] gives you secure access to your electronic health record. If you see a primary care provider, you can also send messages to your care team and make appointments. If you have questions, please call your primary care clinic.  If you do not have a primary care provider, please call [phone] and they will assist you.      [employer] is an electronic gateway that provides easy, online access to your medical records. With [employer], you can request a clinic appointment, read your test results, renew a prescription or communicate with your care team.     To access your existing account, please contact your [facility] or call [phone] for assistance.        Care EveryWhere ID     This is your Care EveryWhere ID. This could be used by other organizations to access your [facility] medical records  [id number]        Your Vitals Were     Pulse Height BMI (Body Mass Index)             68 1.473 m (4' 10\") 23.62 kg/m2          Blood Pressure from Last 3 Encounters:   06/06/18 120/88   05/31/18 127/72   05/24/18 123/68    Weight from Last 3 Encounters:   06/06/18 51.3 kg (113 lb)   05/31/18 51.4 kg (113 lb 6.4 oz)   05/24/18 51.6 kg (113 lb 12.8 oz)              Today, you had the following     No orders found for display       Primary Care Provider Office Phone # Fax #    Gala Stringer PA-C [phone] [address] [facility] [facility] [id number]        Equal Access to Services     [facility] : Hadii laurita Flores, khari ibarra, qaybta nickaldurga de la rosa. So [facility] [phone].    ATENCIÓN: Si habla español, tiene a sarmiento " disposición servicios gratuitos de asistencia lingüística. Taylor hooker [phone].    We comply with applicable federal civil rights laws and [place] laws. We do not discriminate on the basis of race, color, national origin, age, disability, sex, sexual orientation, or gender identity.            Thank you!     Thank you for choosing [facility]Y AND [facility] FOR PROSTATE AND UROLOGIC CANCERS  for your care. Our goal is always to provide you with excellent care. Hearing back from our patients is one way we can continue to improve our services. Please take a few minutes to complete the written survey that you may receive in the mail after your visit with us. Thank you!             Your Updated Medication List - Protect others around you: Learn how to safely use, store and throw away your medicines at www.disposemymeds.org.          This list is accurate as of 6/6/18  9:30 AM.  Always use your most recent med list.                   Brand Name Dispense Instructions for use Diagnosis    acetaminophen 325 MG tablet    TYLENOL     Take 2 tablets (650 mg) by mouth every 4 hours as needed for mild pain, fever or headaches    Diffuse large B-cell lymphoma of extranodal site (H)       apixaban ANTICOAGULANT 5 MG tablet    ELIQUIS    180 tablet    Take 1 tablet (5 mg) by mouth 2 times daily    Paroxysmal atrial fibrillation (H)       ascorbic acid 500 MG Tabs     30 tablet    Take 1 tablet (500 mg) by mouth daily    Diffuse large B-cell lymphoma, unspecified body region (H)       atenolol 25 MG tablet    TENORMIN    60 tablet    Take 1 tablet (25 mg) by mouth 2 times daily    Atrial tachycardia (H)       calcium polycarbophil 625 MG tablet    FIBERCON     Take 1 tablet by mouth 2 times daily        calcium-vitamin D 600-400 MG-UNIT per tablet    CALTRATE    60 tablet    Take 2 tablets by mouth every morning    Diffuse large B-cell lymphoma, unspecified body region (H)       colchicine 0.6 MG tablet    COLCYRS    60 tablet     Take 1 tablet (0.6 mg) by mouth daily    Pericarditis       digoxin 125 MCG tablet    LANOXIN    90 tablet    Take 250mcg daily for 2 days, then decrease to 125mcg daily    Atrial flutter, unspecified type (H)       furosemide 20 MG tablet    LASIX    90 tablet    Take 1 tablet (20 mg) by mouth daily    Chronic systolic heart failure (H)       gabapentin 100 MG capsule    NEURONTIN    180 capsule    TAKE TWO CAPSULES BY MOUTH THREE TIMES A DAY    Critical illness myopathy       HYDROCORTISONE PO      Take 100 mg by mouth IV        hydroxychloroquine 200 MG tablet    PLAQUENIL    60 tablet    Take 1 tablet (200 mg) by mouth 2 times daily    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 (H)       multivitamin, therapeutic with minerals Tabs tablet     30 each    Take 1 tablet by mouth daily    Diffuse large B-cell lymphoma, unspecified body region (H)       order for DME     1 Device    Equipment being ordered: BP cuff    Hypertension, unspecified type       order for DME     1 Units    Equipment being ordered: Thigh high compression stockings Class 2: off the shelf or custom, farrow foot wrap and juxta fit premium lower leg wrap, lymphedema bandaging supplies 3 sets    Lymphedema of both lower extremities       predniSONE 5 MG tablet    DELTASONE    90 tablet    Take 1 tablet (5 mg) by mouth daily    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 (H)       spironolactone 25 MG tablet    ALDACTONE    30 tablet    Take 1 tablet (25 mg) by mouth daily    Acute on chronic diastolic heart failure (H)       tolterodine 4 MG 24 hr capsule    DETROL LA    30 capsule    Take 1 capsule (4 mg) by mouth daily    Urinary urgency, Urge incontinence

## 2018-06-06 NOTE — LETTER
6/6/2018     RE: Amelia Michel  [address]  [place] [address]     Dear Colleague,    Thank you for referring your patient, Amelia Michel, to the [facility] UROLOGY AND INST FOR PROSTATE AND UROLOGIC CANCERS at [facility]. Please see a copy of my visit note below.    Reason for Visit:  Cystoscopy    Clinical Data: Ms. Amelia Michel is a 57 year old female with a hx of urinary urgency and urge incontinence.  She is currently on Detrol and this helps a little but most of her incontinence is at night.  She underwent UDS and presents for cystoscopy.  She has a PMH of diffuse large B cell lymphoma currently receiving chemotherapy, h/o cardiac arrest, DVT on chronic anticoagulation (currently with Lovenox), RA (on prednisone), and HTN.    UDS from 3/1/18:    -Multiple episodes of DO starting at bladder volumes of 50 mL which the patient is unable to suppress. She leaks almost the entire contents of her bladder with Pdet pressures ranging into the mid 30's cm H2O.  -Fair/poor bladder compliance  -Small bladder capacity (MCFP <130 mL)  -Increased sensations  -No stress incontinence, but DO with incontinence as described above.   -Detrusor activity during voiding: fair/good detrusor contraction to 30.6 cm H2O with final PVR ~50 mL  -Detrusor sphincter dyssenergia: some increased EMG activity during voiding; unclear whether this is secondary to true DSD (no known neurological etiology) or voiding dysfunction.  -Fluoroscopy reveals a moderately trabeculated bladder wall with some cellules and diverticuli. No vesicoureteral reflux was observed.  The bladder neck was closed during filling and open during voiding and times of incontinence.    Cystoscopy procedure:  Pt. Was consented and placed in the lithotomy position.  She was cleaned and preparred in the usual fashion.  Lidocain gel was inserted into the urethra and given time to take effect.  A 16 fr flexible cystoscope  was then inserted through the urethra and into the bladder.  The urethra was wnl.  The bladder was with 1-2+ trabeculation.  There is some quivering of the bladder wall as it is filled.  No tumors, diverticulae, or stones.  Bilateral u/o's were effluxing clear urine.  The cystoscope was then withdrawn.  The pt. Tolerated the procedure well.    A/P:  57 year old female with overactive bladder, urinary urgency, and urge incontinence.  We discussed different options including other medication, botox, interstim, and PTNS.  She is most interested in the botox for various medical reasons but would like to think about it a bit.  -arrange for phone viist in 1 month    Thank you for allowing me to participate in the care of  Ms. Amelia Michel and I will keep you updated on her progress.    Vadim Sparks MD

## 2018-06-18 ENCOUNTER — TELEPHONE (OUTPATIENT)
Dept: WOUND CARE | Facility: CLINIC | Age: 58
End: 2018-06-18

## 2018-06-18 NOTE — TELEPHONE ENCOUNTER
Called melonie again and given the number to our med records dept. This is the 3rd time they called for records.    ----- Message from Shirley Eller RN sent at 6/18/2018  9:39 AM CDT -----  Jaqueline wilhelm again requesting med records   Amelia gu [phone]   [id number]

## 2018-06-29 DIAGNOSIS — I47.19 ATRIAL TACHYCARDIA (H): ICD-10-CM

## 2018-07-02 RX ORDER — ATENOLOL 25 MG/1
25 TABLET ORAL 2 TIMES DAILY
Qty: 180 TABLET | Refills: 3 | Status: SHIPPED | OUTPATIENT
Start: 2018-07-02 | End: 2019-03-26

## 2018-07-06 ENCOUNTER — DOCUMENTATION ONLY (OUTPATIENT)
Dept: ONCOLOGY | Facility: CLINIC | Age: 58
End: 2018-07-06

## 2018-07-06 NOTE — PROGRESS NOTES
Form Request Documentation    Date Received in Clinic:  6/28/18  Name/Type of Form: [facility]  Questions that need to be addressed:   Current Employment Status: not currently working,    Amount of Leave Requested: Continuous Leave    Other: None  Date Completed: 7/6/2018  Copy Mailed to patient: Yes on 7/6/2018  Disposition of Form: Fax to M[facility]el at [phone]

## 2018-07-10 ENCOUNTER — PRE VISIT (OUTPATIENT)
Dept: UROLOGY | Facility: CLINIC | Age: 58
End: 2018-07-10

## 2018-07-10 NOTE — TELEPHONE ENCOUNTER
Patient with history of overactive bladder, urinary urgency, and urge incontinence to have telephone visit with Dr. Sparks regarding plan. Patient chart reviewed, no need for call, all records available and ready for appointment.

## 2018-07-25 ENCOUNTER — VIRTUAL VISIT (OUTPATIENT)
Dept: UROLOGY | Facility: CLINIC | Age: 58
End: 2018-07-25
Payer: COMMERCIAL

## 2018-07-25 DIAGNOSIS — N32.81 OVERACTIVE BLADDER: Primary | ICD-10-CM

## 2018-07-25 NOTE — PROGRESS NOTES
Reason for Call:  F/u on urinary symptoms.    Clinical Data: Ms. Amelia Michel is a 57 year old female with a hx of urinary urgency and urge incontinence.  She is currently on Detrol and this helps a little but most of her incontinence is at night.  She underwent UDS and presents for cystoscopy.  She has a PMH of diffuse large B cell lymphoma currently receiving chemotherapy, h/o cardiac arrest, DVT on chronic anticoagulation (currently with Lovenox), RA (on prednisone), and HTN.    UDS from 3/1/18:    -Multiple episodes of DO starting at bladder volumes of 50 mL which the patient is unable to suppress. She leaks almost the entire contents of her bladder with Pdet pressures ranging into the mid 30's cm H2O.  -Fair/poor bladder compliance  -Small bladder capacity (FDC <130 mL)  -Increased sensations  -No stress incontinence, but DO with incontinence as described above.   -Detrusor activity during voiding: fair/good detrusor contraction to 30.6 cm H2O with final PVR ~50 mL  -Detrusor sphincter dyssenergia: some increased EMG activity during voiding; unclear whether this is secondary to true DSD (no known neurological etiology) or voiding dysfunction.  -Fluoroscopy reveals a moderately trabeculated bladder wall with some cellules and diverticuli. No vesicoureteral reflux was observed.  The bladder neck was closed during filling and open during voiding and times of incontinence.    Cystoscopy 6/6/18 showed 1-2+ trabeculation and  some quivering of the bladder wall as it is filled.  Othersie normal.    A/P:  57 year old female with overactive bladder, urinary urgency, and urge incontinence.  We again discussed different options including other medication, botox, interstim, and PTNS.  She had mentioned accupuncture but then we talked more about PTNS and she would like to do that.    -start process for PTNS   -f/u with me after 12 weeks    Thank you for allowing me to participate in the care of  Ms. Amelia Michel and  I will keep you updated on her progress.    Vadim Sparks MD    12 min spent with patient in discussion.

## 2018-07-25 NOTE — MR AVS SNAPSHOT
After Visit Summary   7/25/2018    Amelia Michel    MRN: [id number]           Patient Information     Date Of Birth          1960        Visit Information        Provider Department      7/25/2018 8:45 AM Vadim Sparks MD [facility] Prostate and Urologic Cancers        Today's Diagnoses     Overactive bladder    -  1       Follow-ups after your visit        Follow-up notes from your care team     Return for after ptns.      Your next 10 appointments already scheduled     Aug 03, 2018  2:00 PM CDT   (Arrive by 1:45 PM)   Return Plastic Surgery with Kevin Sheldon MD   [facility] Plastic and Reconstructive Surgery ([facility])    [address]  4th Floor  [facility] [address] [phone]           Do not wear perfume.            Aug 15, 2018 10:00 AM CDT   (Arrive by 9:45 AM)   RETURN ARRHYTHMIA with Juan Eaton MD   [facility] Heart Care ([facility])    [address]  [address]  [facility] [id number] [phone]            Sep 26, 2018 10:40 AM CDT   CT CHEST ABDOMEN PELVIS W/O & W CONTRAST with UCCT1   [facility] [place] [facility] ([facility])    [address]  1st Floor  [facility] [address] [phone]           Please bring any scans or X-rays taken at other hospitals, if similar tests were done. Also bring a list of your medicines, including vitamins, minerals and over-the-counter drugs. It is safest to leave personal items at home.  Be sure to tell your doctor:   If you have any allergies.   If there s any chance you are pregnant.   If you are breastfeeding.  How to prepare:   Do not eat or drink for 2 hours before your exam. If you need to take medicine, you may take it with small sips of water. (We may ask you to take liquid medicine as well.)   Please wear loose clothing, such as a sweat suit or jogging clothes. Avoid snaps, zippers and other metal.  We may ask you to undress and put on a hospital gown.  Please arrive 30 minutes early for your CT. Once in the department you might be asked to drink water 15-20 minutes prior to your exam.  If indicated you may be asked to drink an oral contrast in advance of your CT.  If this is the case, the imaging team will let you know or be in contact with you prior to your appointment  Patients over 70 or patients with diabetes or kidney problems:   If you haven t had a blood test (creatinine test) within the last 30 days, the Cardiologist/Radiologist may require you to get this test prior to your exam.  If you have diabetes:   Continue to take your metformin medication on the day of your exam  If you have any questions, please call the Imaging Department where you will have your exam.            Sep 26, 2018  1:15 PM CDT   M[facility]ic Lab Draw with  MASONIC LAB DRAW   [facility] Masonic Lab Draw ([facility])    [address]  [address]  [facility] [id number] [phone]            Sep 26, 2018  1:45 PM CDT   RETURN ONC with Lenore Rodriguez MD   [facility] Blood and Marrow Transplant ([facility])    [address]  [address]  [facility] [id number] [phone]            Nov 01, 2018  3:00 PM CDT   (Arrive by 2:45 PM)   Return Visit with Nayeli Pritchett MD   [facility] Rheumatology ([facility])    [address] [place] [address]  [address]  [facility] [id number] [phone]              Who to contact     Please call your clinic at [phone] to:    Ask questions about your health    Make or cancel appointments    Discuss your medicines    Learn about your test results    Speak to your doctor            Additional Information About Your Visit        [employer] Information     [employer] gives you secure access to your electronic health record. If you see a primary care provider, you can also send messages to your care team  and make appointments. If you have questions, please call your primary care clinic.  If you do not have a primary care provider, please call [phone] and they will assist you.      [employer] is an electronic gateway that provides easy, online access to your medical records. With [employer], you can request a clinic appointment, read your test results, renew a prescription or communicate with your care team.     To access your existing account, please contact your [facility] or call [phone] for assistance.        Care EveryWhere ID     This is your Care EveryWhere ID. This could be used by other organizations to access your [place] medical records  [id number]         Blood Pressure from Last 3 Encounters:   06/06/18 120/88   05/31/18 127/72   05/24/18 123/68    Weight from Last 3 Encounters:   06/06/18 51.3 kg (113 lb)   05/31/18 51.4 kg (113 lb 6.4 oz)   05/24/18 51.6 kg (113 lb 12.8 oz)              Today, you had the following     No orders found for display       Primary Care Provider Office Phone # Fax #    Gala Stringer PA-C [phone] [address] [facility] [address]        Equal Access to Services     [employer] : Hadii aad ku hadasho Soomaali, waaxda luqadaha, qaybta kaalmada adeegyada, waxay dee hayfloydn jesica inman. So [facility] [phone].    ATENCIÓN: Si habla español, tiene a sarmiento disposición servicios gratuitos de asistencia lingüística. Llame al [phone].    We comply with applicable federal civil rights laws and [place] laws. We do not discriminate on the basis of race, color, national origin, age, disability, sex, sexual orientation, or gender identity.            Thank you!     Thank you for choosing [facility] UROLOGY AND [facility] FOR PROSTATE AND UROLOGIC CANCERS  for your care. Our goal is always to provide you with excellent care. Hearing back from our patients is one way we can continue to improve our services. Please  take a few minutes to complete the written survey that you may receive in the mail after your visit with us. Thank you!             Your Updated Medication List - Protect others around you: Learn how to safely use, store and throw away your medicines at www.disposemymeds.org.          This list is accurate as of 7/25/18  8:55 AM.  Always use your most recent med list.                   Brand Name Dispense Instructions for use Diagnosis    acetaminophen 325 MG tablet    TYLENOL     Take 2 tablets (650 mg) by mouth every 4 hours as needed for mild pain, fever or headaches    Diffuse large B-cell lymphoma of extranodal site (H)       apixaban ANTICOAGULANT 5 MG tablet    ELIQUIS    180 tablet    Take 1 tablet (5 mg) by mouth 2 times daily    Paroxysmal atrial fibrillation (H)       ascorbic acid 500 MG Tabs     30 tablet    Take 1 tablet (500 mg) by mouth daily    Diffuse large B-cell lymphoma, unspecified body region (H)       atenolol 25 MG tablet    TENORMIN    180 tablet    Take 1 tablet (25 mg) by mouth 2 times daily    Atrial tachycardia (H)       calcium polycarbophil 625 MG tablet    FIBERCON     Take 1 tablet by mouth 2 times daily        calcium-vitamin D 600-400 MG-UNIT per tablet    CALTRATE    60 tablet    Take 2 tablets by mouth every morning    Diffuse large B-cell lymphoma, unspecified body region (H)       colchicine 0.6 MG tablet    COLCYRS    60 tablet    Take 1 tablet (0.6 mg) by mouth daily    Pericarditis       digoxin 125 MCG tablet    LANOXIN    90 tablet    Take 250mcg daily for 2 days, then decrease to 125mcg daily    Atrial flutter, unspecified type (H)       furosemide 20 MG tablet    LASIX    90 tablet    Take 1 tablet (20 mg) by mouth daily    Chronic systolic heart failure (H)       gabapentin 100 MG capsule    NEURONTIN    180 capsule    TAKE TWO CAPSULES BY MOUTH THREE TIMES A DAY    Critical illness myopathy       HYDROCORTISONE PO      Take 100 mg by mouth IV        hydroxychloroquine  200 MG tablet    PLAQUENIL    60 tablet    Take 1 tablet (200 mg) by mouth 2 times daily    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 (H)       multivitamin, therapeutic with minerals Tabs tablet     30 each    Take 1 tablet by mouth daily    Diffuse large B-cell lymphoma, unspecified body region (H)       order for DME     1 Device    Equipment being ordered: BP cuff    Hypertension, unspecified type       order for DME     1 Units    Equipment being ordered: Thigh high compression stockings Class 2: off the shelf or custom, farrow foot wrap and juxta fit premium lower leg wrap, lymphedema bandaging supplies 3 sets    Lymphedema of both lower extremities       predniSONE 5 MG tablet    DELTASONE    90 tablet    Take 1 tablet (5 mg) by mouth daily    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 (H)       spironolactone 25 MG tablet    ALDACTONE    30 tablet    Take 1 tablet (25 mg) by mouth daily    Acute on chronic diastolic heart failure (H)       tolterodine 4 MG 24 hr capsule    DETROL LA    30 capsule    Take 1 capsule (4 mg) by mouth daily    Urinary urgency, Urge incontinence

## 2018-07-26 ENCOUNTER — TELEPHONE (OUTPATIENT)
Dept: UROLOGY | Facility: CLINIC | Age: 58
End: 2018-07-26

## 2018-07-31 ENCOUNTER — MYC MEDICAL ADVICE (OUTPATIENT)
Dept: FAMILY MEDICINE | Facility: CLINIC | Age: 58
End: 2018-07-31

## 2018-07-31 DIAGNOSIS — G72.81 CRITICAL ILLNESS MYOPATHY: ICD-10-CM

## 2018-08-01 RX ORDER — GABAPENTIN 100 MG/1
CAPSULE ORAL
Qty: 180 CAPSULE | Refills: 2 | Status: SHIPPED | OUTPATIENT
Start: 2018-08-01 | End: 2018-11-01

## 2018-08-03 ENCOUNTER — OFFICE VISIT (OUTPATIENT)
Dept: PLASTIC SURGERY | Facility: CLINIC | Age: 58
End: 2018-08-03
Payer: COMMERCIAL

## 2018-08-03 VITALS
HEART RATE: 47 BPM | BODY MASS INDEX: 24.71 KG/M2 | DIASTOLIC BLOOD PRESSURE: 64 MMHG | HEIGHT: 58 IN | SYSTOLIC BLOOD PRESSURE: 124 MMHG | TEMPERATURE: 97.7 F | WEIGHT: 117.7 LBS | OXYGEN SATURATION: 95 %

## 2018-08-03 DIAGNOSIS — S41.102S WOUND OF LEFT UPPER EXTREMITY, SEQUELA: Primary | ICD-10-CM

## 2018-08-03 ASSESSMENT — PAIN SCALES - GENERAL: PAINLEVEL: NO PAIN (0)

## 2018-08-03 NOTE — MR AVS SNAPSHOT
After Visit Summary   8/3/2018    Amelia Michel    MRN: [id number]           Patient Information     Date Of Birth          1960        Visit Information        Provider Department      8/3/2018 2:00 PM Kevin Sheldon MD [facility] Plastic and Reconstructive Surgery        Today's Diagnoses     Wound of left upper extremity, sequela    -  1       Follow-ups after your visit        Follow-up notes from your care team     Return if symptoms worsen or fail to improve.      Your next 10 appointments already scheduled     Aug 15, 2018 10:00 AM CDT   (Arrive by 9:45 AM)   RETURN ARRHYTHMIA with Juan Eaton MD   [facility] Heart [facility] ([facility])    [address]  [address]  [facility] [address] [phone]            Sep 26, 2018 10:40 AM CDT   CT CHEST ABDOMEN PELVIS W/O & W CONTRAST with UCCT1   [facility] CT ([facility])    [address]  1st Floor  [facility] [id number] [phone]           Please bring any scans or X-rays taken at other hospitals, if similar tests were done. Also bring a list of your medicines, including vitamins, minerals and over-the-counter drugs. It is safest to leave personal items at home.  Be sure to tell your doctor:   If you have any allergies.   If there s any chance you are pregnant.   If you are breastfeeding.  How to prepare:   Do not eat or drink for 2 hours before your exam. If you need to take medicine, you may take it with small sips of water. (We may ask you to take liquid medicine as well.)   Please wear loose clothing, such as a sweat suit or jogging clothes. Avoid snaps, zippers and other metal. We may ask you to undress and put on a hospital gown.  Please arrive 30 minutes early for your CT. Once in the department you might be asked to drink water 15-20 minutes prior to your exam.  If indicated you may be asked to drink an oral contrast in advance of your CT.  If  this is the case, the imaging team will let you know or be in contact with you prior to your appointment  Patients over 70 or patients with diabetes or kidney problems:   If you haven t had a blood test (creatinine test) within the last 30 days, the Cardiologist/Radiologist may require you to get this test prior to your exam.  If you have diabetes:   Continue to take your metformin medication on the day of your exam  If you have any questions, please call the Imaging Department where you will have your exam.            Sep 26, 2018  1:15 PM CDT   Masonic Lab Draw with  MASONIC LAB DRAW   [facility] Masonic Lab Draw ([facility])    [address]  [address]  [facility] [id number] [phone]            Sep 26, 2018  1:45 PM CDT   RETURN ONC with Lenore Rodriguez MD   [facility] Blood and Marrow Transplant ([facility])    [address]  [address]  [facility] [address] [phone]            Nov 01, 2018  3:00 PM CDT   (Arrive by 2:45 PM)   Return Visit with Nayeli Pritchett MD   [facility] Rheumatology ([facility])    [address] [place] [address]  [address]  [facility] [address] [phone]              Who to contact     Please call your clinic at [phone] to:    Ask questions about your health    Make or cancel appointments    Discuss your medicines    Learn about your test results    Speak to your doctor            Additional Information About Your Visit        [employer] Information     [employer] gives you secure access to your electronic health record. If you see a primary care provider, you can also send messages to your care team and make appointments. If you have questions, please call your primary care clinic.  If you do not have a primary care provider, please call [phone] and they will assist you.      [employer] is an electronic gateway that provides easy, online access to your medical records.  "With MyChart, you can request a clinic appointment, read your test results, renew a prescription or communicate with your care team.     To access your existing account, please contact your [facility] or call [phone] for assistance.        Care EveryWhere ID     This is your Care EveryWhere ID. This could be used by other organizations to access your [place] medical records  [id number]        Your Vitals Were     Pulse Temperature Height Pulse Oximetry BMI (Body Mass Index)       47 97.7  F (36.5  C) (Oral) 4' 10\" 95% 24.6 kg/m2        Blood Pressure from Last 3 Encounters:   08/03/18 124/64   06/06/18 120/88   05/31/18 127/72    Weight from Last 3 Encounters:   08/03/18 117 lb 11.2 oz   06/06/18 113 lb   05/31/18 113 lb 6.4 oz              Today, you had the following     No orders found for display       Primary Care Provider Office Phone # Fax #    Gala Stringer PA-C [phone] [address] [facility] [address]        Equal Access to Services     [facility]: Hadii laurita ku hadasho Sogilbert, waaxda luqadaha, qaybta kaalmada catalino, durga hermosillo . So [facility] [phone].    ATENCIÓN: Si habla español, tiene a sarmiento disposición servicios gratuitos de asistencia lingüística. [facility] [phone].    We comply with applicable federal civil rights laws and [place] laws. We do not discriminate on the basis of race, color, national origin, age, disability, sex, sexual orientation, or gender identity.            Thank you!     Thank you for choosing [facility] PLASTIC AND RECONSTRUCTIVE SURGERY  for your care. Our goal is always to provide you with excellent care. Hearing back from our patients is one way we can continue to improve our services. Please take a few minutes to complete the written survey that you may receive in the mail after your visit with us. Thank you!             Your Updated Medication List - Protect others " around you: Learn how to safely use, store and throw away your medicines at www.disposemymeds.org.          This list is accurate as of 8/3/18  2:16 PM.  Always use your most recent med list.                   Brand Name Dispense Instructions for use Diagnosis    acetaminophen 325 MG tablet    TYLENOL     Take 2 tablets (650 mg) by mouth every 4 hours as needed for mild pain, fever or headaches    Diffuse large B-cell lymphoma of extranodal site (H)       apixaban ANTICOAGULANT 5 MG tablet    ELIQUIS    180 tablet    Take 1 tablet (5 mg) by mouth 2 times daily    Paroxysmal atrial fibrillation (H)       ascorbic acid 500 MG Tabs     30 tablet    Take 1 tablet (500 mg) by mouth daily    Diffuse large B-cell lymphoma, unspecified body region (H)       atenolol 25 MG tablet    TENORMIN    180 tablet    Take 1 tablet (25 mg) by mouth 2 times daily    Atrial tachycardia (H)       calcium polycarbophil 625 MG tablet    FIBERCON     Take 1 tablet by mouth 2 times daily        calcium-vitamin D 600-400 MG-UNIT per tablet    CALTRATE    60 tablet    Take 2 tablets by mouth every morning    Diffuse large B-cell lymphoma, unspecified body region (H)       colchicine 0.6 MG tablet    COLCYRS    60 tablet    Take 1 tablet (0.6 mg) by mouth daily    Pericarditis       digoxin 125 MCG tablet    LANOXIN    90 tablet    Take 250mcg daily for 2 days, then decrease to 125mcg daily    Atrial flutter, unspecified type (H)       furosemide 20 MG tablet    LASIX    90 tablet    Take 1 tablet (20 mg) by mouth daily    Chronic systolic heart failure (H)       gabapentin 100 MG capsule    NEURONTIN    180 capsule    TAKE TWO CAPSULES BY MOUTH THREE TIMES A DAY    Critical illness myopathy       HYDROCORTISONE PO      Take 100 mg by mouth IV        hydroxychloroquine 200 MG tablet    PLAQUENIL    60 tablet    Take 1 tablet (200 mg) by mouth 2 times daily    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 (H)        multivitamin, therapeutic with minerals Tabs tablet     30 each    Take 1 tablet by mouth daily    Diffuse large B-cell lymphoma, unspecified body region (H)       order for DME     1 Device    Equipment being ordered: BP cuff    Hypertension, unspecified type       order for DME     1 Units    Equipment being ordered: Thigh high compression stockings Class 2: off the shelf or custom, farrow foot wrap and juxta fit premium lower leg wrap, lymphedema bandaging supplies 3 sets    Lymphedema of both lower extremities       predniSONE 5 MG tablet    DELTASONE    90 tablet    Take 1 tablet (5 mg) by mouth daily    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 (H)       spironolactone 25 MG tablet    ALDACTONE    30 tablet    Take 1 tablet (25 mg) by mouth daily    Acute on chronic diastolic heart failure (H)       tolterodine 4 MG 24 hr capsule    DETROL LA    30 capsule    Take 1 capsule (4 mg) by mouth daily    Urinary urgency, Urge incontinence

## 2018-08-03 NOTE — PROGRESS NOTES
Patient returns for a postoperative follow-up after left upper extremity chronic wound excision.    INTERVAL HISTORY: Wound is well-healed. Patient is slightly concerned of a pulsatile look at the proximal incision site.    PHYSICAL EXAMINATION:  Gen.: No acute distress.  Left upper extremity incision site is well healed. Incision scar is slightly raised and indurated. There is a small pulsatile arterial branch underneath the skin along the proximal incision area. Palpation of this does not produce any symptoms. The medial forearm is dull to light touch sensation.    PATHOLOGY:  SPECIMEN(S):   Left arm wound tissue     FINAL DIAGNOSIS:   SOFT TISSUE, LEFT ARM WOUND, EXCISION:   - Skin and subcutaneous tissue with acute inflammation, fat necrosis,   granulomas with foreign body giant cell   reaction, granulation tissue and scarring.   - Small fragment of possible sinus lined by squamous epithelium.   - Negative for malignancy.     ASSESSMENT: 2.5 months status post excision of left upper extremity chronic wound and fat necrosis.    PLAN: I recommended monitoring the arterial branch for now. Patient is let us know if there is any bleeding or hematoma from it. As for the scarring, I recommended silicone gel sheets. Patient will return to see me as needed.    Total time spent with patient was 15 min of which greater than 50% was in counseling.

## 2018-08-03 NOTE — LETTER
8/3/2018       RE: Amelia Michel  [address]  [place] [address]     Dear Colleague,    Thank you for referring your patient, Amelia Michel, to the [facility] PLASTIC AND RECONSTRUCTIVE SURGERY at [facility]. Please see a copy of my visit note below.    Patient returns for a postoperative follow-up after left upper extremity chronic wound excision.    INTERVAL HISTORY: Wound is well-healed. Patient is slightly concerned of a pulsatile look at the proximal incision site.    PHYSICAL EXAMINATION:  Gen.: No acute distress.  Left upper extremity incision site is well healed. Incision scar is slightly raised and indurated. There is a small pulsatile arterial branch underneath the skin along the proximal incision area. Palpation of this does not produce any symptoms. The medial forearm is dull to light touch sensation.    PATHOLOGY:  SPECIMEN(S):   Left arm wound tissue     FINAL DIAGNOSIS:   SOFT TISSUE, LEFT ARM WOUND, EXCISION:   - Skin and subcutaneous tissue with acute inflammation, fat necrosis,   granulomas with foreign body giant cell   reaction, granulation tissue and scarring.   - Small fragment of possible sinus lined by squamous epithelium.   - Negative for malignancy.     ASSESSMENT: 2.5 months status post excision of left upper extremity chronic wound and fat necrosis.    PLAN: I recommended monitoring the arterial branch for now. Patient is let us know if there is any bleeding or hematoma from it. As for the scarring, I recommended silicone gel sheets. Patient will return to see me as needed.    Total time spent with patient was 15 min of which greater than 50% was in counseling.    Again, thank you for allowing me to participate in the care of your patient.      Sincerely,    Kevin Sheldon MD

## 2018-08-03 NOTE — NURSING NOTE
"Chief Complaint   Patient presents with     Surgical Followup     Post op visit.        Vitals:    08/03/18 1355   BP: 124/64   BP Location: Left arm   Patient Position: Chair   Cuff Size: Adult Regular   Pulse: (!) 47   Temp: 97.7  F (36.5  C)   TempSrc: Oral   SpO2: 95%   Weight: 117 lb 11.2 oz   Height: 4' 10\"       Body mass index is 24.6 kg/(m^2).    Laurel OVIEDO LPN                     "

## 2018-08-14 ASSESSMENT — ENCOUNTER SYMPTOMS
LIGHT-HEADEDNESS: 0
MUSCLE WEAKNESS: 1
PARALYSIS: 0
SINUS CONGESTION: 1
HYPERTENSION: 0
NECK MASS: 0
WEAKNESS: 1
SYNCOPE: 0
LOSS OF CONSCIOUSNESS: 0
MYALGIAS: 1
HEMATURIA: 0
DIZZINESS: 1
STIFFNESS: 1
TASTE DISTURBANCE: 0
TROUBLE SWALLOWING: 0
BRUISES/BLEEDS EASILY: 0
DISTURBANCES IN COORDINATION: 1
SWOLLEN GLANDS: 0
TREMORS: 0
DIFFICULTY URINATING: 0
NAIL CHANGES: 0
NECK PAIN: 0
FLANK PAIN: 0
JOINT SWELLING: 1
HOARSE VOICE: 0
SLEEP DISTURBANCES DUE TO BREATHING: 0
SORE THROAT: 0
HYPOTENSION: 0
SPEECH CHANGE: 0
SMELL DISTURBANCE: 0
PALPITATIONS: 1
MEMORY LOSS: 1
BACK PAIN: 0
ARTHRALGIAS: 1
EXERCISE INTOLERANCE: 1
SINUS PAIN: 1
SEIZURES: 0
HEADACHES: 0
DYSURIA: 0
LEG PAIN: 0
NUMBNESS: 1
MUSCLE CRAMPS: 1
TINGLING: 1
SKIN CHANGES: 0

## 2018-08-15 ENCOUNTER — OFFICE VISIT (OUTPATIENT)
Dept: CARDIOLOGY | Facility: CLINIC | Age: 58
End: 2018-08-15
Attending: INTERNAL MEDICINE
Payer: COMMERCIAL

## 2018-08-15 VITALS
SYSTOLIC BLOOD PRESSURE: 123 MMHG | WEIGHT: 118.5 LBS | BODY MASS INDEX: 24.87 KG/M2 | OXYGEN SATURATION: 95 % | HEART RATE: 61 BPM | DIASTOLIC BLOOD PRESSURE: 80 MMHG | HEIGHT: 58 IN

## 2018-08-15 DIAGNOSIS — Q21.0 VSD (VENTRICULAR SEPTAL DEFECT): ICD-10-CM

## 2018-08-15 DIAGNOSIS — Z86.79 HISTORY OF PERICARDITIS: Primary | ICD-10-CM

## 2018-08-15 DIAGNOSIS — I48.92 ATRIAL FLUTTER, UNSPECIFIED TYPE (H): ICD-10-CM

## 2018-08-15 DIAGNOSIS — I48.0 PAROXYSMAL ATRIAL FIBRILLATION (H): ICD-10-CM

## 2018-08-15 PROCEDURE — 93010 ELECTROCARDIOGRAM REPORT: CPT | Mod: ZP | Performed by: INTERNAL MEDICINE

## 2018-08-15 PROCEDURE — G0463 HOSPITAL OUTPT CLINIC VISIT: HCPCS

## 2018-08-15 PROCEDURE — 99214 OFFICE O/P EST MOD 30 MIN: CPT | Mod: ZP | Performed by: INTERNAL MEDICINE

## 2018-08-15 PROCEDURE — 93005 ELECTROCARDIOGRAM TRACING: CPT | Mod: ZF

## 2018-08-15 ASSESSMENT — PAIN SCALES - GENERAL: PAINLEVEL: NO PAIN (0)

## 2018-08-15 NOTE — LETTER
8/15/2018      RE: Amelia Michel  [address]  [facility] [id number]       Dear Colleague,    Thank you for the opportunity to participate in the care of your patient, Amelia Michel, at the [facility] at [facility]. Please see a copy of my visit note below.    Electrophysiology Clinic Note    HPI: This is a 57 year old female who with a PMHx of VSD s/p repair 1969, RA, HTN, insomnia, atrial tachyarrhythmias, cardiac arrest, and DLBCL.     She was admitted from 5/15/17-5/23/17 with atrial tachyarrhythmias (SVT, AF, AFL) with symptoms of palpitations, fatigue, and nausea. An echo showed LVEF 40-45%, mildly reduced RVEF, moderate biatrial enlargement, mild mitral regurgitation, and moderate tricuspid regurgitation. . She was started on Eliquis and underwent a BRE/DCCV on 5/17/17 c/b hypotension and recurrent arrhythmia.She was started on Sotalol and chemically cardioverted. However, she had nausea and thought it was from the Sotalol so this was stopped. She reverted back to AF/AFL and a rate control strategy was adopted which was difficult given symptoms so she started amiodarone. CMRI  showed LVEF 55%, mildly dilated RV with focal area of dyskinesis in RVOT, severe bi-atrial enlargement, severe TR, mild/moderate MR, and DE at RV septal insertion site. RFA was discussed but was defered as patient had a UTI at the time with ESBL.    She was readmitted on 6/19/17-8/4/17 after suffering an out of hospital cardiac arrest.  Upon EMS arrival, she was in VF. She was externally defibrillated and had ROSC in the field. She was intubated and brought to [facility] found to be in escape rhythm 43 bpm. She was taken emergently to cath lab where coronary angiogram showed normal coronary arteries. Temporary pacemaker was placed in RA given sinus arrest. She was also placed on therapeutic hypothermia. She had been having nausea, diarrhea, and intermittent vomiting over the last week  "and generally had not been feeling well over the last month and also had saddle anesthesia with lower extremity numbness that had been evaluated by neuro as an outpatient.  Ultimately found to have DLBCL started on chemo cycles. 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      Regarding her DLBCL it was treated with R-EPOCH for one cycle while in the hospital complicated by cytopenias followed by 5 cycles of R-CHOP finished the cycles in December of 2017 currently on surveillance scans. She had a pericardial effusion for which she is on colchicine which was discontinued at the end of June.     She has then followed intermittently with us. We restarted digoxin 4/4/18 after which she had one episode of AF s/p DCCV and had maintained sinus rhythm and reported good symptomatic control. She was last seen in may she states that she feels well she has been having a feeling of her heart \"rolling\" daily lasting about about 10 sec at the most. The last time she required DCCV was in 3/2017.      Cardiac meds  - apixaban  Atenolol 25mg BID  Digoxin 125mcg/d  Furosemide 20mg/d  Spironolactone 25mg/d  Colchicine 0.6/d- stopped           PAST MEDICAL HISTORY:  Past Medical History:   Diagnosis Date     Antiplatelet or antithrombotic long-term use      Arrhythmia      Arthritis      Cancer (H)     lymphoma     Cardiac arrest (H)      History of chemotherapy      Hypertension      Lymphoma (H)      Neuropathy      PONV (postoperative nausea and vomiting)      Rheumatoid arthritis(714.0)        CURRENT MEDICATIONS:  Current Outpatient Prescriptions   Medication Sig Dispense Refill     acetaminophen (TYLENOL) 325 MG tablet Take 2 tablets (650 mg) by mouth every 4 hours as needed for mild pain, fever or headaches       apixaban ANTICOAGULANT (ELIQUIS) 5 MG tablet Take 1 tablet (5 mg) by mouth 2 times daily 180 tablet 3     " ascorbic acid 500 MG TABS Take 1 tablet (500 mg) by mouth daily 30 tablet 0     atenolol (TENORMIN) 25 MG tablet Take 1 tablet (25 mg) by mouth 2 times daily 180 tablet 3     calcium polycarbophil (FIBERCON) 625 MG tablet Take 1 tablet by mouth 2 times daily       calcium-vitamin D (CALTRATE) 600-400 MG-UNIT per tablet Take 2 tablets by mouth every morning 60 tablet 0     digoxin (LANOXIN) 125 MCG tablet Take 250mcg daily for 2 days, then decrease to 125mcg daily 90 tablet 3     furosemide (LASIX) 20 MG tablet Take 1 tablet (20 mg) by mouth daily 90 tablet 3     gabapentin (NEURONTIN) 100 MG capsule TAKE TWO CAPSULES BY MOUTH THREE TIMES A  capsule 2     HYDROCORTISONE PO Take 100 mg by mouth IV       hydroxychloroquine (PLAQUENIL) 200 MG tablet Take 1 tablet (200 mg) by mouth 2 times daily 60 tablet 5     multivitamin, therapeutic with minerals (THERA-VIT-M) TABS tablet Take 1 tablet by mouth daily 30 each 0     order for DME Equipment being ordered: Thigh high compression stockings Class 2: off the shelf or custom, farrow foot wrap and juxta fit premium lower leg wrap, lymphedema bandaging supplies 3 sets 1 Units 1     order for DME Equipment being ordered: BP cuff 1 Device 1     predniSONE (DELTASONE) 5 MG tablet Take 1 tablet (5 mg) by mouth daily 90 tablet 2     spironolactone (ALDACTONE) 25 MG tablet Take 1 tablet (25 mg) by mouth daily 90 tablet 0     tolterodine (DETROL LA) 4 MG 24 hr capsule Take 1 capsule (4 mg) by mouth daily 30 capsule 11     colchicine 0.6 MG tablet Take 1 tablet (0.6 mg) by mouth daily (Patient not taking: Reported on 8/3/2018) 60 tablet 0       PAST SURGICAL HISTORY:  Past Surgical History:   Procedure Laterality Date     ANESTHESIA CARDIOVERSION N/A 5/17/2017    Procedure: ANESTHESIA CARDIOVERSION;  ANESTHESIA CARDIOVERSION;  Surgeon: GENERIC ANESTHESIA PROVIDER;  Location: [facility] OR     ANESTHESIA CARDIOVERSION  3/12/2018    Procedure: ANESTHESIA CARDIOVERSION;;  Surgeon: GENERIC  ANESTHESIA PROVIDER;  Location: [facility] OR     ANESTHESIA CARDIOVERSION N/A 3/23/2018    Procedure: ANESTHESIA CARDIOVERSION;  Anesthesia Cardioversion ;  Surgeon: GENERIC ANESTHESIA PROVIDER;  Location: [facility] OR     ANESTHESIA CARDIOVERSION N/A 4/12/2018    Procedure: ANESTHESIA CARDIOVERSION;  Cardioversion;  Surgeon: GENERIC ANESTHESIA PROVIDER;  Location: [facility] OR     ARTHRODESIS WRIST  2000    Right wrist     BONE MARROW ASPIRATE &BIOPSY  7/12/2017          EXCISE LESION UPPER EXTREMITY Left 5/22/2018    Procedure: EXCISE LESION UPPER EXTREMITY;  Left Arm Wound Excision And Closure, Possible Submuscular Transposition of Ulnar Nerve  (Choice Anesthesia);  Surgeon: Kevin Sheldon MD;  Location: [facility] OR     FOOT SURGERY      4 left and 2 right     RELEASE CARPAL TUNNEL BILATERAL       REPAIR VENTRICULAR SEPTAL DEFECT  1969     TRANSPOSITION ULNAR NERVE (ELBOW) Left 5/22/2018    Procedure: TRANSPOSITION ULNAR NERVE (ELBOW);;  Surgeon: Kevin Sheldon MD;  Location: [facility] OR       ALLERGIES:     Allergies   Allergen Reactions     Blood Transfusion Related (Informational Only) Hives     Hives with platelets. Give benadryl premedication.     Metoprolol Other (See Comments)     Pt and  report that metoprolol does not work for her and also reports feeling unwell with this medication. She has been able to tolerate atenolol, which as worked in controlling her HR.      No Clinical Screening - See Comments      Penicillin Allergy Skin Test not performed, see antimicrobial management team progress note 7/5/17.       Penicillins      Tape [Adhesive Tape] Rash       FAMILY HISTORY:  Family History   Problem Relation Age of Onset     Breast Cancer Mother      Hypertension Mother      Alzheimer Disease Mother      Hypertension Father      Blood Disease Father      Lymphoa     Circulatory Father      A Fib     Diabetes Paternal Grandmother        SOCIAL HISTORY:  Social History   Substance Use Topics     Smoking status: Never Smoker      "Smokeless tobacco: Never Used     Alcohol use Yes      Comment: none       ROS:   A comprehensive 10 point review of systems negative other than as mentioned in HPI.    Exam:  /80 (BP Location: Right arm, Patient Position: Chair, Cuff Size: Adult Regular)  Pulse 61  Ht 1.473 m (4' 10\")  Wt 53.8 kg (118 lb 8 oz)  SpO2 95%  BMI 24.77 kg/m2  GENERAL APPEARANCE: alert and no distress  HEENT: alopecia, MMM, PERRLA.   NECK:supple.   RESPIRATORY: lungs clear to auscultation - no rales, rhonchi or wheezes, no use of accessory muscles, no retractions, respirations are unlabored, normal respiratory rate  CARDIOVASCULAR: normal S1 with physiologic split S2, holosystolic murmur   ABDOMEN: soft, non tender,bowel sounds normal  EXTREMITIES: peripheral pulses normal, trace pedal edema, LUE wound with dressing CDI.  NEURO: alert and oriented to person/place/time, normal speech, gait and affect  SKIN: pale, fragile, no rashes    Labs:  Reviewed.     Testing/Procedures:  cMRI 4/12/18  56 yo female with remote h/o VSD repair, atrial tachy-arrhythmias, cardiac arrest,  lymphoma in remission, and pericarditis seen on recent MRI.     1. The LV is normal in cavity size and wall thickness. The global systolic function is normal. The LVEF is  54%. There are no regional wall motion abnormalities.     2. The RV is mildly dilated in cavity size. The global systolic function is normal. The RVEF is 49%.      3. Both atria are severely enlarged.     4. There is at least moderate, possibly severe tricuspid regurgitation.      5. Late gadolinium enhancement imaging shows hyperenhancement in the RV insertion site consistent with RV  pressure/volume overload.      6. There is a loculated pericardial effusion along the basal lateral and basal to mid inferior LV walls,  and along the inferior RV wall. it appears exudative in nature, and is beginning to organize. There is  diffuse pericardial enhancement.     7. There is no intracardiac " thrombus or mass.     8. There is a small right pleural effusion.     CONCLUSIONS:  Loculated exudative pericardial effusion that is beginning to organize, with diffuse  pericardial enhancement consistent with ongoing inflammation. Normal LV fxn, LVEF 54% and RVEF 49%. At  least moderate, possibly severe tricuspid regurgitation. Compared to MRI from 03/2018, effusion is largely  unchanged in size (maybe a bit smaller) but is starting to organize. No change in pericardial enhancement  9/11/17 ECHOCARDIOGRAM:   Interpretation Summary  Left ventricular function, chamber size, wall motion, and wall thickness are  normal.The EF is 60-65% (traced 60%.) GLS -18%.  The right ventricle is normal size and normal in function. The RV is mildly  dilated.  Moderate tricuspid insufficiency is present.  Mild pulmonary hypertension is present (esimated PASP 55 mmHg including RA  pressure.)  Dilation of the inferior vena cava is present with abnormal respiratory  variation in diameter (esitimated RAP 15 mmHg.)  This study was compared with the study from 8/31/17: TR appears slightly  decreased from the earlier study.    7/24/17 CMRI:     1. The LV is normal in cavity size and wall thickness. The global systolic function is normal. The LVEF is  64%. There are no regional wall motion abnormalities. No evidence of myocardial iron overload.      2. The RV is normal in cavity size. The global systolic function is normal. The RVEF is 59%.      3. There is moderate dilation of bilateral atria.      4. Tricuspid regurgitation is present, difficult to quantify due to image quality.      5. Late gadolinium enhancement imaging shows RV insertion site hyperenhancement, a non-specific finding  seen in patients with RV volume/pressure overload.      6. There is a moderate right pleural effusion and small left pleural effusion.      CONCLUSIONS: Normal Bi-Ventricular systolic function, LVEF 64% and RVE 59%. There is no evidence of  infiltrative  disease. Compared to the MRI from 5/15/2017, there is no significant change.     7/2017 BRE:  Interpretation Summary  There is a small mass (0.7 cm by 0.2 cm) on the ventricular side of the right  coronary cusp. There is a second mass (0.4 cm by 0.2 cm) observed in the LVOT,  attached to the mitro-aortic curtain. There is a third mass on the noncoronary  cusp that measures 0.4 cm by 0.2 cm that could be a healing vegetation. These  findings are compatible with endocarditis if clinical picture supports.  Right ventricular function, chamber size, wall motion, and thickness are  normal.  This study was compared with the study from 7/10/2017.  There is detection of masses on the aortic valve and LVOT.    Cardiac MRI - 4/12/18:  1. The LV is normal in cavity size and wall thickness. The global systolic function is normal. The LVEF is  54%. There are no regional wall motion abnormalities.     2. The RV is mildly dilated in cavity size. The global systolic function is normal. The RVEF is 49%.      3. Both atria are severely enlarged.     4. There is at least moderate, possibly severe tricuspid regurgitation.      5. Late gadolinium enhancement imaging shows hyperenhancement in the RV insertion site consistent with RV  pressure/volume overload.      6. There is a loculated pericardial effusion along the basal lateral and basal to mid inferior LV walls,  and along the inferior RV wall. it appears exudative in nature, and is beginning to organize. There is  diffuse pericardial enhancement.     7. There is no intracardiac thrombus or mass.     8. There is a small right pleural effusion.    Assessment and Plan:   Ms. Michel is a 57 year old female who has a past medical history significant for VSD s/p repair 1969, RA, HTN, insomnia, atrial tachyarrhythmias, cardiac arrest, and newly diagnosed DLBCL.     Atrial Fibrillation:  -. Stroke Prophylaxis:  CHADSVASC=+HTN, +gender  2, corresponding to a 2.2% annual stroke / systemic  [facility] event rate. indicating need for long term oral anticoagulation.  She is now able to be anticoagulated per her Oncology team. We will continue Eliquis 5 mg BID.   2. Rate Control: Continue Atenolol and Digoxin.    3. Rhythm Control: Cardioversion, Antiarrhythmics and/or ablation are options for rhythm control. Since her last DCCV on 4/12 she has remained in NSR. Notably, she has possible side effects from Sotalol (however, likely was due to underlying illness at the time and not from medication since she was found to have DBCL).  If she continues to have frequent symptomatic episodes of AF/AFl, given her history of adverse reactions to AAT, she may be a candidate for ablation. Will not change treatment at this point as she has had no recent recurrences.      Exudative pericardial effusion  - last noted in cMRI 4/12/18 beginning to organize with diffuse pericardial enhancement. Asymptomatic with no shortness of breath or signs or symptoms of constriction.   - she finished a course of colchicine in June  - will repeat cMRI at the beginning of Oct.    Case seen and discussed with Dr. Angelito Ley Sweet  Cardiology fellow, PGY-5    EP STAFF NOTE  Patient seen and examined and management plan personally reviewed with the patient. I agree with the note above by the CV/EP fellow.    Please do not hesitate to contact me if you have any questions/concerns.     Sincerely,     Juan Eaton MD

## 2018-08-15 NOTE — PROGRESS NOTES
Electrophysiology Clinic Note    HPI: This is a 57 year old female who with a PMHx of VSD s/p repair 1969, RA, HTN, insomnia, atrial tachyarrhythmias, cardiac arrest, and DLBCL.     She was admitted from 5/15/17-5/23/17 with atrial tachyarrhythmias (SVT, AF, AFL) with symptoms of palpitations, fatigue, and nausea. An echo showed LVEF 40-45%, mildly reduced RVEF, moderate biatrial enlargement, mild mitral regurgitation, and moderate tricuspid regurgitation. . She was started on Eliquis and underwent a BRE/DCCV on 5/17/17 c/b hypotension and recurrent arrhythmia.She was started on Sotalol and chemically cardioverted. However, she had nausea and thought it was from the Sotalol so this was stopped. She reverted back to AF/AFL and a rate control strategy was adopted which was difficult given symptoms so she started amiodarone. CMRI  showed LVEF 55%, mildly dilated RV with focal area of dyskinesis in RVOT, severe bi-atrial enlargement, severe TR, mild/moderate MR, and DE at RV septal insertion site. RFA was discussed but was defered as patient had a UTI at the time with ESBL.    She was readmitted on 6/19/17-8/4/17 after suffering an out of hospital cardiac arrest.  Upon EMS arrival, she was in VF. She was externally defibrillated and had ROSC in the field. She was intubated and brought to [facility] found to be in escape rhythm 43 bpm. She was taken emergently to cath lab where coronary angiogram showed normal coronary arteries. Temporary pacemaker was placed in RA given sinus arrest. She was also placed on therapeutic hypothermia. She had been having nausea, diarrhea, and intermittent vomiting over the last week and generally had not been feeling well over the last month and also had saddle anesthesia with lower extremity numbness that had been evaluated by neuro as an outpatient.  Ultimately found to have DLBCL started on chemo cycles. A BRE in 7/2017 showed small masses on coronary cusps and LVOT area possibly Libmann-Sack  "vs. Lymphoma and was eventually discharged to TCU on a lifevest.      Regarding her DLBCL it was treated with R-EPOCH for one cycle while in the hospital complicated by cytopenias followed by 5 cycles of R-CHOP finished the cycles in December of 2017 currently on surveillance scans. She had a pericardial effusion for which she is on colchicine which was discontinued at the end of June.     She has then followed intermittently with us. We restarted digoxin 4/4/18 after which she had one episode of AF s/p DCCV and had maintained sinus rhythm and reported good symptomatic control. She was last seen in may she states that she feels well she has been having a feeling of her heart \"rolling\" daily lasting about about 10 sec at the most. The last time she required DCCV was in 3/2017.      Cardiac meds  - apixaban  Atenolol 25mg BID  Digoxin 125mcg/d  Furosemide 20mg/d  Spironolactone 25mg/d  Colchicine 0.6/d- stopped           PAST MEDICAL HISTORY:  Past Medical History:   Diagnosis Date     Antiplatelet or antithrombotic long-term use      Arrhythmia      Arthritis      Cancer (H)     lymphoma     Cardiac arrest (H)      History of chemotherapy      Hypertension      Lymphoma (H)      Neuropathy      PONV (postoperative nausea and vomiting)      Rheumatoid arthritis(714.0)        CURRENT MEDICATIONS:  Current Outpatient Prescriptions   Medication Sig Dispense Refill     acetaminophen (TYLENOL) 325 MG tablet Take 2 tablets (650 mg) by mouth every 4 hours as needed for mild pain, fever or headaches       apixaban ANTICOAGULANT (ELIQUIS) 5 MG tablet Take 1 tablet (5 mg) by mouth 2 times daily 180 tablet 3     ascorbic acid 500 MG TABS Take 1 tablet (500 mg) by mouth daily 30 tablet 0     atenolol (TENORMIN) 25 MG tablet Take 1 tablet (25 mg) by mouth 2 times daily 180 tablet 3     calcium polycarbophil (FIBERCON) 625 MG tablet Take 1 tablet by mouth 2 times daily       calcium-vitamin D (CALTRATE) 600-400 MG-UNIT per tablet " Take 2 tablets by mouth every morning 60 tablet 0     digoxin (LANOXIN) 125 MCG tablet Take 250mcg daily for 2 days, then decrease to 125mcg daily 90 tablet 3     furosemide (LASIX) 20 MG tablet Take 1 tablet (20 mg) by mouth daily 90 tablet 3     gabapentin (NEURONTIN) 100 MG capsule TAKE TWO CAPSULES BY MOUTH THREE TIMES A  capsule 2     HYDROCORTISONE PO Take 100 mg by mouth IV       hydroxychloroquine (PLAQUENIL) 200 MG tablet Take 1 tablet (200 mg) by mouth 2 times daily 60 tablet 5     multivitamin, therapeutic with minerals (THERA-VIT-M) TABS tablet Take 1 tablet by mouth daily 30 each 0     order for DME Equipment being ordered: Thigh high compression stockings Class 2: off the shelf or custom, farrow foot wrap and juxta fit premium lower leg wrap, lymphedema bandaging supplies 3 sets 1 Units 1     order for DME Equipment being ordered: BP cuff 1 Device 1     predniSONE (DELTASONE) 5 MG tablet Take 1 tablet (5 mg) by mouth daily 90 tablet 2     spironolactone (ALDACTONE) 25 MG tablet Take 1 tablet (25 mg) by mouth daily 90 tablet 0     tolterodine (DETROL LA) 4 MG 24 hr capsule Take 1 capsule (4 mg) by mouth daily 30 capsule 11     colchicine 0.6 MG tablet Take 1 tablet (0.6 mg) by mouth daily (Patient not taking: Reported on 8/3/2018) 60 tablet 0       PAST SURGICAL HISTORY:  Past Surgical History:   Procedure Laterality Date     ANESTHESIA CARDIOVERSION N/A 5/17/2017    Procedure: ANESTHESIA CARDIOVERSION;  ANESTHESIA CARDIOVERSION;  Surgeon: GENERIC ANESTHESIA PROVIDER;  Location: [facility] OR     ANESTHESIA CARDIOVERSION  3/12/2018    Procedure: ANESTHESIA CARDIOVERSION;;  Surgeon: GENERIC ANESTHESIA PROVIDER;  Location: [facility] OR     ANESTHESIA CARDIOVERSION N/A 3/23/2018    Procedure: ANESTHESIA CARDIOVERSION;  Anesthesia Cardioversion ;  Surgeon: GENERIC ANESTHESIA PROVIDER;  Location: [facility] OR     ANESTHESIA CARDIOVERSION N/A 4/12/2018    Procedure: ANESTHESIA CARDIOVERSION;  Cardioversion;  Surgeon:  "GENERIC ANESTHESIA PROVIDER;  Location: [facility] OR     ARTHRODESIS WRIST  2000    Right wrist     BONE MARROW ASPIRATE &BIOPSY  7/12/2017          EXCISE LESION UPPER EXTREMITY Left 5/22/2018    Procedure: EXCISE LESION UPPER EXTREMITY;  Left Arm Wound Excision And Closure, Possible Submuscular Transposition of Ulnar Nerve  (Choice Anesthesia);  Surgeon: Kevin Sheldon MD;  Location: [facility] OR     FOOT SURGERY      4 left and 2 right     RELEASE CARPAL TUNNEL BILATERAL       REPAIR VENTRICULAR SEPTAL DEFECT  1969     TRANSPOSITION ULNAR NERVE (ELBOW) Left 5/22/2018    Procedure: TRANSPOSITION ULNAR NERVE (ELBOW);;  Surgeon: Kevin Sheldon MD;  Location: [facility] OR       ALLERGIES:     Allergies   Allergen Reactions     Blood Transfusion Related (Informational Only) Hives     Hives with platelets. Give benadryl premedication.     Metoprolol Other (See Comments)     Pt and  report that metoprolol does not work for her and also reports feeling unwell with this medication. She has been able to tolerate atenolol, which as worked in controlling her HR.      No Clinical Screening - See Comments      Penicillin Allergy Skin Test not performed, see antimicrobial management team progress note 7/5/17.       Penicillins      Tape [Adhesive Tape] Rash       FAMILY HISTORY:  Family History   Problem Relation Age of Onset     Breast Cancer Mother      Hypertension Mother      Alzheimer Disease Mother      Hypertension Father      Blood Disease Father      Lymphoa     Circulatory Father      A Fib     Diabetes Paternal Grandmother        SOCIAL HISTORY:  Social History   Substance Use Topics     Smoking status: Never Smoker     Smokeless tobacco: Never Used     Alcohol use Yes      Comment: none       ROS:   A comprehensive 10 point review of systems negative other than as mentioned in HPI.    Exam:  /80 (BP Location: Right arm, Patient Position: Chair, Cuff Size: Adult Regular)  Pulse 61  Ht 1.473 m (4' 10\")  Wt 53.8 kg (118 lb " 8 oz)  SpO2 95%  BMI 24.77 kg/m2  GENERAL APPEARANCE: alert and no distress  HEENT: alopecia, MMM, PERRLA.   NECK:supple.   RESPIRATORY: lungs clear to auscultation - no rales, rhonchi or wheezes, no use of accessory muscles, no retractions, respirations are unlabored, normal respiratory rate  CARDIOVASCULAR: normal S1 with physiologic split S2, holosystolic murmur   ABDOMEN: soft, non tender,bowel sounds normal  EXTREMITIES: peripheral pulses normal, trace pedal edema, LUE wound with dressing CDI.  NEURO: alert and oriented to person/place/time, normal speech, gait and affect  SKIN: pale, fragile, no rashes    Labs:  Reviewed.     Testing/Procedures:  cMRI 4/12/18  56 yo female with remote h/o VSD repair, atrial tachy-arrhythmias, cardiac arrest,  lymphoma in remission, and pericarditis seen on recent MRI.     1. The LV is normal in cavity size and wall thickness. The global systolic function is normal. The LVEF is  54%. There are no regional wall motion abnormalities.     2. The RV is mildly dilated in cavity size. The global systolic function is normal. The RVEF is 49%.      3. Both atria are severely enlarged.     4. There is at least moderate, possibly severe tricuspid regurgitation.      5. Late gadolinium enhancement imaging shows hyperenhancement in the RV insertion site consistent with RV  pressure/volume overload.      6. There is a loculated pericardial effusion along the basal lateral and basal to mid inferior LV walls,  and along the inferior RV wall. it appears exudative in nature, and is beginning to organize. There is  diffuse pericardial enhancement.     7. There is no intracardiac thrombus or mass.     8. There is a small right pleural effusion.     CONCLUSIONS:  Loculated exudative pericardial effusion that is beginning to organize, with diffuse  pericardial enhancement consistent with ongoing inflammation. Normal LV fxn, LVEF 54% and RVEF 49%. At  least moderate, possibly severe tricuspid  regurgitation. Compared to MRI from 03/2018, effusion is largely  unchanged in size (maybe a bit smaller) but is starting to organize. No change in pericardial enhancement  9/11/17 ECHOCARDIOGRAM:   Interpretation Summary  Left ventricular function, chamber size, wall motion, and wall thickness are  normal.The EF is 60-65% (traced 60%.) GLS -18%.  The right ventricle is normal size and normal in function. The RV is mildly  dilated.  Moderate tricuspid insufficiency is present.  Mild pulmonary hypertension is present (esimated PASP 55 mmHg including RA  pressure.)  Dilation of the inferior vena cava is present with abnormal respiratory  variation in diameter (esitimated RAP 15 mmHg.)  This study was compared with the study from 8/31/17: TR appears slightly  decreased from the earlier study.    7/24/17 CMRI:     1. The LV is normal in cavity size and wall thickness. The global systolic function is normal. The LVEF is  64%. There are no regional wall motion abnormalities. No evidence of myocardial iron overload.      2. The RV is normal in cavity size. The global systolic function is normal. The RVEF is 59%.      3. There is moderate dilation of bilateral atria.      4. Tricuspid regurgitation is present, difficult to quantify due to image quality.      5. Late gadolinium enhancement imaging shows RV insertion site hyperenhancement, a non-specific finding  seen in patients with RV volume/pressure overload.      6. There is a moderate right pleural effusion and small left pleural effusion.      CONCLUSIONS: Normal Bi-Ventricular systolic function, LVEF 64% and RVE 59%. There is no evidence of  infiltrative disease. Compared to the MRI from 5/15/2017, there is no significant change.     7/2017 BRE:  Interpretation Summary  There is a small mass (0.7 cm by 0.2 cm) on the ventricular side of the right  coronary cusp. There is a second mass (0.4 cm by 0.2 cm) observed in the LVOT,  attached to the mitro-aortic curtain.  There is a third mass on the noncoronary  cusp that measures 0.4 cm by 0.2 cm that could be a healing vegetation. These  findings are compatible with endocarditis if clinical picture supports.  Right ventricular function, chamber size, wall motion, and thickness are  normal.  This study was compared with the study from 7/10/2017.  There is detection of masses on the aortic valve and LVOT.    Cardiac MRI - 4/12/18:  1. The LV is normal in cavity size and wall thickness. The global systolic function is normal. The LVEF is  54%. There are no regional wall motion abnormalities.     2. The RV is mildly dilated in cavity size. The global systolic function is normal. The RVEF is 49%.      3. Both atria are severely enlarged.     4. There is at least moderate, possibly severe tricuspid regurgitation.      5. Late gadolinium enhancement imaging shows hyperenhancement in the RV insertion site consistent with RV  pressure/volume overload.      6. There is a loculated pericardial effusion along the basal lateral and basal to mid inferior LV walls,  and along the inferior RV wall. it appears exudative in nature, and is beginning to organize. There is  diffuse pericardial enhancement.     7. There is no intracardiac thrombus or mass.     8. There is a small right pleural effusion.    Assessment and Plan:   Ms. Michel is a 57 year old female who has a past medical history significant for VSD s/p repair 1969, RA, HTN, insomnia, atrial tachyarrhythmias, cardiac arrest, and newly diagnosed DLBCL.     Atrial Fibrillation:  -. Stroke Prophylaxis:  CHADSVASC=+HTN, +gender  2, corresponding to a 2.2% annual stroke / systemic emolism event rate. indicating need for long term oral anticoagulation.  She is now able to be anticoagulated per her Oncology team. We will continue Eliquis 5 mg BID.   2. Rate Control: Continue Atenolol and Digoxin.    3. Rhythm Control: Cardioversion, Antiarrhythmics and/or ablation are options for rhythm control.  Since her last DCCV on 4/12 she has remained in NSR. Notably, she has possible side effects from Sotalol (however, likely was due to underlying illness at the time and not from medication since she was found to have DBCL).  If she continues to have frequent symptomatic episodes of AF/AFl, given her history of adverse reactions to AAT, she may be a candidate for ablation. Will not change treatment at this point as she has had no recent recurrences.      Exudative pericardial effusion  - last noted in cMRI 4/12/18 beginning to organize with diffuse pericardial enhancement. Asymptomatic with no shortness of breath or signs or symptoms of constriction.   - she finished a course of colchicine in June  - will repeat cMRI at the beginning of Oct.    Case seen and discussed with Dr. Angelito Ley [facility]  Cardiology fellow, PGY-5    EP STAFF NOTE  Patient seen and examined and management plan personally reviewed with the patient. I agree with the note above by the CV/EP fellow.  Juan Eaton MD [facility]  Cardiology - Electrophysiology

## 2018-08-15 NOTE — PATIENT INSTRUCTIONS
Cardiology Provider you saw in clinic today: Dr. Eaton    Labs/Tests needed:     1. Cardiac MRI in October     Follow-up Visit:  6 months        Please contact us via [employer] for questions or concerns.    Stacie Delgado RN   Electrophysiology Nurse Coordinator.  [phone]    If your question concerns the schedule/appointment times, contact:  [facility]   [phone]    Device Clinic (Pacemakers, ICD, Loop Recorders)   [phone]      You will receive all normal lab and testing results via [employer] or mail if not reviewed in clinic today.   If you need a medication refill please contact your pharmacy.    As always, thank you for trusting us with your health care needs!

## 2018-08-15 NOTE — NURSING NOTE
Vitals completed successfully and medication reconciled. Ekg done. Juanita Ruano MA  Chief Complaint   Patient presents with     Follow Up For     3 mo follow-up AF.

## 2018-08-16 LAB — INTERPRETATION ECG - MUSE: NORMAL

## 2018-09-26 ENCOUNTER — RADIANT APPOINTMENT (OUTPATIENT)
Dept: CT IMAGING | Facility: CLINIC | Age: 58
End: 2018-09-26
Attending: INTERNAL MEDICINE
Payer: COMMERCIAL

## 2018-09-26 ENCOUNTER — OFFICE VISIT (OUTPATIENT)
Dept: TRANSPLANT | Facility: CLINIC | Age: 58
End: 2018-09-26
Attending: INTERNAL MEDICINE
Payer: COMMERCIAL

## 2018-09-26 ENCOUNTER — APPOINTMENT (OUTPATIENT)
Dept: LAB | Facility: CLINIC | Age: 58
End: 2018-09-26
Attending: INTERNAL MEDICINE
Payer: COMMERCIAL

## 2018-09-26 VITALS
HEIGHT: 58 IN | TEMPERATURE: 97.9 F | OXYGEN SATURATION: 99 % | HEART RATE: 49 BPM | DIASTOLIC BLOOD PRESSURE: 84 MMHG | WEIGHT: 122.9 LBS | BODY MASS INDEX: 25.8 KG/M2 | SYSTOLIC BLOOD PRESSURE: 136 MMHG

## 2018-09-26 DIAGNOSIS — C83.398 DIFFUSE LARGE B-CELL LYMPHOMA OF EXTRANODAL SITE: ICD-10-CM

## 2018-09-26 DIAGNOSIS — G62.9 NEUROPATHY: Primary | ICD-10-CM

## 2018-09-26 LAB
ALBUMIN SERPL-MCNC: 3.8 G/DL (ref 3.4–5)
ALP SERPL-CCNC: 134 U/L (ref 40–150)
ALT SERPL W P-5'-P-CCNC: 50 U/L (ref 0–50)
ANION GAP SERPL CALCULATED.3IONS-SCNC: 10 MMOL/L (ref 3–14)
AST SERPL W P-5'-P-CCNC: 46 U/L (ref 0–45)
BASOPHILS # BLD AUTO: 0 10E9/L (ref 0–0.2)
BASOPHILS NFR BLD AUTO: 0.6 %
BILIRUB SERPL-MCNC: 0.6 MG/DL (ref 0.2–1.3)
BUN SERPL-MCNC: 19 MG/DL (ref 7–30)
CALCIUM SERPL-MCNC: 8.7 MG/DL (ref 8.5–10.1)
CHLORIDE SERPL-SCNC: 103 MMOL/L (ref 94–109)
CO2 SERPL-SCNC: 23 MMOL/L (ref 20–32)
CREAT BLD-MCNC: 0.8 MG/DL (ref 0.52–1.04)
CREAT SERPL-MCNC: 0.65 MG/DL (ref 0.52–1.04)
DIFFERENTIAL METHOD BLD: ABNORMAL
EOSINOPHIL # BLD AUTO: 0 10E9/L (ref 0–0.7)
EOSINOPHIL NFR BLD AUTO: 0.9 %
ERYTHROCYTE [DISTWIDTH] IN BLOOD BY AUTOMATED COUNT: 12.5 % (ref 10–15)
GFR SERPL CREATININE-BSD FRML MDRD: 74 ML/MIN/1.7M2
GFR SERPL CREATININE-BSD FRML MDRD: >90 ML/MIN/1.7M2
GLUCOSE SERPL-MCNC: 138 MG/DL (ref 70–99)
HCT VFR BLD AUTO: 42.4 % (ref 35–47)
HGB BLD-MCNC: 14.2 G/DL (ref 11.7–15.7)
IMM GRANULOCYTES # BLD: 0 10E9/L (ref 0–0.4)
IMM GRANULOCYTES NFR BLD: 0.3 %
LYMPHOCYTES # BLD AUTO: 0.5 10E9/L (ref 0.8–5.3)
LYMPHOCYTES NFR BLD AUTO: 14.1 %
MCH RBC QN AUTO: 36 PG (ref 26.5–33)
MCHC RBC AUTO-ENTMCNC: 33.5 G/DL (ref 31.5–36.5)
MCV RBC AUTO: 108 FL (ref 78–100)
MONOCYTES # BLD AUTO: 0.5 10E9/L (ref 0–1.3)
MONOCYTES NFR BLD AUTO: 13.8 %
NEUTROPHILS # BLD AUTO: 2.4 10E9/L (ref 1.6–8.3)
NEUTROPHILS NFR BLD AUTO: 70.3 %
NRBC # BLD AUTO: 0 10*3/UL
NRBC BLD AUTO-RTO: 0 /100
PLATELET # BLD AUTO: 130 10E9/L (ref 150–450)
POTASSIUM SERPL-SCNC: 4 MMOL/L (ref 3.4–5.3)
PROT SERPL-MCNC: 7 G/DL (ref 6.8–8.8)
RBC # BLD AUTO: 3.94 10E12/L (ref 3.8–5.2)
SODIUM SERPL-SCNC: 135 MMOL/L (ref 133–144)
WBC # BLD AUTO: 3.5 10E9/L (ref 4–11)

## 2018-09-26 PROCEDURE — 82784 ASSAY IGA/IGD/IGG/IGM EACH: CPT | Performed by: INTERNAL MEDICINE

## 2018-09-26 PROCEDURE — 85025 COMPLETE CBC W/AUTO DIFF WBC: CPT | Performed by: INTERNAL MEDICINE

## 2018-09-26 PROCEDURE — 80053 COMPREHEN METABOLIC PANEL: CPT | Performed by: INTERNAL MEDICINE

## 2018-09-26 PROCEDURE — G0463 HOSPITAL OUTPT CLINIC VISIT: HCPCS | Mod: ZF

## 2018-09-26 PROCEDURE — 36592 COLLECT BLOOD FROM PICC: CPT

## 2018-09-26 RX ORDER — IOPAMIDOL 755 MG/ML
73 INJECTION, SOLUTION INTRAVASCULAR ONCE
Status: COMPLETED | OUTPATIENT
Start: 2018-09-26 | End: 2018-09-26

## 2018-09-26 RX ADMIN — IOPAMIDOL 73 ML: 755 INJECTION, SOLUTION INTRAVASCULAR at 10:22

## 2018-09-26 NOTE — PROGRESS NOTES
[facility] Visit  9/26/2018         Disease and Treatment History:  1. Complicated patient with history of Rheumatoid Arthritis status post long term treatment with methotrexate with recent significant complicated course with cardiac arrest, admission with hypotension, sepsis, ICU stay and intubation with subsequent additional readmission from TCU in 6/2017 for FUO with extensive work-up with eventual finding of progression cytopenias with eventual lymphoma diagnosis  2. Bone Marrow Biopsy: 7/12/2017: Highly suspicious for involvement by B cell lymphoma with foci of large atypical CD20+ cells  3. PET CT 7/19/2017: Extensive activity: Right paratracheal lesion with SUV 28, many liver lesions with SUV 15, cardiac lesion intraarterial septum, numerous bone lesions.  4. 7/21 Liver Biopsy: Consistent with Diffuse Large B Cell Lymphoma non-germinal center phenotype  -- FISH for myc, bcl2, bcl6 negative for double-hit lymphoma  5. IPI based on Age < 60 (0 points) + PS 2 (1 + point) + Stage IV Disease (1 point) + Extranodal disease > 1 site (1 point) + elevated LDH (1 point) = 4 Poor Risk Disease  6. Cycle 1 given as R-EPOCH Day 1 = 7/27/2017  -- complications of transfusion dependence and need for acute rehab placement (likely more result of extensive hospital stays)  - LP negative for CNS involvement 8/23/2017  7. Cycle #2: Transition to R-CHOP Day 1 = 8/22/2017  - Day 15 high dose MTX for CNS prophylaxis  - complicated by significant fluid overload and PICC associated DVT  8. Cycle #3 Day 1 = 9/20/2017 Post Cycle 3 PET CR. Cycle 4 10/11/2017 + IT prophylaxis, Cycle 5 11/8/17, Cycle 6 11/29/2017 + IT prophylaxis  -- Post Treatment completion PET 1/15/2018: Complete Remission      HPI: Amelia and her  come in today to review CT scans and follow-up her lymphoma.  In general she has Been continuing to improve.  She still notes periods of fatigue and not having much energy but on other days, especially the  "Sunday days, she has more energy to do things.  She notes no fevers chills night sweats no chest pain no shortness of breath.  She notes no nausea or vomiting and no significant edema.  She notes ongoing neuropathy worst in her left hand and right foot and is on gabapentin for this but does not want to increase the dose as it affects her memory.  She notes that she is recovering well from her left arm surgery and that continues to make progress.        10 point ROS otherwise negative      Physical Exam:  /84 (BP Location: Left arm, Patient Position: Left side, Cuff Size: Adult Regular)  Pulse (!) 49  Temp 97.9  F (36.6  C) (Oral)  Ht 1.473 m (4' 10\")  Wt 55.7 kg (122 lb 14.4 oz)  SpO2 99%  BMI 25.69 kg/m2    Gen: looks well. KPS 80  HEENT: OP clear, no erythema or thrush, no palpable MCKAYLA  Lungs:CTAB no crackles or wheezes  CV: stable loud systolic murmur. Regular on exam today  Abd:soft  Ext:no edema. Brace on right leg. Stockings on  - Left arm healing well      Labs:  Results for FIDELIA MIDDLETON (MRN [id number]) as of 9/26/2018 14:23   Ref. Range 9/26/2018 14:14   WBC Latest Ref Range: 4.0 - 11.0 10e9/L 3.5 (L)   Hemoglobin Latest Ref Range: 11.7 - 15.7 g/dL 14.2   Hematocrit Latest Ref Range: 35.0 - 47.0 % 42.4   Platelet Count Latest Ref Range: 150 - 450 10e9/L 130 (L)   RBC Count Latest Ref Range: 3.8 - 5.2 10e12/L 3.94   MCV Latest Ref Range: 78 - 100 fl 108 (H)   MCH Latest Ref Range: 26.5 - 33.0 pg 36.0 (H)   MCHC Latest Ref Range: 31.5 - 36.5 g/dL 33.5   RDW Latest Ref Range: 10.0 - 15.0 % 12.5   Diff Method Unknown Automated Method   % Neutrophils Latest Units: % 70.3   % Lymphocytes Latest Units: % 14.1   % Monocytes Latest Units: % 13.8   % Eosinophils Latest Units: % 0.9   % Basophils Latest Units: % 0.6   % Immature Granulocytes Latest Units: % 0.3   Nucleated RBCs Latest Ref Range: 0 /100 0   Absolute Neutrophil Latest Ref Range: 1.6 - 8.3 10e9/L 2.4   Absolute Lymphocytes Latest Ref " Range: 0.8 - 5.3 10e9/L 0.5 (L)   Absolute Monocytes Latest Ref Range: 0.0 - 1.3 10e9/L 0.5   Absolute Eosinophils Latest Ref Range: 0.0 - 0.7 10e9/L 0.0   Absolute Basophils Latest Ref Range: 0.0 - 0.2 10e9/L 0.0   Abs Immature Granulocytes Latest Ref Range: 0 - 0.4 10e9/L 0.0   Absolute Nucleated RBC Unknown 0.0       CT Chest/Abd/Pelvis:  Chest:   Multinodular thyroid with stable nodule extending into the superior  anterior superior mediastinum. Cardiomegaly with biatrial enlargement.  Postoperative changes of CABG. Sternal wires are unremarkable. Stable  substernal calcifications. Major vasculature are within normal limits.  No large central pulmonary embolism. Small pericardial effusion with  associated calcifications, decreased in size from 5/24/2018. Esophagus  unremarkable. No significant hilar or axillary lymphadenopathy. Stable  1.1 cm prevascular lymph node (series 3, image 103), additional stable  subcentimeter mediastinal lymph nodes.     Central tracheobronchial tree is clear. No bronchial wall thickening  or bronchiectasis. No pleural effusion or pneumothorax. Mild dependent  atelectasis. Linear areas of fibroatelectasis. No new or enlarged  pulmonary nodule.     Abdomen and pelvis:   No significant abdominal lymphadenopathy by size criteria.     Heterogeneous hepatic attenuation. Subcentimeter hypoattenuating foci  in the liver, too small accurately characterize, unchanged. Accessory  right hepatic vein. Portal venous system is patent. Poor contrast  opacification of the hepatic veins. Spleen, gallbladder, pancreas are  unremarkable. The adrenal glands are within normal limits. Symmetric  renal parenchymal enhancement. Subcentimeter bilateral renal cortical  hypodensities, many of which are too small accurately characterize.  1.1 cm anterior left lower pole renal cyst. No hydronephrosis or  hydroureter. Bladder is unremarkable. Uterus and adnexa are  unremarkable. No dilated loops of bowel. The  appendix is unremarkable.  Moderate colonic stool.     Abdominal vasculature is patent and within normal limits with mild  calcified atherosclerotic plaque.      Bones and soft tissues: Multilevel degenerative changes of the spine.  Stable left lateral listhesis of L2 on L3. S-shaped scoliotic  curvature of the thoracolumbar spine. Postoperative changes of bone  marrow biopsy. Chronic bilateral anterior healed rib deformities. No  aggressive appearing osseous lesion.         IMPRESSION: In this patient with a history of lymphoma:  1. No evidence of disease recurrence, consistent with complete  response per [place] criteria.  2. Stable cardiomegaly. Improved pericardial effusion.  3. Early contrast phase with heterogeneous visceral enhancement in the  abdomen, likely secondary to cardiac dysfunction.     I have personally reviewed the examination and initial interpretation  and I agree with the findings.     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 Lymphoma: Got cycle #1 in the hospital and I chose R-EPOCH for infusional nature due to possible intracardiac involvement and also extensive disease to allow for slower lymphoma kill. Tolerated this well, aside from significant cytopenias (which were present at diagnosis). Then transitioned to R-CHOP to finish out a total of 6 cycles of chemotherapy (1 R-EPOCH and 5 R-CHOP) with initially planned for High Dose MTX on Day 15 of cycle 2,4, and 6 due to high IPI but then transition to prophy IT chemo on cycle 4 and 6 due to toxicity with the high dose methotrexate.   -- PET CT post 3 cycles CR1 and repeat PET CT post therapy completion showed CR1.     -- Follow-up scans now show ongoing CR. Plan for repeat CT scan in 6 months again.    2. ID:  Not on any abx at this point     3. Rheumatoid arthritis: is on prednisone 5 mg daily and plaquenil.  Stable    4. CV: A. Fib. On digoxin and atenolol. Status post numerous ablations as above but best control seems to correlate with the digoxin. Currently in normal rate and rhythm  -- Pericardial effusion noted during hospitalization in 3/2018 and pericarditis on 3/8/2018 MRI. Repeat MRI 4/12/2018 showed ongoing pericardial effusion with the start of organization felt to be ongoing inflammation. CT Chest today shows ongoing improvement    5. Heme: Counts good today.    5. Wound: status post surgery on 5/22/18 and continues to improve    7. Neuropathy: on gabapentin. Stable. Trial of gabapentin gel to the foot to see if that helps and she can decrease her oral medication which has an impact on her memory      Final Plan:  - return to clinic in 6 months for repeat scans and follow-up    Lenore Rodriguez

## 2018-09-26 NOTE — MR AVS SNAPSHOT
After Visit Summary   9/26/2018    Amelia Michel    MRN: [id number]           Patient Information     Date Of Birth          1960        Visit Information        Provider Department      9/26/2018 1:45 PM Lenore Rodriguez MD M [facility]        Today's Diagnoses     Neuropathy    -  1    Diffuse large B-cell lymphoma of extranodal site (H)              [facility] [place] [facility]  [address] [place] [address]  Phone: [phone]  Clinic Hours:   Monday-Thursday:7am to 7pm   Friday: 7am to 5pm   Weekends and holidays:    8am to noon (in general)  If your fever is 100.5  or greater,   call the clinic.  After hours call the   hospital at [phone] or   [phone]. Ask for the BMT   fellow on-call           Care Instructions    [facility] in 6 months with labs prior and CT scans          Follow-ups after your visit        Your next 10 appointments already scheduled     Oct 03, 2018  1:30 PM CDT   MA SCREENING DIGITAL BILATERAL with [facility] [place] [address] Imaging ([facility])    [address] [facility] [address] [phone]           How do I prepare for my exam? (Food and drink instructions) No Food and Drink Restrictions.  How do I prepare for my exam? (Other instructions) Do not use any powder, lotion or deodorant under your arms or on your breast. If you do, we will ask you to remove it before your exam.  What should I wear: Wear comfortable, two-piece clothing.  How long does the exam take: Most scans will take 15 minutes.  What should I bring: Bring any previous mammograms from other facilities or have them mailed to the breast center.  Do I need a :  No  is needed.  What do I need to tell my doctor: If you have any allergies, tell your care team.  What should I do after the exam: No restrictions, You may resume normal activities.  What is this test: This test is an x-ray of the breast to  "look for breast disease. The breast is pressed between two plates to flatten and spread the tissue. An X-ray is taken of the breast from different angles.  Who should I call with questions: If you have any questions, please call the Imaging Department where you will have your exam. Directions, parking instructions, and other information is available on our website, [email or web address].  Other information about my exam Three-dimensional (3D) mammograms are available at [facility] locations in [facility], [facility], [place], [facility] and [place].  Health locations include [place] and the [facility] in [place].  Benefits of 3D mammograms include * Improved rate of cancer detection * Decreases your chance of having to go back for more tests, which means fewer: * \"False-positive\" results (This means that there is an abnormal area but it isn't cancer.) * Invasive testing procedures, such as a biopsy or surgery * Can provide clearer images of the breast if you have dense breast tissue.  *3D mammography is an optional exam that anyone can have with a 2D mammogram. It doesn't replace or take the place of a 2D mammogram. 2D mammograms remain an effective screening test for all women.  Not all insurance companies cover the cost of a 3D mammogram. Check with your insurance. Three-dimensional (3D) mammograms are available at [facility] locations in [facility], [facility], [facility], and [place]. Health locations include [facility] and [facility] in [place]. Benefits of 3D mammograms include: - Improved rate of cancer detection - Decreases your chance of having to go back for more tests, which means fewer: - \"False-positive\" results (This means that there is an abnormal area but it isn't cancer.) - Invasive testing procedures, such as a biopsy or surgery - Can provide clearer images of the breast if you have " dense breast tissue. 3D mammography is an optional exam that anyone can have with a 2D mammogram. It doesn't replace or take the place of a 2D mammogram. 2D mammograms remain an effective screening test for all women.  Not all insurance companies cover the cost of a 3D mammogram. Check with your insurance.            Oct 04, 2018  1:40 PM CDT   PHYSICAL with Gala Stringer PA-C   [facility] ([facility])    [address] [address]   [phone]            Oct 26, 2018  9:30 AM CDT   MR MYOCARDIUM W CONTRAST with UUMR4, UU CV MR NURSE   [facility] [place], MRI ([facility])    [address] [facility] [address] [phone]           How do I prepare for my exam? (Food and drink instructions) **If you will be receiving sedation or general anesthesia, please see special notes below.**  How do I prepare for my exam? (Other instructions) Take your medicines as usual, unless your doctor tells you not to. You may or may not receive intravenous (IV) contrast for this exam pending the discretion of the Radiologist.  You do not need to do anything special to prepare.  **If you will be receiving sedation or general anesthesia, please see special notes below.**  What should I wear: The MRI machine uses a strong magnet. Please wear clothes without metal (snaps, zippers). A sweatsuit works well, or we may give you a hospital gown. Please remove any body piercings and hair extensions before you arrive. You will also remove watches, jewelry, hairpins, wallets, dentures, partial dental plates and hearing aids. You may wear contact lenses, and you may be able to wear your rings. We have a safe place to keep your personal items, but it is safer to leave them at home.  How long does the exam take: Most tests take 30 to 60 minutes.  HOWEVER, IF YOUR DOCTOR PRESCRIBES ANESTHESIA please plan on spending four to five hours in the  recovery room.  What should I bring:  Bring a list of your current medicines to your exam (including vitamins, minerals and over-the-counter drugs).  Do I need a :  **If you will be receiving sedation or general anesthesia, please see special notes below.**  What should I do after the exam: No Restrictions, You may resume normal activities.  What is this test: MRI (magnetic resonance imaging) uses a strong magnet and radio waves to look inside the body. An MRA (magnetic resonance angiogram) does the same thing, but it lets us look at your blood vessels. A computer turns the radio waves into pictures showing cross sections of the body, much like slices of bread. This helps us see any problems more clearly. You may receive fluid (called  contrast ) before or during your scan. The fluid helps us see the pictures better. We give the fluid through an IV (small needle in your arm).  Who should I call with questions:  Please call the Imaging Department at your exam site with any questions. Directions, parking instructions, and other information is available on our website, [email or web address].  How do I prepare if I m having sedation or anesthesia? **IMPORTANT** THE INSTRUCTIONS BELOW ARE ONLY FOR THOSE PATIENTS WHO HAVE BEEN TOLD THEY WILL RECEIVE SEDATION OR GENERAL ANESTHESIA DURING THEIR MRI PROCEDURE:  IF YOU WILL RECEIVE SEDATION (take medicine to help you relax during your exam): You must get the medicine from your doctor before you arrive. Bring the medicine to the exam. Do not take it at home. Arrive one hour early. Bring someone who can take you home after the test. Your medicine will make you sleepy. After the exam, you may not drive, take a bus or take a taxi by yourself. No eating 8 hours before your exam. You may have clear liquids up until 4 hours before your exam. (Clear liquids include water, clear tea, black coffee and fruit juice without pulp.)  IF YOU WILL RECEIVE ANESTHESIA (be asleep for your  exam): Arrive 1 1/2 hours early. Bring someone who can take you home after the test. You may not drive, take a bus or take a taxi by yourself. No eating 8 hours before your exam. You may have clear liquids up until 4 hours before your exam. (Clear liquids include water, clear tea, black coffee and fruit juice without pulp.)            Nov 01, 2018  3:00 PM CDT   (Arrive by 2:45 PM)   Return Visit with Nayeli Pritchett MD   [facility] ([facility])    [address] [place] [address]  [address]  [facility] [address] [phone]            Feb 22, 2019 10:00 AM CST   (Arrive by 9:45 AM)   RETURN CONGENITAL HEART with Juan Eaton MD   [facility] ([facility])    [address] [place] [address]  [address]  [facility] [address] [phone]              Future tests that were ordered for you today     Open Future Orders        Priority Expected Expires Ordered    Comprehensive metabolic panel Routine 3/26/2019 6/26/2019 9/26/2018    CBC with platelets differential Routine 3/26/2019 6/26/2019 9/26/2018    CT Chest abdomen pelvis w & w/o contrast Routine 3/26/2019 6/26/2019 9/26/2018    MA Screening Digital Bilateral Routine  9/25/2019 9/25/2018            Who to contact     If you have questions or need follow up information about today's clinic visit or your schedule please contact [facility] directly at [phone].  Normal or non-critical lab and imaging results will be communicated to you by MyChart, letter or phone within 4 business days after the clinic has received the results. If you do not hear from us within 7 days, please contact the clinic through [employer] or phone. If you have a critical or abnormal lab result, we will notify you by phone as soon as possible.  Submit refill requests through [employer] or call your pharmacy and they will forward the refill request to us. Please allow 3 business days for your refill  "to be completed.          Additional Information About Your Visit        [employer] Information     [employer] gives you secure access to your electronic health record. If you see a primary care provider, you can also send messages to your care team and make appointments. If you have questions, please call your primary care clinic.  If you do not have a primary care provider, please call [phone] and they will assist you.        Care EveryWhere ID     This is your Care EveryWhere ID. This could be used by other organizations to access your [facility] medical records  [id number]        Your Vitals Were     Pulse Temperature Height Pulse Oximetry BMI (Body Mass Index)       49 97.9  F (36.6  C) (Oral) 1.473 m (4' 10\") 99% 25.69 kg/m2        Blood Pressure from Last 3 Encounters:   09/26/18 136/84   08/15/18 123/80   08/03/18 124/64    Weight from Last 3 Encounters:   09/26/18 55.7 kg (122 lb 14.4 oz)   08/15/18 53.8 kg (118 lb 8 oz)   08/03/18 53.4 kg (117 lb 11.2 oz)              We Performed the Following     CBC with platelets differential     Comprehensive metabolic panel     IgG          Today's Medication Changes          These changes are accurate as of 9/26/18  2:27 PM.  If you have any questions, ask your nurse or doctor.               Start taking these medicines.        Dose/Directions    gabapentin 8 % Gel topical PLO cream   Used for:  Neuropathy   Started by:  Lenore Rodriguez MD        Apply thin layer to area of neuropathy   Quantity:  100 g   Refills:  3         These medicines have changed or have updated prescriptions.        Dose/Directions    calcium carbonate 600 mg-vitamin D 400 units 600-400 MG-UNIT per tablet   Commonly known as:  CALTRATE   This may have changed:  how much to take   Used for:  Diffuse large B-cell lymphoma, unspecified body region (H)        Dose:  2 tablet   Take 2 tablets by mouth every morning   Quantity:  60 tablet   Refills:  0            Where to get your medicines    "   Some of these will need a paper prescription and others can be bought over the counter.  Ask your nurse if you have questions.     Bring a paper prescription for each of these medications     gabapentin 8 % Gel topical PLO cream                Recent Review Flowsheet Data     BMT Recent Results Latest Ref Rng & Units 4/5/2018 4/12/2018 4/24/2018 5/17/2018 5/22/2018 5/24/2018 9/26/2018    WBC 4.0 - 11.0 10e9/L - - - 2.4(L) - 3.2(L) 3.5(L)    Hemoglobin 11.7 - 15.7 g/dL - - - 15.7 15.1 15.0 14.2    Platelet Count 150 - 450 10e9/L - - - 122(L) - 124(L) 130(L)    Platelets 150 - 450 10:9/L - - - - - - -    Neutrophils (Absolute) 1.6 - 8.3 10e9/L - - - 1.3(L) - 2.0 2.4    INR 0.86 - 1.14 - - - - - - -    Sodium 133 - 144 mmol/L 135 - 135 135 - 135 -    Potassium 3.4 - 5.3 mmol/L 4.2 3.7 4.4 3.9 4.1 3.7 -    Chloride 94 - 109 mmol/L 101 - 102 101 - 104 -    Glucose 70 - 99 mg/dL 108(H) - 165(H) 107(H) 128(H) 97 -    Urea Nitrogen 7 - 30 mg/dL 24 - 26 20 - 18 -    Creatinine 0.52 - 1.04 mg/dL 0.90 - 0.80 0.81 - 0.92 0.8    Calcium (Total) 8.5 - 10.1 mg/dL 9.3 - 9.3 9.8 - 8.2(L) -    Protein (Total) 6.8 - 8.8 g/dL - - - 7.9 - 6.2(L) -    Albumin 3.4 - 5.0 g/dL - - - 4.4 - 3.4 -    Bilirubin (Direct) 0.0 - 0.2 mg/dL - - - - - - -    Alkaline Phosphatase 40 - 150 U/L - - - 204(H) - 139 -    AST 0 - 45 U/L - - - 43 - 33 -    ALT 0 - 50 U/L - - - 46 - 38 -    MCV 78 - 100 fl - - - 101(H) - 103(H) 108(H)               Primary Care Provider Office Phone # Fax #    Gala Stringer PA-C [phone] [address]  [place] [address]        Equal Access to Services     [facility]: Hadii laurita Flores, waaxda luqtania, qaybta kaalmada adecindy, durga hermosillo . [facility] [phone].    ATENCIÓN: Si habla español, tiene a sarmiento disposición servicios gratuitos de asistencia lingüística. Taylor al [phone].    We comply with applicable federal civil rights laws and  [place] laws. We do not discriminate on the basis of race, color, national origin, age, disability, sex, sexual orientation, or gender identity.            Thank you!     Thank you for choosing [facility]  for your care. Our goal is always to provide you with excellent care. Hearing back from our patients is one way we can continue to improve our services. Please take a few minutes to complete the written survey that you may receive in the mail after your visit with us. Thank you!             Your Updated Medication List - Protect others around you: Learn how to safely use, store and throw away your medicines at www.disposemymeds.org.          This list is accurate as of 9/26/18  2:27 PM.  Always use your most recent med list.                   Brand Name Dispense Instructions for use Diagnosis    acetaminophen 325 MG tablet    TYLENOL     Take 2 tablets (650 mg) by mouth every 4 hours as needed for mild pain, fever or headaches    Diffuse large B-cell lymphoma of extranodal site (H)       apixaban ANTICOAGULANT 5 MG tablet    ELIQUIS    180 tablet    Take 1 tablet (5 mg) by mouth 2 times daily    Paroxysmal atrial fibrillation (H)       ascorbic acid 500 MG Tabs     30 tablet    Take 1 tablet (500 mg) by mouth daily    Diffuse large B-cell lymphoma, unspecified body region (H)       atenolol 25 MG tablet    TENORMIN    180 tablet    Take 1 tablet (25 mg) by mouth 2 times daily    Atrial tachycardia (H)       calcium carbonate 600 mg-vitamin D 400 units 600-400 MG-UNIT per tablet    CALTRATE    60 tablet    Take 2 tablets by mouth every morning    Diffuse large B-cell lymphoma, unspecified body region (H)       calcium polycarbophil 625 MG tablet    FIBERCON     Take 1 tablet by mouth 2 times daily        digoxin 125 MCG tablet    LANOXIN    90 tablet    Take 250mcg daily for 2 days, then decrease to 125mcg daily    Atrial flutter, unspecified type (H)       furosemide 20 MG tablet     LASIX    90 tablet    Take 1 tablet (20 mg) by mouth daily    Chronic systolic heart failure (H)       gabapentin 100 MG capsule    NEURONTIN    180 capsule    TAKE TWO CAPSULES BY MOUTH THREE TIMES A DAY    Critical illness myopathy       gabapentin 8 % Gel topical PLO cream     100 g    Apply thin layer to area of neuropathy    Neuropathy       HYDROCORTISONE PO      Take 100 mg by mouth IV        hydroxychloroquine 200 MG tablet    PLAQUENIL    60 tablet    Take 1 tablet (200 mg) by mouth 2 times daily    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 (H)       multivitamin, therapeutic with minerals Tabs tablet     30 each    Take 1 tablet by mouth daily    Diffuse large B-cell lymphoma, unspecified body region (H)       order for DME     1 Device    Equipment being ordered: BP cuff    Hypertension, unspecified type       order for DME     1 Units    Equipment being ordered: Thigh high compression stockings Class 2: off the shelf or custom, farrow foot wrap and juxta fit premium lower leg wrap, lymphedema bandaging supplies 3 sets    Lymphedema of both lower extremities       predniSONE 5 MG tablet    DELTASONE    90 tablet    Take 1 tablet (5 mg) by mouth daily    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 (H)       spironolactone 25 MG tablet    ALDACTONE    90 tablet    Take 1 tablet (25 mg) by mouth daily    Acute on chronic diastolic heart failure (H)       tolterodine 4 MG 24 hr capsule    DETROL LA    30 capsule    Take 1 capsule (4 mg) by mouth daily    Urinary urgency, Urge incontinence

## 2018-09-26 NOTE — NURSING NOTE
"Oncology Rooming Note    September 26, 2018 1:33 PM   Amelia Michel is a 57 year old female who presents for:    Chief Complaint   Patient presents with     Blood Draw     Pt is here for lab draw for provider visit     Oncology Clinic Visit     Return:DLBCL (diffuse large B cell lymphoma)      Initial Vitals: /84 (BP Location: Left arm, Patient Position: Left side, Cuff Size: Adult Regular)  Pulse (!) 49  Temp 97.9  F (36.6  C) (Oral)  Ht 1.473 m (4' 10\")  Wt 55.7 kg (122 lb 14.4 oz)  SpO2 99%  BMI 25.69 kg/m2 Estimated body mass index is 25.69 kg/(m^2) as calculated from the following:    Height as of this encounter: 1.473 m (4' 10\").    Weight as of this encounter: 55.7 kg (122 lb 14.4 oz). Body surface area is 1.51 meters squared.  Data Unavailable Comment: Data Unavailable   No LMP recorded. Patient is postmenopausal.  Allergies reviewed: Yes  Medications reviewed: Yes    Medications: Medication refills not needed today.  Pharmacy name entered into [employer]:    [facility] [place], [place] - [address] [facility] [address]  [facility] [place], [place] - [address] [facility] [phone]    Clinical concerns: Pt complains of worsening neuropathy.  Dr. Rodriguez was notified.    5 minutes for nursing intake (face to face time)     Emma Napoles CMA              "

## 2018-09-26 NOTE — DISCHARGE INSTRUCTIONS

## 2018-09-26 NOTE — NURSING NOTE
Chief Complaint   Patient presents with     Blood Draw     Pt is here for lab draw for provider visit     Priscilaradha Rojas MA

## 2018-09-27 LAB — IGG SERPL-MCNC: 792 MG/DL (ref 695–1620)

## 2018-10-03 ENCOUNTER — RECORDS - HEALTHEAST (OUTPATIENT)
Dept: ADMINISTRATIVE | Facility: OTHER | Age: 58
End: 2018-10-03

## 2018-10-03 ENCOUNTER — HOSPITAL ENCOUNTER (OUTPATIENT)
Dept: MAMMOGRAPHY | Facility: CLINIC | Age: 58
Discharge: HOME | End: 2018-10-03
Attending: PHYSICIAN ASSISTANT | Admitting: PHYSICIAN ASSISTANT
Payer: COMMERCIAL

## 2018-10-03 DIAGNOSIS — Z12.31 VISIT FOR SCREENING MAMMOGRAM: ICD-10-CM

## 2018-10-03 PROCEDURE — 77067 SCR MAMMO BI INCL CAD: CPT

## 2018-10-04 ENCOUNTER — OFFICE VISIT (OUTPATIENT)
Dept: FAMILY MEDICINE | Facility: CLINIC | Age: 58
End: 2018-10-04
Payer: COMMERCIAL

## 2018-10-04 VITALS
SYSTOLIC BLOOD PRESSURE: 132 MMHG | DIASTOLIC BLOOD PRESSURE: 74 MMHG | TEMPERATURE: 97.4 F | OXYGEN SATURATION: 98 % | BODY MASS INDEX: 25.57 KG/M2 | HEIGHT: 58 IN | HEART RATE: 55 BPM | WEIGHT: 121.8 LBS

## 2018-10-04 DIAGNOSIS — M05.711 RHEUMATOID ARTHRITIS INVOLVING BOTH SHOULDERS WITH POSITIVE RHEUMATOID FACTOR (H): ICD-10-CM

## 2018-10-04 DIAGNOSIS — D84.9 IMMUNOCOMPROMISED STATE (H): ICD-10-CM

## 2018-10-04 DIAGNOSIS — I27.0 PRIMARY PULMONARY HYPERTENSION (H): ICD-10-CM

## 2018-10-04 DIAGNOSIS — Z00.01 ENCOUNTER FOR ROUTINE ADULT HEALTH EXAMINATION WITH ABNORMAL FINDINGS: Primary | ICD-10-CM

## 2018-10-04 DIAGNOSIS — R73.09 ELEVATED GLUCOSE: ICD-10-CM

## 2018-10-04 DIAGNOSIS — Z23 NEED FOR PROPHYLACTIC VACCINATION AND INOCULATION AGAINST INFLUENZA: ICD-10-CM

## 2018-10-04 DIAGNOSIS — M05.712 RHEUMATOID ARTHRITIS INVOLVING BOTH SHOULDERS WITH POSITIVE RHEUMATOID FACTOR (H): ICD-10-CM

## 2018-10-04 DIAGNOSIS — J96.01 ACUTE RESPIRATORY FAILURE WITH HYPOXIA (H): ICD-10-CM

## 2018-10-04 DIAGNOSIS — M81.8 STEROID-INDUCED OSTEOPOROSIS: ICD-10-CM

## 2018-10-04 DIAGNOSIS — Z12.4 SCREENING FOR MALIGNANT NEOPLASM OF CERVIX: ICD-10-CM

## 2018-10-04 DIAGNOSIS — T38.0X5A STEROID-INDUCED OSTEOPOROSIS: ICD-10-CM

## 2018-10-04 LAB — HBA1C MFR BLD: 5.3 % (ref 0–5.6)

## 2018-10-04 PROCEDURE — 90686 IIV4 VACC NO PRSV 0.5 ML IM: CPT | Performed by: PHYSICIAN ASSISTANT

## 2018-10-04 PROCEDURE — 99396 PREV VISIT EST AGE 40-64: CPT | Mod: 25 | Performed by: PHYSICIAN ASSISTANT

## 2018-10-04 PROCEDURE — 90471 IMMUNIZATION ADMIN: CPT | Performed by: PHYSICIAN ASSISTANT

## 2018-10-04 PROCEDURE — 36415 COLL VENOUS BLD VENIPUNCTURE: CPT | Performed by: INTERNAL MEDICINE

## 2018-10-04 PROCEDURE — 82306 VITAMIN D 25 HYDROXY: CPT | Performed by: INTERNAL MEDICINE

## 2018-10-04 PROCEDURE — G0145 SCR C/V CYTO,THINLAYER,RESCR: HCPCS | Performed by: PHYSICIAN ASSISTANT

## 2018-10-04 PROCEDURE — 80162 ASSAY OF DIGOXIN TOTAL: CPT | Performed by: INTERNAL MEDICINE

## 2018-10-04 PROCEDURE — 83036 HEMOGLOBIN GLYCOSYLATED A1C: CPT | Performed by: INTERNAL MEDICINE

## 2018-10-04 PROCEDURE — 87624 HPV HI-RISK TYP POOLED RSLT: CPT | Performed by: PHYSICIAN ASSISTANT

## 2018-10-04 NOTE — LETTER
[facility]  [address] [address] [phone]          October 5, 2018    Amelia Michel                                                                                                                     [address] [address]            Dear Amelia,    The HbA1C (3 month average of blood sugars) is normal :) it is much improved from last year and does not show prediabetes/diabetes.     Results for orders placed or performed in visit on 10/04/18   Digoxin level   Result Value Ref Range    Digoxin Level 0.6 0.5 - 2.0 ug/L   Hemoglobin A1c   Result Value Ref Range    Hemoglobin A1C 5.3 0 - 5.6 %             Sincerely,         Gala Stringer PNP

## 2018-10-04 NOTE — MR AVS SNAPSHOT
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   o Discuss preventive care.    o Review your medicines if your doctor has prescribed any.      Get a Pap test every three years (unless you have an abnormal result and your provider advises testing more often).    If you get Pap tests with HPV test, you only need to test every 5 years, unless you have an abnormal result.     You do not need a Pap test if your uterus was removed (hysterectomy) and you have not had cancer.    You should be tested each year for STDs (sexually transmitted diseases) if you're at risk.     Have a mammogram every 1 to 2 years.    Have a colonoscopy at age 50, or have a yearly FIT test (stool test). These exams screen for colon cancer.      Have a cholesterol test every 5 years, or more often if advised.    Have a diabetes test (fasting glucose) every three years. If you are at risk for diabetes, you should have this test more often.     If you are at risk for osteoporosis (brittle bone disease), think about having a bone density scan (DEXA).    Shots: Get a flu shot each year. Get a tetanus shot every 10 years.    Nutrition:     Eat at least 5 servings of fruits and vegetables each day.    Eat  whole-grain bread, whole-wheat pasta and brown rice instead of white grains and rice.    Get adequate Calcium and Vitamin D.     Lifestyle    Exercise at least 150 minutes a week (30 minutes a day, 5 days a week). This will help you control your weight and prevent disease.    Limit alcohol to one drink per day.    No smoking.     Wear sunscreen to prevent skin cancer.     See your dentist every six months for an exam and cleaning.    See your eye doctor every 1 to 2 years.            Follow-ups after your visit        Follow-up notes from your care team     Return in about 6 months (around 4/4/2019).      Your next 10 appointments already scheduled     Oct 26, 2018  9:30 AM CDT   MR MYOCARDIUM W CONTRAST with UUMR4, UU CV MR NURSE   [facility], [place], MRI ([facility])    [address] [address] [phone]           How do I prepare for my exam? (Food and drink instructions) **If you will be receiving sedation or general anesthesia, please see special notes below.**  How do I prepare for my exam? (Other instructions) Take your medicines as usual, unless your doctor tells you not to. You may or may not receive intravenous (IV) contrast for this exam pending the discretion of the Radiologist.  You do not need to do anything special to prepare.  **If you will be receiving sedation or general anesthesia, please see special notes below.**  What should I wear: The MRI machine uses a strong magnet. Please wear clothes without metal (snaps, zippers). A sweatsuit works well, or we may give you a hospital gown. Please remove any body piercings and hair extensions before you arrive. You will also remove watches, jewelry, hairpins, wallets, dentures, partial dental plates and hearing aids. You may wear contact lenses, and you may be able to wear your rings. We have a safe place to keep your personal items, but it is safer to leave them at home.  How  long does the exam take: Most tests take 30 to 60 minutes.  HOWEVER, IF YOUR DOCTOR PRESCRIBES ANESTHESIA please plan on spending four to five hours in the recovery room.  What should I bring:  Bring a list of your current medicines to your exam (including vitamins, minerals and over-the-counter drugs).  Do I need a :  **If you will be receiving sedation or general anesthesia, please see special notes below.**  What should I do after the exam: No Restrictions, You may resume normal activities.  What is this test: MRI (magnetic resonance imaging) uses a strong magnet and radio waves to look inside the body. An MRA (magnetic resonance angiogram) does the same thing, but it lets us look at your blood vessels. A computer turns the radio waves into pictures showing cross sections of the body, much like slices of bread. This helps us see any problems more clearly. You may receive fluid (called  contrast ) before or during your scan. The fluid helps us see the pictures better. We give the fluid through an IV (small needle in your arm).  Who should I call with questions:  Please call the Imaging Department at your exam site with any questions. Directions, parking instructions, and other information is available on our website, [email or web address].  How do I prepare if I m having sedation or anesthesia? **IMPORTANT** THE INSTRUCTIONS BELOW ARE ONLY FOR THOSE PATIENTS WHO HAVE BEEN TOLD THEY WILL RECEIVE SEDATION OR GENERAL ANESTHESIA DURING THEIR MRI PROCEDURE:  IF YOU WILL RECEIVE SEDATION (take medicine to help you relax during your exam): You must get the medicine from your doctor before you arrive. Bring the medicine to the exam. Do not take it at home. Arrive one hour early. Bring someone who can take you home after the test. Your medicine will make you sleepy. After the exam, you may not drive, take a bus or take a taxi by yourself. No eating 8 hours before your exam. You may have clear liquids up until 4 hours  before your exam. (Clear liquids include water, clear tea, black coffee and fruit juice without pulp.)  IF YOU WILL RECEIVE ANESTHESIA (be asleep for your exam): Arrive 1 1/2 hours early. Bring someone who can take you home after the test. You may not drive, take a bus or take a taxi by yourself. No eating 8 hours before your exam. You may have clear liquids up until 4 hours before your exam. (Clear liquids include water, clear tea, black coffee and fruit juice without pulp.)            Nov 01, 2018  3:00 PM CDT   (Arrive by 2:45 PM)   Return Visit with Nayeli Pritchett MD   [facility] Rheumatology ([facility])    [address]  [address]  [place] [address] [phone]            Feb 22, 2019 10:00 AM CST   (Arrive by 9:45 AM)   RETURN CONGENITAL HEART with Juan Eaton MD   [facility] Heart Care ([facility])    [address]  [address]  [place] [address] [phone]            Mar 27, 2019 11:00 AM CDT   CT CHEST ABDOMEN PELVIS W/O & W CONTRAST with UCCT2   [facility] CT ([facility])    [address]  [address]  [place] [address] [phone]           How do I prepare for my exam? (Food and drink instructions) To prepare: Do not eat or drink for 2 hours before your exam. If you need to take medicine, you may take it with small sips of water. (We may ask you to take liquid medicine as well.)  How do I prepare for my exam? (Other instructions) Please arrive 30 minutes early for your CT.  Once in the department you might be asked to drink water 15-20 minutes prior to your exam.  If indicated you may be asked to drink an oral contrast in advance of your CT.  If this is the case, the imaging team will let you know or be in contact with you prior to your appointment  Patients over 70 or patients with diabetes or kidney problems: If you haven t had a blood test (creatinine  test) within the last 30 days, the Cardiologist/Radiologist may require you to get this test prior to your exam.  If you have diabetes:  Continue to take your metformin medication on the day of your exam  What should I wear: Please wear loose clothing, such as a sweat suit or jogging clothes. Avoid snaps, zippers and other metal. We may ask you to undress and put on a hospital gown.  How long does the exam take: Most scans take less than 20 minutes.  What should I bring: Please bring any scans or X-rays taken at other hospitals, if similar tests were done. Also bring a list of your medicines, including vitamins, minerals and over-the-counter drugs. It is safest to leave personal items at home.  Do I need a : No  is needed.  What do I need to tell my doctor? Be sure to tell your doctor: * If you have any allergies. * If there s any chance you are pregnant. * If you are breastfeeding.  What should I do after the exam: No restrictions, You may resume normal activities.  What is this test: A CT (computed tomography) scan is a series of pictures that allows us to look inside your body. The scanner creates images of the body in cross sections, much like slices of bread. This helps us see any problems more clearly. You may receive contrast (X-ray dye) before or during your scan. You will be asked to drink the contrast.  Who should I call with questions: If you have any questions, please call the Imaging Department where you will have your exam. Directions, parking instructions, and other information is available on our website, [email or web address].            Mar 27, 2019  1:00 PM CDT   [facility] Lab Draw with  MAS[facility] LAB DRAW   [facility] Masonic Lab Draw ([facility] [place])    [address]  [address]  [place] [id number] [phone]            Mar 27, 2019  1:45 PM CDT   RETURN ONC with Lenore Rodriguez MD   [facility] Blood and Marrow Transplant ([facility] and  "[facility] Center)    [address]  [address]  [place] [address] [phone]              Who to contact     Normal or non-critical lab and imaging results will be communicated to you by [email or web address], letter or phone within 4 business days after the clinic has received the results. If you do not hear from us within 7 days, please contact the clinic through [facility] or phone. If you have a critical or abnormal lab result, we will notify you by phone as soon as possible.  Submit refill requests through [employer] or call your pharmacy and they will forward the refill request to us. Please allow 3 business days for your refill to be completed.          If you need to speak with a  for additional information , please call: [phone]             Additional Information About Your Visit        [employer] Information     [employer] gives you secure access to your electronic health record. If you see a primary care provider, you can also send messages to your care team and make appointments. If you have questions, please call your primary care clinic.  If you do not have a primary care provider, please call [phone] and they will assist you.        Care EveryWhere ID     This is your Care EveryWhere ID. This could be used by other organizations to access your [place] medical records  [id number]        Your Vitals Were     Pulse Temperature Height Pulse Oximetry BMI (Body Mass Index)       55 97.4  F (36.3  C) (Tympanic) 4' 10\" (1.473 m) 98% 25.46 kg/m2        Blood Pressure from Last 3 Encounters:   10/04/18 132/74   09/26/18 136/84   08/15/18 123/80    Weight from Last 3 Encounters:   10/04/18 121 lb 12.8 oz (55.2 kg)   09/26/18 122 lb 14.4 oz (55.7 kg)   08/15/18 118 lb 8 oz (53.8 kg)              We Performed the Following     Digoxin level     Hemoglobin A1c     Pap imaged thin layer screen with HPV - recommended age 30 - 65 years (select HPV order below)     Vitamin D Deficiency        "   Today's Medication Changes          These changes are accurate as of 10/4/18  2:41 PM.  If you have any questions, ask your nurse or doctor.               These medicines have changed or have updated prescriptions.        Dose/Directions    calcium carbonate 600 mg-vitamin D 400 units 600-400 MG-UNIT per tablet   Commonly known as:  CALTRATE   This may have changed:  how much to take   Used for:  Diffuse large B-cell lymphoma, unspecified body region (H)        Dose:  2 tablet   Take 2 tablets by mouth every morning   Quantity:  60 tablet   Refills:  0                Primary Care Provider Office Phone # Fax #    Gala Stringer PA-C [phone] [address] [facility] [address]        Equal Access to Services     CATINA HEAD : Kay Flores, khari ibarra, krystle bae, durga inman. So [facility] [phone].    ATENCIÓN: Si habla español, tiene a sarmiento disposición servicios gratuitos de asistencia lingüística. Llame al [phone].    We comply with applicable federal civil rights laws and [place] laws. We do not discriminate on the basis of race, color, national origin, age, disability, sex, sexual orientation, or gender identity.            Thank you!     Thank you for choosing [facility]  for your care. Our goal is always to provide you with excellent care. Hearing back from our patients is one way we can continue to improve our services. Please take a few minutes to complete the written survey that you may receive in the mail after your visit with us. Thank you!             Your Updated Medication List - Protect others around you: Learn how to safely use, store and throw away your medicines at www.disposemymeds.org.          This list is accurate as of 10/4/18  2:41 PM.  Always use your most recent med list.                   Brand Name Dispense Instructions for use Diagnosis    acetaminophen 325 MG tablet    TYLENOL      Take 2 tablets (650 mg) by mouth every 4 hours as needed for mild pain, fever or headaches    Diffuse large B-cell lymphoma of extranodal site (H)       apixaban ANTICOAGULANT 5 MG tablet    ELIQUIS    180 tablet    Take 1 tablet (5 mg) by mouth 2 times daily    Paroxysmal atrial fibrillation (H)       ascorbic acid 500 MG Tabs     30 tablet    Take 1 tablet (500 mg) by mouth daily    Diffuse large B-cell lymphoma, unspecified body region (H)       atenolol 25 MG tablet    TENORMIN    180 tablet    Take 1 tablet (25 mg) by mouth 2 times daily    Atrial tachycardia (H)       calcium carbonate 600 mg-vitamin D 400 units 600-400 MG-UNIT per tablet    CALTRATE    60 tablet    Take 2 tablets by mouth every morning    Diffuse large B-cell lymphoma, unspecified body region (H)       calcium polycarbophil 625 MG tablet    FIBERCON     Take 1 tablet by mouth 2 times daily        digoxin 125 MCG tablet    LANOXIN    90 tablet    Take 250mcg daily for 2 days, then decrease to 125mcg daily    Atrial flutter, unspecified type (H)       furosemide 20 MG tablet    LASIX    90 tablet    Take 1 tablet (20 mg) by mouth daily    Chronic systolic heart failure (H)       gabapentin 100 MG capsule    NEURONTIN    180 capsule    TAKE TWO CAPSULES BY MOUTH THREE TIMES A DAY    Critical illness myopathy       * gabapentin 8 % Gel topical PLO cream     100 g    Apply thin layer to area of neuropathy    Neuropathy       * gabapentin Powd           HYDROCORTISONE PO      Take 100 mg by mouth IV        hydroxychloroquine 200 MG tablet    PLAQUENIL    60 tablet    Take 1 tablet (200 mg) by mouth 2 times daily    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 (H)       multivitamin, therapeutic with minerals Tabs tablet     30 each    Take 1 tablet by mouth daily    Diffuse large B-cell lymphoma, unspecified body region (H)       order for DME     1 Device    Equipment being ordered: BP cuff    Hypertension,  unspecified type       order for DME     1 Units    Equipment being ordered: Thigh high compression stockings Class 2: off the shelf or custom, farrow foot wrap and juxta fit premium lower leg wrap, lymphedema bandaging supplies 3 sets    Lymphedema of both lower extremities       predniSONE 5 MG tablet    DELTASONE    90 tablet    Take 1 tablet (5 mg) by mouth daily    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 (H)       spironolactone 25 MG tablet    ALDACTONE    90 tablet    Take 1 tablet (25 mg) by mouth daily    Acute on chronic diastolic heart failure (H)       tolterodine 4 MG 24 hr capsule    DETROL LA    30 capsule    Take 1 capsule (4 mg) by mouth daily    Urinary urgency, Urge incontinence       * Notice:  This list has 2 medication(s) that are the same as other medications prescribed for you. Read the directions carefully, and ask your doctor or other care provider to review them with you.

## 2018-10-04 NOTE — PROGRESS NOTES
SUBJECTIVE:   CC: Amelia Michel is an 57 year old woman who presents for preventive health visit.     Answers for HPI/ROS submitted by the patient on 10/2/2018   Annual Exam:  Getting at least 3 servings of Calcium per day:: Yes  Bi-annual eye exam:: NO  Dental care twice a year:: NO  Sleep apnea or symptoms of sleep apnea:: None  Diet:: Low salt  Frequency of exercise:: 2-3 days/week  Taking medications regularly:: Yes  Medication side effects:: None  Additional concerns today:: YES  PHQ-2 Score: 0  Duration of exercise:: 15-30 minutes      Patient informed that anything we discuss that is not related to preventative medicine, may be billed for; patient verbalizes understanding.    *  Would like to make aware that left arm has pulsating above scar  *  Needs handicap sticker renewed is wondering if she can get permanent sticker instead of temporary seeing that her neuropathy is not changing  *  Ankle Foot Orthotic brace have a hinge so she has some type of movement in her ankle. She wonders if they have a hinged one instead  *  Blood sugars continue to be elevated, what should she be doing about this  *  Fell recently in September on uneven curb and fell on left knee and just wanted it to be noted   *  Does she still need cardiac MRI       - she has orthotic brace from orthotics for neuropathy. She sprained ankle and didn't know it. Then neuropathy worsened (cancer, cardiac arrest). Predisposed to ankle rolling  - knee is a lot better - had previous open skin, no actual knee pain  CT chest 9/26/18:  Stable cardiomegaly. Improved pericardial effusion.    From 9/26/18 BMT visit:  -- Follow-up scans now show ongoing CR. Plan for repeat CT scan in 6 months again.     2. ID:  Not on any abx at this point      3. Rheumatoid arthritis: is on prednisone 5 mg daily and plaquenil. Stable     4. CV: A. Fib. On digoxin and atenolol. Status post numerous ablations as above but best control seems to correlate with the  digoxin. Currently in normal rate and rhythm  -- Pericardial effusion noted during hospitalization in 3/2018 and pericarditis on 3/8/2018 MRI. Repeat MRI 4/12/2018 showed ongoing pericardial effusion with the start of organization felt to be ongoing inflammation. CT Chest today shows ongoing improvement     5. Heme: Counts good today.     5. Wound: status post surgery on 5/22/18 and continues to improve     7. Neuropathy: on gabapentin. Stable. Trial of gabapentin gel to the foot to see if that helps and she can decrease her oral medication which has an impact on her memory    Today's PHQ-2 Score:   PHQ-2 ( 1999 [employer]) 10/2/2018 5/31/2018   Q1: Little interest or pleasure in doing things 0 0   Q2: Feeling down, depressed or hopeless 0 0   PHQ-2 Score 0 0   Q1: Little interest or pleasure in doing things Not at all -   Q2: Feeling down, depressed or hopeless Not at all -   PHQ-2 Score 0 -       Abuse: Current or Past(Physical, Sexual or Emotional)- No  Do you feel safe in your environment - Yes    Social History   Substance Use Topics     Smoking status: Never Smoker     Smokeless tobacco: Never Used     Alcohol use Yes      Comment: none     If you drink alcohol do you typically have >3 drinks per day or >7 drinks per week? No                     Reviewed orders with patient.  Reviewed health maintenance and updated orders accordingly - Yes  Labs reviewed in EPIC  BP Readings from Last 3 Encounters:   10/04/18 132/74   09/26/18 136/84   08/15/18 123/80    Wt Readings from Last 3 Encounters:   10/04/18 121 lb 12.8 oz (55.2 kg)   09/26/18 122 lb 14.4 oz (55.7 kg)   08/15/18 118 lb 8 oz (53.8 kg)                  Patient Active Problem List   Diagnosis     HTN (hypertension)     Primary pulmonary hypertension (H)     Hyperlipidemia LDL goal <130     RA (rheumatoid arthritis) (H)     Lymphedema of both lower extremities     Atrial tachycardia (H)     Cardiogenic shock (H)     Acute respiratory failure with hypoxia (H)      Hypertension     Critical illness myopathy     Sepsis (H)     Wound of left upper extremity     Diffuse large B-cell lymphoma of extranodal site (H)     Diffuse large B cell lymphoma (H)     Febrile neutropenia (H)     Physical deconditioning     Health Care Home     DLBCL (diffuse large B cell lymphoma) (H)     H/O cardiac arrest     Palpitations     Paroxysmal atrial fibrillation (H)     Atrial flutter, unspecified type (H)     Past Surgical History:   Procedure Laterality Date     ANESTHESIA CARDIOVERSION N/A 5/17/2017    Procedure: ANESTHESIA CARDIOVERSION;  ANESTHESIA CARDIOVERSION;  Surgeon: GENERIC ANESTHESIA PROVIDER;  Location: [facility]     ANESTHESIA CARDIOVERSION  3/12/2018    Procedure: ANESTHESIA CARDIOVERSION;;  Surgeon: GENERIC ANESTHESIA PROVIDER;  Location: [facility]     ANESTHESIA CARDIOVERSION N/A 3/23/2018    Procedure: ANESTHESIA CARDIOVERSION;  Anesthesia Cardioversion ;  Surgeon: GENERIC ANESTHESIA PROVIDER;  Location: [facility]     ANESTHESIA CARDIOVERSION N/A 4/12/2018    Procedure: ANESTHESIA CARDIOVERSION;  Cardioversion;  Surgeon: GENERIC ANESTHESIA PROVIDER;  Location: [facility]     ARTHRODESIS WRIST  2000    Right wrist     BONE MARROW ASPIRATE &BIOPSY  7/12/2017          EXCISE LESION UPPER EXTREMITY Left 5/22/2018    Procedure: EXCISE LESION UPPER EXTREMITY;  Left Arm Wound Excision And Closure, Possible Submuscular Transposition of Ulnar Nerve  (Choice Anesthesia);  Surgeon: Kevin Sheldon MD;  Location: [facility]     FOOT SURGERY      4 left and 2 right     RELEASE CARPAL TUNNEL BILATERAL       REPAIR VENTRICULAR SEPTAL DEFECT  1969     TRANSPOSITION ULNAR NERVE (ELBOW) Left 5/22/2018    Procedure: TRANSPOSITION ULNAR NERVE (ELBOW);;  Surgeon: Kevin Sheldon MD;  Location: [facility]       Social History   Substance Use Topics     Smoking status: Never Smoker     Smokeless tobacco: Never Used     Alcohol use Yes      Comment: none    Family History   Problem Relation Age of Onset     Breast Cancer  Mother      Hypertension Mother      Alzheimer Disease Mother      Hypertension Father      Blood Disease Father      Lymphoa     Circulatory Father      A Fib     Diabetes Paternal Grandmother            Patient over age 50, mutual decision to screen reflected in health maintenance.    Pertinent mammograms are reviewed under the imaging tab.  History of abnormal Pap smear: NO - age 30- 65 PAP every 3 years recommended  PAP / HPV 1/26/2015 2/8/2011   PAP NIL NIL     Reviewed and updated as needed this visit by clinical staff  Tobacco  Allergies  Meds  Problems  Med Hx  Surg Hx  Fam Hx  Soc Hx          Reviewed and updated as needed this visit by Provider  Tobacco  Allergies  Meds  Problems  Med Hx  Surg Hx  Fam Hx  Soc Hx         Past Medical History:   Diagnosis Date     Antiplatelet or antithrombotic long-term use      Arrhythmia      Arthritis      Cancer (H)     lymphoma     Cardiac arrest (H)      History of chemotherapy      Hypertension      Lymphoma (H)      Neuropathy      PONV (postoperative nausea and vomiting)      Rheumatoid arthritis(714.0)       Past Surgical History:   Procedure Laterality Date     ANESTHESIA CARDIOVERSION N/A 5/17/2017    Procedure: ANESTHESIA CARDIOVERSION;  ANESTHESIA CARDIOVERSION;  Surgeon: GENERIC ANESTHESIA PROVIDER;  Location: [facility]     ANESTHESIA CARDIOVERSION  3/12/2018    Procedure: ANESTHESIA CARDIOVERSION;;  Surgeon: GENERIC ANESTHESIA PROVIDER;  [facility]: [facility]     ANESTHESIA CARDIOVERSION N/A 3/23/2018    Procedure: ANESTHESIA CARDIOVERSION;  Anesthesia Cardioversion ;  Surgeon: GENERIC ANESTHESIA PROVIDER;  Location: [facility]     ANESTHESIA CARDIOVERSION N/A 4/12/2018    Procedure: ANESTHESIA CARDIOVERSION;  Cardioversion;  Surgeon: GENERIC ANESTHESIA PROVIDER;  Location: [facility]     ARTHRODESIS WRIST  2000    Right wrist     BONE MARROW ASPIRATE &BIOPSY  7/12/2017          EXCISE LESION UPPER EXTREMITY Left 5/22/2018    Procedure: EXCISE LESION UPPER  "EXTREMITY;  Left Arm Wound Excision And Closure, Possible Submuscular Transposition of Ulnar Nerve  (Choice Anesthesia);  Surgeon: Kevin Sheldon MD;  Location: [facility]     FOOT SURGERY      4 left and 2 right     RELEASE CARPAL TUNNEL BILATERAL       REPAIR VENTRICULAR SEPTAL DEFECT  1969     TRANSPOSITION ULNAR NERVE (ELBOW) Left 5/22/2018    Procedure: TRANSPOSITION ULNAR NERVE (ELBOW);;  Surgeon: Kevin Sheldon MD;  Location: [facility]       ROS:  CONSTITUTIONAL: NEGATIVE for fever, chills, change in weight  INTEGUMENTARY/SKIN: NEGATIVE for worrisome rashes, moles or lesions  EYES: NEGATIVE for vision changes or irritation  ENT: NEGATIVE for ear, mouth and throat problems  RESP: NEGATIVE for significant cough or SOB  BREAST: NEGATIVE for masses, tenderness or discharge  CV: NEGATIVE for chest pain, palpitations or peripheral edema  GI: NEGATIVE for nausea, abdominal pain, heartburn, or change in bowel habits  : NEGATIVE for unusual urinary or vaginal symptoms. No vaginal bleeding.  MUSCULOSKELETAL: NEGATIVE for significant arthralgias or myalgia  NEURO: NEGATIVE for weakness, dizziness or paresthesias  PSYCHIATRIC: NEGATIVE for changes in mood or affect     OBJECTIVE:   /74  Pulse 55  Temp 97.4  F (36.3  C) (Tympanic)  Ht 4' 10\" (1.473 m)  Wt 121 lb 12.8 oz (55.2 kg)  SpO2 98%  BMI 25.46 kg/m2  EXAM:  GENERAL: healthy, alert and no distress  EYES: Eyes grossly normal to inspection, PERRL and conjunctivae and sclerae normal  HENT: ear canals and TM's normal, nose and mouth without ulcers or lesions  NECK: no adenopathy, no asymmetry, masses, or scars and thyroid normal to palpation  RESP: lungs clear to auscultation - no rales, rhonchi or wheezes  BREAST: normal without masses, tenderness or nipple discharge and no palpable axillary masses or adenopathy POSITIVE dense breast tissue  CV: regular rate and rhythm, normal S1 S2, no S3 or S4, holosystolic murmur, not new, click or rub, no peripheral edema and " peripheral pulses strong  ABDOMEN: soft, nontender, no hepatosplenomegaly, no masses and bowel sounds normal   (female): normal female external genitalia, normal urethral meatus, vaginal mucosa, normal cervix/adnexa/uterus without masses or discharge  MS: no gross musculoskeletal defects noted, no edema. Wears bilateral compression stockings.  MS: POSITIVE left inner arm shows healing scars from surgery. Decreased muscle/fat tissue due to removal. Can visualize pulsing brachial vessel which is nonerythematous, nontender, not firm. Feels normal, suspect can just see it because of the removal of tissue  SKIN: no suspicious lesions or rashes  NEURO: Normal strength and tone, mentation intact and speech normal  BACK: no CVA tenderness, no paralumbar tenderness  PSYCH: mentation appears normal, affect normal/bright  LYMPH: no cervical, supraclavicular, axillary, or inguinal adenopathy    ASSESSMENT/PLAN:       ICD-10-CM    1. Encounter for routine adult health examination with abnormal findings Z00.01    2. Immunocompromised state (H) D84.9    3. Acute respiratory failure with hypoxia (H) J96.01    4. Screening for malignant neoplasm of cervix Z12.4 Pap imaged thin layer screen with HPV - recommended age 30 - 65 years (select HPV order below)   5. Primary pulmonary hypertension (H) I27.0 Digoxin level   6. Elevated glucose R73.09 Hemoglobin A1c   7. Rheumatoid arthritis involving both shoulders with positive rheumatoid factor (H) M05.711 Vitamin D Deficiency    M05.712    8. 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 Need for prophylactic vaccination and inoculation against influenza Z23 FLU VACCINE, SPLIT VIRUS, IM (QUADRIVALENT) [02924]- >3 YRS     Vaccine Administration, Initial [02730]     Patient is overall doing well  She follows up with specialists regularly: oncology, rheumatology, cardiology  Will check labs today  Filled out handicap sticker form for 5 years due to neuropathy -  "see scanned in. Discussed at some point pain clinic referral but she would like to continue trying gabapentin cream for now  I sent message to cardiology asking if she still needs cardiac MRI given recent CT chest showing decreased effusion  She will contact orthotics for replacement    - eye exam scheduled for November  - dental exam scheduled for November    COUNSELING:   Reviewed preventive health counseling, as reflected in patient instructions    BP Readings from Last 1 Encounters:   10/04/18 132/74     Estimated body mass index is 25.46 kg/(m^2) as calculated from the following:    Height as of this encounter: 4' 10\" (1.473 m).    Weight as of this encounter: 121 lb 12.8 oz (55.2 kg).    Weight management plan: Discussed healthy diet and exercise guidelines and patient will follow up in 12 months in clinic to re-evaluate.     reports that she has never smoked. She has never used smokeless tobacco.      Counseling Resources:  ATP IV Guidelines  Pooled Cohorts Equation Calculator  Breast Cancer Risk Calculator  FRAX Risk Assessment  ICSI Preventive Guidelines  Dietary Guidelines for Americans, 2010  USDA's MyPlate  ASA Prophylaxis  Lung CA Screening    Gala Stringer PA-C  [facility]  "

## 2018-10-04 NOTE — PROGRESS NOTES

## 2018-10-05 LAB
DEPRECATED CALCIDIOL+CALCIFEROL SERPL-MC: 48 UG/L (ref 20–75)
DIGOXIN SERPL-MCNC: 0.6 UG/L (ref 0.5–2)

## 2018-10-09 DIAGNOSIS — M05.742 RHEUMATOID ARTHRITIS INVOLVING BOTH HANDS WITH POSITIVE RHEUMATOID FACTOR (H): ICD-10-CM

## 2018-10-09 DIAGNOSIS — M05.741 RHEUMATOID ARTHRITIS INVOLVING BOTH HANDS WITH POSITIVE RHEUMATOID FACTOR (H): ICD-10-CM

## 2018-10-09 LAB
COPATH REPORT: NORMAL
PAP: NORMAL

## 2018-10-09 NOTE — TELEPHONE ENCOUNTER
Plaquenil      Last Written Prescription Date:  3/30/18  Last Fill Quantity: 60  # refills: 5  Last Office Visit: 5/31/18  Future Office visit: 11/1/18  Last Eye Exam not found    Routing refill request to provider for review/approval because:  Eye exam needed, letter sent

## 2018-10-10 RX ORDER — HYDROXYCHLOROQUINE SULFATE 200 MG/1
200 TABLET, FILM COATED ORAL 2 TIMES DAILY
Qty: 180 TABLET | Refills: 0 | Status: SHIPPED | OUTPATIENT
Start: 2018-10-10 | End: 2019-01-07

## 2018-10-12 LAB
FINAL DIAGNOSIS: NORMAL
HPV HR 12 DNA CVX QL NAA+PROBE: NEGATIVE
HPV16 DNA SPEC QL NAA+PROBE: NEGATIVE
HPV18 DNA SPEC QL NAA+PROBE: NEGATIVE
SPECIMEN DESCRIPTION: NORMAL
SPECIMEN SOURCE CVX/VAG CYTO: NORMAL

## 2018-10-18 PROBLEM — Z12.4 CERVICAL CANCER SCREENING: Status: ACTIVE | Noted: 2018-10-18

## 2018-10-22 ENCOUNTER — TRANSFERRED RECORDS (OUTPATIENT)
Dept: HEALTH INFORMATION MANAGEMENT | Facility: CLINIC | Age: 58
End: 2018-10-22

## 2018-10-23 ENCOUNTER — TELEPHONE (OUTPATIENT)
Dept: CARDIOLOGY | Facility: CLINIC | Age: 58
End: 2018-10-23

## 2018-10-23 NOTE — TELEPHONE ENCOUNTER
[facility]    Phone Message    May a detailed message be left on voicemail: yes    Reason for Call: Other: Pt calling recently had Cat scan with PCP and showed improvment and was told that the MRI scheduled for Friday is not neccessary. Pt wanted to confirm that was correct with Dr. Eaton team. Please return call so pt can cancel.     Action Taken: Message routed to:  [facility]

## 2018-10-31 ENCOUNTER — PATIENT OUTREACH (OUTPATIENT)
Dept: CARE COORDINATION | Facility: CLINIC | Age: 58
End: 2018-10-31

## 2018-11-01 ENCOUNTER — OFFICE VISIT (OUTPATIENT)
Dept: RHEUMATOLOGY | Facility: CLINIC | Age: 58
End: 2018-11-01
Attending: INTERNAL MEDICINE
Payer: COMMERCIAL

## 2018-11-01 VITALS
SYSTOLIC BLOOD PRESSURE: 119 MMHG | DIASTOLIC BLOOD PRESSURE: 78 MMHG | TEMPERATURE: 98.2 F | OXYGEN SATURATION: 95 % | HEIGHT: 58 IN | BODY MASS INDEX: 25.54 KG/M2 | WEIGHT: 121.7 LBS | HEART RATE: 98 BPM

## 2018-11-01 DIAGNOSIS — G72.81 CRITICAL ILLNESS MYOPATHY: ICD-10-CM

## 2018-11-01 DIAGNOSIS — M05.79 RHEUMATOID ARTHRITIS INVOLVING MULTIPLE SITES WITH POSITIVE RHEUMATOID FACTOR (H): Primary | ICD-10-CM

## 2018-11-01 PROCEDURE — G0463 HOSPITAL OUTPT CLINIC VISIT: HCPCS | Mod: ZF

## 2018-11-01 RX ORDER — GABAPENTIN 100 MG/1
200 CAPSULE ORAL 3 TIMES DAILY
Qty: 180 CAPSULE | Refills: 3 | Status: SHIPPED | OUTPATIENT
Start: 2018-11-01 | End: 2018-11-01

## 2018-11-01 RX ORDER — GABAPENTIN 100 MG/1
200 CAPSULE ORAL 3 TIMES DAILY
Qty: 180 CAPSULE | Refills: 3 | Status: SHIPPED | OUTPATIENT
Start: 2018-11-01 | End: 2019-02-28

## 2018-11-01 RX ORDER — PREDNISONE 5 MG/1
TABLET ORAL
Qty: 90 TABLET | Refills: 0 | Status: SHIPPED | OUTPATIENT
Start: 2018-11-01 | End: 2019-01-07

## 2018-11-01 ASSESSMENT — PAIN SCALES - GENERAL: PAINLEVEL: MODERATE PAIN (4)

## 2018-11-01 NOTE — MR AVS SNAPSHOT
After Visit Summary   11/1/2018    Amelia Michel    MRN: [id number]           Patient Information     Date Of Birth          1960        Visit Information        Provider Department      11/1/2018 3:00 PM Nayeli Pritchett MD [facility]        Today's Diagnoses     Rheumatoid arthritis involving multiple sites with positive rheumatoid factor (H)    -  1    Critical illness myopathy          Care Instructions    - increase prednisone to 15 mg for 1 week, then 10 mg for 1 week, then back to 5 mg/day  - use ice packs as needed for pain  - continue plaquenil  - please schedule a DEXA scan for bone health screening            Follow-ups after your visit        Follow-up notes from your care team     Return in about 3 months (around 2/1/2019).      Your next 10 appointments already scheduled     Feb 22, 2019 10:00 AM CST   (Arrive by 9:45 AM)   RETURN CONGENITAL HEART with Juan Eaton MD   [facility] ([place] [facility])    [address]  [address]  [place] [address] [phone]            Feb 28, 2019  3:30 PM CST   (Arrive by 3:15 PM)   Return Visit with Nayeli Pritchett MD   [facility] ([place] [facility])    [address]  [address]  [place] [id number] [phone]            Mar 26, 2019 10:00 AM CDT   CT CHEST ABDOMEN PELVIS W/O & W CONTRAST with UCCT1   [facility] ([place] [facility])    [address]  1st Floor  [place] [address] [phone]           How do I prepare for my exam? (Food and drink instructions) To prepare: Do not eat or drink for 2 hours before your exam. If you need to take medicine, you may take it with small sips of water. (We may ask you to take liquid medicine as well.)  How do I prepare for my exam? (Other instructions) Please arrive 30 minutes early for your CT.  Once in the department you might be  asked to drink water 15-20 minutes prior to your exam.  If indicated you may be asked to drink an oral contrast in advance of your CT.  If this is the case, the imaging team will let you know or be in contact with you prior to your appointment  Patients over 70 or patients with diabetes or kidney problems: If you haven t had a blood test (creatinine test) within the last 30 days, the Cardiologist/Radiologist may require you to get this test prior to your exam.  If you have diabetes:  Continue to take your metformin medication on the day of your exam  What should I wear: Please wear loose clothing, such as a sweat suit or jogging clothes. Avoid snaps, zippers and other metal. We may ask you to undress and put on a hospital gown.  How long does the exam take: Most scans take less than 20 minutes.  What should I bring: Please bring any scans or X-rays taken at other hospitals, if similar tests were done. Also bring a list of your medicines, including vitamins, minerals and over-the-counter drugs. It is safest to leave personal items at home.  Do I need a : No  is needed.  What do I need to tell my doctor? Be sure to tell your doctor: * If you have any allergies. * If there s any chance you are pregnant. * If you are breastfeeding.  What should I do after the exam: No restrictions, You may resume normal activities.  What is this test: A CT (computed tomography) scan is a series of pictures that allows us to look inside your body. The scanner creates images of the body in cross sections, much like slices of bread. This helps us see any problems more clearly. You may receive contrast (X-ray dye) before or during your scan. You will be asked to drink the contrast.  Who should I call with questions: If you have any questions, please call the Imaging Department where you will have your exam. Directions, parking instructions, and other information is available on our website, [email or web address].            Mar  26, 2019 12:15 PM CDT   [facility] with  [facility]   [facility] ([facility])    [address]  [address]  [place] [address] [phone]            Mar 26, 2019 12:45 PM CDT   RETURN ONC with Lenore Rodriguez MD   [facility] ([facility])    [address]  [address]  [place] [address] [phone]              Future tests that were ordered for you today     Open Future Orders        Priority Expected Expires Ordered    CRP inflammation Routine  12/1/2018 11/1/2018    Dexa hip/pelvis/spine Routine  5/30/2019 11/1/2018            Who to contact     If you have questions or need follow up information about today's clinic visit or your schedule please contact [facility] directly at [phone].  Normal or non-critical lab and imaging results will be communicated to you by [employer], letter or phone within 4 business days after the clinic has received the results. If you do not hear from us within 7 days, please contact the clinic through [email or web address] or phone. If you have a critical or abnormal lab result, we will notify you by phone as soon as possible.  Submit refill requests through [employer] or call your pharmacy and they will forward the refill request to us. Please allow 3 business days for your refill to be completed.          Additional Information About Your Visit        [employer] Information     [employer] gives you secure access to your electronic health record. If you see a primary care provider, you can also send messages to your care team and make appointments. If you have questions, please call your primary care clinic.  If you do not have a primary care provider, please call [phone] and they will assist you.        Care EveryWhere ID     This is your Care EveryWhere ID. This could be used by other organizations to access your [facility]  "records  [id number]        Your Vitals Were     Pulse Temperature Height Pulse Oximetry BMI (Body Mass Index)       98 98.2  F (36.8  C) (Oral) 1.473 m (4' 10\") 95% 25.44 kg/m2        Blood Pressure from Last 3 Encounters:   11/01/18 119/78   10/04/18 132/74   09/26/18 136/84    Weight from Last 3 Encounters:   11/01/18 55.2 kg (121 lb 11.2 oz)   10/04/18 55.2 kg (121 lb 12.8 oz)   09/26/18 55.7 kg (122 lb 14.4 oz)                 Today's Medication Changes          These changes are accurate as of 11/1/18  3:56 PM.  If you have any questions, ask your nurse or doctor.               Start taking these medicines.        Dose/Directions    gabapentin 100 MG capsule   Commonly known as:  NEURONTIN   Used for:  Critical illness myopathy   Started by:  Nayeli Pritchett MD        Dose:  200 mg   Take 2 capsules (200 mg) by mouth 3 times daily   Quantity:  180 capsule   Refills:  3         These medicines have changed or have updated prescriptions.        Dose/Directions    calcium carbonate 600 mg-vitamin D 400 units 600-400 MG-UNIT per tablet   Commonly known as:  CALTRATE   This may have changed:  how much to take   Used for:  Diffuse large B-cell lymphoma, unspecified body region (H)        Dose:  2 tablet   Take 2 tablets by mouth every morning   Quantity:  60 tablet   Refills:  0       * predniSONE 5 MG tablet   Commonly known as:  DELTASONE   This may have changed:  Another medication with the same name was added. Make sure you understand how and when to take each.   Used for:  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 (H)   Changed by:  Nayeli Pritchett MD        Dose:  5 mg   Take 1 tablet (5 mg) by mouth daily   Quantity:  90 tablet   Refills:  2       * predniSONE 5 MG tablet   Commonly known as:  DELTASONE   This may have changed:  You were already taking a medication with the same name, and this prescription was added. Make sure you understand how and " when to take each.   Used for:  Rheumatoid arthritis involving multiple sites with positive rheumatoid factor (H)   Changed by:  Nayeli Pritchett MD        prednisone to 15 mg for 1 week, then 10 mg for 1 week, then back to 5 mg/day   Quantity:  90 tablet   Refills:  0       * Notice:  This list has 2 medication(s) that are the same as other medications prescribed for you. Read the directions carefully, and ask your doctor or other care provider to review them with you.         Where to get your medicines      These medications were sent to [facility] [place] - [address]  [address], [place] [address]     Phone:  [phone]     gabapentin 100 MG capsule    predniSONE 5 MG tablet                Primary Care Provider Office Phone # Fax #    Gala Stringer PA-C [phone] [address] [address]        Equal Access to Services     [facility] : Hadii laurita mcguire hadasho Soomaali, waaxda luqadaha, qaybta kaalmada adeegyada, waxay ednain hayfloydn jesica hermosillo . So [facility] [phone].    ATENCIÓN: Si habla español, tiene a sarmiento disposición servicios gratuitos de asistencia lingüística. [facility] [phone].    We comply with applicable federal civil rights laws and [place] laws. We do not discriminate on the basis of race, color, national origin, age, disability, sex, sexual orientation, or gender identity.            Thank you!     Thank you for choosing [facility]  for your care. Our goal is always to provide you with excellent care. Hearing back from our patients is one way we can continue to improve our services. Please take a few minutes to complete the written survey that you may receive in the mail after your visit with us. Thank you!             Your Updated Medication List - Protect others around you: Learn how to safely use, store and throw away your medicines at www.disposemymeds.org.          This list is  accurate as of 11/1/18  3:56 PM.  Always use your most recent med list.                   Brand Name Dispense Instructions for use Diagnosis    acetaminophen 325 MG tablet    TYLENOL     Take 2 tablets (650 mg) by mouth every 4 hours as needed for mild pain, fever or headaches    Diffuse large B-cell lymphoma of extranodal site (H)       apixaban ANTICOAGULANT 5 MG tablet    ELIQUIS    180 tablet    Take 1 tablet (5 mg) by mouth 2 times daily    Paroxysmal atrial fibrillation (H)       ascorbic acid 500 MG Tabs     30 tablet    Take 1 tablet (500 mg) by mouth daily    Diffuse large B-cell lymphoma, unspecified body region (H)       atenolol 25 MG tablet    TENORMIN    180 tablet    Take 1 tablet (25 mg) by mouth 2 times daily    Atrial tachycardia (H)       calcium carbonate 600 mg-vitamin D 400 units 600-400 MG-UNIT per tablet    CALTRATE    60 tablet    Take 2 tablets by mouth every morning    Diffuse large B-cell lymphoma, unspecified body region (H)       calcium polycarbophil 625 MG tablet    FIBERCON     Take 1 tablet by mouth daily        digoxin 125 MCG tablet    LANOXIN    90 tablet    Take 250mcg daily for 2 days, then decrease to 125mcg daily    Atrial flutter, unspecified type (H)       furosemide 20 MG tablet    LASIX    90 tablet    Take 1 tablet (20 mg) by mouth daily    Chronic systolic heart failure (H)       gabapentin 100 MG capsule    NEURONTIN    180 capsule    Take 2 capsules (200 mg) by mouth 3 times daily    Critical illness myopathy       * gabapentin 8 % Gel topical PLO cream     100 g    Apply thin layer to area of neuropathy    Neuropathy       * gabapentin Powd           HYDROCORTISONE PO      Take 100 mg by mouth IV        hydroxychloroquine 200 MG tablet    PLAQUENIL    180 tablet    Take 1 tablet (200 mg) by mouth 2 times daily    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 (H)       multivitamin, therapeutic with minerals Tabs tablet     30 each    Take  1 tablet by mouth daily    Diffuse large B-cell lymphoma, unspecified body region (H)       order for DME     1 Device    Equipment being ordered: BP cuff    Hypertension, unspecified type       order for DME     1 Units    Equipment being ordered: Thigh high compression stockings Class 2: off the shelf or custom, farrow foot wrap and juxta fit premium lower leg wrap, lymphedema bandaging supplies 3 sets    Lymphedema of both lower extremities       * predniSONE 5 MG tablet    DELTASONE    90 tablet    Take 1 tablet (5 mg) by mouth daily    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 (H)       * predniSONE 5 MG tablet    DELTASONE    90 tablet    prednisone to 15 mg for 1 week, then 10 mg for 1 week, then back to 5 mg/day    Rheumatoid arthritis involving multiple sites with positive rheumatoid factor (H)       spironolactone 25 MG tablet    ALDACTONE    90 tablet    Take 1 tablet (25 mg) by mouth daily    Acute on chronic diastolic heart failure (H)       tolterodine 4 MG 24 hr capsule    DETROL LA    30 capsule    Take 1 capsule (4 mg) by mouth daily    Urinary urgency, Urge incontinence       * Notice:  This list has 4 medication(s) that are the same as other medications prescribed for you. Read the directions carefully, and ask your doctor or other care provider to review them with you.

## 2018-11-01 NOTE — PATIENT INSTRUCTIONS
- increase prednisone to 15 mg for 1 week, then 10 mg for 1 week, then back to 5 mg/day  - use ice packs as needed for pain  - continue plaquenil  - please schedule a DEXA scan for bone health screening

## 2018-11-01 NOTE — LETTER
2018       RE: Amelia Michel  [address]  [facility] [address]     Dear Colleague,    Thank you for referring your patient, Amelia Michel, to the [facility] at [facility]. Please see a copy of my visit note below.    [facility]  Rheumatology Clinic  Nayeli Pritchett MD  2018     Name: Amelia Michel  MRN: [id number]  Age: 57 year old  : 1960  Referring provider: Comfort Sabillon    Assessment and Plan:  #Seropositive rheumatoid arthritis (, RF 31) with multiple joint disability including MTP fusion 1-5 bilaterally, partial right wrist fusion, subluxation and ulnar deformity of MCP's  #Diffuse large B-cell lymphoma status-post 1 cycle R-EPOCH, 6 cycles R-CHOP  #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   #Slowly healing wound in medial left antecubital fossa after traumatic IV  #Long-term corticosteroid use  #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 We discussed options today for pain management, and she elected a short prednisone burst 15-10-5, as well as ice packs. We discussed an option for tapering off prednisone in the future, and using an alternative (leflunomide or rituximab) for RA. She would like to avoid adding medications at this time.     Will wear wrist splint with activity  Will get DEXA for bone health  Plaquenil eye exam 10/18 and OCT ok (other than cataracts) with a recommendation to return in 1 year     Plan:  - Dexa hip/pelvis/spine  - predniSONE (DELTASONE) 5 MG tablet  Dispense: 90 tablet; Refill: 0  - gabapentin (NEURONTIN) 100 MG capsule  Dispense: 180 capsule; Refill: 3     Follow-up: Return in about 3 months (around  2/1/2019).    Subjective:  Today, she reports that she has had increased pain in her right wrist which she attributes to recent increase in activity. She has recently been planting and pulling weeds out of her garden. Is currently wearing a brace for her wrist to alleviate the pain. Her shoulder pain improved after being treated with colchicine. She currently wears a brace on her feet to treat her neuropathy. She notes that these braces alter her gait, and as a result, has had some knee and low back pain.     She has continued on 5mg prednisone. She has previously been treated with sulfasalazine and initially developed a rash which was initially thought to be a sulfa rash, but her rash resolved and did not reoccur after being placed back on sulfasalazine. She was also treated with Arava, but this discontinued during her cancer treatment. She feels that these drugs have had no effect on her symptoms. Has not had a recent DEXA scan. Her last scan was greater than 10 years ago. Labs are scheduled for May of 2019. She had an ocular CT yesterday which was unremarkable. She has no other concerns today.     HPI:   Amelia Michel is a 57 year old female with a history of rheumatoid arthritis who presents for follow up.     Interval history 3/30/18  On 3/30/18, her rheumatoid arthritis appeared to be under good control. Her plan at this time was to continue prednisone 5mg daily and HCQ 200mg twice a day. She was told to have an eye exam and follow up in 3 months. It was recommended that she get a DEXA scan to evaluate whether bisphosphonate was indicated (had 7-8 years of Fosamax in early 2000's). Previous use of methotrexate has been linked to her development of a severe neuropathy in the digits of her upper and lower extremities. She has also previously used arava.    Interval history 5/31/18  The patient reports spraining her ankle prior to her cardiac arrest, which caused some neuropathy in her sprained foot. Therefore  she is currently wearing an ankle brace and using a walker. However, her arthritis is doing fine she reports that the colchicine she was recently prescribed has been very effective. She mentions that she is switching insurance companies and had an unexpected arm surgery, which has prevented her from getting an eye exam that she is aware of being overdue for. She was also laid off recently but was able to fill the majority of her medications and made the most of her previous insurance so she should be able to manage for a while. She has undergone multiple CT scans and is currently finished with chemotherapy. Lymphoma is in remission and her heart is doing fine. She is interested in the options she has for medications that manage her rheumatoid arthritis and her lymphoma. She has taken many medications including plaquenil, prednisone, arava, methotrexate, orencia, and rituximab, and has plenty of options to choose from if she wants to lower the cost of prescriptions or avoid unwanted side-effects. The plan at this time was to continue prednisone 5mg daily.     Review of Systems:   Pertinent items are noted in HPI or as below, remainder of complete ROS is negative.      No recent problems with hearing or vision. No swallowing problems.   No breathing difficulty, shortness of breath, coughing, or wheezing  No chest pain or palpitations  No heart burn, indigestion, abdominal pain, nausea, vomiting, diarrhea  No urination problems, no bloody, cloudy urine, no dysuria  No numbing, tingling, weakness  No headaches or confusion  No rashes. No easy bleeding or bruising.     Active Medications:     Current Outpatient Prescriptions:      acetaminophen (TYLENOL) 325 MG tablet, Take 2 tablets (650 mg) by mouth every 4 hours as needed for mild pain, fever or headaches, Disp: , Rfl:      apixaban ANTICOAGULANT (ELIQUIS) 5 MG tablet, Take 1 tablet (5 mg) by mouth 2 times daily, Disp: 180 tablet, Rfl: 3     ascorbic acid 500 MG TABS,  Take 1 tablet (500 mg) by mouth daily, Disp: 30 tablet, Rfl: 0     atenolol (TENORMIN) 25 MG tablet, Take 1 tablet (25 mg) by mouth 2 times daily, Disp: 180 tablet, Rfl: 3     calcium polycarbophil (FIBERCON) 625 MG tablet, Take 1 tablet by mouth 2 times daily, Disp: , Rfl:      calcium-vitamin D (CALTRATE) 600-400 MG-UNIT per tablet, Take 2 tablets by mouth every morning (Patient taking differently: Take 1 tablet by mouth every morning ), Disp: 60 tablet, Rfl: 0     digoxin (LANOXIN) 125 MCG tablet, Take 250mcg daily for 2 days, then decrease to 125mcg daily, Disp: 90 tablet, Rfl: 3     furosemide (LASIX) 20 MG tablet, Take 1 tablet (20 mg) by mouth daily, Disp: 90 tablet, Rfl: 3     gabapentin (NEURONTIN) 100 MG capsule, TAKE TWO CAPSULES BY MOUTH THREE TIMES A DAY, Disp: 180 capsule, Rfl: 2     gabapentin 8 % GEL topical PLO cream, Apply thin layer to area of neuropathy, Disp: 100 g, Rfl: 3     gabapentin POWD, , Disp: , Rfl:      HYDROCORTISONE PO, Take 100 mg by mouth IV, Disp: , Rfl:      hydroxychloroquine (PLAQUENIL) 200 MG tablet, Take 1 tablet (200 mg) by mouth 2 times daily, Disp: 180 tablet, Rfl: 0     multivitamin, therapeutic with minerals (THERA-VIT-M) TABS tablet, Take 1 tablet by mouth daily, Disp: 30 each, Rfl: 0     order for DME, Equipment being ordered: Thigh high compression stockings Class 2: off the shelf or custom, farrow foot wrap and juxta fit premium lower leg wrap, lymphedema bandaging supplies 3 sets, Disp: 1 Units, Rfl: 1     order for DME, Equipment being ordered: BP cuff, Disp: 1 Device, Rfl: 1     predniSONE (DELTASONE) 5 MG tablet, Take 1 tablet (5 mg) by mouth daily, Disp: 90 tablet, Rfl: 2     spironolactone (ALDACTONE) 25 MG tablet, Take 1 tablet (25 mg) by mouth daily, Disp: 90 tablet, Rfl: 0     tolterodine (DETROL LA) 4 MG 24 hr capsule, Take 1 capsule (4 mg) by mouth daily, Disp: 30 capsule, Rfl: 11      Allergies:   Blood transfusion related (informational only);  "Metoprolol; No clinical screening - see comments; Penicillins; and Tape [adhesive tape]      Past Medical History:  Antiplatelet or antithrombotic long-term use  Arrhythmia  Atrial tachycardia, flutter  Paroxysmal atrial fibrillation  Arthritis  Lymphoma  Cardiac arrest  history of chemotherapy  Primary pulmonary hypertension  Neuropathy  Postoperative nausea and vomiting  Rheumatoid arthritis  Hyperlipidemia LDL goal <130  Lymphedema of both lower extremities  Cardiogenic shock  Acute respiratory failure with hypoxia  Critical illness myopathy  Sepsis  Wound of left upper extremity  Diffuse large B-cell lymphoma of extranodal site  Febrile neutropenia  Physical deconditioning  Palpitations     Past Surgical History:  Anesthesia cardioversion  Right wrist arthrodesis  Bone marrow aspirate & biopsy  Excise lesion upper extremity - left ar wound excision and closure, possible submuscular transposition of ulnar nerve  Foot surgery - 4 left, 2 right  Carpal tunnel bilateral release  Repair ventricular septal defect  Transpositional ulnar nerve (elbow)    Family History:   Mother - breast cancer, hypertension, alzheimer disease  Father - hypertension, lymphoma, circulatory A fib  Paternal grandmother - diabetes     Social History:   No smoking or tobacco use  No alcohol use     Physical Exam:   /78  Pulse 98  Temp 98.2  F (36.8  C) (Oral)  Ht 1.473 m (4' 10\")  Wt 55.2 kg (121 lb 11.2 oz)  SpO2 95%  BMI 25.44 kg/m2   Wt Readings from Last 4 Encounters:   11/01/18 55.2 kg (121 lb 11.2 oz)   10/04/18 55.2 kg (121 lb 12.8 oz)   09/26/18 55.7 kg (122 lb 14.4 oz)   08/15/18 53.8 kg (118 lb 8 oz)     Constitutional: Well-developed, appearing stated age; cooperative  Eyes: Normal EOM, PERRLA, vision, conjunctiva, sclera  ENT: Normal external ears, nose, hearing, lips, teeth, gums  Neck: No mass or thyroid enlargement  : Not tested  Lymph: No cervical, supraclavicular, or epitrochlear nodes  MS:   Right wrist is " warm and swollen s/p fusion tender to palpation. Markedly limited ROM at baseline. Several deformities present in right and left fingers, worse in right. In particular, right 2nd and 3rd MCP joints with mild chronic synoviis. Ulnar deviation bilaterally. Mild chronic synovitis at 1st MCP, 3rd PIP on right as well as left wrist. Trace in left 3rd PIP.  Skin: No nail pitting, alopecia, rash, nodules or lesions; + hair loss  Neuro: + chronic polyneuropathy with deminished sensation in BL LE.   Psych: Normal judgement, orientation, memory, affect.     Images:  Eye Exam (10/23/18)  Significant for mild H-lated nuclear cataracts.   Ocular CT was recommended for right eye.     CT Chest/Abdomen/Pelvis (9/26/18)  In this patient with a history of lymphoma:  1. No evidence of disease recurrence, consistent with complete  response per Lug[place] criteria.  2. Stable cardiomegaly. Improved pericardial effusion.  3. Early contrast phase with heterogeneous visceral enhancement in the  abdomen, likely secondary to cardiac dysfunction.    MRI Cardiac w/contrast (4/12/18)  Loculated exudative pericardial effusion that is beginning to organize, with diffuse  pericardial enhancement consistent with ongoing inflammation. Normal LV fxn, LVEF 54% and RVEF 49%. At  least moderate, possibly severe tricuspid regurgitation. Compared to MRI from 03/2018, effusion is largely  unchanged in size (maybe a bit smaller) but is starting to organize. No change in pericardial enhancement.    Laboratory:   Component      Latest Ref Rng & Units 10/4/2018   HPV Source       Sure[facility]   HPV 16 DNA      NEG:Negative Negative   HPV 18 DNA      NEG:Negative Negative   Other HR HPV      NEG:Negative Negative   Final Diagnosis       This patient's sample is negative for HPV DNA.   Specimen Description       Cervical Cells   PAP       NIL   Copath Report       Patient Name: FIDELIA MIDDLETON .   Digoxin Level      0.5 - 2.0 ug/L 0.6   Hemoglobin A1C      0 - 5.6  % 5.3   Vitamin D Deficiency screening      20 - 75 ug/L 48            Scribe Disclosure:   I, Harpal Baca, am serving as a scribe to document services personally performed by Nayeli Pritchett MD at this visit, based upon the provider's statements to me. All documentation has been reviewed by the aforementioned provider prior to being entered into the official medical record.     Portions of this medical record were completed by a scribe. UPON MY REVIEW AND AUTHENTICATION BY ELECTRONIC SIGNATURE, this confirms (a) I performed the applicable clinical services, and (b) the record is accurate.    Attending Attestation:    I reviewed and edited the above scribed note to reflect my findings and plan.     I discussed the findings and recommendations with the patient.  Recommendations were discussed with the consulting team.  Time spent: 30 minutes    Naeyli Pritchett MD, MS  Rheumatology Attending Physician  pgr [phone]

## 2018-11-01 NOTE — NURSING NOTE
Chief Complaint   Patient presents with     RECHECK     Joint Pain   Pt roomed, vitals, meds, and allergies reviewed with pt. Pt ready for provider.  Eric Parmar, CMA

## 2018-11-01 NOTE — PROGRESS NOTES
[facility]  Rheumatology Clinic  Nayeli Pritchett MD  2018     Name: Amelia Michel  MRN: [id number]  Age: 57 year old  : 1960  Referring provider: Comfort Sabillon    Assessment and Plan:  #Seropositive rheumatoid arthritis (, RF 31) with multiple joint disability including MTP fusion 1-5 bilaterally, partial right wrist fusion, subluxation and ulnar deformity of MCP's  #Diffuse large B-cell lymphoma status-post 1 cycle R-EPOCH, 6 cycles R-CHOP  #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   #Slowly healing wound in medial left antecubital fossa after traumatic IV  #Long-term corticosteroid use  #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 We discussed options today for pain management, and she elected a short prednisone burst 15-10-5, as well as ice packs. We discussed an option for tapering off prednisone in the future, and using an alternative (leflunomide or rituximab) for RA. She would like to avoid adding medications at this time.     Will wear wrist splint with activity  Will get DEXA for bone health  Plaquenil eye exam 10/18 and OCT ok (other than cataracts) with a recommendation to return in 1 year     Plan:  - Dexa hip/pelvis/spine  - predniSONE (DELTASONE) 5 MG tablet  Dispense: 90 tablet; Refill: 0  - gabapentin (NEURONTIN) 100 MG capsule  Dispense: 180 capsule; Refill: 3     Follow-up: Return in about 3 months (around 2019).    Subjective:  Today, she reports that she has had increased pain in her right wrist which she attributes to recent increase in activity. She has recently been planting and pulling weeds out of her garden. Is currently wearing a brace for her wrist to alleviate the pain. Her shoulder pain  improved after being treated with colchicine. She currently wears a brace on her feet to treat her neuropathy. She notes that these braces alter her gait, and as a result, has had some knee and low back pain.     She has continued on 5mg prednisone. She has previously been treated with sulfasalazine and initially developed a rash which was initially thought to be a sulfa rash, but her rash resolved and did not reoccur after being placed back on sulfasalazine. She was also treated with Arava, but this discontinued during her cancer treatment. She feels that these drugs have had no effect on her symptoms. Has not had a recent DEXA scan. Her last scan was greater than 10 years ago. Labs are scheduled for May of 2019. She had an ocular CT yesterday which was unremarkable. She has no other concerns today.     HPI:   Amelia Michel is a 57 year old female with a history of rheumatoid arthritis who presents for follow up.     Interval history 3/30/18  On 3/30/18, her rheumatoid arthritis appeared to be under good control. Her plan at this time was to continue prednisone 5mg daily and HCQ 200mg twice a day. She was told to have an eye exam and follow up in 3 months. It was recommended that she get a DEXA scan to evaluate whether bisphosphonate was indicated (had 7-8 years of Fosamax in early 2000's). Previous use of methotrexate has been linked to her development of a severe neuropathy in the digits of her upper and lower extremities. She has also previously used arava.    Interval history 5/31/18  The patient reports spraining her ankle prior to her cardiac arrest, which caused some neuropathy in her sprained foot. Therefore she is currently wearing an ankle brace and using a walker. However, her arthritis is doing fine she reports that the colchicine she was recently prescribed has been very effective. She mentions that she is switching insurance companies and had an unexpected arm surgery, which has prevented her from  getting an eye exam that she is aware of being overdue for. She was also laid off recently but was able to fill the majority of her medications and made the most of her previous insurance so she should be able to manage for a while. She has undergone multiple CT scans and is currently finished with chemotherapy. Lymphoma is in remission and her heart is doing fine. She is interested in the options she has for medications that manage her rheumatoid arthritis and her lymphoma. She has taken many medications including plaquenil, prednisone, arava, methotrexate, orencia, and rituximab, and has plenty of options to choose from if she wants to lower the cost of prescriptions or avoid unwanted side-effects. The plan at this time was to continue prednisone 5mg daily.     Review of Systems:   Pertinent items are noted in HPI or as below, remainder of complete ROS is negative.      No recent problems with hearing or vision. No swallowing problems.   No breathing difficulty, shortness of breath, coughing, or wheezing  No chest pain or palpitations  No heart burn, indigestion, abdominal pain, nausea, vomiting, diarrhea  No urination problems, no bloody, cloudy urine, no dysuria  No numbing, tingling, weakness  No headaches or confusion  No rashes. No easy bleeding or bruising.     Active Medications:     Current Outpatient Prescriptions:      acetaminophen (TYLENOL) 325 MG tablet, Take 2 tablets (650 mg) by mouth every 4 hours as needed for mild pain, fever or headaches, Disp: , Rfl:      apixaban ANTICOAGULANT (ELIQUIS) 5 MG tablet, Take 1 tablet (5 mg) by mouth 2 times daily, Disp: 180 tablet, Rfl: 3     ascorbic acid 500 MG TABS, Take 1 tablet (500 mg) by mouth daily, Disp: 30 tablet, Rfl: 0     atenolol (TENORMIN) 25 MG tablet, Take 1 tablet (25 mg) by mouth 2 times daily, Disp: 180 tablet, Rfl: 3     calcium polycarbophil (FIBERCON) 625 MG tablet, Take 1 tablet by mouth 2 times daily, Disp: , Rfl:      calcium-vitamin D  (CALTRATE) 600-400 MG-UNIT per tablet, Take 2 tablets by mouth every morning (Patient taking differently: Take 1 tablet by mouth every morning ), Disp: 60 tablet, Rfl: 0     digoxin (LANOXIN) 125 MCG tablet, Take 250mcg daily for 2 days, then decrease to 125mcg daily, Disp: 90 tablet, Rfl: 3     furosemide (LASIX) 20 MG tablet, Take 1 tablet (20 mg) by mouth daily, Disp: 90 tablet, Rfl: 3     gabapentin (NEURONTIN) 100 MG capsule, TAKE TWO CAPSULES BY MOUTH THREE TIMES A DAY, Disp: 180 capsule, Rfl: 2     gabapentin 8 % GEL topical PLO cream, Apply thin layer to area of neuropathy, Disp: 100 g, Rfl: 3     gabapentin POWD, , Disp: , Rfl:      HYDROCORTISONE PO, Take 100 mg by mouth IV, Disp: , Rfl:      hydroxychloroquine (PLAQUENIL) 200 MG tablet, Take 1 tablet (200 mg) by mouth 2 times daily, Disp: 180 tablet, Rfl: 0     multivitamin, therapeutic with minerals (THERA-VIT-M) TABS tablet, Take 1 tablet by mouth daily, Disp: 30 each, Rfl: 0     order for DME, Equipment being ordered: Thigh high compression stockings Class 2: off the shelf or custom, farrow foot wrap and juxta fit premium lower leg wrap, lymphedema bandaging supplies 3 sets, Disp: 1 Units, Rfl: 1     order for DME, Equipment being ordered: BP cuff, Disp: 1 Device, Rfl: 1     predniSONE (DELTASONE) 5 MG tablet, Take 1 tablet (5 mg) by mouth daily, Disp: 90 tablet, Rfl: 2     spironolactone (ALDACTONE) 25 MG tablet, Take 1 tablet (25 mg) by mouth daily, Disp: 90 tablet, Rfl: 0     tolterodine (DETROL LA) 4 MG 24 hr capsule, Take 1 capsule (4 mg) by mouth daily, Disp: 30 capsule, Rfl: 11      Allergies:   Blood transfusion related (informational only); Metoprolol; No clinical screening - see comments; Penicillins; and Tape [adhesive tape]      Past Medical History:  Antiplatelet or antithrombotic long-term use  Arrhythmia  Atrial tachycardia, flutter  Paroxysmal atrial fibrillation  Arthritis  Lymphoma  Cardiac arrest  history of chemotherapy  Primary  "pulmonary hypertension  Neuropathy  Postoperative nausea and vomiting  Rheumatoid arthritis  Hyperlipidemia LDL goal <130  Lymphedema of both lower extremities  Cardiogenic shock  Acute respiratory failure with hypoxia  Critical illness myopathy  Sepsis  Wound of left upper extremity  Diffuse large B-cell lymphoma of extranodal site  Febrile neutropenia  Physical deconditioning  Palpitations     Past Surgical History:  Anesthesia cardioversion  Right wrist arthrodesis  Bone marrow aspirate & biopsy  Excise lesion upper extremity - left ar wound excision and closure, possible submuscular transposition of ulnar nerve  Foot surgery - 4 left, 2 right  Carpal tunnel bilateral release  Repair ventricular septal defect  Transpositional ulnar nerve (elbow)    Family History:   Mother - breast cancer, hypertension, alzheimer disease  Father - hypertension, lymphoma, circulatory A fib  Paternal grandmother - diabetes     Social History:   No smoking or tobacco use  No alcohol use     Physical Exam:   /78  Pulse 98  Temp 98.2  F (36.8  C) (Oral)  Ht 1.473 m (4' 10\")  Wt 55.2 kg (121 lb 11.2 oz)  SpO2 95%  BMI 25.44 kg/m2   Wt Readings from Last 4 Encounters:   11/01/18 55.2 kg (121 lb 11.2 oz)   10/04/18 55.2 kg (121 lb 12.8 oz)   09/26/18 55.7 kg (122 lb 14.4 oz)   08/15/18 53.8 kg (118 lb 8 oz)     Constitutional: Well-developed, appearing stated age; cooperative  Eyes: Normal EOM, PERRLA, vision, conjunctiva, sclera  ENT: Normal external ears, nose, hearing, lips, teeth, gums  Neck: No mass or thyroid enlargement  : Not tested  Lymph: No cervical, supraclavicular, or epitrochlear nodes  MS:  Right wrist is warm and swollen s/p fusion tender to palpation. Markedly limited ROM at baseline. Several deformities present in right and left fingers, worse in right. In particular, right 2nd and 3rd MCP joints with mild chronic synoviis. Ulnar deviation bilaterally. Mild chronic synovitis at 1st MCP, 3rd PIP on right as " well as left wrist. Trace in left 3rd PIP.  Skin: No nail pitting, alopecia, rash, nodules or lesions; + hair loss  Neuro: + chronic polyneuropathy with deminished sensation in BL LE.   Psych: Normal judgement, orientation, memory, affect.     Images:  Eye Exam (10/23/18)  Significant for mild H-lated nuclear cataracts.   Ocular CT was recommended for right eye.     CT Chest/Abdomen/Pelvis (9/26/18)  In this patient with a history of lymphoma:  1. No evidence of disease recurrence, consistent with complete  response per [place] criteria.  2. Stable cardiomegaly. Improved pericardial effusion.  3. Early contrast phase with heterogeneous visceral enhancement in the  abdomen, likely secondary to cardiac dysfunction.    MRI Cardiac w/contrast (4/12/18)  Loculated exudative pericardial effusion that is beginning to organize, with diffuse  pericardial enhancement consistent with ongoing inflammation. Normal LV fxn, LVEF 54% and RVEF 49%. At  least moderate, possibly severe tricuspid regurgitation. Compared to MRI from 03/2018, effusion is largely  unchanged in size (maybe a bit smaller) but is starting to organize. No change in pericardial enhancement.    Laboratory:   Component      Latest Ref Rng & Units 10/4/2018   HPV Source       [facility]   HPV 16 DNA      NEG:Negative Negative   HPV 18 DNA      NEG:Negative Negative   Other HR HPV      NEG:Negative Negative   Final Diagnosis       This patient's sample is negative for HPV DNA.   Specimen Description       Cervical Cells   PAP       NIL   Copath Report       Patient Name: FIDELIA MIDDLETON   Digoxin Level      0.5 - 2.0 ug/L 0.6   Hemoglobin A1C      0 - 5.6 % 5.3   Vitamin D Deficiency screening      20 - 75 ug/L 48            Scribe Disclosure:   Harpal CHAVEZ, am serving as a scribe to document services personally performed by Nayeli Pritchett MD at this visit, based upon the provider's statements to me. All documentation has been reviewed by the  aforementioned provider prior to being entered into the official medical record.     Portions of this medical record were completed by a scribe. UPON MY REVIEW AND AUTHENTICATION BY ELECTRONIC SIGNATURE, this confirms (a) I performed the applicable clinical services, and (b) the record is accurate.    Attending Attestation:    I reviewed and edited the above scribed note to reflect my findings and plan.     I discussed the findings and recommendations with the patient.  Recommendations were discussed with the consulting team.  Time spent: 30 minutes    Nayeli Pritchett MD, MS  Rheumatology Attending Physician  [facility] [phone]

## 2018-11-01 NOTE — LETTER
2018      RE: Amelia Michel  [address]  [facility] [address]       [facility]  Rheumatology Clinic  Nayeli Pritchett MD  2018     Name: Amelia Michel  MRN: [id number]  Age: 57 year old  : 1960  Referring provider: Comfort Sabillon    Assessment and Plan:  #Seropositive rheumatoid arthritis (, RF 31) with multiple joint disability including MTP fusion 1-5 bilaterally, partial right wrist fusion, subluxation and ulnar deformity of MCP's  #Diffuse large B-cell lymphoma status-post 1 cycle R-EPOCH, 6 cycles R-CHOP  #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   #Slowly healing wound in medial left antecubital fossa after traumatic IV  #Long-term corticosteroid use  #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 We discussed options today for pain management, and she elected a short prednisone burst 15-10-5, as well as ice packs. We discussed an option for tapering off prednisone in the future, and using an alternative (leflunomide or rituximab) for RA. She would like to avoid adding medications at this time.     Will wear wrist splint with activity  Will get DEXA for bone health  Plaquenil eye exam 10/18 and OCT ok (other than cataracts) with a recommendation to return in 1 year     Plan:  - Dexa hip/pelvis/spine  - predniSONE (DELTASONE) 5 MG tablet  Dispense: 90 tablet; Refill: 0  - gabapentin (NEURONTIN) 100 MG capsule  Dispense: 180 capsule; Refill: 3     Follow-up: Return in about 3 months (around 2019).    Subjective:  Today, she reports that she has had increased pain in her right wrist which she attributes to recent increase in activity. She has recently been planting and pulling weeds out of her garden. Is  currently wearing a brace for her wrist to alleviate the pain. Her shoulder pain improved after being treated with colchicine. She currently wears a brace on her feet to treat her neuropathy. She notes that these braces alter her gait, and as a result, has had some knee and low back pain.     She has continued on 5mg prednisone. She has previously been treated with sulfasalazine and initially developed a rash which was initially thought to be a sulfa rash, but her rash resolved and did not reoccur after being placed back on sulfasalazine. She was also treated with Arava, but this discontinued during her cancer treatment. She feels that these drugs have had no effect on her symptoms. Has not had a recent DEXA scan. Her last scan was greater than 10 years ago. Labs are scheduled for May of 2019. She had an ocular CT yesterday which was unremarkable. She has no other concerns today.     HPI:   Amelia Michel is a 57 year old female with a history of rheumatoid arthritis who presents for follow up.     Interval history 3/30/18  On 3/30/18, her rheumatoid arthritis appeared to be under good control. Her plan at this time was to continue prednisone 5mg daily and HCQ 200mg twice a day. She was told to have an eye exam and follow up in 3 months. It was recommended that she get a DEXA scan to evaluate whether bisphosphonate was indicated (had 7-8 years of Fosamax in early 2000's). Previous use of methotrexate has been linked to her development of a severe neuropathy in the digits of her upper and lower extremities. She has also previously used arava.    Interval history 5/31/18  The patient reports spraining her ankle prior to her cardiac arrest, which caused some neuropathy in her sprained foot. Therefore she is currently wearing an ankle brace and using a walker. However, her arthritis is doing fine she reports that the colchicine she was recently prescribed has been very effective. She mentions that she is switching  insurance companies and had an unexpected arm surgery, which has prevented her from getting an eye exam that she is aware of being overdue for. She was also laid off recently but was able to fill the majority of her medications and made the most of her previous insurance so she should be able to manage for a while. She has undergone multiple CT scans and is currently finished with chemotherapy. Lymphoma is in remission and her heart is doing fine. She is interested in the options she has for medications that manage her rheumatoid arthritis and her lymphoma. She has taken many medications including plaquenil, prednisone, arava, methotrexate, orencia, and rituximab, and has plenty of options to choose from if she wants to lower the cost of prescriptions or avoid unwanted side-effects. The plan at this time was to continue prednisone 5mg daily.     Review of Systems:   Pertinent items are noted in HPI or as below, remainder of complete ROS is negative.      No recent problems with hearing or vision. No swallowing problems.   No breathing difficulty, shortness of breath, coughing, or wheezing  No chest pain or palpitations  No heart burn, indigestion, abdominal pain, nausea, vomiting, diarrhea  No urination problems, no bloody, cloudy urine, no dysuria  No numbing, tingling, weakness  No headaches or confusion  No rashes. No easy bleeding or bruising.     Active Medications:     Current Outpatient Prescriptions:      acetaminophen (TYLENOL) 325 MG tablet, Take 2 tablets (650 mg) by mouth every 4 hours as needed for mild pain, fever or headaches, Disp: , Rfl:      apixaban ANTICOAGULANT (ELIQUIS) 5 MG tablet, Take 1 tablet (5 mg) by mouth 2 times daily, Disp: 180 tablet, Rfl: 3     ascorbic acid 500 MG TABS, Take 1 tablet (500 mg) by mouth daily, Disp: 30 tablet, Rfl: 0     atenolol (TENORMIN) 25 MG tablet, Take 1 tablet (25 mg) by mouth 2 times daily, Disp: 180 tablet, Rfl: 3     calcium polycarbophil (FIBERCON) 625 MG  tablet, Take 1 tablet by mouth 2 times daily, Disp: , Rfl:      calcium-vitamin D (CALTRATE) 600-400 MG-UNIT per tablet, Take 2 tablets by mouth every morning (Patient taking differently: Take 1 tablet by mouth every morning ), Disp: 60 tablet, Rfl: 0     digoxin (LANOXIN) 125 MCG tablet, Take 250mcg daily for 2 days, then decrease to 125mcg daily, Disp: 90 tablet, Rfl: 3     furosemide (LASIX) 20 MG tablet, Take 1 tablet (20 mg) by mouth daily, Disp: 90 tablet, Rfl: 3     gabapentin (NEURONTIN) 100 MG capsule, TAKE TWO CAPSULES BY MOUTH THREE TIMES A DAY, Disp: 180 capsule, Rfl: 2     gabapentin 8 % GEL topical PLO cream, Apply thin layer to area of neuropathy, Disp: 100 g, Rfl: 3     gabapentin POWD, , Disp: , Rfl:      HYDROCORTISONE PO, Take 100 mg by mouth IV, Disp: , Rfl:      hydroxychloroquine (PLAQUENIL) 200 MG tablet, Take 1 tablet (200 mg) by mouth 2 times daily, Disp: 180 tablet, Rfl: 0     multivitamin, therapeutic with minerals (THERA-VIT-M) TABS tablet, Take 1 tablet by mouth daily, Disp: 30 each, Rfl: 0     order for DME, Equipment being ordered: Thigh high compression stockings Class 2: off the shelf or custom, farrow foot wrap and juxta fit premium lower leg wrap, lymphedema bandaging supplies 3 sets, Disp: 1 Units, Rfl: 1     order for DME, Equipment being ordered: BP cuff, Disp: 1 Device, Rfl: 1     predniSONE (DELTASONE) 5 MG tablet, Take 1 tablet (5 mg) by mouth daily, Disp: 90 tablet, Rfl: 2     spironolactone (ALDACTONE) 25 MG tablet, Take 1 tablet (25 mg) by mouth daily, Disp: 90 tablet, Rfl: 0     tolterodine (DETROL LA) 4 MG 24 hr capsule, Take 1 capsule (4 mg) by mouth daily, Disp: 30 capsule, Rfl: 11      Allergies:   Blood transfusion related (informational only); Metoprolol; No clinical screening - see comments; Penicillins; and Tape [adhesive tape]      Past Medical History:  Antiplatelet or antithrombotic long-term use  Arrhythmia  Atrial tachycardia, flutter  Paroxysmal atrial  "fibrillation  Arthritis  Lymphoma  Cardiac arrest  history of chemotherapy  Primary pulmonary hypertension  Neuropathy  Postoperative nausea and vomiting  Rheumatoid arthritis  Hyperlipidemia LDL goal <130  Lymphedema of both lower extremities  Cardiogenic shock  Acute respiratory failure with hypoxia  Critical illness myopathy  Sepsis  Wound of left upper extremity  Diffuse large B-cell lymphoma of extranodal site  Febrile neutropenia  Physical deconditioning  Palpitations     Past Surgical History:  Anesthesia cardioversion  Right wrist arthrodesis  Bone marrow aspirate & biopsy  Excise lesion upper extremity - left ar wound excision and closure, possible submuscular transposition of ulnar nerve  Foot surgery - 4 left, 2 right  Carpal tunnel bilateral release  Repair ventricular septal defect  Transpositional ulnar nerve (elbow)    Family History:   Mother - breast cancer, hypertension, alzheimer disease  Father - hypertension, lymphoma, circulatory A fib  Paternal grandmother - diabetes     Social History:   No smoking or tobacco use  No alcohol use     Physical Exam:   /78  Pulse 98  Temp 98.2  F (36.8  C) (Oral)  Ht 1.473 m (4' 10\")  Wt 55.2 kg (121 lb 11.2 oz)  SpO2 95%  BMI 25.44 kg/m2   Wt Readings from Last 4 Encounters:   11/01/18 55.2 kg (121 lb 11.2 oz)   10/04/18 55.2 kg (121 lb 12.8 oz)   09/26/18 55.7 kg (122 lb 14.4 oz)   08/15/18 53.8 kg (118 lb 8 oz)     Constitutional: Well-developed, appearing stated age; cooperative  Eyes: Normal EOM, PERRLA, vision, conjunctiva, sclera  ENT: Normal external ears, nose, hearing, lips, teeth, gums  Neck: No mass or thyroid enlargement  : Not tested  Lymph: No cervical, supraclavicular, or epitrochlear nodes  MS:   Right wrist is warm and swollen s/p fusion tender to palpation. Markedly limited ROM at baseline. Several deformities present in right and left fingers, worse in right. In particular, right 2nd and 3rd MCP joints with mild chronic " synoviis. Ulnar deviation bilaterally. Mild chronic synovitis at 1st MCP, 3rd PIP on right as well as left wrist. Trace in left 3rd PIP.  Skin: No nail pitting, alopecia, rash, nodules or lesions; + hair loss  Neuro: + chronic polyneuropathy with deminished sensation in BL LE.   Psych: Normal judgement, orientation, memory, affect.     Images:  Eye Exam (10/23/18)  Significant for mild H-lated nuclear cataracts.   Ocular CT was recommended for right eye.     CT Chest/Abdomen/Pelvis (9/26/18)  In this patient with a history of lymphoma:  1. No evidence of disease recurrence, consistent with complete  response per Lugano criteria.  2. Stable cardiomegaly. Improved pericardial effusion.  3. Early contrast phase with heterogeneous visceral enhancement in the  abdomen, likely secondary to cardiac dysfunction.    MRI Cardiac w/contrast (4/12/18)  Loculated exudative pericardial effusion that is beginning to organize, with diffuse  pericardial enhancement consistent with ongoing inflammation. Normal LV fxn, LVEF 54% and RVEF 49%. At  least moderate, possibly severe tricuspid regurgitation. Compared to MRI from 03/2018, effusion is largely  unchanged in size (maybe a bit smaller) but is starting to organize. No change in pericardial enhancement.    Laboratory:   Component      Latest Ref Rng & Units 10/4/2018   HPV Source       SurePath   HPV 16 DNA      NEG:Negative Negative   HPV 18 DNA      NEG:Negative Negative   Other HR HPV      NEG:Negative Negative   Final Diagnosis       This patient's sample is negative for HPV DNA.   Specimen Description       Cervical Cells   PAP       NIL   Copath Report       Patient Name: FIDELIA MIDDLETON .   Digoxin Level      0.5 - 2.0 ug/L 0.6   Hemoglobin A1C      0 - 5.6 % 5.3   Vitamin D Deficiency screening      20 - 75 ug/L 48            Scribe Disclosure:   Harpal CHAVEZ, am serving as a scribe to document services personally performed by Nayeli Pritchett MD at this visit, based  upon the provider's statements to me. All documentation has been reviewed by the aforementioned provider prior to being entered into the official medical record.     Portions of this medical record were completed by a scribe. UPON MY REVIEW AND AUTHENTICATION BY ELECTRONIC SIGNATURE, this confirms (a) I performed the applicable clinical services, and (b) the record is accurate.    Attending Attestation:    I reviewed and edited the above scribed note to reflect my findings and plan.     I discussed the findings and recommendations with the patient.  Recommendations were discussed with the consulting team.  Time spent: 30 minutes    Nayeli Pritchett MD, MS  Rheumatology Attending Physician  [facility] [phone]

## 2018-11-04 DIAGNOSIS — I50.33 ACUTE ON CHRONIC DIASTOLIC HEART FAILURE (H): ICD-10-CM

## 2018-11-06 RX ORDER — SPIRONOLACTONE 25 MG/1
TABLET ORAL
Qty: 90 TABLET | Refills: 2 | Status: SHIPPED | OUTPATIENT
Start: 2018-11-06 | End: 2019-07-29

## 2018-11-16 ENCOUNTER — HOSPITAL ENCOUNTER (OUTPATIENT)
Dept: BONE DENSITY | Facility: CLINIC | Age: 58
Discharge: HOME | End: 2018-11-16
Attending: INTERNAL MEDICINE | Admitting: INTERNAL MEDICINE
Payer: COMMERCIAL

## 2018-11-16 DIAGNOSIS — M05.79 RHEUMATOID ARTHRITIS INVOLVING MULTIPLE SITES WITH POSITIVE RHEUMATOID FACTOR (H): ICD-10-CM

## 2018-11-16 PROCEDURE — 77080 DXA BONE DENSITY AXIAL: CPT

## 2018-11-30 ENCOUNTER — TELEPHONE (OUTPATIENT)
Dept: RHEUMATOLOGY | Facility: CLINIC | Age: 58
End: 2018-11-30

## 2018-11-30 NOTE — TELEPHONE ENCOUNTER
----- Message from Nayeli Pritchett MD sent at 11/21/2018  5:16 PM CST -----  Based on her DEXA results, Amelia should start Alendronate to prevent bone fractures. Please call her to discuss and send information. I'm happy to sign scrypt if she is in agreement.

## 2018-12-12 ENCOUNTER — MYC MEDICAL ADVICE (OUTPATIENT)
Dept: FAMILY MEDICINE | Facility: CLINIC | Age: 58
End: 2018-12-12

## 2019-01-02 RX ORDER — ALENDRONATE SODIUM 70 MG/1
70 TABLET ORAL
Qty: 12 TABLET | Refills: 3 | Status: CANCELLED | OUTPATIENT
Start: 2019-01-02 | End: 2020-01-02

## 2019-01-02 NOTE — TELEPHONE ENCOUNTER
Called and spoke with pt, she has been on alendronate before, she stopped taking it as she had not gotten a satisfactory answer about how it was helping her.  Discussed reasoning, was able to explain it to her in a way that made sense to her.  She would like to start taking it again.  She is going to discuss with her , which pharmacy she should have it sent to.  She will then call back.    Lillian Baer RN  Rheumatology Clinic

## 2019-01-07 DIAGNOSIS — M05.79 RHEUMATOID ARTHRITIS INVOLVING MULTIPLE SITES WITH POSITIVE RHEUMATOID FACTOR (H): ICD-10-CM

## 2019-01-07 DIAGNOSIS — M81.0 OSTEOPOROSIS, UNSPECIFIED OSTEOPOROSIS TYPE, UNSPECIFIED PATHOLOGICAL FRACTURE PRESENCE: Primary | ICD-10-CM

## 2019-01-07 DIAGNOSIS — M05.741 RHEUMATOID ARTHRITIS INVOLVING BOTH HANDS WITH POSITIVE RHEUMATOID FACTOR (H): ICD-10-CM

## 2019-01-07 DIAGNOSIS — M05.742 RHEUMATOID ARTHRITIS INVOLVING BOTH HANDS WITH POSITIVE RHEUMATOID FACTOR (H): ICD-10-CM

## 2019-01-07 RX ORDER — PREDNISONE 5 MG/1
5 TABLET ORAL DAILY
Qty: 90 TABLET | Refills: 0 | Status: SHIPPED | OUTPATIENT
Start: 2019-01-07 | End: 2019-03-26

## 2019-01-07 RX ORDER — HYDROXYCHLOROQUINE SULFATE 200 MG/1
200 TABLET, FILM COATED ORAL 2 TIMES DAILY
Qty: 180 TABLET | Refills: 0 | Status: SHIPPED | OUTPATIENT
Start: 2019-01-07 | End: 2019-04-04

## 2019-01-07 NOTE — TELEPHONE ENCOUNTER
Last Clinic Visit: 11/1/2018 [facility] Rheumatology  Prednisone refill 90 day.  Next Clinic Visit: 2-28-19

## 2019-01-07 NOTE — TELEPHONE ENCOUNTER
[facility] Call Center    Phone Message    May a detailed message be left on voicemail: yes    Reason for Call: Medication Question or concern regarding medication   Prescription Clarification  Name of Medication: Fosamax   Prescribing Provider: Yarely ELI   Pharmacy: [facility] in [facility] - Phone: [phone]   What on the order needs clarification? Pt states she would like her RX's sent there from now on, please advise. Per pt req. Requesting a call from Lillian POTTER.     Action Taken: Message routed to:  [facility] ([facility] RHEUM

## 2019-01-07 NOTE — TELEPHONE ENCOUNTER
Last Clinic Visit: 11/1/2018 [facility] Rheumatology  Last Eye Exam: 10-22-18  Next clinic visit: 2-28-19  Plaquenil refill: 90 day

## 2019-01-11 RX ORDER — ALENDRONATE SODIUM 70 MG/1
70 TABLET ORAL
Qty: 12 TABLET | Refills: 3 | Status: SHIPPED | OUTPATIENT
Start: 2019-01-11 | End: 2019-12-08

## 2019-01-16 ENCOUNTER — MYC MEDICAL ADVICE (OUTPATIENT)
Dept: CARDIOLOGY | Facility: CLINIC | Age: 59
End: 2019-01-16

## 2019-01-16 DIAGNOSIS — I48.92 ATRIAL FLUTTER, UNSPECIFIED TYPE (H): ICD-10-CM

## 2019-01-16 RX ORDER — DIGOXIN 125 MCG
125 TABLET ORAL DAILY
Qty: 90 TABLET | Refills: 3 | Status: SHIPPED | OUTPATIENT
Start: 2019-01-16 | End: 2020-01-10

## 2019-01-23 NOTE — TELEPHONE ENCOUNTER
Called and spoke with pt, she has taken 2 doses of the medication at this time.  She has no further questions at this time.    Lillian Baer RN  Rheumatology Clinic

## 2019-02-07 NOTE — PROGRESS NOTES
Palliative Care Inpatient Clinical Social Work Visit    Patient Information:  Bree is a 56 year old woman with a complex medical history, including cardiac issues and now with a recent diagnosis of Diffuse large B cell Lymphoma.  I am following from the Palliative Consult Service for coping with illness and anxiety issues.  I met with Bree and  Wolf in Bree's room this afternoon.    Relevant Symptoms/Concerns  - New information since last visit   Physical:   Bree has some concerns that when she transfers to the Acute Rehabilitation Unit they may expect too much of her based on her abilities during her earlier admission there.   Psychological/Emotional/Existential:  Some worry about how things will go when she eventually returns home (mostly related to practical issues).   Family/Social/Caregiver:   Awareness by Bree and Wolf of the need for some role shift, given Bree's limitations.     Developmental:      Mental Health:      End of Life:      Cultural/Voodoo/Spiritual:      Grief/Loss:  Continued grief for both Bree and Wolf re the many losses associated with Bree's health issues.   Concurrent Stressors:        Comments:  Discussed in some detail Bree's and Wolf's usual styles and how that leads to conflict as well as balance.  Discussed need to open communication and balance for each of them individually as well as for them as a couple.    Strengths - New information since last visit    Physical: Bree reports feeling stronger.   Psychological/Emotional/Existential:  Bree and Wolf are both open to discussion of issues, receptive to psychoeducation.   Family/Social/Caregiver:      Developmental:      Mental Health:      End of Life:      Cultural/Voodoo/Spiritual:      Grief/Loss:         Comments:       Goals/Decision Making/Advance Care Planning - New information since last visit   Preferences:  Restorative   Concerns:      Documents:      Decision Making Issues:        Comments:       Resource  Needs     Discharge Planning:  Per Unit/Program  and/or Care Coordinator   Other:      Comments:       Intervention (Check all that apply)    X   Assessment of palliative specific issues          Introduction of Palliative clinical social work interventions    X   Adjustment to illness counseling        Advanced care planning        Attended/participated in care conference        Behavioral interventions for symptom management    X   Facilitation of processing of thoughts/feelings    X   Family communication facilitated    X   Grief counseling        Goals of care discussion/facilitation        Life legacy work        Life review facilitation    X   Psychoeducation    X   Re-framing        Resource referral            Other     Comments:       Plan:  Bree expects to transfer to the Acute Rehabilitation Unit likely tomorrow.  At that point, counseling will be provided by Edilma Miguel, PhD, LP.     PACU PACU to home

## 2019-02-20 ASSESSMENT — ENCOUNTER SYMPTOMS
MUSCLE WEAKNESS: 1
MUSCLE CRAMPS: 1
NUMBNESS: 1
WEAKNESS: 1
STIFFNESS: 1
TINGLING: 1
DISTURBANCES IN COORDINATION: 1

## 2019-02-22 ENCOUNTER — OFFICE VISIT (OUTPATIENT)
Dept: CARDIOLOGY | Facility: CLINIC | Age: 59
End: 2019-02-22
Attending: INTERNAL MEDICINE
Payer: COMMERCIAL

## 2019-02-22 VITALS
OXYGEN SATURATION: 98 % | HEART RATE: 98 BPM | BODY MASS INDEX: 26.57 KG/M2 | HEIGHT: 58 IN | SYSTOLIC BLOOD PRESSURE: 118 MMHG | DIASTOLIC BLOOD PRESSURE: 83 MMHG | WEIGHT: 126.6 LBS

## 2019-02-22 DIAGNOSIS — Q21.0 VSD (VENTRICULAR SEPTAL DEFECT): ICD-10-CM

## 2019-02-22 DIAGNOSIS — I48.0 PAROXYSMAL ATRIAL FIBRILLATION (H): ICD-10-CM

## 2019-02-22 DIAGNOSIS — Z86.79 HISTORY OF PERICARDITIS: ICD-10-CM

## 2019-02-22 DIAGNOSIS — I47.19 ATRIAL TACHYCARDIA (H): Primary | ICD-10-CM

## 2019-02-22 DIAGNOSIS — I48.92 ATRIAL FLUTTER, UNSPECIFIED TYPE (H): ICD-10-CM

## 2019-02-22 PROCEDURE — G0463 HOSPITAL OUTPT CLINIC VISIT: HCPCS | Mod: 25,ZF

## 2019-02-22 PROCEDURE — 93005 ELECTROCARDIOGRAM TRACING: CPT

## 2019-02-22 PROCEDURE — 99214 OFFICE O/P EST MOD 30 MIN: CPT | Mod: 25 | Performed by: INTERNAL MEDICINE

## 2019-02-22 ASSESSMENT — MIFFLIN-ST. JEOR: SCORE: 1044

## 2019-02-22 ASSESSMENT — PAIN SCALES - GENERAL: PAINLEVEL: NO PAIN (0)

## 2019-02-22 NOTE — PATIENT INSTRUCTIONS
"You were seen today in the Adult Congenital and Cardiovascular Genetics Clinic at the [facility].    Cardiology Providers you saw during your visit:  Dr. Juan Eaton    Diagnosis:  VSD and atrial tach    Results:  No testing today    Recommendations:    1.  Continue to eat a heart healthy, low salt diet.  2.  Continue to get 20-30 minutes of aerobic activity, 4-5 days per week.  Examples of aerobic activity include walking, running, swimming, cycling, etc.  3.  Continue to observe good oral hygiene, with regular dental visits.  4.  We refilled your Eliquis.  5. Please have a digoxin level done in March       Vitals:    02/22/19 0959   BP: 118/83   BP Location: Right arm   Patient Position: Chair   Cuff Size: Adult Regular   Pulse: 98   SpO2: 98%   Weight: 57.4 kg (126 lb 9.6 oz)   Height: 1.473 m (4' 10\")       Follow-up:  Follow up with Dr. Eaton in 1 year.     If you have questions or concerns please contact us at:    Eugene Hernandez RN, BSN   Aldair Case (Scheduling)  Nurse Coordinator     Clinic   Adult Congenital and CV Genetics Adult Congenital and CV Genetic  [facility]  (P)[phone]    (P) [phone]  [email or web address] (F)[phone]        For after hours urgent needs, call [phone] and ask to speak to the Adult Congenital Physician on call.  Mention Job Code 0401.    For emergencies call 911.    [facility]   [facility]  Mail Code [id number]  [address] [place] [address]  [phone]   "

## 2019-02-22 NOTE — PROGRESS NOTES
Electrophysiology Clinic Note    HPI:   Ms. Michel is a 58 year old female who has a past medical history significant for VSD s/p repair 1969, RA, HTN, insomnia, atrial tachyarrhythmias, cardiac arrest, DLBCL, and exudative pericardial effusion.     She was admitted from 5/15/17-5/23/17 with atrial tachyarrhythmias (SVT, AF, AFL) with symptoms of palpitations, fatigue, and nausea. An echo showed LVEF 40-45%, mildly reduced RVEF, moderate biatrial enlargement, mild mitral regurgitation, and moderate tricuspid regurgitation. . She was started on Eliquis and underwent a BRE/DCCV on 5/17/17 c/b hypotension and recurrent arrhythmia.She was started on Sotalol and chemically cardioverted. However, she had nausea and thought it was from the Sotalol so this was stopped. She reverted back to AF/AFL and a rate control strategy was adopted which was difficult given symptoms so she started amiodarone. CMRI  showed LVEF 55%, mildly dilated RV with focal area of dyskinesis in RVOT, severe bi-atrial enlargement, severe TR, mild/moderate MR, and DE at RV septal insertion site. RFA was discussed but was defered as patient had a UTI at the time with ESBL.    She was readmitted on 6/19/17-8/4/17 after suffering an out of hospital cardiac arrest.  Upon EMS arrival, she was in VF. She was externally defibrillated and had ROSC in the field. She was intubated and brought to [facility] found to be in escape rhythm 43 bpm. She was taken emergently to cath lab where coronary angiogram showed normal coronary arteries. Temporary pacemaker was placed in RA given sinus arrest. She was also placed on therapeutic hypothermia. She had been having nausea, diarrhea, and intermittent vomiting over the last week and generally had not been feeling well over the last month and also had saddle anesthesia with lower extremity numbness that had been evaluated by neuro as an outpatient.  Ultimately found to have DLBCL started on chemo cycles. 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 Lymphoma and was eventually discharged to TCU on a lifevest.      Her DLBCL was treated with R-EPOCH for one cycle while in the hospital complicated by cytopenias followed by 5 cycles of R-CHOP finished the cycles in December of 2017 currently on surveillance scans. She had a pericardial effusion for which she is on colchicine which was discontinued at the end of 6/2018.     She has then followed intermittently with us. She had some recurrent AF in 4/2018 requiring ED visit with DCCV with recurrence and another DCCV. We restarted digoxin 4/4/18 after which she had one episode of AF s/p DCCV and has since maintained sinus rhythm and reported good symptomatic control. She was seen in 5/2018 and reported having a feeling of her heart \"rolling\" daily lasting about about 10 sec at the most. The last time she required DCCV was in 4/12/2018. Her cMRI had shown some organization of her [ericardial effusion) and she has been initiated on colchicine. Chest CT in 9/2018 showed no evidence of lymphoma recurrence and improved pericardial effusion.     She presents today for follow up. She reports feeling well. She denies any chest pain/pressures, dizziness, lightheadedness, worsening shortness of breath, leg/ankle swelling, PND, orthopnea, palpitations, or syncopal symptoms. Presenting 12 lead ECG shows NSR Vent Rate 95 bpm,  ms, QRS 84 ms, QTc 419 ms. Current cardiac medications include: Elqiuis, Atenolol, Digoxin, Lasix, and Spironolactone.     PAST MEDICAL HISTORY:  Past Medical History:   Diagnosis Date     Antiplatelet or antithrombotic long-term use      Arrhythmia      Arthritis      Cancer (H)     lymphoma     Cardiac arrest (H)      History of chemotherapy      Hypertension      Lymphoma (H)      Neuropathy      PONV (postoperative nausea and vomiting)      Rheumatoid arthritis(714.0)        CURRENT MEDICATIONS:  Current Outpatient Medications "   Medication Sig Dispense Refill     acetaminophen (TYLENOL) 325 MG tablet Take 2 tablets (650 mg) by mouth every 4 hours as needed for mild pain, fever or headaches       alendronate (FOSAMAX) 70 MG tablet Take 1 tablet (70 mg) by mouth every 7 days 12 tablet 3     apixaban ANTICOAGULANT (ELIQUIS) 5 MG tablet Take 1 tablet (5 mg) by mouth 2 times daily 180 tablet 3     ascorbic acid 500 MG TABS Take 1 tablet (500 mg) by mouth daily 30 tablet 0     atenolol (TENORMIN) 25 MG tablet Take 1 tablet (25 mg) by mouth 2 times daily 180 tablet 3     calcium polycarbophil (FIBERCON) 625 MG tablet Take 1 tablet by mouth daily        calcium-vitamin D (CALTRATE) 600-400 MG-UNIT per tablet Take 2 tablets by mouth every morning (Patient taking differently: Take 1 tablet by mouth every morning ) 60 tablet 0     digoxin (LANOXIN) 125 MCG tablet Take 1 tablet (125 mcg) by mouth daily 90 tablet 3     furosemide (LASIX) 20 MG tablet Take 1 tablet (20 mg) by mouth daily 90 tablet 3     gabapentin (NEURONTIN) 100 MG capsule Take 2 capsules (200 mg) by mouth 3 times daily 180 capsule 3     gabapentin 8 % GEL topical PLO cream Apply thin layer to area of neuropathy 100 g 3     gabapentin POWD        hydrocortisone (CORTEF) 5 MG tablet Take 5 tablets (25 mg) by mouth daily for 1 day 5 tablet 0     HYDROCORTISONE PO Take 100 mg by mouth IV       hydroxychloroquine (PLAQUENIL) 200 MG tablet Take 1 tablet (200 mg) by mouth 2 times daily 180 tablet 0     multivitamin, therapeutic with minerals (THERA-VIT-M) TABS tablet Take 1 tablet by mouth daily 30 each 0     order for DME Equipment being ordered: Thigh high compression stockings Class 2: off the shelf or custom, farrow foot wrap and juxta fit premium lower leg wrap, lymphedema bandaging supplies 3 sets 1 Units 1     order for DME Equipment being ordered: BP cuff 1 Device 1     predniSONE (DELTASONE) 5 MG tablet Take 5 mg by mouth daily. 90 tablet 0     predniSONE (DELTASONE) 5 MG tablet  Take 1 tablet (5 mg) by mouth daily 90 tablet 2     spironolactone (ALDACTONE) 25 MG tablet TAKE ONE TABLET BY MOUTH EVERY DAY 90 tablet 2     tolterodine (DETROL LA) 4 MG 24 hr capsule Take 1 capsule (4 mg) by mouth daily 30 capsule 11       PAST SURGICAL HISTORY:  Past Surgical History:   Procedure Laterality Date     ANESTHESIA CARDIOVERSION N/A 5/17/2017    Procedure: ANESTHESIA CARDIOVERSION;  ANESTHESIA CARDIOVERSION;  Surgeon: GENERIC ANESTHESIA PROVIDER;  Location: [facility] OR     ANESTHESIA CARDIOVERSION  3/12/2018    Procedure: ANESTHESIA CARDIOVERSION;;  Surgeon: GENERIC ANESTHESIA PROVIDER;  Location: [facility] OR     ANESTHESIA CARDIOVERSION N/A 3/23/2018    Procedure: ANESTHESIA CARDIOVERSION;  Anesthesia Cardioversion ;  Surgeon: GENERIC ANESTHESIA PROVIDER;  Location: [facility] OR     ANESTHESIA CARDIOVERSION N/A 4/12/2018    Procedure: ANESTHESIA CARDIOVERSION;  Cardioversion;  Surgeon: GENERIC ANESTHESIA PROVIDER;  Location: [facility] OR     ARTHRODESIS WRIST  2000    Right wrist     BONE MARROW ASPIRATE &BIOPSY  7/12/2017          EXCISE LESION UPPER EXTREMITY Left 5/22/2018    Procedure: EXCISE LESION UPPER EXTREMITY;  Left Arm Wound Excision And Closure, Possible Submuscular Transposition of Ulnar Nerve  (Choice Anesthesia);  Surgeon: Kevin Sheldon MD;  Location: [facility] OR     FOOT SURGERY      4 left and 2 right     RELEASE CARPAL TUNNEL BILATERAL       REPAIR VENTRICULAR SEPTAL DEFECT  1969     TRANSPOSITION ULNAR NERVE (ELBOW) Left 5/22/2018    Procedure: TRANSPOSITION ULNAR NERVE (ELBOW);;  Surgeon: Kevin Sheldon MD;  Location: [facility] OR       ALLERGIES:     Allergies   Allergen Reactions     Blood Transfusion Related (Informational Only) Hives     Hives with platelets. Give benadryl premedication.     Metoprolol Other (See Comments)     Pt and  report that metoprolol does not work for her and also reports feeling unwell with this medication. She has been able to tolerate atenolol, which as worked in controlling her  "HR.      No Clinical Screening - See Comments      Penicillin Allergy Skin Test not performed, see antimicrobial management team progress note 7/5/17.       Penicillins      Tape [Adhesive Tape] Rash       FAMILY HISTORY:  Family History   Problem Relation Age of Onset     Breast Cancer Mother      Hypertension Mother      Alzheimer Disease Mother      Hypertension Father      Blood Disease Father         Lymphoa     Circulatory Father         A Fib     Diabetes Paternal Grandmother        SOCIAL HISTORY:  Social History     Tobacco Use     Smoking status: Never Smoker     Smokeless tobacco: Never Used   Substance Use Topics     Alcohol use: Yes     Comment: none     Drug use: No       ROS:   A comprehensive 10 point review of systems negative other than as mentioned in HPI.    Exam:  /83 (BP Location: Right arm, Patient Position: Chair, Cuff Size: Adult Regular)   Pulse 98   Ht 1.473 m (4' 10\")   Wt 57.4 kg (126 lb 9.6 oz)   SpO2 98%   BMI 26.46 kg/m    GENERAL APPEARANCE: alert and no distress  HEENT: alopecia, MMM, PERRLA.   NECK:supple.   RESPIRATORY: lungs clear to auscultation - no rales, rhonchi or wheezes, no use of accessory muscles, no retractions, respirations are unlabored, normal respiratory rate  CARDIOVASCULAR: normal S1 with physiologic split S2, holosystolic murmur   ABDOMEN: soft, non tender,bowel sounds normal  EXTREMITIES: peripheral pulses normal, trace pedal edema, LUE wound with dressing CDI.  NEURO: alert and oriented to person/place/time, normal speech, gait and affect  SKIN: pale, fragile, no rashes    Labs:  Reviewed.     Testing/Procedures:  cMRI 4/12/18  58 yo female with remote h/o VSD repair, atrial tachy-arrhythmias, cardiac arrest,  lymphoma in remission, and pericarditis seen on recent MRI.     1. The LV is normal in cavity size and wall thickness. The global systolic function is normal. The LVEF is  54%. There are no regional wall motion abnormalities.     2. The RV is " mildly dilated in cavity size. The global systolic function is normal. The RVEF is 49%.      3. Both atria are severely enlarged.     4. There is at least moderate, possibly severe tricuspid regurgitation.      5. Late gadolinium enhancement imaging shows hyperenhancement in the RV insertion site consistent with RV  pressure/volume overload.      6. There is a loculated pericardial effusion along the basal lateral and basal to mid inferior LV walls,  and along the inferior RV wall. it appears exudative in nature, and is beginning to organize. There is  diffuse pericardial enhancement.     7. There is no intracardiac thrombus or mass.     8. There is a small right pleural effusion.     CONCLUSIONS:  Loculated exudative pericardial effusion that is beginning to organize, with diffuse  pericardial enhancement consistent with ongoing inflammation. Normal LV fxn, LVEF 54% and RVEF 49%. At  least moderate, possibly severe tricuspid regurgitation. Compared to MRI from 03/2018, effusion is largely  unchanged in size (maybe a bit smaller) but is starting to organize. No change in pericardial enhancement  9/11/17 ECHOCARDIOGRAM:   Interpretation Summary  Left ventricular function, chamber size, wall motion, and wall thickness are  normal.The EF is 60-65% (traced 60%.) GLS -18%.  The right ventricle is normal size and normal in function. The RV is mildly  dilated.  Moderate tricuspid insufficiency is present.  Mild pulmonary hypertension is present (esimated PASP 55 mmHg including RA  pressure.)  Dilation of the inferior vena cava is present with abnormal respiratory  variation in diameter (esitimated RAP 15 mmHg.)  This study was compared with the study from 8/31/17: TR appears slightly  decreased from the earlier study.    7/24/17 CMRI:     1. The LV is normal in cavity size and wall thickness. The global systolic function is normal. The LVEF is  64%. There are no regional wall motion abnormalities. No evidence of myocardial  iron overload.      2. The RV is normal in cavity size. The global systolic function is normal. The RVEF is 59%.      3. There is moderate dilation of bilateral atria.      4. Tricuspid regurgitation is present, difficult to quantify due to image quality.      5. Late gadolinium enhancement imaging shows RV insertion site hyperenhancement, a non-specific finding  seen in patients with RV volume/pressure overload.      6. There is a moderate right pleural effusion and small left pleural effusion.      CONCLUSIONS: Normal Bi-Ventricular systolic function, LVEF 64% and RVE 59%. There is no evidence of  infiltrative disease. Compared to the MRI from 5/15/2017, there is no significant change.     7/2017 BRE:  Interpretation Summary  There is a small mass (0.7 cm by 0.2 cm) on the ventricular side of the right  coronary cusp. There is a second mass (0.4 cm by 0.2 cm) observed in the LVOT,  attached to the mitro-aortic curtain. There is a third mass on the noncoronary  cusp that measures 0.4 cm by 0.2 cm that could be a healing vegetation. These  findings are compatible with endocarditis if clinical picture supports.  Right ventricular function, chamber size, wall motion, and thickness are  normal.  This study was compared with the study from 7/10/2017.  There is detection of masses on the aortic valve and LVOT.    Cardiac MRI - 4/12/18:  1. The LV is normal in cavity size and wall thickness. The global systolic function is normal. The LVEF is  54%. There are no regional wall motion abnormalities.     2. The RV is mildly dilated in cavity size. The global systolic function is normal. The RVEF is 49%.      3. Both atria are severely enlarged.     4. There is at least moderate, possibly severe tricuspid regurgitation.      5. Late gadolinium enhancement imaging shows hyperenhancement in the RV insertion site consistent with RV  pressure/volume overload.      6. There is a loculated pericardial effusion along the basal  lateral and basal to mid inferior LV walls,  and along the inferior RV wall. it appears exudative in nature, and is beginning to organize. There is  diffuse pericardial enhancement.     7. There is no intracardiac thrombus or mass.     8. There is a small right pleural effusion.    9/26/18 CHEST CT:                                                                   IMPRESSION: In this patient with a history of lymphoma:  1. No evidence of disease recurrence, consistent with complete  response per [place] criteria.  2. Stable cardiomegaly. Improved pericardial effusion.  3. Early contrast phase with heterogeneous visceral enhancement in the  abdomen, likely secondary to cardiac dysfunction.    Assessment and Plan:   Ms. Michel is a 58 year old female who has a past medical history significant for VSD s/p repair 1969, RA, HTN, insomnia, atrial tachyarrhythmias, cardiac arrest, DLBCL, and exudative pericardial effusion.  Chest CT in 9/2018 showed no evidence of lymphoma recurrence and improved pericardial effusion. She has been off colchicine. Plan for repeat CT in 3/2019.   Atrial Fibrillation:  -. Stroke Prophylaxis:  CHADSVASC=+HTN, +gender  2, corresponding to a 2.2% annual stroke / systemic emolism event rate. indicating need for long term oral anticoagulation.  She is on Eliquis. No bleeding issues.   2. Rate Control: Continue Atenolol and Digoxin.  Need digoxin level, will add to planned BMP in 3/2019.  3. Rhythm Control: Cardioversion, Antiarrhythmics and/or ablation are options for rhythm control. Since her last DCCV on 4/12 she has remained in NSR. Notably, she has possible side effects from Sotalol (however, likely was due to underlying illness at the time and not from medication since she was found to have DBCL).  If she continues to have frequent symptomatic episodes of AF/AFl, given her history of adverse reactions to AAT, she may be a candidate for ablation. Will not change treatment at this point as she  has had no recent recurrences.      Exudative pericardial effusion  - last noted in cMRI 4/12/18 beginning to organize with diffuse pericardial enhancement. Follow up chest CT showed improved pericardial effusion. Asymptomatic with no shortness of breath or signs or symptoms of constriction.   - she finished a course of colchicine in June  - she will be getting repeat chest CT in 3/2019    Follow up in 1 year.   The patient states understanding and is agreeable with the plan.     Juan Eaton MD [facility]  Cardiology - Electrophysiology

## 2019-02-22 NOTE — LETTER
2/22/2019      RE: Amelia Michel  [address]  [facility] [id number]       Dear Colleague,    Thank you for the opportunity to participate in the care of your patient, Amelia Michel, at the [facility] at [facility]. Please see a copy of my visit note below.    Electrophysiology Clinic Note    HPI:   Ms. Michel is a 58 year old female who has a past medical history significant for VSD s/p repair 1969, RA, HTN, insomnia, atrial tachyarrhythmias, cardiac arrest, DLBCL, and exudative pericardial effusion.     She was admitted from 5/15/17-5/23/17 with atrial tachyarrhythmias (SVT, AF, AFL) with symptoms of palpitations, fatigue, and nausea. An echo showed LVEF 40-45%, mildly reduced RVEF, moderate biatrial enlargement, mild mitral regurgitation, and moderate tricuspid regurgitation. . She was started on Eliquis and underwent a BRE/DCCV on 5/17/17 c/b hypotension and recurrent arrhythmia.She was started on Sotalol and chemically cardioverted. However, she had nausea and thought it was from the Sotalol so this was stopped. She reverted back to AF/AFL and a rate control strategy was adopted which was difficult given symptoms so she started amiodarone. CMRI  showed LVEF 55%, mildly dilated RV with focal area of dyskinesis in RVOT, severe bi-atrial enlargement, severe TR, mild/moderate MR, and DE at RV septal insertion site. RFA was discussed but was defered as patient had a UTI at the time with ESBL.    She was readmitted on 6/19/17-8/4/17 after suffering an out of hospital cardiac arrest.  Upon EMS arrival, she was in VF. She was externally defibrillated and had ROSC in the field. She was intubated and brought to [facility] found to be in escape rhythm 43 bpm. She was taken emergently to cath lab where coronary angiogram showed normal coronary arteries. Temporary pacemaker was placed in RA given sinus arrest. She was also placed on therapeutic hypothermia. She had been  "having nausea, diarrhea, and intermittent vomiting over the last week and generally had not been feeling well over the last month and also had saddle anesthesia with lower extremity numbness that had been evaluated by neuro as an outpatient.  Ultimately found to have DLBCL started on chemo cycles. 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      Her DLBCL was treated with R-EPOCH for one cycle while in the hospital complicated by cytopenias followed by 5 cycles of R-CHOP finished the cycles in December of 2017 currently on surveillance scans. She had a pericardial effusion for which she is on colchicine which was discontinued at the end of 6/2018.     She has then followed intermittently with us. She had some recurrent AF in 4/2018 requiring ED visit with DCCV with recurrence and another DCCV. We restarted digoxin 4/4/18 after which she had one episode of AF s/p DCCV and has since maintained sinus rhythm and reported good symptomatic control. She was seen in 5/2018 and reported having a feeling of her heart \"rolling\" daily lasting about about 10 sec at the most. The last time she required DCCV was in 4/12/2018. Her cMRI had shown some organization of her [ericardial effusion) and she has been initiated on colchicine. Chest CT in 9/2018 showed no evidence of lymphoma recurrence and improved pericardial effusion.     She presents today for follow up. She reports feeling well. She denies any chest pain/pressures, dizziness, lightheadedness, worsening shortness of breath, leg/ankle swelling, PND, orthopnea, palpitations, or syncopal symptoms. Presenting 12 lead ECG shows NSR Vent Rate 95 bpm,  ms, QRS 84 ms, QTc 419 ms. Current cardiac medications include: Elqiuis, Atenolol, Digoxin, Lasix, and Spironolactone.     PAST MEDICAL HISTORY:  Past Medical History:   Diagnosis Date     Antiplatelet or " antithrombotic long-term use      Arrhythmia      Arthritis      Cancer (H)     lymphoma     Cardiac arrest (H)      History of chemotherapy      Hypertension      Lymphoma (H)      Neuropathy      PONV (postoperative nausea and vomiting)      Rheumatoid arthritis(714.0)        CURRENT MEDICATIONS:  Current Outpatient Medications   Medication Sig Dispense Refill     acetaminophen (TYLENOL) 325 MG tablet Take 2 tablets (650 mg) by mouth every 4 hours as needed for mild pain, fever or headaches       alendronate (FOSAMAX) 70 MG tablet Take 1 tablet (70 mg) by mouth every 7 days 12 tablet 3     apixaban ANTICOAGULANT (ELIQUIS) 5 MG tablet Take 1 tablet (5 mg) by mouth 2 times daily 180 tablet 3     ascorbic acid 500 MG TABS Take 1 tablet (500 mg) by mouth daily 30 tablet 0     atenolol (TENORMIN) 25 MG tablet Take 1 tablet (25 mg) by mouth 2 times daily 180 tablet 3     calcium polycarbophil (FIBERCON) 625 MG tablet Take 1 tablet by mouth daily        calcium-vitamin D (CALTRATE) 600-400 MG-UNIT per tablet Take 2 tablets by mouth every morning (Patient taking differently: Take 1 tablet by mouth every morning ) 60 tablet 0     digoxin (LANOXIN) 125 MCG tablet Take 1 tablet (125 mcg) by mouth daily 90 tablet 3     furosemide (LASIX) 20 MG tablet Take 1 tablet (20 mg) by mouth daily 90 tablet 3     gabapentin (NEURONTIN) 100 MG capsule Take 2 capsules (200 mg) by mouth 3 times daily 180 capsule 3     gabapentin 8 % GEL topical PLO cream Apply thin layer to area of neuropathy 100 g 3     gabapentin POWD        hydrocortisone (CORTEF) 5 MG tablet Take 5 tablets (25 mg) by mouth daily for 1 day 5 tablet 0     HYDROCORTISONE PO Take 100 mg by mouth IV       hydroxychloroquine (PLAQUENIL) 200 MG tablet Take 1 tablet (200 mg) by mouth 2 times daily 180 tablet 0     multivitamin, therapeutic with minerals (THERA-VIT-M) TABS tablet Take 1 tablet by mouth daily 30 each 0     order for DME Equipment being ordered: Thigh high  compression stockings Class 2: off the shelf or custom, farrow foot wrap and juxta fit premium lower leg wrap, lymphedema bandaging supplies 3 sets 1 Units 1     order for DME Equipment being ordered: BP cuff 1 Device 1     predniSONE (DELTASONE) 5 MG tablet Take 5 mg by mouth daily. 90 tablet 0     predniSONE (DELTASONE) 5 MG tablet Take 1 tablet (5 mg) by mouth daily 90 tablet 2     spironolactone (ALDACTONE) 25 MG tablet TAKE ONE TABLET BY MOUTH EVERY DAY 90 tablet 2     tolterodine (DETROL LA) 4 MG 24 hr capsule Take 1 capsule (4 mg) by mouth daily 30 capsule 11       PAST SURGICAL HISTORY:  Past Surgical History:   Procedure Laterality Date     ANESTHESIA CARDIOVERSION N/A 5/17/2017    Procedure: ANESTHESIA CARDIOVERSION;  ANESTHESIA CARDIOVERSION;  Surgeon: GENERIC ANESTHESIA PROVIDER;  Location: [facility] OR     ANESTHESIA CARDIOVERSION  3/12/2018    Procedure: ANESTHESIA CARDIOVERSION;;  Surgeon: GENERIC ANESTHESIA PROVIDER;  Location: [facility] OR     ANESTHESIA CARDIOVERSION N/A 3/23/2018    Procedure: ANESTHESIA CARDIOVERSION;  Anesthesia Cardioversion ;  Surgeon: GENERIC ANESTHESIA PROVIDER;  Location: [facility] OR     ANESTHESIA CARDIOVERSION N/A 4/12/2018    Procedure: ANESTHESIA CARDIOVERSION;  Cardioversion;  Surgeon: GENERIC ANESTHESIA PROVIDER;  Location: [facility] OR     ARTHRODESIS WRIST  2000    Right wrist     BONE MARROW ASPIRATE &BIOPSY  7/12/2017          EXCISE LESION UPPER EXTREMITY Left 5/22/2018    Procedure: EXCISE LESION UPPER EXTREMITY;  Left Arm Wound Excision And Closure, Possible Submuscular Transposition of Ulnar Nerve  (Choice Anesthesia);  Surgeon: Kevin Sheldon MD;  Location: [facility] OR     FOOT SURGERY      4 left and 2 right     RELEASE CARPAL TUNNEL BILATERAL       REPAIR VENTRICULAR SEPTAL DEFECT  1969     TRANSPOSITION ULNAR NERVE (ELBOW) Left 5/22/2018    Procedure: TRANSPOSITION ULNAR NERVE (ELBOW);;  Surgeon: Kevin Sheldon MD;  Location: [facility] OR       ALLERGIES:     Allergies   Allergen Reactions      "Blood Transfusion Related (Informational Only) Hives     Hives with platelets. Give benadryl premedication.     Metoprolol Other (See Comments)     Pt and  report that metoprolol does not work for her and also reports feeling unwell with this medication. She has been able to tolerate atenolol, which as worked in controlling her HR.      No Clinical Screening - See Comments      Penicillin Allergy Skin Test not performed, see antimicrobial management team progress note 7/5/17.       Penicillins      Tape [Adhesive Tape] Rash       FAMILY HISTORY:  Family History   Problem Relation Age of Onset     Breast Cancer Mother      Hypertension Mother      Alzheimer Disease Mother      Hypertension Father      Blood Disease Father         Lymphoa     Circulatory Father         A Fib     Diabetes Paternal Grandmother        SOCIAL HISTORY:  Social History     Tobacco Use     Smoking status: Never Smoker     Smokeless tobacco: Never Used   Substance Use Topics     Alcohol use: Yes     Comment: none     Drug use: No       ROS:   A comprehensive 10 point review of systems negative other than as mentioned in HPI.    Exam:  /83 (BP Location: Right arm, Patient Position: Chair, Cuff Size: Adult Regular)   Pulse 98   Ht 1.473 m (4' 10\")   Wt 57.4 kg (126 lb 9.6 oz)   SpO2 98%   BMI 26.46 kg/m     GENERAL APPEARANCE: alert and no distress  HEENT: alopecia, MMM, PERRLA.   NECK:supple.   RESPIRATORY: lungs clear to auscultation - no rales, rhonchi or wheezes, no use of accessory muscles, no retractions, respirations are unlabored, normal respiratory rate  CARDIOVASCULAR: normal S1 with physiologic split S2, holosystolic murmur   ABDOMEN: soft, non tender,bowel sounds normal  EXTREMITIES: peripheral pulses normal, trace pedal edema, LUE wound with dressing CDI.  NEURO: alert and oriented to person/place/time, normal speech, gait and affect  SKIN: pale, fragile, no rashes    Labs:  Reviewed.     Testing/Procedures:  cMRI " 4/12/18  56 yo female with remote h/o VSD repair, atrial tachy-arrhythmias, cardiac arrest,  lymphoma in remission, and pericarditis seen on recent MRI.     1. The LV is normal in cavity size and wall thickness. The global systolic function is normal. The LVEF is  54%. There are no regional wall motion abnormalities.     2. The RV is mildly dilated in cavity size. The global systolic function is normal. The RVEF is 49%.      3. Both atria are severely enlarged.     4. There is at least moderate, possibly severe tricuspid regurgitation.      5. Late gadolinium enhancement imaging shows hyperenhancement in the RV insertion site consistent with RV  pressure/volume overload.      6. There is a loculated pericardial effusion along the basal lateral and basal to mid inferior LV walls,  and along the inferior RV wall. it appears exudative in nature, and is beginning to organize. There is  diffuse pericardial enhancement.     7. There is no intracardiac thrombus or mass.     8. There is a small right pleural effusion.     CONCLUSIONS:  Loculated exudative pericardial effusion that is beginning to organize, with diffuse  pericardial enhancement consistent with ongoing inflammation. Normal LV fxn, LVEF 54% and RVEF 49%. At  least moderate, possibly severe tricuspid regurgitation. Compared to MRI from 03/2018, effusion is largely  unchanged in size (maybe a bit smaller) but is starting to organize. No change in pericardial enhancement  9/11/17 ECHOCARDIOGRAM:   Interpretation Summary  Left ventricular function, chamber size, wall motion, and wall thickness are  normal.The EF is 60-65% (traced 60%.) GLS -18%.  The right ventricle is normal size and normal in function. The RV is mildly  dilated.  Moderate tricuspid insufficiency is present.  Mild pulmonary hypertension is present (esimated PASP 55 mmHg including RA  pressure.)  Dilation of the inferior vena cava is present with abnormal respiratory  variation in diameter  (esitimated RAP 15 mmHg.)  This study was compared with the study from 8/31/17: TR appears slightly  decreased from the earlier study.    7/24/17 CMRI:     1. The LV is normal in cavity size and wall thickness. The global systolic function is normal. The LVEF is  64%. There are no regional wall motion abnormalities. No evidence of myocardial iron overload.      2. The RV is normal in cavity size. The global systolic function is normal. The RVEF is 59%.      3. There is moderate dilation of bilateral atria.      4. Tricuspid regurgitation is present, difficult to quantify due to image quality.      5. Late gadolinium enhancement imaging shows RV insertion site hyperenhancement, a non-specific finding  seen in patients with RV volume/pressure overload.      6. There is a moderate right pleural effusion and small left pleural effusion.      CONCLUSIONS: Normal Bi-Ventricular systolic function, LVEF 64% and RVE 59%. There is no evidence of  infiltrative disease. Compared to the MRI from 5/15/2017, there is no significant change.     7/2017 BRE:  Interpretation Summary  There is a small mass (0.7 cm by 0.2 cm) on the ventricular side of the right  coronary cusp. There is a second mass (0.4 cm by 0.2 cm) observed in the LVOT,  attached to the mitro-aortic curtain. There is a third mass on the noncoronary  cusp that measures 0.4 cm by 0.2 cm that could be a healing vegetation. These  findings are compatible with endocarditis if clinical picture supports.  Right ventricular function, chamber size, wall motion, and thickness are  normal.  This study was compared with the study from 7/10/2017.  There is detection of masses on the aortic valve and LVOT.    Cardiac MRI - 4/12/18:  1. The LV is normal in cavity size and wall thickness. The global systolic function is normal. The LVEF is  54%. There are no regional wall motion abnormalities.     2. The RV is mildly dilated in cavity size. The global systolic function is normal.  The RVEF is 49%.      3. Both atria are severely enlarged.     4. There is at least moderate, possibly severe tricuspid regurgitation.      5. Late gadolinium enhancement imaging shows hyperenhancement in the RV insertion site consistent with RV  pressure/volume overload.      6. There is a loculated pericardial effusion along the basal lateral and basal to mid inferior LV walls,  and along the inferior RV wall. it appears exudative in nature, and is beginning to organize. There is  diffuse pericardial enhancement.     7. There is no intracardiac thrombus or mass.     8. There is a small right pleural effusion.    9/26/18 CHEST CT:                                                                   IMPRESSION: In this patient with a history of lymphoma:  1. No evidence of disease recurrence, consistent with complete  response per Lugano criteria.  2. Stable cardiomegaly. Improved pericardial effusion.  3. Early contrast phase with heterogeneous visceral enhancement in the  abdomen, likely secondary to cardiac dysfunction.    Assessment and Plan:   Ms. Michel is a 58 year old female who has a past medical history significant for VSD s/p repair 1969, RA, HTN, insomnia, atrial tachyarrhythmias, cardiac arrest, DLBCL, and exudative pericardial effusion.  Chest CT in 9/2018 showed no evidence of lymphoma recurrence and improved pericardial effusion. She has been off colchicine. Plan for repeat CT in 3/2019.   Atrial Fibrillation:  -. Stroke Prophylaxis:  CHADSVASC=+HTN, +gender  2, corresponding to a 2.2% annual stroke / systemic emolism event rate. indicating need for long term oral anticoagulation.  She is on Eliquis. No bleeding issues.   2. Rate Control: Continue Atenolol and Digoxin.  Need digoxin level, will add to planned BMP in 3/2019.  3. Rhythm Control: Cardioversion, Antiarrhythmics and/or ablation are options for rhythm control. Since her last DCCV on 4/12 she has remained in NSR. Notably, she has possible side  effects from Sotalol (however, likely was due to underlying illness at the time and not from medication since she was found to have DBCL).  If she continues to have frequent symptomatic episodes of AF/AFl, given her history of adverse reactions to AAT, she may be a candidate for ablation. Will not change treatment at this point as she has had no recent recurrences.      Exudative pericardial effusion  - last noted in cMRI 4/12/18 beginning to organize with diffuse pericardial enhancement. Follow up chest CT showed improved pericardial effusion. Asymptomatic with no shortness of breath or signs or symptoms of constriction.   - she finished a course of colchicine in June  - she will be getting repeat chest CT in 3/2019    Follow up in 1 year.   The patient states understanding and is agreeable with the plan.       Juan Eaton MD [facility]  Cardiology - Electrophysiology

## 2019-02-22 NOTE — NURSING NOTE
Labs: Patient was given results of the laboratory testing obtained today. Patient was instructed to return for the next laboratory testing in 1 month . Patient demonstrated understanding of this information and agreed to call with further questions or concerns.   Med Reconcile: Reviewed and verified all current medications with the patient. The updated medication list was printed and given to the patient.  Return Appointment: Patient given instructions regarding scheduling next clinic visit. Patient demonstrated understanding of this information and agreed to call with further questions or concerns.  Patient stated she understood all health information given and agreed to call with further questions or concerns.     Eugene Hernandez RN, BSN  Cardiology Care Coordinator  [facility]  [email or web address]  [phone]

## 2019-02-25 LAB — INTERPRETATION ECG - MUSE: NORMAL

## 2019-02-28 ENCOUNTER — OFFICE VISIT (OUTPATIENT)
Dept: RHEUMATOLOGY | Facility: CLINIC | Age: 59
End: 2019-02-28
Attending: INTERNAL MEDICINE
Payer: COMMERCIAL

## 2019-02-28 VITALS
TEMPERATURE: 97.8 F | OXYGEN SATURATION: 100 % | HEART RATE: 91 BPM | SYSTOLIC BLOOD PRESSURE: 118 MMHG | BODY MASS INDEX: 28.7 KG/M2 | HEIGHT: 58 IN | WEIGHT: 136.7 LBS | DIASTOLIC BLOOD PRESSURE: 82 MMHG

## 2019-02-28 DIAGNOSIS — M05.711 RHEUMATOID ARTHRITIS INVOLVING BOTH SHOULDERS WITH POSITIVE RHEUMATOID FACTOR (H): Primary | ICD-10-CM

## 2019-02-28 DIAGNOSIS — G72.81 CRITICAL ILLNESS MYOPATHY: ICD-10-CM

## 2019-02-28 DIAGNOSIS — R39.15 URINARY URGENCY: ICD-10-CM

## 2019-02-28 DIAGNOSIS — N39.41 URGE INCONTINENCE: ICD-10-CM

## 2019-02-28 DIAGNOSIS — M05.712 RHEUMATOID ARTHRITIS INVOLVING BOTH SHOULDERS WITH POSITIVE RHEUMATOID FACTOR (H): Primary | ICD-10-CM

## 2019-02-28 PROCEDURE — G0463 HOSPITAL OUTPT CLINIC VISIT: HCPCS | Mod: ZF

## 2019-02-28 RX ORDER — GABAPENTIN 100 MG/1
200 CAPSULE ORAL 3 TIMES DAILY
Qty: 180 CAPSULE | Refills: 3 | Status: SHIPPED | OUTPATIENT
Start: 2019-02-28 | End: 2019-06-20

## 2019-02-28 RX ORDER — TOLTERODINE 4 MG/1
4 CAPSULE, EXTENDED RELEASE ORAL DAILY
Qty: 30 CAPSULE | Refills: 11 | Status: SHIPPED | OUTPATIENT
Start: 2019-02-28 | End: 2019-02-28

## 2019-02-28 RX ORDER — TOLTERODINE 4 MG/1
4 CAPSULE, EXTENDED RELEASE ORAL DAILY
Qty: 90 CAPSULE | Refills: 3 | Status: SHIPPED | OUTPATIENT
Start: 2019-02-28 | End: 2020-05-19

## 2019-02-28 ASSESSMENT — PAIN SCALES - GENERAL: PAINLEVEL: NO PAIN (0)

## 2019-02-28 ASSESSMENT — MIFFLIN-ST. JEOR: SCORE: 1089.82

## 2019-02-28 NOTE — PATIENT INSTRUCTIONS
RA well controlled, try shoulder exercises and possibly physical therapy, or call for a shoulder injection/short burst of prednisone if these don't help.    Follow up with nurse practitioner Frederic Gongora in 6 months

## 2019-02-28 NOTE — LETTER
2019      RE: Amelia Michel  [address]  [place] [address]       [facility]  Rheumatology Clinic  Nayeli Pritchett MD  2019     Name: Amelia Michel  MRN: [id number]  Age: 57 year old  : 1960  Referring provider: Comfort Sabillon    Assessment and Plan:  #Seropositive rheumatoid arthritis (, RF 31) with multiple joint disability including MTP fusion 1-5 bilaterally, partial right wrist fusion, subluxation and ulnar deformity of MCP's  #Diffuse large B-cell lymphoma status-post 1 cycle R-EPOCH, 6 cycles R-CHOP  #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   #Slowly healing wound in medial left antecubital fossa after traumatic IV  #Long-term corticosteroid use  #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 She also reports BL knee pain, both worse towards the end of the day. I suggest a trial of physical therapy at this point for likely osteoarthritis symptoms. Amelia knows also that she can either get a joint injection or a short burst of prednisone for symptom relief (per her preference). She is not interested in using an alternative (leflunomide or rituximab) for RA.     She is due for a lymphoma surveillance CT chest/abd/pelv and we will re-evaluate whether any shoulder effusion or other abnormality is present on this test.    # Osteoporosis - last DEXA . Received 6-7 years of alendronate in early . Restarted alendronate in 2019. This is likely safe after an extended drug holiday, vitamin D levels have also been normal. Continue vit D-Ca supplementation.    # Plaquenil eye exam 10/18 and OCT ok (other than cataracts) with a recommendation to return in 1 year          Follow-up: Return in about 6 months (around 8/28/2019) for Frederic Gongora.    Subjective:  Today, Amelia reports that she has had increased pain in her right shoulder, primarily losing rande of motion to extension. Notes that this has gotten worse after colchicine (for pericarditis) was stopped. She currently wears a brace on her feet to treat neuropathy, and uses a walker. She notes that these braces alter her gait, and as a result, has had some knee and low back pain, worse towards the end of the day.     HPI:   Amelia Michel is a 57 year old female with a history of rheumatoid arthritis  and multiple medical problems listed above. She has been treated concervatively with plaquenil and 5mg prednisone, and not interested in rituximab or other biologics. She has previously been treated with sulfasalazine and initially developed a rash which was initially thought to be a sulfa rash, but her rash resolved and did not reoccur after being placed back on sulfasalazine. She was also treated with Arava, but this discontinued during her cancer treatment. She feels that these drugs have had no effect on her symptoms.       Interval history 3/30/18  On 3/30/18, her rheumatoid arthritis appeared to be under good control. Her plan at this time was to continue prednisone 5mg daily and HCQ 200mg twice a day. She was told to have an eye exam and follow up in 3 months. It was recommended that she get a DEXA scan to evaluate whether bisphosphonate was indicated (had 7-8 years of Fosamax in early 2000's). Previous use of methotrexate has been linked to her development of a severe neuropathy in the digits of her upper and lower extremities. She has also previously used arava.    Interval history 5/31/18  The patient reports spraining her ankle prior to her cardiac arrest, which caused some neuropathy in her sprained foot. Therefore she is currently wearing an ankle brace and using a walker. However, her arthritis is doing fine  she reports that the colchicine she was recently prescribed has been very effective. She mentions that she is switching insurance companies and had an unexpected arm surgery, which has prevented her from getting an eye exam that she is aware of being overdue for. She was also laid off recently but was able to fill the majority of her medications and made the most of her previous insurance so she should be able to manage for a while. She has undergone multiple CT scans and is currently finished with chemotherapy. Lymphoma is in remission and her heart is doing fine. She is interested in the options she has for medications that manage her rheumatoid arthritis and her lymphoma. She has taken many medications including plaquenil, prednisone, arava, methotrexate, orencia, and rituximab, and has plenty of options to choose from if she wants to lower the cost of prescriptions or avoid unwanted side-effects. The plan at this time was to continue prednisone 5mg daily.     Review of Systems:   Pertinent items are noted in HPI or as below, remainder of complete ROS is negative.      No recent problems with hearing or vision. No swallowing problems.   No breathing difficulty, shortness of breath, coughing, or wheezing  No chest pain or palpitations  No heart burn, indigestion, abdominal pain, nausea, vomiting, diarrhea  No urination problems, no bloody, cloudy urine, no dysuria  No numbing, tingling, weakness  No headaches or confusion  No rashes. No easy bleeding or bruising.     Active Medications:     Current Outpatient Prescriptions:      acetaminophen (TYLENOL) 325 MG tablet, Take 2 tablets (650 mg) by mouth every 4 hours as needed for mild pain, fever or headaches, Disp: , Rfl:      apixaban ANTICOAGULANT (ELIQUIS) 5 MG tablet, Take 1 tablet (5 mg) by mouth 2 times daily, Disp: 180 tablet, Rfl: 3     ascorbic acid 500 MG TABS, Take 1 tablet (500 mg) by mouth daily, Disp: 30 tablet, Rfl: 0     atenolol (TENORMIN) 25 MG  tablet, Take 1 tablet (25 mg) by mouth 2 times daily, Disp: 180 tablet, Rfl: 3     calcium polycarbophil (FIBERCON) 625 MG tablet, Take 1 tablet by mouth 2 times daily, Disp: , Rfl:      calcium-vitamin D (CALTRATE) 600-400 MG-UNIT per tablet, Take 2 tablets by mouth every morning (Patient taking differently: Take 1 tablet by mouth every morning ), Disp: 60 tablet, Rfl: 0     digoxin (LANOXIN) 125 MCG tablet, Take 250mcg daily for 2 days, then decrease to 125mcg daily, Disp: 90 tablet, Rfl: 3     furosemide (LASIX) 20 MG tablet, Take 1 tablet (20 mg) by mouth daily, Disp: 90 tablet, Rfl: 3     gabapentin (NEURONTIN) 100 MG capsule, TAKE TWO CAPSULES BY MOUTH THREE TIMES A DAY, Disp: 180 capsule, Rfl: 2     gabapentin 8 % GEL topical PLO cream, Apply thin layer to area of neuropathy, Disp: 100 g, Rfl: 3     gabapentin POWD, , Disp: , Rfl:      HYDROCORTISONE PO, Take 100 mg by mouth IV, Disp: , Rfl:      hydroxychloroquine (PLAQUENIL) 200 MG tablet, Take 1 tablet (200 mg) by mouth 2 times daily, Disp: 180 tablet, Rfl: 0     multivitamin, therapeutic with minerals (THERA-VIT-M) TABS tablet, Take 1 tablet by mouth daily, Disp: 30 each, Rfl: 0     order for DME, Equipment being ordered: Thigh high compression stockings Class 2: off the shelf or custom, farrow foot wrap and juxta fit premium lower leg wrap, lymphedema bandaging supplies 3 sets, Disp: 1 Units, Rfl: 1     order for DME, Equipment being ordered: BP cuff, Disp: 1 Device, Rfl: 1     predniSONE (DELTASONE) 5 MG tablet, Take 1 tablet (5 mg) by mouth daily, Disp: 90 tablet, Rfl: 2     spironolactone (ALDACTONE) 25 MG tablet, Take 1 tablet (25 mg) by mouth daily, Disp: 90 tablet, Rfl: 0     tolterodine (DETROL LA) 4 MG 24 hr capsule, Take 1 capsule (4 mg) by mouth daily, Disp: 30 capsule, Rfl: 11      Allergies:   Blood transfusion related (informational only); Metoprolol; No clinical screening - see comments; Penicillins; and Tape [adhesive tape]      Past  "Medical History:  Antiplatelet or antithrombotic long-term use  Arrhythmia  Atrial tachycardia, flutter  Paroxysmal atrial fibrillation  Arthritis  Lymphoma  Cardiac arrest  history of chemotherapy  Primary pulmonary hypertension  Neuropathy  Postoperative nausea and vomiting  Rheumatoid arthritis  Hyperlipidemia LDL goal <130  Lymphedema of both lower extremities  Cardiogenic shock  Acute respiratory failure with hypoxia  Critical illness myopathy  Sepsis  Wound of left upper extremity  Diffuse large B-cell lymphoma of extranodal site  Febrile neutropenia  Physical deconditioning  Palpitations     Past Surgical History:  Anesthesia cardioversion  Right wrist arthrodesis  Bone marrow aspirate & biopsy  Excise lesion upper extremity - left ar wound excision and closure, possible submuscular transposition of ulnar nerve  Foot surgery - 4 left, 2 right  Carpal tunnel bilateral release  Repair ventricular septal defect  Transpositional ulnar nerve (elbow)    Family History:   Mother - breast cancer, hypertension, alzheimer disease  Father - hypertension, lymphoma, circulatory A fib  Paternal grandmother - diabetes     Social History:   No smoking or tobacco use  No alcohol use     Physical Exam:   /82   Pulse 91   Temp 97.8  F (36.6  C) (Oral)   Ht 1.473 m (4' 10\")   Wt 62 kg (136 lb 11.2 oz)   SpO2 100%   BMI 28.57 kg/m      Wt Readings from Last 4 Encounters:   02/28/19 62 kg (136 lb 11.2 oz)   02/22/19 57.4 kg (126 lb 9.6 oz)   11/01/18 55.2 kg (121 lb 11.2 oz)   10/04/18 55.2 kg (121 lb 12.8 oz)     Constitutional: Well-developed, appearing stated age; cooperative  Eyes: Normal EOM, PERRLA, vision, conjunctiva, sclera  ENT: Normal external ears, nose, hearing, lips, teeth, gums  Neck: No mass or thyroid enlargement  : Not tested  Lymph: No cervical, supraclavicular, or epitrochlear nodes  MS:   R shoulder non-tender, pain elicited with extension. Wrists with markedly limited ROM at baseline, no " synovitis. Several deformities present in right and left fingers, worse in right. In particular, right 2nd and 3rd MCP joints with mild chronic synoviis. Ulnar deviation bilaterally. Mild chronic synovitis at 1st MCP, 3rd PIP on right as well as left wrist. Trace in left 3rd PIP.  Skin: No nail pitting, alopecia, rash, nodules or lesions; + hair loss  Neuro: + chronic polyneuropathy with deminished sensation in BL LE.   Psych: Normal judgement, orientation, memory, affect.     Images:  Eye Exam (10/23/18)  Significant for mild H-lated nuclear cataracts.   Ocular CT was recommended for right eye.     CT Chest/Abdomen/Pelvis (9/26/18)  In this patient with a history of lymphoma:  1. No evidence of disease recurrence, consistent with complete  response per Lugano criteria.  2. Stable cardiomegaly. Improved pericardial effusion.  3. Early contrast phase with heterogeneous visceral enhancement in the  abdomen, likely secondary to cardiac dysfunction.    MRI Cardiac w/contrast (4/12/18)  Loculated exudative pericardial effusion that is beginning to organize, with diffuse  pericardial enhancement consistent with ongoing inflammation. Normal LV fxn, LVEF 54% and RVEF 49%. At  least moderate, possibly severe tricuspid regurgitation. Compared to MRI from 03/2018, effusion is largely  unchanged in size (maybe a bit smaller) but is starting to organize. No change in pericardial enhancement.    Laboratory:   Component      Latest Ref Rng & Units 10/4/2018   HPV Source       SurePath   HPV 16 DNA      NEG:Negative Negative   HPV 18 DNA      NEG:Negative Negative   Other HR HPV      NEG:Negative Negative   Final Diagnosis       This patient's sample is negative for HPV DNA.   Specimen Description       Cervical Cells   PAP       NIL   Copath Report       Patient Name: FIDELIA MIDDLETON .   Digoxin Level      0.5 - 2.0 ug/L 0.6   Hemoglobin A1C      0 - 5.6 % 5.3   Vitamin D Deficiency screening      20 - 75 ug/L 48     I  discussed the findings and recommendations with the patient.  Recommendations were discussed with the consulting team.  Time spent: 30 minutes    Nayeli Pritchett MD, MS  Rheumatology Attending Physician  pgr [phone]

## 2019-03-01 NOTE — PROGRESS NOTES
[facility]  Rheumatology Clinic  Nayeli Pritchett MD  2019     Name: Amelia Michel  MRN: [id number]  Age: 57 year old  : 1960  Referring provider: Comfort Sabillon    Assessment and Plan:  #Seropositive rheumatoid arthritis (, RF 31) with multiple joint disability including MTP fusion 1-5 bilaterally, partial right wrist fusion, subluxation and ulnar deformity of MCP's  #Diffuse large B-cell lymphoma status-post 1 cycle R-EPOCH, 6 cycles R-CHOP  #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   #Slowly healing wound in medial left antecubital fossa after traumatic IV  #Long-term corticosteroid use  #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 She also reports BL knee pain, both worse towards the end of the day. I suggest a trial of physical therapy at this point for likely osteoarthritis symptoms. Amelia knows also that she can either get a joint injection or a short burst of prednisone for symptom relief (per her preference). She is not interested in using an alternative (leflunomide or rituximab) for RA.     She is due for a lymphoma surveillance CT chest/abd/pelv and we will re-evaluate whether any shoulder effusion or other abnormality is present on this test.    # Osteoporosis - last DEXA . Received 6-7 years of alendronate in early s. Restarted alendronate in 2019. This is likely safe after an extended drug holiday, vitamin D levels have also been normal. Continue vit D-Ca supplementation.    # Plaquenil eye exam 10/18 and OCT ok (other than cataracts) with a recommendation to return in 1 year         Follow-up: Return in about 6 months (around 2019) for Frederic  Fransisca.    Subjective:  Today, Amelia reports that she has had increased pain in her right shoulder, primarily losing rande of motion to extension. Notes that this has gotten worse after colchicine (for pericarditis) was stopped. She currently wears a brace on her feet to treat neuropathy, and uses a walker. She notes that these braces alter her gait, and as a result, has had some knee and low back pain, worse towards the end of the day.     HPI:   Amelia Michel is a 57 year old female with a history of rheumatoid arthritis and multiple medical problems listed above. She has been treated concervatively with plaquenil and 5mg prednisone, and not interested in rituximab or other biologics. She has previously been treated with sulfasalazine and initially developed a rash which was initially thought to be a sulfa rash, but her rash resolved and did not reoccur after being placed back on sulfasalazine. She was also treated with Arava, but this discontinued during her cancer treatment. She feels that these drugs have had no effect on her symptoms.       Interval history 3/30/18  On 3/30/18, her rheumatoid arthritis appeared to be under good control. Her plan at this time was to continue prednisone 5mg daily and HCQ 200mg twice a day. She was told to have an eye exam and follow up in 3 months. It was recommended that she get a DEXA scan to evaluate whether bisphosphonate was indicated (had 7-8 years of Fosamax in early 2000's). Previous use of methotrexate has been linked to her development of a severe neuropathy in the digits of her upper and lower extremities. She has also previously used arava.    Interval history 5/31/18  The patient reports spraining her ankle prior to her cardiac arrest, which caused some neuropathy in her sprained foot. Therefore she is currently wearing an ankle brace and using a walker. However, her arthritis is doing fine she reports that the colchicine she was recently prescribed has been  very effective. She mentions that she is switching insurance companies and had an unexpected arm surgery, which has prevented her from getting an eye exam that she is aware of being overdue for. She was also laid off recently but was able to fill the majority of her medications and made the most of her previous insurance so she should be able to manage for a while. She has undergone multiple CT scans and is currently finished with chemotherapy. Lymphoma is in remission and her heart is doing fine. She is interested in the options she has for medications that manage her rheumatoid arthritis and her lymphoma. She has taken many medications including plaquenil, prednisone, arava, methotrexate, orencia, and rituximab, and has plenty of options to choose from if she wants to lower the cost of prescriptions or avoid unwanted side-effects. The plan at this time was to continue prednisone 5mg daily.     Review of Systems:   Pertinent items are noted in HPI or as below, remainder of complete ROS is negative.      No recent problems with hearing or vision. No swallowing problems.   No breathing difficulty, shortness of breath, coughing, or wheezing  No chest pain or palpitations  No heart burn, indigestion, abdominal pain, nausea, vomiting, diarrhea  No urination problems, no bloody, cloudy urine, no dysuria  No numbing, tingling, weakness  No headaches or confusion  No rashes. No easy bleeding or bruising.     Active Medications:     Current Outpatient Prescriptions:      acetaminophen (TYLENOL) 325 MG tablet, Take 2 tablets (650 mg) by mouth every 4 hours as needed for mild pain, fever or headaches, Disp: , Rfl:      apixaban ANTICOAGULANT (ELIQUIS) 5 MG tablet, Take 1 tablet (5 mg) by mouth 2 times daily, Disp: 180 tablet, Rfl: 3     ascorbic acid 500 MG TABS, Take 1 tablet (500 mg) by mouth daily, Disp: 30 tablet, Rfl: 0     atenolol (TENORMIN) 25 MG tablet, Take 1 tablet (25 mg) by mouth 2 times daily, Disp: 180 tablet,  Rfl: 3     calcium polycarbophil (FIBERCON) 625 MG tablet, Take 1 tablet by mouth 2 times daily, Disp: , Rfl:      calcium-vitamin D (CALTRATE) 600-400 MG-UNIT per tablet, Take 2 tablets by mouth every morning (Patient taking differently: Take 1 tablet by mouth every morning ), Disp: 60 tablet, Rfl: 0     digoxin (LANOXIN) 125 MCG tablet, Take 250mcg daily for 2 days, then decrease to 125mcg daily, Disp: 90 tablet, Rfl: 3     furosemide (LASIX) 20 MG tablet, Take 1 tablet (20 mg) by mouth daily, Disp: 90 tablet, Rfl: 3     gabapentin (NEURONTIN) 100 MG capsule, TAKE TWO CAPSULES BY MOUTH THREE TIMES A DAY, Disp: 180 capsule, Rfl: 2     gabapentin 8 % GEL topical PLO cream, Apply thin layer to area of neuropathy, Disp: 100 g, Rfl: 3     gabapentin POWD, , Disp: , Rfl:      HYDROCORTISONE PO, Take 100 mg by mouth IV, Disp: , Rfl:      hydroxychloroquine (PLAQUENIL) 200 MG tablet, Take 1 tablet (200 mg) by mouth 2 times daily, Disp: 180 tablet, Rfl: 0     multivitamin, therapeutic with minerals (THERA-VIT-M) TABS tablet, Take 1 tablet by mouth daily, Disp: 30 each, Rfl: 0     order for DME, Equipment being ordered: Thigh high compression stockings Class 2: off the shelf or custom, farrow foot wrap and juxta fit premium lower leg wrap, lymphedema bandaging supplies 3 sets, Disp: 1 Units, Rfl: 1     order for DME, Equipment being ordered: BP cuff, Disp: 1 Device, Rfl: 1     predniSONE (DELTASONE) 5 MG tablet, Take 1 tablet (5 mg) by mouth daily, Disp: 90 tablet, Rfl: 2     spironolactone (ALDACTONE) 25 MG tablet, Take 1 tablet (25 mg) by mouth daily, Disp: 90 tablet, Rfl: 0     tolterodine (DETROL LA) 4 MG 24 hr capsule, Take 1 capsule (4 mg) by mouth daily, Disp: 30 capsule, Rfl: 11      Allergies:   Blood transfusion related (informational only); Metoprolol; No clinical screening - see comments; Penicillins; and Tape [adhesive tape]      Past Medical History:  Antiplatelet or antithrombotic long-term  "use  Arrhythmia  Atrial tachycardia, flutter  Paroxysmal atrial fibrillation  Arthritis  Lymphoma  Cardiac arrest  history of chemotherapy  Primary pulmonary hypertension  Neuropathy  Postoperative nausea and vomiting  Rheumatoid arthritis  Hyperlipidemia LDL goal <130  Lymphedema of both lower extremities  Cardiogenic shock  Acute respiratory failure with hypoxia  Critical illness myopathy  Sepsis  Wound of left upper extremity  Diffuse large B-cell lymphoma of extranodal site  Febrile neutropenia  Physical deconditioning  Palpitations     Past Surgical History:  Anesthesia cardioversion  Right wrist arthrodesis  Bone marrow aspirate & biopsy  Excise lesion upper extremity - left ar wound excision and closure, possible submuscular transposition of ulnar nerve  Foot surgery - 4 left, 2 right  Carpal tunnel bilateral release  Repair ventricular septal defect  Transpositional ulnar nerve (elbow)    Family History:   Mother - breast cancer, hypertension, alzheimer disease  Father - hypertension, lymphoma, circulatory A fib  Paternal grandmother - diabetes     Social History:   No smoking or tobacco use  No alcohol use     Physical Exam:   /82   Pulse 91   Temp 97.8  F (36.6  C) (Oral)   Ht 1.473 m (4' 10\")   Wt 62 kg (136 lb 11.2 oz)   SpO2 100%   BMI 28.57 kg/m     Wt Readings from Last 4 Encounters:   02/28/19 62 kg (136 lb 11.2 oz)   02/22/19 57.4 kg (126 lb 9.6 oz)   11/01/18 55.2 kg (121 lb 11.2 oz)   10/04/18 55.2 kg (121 lb 12.8 oz)     Constitutional: Well-developed, appearing stated age; cooperative  Eyes: Normal EOM, PERRLA, vision, conjunctiva, sclera  ENT: Normal external ears, nose, hearing, lips, teeth, gums  Neck: No mass or thyroid enlargement  : Not tested  Lymph: No cervical, supraclavicular, or epitrochlear nodes  MS:  R shoulder non-tender, pain elicited with extension. Wrists with markedly limited ROM at baseline, no synovitis. Several deformities present in right and left fingers, " worse in right. In particular, right 2nd and 3rd MCP joints with mild chronic synoviis. Ulnar deviation bilaterally. Mild chronic synovitis at 1st MCP, 3rd PIP on right as well as left wrist. Trace in left 3rd PIP.  Skin: No nail pitting, alopecia, rash, nodules or lesions; + hair loss  Neuro: + chronic polyneuropathy with deminished sensation in BL LE.   Psych: Normal judgement, orientation, memory, affect.     Images:  Eye Exam (10/23/18)  Significant for mild H-lated nuclear cataracts.   Ocular CT was recommended for right eye.     CT Chest/Abdomen/Pelvis (9/26/18)  In this patient with a history of lymphoma:  1. No evidence of disease recurrence, consistent with complete  response per Lug[place] criteria.  2. Stable cardiomegaly. Improved pericardial effusion.  3. Early contrast phase with heterogeneous visceral enhancement in the  abdomen, likely secondary to cardiac dysfunction.    MRI Cardiac w/contrast (4/12/18)  Loculated exudative pericardial effusion that is beginning to organize, with diffuse  pericardial enhancement consistent with ongoing inflammation. Normal LV fxn, LVEF 54% and RVEF 49%. At  least moderate, possibly severe tricuspid regurgitation. Compared to MRI from 03/2018, effusion is largely  unchanged in size (maybe a bit smaller) but is starting to organize. No change in pericardial enhancement.    Laboratory:   Component      Latest Ref Rng & Units 10/4/2018   HPV Source       Sure[facility]   HPV 16 DNA      NEG:Negative Negative   HPV 18 DNA      NEG:Negative Negative   Other HR HPV      NEG:Negative Negative   Final Diagnosis       This patient's sample is negative for HPV DNA.   Specimen Description       Cervical Cells   PAP       NIL   Copath Report       Patient Name: FIDELIA MIDDLETON .   Digoxin Level      0.5 - 2.0 ug/L 0.6   Hemoglobin A1C      0 - 5.6 % 5.3   Vitamin D Deficiency screening      20 - 75 ug/L 48            I discussed the findings and recommendations with the  patient.  Recommendations were discussed with the consulting team.  Time spent: 30 minutes    Nayeli Pritchett MD, MS  Rheumatology Attending Physician  [facility] [phone]

## 2019-03-26 ENCOUNTER — APPOINTMENT (OUTPATIENT)
Dept: LAB | Facility: CLINIC | Age: 59
End: 2019-03-26
Attending: INTERNAL MEDICINE
Payer: COMMERCIAL

## 2019-03-26 ENCOUNTER — ANCILLARY PROCEDURE (OUTPATIENT)
Dept: CT IMAGING | Facility: CLINIC | Age: 59
End: 2019-03-26
Attending: INTERNAL MEDICINE
Payer: COMMERCIAL

## 2019-03-26 ENCOUNTER — OFFICE VISIT (OUTPATIENT)
Dept: TRANSPLANT | Facility: CLINIC | Age: 59
End: 2019-03-26
Attending: INTERNAL MEDICINE
Payer: COMMERCIAL

## 2019-03-26 VITALS
BODY MASS INDEX: 27.29 KG/M2 | SYSTOLIC BLOOD PRESSURE: 111 MMHG | WEIGHT: 130 LBS | TEMPERATURE: 98.5 F | DIASTOLIC BLOOD PRESSURE: 82 MMHG | RESPIRATION RATE: 18 BRPM | HEIGHT: 58 IN | OXYGEN SATURATION: 98 % | HEART RATE: 97 BPM

## 2019-03-26 DIAGNOSIS — I47.19 ATRIAL TACHYCARDIA (H): ICD-10-CM

## 2019-03-26 DIAGNOSIS — I48.92 ATRIAL FLUTTER, UNSPECIFIED TYPE (H): ICD-10-CM

## 2019-03-26 DIAGNOSIS — Q21.0 VSD (VENTRICULAR SEPTAL DEFECT): ICD-10-CM

## 2019-03-26 DIAGNOSIS — I48.0 PAROXYSMAL ATRIAL FIBRILLATION (H): ICD-10-CM

## 2019-03-26 DIAGNOSIS — Z86.79 HISTORY OF PERICARDITIS: ICD-10-CM

## 2019-03-26 DIAGNOSIS — C83.398 DIFFUSE LARGE B-CELL LYMPHOMA OF EXTRANODAL SITE: ICD-10-CM

## 2019-03-26 LAB
ALBUMIN SERPL-MCNC: 4.1 G/DL (ref 3.4–5)
ALP SERPL-CCNC: 135 U/L (ref 40–150)
ALT SERPL W P-5'-P-CCNC: 41 U/L (ref 0–50)
ANION GAP SERPL CALCULATED.3IONS-SCNC: 8 MMOL/L (ref 3–14)
AST SERPL W P-5'-P-CCNC: 32 U/L (ref 0–45)
BASOPHILS # BLD AUTO: 0 10E9/L (ref 0–0.2)
BASOPHILS NFR BLD AUTO: 0.3 %
BILIRUB SERPL-MCNC: 0.7 MG/DL (ref 0.2–1.3)
BUN SERPL-MCNC: 20 MG/DL (ref 7–30)
CALCIUM SERPL-MCNC: 9.8 MG/DL (ref 8.5–10.1)
CHLORIDE SERPL-SCNC: 100 MMOL/L (ref 94–109)
CO2 SERPL-SCNC: 26 MMOL/L (ref 20–32)
CREAT SERPL-MCNC: 0.72 MG/DL (ref 0.52–1.04)
DIFFERENTIAL METHOD BLD: ABNORMAL
DIGOXIN SERPL-MCNC: 0.8 UG/L (ref 0.5–2)
EOSINOPHIL # BLD AUTO: 0 10E9/L (ref 0–0.7)
EOSINOPHIL NFR BLD AUTO: 0.2 %
ERYTHROCYTE [DISTWIDTH] IN BLOOD BY AUTOMATED COUNT: 12.2 % (ref 10–15)
GFR SERPL CREATININE-BSD FRML MDRD: >90 ML/MIN/{1.73_M2}
GLUCOSE SERPL-MCNC: 111 MG/DL (ref 70–99)
HCT VFR BLD AUTO: 46.3 % (ref 35–47)
HGB BLD-MCNC: 15.3 G/DL (ref 11.7–15.7)
IMM GRANULOCYTES # BLD: 0 10E9/L (ref 0–0.4)
IMM GRANULOCYTES NFR BLD: 0.2 %
LYMPHOCYTES # BLD AUTO: 0.5 10E9/L (ref 0.8–5.3)
LYMPHOCYTES NFR BLD AUTO: 7.7 %
MCH RBC QN AUTO: 34.9 PG (ref 26.5–33)
MCHC RBC AUTO-ENTMCNC: 33 G/DL (ref 31.5–36.5)
MCV RBC AUTO: 106 FL (ref 78–100)
MONOCYTES # BLD AUTO: 0.5 10E9/L (ref 0–1.3)
MONOCYTES NFR BLD AUTO: 7.5 %
NEUTROPHILS # BLD AUTO: 5.4 10E9/L (ref 1.6–8.3)
NEUTROPHILS NFR BLD AUTO: 84.1 %
NRBC # BLD AUTO: 0 10*3/UL
NRBC BLD AUTO-RTO: 0 /100
PLATELET # BLD AUTO: 167 10E9/L (ref 150–450)
POTASSIUM SERPL-SCNC: 3.8 MMOL/L (ref 3.4–5.3)
PROT SERPL-MCNC: 7.4 G/DL (ref 6.8–8.8)
RBC # BLD AUTO: 4.39 10E12/L (ref 3.8–5.2)
SODIUM SERPL-SCNC: 135 MMOL/L (ref 133–144)
WBC # BLD AUTO: 6.4 10E9/L (ref 4–11)

## 2019-03-26 PROCEDURE — G0463 HOSPITAL OUTPT CLINIC VISIT: HCPCS | Mod: ZF

## 2019-03-26 PROCEDURE — 80162 ASSAY OF DIGOXIN TOTAL: CPT | Performed by: INTERNAL MEDICINE

## 2019-03-26 PROCEDURE — 25000581 ZZH RX MED A9270 GY (STAT IND- M) 250: Mod: ZF | Performed by: INTERNAL MEDICINE

## 2019-03-26 PROCEDURE — G0463 HOSPITAL OUTPT CLINIC VISIT: HCPCS | Mod: 25

## 2019-03-26 PROCEDURE — 90471 IMMUNIZATION ADMIN: CPT

## 2019-03-26 PROCEDURE — 85025 COMPLETE CBC W/AUTO DIFF WBC: CPT | Performed by: INTERNAL MEDICINE

## 2019-03-26 PROCEDURE — 80053 COMPREHEN METABOLIC PANEL: CPT | Performed by: INTERNAL MEDICINE

## 2019-03-26 PROCEDURE — 90750 HZV VACC RECOMBINANT IM: CPT | Mod: ZF | Performed by: INTERNAL MEDICINE

## 2019-03-26 RX ORDER — ATENOLOL 25 MG/1
25 TABLET ORAL 2 TIMES DAILY
Qty: 180 TABLET | Refills: 3 | Status: ON HOLD | OUTPATIENT
Start: 2019-03-26 | End: 2019-12-14

## 2019-03-26 RX ORDER — IOPAMIDOL 755 MG/ML
84 INJECTION, SOLUTION INTRAVASCULAR ONCE
Status: COMPLETED | OUTPATIENT
Start: 2019-03-26 | End: 2019-03-26

## 2019-03-26 RX ADMIN — IOPAMIDOL 84 ML: 755 INJECTION, SOLUTION INTRAVASCULAR at 10:10

## 2019-03-26 RX ADMIN — ZOSTER VACCINE RECOMBINANT, ADJUVANTED 0.5 ML: KIT at 13:09

## 2019-03-26 ASSESSMENT — MIFFLIN-ST. JEOR: SCORE: 1059.43

## 2019-03-26 ASSESSMENT — PAIN SCALES - GENERAL: PAINLEVEL: MILD PAIN (3)

## 2019-03-26 NOTE — NURSING NOTE
Chief Complaint   Patient presents with     Blood Draw     Labs drawn via PIV placed in CT by RN in lab. Line flushed with saline. VS taken.      Shankar Gaston RN

## 2019-03-26 NOTE — DISCHARGE INSTRUCTIONS

## 2019-03-26 NOTE — LETTER
3/26/2019     RE: Amelia Michel  [address]  [place] [address]     Dear Colleague,    Thank you for referring your patient, Amelia Michel, to the [facility] at [facility]. Please see a copy of my visit note below.  [facility] Visit  3/26/2019         Disease and Treatment History:  1. Complicated patient with history of Rheumatoid Arthritis status post long term treatment with methotrexate with recent significant complicated course with cardiac arrest, admission with hypotension, sepsis, ICU stay and intubation with subsequent additional readmission from TCU in 6/2017 for FUO with extensive work-up with eventual finding of progression cytopenias with eventual lymphoma diagnosis  2. Bone Marrow Biopsy: 7/12/2017: Highly suspicious for involvement by B cell lymphoma with foci of large atypical CD20+ cells  3. PET CT 7/19/2017: Extensive activity: Right paratracheal lesion with SUV 28, many liver lesions with SUV 15, cardiac lesion intraarterial septum, numerous bone lesions.  4. 7/21 Liver Biopsy: Consistent with Diffuse Large B Cell Lymphoma non-germinal center phenotype  -- FISH for myc, bcl2, bcl6 negative for double-hit lymphoma  5. IPI based on Age < 60 (0 points) + PS 2 (1 + point) + Stage IV Disease (1 point) + Extranodal disease > 1 site (1 point) + elevated LDH (1 point) = 4 Poor Risk Disease  6. Cycle 1 given as R-EPOCH Day 1 = 7/27/2017  -- complications of transfusion dependence and need for acute rehab placement (likely more result of extensive hospital stays)  - LP negative for CNS involvement 8/23/2017  7. Cycle #2: Transition to R-CHOP Day 1 = 8/22/2017  - Day 15 high dose MTX for CNS prophylaxis  - complicated by significant fluid overload and PICC associated DVT  8. Cycle #3 Day 1 = 9/20/2017 Post Cycle 3 PET CR. Cycle 4 10/11/2017 + IT prophylaxis, Cycle 5 11/8/17, Cycle 6 11/29/2017 + IT prophylaxis  -- Post  Treatment completion PET 1/15/2018: Complete Remission      HPI: Amelia and her  come in today to review CT scans and follow-up her lymphoma. She notes no recent infections. She notes that it was a tough winter with the cold and inability to go out much. Mood was down. Also had a fall and hurt her right shoulder and has been dealing with that. Doing her own PT exercises at home but hasn't been to formal PT as of yet but has an order for it. Taking tylenol for pain. Notes her hand and leg neuropathy is stable. Notes no bleeding or increased bruising on Eliquis.         10 point ROS otherwise negative      Physical Exam:  /82 (BP Location: Right arm, Patient Position: Sitting, Cuff Size: Adult Regular)   Pulse 97   Temp 98.5  F (36.9  C) (Oral)   Resp 18   Wt 59 kg (130 lb)   SpO2 98%   BMI 27.17 kg/m       Gen: looks well. KPS 80  HEENT: OP clear, no erythema or thrush, no palpable MCKAYLA  Lungs:CTAB no crackles or wheezes  CV: stable loud systolic murmur. Regular on exam today  Abd:soft  Ext:no edema. Brace on right leg. Stockings on  - Left arm wound healed       Labs:  Results for AMELIA MIDDLETON (MRN [id number]) as of 3/26/2019 12:34   Ref. Range 3/26/2019 11:26   Sodium Latest Ref Range: 133 - 144 mmol/L 135   Potassium Latest Ref Range: 3.4 - 5.3 mmol/L 3.8   Chloride Latest Ref Range: 94 - 109 mmol/L 100   Carbon Dioxide Latest Ref Range: 20 - 32 mmol/L 26   Urea Nitrogen Latest Ref Range: 7 - 30 mg/dL 20   Creatinine Latest Ref Range: 0.52 - 1.04 mg/dL 0.72   GFR Estimate Latest Ref Range: >60 mL/min/1.73_m2 >90   GFR Estimate If Black Latest Ref Range: >60 mL/min/1.73_m2 >90   Calcium Latest Ref Range: 8.5 - 10.1 mg/dL 9.8   Anion Gap Latest Ref Range: 3 - 14 mmol/L 8   Albumin Latest Ref Range: 3.4 - 5.0 g/dL 4.1   Protein Total Latest Ref Range: 6.8 - 8.8 g/dL 7.4   Bilirubin Total Latest Ref Range: 0.2 - 1.3 mg/dL 0.7   Alkaline Phosphatase Latest Ref Range: 40 - 150 U/L 135   ALT  Latest Ref Range: 0 - 50 U/L 41   AST Latest Ref Range: 0 - 45 U/L 32   Glucose Latest Ref Range: 70 - 99 mg/dL 111 (H)   WBC Latest Ref Range: 4.0 - 11.0 10e9/L 6.4   Hemoglobin Latest Ref Range: 11.7 - 15.7 g/dL 15.3   Hematocrit Latest Ref Range: 35.0 - 47.0 % 46.3   Platelet Count Latest Ref Range: 150 - 450 10e9/L 167   RBC Count Latest Ref Range: 3.8 - 5.2 10e12/L 4.39   MCV Latest Ref Range: 78 - 100 fl 106 (H)   MCH Latest Ref Range: 26.5 - 33.0 pg 34.9 (H)   MCHC Latest Ref Range: 31.5 - 36.5 g/dL 33.0   RDW Latest Ref Range: 10.0 - 15.0 % 12.2   Diff Method Unknown Automated Method   % Neutrophils Latest Units: % 84.1   % Lymphocytes Latest Units: % 7.7   % Monocytes Latest Units: % 7.5   % Eosinophils Latest Units: % 0.2   % Basophils Latest Units: % 0.3   % Immature Granulocytes Latest Units: % 0.2   Nucleated RBCs Latest Ref Range: 0 /100 0   Absolute Neutrophil Latest Ref Range: 1.6 - 8.3 10e9/L 5.4   Absolute Lymphocytes Latest Ref Range: 0.8 - 5.3 10e9/L 0.5 (L)   Absolute Monocytes Latest Ref Range: 0.0 - 1.3 10e9/L 0.5   Absolute Eosinophils Latest Ref Range: 0.0 - 0.7 10e9/L 0.0   Absolute Basophils Latest Ref Range: 0.0 - 0.2 10e9/L 0.0   Abs Immature Granulocytes Latest Ref Range: 0 - 0.4 10e9/L 0.0   Absolute Nucleated RBC Unknown 0.0       CT C/A/P:  Chest: Unchanged multinodular appearance of the thyroid with apparent  thyroidal tissue extending inferiorly into superior mediastinum. This  shows long-standing stability, since at least 2015. A few prominent  upper mediastinal lymph nodes in the prevascular space, similar to  prior. Heart is borderline enlarged, stable from prior. Substernal  calcifications are again seen. Trace pericardial fluid and pericardial  calcification, similar to prior. No main or lobar pulmonary embolus.  Trachea and central airways are clear. No pulmonary parenchymal  consolidation and no pleural effusion. No suspicious pulmonary  nodules.     Abdomen and pelvis:    Unchanged heterogeneous hepatic parenchymal enhancement without  worrisome liver lesion. Tiny hypodense lesion anteriorly in the left  hepatic lobe, too small to accurately characterize, and stable since  the prior Images appear to been acquired during the late arterial  phase as there is no contrast in the hepatic venous system.  Gallbladder is normal; no dense stone. Spleen is normal, not enlarged.  Negative adrenal glands. Pancreas is unremarkable. A few right and  left renal cysts. No abdominal aortic aneurysm. Mild aortobiiliac  atherosclerotic plaque deposition. No bowel obstruction. Uterus is  normal for age. No adnexal mass. Urinary bladder is well distended,  otherwise normal. No abdominal pelvic lymphadenopathy.     Bones and soft tissues: No suspicious lesions in the bones.  Degenerative changes throughout the spine. For thoracolumbar  curvature. Old right and left sided rib fractures. Pediatric median  sternotomy wires.                                                                      IMPRESSION:   1. No adenopathy in the chest, abdomen, or pelvis.  2. Heart remains enlarged.  3. Heterogeneous hepatic parenchymal enhancement, potentially  secondary to passive congestion. Additionally, suboptimal phase timing  likely relates to impaired cardiac output    A/P: 59 yo woman with history of rhematoid arthritis and history of stage IVB high risk aggressive Diffuse large B cell lymphoma in 7/2017    1. Lymphoma: Got cycle #1 in the hospital and I chose R-EPOCH for infusional nature due to possible intracardiac involvement and also extensive disease to allow for slower lymphoma kill. Tolerated this well, aside from significant cytopenias (which were present at diagnosis). Then transitioned to R-CHOP to finish out a total of 6 cycles of chemotherapy (1 R-EPOCH and 5 R-CHOP) with initially planned for High Dose MTX on Day 15 of cycle 2,4, and 6 due to high IPI but then transition to prophy IT chemo on cycle 4  and 6 due to toxicity with the high dose methotrexate.   -- PET CT post 3 cycles CR1 and repeat PET CT post therapy completion in 1/2018 showed CR1.     -- Follow-up scans now show ongoing CR. Will plan for final CT imaging in summer 2019 (2 years post diagnosis) and then prn going forward    2. ID:  Not on any abx at this point   - Shingrix vaccine today and booster in 6 months    3. Rheumatoid arthritis: is on prednisone 5 mg daily and plaquenil. Stable    4. CV: A. Fib. On digoxin and atenolol. Status post numerous ablations as above but best control seems to correlate with the digoxin. Currently in normal rate and rhythm  -- Pericardial effusion noted during hospitalization in 3/2018 and pericarditis on 3/8/2018 MRI. Repeat MRI 4/12/2018 showed ongoing pericardial effusion with the start of organization felt to be ongoing inflammation. CT Chest today shows no recurrence of this    5. Heme: Counts good today.    5. Wound: status post surgery on 5/22/18 and has healed    7. Neuropathy: on gabapentin. Stable. No worsening but no significant improvement either    8. Right shoulder discomfort: no obvious fracture or abnormality documented on CT bone windows. Encouraged her to do PT.      Final Plan:  [place], labs, and CT in 6 months      Lenore Rodriguez

## 2019-03-26 NOTE — NURSING NOTE
Shingrix was given to patient in the left arm, patient tolerated well with no complications. See OZZY FranceA

## 2019-03-26 NOTE — PROGRESS NOTES
[facility] Cancer Clinic Visit  3/26/2019         Disease and Treatment History:  1. Complicated patient with history of Rheumatoid Arthritis status post long term treatment with methotrexate with recent significant complicated course with cardiac arrest, admission with hypotension, sepsis, ICU stay and intubation with subsequent additional readmission from TCU in 6/2017 for FUO with extensive work-up with eventual finding of progression cytopenias with eventual lymphoma diagnosis  2. Bone Marrow Biopsy: 7/12/2017: Highly suspicious for involvement by B cell lymphoma with foci of large atypical CD20+ cells  3. PET CT 7/19/2017: Extensive activity: Right paratracheal lesion with SUV 28, many liver lesions with SUV 15, cardiac lesion intraarterial septum, numerous bone lesions.  4. 7/21 Liver Biopsy: Consistent with Diffuse Large B Cell Lymphoma non-germinal center phenotype  -- FISH for myc, bcl2, bcl6 negative for double-hit lymphoma  5. IPI based on Age < 60 (0 points) + PS 2 (1 + point) + Stage IV Disease (1 point) + Extranodal disease > 1 site (1 point) + elevated LDH (1 point) = 4 Poor Risk Disease  6. Cycle 1 given as R-EPOCH Day 1 = 7/27/2017  -- complications of transfusion dependence and need for acute rehab placement (likely more result of extensive hospital stays)  - LP negative for CNS involvement 8/23/2017  7. Cycle #2: Transition to R-CHOP Day 1 = 8/22/2017  - Day 15 high dose MTX for CNS prophylaxis  - complicated by significant fluid overload and PICC associated DVT  8. Cycle #3 Day 1 = 9/20/2017 Post Cycle 3 PET CR. Cycle 4 10/11/2017 + IT prophylaxis, Cycle 5 11/8/17, Cycle 6 11/29/2017 + IT prophylaxis  -- Post Treatment completion PET 1/15/2018: Complete Remission      HPI: Amelia and her  come in today to review CT scans and follow-up her lymphoma. She notes no recent infections. She notes that it was a tough winter with the cold and inability to go out much. Mood was down. Also had a fall  and hurt her right shoulder and has been dealing with that. Doing her own PT exercises at home but hasn't been to formal PT as of yet but has an order for it. Taking tylenol for pain. Notes her hand and leg neuropathy is stable. Notes no bleeding or increased bruising on Eliquis.         10 point ROS otherwise negative      Physical Exam:  /82 (BP Location: Right arm, Patient Position: Sitting, Cuff Size: Adult Regular)   Pulse 97   Temp 98.5  F (36.9  C) (Oral)   Resp 18   Wt 59 kg (130 lb)   SpO2 98%   BMI 27.17 kg/m      Gen: looks well. KPS 80  HEENT: OP clear, no erythema or thrush, no palpable MCKAYLA  Lungs:CTAB no crackles or wheezes  CV: stable loud systolic murmur. Regular on exam today  Abd:soft  Ext:no edema. Brace on right leg. Stockings on  - Left arm wound healed       Labs:  Results for FIDELIA MIDDLETON (MRN [id number]) as of 3/26/2019 12:34   Ref. Range 3/26/2019 11:26   Sodium Latest Ref Range: 133 - 144 mmol/L 135   Potassium Latest Ref Range: 3.4 - 5.3 mmol/L 3.8   Chloride Latest Ref Range: 94 - 109 mmol/L 100   Carbon Dioxide Latest Ref Range: 20 - 32 mmol/L 26   Urea Nitrogen Latest Ref Range: 7 - 30 mg/dL 20   Creatinine Latest Ref Range: 0.52 - 1.04 mg/dL 0.72   GFR Estimate Latest Ref Range: >60 mL/min/1.73_m2 >90   GFR Estimate If Black Latest Ref Range: >60 mL/min/1.73_m2 >90   Calcium Latest Ref Range: 8.5 - 10.1 mg/dL 9.8   Anion Gap Latest Ref Range: 3 - 14 mmol/L 8   Albumin Latest Ref Range: 3.4 - 5.0 g/dL 4.1   Protein Total Latest Ref Range: 6.8 - 8.8 g/dL 7.4   Bilirubin Total Latest Ref Range: 0.2 - 1.3 mg/dL 0.7   Alkaline Phosphatase Latest Ref Range: 40 - 150 U/L 135   ALT Latest Ref Range: 0 - 50 U/L 41   AST Latest Ref Range: 0 - 45 U/L 32   Glucose Latest Ref Range: 70 - 99 mg/dL 111 (H)   WBC Latest Ref Range: 4.0 - 11.0 10e9/L 6.4   Hemoglobin Latest Ref Range: 11.7 - 15.7 g/dL 15.3   Hematocrit Latest Ref Range: 35.0 - 47.0 % 46.3   Platelet Count Latest Ref  Range: 150 - 450 10e9/L 167   RBC Count Latest Ref Range: 3.8 - 5.2 10e12/L 4.39   MCV Latest Ref Range: 78 - 100 fl 106 (H)   MCH Latest Ref Range: 26.5 - 33.0 pg 34.9 (H)   MCHC Latest Ref Range: 31.5 - 36.5 g/dL 33.0   RDW Latest Ref Range: 10.0 - 15.0 % 12.2   Diff Method Unknown Automated Method   % Neutrophils Latest Units: % 84.1   % Lymphocytes Latest Units: % 7.7   % Monocytes Latest Units: % 7.5   % Eosinophils Latest Units: % 0.2   % Basophils Latest Units: % 0.3   % Immature Granulocytes Latest Units: % 0.2   Nucleated RBCs Latest Ref Range: 0 /100 0   Absolute Neutrophil Latest Ref Range: 1.6 - 8.3 10e9/L 5.4   Absolute Lymphocytes Latest Ref Range: 0.8 - 5.3 10e9/L 0.5 (L)   Absolute Monocytes Latest Ref Range: 0.0 - 1.3 10e9/L 0.5   Absolute Eosinophils Latest Ref Range: 0.0 - 0.7 10e9/L 0.0   Absolute Basophils Latest Ref Range: 0.0 - 0.2 10e9/L 0.0   Abs Immature Granulocytes Latest Ref Range: 0 - 0.4 10e9/L 0.0   Absolute Nucleated RBC Unknown 0.0       CT C/A/P:  Chest: Unchanged multinodular appearance of the thyroid with apparent  thyroidal tissue extending inferiorly into superior mediastinum. This  shows long-standing stability, since at least 2015. A few prominent  upper mediastinal lymph nodes in the prevascular space, similar to  prior. Heart is borderline enlarged, stable from prior. Substernal  calcifications are again seen. Trace pericardial fluid and pericardial  calcification, similar to prior. No main or lobar pulmonary embolus.  Trachea and central airways are clear. No pulmonary parenchymal  consolidation and no pleural effusion. No suspicious pulmonary  nodules.     Abdomen and pelvis:   Unchanged heterogeneous hepatic parenchymal enhancement without  worrisome liver lesion. Tiny hypodense lesion anteriorly in the left  hepatic lobe, too small to accurately characterize, and stable since  the prior Images appear to been acquired during the late arterial  phase as there is no  contrast in the hepatic venous system.  Gallbladder is normal; no dense stone. Spleen is normal, not enlarged.  Negative adrenal glands. Pancreas is unremarkable. A few right and  left renal cysts. No abdominal aortic aneurysm. Mild aortobiiliac  atherosclerotic plaque deposition. No bowel obstruction. Uterus is  normal for age. No adnexal mass. Urinary bladder is well distended,  otherwise normal. No abdominal pelvic lymphadenopathy.     Bones and soft tissues: No suspicious lesions in the bones.  Degenerative changes throughout the spine. For thoracolumbar  curvature. Old right and left sided rib fractures. Pediatric median  sternotomy wires.                                                                      IMPRESSION:   1. No adenopathy in the chest, abdomen, or pelvis.  2. Heart remains enlarged.  3. Heterogeneous hepatic parenchymal enhancement, potentially  secondary to passive congestion. Additionally, suboptimal phase timing  likely relates to impaired cardiac output    A/P: 57 yo woman with history of rhematoid arthritis and history of stage IVB high risk aggressive Diffuse large B cell lymphoma in 7/2017    1. Lymphoma: Got cycle #1 in the hospital and I chose R-EPOCH for infusional nature due to possible intracardiac involvement and also extensive disease to allow for slower lymphoma kill. Tolerated this well, aside from significant cytopenias (which were present at diagnosis). Then transitioned to R-CHOP to finish out a total of 6 cycles of chemotherapy (1 R-EPOCH and 5 R-CHOP) with initially planned for High Dose MTX on Day 15 of cycle 2,4, and 6 due to high IPI but then transition to prophy IT chemo on cycle 4 and 6 due to toxicity with the high dose methotrexate.   -- PET CT post 3 cycles CR1 and repeat PET CT post therapy completion in 1/2018 showed CR1.     -- Follow-up scans now show ongoing CR. Will plan for final CT imaging in summer 2019 (2 years post diagnosis) and then prn going  forward    2. ID:  Not on any abx at this point   - Shingrix vaccine today and booster in 6 months    3. Rheumatoid arthritis: is on prednisone 5 mg daily and plaquenil. Stable    4. CV: A. Fib. On digoxin and atenolol. Status post numerous ablations as above but best control seems to correlate with the digoxin. Currently in normal rate and rhythm  -- Pericardial effusion noted during hospitalization in 3/2018 and pericarditis on 3/8/2018 MRI. Repeat MRI 4/12/2018 showed ongoing pericardial effusion with the start of organization felt to be ongoing inflammation. CT Chest today shows no recurrence of this    5. Heme: Counts good today.    5. Wound: status post surgery on 5/22/18 and has healed    7. Neuropathy: on gabapentin. Stable. No worsening but no significant improvement either    8. Right shoulder discomfort: no obvious fracture or abnormality documented on CT bone windows. Encouraged her to do PT.      Final Plan:  [place], labs, and CT in 6 months      Lenore Rodriguez

## 2019-03-26 NOTE — NURSING NOTE
"Oncology Rooming Note    March 26, 2019 12:21 PM   Amelia Michel is a 58 year old female who presents for:    Chief Complaint   Patient presents with     Blood Draw     Labs drawn via PIV placed in CT by RN in lab. Line flushed with saline. VS taken.      Oncology Clinic Visit     Return: DLBCL (diffuse large B cell lymphoma)     Initial Vitals: /82 (BP Location: Right arm, Patient Position: Sitting, Cuff Size: Adult Regular)   Pulse 97   Temp 98.5  F (36.9  C) (Oral)   Resp 18   Ht 1.473 m (4' 10\")   Wt 59 kg (130 lb)   SpO2 98%   BMI 27.17 kg/m   Estimated body mass index is 27.17 kg/m  as calculated from the following:    Height as of this encounter: 1.473 m (4' 10\").    Weight as of this encounter: 59 kg (130 lb). Body surface area is 1.55 meters squared.  Mild Pain (3) Comment: Data Unavailable   No LMP recorded. Patient is postmenopausal.  Allergies reviewed: Yes  Medications reviewed: Yes    Medications: MEDICATION REFILLS NEEDED TODAY. Provider was notified.  Pharmacy name entered into [facility] [facility]: [id number] ([place]) - [place], [place] - [address] AT [facility] [address]    Clinical concerns: Pt requesting refill on the Atenolol.   Dr. Rodriguez  was notified.      Emma Napoles CMA              "

## 2019-03-27 ENCOUNTER — MYC MEDICAL ADVICE (OUTPATIENT)
Dept: CARDIOLOGY | Facility: CLINIC | Age: 59
End: 2019-03-27

## 2019-03-27 DIAGNOSIS — I50.22 CHRONIC SYSTOLIC HEART FAILURE (H): ICD-10-CM

## 2019-03-27 RX ORDER — FUROSEMIDE 20 MG
20 TABLET ORAL DAILY
Qty: 90 TABLET | Refills: 3 | Status: SHIPPED | OUTPATIENT
Start: 2019-03-27 | End: 2020-03-18

## 2019-03-27 NOTE — TELEPHONE ENCOUNTER
furosemide (LASIX) 20 MG tablet   Last Written Prescription Date: 3/29/18  Last Fill Quantity: 90,   # refills: 3  Last Office Visit :2/22/19  Future Office visit:  None

## 2019-04-04 ENCOUNTER — MYC REFILL (OUTPATIENT)
Dept: RHEUMATOLOGY | Facility: CLINIC | Age: 59
End: 2019-04-04

## 2019-04-04 DIAGNOSIS — M05.741 RHEUMATOID ARTHRITIS INVOLVING BOTH HANDS WITH POSITIVE RHEUMATOID FACTOR (H): ICD-10-CM

## 2019-04-04 DIAGNOSIS — M05.742 RHEUMATOID ARTHRITIS INVOLVING BOTH HANDS WITH POSITIVE RHEUMATOID FACTOR (H): ICD-10-CM

## 2019-04-04 RX ORDER — HYDROXYCHLOROQUINE SULFATE 200 MG/1
200 TABLET, FILM COATED ORAL 2 TIMES DAILY
Qty: 180 TABLET | Refills: 1 | Status: SHIPPED | OUTPATIENT
Start: 2019-04-04 | End: 2019-09-11

## 2019-04-04 NOTE — TELEPHONE ENCOUNTER
Last Clinic Visit: 2/28/2019  [facility] Rheumatology    Last eye exam: 10-29-18

## 2019-06-20 ENCOUNTER — MYC REFILL (OUTPATIENT)
Dept: RHEUMATOLOGY | Facility: CLINIC | Age: 59
End: 2019-06-20

## 2019-06-20 DIAGNOSIS — M05.741 RHEUMATOID ARTHRITIS INVOLVING BOTH HANDS WITH POSITIVE RHEUMATOID FACTOR (H): ICD-10-CM

## 2019-06-20 DIAGNOSIS — M05.742 RHEUMATOID ARTHRITIS INVOLVING BOTH HANDS WITH POSITIVE RHEUMATOID FACTOR (H): ICD-10-CM

## 2019-06-21 RX ORDER — PREDNISONE 5 MG/1
5 TABLET ORAL DAILY
Qty: 90 TABLET | Refills: 1 | Status: SHIPPED | OUTPATIENT
Start: 2019-06-21 | End: 2019-06-28

## 2019-06-27 ENCOUNTER — MYC MEDICAL ADVICE (OUTPATIENT)
Dept: RHEUMATOLOGY | Facility: CLINIC | Age: 59
End: 2019-06-27

## 2019-06-27 DIAGNOSIS — M05.742 RHEUMATOID ARTHRITIS INVOLVING BOTH HANDS WITH POSITIVE RHEUMATOID FACTOR (H): ICD-10-CM

## 2019-06-27 DIAGNOSIS — M05.741 RHEUMATOID ARTHRITIS INVOLVING BOTH HANDS WITH POSITIVE RHEUMATOID FACTOR (H): ICD-10-CM

## 2019-06-28 RX ORDER — PREDNISONE 5 MG/1
5 TABLET ORAL DAILY
Qty: 90 TABLET | Refills: 1 | Status: SHIPPED | OUTPATIENT
Start: 2019-06-28 | End: 2019-09-11

## 2019-07-11 NOTE — CONSULTS
[facility], [place]    Palliative Care Consultation Note    Patient: Amelia Michel  Date of Admission:  6/19/2017    Requesting provider/team: cardiology  Reason for consult: new dx of lymphoma, patient/family support    Recommendations:  Please see assessment below for rationale.  - start trazodone 25-50mg QHS PRN insomnia (start with 25mg, repeat 25mg if needed)--I ordered this for you  - will have palliative SW meet with patient this week vs next week to offer additional counseling support  - continue spiritual care support  - palliative team will follow but not daily; anticipate 1-2x per week, more if needed. Please contact us if there are unmet needs/questions.     These recommendations have been discussed with patient and family; paged primary team as well.    Thank you for the opportunity to participate in the care of this patient and family. Our team will follow 1-2x per week. Please feel free to contact the on-call Palliative provider with any urgent needs.     Betty Patrick  Pager: [phone]  [facility] Inpatient Team Consult pager [phone] (M-F 8-4:30)  After-hours Answering Service [phone]   Palliative Clinic: [phone]       Assessment & Plan   Amelia Michel is a 56 year old female with PMHx RA, VSD repair (1969), recent prolonged hospitalization for cardiac arrest complicated by cardiogenic shock who was slowly recovering at ARU and was readmitted from ARU with fever and workup has revealed lymphoma (based on BMBx and PET scan, tissue biopsy pending).     Symptoms:   - primary symptom currently is insomnia related to frequent interruptions for medical cares overnight. Pt open to trying a prn sleep aid and we recommended trazodone 25-50mg qhs prn.    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       Hobbies: [employer]    Mental Health: denies hx of clinical depression or anxiety but has had depressed mood of/on related to physical limitations from RA and then more recently due to her prolonged admission and now this complication of lymphoma    Coping: coping ok but still processing new diagnosis, mood is \"low\" and would like additional support from  and SW counseling    Spiritual/Anglican:    Spiritual background: raised [employer], not regularly practicing but does go to [employer] off/on and is spiritual  Spiritual needs: ongoing spiritual care requested  Sense/Meaning Making: didn't assess    Prognostic Information: still awaiting liver biopsy and treatment plan from hematology. Pt aware that they are likely planning inpatient chemotherapy of some kind and is expecting a prolonged course with more deconditioning.    Advance Care Planning: not addressed today    History of Present Illness   Sources of History:patient and electronic health record    55 yo female with recent dx afib/flutter/SVT initially presented to [facility] 5/29 after out of hospital arrest with shockable rhythm, admitted 5/29-15 with complicated course involving cardiogenic shock with intubation and ICU stay. She was discharged to ARU 6/15-19 but developed worsening LUE wound and fever, admitted back to [facility] and further workup revealed lymphoma.    Met with patient and  and sister today. Pt was told yesterday about lymphoma diagnosis but awaiting definitve dx and treatment plan which will require liver bx tomorrow.     Today says she has some pain on her left UE arm wound but not that bothersome. Explains that the wound is related to chemical " "irritation from IV infiltration with a medication. Denies pain. Sometimes has some nausea but none today. Feels more fatigued with fevers and overall is deconditioned but was making progress at ARU. Having normal BMs, eating/drinking ok. 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 Her main symptom complaint is poor sleep--up many times overnight from vitals checks, urinary incontinence, other interruptions and has hard time falling back asleep. Does say she feels \"down\" but denies severe depression or anxiety. Says she does not have trouble with mind racing at night--she falls alseep but just can stay asleep.      ROS:  Palliative Symptom Review (0=no symptom/no concern, 1=mild, 2=moderate, 3=severe):  Pain: 1  Fatigue: 1  Nausea: 1  Constipation: 0  Diarrhea: 0  Depressive Symptoms: 1  Anxiety: 0  Drowsiness: 1  Poor Appetite: 1  Shortness of Breath: 1 sometimes has off/on dyspnea  Insomnia: 2  Delirium: 0  Other: 0  Overall (0 good/no concerns, 3 very poor): 2    Past Medical History    Cardiac arrest and cardiogenic shock related to Afib/ Vfib arrest   Rheumatoid arthritis  LUE wound  LE lyphedema  Saddle anesthesia  New dx lymphoma      Past Surgical History       Social History   , no children. Sister is strong support and lives near her. 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     Family History   Family history reviewed with patient and is noncontributory.    Medications   I have reviewed this patient's medication profile and medications given in the past 24 hours.    Symptom medications of note:  Gabapentin 200mg TID  Morphine gel TID to skin  Prn tylenol  Prn melatonin  Prn morphine gel to wound  Prn zofran  Prn compazine   Prn tramadol 25mg q 6 hours prn      Physical Exam   Vital Signs: Temp: 98.4  F (36.9  C) Temp src: Oral BP: 124/64 Pulse: 62 Heart Rate: 59 Resp: 18 SpO2: 97 % O2 Device: None (Room " air)    Weight: 157 lbs 12.8 oz    Physical Exam:  Gen: sitting/lying in bed. Appears comfortable   HEENT: NCAT. Conjunctiva clear. Sclera anicteric .  CV: RRR , Peripheral perfusion intact.   Resp: unlabored work of breathing, CTAB  Abd: soft, nt, nd, +BS   Msk: no gross deformity,   Skin:  no jaundice, LUE wound with bandage covering  Ext: warm, well perfused.   Neuro: face symmetric. EOM, vision, hearing grossly intact. Speech fluent. Moves all extremities   Mental status/Psych: alert. Oriented. Asks/answers questions appropriately. Affect is engaged, smiling      Data   Data reviewed:   labs and imaging reviewed in epic  Creatinine 0.74, GFR>90    ROUTINE ICU LABS (Last four results)  CMP  Recent Labs  Lab 07/20/17  0741 07/19/17  0721 07/18/17  0753 07/17/17  0706  07/16/17  0734 07/15/17  0803 07/14/17  0746    136 138 139  --  138 136 136   POTASSIUM 4.0 3.8 3.6 4.0  < > 4.1 4.4 4.3   CHLORIDE 100 101 103 104  --  104 105 101   CO2 27 26 26 27  --  26 24 26   ANIONGAP 8 10 8 8  --  8 8 9   GLC 92 98 90 80  --  85 100* 92   BUN 28 30 30 32*  --  37* 42* 40*   CR 0.74 0.71 0.67 0.70  --  0.85 0.99 1.09*   GFRESTIMATED 81 85 >90Non  GFR Calc 86  --  69 58* 52*   GFRESTBLACK >90African American GFR Calc >90African American GFR Calc >90African American GFR Calc >90African American GFR Calc  --  84 70 63   VIKTORIYA 8.6 8.7 8.9 9.1  --  9.1 8.5 8.6   MAG  --  2.0 1.8  --   --   --   --   --    PROTTOTAL  --   --   --   --   --  5.9* 5.3*  --    ALBUMIN  --   --   --   --   --  3.8 2.5* 2.5*   BILITOTAL  --   --   --   --   --  1.7* 0.9  --    ALKPHOS  --   --   --   --   --  127 116  --    AST  --   --   --   --   --  56* 41  --    ALT  --   --   --   --   --  37 34  --    < > = values in this interval not displayed.  CBC  Recent Labs  Lab 07/20/17  0741 07/19/17  0721 07/18/17  1944 07/18/17  0753 07/17/17  0706   WBC 3.0* 2.6*  --  2.0* 2.2*   RBC 2.88* 2.74*  --  2.70* 2.94*   HGB 9.0* 8.6*  --   8.4* 8.8*   HCT 27.6* 26.2* 27.5* 25.7* 27.9*   MCV 96 96  --  95 95   MCH 31.3 31.4  --  31.1 29.9   MCHC 32.6 32.8  --  32.7 31.5   RDW 27.8* 27.7*  --  27.6* 27.2*   PLT 31* 29*  --  31* 34*     INR  Recent Labs  Lab 07/16/17  0734   INR 1.41*     Arterial Blood GasNo lab results found in last 7 days.      Betty Patrick  Pager: [phone]  [facility] Inpatient Team Consult pager [phone] (M-F 8-4:30)  After-hours Answering Service [phone]  Palliative Clinic: [phone]     Total time spent was 90 minutes,  >50% of time was spent counseling and/or coordination of care regarding symptoms, support.     [phone]

## 2019-07-29 DIAGNOSIS — I50.33 ACUTE ON CHRONIC DIASTOLIC HEART FAILURE (H): ICD-10-CM

## 2019-07-29 NOTE — TELEPHONE ENCOUNTER
"SPIRONOLACTONE 25MG TABLETS      Last Written Prescription Date:  11/6/18  Last Fill Quantity: 90,   # refills: 2  Last Office Visit: 10/4/18  Future Office visit:       Requested Prescriptions   Pending Prescriptions Disp Refills     spironolactone (ALDACTONE) 25 MG tablet [Pharmacy Med Name: SPIRONOLACTONE 25MG TABLETS] 90 tablet 0     Sig: TAKE 1 TABLET BY MOUTH DAILY       Diuretics (Including Combos) Protocol Passed - 7/29/2019  3:28 AM        Passed - Blood pressure under 140/90 in past 12 months     BP Readings from Last 3 Encounters:   03/26/19 111/82   02/28/19 118/82   02/22/19 118/83                 Passed - Recent (12 mo) or future (30 days) visit within the authorizing provider's specialty     Patient had office visit in the last 12 months or has a visit in the next 30 days with authorizing provider or within the authorizing provider's specialty.  See \"Patient Info\" tab in inbasket, or \"Choose Columns\" in Meds & Orders section of the refill encounter.              Passed - Medication is active on med list        Passed - Patient is age 18 or older        Passed - No active pregancy on record        Passed - Normal serum creatinine on file in past 12 months     Recent Labs   Lab Test 03/26/19  1126   CR 0.72              Passed - Normal serum potassium on file in past 12 months     Recent Labs   Lab Test 03/26/19  1126   POTASSIUM 3.8                    Passed - Normal serum sodium on file in past 12 months     Recent Labs   Lab Test 03/26/19  1126                 Passed - No positive pregnancy test in past 12 months          "

## 2019-07-31 RX ORDER — SPIRONOLACTONE 25 MG/1
TABLET ORAL
Qty: 90 TABLET | Refills: 0 | Status: SHIPPED | OUTPATIENT
Start: 2019-07-31 | End: 2019-10-27

## 2019-07-31 NOTE — TELEPHONE ENCOUNTER
Prescription approved per [facility] Refill Protocol.    Jessica KNOTT RN

## 2019-09-03 NOTE — PROGRESS NOTES
"  Interventional Cardiology Progress Note  Subjective:  - No acute events overnight, denies any chest pain, pressure, heaviness or tightness. Her breathing is better  - Tele showed a 12 bt run of AFib as well as up to 21 PAC's a min.  - MRI cervical spine showed no e/o spinal cord injury, and MRI brain didn't reveal any anoxic brain injury    Objective:  Most recent vital signs:  /71 (BP Location: Right arm)  Pulse 103  Temp 98.2  F (36.8  C) (Oral)  Resp 18  Ht 1.473 m (4' 10\")  Wt 82.3 kg (181 lb 8 oz)  SpO2 97%  BMI 37.93 kg/m2  Temp:  [98.2  F (36.8  C)-98.4  F (36.9  C)] 98.2  F (36.8  C)  Heart Rate:  [62-84] 66  Resp:  [17-18] 18  BP: (105-130)/(53-80) 105/71  SpO2:  [97 %-99 %] 97 %  Wt Readings from Last 2 Encounters:   06/10/17 82.3 kg (181 lb 8 oz)   05/23/17 64.4 kg (141 lb 15.6 oz)         Intake/Output Summary (Last 24 hours) at 06/10/17 1411  Last data filed at 06/10/17 1409   Gross per 24 hour   Intake             1200 ml   Output             4225 ml   Net            -3025 ml       Physical exam:  General: Remains somewhat ill appearing, NAD.  HEENT: EOMI, PERRLA, no scleral icterus or injection.  CARDIAC: RRR, no m/r/g appreciated. Unable to assess peripheral pulses 2/2 foot drsg's intact b/l.   RESP: Lungs fairly clear, very diminished to the RLL, some scattered fine crackles to bases b/l.  GI: Abd distended, edematous, non-tender. Bowel sounds hypo x 4.  EXTREMITIES: MARSHA LE edema 2/2 drsgs in place, toes w/ + circulation/pink/warm. Femoral access site w/o bleeding or mass, no bruits noted.  SKIN: Left arm drsg in place as well as drsg/wraps to bilateral LE.   NEURO: Alert and oriented X3, CN II-XII grossly intact, no focal neurological deficits noted.    Drains and Tubes: No drains or tubes present  Access: No central lines or devices in place    Labs (Past three days):  CBC    Recent Labs  Lab 06/10/17  1229 06/09/17  1708 06/09/17  0449 06/08/17  1649   WBC 3.8* 2.9* 2.8* 2.9*   RBC " 3.43* 3.28* 2.03* 2.47*   HGB 11.0* 10.4* 6.9* 8.4*   HCT 32.6* 32.1* 20.7* 25.5*   MCV 95 98 102* 103*   MCH 32.1 31.7 34.0* 34.0*   MCHC 33.7 32.4 33.3 32.9   RDW 19.9* 19.6* 19.2* 19.1*   PLT 27* 23* 28* 30*     BMP  Recent Labs  Lab 06/10/17  1229 06/09/17  1708 06/09/17  0449 06/08/17  1649  06/07/17  0422 06/06/17  1654 06/06/17  1156 06/06/17  0409 06/05/17  1941    138 142 142  < > 148* 146*  --  146*  --    POTASSIUM 3.7 3.9 4.1 4.0  < > 3.6 4.1 3.6 3.5 3.8   CHLORIDE 99 104 107 107  < > 108 106  --  107  --    CO2 30 30 33* 30  < > 36* 36*  --  36*  --    ANIONGAP 9 4 2* 5  < > 5 4  --  4  --    * 149* 112* 146*  < > 135* 144*  --  138*  --    BUN 27 28 31* 28  < > 37* 40*  --  39*  --    CR 0.70 0.67 0.74 0.73  < > 0.75 0.78  --  0.84  --    GFRESTIMATED 86 >90Non  GFR Calc 81 83  < > 80 77  --  70  --    GFRESTBLACK >90African American GFR Calc >90African American GFR Calc >90African American GFR Calc >90African American GFR Calc  < > >90African American GFR Calc >90African American GFR Calc  --  85  --    VIKTORIYA 7.5* 7.5* 7.5* 7.8*  < > 7.4* 7.4*  --  7.5*  --    MAG  --   --   --   --   --  1.9  --  2.0 2.4* 2.0   PHOS  --   --   --   --   --  3.3 1.7* 2.1* 2.6 2.0*   < > = values in this interval not displayed.  INR    Recent Labs  Lab 06/09/17  0449 06/08/17  0320 06/07/17  0422 06/06/17  0409   INR 1.22* 1.23* 1.27* 1.27*     Liver panel    Recent Labs  Lab 06/05/17  0354   PROTTOTAL 4.2*   ALBUMIN 1.9*   BILITOTAL 1.7*   ALKPHOS 149   AST 43   *       Imaging/procedure results:  06/07 US arterial Duplex:   1. Resolution of occluded left radial artery.  2. Focal stenosis of the right radial artery at the wrist with peak systolic velocity 486 cm/s.  3. 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      Assessment & Plan:  # Hypoxic respiratory failure  - s/p 7-day course of  meropenem/ertapenem (05/30-06/04)  - Continue additional furosemide 60 mg IV BID today and follow electrolytes      # Out-of-hospital cardiac arrest and cardiogenic shock; Hx of AFib/Aflutter/SVT; sinus arrest  - Continue digoxin at 0.25 mg q24h given multiple episodes of AFib/AFlutter  - Tolerating BB but GI upset w/ Metop - changed to Propanolol  - ICD before discharge once stable from hematologic standpoint      # Severe critical illness myopathy  - OT/PT  - Neuro consult requested per PMR and rehabilitation planning, appreciate recs; appreciate recs      # Severe pancytopenia  - Platelet goal >20K - will follow         - A/w haptoglobin   - Hematology following, appreciate recs    # Anemia  - Monitor Hb  - Consulted Rheumatology in setting of RA history (holding RA medications 2/2 severe pancytopenia and anemia); appreciate recs     # Ischemic liver injury complicated by coagulopathy  - Feeding via NGT      Chronic/inactive issues:  # ESBL UTI, aspiration pneumonia: s/p meropenem/ertapenem course  # CELSA: Baseline Cr 0.7-0.8; peak Cr 1.9  # RA: Weaning of stress dosed steroids completed 06/04; hold immunosuppressants   # Seizure: levetiracetam discontinued 06/05     [place] Haas MD  Cardiovascular Disease Fellow  Pager: [phone]      Patient seen and discussed w/ Dr. Wood.     Post-Care Instructions: I reviewed with the patient in detail post-care instructions. Patient is to keep the biopsy site dry overnight, and then apply bacitracin twice daily until healed. Patient may apply hydrogen peroxide soaks to remove any crusting. Biopsy Method: curette Billing Type: Third-Party Bill Consent: Written consent was obtained and risks were reviewed including but not limited to scarring, infection, bleeding, scabbing, incomplete removal, nerve damage and allergy to anesthesia. Destruction After The Procedure: No Wound Care: Vaniply Depth Of Biopsy: dermis X Size Of Lesion In Cm: 0 Notification Instructions: Patient will be notified of biopsy results. However, patient instructed to call the office if not contacted within 2 weeks. Cryotherapy Text: The wound bed was treated with cryotherapy after the biopsy was performed. Electrodesiccation Text: The wound bed was treated with electrodesiccation after the biopsy was performed. Hemostasis: Drysol Dressing: bandage Anesthesia Type: 1% lidocaine with epinephrine Silver Nitrate Text: The wound bed was treated with silver nitrate after the biopsy was performed. Biopsy Type: H and E Anesthesia Volume In Cc: 1 Was A Bandage Applied: Yes Detail Level: Detailed Electrodesiccation And Curettage Text: The wound bed was treated with electrodesiccation and curettage after the biopsy was performed. Type Of Destruction Used: Curettage

## 2019-09-05 ENCOUNTER — DOCUMENTATION ONLY (OUTPATIENT)
Dept: CARE COORDINATION | Facility: CLINIC | Age: 59
End: 2019-09-05

## 2019-09-06 ENCOUNTER — TELEPHONE (OUTPATIENT)
Dept: RHEUMATOLOGY | Facility: CLINIC | Age: 59
End: 2019-09-06

## 2019-09-10 ENCOUNTER — APPOINTMENT (OUTPATIENT)
Dept: LAB | Facility: CLINIC | Age: 59
End: 2019-09-10
Attending: INTERNAL MEDICINE
Payer: COMMERCIAL

## 2019-09-10 ENCOUNTER — ANCILLARY PROCEDURE (OUTPATIENT)
Dept: CT IMAGING | Facility: CLINIC | Age: 59
End: 2019-09-10
Attending: INTERNAL MEDICINE
Payer: COMMERCIAL

## 2019-09-10 ENCOUNTER — OFFICE VISIT (OUTPATIENT)
Dept: TRANSPLANT | Facility: CLINIC | Age: 59
End: 2019-09-10
Attending: INTERNAL MEDICINE
Payer: COMMERCIAL

## 2019-09-10 VITALS
HEART RATE: 89 BPM | WEIGHT: 136.3 LBS | BODY MASS INDEX: 28.49 KG/M2 | SYSTOLIC BLOOD PRESSURE: 125 MMHG | OXYGEN SATURATION: 97 % | RESPIRATION RATE: 16 BRPM | TEMPERATURE: 98.1 F | DIASTOLIC BLOOD PRESSURE: 74 MMHG

## 2019-09-10 DIAGNOSIS — C83.398 DIFFUSE LARGE B-CELL LYMPHOMA OF EXTRANODAL SITE: ICD-10-CM

## 2019-09-10 DIAGNOSIS — I48.92 ATRIAL FLUTTER, UNSPECIFIED TYPE (H): Primary | ICD-10-CM

## 2019-09-10 LAB
ALBUMIN SERPL-MCNC: 3.9 G/DL (ref 3.4–5)
ALP SERPL-CCNC: 77 U/L (ref 40–150)
ALT SERPL W P-5'-P-CCNC: 42 U/L (ref 0–50)
ANION GAP SERPL CALCULATED.3IONS-SCNC: 5 MMOL/L (ref 3–14)
AST SERPL W P-5'-P-CCNC: 34 U/L (ref 0–45)
BASOPHILS # BLD AUTO: 0 10E9/L (ref 0–0.2)
BASOPHILS NFR BLD AUTO: 0.4 %
BILIRUB SERPL-MCNC: 0.7 MG/DL (ref 0.2–1.3)
BUN SERPL-MCNC: 18 MG/DL (ref 7–30)
CALCIUM SERPL-MCNC: 8.9 MG/DL (ref 8.5–10.1)
CHLORIDE SERPL-SCNC: 102 MMOL/L (ref 94–109)
CO2 SERPL-SCNC: 27 MMOL/L (ref 20–32)
CREAT BLD-MCNC: 0.9 MG/DL (ref 0.52–1.04)
CREAT SERPL-MCNC: 0.79 MG/DL (ref 0.52–1.04)
DIFFERENTIAL METHOD BLD: ABNORMAL
DIGOXIN SERPL-MCNC: 0.8 UG/L (ref 0.5–2)
EOSINOPHIL # BLD AUTO: 0 10E9/L (ref 0–0.7)
EOSINOPHIL NFR BLD AUTO: 0.4 %
ERYTHROCYTE [DISTWIDTH] IN BLOOD BY AUTOMATED COUNT: 12.6 % (ref 10–15)
GFR SERPL CREATININE-BSD FRML MDRD: 64 ML/MIN/{1.73_M2}
GFR SERPL CREATININE-BSD FRML MDRD: 82 ML/MIN/{1.73_M2}
GLUCOSE SERPL-MCNC: 109 MG/DL (ref 70–99)
HCT VFR BLD AUTO: 43.1 % (ref 35–47)
HGB BLD-MCNC: 14.8 G/DL (ref 11.7–15.7)
IMM GRANULOCYTES # BLD: 0 10E9/L (ref 0–0.4)
IMM GRANULOCYTES NFR BLD: 0.3 %
LYMPHOCYTES # BLD AUTO: 0.5 10E9/L (ref 0.8–5.3)
LYMPHOCYTES NFR BLD AUTO: 6.5 %
MCH RBC QN AUTO: 36.2 PG (ref 26.5–33)
MCHC RBC AUTO-ENTMCNC: 34.3 G/DL (ref 31.5–36.5)
MCV RBC AUTO: 105 FL (ref 78–100)
MONOCYTES # BLD AUTO: 0.6 10E9/L (ref 0–1.3)
MONOCYTES NFR BLD AUTO: 8.1 %
NEUTROPHILS # BLD AUTO: 6.1 10E9/L (ref 1.6–8.3)
NEUTROPHILS NFR BLD AUTO: 84.3 %
NRBC # BLD AUTO: 0 10*3/UL
NRBC BLD AUTO-RTO: 0 /100
PLATELET # BLD AUTO: 158 10E9/L (ref 150–450)
POTASSIUM SERPL-SCNC: 3.8 MMOL/L (ref 3.4–5.3)
PROT SERPL-MCNC: 7.6 G/DL (ref 6.8–8.8)
RBC # BLD AUTO: 4.09 10E12/L (ref 3.8–5.2)
SODIUM SERPL-SCNC: 134 MMOL/L (ref 133–144)
WBC # BLD AUTO: 7.3 10E9/L (ref 4–11)

## 2019-09-10 PROCEDURE — 82784 ASSAY IGA/IGD/IGG/IGM EACH: CPT | Performed by: INTERNAL MEDICINE

## 2019-09-10 PROCEDURE — G0463 HOSPITAL OUTPT CLINIC VISIT: HCPCS | Mod: 25

## 2019-09-10 PROCEDURE — 80053 COMPREHEN METABOLIC PANEL: CPT | Performed by: INTERNAL MEDICINE

## 2019-09-10 PROCEDURE — 85025 COMPLETE CBC W/AUTO DIFF WBC: CPT | Performed by: INTERNAL MEDICINE

## 2019-09-10 PROCEDURE — 90471 IMMUNIZATION ADMIN: CPT

## 2019-09-10 PROCEDURE — 80162 ASSAY OF DIGOXIN TOTAL: CPT | Performed by: INTERNAL MEDICINE

## 2019-09-10 PROCEDURE — 90750 HZV VACC RECOMBINANT IM: CPT | Mod: ZF | Performed by: INTERNAL MEDICINE

## 2019-09-10 PROCEDURE — G0463 HOSPITAL OUTPT CLINIC VISIT: HCPCS | Mod: ZF

## 2019-09-10 PROCEDURE — 25000581 ZZH RX MED A9270 GY (STAT IND- M) 250: Mod: ZF | Performed by: INTERNAL MEDICINE

## 2019-09-10 RX ORDER — IOPAMIDOL 755 MG/ML
80 INJECTION, SOLUTION INTRAVASCULAR ONCE
Status: COMPLETED | OUTPATIENT
Start: 2019-09-10 | End: 2019-09-10

## 2019-09-10 RX ADMIN — IOPAMIDOL 80 ML: 755 INJECTION, SOLUTION INTRAVASCULAR at 10:42

## 2019-09-10 RX ADMIN — ZOSTER VACCINE RECOMBINANT, ADJUVANTED 0.5 ML: KIT at 13:51

## 2019-09-10 NOTE — PROGRESS NOTES
[facility] Visit  9/10/2019         Disease and Treatment History:  1. Complicated patient with history of Rheumatoid Arthritis status post long term treatment with methotrexate with recent significant complicated course with cardiac arrest, admission with hypotension, sepsis, ICU stay and intubation with subsequent additional readmission from TCU in 6/2017 for FUO with extensive work-up with eventual finding of progression cytopenias with eventual lymphoma diagnosis  2. Bone Marrow Biopsy: 7/12/2017: Highly suspicious for involvement by B cell lymphoma with foci of large atypical CD20+ cells  3. PET CT 7/19/2017: Extensive activity: Right paratracheal lesion with SUV 28, many liver lesions with SUV 15, cardiac lesion intraarterial septum, numerous bone lesions.  4. 7/21 Liver Biopsy: Consistent with Diffuse Large B Cell Lymphoma non-germinal center phenotype  -- FISH for myc, bcl2, bcl6 negative for double-hit lymphoma  5. IPI based on Age < 60 (0 points) + PS 2 (1 + point) + Stage IV Disease (1 point) + Extranodal disease > 1 site (1 point) + elevated LDH (1 point) = 4 Poor Risk Disease  6. Cycle 1 given as R-EPOCH Day 1 = 7/27/2017  -- complications of transfusion dependence and need for acute rehab placement (likely more result of extensive hospital stays)  - LP negative for CNS involvement 8/23/2017  7. Cycle #2: Transition to R-CHOP Day 1 = 8/22/2017  - Day 15 high dose MTX for CNS prophylaxis  - complicated by significant fluid overload and PICC associated DVT  8. Cycle #3 Day 1 = 9/20/2017 Post Cycle 3 PET CR. Cycle 4 10/11/2017 + IT prophylaxis, Cycle 5 11/8/17, Cycle 6 11/29/2017 + IT prophylaxis  -- Post Treatment completion PET 1/15/2018: Complete Remission      HPI: Amelia and her  come in today for lymphoma follow-up. Since I last saw her she notes she has stable neuropathy, no new MCKAYLA, no night sweats, no B symptoms. Mood a little low at times and trying to figure out things to do  with her time. No cardiac symptoms, no SOB.        10 point ROS otherwise negative      Physical Exam:  /74   Pulse 89   Temp 98.1  F (36.7  C) (Oral)   Resp 16   Wt 61.8 kg (136 lb 4.8 oz)   SpO2 97%   BMI 28.49 kg/m      Gen: looks well. KPS 80  HEENT: OP clear, no erythema or thrush, no palpable MCKAYLA  Lungs:CTAB no crackles or wheezes  CV: stable loud systolic murmur. Regular on exam today  Abd:soft  Ext:no edema. Brace on right leg. Stockings on        Labs:  Results for FIDELIA MIDDLETON (MRN [id number]) as of 9/10/2019 13:32   Ref. Range 9/10/2019 12:15   Sodium Latest Ref Range: 133 - 144 mmol/L 134   Potassium Latest Ref Range: 3.4 - 5.3 mmol/L 3.8   Chloride Latest Ref Range: 94 - 109 mmol/L 102   Carbon Dioxide Latest Ref Range: 20 - 32 mmol/L 27   Urea Nitrogen Latest Ref Range: 7 - 30 mg/dL 18   Creatinine Latest Ref Range: 0.52 - 1.04 mg/dL 0.79   GFR Estimate Latest Ref Range: >60 mL/min/1.73_m2 82   GFR Estimate If Black Latest Ref Range: >60 mL/min/1.73_m2 >90   Calcium Latest Ref Range: 8.5 - 10.1 mg/dL 8.9   Anion Gap Latest Ref Range: 3 - 14 mmol/L 5   Albumin Latest Ref Range: 3.4 - 5.0 g/dL 3.9   Protein Total Latest Ref Range: 6.8 - 8.8 g/dL 7.6   Bilirubin Total Latest Ref Range: 0.2 - 1.3 mg/dL 0.7   Alkaline Phosphatase Latest Ref Range: 40 - 150 U/L 77   ALT Latest Ref Range: 0 - 50 U/L 42   AST Latest Ref Range: 0 - 45 U/L 34   Glucose Latest Ref Range: 70 - 99 mg/dL 109 (H)   WBC Latest Ref Range: 4.0 - 11.0 10e9/L 7.3   Hemoglobin Latest Ref Range: 11.7 - 15.7 g/dL 14.8   Hematocrit Latest Ref Range: 35.0 - 47.0 % 43.1   Platelet Count Latest Ref Range: 150 - 450 10e9/L 158   RBC Count Latest Ref Range: 3.8 - 5.2 10e12/L 4.09   MCV Latest Ref Range: 78 - 100 fl 105 (H)   MCH Latest Ref Range: 26.5 - 33.0 pg 36.2 (H)   MCHC Latest Ref Range: 31.5 - 36.5 g/dL 34.3   RDW Latest Ref Range: 10.0 - 15.0 % 12.6   Diff Method Unknown Automated Method   % Neutrophils Latest Units: %  84.3   % Lymphocytes Latest Units: % 6.5   % Monocytes Latest Units: % 8.1   % Eosinophils Latest Units: % 0.4   % Basophils Latest Units: % 0.4   % Immature Granulocytes Latest Units: % 0.3   Nucleated RBCs Latest Ref Range: 0 /100 0   Absolute Neutrophil Latest Ref Range: 1.6 - 8.3 10e9/L 6.1   Absolute Lymphocytes Latest Ref Range: 0.8 - 5.3 10e9/L 0.5 (L)   Absolute Monocytes Latest Ref Range: 0.0 - 1.3 10e9/L 0.6   Absolute Eosinophils Latest Ref Range: 0.0 - 0.7 10e9/L 0.0   Absolute Basophils Latest Ref Range: 0.0 - 0.2 10e9/L 0.0   Abs Immature Granulocytes Latest Ref Range: 0 - 0.4 10e9/L 0.0   Absolute Nucleated RBC Unknown 0.0     CT Scans:  IMPRESSION:   1. No lymphadenopathy in the chest, abdomen or pelvis.  2. Stable cardiomegaly and small pericardial effusion.  3. Heterogeneous enhancement of the liver, similar to the prior exam,  which may be related to passive congestion secondary to cardiac  Dysfunction.    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 Lymphoma: Got cycle #1 in the hospital and I chose R-EPOCH for infusional nature due to possible intracardiac involvement and also extensive disease to allow for slower lymphoma kill. Tolerated this well, aside from significant cytopenias (which were present at diagnosis). Then transitioned to R-CHOP to finish out a total of 6 cycles of chemotherapy (1 R-EPOCH and 5 R-CHOP) with initially planned for High Dose MTX on Day 15 of cycle 2,4, and 6 due to high IPI but then transition to prophy IT chemo on cycle 4 and 6 due to toxicity with the high dose methotrexate.   -- PET CT post 3 cycles CR1 and repeat PET CT post therapy completion in 1/2018 showed CR1.     -- Follow-up scans now show ongoing CR. Final CT imaging today (2 years post diagnosis) and then prn going forward. To my eye the scans look good with no MCKAYLA. Will send final CT results when  results    Addendum: final report clear of MCKAYLA    2. ID:  Not on any abx at this point   - Shingrix vaccine booster today    3. Rheumatoid arthritis: is on prednisone 5 mg daily and plaquenil. Stable    4. CV: A. Fib. On digoxin and atenolol. Status post numerous ablations as above but best control seems to correlate with the digoxin. Currently in normal rate and rhythm  -- Pericardial effusion noted during hospitalization in 3/2018 and pericarditis on 3/8/2018 MRI. Repeat MRI 4/12/2018 showed ongoing pericardial effusion with the start of organization felt to be ongoing inflammation. CT Chest today shows no recurrence of this    5. Heme: Counts good today.    5. Wound: status post surgery on 5/22/18 and has healed    7. Neuropathy: on gabapentin. Stable. No worsening but no significant improvement either          Final Plan:  - cameron 6 months with labs prior       Lenore Rodriguez

## 2019-09-10 NOTE — NURSING NOTE
"Oncology Rooming Note    September 10, 2019 12:51 PM   Amelia Michel is a 58 year old female who presents for:    Chief Complaint   Patient presents with     Blood Draw     Labs drawn via PIV by RN in lab. VS taken.      Oncology Clinic Visit     Return: DLBCL      Initial Vitals: /74   Pulse 89   Temp 98.1  F (36.7  C) (Oral)   Resp 16   Wt 61.8 kg (136 lb 4.8 oz)   SpO2 97%   BMI 28.49 kg/m   Estimated body mass index is 28.49 kg/m  as calculated from the following:    Height as of 3/26/19: 1.473 m (4' 10\").    Weight as of this encounter: 61.8 kg (136 lb 4.8 oz). Body surface area is 1.59 meters squared.  Data Unavailable Comment: Data Unavailable   No LMP recorded. Patient is postmenopausal.  Allergies reviewed: Yes  Medications reviewed: Yes    Medications: Medication refills not needed today.  Pharmacy name entered into [facility]: [id number] - [place], [place] - [address] AT [facility] [address]    Clinical concerns: None       Samia Ozuna CMA              "

## 2019-09-10 NOTE — NURSING NOTE
Chief Complaint   Patient presents with     Blood Draw     Labs drawn via PIV by RN in lab. VS taken.      Labs drawn via peripheral IV. Vital signs taken. Checked into next appointment.   Kalpana Hughes RN

## 2019-09-11 ENCOUNTER — OFFICE VISIT (OUTPATIENT)
Dept: RHEUMATOLOGY | Facility: CLINIC | Age: 59
End: 2019-09-11
Attending: NURSE PRACTITIONER
Payer: COMMERCIAL

## 2019-09-11 VITALS
OXYGEN SATURATION: 97 % | DIASTOLIC BLOOD PRESSURE: 79 MMHG | TEMPERATURE: 98.8 F | HEART RATE: 91 BPM | BODY MASS INDEX: 28.76 KG/M2 | WEIGHT: 137.6 LBS | SYSTOLIC BLOOD PRESSURE: 118 MMHG

## 2019-09-11 DIAGNOSIS — G72.81 CRITICAL ILLNESS MYOPATHY: ICD-10-CM

## 2019-09-11 DIAGNOSIS — M05.79 RHEUMATOID ARTHRITIS INVOLVING MULTIPLE SITES WITH POSITIVE RHEUMATOID FACTOR (H): Primary | ICD-10-CM

## 2019-09-11 DIAGNOSIS — M54.50 CHRONIC RIGHT-SIDED LOW BACK PAIN WITHOUT SCIATICA: ICD-10-CM

## 2019-09-11 DIAGNOSIS — M81.8 STEROID-INDUCED OSTEOPOROSIS: ICD-10-CM

## 2019-09-11 DIAGNOSIS — T38.0X5A STEROID-INDUCED OSTEOPOROSIS: ICD-10-CM

## 2019-09-11 DIAGNOSIS — G62.9 NEUROPATHY: ICD-10-CM

## 2019-09-11 DIAGNOSIS — Z79.52 LONG TERM (CURRENT) USE OF SYSTEMIC STEROIDS: ICD-10-CM

## 2019-09-11 DIAGNOSIS — G89.29 CHRONIC RIGHT-SIDED LOW BACK PAIN WITHOUT SCIATICA: ICD-10-CM

## 2019-09-11 LAB — IGG SERPL-MCNC: 1080 MG/DL (ref 695–1620)

## 2019-09-11 PROCEDURE — G0463 HOSPITAL OUTPT CLINIC VISIT: HCPCS | Mod: ZF

## 2019-09-11 RX ORDER — GABAPENTIN 100 MG/1
200 CAPSULE ORAL 3 TIMES DAILY
Qty: 540 CAPSULE | Refills: 1 | Status: SHIPPED | OUTPATIENT
Start: 2019-09-11 | End: 2019-12-08

## 2019-09-11 RX ORDER — HYDROXYCHLOROQUINE SULFATE 200 MG/1
200 TABLET, FILM COATED ORAL DAILY
Qty: 90 TABLET | Refills: 3 | COMMUNITY
Start: 2019-09-11 | End: 2019-11-14

## 2019-09-11 RX ORDER — PREDNISONE 5 MG/1
5 TABLET ORAL DAILY
Qty: 90 TABLET | Refills: 1 | Status: SHIPPED | OUTPATIENT
Start: 2019-09-11 | End: 2020-03-11

## 2019-09-11 ASSESSMENT — PAIN SCALES - GENERAL: PAINLEVEL: MODERATE PAIN (4)

## 2019-09-11 NOTE — LETTER
9/11/2019      RE: Amelia Michel  [address]  [place] [address]       [facility] - Rheumatology Clinic Visit    Amelia Michel  is a 58 year old here for transfer of care for rheumatoid arthritis (RA) dx 35 y/o. +CCP >331 -RF -KALYAN panel. 5-2108 XR 5-2018 DDD and facet osteoarthritis, congential fusion C2-3    Review-Dr. Dumas in [facility] for many years. She had relatively inactive disease prior to her arrest but was on methotrexate 10 mg PO per week, Arava 10 mg daily, Plaquenil 200 mg twice per day, and prednisone 3 mg daily. She has had partial fusion of her right wrist. After discharge from the hospital she was put on prednisone 5 mg daily as monotherapy. Sulfasalazine -not effective long-time, initial alan but resolved after retried. Past Dr. Cunha and Dr. 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 Treated concervatively with plaquenil and 5mg prednisone, and not interested in rituximab or other biologics. She has previously been treated with sulfasalazine and initially developed a rash which was initially thought to be a sulfa rash, but her rash resolved and did not reoccur after being placed back on sulfasalazine. She was also treated with Arava, but this discontinued during her cancer treatment. She feels that these drugs have had no effect on her symptoms.       Copy forward  Dr. Pritchett 2-2019:      Interval history 3/30/18: On 3/30/18, her rheumatoid arthritis appeared to be under good control. Her plan at this time was to continue prednisone 5mg daily and HCQ 200mg twice a day. She was told to have an eye exam and follow up in 3 months. It was recommended that she get a DEXA scan to evaluate whether bisphosphonate was indicated (had 7-8 years of Fosamax in early 2000's). Previous use of methotrexate has been linked to her development of a severe  "neuropathy in the digits of her upper and lower extremities. She has also previously used arava.    Interval history 5/31/18: The patient reports spraining her ankle prior to her cardiac arrest, which caused some neuropathy in her sprained foot. Therefore she is currently wearing an ankle brace and using a walker. However, her arthritis is doing fine she reports that the colchicine she was recently prescribed has been very effective. She mentions that she is switching insurance companies and had an unexpected arm surgery, which has prevented her from getting an eye exam that she is aware of being overdue for. She was also laid off recently but was able to fill the majority of her medications and made the most of her previous insurance so she should be able to manage for a while. She has undergone multiple CT scans and is currently finished with chemotherapy. Lymphoma is in remission and her heart is doing fine. She is interested in the options she has for medications that manage her rheumatoid arthritis and her lymphoma. She has taken many medications including plaquenil, prednisone, arava, methotrexate, orencia, and rituximab, and has plenty of options to choose from if she wants to lower the cost of prescriptions or avoid unwanted side-effects. The plan at this time was to continue prednisone 5mg daily.     2-2019: Today, Amelia reports that she has had increased pain in her right shoulder, primarily losing rande of motion to extension. Notes that this has gotten worse after colchicine (for pericarditis) was stopped. She currently wears a brace on her feet to treat neuropathy, and uses a walker. She notes that these braces alter her gait, and as a result, has had some knee and low back pain, worse towards the end of the day.    Initial visit September 11, 2019  Frederic Gongora APRN:   Chronic low back pain into right knee, more this past month lumbar then right sided \"tennis ball\". Not evaluated over the time. Right " "foot neuropathy, with brace and some in left foot, left hand neuropathy chronic \"amiodorone infiltrated\". Chronic right hand arthralgia.     Gait impaired, walker for fear of falling since cardiac arrest 5/2017 lymphoma in heart led of afib long-term rehab, refractory afib before that. S/p cardioversion. Bone marrow bx 7/2017. Treatment July 2017 to 11/29/2017 (IV and intrathecal) think neuropathy came from, cardiomegaly from this and heart failure. EF 60-65% 9-2017, some effusion. Colchicine during this one a day and 'arthritis did great with that\". Never gone down on hydroxychloroquine (plaquenil)     Weather more arthralgia. Prednisone 5 mg once day, hydroxychloroquine (plaquenil) 200 mg BID , gabapentin 200 Mg TID very effective no side-effects, no vision issues. Caltrate BID, fosamax 70 mg every 7 day, right foot to 2\" strip to outside leg chronic stops at hip (one day had sattel anesthesia-image negative, did see neurology, who started gabapentin 7-2016), bilateral arches feet, left hand neuropathy is chronic left 1-3 fingers no feeling or the back side arm to elbow. No vision issues. No falls or injuries. No neck issues.           PMH:  Injury-left thumb amputation, left finger broke  Medical-  Antiplatelet or antithrombotic long-term use  Arrhythmia  Atrial tachycardia, flutter  Paroxysmal atrial fibrillation  Arthritis  Lymphoma  Cardiac arrest  history of chemotherapy  Primary pulmonary hypertension  Neuropathy  Postoperative nausea and vomiting  Rheumatoid arthritis  Hyperlipidemia LDL goal <130  Lymphedema of both lower extremities  Cardiogenic shock and cardiac arrest 5/2017  Acute respiratory failure with hypoxia  Critical illness myopathy  Sepsis  Wound of left upper extremity  Diffuse large B-cell lymphoma of extranodal site  Febrile neutropenia  Physical deconditioning  Palpitations  Osteoporosis  -DEXA 2018   Slowly healing wound in medial left antecubital fossa after traumatic IV  Neuropathy of " "lower extremities attributed to high dose methotrexate, latter felt from lymphoma   Neuropathy of right foot with weakness after intrathecal cytarabine  perioditis   Right arm PICC blood clots during cancer treatments      Past Surgical History:  Anesthesia cardioversion  Right wrist arthrodesis, partial fusion \"history migrated out\"  Bone marrow aspirate & biopsy  Excise lesion upper extremity - left wound excision and closure, possible submuscular transposition of ulnar nerve  Foot surgery - 4 left, 2 right  Carpal tunnel bilateral release  Repair ventricular septal defect  Transpositional ulnar nerve (elbow) left   Right shoulder effusion history     Family History:   No autoimmune disorders, psoriasis, UC, crohn's, SLE, RA, PsA, gout, autoimmune thyroid.  No MS, heart disease in family  Mother - breast cancer, hypertension, alzheimer disease-passed   Father - hypertension, lymphoma, circulatory A fib-passed  Paternal grandmother - diabetes  Siblings-younger sister heatlhy PB, younger brother think arthritis not dx PB     Social History:   No smoking or tobacco use. No alcohol use. No IVDU. None Children. Right handed. . Disability      PMSH personally reviewed and updated by me.    ROS:  Negative raynaud s phenomena, hairloss, sun sensitivities, pleurisy, inflammatory joint symptoms, significant rashes like malar, oral/nasal or ulcerations, inflammatory eye disease, inflammatory bowel disease, dactylitis, tenosynovitis, or enthespathy. Negative for miscarriages over 2 months into pregnancy, gout, psoriasis, UC, crohn's. No temporal headache, no jaw claudication, no scalp tenderness, vision changes, carotidynia, cough. No STD. No Parotid swelling. Denies international travel. Denies history of pneumonia, hepatitis, tuberculosis, shingles, tick bites or other infections.     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 No acute distress, swollen glands  EYES: No vision change, diplopia, pain in eyes or red eyes   EARS, NOSE, MOUTH, THROAT: No tinnitus or hearing change, no epistaxis or nasal discharge, no oral lesions, throat clear. Normal saliva pool.  No drymouth. No thyroid enlargement.   CARDIOVASCULAR: No chest pain, palpitations, or pain with walking, no orthopnea or PND   RESPIRATORY: No dyspnea, cough, shortness of breath or wheezing. No pleurisy.   GI: No nausea, vomiting, diarrhea or constipation, no abdominal pain, or blood in stools.   : No change in urine, no dysuria or hematuria   MUSCKL: No swollen, tender, red or painful joints. No nodules. No enthesitis, plantar fascitis or heel pain.   INTEGUMENTARY: No concerning lesions or moles   NEURO: No loss of strength or sensation, no numbness or tingling, no tremor, no dizziness, no headache. No falls   ENDO: No polyuria or polydipsia, no temperature intolerance   HEME/LYMPH:No concerning bumps, bleeding problems, or swollen lymph nodes. No recent infections, hospitalizations or new illnesses.   PSYCH:No depression or anxiety, no sleep problems.  Otherwise 14 point ROS obtained, reviewed and found negative.     Physical exam:  Vitals: Blood pressure 118/79, pulse 91, temperature 98.8  F (37.1  C), temperature source Oral, weight 62.4 kg (137 lb 9.6 oz), SpO2 97 %, not currently breastfeeding.  Wt Readings from Last 4 Encounters:   09/11/19 62.4 kg (137 lb 9.6 oz)   09/10/19 61.8 kg (136 lb 4.8 oz)   03/26/19 59 kg (130 lb)   02/28/19 62 kg (136 lb 11.2 oz)     Constitutional: WD-WN-WG cooperative. +cushionging  Eyes: nl EOM, PERRLA, vision, conjunctiva, sclera, No Unilateral or bilateral external ear inflammation   ENT: nl external ears, nose, hearing, lips, teeth, gums, throat. No saddle nose   Neck: no mass or thyroid enlargement  Resp: lungs clear to auscultation, nl to palpation, nl effort  CV: RRR, no murmurs, rubs or  gallops, no edema  GI: no ABD mass or tenderness, no HSM  : not tested  Lymph: no cervical or epitrochlear nodes  MS: Wrists with markedly limited ROM at baseline, no synovitis. Pannus right wrist, Several deformities present in right and left fingers, worse in right righ ulnar deviation. In particular, right 2nd and 3rd MCP joints with mild chronic pannus. Ulnar deviation bilaterally. Right z-thumb. -SLR All TMJ, neck, shoulder, elbow, wrist, hand, spine, hip, knee, ankle, and foot joints were examined and otherwise found normal. No active synovitis. Negative Lhermitte's sign. No periuncle erythema  Skin: No nail pitting, alopecia, rash, nodules or lesions; left elbow old scar and right hand.   Neuro: + chronic polyneuropathy with diminished sensation in BL LE.   Skin: no nail pitting, alopecia, rash  Neuro: nl cranial nerves, strength, sensation, DTRs.   Psych: nl judgement, orientation, memory, affect.      Labs/Imaging:  Eye Exam (10/23/18)  Significant for mild H-lated nuclear cataracts.   Ocular CT was recommended for right eye.     CT Chest/Abdomen/Pelvis (9/26/18)  In this patient with a history of lymphoma:  1. No evidence of disease recurrence, consistent with complete  response per Lugano criteria.  2. Stable cardiomegaly. Improved pericardial effusion.  3. Early contrast phase with heterogeneous visceral enhancement in the  abdomen, likely secondary to cardiac dysfunction.    MRI Cardiac w/contrast (4/12/18)  Loculated exudative pericardial effusion that is beginning to organize, with diffuse  pericardial enhancement consistent with ongoing inflammation. Normal LV fxn, LVEF 54% and RVEF 49%. At  least moderate, possibly severe tricuspid regurgitation. Compared to MRI from 03/2018, effusion is largely  unchanged in size (maybe a bit smaller) but is starting to organize. No change in pericardial enhancement.    Laboratory:     Results for orders placed or performed in visit on 09/10/19   CBC with  platelets differential   Result Value Ref Range    WBC 7.3 4.0 - 11.0 10e9/L    RBC Count 4.09 3.8 - 5.2 10e12/L    Hemoglobin 14.8 11.7 - 15.7 g/dL    Hematocrit 43.1 35.0 - 47.0 %     (H) 78 - 100 fl    MCH 36.2 (H) 26.5 - 33.0 pg    MCHC 34.3 31.5 - 36.5 g/dL    RDW 12.6 10.0 - 15.0 %    Platelet Count 158 150 - 450 10e9/L    Diff Method Automated Method     % Neutrophils 84.3 %    % Lymphocytes 6.5 %    % Monocytes 8.1 %    % Eosinophils 0.4 %    % Basophils 0.4 %    % Immature Granulocytes 0.3 %    Nucleated RBCs 0 0 /100    Absolute Neutrophil 6.1 1.6 - 8.3 10e9/L    Absolute Lymphocytes 0.5 (L) 0.8 - 5.3 10e9/L    Absolute Monocytes 0.6 0.0 - 1.3 10e9/L    Absolute Eosinophils 0.0 0.0 - 0.7 10e9/L    Absolute Basophils 0.0 0.0 - 0.2 10e9/L    Abs Immature Granulocytes 0.0 0 - 0.4 10e9/L    Absolute Nucleated RBC 0.0    Comprehensive metabolic panel   Result Value Ref Range    Sodium 134 133 - 144 mmol/L    Potassium 3.8 3.4 - 5.3 mmol/L    Chloride 102 94 - 109 mmol/L    Carbon Dioxide 27 20 - 32 mmol/L    Anion Gap 5 3 - 14 mmol/L    Glucose 109 (H) 70 - 99 mg/dL    Urea Nitrogen 18 7 - 30 mg/dL    Creatinine 0.79 0.52 - 1.04 mg/dL    GFR Estimate 82 >60 mL/min/[1.73_m2]    GFR Estimate If Black >90 >60 mL/min/[1.73_m2]    Calcium 8.9 8.5 - 10.1 mg/dL    Bilirubin Total 0.7 0.2 - 1.3 mg/dL    Albumin 3.9 3.4 - 5.0 g/dL    Protein Total 7.6 6.8 - 8.8 g/dL    Alkaline Phosphatase 77 40 - 150 U/L    ALT 42 0 - 50 U/L    AST 34 0 - 45 U/L   Digoxin level   Result Value Ref Range    Digoxin Level 0.8 0.5 - 2.0 ug/L       Impression/Plan:  1. Seropositive rheumatoid arthritis (, RF 31) with multiple joint disability including MTP fusion 1-5 bilaterally, partial right wrist fusion, subluxation and ulnar deformity of MCP's. Rheumatoid arthritis (RA) remission. Will continue prednisone, but reduce hydroxychloroquine (plaquenil) 200 mg once a day. Prn joint injection or a short burst of prednisone for  symptom relief (per her preference). She is not interested in using an alternative (leflunomide or rituximab) for RA.  Advise cervical x-rays prior to any surgeries, does have DDD (no symptoms at this time). Poor prognosis, discussed risk extraarticular and deformities risks and of AA instability, she understands.  2. Long-term hydroxychloroquine (plaquenil) use since 6-2017, OCT  no toxicity, reviewed AAO guidelines, repeat every year. Will reduce to 200 mg once a day   3. Diffuse large B-cell lymphoma status-post 1 cycle R-EPOCH, 6 cycles R-CHOP; per oncology for surveillance   4. Osteoporosis, chronic long-term corticosteroid use with cushingoid appearance. Received 6-7 years of alendronate in early 2000s. Restarted alendronate in 1/2019. DEXA  T-2.6 thoracic. This is likely safe after an extended drug holiday, vitamin D levels have also been normal. Continue vit D-Ca supplementation, walker for fall risk prevention   5. 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 Low back pain, no radiculopathy, referral to Physical Therapy. If any redflags, 911/ER. If not improved or new symptoms, would advise follow-up with PCP and do imaging (risk of compression fx with osteoporosis )  8. Loculated pericardial effusion, etiology unclear (lymphoma). No symptoms now. Per oncology  9. Atrial fibrillation, long-term anticoagulation per cardiology PCP     RTC 6 month  90 day prescriptions per insurance     The patient understood the rationale for the diagnosis and treatment plan. Patient shared in the decision making. All questions were answered to best of my ability and the patient's satisfaction and agrees with the plan.     Frederic Gongora APRN, CNP, MSN  [facility]  Department of Rheumatology & Autoimmune Disorders    1. Immunization: Reviewed CDC guidelines with patient updated, information provided to patient based on CDC  guidelines for vaccination recommendations, patient will discuss with primary provider-advised review with PCP and if needs repeat pneumonia 23 vaccine (last 2011)  2. Bone Health:Educated on adequate calcium and vitamin D intake, Educated on exercise, Glucocorticoid education discussed risks, benefits & potential long term side effects from use  3. Discussed routine annual physicals, cancer screening, cardiac risk monitoring, bone health and primary care with primary care provider. Included is glucocorticosteroid increase change of infections.   4. Educated on diagnosis, prognosis, labs, imaging, treatment options (including risks, benefits, risk of no treatment), medications (use, dose, side-effects, risks of medications including infection/cancer/bone marrow suppression, lab monitoring), infections what to do, plan of cares, goals of treatment. Clinic cards given. Websites given for resources and education   5. Educated in Rheumatology we do not prescribe narcotics or manage chronic pain, the patient will need to discuss with primary care or go to a pain clinic for management.      ELIZABETH Pearson CNP

## 2019-09-11 NOTE — PROGRESS NOTES
[facility] - Rheumatology Clinic Visit    Amelia Michel  is a 58 year old here for transfer of care for rheumatoid arthritis (RA) dx 33 y/o. +CCP >331 -RF -KALYAN panel. 5-2108 XR 5-2018 DDD and facet osteoarthritis, congential fusion C2-3    Review-Dr. Dumas in [facility] for many years. She had relatively inactive disease prior to her arrest but was on methotrexate 10 mg PO per week, Arava 10 mg daily, Plaquenil 200 mg twice per day, and prednisone 3 mg daily. She has had partial fusion of her right wrist. After discharge from the hospital she was put on prednisone 5 mg daily as monotherapy. Sulfasalazine -not effective long-time, initial alan but resolved after retried. Past Dr. Cunha and Dr. 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 Treated concervatively with plaquenil and 5mg prednisone, and not interested in rituximab or other biologics. She has previously been treated with sulfasalazine and initially developed a rash which was initially thought to be a sulfa rash, but her rash resolved and did not reoccur after being placed back on sulfasalazine. She was also treated with Arava, but this discontinued during her cancer treatment. She feels that these drugs have had no effect on her symptoms.       Copy forward  Dr. Pritchett 2-2019:      Interval history 3/30/18: On 3/30/18, her rheumatoid arthritis appeared to be under good control. Her plan at this time was to continue prednisone 5mg daily and HCQ 200mg twice a day. She was told to have an eye exam and follow up in 3 months. It was recommended that she get a DEXA scan to evaluate whether bisphosphonate was indicated (had 7-8 years of Fosamax in early 2000's). Previous use of methotrexate has been linked to her development of a severe neuropathy in the digits of her upper and lower extremities. She has also previously used  "arava.    Interval history 5/31/18: The patient reports spraining her ankle prior to her cardiac arrest, which caused some neuropathy in her sprained foot. Therefore she is currently wearing an ankle brace and using a walker. However, her arthritis is doing fine she reports that the colchicine she was recently prescribed has been very effective. She mentions that she is switching insurance companies and had an unexpected arm surgery, which has prevented her from getting an eye exam that she is aware of being overdue for. She was also laid off recently but was able to fill the majority of her medications and made the most of her previous insurance so she should be able to manage for a while. She has undergone multiple CT scans and is currently finished with chemotherapy. Lymphoma is in remission and her heart is doing fine. She is interested in the options she has for medications that manage her rheumatoid arthritis and her lymphoma. She has taken many medications including plaquenil, prednisone, arava, methotrexate, orencia, and rituximab, and has plenty of options to choose from if she wants to lower the cost of prescriptions or avoid unwanted side-effects. The plan at this time was to continue prednisone 5mg daily.     2-2019: Today, Amelia reports that she has had increased pain in her right shoulder, primarily losing rande of motion to extension. Notes that this has gotten worse after colchicine (for pericarditis) was stopped. She currently wears a brace on her feet to treat neuropathy, and uses a walker. She notes that these braces alter her gait, and as a result, has had some knee and low back pain, worse towards the end of the day.    Initial visit September 11, 2019  Frederic Gongora APRN:   Chronic low back pain into right knee, more this past month lumbar then right sided \"tennis ball\". Not evaluated over the time. Right foot neuropathy, with brace and some in left foot, left hand neuropathy chronic " "\"amiodorone infiltrated\". Chronic right hand arthralgia.     Gait impaired, walker for fear of falling since cardiac arrest 5/2017 lymphoma in heart led of afib long-term rehab, refractory afib before that. S/p cardioversion. Bone marrow bx 7/2017. Treatment July 2017 to 11/29/2017 (IV and intrathecal) think neuropathy came from, cardiomegaly from this and heart failure. EF 60-65% 9-2017, some effusion. Colchicine during this one a day and 'arthritis did great with that\". Never gone down on hydroxychloroquine (plaquenil)     Weather more arthralgia. Prednisone 5 mg once day, hydroxychloroquine (plaquenil) 200 mg BID , gabapentin 200 Mg TID very effective no side-effects, no vision issues. Caltrate BID, fosamax 70 mg every 7 day, right foot to 2\" strip to outside leg chronic stops at hip (one day had sattel anesthesia-image negative, did see neurology, who started gabapentin 7-2016), bilateral arches feet, left hand neuropathy is chronic left 1-3 fingers no feeling or the back side arm to elbow. No vision issues. No falls or injuries. No neck issues.           PMH:  Injury-left thumb amputation, left finger broke  Medical-  Antiplatelet or antithrombotic long-term use  Arrhythmia  Atrial tachycardia, flutter  Paroxysmal atrial fibrillation  Arthritis  Lymphoma  Cardiac arrest  history of chemotherapy  Primary pulmonary hypertension  Neuropathy  Postoperative nausea and vomiting  Rheumatoid arthritis  Hyperlipidemia LDL goal <130  Lymphedema of both lower extremities  Cardiogenic shock and cardiac arrest 5/2017  Acute respiratory failure with hypoxia  Critical illness myopathy  Sepsis  Wound of left upper extremity  Diffuse large B-cell lymphoma of extranodal site  Febrile neutropenia  Physical deconditioning  Palpitations  Osteoporosis  -DEXA 2018   Slowly healing wound in medial left antecubital fossa after traumatic IV  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, latter felt from lymphoma " "  Neuropathy of right foot with weakness after intrathecal cytarabine  perioditis   Right arm PICC blood clots during cancer treatments      Past Surgical History:  Anesthesia cardioversion  Right wrist arthrodesis, partial fusion \"history migrated out\"  Bone marrow aspirate & biopsy  Excise lesion upper extremity - left wound excision and closure, possible submuscular transposition of ulnar nerve  Foot surgery - 4 left, 2 right  Carpal tunnel bilateral release  Repair ventricular septal defect  Transpositional ulnar nerve (elbow) left   Right shoulder effusion history     Family History:   No autoimmune disorders, psoriasis, UC, crohn's, SLE, RA, PsA, gout, autoimmune thyroid.  No MS, heart disease in family  Mother - breast cancer, hypertension, alzheimer disease-passed   Father - hypertension, lymphoma, circulatory A fib-passed  Paternal grandmother - diabetes  Siblings-younger sister heatlhy PB, younger brother think arthritis not dx PB     Social History:   No smoking or tobacco use. No alcohol use. No IVDU. None Children. Right handed. . Disability      PMSH personally reviewed and updated by me.    ROS:  Negative raynaud s phenomena, hairloss, sun sensitivities, pleurisy, inflammatory joint symptoms, significant rashes like malar, oral/nasal or ulcerations, inflammatory eye disease, inflammatory bowel disease, dactylitis, tenosynovitis, or enthespathy. Negative for miscarriages over 2 months into pregnancy, gout, psoriasis, UC, crohn's. No temporal headache, no jaw claudication, no scalp tenderness, vision changes, carotidynia, cough. No STD. No Parotid swelling. Denies international travel. Denies history of pneumonia, hepatitis, tuberculosis, shingles, tick bites or other infections.     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 No acute distress, swollen " glands  EYES: No vision change, diplopia, pain in eyes or red eyes   EARS, NOSE, MOUTH, THROAT: No tinnitus or hearing change, no epistaxis or nasal discharge, no oral lesions, throat clear. Normal saliva pool.  No drymouth. No thyroid enlargement.   CARDIOVASCULAR: No chest pain, palpitations, or pain with walking, no orthopnea or PND   RESPIRATORY: No dyspnea, cough, shortness of breath or wheezing. No pleurisy.   GI: No nausea, vomiting, diarrhea or constipation, no abdominal pain, or blood in stools.   : No change in urine, no dysuria or hematuria   MUSCKL: No swollen, tender, red or painful joints. No nodules. No enthesitis, plantar fascitis or heel pain.   INTEGUMENTARY: No concerning lesions or moles   NEURO: No loss of strength or sensation, no numbness or tingling, no tremor, no dizziness, no headache. No falls   ENDO: No polyuria or polydipsia, no temperature intolerance   HEME/LYMPH:No concerning bumps, bleeding problems, or swollen lymph nodes. No recent infections, hospitalizations or new illnesses.   PSYCH:No depression or anxiety, no sleep problems.  Otherwise 14 point ROS obtained, reviewed and found negative.     Physical exam:  Vitals: Blood pressure 118/79, pulse 91, temperature 98.8  F (37.1  C), temperature source Oral, weight 62.4 kg (137 lb 9.6 oz), SpO2 97 %, not currently breastfeeding.  Wt Readings from Last 4 Encounters:   09/11/19 62.4 kg (137 lb 9.6 oz)   09/10/19 61.8 kg (136 lb 4.8 oz)   03/26/19 59 kg (130 lb)   02/28/19 62 kg (136 lb 11.2 oz)     Constitutional: WD-WN-WG cooperative. +cushionging  Eyes: nl EOM, PERRLA, vision, conjunctiva, sclera, No Unilateral or bilateral external ear inflammation   ENT: nl external ears, nose, hearing, lips, teeth, gums, throat. No saddle nose   Neck: no mass or thyroid enlargement  Resp: lungs clear to auscultation, nl to palpation, nl effort  CV: RRR, no murmurs, rubs or gallops, no edema  GI: no ABD mass or tenderness, no HSM  : not  tested  Lymph: no cervical or epitrochlear nodes  MS: Wrists with markedly limited ROM at baseline, no synovitis. Pannus right wrist, Several deformities present in right and left fingers, worse in right righ ulnar deviation. In particular, right 2nd and 3rd MCP joints with mild chronic pannus. Ulnar deviation bilaterally. Right z-thumb. -SLR All TMJ, neck, shoulder, elbow, wrist, hand, spine, hip, knee, ankle, and foot joints were examined and otherwise found normal. No active synovitis. Negative Lhermitte's sign. No periuncle erythema  Skin: No nail pitting, alopecia, rash, nodules or lesions; left elbow old scar and right hand.   Neuro: + chronic polyneuropathy with diminished sensation in BL LE.   Skin: no nail pitting, alopecia, rash  Neuro: nl cranial nerves, strength, sensation, DTRs.   Psych: nl judgement, orientation, memory, affect.      Labs/Imaging:  Eye Exam (10/23/18)  Significant for mild H-lated nuclear cataracts.   Ocular CT was recommended for right eye.     CT Chest/Abdomen/Pelvis (9/26/18)  In this patient with a history of lymphoma:  1. No evidence of disease recurrence, consistent with complete  response per Lugano criteria.  2. Stable cardiomegaly. Improved pericardial effusion.  3. Early contrast phase with heterogeneous visceral enhancement in the  abdomen, likely secondary to cardiac dysfunction.    MRI Cardiac w/contrast (4/12/18)  Loculated exudative pericardial effusion that is beginning to organize, with diffuse  pericardial enhancement consistent with ongoing inflammation. Normal LV fxn, LVEF 54% and RVEF 49%. At  least moderate, possibly severe tricuspid regurgitation. Compared to MRI from 03/2018, effusion is largely  unchanged in size (maybe a bit smaller) but is starting to organize. No change in pericardial enhancement.    Laboratory:     Results for orders placed or performed in visit on 09/10/19   CBC with platelets differential   Result Value Ref Range    WBC 7.3 4.0 - 11.0  10e9/L    RBC Count 4.09 3.8 - 5.2 10e12/L    Hemoglobin 14.8 11.7 - 15.7 g/dL    Hematocrit 43.1 35.0 - 47.0 %     (H) 78 - 100 fl    MCH 36.2 (H) 26.5 - 33.0 pg    MCHC 34.3 31.5 - 36.5 g/dL    RDW 12.6 10.0 - 15.0 %    Platelet Count 158 150 - 450 10e9/L    Diff Method Automated Method     % Neutrophils 84.3 %    % Lymphocytes 6.5 %    % Monocytes 8.1 %    % Eosinophils 0.4 %    % Basophils 0.4 %    % Immature Granulocytes 0.3 %    Nucleated RBCs 0 0 /100    Absolute Neutrophil 6.1 1.6 - 8.3 10e9/L    Absolute Lymphocytes 0.5 (L) 0.8 - 5.3 10e9/L    Absolute Monocytes 0.6 0.0 - 1.3 10e9/L    Absolute Eosinophils 0.0 0.0 - 0.7 10e9/L    Absolute Basophils 0.0 0.0 - 0.2 10e9/L    Abs Immature Granulocytes 0.0 0 - 0.4 10e9/L    Absolute Nucleated RBC 0.0    Comprehensive metabolic panel   Result Value Ref Range    Sodium 134 133 - 144 mmol/L    Potassium 3.8 3.4 - 5.3 mmol/L    Chloride 102 94 - 109 mmol/L    Carbon Dioxide 27 20 - 32 mmol/L    Anion Gap 5 3 - 14 mmol/L    Glucose 109 (H) 70 - 99 mg/dL    Urea Nitrogen 18 7 - 30 mg/dL    Creatinine 0.79 0.52 - 1.04 mg/dL    GFR Estimate 82 >60 mL/min/[1.73_m2]    GFR Estimate If Black >90 >60 mL/min/[1.73_m2]    Calcium 8.9 8.5 - 10.1 mg/dL    Bilirubin Total 0.7 0.2 - 1.3 mg/dL    Albumin 3.9 3.4 - 5.0 g/dL    Protein Total 7.6 6.8 - 8.8 g/dL    Alkaline Phosphatase 77 40 - 150 U/L    ALT 42 0 - 50 U/L    AST 34 0 - 45 U/L   Digoxin level   Result Value Ref Range    Digoxin Level 0.8 0.5 - 2.0 ug/L       Impression/Plan:  1. Seropositive rheumatoid arthritis (, RF 31) with multiple joint disability including MTP fusion 1-5 bilaterally, partial right wrist fusion, subluxation and ulnar deformity of MCP's. Rheumatoid arthritis (RA) remission. Will continue prednisone, but reduce hydroxychloroquine (plaquenil) 200 mg once a day. Prn joint injection or a short burst of prednisone for symptom relief (per her preference). She is not interested in using an  alternative (leflunomide or rituximab) for RA.  Advise cervical x-rays prior to any surgeries, does have DDD (no symptoms at this time). Poor prognosis, discussed risk extraarticular and deformities risks and of AA instability, she understands.  2. Long-term hydroxychloroquine (plaquenil) use since 6-2017, OCT  no toxicity, reviewed AAO guidelines, repeat every year. Will reduce to 200 mg once a day   3. Diffuse large B-cell lymphoma status-post 1 cycle R-EPOCH, 6 cycles R-CHOP; per oncology for surveillance   4. Osteoporosis, chronic long-term corticosteroid use with cushingoid appearance. Received 6-7 years of alendronate in early 2000s. Restarted alendronate in 1/2019. DEXA  T-2.6 thoracic. This is likely safe after an extended drug holiday, vitamin D levels have also been normal. Continue vit D-Ca supplementation, walker for fall risk prevention   5. 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 Low back pain, no radiculopathy, referral to Physical Therapy. If any redflags, 911/ER. If not improved or new symptoms, would advise follow-up with PCP and do imaging (risk of compression fx with osteoporosis )  8. Loculated pericardial effusion, etiology unclear (lymphoma). No symptoms now. Per oncology  9. Atrial fibrillation, long-term anticoagulation per cardiology PCP     RTC 6 month  90 day prescriptions per insurance     The patient understood the rationale for the diagnosis and treatment plan. Patient shared in the decision making. All questions were answered to best of my ability and the patient's satisfaction and agrees with the plan.     Frederic Gongora APRN, CNP, MSN  [facility]  Department of Rheumatology & Autoimmune Disorders    1. Immunization: Reviewed CDC guidelines with patient updated, information provided to patient based on CDC guidelines for vaccination recommendations, patient will discuss with primary  provider-advised review with PCP and if needs repeat pneumonia 23 vaccine (last 2011)  2. Bone Health:Educated on adequate calcium and vitamin D intake, Educated on exercise, Glucocorticoid education discussed risks, benefits & potential long term side effects from use  3. Discussed routine annual physicals, cancer screening, cardiac risk monitoring, bone health and primary care with primary care provider. Included is glucocorticosteroid increase change of infections.   4. Educated on diagnosis, prognosis, labs, imaging, treatment options (including risks, benefits, risk of no treatment), medications (use, dose, side-effects, risks of medications including infection/cancer/bone marrow suppression, lab monitoring), infections what to do, plan of cares, goals of treatment. Clinic cards given. Websites given for resources and education   5. Educated in Rheumatology we do not prescribe narcotics or manage chronic pain, the patient will need to discuss with primary care or go to a pain clinic for management.

## 2019-09-11 NOTE — NURSING NOTE
Patient requested 90 day supplies for medications from now on per her insurance. Told patient I would inquire about it. Spoke with Rheumatology RN Miguelina and she informed me that they would do refills for 90 days from now on. Will inform provider.     Arcelia Bess CMA [facility]    9/11/2019 12:40 PM

## 2019-09-11 NOTE — PATIENT INSTRUCTIONS
Preventive Care:    Colorectal Cancer Screening: During our visit today, we discussed that it is recommended you receive colorectal cancer screening. Please call or make an appointment with your primary care provider to discuss this. You may also call the [facility] scheduling line ([phone]) to set up a colonoscopy appointment.    Pneumonia 23 vaccine recommendated

## 2019-09-11 NOTE — NURSING NOTE
Chief Complaint   Patient presents with     RECHECK     RA       /79 (BP Location: Right arm, Patient Position: Sitting, Cuff Size: Adult Regular)   Pulse 91   Temp 98.8  F (37.1  C) (Oral)   Wt 62.4 kg (137 lb 9.6 oz)   SpO2 97%   BMI 28.76 kg/m      Arcelia Bess CMA [facility]    9/11/2019 10:55 AM

## 2019-09-28 ENCOUNTER — HEALTH MAINTENANCE LETTER (OUTPATIENT)
Age: 59
End: 2019-09-28

## 2019-10-02 ENCOUNTER — TELEPHONE (OUTPATIENT)
Dept: RHEUMATOLOGY | Facility: CLINIC | Age: 59
End: 2019-10-02

## 2019-10-02 DIAGNOSIS — M81.8 STEROID-INDUCED OSTEOPOROSIS: ICD-10-CM

## 2019-10-02 DIAGNOSIS — T38.0X5A STEROID-INDUCED OSTEOPOROSIS: ICD-10-CM

## 2019-10-02 DIAGNOSIS — M05.79 RHEUMATOID ARTHRITIS INVOLVING MULTIPLE SITES WITH POSITIVE RHEUMATOID FACTOR (H): ICD-10-CM

## 2019-10-02 RX ORDER — HYDROXYCHLOROQUINE SULFATE 200 MG/1
200 TABLET, FILM COATED ORAL 2 TIMES DAILY
Qty: 180 TABLET | Refills: 3 | OUTPATIENT
Start: 2019-10-02

## 2019-10-02 NOTE — TELEPHONE ENCOUNTER
hydroxychloroquine (PLAQUENIL) 200 MG tablet      Last Written Prescription Date:  9/11/19  Last Fill Quantity: 90,   # refills: 3  Last Office Visit : 9/11/19  Future Office visit: 3/11/20    Last eye exam: 10/29/18  10/2/19: Letter sent     Routing refill request to provider for review/approval because:  Duplicate medication. Frequency BID.

## 2019-10-02 NOTE — LETTER
Amelia Michel  [address]  [place] [facility] [address]  '    Dear Amelia Michel,     Regular eye exams are required while taking hydroxychloroquine (Plaquenil). Eye exams should be completed by an eye specialist who is experienced in monitoring for hydroxychloroquine toxicity (a rare effect of the drug that can damage your eyes and vision).  These may be yearly or as determined by your eye specialist.     Although vision problems and loss of sight while taking hydroxychloroquine are very rare, notify your doctor immediately if you notice changes in your vision. The goal of screening is to detect toxicity before your vision is significantly or noticeably impacted. Failing to get proper screening exams puts you at risk of vision changes which may or may not be reversible.    Per the American Academy of Ophthalmology recommendations (2016), screening tests performed may include a 10-2 visual field test, spectral-domain optical coherence tomography (SD OCT), or other screening tests as determined by the eye specialist, including a multifocal electroretinogram (mfERG) or fundus auto-fluorescence (FAF).    We received a refill request from your pharmacy. A copy of your current eye exam was not found in your medical record. Your hydroxychloroquine prescription has been refilled with a limited supply pending confirmation you have had an eye exam to test for hydroxychloroquine toxicity.      We encourage you to bring this letter to your eye exam to discuss the exams that will be performed during your visit. Please request your eye clinic fax or mail a copy of your eye exam report to our clinic indicating that testing was completed for hydroxychloroquine toxicity screening. The exam notes must specifically comment on the potential for hydroxychloroquine toxicity and outline recommended follow-up.    If you have questions about hydroxychloroquine or the information in this letter, please call the clinic at [phone]  or talk to your provider at your next office visit.                        2    Eye Specialist Letter  Dear Eye Specialist,  To ensure safe use of Plaquenil (hydroxychloroquine), we are requesting your assistance in providing the following information. Please return this form to our clinic via mail or fax, or incorporate this information into your visit summary. Your note must indicate whether or not there is evidence of toxicity from Plaquenil use. For questions regarding this form, please call [phone].  Patient Name:   :             Date of Exam:    The following exams were completed during this visit in accordance with the American Academy of Ophthalmology recommendations (2016):  ? 10-2 automated visual field  ? Spectral-domain optical coherence tomography (SD OCT)  ? Multifocal electroretinogram (mfERG)  ? Fundus autofluorescence (FAF)  ? Other (please specify)  Please select from the following:  ? The findings from the above exams are not suggestive of toxicity from Plaquenil (hydroxychloroquine).  ? The findings from the above exams may suggest toxicity from Plaquenil (hydroxychloroquine).  Directly contact the clinic and fax this form.  Please provide additional guidance on whether or not the medication may be continued from your perspective:  Date of next recommended Plaquenil (hydroxychloroquine) screening eye exam:   ? 5 years  ? 1 year  ? 6 months  Other (please specify):   Eye Specialist Name (print):                                                                Date:  Eye Specialist Signature:

## 2019-10-09 DIAGNOSIS — T38.0X5A STEROID-INDUCED OSTEOPOROSIS: ICD-10-CM

## 2019-10-09 DIAGNOSIS — M81.8 STEROID-INDUCED OSTEOPOROSIS: ICD-10-CM

## 2019-10-09 DIAGNOSIS — M05.79 RHEUMATOID ARTHRITIS INVOLVING MULTIPLE SITES WITH POSITIVE RHEUMATOID FACTOR (H): ICD-10-CM

## 2019-10-09 RX ORDER — HYDROXYCHLOROQUINE SULFATE 200 MG/1
200 TABLET, FILM COATED ORAL DAILY
Qty: 90 TABLET | Refills: 3 | OUTPATIENT
Start: 2019-10-09

## 2019-10-11 NOTE — PROGRESS NOTES
Infusion Nursing Note:  Amelia Michel presents today for Cycle 3 Day 1 Rituxan, Cytoxan, Adriamycin, Vincristine   (RCHOP).    Patient seen by provider today: No    Treatment Conditions:  Lab Results   Component Value Date    HGB 9.8 09/20/2017     Lab Results   Component Value Date    WBC 2.9 09/20/2017      Lab Results   Component Value Date    ANEU 2.1 09/20/2017     Lab Results   Component Value Date    PLT 77 09/20/2017      Lab Results   Component Value Date     09/20/2017                   Lab Results   Component Value Date    POTASSIUM 4.0 09/20/2017           Lab Results   Component Value Date    MAG 1.7 09/11/2017            Lab Results   Component Value Date    CR 0.60 09/20/2017                   Lab Results   Component Value Date    VIKTORIYA 9.3 09/20/2017                Lab Results   Component Value Date    BILITOTAL 0.5 09/20/2017           Lab Results   Component Value Date    ALBUMIN 3.5 09/20/2017                    Lab Results   Component Value Date    ALT 70 09/20/2017           Lab Results   Component Value Date    AST 44 09/20/2017         Intravenous Access:  Peripheral IV placed in lab.    Note: ECHO/MUGA completed 6/19/2017  EF 50-55%.    Platelet count 77K today.  Dr. Rodriguez notified    9/20/17 TORB Dr. Rodriguez / Peggy Booth RN:  OK to give Cycle 3 Rituxan, Cytoxan, Adriamycin, Vincristine   (RCHOP) today with platelet count 77K.  Pt should get labs rechecked in 1 week (9/27/17) with possible platelet transfusion (Transfuse for platelets <50K with pt on Lovenox)    Rituxan started at 100 ml/hr and titrated by 100 ml/hr every 30 minutes to a maximum rate of 400 ml/hr.    Neulasta Onpro On-Body injector applied to Left upper arm at 1420 with light facing down.  Writer discussed Neulasta injection would start on 9/21/2017 at 1720, approximately 27 hours after application applied today.  Written and Verbal instruction reviewed with patient.  Pt instructed when the dose delivery starts,  it will take about 45 minutes to complete.  Pt aware Neulasta Onpro On-Body should have green flashing light and to call triage or on-call MD if injector flashes red or appears to be leaking. Pt aware to keep Onpro On-Body Neualsta 4 inches away from electrical equipment and to avoid showering 4 hours prior to injection.   Neulasta Onpro Lot number: [id number]    Pt instructed to call triage or on-call MD with questions/concerns, temp >100.4.    Post Infusion Assessment:  Patient tolerated infusion without incident.  Blood return noted during administration of Adriamycin and Vincristine every 2 cc through peripheral IV.    Blood return noted pre and post infusions.  No evidence of extravasations.  Access discontinued per protocol.    Discharge Plan:   Prescription refills given for Prednisone.  Discharge instructions reviewed with: Patient.  Patient and/or family verbalized understanding of discharge instructions and all questions answered.  Copy of AVS reviewed with patient and/or family.  Patient will return 9/27/2017 for labs and possible platelet transfusion.  Next chemotherapy scheduled for 10/11/2017.  Patient discharged in stable condition accompanied by: .  Departure Mode: Ambulatory.  Face to Face time: 10 min.    Peggy Booth RN   no

## 2019-10-16 ENCOUNTER — TELEPHONE (OUTPATIENT)
Dept: FAMILY MEDICINE | Facility: CLINIC | Age: 59
End: 2019-10-16

## 2019-10-16 DIAGNOSIS — Z12.11 SPECIAL SCREENING FOR MALIGNANT NEOPLASMS, COLON: Primary | ICD-10-CM

## 2019-10-16 PROBLEM — S41.102A WOUND OF LEFT UPPER EXTREMITY: Status: RESOLVED | Noted: 2017-06-19 | Resolved: 2019-10-16

## 2019-10-16 NOTE — TELEPHONE ENCOUNTER
I received a message that patient scheduled colonoscopy and needs orders.  She is due for her annual physical, I would like to see how she is doing with her medical concerns and specialists, as well as discuss risks/benefits of colonoscopy vs FIT test.  Would recommend she come in (40 min appointment) in next couple weeks to discuss this and have her physical.  Gala Stringer PA-C

## 2019-10-17 NOTE — TELEPHONE ENCOUNTER
Call placed to patient.  Voicemail message left requesting call back clinic RN.  Trupti Elliott RN

## 2019-10-21 NOTE — TELEPHONE ENCOUNTER
Patient called back and scheduled an appt with Gala Stringer on 10/24 for physical and discuss this issue.

## 2019-10-24 ENCOUNTER — OFFICE VISIT (OUTPATIENT)
Dept: FAMILY MEDICINE | Facility: CLINIC | Age: 59
End: 2019-10-24
Payer: COMMERCIAL

## 2019-10-24 VITALS
BODY MASS INDEX: 28.46 KG/M2 | HEIGHT: 58 IN | HEART RATE: 93 BPM | WEIGHT: 135.6 LBS | RESPIRATION RATE: 12 BRPM | OXYGEN SATURATION: 97 % | SYSTOLIC BLOOD PRESSURE: 123 MMHG | TEMPERATURE: 98.5 F | DIASTOLIC BLOOD PRESSURE: 89 MMHG

## 2019-10-24 DIAGNOSIS — I48.92 ATRIAL FLUTTER, UNSPECIFIED TYPE (H): ICD-10-CM

## 2019-10-24 DIAGNOSIS — I48.0 PAROXYSMAL ATRIAL FIBRILLATION (H): ICD-10-CM

## 2019-10-24 DIAGNOSIS — C83.398 DIFFUSE LARGE B-CELL LYMPHOMA OF EXTRANODAL SITE: ICD-10-CM

## 2019-10-24 DIAGNOSIS — Z13.220 SCREENING FOR LIPOID DISORDERS: ICD-10-CM

## 2019-10-24 DIAGNOSIS — I27.0 PRIMARY PULMONARY HYPERTENSION (H): ICD-10-CM

## 2019-10-24 DIAGNOSIS — Z13.1 ENCOUNTER FOR SCREENING EXAMINATION FOR IMPAIRED GLUCOSE REGULATION AND DIABETES MELLITUS: ICD-10-CM

## 2019-10-24 DIAGNOSIS — M05.711 RHEUMATOID ARTHRITIS INVOLVING BOTH SHOULDERS WITH POSITIVE RHEUMATOID FACTOR (H): ICD-10-CM

## 2019-10-24 DIAGNOSIS — Z12.11 SPECIAL SCREENING FOR MALIGNANT NEOPLASMS, COLON: ICD-10-CM

## 2019-10-24 DIAGNOSIS — J96.01 ACUTE RESPIRATORY FAILURE WITH HYPOXIA (H): ICD-10-CM

## 2019-10-24 DIAGNOSIS — Z86.74 H/O CARDIAC ARREST: ICD-10-CM

## 2019-10-24 DIAGNOSIS — D84.9 IMMUNOCOMPROMISED STATE (H): ICD-10-CM

## 2019-10-24 DIAGNOSIS — D69.6 THROMBOCYTOPENIA, UNSPECIFIED (H): ICD-10-CM

## 2019-10-24 DIAGNOSIS — I10 ESSENTIAL HYPERTENSION: ICD-10-CM

## 2019-10-24 DIAGNOSIS — I89.0 LYMPHEDEMA OF BOTH LOWER EXTREMITIES: ICD-10-CM

## 2019-10-24 DIAGNOSIS — I47.19 ATRIAL TACHYCARDIA (H): ICD-10-CM

## 2019-10-24 DIAGNOSIS — C83.30 DIFFUSE LARGE B-CELL LYMPHOMA, UNSPECIFIED BODY REGION (H): ICD-10-CM

## 2019-10-24 DIAGNOSIS — M05.712 RHEUMATOID ARTHRITIS INVOLVING BOTH SHOULDERS WITH POSITIVE RHEUMATOID FACTOR (H): ICD-10-CM

## 2019-10-24 DIAGNOSIS — I74.9 EMBOLISM AND THROMBOSIS OF UNSPECIFIED ARTERY (H): ICD-10-CM

## 2019-10-24 DIAGNOSIS — E44.1 MILD PROTEIN-CALORIE MALNUTRITION (H): ICD-10-CM

## 2019-10-24 DIAGNOSIS — Z00.00 ROUTINE GENERAL MEDICAL EXAMINATION AT A HEALTH CARE FACILITY: Primary | ICD-10-CM

## 2019-10-24 DIAGNOSIS — R57.0 CARDIOGENIC SHOCK (H): ICD-10-CM

## 2019-10-24 LAB
CHOLEST SERPL-MCNC: 121 MG/DL
GLUCOSE SERPL-MCNC: 111 MG/DL (ref 70–99)
HBA1C MFR BLD: 5.6 % (ref 0–5.6)
HDLC SERPL-MCNC: 50 MG/DL
LDLC SERPL CALC-MCNC: 54 MG/DL
NONHDLC SERPL-MCNC: 71 MG/DL
TRIGL SERPL-MCNC: 84 MG/DL

## 2019-10-24 PROCEDURE — 99396 PREV VISIT EST AGE 40-64: CPT | Mod: 25 | Performed by: PHYSICIAN ASSISTANT

## 2019-10-24 PROCEDURE — 90471 IMMUNIZATION ADMIN: CPT | Performed by: PHYSICIAN ASSISTANT

## 2019-10-24 PROCEDURE — 36415 COLL VENOUS BLD VENIPUNCTURE: CPT | Performed by: PHYSICIAN ASSISTANT

## 2019-10-24 PROCEDURE — 80061 LIPID PANEL: CPT | Performed by: PHYSICIAN ASSISTANT

## 2019-10-24 PROCEDURE — 90682 RIV4 VACC RECOMBINANT DNA IM: CPT | Performed by: PHYSICIAN ASSISTANT

## 2019-10-24 PROCEDURE — 82947 ASSAY GLUCOSE BLOOD QUANT: CPT | Performed by: PHYSICIAN ASSISTANT

## 2019-10-24 PROCEDURE — 83036 HEMOGLOBIN GLYCOSYLATED A1C: CPT | Performed by: PHYSICIAN ASSISTANT

## 2019-10-24 ASSESSMENT — MIFFLIN-ST. JEOR: SCORE: 1084.83

## 2019-10-24 NOTE — PROGRESS NOTES
"   SUBJECTIVE:   CC: Amelia Michel is an 58 year old woman who presents for preventive health visit.     Healthy Habits:    Do you get at least three servings of calcium containing foods daily (dairy, green leafy vegetables, etc.)? yes    Amount of exercise or daily activities, outside of work: Has not been doing a lot other then normal household chores due to a sore that developed on right outside ankle over the summer    Problems taking medications regularly No    Medication side effects: No    Have you had an eye exam in the past two years? yes    Do you see a dentist twice per year? yes    Do you have sleep apnea, excessive snoring or daytime drowsiness?no  *  Has a sore that developed on right ankle beginning summer and has not fully healed  *  Patient is fasting today will do labs   *  Will do flu shot    She has custom orthotics AFO  Right ankle lateral malleolus pressure sore since this spring. 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 Seeing them in March  Cardiology: stable,     Since having cancer, she has had looser/\"mushier\" stools  She eats fruits/veggies. She used to take fiber pill but made the consistency more pasty - difficulty with hygiene     Today's PHQ-2 Score:   PHQ-2 ( 1999 [employer]) 9/11/2019 10/2/2018   Q1: Little interest or pleasure in doing things 1 0   Q2: Feeling down, depressed or hopeless 1 0   PHQ-2 Score 2 0   Q1: Little interest or pleasure in doing things - Not at all   Q2: Feeling down, depressed or hopeless - Not at all   PHQ-2 Score - 0       Abuse: Current or Past(Physical, Sexual or Emotional)- No  Do " you feel safe in your environment? Yes    Social History     Tobacco Use     Smoking status: Never Smoker     Smokeless tobacco: Never Used   Substance Use Topics     Alcohol use: Yes     Comment: none     If you drink alcohol do you typically have >3 drinks per day or >7 drinks per week? No                     Reviewed orders with patient.  Reviewed health maintenance and updated orders accordingly - Yes  Labs reviewed in EPIC  BP Readings from Last 3 Encounters:   10/24/19 123/89   09/11/19 118/79   09/10/19 125/74    Wt Readings from Last 3 Encounters:   10/24/19 61.5 kg (135 lb 9.6 oz)   09/11/19 62.4 kg (137 lb 9.6 oz)   09/10/19 61.8 kg (136 lb 4.8 oz)                  Patient Active Problem List   Diagnosis     HTN (hypertension)     Primary pulmonary hypertension (H)     Hyperlipidemia LDL goal <130     RA (rheumatoid arthritis) (H)     Lymphedema of both lower extremities     Atrial tachycardia (H)     Cardiogenic shock (H)     Acute respiratory failure with hypoxia (H)     Critical illness myopathy     Sepsis (H)     Diffuse large B-cell lymphoma of extranodal site (H)     Diffuse large B cell lymphoma (H)     Febrile neutropenia (H)     Physical deconditioning     DLBCL (diffuse large B cell lymphoma) (H)     H/O cardiac arrest     Paroxysmal atrial fibrillation (H)     Atrial flutter, unspecified type (H)     Cervical cancer screening     Mild protein-calorie malnutrition (H)     Embolism and thrombosis of unspecified artery (H)     Thrombocytopenia, unspecified (H)     Past Surgical History:   Procedure Laterality Date     ANESTHESIA CARDIOVERSION N/A 5/17/2017    Procedure: ANESTHESIA CARDIOVERSION;  ANESTHESIA CARDIOVERSION;  Surgeon: GENERIC ANESTHESIA PROVIDER;  [facility]     ANESTHESIA CARDIOVERSION  3/12/2018    Procedure: ANESTHESIA CARDIOVERSION;;  Surgeon: GENERIC ANESTHESIA PROVIDER;  [facility]     ANESTHESIA CARDIOVERSION N/A 3/23/2018    Procedure: ANESTHESIA CARDIOVERSION;   Anesthesia Cardioversion ;  Surgeon: GENERIC ANESTHESIA PROVIDER;  [facility]     ANESTHESIA CARDIOVERSION N/A 4/12/2018    Procedure: ANESTHESIA CARDIOVERSION;  Cardioversion;  Surgeon: GENERIC ANESTHESIA PROVIDER;  [facility]     ARTHRODESIS WRIST  2000    Right wrist     BONE MARROW ASPIRATE &BIOPSY  7/12/2017          EXCISE LESION UPPER EXTREMITY Left 5/22/2018    Procedure: EXCISE LESION UPPER EXTREMITY;  Left Arm Wound Excision And Closure, Possible Submuscular Transposition of Ulnar Nerve  (Choice Anesthesia);  Surgeon: Kevin Sheldon MD;  Location: [facility]     FOOT SURGERY      4 left and 2 right     RELEASE CARPAL TUNNEL BILATERAL       REPAIR VENTRICULAR SEPTAL DEFECT  1969     TRANSPOSITION ULNAR NERVE (ELBOW) Left 5/22/2018    Procedure: TRANSPOSITION ULNAR NERVE (ELBOW);;  Surgeon: Kevin Sheldon MD;  [facility]       Social History     Tobacco Use     Smoking status: Never Smoker     Smokeless tobacco: Never Used   Substance Use Topics     Alcohol use: Yes     Comment: none     Family History   Problem Relation Age of Onset     Breast Cancer Mother      Hypertension Mother      Alzheimer Disease Mother      Cerebrovascular Disease Mother      Hypertension Father      Cerebrovascular Disease Father      Atrial fibrillation Father      Lymphoma Father      Prostate Cancer Father      Diabetes Paternal Grandmother      Alzheimer Disease Maternal Grandmother         likely     Arthritis Maternal Grandfather      Pneumonia Maternal Grandfather            Mammogram Screening: Patient over age 50, mutual decision to screen reflected in health maintenance.    Pertinent mammograms are reviewed under the imaging tab.  History of abnormal Pap smear: NO - age 30-65 PAP every 5 years with negative HPV co-testing recommended  PAP / HPV Latest Ref Rng & Units 10/4/2018 1/26/2015 2/8/2011   PAP - NIL NIL NIL   HPV 16 DNA NEG:Negative Negative - -   HPV 18 DNA NEG:Negative Negative - -   OTHER HR HPV  NEG:Negative Negative - -     Reviewed and updated as needed this visit by clinical staff  Tobacco  Allergies  Meds  Med Hx  Surg Hx  Fam Hx  Soc Hx        Reviewed and updated as needed this visit by Provider        Past Medical History:   Diagnosis Date     Arthritis      Cardiac arrest (H)      History of chemotherapy      Neuropathy      PONV (postoperative nausea and vomiting)       Past Surgical History:   Procedure Laterality Date     ANESTHESIA CARDIOVERSION N/A 5/17/2017    Procedure: ANESTHESIA CARDIOVERSION;  ANESTHESIA CARDIOVERSION;  Surgeon: GENERIC ANESTHESIA PROVIDER;  [facility]     ANESTHESIA CARDIOVERSION  3/12/2018    Procedure: ANESTHESIA CARDIOVERSION;;  Surgeon: GENERIC ANESTHESIA PROVIDER;  [facility]     ANESTHESIA CARDIOVERSION N/A 3/23/2018    Procedure: ANESTHESIA CARDIOVERSION;  Anesthesia Cardioversion ;  Surgeon: GENERIC ANESTHESIA PROVIDER;  [facility]     ANESTHESIA CARDIOVERSION N/A 4/12/2018    Procedure: ANESTHESIA CARDIOVERSION;  Cardioversion;  Surgeon: GENERIC ANESTHESIA PROVIDER;  [facility]     ARTHRODESIS WRIST  2000    Right wrist     BONE MARROW ASPIRATE &BIOPSY  7/12/2017          EXCISE LESION UPPER EXTREMITY Left 5/22/2018    Procedure: EXCISE LESION UPPER EXTREMITY;  Left Arm Wound Excision And Closure, Possible Submuscular Transposition of Ulnar Nerve  (Choice Anesthesia);  Surgeon: Kevin Sheldon MD;  [facility]     FOOT SURGERY      4 left and 2 right     RELEASE CARPAL TUNNEL BILATERAL       REPAIR VENTRICULAR SEPTAL DEFECT  1969     TRANSPOSITION ULNAR NERVE (ELBOW) Left 5/22/2018    Procedure: TRANSPOSITION ULNAR NERVE (ELBOW);;  Surgeon: Kevin Sheldon MD;  [facility]       ROS:  CONSTITUTIONAL: NEGATIVE for fever, chills, change in weight  INTEGUMENTARY/SKIN: NEGATIVE for worrisome rashes, moles or lesions  EYES: NEGATIVE for vision changes or irritation  ENT: NEGATIVE for ear, mouth and throat problems  RESP: NEGATIVE for  "significant cough or SOB  BREAST: NEGATIVE for masses, tenderness or discharge  CV: NEGATIVE for chest pain, palpitations or peripheral edema  GI: NEGATIVE for nausea, abdominal pain, heartburn, or change in bowel habits  : NEGATIVE for unusual urinary or vaginal symptoms. No vaginal bleeding.  MUSCULOSKELETAL: NEGATIVE for significant arthralgias or myalgia  NEURO: NEGATIVE for weakness, dizziness or paresthesias  PSYCHIATRIC: NEGATIVE for changes in mood or affect     OBJECTIVE:   /89   Pulse 93   Temp 98.5  F (36.9  C) (Tympanic)   Resp 12   Ht 1.473 m (4' 10\")   Wt 61.5 kg (135 lb 9.6 oz)   SpO2 97%   BMI 28.34 kg/m    EXAM:  GENERAL: healthy, alert and no distress  EYES: Eyes grossly normal to inspection, PERRL and conjunctivae and sclerae normal  HENT: ear canals and TM's normal, nose and mouth without ulcers or lesions  NECK: no adenopathy, no asymmetry, masses, or scars and thyroid normal to palpation  RESP: lungs clear to auscultation - no rales, rhonchi or wheezes  CV: regular rate and rhythm, normal S1 S2, no S3 or S4, no murmur, click or rub, no peripheral edema and peripheral pulses strong  ABDOMEN: soft, nontender, no hepatosplenomegaly, no masses and bowel sounds normal  MS: no gross musculoskeletal defects noted, no edema  SKIN: no suspicious lesions or rashes  POSITIVE Right lateral malleolus <1cm callous with dark area in middle appears like old bruise with callous covering,. Nontender. No signs of infection or erythema    NEURO: Normal strength and tone, mentation intact and speech normal  BACK: no CVA tenderness, no paralumbar tenderness  PSYCH: mentation appears normal, affect normal/bright  LYMPH: no cervical, supraclavicular, axillary, or inguinal adenopathy  Patient Deferred breast and  exam      Diagnostic Test Results:  Labs reviewed in Epic    ASSESSMENT/PLAN:       ICD-10-CM    1. Routine general medical examination at a health care facility Z00.00 C RIV4 (FLUBLOK) " VACCINE RECOMBINANT DNA PRSRV ANTIBIO FREE, IM [[id number]]   2. Screening for lipoid disorders Z13.220 Lipid panel reflex to direct LDL Fasting   3. Special screening for malignant neoplasms, colon Z12.11 GASTROENTEROLOGY ADULT REF PROCEDURE ONLY Anticoagulant (e.g., coumadin) (Elequis)   4. Encounter for screening examination for impaired glucose regulation and diabetes mellitus Z13.1 Glucose     Hemoglobin A1c   5. Immunocompromised state (H) D89.9    6. Mild protein-calorie malnutrition (H) E44.1    7. Embolism and thrombosis of unspecified artery (H) I74.9 STATIN NOT PRESCRIBED (INTENTIONAL)   8. Primary pulmonary hypertension (H) I27.0    9. Acute respiratory failure with hypoxia (H) J96.01    10. Thrombocytopenia, unspecified (H) D69.6    11. Cardiogenic shock (H) R57.0    12. Atrial tachycardia (H) I47.1    13. Atrial flutter, unspecified type (H) I48.92    14. Diffuse large B-cell lymphoma, unspecified body region (H) C83.30    15. Diffuse large B-cell lymphoma of extranodal site (H) C83.39    16. Essential hypertension I10    17. H/O cardiac arrest Z86.74    18. Lymphedema of both lower extremities I89.0    19. Paroxysmal atrial fibrillation (H) I48.0    20. Rheumatoid arthritis involving both shoulders with positive rheumatoid factor (H) M05.711     M05.712      Patient follows with BMT  Patient follows with rheumatology  Patient follows with cardiology  These conditions and recent office notes were reviewed.    Follow up with your orthotic clinic for your right ankle sore/callous  Let me know if you would like wound care clinic - we can try to find one in [place]    Call for nurse visit at [facility] for the day of your mammogram - to have the pneumonia shot  We did the flu shot today    Ask cardiologist about if you should stop elequis before colonoscopy    She has not had colonoscopy, she would like this done with her history of cancer. We discussed risks/benefits. She does not want FIT test    COUNSELING:  "  Reviewed preventive health counseling, as reflected in patient instructions       Regular exercise       Healthy diet/nutrition    Estimated body mass index is 28.34 kg/m  as calculated from the following:    Height as of this encounter: 1.473 m (4' 10\").    Weight as of this encounter: 61.5 kg (135 lb 9.6 oz).    Weight management plan: Discussed healthy diet and exercise guidelines     reports that she has never smoked. She has never used smokeless tobacco.    Counseling Resources:  ATP IV Guidelines  Pooled Cohorts Equation Calculator  Breast Cancer Risk Calculator  FRAX Risk Assessment  ICSI Preventive Guidelines  Dietary Guidelines for Americans, 2010  USDA's MyPlate  ASA Prophylaxis  Lung CA Screening    Gala Stringer PA-C  [facility]  "

## 2019-10-24 NOTE — PATIENT INSTRUCTIONS
Follow up with your orthotic clinic for your right ankle sore/callous  Let me know if you would like wound care clinic - we can try to find one in [place]    Call for nurse visit at [facility] for the day of your mammogram - to have the pneumonia shot  We did the flu shot today    Ask cardiologist about if you should stop elequis before colonoscopy    Preventive Health Recommendations  Female Ages 50 - 64    Yearly exam: See your health care provider every year in order to  o Review health changes.   o Discuss preventive care.    o Review your medicines if your doctor has prescribed any.      Get a Pap test every three years (unless you have an abnormal result and your provider advises testing more often).    If you get Pap tests with HPV test, you only need to test every 5 years, unless you have an abnormal result.     You do not need a Pap test if your uterus was removed (hysterectomy) and you have not had cancer.    You should be tested each year for STDs (sexually transmitted diseases) if you're at risk.     Have a mammogram every 1 to 2 years.    Have a colonoscopy at age 50, or have a yearly FIT test (stool test). These exams screen for colon cancer.      Have a cholesterol test every 5 years, or more often if advised.    Have a diabetes test (fasting glucose) every three years. If you are at risk for diabetes, you should have this test more often.     If you are at risk for osteoporosis (brittle bone disease), think about having a bone density scan (DEXA).    Shots: Get a flu shot each year. Get a tetanus shot every 10 years.    Nutrition:     Eat at least 5 servings of fruits and vegetables each day.    Eat whole-grain bread, whole-wheat pasta and brown rice instead of white grains and rice.    Get adequate Calcium and Vitamin D.     Lifestyle    Exercise at least 150 minutes a week (30 minutes a day, 5 days a week). This will help you control your weight and prevent disease.    Limit alcohol to one drink per  day.    No smoking.     Wear sunscreen to prevent skin cancer.     See your dentist every six months for an exam and cleaning.    See your eye doctor every 1 to 2 years.

## 2019-10-24 NOTE — LETTER
November 13, 2019      Amelia Michel  [address] [place] [address]        Dear Amelia,    We are writing to inform you of your test results.    Here are your cholesterol results: Your LDL (bad cholesterol) is 54 (normal is less than 130).  Your HDL (good cholesterol) is 50 (normal for women is 50 or greater, normal for men is 40 or greater).  Your triglycerides (fats in the blood) are 84 (normal is less than 150). Glucose is slightly high but HbA1C (3 month average of blood sugars) is still in the normal range.     Resulted Orders   Lipid panel reflex to direct LDL Fasting   Result Value Ref Range    Cholesterol 121 <200 mg/dL    Triglycerides 84 <150 mg/dL      Comment:      Fasting specimen    HDL Cholesterol 50 >49 mg/dL    LDL Cholesterol Calculated 54 <100 mg/dL      Comment:      Desirable:       <100 mg/dl    Non HDL Cholesterol 71 <130 mg/dL   Glucose   Result Value Ref Range    Glucose 111 (H) 70 - 99 mg/dL   Hemoglobin A1c   Result Value Ref Range    Hemoglobin A1C 5.6 0 - 5.6 %      Comment:      Normal <5.7% Prediabetes 5.7-6.4%  Diabetes 6.5% or higher - adopted from ADA   consensus guidelines.       If you have any questions or concerns, please call the clinic at the number listed above.     Sincerely,    Gala Stringer PA-C/sc

## 2019-10-27 DIAGNOSIS — I50.33 ACUTE ON CHRONIC DIASTOLIC HEART FAILURE (H): ICD-10-CM

## 2019-10-28 RX ORDER — SPIRONOLACTONE 25 MG/1
TABLET ORAL
Qty: 90 TABLET | Refills: 3 | Status: SHIPPED | OUTPATIENT
Start: 2019-10-28 | End: 2019-12-12

## 2019-10-28 NOTE — TELEPHONE ENCOUNTER
"SPIRONOLACTONE 25MG TABLETS      Last Written Prescription Date:  7/31/19  Last Fill Quantity: 90,   # refills: 0  Last Office Visit: 10/24/19  Future Office visit:       Requested Prescriptions   Pending Prescriptions Disp Refills     spironolactone (ALDACTONE) 25 MG tablet [Pharmacy Med Name: SPIRONOLACTONE 25MG TABLETS] 90 tablet 0     Sig: TAKE 1 TABLET BY MOUTH DAILY       Diuretics (Including Combos) Protocol Passed - 10/27/2019  3:27 AM        Passed - Blood pressure under 140/90 in past 12 months     BP Readings from Last 3 Encounters:   10/24/19 123/89   09/11/19 118/79   09/10/19 125/74                 Passed - Recent (12 mo) or future (30 days) visit within the authorizing provider's specialty     Patient has had an office visit with the authorizing provider or a provider within the authorizing providers department within the previous 12 mos or has a future within next 30 days. See \"Patient Info\" tab in inbasket, or \"Choose Columns\" in Meds & Orders section of the refill encounter.              Passed - Medication is active on med list        Passed - Patient is age 18 or older        Passed - No active pregancy on record        Passed - Normal serum creatinine on file in past 12 months     Recent Labs   Lab Test 09/10/19  1215   CR 0.79              Passed - Normal serum potassium on file in past 12 months     Recent Labs   Lab Test 09/10/19  1215   POTASSIUM 3.8                    Passed - Normal serum sodium on file in past 12 months     Recent Labs   Lab Test 09/10/19  1215                 Passed - No positive pregnancy test in past 12 months          "

## 2019-10-28 NOTE — TELEPHONE ENCOUNTER
Routing refill request to provider for review/approval because:  10/24/19 office visit notes have not been finalized. Thank you.  Frederic Power RN

## 2019-10-29 PROBLEM — I74.9 EMBOLISM AND THROMBOSIS OF UNSPECIFIED ARTERY (H): Status: ACTIVE | Noted: 2019-10-29

## 2019-10-29 PROBLEM — R00.2 PALPITATIONS: Status: RESOLVED | Noted: 2018-03-07 | Resolved: 2019-10-29

## 2019-10-29 PROBLEM — D69.6 THROMBOCYTOPENIA, UNSPECIFIED (H): Status: ACTIVE | Noted: 2019-10-29

## 2019-10-29 PROBLEM — E44.1 MILD PROTEIN-CALORIE MALNUTRITION (H): Status: ACTIVE | Noted: 2019-10-29

## 2019-10-31 ENCOUNTER — TRANSFERRED RECORDS (OUTPATIENT)
Dept: HEALTH INFORMATION MANAGEMENT | Facility: CLINIC | Age: 59
End: 2019-10-31

## 2019-11-04 ENCOUNTER — DOCUMENTATION ONLY (OUTPATIENT)
Dept: RHEUMATOLOGY | Facility: CLINIC | Age: 59
End: 2019-11-04

## 2019-11-04 VITALS — WEIGHT: 135.58 LBS | BODY MASS INDEX: 28.34 KG/M2

## 2019-11-13 ENCOUNTER — ALLIED HEALTH/NURSE VISIT (OUTPATIENT)
Dept: FAMILY MEDICINE | Facility: CLINIC | Age: 59
End: 2019-11-13
Payer: COMMERCIAL

## 2019-11-13 ENCOUNTER — HOSPITAL ENCOUNTER (OUTPATIENT)
Dept: MAMMOGRAPHY | Facility: CLINIC | Age: 59
Discharge: HOME | End: 2019-11-13
Attending: PHYSICIAN ASSISTANT | Admitting: PHYSICIAN ASSISTANT
Payer: COMMERCIAL

## 2019-11-13 ENCOUNTER — RECORDS - HEALTHEAST (OUTPATIENT)
Dept: ADMINISTRATIVE | Facility: OTHER | Age: 59
End: 2019-11-13

## 2019-11-13 DIAGNOSIS — Z23 NEED FOR VACCINATION: Primary | ICD-10-CM

## 2019-11-13 DIAGNOSIS — Z12.31 VISIT FOR SCREENING MAMMOGRAM: ICD-10-CM

## 2019-11-13 PROCEDURE — 90471 IMMUNIZATION ADMIN: CPT

## 2019-11-13 PROCEDURE — 99207 ZZC NO CHARGE NURSE ONLY: CPT

## 2019-11-13 PROCEDURE — 77067 SCR MAMMO BI INCL CAD: CPT

## 2019-11-13 PROCEDURE — 90670 PCV13 VACCINE IM: CPT

## 2019-11-13 NOTE — NURSING NOTE
"Chief Complaint   Patient presents with     Imm/Inj     Prevnar 13       Initial There were no vitals taken for this visit. Estimated body mass index is 28.34 kg/m  as calculated from the following:    Height as of 10/24/19: 1.473 m (4' 10\").    Weight as of 11/4/19: 61.5 kg (135 lb 9.3 oz).    Medication Reconciliation:  unable or not appropriate to perform    Beth Price CMA ([facility])    Prior to immunization administration, verified patients identity using patient s name and date of birth. Please see Immunization Activity for additional information.     Screening Questionnaire for Adult Immunization    Are you sick today?   No   Do you have allergies to medications, food, a vaccine component or latex?   No   Have you ever had a serious reaction after receiving a vaccination?   No   Do you have a long-term health problem with heart disease, lung disease, asthma, kidney disease, metabolic disease (e.g. diabetes), anemia, or other blood disorder?   No   Do you have cancer, leukemia, HIV/AIDS, or any other immune system problem?   No   In the past 3 months, have you taken medications that affect  your immune system, such as prednisone, other steroids, or anticancer drugs; drugs for the treatment of rheumatoid arthritis, Crohn s disease, or psoriasis; or have you had radiation treatments?   No   Have you had a seizure, or a brain or other nervous system problem?   No   During the past year, have you received a transfusion of blood or blood     products, or been given immune (gamma) globulin or antiviral drug?   No   For women: Are you pregnant or is there a chance you could become        pregnant during the next month?   No   Have you received any vaccinations in the past 4 weeks?   No     Immunization questionnaire answers were all negative.        Per orders of Dr. Small, injection of PREVNAR 13 given by Beth Price CMA. Patient instructed to remain in clinic for 15 minutes afterwards, and to report any adverse " reaction to me immediately.       Screening performed by Beth Price CMA on 11/13/2019 at 1:13 PM.

## 2019-11-14 ENCOUNTER — MYC MEDICAL ADVICE (OUTPATIENT)
Dept: RHEUMATOLOGY | Facility: CLINIC | Age: 59
End: 2019-11-14

## 2019-11-14 ENCOUNTER — MYC MEDICAL ADVICE (OUTPATIENT)
Dept: CARDIOLOGY | Facility: CLINIC | Age: 59
End: 2019-11-14

## 2019-11-14 DIAGNOSIS — M05.79 RHEUMATOID ARTHRITIS INVOLVING MULTIPLE SITES WITH POSITIVE RHEUMATOID FACTOR (H): ICD-10-CM

## 2019-11-14 DIAGNOSIS — T38.0X5A STEROID-INDUCED OSTEOPOROSIS: ICD-10-CM

## 2019-11-14 DIAGNOSIS — M81.8 STEROID-INDUCED OSTEOPOROSIS: ICD-10-CM

## 2019-11-14 RX ORDER — HYDROXYCHLOROQUINE SULFATE 200 MG/1
200 TABLET, FILM COATED ORAL DAILY
Qty: 90 TABLET | Refills: 3 | Status: SHIPPED | OUTPATIENT
Start: 2019-11-14 | End: 2020-10-29

## 2019-11-14 NOTE — TELEPHONE ENCOUNTER
Plaquenil   Last Written Prescription Date:  9/11/19  Last Fill Quantity: 90 tablet,   # refills: 3  Last Office Visit: 9/11/19  Future Office visit:  3/11/20    CBC RESULTS:   Recent Labs   Lab Test 09/10/19  1215   WBC 7.3   RBC 4.09   HGB 14.8   HCT 43.1   *   MCH 36.2*   MCHC 34.3   RDW 12.6          Creatinine   Date Value Ref Range Status   09/10/2019 0.79 0.52 - 1.04 mg/dL Final   ]    Liver Function Studies -   Recent Labs   Lab Test 09/10/19  1215   PROTTOTAL 7.6   ALBUMIN 3.9   BILITOTAL 0.7   ALKPHOS 77   AST 34   ALT 42       Routing refill request to provider for review/approval because:  Provider signature required     ASHLEIGH WrenN RN   Rheumatology Clinic Nurse   [facility]

## 2019-11-18 ENCOUNTER — ANESTHESIA EVENT (OUTPATIENT)
Dept: GASTROENTEROLOGY | Facility: CLINIC | Age: 59
End: 2019-11-18
Payer: COMMERCIAL

## 2019-11-18 ASSESSMENT — ENCOUNTER SYMPTOMS: DYSRHYTHMIAS: 1

## 2019-11-18 NOTE — ANESTHESIA PREPROCEDURE EVALUATION
Anesthesia Pre-Procedure Evaluation    Patient: Amelia Michel   MRN: [id number] : 1960          Preoperative Diagnosis: Colon cancer screening [Z12.11]    Procedure(s):  COLONOSCOPY    Past Medical History:   Diagnosis Date     Arthritis      Cardiac arrest (H)      History of chemotherapy      Hypertension      Neuropathy      Past Surgical History:   Procedure Laterality Date     ANESTHESIA CARDIOVERSION N/A 2017    Procedure: ANESTHESIA CARDIOVERSION;  ANESTHESIA CARDIOVERSION;  Surgeon: GENERIC ANESTHESIA PROVIDER;  [facility]     ANESTHESIA CARDIOVERSION  3/12/2018    Procedure: ANESTHESIA CARDIOVERSION;;  Surgeon: GENERIC ANESTHESIA PROVIDER;  [facility]     ANESTHESIA CARDIOVERSION N/A 3/23/2018    Procedure: ANESTHESIA CARDIOVERSION;  Anesthesia Cardioversion ;  Surgeon: GENERIC ANESTHESIA PROVIDER;  [facility]     ANESTHESIA CARDIOVERSION N/A 2018    Procedure: ANESTHESIA CARDIOVERSION;  Cardioversion;  Surgeon: GENERIC ANESTHESIA PROVIDER;  [facility]     ARTHRODESIS WRIST      Right wrist     BONE MARROW ASPIRATE &BIOPSY  2017          EXCISE LESION UPPER EXTREMITY Left 2018    Procedure: EXCISE LESION UPPER EXTREMITY;  Left Arm Wound Excision And Closure, Possible Submuscular Transposition of Ulnar Nerve  (Choice Anesthesia);  Surgeon: Kevin Sheldon MD;  [facility]     FOOT SURGERY      4 left and 2 right     RELEASE CARPAL TUNNEL BILATERAL       REPAIR VENTRICULAR SEPTAL DEFECT  1969     TRANSPOSITION ULNAR NERVE (ELBOW) Left 2018    Procedure: TRANSPOSITION ULNAR NERVE (ELBOW);;  Surgeon: Kevin Sheldon MD;  Location[facility]       Anesthesia Evaluation     . Pt has had prior anesthetic. Type: General and MAC    No history of anesthetic complications          ROS/MED HX    ENT/Pulmonary:  - neg pulmonary ROS     Neurologic:     (+)neuropathy     Cardiovascular: Comment: Hx of cardiac arrest  Embolism and thrombosis of unspecified  artery  Lymphedema BLEs    (+) Dyslipidemia, hypertension----. : . . . :. dysrhythmias a-flutter and a-fib, . congenital heart disease VSD repair:, pulmonary hypertension, Previous cardiac testing date:results:date: results:ECG reviewed date:2/22/19 results:Sinus rhythm  Nonspecific ST and T wave abnormality  Abnormal ECG  When compared with ECG of 15-AUG-2018 09:02,  Vent. rate has increased BY 41 BPM  T wave inversion no longer evident in Anterior leads date: results:          METS/Exercise Tolerance:  >4 METS   Hematologic:     (+) Other Hematologic Disorder-Thrombocytopenia      Musculoskeletal:   (+) arthritis,  -       GI/Hepatic:  - neg GI/hepatic ROS       Renal/Genitourinary:  - ROS Renal section negative       Endo:  - neg endo ROS       Psychiatric:  - neg psychiatric ROS       Infectious Disease:   (+) VRE, Other Infectious Disease ESBL      Malignancy:   (+) Malignancy History of Lymphoma/Leukemia          Other: Comment: Hx sepsis                         Physical Exam  Normal systems: cardiovascular, pulmonary and dental    Airway   Mallampati: II  TM distance: >3 FB  Neck ROM: full    Dental     Cardiovascular   Rhythm and rate: regular and normal      Pulmonary    breath sounds clear to auscultation            Lab Results   Component Value Date    WBC 7.3 09/10/2019    HGB 14.8 09/10/2019    HCT 43.1 09/10/2019     09/10/2019    CRP 63.0 (H) 03/08/2018    SED 6 03/08/2018     09/10/2019    POTASSIUM 3.8 09/10/2019    CHLORIDE 102 09/10/2019    CO2 27 09/10/2019    BUN 18 09/10/2019    CR 0.79 09/10/2019     (H) 10/24/2019    VIKTORIYA 8.9 09/10/2019    PHOS 3.4 09/13/2017    MAG 1.9 04/12/2018    ALBUMIN 3.9 09/10/2019    PROTTOTAL 7.6 09/10/2019    ALT 42 09/10/2019    AST 34 09/10/2019    ALKPHOS 77 09/10/2019    BILITOTAL 0.7 09/10/2019    LIPASE 88 05/30/2017    PTT 30 08/07/2017    INR 3.42 (H) 03/07/2018    FIBR 248 08/07/2017    TSH 1.03 05/15/2017       Preop Vitals  BP Readings  "from Last 3 Encounters:   10/24/19 123/89   09/11/19 118/79   09/10/19 125/74    Pulse Readings from Last 3 Encounters:   10/24/19 93   09/11/19 91   09/10/19 89      Resp Readings from Last 3 Encounters:   10/24/19 12   09/10/19 16   03/26/19 18    SpO2 Readings from Last 3 Encounters:   10/24/19 97%   09/11/19 97%   09/10/19 97%      Temp Readings from Last 1 Encounters:   10/24/19 36.9  C (98.5  F) (Tympanic)    Ht Readings from Last 1 Encounters:   10/24/19 1.473 m (4' 10\")      Wt Readings from Last 1 Encounters:   11/04/19 61.5 kg (135 lb 9.3 oz)    Estimated body mass index is 28.34 kg/m  as calculated from the following:    Height as of 10/24/19: 1.473 m (4' 10\").    Weight as of 11/4/19: 61.5 kg (135 lb 9.3 oz).       Anesthesia Plan      History & Physical Review  History and physical reviewed and following examination; no interval change.    ASA Status:  3 .    NPO Status:  > 6 hours    Plan for General with Propofol induction. Maintenance will be TIVA.           Postoperative Care      Consents  Anesthetic plan, risks, benefits and alternatives discussed with:  Patient..                 ELIZABETH Fermin CRNA  "

## 2019-11-19 ENCOUNTER — ANESTHESIA (OUTPATIENT)
Dept: GASTROENTEROLOGY | Facility: CLINIC | Age: 59
End: 2019-11-19
Payer: COMMERCIAL

## 2019-11-19 ENCOUNTER — HOSPITAL ENCOUNTER (OUTPATIENT)
Facility: CLINIC | Age: 59
Discharge: HOME | End: 2019-11-19
Attending: SURGERY | Admitting: SURGERY
Payer: COMMERCIAL

## 2019-11-19 VITALS
WEIGHT: 133 LBS | TEMPERATURE: 98.3 F | HEART RATE: 68 BPM | RESPIRATION RATE: 16 BRPM | OXYGEN SATURATION: 99 % | HEIGHT: 58 IN | BODY MASS INDEX: 27.92 KG/M2 | DIASTOLIC BLOOD PRESSURE: 90 MMHG | SYSTOLIC BLOOD PRESSURE: 111 MMHG

## 2019-11-19 LAB — COLONOSCOPY: NORMAL

## 2019-11-19 PROCEDURE — 25800030 ZZH RX IP 258 OP 636: Performed by: SURGERY

## 2019-11-19 PROCEDURE — 25000125 ZZHC RX 250: Performed by: SURGERY

## 2019-11-19 PROCEDURE — 25000125 ZZHC RX 250: Performed by: NURSE ANESTHETIST, CERTIFIED REGISTERED

## 2019-11-19 PROCEDURE — 25000128 H RX IP 250 OP 636: Performed by: NURSE ANESTHETIST, CERTIFIED REGISTERED

## 2019-11-19 PROCEDURE — 25000128 H RX IP 250 OP 636: Performed by: SURGERY

## 2019-11-19 PROCEDURE — 88305 TISSUE EXAM BY PATHOLOGIST: CPT | Mod: 26 | Performed by: SURGERY

## 2019-11-19 PROCEDURE — 37000008 ZZH ANESTHESIA TECHNICAL FEE, 1ST 30 MIN: Performed by: SURGERY

## 2019-11-19 PROCEDURE — 45380 COLONOSCOPY AND BIOPSY: CPT | Mod: PT | Performed by: SURGERY

## 2019-11-19 PROCEDURE — 37000009 ZZH ANESTHESIA TECHNICAL FEE, EACH ADDTL 15 MIN: Performed by: SURGERY

## 2019-11-19 PROCEDURE — 88305 TISSUE EXAM BY PATHOLOGIST: CPT | Performed by: SURGERY

## 2019-11-19 RX ORDER — SODIUM CHLORIDE, SODIUM LACTATE, POTASSIUM CHLORIDE, CALCIUM CHLORIDE 600; 310; 30; 20 MG/100ML; MG/100ML; MG/100ML; MG/100ML
INJECTION, SOLUTION INTRAVENOUS CONTINUOUS
Status: DISCONTINUED | OUTPATIENT
Start: 2019-11-19 | End: 2019-11-19 | Stop reason: HOSPADM

## 2019-11-19 RX ORDER — PROPOFOL 10 MG/ML
INJECTION, EMULSION INTRAVENOUS PRN
Status: DISCONTINUED | OUTPATIENT
Start: 2019-11-19 | End: 2019-11-19

## 2019-11-19 RX ORDER — LIDOCAINE 40 MG/G
CREAM TOPICAL
Status: DISCONTINUED | OUTPATIENT
Start: 2019-11-19 | End: 2019-11-19 | Stop reason: HOSPADM

## 2019-11-19 RX ORDER — ONDANSETRON 2 MG/ML
4 INJECTION INTRAMUSCULAR; INTRAVENOUS
Status: COMPLETED | OUTPATIENT
Start: 2019-11-19 | End: 2019-11-19

## 2019-11-19 RX ORDER — LIDOCAINE HYDROCHLORIDE 10 MG/ML
INJECTION, SOLUTION INFILTRATION; PERINEURAL PRN
Status: DISCONTINUED | OUTPATIENT
Start: 2019-11-19 | End: 2019-11-19

## 2019-11-19 RX ADMIN — PROPOFOL 50 MG: 10 INJECTION, EMULSION INTRAVENOUS at 12:40

## 2019-11-19 RX ADMIN — ONDANSETRON 4 MG: 2 INJECTION INTRAMUSCULAR; INTRAVENOUS at 12:11

## 2019-11-19 RX ADMIN — SODIUM CHLORIDE, POTASSIUM CHLORIDE, SODIUM LACTATE AND CALCIUM CHLORIDE: 600; 310; 30; 20 INJECTION, SOLUTION INTRAVENOUS at 12:11

## 2019-11-19 RX ADMIN — PROPOFOL 100 MG: 10 INJECTION, EMULSION INTRAVENOUS at 12:30

## 2019-11-19 RX ADMIN — LIDOCAINE HYDROCHLORIDE 0.1 ML: 10 INJECTION, SOLUTION EPIDURAL; INFILTRATION; INTRACAUDAL; PERINEURAL at 12:12

## 2019-11-19 RX ADMIN — PROPOFOL 100 MG: 10 INJECTION, EMULSION INTRAVENOUS at 12:33

## 2019-11-19 RX ADMIN — LIDOCAINE HYDROCHLORIDE 50 MG: 10 INJECTION, SOLUTION INFILTRATION; PERINEURAL at 12:27

## 2019-11-19 RX ADMIN — PROPOFOL 100 MG: 10 INJECTION, EMULSION INTRAVENOUS at 12:27

## 2019-11-19 ASSESSMENT — MIFFLIN-ST. JEOR: SCORE: 1073.03

## 2019-11-19 NOTE — ANESTHESIA POSTPROCEDURE EVALUATION
Patient: Amelia Michel    Procedure(s):  Colonoscopy, With Polypectomy And Biopsy    Diagnosis:Colon cancer screening [Z12.11]  Diagnosis Additional Information: No value filed.    Anesthesia Type:  General    Note:  Anesthesia Post Evaluation    Patient location during evaluation: Bedside  Patient participation: Able to fully participate in evaluation  Level of consciousness: awake and alert  Pain management: adequate  multimodal analgesia used between 6 hours prior to anesthesia start to PACU dischargeAirway patency: patent  Cardiovascular status: acceptable  Respiratory status: acceptable  two or more mitigation strategies used for obstructive sleep apneaHydration status: acceptable  PONV: none     Anesthetic complications: None          Last vitals:  Vitals:    11/19/19 1151 11/19/19 1300   BP: (!) 126/92 93/67   Pulse: 94    Resp: 18 18   Temp: 36.8  C (98.3  F)    SpO2: 95% 97%         Electronically Signed By: ELIZABETH Conrad CRNA  November 19, 2019  1:03 PM

## 2019-11-19 NOTE — H&P
58 year old year old female here for colonoscopy for screening.    Patient Active Problem List   Diagnosis     HTN (hypertension)     Primary pulmonary hypertension (H)     Hyperlipidemia LDL goal <130     RA (rheumatoid arthritis) (H)     Lymphedema of both lower extremities     Atrial tachycardia (H)     Cardiogenic shock (H)     Acute respiratory failure with hypoxia (H)     Critical illness myopathy     Sepsis (H)     Diffuse large B-cell lymphoma of extranodal site (H)     Diffuse large B cell lymphoma (H)     Febrile neutropenia (H)     Physical deconditioning     DLBCL (diffuse large B cell lymphoma) (H)     H/O cardiac arrest     Paroxysmal atrial fibrillation (H)     Atrial flutter, unspecified type (H)     Cervical cancer screening     Mild protein-calorie malnutrition (H)     Embolism and thrombosis of unspecified artery (H)     Thrombocytopenia, unspecified (H)       Past Medical History:   Diagnosis Date     Arthritis      Cardiac arrest (H)      History of chemotherapy      Hypertension      Neuropathy        Past Surgical History:   Procedure Laterality Date     ANESTHESIA CARDIOVERSION N/A 5/17/2017    Procedure: ANESTHESIA CARDIOVERSION;  ANESTHESIA CARDIOVERSION;  Surgeon: GENERIC ANESTHESIA PROVIDER;  [facility]     ANESTHESIA CARDIOVERSION  3/12/2018    Procedure: ANESTHESIA CARDIOVERSION;;  Surgeon: GENERIC ANESTHESIA PROVIDER;  [facility]     ANESTHESIA CARDIOVERSION N/A 3/23/2018    Procedure: ANESTHESIA CARDIOVERSION;  Anesthesia Cardioversion ;  Surgeon: GENERIC ANESTHESIA PROVIDER;  [facility]     ANESTHESIA CARDIOVERSION N/A 4/12/2018    Procedure: ANESTHESIA CARDIOVERSION;  Cardioversion;  Surgeon: GENERIC ANESTHESIA PROVIDER;  [facility]     ARTHRODESIS WRIST  2000    Right wrist     BONE MARROW ASPIRATE &BIOPSY  7/12/2017          EXCISE LESION UPPER EXTREMITY Left 5/22/2018    Procedure: EXCISE LESION UPPER EXTREMITY;  Left Arm Wound Excision And Closure, Possible  "Submuscular Transposition of Ulnar Nerve  (Choice Anesthesia);  Surgeon: Kevin Sheldon MD;  [facility]     FOOT SURGERY      4 left and 2 right     RELEASE CARPAL TUNNEL BILATERAL       REPAIR VENTRICULAR SEPTAL DEFECT  1969     TRANSPOSITION ULNAR NERVE (ELBOW) Left 5/22/2018    Procedure: TRANSPOSITION ULNAR NERVE (ELBOW);;  Surgeon: Kevin Sheldon MD;  [facility]       @Health system@    No current outpatient medications on file.       Allergies   Allergen Reactions     Blood Transfusion Related (Informational Only) Hives     Hives with platelets. Give benadryl premedication.     Metoprolol Other (See Comments)     Pt and  report that metoprolol does not work for her and also reports feeling unwell with this medication. She has been able to tolerate atenolol, which as worked in controlling her HR.      No Clinical Screening - See Comments      Penicillin Allergy Skin Test not performed, see antimicrobial management team progress note 7/5/17.       Penicillins      Tape [Adhesive Tape] Rash       Pt reports that she has never smoked. She has never used smokeless tobacco. She reports current alcohol use. She reports that she does not use drugs.    Exam:  BP (!) 126/92 (BP Location: Right arm)   Pulse 94   Temp 98.3  F (36.8  C) (Oral)   Resp 18   Ht 1.473 m (4' 10\")   Wt 60.3 kg (133 lb)   SpO2 95%   BMI 27.80 kg/m      Awake, Alert OX3  Lungs - CTA bilaterally  CV - RRR, no murmurs, distal pulses intact  Abd - soft, non-distended, non-tender, +BS  Extr - No cyanosis or edema    A/P 58 year old year old female in need of colonoscopy for screening. Risks, benefits, alternatives, and complications were discussed including the possibility of perforation and the patient agreed to proceed    Pj Vasques MD   "

## 2019-11-19 NOTE — BRIEF OP NOTE
[facility]   Brief Operative Note    Pre-operative diagnosis: Colon cancer screening [Z12.11]   Post-operative diagnosis single polyp, otherwise normal     Procedure: Procedure(s):  Colonoscopy, With Polypectomy And Biopsy   Surgeon(s): Surgeon(s) and Role:     * Pj Vasques MD - Primary   Estimated blood loss: * No values recorded between 11/19/2019 12:00 AM and 11/19/2019 12:48 PM *    Specimens: ID Type Source Tests Collected by Time Destination   A :  Polyp Large Intestine, Right/Ascending SURGICAL PATHOLOGY EXAM Pj Vasques MD 11/19/2019 12:40 PM       Findings: 1. 2 mm polyp ascending colon - removed  2. Colon otherwise normal

## 2019-11-19 NOTE — ANESTHESIA CARE TRANSFER NOTE
Patient: Amelia Michel    Procedure(s):  Colonoscopy, With Polypectomy And Biopsy    Diagnosis: Colon cancer screening [Z12.11]  Diagnosis Additional Information: No value filed.    Anesthesia Type:   General     Note:  Airway :Room Air  Patient transferred to:Phase II  Handoff Report: Identifed the Patient, Identified the Reponsible Provider, Reviewed the pertinent medical history, Discussed the surgical course, Reviewed Intra-OP anesthesia mangement and issues during anesthesia, Set expectations for post-procedure period and Allowed opportunity for questions and acknowledgement of understanding      Vitals: (Last set prior to Anesthesia Care Transfer)    CRNA VITALS  11/19/2019 1228 - 11/19/2019 1258      11/19/2019             Pulse:  87    SpO2:  97 %                Electronically Signed By: ELIZABETH Conrad CRNA  November 19, 2019  12:58 PM

## 2019-11-26 LAB — COPATH REPORT: NORMAL

## 2019-12-08 DIAGNOSIS — G72.81 CRITICAL ILLNESS MYOPATHY: ICD-10-CM

## 2019-12-08 DIAGNOSIS — G89.29 CHRONIC RIGHT-SIDED LOW BACK PAIN WITHOUT SCIATICA: ICD-10-CM

## 2019-12-08 DIAGNOSIS — M81.0 OSTEOPOROSIS, UNSPECIFIED OSTEOPOROSIS TYPE, UNSPECIFIED PATHOLOGICAL FRACTURE PRESENCE: ICD-10-CM

## 2019-12-08 DIAGNOSIS — M54.50 CHRONIC RIGHT-SIDED LOW BACK PAIN WITHOUT SCIATICA: ICD-10-CM

## 2019-12-08 DIAGNOSIS — G62.9 NEUROPATHY: ICD-10-CM

## 2019-12-10 ENCOUNTER — TELEPHONE (OUTPATIENT)
Dept: FAMILY MEDICINE | Facility: CLINIC | Age: 59
End: 2019-12-10

## 2019-12-10 RX ORDER — GABAPENTIN 100 MG/1
CAPSULE ORAL
Qty: 180 CAPSULE | Refills: 0 | Status: SHIPPED | OUTPATIENT
Start: 2019-12-10 | End: 2019-12-12

## 2019-12-10 RX ORDER — ALENDRONATE SODIUM 70 MG/1
TABLET ORAL
Qty: 12 TABLET | Refills: 2 | Status: SHIPPED | OUTPATIENT
Start: 2019-12-10 | End: 2019-12-12

## 2019-12-10 NOTE — TELEPHONE ENCOUNTER
CENTRAL PRIOR AUTHORIZATION  [phone]    PA Initiation    Medication: Gabapentin 100MG CAPSULES - INITIATED   Insurance Company: [employer] - Phone [phone] Fax [phone]  Pharmacy Filling the Rx: [id number] [place], [place] - [address] AT [facility] [address]  Filling Pharmacy Phone: [phone]  Filling Pharmacy Fax:    Start Date: 12/10/2019

## 2019-12-10 NOTE — TELEPHONE ENCOUNTER
Prior Authorization Retail Medication Request    Medication/Dose:   ICD code (if different than what is on RX):    Chronic right-sided low back pain without sciatica [M54.5, G89.29]       Neuropathy [G62.9]       Critical illness myopathy [G72.81]           Previously Tried and Failed:  na  Rationale:  na    Insurance Name:  [employer]  Insurance ID:  [id number]      Pharmacy Information (if different than what is on RX)  Name:  [facility]  Phone:  [phone]

## 2019-12-10 NOTE — TELEPHONE ENCOUNTER
Routing gabapentin refill request to provider for review/approval because:  Drug not on the [facility] refill protocol   Frederic Power RN

## 2019-12-12 ENCOUNTER — APPOINTMENT (OUTPATIENT)
Dept: GENERAL RADIOLOGY | Facility: CLINIC | Age: 59
DRG: 244 | End: 2019-12-12
Attending: FAMILY MEDICINE
Payer: COMMERCIAL

## 2019-12-12 ENCOUNTER — HOSPITAL ENCOUNTER (INPATIENT)
Facility: CLINIC | Age: 59
LOS: 2 days | Discharge: HOME | DRG: 244 | End: 2019-12-14
Attending: FAMILY MEDICINE | Admitting: INTERNAL MEDICINE
Payer: COMMERCIAL

## 2019-12-12 DIAGNOSIS — R00.1 BRADYCARDIA: ICD-10-CM

## 2019-12-12 DIAGNOSIS — Z79.01 LONG TERM (CURRENT) USE OF ANTICOAGULANTS: ICD-10-CM

## 2019-12-12 DIAGNOSIS — I89.0 LYMPHEDEMA OF BOTH LOWER EXTREMITIES: Primary | ICD-10-CM

## 2019-12-12 PROBLEM — R00.2 HEART PALPITATIONS: Status: ACTIVE | Noted: 2019-12-12

## 2019-12-12 LAB
ALBUMIN SERPL-MCNC: 4 G/DL (ref 3.4–5)
ALP SERPL-CCNC: 70 U/L (ref 40–150)
ALT SERPL W P-5'-P-CCNC: 60 U/L (ref 0–50)
ANION GAP SERPL CALCULATED.3IONS-SCNC: 7 MMOL/L (ref 3–14)
AST SERPL W P-5'-P-CCNC: ABNORMAL U/L (ref 0–45)
BASOPHILS # BLD AUTO: 0 10E9/L (ref 0–0.2)
BASOPHILS NFR BLD AUTO: 0.4 %
BILIRUB SERPL-MCNC: 0.8 MG/DL (ref 0.2–1.3)
BUN SERPL-MCNC: 19 MG/DL (ref 7–30)
CALCIUM SERPL-MCNC: 9.8 MG/DL (ref 8.5–10.1)
CHLORIDE SERPL-SCNC: 105 MMOL/L (ref 94–109)
CO2 SERPL-SCNC: 26 MMOL/L (ref 20–32)
CREAT SERPL-MCNC: 0.78 MG/DL (ref 0.52–1.04)
DIFFERENTIAL METHOD BLD: ABNORMAL
DIGOXIN SERPL-MCNC: 0.8 UG/L (ref 0.5–2)
EOSINOPHIL # BLD AUTO: 0 10E9/L (ref 0–0.7)
EOSINOPHIL NFR BLD AUTO: 0.4 %
ERYTHROCYTE [DISTWIDTH] IN BLOOD BY AUTOMATED COUNT: 12.5 % (ref 10–15)
GFR SERPL CREATININE-BSD FRML MDRD: 83 ML/MIN/{1.73_M2}
GLUCOSE SERPL-MCNC: 104 MG/DL (ref 70–99)
HCT VFR BLD AUTO: 42.1 % (ref 35–47)
HGB BLD-MCNC: 14.2 G/DL (ref 11.7–15.7)
IMM GRANULOCYTES # BLD: 0 10E9/L (ref 0–0.4)
IMM GRANULOCYTES NFR BLD: 0.3 %
LYMPHOCYTES # BLD AUTO: 0.4 10E9/L (ref 0.8–5.3)
LYMPHOCYTES NFR BLD AUTO: 5.6 %
MCH RBC QN AUTO: 35.6 PG (ref 26.5–33)
MCHC RBC AUTO-ENTMCNC: 33.7 G/DL (ref 31.5–36.5)
MCV RBC AUTO: 106 FL (ref 78–100)
MONOCYTES # BLD AUTO: 0.4 10E9/L (ref 0–1.3)
MONOCYTES NFR BLD AUTO: 6.3 %
NEUTROPHILS # BLD AUTO: 6 10E9/L (ref 1.6–8.3)
NEUTROPHILS NFR BLD AUTO: 87 %
NRBC # BLD AUTO: 0 10*3/UL
NRBC BLD AUTO-RTO: 0 /100
NT-PROBNP SERPL-MCNC: 485 PG/ML (ref 0–900)
PLATELET # BLD AUTO: 150 10E9/L (ref 150–450)
POTASSIUM SERPL-SCNC: 4.2 MMOL/L (ref 3.4–5.3)
PROT SERPL-MCNC: 7.6 G/DL (ref 6.8–8.8)
RBC # BLD AUTO: 3.99 10E12/L (ref 3.8–5.2)
SODIUM SERPL-SCNC: 138 MMOL/L (ref 133–144)
TROPONIN I SERPL-MCNC: <0.015 UG/L (ref 0–0.04)
TSH SERPL DL<=0.005 MIU/L-ACNC: 1.63 MU/L (ref 0.4–4)
WBC # BLD AUTO: 6.8 10E9/L (ref 4–11)

## 2019-12-12 PROCEDURE — 93010 ELECTROCARDIOGRAM REPORT: CPT | Mod: Z6 | Performed by: FAMILY MEDICINE

## 2019-12-12 PROCEDURE — 93005 ELECTROCARDIOGRAM TRACING: CPT

## 2019-12-12 PROCEDURE — 82040 ASSAY OF SERUM ALBUMIN: CPT

## 2019-12-12 PROCEDURE — 99221 1ST HOSP IP/OBS SF/LOW 40: CPT | Performed by: INTERNAL MEDICINE

## 2019-12-12 PROCEDURE — 84484 ASSAY OF TROPONIN QUANT: CPT | Performed by: FAMILY MEDICINE

## 2019-12-12 PROCEDURE — 21400000 ZZH R&B CCU UMMC

## 2019-12-12 PROCEDURE — 99222 1ST HOSP IP/OBS MODERATE 55: CPT | Mod: GC | Performed by: INTERNAL MEDICINE

## 2019-12-12 PROCEDURE — 80048 BASIC METABOLIC PNL TOTAL CA: CPT

## 2019-12-12 PROCEDURE — 93005 ELECTROCARDIOGRAM TRACING: CPT | Performed by: FAMILY MEDICINE

## 2019-12-12 PROCEDURE — 84460 ALANINE AMINO (ALT) (SGPT): CPT

## 2019-12-12 PROCEDURE — 83880 ASSAY OF NATRIURETIC PEPTIDE: CPT | Performed by: FAMILY MEDICINE

## 2019-12-12 PROCEDURE — 93010 ELECTROCARDIOGRAM REPORT: CPT | Performed by: INTERNAL MEDICINE

## 2019-12-12 PROCEDURE — 99285 EMERGENCY DEPT VISIT HI MDM: CPT | Mod: 25 | Performed by: FAMILY MEDICINE

## 2019-12-12 PROCEDURE — 71045 X-RAY EXAM CHEST 1 VIEW: CPT

## 2019-12-12 PROCEDURE — 80162 ASSAY OF DIGOXIN TOTAL: CPT | Performed by: FAMILY MEDICINE

## 2019-12-12 PROCEDURE — 85025 COMPLETE CBC W/AUTO DIFF WBC: CPT | Performed by: FAMILY MEDICINE

## 2019-12-12 PROCEDURE — 82247 BILIRUBIN TOTAL: CPT

## 2019-12-12 PROCEDURE — 84075 ASSAY ALKALINE PHOSPHATASE: CPT

## 2019-12-12 PROCEDURE — 25000132 ZZH RX MED GY IP 250 OP 250 PS 637: Performed by: STUDENT IN AN ORGANIZED HEALTH CARE EDUCATION/TRAINING PROGRAM

## 2019-12-12 PROCEDURE — 25000132 ZZH RX MED GY IP 250 OP 250 PS 637: Performed by: FAMILY MEDICINE

## 2019-12-12 PROCEDURE — 84443 ASSAY THYROID STIM HORMONE: CPT | Performed by: FAMILY MEDICINE

## 2019-12-12 PROCEDURE — 84155 ASSAY OF PROTEIN SERUM: CPT

## 2019-12-12 RX ORDER — MULTIPLE VITAMINS W/ MINERALS TAB 9MG-400MCG
1 TAB ORAL DAILY
Status: DISCONTINUED | OUTPATIENT
Start: 2019-12-13 | End: 2019-12-14 | Stop reason: HOSPADM

## 2019-12-12 RX ORDER — LIDOCAINE 40 MG/G
CREAM TOPICAL
Status: DISCONTINUED | OUTPATIENT
Start: 2019-12-12 | End: 2019-12-14 | Stop reason: HOSPADM

## 2019-12-12 RX ORDER — GABAPENTIN 100 MG/1
200 CAPSULE ORAL 3 TIMES DAILY
COMMUNITY
End: 2020-04-23

## 2019-12-12 RX ORDER — LIDOCAINE 40 MG/G
CREAM TOPICAL
Status: DISCONTINUED | OUTPATIENT
Start: 2019-12-12 | End: 2019-12-13

## 2019-12-12 RX ORDER — PREDNISONE 5 MG/1
5 TABLET ORAL DAILY
Status: DISCONTINUED | OUTPATIENT
Start: 2019-12-13 | End: 2019-12-14 | Stop reason: HOSPADM

## 2019-12-12 RX ORDER — GABAPENTIN 100 MG/1
200 CAPSULE ORAL 3 TIMES DAILY
Status: DISCONTINUED | OUTPATIENT
Start: 2019-12-12 | End: 2019-12-12

## 2019-12-12 RX ORDER — ACETAMINOPHEN 500 MG
500 TABLET ORAL EVERY 6 HOURS PRN
Status: ON HOLD | COMMUNITY
End: 2023-04-17

## 2019-12-12 RX ORDER — SPIRONOLACTONE 25 MG/1
25 TABLET ORAL DAILY
Status: DISCONTINUED | OUTPATIENT
Start: 2019-12-13 | End: 2019-12-14 | Stop reason: HOSPADM

## 2019-12-12 RX ORDER — GABAPENTIN 100 MG/1
200 CAPSULE ORAL 3 TIMES DAILY
Status: DISCONTINUED | OUTPATIENT
Start: 2019-12-12 | End: 2019-12-14 | Stop reason: HOSPADM

## 2019-12-12 RX ORDER — MULTIVITAMIN WITH IRON
1 TABLET ORAL AT BEDTIME
COMMUNITY
End: 2022-04-03

## 2019-12-12 RX ORDER — ONDANSETRON 2 MG/ML
4 INJECTION INTRAMUSCULAR; INTRAVENOUS EVERY 6 HOURS PRN
Status: DISCONTINUED | OUTPATIENT
Start: 2019-12-12 | End: 2019-12-14 | Stop reason: HOSPADM

## 2019-12-12 RX ORDER — ACETAMINOPHEN 325 MG/1
650 TABLET ORAL EVERY 4 HOURS PRN
Status: DISCONTINUED | OUTPATIENT
Start: 2019-12-12 | End: 2019-12-14 | Stop reason: HOSPADM

## 2019-12-12 RX ORDER — PROCHLORPERAZINE MALEATE 5 MG
10 TABLET ORAL EVERY 6 HOURS PRN
Status: DISCONTINUED | OUTPATIENT
Start: 2019-12-12 | End: 2019-12-14 | Stop reason: HOSPADM

## 2019-12-12 RX ORDER — GABAPENTIN 100 MG/1
200 CAPSULE ORAL ONCE
Status: COMPLETED | OUTPATIENT
Start: 2019-12-12 | End: 2019-12-12

## 2019-12-12 RX ORDER — SPIRONOLACTONE 25 MG/1
25 TABLET ORAL DAILY
COMMUNITY
End: 2020-10-28

## 2019-12-12 RX ORDER — HYDROXYCHLOROQUINE SULFATE 200 MG/1
200 TABLET, FILM COATED ORAL DAILY
Status: DISCONTINUED | OUTPATIENT
Start: 2019-12-13 | End: 2019-12-14 | Stop reason: HOSPADM

## 2019-12-12 RX ORDER — TOLTERODINE 4 MG/1
4 CAPSULE, EXTENDED RELEASE ORAL DAILY
Status: DISCONTINUED | OUTPATIENT
Start: 2019-12-12 | End: 2019-12-14 | Stop reason: HOSPADM

## 2019-12-12 RX ORDER — FUROSEMIDE 20 MG
20 TABLET ORAL DAILY
Status: DISCONTINUED | OUTPATIENT
Start: 2019-12-12 | End: 2019-12-14 | Stop reason: HOSPADM

## 2019-12-12 RX ORDER — SPIRONOLACTONE 25 MG/1
25 TABLET ORAL DAILY
Status: DISCONTINUED | OUTPATIENT
Start: 2019-12-12 | End: 2019-12-12

## 2019-12-12 RX ORDER — ALENDRONATE SODIUM 70 MG/1
70 TABLET ORAL
COMMUNITY
End: 2020-09-11

## 2019-12-12 RX ORDER — PROCHLORPERAZINE 25 MG
25 SUPPOSITORY, RECTAL RECTAL EVERY 12 HOURS PRN
Status: DISCONTINUED | OUTPATIENT
Start: 2019-12-12 | End: 2019-12-14 | Stop reason: HOSPADM

## 2019-12-12 RX ORDER — ONDANSETRON 4 MG/1
4 TABLET, ORALLY DISINTEGRATING ORAL EVERY 6 HOURS PRN
Status: DISCONTINUED | OUTPATIENT
Start: 2019-12-12 | End: 2019-12-14 | Stop reason: HOSPADM

## 2019-12-12 RX ADMIN — GABAPENTIN 200 MG: 100 CAPSULE ORAL at 21:40

## 2019-12-12 RX ADMIN — APIXABAN 5 MG: 5 TABLET, FILM COATED ORAL at 21:40

## 2019-12-12 RX ADMIN — GABAPENTIN 200 MG: 100 CAPSULE ORAL at 14:55

## 2019-12-12 ASSESSMENT — ENCOUNTER SYMPTOMS
EYE REDNESS: 0
SINUS PAIN: 0
WEAKNESS: 1
COLOR CHANGE: 0
ABDOMINAL PAIN: 0
SHORTNESS OF BREATH: 0
SINUS PRESSURE: 0
NAUSEA: 1
DIARRHEA: 0
HALLUCINATIONS: 0
FEVER: 0
VOMITING: 0
ARTHRALGIAS: 0
LIGHT-HEADEDNESS: 1
PALPITATIONS: 1
CONFUSION: 0
NECK STIFFNESS: 0
DECREASED CONCENTRATION: 0
DYSPHORIC MOOD: 0
HEADACHES: 1
DIFFICULTY URINATING: 0
ACTIVITY CHANGE: 1

## 2019-12-12 ASSESSMENT — ACTIVITIES OF DAILY LIVING (ADL)
COGNITION: 0 - NO COGNITION ISSUES REPORTED
RETIRED_EATING: 0-->INDEPENDENT
AMBULATION: 1-->ASSISTIVE EQUIPMENT
RETIRED_COMMUNICATION: 0-->UNDERSTANDS/COMMUNICATES WITHOUT DIFFICULTY
TOILETING: 0-->INDEPENDENT
FALL_HISTORY_WITHIN_LAST_SIX_MONTHS: NO
ADLS_ACUITY_SCORE: 13
DRESS: 0-->INDEPENDENT
TRANSFERRING: 1-->ASSISTIVE EQUIPMENT
SWALLOWING: 0-->SWALLOWS FOODS/LIQUIDS WITHOUT DIFFICULTY
BATHING: 0-->INDEPENDENT

## 2019-12-12 ASSESSMENT — MIFFLIN-ST. JEOR
SCORE: 1073.02
SCORE: 1089.81

## 2019-12-12 NOTE — TELEPHONE ENCOUNTER
Prior Authorization Approval    Authorization Effective Date: 11/10/2019  Authorization Expiration Date: 12/9/2020  Medication: Gabapentin 100MG CAPSULES - APPROVED   Approved Dose/Quantity:   Reference #: [id number]   Insurance Company: [employer] - Phone [phone] Fax [phone]  Expected CoPay:       CoPay Card Available: No    Foundation Assistance Needed:    Which Pharmacy is filling the prescription (Not needed for infusion/clinic administered): [id number] [place], [place] - [address] AT [facility] OF [address]  Pharmacy Notified: Yes  Patient Notified: Yes - THE PHARMACY WILL CONTACT PATIENT WHEN READY.

## 2019-12-12 NOTE — ED NOTES
[facility], [place]   ED Nurse to Floor Handoff     Amelia Michel is a 59 year old female who speaks English and lives with a spouse,  in a home  They arrived in the ED by car from home    ED Chief Complaint: Palpitations    ED Dx;   Final diagnoses:   Bradycardia         Needed?: No    Allergies:   Allergies   Allergen Reactions     Blood Transfusion Related (Informational Only) Hives     Hives with platelets. Give benadryl premedication.     Metoprolol Other (See Comments)     Pt and  report that metoprolol does not work for her and also reports feeling unwell with this medication. She has been able to tolerate atenolol, which as worked in controlling her HR.      No Clinical Screening - See Comments      Penicillin Allergy Skin Test not performed, see antimicrobial management team progress note 7/5/17.       Penicillins      Tape [Adhesive Tape] Rash   .  Past Medical Hx:   Past Medical History:   Diagnosis Date     Arthritis      Cardiac arrest (H)      History of chemotherapy      Hypertension      Neuropathy       Baseline Mental status: WDL  Current Mental Status changes: at basesline    Infection present or suspected this encounter: no  Sepsis suspected: No  Isolation type: Contact     Activity level - Baseline/Home:  Independent  Activity Level - Current:   Independent has dyspnea on exertion     Bariatric equipment needed?: No    In the ED these meds were given:   Medications   lidocaine 1 % 0.1-1 mL (has no administration in time range)   lidocaine (LMX4) cream (has no administration in time range)   sodium chloride (PF) 0.9% PF flush 3 mL (has no administration in time range)   sodium chloride (PF) 0.9% PF flush 3 mL (3 mLs Intracatheter Given 12/12/19 1151)   gabapentin (NEURONTIN) capsule 200 mg (200 mg Oral Given 12/12/19 6255)       Drips running?  No    Home pump  No    Current LDAs  Peripheral IV 12/12/19 Right Hand (Active)   Site Assessment WDL  12/12/2019 11:52 AM   Line Status Saline locked 12/12/2019 11:52 AM   Phlebitis Scale 0-->no symptoms 12/12/2019 11:52 AM   Infiltration Scale 0 12/12/2019 11:52 AM   Number of days: 0       Wound 08/04/17 Upper Arm Extravasation Red, slough (Active)   Number of days: 860       Wound 08/12/17 Left;Upper Arm Extravasation L upper arm wound from IV infiltration (Active)   Number of days: 852       Wound with Packing 03/07/18 Left Antecubital Extravasation (Active)   Number of days: 645       Incision/Surgical Site 05/22/18 Left Arm (Active)   Number of days: 569       Labs results:   Labs Ordered and Resulted from Time of ED Arrival Up to the Time of Departure from the ED   CBC WITH PLATELETS DIFFERENTIAL - Abnormal; Notable for the following components:       Result Value     (*)     MCH 35.6 (*)     Absolute Lymphocytes 0.4 (*)     All other components within normal limits   COMPREHENSIVE METABOLIC PANEL - Abnormal; Notable for the following components:    Glucose 104 (*)     ALT 60 (*)     All other components within normal limits   NT PROBNP INPATIENT   TROPONIN I   TSH   DIGOXIN LEVEL   PULSE OXIMETRY NURSING   CARDIAC CONTINUOUS MONITORING   PERIPHERAL IV CATHETER       Imaging Studies:   Recent Results (from the past 24 hour(s))   XR Chest Port 1 View    Narrative    EXAM: XR CHEST PORT 1 VW  12/12/2019 12:40 PM     HISTORY:  bradycardia      COMPARISON:  10/20/2019 CT CAP an 3/7/2018 chest x-ray    FINDINGS:   Semiupright AP view of the chest. Median sternotomy wires are intact.  Trachea is midline. Cardiac silhouette is enlarged though unchanged  from prior. Diffuse interstitial opacities with a basilar  predominance. No pneumothorax. No significant pleural effusion.  Osseous structures and upper abdomen unremarkable.      Impression    IMPRESSION:   Cardiomegaly with mild pulmonary venous congestion.       Recent vital signs:   /75   Pulse (!) 45   Temp 98.7  F (37.1  C) (Oral)   Resp 16    "Ht 1.473 m (4' 10\")   Wt 62.5 kg (137 lb 12.8 oz)   SpO2 97%   BMI 28.80 kg/m      [place] Coma Scale Score: 15 (12/12/19 1132)       Cardiac Rhythm: Bradycardia  Pt needs tele? Yes  Skin/wound Issues: None    Code Status: Full Code    Pain control: good    Nausea control: good    Abnormal labs/tests/findings requiring intervention:     Family present during ED course? Yes   Family Comments/Social Situation comments:     Tasks needing completion: None    Gumaro Mckeon, RN  [phone] [facility]      "

## 2019-12-12 NOTE — H&P
[facility] Cardiology History and Physical    Amelia Michel MRN# [id number]   Age: 59 year old YOB: 1960     Date of Admission:  12/12/2019    Primary care provider: Gala Strinegr          Assessment and Plan:   Amelia Michel is a 59 year old female with PMH of VSD s/p repair (1969), RA, HTN, insomnia, VF cardiac arrest, DLBCL, exudative pericardial effusion, and atrial tachyarrhythmias who presents with palpitations. Found to have bradycardia    #Symptomatic bradycardia  #H/o atrial tachyarrhythmias  Patient presenting with nausea, headache, and feeling of palpitations in setting of bradycardia in setting of long-standing history of atrial tachyarrhythmias currently on atenolol and digoxin. Follows with Dr. Eaton. Has been compliant with meds, no changes recently. No other illnesses. Found to have pulses in the 40s with stable BP. No other particular complaints. Seen by Dr. Eaton in ED with plan for holding of meds overnight with plan for repeat evaluation in AM. Does have h/o arrest in setting of bradycardia, so will need to monitor closely. May need pacemaker during course of admission  -hold digoxin, atenolol  -telemetry  -trend lytes  -pacer pads in place  -EP consulted  -continue apixaban     Chronic Problems  #HTN  -continue lasix, aldactone; other meds as above    #RA  -continue HCQ, prednisone, gabapentin    FEN: regular, NPO @ [facility] in case of EP procedure  Prophylaxis: apixaban  Code Status: Full  Disposition: Admit to Cards 1    Patient seen and discussed with Dr. Dickson, who agrees with above plan.    Daphne Ralph MD  Internal Medicine PGY 3  Pager: [phone]          Chief Complaint:   palpitations         History of Present Illness:   Amelia Michel is a 59 year old female with PMH of VSD s/p repair (1969), RA, HTN, insomnia, VF cardiac arrest, DLBCL, exudative pericardial effusion, and atrial tachyarrhythmias who presents with palpitations.    Last saw Dr. Eaton of EP on  2/22/19. At that time, was noted to be feeling well and without complaints. Was on atenolol and digoxin for rate control and apixaban for AC.     Now presents today with complaint of palpitations as well as headache and nausea similar to last time when she was in atrial fibrillation. This started maybe on Monday or Tuesday of this week. States she hasn't been able to eat or drink much since that time. No other complaints aside from this. States she has generally felt well overall.  No fevers, chills, chest pain, SOB, abdominal pain, diarrhea, dysuria, weight change.            Review of Systems:   10 point ROS negative unless noted above         Past Medical History:     cMRI 1/24/18  CONCLUSIONS:  Loculated exudative pericardial effusion that is beginning to organize, with diffuse  pericardial enhancement consistent with ongoing inflammation. Normal LV fxn, LVEF 54% and RVEF 49%. At  least moderate, possibly severe tricuspid regurgitation. Compared to MRI from 03/2018, effusion is largely  unchanged in size (maybe a bit smaller) but is starting to organize. No change in pericardial enhancement.       Medical History reviewed.   Past Medical History:   Diagnosis Date     Arthritis      Cardiac arrest (H)      History of chemotherapy      Hypertension      Neuropathy              Past Surgical History:   Surgical History reviewed.   Past Surgical History:   Procedure Laterality Date     ANESTHESIA CARDIOVERSION N/A 5/17/2017    Procedure: ANESTHESIA CARDIOVERSION;  ANESTHESIA CARDIOVERSION;  Surgeon: GENERIC ANESTHESIA PROVIDER;  Location: [facility]     ANESTHESIA CARDIOVERSION  3/12/2018    Procedure: ANESTHESIA CARDIOVERSION;;  Surgeon: GENERIC ANESTHESIA PROVIDER;  Location: [facility]     ANESTHESIA CARDIOVERSION N/A 3/23/2018    Procedure: ANESTHESIA CARDIOVERSION;  Anesthesia Cardioversion ;  Surgeon: GENERIC ANESTHESIA PROVIDER;  Location: [facility] OR     ANESTHESIA CARDIOVERSION N/A 4/12/2018    Procedure: ANESTHESIA  CARDIOVERSION;  Cardioversion;  Surgeon: GENERIC ANESTHESIA PROVIDER;  Location: [facility] OR     ARTHRODESIS WRIST  2000    Right wrist     BONE MARROW ASPIRATE &BIOPSY  7/12/2017          COLONOSCOPY N/A 11/19/2019    Procedure: Colonoscopy, With Polypectomy And Biopsy;  Surgeon: Pj Vasques MD;  Location: [facility]     EXCISE LESION UPPER EXTREMITY Left 5/22/2018    Procedure: EXCISE LESION UPPER EXTREMITY;  Left Arm Wound Excision And Closure, Possible Submuscular Transposition of Ulnar Nerve  (Choice Anesthesia);  Surgeon: Kevin Sheldon MD;  Location: [facility] OR     FOOT SURGERY      4 left and 2 right     RELEASE CARPAL TUNNEL BILATERAL       REPAIR VENTRICULAR SEPTAL DEFECT  1969     TRANSPOSITION ULNAR NERVE (ELBOW) Left 5/22/2018    Procedure: TRANSPOSITION ULNAR NERVE (ELBOW);;  Surgeon: Kevin Sheldon MD;  Location: [facility] OR             Social History:   Social History reviewed.   Social History     Tobacco Use     Smoking status: Never Smoker     Smokeless tobacco: Never Used   Substance Use Topics     Alcohol use: Yes     Comment: none             Family History:   Family History reviewed.  Family History   Problem Relation Age of Onset     Breast Cancer Mother      Hypertension Mother      Alzheimer Disease Mother      Cerebrovascular Disease Mother      Hypertension Father      Cerebrovascular Disease Father      Atrial fibrillation Father      Lymphoma Father      Prostate Cancer Father      Diabetes Paternal Grandmother      Alzheimer Disease Maternal Grandmother         likely     Arthritis Maternal Grandfather      Pneumonia Maternal Grandfather              Allergies:     Allergies   Allergen Reactions     Blood Transfusion Related (Informational Only) Hives     Hives with platelets. Give benadryl premedication.     Metoprolol Other (See Comments)     Pt and  report that metoprolol does not work for her and also reports feeling unwell with this medication. She has been able to tolerate atenolol, which  "as worked in controlling her HR.      No Clinical Screening - See Comments      Penicillin Allergy Skin Test not performed, see antimicrobial management team progress note 7/5/17.       Penicillins      Tape [Adhesive Tape] Rash             Medications:   Medications Reviewed.   Current Facility-Administered Medications   Medication     lidocaine (LMX4) cream     lidocaine 1 % 0.1-1 mL     sodium chloride (PF) 0.9% PF flush 3 mL     sodium chloride (PF) 0.9% PF flush 3 mL     Current Outpatient Medications   Medication Sig     acetaminophen (TYLENOL) 325 MG tablet Take 2 tablets (650 mg) by mouth every 4 hours as needed for mild pain, fever or headaches     alendronate (FOSAMAX) 70 MG tablet TAKE 1 TABLET BY MOUTH ONCE PER WEEK     apixaban ANTICOAGULANT (ELIQUIS) 5 MG tablet Take 1 tablet (5 mg) by mouth 2 times daily     atenolol (TENORMIN) 25 MG tablet Take 1 tablet (25 mg) by mouth 2 times daily     calcium-vitamin D (CALTRATE) 600-400 MG-UNIT per tablet Take 2 tablets by mouth every morning     digoxin (LANOXIN) 125 MCG tablet Take 1 tablet (125 mcg) by mouth daily     furosemide (LASIX) 20 MG tablet Take 1 tablet (20 mg) by mouth daily     gabapentin (NEURONTIN) 100 MG capsule TAKE 2 CAPSULES BY MOUTH THREE TIMES DAILY     hydroxychloroquine (PLAQUENIL) 200 MG tablet Take 1 tablet (200 mg) by mouth daily     multivitamin, therapeutic with minerals (THERA-VIT-M) TABS tablet Take 1 tablet by mouth daily     predniSONE (DELTASONE) 5 MG tablet Take 1 tablet (5 mg) by mouth daily     spironolactone (ALDACTONE) 25 MG tablet TAKE 1 TABLET BY MOUTH DAILY     tolterodine ER (DETROL LA) 4 MG 24 hr capsule Take 1 capsule (4 mg) by mouth daily             Physical Exam:   Vitals were reviewed.  Blood pressure 123/72, pulse (!) 46, temperature 98.7  F (37.1  C), temperature source Oral, resp. rate 14, height 1.473 m (4' 10\"), weight 62.5 kg (137 lb 12.8 oz), SpO2 96 %, not currently breastfeeding.    General: NAD on " RA  HEENT: at/nc, MMM, PERRLA, EOM intact, no JVD  CV: bradycardic, irregular, normal S1S2  Resp: Clear to auscultation bilaterally  Abd: Soft, non-tender, BS+, no masses appreciated  Skin: Not jaundiced, no rash, no ecchymoses  Extremities: warm and well perfused, palpable pulses, no edema  Neuro: alert, interactive, speech fluent         Data:   No intake/output data recorded.    ROUTINE LABS (Last four results)  CMP  Recent Labs   Lab 12/12/19  1153      POTASSIUM 4.2   CHLORIDE 105   CO2 26   ANIONGAP 7   *   BUN 19   CR 0.78   GFRESTIMATED 83   GFRESTBLACK >90   VIKTORIYA 9.8   PROTTOTAL 7.6   ALBUMIN 4.0   BILITOTAL 0.8   ALKPHOS 70   AST Canceled, Test credited   ALT 60*     CBC  Recent Labs   Lab 12/12/19  1153   WBC 6.8   RBC 3.99   HGB 14.2   HCT 42.1   *   MCH 35.6*   MCHC 33.7   RDW 12.5        INRNo lab results found in last 7 days.  Arterial Blood GasNo lab results found in last 7 days.    Imaging:  Reviewed in chart    I have seen, interviewed, and examined patient. I have reviewed the laboratory tests, imaging, and other investigations. I have reviewed the management plan with the patient. I discussed with the team and agree with the findings and plan in this resident/fellow/nurse practitioner's note. In addition, changes in the physical examination, assessment and plan have been incorporated into the note by myself, as to make it a single cohesive document.       Sandy Michele MD, MS  Cardiology/Cardiac EP Attending Staff

## 2019-12-12 NOTE — TELEPHONE ENCOUNTER
Received hand off call.  Patient reports symptoms started last evening,nausea and headache.  Took her vitals, /80 HR 41.  Today, feels short of breath with activity. /68 HR 39-41 O2 sat 98% RA.  Patient held dose of digoxin this morning due to HR 39.  Advised  to ED now, patient agrees.  Patient plans to go [facility].  Trupti Elliott RN

## 2019-12-12 NOTE — PHARMACY-ADMISSION MEDICATION HISTORY
Admission medication history interview status for the 12/12/2019 admission is complete. See Epic admission navigator for allergy information, pharmacy, prior to admission medications and immunization status.     Medication history interview sources:  Patient and     Changes made to PTA medication list (reason)  Added:   -Magnesium 250 mg tablet by mouth at bedtime for sleep  Deleted: NA  Changed:   -Acetaminophen 650 mg every 6 hours as needed --> 500 mg every 6 hours as needed  -Caltrate 2 tablets by mouth every morning --> 2 tablets by mouth daily, as patient reports taking at 1400 daily.     Additional medication history information (including reliability of information, actions taken by pharmacist):  -Bree is very knowledgeable on her current medication regimen, including last dose, strength, and frequency.   -Atenolol will be discontinued per the patient.      Prior to Admission medications    Medication Sig Last Dose Taking? Auth Provider   acetaminophen (TYLENOL) 500 MG tablet Take 500 mg by mouth every 6 hours as needed for mild pain 12/11/2019 at 1400 Yes Unknown, Entered By History   alendronate (FOSAMAX) 70 MG tablet Take 70 mg by mouth every 7 days On Monday 12/9/2019 at 0800 Yes Unknown, Entered By History   apixaban ANTICOAGULANT (ELIQUIS) 5 MG tablet Take 1 tablet (5 mg) by mouth 2 times daily 12/12/2019 at 0800 Yes Juan Eaton MD   atenolol (TENORMIN) 25 MG tablet Take 1 tablet (25 mg) by mouth 2 times daily 12/12/2019 at 0800 Yes Lenore Rodriguez MD   calcium carbonate 600 mg-vitamin D 400 units (CALTRATE) 600-400 MG-UNIT per tablet Take 2 tablets by mouth daily 12/11/2019 at 1400 Yes Unknown, Entered By History   digoxin (LANOXIN) 125 MCG tablet Take 1 tablet (125 mcg) by mouth daily 12/11/2019 at 0800 Yes Juan Eaton MD   furosemide (LASIX) 20 MG tablet Take 1 tablet (20 mg) by mouth daily 12/12/2019 at 0800 Yes Juan Eaton MD   gabapentin (NEURONTIN) 100 MG capsule Take 200  mg by mouth 3 times daily 12/12/2019 at 0800 Yes Unknown, Entered By History   hydroxychloroquine (PLAQUENIL) 200 MG tablet Take 1 tablet (200 mg) by mouth daily 12/12/2019 at 0800 Yes Frederic Gongora APRN CNP   magnesium 250 MG tablet Take 1 tablet by mouth At Bedtime 12/11/2019 at 2200 Yes Unknown, Entered By History   multivitamin, therapeutic with minerals (THERA-VIT-M) TABS tablet Take 1 tablet by mouth daily 12/11/2019 at 1400 Yes Jaimla Cordova MD   predniSONE (DELTASONE) 5 MG tablet Take 1 tablet (5 mg) by mouth daily 12/12/2019 at 0800 Yes Frederic Gongora APRN CNP   spironolactone (ALDACTONE) 25 MG tablet Take 25 mg by mouth daily 12/12/2019 at 0800 Yes Unknown, Entered By History   tolterodine ER (DETROL LA) 4 MG 24 hr capsule Take 1 capsule (4 mg) by mouth daily 12/12/2019 at 0800 Yes Nayeli Pritchett MD       Medication history completed by:   Melani Benedict  PharmD Candidate  December 12, 2019

## 2019-12-12 NOTE — ED TRIAGE NOTES
"Triage Assessment & Note:    BP (!) 142/82   Pulse (!) 48   Temp 98.7  F (37.1  C) (Oral)   Resp 18   Ht 1.473 m (4' 10\")   Wt 62.5 kg (137 lb 12.8 oz)   SpO2 98%   BMI 28.80 kg/m      Patient presents with: PT c/o irregular HR, being low this time. PT denies being dizzy or light headed. PT reports an extensive cardiac hx.     Home Treatments/Remedies: None    Febrile / Afebrile? Afebrile     Duration of C/o:  Since 1700 yesterday    Cristóbal Borden RN  December 12, 2019      "

## 2019-12-12 NOTE — Clinical Note
Atrial pacing conducted 1:1 at 130 bpm with very long ND interval.  Proceeding with dual chamber PPM.

## 2019-12-12 NOTE — ED PROVIDER NOTES
[place] EMERGENCY DEPARTMENT ([facility])  December 12, 2019    History     Chief Complaint   Patient presents with     Palpitations     HPI  Amelia Michel is a 59 year old female with history notable for paroxysmal atrial fibrillation (s/p cardioversions x3 on Eliquis and Plaquenil), cardiac arrest, ventricular septal detect repair (1969), diffuse large B cell lymphoma (in remission), RA, thrombocytopenia, pulmonary HTN, HTN, and HLD who presents to the ED for palpitations. She states yesterday she developed palpitations, headaches, and nausea similar to when she previously was in atrial fibrillation. She denies vomiting, diarrhea, leg swelling, or chest pain.  Patient now presents here concerned that she could be in atrial fibrillation again.  In the past she has had a bradycardic atrial fib arrest possibly related to amiodarone etc.    Per chart, patient's last echocardiogram was in 2017 sand howed EF of 60 to 65%.  And also shows moderate tricuspid insufficienc dilation of IVC with abnormal respiratory variation in diameter.    PAST MEDICAL HISTORY  Past Medical History:   Diagnosis Date     Arthritis      Cardiac arrest (H)      History of chemotherapy      Hypertension      Neuropathy      PAST SURGICAL HISTORY  Past Surgical History:   Procedure Laterality Date     ANESTHESIA CARDIOVERSION N/A 5/17/2017    Procedure: ANESTHESIA CARDIOVERSION;  ANESTHESIA CARDIOVERSION;  Surgeon: GENERIC ANESTHESIA PROVIDER;  Location: [facility] OR     ANESTHESIA CARDIOVERSION  3/12/2018    Procedure: ANESTHESIA CARDIOVERSION;;  Surgeon: GENERIC ANESTHESIA PROVIDER;  Location: [facility]     ANESTHESIA CARDIOVERSION N/A 3/23/2018    Procedure: ANESTHESIA CARDIOVERSION;  Anesthesia Cardioversion ;  Surgeon: GENERIC ANESTHESIA PROVIDER;  Location: [facility] OR     ANESTHESIA CARDIOVERSION N/A 4/12/2018    Procedure: ANESTHESIA CARDIOVERSION;  Cardioversion;  Surgeon: GENERIC ANESTHESIA PROVIDER;  Location: [facility] OR     ARTHRODESIS  WRIST  2000    Right wrist     BONE MARROW ASPIRATE &BIOPSY  7/12/2017          COLONOSCOPY N/A 11/19/2019    Procedure: Colonoscopy, With Polypectomy And Biopsy;  Surgeon: Pj Vasques MD;  Location: [facility]     EXCISE LESION UPPER EXTREMITY Left 5/22/2018    Procedure: EXCISE LESION UPPER EXTREMITY;  Left Arm Wound Excision And Closure, Possible Submuscular Transposition of Ulnar Nerve  (Choice Anesthesia);  Surgeon: Kevin Sheldon MD;  Location: [facility] OR     FOOT SURGERY      4 left and 2 right     RELEASE CARPAL TUNNEL BILATERAL       REPAIR VENTRICULAR SEPTAL DEFECT  1969     TRANSPOSITION ULNAR NERVE (ELBOW) Left 5/22/2018    Procedure: TRANSPOSITION ULNAR NERVE (ELBOW);;  Surgeon: Kevin Sheldon MD;  Location: [facility] OR     FAMILY HISTORY  Family History   Problem Relation Age of Onset     Breast Cancer Mother      Hypertension Mother      Alzheimer Disease Mother      Cerebrovascular Disease Mother      Hypertension Father      Cerebrovascular Disease Father      Atrial fibrillation Father      Lymphoma Father      Prostate Cancer Father      Diabetes Paternal Grandmother      Alzheimer Disease Maternal Grandmother         likely     Arthritis Maternal Grandfather      Pneumonia Maternal Grandfather      SOCIAL HISTORY  Social History     Tobacco Use     Smoking status: Never Smoker     Smokeless tobacco: Never Used   Substance Use Topics     Alcohol use: Yes     Comment: none     MEDICATIONS  Current Facility-Administered Medications   Medication     acetaminophen (TYLENOL) tablet 650 mg     apixaban ANTICOAGULANT (ELIQUIS) tablet 5 mg     [START ON 12/13/2019] calcium carbonate 600 mg-vitamin D 400 units (CALTRATE) per tablet 2 tablet     furosemide (LASIX) tablet 20 mg     gabapentin (NEURONTIN) capsule 200 mg     [START ON 12/13/2019] hydroxychloroquine (PLAQUENIL) tablet 200 mg     lidocaine (LMX4) cream     lidocaine (LMX4) cream     lidocaine 1 % 0.1-1 mL     lidocaine 1 % 0.1-1 mL     [START ON 12/13/2019]  multivitamin w/minerals (THERA-VIT-M) tablet 1 tablet     ondansetron (ZOFRAN-ODT) ODT tab 4 mg    Or     ondansetron (ZOFRAN) injection 4 mg     Patient is already receiving anticoagulation with heparin, enoxaparin (LOVENOX), warfarin (COUMADIN)  or other anticoagulant medication     [START ON 12/13/2019] predniSONE (DELTASONE) tablet 5 mg     prochlorperazine (COMPAZINE) injection 10 mg    Or     prochlorperazine (COMPAZINE) tablet 10 mg    Or     prochlorperazine (COMPAZINE) Suppository 25 mg     sodium chloride (PF) 0.9% PF flush 3 mL     sodium chloride (PF) 0.9% PF flush 3 mL     sodium chloride (PF) 0.9% PF flush 3 mL     sodium chloride (PF) 0.9% PF flush 3 mL     [START ON 12/13/2019] spironolactone (ALDACTONE) tablet 25 mg     tolterodine ER (DETROL LA) 24 hr capsule 4 mg     ALLERGIES  Allergies   Allergen Reactions     Amiodarone Other (See Comments) and Difficulty breathing     Lethargic and had trouble breathing- occurred in 2017     Blood Transfusion Related (Informational Only) Hives     Hives with platelets. Give benadryl premedication.     Metoprolol Other (See Comments)     Pt and  report that metoprolol does not work for her and also reports feeling unwell with this medication. She has been able to tolerate atenolol, which as worked in controlling her HR.      No Clinical Screening - See Comments      Penicillin Allergy Skin Test not performed, see antimicrobial management team progress note 7/5/17.       Penicillins      Tape [Adhesive Tape] Rash         I have reviewed the Medications, Allergies, Past Medical and Surgical History, and Social History in the Epic system.    Review of Systems   Constitutional: Positive for activity change (neuropathy with foot brace with walker ). Negative for fever.   HENT: Negative for congestion, sinus pressure and sinus pain.    Eyes: Negative for redness.   Respiratory: Negative for shortness of breath.    Cardiovascular: Positive for palpitations.  "Negative for chest pain and leg swelling.   Gastrointestinal: Positive for nausea. Negative for abdominal pain, diarrhea and vomiting.   Genitourinary: Negative for difficulty urinating.   Musculoskeletal: Negative for arthralgias and neck stiffness.   Skin: Negative for color change.   Allergic/Immunologic: Negative for immunocompromised state.   Neurological: Positive for weakness, light-headedness (occ postural light headedness) and headaches. Negative for syncope.   Psychiatric/Behavioral: Negative for confusion, decreased concentration, dysphoric mood and hallucinations.   All other systems reviewed and are negative.      Physical Exam   BP: (!) 142/82  Pulse: (!) 48  Heart Rate: (!) 48  Temp: 98.7  F (37.1  C)  Resp: 18  Height: 147.3 cm (4' 10\")  Weight: 62.5 kg (137 lb 12.8 oz)  SpO2: 98 %      Physical Exam  Vitals signs and nursing note reviewed.   Constitutional:       General: She is not in acute distress.     Appearance: She is well-developed. She is not diaphoretic.      Comments: Patient alert and oriented sinus bradycardia noted 30s to 40s blood pressure stable patient not confused minimal symptoms currently   HENT:      Head: Normocephalic and atraumatic.      Nose: Nose normal.   Eyes:      General: No scleral icterus.     Extraocular Movements: Extraocular movements intact.      Conjunctiva/sclera: Conjunctivae normal.      Pupils: Pupils are equal, round, and reactive to light.   Neck:      Musculoskeletal: Normal range of motion and neck supple.   Cardiovascular:      Rate and Rhythm: Bradycardia present.      Heart sounds: Murmur present.      Comments: Bradycardia with occasional ectopy with systolic murmur heard  Pulmonary:      Effort: No respiratory distress.      Breath sounds: No stridor. No rales.   Abdominal:      General: There is no distension.      Palpations: There is no mass.      Tenderness: There is no abdominal tenderness.   Skin:     General: Skin is warm and dry.      " Capillary Refill: Capillary refill takes less than 2 seconds.      Coloration: Skin is not pale.      Findings: No erythema or rash.   Neurological:      General: No focal deficit present.      Mental Status: She is alert and oriented to person, place, and time. Mental status is at baseline.      Comments: Chronic neuropathy     Psychiatric:         Mood and Affect: Mood normal.         Behavior: Behavior normal.         Thought Content: Thought content normal.         Judgment: Judgment normal.         ED Course        Procedures   11:20 AM  The patient was seen and examined by Dr. Grimes in Room 34         Patient valuate here in the ER continue monitored.  EKG showed a borderline first-degree with sinus bradycardia noted.  T wave inversion noted also.  Chest x-ray done revealing no effusions pneumothorax cardiomegaly noted no significant changes identified.  Digoxin level was 0.8.  Troponin negative.  BNP is 45.  TSH is 1.63.  Sodium 138 potassium 4.2.  Glucose 104.  White count 6.8 hemoglobin 14.2.  Platelets 150.  In the ER patient otherwise been stable without decompensation consultation with EP cardiology did see the patient they know her well.  Recommendations are to admit this point it may be related to her atenolol to hold this admit to cards 1 service I talked to Dr. Michele who did accept the patient.  If the patient continues to have bradycardic episodes EP would consider pacemaker but at this point will see if holding medications will improve situation patient agrees with plan otherwise stable admitted.   present also.       EKG Interpretation:      Interpreted by Ken Grimes MD  Time reviewed: 11:23 AM  Symptoms at time of EKG: palpitations   Rhythm: sinus bradycardia  Rate: 40-50  Axis: Normal  Ectopy: none  Conduction: normal  ST Segments/ T Waves: Non-specific ST wave abnormalities  Q Waves: none  Comparison to prior: No old EKG available    Clinical Impression: sinus  bradycardia              Critical Care time:  none             Labs Ordered and Resulted from Time of ED Arrival Up to the Time of Departure from the ED   CBC WITH PLATELETS DIFFERENTIAL - Abnormal; Notable for the following components:       Result Value     (*)     MCH 35.6 (*)     Absolute Lymphocytes 0.4 (*)     All other components within normal limits   COMPREHENSIVE METABOLIC PANEL - Abnormal; Notable for the following components:    Glucose 104 (*)     ALT 60 (*)     All other components within normal limits   NT PROBNP INPATIENT   TROPONIN I   TSH   DIGOXIN LEVEL   PULSE OXIMETRY NURSING   CARDIAC CONTINUOUS MONITORING   PERIPHERAL IV CATHETER     Results for orders placed or performed during the hospital encounter of 12/12/19   CBC with platelets differential     Status: Abnormal   Result Value Ref Range    WBC 6.8 4.0 - 11.0 10e9/L    RBC Count 3.99 3.8 - 5.2 10e12/L    Hemoglobin 14.2 11.7 - 15.7 g/dL    Hematocrit 42.1 35.0 - 47.0 %     (H) 78 - 100 fl    MCH 35.6 (H) 26.5 - 33.0 pg    MCHC 33.7 31.5 - 36.5 g/dL    RDW 12.5 10.0 - 15.0 %    Platelet Count 150 150 - 450 10e9/L    Diff Method Automated Method     % Neutrophils 87.0 %    % Lymphocytes 5.6 %    % Monocytes 6.3 %    % Eosinophils 0.4 %    % Basophils 0.4 %    % Immature Granulocytes 0.3 %    Nucleated RBCs 0 0 /100    Absolute Neutrophil 6.0 1.6 - 8.3 10e9/L    Absolute Lymphocytes 0.4 (L) 0.8 - 5.3 10e9/L    Absolute Monocytes 0.4 0.0 - 1.3 10e9/L    Absolute Eosinophils 0.0 0.0 - 0.7 10e9/L    Absolute Basophils 0.0 0.0 - 0.2 10e9/L    Abs Immature Granulocytes 0.0 0 - 0.4 10e9/L    Absolute Nucleated RBC 0.0    Comprehensive metabolic panel     Status: Abnormal   Result Value Ref Range    Sodium 138 133 - 144 mmol/L    Potassium 4.2 3.4 - 5.3 mmol/L    Chloride 105 94 - 109 mmol/L    Carbon Dioxide 26 20 - 32 mmol/L    Anion Gap 7 3 - 14 mmol/L    Glucose 104 (H) 70 - 99 mg/dL    Urea Nitrogen 19 7 - 30 mg/dL    Creatinine  0.78 0.52 - 1.04 mg/dL    GFR Estimate 83 >60 mL/min/[1.73_m2]    GFR Estimate If Black >90 >60 mL/min/[1.73_m2]    Calcium 9.8 8.5 - 10.1 mg/dL    Bilirubin Total 0.8 0.2 - 1.3 mg/dL    Albumin 4.0 3.4 - 5.0 g/dL    Protein Total 7.6 6.8 - 8.8 g/dL    Alkaline Phosphatase 70 40 - 150 U/L    ALT 60 (H) 0 - 50 U/L    AST Canceled, Test credited 0 - 45 U/L   Nt probnp inpatient (BNP)     Status: None   Result Value Ref Range    N-Terminal Pro BNP Inpatient 485 0 - 900 pg/mL   Troponin I     Status: None   Result Value Ref Range    Troponin I ES <0.015 0.000 - 0.045 ug/L   TSH     Status: None   Result Value Ref Range    TSH 1.63 0.40 - 4.00 mU/L   Digoxin level     Status: None   Result Value Ref Range    Digoxin Level 0.8 0.5 - 2.0 ug/L   EKG 12-lead, complete     Status: None (Preliminary result)   Result Value Ref Range    Interpretation ECG Click View Image link to view waveform and result      XR Chest Port 1 View   Final Result   IMPRESSION:    Cardiomegaly with mild pulmonary venous congestion.      I have personally reviewed the examination and initial interpretation   and I agree with the findings.      ALINA HOWARD MD          Consults  Cardiology: Responded(EP) (12/12/19 2140)    Assessments & Plan (with Medical Decision Making)  59-year-old female known to myself history of cardiac issues chronically paroxysmal A. fib etc.  Patient presents with concerns of recurrent A. fib mild symptoms patient known to be in a sinus bradycardia here in the ER that may be related to her atenolol.  Her troponin BNP normal chest x-ray otherwise not significant changed normally heart rate is approximate 90.  Patient seen by EP cardiology here in the ER will admitted to cardiology service and hold the atenolol if recurrent ongoing problems consider pacemaker at this point patient hopefully will improve by holding off on the atenolol.  She agrees with plan stable           I have reviewed the nursing notes.    I have  reviewed the findings, diagnosis, plan and need for follow up with the patient.    Current Discharge Medication List          Final diagnoses:   Bradycardia     IEliceo, am serving as a trained medical scribe to document services personally performed by Ken Grimes MD, based on the provider's statements to me.      Ken CHAVEZ MD, was physically present and have reviewed and verified the accuracy of this note documented by Eliceo Chaparro.     12/12/2019   [facility] EMERGENCY DEPARTMENT    This note was created at least in part by the use of dragon voice dictation system. Inadvertent typographical errors may still exist.  Ken Grimes MD.         Ken Grimes MD  12/12/19 6887

## 2019-12-13 ENCOUNTER — APPOINTMENT (OUTPATIENT)
Dept: GENERAL RADIOLOGY | Facility: CLINIC | Age: 59
DRG: 244 | End: 2019-12-13
Attending: STUDENT IN AN ORGANIZED HEALTH CARE EDUCATION/TRAINING PROGRAM
Payer: COMMERCIAL

## 2019-12-13 ENCOUNTER — APPOINTMENT (OUTPATIENT)
Dept: PHYSICAL THERAPY | Facility: CLINIC | Age: 59
DRG: 244 | End: 2019-12-13
Attending: STUDENT IN AN ORGANIZED HEALTH CARE EDUCATION/TRAINING PROGRAM
Payer: COMMERCIAL

## 2019-12-13 PROBLEM — J96.01 ACUTE RESPIRATORY FAILURE WITH HYPOXIA (H): Status: RESOLVED | Noted: 2017-06-09 | Resolved: 2019-12-13

## 2019-12-13 PROBLEM — I48.0 PAROXYSMAL ATRIAL FIBRILLATION (H): Chronic | Status: ACTIVE | Noted: 2018-05-11

## 2019-12-13 PROBLEM — C83.30 DIFFUSE LARGE B CELL LYMPHOMA (H): Chronic | Status: ACTIVE | Noted: 2017-08-04

## 2019-12-13 PROBLEM — R00.1 BRADYCARDIA: Status: ACTIVE | Noted: 2019-12-12

## 2019-12-13 PROBLEM — A41.9 SEPSIS (H): Status: RESOLVED | Noted: 2017-06-19 | Resolved: 2019-12-13

## 2019-12-13 PROBLEM — I74.9 EMBOLISM AND THROMBOSIS OF UNSPECIFIED ARTERY (H): Chronic | Status: ACTIVE | Noted: 2019-10-29

## 2019-12-13 LAB
ANION GAP SERPL CALCULATED.3IONS-SCNC: 6 MMOL/L (ref 3–14)
BUN SERPL-MCNC: 22 MG/DL (ref 7–30)
CALCIUM SERPL-MCNC: 9.3 MG/DL (ref 8.5–10.1)
CHLORIDE SERPL-SCNC: 106 MMOL/L (ref 94–109)
CO2 SERPL-SCNC: 28 MMOL/L (ref 20–32)
CREAT SERPL-MCNC: 0.78 MG/DL (ref 0.52–1.04)
DIGOXIN SERPL-MCNC: 0.7 UG/L (ref 0.5–2)
ERYTHROCYTE [DISTWIDTH] IN BLOOD BY AUTOMATED COUNT: 12.5 % (ref 10–15)
GFR SERPL CREATININE-BSD FRML MDRD: 83 ML/MIN/{1.73_M2}
GLUCOSE SERPL-MCNC: 82 MG/DL (ref 70–99)
HCT VFR BLD AUTO: 41.4 % (ref 35–47)
HGB BLD-MCNC: 13.8 G/DL (ref 11.7–15.7)
INTERPRETATION ECG - MUSE: NORMAL
MCH RBC QN AUTO: 35.1 PG (ref 26.5–33)
MCHC RBC AUTO-ENTMCNC: 33.3 G/DL (ref 31.5–36.5)
MCV RBC AUTO: 105 FL (ref 78–100)
PLATELET # BLD AUTO: 138 10E9/L (ref 150–450)
POTASSIUM SERPL-SCNC: 3.7 MMOL/L (ref 3.4–5.3)
RBC # BLD AUTO: 3.93 10E12/L (ref 3.8–5.2)
SODIUM SERPL-SCNC: 140 MMOL/L (ref 133–144)
WBC # BLD AUTO: 5.1 10E9/L (ref 4–11)

## 2019-12-13 PROCEDURE — 80162 ASSAY OF DIGOXIN TOTAL: CPT | Performed by: PHYSICIAN ASSISTANT

## 2019-12-13 PROCEDURE — 25800030 ZZH RX IP 258 OP 636: Performed by: INTERNAL MEDICINE

## 2019-12-13 PROCEDURE — 02H63JZ INSERTION OF PACEMAKER LEAD INTO RIGHT ATRIUM, PERCUTANEOUS APPROACH: ICD-10-PCS | Performed by: INTERNAL MEDICINE

## 2019-12-13 PROCEDURE — C1894 INTRO/SHEATH, NON-LASER: HCPCS | Performed by: INTERNAL MEDICINE

## 2019-12-13 PROCEDURE — 27210794 ZZH OR GENERAL SUPPLY STERILE: Performed by: INTERNAL MEDICINE

## 2019-12-13 PROCEDURE — 97602 WOUND(S) CARE NON-SELECTIVE: CPT

## 2019-12-13 PROCEDURE — G0463 HOSPITAL OUTPT CLINIC VISIT: HCPCS | Mod: 25

## 2019-12-13 PROCEDURE — 25000125 ZZHC RX 250: Performed by: STUDENT IN AN ORGANIZED HEALTH CARE EDUCATION/TRAINING PROGRAM

## 2019-12-13 PROCEDURE — 25000128 H RX IP 250 OP 636: Performed by: INTERNAL MEDICINE

## 2019-12-13 PROCEDURE — 99153 MOD SED SAME PHYS/QHP EA: CPT | Performed by: INTERNAL MEDICINE

## 2019-12-13 PROCEDURE — 99152 MOD SED SAME PHYS/QHP 5/>YRS: CPT | Performed by: INTERNAL MEDICINE

## 2019-12-13 PROCEDURE — C1887 CATHETER, GUIDING: HCPCS | Performed by: INTERNAL MEDICINE

## 2019-12-13 PROCEDURE — 97161 PT EVAL LOW COMPLEX 20 MIN: CPT | Mod: GP | Performed by: PHYSICAL THERAPIST

## 2019-12-13 PROCEDURE — 25000131 ZZH RX MED GY IP 250 OP 636 PS 637: Performed by: STUDENT IN AN ORGANIZED HEALTH CARE EDUCATION/TRAINING PROGRAM

## 2019-12-13 PROCEDURE — 36415 COLL VENOUS BLD VENIPUNCTURE: CPT | Performed by: PHYSICIAN ASSISTANT

## 2019-12-13 PROCEDURE — 02HK3JZ INSERTION OF PACEMAKER LEAD INTO RIGHT VENTRICLE, PERCUTANEOUS APPROACH: ICD-10-PCS | Performed by: INTERNAL MEDICINE

## 2019-12-13 PROCEDURE — 97110 THERAPEUTIC EXERCISES: CPT | Mod: GP | Performed by: PHYSICAL THERAPIST

## 2019-12-13 PROCEDURE — 25000125 ZZHC RX 250: Performed by: INTERNAL MEDICINE

## 2019-12-13 PROCEDURE — 93610 INTRA-ATRIAL PACING: CPT | Mod: 26 | Performed by: INTERNAL MEDICINE

## 2019-12-13 PROCEDURE — 33208 INSRT HEART PM ATRIAL & VENT: CPT | Mod: KX | Performed by: INTERNAL MEDICINE

## 2019-12-13 PROCEDURE — 99232 SBSQ HOSP IP/OBS MODERATE 35: CPT | Performed by: INTERNAL MEDICINE

## 2019-12-13 PROCEDURE — 25000132 ZZH RX MED GY IP 250 OP 250 PS 637: Performed by: STUDENT IN AN ORGANIZED HEALTH CARE EDUCATION/TRAINING PROGRAM

## 2019-12-13 PROCEDURE — 21400000 ZZH R&B CCU UMMC

## 2019-12-13 PROCEDURE — 85027 COMPLETE CBC AUTOMATED: CPT | Performed by: PHYSICIAN ASSISTANT

## 2019-12-13 PROCEDURE — C1898 LEAD, PMKR, OTHER THAN TRANS: HCPCS | Performed by: INTERNAL MEDICINE

## 2019-12-13 PROCEDURE — 40000986 XR CHEST PORT 1 VW

## 2019-12-13 PROCEDURE — 80048 BASIC METABOLIC PNL TOTAL CA: CPT | Performed by: PHYSICIAN ASSISTANT

## 2019-12-13 PROCEDURE — 93005 ELECTROCARDIOGRAM TRACING: CPT

## 2019-12-13 PROCEDURE — C1785 PMKR, DUAL, RATE-RESP: HCPCS | Performed by: INTERNAL MEDICINE

## 2019-12-13 PROCEDURE — 97530 THERAPEUTIC ACTIVITIES: CPT | Mod: GP | Performed by: PHYSICAL THERAPIST

## 2019-12-13 PROCEDURE — 0JH606Z INSERTION OF PACEMAKER, DUAL CHAMBER INTO CHEST SUBCUTANEOUS TISSUE AND FASCIA, OPEN APPROACH: ICD-10-PCS | Performed by: INTERNAL MEDICINE

## 2019-12-13 PROCEDURE — 93010 ELECTROCARDIOGRAM REPORT: CPT | Mod: 59 | Performed by: INTERNAL MEDICINE

## 2019-12-13 DEVICE — IMP LEAD PACING BIPOLAR CAPSUREFIX NOVUS 52CM 5076-52: Type: IMPLANTABLE DEVICE | Status: FUNCTIONAL

## 2019-12-13 DEVICE — PACEMAKER AZURE MRI XT DR: Type: IMPLANTABLE DEVICE | Status: FUNCTIONAL

## 2019-12-13 DEVICE — LEAD PACEMAKER SELECTSECURE 69CM MD  383069: Type: IMPLANTABLE DEVICE | Status: FUNCTIONAL

## 2019-12-13 RX ORDER — FENTANYL CITRATE 50 UG/ML
25-50 INJECTION, SOLUTION INTRAMUSCULAR; INTRAVENOUS
Status: ACTIVE | OUTPATIENT
Start: 2019-12-13 | End: 2019-12-14

## 2019-12-13 RX ORDER — FLUMAZENIL 0.1 MG/ML
0.2 INJECTION, SOLUTION INTRAVENOUS
Status: ACTIVE | OUTPATIENT
Start: 2019-12-13 | End: 2019-12-14

## 2019-12-13 RX ORDER — CLINDAMYCIN PHOSPHATE 600 MG/50ML
600 INJECTION, SOLUTION INTRAVENOUS EVERY 8 HOURS
Status: COMPLETED | OUTPATIENT
Start: 2019-12-13 | End: 2019-12-14

## 2019-12-13 RX ORDER — LIDOCAINE 40 MG/G
CREAM TOPICAL
Status: DISCONTINUED | OUTPATIENT
Start: 2019-12-13 | End: 2019-12-13 | Stop reason: HOSPADM

## 2019-12-13 RX ORDER — CLINDAMYCIN PHOSPHATE 900 MG/50ML
900 INJECTION, SOLUTION INTRAVENOUS
Status: COMPLETED | OUTPATIENT
Start: 2019-12-13 | End: 2019-12-13

## 2019-12-13 RX ORDER — ATROPINE SULFATE 0.1 MG/ML
0.5 INJECTION INTRAVENOUS EVERY 5 MIN PRN
Status: ACTIVE | OUTPATIENT
Start: 2019-12-13 | End: 2019-12-14

## 2019-12-13 RX ORDER — ONDANSETRON 2 MG/ML
INJECTION INTRAMUSCULAR; INTRAVENOUS
Status: DISCONTINUED | OUTPATIENT
Start: 2019-12-13 | End: 2019-12-13 | Stop reason: HOSPADM

## 2019-12-13 RX ORDER — FENTANYL CITRATE 50 UG/ML
INJECTION, SOLUTION INTRAMUSCULAR; INTRAVENOUS
Status: DISCONTINUED | OUTPATIENT
Start: 2019-12-13 | End: 2019-12-13 | Stop reason: HOSPADM

## 2019-12-13 RX ORDER — NALOXONE HYDROCHLORIDE 0.4 MG/ML
.2-.4 INJECTION, SOLUTION INTRAMUSCULAR; INTRAVENOUS; SUBCUTANEOUS
Status: DISCONTINUED | OUTPATIENT
Start: 2019-12-13 | End: 2019-12-13

## 2019-12-13 RX ORDER — CLINDAMYCIN HCL 300 MG
300 CAPSULE ORAL 4 TIMES DAILY
Qty: 20 CAPSULE | Refills: 0 | Status: SHIPPED | OUTPATIENT
Start: 2019-12-13 | End: 2019-12-14

## 2019-12-13 RX ORDER — OXYCODONE AND ACETAMINOPHEN 5; 325 MG/1; MG/1
1 TABLET ORAL EVERY 4 HOURS PRN
Status: DISCONTINUED | OUTPATIENT
Start: 2019-12-13 | End: 2019-12-14 | Stop reason: HOSPADM

## 2019-12-13 RX ORDER — NALOXONE HYDROCHLORIDE 0.4 MG/ML
.1-.4 INJECTION, SOLUTION INTRAMUSCULAR; INTRAVENOUS; SUBCUTANEOUS
Status: DISCONTINUED | OUTPATIENT
Start: 2019-12-13 | End: 2019-12-14 | Stop reason: HOSPADM

## 2019-12-13 RX ORDER — LIDOCAINE HYDROCHLORIDE 20 MG/ML
INJECTION, SOLUTION INFILTRATION; PERINEURAL
Status: DISCONTINUED | OUTPATIENT
Start: 2019-12-13 | End: 2019-12-13 | Stop reason: HOSPADM

## 2019-12-13 RX ADMIN — SPIRONOLACTONE 25 MG: 25 TABLET ORAL at 09:20

## 2019-12-13 RX ADMIN — ACETAMINOPHEN 650 MG: 325 TABLET, FILM COATED ORAL at 22:30

## 2019-12-13 RX ADMIN — GABAPENTIN 200 MG: 100 CAPSULE ORAL at 17:14

## 2019-12-13 RX ADMIN — MULTIPLE VITAMINS W/ MINERALS TAB 1 TABLET: TAB at 09:20

## 2019-12-13 RX ADMIN — FUROSEMIDE 20 MG: 20 TABLET ORAL at 09:20

## 2019-12-13 RX ADMIN — Medication 2 TABLET: at 09:19

## 2019-12-13 RX ADMIN — PREDNISONE 5 MG: 5 TABLET ORAL at 09:20

## 2019-12-13 RX ADMIN — HYDROXYCHLOROQUINE SULFATE 200 MG: 200 TABLET, FILM COATED ORAL at 09:20

## 2019-12-13 RX ADMIN — GABAPENTIN 200 MG: 100 CAPSULE ORAL at 09:20

## 2019-12-13 RX ADMIN — TOLTERODINE 4 MG: 4 CAPSULE, EXTENDED RELEASE ORAL at 09:21

## 2019-12-13 RX ADMIN — GABAPENTIN 200 MG: 100 CAPSULE ORAL at 22:27

## 2019-12-13 RX ADMIN — CLINDAMYCIN IN 5 PERCENT DEXTROSE 600 MG: 12 INJECTION, SOLUTION INTRAVENOUS at 22:37

## 2019-12-13 ASSESSMENT — PAIN DESCRIPTION - DESCRIPTORS
DESCRIPTORS: ACHING
DESCRIPTORS: HEADACHE

## 2019-12-13 ASSESSMENT — ACTIVITIES OF DAILY LIVING (ADL)
ADLS_ACUITY_SCORE: 13

## 2019-12-13 ASSESSMENT — MIFFLIN-ST. JEOR: SCORE: 1072.57

## 2019-12-13 NOTE — PROGRESS NOTES
[facility]   Cardiology Progress      Interval History: Continues to have headache, posterior larynx fullness, nausea.  Anxious for pacemaker.  Otherwise denies vomiting, lightheadedness, chest pain, shortness of breath, abdominal pain, and new LE pain/swelling.    Changes Today:  - EP consult  - wound consult for right foot wound  - NPO pending EP discussion re: pacemaker    Physical Exam:  Temp:  [97.7  F (36.5  C)-98.7  F (37.1  C)] 97.7  F (36.5  C)  Pulse:  [45-54] 46  Heart Rate:  [34-65] 48  Resp:  [8-33] 16  BP: (107-145)/(61-96) 123/74  SpO2:  [82 %-99 %] 99 %    GEN: NAD  Pulm: CTAB, no appreciable rales/rhonchi  Cardiac: Normal S1 and S2. Regular rhythm.  + bradycardia. + 3/6 crescendo systolic murmur. JVP not appreciably elevated.  Vascular: extremities  GI: soft, non distended  Neuro:  Alert and oriented x4.    Medications:    apixaban ANTICOAGULANT  5 mg Oral BID     calcium carbonate 600 mg-vitamin D 400 units  2 tablet Oral QAM     furosemide  20 mg Oral Daily     gabapentin  200 mg Oral TID     hydroxychloroquine  200 mg Oral Daily     multivitamin w/minerals  1 tablet Oral Daily     predniSONE  5 mg Oral Daily     sodium chloride (PF)  3 mL Intracatheter Q8H     sodium chloride (PF)  3 mL Intracatheter Q8H     spironolactone  25 mg Oral Daily     tolterodine ER  4 mg Oral Daily       - MEDICATION INSTRUCTIONS -         Labs:   CMP  Recent Labs   Lab 12/12/19  1153      POTASSIUM 4.2   CHLORIDE 105   CO2 26   ANIONGAP 7   *   BUN 19   CR 0.78   GFRESTIMATED 83   GFRESTBLACK >90   VIKTORIYA 9.8   PROTTOTAL 7.6   ALBUMIN 4.0   BILITOTAL 0.8   ALKPHOS 70   AST Canceled, Test credited   ALT 60*     CBC  Recent Labs   Lab 12/12/19  1153   WBC 6.8   RBC 3.99   HGB 14.2   HCT 42.1   *   MCH 35.6*   MCHC 33.7   RDW 12.5        ASSESSMENT/PLAN:  Amelia Michel is a 59 year old female with PMH of VSD s/p repair (1969), RA, HTN, insomnia, VF cardiac  arrest, DLBCL, exudative pericardial effusion, and atrial tachyarrhythmias who presents with palpitations. Found to have bradycardia     #Symptomatic bradycardia  #H/o atrial tachyarrhythmias  Patient presenting with nausea, headache, and feeling of palpitations in setting of bradycardia. Patient with long-standing history of atrial tachyarrhythmias currently on atenolol and digoxin. Follows with Dr. Eaton. Has been compliant with meds, no changes recently. No other illnesses. Found to have pulses in the 40s with stable BP. No other particular complaints. Seen by Dr. Eaton in ED with plan for holding of meds overnight with plan for repeat evaluation in AM. Does have h/o arrest (?VF) in setting of bradycardia, so will need to monitor closely. May need pacemaker during course of admission. Telemetry overnight showed predominant junctional bradycardia with rates of 40-55.  -continue to hold digoxin, atenolol  -continue to keep pacer pads in place  -EP consulted; appreciated recommendations. NPO in case of pacemaker today  -continue apixaban      #HTN  -continue lasix, aldactone; other meds as above     #RA  -continue HCQ, prednisone, gabapentin      Anticipated Disposition: pending EP consultation, possible pacemaker    Patient seen and discussed with Dr. Sandy Michele, who agrees with above plan.    Milly Ramírez PA-C  Cardiology - [facility]      This is a shared visit. Patient seen and examined and management plan personally reviewed with the patient. I agree with the note above by ELIZABETH/PA, Maria Esther Cutler, Andrei Marie, Milly Ramírez, Serina Kwon, Gisel Moralez. I reviewed today's vital signs and medications. I have reviewed and discussed with the advanced practice provider their physical examination, assessment, and plan.     My key history/exam findings:   Symptomatic bradycardia    Key management decisions made by me: Permanent pacemaker implantation    Sandy Michele MD, MS  EP/Cardiology  Staff

## 2019-12-13 NOTE — PROGRESS NOTES
[facility] Nurse Inpatient Wound Assessment   Reason for consultation: right ankle wound     Assessment  Right lateral malleolus wound due to Friction in orthotic, present on admission  Status: initial assessment    Treatment Plan  Right lateral malleolus wound: Every 3 days: remove dressing and wash wound with saline and gauze. Pat dry. Prep periwound skin with no sting. Cut a piece of Comfeel (order #[id number]) 1 cm larger that wound and place over wound. Leave in place for 3 days. This dressing is used to autolytically debride the wound base- there may be tan/brown drainage under the dressing: This Is To Be Expected and is NOT a sign of infection.    Orders Written  WOC Nurse follow-up plan:weekly  Nursing to notify the Provider(s) and re-consult the WOC Nurse if wound(s) deteriorates or new skin concern.    Patient History  According to provider note(s):  Per CRISTA Penn on 12/13/19: Amelia Michel is a 59 year old female with PMH of VSD s/p repair (1969), RA, HTN, insomnia, VF cardiac arrest, DLBCL, exudative pericardial effusion, and atrial tachyarrhythmias who presents with palpitations. Found to have bradycardia    Objective Data  Containment of urine/stool: Continent of bladder and Continent of bowel    Active Diet Order  Orders Placed This Encounter      NPO for Medical/Clinical Reasons Except for: Meds      Output:   I/O last 3 completed shifts:  In: 360 [P.O.:360]  Out: 1600 [Urine:1600]    Risk Assessment:   Sensory Perception: 4-->no impairment  Moisture: 4-->rarely moist  Activity: 3-->walks occasionally  Mobility: 3-->slightly limited  Nutrition: 3-->adequate  Friction and Shear: 3-->no apparent problem  Jay Score: 20                          Labs:   Recent Labs   Lab 12/13/19  0811 12/12/19  1153   ALBUMIN  --  4.0   HGB 13.8 14.2   WBC 5.1 6.8       Physical Exam  Skin inspection: focused bilateral feet    Wound Location:  Right lateral malleolus        Date of last photo 12/13/19  Wound History:  Present on admission. Patient wears orthotic and has had wound for appx 3 months. Orthotist has adjusted brace but wound has not healed  Measurements (length x width x depth, in cm) 1 cm   x 1 cm   x  0 cm cm   Wound Base: 100 % thick brown scab  Tunneling N/A  Undermining N/A  Palpation of the wound bed: firm   Periwound skin: intact  Periwound Color: pink  Periwound Temperature: cool  Drainage:, none  Description of drainage: none  Odor: none  Pain: denies     Interventions  Current support surface: Standard  Atmos Air mattress  Current off-loading measures: Pillows  Visual inspection and assessment completed completed by [facility]  Wound Care: done per plan of care  Supplies: ordered: Comfeel  Education provided: plan of care and wound progress  Discussed plan of care with Patient and Nurse    Chelo Ndiaye RN, CWOCN

## 2019-12-13 NOTE — CONSULTS
Electrophysiology Consultation Note   EP Attending: .   Reason for consultation: Bradycardia .   Provider requesting consultation: VICTORINA Kurtz MD.  Date of Service: 12/12/2019      HPI:   Ms. Michel is a 58 year old female who has a past medical history significant for VSD s/p repair 1969, RA, HTN, insomnia, atrial tachyarrhythmias, cardiac arrest, DLBCL, and exudative pericardial effusion.      She was admitted from 5/15/17-5/23/17 with atrial tachyarrhythmias (SVT, AF, AFL) with symptoms of palpitations, fatigue, and nausea. An echo showed LVEF 40-45%, mildly reduced RVEF, moderate biatrial enlargement, mild mitral regurgitation, and moderate tricuspid regurgitation. . She was started on Eliquis and underwent a BRE/DCCV on 5/17/17 c/b hypotension and recurrent arrhythmia.She was started on Sotalol and chemically cardioverted. However, she had nausea and thought it was from the Sotalol so this was stopped. She reverted back to AF/AFL and a rate control strategy was adopted which was difficult given symptoms so she started amiodarone. CMRI  showed LVEF 55%, mildly dilated RV with focal area of dyskinesis in RVOT, severe bi-atrial enlargement, severe TR, mild/moderate MR, and DE at RV septal insertion site. RFA was discussed but was defered as patient had a UTI at the time with ESBL.     She was readmitted on 6/19/17-8/4/17 after suffering an out of hospital cardiac arrest.  Upon EMS arrival, she was in VF. She was externally defibrillated and had ROSC in the field. She was intubated and brought to [facility] found to be in escape rhythm 43 bpm. She was taken emergently to cath lab where coronary angiogram showed normal coronary arteries. Temporary pacemaker was placed in RA given sinus arrest. She was also placed on therapeutic hypothermia. She had been having nausea, diarrhea, and intermittent vomiting over the last week and generally had not been feeling well over the last month and also had saddle anesthesia  "with lower extremity numbness that had been evaluated by neuro as an outpatient.  Ultimately found to have DLBCL started on chemo cycles. 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     Her DLBCL was treated with R-EPOCH for one cycle while in the hospital complicated by cytopenias followed by 5 cycles of R-CHOP finished the cycles in December of 2017 currently on surveillance scans. She had a pericardial effusion for which she is on colchicine which was discontinued at the end of 6/2018.      She has then followed intermittently with EP in clinic. She had some recurrent AF in 4/2018 requiring ED visit with DCCV with recurrence and another DCCV. We restarted digoxin 4/4/18 after which she had one episode of AF s/p DCCV and has since maintained sinus rhythm and reported good symptomatic control. She was seen in 5/2018 and reported having a feeling of her heart \"rolling\" daily lasting about about 10 sec at the most. The last time she required DCCV was in 4/12/2018. Her cMRI had shown some organization of her [ericardial effusion) and she has been initiated on colchicine. Chest CT in 9/2018 showed no evidence of lymphoma recurrence and improved pericardial effusion. Last EP follow up was in 2/2019 and she was doing well without issues.    She then presented today with 1 day history palpitations, headaches, and nausea. She also endorses some dizziness when walking but not at rest. She was found to be bradycardic with intermittent junctional rhythm into 30's. Electrolytes and renal function stable. Current cardiac medications include: Elqiuis, Atenolol, Digoxin, Lasix, and Spironolactone.     Past Medical History:   Past Medical History:   Diagnosis Date     Arthritis      Cardiac arrest (H)      History of chemotherapy      Hypertension      Neuropathy      Past Surgical History:   Past Surgical History: "   Procedure Laterality Date     ANESTHESIA CARDIOVERSION N/A 5/17/2017    Procedure: ANESTHESIA CARDIOVERSION;  ANESTHESIA CARDIOVERSION;  Surgeon: GENERIC ANESTHESIA PROVIDER;  Location: [facility]     ANESTHESIA CARDIOVERSION  3/12/2018    Procedure: ANESTHESIA CARDIOVERSION;;  Surgeon: GENERIC ANESTHESIA PROVIDER;  Location: [facility]     ANESTHESIA CARDIOVERSION N/A 3/23/2018    Procedure: ANESTHESIA CARDIOVERSION;  Anesthesia Cardioversion ;  Surgeon: GENERIC ANESTHESIA PROVIDER;  Location: [facility]     ANESTHESIA CARDIOVERSION N/A 4/12/2018    Procedure: ANESTHESIA CARDIOVERSION;  Cardioversion;  Surgeon: GENERIC ANESTHESIA PROVIDER;  Location: [facility] OR     ARTHRODESIS WRIST  2000    Right wrist     BONE MARROW ASPIRATE &BIOPSY  7/12/2017          COLONOSCOPY N/A 11/19/2019    Procedure: Colonoscopy, With Polypectomy And Biopsy;  Surgeon: Pj Vasques MD;  Location: [facility]     EXCISE LESION UPPER EXTREMITY Left 5/22/2018    Procedure: EXCISE LESION UPPER EXTREMITY;  Left Arm Wound Excision And Closure, Possible Submuscular Transposition of Ulnar Nerve  (Choice Anesthesia);  Surgeon: Kevin Sheldon MD;  Location: [facility] OR     FOOT SURGERY      4 left and 2 right     RELEASE CARPAL TUNNEL BILATERAL       REPAIR VENTRICULAR SEPTAL DEFECT  1969     TRANSPOSITION ULNAR NERVE (ELBOW) Left 5/22/2018    Procedure: TRANSPOSITION ULNAR NERVE (ELBOW);;  Surgeon: Kevin Sheldon MD;  Location: [facility]     Allergies: Per MAR     Allergies   Allergen Reactions     Amiodarone Other (See Comments) and Difficulty breathing     Lethargic and had trouble breathing- occurred in 2017     Blood Transfusion Related (Informational Only) Hives     Hives with platelets. Give benadryl premedication.     Metoprolol Other (See Comments)     Pt and  report that metoprolol does not work for her and also reports feeling unwell with this medication. She has been able to tolerate atenolol, which as worked in controlling her HR.      No Clinical Screening  - See Comments      Penicillin Allergy Skin Test not performed, see antimicrobial management team progress note 7/5/17.       Penicillins      Tape [Adhesive Tape] Rash     Medications:   Per MAR current outpatient cardiovascular medications include:   Medications Prior to Admission   Medication Sig Dispense Refill Last Dose     acetaminophen (TYLENOL) 500 MG tablet Take 500 mg by mouth every 6 hours as needed for mild pain   12/11/2019 at 1400     alendronate (FOSAMAX) 70 MG tablet Take 70 mg by mouth every 7 days On Monday 12/9/2019 at 0800     apixaban ANTICOAGULANT (ELIQUIS) 5 MG tablet Take 1 tablet (5 mg) by mouth 2 times daily 180 tablet 3 12/12/2019 at 0800     atenolol (TENORMIN) 25 MG tablet Take 1 tablet (25 mg) by mouth 2 times daily 180 tablet 3 12/12/2019 at 0800     calcium carbonate 600 mg-vitamin D 400 units (CALTRATE) 600-400 MG-UNIT per tablet Take 2 tablets by mouth daily   12/11/2019 at 1400     digoxin (LANOXIN) 125 MCG tablet Take 1 tablet (125 mcg) by mouth daily 90 tablet 3 12/11/2019 at 0800     furosemide (LASIX) 20 MG tablet Take 1 tablet (20 mg) by mouth daily 90 tablet 3 12/12/2019 at 0800     gabapentin (NEURONTIN) 100 MG capsule Take 200 mg by mouth 3 times daily   12/12/2019 at 0800     hydroxychloroquine (PLAQUENIL) 200 MG tablet Take 1 tablet (200 mg) by mouth daily 90 tablet 3 12/12/2019 at 0800     magnesium 250 MG tablet Take 1 tablet by mouth At Bedtime   12/11/2019 at 2200     multivitamin, therapeutic with minerals (THERA-VIT-M) TABS tablet Take 1 tablet by mouth daily 30 each 0 12/11/2019 at 1400     predniSONE (DELTASONE) 5 MG tablet Take 1 tablet (5 mg) by mouth daily 90 tablet 1 12/12/2019 at 0800     spironolactone (ALDACTONE) 25 MG tablet Take 25 mg by mouth daily   12/12/2019 at 0800     tolterodine ER (DETROL LA) 4 MG 24 hr capsule Take 1 capsule (4 mg) by mouth daily 90 capsule 3 12/12/2019 at 0800     No current outpatient medications on file.     Current  "Facility-Administered Medications   Medication Dose Route Frequency     apixaban ANTICOAGULANT  5 mg Oral BID     [START ON 12/13/2019] calcium carbonate 600 mg-vitamin D 400 units  2 tablet Oral QAM     furosemide  20 mg Oral Daily     gabapentin  200 mg Oral TID     [START ON 12/13/2019] hydroxychloroquine  200 mg Oral Daily     [START ON 12/13/2019] multivitamin w/minerals  1 tablet Oral Daily     [START ON 12/13/2019] predniSONE  5 mg Oral Daily     sodium chloride (PF)  3 mL Intracatheter Q8H     sodium chloride (PF)  3 mL Intracatheter Q8H     [START ON 12/13/2019] spironolactone  25 mg Oral Daily     tolterodine ER  4 mg Oral Daily     Family History:   Family History   Problem Relation Age of Onset     Breast Cancer Mother      Hypertension Mother      Alzheimer Disease Mother      Cerebrovascular Disease Mother      Hypertension Father      Cerebrovascular Disease Father      Atrial fibrillation Father      Lymphoma Father      Prostate Cancer Father      Diabetes Paternal Grandmother      Alzheimer Disease Maternal Grandmother         likely     Arthritis Maternal Grandfather      Pneumonia Maternal Grandfather      Social History:   Social History     Tobacco Use     Smoking status: Never Smoker     Smokeless tobacco: Never Used   Substance Use Topics     Alcohol use: Yes     Comment: none       ROS:   A comprehensive 10 point ROS was negative other than as mentioned in HPI.    Physical Examination:   VITALS: BP (!) 145/96 (BP Location: Right arm)   Pulse 50   Temp 97.7  F (36.5  C) (Oral)   Resp 16   Ht 1.473 m (4' 10\")   Wt 60.8 kg (134 lb 1.6 oz)   SpO2 96%   BMI 28.03 kg/m    GENERAL APPEARANCE: AxO, NAD   HEENT: NCAT, EOMI, MMM. PERRLA.   NECK: Supple. No JVD or bruit. Good carotid upstroke.   CHEST: CTAB   CARDIOVASCULAR: S1S2, Reg, No m/r/g.   ABDOMEN: BS+, soft, NT, ND.   EXTREMITIES: No pedal edema. Distal pulses intact.   NEURO: Grossly nonfocal.   PSYCH: Normal affect.  SKIN: Warm and " dry.   Data:   Labs:  BMP  Recent Labs   Lab 19  1153      POTASSIUM 4.2   CHLORIDE 105   VIKTORIYA 9.8   CO2 26   BUN 19   CR 0.78   *     CBC  Recent Labs   Lab 19  1153   WBC 6.8   RBC 3.99   HGB 14.2   HCT 42.1   *   MCH 35.6*   MCHC 33.7   RDW 12.5        INRNo lab results found in last 7 days.  No results found for: CKTOTAL, CKMB, TROPN  Cholesterol (mg/dL)   Date Value   10/24/2019 121   2018 96   2016 106   03/15/2016 84 (A)     Cholesterol/HDL Ratio (no units)   Date Value   2011 3.0   2011 3.0     HDL Cholesterol (mg/dL)   Date Value   10/24/2019 50   2018 35 (L)   2016 10 (L)   03/15/2016 2     LDL Cholesterol Calculated (mg/dL)   Date Value   10/24/2019 54   2018 37   2016 46   03/15/2016 25     EK/2018 CMRI:  1. The LV is normal in cavity size and wall thickness. The global systolic function is normal. The LVEF is  54%. There are no regional wall motion abnormalities.     2. The RV is mildly dilated in cavity size. The global systolic function is normal. The RVEF is 49%.      3. Both atria are severely enlarged.     4. There is at least moderate, possibly severe tricuspid regurgitation.      5. Late gadolinium enhancement imaging shows hyperenhancement in the RV insertion site consistent with RV  pressure/volume overload.      6. There is a loculated pericardial effusion along the basal lateral and basal to mid inferior LV walls,  and along the inferior RV wall. it appears exudative in nature, and is beginning to organize. There is  diffuse pericardial enhancement.     7. There is no intracardiac thrombus or mass.     8. There is a small right pleural effusion.     CONCLUSIONS:  Loculated exudative pericardial effusion that is beginning to organize, with diffuse  pericardial enhancement consistent with ongoing inflammation. Normal LV fxn, LVEF 54% and RVEF 49%. At  least moderate, possibly severe tricuspid  regurgitation. Compared to MRI from 03/2018, effusion is largely  unchanged in size (maybe a bit smaller) but is starting to organize. No change in pericardial enhancement.  Assessment:   Ms. Michel is a 58 year old female who has a past medical history significant for VSD s/p repair 1969, RA, HTN, insomnia, atrial tachyarrhythmias, cardiac arrest, DLBCL, and exudative pericardial effusion. She was admitted from 5/15/17-5/23/17 with atrial tachyarrhythmias (SVT, AF, AFL) with symptoms of palpitations, fatigue, and nausea. An echo showed LVEF 40-45%, mildly reduced RVEF, moderate biatrial enlargement, mild mitral regurgitation, and moderate tricuspid regurgitation. . She was started on Eliquis and underwent a BRE/DCCV on 5/17/17 c/b hypotension and recurrent arrhythmia.She was started on Sotalol and chemically cardioverted. However, she had nausea and thought it was from the Sotalol so this was stopped. She reverted back to AF/AFL and a rate control strategy was adopted which was difficult given symptoms so she started amiodarone. CMRI  showed LVEF 55%, mildly dilated RV with focal area of dyskinesis in RVOT, severe bi-atrial enlargement, severe TR, mild/moderate MR, and DE at RV septal insertion site. RFA was discussed but was defered as patient had a UTI at the time with ESBL. She was readmitted on 6/19/17-8/4/17 after suffering an out of hospital cardiac arrest.  Upon EMS arrival, she was in VF. She was externally defibrillated and had ROSC in the field. She was intubated and brought to [facility] found to be in escape rhythm 43 bpm. She was taken emergently to cath lab where coronary angiogram showed normal coronary arteries. Temporary pacemaker was placed in RA given sinus arrest. She was also placed on therapeutic hypothermia. She had been having nausea, diarrhea, and intermittent vomiting over the last week and generally had not been feeling well over the last month and also had saddle anesthesia with lower extremity  "numbness that had been evaluated by neuro as an outpatient.  Ultimately found to have DLBCL started on chemo cycles. 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  Her DLBCL was treated with R-EPOCH for one cycle while in the hospital complicated by cytopenias followed by 5 cycles of R-CHOP finished the cycles in December of 2017 currently on surveillance scans. She had a pericardial effusion for which she is on colchicine which was discontinued at the end of 6/2018. She has then followed intermittently with EP in clinic. She had some recurrent AF in 4/2018 requiring ED visit with DCCV with recurrence and another DCCV. We restarted digoxin 4/4/18 after which she had one episode of AF s/p DCCV and has since maintained sinus rhythm and reported good symptomatic control. She was seen in 5/2018 and reported having a feeling of her heart \"rolling\" daily lasting about about 10 sec at the most. The last time she required DCCV was in 4/12/2018. Her cMRI had shown some organization of her [ericardial effusion) and she has been initiated on colchicine. Chest CT in 9/2018 showed no evidence of lymphoma recurrence and improved pericardial effusion. Last EP follow up was in 2/2019 and she was doing well without issues.    She then presented today with 1 day history palpitations, headaches, and nausea. She also endorses some dizziness when walking but not at rest. She was found to be bradycardic with intermittent junctional rhythm into 30's. Electrolytes and renal function stable. Current cardiac medications include: Elqiuis, Atenolol, Digoxin, Lasix, and Spironolactone.   EP Recommendations:  Symptomatic Intermittent Junctional Bradycardia:   - Hold atenolol and digoxin  - Obtain digoxin level  - Admit to Cards I for close monitoring  - Likely will need PPM as has history of bradycardic induced arrest before and very " important to avoid significant bradycardia.   We will continue to follow along with you.   The patient states understanding and is agreeable with plan.   Thank you for allowing us to participate in the care of this patient.     The patient was discussed w/ Dr. Eaton.  The above note reflects our joint plan.    ELIZABETH Verde CNP  Electrophysiology Consult Service  Pager: [phone]    EP STAFF NOTE  I have seen and examined the patient as part of a shared visit with GERA Verde NP.  I agree with the note above. I reviewed today's vital signs and medications. I have reviewed and discussed with the advanced practice provider their physical examination, assessment, and plan   Briefly, bradycardia  My key history/exam findings are: shelton.   The key management decisions made by me: stop atenolol, consider vasodilator for HTN, low threshold for PPM because of history of shelton induced VF whiel on amio..    Juan Eaton MD [facility]  Cardiology - Electrophysiology

## 2019-12-13 NOTE — PLAN OF CARE
PT 6C: PT evaluation completed, treatment initiated.   Discharge Planner PT   Patient plan for discharge: home with assist from   Current status: pt awaiting pacemaker placement today, educated on pacer precautions and implications for mobility. Pt with R AFO and subsequent lateral malleolus wound, now appropriately bandaged by wound care and AFO altered by orthotics to reduce skin breakdown. Pt ambulates 150ft with 4WW and SBA, reports improved fit of brace.   Barriers to return to prior living situation: medical status, pt alone during the day   Recommendations for discharge: home with assist as needed and OP CR  Rationale for recommendations: pt moving well, would benefit from OP CR to progress activity tolerance after pacemaker placement.       Entered by: Shyann Casas 12/13/2019 2:09 PM

## 2019-12-13 NOTE — PROGRESS NOTES
EP Progress Note    Patient seen on 6c.  Telemetry still shows bradycardia, HR 30s-50s.  Appears between junctional vs sinus bradycardia with heart block.  Certainly has sinus node dysfunction with narrow QRS.  Patient discussed with attendings Dr. Eaton and Dr. Trent.  Will plan on permanent pacing.  Patient consented.  NPO and last dose of eliquis last night.      Will plan on initially placing atrial lead and pacing to test AV node and distal conduction system.  If no wenckebach or other higher degree blocks seen at her age predicted maximal heart rate, will plan on single lead atrial system.  If blocks are visualized, then dual lead system.    Uriel Benavidez MD PGY6  Cardiology Fellow

## 2019-12-13 NOTE — PROGRESS NOTES
Admission          12/12/2019 5:00 PM  --------------------------------------------------------  Diagnosis: symptomatic bradycardia  Admitted from: VICTORINA  Report given from: RN  Via: wheelchair  Accompanied by: , Wolf  Family Aware of Admission: Yes  Belongings: Placed in closet; valuables sent home with family  Admission Profile: complete  Teaching: orientation to unit, call don't fall, use of call light, meal times, when to call for the RN (angina/sob/dizzyness, etc.), and enforced importance of safety   Access: PIV  Telemetry: Placed on patient; sinus shelton/junctonal/afib 30's-55  Ht./Wt.: complete  Skin assessment: Done with Adilene SANTORO RN    Temp:  [98  F (36.7  C)-98.7  F (37.1  C)] 98  F (36.7  C)  Pulse:  [45-50] 50  Heart Rate:  [34-65] 50  Resp:  [8-33] 14  BP: (115-142)/(68-82) 138/81  SpO2:  [82 %-98 %] 93 %

## 2019-12-13 NOTE — PROGRESS NOTES
12/13/19 1315   Quick Adds   Type of Visit Initial PT Evaluation   Living Environment   Lives With spouse   Living Arrangements house   Home Accessibility stairs to enter home   Number of Stairs, Main Entrance 2   Stair Railings, Main Entrance railing on right side (ascending)   Transportation Anticipated family or friend will provide   Living Environment Comment pt lives with her  who works during the day, available in the AM and night to assist as needed    Self-Care   Usual Activity Tolerance moderate   Current Activity Tolerance moderate   Regular Exercise No   Equipment Currently Used at Home walker, rolling;orthotic device   Activity/Exercise/Self-Care Comment pt uses an AFO on  her RLE, uses a 4WW for all mobility outside the home and also sometimes inside    Functional Level Prior   Ambulation 1-->assistive equipment   Transferring 1-->assistive equipment   Toileting 0-->independent   Bathing 0-->independent   Fall history within last six months no   Which of the above functional risks had a recent onset or change? none   Prior Functional Level Comment Pt reports last fall was in March, generally IND although somtimes her  needs to help her get her socks and shoes on    General Information   Onset of Illness/Injury or Date of Surgery - Date 12/12/19   Referring Physician Daphne Ralph MD   Patient/Family Goals Statement to go home and get stronger, return to exercise   Pertinent History of Current Problem (include personal factors and/or comorbidities that impact the POC) 59 year old female with PMH of VSD s/p repair (1969), RA, HTN, insomnia, VF cardiac arrest, DLBCL, exudative pericardial effusion, and atrial tachyarrhythmias who presents with palpitations. Found to have bradycardia   Precautions/Limitations no known precautions/limitations   Heart Disease Risk Factors Medical history;High blood pressure   General Observations pt up moving in room IND, agreeable to PT    Cognitive  "Status Examination   Orientation orientation to person, place and time   Level of Consciousness alert   Follows Commands and Answers Questions 100% of the time   Personal Safety and Judgment intact   Memory intact   Pain Assessment   Patient Currently in Pain No   Integumentary/Edema   Integumentary/Edema Comments per chart pt with R lateral malleolus wound   Posture    Posture Forward head position;Protracted shoulders   Range of Motion (ROM)   ROM Quick Adds No deficits were identified   Strength   Strength Comments generalized weakness per mobility    Bed Mobility   Bed Mobility Comments IND   Transfer Skills   Transfer Comments IND   Gait   Gait Comments pt ambulates 150ft with 4WW and SBA   Balance   Balance Comments uses 4WW for balance   Sensory Examination   Sensory Perception Comments reports neuropathy in hands and feet B   General Therapy Interventions   Planned Therapy Interventions progressive activity/exercise;home program guidelines;risk factor education;gait training;balance training   Clinical Impression   Criteria for Skilled Therapeutic Intervention yes, treatment indicated   PT Diagnosis impaired mobility    Influenced by the following impairments impaired balalance, activity tolerance   Functional limitations due to impairments impaired safety with gait    Clinical Presentation Stable/Uncomplicated   Clinical Presentation Rationale pt with clear presentation    Clinical Decision Making (Complexity) Low complexity   Therapy Frequency 6x/week   Predicted Duration of Therapy Intervention (days/wks) 3 days    Anticipated Discharge Disposition Home with Assist;Home with Outpatient Therapy   Risk & Benefits of therapy have been explained Yes   Patient, Family & other staff in agreement with plan of care Yes   Clinical Impression Comments Pt may benefit from OP CR at discharge pending progress   [facility] AM-PAC TM \"6 Clicks\"   2016, Trustees of [facility], under license to [employer]. " " All rights reserved.   6 Clicks Short Forms Basic Mobility Inpatient Short Form   [facility] AM-PAC  \"6 Clicks\" V.2 Basic Mobility Inpatient Short Form   1. Turning from your back to your side while in a flat bed without using bedrails? 4 - None   2. Moving from lying on your back to sitting on the side of a flat bed without using bedrails? 4 - None   3. Moving to and from a bed to a chair (including a wheelchair)? 4 - None   4. Standing up from a chair using your arms (e.g., wheelchair, or bedside chair)? 4 - None   5. To walk in hospital room? 4 - None   6. Climbing 3-5 steps with a railing? 3 - A Little   Basic Mobility Raw Score (Score out of 24.Lower scores equate to lower levels of function) 23   Total Evaluation Time   Total Evaluation Time (Minutes) 4     "

## 2019-12-13 NOTE — PLAN OF CARE
Pt admit 12/12 with palpitations + bradycardia. Pt A/Ox4. VSS. RA. HR 30s-50s this AM. PPM placed this afternoon; monitor currently showing paced rhythm at ~59. Per cath lab report - pacemaker set to AAIR-DDDR (MVP). Dressing over L chest C/D/I. L arm CMS intact, +2 L radial pulse. L arm placed in sling and pt reminded to not lift arm/etc. R foot wound. WOC consult and orthosis lower extremity consult placed. See wound care orders. Continue with plan of care and report changes to treatment team. Continue to monitor L dressing, for s/s of bleeding, etc.

## 2019-12-13 NOTE — PROGRESS NOTES
"12/13/19, 10:30 AM, [facility], [place] UNIT [address]C [facility] [id number], female, Height: 4' 10\", Weight: 134 lbs 0 oz, Ordered by: Milly Ramírez PA-C, MRN #:[id number].    S:   Patient was seen today present for the adjustment of the right AFO delivered on 10/16/17.  Patient vocalized concerns of the following issues with the brace: patient is experiencing lateral malleolar pressures in the brace when ambulatory.    O:   Patient currently has a swollen affected side lateral malleolar region.    A:   Upon inspecting the brace it appeared that the brace was causing an issue with rubbing on the lateral mallolar region of the affected side when weightbearing .  The brace adjustments made on today's visit: Heat relieved and flared out the problem region for the patient and cleaned up the brace for maintenance.  Upon donning the brace again the patient vocalized satisfaction with the current fit and feel of the brace following the adjustments today.    P:   Patient is to resume wearing the orthosis with the finished adjustments upon completion. Patient expressed satisfaction with the results of this visit. Will follow-up PRN.    Electronically signed by Emeterio Mann CPO, LPO, MSPO.  "

## 2019-12-13 NOTE — PLAN OF CARE
D: Pt admitted for symptomatic bradycardia. H/O paroxysmal atrial fibrillation (s/p cardioversions x3), VF cardiac arrest, ventricular septal detect repair (1969), diffuse large B cell lymphoma (in remission), RA, thrombocytopenia, pulmonary HTN, HTN, and HLD   I: Monitored/assessed pt. Assisted with cares.  A: Pt VS stable and comfortable. No pain or shortness of breath noted. Rhythm remains junct vs afib 40-60s. Murmur present. Pt has a closed sore on R ankle from her orthotics that has been slow to heal. Pt NPO for possible PPM placement in am. Pt amb to BR with walker and SBA.  P: Continue to monitor/assess pt, contact provider with concerns.

## 2019-12-14 ENCOUNTER — APPOINTMENT (OUTPATIENT)
Dept: GENERAL RADIOLOGY | Facility: CLINIC | Age: 59
DRG: 244 | End: 2019-12-14
Attending: STUDENT IN AN ORGANIZED HEALTH CARE EDUCATION/TRAINING PROGRAM
Payer: COMMERCIAL

## 2019-12-14 ENCOUNTER — APPOINTMENT (OUTPATIENT)
Dept: PHYSICAL THERAPY | Facility: CLINIC | Age: 59
DRG: 244 | End: 2019-12-14
Payer: COMMERCIAL

## 2019-12-14 VITALS
TEMPERATURE: 97.8 F | DIASTOLIC BLOOD PRESSURE: 79 MMHG | HEART RATE: 59 BPM | HEIGHT: 58 IN | SYSTOLIC BLOOD PRESSURE: 118 MMHG | OXYGEN SATURATION: 94 % | RESPIRATION RATE: 16 BRPM | BODY MASS INDEX: 27.96 KG/M2 | WEIGHT: 133.2 LBS

## 2019-12-14 PROCEDURE — 25000132 ZZH RX MED GY IP 250 OP 250 PS 637: Performed by: STUDENT IN AN ORGANIZED HEALTH CARE EDUCATION/TRAINING PROGRAM

## 2019-12-14 PROCEDURE — 25000125 ZZHC RX 250: Performed by: STUDENT IN AN ORGANIZED HEALTH CARE EDUCATION/TRAINING PROGRAM

## 2019-12-14 PROCEDURE — 40000986 XR CHEST 2 VW

## 2019-12-14 PROCEDURE — 97530 THERAPEUTIC ACTIVITIES: CPT | Mod: GP | Performed by: PHYSICAL THERAPIST

## 2019-12-14 PROCEDURE — 99239 HOSP IP/OBS DSCHRG MGMT >30: CPT | Performed by: INTERNAL MEDICINE

## 2019-12-14 PROCEDURE — 97110 THERAPEUTIC EXERCISES: CPT | Mod: GP | Performed by: PHYSICAL THERAPIST

## 2019-12-14 PROCEDURE — 25000131 ZZH RX MED GY IP 250 OP 636 PS 637: Performed by: STUDENT IN AN ORGANIZED HEALTH CARE EDUCATION/TRAINING PROGRAM

## 2019-12-14 RX ORDER — CLINDAMYCIN HCL 300 MG
300 CAPSULE ORAL 4 TIMES DAILY
Qty: 20 CAPSULE | Refills: 0 | Status: SHIPPED | OUTPATIENT
Start: 2019-12-14 | End: 2020-03-20

## 2019-12-14 RX ADMIN — PREDNISONE 5 MG: 5 TABLET ORAL at 07:59

## 2019-12-14 RX ADMIN — ACETAMINOPHEN 650 MG: 325 TABLET, FILM COATED ORAL at 08:12

## 2019-12-14 RX ADMIN — HYDROXYCHLOROQUINE SULFATE 200 MG: 200 TABLET, FILM COATED ORAL at 07:59

## 2019-12-14 RX ADMIN — GABAPENTIN 200 MG: 100 CAPSULE ORAL at 14:01

## 2019-12-14 RX ADMIN — FUROSEMIDE 20 MG: 20 TABLET ORAL at 07:58

## 2019-12-14 RX ADMIN — TOLTERODINE 4 MG: 4 CAPSULE, EXTENDED RELEASE ORAL at 07:59

## 2019-12-14 RX ADMIN — MULTIPLE VITAMINS W/ MINERALS TAB 1 TABLET: TAB at 07:59

## 2019-12-14 RX ADMIN — CLINDAMYCIN IN 5 PERCENT DEXTROSE 600 MG: 12 INJECTION, SOLUTION INTRAVENOUS at 05:27

## 2019-12-14 RX ADMIN — CLINDAMYCIN IN 5 PERCENT DEXTROSE 600 MG: 12 INJECTION, SOLUTION INTRAVENOUS at 12:03

## 2019-12-14 RX ADMIN — GABAPENTIN 200 MG: 100 CAPSULE ORAL at 07:59

## 2019-12-14 RX ADMIN — Medication 2 TABLET: at 07:58

## 2019-12-14 RX ADMIN — SPIRONOLACTONE 25 MG: 25 TABLET ORAL at 07:59

## 2019-12-14 ASSESSMENT — ACTIVITIES OF DAILY LIVING (ADL)
ADLS_ACUITY_SCORE: 13

## 2019-12-14 ASSESSMENT — MIFFLIN-ST. JEOR: SCORE: 1068.94

## 2019-12-14 ASSESSMENT — PAIN DESCRIPTION - DESCRIPTORS: DESCRIPTORS: ACHING

## 2019-12-14 NOTE — PROGRESS NOTES
DISCHARGE      12/14/2019  2:50 PM  ----------------------------------------------------------------------------  Discharged to: Home  Via: Automobile  Accompanied by: Family, pt spouse  Discharge Instructions: diet, activity, medications, follow up appointments, when to call the MD, aftercare instructions, and what to watchout for (i.e. as described in discharge paperwork,)  Prescriptions: To be filled by [facility]; medication list reviewed & sent with pt  Follow Up Appointments: arranged; information given  Belongings: Sent with pt  IV: out  Telemetry: off  Pt exhibits understanding of above discharge instructions; all questions answered.    Pt instructed to not elevate L arm, limit mobility of L arm, etc. Pt exhibited signs of understanding.    Cards 1 MD came to assess L dressing site after pressure dressing removed prior to pt discharge. Pt educated to monitor for growing lump, s/s of bleeding, etc. Pt exhibited signs of understanding.     Discharge Paperwork: Signed, copied, and sent home with patient.

## 2019-12-14 NOTE — DISCHARGE SUMMARY
Cardiology Service - Cards 1 Discharge Summary   Date of Service: 12/14/2019    Amelia Michel MRN# [id number]   YOB: 1960 Age: 59 year old     Date of Admission:  12/12/2019  Date of Discharge:  12/14/2019  2:50 PM  Admitting Physician:  Shashi Dickson MD  Discharge Physician:  Sandy Michele MD  Discharging Service:  Cards 1     Primary Provider: Gala Stringer         Reason for Admission:   Symptomatic bradycardia  H/o atrial tachyarrhythmias          Discharge Diagnosis:   As above         Procedures & Significant Findings:   Procedures:  1. Dual chamber PPM device implantation.  2. Atrial pacing to assess AV conduction.     Attending: Pj Trent MD  EP Fellow: Philip Ambrosio MD  Procedure Date: 12/13/2019     Pre-operative Diagnosis:  Sick sinus syndrome.  Device Indication: SSS  Post-operative diagnosis:   Successful implantation of PPM.  Complications: None.     Fluoroscopy time/dose: See procedure log.         Consultations:   EP         Hospital Course by Problem:    Amelia Michel is a 59 year old female with PMH of VSD s/p repair (1969), RA, HTN, insomnia, VF cardiac arrest, DLBCL, exudative pericardial effusion, and atrial tachyarrhythmias who presents with palpitations. Found to have bradycardia     #Symptomatic bradycardia  #H/o atrial tachyarrhythmias  Patient presenting with nausea, headache, and feeling of palpitations in setting of bradycardia. Patient with long-standing history of atrial tachyarrhythmias currently on atenolol and digoxin. Follows with Dr. Eaton. Has been compliant with meds, no changes recently. No other illnesses. Found to have pulses in the 40s with stable BP. No other particular complaints. Seen by Dr. Eaton in ED with plan for holding of meds overnight with plan for repeat evaluation in AM.  Telemetry overnight showed predominant junctional bradycardia with rates of 40-55. Decision made for PPM placement, which was done on 12/13.  "Tolerated well with device interrogation and CXR adequate post procedure. Discharged home with plan for device and wound check in 7-10 days, EP NP in 1 month  -after lengthy discussion with patient, stop atenolol, continue digoxin; patient wishes to discuss further w/ Dr. Eaton  -continue apixaban       Physical Exam on day of Discharge:  Vital signs:  Temp: 97.9  F (36.6  C) Temp src: Oral BP: 101/68 Pulse: 61 Heart Rate: 60 Resp: 16 SpO2: 98 % O2 Device: None (Room air)   Height: 147.3 cm (4' 10\") Weight: 60.4 kg (133 lb 3.2 oz)  Estimated body mass index is 27.84 kg/m  as calculated from the following:    Height as of this encounter: 1.473 m (4' 10\").    Weight as of this encounter: 60.4 kg (133 lb 3.2 oz).    GEN: NAD  Pulm: CTAB, no appreciable rales/rhonchi  Cardiac: Normal S1 and S2. Rrr; pacemaker site c/d/i. JVP not appreciably elevated.  Vascular: extremities  GI: soft, non distended  Neuro:  Alert and oriented x4.           Pending Results:   none         Discharge Medications:     Discharge Medication List as of 12/14/2019  2:31 PM      CONTINUE these medications which have CHANGED    Details   clindamycin (CLEOCIN) 300 MG capsule Take 1 capsule (300 mg) by mouth 4 times daily, Disp-20 capsule, R-0, E-Prescribe         CONTINUE these medications which have NOT CHANGED    Details   acetaminophen (TYLENOL) 500 MG tablet Take 500 mg by mouth every 6 hours as needed for mild pain, Historical      alendronate (FOSAMAX) 70 MG tablet Take 70 mg by mouth every 7 days On Monday, Historical      apixaban ANTICOAGULANT (ELIQUIS) 5 MG tablet Take 1 tablet (5 mg) by mouth 2 times daily, Disp-180 tablet, R-3, E-Prescribe      calcium carbonate 600 mg-vitamin D 400 units (CALTRATE) 600-400 MG-UNIT per tablet Take 2 tablets by mouth daily, Historical      digoxin (LANOXIN) 125 MCG tablet Take 1 tablet (125 mcg) by mouth daily, Disp-90 tablet, R-3, E-Prescribe      furosemide (LASIX) 20 MG tablet Take 1 tablet (20 mg) " by mouth daily, Disp-90 tablet, R-3, E-Prescribe      gabapentin (NEURONTIN) 100 MG capsule Take 200 mg by mouth 3 times daily, Historical      hydroxychloroquine (PLAQUENIL) 200 MG tablet Take 1 tablet (200 mg) by mouth daily, Disp-90 tablet, R-3, E-Prescribe      magnesium 250 MG tablet Take 1 tablet by mouth At Bedtime, Historical      multivitamin, therapeutic with minerals (THERA-VIT-M) TABS tablet Take 1 tablet by mouth daily, Disp-30 each, R-0, E-Prescribe      predniSONE (DELTASONE) 5 MG tablet Take 1 tablet (5 mg) by mouth daily, Disp-90 tablet, R-1, E-Prescribe      spironolactone (ALDACTONE) 25 MG tablet Take 25 mg by mouth daily, Historical      tolterodine ER (DETROL LA) 4 MG 24 hr capsule Take 1 capsule (4 mg) by mouth daily, Disp-90 capsule, R-3, E-Prescribe         STOP taking these medications       atenolol (TENORMIN) 25 MG tablet Comments:   Reason for Stopping:                    Discharge Instructions and Follow-Up:     Discharge Procedure Orders   Follow-Up with Electrophysiologist   Standing Status: Future Standing Exp. Date: 12/13/20   Referral Priority: Routine   Number of Visits Requested: 1     CARDIAC REHAB REFERRAL   Standing Status: Future   Referral Priority: Routine Referral Type: Rehab Therapy Cardiac Therapy   Number of Visits Requested: 1     Wound Care Referral     Reason for your hospital stay   Order Comments: You were hospitalized due to a slow heart rate. You had a pacemaker placed for this. You will follow up with cardiology clinic for ongoing monitoring of this     Adult [facility] Follow-up and recommended labs and tests   Order Comments: EP wound check in 7-10 days  EP NP in 1 month  Wound care referral  Cardiac rehab referral    Appointments on [place] and/or [facility] (with [facility] or [facility] provider or service). Call [phone] if you haven't heard regarding these appointments within 7 days of discharge.     Follow Up and recommended labs and tests   Order Comments:  EP wound check in 7-10 days  EP NP in 1 month  Wound care referral  Cardiac rehab referral     Activity   Order Comments: Your activity upon discharge: activity as tolerated     Order Specific Question Answer Comments   Is discharge order? Yes      Diet   Order Comments: Follow this diet upon discharge: Orders Placed This Encounter      Regular Diet Adult     Order Specific Question Answer Comments   Is discharge order? Yes      CARDIAC DEVICE CHECK - IN CLINIC   Standing Status: Future Standing Exp. Date: 12/14/21   Order Comments: With wound check     Order Specific Question Answer Comments   Is the patient in clinic and plans to have services today? No                Discharge Disposition:   home         Condition on Discharge:   Discharge condition: Stable   Code status on discharge: Full Code        Date of service: 12/14/2019     40 minutes spent in discharge, including >50% in counseling and coordination of care, medication review and plan of care recommended on follow up.     Patient staffed with Dr. Michele      Patient has been seen and evaluated by me. Discussed with the team and agree with the findings and plan in this resident/fellow/nurse practitioner's discharge note.    I have interviewed and examined the patient. I have reviewed the laboratory tests, imaging, and other investigations. I agree with the documentation and plan as outlined by the fellow/resident/nurse practitioner in this discharge note.    The discharge process took 40 minutes.    Sandy Michele MD, MS  Cardiology/Cardiac EP Attending Staff

## 2019-12-14 NOTE — PLAN OF CARE
D: Pt had pacemaker placed 12/13 AAIR-DDDR   Pt admitted for symptomatic bradycardia. H/O paroxysmal atrial fibrillation (s/p cardioversions x3), VF cardiac arrest, ventricular septal detect repair (1969), diffuse large B cell lymphoma (in remission), RA, thrombocytopenia, pulmonary HTN, HTN, and HLD     I: Monitored vitals and assessed pt status.   Running: Clindamycin Q8  PRN: tylenol    A: A0x4. VSS, on RA. Apaced 60. Afebrile. Pain controlled with ice and tylenol. Sling in place. CMS intact.    Temp:  [97.7  F (36.5  C)-98.3  F (36.8  C)] 97.9  F (36.6  C)  Pulse:  [46-63] 61  Heart Rate:  [48-59] 59  Resp:  [12-20] 16  BP: ()/(54-91) 140/69  SpO2:  [92 %-100 %] 100 %      P: Continue to monitor Pt status and report changes to treatment team.

## 2019-12-14 NOTE — PLAN OF CARE
PT/6c:  Discharge Planner PT   Patient plan for discharge: home w/ OP cardiac rehab (Potentially at [facility] in [place] - although pt wants to work w/ her  to establish OP CR location)  Current status: Pt is mod IND w/ all bed mobility, transfers, gait w/ 4ww, stairs. Pt w/ good awareness and application of L UE pacemaker precautions.   Barriers to return to prior living situation: none  Recommendations for discharge: home w/ OP phase II CR  Rationale for recommendations: for further aerobic conditioning, strengthening, and education in setting of pacemaker placement    Physical Therapy Discharge Summary    Reason for therapy discharge:    All goals and outcomes met, no further needs identified.    Progress towards therapy goal(s). See goals on Care Plan [facility] electronic health record for goal details.  Goals partially met.  Barriers to achieving goals:   only 2 PT sessions prior to disch; pt will continue to work on aerobic conditioning at OP Phase II CR.    Therapy recommendation(s):    Continued therapy is recommended.  Rationale/Recommendations:  OP phase II CR (see above).           Entered by: Ann Zeng 12/14/2019 9:38 AM

## 2019-12-15 ENCOUNTER — PATIENT OUTREACH (OUTPATIENT)
Dept: CARE COORDINATION | Facility: CLINIC | Age: 59
End: 2019-12-15

## 2019-12-16 ENCOUNTER — TELEPHONE (OUTPATIENT)
Dept: RHEUMATOLOGY | Facility: CLINIC | Age: 59
End: 2019-12-16

## 2019-12-16 LAB — INTERPRETATION ECG - MUSE: NORMAL

## 2019-12-16 NOTE — TELEPHONE ENCOUNTER
Prior Authorization Retail Medication Request    Medication/Dose: gabapentin 100mg caps  ICD code (if different than what is on RX):    Previously Tried and Failed:    Rationale:      Insurance Name:    Insurance ID:        Pharmacy Information (if different than what is on RX)  Name:    Phone:

## 2019-12-16 NOTE — TELEPHONE ENCOUNTER
Prior Authorization Not Needed per Insurance    Medication: gabapentin 100mg caps  Insurance Company: [employer] - Phone [phone] Fax [phone]  Expected CoPay:    $6  Pharmacy Filling the Rx: [id number] - [place], [place] - [address] AT [facility] [address]    Please see telephone encounter from 12/10/19- this has been approved previously and is good until 12/9/2020. Called [facility] at [phone] to make sure this medication is going through fine or what the rejection might state. Per pharmacist this is going through fine for a $6 copay, no PA is needed.

## 2019-12-16 NOTE — TELEPHONE ENCOUNTER
[facility]: Post-Discharge Note  SITUATION                                                      Admission:    Admission Date: 12/12/19   Reason for Admission: Symptomatic bradycardia  Discharge:   Discharge Date: 12/14/19  Discharge Diagnosis: Symptomatic bradycardia  Discharge Service: Cardiology     BACKGROUND                                                           Hospital Course by Problem:    Amelia Michel is a 59 year old female with PMH of VSD s/p repair (1969), RA, HTN, insomnia, VF cardiac arrest, DLBCL, exudative pericardial effusion, and atrial tachyarrhythmias who presents with palpitations. Found to have bradycardia     #Symptomatic bradycardia  #H/o atrial tachyarrhythmias  Patient presenting with nausea, headache, and feeling of palpitations in setting of bradycardia. Patient with long-standing history of atrial tachyarrhythmias currently on atenolol and digoxin. Follows with Dr. Eaton. Has been compliant with meds, no changes recently. No other illnesses. Found to have pulses in the 40s with stable BP. No other particular complaints. Seen by Dr. Eaton in ED with plan for holding of meds overnight with plan for repeat evaluation in AM.  Telemetry overnight showed predominant junctional bradycardia with rates of 40-55. Decision made for PPM placement, which was done on 12/13. Tolerated well with device interrogation and CXR adequate post procedure. Discharged home with plan for device and wound check in 7-10 days, EP NP in 1 month  -after lengthy discussion with patient, stop atenolol, continue digoxin; patient wishes to discuss further w/ Dr. Eaton  -continue apixaban    ASSESSMENT      Discharge Assessment  Patient reports symptoms are: Improved  Does the patient have all of their medications?: Yes  Does patient know what their new medications are for?: Yes  Does patient have a follow-up appointment scheduled?: Yes  Does patient have any other questions or concerns?:  No    Post-op  Did the patient have surgery or a procedure: Yes  Incision: healing  Drainage: No  Bleeding: none  Fever: No  Chills: No  Redness: No  Warmth: No  Swelling: No  Incision site pain: No  Eating & Drinking: eating and drinking without complaints/concerns  PO Intake: regular diet  Bowel Function: normal  Urinary Status: voiding without complaint/concerns    PLAN                                                      Outpatient Plan:      EP wound check in 7-10 days  EP NP in 1 month  Wound care referral  Cardiac rehab referral    Future Appointments   Date Time Provider Department Center   3/11/2020 11:00 AM Fred[facility]N [facility]   3/19/2020  1:30 PM  [facility]   3/19/2020  2:00 PM Lenore Rodriguez MD [facility]           Stephanie Dawkins, [facility]

## 2019-12-20 ENCOUNTER — ANCILLARY PROCEDURE (OUTPATIENT)
Dept: CARDIOLOGY | Facility: CLINIC | Age: 59
End: 2019-12-20
Payer: COMMERCIAL

## 2019-12-20 DIAGNOSIS — R00.1 BRADYCARDIA: Primary | ICD-10-CM

## 2019-12-20 DIAGNOSIS — R00.1 BRADYCARDIA: ICD-10-CM

## 2019-12-20 NOTE — PATIENT INSTRUCTIONS
It was a pleasure to see you in clinic today.  Please do not hesitate to call with any questions or concerns.  We look forward to seeing you in clinic on 1/9/20.     Maria Esther Marcus RN, BSN  Electrophysiology Nurse Clinician  [facility]    During Business Hours Please Call:  [phone]  After Hours Please Call:  [phone] - select option #4 and ask for job code [phone]

## 2019-12-26 LAB
MDC_IDC_LEAD_IMPLANT_DT: NORMAL
MDC_IDC_LEAD_IMPLANT_DT: NORMAL
MDC_IDC_LEAD_LOCATION: NORMAL
MDC_IDC_LEAD_LOCATION: NORMAL
MDC_IDC_LEAD_LOCATION_DETAIL_1: NORMAL
MDC_IDC_LEAD_LOCATION_DETAIL_1: NORMAL
MDC_IDC_LEAD_MFG: NORMAL
MDC_IDC_LEAD_MFG: NORMAL
MDC_IDC_LEAD_MODEL: NORMAL
MDC_IDC_LEAD_MODEL: NORMAL
MDC_IDC_LEAD_POLARITY_TYPE: NORMAL
MDC_IDC_LEAD_POLARITY_TYPE: NORMAL
MDC_IDC_LEAD_SERIAL: NORMAL
MDC_IDC_LEAD_SERIAL: NORMAL
MDC_IDC_LEAD_SPECIAL_FUNCTION: NORMAL
MDC_IDC_LEAD_SPECIAL_FUNCTION: NORMAL
MDC_IDC_MSMT_BATTERY_DTM: NORMAL
MDC_IDC_MSMT_BATTERY_REMAINING_LONGEVITY: 174 MO
MDC_IDC_MSMT_BATTERY_RRT_TRIGGER: 2.62
MDC_IDC_MSMT_BATTERY_STATUS: NORMAL
MDC_IDC_MSMT_BATTERY_VOLTAGE: 3.18 V
MDC_IDC_MSMT_LEADCHNL_RA_IMPEDANCE_VALUE: 342 OHM
MDC_IDC_MSMT_LEADCHNL_RA_IMPEDANCE_VALUE: 437 OHM
MDC_IDC_MSMT_LEADCHNL_RA_PACING_THRESHOLD_AMPLITUDE: 0.38 V
MDC_IDC_MSMT_LEADCHNL_RA_PACING_THRESHOLD_PULSEWIDTH: 0.4 MS
MDC_IDC_MSMT_LEADCHNL_RA_SENSING_INTR_AMPL: 3 MV
MDC_IDC_MSMT_LEADCHNL_RA_SENSING_INTR_AMPL: 3.38 MV
MDC_IDC_MSMT_LEADCHNL_RV_IMPEDANCE_VALUE: 361 OHM
MDC_IDC_MSMT_LEADCHNL_RV_IMPEDANCE_VALUE: 456 OHM
MDC_IDC_MSMT_LEADCHNL_RV_PACING_THRESHOLD_AMPLITUDE: 0.5 V
MDC_IDC_MSMT_LEADCHNL_RV_PACING_THRESHOLD_AMPLITUDE: 0.75 V
MDC_IDC_MSMT_LEADCHNL_RV_PACING_THRESHOLD_PULSEWIDTH: 0.4 MS
MDC_IDC_MSMT_LEADCHNL_RV_PACING_THRESHOLD_PULSEWIDTH: 0.4 MS
MDC_IDC_MSMT_LEADCHNL_RV_SENSING_INTR_AMPL: 7.75 MV
MDC_IDC_MSMT_LEADCHNL_RV_SENSING_INTR_AMPL: 8.12 MV
MDC_IDC_PG_IMPLANT_DTM: NORMAL
MDC_IDC_PG_MFG: NORMAL
MDC_IDC_PG_MODEL: NORMAL
MDC_IDC_PG_SERIAL: NORMAL
MDC_IDC_PG_TYPE: NORMAL
MDC_IDC_SESS_CLINIC_NAME: NORMAL
MDC_IDC_SESS_DTM: NORMAL
MDC_IDC_SESS_TYPE: NORMAL
MDC_IDC_SET_BRADY_AT_MODE_SWITCH_RATE: 171 {BEATS}/MIN
MDC_IDC_SET_BRADY_HYSTRATE: NORMAL
MDC_IDC_SET_BRADY_LOWRATE: 60 {BEATS}/MIN
MDC_IDC_SET_BRADY_MAX_SENSOR_RATE: 130 {BEATS}/MIN
MDC_IDC_SET_BRADY_MAX_TRACKING_RATE: 130 {BEATS}/MIN
MDC_IDC_SET_BRADY_MODE: NORMAL
MDC_IDC_SET_BRADY_PAV_DELAY_LOW: 180 MS
MDC_IDC_SET_BRADY_SAV_DELAY_LOW: 150 MS
MDC_IDC_SET_LEADCHNL_RA_PACING_AMPLITUDE: 3.5 V
MDC_IDC_SET_LEADCHNL_RA_PACING_ANODE_ELECTRODE_1: NORMAL
MDC_IDC_SET_LEADCHNL_RA_PACING_ANODE_LOCATION_1: NORMAL
MDC_IDC_SET_LEADCHNL_RA_PACING_CAPTURE_MODE: NORMAL
MDC_IDC_SET_LEADCHNL_RA_PACING_CATHODE_ELECTRODE_1: NORMAL
MDC_IDC_SET_LEADCHNL_RA_PACING_CATHODE_LOCATION_1: NORMAL
MDC_IDC_SET_LEADCHNL_RA_PACING_POLARITY: NORMAL
MDC_IDC_SET_LEADCHNL_RA_PACING_PULSEWIDTH: 0.4 MS
MDC_IDC_SET_LEADCHNL_RA_SENSING_ANODE_ELECTRODE_1: NORMAL
MDC_IDC_SET_LEADCHNL_RA_SENSING_ANODE_LOCATION_1: NORMAL
MDC_IDC_SET_LEADCHNL_RA_SENSING_CATHODE_ELECTRODE_1: NORMAL
MDC_IDC_SET_LEADCHNL_RA_SENSING_CATHODE_LOCATION_1: NORMAL
MDC_IDC_SET_LEADCHNL_RA_SENSING_POLARITY: NORMAL
MDC_IDC_SET_LEADCHNL_RA_SENSING_SENSITIVITY: 0.3 MV
MDC_IDC_SET_LEADCHNL_RV_PACING_AMPLITUDE: 3.5 V
MDC_IDC_SET_LEADCHNL_RV_PACING_ANODE_ELECTRODE_1: NORMAL
MDC_IDC_SET_LEADCHNL_RV_PACING_ANODE_LOCATION_1: NORMAL
MDC_IDC_SET_LEADCHNL_RV_PACING_CAPTURE_MODE: NORMAL
MDC_IDC_SET_LEADCHNL_RV_PACING_CATHODE_ELECTRODE_1: NORMAL
MDC_IDC_SET_LEADCHNL_RV_PACING_CATHODE_LOCATION_1: NORMAL
MDC_IDC_SET_LEADCHNL_RV_PACING_POLARITY: NORMAL
MDC_IDC_SET_LEADCHNL_RV_PACING_PULSEWIDTH: 0.4 MS
MDC_IDC_SET_LEADCHNL_RV_SENSING_ANODE_ELECTRODE_1: NORMAL
MDC_IDC_SET_LEADCHNL_RV_SENSING_ANODE_LOCATION_1: NORMAL
MDC_IDC_SET_LEADCHNL_RV_SENSING_CATHODE_ELECTRODE_1: NORMAL
MDC_IDC_SET_LEADCHNL_RV_SENSING_CATHODE_LOCATION_1: NORMAL
MDC_IDC_SET_LEADCHNL_RV_SENSING_POLARITY: NORMAL
MDC_IDC_SET_LEADCHNL_RV_SENSING_SENSITIVITY: 0.9 MV
MDC_IDC_SET_ZONE_DETECTION_INTERVAL: 350 MS
MDC_IDC_SET_ZONE_DETECTION_INTERVAL: 400 MS
MDC_IDC_SET_ZONE_TYPE: NORMAL
MDC_IDC_STAT_BRADY_AP_VP_PERCENT: 0.06 %
MDC_IDC_STAT_BRADY_AP_VS_PERCENT: 54.18 %
MDC_IDC_STAT_BRADY_AS_VP_PERCENT: 0.03 %
MDC_IDC_STAT_BRADY_AS_VS_PERCENT: 45.79 %
MDC_IDC_STAT_BRADY_DTM_END: NORMAL
MDC_IDC_STAT_BRADY_DTM_START: NORMAL
MDC_IDC_STAT_BRADY_RA_PERCENT_PACED: 8.16 %
MDC_IDC_STAT_BRADY_RV_PERCENT_PACED: 3.95 %
MDC_IDC_STAT_EPISODE_RECENT_COUNT: 0
MDC_IDC_STAT_EPISODE_RECENT_COUNT: 1
MDC_IDC_STAT_EPISODE_RECENT_COUNT: 21
MDC_IDC_STAT_EPISODE_RECENT_COUNT_DTM_END: NORMAL
MDC_IDC_STAT_EPISODE_RECENT_COUNT_DTM_START: NORMAL
MDC_IDC_STAT_EPISODE_TOTAL_COUNT: 0
MDC_IDC_STAT_EPISODE_TOTAL_COUNT: 1
MDC_IDC_STAT_EPISODE_TOTAL_COUNT: 21
MDC_IDC_STAT_EPISODE_TOTAL_COUNT_DTM_END: NORMAL
MDC_IDC_STAT_EPISODE_TOTAL_COUNT_DTM_START: NORMAL
MDC_IDC_STAT_EPISODE_TYPE: NORMAL

## 2020-01-08 DIAGNOSIS — I48.92 ATRIAL FLUTTER, UNSPECIFIED TYPE (H): Primary | ICD-10-CM

## 2020-01-09 ENCOUNTER — ANCILLARY PROCEDURE (OUTPATIENT)
Dept: CARDIOLOGY | Facility: CLINIC | Age: 60
End: 2020-01-09
Attending: INTERNAL MEDICINE
Payer: COMMERCIAL

## 2020-01-09 DIAGNOSIS — R00.1 BRADYCARDIA: ICD-10-CM

## 2020-01-09 NOTE — PATIENT INSTRUCTIONS
It was a pleasure to see you in clinic today. Please do not hesitate to call with any questions or concerns.  We look forward to seeing you in clinic at your next device check on 2-5-20    Ailyn Alvarez RN  Electrophysiology Nurse Clinician  [facility]  During business hours call:  [phone]  After business hours please call: [phone]- select option #4 and ask for job code [phone].

## 2020-01-10 LAB
MDC_IDC_EPISODE_DTM: NORMAL
MDC_IDC_EPISODE_DURATION: 6 S
MDC_IDC_EPISODE_DURATION: 7 S
MDC_IDC_EPISODE_DURATION: NORMAL S
MDC_IDC_EPISODE_DURATION: NORMAL S
MDC_IDC_EPISODE_ID: 23
MDC_IDC_EPISODE_ID: 24
MDC_IDC_EPISODE_ID: 25
MDC_IDC_EPISODE_ID: 26
MDC_IDC_EPISODE_ID: 27
MDC_IDC_EPISODE_TYPE: NORMAL
MDC_IDC_LEAD_IMPLANT_DT: NORMAL
MDC_IDC_LEAD_IMPLANT_DT: NORMAL
MDC_IDC_LEAD_LOCATION: NORMAL
MDC_IDC_LEAD_LOCATION: NORMAL
MDC_IDC_LEAD_LOCATION_DETAIL_1: NORMAL
MDC_IDC_LEAD_LOCATION_DETAIL_1: NORMAL
MDC_IDC_LEAD_MFG: NORMAL
MDC_IDC_LEAD_MFG: NORMAL
MDC_IDC_LEAD_MODEL: NORMAL
MDC_IDC_LEAD_MODEL: NORMAL
MDC_IDC_LEAD_POLARITY_TYPE: NORMAL
MDC_IDC_LEAD_POLARITY_TYPE: NORMAL
MDC_IDC_LEAD_SERIAL: NORMAL
MDC_IDC_LEAD_SERIAL: NORMAL
MDC_IDC_LEAD_SPECIAL_FUNCTION: NORMAL
MDC_IDC_LEAD_SPECIAL_FUNCTION: NORMAL
MDC_IDC_MSMT_BATTERY_DTM: NORMAL
MDC_IDC_MSMT_BATTERY_REMAINING_LONGEVITY: 174 MO
MDC_IDC_MSMT_BATTERY_RRT_TRIGGER: 2.62
MDC_IDC_MSMT_BATTERY_STATUS: NORMAL
MDC_IDC_MSMT_BATTERY_VOLTAGE: 3.19 V
MDC_IDC_MSMT_LEADCHNL_RA_IMPEDANCE_VALUE: 380 OHM
MDC_IDC_MSMT_LEADCHNL_RA_IMPEDANCE_VALUE: 475 OHM
MDC_IDC_MSMT_LEADCHNL_RA_PACING_THRESHOLD_AMPLITUDE: 0.38 V
MDC_IDC_MSMT_LEADCHNL_RA_PACING_THRESHOLD_PULSEWIDTH: 0.4 MS
MDC_IDC_MSMT_LEADCHNL_RA_SENSING_INTR_AMPL: 2.88 MV
MDC_IDC_MSMT_LEADCHNL_RA_SENSING_INTR_AMPL: 3 MV
MDC_IDC_MSMT_LEADCHNL_RV_IMPEDANCE_VALUE: 475 OHM
MDC_IDC_MSMT_LEADCHNL_RV_IMPEDANCE_VALUE: 570 OHM
MDC_IDC_MSMT_LEADCHNL_RV_PACING_THRESHOLD_AMPLITUDE: 0.5 V
MDC_IDC_MSMT_LEADCHNL_RV_PACING_THRESHOLD_AMPLITUDE: 0.5 V
MDC_IDC_MSMT_LEADCHNL_RV_PACING_THRESHOLD_PULSEWIDTH: 0.4 MS
MDC_IDC_MSMT_LEADCHNL_RV_PACING_THRESHOLD_PULSEWIDTH: 0.4 MS
MDC_IDC_MSMT_LEADCHNL_RV_SENSING_INTR_AMPL: 10.38 MV
MDC_IDC_MSMT_LEADCHNL_RV_SENSING_INTR_AMPL: 9.88 MV
MDC_IDC_PG_IMPLANT_DTM: NORMAL
MDC_IDC_PG_MFG: NORMAL
MDC_IDC_PG_MODEL: NORMAL
MDC_IDC_PG_SERIAL: NORMAL
MDC_IDC_PG_TYPE: NORMAL
MDC_IDC_SESS_CLINIC_NAME: NORMAL
MDC_IDC_SESS_DTM: NORMAL
MDC_IDC_SESS_TYPE: NORMAL
MDC_IDC_SET_BRADY_AT_MODE_SWITCH_RATE: 171 {BEATS}/MIN
MDC_IDC_SET_BRADY_HYSTRATE: NORMAL
MDC_IDC_SET_BRADY_LOWRATE: 60 {BEATS}/MIN
MDC_IDC_SET_BRADY_MAX_SENSOR_RATE: 130 {BEATS}/MIN
MDC_IDC_SET_BRADY_MAX_TRACKING_RATE: 130 {BEATS}/MIN
MDC_IDC_SET_BRADY_MODE: NORMAL
MDC_IDC_SET_BRADY_PAV_DELAY_LOW: 180 MS
MDC_IDC_SET_BRADY_SAV_DELAY_LOW: 150 MS
MDC_IDC_SET_LEADCHNL_RA_PACING_AMPLITUDE: 3.5 V
MDC_IDC_SET_LEADCHNL_RA_PACING_ANODE_ELECTRODE_1: NORMAL
MDC_IDC_SET_LEADCHNL_RA_PACING_ANODE_LOCATION_1: NORMAL
MDC_IDC_SET_LEADCHNL_RA_PACING_CAPTURE_MODE: NORMAL
MDC_IDC_SET_LEADCHNL_RA_PACING_CATHODE_ELECTRODE_1: NORMAL
MDC_IDC_SET_LEADCHNL_RA_PACING_CATHODE_LOCATION_1: NORMAL
MDC_IDC_SET_LEADCHNL_RA_PACING_POLARITY: NORMAL
MDC_IDC_SET_LEADCHNL_RA_PACING_PULSEWIDTH: 0.4 MS
MDC_IDC_SET_LEADCHNL_RA_SENSING_ANODE_ELECTRODE_1: NORMAL
MDC_IDC_SET_LEADCHNL_RA_SENSING_ANODE_LOCATION_1: NORMAL
MDC_IDC_SET_LEADCHNL_RA_SENSING_CATHODE_ELECTRODE_1: NORMAL
MDC_IDC_SET_LEADCHNL_RA_SENSING_CATHODE_LOCATION_1: NORMAL
MDC_IDC_SET_LEADCHNL_RA_SENSING_POLARITY: NORMAL
MDC_IDC_SET_LEADCHNL_RA_SENSING_SENSITIVITY: 0.3 MV
MDC_IDC_SET_LEADCHNL_RV_PACING_AMPLITUDE: 3.5 V
MDC_IDC_SET_LEADCHNL_RV_PACING_ANODE_ELECTRODE_1: NORMAL
MDC_IDC_SET_LEADCHNL_RV_PACING_ANODE_LOCATION_1: NORMAL
MDC_IDC_SET_LEADCHNL_RV_PACING_CAPTURE_MODE: NORMAL
MDC_IDC_SET_LEADCHNL_RV_PACING_CATHODE_ELECTRODE_1: NORMAL
MDC_IDC_SET_LEADCHNL_RV_PACING_CATHODE_LOCATION_1: NORMAL
MDC_IDC_SET_LEADCHNL_RV_PACING_POLARITY: NORMAL
MDC_IDC_SET_LEADCHNL_RV_PACING_PULSEWIDTH: 0.4 MS
MDC_IDC_SET_LEADCHNL_RV_SENSING_ANODE_ELECTRODE_1: NORMAL
MDC_IDC_SET_LEADCHNL_RV_SENSING_ANODE_LOCATION_1: NORMAL
MDC_IDC_SET_LEADCHNL_RV_SENSING_CATHODE_ELECTRODE_1: NORMAL
MDC_IDC_SET_LEADCHNL_RV_SENSING_CATHODE_LOCATION_1: NORMAL
MDC_IDC_SET_LEADCHNL_RV_SENSING_POLARITY: NORMAL
MDC_IDC_SET_LEADCHNL_RV_SENSING_SENSITIVITY: 0.9 MV
MDC_IDC_SET_ZONE_DETECTION_INTERVAL: 350 MS
MDC_IDC_SET_ZONE_DETECTION_INTERVAL: 400 MS
MDC_IDC_SET_ZONE_TYPE: NORMAL
MDC_IDC_STAT_BRADY_DTM_END: NORMAL
MDC_IDC_STAT_BRADY_DTM_START: NORMAL
MDC_IDC_STAT_BRADY_RA_PERCENT_PACED: 0.01 %
MDC_IDC_STAT_BRADY_RV_PERCENT_PACED: 22.04 %
MDC_IDC_STAT_EPISODE_RECENT_COUNT: 0
MDC_IDC_STAT_EPISODE_RECENT_COUNT: 0
MDC_IDC_STAT_EPISODE_RECENT_COUNT: 3
MDC_IDC_STAT_EPISODE_RECENT_COUNT_DTM_END: NORMAL
MDC_IDC_STAT_EPISODE_RECENT_COUNT_DTM_START: NORMAL
MDC_IDC_STAT_EPISODE_TOTAL_COUNT: 0
MDC_IDC_STAT_EPISODE_TOTAL_COUNT: 1
MDC_IDC_STAT_EPISODE_TOTAL_COUNT: 24
MDC_IDC_STAT_EPISODE_TOTAL_COUNT_DTM_END: NORMAL
MDC_IDC_STAT_EPISODE_TOTAL_COUNT_DTM_START: NORMAL
MDC_IDC_STAT_EPISODE_TYPE: NORMAL

## 2020-01-10 RX ORDER — DIGOXIN 125 MCG
125 TABLET ORAL DAILY
Qty: 90 TABLET | Refills: 0 | Status: SHIPPED | OUTPATIENT
Start: 2020-01-10 | End: 2020-04-10

## 2020-02-21 DIAGNOSIS — I48.0 PAROXYSMAL ATRIAL FIBRILLATION (H): ICD-10-CM

## 2020-02-21 NOTE — PROGRESS NOTES
[facility], [place]   Hospitalist Daily Progress Note                                                 Date of Admission:2017  ___________________________________  INTERVAL HISTORY (24 Hrs)/SUBJECTIVE:   Last 24 hr events, care team notes reviewed.     Doing well  No new s/s  No fever or chills  No obvious bleeding or new bruising.   No cp or sob.   Diuresing well.     ROS: 4 point ROS neg other than the symptoms noted above in the interval history.    OBJECTIVE :   VITALS:    Temp:  [96.1  F (35.6  C)-97  F (36.1  C)] 97  F (36.1  C)  Pulse:  [62-82] 82  Resp:  [16] 16  BP: (138-139)/(57-60) 138/57  SpO2:  [95 %-96 %] 96 %     Temp (24hrs), Av.6  F (35.9  C), Min:96.1  F (35.6  C), Max:97  F (36.1  C)        Wt Readings from Last 5 Encounters:   17 56.1 kg (123 lb 9.6 oz)   17 66 kg (145 lb 9.6 oz)   17 75.7 kg (166 lb 12.8 oz)   17 72.8 kg (160 lb 8 oz)   17 64.4 kg (141 lb 15.6 oz)        Intake/Output Summary (Last 24 hours) at 17 1222  Last data filed at 17 0821   Gross per 24 hour   Intake              240 ml   Output             3550 ml   Net            -3310 ml         PHYSICAL EXAM:  General: alert, interactive, NAD  HEENT: AT/NC,  Moist MM  Respi/Chest: Non labored.cta bl  CVS/Heart: S1S2 regular,   Gi/Abd: Soft, non tender, non distended,   MSK/Ext: Distal pulses 2+.  bl 1-2+ pedal edema upto thighs (improving).   Neuro: AO x 4, generalized physical deconditioning. Moving all extremities.      Medications:   I have reviewed this patient's current medications.    Data:   All laboratory and imaging data in the past 24 hours reviewed:    LABS:  CMP  Recent Labs  Lab 17  0541 17  0825 17  0547 17  0659 17  1605 17  0204  17  0309  17  0402     --   --  134 131* 131*  < > 135  < > 139   POTASSIUM 4.2  --  4.0 4.0 4.6 3.8  < > 4.2  < > 2.9*   CHLORIDE 95  --   --  90* 90* 89*  <  Patient reports good fm, no n/v, headache, cramping, bleeding, loss of fluid, edema, dom violence, or smoking    melquiades pnv urine neg/neg ft/80/-1 soft post > 94  < > 96   CO2 33*  --   --  34* 33* 36*  < > 36*  < > 35*   ANIONGAP 9  --   --  10 8 6  < > 5  < > 9   GLC 76  --   --  159* 223* 233*  < > 199*  < > 191*   BUN 23  --   --  33* 34* 34*  < > 30  < > 27   CR 0.49*  --  0.51* 0.50* 0.53 0.51*  < > 0.54  < > 0.50*   GFRESTIMATED >90Non  GFR Calc  --  >90Non  GFR Calc >90Non  GFR Calc >90Non  GFR Calc >90Non  GFR Calc  < > >90Non  GFR Calc  < > >90Non  GFR Calc   GFRESTBLACK >90African American GFR Calc  --  >90African American GFR Calc >90African American GFR Calc >90African American GFR Calc >90African American GFR Calc  < > >90African American GFR Calc  < > >90African American GFR Calc   VIKTORIYA 9.0  --   --  8.8 8.4* 8.5  < > 8.2*  < > 7.6*   MAG 2.2 2.0  --  2.0  --  2.2  < > 2.4*  < > 2.0   PHOS 3.5  --   --   --   --  3.1  --  2.3*  --  2.9   PROTTOTAL 6.2*  --   --  6.1*  --  5.9*  --  5.7*  --  5.7*   ALBUMIN 3.3*  --   --  3.3*  --  3.1*  --  2.9*  --  2.8*   BILITOTAL 1.2  --   --  1.1  --  1.2  --  1.2  --  1.3   ALKPHOS 189*  --   --  226*  --  232*  --  234*  --  261*   AST 63*  --   --  69*  --  75*  --  69*  --  82*   *  --   --  119*  --  108*  --  91*  --  86*   < > = values in this interval not displayed.  CBC    Recent Labs  Lab 08/07/17  0541 08/06/17  0547 08/05/17  1012 08/05/17  0659   WBC 0.2* 0.3* 0.4* 0.4*   RBC 2.73* 2.74* 2.92* 2.80*   HGB 8.0* 8.2* 8.5* 8.3*   HCT 24.7* 24.8* 26.5* 25.3*   MCV 91 91 91 90   MCH 29.3 29.9 29.1 29.6   MCHC 32.4 33.1 32.1 32.8   RDW 23.3* 23.3* 23.5* 23.6*   PLT 15* 11* 10* 10*     INR    Recent Labs  Lab 08/07/17  0541 08/04/17  0204 08/02/17  0402   INR 1.21* 1.42* 1.44*     Unresulted Labs Ordered in the Past 30 Days of this Admission     Date and Time Order Name Status Description    7/28/2017 0115 Transfusion reaction evaluation In process     7/28/2017 0115 Transfusion reaction  evaluation In process           EK/4: Sinus shelton, HR 55, QTc 440  Echo 7/10/17:  Technically difficult study. Limited study.  Borderline (EF 50-55%) reduced left ventricular function is present.  Global right ventricular function is mildly reduced.  Moderate tricuspid insufficiency is present. This is unchanged from prior, and  was determined to be from prolapse on prior BRE.  No vegetations seen on this challenging study    ASSESSMENT & PLAN :    Amelia Michel is a 56 year old female with complex past medical history of prior VSD repair, rheumatoid arthritis on long-term methotrexate, recent dx atrial fibrillation/flutter/SVT who was recently admitted to [facility] on  after an out of hospital cardiac arrest thought to be related to AV prieto blocking agents (angio normal). She made a full recovery and was discharged to rehab on 6/15 with a lifevest. Readmitted from rehab 17 for FUO & afib w rvr. With workup of fever and progressive cytopenias, diagnosis of DLBCL was made. Transferred to the Hematology service on . s/p Cycle 1 R-EPOCH.      Transferred to ARU 2017 for her ongoing rehab needs and other cares  Medicine consulted for medical co-management.      HEME/ONC:       # Diffuse Large B-Cell Lymphoma. Stage 4 with bone marrow and liver involvement. IPI 4. Possibly related to prior methotrexate. PET/CT  revealing for mediastinal and neck level 2A lymphadenopathy, extensive FDG-avid bony lesions, hepatosplenic FDG-avid lesions, pelvic lesions contingous with the uterus, and bilateral pleural effusions. Bone marrow biopsy completed on  showed scattered plasma cells in perivascular clusters, with no definitive staining of B-cell population by CD5; on re-review, possibly consistent with intravascularge large B-cell lymphoma. However, liver biopsy () consistent with DLBCL, negative for BCL2/BCL6/MYC rearrangements, CD20 positive. PICC line placed and kept in place on discharge.  Completed Cycle 1 of dose adjusted R-EPOCH (Day 1=7/28/17). Tolerated well overall, except for reaction to Rituxan (hypotension with BP 87/45, SOB/feeling of ?throat swelling/difficulty getting breath in; then fever/rigors).   Last dose 08/01.     - patient with worsening neutropenia wbc: 0.4 ->0.3->0.2 , plt 10K 8/5/2017 s/p 1 U plt ->11 8/6/2017, now 15K  With no fever or new focal infectious s/s.   No bleeding or bruising.     - Transfusion goals:  Transfuse to maintain Hgb >8.0, platelets >=10,000. (can increase platelet goals to 20 K for new bruising). RN to make therapy team aware of low counts.   - Continue daily Neupogen until counts begin to recover and no longer neutropenic (ANC >1000).   - daily CBC w diff for now.   - Neutropenic precautions.     8/6/2017  D/w Dr Ospina (hem/onc),   Rec: Increase neupogen 480 mcg daily.   No new empiric antibiotics for now.  If febrile: Consider infectious work up including BC x 2 sites. UA. UC and start empirically on iv Cefepime for any fever and consider transferring back to [facility]  Continue Prophylactic antibiotics as below.     8/7/2017  No change.       ID:   # PPX. continue ppx ACV, Levaquin,  Fluconazole.  #Persistent Fevers. -- Resolved. Likely related to lymphoma.   #Lactic acidosis. -- Resolved.     Extensive infectious evaluation including blood cultures, which remain NGTD. Additionally, stool virus panel, M. Tuberculsosis, tick-borne illnesses, and bartonella/Q-fever have all been negative. Ultimately, fevers/lactate thought secondary to malignancy. Possible pneumonia while recently on meropenem (7/1-7/5); Transient rise in lactic acid during Rituxan infusion reaction (7/28).          Cardiovascular: prior vsd repair 1969  Patient followed closely and co managed by cardiology team while inpatient.      # Volume status, anasarca:   Patient diuresing a lot last 48 hours.     Wt down significantly   Lytes, cr : acceptable.   Patient asymptomatic.   VSS    -  8/6/2017  Decrease lasix 20 mg bid, hold evening dose of lasix if UOP >2L during the day.   Continue spironolactone 12.5 daily.     8/7/2017  - hold lasix and spironolactone.   Reassess daily     K, mg protocol.    -- I/O, frequent BMP.  - daily wt.      # Paroxysmal a-fib/a flutter: Rate controlled with current atenolol 25 mg daily and digoxin 125 mcg daily.   -- Continue current regimen  -- No anticoagulation due to thromboycytopenia         # S/P recent out of hospital cardiac arrest (05/29/17): Etiology remains unclear, possibly related to AV prieto blocking agents.  -- She meets criteria for secondary prevention ICD. Placement is currently on hold given LUE extravasation wound, also pending treatment and prognosis of lymphoma.   -- EP discussed risk vs benefit of Lifevest prior to discharge from hospital, she would like to defer at this time  -- QTc 440 (08/04)  -- K, Mg protocol (keep above 4 and 2)  -- Plan to see Dr. Dickson in clinic in 2-3 months         GI:   #LFT derangement. 2/2 lymphoma involvement, recent chemo. Asymptomatic.   - Monitor LFT twice weekly at ARU   stable    # Bowel regimen PRN      NEURO:  # 'saddle anesthesia' , urinary incontinence and paresthesias.    seen by Neurology in hospital  Rec:   She would benefit with a urodynamic study by Urology to know the pattern of her incontinence (detrusor hyperactivity X overflow) as an outpatient basis.   ?CSF studies to investigate CNS involvement in lymphoma. She refers that it is in the plan of the primary team in the future.        - could try trial to void after she is done with active diuresis or off diuretics as able.     RHEUM:   #Rheumatoid arthritis   History of seropositive rheumatoid arthritis (anti-CCP positive) since late 1980;s w/ multiple MTP osteotomies, partial right wrist fusion, longstanding therapy consisting of MTX, prednisone and HCQ. Tapered to 5 mg prednisone on 7/17 with continued monitoring for flaring. Prednisone was  held with higher dose steroids during treatment plan. Resumed 5mg dose as of 8/4.     -- Follow-up with [facility] Dr. Conor Cunha in 4-6 weeks after discharge      ENDO:  #Steroid induced hyperglycemia. Continue med SSI. Monitor BS.       Recent Labs  Lab 08/07/17  1145 08/07/17  0736 08/07/17  0541 08/06/17  2101 08/06/17  1727 08/06/17  1243 08/06/17  0819  08/05/17  0659  08/04/17  1605  08/04/17  0204  08/03/17  1827  08/03/17  0309   GLC  --   --  76  --   --   --   --   --  159*  --  223*  --  233*  --  245*  --  199*   * 90  --  162* 134* 123* 96  < >  --   < >  --   < >  --   < >  --   < >  --    < > = values in this interval not displayed.    DERM:   #Extravasation-related LUE wound. Slowly improving.  - Continue wound care  - WOCN consult.      Electrolytes abnormality:   Hypokalemia: 2/2 lasix. On po supplement. Monitor and replace as needed. Keep >4  Mg: keep above 2.0.    Hyponatremia: 131 -> 134     Severe Physical Deconditioning. 2/2 prolonged hospital stays, acute illness/malignancy.   - Therapy per PM & R team.         - follow up at oncology clinic 8/16         Thank you for this interesting consultation.    Will fu.   Please page with any Q     D/w RN. PM&R team.      Johnie Chino MD   Hospitalist (Internal Medicine)  Pager: [phone].

## 2020-02-24 NOTE — TELEPHONE ENCOUNTER
ELIQUIS 5MG TABLETS    Last Written Prescription Date:  2/22/19  Last Fill Quantity: 180,   # refills: 3  Last Office Visit : 2/22/19  Future Office visit:  4/10/20    Routing refill request to provider for review/approval because:  Abnormal plt  Platelet Count   Date Value Ref Range Status   12/13/2019 138 (L) 150 - 450 10e9/L Final     Thank-you, Lorie CABRERA RN Medication Refill Team

## 2020-03-05 ENCOUNTER — TELEPHONE (OUTPATIENT)
Dept: RHEUMATOLOGY | Facility: CLINIC | Age: 60
End: 2020-03-05

## 2020-03-11 ENCOUNTER — OFFICE VISIT (OUTPATIENT)
Dept: RHEUMATOLOGY | Facility: CLINIC | Age: 60
End: 2020-03-11
Attending: NURSE PRACTITIONER
Payer: COMMERCIAL

## 2020-03-11 VITALS
BODY MASS INDEX: 27.99 KG/M2 | HEART RATE: 96 BPM | DIASTOLIC BLOOD PRESSURE: 83 MMHG | SYSTOLIC BLOOD PRESSURE: 125 MMHG | TEMPERATURE: 97.5 F | OXYGEN SATURATION: 97 % | WEIGHT: 133.9 LBS

## 2020-03-11 DIAGNOSIS — T38.0X5A STEROID-INDUCED OSTEOPOROSIS: ICD-10-CM

## 2020-03-11 DIAGNOSIS — M81.8 STEROID-INDUCED OSTEOPOROSIS: ICD-10-CM

## 2020-03-11 DIAGNOSIS — M05.79 RHEUMATOID ARTHRITIS INVOLVING MULTIPLE SITES WITH POSITIVE RHEUMATOID FACTOR (H): ICD-10-CM

## 2020-03-11 PROCEDURE — G0463 HOSPITAL OUTPT CLINIC VISIT: HCPCS | Mod: ZF

## 2020-03-11 RX ORDER — PREDNISONE 5 MG/1
5 TABLET ORAL DAILY
Qty: 90 TABLET | Refills: 3 | Status: SHIPPED | OUTPATIENT
Start: 2020-03-11 | End: 2021-03-10

## 2020-03-11 ASSESSMENT — PAIN SCALES - GENERAL: PAINLEVEL: MODERATE PAIN (4)

## 2020-03-11 NOTE — PROGRESS NOTES
[facility] - Rheumatology Clinic Visit    Amelia Michel  is a 59 year old here for rheumatoid arthritis (RA) dx 35 y/o. +CCP >331 -RF -KALYAN panel. 5-2108 XR 5-2018 DDD and facet osteoarthritis, congential fusion C2-3) prednisone since dx at 35 y/o, hydroxychloroquine (plaquenil) long-time since dx tapered by 200 mg 9-2019. Lymphoma in liver, heart, bone and mass between esophagus, left pelvic, right ankle     Review-Dr. Dumas in [facility] for many years. She had relatively inactive disease prior to her arrest but was on methotrexate 10 mg PO per week, Arava 10 mg daily, Plaquenil 200 mg twice per day, and prednisone 3 mg daily. She has had partial fusion of her right wrist. After discharge from the hospital she was put on prednisone 5 mg daily as monotherapy. Sulfasalazine -not effective long-time, initial alan but resolved after retried. Past Dr. Cunha and Dr. 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 Treated concervatively with plaquenil and 5mg prednisone, and not interested in rituximab or other biologics. She has previously been treated with sulfasalazine and initially developed a rash which was initially thought to be a sulfa rash, but her rash resolved and did not reoccur after being placed back on sulfasalazine. She was also treated with Arava, but this discontinued during her cancer treatment. She feels that these drugs have had no effect on her symptoms. Etanercept (enbrel) in past no benefits.       Copy forward  Dr. Pritchett 2-2019:      Interval history 3/30/18: On 3/30/18, her rheumatoid arthritis appeared to be under good control. Her plan at this time was to continue prednisone 5mg daily and HCQ 200mg twice a day. She was told to have an eye exam and follow up in 3 months. It was recommended that she get a DEXA scan to evaluate whether bisphosphonate was  indicated (had 7-8 years of Fosamax in early 2000's). Previous use of methotrexate has been linked to her development of a severe neuropathy in the digits of her upper and lower extremities. She has also previously used arava.    Interval history 5/31/18: The patient reports spraining her ankle prior to her cardiac arrest, which caused some neuropathy in her sprained foot. Therefore she is currently wearing an ankle brace and using a walker. However, her arthritis is doing fine she reports that the colchicine she was recently prescribed has been very effective. She mentions that she is switching insurance companies and had an unexpected arm surgery, which has prevented her from getting an eye exam that she is aware of being overdue for. She was also laid off recently but was able to fill the majority of her medications and made the most of her previous insurance so she should be able to manage for a while. She has undergone multiple CT scans and is currently finished with chemotherapy. Lymphoma is in remission and her heart is doing fine. She is interested in the options she has for medications that manage her rheumatoid arthritis and her lymphoma. She has taken many medications including plaquenil, prednisone, arava, methotrexate, orencia, and rituximab (for lymphoma), and has plenty of options to choose from if she wants to lower the cost of prescriptions or avoid unwanted side-effects. The plan at this time was to continue prednisone 5mg daily.     2-2019: Today, Amelia reports that she has had increased pain in her right shoulder, primarily losing rande of motion to extension. Notes that this has gotten worse after colchicine (for pericarditis) was stopped. She currently wears a brace on her feet to treat neuropathy, and uses a walker. She notes that these braces alter her gait, and as a result, has had some knee and low back pain, worse towards the end of the day.    Initial visit September 11, 2019  Frederic  "Fransisca APRN:   Chronic low back pain into right knee, more this past month lumbar then right sided \"tennis ball\". Not evaluated over the time. Right foot neuropathy, with brace and some in left foot, left hand neuropathy chronic \"amiodorone infiltrated\". Chronic right hand arthralgia.     Gait impaired, walker for fear of falling since cardiac arrest 5/2017 lymphoma in heart led of afib long-term rehab, refractory afib before that. S/p cardioversion. Bone marrow bx 7/2017. Treatment July 2017 to 11/29/2017 (IV and intrathecal) think neuropathy came from, cardiomegaly from this and heart failure. EF 60-65% 9-2017, some effusion. Colchicine during this one a day and 'arthritis did great with that\". Never gone down on hydroxychloroquine (plaquenil)     Weather more arthralgia. Prednisone 5 mg once day, hydroxychloroquine (plaquenil) 200 mg BID , gabapentin 200 Mg TID very effective no side-effects, no vision issues. Caltrate BID, fosamax 70 mg every 7 day, right foot to 2\" strip to outside leg chronic stops at hip (one day had sattel anesthesia-image negative, did see neurology, who started gabapentin 7-2016), bilateral arches feet, left hand neuropathy is chronic left 1-3 fingers no feeling or the back side arm to elbow. No vision issues. No falls or injuries. No neck issues.       March 11, 2020  PPM 12-13 new this is helping, lower energy was put in for bradycardia, felt like was when had rapid afib.     Trouble with hands, hard to use like using for cleaning. 1-3 fingers affected. Right ankle sore. Pang are the best time of year for her. Summer and warmer weather is harder. Right wrist fused. Denies any fever, chills, SOB, MONTALVO, night sweats, or chest pain, high fever, cough, travel in last 14 days or exposure to covid-19 (coronavirus). Reports healthy.chronic numb from prior lymphoma left outer arm, left bicep, left 4-5.     On fosamax per PCP, she will follow-up with PCP to review length of time on this and " "follow-up . Prednisone 5 mg once day chronically, hydroxychloroquine (plaquenil) 200 mg once day         PMH:  Injury-left thumb amputation, left finger broke  Medical-  Antiplatelet or antithrombotic long-term use  Arrhythmia  Atrial tachycardia, flutter  Paroxysmal atrial fibrillation  Arthritis  Lymphoma  Cardiac arrest  history of chemotherapy  Primary pulmonary hypertension  Neuropathy  Postoperative nausea and vomiting  Rheumatoid arthritis  Hyperlipidemia LDL goal <130  Lymphedema of both lower extremities  Cardiogenic shock and cardiac arrest 5/2017  Acute respiratory failure with hypoxia  Critical illness myopathy  Sepsis  Wound of left upper extremity  Diffuse large B-cell lymphoma of extranodal site  Febrile neutropenia  Physical deconditioning  Palpitations  Osteoporosis  -DEXA 2018   Slowly healing wound in medial left antecubital fossa after traumatic IV  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, latter felt from lymphoma   Neuropathy of right foot with weakness after intrathecal cytarabine  perioditis   Right arm PICC blood clots during cancer treatments  Right shoulder effusion when had pericarditis       Past Surgical History:  Anesthesia cardioversion  Right wrist arthrodesis, partial fusion \"history migrated out\"  Bone marrow aspirate & biopsy  Excise lesion upper extremity - left wound excision and closure, possible submuscular transposition of ulnar nerve  Foot surgery - 4 left, 2 right  Carpal tunnel bilateral release  Repair ventricular septal defect  Transpositional ulnar nerve (elbow) left   Right shoulder effusion history     Family History:   No autoimmune disorders, psoriasis, UC, crohn's, SLE, RA, PsA, gout, autoimmune thyroid.  No MS, heart disease in family  Mother - breast cancer, hypertension, alzheimer disease-passed   Father - hypertension, lymphoma, circulatory A fib-passed  Paternal grandmother - diabetes  Siblings-younger sister heatlhy PB, younger " brother think arthritis not dx PB     Social History:   No smoking or tobacco use. No alcohol use. No IVDU. None Children. Right handed. . Disability      [facility] personally reviewed and updated by me.    ROS:  Negative raynaud s phenomena, hairloss, sun sensitivities, pleurisy, inflammatory joint symptoms, significant rashes like malar, oral/nasal or ulcerations, inflammatory eye disease, inflammatory bowel disease, dactylitis, tenosynovitis, or enthespathy. Negative for miscarriages over 2 months into pregnancy, gout, psoriasis, UC, crohn's. No temporal headache, no jaw claudication, no scalp tenderness, vision changes, carotidynia, cough. No STD. No Parotid swelling. Denies international travel. Denies history of pneumonia, hepatitis, tuberculosis, shingles, tick bites or other infections.     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 No acute distress, swollen glands  EYES: No vision change, diplopia, pain in eyes or red eyes   EARS, NOSE, MOUTH, THROAT: No tinnitus or hearing change, no epistaxis or nasal discharge, no oral lesions, throat clear. Normal saliva pool.  No drymouth. No thyroid enlargement.   CARDIOVASCULAR: No chest pain, palpitations, or pain with walking, no orthopnea or PND   RESPIRATORY: No dyspnea, cough, shortness of breath or wheezing. No pleurisy.   GI: No nausea, vomiting, diarrhea or constipation, no abdominal pain, or blood in stools.   : No change in urine, no dysuria or hematuria   MUSCKL: No swollen, tender, red or painful joints. No nodules. No enthesitis, plantar fascitis or heel pain.   INTEGUMENTARY: No concerning lesions or moles   NEURO: No loss of strength or sensation, no numbness or tingling, no tremor, no dizziness, no headache. No falls   ENDO: No polyuria or polydipsia, no temperature intolerance   HEME/LYMPH:No concerning bumps,  bleeding problems, or swollen lymph nodes. No recent infections, hospitalizations or new illnesses.   PSYCH:No depression or anxiety, no sleep problems.  Otherwise 14 point ROS obtained, reviewed and found negative.     Physical exam:  Vitals: Blood pressure 125/83, pulse 96, temperature 97.5  F (36.4  C), temperature source Oral, weight 60.7 kg (133 lb 14.4 oz), SpO2 97 %, not currently breastfeeding.  Wt Readings from Last 4 Encounters:   03/11/20 60.7 kg (133 lb 14.4 oz)   12/14/19 60.4 kg (133 lb 3.2 oz)   11/19/19 60.3 kg (133 lb)   11/04/19 61.5 kg (135 lb 9.3 oz)     Constitutional: WD-WN-WG cooperative. +cushionging  Eyes: nl EOM, PERRLA, vision, conjunctiva, sclera, No Unilateral or bilateral external ear inflammation   ENT: nl external ears, nose, hearing, lips, teeth, gums, throat. No saddle nose   Neck: no mass or thyroid enlargement  Resp: lungs clear to auscultation, nl to palpation, nl effort  CV: RRR, no murmurs, rubs or gallops, no edema  GI: no ABD mass or tenderness, no HSM  : not tested  Lymph: no cervical or epitrochlear nodes  MS: Wrists with markedly limited ROM at baseline, no synovitis. Pannus right wrist, Several deformities present in right and left fingers, worse in right righ ulnar deviation. In particular, right 2nd and 3rd MCP joints with mild chronic pannus. Ulnar deviation bilaterally. Right z-thumb. -SLR All TMJ, neck, shoulder, elbow, wrist, hand, spine, hip, knee, ankle, and foot joints were examined and otherwise found normal. No active synovitis. Negative Lhermitte's sign. No periuncle erythema  Skin: No nail pitting, alopecia, rash, nodules or lesions; left elbow old scar and right hand.   Neuro: + chronic polyneuropathy with diminished sensation in BL LE.   Skin: no nail pitting, alopecia, rash  Neuro: nl cranial nerves, strength, sensation, DTRs.   Psych: nl judgement, orientation, memory, affect.      Labs/Imaging:  Eye Exam (10/23/18)  Significant for mild H-lated nuclear  cataracts.   Ocular CT was recommended for right eye.     CT Chest/Abdomen/Pelvis (9/26/18)  In this patient with a history of lymphoma:  1. No evidence of disease recurrence, consistent with complete  response per Lugano criteria.  2. Stable cardiomegaly. Improved pericardial effusion.  3. Early contrast phase with heterogeneous visceral enhancement in the  abdomen, likely secondary to cardiac dysfunction.    MRI Cardiac w/contrast (4/12/18)  Loculated exudative pericardial effusion that is beginning to organize, with diffuse  pericardial enhancement consistent with ongoing inflammation. Normal LV fxn, LVEF 54% and RVEF 49%. At  least moderate, possibly severe tricuspid regurgitation. Compared to MRI from 03/2018, effusion is largely  unchanged in size (maybe a bit smaller) but is starting to organize. No change in pericardial enhancement.    Laboratory:     Component      Latest Ref Rng & Units 12/12/2019 12/13/2019   WBC      4.0 - 11.0 10e9/L 6.8 5.1   RBC Count      3.8 - 5.2 10e12/L 3.99 3.93   Hemoglobin      11.7 - 15.7 g/dL 14.2 13.8   Hematocrit      35.0 - 47.0 % 42.1 41.4   MCV      78 - 100 fl 106 (H) 105 (H)   MCH      26.5 - 33.0 pg 35.6 (H) 35.1 (H)   MCHC      31.5 - 36.5 g/dL 33.7 33.3   RDW      10.0 - 15.0 % 12.5 12.5   Platelet Count      150 - 450 10e9/L 150 138 (L)   Diff Method       Automated Method    % Neutrophils      % 87.0    % Lymphocytes      % 5.6    % Monocytes      % 6.3    % Eosinophils      % 0.4    % Basophils      % 0.4    % Immature Granulocytes      % 0.3    Nucleated RBCs      0 /100 0    Absolute Neutrophil      1.6 - 8.3 10e9/L 6.0    Absolute Lymphocytes      0.8 - 5.3 10e9/L 0.4 (L)    Absolute Monocytes      0.0 - 1.3 10e9/L 0.4    Absolute Eosinophils      0.0 - 0.7 10e9/L 0.0    Absolute Basophils      0.0 - 0.2 10e9/L 0.0    Abs Immature Granulocytes      0 - 0.4 10e9/L 0.0    Absolute Nucleated RBC       0.0    Sodium      133 - 144 mmol/L 138 140   Potassium       3.4 - 5.3 mmol/L 4.2 3.7   Chloride      94 - 109 mmol/L 105 106   Carbon Dioxide      20 - 32 mmol/L 26 28   Anion Gap      3 - 14 mmol/L 7 6   Glucose      70 - 99 mg/dL 104 (H) 82   Urea Nitrogen      7 - 30 mg/dL 19 22   Creatinine      0.52 - 1.04 mg/dL 0.78 0.78   GFR Estimate      >60 mL/min/1.73:m2 83 83   GFR Estimate If Black      >60 mL/min/1.73:m2 >90 >90   Calcium      8.5 - 10.1 mg/dL 9.8 9.3   Bilirubin Total      0.2 - 1.3 mg/dL 0.8    Albumin      3.4 - 5.0 g/dL 4.0    Protein Total      6.8 - 8.8 g/dL 7.6    Alkaline Phosphatase      40 - 150 U/L 70    ALT      0 - 50 U/L 60 (H)    AST      0 - 45 U/L Canceled, Test credited    Digoxin Level      0.5 - 2.0 ug/L  0.7     Impression/Plan:  1. Seropositive rheumatoid arthritis (, RF 31) with multiple joint disability including MTP fusion 1-5 bilaterally, partial right wrist fusion, subluxation and ulnar deformity of MCP's. Rheumatoid arthritis (RA) not controlled.  Will continue prednisone, and hydroxychloroquine (plaquenil) 200 mg once a day. Prn joint injection or a short burst of prednisone for symptom relief (per her preference). Discussion of options with her leflunomide (arava) --had  Liver involvment with #3, sulfasalazine re-trial, adalimumab (humira) but higher risk of cancer. She will discuss with oncology and think about this. She is not interested in using an alternative (leflunomide or rituximab) for RA.  Advise cervical x-rays prior to any surgeries, does have DDD (no symptoms at this time). Poor prognosis, discussed risk extraarticular and deformities risks and of AA instability, she understands.  2. Long-term hydroxychloroquine (plaquenil) use since 6-2017, OCT  no toxicity, reviewed AAO guidelines, repeat every year. 200 mg once a day   3. Diffuse large B-cell lymphoma status-post 1 cycle R-EPOCH, 6 cycles R-CHOP; per oncology for surveillance   4. Osteoporosis, chronic long-term corticosteroid use with cushingoid  appearance. Received 6-7 years of alendronate in early 2000s. Restarted alendronate in 1/2019. DEXA  T-2.6 thoracic. Continue vit D-Ca supplementation, walker for fall risk prevention but should discuss with PCP about this as if > 5 yr higher risk atypical infections  5. 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 Low back pain, no radiculopathy.  If any redflags, 911/ER. If not improved or new symptoms, would advise follow-up with PCP and do imaging (risk of compression fx with osteoporosis )  8. Loculated pericardial effusion, etiology unclear (lymphoma). No symptoms now. Per oncology    RTC 6 month  90 day prescriptions per insurance   Advise review pneumonia vaccines with PCP     The patient understood the rationale for the diagnosis and treatment plan. Patient shared in the decision making. All questions were answered to best of my ability and the patient's satisfaction and agrees with the plan.     Frederic JOHNSON, CNP, MSN  [facility]  Department of Rheumatology & Autoimmune Disorders    1. Immunization: Reviewed CDC guidelines with patient updated, information provided to patient based on CDC guidelines for vaccination recommendations, patient will discuss with primary provider-advised review with PCP and if needs repeat pneumonia 23 vaccine (last 2011)  2. Bone Health:Educated on adequate calcium and vitamin D intake, Educated on exercise, Glucocorticoid education discussed risks, benefits & potential long term side effects from use  3. Discussed routine annual physicals, cancer screening, cardiac risk monitoring, bone health and primary care with primary care provider. Included is glucocorticosteroid increase change of infections.   4. Educated on diagnosis, prognosis, labs, imaging, treatment options (including risks, benefits, risk of no treatment), medications (use, dose, side-effects, risks of medications  including infection/cancer/bone marrow suppression, lab monitoring), infections what to do, plan of cares, goals of treatment. Clinic cards given. Websites given for resources and education   5. Educated in Rheumatology we do not prescribe narcotics or manage chronic pain, the patient will need to discuss with primary care or go to a pain clinic for management.

## 2020-03-11 NOTE — PATIENT INSTRUCTIONS
Hydroxychloroquine (plaquenil) lowest infection risks, risk of eye being on longer then 5 years    Follow-up  On right anklle, and bone health on fosamax over 5 yreawr higher risk of ayptical fractures     Sulfasalazine then retrial, then rash tablet twice a day 2 tablet with lab monitoring     Do leflunomide (arava) -this is once a day and needs lab monitoring --this can affect the liver    Then adalimumab (humira) this is stronger, higher risk of infections and cancer risks     PNEUMONIA 23 OR PNEUMOVAX IS DUE IF LONGER THEN 5 YEARS SINCE THE LAST AND >8 WEEK FROM PREVNAR

## 2020-03-11 NOTE — NURSING NOTE
Chief Complaint   Patient presents with     RECHECK     RA           /83 (BP Location: Right arm, Patient Position: Sitting, Cuff Size: Adult Regular)   Pulse 96   Temp 97.5  F (36.4  C) (Oral)   Wt 60.7 kg (133 lb 14.4 oz)   SpO2 97%   BMI 27.99 kg/m            Arcelia Bess CMA    3/11/2020 11:02 AM

## 2020-03-11 NOTE — LETTER
3/11/2020      RE: Amelia Michel  [address]  [place] [address]       [facility] - Rheumatology Clinic Visit    Amelia Michel  is a 59 year old here for rheumatoid arthritis (RA) dx 35 y/o. +CCP >331 -RF -KALYAN panel. 5-2108 XR 5-2018 DDD and facet osteoarthritis, congential fusion C2-3) prednisone since dx at 35 y/o, hydroxychloroquine (plaquenil) long-time since dx tapered by 200 mg 9-2019. Lymphoma in liver, heart, bone and mass between esophagus, left pelvic, right ankle     Review-Dr. Dumas in [facility] for many years. She had relatively inactive disease prior to her arrest but was on methotrexate 10 mg PO per week, Arava 10 mg daily, Plaquenil 200 mg twice per day, and prednisone 3 mg daily. She has had partial fusion of her right wrist. After discharge from the hospital she was put on prednisone 5 mg daily as monotherapy. Sulfasalazine -not effective long-time, initial alan but resolved after retried. Past Dr. Cunha and Dr. 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 Treated concervatively with plaquenil and 5mg prednisone, and not interested in rituximab or other biologics. She has previously been treated with sulfasalazine and initially developed a rash which was initially thought to be a sulfa rash, but her rash resolved and did not reoccur after being placed back on sulfasalazine. She was also treated with Arava, but this discontinued during her cancer treatment. She feels that these drugs have had no effect on her symptoms. Etanercept (enbrel) in past no benefits.       Copy forward  Dr. Prithcett 2-2019:      Interval history 3/30/18: On 3/30/18, her rheumatoid arthritis appeared to be under good control. Her plan at this time was to continue prednisone 5mg daily and HCQ 200mg twice a day. She was told to have an eye exam and follow up in 3 months. It  was recommended that she get a DEXA scan to evaluate whether bisphosphonate was indicated (had 7-8 years of Fosamax in early 2000's). Previous use of methotrexate has been linked to her development of a severe neuropathy in the digits of her upper and lower extremities. She has also previously used arava.    Interval history 5/31/18: The patient reports spraining her ankle prior to her cardiac arrest, which caused some neuropathy in her sprained foot. Therefore she is currently wearing an ankle brace and using a walker. However, her arthritis is doing fine she reports that the colchicine she was recently prescribed has been very effective. She mentions that she is switching insurance companies and had an unexpected arm surgery, which has prevented her from getting an eye exam that she is aware of being overdue for. She was also laid off recently but was able to fill the majority of her medications and made the most of her previous insurance so she should be able to manage for a while. She has undergone multiple CT scans and is currently finished with chemotherapy. Lymphoma is in remission and her heart is doing fine. She is interested in the options she has for medications that manage her rheumatoid arthritis and her lymphoma. She has taken many medications including plaquenil, prednisone, arava, methotrexate, orencia, and rituximab (for lymphoma), and has plenty of options to choose from if she wants to lower the cost of prescriptions or avoid unwanted side-effects. The plan at this time was to continue prednisone 5mg daily.     2-2019: Today, Amelia reports that she has had increased pain in her right shoulder, primarily losing rande of motion to extension. Notes that this has gotten worse after colchicine (for pericarditis) was stopped. She currently wears a brace on her feet to treat neuropathy, and uses a walker. She notes that these braces alter her gait, and as a result, has had some knee and low back pain,  "worse towards the end of the day.    Initial visit September 11, 2019  Frederic Gongora APRN:   Chronic low back pain into right knee, more this past month lumbar then right sided \"tennis ball\". Not evaluated over the time. Right foot neuropathy, with brace and some in left foot, left hand neuropathy chronic \"amiodorone infiltrated\". Chronic right hand arthralgia.     Gait impaired, walker for fear of falling since cardiac arrest 5/2017 lymphoma in heart led of afib long-term rehab, refractory afib before that. S/p cardioversion. Bone marrow bx 7/2017. Treatment July 2017 to 11/29/2017 (IV and intrathecal) think neuropathy came from, cardiomegaly from this and heart failure. EF 60-65% 9-2017, some effusion. Colchicine during this one a day and 'arthritis did great with that\". Never gone down on hydroxychloroquine (plaquenil)     Weather more arthralgia. Prednisone 5 mg once day, hydroxychloroquine (plaquenil) 200 mg BID , gabapentin 200 Mg TID very effective no side-effects, no vision issues. Caltrate BID, fosamax 70 mg every 7 day, right foot to 2\" strip to outside leg chronic stops at hip (one day had sattel anesthesia-image negative, did see neurology, who started gabapentin 7-2016), bilateral arches feet, left hand neuropathy is chronic left 1-3 fingers no feeling or the back side arm to elbow. No vision issues. No falls or injuries. No neck issues.       March 11, 2020  PPM 12-13 new this is helping, lower energy was put in for bradycardia, felt like was when had rapid afib.     Trouble with hands, hard to use like using for cleaning. 1-3 fingers affected. Right ankle sore. Pang are the best time of year for her. Summer and warmer weather is harder. Right wrist fused. Denies any fever, chills, SOB, MONTALVO, night sweats, or chest pain, high fever, cough, travel in last 14 days or exposure to covid-19 (coronavirus). Reports healthy.chronic numb from prior lymphoma left outer arm, left bicep, left 4-5.     On fosamax " "per PCP, she will follow-up with PCP to review length of time on this and follow-up . Prednisone 5 mg once day chronically, hydroxychloroquine (plaquenil) 200 mg once day         PMH:  Injury-left thumb amputation, left finger broke  Medical-  Antiplatelet or antithrombotic long-term use  Arrhythmia  Atrial tachycardia, flutter  Paroxysmal atrial fibrillation  Arthritis  Lymphoma  Cardiac arrest  history of chemotherapy  Primary pulmonary hypertension  Neuropathy  Postoperative nausea and vomiting  Rheumatoid arthritis  Hyperlipidemia LDL goal <130  Lymphedema of both lower extremities  Cardiogenic shock and cardiac arrest 5/2017  Acute respiratory failure with hypoxia  Critical illness myopathy  Sepsis  Wound of left upper extremity  Diffuse large B-cell lymphoma of extranodal site  Febrile neutropenia  Physical deconditioning  Palpitations  Osteoporosis  -DEXA 2018   Slowly healing wound in medial left antecubital fossa after traumatic IV  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, latter felt from lymphoma   Neuropathy of right foot with weakness after intrathecal cytarabine  perioditis   Right arm PICC blood clots during cancer treatments  Right shoulder effusion when had pericarditis       Past Surgical History:  Anesthesia cardioversion  Right wrist arthrodesis, partial fusion \"history migrated out\"  Bone marrow aspirate & biopsy  Excise lesion upper extremity - left wound excision and closure, possible submuscular transposition of ulnar nerve  Foot surgery - 4 left, 2 right  Carpal tunnel bilateral release  Repair ventricular septal defect  Transpositional ulnar nerve (elbow) left   Right shoulder effusion history     Family History:   No autoimmune disorders, psoriasis, UC, crohn's, SLE, RA, PsA, gout, autoimmune thyroid.  No MS, heart disease in family  Mother - breast cancer, hypertension, alzheimer disease-passed   Father - hypertension, lymphoma, circulatory A " fib-passed  Paternal grandmother - diabetes  Siblings-younger sister heatlhy PB, younger brother think arthritis not dx PB     Social History:   No smoking or tobacco use. No alcohol use. No IVDU. None Children. Right handed. . Disability      [facility] personally reviewed and updated by me.    ROS:  Negative raynaud s phenomena, hairloss, sun sensitivities, pleurisy, inflammatory joint symptoms, significant rashes like malar, oral/nasal or ulcerations, inflammatory eye disease, inflammatory bowel disease, dactylitis, tenosynovitis, or enthespathy. Negative for miscarriages over 2 months into pregnancy, gout, psoriasis, UC, crohn's. No temporal headache, no jaw claudication, no scalp tenderness, vision changes, carotidynia, cough. No STD. No Parotid swelling. Denies international travel. Denies history of pneumonia, hepatitis, tuberculosis, shingles, tick bites or other infections.     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 No acute distress, swollen glands  EYES: No vision change, diplopia, pain in eyes or red eyes   EARS, NOSE, MOUTH, THROAT: No tinnitus or hearing change, no epistaxis or nasal discharge, no oral lesions, throat clear. Normal saliva pool.  No drymouth. No thyroid enlargement.   CARDIOVASCULAR: No chest pain, palpitations, or pain with walking, no orthopnea or PND   RESPIRATORY: No dyspnea, cough, shortness of breath or wheezing. No pleurisy.   GI: No nausea, vomiting, diarrhea or constipation, no abdominal pain, or blood in stools.   : No change in urine, no dysuria or hematuria   MUSCKL: No swollen, tender, red or painful joints. No nodules. No enthesitis, plantar fascitis or heel pain.   INTEGUMENTARY: No concerning lesions or moles   NEURO: No loss of strength or sensation, no numbness or tingling, no tremor, no dizziness, no headache. No falls   ENDO:  No polyuria or polydipsia, no temperature intolerance   HEME/LYMPH:No concerning bumps, bleeding problems, or swollen lymph nodes. No recent infections, hospitalizations or new illnesses.   PSYCH:No depression or anxiety, no sleep problems.  Otherwise 14 point ROS obtained, reviewed and found negative.     Physical exam:  Vitals: Blood pressure 125/83, pulse 96, temperature 97.5  F (36.4  C), temperature source Oral, weight 60.7 kg (133 lb 14.4 oz), SpO2 97 %, not currently breastfeeding.  Wt Readings from Last 4 Encounters:   03/11/20 60.7 kg (133 lb 14.4 oz)   12/14/19 60.4 kg (133 lb 3.2 oz)   11/19/19 60.3 kg (133 lb)   11/04/19 61.5 kg (135 lb 9.3 oz)     Constitutional: WD-WN-WG cooperative. +cushionging  Eyes: nl EOM, PERRLA, vision, conjunctiva, sclera, No Unilateral or bilateral external ear inflammation   ENT: nl external ears, nose, hearing, lips, teeth, gums, throat. No saddle nose   Neck: no mass or thyroid enlargement  Resp: lungs clear to auscultation, nl to palpation, nl effort  CV: RRR, no murmurs, rubs or gallops, no edema  GI: no ABD mass or tenderness, no HSM  : not tested  Lymph: no cervical or epitrochlear nodes  MS: Wrists with markedly limited ROM at baseline, no synovitis. Pannus right wrist, Several deformities present in right and left fingers, worse in right righ ulnar deviation. In particular, right 2nd and 3rd MCP joints with mild chronic pannus. Ulnar deviation bilaterally. Right z-thumb. -SLR All TMJ, neck, shoulder, elbow, wrist, hand, spine, hip, knee, ankle, and foot joints were examined and otherwise found normal. No active synovitis. Negative Lhermitte's sign. No periuncle erythema  Skin: No nail pitting, alopecia, rash, nodules or lesions; left elbow old scar and right hand.   Neuro: + chronic polyneuropathy with diminished sensation in BL LE.   Skin: no nail pitting, alopecia, rash  Neuro: nl cranial nerves, strength, sensation, DTRs.   Psych: nl judgement, orientation,  memory, affect.      Labs/Imaging:  Eye Exam (10/23/18)  Significant for mild H-lated nuclear cataracts.   Ocular CT was recommended for right eye.     CT Chest/Abdomen/Pelvis (9/26/18)  In this patient with a history of lymphoma:  1. No evidence of disease recurrence, consistent with complete  response per Lugano criteria.  2. Stable cardiomegaly. Improved pericardial effusion.  3. Early contrast phase with heterogeneous visceral enhancement in the  abdomen, likely secondary to cardiac dysfunction.    MRI Cardiac w/contrast (4/12/18)  Loculated exudative pericardial effusion that is beginning to organize, with diffuse  pericardial enhancement consistent with ongoing inflammation. Normal LV fxn, LVEF 54% and RVEF 49%. At  least moderate, possibly severe tricuspid regurgitation. Compared to MRI from 03/2018, effusion is largely  unchanged in size (maybe a bit smaller) but is starting to organize. No change in pericardial enhancement.    Laboratory:     Component      Latest Ref Rng & Units 12/12/2019 12/13/2019   WBC      4.0 - 11.0 10e9/L 6.8 5.1   RBC Count      3.8 - 5.2 10e12/L 3.99 3.93   Hemoglobin      11.7 - 15.7 g/dL 14.2 13.8   Hematocrit      35.0 - 47.0 % 42.1 41.4   MCV      78 - 100 fl 106 (H) 105 (H)   MCH      26.5 - 33.0 pg 35.6 (H) 35.1 (H)   MCHC      31.5 - 36.5 g/dL 33.7 33.3   RDW      10.0 - 15.0 % 12.5 12.5   Platelet Count      150 - 450 10e9/L 150 138 (L)   Diff Method       Automated Method    % Neutrophils      % 87.0    % Lymphocytes      % 5.6    % Monocytes      % 6.3    % Eosinophils      % 0.4    % Basophils      % 0.4    % Immature Granulocytes      % 0.3    Nucleated RBCs      0 /100 0    Absolute Neutrophil      1.6 - 8.3 10e9/L 6.0    Absolute Lymphocytes      0.8 - 5.3 10e9/L 0.4 (L)    Absolute Monocytes      0.0 - 1.3 10e9/L 0.4    Absolute Eosinophils      0.0 - 0.7 10e9/L 0.0    Absolute Basophils      0.0 - 0.2 10e9/L 0.0    Abs Immature Granulocytes      0 - 0.4 10e9/L 0.0     Absolute Nucleated RBC       0.0    Sodium      133 - 144 mmol/L 138 140   Potassium      3.4 - 5.3 mmol/L 4.2 3.7   Chloride      94 - 109 mmol/L 105 106   Carbon Dioxide      20 - 32 mmol/L 26 28   Anion Gap      3 - 14 mmol/L 7 6   Glucose      70 - 99 mg/dL 104 (H) 82   Urea Nitrogen      7 - 30 mg/dL 19 22   Creatinine      0.52 - 1.04 mg/dL 0.78 0.78   GFR Estimate      >60 mL/min/1.73:m2 83 83   GFR Estimate If Black      >60 mL/min/1.73:m2 >90 >90   Calcium      8.5 - 10.1 mg/dL 9.8 9.3   Bilirubin Total      0.2 - 1.3 mg/dL 0.8    Albumin      3.4 - 5.0 g/dL 4.0    Protein Total      6.8 - 8.8 g/dL 7.6    Alkaline Phosphatase      40 - 150 U/L 70    ALT      0 - 50 U/L 60 (H)    AST      0 - 45 U/L Canceled, Test credited    Digoxin Level      0.5 - 2.0 ug/L  0.7     Impression/Plan:  1. Seropositive rheumatoid arthritis (, RF 31) with multiple joint disability including MTP fusion 1-5 bilaterally, partial right wrist fusion, subluxation and ulnar deformity of MCP's. Rheumatoid arthritis (RA) not controlled.  Will continue prednisone, and hydroxychloroquine (plaquenil) 200 mg once a day. Prn joint injection or a short burst of prednisone for symptom relief (per her preference). Discussion of options with her leflunomide (arava) --had  Liver involvment with #3, sulfasalazine re-trial, adalimumab (humira) but higher risk of cancer. She will discuss with oncology and think about this. She is not interested in using an alternative (leflunomide or rituximab) for RA.  Advise cervical x-rays prior to any surgeries, does have DDD (no symptoms at this time). Poor prognosis, discussed risk extraarticular and deformities risks and of AA instability, she understands.  2. Long-term hydroxychloroquine (plaquenil) use since 6-2017, OCT  no toxicity, reviewed AAO guidelines, repeat every year. 200 mg once a day   3. Diffuse large B-cell lymphoma status-post 1 cycle R-EPOCH, 6 cycles R-CHOP; per oncology for  surveillance   4. Osteoporosis, chronic long-term corticosteroid use with cushingoid appearance. Received 6-7 years of alendronate in early 2000s. Restarted alendronate in 1/2019. DEXA  T-2.6 thoracic. Continue vit D-Ca supplementation, walker for fall risk prevention but should discuss with PCP about this as if > 5 yr higher risk atypical infections  5. 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 Low back pain, no radiculopathy.  If any redflags, 911/ER. If not improved or new symptoms, would advise follow-up with PCP and do imaging (risk of compression fx with osteoporosis )  8. Loculated pericardial effusion, etiology unclear (lymphoma). No symptoms now. Per oncology    RTC 6 month  90 day prescriptions per insurance   Advise review pneumonia vaccines with PCP     The patient understood the rationale for the diagnosis and treatment plan. Patient shared in the decision making. All questions were answered to best of my ability and the patient's satisfaction and agrees with the plan.     Frederic JOHNSON, CNP, MSN  [facility]  Department of Rheumatology & Autoimmune Disorders    1. Immunization: Reviewed CDC guidelines with patient updated, information provided to patient based on CDC guidelines for vaccination recommendations, patient will discuss with primary provider-advised review with PCP and if needs repeat pneumonia 23 vaccine (last 2011)  2. Bone Health:Educated on adequate calcium and vitamin D intake, Educated on exercise, Glucocorticoid education discussed risks, benefits & potential long term side effects from use  3. Discussed routine annual physicals, cancer screening, cardiac risk monitoring, bone health and primary care with primary care provider. Included is glucocorticosteroid increase change of infections.   4. Educated on diagnosis, prognosis, labs, imaging, treatment options (including risks, benefits,  risk of no treatment), medications (use, dose, side-effects, risks of medications including infection/cancer/bone marrow suppression, lab monitoring), infections what to do, plan of cares, goals of treatment. Clinic cards given. Websites given for resources and education   5. Educated in Rheumatology we do not prescribe narcotics or manage chronic pain, the patient will need to discuss with primary care or go to a pain clinic for management.      ELIZABETH Pearson CNP

## 2020-03-14 DIAGNOSIS — I50.22 CHRONIC SYSTOLIC HEART FAILURE (H): ICD-10-CM

## 2020-03-18 RX ORDER — FUROSEMIDE 20 MG
20 TABLET ORAL DAILY
Qty: 90 TABLET | Refills: 3 | Status: SHIPPED | OUTPATIENT
Start: 2020-03-18 | End: 2021-03-10

## 2020-03-18 NOTE — TELEPHONE ENCOUNTER
FUROSEMIDE 20MG TABLETS      Last Written Prescription Date:  3/27/2019  Last Fill Quantity: 90,   # refills: 3  Last Office Visit : 12/13/2019  Future Office visit:  4/10/2020  90 Tabs, 3 Refills sent to pharm 3/18/2020.    Maria Luisa Westfall RN  Central Triage Red Flags/Med Refills

## 2020-03-19 ENCOUNTER — VIRTUAL VISIT (OUTPATIENT)
Dept: TRANSPLANT | Facility: CLINIC | Age: 60
End: 2020-03-19
Attending: INTERNAL MEDICINE
Payer: COMMERCIAL

## 2020-03-19 DIAGNOSIS — C83.30 DIFFUSE LARGE B-CELL LYMPHOMA, UNSPECIFIED BODY REGION (H): Primary | Chronic | ICD-10-CM

## 2020-03-19 NOTE — PROGRESS NOTES
"Amelia Michel is a 59 year old female who is being evaluated via a billable telephone visit.      The patient has been notified of following:     \"This telephone visit will be conducted via a call between you and your physician/provider. We have found that certain health care needs can be provided without the need for a physical exam.  This service lets us provide the care you need with a short phone conversation.  If a prescription is necessary we can send it directly to your pharmacy.  If lab work is needed we can place an order for that and you can then stop by our lab to have the test done at a later time.    If during the course of the call the physician/provider feels a telephone visit is not appropriate, you will not be charged for this service.\"     Amelia Michel complains of    Chief Complaint   Patient presents with     Oncology Clinic Visit     Return: DLBCL        I have reviewed and updated the patient's Past Medical History, Social History, Family History and Medication List.    ALLERGIES  Blood transfusion related (informational only); Metoprolol; No clinical screening - see comments; Penicillins; and Tape [adhesive tape]    ROSA Rahman    Oncology Phone Visit       Disease and Treatment History:  1. Complicated patient with history of Rheumatoid Arthritis status post long term treatment with methotrexate with recent significant complicated course with cardiac arrest, admission with hypotension, sepsis, ICU stay and intubation with subsequent additional readmission from TCU in 6/2017 for FUO with extensive work-up with eventual finding of progression cytopenias with eventual lymphoma diagnosis  2. Bone Marrow Biopsy: 7/12/2017: Highly suspicious for involvement by B cell lymphoma with foci of large atypical CD20+ cells  3. PET CT 7/19/2017: Extensive activity: Right paratracheal lesion with SUV 28, many liver lesions with SUV 15, cardiac lesion intraarterial septum, numerous bone " lesions.  4. 7/21 Liver Biopsy: Consistent with Diffuse Large B Cell Lymphoma non-germinal center phenotype  -- FISH for myc, bcl2, bcl6 negative for double-hit lymphoma  5. IPI based on Age < 60 (0 points) + PS 2 (1 + point) + Stage IV Disease (1 point) + Extranodal disease > 1 site (1 point) + elevated LDH (1 point) = 4 Poor Risk Disease  6. Cycle 1 given as R-EPOCH Day 1 = 7/27/2017  -- complications of transfusion dependence and need for acute rehab placement (likely more result of extensive hospital stays)  - LP negative for CNS involvement 8/23/2017  7. Cycle #2: Transition to R-CHOP Day 1 = 8/22/2017  - Day 15 high dose MTX for CNS prophylaxis  - complicated by significant fluid overload and PICC associated DVT  8. Cycle #3 Day 1 = 9/20/2017 Post Cycle 3 PET CR. Cycle 4 10/11/2017 + IT prophylaxis, Cycle 5 11/8/17, Cycle 6 11/29/2017 + IT prophylaxis  -- Post Treatment completion PET 1/15/2018: Complete Remission        HPI: I am calling Amelia for a brief phone check in today for her lymphoma. Since I last saw her she notes that she is overall stable. No new fevers or chills, no chest pain or SOB, no new lumps or bumps, no unintentional weight loss. No concerns for disease recurrence. Some discussion from her rheumatologist about changing her off plaquenil to another agent.           10 point ROS otherwise negative        Physical Exam:    No exam given phone visit    Labs:     Outside labs ordered but awaiting results      A/P: 60 yo woman with history of rhematoid arthritis and history of stage 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 Lymphoma: Got cycle #1 in the hospital and I chose R-EPOCH for infusional nature due to possible intracardiac involvement and also extensive disease to allow for slower lymphoma kill. Tolerated this well, aside from significant cytopenias (which were present at diagnosis). Then transitioned to R-CHOP to finish out a total of 6 cycles of  chemotherapy (1 R-EPOCH and 5 R-CHOP) with initially planned for High Dose MTX on Day 15 of cycle 2,4, and 6 due to high IPI but then transition to prophy IT chemo on cycle 4 and 6 due to toxicity with the high dose methotrexate.   -- PET CT post 3 cycles CR1 and repeat PET CT post therapy completion in 1/2018 showed CR1.   - Final Surveillance Scans in 9/2019 showed ongoing remission    No further scans unless symptoms develop. Next visit in 6 months with labs prior          2. ID:  Not on any abx at this point   - Shingrix vaccine booster 9/2019     3. Rheumatoid arthritis: is on prednisone 5 mg daily and plaquenil. Stable. Sounds like there is a plan to come off the plaquenil and her rheumatologist is wondering which would be least likely to influence her lymphoma recurrence risk. I suggested that she have the rheumatologist page me to discuss when they want to switch and we can discuss options. We reviewed that all agents will suppress her T cells which have the theoretical potential of allowing lymphoma cells to flare so discussion about agent will be helpful     4. CV: A. Fib. Post pacemaker placement.  - On digoxin and atenolol. Status post numerous ablations as above but best control seems to correlate with the digoxin. Currently in normal rate and rhythm  -- Pericardial effusion noted during hospitalization in 3/2018 and pericarditis on 3/8/2018 MRI. Repeat MRI 4/12/2018 showed ongoing pericardial effusion with the start of organization felt to be ongoing inflammation. CT Chest today shows no recurrence of this     5. Heme: Counts good in 12/2019        6. Neuropathy: on gabapentin. Stable. No worsening but no significant improvement either              Final Plan  - cameron and lab apt in 6 months or sooner prn     Phone call duration:  - Start Time: 2:01  - End Time: 2:12  Total Minutes:  11 minuntes    Lenore Rodriguez MD

## 2020-03-20 ENCOUNTER — TELEPHONE (OUTPATIENT)
Dept: FAMILY MEDICINE | Facility: CLINIC | Age: 60
End: 2020-03-20

## 2020-03-20 ENCOUNTER — OFFICE VISIT (OUTPATIENT)
Dept: FAMILY MEDICINE | Facility: CLINIC | Age: 60
End: 2020-03-20
Payer: COMMERCIAL

## 2020-03-20 VITALS
SYSTOLIC BLOOD PRESSURE: 126 MMHG | TEMPERATURE: 97.9 F | DIASTOLIC BLOOD PRESSURE: 72 MMHG | HEART RATE: 76 BPM | BODY MASS INDEX: 27.92 KG/M2 | OXYGEN SATURATION: 99 % | WEIGHT: 133 LBS | HEIGHT: 58 IN

## 2020-03-20 DIAGNOSIS — C83.398 DIFFUSE LARGE B-CELL LYMPHOMA OF EXTRANODAL SITE: ICD-10-CM

## 2020-03-20 DIAGNOSIS — M05.712 RHEUMATOID ARTHRITIS INVOLVING BOTH SHOULDERS WITH POSITIVE RHEUMATOID FACTOR (H): Chronic | ICD-10-CM

## 2020-03-20 DIAGNOSIS — C83.30 DIFFUSE LARGE B-CELL LYMPHOMA, UNSPECIFIED BODY REGION (H): ICD-10-CM

## 2020-03-20 DIAGNOSIS — I89.0 LYMPHEDEMA OF BOTH LOWER EXTREMITIES: ICD-10-CM

## 2020-03-20 DIAGNOSIS — M05.711 RHEUMATOID ARTHRITIS INVOLVING BOTH SHOULDERS WITH POSITIVE RHEUMATOID FACTOR (H): Chronic | ICD-10-CM

## 2020-03-20 DIAGNOSIS — D17.30 LIPOMA OF SKIN AND SUBCUTANEOUS TISSUE: Primary | ICD-10-CM

## 2020-03-20 DIAGNOSIS — I47.19 ATRIAL TACHYCARDIA (H): ICD-10-CM

## 2020-03-20 DIAGNOSIS — I10 ESSENTIAL HYPERTENSION: Chronic | ICD-10-CM

## 2020-03-20 DIAGNOSIS — M85.679 BONE CYST OF ANKLE: ICD-10-CM

## 2020-03-20 LAB
ALBUMIN SERPL-MCNC: 4.2 G/DL (ref 3.4–5)
ALP SERPL-CCNC: 77 U/L (ref 40–150)
ALT SERPL W P-5'-P-CCNC: 34 U/L (ref 0–50)
ANION GAP SERPL CALCULATED.3IONS-SCNC: 3 MMOL/L (ref 3–14)
AST SERPL W P-5'-P-CCNC: 30 U/L (ref 0–45)
BASOPHILS # BLD AUTO: 0 10E9/L (ref 0–0.2)
BASOPHILS NFR BLD AUTO: 0.4 %
BILIRUB SERPL-MCNC: 0.5 MG/DL (ref 0.2–1.3)
BUN SERPL-MCNC: 21 MG/DL (ref 7–30)
CALCIUM SERPL-MCNC: 9.8 MG/DL (ref 8.5–10.1)
CHLORIDE SERPL-SCNC: 101 MMOL/L (ref 94–109)
CO2 SERPL-SCNC: 31 MMOL/L (ref 20–32)
CREAT SERPL-MCNC: 0.93 MG/DL (ref 0.52–1.04)
DIFFERENTIAL METHOD BLD: ABNORMAL
EOSINOPHIL # BLD AUTO: 0 10E9/L (ref 0–0.7)
EOSINOPHIL NFR BLD AUTO: 0.5 %
ERYTHROCYTE [DISTWIDTH] IN BLOOD BY AUTOMATED COUNT: 12.7 % (ref 10–15)
GFR SERPL CREATININE-BSD FRML MDRD: 67 ML/MIN/{1.73_M2}
GLUCOSE SERPL-MCNC: 114 MG/DL (ref 70–99)
HCT VFR BLD AUTO: 45.6 % (ref 35–47)
HGB BLD-MCNC: 15.3 G/DL (ref 11.7–15.7)
LYMPHOCYTES # BLD AUTO: 0.8 10E9/L (ref 0.8–5.3)
LYMPHOCYTES NFR BLD AUTO: 11.4 %
MCH RBC QN AUTO: 35.3 PG (ref 26.5–33)
MCHC RBC AUTO-ENTMCNC: 33.6 G/DL (ref 31.5–36.5)
MCV RBC AUTO: 105 FL (ref 78–100)
MONOCYTES # BLD AUTO: 0.6 10E9/L (ref 0–1.3)
MONOCYTES NFR BLD AUTO: 8.2 %
NEUTROPHILS # BLD AUTO: 5.8 10E9/L (ref 1.6–8.3)
NEUTROPHILS NFR BLD AUTO: 79.5 %
PLATELET # BLD AUTO: 154 10E9/L (ref 150–450)
POTASSIUM SERPL-SCNC: 4.3 MMOL/L (ref 3.4–5.3)
PROT SERPL-MCNC: 8 G/DL (ref 6.8–8.8)
RBC # BLD AUTO: 4.33 10E12/L (ref 3.8–5.2)
SODIUM SERPL-SCNC: 135 MMOL/L (ref 133–144)
WBC # BLD AUTO: 7.3 10E9/L (ref 4–11)

## 2020-03-20 PROCEDURE — 36415 COLL VENOUS BLD VENIPUNCTURE: CPT | Performed by: INTERNAL MEDICINE

## 2020-03-20 PROCEDURE — 99213 OFFICE O/P EST LOW 20 MIN: CPT | Performed by: PHYSICIAN ASSISTANT

## 2020-03-20 PROCEDURE — 82784 ASSAY IGA/IGD/IGG/IGM EACH: CPT | Performed by: INTERNAL MEDICINE

## 2020-03-20 PROCEDURE — 85025 COMPLETE CBC W/AUTO DIFF WBC: CPT | Performed by: INTERNAL MEDICINE

## 2020-03-20 PROCEDURE — 80053 COMPREHEN METABOLIC PANEL: CPT | Performed by: INTERNAL MEDICINE

## 2020-03-20 ASSESSMENT — MIFFLIN-ST. JEOR: SCORE: 1068.03

## 2020-03-20 ASSESSMENT — PAIN SCALES - GENERAL: PAINLEVEL: MILD PAIN (3)

## 2020-03-20 NOTE — PROGRESS NOTES
SUBJECTIVE:                                                    Amelia Michel is a 59 year old female who presents to clinic today for the following health issues:    Chief Complaint   Patient presents with     Cellulitis     Wound was getting  Better then this cyst appeared and it is pushing  Not painful  Not seeming infected  No fevers/chills throughout      Problem list and histories reviewed & adjusted, as indicated.  Additional history:     Patient Active Problem List   Diagnosis     HTN (hypertension)     Primary pulmonary hypertension (H)     Hyperlipidemia LDL goal <130     RA (rheumatoid arthritis) (H)     Lymphedema of both lower extremities     Atrial tachycardia (H)     Cardiogenic shock (H)     Critical illness myopathy     Diffuse large B-cell lymphoma of extranodal site (H)     Diffuse large B cell lymphoma (H)     Febrile neutropenia (H)     Physical deconditioning     DLBCL (diffuse large B cell lymphoma) (H)     H/O cardiac arrest     Paroxysmal atrial fibrillation (H)     Atrial flutter, unspecified type (H)     Cervical cancer screening     Mild protein-calorie malnutrition (H)     Embolism and thrombosis of unspecified artery (H)     Thrombocytopenia, unspecified (H)     Heart palpitations     Bradycardia     Past Surgical History:   Procedure Laterality Date     ANESTHESIA CARDIOVERSION N/A 5/17/2017    Procedure: ANESTHESIA CARDIOVERSION;  ANESTHESIA CARDIOVERSION;  Surgeon: GENERIC ANESTHESIA PROVIDER;  Location: [facility]     ANESTHESIA CARDIOVERSION  3/12/2018    Procedure: ANESTHESIA CARDIOVERSION;;  Surgeon: GENERIC ANESTHESIA PROVIDER;  Location: [facility]     ANESTHESIA CARDIOVERSION N/A 3/23/2018    Procedure: ANESTHESIA CARDIOVERSION;  Anesthesia Cardioversion ;  Surgeon: GENERIC ANESTHESIA PROVIDER;  Location: [facility]     ANESTHESIA CARDIOVERSION N/A 4/12/2018    Procedure: ANESTHESIA CARDIOVERSION;  Cardioversion;  Surgeon: GENERIC ANESTHESIA PROVIDER;  Location: [facility] OR     ARTHRODESIS WRIST  " 2000    Right wrist     BONE MARROW ASPIRATE &BIOPSY  7/12/2017          COLONOSCOPY N/A 11/19/2019    Procedure: Colonoscopy, With Polypectomy And Biopsy;  Surgeon: Pj Vasques MD;  Location: [facility]     EP PACEMAKER N/A 12/13/2019    Procedure: EP Pacemaker;  Surgeon: Pj Trent MD;  Location:  [facility]     EXCISE LESION UPPER EXTREMITY Left 5/22/2018    Procedure: EXCISE LESION UPPER EXTREMITY;  Left Arm Wound Excision And Closure, Possible Submuscular Transposition of Ulnar Nerve  (Choice Anesthesia);  Surgeon: Kevin Sheldon MD;  Location:  OR     FOOT SURGERY      4 left and 2 right     RELEASE CARPAL TUNNEL BILATERAL       REPAIR VENTRICULAR SEPTAL DEFECT  1969     TRANSPOSITION ULNAR NERVE (ELBOW) Left 5/22/2018    Procedure: TRANSPOSITION ULNAR NERVE (ELBOW);;  Surgeon: Kevin Sheldon MD;  Location:  OR       Social History     Tobacco Use     Smoking status: Never Smoker     Smokeless tobacco: Never Used   Substance Use Topics     Alcohol use: Yes     Comment: none     Family History   Problem Relation Age of Onset     Breast Cancer Mother      Hypertension Mother      Alzheimer Disease Mother      Cerebrovascular Disease Mother      Hypertension Father      Cerebrovascular Disease Father      Atrial fibrillation Father      Lymphoma Father      Prostate Cancer Father      Diabetes Paternal Grandmother      Alzheimer Disease Maternal Grandmother         likely     Arthritis Maternal Grandfather      Pneumonia Maternal Grandfather            ROS:  Other than noted above, general, HEENT, respiratory, cardiac, MS, and gastrointestinal systems are negative.     OBJECTIVE:                                                    /72   Pulse 76   Temp 97.9  F (36.6  C) (Tympanic)   Ht 1.473 m (4' 10\")   Wt 60.3 kg (133 lb)   SpO2 99%   BMI 27.80 kg/m   Body mass index is 27.8 kg/m .   GENERAL APPEARANCE: healthy, alert and no distress  MS:  No lower extremity edema. No " tenderness, erythema or streaking or warmth or discharge.  Right ankle at lateral malleolus shows <1cm nontender scabbed wound with clear bandage (from wound care). No spreading erythema or edema.   just distal to lateral malleolus shows discrete nontender cyst like structure ~2inches wide. Has lumpy fatty feel not smooth cyst-like  Limited ROM ankle, chronic     ASSESSMENT/PLAN:                                                      ASSESSMENT/PLAN:      ICD-10-CM    1. Rheumatoid arthritis involving both shoulders with positive rheumatoid factor (H)  M05.711     M05.712    2. Lymphedema of both lower extremities  I89.0    3. Essential hypertension  I10    4. Diffuse large B-cell lymphoma, unspecified body region (H)  C83.30    5. Bone cyst of ankle  M85.679 Orthopedic [facility] Spine  Referral   6. Lipoma of skin and subcutaneous tissue  D17.30 Orthopedic & Spine  Referral   7. Diffuse large B-cell lymphoma of extranodal site (H)  C83.39 IgG     Comprehensive metabolic panel     CBC with platelets differential     Patient wears thigh high compression due to lymphedema and has custom brace due to RA/neuropathy. Patient has abrasion/wound that has not healed for quite some time (discussed at physical Oct 2019), but was better after she saw wound care in hospital Dec 2019 (new fitted brace, new dressings for protection). However since then she has had this cyst appear which adds pressure in the area. No signs of infection. Rheumatology does not think related to rheum/bursa issues. Patient defers x-ray today. Has feeling of possible lipoma/fatty tissue tumor. Recommended follow up with foot/ankle surgery for evaluation/possible removal    Patient Instructions   See Dr. Sabas Mcfarland [facility] [place] - podiatry/foot/ankle surgery  Continue using wound care  Call or follow up if any signs of infection or worsening    Gala Stringer PA-C   [facility]

## 2020-03-20 NOTE — TELEPHONE ENCOUNTER
Neuropathy issues right ankle had sore in October  She still has the sore  In Rheum clinic she noticed some swelling in that area, she said not a normal rheum nodule/bursa, wanted it evaluated. Possible cellulitis.  She is unable to take a picture of the area herself due to positioning.  She needs labs today per Dr. Rodriguez. She is coming in to clinic.  She is not ill, we discussed my concerns with her immune status.   She states she is coming into lab anyway and would like a visit. We will take proper precautions when she arrives.  Gala Stringer PA-C

## 2020-03-20 NOTE — TELEPHONE ENCOUNTER
Reason for Call:  Other call back    Detailed comments: Amelia requesting to speak with RN.  Long complicated issue she states.  No other information given. Please call and assess. Thank you..Kellee Taylor    Phone Number Patient can be reached at: Home number on file [phone] (home)    Best Time: any time    Can we leave a detailed message on this number? YES    Call taken on 3/20/2020 at 10:46 AM by Kellee Taylor

## 2020-03-20 NOTE — TELEPHONE ENCOUNTER
Patient is asking for lab appointment and clinic visit with BHAVESH Stringer.  Call transferred to provider to address as patient is immune compromised.  Trupti Elliott RN

## 2020-03-20 NOTE — PATIENT INSTRUCTIONS
See Dr. Sabas Mcfarland [facility] [place] - podiatry/foot/ankle surgery  Continue using wound care  Call or follow up if any signs of infection or worsening

## 2020-03-21 RX ORDER — ATENOLOL 25 MG/1
TABLET ORAL
Qty: 180 TABLET | Refills: 3 | Status: SHIPPED | OUTPATIENT
Start: 2020-03-21 | End: 2021-03-09

## 2020-03-23 LAB — IGG SERPL-MCNC: 1332 MG/DL (ref 610–1616)

## 2020-04-01 ENCOUNTER — TELEPHONE (OUTPATIENT)
Dept: CARDIOLOGY | Facility: CLINIC | Age: 60
End: 2020-04-01

## 2020-04-01 NOTE — TELEPHONE ENCOUNTER
Spoke to patient and let her know that we will get her Pacemaker remote transmission automatically as long her monitor is plugged in properly and if for some reason we do not get it, we will call her and walk her through the process.

## 2020-04-08 ENCOUNTER — ANCILLARY PROCEDURE (OUTPATIENT)
Dept: CARDIOLOGY | Facility: CLINIC | Age: 60
End: 2020-04-08
Attending: INTERNAL MEDICINE
Payer: COMMERCIAL

## 2020-04-08 DIAGNOSIS — R00.1 BRADYCARDIA: ICD-10-CM

## 2020-04-08 DIAGNOSIS — I48.92 ATRIAL FLUTTER, UNSPECIFIED TYPE (H): ICD-10-CM

## 2020-04-08 PROCEDURE — 93296 REM INTERROG EVL PM/IDS: CPT | Mod: ZF

## 2020-04-08 PROCEDURE — 93294 REM INTERROG EVL PM/LDLS PM: CPT | Mod: ZP | Performed by: INTERNAL MEDICINE

## 2020-04-10 ENCOUNTER — VIRTUAL VISIT (OUTPATIENT)
Dept: CARDIOLOGY | Facility: CLINIC | Age: 60
End: 2020-04-10
Attending: INTERNAL MEDICINE
Payer: COMMERCIAL

## 2020-04-10 DIAGNOSIS — R00.1 BRADYCARDIA: ICD-10-CM

## 2020-04-10 DIAGNOSIS — I48.92 ATRIAL FLUTTER, UNSPECIFIED TYPE (H): ICD-10-CM

## 2020-04-10 DIAGNOSIS — I47.19 ATRIAL TACHYCARDIA (H): ICD-10-CM

## 2020-04-10 DIAGNOSIS — Q21.0 VSD (VENTRICULAR SEPTAL DEFECT): Primary | ICD-10-CM

## 2020-04-10 PROCEDURE — 99214 OFFICE O/P EST MOD 30 MIN: CPT | Mod: 95 | Performed by: INTERNAL MEDICINE

## 2020-04-10 RX ORDER — DIGOXIN 125 MCG
125 TABLET ORAL DAILY
Qty: 90 TABLET | Refills: 3 | Status: SHIPPED | OUTPATIENT
Start: 2020-04-10 | End: 2020-10-09

## 2020-04-10 RX ORDER — DIGOXIN 125 MCG
TABLET ORAL
Qty: 90 TABLET | Refills: 0 | OUTPATIENT
Start: 2020-04-10

## 2020-04-10 NOTE — PROGRESS NOTES
"Electrophysiology Clinic Video Virtual Visit    Ms. Michel is a 59 year old female who is being evaluated via a billable video visit.      The patient has been notified of following:     \"This video visit will be conducted via a call between you and your physician/provider. We have found that certain health care needs can be provided without the need for an in-person physical exam.  This service lets us provide the care you need with a video conversation.  If a prescription is necessary we can send it directly to your pharmacy.  If lab work is needed we can place an order for that and you can then stop by our lab to have the test done at a later time.    If during the course of the call the physician/provider feels a video visit is not appropriate, you will not be charged for this service.\"     Physician/provider has received verbal consent for a Video Visit from the patient? Yes    Send video visit link to: [email or web address]         HPI:   Ms. Michel is a 59 year old female who has a past medical history significant for VSD s/p repair 1969, RA, HTN, insomnia, atrial tachyarrhythmias, cardiac arrest, SSS s/p PPM 12/2019,  DLBCL, and exudative pericardial effusion.      She was admitted from 5/15/17-5/23/17 with atrial tachyarrhythmias (SVT, AF, AFL) with symptoms of palpitations, fatigue, and nausea. An echo showed LVEF 40-45%, mildly reduced RVEF, moderate biatrial enlargement, mild mitral regurgitation, and moderate tricuspid regurgitation. . She was started on Eliquis and underwent a BRE/DCCV on 5/17/17 c/b hypotension and recurrent arrhythmia.She was started on Sotalol and chemically cardioverted. However, she had nausea and thought it was from the Sotalol so this was stopped. She reverted back to AF/AFL and a rate control strategy was adopted which was difficult given symptoms so she started amiodarone. CMRI  showed LVEF 55%, mildly dilated RV with focal area of dyskinesis in RVOT, severe bi-atrial enlargement, " "severe TR, mild/moderate MR, and DE at RV septal insertion site. RFA was discussed but was defered as patient had a UTI at the time with ESBL.     She was readmitted on 6/19/17-8/4/17 after suffering an out of hospital cardiac arrest.  Upon EMS arrival, she was in VF. She was externally defibrillated and had ROSC in the field. She was intubated and brought to [facility] found to be in escape rhythm 43 bpm. She was taken emergently to cath lab where coronary angiogram showed normal coronary arteries. Temporary pacemaker was placed in RA given sinus arrest. She was also placed on therapeutic hypothermia. She had been having nausea, diarrhea, and intermittent vomiting over the last week and generally had not been feeling well over the last month and also had saddle anesthesia with lower extremity numbness that had been evaluated by neuro as an outpatient.  Ultimately found to have DLBCL started on chemo cycles. 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     Her DLBCL was treated with R-EPOCH for one cycle while in the hospital complicated by cytopenias followed by 5 cycles of R-CHOP finished the cycles in December of 2017 currently on surveillance scans. She had a pericardial effusion for which she is on colchicine which was discontinued at the end of 6/2018.      She has then followed intermittently with EP in clinic. She had some recurrent AF in 4/2018 requiring ED visit with DCCV with recurrence and another DCCV. We restarted digoxin 4/4/18 after which she had one episode of AF s/p DCCV and has since maintained sinus rhythm and reported good symptomatic control. She was seen in 5/2018 and reported having a feeling of her heart \"rolling\" daily lasting about about 10 sec at the most. The last time she required DCCV was in 4/12/2018. Her cMRI had shown some organization of her [ericardial effusion) and she has been " initiated on colchicine. Chest CT in 9/2018 showed no evidence of lymphoma recurrence and improved pericardial effusion. Last EP follow up was in 2/2019 and she was doing well without issues.    She then presented 12/12/2019 with 1 day history palpitations, headaches, and nausea. She also endorses some dizziness when walking but not at rest. She was found to be bradycardic with intermittent junctional rhythm into 30's. Electrolytes and renal function stable. Her atenolol and digoxin were held. She continued to have junctional/bradycardia and decision was made to pursue PPM.    She presents today for follow up. She reports feeling well. She states she does have intermittent episodes of palpitations, that have not been overly bothersome to her. Device site well healed. She denies any chest pain/pressures, dizziness, lightheadedness, worsening shortness of breath, leg/ankle swelling, PND, orthopnea, or syncopal symptoms. Device site is reported well healed. Device transmission shows presenting rhythm is AP/VS @ 60 bpm. 1 NSVT, 6 Fast A&V and 272 AF episodes recorded. All EGMs show AF/AT with RVR. AF burden= 75.3%. She is taking eliquis. Normal device function. AP= 26% and = 13%. No short V-V intervals recorded. Lead trends appear stable. Battery estimates 14.2 years to OZZY. Current cardiac medications include: Elqiuis, Atenolol, Digoxin, Lasix, and Spironolactone.     PAST MEDICAL HISTORY:  Past Medical History:   Diagnosis Date     Arthritis      Cardiac arrest (H)      History of chemotherapy      Hypertension      Neuropathy        CURRENT MEDICATIONS:  Current Outpatient Medications   Medication Sig Dispense Refill     acetaminophen (TYLENOL) 500 MG tablet Take 500 mg by mouth every 6 hours as needed for mild pain       alendronate (FOSAMAX) 70 MG tablet Take 70 mg by mouth every 7 days On Monday       apixaban ANTICOAGULANT (ELIQUIS ANTICOAGULANT) 5 MG tablet Take 1 tablet (5 mg) by mouth 2 times daily 180  tablet 3     atenolol (TENORMIN) 25 MG tablet TAKE 1 TABLET BY MOUTH TWICE DAILY 180 tablet 3     calcium carbonate 600 mg-vitamin D 400 units (CALTRATE) 600-400 MG-UNIT per tablet Take 2 tablets by mouth daily       digoxin (LANOXIN) 125 MCG tablet Take 1 tablet (125 mcg) by mouth daily 90 tablet 0     furosemide (LASIX) 20 MG tablet Take 1 tablet (20 mg) by mouth daily 90 tablet 3     gabapentin (NEURONTIN) 100 MG capsule Take 200 mg by mouth 3 times daily       hydroxychloroquine (PLAQUENIL) 200 MG tablet Take 1 tablet (200 mg) by mouth daily 90 tablet 3     magnesium 250 MG tablet Take 1 tablet by mouth At Bedtime       multivitamin, therapeutic with minerals (THERA-VIT-M) TABS tablet Take 1 tablet by mouth daily 30 each 0     predniSONE (DELTASONE) 5 MG tablet Take 1 tablet (5 mg) by mouth daily 90 tablet 3     spironolactone (ALDACTONE) 25 MG tablet Take 25 mg by mouth daily       tolterodine ER (DETROL LA) 4 MG 24 hr capsule Take 1 capsule (4 mg) by mouth daily 90 capsule 3       PAST SURGICAL HISTORY:  Past Surgical History:   Procedure Laterality Date     ANESTHESIA CARDIOVERSION N/A 5/17/2017    Procedure: ANESTHESIA CARDIOVERSION;  ANESTHESIA CARDIOVERSION;  Surgeon: GENERIC ANESTHESIA PROVIDER;  Location: [facility]     ANESTHESIA CARDIOVERSION  3/12/2018    Procedure: ANESTHESIA CARDIOVERSION;;  Surgeon: GENERIC ANESTHESIA PROVIDER;  Location: [facility]     ANESTHESIA CARDIOVERSION N/A 3/23/2018    Procedure: ANESTHESIA CARDIOVERSION;  Anesthesia Cardioversion ;  Surgeon: GENERIC ANESTHESIA PROVIDER;  Location: [facility]     ANESTHESIA CARDIOVERSION N/A 4/12/2018    Procedure: ANESTHESIA CARDIOVERSION;  Cardioversion;  Surgeon: GENERIC ANESTHESIA PROVIDER;  Location: [facility]     ARTHRODESIS WRIST  2000    Right wrist     BONE MARROW ASPIRATE &BIOPSY  7/12/2017          COLONOSCOPY N/A 11/19/2019    Procedure: Colonoscopy, With Polypectomy And Biopsy;  Surgeon: Pj Vasques MD;  Location: [facility]     EP PACEMAKER N/A  12/13/2019    Procedure: EP Pacemaker;  Surgeon: Pj Trent MD;  Location:  [facility]     EXCISE LESION UPPER EXTREMITY Left 5/22/2018    Procedure: EXCISE LESION UPPER EXTREMITY;  Left Arm Wound Excision And Closure, Possible Submuscular Transposition of Ulnar Nerve  (Choice Anesthesia);  Surgeon: Kevin Sheldon MD;  [facility]     FOOT SURGERY      4 left and 2 right     RELEASE CARPAL TUNNEL BILATERAL       REPAIR VENTRICULAR SEPTAL DEFECT  1969     TRANSPOSITION ULNAR NERVE (ELBOW) Left 5/22/2018    Procedure: TRANSPOSITION ULNAR NERVE (ELBOW);;  Surgeon: Kevin Sheldon MD;  Location:  OR       ALLERGIES:     Allergies   Allergen Reactions     Blood Transfusion Related (Informational Only) Hives     Hives with platelets. Give benadryl premedication.     Metoprolol Other (See Comments)     Pt and  report that metoprolol does not work for her and also reports feeling unwell with this medication. She has been able to tolerate atenolol, which as worked in controlling her HR.      No Clinical Screening - See Comments      Penicillin Allergy Skin Test not performed, see antimicrobial management team progress note 7/5/17.       Penicillins      Tape [Adhesive Tape] Rash       FAMILY HISTORY:  Family History   Problem Relation Age of Onset     Breast Cancer Mother      Hypertension Mother      Alzheimer Disease Mother      Cerebrovascular Disease Mother      Hypertension Father      Cerebrovascular Disease Father      Atrial fibrillation Father      Lymphoma Father      Prostate Cancer Father      Diabetes Paternal Grandmother      Alzheimer Disease Maternal Grandmother         likely     Arthritis Maternal Grandfather      Pneumonia Maternal Grandfather        SOCIAL HISTORY:  Social History     Tobacco Use     Smoking status: Never Smoker     Smokeless tobacco: Never Used   Substance Use Topics     Alcohol use: Yes     Comment: none     Drug use: No       ROS:  10 point ROS neg  other than the symptoms noted above in the HPI.    Exam:  CONSITUTIONAL: no acute distress  HEENT: no icterus  CV: no visible edema of visualized upper extremities. No JVD.   RESPIRATORY: respirations nonlabored  NEURO: AA&Ox3, speech fluent/appropriate, motor grossly nonfocal  PSYCH: cooperative, affect appropriate  DERM: no rashes on visualized face/neck/upper extremities  Labs:  Reviewed.     Testing/Procedures:  9/11/17 ECHOCARDIOGRAM:   Interpretation Summary  Left ventricular function, chamber size, wall motion, and wall thickness are  normal.The EF is 60-65% (traced 60%.) GLS -18%.  The right ventricle is normal size and normal in function. The RV is mildly  dilated.  Moderate tricuspid insufficiency is present.  Mild pulmonary hypertension is present (esimated PASP 55 mmHg including RA  pressure.)  Dilation of the inferior vena cava is present with abnormal respiratory  variation in diameter (esitimated RAP 15 mmHg.)  This study was compared with the study from 8/31/17: TR appears slightly  decreased from the earlier study.     7/24/17 CMRI:     1. The LV is normal in cavity size and wall thickness. The global systolic function is normal. The LVEF is  64%. There are no regional wall motion abnormalities. No evidence of myocardial iron overload.      2. The RV is normal in cavity size. The global systolic function is normal. The RVEF is 59%.      3. There is moderate dilation of bilateral atria.      4. Tricuspid regurgitation is present, difficult to quantify due to image quality.      5. Late gadolinium enhancement imaging shows RV insertion site hyperenhancement, a non-specific finding  seen in patients with RV volume/pressure overload.      6. There is a moderate right pleural effusion and small left pleural effusion.      CONCLUSIONS: Normal Bi-Ventricular systolic function, LVEF 64% and RVE 59%. There is no evidence of  infiltrative disease. Compared to the MRI from 5/15/2017, there is no significant  change.      7/2017 BRE:  Interpretation Summary  There is a small mass (0.7 cm by 0.2 cm) on the ventricular side of the right  coronary cusp. There is a second mass (0.4 cm by 0.2 cm) observed in the LVOT,  attached to the mitro-aortic curtain. There is a third mass on the noncoronary  cusp that measures 0.4 cm by 0.2 cm that could be a healing vegetation. These  findings are compatible with endocarditis if clinical picture supports.  Right ventricular function, chamber size, wall motion, and thickness are  normal.  This study was compared with the study from 7/10/2017.  There is detection of masses on the aortic valve and LVOT.     4/12/18 CMRI:  1. The LV is normal in cavity size and wall thickness. The global systolic function is normal. The LVEF is  54%. There are no regional wall motion abnormalities.     2. The RV is mildly dilated in cavity size. The global systolic function is normal. The RVEF is 49%.      3. Both atria are severely enlarged.     4. There is at least moderate, possibly severe tricuspid regurgitation.      5. Late gadolinium enhancement imaging shows hyperenhancement in the RV insertion site consistent with RV  pressure/volume overload.      6. There is a loculated pericardial effusion along the basal lateral and basal to mid inferior LV walls,  and along the inferior RV wall. it appears exudative in nature, and is beginning to organize. There is  diffuse pericardial enhancement.     7. There is no intracardiac thrombus or mass.     8. There is a small right pleural effusion.     9/26/18 CHEST CT:                                                                   IMPRESSION: In this patient with a history of lymphoma:  1. No evidence of disease recurrence, consistent with complete  response per Lugano criteria.  2. Stable cardiomegaly. Improved pericardial effusion.  3. Early contrast phase with heterogeneous visceral enhancement in the  abdomen, likely secondary to cardiac  dysfunction.      Assessment and Plan:   Ms. Michel is a 59 year old female who has a past medical history significant for VSD s/p repair 1969, RA, HTN, insomnia, atrial tachyarrhythmias, cardiac arrest, SSS s/p PPM 12/2019,  DLBCL, and exudative pericardial effusion. She presents today for follow up. She reports feeling well. She states she does have intermittent episodes of palpitations, that have not been overly bothersome to her. Device site well healed. She denies any chest pain/pressures, dizziness, lightheadedness, worsening shortness of breath, leg/ankle swelling, PND, orthopnea, or syncopal symptoms. Device site is reported well healed. Device transmission shows presenting rhythm is AP/VS @ 60 bpm. 1 NSVT, 6 Fast A&V and 272 AF episodes recorded. All EGMs show AF/AT with RVR. AF burden= 75.3%. She is taking eliquis. Normal device function. AP= 26% and = 13%. No short V-V intervals recorded. Lead trends appear stable. Battery estimates 14.2 years to OZZY. Current cardiac medications include: Elqiuis, Atenolol, Digoxin, Lasix, and Spironolactone.     Sick Sinus Syndrome:   Atrial Fibrillation:  -. Stroke Prophylaxis:  CHADSVASC=+HTN, +gender  2, corresponding to a 2.2% annual stroke / systemic emolism event rate. indicating need for long term oral anticoagulation.  She is on Eliquis. No bleeding issues.   2. Rate Control: Continue Atenolol and Digoxin.    3. Rhythm Control: Cardioversion, Antiarrhythmics and/or ablation are options for rhythm control. She has had several DCCV last in 4/12/19. Notably, she had possible side effects from Sotalol (however, likely was due to underlying illness at the time and not from medication since she was found to have DBCL).  If she continues to have frequent symptomatic episodes of AF/AFl, given her history of adverse reactions to AAT, she may be a candidate for ablation. Will not change treatment at this point as she is only having mild symptoms. Will need to follow LVEF,  if concern for any tachy-mediated issues then would need to consider treatment.   4. Had tachy-shelton syndrome and underwent PPM implant in 12/2019. Device site well healed. Normal device function with stable lead parameters. She will have continued follow up with Device clinic per routine.      Exudative pericardial effusion:  - last noted in cMRI 4/12/18 beginning to organize with diffuse pericardial enhancement. Follow up chest CT showed improved pericardial effusion. Asymptomatic with no shortness of breath or signs or symptoms of constriction.   - she finished a course of colchicine in June  - stable on chest CT in 3/2019    Follow up in 6 months with echo prior to visit.      Video-Visit Details    Type of service:  Video Visit    Video Visit Duration: 22 minutes.     Originating Location (pt. Location): Home    Distant Location (provider location):  [facility]     Mode of Communication:  Video Conference via Gradient X    I have reviewed the note as documented above.  This accurately captures the substance of my virtual visit with the patient. The patient states understanding and is agreeable with the plan.     Juan Eaton MD [facility]  Cardiology - Electrophysiology        CC  DRAKE MORGAN

## 2020-04-10 NOTE — NURSING NOTE
Chief Complaint   Patient presents with     Follow Up     follow up     Allergies and medications reconciled.     Aurelio Bearden CMA  3:26 PM

## 2020-04-11 LAB
MDC_IDC_EPISODE_DTM: NORMAL
MDC_IDC_EPISODE_DURATION: 1 S
MDC_IDC_EPISODE_DURATION: 1147 S
MDC_IDC_EPISODE_DURATION: 1181 S
MDC_IDC_EPISODE_DURATION: 121 S
MDC_IDC_EPISODE_DURATION: 13 S
MDC_IDC_EPISODE_DURATION: 14 S
MDC_IDC_EPISODE_DURATION: 1722 S
MDC_IDC_EPISODE_DURATION: 180 S
MDC_IDC_EPISODE_DURATION: 180 S
MDC_IDC_EPISODE_DURATION: 181 S
MDC_IDC_EPISODE_DURATION: 184 S
MDC_IDC_EPISODE_DURATION: 19 S
MDC_IDC_EPISODE_DURATION: 20 S
MDC_IDC_EPISODE_DURATION: 209 S
MDC_IDC_EPISODE_DURATION: 21 S
MDC_IDC_EPISODE_DURATION: 227 S
MDC_IDC_EPISODE_DURATION: 2272 S
MDC_IDC_EPISODE_DURATION: 237 S
MDC_IDC_EPISODE_DURATION: 2621 S
MDC_IDC_EPISODE_DURATION: 2645 S
MDC_IDC_EPISODE_DURATION: 2660 S
MDC_IDC_EPISODE_DURATION: 2721 S
MDC_IDC_EPISODE_DURATION: 282 S
MDC_IDC_EPISODE_DURATION: 317 S
MDC_IDC_EPISODE_DURATION: 329 S
MDC_IDC_EPISODE_DURATION: 3798 S
MDC_IDC_EPISODE_DURATION: 395 S
MDC_IDC_EPISODE_DURATION: 442 S
MDC_IDC_EPISODE_DURATION: 4983 S
MDC_IDC_EPISODE_DURATION: 5346 S
MDC_IDC_EPISODE_DURATION: 577 S
MDC_IDC_EPISODE_DURATION: 5920 S
MDC_IDC_EPISODE_DURATION: 593 S
MDC_IDC_EPISODE_DURATION: 6201 S
MDC_IDC_EPISODE_DURATION: 626 S
MDC_IDC_EPISODE_DURATION: 664 S
MDC_IDC_EPISODE_DURATION: 672 S
MDC_IDC_EPISODE_DURATION: 796 S
MDC_IDC_EPISODE_DURATION: 8278 S
MDC_IDC_EPISODE_DURATION: 9 S
MDC_IDC_EPISODE_DURATION: 9 S
MDC_IDC_EPISODE_DURATION: NORMAL S
MDC_IDC_EPISODE_ID: 257
MDC_IDC_EPISODE_ID: 258
MDC_IDC_EPISODE_ID: 259
MDC_IDC_EPISODE_ID: 260
MDC_IDC_EPISODE_ID: 261
MDC_IDC_EPISODE_ID: 262
MDC_IDC_EPISODE_ID: 263
MDC_IDC_EPISODE_ID: 264
MDC_IDC_EPISODE_ID: 265
MDC_IDC_EPISODE_ID: 266
MDC_IDC_EPISODE_ID: 267
MDC_IDC_EPISODE_ID: 268
MDC_IDC_EPISODE_ID: 269
MDC_IDC_EPISODE_ID: 270
MDC_IDC_EPISODE_ID: 271
MDC_IDC_EPISODE_ID: 272
MDC_IDC_EPISODE_ID: 273
MDC_IDC_EPISODE_ID: 274
MDC_IDC_EPISODE_ID: 275
MDC_IDC_EPISODE_ID: 276
MDC_IDC_EPISODE_ID: 277
MDC_IDC_EPISODE_ID: 278
MDC_IDC_EPISODE_ID: 279
MDC_IDC_EPISODE_ID: 28
MDC_IDC_EPISODE_ID: 280
MDC_IDC_EPISODE_ID: 281
MDC_IDC_EPISODE_ID: 282
MDC_IDC_EPISODE_ID: 283
MDC_IDC_EPISODE_ID: 284
MDC_IDC_EPISODE_ID: 285
MDC_IDC_EPISODE_ID: 286
MDC_IDC_EPISODE_ID: 287
MDC_IDC_EPISODE_ID: 288
MDC_IDC_EPISODE_ID: 289
MDC_IDC_EPISODE_ID: 29
MDC_IDC_EPISODE_ID: 290
MDC_IDC_EPISODE_ID: 291
MDC_IDC_EPISODE_ID: 292
MDC_IDC_EPISODE_ID: 293
MDC_IDC_EPISODE_ID: 294
MDC_IDC_EPISODE_ID: 295
MDC_IDC_EPISODE_ID: 296
MDC_IDC_EPISODE_ID: 297
MDC_IDC_EPISODE_ID: 298
MDC_IDC_EPISODE_ID: 299
MDC_IDC_EPISODE_ID: 30
MDC_IDC_EPISODE_ID: 300
MDC_IDC_EPISODE_ID: 301
MDC_IDC_EPISODE_ID: 302
MDC_IDC_EPISODE_ID: 303
MDC_IDC_EPISODE_ID: 304
MDC_IDC_EPISODE_ID: 305
MDC_IDC_EPISODE_ID: 306
MDC_IDC_EPISODE_ID: 31
MDC_IDC_EPISODE_ID: 32
MDC_IDC_EPISODE_ID: 33
MDC_IDC_EPISODE_ID: 34
MDC_IDC_EPISODE_TYPE: NORMAL
MDC_IDC_LEAD_IMPLANT_DT: NORMAL
MDC_IDC_LEAD_IMPLANT_DT: NORMAL
MDC_IDC_LEAD_LOCATION: NORMAL
MDC_IDC_LEAD_LOCATION: NORMAL
MDC_IDC_LEAD_LOCATION_DETAIL_1: NORMAL
MDC_IDC_LEAD_LOCATION_DETAIL_1: NORMAL
MDC_IDC_LEAD_MFG: NORMAL
MDC_IDC_LEAD_MFG: NORMAL
MDC_IDC_LEAD_MODEL: NORMAL
MDC_IDC_LEAD_MODEL: NORMAL
MDC_IDC_LEAD_POLARITY_TYPE: NORMAL
MDC_IDC_LEAD_POLARITY_TYPE: NORMAL
MDC_IDC_LEAD_SERIAL: NORMAL
MDC_IDC_LEAD_SERIAL: NORMAL
MDC_IDC_LEAD_SPECIAL_FUNCTION: NORMAL
MDC_IDC_LEAD_SPECIAL_FUNCTION: NORMAL
MDC_IDC_MSMT_BATTERY_DTM: NORMAL
MDC_IDC_MSMT_BATTERY_REMAINING_LONGEVITY: 172 MO
MDC_IDC_MSMT_BATTERY_RRT_TRIGGER: 2.62
MDC_IDC_MSMT_BATTERY_STATUS: NORMAL
MDC_IDC_MSMT_BATTERY_VOLTAGE: 3.15 V
MDC_IDC_MSMT_LEADCHNL_RA_IMPEDANCE_VALUE: 361 OHM
MDC_IDC_MSMT_LEADCHNL_RA_IMPEDANCE_VALUE: 437 OHM
MDC_IDC_MSMT_LEADCHNL_RA_PACING_THRESHOLD_AMPLITUDE: 0.38 V
MDC_IDC_MSMT_LEADCHNL_RA_PACING_THRESHOLD_PULSEWIDTH: 0.4 MS
MDC_IDC_MSMT_LEADCHNL_RA_SENSING_INTR_AMPL: 1.88 MV
MDC_IDC_MSMT_LEADCHNL_RA_SENSING_INTR_AMPL: 1.88 MV
MDC_IDC_MSMT_LEADCHNL_RV_IMPEDANCE_VALUE: 437 OHM
MDC_IDC_MSMT_LEADCHNL_RV_IMPEDANCE_VALUE: 513 OHM
MDC_IDC_MSMT_LEADCHNL_RV_PACING_THRESHOLD_AMPLITUDE: 0.62 V
MDC_IDC_MSMT_LEADCHNL_RV_PACING_THRESHOLD_PULSEWIDTH: 0.4 MS
MDC_IDC_MSMT_LEADCHNL_RV_SENSING_INTR_AMPL: 11.5 MV
MDC_IDC_MSMT_LEADCHNL_RV_SENSING_INTR_AMPL: 11.5 MV
MDC_IDC_PG_IMPLANT_DTM: NORMAL
MDC_IDC_PG_MFG: NORMAL
MDC_IDC_PG_MODEL: NORMAL
MDC_IDC_PG_SERIAL: NORMAL
MDC_IDC_PG_TYPE: NORMAL
MDC_IDC_SESS_CLINIC_NAME: NORMAL
MDC_IDC_SESS_DTM: NORMAL
MDC_IDC_SESS_TYPE: NORMAL
MDC_IDC_SET_BRADY_AT_MODE_SWITCH_RATE: 171 {BEATS}/MIN
MDC_IDC_SET_BRADY_HYSTRATE: NORMAL
MDC_IDC_SET_BRADY_LOWRATE: 60 {BEATS}/MIN
MDC_IDC_SET_BRADY_MAX_SENSOR_RATE: 130 {BEATS}/MIN
MDC_IDC_SET_BRADY_MAX_TRACKING_RATE: 130 {BEATS}/MIN
MDC_IDC_SET_BRADY_MODE: NORMAL
MDC_IDC_SET_BRADY_PAV_DELAY_LOW: 180 MS
MDC_IDC_SET_BRADY_SAV_DELAY_LOW: 150 MS
MDC_IDC_SET_LEADCHNL_RA_PACING_AMPLITUDE: 1.5 V
MDC_IDC_SET_LEADCHNL_RA_PACING_ANODE_ELECTRODE_1: NORMAL
MDC_IDC_SET_LEADCHNL_RA_PACING_ANODE_LOCATION_1: NORMAL
MDC_IDC_SET_LEADCHNL_RA_PACING_CAPTURE_MODE: NORMAL
MDC_IDC_SET_LEADCHNL_RA_PACING_CATHODE_ELECTRODE_1: NORMAL
MDC_IDC_SET_LEADCHNL_RA_PACING_CATHODE_LOCATION_1: NORMAL
MDC_IDC_SET_LEADCHNL_RA_PACING_POLARITY: NORMAL
MDC_IDC_SET_LEADCHNL_RA_PACING_PULSEWIDTH: 0.4 MS
MDC_IDC_SET_LEADCHNL_RA_SENSING_ANODE_ELECTRODE_1: NORMAL
MDC_IDC_SET_LEADCHNL_RA_SENSING_ANODE_LOCATION_1: NORMAL
MDC_IDC_SET_LEADCHNL_RA_SENSING_CATHODE_ELECTRODE_1: NORMAL
MDC_IDC_SET_LEADCHNL_RA_SENSING_CATHODE_LOCATION_1: NORMAL
MDC_IDC_SET_LEADCHNL_RA_SENSING_POLARITY: NORMAL
MDC_IDC_SET_LEADCHNL_RA_SENSING_SENSITIVITY: 0.3 MV
MDC_IDC_SET_LEADCHNL_RV_PACING_AMPLITUDE: 2 V
MDC_IDC_SET_LEADCHNL_RV_PACING_ANODE_ELECTRODE_1: NORMAL
MDC_IDC_SET_LEADCHNL_RV_PACING_ANODE_LOCATION_1: NORMAL
MDC_IDC_SET_LEADCHNL_RV_PACING_CAPTURE_MODE: NORMAL
MDC_IDC_SET_LEADCHNL_RV_PACING_CATHODE_ELECTRODE_1: NORMAL
MDC_IDC_SET_LEADCHNL_RV_PACING_CATHODE_LOCATION_1: NORMAL
MDC_IDC_SET_LEADCHNL_RV_PACING_POLARITY: NORMAL
MDC_IDC_SET_LEADCHNL_RV_PACING_PULSEWIDTH: 0.4 MS
MDC_IDC_SET_LEADCHNL_RV_SENSING_ANODE_ELECTRODE_1: NORMAL
MDC_IDC_SET_LEADCHNL_RV_SENSING_ANODE_LOCATION_1: NORMAL
MDC_IDC_SET_LEADCHNL_RV_SENSING_CATHODE_ELECTRODE_1: NORMAL
MDC_IDC_SET_LEADCHNL_RV_SENSING_CATHODE_LOCATION_1: NORMAL
MDC_IDC_SET_LEADCHNL_RV_SENSING_POLARITY: NORMAL
MDC_IDC_SET_LEADCHNL_RV_SENSING_SENSITIVITY: 0.9 MV
MDC_IDC_SET_ZONE_DETECTION_INTERVAL: 350 MS
MDC_IDC_SET_ZONE_DETECTION_INTERVAL: 400 MS
MDC_IDC_SET_ZONE_TYPE: NORMAL
MDC_IDC_STAT_BRADY_AP_VP_PERCENT: 2.89 %
MDC_IDC_STAT_BRADY_AP_VS_PERCENT: 91.16 %
MDC_IDC_STAT_BRADY_AS_VP_PERCENT: 0.18 %
MDC_IDC_STAT_BRADY_AS_VS_PERCENT: 5.88 %
MDC_IDC_STAT_BRADY_DTM_END: NORMAL
MDC_IDC_STAT_BRADY_DTM_START: NORMAL
MDC_IDC_STAT_BRADY_RA_PERCENT_PACED: 26.05 %
MDC_IDC_STAT_BRADY_RV_PERCENT_PACED: 13.15 %
MDC_IDC_STAT_EPISODE_RECENT_COUNT: 0
MDC_IDC_STAT_EPISODE_RECENT_COUNT: 1
MDC_IDC_STAT_EPISODE_RECENT_COUNT: 272
MDC_IDC_STAT_EPISODE_RECENT_COUNT_DTM_END: NORMAL
MDC_IDC_STAT_EPISODE_RECENT_COUNT_DTM_START: NORMAL
MDC_IDC_STAT_EPISODE_TOTAL_COUNT: 0
MDC_IDC_STAT_EPISODE_TOTAL_COUNT: 2
MDC_IDC_STAT_EPISODE_TOTAL_COUNT: 296
MDC_IDC_STAT_EPISODE_TOTAL_COUNT_DTM_END: NORMAL
MDC_IDC_STAT_EPISODE_TOTAL_COUNT_DTM_START: NORMAL
MDC_IDC_STAT_EPISODE_TYPE: NORMAL

## 2020-04-13 NOTE — PATIENT INSTRUCTIONS
You were seen today in the Adult Congenital and Cardiovascular Genetics Clinic at the [facility].    Cardiology Providers you saw during your visit:  Dr Juan Eaton    Diagnosis:  History of VSD s/p closure, A fib, sick sinus syndrome    Results:  The results of your device check was reviewed with you in clinic today    Recommendations:    1.  Continue to eat a heart healthy, low salt diet.  2.  Continue to get 20-30 minutes of aerobic activity, 4-5 days per week.  Examples of aerobic activity include walking, running, swimming, cycling, etc.  3.  Continue to observe good oral hygiene, with regular dental visits.    SBE prophylaxis:   Yes____  No__x__    Lifelong Bacterial Endocarditis Prophylaxis:  YES____  NO____    If YES is checked, follow the recommendations outlined below:   1. Take antibiotic(s) prior to recommended dental procedures and procedures on the respiratory tract or with infected skin, muscle or bones. SBE prophylaxis is not needed for routine GI and  procedures (ie. Colonoscopy or vaginal delivery)   2. Observe good oral hygiene daily, as advised by your dentist. Get regular professional dental care.   3. Keep cuts clean.   4. Infections should be treated promptly.   5. Symptoms of Infective Endocarditis could include: fever lasting more than 4-5 days or a recurrent fever that initially resolves but returns within 1-2 days)     Exercise restrictions:   Yes____  No__x__         If yes, list restrictions:  Must be allowed to rest if fatigued or SOB      Work restrictions:  Yes____  No__x__         If yes, list restrictions:    FASTING CHOLESTEROL was checked in the last 5 years YES___  NO_x__   Continue to eat a heart healthy, low salt diet.         ____ Fasting lipid panel order today         ____ No changes in medications          ____ I recommend the following changes in your cholesterol medications.:          ____ Please follow up for cholesterol screening at your primary care  physician      Follow-up:  Follow up with Dr Eaton in 6 months with an echocardiogram and device check    For after hours urgent needs, call [phone] and ask to speak to the Adult Congenital Physician on call.  Mention Job Code 0401.    For emergencies call [phone].    For any scheduling needs, please call Lesli Glaser Procedure , at [phone]  Thank you for your visit today!  If you have questions or concerns about today's visit, please call me.    Lionel Molina RN, BSN  Cardiology Care Coordinator  [facility]  [phone]    [address]  Mail Code [id number]  [place] [address]

## 2020-04-14 RX ORDER — GABAPENTIN 100 MG/1
CAPSULE ORAL
Qty: 180 CAPSULE | OUTPATIENT
Start: 2020-04-14

## 2020-04-14 NOTE — TELEPHONE ENCOUNTER
Routing refill request to provider for review/approval because:  Medication is reported/historical  Rx to be sent to [facility]  Rx sent back to pharmacy    Monalisa Pérez RN

## 2020-04-23 ENCOUNTER — MYC MEDICAL ADVICE (OUTPATIENT)
Dept: FAMILY MEDICINE | Facility: CLINIC | Age: 60
End: 2020-04-23

## 2020-04-23 DIAGNOSIS — M06.9 RHEUMATOID ARTHRITIS INVOLVING SHOULDER, UNSPECIFIED LATERALITY, UNSPECIFIED RHEUMATOID FACTOR PRESENCE: Primary | Chronic | ICD-10-CM

## 2020-04-23 RX ORDER — GABAPENTIN 100 MG/1
200 CAPSULE ORAL 3 TIMES DAILY
Qty: 540 CAPSULE | Refills: 3 | Status: SHIPPED | OUTPATIENT
Start: 2020-04-23 | End: 2021-04-16

## 2020-05-19 ENCOUNTER — MYC MEDICAL ADVICE (OUTPATIENT)
Dept: FAMILY MEDICINE | Facility: CLINIC | Age: 60
End: 2020-05-19

## 2020-05-19 DIAGNOSIS — R39.15 URINARY URGENCY: ICD-10-CM

## 2020-05-19 DIAGNOSIS — N39.41 URGE INCONTINENCE: ICD-10-CM

## 2020-05-19 RX ORDER — TOLTERODINE 4 MG/1
4 CAPSULE, EXTENDED RELEASE ORAL DAILY
Qty: 90 CAPSULE | Refills: 3 | Status: SHIPPED | OUTPATIENT
Start: 2020-05-19 | End: 2021-08-25

## 2020-06-19 DIAGNOSIS — Q21.0 VSD (VENTRICULAR SEPTAL DEFECT): Primary | ICD-10-CM

## 2020-07-06 ENCOUNTER — TELEPHONE (OUTPATIENT)
Dept: CARDIOLOGY | Facility: CLINIC | Age: 60
End: 2020-07-06

## 2020-07-10 ENCOUNTER — ANCILLARY PROCEDURE (OUTPATIENT)
Dept: CARDIOLOGY | Facility: CLINIC | Age: 60
End: 2020-07-10
Attending: INTERNAL MEDICINE
Payer: COMMERCIAL

## 2020-07-10 DIAGNOSIS — R00.1 BRADYCARDIA: ICD-10-CM

## 2020-07-10 PROCEDURE — 93296 REM INTERROG EVL PM/IDS: CPT | Mod: ZF

## 2020-07-10 PROCEDURE — 93294 REM INTERROG EVL PM/LDLS PM: CPT | Mod: ZP | Performed by: INTERNAL MEDICINE

## 2020-07-17 LAB
MDC_IDC_EPISODE_DTM: NORMAL
MDC_IDC_EPISODE_DURATION: 1 S
MDC_IDC_EPISODE_DURATION: 10 S
MDC_IDC_EPISODE_DURATION: 100 S
MDC_IDC_EPISODE_DURATION: 1027 S
MDC_IDC_EPISODE_DURATION: 1031 S
MDC_IDC_EPISODE_DURATION: 11 S
MDC_IDC_EPISODE_DURATION: 12 S
MDC_IDC_EPISODE_DURATION: 1313 S
MDC_IDC_EPISODE_DURATION: 142 S
MDC_IDC_EPISODE_DURATION: 143 S
MDC_IDC_EPISODE_DURATION: 15 S
MDC_IDC_EPISODE_DURATION: 18 S
MDC_IDC_EPISODE_DURATION: 180 S
MDC_IDC_EPISODE_DURATION: 181 S
MDC_IDC_EPISODE_DURATION: 182 S
MDC_IDC_EPISODE_DURATION: 183 S
MDC_IDC_EPISODE_DURATION: 185 S
MDC_IDC_EPISODE_DURATION: 1875 S
MDC_IDC_EPISODE_DURATION: 196 S
MDC_IDC_EPISODE_DURATION: 197 S
MDC_IDC_EPISODE_DURATION: 197 S
MDC_IDC_EPISODE_DURATION: 2 S
MDC_IDC_EPISODE_DURATION: 2051 S
MDC_IDC_EPISODE_DURATION: 217 S
MDC_IDC_EPISODE_DURATION: 23 S
MDC_IDC_EPISODE_DURATION: 25 S
MDC_IDC_EPISODE_DURATION: 2690 S
MDC_IDC_EPISODE_DURATION: 3 S
MDC_IDC_EPISODE_DURATION: 301 S
MDC_IDC_EPISODE_DURATION: 305 S
MDC_IDC_EPISODE_DURATION: 312 S
MDC_IDC_EPISODE_DURATION: 345 S
MDC_IDC_EPISODE_DURATION: 348 S
MDC_IDC_EPISODE_DURATION: 35 S
MDC_IDC_EPISODE_DURATION: 37 S
MDC_IDC_EPISODE_DURATION: 40 S
MDC_IDC_EPISODE_DURATION: 48 S
MDC_IDC_EPISODE_DURATION: 4843 S
MDC_IDC_EPISODE_DURATION: 5468 S
MDC_IDC_EPISODE_DURATION: 595 S
MDC_IDC_EPISODE_DURATION: 668 S
MDC_IDC_EPISODE_DURATION: 731 S
MDC_IDC_EPISODE_DURATION: 8088 S
MDC_IDC_EPISODE_DURATION: 8283 S
MDC_IDC_EPISODE_DURATION: 879 S
MDC_IDC_EPISODE_DURATION: 888 S
MDC_IDC_EPISODE_DURATION: 91 S
MDC_IDC_EPISODE_DURATION: NORMAL S
MDC_IDC_EPISODE_ID: 1135
MDC_IDC_EPISODE_ID: 1136
MDC_IDC_EPISODE_ID: 1137
MDC_IDC_EPISODE_ID: 1138
MDC_IDC_EPISODE_ID: 1139
MDC_IDC_EPISODE_ID: 1140
MDC_IDC_EPISODE_ID: 1141
MDC_IDC_EPISODE_ID: 1142
MDC_IDC_EPISODE_ID: 1143
MDC_IDC_EPISODE_ID: 1144
MDC_IDC_EPISODE_ID: 1145
MDC_IDC_EPISODE_ID: 1146
MDC_IDC_EPISODE_ID: 1147
MDC_IDC_EPISODE_ID: 1148
MDC_IDC_EPISODE_ID: 1149
MDC_IDC_EPISODE_ID: 1150
MDC_IDC_EPISODE_ID: 1151
MDC_IDC_EPISODE_ID: 1152
MDC_IDC_EPISODE_ID: 1153
MDC_IDC_EPISODE_ID: 1154
MDC_IDC_EPISODE_ID: 1155
MDC_IDC_EPISODE_ID: 1156
MDC_IDC_EPISODE_ID: 1157
MDC_IDC_EPISODE_ID: 1158
MDC_IDC_EPISODE_ID: 1159
MDC_IDC_EPISODE_ID: 1160
MDC_IDC_EPISODE_ID: 1161
MDC_IDC_EPISODE_ID: 1162
MDC_IDC_EPISODE_ID: 1163
MDC_IDC_EPISODE_ID: 1164
MDC_IDC_EPISODE_ID: 1165
MDC_IDC_EPISODE_ID: 1166
MDC_IDC_EPISODE_ID: 1167
MDC_IDC_EPISODE_ID: 1168
MDC_IDC_EPISODE_ID: 1169
MDC_IDC_EPISODE_ID: 1170
MDC_IDC_EPISODE_ID: 1171
MDC_IDC_EPISODE_ID: 1172
MDC_IDC_EPISODE_ID: 1173
MDC_IDC_EPISODE_ID: 1174
MDC_IDC_EPISODE_ID: 1175
MDC_IDC_EPISODE_ID: 1176
MDC_IDC_EPISODE_ID: 1177
MDC_IDC_EPISODE_ID: 1178
MDC_IDC_EPISODE_ID: 1179
MDC_IDC_EPISODE_ID: 1180
MDC_IDC_EPISODE_ID: 1181
MDC_IDC_EPISODE_ID: 1182
MDC_IDC_EPISODE_ID: 1183
MDC_IDC_EPISODE_ID: 1184
MDC_IDC_EPISODE_ID: 1185
MDC_IDC_EPISODE_ID: 1186
MDC_IDC_EPISODE_ID: 307
MDC_IDC_EPISODE_ID: 502
MDC_IDC_EPISODE_ID: 503
MDC_IDC_EPISODE_ID: 504
MDC_IDC_EPISODE_ID: 505
MDC_IDC_EPISODE_ID: 506
MDC_IDC_EPISODE_ID: 507
MDC_IDC_EPISODE_ID: 508
MDC_IDC_EPISODE_ID: 509
MDC_IDC_EPISODE_ID: 510
MDC_IDC_EPISODE_ID: 512
MDC_IDC_EPISODE_ID: 513
MDC_IDC_EPISODE_ID: 514
MDC_IDC_EPISODE_ID: 515
MDC_IDC_EPISODE_ID: 516
MDC_IDC_EPISODE_ID: 517
MDC_IDC_EPISODE_ID: 518
MDC_IDC_EPISODE_ID: 520
MDC_IDC_EPISODE_ID: 654
MDC_IDC_EPISODE_TYPE: NORMAL
MDC_IDC_LEAD_IMPLANT_DT: NORMAL
MDC_IDC_LEAD_IMPLANT_DT: NORMAL
MDC_IDC_LEAD_LOCATION: NORMAL
MDC_IDC_LEAD_LOCATION: NORMAL
MDC_IDC_LEAD_LOCATION_DETAIL_1: NORMAL
MDC_IDC_LEAD_LOCATION_DETAIL_1: NORMAL
MDC_IDC_LEAD_MFG: NORMAL
MDC_IDC_LEAD_MFG: NORMAL
MDC_IDC_LEAD_MODEL: NORMAL
MDC_IDC_LEAD_MODEL: NORMAL
MDC_IDC_LEAD_POLARITY_TYPE: NORMAL
MDC_IDC_LEAD_POLARITY_TYPE: NORMAL
MDC_IDC_LEAD_SERIAL: NORMAL
MDC_IDC_LEAD_SERIAL: NORMAL
MDC_IDC_LEAD_SPECIAL_FUNCTION: NORMAL
MDC_IDC_LEAD_SPECIAL_FUNCTION: NORMAL
MDC_IDC_MSMT_BATTERY_DTM: NORMAL
MDC_IDC_MSMT_BATTERY_REMAINING_LONGEVITY: 168 MO
MDC_IDC_MSMT_BATTERY_RRT_TRIGGER: 2.62
MDC_IDC_MSMT_BATTERY_STATUS: NORMAL
MDC_IDC_MSMT_BATTERY_VOLTAGE: 3.1 V
MDC_IDC_MSMT_LEADCHNL_RA_IMPEDANCE_VALUE: 361 OHM
MDC_IDC_MSMT_LEADCHNL_RA_IMPEDANCE_VALUE: 437 OHM
MDC_IDC_MSMT_LEADCHNL_RA_PACING_THRESHOLD_AMPLITUDE: 0.38 V
MDC_IDC_MSMT_LEADCHNL_RA_PACING_THRESHOLD_PULSEWIDTH: 0.4 MS
MDC_IDC_MSMT_LEADCHNL_RA_SENSING_INTR_AMPL: 3.5 MV
MDC_IDC_MSMT_LEADCHNL_RA_SENSING_INTR_AMPL: 3.5 MV
MDC_IDC_MSMT_LEADCHNL_RV_IMPEDANCE_VALUE: 437 OHM
MDC_IDC_MSMT_LEADCHNL_RV_IMPEDANCE_VALUE: 513 OHM
MDC_IDC_MSMT_LEADCHNL_RV_PACING_THRESHOLD_AMPLITUDE: 0.5 V
MDC_IDC_MSMT_LEADCHNL_RV_PACING_THRESHOLD_PULSEWIDTH: 0.4 MS
MDC_IDC_MSMT_LEADCHNL_RV_SENSING_INTR_AMPL: 7.62 MV
MDC_IDC_MSMT_LEADCHNL_RV_SENSING_INTR_AMPL: 7.62 MV
MDC_IDC_PG_IMPLANT_DTM: NORMAL
MDC_IDC_PG_MFG: NORMAL
MDC_IDC_PG_MODEL: NORMAL
MDC_IDC_PG_SERIAL: NORMAL
MDC_IDC_PG_TYPE: NORMAL
MDC_IDC_SESS_CLINIC_NAME: NORMAL
MDC_IDC_SESS_DTM: NORMAL
MDC_IDC_SESS_TYPE: NORMAL
MDC_IDC_SET_BRADY_AT_MODE_SWITCH_RATE: 171 {BEATS}/MIN
MDC_IDC_SET_BRADY_HYSTRATE: NORMAL
MDC_IDC_SET_BRADY_LOWRATE: 60 {BEATS}/MIN
MDC_IDC_SET_BRADY_MAX_SENSOR_RATE: 130 {BEATS}/MIN
MDC_IDC_SET_BRADY_MAX_TRACKING_RATE: 130 {BEATS}/MIN
MDC_IDC_SET_BRADY_MODE: NORMAL
MDC_IDC_SET_BRADY_PAV_DELAY_LOW: 180 MS
MDC_IDC_SET_BRADY_SAV_DELAY_LOW: 150 MS
MDC_IDC_SET_LEADCHNL_RA_PACING_AMPLITUDE: 1.5 V
MDC_IDC_SET_LEADCHNL_RA_PACING_ANODE_ELECTRODE_1: NORMAL
MDC_IDC_SET_LEADCHNL_RA_PACING_ANODE_LOCATION_1: NORMAL
MDC_IDC_SET_LEADCHNL_RA_PACING_CAPTURE_MODE: NORMAL
MDC_IDC_SET_LEADCHNL_RA_PACING_CATHODE_ELECTRODE_1: NORMAL
MDC_IDC_SET_LEADCHNL_RA_PACING_CATHODE_LOCATION_1: NORMAL
MDC_IDC_SET_LEADCHNL_RA_PACING_POLARITY: NORMAL
MDC_IDC_SET_LEADCHNL_RA_PACING_PULSEWIDTH: 0.4 MS
MDC_IDC_SET_LEADCHNL_RA_SENSING_ANODE_ELECTRODE_1: NORMAL
MDC_IDC_SET_LEADCHNL_RA_SENSING_ANODE_LOCATION_1: NORMAL
MDC_IDC_SET_LEADCHNL_RA_SENSING_CATHODE_ELECTRODE_1: NORMAL
MDC_IDC_SET_LEADCHNL_RA_SENSING_CATHODE_LOCATION_1: NORMAL
MDC_IDC_SET_LEADCHNL_RA_SENSING_POLARITY: NORMAL
MDC_IDC_SET_LEADCHNL_RA_SENSING_SENSITIVITY: 0.3 MV
MDC_IDC_SET_LEADCHNL_RV_PACING_AMPLITUDE: 2 V
MDC_IDC_SET_LEADCHNL_RV_PACING_ANODE_ELECTRODE_1: NORMAL
MDC_IDC_SET_LEADCHNL_RV_PACING_ANODE_LOCATION_1: NORMAL
MDC_IDC_SET_LEADCHNL_RV_PACING_CAPTURE_MODE: NORMAL
MDC_IDC_SET_LEADCHNL_RV_PACING_CATHODE_ELECTRODE_1: NORMAL
MDC_IDC_SET_LEADCHNL_RV_PACING_CATHODE_LOCATION_1: NORMAL
MDC_IDC_SET_LEADCHNL_RV_PACING_POLARITY: NORMAL
MDC_IDC_SET_LEADCHNL_RV_PACING_PULSEWIDTH: 0.4 MS
MDC_IDC_SET_LEADCHNL_RV_SENSING_ANODE_ELECTRODE_1: NORMAL
MDC_IDC_SET_LEADCHNL_RV_SENSING_ANODE_LOCATION_1: NORMAL
MDC_IDC_SET_LEADCHNL_RV_SENSING_CATHODE_ELECTRODE_1: NORMAL
MDC_IDC_SET_LEADCHNL_RV_SENSING_CATHODE_LOCATION_1: NORMAL
MDC_IDC_SET_LEADCHNL_RV_SENSING_POLARITY: NORMAL
MDC_IDC_SET_LEADCHNL_RV_SENSING_SENSITIVITY: 0.9 MV
MDC_IDC_SET_ZONE_DETECTION_INTERVAL: 350 MS
MDC_IDC_SET_ZONE_DETECTION_INTERVAL: 400 MS
MDC_IDC_SET_ZONE_TYPE: NORMAL
MDC_IDC_STAT_BRADY_AP_VP_PERCENT: 5 %
MDC_IDC_STAT_BRADY_AP_VS_PERCENT: 67.63 %
MDC_IDC_STAT_BRADY_AS_VP_PERCENT: 10.09 %
MDC_IDC_STAT_BRADY_AS_VS_PERCENT: 17.38 %
MDC_IDC_STAT_BRADY_DTM_END: NORMAL
MDC_IDC_STAT_BRADY_DTM_START: NORMAL
MDC_IDC_STAT_BRADY_RA_PERCENT_PACED: 14.58 %
MDC_IDC_STAT_BRADY_RV_PERCENT_PACED: 25.67 %
MDC_IDC_STAT_EPISODE_RECENT_COUNT: 0
MDC_IDC_STAT_EPISODE_RECENT_COUNT: 3
MDC_IDC_STAT_EPISODE_RECENT_COUNT: 860
MDC_IDC_STAT_EPISODE_RECENT_COUNT_DTM_END: NORMAL
MDC_IDC_STAT_EPISODE_RECENT_COUNT_DTM_START: NORMAL
MDC_IDC_STAT_EPISODE_TOTAL_COUNT: 0
MDC_IDC_STAT_EPISODE_TOTAL_COUNT: 1156
MDC_IDC_STAT_EPISODE_TOTAL_COUNT: 5
MDC_IDC_STAT_EPISODE_TOTAL_COUNT_DTM_END: NORMAL
MDC_IDC_STAT_EPISODE_TOTAL_COUNT_DTM_START: NORMAL
MDC_IDC_STAT_EPISODE_TYPE: NORMAL

## 2020-08-25 ENCOUNTER — TELEPHONE (OUTPATIENT)
Dept: FAMILY MEDICINE | Facility: CLINIC | Age: 60
End: 2020-08-25

## 2020-08-26 ENCOUNTER — TELEPHONE (OUTPATIENT)
Dept: RHEUMATOLOGY | Facility: CLINIC | Age: 60
End: 2020-08-26

## 2020-08-26 NOTE — TELEPHONE ENCOUNTER
Sorry, I did not see this did not clearly go through, I have not talked to her for her consent for a formal referral. A refill request came to me for the alendronate which I have not ordered on her. Miguelina, call her if she agrees to MTM and also refer her to endocrine to review her osteoporosis if a past rheum doc did this

## 2020-08-26 NOTE — TELEPHONE ENCOUNTER
Flory, I need a thorough review of the alendronate, osteoporosis history. I have not been prescribing this and last time I seen her I noted it was with PCP

## 2020-08-27 ENCOUNTER — TRANSFERRED RECORDS (OUTPATIENT)
Dept: HEALTH INFORMATION MANAGEMENT | Facility: CLINIC | Age: 60
End: 2020-08-27

## 2020-08-31 ENCOUNTER — VIRTUAL VISIT (OUTPATIENT)
Dept: FAMILY MEDICINE | Facility: CLINIC | Age: 60
End: 2020-08-31
Payer: COMMERCIAL

## 2020-08-31 DIAGNOSIS — D69.6 THROMBOCYTOPENIA, UNSPECIFIED (H): ICD-10-CM

## 2020-08-31 DIAGNOSIS — D84.9 IMMUNOCOMPROMISED STATE (H): Primary | ICD-10-CM

## 2020-08-31 DIAGNOSIS — I27.0 PRIMARY PULMONARY HYPERTENSION (H): ICD-10-CM

## 2020-08-31 DIAGNOSIS — R57.0 CARDIOGENIC SHOCK (H): ICD-10-CM

## 2020-08-31 DIAGNOSIS — M85.80 OSTEOPENIA, UNSPECIFIED LOCATION: ICD-10-CM

## 2020-08-31 DIAGNOSIS — C83.30 DIFFUSE LARGE B-CELL LYMPHOMA, UNSPECIFIED BODY REGION (H): ICD-10-CM

## 2020-08-31 DIAGNOSIS — I74.9 EMBOLISM AND THROMBOSIS OF UNSPECIFIED ARTERY (H): ICD-10-CM

## 2020-08-31 DIAGNOSIS — I48.0 PAROXYSMAL ATRIAL FIBRILLATION (H): Chronic | ICD-10-CM

## 2020-08-31 DIAGNOSIS — E44.1 MILD PROTEIN-CALORIE MALNUTRITION (H): ICD-10-CM

## 2020-08-31 PROCEDURE — 99214 OFFICE O/P EST MOD 30 MIN: CPT | Mod: 95 | Performed by: PHYSICIAN ASSISTANT

## 2020-08-31 NOTE — LETTER
[facility]  [address] [address]  Phone: [phone]    August 31, 2020        Amelia Michel  [address]  [facility] [address]          To whom it may concern:    RE: Amelia Michel    This patient is under my care for primary care.  Her specialists include:   Dr. Juan Eaton MD - cardiology.  [phone]  Dr. Maribell Domínguez MD / [facility] APRN CNP- rheumatology. [phone]   Dr. Lenore Rodriguez MD  - oncology/BMT. [phone]  Dr. Sabas Mcfarland - Podiatry. [phone]      Please contact me for questions or concerns.      Sincerely,        Gala Stringer PA-C

## 2020-08-31 NOTE — PROGRESS NOTES
"mAelia Michel is a 59 year old female who is being evaluated via a billable video visit.      The patient has been notified of following:     \"This video visit will be conducted via a call between you and your physician/provider. We have found that certain health care needs can be provided without the need for an in-person physical exam.  This service lets us provide the care you need with a video conversation.  If a prescription is necessary we can send it directly to your pharmacy.  If lab work is needed we can place an order for that and you can then stop by our lab to have the test done at a later time.    Video visits are billed at different rates depending on your insurance coverage.  Please reach out to your insurance provider with any questions.    If during the course of the call the physician/provider feels a video visit is not appropriate, you will not be charged for this service.\"    Patient has given verbal consent for Video visit? Yes  How would you like to obtain your AVS? MyChart  If you are dropped from the video visit, the video invite should be resent to: Text to cell phone: [phone]  Will anyone else be joining your video visit? No  Subjective     Amelia Michel is a 59 year old female who presents today via video visit for the following health issues:    HPI    Chief Complaint   Patient presents with     Forms     disability forms     She will be having surgery on foot with Dr. Mcfarland Oct 22. He thinks that the swelling is from the joint fluid and he will fuse the ankle. Sore is healed. She will see all her specialists in September.    [employer] disability forms. She was an RN doing clinical documentation  Severe a fib/cardiac arrest May 17, 2017. Has been on disability due to medical conditions after this  Neuropathy (post chemo), RA, post cardiac arrest (fatigue, brain fog), deconditioning             Video Start Time: 1:20 PM        Review of Systems   Other than noted above, " general, HEENT, respiratory, cardiac, MS, and gastrointestinal systems are negative.       Objective           Vitals:  No vitals were obtained today due to virtual visit.    Physical Exam     GENERAL: Healthy, alert and no distress  EYES: Eyes grossly normal to inspection.  No discharge or erythema, or obvious scleral/conjunctival abnormalities.  RESP: No audible wheeze, cough, or visible cyanosis.  No visible retractions or increased work of breathing.    SKIN: Visible skin clear. No significant rash, abnormal pigmentation or lesions.  NEURO: Cranial nerves grossly intact.  Mentation and speech appropriate for age.  PSYCH: Mentation appears normal, affect normal/bright, judgement and insight intact, normal speech and appearance well-groomed.        Assessment & Plan     ASSESSMENT/PLAN:      ICD-10-CM    1. Immunocompromised state (H)  D89.9    2. Mild protein-calorie malnutrition (H)  E44.1    3. Embolism and thrombosis of unspecified artery (H)  I74.9    4. Primary pulmonary hypertension (H)  I27.0    5. Cardiogenic shock (H)  R57.0    6. Thrombocytopenia, unspecified (H)  D69.6    7. Paroxysmal atrial fibrillation (H)  I48.0    8. Diffuse large B-cell lymphoma, unspecified body region (H)  C83.30    9. Osteopenia, unspecified location  M85.80      Patient is overall stable.  Disability forms filled out. Has been disabled since 2017 due to these medical conditions stemming from cardiac arrest, symptoms relating to this and RA and lymphoma.  Sees specialists for these conditions - has upcoming appointments with cardiology, rheumatology, BMT/oncology.  Will see me for preop prior to foot surgery.    Osteopenia:  Fosamax: she was on this 12 or more years, after cardiac arrest she was off the fosamax 6 months.  Restarted after dexa scan in 2018  She would like to talk with her rheumatologist in a couple weeks    Cardiac: received note from cardiologist regarding statin after I asked his opinion.    The patient is  minimalistic and has a history of multiple side efects from meds. Besides, her lipid panel is reasonable... I think she can do without a statin.     Return in about 1 month (around 9/30/2020).    Gala Stringer PA-C  [facility]      Video-Visit Details    Type of service:  Video Visit    Video End Time:1:54 PM    Originating Location (pt. Location): Home    Distant Location (provider location):  [facility]     Platform used for Video Visit: Patti

## 2020-08-31 NOTE — LETTER
[facility]  [address] [address]  Phone: [phone]    August 31, 2020        Amelia Michel  [address]  [facility] [address]          To whom it may concern:    RE: Amelia Michel    This patient is under my care for primary care.  Her specialists include:   Dr. Juan Eaton MD - cardiology.  [phone]  Dr. Maribell Domínguez MD / Frederic Gongora APRN CNP- rheumatology. [phone]   Dr. Lenore Rodriguez MD  - oncology/BMT. [phone]    Please contact me for questions or concerns.      Sincerely,        Gala Stringer PA-C

## 2020-09-04 ENCOUNTER — HOSPITAL ENCOUNTER (OUTPATIENT)
Facility: CLINIC | Age: 60
End: 2020-09-04
Attending: PODIATRIST | Admitting: PODIATRIST
Payer: COMMERCIAL

## 2020-09-04 DIAGNOSIS — Z11.59 ENCOUNTER FOR SCREENING FOR OTHER VIRAL DISEASES: Primary | ICD-10-CM

## 2020-09-09 NOTE — TELEPHONE ENCOUNTER
Still taking fosamax and is seeing Dr. Domínguez on 9/10/2020 and will discuss this medication during this visit.     Miguelina Barbosa MSN, RN  Rheumatology RN Care Coordinator   [facility]

## 2020-09-09 NOTE — TELEPHONE ENCOUNTER
Left message for Amelia to return my call.     Miguelina Barbosa MSN, RN  Rheumatology RN Care Coordinator  [facility]

## 2020-09-11 ENCOUNTER — VIRTUAL VISIT (OUTPATIENT)
Dept: RHEUMATOLOGY | Facility: CLINIC | Age: 60
End: 2020-09-11
Attending: INTERNAL MEDICINE
Payer: COMMERCIAL

## 2020-09-11 DIAGNOSIS — M81.0 OSTEOPOROSIS, UNSPECIFIED OSTEOPOROSIS TYPE, UNSPECIFIED PATHOLOGICAL FRACTURE PRESENCE: ICD-10-CM

## 2020-09-11 DIAGNOSIS — Z79.52 LONG TERM (CURRENT) USE OF SYSTEMIC STEROIDS: Primary | ICD-10-CM

## 2020-09-11 DIAGNOSIS — M05.79 RHEUMATOID ARTHRITIS INVOLVING MULTIPLE SITES WITH POSITIVE RHEUMATOID FACTOR (H): ICD-10-CM

## 2020-09-11 RX ORDER — ALENDRONATE SODIUM 70 MG/1
70 TABLET ORAL
Qty: 12 TABLET | Refills: 1 | Status: SHIPPED | OUTPATIENT
Start: 2020-09-11 | End: 2021-03-03

## 2020-09-11 ASSESSMENT — PAIN SCALES - GENERAL: PAINLEVEL: MILD PAIN (3)

## 2020-09-11 NOTE — PROGRESS NOTES
"Amelia Michel is a 59 year old female who is being evaluated via a billable video visit.      The patient has been notified of following:     \"This video visit will be conducted via a call between you and your physician/provider. We have found that certain health care needs can be provided without the need for an in-person physical exam.  This service lets us provide the care you need with a video conversation.  If a prescription is necessary we can send it directly to your pharmacy.  If lab work is needed we can place an order for that and you can then stop by our lab to have the test done at a later time.    Video visits are billed at different rates depending on your insurance coverage.  Please reach out to your insurance provider with any questions.    If during the course of the call the physician/provider feels a video visit is not appropriate, you will not be charged for this service.\"    Patient has given verbal consent for Video visit? Yes  How would you like to obtain your AVS? MyChart  If you are dropped from the video visit, the video invite should be resent to: Text to cell phone: [phone]  Will anyone else be joining your video visit? No        Video-Visit Details    Type of service:  Video Visit    Video Start Time: 10:56 am  Video End Time: 11:14 am    Originating Location (pt. Location): Home    Distant Location (provider location):  [facility] RHEUMATOLOGY     Platform used for Video Visit: Aniket Domínguez MD          "

## 2020-09-11 NOTE — PATIENT INSTRUCTIONS
Resume fosamax 70 mg a week    Labs and DEXA on 9/17/2020    Return visit with Frederic in 3 months, me in 6 months

## 2020-09-11 NOTE — LETTER
9/11/2020       RE: Amelia Michel  [address]  [place] [address]     Dear Colleague,    Thank you for referring your patient, Amelia Michel, to the [facility] RHEUMATOLOGY at [facility]. Please see a copy of my visit note below.    [facility] - Rheumatology Clinic Virtual Visit  Last see by Frederic Gongora NP: 3/11/2020  DOS: 9/11/2020    (this is a video visit due to COVID-19 outbreak), patient agreed      Amelia Michel  is a 59 year old here for rheumatoid arthritis (RA) dx 35 y/o. +CCP >331 -RF -KALYAN panel. 5-2108 XR 5-2018 DDD and facet osteoarthritis, congential fusion C2-3) prednisone since dx at 35 y/o, hydroxychloroquine (plaquenil) long-time since dx tapered by 200 mg 9-2019. Lymphoma in liver, heart, bone and mass between esophagus, left pelvic, right ankle     Review-Dr. Dumas in [facility] for many years. She had relatively inactive disease prior to her arrest but was on methotrexate 10 mg PO per week, Arava 10 mg daily, Plaquenil 200 mg twice per day, and prednisone 3 mg daily. She has had partial fusion of her right wrist. After discharge from the hospital she was put on prednisone 5 mg daily as monotherapy. Sulfasalazine -not effective long-time, initial alan but resolved after retried. Past Dr. Cunha and Dr. 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 Treated concervatively with plaquenil and 5mg prednisone, and not interested in rituximab or other biologics. She has previously been treated with sulfasalazine and initially developed a rash which was initially thought to be a sulfa rash, but her rash resolved and did not reoccur after being placed back on sulfasalazine. She was also treated with Arava, but this discontinued during her cancer treatment. She feels that these drugs have had no effect on her symptoms.  Etanercept (enbrel) in past no benefits.       Copy forward  Dr. Pritchett 2-2019:      Interval history 3/30/18: On 3/30/18, her rheumatoid arthritis appeared to be under good control. Her plan at this time was to continue prednisone 5mg daily and HCQ 200mg twice a day. She was told to have an eye exam and follow up in 3 months. It was recommended that she get a DEXA scan to evaluate whether bisphosphonate was indicated (had 7-8 years of Fosamax in early 2000's). Previous use of methotrexate has been linked to her development of a severe neuropathy in the digits of her upper and lower extremities. She has also previously used arava.    Interval history 5/31/18: The patient reports spraining her ankle prior to her cardiac arrest, which caused some neuropathy in her sprained foot. Therefore she is currently wearing an ankle brace and using a walker. However, her arthritis is doing fine she reports that the colchicine she was recently prescribed has been very effective. She mentions that she is switching insurance companies and had an unexpected arm surgery, which has prevented her from getting an eye exam that she is aware of being overdue for. She was also laid off recently but was able to fill the majority of her medications and made the most of her previous insurance so she should be able to manage for a while. She has undergone multiple CT scans and is currently finished with chemotherapy. Lymphoma is in remission and her heart is doing fine. She is interested in the options she has for medications that manage her rheumatoid arthritis and her lymphoma. She has taken many medications including plaquenil, prednisone, arava, methotrexate, orencia, and rituximab (for lymphoma), and has plenty of options to choose from if she wants to lower the cost of prescriptions or avoid unwanted side-effects. The plan at this time was to continue prednisone 5mg daily.     2-2019: Today, mAelia reports that she has had increased  "pain in her right shoulder, primarily losing rande of motion to extension. Notes that this has gotten worse after colchicine (for pericarditis) was stopped. She currently wears a brace on her feet to treat neuropathy, and uses a walker. She notes that these braces alter her gait, and as a result, has had some knee and low back pain, worse towards the end of the day.    Initial visit September 11, 2019  Frederic Gongora APRN:   Chronic low back pain into right knee, more this past month lumbar then right sided \"tennis ball\". Not evaluated over the time. Right foot neuropathy, with brace and some in left foot, left hand neuropathy chronic \"amiodorone infiltrated\". Chronic right hand arthralgia.     Gait impaired, walker for fear of falling since cardiac arrest 5/2017 lymphoma in heart led of afib long-term rehab, refractory afib before that. S/p cardioversion. Bone marrow bx 7/2017. Treatment July 2017 to 11/29/2017 (IV and intrathecal) think neuropathy came from, cardiomegaly from this and heart failure. EF 60-65% 9-2017, some effusion. Colchicine during this one a day and 'arthritis did great with that\". Never gone down on hydroxychloroquine (plaquenil)     Weather more arthralgia. Prednisone 5 mg once day, hydroxychloroquine (plaquenil) 200 mg BID , gabapentin 200 Mg TID very effective no side-effects, no vision issues. Caltrate BID, fosamax 70 mg every 7 day, right foot to 2\" strip to outside leg chronic stops at hip (one day had sattel anesthesia-image negative, did see neurology, who started gabapentin 7-2016), bilateral arches feet, left hand neuropathy is chronic left 1-3 fingers no feeling or the back side arm to elbow. No vision issues. No falls or injuries. No neck issues.       March 11, 2020  PPM 12-13 new this is helping, lower energy was put in for bradycardia, felt like was when had rapid afib.     Trouble with hands, hard to use like using for cleaning. 1-3 fingers affected. Right ankle sore. Pang are " the best time of year for her. Summer and warmer weather is harder. Right wrist fused. Denies any fever, chills, SOB, MONTALVO, night sweats, or chest pain, high fever, cough, travel in last 14 days or exposure to covid-19 (coronavirus). Reports healthy.chronic numb from prior lymphoma left outer arm, left bicep, left 4-5.     On fosamax per PCP, she will follow-up with PCP to review length of time on this and follow-up . Prednisone 5 mg once day chronically, hydroxychloroquine (plaquenil) 200 mg once day    Today 9/11/2020:     She is ok. Most of her pain is due to overuse of R hand and R shoulder. She has to re-estabblish with orthopedic doctor to have her R foot taken care of. Is going to see [facility] to schedule ankle fusion. She walks on the side of her foot. Wants to resume brava. Will see her oncologist next Thursday.  Is on prednsione 5 mg  Every day, wants  to come off as has gained weight. R wrist is fused.    AM stiffness=1  hr.           PMH:  Injury-left thumb amputation, left finger broke  Medical-  Antiplatelet or antithrombotic long-term use  Arrhythmia  Atrial tachycardia, flutter  Paroxysmal atrial fibrillation  Arthritis  Lymphoma  Cardiac arrest  history of chemotherapy  Primary pulmonary hypertension  Neuropathy  Postoperative nausea and vomiting  Rheumatoid arthritis  Hyperlipidemia LDL goal <130  Lymphedema of both lower extremities  Cardiogenic shock and cardiac arrest 5/2017  Acute respiratory failure with hypoxia  Critical illness myopathy  Sepsis  Wound of left upper extremity  Diffuse large B-cell lymphoma of extranodal site  Febrile neutropenia  Physical deconditioning  Palpitations  Osteoporosis  -DEXA 2018   Slowly healing wound in medial left antecubital fossa after traumatic IV  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, latter felt from lymphoma   Neuropathy of right foot with weakness after intrathecal cytarabine  perioditis   Right arm PICC blood  "clots during cancer treatments  Right shoulder effusion when had pericarditis       Past Surgical History:  Anesthesia cardioversion  Right wrist arthrodesis, partial fusion \"history migrated out\"  Bone marrow aspirate & biopsy  Excise lesion upper extremity - left wound excision and closure, possible submuscular transposition of ulnar nerve  Foot surgery - 4 left, 2 right  Carpal tunnel bilateral release  Repair ventricular septal defect  Transpositional ulnar nerve (elbow) left   Right shoulder effusion history     Family History:   No autoimmune disorders, psoriasis, UC, crohn's, SLE, RA, PsA, gout, autoimmune thyroid.  No MS, heart disease in family  Mother - breast cancer, hypertension, alzheimer disease-passed   Father - hypertension, lymphoma, circulatory A fib-passed  Paternal grandmother - diabetes  Siblings-younger sister heatlhy PB, younger brother think arthritis not dx PB     Social History:   No smoking or tobacco use. No alcohol use. No IVDU. None Children. Right handed. . Disability      PMSH personally reviewed and updated by me.    ROS:  A comprehensive ROS was done. Positives are per HPI.        Physical exam:    Constitutional: WD-WN-WG cooperative.   Eyes: nl EOM, conjunctiva, sclera,   MS: Wrists with markedly limited ROM at baseline, no synovitis. Pannus right wrist, Several deformities present in right and left fingers, worse in right righ ulnar deviation. In particular, right 2nd and 3rd MCP joints with mild chronic pannus. Ulnar deviation bilaterally. Right z-thumb.  Skin: No rash  Neuro: A&O  X 3  Skin: no rash  Psych: nl judgement, orientation, memory, affect.      Labs/Imaging:  Eye Exam (10/23/18)  Significant for mild H-lated nuclear cataracts.   Ocular CT was recommended for right eye.     CT Chest/Abdomen/Pelvis (9/26/18)  In this patient with a history of lymphoma:  1. No evidence of disease recurrence, consistent with complete  response per Lugano criteria.  2. Stable " cardiomegaly. Improved pericardial effusion.  3. Early contrast phase with heterogeneous visceral enhancement in the  abdomen, likely secondary to cardiac dysfunction.    MRI Cardiac w/contrast (4/12/18)  Loculated exudative pericardial effusion that is beginning to organize, with diffuse  pericardial enhancement consistent with ongoing inflammation. Normal LV fxn, LVEF 54% and RVEF 49%. At  least moderate, possibly severe tricuspid regurgitation. Compared to MRI from 03/2018, effusion is largely  unchanged in size (maybe a bit smaller) but is starting to organize. No change in pericardial enhancement.    Laboratory:     Component      Latest Ref Rng & Units 12/12/2019 12/13/2019   WBC      4.0 - 11.0 10e9/L 6.8 5.1   RBC Count      3.8 - 5.2 10e12/L 3.99 3.93   Hemoglobin      11.7 - 15.7 g/dL 14.2 13.8   Hematocrit      35.0 - 47.0 % 42.1 41.4   MCV      78 - 100 fl 106 (H) 105 (H)   MCH      26.5 - 33.0 pg 35.6 (H) 35.1 (H)   MCHC      31.5 - 36.5 g/dL 33.7 33.3   RDW      10.0 - 15.0 % 12.5 12.5   Platelet Count      150 - 450 10e9/L 150 138 (L)   Diff Method       Automated Method    % Neutrophils      % 87.0    % Lymphocytes      % 5.6    % Monocytes      % 6.3    % Eosinophils      % 0.4    % Basophils      % 0.4    % Immature Granulocytes      % 0.3    Nucleated RBCs      0 /100 0    Absolute Neutrophil      1.6 - 8.3 10e9/L 6.0    Absolute Lymphocytes      0.8 - 5.3 10e9/L 0.4 (L)    Absolute Monocytes      0.0 - 1.3 10e9/L 0.4    Absolute Eosinophils      0.0 - 0.7 10e9/L 0.0    Absolute Basophils      0.0 - 0.2 10e9/L 0.0    Abs Immature Granulocytes      0 - 0.4 10e9/L 0.0    Absolute Nucleated RBC       0.0    Sodium      133 - 144 mmol/L 138 140   Potassium      3.4 - 5.3 mmol/L 4.2 3.7   Chloride      94 - 109 mmol/L 105 106   Carbon Dioxide      20 - 32 mmol/L 26 28   Anion Gap      3 - 14 mmol/L 7 6   Glucose      70 - 99 mg/dL 104 (H) 82   Urea Nitrogen      7 - 30 mg/dL 19 22   Creatinine       0.52 - 1.04 mg/dL 0.78 0.78   GFR Estimate      >60 mL/min/1.73:m2 83 83   GFR Estimate If Black      >60 mL/min/1.73:m2 >90 >90   Calcium      8.5 - 10.1 mg/dL 9.8 9.3   Bilirubin Total      0.2 - 1.3 mg/dL 0.8    Albumin      3.4 - 5.0 g/dL 4.0    Protein Total      6.8 - 8.8 g/dL 7.6    Alkaline Phosphatase      40 - 150 U/L 70    ALT      0 - 50 U/L 60 (H)    AST      0 - 45 U/L Canceled, Test credited    Digoxin Level      0.5 - 2.0 ug/L  0.7     Impression/Plan:  1. Seropositive rheumatoid arthritis (, RF 31) with multiple joint disability including MTP fusion 1-5 bilaterally, partial right wrist fusion, subluxation and ulnar deformity of MCP's. Rheumatoid arthritis (RA) not controlled.  Will continue prednisone, and hydroxychloroquine (plaquenil) 200 mg once a day. Prn joint injection or a short burst of prednisone for symptom relief (per her preference). Discussion of options with her leflunomide (arava) --had  Liver involvment with #3, sulfasalazine re-trial, adalimumab (humira) but higher risk of cancer. She will discuss with oncology and think about this. She is not interested in using an alternative (leflunomide or rituximab) for RA.  Advise cervical x-rays prior to any surgeries, does have DDD (no symptoms at this time). Poor prognosis, discussed risk extraarticular and deformities risks and of AA instability, she understands.  2. Long-term hydroxychloroquine (plaquenil) use since 6-2017, OCT  no toxicity, reviewed AAO guidelines, repeat every year. 200 mg once a day   3. Diffuse large B-cell lymphoma status-post 1 cycle R-EPOCH, 6 cycles R-CHOP; per oncology for surveillance   4. Osteoporosis, chronic long-term corticosteroid use with cushingoid appearance. Received 6-7 years of alendronate in early 2000s. Restarted alendronate in 1/2019. DEXA  T-2.6 thoracic. Continue vit D-Ca supplementation, walker for fall risk prevention but should discuss with PCP about this as if > 5 yr  "higher risk atypical infections  5. 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 Low back pain, no radiculopathy.  If any redflags, 911/ER. If not improved or new symptoms, would advise follow-up with PCP and do imaging (risk of compression fx with osteoporosis )  8. Loculated pericardial effusion, etiology unclear (lymphoma). No symptoms now. Per oncology      Today's 9/11/2020 plan:    No change inn RA meds (stable RA)    Resume fosamax 70 mg a week    Labs and DEXA on 9/17/2020    Return visit with Frederic in 3 months, me in 6 months    Video Start Time: 10:56 am  Video End Time: 11:14 am    Orders Placed This Encounter   Procedures     Dexa hip/pelvis/spine     CRP inflammation     Erythrocyte sedimentation rate auto         Amelia Michel is a 59 year old female who is being evaluated via a billable video visit.      The patient has been notified of following:     \"This video visit will be conducted via a call between you and your physician/provider. We have found that certain health care needs can be provided without the need for an in-person physical exam.  This service lets us provide the care you need with a video conversation.  If a prescription is necessary we can send it directly to your pharmacy.  If lab work is needed we can place an order for that and you can then stop by our lab to have the test done at a later time.    Video visits are billed at different rates depending on your insurance coverage.  Please reach out to your insurance provider with any questions.    If during the course of the call the physician/provider feels a video visit is not appropriate, you will not be charged for this service.\"    Patient has given verbal consent for Video visit? Yes  How would you like to obtain your AVS? MyChart  If you are dropped from the video visit, the video invite should be resent to: Text to cell phone: [phone]  Will anyone else be " joining your video visit? No        Video-Visit Details    Type of service:  Video Visit    Video Start Time: 10:56 am  Video End Time: 11:14 am    Originating Location (pt. Location): Home    Distant Location (provider location):  [facility] RHEUMATOLOGY     Platform used for Video Visit: Aniket Domínguez MD

## 2020-09-11 NOTE — PROGRESS NOTES
[facility] - Rheumatology Clinic Virtual Visit  Last see by Frederic Gongora NP: 3/11/2020  DOS: 9/11/2020    (this is a video visit due to COVID-19 outbreak), patient agreed      Amelia Michel  is a 59 year old here for rheumatoid arthritis (RA) dx 33 y/o. +CCP >331 -RF -KALYAN panel. 5-2108 XR 5-2018 DDD and facet osteoarthritis, congential fusion C2-3) prednisone since dx at 33 y/o, hydroxychloroquine (plaquenil) long-time since dx tapered by 200 mg 9-2019. Lymphoma in liver, heart, bone and mass between esophagus, left pelvic, right ankle     Review-Dr. Dumas in [facility] for many years. She had relatively inactive disease prior to her arrest but was on methotrexate 10 mg PO per week, Arava 10 mg daily, Plaquenil 200 mg twice per day, and prednisone 3 mg daily. She has had partial fusion of her right wrist. After discharge from the hospital she was put on prednisone 5 mg daily as monotherapy. Sulfasalazine -not effective long-time, initial alan but resolved after retried. Past Dr. Cunha and Dr. 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 Treated concervatively with plaquenil and 5mg prednisone, and not interested in rituximab or other biologics. She has previously been treated with sulfasalazine and initially developed a rash which was initially thought to be a sulfa rash, but her rash resolved and did not reoccur after being placed back on sulfasalazine. She was also treated with Arava, but this discontinued during her cancer treatment. She feels that these drugs have had no effect on her symptoms. Etanercept (enbrel) in past no benefits.       Copy forward  Dr. Pritchett 2-2019:      Interval history 3/30/18: On 3/30/18, her rheumatoid arthritis appeared to be under good control. Her plan at this time was to continue prednisone 5mg daily and HCQ 200mg twice a day. She was told to  have an eye exam and follow up in 3 months. It was recommended that she get a DEXA scan to evaluate whether bisphosphonate was indicated (had 7-8 years of Fosamax in early 2000's). Previous use of methotrexate has been linked to her development of a severe neuropathy in the digits of her upper and lower extremities. She has also previously used arava.    Interval history 5/31/18: The patient reports spraining her ankle prior to her cardiac arrest, which caused some neuropathy in her sprained foot. Therefore she is currently wearing an ankle brace and using a walker. However, her arthritis is doing fine she reports that the colchicine she was recently prescribed has been very effective. She mentions that she is switching insurance companies and had an unexpected arm surgery, which has prevented her from getting an eye exam that she is aware of being overdue for. She was also laid off recently but was able to fill the majority of her medications and made the most of her previous insurance so she should be able to manage for a while. She has undergone multiple CT scans and is currently finished with chemotherapy. Lymphoma is in remission and her heart is doing fine. She is interested in the options she has for medications that manage her rheumatoid arthritis and her lymphoma. She has taken many medications including plaquenil, prednisone, arava, methotrexate, orencia, and rituximab (for lymphoma), and has plenty of options to choose from if she wants to lower the cost of prescriptions or avoid unwanted side-effects. The plan at this time was to continue prednisone 5mg daily.     2-2019: Today, Amelia reports that she has had increased pain in her right shoulder, primarily losing rande of motion to extension. Notes that this has gotten worse after colchicine (for pericarditis) was stopped. She currently wears a brace on her feet to treat neuropathy, and uses a walker. She notes that these braces alter her gait, and as  "a result, has had some knee and low back pain, worse towards the end of the day.    Initial visit September 11, 2019  Frederic JOHNSON:   Chronic low back pain into right knee, more this past month lumbar then right sided \"tennis ball\". Not evaluated over the time. Right foot neuropathy, with brace and some in left foot, left hand neuropathy chronic \"amiodorone infiltrated\". Chronic right hand arthralgia.     Gait impaired, walker for fear of falling since cardiac arrest 5/2017 lymphoma in heart led of afib long-term rehab, refractory afib before that. S/p cardioversion. Bone marrow bx 7/2017. Treatment July 2017 to 11/29/2017 (IV and intrathecal) think neuropathy came from, cardiomegaly from this and heart failure. EF 60-65% 9-2017, some effusion. Colchicine during this one a day and 'arthritis did great with that\". Never gone down on hydroxychloroquine (plaquenil)     Weather more arthralgia. Prednisone 5 mg once day, hydroxychloroquine (plaquenil) 200 mg BID , gabapentin 200 Mg TID very effective no side-effects, no vision issues. Caltrate BID, fosamax 70 mg every 7 day, right foot to 2\" strip to outside leg chronic stops at hip (one day had sattel anesthesia-image negative, did see neurology, who started gabapentin 7-2016), bilateral arches feet, left hand neuropathy is chronic left 1-3 fingers no feeling or the back side arm to elbow. No vision issues. No falls or injuries. No neck issues.       March 11, 2020  PPM 12-13 new this is helping, lower energy was put in for bradycardia, felt like was when had rapid afib.     Trouble with hands, hard to use like using for cleaning. 1-3 fingers affected. Right ankle sore. Pang are the best time of year for her. Summer and warmer weather is harder. Right wrist fused. Denies any fever, chills, SOB, MONTALVO, night sweats, or chest pain, high fever, cough, travel in last 14 days or exposure to covid-19 (coronavirus). Reports healthy.chronic numb from prior lymphoma left " "outer arm, left bicep, left 4-5.     On fosamax per PCP, she will follow-up with PCP to review length of time on this and follow-up . Prednisone 5 mg once day chronically, hydroxychloroquine (plaquenil) 200 mg once day    Today 9/11/2020:     She is ok. Most of her pain is due to overuse of R hand and R shoulder. She has to re-estabblish with orthopedic doctor to have her R foot taken care of. Is going to see [facility] to schedule ankle fusion. She walks on the side of her foot. Wants to resume brava. Will see her oncologist next Thursday.  Is on prednsione 5 mg  Every day, wants  to come off as has gained weight. R wrist is fused.    AM stiffness=1  hr.           PMH:  Injury-left thumb amputation, left finger broke  Medical-  Antiplatelet or antithrombotic long-term use  Arrhythmia  Atrial tachycardia, flutter  Paroxysmal atrial fibrillation  Arthritis  Lymphoma  Cardiac arrest  history of chemotherapy  Primary pulmonary hypertension  Neuropathy  Postoperative nausea and vomiting  Rheumatoid arthritis  Hyperlipidemia LDL goal <130  Lymphedema of both lower extremities  Cardiogenic shock and cardiac arrest 5/2017  Acute respiratory failure with hypoxia  Critical illness myopathy  Sepsis  Wound of left upper extremity  Diffuse large B-cell lymphoma of extranodal site  Febrile neutropenia  Physical deconditioning  Palpitations  Osteoporosis  -DEXA 2018   Slowly healing wound in medial left antecubital fossa after traumatic IV  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, latter felt from lymphoma   Neuropathy of right foot with weakness after intrathecal cytarabine  perioditis   Right arm PICC blood clots during cancer treatments  Right shoulder effusion when had pericarditis       Past Surgical History:  Anesthesia cardioversion  Right wrist arthrodesis, partial fusion \"history migrated out\"  Bone marrow aspirate & biopsy  Excise lesion upper extremity - left wound excision and " closure, possible submuscular transposition of ulnar nerve  Foot surgery - 4 left, 2 right  Carpal tunnel bilateral release  Repair ventricular septal defect  Transpositional ulnar nerve (elbow) left   Right shoulder effusion history     Family History:   No autoimmune disorders, psoriasis, UC, crohn's, SLE, RA, PsA, gout, autoimmune thyroid.  No MS, heart disease in family  Mother - breast cancer, hypertension, alzheimer disease-passed   Father - hypertension, lymphoma, circulatory A fib-passed  Paternal grandmother - diabetes  Siblings-younger sister heatlhy PB, younger brother think arthritis not dx PB     Social History:   No smoking or tobacco use. No alcohol use. No IVDU. None Children. Right handed. . Disability      PMSH personally reviewed and updated by me.    ROS:  A comprehensive ROS was done. Positives are per HPI.        Physical exam:    Constitutional: WD-WN-WG cooperative.   Eyes: nl EOM, conjunctiva, sclera,   MS: Wrists with markedly limited ROM at baseline, no synovitis. Pannus right wrist, Several deformities present in right and left fingers, worse in right righ ulnar deviation. In particular, right 2nd and 3rd MCP joints with mild chronic pannus. Ulnar deviation bilaterally. Right z-thumb.  Skin: No rash  Neuro: A&O  X 3  Skin: no rash  Psych: nl judgement, orientation, memory, affect.      Labs/Imaging:  Eye Exam (10/23/18)  Significant for mild H-lated nuclear cataracts.   Ocular CT was recommended for right eye.     CT Chest/Abdomen/Pelvis (9/26/18)  In this patient with a history of lymphoma:  1. No evidence of disease recurrence, consistent with complete  response per Lugano criteria.  2. Stable cardiomegaly. Improved pericardial effusion.  3. Early contrast phase with heterogeneous visceral enhancement in the  abdomen, likely secondary to cardiac dysfunction.    MRI Cardiac w/contrast (4/12/18)  Loculated exudative pericardial effusion that is beginning to organize, with  diffuse  pericardial enhancement consistent with ongoing inflammation. Normal LV fxn, LVEF 54% and RVEF 49%. At  least moderate, possibly severe tricuspid regurgitation. Compared to MRI from 03/2018, effusion is largely  unchanged in size (maybe a bit smaller) but is starting to organize. No change in pericardial enhancement.    Laboratory:     Component      Latest Ref Rng & Units 12/12/2019 12/13/2019   WBC      4.0 - 11.0 10e9/L 6.8 5.1   RBC Count      3.8 - 5.2 10e12/L 3.99 3.93   Hemoglobin      11.7 - 15.7 g/dL 14.2 13.8   Hematocrit      35.0 - 47.0 % 42.1 41.4   MCV      78 - 100 fl 106 (H) 105 (H)   MCH      26.5 - 33.0 pg 35.6 (H) 35.1 (H)   MCHC      31.5 - 36.5 g/dL 33.7 33.3   RDW      10.0 - 15.0 % 12.5 12.5   Platelet Count      150 - 450 10e9/L 150 138 (L)   Diff Method       Automated Method    % Neutrophils      % 87.0    % Lymphocytes      % 5.6    % Monocytes      % 6.3    % Eosinophils      % 0.4    % Basophils      % 0.4    % Immature Granulocytes      % 0.3    Nucleated RBCs      0 /100 0    Absolute Neutrophil      1.6 - 8.3 10e9/L 6.0    Absolute Lymphocytes      0.8 - 5.3 10e9/L 0.4 (L)    Absolute Monocytes      0.0 - 1.3 10e9/L 0.4    Absolute Eosinophils      0.0 - 0.7 10e9/L 0.0    Absolute Basophils      0.0 - 0.2 10e9/L 0.0    Abs Immature Granulocytes      0 - 0.4 10e9/L 0.0    Absolute Nucleated RBC       0.0    Sodium      133 - 144 mmol/L 138 140   Potassium      3.4 - 5.3 mmol/L 4.2 3.7   Chloride      94 - 109 mmol/L 105 106   Carbon Dioxide      20 - 32 mmol/L 26 28   Anion Gap      3 - 14 mmol/L 7 6   Glucose      70 - 99 mg/dL 104 (H) 82   Urea Nitrogen      7 - 30 mg/dL 19 22   Creatinine      0.52 - 1.04 mg/dL 0.78 0.78   GFR Estimate      >60 mL/min/1.73:m2 83 83   GFR Estimate If Black      >60 mL/min/1.73:m2 >90 >90   Calcium      8.5 - 10.1 mg/dL 9.8 9.3   Bilirubin Total      0.2 - 1.3 mg/dL 0.8    Albumin      3.4 - 5.0 g/dL 4.0    Protein Total      6.8 - 8.8 g/dL  7.6    Alkaline Phosphatase      40 - 150 U/L 70    ALT      0 - 50 U/L 60 (H)    AST      0 - 45 U/L Canceled, Test credited    Digoxin Level      0.5 - 2.0 ug/L  0.7     Impression/Plan:  1. Seropositive rheumatoid arthritis (, RF 31) with multiple joint disability including MTP fusion 1-5 bilaterally, partial right wrist fusion, subluxation and ulnar deformity of MCP's. Rheumatoid arthritis (RA) not controlled.  Will continue prednisone, and hydroxychloroquine (plaquenil) 200 mg once a day. Prn joint injection or a short burst of prednisone for symptom relief (per her preference). Discussion of options with her leflunomide (arava) --had  Liver involvment with #3, sulfasalazine re-trial, adalimumab (humira) but higher risk of cancer. She will discuss with oncology and think about this. She is not interested in using an alternative (leflunomide or rituximab) for RA.  Advise cervical x-rays prior to any surgeries, does have DDD (no symptoms at this time). Poor prognosis, discussed risk extraarticular and deformities risks and of AA instability, she understands.  2. Long-term hydroxychloroquine (plaquenil) use since 6-2017, OCT  no toxicity, reviewed AAO guidelines, repeat every year. 200 mg once a day   3. Diffuse large B-cell lymphoma status-post 1 cycle R-EPOCH, 6 cycles R-CHOP; per oncology for surveillance   4. Osteoporosis, chronic long-term corticosteroid use with cushingoid appearance. Received 6-7 years of alendronate in early 2000s. Restarted alendronate in 1/2019. DEXA  T-2.6 thoracic. Continue vit D-Ca supplementation, walker for fall risk prevention but should discuss with PCP about this as if > 5 yr higher risk atypical infections  5. 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 Low back pain, no radiculopathy.  If any redflags, 911/ER. If not improved or new symptoms, would advise follow-up with PCP and do imaging (risk  of compression fx with osteoporosis )  8. Loculated pericardial effusion, etiology unclear (lymphoma). No symptoms now. Per oncology      Today's 9/11/2020 plan:    No change inn RA meds (stable RA)    Resume fosamax 70 mg a week    Labs and DEXA on 9/17/2020    Return visit with Frederic in 3 months, me in 6 months    Video Start Time: 10:56 am  Video End Time: 11:14 am    Orders Placed This Encounter   Procedures     Dexa hip/pelvis/spine     CRP inflammation     Erythrocyte sedimentation rate auto

## 2020-09-17 ENCOUNTER — OFFICE VISIT (OUTPATIENT)
Dept: TRANSPLANT | Facility: CLINIC | Age: 60
End: 2020-09-17
Attending: INTERNAL MEDICINE
Payer: COMMERCIAL

## 2020-09-17 VITALS
SYSTOLIC BLOOD PRESSURE: 110 MMHG | RESPIRATION RATE: 16 BRPM | HEART RATE: 86 BPM | BODY MASS INDEX: 28.53 KG/M2 | DIASTOLIC BLOOD PRESSURE: 68 MMHG | WEIGHT: 136.5 LBS | OXYGEN SATURATION: 99 % | TEMPERATURE: 98.2 F

## 2020-09-17 DIAGNOSIS — C83.30 DIFFUSE LARGE B-CELL LYMPHOMA, UNSPECIFIED BODY REGION (H): Chronic | ICD-10-CM

## 2020-09-17 DIAGNOSIS — M05.79 RHEUMATOID ARTHRITIS INVOLVING MULTIPLE SITES WITH POSITIVE RHEUMATOID FACTOR (H): ICD-10-CM

## 2020-09-17 DIAGNOSIS — C83.3A DIFFUSE LARGE CELL LYMPHOMA IN REMISSION: Primary | ICD-10-CM

## 2020-09-17 LAB
ALBUMIN SERPL-MCNC: 3.8 G/DL (ref 3.4–5)
ALP SERPL-CCNC: 83 U/L (ref 40–150)
ALT SERPL W P-5'-P-CCNC: 40 U/L (ref 0–50)
ANION GAP SERPL CALCULATED.3IONS-SCNC: 7 MMOL/L (ref 3–14)
AST SERPL W P-5'-P-CCNC: 30 U/L (ref 0–45)
BASOPHILS # BLD AUTO: 0 10E9/L (ref 0–0.2)
BASOPHILS NFR BLD AUTO: 0.5 %
BILIRUB SERPL-MCNC: 0.6 MG/DL (ref 0.2–1.3)
BUN SERPL-MCNC: 19 MG/DL (ref 7–30)
CALCIUM SERPL-MCNC: 9.8 MG/DL (ref 8.5–10.1)
CHLORIDE SERPL-SCNC: 104 MMOL/L (ref 94–109)
CO2 SERPL-SCNC: 28 MMOL/L (ref 20–32)
CREAT SERPL-MCNC: 0.88 MG/DL (ref 0.52–1.04)
CRP SERPL-MCNC: 20.5 MG/L (ref 0–8)
DIFFERENTIAL METHOD BLD: ABNORMAL
EOSINOPHIL # BLD AUTO: 0 10E9/L (ref 0–0.7)
EOSINOPHIL NFR BLD AUTO: 0.5 %
ERYTHROCYTE [DISTWIDTH] IN BLOOD BY AUTOMATED COUNT: 13 % (ref 10–15)
ERYTHROCYTE [SEDIMENTATION RATE] IN BLOOD BY WESTERGREN METHOD: 16 MM/H (ref 0–30)
GFR SERPL CREATININE-BSD FRML MDRD: 72 ML/MIN/{1.73_M2}
GLUCOSE SERPL-MCNC: 127 MG/DL (ref 70–99)
HCT VFR BLD AUTO: 42 % (ref 35–47)
HGB BLD-MCNC: 14.2 G/DL (ref 11.7–15.7)
IMM GRANULOCYTES # BLD: 0 10E9/L (ref 0–0.4)
IMM GRANULOCYTES NFR BLD: 0.3 %
LYMPHOCYTES # BLD AUTO: 0.7 10E9/L (ref 0.8–5.3)
LYMPHOCYTES NFR BLD AUTO: 10.3 %
MCH RBC QN AUTO: 35.7 PG (ref 26.5–33)
MCHC RBC AUTO-ENTMCNC: 33.8 G/DL (ref 31.5–36.5)
MCV RBC AUTO: 106 FL (ref 78–100)
MONOCYTES # BLD AUTO: 0.5 10E9/L (ref 0–1.3)
MONOCYTES NFR BLD AUTO: 7.1 %
NEUTROPHILS # BLD AUTO: 5.3 10E9/L (ref 1.6–8.3)
NEUTROPHILS NFR BLD AUTO: 81.3 %
NRBC # BLD AUTO: 0 10*3/UL
NRBC BLD AUTO-RTO: 0 /100
PLATELET # BLD AUTO: 152 10E9/L (ref 150–450)
POTASSIUM SERPL-SCNC: 3.9 MMOL/L (ref 3.4–5.3)
PROT SERPL-MCNC: 7.8 G/DL (ref 6.8–8.8)
RBC # BLD AUTO: 3.98 10E12/L (ref 3.8–5.2)
SODIUM SERPL-SCNC: 139 MMOL/L (ref 133–144)
WBC # BLD AUTO: 6.5 10E9/L (ref 4–11)

## 2020-09-17 PROCEDURE — 36415 COLL VENOUS BLD VENIPUNCTURE: CPT

## 2020-09-17 PROCEDURE — 85652 RBC SED RATE AUTOMATED: CPT | Performed by: INTERNAL MEDICINE

## 2020-09-17 PROCEDURE — 85025 COMPLETE CBC W/AUTO DIFF WBC: CPT | Performed by: INTERNAL MEDICINE

## 2020-09-17 PROCEDURE — 80053 COMPREHEN METABOLIC PANEL: CPT | Performed by: INTERNAL MEDICINE

## 2020-09-17 PROCEDURE — 86140 C-REACTIVE PROTEIN: CPT | Performed by: INTERNAL MEDICINE

## 2020-09-17 PROCEDURE — G0463 HOSPITAL OUTPT CLINIC VISIT: HCPCS | Mod: ZF

## 2020-09-17 PROCEDURE — 82784 ASSAY IGA/IGD/IGG/IGM EACH: CPT | Performed by: INTERNAL MEDICINE

## 2020-09-17 ASSESSMENT — PAIN SCALES - GENERAL: PAINLEVEL: MILD PAIN (3)

## 2020-09-17 NOTE — NURSING NOTE
"Oncology Rooming Note    September 17, 2020 2:01 PM   Amelia Michel is a 59 year old female who presents for:    Chief Complaint   Patient presents with     Oncology Clinic Visit     DLBCL      Blood Draw     vpt blood draw, vitals taken, checked into next appointment.     Initial Vitals: /68   Pulse 86   Temp 98.2  F (36.8  C) (Oral)   Resp 16   Wt 61.9 kg (136 lb 8 oz)   SpO2 99%   BMI 28.53 kg/m   Estimated body mass index is 28.53 kg/m  as calculated from the following:    Height as of 3/20/20: 1.473 m (4' 10\").    Weight as of this encounter: 61.9 kg (136 lb 8 oz). Body surface area is 1.59 meters squared.  Mild Pain (3) Comment: Data Unavailable   No LMP recorded. Patient is postmenopausal.  Allergies reviewed: Yes  Medications reviewed: Yes    Medications: Medication refills not needed today.  Pharmacy name entered into [facility]: [id number] - [place], [place] - [address] AT [facility] [address]    Clinical concerns: No new concerns today  Dr Rodriguez  was notified.      Ailyn Villarreal            "

## 2020-09-17 NOTE — PROGRESS NOTES
Chief Complaint   Patient presents with     Oncology Clinic Visit     DLBCL      Blood Draw     vpt blood draw, vitals taken, checked into next appointment.     Venipuncture labs drawn from right hand.    Becky Walker MA

## 2020-09-17 NOTE — LETTER
9/17/2020         RE: Amelia Michel  [address]  [place] [address]        Dear Colleague,    Thank you for referring your patient, Amelia Michel, to the [facility]. Please see a copy of my visit note below.    Chief Complaint   Patient presents with     Oncology Clinic Visit     DLBCL      Blood Draw     vpt blood draw, vitals taken, checked into next appointment.     Venipuncture labs drawn from right hand.    Becky Walker MA             Oncology Phone Visit  Sep 17, 2020         Disease and Treatment History:  1. Complicated patient with history of Rheumatoid Arthritis status post long term treatment with methotrexate with recent significant complicated course with cardiac arrest, admission with hypotension, sepsis, ICU stay and intubation with subsequent additional readmission from TCU in 6/2017 for FUO with extensive work-up with eventual finding of progression cytopenias with eventual lymphoma diagnosis  2. Bone Marrow Biopsy: 7/12/2017: Highly suspicious for involvement by B cell lymphoma with foci of large atypical CD20+ cells  3. PET CT 7/19/2017: Extensive activity: Right paratracheal lesion with SUV 28, many liver lesions with SUV 15, cardiac lesion intraarterial septum, numerous bone lesions.  4. 7/21 Liver Biopsy: Consistent with Diffuse Large B Cell Lymphoma non-germinal center phenotype  -- FISH for myc, bcl2, bcl6 negative for double-hit lymphoma  5. IPI based on Age < 60 (0 points) + PS 2 (1 + point) + Stage IV Disease (1 point) + Extranodal disease > 1 site (1 point) + elevated LDH (1 point) = 4 Poor Risk Disease  6. Cycle 1 given as R-EPOCH Day 1 = 7/27/2017  -- complications of transfusion dependence and need for acute rehab placement (likely more result of extensive hospital stays)  - LP negative for CNS involvement 8/23/2017  7. Cycle #2: Transition to R-CHOP Day 1 = 8/22/2017  - Day 15 high dose MTX for CNS prophylaxis  - complicated by  significant fluid overload and PICC associated DVT  8. Cycle #3 Day 1 = 9/20/2017 Post Cycle 3 PET CR. Cycle 4 10/11/2017 + IT prophylaxis, Cycle 5 11/8/17, Cycle 6 11/29/2017 + IT prophylaxis  -- Post Treatment completion PET 1/15/2018: Complete Remission        HPI:Fidelia comes for 6 month follow up today. Since I saw her last on our telemedicine visit she notes she has overall been doing ok. Did have some symptomatic bradycardia and got a pacemaker and better since then. No fevers or chills, no night sweats, no chest pain, no abdominal pain, no lumps or bumps. Had plans for ankle surgery for contractures on her right ankle        10 point ROS otherwise negative        Physical Exam:  /68   Pulse 86   Temp 98.2  F (36.8  C) (Oral)   Resp 16   Wt 61.9 kg (136 lb 8 oz)   SpO2 99%   BMI 28.53 kg/m    Looks well. KPS 80  HEENT: no icterus or injection, OP clear, no thrush  Lungs: CTAB no crackles or wheezes  CV: RRR with systolic murmur 3/6 LUSB  Abd: soft, NT  Ext: komal stockings, right brace in place    Labs:     Results for FIDELIA MIDDLETON (MRN [id number]) as of 9/17/2020 14:06   Ref. Range 9/17/2020 13:50   WBC Latest Ref Range: 4.0 - 11.0 10e9/L 6.5   Hemoglobin Latest Ref Range: 11.7 - 15.7 g/dL 14.2   Hematocrit Latest Ref Range: 35.0 - 47.0 % 42.0   Platelet Count Latest Ref Range: 150 - 450 10e9/L 152   RBC Count Latest Ref Range: 3.8 - 5.2 10e12/L 3.98   MCV Latest Ref Range: 78 - 100 fl 106 (H)   MCH Latest Ref Range: 26.5 - 33.0 pg 35.7 (H)   MCHC Latest Ref Range: 31.5 - 36.5 g/dL 33.8   RDW Latest Ref Range: 10.0 - 15.0 % 13.0   Diff Method Unknown Automated Method   % Neutrophils Latest Units: % 81.3   % Lymphocytes Latest Units: % 10.3   % Monocytes Latest Units: % 7.1   % Eosinophils Latest Units: % 0.5   % Basophils Latest Units: % 0.5   % Immature Granulocytes Latest Units: % 0.3   Nucleated RBCs Latest Ref Range: 0 /100 0   Absolute Neutrophil Latest Ref Range: 1.6 - 8.3 10e9/L 5.3    Absolute Lymphocytes Latest Ref Range: 0.8 - 5.3 10e9/L 0.7 (L)   Absolute Monocytes Latest Ref Range: 0.0 - 1.3 10e9/L 0.5   Absolute Eosinophils Latest Ref Range: 0.0 - 0.7 10e9/L 0.0   Absolute Basophils Latest Ref Range: 0.0 - 0.2 10e9/L 0.0   Abs Immature Granulocytes Latest Ref Range: 0 - 0.4 10e9/L 0.0   Absolute Nucleated RBC Unknown 0.0     Results for FIDELIA MIDDLETON (MRN [id number]) as of 9/17/2020 14:37   Ref. Range 9/17/2020 13:50   Sodium Latest Ref Range: 133 - 144 mmol/L 139   Potassium Latest Ref Range: 3.4 - 5.3 mmol/L 3.9   Chloride Latest Ref Range: 94 - 109 mmol/L 104   Carbon Dioxide Latest Ref Range: 20 - 32 mmol/L 28   Urea Nitrogen Latest Ref Range: 7 - 30 mg/dL 19   Creatinine Latest Ref Range: 0.52 - 1.04 mg/dL 0.88   GFR Estimate Latest Ref Range: >60 mL/min/1.73_m2 72   GFR Estimate If Black Latest Ref Range: >60 mL/min/1.73_m2 83   Calcium Latest Ref Range: 8.5 - 10.1 mg/dL 9.8   Anion Gap Latest Ref Range: 3 - 14 mmol/L 7   Albumin Latest Ref Range: 3.4 - 5.0 g/dL 3.8   Protein Total Latest Ref Range: 6.8 - 8.8 g/dL 7.8   Bilirubin Total Latest Ref Range: 0.2 - 1.3 mg/dL 0.6   Alkaline Phosphatase Latest Ref Range: 40 - 150 U/L 83   ALT Latest Ref Range: 0 - 50 U/L 40   AST Latest Ref Range: 0 - 45 U/L 30   CRP Inflammation Latest Ref Range: 0.0 - 8.0 mg/L 20.5 (H)   Glucose Latest Ref Range: 70 - 99 mg/dL 127 (H)       A/P: 60 yo woman with history of rhematoid arthritis and history of stage 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 Lymphoma: Got cycle #1 in the hospital and I chose R-EPOCH for infusional nature due to possible intracardiac involvement and also extensive disease to allow for slower lymphoma kill. Tolerated this well, aside from significant cytopenias (which were present at diagnosis). Then transitioned to R-CHOP to finish out a total of 6 cycles of chemotherapy (1 R-EPOCH and 5 R-CHOP) with initially planned for High Dose MTX on Day  15 of cycle 2,4, and 6 due to high IPI but then transition to prophy IT chemo on cycle 4 and 6 due to toxicity with the high dose methotrexate.   -- PET CT post 3 cycles CR1 and repeat PET CT post therapy completion in 1/2018 showed CR1.   - Final Surveillance Scans in 9/2019 showed ongoing remission    No further scans unless symptoms develop. No sign of disease recurrence        2. ID:  Not on any abx at this point   - Shingrix vaccine booster 9/2019     3. Rheumatoid arthritis: is on prednisone 5 mg daily and plaquenil. Stable. She is interested in trying to taper her prednisone but wants to wait until after her right ankle surgery gets finished     4. CV: A. Fib. Post pacemaker placement.  - post pacemaker  -- Pericardial effusion noted during hospitalization in 3/2018 and pericarditis on 3/8/2018 MRI. Repeat MRI 4/12/2018 showed ongoing pericardial effusion with the start of organization felt to be ongoing inflammation. CT Chest today shows no recurrence of this     5. Heme: Counts good in 12/2019        6. Neuropathy: on gabapentin. Stable. No worsening but no significant improvement either              Final Plan  - cameron and labs in 6 months    Lenore Rodriguez MD

## 2020-09-17 NOTE — PROGRESS NOTES
Oncology Phone Visit  Sep 17, 2020         Disease and Treatment History:  1. Complicated patient with history of Rheumatoid Arthritis status post long term treatment with methotrexate with recent significant complicated course with cardiac arrest, admission with hypotension, sepsis, ICU stay and intubation with subsequent additional readmission from TCU in 6/2017 for FUO with extensive work-up with eventual finding of progression cytopenias with eventual lymphoma diagnosis  2. Bone Marrow Biopsy: 7/12/2017: Highly suspicious for involvement by B cell lymphoma with foci of large atypical CD20+ cells  3. PET CT 7/19/2017: Extensive activity: Right paratracheal lesion with SUV 28, many liver lesions with SUV 15, cardiac lesion intraarterial septum, numerous bone lesions.  4. 7/21 Liver Biopsy: Consistent with Diffuse Large B Cell Lymphoma non-germinal center phenotype  -- FISH for myc, bcl2, bcl6 negative for double-hit lymphoma  5. IPI based on Age < 60 (0 points) + PS 2 (1 + point) + Stage IV Disease (1 point) + Extranodal disease > 1 site (1 point) + elevated LDH (1 point) = 4 Poor Risk Disease  6. Cycle 1 given as R-EPOCH Day 1 = 7/27/2017  -- complications of transfusion dependence and need for acute rehab placement (likely more result of extensive hospital stays)  - LP negative for CNS involvement 8/23/2017  7. Cycle #2: Transition to R-CHOP Day 1 = 8/22/2017  - Day 15 high dose MTX for CNS prophylaxis  - complicated by significant fluid overload and PICC associated DVT  8. Cycle #3 Day 1 = 9/20/2017 Post Cycle 3 PET CR. Cycle 4 10/11/2017 + IT prophylaxis, Cycle 5 11/8/17, Cycle 6 11/29/2017 + IT prophylaxis  -- Post Treatment completion PET 1/15/2018: Complete Remission        HPI:Amelia comes for 6 month follow up today. Since I saw her last on our telemedicine visit she notes she has overall been doing ok. Did have some symptomatic bradycardia and got a pacemaker and better since then. No fevers or  chills, no night sweats, no chest pain, no abdominal pain, no lumps or bumps. Had plans for ankle surgery for contractures on her right ankle        10 point ROS otherwise negative        Physical Exam:  /68   Pulse 86   Temp 98.2  F (36.8  C) (Oral)   Resp 16   Wt 61.9 kg (136 lb 8 oz)   SpO2 99%   BMI 28.53 kg/m    Looks well. KPS 80  HEENT: no icterus or injection, OP clear, no thrush  Lungs: CTAB no crackles or wheezes  CV: RRR with systolic murmur 3/6 LUSB  Abd: soft, NT  Ext: komal stockings, right brace in place    Labs:     Results for FIDELIA MIDDLETON (MRN [id number]) as of 9/17/2020 14:06   Ref. Range 9/17/2020 13:50   WBC Latest Ref Range: 4.0 - 11.0 10e9/L 6.5   Hemoglobin Latest Ref Range: 11.7 - 15.7 g/dL 14.2   Hematocrit Latest Ref Range: 35.0 - 47.0 % 42.0   Platelet Count Latest Ref Range: 150 - 450 10e9/L 152   RBC Count Latest Ref Range: 3.8 - 5.2 10e12/L 3.98   MCV Latest Ref Range: 78 - 100 fl 106 (H)   MCH Latest Ref Range: 26.5 - 33.0 pg 35.7 (H)   MCHC Latest Ref Range: 31.5 - 36.5 g/dL 33.8   RDW Latest Ref Range: 10.0 - 15.0 % 13.0   Diff Method Unknown Automated Method   % Neutrophils Latest Units: % 81.3   % Lymphocytes Latest Units: % 10.3   % Monocytes Latest Units: % 7.1   % Eosinophils Latest Units: % 0.5   % Basophils Latest Units: % 0.5   % Immature Granulocytes Latest Units: % 0.3   Nucleated RBCs Latest Ref Range: 0 /100 0   Absolute Neutrophil Latest Ref Range: 1.6 - 8.3 10e9/L 5.3   Absolute Lymphocytes Latest Ref Range: 0.8 - 5.3 10e9/L 0.7 (L)   Absolute Monocytes Latest Ref Range: 0.0 - 1.3 10e9/L 0.5   Absolute Eosinophils Latest Ref Range: 0.0 - 0.7 10e9/L 0.0   Absolute Basophils Latest Ref Range: 0.0 - 0.2 10e9/L 0.0   Abs Immature Granulocytes Latest Ref Range: 0 - 0.4 10e9/L 0.0   Absolute Nucleated RBC Unknown 0.0     Results for EMI FIDELIA M (MRN [id number]) as of 9/17/2020 14:37   Ref. Range 9/17/2020 13:50   Sodium Latest Ref Range: 133 - 144 mmol/L  139   Potassium Latest Ref Range: 3.4 - 5.3 mmol/L 3.9   Chloride Latest Ref Range: 94 - 109 mmol/L 104   Carbon Dioxide Latest Ref Range: 20 - 32 mmol/L 28   Urea Nitrogen Latest Ref Range: 7 - 30 mg/dL 19   Creatinine Latest Ref Range: 0.52 - 1.04 mg/dL 0.88   GFR Estimate Latest Ref Range: >60 mL/min/1.73_m2 72   GFR Estimate If Black Latest Ref Range: >60 mL/min/1.73_m2 83   Calcium Latest Ref Range: 8.5 - 10.1 mg/dL 9.8   Anion Gap Latest Ref Range: 3 - 14 mmol/L 7   Albumin Latest Ref Range: 3.4 - 5.0 g/dL 3.8   Protein Total Latest Ref Range: 6.8 - 8.8 g/dL 7.8   Bilirubin Total Latest Ref Range: 0.2 - 1.3 mg/dL 0.6   Alkaline Phosphatase Latest Ref Range: 40 - 150 U/L 83   ALT Latest Ref Range: 0 - 50 U/L 40   AST Latest Ref Range: 0 - 45 U/L 30   CRP Inflammation Latest Ref Range: 0.0 - 8.0 mg/L 20.5 (H)   Glucose Latest Ref Range: 70 - 99 mg/dL 127 (H)       A/P: 60 yo woman with history of rhematoid arthritis and history of stage 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 Lymphoma: Got cycle #1 in the hospital and I chose R-EPOCH for infusional nature due to possible intracardiac involvement and also extensive disease to allow for slower lymphoma kill. Tolerated this well, aside from significant cytopenias (which were present at diagnosis). Then transitioned to R-CHOP to finish out a total of 6 cycles of chemotherapy (1 R-EPOCH and 5 R-CHOP) with initially planned for High Dose MTX on Day 15 of cycle 2,4, and 6 due to high IPI but then transition to prophy IT chemo on cycle 4 and 6 due to toxicity with the high dose methotrexate.   -- PET CT post 3 cycles CR1 and repeat PET CT post therapy completion in 1/2018 showed CR1.   - Final Surveillance Scans in 9/2019 showed ongoing remission    No further scans unless symptoms develop. No sign of disease recurrence        2. ID:  Not on any abx at this point   - Shingrix vaccine booster 9/2019     3. Rheumatoid arthritis: is on  prednisone 5 mg daily and plaquenil. Stable. She is interested in trying to taper her prednisone but wants to wait until after her right ankle surgery gets finished     4. CV: A. Fib. Post pacemaker placement.  - post pacemaker  -- Pericardial effusion noted during hospitalization in 3/2018 and pericarditis on 3/8/2018 MRI. Repeat MRI 4/12/2018 showed ongoing pericardial effusion with the start of organization felt to be ongoing inflammation. CT Chest today shows no recurrence of this     5. Heme: Counts good in 12/2019        6. Neuropathy: on gabapentin. Stable. No worsening but no significant improvement either              Final Plan  - [place] and labs in 6 months    Lenore Rodriguez MD

## 2020-09-17 NOTE — LETTER
September 22, 2020      Amelia M Marilu  [address]  [place] [address]        Dear ,    We are writing to inform you of your test results.    Improved CRP inflammation.    Resulted Orders   Erythrocyte sedimentation rate auto   Result Value Ref Range    Sed Rate 16 0 - 30 mm/h   CRP inflammation   Result Value Ref Range    CRP Inflammation 20.5 (H) 0.0 - 8.0 mg/L       If you have any questions or concerns, please call the clinic at the number listed above.       Sincerely,        Maribell Domínguez MD

## 2020-09-18 LAB — IGG SERPL-MCNC: 1490 MG/DL (ref 610–1616)

## 2020-09-25 ENCOUNTER — TRANSFERRED RECORDS (OUTPATIENT)
Dept: HEALTH INFORMATION MANAGEMENT | Facility: CLINIC | Age: 60
End: 2020-09-25

## 2020-09-29 ENCOUNTER — AMBULATORY - HEALTHEAST (OUTPATIENT)
Dept: SURGERY | Facility: CLINIC | Age: 60
End: 2020-09-29

## 2020-09-29 DIAGNOSIS — Z11.59 ENCOUNTER FOR SCREENING FOR OTHER VIRAL DISEASES: ICD-10-CM

## 2020-10-09 ENCOUNTER — ANCILLARY PROCEDURE (OUTPATIENT)
Dept: CARDIOLOGY | Facility: CLINIC | Age: 60
End: 2020-10-09
Attending: INTERNAL MEDICINE
Payer: COMMERCIAL

## 2020-10-09 ENCOUNTER — ANCILLARY PROCEDURE (OUTPATIENT)
Dept: CARDIOLOGY | Facility: CLINIC | Age: 60
End: 2020-10-09
Attending: NURSE PRACTITIONER
Payer: COMMERCIAL

## 2020-10-09 VITALS
DIASTOLIC BLOOD PRESSURE: 81 MMHG | OXYGEN SATURATION: 96 % | BODY MASS INDEX: 28.55 KG/M2 | HEART RATE: 87 BPM | WEIGHT: 136 LBS | SYSTOLIC BLOOD PRESSURE: 120 MMHG | HEIGHT: 58 IN

## 2020-10-09 DIAGNOSIS — Q21.0 VSD (VENTRICULAR SEPTAL DEFECT): ICD-10-CM

## 2020-10-09 DIAGNOSIS — R00.1 BRADYCARDIA: ICD-10-CM

## 2020-10-09 DIAGNOSIS — I47.19 ATRIAL TACHYCARDIA (H): Primary | ICD-10-CM

## 2020-10-09 DIAGNOSIS — I48.92 ATRIAL FLUTTER, UNSPECIFIED TYPE (H): Primary | ICD-10-CM

## 2020-10-09 PROCEDURE — 93306 TTE W/DOPPLER COMPLETE: CPT | Performed by: STUDENT IN AN ORGANIZED HEALTH CARE EDUCATION/TRAINING PROGRAM

## 2020-10-09 PROCEDURE — 99214 OFFICE O/P EST MOD 30 MIN: CPT | Mod: 25 | Performed by: INTERNAL MEDICINE

## 2020-10-09 PROCEDURE — 93010 ELECTROCARDIOGRAM REPORT: CPT | Performed by: INTERNAL MEDICINE

## 2020-10-09 PROCEDURE — 93280 PM DEVICE PROGR EVAL DUAL: CPT | Performed by: INTERNAL MEDICINE

## 2020-10-09 PROCEDURE — 93005 ELECTROCARDIOGRAM TRACING: CPT

## 2020-10-09 PROCEDURE — G0463 HOSPITAL OUTPT CLINIC VISIT: HCPCS | Mod: 25

## 2020-10-09 ASSESSMENT — ENCOUNTER SYMPTOMS
TASTE DISTURBANCE: 0
DOUBLE VISION: 0
LIGHT-HEADEDNESS: 0
PARALYSIS: 0
HOARSE VOICE: 1
LOSS OF CONSCIOUSNESS: 0
STIFFNESS: 1
EYE PAIN: 0
TINGLING: 1
DIZZINESS: 1
FLANK PAIN: 0
MYALGIAS: 1
SINUS CONGESTION: 1
WEAKNESS: 1
BACK PAIN: 0
SPEECH CHANGE: 1
TREMORS: 0
NECK PAIN: 0
SLEEP DISTURBANCES DUE TO BREATHING: 0
MEMORY LOSS: 0
NUMBNESS: 1
MUSCLE CRAMPS: 1
LEG PAIN: 1
EXERCISE INTOLERANCE: 1
DIFFICULTY URINATING: 0
DYSURIA: 0
EYE WATERING: 1
ARTHRALGIAS: 1
PALPITATIONS: 1
HYPOTENSION: 0
NECK MASS: 0
HEADACHES: 0
ORTHOPNEA: 0
SINUS PAIN: 0
SYNCOPE: 0
EYE IRRITATION: 1
HEMATURIA: 0
SEIZURES: 0
SORE THROAT: 0
EYE REDNESS: 1
SMELL DISTURBANCE: 0
JOINT SWELLING: 1
DISTURBANCES IN COORDINATION: 1
HYPERTENSION: 0
MUSCLE WEAKNESS: 1
TROUBLE SWALLOWING: 0

## 2020-10-09 ASSESSMENT — PAIN SCALES - GENERAL: PAINLEVEL: NO PAIN (0)

## 2020-10-09 ASSESSMENT — MIFFLIN-ST. JEOR: SCORE: 1081.64

## 2020-10-09 NOTE — NURSING NOTE
Cardiac Testing: Patient given instructions regarding  echocardiogram . Discussed purpose, preparation, procedure and when to expect results reported back to the patient. Patient demonstrated understanding of this information and agreed to call with further questions or concerns.    Diet: Patient instructed regarding a heart healthy diet, including discussion of reduced fat and sodium intake. Patient demonstrated understanding of this information and agreed to call with further questions or concerns.    Med Reconcile: Reviewed and verified all current medications with the patient. The updated medication list was printed and given to the patient.    Return Appointment: Patient given instructions regarding scheduling next clinic visit. Patient demonstrated understanding of this information and agreed to call with further questions or concerns.    Patient stated she understood all health information given and agreed to call with further questions or concerns.    Kelly Rushing, MSN, RN, CNL  Cardiology Care Coordinator  [facility]  [phone]

## 2020-10-09 NOTE — PROGRESS NOTES
Electrophysiology Clinic Note  HPI:   Ms. Michel is a 59 year old female who has a past medical history significant for VSD s/p repair 1969, RA, HTN, insomnia, atrial tachyarrhythmias, cardiac arrest, SSS s/p PPM 12/2019,  DLBCL, and exudative pericardial effusion.      She was admitted from 5/15/17-5/23/17 with atrial tachyarrhythmias (SVT, AF, AFL) with symptoms of palpitations, fatigue, and nausea. An echo showed LVEF 40-45%, mildly reduced RVEF, moderate biatrial enlargement, mild mitral regurgitation, and moderate tricuspid regurgitation. . She was started on Eliquis and underwent a BRE/DCCV on 5/17/17 c/b hypotension and recurrent arrhythmia.She was started on Sotalol and chemically cardioverted. However, she had nausea and thought it was from the Sotalol so this was stopped. She reverted back to AF/AFL and a rate control strategy was adopted which was difficult given symptoms so she started amiodarone. CMRI  showed LVEF 55%, mildly dilated RV with focal area of dyskinesis in RVOT, severe bi-atrial enlargement, severe TR, mild/moderate MR, and DE at RV septal insertion site. RFA was discussed but was defered as patient had a UTI at the time with ESBL.     She was readmitted on 6/19/17-8/4/17 after suffering an out of hospital cardiac arrest.  Upon EMS arrival, she was in VF. She was externally defibrillated and had ROSC in the field. She was intubated and brought to [facility] found to be in escape rhythm 43 bpm. She was taken emergently to cath lab where coronary angiogram showed normal coronary arteries. Temporary pacemaker was placed in RA given sinus arrest. She was also placed on therapeutic hypothermia. She had been having nausea, diarrhea, and intermittent vomiting over the last week and generally had not been feeling well over the last month and also had saddle anesthesia with lower extremity numbness that had been evaluated by neuro as an outpatient.  Ultimately found to have DLBCL started on chemo cycles. 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 Lymphoma and was eventually discharged to TCU on a lifevest.     Her DLBCL was treated with R-EPOCH for one cycle while in the hospital complicated by cytopenias followed by 5 cycles of R-CHOP finished the cycles in December of 2017 currently on surveillance scans. She had a pericardial effusion for which she is on colchicine which was discontinued at the end of 6/2018.      She has then followed intermittently with EP in clinic. She had some recurrent AF in 4/2018 requiring ED visit with DCCV with recurrence and another DCCV. We restarted digoxin 4/4/18 after which she had one episode of AF s/p DCCV and has since maintained sinus rhythm and reported good symptomatic control. She was seen in 5/2018 and reported having a feeling of her heart \"rolling\" daily lasting about about 10 sec at the most. The last time she required DCCV was in 4/12/2018. Her cMRI had shown some organization of her [ericardial effusion) and she has been initiated on colchicine. Chest CT in 9/2018 showed no evidence of lymphoma recurrence and improved pericardial effusion. Last EP follow up was in 2/2019 and she was doing well without issues.    She then presented 12/12/2019 with 1 day history palpitations, headaches, and nausea. She also endorses some dizziness when walking but not at rest. She was found to be bradycardic with intermittent junctional rhythm into 30's. Electrolytes and renal function stable. Her atenolol and digoxin were held. She continued to have junctional/bradycardia and decision was made to pursue PPM.    EP Visit 4/10/20: She presents today for follow up. She reports feeling well. She states she does have intermittent episodes of palpitations, that have not been overly bothersome to her. Device site well healed. She denies any chest pain/pressures, dizziness, lightheadedness, worsening shortness of breath, leg/ankle swelling, PND, " orthopnea, or syncopal symptoms. Device site is reported well healed. Device transmission shows presenting rhythm is AP/VS @ 60 bpm. 1 NSVT, 6 Fast A&V and 272 AF episodes recorded. All EGMs show AF/AT with RVR. AF burden= 75.3%. She is taking eliquis. Normal device function. AP= 26% and = 13%. No short V-V intervals recorded. Lead trends appear stable. Battery estimates 14.2 years to OZZY. Current cardiac medications include: Elqiuis, Atenolol, Digoxin, Lasix, and Spironolactone.     She presents today for follow up. She reports feeling well. She denies any chest pain/pressures, dizziness, lightheadedness, worsening shortness of breath, leg/ankle swelling, PND, orthopnea, palpitations, or syncopal symptoms. Device interrogation shows normal pacemaker function. 2 Fast A&V and 1 NSVT episodes recorded since last remote transmission. Intrinsic rhythm = Atrial Flutter, w/ VS @ 88 bpm. AF burden = 85.9%. Pt is on Eliquis. AP = 16.7%.  = 23.6%.  Estimated battery longevity to OZZY = 13.6 years. No short v-v intervals recorded. Lead trends appear stable. Presenting 12 lead ECG shows AF/AFL and  Vent Rate 69 bpm,  ms, QTc 473 ms. Echo shows LVEF 60-65%, mildly dilated RV with normal function, moderate TR, unchanged from prior echo. Current cardiac medications include: Elqiuis, Atenolol, Digoxin, Lasix, and Spironolactone.       PAST MEDICAL HISTORY:  Past Medical History:   Diagnosis Date     Arthritis      Cardiac arrest (H)      History of chemotherapy      Hypertension      Neuropathy        CURRENT MEDICATIONS:  Current Outpatient Medications   Medication Sig Dispense Refill     acetaminophen (TYLENOL) 500 MG tablet Take 500 mg by mouth every 6 hours as needed for mild pain       alendronate (FOSAMAX) 70 MG tablet Take 1 tablet (70 mg) by mouth every 7 days On Monday 12 tablet 1     apixaban ANTICOAGULANT (ELIQUIS ANTICOAGULANT) 5 MG tablet Take 1 tablet (5 mg) by mouth 2 times daily 180 tablet 3      atenolol (TENORMIN) 25 MG tablet TAKE 1 TABLET BY MOUTH TWICE DAILY 180 tablet 3     calcium carbonate 600 mg-vitamin D 400 units (CALTRATE) 600-400 MG-UNIT per tablet Take 2 tablets by mouth daily       digoxin (LANOXIN) 125 MCG tablet Take 1 tablet (125 mcg) by mouth daily 90 tablet 3     furosemide (LASIX) 20 MG tablet Take 1 tablet (20 mg) by mouth daily 90 tablet 3     gabapentin (NEURONTIN) 100 MG capsule Take 2 capsules (200 mg) by mouth 3 times daily 540 capsule 3     hydroxychloroquine (PLAQUENIL) 200 MG tablet Take 1 tablet (200 mg) by mouth daily 90 tablet 3     magnesium 250 MG tablet Take 1 tablet by mouth At Bedtime       multivitamin, therapeutic with minerals (THERA-VIT-M) TABS tablet Take 1 tablet by mouth daily 30 each 0     predniSONE (DELTASONE) 5 MG tablet Take 1 tablet (5 mg) by mouth daily 90 tablet 3     spironolactone (ALDACTONE) 25 MG tablet Take 25 mg by mouth daily       STATIN NOT PRESCRIBED (INTENTIONAL) Please choose reason not prescribed, below       tolterodine ER (DETROL LA) 4 MG 24 hr capsule Take 1 capsule (4 mg) by mouth daily 90 capsule 3       PAST SURGICAL HISTORY:  Past Surgical History:   Procedure Laterality Date     ANESTHESIA CARDIOVERSION N/A 5/17/2017    Procedure: ANESTHESIA CARDIOVERSION;  ANESTHESIA CARDIOVERSION;  Surgeon: GENERIC ANESTHESIA PROVIDER;  Location: [facility]     ANESTHESIA CARDIOVERSION  3/12/2018    Procedure: ANESTHESIA CARDIOVERSION;;  Surgeon: GENERIC ANESTHESIA PROVIDER;  Location: [facility]     ANESTHESIA CARDIOVERSION N/A 3/23/2018    Procedure: ANESTHESIA CARDIOVERSION;  Anesthesia Cardioversion ;  Surgeon: GENERIC ANESTHESIA PROVIDER;  Location: [facility]     ANESTHESIA CARDIOVERSION N/A 4/12/2018    Procedure: ANESTHESIA CARDIOVERSION;  Cardioversion;  Surgeon: GENERIC ANESTHESIA PROVIDER;  Location: [facility]     ARTHRODESIS WRIST  2000    Right wrist     BONE MARROW ASPIRATE &BIOPSY  7/12/2017          COLONOSCOPY N/A 11/19/2019    Procedure: Colonoscopy,  With Polypectomy And Biopsy;  Surgeon: Pj Vasques MD;  Location: [facility]     EP PACEMAKER N/A 12/13/2019    Procedure: EP Pacemaker;  Surgeon: Pj Trent MD;  Location:  [facility]     EXCISE LESION UPPER EXTREMITY Left 5/22/2018    Procedure: EXCISE LESION UPPER EXTREMITY;  Left Arm Wound Excision And Closure, Possible Submuscular Transposition of Ulnar Nerve  (Choice Anesthesia);  Surgeon: Kevin Sheldon MD;  [facility]     FOOT SURGERY      4 left and 2 right     RELEASE CARPAL TUNNEL BILATERAL       REPAIR VENTRICULAR SEPTAL DEFECT  1969     TRANSPOSITION ULNAR NERVE (ELBOW) Left 5/22/2018    Procedure: TRANSPOSITION ULNAR NERVE (ELBOW);;  Surgeon: Kevin Sheldon MD;  [facility]       ALLERGIES:     Allergies   Allergen Reactions     Amiodarone Other (See Comments) and Difficulty breathing     Lethargic and had trouble breathing- occurred in 2017     Blood Transfusion Related (Informational Only) Hives     Hives with platelets. Give benadryl premedication.     Metoprolol Other (See Comments)     Pt and  report that metoprolol does not work for her and also reports feeling unwell with this medication. She has been able to tolerate atenolol, which as worked in controlling her HR.      No Clinical Screening - See Comments      Penicillin Allergy Skin Test not performed, see antimicrobial management team progress note 7/5/17.       Penicillins      Tape [Adhesive Tape] Rash       FAMILY HISTORY:  Family History   Problem Relation Age of Onset     Breast Cancer Mother      Hypertension Mother      Alzheimer Disease Mother      Cerebrovascular Disease Mother      Hypertension Father      Cerebrovascular Disease Father      Atrial fibrillation Father      Lymphoma Father      Prostate Cancer Father      Diabetes Paternal Grandmother      Alzheimer Disease Maternal Grandmother         likely     Arthritis Maternal Grandfather      Pneumonia Maternal Grandfather        SOCIAL  "HISTORY:  Social History     Tobacco Use     Smoking status: Never Smoker     Smokeless tobacco: Never Used   Substance Use Topics     Alcohol use: Yes     Comment: none     Drug use: No       ROS:   A comprehensive 10 point review of systems negative other than as mentioned in HPI.  Exam:  /81 (BP Location: Right arm, Patient Position: Chair, Cuff Size: Adult Regular)   Pulse 87   Ht 1.473 m (4' 10\")   Wt 61.7 kg (136 lb)   SpO2 96%   BMI 28.42 kg/m    GENERAL APPEARANCE: alert and no distress  HEENT: no icterus, no xanthelasmas, normal pupil size and reaction, normal palate, mucosa moist, no central cyanosis  NECK: Supple.  RESPIRATORY: lungs clear to auscultation - no rales, rhonchi or wheezes, no use of accessory muscles, no retractions, respirations are unlabored, normal respiratory rate  CARDIOVASCULAR:Irregular, soft murmur.  ABDOMEN: soft, non tender, bowel sounds normal, non-distended  EXTREMITIES: peripheral pulses normal, no edema  NEURO: alert and oriented to person/place/time, normal speech, gait and affect  SKIN: no ecchymoses, no rashes  PSYCH: normal affect, cooperative    Labs:  Reviewed.     Testing/Procedures:  9/11/17 ECHOCARDIOGRAM:   Interpretation Summary  Left ventricular function, chamber size, wall motion, and wall thickness are  normal.The EF is 60-65% (traced 60%.) GLS -18%.  The right ventricle is normal size and normal in function. The RV is mildly  dilated.  Moderate tricuspid insufficiency is present.  Mild pulmonary hypertension is present (esimated PASP 55 mmHg including RA  pressure.)  Dilation of the inferior vena cava is present with abnormal respiratory  variation in diameter (esitimated RAP 15 mmHg.)  This study was compared with the study from 8/31/17: TR appears slightly  decreased from the earlier study.     7/24/17 CMRI:     1. The LV is normal in cavity size and wall thickness. The global systolic function is normal. The LVEF is  64%. There are no regional wall " motion abnormalities. No evidence of myocardial iron overload.      2. The RV is normal in cavity size. The global systolic function is normal. The RVEF is 59%.      3. There is moderate dilation of bilateral atria.      4. Tricuspid regurgitation is present, difficult to quantify due to image quality.      5. Late gadolinium enhancement imaging shows RV insertion site hyperenhancement, a non-specific finding  seen in patients with RV volume/pressure overload.      6. There is a moderate right pleural effusion and small left pleural effusion.      CONCLUSIONS: Normal Bi-Ventricular systolic function, LVEF 64% and RVE 59%. There is no evidence of  infiltrative disease. Compared to the MRI from 5/15/2017, there is no significant change.      7/2017 BRE:  Interpretation Summary  There is a small mass (0.7 cm by 0.2 cm) on the ventricular side of the right  coronary cusp. There is a second mass (0.4 cm by 0.2 cm) observed in the LVOT,  attached to the mitro-aortic curtain. There is a third mass on the noncoronary  cusp that measures 0.4 cm by 0.2 cm that could be a healing vegetation. These  findings are compatible with endocarditis if clinical picture supports.  Right ventricular function, chamber size, wall motion, and thickness are  normal.  This study was compared with the study from 7/10/2017.  There is detection of masses on the aortic valve and LVOT.     4/12/18 CMRI:  1. The LV is normal in cavity size and wall thickness. The global systolic function is normal. The LVEF is  54%. There are no regional wall motion abnormalities.     2. The RV is mildly dilated in cavity size. The global systolic function is normal. The RVEF is 49%.      3. Both atria are severely enlarged.     4. There is at least moderate, possibly severe tricuspid regurgitation.      5. Late gadolinium enhancement imaging shows hyperenhancement in the RV insertion site consistent with RV  pressure/volume overload.      6. There is a loculated  pericardial effusion along the basal lateral and basal to mid inferior LV walls,  and along the inferior RV wall. it appears exudative in nature, and is beginning to organize. There is  diffuse pericardial enhancement.     7. There is no intracardiac thrombus or mass.     8. There is a small right pleural effusion.     9/26/18 CHEST CT:                                                                   IMPRESSION: In this patient with a history of lymphoma:  1. No evidence of disease recurrence, consistent with complete  response per Lugano criteria.  2. Stable cardiomegaly. Improved pericardial effusion.  3. Early contrast phase with heterogeneous visceral enhancement in the  abdomen, likely secondary to cardiac dysfunction.    10/9/20 ECHOCARDIOGRAM:  Interpretation Summary  VSD s/p repair in 1969  Global and regional left ventricular function is normal with an EF of 60-65%.  No flow acceleration is seen across the septum.  Global right ventricular function is normal. Mild right ventricular dilation  is present.  There is moderate tricuspid regurgitation.  The estimated PA systolic pressure is 32 mmHg but this is likely to be  underestimated due to the presence of multiple jets.  This study was compared with the study from 09/11/2017. There has been no  change in the severity of the tricuspid regurgitation or the RV size/function.    Assessment and Plan:   Ms. Michel is a 59 year old female who has a past medical history significant for VSD s/p repair 1969, RA, HTN, insomnia, atrial tachyarrhythmias, cardiac arrest, SSS s/p PPM 12/2019,  DLBCL, and exudative pericardial effusion. She presents today for follow up. She reports feeling well. She denies any chest pain/pressures, dizziness, lightheadedness, worsening shortness of breath, leg/ankle swelling, PND, orthopnea, palpitations, or syncopal symptoms. Device interrogation shows normal pacemaker function. 2 Fast A&V and 1 NSVT episodes recorded since last remote  transmission. Intrinsic rhythm = Atrial Flutter, w/ VS @ 88 bpm. AF burden = 85.9%. Pt is on Eliquis. AP = 16.7%.  = 23.6%.  Estimated battery longevity to OZZY = 13.6 years. No short v-v intervals recorded. Lead trends appear stable. Presenting 12 lead ECG shows AF/AFL and  Vent Rate 69 bpm,  ms, QTc 473 ms. Echo shows LVEF 60-65%, mildly dilated RV with normal function, moderate TR, unchanged from prior echo. Current cardiac medications include: Elqiuis, Atenolol, Digoxin, Lasix, and Spironolactone.     Sick Sinus Syndrome:   Atrial Fibrillation:  1. Stroke Prophylaxis:  CHADSVASC=+HTN, +gender  2, corresponding to a 2.2% annual stroke / systemic emolism event rate. indicating need for long term oral anticoagulation.  She is on Eliquis. No bleeding issues.   2. Rate Control: Continue Atenolol. Stop digoxin today as rate controlled and  back in AF/AF .    3. Rhythm Control: Cardioversion, Antiarrhythmics and/or ablation are options for rhythm control. She has had several DCCV last in 4/12/19. Notably, she had possible side effects from Sotalol (however, likely was due to underlying illness at the time and not from medication since she was found to have DBCL).  She is currently in an AFL but is asymptomatic. Will need to follow LVEF, if concern for any tachy-mediated issues then would need to consider treatment. For now, we will not add any further rhythm control measures.   4. Had tachy-shelton syndrome and underwent PPM implant in 12/2019. Normal device function with stable lead parameters. She will have continued follow up with Device clinic per routine.      Exudative pericardial effusion:  - last noted in cMRI 4/12/18 beginning to organize with diffuse pericardial enhancement. Follow up chest CT showed improved pericardial effusion. Asymptomatic with no shortness of breath or signs or symptoms of constriction.   - had a course of colchicine  - stable on chest CT in 3/2019  - no issues on recent  echo    Follow up in 1 year with an echo.    The patient states understanding and is agreeable with plan.   Juan Eaton MD [facility]  Cardiology - Electrophysiology

## 2020-10-09 NOTE — LETTER
10/9/2020      RE: Amelia Michel  [address]  [facility] [address]       Dear Colleague,    Thank you for the opportunity to participate in the care of your patient, Amelia Michel, at the [facility] HEART [facility] at [facility]. Please see a copy of my visit note below.    Electrophysiology Clinic Note  HPI:   Ms. Michel is a 59 year old female who has a past medical history significant for VSD s/p repair 1969, RA, HTN, insomnia, atrial tachyarrhythmias, cardiac arrest, SSS s/p PPM 12/2019,  DLBCL, and exudative pericardial effusion.      She was admitted from 5/15/17-5/23/17 with atrial tachyarrhythmias (SVT, AF, AFL) with symptoms of palpitations, fatigue, and nausea. An echo showed LVEF 40-45%, mildly reduced RVEF, moderate biatrial enlargement, mild mitral regurgitation, and moderate tricuspid regurgitation. . She was started on Eliquis and underwent a BRE/DCCV on 5/17/17 c/b hypotension and recurrent arrhythmia.She was started on Sotalol and chemically cardioverted. However, she had nausea and thought it was from the Sotalol so this was stopped. She reverted back to AF/AFL and a rate control strategy was adopted which was difficult given symptoms so she started amiodarone. CMRI  showed LVEF 55%, mildly dilated RV with focal area of dyskinesis in RVOT, severe bi-atrial enlargement, severe TR, mild/moderate MR, and DE at RV septal insertion site. RFA was discussed but was defered as patient had a UTI at the time with ESBL.     She was readmitted on 6/19/17-8/4/17 after suffering an out of hospital cardiac arrest.  Upon EMS arrival, she was in VF. She was externally defibrillated and had ROSC in the field. She was intubated and brought to [facility] found to be in escape rhythm 43 bpm. She was taken emergently to cath lab where coronary angiogram showed normal coronary arteries. Temporary pacemaker was placed in RA given sinus arrest. She was also placed on  "therapeutic hypothermia. She had been having nausea, diarrhea, and intermittent vomiting over the last week and generally had not been feeling well over the last month and also had saddle anesthesia with lower extremity numbness that had been evaluated by neuro as an outpatient.  Ultimately found to have DLBCL started on chemo cycles. 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     Her DLBCL was treated with R-EPOCH for one cycle while in the hospital complicated by cytopenias followed by 5 cycles of R-CHOP finished the cycles in December of 2017 currently on surveillance scans. She had a pericardial effusion for which she is on colchicine which was discontinued at the end of 6/2018.      She has then followed intermittently with EP in clinic. She had some recurrent AF in 4/2018 requiring ED visit with DCCV with recurrence and another DCCV. We restarted digoxin 4/4/18 after which she had one episode of AF s/p DCCV and has since maintained sinus rhythm and reported good symptomatic control. She was seen in 5/2018 and reported having a feeling of her heart \"rolling\" daily lasting about about 10 sec at the most. The last time she required DCCV was in 4/12/2018. Her cMRI had shown some organization of her [ericardial effusion) and she has been initiated on colchicine. Chest CT in 9/2018 showed no evidence of lymphoma recurrence and improved pericardial effusion. Last EP follow up was in 2/2019 and she was doing well without issues.    She then presented 12/12/2019 with 1 day history palpitations, headaches, and nausea. She also endorses some dizziness when walking but not at rest. She was found to be bradycardic with intermittent junctional rhythm into 30's. Electrolytes and renal function stable. Her atenolol and digoxin were held. She continued to have junctional/bradycardia and decision was made to pursue " PPM.    EP Visit 4/10/20: She presents today for follow up. She reports feeling well. She states she does have intermittent episodes of palpitations, that have not been overly bothersome to her. Device site well healed. She denies any chest pain/pressures, dizziness, lightheadedness, worsening shortness of breath, leg/ankle swelling, PND, orthopnea, or syncopal symptoms. Device site is reported well healed. Device transmission shows presenting rhythm is AP/VS @ 60 bpm. 1 NSVT, 6 Fast A&V and 272 AF episodes recorded. All EGMs show AF/AT with RVR. AF burden= 75.3%. She is taking eliquis. Normal device function. AP= 26% and = 13%. No short V-V intervals recorded. Lead trends appear stable. Battery estimates 14.2 years to OZZY. Current cardiac medications include: Elqiuis, Atenolol, Digoxin, Lasix, and Spironolactone.     She presents today for follow up. She reports feeling well. She denies any chest pain/pressures, dizziness, lightheadedness, worsening shortness of breath, leg/ankle swelling, PND, orthopnea, palpitations, or syncopal symptoms. Device interrogation shows normal pacemaker function. 2 Fast A&V and 1 NSVT episodes recorded since last remote transmission. Intrinsic rhythm = Atrial Flutter, w/ VS @ 88 bpm. AF burden = 85.9%. Pt is on Eliquis. AP = 16.7%.  = 23.6%.  Estimated battery longevity to OZZY = 13.6 years. No short v-v intervals recorded. Lead trends appear stable. Presenting 12 lead ECG shows AF/AFL and  Vent Rate 69 bpm,  ms, QTc 473 ms. Echo shows LVEF 60-65%, mildly dilated RV with normal function, moderate TR, unchanged from prior echo. Current cardiac medications include: Elqiuis, Atenolol, Digoxin, Lasix, and Spironolactone.       PAST MEDICAL HISTORY:  Past Medical History:   Diagnosis Date     Arthritis      Cardiac arrest (H)      History of chemotherapy      Hypertension      Neuropathy        CURRENT MEDICATIONS:  Current Outpatient Medications   Medication Sig Dispense  Refill     acetaminophen (TYLENOL) 500 MG tablet Take 500 mg by mouth every 6 hours as needed for mild pain       alendronate (FOSAMAX) 70 MG tablet Take 1 tablet (70 mg) by mouth every 7 days On Monday 12 tablet 1     apixaban ANTICOAGULANT (ELIQUIS ANTICOAGULANT) 5 MG tablet Take 1 tablet (5 mg) by mouth 2 times daily 180 tablet 3     atenolol (TENORMIN) 25 MG tablet TAKE 1 TABLET BY MOUTH TWICE DAILY 180 tablet 3     calcium carbonate 600 mg-vitamin D 400 units (CALTRATE) 600-400 MG-UNIT per tablet Take 2 tablets by mouth daily       digoxin (LANOXIN) 125 MCG tablet Take 1 tablet (125 mcg) by mouth daily 90 tablet 3     furosemide (LASIX) 20 MG tablet Take 1 tablet (20 mg) by mouth daily 90 tablet 3     gabapentin (NEURONTIN) 100 MG capsule Take 2 capsules (200 mg) by mouth 3 times daily 540 capsule 3     hydroxychloroquine (PLAQUENIL) 200 MG tablet Take 1 tablet (200 mg) by mouth daily 90 tablet 3     magnesium 250 MG tablet Take 1 tablet by mouth At Bedtime       multivitamin, therapeutic with minerals (THERA-VIT-M) TABS tablet Take 1 tablet by mouth daily 30 each 0     predniSONE (DELTASONE) 5 MG tablet Take 1 tablet (5 mg) by mouth daily 90 tablet 3     spironolactone (ALDACTONE) 25 MG tablet Take 25 mg by mouth daily       STATIN NOT PRESCRIBED (INTENTIONAL) Please choose reason not prescribed, below       tolterodine ER (DETROL LA) 4 MG 24 hr capsule Take 1 capsule (4 mg) by mouth daily 90 capsule 3       PAST SURGICAL HISTORY:  Past Surgical History:   Procedure Laterality Date     ANESTHESIA CARDIOVERSION N/A 5/17/2017    Procedure: ANESTHESIA CARDIOVERSION;  ANESTHESIA CARDIOVERSION;  Surgeon: GENERIC ANESTHESIA PROVIDER;  Location: [facility]     ANESTHESIA CARDIOVERSION  3/12/2018    Procedure: ANESTHESIA CARDIOVERSION;;  Surgeon: GENERIC ANESTHESIA PROVIDER;  Location: [facility]     ANESTHESIA CARDIOVERSION N/A 3/23/2018    Procedure: ANESTHESIA CARDIOVERSION;  Anesthesia Cardioversion ;  Surgeon: GENERIC  ANESTHESIA PROVIDER;  Location: [facility] OR     ANESTHESIA CARDIOVERSION N/A 4/12/2018    Procedure: ANESTHESIA CARDIOVERSION;  Cardioversion;  Surgeon: GENERIC ANESTHESIA PROVIDER;  Location: [facility] OR     ARTHRODESIS WRIST  2000    Right wrist     BONE MARROW ASPIRATE &BIOPSY  7/12/2017          COLONOSCOPY N/A 11/19/2019    Procedure: Colonoscopy, With Polypectomy And Biopsy;  Surgeon: Pj Vasques MD;  Location: [facility]     EP PACEMAKER N/A 12/13/2019    Procedure: EP Pacemaker;  Surgeon: Pj Trent MD;  Location:  HEART CARDIAC CATH LAB     EXCISE LESION UPPER EXTREMITY Left 5/22/2018    Procedure: EXCISE LESION UPPER EXTREMITY;  Left Arm Wound Excision And Closure, Possible Submuscular Transposition of Ulnar Nerve  (Choice Anesthesia);  Surgeon: Kevin Sheldon MD;  Location:  OR     FOOT SURGERY      4 left and 2 right     RELEASE CARPAL TUNNEL BILATERAL       REPAIR VENTRICULAR SEPTAL DEFECT  1969     TRANSPOSITION ULNAR NERVE (ELBOW) Left 5/22/2018    Procedure: TRANSPOSITION ULNAR NERVE (ELBOW);;  Surgeon: Kevin Sheldon MD;  Location:  OR       ALLERGIES:     Allergies   Allergen Reactions     Amiodarone Other (See Comments) and Difficulty breathing     Lethargic and had trouble breathing- occurred in 2017     Blood Transfusion Related (Informational Only) Hives     Hives with platelets. Give benadryl premedication.     Metoprolol Other (See Comments)     Pt and  report that metoprolol does not work for her and also reports feeling unwell with this medication. She has been able to tolerate atenolol, which as worked in controlling her HR.      No Clinical Screening - See Comments      Penicillin Allergy Skin Test not performed, see antimicrobial management team progress note 7/5/17.       Penicillins      Tape [Adhesive Tape] Rash       FAMILY HISTORY:  Family History   Problem Relation Age of Onset     Breast Cancer Mother      Hypertension Mother      Alzheimer Disease Mother       "Cerebrovascular Disease Mother      Hypertension Father      Cerebrovascular Disease Father      Atrial fibrillation Father      Lymphoma Father      Prostate Cancer Father      Diabetes Paternal Grandmother      Alzheimer Disease Maternal Grandmother         likely     Arthritis Maternal Grandfather      Pneumonia Maternal Grandfather        SOCIAL HISTORY:  Social History     Tobacco Use     Smoking status: Never Smoker     Smokeless tobacco: Never Used   Substance Use Topics     Alcohol use: Yes     Comment: none     Drug use: No       ROS:   A comprehensive 10 point review of systems negative other than as mentioned in HPI.  Exam:  /81 (BP Location: Right arm, Patient Position: Chair, Cuff Size: Adult Regular)   Pulse 87   Ht 1.473 m (4' 10\")   Wt 61.7 kg (136 lb)   SpO2 96%   BMI 28.42 kg/m    GENERAL APPEARANCE: alert and no distress  HEENT: no icterus, no xanthelasmas, normal pupil size and reaction, normal palate, mucosa moist, no central cyanosis  NECK: Supple.  RESPIRATORY: lungs clear to auscultation - no rales, rhonchi or wheezes, no use of accessory muscles, no retractions, respirations are unlabored, normal respiratory rate  CARDIOVASCULAR:Irregular, soft murmur.  ABDOMEN: soft, non tender, bowel sounds normal, non-distended  EXTREMITIES: peripheral pulses normal, no edema  NEURO: alert and oriented to person/place/time, normal speech, gait and affect  SKIN: no ecchymoses, no rashes  PSYCH: normal affect, cooperative    Labs:  Reviewed.     Testing/Procedures:  9/11/17 ECHOCARDIOGRAM:   Interpretation Summary  Left ventricular function, chamber size, wall motion, and wall thickness are  normal.The EF is 60-65% (traced 60%.) GLS -18%.  The right ventricle is normal size and normal in function. The RV is mildly  dilated.  Moderate tricuspid insufficiency is present.  Mild pulmonary hypertension is present (esimated PASP 55 mmHg including RA  pressure.)  Dilation of the inferior vena cava is " present with abnormal respiratory  variation in diameter (esitimated RAP 15 mmHg.)  This study was compared with the study from 8/31/17: TR appears slightly  decreased from the earlier study.     7/24/17 CMRI:     1. The LV is normal in cavity size and wall thickness. The global systolic function is normal. The LVEF is  64%. There are no regional wall motion abnormalities. No evidence of myocardial iron overload.      2. The RV is normal in cavity size. The global systolic function is normal. The RVEF is 59%.      3. There is moderate dilation of bilateral atria.      4. Tricuspid regurgitation is present, difficult to quantify due to image quality.      5. Late gadolinium enhancement imaging shows RV insertion site hyperenhancement, a non-specific finding  seen in patients with RV volume/pressure overload.      6. There is a moderate right pleural effusion and small left pleural effusion.      CONCLUSIONS: Normal Bi-Ventricular systolic function, LVEF 64% and RVE 59%. There is no evidence of  infiltrative disease. Compared to the MRI from 5/15/2017, there is no significant change.      7/2017 BRE:  Interpretation Summary  There is a small mass (0.7 cm by 0.2 cm) on the ventricular side of the right  coronary cusp. There is a second mass (0.4 cm by 0.2 cm) observed in the LVOT,  attached to the mitro-aortic curtain. There is a third mass on the noncoronary  cusp that measures 0.4 cm by 0.2 cm that could be a healing vegetation. These  findings are compatible with endocarditis if clinical picture supports.  Right ventricular function, chamber size, wall motion, and thickness are  normal.  This study was compared with the study from 7/10/2017.  There is detection of masses on the aortic valve and LVOT.     4/12/18 CMRI:  1. The LV is normal in cavity size and wall thickness. The global systolic function is normal. The LVEF is  54%. There are no regional wall motion abnormalities.     2. The RV is mildly dilated in  cavity size. The global systolic function is normal. The RVEF is 49%.      3. Both atria are severely enlarged.     4. There is at least moderate, possibly severe tricuspid regurgitation.      5. Late gadolinium enhancement imaging shows hyperenhancement in the RV insertion site consistent with RV  pressure/volume overload.      6. There is a loculated pericardial effusion along the basal lateral and basal to mid inferior LV walls,  and along the inferior RV wall. it appears exudative in nature, and is beginning to organize. There is  diffuse pericardial enhancement.     7. There is no intracardiac thrombus or mass.     8. There is a small right pleural effusion.     9/26/18 CHEST CT:                                                                   IMPRESSION: In this patient with a history of lymphoma:  1. No evidence of disease recurrence, consistent with complete  response per Lugano criteria.  2. Stable cardiomegaly. Improved pericardial effusion.  3. Early contrast phase with heterogeneous visceral enhancement in the  abdomen, likely secondary to cardiac dysfunction.    10/9/20 ECHOCARDIOGRAM:  Interpretation Summary  VSD s/p repair in 1969  Global and regional left ventricular function is normal with an EF of 60-65%.  No flow acceleration is seen across the septum.  Global right ventricular function is normal. Mild right ventricular dilation  is present.  There is moderate tricuspid regurgitation.  The estimated PA systolic pressure is 32 mmHg but this is likely to be  underestimated due to the presence of multiple jets.  This study was compared with the study from 09/11/2017. There has been no  change in the severity of the tricuspid regurgitation or the RV size/function.    Assessment and Plan:   Ms. Michel is a 59 year old female who has a past medical history significant for VSD s/p repair 1969, RA, HTN, insomnia, atrial tachyarrhythmias, cardiac arrest, SSS s/p PPM 12/2019,  DLBCL, and exudative  pericardial effusion. She presents today for follow up. She reports feeling well. She denies any chest pain/pressures, dizziness, lightheadedness, worsening shortness of breath, leg/ankle swelling, PND, orthopnea, palpitations, or syncopal symptoms. Device interrogation shows normal pacemaker function. 2 Fast A&V and 1 NSVT episodes recorded since last remote transmission. Intrinsic rhythm = Atrial Flutter, w/ VS @ 88 bpm. AF burden = 85.9%. Pt is on Eliquis. AP = 16.7%.  = 23.6%.  Estimated battery longevity to OZZY = 13.6 years. No short v-v intervals recorded. Lead trends appear stable. Presenting 12 lead ECG shows AF/AFL and  Vent Rate 69 bpm,  ms, QTc 473 ms. Echo shows LVEF 60-65%, mildly dilated RV with normal function, moderate TR, unchanged from prior echo. Current cardiac medications include: Elqiuis, Atenolol, Digoxin, Lasix, and Spironolactone.     Sick Sinus Syndrome:   Atrial Fibrillation:  1. Stroke Prophylaxis:  CHADSVASC=+HTN, +gender  2, corresponding to a 2.2% annual [facility]. indicating need for long term oral anticoagulation.  She is on Eliquis. No bleeding issues.   2. Rate Control: Continue Atenolol. Stop digoxin today as rate controlled and  back in AF/AF .    3. Rhythm Control: Cardioversion, Antiarrhythmics and/or ablation are options for rhythm control. She has had several DCCV last in 4/12/19. Notably, she had possible side effects from Sotalol (however, likely was due to underlying illness at the time and not from medication since she was found to have DBCL).  She is currently in an AFL but is asymptomatic. Will need to follow LVEF, if concern for any tachy-mediated issues then would need to consider treatment. For now, we will not add any further rhythm control measures.   4. Had tachy-shelton syndrome and underwent PPM implant in 12/2019. Normal device function with stable lead parameters. She will have continued follow up with Device clinic per  routine.      Exudative pericardial effusion:  - last noted in cMRI 4/12/18 beginning to organize with diffuse pericardial enhancement. Follow up chest CT showed improved pericardial effusion. Asymptomatic with no shortness of breath or signs or symptoms of constriction.   - had a course of colchicine  - stable on chest CT in 3/2019  - no issues on recent echo    Follow up in 1 year with an echo.    The patient states understanding and is agreeable with plan.   Juan Eaton MD [facility]  Cardiology - Electrophysiology        Please do not hesitate to contact me if you have any questions/concerns.     Sincerely,     Juan Eaton MD

## 2020-10-09 NOTE — PATIENT INSTRUCTIONS
You were seen today in the Adult Congenital and Cardiovascular Genetics Clinic at the [facility].    Cardiology Providers you saw during your visit:  Juan Eaton MD    Diagnosis:  VSD s/p repair    Results:  Juan Eaton MD reviewed the results of your device and echo testing today in clinic.    Recommendations:    1. Continue to eat a heart healthy, low salt diet.  2. Continue to get 20-30 minutes of aerobic activity, 4-5 days per week.  Examples of aerobic activity include walking, running, swimming, cycling, etc.  3. Continue to observe good oral hygiene, with regular dental visits.  4. Stop your digoxin.      SBE prophylaxis:   Yes____  No_X___    Lifelong Bacterial Endocarditis Prophylaxis:  YES____  NO____    If YES is checked, follow the recommendations outlined below:  1. Take antibiotic(s) prior to recommended dental procedures and procedures on the respiratory tract or with infected skin, muscle or bones. SBE prophylaxis is not needed for routine GI and  procedures (ie. Colonoscopy or vaginal delivery)  2. Observe good oral hygiene daily, as advised by your dentist. Get regular professional dental care.  3. Keep cuts clean.  4. Infections should be treated promptly.  5. Symptoms of Infective Endocarditis could include: fever lasting more than 4-5 days or a recurrent fever that initially resolves but returns within 1-2 days)      Exercise restrictions:   Yes__X__  No____         If yes, list restrictions:  Must be allowed to rest if fatigued or SOB      Work restrictions:  Yes____  No_X___         If yes, list restrictions:    FASTING CHOLESTEROL was checked in the last 5 years YES_X__  NO___ (2019)  Continue to eat a heart healthy, low salt diet.         ____ Fasting lipid panel order today         ____ No changes in medications          ____ I recommend the following changes in your cholesterol medications.:          ____ Please follow up for cholesterol screening at your primary care  physician      Follow-up:  Follow up with Dr. Eaton in one year with echo and device prior.    If you have questions or concerns please contact us at:    Kelly Rushing, MSN, RN, CNL    Lesli Glaser (Scheduling)  Nurse Care Coordinator     Clinic   Adult Congenital and CV Genetics   Adult Congenital and CV Genetic  [facility]   [facility]  (P) [phone]     (P) [phone]  [email or web address]   (F) [phone]        For after hours urgent needs, call [phone] and ask to speak to the Adult Congenital Physician on call.  Mention Job Code [id number].    For emergencies call [phone].    [facility]   Clinics and Surgery Center  Mail Code [id number]  [address] [place]  [address]

## 2020-10-09 NOTE — PATIENT INSTRUCTIONS
It was a pleasure to see you in clinic today. Please do not hesitate to call with any questions or concerns. You are scheduled for a remote Pacemaker transmission on 1/20/21. This will happen automatically in the night. We will call in 1-2 business days to discuss the results with you. We look forward to seeing you in clinic at your next device check in 6 months    Ailyn Alvarez, RN  Electrophysiology Nurse Clinician  [facility]  During business hours call:  [phone]  After business hours please call: [phone]- select option #4 and ask for job code [phone].

## 2020-10-12 LAB — INTERPRETATION ECG - MUSE: NORMAL

## 2020-10-14 ENCOUNTER — ANCILLARY PROCEDURE (OUTPATIENT)
Dept: GENERAL RADIOLOGY | Facility: CLINIC | Age: 60
End: 2020-10-14
Attending: FAMILY MEDICINE
Payer: COMMERCIAL

## 2020-10-14 ENCOUNTER — OFFICE VISIT (OUTPATIENT)
Dept: FAMILY MEDICINE | Facility: CLINIC | Age: 60
End: 2020-10-14
Payer: COMMERCIAL

## 2020-10-14 VITALS
BODY MASS INDEX: 28.38 KG/M2 | WEIGHT: 135.8 LBS | HEART RATE: 70 BPM | RESPIRATION RATE: 16 BRPM | DIASTOLIC BLOOD PRESSURE: 83 MMHG | SYSTOLIC BLOOD PRESSURE: 122 MMHG | TEMPERATURE: 97.2 F

## 2020-10-14 DIAGNOSIS — Z01.818 PREOP GENERAL PHYSICAL EXAM: ICD-10-CM

## 2020-10-14 DIAGNOSIS — G72.81 CRITICAL ILLNESS MYOPATHY: ICD-10-CM

## 2020-10-14 DIAGNOSIS — R00.1 BRADYCARDIA: ICD-10-CM

## 2020-10-14 DIAGNOSIS — I48.92 ATRIAL FLUTTER, UNSPECIFIED TYPE (H): ICD-10-CM

## 2020-10-14 DIAGNOSIS — Z13.220 SCREENING FOR HYPERLIPIDEMIA: ICD-10-CM

## 2020-10-14 DIAGNOSIS — Z12.31 ENCOUNTER FOR SCREENING MAMMOGRAM FOR BREAST CANCER: ICD-10-CM

## 2020-10-14 DIAGNOSIS — C83.30 DIFFUSE LARGE B-CELL LYMPHOMA, UNSPECIFIED BODY REGION (H): Chronic | ICD-10-CM

## 2020-10-14 DIAGNOSIS — R73.09 ELEVATED GLUCOSE: ICD-10-CM

## 2020-10-14 DIAGNOSIS — Z23 NEED FOR PROPHYLACTIC VACCINATION AND INOCULATION AGAINST INFLUENZA: ICD-10-CM

## 2020-10-14 DIAGNOSIS — D69.6 THROMBOCYTOPENIA, UNSPECIFIED (H): ICD-10-CM

## 2020-10-14 DIAGNOSIS — I74.9 EMBOLISM AND THROMBOSIS OF UNSPECIFIED ARTERY (H): Chronic | ICD-10-CM

## 2020-10-14 DIAGNOSIS — R53.81 PHYSICAL DECONDITIONING: ICD-10-CM

## 2020-10-14 DIAGNOSIS — Z12.31 VISIT FOR SCREENING MAMMOGRAM: ICD-10-CM

## 2020-10-14 DIAGNOSIS — Z86.74 H/O CARDIAC ARREST: ICD-10-CM

## 2020-10-14 DIAGNOSIS — M06.9 RHEUMATOID ARTHRITIS OF BOTH ANKLES, UNSPECIFIED WHETHER RHEUMATOID FACTOR PRESENT (H): Primary | Chronic | ICD-10-CM

## 2020-10-14 DIAGNOSIS — M06.9 RHEUMATOID ARTHRITIS OF BOTH ANKLES, UNSPECIFIED WHETHER RHEUMATOID FACTOR PRESENT (H): Chronic | ICD-10-CM

## 2020-10-14 DIAGNOSIS — I10 ESSENTIAL HYPERTENSION: Chronic | ICD-10-CM

## 2020-10-14 DIAGNOSIS — I89.0 LYMPHEDEMA OF BOTH LOWER EXTREMITIES: ICD-10-CM

## 2020-10-14 LAB
ANION GAP SERPL CALCULATED.3IONS-SCNC: 1 MMOL/L (ref 3–14)
BUN SERPL-MCNC: 20 MG/DL (ref 7–30)
CALCIUM SERPL-MCNC: 9.7 MG/DL (ref 8.5–10.1)
CHLORIDE SERPL-SCNC: 104 MMOL/L (ref 94–109)
CHOLEST SERPL-MCNC: 129 MG/DL
CO2 SERPL-SCNC: 33 MMOL/L (ref 20–32)
CREAT SERPL-MCNC: 0.99 MG/DL (ref 0.52–1.04)
ERYTHROCYTE [DISTWIDTH] IN BLOOD BY AUTOMATED COUNT: 13 % (ref 10–15)
GFR SERPL CREATININE-BSD FRML MDRD: 62 ML/MIN/{1.73_M2}
GLUCOSE SERPL-MCNC: 101 MG/DL (ref 70–99)
HCT VFR BLD AUTO: 46.4 % (ref 35–47)
HDLC SERPL-MCNC: 48 MG/DL
HGB BLD-MCNC: 15.5 G/DL (ref 11.7–15.7)
LDLC SERPL CALC-MCNC: 54 MG/DL
MCH RBC QN AUTO: 36 PG (ref 26.5–33)
MCHC RBC AUTO-ENTMCNC: 33.4 G/DL (ref 31.5–36.5)
MCV RBC AUTO: 108 FL (ref 78–100)
NONHDLC SERPL-MCNC: 81 MG/DL
PLATELET # BLD AUTO: 154 10E9/L (ref 150–450)
POTASSIUM SERPL-SCNC: 3.7 MMOL/L (ref 3.4–5.3)
RBC # BLD AUTO: 4.3 10E12/L (ref 3.8–5.2)
SODIUM SERPL-SCNC: 138 MMOL/L (ref 133–144)
TRIGL SERPL-MCNC: 133 MG/DL
WBC # BLD AUTO: 5.5 10E9/L (ref 4–11)

## 2020-10-14 PROCEDURE — 72040 X-RAY EXAM NECK SPINE 2-3 VW: CPT | Mod: FY | Performed by: RADIOLOGY

## 2020-10-14 PROCEDURE — 90682 RIV4 VACC RECOMBINANT DNA IM: CPT | Performed by: FAMILY MEDICINE

## 2020-10-14 PROCEDURE — 99214 OFFICE O/P EST MOD 30 MIN: CPT | Mod: 25 | Performed by: FAMILY MEDICINE

## 2020-10-14 PROCEDURE — 80061 LIPID PANEL: CPT | Performed by: FAMILY MEDICINE

## 2020-10-14 PROCEDURE — 80048 BASIC METABOLIC PNL TOTAL CA: CPT | Performed by: FAMILY MEDICINE

## 2020-10-14 PROCEDURE — 85027 COMPLETE CBC AUTOMATED: CPT | Performed by: FAMILY MEDICINE

## 2020-10-14 PROCEDURE — 36415 COLL VENOUS BLD VENIPUNCTURE: CPT | Performed by: FAMILY MEDICINE

## 2020-10-14 PROCEDURE — 90471 IMMUNIZATION ADMIN: CPT | Performed by: FAMILY MEDICINE

## 2020-10-14 ASSESSMENT — PAIN SCALES - GENERAL: PAINLEVEL: MODERATE PAIN (4)

## 2020-10-14 NOTE — PROGRESS NOTES
[facility]  [address] [address]  Phone: [phone]  Primary Provider: Gala Stringer  Pre-op Performing Provider: TRINO HEALY    PREOPERATIVE EVALUATION:  Today's date: 10/14/2020    Amelia Michel is a 59 year old female who presents for a preoperative evaluation.    Surgical Information:  Surgery/Procedure: Right Ankle Fusion  Surgery Location: [facility]  Surgeon: Dr. Qamar Murillo  Surgery Date: 10/20/2020  Time of Surgery: TBD  Where patient plans to recover: At home with family  Fax number for surgical facility:     Type of Anesthesia Anticipated: to be determined    Subjective     HPI related to upcoming procedure: having reduction and repair of contraction of right ankle    Preop Questions 10/14/2020   1. Have you ever had a heart attack or stroke? No   2. Have you ever had surgery on your heart or blood vessels, such as a stent placement, a coronary artery bypass, or surgery on an artery in your head, neck, heart, or legs? YES -    3. Do you have chest pain with activity? No   4. Do you have a history of  heart failure? YES -    5. Do you currently have a cold, bronchitis or symptoms of other infection? No   6. Do you have a cough, shortness of breath, or wheezing? No   7. Do you or anyone in your family have previous history of blood clots? YES -   8. Do you or does anyone in your family have a serious bleeding problem such as prolonged bleeding following surgeries or cuts? No   9. Have you ever had problems with anemia or been told to take iron pills? YES -   10. Have you had any abnormal blood loss such as black, tarry or bloody stools, or abnormal vaginal bleeding? No   11. Have you ever had a blood transfusion? YES -    11a. Have you ever had a transfusion reaction? YES -    12. Are you willing to have a blood transfusion if it is medically needed before, during, or after your surgery? Yes   13. Have you or any of your relatives ever had problems with  anesthesia? No   14. Do you have sleep apnea, excessive snoring or daytime drowsiness? No   15. Do you have any artifical heart valves or other implanted medical devices like a pacemaker, defibrillator, or continuous glucose monitor? YES    15a. What type of device do you have? pacemaker   15b. Name of the clinic that manages your device:  [facility]   16. Do you have artificial joints? No   17. Are you allergic to latex? No   18. Is there any chance that you may be pregnant? No     Recent cardiology note.  10/9/20    10/9/20 ECHOCARDIOGRAM:  Interpretation Summary  VSD s/p repair in 1969  Global and regional left ventricular function is normal with an EF of 60-65%.  No flow acceleration is seen across the septum.  Global right ventricular function is normal. Mild right ventricular dilation  is present.  There is moderate tricuspid regurgitation.  The estimated PA systolic pressure is 32 mmHg but this is likely to be  underestimated due to the presence of multiple jets.  This study was compared with the study from 09/11/2017. There has been no  change in the severity of the tricuspid regurgitation or the RV size/function.     Assessment and Plan:   Ms. Michel is a 59 year old female who has a past medical history significant for VSD s/p repair 1969, RA, HTN, insomnia, atrial tachyarrhythmias, cardiac arrest, SSS s/p PPM 12/2019,  DLBCL, and exudative pericardial effusion. She presents today for follow up. She reports feeling well. She denies any chest pain/pressures, dizziness, lightheadedness, worsening shortness of breath, leg/ankle swelling, PND, orthopnea, palpitations, or syncopal symptoms. Device interrogation shows normal pacemaker function. 2 Fast A&V and 1 NSVT episodes recorded since last remote transmission. Intrinsic rhythm = Atrial Flutter, w/ VS @ 88 bpm. AF burden = 85.9%. Pt is on Eliquis. AP = 16.7%.  = 23.6%.  Estimated battery longevity to OZZY = 13.6 years. No short v-v intervals recorded. Lead  trends appear stable. Presenting 12 lead ECG shows AF/AFL and  Vent Rate 69 bpm,  ms, QTc 473 ms. Echo shows LVEF 60-65%, mildly dilated RV with normal function, moderate TR, unchanged from prior echo. Current cardiac medications include: Elqiuis, Atenolol, Digoxin, Lasix, and Spironolactone.      Sick Sinus Syndrome:   Atrial Fibrillation:  1. Stroke Prophylaxis:  CHADSVASC=+HTN, +gender  2, corresponding to a 2.2% annual stroke / systemic emolism event rate. indicating need for long term oral anticoagulation.  She is on Eliquis. No bleeding issues.   2. Rate Control: Continue Atenolol. Stop digoxin today as rate controlled and  back in AF/AF .    3. Rhythm Control: Cardioversion, Antiarrhythmics and/or ablation are options for rhythm control. She has had several DCCV last in 4/12/19. Notably, she had possible side effects from Sotalol (however, likely was due to underlying illness at the time and not from medication since she was found to have DBCL).  She is currently in an AFL but is asymptomatic. Will need to follow LVEF, if concern for any tachy-mediated issues then would need to consider treatment. For now, we will not add any further rhythm control measures.   4. Had tachy-shelton syndrome and underwent PPM implant in 12/2019. Normal device function with stable lead parameters. She will have continued follow up with Device clinic per routine.      Exudative pericardial effusion:  - last noted in cMRI 4/12/18 beginning to organize with diffuse pericardial enhancement. Follow up chest CT showed improved pericardial effusion. Asymptomatic with no shortness of breath or signs or symptoms of constriction.   - had a course of colchicine  - stable on chest CT in 3/2019  - no issues on recent echo     Recent oncology note 9/17/20  A/P: 60 yo woman with history of rhematoid arthritis and history of stage 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 Lymphoma: Got cycle #1 in the  hospital and I chose R-EPOCH for infusional nature due to possible intracardiac involvement and also extensive disease to allow for slower lymphoma kill. Tolerated this well, aside from significant cytopenias (which were present at diagnosis). Then transitioned to R-CHOP to finish out a total of 6 cycles of chemotherapy (1 R-EPOCH and 5 R-CHOP) with initially planned for High Dose MTX on Day 15 of cycle 2,4, and 6 due to high IPI but then transition to prophy IT chemo on cycle 4 and 6 due to toxicity with the high dose methotrexate.   -- PET CT post 3 cycles CR1 and repeat PET CT post therapy completion in 1/2018 showed CR1.   - Final Surveillance Scans in 9/2019 showed ongoing remission     No further scans unless symptoms develop. No sign of disease recurrence        2. ID:  Not on any abx at this point   - Shingrix vaccine booster 9/2019     3. Rheumatoid arthritis: is on prednisone 5 mg daily and plaquenil. Stable. She is interested in trying to taper her prednisone but wants to wait until after her right ankle surgery gets finished     4. CV: A. Fib. Post pacemaker placement.  - post pacemaker  -- Pericardial effusion noted during hospitalization in 3/2018 and pericarditis on 3/8/2018 MRI. Repeat MRI 4/12/2018 showed ongoing pericardial effusion with the start of organization felt to be ongoing inflammation. CT Chest today shows no recurrence of this    Recent RHEUMATOLOGY NOTE 9/11/20    Impression/Plan:  1. Seropositive rheumatoid arthritis (, RF 31) with multiple joint disability including MTP fusion 1-5 bilaterally, partial right wrist fusion, subluxation and ulnar deformity of MCP's. Rheumatoid arthritis (RA) not controlled.  Will continue prednisone, and hydroxychloroquine (plaquenil) 200 mg once a day. Prn joint injection or a short burst of prednisone for symptom relief (per her preference). Discussion of options with her leflunomide (arava) --had  Liver involvment with #3, sulfasalazine  re-trial, adalimumab (humira) but higher risk of cancer. She will discuss with oncology and think about this. She is not interested in using an alternative (leflunomide or rituximab) for RA.  Advise cervical x-rays prior to any surgeries, does have DDD (no symptoms at this time). Poor prognosis, discussed risk extraarticular and deformities risks and of AA instability, she understands.  2. Long-term hydroxychloroquine (plaquenil) use since 6-2017, OCT  no toxicity, reviewed AAO guidelines, repeat every year. 200 mg once a day   3. Diffuse large B-cell lymphoma status-post 1 cycle R-EPOCH, 6 cycles R-CHOP; per oncology for surveillance   4. Osteoporosis, chronic long-term corticosteroid use with cushingoid appearance. Received 6-7 years of alendronate in early 2000s. Restarted alendronate in 1/2019. DEXA  T-2.6 thoracic. Continue vit D-Ca supplementation, walker for fall risk prevention but should discuss with PCP about this as if > 5 yr higher risk atypical infections  5. 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 Low back pain, no radiculopathy.  If any redflags, 911/ER. If not improved or new symptoms, would advise follow-up with PCP and do imaging (risk of compression fx with osteoporosis )  8. Loculated pericardial effusion, etiology unclear (lymphoma). No symptoms now. Per oncology        Today's 9/11/2020 plan:     No change inn RA meds (stable RA)     Resume fosamax 70 mg a week     Labs and DEXA on 9/17/202.diag    Health Care Directive:  Patient does not have a Health Care Directive or Living Will: Advance Directive received and scanned. Click on Code in the patient header to view.    Preoperative Review of :   reviewed - no record of controlled substances prescribed.      Status of Chronic Conditions:  See problem list for active medical problems.  Problems all longstanding and stable, except as noted/documented.  See ROS  for pertinent symptoms related to these conditions.      Review of Systems  CONSTITUTIONAL: NEGATIVE for fever, chills, change in weight  ENT/MOUTH: NEGATIVE for ear, mouth and throat problems  RESP: NEGATIVE for significant cough or SOB  CV: NEGATIVE for chest pain, palpitations or peripheral edema    Patient Active Problem List    Diagnosis Date Noted     Heart palpitations 12/12/2019     Priority: Medium     Bradycardia 12/12/2019     Priority: Medium     Added automatically from request for surgery [id number]       Mild protein-calorie malnutrition (H) 10/29/2019     Priority: Medium     Embolism and thrombosis of unspecified artery (H) 10/29/2019     Priority: Medium     Thrombocytopenia, unspecified (H) 10/29/2019     Priority: Medium     Cervical cancer screening 10/18/2018     Priority: Medium     10/18/2018: Pap screening intervals discussed with oncologist, every 3 years should be fine.   Magda Stringer PA-C        Atrial flutter, unspecified type (H) 05/17/2018     Priority: Medium     Paroxysmal atrial fibrillation (H) 05/11/2018     Priority: Medium     H/O cardiac arrest 09/26/2017     Priority: Medium     DLBCL (diffuse large B cell lymphoma) (H) 09/07/2017     Priority: Medium     Physical deconditioning 08/12/2017     Priority: Medium     Febrile neutropenia (H) 08/08/2017     Priority: Medium     Diffuse large B cell lymphoma (H) 08/04/2017     Priority: Medium     Diffuse large B-cell lymphoma of extranodal site (H) 07/27/2017     Priority: Medium     Critical illness myopathy 06/16/2017     Priority: Medium     Cardiogenic shock (H) 05/29/2017     Priority: Medium     Atrial tachycardia (H) 05/16/2017     Priority: Medium     Lymphedema of both lower extremities 04/04/2017     Priority: Medium     RA (rheumatoid arthritis) (H) 05/02/2016     Priority: Medium     Hyperlipidemia LDL goal <130 02/22/2011     Priority: Medium     HTN (hypertension)      Priority: Medium     Primary pulmonary  hypertension (H)      Priority: Medium     Seeing [facility].         Past Medical History:   Diagnosis Date     Arthritis      Cardiac arrest (H)      History of chemotherapy      Hypertension      Neuropathy      Past Surgical History:   Procedure Laterality Date     ANESTHESIA CARDIOVERSION N/A 5/17/2017    Procedure: ANESTHESIA CARDIOVERSION;  ANESTHESIA CARDIOVERSION;  Surgeon: GENERIC ANESTHESIA PROVIDER;  Location: [facility]     ANESTHESIA CARDIOVERSION  3/12/2018    Procedure: ANESTHESIA CARDIOVERSION;;  Surgeon: GENERIC ANESTHESIA PROVIDER;  Location: [facility]     ANESTHESIA CARDIOVERSION N/A 3/23/2018    Procedure: ANESTHESIA CARDIOVERSION;  Anesthesia Cardioversion ;  Surgeon: GENERIC ANESTHESIA PROVIDER;  Location: [facility]     ANESTHESIA CARDIOVERSION N/A 4/12/2018    Procedure: ANESTHESIA CARDIOVERSION;  Cardioversion;  Surgeon: GENERIC ANESTHESIA PROVIDER;  Location:  OR     ARTHRODESIS WRIST  2000    Right wrist     BONE MARROW ASPIRATE &BIOPSY  7/12/2017          COLONOSCOPY N/A 11/19/2019    Procedure: Colonoscopy, With Polypectomy And Biopsy;  Surgeon: Pj Vasques MD;  Location: [facility]     EP PACEMAKER N/A 12/13/2019    Procedure: EP Pacemaker;  Surgeon: Pj Trent MD;  Location:  [facility]     EXCISE LESION UPPER EXTREMITY Left 5/22/2018    Procedure: EXCISE LESION UPPER EXTREMITY;  Left Arm Wound Excision And Closure, Possible Submuscular Transposition of Ulnar Nerve  (Choice Anesthesia);  Surgeon: Kevin Sheldon MD;  Location:  OR     FOOT SURGERY      4 left and 2 right     RELEASE CARPAL TUNNEL BILATERAL       REPAIR VENTRICULAR SEPTAL DEFECT  1969     TRANSPOSITION ULNAR NERVE (ELBOW) Left 5/22/2018    Procedure: TRANSPOSITION ULNAR NERVE (ELBOW);;  Surgeon: Kevin Sheldon MD;  Location:  OR     Current Outpatient Medications   Medication Sig Dispense Refill     acetaminophen (TYLENOL) 500 MG tablet Take 500 mg by mouth every 6 hours as needed for mild pain        alendronate (FOSAMAX) 70 MG tablet Take 1 tablet (70 mg) by mouth every 7 days On Monday 12 tablet 1     apixaban ANTICOAGULANT (ELIQUIS ANTICOAGULANT) 5 MG tablet Take 1 tablet (5 mg) by mouth 2 times daily 180 tablet 3     atenolol (TENORMIN) 25 MG tablet TAKE 1 TABLET BY MOUTH TWICE DAILY 180 tablet 3     calcium carbonate 600 mg-vitamin D 400 units (CALTRATE) 600-400 MG-UNIT per tablet Take 2 tablets by mouth daily       furosemide (LASIX) 20 MG tablet Take 1 tablet (20 mg) by mouth daily 90 tablet 3     gabapentin (NEURONTIN) 100 MG capsule Take 2 capsules (200 mg) by mouth 3 times daily 540 capsule 3     hydroxychloroquine (PLAQUENIL) 200 MG tablet Take 1 tablet (200 mg) by mouth daily 90 tablet 3     magnesium 250 MG tablet Take 1 tablet by mouth At Bedtime       multivitamin, therapeutic with minerals (THERA-VIT-M) TABS tablet Take 1 tablet by mouth daily 30 each 0     predniSONE (DELTASONE) 5 MG tablet Take 1 tablet (5 mg) by mouth daily 90 tablet 3     spironolactone (ALDACTONE) 25 MG tablet Take 25 mg by mouth daily       STATIN NOT PRESCRIBED (INTENTIONAL) Please choose reason not prescribed, below (Patient not taking: Reported on 10/9/2020)       tolterodine ER (DETROL LA) 4 MG 24 hr capsule Take 1 capsule (4 mg) by mouth daily 90 capsule 3       Allergies   Allergen Reactions     Amiodarone Other (See Comments) and Difficulty breathing     Lethargic and had trouble breathing- occurred in 2017     Blood Transfusion Related (Informational Only) Hives     Hives with platelets. Give benadryl premedication.     Metoprolol Other (See Comments)     Pt and  report that metoprolol does not work for her and also reports feeling unwell with this medication. She has been able to tolerate atenolol, which as worked in controlling her HR.      No Clinical Screening - See Comments      Penicillin Allergy Skin Test not performed, see antimicrobial management team progress note 7/5/17.       Penicillins      Tape  [Adhesive Tape] Rash        Social History     Tobacco Use     Smoking status: Never Smoker     Smokeless tobacco: Never Used   Substance Use Topics     Alcohol use: Yes     Comment: none     Family History   Problem Relation Age of Onset     Breast Cancer Mother      Hypertension Mother      Alzheimer Disease Mother      Cerebrovascular Disease Mother      Hypertension Father      Cerebrovascular Disease Father      Atrial fibrillation Father      Lymphoma Father      Prostate Cancer Father      Diabetes Paternal Grandmother      Alzheimer Disease Maternal Grandmother         likely     Arthritis Maternal Grandfather      Pneumonia Maternal Grandfather      History   Drug Use No         Objective     There were no vitals taken for this visit.    Physical Exam  GENERAL APPEARANCE: healthy, alert and no distress  HENT: ear canals and TM's normal and nose and mouth without ulcers or lesions  RESP: lungs clear to auscultation - no rales, rhonchi or wheezes  CV: regular rate and rhythm, normal S1 S2, no S3 or S4 and no murmur, click or rub   ABDOMEN: soft, nontender, no HSM or masses and bowel sounds normal  NEURO: Normal strength and tone, sensory exam grossly normal, mentation intact and speech normal    Recent Labs   Lab Test 09/17/20  1350 03/20/20  1611 10/24/19  1534 10/24/19  1534   HGB 14.2 15.3   < >  --     154   < >  --     135   < >  --    POTASSIUM 3.9 4.3   < >  --    CR 0.88 0.93   < >  --    A1C  --   --   --  5.6    < > = values in this interval not displayed.      Diagnostics:  No labs were ordered during this visit.   No EKG required for low risk surgery (cataract, skin procedure, breast biopsy, etc).    Revised Cardiac Risk Index (RCRI):  The patient has the following serious cardiovascular risks for perioperative complications:   - No serious cardiac risks = 0 points     RCRI Interpretation: 1 point: Class II (low risk - 0.9% complication rate)           Assessment & Plan   The proposed  surgical procedure is considered INTERMEDIATE risk.    1. Rheumatoid arthritis of both ankles, unspecified whether rheumatoid factor present (H)  - XR Cervical Spine 2/3 Views; Future    On daily prednisone 5mg.  Does not meet requirement for prophylactic steroid burst.  But monitor closely in post op period for signs of adrenal insufficieny    2. Atrial flutter, unspecified type (H)  Bradycardia has pacer in   Sick Sinus Syndrome:   Atrial Fibrillation:  1. Stroke Prophylaxis:  CHADSVASC=+HTN, +gender  2, corresponding to a 2.2% annual stroke / systemic emolism event rate. indicating need for long term oral anticoagulation.  She is on Eliquis. No bleeding issues.   2. Rate Control: Continue Atenolol. Stop digoxin today as rate controlled and  back in AF/AF .    3. Rhythm Control: Cardioversion, Antiarrhythmics and/or ablation are options for rhythm control. She has had several DCCV last in 4/12/19. Notably, she had possible side effects from Sotalol (however, likely was due to underlying illness at the time and not from medication since she was found to have DBCL).  She is currently in an AFL but is asymptomatic. Will need to follow LVEF, if concern for any tachy-mediated issues then would need to consider treatment. For now, we will not add any further rhythm control measures.   4. Had tachy-shelton syndrome and underwent PPM implant in 12/2019. Normal device function with stable lead parameters. She will have continued follow up with Device clinic per routine.     4. Essential hypertension  Good control.  Continue currant medications, continue to monito     5. Lymphedema of both lower extremities  Good control.  Continue currant medications, continue to monito         7. Diffuse large B-cell lymphoma, unspecified body region (H)  Recent oncology note 9/17/20  A/P: 60 yo woman with history of rhematoid arthritis and history of stage 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 Lymphoma:  Got cycle #1 in the hospital and I chose R-EPOCH for infusional nature due to possible intracardiac involvement and also extensive disease to allow for slower lymphoma kill. Tolerated this well, aside from significant cytopenias (which were present at diagnosis). Then transitioned to R-CHOP to finish out a total of 6 cycles of chemotherapy (1 R-EPOCH and 5 R-CHOP) with initially planned for High Dose MTX on Day 15 of cycle 2,4, and 6 due to high IPI but then transition to prophy IT chemo on cycle 4 and 6 due to toxicity with the high dose methotrexate.   -- PET CT post 3 cycles CR1 and repeat PET CT post therapy completion in 1/2018 showed CR1.   - Final Surveillance Scans in 9/2019 showed ongoing remission     No further scans unless symptoms develop. No sign of disease recurrence        2. ID:  Not on any abx at this point   - Shingrix vaccine booster 9/2019     3. Rheumatoid arthritis: is on prednisone 5 mg daily and plaquenil. Stable. She is interested in trying to taper her prednisone but wants to wait until after her right ankle surgery gets finished     4. CV: A. Fib. Post pacemaker placement.  - post pacemaker  -- Pericardial effusion noted during hospitalization in 3/2018 and pericarditis on 3/8/2018 MRI. Repeat MRI 4/12/2018 showed ongoing pericardial effusion with the start of organization felt to be ongoing inflammation. CT Chest today shows no recurrence of this      8. Thrombocytopenia, unspecified (H)   Rechecked today    9. H/O cardiac arrest      11. Embolism and thrombosis of unspecified artery (H)  Will hold eliquis for 2 days prior to procedure        Implanted Device:   - Type of device: see note  Patient advised to bring device information on day of surgery.      Risks and Recommendations:  The patient has the following additional risks and recommendations for perioperative complications:   - Recurrent use of steroids  Cardiovascular:   - Cardiology consulted continue atenolol in perioperative  period.   Anemia/Bleeding/Clotting:    - History of DVT or PE, consider DVT prevention postoperatively    Medication Instructions:   - apixaban (Eliquis), edoxaban (Savaysa), rivaroxaban (Xarelto): Bleeding risk is moderate or high for this procedure AND CrCl  (>=) 50 mL/min. HOLD 2 days before surgery.     RECOMMENDATION:  APPROVAL GIVEN to proceed with proposed procedure, without further diagnostic evaluation.    Signed Electronically by: Jesus Nowak MD    Copy of this evaluation report is provided to requesting physician.    [facility] Preop Guidelines    Revised Cardiac Risk Index

## 2020-10-14 NOTE — PATIENT INSTRUCTIONS
"  Preparing for Your Surgery    Hold eliquis for 2 days prior to surgery    Getting started  A surgery nurse will call you to review your health history and instructions. They will give you an arrival time based on your scheduled surgery time.  Please be ready to share the following:    Your doctor's clinic name and phone number    Your medical, surgical and anesthesia history    A list of allergies and sensitivities    A list of medicines, including herbal treatments and over-the-counter drugs    Whether the patient has a legal guardian (ask how to send us the papers in advance)  If your child is having surgery, please ask for a copy of Preparing for Your Child's Surgery.    Preparing for surgery    Within 30 days of surgery: Have an exam at your family clinic (primary care clinic), or go to a pre-operative clinic. This exam is called a \"History and Physical,\" or H&P.    At your H&P exam, talk to your care team about all medicines you take. If you need to stop any medicines before surgery, ask when to start taking them again.  ? We do this for your safety. Many medicines can make you bleed too much during surgery. Some change how well surgery (anesthesia) drugs work.    Call your insurance company to see what it will and won't pay for. Ask if they need to pre-approve the surgery. (If no insurance, call [phone].)    Call your surgeon's clinic if there's any change in your health. This includes signs of a cold or flu (sore throat, runny nose, cough, rash, fever). It also includes a scrape or scratch near the surgery site.    If you have questions on the day of surgery, call your surgery center.  Eating and drinking guidelines  For your safety: Unless your surgeon tells you otherwise, follow the guidelines below.    Eat and drink as usual until 8 hours before surgery. After that, no food or milk.    Drink clear liquids until 2 hours before surgery. These are liquids you can see through, like water, Gatorade and " Propel Water. You may also have black coffee and tea (no cream or milk).    Nothing by mouth within 2 hours of surgery. This includes gum, candy and breath mints.    Stop alcohol the midnight before surgery.    If your family clinic tells you to take medicine on the morning of surgery, it's okay to take it with a sip of water.  Preventing infection    Shower or bathe the night before and morning of your surgery. Follow the instructions your clinic gave you. (If no instructions, use regular soap.)    Don't shave or clip hair near your surgery site. This can lead to skin infection.    Don't smoke the morning of surgery. Smoking increases the risk of infection. You may chew nicotine gum up to 2 hours before surgery. A nicotine patch is okay.  ? Note: Some surgeries require you to completely quit smoking and nicotine. Check with your surgeon.    Your care team will make every effort to keep you safe from infection. We will:  ? Clean our hands often with soap and water (or an alcohol-based hand rub).  ? Clean the skin at your surgery site with a special soap that kills germs. We'll also remove hair from the site as needed.  ? Wear special hair covers, masks, gowns and gloves during surgery.  ? Give antibiotic medicine, if prescribed. Not all surgeries need antibiotics.  What to bring on the day of surgery    Photo ID and insurance card    Copy of your health care directive, if you have one    Glasses and hearing aides (bring cases)  ? You can't wear contacts during surgery    Inhaler and eye drops, if you use them (tell us about these when you arrive)    CPAP machine or breathing device, if you use them    A few personal items, if spending the night    If you have . . .  ? A pacemaker or ICD (cardiac defibrillator): Bring the ID card.  ? An implanted stimulator: Bring the remote control.  ? A legal guardian: Bring a copy of the certified (court-stamped) guardianship papers.  Please remove any jewelry, including body  piercings. Leave jewelry and other valuables at home.  If you're going home the day of surgery  Important: If you don't follow the rules below, we must cancel your surgery.     Arrange for someone to drive you home after surgery. You may not drive, take a taxi or take public transportation by yourself (unless you'll have local anesthesia only).    Arrange for a responsible adult to stay with you overnight. If you don't, we may keep you in the hospital overnight, and you may need to pay the costs yourself.  Questions?   If you have any questions for your care team, list them here: _________________________________________________________________________________________________________________________________________________________________________________________________________________________________________________________________________________________________________________________  For informational purposes only. Not to replace the advice of your health care provider. Copyright   2067-2716 [facility]. All rights reserved. Clinically reviewed by Marisol Pickett MD. SMARTworks [id number] - REV 07/19.

## 2020-10-14 NOTE — PROGRESS NOTES
Recent cardiology note.  10/9/20    10/9/20 ECHOCARDIOGRAM:  Interpretation Summary  VSD s/p repair in 1969  Global and regional left ventricular function is normal with an EF of 60-65%.  No flow acceleration is seen across the septum.  Global right ventricular function is normal. Mild right ventricular dilation  is present.  There is moderate tricuspid regurgitation.  The estimated PA systolic pressure is 32 mmHg but this is likely to be  underestimated due to the presence of multiple jets.  This study was compared with the study from 09/11/2017. There has been no  change in the severity of the tricuspid regurgitation or the RV size/function.     Assessment and Plan:   Ms. Michel is a 59 year old female who has a past medical history significant for VSD s/p repair 1969, RA, HTN, insomnia, atrial tachyarrhythmias, cardiac arrest, SSS s/p PPM 12/2019,  DLBCL, and exudative pericardial effusion. She presents today for follow up. She reports feeling well. She denies any chest pain/pressures, dizziness, lightheadedness, worsening shortness of breath, leg/ankle swelling, PND, orthopnea, palpitations, or syncopal symptoms. Device interrogation shows normal pacemaker function. 2 Fast A&V and 1 NSVT episodes recorded since last remote transmission. Intrinsic rhythm = Atrial Flutter, w/ VS @ 88 bpm. AF burden = 85.9%. Pt is on Eliquis. AP = 16.7%.  = 23.6%.  Estimated battery longevity to OZZY = 13.6 years. No short v-v intervals recorded. Lead trends appear stable. Presenting 12 lead ECG shows AF/AFL and  Vent Rate 69 bpm,  ms, QTc 473 ms. Echo shows LVEF 60-65%, mildly dilated RV with normal function, moderate TR, unchanged from prior echo. Current cardiac medications include: Elqiuis, Atenolol, Digoxin, Lasix, and Spironolactone.      Sick Sinus Syndrome:   Atrial Fibrillation:  1. Stroke Prophylaxis:  CHADSVASC=+HTN, +gender  2, corresponding to a 2.2% annual stroke / systemic emolism event rate. indicating need  for long term oral anticoagulation.  She is on Eliquis. No bleeding issues.   2. Rate Control: Continue Atenolol. Stop digoxin today as rate controlled and  back in AF/AF .    3. Rhythm Control: Cardioversion, Antiarrhythmics and/or ablation are options for rhythm control. She has had several DCCV last in 4/12/19. Notably, she had possible side effects from Sotalol (however, likely was due to underlying illness at the time and not from medication since she was found to have DBCL).  She is currently in an AFL but is asymptomatic. Will need to follow LVEF, if concern for any tachy-mediated issues then would need to consider treatment. For now, we will not add any further rhythm control measures.   4. Had tachy-shelton syndrome and underwent PPM implant in 12/2019. Normal device function with stable lead parameters. She will have continued follow up with Device clinic per routine.      Exudative pericardial effusion:  - last noted in cMRI 4/12/18 beginning to organize with diffuse pericardial enhancement. Follow up chest CT showed improved pericardial effusion. Asymptomatic with no shortness of breath or signs or symptoms of constriction.   - had a course of colchicine  - stable on chest CT in 3/2019  - no issues on recent echo     Recent oncology note 9/17/20  A/P: 60 yo woman with history of rhematoid arthritis and history of stage 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 Lymphoma: Got cycle #1 in the hospital and I chose R-EPOCH for infusional nature due to possible intracardiac involvement and also extensive disease to allow for slower lymphoma kill. Tolerated this well, aside from significant cytopenias (which were present at diagnosis). Then transitioned to R-CHOP to finish out a total of 6 cycles of chemotherapy (1 R-EPOCH and 5 R-CHOP) with initially planned for High Dose MTX on Day 15 of cycle 2,4, and 6 due to high IPI but then transition to prophy IT chemo on cycle 4 and 6 due to  toxicity with the high dose methotrexate.   -- PET CT post 3 cycles CR1 and repeat PET CT post therapy completion in 1/2018 showed CR1.   - Final Surveillance Scans in 9/2019 showed ongoing remission     No further scans unless symptoms develop. No sign of disease recurrence        2. ID:  Not on any abx at this point   - Shingrix vaccine booster 9/2019     3. Rheumatoid arthritis: is on prednisone 5 mg daily and plaquenil. Stable. She is interested in trying to taper her prednisone but wants to wait until after her right ankle surgery gets finished     4. CV: A. Fib. Post pacemaker placement.  - post pacemaker  -- Pericardial effusion noted during hospitalization in 3/2018 and pericarditis on 3/8/2018 MRI. Repeat MRI 4/12/2018 showed ongoing pericardial effusion with the start of organization felt to be ongoing inflammation. CT Chest today shows no recurrence of this    Recent RHEUMATOLOGY NOTE 9/11/20    Impression/Plan:  1. Seropositive rheumatoid arthritis (, RF 31) with multiple joint disability including MTP fusion 1-5 bilaterally, partial right wrist fusion, subluxation and ulnar deformity of MCP's. Rheumatoid arthritis (RA) not controlled.  Will continue prednisone, and hydroxychloroquine (plaquenil) 200 mg once a day. Prn joint injection or a short burst of prednisone for symptom relief (per her preference). Discussion of options with her leflunomide (arava) --had  Liver involvment with #3, sulfasalazine re-trial, adalimumab (humira) but higher risk of cancer. She will discuss with oncology and think about this. She is not interested in using an alternative (leflunomide or rituximab) for RA.  Advise cervical x-rays prior to any surgeries, does have DDD (no symptoms at this time). Poor prognosis, discussed risk extraarticular and deformities risks and of AA instability, she understands.  2. Long-term hydroxychloroquine (plaquenil) use since 6-2017, OCT  no toxicity, reviewed AAO guidelines,  repeat every year. 200 mg once a day   3. Diffuse large B-cell lymphoma status-post 1 cycle R-EPOCH, 6 cycles R-CHOP; per oncology for surveillance   4. Osteoporosis, chronic long-term corticosteroid use with cushingoid appearance. Received 6-7 years of alendronate in early 2000s. Restarted alendronate in 1/2019. DEXA  T-2.6 thoracic. Continue vit D-Ca supplementation, walker for fall risk prevention but should discuss with PCP about this as if > 5 yr higher risk atypical infections  5. 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 Low back pain, no radiculopathy.  If any redflags, 911/ER. If not improved or new symptoms, would advise follow-up with PCP and do imaging (risk of compression fx with osteoporosis )  8. Loculated pericardial effusion, etiology unclear (lymphoma). No symptoms now. Per oncology        Today's 9/11/2020 plan:     No change inn RA meds (stable RA)     Resume fosamax 70 mg a week     Labs and DEXA on 9/17/202.diag

## 2020-10-15 ASSESSMENT — MIFFLIN-ST. JEOR: SCORE: 1072.11

## 2020-10-16 ENCOUNTER — COMMUNICATION - HEALTHEAST (OUTPATIENT)
Dept: TELEHEALTH | Facility: CLINIC | Age: 60
End: 2020-10-16

## 2020-10-16 ENCOUNTER — AMBULATORY - HEALTHEAST (OUTPATIENT)
Dept: LAB | Facility: CLINIC | Age: 60
End: 2020-10-16

## 2020-10-16 DIAGNOSIS — Z11.59 ENCOUNTER FOR SCREENING FOR OTHER VIRAL DISEASES: ICD-10-CM

## 2020-10-16 LAB
MDC_IDC_EPISODE_DTM: NORMAL
MDC_IDC_EPISODE_DURATION: 0 S
MDC_IDC_EPISODE_DURATION: 1 S
MDC_IDC_EPISODE_DURATION: 16 S
MDC_IDC_EPISODE_DURATION: 181 S
MDC_IDC_EPISODE_DURATION: 182 S
MDC_IDC_EPISODE_DURATION: 183 S
MDC_IDC_EPISODE_DURATION: 184 S
MDC_IDC_EPISODE_DURATION: 184 S
MDC_IDC_EPISODE_DURATION: 186 S
MDC_IDC_EPISODE_DURATION: 187 S
MDC_IDC_EPISODE_DURATION: 200 S
MDC_IDC_EPISODE_DURATION: 208 S
MDC_IDC_EPISODE_DURATION: 21 S
MDC_IDC_EPISODE_DURATION: 2518 S
MDC_IDC_EPISODE_DURATION: 252 S
MDC_IDC_EPISODE_DURATION: 307 S
MDC_IDC_EPISODE_DURATION: 308 S
MDC_IDC_EPISODE_DURATION: 311 S
MDC_IDC_EPISODE_DURATION: 332 S
MDC_IDC_EPISODE_DURATION: 333 S
MDC_IDC_EPISODE_DURATION: 338 S
MDC_IDC_EPISODE_DURATION: 345 S
MDC_IDC_EPISODE_DURATION: 3493 S
MDC_IDC_EPISODE_DURATION: 350 S
MDC_IDC_EPISODE_DURATION: 361 S
MDC_IDC_EPISODE_DURATION: 388 S
MDC_IDC_EPISODE_DURATION: 391 S
MDC_IDC_EPISODE_DURATION: 496 S
MDC_IDC_EPISODE_DURATION: 509 S
MDC_IDC_EPISODE_DURATION: 517 S
MDC_IDC_EPISODE_DURATION: 518 S
MDC_IDC_EPISODE_DURATION: 522 S
MDC_IDC_EPISODE_DURATION: 5305 S
MDC_IDC_EPISODE_DURATION: 548 S
MDC_IDC_EPISODE_DURATION: 565 S
MDC_IDC_EPISODE_DURATION: 665 S
MDC_IDC_EPISODE_DURATION: 665 S
MDC_IDC_EPISODE_DURATION: 7131 S
MDC_IDC_EPISODE_DURATION: 8830 S
MDC_IDC_EPISODE_DURATION: NORMAL S
MDC_IDC_EPISODE_DURATION: NORMAL S
MDC_IDC_EPISODE_ID: 1843
MDC_IDC_EPISODE_ID: 1844
MDC_IDC_EPISODE_ID: 2046
MDC_IDC_EPISODE_ID: 2421
MDC_IDC_EPISODE_ID: 2424
MDC_IDC_EPISODE_ID: 2425
MDC_IDC_EPISODE_ID: 2426
MDC_IDC_EPISODE_ID: 2427
MDC_IDC_EPISODE_ID: 2428
MDC_IDC_EPISODE_ID: 2429
MDC_IDC_EPISODE_ID: 2430
MDC_IDC_EPISODE_ID: 2431
MDC_IDC_EPISODE_ID: 2432
MDC_IDC_EPISODE_ID: 2433
MDC_IDC_EPISODE_ID: 2434
MDC_IDC_EPISODE_ID: 2435
MDC_IDC_EPISODE_ID: 2436
MDC_IDC_EPISODE_ID: 2437
MDC_IDC_EPISODE_ID: 2438
MDC_IDC_EPISODE_ID: 2439
MDC_IDC_EPISODE_ID: 2440
MDC_IDC_EPISODE_ID: 2441
MDC_IDC_EPISODE_ID: 2442
MDC_IDC_EPISODE_ID: 2443
MDC_IDC_EPISODE_ID: 2444
MDC_IDC_EPISODE_ID: 2445
MDC_IDC_EPISODE_ID: 2446
MDC_IDC_EPISODE_ID: 2447
MDC_IDC_EPISODE_ID: 2448
MDC_IDC_EPISODE_ID: 2449
MDC_IDC_EPISODE_ID: 2450
MDC_IDC_EPISODE_ID: 2451
MDC_IDC_EPISODE_ID: 2452
MDC_IDC_EPISODE_ID: 2453
MDC_IDC_EPISODE_ID: 2454
MDC_IDC_EPISODE_ID: 2455
MDC_IDC_EPISODE_ID: 2456
MDC_IDC_EPISODE_ID: 2457
MDC_IDC_EPISODE_ID: 2458
MDC_IDC_EPISODE_ID: 2459
MDC_IDC_EPISODE_ID: 2460
MDC_IDC_EPISODE_ID: 2461
MDC_IDC_EPISODE_ID: 2462
MDC_IDC_EPISODE_ID: 2463
MDC_IDC_EPISODE_ID: 2464
MDC_IDC_EPISODE_ID: 2465
MDC_IDC_EPISODE_ID: 2466
MDC_IDC_EPISODE_ID: 2467
MDC_IDC_EPISODE_ID: 2468
MDC_IDC_EPISODE_ID: 2469
MDC_IDC_EPISODE_ID: 2470
MDC_IDC_EPISODE_ID: 2471
MDC_IDC_EPISODE_ID: 2472
MDC_IDC_EPISODE_ID: 2473
MDC_IDC_EPISODE_ID: 2474
MDC_IDC_EPISODE_TYPE: NORMAL
MDC_IDC_LEAD_IMPLANT_DT: NORMAL
MDC_IDC_LEAD_IMPLANT_DT: NORMAL
MDC_IDC_LEAD_LOCATION: NORMAL
MDC_IDC_LEAD_LOCATION: NORMAL
MDC_IDC_LEAD_LOCATION_DETAIL_1: NORMAL
MDC_IDC_LEAD_LOCATION_DETAIL_1: NORMAL
MDC_IDC_LEAD_MFG: NORMAL
MDC_IDC_LEAD_MFG: NORMAL
MDC_IDC_LEAD_MODEL: NORMAL
MDC_IDC_LEAD_MODEL: NORMAL
MDC_IDC_LEAD_POLARITY_TYPE: NORMAL
MDC_IDC_LEAD_POLARITY_TYPE: NORMAL
MDC_IDC_LEAD_SERIAL: NORMAL
MDC_IDC_LEAD_SERIAL: NORMAL
MDC_IDC_LEAD_SPECIAL_FUNCTION: NORMAL
MDC_IDC_LEAD_SPECIAL_FUNCTION: NORMAL
MDC_IDC_MSMT_BATTERY_DTM: NORMAL
MDC_IDC_MSMT_BATTERY_REMAINING_LONGEVITY: 165 MO
MDC_IDC_MSMT_BATTERY_RRT_TRIGGER: 2.62
MDC_IDC_MSMT_BATTERY_STATUS: NORMAL
MDC_IDC_MSMT_BATTERY_VOLTAGE: 3.06 V
MDC_IDC_MSMT_LEADCHNL_RA_IMPEDANCE_VALUE: 380 OHM
MDC_IDC_MSMT_LEADCHNL_RA_IMPEDANCE_VALUE: 456 OHM
MDC_IDC_MSMT_LEADCHNL_RA_PACING_THRESHOLD_AMPLITUDE: 0.38 V
MDC_IDC_MSMT_LEADCHNL_RA_PACING_THRESHOLD_PULSEWIDTH: 0.4 MS
MDC_IDC_MSMT_LEADCHNL_RA_SENSING_INTR_AMPL: 3.62 MV
MDC_IDC_MSMT_LEADCHNL_RA_SENSING_INTR_AMPL: 4 MV
MDC_IDC_MSMT_LEADCHNL_RV_IMPEDANCE_VALUE: 513 OHM
MDC_IDC_MSMT_LEADCHNL_RV_IMPEDANCE_VALUE: 589 OHM
MDC_IDC_MSMT_LEADCHNL_RV_PACING_THRESHOLD_AMPLITUDE: 0.5 V
MDC_IDC_MSMT_LEADCHNL_RV_PACING_THRESHOLD_AMPLITUDE: 0.5 V
MDC_IDC_MSMT_LEADCHNL_RV_PACING_THRESHOLD_PULSEWIDTH: 0.4 MS
MDC_IDC_MSMT_LEADCHNL_RV_PACING_THRESHOLD_PULSEWIDTH: 0.4 MS
MDC_IDC_MSMT_LEADCHNL_RV_SENSING_INTR_AMPL: 10.5 MV
MDC_IDC_MSMT_LEADCHNL_RV_SENSING_INTR_AMPL: 9.25 MV
MDC_IDC_PG_IMPLANT_DTM: NORMAL
MDC_IDC_PG_MFG: NORMAL
MDC_IDC_PG_MODEL: NORMAL
MDC_IDC_PG_SERIAL: NORMAL
MDC_IDC_PG_TYPE: NORMAL
MDC_IDC_SESS_CLINIC_NAME: NORMAL
MDC_IDC_SESS_DTM: NORMAL
MDC_IDC_SESS_TYPE: NORMAL
MDC_IDC_SET_BRADY_AT_MODE_SWITCH_RATE: 171 {BEATS}/MIN
MDC_IDC_SET_BRADY_HYSTRATE: NORMAL
MDC_IDC_SET_BRADY_LOWRATE: 60 {BEATS}/MIN
MDC_IDC_SET_BRADY_MAX_SENSOR_RATE: 130 {BEATS}/MIN
MDC_IDC_SET_BRADY_MAX_TRACKING_RATE: 130 {BEATS}/MIN
MDC_IDC_SET_BRADY_MODE: NORMAL
MDC_IDC_SET_BRADY_PAV_DELAY_LOW: 180 MS
MDC_IDC_SET_BRADY_SAV_DELAY_LOW: 150 MS
MDC_IDC_SET_LEADCHNL_RA_PACING_AMPLITUDE: 1.5 V
MDC_IDC_SET_LEADCHNL_RA_PACING_ANODE_ELECTRODE_1: NORMAL
MDC_IDC_SET_LEADCHNL_RA_PACING_ANODE_LOCATION_1: NORMAL
MDC_IDC_SET_LEADCHNL_RA_PACING_CAPTURE_MODE: NORMAL
MDC_IDC_SET_LEADCHNL_RA_PACING_CATHODE_ELECTRODE_1: NORMAL
MDC_IDC_SET_LEADCHNL_RA_PACING_CATHODE_LOCATION_1: NORMAL
MDC_IDC_SET_LEADCHNL_RA_PACING_POLARITY: NORMAL
MDC_IDC_SET_LEADCHNL_RA_PACING_PULSEWIDTH: 0.4 MS
MDC_IDC_SET_LEADCHNL_RA_SENSING_ANODE_ELECTRODE_1: NORMAL
MDC_IDC_SET_LEADCHNL_RA_SENSING_ANODE_LOCATION_1: NORMAL
MDC_IDC_SET_LEADCHNL_RA_SENSING_CATHODE_ELECTRODE_1: NORMAL
MDC_IDC_SET_LEADCHNL_RA_SENSING_CATHODE_LOCATION_1: NORMAL
MDC_IDC_SET_LEADCHNL_RA_SENSING_POLARITY: NORMAL
MDC_IDC_SET_LEADCHNL_RA_SENSING_SENSITIVITY: 0.3 MV
MDC_IDC_SET_LEADCHNL_RV_PACING_AMPLITUDE: 2 V
MDC_IDC_SET_LEADCHNL_RV_PACING_ANODE_ELECTRODE_1: NORMAL
MDC_IDC_SET_LEADCHNL_RV_PACING_ANODE_LOCATION_1: NORMAL
MDC_IDC_SET_LEADCHNL_RV_PACING_CAPTURE_MODE: NORMAL
MDC_IDC_SET_LEADCHNL_RV_PACING_CATHODE_ELECTRODE_1: NORMAL
MDC_IDC_SET_LEADCHNL_RV_PACING_CATHODE_LOCATION_1: NORMAL
MDC_IDC_SET_LEADCHNL_RV_PACING_POLARITY: NORMAL
MDC_IDC_SET_LEADCHNL_RV_PACING_PULSEWIDTH: 0.4 MS
MDC_IDC_SET_LEADCHNL_RV_SENSING_ANODE_ELECTRODE_1: NORMAL
MDC_IDC_SET_LEADCHNL_RV_SENSING_ANODE_LOCATION_1: NORMAL
MDC_IDC_SET_LEADCHNL_RV_SENSING_CATHODE_ELECTRODE_1: NORMAL
MDC_IDC_SET_LEADCHNL_RV_SENSING_CATHODE_LOCATION_1: NORMAL
MDC_IDC_SET_LEADCHNL_RV_SENSING_POLARITY: NORMAL
MDC_IDC_SET_LEADCHNL_RV_SENSING_SENSITIVITY: 0.9 MV
MDC_IDC_SET_ZONE_DETECTION_INTERVAL: 350 MS
MDC_IDC_SET_ZONE_DETECTION_INTERVAL: 400 MS
MDC_IDC_SET_ZONE_TYPE: NORMAL
MDC_IDC_STAT_AT_BURDEN_PERCENT: 85.9 %
MDC_IDC_STAT_AT_DTM_END: NORMAL
MDC_IDC_STAT_AT_DTM_START: NORMAL
MDC_IDC_STAT_BRADY_AP_VP_PERCENT: 4.63 %
MDC_IDC_STAT_BRADY_AP_VS_PERCENT: 70.54 %
MDC_IDC_STAT_BRADY_AS_VP_PERCENT: 7.87 %
MDC_IDC_STAT_BRADY_AS_VS_PERCENT: 17.05 %
MDC_IDC_STAT_BRADY_DTM_END: NORMAL
MDC_IDC_STAT_BRADY_DTM_START: NORMAL
MDC_IDC_STAT_BRADY_RA_PERCENT_PACED: 16.75 %
MDC_IDC_STAT_BRADY_RV_PERCENT_PACED: 23.57 %
MDC_IDC_STAT_EPISODE_RECENT_COUNT: 0
MDC_IDC_STAT_EPISODE_RECENT_COUNT: 2416
MDC_IDC_STAT_EPISODE_RECENT_COUNT: 5
MDC_IDC_STAT_EPISODE_RECENT_COUNT_DTM_END: NORMAL
MDC_IDC_STAT_EPISODE_RECENT_COUNT_DTM_START: NORMAL
MDC_IDC_STAT_EPISODE_TOTAL_COUNT: 0
MDC_IDC_STAT_EPISODE_TOTAL_COUNT: 2440
MDC_IDC_STAT_EPISODE_TOTAL_COUNT: 6
MDC_IDC_STAT_EPISODE_TOTAL_COUNT_DTM_END: NORMAL
MDC_IDC_STAT_EPISODE_TOTAL_COUNT_DTM_START: NORMAL
MDC_IDC_STAT_EPISODE_TYPE: NORMAL

## 2020-10-19 ENCOUNTER — COMMUNICATION - HEALTHEAST (OUTPATIENT)
Dept: SCHEDULING | Facility: CLINIC | Age: 60
End: 2020-10-19

## 2020-10-20 ENCOUNTER — SURGERY - HEALTHEAST (OUTPATIENT)
Dept: SURGERY | Facility: CLINIC | Age: 60
End: 2020-10-20

## 2020-10-20 ENCOUNTER — ANESTHESIA - HEALTHEAST (OUTPATIENT)
Dept: SURGERY | Facility: CLINIC | Age: 60
End: 2020-10-20

## 2020-10-20 ASSESSMENT — MIFFLIN-ST. JEOR: SCORE: 1061.67

## 2020-10-24 ENCOUNTER — TELEPHONE (OUTPATIENT)
Dept: FAMILY MEDICINE | Facility: CLINIC | Age: 60
End: 2020-10-24

## 2020-10-26 NOTE — TELEPHONE ENCOUNTER
I don't see that I have prescribed this, I'm not sure who has. Please have patient contact the prescribing provider or look into that. I'm not sure if this was a mistake?  Gala Stringer PA-C

## 2020-10-26 NOTE — TELEPHONE ENCOUNTER
"Routing refill request to provider for review/approval because:  Medication is reported/historical    Requested Prescriptions   Pending Prescriptions Disp Refills     spironolactone (ALDACTONE) 25 MG tablet [Pharmacy Med Name: SPIRONOLACTONE 25MG TABLETS] 90 tablet      Sig: TAKE 1 TABLET BY MOUTH DAILY       Diuretics (Including Combos) Protocol Passed - 10/24/2020  3:29 AM        Passed - Blood pressure under 140/90 in past 12 months     BP Readings from Last 3 Encounters:   10/14/20 122/83   10/09/20 120/81   09/17/20 110/68                 Passed - Recent (12 mo) or future (30 days) visit within the authorizing provider's specialty     Patient has had an office visit with the authorizing provider or a provider within the authorizing providers department within the previous 12 mos or has a future within next 30 days. See \"Patient Info\" tab in inbasket, or \"Choose Columns\" in Meds & Orders section of the refill encounter.              Passed - Medication is active on med list        Passed - Patient is age 18 or older        Passed - No active pregancy on record        Passed - Normal serum creatinine on file in past 12 months     Recent Labs   Lab Test 10/14/20  0958   CR 0.99              Passed - Normal serum potassium on file in past 12 months     Recent Labs   Lab Test 10/14/20  0958   POTASSIUM 3.7                    Passed - Normal serum sodium on file in past 12 months     Recent Labs   Lab Test 10/14/20  0958                 Passed - No positive pregnancy test in past 12 months                   "

## 2020-10-27 RX ORDER — SPIRONOLACTONE 25 MG/1
TABLET ORAL
Qty: 90 TABLET | OUTPATIENT
Start: 2020-10-27

## 2020-10-27 NOTE — TELEPHONE ENCOUNTER
Pharmacy notified by escribe to contact the original Provider that prescribed.  Frederic Power RN

## 2020-10-29 DIAGNOSIS — T38.0X5A STEROID-INDUCED OSTEOPOROSIS: ICD-10-CM

## 2020-10-29 DIAGNOSIS — M05.79 RHEUMATOID ARTHRITIS INVOLVING MULTIPLE SITES WITH POSITIVE RHEUMATOID FACTOR (H): ICD-10-CM

## 2020-10-29 DIAGNOSIS — M81.8 STEROID-INDUCED OSTEOPOROSIS: ICD-10-CM

## 2020-10-29 NOTE — LETTER
Dear Amelia Michel,     Regular eye exams are required while taking hydroxychloroquine (Plaquenil). Eye exams should be completed by an eye specialist who is experienced in monitoring for hydroxychloroquine toxicity (a rare effect of the drug that can damage your eyes and vision).  These may be yearly or as determined by your eye specialist.     Although vision problems and loss of sight while taking hydroxychloroquine are very rare, notify your doctor immediately if you notice changes in your vision. The goal of screening is to detect toxicity before your vision is significantly or noticeably impacted. Failing to get proper screening exams puts you at risk of vision changes which may or may not be reversible.    Per the American Academy of Ophthalmology recommendations (2016), screening tests performed may include a 10-2 visual field test, spectral-domain optical coherence tomography (SD OCT), or other screening tests as determined by the eye specialist, including a multifocal electroretinogram (mfERG) or fundus auto-fluorescence (FAF).    We received a refill request from your pharmacy. A copy of your current eye exam was not found in your medical record. Your hydroxychloroquine prescription has been refilled with a limited supply pending confirmation you have had an eye exam to test for hydroxychloroquine toxicity.      We encourage you to bring this letter to your eye exam to discuss the exams that will be performed during your visit. Please request your eye clinic fax or mail a copy of your eye exam report to our clinic indicating that testing was completed for hydroxychloroquine toxicity screening. The exam notes must specifically comment on the potential for hydroxychloroquine toxicity and outline recommended follow-up.    If you have questions about hydroxychloroquine or the information in this letter, please call the clinic at [phone] or talk to your provider at your next office  visit.                        1    Eye Specialist Letter  Dear Eye Specialist,  To ensure safe use of Plaquenil (hydroxychloroquine), we are requesting your assistance in providing the following information. Please return this form to our clinic via mail or fax, or incorporate this information into your visit summary. Your note must indicate whether or not there is evidence of toxicity from Plaquenil use. For questions regarding this form, please call [phone].  Patient Name:   :             Date of Exam:    The following exams were completed during this visit in accordance with the American Academy of Ophthalmology recommendations (2016):  ? 10-2 automated visual field  ? Spectral-domain optical coherence tomography (SD OCT)  ? Multifocal electroretinogram (mfERG)  ? Fundus autofluorescence (FAF)  ? Other (please specify)  Please select from the following:  ? The findings from the above exams are not suggestive of toxicity from Plaquenil (hydroxychloroquine).  ? The findings from the above exams may suggest toxicity from Plaquenil (hydroxychloroquine).  Directly contact the clinic and fax this form.  Please provide additional guidance on whether or not the medication may be continued from your perspective:  Date of next recommended Plaquenil (hydroxychloroquine) screening eye exam:   ? 5 years  ? 1 year  ? 6 months  Other (please specify):   Eye Specialist Name (print):                                                                Date:  Eye Specialist Signature:

## 2020-11-02 RX ORDER — HYDROXYCHLOROQUINE SULFATE 200 MG/1
200 TABLET, FILM COATED ORAL DAILY
Qty: 90 TABLET | Refills: 1 | Status: SHIPPED | OUTPATIENT
Start: 2020-11-02 | End: 2021-03-10

## 2020-11-02 NOTE — TELEPHONE ENCOUNTER
Plaquenil      Last Written Prescription Date:  11/14/19  Last Fill Quantity: 90,   # refills: 3  Last Office Visit: 9/11/20  Future Office visit: 3/10/21  Last Eye Exam:10/31/19    Routing refill request to provider for review/approval because:  No record of recent eye exam in EPIC - Letter sent today.

## 2020-11-04 ENCOUNTER — TRANSFERRED RECORDS (OUTPATIENT)
Dept: HEALTH INFORMATION MANAGEMENT | Facility: CLINIC | Age: 60
End: 2020-11-04

## 2020-11-23 ENCOUNTER — RECORDS - HEALTHEAST (OUTPATIENT)
Dept: RADIOLOGY | Facility: CLINIC | Age: 60
End: 2020-11-23

## 2020-11-23 ENCOUNTER — RECORDS - HEALTHEAST (OUTPATIENT)
Dept: MAMMOGRAPHY | Facility: CLINIC | Age: 60
End: 2020-11-23

## 2020-11-23 DIAGNOSIS — Z12.31 ENCOUNTER FOR SCREENING MAMMOGRAM FOR MALIGNANT NEOPLASM OF BREAST: ICD-10-CM

## 2020-12-02 ENCOUNTER — TRANSFERRED RECORDS (OUTPATIENT)
Dept: HEALTH INFORMATION MANAGEMENT | Facility: CLINIC | Age: 60
End: 2020-12-02

## 2020-12-29 ENCOUNTER — TRANSFERRED RECORDS (OUTPATIENT)
Dept: HEALTH INFORMATION MANAGEMENT | Facility: CLINIC | Age: 60
End: 2020-12-29

## 2020-12-30 ENCOUNTER — TRANSFERRED RECORDS (OUTPATIENT)
Dept: HEALTH INFORMATION MANAGEMENT | Facility: CLINIC | Age: 60
End: 2020-12-30

## 2020-12-31 ENCOUNTER — TELEPHONE (OUTPATIENT)
Dept: RHEUMATOLOGY | Facility: CLINIC | Age: 60
End: 2020-12-31

## 2021-01-01 NOTE — TELEPHONE ENCOUNTER
"SPIRONOLACTONE 25MG TABS        Last Written Prescription Date:  8/13/18  Last Fill Quantity: 90,   # refills: 0  Last Office Visit: 10/4/18  Future Office visit:       Requested Prescriptions   Pending Prescriptions Disp Refills     spironolactone (ALDACTONE) 25 MG tablet [Pharmacy Med Name: SPIRONOLACTONE 25MG TABS] 90 tablet 0     Sig: TAKE ONE TABLET BY MOUTH EVERY DAY    Diuretics (Including Combos) Protocol Passed    11/4/2018  5:02 AM       Passed - Blood pressure under 140/90 in past 12 months    BP Readings from Last 3 Encounters:   11/01/18 119/78   10/04/18 132/74   09/26/18 136/84                Passed - Recent (12 mo) or future (30 days) visit within the authorizing provider's specialty    Patient had office visit in the last 12 months or has a visit in the next 30 days with authorizing provider or within the authorizing provider's specialty.  See \"Patient Info\" tab in inbasket, or \"Choose Columns\" in Meds & Orders section of the refill encounter.             Passed - Patient is age 18 or older       Passed - No active pregancy on record       Passed - Normal serum creatinine on file in past 12 months    Recent Labs   Lab Test  09/26/18   1414   CR  0.65             Passed - Normal serum potassium on file in past 12 months    Recent Labs   Lab Test  09/26/18   1414   POTASSIUM  4.0                   Passed - Normal serum sodium on file in past 12 months    Recent Labs   Lab Test  09/26/18   1414   NA  135             Passed - No positive pregnancy test in past 12 months          "
shannan

## 2021-01-10 ENCOUNTER — HEALTH MAINTENANCE LETTER (OUTPATIENT)
Age: 61
End: 2021-01-10

## 2021-01-16 ENCOUNTER — HEALTH MAINTENANCE LETTER (OUTPATIENT)
Age: 61
End: 2021-01-16

## 2021-01-20 ENCOUNTER — ANCILLARY PROCEDURE (OUTPATIENT)
Dept: CARDIOLOGY | Facility: CLINIC | Age: 61
End: 2021-01-20
Attending: INTERNAL MEDICINE
Payer: COMMERCIAL

## 2021-01-20 DIAGNOSIS — R00.1 BRADYCARDIA: ICD-10-CM

## 2021-01-20 PROCEDURE — 93294 REM INTERROG EVL PM/LDLS PM: CPT | Performed by: INTERNAL MEDICINE

## 2021-01-20 PROCEDURE — 93296 REM INTERROG EVL PM/IDS: CPT

## 2021-01-21 LAB
MDC_IDC_EPISODE_DTM: NORMAL
MDC_IDC_EPISODE_DURATION: 1 S
MDC_IDC_EPISODE_DURATION: 105 S
MDC_IDC_EPISODE_DURATION: 11 S
MDC_IDC_EPISODE_DURATION: 1124 S
MDC_IDC_EPISODE_DURATION: 1142 S
MDC_IDC_EPISODE_DURATION: 1215 S
MDC_IDC_EPISODE_DURATION: 13 S
MDC_IDC_EPISODE_DURATION: 1306 S
MDC_IDC_EPISODE_DURATION: 1424 S
MDC_IDC_EPISODE_DURATION: 149 S
MDC_IDC_EPISODE_DURATION: 152 S
MDC_IDC_EPISODE_DURATION: 1709 S
MDC_IDC_EPISODE_DURATION: 180 S
MDC_IDC_EPISODE_DURATION: 181 S
MDC_IDC_EPISODE_DURATION: 1813 S
MDC_IDC_EPISODE_DURATION: 182 S
MDC_IDC_EPISODE_DURATION: 183 S
MDC_IDC_EPISODE_DURATION: 184 S
MDC_IDC_EPISODE_DURATION: 184 S
MDC_IDC_EPISODE_DURATION: 185 S
MDC_IDC_EPISODE_DURATION: 187 S
MDC_IDC_EPISODE_DURATION: 20 S
MDC_IDC_EPISODE_DURATION: 202 S
MDC_IDC_EPISODE_DURATION: 205 S
MDC_IDC_EPISODE_DURATION: 2082 S
MDC_IDC_EPISODE_DURATION: 21 S
MDC_IDC_EPISODE_DURATION: 2131 S
MDC_IDC_EPISODE_DURATION: 230 S
MDC_IDC_EPISODE_DURATION: 255 S
MDC_IDC_EPISODE_DURATION: 257 S
MDC_IDC_EPISODE_DURATION: 279 S
MDC_IDC_EPISODE_DURATION: 28 S
MDC_IDC_EPISODE_DURATION: 282 S
MDC_IDC_EPISODE_DURATION: 283 S
MDC_IDC_EPISODE_DURATION: 294 S
MDC_IDC_EPISODE_DURATION: 304 S
MDC_IDC_EPISODE_DURATION: 31 S
MDC_IDC_EPISODE_DURATION: 312 S
MDC_IDC_EPISODE_DURATION: 324 S
MDC_IDC_EPISODE_DURATION: 325 S
MDC_IDC_EPISODE_DURATION: 344 S
MDC_IDC_EPISODE_DURATION: 38 S
MDC_IDC_EPISODE_DURATION: 395 S
MDC_IDC_EPISODE_DURATION: 4109 S
MDC_IDC_EPISODE_DURATION: 427 S
MDC_IDC_EPISODE_DURATION: 44 S
MDC_IDC_EPISODE_DURATION: 441 S
MDC_IDC_EPISODE_DURATION: 461 S
MDC_IDC_EPISODE_DURATION: 47 S
MDC_IDC_EPISODE_DURATION: 48 S
MDC_IDC_EPISODE_DURATION: 481 S
MDC_IDC_EPISODE_DURATION: 5128 S
MDC_IDC_EPISODE_DURATION: 53 S
MDC_IDC_EPISODE_DURATION: 53 S
MDC_IDC_EPISODE_DURATION: 5387 S
MDC_IDC_EPISODE_DURATION: 5642 S
MDC_IDC_EPISODE_DURATION: 57 S
MDC_IDC_EPISODE_DURATION: 600 S
MDC_IDC_EPISODE_DURATION: 6487 S
MDC_IDC_EPISODE_DURATION: 654 S
MDC_IDC_EPISODE_DURATION: 66 S
MDC_IDC_EPISODE_DURATION: 78 S
MDC_IDC_EPISODE_DURATION: 78 S
MDC_IDC_EPISODE_DURATION: 83 S
MDC_IDC_EPISODE_DURATION: 84 S
MDC_IDC_EPISODE_DURATION: 899 S
MDC_IDC_EPISODE_DURATION: 9 S
MDC_IDC_EPISODE_DURATION: 923 S
MDC_IDC_EPISODE_DURATION: 994 S
MDC_IDC_EPISODE_DURATION: NORMAL S
MDC_IDC_EPISODE_ID: 3112
MDC_IDC_EPISODE_ID: 3286
MDC_IDC_EPISODE_ID: 3408
MDC_IDC_EPISODE_ID: 3411
MDC_IDC_EPISODE_ID: 3566
MDC_IDC_EPISODE_ID: 3569
MDC_IDC_EPISODE_ID: 3570
MDC_IDC_EPISODE_ID: 3571
MDC_IDC_EPISODE_ID: 3590
MDC_IDC_EPISODE_ID: 3591
MDC_IDC_EPISODE_ID: 3593
MDC_IDC_EPISODE_ID: 3628
MDC_IDC_EPISODE_ID: 3629
MDC_IDC_EPISODE_ID: 3634
MDC_IDC_EPISODE_ID: 3636
MDC_IDC_EPISODE_ID: 3637
MDC_IDC_EPISODE_ID: 3638
MDC_IDC_EPISODE_ID: 3640
MDC_IDC_EPISODE_ID: 3641
MDC_IDC_EPISODE_ID: 3642
MDC_IDC_EPISODE_ID: 3643
MDC_IDC_EPISODE_ID: 3644
MDC_IDC_EPISODE_ID: 3645
MDC_IDC_EPISODE_ID: 3646
MDC_IDC_EPISODE_ID: 3647
MDC_IDC_EPISODE_ID: 3648
MDC_IDC_EPISODE_ID: 3649
MDC_IDC_EPISODE_ID: 3650
MDC_IDC_EPISODE_ID: 3651
MDC_IDC_EPISODE_ID: 3652
MDC_IDC_EPISODE_ID: 3653
MDC_IDC_EPISODE_ID: 3654
MDC_IDC_EPISODE_ID: 3655
MDC_IDC_EPISODE_ID: 3656
MDC_IDC_EPISODE_ID: 3657
MDC_IDC_EPISODE_ID: 3658
MDC_IDC_EPISODE_ID: 3659
MDC_IDC_EPISODE_ID: 3660
MDC_IDC_EPISODE_ID: 3661
MDC_IDC_EPISODE_ID: 3662
MDC_IDC_EPISODE_ID: 3663
MDC_IDC_EPISODE_ID: 3664
MDC_IDC_EPISODE_ID: 3665
MDC_IDC_EPISODE_ID: 3666
MDC_IDC_EPISODE_ID: 3667
MDC_IDC_EPISODE_ID: 3668
MDC_IDC_EPISODE_ID: 3669
MDC_IDC_EPISODE_ID: 3670
MDC_IDC_EPISODE_ID: 3671
MDC_IDC_EPISODE_ID: 3672
MDC_IDC_EPISODE_ID: 3673
MDC_IDC_EPISODE_ID: 3674
MDC_IDC_EPISODE_ID: 3675
MDC_IDC_EPISODE_ID: 3676
MDC_IDC_EPISODE_ID: 3677
MDC_IDC_EPISODE_ID: 3678
MDC_IDC_EPISODE_ID: 3679
MDC_IDC_EPISODE_ID: 3680
MDC_IDC_EPISODE_ID: 3681
MDC_IDC_EPISODE_ID: 3682
MDC_IDC_EPISODE_ID: 3683
MDC_IDC_EPISODE_ID: 3684
MDC_IDC_EPISODE_ID: 3685
MDC_IDC_EPISODE_ID: 3686
MDC_IDC_EPISODE_ID: 3687
MDC_IDC_EPISODE_ID: 3688
MDC_IDC_EPISODE_ID: 3689
MDC_IDC_EPISODE_ID: 3690
MDC_IDC_EPISODE_ID: 3691
MDC_IDC_EPISODE_ID: 3692
MDC_IDC_EPISODE_ID: 3693
MDC_IDC_EPISODE_ID: 3694
MDC_IDC_EPISODE_ID: 3695
MDC_IDC_EPISODE_ID: 3696
MDC_IDC_EPISODE_ID: 3697
MDC_IDC_EPISODE_ID: 3698
MDC_IDC_EPISODE_ID: 3699
MDC_IDC_EPISODE_ID: 3700
MDC_IDC_EPISODE_ID: 3701
MDC_IDC_EPISODE_ID: 3702
MDC_IDC_EPISODE_ID: 3703
MDC_IDC_EPISODE_ID: 3704
MDC_IDC_EPISODE_TYPE: NORMAL
MDC_IDC_LEAD_IMPLANT_DT: NORMAL
MDC_IDC_LEAD_IMPLANT_DT: NORMAL
MDC_IDC_LEAD_LOCATION: NORMAL
MDC_IDC_LEAD_LOCATION: NORMAL
MDC_IDC_LEAD_LOCATION_DETAIL_1: NORMAL
MDC_IDC_LEAD_LOCATION_DETAIL_1: NORMAL
MDC_IDC_LEAD_MFG: NORMAL
MDC_IDC_LEAD_MFG: NORMAL
MDC_IDC_LEAD_MODEL: NORMAL
MDC_IDC_LEAD_MODEL: NORMAL
MDC_IDC_LEAD_POLARITY_TYPE: NORMAL
MDC_IDC_LEAD_POLARITY_TYPE: NORMAL
MDC_IDC_LEAD_SERIAL: NORMAL
MDC_IDC_LEAD_SERIAL: NORMAL
MDC_IDC_LEAD_SPECIAL_FUNCTION: NORMAL
MDC_IDC_LEAD_SPECIAL_FUNCTION: NORMAL
MDC_IDC_MSMT_BATTERY_DTM: NORMAL
MDC_IDC_MSMT_BATTERY_REMAINING_LONGEVITY: 162 MO
MDC_IDC_MSMT_BATTERY_RRT_TRIGGER: 2.62
MDC_IDC_MSMT_BATTERY_STATUS: NORMAL
MDC_IDC_MSMT_BATTERY_VOLTAGE: 3.04 V
MDC_IDC_MSMT_LEADCHNL_RA_IMPEDANCE_VALUE: 323 OHM
MDC_IDC_MSMT_LEADCHNL_RA_IMPEDANCE_VALUE: 399 OHM
MDC_IDC_MSMT_LEADCHNL_RA_PACING_THRESHOLD_AMPLITUDE: 0.38 V
MDC_IDC_MSMT_LEADCHNL_RA_PACING_THRESHOLD_PULSEWIDTH: 0.4 MS
MDC_IDC_MSMT_LEADCHNL_RA_SENSING_INTR_AMPL: 2.75 MV
MDC_IDC_MSMT_LEADCHNL_RA_SENSING_INTR_AMPL: 2.75 MV
MDC_IDC_MSMT_LEADCHNL_RV_IMPEDANCE_VALUE: 361 OHM
MDC_IDC_MSMT_LEADCHNL_RV_IMPEDANCE_VALUE: 437 OHM
MDC_IDC_MSMT_LEADCHNL_RV_PACING_THRESHOLD_AMPLITUDE: 0.62 V
MDC_IDC_MSMT_LEADCHNL_RV_PACING_THRESHOLD_PULSEWIDTH: 0.4 MS
MDC_IDC_MSMT_LEADCHNL_RV_SENSING_INTR_AMPL: 7.25 MV
MDC_IDC_MSMT_LEADCHNL_RV_SENSING_INTR_AMPL: 7.25 MV
MDC_IDC_PG_IMPLANT_DTM: NORMAL
MDC_IDC_PG_MFG: NORMAL
MDC_IDC_PG_MODEL: NORMAL
MDC_IDC_PG_SERIAL: NORMAL
MDC_IDC_PG_TYPE: NORMAL
MDC_IDC_SESS_CLINIC_NAME: NORMAL
MDC_IDC_SESS_DTM: NORMAL
MDC_IDC_SESS_TYPE: NORMAL
MDC_IDC_SET_BRADY_AT_MODE_SWITCH_RATE: 171 {BEATS}/MIN
MDC_IDC_SET_BRADY_HYSTRATE: NORMAL
MDC_IDC_SET_BRADY_LOWRATE: 60 {BEATS}/MIN
MDC_IDC_SET_BRADY_MAX_SENSOR_RATE: 130 {BEATS}/MIN
MDC_IDC_SET_BRADY_MAX_TRACKING_RATE: 130 {BEATS}/MIN
MDC_IDC_SET_BRADY_MODE: NORMAL
MDC_IDC_SET_BRADY_PAV_DELAY_LOW: 180 MS
MDC_IDC_SET_BRADY_SAV_DELAY_LOW: 150 MS
MDC_IDC_SET_LEADCHNL_RA_PACING_AMPLITUDE: 1.5 V
MDC_IDC_SET_LEADCHNL_RA_PACING_ANODE_ELECTRODE_1: NORMAL
MDC_IDC_SET_LEADCHNL_RA_PACING_ANODE_LOCATION_1: NORMAL
MDC_IDC_SET_LEADCHNL_RA_PACING_CAPTURE_MODE: NORMAL
MDC_IDC_SET_LEADCHNL_RA_PACING_CATHODE_ELECTRODE_1: NORMAL
MDC_IDC_SET_LEADCHNL_RA_PACING_CATHODE_LOCATION_1: NORMAL
MDC_IDC_SET_LEADCHNL_RA_PACING_POLARITY: NORMAL
MDC_IDC_SET_LEADCHNL_RA_PACING_PULSEWIDTH: 0.4 MS
MDC_IDC_SET_LEADCHNL_RA_SENSING_ANODE_ELECTRODE_1: NORMAL
MDC_IDC_SET_LEADCHNL_RA_SENSING_ANODE_LOCATION_1: NORMAL
MDC_IDC_SET_LEADCHNL_RA_SENSING_CATHODE_ELECTRODE_1: NORMAL
MDC_IDC_SET_LEADCHNL_RA_SENSING_CATHODE_LOCATION_1: NORMAL
MDC_IDC_SET_LEADCHNL_RA_SENSING_POLARITY: NORMAL
MDC_IDC_SET_LEADCHNL_RA_SENSING_SENSITIVITY: 0.3 MV
MDC_IDC_SET_LEADCHNL_RV_PACING_AMPLITUDE: 2 V
MDC_IDC_SET_LEADCHNL_RV_PACING_ANODE_ELECTRODE_1: NORMAL
MDC_IDC_SET_LEADCHNL_RV_PACING_ANODE_LOCATION_1: NORMAL
MDC_IDC_SET_LEADCHNL_RV_PACING_CAPTURE_MODE: NORMAL
MDC_IDC_SET_LEADCHNL_RV_PACING_CATHODE_ELECTRODE_1: NORMAL
MDC_IDC_SET_LEADCHNL_RV_PACING_CATHODE_LOCATION_1: NORMAL
MDC_IDC_SET_LEADCHNL_RV_PACING_POLARITY: NORMAL
MDC_IDC_SET_LEADCHNL_RV_PACING_PULSEWIDTH: 0.4 MS
MDC_IDC_SET_LEADCHNL_RV_SENSING_ANODE_ELECTRODE_1: NORMAL
MDC_IDC_SET_LEADCHNL_RV_SENSING_ANODE_LOCATION_1: NORMAL
MDC_IDC_SET_LEADCHNL_RV_SENSING_CATHODE_ELECTRODE_1: NORMAL
MDC_IDC_SET_LEADCHNL_RV_SENSING_CATHODE_LOCATION_1: NORMAL
MDC_IDC_SET_LEADCHNL_RV_SENSING_POLARITY: NORMAL
MDC_IDC_SET_LEADCHNL_RV_SENSING_SENSITIVITY: 0.9 MV
MDC_IDC_SET_ZONE_DETECTION_INTERVAL: 350 MS
MDC_IDC_SET_ZONE_DETECTION_INTERVAL: 400 MS
MDC_IDC_SET_ZONE_TYPE: NORMAL
MDC_IDC_STAT_BRADY_AP_VP_PERCENT: 2.31 %
MDC_IDC_STAT_BRADY_AP_VS_PERCENT: 2.19 %
MDC_IDC_STAT_BRADY_AS_VP_PERCENT: 4.23 %
MDC_IDC_STAT_BRADY_AS_VS_PERCENT: 91.35 %
MDC_IDC_STAT_BRADY_DTM_END: NORMAL
MDC_IDC_STAT_BRADY_DTM_START: NORMAL
MDC_IDC_STAT_BRADY_RA_PERCENT_PACED: 1.86 %
MDC_IDC_STAT_BRADY_RV_PERCENT_PACED: 5.26 %
MDC_IDC_STAT_EPISODE_RECENT_COUNT: 0
MDC_IDC_STAT_EPISODE_RECENT_COUNT: 0
MDC_IDC_STAT_EPISODE_RECENT_COUNT: 1147
MDC_IDC_STAT_EPISODE_RECENT_COUNT_DTM_END: NORMAL
MDC_IDC_STAT_EPISODE_RECENT_COUNT_DTM_START: NORMAL
MDC_IDC_STAT_EPISODE_TOTAL_COUNT: 0
MDC_IDC_STAT_EPISODE_TOTAL_COUNT: 3587
MDC_IDC_STAT_EPISODE_TOTAL_COUNT: 6
MDC_IDC_STAT_EPISODE_TOTAL_COUNT_DTM_END: NORMAL
MDC_IDC_STAT_EPISODE_TOTAL_COUNT_DTM_START: NORMAL
MDC_IDC_STAT_EPISODE_TYPE: NORMAL

## 2021-01-27 ENCOUNTER — TRANSFERRED RECORDS (OUTPATIENT)
Dept: HEALTH INFORMATION MANAGEMENT | Facility: CLINIC | Age: 61
End: 2021-01-27

## 2021-01-29 DIAGNOSIS — I48.0 PAROXYSMAL ATRIAL FIBRILLATION (H): ICD-10-CM

## 2021-02-01 ENCOUNTER — TELEPHONE (OUTPATIENT)
Dept: FAMILY MEDICINE | Facility: CLINIC | Age: 61
End: 2021-02-01

## 2021-02-01 NOTE — TELEPHONE ENCOUNTER
Prior Authorization Retail Medication Request    Medication/Dose: Gabapentin 100mg capsules  ICD code (if different than what is on RX)  Previously Tried and Failed  Rationale:  Plan does not cover medication - quantity limits.      Insurance Name:    Insurance ID:  [id number]      Pharmacy Information (if different than what is on RX)  Name:  [facility]  Phone:  [phone]

## 2021-02-01 NOTE — TELEPHONE ENCOUNTER
Prior Authorization Approval    Authorization Effective Date: 1/2/2021  Authorization Expiration Date: 2/1/2022  Medication: Gabapentin 100mg capsules  Approved Dose/Quantity:   Reference #: [id number]   Insurance Company: [employer] - Phone [phone] Fax [phone]  Expected CoPay:       CoPay Card Available:      Foundation Assistance Needed:    Which Pharmacy is filling the prescription (Not needed for infusion/clinic administered): [id number] [place], [place] - [address] AT [facility] [address]  Pharmacy Notified: Yes  Patient Notified: Yes, **Instructed pharmacy to notify patient when script is ready to /ship.**

## 2021-02-01 NOTE — TELEPHONE ENCOUNTER
PA Initiation    Medication: Gabapentin 100mg capsules  Insurance Company: [employer] - Phone [phone] Fax [phone]  Pharmacy Filling the Rx: [id number] [place], [place] - [address] AT [facility] [address]  Filling Pharmacy Phone: [phone]  Filling Pharmacy Fax: [phone]  Start Date: 2/1/2021

## 2021-02-05 ENCOUNTER — ANCILLARY PROCEDURE (OUTPATIENT)
Dept: CARDIOLOGY | Facility: CLINIC | Age: 61
End: 2021-02-05
Attending: INTERNAL MEDICINE
Payer: COMMERCIAL

## 2021-02-05 ENCOUNTER — TELEPHONE (OUTPATIENT)
Dept: CARDIOLOGY | Facility: CLINIC | Age: 61
End: 2021-02-05

## 2021-02-05 DIAGNOSIS — R00.1 BRADYCARDIA: ICD-10-CM

## 2021-02-05 PROCEDURE — 93296 REM INTERROG EVL PM/IDS: CPT

## 2021-02-05 PROCEDURE — 99207 CARDIAC DEVICE CHECK - REMOTE: CPT | Performed by: INTERNAL MEDICINE

## 2021-02-05 NOTE — TELEPHONE ENCOUNTER
Received follow up from Maria Esther Dao NP:    Reviewed device information. She remains in AF/AFL most of the time. For some reason she just had a transient increase in HR yesterday, but overall has been fairly rate controlled. We would not change too much if she is feeling better now, but certainly want her to keep us posted if she has further symptoms. If further episodes, we would see her earlier then her scheduled follow-up   Thanks,  SALVADORW     Called and reviewed information with Amelia. Patient verbalized understanding and is in agreement to the plan.

## 2021-02-05 NOTE — TELEPHONE ENCOUNTER
[facility] Call Center    Phone Message    May a detailed message be left on voicemail: no     Reason for Call: Other: Malia Monge calling to reach Care Team.  In & Out of AFib all night.  Please call: [phone]     Action Taken: Message routed to:  [facility] ([facility]): Cardiology    Travel Screening: Not Applicable

## 2021-02-05 NOTE — TELEPHONE ENCOUNTER
Received message about Amelia. Called and spoke with her. She noticed Afib yesterday afternoon on and off. She was able to get out of it until dinner time, then it was on and off again overnight. Her heart rate vernight was 130-150s bpm. HR this morning is 120. She endorses that she is auseated, tired, and short of breath.     Patient does not have any significant changes to her life. No recent Covid. She did have a sinus issue last week, but it self resolved. She had surgery on ankle in October 2020, so doing physical therapy twice a week. No other changes.     Told patient that I would reach out to team and be in touch with recommendations. Patient verbalized understanding and is in agreement to the plan.

## 2021-02-19 LAB
MDC_IDC_EPISODE_DTM: NORMAL
MDC_IDC_EPISODE_DURATION: 182 S
MDC_IDC_EPISODE_DURATION: 187 S
MDC_IDC_EPISODE_DURATION: 22 S
MDC_IDC_EPISODE_DURATION: 2524 S
MDC_IDC_EPISODE_DURATION: 299 S
MDC_IDC_EPISODE_DURATION: 382 S
MDC_IDC_EPISODE_DURATION: 403 S
MDC_IDC_EPISODE_DURATION: 49 S
MDC_IDC_EPISODE_DURATION: 5024 S
MDC_IDC_EPISODE_DURATION: 522 S
MDC_IDC_EPISODE_DURATION: 592 S
MDC_IDC_EPISODE_DURATION: 60 S
MDC_IDC_EPISODE_DURATION: 686 S
MDC_IDC_EPISODE_DURATION: 7 S
MDC_IDC_EPISODE_DURATION: 786 S
MDC_IDC_EPISODE_DURATION: 7941 S
MDC_IDC_EPISODE_DURATION: 988 S
MDC_IDC_EPISODE_ID: 3705
MDC_IDC_EPISODE_ID: 3706
MDC_IDC_EPISODE_ID: 3707
MDC_IDC_EPISODE_ID: 3708
MDC_IDC_EPISODE_ID: 3709
MDC_IDC_EPISODE_ID: 3710
MDC_IDC_EPISODE_ID: 3711
MDC_IDC_EPISODE_ID: 3712
MDC_IDC_EPISODE_ID: 3713
MDC_IDC_EPISODE_ID: 3714
MDC_IDC_EPISODE_ID: 3715
MDC_IDC_EPISODE_ID: 3716
MDC_IDC_EPISODE_ID: 3717
MDC_IDC_EPISODE_ID: 3718
MDC_IDC_EPISODE_ID: 3719
MDC_IDC_EPISODE_ID: 3720
MDC_IDC_EPISODE_ID: 3721
MDC_IDC_EPISODE_TYPE: NORMAL
MDC_IDC_LEAD_IMPLANT_DT: NORMAL
MDC_IDC_LEAD_IMPLANT_DT: NORMAL
MDC_IDC_LEAD_LOCATION: NORMAL
MDC_IDC_LEAD_LOCATION: NORMAL
MDC_IDC_LEAD_LOCATION_DETAIL_1: NORMAL
MDC_IDC_LEAD_LOCATION_DETAIL_1: NORMAL
MDC_IDC_LEAD_MFG: NORMAL
MDC_IDC_LEAD_MFG: NORMAL
MDC_IDC_LEAD_MODEL: NORMAL
MDC_IDC_LEAD_MODEL: NORMAL
MDC_IDC_LEAD_POLARITY_TYPE: NORMAL
MDC_IDC_LEAD_POLARITY_TYPE: NORMAL
MDC_IDC_LEAD_SERIAL: NORMAL
MDC_IDC_LEAD_SERIAL: NORMAL
MDC_IDC_LEAD_SPECIAL_FUNCTION: NORMAL
MDC_IDC_LEAD_SPECIAL_FUNCTION: NORMAL
MDC_IDC_MSMT_BATTERY_DTM: NORMAL
MDC_IDC_MSMT_BATTERY_REMAINING_LONGEVITY: 161 MO
MDC_IDC_MSMT_BATTERY_RRT_TRIGGER: 2.62
MDC_IDC_MSMT_BATTERY_STATUS: NORMAL
MDC_IDC_MSMT_BATTERY_VOLTAGE: 3.04 V
MDC_IDC_MSMT_LEADCHNL_RA_IMPEDANCE_VALUE: 323 OHM
MDC_IDC_MSMT_LEADCHNL_RA_IMPEDANCE_VALUE: 399 OHM
MDC_IDC_MSMT_LEADCHNL_RA_PACING_THRESHOLD_AMPLITUDE: 0.38 V
MDC_IDC_MSMT_LEADCHNL_RA_PACING_THRESHOLD_PULSEWIDTH: 0.4 MS
MDC_IDC_MSMT_LEADCHNL_RA_SENSING_INTR_AMPL: 3 MV
MDC_IDC_MSMT_LEADCHNL_RA_SENSING_INTR_AMPL: 3 MV
MDC_IDC_MSMT_LEADCHNL_RV_IMPEDANCE_VALUE: 361 OHM
MDC_IDC_MSMT_LEADCHNL_RV_IMPEDANCE_VALUE: 437 OHM
MDC_IDC_MSMT_LEADCHNL_RV_PACING_THRESHOLD_AMPLITUDE: 0.62 V
MDC_IDC_MSMT_LEADCHNL_RV_PACING_THRESHOLD_PULSEWIDTH: 0.4 MS
MDC_IDC_MSMT_LEADCHNL_RV_SENSING_INTR_AMPL: 7 MV
MDC_IDC_MSMT_LEADCHNL_RV_SENSING_INTR_AMPL: 7 MV
MDC_IDC_PG_IMPLANT_DTM: NORMAL
MDC_IDC_PG_MFG: NORMAL
MDC_IDC_PG_MODEL: NORMAL
MDC_IDC_PG_SERIAL: NORMAL
MDC_IDC_PG_TYPE: NORMAL
MDC_IDC_SESS_CLINIC_NAME: NORMAL
MDC_IDC_SESS_DTM: NORMAL
MDC_IDC_SESS_TYPE: NORMAL
MDC_IDC_SET_BRADY_AT_MODE_SWITCH_RATE: 171 {BEATS}/MIN
MDC_IDC_SET_BRADY_HYSTRATE: NORMAL
MDC_IDC_SET_BRADY_LOWRATE: 60 {BEATS}/MIN
MDC_IDC_SET_BRADY_MAX_SENSOR_RATE: 130 {BEATS}/MIN
MDC_IDC_SET_BRADY_MAX_TRACKING_RATE: 130 {BEATS}/MIN
MDC_IDC_SET_BRADY_MODE: NORMAL
MDC_IDC_SET_BRADY_PAV_DELAY_LOW: 180 MS
MDC_IDC_SET_BRADY_SAV_DELAY_LOW: 150 MS
MDC_IDC_SET_LEADCHNL_RA_PACING_AMPLITUDE: 1.5 V
MDC_IDC_SET_LEADCHNL_RA_PACING_ANODE_ELECTRODE_1: NORMAL
MDC_IDC_SET_LEADCHNL_RA_PACING_ANODE_LOCATION_1: NORMAL
MDC_IDC_SET_LEADCHNL_RA_PACING_CAPTURE_MODE: NORMAL
MDC_IDC_SET_LEADCHNL_RA_PACING_CATHODE_ELECTRODE_1: NORMAL
MDC_IDC_SET_LEADCHNL_RA_PACING_CATHODE_LOCATION_1: NORMAL
MDC_IDC_SET_LEADCHNL_RA_PACING_POLARITY: NORMAL
MDC_IDC_SET_LEADCHNL_RA_PACING_PULSEWIDTH: 0.4 MS
MDC_IDC_SET_LEADCHNL_RA_SENSING_ANODE_ELECTRODE_1: NORMAL
MDC_IDC_SET_LEADCHNL_RA_SENSING_ANODE_LOCATION_1: NORMAL
MDC_IDC_SET_LEADCHNL_RA_SENSING_CATHODE_ELECTRODE_1: NORMAL
MDC_IDC_SET_LEADCHNL_RA_SENSING_CATHODE_LOCATION_1: NORMAL
MDC_IDC_SET_LEADCHNL_RA_SENSING_POLARITY: NORMAL
MDC_IDC_SET_LEADCHNL_RA_SENSING_SENSITIVITY: 0.3 MV
MDC_IDC_SET_LEADCHNL_RV_PACING_AMPLITUDE: 2 V
MDC_IDC_SET_LEADCHNL_RV_PACING_ANODE_ELECTRODE_1: NORMAL
MDC_IDC_SET_LEADCHNL_RV_PACING_ANODE_LOCATION_1: NORMAL
MDC_IDC_SET_LEADCHNL_RV_PACING_CAPTURE_MODE: NORMAL
MDC_IDC_SET_LEADCHNL_RV_PACING_CATHODE_ELECTRODE_1: NORMAL
MDC_IDC_SET_LEADCHNL_RV_PACING_CATHODE_LOCATION_1: NORMAL
MDC_IDC_SET_LEADCHNL_RV_PACING_POLARITY: NORMAL
MDC_IDC_SET_LEADCHNL_RV_PACING_PULSEWIDTH: 0.4 MS
MDC_IDC_SET_LEADCHNL_RV_SENSING_ANODE_ELECTRODE_1: NORMAL
MDC_IDC_SET_LEADCHNL_RV_SENSING_ANODE_LOCATION_1: NORMAL
MDC_IDC_SET_LEADCHNL_RV_SENSING_CATHODE_ELECTRODE_1: NORMAL
MDC_IDC_SET_LEADCHNL_RV_SENSING_CATHODE_LOCATION_1: NORMAL
MDC_IDC_SET_LEADCHNL_RV_SENSING_POLARITY: NORMAL
MDC_IDC_SET_LEADCHNL_RV_SENSING_SENSITIVITY: 0.9 MV
MDC_IDC_SET_ZONE_DETECTION_INTERVAL: 350 MS
MDC_IDC_SET_ZONE_DETECTION_INTERVAL: 400 MS
MDC_IDC_SET_ZONE_TYPE: NORMAL
MDC_IDC_STAT_BRADY_AP_VP_PERCENT: 0.52 %
MDC_IDC_STAT_BRADY_AP_VS_PERCENT: 0.08 %
MDC_IDC_STAT_BRADY_AS_VP_PERCENT: 0.03 %
MDC_IDC_STAT_BRADY_AS_VS_PERCENT: 99.34 %
MDC_IDC_STAT_BRADY_DTM_END: NORMAL
MDC_IDC_STAT_BRADY_DTM_START: NORMAL
MDC_IDC_STAT_BRADY_RA_PERCENT_PACED: 0.04 %
MDC_IDC_STAT_BRADY_RV_PERCENT_PACED: 1.52 %
MDC_IDC_STAT_EPISODE_RECENT_COUNT: 0
MDC_IDC_STAT_EPISODE_RECENT_COUNT: 0
MDC_IDC_STAT_EPISODE_RECENT_COUNT: 2
MDC_IDC_STAT_EPISODE_RECENT_COUNT_DTM_END: NORMAL
MDC_IDC_STAT_EPISODE_RECENT_COUNT_DTM_START: NORMAL
MDC_IDC_STAT_EPISODE_TOTAL_COUNT: 0
MDC_IDC_STAT_EPISODE_TOTAL_COUNT: 3589
MDC_IDC_STAT_EPISODE_TOTAL_COUNT: 6
MDC_IDC_STAT_EPISODE_TOTAL_COUNT_DTM_END: NORMAL
MDC_IDC_STAT_EPISODE_TOTAL_COUNT_DTM_START: NORMAL
MDC_IDC_STAT_EPISODE_TYPE: NORMAL

## 2021-03-03 DIAGNOSIS — M81.0 OSTEOPOROSIS, UNSPECIFIED OSTEOPOROSIS TYPE, UNSPECIFIED PATHOLOGICAL FRACTURE PRESENCE: ICD-10-CM

## 2021-03-03 DIAGNOSIS — Z79.52 LONG TERM (CURRENT) USE OF SYSTEMIC STEROIDS: ICD-10-CM

## 2021-03-03 NOTE — TELEPHONE ENCOUNTER
alendronate (FOSAMAX) 70 MG tablet      Last Written Prescription Date:  9/11/20  Last Fill Quantity: 12,   # refills: 1  Last Office Visit : 9/11/20 recommended 3 and 6 month follow ups  Future Office visit:  None scheduled    Routing refill request to provider for review/approval because:  Overdue for dexa last one 11/16/18  Nothing more recent in care everywhere nor media

## 2021-03-04 RX ORDER — ALENDRONATE SODIUM 70 MG/1
70 TABLET ORAL
Qty: 12 TABLET | Refills: 0 | Status: SHIPPED | OUTPATIENT
Start: 2021-03-04 | End: 2021-05-25

## 2021-03-07 DIAGNOSIS — I47.19 ATRIAL TACHYCARDIA (H): ICD-10-CM

## 2021-03-09 RX ORDER — ATENOLOL 25 MG/1
TABLET ORAL
Qty: 180 TABLET | Refills: 3 | Status: SHIPPED | OUTPATIENT
Start: 2021-03-09 | End: 2021-05-26

## 2021-03-09 NOTE — TELEPHONE ENCOUNTER
Routing refill request to provider for review/approval because:  Drug interaction warning    Conner Queen, RN

## 2021-03-10 ENCOUNTER — VIRTUAL VISIT (OUTPATIENT)
Dept: RHEUMATOLOGY | Facility: CLINIC | Age: 61
End: 2021-03-10
Attending: NURSE PRACTITIONER
Payer: COMMERCIAL

## 2021-03-10 VITALS — BODY MASS INDEX: 27.9 KG/M2 | WEIGHT: 133.5 LBS

## 2021-03-10 DIAGNOSIS — M05.79 RHEUMATOID ARTHRITIS INVOLVING MULTIPLE SITES WITH POSITIVE RHEUMATOID FACTOR (H): ICD-10-CM

## 2021-03-10 DIAGNOSIS — M81.8 STEROID-INDUCED OSTEOPOROSIS: ICD-10-CM

## 2021-03-10 DIAGNOSIS — T38.0X5A STEROID-INDUCED OSTEOPOROSIS: ICD-10-CM

## 2021-03-10 DIAGNOSIS — Z79.52 LONG TERM (CURRENT) USE OF SYSTEMIC STEROIDS: Primary | ICD-10-CM

## 2021-03-10 DIAGNOSIS — Z79.899 LONG-TERM USE OF PLAQUENIL: ICD-10-CM

## 2021-03-10 DIAGNOSIS — I50.22 CHRONIC SYSTOLIC HEART FAILURE (H): ICD-10-CM

## 2021-03-10 PROCEDURE — 99213 OFFICE O/P EST LOW 20 MIN: CPT | Mod: GT | Performed by: NURSE PRACTITIONER

## 2021-03-10 RX ORDER — FUROSEMIDE 20 MG
20 TABLET ORAL DAILY
Qty: 90 TABLET | Refills: 1 | Status: SHIPPED | OUTPATIENT
Start: 2021-03-10 | End: 2021-06-23

## 2021-03-10 RX ORDER — PREDNISONE 5 MG/1
5 TABLET ORAL DAILY
Qty: 90 TABLET | Refills: 3 | Status: SHIPPED | OUTPATIENT
Start: 2021-03-10 | End: 2021-11-11

## 2021-03-10 RX ORDER — HYDROXYCHLOROQUINE SULFATE 200 MG/1
200 TABLET, FILM COATED ORAL DAILY
Qty: 90 TABLET | Refills: 2 | Status: SHIPPED | OUTPATIENT
Start: 2021-03-10 | End: 2022-01-07

## 2021-03-10 ASSESSMENT — PAIN SCALES - GENERAL: PAINLEVEL: MODERATE PAIN (4)

## 2021-03-10 NOTE — LETTER
3/10/2021       RE: Amelia Michel  [address]  [facility] [address]     Dear Colleague,    Thank you for referring your patient, Amelia Michel, to the [facility] [place] at [facility]. Please see a copy of my visit note below.    Amelia is a 60 year old who is being evaluated via a billable telephpne visit.      How would you like to obtain your AVS? Mail a copy  Originating Location (pt. Location): Home    Distant Location (provider location):  [facility] [place]     Telephone call 20 min chart prep and review, disucssion of osteoporosis  [facility]    Amelia Michel  is a 60 year old here for rheumatoid arthritis (RA) dx 33 y/o. +CCP >331 -RF -KALYAN panel. 5-2108 XR 5-2018 DDD and facet osteoarthritis, congential fusion C2-3) prednisone since dx at 33 y/o, hydroxychloroquine (plaquenil) long-time since dx tapered by 200 mg 9-2019. Lymphoma in liver, heart, bone and mass between esophagus, left pelvic, right ankle. Co-manage Dr. Maribell Domínguez      Review-Dr. Dumas in [facility] for many years. She had relatively inactive disease prior to her arrest but was on methotrexate 10 mg PO per week, Arava 10 mg daily, Plaquenil 200 mg twice per day, and prednisone 3 mg daily. She has had partial fusion of her right wrist. After discharge from the hospital she was put on prednisone 5 mg daily as monotherapy. Sulfasalazine -not effective long-time, initial alan but resolved after retried. Past Dr. Cunha and Dr. 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 Treated concervatively with plaquenil and 5mg prednisone, and not interested in rituximab or other biologics. She has previously been treated with  sulfasalazine and initially developed a rash which was initially thought to be a sulfa rash, but her rash resolved and did not reoccur after being placed back on sulfasalazine. She was also treated with Arava, but this discontinued during her cancer treatment. She feels that these drugs have had no effect on her symptoms. Etanercept (enbrel) in past no benefits.       Copy forward  Dr. Pritchett 2-2019:      Interval history 3/30/18: On 3/30/18, her rheumatoid arthritis appeared to be under good control. Her plan at this time was to continue prednisone 5mg daily and HCQ 200mg twice a day. She was told to have an eye exam and follow up in 3 months. It was recommended that she get a DEXA scan to evaluate whether bisphosphonate was indicated (had 7-8 years of Fosamax in early 2000's). Previous use of methotrexate has been linked to her development of a severe neuropathy in the digits of her upper and lower extremities. She has also previously used arava.    Interval history 5/31/18: The patient reports spraining her ankle prior to her cardiac arrest, which caused some neuropathy in her sprained foot. Therefore she is currently wearing an ankle brace and using a walker. However, her arthritis is doing fine she reports that the colchicine she was recently prescribed has been very effective. She mentions that she is switching insurance companies and had an unexpected arm surgery, which has prevented her from getting an eye exam that she is aware of being overdue for. She was also laid off recently but was able to fill the majority of her medications and made the most of her previous insurance so she should be able to manage for a while. She has undergone multiple CT scans and is currently finished with chemotherapy. Lymphoma is in remission and her heart is doing fine. She is interested in the options she has for medications that manage her rheumatoid arthritis and her lymphoma. She has taken many medications including  "plaquenil, prednisone, arava, methotrexate, orencia, and rituximab (for lymphoma), and has plenty of options to choose from if she wants to lower the cost of prescriptions or avoid unwanted side-effects. The plan at this time was to continue prednisone 5mg daily.     2-2019: Today, Amelia reports that she has had increased pain in her right shoulder, primarily losing rande of motion to extension. Notes that this has gotten worse after colchicine (for pericarditis) was stopped. She currently wears a brace on her feet to treat neuropathy, and uses a walker. She notes that these braces alter her gait, and as a result, has had some knee and low back pain, worse towards the end of the day.    Initial visit September 11, 2019  Frederic Gongora APRN:   Chronic low back pain into right knee, more this past month lumbar then right sided \"tennis ball\". Not evaluated over the time. Right foot neuropathy, with brace and some in left foot, left hand neuropathy chronic \"amiodorone infiltrated\". Chronic right hand arthralgia.     Gait impaired, walker for fear of falling since cardiac arrest 5/2017 lymphoma in heart led of afib long-term rehab, refractory afib before that. S/p cardioversion. Bone marrow bx 7/2017. Treatment July 2017 to 11/29/2017 (IV and intrathecal) think neuropathy came from, cardiomegaly from this and heart failure. EF 60-65% 9-2017, some effusion. Colchicine during this one a day and 'arthritis did great with that\". Never gone down on hydroxychloroquine (plaquenil)     Weather more arthralgia. Prednisone 5 mg once day, hydroxychloroquine (plaquenil) 200 mg BID , gabapentin 200 Mg TID very effective no side-effects, no vision issues. Caltrate BID, fosamax 70 mg every 7 day, right foot to 2\" strip to outside leg chronic stops at hip (one day had sattel anesthesia-image negative, did see neurology, who started gabapentin 7-2016), bilateral arches feet, left hand neuropathy is chronic left 1-3 fingers no feeling or " "the back side arm to elbow. No vision issues. No falls or injuries. No neck issues.       Copy forward  March 11, 2020  PPM 12-13 new this is helping, lower energy was put in for bradycardia, felt like was when had rapid afib.  Trouble with hands, hard to use like using for cleaning. 1-3 fingers affected. Right ankle sore. Pang are the best time of year for her. Summer and warmer weather is harder. Right wrist fused. Denies any fever, chills, SOB, MONTALVO, night sweats, or chest pain, high fever, cough, travel in last 14 days or exposure to covid-19 (coronavirus). Reports healthy.chronic numb from prior lymphoma left outer arm, left bicep, left 4-5.       March 10, 2021  Denies any fever, chills, SOB, MONTALVO, night sweats, or chest pain, high fever, cough, travel in last 14 days or exposure to covid-19 (coronavirus). Reports healthy. Follows CDC guidelines. Sinus issues for a week other then that fine. Not out tho either. Will be getting vaccine signing in now.     Arthritis is hand bothering her. Left worse, left hand neuropathy from antecubital IV reaction \"take out muscle and fat\". Its her thumb gets her issues. Hurts all the time.  Not swelling. Base of thumb some tingling. The same. With her history cardiac arrest and chemo does have splint, does not need OT. Walking now and mentally doing good.      Right ankle triple arthrodesis Oct 2020, learning how to walk properly. \"much better\".  Doing more more active.     Fosamax 70 mg every 7 days restarted 9-2020 per Dr. Maribell Domínguez, was on with PCP  In past \"not concerned on for too long\" will need future time off of this was off for 18 month. Prednisone 5 mg once day chronically, hydroxychloroquine (plaquenil) 200 mg once day -eye exam in Dec 2020 early catarct          PMH:  Injury-left thumb amputation, left finger broke  Medical-  Antiplatelet or antithrombotic long-term use  Arrhythmia  Atrial tachycardia, flutter  Paroxysmal atrial " "fibrillation  Arthritis  Lymphoma  Cardiac arrest  history of chemotherapy  Primary pulmonary hypertension  Neuropathy  Postoperative nausea and vomiting  Rheumatoid arthritis  Hyperlipidemia LDL goal <130  Lymphedema of both lower extremities  Cardiogenic shock and cardiac arrest 5/2017  Acute respiratory failure with hypoxia  Critical illness myopathy  Sepsis  Wound of left upper extremity  Diffuse large B-cell lymphoma of extranodal site  Febrile neutropenia  Physical deconditioning  Palpitations  Osteoporosis  -DEXA 2018   Slowly healing wound in medial left antecubital fossa after traumatic IV  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, latter felt from lymphoma   Neuropathy of right foot with weakness after intrathecal cytarabine  perioditis   Right arm PICC blood clots during cancer treatments  Right shoulder effusion when had pericarditis       Past Surgical History:  Anesthesia cardioversion  Right wrist arthrodesis, partial fusion \"history migrated out\"  Bone marrow aspirate & biopsy  Excise lesion upper extremity - left wound excision and closure, possible submuscular transposition of ulnar nerve  Foot surgery - 4 left, 2 right  Carpal tunnel bilateral release  Repair ventricular septal defect  Transpositional ulnar nerve (elbow) left   Right shoulder effusion history     Family History:   No autoimmune disorders, psoriasis, UC, crohn's, SLE, RA, PsA, gout, autoimmune thyroid.  No MS, heart disease in family  Mother - breast cancer, hypertension, alzheimer disease-passed   Father - hypertension, lymphoma, circulatory A fib-passed  Paternal grandmother - diabetes  Siblings-younger sister heatlhy PB, younger brother think arthritis not dx PB     Social History:   No smoking or tobacco use. No alcohol use. No IVDU. None Children. Right handed. . Disability      PMSH personally reviewed and updated by me.    ROS:  Negative raynaud s phenomena, hairloss, sun sensitivities, " pleurisy, inflammatory joint symptoms, significant rashes like malar, oral/nasal or ulcerations, inflammatory eye disease, inflammatory bowel disease, dactylitis, tenosynovitis, or enthespathy. Negative for miscarriages over 2 months into pregnancy, gout, psoriasis, UC, crohn's. No temporal headache, no jaw claudication, no scalp tenderness, vision changes, carotidynia, cough. No STD. No Parotid swelling. Denies international travel. Denies history of pneumonia, hepatitis, tuberculosis, shingles, tick bites or other infections.     CONSTITUTIONAL: No fevers, night sweats or unintentional weight change. No acute distress, swollen glands  EYES: No vision change, diplopia, pain in eyes or red eyes   EARS, NOSE, MOUTH, THROAT: No tinnitus or hearing change, no epistaxis or nasal discharge, no oral lesions, throat clear. Normal saliva pool.  No drymouth. No thyroid enlargement.   CARDIOVASCULAR: No chest pain, palpitations, or pain with walking, no orthopnea or PND   RESPIRATORY: No dyspnea, cough, shortness of breath or wheezing. No pleurisy.   GI: No nausea, vomiting, diarrhea or constipation, no abdominal pain, or blood in stools.   : No change in urine, no dysuria or hematuria   MUSCKL: No swollen, tender, red or painful joints. No nodules. No enthesitis, plantar fascitis or heel pain.   INTEGUMENTARY: No concerning lesions or moles   NEURO: No loss of strength or sensation, no numbness or tingling, no tremor, no dizziness, no headache. No falls   ENDO: No polyuria or polydipsia, no temperature intolerance   HEME/LYMPH:No concerning bumps, bleeding problems, or swollen lymph nodes. No recent infections, hospitalizations or new illnesses.   PSYCH:No depression or anxiety, no sleep problems.  Otherwise 14 point ROS obtained, reviewed and found negative.     Telephone Assessment Objective:   PSYCH: Alert and oriented times 3; coherent speech, normal  rate and volume, able to articulate logical thoughts, able   to  abstract reason, no tangential thoughts, no hallucinations or delusions  His affect is normal and pleasant  RESP: No cough, no audible wheezing, able to talk in full sentences  Neuro: Cranial nerves grossly intact.  Mentation and speech appropriate for age.  Remainder of exam unable to be completed due to telephone visits    Due to efforts to reduce the spread of COVID-19 in the clinic, state, nation, telephone visits are encouraged currently. Patient understands that diagnose and advice is limited by the inability to exam him/her/them face-to-face. Discussed corovid-19 prevention, CDC guidelines/resources and to follow CDC/MDH guidelines. If any signs or symptoms of infection to stop immunosuppression and seek immediate medical attn. That prednisone can increase change of serious infections so should be avoided. Discussed the importance of good hand washing techniques with soap and water for at least 20 seconds, avoid touching eyes, nose, mouth, avoiding crowds, social distancing. We also agree that the benefits of continued immunosuppression outweigh the risks of COVID-19 infection. Not able to connect with [employer] or "[employer]" and she wishes to delay in person visit, wanted to continue with phone visit     Labs/Imaging:  Eye Exam (10/23/18)  Significant for mild H-lated nuclear cataracts.   Ocular CT was recommended for right eye.     CT Chest/Abdomen/Pelvis (9/26/18)  In this patient with a history of lymphoma:  1. No evidence of disease recurrence, consistent with complete  response per Lugano criteria.  2. Stable cardiomegaly. Improved pericardial effusion.  3. Early contrast phase with heterogeneous visceral enhancement in the  abdomen, likely secondary to cardiac dysfunction.    MRI Cardiac w/contrast (4/12/18)  Loculated exudative pericardial effusion that is beginning to organize, with diffuse  pericardial enhancement consistent with ongoing inflammation. Normal LV fxn, LVEF 54% and RVEF 49%. At  least  moderate, possibly severe tricuspid regurgitation. Compared to MRI from 03/2018, effusion is largely  unchanged in size (maybe a bit smaller) but is starting to organize. No change in pericardial enhancement.    Laboratory:   No results found for this or any previous visit (from the past 48 hour(s)).  Component      Latest Ref Rng & Units 9/17/2020 10/14/2020   WBC      4.0 - 11.0 10e9/L 6.5 5.5   RBC Count      3.8 - 5.2 10e12/L 3.98 4.30   Hemoglobin      11.7 - 15.7 g/dL 14.2 15.5   Hematocrit      35.0 - 47.0 % 42.0 46.4   MCV      78 - 100 fl 106 (H) 108 (H)   MCH      26.5 - 33.0 pg 35.7 (H) 36.0 (H)   MCHC      31.5 - 36.5 g/dL 33.8 33.4   RDW      10.0 - 15.0 % 13.0 13.0   Platelet Count      150 - 450 10e9/L 152 154   Diff Method       Automated Method    % Neutrophils      % 81.3    % Lymphocytes      % 10.3    % Monocytes      % 7.1    % Eosinophils      % 0.5    % Basophils      % 0.5    % Immature Granulocytes      % 0.3    Nucleated RBCs      0 /100 0    Absolute Neutrophil      1.6 - 8.3 10e9/L 5.3    Absolute Lymphocytes      0.8 - 5.3 10e9/L 0.7 (L)    Absolute Monocytes      0.0 - 1.3 10e9/L 0.5    Absolute Eosinophils      0.0 - 0.7 10e9/L 0.0    Absolute Basophils      0.0 - 0.2 10e9/L 0.0    Abs Immature Granulocytes      0 - 0.4 10e9/L 0.0    Absolute Nucleated RBC       0.0    Sodium      133 - 144 mmol/L 139 138   Potassium      3.4 - 5.3 mmol/L 3.9 3.7   Chloride      94 - 109 mmol/L 104 104   Carbon Dioxide      20 - 32 mmol/L 28 33 (H)   Anion Gap      3 - 14 mmol/L 7 1 (L)   Glucose      70 - 99 mg/dL 127 (H) 101 (H)   Urea Nitrogen      7 - 30 mg/dL 19 20   Creatinine      0.52 - 1.04 mg/dL 0.88 0.99   GFR Estimate      >60 mL/min/1.73:m2 72 62   GFR Estimate If Black      >60 mL/min/1.73:m2 83 72   Calcium      8.5 - 10.1 mg/dL 9.8 9.7   Bilirubin Total      0.2 - 1.3 mg/dL 0.6    Albumin      3.4 - 5.0 g/dL 3.8    Protein Total      6.8 - 8.8 g/dL 7.8    Alkaline Phosphatase      40 -  150 U/L 83    ALT      0 - 50 U/L 40    AST      0 - 45 U/L 30    Cholesterol      <200 mg/dL  129   Triglycerides      <150 mg/dL  133   HDL Cholesterol      >49 mg/dL  48 (L)   LDL Cholesterol Calculated      <100 mg/dL  54   Non HDL Cholesterol      <130 mg/dL  81   Sed Rate      0 - 30 mm/h 16    CRP Inflammation      0.0 - 8.0 mg/L 20.5 (H)    IGG      610 - 1,616 mg/dL 1,490        Impression/Plan:  1. Seropositive rheumatoid arthritis (, RF 31) with multiple joint disability including MTP fusion 1-5 bilaterally, partial right wrist fusion, subluxation and ulnar deformity of MCP's. Rheumatoid arthritis (RA) controlled.  Will continue prednisone, and hydroxychloroquine (plaquenil) 200 mg once a day. Prn joint injection or a short burst of prednisone for symptom relief (per her preference).    Discussion of options with her leflunomide (arava) --had  Liver involvment with #3, sulfasalazine re-trial, adalimumab (humira) but higher risk of cancer. She will discuss with oncology and think about this. She is not interested in using an alternative (leflunomide or rituximab) for RA.  Advise cervical x-rays prior to any surgeries, does have DDD (no symptoms at this time). Poor prognosis, discussed risk extraarticular and deformities risks and of AA instability, she understands.    2. Long-term hydroxychloroquine (plaquenil) use since 6-2017,  no toxicity, reviewed AAO guidelines, repeat every year. 200 mg once a day     3. Diffuse large B-cell lymphoma status-post 1 cycle R-EPOCH, 6 cycles R-CHOP; Neuropathy of lower extremities attributed to lymphoma improved on gabapentin 200 mg TID-tolerating. Loculated pericardial effusion, etiology unclear (lymphoma). No symptoms now.     per oncology for surveillance Seeing next week    4. Osteoporosis, chronic long-term corticosteroid use with cushingoid appearance. Received 6-7 years of alendronate in early 2000s. Restarted alendronate in 1/2019, off 3-2020 to  9-2020 then restarted Dr. Maribell Domínguez  9-2020. DEXA  T-2.6 thoracic. If > 5 yr higher risk atypical infections--schedule follow-up DEXA. Calcium and GFR normal  -will order future vitamin D     RTC 6 month  90 day prescriptions per insurance   Will be getting Covid-19 (coronavirus) vaccine, then after 30 days consider repeat pneumoni 23 vaccine.     The patient understood the rationale for the diagnosis and treatment plan. Patient shared in the decision making. All questions were answered to best of my ability and the patient's satisfaction and agrees with the plan.     Frederic Gongora APRN, CNP, MSN  [facility]  Department of Rheumatology & Autoimmune Disorders

## 2021-03-10 NOTE — PATIENT INSTRUCTIONS
"Advise repeat pnuemonia 23 vaccine but wait at least 30 day after the last Covid-19 (coronavirus) vaccine    Schedule SAMARA Jaffe [phone]  Go to [employer] Website:  [email or web address]     Keep your [employer] information up-to-date for e-mail and phone contacts about Covid-19 (coronavirus) vaccines availability. This is how you will be notified when you qualify for the vaccine. \"Visit your [employer] account to update your communications preferences and opt-in for e-mail and text message communication from us. Emails and text messages are the best ways for us to communicate with you, your friends, and your family about the upcoming availability of a COVID-19 vaccine. In the coming weeks, we will be vaccinating other age groups, in accordance with guidance from state and federal partners. We will be notifying our patients of changes in priority via email and text messages.\"     It is recommended you get vaccinated with the COVID-19 vaccine when available to help protect you against the Covid-19 (coronavirus) complications. Although I would try to hold immunosuppression when it is reasonable and straightforward to do so, getting it as quickly as possible is the most important thing, particularly as this new, more highly transmissible strain takes hold. I recommend vaccination for virtually all patients, particularly so for RA patients who are in good control. Because of the unpredictability of vaccination, I don't recommend holding immunosuppression. I don't agree that holding it for short periods makes any difference anyway.      The Centers for Disease Control (CDC) state persons with immunocompromising conditions or who take immunosuppressive medications might be at increased risk for severe COVID-19. Immunocompromised individuals may still receive COVID-19 vaccination if they have no contraindications to vaccination and those with autoimmune conditions may receive an " COVID-19 vaccine.     There are no data about use of COVID-19 vaccines in patients with pregnancy, autoimmune disorders or immunosuppression. Theoretical risk of autoimmune disease flare up or reduced effectiveness in immunocompromised populations exists.  We do not know how the covid vaccines will be distributed yet for general population. Once the vaccination is available, you may reach out to us so that we can advise on any recommended changes in your medications to optimize your vaccine response based on the [facility], [place] Department of Health and American College of Rheumatology.     In the early stages of the global vaccination program, we advise you to continue to follow current public health guidelines designed to prevent spread of COVID-19, EVEN IF you get the vaccine. The vaccine is reportedly 90-94% effective depending on which one you get, but it is not yet known whether vaccination absolutely prevents ability for recipients to contract and spread the coronavirus especially those on immunosuppression medications. Remember, it takes 10-14 days after a vaccine to develop an immune response from the vaccine. So, this takes time to provide added protection and you will be a higher risk before and those days after the vaccine.     Other resources to look for the Covid-19 (coronavirus) vaccine--this changes frequently  [facility] Vaccine Scheduling: https://[place].gov/covid19/vaccine/find-vaccine/index.jsp   Schedule by phone at: toll free at [phone]    Here are some helpful links:  American College of Rheumatology    CDC links:  https://www.cdc.gov/coronavirus/2019-ncov/vaccines/different-vaccines/var-ggot-oimo.html    https://www.cdc.gov/coronavirus/2019-ncov/vaccines/index.html     This is CDC link for immunocompromised patients:  https://www.cdc.gov/coronavirus/2019-ncov/need-extra-precautions/immunocompromised.html    [email or web address]      [email or web address]

## 2021-03-10 NOTE — PROGRESS NOTES
Amelia is a 60 year old who is being evaluated via a billable telephpne visit.      How would you like to obtain your AVS? Mail a copy  Originating Location (pt. Location): Home    Distant Location (provider location):  [facility] [place]     Telephone call 20 min chart prep and review, disucssion of osteoporosis  And and treatment optjons

## 2021-03-10 NOTE — PROGRESS NOTES
[facility] - Rheumatology Clinic Visit    Amelia Michel  is a 60 year old here for rheumatoid arthritis (RA) dx 35 y/o. +CCP >331 -RF -KALYAN panel. 5-2108 XR 5-2018 DDD and facet osteoarthritis, congential fusion C2-3) prednisone since dx at 35 y/o, hydroxychloroquine (plaquenil) long-time since dx tapered by 200 mg 9-2019. Lymphoma in liver, heart, bone and mass between esophagus, left pelvic, right ankle. Co-manage Dr. Maribell Domínguez      Review-Dr. Dumas in [facility] for many years. She had relatively inactive disease prior to her arrest but was on methotrexate 10 mg PO per week, Arava 10 mg daily, Plaquenil 200 mg twice per day, and prednisone 3 mg daily. She has had partial fusion of her right wrist. After discharge from the hospital she was put on prednisone 5 mg daily as monotherapy. Sulfasalazine -not effective long-time, initial alan but resolved after retried. Past Dr. Cunha and Dr. 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 Treated concervatively with plaquenil and 5mg prednisone, and not interested in rituximab or other biologics. She has previously been treated with sulfasalazine and initially developed a rash which was initially thought to be a sulfa rash, but her rash resolved and did not reoccur after being placed back on sulfasalazine. She was also treated with Arava, but this discontinued during her cancer treatment. She feels that these drugs have had no effect on her symptoms. Etanercept (enbrel) in past no benefits.       Copy forward  Dr. Pritchett 2-2019:      Interval history 3/30/18: On 3/30/18, her rheumatoid arthritis appeared to be under good control. Her plan at this time was to continue prednisone 5mg daily and HCQ 200mg twice a day. She was told to have an eye exam and follow up in 3 months. It was recommended that she get a DEXA scan to evaluate  whether bisphosphonate was indicated (had 7-8 years of Fosamax in early 2000's). Previous use of methotrexate has been linked to her development of a severe neuropathy in the digits of her upper and lower extremities. She has also previously used arava.    Interval history 5/31/18: The patient reports spraining her ankle prior to her cardiac arrest, which caused some neuropathy in her sprained foot. Therefore she is currently wearing an ankle brace and using a walker. However, her arthritis is doing fine she reports that the colchicine she was recently prescribed has been very effective. She mentions that she is switching insurance companies and had an unexpected arm surgery, which has prevented her from getting an eye exam that she is aware of being overdue for. She was also laid off recently but was able to fill the majority of her medications and made the most of her previous insurance so she should be able to manage for a while. She has undergone multiple CT scans and is currently finished with chemotherapy. Lymphoma is in remission and her heart is doing fine. She is interested in the options she has for medications that manage her rheumatoid arthritis and her lymphoma. She has taken many medications including plaquenil, prednisone, arava, methotrexate, orencia, and rituximab (for lymphoma), and has plenty of options to choose from if she wants to lower the cost of prescriptions or avoid unwanted side-effects. The plan at this time was to continue prednisone 5mg daily.     2-2019: Today, Amelia reports that she has had increased pain in her right shoulder, primarily losing rande of motion to extension. Notes that this has gotten worse after colchicine (for pericarditis) was stopped. She currently wears a brace on her feet to treat neuropathy, and uses a walker. She notes that these braces alter her gait, and as a result, has had some knee and low back pain, worse towards the end of the day.    Initial visit  "September 11, 2019  Frederic Fransisca APRN:   Chronic low back pain into right knee, more this past month lumbar then right sided \"tennis ball\". Not evaluated over the time. Right foot neuropathy, with brace and some in left foot, left hand neuropathy chronic \"amiodorone infiltrated\". Chronic right hand arthralgia.     Gait impaired, walker for fear of falling since cardiac arrest 5/2017 lymphoma in heart led of afib long-term rehab, refractory afib before that. S/p cardioversion. Bone marrow bx 7/2017. Treatment July 2017 to 11/29/2017 (IV and intrathecal) think neuropathy came from, cardiomegaly from this and heart failure. EF 60-65% 9-2017, some effusion. Colchicine during this one a day and 'arthritis did great with that\". Never gone down on hydroxychloroquine (plaquenil)     Weather more arthralgia. Prednisone 5 mg once day, hydroxychloroquine (plaquenil) 200 mg BID , gabapentin 200 Mg TID very effective no side-effects, no vision issues. Caltrate BID, fosamax 70 mg every 7 day, right foot to 2\" strip to outside leg chronic stops at hip (one day had sattel anesthesia-image negative, did see neurology, who started gabapentin 7-2016), bilateral arches feet, left hand neuropathy is chronic left 1-3 fingers no feeling or the back side arm to elbow. No vision issues. No falls or injuries. No neck issues.       Copy forward  March 11, 2020  PPM 12-13 new this is helping, lower energy was put in for bradycardia, felt like was when had rapid afib.  Trouble with hands, hard to use like using for cleaning. 1-3 fingers affected. Right ankle sore. Pang are the best time of year for her. Summer and warmer weather is harder. Right wrist fused. Denies any fever, chills, SOB, MONTALVO, night sweats, or chest pain, high fever, cough, travel in last 14 days or exposure to covid-19 (coronavirus). Reports healthy.chronic numb from prior lymphoma left outer arm, left bicep, left 4-5.       March 10, 2021  Denies any fever, chills, SOB, " "MONTALVO, night sweats, or chest pain, high fever, cough, travel in last 14 days or exposure to covid-19 (coronavirus). Reports healthy. Follows CDC guidelines. Sinus issues for a week other then that fine. Not out tho either. Will be getting vaccine signing in now.     Arthritis is hand bothering her. Left worse, left hand neuropathy from antecubital IV reaction \"take out muscle and fat\". Its her thumb gets her issues. Hurts all the time.  Not swelling. Base of thumb some tingling. The same. With her history cardiac arrest and chemo does have splint, does not need OT. Walking now and mentally doing good.      Right ankle triple arthrodesis Oct 2020, learning how to walk properly. \"much better\".  Doing more more active.     Fosamax 70 mg every 7 days restarted 9-2020 per Dr. Maribell Domínguez, was on with PCP  In past \"not concerned on for too long\" will need future time off of this was off for 18 month. Prednisone 5 mg once day chronically, hydroxychloroquine (plaquenil) 200 mg once day -eye exam in Dec 2020 early catarct          PMH:  Injury-left thumb amputation, left finger broke  Medical-  Antiplatelet or antithrombotic long-term use  Arrhythmia  Atrial tachycardia, flutter  Paroxysmal atrial fibrillation  Arthritis  Lymphoma  Cardiac arrest  history of chemotherapy  Primary pulmonary hypertension  Neuropathy  Postoperative nausea and vomiting  Rheumatoid arthritis  Hyperlipidemia LDL goal <130  Lymphedema of both lower extremities  Cardiogenic shock and cardiac arrest 5/2017  Acute respiratory failure with hypoxia  Critical illness myopathy  Sepsis  Wound of left upper extremity  Diffuse large B-cell lymphoma of extranodal site  Febrile neutropenia  Physical deconditioning  Palpitations  Osteoporosis  -DEXA 2018   Slowly healing wound in medial left antecubital fossa after traumatic IV  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, latter felt from lymphoma   Neuropathy of right foot with " "weakness after intrathecal cytarabine  perioditis   Right arm PICC blood clots during cancer treatments  Right shoulder effusion when had pericarditis       Past Surgical History:  Anesthesia cardioversion  Right wrist arthrodesis, partial fusion \"history migrated out\"  Bone marrow aspirate & biopsy  Excise lesion upper extremity - left wound excision and closure, possible submuscular transposition of ulnar nerve  Foot surgery - 4 left, 2 right  Carpal tunnel bilateral release  Repair ventricular septal defect  Transpositional ulnar nerve (elbow) left   Right shoulder effusion history     Family History:   No autoimmune disorders, psoriasis, UC, crohn's, SLE, RA, PsA, gout, autoimmune thyroid.  No MS, heart disease in family  Mother - breast cancer, hypertension, alzheimer disease-passed   Father - hypertension, lymphoma, circulatory A fib-passed  Paternal grandmother - diabetes  Siblings-younger sister heatlhy PB, younger brother think arthritis not dx PB     Social History:   No smoking or tobacco use. No alcohol use. No IVDU. None Children. Right handed. . Disability      PMSH personally reviewed and updated by me.    ROS:  Negative raynaud s phenomena, hairloss, sun sensitivities, pleurisy, inflammatory joint symptoms, significant rashes like malar, oral/nasal or ulcerations, inflammatory eye disease, inflammatory bowel disease, dactylitis, tenosynovitis, or enthespathy. Negative for miscarriages over 2 months into pregnancy, gout, psoriasis, UC, crohn's. No temporal headache, no jaw claudication, no scalp tenderness, vision changes, carotidynia, cough. No STD. No Parotid swelling. Denies international travel. Denies history of pneumonia, hepatitis, tuberculosis, shingles, tick bites or other infections.     CONSTITUTIONAL: No fevers, night sweats or unintentional weight change. No acute distress, swollen glands  EYES: No vision change, diplopia, pain in eyes or red eyes   EARS, NOSE, MOUTH, THROAT: No " tinnitus or hearing change, no epistaxis or nasal discharge, no oral lesions, throat clear. Normal saliva pool.  No drymouth. No thyroid enlargement.   CARDIOVASCULAR: No chest pain, palpitations, or pain with walking, no orthopnea or PND   RESPIRATORY: No dyspnea, cough, shortness of breath or wheezing. No pleurisy.   GI: No nausea, vomiting, diarrhea or constipation, no abdominal pain, or blood in stools.   : No change in urine, no dysuria or hematuria   MUSCKL: No swollen, tender, red or painful joints. No nodules. No enthesitis, plantar fascitis or heel pain.   INTEGUMENTARY: No concerning lesions or moles   NEURO: No loss of strength or sensation, no numbness or tingling, no tremor, no dizziness, no headache. No falls   ENDO: No polyuria or polydipsia, no temperature intolerance   HEME/LYMPH:No concerning bumps, bleeding problems, or swollen lymph nodes. No recent infections, hospitalizations or new illnesses.   PSYCH:No depression or anxiety, no sleep problems.  Otherwise 14 point ROS obtained, reviewed and found negative.     Telephone Assessment Objective:   PSYCH: Alert and oriented times 3; coherent speech, normal  rate and volume, able to articulate logical thoughts, able   to abstract reason, no tangential thoughts, no hallucinations or delusions  His affect is normal and pleasant  RESP: No cough, no audible wheezing, able to talk in full sentences  Neuro: Cranial nerves grossly intact.  Mentation and speech appropriate for age.  Remainder of exam unable to be completed due to telephone visits    Due to efforts to reduce the spread of COVID-19 in the clinic, state, nation, telephone visits are encouraged currently. Patient understands that diagnose and advice is limited by the inability to exam him/her/them face-to-face. Discussed corovid-19 prevention, CDC guidelines/resources and to follow CDC/MDH guidelines. If any signs or symptoms of infection to stop immunosuppression and seek immediate medical  attn. That prednisone can increase change of serious infections so should be avoided. Discussed the importance of good hand washing techniques with soap and water for at least 20 seconds, avoid touching eyes, nose, mouth, avoiding crowds, social distancing. We also agree that the benefits of continued immunosuppression outweigh the risks of COVID-19 infection. Not able to connect with [facility] or [employer] and she wishes to delay in person visit, wanted to continue with phone visit     Labs/Imaging:  Eye Exam (10/23/18)  Significant for mild H-lated nuclear cataracts.   Ocular CT was recommended for right eye.     CT Chest/Abdomen/Pelvis (9/26/18)  In this patient with a history of lymphoma:  1. No evidence of disease recurrence, consistent with complete  response per Lugano criteria.  2. Stable cardiomegaly. Improved pericardial effusion.  3. Early contrast phase with heterogeneous visceral enhancement in the  abdomen, likely secondary to cardiac dysfunction.    MRI Cardiac w/contrast (4/12/18)  Loculated exudative pericardial effusion that is beginning to organize, with diffuse  pericardial enhancement consistent with ongoing inflammation. Normal LV fxn, LVEF 54% and RVEF 49%. At  least moderate, possibly severe tricuspid regurgitation. Compared to MRI from 03/2018, effusion is largely  unchanged in size (maybe a bit smaller) but is starting to organize. No change in pericardial enhancement.    Laboratory:   No results found for this or any previous visit (from the past 48 hour(s)).  Component      Latest Ref Rng & Units 9/17/2020 10/14/2020   WBC      4.0 - 11.0 10e9/L 6.5 5.5   RBC Count      3.8 - 5.2 10e12/L 3.98 4.30   Hemoglobin      11.7 - 15.7 g/dL 14.2 15.5   Hematocrit      35.0 - 47.0 % 42.0 46.4   MCV      78 - 100 fl 106 (H) 108 (H)   MCH      26.5 - 33.0 pg 35.7 (H) 36.0 (H)   MCHC      31.5 - 36.5 g/dL 33.8 33.4   RDW      10.0 - 15.0 % 13.0 13.0   Platelet Count      150 - 450 10e9/L 152 154   Diff  Method       Automated Method    % Neutrophils      % 81.3    % Lymphocytes      % 10.3    % Monocytes      % 7.1    % Eosinophils      % 0.5    % Basophils      % 0.5    % Immature Granulocytes      % 0.3    Nucleated RBCs      0 /100 0    Absolute Neutrophil      1.6 - 8.3 10e9/L 5.3    Absolute Lymphocytes      0.8 - 5.3 10e9/L 0.7 (L)    Absolute Monocytes      0.0 - 1.3 10e9/L 0.5    Absolute Eosinophils      0.0 - 0.7 10e9/L 0.0    Absolute Basophils      0.0 - 0.2 10e9/L 0.0    Abs Immature Granulocytes      0 - 0.4 10e9/L 0.0    Absolute Nucleated RBC       0.0    Sodium      133 - 144 mmol/L 139 138   Potassium      3.4 - 5.3 mmol/L 3.9 3.7   Chloride      94 - 109 mmol/L 104 104   Carbon Dioxide      20 - 32 mmol/L 28 33 (H)   Anion Gap      3 - 14 mmol/L 7 1 (L)   Glucose      70 - 99 mg/dL 127 (H) 101 (H)   Urea Nitrogen      7 - 30 mg/dL 19 20   Creatinine      0.52 - 1.04 mg/dL 0.88 0.99   GFR Estimate      >60 mL/min/1.73:m2 72 62   GFR Estimate If Black      >60 mL/min/1.73:m2 83 72   Calcium      8.5 - 10.1 mg/dL 9.8 9.7   Bilirubin Total      0.2 - 1.3 mg/dL 0.6    Albumin      3.4 - 5.0 g/dL 3.8    Protein Total      6.8 - 8.8 g/dL 7.8    Alkaline Phosphatase      40 - 150 U/L 83    ALT      0 - 50 U/L 40    AST      0 - 45 U/L 30    Cholesterol      <200 mg/dL  129   Triglycerides      <150 mg/dL  133   HDL Cholesterol      >49 mg/dL  48 (L)   LDL Cholesterol Calculated      <100 mg/dL  54   Non HDL Cholesterol      <130 mg/dL  81   Sed Rate      0 - 30 mm/h 16    CRP Inflammation      0.0 - 8.0 mg/L 20.5 (H)    IGG      610 - 1,616 mg/dL 1,490        Impression/Plan:  1. Seropositive rheumatoid arthritis (, RF 31) with multiple joint disability including MTP fusion 1-5 bilaterally, partial right wrist fusion, subluxation and ulnar deformity of MCP's. Rheumatoid arthritis (RA) controlled.  Will continue prednisone, and hydroxychloroquine (plaquenil) 200 mg once a day. Prn joint injection  or a short burst of prednisone for symptom relief (per her preference).    Discussion of options with her leflunomide (arava) --had  Liver involvment with #3, sulfasalazine re-trial, adalimumab (humira) but higher risk of cancer. She will discuss with oncology and think about this. She is not interested in using an alternative (leflunomide or rituximab) for RA.  Advise cervical x-rays prior to any surgeries, does have DDD (no symptoms at this time). Poor prognosis, discussed risk extraarticular and deformities risks and of AA instability, she understands.    2. Long-term hydroxychloroquine (plaquenil) use since 6-2017,  no toxicity, reviewed AAO guidelines, repeat every year. 200 mg once a day     3. Diffuse large B-cell lymphoma status-post 1 cycle R-EPOCH, 6 cycles R-CHOP; Neuropathy of lower extremities attributed to lymphoma improved on gabapentin 200 mg TID-tolerating. Loculated pericardial effusion, etiology unclear (lymphoma). No symptoms now.     per oncology for surveillance Seeing next week    4. Osteoporosis, chronic long-term corticosteroid use with cushingoid appearance. Received 6-7 years of alendronate in early 2000s. Restarted alendronate in 1/2019, off 3-2020 to 9-2020 then restarted Dr. Maribell Domínguez  9-2020. DEXA  T-2.6 thoracic. If > 5 yr higher risk atypical infections--schedule follow-up DEXA. Calcium and GFR normal  -will order future vitamin D     RTC 6 month  90 day prescriptions per insurance   Will be getting Covid-19 (coronavirus) vaccine, then after 30 days consider repeat pneumoni 23 vaccine.     The patient understood the rationale for the diagnosis and treatment plan. Patient shared in the decision making. All questions were answered to best of my ability and the patient's satisfaction and agrees with the plan.     Frederic Gongora APRN, CNP, MSN  [facility]  Department of Rheumatology & Autoimmune Disorders

## 2021-03-14 NOTE — TELEPHONE ENCOUNTER
Appointment scheduled..Kellee Taylor    
Form completed and faxed to [employer].    Original to patient.  Copy to scanning / copy to drawer..Kellee Taylor    
I agree, virtual visit to discuss forms and some of the questions on it.  Gala Stringer PA-C   
I don't see that Magda has done this for her in the past. If she has not she should make an appointment/ can be video / with her to be seen and review what is going on . Comfort Sabillon M.D.    
Reason for Call:  Form, our goal is to have forms completed with 72 hours, however, some forms may require a visit or additional information.    Type of letter, form or note:  disability - [employer]    Who is the form from?: Patient    Where did the form come from: form was faxed in    What clinic location was the form placed at?: [facility]     Where the form was placed: Given to physician    What number is listed as a contact on the form?:[phone]           Call taken on 8/25/2020 at 11:11 AM by Kellee Taylor        
Will give to AFR to schedule.  Thank you.  Frederic Power RN    
ambulatory

## 2021-03-16 ENCOUNTER — OFFICE VISIT (OUTPATIENT)
Dept: TRANSPLANT | Facility: CLINIC | Age: 61
End: 2021-03-16
Attending: INTERNAL MEDICINE
Payer: COMMERCIAL

## 2021-03-16 ENCOUNTER — MYC MEDICAL ADVICE (OUTPATIENT)
Dept: TRANSPLANT | Facility: CLINIC | Age: 61
End: 2021-03-16

## 2021-03-16 ENCOUNTER — APPOINTMENT (OUTPATIENT)
Dept: LAB | Facility: CLINIC | Age: 61
End: 2021-03-16
Attending: INTERNAL MEDICINE
Payer: COMMERCIAL

## 2021-03-16 ENCOUNTER — RECORDS - HEALTHEAST (OUTPATIENT)
Dept: ADMINISTRATIVE | Facility: OTHER | Age: 61
End: 2021-03-16

## 2021-03-16 VITALS
TEMPERATURE: 97.5 F | RESPIRATION RATE: 18 BRPM | HEART RATE: 129 BPM | DIASTOLIC BLOOD PRESSURE: 95 MMHG | WEIGHT: 136.5 LBS | OXYGEN SATURATION: 98 % | BODY MASS INDEX: 28.53 KG/M2 | SYSTOLIC BLOOD PRESSURE: 128 MMHG

## 2021-03-16 DIAGNOSIS — C83.3A DIFFUSE LARGE CELL LYMPHOMA IN REMISSION: ICD-10-CM

## 2021-03-16 LAB
ALBUMIN SERPL-MCNC: 3.7 G/DL (ref 3.4–5)
ALP SERPL-CCNC: 129 U/L (ref 40–150)
ALT SERPL W P-5'-P-CCNC: 58 U/L (ref 0–50)
ANION GAP SERPL CALCULATED.3IONS-SCNC: 7 MMOL/L (ref 3–14)
AST SERPL W P-5'-P-CCNC: ABNORMAL U/L (ref 0–45)
BILIRUB SERPL-MCNC: 1.4 MG/DL (ref 0.2–1.3)
BUN SERPL-MCNC: 21 MG/DL (ref 7–30)
CALCIUM SERPL-MCNC: 9.3 MG/DL (ref 8.5–10.1)
CHLORIDE SERPL-SCNC: 107 MMOL/L (ref 94–109)
CO2 SERPL-SCNC: 20 MMOL/L (ref 20–32)
CREAT SERPL-MCNC: 0.74 MG/DL (ref 0.52–1.04)
DIFFERENTIAL METHOD BLD: NORMAL
ERYTHROCYTE [DISTWIDTH] IN BLOOD BY AUTOMATED COUNT: NORMAL % (ref 10–15)
GFR SERPL CREATININE-BSD FRML MDRD: 88 ML/MIN/{1.73_M2}
GLUCOSE SERPL-MCNC: 122 MG/DL (ref 70–99)
HCT VFR BLD AUTO: NORMAL % (ref 35–47)
HGB BLD-MCNC: NORMAL G/DL (ref 11.7–15.7)
MCH RBC QN AUTO: NORMAL PG (ref 26.5–33)
MCHC RBC AUTO-ENTMCNC: NORMAL G/DL (ref 31.5–36.5)
MCV RBC AUTO: NORMAL FL (ref 78–100)
PLATELET # BLD AUTO: NORMAL 10E9/L (ref 150–450)
POTASSIUM SERPL-SCNC: 6.3 MMOL/L (ref 3.4–5.3)
PROT SERPL-MCNC: 8 G/DL (ref 6.8–8.8)
RBC # BLD AUTO: NORMAL 10E12/L (ref 3.8–5.2)
SODIUM SERPL-SCNC: 134 MMOL/L (ref 133–144)
WBC # BLD AUTO: NORMAL 10E9/L (ref 4–11)

## 2021-03-16 PROCEDURE — G0463 HOSPITAL OUTPT CLINIC VISIT: HCPCS

## 2021-03-16 PROCEDURE — 82040 ASSAY OF SERUM ALBUMIN: CPT | Performed by: INTERNAL MEDICINE

## 2021-03-16 PROCEDURE — 36415 COLL VENOUS BLD VENIPUNCTURE: CPT

## 2021-03-16 PROCEDURE — 99213 OFFICE O/P EST LOW 20 MIN: CPT | Mod: GC | Performed by: INTERNAL MEDICINE

## 2021-03-16 PROCEDURE — 82247 BILIRUBIN TOTAL: CPT | Performed by: INTERNAL MEDICINE

## 2021-03-16 PROCEDURE — 84155 ASSAY OF PROTEIN SERUM: CPT | Performed by: INTERNAL MEDICINE

## 2021-03-16 PROCEDURE — 84460 ALANINE AMINO (ALT) (SGPT): CPT | Performed by: INTERNAL MEDICINE

## 2021-03-16 PROCEDURE — 82784 ASSAY IGA/IGD/IGG/IGM EACH: CPT | Performed by: INTERNAL MEDICINE

## 2021-03-16 PROCEDURE — 84075 ASSAY ALKALINE PHOSPHATASE: CPT | Performed by: INTERNAL MEDICINE

## 2021-03-16 PROCEDURE — 80048 BASIC METABOLIC PNL TOTAL CA: CPT | Performed by: INTERNAL MEDICINE

## 2021-03-16 ASSESSMENT — PAIN SCALES - GENERAL: PAINLEVEL: MODERATE PAIN (4)

## 2021-03-16 NOTE — NURSING NOTE
Chief Complaint   Patient presents with     Blood Draw     labs drawn via  by RN in lab     BP (!) 128/95   Pulse 129   Temp 97.5  F (36.4  C) (Oral)   Resp 18   Wt 61.9 kg (136 lb 8 oz)   SpO2 98%   BMI 28.53 kg/m      Labs drawn via right antecubital venipuncture. Pt tolerated well & checked in for next appointment.    Deanna El RN on 3/16/2021 at 11:09 AM

## 2021-03-16 NOTE — NURSING NOTE
"Oncology Rooming Note    March 16, 2021 11:22 AM   Amelia Michel is a 60 year old female who presents for:    Chief Complaint   Patient presents with     Blood Draw     labs drawn via  by RN in lab     RECHECK     Return visit with Dr. Rodriguez r/t DLBCL     Initial Vitals: BP (!) 128/95   Pulse 129   Temp 97.5  F (36.4  C) (Oral)   Resp 18   Wt 61.9 kg (136 lb 8 oz)   SpO2 98%   BMI 28.53 kg/m   Estimated body mass index is 28.53 kg/m  as calculated from the following:    Height as of 10/9/20: 1.473 m (4' 10\").    Weight as of this encounter: 61.9 kg (136 lb 8 oz). Body surface area is 1.59 meters squared.  Moderate Pain (4) Comment: \"arthritic\"   No LMP recorded. Patient is postmenopausal.  Allergies reviewed: Yes   Medications reviewed: Yes    Medications: Medication refills not needed today.  Pharmacy name entered into [employer]: [id number] - [facility] [address] AT [facility] [address]    Clinical concerns: Pt presents to clinic for return visit with Dr. Rodriguez HX: DLBCL. Pt states her ankle is sore today from her previous procedure in October, and she is also concerned about her blood pressure and HR being high with vitals during this visit. Pt states she will follow up with her cardiologist regarding her cardiac concerns. Dr. Rodriguez notified.    Yumiko Vanessa RN            "

## 2021-03-16 NOTE — LETTER
3/16/2021         RE: Amelia Michel  [address]  [place] [address]        Dear Colleague,    Thank you for referring your patient, Amelia Michel, to the [facility] [place]. Please see a copy of my visit note below.    Hematology/ Oncology Follow Up Visit  March 16, 2021     Disease and Treatment History:  1. Complicated patient with history of Rheumatoid Arthritis status post long term treatment with methotrexate with recent significant complicated course with cardiac arrest, admission with hypotension, sepsis, ICU stay and intubation with subsequent additional readmission from TCU in 6/2017 for FUO with extensive work-up with eventual finding of progression cytopenias with eventual lymphoma diagnosis  2. Bone Marrow Biopsy: 7/12/2017: Highly suspicious for involvement by B cell lymphoma with foci of large atypical CD20+ cells  3. PET CT 7/19/2017: Extensive activity: Right paratracheal lesion with SUV 28, many liver lesions with SUV 15, cardiac lesion intraarterial septum, numerous bone lesions.  4. 7/21 Liver Biopsy: Consistent with Diffuse Large B Cell Lymphoma non-germinal center phenotype  -- FISH for myc, bcl2, bcl6 negative for double-hit lymphoma  5. IPI based on Age < 60 (0 points) + PS 2 (1 + point) + Stage IV Disease (1 point) + Extranodal disease > 1 site (1 point) + elevated LDH (1 point) = 4 Poor Risk Disease  6. Cycle 1 given as R-EPOCH Day 1 = 7/27/2017  -- complications of transfusion dependence and need for acute rehab placement (likely more result of extensive hospital stays)  - LP negative for CNS involvement 8/23/2017  7. Cycle #2: Transition to R-CHOP Day 1 = 8/22/2017  - Day 15 high dose MTX for CNS prophylaxis  - complicated by significant fluid overload and PICC associated DVT  8. Cycle #3 Day 1 = 9/20/2017 Post Cycle 3 PET CR. Cycle 4 10/11/2017 + IT prophylaxis, Cycle 5 11/8/17, Cycle 6 11/29/2017 + IT prophylaxis  --  Post Treatment completion PET 1/15/2018: Complete Remission        HPI:Amelia presented today for a routine follow-up. She has been doing very well.  She has been recovering gradually from her right ankle surgery in 10/2020. She started walking with no issues. Her blood pressure has been uncontrolled recently.  She will contact her cardiologist about that. Her last pacemaker check was in October 2020. She otherwise denied any recent fever, chills, nausea, vomiting, SOB, chest pain, coughing, hemoptysis, night sweats, neck swellings, urinary or bowel symptoms, weight or appetite changes.    10 point ROS otherwise negative        Physical Exam:  BP (!) 128/95   Pulse 129   Temp 97.5  F (36.4  C) (Oral)   Resp 18   Wt 61.9 kg (136 lb 8 oz)   SpO2 98%   BMI 28.53 kg/m       Gen: Alert, in NAD  Eyes: EOMI, sclera anicteric, PERRL  HENT      Head: NC/AT     Ears: No external auricular lesions     Nose/sinus: No rhinorrhea or epistaxis     Oral Cavity/Oropharynx: MMM, no thrush noted  Neck:  No LAD   Pulm: Breathing comfortably on room air, no audible wheezes or ronchi  CV: Well-perfused, no cyanosis  Abdominal: Soft, nondistended  Skin: Normal color and turgor  Psych: Appropriate mood and affect  Ext: HARRY stockings     Labs: (Most recent labs are still pending)    Lab Results   Component Value Date    WBC 5.5 10/14/2020     Lab Results   Component Value Date    RBC 4.30 10/14/2020     Lab Results   Component Value Date    HGB 15.5 10/14/2020     Lab Results   Component Value Date    HCT 46.4 10/14/2020     No components found for: MCT  Lab Results   Component Value Date     10/14/2020     Lab Results   Component Value Date    MCH 36.0 10/14/2020     Lab Results   Component Value Date    MCHC 33.4 10/14/2020     Lab Results   Component Value Date    RDW 13.0 10/14/2020     Lab Results   Component Value Date     10/14/2020          Assessment/Plan:   A/P: 61 yo woman with history of rhematoid arthritis  and history of stage 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  She is currently on CR.  No signs of disease recurrence or progression.     1. Lymphoma: Got cycle #1 in the hospital and I chose R-EPOCH for infusional nature due to possible intracardiac involvement and also extensive disease to allow for slower lymphoma kill. Tolerated this well, aside from significant cytopenias (which were present at diagnosis). Then transitioned to R-CHOP to finish out a total of 6 cycles of chemotherapy (1 R-EPOCH and 5 R-CHOP) with initially planned for High Dose MTX on Day 15 of cycle 2,4, and 6 due to high IPI but then transition to prophy IT chemo on cycle 4 and 6 due to toxicity with the high dose methotrexate.   - PET CT post 3 cycles CR1 and repeat PET CT post therapy completion in 1/2018 showed CR1.   - Final Surveillance Scans in 9/2019 showed ongoing remission   - No further scans unless symptoms develop. No sign of disease recurrence     2. Rheumatoid arthritis: is on prednisone 5 mg daily and plaquenil. Stable. She is interested in trying to taper her prednisone but wants to wait until after her right ankle surgery gets finished     3. CV: A. Fib. Post pacemaker placement.  - Last pacemaker check in 10/2020   -- Pericardial effusion noted during hospitalization in 3/2018 and pericarditis on 3/8/2018 MRI. Repeat MRI 4/12/2018 showed ongoing pericardial effusion with the start of organization felt to be ongoing inflammation.  -   Blood pressure is uncontrolled.  She will contact her cardiologist     4. Heme: Counts today are still pending     5. Neuropathy: on gabapentin. Stable. No worsening but no significant improvement either        Final Plan:  Return to clinic in 6 months with regular labs prior then annually    The patient was seen and discussed with staff, Dr. Rodriguez.     Missael Jimenez MD  PGY-4 Resident, Radiation Oncology  [facility]  [place]  Phone:[phone]  Pager:[phone]    Attending Addendum:  The patient was seen and evaluated. All medical records, testing results were reviewed and the plan was discussed with the resident. The note above has been edited to reflect my findings.    Additional comments:  Amelia is doing well. Had surgery on right ankle and now no longer needing the brace. Feels overall good. No B symptoms. No symptoms of disease recurrence.    Looks clinically well. Edema diminished in legs.     Labs: Purple top clotted and electrolytes severely hemolyzed with resultant falsely elevated K.    Will discuss repeating these close to home     [place] and labs in 6 months and then space out to yearly.    Lenore Rodriguez MD

## 2021-03-16 NOTE — PROGRESS NOTES
Hematology/ Oncology Follow Up Visit  March 16, 2021     Disease and Treatment History:  1. Complicated patient with history of Rheumatoid Arthritis status post long term treatment with methotrexate with recent significant complicated course with cardiac arrest, admission with hypotension, sepsis, ICU stay and intubation with subsequent additional readmission from TCU in 6/2017 for FUO with extensive work-up with eventual finding of progression cytopenias with eventual lymphoma diagnosis  2. Bone Marrow Biopsy: 7/12/2017: Highly suspicious for involvement by B cell lymphoma with foci of large atypical CD20+ cells  3. PET CT 7/19/2017: Extensive activity: Right paratracheal lesion with SUV 28, many liver lesions with SUV 15, cardiac lesion intraarterial septum, numerous bone lesions.  4. 7/21 Liver Biopsy: Consistent with Diffuse Large B Cell Lymphoma non-germinal center phenotype  -- FISH for myc, bcl2, bcl6 negative for double-hit lymphoma  5. IPI based on Age < 60 (0 points) + PS 2 (1 + point) + Stage IV Disease (1 point) + Extranodal disease > 1 site (1 point) + elevated LDH (1 point) = 4 Poor Risk Disease  6. Cycle 1 given as R-EPOCH Day 1 = 7/27/2017  -- complications of transfusion dependence and need for acute rehab placement (likely more result of extensive hospital stays)  - LP negative for CNS involvement 8/23/2017  7. Cycle #2: Transition to R-CHOP Day 1 = 8/22/2017  - Day 15 high dose MTX for CNS prophylaxis  - complicated by significant fluid overload and PICC associated DVT  8. Cycle #3 Day 1 = 9/20/2017 Post Cycle 3 PET CR. Cycle 4 10/11/2017 + IT prophylaxis, Cycle 5 11/8/17, Cycle 6 11/29/2017 + IT prophylaxis  -- Post Treatment completion PET 1/15/2018: Complete Remission        HPI:Amelia presented today for a routine follow-up. She has been doing very well.  She has been recovering gradually from her right ankle surgery in 10/2020. She started walking with no issues. Her blood pressure has been  uncontrolled recently.  She will contact her cardiologist about that. Her last pacemaker check was in October 2020. She otherwise denied any recent fever, chills, nausea, vomiting, SOB, chest pain, coughing, hemoptysis, night sweats, neck swellings, urinary or bowel symptoms, weight or appetite changes.    10 point ROS otherwise negative        Physical Exam:  BP (!) 128/95   Pulse 129   Temp 97.5  F (36.4  C) (Oral)   Resp 18   Wt 61.9 kg (136 lb 8 oz)   SpO2 98%   BMI 28.53 kg/m       Gen: Alert, in NAD  Eyes: EOMI, sclera anicteric, PERRL  HENT      Head: NC/AT     Ears: No external auricular lesions     Nose/sinus: No rhinorrhea or epistaxis     Oral Cavity/Oropharynx: MMM, no thrush noted  Neck:  No LAD   Pulm: Breathing comfortably on room air, no audible wheezes or ronchi  CV: Well-perfused, no cyanosis  Abdominal: Soft, nondistended  Skin: Normal color and turgor  Psych: Appropriate mood and affect  Ext: HARRY stockings     Labs: (Most recent labs are still pending)    Lab Results   Component Value Date    WBC 5.5 10/14/2020     Lab Results   Component Value Date    RBC 4.30 10/14/2020     Lab Results   Component Value Date    HGB 15.5 10/14/2020     Lab Results   Component Value Date    HCT 46.4 10/14/2020     No components found for: MCT  Lab Results   Component Value Date     10/14/2020     Lab Results   Component Value Date    MCH 36.0 10/14/2020     Lab Results   Component Value Date    MCHC 33.4 10/14/2020     Lab Results   Component Value Date    RDW 13.0 10/14/2020     Lab Results   Component Value Date     10/14/2020          Assessment/Plan:   A/P: 61 yo woman with history of rhematoid arthritis and history of stage 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  She is currently on CR.  No signs of disease recurrence or progression.     1. Lymphoma: Got cycle #1 in the hospital and I chose R-EPOCH for infusional nature due to possible intracardiac involvement  and also extensive disease to allow for slower lymphoma kill. Tolerated this well, aside from significant cytopenias (which were present at diagnosis). Then transitioned to R-CHOP to finish out a total of 6 cycles of chemotherapy (1 R-EPOCH and 5 R-CHOP) with initially planned for High Dose MTX on Day 15 of cycle 2,4, and 6 due to high IPI but then transition to prophy IT chemo on cycle 4 and 6 due to toxicity with the high dose methotrexate.   - PET CT post 3 cycles CR1 and repeat PET CT post therapy completion in 1/2018 showed CR1.   - Final Surveillance Scans in 9/2019 showed ongoing remission   - No further scans unless symptoms develop. No sign of disease recurrence     2. Rheumatoid arthritis: is on prednisone 5 mg daily and plaquenil. Stable. She is interested in trying to taper her prednisone but wants to wait until after her right ankle surgery gets finished     3. CV: A. Fib. Post pacemaker placement.  - Last pacemaker check in 10/2020   -- Pericardial effusion noted during hospitalization in 3/2018 and pericarditis on 3/8/2018 MRI. Repeat MRI 4/12/2018 showed ongoing pericardial effusion with the start of organization felt to be ongoing inflammation.  -   Blood pressure is uncontrolled.  She will contact her cardiologist     4. Heme: Counts today are still pending     5. Neuropathy: on gabapentin. Stable. No worsening but no significant improvement either        Final Plan:  Return to clinic in 6 months with regular labs prior then annually    The patient was seen and discussed with staff, Dr. Rodriguez.     Missael Jimenez MD  PGY-4 Resident, Radiation Oncology  [facility]  Phone:[phone]  Pager:[phone]    Attending Addendum:  The patient was seen and evaluated. All medical records, testing results were reviewed and the plan was discussed with the resident. The note above has been edited to reflect my findings.    Additional comments:  Amelia is doing well. Had surgery on  right ankle and now no longer needing the brace. Feels overall good. No B symptoms. No symptoms of disease recurrence.    Looks clinically well. Edema diminished in legs.     Labs: Purple top clotted and electrolytes severely hemolyzed with resultant falsely elevated K.    Will discuss repeating these close to home     [place] and labs in 6 months and then space out to yearly.    Lenore Rodriguez MD

## 2021-03-17 LAB — IGG SERPL-MCNC: 1197 MG/DL (ref 610–1616)

## 2021-03-17 NOTE — TELEPHONE ENCOUNTER
Received message from MD to organize labs in [place], however Amelia requested a lab appointment in [place], [place] via MyScreen. Reviewed local lab options and it is possible to have [facility] labs in [place], [place]. Sent MyScreen to inform Amelia and sent appt request to scheduling for 3/25.

## 2021-03-18 ENCOUNTER — PATIENT OUTREACH (OUTPATIENT)
Dept: ONCOLOGY | Facility: CLINIC | Age: 61
End: 2021-03-18

## 2021-03-18 NOTE — PROGRESS NOTES
Amelia called about her labs that are due on 3/25. She had received a call from someone named Stephanie who asked for the fax number for [facility] on [address]. Discussed as long as the clinic is branded as [facility] they should be able to get our orders but I will call myself so she doesn't need to worry about this while she's on vacation in [place]. She was appreciative and will return to [place] on 3/24. Agreed to request labs on 3/25 in the morning, as she has an appointment at 2pm that day.

## 2021-03-19 ENCOUNTER — TELEPHONE (OUTPATIENT)
Dept: ONCOLOGY | Facility: CLINIC | Age: 61
End: 2021-03-19

## 2021-03-19 NOTE — TELEPHONE ENCOUNTER
The labs have been faxed to [facility] at [phone].     Priscila Rojas MA

## 2021-03-25 ENCOUNTER — AMBULATORY - HEALTHEAST (OUTPATIENT)
Dept: NURSING | Facility: CLINIC | Age: 61
End: 2021-03-25

## 2021-03-25 ENCOUNTER — AMBULATORY - HEALTHEAST (OUTPATIENT)
Dept: LAB | Facility: CLINIC | Age: 61
End: 2021-03-25

## 2021-03-25 DIAGNOSIS — C83.30 LYMPHOMA, LARGE-CELL, DIFFUSE (H): ICD-10-CM

## 2021-03-25 LAB
ALBUMIN SERPL-MCNC: 4.3 G/DL (ref 3.5–5)
ALP SERPL-CCNC: 124 U/L (ref 45–120)
ALT SERPL W P-5'-P-CCNC: 35 U/L (ref 0–45)
ANION GAP SERPL CALCULATED.3IONS-SCNC: 14 MMOL/L (ref 5–18)
AST SERPL W P-5'-P-CCNC: 38 U/L (ref 0–40)
BASOPHILS # BLD AUTO: 0 THOU/UL (ref 0–0.2)
BASOPHILS NFR BLD AUTO: 0 % (ref 0–2)
BILIRUB SERPL-MCNC: 1.7 MG/DL (ref 0–1)
BUN SERPL-MCNC: 21 MG/DL (ref 8–22)
CALCIUM SERPL-MCNC: 8.9 MG/DL (ref 8.5–10.5)
CHLORIDE BLD-SCNC: 107 MMOL/L (ref 98–107)
CO2 SERPL-SCNC: 17 MMOL/L (ref 22–31)
CREAT SERPL-MCNC: 0.93 MG/DL (ref 0.6–1.1)
EOSINOPHIL # BLD AUTO: 0 THOU/UL (ref 0–0.4)
EOSINOPHIL NFR BLD AUTO: 1 % (ref 0–6)
ERYTHROCYTE [DISTWIDTH] IN BLOOD BY AUTOMATED COUNT: 14.4 % (ref 11–14.5)
GFR SERPL CREATININE-BSD FRML MDRD: >60 ML/MIN/1.73M2
GLUCOSE BLD-MCNC: 135 MG/DL (ref 70–125)
HCT VFR BLD AUTO: 44.6 % (ref 35–47)
HGB BLD-MCNC: 15.4 G/DL (ref 12–16)
IGA SERPL-MCNC: 1179 MG/DL
IMM GRANULOCYTES # BLD: 0 THOU/UL
IMM GRANULOCYTES NFR BLD: 0 %
LYMPHOCYTES # BLD AUTO: 0.6 THOU/UL (ref 0.8–4.4)
LYMPHOCYTES NFR BLD AUTO: 10 % (ref 20–40)
MCH RBC QN AUTO: 35.7 PG (ref 27–34)
MCHC RBC AUTO-ENTMCNC: 34.5 G/DL (ref 32–36)
MCV RBC AUTO: 104 FL (ref 80–100)
MONOCYTES # BLD AUTO: 0.5 THOU/UL (ref 0–0.9)
MONOCYTES NFR BLD AUTO: 9 % (ref 2–10)
NEUTROPHILS # BLD AUTO: 4.5 THOU/UL (ref 2–7.7)
NEUTROPHILS NFR BLD AUTO: 80 % (ref 50–70)
PLATELET # BLD AUTO: 126 THOU/UL (ref 140–440)
PMV BLD AUTO: 9.5 FL (ref 7–10)
POTASSIUM BLD-SCNC: 4.1 MMOL/L (ref 3.5–5)
PROT SERPL-MCNC: 6.9 G/DL (ref 6–8)
RBC # BLD AUTO: 4.31 MILL/UL (ref 3.8–5.4)
SODIUM SERPL-SCNC: 138 MMOL/L (ref 136–145)
WBC: 5.6 THOU/UL (ref 4–11)

## 2021-03-30 ENCOUNTER — HOSPITAL ENCOUNTER (OUTPATIENT)
Dept: BONE DENSITY | Facility: CLINIC | Age: 61
Discharge: HOME | End: 2021-03-30
Attending: INTERNAL MEDICINE | Admitting: INTERNAL MEDICINE
Payer: COMMERCIAL

## 2021-03-30 DIAGNOSIS — M81.0 OSTEOPOROSIS, UNSPECIFIED OSTEOPOROSIS TYPE, UNSPECIFIED PATHOLOGICAL FRACTURE PRESENCE: ICD-10-CM

## 2021-03-30 DIAGNOSIS — Z79.52 LONG TERM (CURRENT) USE OF SYSTEMIC STEROIDS: ICD-10-CM

## 2021-03-30 PROCEDURE — 77080 DXA BONE DENSITY AXIAL: CPT

## 2021-04-15 ENCOUNTER — AMBULATORY - HEALTHEAST (OUTPATIENT)
Dept: NURSING | Facility: CLINIC | Age: 61
End: 2021-04-15

## 2021-04-26 ENCOUNTER — CARE COORDINATION (OUTPATIENT)
Dept: CARDIOLOGY | Facility: CLINIC | Age: 61
End: 2021-04-26

## 2021-04-26 NOTE — PROGRESS NOTES
Received message from clinic coordinator that the patient was experiencing some heart failure symptoms she would like to discuss.  Reached out to Amelia to discuss concerns.  Amelia notes that she has gained 5 pounds over the last month, no more then 1.5 pounds overnight.  She occasionally has troubles lying flat, she feels nauseated and bloated, and has been short of breath with activities such as walking to the mail box.  She denies lower extremity swelling, she wears compression stockings daily due to lymphedema.  She had ankle surgery recently and has just been getting back to moving around but she felt she had more exercise capacity prior.  Notes that the symptoms she is feeling is similar to those she has had in the past with heart failure exacerbations.  She confirms that she is taking Lasix 20 mg daily, Atenolol 25 mg BID, Spironolactone 25 mg daily, and Eliquis 5 mg BID.  Will review patient's concerns with MD and call back with further recommendations.  Amelia states that she understands information provided and will call with further questions or concerns.    Lionel Molina RN  Cardiology RN Care Coordinator  [phone]

## 2021-04-28 ENCOUNTER — CARE COORDINATION (OUTPATIENT)
Dept: CARDIOLOGY | Facility: CLINIC | Age: 61
End: 2021-04-28

## 2021-04-28 NOTE — PROGRESS NOTES
Date: 4/28/2021    Time of Call: 12:51 PM     Diagnosis:  VSD s/p repair     [ TORB ] Ordering provider: Dr. Eaton  Order: Patient to re-establish with [facility] clinic     Order received by: ABBEY Mendoza     Follow-up/additional notes: Sent to [facility]. Sent patient MyChart update.

## 2021-05-12 DIAGNOSIS — T38.0X5A STEROID-INDUCED OSTEOPOROSIS: ICD-10-CM

## 2021-05-12 DIAGNOSIS — M05.79 RHEUMATOID ARTHRITIS INVOLVING MULTIPLE SITES WITH POSITIVE RHEUMATOID FACTOR (H): ICD-10-CM

## 2021-05-12 DIAGNOSIS — M81.8 STEROID-INDUCED OSTEOPOROSIS: ICD-10-CM

## 2021-05-13 RX ORDER — HYDROXYCHLOROQUINE SULFATE 200 MG/1
200 TABLET, FILM COATED ORAL DAILY
Qty: 90 TABLET | Refills: 2 | OUTPATIENT
Start: 2021-05-13

## 2021-05-18 ENCOUNTER — ANCILLARY PROCEDURE (OUTPATIENT)
Dept: CARDIOLOGY | Facility: CLINIC | Age: 61
End: 2021-05-18
Attending: INTERNAL MEDICINE
Payer: COMMERCIAL

## 2021-05-18 DIAGNOSIS — R00.1 BRADYCARDIA: ICD-10-CM

## 2021-05-18 PROCEDURE — 93296 REM INTERROG EVL PM/IDS: CPT

## 2021-05-18 PROCEDURE — 93294 REM INTERROG EVL PM/LDLS PM: CPT | Performed by: INTERNAL MEDICINE

## 2021-05-20 DIAGNOSIS — I50.22 CHRONIC SYSTOLIC HEART FAILURE (H): Primary | ICD-10-CM

## 2021-05-24 DIAGNOSIS — M81.0 OSTEOPOROSIS, UNSPECIFIED OSTEOPOROSIS TYPE, UNSPECIFIED PATHOLOGICAL FRACTURE PRESENCE: ICD-10-CM

## 2021-05-24 DIAGNOSIS — Z79.52 LONG TERM (CURRENT) USE OF SYSTEMIC STEROIDS: ICD-10-CM

## 2021-05-25 RX ORDER — ALENDRONATE SODIUM 70 MG/1
70 TABLET ORAL
Qty: 12 TABLET | Refills: 0 | Status: SHIPPED | OUTPATIENT
Start: 2021-05-25 | End: 2021-08-17

## 2021-05-25 NOTE — TELEPHONE ENCOUNTER
alendronate (FOSAMAX) 70 MG tablet    Last Written Prescription Date: 3/4/2021  Last Fill Quantity: 12,   # refills: 0  Last Office Visit : 3/10/2021  Future Office visit:  9/9/2021  12 Tabs, sent to pharm 3/4/2021      Maria Luisa Westfall RN  Central Triage Red Flags/Med Refills

## 2021-05-26 ENCOUNTER — OFFICE VISIT (OUTPATIENT)
Dept: CARDIOLOGY | Facility: CLINIC | Age: 61
End: 2021-05-26
Attending: NURSE PRACTITIONER
Payer: COMMERCIAL

## 2021-05-26 VITALS
HEART RATE: 124 BPM | HEIGHT: 58 IN | BODY MASS INDEX: 28.13 KG/M2 | OXYGEN SATURATION: 96 % | WEIGHT: 134 LBS | DIASTOLIC BLOOD PRESSURE: 91 MMHG | SYSTOLIC BLOOD PRESSURE: 130 MMHG

## 2021-05-26 DIAGNOSIS — I50.22 CHRONIC SYSTOLIC HEART FAILURE (H): Primary | ICD-10-CM

## 2021-05-26 DIAGNOSIS — R73.09 ELEVATED GLUCOSE: ICD-10-CM

## 2021-05-26 DIAGNOSIS — I47.19 ATRIAL TACHYCARDIA (H): ICD-10-CM

## 2021-05-26 DIAGNOSIS — I50.22 CHRONIC SYSTOLIC HEART FAILURE (H): ICD-10-CM

## 2021-05-26 LAB
ANION GAP SERPL CALCULATED.3IONS-SCNC: 11 MMOL/L (ref 3–14)
BUN SERPL-MCNC: 21 MG/DL (ref 7–30)
CALCIUM SERPL-MCNC: 9.3 MG/DL (ref 8.5–10.1)
CHLORIDE SERPL-SCNC: 101 MMOL/L (ref 94–109)
CO2 SERPL-SCNC: 24 MMOL/L (ref 20–32)
CREAT SERPL-MCNC: 1 MG/DL (ref 0.52–1.04)
GFR SERPL CREATININE-BSD FRML MDRD: 61 ML/MIN/{1.73_M2}
GLUCOSE SERPL-MCNC: 100 MG/DL (ref 70–99)
HBA1C MFR BLD: 6.2 % (ref 0–5.6)
NT-PROBNP SERPL-MCNC: 1274 PG/ML (ref 0–125)
POTASSIUM SERPL-SCNC: 3.6 MMOL/L (ref 3.4–5.3)
SODIUM SERPL-SCNC: 136 MMOL/L (ref 133–144)

## 2021-05-26 PROCEDURE — 36415 COLL VENOUS BLD VENIPUNCTURE: CPT | Performed by: PATHOLOGY

## 2021-05-26 PROCEDURE — 99214 OFFICE O/P EST MOD 30 MIN: CPT | Mod: 25 | Performed by: NURSE PRACTITIONER

## 2021-05-26 PROCEDURE — 83036 HEMOGLOBIN GLYCOSYLATED A1C: CPT | Performed by: PATHOLOGY

## 2021-05-26 PROCEDURE — 83880 ASSAY OF NATRIURETIC PEPTIDE: CPT | Performed by: PATHOLOGY

## 2021-05-26 PROCEDURE — 80048 BASIC METABOLIC PNL TOTAL CA: CPT | Performed by: PATHOLOGY

## 2021-05-26 PROCEDURE — 93280 PM DEVICE PROGR EVAL DUAL: CPT | Performed by: INTERNAL MEDICINE

## 2021-05-26 PROCEDURE — G0463 HOSPITAL OUTPT CLINIC VISIT: HCPCS

## 2021-05-26 RX ORDER — ATENOLOL 25 MG/1
TABLET ORAL
Qty: 90 TABLET | Refills: 3 | Status: SHIPPED | OUTPATIENT
Start: 2021-05-26 | End: 2021-09-16

## 2021-05-26 RX ORDER — DIGOXIN 125 MCG
125 TABLET ORAL DAILY
Qty: 30 TABLET | Refills: 1 | Status: SHIPPED | OUTPATIENT
Start: 2021-05-26 | End: 2021-06-23

## 2021-05-26 ASSESSMENT — MIFFLIN-ST. JEOR: SCORE: 1067.57

## 2021-05-26 ASSESSMENT — PAIN SCALES - GENERAL: PAINLEVEL: NO PAIN (0)

## 2021-05-26 NOTE — NURSING NOTE
Chief Complaint   Patient presents with     New Patient     60 year old female with chronic diastolic heart failure presents for follow up with labs prior.      Vitals were taken and medications reconciled.     Aurelio Bearden CMA  11:19 AM

## 2021-05-26 NOTE — PROGRESS NOTES
HPI:   Marilu is a 60 year old adult with a past medical history including VSD s/p repair 1969, RA, HTN, Insomnia, Atrial Tachyarrhythmias, cardiac arrest, SSS s/p PPM 12/19, DLBCL, and exudative pericardial effusion. She presents to [facility] clinic for initial evaluation per referral from EP.     Her cardiac history dates back to 5/17 with atrial tachyarrhythmias with LVEF 40-45%. She underwent repeat hospitalization 6/17 due to VF arrest in setting of normal angiogram and found to have DLBCL complicated by masses on the coronary cusps and LVOT. She underwent R-EPOCH one cycle and 5 subsequent cycles of R-CHOP completed in 12/17 with her course complicated by pericardial effusion treated with colcichine. She has struggled with recurrent arrhythmia issues. Prior Cardiac MRI from 7031-2182 with normal EF.     She complains of progressively worsening MONTALVO for the past 3 weeks. She notes 4 pillow orthopnea, LE edema, with mild abdominal distention.She notes chronic nausea. She is able to ambulate 150 steps prior to needing rest. She notes decreased appetite worsening over the past 2 weeks.  denies fever, chills, lightheadedness, dizziness, chest pain, SOB,, PND, nausea, vomiting, diarrhea, hematochezia, melena. His weights have remained relatively stable at home. She continues to follow a low sodium diet. Her home -80-90's.      PAST MEDICAL HISTORY:  Past Medical History:   Diagnosis Date     Arthritis      Cardiac arrest (H)      History of chemotherapy      Hypertension      Neuropathy        FAMILY HISTORY:  Family History   Problem Relation Age of Onset     Breast Cancer Mother      Hypertension Mother      Alzheimer Disease Mother      Cerebrovascular Disease Mother      Hypertension Father      Cerebrovascular Disease Father      Atrial fibrillation Father      Lymphoma Father      Prostate Cancer Father      Diabetes Paternal Grandmother      Alzheimer Disease Maternal Grandmother         likely     Arthritis  Maternal Grandfather      Pneumonia Maternal Grandfather        SOCIAL HISTORY:  Social History     Socioeconomic History     Marital status:      Spouse name: Romeo Michel     Number of children: 0     Years of education: Not on file     Highest education level: Not on file   Occupational History     Employer: [employer]   Social Needs     Financial resource strain: Not on file     Food insecurity     Worry: Not on file     Inability: Not on file     Transportation needs     Medical: Not on file     Non-medical: Not on file   Tobacco Use     Smoking status: Never Smoker     Smokeless tobacco: Never Used   Substance and Sexual Activity     Alcohol use: Yes     Comment: none     Drug use: No     Sexual activity: Not on file   Lifestyle     Physical activity     Days per week: Not on file     Minutes per session: Not on file     Stress: Not on file   Relationships     Social connections     Talks on phone: Not on file     Gets together: Not on file     Attends [employer] service: Not on file     Active member of club or organization: Not on file     Attends meetings of clubs or organizations: Not on file     Relationship status: Not on file     Intimate partner violence     Fear of current or ex partner: Not on file     Emotionally abused: Not on file     Physically abused: Not on file     Forced sexual activity: Not on file   Other Topics Concern     Parent/sibling w/ CABG, MI or angioplasty before 65F 55M? No   Social History Narrative     Not on file       CURRENT MEDICATIONS:  Outpatient Medications Prior to Visit   Medication Sig Dispense Refill     acetaminophen (TYLENOL) 500 MG tablet Take 500 mg by mouth every 6 hours as needed for mild pain       alendronate (FOSAMAX) 70 MG tablet Take 1 tablet (70 mg) by mouth every 7 days On Monday 12 tablet 0     apixaban ANTICOAGULANT (ELIQUIS ANTICOAGULANT) 5 MG tablet Take 1 tablet (5 mg) by mouth 2 times daily 180 tablet 1     furosemide  (LASIX) 20 MG tablet Take 1 tablet (20 mg) by mouth daily 90 tablet 1     gabapentin (NEURONTIN) 100 MG capsule TAKE 2 CAPSULES(200 MG) BY MOUTH THREE TIMES DAILY 540 capsule 1     hydroxychloroquine (PLAQUENIL) 200 MG tablet Take 1 tablet (200 mg) by mouth daily Overdue for annual eye exam 90 tablet 2     magnesium 250 MG tablet Take 1 tablet by mouth At Bedtime       multivitamin, therapeutic with minerals (THERA-VIT-M) TABS tablet Take 1 tablet by mouth daily 30 each 0     predniSONE (DELTASONE) 5 MG tablet Take 1 tablet (5 mg) by mouth daily 90 tablet 3     spironolactone (ALDACTONE) 25 MG tablet Take 1 tablet (25 mg) by mouth daily 90 tablet 3     calcium carbonate 600 mg-vitamin D 400 units (CALTRATE) 600-400 MG-UNIT per tablet Take 2 tablets by mouth daily       STATIN NOT PRESCRIBED (INTENTIONAL) Please choose reason not prescribed, below (Patient not taking: Reported on 10/9/2020)       tolterodine ER (DETROL LA) 4 MG 24 hr capsule Take 1 capsule (4 mg) by mouth daily (Patient not taking: Reported on 5/26/2021) 90 capsule 3     atenolol (TENORMIN) 25 MG tablet TAKE 1 TABLET BY MOUTH TWICE DAILY 180 tablet 3     No facility-administered medications prior to visit.        ROS:   CONSTITUTIONAL: Denies fever, chills, fatigue, or weight fluctuations.   HEENT: Denies headache, vision changes, and changes in speech.   CV: Refer to HPI.   PULMONARY: Refer to HPI.   GI:Denies nausea, vomiting, diarrhea, and abdominal pain. Bowel movements are regular.   :Denies urinary alterations, dysuria, urinary frequency, hematuria, and abnormal drainage.   EXT:Denies lower extremity edema.   SKIN:Denies abnormal rashes or lesions.   MUSCULOSKELETAL:Denies upper or lower extremity weakness and pain.   NEUROLOGIC:Denies lightheadedness, dizziness, seizures, or upper or lower extremity paresthesia.     EXAM:  BP (!) 130/91 (BP Location: Right arm, Patient Position: Chair, Cuff Size: Adult Regular)   Pulse 124   Ht 1.473 m (4'  "10\")   Wt 60.8 kg (134 lb)   SpO2 96%   BMI 28.01 kg/m    GENERAL: Appears alert and oriented times three.   HEENT: Eye symmetrical and free of discharge bilaterally. Mucous membranes moist and without lesions.  NECK: Supple and without lymphadenopathy. JVD at clavicular line.   CV: RRR, S1S2 present without murmur, rub, or gallop.   RESPIRATORY: Respirations regular, even, and unlabored. Lungs CTA throughout.   GI: Soft and non distended with normoactive bowel sounds present in all quadrants. No tenderness, rebound, guarding. No organomegaly.   EXTREMITIES: +1 bilateral LE peripheral edema. 2+ bilateral pedal pulses.   NEUROLOGIC: Alert and orientated x 3. CN II-XII grossly intact. No focal deficits.   MUSCULOSKELETAL: No joint swelling or tenderness.   SKIN: No jaundice. No rashes or lesions.     Labs:  CBC RESULTS:  Lab Results   Component Value Date    WBC Canceled, Test credited 03/16/2021    RBC Canceled, Test credited 03/16/2021    HGB Canceled, Test credited 03/16/2021    HCT Canceled, Test credited 03/16/2021    MCV Canceled, Test credited 03/16/2021    MCH Canceled, Test credited 03/16/2021    MCHC Canceled, Test credited 03/16/2021    RDW Canceled, Test credited 03/16/2021    PLT Canceled, Test credited 03/16/2021       CMP RESULTS:  Lab Results   Component Value Date     05/26/2021    POTASSIUM 3.6 05/26/2021    CHLORIDE 101 05/26/2021    CO2 24 05/26/2021    ANIONGAP 11 05/26/2021     (H) 05/26/2021    BUN 21 05/26/2021    CR 1.00 05/26/2021    GFRESTIMATED 61 05/26/2021    GFRESTBLACK 71 05/26/2021    VIKTORIYA 9.3 05/26/2021    BILITOTAL 1.4 (H) 03/16/2021    ALBUMIN 3.7 03/16/2021    ALKPHOS 129 03/16/2021    ALT 58 (H) 03/16/2021    AST Canceled, Test credited 03/16/2021        INR RESULTS:  Lab Results   Component Value Date    INR 3.42 (H) 03/07/2018       Lab Results   Component Value Date    MAG 1.9 04/12/2018     Lab Results   Component Value Date    NTBNPI 485 12/12/2019 "     Diagnostics:  Echo 10/9/20:   Interpretation Summary  VSD s/p repair in 1969  Global and regional left ventricular function is normal with an EF of 60-65%.  No flow acceleration is seen across the septum.  Global right ventricular function is normal. Mild right ventricular dilation  is present.  There is moderate tricuspid regurgitation.  The estimated PA systolic pressure is 32 mmHg but this is likely to be  underestimated due to the presence of multiple jets.  This study was compared with the study from 09/11/2017. There has been no  change in the severity of the tricuspid regurgitation or the RV size/function.    Device check 5/26:  Device: [employer] [id number] Claudia XT DR GALLEGOS  Normal device function  Mode: AAIR<=>DDDR 60 - 130 bpm  AP: 0.8%  : 2.8%  Intrinsic Rhythm: AFL w/ VS 85 - 125 bpm  Short V-V intervals: none  Lead Trends Appear Stable: yes  Estimated battery longevity to RRT = 13.3 years.   AF [place] = 90.9%  Anticoagulant: Eliquis  Ventricular Arrhythmia: no new episodes recorded since last remote transmission on 5/18/2021, 1 episode that was recorded as SVT on May 2, 2021 displays what appears to be VT lasting 21 sec @ 200 bpm. Reviewed with Dr. Eaton.   Setting Changes: none    Assessment and Plan:   Marilu is a 60 year old adult with a past medical history including VSD s/p repair 1969, RA, HTN, Insomnia, Atrial Tachyarrhythmias, cardiac arrest, SSS s/p PPM 12/19, DLBCL, and exudative pericardial effusion. She presents to [facility] clinic for initial evaluation per referral from EP with recurrent Aflutter.     HTN. Intolerant to Metoprolol in the past.   - Increase Atenolol to 50 mg in AM and 25 mg in the evening.     Aflutter. History of SSS s/p PPM 12/19. Long standing history of atrial arrythmias.  - Device interrogatino today with Aflutter . ]\  - Case reviewed with Maria Esther Cutler and Dr. Eaton.   - Resume Digoxin 125 mcg po daily.   - Atenolol increased as above.   - EP to discuss recurrent  DCCV with patient.   - Continue Eliquis.   - Would recommend repeat echo when HR controlled, moderate atrial enlargement concerning for HFpEF.     VSD s/p repair.     History of exudative pericardial effusion.   - Stable per CT 3/19.    Follow up Echo when HR controled and CORE in 1 month.       Halle Vasquez, APRN CNP  5/26/2021          CC  SHASHI BENITEZ

## 2021-05-26 NOTE — PATIENT INSTRUCTIONS
It was a pleasure to see you in clinic today. Please do not hesitate to call with any questions or concerns. You are scheduled for a remote Paceamker transmission on 8/25/2021. This will happen automatically in the night. We will call in 1-2 business days to discuss the results with you. We look forward to seeing you in clinic at your next device check in 6 months    Ailyn Alvarez RN  Electrophysiology Nurse Clinician  [facility]  During business hours call:  [phone]  After business hours please call: [phone]- select option #4 and ask for job code [phone].

## 2021-05-26 NOTE — LETTER
5/26/2021      RE: Amelia Michel  [address]  [place] [address]       Dear Colleague,    Thank you for the opportunity to participate in the care of your patient, Amelia Michel, at the [facility] [place] at [facility]. Please see a copy of my visit note below.    HPI:   Marilu is a 60 year old adult with a past medical history including VSD s/p repair 1969, RA, HTN, Insomnia, Atrial Tachyarrhythmias, cardiac arrest, SSS s/p PPM 12/19, DLBCL, and exudative pericardial effusion. She presents to [facility] clinic for initial evaluation per referral from EP.     Her cardiac history dates back to 5/17 with atrial tachyarrhythmias with LVEF 40-45%. She underwent repeat hospitalization 6/17 due to VF arrest in setting of normal angiogram and found to have DLBCL complicated by masses on the coronary cusps and LVOT. She underwent R-EPOCH one cycle and 5 subsequent cycles of R-CHOP completed in 12/17 with her course complicated by pericardial effusion treated with colcichine. She has struggled with recurrent arrhythmia issues. Prior Cardiac MRI from 9824-8533 with normal EF.     She complains of progressively worsening MONTALVO for the past 3 weeks. She notes 4 pillow orthopnea, LE edema, with mild abdominal distention.She notes chronic nausea. She is able to ambulate 150 steps prior to needing rest. She notes decreased appetite worsening over the past 2 weeks.  denies fever, chills, lightheadedness, dizziness, chest pain, SOB,, PND, nausea, vomiting, diarrhea, hematochezia, melena. His weights have remained relatively stable at home. She continues to follow a low sodium diet. Her home -80-90's.      PAST MEDICAL HISTORY:  Past Medical History:   Diagnosis Date     Arthritis      Cardiac arrest (H)      History of chemotherapy      Hypertension      Neuropathy        FAMILY HISTORY:  Family History   Problem Relation Age of Onset     Breast Cancer  Mother      Hypertension Mother      Alzheimer Disease Mother      Cerebrovascular Disease Mother      Hypertension Father      Cerebrovascular Disease Father      Atrial fibrillation Father      Lymphoma Father      Prostate Cancer Father      Diabetes Paternal Grandmother      Alzheimer Disease Maternal Grandmother         likely     Arthritis Maternal Grandfather      Pneumonia Maternal Grandfather        SOCIAL HISTORY:  Social History     Socioeconomic History     Marital status:      Spouse name: Romeo Michel     Number of children: 0     Years of education: Not on file     Highest education level: Not on file   Occupational History     Employer: [employer]   Social Needs     Financial resource strain: Not on file     Food insecurity     Worry: Not on file     Inability: Not on file     Transportation needs     Medical: Not on file     Non-medical: Not on file   Tobacco Use     Smoking status: Never Smoker     Smokeless tobacco: Never Used   Substance and Sexual Activity     Alcohol use: Yes     Comment: none     Drug use: No     Sexual activity: Not on file   Lifestyle     Physical activity     Days per week: Not on file     Minutes per session: Not on file     Stress: Not on file   Relationships     Social connections     Talks on phone: Not on file     Gets together: Not on file     Attends Jewish service: Not on file     Active member of club or organization: Not on file     Attends meetings of clubs or organizations: Not on file     Relationship status: Not on file     Intimate partner violence     Fear of current or ex partner: Not on file     Emotionally abused: Not on file     Physically abused: Not on file     Forced sexual activity: Not on file   Other Topics Concern     Parent/sibling w/ CABG, MI or angioplasty before 65F 55M? No   Social History Narrative     Not on file       CURRENT MEDICATIONS:  Outpatient Medications Prior to Visit   Medication Sig Dispense  Refill     acetaminophen (TYLENOL) 500 MG tablet Take 500 mg by mouth every 6 hours as needed for mild pain       alendronate (FOSAMAX) 70 MG tablet Take 1 tablet (70 mg) by mouth every 7 days On Monday 12 tablet 0     apixaban ANTICOAGULANT (ELIQUIS ANTICOAGULANT) 5 MG tablet Take 1 tablet (5 mg) by mouth 2 times daily 180 tablet 1     furosemide (LASIX) 20 MG tablet Take 1 tablet (20 mg) by mouth daily 90 tablet 1     gabapentin (NEURONTIN) 100 MG capsule TAKE 2 CAPSULES(200 MG) BY MOUTH THREE TIMES DAILY 540 capsule 1     hydroxychloroquine (PLAQUENIL) 200 MG tablet Take 1 tablet (200 mg) by mouth daily Overdue for annual eye exam 90 tablet 2     magnesium 250 MG tablet Take 1 tablet by mouth At Bedtime       multivitamin, therapeutic with minerals (THERA-VIT-M) TABS tablet Take 1 tablet by mouth daily 30 each 0     predniSONE (DELTASONE) 5 MG tablet Take 1 tablet (5 mg) by mouth daily 90 tablet 3     spironolactone (ALDACTONE) 25 MG tablet Take 1 tablet (25 mg) by mouth daily 90 tablet 3     calcium carbonate 600 mg-vitamin D 400 units (CALTRATE) 600-400 MG-UNIT per tablet Take 2 tablets by mouth daily       STATIN NOT PRESCRIBED (INTENTIONAL) Please choose reason not prescribed, below (Patient not taking: Reported on 10/9/2020)       tolterodine ER (DETROL LA) 4 MG 24 hr capsule Take 1 capsule (4 mg) by mouth daily (Patient not taking: Reported on 5/26/2021) 90 capsule 3     atenolol (TENORMIN) 25 MG tablet TAKE 1 TABLET BY MOUTH TWICE DAILY 180 tablet 3     No facility-administered medications prior to visit.        ROS:   CONSTITUTIONAL: Denies fever, chills, fatigue, or weight fluctuations.   HEENT: Denies headache, vision changes, and changes in speech.   CV: Refer to HPI.   PULMONARY: Refer to HPI.   GI:Denies nausea, vomiting, diarrhea, and abdominal pain. Bowel movements are regular.   :Denies urinary alterations, dysuria, urinary frequency, hematuria, and abnormal drainage.   EXT:Denies lower  "extremity edema.   SKIN:Denies abnormal rashes or lesions.   MUSCULOSKELETAL:Denies upper or lower extremity weakness and pain.   NEUROLOGIC:Denies lightheadedness, dizziness, seizures, or upper or lower extremity paresthesia.     EXAM:  BP (!) 130/91 (BP Location: Right arm, Patient Position: Chair, Cuff Size: Adult Regular)   Pulse 124   Ht 1.473 m (4' 10\")   Wt 60.8 kg (134 lb)   SpO2 96%   BMI 28.01 kg/m    GENERAL: Appears alert and oriented times three.   HEENT: Eye symmetrical and free of discharge bilaterally. Mucous membranes moist and without lesions.  NECK: Supple and without lymphadenopathy. JVD at clavicular line.   CV: RRR, S1S2 present without murmur, rub, or gallop.   RESPIRATORY: Respirations regular, even, and unlabored. Lungs CTA throughout.   GI: Soft and non distended with normoactive bowel sounds present in all quadrants. No tenderness, rebound, guarding. No organomegaly.   EXTREMITIES: +1 bilateral LE peripheral edema. 2+ bilateral pedal pulses.   NEUROLOGIC: Alert and orientated x 3. CN II-XII grossly intact. No focal deficits.   MUSCULOSKELETAL: No joint swelling or tenderness.   SKIN: No jaundice. No rashes or lesions.     Labs:  CBC RESULTS:  Lab Results   Component Value Date    WBC Canceled, Test credited 03/16/2021    RBC Canceled, Test credited 03/16/2021    HGB Canceled, Test credited 03/16/2021    HCT Canceled, Test credited 03/16/2021    MCV Canceled, Test credited 03/16/2021    MCH Canceled, Test credited 03/16/2021    MCHC Canceled, Test credited 03/16/2021    RDW Canceled, Test credited 03/16/2021    PLT Canceled, Test credited 03/16/2021       CMP RESULTS:  Lab Results   Component Value Date     05/26/2021    POTASSIUM 3.6 05/26/2021    CHLORIDE 101 05/26/2021    CO2 24 05/26/2021    ANIONGAP 11 05/26/2021     (H) 05/26/2021    BUN 21 05/26/2021    CR 1.00 05/26/2021    GFRESTIMATED 61 05/26/2021    GFRESTBLACK 71 05/26/2021    VIKTORIYA 9.3 05/26/2021    BILITOTAL " 1.4 (H) 03/16/2021    ALBUMIN 3.7 03/16/2021    ALKPHOS 129 03/16/2021    ALT 58 (H) 03/16/2021    AST Canceled, Test credited 03/16/2021        INR RESULTS:  Lab Results   Component Value Date    INR 3.42 (H) 03/07/2018       Lab Results   Component Value Date    MAG 1.9 04/12/2018     Lab Results   Component Value Date    NTBNPI 485 12/12/2019     Diagnostics:  Echo 10/9/20:   Interpretation Summary  VSD s/p repair in 1969  Global and regional left ventricular function is normal with an EF of 60-65%.  No flow acceleration is seen across the septum.  Global right ventricular function is normal. Mild right ventricular dilation  is present.  There is moderate tricuspid regurgitation.  The estimated PA systolic pressure is 32 mmHg but this is likely to be  underestimated due to the presence of multiple jets.  This study was compared with the study from 09/11/2017. There has been no  change in the severity of the tricuspid regurgitation or the RV size/function.    Device check 5/26:  Device: [email or web address] [id number] [place] XT DR GALLEGOS  Normal device function  Mode: AAIR<=>DDDR 60 - 130 bpm  AP: 0.8%  : 2.8%  Intrinsic Rhythm: AFL w/ VS 85 - 125 bpm  Short V-V intervals: none  Lead Trends Appear Stable: yes  Estimated battery longevity to RRT = 13.3 years.   AF [place] = 90.9%  Anticoagulant: Eliquis  Ventricular Arrhythmia: no new episodes recorded since last remote transmission on 5/18/2021, 1 episode that was recorded as SVT on May 2, 2021 displays what appears to be VT lasting 21 sec @ 200 bpm. Reviewed with Dr. Eaton.   Setting Changes: none    Assessment and Plan:   Marilu is a 60 year old adult with a past medical history including VSD s/p repair 1969, RA, HTN, Insomnia, Atrial Tachyarrhythmias, cardiac arrest, SSS s/p PPM 12/19, DLBCL, and exudative pericardial effusion. She presents to [facility] clinic for initial evaluation per referral from EP with recurrent Aflutter.     HTN. Intolerant to Metoprolol in the past.   -  Increase Atenolol to 50 mg in AM and 25 mg in the evening.     Aflutter. History of SSS s/p PPM 12/19. Long standing history of atrial arrythmias.  - Device interrogatino today with Aflutter . ]\  - Case reviewed with Maria Esther Cutler and Dr. Eaton.   - Resume Digoxin 125 mcg po daily.   - Atenolol increased as above.   - EP to discuss recurrent DCCV with patient.   - Continue Eliquis.   - Would recommend repeat echo when HR controlled, moderate atrial enlargement concerning for HFpEF.     VSD s/p repair.     History of exudative pericardial effusion.   - Stable per CT 3/19.    Follow up Echo when HR controled and CORE in 1 month.     Halle Vasquez, ELIZABETH CNP  5/26/2021      CC  SHASHI EATON

## 2021-05-26 NOTE — NURSING NOTE
Diet: Patient instructed regarding a heart healthy diet, including discussion of reduced fat and sodium intake. Patient demonstrated understanding of this information and agreed to call with further questions or concerns.  Labs: Patient was given results of the laboratory testing obtained today. Patient was instructed to return for the next laboratory testing in 1 month with clinic visit. Patient demonstrated understanding of this information and agreed to call with further questions or concerns.   Return Appointment: Patient given instructions regarding scheduling next clinic visit. Patient demonstrated understanding of this information and agreed to call with further questions or concerns.  Medication Change: Patient was educated regarding prescribed medication change, including discussion of the indication, administration, side effects, and when to report to MD or RN. Patient demonstrated understanding of this information and agreed to call with further questions or concerns.  Increase Atenolol to 50 mg in AM, 25 mg in PM  Start Digoxin 125 mcg daily.   Patient stated she understood all health information given and agreed to call with further questions or concerns.

## 2021-05-26 NOTE — PATIENT INSTRUCTIONS
Take your medicines every day, as directed    Changes made today:  o Increase Atenolol to 50 mg in the AM, 25 mg in the PM  o Start Digoxin at 125 mg daily   o    Monitor Your Weight and Symptoms    Contact us if you:      Gain 2 pounds in one day or 5 pounds in one week    Feel more short of breath    Notice more leg swelling    Feel lightheadeded   Change your lifestyle    Limit Salt or Sodium:    2000 mg  Limit Fluids:    2000 mL or approximately 64 ounces  Eat a Heart Healthy Diet    Low in saturated fats  Stay Active:    Aim to move at least 150 minutes every  week         To Contact us    During Business Hours:  [phone], option # 1 (University)  Then option # 4 (medical questions)     After hours, weekends or holidays:   [phone], Option #4  Ask to speak to the On-Call Cardiologist. Inform them you are a CORE/heart failure patient at the [facility].     Use [employer] allows you to communicate directly with your heart team through secure messaging.    PlayPhone can be accessed any time on your phone, computer, or tablet.    If you need assistance, we'd be happy to help!         Keep your Heart Appointments:    Will schedule an echo when HR is down  Will have EP call you about cardioversion  Follow up in [facility] Clinic in one month

## 2021-06-01 LAB
MDC_IDC_LEAD_IMPLANT_DT: NORMAL
MDC_IDC_LEAD_IMPLANT_DT: NORMAL
MDC_IDC_LEAD_LOCATION: NORMAL
MDC_IDC_LEAD_LOCATION: NORMAL
MDC_IDC_LEAD_LOCATION_DETAIL_1: NORMAL
MDC_IDC_LEAD_LOCATION_DETAIL_1: NORMAL
MDC_IDC_LEAD_MFG: NORMAL
MDC_IDC_LEAD_MFG: NORMAL
MDC_IDC_LEAD_MODEL: NORMAL
MDC_IDC_LEAD_MODEL: NORMAL
MDC_IDC_LEAD_POLARITY_TYPE: NORMAL
MDC_IDC_LEAD_POLARITY_TYPE: NORMAL
MDC_IDC_LEAD_SERIAL: NORMAL
MDC_IDC_LEAD_SERIAL: NORMAL
MDC_IDC_LEAD_SPECIAL_FUNCTION: NORMAL
MDC_IDC_LEAD_SPECIAL_FUNCTION: NORMAL
MDC_IDC_MSMT_BATTERY_DTM: NORMAL
MDC_IDC_MSMT_BATTERY_REMAINING_LONGEVITY: 161 MO
MDC_IDC_MSMT_BATTERY_RRT_TRIGGER: 2.62
MDC_IDC_MSMT_BATTERY_STATUS: NORMAL
MDC_IDC_MSMT_BATTERY_VOLTAGE: 3.03 V
MDC_IDC_MSMT_LEADCHNL_RA_IMPEDANCE_VALUE: 361 OHM
MDC_IDC_MSMT_LEADCHNL_RA_IMPEDANCE_VALUE: 437 OHM
MDC_IDC_MSMT_LEADCHNL_RA_SENSING_INTR_AMPL: 2.75 MV
MDC_IDC_MSMT_LEADCHNL_RA_SENSING_INTR_AMPL: 3 MV
MDC_IDC_MSMT_LEADCHNL_RV_IMPEDANCE_VALUE: 418 OHM
MDC_IDC_MSMT_LEADCHNL_RV_IMPEDANCE_VALUE: 513 OHM
MDC_IDC_MSMT_LEADCHNL_RV_PACING_THRESHOLD_AMPLITUDE: 0.62 V
MDC_IDC_MSMT_LEADCHNL_RV_PACING_THRESHOLD_PULSEWIDTH: 0.4 MS
MDC_IDC_MSMT_LEADCHNL_RV_SENSING_INTR_AMPL: 7.12 MV
MDC_IDC_MSMT_LEADCHNL_RV_SENSING_INTR_AMPL: 7.12 MV
MDC_IDC_PG_IMPLANT_DTM: NORMAL
MDC_IDC_PG_MFG: NORMAL
MDC_IDC_PG_MODEL: NORMAL
MDC_IDC_PG_SERIAL: NORMAL
MDC_IDC_PG_TYPE: NORMAL
MDC_IDC_SESS_CLINIC_NAME: NORMAL
MDC_IDC_SESS_DTM: NORMAL
MDC_IDC_SESS_TYPE: NORMAL
MDC_IDC_SET_BRADY_AT_MODE_SWITCH_RATE: 171 {BEATS}/MIN
MDC_IDC_SET_BRADY_HYSTRATE: NORMAL
MDC_IDC_SET_BRADY_LOWRATE: 60 {BEATS}/MIN
MDC_IDC_SET_BRADY_MAX_SENSOR_RATE: 130 {BEATS}/MIN
MDC_IDC_SET_BRADY_MAX_TRACKING_RATE: 130 {BEATS}/MIN
MDC_IDC_SET_BRADY_MODE: NORMAL
MDC_IDC_SET_BRADY_PAV_DELAY_LOW: 180 MS
MDC_IDC_SET_BRADY_SAV_DELAY_LOW: 150 MS
MDC_IDC_SET_LEADCHNL_RA_PACING_AMPLITUDE: 1.5 V
MDC_IDC_SET_LEADCHNL_RA_PACING_ANODE_ELECTRODE_1: NORMAL
MDC_IDC_SET_LEADCHNL_RA_PACING_ANODE_LOCATION_1: NORMAL
MDC_IDC_SET_LEADCHNL_RA_PACING_CAPTURE_MODE: NORMAL
MDC_IDC_SET_LEADCHNL_RA_PACING_CATHODE_ELECTRODE_1: NORMAL
MDC_IDC_SET_LEADCHNL_RA_PACING_CATHODE_LOCATION_1: NORMAL
MDC_IDC_SET_LEADCHNL_RA_PACING_POLARITY: NORMAL
MDC_IDC_SET_LEADCHNL_RA_PACING_PULSEWIDTH: 0.4 MS
MDC_IDC_SET_LEADCHNL_RA_SENSING_ANODE_ELECTRODE_1: NORMAL
MDC_IDC_SET_LEADCHNL_RA_SENSING_ANODE_LOCATION_1: NORMAL
MDC_IDC_SET_LEADCHNL_RA_SENSING_CATHODE_ELECTRODE_1: NORMAL
MDC_IDC_SET_LEADCHNL_RA_SENSING_CATHODE_LOCATION_1: NORMAL
MDC_IDC_SET_LEADCHNL_RA_SENSING_POLARITY: NORMAL
MDC_IDC_SET_LEADCHNL_RA_SENSING_SENSITIVITY: 0.3 MV
MDC_IDC_SET_LEADCHNL_RV_PACING_AMPLITUDE: 2 V
MDC_IDC_SET_LEADCHNL_RV_PACING_ANODE_ELECTRODE_1: NORMAL
MDC_IDC_SET_LEADCHNL_RV_PACING_ANODE_LOCATION_1: NORMAL
MDC_IDC_SET_LEADCHNL_RV_PACING_CAPTURE_MODE: NORMAL
MDC_IDC_SET_LEADCHNL_RV_PACING_CATHODE_ELECTRODE_1: NORMAL
MDC_IDC_SET_LEADCHNL_RV_PACING_CATHODE_LOCATION_1: NORMAL
MDC_IDC_SET_LEADCHNL_RV_PACING_POLARITY: NORMAL
MDC_IDC_SET_LEADCHNL_RV_PACING_PULSEWIDTH: 0.4 MS
MDC_IDC_SET_LEADCHNL_RV_SENSING_ANODE_ELECTRODE_1: NORMAL
MDC_IDC_SET_LEADCHNL_RV_SENSING_ANODE_LOCATION_1: NORMAL
MDC_IDC_SET_LEADCHNL_RV_SENSING_CATHODE_ELECTRODE_1: NORMAL
MDC_IDC_SET_LEADCHNL_RV_SENSING_CATHODE_LOCATION_1: NORMAL
MDC_IDC_SET_LEADCHNL_RV_SENSING_POLARITY: NORMAL
MDC_IDC_SET_LEADCHNL_RV_SENSING_SENSITIVITY: 0.9 MV
MDC_IDC_SET_ZONE_DETECTION_INTERVAL: 350 MS
MDC_IDC_SET_ZONE_DETECTION_INTERVAL: 400 MS
MDC_IDC_SET_ZONE_TYPE: NORMAL
MDC_IDC_STAT_AT_BURDEN_PERCENT: 90.9 %
MDC_IDC_STAT_AT_DTM_END: NORMAL
MDC_IDC_STAT_AT_DTM_START: NORMAL
MDC_IDC_STAT_BRADY_AP_VP_PERCENT: 2.14 %
MDC_IDC_STAT_BRADY_AP_VS_PERCENT: 1.96 %
MDC_IDC_STAT_BRADY_AS_VP_PERCENT: 3.77 %
MDC_IDC_STAT_BRADY_AS_VS_PERCENT: 92.25 %
MDC_IDC_STAT_BRADY_DTM_END: NORMAL
MDC_IDC_STAT_BRADY_DTM_START: NORMAL
MDC_IDC_STAT_BRADY_RA_PERCENT_PACED: 0.85 %
MDC_IDC_STAT_BRADY_RV_PERCENT_PACED: 2.78 %
MDC_IDC_STAT_EPISODE_RECENT_COUNT: 0
MDC_IDC_STAT_EPISODE_RECENT_COUNT: 1
MDC_IDC_STAT_EPISODE_RECENT_COUNT: 1168
MDC_IDC_STAT_EPISODE_RECENT_COUNT_DTM_END: NORMAL
MDC_IDC_STAT_EPISODE_RECENT_COUNT_DTM_START: NORMAL
MDC_IDC_STAT_EPISODE_TOTAL_COUNT: 0
MDC_IDC_STAT_EPISODE_TOTAL_COUNT: 3608
MDC_IDC_STAT_EPISODE_TOTAL_COUNT: 7
MDC_IDC_STAT_EPISODE_TOTAL_COUNT_DTM_END: NORMAL
MDC_IDC_STAT_EPISODE_TOTAL_COUNT_DTM_START: NORMAL
MDC_IDC_STAT_EPISODE_TYPE: NORMAL

## 2021-06-04 VITALS — WEIGHT: 132.7 LBS | HEIGHT: 58 IN | BODY MASS INDEX: 27.85 KG/M2

## 2021-06-08 LAB
MDC_IDC_EPISODE_DTM: NORMAL
MDC_IDC_EPISODE_DURATION: 1 S
MDC_IDC_EPISODE_DURATION: 109 S
MDC_IDC_EPISODE_DURATION: 125 S
MDC_IDC_EPISODE_DURATION: 144 S
MDC_IDC_EPISODE_DURATION: 1547 S
MDC_IDC_EPISODE_DURATION: 18 S
MDC_IDC_EPISODE_DURATION: 181 S
MDC_IDC_EPISODE_DURATION: 182 S
MDC_IDC_EPISODE_DURATION: 182 S
MDC_IDC_EPISODE_DURATION: 184 S
MDC_IDC_EPISODE_DURATION: 184 S
MDC_IDC_EPISODE_DURATION: 2078 S
MDC_IDC_EPISODE_DURATION: 2243 S
MDC_IDC_EPISODE_DURATION: 273 S
MDC_IDC_EPISODE_DURATION: 273 S
MDC_IDC_EPISODE_DURATION: 2730 S
MDC_IDC_EPISODE_DURATION: 316 S
MDC_IDC_EPISODE_DURATION: 3261 S
MDC_IDC_EPISODE_DURATION: 3281 S
MDC_IDC_EPISODE_DURATION: 3316 S
MDC_IDC_EPISODE_DURATION: 383 S
MDC_IDC_EPISODE_DURATION: 48 S
MDC_IDC_EPISODE_DURATION: 6 S
MDC_IDC_EPISODE_DURATION: 6075 S
MDC_IDC_EPISODE_DURATION: 631 S
MDC_IDC_EPISODE_DURATION: 78 S
MDC_IDC_EPISODE_DURATION: 849 S
MDC_IDC_EPISODE_DURATION: NORMAL S
MDC_IDC_EPISODE_ID: 3722
MDC_IDC_EPISODE_ID: 3723
MDC_IDC_EPISODE_ID: 3724
MDC_IDC_EPISODE_ID: 3725
MDC_IDC_EPISODE_ID: 3726
MDC_IDC_EPISODE_ID: 3727
MDC_IDC_EPISODE_ID: 3728
MDC_IDC_EPISODE_ID: 3729
MDC_IDC_EPISODE_ID: 3730
MDC_IDC_EPISODE_ID: 3731
MDC_IDC_EPISODE_ID: 3732
MDC_IDC_EPISODE_ID: 3733
MDC_IDC_EPISODE_ID: 3734
MDC_IDC_EPISODE_ID: 3735
MDC_IDC_EPISODE_ID: 3736
MDC_IDC_EPISODE_ID: 3737
MDC_IDC_EPISODE_ID: 3738
MDC_IDC_EPISODE_ID: 3739
MDC_IDC_EPISODE_ID: 3740
MDC_IDC_EPISODE_ID: 3741
MDC_IDC_EPISODE_ID: 3742
MDC_IDC_EPISODE_ID: 3743
MDC_IDC_EPISODE_ID: 3744
MDC_IDC_EPISODE_ID: 3745
MDC_IDC_EPISODE_ID: 3746
MDC_IDC_EPISODE_ID: 3747
MDC_IDC_EPISODE_ID: 3748
MDC_IDC_EPISODE_ID: 3749
MDC_IDC_EPISODE_ID: 3750
MDC_IDC_EPISODE_ID: 3751
MDC_IDC_EPISODE_ID: 3752
MDC_IDC_EPISODE_ID: 3753
MDC_IDC_EPISODE_ID: 3754
MDC_IDC_EPISODE_ID: 3755
MDC_IDC_EPISODE_ID: 3756
MDC_IDC_EPISODE_ID: 3757
MDC_IDC_EPISODE_ID: 3758
MDC_IDC_EPISODE_TYPE: NORMAL
MDC_IDC_LEAD_IMPLANT_DT: NORMAL
MDC_IDC_LEAD_IMPLANT_DT: NORMAL
MDC_IDC_LEAD_LOCATION: NORMAL
MDC_IDC_LEAD_LOCATION: NORMAL
MDC_IDC_LEAD_LOCATION_DETAIL_1: NORMAL
MDC_IDC_LEAD_LOCATION_DETAIL_1: NORMAL
MDC_IDC_LEAD_MFG: NORMAL
MDC_IDC_LEAD_MFG: NORMAL
MDC_IDC_LEAD_MODEL: NORMAL
MDC_IDC_LEAD_MODEL: NORMAL
MDC_IDC_LEAD_POLARITY_TYPE: NORMAL
MDC_IDC_LEAD_POLARITY_TYPE: NORMAL
MDC_IDC_LEAD_SERIAL: NORMAL
MDC_IDC_LEAD_SERIAL: NORMAL
MDC_IDC_LEAD_SPECIAL_FUNCTION: NORMAL
MDC_IDC_LEAD_SPECIAL_FUNCTION: NORMAL
MDC_IDC_MSMT_BATTERY_DTM: NORMAL
MDC_IDC_MSMT_BATTERY_REMAINING_LONGEVITY: 160 MO
MDC_IDC_MSMT_BATTERY_RRT_TRIGGER: 2.62
MDC_IDC_MSMT_BATTERY_STATUS: NORMAL
MDC_IDC_MSMT_BATTERY_VOLTAGE: 3.03 V
MDC_IDC_MSMT_LEADCHNL_RA_IMPEDANCE_VALUE: 323 OHM
MDC_IDC_MSMT_LEADCHNL_RA_IMPEDANCE_VALUE: 418 OHM
MDC_IDC_MSMT_LEADCHNL_RA_PACING_THRESHOLD_AMPLITUDE: 0.38 V
MDC_IDC_MSMT_LEADCHNL_RA_PACING_THRESHOLD_PULSEWIDTH: 0.4 MS
MDC_IDC_MSMT_LEADCHNL_RA_SENSING_INTR_AMPL: 2.5 MV
MDC_IDC_MSMT_LEADCHNL_RA_SENSING_INTR_AMPL: 2.5 MV
MDC_IDC_MSMT_LEADCHNL_RV_IMPEDANCE_VALUE: 342 OHM
MDC_IDC_MSMT_LEADCHNL_RV_IMPEDANCE_VALUE: 437 OHM
MDC_IDC_MSMT_LEADCHNL_RV_PACING_THRESHOLD_AMPLITUDE: 0.62 V
MDC_IDC_MSMT_LEADCHNL_RV_PACING_THRESHOLD_PULSEWIDTH: 0.4 MS
MDC_IDC_MSMT_LEADCHNL_RV_SENSING_INTR_AMPL: 6.75 MV
MDC_IDC_MSMT_LEADCHNL_RV_SENSING_INTR_AMPL: 6.75 MV
MDC_IDC_PG_IMPLANT_DTM: NORMAL
MDC_IDC_PG_MFG: NORMAL
MDC_IDC_PG_MODEL: NORMAL
MDC_IDC_PG_SERIAL: NORMAL
MDC_IDC_PG_TYPE: NORMAL
MDC_IDC_SESS_CLINIC_NAME: NORMAL
MDC_IDC_SESS_DTM: NORMAL
MDC_IDC_SESS_TYPE: NORMAL
MDC_IDC_SET_BRADY_AT_MODE_SWITCH_RATE: 171 {BEATS}/MIN
MDC_IDC_SET_BRADY_HYSTRATE: NORMAL
MDC_IDC_SET_BRADY_LOWRATE: 60 {BEATS}/MIN
MDC_IDC_SET_BRADY_MAX_SENSOR_RATE: 130 {BEATS}/MIN
MDC_IDC_SET_BRADY_MAX_TRACKING_RATE: 130 {BEATS}/MIN
MDC_IDC_SET_BRADY_MODE: NORMAL
MDC_IDC_SET_BRADY_PAV_DELAY_LOW: 180 MS
MDC_IDC_SET_BRADY_SAV_DELAY_LOW: 150 MS
MDC_IDC_SET_LEADCHNL_RA_PACING_AMPLITUDE: 1.5 V
MDC_IDC_SET_LEADCHNL_RA_PACING_ANODE_ELECTRODE_1: NORMAL
MDC_IDC_SET_LEADCHNL_RA_PACING_ANODE_LOCATION_1: NORMAL
MDC_IDC_SET_LEADCHNL_RA_PACING_CAPTURE_MODE: NORMAL
MDC_IDC_SET_LEADCHNL_RA_PACING_CATHODE_ELECTRODE_1: NORMAL
MDC_IDC_SET_LEADCHNL_RA_PACING_CATHODE_LOCATION_1: NORMAL
MDC_IDC_SET_LEADCHNL_RA_PACING_POLARITY: NORMAL
MDC_IDC_SET_LEADCHNL_RA_PACING_PULSEWIDTH: 0.4 MS
MDC_IDC_SET_LEADCHNL_RA_SENSING_ANODE_ELECTRODE_1: NORMAL
MDC_IDC_SET_LEADCHNL_RA_SENSING_ANODE_LOCATION_1: NORMAL
MDC_IDC_SET_LEADCHNL_RA_SENSING_CATHODE_ELECTRODE_1: NORMAL
MDC_IDC_SET_LEADCHNL_RA_SENSING_CATHODE_LOCATION_1: NORMAL
MDC_IDC_SET_LEADCHNL_RA_SENSING_POLARITY: NORMAL
MDC_IDC_SET_LEADCHNL_RA_SENSING_SENSITIVITY: 0.3 MV
MDC_IDC_SET_LEADCHNL_RV_PACING_AMPLITUDE: 2 V
MDC_IDC_SET_LEADCHNL_RV_PACING_ANODE_ELECTRODE_1: NORMAL
MDC_IDC_SET_LEADCHNL_RV_PACING_ANODE_LOCATION_1: NORMAL
MDC_IDC_SET_LEADCHNL_RV_PACING_CAPTURE_MODE: NORMAL
MDC_IDC_SET_LEADCHNL_RV_PACING_CATHODE_ELECTRODE_1: NORMAL
MDC_IDC_SET_LEADCHNL_RV_PACING_CATHODE_LOCATION_1: NORMAL
MDC_IDC_SET_LEADCHNL_RV_PACING_POLARITY: NORMAL
MDC_IDC_SET_LEADCHNL_RV_PACING_PULSEWIDTH: 0.4 MS
MDC_IDC_SET_LEADCHNL_RV_SENSING_ANODE_ELECTRODE_1: NORMAL
MDC_IDC_SET_LEADCHNL_RV_SENSING_ANODE_LOCATION_1: NORMAL
MDC_IDC_SET_LEADCHNL_RV_SENSING_CATHODE_ELECTRODE_1: NORMAL
MDC_IDC_SET_LEADCHNL_RV_SENSING_CATHODE_LOCATION_1: NORMAL
MDC_IDC_SET_LEADCHNL_RV_SENSING_POLARITY: NORMAL
MDC_IDC_SET_LEADCHNL_RV_SENSING_SENSITIVITY: 0.9 MV
MDC_IDC_SET_ZONE_DETECTION_INTERVAL: 350 MS
MDC_IDC_SET_ZONE_DETECTION_INTERVAL: 400 MS
MDC_IDC_SET_ZONE_TYPE: NORMAL
MDC_IDC_STAT_BRADY_AP_VP_PERCENT: 0.93 %
MDC_IDC_STAT_BRADY_AP_VS_PERCENT: 0.14 %
MDC_IDC_STAT_BRADY_AS_VP_PERCENT: 0.03 %
MDC_IDC_STAT_BRADY_AS_VS_PERCENT: 99.04 %
MDC_IDC_STAT_BRADY_DTM_END: NORMAL
MDC_IDC_STAT_BRADY_DTM_START: NORMAL
MDC_IDC_STAT_BRADY_RA_PERCENT_PACED: 0.02 %
MDC_IDC_STAT_BRADY_RV_PERCENT_PACED: 0.66 %
MDC_IDC_STAT_EPISODE_RECENT_COUNT: 0
MDC_IDC_STAT_EPISODE_RECENT_COUNT: 1
MDC_IDC_STAT_EPISODE_RECENT_COUNT: 19
MDC_IDC_STAT_EPISODE_RECENT_COUNT_DTM_END: NORMAL
MDC_IDC_STAT_EPISODE_RECENT_COUNT_DTM_START: NORMAL
MDC_IDC_STAT_EPISODE_TOTAL_COUNT: 0
MDC_IDC_STAT_EPISODE_TOTAL_COUNT: 3608
MDC_IDC_STAT_EPISODE_TOTAL_COUNT: 7
MDC_IDC_STAT_EPISODE_TOTAL_COUNT_DTM_END: NORMAL
MDC_IDC_STAT_EPISODE_TOTAL_COUNT_DTM_START: NORMAL
MDC_IDC_STAT_EPISODE_TYPE: NORMAL

## 2021-06-12 NOTE — ANESTHESIA POSTPROCEDURE EVALUATION
Patient: Amelia Michel  Procedure(s):  RIGHT POSTERIOR TIBIAL TENDON LENGTHENING (Right)  RIGHT SUBTALAR JOINT FUSION AND CALCANEOCUBOID FUSION (Right)  Anesthesia type: general    Patient location: Phase II Recovery  Last vitals:   Vitals Value Taken Time   /88 10/20/20 1501   Temp 36.2  C (97.2  F) 10/20/20 1500   Pulse 78 10/20/20 1513   Resp 69 10/20/20 1513   SpO2 96 % 10/20/20 1513   Vitals shown include unvalidated device data.  Post vital signs: stable  Level of consciousness: awake and responds to simple questions  Post-anesthesia pain: pain controlled  Post-anesthesia nausea and vomiting: no  Pulmonary: unassisted, return to baseline  Cardiovascular: stable and blood pressure at baseline  Hydration: adequate  Anesthetic events: no    QCDR Measures:  ASA# 11 - Nan-op Cardiac Arrest: ASA11B - Patient did NOT experience unanticipated cardiac arrest  ASA# 12 - Nan-op Mortality Rate: ASA12B - Patient did NOT die  ASA# 13 - PACU Re-Intubation Rate: ASA13B - Patient did NOT require a new airway mgmt  ASA# 10 - Composite Anes Safety: ASA10A - No serious adverse event    Additional Notes:

## 2021-06-12 NOTE — ANESTHESIA PREPROCEDURE EVALUATION
Anesthesia Evaluation      Patient summary reviewed     Airway   Mallampati: III  Neck ROM: full   Pulmonary - negative ROS                          Cardiovascular   Exercise tolerance: > or = 4 METS  (+) pacemaker, hypertension, valvular problems/murmurs, dysrhythmias, ,     Rhythm: regular        Neuro/Psych    (+) neuromuscular disease,      Endo/Other    (+) arthritis,      GI/Hepatic/Renal - negative ROS      Other findings:     NPO > 8 hrs       COVID neg 10/16      ECHO 10/9/2020:    Interpretation Summary    VSD s/p repair in 1969    Global and regional left ventricular function is normal with an EF of 60-65%.    No flow acceleration is seen across the septum.    Global right ventricular function is normal. Mild right ventricular dilation    is present.    There is moderate tricuspid regurgitation.    The estimated PA systolic pressure is 32 mmHg but this is likely to be    underestimated due to the presence of multiple jets.    This study was compared with the study from 09/11/2017. There has been no    change in the severity of the tricuspid regurgitation or the RV size/function.       Dental    (+) lower dentures                       Anesthesia Plan  Planned anesthetic: peripheral nerve block and general endotracheal    ASA 4     Anesthetic plan and risks discussed with: patient  Anesthesia plan special considerations: antiemetics,   Post-op plan: routine recovery

## 2021-06-12 NOTE — ANESTHESIA PROCEDURE NOTES
Peripheral Block    Patient location during procedure: pre-op  Start time: 10/20/2020 11:44 AM  End time: 10/20/2020 11:48 AM  post-op analgesia per surgeon order as noted in medical record  Staffing:  Performing  Anesthesiologist: Amy Chang MD  Preanesthetic Checklist  Completed: patient identified, site marked, risks, benefits, and alternatives discussed, timeout performed, consent obtained, airway assessed, oxygen available, suction available, emergency drugs available and hand hygiene performed  Peripheral Block  Block type: sciatic, popliteal  Prep: ChloraPrep  Patient position: supine  Patient monitoring: cardiac monitor, continuous pulse oximetry, heart rate and blood pressure  Laterality: right  Injection technique: ultrasound guided    Ultrasound used to visualize needle placement in proximity to nerve being blocked: yes   US used to visualize anesthetic spread  Visualized anatomic structures normal  No Pathological Findings  Permanent ultrasound image captured for medical record  Sterile gel and probe cover used for ultrasound.  Needle  Needle type: echogenic   Needle gauge: 20G  Needle length: 4 in  no peripheral nerve catheter placed  Assessment  Injection assessment: no difficulty with injection, negative aspiration for heme, no paresthesia on injection and incremental injection  Additional Notes    Amy Chang MD  Anesthesiologist  [facility]

## 2021-06-12 NOTE — ANESTHESIA CARE TRANSFER NOTE
Last vitals:   Vitals:    10/20/20 1425   BP: 104/72   Pulse: (!) 123   Resp: 16   Temp: 36.4  C (97.6  F)   SpO2: 99%     Patient's level of consciousness is awake  Spontaneous respirations: yes  Maintains airway independently: yes  Dentition unchanged: yes  Oropharynx: oropharynx clear of all foreign objects    QCDR Measures:  ASA# 20 - Surgical Safety Checklist: WHO surgical safety checklist completed prior to induction    PQRS# 430 - Adult PONV Prevention: 4558F - Pt received => 2 anti-emetic agents (different classes) preop & intraop  ASA# 8 - Peds PONV Prevention: NA - Not pediatric patient, not GA or 2 or more risk factors NOT present  PQRS# 424 - Nan-op Temp Management: 4559F - At least one body temp DOCUMENTED => 35.5C or 95.9F within required timeframe  PQRS# 426 - PACU Transfer Protocol: - Transfer of care checklist used  ASA# 14 - Acute Post-op Pain: ASA14B - Patient did NOT experience pain >= 7 out of 10

## 2021-06-12 NOTE — ANESTHESIA PROCEDURE NOTES
Peripheral Block    Patient location during procedure: pre-op  Start time: 10/20/2020 11:48 AM  End time: 10/20/2020 11:49 AM  post-op analgesia per surgeon order as noted in medical record  Staffing:  Performing  Anesthesiologist: Amy Chang MD  Preanesthetic Checklist  Completed: patient identified, site marked, risks, benefits, and alternatives discussed, timeout performed, consent obtained, airway assessed, oxygen available, suction available, emergency drugs available and hand hygiene performed  Peripheral Block  Block type: saphenous, adductor canal block  Prep: ChloraPrep  Patient position: supine  Patient monitoring: cardiac monitor, continuous pulse oximetry, heart rate and blood pressure  Laterality: right  Injection technique: ultrasound guided    Ultrasound used to visualize needle placement in proximity to nerve being blocked: yes   US used to visualize anesthetic spread  Visualized anatomic structures normal  No Pathological Findings  Permanent ultrasound image captured for medical record  Sterile gel and probe cover used for ultrasound.  Needle  Needle type: echogenic   Needle gauge: 20G  Needle length: 4 in  no peripheral nerve catheter placed  Assessment  Injection assessment: no difficulty with injection, negative aspiration for heme, no paresthesia on injection and incremental injection  Additional Notes    Amy Chang MD  Anesthesiologist  [facility]

## 2021-06-16 ENCOUNTER — ANCILLARY PROCEDURE (OUTPATIENT)
Dept: CARDIOLOGY | Facility: CLINIC | Age: 61
End: 2021-06-16
Attending: NURSE PRACTITIONER
Payer: COMMERCIAL

## 2021-06-16 DIAGNOSIS — I47.19 ATRIAL TACHYCARDIA (H): ICD-10-CM

## 2021-06-16 PROCEDURE — 93306 TTE W/DOPPLER COMPLETE: CPT | Performed by: INTERNAL MEDICINE

## 2021-06-22 DIAGNOSIS — Z86.74 H/O CARDIAC ARREST: Primary | ICD-10-CM

## 2021-06-23 ENCOUNTER — OFFICE VISIT (OUTPATIENT)
Dept: CARDIOLOGY | Facility: CLINIC | Age: 61
End: 2021-06-23
Attending: NURSE PRACTITIONER
Payer: COMMERCIAL

## 2021-06-23 VITALS
SYSTOLIC BLOOD PRESSURE: 130 MMHG | OXYGEN SATURATION: 97 % | HEART RATE: 69 BPM | DIASTOLIC BLOOD PRESSURE: 90 MMHG | BODY MASS INDEX: 27.38 KG/M2 | WEIGHT: 131 LBS

## 2021-06-23 DIAGNOSIS — I50.33 ACUTE ON CHRONIC DIASTOLIC HEART FAILURE (H): ICD-10-CM

## 2021-06-23 DIAGNOSIS — I50.22 CHRONIC SYSTOLIC HEART FAILURE (H): Primary | ICD-10-CM

## 2021-06-23 DIAGNOSIS — I48.0 PAROXYSMAL ATRIAL FIBRILLATION (H): ICD-10-CM

## 2021-06-23 DIAGNOSIS — Z86.74 H/O CARDIAC ARREST: ICD-10-CM

## 2021-06-23 DIAGNOSIS — I50.22 CHRONIC SYSTOLIC HEART FAILURE (H): ICD-10-CM

## 2021-06-23 DIAGNOSIS — I47.19 ATRIAL TACHYCARDIA (H): ICD-10-CM

## 2021-06-23 LAB
ALBUMIN SERPL-MCNC: 4.2 G/DL (ref 3.4–5)
ALP SERPL-CCNC: 136 U/L (ref 40–150)
ALT SERPL W P-5'-P-CCNC: 41 U/L (ref 0–50)
ANION GAP SERPL CALCULATED.3IONS-SCNC: 6 MMOL/L (ref 3–14)
AST SERPL W P-5'-P-CCNC: 36 U/L (ref 0–45)
BILIRUB DIRECT SERPL-MCNC: 0.5 MG/DL (ref 0–0.2)
BILIRUB SERPL-MCNC: 1.2 MG/DL (ref 0.2–1.3)
BUN SERPL-MCNC: 25 MG/DL (ref 7–30)
CALCIUM SERPL-MCNC: 10 MG/DL (ref 8.5–10.1)
CHLORIDE SERPL-SCNC: 102 MMOL/L (ref 94–109)
CO2 SERPL-SCNC: 28 MMOL/L (ref 20–32)
CREAT SERPL-MCNC: 1.05 MG/DL (ref 0.52–1.04)
GFR SERPL CREATININE-BSD FRML MDRD: 57 ML/MIN/{1.73_M2}
GLUCOSE SERPL-MCNC: 98 MG/DL (ref 70–99)
POTASSIUM SERPL-SCNC: 4.2 MMOL/L (ref 3.4–5.3)
PROT SERPL-MCNC: 7.5 G/DL (ref 6.8–8.8)
SODIUM SERPL-SCNC: 136 MMOL/L (ref 133–144)

## 2021-06-23 PROCEDURE — 36415 COLL VENOUS BLD VENIPUNCTURE: CPT | Performed by: PATHOLOGY

## 2021-06-23 PROCEDURE — G0463 HOSPITAL OUTPT CLINIC VISIT: HCPCS

## 2021-06-23 PROCEDURE — 99214 OFFICE O/P EST MOD 30 MIN: CPT | Performed by: NURSE PRACTITIONER

## 2021-06-23 PROCEDURE — 80076 HEPATIC FUNCTION PANEL: CPT | Performed by: PATHOLOGY

## 2021-06-23 PROCEDURE — 80048 BASIC METABOLIC PNL TOTAL CA: CPT | Performed by: PATHOLOGY

## 2021-06-23 RX ORDER — LISINOPRIL 2.5 MG/1
2.5 TABLET ORAL DAILY
Qty: 90 TABLET | Refills: 3 | Status: SHIPPED | OUTPATIENT
Start: 2021-06-23 | End: 2022-06-22

## 2021-06-23 RX ORDER — SPIRONOLACTONE 25 MG/1
25 TABLET ORAL DAILY
Qty: 90 TABLET | Refills: 3 | Status: SHIPPED | OUTPATIENT
Start: 2021-06-23 | End: 2021-10-14

## 2021-06-23 RX ORDER — FUROSEMIDE 20 MG
20 TABLET ORAL DAILY
Qty: 90 TABLET | Refills: 3 | Status: SHIPPED | OUTPATIENT
Start: 2021-06-23 | End: 2022-07-07

## 2021-06-23 RX ORDER — DIGOXIN 125 MCG
125 TABLET ORAL DAILY
Qty: 90 TABLET | Refills: 3 | Status: SHIPPED | OUTPATIENT
Start: 2021-06-23 | End: 2022-07-07

## 2021-06-23 ASSESSMENT — ENCOUNTER SYMPTOMS
PALPITATIONS: 1
LEG PAIN: 1
MEMORY LOSS: 1
POLYDIPSIA: 0
ARTHRALGIAS: 1
WEIGHT GAIN: 1
HEADACHES: 0
LIGHT-HEADEDNESS: 0
SEIZURES: 0
LOSS OF CONSCIOUSNESS: 0
DECREASED APPETITE: 0
SYNCOPE: 0
EXERCISE INTOLERANCE: 1
CHILLS: 0
HYPOTENSION: 0
TREMORS: 0
HEMATURIA: 0
WEIGHT LOSS: 0
HYPERTENSION: 1
MUSCLE CRAMPS: 1
HALLUCINATIONS: 0
TINGLING: 1
SPEECH CHANGE: 1
POLYPHAGIA: 0
MYALGIAS: 1
DYSURIA: 0
DISTURBANCES IN COORDINATION: 1
SLEEP DISTURBANCES DUE TO BREATHING: 0
ORTHOPNEA: 0
DIFFICULTY URINATING: 0
DIZZINESS: 1
JOINT SWELLING: 1
PARALYSIS: 0
NUMBNESS: 1
BACK PAIN: 0
NIGHT SWEATS: 0
FEVER: 0
ALTERED TEMPERATURE REGULATION: 0
WEAKNESS: 1
NECK PAIN: 0
FLANK PAIN: 0
FATIGUE: 1
INCREASED ENERGY: 1
STIFFNESS: 1
MUSCLE WEAKNESS: 1

## 2021-06-23 ASSESSMENT — PAIN SCALES - GENERAL: PAINLEVEL: NO PAIN (0)

## 2021-06-23 NOTE — PATIENT INSTRUCTIONS
Take your medicines every day, as directed    Changes made today:  o Start lisinopril 2.5mg once daily     Monitor Your Weight and Symptoms    Contact us if you:      Gain 2 pounds in one day or 5 pounds in one week    Feel more short of breath    Notice more leg swelling    Feel lightheadeded   Change your lifestyle    Limit Salt or Sodium:    2000 mg  Limit Fluids:    2000 mL or approximately 64 ounces  Eat a Heart Healthy Diet    Low in saturated fats  Stay Active:    Aim to move at least 150 minutes every  week         To Contact us    During Business Hours:  [phone], option # 1 (University)  Then option # 4 (medical questions)     After hours, weekends or holidays:   [phone], Option #4  Ask to speak to the On-Call Cardiologist. Inform them you are a CORE/heart failure patient at the [place].     Use [employer] allows you to communicate directly with your heart team through secure messaging.    [employer] can be accessed any time on your phone, computer, or tablet.    If you need assistance, we'd be happy to help!         Keep your Heart Appointments:    Follow up with CORE clinic in 2 weeks with labs prior

## 2021-06-23 NOTE — LETTER
6/23/2021      RE: Amelia Michel  [address] [place] [address]       Dear Colleague,    Thank you for the opportunity to participate in the care of your patient, Amelia Michel, at the [facility] [place] at [facility]. Please see a copy of my visit note below.    HPI: 60 year old female pt presents for follow up in CORE clinic. She has a complicated cardiac history well outlined in the note by Halle Vasquez dated 5/26/21. This includes VSD repair in 1969; Atrial tachycarida on Eliquis, hx of VF arrest with normal coronaries;  SSS - S/P PPM, pericardial effusion, HTN. She is also being followed by Dr. Eloina Rodriguez for Diffuse large B cell lymphoma, as well as she has RA. Her last echo 6/16/21 shows LV EF of 50%; biatrial enlargement;  mod-severe tricuspid regurgitation. Previous EF (2017) was 40-45%     At her last core visit her atenolol was increased for both better rate control and HTN; as well her digoxin was resumed.  She states she feels much better - and has no symptoms that she is concerned about today. She states that she has tried going off digoxin in the past, but her rapid atrial fib has always returned off digoxin and would like it noted that she should stay on digoxin.     Pt denies SOB, orthopnea, PND, chest pain, belly bloating, loss of appetite, LE edema. Pt states energy level is good. Her wt is stable. Her diastolic BP is higher than what she would like.     PAST MEDICAL HISTORY:  Past Medical History:   Diagnosis Date     Arthritis      Cardiac arrest (H)      History of chemotherapy      Hypertension      Neuropathy        FAMILY HISTORY:  Family History   Problem Relation Age of Onset     Breast Cancer Mother      Hypertension Mother      Alzheimer Disease Mother      Cerebrovascular Disease Mother      Hypertension Father      Cerebrovascular Disease Father      Atrial fibrillation Father      Lymphoma Father       Prostate Cancer Father      Diabetes Paternal Grandmother      Alzheimer Disease Maternal Grandmother         likely     Arthritis Maternal Grandfather      Pneumonia Maternal Grandfather        SOCIAL HISTORY:  Social History     Socioeconomic History     Marital status:      Spouse name: Romeo Michel     Number of children: 0     Years of education: Not on file     Highest education level: Not on file   Occupational History     Employer: [employer]   Social Needs     Financial resource strain: Not on file     Food insecurity     Worry: Not on file     Inability: Not on file     Transportation needs     Medical: Not on file     Non-medical: Not on file   Tobacco Use     Smoking status: Never Smoker     Smokeless tobacco: Never Used   Substance and Sexual Activity     Alcohol use: Yes     Comment: none     Drug use: No     Sexual activity: Not on file   Lifestyle     Physical activity     Days per week: Not on file     Minutes per session: Not on file     Stress: Not on file   Relationships     Social connections     Talks on phone: Not on file     Gets together: Not on file     Attends [employer] service: Not on file     Active member of club or organization: Not on file     Attends meetings of clubs or organizations: Not on file     Relationship status: Not on file     Intimate partner violence     Fear of current or ex partner: Not on file     Emotionally abused: Not on file     Physically abused: Not on file     Forced sexual activity: Not on file   Other Topics Concern     Parent/sibling w/ CABG, MI or angioplasty before 65F 55M? No   Social History Narrative     Not on file       CURRENT MEDICATIONS:  Current Outpatient Medications   Medication Sig Dispense Refill     acetaminophen (TYLENOL) 500 MG tablet Take 500 mg by mouth every 6 hours as needed for mild pain       alendronate (FOSAMAX) 70 MG tablet Take 1 tablet (70 mg) by mouth every 7 days On Monday 12 tablet 0      apixaban ANTICOAGULANT (ELIQUIS ANTICOAGULANT) 5 MG tablet Take 1 tablet (5 mg) by mouth 2 times daily 180 tablet 1     atenolol (TENORMIN) 25 MG tablet 50 mg in AM and 25 mg in the evening 90 tablet 3     calcium carbonate 600 mg-vitamin D 400 units (CALTRATE) 600-400 MG-UNIT per tablet Take 2 tablets by mouth daily       digoxin (LANOXIN) 125 MCG tablet Take 1 tablet (125 mcg) by mouth daily 30 tablet 1     furosemide (LASIX) 20 MG tablet Take 1 tablet (20 mg) by mouth daily 90 tablet 1     gabapentin (NEURONTIN) 100 MG capsule TAKE 2 CAPSULES(200 MG) BY MOUTH THREE TIMES DAILY 540 capsule 1     hydroxychloroquine (PLAQUENIL) 200 MG tablet Take 1 tablet (200 mg) by mouth daily Overdue for annual eye exam 90 tablet 2     magnesium 250 MG tablet Take 1 tablet by mouth At Bedtime       multivitamin, therapeutic with minerals (THERA-VIT-M) TABS tablet Take 1 tablet by mouth daily 30 each 0     predniSONE (DELTASONE) 5 MG tablet Take 1 tablet (5 mg) by mouth daily 90 tablet 3     spironolactone (ALDACTONE) 25 MG tablet Take 1 tablet (25 mg) by mouth daily 90 tablet 3     STATIN NOT PRESCRIBED (INTENTIONAL) Please choose reason not prescribed, below (Patient not taking: Reported on 10/9/2020)       tolterodine ER (DETROL LA) 4 MG 24 hr capsule Take 1 capsule (4 mg) by mouth daily (Patient not taking: Reported on 5/26/2021) 90 capsule 3       ROS:   Constitutional: No fever, chills, or sweats. No weight gain/loss.   ENT: No visual disturbance, ear ache, epistaxis, sore throat.   Allergies/Immunologic: Negative.   Respiratory: No cough, hemoptysis.   Cardiovascular: As per HPI.   GI: No nausea, vomiting, hematemesis, melena, or hematochezia.   : No urinary frequency, dysuria, or hematuria.   Integument: Negative.   Psychiatric: Negative.   Neuro: Negative.   Endocrinology: Negative.   Musculoskeletal: Negative.    EXAM:  BP (!) 130/90 (BP Location: Right arm, Patient Position: Chair, Cuff Size: Adult Regular)   Pulse  69   Wt 59.4 kg (131 lb)   SpO2 97%   BMI 27.38 kg/m    General: appears comfortable, alert and articulate  Head: normocephalic, atraumatic  Eyes: anicteric sclera, EOMI  Neck: no adenopathy  Orophyarynx: moist mucosa, no lesions, dentition intact  Heart: regular, S1/S2,3/6 systolic  Murmur, no gallop, rub, estimated JVP 8cm   Lungs: clear, no rales or wheezing  Abdomen: soft, non-tender, bowel sounds present, no hepatosplenomegaly  Extremities: no clubbing, cyanosis or edema  Neurological: normal speech and affect, no gross motor deficits    Labs:      CMP RESULTS:  Lab Results   Component Value Date     06/23/2021    POTASSIUM 4.2 06/23/2021    CHLORIDE 102 06/23/2021    CO2 28 06/23/2021    ANIONGAP 6 06/23/2021    GLC 98 06/23/2021    BUN 25 06/23/2021    CR 1.05 (H) 06/23/2021    GFRESTIMATED 57 (L) 06/23/2021    GFRESTBLACK 67 06/23/2021    VIKTORIYA 10.0 06/23/2021    BILITOTAL 1.2 06/23/2021    ALBUMIN 4.2 06/23/2021    ALKPHOS 136 06/23/2021    ALT 41 06/23/2021    AST 36 06/23/2021        INR RESULTS:  Lab Results   Component Value Date    INR 3.42 (H) 03/07/2018       Lab Results   Component Value Date    MAG 1.9 04/12/2018     Lab Results   Component Value Date    NTBNPI 485 12/12/2019     Lab Results   Component Value Date    NTBNP 1,274 (H) 05/26/2021     ECHO: 6/16/21  Interpretation Summary  H/O VSD repair in 1969.  LVEF 50% based on biplane 2D tracing.  Diastolic function not assessed due to atrial fibrillation.  Global right ventricular function is mildly reduced.  Severe biatrial enlargement is present.  Moderate to severe tricuspid insufficiency is present.  Pulmonary artery systolic pressure is normal.  The inferior vena cava is normal.  No pericardial effusion is present.      Assessment and Plan:   1. Chronic systolic  heart failure secondary to atrial fib - with improvement in EF with better rate control .    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 HTN: suboptimal control - will add lisinopril 2.5 mg daily.     3. S/P VSD     4.Atrial Tachycardia - Has had DCCV but remains in atrial fib. Dig/Atenolol for rate control - [place] for a/c    5. Lymphoma: followed by Dr. Rodriguez  Follow-up 2 weeks in CORE      Patient Care Team:  Gala Stringer PA-C as PCP - General (Physician Assistant)  Lenore Rodriguez MD as MD (Oncology)  Archana Green PA-C as Physician Assistant (Physician Assistant)  Stacie Delgado, RN as Registered Nurse (Clinical Cardiac Electrophysiology)  Kevin Sheldon MD as MD (Plastic Surgery)  Eugene Hernandez, ABBEY (Cardiology)  Pao Cabrales, RN as Specialty Care Coordinator (Hematology & Oncology)  Kelly Rushing, RN as Specialty Care Coordinator (Cardiology)  Maribell Domínguez MD as Assigned Rheumatology Provider  Juan Eaton MD as Assigned Heart and Vascular Provider

## 2021-06-23 NOTE — PROGRESS NOTES
HPI: 60 year old female pt presents for follow up in [facility]. She has a complicated cardiac history well outlined in the note by Halle Vasquez dated 5/26/21. This includes VSD repair in 1969; Atrial tachycarida on Eliquis, hx of VF arrest with normal coronaries;  SSS - S/P PPM, pericardial effusion, HTN. She is also being followed by Dr. Eloina Rodriguez for Diffuse large B cell lymphoma, as well as she has RA. Her last echo 6/16/21 shows LV EF of 50%; biatrial enlargement;  mod-severe tricuspid regurgitation. Previous EF (2017) was 40-45%     At her last core visit her atenolol was increased for both better rate control and HTN; as well her digoxin was resumed.  She states she feels much better - and has no symptoms that she is concerned about today. She states that she has tried going off digoxin in the past, but her rapid atrial fib has always returned off digoxin and would like it noted that she should stay on digoxin.     Pt denies SOB, orthopnea, PND, chest pain, belly bloating, loss of appetite, LE edema. Pt states energy level is good. Her wt is stable. Her diastolic BP is higher than what she would like.     PAST MEDICAL HISTORY:  Past Medical History:   Diagnosis Date     Arthritis      Cardiac arrest (H)      History of chemotherapy      Hypertension      Neuropathy        FAMILY HISTORY:  Family History   Problem Relation Age of Onset     Breast Cancer Mother      Hypertension Mother      Alzheimer Disease Mother      Cerebrovascular Disease Mother      Hypertension Father      Cerebrovascular Disease Father      Atrial fibrillation Father      Lymphoma Father      Prostate Cancer Father      Diabetes Paternal Grandmother      Alzheimer Disease Maternal Grandmother         likely     Arthritis Maternal Grandfather      Pneumonia Maternal Grandfather        SOCIAL HISTORY:  Social History     Socioeconomic History     Marital status:      Spouse name: Romeo Michel     Number of children: 0      Years of education: Not on file     Highest education level: Not on file   Occupational History     Employer: [employer]   Social Needs     Financial resource strain: Not on file     Food insecurity     Worry: Not on file     Inability: Not on file     Transportation needs     Medical: Not on file     Non-medical: Not on file   Tobacco Use     Smoking status: Never Smoker     Smokeless tobacco: Never Used   Substance and Sexual Activity     Alcohol use: Yes     Comment: none     Drug use: No     Sexual activity: Not on file   Lifestyle     Physical activity     Days per week: Not on file     Minutes per session: Not on file     Stress: Not on file   Relationships     Social connections     Talks on phone: Not on file     Gets together: Not on file     Attends [employer] service: Not on file     Active member of club or organization: Not on file     Attends meetings of clubs or organizations: Not on file     Relationship status: Not on file     Intimate partner violence     Fear of current or ex partner: Not on file     Emotionally abused: Not on file     Physically abused: Not on file     Forced sexual activity: Not on file   Other Topics Concern     Parent/sibling w/ CABG, MI or angioplasty before 65F 55M? No   Social History Narrative     Not on file       CURRENT MEDICATIONS:  Current Outpatient Medications   Medication Sig Dispense Refill     acetaminophen (TYLENOL) 500 MG tablet Take 500 mg by mouth every 6 hours as needed for mild pain       alendronate (FOSAMAX) 70 MG tablet Take 1 tablet (70 mg) by mouth every 7 days On Monday 12 tablet 0     apixaban ANTICOAGULANT (ELIQUIS ANTICOAGULANT) 5 MG tablet Take 1 tablet (5 mg) by mouth 2 times daily 180 tablet 1     atenolol (TENORMIN) 25 MG tablet 50 mg in AM and 25 mg in the evening 90 tablet 3     calcium carbonate 600 mg-vitamin D 400 units (CALTRATE) 600-400 MG-UNIT per tablet Take 2 tablets by mouth daily       digoxin (LANOXIN) 125  MCG tablet Take 1 tablet (125 mcg) by mouth daily 30 tablet 1     furosemide (LASIX) 20 MG tablet Take 1 tablet (20 mg) by mouth daily 90 tablet 1     gabapentin (NEURONTIN) 100 MG capsule TAKE 2 CAPSULES(200 MG) BY MOUTH THREE TIMES DAILY 540 capsule 1     hydroxychloroquine (PLAQUENIL) 200 MG tablet Take 1 tablet (200 mg) by mouth daily Overdue for annual eye exam 90 tablet 2     magnesium 250 MG tablet Take 1 tablet by mouth At Bedtime       multivitamin, therapeutic with minerals (THERA-VIT-M) TABS tablet Take 1 tablet by mouth daily 30 each 0     predniSONE (DELTASONE) 5 MG tablet Take 1 tablet (5 mg) by mouth daily 90 tablet 3     spironolactone (ALDACTONE) 25 MG tablet Take 1 tablet (25 mg) by mouth daily 90 tablet 3     STATIN NOT PRESCRIBED (INTENTIONAL) Please choose reason not prescribed, below (Patient not taking: Reported on 10/9/2020)       tolterodine ER (DETROL LA) 4 MG 24 hr capsule Take 1 capsule (4 mg) by mouth daily (Patient not taking: Reported on 5/26/2021) 90 capsule 3       ROS:   Constitutional: No fever, chills, or sweats. No weight gain/loss.   ENT: No visual disturbance, ear ache, epistaxis, sore throat.   Allergies/Immunologic: Negative.   Respiratory: No cough, hemoptysis.   Cardiovascular: As per HPI.   GI: No nausea, vomiting, hematemesis, melena, or hematochezia.   : No urinary frequency, dysuria, or hematuria.   Integument: Negative.   Psychiatric: Negative.   Neuro: Negative.   Endocrinology: Negative.   Musculoskeletal: Negative.    EXAM:  BP (!) 130/90 (BP Location: Right arm, Patient Position: Chair, Cuff Size: Adult Regular)   Pulse 69   Wt 59.4 kg (131 lb)   SpO2 97%   BMI 27.38 kg/m    General: appears comfortable, alert and articulate  Head: normocephalic, atraumatic  Eyes: anicteric sclera, EOMI  Neck: no adenopathy  Orophyarynx: moist mucosa, no lesions, dentition intact  Heart: regular, S1/S2,3/6 systolic  Murmur, no gallop, rub, estimated JVP 8cm   Lungs: clear, no  rales or wheezing  Abdomen: soft, non-tender, bowel sounds present, no hepatosplenomegaly  Extremities: no clubbing, cyanosis or edema  Neurological: normal speech and affect, no gross motor deficits    Labs:      CMP RESULTS:  Lab Results   Component Value Date     06/23/2021    POTASSIUM 4.2 06/23/2021    CHLORIDE 102 06/23/2021    CO2 28 06/23/2021    ANIONGAP 6 06/23/2021    GLC 98 06/23/2021    BUN 25 06/23/2021    CR 1.05 (H) 06/23/2021    GFRESTIMATED 57 (L) 06/23/2021    GFRESTBLACK 67 06/23/2021    VIKTORIYA 10.0 06/23/2021    BILITOTAL 1.2 06/23/2021    ALBUMIN 4.2 06/23/2021    ALKPHOS 136 06/23/2021    ALT 41 06/23/2021    AST 36 06/23/2021        INR RESULTS:  Lab Results   Component Value Date    INR 3.42 (H) 03/07/2018       Lab Results   Component Value Date    MAG 1.9 04/12/2018     Lab Results   Component Value Date    NTBNPI 485 12/12/2019     Lab Results   Component Value Date    NTBNP 1,274 (H) 05/26/2021     ECHO: 6/16/21  Interpretation Summary  H/O VSD repair in 1969.  LVEF 50% based on biplane 2D tracing.  Diastolic function not assessed due to atrial fibrillation.  Global right ventricular function is mildly reduced.  Severe biatrial enlargement is present.  Moderate to severe tricuspid insufficiency is present.  Pulmonary artery systolic pressure is normal.  The inferior vena cava is normal.  No pericardial effusion is present.      Assessment and Plan:   1. Chronic systolic  heart failure secondary to atrial fib - with improvement in EF with better rate control .    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 HTN: suboptimal control - will add lisinopril 2.5 mg daily.     3. S/P VSD     4.Atrial Tachycardia - Has had DCCV but remains in atrial fib. Dig/Atenolol for rate control -  Agapito for a/c    5. Lymphoma: followed by Dr. Rodriguez  Follow-up 2 weeks in [facility]  Patient Care Team:  Gala Stringer PA-C as PCP - General (Physician Assistant)  Lenore Rodriguez MD as MD (Oncology)  Archana Green PA-C as Physician Assistant (Physician Assistant)  Stacie Delgado, RN as Registered Nurse (Clinical Cardiac Electrophysiology)  Kevin Sheldon MD as MD (Plastic Surgery)  Gala Stringer PA-C as Assigned PCP  Stacie Delgado, RN as Specialty Care Coordinator (Cardiology)  Eugene Hernandez, RN (Cardiology)  Pao Cabrales, ABBEY as Specialty Care Coordinator (Hematology & Oncology)  Kelly Rushing, ABBEY as Specialty Care Coordinator (Cardiology)  Lenore Rodriguez MD as Assigned Cancer Care Provider  Maribell Domínguez MD as Assigned Rheumatology Provider  Juan Eaton MD as Assigned Heart and Vascular Provider      Answers for HPI/ROS submitted by the patient on 6/23/2021   General Symptoms: Yes  Skin Symptoms: No  HENT Symptoms: No  EYE SYMPTOMS: No  HEART SYMPTOMS: Yes  LUNG SYMPTOMS: No  INTESTINAL SYMPTOMS: No  URINARY SYMPTOMS: Yes  GYNECOLOGIC SYMPTOMS: No  REPRODUCTIVE SYMPTOMS: No  BREAST SYMPTOMS: No  SKELETAL SYMPTOMS: Yes  BLOOD SYMPTOMS: No  NERVOUS SYSTEM SYMPTOMS: Yes  MENTAL HEALTH SYMPTOMS: No  Fever: No  Loss of appetite: No  Weight loss: No  Weight gain: Yes  Fatigue: Yes  Night sweats: No  Chills: No  Increased stress: No  Excessive hunger: No  Excessive thirst: No  Feeling hot or cold when others believe the temperature is normal: No  Loss of height: No  Post-operative complications: No  Surgical site pain: Yes  Hallucinations: No  Change in or Loss of Energy: Yes  Hyperactivity: No  Confusion: Yes  Chest pain or pressure: No  Fast or irregular heartbeat: Yes  Pain in legs with walking: Yes  Trouble breathing while lying down: No  Fingers or toes appear blue: No  High blood pressure: Yes  Low blood pressure: No  Fainting: No  Murmurs: Yes  Pacemaker:  Yes  Varicose veins: No  Edema or swelling: Yes  Wake up at night with shortness of breath: No  Light-headedness: No  Exercise intolerance: Yes  Trouble holding urine or incontinence: Yes  Pain or burning: No  Trouble starting or stopping: Yes  Increased frequency of urination: No  Blood in urine: No  Decreased frequency of urination: No  Frequent nighttime urination: Yes  Flank pain: No  Difficulty emptying bladder: No  Back pain: No  Muscle aches: Yes  Neck pain: No  Swollen joints: Yes  Joint pain: Yes  Bone pain: Yes  Muscle cramps: Yes  Muscle weakness: Yes  Joint stiffness: Yes  Bone fracture: No  Trouble with coordination: Yes  Dizziness or trouble with balance: Yes  Fainting or black-out spells: No  Memory loss: Yes  Headache: No  Seizures: No  Speech problems: Yes  Tingling: Yes  Tremor: No  Weakness: Yes  Difficulty walking: Yes  Paralysis: No  Numbness: Yes

## 2021-06-23 NOTE — NURSING NOTE
Diet: Patient instructed regarding a heart failure healthy diet, including discussion of reduced fat and 2000 mg daily sodium restriction, daily weights, medication purpose and compliance, fluid restrictions and resources for patient and family to access for assistance with heart failure management.       Labs: Patient was given results of the laboratory testing obtained today and patient was instructed about when to return for the next laboratory testing.     Med Reconcile: Reviewed and verified all current medications with the patient. The updated medication list was printed and given to the patient. START lisinopril 2.5mg once daily.    Return Appointment: Patient given instructions regarding scheduling next clinic visit. Follow up with [facility] in 2 weeks.     Patient stated she understood all health information given and agreed to call with further questions or concerns.     Nancy Mohamud RN

## 2021-06-23 NOTE — NURSING NOTE
Chief Complaint   Patient presents with     Follow Up     60 year old female with chronic diastolic heart failure presents for follow up with labs prior.      Vitals were taken and medications reconciled.    Wiley Pepper, EMT  4:23 PM

## 2021-07-03 NOTE — ADDENDUM NOTE
Addendum Note by Amy Chang MD at 10/20/2020  4:54 PM     Author: Amy Chang MD Service: -- Author Type: Physician    Filed: 10/20/2020  4:54 PM Date of Service: 10/20/2020  4:54 PM Status: Signed    : Amy Chang MD (Physician)       Addendum  created 10/20/20 1654 by Amy Chang MD    Clinical Note Signed

## 2021-07-15 DIAGNOSIS — I50.22 CHRONIC SYSTOLIC HEART FAILURE (H): Primary | ICD-10-CM

## 2021-07-20 ASSESSMENT — ENCOUNTER SYMPTOMS
LIGHT-HEADEDNESS: 0
INCREASED ENERGY: 1
SLEEP DISTURBANCES DUE TO BREATHING: 0
WEIGHT LOSS: 1
FEVER: 0
DYSURIA: 0
POLYPHAGIA: 0
ALTERED TEMPERATURE REGULATION: 0
HYPERTENSION: 1
HALLUCINATIONS: 0
HEMATURIA: 0
FLANK PAIN: 0
ARTHRALGIAS: 1
JOINT SWELLING: 1
MYALGIAS: 1
PALPITATIONS: 1
HYPOTENSION: 0
NIGHT SWEATS: 0
DECREASED APPETITE: 0
FATIGUE: 1
BACK PAIN: 0
MUSCLE WEAKNESS: 0
STIFFNESS: 1
CHILLS: 0
EXERCISE INTOLERANCE: 1
LEG PAIN: 0
SYNCOPE: 0
POLYDIPSIA: 0
ORTHOPNEA: 0
MUSCLE CRAMPS: 1
WEIGHT GAIN: 0
NECK PAIN: 0

## 2021-07-21 ENCOUNTER — LAB (OUTPATIENT)
Dept: LAB | Facility: CLINIC | Age: 61
End: 2021-07-21
Payer: COMMERCIAL

## 2021-07-21 ENCOUNTER — OFFICE VISIT (OUTPATIENT)
Dept: CARDIOLOGY | Facility: CLINIC | Age: 61
End: 2021-07-21
Attending: NURSE PRACTITIONER
Payer: COMMERCIAL

## 2021-07-21 VITALS
DIASTOLIC BLOOD PRESSURE: 78 MMHG | WEIGHT: 128 LBS | HEART RATE: 87 BPM | OXYGEN SATURATION: 96 % | SYSTOLIC BLOOD PRESSURE: 115 MMHG | BODY MASS INDEX: 27.61 KG/M2 | HEIGHT: 57 IN

## 2021-07-21 DIAGNOSIS — I50.22 CHRONIC SYSTOLIC HEART FAILURE (H): ICD-10-CM

## 2021-07-21 DIAGNOSIS — I48.0 PAROXYSMAL ATRIAL FIBRILLATION (H): ICD-10-CM

## 2021-07-21 DIAGNOSIS — I07.1 MODERATE TRICUSPID REGURGITATION: Primary | ICD-10-CM

## 2021-07-21 LAB
ANION GAP SERPL CALCULATED.3IONS-SCNC: 6 MMOL/L (ref 3–14)
BUN SERPL-MCNC: 29 MG/DL (ref 7–30)
CALCIUM SERPL-MCNC: 9.6 MG/DL (ref 8.5–10.1)
CHLORIDE BLD-SCNC: 103 MMOL/L (ref 94–109)
CO2 SERPL-SCNC: 28 MMOL/L (ref 20–32)
CREAT SERPL-MCNC: 1.02 MG/DL (ref 0.52–1.25)
GFR SERPL CREATININE-BSD FRML MDRD: 60 ML/MIN/1.73M2
GLUCOSE BLD-MCNC: 110 MG/DL (ref 70–99)
POTASSIUM BLD-SCNC: 3.9 MMOL/L (ref 3.4–5.3)
SODIUM SERPL-SCNC: 137 MMOL/L (ref 133–144)

## 2021-07-21 PROCEDURE — G0463 HOSPITAL OUTPT CLINIC VISIT: HCPCS

## 2021-07-21 PROCEDURE — 36415 COLL VENOUS BLD VENIPUNCTURE: CPT | Performed by: PATHOLOGY

## 2021-07-21 PROCEDURE — 80048 BASIC METABOLIC PNL TOTAL CA: CPT | Performed by: PATHOLOGY

## 2021-07-21 PROCEDURE — 99214 OFFICE O/P EST MOD 30 MIN: CPT | Performed by: NURSE PRACTITIONER

## 2021-07-21 PROCEDURE — 99207 PR ICM DEVICE INTERROGAT REMOTE: CPT | Performed by: NURSE PRACTITIONER

## 2021-07-21 ASSESSMENT — MIFFLIN-ST. JEOR: SCORE: 1026.06

## 2021-07-21 ASSESSMENT — PAIN SCALES - GENERAL: PAINLEVEL: NO PAIN (0)

## 2021-07-21 NOTE — PATIENT INSTRUCTIONS
Take your medicines every day, as directed    Changes made today:  o No med changes today    Monitor Your Weight and Symptoms    Contact us if you:      Gain 2 pounds in one day or 5 pounds in one week    Feel more short of breath    Notice more leg swelling    Feel lightheadeded   Change your lifestyle    Limit Salt or Sodium:    2000 mg  Limit Fluids:    2000 mL or approximately 64 ounces  Eat a Heart Healthy Diet    Low in saturated fats  Stay Active:    Aim to move at least 150 minutes every  week         To Contact us    During Business Hours:  [phone], option # 1 (University)  Then option # 4 (medical questions)     After hours, weekends or holidays:   [phone], Option #4  Ask to speak to the On-Call Cardiologist. Inform them you are a [facility]/heart failure patient at the [facility].     Use [employer] allows you to communicate directly with your heart team through secure messaging.    [employer] can be accessed any time on your phone, computer, or tablet.    If you need assistance, we'd be happy to help!         Keep your Heart Appointments:    Follow up with Dr. Eaton and Echo in 1 month.   Remote device check when you get home.

## 2021-07-21 NOTE — NURSING NOTE
Chief Complaint   Patient presents with     Follow Up     60 year old female with chronic diastolic heart failure presents for follow up with labs prior.      Vitals were taken and medications where reconciled.    Keaton Camacho, EMT  8:40 AM

## 2021-07-21 NOTE — PROGRESS NOTES
HPI:   Marilu is a 60 year old adult with a past medical history including VSD s/p repair 1969, RA, HTN, Insomnia, Atrial Tachyarrhythmias, cardiac arrest, SSS s/p PPM 12/19, DLBCL, and exudative pericardial effusion. She presents for routine CORE return.      Her cardiac history dates back to 5/17 with atrial tachyarrhythmias with LVEF 40-45%. She underwent repeat hospitalization 6/17 due to VF arrest in setting of normal angiogram and found to have DLBCL complicated by masses on the coronary cusps and LVOT. She underwent R-EPOCH one cycle and 5 subsequent cycles of R-CHOP completed in 12/17 with her course complicated by pericardial effusion treated with colcichine. She has struggled with recurrent arrhythmia issues. Prior Cardiac MRI from [id number] with normal EF. Since our last appointment she has undergone repeat Echo consistent with findings of HFPEF with EF 50% with adequately controlled HR, persistent moderate to severe TR.     She notes occasional palpitations with excessive heat. She notes persistent fatigue with mild improvement. He denies fever, chills, lightheadedness, dizziness, chest pain, palpitations, SOB, MONTALVO, PND, orthopnea, nausea, vomiting, diarrhea, hematochezia, melena, or LE edema. She notes weight loss and is actively trying at 128 lbs today. She continues to follow a low sodium diet. Her home BP log with BP /60-70's. She is able to ambulate approximately 500 feet prior to needing rest.     PAST MEDICAL HISTORY:  Past Medical History:   Diagnosis Date     Arthritis      Atrial tachycardia (H)      Cardiac arrest (H)      Cardiac arrest (H)      Critical illness myopathy      Diffuse large B-cell lymphoma (H)      Embolism and thrombosis of unspecified artery (H)      History of blood clots 2017    PICC line Right arm      History of chemotherapy      History of transfusion      Hx antineoplastic chemotherapy     lymphoma     Hypertension      Hypertension      Lymphedema of both lower  extremities      Neuropathy      Physical deconditioning      Positive PPD, 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     Thrombocytopenia (H)      VSD (ventricular septal defect)        FAMILY HISTORY:  Family History   Problem Relation Age of Onset     Breast Cancer Mother      Hypertension Mother      Alzheimer Disease Mother      Cerebrovascular Disease Mother      Hypertension Father      Cerebrovascular Disease Father      Atrial fibrillation Father      Lymphoma Father      Prostate Cancer Father      Diabetes Paternal Grandmother      Alzheimer Disease Maternal Grandmother         likely     Arthritis Maternal Grandfather      Pneumonia Maternal Grandfather        SOCIAL HISTORY:  Social History     Socioeconomic History     Marital status:      Spouse name: Romeo Michel     Number of children: 0     Years of education: Not on file     Highest education level: Not on file   Occupational History     Employer: [employer]   Tobacco Use     Smoking status: Never Smoker     Smokeless tobacco: Never Used   Substance and Sexual Activity     Alcohol use: Yes     Comment: none     Drug use: No     Sexual activity: Not on file   Other Topics Concern     Parent/sibling w/ CABG, MI or angioplasty before 65F 55M? No   Social History Narrative     Not on file     Social Determinants of Health     Financial Resource Strain:      Difficulty of Paying Living Expenses:    Food Insecurity:      Worried About Running Out of Food in the Last Year:      Ran Out of Food in the Last Year:    Transportation Needs:      Lack of Transportation (Medical):      Lack of Transportation (Non-Medical):    Physical Activity:      Days of Exercise per Week:      Minutes of Exercise per Session:    Stress:      Feeling of Stress :    Social Connections:      Frequency of Communication with Friends and Family:      Frequency of Social Gatherings with Friends and Family:      Attends Buddhism Services:      Active Member of Clubs or  Organizations:      Attends Club or Organization Meetings:      Marital Status:    Intimate Partner Violence:      Fear of Current or Ex-Partner:      Emotionally Abused:      Physically Abused:      Sexually Abused:        CURRENT MEDICATIONS:  Outpatient Medications Prior to Visit   Medication Sig Dispense Refill     acetaminophen (TYLENOL) 500 MG tablet Take 500 mg by mouth every 6 hours as needed for mild pain       alendronate (FOSAMAX) 70 MG tablet Take 1 tablet (70 mg) by mouth every 7 days On Monday 12 tablet 0     apixaban ANTICOAGULANT (ELIQUIS ANTICOAGULANT) 5 MG tablet Take 1 tablet (5 mg) by mouth 2 times daily 180 tablet 3     atenolol (TENORMIN) 25 MG tablet 50 mg in AM and 25 mg in the evening 90 tablet 3     calcium carbonate 600 mg-vitamin D 400 units (CALTRATE) 600-400 MG-UNIT per tablet Take 2 tablets by mouth daily       digoxin (LANOXIN) 125 MCG tablet Take 1 tablet (125 mcg) by mouth daily 90 tablet 3     furosemide (LASIX) 20 MG tablet Take 1 tablet (20 mg) by mouth daily 90 tablet 3     gabapentin (NEURONTIN) 100 MG capsule TAKE 2 CAPSULES(200 MG) BY MOUTH THREE TIMES DAILY 540 capsule 1     hydroxychloroquine (PLAQUENIL) 200 MG tablet Take 1 tablet (200 mg) by mouth daily Overdue for annual eye exam 90 tablet 2     lisinopril (ZESTRIL) 2.5 MG tablet Take 1 tablet (2.5 mg) by mouth daily 90 tablet 3     magnesium 250 MG tablet Take 1 tablet by mouth At Bedtime       multivitamin, therapeutic with minerals (THERA-VIT-M) TABS tablet Take 1 tablet by mouth daily 30 each 0     predniSONE (DELTASONE) 5 MG tablet Take 1 tablet (5 mg) by mouth daily 90 tablet 3     spironolactone (ALDACTONE) 25 MG tablet Take 1 tablet (25 mg) by mouth daily 90 tablet 3     STATIN NOT PRESCRIBED (INTENTIONAL) Please choose reason not prescribed, below (Patient not taking: Reported on 10/9/2020)       tolterodine ER (DETROL LA) 4 MG 24 hr capsule Take 1 capsule (4 mg) by mouth daily (Patient not taking: Reported on  "5/26/2021) 90 capsule 3     No facility-administered medications prior to visit.       ROS:   CONSTITUTIONAL: Denies fever, chills, fatigue, or weight fluctuations.   HEENT: Denies headache, vision changes, and changes in speech.   CV: Refer to HPI.   PULMONARY: Refer to HPI.   GI:Denies nausea, vomiting, diarrhea, and abdominal pain. Bowel movements are regular.   :Denies urinary alterations, dysuria, urinary frequency, hematuria, and abnormal drainage.   EXT:Denies lower extremity edema.   SKIN:Denies abnormal rashes or lesions.   MUSCULOSKELETAL:Denies upper or lower extremity weakness and pain.   NEUROLOGIC:Denies lightheadedness, dizziness, seizures, or upper or lower extremity paresthesia.     EXAM:  /78 (BP Location: Right arm, Patient Position: Chair, Cuff Size: Adult Regular)   Pulse 87   Ht 1.45 m (4' 9.1\")   Wt 58.1 kg (128 lb)   SpO2 96%   BMI 27.60 kg/m    GENERAL: Appears alert and oriented times three.   HEENT: Eye symmetrical and free of discharge bilaterally. Mucous membranes moist and without lesions.  NECK: Supple and without lymphadenopathy. JVD 10 cm.   CV: RRR, S1S2 present with grade III/VI murmur heard best at LSB.   RESPIRATORY: Respirations regular, even, and unlabored. Lungs CTA throughout.   GI: Soft and non distended with normoactive bowel sounds present in all quadrants. No tenderness, rebound, guarding. No organomegaly.   EXTREMITIES: No peripheral edema. 2+ bilateral pedal pulses.   NEUROLOGIC: Alert and orientated x 3. CN II-XII grossly intact. No focal deficits.   MUSCULOSKELETAL: No joint swelling or tenderness.   SKIN: No jaundice. No rashes or lesions.     Labs:  CBC RESULTS:  Lab Results   Component Value Date    WBC 5.6 03/25/2021    WBC Canceled, Test credited 03/16/2021    RBC 4.31 03/25/2021    RBC Canceled, Test credited 03/16/2021    HGB 15.4 03/25/2021    HGB Canceled, Test credited 03/16/2021    HCT 44.6 03/25/2021    HCT Canceled, Test credited 03/16/2021 "     (H) 03/25/2021    MCV Canceled, Test credited 03/16/2021    MCH 35.7 (H) 03/25/2021    MCH Canceled, Test credited 03/16/2021    MCHC 34.5 03/25/2021    MCHC Canceled, Test credited 03/16/2021    RDW 14.4 03/25/2021    RDW Canceled, Test credited 03/16/2021     (L) 03/25/2021    PLT Canceled, Test credited 03/16/2021       CMP RESULTS:  Lab Results   Component Value Date     07/21/2021     06/23/2021    POTASSIUM 3.9 07/21/2021    POTASSIUM 4.2 06/23/2021    CHLORIDE 103 07/21/2021    CHLORIDE 102 06/23/2021    CO2 28 07/21/2021    CO2 28 06/23/2021    ANIONGAP 6 07/21/2021    ANIONGAP 6 06/23/2021     (H) 07/21/2021    GLC 98 06/23/2021    BUN 29 07/21/2021    BUN 25 06/23/2021    CR 1.02 07/21/2021    CR 1.05 (H) 06/23/2021    GFRESTIMATED 60 (L) 07/21/2021    GFRESTIMATED 57 (L) 06/23/2021    GFRESTBLACK 67 06/23/2021    VIKTORIYA 9.6 07/21/2021    VIKTORIYA 10.0 06/23/2021    BILITOTAL 1.2 06/23/2021    ALBUMIN 4.2 06/23/2021    ALKPHOS 136 06/23/2021    ALT 41 06/23/2021    AST 36 06/23/2021        INR RESULTS:  Lab Results   Component Value Date    INR 3.42 (H) 03/07/2018       Lab Results   Component Value Date    MAG 1.9 04/12/2018     Lab Results   Component Value Date    NTBNPI 485 12/12/2019     Lab Results   Component Value Date    NTBNP 1,274 (H) 05/26/2021       Diagnostics:  Echo 10/9/20:   Interpretation Summary  VSD s/p repair in 1969  Global and regional left ventricular function is normal with an EF of 60-65%.  No flow acceleration is seen across the septum.  Global right ventricular function is normal. Mild right ventricular dilation  is present.  There is moderate tricuspid regurgitation.  The estimated PA systolic pressure is 32 mmHg but this is likely to be  underestimated due to the presence of multiple jets.  This study was compared with the study from 09/11/2017. There has been no  change in the severity of the tricuspid regurgitation or the RV  size/function.     Device check 5/26:  Device: Medtronic [id number] Claudia XT DR GALLEGOS  Normal device function  Mode: AAIR<=>DDDR 60 - 130 bpm  AP: 0.8%  : 2.8%  Intrinsic Rhythm: AFL w/ VS 85 - 125 bpm  Short V-V intervals: none  Lead Trends Appear Stable: yes  Estimated battery longevity to RRT = 13.3 years.   AF [place] = 90.9%  Anticoagulant: Eliquis  Ventricular Arrhythmia: no new episodes recorded since last remote transmission on 5/18/2021, 1 episode that was recorded as SVT on May 2, 2021 displays what appears to be VT lasting 21 sec @ 200 bpm. Reviewed with Dr. Eaton.   Setting Changes: none    Echo 6/16/21:   Interpretation Summary  H/O VSD repair in 1969.  LVEF 50% based on biplane 2D tracing.  Diastolic function not assessed due to atrial fibrillation.  Global right ventricular function is mildly reduced.  Severe biatrial enlargement is present.  Moderate to severe tricuspid insufficiency is present.  Pulmonary artery systolic pressure is normal.  The inferior vena cava is normal.  No pericardial effusion is present.     Assessment and Plan:   Marilu is a 60 year old adult with a past medical history including VSD s/p repair 1969, RA, HTN, Insomnia, Atrial Tachyarrhythmias, cardiac arrest, SSS s/p PPM 12/19, DLBCL, and exudative pericardial effusion. She presents to [facility] clinic for routine follow up with moderate to severe TR, euvolemic state, concern for intermittent uncontrolled palpitations, and controlled BP.     HFpEF. Note prior echo with reduced EF with uncontrolled HR likely inaccurate representation of EF. Findings on Echo with controlled HR consistent with HFpEF.   NYHA Class III, AHA/ACC Stage C  Rate control: Remote interrogation to be sent  volume status: Euvolemic  Blood pressure control: Controlled  Aldosterone antagonist: Aldactone 25 mg po daily   Evaluation of coronary arteries: 6/17 NCA per Angiogram    HTN. Intolerant to Metoprolol in the past.   - Atenolol 50 mg in AM and 25 mg in the  evening.   - Lisinopril 2.5 mg po daily      Aflutter. History of SSS s/p PPM 12/19. Long standing history of atrial arrythmias.  - She will send remote transition today.   - Continue Digoxin 125 mcg po daily.   - Atenolol as above.   - Continue Eliquis.   - Follow up with EP in 1 month.     VSD s/p repair.      History of exudative pericardial effusion.   - Stable per CT 3/19.    Moderate to severe TR. Identified on echo with right ventricular  systolic pressure is approximated at 22.8 mmHg plus the right atrial pressure.  - Repeat Echo at 3 month thiago, which is September to reassess.     Follow up Dr. Eaton and echo in 1 month.       Halle Vasquez, ELIZABETH CNP  7/21/2021          TAMY POWELL

## 2021-07-21 NOTE — LETTER
7/21/2021      RE: Amelia Michel  [address]  [facility] [address]       Dear Colleague,    Thank you for the opportunity to participate in the care of your patient, Amelia Michel, at the [facility] [place] [facility]. Please see a copy of my visit note below.    HPI:   Marilu is a 60 year old adult with a past medical history including VSD s/p repair 1969, RA, HTN, Insomnia, Atrial Tachyarrhythmias, cardiac arrest, SSS s/p PPM 12/19, DLBCL, and exudative pericardial effusion. She presents for routine CORE return.      Her cardiac history dates back to 5/17 with atrial tachyarrhythmias with LVEF 40-45%. She underwent repeat hospitalization 6/17 due to VF arrest in setting of normal angiogram and found to have DLBCL complicated by masses on the coronary cusps and LVOT. She underwent R-EPOCH one cycle and 5 subsequent cycles of R-CHOP completed in 12/17 with her course complicated by pericardial effusion treated with colcichine. She has struggled with recurrent arrhythmia issues. Prior Cardiac MRI from [id number] with normal EF. Since our last appointment she has undergone repeat Echo consistent with findings of HFPEF with EF 50% with adequately controlled HR, persistent moderate to severe TR.     She notes occasional palpitations with excessive heat. She notes persistent fatigue with mild improvement. He denies fever, chills, lightheadedness, dizziness, chest pain, palpitations, SOB, MONTALVO, PND, orthopnea, nausea, vomiting, diarrhea, hematochezia, melena, or LE edema. She notes weight loss and is actively trying at 128 lbs today. She continues to follow a low sodium diet. Her home BP log with BP /60-70's. She is able to ambulate approximately 500 feet prior to needing rest.     PAST MEDICAL HISTORY:  Past Medical History:   Diagnosis Date     Arthritis      Atrial tachycardia (H)      Cardiac arrest (H)      Cardiac arrest (H)       Critical illness myopathy      Diffuse large B-cell lymphoma (H)      Embolism and thrombosis of unspecified artery (H)      History of blood clots 2017    PICC line Right arm      History of chemotherapy      History of transfusion      Hx antineoplastic chemotherapy     lymphoma     Hypertension      Hypertension      Lymphedema of both lower extremities      Neuropathy      Physical deconditioning      Positive PPD, 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     Thrombocytopenia (H)      VSD (ventricular septal defect)        FAMILY HISTORY:  Family History   Problem Relation Age of Onset     Breast Cancer Mother      Hypertension Mother      Alzheimer Disease Mother      Cerebrovascular Disease Mother      Hypertension Father      Cerebrovascular Disease Father      Atrial fibrillation Father      Lymphoma Father      Prostate Cancer Father      Diabetes Paternal Grandmother      Alzheimer Disease Maternal Grandmother         likely     Arthritis Maternal Grandfather      Pneumonia Maternal Grandfather        SOCIAL HISTORY:  Social History     Socioeconomic History     Marital status:      Spouse name: Romeo Michel     Number of children: 0     Years of education: Not on file     Highest education level: Not on file   Occupational History     Employer: [employer]   Tobacco Use     Smoking status: Never Smoker     Smokeless tobacco: Never Used   Substance and Sexual Activity     Alcohol use: Yes     Comment: none     Drug use: No     Sexual activity: Not on file   Other Topics Concern     Parent/sibling w/ CABG, MI or angioplasty before 65F 55M? No   Social History Narrative     Not on file     Social Determinants of Health     Financial Resource Strain:      Difficulty of Paying Living Expenses:    Food Insecurity:      Worried About Running Out of Food in the Last Year:      Ran Out of Food in the Last Year:    Transportation Needs:      Lack of Transportation (Medical):      Lack of Transportation  (Non-Medical):    Physical Activity:      Days of Exercise per Week:      Minutes of Exercise per Session:    Stress:      Feeling of Stress :    Social Connections:      Frequency of Communication with Friends and Family:      Frequency of Social Gatherings with Friends and Family:      Attends [employer] Services:      Active Member of Clubs or Organizations:      Attends Club or Organization Meetings:      Marital Status:    Intimate Partner Violence:      Fear of Current or Ex-Partner:      Emotionally Abused:      Physically Abused:      Sexually Abused:        CURRENT MEDICATIONS:  Outpatient Medications Prior to Visit   Medication Sig Dispense Refill     acetaminophen (TYLENOL) 500 MG tablet Take 500 mg by mouth every 6 hours as needed for mild pain       alendronate (FOSAMAX) 70 MG tablet Take 1 tablet (70 mg) by mouth every 7 days On Monday 12 tablet 0     apixaban ANTICOAGULANT (ELIQUIS ANTICOAGULANT) 5 MG tablet Take 1 tablet (5 mg) by mouth 2 times daily 180 tablet 3     atenolol (TENORMIN) 25 MG tablet 50 mg in AM and 25 mg in the evening 90 tablet 3     calcium carbonate 600 mg-vitamin D 400 units (CALTRATE) 600-400 MG-UNIT per tablet Take 2 tablets by mouth daily       digoxin (LANOXIN) 125 MCG tablet Take 1 tablet (125 mcg) by mouth daily 90 tablet 3     furosemide (LASIX) 20 MG tablet Take 1 tablet (20 mg) by mouth daily 90 tablet 3     gabapentin (NEURONTIN) 100 MG capsule TAKE 2 CAPSULES(200 MG) BY MOUTH THREE TIMES DAILY 540 capsule 1     hydroxychloroquine (PLAQUENIL) 200 MG tablet Take 1 tablet (200 mg) by mouth daily Overdue for annual eye exam 90 tablet 2     lisinopril (ZESTRIL) 2.5 MG tablet Take 1 tablet (2.5 mg) by mouth daily 90 tablet 3     magnesium 250 MG tablet Take 1 tablet by mouth At Bedtime       multivitamin, therapeutic with minerals (THERA-VIT-M) TABS tablet Take 1 tablet by mouth daily 30 each 0     predniSONE (DELTASONE) 5 MG tablet Take 1 tablet (5 mg) by mouth daily 90  "tablet 3     spironolactone (ALDACTONE) 25 MG tablet Take 1 tablet (25 mg) by mouth daily 90 tablet 3     STATIN NOT PRESCRIBED (INTENTIONAL) Please choose reason not prescribed, below (Patient not taking: Reported on 10/9/2020)       tolterodine ER (DETROL LA) 4 MG 24 hr capsule Take 1 capsule (4 mg) by mouth daily (Patient not taking: Reported on 5/26/2021) 90 capsule 3     No facility-administered medications prior to visit.       ROS:   CONSTITUTIONAL: Denies fever, chills, fatigue, or weight fluctuations.   HEENT: Denies headache, vision changes, and changes in speech.   CV: Refer to HPI.   PULMONARY: Refer to HPI.   GI:Denies nausea, vomiting, diarrhea, and abdominal pain. Bowel movements are regular.   :Denies urinary alterations, dysuria, urinary frequency, hematuria, and abnormal drainage.   EXT:Denies lower extremity edema.   SKIN:Denies abnormal rashes or lesions.   MUSCULOSKELETAL:Denies upper or lower extremity weakness and pain.   NEUROLOGIC:Denies lightheadedness, dizziness, seizures, or upper or lower extremity paresthesia.     EXAM:  /78 (BP Location: Right arm, Patient Position: Chair, Cuff Size: Adult Regular)   Pulse 87   Ht 1.45 m (4' 9.1\")   Wt 58.1 kg (128 lb)   SpO2 96%   BMI 27.60 kg/m    GENERAL: Appears alert and oriented times three.   HEENT: Eye symmetrical and free of discharge bilaterally. Mucous membranes moist and without lesions.  NECK: Supple and without lymphadenopathy. JVD 10 cm.   CV: RRR, S1S2 present with grade III/VI murmur heard best at LSB.   RESPIRATORY: Respirations regular, even, and unlabored. Lungs CTA throughout.   GI: Soft and non distended with normoactive bowel sounds present in all quadrants. No tenderness, rebound, guarding. No organomegaly.   EXTREMITIES: No peripheral edema. 2+ bilateral pedal pulses.   NEUROLOGIC: Alert and orientated x 3. CN II-XII grossly intact. No focal deficits.   MUSCULOSKELETAL: No joint swelling or tenderness.   SKIN: No " jaundice. No rashes or lesions.     Labs:  CBC RESULTS:  Lab Results   Component Value Date    WBC 5.6 03/25/2021    WBC Canceled, Test credited 03/16/2021    RBC 4.31 03/25/2021    RBC Canceled, Test credited 03/16/2021    HGB 15.4 03/25/2021    HGB Canceled, Test credited 03/16/2021    HCT 44.6 03/25/2021    HCT Canceled, Test credited 03/16/2021     (H) 03/25/2021    MCV Canceled, Test credited 03/16/2021    MCH 35.7 (H) 03/25/2021    MCH Canceled, Test credited 03/16/2021    MCHC 34.5 03/25/2021    MCHC Canceled, Test credited 03/16/2021    RDW 14.4 03/25/2021    RDW Canceled, Test credited 03/16/2021     (L) 03/25/2021    PLT Canceled, Test credited 03/16/2021       CMP RESULTS:  Lab Results   Component Value Date     07/21/2021     06/23/2021    POTASSIUM 3.9 07/21/2021    POTASSIUM 4.2 06/23/2021    CHLORIDE 103 07/21/2021    CHLORIDE 102 06/23/2021    CO2 28 07/21/2021    CO2 28 06/23/2021    ANIONGAP 6 07/21/2021    ANIONGAP 6 06/23/2021     (H) 07/21/2021    GLC 98 06/23/2021    BUN 29 07/21/2021    BUN 25 06/23/2021    CR 1.02 07/21/2021    CR 1.05 (H) 06/23/2021    GFRESTIMATED 60 (L) 07/21/2021    GFRESTIMATED 57 (L) 06/23/2021    GFRESTBLACK 67 06/23/2021    VIKTORIYA 9.6 07/21/2021    VIKTORIYA 10.0 06/23/2021    BILITOTAL 1.2 06/23/2021    ALBUMIN 4.2 06/23/2021    ALKPHOS 136 06/23/2021    ALT 41 06/23/2021    AST 36 06/23/2021        INR RESULTS:  Lab Results   Component Value Date    INR 3.42 (H) 03/07/2018       Lab Results   Component Value Date    MAG 1.9 04/12/2018     Lab Results   Component Value Date    NTBNPI 485 12/12/2019     Lab Results   Component Value Date    NTBNP 1,274 (H) 05/26/2021       Diagnostics:  Echo 10/9/20:   Interpretation Summary  VSD s/p repair in 1969  Global and regional left ventricular function is normal with an EF of 60-65%.  No flow acceleration is seen across the septum.  Global right ventricular function is normal. Mild right ventricular  dilation  is present.  There is moderate tricuspid regurgitation.  The estimated PA systolic pressure is 32 mmHg but this is likely to be  underestimated due to the presence of multiple jets.  This study was compared with the study from 09/11/2017. There has been no  change in the severity of the tricuspid regurgitation or the RV size/function.     Device check 5/26:  Device: Medtronic [id number] Claudia XT DR GALLEGOS  Normal device function  Mode: AAIR<=>DDDR 60 - 130 bpm  AP: 0.8%  : 2.8%  Intrinsic Rhythm: AFL w/ VS 85 - 125 bpm  Short V-V intervals: none  Lead Trends Appear Stable: yes  Estimated battery longevity to RRT = 13.3 years.   AF [place] = 90.9%  Anticoagulant: Eliquis  Ventricular Arrhythmia: no new episodes recorded since last remote transmission on 5/18/2021, 1 episode that was recorded as SVT on May 2, 2021 displays what appears to be VT lasting 21 sec @ 200 bpm. Reviewed with Dr. Eaton.   Setting Changes: none    Echo 6/16/21:   Interpretation Summary  H/O VSD repair in 1969.  LVEF 50% based on biplane 2D tracing.  Diastolic function not assessed due to atrial fibrillation.  Global right ventricular function is mildly reduced.  Severe biatrial enlargement is present.  Moderate to severe tricuspid insufficiency is present.  Pulmonary artery systolic pressure is normal.  The inferior vena cava is normal.  No pericardial effusion is present.     Assessment and Plan:   Marilu is a 60 year old adult with a past medical history including VSD s/p repair 1969, RA, HTN, Insomnia, Atrial Tachyarrhythmias, cardiac arrest, SSS s/p PPM 12/19, DLBCL, and exudative pericardial effusion. She presents to CORE clinic for routine follow up with moderate to severe TR, euvolemic state, concern for intermittent uncontrolled palpitations, and controlled BP.     HFpEF. Note prior echo with reduced EF with uncontrolled HR likely inaccurate representation of EF. Findings on Echo with controlled HR consistent with HFpEF.   NYHA  Class III, AHA/ACC Stage C  Rate control: Remote interrogation to be sent  volume status: Euvolemic  Blood pressure control: Controlled  Aldosterone antagonist: Aldactone 25 mg po daily   Evaluation of coronary arteries: 6/17 NCA per Angiogram    HTN. Intolerant to Metoprolol in the past.   - Atenolol 50 mg in AM and 25 mg in the evening.   - Lisinopril 2.5 mg po daily      Aflutter. History of SSS s/p PPM 12/19. Long standing history of atrial arrythmias.  - She will send remote transition today.   - Continue Digoxin 125 mcg po daily.   - Atenolol as above.   - Continue Eliquis.   - Follow up with EP in 1 month.     VSD s/p repair.      History of exudative pericardial effusion.   - Stable per CT 3/19.    Moderate to severe TR. Identified on echo with right ventricular  systolic pressure is approximated at 22.8 mmHg plus the right atrial pressure.  - Repeat Echo at 3 month thiago, which is September to reassess.     Follow up Dr. Eaton and echo in 1 month.    Halle Vasquez, APRN CNP  7/21/2021      TAM[facility] POWELL

## 2021-08-13 DIAGNOSIS — M81.0 OSTEOPOROSIS, UNSPECIFIED OSTEOPOROSIS TYPE, UNSPECIFIED PATHOLOGICAL FRACTURE PRESENCE: ICD-10-CM

## 2021-08-13 DIAGNOSIS — Z79.52 LONG TERM (CURRENT) USE OF SYSTEMIC STEROIDS: ICD-10-CM

## 2021-08-13 RX ORDER — ALENDRONATE SODIUM 70 MG/1
TABLET ORAL
Qty: 12 TABLET | Refills: 0 | OUTPATIENT
Start: 2021-08-13

## 2021-08-13 NOTE — TELEPHONE ENCOUNTER
Routing refill request to provider for review/approval because:  PCP to determine refill    Conner Queen RN

## 2021-08-13 NOTE — TELEPHONE ENCOUNTER
Prescribed by rheumatology/Frederic Gongora. Another refill encounter was sent to her today.  Magda Stringer PA-C

## 2021-08-17 RX ORDER — ALENDRONATE SODIUM 70 MG/1
70 TABLET ORAL
Qty: 4 TABLET | Refills: 0 | Status: SHIPPED | OUTPATIENT
Start: 2021-08-17 | End: 2021-09-10

## 2021-08-17 NOTE — TELEPHONE ENCOUNTER
Last Clinic Visit: 9/11/2020 [facility] [place]  Future Visit: 9/9/2021

## 2021-08-24 ENCOUNTER — ANCILLARY PROCEDURE (OUTPATIENT)
Dept: CARDIOLOGY | Facility: CLINIC | Age: 61
End: 2021-08-24
Attending: INTERNAL MEDICINE
Payer: COMMERCIAL

## 2021-08-24 DIAGNOSIS — R00.1 BRADYCARDIA: ICD-10-CM

## 2021-08-24 PROCEDURE — 93294 REM INTERROG EVL PM/LDLS PM: CPT | Performed by: INTERNAL MEDICINE

## 2021-08-24 PROCEDURE — 93296 REM INTERROG EVL PM/IDS: CPT

## 2021-08-25 ENCOUNTER — ANCILLARY PROCEDURE (OUTPATIENT)
Dept: CARDIOLOGY | Facility: CLINIC | Age: 61
End: 2021-08-25
Attending: NURSE PRACTITIONER
Payer: COMMERCIAL

## 2021-08-25 ENCOUNTER — OFFICE VISIT (OUTPATIENT)
Dept: CARDIOLOGY | Facility: CLINIC | Age: 61
End: 2021-08-25
Attending: INTERNAL MEDICINE
Payer: COMMERCIAL

## 2021-08-25 VITALS
DIASTOLIC BLOOD PRESSURE: 78 MMHG | SYSTOLIC BLOOD PRESSURE: 115 MMHG | BODY MASS INDEX: 27.32 KG/M2 | HEART RATE: 81 BPM | WEIGHT: 126.7 LBS | OXYGEN SATURATION: 97 %

## 2021-08-25 DIAGNOSIS — I50.22 CHRONIC SYSTOLIC HEART FAILURE (H): ICD-10-CM

## 2021-08-25 DIAGNOSIS — I48.0 PAROXYSMAL ATRIAL FIBRILLATION (H): ICD-10-CM

## 2021-08-25 DIAGNOSIS — I07.1 MODERATE TRICUSPID REGURGITATION: ICD-10-CM

## 2021-08-25 LAB
LVEF ECHO: NORMAL
MDC_IDC_EPISODE_DTM: NORMAL
MDC_IDC_EPISODE_DURATION: 103 S
MDC_IDC_EPISODE_DURATION: 1046 S
MDC_IDC_EPISODE_DURATION: 19 S
MDC_IDC_EPISODE_DURATION: 19 S
MDC_IDC_EPISODE_DURATION: 1914 S
MDC_IDC_EPISODE_DURATION: 1929 S
MDC_IDC_EPISODE_DURATION: 357 S
MDC_IDC_EPISODE_DURATION: 37 S
MDC_IDC_EPISODE_DURATION: 40 S
MDC_IDC_EPISODE_DURATION: 44 S
MDC_IDC_EPISODE_DURATION: 44 S
MDC_IDC_EPISODE_DURATION: 489 S
MDC_IDC_EPISODE_DURATION: 52 S
MDC_IDC_EPISODE_DURATION: 61 S
MDC_IDC_EPISODE_DURATION: 65 S
MDC_IDC_EPISODE_DURATION: 81 S
MDC_IDC_EPISODE_DURATION: 94 S
MDC_IDC_EPISODE_DURATION: 987 S
MDC_IDC_EPISODE_DURATION: 99 S
MDC_IDC_EPISODE_DURATION: NORMAL S
MDC_IDC_EPISODE_ID: 3779
MDC_IDC_EPISODE_ID: 3780
MDC_IDC_EPISODE_ID: 3781
MDC_IDC_EPISODE_ID: 3782
MDC_IDC_EPISODE_ID: 3783
MDC_IDC_EPISODE_ID: 3784
MDC_IDC_EPISODE_ID: 3785
MDC_IDC_EPISODE_ID: 3786
MDC_IDC_EPISODE_ID: 3787
MDC_IDC_EPISODE_ID: 3788
MDC_IDC_EPISODE_ID: 3789
MDC_IDC_EPISODE_ID: 3790
MDC_IDC_EPISODE_ID: 3791
MDC_IDC_EPISODE_ID: 3792
MDC_IDC_EPISODE_ID: 3793
MDC_IDC_EPISODE_ID: 3794
MDC_IDC_EPISODE_ID: 3795
MDC_IDC_EPISODE_ID: 3796
MDC_IDC_EPISODE_ID: 3797
MDC_IDC_EPISODE_ID: 3798
MDC_IDC_EPISODE_ID: 3799
MDC_IDC_EPISODE_ID: 3800
MDC_IDC_EPISODE_ID: 3801
MDC_IDC_EPISODE_ID: 3802
MDC_IDC_EPISODE_ID: 3803
MDC_IDC_EPISODE_ID: 3804
MDC_IDC_EPISODE_ID: 3805
MDC_IDC_EPISODE_ID: 3806
MDC_IDC_EPISODE_ID: 3807
MDC_IDC_EPISODE_ID: 3808
MDC_IDC_EPISODE_ID: 3809
MDC_IDC_EPISODE_ID: 3810
MDC_IDC_EPISODE_ID: 3811
MDC_IDC_EPISODE_ID: 3812
MDC_IDC_EPISODE_ID: 3813
MDC_IDC_EPISODE_ID: 3814
MDC_IDC_EPISODE_ID: 3815
MDC_IDC_EPISODE_ID: 3816
MDC_IDC_EPISODE_ID: 3817
MDC_IDC_EPISODE_ID: 3818
MDC_IDC_EPISODE_ID: 3819
MDC_IDC_EPISODE_ID: 3820
MDC_IDC_EPISODE_ID: 3821
MDC_IDC_EPISODE_ID: 3822
MDC_IDC_EPISODE_ID: 3823
MDC_IDC_EPISODE_ID: 3824
MDC_IDC_EPISODE_ID: 3825
MDC_IDC_EPISODE_ID: 3826
MDC_IDC_EPISODE_ID: 3827
MDC_IDC_EPISODE_ID: 3828
MDC_IDC_EPISODE_ID: 3829
MDC_IDC_EPISODE_ID: 3830
MDC_IDC_EPISODE_TYPE: NORMAL
MDC_IDC_LEAD_IMPLANT_DT: NORMAL
MDC_IDC_LEAD_IMPLANT_DT: NORMAL
MDC_IDC_LEAD_LOCATION: NORMAL
MDC_IDC_LEAD_LOCATION: NORMAL
MDC_IDC_LEAD_LOCATION_DETAIL_1: NORMAL
MDC_IDC_LEAD_LOCATION_DETAIL_1: NORMAL
MDC_IDC_LEAD_MFG: NORMAL
MDC_IDC_LEAD_MFG: NORMAL
MDC_IDC_LEAD_MODEL: NORMAL
MDC_IDC_LEAD_MODEL: NORMAL
MDC_IDC_LEAD_POLARITY_TYPE: NORMAL
MDC_IDC_LEAD_POLARITY_TYPE: NORMAL
MDC_IDC_LEAD_SERIAL: NORMAL
MDC_IDC_LEAD_SERIAL: NORMAL
MDC_IDC_LEAD_SPECIAL_FUNCTION: NORMAL
MDC_IDC_LEAD_SPECIAL_FUNCTION: NORMAL
MDC_IDC_MSMT_BATTERY_DTM: NORMAL
MDC_IDC_MSMT_BATTERY_REMAINING_LONGEVITY: 155 MO
MDC_IDC_MSMT_BATTERY_RRT_TRIGGER: 2.62
MDC_IDC_MSMT_BATTERY_STATUS: NORMAL
MDC_IDC_MSMT_BATTERY_VOLTAGE: 3.02 V
MDC_IDC_MSMT_LEADCHNL_RA_IMPEDANCE_VALUE: 342 OHM
MDC_IDC_MSMT_LEADCHNL_RA_IMPEDANCE_VALUE: 399 OHM
MDC_IDC_MSMT_LEADCHNL_RA_PACING_THRESHOLD_AMPLITUDE: 0.38 V
MDC_IDC_MSMT_LEADCHNL_RA_PACING_THRESHOLD_PULSEWIDTH: 0.4 MS
MDC_IDC_MSMT_LEADCHNL_RA_SENSING_INTR_AMPL: 2.88 MV
MDC_IDC_MSMT_LEADCHNL_RA_SENSING_INTR_AMPL: 2.88 MV
MDC_IDC_MSMT_LEADCHNL_RV_IMPEDANCE_VALUE: 418 OHM
MDC_IDC_MSMT_LEADCHNL_RV_IMPEDANCE_VALUE: 494 OHM
MDC_IDC_MSMT_LEADCHNL_RV_PACING_THRESHOLD_AMPLITUDE: 0.5 V
MDC_IDC_MSMT_LEADCHNL_RV_PACING_THRESHOLD_PULSEWIDTH: 0.4 MS
MDC_IDC_MSMT_LEADCHNL_RV_SENSING_INTR_AMPL: 9.12 MV
MDC_IDC_MSMT_LEADCHNL_RV_SENSING_INTR_AMPL: 9.12 MV
MDC_IDC_PG_IMPLANT_DTM: NORMAL
MDC_IDC_PG_MFG: NORMAL
MDC_IDC_PG_MODEL: NORMAL
MDC_IDC_PG_SERIAL: NORMAL
MDC_IDC_PG_TYPE: NORMAL
MDC_IDC_SESS_CLINIC_NAME: NORMAL
MDC_IDC_SESS_DTM: NORMAL
MDC_IDC_SESS_TYPE: NORMAL
MDC_IDC_SET_BRADY_AT_MODE_SWITCH_RATE: 171 {BEATS}/MIN
MDC_IDC_SET_BRADY_HYSTRATE: NORMAL
MDC_IDC_SET_BRADY_LOWRATE: 60 {BEATS}/MIN
MDC_IDC_SET_BRADY_MAX_SENSOR_RATE: 130 {BEATS}/MIN
MDC_IDC_SET_BRADY_MAX_TRACKING_RATE: 130 {BEATS}/MIN
MDC_IDC_SET_BRADY_MODE: NORMAL
MDC_IDC_SET_BRADY_PAV_DELAY_LOW: 180 MS
MDC_IDC_SET_BRADY_SAV_DELAY_LOW: 150 MS
MDC_IDC_SET_LEADCHNL_RA_PACING_AMPLITUDE: 1.5 V
MDC_IDC_SET_LEADCHNL_RA_PACING_ANODE_ELECTRODE_1: NORMAL
MDC_IDC_SET_LEADCHNL_RA_PACING_ANODE_LOCATION_1: NORMAL
MDC_IDC_SET_LEADCHNL_RA_PACING_CAPTURE_MODE: NORMAL
MDC_IDC_SET_LEADCHNL_RA_PACING_CATHODE_ELECTRODE_1: NORMAL
MDC_IDC_SET_LEADCHNL_RA_PACING_CATHODE_LOCATION_1: NORMAL
MDC_IDC_SET_LEADCHNL_RA_PACING_POLARITY: NORMAL
MDC_IDC_SET_LEADCHNL_RA_PACING_PULSEWIDTH: 0.4 MS
MDC_IDC_SET_LEADCHNL_RA_SENSING_ANODE_ELECTRODE_1: NORMAL
MDC_IDC_SET_LEADCHNL_RA_SENSING_ANODE_LOCATION_1: NORMAL
MDC_IDC_SET_LEADCHNL_RA_SENSING_CATHODE_ELECTRODE_1: NORMAL
MDC_IDC_SET_LEADCHNL_RA_SENSING_CATHODE_LOCATION_1: NORMAL
MDC_IDC_SET_LEADCHNL_RA_SENSING_POLARITY: NORMAL
MDC_IDC_SET_LEADCHNL_RA_SENSING_SENSITIVITY: 0.3 MV
MDC_IDC_SET_LEADCHNL_RV_PACING_AMPLITUDE: 2 V
MDC_IDC_SET_LEADCHNL_RV_PACING_ANODE_ELECTRODE_1: NORMAL
MDC_IDC_SET_LEADCHNL_RV_PACING_ANODE_LOCATION_1: NORMAL
MDC_IDC_SET_LEADCHNL_RV_PACING_CAPTURE_MODE: NORMAL
MDC_IDC_SET_LEADCHNL_RV_PACING_CATHODE_ELECTRODE_1: NORMAL
MDC_IDC_SET_LEADCHNL_RV_PACING_CATHODE_LOCATION_1: NORMAL
MDC_IDC_SET_LEADCHNL_RV_PACING_POLARITY: NORMAL
MDC_IDC_SET_LEADCHNL_RV_PACING_PULSEWIDTH: 0.4 MS
MDC_IDC_SET_LEADCHNL_RV_SENSING_ANODE_ELECTRODE_1: NORMAL
MDC_IDC_SET_LEADCHNL_RV_SENSING_ANODE_LOCATION_1: NORMAL
MDC_IDC_SET_LEADCHNL_RV_SENSING_CATHODE_ELECTRODE_1: NORMAL
MDC_IDC_SET_LEADCHNL_RV_SENSING_CATHODE_LOCATION_1: NORMAL
MDC_IDC_SET_LEADCHNL_RV_SENSING_POLARITY: NORMAL
MDC_IDC_SET_LEADCHNL_RV_SENSING_SENSITIVITY: 0.9 MV
MDC_IDC_SET_ZONE_DETECTION_INTERVAL: 350 MS
MDC_IDC_SET_ZONE_DETECTION_INTERVAL: 400 MS
MDC_IDC_SET_ZONE_TYPE: NORMAL
MDC_IDC_STAT_AT_BURDEN_PERCENT: 100 %
MDC_IDC_STAT_AT_DTM_END: NORMAL
MDC_IDC_STAT_AT_DTM_START: NORMAL
MDC_IDC_STAT_BRADY_DTM_END: NORMAL
MDC_IDC_STAT_BRADY_DTM_START: NORMAL
MDC_IDC_STAT_BRADY_RA_PERCENT_PACED: 0.04 %
MDC_IDC_STAT_BRADY_RV_PERCENT_PACED: 49.29 %
MDC_IDC_STAT_EPISODE_RECENT_COUNT: 0
MDC_IDC_STAT_EPISODE_RECENT_COUNT: 1
MDC_IDC_STAT_EPISODE_RECENT_COUNT: 71
MDC_IDC_STAT_EPISODE_RECENT_COUNT_DTM_END: NORMAL
MDC_IDC_STAT_EPISODE_RECENT_COUNT_DTM_START: NORMAL
MDC_IDC_STAT_EPISODE_TOTAL_COUNT: 0
MDC_IDC_STAT_EPISODE_TOTAL_COUNT: 0
MDC_IDC_STAT_EPISODE_TOTAL_COUNT: 126
MDC_IDC_STAT_EPISODE_TOTAL_COUNT: 3679
MDC_IDC_STAT_EPISODE_TOTAL_COUNT: 7
MDC_IDC_STAT_EPISODE_TOTAL_COUNT_DTM_END: NORMAL
MDC_IDC_STAT_EPISODE_TOTAL_COUNT_DTM_START: NORMAL
MDC_IDC_STAT_EPISODE_TYPE: NORMAL

## 2021-08-25 PROCEDURE — 93306 TTE W/DOPPLER COMPLETE: CPT | Performed by: INTERNAL MEDICINE

## 2021-08-25 PROCEDURE — 99214 OFFICE O/P EST MOD 30 MIN: CPT | Mod: 25 | Performed by: INTERNAL MEDICINE

## 2021-08-25 NOTE — PROGRESS NOTES
Electrophysiology Clinic Note  HPI:   Ms. Michel is a 60 year old female who has a past medical history significant for VSD s/p repair 1969, RA, HTN, insomnia, atrial tachyarrhythmias, cardiac arrest, SSS s/p PPM 12/2019,  DLBCL, and exudative pericardial effusion.      She was admitted from 5/15/17-5/23/17 with atrial tachyarrhythmias (SVT, AF, AFL) with symptoms of palpitations, fatigue, and nausea. An echo showed LVEF 40-45%, mildly reduced RVEF, moderate biatrial enlargement, mild mitral regurgitation, and moderate tricuspid regurgitation. . She was started on Eliquis and underwent a BRE/DCCV on 5/17/17 c/b hypotension and recurrent arrhythmia.She was started on Sotalol and chemically cardioverted. However, she had nausea and thought it was from the Sotalol so this was stopped. She reverted back to AF/AFL and a rate control strategy was adopted which was difficult given symptoms so she started amiodarone. CMRI  showed LVEF 55%, mildly dilated RV with focal area of dyskinesis in RVOT, severe bi-atrial enlargement, severe TR, mild/moderate MR, and DE at RV septal insertion site. RFA was discussed but was defered as patient had a UTI at the time with ESBL.     She was readmitted on 6/19/17-8/4/17 after suffering an out of hospital cardiac arrest.  Upon EMS arrival, she was in VF. She was externally defibrillated and had ROSC in the field. She was intubated and brought to [facility] found to be in escape rhythm 43 bpm. She was taken emergently to cath lab where coronary angiogram showed normal coronary arteries. Temporary pacemaker was placed in RA given sinus arrest. She was also placed on therapeutic hypothermia. She had been having nausea, diarrhea, and intermittent vomiting over the last week and generally had not been feeling well over the last month and also had saddle anesthesia with lower extremity numbness that had been evaluated by neuro as an outpatient.  Ultimately found to have DLBCL started on chemo cycles. 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 Lymphoma and was eventually discharged to TCU on a lifevest.     Her DLBCL was treated with R-EPOCH for one cycle while in the hospital complicated by cytopenias followed by 5 cycles of R-CHOP finished the cycles in December of 2017 currently on surveillance scans. She had a pericardial effusion for which she is on colchicine which was discontinued at the end of 6/2018.      She has then followed intermittently with EP in clinic. She had some recurrent AF in 4/2018 requiring ED visit with DCCV with recurrence and another DCCV. We restarted digoxin 4/4/18 after which she had one episode of AF s/p DCCV and has since maintained sinus rhythm and reported good symptomatic control. She was seen in 5/2018 and reported having a feeling of her heart \"rolling\" daily lasting about about 10 sec at the most. The last time she required DCCV was in 4/12/2018. Her cMRI had shown some organization of her [ericardial effusion) and she has been initiated on colchicine. Chest CT in 9/2018 showed no evidence of lymphoma recurrence and improved pericardial effusion. Last EP follow up was in 2/2019 and she was doing well without issues.    She then presented 12/12/2019 with 1 day history palpitations, headaches, and nausea. She also endorses some dizziness when walking but not at rest. She was found to be bradycardic with intermittent junctional rhythm into 30's. Electrolytes and renal function stable. Her atenolol and digoxin were held. She continued to have junctional/bradycardia and decision was made to pursue PPM.    EP Visit 4/10/20: She presents today for follow up. She reports feeling well. She states she does have intermittent episodes of palpitations, that have not been overly bothersome to her. Device site well healed. She denies any chest pain/pressures, dizziness, lightheadedness, worsening shortness of breath, leg/ankle swelling, PND, " orthopnea, or syncopal symptoms. Device site is reported well healed. Device transmission shows presenting rhythm is AP/VS @ 60 bpm. 1 NSVT, 6 Fast A&V and 272 AF episodes recorded. All EGMs show AF/AT with RVR. AF burden= 75.3%. She is taking eliquis. Normal device function. AP= 26% and = 13%. No short V-V intervals recorded. Lead trends appear stable. Battery estimates 14.2 years to OZZY. Current cardiac medications include: Elqiuis, Atenolol, Digoxin, Lasix, and Spironolactone.     EP Visit 10/9/20: She presents today for follow up. She reports feeling well. She denies any chest pain/pressures, dizziness, lightheadedness, worsening shortness of breath, leg/ankle swelling, PND, orthopnea, palpitations, or syncopal symptoms. Device interrogation shows normal pacemaker function. 2 Fast A&V and 1 NSVT episodes recorded since last remote transmission. Intrinsic rhythm = Atrial Flutter, w/ VS @ 88 bpm. AF burden = 85.9%. Pt is on Eliquis. AP = 16.7%.  = 23.6%.  Estimated battery longevity to OZZY = 13.6 years. No short v-v intervals recorded. Lead trends appear stable. Presenting 12 lead ECG shows AF/AFL and  Vent Rate 69 bpm,  ms, QTc 473 ms. Echo shows LVEF 60-65%, mildly dilated RV with normal function, moderate TR, unchanged from prior echo. Current cardiac medications include: Elqiuis, Atenolol, Digoxin, Lasix, and Spironolactone.     She presents today for follow up. She had some more RVR and her digoxin was resumed. She has been having ongoing fatigue which is now improved with better rate control. She denies any chest pain/pressures, dizziness, lightheadedness, worsening shortness of breath, leg/ankle swelling, PND, orthopnea, palpitations, or syncopal symptoms. Device interrogations shows normal device function, stable lead parameters, intrinsic is AFL/VS/ at 60 bpm, HR histograms adequate, 100% AF/AFL since 11/2020, AP <0.1%,  49.3%. Echo today shows normal LVEF 60-65%, mildly decreased RV  function, moderate TI, and dilated IVC. Current cardiac medications include: Elqiuis, Atenolol, Digoxin, Lasix, and Spironolactone.         PAST MEDICAL HISTORY:  Past Medical History:   Diagnosis Date     Arthritis      Atrial tachycardia (H)      Cardiac arrest (H)      Cardiac arrest (H)      Critical illness myopathy      Diffuse large B-cell lymphoma (H)      Embolism and thrombosis of unspecified artery (H)      History of blood clots 2017    PICC line Right arm      History of chemotherapy      History of transfusion      Hx antineoplastic chemotherapy     lymphoma     Hypertension      Hypertension      Lymphedema of both lower extremities      Neuropathy      Physical deconditioning      Positive PPD, 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     Thrombocytopenia (H)      VSD (ventricular septal defect)        CURRENT MEDICATIONS:  Current Outpatient Medications   Medication Sig Dispense Refill     acetaminophen (TYLENOL) 500 MG tablet Take 500 mg by mouth every 6 hours as needed for mild pain       alendronate (FOSAMAX) 70 MG tablet Take 1 tablet (70 mg) by mouth every 7 days 4 tablet 0     apixaban ANTICOAGULANT (ELIQUIS ANTICOAGULANT) 5 MG tablet Take 1 tablet (5 mg) by mouth 2 times daily 180 tablet 3     atenolol (TENORMIN) 25 MG tablet 50 mg in AM and 25 mg in the evening 90 tablet 3     calcium carbonate 600 mg-vitamin D 400 units (CALTRATE) 600-400 MG-UNIT per tablet Take 2 tablets by mouth daily       digoxin (LANOXIN) 125 MCG tablet Take 1 tablet (125 mcg) by mouth daily 90 tablet 3     furosemide (LASIX) 20 MG tablet Take 1 tablet (20 mg) by mouth daily 90 tablet 3     gabapentin (NEURONTIN) 100 MG capsule TAKE 2 CAPSULES(200 MG) BY MOUTH THREE TIMES DAILY 540 capsule 1     hydroxychloroquine (PLAQUENIL) 200 MG tablet Take 1 tablet (200 mg) by mouth daily Overdue for annual eye exam 90 tablet 2     lisinopril (ZESTRIL) 2.5 MG tablet Take 1 tablet (2.5 mg) by mouth daily 90 tablet 3     magnesium 250 MG tablet Take 1 tablet  by mouth At Bedtime       multivitamin, therapeutic with minerals (THERA-VIT-M) TABS tablet Take 1 tablet by mouth daily 30 each 0     predniSONE (DELTASONE) 5 MG tablet Take 1 tablet (5 mg) by mouth daily 90 tablet 3     spironolactone (ALDACTONE) 25 MG tablet Take 1 tablet (25 mg) by mouth daily 90 tablet 3     STATIN NOT PRESCRIBED (INTENTIONAL) Please choose reason not prescribed, below (Patient not taking: Reported on 10/9/2020)       tolterodine ER (DETROL LA) 4 MG 24 hr capsule Take 1 capsule (4 mg) by mouth daily (Patient not taking: Reported on 5/26/2021) 90 capsule 3       PAST SURGICAL HISTORY:  Past Surgical History:   Procedure Laterality Date     ANESTHESIA CARDIOVERSION N/A 5/17/2017    Procedure: ANESTHESIA CARDIOVERSION;  ANESTHESIA CARDIOVERSION;  Surgeon: GENERIC ANESTHESIA PROVIDER;  [facility]     ANESTHESIA CARDIOVERSION  3/12/2018    Procedure: ANESTHESIA CARDIOVERSION;;  Surgeon: GENERIC ANESTHESIA PROVIDER;  [facility]     ANESTHESIA CARDIOVERSION N/A 3/23/2018    Procedure: ANESTHESIA CARDIOVERSION;  Anesthesia Cardioversion ;  Surgeon: GENERIC ANESTHESIA PROVIDER;  [facility]     ANESTHESIA CARDIOVERSION N/A 4/12/2018    Procedure: ANESTHESIA CARDIOVERSION;  Cardioversion;  Surgeon: GENERIC ANESTHESIA PROVIDER;  Location:  OR     ARTHRODESIS WRIST  2000    Right wrist     BONE MARROW ASPIRATE &BIOPSY  7/12/2017          BONE MARROW BIOPSY W/ ASPIRATION  07/12/2017     C FUSION FOOT BONES,SUBTALAR Right 10/20/2020    Procedure: RIGHT SUBTALAR JOINT FUSION AND CALCANEOCUBOID FUSION;  Surgeon: Nemesio Crow MD;  Location: [facility];  Service: Orthopedics     CARDIOVERSION      5/17/17, 3/12/18, 3/23/18, 4/14/18,      COLONOSCOPY N/A 11/19/2019    Procedure: Colonoscopy, With Polypectomy And Biopsy;  Surgeon: Pj Vasques MD;  Location: [facility]     EP PACEMAKER N/A 12/13/2019    Procedure: EP Pacemaker;  Surgeon: Pj Trent MD;  Location: [facility]  CARDIAC CATH LAB     EXCISE LESION UPPER EXTREMITY Left 5/22/2018    Procedure: EXCISE LESION UPPER EXTREMITY;  Left Arm Wound Excision And Closure, Possible Submuscular Transposition of Ulnar Nerve  (Choice Anesthesia);  Surgeon: Kevin Sheldon MD;  [facility]     FOOT SURGERY      4 left and 2 right     FOOT SURGERY      4 Left and 2 Right      IMPLANT PACEMAKER  12/13/2019    Dr China Trent  [facility]      IMPLANT PACEMAKER       RELEASE CARPAL TUNNEL Bilateral      RELEASE CARPAL TUNNEL BILATERAL       REPAIR VENTRICULAR SEPTAL DEFECT  1969     TRANSPOSITION ULNAR NERVE (ELBOW) Left 5/22/2018    Procedure: TRANSPOSITION ULNAR NERVE (ELBOW);;  Surgeon: Kevin Sheldon MD;  [facility]     ULNAR NERVE TRANSPOSITION Left 05/22/2018     VSD REPAIR  1969       ALLERGIES:     Allergies   Allergen Reactions     Amiodarone Other (See Comments) and Difficulty breathing     Lethargic and had trouble breathing- occurred in 2017     Blood Transfusion Related (Informational Only) Hives     Hives with platelets. Give benadryl premedication.     Metoprolol Other (See Comments)     Pt and  report that metoprolol does not work for her and also reports feeling unwell with this medication. She has been able to tolerate atenolol, which as worked in controlling her HR.      Other [No Clinical Screening - See Comments]      Penicillin Allergy Skin Test not performed, see antimicrobial management team progress note 7/5/17.       Penicillins      Tape [Adhesive Tape] Rash       FAMILY HISTORY:  Family History   Problem Relation Age of Onset     Breast Cancer Mother      Hypertension Mother      Alzheimer Disease Mother      Cerebrovascular Disease Mother      Hypertension Father      Cerebrovascular Disease Father      Atrial fibrillation Father      Lymphoma Father      Prostate Cancer Father      Diabetes Paternal Grandmother      Alzheimer Disease Maternal Grandmother         likely     Arthritis  Maternal Grandfather      Pneumonia Maternal Grandfather        SOCIAL HISTORY:  Social History     Tobacco Use     Smoking status: Never Smoker     Smokeless tobacco: Never Used   Substance Use Topics     Alcohol use: Yes     Comment: none     Drug use: No       ROS:   A comprehensive 10 point review of systems negative other than as mentioned in HPI.  Exam:  /78   Pulse 81   Wt 57.5 kg (126 lb 11.2 oz)   SpO2 97%   BMI 27.32 kg/m    GENERAL APPEARANCE: alert and no distress  HEENT: no icterus, no xanthelasmas, normal pupil size and reaction, normal palate, mucosa moist, no central cyanosis  NECK: Supple.  RESPIRATORY: lungs clear to auscultation - no rales, rhonchi or wheezes, no use of accessory muscles, no retractions, respirations are unlabored, normal respiratory rate  CARDIOVASCULAR:Irregular, soft murmur.  ABDOMEN: soft, non tender, bowel sounds normal, non-distended  EXTREMITIES: peripheral pulses normal, no edema  NEURO: alert and oriented to person/place/time, normal speech, gait and affect  SKIN: no ecchymoses, no rashes  PSYCH: normal affect, cooperative    Labs:  Reviewed.     Testing/Procedures:  9/11/17 ECHOCARDIOGRAM:   Interpretation Summary  Left ventricular function, chamber size, wall motion, and wall thickness are  normal.The EF is 60-65% (traced 60%.) GLS -18%.  The right ventricle is normal size and normal in function. The RV is mildly  dilated.  Moderate tricuspid insufficiency is present.  Mild pulmonary hypertension is present (esimated PASP 55 mmHg including RA  pressure.)  Dilation of the inferior vena cava is present with abnormal respiratory  variation in diameter (esitimated RAP 15 mmHg.)  This study was compared with the study from 8/31/17: TR appears slightly  decreased from the earlier study.     7/24/17 CMRI:     1. The LV is normal in cavity size and wall thickness. The global systolic function is normal. The LVEF is  64%. There are no regional wall motion  abnormalities. No evidence of myocardial iron overload.      2. The RV is normal in cavity size. The global systolic function is normal. The RVEF is 59%.      3. There is moderate dilation of bilateral atria.      4. Tricuspid regurgitation is present, difficult to quantify due to image quality.      5. Late gadolinium enhancement imaging shows RV insertion site hyperenhancement, a non-specific finding  seen in patients with RV volume/pressure overload.      6. There is a moderate right pleural effusion and small left pleural effusion.      CONCLUSIONS: Normal Bi-Ventricular systolic function, LVEF 64% and RVE 59%. There is no evidence of  infiltrative disease. Compared to the MRI from 5/15/2017, there is no significant change.      7/2017 BRE:  Interpretation Summary  There is a small mass (0.7 cm by 0.2 cm) on the ventricular side of the right  coronary cusp. There is a second mass (0.4 cm by 0.2 cm) observed in the LVOT,  attached to the mitro-aortic curtain. There is a third mass on the noncoronary  cusp that measures 0.4 cm by 0.2 cm that could be a healing vegetation. These  findings are compatible with endocarditis if clinical picture supports.  Right ventricular function, chamber size, wall motion, and thickness are  normal.  This study was compared with the study from 7/10/2017.  There is detection of masses on the aortic valve and LVOT.     4/12/18 CMRI:  1. The LV is normal in cavity size and wall thickness. The global systolic function is normal. The LVEF is  54%. There are no regional wall motion abnormalities.     2. The RV is mildly dilated in cavity size. The global systolic function is normal. The RVEF is 49%.      3. Both atria are severely enlarged.     4. There is at least moderate, possibly severe tricuspid regurgitation.      5. Late gadolinium enhancement imaging shows hyperenhancement in the RV insertion site consistent with RV  pressure/volume overload.      6. There is a loculated  pericardial effusion along the basal lateral and basal to mid inferior LV walls,  and along the inferior RV wall. it appears exudative in nature, and is beginning to organize. There is  diffuse pericardial enhancement.     7. There is no intracardiac thrombus or mass.     8. There is a small right pleural effusion.     9/26/18 CHEST CT:                                                                   IMPRESSION: In this patient with a history of lymphoma:  1. No evidence of disease recurrence, consistent with complete  response per Lugano criteria.  2. Stable cardiomegaly. Improved pericardial effusion.  3. Early contrast phase with heterogeneous visceral enhancement in the  abdomen, likely secondary to cardiac dysfunction.    10/9/20 ECHOCARDIOGRAM:  Interpretation Summary  VSD s/p repair in 1969  Global and regional left ventricular function is normal with an EF of 60-65%.  No flow acceleration is seen across the septum.  Global right ventricular function is normal. Mild right ventricular dilation  is present.  There is moderate tricuspid regurgitation.  The estimated PA systolic pressure is 32 mmHg but this is likely to be  underestimated due to the presence of multiple jets.  This study was compared with the study from 09/11/2017. There has been no  change in the severity of the tricuspid regurgitation or the RV size/function.  8/2021 ECHOCARDIOGRAM:  terpretation Summary  H/O of VSD repair in 1969  The visual ejection fraction is 60-65%. No wall motion abnormalities. Global  right ventricular function is mildly reduced.  Moderate tricuspid insufficiency is present.  There is a right ventricular right atrial pressure difference of 24mmHg.  IVC diameter >2.1 cm collapsing <50% with sniff suggests a high RA pressure  estimated at 15 mmHg or greater.  No pericardial effusion is present.  Assessment and Plan:   Ms. Michel is a 60 year old female who has a past medical history significant for VSD s/p repair 1969, RA,  HTN, insomnia, atrial tachyarrhythmias, cardiac arrest, SSS s/p PPM 12/2019,  DLBCL, and exudative pericardial effusion. She presents today for follow up. She had some more RVR and her digoxin was resumed. She has been having ongoing fatigue which is now improved with better rate control. She denies any chest pain/pressures, dizziness, lightheadedness, worsening shortness of breath, leg/ankle swelling, PND, orthopnea, palpitations, or syncopal symptoms. Device interrogations shows normal device function, stable lead parameters, intrinsic is AFL/VS/ at 60 bpm, HR histograms adequate, 100% AF/AFL since 11/2020, AP <0.1%,  49.3%. Echo today shows normal LVEF 60-65%, mildly decreased RV function, moderate TI, and dilated IVC. Current cardiac medications include: Elqiuis, Atenolol, Digoxin, Lasix, and Spironolactone.     Sick Sinus Syndrome:   Atrial Fibrillation:  1. Stroke Prophylaxis:  CHADSVASC=+HTN, +gender  2, corresponding to a 2.2% annual stroke / systemic emolism event rate. indicating need for long term oral anticoagulation.  She is on Eliquis. No bleeding issues.   2. Rate Control: Continue Atenolol. Had RVR off digoxin and is much better on digoxin, continue. Will get updated digoxin level.   3. Rhythm Control: Cardioversion, Antiarrhythmics and/or ablation are options for rhythm control. She has had several DCCV last in 4/12/19. Notably, she had possible side effects from Sotalol (however, likely was due to underlying illness at the time and not from medication since she was found to have DBCL).  She is currently in an AFL but is asymptomatic and LVEF preserved.   4. Had tachy-shelton syndrome and underwent PPM implant in 12/2019. Normal device function with stable lead parameters. She will have continued follow up with Device clinic per routine.     Follow up in 1 year with an echo.    The patient states understanding and is agreeable with plan.   Juan Eaton MD [facility]  Cardiology -  Electrophysiology

## 2021-08-25 NOTE — PATIENT INSTRUCTIONS
You were seen today in the Adult Congenital and Cardiovascular Genetics Clinic at the [facility].    Cardiology Providers you saw during your visit:  Juan Eaton MD    Diagnosis:  VSD    Results:  Juan Eaton MD reviewed the results of your echo testing today in clinic.    Recommendations:    1. Continue to eat a heart healthy, low salt diet.  2. Continue to get 20-30 minutes of aerobic activity, 4-5 days per week.  Examples of aerobic activity include walking, running, swimming, cycling, etc.  3. Continue to observe good oral hygiene, with regular dental visits      SBE prophylaxis:   Yes____  No__x__    Lifelong Bacterial Endocarditis Prophylaxis:  YES____  NO____    If YES is checked, follow the recommendations outlined below:  1. Take antibiotic(s) prior to recommended dental procedures and procedures on the respiratory tract or with infected skin, muscle or bones. SBE prophylaxis is not needed for routine GI and  procedures (ie. Colonoscopy or vaginal delivery)  2. Observe good oral hygiene daily, as advised by your dentist. Get regular professional dental care.  3. Keep cuts clean.  4. Infections should be treated promptly.  5. Symptoms of Infective Endocarditis could include: fever lasting more than 4-5 days or a recurrent fever that initially resolves but returns within 1-2 days)      Exercise restrictions:   Yes__X__  No____         If yes, list restrictions:  Must be allowed to rest if fatigued or SOB      Work restrictions:  Yes____  No_X___         If yes, list restrictions:    FASTING CHOLESTEROL was checked in the last 5 years YES__x_  NO___ (2000)  Continue to eat a heart healthy, low salt diet.         ____ Fasting lipid panel order today         ____ No changes in medications          ____ I recommend the following changes in your cholesterol medications.:          ____ Please follow up for cholesterol screening at your primary care physician      Follow-up:  Follow up with   Angelito in one year and labs.     If you have questions or concerns please contact us at:    Kelly Rushing, MSN, RN, CNL    Lesli Glaser (Scheduling)  Nurse Care Coordinator     Clinic   Adult Congenital and CV Genetics   Adult Congenital and CV Genetic  [facility]   [facility]  (P) [phone]     (P) [phone]  [email or web address]   (F) [phone]        For after hours urgent needs, call [phone] and ask to speak to the Adult Congenital Physician on call.  Mention Job Code 0401.    For emergencies call 911.    [facility]  [facility]   Clinics and [facility] Center  Mail Code [id number]  [address] [place]  [address]

## 2021-08-25 NOTE — LETTER
8/25/2021      RE: Amelia Michel  [address]  [facility] [address]       Dear Colleague,    Thank you for the opportunity to participate in the care of your patient, Amelia Michel, at the [facility] [place] at [facility]. Please see a copy of my visit note below.    Electrophysiology Clinic Note  HPI:   Ms. Michel is a 60 year old female who has a past medical history significant for VSD s/p repair 1969, RA, HTN, insomnia, atrial tachyarrhythmias, cardiac arrest, SSS s/p PPM 12/2019,  DLBCL, and exudative pericardial effusion.      She was admitted from 5/15/17-5/23/17 with atrial tachyarrhythmias (SVT, AF, AFL) with symptoms of palpitations, fatigue, and nausea. An echo showed LVEF 40-45%, mildly reduced RVEF, moderate biatrial enlargement, mild mitral regurgitation, and moderate tricuspid regurgitation. . She was started on Eliquis and underwent a BRE/DCCV on 5/17/17 c/b hypotension and recurrent arrhythmia.She was started on Sotalol and chemically cardioverted. However, she had nausea and thought it was from the Sotalol so this was stopped. She reverted back to AF/AFL and a rate control strategy was adopted which was difficult given symptoms so she started amiodarone. CMRI  showed LVEF 55%, mildly dilated RV with focal area of dyskinesis in RVOT, severe bi-atrial enlargement, severe TR, mild/moderate MR, and DE at RV septal insertion site. RFA was discussed but was defered as patient had a UTI at the time with ESBL.     She was readmitted on 6/19/17-8/4/17 after suffering an out of hospital cardiac arrest.  Upon EMS arrival, she was in VF. She was externally defibrillated and had ROSC in the field. She was intubated and brought to [facility] found to be in escape rhythm 43 bpm. She was taken emergently to cath lab where coronary angiogram showed normal coronary arteries. Temporary pacemaker was placed in RA given sinus arrest. She was also  "placed on therapeutic hypothermia. She had been having nausea, diarrhea, and intermittent vomiting over the last week and generally had not been feeling well over the last month and also had saddle anesthesia with lower extremity numbness that had been evaluated by neuro as an outpatient.  Ultimately found to have DLBCL started on chemo cycles. 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     Her DLBCL was treated with R-EPOCH for one cycle while in the hospital complicated by cytopenias followed by 5 cycles of R-CHOP finished the cycles in December of 2017 currently on surveillance scans. She had a pericardial effusion for which she is on colchicine which was discontinued at the end of 6/2018.      She has then followed intermittently with EP in clinic. She had some recurrent AF in 4/2018 requiring ED visit with DCCV with recurrence and another DCCV. We restarted digoxin 4/4/18 after which she had one episode of AF s/p DCCV and has since maintained sinus rhythm and reported good symptomatic control. She was seen in 5/2018 and reported having a feeling of her heart \"rolling\" daily lasting about about 10 sec at the most. The last time she required DCCV was in 4/12/2018. Her cMRI had shown some organization of her [ericardial effusion) and she has been initiated on colchicine. Chest CT in 9/2018 showed no evidence of lymphoma recurrence and improved pericardial effusion. Last EP follow up was in 2/2019 and she was doing well without issues.    She then presented 12/12/2019 with 1 day history palpitations, headaches, and nausea. She also endorses some dizziness when walking but not at rest. She was found to be bradycardic with intermittent junctional rhythm into 30's. Electrolytes and renal function stable. Her atenolol and digoxin were held. She continued to have junctional/bradycardia and decision was made to " pursue PPM.    EP Visit 4/10/20: She presents today for follow up. She reports feeling well. She states she does have intermittent episodes of palpitations, that have not been overly bothersome to her. Device site well healed. She denies any chest pain/pressures, dizziness, lightheadedness, worsening shortness of breath, leg/ankle swelling, PND, orthopnea, or syncopal symptoms. Device site is reported well healed. Device transmission shows presenting rhythm is AP/VS @ 60 bpm. 1 NSVT, 6 Fast A&V and 272 AF episodes recorded. All EGMs show AF/AT with RVR. AF burden= 75.3%. She is taking eliquis. Normal device function. AP= 26% and = 13%. No short V-V intervals recorded. Lead trends appear stable. Battery estimates 14.2 years to OZZY. Current cardiac medications include: Elqiuis, Atenolol, Digoxin, Lasix, and Spironolactone.     EP Visit 10/9/20: She presents today for follow up. She reports feeling well. She denies any chest pain/pressures, dizziness, lightheadedness, worsening shortness of breath, leg/ankle swelling, PND, orthopnea, palpitations, or syncopal symptoms. Device interrogation shows normal pacemaker function. 2 Fast A&V and 1 NSVT episodes recorded since last remote transmission. Intrinsic rhythm = Atrial Flutter, w/ VS @ 88 bpm. AF burden = 85.9%. Pt is on Eliquis. AP = 16.7%.  = 23.6%.  Estimated battery longevity to OZZY = 13.6 years. No short v-v intervals recorded. Lead trends appear stable. Presenting 12 lead ECG shows AF/AFL and  Vent Rate 69 bpm,  ms, QTc 473 ms. Echo shows LVEF 60-65%, mildly dilated RV with normal function, moderate TR, unchanged from prior echo. Current cardiac medications include: Elqiuis, Atenolol, Digoxin, Lasix, and Spironolactone.     She presents today for follow up. She had some more RVR and her digoxin was resumed. She has been having ongoing fatigue which is now improved with better rate control. She denies any chest pain/pressures, dizziness,  lightheadedness, worsening shortness of breath, leg/ankle swelling, PND, orthopnea, palpitations, or syncopal symptoms. Device interrogations shows normal device function, stable lead parameters, intrinsic is AFL/VS/ at 60 bpm, HR histograms adequate, 100% AF/AFL since 11/2020, AP <0.1%,  49.3%. Echo today shows normal LVEF 60-65%, mildly decreased RV function, moderate TI, and dilated IVC. Current cardiac medications include: Elqiuis, Atenolol, Digoxin, Lasix, and Spironolactone.         PAST MEDICAL HISTORY:  Past Medical History:   Diagnosis Date     Arthritis      Atrial tachycardia (H)      Cardiac arrest (H)      Cardiac arrest (H)      Critical illness myopathy      Diffuse large B-cell lymphoma (H)      Embolism and thrombosis of unspecified artery (H)      History of blood clots 2017    PICC line Right arm      History of chemotherapy      History of transfusion      Hx antineoplastic chemotherapy     lymphoma     Hypertension      Hypertension      Lymphedema of both lower extremities      Neuropathy      Physical deconditioning      Positive PPD, 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     Thrombocytopenia (H)      VSD (ventricular septal defect)        CURRENT MEDICATIONS:  Current Outpatient Medications   Medication Sig Dispense Refill     acetaminophen (TYLENOL) 500 MG tablet Take 500 mg by mouth every 6 hours as needed for mild pain       alendronate (FOSAMAX) 70 MG tablet Take 1 tablet (70 mg) by mouth every 7 days 4 tablet 0     apixaban ANTICOAGULANT (ELIQUIS ANTICOAGULANT) 5 MG tablet Take 1 tablet (5 mg) by mouth 2 times daily 180 tablet 3     atenolol (TENORMIN) 25 MG tablet 50 mg in AM and 25 mg in the evening 90 tablet 3     calcium carbonate 600 mg-vitamin D 400 units (CALTRATE) 600-400 MG-UNIT per tablet Take 2 tablets by mouth daily       digoxin (LANOXIN) 125 MCG tablet Take 1 tablet (125 mcg) by mouth daily 90 tablet 3     furosemide (LASIX) 20 MG tablet Take 1 tablet (20 mg) by mouth daily 90 tablet 3      gabapentin (NEURONTIN) 100 MG capsule TAKE 2 CAPSULES(200 MG) BY MOUTH THREE TIMES DAILY 540 capsule 1     hydroxychloroquine (PLAQUENIL) 200 MG tablet Take 1 tablet (200 mg) by mouth daily Overdue for annual eye exam 90 tablet 2     lisinopril (ZESTRIL) 2.5 MG tablet Take 1 tablet (2.5 mg) by mouth daily 90 tablet 3     magnesium 250 MG tablet Take 1 tablet by mouth At Bedtime       multivitamin, therapeutic with minerals (THERA-VIT-M) TABS tablet Take 1 tablet by mouth daily 30 each 0     predniSONE (DELTASONE) 5 MG tablet Take 1 tablet (5 mg) by mouth daily 90 tablet 3     spironolactone (ALDACTONE) 25 MG tablet Take 1 tablet (25 mg) by mouth daily 90 tablet 3     STATIN NOT PRESCRIBED (INTENTIONAL) Please choose reason not prescribed, below (Patient not taking: Reported on 10/9/2020)       tolterodine ER (DETROL LA) 4 MG 24 hr capsule Take 1 capsule (4 mg) by mouth daily (Patient not taking: Reported on 5/26/2021) 90 capsule 3       PAST SURGICAL HISTORY:  Past Surgical History:   Procedure Laterality Date     ANESTHESIA CARDIOVERSION N/A 5/17/2017    Procedure: ANESTHESIA CARDIOVERSION;  ANESTHESIA CARDIOVERSION;  Surgeon: GENERIC ANESTHESIA PROVIDER;  Location: [facility] OR     ANESTHESIA CARDIOVERSION  3/12/2018    Procedure: ANESTHESIA CARDIOVERSION;;  Surgeon: GENERIC ANESTHESIA PROVIDER;  Location: [facility] OR     ANESTHESIA CARDIOVERSION N/A 3/23/2018    Procedure: ANESTHESIA CARDIOVERSION;  Anesthesia Cardioversion ;  Surgeon: GENERIC ANESTHESIA PROVIDER;  Location: [facility] OR     ANESTHESIA CARDIOVERSION N/A 4/12/2018    Procedure: ANESTHESIA CARDIOVERSION;  Cardioversion;  Surgeon: GENERIC ANESTHESIA PROVIDER;  Location: [facility] OR     ARTHRODESIS WRIST  2000    Right wrist     BONE MARROW ASPIRATE &BIOPSY  7/12/2017          BONE MARROW BIOPSY W/ ASPIRATION  07/12/2017     C FUSION FOOT BONES,SUBTALAR Right 10/20/2020    Procedure: RIGHT SUBTALAR JOINT FUSION AND CALCANEOCUBOID FUSION;  Surgeon: Nemesio Crow MD;   Location: [facility] OR;  Service: Orthopedics     CARDIOVERSION      5/17/17, 3/12/18, 3/23/18, 4/14/18,      COLONOSCOPY N/A 11/19/2019    Procedure: Colonoscopy, With Polypectomy And Biopsy;  Surgeon: Pj Vasques MD;  Location: [facility]     EP PACEMAKER N/A 12/13/2019    Procedure: EP Pacemaker;  Surgeon: Pj Trent MD;  Location:  HEART CARDIAC CATH LAB     EXCISE LESION UPPER EXTREMITY Left 5/22/2018    Procedure: EXCISE LESION UPPER EXTREMITY;  Left Arm Wound Excision And Closure, Possible Submuscular Transposition of Ulnar Nerve  (Choice Anesthesia);  Surgeon: Kevin Sheldon MD;  Location:  OR     FOOT SURGERY      4 left and 2 right     FOOT SURGERY      4 Left and 2 Right      IMPLANT PACEMAKER  12/13/2019    Dr China Trent   [facility]      IMPLANT PACEMAKER       RELEASE CARPAL TUNNEL Bilateral      RELEASE CARPAL TUNNEL BILATERAL       REPAIR VENTRICULAR SEPTAL DEFECT  1969     TRANSPOSITION ULNAR NERVE (ELBOW) Left 5/22/2018    Procedure: TRANSPOSITION ULNAR NERVE (ELBOW);;  Surgeon: Kevin Sheldon MD;  [facility]     ULNAR NERVE TRANSPOSITION Left 05/22/2018     VSD REPAIR  1969       ALLERGIES:     Allergies   Allergen Reactions     Amiodarone Other (See Comments) and Difficulty breathing     Lethargic and had trouble breathing- occurred in 2017     Blood Transfusion Related (Informational Only) Hives     Hives with platelets. Give benadryl premedication.     Metoprolol Other (See Comments)     Pt and  report that metoprolol does not work for her and also reports feeling unwell with this medication. She has been able to tolerate atenolol, which as worked in controlling her HR.      Other [No Clinical Screening - See Comments]      Penicillin Allergy Skin Test not performed, see antimicrobial management team progress note 7/5/17.       Penicillins      Tape [Adhesive Tape] Rash       FAMILY HISTORY:  Family History   Problem Relation Age of Onset     Breast  Cancer Mother      Hypertension Mother      Alzheimer Disease Mother      Cerebrovascular Disease Mother      Hypertension Father      Cerebrovascular Disease Father      Atrial fibrillation Father      Lymphoma Father      Prostate Cancer Father      Diabetes Paternal Grandmother      Alzheimer Disease Maternal Grandmother         likely     Arthritis Maternal Grandfather      Pneumonia Maternal Grandfather        SOCIAL HISTORY:  Social History     Tobacco Use     Smoking status: Never Smoker     Smokeless tobacco: Never Used   Substance Use Topics     Alcohol use: Yes     Comment: none     Drug use: No       ROS:   A comprehensive 10 point review of systems negative other than as mentioned in HPI.  Exam:  /78   Pulse 81   Wt 57.5 kg (126 lb 11.2 oz)   SpO2 97%   BMI 27.32 kg/m    GENERAL APPEARANCE: alert and no distress  HEENT: no icterus, no xanthelasmas, normal pupil size and reaction, normal palate, mucosa moist, no central cyanosis  NECK: Supple.  RESPIRATORY: lungs clear to auscultation - no rales, rhonchi or wheezes, no use of accessory muscles, no retractions, respirations are unlabored, normal respiratory rate  CARDIOVASCULAR:Irregular, soft murmur.  ABDOMEN: soft, non tender, bowel sounds normal, non-distended  EXTREMITIES: peripheral pulses normal, no edema  NEURO: alert and oriented to person/place/time, normal speech, gait and affect  SKIN: no ecchymoses, no rashes  PSYCH: normal affect, cooperative    Labs:  Reviewed.     Testing/Procedures:  9/11/17 ECHOCARDIOGRAM:   Interpretation Summary  Left ventricular function, chamber size, wall motion, and wall thickness are  normal.The EF is 60-65% (traced 60%.) GLS -18%.  The right ventricle is normal size and normal in function. The RV is mildly  dilated.  Moderate tricuspid insufficiency is present.  Mild pulmonary hypertension is present (esimated PASP 55 mmHg including RA  pressure.)  Dilation of the inferior vena cava is present with  abnormal respiratory  variation in diameter (esitimated RAP 15 mmHg.)  This study was compared with the study from 8/31/17: TR appears slightly  decreased from the earlier study.     7/24/17 CMRI:     1. The LV is normal in cavity size and wall thickness. The global systolic function is normal. The LVEF is  64%. There are no regional wall motion abnormalities. No evidence of myocardial iron overload.      2. The RV is normal in cavity size. The global systolic function is normal. The RVEF is 59%.      3. There is moderate dilation of bilateral atria.      4. Tricuspid regurgitation is present, difficult to quantify due to image quality.      5. Late gadolinium enhancement imaging shows RV insertion site hyperenhancement, a non-specific finding  seen in patients with RV volume/pressure overload.      6. There is a moderate right pleural effusion and small left pleural effusion.      CONCLUSIONS: Normal Bi-Ventricular systolic function, LVEF 64% and RVE 59%. There is no evidence of  infiltrative disease. Compared to the MRI from 5/15/2017, there is no significant change.      7/2017 BRE:  Interpretation Summary  There is a small mass (0.7 cm by 0.2 cm) on the ventricular side of the right  coronary cusp. There is a second mass (0.4 cm by 0.2 cm) observed in the LVOT,  attached to the mitro-aortic curtain. There is a third mass on the noncoronary  cusp that measures 0.4 cm by 0.2 cm that could be a healing vegetation. These  findings are compatible with endocarditis if clinical picture supports.  Right ventricular function, chamber size, wall motion, and thickness are  normal.  This study was compared with the study from 7/10/2017.  There is detection of masses on the aortic valve and LVOT.     4/12/18 CMRI:  1. The LV is normal in cavity size and wall thickness. The global systolic function is normal. The LVEF is  54%. There are no regional wall motion abnormalities.     2. The RV is mildly dilated in cavity size. The  global systolic function is normal. The RVEF is 49%.      3. Both atria are severely enlarged.     4. There is at least moderate, possibly severe tricuspid regurgitation.      5. Late gadolinium enhancement imaging shows hyperenhancement in the RV insertion site consistent with RV  pressure/volume overload.      6. There is a loculated pericardial effusion along the basal lateral and basal to mid inferior LV walls,  and along the inferior RV wall. it appears exudative in nature, and is beginning to organize. There is  diffuse pericardial enhancement.     7. There is no intracardiac thrombus or mass.     8. There is a small right pleural effusion.     9/26/18 CHEST CT:                                                                   IMPRESSION: In this patient with a history of lymphoma:  1. No evidence of disease recurrence, consistent with complete  response per Lugano criteria.  2. Stable cardiomegaly. Improved pericardial effusion.  3. Early contrast phase with heterogeneous visceral enhancement in the  abdomen, likely secondary to cardiac dysfunction.    10/9/20 ECHOCARDIOGRAM:  Interpretation Summary  VSD s/p repair in 1969  Global and regional left ventricular function is normal with an EF of 60-65%.  No flow acceleration is seen across the septum.  Global right ventricular function is normal. Mild right ventricular dilation  is present.  There is moderate tricuspid regurgitation.  The estimated PA systolic pressure is 32 mmHg but this is likely to be  underestimated due to the presence of multiple jets.  This study was compared with the study from 09/11/2017. There has been no  change in the severity of the tricuspid regurgitation or the RV size/function.  8/2021 ECHOCARDIOGRAM:  terpretation Summary  H/O of VSD repair in 1969  The visual ejection fraction is 60-65%. No wall motion abnormalities. Global  right ventricular function is mildly reduced.  Moderate tricuspid insufficiency is present.  There is a  right ventricular right atrial pressure difference of 24mmHg.  IVC diameter >2.1 cm collapsing <50% with sniff suggests a high RA pressure  estimated at 15 mmHg or greater.  No pericardial effusion is present.  Assessment and Plan:   Ms. Michel is a 60 year old female who has a past medical history significant for VSD s/p repair 1969, RA, HTN, insomnia, atrial tachyarrhythmias, cardiac arrest, SSS s/p PPM 12/2019,  DLBCL, and exudative pericardial effusion. She presents today for follow up. She had some more RVR and her digoxin was resumed. She has been having ongoing fatigue which is now improved with better rate control. She denies any chest pain/pressures, dizziness, lightheadedness, worsening shortness of breath, leg/ankle swelling, PND, orthopnea, palpitations, or syncopal symptoms. Device interrogations shows normal device function, stable lead parameters, intrinsic is AFL/VS/ at 60 bpm, HR histograms adequate, 100% AF/AFL since 11/2020, AP <0.1%,  49.3%. Echo today shows normal LVEF 60-65%, mildly decreased RV function, moderate TI, and dilated IVC. Current cardiac medications include: Elqiuis, Atenolol, Digoxin, Lasix, and Spironolactone.     Sick Sinus Syndrome:   Atrial Fibrillation:  1. Stroke Prophylaxis:  CHADSVASC=+HTN, +gender  2, corresponding to a 2.2% annual stroke / systemic emolism event rate. indicating need for long term oral anticoagulation.  She is on Eliquis. No bleeding issues.   2. Rate Control: Continue Atenolol. Had RVR off digoxin and is much better on digoxin, continue. Will get updated digoxin level.   3. Rhythm Control: Cardioversion, Antiarrhythmics and/or ablation are options for rhythm control. She has had several DCCV last in 4/12/19. Notably, she had possible side effects from Sotalol (however, likely was due to underlying illness at the time and not from medication since she was found to have DBCL).  She is currently in an AFL but is asymptomatic and LVEF preserved.   4. Had  tachy-shelton syndrome and underwent PPM implant in 12/2019. Normal device function with stable lead parameters. She will have continued follow up with Device clinic per routine.     Follow up in 1 year with an echo.    The patient states understanding and is agreeable with plan.   Juan Eaton MD [facility]  Cardiology - Electrophysiology            Please do not hesitate to contact me if you have any questions/concerns.     Sincerely,     Juan Eaton MD

## 2021-08-25 NOTE — LETTER
8/25/2021      RE: Amelia Michel  [address]  [facility] [address]       Electrophysiology Clinic Note  HPI:   Ms. Michel is a 60 year old female who has a past medical history significant for VSD s/p repair 1969, RA, HTN, insomnia, atrial tachyarrhythmias, cardiac arrest, SSS s/p PPM 12/2019,  DLBCL, and exudative pericardial effusion.      She was admitted from 5/15/17-5/23/17 with atrial tachyarrhythmias (SVT, AF, AFL) with symptoms of palpitations, fatigue, and nausea. An echo showed LVEF 40-45%, mildly reduced RVEF, moderate biatrial enlargement, mild mitral regurgitation, and moderate tricuspid regurgitation. . She was started on Eliquis and underwent a BRE/DCCV on 5/17/17 c/b hypotension and recurrent arrhythmia.She was started on Sotalol and chemically cardioverted. However, she had nausea and thought it was from the Sotalol so this was stopped. She reverted back to AF/AFL and a rate control strategy was adopted which was difficult given symptoms so she started amiodarone. CMRI  showed LVEF 55%, mildly dilated RV with focal area of dyskinesis in RVOT, severe bi-atrial enlargement, severe TR, mild/moderate MR, and DE at RV septal insertion site. RFA was discussed but was defered as patient had a UTI at the time with ESBL.     She was readmitted on 6/19/17-8/4/17 after suffering an out of hospital cardiac arrest.  Upon EMS arrival, she was in VF. She was externally defibrillated and had ROSC in the field. She was intubated and brought to [facility] found to be in escape rhythm 43 bpm. She was taken emergently to cath lab where coronary angiogram showed normal coronary arteries. Temporary pacemaker was placed in RA given sinus arrest. She was also placed on therapeutic hypothermia. She had been having nausea, diarrhea, and intermittent vomiting over the last week and generally had not been feeling well over the last month and also had saddle anesthesia with lower extremity numbness that had been evaluated  "by neuro as an outpatient.  Ultimately found to have DLBCL started on chemo cycles. 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     Her DLBCL was treated with R-EPOCH for one cycle while in the hospital complicated by cytopenias followed by 5 cycles of R-CHOP finished the cycles in December of 2017 currently on surveillance scans. She had a pericardial effusion for which she is on colchicine which was discontinued at the end of 6/2018.      She has then followed intermittently with EP in clinic. She had some recurrent AF in 4/2018 requiring ED visit with DCCV with recurrence and another DCCV. We restarted digoxin 4/4/18 after which she had one episode of AF s/p DCCV and has since maintained sinus rhythm and reported good symptomatic control. She was seen in 5/2018 and reported having a feeling of her heart \"rolling\" daily lasting about about 10 sec at the most. The last time she required DCCV was in 4/12/2018. Her cMRI had shown some organization of her [ericardial effusion) and she has been initiated on colchicine. Chest CT in 9/2018 showed no evidence of lymphoma recurrence and improved pericardial effusion. Last EP follow up was in 2/2019 and she was doing well without issues.    She then presented 12/12/2019 with 1 day history palpitations, headaches, and nausea. She also endorses some dizziness when walking but not at rest. She was found to be bradycardic with intermittent junctional rhythm into 30's. Electrolytes and renal function stable. Her atenolol and digoxin were held. She continued to have junctional/bradycardia and decision was made to pursue PPM.    EP Visit 4/10/20: She presents today for follow up. She reports feeling well. She states she does have intermittent episodes of palpitations, that have not been overly bothersome to her. Device site well healed. She denies any chest " pain/pressures, dizziness, lightheadedness, worsening shortness of breath, leg/ankle swelling, PND, orthopnea, or syncopal symptoms. Device site is reported well healed. Device transmission shows presenting rhythm is AP/VS @ 60 bpm. 1 NSVT, 6 Fast A&V and 272 AF episodes recorded. All EGMs show AF/AT with RVR. AF burden= 75.3%. She is taking eliquis. Normal device function. AP= 26% and = 13%. No short V-V intervals recorded. Lead trends appear stable. Battery estimates 14.2 years to OZZY. Current cardiac medications include: Elqiuis, Atenolol, Digoxin, Lasix, and Spironolactone.     EP Visit 10/9/20: She presents today for follow up. She reports feeling well. She denies any chest pain/pressures, dizziness, lightheadedness, worsening shortness of breath, leg/ankle swelling, PND, orthopnea, palpitations, or syncopal symptoms. Device interrogation shows normal pacemaker function. 2 Fast A&V and 1 NSVT episodes recorded since last remote transmission. Intrinsic rhythm = Atrial Flutter, w/ VS @ 88 bpm. AF burden = 85.9%. Pt is on Eliquis. AP = 16.7%.  = 23.6%.  Estimated battery longevity to OZZY = 13.6 years. No short v-v intervals recorded. Lead trends appear stable. Presenting 12 lead ECG shows AF/AFL and  Vent Rate 69 bpm,  ms, QTc 473 ms. Echo shows LVEF 60-65%, mildly dilated RV with normal function, moderate TR, unchanged from prior echo. Current cardiac medications include: Elqiuis, Atenolol, Digoxin, Lasix, and Spironolactone.     She presents today for follow up. She had some more RVR and her digoxin was resumed. She has been having ongoing fatigue which is now improved with better rate control. She denies any chest pain/pressures, dizziness, lightheadedness, worsening shortness of breath, leg/ankle swelling, PND, orthopnea, palpitations, or syncopal symptoms. Device interrogations shows normal device function, stable lead parameters, intrinsic is AFL/VS/ at 60 bpm, HR histograms adequate, 100%  AF/AFL since 11/2020, AP <0.1%,  49.3%. Echo today shows normal LVEF 60-65%, mildly decreased RV function, moderate TI, and dilated IVC. Current cardiac medications include: Elqiuis, Atenolol, Digoxin, Lasix, and Spironolactone.         PAST MEDICAL HISTORY:  Past Medical History:   Diagnosis Date     Arthritis      Atrial tachycardia (H)      Cardiac arrest (H)      Cardiac arrest (H)      Critical illness myopathy      Diffuse large B-cell lymphoma (H)      Embolism and thrombosis of unspecified artery (H)      History of blood clots 2017    PICC line Right arm      History of chemotherapy      History of transfusion      Hx antineoplastic chemotherapy     lymphoma     Hypertension      Hypertension      Lymphedema of both lower extremities      Neuropathy      Physical deconditioning      Positive PPD, 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     Thrombocytopenia (H)      VSD (ventricular septal defect)        CURRENT MEDICATIONS:  Current Outpatient Medications   Medication Sig Dispense Refill     acetaminophen (TYLENOL) 500 MG tablet Take 500 mg by mouth every 6 hours as needed for mild pain       alendronate (FOSAMAX) 70 MG tablet Take 1 tablet (70 mg) by mouth every 7 days 4 tablet 0     apixaban ANTICOAGULANT (ELIQUIS ANTICOAGULANT) 5 MG tablet Take 1 tablet (5 mg) by mouth 2 times daily 180 tablet 3     atenolol (TENORMIN) 25 MG tablet 50 mg in AM and 25 mg in the evening 90 tablet 3     calcium carbonate 600 mg-vitamin D 400 units (CALTRATE) 600-400 MG-UNIT per tablet Take 2 tablets by mouth daily       digoxin (LANOXIN) 125 MCG tablet Take 1 tablet (125 mcg) by mouth daily 90 tablet 3     furosemide (LASIX) 20 MG tablet Take 1 tablet (20 mg) by mouth daily 90 tablet 3     gabapentin (NEURONTIN) 100 MG capsule TAKE 2 CAPSULES(200 MG) BY MOUTH THREE TIMES DAILY 540 capsule 1     hydroxychloroquine (PLAQUENIL) 200 MG tablet Take 1 tablet (200 mg) by mouth daily Overdue for annual eye exam 90 tablet 2     lisinopril (ZESTRIL) 2.5 MG  tablet Take 1 tablet (2.5 mg) by mouth daily 90 tablet 3     magnesium 250 MG tablet Take 1 tablet by mouth At Bedtime       multivitamin, therapeutic with minerals (THERA-VIT-M) TABS tablet Take 1 tablet by mouth daily 30 each 0     predniSONE (DELTASONE) 5 MG tablet Take 1 tablet (5 mg) by mouth daily 90 tablet 3     spironolactone (ALDACTONE) 25 MG tablet Take 1 tablet (25 mg) by mouth daily 90 tablet 3     STATIN NOT PRESCRIBED (INTENTIONAL) Please choose reason not prescribed, below (Patient not taking: Reported on 10/9/2020)       tolterodine ER (DETROL LA) 4 MG 24 hr capsule Take 1 capsule (4 mg) by mouth daily (Patient not taking: Reported on 5/26/2021) 90 capsule 3       PAST SURGICAL HISTORY:  Past Surgical History:   Procedure Laterality Date     ANESTHESIA CARDIOVERSION N/A 5/17/2017    Procedure: ANESTHESIA CARDIOVERSION;  ANESTHESIA CARDIOVERSION;  Surgeon: GENERIC ANESTHESIA PROVIDER;  Location: [facility]     ANESTHESIA CARDIOVERSION  3/12/2018    Procedure: ANESTHESIA CARDIOVERSION;;  Surgeon: GENERIC ANESTHESIA PROVIDER;  Location: [facility]     ANESTHESIA CARDIOVERSION N/A 3/23/2018    Procedure: ANESTHESIA CARDIOVERSION;  Anesthesia Cardioversion ;  Surgeon: GENERIC ANESTHESIA PROVIDER;  Location: [facility]     ANESTHESIA CARDIOVERSION N/A 4/12/2018    Procedure: ANESTHESIA CARDIOVERSION;  Cardioversion;  Surgeon: GENERIC ANESTHESIA PROVIDER;  Location: [facility] OR     ARTHRODESIS WRIST  2000    Right wrist     BONE MARROW ASPIRATE &BIOPSY  7/12/2017          BONE MARROW BIOPSY W/ ASPIRATION  07/12/2017     C FUSION FOOT BONES,SUBTALAR Right 10/20/2020    Procedure: RIGHT SUBTALAR JOINT FUSION AND CALCANEOCUBOID FUSION;  Surgeon: Nemesio Crow MD;  Location: [facility];  Service: Orthopedics     CARDIOVERSION      5/17/17, 3/12/18, 3/23/18, 4/14/18,      COLONOSCOPY N/A 11/19/2019    Procedure: Colonoscopy, With Polypectomy And Biopsy;  Surgeon: Pj Vasques MD;  Location: [facility]     EP PACEMAKER N/A  12/13/2019    Procedure: EP Pacemaker;  Surgeon: Pj Trent MD;  Location:  [facility] CATH LAB     EXCISE LESION UPPER EXTREMITY Left 5/22/2018    Procedure: EXCISE LESION UPPER EXTREMITY;  Left Arm Wound Excision And Closure, Possible Submuscular Transposition of Ulnar Nerve  (Choice Anesthesia);  Surgeon: Kevin Sheldon MD;  [facility]     FOOT SURGERY      4 left and 2 right     FOOT SURGERY      4 Left and 2 Right      IMPLANT PACEMAKER  12/13/2019    Dr China Trent   [facility] lab      IMPLANT PACEMAKER       RELEASE CARPAL TUNNEL Bilateral      RELEASE CARPAL TUNNEL BILATERAL       REPAIR VENTRICULAR SEPTAL DEFECT  1969     TRANSPOSITION ULNAR NERVE (ELBOW) Left 5/22/2018    Procedure: TRANSPOSITION ULNAR NERVE (ELBOW);;  Surgeon: Kevin Sheldon MD;  Location: [facility]     ULNAR NERVE TRANSPOSITION Left 05/22/2018     VSD REPAIR  1969       ALLERGIES:     Allergies   Allergen Reactions     Amiodarone Other (See Comments) and Difficulty breathing     Lethargic and had trouble breathing- occurred in 2017     Blood Transfusion Related (Informational Only) Hives     Hives with platelets. Give benadryl premedication.     Metoprolol Other (See Comments)     Pt and  report that metoprolol does not work for her and also reports feeling unwell with this medication. She has been able to tolerate atenolol, which as worked in controlling her HR.      Other [No Clinical Screening - See Comments]      Penicillin Allergy Skin Test not performed, see antimicrobial management team progress note 7/5/17.       Penicillins      Tape [Adhesive Tape] Rash       FAMILY HISTORY:  Family History   Problem Relation Age of Onset     Breast Cancer Mother      Hypertension Mother      Alzheimer Disease Mother      Cerebrovascular Disease Mother      Hypertension Father      Cerebrovascular Disease Father      Atrial fibrillation Father      Lymphoma Father      Prostate Cancer Father      Diabetes  Paternal Grandmother      Alzheimer Disease Maternal Grandmother         likely     Arthritis Maternal Grandfather      Pneumonia Maternal Grandfather        SOCIAL HISTORY:  Social History     Tobacco Use     Smoking status: Never Smoker     Smokeless tobacco: Never Used   Substance Use Topics     Alcohol use: Yes     Comment: none     Drug use: No       ROS:   A comprehensive 10 point review of systems negative other than as mentioned in HPI.  Exam:  /78   Pulse 81   Wt 57.5 kg (126 lb 11.2 oz)   SpO2 97%   BMI 27.32 kg/m    GENERAL APPEARANCE: alert and no distress  HEENT: no icterus, no xanthelasmas, normal pupil size and reaction, normal palate, mucosa moist, no central cyanosis  NECK: Supple.  RESPIRATORY: lungs clear to auscultation - no rales, rhonchi or wheezes, no use of accessory muscles, no retractions, respirations are unlabored, normal respiratory rate  CARDIOVASCULAR:Irregular, soft murmur.  ABDOMEN: soft, non tender, bowel sounds normal, non-distended  EXTREMITIES: peripheral pulses normal, no edema  NEURO: alert and oriented to person/place/time, normal speech, gait and affect  SKIN: no ecchymoses, no rashes  PSYCH: normal affect, cooperative    Labs:  Reviewed.     Testing/Procedures:  9/11/17 ECHOCARDIOGRAM:   Interpretation Summary  Left ventricular function, chamber size, wall motion, and wall thickness are  normal.The EF is 60-65% (traced 60%.) GLS -18%.  The right ventricle is normal size and normal in function. The RV is mildly  dilated.  Moderate tricuspid insufficiency is present.  Mild pulmonary hypertension is present (esimated PASP 55 mmHg including RA  pressure.)  Dilation of the inferior vena cava is present with abnormal respiratory  variation in diameter (esitimated RAP 15 mmHg.)  This study was compared with the study from 8/31/17: TR appears slightly  decreased from the earlier study.     7/24/17 CMRI:     1. The LV is normal in cavity size and wall thickness. The global  systolic function is normal. The LVEF is  64%. There are no regional wall motion abnormalities. No evidence of myocardial iron overload.      2. The RV is normal in cavity size. The global systolic function is normal. The RVEF is 59%.      3. There is moderate dilation of bilateral atria.      4. Tricuspid regurgitation is present, difficult to quantify due to image quality.      5. Late gadolinium enhancement imaging shows RV insertion site hyperenhancement, a non-specific finding  seen in patients with RV volume/pressure overload.      6. There is a moderate right pleural effusion and small left pleural effusion.      CONCLUSIONS: Normal Bi-Ventricular systolic function, LVEF 64% and RVE 59%. There is no evidence of  infiltrative disease. Compared to the MRI from 5/15/2017, there is no significant change.      7/2017 BRE:  Interpretation Summary  There is a small mass (0.7 cm by 0.2 cm) on the ventricular side of the right  coronary cusp. There is a second mass (0.4 cm by 0.2 cm) observed in the LVOT,  attached to the mitro-aortic curtain. There is a third mass on the noncoronary  cusp that measures 0.4 cm by 0.2 cm that could be a healing vegetation. These  findings are compatible with endocarditis if clinical picture supports.  Right ventricular function, chamber size, wall motion, and thickness are  normal.  This study was compared with the study from 7/10/2017.  There is detection of masses on the aortic valve and LVOT.     4/12/18 CMRI:  1. The LV is normal in cavity size and wall thickness. The global systolic function is normal. The LVEF is  54%. There are no regional wall motion abnormalities.     2. The RV is mildly dilated in cavity size. The global systolic function is normal. The RVEF is 49%.      3. Both atria are severely enlarged.     4. There is at least moderate, possibly severe tricuspid regurgitation.      5. Late gadolinium enhancement imaging shows hyperenhancement in the RV insertion site  consistent with RV  pressure/volume overload.      6. There is a loculated pericardial effusion along the basal lateral and basal to mid inferior LV walls,  and along the inferior RV wall. it appears exudative in nature, and is beginning to organize. There is  diffuse pericardial enhancement.     7. There is no intracardiac thrombus or mass.     8. There is a small right pleural effusion.     9/26/18 CHEST CT:                                                                   IMPRESSION: In this patient with a history of lymphoma:  1. No evidence of disease recurrence, consistent with complete  response per Lugano criteria.  2. Stable cardiomegaly. Improved pericardial effusion.  3. Early contrast phase with heterogeneous visceral enhancement in the  abdomen, likely secondary to cardiac dysfunction.    10/9/20 ECHOCARDIOGRAM:  Interpretation Summary  VSD s/p repair in 1969  Global and regional left ventricular function is normal with an EF of 60-65%.  No flow acceleration is seen across the septum.  Global right ventricular function is normal. Mild right ventricular dilation  is present.  There is moderate tricuspid regurgitation.  The estimated PA systolic pressure is 32 mmHg but this is likely to be  underestimated due to the presence of multiple jets.  This study was compared with the study from 09/11/2017. There has been no  change in the severity of the tricuspid regurgitation or the RV size/function.  8/2021 ECHOCARDIOGRAM:  terpretation Summary  H/O of VSD repair in 1969  The visual ejection fraction is 60-65%. No wall motion abnormalities. Global  right ventricular function is mildly reduced.  Moderate tricuspid insufficiency is present.  There is a right ventricular right atrial pressure difference of 24mmHg.  IVC diameter >2.1 cm collapsing <50% with sniff suggests a high RA pressure  estimated at 15 mmHg or greater.  No pericardial effusion is present.  Assessment and Plan:   Ms. Michel is a 60 year old  female who has a past medical history significant for VSD s/p repair 1969, RA, HTN, insomnia, atrial tachyarrhythmias, cardiac arrest, SSS s/p PPM 12/2019,  DLBCL, and exudative pericardial effusion. She presents today for follow up. She had some more RVR and her digoxin was resumed. She has been having ongoing fatigue which is now improved with better rate control. She denies any chest pain/pressures, dizziness, lightheadedness, worsening shortness of breath, leg/ankle swelling, PND, orthopnea, palpitations, or syncopal symptoms. Device interrogations shows normal device function, stable lead parameters, intrinsic is AFL/VS/ at 60 bpm, HR histograms adequate, 100% AF/AFL since 11/2020, AP <0.1%,  49.3%. Echo today shows normal LVEF 60-65%, mildly decreased RV function, moderate TI, and dilated IVC. Current cardiac medications include: Elqiuis, Atenolol, Digoxin, Lasix, and Spironolactone.     Sick Sinus Syndrome:   Atrial Fibrillation:  1. Stroke Prophylaxis:  CHADSVASC=+HTN, +gender  2, corresponding to a 2.2% annual stroke / [facility] event rate. indicating need for long term oral anticoagulation.  She is on Eliquis. No bleeding issues.   2. Rate Control: Continue Atenolol. Had RVR off digoxin and is much better on digoxin, continue. Will get updated digoxin level.   3. Rhythm Control: Cardioversion, Antiarrhythmics and/or ablation are options for rhythm control. She has had several DCCV last in 4/12/19. Notably, she had possible side effects from Sotalol (however, likely was due to underlying illness at the time and not from medication since she was found to have DBCL).  She is currently in an AFL but is asymptomatic and LVEF preserved.   4. Had tachy-shelton syndrome and underwent PPM implant in 12/2019. Normal device function with stable lead parameters. She will have continued follow up with Device clinic per routine.     Follow up in 1 year with an echo.    The patient states understanding and is  agreeable with plan.   Juan Eaton MD [facility]  Cardiology - Electrophysiology

## 2021-08-26 ENCOUNTER — PATIENT OUTREACH (OUTPATIENT)
Dept: CARDIOLOGY | Facility: CLINIC | Age: 61
End: 2021-08-26

## 2021-08-26 PROBLEM — I50.9 CHF (CONGESTIVE HEART FAILURE) (H): Status: ACTIVE | Noted: 2018-03-15

## 2021-08-26 NOTE — TELEPHONE ENCOUNTER
Called Amelia to review echo results and assess fluid status. Echo showed dilated IVC. Amelia reports feeling well and her weight has drifted down a bit. Most recently at 125-126.     Per CORE note on 7/21: She notes weight loss and is actively trying at 128 lbs today    She denies any SOB, MONTALVO. She denies any swelling in legs or bloating in belly.   Has CORE follow up in October.   Will update provider. Pao Alston RN

## 2021-08-31 NOTE — PLAN OF CARE
Chief Complaint   Patient presents with    Physical     would like to talk about labs       Visit Vitals  /76 (BP 1 Location: Left arm)   Pulse 67   Temp 97.3 °F (36.3 °C)   Resp 15   Ht 6' (1.829 m)   Wt 185 lb 12.8 oz (84.3 kg)   SpO2 98%   BMI 25.20 kg/m²       1. Have you been to the ER, urgent care clinic since your last visit? Hospitalized since your last visit? No    2. Have you seen or consulted any other health care providers outside of the [address] [place] [address] since your last visit? Include any pap smears or colon screening.  No Problem: Goal Outcome Summary  Goal: Goal Outcome Summary  Outcome: No Change    Pt continues in A fib. HR in 130- 160's at start of shift. Metoprolol IV 5 mg given x 2 overnight to keep HR under 130. Team aware.  HR now 100 to 120's. Pt denies any chest pain/pressure overnight. Pt did report some chronic back pain and received Tylenol PO PRN x 1 overnight.  Pt up to bathroom with standby assist. RN will continue to monitor and update team with changes. Bea Jimenez

## 2021-09-09 ENCOUNTER — VIRTUAL VISIT (OUTPATIENT)
Dept: RHEUMATOLOGY | Facility: CLINIC | Age: 61
End: 2021-09-09
Attending: INTERNAL MEDICINE
Payer: COMMERCIAL

## 2021-09-09 DIAGNOSIS — T38.0X5A STEROID-INDUCED OSTEOPOROSIS: ICD-10-CM

## 2021-09-09 DIAGNOSIS — M81.8 STEROID-INDUCED OSTEOPOROSIS: ICD-10-CM

## 2021-09-09 DIAGNOSIS — Z79.899 LONG-TERM USE OF PLAQUENIL: ICD-10-CM

## 2021-09-09 DIAGNOSIS — M05.79 RHEUMATOID ARTHRITIS INVOLVING MULTIPLE SITES WITH POSITIVE RHEUMATOID FACTOR (H): Primary | ICD-10-CM

## 2021-09-09 PROCEDURE — 99213 OFFICE O/P EST LOW 20 MIN: CPT | Mod: GT | Performed by: INTERNAL MEDICINE

## 2021-09-09 RX ORDER — MULTIVIT-MIN/IRON/FOLIC ACID/K 18-600-40
CAPSULE ORAL
COMMUNITY
End: 2021-11-11

## 2021-09-09 ASSESSMENT — PAIN SCALES - GENERAL: PAINLEVEL: MODERATE PAIN (5)

## 2021-09-09 NOTE — LETTER
9/9/2021       RE: Amelia Michel  [address]  [facility] [id number]     Dear Colleague,    Thank you for referring your patient, Amelia Michel, to the [facility] RHE[facility]OLOG[facility] [place] [facility]. Please see a copy of my visit note below.    Last seen by Frederic Gongora NP: 3/10/2021    DOS: 9/9/2021    [facility] - Rheumatology Clinic Visit    Amelia Michel  is a 60 year old here for rheumatoid arthritis (RA) dx 33 y/o. +CCP >331 -RF -KALYAN panel. 5-2108 XR 5-2018 DDD and facet osteoarthritis, congential fusion C2-3) prednisone since dx at 33 y/o, hydroxychloroquine (plaquenil) long-time since dx tapered by 200 mg 9-2019. Lymphoma in liver, heart, bone and mass between esophagus, left pelvic, right ankle.     Review-Dr. Dumas in [facility] for many years. She had relatively inactive disease prior to her arrest but was on methotrexate 10 mg PO per week, Arava 10 mg daily, Plaquenil 200 mg twice per day, and prednisone 3 mg daily. She has had partial fusion of her right wrist. After discharge from the hospital she was put on prednisone 5 mg daily as monotherapy. Sulfasalazine -not effective long-time, initial alan but resolved after retried. Past Dr. Cunha and Dr. 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 Treated concervatively with plaquenil and 5mg prednisone, and not interested in rituximab or other biologics. She has previously been treated with sulfasalazine and initially developed a rash which was initially thought to be a sulfa rash, but her rash resolved and did not reoccur after being placed back on sulfasalazine. She was also treated with Arava, but this discontinued during her cancer treatment. She feels that these drugs have had no effect on her symptoms. Etanercept (enbrel) in past no  "benefits.     March 10, 2021  Denies any fever, chills, SOB, MONTALVO, night sweats, or chest pain, high fever, cough, travel in last 14 days or exposure to covid-19 (coronavirus). Reports healthy. Follows CDC guidelines. Sinus issues for a week other then that fine. Not out tho either. Will be getting vaccine signing in now.     Arthritis is hand bothering her. Left worse, left hand neuropathy from antecubital IV reaction \"take out muscle and fat\". Its her thumb gets her issues. Hurts all the time.  Not swelling. Base of thumb some tingling. The same. With her history cardiac arrest and chemo does have splint, does not need OT. Walking now and mentally doing good.      Right ankle triple arthrodesis Oct 2020, learning how to walk properly. \"much better\".  Doing more more active.     Fosamax 70 mg every 7 days restarted 9-2020 per Dr. Maribell Domínguez, was on with PCP  In past \"not concerned on for too long\" will need future time off of this was off for 18 month. Prednisone 5 mg once day chronically, hydroxychloroquine (plaquenil) 200 mg once day -eye exam in Dec 2020 early catarct          Today 9/9/2021: Ms. Michel is doing fairly well with no major complaints or RA flare ups on  mg qd+predniosne 5 mg every day. Eye exam is updated. Fully vaccinated. PCP told her to hold fosamax till further discussion in Oct.      Has pain over palms. This gets worse throughout the day. No AM stiffness. Does more chores around the house which is a trigger.     1st covid vaccine caused body ache, second dose caused fatigue.        PMH:  Injury-left thumb amputation, left finger broke  Medical-  Antiplatelet or antithrombotic long-term use  Arrhythmia  Atrial tachycardia, flutter  Paroxysmal atrial fibrillation  Arthritis  Lymphoma  Cardiac arrest  history of chemotherapy  Primary pulmonary hypertension  Neuropathy  Postoperative nausea and vomiting  Rheumatoid arthritis  Hyperlipidemia LDL goal <130  Lymphedema of both lower " "extremities  Cardiogenic shock and cardiac arrest 5/2017  Acute respiratory failure with hypoxia  Critical illness myopathy  Sepsis  Wound of left upper extremity  Diffuse large B-cell lymphoma of extranodal site  Febrile neutropenia  Physical deconditioning  Palpitations  Osteoporosis  -DEXA 2018   Slowly healing wound in medial left antecubital fossa after traumatic IV  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, latter felt from lymphoma   Neuropathy of right foot with weakness after intrathecal cytarabine  perioditis   Right arm PICC blood clots during cancer treatments  Right shoulder effusion when had pericarditis       Past Surgical History:  Anesthesia cardioversion  Right wrist arthrodesis, partial fusion \"history migrated out\"  Bone marrow aspirate & biopsy  Excise lesion upper extremity - left wound excision and closure, possible submuscular transposition of ulnar nerve  Foot surgery - 4 left, 2 right  Carpal tunnel bilateral release  Repair ventricular septal defect  Transpositional ulnar nerve (elbow) left   Right shoulder effusion history     Family History:   No autoimmune disorders, psoriasis, UC, crohn's, SLE, RA, PsA, gout, autoimmune thyroid.  No MS, heart disease in family  Mother - breast cancer, hypertension, alzheimer disease-passed   Father - hypertension, lymphoma, circulatory A fib-passed  Paternal grandmother - diabetes  Siblings-younger sister heatlhy PB, younger brother think arthritis not dx PB     Social History:   No smoking or tobacco use. No alcohol use. No IVDU. None Children. Right handed. . Disability          ROS:    A comprehensive ROS was done. Positives are per HPI.      Ph. E:    No exam    Labs/Imaging:  Eye Exam (10/23/18)  Significant for mild H-lated nuclear cataracts.   Ocular CT was recommended for right eye.     CT Chest/Abdomen/Pelvis (9/26/18)  In this patient with a history of lymphoma:  1. No evidence of disease recurrence, " consistent with complete  response per Lugano criteria.  2. Stable cardiomegaly. Improved pericardial effusion.  3. Early contrast phase with heterogeneous visceral enhancement in the  abdomen, likely secondary to cardiac dysfunction.    MRI Cardiac w/contrast (4/12/18)  Loculated exudative pericardial effusion that is beginning to organize, with diffuse  pericardial enhancement consistent with ongoing inflammation. Normal LV fxn, LVEF 54% and RVEF 49%. At  least moderate, possibly severe tricuspid regurgitation. Compared to MRI from 03/2018, effusion is largely  unchanged in size (maybe a bit smaller) but is starting to organize. No change in pericardial enhancement.    Laboratory:   Component      Latest Ref Rng & Units 3/25/2021 5/26/2021 6/23/2021 7/21/2021   WBC      4.0 - 11.0 thou/uL 5.6      RBC Count      3.80 - 5.40 mill/uL 4.31      Hemoglobin      12.0 - 16.0 g/dL 15.4      Hematocrit      35.0 - 47.0 % 44.6      MCV      80 - 100 fL 104 (H)      MCH      27.0 - 34.0 pg 35.7 (H)      MCHC      32.0 - 36.0 g/dL 34.5      RDW      11.0 - 14.5 % 14.4      Platelet Count      140 - 440 thou/uL 126 (L)      Mean Platelet Volume      7.0 - 10.0 fL 9.5      % Neutrophils      50 - 70 % 80 (H)      % Lymphocytes      20 - 40 % 10 (L)      % Monocytes      2 - 10 % 9      % Eosinophils      0 - 6 % 1      % Basophils      0 - 2 % 0      % Immature Granulocytes      <=0 % 0      Absolute Neutrophils      2.0 - 7.7 thou/uL 4.5      Absolute Lymphocytes      0.8 - 4.4 thou/uL 0.6 (L)      Absolute Monocytes      0.0 - 0.9 thou/uL 0.5      Eosinophils Absolute      0.0 - 0.4 thou/uL 0.0      Absolute Basophils      0.0 - 0.2 thou/uL 0.0      Absolute Immature Granulocytes      <=0.0 thou/uL 0.0      Sodium      133 - 144 mmol/L  136 136 137   Potassium      3.4 - 5.3 mmol/L  3.6 4.2 3.9   Chloride      94 - 109 mmol/L  101 102 103   Carbon Dioxide      20 - 32 mmol/L  24 28 28   Anion Gap      3 - 14 mmol/L  11 6 6    Glucose      70 - 99 mg/dL  100 (H) 98 110 (H)   Urea Nitrogen      7 - 30 mg/dL  21 25 29   Creatinine      0.52 - 1.25 mg/dL  1.00 1.05 (H) 1.02   GFR Estimate      >60 mL/min/1.73m2  61 57 (L) 60 (L)   GFR Estimate If Black      >60 mL/min/1.73:m2  71 67    Calcium      8.5 - 10.1 mg/dL  9.3 10.0 9.6   Bilirubin Direct      0.0 - 0.2 mg/dL   0.5 (H)    Bilirubin Total      0.2 - 1.3 mg/dL   1.2    Albumin      3.4 - 5.0 g/dL   4.2    Protein Total      6.8 - 8.8 g/dL   7.5    Alkaline Phosphatase      40 - 150 U/L   136    ALT      0 - 50 U/L   41    AST      0 - 45 U/L   36    N-Terminal Pro Bnp      0 - 125 pg/mL  1,274 (H)     Hemoglobin A1C      0 - 5.6 %  6.2 (H)     Immunoglobulin G      700-1,700 mg/dL 1,179          Impression/Plan:  1. Seropositive rheumatoid arthritis (, RF 31) with multiple joint disability including MTP fusion 1-5 bilaterally, partial right wrist fusion, subluxation and ulnar deformity of MCP's. Rheumatoid arthritis (RA) controlled.  Will continue prednisone 5 mg qd, and hydroxychloroquine (plaquenil) 200 mg once a day.     2. Long-term hydroxychloroquine (plaquenil) use since 6-2017,  no toxicity, reviewed AAO guidelines, repeat every year.    3. Diffuse large B-cell lymphoma status-post 1 cycle R-EPOCH, 6 cycles R-CHOP; Neuropathy of lower extremities attributed to lymphoma improved on gabapentin 200 mg TID-tolerating. Loculated pericardial effusion, etiology unclear (lymphoma). No symptoms now.     per oncology for surveillance Seeing next week    4. Osteoporosis, chronic long-term corticosteroid use with cushingoid appearance. Received 6-7 years of alendronate in early 2000s. Restarted alendronate in 1/2019, off 3-2020 to 9-2020 then restarted 9-2020. DEXA  T-2.6 thoracic. If > 5 yr higher risk atypical Fx. Off fosamax now given improvement of bone density in 3/2021 per PCP but on chronic steroid tx, will refer to endocrinology to manage steroid induced  osteoporosis. Calcium and GFR normal  .    5. Discussed covid booster vaccine, not on immunosuppressant, advised her to wait for official recommendation within next few weeks.    Recent CBC diff, CMP were reviewed, stable. Will check ESR/CRP with next blood work next week.      Plan:    Refer to endocrinology     Labs on 9/16    Keep follow up in March 2022, in person      Maribell Domínguez MD    11 min talking over the phone, 28 min total reviewing chart, coordination of care.    Orders Placed This Encounter   Procedures     CRP inflammation     Erythrocyte sedimentation rate auto     Adult Endocrinology Referral       Amelia is a 60 year old who is being evaluated via a billable telephone visit.      What phone number would you like to be contacted at? [phone]  How would you like to obtain your AVS? [employer]  Phone call duration: 11 minutes

## 2021-09-09 NOTE — PROGRESS NOTES
Amelia is a 60 year old who is being evaluated via a billable telephone visit.      What phone number would you like to be contacted at? [phone]  How would you like to obtain your AVS? Queenie  Phone call duration: 11 minutes

## 2021-09-09 NOTE — PROGRESS NOTES
Last seen by Frederic Gongora NP: 3/10/2021    DOS: 9/9/2021    [facility] - Rheumatology Clinic Visit    Amelia Michel  is a 60 year old here for rheumatoid arthritis (RA) dx 33 y/o. +CCP >331 -RF -KALYAN panel. 5-2108 XR 5-2018 DDD and facet osteoarthritis, congential fusion C2-3) prednisone since dx at 33 y/o, hydroxychloroquine (plaquenil) long-time since dx tapered by 200 mg 9-2019. Lymphoma in liver, heart, bone and mass between esophagus, left pelvic, right ankle.     Review-Dr. Dumas in [facility] for many years. She had relatively inactive disease prior to her arrest but was on methotrexate 10 mg PO per week, Arava 10 mg daily, Plaquenil 200 mg twice per day, and prednisone 3 mg daily. She has had partial fusion of her right wrist. After discharge from the hospital she was put on prednisone 5 mg daily as monotherapy. Sulfasalazine -not effective long-time, initial alan but resolved after retried. Past Dr. Cunha and Dr. 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 Treated concervatively with plaquenil and 5mg prednisone, and not interested in rituximab or other biologics. She has previously been treated with sulfasalazine and initially developed a rash which was initially thought to be a sulfa rash, but her rash resolved and did not reoccur after being placed back on sulfasalazine. She was also treated with Arava, but this discontinued during her cancer treatment. She feels that these drugs have had no effect on her symptoms. Etanercept (enbrel) in past no benefits.     March 10, 2021  Denies any fever, chills, SOB, MONTALVO, night sweats, or chest pain, high fever, cough, travel in last 14 days or exposure to covid-19 (coronavirus). Reports healthy. Follows CDC guidelines. Sinus issues for a week other then that fine. Not out tho either. Will be getting vaccine signing in now.  "    Arthritis is hand bothering her. Left worse, left hand neuropathy from antecubital IV reaction \"take out muscle and fat\". Its her thumb gets her issues. Hurts all the time.  Not swelling. Base of thumb some tingling. The same. With her history cardiac arrest and chemo does have splint, does not need OT. Walking now and mentally doing good.      Right ankle triple arthrodesis Oct 2020, learning how to walk properly. \"much better\".  Doing more more active.     Fosamax 70 mg every 7 days restarted 9-2020 per Dr. Maribell Domínguez, was on with PCP  In past \"not concerned on for too long\" will need future time off of this was off for 18 month. Prednisone 5 mg once day chronically, hydroxychloroquine (plaquenil) 200 mg once day -eye exam in Dec 2020 early catarct          Today 9/9/2021: Ms. Michel is doing fairly well with no major complaints or RA flare ups on  mg qd+predniosne 5 mg every day. Eye exam is updated. Fully vaccinated. PCP told her to hold fosamax till further discussion in Oct.      Has pain over palms. This gets worse throughout the day. No AM stiffness. Does more chores around the house which is a trigger.     1st covid vaccine caused body ache, second dose caused fatigue.        PMH:  Injury-left thumb amputation, left finger broke  Medical-  Antiplatelet or antithrombotic long-term use  Arrhythmia  Atrial tachycardia, flutter  Paroxysmal atrial fibrillation  Arthritis  Lymphoma  Cardiac arrest  history of chemotherapy  Primary pulmonary hypertension  Neuropathy  Postoperative nausea and vomiting  Rheumatoid arthritis  Hyperlipidemia LDL goal <130  Lymphedema of both lower extremities  Cardiogenic shock and cardiac arrest 5/2017  Acute respiratory failure with hypoxia  Critical illness myopathy  Sepsis  Wound of left upper extremity  Diffuse large B-cell lymphoma of extranodal site  Febrile neutropenia  Physical deconditioning  Palpitations  Osteoporosis  -DEXA 2018   Slowly healing wound in " "medial left antecubital fossa after traumatic IV  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, latter felt from lymphoma   Neuropathy of right foot with weakness after intrathecal cytarabine  perioditis   Right arm PICC blood clots during cancer treatments  Right shoulder effusion when had pericarditis       Past Surgical History:  Anesthesia cardioversion  Right wrist arthrodesis, partial fusion \"history migrated out\"  Bone marrow aspirate & biopsy  Excise lesion upper extremity - left wound excision and closure, possible submuscular transposition of ulnar nerve  Foot surgery - 4 left, 2 right  Carpal tunnel bilateral release  Repair ventricular septal defect  Transpositional ulnar nerve (elbow) left   Right shoulder effusion history     Family History:   No autoimmune disorders, psoriasis, UC, crohn's, SLE, RA, PsA, gout, autoimmune thyroid.  No MS, heart disease in family  Mother - breast cancer, hypertension, alzheimer disease-passed   Father - hypertension, lymphoma, circulatory A fib-passed  Paternal grandmother - diabetes  Siblings-younger sister heatlhy PB, younger brother think arthritis not dx PB     Social History:   No smoking or tobacco use. No alcohol use. No IVDU. None Children. Right handed. . Disability          ROS:    A comprehensive ROS was done. Positives are per HPI.      Ph. E:    No exam    Labs/Imaging:  Eye Exam (10/23/18)  Significant for mild H-lated nuclear cataracts.   Ocular CT was recommended for right eye.     CT Chest/Abdomen/Pelvis (9/26/18)  In this patient with a history of lymphoma:  1. No evidence of disease recurrence, consistent with complete  response per Lug[place] criteria.  2. Stable cardiomegaly. Improved pericardial effusion.  3. Early contrast phase with heterogeneous visceral enhancement in the  abdomen, likely secondary to cardiac dysfunction.    MRI Cardiac w/contrast (4/12/18)  Loculated exudative pericardial effusion that is beginning " to organize, with diffuse  pericardial enhancement consistent with ongoing inflammation. Normal LV fxn, LVEF 54% and RVEF 49%. At  least moderate, possibly severe tricuspid regurgitation. Compared to MRI from 03/2018, effusion is largely  unchanged in size (maybe a bit smaller) but is starting to organize. No change in pericardial enhancement.    Laboratory:   Component      Latest Ref Rng & Units 3/25/2021 5/26/2021 6/23/2021 7/21/2021   WBC      4.0 - 11.0 thou/uL 5.6      RBC Count      3.80 - 5.40 mill/uL 4.31      Hemoglobin      12.0 - 16.0 g/dL 15.4      Hematocrit      35.0 - 47.0 % 44.6      MCV      80 - 100 fL 104 (H)      MCH      27.0 - 34.0 pg 35.7 (H)      MCHC      32.0 - 36.0 g/dL 34.5      RDW      11.0 - 14.5 % 14.4      Platelet Count      140 - 440 thou/uL 126 (L)      Mean Platelet Volume      7.0 - 10.0 fL 9.5      % Neutrophils      50 - 70 % 80 (H)      % Lymphocytes      20 - 40 % 10 (L)      % Monocytes      2 - 10 % 9      % Eosinophils      0 - 6 % 1      % Basophils      0 - 2 % 0      % Immature Granulocytes      <=0 % 0      Absolute Neutrophils      2.0 - 7.7 thou/uL 4.5      Absolute Lymphocytes      0.8 - 4.4 thou/uL 0.6 (L)      Absolute Monocytes      0.0 - 0.9 thou/uL 0.5      Eosinophils Absolute      0.0 - 0.4 thou/uL 0.0      Absolute Basophils      0.0 - 0.2 thou/uL 0.0      Absolute Immature Granulocytes      <=0.0 thou/uL 0.0      Sodium      133 - 144 mmol/L  136 136 137   Potassium      3.4 - 5.3 mmol/L  3.6 4.2 3.9   Chloride      94 - 109 mmol/L  101 102 103   Carbon Dioxide      20 - 32 mmol/L  24 28 28   Anion Gap      3 - 14 mmol/L  11 6 6   Glucose      70 - 99 mg/dL  100 (H) 98 110 (H)   Urea Nitrogen      7 - 30 mg/dL  21 25 29   Creatinine      0.52 - 1.25 mg/dL  1.00 1.05 (H) 1.02   GFR Estimate      >60 mL/min/1.73m2  61 57 (L) 60 (L)   GFR Estimate If Black      >60 mL/min/1.73:m2  71 67    Calcium      8.5 - 10.1 mg/dL  9.3 10.0 9.6   Bilirubin Direct       0.0 - 0.2 mg/dL   0.5 (H)    Bilirubin Total      0.2 - 1.3 mg/dL   1.2    Albumin      3.4 - 5.0 g/dL   4.2    Protein Total      6.8 - 8.8 g/dL   7.5    Alkaline Phosphatase      40 - 150 U/L   136    ALT      0 - 50 U/L   41    AST      0 - 45 U/L   36    N-Terminal Pro Bnp      0 - 125 pg/mL  1,274 (H)     Hemoglobin A1C      0 - 5.6 %  6.2 (H)     Immunoglobulin G      700-1,700 mg/dL 1,179          Impression/Plan:  1. Seropositive rheumatoid arthritis (, RF 31) with multiple joint disability including MTP fusion 1-5 bilaterally, partial right wrist fusion, subluxation and ulnar deformity of MCP's. Rheumatoid arthritis (RA) controlled.  Will continue prednisone 5 mg qd, and hydroxychloroquine (plaquenil) 200 mg once a day.     2. Long-term hydroxychloroquine (plaquenil) use since 6-2017,  no toxicity, reviewed AAO guidelines, repeat every year.    3. Diffuse large B-cell lymphoma status-post 1 cycle R-EPOCH, 6 cycles R-CHOP; Neuropathy of lower extremities attributed to lymphoma improved on gabapentin 200 mg TID-tolerating. Loculated pericardial effusion, etiology unclear (lymphoma). No symptoms now.     per oncology for surveillance Seeing next week    4. Osteoporosis, chronic long-term corticosteroid use with cushingoid appearance. Received 6-7 years of alendronate in early 2000s. Restarted alendronate in 1/2019, off 3-2020 to 9-2020 then restarted 9-2020. DEXA  T-2.6 thoracic. If > 5 yr higher risk atypical Fx. Off fosamax now given improvement of bone density in 3/2021 per PCP but on chronic steroid tx, will refer to endocrinology to manage steroid induced osteoporosis. Calcium and GFR normal  .    5. Discussed covid booster vaccine, not on immunosuppressant, advised her to wait for official recommendation within next few weeks.    Recent CBC diff, CMP were reviewed, stable. Will check ESR/CRP with next blood work next week.      Plan:    Refer to endocrinology     Labs on  9/16    Keep follow up in March 2022, in person      Maribell Domínguez MD    11 min talking over the phone, 28 min total reviewing chart, coordination of care.    Orders Placed This Encounter   Procedures     CRP inflammation     Erythrocyte sedimentation rate auto     Adult Endocrinology Referral

## 2021-09-16 ENCOUNTER — OFFICE VISIT (OUTPATIENT)
Dept: TRANSPLANT | Facility: CLINIC | Age: 61
End: 2021-09-16
Attending: INTERNAL MEDICINE
Payer: COMMERCIAL

## 2021-09-16 ENCOUNTER — APPOINTMENT (OUTPATIENT)
Dept: LAB | Facility: CLINIC | Age: 61
End: 2021-09-16
Attending: INTERNAL MEDICINE
Payer: COMMERCIAL

## 2021-09-16 VITALS
DIASTOLIC BLOOD PRESSURE: 74 MMHG | OXYGEN SATURATION: 94 % | WEIGHT: 127.3 LBS | TEMPERATURE: 97.2 F | BODY MASS INDEX: 26.72 KG/M2 | HEIGHT: 58 IN | HEART RATE: 94 BPM | RESPIRATION RATE: 16 BRPM | SYSTOLIC BLOOD PRESSURE: 107 MMHG

## 2021-09-16 DIAGNOSIS — C83.3A DIFFUSE LARGE CELL LYMPHOMA IN REMISSION: ICD-10-CM

## 2021-09-16 DIAGNOSIS — M05.79 RHEUMATOID ARTHRITIS INVOLVING MULTIPLE SITES WITH POSITIVE RHEUMATOID FACTOR (H): ICD-10-CM

## 2021-09-16 DIAGNOSIS — Z79.899 LONG-TERM USE OF PLAQUENIL: ICD-10-CM

## 2021-09-16 DIAGNOSIS — T38.0X5A STEROID-INDUCED OSTEOPOROSIS: ICD-10-CM

## 2021-09-16 DIAGNOSIS — Z86.74 H/O CARDIAC ARREST: Primary | ICD-10-CM

## 2021-09-16 DIAGNOSIS — M81.8 STEROID-INDUCED OSTEOPOROSIS: ICD-10-CM

## 2021-09-16 DIAGNOSIS — I47.19 ATRIAL TACHYCARDIA (H): ICD-10-CM

## 2021-09-16 LAB
ALBUMIN SERPL-MCNC: 4.1 G/DL (ref 3.4–5)
ALP SERPL-CCNC: 133 U/L (ref 40–150)
ALT SERPL W P-5'-P-CCNC: 37 U/L (ref 0–70)
ANION GAP SERPL CALCULATED.3IONS-SCNC: 9 MMOL/L (ref 3–14)
AST SERPL W P-5'-P-CCNC: 28 U/L (ref 0–45)
BASOPHILS # BLD AUTO: 0 10E3/UL (ref 0–0.2)
BASOPHILS NFR BLD AUTO: 0 %
BILIRUB SERPL-MCNC: 1.3 MG/DL (ref 0.2–1.3)
BUN SERPL-MCNC: 32 MG/DL (ref 7–30)
CALCIUM SERPL-MCNC: 10 MG/DL (ref 8.5–10.1)
CHLORIDE BLD-SCNC: 100 MMOL/L (ref 94–109)
CO2 SERPL-SCNC: 27 MMOL/L (ref 20–32)
CREAT SERPL-MCNC: 1.32 MG/DL (ref 0.52–1.25)
CRP SERPL-MCNC: 31.9 MG/L (ref 0–8)
EOSINOPHIL # BLD AUTO: 0 10E3/UL (ref 0–0.7)
EOSINOPHIL NFR BLD AUTO: 0 %
ERYTHROCYTE [DISTWIDTH] IN BLOOD BY AUTOMATED COUNT: 13.4 % (ref 10–15)
ERYTHROCYTE [SEDIMENTATION RATE] IN BLOOD BY WESTERGREN METHOD: 13 MM/HR (ref 0–30)
GFR SERPL CREATININE-BSD FRML MDRD: 44 ML/MIN/1.73M2
GLUCOSE BLD-MCNC: 135 MG/DL (ref 70–99)
HCT VFR BLD AUTO: 42 % (ref 35–53)
HGB BLD-MCNC: 14.4 G/DL (ref 11.7–17.7)
HOLD SPECIMEN: NORMAL
IMM GRANULOCYTES # BLD: 0 10E3/UL
IMM GRANULOCYTES NFR BLD: 1 %
LYMPHOCYTES # BLD AUTO: 0.7 10E3/UL (ref 0.8–5.3)
LYMPHOCYTES NFR BLD AUTO: 9 %
MCH RBC QN AUTO: 35.5 PG (ref 26.5–33)
MCHC RBC AUTO-ENTMCNC: 34.3 G/DL (ref 31.5–36.5)
MCV RBC AUTO: 103 FL (ref 78–100)
MONOCYTES # BLD AUTO: 0.6 10E3/UL (ref 0–1.3)
MONOCYTES NFR BLD AUTO: 8 %
NEUTROPHILS # BLD AUTO: 6.2 10E3/UL (ref 1.6–8.3)
NEUTROPHILS NFR BLD AUTO: 82 %
NRBC # BLD AUTO: 0 10E3/UL
NRBC BLD AUTO-RTO: 0 /100
PLATELET # BLD AUTO: 161 10E3/UL (ref 150–450)
POTASSIUM BLD-SCNC: 4.4 MMOL/L (ref 3.4–5.3)
PROT SERPL-MCNC: 8.3 G/DL (ref 6.8–8.8)
RBC # BLD AUTO: 4.06 10E6/UL (ref 3.8–5.9)
SODIUM SERPL-SCNC: 136 MMOL/L (ref 133–144)
WBC # BLD AUTO: 7.6 10E3/UL (ref 4–11)

## 2021-09-16 PROCEDURE — 86140 C-REACTIVE PROTEIN: CPT | Performed by: INTERNAL MEDICINE

## 2021-09-16 PROCEDURE — 82784 ASSAY IGA/IGD/IGG/IGM EACH: CPT | Performed by: INTERNAL MEDICINE

## 2021-09-16 PROCEDURE — 85652 RBC SED RATE AUTOMATED: CPT | Performed by: INTERNAL MEDICINE

## 2021-09-16 PROCEDURE — G0463 HOSPITAL OUTPT CLINIC VISIT: HCPCS

## 2021-09-16 PROCEDURE — 99215 OFFICE O/P EST HI 40 MIN: CPT | Performed by: INTERNAL MEDICINE

## 2021-09-16 PROCEDURE — 36415 COLL VENOUS BLD VENIPUNCTURE: CPT | Performed by: INTERNAL MEDICINE

## 2021-09-16 PROCEDURE — 82040 ASSAY OF SERUM ALBUMIN: CPT | Performed by: INTERNAL MEDICINE

## 2021-09-16 PROCEDURE — 85025 COMPLETE CBC W/AUTO DIFF WBC: CPT | Performed by: INTERNAL MEDICINE

## 2021-09-16 RX ORDER — ATENOLOL 25 MG/1
TABLET ORAL
Qty: 270 TABLET | Refills: 4 | Status: SHIPPED | OUTPATIENT
Start: 2021-09-16 | End: 2022-11-03

## 2021-09-16 ASSESSMENT — PAIN SCALES - GENERAL: PAINLEVEL: MODERATE PAIN (4)

## 2021-09-16 ASSESSMENT — MIFFLIN-ST. JEOR: SCORE: 1037.05

## 2021-09-16 NOTE — LETTER
9/16/2021         RE: Amelia Michel  [address]  [place] [address]        Dear Colleague,    Thank you for referring your patient, Amelia Michel, to the [facility]LO[facility] AND [facility] TRANSPLANT PRO[facility] [place]. Please see a copy of my visit note below.    Hematology/ Oncology Follow Up Visit  March 16, 2021     Disease and Treatment History:  1. Complicated patient with history of Rheumatoid Arthritis status post long term treatment with methotrexate with recent significant complicated course with cardiac arrest, admission with hypotension, sepsis, ICU stay and intubation with subsequent additional readmission from TCU in 6/2017 for FUO with extensive work-up with eventual finding of progression cytopenias with eventual lymphoma diagnosis  2. Bone Marrow Biopsy: 7/12/2017: Highly suspicious for involvement by B cell lymphoma with foci of large atypical CD20+ cells  3. PET CT 7/19/2017: Extensive activity: Right paratracheal lesion with SUV 28, many liver lesions with SUV 15, cardiac lesion intraarterial septum, numerous bone lesions.  4. 7/21 Liver Biopsy: Consistent with Diffuse Large B Cell Lymphoma non-germinal center phenotype  -- FISH for myc, bcl2, bcl6 negative for double-hit lymphoma  5. IPI based on Age < 60 (0 points) + PS 2 (1 + point) + Stage IV Disease (1 point) + Extranodal disease > 1 site (1 point) + elevated LDH (1 point) = 4 Poor Risk Disease  6. Cycle 1 given as R-EPOCH Day 1 = 7/27/2017  -- complications of transfusion dependence and need for acute rehab placement (likely more result of extensive hospital stays)  - LP negative for CNS involvement 8/23/2017  7. Cycle #2: Transition to R-CHOP Day 1 = 8/22/2017  - Day 15 high dose MTX for CNS prophylaxis  - complicated by significant fluid overload and PICC associated DVT  8. Cycle #3 Day 1 = 9/20/2017 Post Cycle 3 PET CR. Cycle 4 10/11/2017 + IT prophylaxis, Cycle 5 11/8/17, Cycle 6 11/29/2017 + IT prophylaxis  --  "Post Treatment completion PET 1/15/2018: Complete Remission        HPI:    Today was my first time meeting Amelia.  She is a 60-year-old who was diagnosed with diffuse large B-cell lymphoma, likely immunosuppression related.  She was treated as outlined above.  She is now about 4 years out with a complete remission.  Overall, she feels well.  She has some issues with arthritis and cardiac issues.  She sees specialists for that.  She has no fevers, lumps or bumps, or night sweats.  She is trying to lose weight intentionally and has lost about 30 pounds.  Her mobility is somewhat limited by arthritis but she is active.    10 point ROS otherwise negative        Physical Exam:  /74 (BP Location: Right arm, Patient Position: Sitting, Cuff Size: Adult Regular)   Pulse 94   Temp 97.2  F (36.2  C) (Tympanic)   Resp 16   Ht 1.473 m (4' 9.99\")   Wt 57.7 kg (127 lb 4.8 oz)   SpO2 94%   BMI 26.61 kg/m       Gen: Alert, in NAD  Eyes: EOMI, sclera anicteric, PERRL  HENT      Head: NC/AT     Ears: No external auricular lesions     Nose/sinus: No rhinorrhea or epistaxis     Oral Cavity/Oropharynx: MMM, no thrush noted  Neck:  No LAD   Pulm: Breathing comfortably on room air, no audible wheezes or ronchi  CV: Well-perfused, no cyanosis  Abdominal: Soft, nondistended  Skin: Normal color and turgor  Psych: Appropriate mood and affect  Ext: HARRY stockings  Lymph node survey: No palpable cervical, axillary, or inguinal.  No splenomegaly.     I reviewed her labs.     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  She is currently on CR.  No signs of disease recurrence or progression.     1. DLBCL: s/p 1 REPOCH and 5 RCHOP. Now in complete remission. No evidence of relapse by history or physical exam.  We will see her next year with labs and physical exam.     2. Rheumatoid arthritis: She follows with " rheumatology.  She had some questions regarding Plaquenil and rituximab.  I recommended discussing this more thoroughly with the rheumatologist who knows her symptomatology better.  However if rituximab is an option, I would favor that given that we know that immunosuppression is a risk factor for lymphoma recurrence.     3. CV: A. Fib. Post pacemaker placement.  Follows with cardiology.  Is on optimal medical therapy.  Has been trying to lose weight and increase exercise.  Has been through cardiac rehab.  I renewed her atenolol today per her request.  She also wanted to check her digoxin level per her request.     4. Heme: Counts today are still pending     5. Neuropathy: on gabapentin. Stable. No worsening but no significant improvement either      40 minutes spent on the date of the encounter doing chart review, interpretation of results, patient visit, documentation and coordination of care.    RTC in 1 year.      Again, thank you for allowing me to participate in the care of your patient.        Sincerely,        Thai Heredia MD

## 2021-09-16 NOTE — NURSING NOTE
Chief Complaint   Patient presents with     Oncology Clinic Visit     [facility] RETURN - DLBCL     Blood Draw     labs drawn by rn in lab.  vital signs taken.     Labs drawn by venipuncture by RN in lab.  Vital signs taken.  Pt checked in to next appointment.    Monalisa Byrd RN

## 2021-09-16 NOTE — NURSING NOTE
"Oncology Rooming Note    September 16, 2021 2:24 PM   Amelia Michel is a 60 year old adult who presents for:    Chief Complaint   Patient presents with     Oncology Clinic Visit     [facility] RETURN - DLBCL     Initial Vitals: /74 (BP Location: Right arm, Patient Position: Sitting, Cuff Size: Adult Regular)   Pulse 94   Temp 97.2  F (36.2  C) (Tympanic)   Resp 16   Ht 1.473 m (4' 9.99\")   Wt 57.7 kg (127 lb 4.8 oz)   SpO2 94%   BMI 26.61 kg/m   Estimated body mass index is 26.61 kg/m  as calculated from the following:    Height as of this encounter: 1.473 m (4' 9.99\").    Weight as of this encounter: 57.7 kg (127 lb 4.8 oz). Body surface area is 1.54 meters squared.  Moderate Pain (4) Comment: Data Unavailable   No LMP recorded. Patient is postmenopausal.  Allergies reviewed: Yes  Medications reviewed: Yes    Medications: Medication refills not needed today.  Pharmacy name entered into [employer]: [id number] - [place], [place] - [address] AT [facility] [address]    Clinical concerns: No new concerns. Giovanna was notified.      Misael Dubose LPN            "

## 2021-09-17 LAB — IGG SERPL-MCNC: 1279 MG/DL (ref 610–1616)

## 2021-09-17 NOTE — PROGRESS NOTES
Hematology/ Oncology Follow Up Visit  March 16, 2021     Disease and Treatment History:  1. Complicated patient with history of Rheumatoid Arthritis status post long term treatment with methotrexate with recent significant complicated course with cardiac arrest, admission with hypotension, sepsis, ICU stay and intubation with subsequent additional readmission from TCU in 6/2017 for FUO with extensive work-up with eventual finding of progression cytopenias with eventual lymphoma diagnosis  2. Bone Marrow Biopsy: 7/12/2017: Highly suspicious for involvement by B cell lymphoma with foci of large atypical CD20+ cells  3. PET CT 7/19/2017: Extensive activity: Right paratracheal lesion with SUV 28, many liver lesions with SUV 15, cardiac lesion intraarterial septum, numerous bone lesions.  4. 7/21 Liver Biopsy: Consistent with Diffuse Large B Cell Lymphoma non-germinal center phenotype  -- FISH for myc, bcl2, bcl6 negative for double-hit lymphoma  5. IPI based on Age < 60 (0 points) + PS 2 (1 + point) + Stage IV Disease (1 point) + Extranodal disease > 1 site (1 point) + elevated LDH (1 point) = 4 Poor Risk Disease  6. Cycle 1 given as R-EPOCH Day 1 = 7/27/2017  -- complications of transfusion dependence and need for acute rehab placement (likely more result of extensive hospital stays)  - LP negative for CNS involvement 8/23/2017  7. Cycle #2: Transition to R-CHOP Day 1 = 8/22/2017  - Day 15 high dose MTX for CNS prophylaxis  - complicated by significant fluid overload and PICC associated DVT  8. Cycle #3 Day 1 = 9/20/2017 Post Cycle 3 PET CR. Cycle 4 10/11/2017 + IT prophylaxis, Cycle 5 11/8/17, Cycle 6 11/29/2017 + IT prophylaxis  -- Post Treatment completion PET 1/15/2018: Complete Remission        HPI:    Today was my first time meeting Amelia.  She is a 60-year-old who was diagnosed with diffuse large B-cell lymphoma, likely immunosuppression related.  She was treated as outlined above.  She is now about 4 years  "out with a complete remission.  Overall, she feels well.  She has some issues with arthritis and cardiac issues.  She sees specialists for that.  She has no fevers, lumps or bumps, or night sweats.  She is trying to lose weight intentionally and has lost about 30 pounds.  Her mobility is somewhat limited by arthritis but she is active.    10 point ROS otherwise negative        Physical Exam:  /74 (BP Location: Right arm, Patient Position: Sitting, Cuff Size: Adult Regular)   Pulse 94   Temp 97.2  F (36.2  C) (Tympanic)   Resp 16   Ht 1.473 m (4' 9.99\")   Wt 57.7 kg (127 lb 4.8 oz)   SpO2 94%   BMI 26.61 kg/m       Gen: Alert, in NAD  Eyes: EOMI, sclera anicteric, PERRL  HENT      Head: NC/AT     Ears: No external auricular lesions     Nose/sinus: No rhinorrhea or epistaxis     Oral Cavity/Oropharynx: MMM, no thrush noted  Neck:  No LAD   Pulm: Breathing comfortably on room air, no audible wheezes or ronchi  CV: Well-perfused, no cyanosis  Abdominal: Soft, nondistended  Skin: Normal color and turgor  Psych: Appropriate mood and affect  Ext: HARRY stockings  Lymph node survey: No palpable cervical, axillary, or inguinal.  No splenomegaly.     I reviewed her labs.     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  She is currently on CR.  No signs of disease recurrence or progression.     1. DLBCL: s/p 1 REPOCH and 5 RCHOP. Now in complete remission. No evidence of relapse by history or physical exam.  We will see her next year with labs and physical exam.     2. Rheumatoid arthritis: She follows with rheumatology.  She had some questions regarding Plaquenil and rituximab.  I recommended discussing this more thoroughly with the rheumatologist who knows her symptomatology better.  However if rituximab is an option, I would favor that given that we know that immunosuppression is a risk factor for " lymphoma recurrence.     3. CV: A. Fib. Post pacemaker placement.  Follows with cardiology.  Is on optimal medical therapy.  Has been trying to lose weight and increase exercise.  Has been through cardiac rehab.  I renewed her atenolol today per her request.  She also wanted to check her digoxin level per her request.     4. Heme: Counts today are still pending     5. Neuropathy: on gabapentin. Stable. No worsening but no significant improvement either      40 minutes spent on the date of the encounter doing chart review, interpretation of results, patient visit, documentation and coordination of care.    RTC in 1 year.

## 2021-10-08 DIAGNOSIS — I50.22 CHRONIC SYSTOLIC HEART FAILURE (H): Primary | ICD-10-CM

## 2021-10-13 ENCOUNTER — LAB (OUTPATIENT)
Dept: LAB | Facility: CLINIC | Age: 61
End: 2021-10-13
Payer: COMMERCIAL

## 2021-10-13 ENCOUNTER — OFFICE VISIT (OUTPATIENT)
Dept: CARDIOLOGY | Facility: CLINIC | Age: 61
End: 2021-10-13
Attending: NURSE PRACTITIONER
Payer: COMMERCIAL

## 2021-10-13 VITALS
SYSTOLIC BLOOD PRESSURE: 115 MMHG | OXYGEN SATURATION: 98 % | BODY MASS INDEX: 27.4 KG/M2 | WEIGHT: 127 LBS | HEIGHT: 57 IN | HEART RATE: 85 BPM | DIASTOLIC BLOOD PRESSURE: 78 MMHG

## 2021-10-13 DIAGNOSIS — I47.19 ATRIAL TACHYCARDIA (H): ICD-10-CM

## 2021-10-13 DIAGNOSIS — I50.22 CHRONIC SYSTOLIC HEART FAILURE (H): ICD-10-CM

## 2021-10-13 DIAGNOSIS — I50.22 CHRONIC SYSTOLIC HEART FAILURE (H): Primary | ICD-10-CM

## 2021-10-13 LAB
ANION GAP SERPL CALCULATED.3IONS-SCNC: 8 MMOL/L (ref 3–14)
BUN SERPL-MCNC: 29 MG/DL (ref 7–30)
CALCIUM SERPL-MCNC: 9.6 MG/DL (ref 8.5–10.1)
CHLORIDE BLD-SCNC: 100 MMOL/L (ref 94–109)
CO2 SERPL-SCNC: 27 MMOL/L (ref 20–32)
CREAT SERPL-MCNC: 1.15 MG/DL (ref 0.52–1.25)
GFR SERPL CREATININE-BSD FRML MDRD: 52 ML/MIN/1.73M2
GLUCOSE BLD-MCNC: 126 MG/DL (ref 70–99)
NT-PROBNP SERPL-MCNC: 1425 PG/ML (ref 0–125)
POTASSIUM BLD-SCNC: 4.2 MMOL/L (ref 3.4–5.3)
SODIUM SERPL-SCNC: 135 MMOL/L (ref 133–144)

## 2021-10-13 PROCEDURE — 80048 BASIC METABOLIC PNL TOTAL CA: CPT | Performed by: PATHOLOGY

## 2021-10-13 PROCEDURE — 83880 ASSAY OF NATRIURETIC PEPTIDE: CPT | Performed by: PATHOLOGY

## 2021-10-13 PROCEDURE — G0463 HOSPITAL OUTPT CLINIC VISIT: HCPCS

## 2021-10-13 PROCEDURE — 36415 COLL VENOUS BLD VENIPUNCTURE: CPT | Performed by: PATHOLOGY

## 2021-10-13 PROCEDURE — 99214 OFFICE O/P EST MOD 30 MIN: CPT | Performed by: NURSE PRACTITIONER

## 2021-10-13 ASSESSMENT — ENCOUNTER SYMPTOMS
DIFFICULTY URINATING: 0
WEIGHT GAIN: 0
SPEECH CHANGE: 0
INCREASED ENERGY: 1
DISTURBANCES IN COORDINATION: 1
POLYDIPSIA: 0
FATIGUE: 1
TREMORS: 1
LIGHT-HEADEDNESS: 0
ORTHOPNEA: 0
PALPITATIONS: 1
TINGLING: 1
BACK PAIN: 0
FLANK PAIN: 0
LOSS OF CONSCIOUSNESS: 0
ALTERED TEMPERATURE REGULATION: 1
POLYPHAGIA: 0
SYNCOPE: 0
HALLUCINATIONS: 0
HYPERTENSION: 1
SEIZURES: 0
FEVER: 0
JOINT SWELLING: 0
DYSURIA: 0
WEIGHT LOSS: 0
NUMBNESS: 1
MUSCLE CRAMPS: 1
MEMORY LOSS: 1
STIFFNESS: 1
MYALGIAS: 1
MUSCLE WEAKNESS: 1
DIZZINESS: 1
PARALYSIS: 0
HEMATURIA: 0
HYPOTENSION: 0
ARTHRALGIAS: 1
WEAKNESS: 1
EXERCISE INTOLERANCE: 1
LEG PAIN: 0
CHILLS: 0
HEADACHES: 0
DECREASED APPETITE: 0
NECK PAIN: 0

## 2021-10-13 ASSESSMENT — MIFFLIN-ST. JEOR: SCORE: 1019.95

## 2021-10-13 ASSESSMENT — PAIN SCALES - GENERAL: PAINLEVEL: NO PAIN (0)

## 2021-10-13 NOTE — PROGRESS NOTES
HPI:   Amelia Michel is a 60 year old adult with a past medical history including VSD s/p repair 1969, RA, HTN, Insomnia, Atrial Tachyarrhythmias, cardiac arrest, SSS s/p PPM 12/19, DLBCL, and exudative pericardial effusion. She presents for routine CORE return.      Her cardiac history dates back to 5/17 with atrial tachyarrhythmias with LVEF 40-45%. She underwent repeat hospitalization 6/17 due to VF arrest in setting of normal angiogram and found to have DLBCL complicated by masses on the coronary cusps and LVOT. She underwent R-EPOCH one cycle and 5 subsequent cycles of R-CHOP completed in 12/17 with her course complicated by pericardial effusion treated with colcichine. She has struggled with recurrent arrhythmia issues. Prior Cardiac MRI from 7611-0245 with normal EF. Since our last appointment she has undergone repeat Echo consistent with findings of HFPEF with EF 50% with adequately controlled HR, persistent moderate to severe TR.     She complains of persistent fatigue. She notes substantial improvement in palpitations. She denies fever, chills, lightheadedness, dizziness, chest pain, SOB, MONTALVO, PND, orthopnea, nausea, vomiting, diarrhea, hematochezia, melena, or LE edema. Her weight remains stable at 123-125 lbs. She continues to follow a low sodium diet.      PAST MEDICAL HISTORY:  Past Medical History:   Diagnosis Date     Arthritis      Atrial tachycardia (H)      Cardiac arrest (H)      Cardiac arrest (H)      Critical illness myopathy      Diffuse large B-cell lymphoma (H)      Embolism and thrombosis of unspecified artery (H)      History of blood clots 2017    PICC line Right arm      History of chemotherapy      History of transfusion      Hx antineoplastic chemotherapy     lymphoma     Hypertension      Hypertension      Lymphedema of both lower extremities      Neuropathy      Physical deconditioning      Positive PPD, 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     Thrombocytopenia (H)      VSD (ventricular septal defect)         FAMILY HISTORY:  Family History   Problem Relation Age of Onset     Breast Cancer Mother      Hypertension Mother      Alzheimer Disease Mother      Cerebrovascular Disease Mother      Hypertension Father      Cerebrovascular Disease Father      Atrial fibrillation Father      Lymphoma Father      Prostate Cancer Father      Diabetes Paternal Grandmother      Alzheimer Disease Maternal Grandmother         likely     Arthritis Maternal Grandfather      Pneumonia Maternal Grandfather        SOCIAL HISTORY:  Social History     Socioeconomic History     Marital status:      Spouse name: Romeo Michel     Number of children: 0     Years of education: None     Highest education level: None   Occupational History     Employer: [employer]   Tobacco Use     Smoking status: Never Smoker     Smokeless tobacco: Never Used   Substance and Sexual Activity     Alcohol use: Yes     Comment: none     Drug use: No     Sexual activity: None   Other Topics Concern     Parent/sibling w/ CABG, MI or angioplasty before 65F 55M? No   Social History Narrative     None     Social Determinants of Health     Financial Resource Strain:      Difficulty of Paying Living Expenses:    Food Insecurity:      Worried About Running Out of Food in the Last Year:      Ran Out of Food in the Last Year:    Transportation Needs:      Lack of Transportation (Medical):      Lack of Transportation (Non-Medical):    Physical Activity:      Days of Exercise per Week:      Minutes of Exercise per Session:    Stress:      Feeling of Stress :    Social Connections:      Frequency of Communication with Friends and Family:      Frequency of Social Gatherings with Friends and Family:      Attends [employer] Services:      Active Member of Clubs or Organizations:      Attends Club or Organization Meetings:      Marital Status:    Intimate Partner Violence:      Fear of Current or Ex-Partner:      Emotionally Abused:      Physically  Abused:      Sexually Abused:        CURRENT MEDICATIONS:  Outpatient Medications Prior to Visit   Medication Sig Dispense Refill     acetaminophen (TYLENOL) 500 MG tablet Take 500 mg by mouth every 6 hours as needed for mild pain       apixaban ANTICOAGULANT (ELIQUIS ANTICOAGULANT) 5 MG tablet Take 1 tablet (5 mg) by mouth 2 times daily 180 tablet 3     Ascorbic Acid (VITAMIN C) 500 MG CAPS        atenolol (TENORMIN) 25 MG tablet 50 mg in AM and 25 mg in the evening 270 tablet 4     calcium carbonate 600 mg-vitamin D 400 units (CALTRATE) 600-400 MG-UNIT per tablet Take 2 tablets by mouth daily       digoxin (LANOXIN) 125 MCG tablet Take 1 tablet (125 mcg) by mouth daily 90 tablet 3     furosemide (LASIX) 20 MG tablet Take 1 tablet (20 mg) by mouth daily 90 tablet 3     gabapentin (NEURONTIN) 100 MG capsule TAKE 2 CAPSULES(200 MG) BY MOUTH THREE TIMES DAILY 540 capsule 1     hydroxychloroquine (PLAQUENIL) 200 MG tablet Take 1 tablet (200 mg) by mouth daily Overdue for annual eye exam 90 tablet 2     lisinopril (ZESTRIL) 2.5 MG tablet Take 1 tablet (2.5 mg) by mouth daily 90 tablet 3     magnesium 250 MG tablet Take 1 tablet by mouth At Bedtime       multivitamin, therapeutic with minerals (THERA-VIT-M) TABS tablet Take 1 tablet by mouth daily 30 each 0     predniSONE (DELTASONE) 5 MG tablet Take 1 tablet (5 mg) by mouth daily 90 tablet 3     spironolactone (ALDACTONE) 25 MG tablet Take 1 tablet (25 mg) by mouth daily 90 tablet 3     STATIN NOT PRESCRIBED (INTENTIONAL) Please choose reason not prescribed, below (Patient not taking: Reported on 10/9/2020)       No facility-administered medications prior to visit.       ROS:   CONSTITUTIONAL: Denies fever, chills, fatigue, or weight fluctuations.   HEENT: Denies headache, vision changes, and changes in speech.   CV: Refer to HPI.   PULMONARY:Denies shortness of breath, cough, or previous TB exposure.   GI:Denies nausea, vomiting, diarrhea, and abdominal pain. Bowel  "movements are regular.   :Denies urinary alterations, dysuria, urinary frequency, hematuria, and abnormal drainage.   EXT:Denies lower extremity edema.   SKIN:Denies abnormal rashes or lesions.   MUSCULOSKELETAL:Denies upper or lower extremity weakness and pain.   NEUROLOGIC:Denies lightheadedness, dizziness, seizures, or upper or lower extremity paresthesia.     EXAM:  /78 (BP Location: Right arm, Patient Position: Chair, Cuff Size: Adult Regular)   Pulse 85   Ht 1.448 m (4' 9\")   Wt 57.6 kg (127 lb)   SpO2 98%   BMI 27.48 kg/m    GENERAL: Appears alert and oriented times three.   HEENT: Eye symmetrical and free of discharge bilaterally. Mucous membranes moist and without lesions.  NECK: Supple and without lymphadenopathy. JVD at clavicular line.   CV: RRR, S1S2 present without murmur, rub, or gallop.   RESPIRATORY: Respirations regular, even, and unlabored. Lungs CTA throughout.   GI: Soft and non distended with normoactive bowel sounds present in all quadrants. No tenderness, rebound, guarding. No organomegaly.   EXTREMITIES: No peripheral edema. 2+ bilateral pedal pulses.   NEUROLOGIC: Alert and orientated x 3. CN II-XII grossly intact. No focal deficits.   MUSCULOSKELETAL: No joint swelling or tenderness.   SKIN: No jaundice. No rashes or lesions.     The following Labs were reviewed today:  CBC RESULTS:  Lab Results   Component Value Date    WBC 7.6 09/16/2021    WBC Canceled, Test credited 03/16/2021    RBC 4.06 09/16/2021    RBC Canceled, Test credited 03/16/2021    HGB 14.4 09/16/2021    HGB Canceled, Test credited 03/16/2021    HCT 42.0 09/16/2021    HCT Canceled, Test credited 03/16/2021     (H) 09/16/2021    MCV Canceled, Test credited 03/16/2021    MCH 35.5 (H) 09/16/2021    MCH Canceled, Test credited 03/16/2021    MCHC 34.3 09/16/2021    MCHC Canceled, Test credited 03/16/2021    RDW 13.4 09/16/2021    RDW Canceled, Test credited 03/16/2021     09/16/2021    PLT Canceled, " Test credited 03/16/2021       CMP RESULTS:  Lab Results   Component Value Date     10/13/2021     06/23/2021    POTASSIUM 4.2 10/13/2021    POTASSIUM 4.2 06/23/2021    CHLORIDE 100 10/13/2021    CHLORIDE 102 06/23/2021    CO2 27 10/13/2021    CO2 28 06/23/2021    ANIONGAP 8 10/13/2021    ANIONGAP 6 06/23/2021     (H) 10/13/2021    GLC 98 06/23/2021    BUN 29 10/13/2021    BUN 25 06/23/2021    CR 1.15 10/13/2021    CR 1.05 (H) 06/23/2021    GFRESTIMATED 52 (L) 10/13/2021    GFRESTIMATED 57 (L) 06/23/2021    GFRESTBLACK 67 06/23/2021    VIKTORIYA 9.6 10/13/2021    VIKTORIYA 10.0 06/23/2021    BILITOTAL 1.3 09/16/2021    BILITOTAL 1.2 06/23/2021    ALBUMIN 4.1 09/16/2021    ALBUMIN 4.2 06/23/2021    ALKPHOS 133 09/16/2021    ALKPHOS 136 06/23/2021    ALT 37 09/16/2021    ALT 41 06/23/2021    AST 28 09/16/2021    AST 36 06/23/2021        INR RESULTS:  Lab Results   Component Value Date    INR 3.42 (H) 03/07/2018       Lab Results   Component Value Date    MAG 1.9 04/12/2018     Lab Results   Component Value Date    NTBNPI 485 12/12/2019     Lab Results   Component Value Date    NTBNP 1,274 (H) 05/26/2021       Diagnostics:  Echo 10/9/20:   Interpretation Summary  VSD s/p repair in 1969  Global and regional left ventricular function is normal with an EF of 60-65%.  No flow acceleration is seen across the septum.  Global right ventricular function is normal. Mild right ventricular dilation  is present.  There is moderate tricuspid regurgitation.  The estimated PA systolic pressure is 32 mmHg but this is likely to be  underestimated due to the presence of multiple jets.  This study was compared with the study from 09/11/2017. There has been no  change in the severity of the tricuspid regurgitation or the RV size/function.     Device check 5/26:  Device: [employer] [id number] Claudia XT DR MRI  Normal device function  Mode: AAIR<=>DDDR 60 - 130 bpm  AP: 0.8%  : 2.8%  Intrinsic Rhythm: AFL w/ VS 85 - 125 bpm  Short V-V  intervals: none  Lead Trends Appear Stable: yes  Estimated battery longevity to RRT = 13.3 years.   AF Anderson = 90.9%  Anticoagulant: Eliquis  Ventricular Arrhythmia: no new episodes recorded since last remote transmission on 5/18/2021, 1 episode that was recorded as SVT on May 2, 2021 displays what appears to be VT lasting 21 sec @ 200 bpm. Reviewed with Dr. Eaton.   Setting Changes: none     Echo 6/16/21:   Interpretation Summary  H/O VSD repair in 1969.  LVEF 50% based on biplane 2D tracing.  Diastolic function not assessed due to atrial fibrillation.  Global right ventricular function is mildly reduced.  Severe biatrial enlargement is present.  Moderate to severe tricuspid insufficiency is present.  Pulmonary artery systolic pressure is normal.  The inferior vena cava is normal.  No pericardial effusion is present.    Echo 8/25/21:   Interpretation Summary  H/O of VSD repair in 1969  The visual ejection fraction is 60-65%. No wall motion abnormalities. Global  right ventricular function is mildly reduced.  Moderate tricuspid insufficiency is present.  There is a right ventricular right atrial pressure difference of 24mmHg.  IVC diameter >2.1 cm collapsing <50% with sniff suggests a high RA pressure  estimated at 15 mmHg or greater.  No pericardial effusion is present.     Assessment and Plan:   Marilu is a 60 year old adult with a past medical history including VSD s/p repair 1969, RA, HTN, Insomnia, Atrial Tachyarrhythmias, cardiac arrest, SSS s/p PPM 12/19, DLBCL, and exudative pericardial effusion. She presents to CORE clinic for routine follow up at euvolemic state.       HFpEF. Note prior echo with reduced EF with uncontrolled HR likely inaccurate representation of EF. Findings on Echo with controlled HR consistent with HFpEF.   NYHA Class III, AHA/ACC Stage C  Rate control: 85  volume status: Euvolemic  Blood pressure control: Controlled  Aldosterone antagonist: Aldactone 25 mg po daily, will obtain BNP  today.   Evaluation of coronary arteries: 6/17 NCA per Angiogram     HTN. Intolerant to Metoprolol in the past.   - Atenolol 50 mg in AM and 25 mg in the evening.   - Lisinopril 2.5 mg po daily      Aflutter. History of SSS s/p PPM 12/19. Long standing history of atrial arrythmias.  - She will send remote transition today.   - Continue Digoxin 125 mcg po daily, will get Dig level next Friday.   - Atenolol as above.   - Continue Eliquis.   - Follow up with EP as recommended.      VSD s/p repair.      History of exudative pericardial effusion.   - Stable per CT 3/19.     Moderate to severe TR. Stable per follow up Echo 8/25/21.   - Continue to monitor symptoms.      Follow up in BNP today, Dig level Friday, and in CORE in 6 months.       Halle Vasquez, APRN CNP  10/13/2021          CC

## 2021-10-13 NOTE — PATIENT INSTRUCTIONS
Take your medicines every day, as directed    Changes made today:  o No changes  o Labs were added on. Also get Digoxin level next week  o    Monitor Your Weight and Symptoms    Contact us if you:      Gain 2 pounds in one day or 5 pounds in one week    Feel more short of breath    Notice more leg swelling    Feel lightheadeded   Change your lifestyle    Limit Salt or Sodium:    2000 mg  Limit Fluids:    2000 mL or approximately 64 ounces  Eat a Heart Healthy Diet    Low in saturated fats  Stay Active:    Aim to move at least 150 minutes every  week         To Contact us    During Business Hours:  [phone], option # 1 (University)  Then option # 4 (medical questions)     After hours, weekends or holidays:   [phone], Option #4  Ask to speak to the On-Call Cardiologist. Inform them you are a CORE/heart failure patient at the [facility].     Use [employer] allows you to communicate directly with your heart team through secure messaging.    [employer] can be accessed any time on your phone, computer, or tablet.    If you need assistance, we'd be happy to help!         Keep your Heart Appointments:    Follow up in CORE in 6 months.

## 2021-10-13 NOTE — NURSING NOTE
Chief Complaint   Patient presents with     Follow Up     2 month     Vitals were taken and medications were reconciled.   SHAWN Christy  9:48 AM

## 2021-10-13 NOTE — LETTER
10/13/2021      RE: Amelia Michel  [address]  [facility] [address]       HPI:   Amelia Michel is a 60 year old adult with a past medical history including VSD s/p repair 1969, RA, HTN, Insomnia, Atrial Tachyarrhythmias, cardiac arrest, SSS s/p PPM 12/19, DLBCL, and exudative pericardial effusion. She presents for routine CORE return.      Her cardiac history dates back to 5/17 with atrial tachyarrhythmias with LVEF 40-45%. She underwent repeat hospitalization 6/17 due to VF arrest in setting of normal angiogram and found to have DLBCL complicated by masses on the coronary cusps and LVOT. She underwent R-EPOCH one cycle and 5 subsequent cycles of R-CHOP completed in 12/17 with her course complicated by pericardial effusion treated with colcichine. She has struggled with recurrent arrhythmia issues. Prior Cardiac MRI from [id number] with normal EF. Since our last appointment she has undergone repeat Echo consistent with findings of HFPEF with EF 50% with adequately controlled HR, persistent moderate to severe TR.     She complains of persistent fatigue. She notes substantial improvement in palpitations. She denies fever, chills, lightheadedness, dizziness, chest pain, SOB, MONTALVO, PND, orthopnea, nausea, vomiting, diarrhea, hematochezia, melena, or LE edema. Her weight remains stable at 123-125 lbs. She continues to follow a low sodium diet.      PAST MEDICAL HISTORY:  Past Medical History:   Diagnosis Date     Arthritis      Atrial tachycardia (H)      Cardiac arrest (H)      Cardiac arrest (H)      Critical illness myopathy      Diffuse large B-cell lymphoma (H)      Embolism and thrombosis of unspecified artery (H)      History of blood clots 2017    PICC line Right arm      History of chemotherapy      History of transfusion      Hx antineoplastic chemotherapy     lymphoma     Hypertension      Hypertension      Lymphedema of both lower extremities      Neuropathy      Physical deconditioning       Positive PPD, 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     Thrombocytopenia (H)      VSD (ventricular septal defect)        FAMILY HISTORY:  Family History   Problem Relation Age of Onset     Breast Cancer Mother      Hypertension Mother      Alzheimer Disease Mother      Cerebrovascular Disease Mother      Hypertension Father      Cerebrovascular Disease Father      Atrial fibrillation Father      Lymphoma Father      Prostate Cancer Father      Diabetes Paternal Grandmother      Alzheimer Disease Maternal Grandmother         likely     Arthritis Maternal Grandfather      Pneumonia Maternal Grandfather        SOCIAL HISTORY:  Social History     Socioeconomic History     Marital status:      Spouse name: Romeo Michel     Number of children: 0     Years of education: None     Highest education level: None   Occupational History     Employer: [employer]   Tobacco Use     Smoking status: Never Smoker     Smokeless tobacco: Never Used   Substance and Sexual Activity     Alcohol use: Yes     Comment: none     Drug use: No     Sexual activity: None   Other Topics Concern     Parent/sibling w/ CABG, MI or angioplasty before 65F 55M? No   Social History Narrative     None     Social Determinants of Health     Financial Resource Strain:      Difficulty of Paying Living Expenses:    Food Insecurity:      Worried About Running Out of Food in the Last Year:      Ran Out of Food in the Last Year:    Transportation Needs:      Lack of Transportation (Medical):      Lack of Transportation (Non-Medical):    Physical Activity:      Days of Exercise per Week:      Minutes of Exercise per Session:    Stress:      Feeling of Stress :    Social Connections:      Frequency of Communication with Friends and Family:      Frequency of Social Gatherings with Friends and Family:      Attends Mu-ism Services:      Active Member of Clubs or Organizations:      Attends Club or Organization Meetings:      Marital Status:    Intimate Partner  Violence:      Fear of Current or Ex-Partner:      Emotionally Abused:      Physically Abused:      Sexually Abused:        CURRENT MEDICATIONS:  Outpatient Medications Prior to Visit   Medication Sig Dispense Refill     acetaminophen (TYLENOL) 500 MG tablet Take 500 mg by mouth every 6 hours as needed for mild pain       apixaban ANTICOAGULANT (ELIQUIS ANTICOAGULANT) 5 MG tablet Take 1 tablet (5 mg) by mouth 2 times daily 180 tablet 3     Ascorbic Acid (VITAMIN C) 500 MG CAPS        atenolol (TENORMIN) 25 MG tablet 50 mg in AM and 25 mg in the evening 270 tablet 4     calcium carbonate 600 mg-vitamin D 400 units (CALTRATE) 600-400 MG-UNIT per tablet Take 2 tablets by mouth daily       digoxin (LANOXIN) 125 MCG tablet Take 1 tablet (125 mcg) by mouth daily 90 tablet 3     furosemide (LASIX) 20 MG tablet Take 1 tablet (20 mg) by mouth daily 90 tablet 3     gabapentin (NEURONTIN) 100 MG capsule TAKE 2 CAPSULES(200 MG) BY MOUTH THREE TIMES DAILY 540 capsule 1     hydroxychloroquine (PLAQUENIL) 200 MG tablet Take 1 tablet (200 mg) by mouth daily Overdue for annual eye exam 90 tablet 2     lisinopril (ZESTRIL) 2.5 MG tablet Take 1 tablet (2.5 mg) by mouth daily 90 tablet 3     magnesium 250 MG tablet Take 1 tablet by mouth At Bedtime       multivitamin, therapeutic with minerals (THERA-VIT-M) TABS tablet Take 1 tablet by mouth daily 30 each 0     predniSONE (DELTASONE) 5 MG tablet Take 1 tablet (5 mg) by mouth daily 90 tablet 3     spironolactone (ALDACTONE) 25 MG tablet Take 1 tablet (25 mg) by mouth daily 90 tablet 3     STATIN NOT PRESCRIBED (INTENTIONAL) Please choose reason not prescribed, below (Patient not taking: Reported on 10/9/2020)       No facility-administered medications prior to visit.       ROS:   CONSTITUTIONAL: Denies fever, chills, fatigue, or weight fluctuations.   HEENT: Denies headache, vision changes, and changes in speech.   CV: Refer to HPI.   PULMONARY:Denies shortness of breath, cough, or  "previous TB exposure.   GI:Denies nausea, vomiting, diarrhea, and abdominal pain. Bowel movements are regular.   :Denies urinary alterations, dysuria, urinary frequency, hematuria, and abnormal drainage.   EXT:Denies lower extremity edema.   SKIN:Denies abnormal rashes or lesions.   MUSCULOSKELETAL:Denies upper or lower extremity weakness and pain.   NEUROLOGIC:Denies lightheadedness, dizziness, seizures, or upper or lower extremity paresthesia.     EXAM:  /78 (BP Location: Right arm, Patient Position: Chair, Cuff Size: Adult Regular)   Pulse 85   Ht 1.448 m (4' 9\")   Wt 57.6 kg (127 lb)   SpO2 98%   BMI 27.48 kg/m    GENERAL: Appears alert and oriented times three.   HEENT: Eye symmetrical and free of discharge bilaterally. Mucous membranes moist and without lesions.  NECK: Supple and without lymphadenopathy. JVD at clavicular line.   CV: RRR, S1S2 present without murmur, rub, or gallop.   RESPIRATORY: Respirations regular, even, and unlabored. Lungs CTA throughout.   GI: Soft and non distended with normoactive bowel sounds present in all quadrants. No tenderness, rebound, guarding. No organomegaly.   EXTREMITIES: No peripheral edema. 2+ bilateral pedal pulses.   NEUROLOGIC: Alert and orientated x 3. CN II-XII grossly intact. No focal deficits.   MUSCULOSKELETAL: No joint swelling or tenderness.   SKIN: No jaundice. No rashes or lesions.     The following Labs were reviewed today:  CBC RESULTS:  Lab Results   Component Value Date    WBC 7.6 09/16/2021    WBC Canceled, Test credited 03/16/2021    RBC 4.06 09/16/2021    RBC Canceled, Test credited 03/16/2021    HGB 14.4 09/16/2021    HGB Canceled, Test credited 03/16/2021    HCT 42.0 09/16/2021    HCT Canceled, Test credited 03/16/2021     (H) 09/16/2021    MCV Canceled, Test credited 03/16/2021    MCH 35.5 (H) 09/16/2021    MCH Canceled, Test credited 03/16/2021    MCHC 34.3 09/16/2021    MCHC Canceled, Test credited 03/16/2021    RDW 13.4 " 09/16/2021    RDW Canceled, Test credited 03/16/2021     09/16/2021    PLT Canceled, Test credited 03/16/2021       CMP RESULTS:  Lab Results   Component Value Date     10/13/2021     06/23/2021    POTASSIUM 4.2 10/13/2021    POTASSIUM 4.2 06/23/2021    CHLORIDE 100 10/13/2021    CHLORIDE 102 06/23/2021    CO2 27 10/13/2021    CO2 28 06/23/2021    ANIONGAP 8 10/13/2021    ANIONGAP 6 06/23/2021     (H) 10/13/2021    GLC 98 06/23/2021    BUN 29 10/13/2021    BUN 25 06/23/2021    CR 1.15 10/13/2021    CR 1.05 (H) 06/23/2021    GFRESTIMATED 52 (L) 10/13/2021    GFRESTIMATED 57 (L) 06/23/2021    GFRESTBLACK 67 06/23/2021    VIKTORIYA 9.6 10/13/2021    VIKTORIYA 10.0 06/23/2021    BILITOTAL 1.3 09/16/2021    BILITOTAL 1.2 06/23/2021    ALBUMIN 4.1 09/16/2021    ALBUMIN 4.2 06/23/2021    ALKPHOS 133 09/16/2021    ALKPHOS 136 06/23/2021    ALT 37 09/16/2021    ALT 41 06/23/2021    AST 28 09/16/2021    AST 36 06/23/2021        INR RESULTS:  Lab Results   Component Value Date    INR 3.42 (H) 03/07/2018       Lab Results   Component Value Date    MAG 1.9 04/12/2018     Lab Results   Component Value Date    NTBNPI 485 12/12/2019     Lab Results   Component Value Date    NTBNP 1,274 (H) 05/26/2021       Diagnostics:  Echo 10/9/20:   Interpretation Summary  VSD s/p repair in 1969  Global and regional left ventricular function is normal with an EF of 60-65%.  No flow acceleration is seen across the septum.  Global right ventricular function is normal. Mild right ventricular dilation  is present.  There is moderate tricuspid regurgitation.  The estimated PA systolic pressure is 32 mmHg but this is likely to be  underestimated due to the presence of multiple jets.  This study was compared with the study from 09/11/2017. There has been no  change in the severity of the tricuspid regurgitation or the RV size/function.     Device check 5/26:  Device: [email or web address] [id number] Claudia XT DR GALLEGOS  Normal device function  Mode:  AAIR<=>DDDR 60 - 130 bpm  AP: 0.8%  : 2.8%  Intrinsic Rhythm: AFL w/ VS 85 - 125 bpm  Short V-V intervals: none  Lead Trends Appear Stable: yes  Estimated battery longevity to RRT = 13.3 years.   AF [place] = 90.9%  Anticoagulant: Eliquis  Ventricular Arrhythmia: no new episodes recorded since last remote transmission on 5/18/2021, 1 episode that was recorded as SVT on May 2, 2021 displays what appears to be VT lasting 21 sec @ 200 bpm. Reviewed with Dr. Eaton.   Setting Changes: none     Echo 6/16/21:   Interpretation Summary  H/O VSD repair in 1969.  LVEF 50% based on biplane 2D tracing.  Diastolic function not assessed due to atrial fibrillation.  Global right ventricular function is mildly reduced.  Severe biatrial enlargement is present.  Moderate to severe tricuspid insufficiency is present.  Pulmonary artery systolic pressure is normal.  The inferior vena cava is normal.  No pericardial effusion is present.    Echo 8/25/21:   Interpretation Summary  H/O of VSD repair in 1969  The visual ejection fraction is 60-65%. No wall motion abnormalities. Global  right ventricular function is mildly reduced.  Moderate tricuspid insufficiency is present.  There is a right ventricular right atrial pressure difference of 24mmHg.  IVC diameter >2.1 cm collapsing <50% with sniff suggests a high RA pressure  estimated at 15 mmHg or greater.  No pericardial effusion is present.     Assessment and Plan:   Marilu is a 60 year old adult with a past medical history including VSD s/p repair 1969, RA, HTN, Insomnia, Atrial Tachyarrhythmias, cardiac arrest, SSS s/p PPM 12/19, DLBCL, and exudative pericardial effusion. She presents to CORE clinic for routine follow up at euvolemic state.       HFpEF. Note prior echo with reduced EF with uncontrolled HR likely inaccurate representation of EF. Findings on Echo with controlled HR consistent with HFpEF.   NYHA Class III, AHA/ACC Stage C  Rate control: 85  volume status: Euvolemic  Blood  pressure control: Controlled  Aldosterone antagonist: Aldactone 25 mg po daily, will obtain BNP today.   Evaluation of coronary arteries: 6/17 NCA per Angiogram     HTN. Intolerant to Metoprolol in the past.   - Atenolol 50 mg in AM and 25 mg in the evening.   - Lisinopril 2.5 mg po daily      Aflutter. History of SSS s/p PPM 12/19. Long standing history of atrial arrythmias.  - She will send remote transition today.   - Continue Digoxin 125 mcg po daily, will get Dig level next Friday.   - Atenolol as above.   - Continue Eliquis.   - Follow up with EP as recommended.      VSD s/p repair.      History of exudative pericardial effusion.   - Stable per CT 3/19.     Moderate to severe TR. Stable per follow up Echo 8/25/21.   - Continue to monitor symptoms.      Follow up in BNP today, Dig level Friday, and in CORE in 6 months.       Halle Vasquez, ELIZABETH CNP  10/13/2021

## 2021-10-13 NOTE — LETTER
10/13/2021      RE: Amelia Michel  [address]  [facility] [address]       Dear Colleague,    Thank you for the opportunity to participate in the care of your patient, Amelia Michel, at the [facility] [place] at [facility]. Please see a copy of my visit note below.    HPI:   Amelia Michel is a 60 year old adult with a past medical history including VSD s/p repair 1969, RA, HTN, Insomnia, Atrial Tachyarrhythmias, cardiac arrest, SSS s/p PPM 12/19, DLBCL, and exudative pericardial effusion. She presents for routine CORE return.      Her cardiac history dates back to 5/17 with atrial tachyarrhythmias with LVEF 40-45%. She underwent repeat hospitalization 6/17 due to VF arrest in setting of normal angiogram and found to have DLBCL complicated by masses on the coronary cusps and LVOT. She underwent R-EPOCH one cycle and 5 subsequent cycles of R-CHOP completed in 12/17 with her course complicated by pericardial effusion treated with colcichine. She has struggled with recurrent arrhythmia issues. Prior Cardiac MRI from [id number] with normal EF. Since our last appointment she has undergone repeat Echo consistent with findings of HFPEF with EF 50% with adequately controlled HR, persistent moderate to severe TR.     She complains of persistent fatigue. She notes substantial improvement in palpitations. She denies fever, chills, lightheadedness, dizziness, chest pain, SOB, MONTALVO, PND, orthopnea, nausea, vomiting, diarrhea, hematochezia, melena, or LE edema. Her weight remains stable at 123-125 lbs. She continues to follow a low sodium diet.      PAST MEDICAL HISTORY:  Past Medical History:   Diagnosis Date     Arthritis      Atrial tachycardia (H)      Cardiac arrest (H)      Cardiac arrest (H)      Critical illness myopathy      Diffuse large B-cell lymphoma (H)      Embolism and thrombosis of unspecified artery (H)      History of blood clots 2017     PICC line Right arm      History of chemotherapy      History of transfusion      Hx antineoplastic chemotherapy     lymphoma     Hypertension      Hypertension      Lymphedema of both lower extremities      Neuropathy      Physical deconditioning      Positive PPD, 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     Thrombocytopenia (H)      VSD (ventricular septal defect)        FAMILY HISTORY:  Family History   Problem Relation Age of Onset     Breast Cancer Mother      Hypertension Mother      Alzheimer Disease Mother      Cerebrovascular Disease Mother      Hypertension Father      Cerebrovascular Disease Father      Atrial fibrillation Father      Lymphoma Father      Prostate Cancer Father      Diabetes Paternal Grandmother      Alzheimer Disease Maternal Grandmother         likely     Arthritis Maternal Grandfather      Pneumonia Maternal Grandfather        SOCIAL HISTORY:  Social History     Socioeconomic History     Marital status:      Spouse name: Romeo Michel     Number of children: 0     Years of education: None     Highest education level: None   Occupational History     Employer: [employer]   Tobacco Use     Smoking status: Never Smoker     Smokeless tobacco: Never Used   Substance and Sexual Activity     Alcohol use: Yes     Comment: none     Drug use: No     Sexual activity: None   Other Topics Concern     Parent/sibling w/ CABG, MI or angioplasty before 65F 55M? No   Social History Narrative     None     Social Determinants of Health     Financial Resource Strain:      Difficulty of Paying Living Expenses:    Food Insecurity:      Worried About Running Out of Food in the Last Year:      Ran Out of Food in the Last Year:    Transportation Needs:      Lack of Transportation (Medical):      Lack of Transportation (Non-Medical):    Physical Activity:      Days of Exercise per Week:      Minutes of Exercise per Session:    Stress:      Feeling of Stress :    Social Connections:      Frequency of  Communication with Friends and Family:      Frequency of Social Gatherings with Friends and Family:      Attends Nondenominational Services:      Active Member of Clubs or Organizations:      Attends Club or Organization Meetings:      Marital Status:    Intimate Partner Violence:      Fear of Current or Ex-Partner:      Emotionally Abused:      Physically Abused:      Sexually Abused:        CURRENT MEDICATIONS:  Outpatient Medications Prior to Visit   Medication Sig Dispense Refill     acetaminophen (TYLENOL) 500 MG tablet Take 500 mg by mouth every 6 hours as needed for mild pain       apixaban ANTICOAGULANT (ELIQUIS ANTICOAGULANT) 5 MG tablet Take 1 tablet (5 mg) by mouth 2 times daily 180 tablet 3     Ascorbic Acid (VITAMIN C) 500 MG CAPS        atenolol (TENORMIN) 25 MG tablet 50 mg in AM and 25 mg in the evening 270 tablet 4     calcium carbonate 600 mg-vitamin D 400 units (CALTRATE) 600-400 MG-UNIT per tablet Take 2 tablets by mouth daily       digoxin (LANOXIN) 125 MCG tablet Take 1 tablet (125 mcg) by mouth daily 90 tablet 3     furosemide (LASIX) 20 MG tablet Take 1 tablet (20 mg) by mouth daily 90 tablet 3     gabapentin (NEURONTIN) 100 MG capsule TAKE 2 CAPSULES(200 MG) BY MOUTH THREE TIMES DAILY 540 capsule 1     hydroxychloroquine (PLAQUENIL) 200 MG tablet Take 1 tablet (200 mg) by mouth daily Overdue for annual eye exam 90 tablet 2     lisinopril (ZESTRIL) 2.5 MG tablet Take 1 tablet (2.5 mg) by mouth daily 90 tablet 3     magnesium 250 MG tablet Take 1 tablet by mouth At Bedtime       multivitamin, therapeutic with minerals (THERA-VIT-M) TABS tablet Take 1 tablet by mouth daily 30 each 0     predniSONE (DELTASONE) 5 MG tablet Take 1 tablet (5 mg) by mouth daily 90 tablet 3     spironolactone (ALDACTONE) 25 MG tablet Take 1 tablet (25 mg) by mouth daily 90 tablet 3     STATIN NOT PRESCRIBED (INTENTIONAL) Please choose reason not prescribed, below (Patient not taking: Reported on 10/9/2020)       No  "facility-administered medications prior to visit.       ROS:   CONSTITUTIONAL: Denies fever, chills, fatigue, or weight fluctuations.   HEENT: Denies headache, vision changes, and changes in speech.   CV: Refer to HPI.   PULMONARY:Denies shortness of breath, cough, or previous TB exposure.   GI:Denies nausea, vomiting, diarrhea, and abdominal pain. Bowel movements are regular.   :Denies urinary alterations, dysuria, urinary frequency, hematuria, and abnormal drainage.   EXT:Denies lower extremity edema.   SKIN:Denies abnormal rashes or lesions.   MUSCULOSKELETAL:Denies upper or lower extremity weakness and pain.   NEUROLOGIC:Denies lightheadedness, dizziness, seizures, or upper or lower extremity paresthesia.     EXAM:  /78 (BP Location: Right arm, Patient Position: Chair, Cuff Size: Adult Regular)   Pulse 85   Ht 1.448 m (4' 9\")   Wt 57.6 kg (127 lb)   SpO2 98%   BMI 27.48 kg/m    GENERAL: Appears alert and oriented times three.   HEENT: Eye symmetrical and free of discharge bilaterally. Mucous membranes moist and without lesions.  NECK: Supple and without lymphadenopathy. JVD at clavicular line.   CV: RRR, S1S2 present without murmur, rub, or gallop.   RESPIRATORY: Respirations regular, even, and unlabored. Lungs CTA throughout.   GI: Soft and non distended with normoactive bowel sounds present in all quadrants. No tenderness, rebound, guarding. No organomegaly.   EXTREMITIES: No peripheral edema. 2+ bilateral pedal pulses.   NEUROLOGIC: Alert and orientated x 3. CN II-XII grossly intact. No focal deficits.   MUSCULOSKELETAL: No joint swelling or tenderness.   SKIN: No jaundice. No rashes or lesions.     The following Labs were reviewed today:  CBC RESULTS:  Lab Results   Component Value Date    WBC 7.6 09/16/2021    WBC Canceled, Test credited 03/16/2021    RBC 4.06 09/16/2021    RBC Canceled, Test credited 03/16/2021    HGB 14.4 09/16/2021    HGB Canceled, Test credited 03/16/2021    HCT 42.0 " 09/16/2021    HCT Canceled, Test credited 03/16/2021     (H) 09/16/2021    MCV Canceled, Test credited 03/16/2021    MCH 35.5 (H) 09/16/2021    MCH Canceled, Test credited 03/16/2021    MCHC 34.3 09/16/2021    MCHC Canceled, Test credited 03/16/2021    RDW 13.4 09/16/2021    RDW Canceled, Test credited 03/16/2021     09/16/2021    PLT Canceled, Test credited 03/16/2021       CMP RESULTS:  Lab Results   Component Value Date     10/13/2021     06/23/2021    POTASSIUM 4.2 10/13/2021    POTASSIUM 4.2 06/23/2021    CHLORIDE 100 10/13/2021    CHLORIDE 102 06/23/2021    CO2 27 10/13/2021    CO2 28 06/23/2021    ANIONGAP 8 10/13/2021    ANIONGAP 6 06/23/2021     (H) 10/13/2021    GLC 98 06/23/2021    BUN 29 10/13/2021    BUN 25 06/23/2021    CR 1.15 10/13/2021    CR 1.05 (H) 06/23/2021    GFRESTIMATED 52 (L) 10/13/2021    GFRESTIMATED 57 (L) 06/23/2021    GFRESTBLACK 67 06/23/2021    VIKTORIYA 9.6 10/13/2021    VIKTORIYA 10.0 06/23/2021    BILITOTAL 1.3 09/16/2021    BILITOTAL 1.2 06/23/2021    ALBUMIN 4.1 09/16/2021    ALBUMIN 4.2 06/23/2021    ALKPHOS 133 09/16/2021    ALKPHOS 136 06/23/2021    ALT 37 09/16/2021    ALT 41 06/23/2021    AST 28 09/16/2021    AST 36 06/23/2021        INR RESULTS:  Lab Results   Component Value Date    INR 3.42 (H) 03/07/2018       Lab Results   Component Value Date    MAG 1.9 04/12/2018     Lab Results   Component Value Date    NTBNPI 485 12/12/2019     Lab Results   Component Value Date    NTBNP 1,274 (H) 05/26/2021       Diagnostics:  Echo 10/9/20:   Interpretation Summary  VSD s/p repair in 1969  Global and regional left ventricular function is normal with an EF of 60-65%.  No flow acceleration is seen across the septum.  Global right ventricular function is normal. Mild right ventricular dilation  is present.  There is moderate tricuspid regurgitation.  The estimated PA systolic pressure is 32 mmHg but this is likely to be  underestimated due to the presence of  multiple jets.  This study was compared with the study from 09/11/2017. There has been no  change in the severity of the tricuspid regurgitation or the RV size/function.     Device check 5/26:  Device: Medtronic [id number] Claudia XT DR GALLEGOS  Normal device function  Mode: AAIR<=>DDDR 60 - 130 bpm  AP: 0.8%  : 2.8%  Intrinsic Rhythm: AFL w/ VS 85 - 125 bpm  Short V-V intervals: none  Lead Trends Appear Stable: yes  Estimated battery longevity to RRT = 13.3 years.   AF Robbins = 90.9%  Anticoagulant: Eliquis  Ventricular Arrhythmia: no new episodes recorded since last remote transmission on 5/18/2021, 1 episode that was recorded as SVT on May 2, 2021 displays what appears to be VT lasting 21 sec @ 200 bpm. Reviewed with Dr. aEton.   Setting Changes: none     Echo 6/16/21:   Interpretation Summary  H/O VSD repair in 1969.  LVEF 50% based on biplane 2D tracing.  Diastolic function not assessed due to atrial fibrillation.  Global right ventricular function is mildly reduced.  Severe biatrial enlargement is present.  Moderate to severe tricuspid insufficiency is present.  Pulmonary artery systolic pressure is normal.  The inferior vena cava is normal.  No pericardial effusion is present.    Echo 8/25/21:   Interpretation Summary  H/O of VSD repair in 1969  The visual ejection fraction is 60-65%. No wall motion abnormalities. Global  right ventricular function is mildly reduced.  Moderate tricuspid insufficiency is present.  There is a right ventricular right atrial pressure difference of 24mmHg.  IVC diameter >2.1 cm collapsing <50% with sniff suggests a high RA pressure  estimated at 15 mmHg or greater.  No pericardial effusion is present.     Assessment and Plan:   Marilu is a 60 year old adult with a past medical history including VSD s/p repair 1969, RA, HTN, Insomnia, Atrial Tachyarrhythmias, cardiac arrest, SSS s/p PPM 12/19, DLBCL, and exudative pericardial effusion. She presents to CORE clinic for routine follow up at  euvolemic state.       HFpEF. Note prior echo with reduced EF with uncontrolled HR likely inaccurate representation of EF. Findings on Echo with controlled HR consistent with HFpEF.   NYHA Class III, AHA/ACC Stage C  Rate control: 85  volume status: Euvolemic  Blood pressure control: Controlled  Aldosterone antagonist: Aldactone 25 mg po daily, will obtain BNP today.   Evaluation of coronary arteries: 6/17 NCA per Angiogram     HTN. Intolerant to Metoprolol in the past.   - Atenolol 50 mg in AM and 25 mg in the evening.   - Lisinopril 2.5 mg po daily      Aflutter. History of SSS s/p PPM 12/19. Long standing history of atrial arrythmias.  - She will send remote transition today.   - Continue Digoxin 125 mcg po daily, will get Dig level next Friday.   - Atenolol as above.   - Continue Eliquis.   - Follow up with EP as recommended.      VSD s/p repair.      History of exudative pericardial effusion.   - Stable per CT 3/19.     Moderate to severe TR. Stable per follow up Echo 8/25/21.   - Continue to monitor symptoms.      Follow up in BNP today, Dig level Friday, and in CORE in 6 months.       ELIZABETH Barrera CNP  10/13/2021          CC        Please do not hesitate to contact me if you have any questions/concerns.     Sincerely,     ELIZABETH Barrera CNP

## 2021-10-14 ENCOUNTER — PATIENT OUTREACH (OUTPATIENT)
Dept: CARDIOLOGY | Facility: CLINIC | Age: 61
End: 2021-10-14

## 2021-10-14 DIAGNOSIS — I50.33 ACUTE ON CHRONIC DIASTOLIC HEART FAILURE (H): ICD-10-CM

## 2021-10-14 DIAGNOSIS — I50.22 CHRONIC SYSTOLIC HEART FAILURE (H): Primary | ICD-10-CM

## 2021-10-14 RX ORDER — SPIRONOLACTONE 25 MG/1
50 TABLET ORAL DAILY
Qty: 180 TABLET | Refills: 3 | Status: SHIPPED | OUTPATIENT
Start: 2021-10-14 | End: 2022-06-13

## 2021-10-14 NOTE — TELEPHONE ENCOUNTER
Called Amelia to review BNP lab and recommendations:  Date: 10/14/2021    Time of Call: 10:04 AM     Diagnosis:  HF     [ VORB ] Ordering provider: Halle Vasquez NP  Order: Increase Spironolactone to 50 mg daily. BMP in one week     Order received by: Pao Alston RN      Follow-up/additional notes: Reviewed with Amelia who verbalized understanding.

## 2021-10-15 ENCOUNTER — OFFICE VISIT (OUTPATIENT)
Dept: FAMILY MEDICINE | Facility: CLINIC | Age: 61
End: 2021-10-15
Payer: COMMERCIAL

## 2021-10-15 VITALS
HEART RATE: 72 BPM | TEMPERATURE: 97.9 F | SYSTOLIC BLOOD PRESSURE: 126 MMHG | BODY MASS INDEX: 26.66 KG/M2 | RESPIRATION RATE: 15 BRPM | WEIGHT: 127 LBS | DIASTOLIC BLOOD PRESSURE: 83 MMHG | HEIGHT: 58 IN | OXYGEN SATURATION: 99 %

## 2021-10-15 DIAGNOSIS — I89.0 LYMPHEDEMA OF BOTH LOWER EXTREMITIES: ICD-10-CM

## 2021-10-15 DIAGNOSIS — D70.9 FEBRILE NEUTROPENIA (H): ICD-10-CM

## 2021-10-15 DIAGNOSIS — I74.9 EMBOLISM AND THROMBOSIS OF UNSPECIFIED ARTERY (H): Chronic | ICD-10-CM

## 2021-10-15 DIAGNOSIS — I48.0 PAROXYSMAL ATRIAL FIBRILLATION (H): Chronic | ICD-10-CM

## 2021-10-15 DIAGNOSIS — M81.8 STEROID-INDUCED OSTEOPOROSIS: ICD-10-CM

## 2021-10-15 DIAGNOSIS — D69.6 THROMBOCYTOPENIA, UNSPECIFIED (H): ICD-10-CM

## 2021-10-15 DIAGNOSIS — N18.30 STAGE 3 CHRONIC KIDNEY DISEASE, UNSPECIFIED WHETHER STAGE 3A OR 3B CKD (H): ICD-10-CM

## 2021-10-15 DIAGNOSIS — E78.5 HYPERLIPIDEMIA LDL GOAL <130: ICD-10-CM

## 2021-10-15 DIAGNOSIS — R50.81 FEBRILE NEUTROPENIA (H): ICD-10-CM

## 2021-10-15 DIAGNOSIS — I50.9 CONGESTIVE HEART FAILURE, UNSPECIFIED HF CHRONICITY, UNSPECIFIED HEART FAILURE TYPE (H): ICD-10-CM

## 2021-10-15 DIAGNOSIS — M05.79 RHEUMATOID ARTHRITIS INVOLVING MULTIPLE SITES WITH POSITIVE RHEUMATOID FACTOR (H): ICD-10-CM

## 2021-10-15 DIAGNOSIS — E44.1 MILD PROTEIN-CALORIE MALNUTRITION (H): ICD-10-CM

## 2021-10-15 DIAGNOSIS — R57.0 CARDIOGENIC SHOCK (H): ICD-10-CM

## 2021-10-15 DIAGNOSIS — I27.0 PRIMARY PULMONARY HYPERTENSION (H): ICD-10-CM

## 2021-10-15 DIAGNOSIS — R73.03 PREDIABETES: ICD-10-CM

## 2021-10-15 DIAGNOSIS — I10 PRIMARY HYPERTENSION: Chronic | ICD-10-CM

## 2021-10-15 DIAGNOSIS — C83.398 DIFFUSE LARGE B-CELL LYMPHOMA OF EXTRANODAL SITE: ICD-10-CM

## 2021-10-15 DIAGNOSIS — T38.0X5A STEROID-INDUCED OSTEOPOROSIS: ICD-10-CM

## 2021-10-15 DIAGNOSIS — F51.01 PRIMARY INSOMNIA: ICD-10-CM

## 2021-10-15 DIAGNOSIS — I47.19 ATRIAL TACHYCARDIA (H): ICD-10-CM

## 2021-10-15 DIAGNOSIS — Z13.1 ENCOUNTER FOR SCREENING EXAMINATION FOR IMPAIRED GLUCOSE REGULATION AND DIABETES MELLITUS: ICD-10-CM

## 2021-10-15 DIAGNOSIS — M79.671 RIGHT FOOT PAIN: ICD-10-CM

## 2021-10-15 DIAGNOSIS — Z12.4 SCREENING FOR MALIGNANT NEOPLASM OF CERVIX: ICD-10-CM

## 2021-10-15 DIAGNOSIS — Z00.00 ROUTINE GENERAL MEDICAL EXAMINATION AT A HEALTH CARE FACILITY: Primary | ICD-10-CM

## 2021-10-15 DIAGNOSIS — I48.92 ATRIAL FLUTTER, UNSPECIFIED TYPE (H): ICD-10-CM

## 2021-10-15 DIAGNOSIS — C83.30 DIFFUSE LARGE B-CELL LYMPHOMA, UNSPECIFIED BODY REGION (H): ICD-10-CM

## 2021-10-15 DIAGNOSIS — H90.6 MIXED CONDUCTIVE AND SENSORINEURAL HEARING LOSS OF BOTH EARS: ICD-10-CM

## 2021-10-15 LAB
CHOLEST SERPL-MCNC: 124 MG/DL
FASTING STATUS PATIENT QL REPORTED: YES
FASTING STATUS PATIENT QL REPORTED: YES
GLUCOSE BLD-MCNC: 115 MG/DL (ref 70–99)
HDLC SERPL-MCNC: 50 MG/DL
LDLC SERPL CALC-MCNC: 55 MG/DL
NONHDLC SERPL-MCNC: 74 MG/DL
TRIGL SERPL-MCNC: 93 MG/DL

## 2021-10-15 PROCEDURE — 80061 LIPID PANEL: CPT | Performed by: PHYSICIAN ASSISTANT

## 2021-10-15 PROCEDURE — 82947 ASSAY GLUCOSE BLOOD QUANT: CPT | Performed by: PHYSICIAN ASSISTANT

## 2021-10-15 PROCEDURE — 99396 PREV VISIT EST AGE 40-64: CPT | Mod: 25 | Performed by: PHYSICIAN ASSISTANT

## 2021-10-15 PROCEDURE — 36415 COLL VENOUS BLD VENIPUNCTURE: CPT | Performed by: PHYSICIAN ASSISTANT

## 2021-10-15 PROCEDURE — 87624 HPV HI-RISK TYP POOLED RSLT: CPT | Performed by: PHYSICIAN ASSISTANT

## 2021-10-15 PROCEDURE — 90715 TDAP VACCINE 7 YRS/> IM: CPT | Performed by: PHYSICIAN ASSISTANT

## 2021-10-15 PROCEDURE — 99212 OFFICE O/P EST SF 10 MIN: CPT | Mod: 25 | Performed by: PHYSICIAN ASSISTANT

## 2021-10-15 PROCEDURE — G0145 SCR C/V CYTO,THINLAYER,RESCR: HCPCS | Performed by: PHYSICIAN ASSISTANT

## 2021-10-15 PROCEDURE — 90471 IMMUNIZATION ADMIN: CPT | Performed by: PHYSICIAN ASSISTANT

## 2021-10-15 PROCEDURE — 90682 RIV4 VACC RECOMBINANT DNA IM: CPT | Performed by: PHYSICIAN ASSISTANT

## 2021-10-15 PROCEDURE — 90472 IMMUNIZATION ADMIN EACH ADD: CPT | Performed by: PHYSICIAN ASSISTANT

## 2021-10-15 RX ORDER — HYDROXYCHLOROQUINE SULFATE 200 MG/1
200 TABLET, FILM COATED ORAL DAILY
Qty: 90 TABLET | Refills: 2 | Status: CANCELLED | OUTPATIENT
Start: 2021-10-15

## 2021-10-15 RX ORDER — PREDNISONE 5 MG/1
5 TABLET ORAL DAILY
Qty: 90 TABLET | Refills: 3 | Status: CANCELLED | OUTPATIENT
Start: 2021-10-15

## 2021-10-15 ASSESSMENT — ENCOUNTER SYMPTOMS
SHORTNESS OF BREATH: 0
ARTHRALGIAS: 1
CONSTIPATION: 0
ABDOMINAL PAIN: 0
DYSURIA: 0
PALPITATIONS: 1
SORE THROAT: 0
BREAST MASS: 0
MYALGIAS: 1
PARESTHESIAS: 0
DIARRHEA: 0
NERVOUS/ANXIOUS: 1
HEARTBURN: 0
FREQUENCY: 1
FEVER: 0
DIZZINESS: 1
HEADACHES: 0
WEAKNESS: 1
HEMATURIA: 0
CHILLS: 0
HEMATOCHEZIA: 0
EYE PAIN: 0
NAUSEA: 0

## 2021-10-15 ASSESSMENT — ANXIETY QUESTIONNAIRES
2. NOT BEING ABLE TO STOP OR CONTROL WORRYING: NOT AT ALL
7. FEELING AFRAID AS IF SOMETHING AWFUL MIGHT HAPPEN: NOT AT ALL
5. BEING SO RESTLESS THAT IT IS HARD TO SIT STILL: NOT AT ALL
IF YOU CHECKED OFF ANY PROBLEMS ON THIS QUESTIONNAIRE, HOW DIFFICULT HAVE THESE PROBLEMS MADE IT FOR YOU TO DO YOUR WORK, TAKE CARE OF THINGS AT HOME, OR GET ALONG WITH OTHER PEOPLE: SOMEWHAT DIFFICULT
6. BECOMING EASILY ANNOYED OR IRRITABLE: SEVERAL DAYS
3. WORRYING TOO MUCH ABOUT DIFFERENT THINGS: NOT AT ALL
1. FEELING NERVOUS, ANXIOUS, OR ON EDGE: SEVERAL DAYS
GAD7 TOTAL SCORE: 3

## 2021-10-15 ASSESSMENT — PATIENT HEALTH QUESTIONNAIRE - PHQ9
5. POOR APPETITE OR OVEREATING: SEVERAL DAYS
SUM OF ALL RESPONSES TO PHQ QUESTIONS 1-9: 7

## 2021-10-15 ASSESSMENT — ACTIVITIES OF DAILY LIVING (ADL)
CURRENT_FUNCTION: MONEY MANAGEMENT REQUIRES ASSISTANCE
CURRENT_FUNCTION: HOUSEWORK REQUIRES ASSISTANCE

## 2021-10-15 ASSESSMENT — MIFFLIN-ST. JEOR: SCORE: 1035.66

## 2021-10-15 NOTE — PROGRESS NOTES
"   SUBJECTIVE:   CC: Amelia Michel is an 60 year old woman who presents for preventive health visit.     Patient has been advised of split billing requirements and indicates understanding: Yes  Healthy Habits:     In general, how would you rate your overall health?  Fair    Frequency of exercise:  1 day/week    Duration of exercise:  15-30 minutes    Do you usually eat at least 4 servings of fruit and vegetables a day, include whole grains    & fiber and avoid regularly eating high fat or \"junk\" foods?  Yes    Taking medications regularly:  Yes    Ability to successfully perform activities of daily living:  Housework requires assistance and money management requires assistance    Home Safety:  No safety concerns identified    Hearing Impairment:  Need to ask people to speak up or repeat themselves and difficulty understanding soft or whispered speech    In the past 6 months, have you been bothered by leaking of urine? Yes    In general, how would you rate your overall mental or emotional health?  Fair      PHQ-2 Total Score: 2    Additional concerns today:  Yes    - seeing endo at the end of the month for prednisone induced osteoporosis  - oncologist is leaving - seeing new oncologist  - rrecent cardiology visit - BNP elevated, increased spironolactone  - rheumatologist: stable. Hands hurt all the time, but no flares  - podiatry: had right ankle fusion. Needs to follow up, not sure if contracture or arthritis that is causing foot to turn in. Sees [facility].    Mood is dependent on how much sleep she gets  Has tried: ativan, ambien, others. Feels  hungover with these meds  Will have a couple weeks she sleeps fine, then a couple weeks she has trouble falling asleep    Today's PHQ-2 Score:   PHQ-2 ( 1999 [employer]) 10/15/2021   Q1: Little interest or pleasure in doing things 1   Q2: Feeling down, depressed or hopeless 1   PHQ-2 Score 2   Q1: Little interest or pleasure in doing things Several days   Q2: Feeling down, " depressed or hopeless Several days   PHQ-2 Score 2       Abuse: Current or Past (Physical, Sexual or Emotional) - No  Do you feel safe in your environment? Yes        Social History     Tobacco Use     Smoking status: Never Smoker     Smokeless tobacco: Never Used   Substance Use Topics     Alcohol use: Yes     Comment: none     If you drink alcohol do you typically have >3 drinks per day or >7 drinks per week? No    Alcohol Use 10/15/2021   Prescreen: >3 drinks/day or >7 drinks/week? No   Prescreen: >3 drinks/day or >7 drinks/week? -   No flowsheet data found.    Reviewed orders with patient.  Reviewed health maintenance and updated orders accordingly - Yes  Lab work is in process  Labs reviewed in EPIC  BP Readings from Last 3 Encounters:   10/15/21 126/83   10/13/21 115/78   09/16/21 107/74    Wt Readings from Last 3 Encounters:   10/15/21 57.6 kg (127 lb)   10/13/21 57.6 kg (127 lb)   09/16/21 57.7 kg (127 lb 4.8 oz)                  Patient Active Problem List   Diagnosis     HTN (hypertension)     Primary pulmonary hypertension (H)     Hyperlipidemia LDL goal <130     RA (rheumatoid arthritis) (H)     Lymphedema of both lower extremities     Atrial tachycardia (H)     Cardiogenic shock (H)     Critical illness myopathy     Diffuse large B-cell lymphoma of extranodal site (H)     Diffuse large B cell lymphoma (H)     Febrile neutropenia (H)     Physical deconditioning     DLBCL (diffuse large B cell lymphoma) (H)     H/O cardiac arrest     Paroxysmal atrial fibrillation (H)     Atrial flutter, unspecified type (H)     Cervical cancer screening     Mild protein-calorie malnutrition (H)     Embolism and thrombosis of unspecified artery (H)     Thrombocytopenia, unspecified (H)     Heart palpitations     Bradycardia     CHF (congestive heart failure) (H)     Chronic kidney disease, stage 3     Past Surgical History:   Procedure Laterality Date     ANESTHESIA CARDIOVERSION N/A 5/17/2017    Procedure: ANESTHESIA  CARDIOVERSION;  ANESTHESIA CARDIOVERSION;  Surgeon: GENERIC ANESTHESIA PROVIDER;  Location: [facility]     ANESTHESIA CARDIOVERSION  3/12/2018    Procedure: ANESTHESIA CARDIOVERSION;;  Surgeon: GENERIC ANESTHESIA PROVIDER;  Location: [facility]     ANESTHESIA CARDIOVERSION N/A 3/23/2018    Procedure: ANESTHESIA CARDIOVERSION;  Anesthesia Cardioversion ;  Surgeon: GENERIC ANESTHESIA PROVIDER;  [facility]: [facility]     ANESTHESIA CARDIOVERSION N/A 4/12/2018    Procedure: ANESTHESIA CARDIOVERSION;  Cardioversion;  Surgeon: GENERIC ANESTHESIA PROVIDER;  Location: [facility]     ARTHRODESIS WRIST  2000    Right wrist     BONE MARROW ASPIRATE &BIOPSY  7/12/2017          BONE MARROW BIOPSY W/ ASPIRATION  07/12/2017     C FUSION FOOT BONES,SUBTALAR Right 10/20/2020    Procedure: RIGHT SUBTALAR JOINT FUSION AND CALCANEOCUBOID FUSION;  Surgeon: Nemesio Crow MD;  Location: [facility];  Service: Orthopedics     CARDIOVERSION      5/17/17, 3/12/18, 3/23/18, 4/14/18,      COLONOSCOPY N/A 11/19/2019    Procedure: Colonoscopy, With Polypectomy And Biopsy;  Surgeon: Pj Vasques MD;  Location: [facility]     EP PACEMAKER N/A 12/13/2019    Procedure: EP Pacemaker;  Surgeon: Pj Trent MD;  Location:  [facility]     EXCISE LESION UPPER EXTREMITY Left 5/22/2018    Procedure: EXCISE LESION UPPER EXTREMITY;  Left Arm Wound Excision And Closure, Possible Submuscular Transposition of Ulnar Nerve  (Choice Anesthesia);  Surgeon: Kevin Sheldon MD;  Location:  OR     FOOT SURGERY      4 left and 2 right     FOOT SURGERY      4 Left and 2 Right      IMPLANT PACEMAKER  12/13/2019    Dr China Trent  [facility] Cardiac Cath lab      IMPLANT PACEMAKER       RELEASE CARPAL TUNNEL Bilateral      RELEASE CARPAL TUNNEL BILATERAL       REPAIR VENTRICULAR SEPTAL DEFECT  1969     TRANSPOSITION ULNAR NERVE (ELBOW) Left 5/22/2018    Procedure: TRANSPOSITION ULNAR NERVE (ELBOW);;  Surgeon: Kevin Sheldon MD;  Location:  OR     ULNAR NERVE  TRANSPOSITION Left 05/22/2018     VSD REPAIR  1969       Social History     Tobacco Use     Smoking status: Never Smoker     Smokeless tobacco: Never Used   Substance Use Topics     Alcohol use: Yes     Comment: none     Family History   Problem Relation Age of Onset     Breast Cancer Mother      Hypertension Mother      Alzheimer Disease Mother      Cerebrovascular Disease Mother      Hypertension Father      Cerebrovascular Disease Father      Atrial fibrillation Father      Lymphoma Father      Prostate Cancer Father      Diabetes Paternal Grandmother      Alzheimer Disease Maternal Grandmother         likely     Arthritis Maternal Grandfather      Pneumonia Maternal Grandfather            Breast Cancer Screening:  Any new diagnosis of family breast, ovarian, or bowel cancer? No    FHS-7: No flowsheet data found.  click delete button to remove this line now  Mammogram Screening: Recommended mammography every 1-2 years with patient discussion and risk factor consideration  Pertinent mammograms are reviewed under the imaging tab.    History of abnormal Pap smear: NO - age 30-65 PAP every 5 years with negative HPV co-testing recommended  PAP / HPV Latest Ref Rng & Units 10/4/2018 1/26/2015 2/8/2011   PAP (Historical) - NIL NIL NIL   HPV16 NEG:Negative Negative - -   HPV18 NEG:Negative Negative - -   HRHPV NEG:Negative Negative - -     Reviewed and updated as needed this visit by clinical staff  Tobacco  Allergies  Meds  Problems  Med Hx  Surg Hx  Fam Hx  Soc Hx          Reviewed and updated as needed this visit by Provider  Tobacco  Allergies  Meds  Problems  Med Hx  Surg Hx  Fam Hx         Past Medical History:   Diagnosis Date     Arthritis      Atrial tachycardia (H)      Cardiac arrest (H)      Cardiac arrest (H)      Critical illness myopathy      Diffuse large B-cell lymphoma (H)      Embolism and thrombosis of unspecified artery (H)      History of blood clots 2017    PICC line Right arm       History of chemotherapy      History of transfusion      Hx antineoplastic chemotherapy     lymphoma     Hypertension      Hypertension      Lymphedema of both lower extremities      Neuropathy      Physical deconditioning      Positive PPD, 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     Thrombocytopenia (H)      VSD (ventricular septal defect)       Past Surgical History:   Procedure Laterality Date     ANESTHESIA CARDIOVERSION N/A 5/17/2017    Procedure: ANESTHESIA CARDIOVERSION;  ANESTHESIA CARDIOVERSION;  Surgeon: GENERIC ANESTHESIA PROVIDER;  [facility]     ANESTHESIA CARDIOVERSION  3/12/2018    Procedure: ANESTHESIA CARDIOVERSION;;  Surgeon: GENERIC ANESTHESIA PROVIDER;  [facility]     ANESTHESIA CARDIOVERSION N/A 3/23/2018    Procedure: ANESTHESIA CARDIOVERSION;  Anesthesia Cardioversion ;  Surgeon: GENERIC ANESTHESIA PROVIDER;  [facility]: [facility]     ANESTHESIA CARDIOVERSION N/A 4/12/2018    Procedure: ANESTHESIA CARDIOVERSION;  Cardioversion;  Surgeon: GENERIC ANESTHESIA PROVIDER;  [facility]     ARTHRODESIS WRIST  2000    Right wrist     BONE MARROW ASPIRATE &BIOPSY  7/12/2017          BONE MARROW BIOPSY W/ ASPIRATION  07/12/2017     C FUSION FOOT BONES,SUBTALAR Right 10/20/2020    Procedure: RIGHT SUBTALAR JOINT FUSION AND CALCANEOCUBOID FUSION;  Surgeon: Nemesio Crow MD;  Location: [facility];  Service: Orthopedics     CARDIOVERSION      5/17/17, 3/12/18, 3/23/18, 4/14/18,      COLONOSCOPY N/A 11/19/2019    Procedure: Colonoscopy, With Polypectomy And Biopsy;  Surgeon: Pj Vasques MD;  Location: [facility]     EP PACEMAKER N/A 12/13/2019    Procedure: EP Pacemaker;  Surgeon: Pj Trent MD;  Location:  [facility]     EXCISE LESION UPPER EXTREMITY Left 5/22/2018    Procedure: EXCISE LESION UPPER EXTREMITY;  Left Arm Wound Excision And Closure, Possible Submuscular Transposition of Ulnar Nerve  (Choice Anesthesia);  Surgeon: Kevin Sheldon MD;  Location:  OR     FOOT SURGERY      4 left  "and 2 right     FOOT SURGERY      4 Left and 2 Right      IMPLANT PACEMAKER  12/13/2019    Dr China Trent  [facility]      IMPLANT PACEMAKER       RELEASE CARPAL TUNNEL Bilateral      RELEASE CARPAL TUNNEL BILATERAL       REPAIR VENTRICULAR SEPTAL DEFECT  1969     TRANSPOSITION ULNAR NERVE (ELBOW) Left 5/22/2018    Procedure: TRANSPOSITION ULNAR NERVE (ELBOW);;  Surgeon: Kevin Sheldon MD;  [facility]: [facility]     ULNAR NERVE TRANSPOSITION Left 05/22/2018     VSD REPAIR  1969       Review of Systems   Constitutional: Negative for chills and fever.   HENT: Positive for congestion. Negative for ear pain, hearing loss and sore throat.    Eyes: Negative for pain and visual disturbance.   Respiratory: Negative for shortness of breath.    Cardiovascular: Positive for palpitations. Negative for chest pain.   Gastrointestinal: Negative for abdominal pain, constipation, diarrhea and nausea.   Genitourinary: Positive for frequency. Negative for dysuria, genital sores, hematuria and urgency.   Musculoskeletal: Positive for arthralgias and myalgias.   Skin: Negative for rash.   Neurological: Positive for dizziness and weakness. Negative for headaches.   Psychiatric/Behavioral: The patient is nervous/anxious.         OBJECTIVE:   /83   Pulse 72   Temp 97.9  F (36.6  C) (Tympanic)   Resp 15   Ht 1.473 m (4' 9.99\")   Wt 57.6 kg (127 lb)   SpO2 99%   BMI 26.55 kg/m    Physical Exam  GENERAL: healthy, alert and no distress  EYES: Eyes grossly normal to inspection, PERRL and conjunctivae and sclerae normal  HENT: ear canals and TM's normal, nose and mouth without ulcers or lesions  NECK: no adenopathy, no asymmetry, masses, or scars and thyroid normal to palpation  RESP: lungs clear to auscultation - no rales, rhonchi or wheezes  CV: regular rate and rhythm, normal S1 S2, no S3 or S4, no murmur, click or rub, no peripheral edema and peripheral pulses strong  ABDOMEN: soft, nontender, no hepatosplenomegaly, no " masses and bowel sounds normal   (female): normal female external genitalia, normal urethral meatus, vaginal mucosa, normal cervix/adnexa/uterus without masses or discharge  MS: no gross musculoskeletal defects noted, no edema  POSITIVE right foot pain, contracture - difficulty keeping right foot level, wears shoes at all times or it will supinate  SKIN: no suspicious lesions or rashes  NEURO: Normal strength and tone, mentation intact and speech normal  BACK: no CVA tenderness, no paralumbar tenderness  PSYCH: mentation appears normal, affect normal/bright  LYMPH: no cervical, supraclavicular, axillary, or inguinal adenopathy    Diagnostic Test Results:  Labs reviewed in Epic    ASSESSMENT/PLAN:     ASSESSMENT/PLAN:      ICD-10-CM    1. Routine general medical examination at a health care facility  Z00.00 INFLUENZA QUAD, RECOMBINANT, P-FREE (RIV4) (FLUBLOK)     REVIEW OF HEALTH MAINTENANCE PROTOCOL ORDERS     TDAP VACCINE (Adacel, Boostrix)  [[id number]]   2. Rheumatoid arthritis involving multiple sites with positive rheumatoid factor (H)  M05.79    3. Steroid-induced osteoporosis  M81.8     T38.0X5A    4. Hyperlipidemia LDL goal <130  E78.5 Lipid panel reflex to direct LDL Fasting     Lipid panel reflex to direct LDL Fasting   5. Screening for malignant neoplasm of cervix  Z12.4 PAP screen with HPV - recommended age 30 - 65 years   6. Primary insomnia  F51.01 doxylamine (UNISOM) 25 MG TABS tablet   7. Encounter for screening examination for impaired glucose regulation and diabetes mellitus  Z13.1 Glucose     Glucose   8. Stage 3 chronic kidney disease, unspecified whether stage 3a or 3b CKD (H)  N18.30    9. Congestive heart failure, unspecified HF chronicity, unspecified heart failure type (H)  I50.9 HEART FAILURE ACTION PLAN   10. Thrombocytopenia, unspecified (H)  D69.6    11. Mild protein-calorie malnutrition (H)  E44.1    12. Atrial flutter, unspecified type (H)  I48.92    13. Diffuse large B-cell lymphoma,  unspecified body region (H)  C83.30    14. Febrile neutropenia (H)  D70.9     R50.81    15. Diffuse large B-cell lymphoma of extranodal site (H)  C83.39    16. Cardiogenic shock (H)  R57.0    17. Atrial tachycardia (H)  I47.1    18. Lymphedema of both lower extremities  I89.0    19. Primary pulmonary hypertension (H)  I27.0    20. Embolism and thrombosis of unspecified artery (H)  I74.9    21. Paroxysmal atrial fibrillation (H)  I48.0    22. Primary hypertension  I10    23. Prediabetes  R73.03    24. Right foot pain  M79.671      Intermittent insomnia: will trial doxylamine, she will try breaking into 1/4s first. Has had hangover sensation with various sleep medications    Patient's chronic conditions are stable.  - RA: follows with rheumatology  - atrial tachycardia/a flutter/fib/CHF/htn: followed by cardiology  - chronic foot pain s/p fusion 2020: recommended appointment with podiatry due to foot pain/contracture  - lymphedema: compression  - B cell lymphoma/thrombocytopenia/neutropenia: monitored by oncology  - prednisone induced osteoporosis: has eval scheduled with endocrine  - prediabetes: on frequent/daily prednisone. Will monitor, future A1C order placed, patient has lab recheck in a couple weeks due to increase in spironolactone.  - hearing loss: patient notes with certain people's voices but not bothersome, declines hearing eval/test today.    Patient has been advised of split billing requirements and indicates understanding: Yes  COUNSELING:  Reviewed preventive health counseling, as reflected in patient instructions       Hearing screening       Immunizations    Vaccinated for: Influenza and TDAP         Osteoporosis prevention/bone health       The ASCVD Risk score (Grayling MARINA Jr., et al., 2013) failed to calculate for the following reasons:    The valid total cholesterol range is 130 to 320 mg/dL    Estimated body mass index is 26.55 kg/m  as calculated from the following:    Height as of this encounter:  "1.473 m (4' 9.99\").    Weight as of this encounter: 57.6 kg (127 lb).    Weight management plan: Discussed healthy diet and exercise guidelines    Amelia Michel reports that Amelia Michel has never smoked. Amelia Michel has never used smokeless tobacco.      Counseling Resources:  ATP IV Guidelines  Pooled Cohorts Equation Calculator  Breast Cancer Risk Calculator  BRCA-Related Cancer Risk Assessment: FHS-7 Tool  FRAX Risk Assessment  ICSI Preventive Guidelines  Dietary Guidelines for Americans, 2010  [employer]'s MyPlate  ASA Prophylaxis  Lung CA Screening    [facility]  Answers for HPI/ROS submitted by the patient on 10/15/2021  Blood in stool: No  heartburn: No  peripheral edema: No  mood changes: Yes  Skin sensation changes: No  pelvic pain: No  vaginal bleeding: No  vaginal discharge: No  tenderness: No  breast mass: No  breast discharge: No        The patient was provided with suggestions to help Amelia Michel develop a healthy physical lifestyle.  Amelia Michel is at risk for lack of exercise and has been provided with information to increase physical activity for the benefit of Amelia Michel's well-being.  The patient reports that Amelia Michel has difficulty with activities of daily living. I have asked that the patient make a follow up appointment in   weeks where this issue will be further evaluated and addressed.  The patient was provided with written information regarding signs of hearing loss.  Information on urinary incontinence and treatment options given to patient.  Information on urinary incontinence and treatment options given to patient.  The patient was provided with suggestions to help Amelia Michel develop a healthy emotional lifestyle.  "

## 2021-10-15 NOTE — LETTER
October 18, 2021      Amelia Michel  [address] [place] [place] [address]        Dear  Marilu,    We are writing to inform you of your test results.    Cholesterol looks great. Fasting glucose is slightly high. I did see you had HbA1C in the prediabetes range in May. We can recheck this at your next lab visit, I placed orders.   Please let me know if you have questions about dietary changes or if you would like to take our prediabetes class.   PAP smear is still processing.   Magda Stringer PA-C/nidhi    Resulted Orders   Lipid panel reflex to direct LDL Fasting   Result Value Ref Range    Cholesterol 124 <200 mg/dL    Triglycerides 93 <150 mg/dL    Direct Measure HDL 50 >=40 mg/dL    LDL Cholesterol Calculated 55 <=100 mg/dL    Non HDL Cholesterol 74 <130 mg/dL    Patient Fasting > 8hrs? Yes     Narrative    The sex of this patient cannot be reliably determined based on discrepancies in demographics (legal sex, sex assigned at birth, gender identity).  Both male and female reference ranges are provided where applicable.  Careful evaluation of the patient s results as compared to the gender specific reference intervals is required in this setting.   Cholesterol  Desirable:  <200 mg/dL    Triglycerides  Normal:  Less than 150 mg/dL  Borderline High:  150-199 mg/dL  High:  200-499 mg/dL  Very High:  Greater than or equal to 500 mg/dL    Direct Measure HDL  Female:  Greater than or equal to 50 mg/dL   Male:  Greater than or equal to 40 mg/dL    LDL Cholesterol  Desirable:  <100mg/dL  Above Desirable:  100-129 mg/dL   Borderline High:  130-159 mg/dL   High:  160-189 mg/dL   Very High:  >= 190 mg/dL    Non HDL Cholesterol  Desirable:  130 mg/dL  Above Desirable:  130-159 mg/dL  Borderline High:  160-189 mg/dL  High:  190-219 mg/dL  Very High:  Greater than or equal to 220 mg/dL   Glucose   Result Value Ref Range    Glucose 115 (H) 70 - 99 mg/dL    Patient Fasting > 8hrs? Yes

## 2021-10-15 NOTE — PATIENT INSTRUCTIONS
Preventive Health Recommendations  Female Ages 50 - 64    Yearly exam: See your health care provider every year in order to  o Review health changes.   o Discuss preventive care.    o Review your medicines if your doctor has prescribed any.      Get a Pap test every three years (unless you have an abnormal result and your provider advises testing more often).    If you get Pap tests with HPV test, you only need to test every 5 years, unless you have an abnormal result.     You do not need a Pap test if your uterus was removed (hysterectomy) and you have not had cancer.    You should be tested each year for STDs (sexually transmitted diseases) if you're at risk.     Have a mammogram every 1 to 2 years.    Have a colonoscopy at age 50, or have a yearly FIT test (stool test). These exams screen for colon cancer.      Have a cholesterol test every 5 years, or more often if advised.    Have a diabetes test (fasting glucose) every three years. If you are at risk for diabetes, you should have this test more often.     If you are at risk for osteoporosis (brittle bone disease), think about having a bone density scan (DEXA).    Shots: Get a flu shot each year. Get a tetanus shot every 10 years.    Nutrition:     Eat at least 5 servings of fruits and vegetables each day.    Eat whole-grain bread, whole-wheat pasta and brown rice instead of white grains and rice.    Get adequate Calcium and Vitamin D.     Lifestyle    Exercise at least 150 minutes a week (30 minutes a day, 5 days a week). This will help you control your weight and prevent disease.    Limit alcohol to one drink per day.    No smoking.     Wear sunscreen to prevent skin cancer.     See your dentist every six months for an exam and cleaning.    See your eye doctor every 1 to 2 years.    Patient Education   Personalized Prevention Plan  You are due for the preventive services outlined below.  Your care team is available to assist you in scheduling these  services.  If you have already completed any of these items, please share that information with your care team to update in your medical record.  Health Maintenance Due   Topic Date Due     Heart Failure Action Plan  Never done     ANNUAL REVIEW OF HM ORDERS  Never done     Pneumococcal Vaccine (3 of 4 - PPSV23) 01/08/2020     Eye Exam  10/31/2020     Digoxin Lab  12/13/2020     Diptheria Tetanus Pertussis (DTAP/TDAP/TD) Vaccine (2 - Td or Tdap) 02/08/2021     COVID-19 Vaccine (3 - Pfizer risk 3-dose series) 05/13/2021     Flu Vaccine (1) 09/01/2021     Cholesterol Lab  10/14/2021     HPV Screening  10/04/2021     PAP Smear  10/04/2021     Your Health Risk Assessment indicates you feel you are not in good health    A healthy lifestyle helps keep the body fit and the mind alert. It helps protect you from disease, helps you fight disease, and helps prevent chronic disease (disease that doesn't go away) from getting worse. This is important as you get older and begin to notice twinges in muscles and joints and a decline in the strength and stamina you once took for granted. A healthy lifestyle includes good healthcare, good nutrition, weight control, recreation, and regular exercise. Avoid harmful substances and do what you can to keep safe. Another part of a healthy lifestyle is stay mentally active and socially involved.    Good healthcare     Have a wellness visit every year.     If you have new symptoms, let us know right away. Don't wait until the next checkup.     Take medicines exactly as prescribed and keep your medicines in a safe place. Tell us if your medicine causes problems.   Healthy diet and weight control     Eat 3 or 4 small, nutritious, low-fat, high-fiber meals a day. Include a variety of fruits, vegetables, and whole-grain foods.     Make sure you get enough calcium in your diet. Calcium, vitamin D, and exercise help prevent osteoporosis (bone thinning).     If you live alone, try eating with others  when you can. That way you get a good meal and have company while you eat it.     Try to keep a healthy weight. If you eat more calories than your body uses for energy, it will be stored as fat and you will gain weight.     Recreation   Recreation is not limited to sports and team events. It includes any activity that provides relaxation, interest, enjoyment, and exercise. Recreation provides an outlet for physical, mental, and social energy. It can give a sense of worth and achievement. It can help you stay healthy.    Mental Exercise and Social Involvement  Mental and emotional health is as important as physical health. Keep in touch with friends and family. Stay as active as possible. Continue to learn and challenge yourself.   Things you can do to stay mentally active are:    Learn something new, like a foreign language or musical instrument.     Play SCRABBLE or do crossword puzzles. If you cannot find people to play these games with you at home, you can play them with others on your computer through the Internet.     Join a games club--anything from card games to chess or checkers or lawn bowling.     Start a new hobby.     Go back to school.     Volunteer.     Read.   Keep up with world events.    Exercise for a Healthier Heart  You may wonder how you can improve the health of your heart. If you re thinking about exercise, you re on the right track. You don t need to become an athlete. But you do need a certain amount of brisk exercise to help strengthen your heart. If you have been diagnosed with a heart condition, your healthcare provider may advise exercise to help stabilize your condition. To help make exercise a habit, choose safe, fun activities.      Exercise with a friend. When activity is fun, you're more likely to stick with it.   Before you start  Check with your healthcare provider before starting an exercise program. This is especially important if you have not been active for a while. It's also  important if you have a long-term (chronic) health problem such as heart disease, diabetes, or obesity. Or if you are at high risk for having these problems.   Why exercise?  Exercising regularly offers many healthy rewards. It can help you do all of the following:     Improve your blood cholesterol level to help prevent further heart trouble    Lower your blood pressure to help prevent a stroke or heart attack    Control diabetes, or reduce your risk of getting this disease    Improve your heart and lung function    Reach and stay at a healthy weight    Make your muscles stronger so you can stay active    Prevent falls and fractures by slowing the loss of bone mass (osteoporosis)    Manage stress better    Reduce your blood pressure    Improve your sense of self and your body image  Exercise tips      Ease into your routine. Set small goals. Then build on them. If you are not sure what your activity level should be, talk with your healthcare provider first before starting an exercise routine.    Exercise on most days. Aim for a total of 150 minutes (2 hours and 30 minutes) or more of moderate-intensity aerobic activity each week. Or 75 minutes (1 hour and 15 minutes) or more of vigorous-intensity aerobic activity each week. Or try for a combination of both. Moderate activity means that you breathe heavier and your heart rate increases but you can still talk. Think about doing 40 minutes of moderate exercise, 3 to 4 times a week. For best results, activity should last for about 40 minutes to lower blood pressure and cholesterol. It's OK to work up to the 40-minute period over time. Examples of moderate-intensity activity are walking 1 mile in 15 minutes. Or doing 30 to 45 minutes of yard work.    Step up your daily activity level.  Along with your exercise program, try being more active the whole day. Walk instead of drive. Or park further away so that you take more steps each day. Do more household tasks or yard  work. You may not be able to meet the advised mount of physical activity. But doing some moderate- or vigorous-intensity aerobic activity can help reduce your risk for heart disease. Your healthcare provider can help you figure out what is best for you.    Choose 1 or more activities you enjoy.  Walking is one of the easiest things you can do. You can also try swimming, riding a bike, dancing, or taking an exercise class.    When to call your healthcare provider  Call your healthcare provider if you have any of these:     Chest pain or feel dizzy or lightheaded    Burning, tightness, pressure, or heaviness in your chest, neck, shoulders, back, or arms    Abnormal shortness of breath    More joint or muscle pain    A very fast or irregular heartbeat (palpitations)  [employer] last reviewed this educational content on 7/1/2019 2000-2021 [employer]. All rights reserved. This information is not intended as a substitute for professional medical care. Always follow your healthcare professional's instructions.        Activities of Daily Living    Your Health Risk Assessment indicates you have difficulties with activities of daily living such as housework, bathing, preparing meals, taking medication, etc. Please make a follow up appointment for us to address this issue in more detail.    Signs of Hearing Loss      Hearing much better with one ear can be a sign of hearing loss.   Hearing loss is a problem shared by many people. In fact, it is one of the most common health problems, particularly as people age. Most people age 65 and older have some hearing loss. By age 80, almost everyone does. Hearing loss often occurs slowly over the years. So you may not realize your hearing has gotten worse.  Have your hearing checked  Call your healthcare provider if you:    Have to strain to hear normal conversation    Have to watch other people s faces very carefully to follow what they re saying    Need to ask people  to repeat what they ve said    Often misunderstand what people are saying    Turn the volume of the television or radio up so high that others complain    Feel that people are mumbling when they re talking to you    Find that the effort to hear leaves you feeling tired and irritated    Notice, when using the phone, that you hear better with one ear than the other  [employer] last reviewed this educational content on 1/1/2020 2000-2021 [employer]. All rights reserved. This information is not intended as a substitute for professional medical care. Always follow your healthcare professional's instructions.          Urinary Incontinence (Male)    Urinary incontinence means not being able to control the release of urine from the bladder.   Causes  Common causes of urinary incontinence in men include:    Infection    Certain medicines    Aging    Poor pelvic muscle tone    Bladder spasms    Obesity    Trouble urinating and fully emptying the bladder (urinary retention)  Other things that can cause incontinence are:     Nervous system diseases    Diabetes    Sleep apnea    Urinary tract infections    Prostate surgery    Pelvic injury  Constipation and smoking have also been identified as risk factors.   Symptoms    Urge incontinence (overactive bladder). This is a sudden urge to urinate. It occurs even though there may not be much urine in the bladder. The need to urinate often during the night is common. It's due to bladder spasms.    Stress incontinence. This is urine leakage that you can't control. It can occur with sneezing, coughing, and other actions that put stress on the bladder.    Treatment  Treatment depends on what is causing the condition. Bladder infections are treated with antibiotics. Urinary retention is treated with a bladder catheter.   Home care  Follow these guidelines when caring for yourself at home:    Don't have any foods and drinks that may irritate the bladder. This  includes:  ? Chocolate  ? Alcohol  ? Caffeine  ? Carbonated drinks  ? Acidic fruits and juices    Limit fluids to 6 to 8 cups a day.    Lose weight if you are overweight. This will reduce your symptoms.    If advised, do regular pelvic muscle-strengthening exercises such as Kegel exercises.    If needed, wear absorbent pads to catch urine. Change the pads often. This is for good hygiene and to prevent skin and bladder infections.    Bathe daily for good hygiene.    If an antibiotic was prescribed to treat a bladder infection, take it until it's finished. Keep taking it even if you are feeling better. This is to make sure your infection has cleared.    If a catheter was left in place, keep bacteria from getting into the collection bag. Don't disconnect the catheter from the collection bag.    Use a leg band to secure the catheter drainage tube, so it does not pull on the catheter. Drain the collection bag when it becomes full. To do this, use the drain spout at the bottom of the bag. Don't disconnect the bag from the catheter.    Don't pull on or try to remove a catheter. The catheter must be removed by a healthcare provider.    If you smoke, stop. Ask your provider for help if you can't do this on your own.  Follow-up care  Follow up with your healthcare provider, or as advised.  When to get medical advice  Call your healthcare provider right away if any of these occur:    Fever over 100.4 F (38 C), or as directed by your provider    Bladder pain or fullness    Belly swelling, nausea, or vomiting    Back pain    Weakness, dizziness, or fainting    If a catheter was left in place, return if:  ? The catheter falls out  ? The catheter stops draining for 6 hours  ? Your urine gets cloudy or smells bad  [employer] last reviewed this educational content on 1/1/2020 2000-2021 [employer]. All rights reserved. This information is not intended as a substitute for professional medical care. Always follow your  healthcare professional's instructions.          Urinary Incontinence, Female (Adult)   Urinary incontinence means loss of bladder control. This problem affects many women, especially as they get older. If you have incontinence, you may be embarrassed to ask for help. But know that this problem can be treated.   Types of Incontinence  There are different types of incontinence. Two of the main types are described here. You can have more than one type.     Stress incontinence. With this type, urine leaks when pressure (stress) is put on the bladder. This may happen when you cough, sneeze, or laugh. Stress incontinence most often occurs because the pelvic floor muscles that support the bladder and urethra are weak. This can happen after pregnancy and vaginal childbirth or a hysterectomy. It can also be due to excess body weight or hormone changes.    Urge incontinence (also called overactive bladder). With this type, a sudden urge to urinate is felt often. This may happen even though there may not be much urine in the bladder. The need to urinate often during the night is common. Urge incontinence most often occurs because of bladder spasms. This may be due to bladder irritation or infection. Damage to bladder nerves or pelvic muscles, constipation, and certain medicines can also lead to urge incontinence.  Treatment depends on the cause. Further evaluation is needed to find the type you have. This will likely include an exam and certain tests. Based on the results, you and your healthcare provider can then plan treatment. Until a diagnosis is made, the home care tips below can help ease symptoms.   Home care    Do pelvic floor muscle exercises, if they are prescribed. The pelvic floor muscles help support the bladder and urethra. Many women find that their symptoms improve when doing special exercises that strengthen these muscles. To do the exercises, contract the muscles you would use to stop your stream of urine.  But do this when you re not urinating. Hold for 10 seconds, then relax. Repeat 10 to 20 times in a row, at least 3 times a day. Your healthcare provider may give you other instructions for how to do the exercises and how often.    Keep a bladder diary. This helps track how often and how much you urinate over a set period of time. Bring this diary with you to your next visit with the provider. The information can help your provider learn more about your bladder problem.    Lose weight, if advised to by your provider. Extra weight puts pressure on the bladder. Your provider can help you create a weight-loss plan that s right for you. This may include exercising more and making certain diet changes.    Don't have foods and drinks that may irritate the bladder. These can include alcohol and caffeinated drinks.    Quit smoking. Smoking and other tobacco use can lead to a long-term (chronic) cough that strains the pelvic floor muscles. Smoking may also damage the bladder and urethra. Talk with your provider about treatments or methods you can use to quit smoking.    If drinking large amounts of fluid makes you have symptoms, you may be advised to limit your fluid intake. You may also be advised to drink most of your fluids during the day and to limit fluids at night.    If you re worried about urine leakage or accidents, you may wear absorbent pads to catch urine. Change the pads often. This helps reduce discomfort. It may also reduce the risk of skin or bladder infections.    Follow-up care  Follow up with your healthcare provider, or as directed. It may take some to find the right treatment for your problem. But healthy lifestyle changes can be made right away. These include such things as exercising on a regular basis, eating a healthy diet, losing weight (if needed), and quitting smoking. Your treatment plan may include special therapies or medicines. Certain procedures or surgery may also be options. Talk about any  questions you have with your provider.   When to seek medical advice  Call the healthcare provider right away if any of these occur:    Fever of 100.4 F (38 C) or higher, or as directed by your provider    Bladder pain or fullness    Belly swelling    Nausea or vomiting    Back pain    Weakness, dizziness, or fainting  [employer] last reviewed this educational content on 1/1/2020 2000-2021 [employer]. All rights reserved. This information is not intended as a substitute for professional medical care. Always follow your healthcare professional's instructions.        Your Health Risk Assessment indicates you feel you are not in good emotional health.    Recreation   Recreation is not limited to sports and team events. It includes any activity that provides relaxation, interest, enjoyment, and exercise. Recreation provides an outlet for physical, mental, and social energy. It can give a sense of worth and achievement. It can help you stay healthy.    Mental Exercise and Social Involvement  Mental and emotional health is as important as physical health. Keep in touch with friends and family. Stay as active as possible. Continue to learn and challenge yourself.   Things you can do to stay mentally active are:    Learn something new, like a foreign language or musical instrument.     Play SCRABBLE or do crossword puzzles. If you cannot find people to play these games with you at home, you can play them with others on your computer through the Internet.     Join a games club--anything from card games to chess or checkers or lawn bowling.     Start a new hobby.     Go back to school.     Volunteer.     Read.   Keep up with world events.

## 2021-10-16 PROBLEM — N18.30 CHRONIC KIDNEY DISEASE, STAGE 3 (H): Status: ACTIVE | Noted: 2021-10-16

## 2021-10-16 ASSESSMENT — ANXIETY QUESTIONNAIRES: GAD7 TOTAL SCORE: 3

## 2021-10-19 LAB
BKR LAB AP GYN ADEQUACY: NORMAL
BKR LAB AP GYN INTERPRETATION: NORMAL
BKR LAB AP HPV REFLEX: NORMAL
BKR LAB AP PREVIOUS ABNORMAL: NORMAL
PATH REPORT.COMMENTS IMP SPEC: NORMAL
PATH REPORT.RELEVANT HX SPEC: NORMAL

## 2021-10-21 ENCOUNTER — LAB (OUTPATIENT)
Dept: LAB | Facility: CLINIC | Age: 61
End: 2021-10-21
Payer: COMMERCIAL

## 2021-10-21 DIAGNOSIS — R73.03 PREDIABETES: ICD-10-CM

## 2021-10-21 DIAGNOSIS — I47.19 ATRIAL TACHYCARDIA (H): ICD-10-CM

## 2021-10-21 DIAGNOSIS — I50.22 CHRONIC SYSTOLIC HEART FAILURE (H): ICD-10-CM

## 2021-10-21 LAB
ANION GAP SERPL CALCULATED.3IONS-SCNC: 8 MMOL/L (ref 3–14)
BUN SERPL-MCNC: 31 MG/DL (ref 7–30)
CALCIUM SERPL-MCNC: 10.2 MG/DL (ref 8.5–10.1)
CHLORIDE BLD-SCNC: 98 MMOL/L (ref 94–109)
CO2 SERPL-SCNC: 27 MMOL/L (ref 20–32)
CREAT SERPL-MCNC: 1.1 MG/DL (ref 0.52–1.25)
DIGOXIN SERPL-MCNC: 0.9 UG/L
GFR SERPL CREATININE-BSD FRML MDRD: 55 ML/MIN/1.73M2
GLUCOSE BLD-MCNC: 92 MG/DL (ref 70–99)
HBA1C MFR BLD: 5.7 % (ref 0–5.6)
HUMAN PAPILLOMA VIRUS 16 DNA: NEGATIVE
HUMAN PAPILLOMA VIRUS 18 DNA: NEGATIVE
HUMAN PAPILLOMA VIRUS FINAL DIAGNOSIS: NORMAL
HUMAN PAPILLOMA VIRUS OTHER HR: NEGATIVE
POTASSIUM BLD-SCNC: 4.1 MMOL/L (ref 3.4–5.3)
SODIUM SERPL-SCNC: 133 MMOL/L (ref 133–144)

## 2021-10-21 PROCEDURE — 83036 HEMOGLOBIN GLYCOSYLATED A1C: CPT

## 2021-10-21 PROCEDURE — 36415 COLL VENOUS BLD VENIPUNCTURE: CPT

## 2021-10-21 PROCEDURE — 80048 BASIC METABOLIC PNL TOTAL CA: CPT

## 2021-10-21 PROCEDURE — 80162 ASSAY OF DIGOXIN TOTAL: CPT

## 2021-10-28 ENCOUNTER — OFFICE VISIT (OUTPATIENT)
Dept: ENDOCRINOLOGY | Facility: CLINIC | Age: 61
End: 2021-10-28
Attending: INTERNAL MEDICINE
Payer: COMMERCIAL

## 2021-10-28 VITALS
DIASTOLIC BLOOD PRESSURE: 70 MMHG | BODY MASS INDEX: 26.86 KG/M2 | SYSTOLIC BLOOD PRESSURE: 103 MMHG | WEIGHT: 124.5 LBS | HEART RATE: 98 BPM | HEIGHT: 57 IN

## 2021-10-28 DIAGNOSIS — D75.89 MACROCYTOSIS WITHOUT ANEMIA: Primary | ICD-10-CM

## 2021-10-28 DIAGNOSIS — E04.2 NONTOXIC MULTINODULAR GOITER: ICD-10-CM

## 2021-10-28 DIAGNOSIS — M06.9 RHEUMATOID ARTHRITIS OF BOTH ANKLES, UNSPECIFIED WHETHER RHEUMATOID FACTOR PRESENT (H): ICD-10-CM

## 2021-10-28 DIAGNOSIS — E24.2 IATROGENIC CUSHINGOID FEATURES (H): ICD-10-CM

## 2021-10-28 DIAGNOSIS — Z79.52 ON PREDNISONE THERAPY: ICD-10-CM

## 2021-10-28 DIAGNOSIS — R73.01 IMPAIRED FASTING GLUCOSE: ICD-10-CM

## 2021-10-28 DIAGNOSIS — E83.52 HYPERCALCEMIA: ICD-10-CM

## 2021-10-28 DIAGNOSIS — C83.30 DIFFUSE LARGE B-CELL LYMPHOMA, UNSPECIFIED BODY REGION (H): ICD-10-CM

## 2021-10-28 DIAGNOSIS — M81.8 STEROID-INDUCED OSTEOPOROSIS: ICD-10-CM

## 2021-10-28 DIAGNOSIS — T38.0X5A STEROID-INDUCED OSTEOPOROSIS: ICD-10-CM

## 2021-10-28 DIAGNOSIS — N18.31 STAGE 3A CHRONIC KIDNEY DISEASE (H): ICD-10-CM

## 2021-10-28 PROCEDURE — 99205 OFFICE O/P NEW HI 60 MIN: CPT | Performed by: INTERNAL MEDICINE

## 2021-10-28 ASSESSMENT — ENCOUNTER SYMPTOMS
SWOLLEN GLANDS: 0
FATIGUE: 1
MUSCLE WEAKNESS: 1
MEMORY LOSS: 1
DISTURBANCES IN COORDINATION: 1
SEIZURES: 0
PALPITATIONS: 1
HYPOTENSION: 0
HYPERTENSION: 1
HEADACHES: 0
DIZZINESS: 1
SLEEP DISTURBANCES DUE TO BREATHING: 0
LIGHT-HEADEDNESS: 0
LEG PAIN: 0
BACK PAIN: 1
WEAKNESS: 1
INSOMNIA: 1
PANIC: 0
DOUBLE VISION: 0
ARTHRALGIAS: 1
ORTHOPNEA: 0
PARALYSIS: 0
FLANK PAIN: 0
CHILLS: 1
SPEECH CHANGE: 1
EYE REDNESS: 0
TREMORS: 0
MYALGIAS: 1
TINGLING: 1
HEMATURIA: 0
JOINT SWELLING: 1
POLYPHAGIA: 0
DYSURIA: 0
EXERCISE INTOLERANCE: 1
POLYDIPSIA: 0
FEVER: 0
NIGHT SWEATS: 0
DEPRESSION: 0
DIFFICULTY URINATING: 0
DECREASED CONCENTRATION: 1
EYE PAIN: 0
BRUISES/BLEEDS EASILY: 1
EYE IRRITATION: 1
EYE WATERING: 0
NERVOUS/ANXIOUS: 0
WEIGHT GAIN: 0
STIFFNESS: 1
NUMBNESS: 1
NECK PAIN: 0
ALTERED TEMPERATURE REGULATION: 1
LOSS OF CONSCIOUSNESS: 0
WEIGHT LOSS: 0
SYNCOPE: 0
DECREASED APPETITE: 0
MUSCLE CRAMPS: 1
INCREASED ENERGY: 1
HALLUCINATIONS: 0

## 2021-10-28 ASSESSMENT — MIFFLIN-ST. JEOR: SCORE: 1016.23

## 2021-10-28 NOTE — LETTER
10/28/2021       RE: Amelia Michel  [address]  [facility] [address]     Dear Colleague,    Thank you for referring your patient, Amelia Michel, to the [facility] [place] at [facility]. Please see a copy of my visit note below.    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    # Osteoporosis    No prior fractures. She has poor balance but no recent falls. No prior nephrolithiasis.    She has rheumatoid arthritis and has been on prednisone daily since her mid 30s, initially 3 mg daily and since 2017 she has been on prednisone 5 mg daily. She had been on methotrexate previously but it was stopped remotely. Outside of chemotherapy for diffuse large B cell lymphoma in 2017 (she has never had radiation therapy, her DLBCL was treated only with chemotherapy in 2017), no high dose glucocorticoid exposure. No other GC exposure outside of prednisone. She has been on apixaban since 2017.  We reviewed the standard osteoporosis dictation template and did not identify other risk factors for fracture.    10/21/2021: GFR 55 (~ baseline), uncorrected serum calcium 10.2 (ULN 10.1 mg/dL) without an albumin, many calcium values over the years since 2005 and all normal except 1 other in 2017 with a calcium of 10.2 mg/dL     Intermittent hypophosphatemia at times. Alkaline phosphatase normal 9/2021. Hgb normal with macrocytosis 9/2021.     DEXA 3/30/2021:  -lumbar spine non diagnostic  -lowest T-score -2.0 at the right femoral neck with an increase in the total mean hip BMD by 6.1% (significant) since 2018     Complex cardiac history, see cardiology note 10/13/2021, reportedly with normal coronary arteries. CT imaging has shown atherosclerosis in the abdominal aorta. No prior ASCVD event.     She believes she was on weekly Fosamax from ~ 2005 until 2017. However, this is in contrast to  her notes in the medical record that state Fosamax for 6-7 years in the early 2000s. She then restarted Fosamax 70 mg once weekly in 1/2019 and took it until 4/2021. She may have been off of Fosamax for a few months in the spring of 2020. Fosamax has been well tolerated. No other prior pharmacologic therapy to reduce fracture risk.    She takes calcium 1200 mg daily. She has 2 servings of dairy per day. She takes vitamin D daily but does not know the dose.     No recent or upcoming dental extraction. No GERD.     # Thyroid nodularity    CT chest 2019: multinodular thyroid gland with extension into the mediastinum, stable compared with 2015    No radiation therapy. No FH of thyroid pathology. No FH of thyroid cancer. No compressive symptoms. No prior thyroid biopsy. No prior use of thyroid hormone. No history of abnormal thyroid function testing.    # Dysglycemia    Has had HbA1c in the prediabetic range for a few years. Fasting glucose 126 mg/dL 10/13/2021.  mg/dL 10/15/2021. She has not previously been on medication to lower glucose.     Objective:    Appears well. Mildly cushingoid. Thyroid about 20 grams and nodular. No cervical LAD. Dentition in good repair. Mild kyphosis. Spine non tender.    Assessment/Plan:    # Osteoporosis, glucocorticoid associated     Amelia has a significantly elevated fracture risk given her numerous risk factors including rheumatoid arthritis with longstanding supraphysiologic prednisone use.  She is currently on prednisone 5 mg daily, but given her small stature this is a supraphysiologic dose.  She is mildly cushingoid on exam and I encouraged her to discuss with her rheumatologist if she could potentially get by with a lower dose or other nonsteroid therapies.  She has been off Fosamax since around April 2021.  The exact duration of her Fosamax therapy over the years is unclear, as she believes what is documented in the chart re duration is incorrect.  We will start with  bone turnover markers to see the degree of Fosamax efficacy.  We will also look for additional etiologies of increased fracture risk including PTH with minerals (given mildly elevated calcium and intermittent hypophosphatemia over the years), vitamin D, creatinine, serum and urine monoclonal gammopathy screen.  I also ordered a 24-hour urine collection.  When she returns to see me we will review treatment options and discussed optimization of calcium and vitamin D.  I do not think her mildly elevated calcium of 10.2 mg/dL is indicative of any underlying parathyroid abnormality given her many normal serum calcium values over the years, but this needs further investigation.     # Thyroid nodularity    I ordered a thyroid ultrasound and TSH.    # Dysglycemia    Suspect her hemoglobin A1c is not reliable.  With upcoming lab draw we will get a fasting glucose.    65 minutes spent on the date of the encounter doing chart review, history and exam, documentation and further activities as noted above.     Again, thank you for allowing me to participate in the care of your patient.      Sincerely,    Dima Shrestha MD

## 2021-10-28 NOTE — PROGRESS NOTES
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    # Osteoporosis    No prior fractures. She has poor balance but no recent falls. No prior nephrolithiasis.    She has rheumatoid arthritis and has been on prednisone daily since her mid 30s, initially 3 mg daily and since 2017 she has been on prednisone 5 mg daily. She had been on methotrexate previously but it was stopped remotely. Outside of chemotherapy for diffuse large B cell lymphoma in 2017 (she has never had radiation therapy, her DLBCL was treated only with chemotherapy in 2017), no high dose glucocorticoid exposure. No other GC exposure outside of prednisone. She has been on apixaban since 2017.  We reviewed the standard osteoporosis dictation template and did not identify other risk factors for fracture.    10/21/2021: GFR 55 (~ baseline), uncorrected serum calcium 10.2 (ULN 10.1 mg/dL) without an albumin, many calcium values over the years since 2005 and all normal except 1 other in 2017 with a calcium of 10.2 mg/dL     Intermittent hypophosphatemia at times. Alkaline phosphatase normal 9/2021. Hgb normal with macrocytosis 9/2021.     DEXA 3/30/2021:  -lumbar spine non diagnostic  -lowest T-score -2.0 at the right femoral neck with an increase in the total mean hip BMD by 6.1% (significant) since 2018     Complex cardiac history, see cardiology note 10/13/2021, reportedly with normal coronary arteries. CT imaging has shown atherosclerosis in the abdominal aorta. No prior ASCVD event.     She believes she was on weekly Fosamax from ~ 2005 until 2017. However, this is in contrast to her notes in the medical record that state Fosamax for 6-7 years in the early 2000s. She then restarted Fosamax 70 mg once weekly in 1/2019 and took it until 4/2021. She may have been off of Fosamax for a few months in the spring of 2020. Fosamax has been well tolerated. No other prior pharmacologic therapy to reduce  fracture risk.    She takes calcium 1200 mg daily. She has 2 servings of dairy per day. She takes vitamin D daily but does not know the dose.     No recent or upcoming dental extraction. No GERD.     # Thyroid nodularity    CT chest 2019: multinodular thyroid gland with extension into the mediastinum, stable compared with 2015    No radiation therapy. No FH of thyroid pathology. No FH of thyroid cancer. No compressive symptoms. No prior thyroid biopsy. No prior use of thyroid hormone. No history of abnormal thyroid function testing.    # Dysglycemia    Has had HbA1c in the prediabetic range for a few years. Fasting glucose 126 mg/dL 10/13/2021.  mg/dL 10/15/2021. She has not previously been on medication to lower glucose.     Objective:    Appears well. Mildly cushingoid. Thyroid about 20 grams and nodular. No cervical LAD. Dentition in good repair. Mild kyphosis. Spine non tender.    Assessment/Plan:    # Osteoporosis, glucocorticoid associated     Amelia has a significantly elevated fracture risk given her numerous risk factors including rheumatoid arthritis with longstanding supraphysiologic prednisone use.  She is currently on prednisone 5 mg daily, but given her small stature this is a supraphysiologic dose.  She is mildly cushingoid on exam and I encouraged her to discuss with her rheumatologist if she could potentially get by with a lower dose or other nonsteroid therapies.  She has been off Fosamax since around April 2021.  The exact duration of her Fosamax therapy over the years is unclear, as she believes what is documented in the chart re duration is incorrect.  We will start with bone turnover markers to see the degree of Fosamax efficacy.  We will also look for additional etiologies of increased fracture risk including PTH with minerals (given mildly elevated calcium and intermittent hypophosphatemia over the years), vitamin D, creatinine, serum and urine monoclonal gammopathy screen.  I also  ordered a 24-hour urine collection.  When she returns to see me we will review treatment options and discussed optimization of calcium and vitamin D.  I do not think her mildly elevated calcium of 10.2 mg/dL is indicative of any underlying parathyroid abnormality given her many normal serum calcium values over the years, but this needs further investigation.     # Thyroid nodularity    I ordered a thyroid ultrasound and TSH.    # Dysglycemia    Suspect her hemoglobin A1c is not reliable.  With upcoming lab draw we will get a fasting glucose.    65 minutes spent on the date of the encounter doing chart review, history and exam, documentation and further activities as noted above.

## 2021-11-01 ENCOUNTER — IMMUNIZATION (OUTPATIENT)
Dept: FAMILY MEDICINE | Facility: CLINIC | Age: 61
End: 2021-11-01
Payer: COMMERCIAL

## 2021-11-01 PROCEDURE — 0004A PR COVID VAC PFIZER DIL RECON 30 MCG/0.3 ML IM: CPT

## 2021-11-01 PROCEDURE — 91300 PR COVID VAC PFIZER DIL RECON 30 MCG/0.3 ML IM: CPT

## 2021-11-02 ENCOUNTER — ANCILLARY PROCEDURE (OUTPATIENT)
Dept: ULTRASOUND IMAGING | Facility: CLINIC | Age: 61
End: 2021-11-02
Attending: INTERNAL MEDICINE
Payer: COMMERCIAL

## 2021-11-02 ENCOUNTER — ANCILLARY PROCEDURE (OUTPATIENT)
Dept: GENERAL RADIOLOGY | Facility: CLINIC | Age: 61
End: 2021-11-02
Attending: INTERNAL MEDICINE
Payer: COMMERCIAL

## 2021-11-02 ENCOUNTER — LAB (OUTPATIENT)
Dept: LAB | Facility: CLINIC | Age: 61
End: 2021-11-02
Attending: INTERNAL MEDICINE
Payer: COMMERCIAL

## 2021-11-02 DIAGNOSIS — D75.89 MACROCYTOSIS WITHOUT ANEMIA: ICD-10-CM

## 2021-11-02 DIAGNOSIS — M81.8 STEROID-INDUCED OSTEOPOROSIS: ICD-10-CM

## 2021-11-02 DIAGNOSIS — N18.31 STAGE 3A CHRONIC KIDNEY DISEASE (H): ICD-10-CM

## 2021-11-02 DIAGNOSIS — E04.2 NONTOXIC MULTINODULAR GOITER: ICD-10-CM

## 2021-11-02 DIAGNOSIS — C83.30 DIFFUSE LARGE B-CELL LYMPHOMA, UNSPECIFIED BODY REGION (H): ICD-10-CM

## 2021-11-02 DIAGNOSIS — T38.0X5A STEROID-INDUCED OSTEOPOROSIS: ICD-10-CM

## 2021-11-02 DIAGNOSIS — Z79.52 ON PREDNISONE THERAPY: ICD-10-CM

## 2021-11-02 DIAGNOSIS — M06.9 RHEUMATOID ARTHRITIS OF BOTH ANKLES, UNSPECIFIED WHETHER RHEUMATOID FACTOR PRESENT (H): ICD-10-CM

## 2021-11-02 DIAGNOSIS — E24.2 IATROGENIC CUSHINGOID FEATURES (H): ICD-10-CM

## 2021-11-02 LAB
ALBUMIN SERPL-MCNC: 4.1 G/DL (ref 3.4–5)
CALCIUM SERPL-MCNC: 10.2 MG/DL (ref 8.5–10.1)
CREAT SERPL-MCNC: 1.15 MG/DL (ref 0.52–1.25)
GFR SERPL CREATININE-BSD FRML MDRD: 52 ML/MIN/1.73M2
IGA SERPL-MCNC: 146 MG/DL (ref 84–499)
KAPPA LC FREE SER-MCNC: 2.91 MG/DL (ref 0.33–1.94)
KAPPA LC FREE/LAMBDA FREE SER NEPH: 1.24 {RATIO} (ref 0.26–1.65)
LAMBDA LC FREE SERPL-MCNC: 2.34 MG/DL (ref 0.57–2.63)
PHOSPHATE SERPL-MCNC: 3.8 MG/DL (ref 2.5–4.5)
PTH-INTACT SERPL-MCNC: 31 PG/ML (ref 18–80)
TOTAL PROTEIN SERUM FOR ELP: 7.9 G/DL (ref 6.8–8.8)
TSH SERPL DL<=0.005 MIU/L-ACNC: 1.65 MU/L (ref 0.4–4)
TTG IGA SER-ACNC: 0.3 U/ML
TTG IGG SER-ACNC: 1.2 U/ML

## 2021-11-02 PROCEDURE — 84166 PROTEIN E-PHORESIS/URINE/CSF: CPT | Mod: 26 | Performed by: PATHOLOGY

## 2021-11-02 PROCEDURE — 83883 ASSAY NEPHELOMETRY NOT SPEC: CPT | Performed by: INTERNAL MEDICINE

## 2021-11-02 PROCEDURE — 82310 ASSAY OF CALCIUM: CPT | Performed by: PATHOLOGY

## 2021-11-02 PROCEDURE — 83516 IMMUNOASSAY NONANTIBODY: CPT | Mod: 59 | Performed by: INTERNAL MEDICINE

## 2021-11-02 PROCEDURE — 72080 X-RAY EXAM THORACOLMB 2/> VW: CPT | Performed by: STUDENT IN AN ORGANIZED HEALTH CARE EDUCATION/TRAINING PROGRAM

## 2021-11-02 PROCEDURE — 82040 ASSAY OF SERUM ALBUMIN: CPT | Performed by: PATHOLOGY

## 2021-11-02 PROCEDURE — 84155 ASSAY OF PROTEIN SERUM: CPT | Performed by: INTERNAL MEDICINE

## 2021-11-02 PROCEDURE — 84166 PROTEIN E-PHORESIS/URINE/CSF: CPT | Mod: TC | Performed by: PATHOLOGY

## 2021-11-02 PROCEDURE — 84165 PROTEIN E-PHORESIS SERUM: CPT | Mod: 26 | Performed by: PATHOLOGY

## 2021-11-02 PROCEDURE — 84100 ASSAY OF PHOSPHORUS: CPT | Performed by: PATHOLOGY

## 2021-11-02 PROCEDURE — 82306 VITAMIN D 25 HYDROXY: CPT | Performed by: INTERNAL MEDICINE

## 2021-11-02 PROCEDURE — 82565 ASSAY OF CREATININE: CPT | Performed by: PATHOLOGY

## 2021-11-02 PROCEDURE — 84443 ASSAY THYROID STIM HORMONE: CPT | Performed by: PATHOLOGY

## 2021-11-02 PROCEDURE — 82523 COLLAGEN CROSSLINKS: CPT | Mod: 90 | Performed by: PATHOLOGY

## 2021-11-02 PROCEDURE — 99000 SPECIMEN HANDLING OFFICE-LAB: CPT | Performed by: PATHOLOGY

## 2021-11-02 PROCEDURE — 84080 ASSAY ALKALINE PHOSPHATASES: CPT | Mod: 90 | Performed by: PATHOLOGY

## 2021-11-02 PROCEDURE — 36415 COLL VENOUS BLD VENIPUNCTURE: CPT | Performed by: PATHOLOGY

## 2021-11-02 PROCEDURE — 82784 ASSAY IGA/IGD/IGG/IGM EACH: CPT | Performed by: INTERNAL MEDICINE

## 2021-11-02 PROCEDURE — 84165 PROTEIN E-PHORESIS SERUM: CPT | Mod: TC | Performed by: PATHOLOGY

## 2021-11-02 PROCEDURE — 76536 US EXAM OF HEAD AND NECK: CPT | Performed by: RADIOLOGY

## 2021-11-02 PROCEDURE — 83970 ASSAY OF PARATHORMONE: CPT | Performed by: INTERNAL MEDICINE

## 2021-11-03 LAB
ALBUMIN MFR UR ELPH: 100 %
ALBUMIN SERPL ELPH-MCNC: 4.6 G/DL (ref 3.7–5.1)
ALP BONE SERPL-MCNC: 13.7 UG/L
ALPHA1 GLOB SERPL ELPH-MCNC: 0.3 G/DL (ref 0.2–0.4)
ALPHA2 GLOB SERPL ELPH-MCNC: 0.9 G/DL (ref 0.5–0.9)
B-GLOBULIN SERPL ELPH-MCNC: 0.8 G/DL (ref 0.6–1)
GAMMA GLOB SERPL ELPH-MCNC: 1.4 G/DL (ref 0.7–1.6)
M PROTEIN MFR UR ELPH: 0 %
M PROTEIN SERPL ELPH-MCNC: 0 G/DL
PROT PATTERN SERPL ELPH-IMP: NORMAL
PROT PATTERN UR ELPH-IMP: ABNORMAL

## 2021-11-04 ENCOUNTER — MYC MEDICAL ADVICE (OUTPATIENT)
Dept: RHEUMATOLOGY | Facility: CLINIC | Age: 61
End: 2021-11-04
Payer: COMMERCIAL

## 2021-11-04 DIAGNOSIS — T38.0X5A STEROID-INDUCED OSTEOPOROSIS: ICD-10-CM

## 2021-11-04 DIAGNOSIS — M05.79 RHEUMATOID ARTHRITIS INVOLVING MULTIPLE SITES WITH POSITIVE RHEUMATOID FACTOR (H): ICD-10-CM

## 2021-11-04 DIAGNOSIS — M81.8 STEROID-INDUCED OSTEOPOROSIS: ICD-10-CM

## 2021-11-04 LAB
COLLAGEN CTX SERPL-MCNC: 195 PG/ML
KAPPA LC UR-MCNC: <0.9 MG/DL
KAPPA LC/LAMBDA UR: NORMAL {RATIO} (ref 0.7–6.2)
LAMBDA LC UR-MCNC: <0.7 MG/DL

## 2021-11-05 DIAGNOSIS — T38.0X5A STEROID-INDUCED OSTEOPOROSIS: ICD-10-CM

## 2021-11-05 DIAGNOSIS — Z79.52 ON PREDNISONE THERAPY: ICD-10-CM

## 2021-11-05 DIAGNOSIS — D75.89 MACROCYTOSIS WITHOUT ANEMIA: ICD-10-CM

## 2021-11-05 DIAGNOSIS — E04.2 NONTOXIC MULTINODULAR GOITER: ICD-10-CM

## 2021-11-05 DIAGNOSIS — N18.31 STAGE 3A CHRONIC KIDNEY DISEASE (H): ICD-10-CM

## 2021-11-05 DIAGNOSIS — M06.9 RHEUMATOID ARTHRITIS OF BOTH ANKLES, UNSPECIFIED WHETHER RHEUMATOID FACTOR PRESENT (H): ICD-10-CM

## 2021-11-05 DIAGNOSIS — C83.30 DIFFUSE LARGE B-CELL LYMPHOMA, UNSPECIFIED BODY REGION (H): ICD-10-CM

## 2021-11-05 DIAGNOSIS — M81.8 STEROID-INDUCED OSTEOPOROSIS: ICD-10-CM

## 2021-11-05 LAB
CALCIUM 24H UR-MRATE: ABNORMAL MG/(24.H)
CALCIUM UR-MCNC: <5 MG/DL
CALCIUM/CREAT UR: ABNORMAL MG/G{CREAT}
COLLECT DURATION TIME UR: 24 H
CREAT 24H UR-MRATE: 0.73 G/SPEC (ref 0.8–2)
CREAT UR-MCNC: 30 MG/DL
SPECIMEN VOL UR: 2419 ML

## 2021-11-05 PROCEDURE — 82340 ASSAY OF CALCIUM IN URINE: CPT | Performed by: PATHOLOGY

## 2021-11-05 PROCEDURE — 81050 URINALYSIS VOLUME MEASURE: CPT | Performed by: PATHOLOGY

## 2021-11-06 RX ORDER — PREDNISONE 5 MG/1
5 TABLET ORAL DAILY
Qty: 90 TABLET | Refills: 3 | Status: CANCELLED | OUTPATIENT
Start: 2021-11-06

## 2021-11-06 NOTE — TELEPHONE ENCOUNTER
predniSONE (DELTASONE) 5 MG tablet  Last Written Prescription Date:  3/10/21  Last Fill Quantity: 90,   # refills: 3  Last Office Visit : 9/9/21  Future Office visit:  3/10/22    Routing refill request to provider for review/approval because: request per my chart. Pt has rfs on file, however asks for decrease in dose r/t bs elevated. Thanks.

## 2021-11-08 LAB
DEPRECATED CALCIDIOL+CALCIFEROL SERPL-MC: <73 UG/L (ref 20–75)
VITAMIN D2 SERPL-MCNC: <5 UG/L
VITAMIN D3 SERPL-MCNC: 68 UG/L

## 2021-11-10 NOTE — TELEPHONE ENCOUNTER
Maribell Domínguez MD Beard, Madeline, RN  Caller: Unspecified (6 days ago, 11:03 AM)  Phone Number: [phone]     If she wants to taper, recommend slow taper 4-3-2-1 mg every day each for a month then stop.

## 2021-11-11 RX ORDER — PREDNISONE 1 MG/1
TABLET ORAL
Qty: 120 TABLET | Refills: 2 | Status: SHIPPED | OUTPATIENT
Start: 2021-11-11 | End: 2022-03-10

## 2021-11-12 ENCOUNTER — OFFICE VISIT (OUTPATIENT)
Dept: ENDOCRINOLOGY | Facility: CLINIC | Age: 61
End: 2021-11-12
Payer: COMMERCIAL

## 2021-11-12 VITALS
BODY MASS INDEX: 26.17 KG/M2 | HEART RATE: 80 BPM | WEIGHT: 123 LBS | SYSTOLIC BLOOD PRESSURE: 128 MMHG | DIASTOLIC BLOOD PRESSURE: 62 MMHG

## 2021-11-12 DIAGNOSIS — E24.2 IATROGENIC CUSHINGOID FEATURES (H): ICD-10-CM

## 2021-11-12 DIAGNOSIS — E83.52 HYPERCALCEMIA: ICD-10-CM

## 2021-11-12 DIAGNOSIS — M81.8 STEROID-INDUCED OSTEOPOROSIS: Primary | ICD-10-CM

## 2021-11-12 DIAGNOSIS — D75.89 MACROCYTOSIS WITHOUT ANEMIA: ICD-10-CM

## 2021-11-12 DIAGNOSIS — M06.9 RHEUMATOID ARTHRITIS OF BOTH ANKLES, UNSPECIFIED WHETHER RHEUMATOID FACTOR PRESENT (H): ICD-10-CM

## 2021-11-12 DIAGNOSIS — N18.31 STAGE 3A CHRONIC KIDNEY DISEASE (H): ICD-10-CM

## 2021-11-12 DIAGNOSIS — R73.01 IMPAIRED FASTING GLUCOSE: ICD-10-CM

## 2021-11-12 DIAGNOSIS — C83.30 DIFFUSE LARGE B-CELL LYMPHOMA, UNSPECIFIED BODY REGION (H): ICD-10-CM

## 2021-11-12 DIAGNOSIS — E04.2 NONTOXIC MULTINODULAR GOITER: ICD-10-CM

## 2021-11-12 DIAGNOSIS — T38.0X5A STEROID-INDUCED OSTEOPOROSIS: Primary | ICD-10-CM

## 2021-11-12 DIAGNOSIS — Z79.52 ON PREDNISONE THERAPY: ICD-10-CM

## 2021-11-12 PROCEDURE — 99215 OFFICE O/P EST HI 40 MIN: CPT | Performed by: INTERNAL MEDICINE

## 2021-11-12 RX ORDER — ALENDRONATE SODIUM 70 MG/1
70 TABLET ORAL
Qty: 12 TABLET | Refills: 3 | Status: SHIPPED | OUTPATIENT
Start: 2021-11-12 | End: 2022-07-21

## 2021-11-12 ASSESSMENT — ENCOUNTER SYMPTOMS
SLEEP DISTURBANCES DUE TO BREATHING: 0
JOINT SWELLING: 1
SORE THROAT: 0
SYNCOPE: 0
MUSCLE CRAMPS: 1
MEMORY LOSS: 1
HEADACHES: 0
SINUS PAIN: 0
ORTHOPNEA: 0
HYPERTENSION: 1
SEIZURES: 0
EXERCISE INTOLERANCE: 1
MYALGIAS: 1
DOUBLE VISION: 0
STIFFNESS: 1
DIZZINESS: 1
EYE REDNESS: 0
TROUBLE SWALLOWING: 0
MUSCLE WEAKNESS: 1
NECK MASS: 0
SWOLLEN GLANDS: 0
PALPITATIONS: 1
EYE IRRITATION: 1
EYE PAIN: 0
SINUS CONGESTION: 1
TASTE DISTURBANCE: 0
DISTURBANCES IN COORDINATION: 1
WEAKNESS: 1
PARALYSIS: 0
LEG PAIN: 0
HYPOTENSION: 0
LIGHT-HEADEDNESS: 0
ARTHRALGIAS: 1
SMELL DISTURBANCE: 0
LOSS OF CONSCIOUSNESS: 0
TINGLING: 1
BACK PAIN: 0
TREMORS: 0
HOARSE VOICE: 1
EYE WATERING: 0
SPEECH CHANGE: 0
NECK PAIN: 0
NUMBNESS: 1
BRUISES/BLEEDS EASILY: 1

## 2021-11-12 NOTE — LETTER
11/12/2021         RE: Amelia Michel  [address]  [facility] [address]        Dear Colleague,    Thank you for referring your patient, Amelia Michel, to the [facility]. Please see a copy of my visit note below.    Subjective:    Established patient    Amelia Micehl is a 60 year old adult who presents to review management of osteoporosis and thyroid nodularity.     We reviewed all interim test results and her thyroid US in great detail. Thyroid US completed 11/2/2021 reviewed in detail and I agree with the radiology interpretation. Thyroid gland is normal in size. Multiple sub centimeter nodules with only 1 nodule >= 1 cm and this is a 1.8 cm in maximal dimension nodule in the isthmus that is solid and isoechoic without suspicious features and is TR-3.    Objective:     Appears well. Mildly cushingoid.    Assessment/Plan:    # Osteoporosis    Amelia has had a complete evaluation for secondary causes of osteoporosis with notable findings of mild hypercalcemia not related to parathyroid gland autonomy and undetectable 24 hour urine calcium.      Thoracic/lumbar spine XR 11/2/2021 negative for vertebral compression fracture.    We will restart Fosamax at this time. Amelia has a preference for Fosamax over Reclast and over anabolic therapy. I think this is reasonable given her absence of fractures and the fact that her bone mineral density is improved on Fosamax and in the osteopenic range.  We reviewed the appropriate way to take Fosamax to maximize absorption and minimize the risk for esophagitis.  We also reviewed potential adverse effects of Fosamax including atypical femoral fracture and osteonecrosis of the jaw.  Return in 1 year with bone labs ordered.  She was restarted on Fosamax around January 2019 and took it continuously until April 2021.  Therefore I anticipate continuing Fosamax until probably summer/fall 2024 and then likely taking a drug holiday.    We  reviewed optimization of calcium and vitamin D.  Her dietary calcium intake does vary quite a bit.  She will let me know what her current calcium and vitamin dosing are and I will recommend specific doses. Recent 24 hour urine collection with undetectable calcium and recent vitamin D level 68 (ref range 20 - 75 micrograms/L).    SPEP/UPEP negative. Mildly elevated serum kappa light chain likely related to CKD-3 and not affecting bone health. Will let PCP know re need for follow up of this.      # Glucocorticoid taper for rheumatoid arthritis    Reviewed that she is mildly cushingoid.  She has been on prednisone 5 mg daily for rheumatoid arthritis chronically.  She plans to decrease to 4 mg daily after discussion with her rheumatologist and ultimately 3 mg daily.  Reviewed that 3 mg is probably closer to her endogenous production of cortisol and if she wants to taper below this to let me know and we will transition to hydrocortisone.  She plans to probably stay on a maintenance dose of 3 mg daily.  The lower dose of prednisone will be beneficial for bone health and metabolic comorbidities.    # Thyroid nodule    Biochemically euthyroid without compressive symptoms.  We discussed that we could either FNA the 1.8 cm isthumus nodule or observe.  FNA would be consistent with American thyroid Association guidelines and observation would be consistent with TI-RADS guidelines.  Amelia prefers observation.  Return in 1 year with neck ultrasound and TSH ordered.    # Dysglycemia     Has had HbA1c in the prediabetic range for a few years. Fasting glucose 126 mg/dL 10/13/2021.  mg/dL 10/15/2021. She has not previously been on medication to lower glucose.    Reviewed that the sensitivity of hemoglobin A1c together with fasting plasma glucose to diagnosed DM is lower than expected.  Particularly given her use of prednisone I would not be surprised if she had significant postprandial hyperglycemia.  Recommended oral  glucose tolerance test for definitive diagnosis of diabetes but she prefers to defer at this time.  I do suspect she may have diabetic range hyperglycemia.  Would be reasonable for her primary care provider to monitor for microvascular complications and consider Metformin.  Follow-up in this regard per her PCP.     56 minutes spent on the date of the encounter doing chart review, history and exam, documentation and further activities as noted above.         Again, thank you for allowing me to participate in the care of your patient.        Sincerely,        Dima Shrestha MD

## 2021-11-13 DIAGNOSIS — M81.8 STEROID-INDUCED OSTEOPOROSIS: Primary | ICD-10-CM

## 2021-11-13 DIAGNOSIS — T38.0X5A STEROID-INDUCED OSTEOPOROSIS: Primary | ICD-10-CM

## 2021-11-13 DIAGNOSIS — E83.52 HYPERCALCEMIA: ICD-10-CM

## 2021-11-13 DIAGNOSIS — N18.31 STAGE 3A CHRONIC KIDNEY DISEASE (H): ICD-10-CM

## 2021-11-13 DIAGNOSIS — E04.2 NONTOXIC MULTINODULAR GOITER: ICD-10-CM

## 2021-11-13 NOTE — PROGRESS NOTES
Subjective:    Established patient    Amelia Michel is a 60 year old adult who presents to review management of osteoporosis and thyroid nodularity.     We reviewed all interim test results and her thyroid US in great detail. Thyroid US completed 11/2/2021 reviewed in detail and I agree with the radiology interpretation. Thyroid gland is normal in size. Multiple sub centimeter nodules with only 1 nodule >= 1 cm and this is a 1.8 cm in maximal dimension nodule in the isthmus that is solid and isoechoic without suspicious features and is TR-3.    Objective:     Appears well. Mildly cushingoid.    Assessment/Plan:    # Osteoporosis    Amelia has had a complete evaluation for secondary causes of osteoporosis with notable findings of mild hypercalcemia not related to parathyroid gland autonomy and undetectable 24 hour urine calcium.      Thoracic/lumbar spine XR 11/2/2021 negative for vertebral compression fracture.    We will restart Fosamax at this time. Amelia has a preference for Fosamax over Reclast and over anabolic therapy. I think this is reasonable given her absence of fractures and the fact that her bone mineral density is improved on Fosamax and in the osteopenic range.  We reviewed the appropriate way to take Fosamax to maximize absorption and minimize the risk for esophagitis.  We also reviewed potential adverse effects of Fosamax including atypical femoral fracture and osteonecrosis of the jaw.  Return in 1 year with bone labs ordered.  She was restarted on Fosamax around January 2019 and took it continuously until April 2021.  Therefore I anticipate continuing Fosamax until probably summer/fall 2024 and then likely taking a drug holiday.    We reviewed optimization of calcium and vitamin D.  Her dietary calcium intake does vary quite a bit.  She will let me know what her current calcium and vitamin dosing are and I will recommend specific doses. Recent 24 hour urine collection with undetectable  calcium and recent vitamin D level 68 (ref range 20 - 75 micrograms/L).    SPEP/UPEP negative. Mildly elevated serum kappa light chain likely related to CKD-3 and not affecting bone health. Will let PCP know re need for follow up of this.      # Glucocorticoid taper for rheumatoid arthritis    Reviewed that she is mildly cushingoid.  She has been on prednisone 5 mg daily for rheumatoid arthritis chronically.  She plans to decrease to 4 mg daily after discussion with her rheumatologist and ultimately 3 mg daily.  Reviewed that 3 mg is probably closer to her endogenous production of cortisol and if she wants to taper below this to let me know and we will transition to hydrocortisone.  She plans to probably stay on a maintenance dose of 3 mg daily.  The lower dose of prednisone will be beneficial for bone health and metabolic comorbidities.    # Thyroid nodule    Biochemically euthyroid without compressive symptoms.  We discussed that we could either FNA the 1.8 cm isthumus nodule or observe.  FNA would be consistent with American thyroid Association guidelines and observation would be consistent with TI-RADS guidelines.  Amelia prefers observation.  Return in 1 year with neck ultrasound and TSH ordered.    # Dysglycemia     Has had HbA1c in the prediabetic range for a few years. Fasting glucose 126 mg/dL 10/13/2021.  mg/dL 10/15/2021. She has not previously been on medication to lower glucose.    Reviewed that the sensitivity of hemoglobin A1c together with fasting plasma glucose to diagnosed DM is lower than expected.  Particularly given her use of prednisone I would not be surprised if she had significant postprandial hyperglycemia.  Recommended oral glucose tolerance test for definitive diagnosis of diabetes but she prefers to defer at this time.  I do suspect she may have diabetic range hyperglycemia.  Would be reasonable for her primary care provider to monitor for microvascular complications and  consider Metformin.  Follow-up in this regard per her PCP.     56 minutes spent on the date of the encounter doing chart review, history and exam, documentation and further activities as noted above.

## 2021-11-24 ENCOUNTER — HOSPITAL ENCOUNTER (OUTPATIENT)
Dept: MAMMOGRAPHY | Facility: CLINIC | Age: 61
Discharge: HOME | End: 2021-11-24
Attending: PHYSICIAN ASSISTANT | Admitting: PHYSICIAN ASSISTANT
Payer: COMMERCIAL

## 2021-11-24 DIAGNOSIS — Z12.31 VISIT FOR SCREENING MAMMOGRAM: ICD-10-CM

## 2021-11-24 PROCEDURE — 77067 SCR MAMMO BI INCL CAD: CPT

## 2021-12-01 ENCOUNTER — ANCILLARY PROCEDURE (OUTPATIENT)
Dept: CARDIOLOGY | Facility: CLINIC | Age: 61
End: 2021-12-01
Attending: INTERNAL MEDICINE
Payer: COMMERCIAL

## 2021-12-01 DIAGNOSIS — R00.1 BRADYCARDIA: ICD-10-CM

## 2021-12-01 PROCEDURE — 93296 REM INTERROG EVL PM/IDS: CPT

## 2021-12-01 PROCEDURE — 93294 REM INTERROG EVL PM/LDLS PM: CPT | Performed by: INTERNAL MEDICINE

## 2021-12-28 LAB
MDC_IDC_EPISODE_DTM: NORMAL
MDC_IDC_EPISODE_DURATION: 10 S
MDC_IDC_EPISODE_DURATION: 10 S
MDC_IDC_EPISODE_DURATION: 13 S
MDC_IDC_EPISODE_DURATION: 13 S
MDC_IDC_EPISODE_DURATION: 14 S
MDC_IDC_EPISODE_DURATION: 15 S
MDC_IDC_EPISODE_DURATION: 1551 S
MDC_IDC_EPISODE_DURATION: 17 S
MDC_IDC_EPISODE_DURATION: 19 S
MDC_IDC_EPISODE_DURATION: 19 S
MDC_IDC_EPISODE_DURATION: 21 S
MDC_IDC_EPISODE_DURATION: 28 S
MDC_IDC_EPISODE_DURATION: 3 S
MDC_IDC_EPISODE_DURATION: 350 S
MDC_IDC_EPISODE_DURATION: 42 S
MDC_IDC_EPISODE_DURATION: 7 S
MDC_IDC_EPISODE_DURATION: 9 S
MDC_IDC_EPISODE_DURATION: 9 S
MDC_IDC_EPISODE_DURATION: 94 S
MDC_IDC_EPISODE_DURATION: NORMAL S
MDC_IDC_EPISODE_ID: 3831
MDC_IDC_EPISODE_ID: 3832
MDC_IDC_EPISODE_ID: 3833
MDC_IDC_EPISODE_ID: 3834
MDC_IDC_EPISODE_ID: 3835
MDC_IDC_EPISODE_ID: 3836
MDC_IDC_EPISODE_ID: 3837
MDC_IDC_EPISODE_ID: 3838
MDC_IDC_EPISODE_ID: 3839
MDC_IDC_EPISODE_ID: 3840
MDC_IDC_EPISODE_ID: 3841
MDC_IDC_EPISODE_ID: 3842
MDC_IDC_EPISODE_ID: 3843
MDC_IDC_EPISODE_ID: 3844
MDC_IDC_EPISODE_ID: 3845
MDC_IDC_EPISODE_ID: 3846
MDC_IDC_EPISODE_ID: 3847
MDC_IDC_EPISODE_ID: 3848
MDC_IDC_EPISODE_ID: 3849
MDC_IDC_EPISODE_ID: 3850
MDC_IDC_EPISODE_ID: 3851
MDC_IDC_EPISODE_ID: 3852
MDC_IDC_EPISODE_ID: 3853
MDC_IDC_EPISODE_ID: 3854
MDC_IDC_EPISODE_ID: 3855
MDC_IDC_EPISODE_ID: 3856
MDC_IDC_EPISODE_TYPE: NORMAL
MDC_IDC_LEAD_IMPLANT_DT: NORMAL
MDC_IDC_LEAD_IMPLANT_DT: NORMAL
MDC_IDC_LEAD_LOCATION: NORMAL
MDC_IDC_LEAD_LOCATION: NORMAL
MDC_IDC_LEAD_LOCATION_DETAIL_1: NORMAL
MDC_IDC_LEAD_LOCATION_DETAIL_1: NORMAL
MDC_IDC_LEAD_MFG: NORMAL
MDC_IDC_LEAD_MFG: NORMAL
MDC_IDC_LEAD_MODEL: NORMAL
MDC_IDC_LEAD_MODEL: NORMAL
MDC_IDC_LEAD_POLARITY_TYPE: NORMAL
MDC_IDC_LEAD_POLARITY_TYPE: NORMAL
MDC_IDC_LEAD_SERIAL: NORMAL
MDC_IDC_LEAD_SERIAL: NORMAL
MDC_IDC_LEAD_SPECIAL_FUNCTION: NORMAL
MDC_IDC_LEAD_SPECIAL_FUNCTION: NORMAL
MDC_IDC_MSMT_BATTERY_DTM: NORMAL
MDC_IDC_MSMT_BATTERY_REMAINING_LONGEVITY: 151 MO
MDC_IDC_MSMT_BATTERY_RRT_TRIGGER: 2.62
MDC_IDC_MSMT_BATTERY_STATUS: NORMAL
MDC_IDC_MSMT_BATTERY_VOLTAGE: 3.02 V
MDC_IDC_MSMT_LEADCHNL_RA_IMPEDANCE_VALUE: 342 OHM
MDC_IDC_MSMT_LEADCHNL_RA_IMPEDANCE_VALUE: 418 OHM
MDC_IDC_MSMT_LEADCHNL_RA_PACING_THRESHOLD_AMPLITUDE: 0.38 V
MDC_IDC_MSMT_LEADCHNL_RA_PACING_THRESHOLD_PULSEWIDTH: 0.4 MS
MDC_IDC_MSMT_LEADCHNL_RA_SENSING_INTR_AMPL: 0.62 MV
MDC_IDC_MSMT_LEADCHNL_RA_SENSING_INTR_AMPL: 0.62 MV
MDC_IDC_MSMT_LEADCHNL_RV_IMPEDANCE_VALUE: 437 OHM
MDC_IDC_MSMT_LEADCHNL_RV_IMPEDANCE_VALUE: 494 OHM
MDC_IDC_MSMT_LEADCHNL_RV_PACING_THRESHOLD_AMPLITUDE: 0.5 V
MDC_IDC_MSMT_LEADCHNL_RV_PACING_THRESHOLD_PULSEWIDTH: 0.4 MS
MDC_IDC_MSMT_LEADCHNL_RV_SENSING_INTR_AMPL: 8 MV
MDC_IDC_MSMT_LEADCHNL_RV_SENSING_INTR_AMPL: 8 MV
MDC_IDC_PG_IMPLANT_DTM: NORMAL
MDC_IDC_PG_MFG: NORMAL
MDC_IDC_PG_MODEL: NORMAL
MDC_IDC_PG_SERIAL: NORMAL
MDC_IDC_PG_TYPE: NORMAL
MDC_IDC_SESS_CLINIC_NAME: NORMAL
MDC_IDC_SESS_DTM: NORMAL
MDC_IDC_SESS_TYPE: NORMAL
MDC_IDC_SET_BRADY_AT_MODE_SWITCH_RATE: 171 {BEATS}/MIN
MDC_IDC_SET_BRADY_HYSTRATE: NORMAL
MDC_IDC_SET_BRADY_LOWRATE: 60 {BEATS}/MIN
MDC_IDC_SET_BRADY_MAX_SENSOR_RATE: 130 {BEATS}/MIN
MDC_IDC_SET_BRADY_MAX_TRACKING_RATE: 130 {BEATS}/MIN
MDC_IDC_SET_BRADY_MODE: NORMAL
MDC_IDC_SET_BRADY_PAV_DELAY_LOW: 180 MS
MDC_IDC_SET_BRADY_SAV_DELAY_LOW: 150 MS
MDC_IDC_SET_LEADCHNL_RA_PACING_AMPLITUDE: 1.5 V
MDC_IDC_SET_LEADCHNL_RA_PACING_ANODE_ELECTRODE_1: NORMAL
MDC_IDC_SET_LEADCHNL_RA_PACING_ANODE_LOCATION_1: NORMAL
MDC_IDC_SET_LEADCHNL_RA_PACING_CAPTURE_MODE: NORMAL
MDC_IDC_SET_LEADCHNL_RA_PACING_CATHODE_ELECTRODE_1: NORMAL
MDC_IDC_SET_LEADCHNL_RA_PACING_CATHODE_LOCATION_1: NORMAL
MDC_IDC_SET_LEADCHNL_RA_PACING_POLARITY: NORMAL
MDC_IDC_SET_LEADCHNL_RA_PACING_PULSEWIDTH: 0.4 MS
MDC_IDC_SET_LEADCHNL_RA_SENSING_ANODE_ELECTRODE_1: NORMAL
MDC_IDC_SET_LEADCHNL_RA_SENSING_ANODE_LOCATION_1: NORMAL
MDC_IDC_SET_LEADCHNL_RA_SENSING_CATHODE_ELECTRODE_1: NORMAL
MDC_IDC_SET_LEADCHNL_RA_SENSING_CATHODE_LOCATION_1: NORMAL
MDC_IDC_SET_LEADCHNL_RA_SENSING_POLARITY: NORMAL
MDC_IDC_SET_LEADCHNL_RA_SENSING_SENSITIVITY: 0.3 MV
MDC_IDC_SET_LEADCHNL_RV_PACING_AMPLITUDE: 2 V
MDC_IDC_SET_LEADCHNL_RV_PACING_ANODE_ELECTRODE_1: NORMAL
MDC_IDC_SET_LEADCHNL_RV_PACING_ANODE_LOCATION_1: NORMAL
MDC_IDC_SET_LEADCHNL_RV_PACING_CAPTURE_MODE: NORMAL
MDC_IDC_SET_LEADCHNL_RV_PACING_CATHODE_ELECTRODE_1: NORMAL
MDC_IDC_SET_LEADCHNL_RV_PACING_CATHODE_LOCATION_1: NORMAL
MDC_IDC_SET_LEADCHNL_RV_PACING_POLARITY: NORMAL
MDC_IDC_SET_LEADCHNL_RV_PACING_PULSEWIDTH: 0.4 MS
MDC_IDC_SET_LEADCHNL_RV_SENSING_ANODE_ELECTRODE_1: NORMAL
MDC_IDC_SET_LEADCHNL_RV_SENSING_ANODE_LOCATION_1: NORMAL
MDC_IDC_SET_LEADCHNL_RV_SENSING_CATHODE_ELECTRODE_1: NORMAL
MDC_IDC_SET_LEADCHNL_RV_SENSING_CATHODE_LOCATION_1: NORMAL
MDC_IDC_SET_LEADCHNL_RV_SENSING_POLARITY: NORMAL
MDC_IDC_SET_LEADCHNL_RV_SENSING_SENSITIVITY: 0.9 MV
MDC_IDC_SET_ZONE_DETECTION_INTERVAL: 350 MS
MDC_IDC_SET_ZONE_DETECTION_INTERVAL: 400 MS
MDC_IDC_SET_ZONE_TYPE: NORMAL
MDC_IDC_STAT_AT_BURDEN_PERCENT: 100 %
MDC_IDC_STAT_AT_DTM_END: NORMAL
MDC_IDC_STAT_AT_DTM_START: NORMAL
MDC_IDC_STAT_BRADY_DTM_END: NORMAL
MDC_IDC_STAT_BRADY_DTM_START: NORMAL
MDC_IDC_STAT_BRADY_RA_PERCENT_PACED: 0.05 %
MDC_IDC_STAT_BRADY_RV_PERCENT_PACED: 68.44 %
MDC_IDC_STAT_EPISODE_RECENT_COUNT: 0
MDC_IDC_STAT_EPISODE_RECENT_COUNT: 12
MDC_IDC_STAT_EPISODE_RECENT_COUNT: 14
MDC_IDC_STAT_EPISODE_RECENT_COUNT_DTM_END: NORMAL
MDC_IDC_STAT_EPISODE_RECENT_COUNT_DTM_START: NORMAL
MDC_IDC_STAT_EPISODE_TOTAL_COUNT: 0
MDC_IDC_STAT_EPISODE_TOTAL_COUNT: 0
MDC_IDC_STAT_EPISODE_TOTAL_COUNT: 138
MDC_IDC_STAT_EPISODE_TOTAL_COUNT: 3691
MDC_IDC_STAT_EPISODE_TOTAL_COUNT: 7
MDC_IDC_STAT_EPISODE_TOTAL_COUNT_DTM_END: NORMAL
MDC_IDC_STAT_EPISODE_TOTAL_COUNT_DTM_START: NORMAL
MDC_IDC_STAT_EPISODE_TYPE: NORMAL

## 2021-12-30 ENCOUNTER — TRANSFERRED RECORDS (OUTPATIENT)
Dept: HEALTH INFORMATION MANAGEMENT | Facility: CLINIC | Age: 61
End: 2021-12-30
Payer: COMMERCIAL

## 2022-02-02 ENCOUNTER — TELEPHONE (OUTPATIENT)
Dept: RHEUMATOLOGY | Facility: CLINIC | Age: 62
End: 2022-02-02
Payer: COMMERCIAL

## 2022-02-02 VITALS — BODY MASS INDEX: 26.18 KG/M2 | WEIGHT: 123.02 LBS

## 2022-02-02 NOTE — TELEPHONE ENCOUNTER
Plaquenil Eye Exam    Date of last eye exam specifically commenting on HCQ toxicity: 12/30/2021  Clinic: [facility] Phone Number: [phone]  Ophthalmologist: Ramon Sousa  Toxicity: NO  Records scanned to chart: Yes      Jackie Beaver CMA   2/2/2022 2:23 PM

## 2022-02-17 PROBLEM — Z76.89 HEALTH CARE HOME: Status: RESOLVED | Noted: 2017-08-21 | Resolved: 2019-10-16

## 2022-03-10 ENCOUNTER — OFFICE VISIT (OUTPATIENT)
Dept: RHEUMATOLOGY | Facility: CLINIC | Age: 62
End: 2022-03-10
Attending: INTERNAL MEDICINE
Payer: COMMERCIAL

## 2022-03-10 ENCOUNTER — TELEPHONE (OUTPATIENT)
Dept: FAMILY MEDICINE | Facility: CLINIC | Age: 62
End: 2022-03-10

## 2022-03-10 VITALS
OXYGEN SATURATION: 100 % | DIASTOLIC BLOOD PRESSURE: 69 MMHG | SYSTOLIC BLOOD PRESSURE: 92 MMHG | HEART RATE: 88 BPM | BODY MASS INDEX: 25.22 KG/M2 | WEIGHT: 118.5 LBS | TEMPERATURE: 98 F

## 2022-03-10 DIAGNOSIS — Z79.52 LONG TERM (CURRENT) USE OF SYSTEMIC STEROIDS: ICD-10-CM

## 2022-03-10 DIAGNOSIS — M05.79 RHEUMATOID ARTHRITIS INVOLVING MULTIPLE SITES WITH POSITIVE RHEUMATOID FACTOR (H): Primary | ICD-10-CM

## 2022-03-10 DIAGNOSIS — Z79.899 LONG-TERM USE OF PLAQUENIL: ICD-10-CM

## 2022-03-10 DIAGNOSIS — Z23 NEED FOR COVID-19 VACCINE: ICD-10-CM

## 2022-03-10 PROCEDURE — G0463 HOSPITAL OUTPT CLINIC VISIT: HCPCS | Mod: 25

## 2022-03-10 PROCEDURE — 91305 HC RX IP 250 OP 636: CPT | Performed by: INTERNAL MEDICINE

## 2022-03-10 PROCEDURE — 250N000011 HC RX IP 250 OP 636: Performed by: INTERNAL MEDICINE

## 2022-03-10 PROCEDURE — 0054A HC ADMIN COVID VAC PFIZER TRS-SUCR, BOOSTER: CPT | Performed by: INTERNAL MEDICINE

## 2022-03-10 PROCEDURE — 99214 OFFICE O/P EST MOD 30 MIN: CPT | Performed by: INTERNAL MEDICINE

## 2022-03-10 RX ORDER — PREDNISONE 1 MG/1
3 TABLET ORAL DAILY
Qty: 270 TABLET | Refills: 3 | Status: SHIPPED | OUTPATIENT
Start: 2022-03-10 | End: 2023-03-02

## 2022-03-10 RX ADMIN — BNT162B2 30 MCG: 0.23 INJECTION, SUSPENSION INTRAMUSCULAR at 11:39

## 2022-03-10 NOTE — PROGRESS NOTES
Last seen by Frederic Gongora NP: 3/10/2021    Last seen: 9/9/2021    DOS: 3/10/2022    Reason for in person follow up visit: RA    [facility] - Rheumatology Clinic Visit    Amelia Michel  is a 60 year old here for rheumatoid arthritis (RA) dx 35 y/o. +CCP >331 -RF -KALYAN panel. 5-2108 XR 5-2018 DDD and facet osteoarthritis, congential fusion C2-3) prednisone since dx at 35 y/o, hydroxychloroquine (plaquenil) long-time since dx tapered by 200 mg 9-2019. Lymphoma in liver, heart, bone and mass between esophagus, left pelvic, right ankle.     Review-Dr. Dumas in [facility] for many years. She had relatively inactive disease prior to her arrest but was on methotrexate 10 mg PO per week, Arava 10 mg daily, Plaquenil 200 mg twice per day, and prednisone 3 mg daily. She has had partial fusion of her right wrist. After discharge from the hospital she was put on prednisone 5 mg daily as monotherapy. Sulfasalazine -not effective long-time, initial alan but resolved after retried. Past Dr. Cunha and Dr. 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 Treated concervatively with plaquenil and 5mg prednisone, and not interested in rituximab or other biologics. She has previously been treated with sulfasalazine and initially developed a rash which was initially thought to be a sulfa rash, but her rash resolved and did not reoccur after being placed back on sulfasalazine. She was also treated with Arava, but this discontinued during her cancer treatment. She feels that these drugs have had no effect on her symptoms. Etanercept (enbrel) in past no benefits.     March 10, 2021  Denies any fever, chills, SOB, MONTALVO, night sweats, or chest pain, high fever, cough, travel in last 14 days or exposure to covid-19 (coronavirus). Reports healthy. Follows CDC guidelines. Sinus issues for a week other then that  "fine. Not out tho either. Will be getting vaccine signing in now.     Arthritis is hand bothering her. Left worse, left hand neuropathy from antecubital IV reaction \"take out muscle and fat\". Its her thumb gets her issues. Hurts all the time.  Not swelling. Base of thumb some tingling. The same. With her history cardiac arrest and chemo does have splint, does not need OT. Walking now and mentally doing good.      Right ankle triple arthrodesis Oct 2020, learning how to walk properly. \"much better\".  Doing more more active.     Fosamax 70 mg every 7 days restarted 9-2020 per Dr. Maribell Domínguez, was on with PCP  In past \"not concerned on for too long\" will need future time off of this was off for 18 month. Prednisone 5 mg once day chronically, hydroxychloroquine (plaquenil) 200 mg once day -eye exam in Dec 2020 early catarct          9/9/2021: Ms. Michel is doing fairly well with no major complaints or RA flare ups on  mg qd+predniosne 5 mg every day. Eye exam is updated. Fully vaccinated. PCP told her to hold fosamax till further discussion in Oct.      Has pain over palms. This gets worse throughout the day. No AM stiffness. Does more chores around the house which is a trigger.     1st covid vaccine caused body ache, second dose caused fatigue.    Today 3/9/2022:    No joint swelling, has edema due HF, joints hurt, warm shower helps, lost strength, has stiffness all day.    Eye exam is due 1/2023    Off pred since 3/3 .      PMH:  Injury-left thumb amputation, left finger broke  Medical-  Antiplatelet or antithrombotic long-term use  Arrhythmia  Atrial tachycardia, flutter  Paroxysmal atrial fibrillation  Arthritis  Lymphoma  Cardiac arrest  history of chemotherapy  Primary pulmonary hypertension  Neuropathy  Postoperative nausea and vomiting  Rheumatoid arthritis  Hyperlipidemia LDL goal <130  Lymphedema of both lower extremities  Cardiogenic shock and cardiac arrest 5/2017  Acute respiratory failure with " "hypoxia  Critical illness myopathy  Sepsis  Wound of left upper extremity  Diffuse large B-cell lymphoma of extranodal site  Febrile neutropenia  Physical deconditioning  Palpitations  Osteoporosis  -DEXA 2018   Slowly healing wound in medial left antecubital fossa after traumatic IV  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, latter felt from lymphoma   Neuropathy of right foot with weakness after intrathecal cytarabine  perioditis   Right arm PICC blood clots during cancer treatments  Right shoulder effusion when had pericarditis       Past Surgical History:  Anesthesia cardioversion  Right wrist arthrodesis, partial fusion \"history migrated out\"  Bone marrow aspirate & biopsy  Excise lesion upper extremity - left wound excision and closure, possible submuscular transposition of ulnar nerve  Foot surgery - 4 left, 2 right  Carpal tunnel bilateral release  Repair ventricular septal defect  Transpositional ulnar nerve (elbow) left   Right shoulder effusion history     Family History:   No autoimmune disorders, psoriasis, UC, crohn's, SLE, RA, PsA, gout, autoimmune thyroid.  No MS, heart disease in family  Mother - breast cancer, hypertension, alzheimer disease-passed   Father - hypertension, lymphoma, circulatory A fib-passed  Paternal grandmother - diabetes  Siblings-younger sister heatlhy PB, younger brother think arthritis not dx PB     Social History:   No smoking or tobacco use. No alcohol use. No IVDU. None Children. Right handed. . Disability          ROS:    A comprehensive ROS was done. Positives are per HPI.      Ph. E:    No exam    Labs/Imaging:  Eye Exam (10/23/18)  Significant for mild H-lated nuclear cataracts.   Ocular CT was recommended for right eye.     CT Chest/Abdomen/Pelvis (9/26/18)  In this patient with a history of lymphoma:  1. No evidence of disease recurrence, consistent with complete  response per Lugano criteria.  2. Stable cardiomegaly. Improved " pericardial effusion.  3. Early contrast phase with heterogeneous visceral enhancement in the  abdomen, likely secondary to cardiac dysfunction.    MRI Cardiac w/contrast (4/12/18)  Loculated exudative pericardial effusion that is beginning to organize, with diffuse  pericardial enhancement consistent with ongoing inflammation. Normal LV fxn, LVEF 54% and RVEF 49%. At  least moderate, possibly severe tricuspid regurgitation. Compared to MRI from 03/2018, effusion is largely  unchanged in size (maybe a bit smaller) but is starting to organize. No change in pericardial enhancement.    Laboratory:   Component      Latest Ref Rng & Units 3/25/2021 5/26/2021 6/23/2021 7/21/2021   WBC      4.0 - 11.0 thou/uL 5.6      RBC Count      3.80 - 5.40 mill/uL 4.31      Hemoglobin      12.0 - 16.0 g/dL 15.4      Hematocrit      35.0 - 47.0 % 44.6      MCV      80 - 100 fL 104 (H)      MCH      27.0 - 34.0 pg 35.7 (H)      MCHC      32.0 - 36.0 g/dL 34.5      RDW      11.0 - 14.5 % 14.4      Platelet Count      140 - 440 thou/uL 126 (L)      Mean Platelet Volume      7.0 - 10.0 fL 9.5      % Neutrophils      50 - 70 % 80 (H)      % Lymphocytes      20 - 40 % 10 (L)      % Monocytes      2 - 10 % 9      % Eosinophils      0 - 6 % 1      % Basophils      0 - 2 % 0      % Immature Granulocytes      <=0 % 0      Absolute Neutrophils      2.0 - 7.7 thou/uL 4.5      Absolute Lymphocytes      0.8 - 4.4 thou/uL 0.6 (L)      Absolute Monocytes      0.0 - 0.9 thou/uL 0.5      Eosinophils Absolute      0.0 - 0.4 thou/uL 0.0      Absolute Basophils      0.0 - 0.2 thou/uL 0.0      Absolute Immature Granulocytes      <=0.0 thou/uL 0.0      Sodium      133 - 144 mmol/L  136 136 137   Potassium      3.4 - 5.3 mmol/L  3.6 4.2 3.9   Chloride      94 - 109 mmol/L  101 102 103   Carbon Dioxide      20 - 32 mmol/L  24 28 28   Anion Gap      3 - 14 mmol/L  11 6 6   Glucose      70 - 99 mg/dL  100 (H) 98 110 (H)   Urea Nitrogen      7 - 30 mg/dL  21 25  29   Creatinine      0.52 - 1.25 mg/dL  1.00 1.05 (H) 1.02   GFR Estimate      >60 mL/min/1.73m2  61 57 (L) 60 (L)   GFR Estimate If Black      >60 mL/min/1.73:m2  71 67    Calcium      8.5 - 10.1 mg/dL  9.3 10.0 9.6   Bilirubin Direct      0.0 - 0.2 mg/dL   0.5 (H)    Bilirubin Total      0.2 - 1.3 mg/dL   1.2    Albumin      3.4 - 5.0 g/dL   4.2    Protein Total      6.8 - 8.8 g/dL   7.5    Alkaline Phosphatase      40 - 150 U/L   136    ALT      0 - 50 U/L   41    AST      0 - 45 U/L   36    N-Terminal Pro Bnp      0 - 125 pg/mL  1,274 (H)     Hemoglobin A1C      0 - 5.6 %  6.2 (H)     Immunoglobulin G      700-1,700 mg/dL 1,179          Impression/Plan:  1. Seropositive rheumatoid arthritis (, RF 31) with multiple joint disability including MTP fusion 1-5 bilaterally, partial right wrist fusion, subluxation and ulnar deformity of MCP's. Rheumatoid arthritis (RA) controlled.  Will rseume prednisone at 3 mg every day (more pain, stiffness off prednisone), and hydroxychloroquine (plaquenil) 200 mg once a day.     2. Long-term hydroxychloroquine (plaquenil) use since 6-2017,  no toxicity, reviewed AAO guidelines, repeat every year.    3. Diffuse large B-cell lymphoma status-post 1 cycle R-EPOCH, 6 cycles R-CHOP; Neuropathy of lower extremities attributed to lymphoma improved on gabapentin 200 mg TID-tolerating. Loculated pericardial effusion, etiology unclear (lymphoma). No symptoms now.     per oncology for surveillance Seeing next week    4. Osteoporosis, chronic long-term corticosteroid use with cushingoid appearance. Received 6-7 years of alendronate in early 2000s. Restarted alendronate in 1/2019, off 3-2020 to 9-2020 then restarted 9-2020. DEXA  T-2.6 thoracic. If > 5 yr higher risk atypical Fx. Off fosamax at last visit given improvement of bone density in 3/2021 but on chronic steroid tx, did refer to endocrinology to manage steroid induced osteoporosis and endocrinology resumed it in  11/2021. Calcium and GFR normal  .    5. Discussed covid booster vaccine      Plan:    Pfizer booster today    Labs on 4/20    Resume prednisone at 3 mg a day    Return in 6 months (in person)      Maribell Domínguez MD      Orders Placed This Encounter   Procedures     CRP inflammation     Erythrocyte sedimentation rate auto     ALT     Albumin level     AST     Creatinine     Nursing Instructions for Covid-19 Vaccine     Vital Signs for Hypersensitivity Reactions     Cold pack (aka APPLY PACK)  PRN     Notify Provider     CBC with Platelets & Differential

## 2022-03-10 NOTE — TELEPHONE ENCOUNTER
Prior Authorization Retail Medication Request    Medication/Dose:   gabapentin (NEURONTIN) 100 MG capsule    Patient ID [id number]  Plan # [phone]

## 2022-03-10 NOTE — LETTER
3/10/2022       RE: Amelia Michel  [address]  [place] [facility] [address]     Dear Colleague,    Thank you for referring your patient, Amelia Michel, to the [facility] [place] at [facility]. Please see a copy of my visit note below.    Last seen by Frederic Gongora NP: 3/10/2021    Last seen: 9/9/2021    DOS: 3/10/2022    Reason for in person follow up visit: RA    [facility] - Rheumatology Clinic Visit    Amelia Michel  is a 60 year old here for rheumatoid arthritis (RA) dx 33 y/o. +CCP >331 -RF -KALYAN panel. 5-2108 XR 5-2018 DDD and facet osteoarthritis, congential fusion C2-3) prednisone since dx at 33 y/o, hydroxychloroquine (plaquenil) long-time since dx tapered by 200 mg 9-2019. Lymphoma in liver, heart, bone and mass between esophagus, left pelvic, right ankle.     Review-Dr. Dumas in [facility] for many years. She had relatively inactive disease prior to her arrest but was on methotrexate 10 mg PO per week, Arava 10 mg daily, Plaquenil 200 mg twice per day, and prednisone 3 mg daily. She has had partial fusion of her right wrist. After discharge from the hospital she was put on prednisone 5 mg daily as monotherapy. Sulfasalazine -not effective long-time, initial alan but resolved after retried. Past Dr. Cunha and Dr. 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 Treated concervatively with plaquenil and 5mg prednisone, and not interested in rituximab or other biologics. She has previously been treated with sulfasalazine and initially developed a rash which was initially thought to be a sulfa rash, but her rash resolved and did not reoccur after being placed back on sulfasalazine. She was also treated with Arava, but this discontinued during her cancer treatment. She feels that these drugs have  "had no effect on her symptoms. Etanercept (enbrel) in past no benefits.     March 10, 2021  Denies any fever, chills, SOB, MONTALVO, night sweats, or chest pain, high fever, cough, travel in last 14 days or exposure to covid-19 (coronavirus). Reports healthy. Follows CDC guidelines. Sinus issues for a week other then that fine. Not out tho either. Will be getting vaccine signing in now.     Arthritis is hand bothering her. Left worse, left hand neuropathy from antecubital IV reaction \"take out muscle and fat\". Its her thumb gets her issues. Hurts all the time.  Not swelling. Base of thumb some tingling. The same. With her history cardiac arrest and chemo does have splint, does not need OT. Walking now and mentally doing good.      Right ankle triple arthrodesis Oct 2020, learning how to walk properly. \"much better\".  Doing more more active.     Fosamax 70 mg every 7 days restarted 9-2020 per Dr. Maribell Domínguez, was on with PCP  In past \"not concerned on for too long\" will need future time off of this was off for 18 month. Prednisone 5 mg once day chronically, hydroxychloroquine (plaquenil) 200 mg once day -eye exam in Dec 2020 early catarct          9/9/2021: Ms. Michel is doing fairly well with no major complaints or RA flare ups on  mg qd+predniosne 5 mg every day. Eye exam is updated. Fully vaccinated. PCP told her to hold fosamax till further discussion in Oct.      Has pain over palms. This gets worse throughout the day. No AM stiffness. Does more chores around the house which is a trigger.     1st covid vaccine caused body ache, second dose caused fatigue.    Today 3/9/2022:    No joint swelling, has edema due HF, joints hurt, warm shower helps, lost strength, has stiffness all day.    Eye exam is due 1/2023    Off pred since 3/3 .      PMH:  Injury-left thumb amputation, left finger broke  Medical-  Antiplatelet or antithrombotic long-term use  Arrhythmia  Atrial tachycardia, flutter  Paroxysmal atrial " "fibrillation  Arthritis  Lymphoma  Cardiac arrest  history of chemotherapy  Primary pulmonary hypertension  Neuropathy  Postoperative nausea and vomiting  Rheumatoid arthritis  Hyperlipidemia LDL goal <130  Lymphedema of both lower extremities  Cardiogenic shock and cardiac arrest 5/2017  Acute respiratory failure with hypoxia  Critical illness myopathy  Sepsis  Wound of left upper extremity  Diffuse large B-cell lymphoma of extranodal site  Febrile neutropenia  Physical deconditioning  Palpitations  Osteoporosis  -DEXA 2018   Slowly healing wound in medial left antecubital fossa after traumatic IV  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, latter felt from lymphoma   Neuropathy of right foot with weakness after intrathecal cytarabine  perioditis   Right arm PICC blood clots during cancer treatments  Right shoulder effusion when had pericarditis       Past Surgical History:  Anesthesia cardioversion  Right wrist arthrodesis, partial fusion \"history migrated out\"  Bone marrow aspirate & biopsy  Excise lesion upper extremity - left wound excision and closure, possible submuscular transposition of ulnar nerve  Foot surgery - 4 left, 2 right  Carpal tunnel bilateral release  Repair ventricular septal defect  Transpositional ulnar nerve (elbow) left   Right shoulder effusion history     Family History:   No autoimmune disorders, psoriasis, UC, crohn's, SLE, RA, PsA, gout, autoimmune thyroid.  No MS, heart disease in family  Mother - breast cancer, hypertension, alzheimer disease-passed   Father - hypertension, lymphoma, circulatory A fib-passed  Paternal grandmother - diabetes  Siblings-younger sister heatlhy PB, younger brother think arthritis not dx PB     Social History:   No smoking or tobacco use. No alcohol use. No IVDU. None Children. Right handed. . Disability          ROS:    A comprehensive ROS was done. Positives are per HPI.      Ph. E:    No exam    Labs/Imaging:  Eye " Exam (10/23/18)  Significant for mild H-lated nuclear cataracts.   Ocular CT was recommended for right eye.     CT Chest/Abdomen/Pelvis (9/26/18)  In this patient with a history of lymphoma:  1. No evidence of disease recurrence, consistent with complete  response per Lugano criteria.  2. Stable cardiomegaly. Improved pericardial effusion.  3. Early contrast phase with heterogeneous visceral enhancement in the  abdomen, likely secondary to cardiac dysfunction.    MRI Cardiac w/contrast (4/12/18)  Loculated exudative pericardial effusion that is beginning to organize, with diffuse  pericardial enhancement consistent with ongoing inflammation. Normal LV fxn, LVEF 54% and RVEF 49%. At  least moderate, possibly severe tricuspid regurgitation. Compared to MRI from 03/2018, effusion is largely  unchanged in size (maybe a bit smaller) but is starting to organize. No change in pericardial enhancement.    Laboratory:   Component      Latest Ref Rng & Units 3/25/2021 5/26/2021 6/23/2021 7/21/2021   WBC      4.0 - 11.0 thou/uL 5.6      RBC Count      3.80 - 5.40 mill/uL 4.31      Hemoglobin      12.0 - 16.0 g/dL 15.4      Hematocrit      35.0 - 47.0 % 44.6      MCV      80 - 100 fL 104 (H)      MCH      27.0 - 34.0 pg 35.7 (H)      MCHC      32.0 - 36.0 g/dL 34.5      RDW      11.0 - 14.5 % 14.4      Platelet Count      140 - 440 thou/uL 126 (L)      Mean Platelet Volume      7.0 - 10.0 fL 9.5      % Neutrophils      50 - 70 % 80 (H)      % Lymphocytes      20 - 40 % 10 (L)      % Monocytes      2 - 10 % 9      % Eosinophils      0 - 6 % 1      % Basophils      0 - 2 % 0      % Immature Granulocytes      <=0 % 0      Absolute Neutrophils      2.0 - 7.7 thou/uL 4.5      Absolute Lymphocytes      0.8 - 4.4 thou/uL 0.6 (L)      Absolute Monocytes      0.0 - 0.9 thou/uL 0.5      Eosinophils Absolute      0.0 - 0.4 thou/uL 0.0      Absolute Basophils      0.0 - 0.2 thou/uL 0.0      Absolute Immature Granulocytes      <=0.0 thou/uL  0.0      Sodium      133 - 144 mmol/L  136 136 137   Potassium      3.4 - 5.3 mmol/L  3.6 4.2 3.9   Chloride      94 - 109 mmol/L  101 102 103   Carbon Dioxide      20 - 32 mmol/L  24 28 28   Anion Gap      3 - 14 mmol/L  11 6 6   Glucose      70 - 99 mg/dL  100 (H) 98 110 (H)   Urea Nitrogen      7 - 30 mg/dL  21 25 29   Creatinine      0.52 - 1.25 mg/dL  1.00 1.05 (H) 1.02   GFR Estimate      >60 mL/min/1.73m2  61 57 (L) 60 (L)   GFR Estimate If Black      >60 mL/min/1.73:m2  71 67    Calcium      8.5 - 10.1 mg/dL  9.3 10.0 9.6   Bilirubin Direct      0.0 - 0.2 mg/dL   0.5 (H)    Bilirubin Total      0.2 - 1.3 mg/dL   1.2    Albumin      3.4 - 5.0 g/dL   4.2    Protein Total      6.8 - 8.8 g/dL   7.5    Alkaline Phosphatase      40 - 150 U/L   136    ALT      0 - 50 U/L   41    AST      0 - 45 U/L   36    N-Terminal Pro Bnp      0 - 125 pg/mL  1,274 (H)     Hemoglobin A1C      0 - 5.6 %  6.2 (H)     Immunoglobulin G      700-1,700 mg/dL 1,179          Impression/Plan:  1. Seropositive rheumatoid arthritis (, RF 31) with multiple joint disability including MTP fusion 1-5 bilaterally, partial right wrist fusion, subluxation and ulnar deformity of MCP's. Rheumatoid arthritis (RA) controlled.  Will rseume prednisone at 3 mg every day (more pain, stiffness off prednisone), and hydroxychloroquine (plaquenil) 200 mg once a day.     2. Long-term hydroxychloroquine (plaquenil) use since 6-2017,  no toxicity, reviewed AAO guidelines, repeat every year.    3. Diffuse large B-cell lymphoma status-post 1 cycle R-EPOCH, 6 cycles R-CHOP; Neuropathy of lower extremities attributed to lymphoma improved on gabapentin 200 mg TID-tolerating. Loculated pericardial effusion, etiology unclear (lymphoma). No symptoms now.     per oncology for surveillance Seeing next week    4. Osteoporosis, chronic long-term corticosteroid use with cushingoid appearance. Received 6-7 years of alendronate in early 2000s. Restarted  alendronate in 1/2019, off 3-2020 to 9-2020 then restarted 9-2020. DEXA  T-2.6 thoracic. If > 5 yr higher risk atypical Fx. Off fosamax at last visit given improvement of bone density in 3/2021 but on chronic steroid tx, did refer to endocrinology to manage steroid induced osteoporosis and endocrinology resumed it in 11/2021. Calcium and GFR normal  .    5. Discussed covid booster vaccine      Plan:    Pfizer booster today    Labs on 4/20    Resume prednisone at 3 mg a day    Return in 6 months (in person)      Maribell Domínguez MD      Orders Placed This Encounter   Procedures     CRP inflammation     Erythrocyte sedimentation rate auto     ALT     Albumin level     AST     Creatinine     Nursing Instructions for Covid-19 Vaccine     Vital Signs for Hypersensitivity Reactions     Cold pack (aka APPLY PACK)  PRN     Notify Provider     CBC with Platelets & Differential

## 2022-03-10 NOTE — NURSING NOTE
Chief Complaint   Patient presents with     RECHECK       BP 92/69   Pulse 88   Temp 98  F (36.7  C) (Oral)   Wt 53.8 kg (118 lb 8 oz)   SpO2 100%   BMI 25.22 kg/m      Brett Molina MA on 3/10/2022 at 11:12 AM

## 2022-03-10 NOTE — PATIENT INSTRUCTIONS
Pfizer booster today    Labs on 4/20    Resume prednisone at 3 mg a day    Return in 6 months (in person)

## 2022-03-16 NOTE — TELEPHONE ENCOUNTER
Central Prior Authorization Team   Phone: [phone]      PA Initiation    Medication: gabapentin (NEURONTIN) 100 MG capsule-Initiated  Insurance Company: [employer] - Phone [phone] Fax [phone]  Pharmacy Filling the Rx: [id number] [place], [place] - [address] AT [facility] [address]  Filling Pharmacy Phone: [phone]  Filling Pharmacy Fax:    Start Date: 3/16/2022

## 2022-03-16 NOTE — TELEPHONE ENCOUNTER
Prior Authorization Approval    Authorization Effective Date: 2/14/2022  Authorization Expiration Date: 3/16/2023  Medication: gabapentin (NEURONTIN) 100 MG capsule-APPROVED  Approved Dose/Quantity:   Reference #:     Insurance Company: [employer] - Phone [phone] Fax [phone]  Expected CoPay:       CoPay Card Available:      Foundation Assistance Needed:    Which Pharmacy is filling the prescription (Not needed for infusion/clinic administered): [id number] [place], [place] - [address] AT [facility] [address]  Pharmacy Notified: Yes  Patient Notified: No    **Instructed pharmacy to notify patient when script is ready to /ship.**

## 2022-03-31 NOTE — NURSING NOTE
Chief Complaint   Patient presents with     Consult     UDS       Maria Luisa Silva MA   Detail Level: Generalized Detail Level: Detailed Detail Level: Simple

## 2022-04-18 DIAGNOSIS — I50.30 HEART FAILURE WITH PRESERVED EJECTION FRACTION, NYHA CLASS I (H): Primary | ICD-10-CM

## 2022-04-20 ENCOUNTER — ANCILLARY PROCEDURE (OUTPATIENT)
Dept: CARDIOLOGY | Facility: CLINIC | Age: 62
End: 2022-04-20
Attending: INTERNAL MEDICINE
Payer: COMMERCIAL

## 2022-04-20 ENCOUNTER — OFFICE VISIT (OUTPATIENT)
Dept: CARDIOLOGY | Facility: CLINIC | Age: 62
End: 2022-04-20
Attending: NURSE PRACTITIONER
Payer: COMMERCIAL

## 2022-04-20 ENCOUNTER — LAB (OUTPATIENT)
Dept: LAB | Facility: CLINIC | Age: 62
End: 2022-04-20
Payer: COMMERCIAL

## 2022-04-20 VITALS
DIASTOLIC BLOOD PRESSURE: 76 MMHG | WEIGHT: 114.2 LBS | HEART RATE: 77 BPM | BODY MASS INDEX: 24.3 KG/M2 | OXYGEN SATURATION: 100 % | SYSTOLIC BLOOD PRESSURE: 111 MMHG

## 2022-04-20 DIAGNOSIS — R00.1 BRADYCARDIA: ICD-10-CM

## 2022-04-20 DIAGNOSIS — Z79.52 LONG TERM (CURRENT) USE OF SYSTEMIC STEROIDS: ICD-10-CM

## 2022-04-20 DIAGNOSIS — Z86.74 H/O CARDIAC ARREST: ICD-10-CM

## 2022-04-20 DIAGNOSIS — I50.30 HEART FAILURE WITH PRESERVED EJECTION FRACTION, NYHA CLASS I (H): ICD-10-CM

## 2022-04-20 DIAGNOSIS — M05.79 RHEUMATOID ARTHRITIS INVOLVING MULTIPLE SITES WITH POSITIVE RHEUMATOID FACTOR (H): ICD-10-CM

## 2022-04-20 DIAGNOSIS — Z79.899 LONG-TERM USE OF PLAQUENIL: ICD-10-CM

## 2022-04-20 DIAGNOSIS — I50.22 CHRONIC SYSTOLIC HEART FAILURE (H): ICD-10-CM

## 2022-04-20 LAB
ALBUMIN SERPL-MCNC: 4.4 G/DL (ref 3.4–5)
ALT SERPL W P-5'-P-CCNC: 44 U/L (ref 0–70)
ANION GAP SERPL CALCULATED.3IONS-SCNC: 7 MMOL/L (ref 3–14)
AST SERPL W P-5'-P-CCNC: 38 U/L (ref 0–45)
BASOPHILS # BLD AUTO: 0 10E3/UL (ref 0–0.2)
BASOPHILS NFR BLD AUTO: 0 %
BUN SERPL-MCNC: 46 MG/DL (ref 7–30)
CALCIUM SERPL-MCNC: 11.1 MG/DL (ref 8.5–10.1)
CHLORIDE BLD-SCNC: 93 MMOL/L (ref 94–109)
CO2 SERPL-SCNC: 27 MMOL/L (ref 20–32)
CREAT SERPL-MCNC: 1.33 MG/DL (ref 0.52–1.25)
CRP SERPL-MCNC: 20.7 MG/L (ref 0–8)
DIGOXIN SERPL-MCNC: 1.6 UG/L
EOSINOPHIL # BLD AUTO: 0 10E3/UL (ref 0–0.7)
EOSINOPHIL NFR BLD AUTO: 0 %
ERYTHROCYTE [DISTWIDTH] IN BLOOD BY AUTOMATED COUNT: 13.2 % (ref 10–15)
ERYTHROCYTE [SEDIMENTATION RATE] IN BLOOD BY WESTERGREN METHOD: 15 MM/HR (ref 0–30)
GFR SERPL CREATININE-BSD FRML MDRD: 45 ML/MIN/1.73M2
GLUCOSE BLD-MCNC: 98 MG/DL (ref 70–99)
HCT VFR BLD AUTO: 39.4 % (ref 35–53)
HGB BLD-MCNC: 13.2 G/DL (ref 11.7–17.7)
IMM GRANULOCYTES # BLD: 0 10E3/UL
IMM GRANULOCYTES NFR BLD: 0 %
LYMPHOCYTES # BLD AUTO: 0.6 10E3/UL (ref 0.8–5.3)
LYMPHOCYTES NFR BLD AUTO: 9 %
MCH RBC QN AUTO: 34.2 PG (ref 26.5–33)
MCHC RBC AUTO-ENTMCNC: 33.5 G/DL (ref 31.5–36.5)
MCV RBC AUTO: 102 FL (ref 78–100)
MONOCYTES # BLD AUTO: 0.6 10E3/UL (ref 0–1.3)
MONOCYTES NFR BLD AUTO: 9 %
NEUTROPHILS # BLD AUTO: 5.3 10E3/UL (ref 1.6–8.3)
NEUTROPHILS NFR BLD AUTO: 82 %
NRBC # BLD AUTO: 0 10E3/UL
NRBC BLD AUTO-RTO: 0 /100
PLATELET # BLD AUTO: 116 10E3/UL (ref 150–450)
POTASSIUM BLD-SCNC: 5.3 MMOL/L (ref 3.4–5.3)
RBC # BLD AUTO: 3.86 10E6/UL (ref 3.8–5.9)
SODIUM SERPL-SCNC: 127 MMOL/L (ref 133–144)
WBC # BLD AUTO: 6.4 10E3/UL (ref 4–11)

## 2022-04-20 PROCEDURE — 99214 OFFICE O/P EST MOD 30 MIN: CPT | Mod: 25 | Performed by: NURSE PRACTITIONER

## 2022-04-20 PROCEDURE — 85025 COMPLETE CBC W/AUTO DIFF WBC: CPT | Performed by: PATHOLOGY

## 2022-04-20 PROCEDURE — 93280 PM DEVICE PROGR EVAL DUAL: CPT | Performed by: INTERNAL MEDICINE

## 2022-04-20 PROCEDURE — 36415 COLL VENOUS BLD VENIPUNCTURE: CPT | Performed by: PATHOLOGY

## 2022-04-20 PROCEDURE — 84460 ALANINE AMINO (ALT) (SGPT): CPT | Performed by: PATHOLOGY

## 2022-04-20 PROCEDURE — 85652 RBC SED RATE AUTOMATED: CPT | Performed by: PATHOLOGY

## 2022-04-20 PROCEDURE — 84450 TRANSFERASE (AST) (SGOT): CPT | Performed by: PATHOLOGY

## 2022-04-20 PROCEDURE — 86140 C-REACTIVE PROTEIN: CPT | Performed by: PATHOLOGY

## 2022-04-20 PROCEDURE — 80162 ASSAY OF DIGOXIN TOTAL: CPT | Performed by: INTERNAL MEDICINE

## 2022-04-20 PROCEDURE — 80048 BASIC METABOLIC PNL TOTAL CA: CPT | Performed by: PATHOLOGY

## 2022-04-20 PROCEDURE — G0463 HOSPITAL OUTPT CLINIC VISIT: HCPCS

## 2022-04-20 PROCEDURE — 82040 ASSAY OF SERUM ALBUMIN: CPT | Performed by: PATHOLOGY

## 2022-04-20 ASSESSMENT — ENCOUNTER SYMPTOMS
HYPERTENSION: 0
HEMOPTYSIS: 0
ARTHRALGIAS: 1
EXERCISE INTOLERANCE: 1
SLEEP DISTURBANCES DUE TO BREATHING: 0
SINUS CONGESTION: 1
HYPOTENSION: 0
MUSCLE CRAMPS: 1
NECK PAIN: 0
PARALYSIS: 0
SHORTNESS OF BREATH: 0
POSTURAL DYSPNEA: 0
HOARSE VOICE: 0
SPEECH CHANGE: 1
SMELL DISTURBANCE: 0
ORTHOPNEA: 0
JOINT SWELLING: 1
FLANK PAIN: 0
DYSURIA: 0
SYNCOPE: 0
COUGH DISTURBING SLEEP: 0
HEADACHES: 0
DIFFICULTY URINATING: 0
DISTURBANCES IN COORDINATION: 1
STIFFNESS: 1
MEMORY LOSS: 0
SNORES LOUDLY: 0
BACK PAIN: 1
COUGH: 0
WHEEZING: 0
TREMORS: 1
MYALGIAS: 0
TINGLING: 1
NUMBNESS: 1
LIGHT-HEADEDNESS: 0
MUSCLE WEAKNESS: 1
SPUTUM PRODUCTION: 0
DYSPNEA ON EXERTION: 0
LOSS OF CONSCIOUSNESS: 0
SEIZURES: 0
NECK MASS: 0
WEAKNESS: 1
DIZZINESS: 1
SORE THROAT: 0
TROUBLE SWALLOWING: 0
SINUS PAIN: 1
TASTE DISTURBANCE: 0
LEG PAIN: 1
PALPITATIONS: 1
HEMATURIA: 0

## 2022-04-20 ASSESSMENT — PAIN SCALES - GENERAL: PAINLEVEL: NO PAIN (0)

## 2022-04-20 NOTE — NURSING NOTE
Chief Complaint   Patient presents with     Follow Up     CORE Return- Return CORE ; 61 year old with chronic diastolic heart failure presents for follow up with labs prior     Vitals were taken and medications reconciled.    Nickolas Hardy, SHAWN  4:02 PM

## 2022-04-20 NOTE — NURSING NOTE
Diet: Patient instructed regarding a heart failure healthy diet, including discussion of reduced fat and 2000 mg daily sodium restriction, daily weights, medication purpose and compliance, fluid restrictions and resources for patient and family to access for assistance with heart failure management.       Labs: Patient was given results of the laboratory testing obtained today and patient was instructed about when to return for the next laboratory testing. Repeat BMp in 1 week.    Med Reconcile: Reviewed and verified all current medications with the patient. The updated medication list was printed and given to the patient. NO CHANGEs.     Return Appointment: Patient given instructions regarding scheduling next clinic visit. RTC to see CORE 6 months after dr hernandez in August.     Patient stated she understood all health information given and agreed to call with further questions or concerns.     Nancy Mohamud RN

## 2022-04-20 NOTE — PROGRESS NOTES
HPI:   Amelia Michel is a 61 year old adult with a past medical history including VSD s/p repair 1969, RA, HTN, Insomnia, Atrial Tachyarrhythmias, cardiac arrest, SSS s/p PPM 12/19, DLBCL, and exudative pericardial effusion. She presents for routine CORE return. SHe was last seen by Halle Vasquez NP in October.      Her cardiac history dates back to 5/17 with atrial tachyarrhythmias with LVEF 40-45%. She underwent repeat hospitalization 6/17 due to VF arrest in setting of normal angiogram and found to have DLBCL complicated by masses on the coronary cusps and LVOT. She underwent R-EPOCH one cycle and 5 subsequent cycles of R-CHOP completed in 12/17 with her course complicated by pericardial effusion treated with colcichine. She has struggled with recurrent arrhythmia issues. Prior Cardiac MRI from 6788-8837 with normal EF. Since our last appointment she has undergone repeat Echo consistent with findings of HFPEF with EF 50% with adequately controlled HR, persistent moderate to severe TR.     She states she has been feeling well except for her constant fatigue. Pt denies SOB, orthopnea, PND, chest pain, belly bloating, loss of appetite, LE edema. Pt states energy level is fair. She has lost 20# intentionally after having her prednisone dose decreased, which she is happy about.  Her home BP's have ranged from /60-70.     PAST MEDICAL HISTORY:  Past Medical History:   Diagnosis Date     Arthritis      Atrial tachycardia (H)      Cardiac arrest (H)      Cardiac arrest (H)      Critical illness myopathy      Diffuse large B-cell lymphoma (H)      Embolism and thrombosis of unspecified artery (H)      History of blood clots 2017    PICC line Right arm      History of chemotherapy      History of transfusion      Hx antineoplastic chemotherapy     lymphoma     Hypertension      Hypertension      Lymphedema of both lower extremities      Neuropathy      Physical deconditioning      Positive PPD, 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      Thrombocytopenia (H)      VSD (ventricular septal defect)        FAMILY HISTORY:  Family History   Problem Relation Age of Onset     Breast Cancer Mother      Hypertension Mother      Alzheimer Disease Mother      Cerebrovascular Disease Mother      Hypertension Father      Cerebrovascular Disease Father      Atrial fibrillation Father      Lymphoma Father      Prostate Cancer Father      Diabetes Paternal Grandmother      Alzheimer Disease Maternal Grandmother         likely     Arthritis Maternal Grandfather      Pneumonia Maternal Grandfather        SOCIAL HISTORY:  Social History     Socioeconomic History     Marital status:      Spouse name: Romeo Michel     Number of children: 0     Years of education: None     Highest education level: None   Occupational History     Employer: [employer]   Tobacco Use     Smoking status: Never Smoker     Smokeless tobacco: Never Used   Substance and Sexual Activity     Alcohol use: Yes     Comment: none     Drug use: No     Sexual activity: None   Other Topics Concern     Parent/sibling w/ CABG, MI or angioplasty before 65F 55M? No   Social History Narrative     None     Social Determinants of Health     Financial Resource Strain:      Difficulty of Paying Living Expenses:    Food Insecurity:      Worried About Running Out of Food in the Last Year:      Ran Out of Food in the Last Year:    Transportation Needs:      Lack of Transportation (Medical):      Lack of Transportation (Non-Medical):    Physical Activity:      Days of Exercise per Week:      Minutes of Exercise per Session:    Stress:      Feeling of Stress :    Social Connections:      Frequency of Communication with Friends and Family:      Frequency of Social Gatherings with Friends and Family:      Attends Muslim Services:      Active Member of Clubs or Organizations:      Attends Club or Organization Meetings:      Marital Status:    Intimate Partner Violence:      Fear of Current  or Ex-Partner:      Emotionally Abused:      Physically Abused:      Sexually Abused:        CURRENT MEDICATIONS:  Outpatient Medications Prior to Visit   Medication Sig Dispense Refill     acetaminophen (TYLENOL) 500 MG tablet Take 500 mg by mouth every 6 hours as needed for mild pain        alendronate (FOSAMAX) 70 MG tablet Take 1 tablet (70 mg) by mouth every 7 days 12 tablet 3     apixaban ANTICOAGULANT (ELIQUIS ANTICOAGULANT) 5 MG tablet Take 1 tablet (5 mg) by mouth 2 times daily 180 tablet 3     atenolol (TENORMIN) 25 MG tablet 50 mg in AM and 25 mg in the evening 270 tablet 4     calcium carbonate 600 mg-vitamin D 400 units (CALTRATE) 600-400 MG-UNIT per tablet Take 2 tablets by mouth daily       digoxin (LANOXIN) 125 MCG tablet Take 1 tablet (125 mcg) by mouth daily 90 tablet 3     furosemide (LASIX) 20 MG tablet Take 1 tablet (20 mg) by mouth daily 90 tablet 3     gabapentin (NEURONTIN) 100 MG capsule TAKE 2 CAPSULES(200 MG) BY MOUTH THREE TIMES DAILY (Patient taking differently: 100 mg 4 times daily) 540 capsule 1     hydroxychloroquine (PLAQUENIL) 200 MG tablet Take 1 tablet (200 mg) by mouth daily 90 tablet 3     lisinopril (ZESTRIL) 2.5 MG tablet Take 1 tablet (2.5 mg) by mouth daily 90 tablet 3     multivitamin, therapeutic with minerals (THERA-VIT-M) TABS tablet Take 1 tablet by mouth daily 30 each 0     predniSONE (DELTASONE) 1 MG tablet Take 3 tablets (3 mg) by mouth daily 270 tablet 3     spironolactone (ALDACTONE) 25 MG tablet Take 2 tablets (50 mg) by mouth daily 180 tablet 3     STATIN NOT PRESCRIBED (INTENTIONAL) Please choose reason not prescribed, below (Patient not taking: Reported on 4/20/2022)       No facility-administered medications prior to visit.       ROS:   CONSTITUTIONAL: Denies fever, chills, fatigue, or weight fluctuations.   HEENT: Denies headache, vision changes, and changes in speech.   CV: Refer to HPI.   PULMONARY:Denies shortness of breath, cough, or previous TB exposure.    GI:Denies nausea, vomiting, diarrhea, and abdominal pain. Bowel movements are regular.   :Denies urinary alterations, dysuria, urinary frequency, hematuria, and abnormal drainage.   EXT:Denies lower extremity edema.   SKIN:Denies abnormal rashes or lesions.   MUSCULOSKELETAL:Denies upper or lower extremity weakness and pain.   NEUROLOGIC:Denies lightheadedness, dizziness, seizures, or upper or lower extremity paresthesia.     EXAM:  /76 (BP Location: Right arm, Patient Position: Chair, Cuff Size: Adult Regular)   Pulse 77   Wt 51.8 kg (114 lb 3.2 oz)   SpO2 100%   BMI 24.30 kg/m    GENERAL: Appears alert and oriented times three.   HEENT: Eye symmetrical and free of discharge bilaterally. Mucous membranes moist and without lesions.  NECK: Supple and without lymphadenopathy. JVD at clavicular line.   CV: RRR, S1S2 with 3/6 systolic murmur    RESPIRATORY: Respirations regular, even, and unlabored. Lungs CTA throughout.   GI: Soft and non distended with normoactive bowel sounds present in all quadrants. No tenderness, rebound, guarding. No organomegaly.   EXTREMITIES: No peripheral edema. 2+ bilateral pedal pulses.   NEUROLOGIC: Alert and orientated x 3. CN II-XII grossly intact. No focal deficits.   MUSCULOSKELETAL:Notable RA joint changes   SKIN: No jaundice. No rashes or lesions.     The following Labs were reviewed today:  CBC RESULTS:  Lab Results   Component Value Date    WBC 6.4 04/20/2022    WBC Canceled, Test credited 03/16/2021    RBC 3.86 04/20/2022    RBC Canceled, Test credited 03/16/2021    HGB 13.2 04/20/2022    HGB Canceled, Test credited 03/16/2021    HCT 39.4 04/20/2022    HCT Canceled, Test credited 03/16/2021     (H) 04/20/2022    MCV Canceled, Test credited 03/16/2021    MCH 34.2 (H) 04/20/2022    MCH Canceled, Test credited 03/16/2021    MCHC 33.5 04/20/2022    MCHC Canceled, Test credited 03/16/2021    RDW 13.2 04/20/2022    RDW Canceled, Test credited 03/16/2021      (L) 04/20/2022    PLT Canceled, Test credited 03/16/2021       CMP RESULTS:  Lab Results   Component Value Date     (L) 04/20/2022     06/23/2021    POTASSIUM 5.3 04/20/2022    POTASSIUM 4.2 06/23/2021    CHLORIDE 93 (L) 04/20/2022    CHLORIDE 102 06/23/2021    CO2 27 04/20/2022    CO2 28 06/23/2021    ANIONGAP 7 04/20/2022    ANIONGAP 6 06/23/2021    GLC 98 04/20/2022    GLC 98 06/23/2021    BUN 46 (H) 04/20/2022    BUN 25 06/23/2021    CR 1.33 (H) 04/20/2022    CR 1.05 (H) 06/23/2021    GFRESTIMATED 45 (L) 04/20/2022    GFRESTIMATED 57 (L) 06/23/2021    GFRESTBLACK 67 06/23/2021    VIKTORIYA 11.1 (H) 04/20/2022    VIKTORIYA 10.0 06/23/2021    BILITOTAL 1.3 09/16/2021    BILITOTAL 1.2 06/23/2021    ALBUMIN 4.4 04/20/2022    ALBUMIN 4.2 06/23/2021    ALKPHOS 133 09/16/2021    ALKPHOS 136 06/23/2021    ALT 44 04/20/2022    ALT 41 06/23/2021    AST 38 04/20/2022    AST 36 06/23/2021        INR RESULTS:  Lab Results   Component Value Date    INR 3.42 (H) 03/07/2018       Lab Results   Component Value Date    MAG 1.9 04/12/2018     Lab Results   Component Value Date    NTBNPI 485 12/12/2019     Lab Results   Component Value Date    NTBNP 1,425 (H) 10/13/2021    NTBNP 1,274 (H) 05/26/2021         Echo 8/25/21:   Interpretation Summary  H/O of VSD repair in 1969  The visual ejection fraction is 60-65%. No wall motion abnormalities. Global  right ventricular function is mildly reduced.  Moderate tricuspid insufficiency is present.  There is a right ventricular right atrial pressure difference of 24mmHg.  IVC diameter >2.1 cm collapsing <50% with sniff suggests a high RA pressure  estimated at 15 mmHg or greater.  No pericardial effusion is present.     Assessment and Plan:   HFpEF   NYHA Class III, AHA/ACC Stage C  Rate control: 85  volume status: Euvolemic  Blood pressure control: Controlled  Aldosterone antagonist: Aldactone 25 mg po daily,     HTN.  Well controlled on current regime  (Intolerant to Metoprolol in the  past).     Aflutter. History of SSS s/p PPM 12/19. Long standing history of atrial arrythmias.  -Rate controlled - on Dig (level pending); atenolol and Eliquis for A/C   VSD s/p repair.      Valvular heart disease;  Moderate to severe TR. Stable per follow up Echo 8/25/21.   - Continue to monitor symptoms.      Hyponatremia: Unclear etiology: will recheck labs in one week.    Follow up in August with Dr. Benavides, CORE in 6 months after such.            CC

## 2022-04-20 NOTE — LETTER
4/20/2022      RE: Amelia Michel  [address]  [place] [address]       Dear Colleague,    Thank you for the opportunity to participate in the care of your patient, Amelia Michel, at the [facility] [place] at [facility]. Please see a copy of my visit note below.    HPI:   Amelia Michel is a 61 year old adult with a past medical history including VSD s/p repair 1969, RA, HTN, Insomnia, Atrial Tachyarrhythmias, cardiac arrest, SSS s/p PPM 12/19, DLBCL, and exudative pericardial effusion. She presents for routine CORE return. SHe was last seen by Halle Vasquez NP in October.      Her cardiac history dates back to 5/17 with atrial tachyarrhythmias with LVEF 40-45%. She underwent repeat hospitalization 6/17 due to VF arrest in setting of normal angiogram and found to have DLBCL complicated by masses on the coronary cusps and LVOT. She underwent R-EPOCH one cycle and 5 subsequent cycles of R-CHOP completed in 12/17 with her course complicated by pericardial effusion treated with colcichine. She has struggled with recurrent arrhythmia issues. Prior Cardiac MRI from 5412-1363 with normal EF. Since our last appointment she has undergone repeat Echo consistent with findings of HFPEF with EF 50% with adequately controlled HR, persistent moderate to severe TR.     She states she has been feeling well except for her constant fatigue. Pt denies SOB, orthopnea, PND, chest pain, belly bloating, loss of appetite, LE edema. Pt states energy level is fair. She has lost 20# intentionally after having her prednisone dose decreased, which she is happy about.  Her home BP's have ranged from /60-70.     PAST MEDICAL HISTORY:  Past Medical History:   Diagnosis Date     Arthritis      Atrial tachycardia (H)      Cardiac arrest (H)      Cardiac arrest (H)      Critical illness myopathy      Diffuse large B-cell lymphoma (H)      Embolism and thrombosis of  unspecified artery (H)      History of blood clots 2017    PICC line Right arm      History of chemotherapy      History of transfusion      Hx antineoplastic chemotherapy     lymphoma     Hypertension      Hypertension      Lymphedema of both lower extremities      Neuropathy      Physical deconditioning      Positive PPD, 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     Thrombocytopenia (H)      VSD (ventricular septal defect)        FAMILY HISTORY:  Family History   Problem Relation Age of Onset     Breast Cancer Mother      Hypertension Mother      Alzheimer Disease Mother      Cerebrovascular Disease Mother      Hypertension Father      Cerebrovascular Disease Father      Atrial fibrillation Father      Lymphoma Father      Prostate Cancer Father      Diabetes Paternal Grandmother      Alzheimer Disease Maternal Grandmother         likely     Arthritis Maternal Grandfather      Pneumonia Maternal Grandfather        SOCIAL HISTORY:  Social History     Socioeconomic History     Marital status:      Spouse name: Romeo Michel     Number of children: 0     Years of education: None     Highest education level: None   Occupational History     Employer: [employer]   Tobacco Use     Smoking status: Never Smoker     Smokeless tobacco: Never Used   Substance and Sexual Activity     Alcohol use: Yes     Comment: none     Drug use: No     Sexual activity: None   Other Topics Concern     Parent/sibling w/ CABG, MI or angioplasty before 65F 55M? No   Social History Narrative     None     Social Determinants of Health     Financial Resource Strain:      Difficulty of Paying Living Expenses:    Food Insecurity:      Worried About Running Out of Food in the Last Year:      Ran Out of Food in the Last Year:    Transportation Needs:      Lack of Transportation (Medical):      Lack of Transportation (Non-Medical):    Physical Activity:      Days of Exercise per Week:      Minutes of Exercise per Session:    Stress:      Feeling of  Stress :    Social Connections:      Frequency of Communication with Friends and Family:      Frequency of Social Gatherings with Friends and Family:      Attends Mormon Services:      Active Member of Clubs or Organizations:      Attends Club or Organization Meetings:      Marital Status:    Intimate Partner Violence:      Fear of Current or Ex-Partner:      Emotionally Abused:      Physically Abused:      Sexually Abused:        CURRENT MEDICATIONS:  Outpatient Medications Prior to Visit   Medication Sig Dispense Refill     acetaminophen (TYLENOL) 500 MG tablet Take 500 mg by mouth every 6 hours as needed for mild pain        alendronate (FOSAMAX) 70 MG tablet Take 1 tablet (70 mg) by mouth every 7 days 12 tablet 3     apixaban ANTICOAGULANT (ELIQUIS ANTICOAGULANT) 5 MG tablet Take 1 tablet (5 mg) by mouth 2 times daily 180 tablet 3     atenolol (TENORMIN) 25 MG tablet 50 mg in AM and 25 mg in the evening 270 tablet 4     calcium carbonate 600 mg-vitamin D 400 units (CALTRATE) 600-400 MG-UNIT per tablet Take 2 tablets by mouth daily       digoxin (LANOXIN) 125 MCG tablet Take 1 tablet (125 mcg) by mouth daily 90 tablet 3     furosemide (LASIX) 20 MG tablet Take 1 tablet (20 mg) by mouth daily 90 tablet 3     gabapentin (NEURONTIN) 100 MG capsule TAKE 2 CAPSULES(200 MG) BY MOUTH THREE TIMES DAILY (Patient taking differently: 100 mg 4 times daily) 540 capsule 1     hydroxychloroquine (PLAQUENIL) 200 MG tablet Take 1 tablet (200 mg) by mouth daily 90 tablet 3     lisinopril (ZESTRIL) 2.5 MG tablet Take 1 tablet (2.5 mg) by mouth daily 90 tablet 3     multivitamin, therapeutic with minerals (THERA-VIT-M) TABS tablet Take 1 tablet by mouth daily 30 each 0     predniSONE (DELTASONE) 1 MG tablet Take 3 tablets (3 mg) by mouth daily 270 tablet 3     spironolactone (ALDACTONE) 25 MG tablet Take 2 tablets (50 mg) by mouth daily 180 tablet 3     STATIN NOT PRESCRIBED (INTENTIONAL) Please choose reason not prescribed,  below (Patient not taking: Reported on 4/20/2022)       No facility-administered medications prior to visit.       ROS:   CONSTITUTIONAL: Denies fever, chills, fatigue, or weight fluctuations.   HEENT: Denies headache, vision changes, and changes in speech.   CV: Refer to HPI.   PULMONARY:Denies shortness of breath, cough, or previous TB exposure.   GI:Denies nausea, vomiting, diarrhea, and abdominal pain. Bowel movements are regular.   :Denies urinary alterations, dysuria, urinary frequency, hematuria, and abnormal drainage.   EXT:Denies lower extremity edema.   SKIN:Denies abnormal rashes or lesions.   MUSCULOSKELETAL:Denies upper or lower extremity weakness and pain.   NEUROLOGIC:Denies lightheadedness, dizziness, seizures, or upper or lower extremity paresthesia.     EXAM:  /76 (BP Location: Right arm, Patient Position: Chair, Cuff Size: Adult Regular)   Pulse 77   Wt 51.8 kg (114 lb 3.2 oz)   SpO2 100%   BMI 24.30 kg/m    GENERAL: Appears alert and oriented times three.   HEENT: Eye symmetrical and free of discharge bilaterally. Mucous membranes moist and without lesions.  NECK: Supple and without lymphadenopathy. JVD at clavicular line.   CV: RRR, S1S2 with 3/6 systolic murmur    RESPIRATORY: Respirations regular, even, and unlabored. Lungs CTA throughout.   GI: Soft and non distended with normoactive bowel sounds present in all quadrants. No tenderness, rebound, guarding. No organomegaly.   EXTREMITIES: No peripheral edema. 2+ bilateral pedal pulses.   NEUROLOGIC: Alert and orientated x 3. CN II-XII grossly intact. No focal deficits.   MUSCULOSKELETAL:Notable RA joint changes   SKIN: No jaundice. No rashes or lesions.     The following Labs were reviewed today:  CBC RESULTS:  Lab Results   Component Value Date    WBC 6.4 04/20/2022    WBC Canceled, Test credited 03/16/2021    RBC 3.86 04/20/2022    RBC Canceled, Test credited 03/16/2021    HGB 13.2 04/20/2022    HGB Canceled, Test credited  03/16/2021    HCT 39.4 04/20/2022    HCT Canceled, Test credited 03/16/2021     (H) 04/20/2022    MCV Canceled, Test credited 03/16/2021    MCH 34.2 (H) 04/20/2022    MCH Canceled, Test credited 03/16/2021    MCHC 33.5 04/20/2022    MCHC Canceled, Test credited 03/16/2021    RDW 13.2 04/20/2022    RDW Canceled, Test credited 03/16/2021     (L) 04/20/2022    PLT Canceled, Test credited 03/16/2021       CMP RESULTS:  Lab Results   Component Value Date     (L) 04/20/2022     06/23/2021    POTASSIUM 5.3 04/20/2022    POTASSIUM 4.2 06/23/2021    CHLORIDE 93 (L) 04/20/2022    CHLORIDE 102 06/23/2021    CO2 27 04/20/2022    CO2 28 06/23/2021    ANIONGAP 7 04/20/2022    ANIONGAP 6 06/23/2021    GLC 98 04/20/2022    GLC 98 06/23/2021    BUN 46 (H) 04/20/2022    BUN 25 06/23/2021    CR 1.33 (H) 04/20/2022    CR 1.05 (H) 06/23/2021    GFRESTIMATED 45 (L) 04/20/2022    GFRESTIMATED 57 (L) 06/23/2021    GFRESTBLACK 67 06/23/2021    VIKTORIYA 11.1 (H) 04/20/2022    VIKTORIYA 10.0 06/23/2021    BILITOTAL 1.3 09/16/2021    BILITOTAL 1.2 06/23/2021    ALBUMIN 4.4 04/20/2022    ALBUMIN 4.2 06/23/2021    ALKPHOS 133 09/16/2021    ALKPHOS 136 06/23/2021    ALT 44 04/20/2022    ALT 41 06/23/2021    AST 38 04/20/2022    AST 36 06/23/2021        INR RESULTS:  Lab Results   Component Value Date    INR 3.42 (H) 03/07/2018       Lab Results   Component Value Date    MAG 1.9 04/12/2018     Lab Results   Component Value Date    NTBNPI 485 12/12/2019     Lab Results   Component Value Date    NTBNP 1,425 (H) 10/13/2021    NTBNP 1,274 (H) 05/26/2021     Echo 8/25/21:   Interpretation Summary  H/O of VSD repair in 1969  The visual ejection fraction is 60-65%. No wall motion abnormalities. Global  right ventricular function is mildly reduced.  Moderate tricuspid insufficiency is present.  There is a right ventricular right atrial pressure difference of 24mmHg.  IVC diameter >2.1 cm collapsing <50% with sniff suggests a high RA  pressure  estimated at 15 mmHg or greater.  No pericardial effusion is present.     Assessment and Plan:   HFpEF   NYHA Class III, AHA/ACC Stage C  Rate control: 85  volume status: Euvolemic  Blood pressure control: Controlled  Aldosterone antagonist: Aldactone 25 mg po daily,     HTN.  Well controlled on current regime  (Intolerant to Metoprolol in the past).     Aflutter. History of SSS s/p PPM 12/19. Long standing history of atrial arrythmias.  -Rate controlled - on Dig (level pending); atenolol and Eliquis for A/C   VSD s/p repair.      Valvular heart disease;  Moderate to severe TR. Stable per follow up Echo 8/25/21.   - Continue to monitor symptoms.      Hyponatremia: Unclear etiology: will recheck labs in one week.    Follow up in August with Dr. Benavides, ELENI in 6 months after such.        Please do not hesitate to contact me if you have any questions/concerns.     Sincerely,     ELIZABETH Forrester CNP

## 2022-04-20 NOTE — PATIENT INSTRUCTIONS
Take your medicines every day, as directed    Changes made today:  No changes  Repeat labs in 1 week   Monitor Your Weight and Symptoms    Contact us if you:    Gain 2 pounds in one day or 5 pounds in one week  Feel more short of breath  Notice more leg swelling  Feel lightheadeded   Change your lifestyle    Limit Salt or Sodium:  2000 mg  Limit Fluids:  2000 mL or approximately 64 ounces  Eat a Heart Healthy Diet  Low in saturated fats  Stay Active:  Aim to move at least 150 minutes every  week         To Contact us    During Business Hours:  [phone], option # 1      After hours, weekends or holidays:   [phone], Option #4  Ask to speak to the On-Call Cardiologist. Inform them you are a CORE/heart failure patient at the [facility].     Use [employer] allows you to communicate directly with your heart team through secure messaging.  [employer] can be accessed any time on your phone, computer, or tablet.  If you need assistance, we'd be happy to help!         Keep your Heart Appointments:    Follow up with CORE clinic in February 2023 (6 months after Dr Eaton appointment in August)

## 2022-04-20 NOTE — PATIENT INSTRUCTIONS
It was a pleasure to see you in clinic today. Please do not hesitate to call with any questions or concerns. We look forward to seeing you in clinic at your next device check in 4 months.    ASHLEIGH DuncanN, RN  Electrophysiology Nurse Clinician  [facility]    During Business Hours Please Call:  [phone]  After Hours Please Call:  [phone] - select option #4 and ask for job code [phone]

## 2022-04-21 LAB
MDC_IDC_EPISODE_DTM: NORMAL
MDC_IDC_EPISODE_DURATION: 10 S
MDC_IDC_EPISODE_DURATION: 26 S
MDC_IDC_EPISODE_DURATION: 30 S
MDC_IDC_EPISODE_DURATION: 33 S
MDC_IDC_EPISODE_ID: 3857
MDC_IDC_EPISODE_ID: 3858
MDC_IDC_EPISODE_ID: 3859
MDC_IDC_EPISODE_ID: 3860
MDC_IDC_EPISODE_TYPE: NORMAL
MDC_IDC_LEAD_IMPLANT_DT: NORMAL
MDC_IDC_LEAD_IMPLANT_DT: NORMAL
MDC_IDC_LEAD_LOCATION: NORMAL
MDC_IDC_LEAD_LOCATION: NORMAL
MDC_IDC_LEAD_LOCATION_DETAIL_1: NORMAL
MDC_IDC_LEAD_LOCATION_DETAIL_1: NORMAL
MDC_IDC_LEAD_MFG: NORMAL
MDC_IDC_LEAD_MFG: NORMAL
MDC_IDC_LEAD_MODEL: NORMAL
MDC_IDC_LEAD_MODEL: NORMAL
MDC_IDC_LEAD_POLARITY_TYPE: NORMAL
MDC_IDC_LEAD_POLARITY_TYPE: NORMAL
MDC_IDC_LEAD_SERIAL: NORMAL
MDC_IDC_LEAD_SERIAL: NORMAL
MDC_IDC_LEAD_SPECIAL_FUNCTION: NORMAL
MDC_IDC_LEAD_SPECIAL_FUNCTION: NORMAL
MDC_IDC_MSMT_BATTERY_DTM: NORMAL
MDC_IDC_MSMT_BATTERY_REMAINING_LONGEVITY: 145 MO
MDC_IDC_MSMT_BATTERY_RRT_TRIGGER: 2.62
MDC_IDC_MSMT_BATTERY_STATUS: NORMAL
MDC_IDC_MSMT_BATTERY_VOLTAGE: 3.01 V
MDC_IDC_MSMT_LEADCHNL_RA_IMPEDANCE_VALUE: 437 OHM
MDC_IDC_MSMT_LEADCHNL_RA_SENSING_INTR_AMPL: 1.8 MV
MDC_IDC_MSMT_LEADCHNL_RV_IMPEDANCE_VALUE: 532 OHM
MDC_IDC_MSMT_LEADCHNL_RV_PACING_THRESHOLD_AMPLITUDE: 0.5 V
MDC_IDC_MSMT_LEADCHNL_RV_PACING_THRESHOLD_PULSEWIDTH: 0.4 MS
MDC_IDC_MSMT_LEADCHNL_RV_SENSING_INTR_AMPL: 7.9 MV
MDC_IDC_PG_IMPLANT_DTM: NORMAL
MDC_IDC_PG_MFG: NORMAL
MDC_IDC_PG_MODEL: NORMAL
MDC_IDC_PG_SERIAL: NORMAL
MDC_IDC_PG_TYPE: NORMAL
MDC_IDC_SESS_CLINIC_NAME: NORMAL
MDC_IDC_SESS_DTM: NORMAL
MDC_IDC_SESS_TYPE: NORMAL
MDC_IDC_SET_BRADY_HYSTRATE: NORMAL
MDC_IDC_SET_BRADY_LOWRATE: 60 {BEATS}/MIN
MDC_IDC_SET_BRADY_MAX_SENSOR_RATE: 130 {BEATS}/MIN
MDC_IDC_SET_BRADY_MODE: NORMAL
MDC_IDC_SET_LEADCHNL_RA_SENSING_ANODE_ELECTRODE_1: NORMAL
MDC_IDC_SET_LEADCHNL_RA_SENSING_ANODE_LOCATION_1: NORMAL
MDC_IDC_SET_LEADCHNL_RA_SENSING_CATHODE_ELECTRODE_1: NORMAL
MDC_IDC_SET_LEADCHNL_RA_SENSING_CATHODE_LOCATION_1: NORMAL
MDC_IDC_SET_LEADCHNL_RA_SENSING_POLARITY: NORMAL
MDC_IDC_SET_LEADCHNL_RA_SENSING_SENSITIVITY: NORMAL
MDC_IDC_SET_LEADCHNL_RV_PACING_AMPLITUDE: 2 V
MDC_IDC_SET_LEADCHNL_RV_PACING_ANODE_ELECTRODE_1: NORMAL
MDC_IDC_SET_LEADCHNL_RV_PACING_ANODE_LOCATION_1: NORMAL
MDC_IDC_SET_LEADCHNL_RV_PACING_CAPTURE_MODE: NORMAL
MDC_IDC_SET_LEADCHNL_RV_PACING_CATHODE_ELECTRODE_1: NORMAL
MDC_IDC_SET_LEADCHNL_RV_PACING_CATHODE_LOCATION_1: NORMAL
MDC_IDC_SET_LEADCHNL_RV_PACING_POLARITY: NORMAL
MDC_IDC_SET_LEADCHNL_RV_PACING_PULSEWIDTH: 0.4 MS
MDC_IDC_SET_LEADCHNL_RV_SENSING_ANODE_ELECTRODE_1: NORMAL
MDC_IDC_SET_LEADCHNL_RV_SENSING_ANODE_LOCATION_1: NORMAL
MDC_IDC_SET_LEADCHNL_RV_SENSING_CATHODE_ELECTRODE_1: NORMAL
MDC_IDC_SET_LEADCHNL_RV_SENSING_CATHODE_LOCATION_1: NORMAL
MDC_IDC_SET_LEADCHNL_RV_SENSING_POLARITY: NORMAL
MDC_IDC_SET_LEADCHNL_RV_SENSING_SENSITIVITY: 0.9 MV
MDC_IDC_SET_ZONE_DETECTION_INTERVAL: 400 MS
MDC_IDC_SET_ZONE_TYPE: NORMAL
MDC_IDC_STAT_AT_BURDEN_PERCENT: 100 %
MDC_IDC_STAT_BRADY_DTM_END: NORMAL
MDC_IDC_STAT_BRADY_DTM_START: NORMAL
MDC_IDC_STAT_BRADY_RA_PERCENT_PACED: 0.09 %
MDC_IDC_STAT_BRADY_RV_PERCENT_PACED: 70.29 %
MDC_IDC_STAT_EPISODE_RECENT_COUNT: 0
MDC_IDC_STAT_EPISODE_RECENT_COUNT: 0
MDC_IDC_STAT_EPISODE_RECENT_COUNT: 4
MDC_IDC_STAT_EPISODE_RECENT_COUNT_DTM_END: NORMAL
MDC_IDC_STAT_EPISODE_RECENT_COUNT_DTM_START: NORMAL
MDC_IDC_STAT_EPISODE_TOTAL_COUNT: 0
MDC_IDC_STAT_EPISODE_TOTAL_COUNT: 3691
MDC_IDC_STAT_EPISODE_TOTAL_COUNT: 7
MDC_IDC_STAT_EPISODE_TOTAL_COUNT_DTM_END: NORMAL
MDC_IDC_STAT_EPISODE_TOTAL_COUNT_DTM_START: NORMAL
MDC_IDC_STAT_EPISODE_TYPE: NORMAL

## 2022-04-27 ENCOUNTER — HOSPITAL ENCOUNTER (EMERGENCY)
Facility: HOSPITAL | Age: 62
Discharge: HOME | End: 2022-04-27
Attending: EMERGENCY MEDICINE | Admitting: EMERGENCY MEDICINE
Payer: COMMERCIAL

## 2022-04-27 ENCOUNTER — OFFICE VISIT (OUTPATIENT)
Dept: FAMILY MEDICINE | Facility: CLINIC | Age: 62
End: 2022-04-27
Payer: COMMERCIAL

## 2022-04-27 ENCOUNTER — TELEPHONE (OUTPATIENT)
Dept: FAMILY MEDICINE | Facility: CLINIC | Age: 62
End: 2022-04-27

## 2022-04-27 VITALS
OXYGEN SATURATION: 99 % | HEIGHT: 57 IN | WEIGHT: 118 LBS | DIASTOLIC BLOOD PRESSURE: 68 MMHG | BODY MASS INDEX: 25.46 KG/M2 | RESPIRATION RATE: 18 BRPM | SYSTOLIC BLOOD PRESSURE: 98 MMHG | HEART RATE: 73 BPM | TEMPERATURE: 98.1 F

## 2022-04-27 VITALS
OXYGEN SATURATION: 96 % | HEART RATE: 85 BPM | TEMPERATURE: 102.3 F | DIASTOLIC BLOOD PRESSURE: 74 MMHG | RESPIRATION RATE: 16 BRPM | WEIGHT: 114.6 LBS | BODY MASS INDEX: 24.39 KG/M2 | SYSTOLIC BLOOD PRESSURE: 115 MMHG

## 2022-04-27 DIAGNOSIS — R05.9 COUGH: Primary | ICD-10-CM

## 2022-04-27 DIAGNOSIS — B34.9 VIRAL ILLNESS: ICD-10-CM

## 2022-04-27 DIAGNOSIS — E87.1 HYPONATREMIA: ICD-10-CM

## 2022-04-27 DIAGNOSIS — E86.0 DEHYDRATION: ICD-10-CM

## 2022-04-27 DIAGNOSIS — N17.9 ACUTE KIDNEY INJURY (H): ICD-10-CM

## 2022-04-27 LAB
ALBUMIN UR-MCNC: 10 MG/DL
ANION GAP SERPL CALCULATED.3IONS-SCNC: 12 MMOL/L (ref 5–18)
APPEARANCE UR: CLEAR
BASOPHILS # BLD AUTO: 0 10E3/UL (ref 0–0.2)
BASOPHILS NFR BLD AUTO: 1 %
BILIRUB UR QL STRIP: NEGATIVE
BNP SERPL-MCNC: 681 PG/ML (ref 0–96)
BUN SERPL-MCNC: 25 MG/DL (ref 8–22)
CALCIUM SERPL-MCNC: 9 MG/DL (ref 8.5–10.5)
CHLORIDE BLD-SCNC: 94 MMOL/L (ref 98–107)
CO2 SERPL-SCNC: 21 MMOL/L (ref 22–31)
COLOR UR AUTO: ABNORMAL
CREAT SERPL-MCNC: 1.42 MG/DL (ref 0.6–1.3)
EOSINOPHIL # BLD AUTO: 0 10E3/UL (ref 0–0.7)
EOSINOPHIL NFR BLD AUTO: 0 %
ERYTHROCYTE [DISTWIDTH] IN BLOOD BY AUTOMATED COUNT: 13.7 % (ref 10–15)
GFR SERPL CREATININE-BSD FRML MDRD: 42 ML/MIN/1.73M2
GLUCOSE BLD-MCNC: 91 MG/DL (ref 70–125)
GLUCOSE UR STRIP-MCNC: NEGATIVE MG/DL
HCT VFR BLD AUTO: 37.6 % (ref 35–53)
HGB BLD-MCNC: 12.9 G/DL (ref 11.7–17.7)
HGB UR QL STRIP: ABNORMAL
IMM GRANULOCYTES # BLD: 0 10E3/UL
IMM GRANULOCYTES NFR BLD: 1 %
KETONES UR STRIP-MCNC: NEGATIVE MG/DL
LEUKOCYTE ESTERASE UR QL STRIP: ABNORMAL
LYMPHOCYTES # BLD AUTO: 0.3 10E3/UL (ref 0.8–5.3)
LYMPHOCYTES NFR BLD AUTO: 8 %
MCH RBC QN AUTO: 35.2 PG (ref 26.5–33)
MCHC RBC AUTO-ENTMCNC: 34.3 G/DL (ref 31.5–36.5)
MCV RBC AUTO: 103 FL (ref 78–100)
MONOCYTES # BLD AUTO: 0.4 10E3/UL (ref 0–1.3)
MONOCYTES NFR BLD AUTO: 10 %
NEUTROPHILS # BLD AUTO: 3.6 10E3/UL (ref 1.6–8.3)
NEUTROPHILS NFR BLD AUTO: 82 %
NITRATE UR QL: NEGATIVE
PH UR STRIP: 5.5 [PH] (ref 5–7)
PLATELET # BLD AUTO: 87 10E3/UL (ref 150–450)
POTASSIUM BLD-SCNC: 4.8 MMOL/L (ref 3.5–5)
RBC # BLD AUTO: 3.66 10E6/UL (ref 3.8–5.9)
RBC URINE: 10 /HPF
SARS-COV-2 RNA RESP QL NAA+PROBE: NEGATIVE
SODIUM SERPL-SCNC: 127 MMOL/L (ref 136–145)
SP GR UR STRIP: 1.01 (ref 1–1.03)
SQUAMOUS EPITHELIAL: <1 /HPF
UROBILINOGEN UR STRIP-MCNC: <2 MG/DL
WBC # BLD AUTO: 4.4 10E3/UL (ref 4–11)
WBC URINE: 6 /HPF

## 2022-04-27 PROCEDURE — 36415 COLL VENOUS BLD VENIPUNCTURE: CPT | Performed by: STUDENT IN AN ORGANIZED HEALTH CARE EDUCATION/TRAINING PROGRAM

## 2022-04-27 PROCEDURE — U0005 INFEC AGEN DETEC AMPLI PROBE: HCPCS | Performed by: STUDENT IN AN ORGANIZED HEALTH CARE EDUCATION/TRAINING PROGRAM

## 2022-04-27 PROCEDURE — 81001 URINALYSIS AUTO W/SCOPE: CPT | Performed by: EMERGENCY MEDICINE

## 2022-04-27 PROCEDURE — U0003 INFECTIOUS AGENT DETECTION BY NUCLEIC ACID (DNA OR RNA); SEVERE ACUTE RESPIRATORY SYNDROME CORONAVIRUS 2 (SARS-COV-2) (CORONAVIRUS DISEASE [COVID-19]), AMPLIFIED PROBE TECHNIQUE, MAKING USE OF HIGH THROUGHPUT TECHNOLOGIES AS DESCRIBED BY CMS-2020-01-R: HCPCS | Performed by: STUDENT IN AN ORGANIZED HEALTH CARE EDUCATION/TRAINING PROGRAM

## 2022-04-27 PROCEDURE — 258N000003 HC RX IP 258 OP 636: Performed by: EMERGENCY MEDICINE

## 2022-04-27 PROCEDURE — 99284 EMERGENCY DEPT VISIT MOD MDM: CPT | Mod: 25

## 2022-04-27 PROCEDURE — 96360 HYDRATION IV INFUSION INIT: CPT

## 2022-04-27 PROCEDURE — 80048 BASIC METABOLIC PNL TOTAL CA: CPT | Performed by: STUDENT IN AN ORGANIZED HEALTH CARE EDUCATION/TRAINING PROGRAM

## 2022-04-27 PROCEDURE — 83880 ASSAY OF NATRIURETIC PEPTIDE: CPT | Performed by: STUDENT IN AN ORGANIZED HEALTH CARE EDUCATION/TRAINING PROGRAM

## 2022-04-27 PROCEDURE — 96361 HYDRATE IV INFUSION ADD-ON: CPT

## 2022-04-27 PROCEDURE — 85025 COMPLETE CBC W/AUTO DIFF WBC: CPT | Performed by: STUDENT IN AN ORGANIZED HEALTH CARE EDUCATION/TRAINING PROGRAM

## 2022-04-27 PROCEDURE — 99214 OFFICE O/P EST MOD 30 MIN: CPT | Performed by: STUDENT IN AN ORGANIZED HEALTH CARE EDUCATION/TRAINING PROGRAM

## 2022-04-27 RX ADMIN — SODIUM CHLORIDE 500 ML: 9 INJECTION, SOLUTION INTRAVENOUS at 19:46

## 2022-04-27 ASSESSMENT — ENCOUNTER SYMPTOMS
NAUSEA: 0
DIARRHEA: 1
ABDOMINAL PAIN: 0
HEADACHES: 1
VOMITING: 0
SHORTNESS OF BREATH: 1
COUGH: 1
APPETITE CHANGE: 1
FEVER: 1
SORE THROAT: 1

## 2022-04-27 NOTE — PROGRESS NOTES
SUBJECTIVE:  Amelia Michel is an 61 year old adult who presents for sick for 4 days.  Patient presents with about 4 days of fever to 102, nonproductive cough, myalgias, diarrhea, fatigue.  Has not had much of an appetite and has had a hard time keeping herself hydrated.  Urinating much less than usual.  Mild shortness of breath starting at the beginning of this illness.  Does take a couple of immunosuppressants for arthritis.  Vaccinated for COVID-19 x4.  No known sick contacts.  No nausea, no vomiting.  Feels dehydrated but also feels like she is becoming a little bit bloated, which is the first symptom of her heart failure acting up.  Is still taking her Lasix and has barely urinated in the last day.    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  Patient Active Problem List   Diagnosis     HTN (hypertension)     Primary pulmonary hypertension (H)     Hyperlipidemia LDL goal <130     RA (rheumatoid arthritis) (H)     Lymphedema of both lower extremities     Atrial tachycardia (H)     Cardiogenic shock (H)     Critical illness myopathy     Diffuse large B-cell lymphoma of extranodal site (H)     Diffuse large B cell lymphoma (H)     Febrile neutropenia (H)     Physical deconditioning     DLBCL (diffuse large B cell lymphoma) (H)     H/O cardiac arrest     Paroxysmal atrial fibrillation (H)     Atrial flutter, unspecified type (H)     Cervical cancer screening     Mild protein-calorie malnutrition (H)     Embolism and thrombosis of  unspecified artery (H)     Thrombocytopenia, unspecified (H)     Heart palpitations     Bradycardia     CHF (congestive heart failure) (H)     Chronic kidney disease, stage 3     Social History     Socioeconomic History     Marital status:      Spouse name: Romeo Michel     Number of children: 0     Years of education: None     Highest education level: None   Occupational History     Employer: [employer]   Tobacco Use     Smoking status: Never Smoker     Smokeless tobacco: Never Used   Substance and Sexual Activity     Alcohol use: Yes     Comment: none     Drug use: No   Other Topics Concern     Parent/sibling w/ CABG, MI or angioplasty before 65F 55M? No     Family History   Problem Relation Age of Onset     Breast Cancer Mother      Hypertension Mother      Alzheimer Disease Mother      Cerebrovascular Disease Mother      Hypertension Father      Cerebrovascular Disease Father      Atrial fibrillation Father      Lymphoma Father      Prostate Cancer Father      Diabetes Paternal Grandmother      Alzheimer Disease Maternal Grandmother         likely     Arthritis Maternal Grandfather      Pneumonia Maternal Grandfather        ALLERGIES:  Amiodarone, Blood transfusion related (informational only), Metoprolol, Other [no clinical screening - see comments], Penicillins, and Tape [adhesive tape]    Current Outpatient Medications   Medication     acetaminophen (TYLENOL) 500 MG tablet     alendronate (FOSAMAX) 70 MG tablet     apixaban ANTICOAGULANT (ELIQUIS ANTICOAGULANT) 5 MG tablet     atenolol (TENORMIN) 25 MG tablet     calcium carbonate 600 mg-vitamin D 400 units (CALTRATE) 600-400 MG-UNIT per tablet     digoxin (LANOXIN) 125 MCG tablet     furosemide (LASIX) 20 MG tablet     gabapentin (NEURONTIN) 100 MG capsule     hydroxychloroquine (PLAQUENIL) 200 MG tablet     lisinopril (ZESTRIL) 2.5 MG tablet     multivitamin, therapeutic with minerals (THERA-VIT-M) TABS tablet     predniSONE  (DELTASONE) 1 MG tablet     spironolactone (ALDACTONE) 25 MG tablet     STATIN NOT PRESCRIBED (INTENTIONAL)     No current facility-administered medications for this visit.         ROS:  ROS is done and is negative for general/constitutional, eye, ENT, Respiratory, cardiovascular, GI, , Skin, musculoskeletal except as noted elsewhere.  All other review of systems negative except as noted elsewhere.    OBJECTIVE:  /74 (BP Location: Right arm, Patient Position: Sitting, Cuff Size: Adult Regular)   Pulse 85   Temp (!) 102.3  F (39.1  C) (Oral)   Resp 16   Wt 52 kg (114 lb 9.6 oz)   SpO2 96%   BMI 24.39 kg/m    GENERAL APPEARANCE: Alert, in no acute distress.  EYES: Conjunctivae clear.  EARS: External ears normal.  NOSE: Normal, no drainage.  OROPHARYNX: MMM.  Not erythamatous.  No exudate.  NECK: Supple, symmetrical.  RESP: Clear to auscultation bilaterally, no wheezing or retractions, no increased effort.  CV: irregularly irregular rate and rhythm, no murmurs, good capillary refill.  ABDOMEN: Soft, nontender, nondistended.  No obvious edema.  SKIN: No ulcers, lesions or rash.  MUSCULOSKELETAL: No gross deformities.  Wearing compression stockings.  No obvious peripheral edema.  NEURO: No gross deficits, CN 2-12 grossly intact.    RESULTS  Results for orders placed or performed in visit on 04/27/22   XR Chest 2 Views     Status: None    Narrative    EXAM DATE:         04/27/2022    EXAM: X-RAY CHEST, 2 VIEWS, FRONTAL AND LATERAL  LOCATION: [facility]  DATE/TIME: 4/27/2022 3:45 PM    INDICATION: Cough, wheezing  COMPARISON: 12/14/2019.    IMPRESSION: Stable mild cardiac enlargement. No overt pulmonary edema. Dual-chamber pacemaker with leads over the right atrium and right ventricle. Changes of median sternotomy. Mild nonspecific interstitial prominence no focal consolidation pneumothorax   or pleural effusion. No significant change from 12/14/2019.             Basic  metabolic panel  (Ca, Cl, CO2, Creat, Gluc, K, Na, BUN)     Status: Abnormal   Result Value Ref Range    Sodium 127 (L) 136 - 145 mmol/L    Potassium 4.8 3.5 - 5.0 mmol/L    Chloride 94 (L) 98 - 107 mmol/L    Carbon Dioxide (CO2) 21 (L) 22 - 31 mmol/L    Anion Gap 12 5 - 18 mmol/L    Urea Nitrogen 25 (H) 8 - 22 mg/dL    Creatinine 1.42 (H) 0.60 - 1.30 mg/dL    Calcium 9.0 8.5 - 10.5 mg/dL    Glucose 91 70 - 125 mg/dL    GFR Estimate 42 (L) >60 mL/min/1.73m2    Narrative    The sex of this patient cannot be reliably determined based on discrepancies in demographics (legal sex, sex assigned at birth, gender identity).  Both male and female reference ranges are provided where applicable.  Careful evaluation of the patient s results as compared to the gender specific reference intervals is required in this setting.    B-Type Natriuretic Peptide ([facility] Only)     Status: Abnormal   Result Value Ref Range     (H) 0 - 96 pg/mL    Narrative    The sex of this patient cannot be reliably determined based on discrepancies in demographics (legal sex, sex assigned at birth, gender identity).  Both male and female reference ranges are provided where applicable.  Careful evaluation of the patient s results as compared to the gender specific reference intervals is required in this setting.    CBC with platelets and differential     Status: Abnormal   Result Value Ref Range    WBC Count 4.4 4.0 - 11.0 10e3/uL    RBC Count 3.66 (L) 3.80 - 5.90 10e6/uL    Hemoglobin 12.9 11.7 - 17.7 g/dL    Hematocrit 37.6 35.0 - 53.0 %     (H) 78 - 100 fL    MCH 35.2 (H) 26.5 - 33.0 pg    MCHC 34.3 31.5 - 36.5 g/dL    RDW 13.7 10.0 - 15.0 %    Platelet Count 87 (L) 150 - 450 10e3/uL    % Neutrophils 82 %    % Lymphocytes 8 %    % Monocytes 10 %    % Eosinophils 0 %    % Basophils 1 %    % Immature Granulocytes 1 %    Absolute Neutrophils 3.6 1.6 - 8.3 10e3/uL    Absolute Lymphocytes 0.3 (L) 0.8 - 5.3 10e3/uL    Absolute Monocytes 0.4  0.0 - 1.3 10e3/uL    Absolute Eosinophils 0.0 0.0 - 0.7 10e3/uL    Absolute Basophils 0.0 0.0 - 0.2 10e3/uL    Absolute Immature Granulocytes 0.0 <=0.4 10e3/uL    Narrative    The sex of this patient cannot be reliably determined based on discrepancies in demographics (legal sex, sex assigned at birth, gender identity).  Both male and female reference ranges are provided where applicable.  Careful evaluation of the patient s results as compared to the gender specific reference intervals is required in this setting.    CBC with platelets and differential     Status: Abnormal    Narrative    The following orders were created for panel order CBC with platelets and differential.  Procedure                               Abnormality         Status                     ---------                               -----------         ------                     CBC with platelets and d...[[id number]]  Abnormal            Final result                 Please view results for these tests on the individual orders.     Recent Results (from the past 48 hour(s))   XR Chest 2 Views    Narrative    EXAM DATE:         04/27/2022    EXAM: X-RAY CHEST, 2 VIEWS, FRONTAL AND LATERAL  LOCATION: [facility]  DATE/TIME: 4/27/2022 3:45 PM    INDICATION: Cough, wheezing  COMPARISON: 12/14/2019.    IMPRESSION: Stable mild cardiac enlargement. No overt pulmonary edema. Dual-chamber pacemaker with leads over the right atrium and right ventricle. Changes of median sternotomy. Mild nonspecific interstitial prominence no focal consolidation pneumothorax   or pleural effusion. No significant change from 12/14/2019.                 ASSESSMENT/PLAN:  (R05.9) Cough  (primary encounter diagnosis)  Comment: Patient's presentation is consistent with a viral infection but given her significant chronic diseases checked labs and CXR to rule out pneumonia or significant metabolic abnormality.  Unfortunately, labs returned showing sodium  127 and increased creatinine to 1.42 from her prior values, so although her WBC was not elevated and CXR was overall clear except for a little bit of new interstitial prominence it does seem that she requires further evaluation in the emergency department at this time.  She is somewhat immunosuppressed for her medications and definitely appears to be a little bit volume down although she states she actually feels little bit bloated in her abdomen.  I do not see any obvious evidence of volume overload, but as above I think she needs further evaluation in the emergency department.  Obtained COVID-19 test as well.  Called patient and communicated this plan and she is going to head to the emergency department.  Called [facility] emergency department and handed off to the receiving provider.  Plan: Symptomatic; Yes; 4/23/2022 COVID-19 Virus         (Coronavirus) by PCR Nose, Basic metabolic         panel  (Ca, Cl, CO2, Creat, Gluc, K, Na, BUN),         CBC with platelets and differential, XR Chest 2        Views, B-Type Natriuretic Peptide ( [facility]         Only)        (E86.0) Dehydration  Comment: As above.  Plan: As above.    PPE worn: Full PPE.    Options for treatment and follow-up care were reviewed with the patient and/or guardian. Amelia Michel and/or guardian engaged in the decision making process and verbalized understanding of the options discussed and agreed with the final plan.    See [facility] for orders, medications, letters, patient instructions    Ramón Anderson MD

## 2022-04-27 NOTE — ED TRIAGE NOTES
Pt sent from clinic for further evaluation of dehydration, hyponatremia, abnormal kidney function . C/o worse diarrhea x4 days with decreased appetite.

## 2022-04-27 NOTE — ED NOTES
Expected Patient Referral to ER    5:01 PM    Referring clinic/provider: Dr. Anderson    Reason for referral: few days of fatigue, fever, cough. labwork shows dehydration, CELSA, hx of heart failure.     Mode of arrival: Efren Neves MD  04/27/22 5008

## 2022-04-28 DIAGNOSIS — Q21.0 VSD (VENTRICULAR SEPTAL DEFECT): ICD-10-CM

## 2022-04-28 DIAGNOSIS — I47.19 ATRIAL TACHYCARDIA (H): ICD-10-CM

## 2022-04-28 DIAGNOSIS — I48.0 PAROXYSMAL ATRIAL FIBRILLATION (H): Primary | ICD-10-CM

## 2022-04-28 NOTE — TELEPHONE ENCOUNTER
----- Message from Jesus Anderson MD sent at 4/27/2022  7:17 PM CDT -----  Could you please call the patient with these results? Thanks!    Your COVID-19 test was negative.  Please feel free to call with any questions or concerns.    Ramón Anderson MD  [facility], PGY-3

## 2022-04-28 NOTE — DISCHARGE INSTRUCTIONS
You were seen in the emergency department today for low sodium, increased creatinine, and viral symptoms.      Over the next few days, rest and drink plenty of water and other fluids.  Fizzy liquids like ginger ale might feel better in your stomach.  Eat foods that are gentle on your stomach,like the BRAT diet (Banana, Rice, Apple Sauce, Toast).      Please return to the ER if you experience chest pain, shortness of breath, fever not better with tylenol, inability to keep food/fluids down, and/or for any other new or concerning symptoms, otherwise please follow up with your primary doctor in 5-7 days for recheck.     Below is some information that you might find informative and useful.    Thank you for choosing [facility]. It was a pleasure taking care of you today!  - Dr. Kelly Mckeon

## 2022-04-28 NOTE — ED PROVIDER NOTES
EMERGENCY DEPARTMENT ENCOUNTER      NAME: Amelia Michel  YOB: 1960  MRN: [id number]      FINAL IMPRESSION  1. Hyponatremia    2. Acute kidney injury (H)    3. Viral illness        MEDICAL DECISION MAKING   Pertinent Labs & Imaging studies reviewed. (See chart for details)    Amelia Michel is a 61 year old adult who presents for evaluation of laboratory abnormalities. Patient was seen in clinic earlier today for evaluation of a fever, cough, diarrhea, and fatigue. She had a workup there including CXR and labs. She was subsequently called and told to come to the ED after labs revealed hyponatremia, increased creatinine and BNP. Patient confirmed this history when we met. She states she started to feel sick about 4 days ago and admits that she had not been doing a great job keeping up with her fluids. Although she has had symptoms of illness, she believes she has a virus and would not have come to the ED were it not for her primary provider advising her to do so. She has been taking all of her medications as prescribed and has no other new complaints.  Vitals on arrival stable. Remainder of history and exam, as below.     At time of my initial evaluation, patient had received 500 ml NS ordered by triage MD. Although repeat labs were ordered, she declined to have her blood drawn as she did not believe it necessary after having just done labs in clinic about 2 hours PTA. I did review results from clinic which were notable for hyponatremia, slightly elevated creatinine, and elevated BNP. Patient is tolerating PO and believes she will be able to increase her fluid intake at home. Although BNP was elevated, CXR at clinic did not show acute pulmonary edema and she does not appear hypervolemic. Patient has a mild intermittent cough but denies dyspnea and has no hypoxia on RA. She is not at all interested in further workup/treatment in the ED and requested discharge during our initial encounter. She has a  follow up appointment already scheduled for repeat labs and was eager to go home and sleep. She is immunocompromised so would prefer not to be around anyone in the ED who is sick. I believe this sounds like a very reasonable plan. She has been hemodynamically stable here, not requiring supplementary O2, and tolerating PO so will plan to discontinue labs that were ordered on arrival and discharge.     We reviewed warning signs and symptoms, and I instructed   Marilu to return to the emergency department immediately if Amelia Michel develops any new or worsening symptoms. I provided additional verbal discharge instructions.   Marilu expressed understanding and agreement with this plan of care, Amelia Michel's questions were answered, and Amelia Michel was discharged in stable condition.       ED COURSE  9:14 PM I met with the patient for the initial interview and physical examination. Discussed plan for treatment and workup in the ED. PPE worn: surgical mask, gloves.   9:25 PM We discussed the plan for discharge and the patient is agreeable. Reviewed supportive cares, symptomatic treatment, outpatient follow up, and reasons to return to the Emergency Department. Patient to be discharged by ED RN.        MEDICATIONS GIVEN IN THE ED  Medications   0.9% sodium chloride BOLUS (0 mLs Intravenous Stopped 4/27/22 2131)       NEW PRESCRIPTIONS STARTED AT TODAY'S VISIT  Discharge Medication List as of 4/27/2022  9:32 PM             =================================================================    Chief Complaint   Patient presents with     Dehydration     Diarrhea         HPI:    Patient information was obtained from: Patient     Use of : N/A     Amelia Michel is a 61 year old adult with a pertinent medical history of hypertension, hyperlipidemia, VSD s/p repair, atrial fibrillation, CHF, cardiac arrest, and blood clots who presents to this ED by walk in for evaluation of dehydration, cough, headache,  and fevers.    Per chart review, patient was seen in clinic earlier today (4/27/2022) for evaluation of fever, cough, diarrhea, and fatigue. Chest xray showed mild nonspecific interstitial prominence, but no focal consolidation, pneumothorax or pleural effusion. Labs returned showing sodium 127, increased creatinine to 1.42, and . Covid testing was negative. Patient was referred to the ED for further evaluation.        Patient states that she was seen at her heart care clinic 1 week ago at which time she had labs done and her sodium was found to be 127 and her creatinine was elevated. She was instructed to increase her fluid intake and follow-up for lab recheck. On Saturday (4 days ago), the patient then started to feel generally unwell with symptoms including cough, sore throat, constant headache, increased shortness of breath especially with activity, decrease appetite, generalized weakness, and fevers. She reports a max temperature of 101.3 F. She has been taking Tylenol for her fevers, which helps. Patient adds that she has chronic diarrhea that has been worse since Saturday as well. She was seen in clinic earlier today to have her labs rechecked and she later received a call instructing her to present to the ED for low sodium and elevated creatinine. She states that she has been taking her Lasix as prescribed. Denies recent travel. No history of lung disease. She otherwise denies chest pain, abdominal pain, nausea, vomiting, or additional symptoms or complaints at this time.       RELEVANT HISTORY, MEDICATIONS, & ALLERGIES   Past medical history, surgical history, family history, medications, and allergies reviewed and pertinent noted in HPI. See end of note for comprehensive list.    REVIEW OF SYSTEMS:  Review of Systems   Constitutional: Positive for appetite change and fever.        Positive for general malaise and generalized weakness   HENT: Positive for sore throat.    Respiratory: Positive for  "cough and shortness of breath.    Cardiovascular: Negative for chest pain.   Gastrointestinal: Positive for diarrhea (chronic). Negative for abdominal pain, nausea and vomiting.   Neurological: Positive for headaches.   All other systems reviewed and are negative.      PHYSICAL EXAM:    Vitals: BP 98/68   Pulse 73   Temp 98.1  F (36.7  C) (Oral)   Resp 18   Ht 1.448 m (4' 9\")   Wt 53.5 kg (118 lb)   SpO2 99%   BMI 25.53 kg/m     General: Appears fatigued but in no acute distress. Alert and interactive, comfortable appearing.  HENT: Oropharynx without erythema or exudates. MMM.   Neck: Full AROM.   Cardiovascular: Regular rate and rhythm. Peripheral pulses 2+ bilaterally.  Chest/Pulmonary: Normal work of breathing. Lung sounds clear and equal throughout, no wheezes or crackles.   Abdomen: Soft, nondistended.   Extremities: Normal ROM of all major joints.   Skin: Warm and dry. Normal skin color.   Neuro: Speech clear. CNs grossly intact. Moves all extremities appropriately. Strength and sensation grossly intact to all extremities.   Psych: Normal affect/mood, cooperative, memory appropriate.     LAB  Labs Ordered and Resulted from Time of ED Arrival to Time of ED Departure   ROUTINE UA WITH MICROSCOPIC REFLEX TO CULTURE - Abnormal       Result Value    Color Urine Light Yellow      Appearance Urine Clear      Glucose Urine Negative      Bilirubin Urine Negative      Ketones Urine Negative      Specific Gravity Urine 1.009      Blood Urine 0.5 mg/dL (*)     pH Urine 5.5      Protein Albumin Urine 10  (*)     Urobilinogen Urine <2.0      Nitrite Urine Negative      Leukocyte Esterase Urine 75 Abel/uL (*)     RBC Urine 10 (*)     WBC Urine 6 (*)     Squamous Epithelials Urine <1         Comprehensive outline of EPIC chart Hx  PAST MEDICAL HISTORY    Past Medical History:   Diagnosis Date     Arthritis      Atrial tachycardia (H)      Cardiac arrest (H)      Cardiac arrest (H)      Critical illness myopathy      " Diffuse large B-cell lymphoma (H)      Embolism and thrombosis of unspecified artery (H)      History of blood clots 2017    PICC line Right arm      History of chemotherapy      History of transfusion      Hx antineoplastic chemotherapy     lymphoma     Hypertension      Hypertension      Lymphedema of both lower extremities      Neuropathy      Physical deconditioning      Positive PPD, 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     Thrombocytopenia (H)      VSD (ventricular septal defect)      Past Surgical History:   Procedure Laterality Date     ANESTHESIA CARDIOVERSION N/A 5/17/2017    Procedure: ANESTHESIA CARDIOVERSION;  ANESTHESIA CARDIOVERSION;  Surgeon: GENERIC ANESTHESIA PROVIDER;  Location: [facility]     ANESTHESIA CARDIOVERSION  3/12/2018    Procedure: ANESTHESIA CARDIOVERSION;;  Surgeon: GENERIC ANESTHESIA PROVIDER;  Location: [facility]     ANESTHESIA CARDIOVERSION N/A 3/23/2018    Procedure: ANESTHESIA CARDIOVERSION;  Anesthesia Cardioversion ;  Surgeon: GENERIC ANESTHESIA PROVIDER;  Location: [facility]     ANESTHESIA CARDIOVERSION N/A 4/12/2018    Procedure: ANESTHESIA CARDIOVERSION;  Cardioversion;  Surgeon: GENERIC ANESTHESIA PROVIDER;  Location:  OR     ARTHRODESIS WRIST  2000    Right wrist     BONE MARROW ASPIRATE &BIOPSY  7/12/2017          BONE MARROW BIOPSY W/ ASPIRATION  07/12/2017     CARDIOVERSION      5/17/17, 3/12/18, 3/23/18, 4/14/18,      COLONOSCOPY N/A 11/19/2019    Procedure: Colonoscopy, With Polypectomy And Biopsy;  Surgeon: Pj Vasques MD;  Location: [facility]     EP PACEMAKER N/A 12/13/2019    Procedure: EP Pacemaker;  Surgeon: Pj Trent MD;  Location:  HEART CARDIAC CATH LAB     EXCISE LESION UPPER EXTREMITY Left 5/22/2018    Procedure: EXCISE LESION UPPER EXTREMITY;  Left Arm Wound Excision And Closure, Possible Submuscular Transposition of Ulnar Nerve  (Choice Anesthesia);  Surgeon: Kevin Sheldon MD;  Location:  OR     FOOT SURGERY      4 left and 2 right     FOOT SURGERY      4 Left and 2 Right       IMPLANT PACEMAKER  12/13/2019    Dr China Trent  [facility] Cardiac Cath lab      IMPLANT PACEMAKER       RELEASE CARPAL TUNNEL Bilateral      RELEASE CARPAL TUNNEL BILATERAL       REPAIR VENTRICULAR SEPTAL DEFECT  1969     TRANSPOSITION ULNAR NERVE (ELBOW) Left 5/22/2018    Procedure: TRANSPOSITION ULNAR NERVE (ELBOW);;  Surgeon: Kevin Sheldon MD;  Location: [facility]     ULNAR NERVE TRANSPOSITION Left 05/22/2018     VSD REPAIR  1969     ZZC FUSION FOOT BONES,SUBTALAR Right 10/20/2020    Procedure: RIGHT SUBTALAR JOINT FUSION AND CALCANEOCUBOID FUSION;  Surgeon: Nemesio Crow MD;  Location: [facility];  Service: Orthopedics       CURRENT MEDICATIONS    Current Outpatient Medications   Medication Instructions     acetaminophen (TYLENOL) 500 mg, Oral, EVERY 6 HOURS PRN     alendronate (FOSAMAX) 70 mg, Oral, EVERY 7 DAYS     apixaban ANTICOAGULANT (ELIQUIS ANTICOAGULANT) 5 mg, Oral, 2 TIMES DAILY     atenolol (TENORMIN) 25 MG tablet 50 mg in AM and 25 mg in the evening     calcium carbonate 600 mg-vitamin D 400 units (CALTRATE) 600-400 MG-UNIT per tablet 2 tablets, Oral, DAILY     digoxin (LANOXIN) 125 mcg, Oral, DAILY     furosemide (LASIX) 20 mg, Oral, DAILY     gabapentin (NEURONTIN) 100 MG capsule TAKE 2 CAPSULES(200 MG) BY MOUTH THREE TIMES DAILY     hydroxychloroquine (PLAQUENIL) 200 mg, Oral, DAILY     lisinopril (ZESTRIL) 2.5 mg, Oral, DAILY     multivitamin, therapeutic with minerals (THERA-VIT-M) TABS tablet 1 tablet, Oral, DAILY     predniSONE (DELTASONE) 3 mg, Oral, DAILY     spironolactone (ALDACTONE) 50 mg, Oral, DAILY     STATIN NOT PRESCRIBED (INTENTIONAL) Please choose reason not prescribed, below       ALLERGIES    Allergies   Allergen Reactions     Amiodarone Other (See Comments) and Difficulty breathing     Lethargic and had trouble breathing- occurred in 2017     Blood Transfusion Related (Informational Only) Hives     Hives with platelets. Give benadryl premedication.     Metoprolol  Other (See Comments)     Pt and  report that metoprolol does not work for her and also reports feeling unwell with this medication. She has been able to tolerate atenolol, which as worked in controlling her HR.      Other [No Clinical Screening - See Comments]      Penicillin Allergy Skin Test not performed, see antimicrobial management team progress note 7/5/17.       Penicillins      Tape [Adhesive Tape] Rash       FAMILY HISTORY    Family History   Problem Relation Age of Onset     Breast Cancer Mother      Hypertension Mother      Alzheimer Disease Mother      Cerebrovascular Disease Mother      Hypertension Father      Cerebrovascular Disease Father      Atrial fibrillation Father      Lymphoma Father      Prostate Cancer Father      Diabetes Paternal Grandmother      Alzheimer Disease Maternal Grandmother         likely     Arthritis Maternal Grandfather      Pneumonia Maternal Grandfather        SOCIAL HISTORY    Social History     Socioeconomic History     Marital status:      Spouse name: Romeo Michel     Number of children: 0   Occupational History     Employer: [employer]   Tobacco Use     Smoking status: Never Smoker     Smokeless tobacco: Never Used   Substance and Sexual Activity     Alcohol use: Yes     Comment: none     Drug use: No   Other Topics Concern     Parent/sibling w/ CABG, MI or angioplasty before 65F 55M? No       I, Alyssia Perez, am serving as a scribe to document services personally performed by Dr. Kelly Mckeon based on my observation and the provider's statements to me. I, Kelly Mckeon MD attest that Alyssia Perez is acting in a scribe capacity, has observed my performance of the services and has documented them in accordance with my direction.    Kelly Mckeon M.D.  Emergency Medicine  [facility] EMERGENCY DEPARTMENT  [facility] [facility] [id number] [phone]  Dept: [phone]     Mike  Kelly KUO MD  04/29/22 0337

## 2022-05-09 ENCOUNTER — MYC MEDICAL ADVICE (OUTPATIENT)
Dept: FAMILY MEDICINE | Facility: CLINIC | Age: 62
End: 2022-05-09
Payer: COMMERCIAL

## 2022-05-09 DIAGNOSIS — M06.9 RHEUMATOID ARTHRITIS OF BOTH ANKLES, UNSPECIFIED WHETHER RHEUMATOID FACTOR PRESENT (H): Chronic | ICD-10-CM

## 2022-05-10 ENCOUNTER — LAB (OUTPATIENT)
Dept: LAB | Facility: CLINIC | Age: 62
End: 2022-05-10
Payer: COMMERCIAL

## 2022-05-10 DIAGNOSIS — I50.40 COMBINED SYSTOLIC AND DIASTOLIC CONGESTIVE HEART FAILURE, UNSPECIFIED HF CHRONICITY (H): ICD-10-CM

## 2022-05-10 LAB
ANION GAP SERPL CALCULATED.3IONS-SCNC: 14 MMOL/L (ref 5–18)
BUN SERPL-MCNC: 44 MG/DL (ref 8–22)
CALCIUM SERPL-MCNC: 10.5 MG/DL (ref 8.5–10.5)
CHLORIDE BLD-SCNC: 94 MMOL/L (ref 98–107)
CO2 SERPL-SCNC: 22 MMOL/L (ref 22–31)
CREAT SERPL-MCNC: 1.47 MG/DL (ref 0.6–1.3)
GFR SERPL CREATININE-BSD FRML MDRD: 40 ML/MIN/1.73M2
GLUCOSE BLD-MCNC: 92 MG/DL (ref 70–125)
POTASSIUM BLD-SCNC: 5.1 MMOL/L (ref 3.5–5)
SODIUM SERPL-SCNC: 130 MMOL/L (ref 136–145)

## 2022-05-10 PROCEDURE — 36415 COLL VENOUS BLD VENIPUNCTURE: CPT

## 2022-05-10 PROCEDURE — 80048 BASIC METABOLIC PNL TOTAL CA: CPT

## 2022-05-11 ENCOUNTER — PATIENT OUTREACH (OUTPATIENT)
Dept: CARDIOLOGY | Facility: CLINIC | Age: 62
End: 2022-05-11
Payer: COMMERCIAL

## 2022-05-11 DIAGNOSIS — I50.9 CONGESTIVE HEART FAILURE, UNSPECIFIED HF CHRONICITY, UNSPECIFIED HEART FAILURE TYPE (H): Primary | ICD-10-CM

## 2022-05-11 NOTE — TELEPHONE ENCOUNTER
Results reviewed by Emerita Patel and sent in BalaBit message to Amelia along with a low potassium diet instructions. Aga Reyes RN     Date: 5/11/2022  Time of Call: 1:11 PM  Diagnosis:  HF, hyperkalemia  [facility]OR[facility] - Ordering provider: Emerita Patel NP  Order: BMP in 2 weeks.  Low potassium diet for now  Order received by: Aga Reyes RN

## 2022-05-12 RX ORDER — GABAPENTIN 100 MG/1
100 CAPSULE ORAL 3 TIMES DAILY
Qty: 540 CAPSULE | Refills: 1 | Status: SHIPPED | OUTPATIENT
Start: 2022-05-12 | End: 2022-06-08

## 2022-05-24 ENCOUNTER — LAB (OUTPATIENT)
Dept: LAB | Facility: CLINIC | Age: 62
End: 2022-05-24
Payer: COMMERCIAL

## 2022-05-24 DIAGNOSIS — I50.9 CONGESTIVE HEART FAILURE, UNSPECIFIED HF CHRONICITY, UNSPECIFIED HEART FAILURE TYPE (H): ICD-10-CM

## 2022-05-24 LAB
ANION GAP SERPL CALCULATED.3IONS-SCNC: 12 MMOL/L (ref 5–18)
BUN SERPL-MCNC: 38 MG/DL (ref 8–22)
CALCIUM SERPL-MCNC: 10.8 MG/DL (ref 8.5–10.5)
CHLORIDE BLD-SCNC: 99 MMOL/L (ref 98–107)
CO2 SERPL-SCNC: 22 MMOL/L (ref 22–31)
CREAT SERPL-MCNC: 1.48 MG/DL (ref 0.6–1.3)
GFR SERPL CREATININE-BSD FRML MDRD: 40 ML/MIN/1.73M2
GLUCOSE BLD-MCNC: 102 MG/DL (ref 70–125)
POTASSIUM BLD-SCNC: 4.9 MMOL/L (ref 3.5–5)
SODIUM SERPL-SCNC: 133 MMOL/L (ref 136–145)

## 2022-05-24 PROCEDURE — 80048 BASIC METABOLIC PNL TOTAL CA: CPT

## 2022-05-24 PROCEDURE — 36415 COLL VENOUS BLD VENIPUNCTURE: CPT

## 2022-05-31 ENCOUNTER — TELEPHONE (OUTPATIENT)
Dept: CARDIOLOGY | Facility: CLINIC | Age: 62
End: 2022-05-31

## 2022-05-31 NOTE — TELEPHONE ENCOUNTER
----- Message from Pao Alston RN sent at 5/31/2022 10:50 AM CDT -----  Hi - can you reach out to Bree to schedule CORE follow up in the next few weeks? Ok to use VAD/txp spot if needed. ThanksSosa     ----- Message -----  From: Halle Vasquez APRN CNP  Sent: 5/28/2022   8:40 AM CDT  To: Cardiology [facility]    Can we ensure Bree gets a CORE follow up in a couple weeks.     ThanksHalle

## 2022-06-03 ENCOUNTER — PATIENT OUTREACH (OUTPATIENT)
Dept: CARDIOLOGY | Facility: CLINIC | Age: 62
End: 2022-06-03
Payer: COMMERCIAL

## 2022-06-03 NOTE — TELEPHONE ENCOUNTER
Called Amelia to follow up on getting scheduled in clinic. Per Halle Vasquez should schedule follow up in next few weeks in CORE Clinic. Schedulers called earlier this week - left message and mychart message sent.   I called today to schedule follow up - left message asking her to call back to schedule. Pao Alston RN

## 2022-06-08 ENCOUNTER — OFFICE VISIT (OUTPATIENT)
Dept: FAMILY MEDICINE | Facility: CLINIC | Age: 62
End: 2022-06-08
Payer: COMMERCIAL

## 2022-06-08 VITALS
SYSTOLIC BLOOD PRESSURE: 109 MMHG | WEIGHT: 116.7 LBS | RESPIRATION RATE: 17 BRPM | HEIGHT: 57 IN | HEART RATE: 70 BPM | TEMPERATURE: 97.9 F | BODY MASS INDEX: 25.17 KG/M2 | DIASTOLIC BLOOD PRESSURE: 59 MMHG | OXYGEN SATURATION: 98 %

## 2022-06-08 DIAGNOSIS — I89.0 LYMPHEDEMA OF BOTH LOWER EXTREMITIES: ICD-10-CM

## 2022-06-08 DIAGNOSIS — E24.2 IATROGENIC CUSHINGOID FEATURES (H): ICD-10-CM

## 2022-06-08 DIAGNOSIS — D69.6 THROMBOCYTOPENIA, UNSPECIFIED (H): ICD-10-CM

## 2022-06-08 DIAGNOSIS — I47.19 ATRIAL TACHYCARDIA (H): ICD-10-CM

## 2022-06-08 DIAGNOSIS — T38.0X5A STEROID-INDUCED OSTEOPOROSIS: ICD-10-CM

## 2022-06-08 DIAGNOSIS — I74.9 EMBOLISM AND THROMBOSIS OF UNSPECIFIED ARTERY (H): ICD-10-CM

## 2022-06-08 DIAGNOSIS — R50.81 FEBRILE NEUTROPENIA (H): ICD-10-CM

## 2022-06-08 DIAGNOSIS — R73.09 ELEVATED GLUCOSE: ICD-10-CM

## 2022-06-08 DIAGNOSIS — F43.23 ADJUSTMENT DISORDER WITH MIXED ANXIETY AND DEPRESSED MOOD: Primary | ICD-10-CM

## 2022-06-08 DIAGNOSIS — C83.30 DIFFUSE LARGE B-CELL LYMPHOMA, UNSPECIFIED BODY REGION (H): ICD-10-CM

## 2022-06-08 DIAGNOSIS — D70.9 FEBRILE NEUTROPENIA (H): ICD-10-CM

## 2022-06-08 DIAGNOSIS — G47.01 INSOMNIA DUE TO MEDICAL CONDITION: ICD-10-CM

## 2022-06-08 DIAGNOSIS — N18.31 STAGE 3A CHRONIC KIDNEY DISEASE (H): ICD-10-CM

## 2022-06-08 DIAGNOSIS — M81.8 STEROID-INDUCED OSTEOPOROSIS: ICD-10-CM

## 2022-06-08 DIAGNOSIS — M06.9 RHEUMATOID ARTHRITIS OF BOTH ANKLES, UNSPECIFIED WHETHER RHEUMATOID FACTOR PRESENT (H): Chronic | ICD-10-CM

## 2022-06-08 DIAGNOSIS — I50.9 CONGESTIVE HEART FAILURE, UNSPECIFIED HF CHRONICITY, UNSPECIFIED HEART FAILURE TYPE (H): ICD-10-CM

## 2022-06-08 DIAGNOSIS — R57.0 CARDIOGENIC SHOCK (H): ICD-10-CM

## 2022-06-08 DIAGNOSIS — E87.1 HYPONATREMIA: ICD-10-CM

## 2022-06-08 DIAGNOSIS — R19.7 DIARRHEA, UNSPECIFIED TYPE: ICD-10-CM

## 2022-06-08 DIAGNOSIS — I48.92 ATRIAL FLUTTER, UNSPECIFIED TYPE (H): ICD-10-CM

## 2022-06-08 DIAGNOSIS — E44.1 MILD PROTEIN-CALORIE MALNUTRITION (H): ICD-10-CM

## 2022-06-08 LAB
ANION GAP SERPL CALCULATED.3IONS-SCNC: 6 MMOL/L (ref 3–14)
BUN SERPL-MCNC: 42 MG/DL (ref 7–30)
CALCIUM SERPL-MCNC: 10.1 MG/DL (ref 8.5–10.1)
CHLORIDE BLD-SCNC: 96 MMOL/L (ref 94–109)
CO2 SERPL-SCNC: 27 MMOL/L (ref 20–32)
CREAT SERPL-MCNC: 1.14 MG/DL (ref 0.52–1.25)
GFR SERPL CREATININE-BSD FRML MDRD: 55 ML/MIN/1.73M2
GLUCOSE BLD-MCNC: 89 MG/DL (ref 70–99)
HBA1C MFR BLD: 5.6 % (ref 0–5.6)
POTASSIUM BLD-SCNC: 4.9 MMOL/L (ref 3.4–5.3)
SODIUM SERPL-SCNC: 129 MMOL/L (ref 133–144)

## 2022-06-08 PROCEDURE — 99215 OFFICE O/P EST HI 40 MIN: CPT | Performed by: PHYSICIAN ASSISTANT

## 2022-06-08 PROCEDURE — 36415 COLL VENOUS BLD VENIPUNCTURE: CPT | Performed by: PHYSICIAN ASSISTANT

## 2022-06-08 PROCEDURE — 83036 HEMOGLOBIN GLYCOSYLATED A1C: CPT | Performed by: PHYSICIAN ASSISTANT

## 2022-06-08 PROCEDURE — 80048 BASIC METABOLIC PNL TOTAL CA: CPT | Performed by: PHYSICIAN ASSISTANT

## 2022-06-08 RX ORDER — LORATADINE 10 MG/1
10 TABLET ORAL DAILY PRN
COMMUNITY

## 2022-06-08 RX ORDER — TRAZODONE HYDROCHLORIDE 50 MG/1
50-100 TABLET, FILM COATED ORAL AT BEDTIME
Qty: 60 TABLET | Refills: 0 | Status: SHIPPED | OUTPATIENT
Start: 2022-06-08 | End: 2022-08-26

## 2022-06-08 RX ORDER — GABAPENTIN 100 MG/1
CAPSULE ORAL
Qty: 360 CAPSULE | Refills: 1 | Status: SHIPPED | OUTPATIENT
Start: 2022-06-08 | End: 2022-12-19

## 2022-06-08 ASSESSMENT — ANXIETY QUESTIONNAIRES
7. FEELING AFRAID AS IF SOMETHING AWFUL MIGHT HAPPEN: MORE THAN HALF THE DAYS
4. TROUBLE RELAXING: MORE THAN HALF THE DAYS
5. BEING SO RESTLESS THAT IT IS HARD TO SIT STILL: SEVERAL DAYS
GAD7 TOTAL SCORE: 13
7. FEELING AFRAID AS IF SOMETHING AWFUL MIGHT HAPPEN: MORE THAN HALF THE DAYS
GAD7 TOTAL SCORE: 13
1. FEELING NERVOUS, ANXIOUS, OR ON EDGE: MORE THAN HALF THE DAYS
GAD7 TOTAL SCORE: 13
8. IF YOU CHECKED OFF ANY PROBLEMS, HOW DIFFICULT HAVE THESE MADE IT FOR YOU TO DO YOUR WORK, TAKE CARE OF THINGS AT HOME, OR GET ALONG WITH OTHER PEOPLE?: SOMEWHAT DIFFICULT
6. BECOMING EASILY ANNOYED OR IRRITABLE: MORE THAN HALF THE DAYS
2. NOT BEING ABLE TO STOP OR CONTROL WORRYING: MORE THAN HALF THE DAYS
3. WORRYING TOO MUCH ABOUT DIFFERENT THINGS: MORE THAN HALF THE DAYS

## 2022-06-08 ASSESSMENT — PATIENT HEALTH QUESTIONNAIRE - PHQ9
SUM OF ALL RESPONSES TO PHQ QUESTIONS 1-9: 17
10. IF YOU CHECKED OFF ANY PROBLEMS, HOW DIFFICULT HAVE THESE PROBLEMS MADE IT FOR YOU TO DO YOUR WORK, TAKE CARE OF THINGS AT HOME, OR GET ALONG WITH OTHER PEOPLE: SOMEWHAT DIFFICULT
SUM OF ALL RESPONSES TO PHQ QUESTIONS 1-9: 17

## 2022-06-08 NOTE — PATIENT INSTRUCTIONS
Increase gabapentin to 100 mg AM + 100 mg afternoon + 200 mg bedtime    Trazodone: you can try 50 mg at night  If needed, can increase to 100 mg at night  Consider sleep clinic referral    Start zoloft 25 mg for 3-7 days, then increase to 50 mg daily  Consider therapy    If you notice any signs of serotonin syndrome be sure to be evaluated right away - I think we should be ok with these dosages, but there still is some risk as we discussed. Symptoms of too much serotonin include: muscle twitching, confusion, agitation, weakness, insomnia, fever, sweats.

## 2022-06-08 NOTE — PROGRESS NOTES
Assessment & Plan     ASSESSMENT/PLAN:      ICD-10-CM    1. Adjustment disorder with mixed anxiety and depressed mood  F43.23 sertraline (ZOLOFT) 50 MG tablet   2. Rheumatoid arthritis of both ankles, unspecified whether rheumatoid factor present (H)  M06.9 gabapentin (NEURONTIN) 100 MG capsule   3. Insomnia due to medical condition  G47.01 traZODone (DESYREL) 50 MG tablet   4. Elevated glucose  R73.09 Hemoglobin A1c     Hemoglobin A1c   5. Hyponatremia  E87.1 Basic metabolic panel  (Ca, Cl, CO2, Creat, Gluc, K, Na, BUN)     Basic metabolic panel  (Ca, Cl, CO2, Creat, Gluc, K, Na, BUN)   6. Lymphedema of both lower extremities  I89.0 Compression Sleeve/Stocking Order for DME - ONLY FOR DME   7. Diffuse large B-cell lymphoma, unspecified body region (H)  C83.30    8. Cardiogenic shock (H)  R57.0    9. Stage 3a chronic kidney disease (H)  N18.31    10. Congestive heart failure, unspecified HF chronicity, unspecified heart failure type (H)  I50.9    11. Atrial tachycardia (H)  I47.1    12. Atrial flutter, unspecified type (H)  I48.92    13. Iatrogenic cushingoid features (H)  E24.2    14. Mild protein-calorie malnutrition (H)  E44.1    15. Thrombocytopenia, unspecified (H)  D69.6    16. Steroid-induced osteoporosis  M81.8     T38.0X5A    17. Febrile neutropenia (H)  D70.9     R50.81    18. Diarrhea, unspecified type  R19.7    19. Embolism and thrombosis of unspecified artery (H)  I74.9      - depression: patient denies SI. She feels mood is worsening due to her medical conditions and all the appointments. This is understandable. Life is getting very difficult with her health. She is willing to trial SSRI. Looked up interactions, digoxin interacts with prozac but not zoloft. Will trial zoloft. Discussed risks/benefits. Also per her request trial low dose trazodone. She would like to stay at low doses of these medications if possible. We discussed possibility of serotonin syndrome   Recommended therapy, patient  declines as it feels like just another appointment, but she will consider.  Did recommend close follow up.    - diarrhea: resolved.  - RA: follows with rheumatology. Will increase gabapentin. Had been at higher dose then had insurance troubles and dose was decreased.  - hyponatremia: follows with [facility]. Due to recheck. Diarrhea has resolved (back to baseline of looser stools but not diarrhea), back to more normal diet  - CHF/atrial flutter/tachycardia, history of cardiogrenic shock: due to follow up with cardiology  - CKD: discussed. Will recheck today. Patient is interested in seeing nephrology to further evaluate the cause, we did discuss her medications/htn/other medical conditions can affect this. It is mild.  - Lymphoma: follows with BMT  - iatrogenic cushingoid features/elevated glucose/osteoporosis: follows with endocrinology. Notified patient she does not have upcoming appointment scheduled. We also discussed their recommendations for glucose tolerance test. Will check A1C today but she will discuss with them.  - febrile neutropenia history: recent CBC does not show neutropenia  - lymphedema: prescription for new compression stockings written  - malnutrition: diet back to normal, most recent albumin normal.  - history of embolism: PICC line, 2017    Addendum: slightly more hyponatremic. I discussed today with cardiology RN, they will follow up with patient. I also wrote with patient to follow up with [facility]    Patient Instructions   Increase gabapentin to 100 mg AM + 100 mg afternoon + 200 mg bedtime    Trazodone: you can try 50 mg at night  If needed, can increase to 100 mg at night  Consider sleep clinic referral    Start zoloft 25 mg for 3-7 days, then increase to 50 mg daily  Consider therapy    If you notice any signs of serotonin syndrome be sure to be evaluated right away - I think we should be ok with these dosages, but there still is some risk as we discussed. Symptoms of too  much serotonin include: muscle twitching, confusion, agitation, weakness, insomnia, fever, sweats.    45 minutes spent on the date of the encounter doing chart review, history and exam, documentation and further activities per the note      Depression Screening Follow Up    PHQ 6/8/2022   PHQ-9 Total Score 17   Q9: Thoughts of better off dead/self-harm past 2 weeks Several days   F/U: Thoughts of suicide or self-harm No   F/U: Safety concerns No     Follow Up  Follow Up Actions Taken  Crisis resource information provided in the After Visit Summary  Patient declined referral.  medication start    Return in about 1 month (around 7/8/2022).    Gala Stringer PA-C  [facility] ELY Cisneros is a 61 year old who presents for the following health issues     History of Present Illness       Mental Health Follow-up:  Patient presents to follow-up on Depression & Anxiety.Patient's depression since last visit has been:  Bad  The patient is not having other symptoms associated with depression.  Patient's anxiety since last visit has been:  Bad  The patient is not having other symptoms associated with anxiety.  Any significant life events: health concerns  Patient is feeling anxious or having panic attacks.  Patient has no concerns about alcohol or drug use.    Amelia Michel eats 2-3 servings of fruits and vegetables daily.Amelia Michel consumes 0 sweetened beverage(s) daily.Amelia Michel exercises with enough effort to increase Amelia Michel's heart rate 9 or less minutes per day.  Amelia Michel exercises with enough effort to increase Amelia Michel's heart rate 3 or less days per week.   Amelia Michel is taking medications regularly.    Today's PHQ-9         PHQ-9 Total Score: 17    PHQ-9 Q9 Thoughts of better off dead/self-harm past 2 weeks :   Several days  Thoughts of suicide or self harm: (P) No  Self-harm Plan:     Self-harm Action:       Safety concerns for self or  "others: (P) No    How difficult have these problems made it for you to do your work, take care of things at home, or get along with other people: Somewhat difficult  Today's JANUARY-7 Score: 13       * went back to BRAT diet for a week, and diarrhea resolved  She thinks had virus or COVID-19 that caused this - Diarrhea started a couple months ago  Normally has looser stools, but not diarrhea and is back at baseline  Drinking 3 bottles water daily (16 oz) at least  Some tea, stopped coffee  Back to eating a little more normally. Weight is up with the carbs in diet    Had been on prozac for a short time a long time ago. Has tried ambien.  Not sleeping well at all - takes a while to fall asleep, then she wakes up after a few hours and stays up a few hours then sleeps again a little bit. She has had interrupted sleep a long time but it is worsening.  Has been tested for sleep apnea and they monitored in hospital  Leg does bother her at night, would like to increase gabapentin - Due to insurance she decreased dosing  She is depressed dealing with her health concerns. She sometimes feels she is tired of dealing with these issues and would be better off not here, however she denies any SI, plans or thoughts to hurt herself.  Support system:   Self care: getting outside  She does not want to see a therapist at this time, does not want more appointments.    Review of Systems   Other than noted above, general, HEENT, respiratory, cardiac, MS, and gastrointestinal systems are negative.       Objective    /59   Pulse 70   Temp 97.9  F (36.6  C) (Tympanic)   Resp 17   Ht 1.448 m (4' 9\")   Wt 52.9 kg (116 lb 11.2 oz)   SpO2 98%   BMI 25.25 kg/m    Body mass index is 25.25 kg/m .  Physical Exam   GENERAL: healthy, alert and no distress  RESP: lungs clear to auscultation - no rales, rhonchi or wheezes  CV: regular rate and rhythm, normal S1 S2, no S3 or S4, no murmur, click or rub, no peripheral edema and peripheral " pulses strong  ABDOMEN: soft, nontender, no hepatosplenomegaly, no masses and bowel sounds normal  MS: no gross musculoskeletal defects noted, no edema - wearing compression   PSYCH: Alert and oriented times 3; speech- coherent , normal rate and volume; able to articulate logical thoughts, able to abstract reason, no tangential thoughts, no hallucinations or delusions, affect- sad at times during discussion    Results for orders placed or performed in visit on 06/08/22   Basic metabolic panel  (Ca, Cl, CO2, Creat, Gluc, K, Na, BUN)     Status: Abnormal   Result Value Ref Range    Sodium 129 (L) 133 - 144 mmol/L    Potassium 4.9 3.4 - 5.3 mmol/L    Chloride 96 94 - 109 mmol/L    Carbon Dioxide (CO2) 27 20 - 32 mmol/L    Anion Gap 6 3 - 14 mmol/L    Urea Nitrogen 42 (H) 7 - 30 mg/dL    Creatinine 1.14 0.52 - 1.25 mg/dL    Calcium 10.1 8.5 - 10.1 mg/dL    Glucose 89 70 - 99 mg/dL    GFR Estimate 55 (L) >60 mL/min/1.73m2    Narrative    The sex of this patient cannot be reliably determined based on discrepancies in demographics (legal sex, sex assigned at birth, gender identity).  Both male and female reference ranges are provided where applicable.  Careful evaluation of the patient s results as compared to the gender specific reference intervals is required in this setting.    Hemoglobin A1c     Status: Normal   Result Value Ref Range    Hemoglobin A1C 5.6 0.0 - 5.6 %

## 2022-06-09 ENCOUNTER — TELEPHONE (OUTPATIENT)
Dept: CARDIOLOGY | Facility: CLINIC | Age: 62
End: 2022-06-09
Payer: COMMERCIAL

## 2022-06-09 DIAGNOSIS — I50.33 ACUTE ON CHRONIC DIASTOLIC HEART FAILURE (H): ICD-10-CM

## 2022-06-09 PROBLEM — E24.2: Status: ACTIVE | Noted: 2022-06-09

## 2022-06-09 PROBLEM — T38.0X5A STEROID-INDUCED OSTEOPOROSIS: Status: ACTIVE | Noted: 2022-06-09

## 2022-06-09 PROBLEM — M81.8 STEROID-INDUCED OSTEOPOROSIS: Status: ACTIVE | Noted: 2022-06-09

## 2022-06-09 NOTE — TELEPHONE ENCOUNTER
[facility] Call Center    Phone Message    May a detailed message be left on voicemail: yes     Reason for Call: Other: Gala from [facility] would like a call back asap regarding lab  results      Action Taken: Message routed to:  [facility] ([facility]): Cardio    Travel Screening: Not Applicable

## 2022-06-13 RX ORDER — SPIRONOLACTONE 25 MG/1
25 TABLET ORAL DAILY
Qty: 90 TABLET | Refills: 3 | Status: SHIPPED | OUTPATIENT
Start: 2022-06-13 | End: 2022-11-03

## 2022-06-13 NOTE — TELEPHONE ENCOUNTER
Date: 6/13/2022    Time of Call: 9:43 AM     Diagnosis:  hyponatremia     [ VORB ] Ordering provider: Halle Vasquez NP    Order: decrease spironolactone to 25mg daily. Repeat BMP in 1 week.     Order received by: Nancy Mohamud Rn     Follow-up/additional notes: order placed. Contacted tolu via [employer].

## 2022-06-21 ENCOUNTER — PRE VISIT (OUTPATIENT)
Dept: CARDIOLOGY | Facility: CLINIC | Age: 62
End: 2022-06-21
Payer: COMMERCIAL

## 2022-06-21 DIAGNOSIS — I50.33 ACUTE ON CHRONIC DIASTOLIC HEART FAILURE (H): Primary | ICD-10-CM

## 2022-06-22 ENCOUNTER — LAB (OUTPATIENT)
Dept: LAB | Facility: CLINIC | Age: 62
End: 2022-06-22
Payer: COMMERCIAL

## 2022-06-22 ENCOUNTER — OFFICE VISIT (OUTPATIENT)
Dept: CARDIOLOGY | Facility: CLINIC | Age: 62
End: 2022-06-22
Attending: NURSE PRACTITIONER
Payer: COMMERCIAL

## 2022-06-22 VITALS
OXYGEN SATURATION: 99 % | HEIGHT: 58 IN | BODY MASS INDEX: 25.61 KG/M2 | WEIGHT: 122 LBS | HEART RATE: 83 BPM | SYSTOLIC BLOOD PRESSURE: 106 MMHG | DIASTOLIC BLOOD PRESSURE: 72 MMHG

## 2022-06-22 DIAGNOSIS — I50.33 ACUTE ON CHRONIC DIASTOLIC HEART FAILURE (H): ICD-10-CM

## 2022-06-22 DIAGNOSIS — N18.30 CHRONIC KIDNEY DISEASE, STAGE 3 (H): Primary | ICD-10-CM

## 2022-06-22 DIAGNOSIS — I50.33 ACUTE ON CHRONIC DIASTOLIC HEART FAILURE (H): Primary | ICD-10-CM

## 2022-06-22 LAB
ANION GAP SERPL CALCULATED.3IONS-SCNC: 8 MMOL/L (ref 3–14)
BUN SERPL-MCNC: 31 MG/DL (ref 7–30)
CALCIUM SERPL-MCNC: 9.9 MG/DL (ref 8.5–10.1)
CHLORIDE BLD-SCNC: 96 MMOL/L (ref 94–109)
CO2 SERPL-SCNC: 28 MMOL/L (ref 20–32)
CREAT SERPL-MCNC: 1.16 MG/DL (ref 0.52–1.25)
GFR SERPL CREATININE-BSD FRML MDRD: 53 ML/MIN/1.73M2
GLUCOSE BLD-MCNC: 112 MG/DL (ref 70–99)
POTASSIUM BLD-SCNC: 4.7 MMOL/L (ref 3.4–5.3)
SODIUM SERPL-SCNC: 132 MMOL/L (ref 133–144)

## 2022-06-22 PROCEDURE — 80048 BASIC METABOLIC PNL TOTAL CA: CPT | Performed by: PATHOLOGY

## 2022-06-22 PROCEDURE — G0463 HOSPITAL OUTPT CLINIC VISIT: HCPCS

## 2022-06-22 PROCEDURE — 36415 COLL VENOUS BLD VENIPUNCTURE: CPT | Performed by: PATHOLOGY

## 2022-06-22 PROCEDURE — 99214 OFFICE O/P EST MOD 30 MIN: CPT | Performed by: NURSE PRACTITIONER

## 2022-06-22 ASSESSMENT — PAIN SCALES - GENERAL: PAINLEVEL: NO PAIN (0)

## 2022-06-22 NOTE — NURSING NOTE
Chief Complaint   Patient presents with     Follow Up     61 year old with chronic diastolic heart failure presents for follow up with labs prior.           Vitals were taken and medications were reconciled.   Keaton Camacho, EMT  8:53 AM

## 2022-06-22 NOTE — NURSING NOTE
Diet: Patient instructed regarding a heart healthy diet, including discussion of reduced fat and sodium intake. Patient demonstrated understanding of this information and agreed to call with further questions or concerns.  Labs: Patient was given results of the laboratory testing obtained today.  Patient demonstrated understanding of this information and agreed to call with further questions or concerns.   Return Appointment: Patient given instructions regarding scheduling next clinic visit. Patient demonstrated understanding of this information and agreed to call with further questions or concerns.  Medication Change: Patient was educated regarding prescribed medication change, including discussion of the indication, administration, side effects, and when to report to MD or RN. Patient demonstrated understanding of this information and agreed to call with further questions or concerns.  Stop Lisinopril   Increased Lasix to 20 mg BID for 3 days  Patient stated Amelia Michel understood all health information given and agreed to call with further questions or concerns.   Pao Alston RN

## 2022-06-22 NOTE — PROGRESS NOTES
HPI:   Amelia Michel is a 60 year old adult with a past medical history including VSD s/p repair 1969, RA, HTN, Insomnia, Atrial Tachyarrhythmias, cardiac arrest, SSS s/p PPM 12/19, DLBCL, and exudative pericardial effusion. She presents for routine [facility] return.     Her cardiac history dates back to 5/17 with atrial tachyarrhythmias with LVEF 40-45%. She underwent repeat hospitalization 6/17 due to VF arrest in setting of normal angiogram and found to have DLBCL complicated by masses on the coronary cusps and LVOT. She underwent R-EPOCH one cycle and 5 subsequent cycles of R-CHOP completed in 12/17 with her course complicated by pericardial effusion treated with colcichine. She has struggled with recurrent arrhythmia issues. Prior Cardiac MRI from 5111-0486 with normal EF. Since our last appointment she has undergone repeat Echo consistent with findings of HFPEF with EF 50% with adequately controlled HR, persistent moderate to severe TR.     She complains of anxiety and restlessness leading to fatigue. She was started on Zoloft, but discontinued this due to side effects. She notes occasional palpitations, but notes they are tolerable. She complains of 12 lbs weight gain and LE edema. She denies fever, chills, lightheadedness, dizziness, chest pain, palpitations, SOB, MONTALVO, PND, nausea, vomiting, and diarrhea. She continues to follow a low sodium diet.      PAST MEDICAL HISTORY:  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     VSD (ventricular septal defect)        FAMILY HISTORY:  Family History   Problem Relation Age of Onset     Breast Cancer Mother      Hypertension Mother      Alzheimer Disease Mother      Cerebrovascular Disease Mother      Hypertension Father       Cerebrovascular Disease Father      Atrial fibrillation Father      Lymphoma Father      Prostate Cancer Father      Diabetes Paternal Grandmother      Alzheimer Disease Maternal Grandmother         likely     Arthritis Maternal Grandfather      Pneumonia Maternal Grandfather        SOCIAL HISTORY:  Social History     Socioeconomic History     Marital status:      Spouse name: Romeo Michel     Number of children: 0   Occupational History     Employer: [employer]   Tobacco Use     Smoking status: Never Smoker     Smokeless tobacco: Never Used   Substance and Sexual Activity     Alcohol use: Yes     Comment: none     Drug use: No   Other Topics Concern     Parent/sibling w/ CABG, MI or angioplasty before 65F 55M? No       CURRENT MEDICATIONS:  Outpatient Medications Prior to Visit   Medication Sig Dispense Refill     acetaminophen (TYLENOL) 500 MG tablet Take 500 mg by mouth every 6 hours as needed for mild pain       alendronate (FOSAMAX) 70 MG tablet Take 1 tablet (70 mg) by mouth every 7 days 12 tablet 3     apixaban ANTICOAGULANT (ELIQUIS ANTICOAGULANT) 5 MG tablet Take 1 tablet (5 mg) by mouth 2 times daily 180 tablet 3     atenolol (TENORMIN) 25 MG tablet 50 mg in AM and 25 mg in the evening 270 tablet 4     calcium carbonate 600 mg-vitamin D 400 units (CALTRATE) 600-400 MG-UNIT per tablet Take 2 tablets by mouth daily       digoxin (LANOXIN) 125 MCG tablet Take 1 tablet (125 mcg) by mouth daily 90 tablet 3     furosemide (LASIX) 20 MG tablet Take 1 tablet (20 mg) by mouth daily 90 tablet 3     gabapentin (NEURONTIN) 100 MG capsule Take 1 capsule AM + 1 capsule afternoon + 2 capsules at bedtime 360 capsule 1     hydroxychloroquine (PLAQUENIL) 200 MG tablet Take 1 tablet (200 mg) by mouth daily 90 tablet 3     lisinopril (ZESTRIL) 2.5 MG tablet Take 1 tablet (2.5 mg) by mouth daily 90 tablet 3     loratadine (CLARITIN) 10 MG tablet Take 10 mg by mouth daily       multivitamin,  "therapeutic with minerals (THERA-VIT-M) TABS tablet Take 1 tablet by mouth daily 30 each 0     predniSONE (DELTASONE) 1 MG tablet Take 3 tablets (3 mg) by mouth daily 270 tablet 3     spironolactone (ALDACTONE) 25 MG tablet Take 1 tablet (25 mg) by mouth daily 90 tablet 3     sertraline (ZOLOFT) 50 MG tablet Take 0.5 tablets (25 mg) by mouth daily for 7 days, THEN 1 tablet (50 mg) daily for 30 days. 34 tablet 0     STATIN NOT PRESCRIBED (INTENTIONAL) Please choose reason not prescribed, below (Patient not taking: No sig reported)       traZODone (DESYREL) 50 MG tablet Take 1-2 tablets ( mg) by mouth At Bedtime 60 tablet 0     No facility-administered medications prior to visit.       ROS:   CONSTITUTIONAL: Denies fever, chills, fatigue. She complains of 12 lb weight gain.   HEENT: Denies headache, vision changes, and changes in speech.   CV: Refer to HPI.   PULMONARY:Denies shortness of breath, cough, or previous TB exposure.   GI:Denies nausea, vomiting, diarrhea, and abdominal pain. Bowel movements are regular. Complains of mild abdominal bloating.   :Denies urinary alterations, dysuria, urinary frequency, hematuria, and abnormal drainage.   EXT: Complains of lower extremity edema.   SKIN:Denies abnormal rashes or lesions.   MUSCULOSKELETAL:Denies upper or lower extremity weakness and pain.   NEUROLOGIC:Denies lightheadedness, dizziness, seizures, or upper or lower extremity paresthesia.     EXAM:  /72 (BP Location: Right arm, Patient Position: Chair, Cuff Size: Adult Regular)   Pulse 83   Ht 1.473 m (4' 10\")   Wt 55.3 kg (122 lb)   SpO2 99%   BMI 25.50 kg/m    GENERAL: Appears alert and oriented times three.   HEENT: Eye symmetrical and free of discharge bilaterally. Mucous membranes moist and without lesions.  NECK: Supple and without lymphadenopathy. JVD mid neck.   CV: RRR, S1S2 present without murmur, rub, or gallop.   RESPIRATORY: Respirations regular, even, and unlabored. Lungs CTA " throughout.   GI: Soft and non distended with normoactive bowel sounds present in all quadrants. No tenderness, rebound, guarding. No organomegaly.   EXTREMITIES: Trace bilateral LE peripheral edema. 2+ bilateral pedal pulses.   NEUROLOGIC: Alert and orientated x 3. CN II-XII grossly intact. No focal deficits.   MUSCULOSKELETAL: No joint swelling or tenderness.   SKIN: No jaundice. No rashes or lesions.     The following Labs were reviewed today:  CBC RESULTS:  Lab Results   Component Value Date    WBC 4.4 04/27/2022    WBC Canceled, Test credited 03/16/2021    RBC 3.66 (L) 04/27/2022    RBC Canceled, Test credited 03/16/2021    HGB 12.9 04/27/2022    HGB Canceled, Test credited 03/16/2021    HCT 37.6 04/27/2022    HCT Canceled, Test credited 03/16/2021     (H) 04/27/2022    MCV Canceled, Test credited 03/16/2021    MCH 35.2 (H) 04/27/2022    MCH Canceled, Test credited 03/16/2021    MCHC 34.3 04/27/2022    MCHC Canceled, Test credited 03/16/2021    RDW 13.7 04/27/2022    RDW Canceled, Test credited 03/16/2021    PLT 87 (L) 04/27/2022    PLT Canceled, Test credited 03/16/2021       CMP RESULTS:  Lab Results   Component Value Date     (L) 06/22/2022     06/23/2021    POTASSIUM 4.7 06/22/2022    POTASSIUM 4.2 06/23/2021    CHLORIDE 96 06/22/2022    CHLORIDE 102 06/23/2021    CO2 28 06/22/2022    CO2 28 06/23/2021    ANIONGAP 8 06/22/2022    ANIONGAP 6 06/23/2021     (H) 06/22/2022    GLC 98 06/23/2021    BUN 31 (H) 06/22/2022    BUN 25 06/23/2021    CR 1.16 06/22/2022    CR 1.05 (H) 06/23/2021    GFRESTIMATED 53 (L) 06/22/2022    GFRESTIMATED 57 (L) 06/23/2021    GFRESTBLACK 67 06/23/2021    VIKTORIYA 9.9 06/22/2022    VIKTORIYA 10.0 06/23/2021    BILITOTAL 1.3 09/16/2021    BILITOTAL 1.2 06/23/2021    ALBUMIN 4.4 04/20/2022    ALBUMIN 4.2 06/23/2021    ALKPHOS 133 09/16/2021    ALKPHOS 136 06/23/2021    ALT 44 04/20/2022    ALT 41 06/23/2021    AST 38 04/20/2022    AST 36 06/23/2021        INR  RESULTS:  Lab Results   Component Value Date    INR 3.42 (H) 03/07/2018       Lab Results   Component Value Date    MAG 1.9 04/12/2018     Lab Results   Component Value Date    NTBNPI 485 12/12/2019     Lab Results   Component Value Date    NTBNP 1,425 (H) 10/13/2021    NTBNP 1,274 (H) 05/26/2021       Diagnostics:  Echo 10/9/20:   Interpretation Summary  VSD s/p repair in 1969  Global and regional left ventricular function is normal with an EF of 60-65%.  No flow acceleration is seen across the septum.  Global right ventricular function is normal. Mild right ventricular dilation  is present.  There is moderate tricuspid regurgitation.  The estimated PA systolic pressure is 32 mmHg but this is likely to be  underestimated due to the presence of multiple jets.  This study was compared with the study from 09/11/2017. There has been no  change in the severity of the tricuspid regurgitation or the RV size/function.     Device check 5/26:  Device: [employer] [id number] Claudia XT DR GALLEGOS  Normal device function  Mode: AAIR<=>DDDR 60 - 130 bpm  AP: 0.8%  : 2.8%  Intrinsic Rhythm: AFL w/ VS 85 - 125 bpm  Short V-V intervals: none  Lead Trends Appear Stable: yes  Estimated battery longevity to RRT = 13.3 years.   AF [place] = 90.9%  Anticoagulant: Eliquis  Ventricular Arrhythmia: no new episodes recorded since last remote transmission on 5/18/2021, 1 episode that was recorded as SVT on May 2, 2021 displays what appears to be VT lasting 21 sec @ 200 bpm. Reviewed with Dr. Eaton.   Setting Changes: none     Echo 6/16/21:   Interpretation Summary  H/O VSD repair in 1969.  LVEF 50% based on biplane 2D tracing.  Diastolic function not assessed due to atrial fibrillation.  Global right ventricular function is mildly reduced.  Severe biatrial enlargement is present.  Moderate to severe tricuspid insufficiency is present.  Pulmonary artery systolic pressure is normal.  The inferior vena cava is normal.  No pericardial effusion is  present.    Echo 8/25/21:   Interpretation Summary  H/O of VSD repair in 1969  The visual ejection fraction is 60-65%. No wall motion abnormalities. Global  right ventricular function is mildly reduced.  Moderate tricuspid insufficiency is present.  There is a right ventricular right atrial pressure difference of 24mmHg.  IVC diameter >2.1 cm collapsing <50% with sniff suggests a high RA pressure  estimated at 15 mmHg or greater.  No pericardial effusion is present.     Assessment and Plan:   Marilu is a 60 year old adult with a past medical history including VSD s/p repair 1969, RA, HTN, Insomnia, Atrial Tachyarrhythmias, cardiac arrest, SSS s/p PPM 12/19, DLBCL, and exudative pericardial effusion. She presents to [facility] clinic for routine follow up with mild hypervolemia.      HFpEF. Note prior echo with reduced EF with uncontrolled HR likely inaccurate representation of EF. Findings on Echo with controlled HR consistent with HFpEF.   NYHA Class III, AHA/ACC Stage C  Rate control: 83  volume status: Hypervolemic, note moderate TR on echo. Increased Lasix to 20 mg po BID for 3 days.   Blood pressure control: Controlled  Aldosterone antagonist: Aldactone 25 mg po daily  Evaluation of coronary arteries: 6/17 NCA per Angiogram     HTN. Intolerant to Metoprolol in the past.   - Atenolol 50 mg in AM and 25 mg in the evening.   - Discontinue Lisinopril.      Aflutter. History of SSS s/p PPM 12/19. Long standing history of atrial arrythmias.  - Continue Digoxin 125 mcg po daily, level 1.6 4/20/22.  - Atenolol as above.   - Continue Eliquis.   - Follow up with EP as scheduled.      VSD s/p repair.      History of exudative pericardial effusion.   - Stable per CT 3/19.     Moderate to severe TR. Stable per follow up Echo 8/25/21.   - Continue to monitor symptoms.      Follow up symptom check in 3 days, BP log in 1 week, and [facility] in 1 month.       Halle Boyd, ELIZABETH CNP  6/22/2022          CC  HALLE BOYD

## 2022-06-22 NOTE — PATIENT INSTRUCTIONS
Take your medicines every day, as directed    Changes made today:  Increase Lasix to 20 mg by mouth twice a day for 3 days, then go back to maintenance dose   Stop Lisinopril    Monitor Your Weight and Symptoms    Contact us if you:    Gain 2 pounds in one day or 5 pounds in one week  Feel more short of breath  Notice more leg swelling  Feel lightheadeded   Change your lifestyle    Limit Salt or Sodium:  2000 mg  Limit Fluids:  2000 mL or approximately 64 ounces  Eat a Heart Healthy Diet  Low in saturated fats  Stay Active:  Aim to move at least 150 minutes every  week         To Contact us    During Business Hours:  [phone], option # 1      After hours, weekends or holidays:   [phone], Option #4  Ask to speak to the On-Call Cardiologist. Inform them you are a CORE/heart failure patient at the [facility].     Use [employer] allows you to communicate directly with your heart team through secure messaging.  [employer] can be accessed any time on your phone, computer, or tablet.  If you need assistance, we'd be happy to help!         Keep your Heart Appointments:    RN call after 3 day increase to check on symptoms   BP log in one week  Follow up in [facility] in one month

## 2022-06-28 ENCOUNTER — PATIENT OUTREACH (OUTPATIENT)
Dept: CARDIOLOGY | Facility: CLINIC | Age: 62
End: 2022-06-28

## 2022-06-28 DIAGNOSIS — I50.33 ACUTE ON CHRONIC DIASTOLIC HEART FAILURE (H): Primary | ICD-10-CM

## 2022-06-28 NOTE — TELEPHONE ENCOUNTER
Reviewed with Halle Vasquez NP.   Date: 6/28/2022    Time of Call: 1:44 PM     Diagnosis:  HF     [ VORB ] Ordering provider: Halle Vasquez NP   Order: BMP      Order received by: Pao Alston RN      Follow-up/additional notes:

## 2022-07-06 ENCOUNTER — LAB (OUTPATIENT)
Dept: LAB | Facility: CLINIC | Age: 62
End: 2022-07-06
Payer: COMMERCIAL

## 2022-07-06 DIAGNOSIS — N18.30 CHRONIC KIDNEY DISEASE, STAGE 3 (H): ICD-10-CM

## 2022-07-06 DIAGNOSIS — I50.33 ACUTE ON CHRONIC DIASTOLIC HEART FAILURE (H): ICD-10-CM

## 2022-07-06 LAB
ANION GAP SERPL CALCULATED.3IONS-SCNC: 13 MMOL/L (ref 7–15)
BUN SERPL-MCNC: 20.1 MG/DL (ref 8–23)
CALCIUM SERPL-MCNC: 9.4 MG/DL (ref 8.8–10.2)
CHLORIDE SERPL-SCNC: 97 MMOL/L (ref 98–107)
CREAT SERPL-MCNC: 1.08 MG/DL (ref 0.51–1.17)
CREAT UR-MCNC: 8.1 MG/DL
DEPRECATED HCO3 PLAS-SCNC: 24 MMOL/L (ref 22–29)
GFR SERPL CREATININE-BSD FRML MDRD: 58 ML/MIN/1.73M2
GLUCOSE SERPL-MCNC: 111 MG/DL (ref 70–99)
MICROALBUMIN UR-MCNC: <12 MG/L
MICROALBUMIN/CREAT UR: NORMAL MG/G{CREAT}
POTASSIUM SERPL-SCNC: 4 MMOL/L (ref 3.4–5.3)
SODIUM SERPL-SCNC: 134 MMOL/L (ref 136–145)

## 2022-07-06 PROCEDURE — 80048 BASIC METABOLIC PNL TOTAL CA: CPT

## 2022-07-06 PROCEDURE — 82043 UR ALBUMIN QUANTITATIVE: CPT

## 2022-07-06 PROCEDURE — 36415 COLL VENOUS BLD VENIPUNCTURE: CPT

## 2022-07-14 DIAGNOSIS — I50.22 CHRONIC SYSTOLIC HEART FAILURE (H): Primary | ICD-10-CM

## 2022-07-19 NOTE — PROGRESS NOTES
HPI:   Amelia Michel is a 61 year old adult with a past medical history including VSD s/p repair 1969, RA, HTN, Insomnia, Atrial Tachyarrhythmias, cardiac arrest, SSS s/p PPM 12/19, DLBCL, and exudative pericardial effusion. She presents for routine CORE return.     Her cardiac history dates back to 5/17 with atrial tachyarrhythmias with LVEF 40-45%. She underwent repeat hospitalization 6/17 due to VF arrest in setting of normal angiogram and found to have DLBCL complicated by masses on the coronary cusps and LVOT. She underwent R-EPOCH one cycle and 5 subsequent cycles of R-CHOP completed in 12/17 with her course complicated by pericardial effusion treated with colcichine. She has struggled with recurrent arrhythmia issues. Prior Cardiac MRI from 5036-0605 with normal EF. Since our last appointment she has undergone repeat Echo consistent with findings of HFPEF with EF 50% with adequately controlled HR, persistent moderate to severe TR.     She continues to complain of fatigue, dizziness, and MONTALVO due to the humidity and her mask. She denies fever, chills, chest pain, palpitations, PND, orthopnea, nausea, vomiting, diarrhea, hematochezia, melena. She notes RLE edema, but attributes this to her ankle injury. Her weight remains stable at 119 lbs. She continues to follow a low sodium diet.        PAST MEDICAL HISTORY:  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     VSD (ventricular septal defect)        FAMILY HISTORY:  Family History   Problem Relation Age of Onset     Breast Cancer Mother      Hypertension Mother      Alzheimer Disease Mother      Cerebrovascular Disease Mother      Hypertension Father      Cerebrovascular Disease Father      Atrial fibrillation Father       Lymphoma Father      Prostate Cancer Father      Diabetes Paternal Grandmother      Alzheimer Disease Maternal Grandmother         likely     Arthritis Maternal Grandfather      Pneumonia Maternal Grandfather        SOCIAL HISTORY:  Social History     Socioeconomic History     Marital status:      Spouse name: Romeo Michel     Number of children: 0   Occupational History     Employer: [employer]   Tobacco Use     Smoking status: Never Smoker     Smokeless tobacco: Never Used   Substance and Sexual Activity     Alcohol use: Yes     Comment: none     Drug use: No   Other Topics Concern     Parent/sibling w/ CABG, MI or angioplasty before 65F 55M? No       CURRENT MEDICATIONS:  Outpatient Medications Prior to Visit   Medication Sig Dispense Refill     acetaminophen (TYLENOL) 500 MG tablet Take 500 mg by mouth every 6 hours as needed for mild pain       alendronate (FOSAMAX) 70 MG tablet Take 1 tablet (70 mg) by mouth every 7 days 12 tablet 3     atenolol (TENORMIN) 25 MG tablet 50 mg in AM and 25 mg in the evening 270 tablet 4     calcium carbonate 600 mg-vitamin D 400 units (CALTRATE) 600-400 MG-UNIT per tablet Take 2 tablets by mouth daily       digoxin (LANOXIN) 125 MCG tablet Take 1 tablet (125 mcg) by mouth daily 90 tablet 3     furosemide (LASIX) 20 MG tablet Take 1 tablet (20 mg) by mouth daily 90 tablet 3     gabapentin (NEURONTIN) 100 MG capsule Take 1 capsule AM + 1 capsule afternoon + 2 capsules at bedtime 360 capsule 1     hydroxychloroquine (PLAQUENIL) 200 MG tablet Take 1 tablet (200 mg) by mouth daily 90 tablet 3     loratadine (CLARITIN) 10 MG tablet Take 10 mg by mouth daily       multivitamin, therapeutic with minerals (THERA-VIT-M) TABS tablet Take 1 tablet by mouth daily 30 each 0     predniSONE (DELTASONE) 1 MG tablet Take 3 tablets (3 mg) by mouth daily 270 tablet 3     spironolactone (ALDACTONE) 25 MG tablet Take 1 tablet (25 mg) by mouth daily 90 tablet 3      "sertraline (ZOLOFT) 50 MG tablet Take 0.5 tablets (25 mg) by mouth daily for 7 days, THEN 1 tablet (50 mg) daily for 30 days. 34 tablet 0     STATIN NOT PRESCRIBED (INTENTIONAL) Please choose reason not prescribed, below (Patient not taking: No sig reported)       traZODone (DESYREL) 50 MG tablet Take 1-2 tablets ( mg) by mouth At Bedtime 60 tablet 0     apixaban ANTICOAGULANT (ELIQUIS ANTICOAGULANT) 5 MG tablet Take 1 tablet (5 mg) by mouth 2 times daily 180 tablet 3     No facility-administered medications prior to visit.       ROS:   CONSTITUTIONAL: Denies fever, chills, or weight fluctuations. Complains of fatigue.   HEENT: Denies headache, vision changes, and changes in speech.   CV: Refer to HPI.   PULMONARY: Complains of MONTALVO.  GI:Denies nausea, vomiting, diarrhea, and abdominal pain. Bowel movements are regular.   :Denies urinary alterations, dysuria, urinary frequency, hematuria, and abnormal drainage.   EXT: Complains of right lower extremity edema.   SKIN:Denies abnormal rashes or lesions.   MUSCULOSKELETAL:Denies upper or lower extremity weakness and pain.   NEUROLOGIC: Complains of dizziness.     EXAM:  /79 (BP Location: Right arm, Patient Position: Chair, Cuff Size: Adult Regular)   Pulse 86   Ht 1.454 m (4' 9.24\")   Wt 54.1 kg (119 lb 4.8 oz)   SpO2 96%   BMI 25.60 kg/m    GENERAL: Appears alert and oriented times three.   HEENT: Eye symmetrical and free of discharge bilaterally. Mucous membranes moist and without lesions.  NECK: Supple and without lymphadenopathy. JVD 8 cm.   CV: RRR, S1S2 present with IV/VI murmur heard best at LSB  RESPIRATORY: Respirations regular, even, and unlabored. Lungs CTA throughout.   GI: Soft and non distended with normoactive bowel sounds present in all quadrants. No tenderness, rebound, guarding. No organomegaly.   EXTREMITIES: Trace right ankle peripheral edema. 2+ bilateral pedal pulses.   NEUROLOGIC: Alert and orientated x 3. CN II-XII grossly " intact. No focal deficits.   MUSCULOSKELETAL: No joint swelling or tenderness.   SKIN: No jaundice. No rashes or lesions.     The following Labs were reviewed today:  CBC RESULTS:  Lab Results   Component Value Date    WBC 4.4 04/27/2022    WBC Canceled, Test credited 03/16/2021    RBC 3.66 (L) 04/27/2022    RBC Canceled, Test credited 03/16/2021    HGB 12.9 04/27/2022    HGB Canceled, Test credited 03/16/2021    HCT 37.6 04/27/2022    HCT Canceled, Test credited 03/16/2021     (H) 04/27/2022    MCV Canceled, Test credited 03/16/2021    MCH 35.2 (H) 04/27/2022    MCH Canceled, Test credited 03/16/2021    MCHC 34.3 04/27/2022    MCHC Canceled, Test credited 03/16/2021    RDW 13.7 04/27/2022    RDW Canceled, Test credited 03/16/2021    PLT 87 (L) 04/27/2022    PLT Canceled, Test credited 03/16/2021       CMP RESULTS:  Lab Results   Component Value Date     07/20/2022     06/23/2021    POTASSIUM 3.9 07/20/2022    POTASSIUM 4.2 06/23/2021    CHLORIDE 101 07/20/2022    CHLORIDE 102 06/23/2021    CO2 28 07/20/2022    CO2 28 06/23/2021    ANIONGAP 7 07/20/2022    ANIONGAP 6 06/23/2021     (H) 07/20/2022    GLC 98 06/23/2021    BUN 31 (H) 07/20/2022    BUN 25 06/23/2021    CR 1.09 07/20/2022    CR 1.05 (H) 06/23/2021    GFRESTIMATED 58 (L) 07/20/2022    GFRESTIMATED 57 (L) 06/23/2021    GFRESTBLACK 67 06/23/2021    VIKTORIYA 9.3 07/20/2022    VIKTORIYA 10.0 06/23/2021    BILITOTAL 1.3 09/16/2021    BILITOTAL 1.2 06/23/2021    ALBUMIN 4.4 04/20/2022    ALBUMIN 4.2 06/23/2021    ALKPHOS 133 09/16/2021    ALKPHOS 136 06/23/2021    ALT 44 04/20/2022    ALT 41 06/23/2021    AST 38 04/20/2022    AST 36 06/23/2021        INR RESULTS:  Lab Results   Component Value Date    INR 3.42 (H) 03/07/2018       Lab Results   Component Value Date    MAG 1.9 04/12/2018     Lab Results   Component Value Date    NTBNPI 485 12/12/2019     Lab Results   Component Value Date    NTBNP 1,425 (H) 10/13/2021    NTBNP 1,274 (H) 05/26/2021        Diagnostics:  Echo 10/9/20:   Interpretation Summary  VSD s/p repair in 1969  Global and regional left ventricular function is normal with an EF of 60-65%.  No flow acceleration is seen across the septum.  Global right ventricular function is normal. Mild right ventricular dilation  is present.  There is moderate tricuspid regurgitation.  The estimated PA systolic pressure is 32 mmHg but this is likely to be  underestimated due to the presence of multiple jets.  This study was compared with the study from 09/11/2017. There has been no  change in the severity of the tricuspid regurgitation or the RV size/function.     Device check 5/26:  Device: [employer] [id number] Claudia XT DR GALLEGOS  Normal device function  Mode: AAIR<=>DDDR 60 - 130 bpm  AP: 0.8%  : 2.8%  Intrinsic Rhythm: AFL w/ VS 85 - 125 bpm  Short V-V intervals: none  Lead Trends Appear Stable: yes  Estimated battery longevity to RRT = 13.3 years.   AF [place] = 90.9%  Anticoagulant: Eliquis  Ventricular Arrhythmia: no new episodes recorded since last remote transmission on 5/18/2021, 1 episode that was recorded as SVT on May 2, 2021 displays what appears to be VT lasting 21 sec @ 200 bpm. Reviewed with Dr. Eaton.   Setting Changes: none     Echo 6/16/21:   Interpretation Summary  H/O VSD repair in 1969.  LVEF 50% based on biplane 2D tracing.  Diastolic function not assessed due to atrial fibrillation.  Global right ventricular function is mildly reduced.  Severe biatrial enlargement is present.  Moderate to severe tricuspid insufficiency is present.  Pulmonary artery systolic pressure is normal.  The inferior vena cava is normal.  No pericardial effusion is present.     Echo 8/25/21:   Interpretation Summary  H/O of VSD repair in 1969  The visual ejection fraction is 60-65%. No wall motion abnormalities. Global  right ventricular function is mildly reduced.  Moderate tricuspid insufficiency is present.  There is a right ventricular right atrial pressure  difference of 24mmHg.  IVC diameter >2.1 cm collapsing <50% with sniff suggests a high RA pressure  estimated at 15 mmHg or greater.  No pericardial effusion is present.     Assessment and Plan:   Marilu is a 60 year old adult with a past medical history including VSD s/p repair 1969, RA, HTN, Insomnia, Atrial Tachyarrhythmias, cardiac arrest, SSS s/p PPM 12/19, DLBCL, and exudative pericardial effusion. She presents to CORE clinic for routine follow up with stable BP at euvolemic state.      HFpEF. Note prior echo with reduced EF with uncontrolled HR likely inaccurate representation of EF. Findings on Echo with controlled HR consistent with HFpEF.   NYHA Class III, AHA/ACC Stage C  Rate control: 88  volume status: Euvolemic  Blood pressure control: Controlled during her clinic visits dating back to 4/22. Recommended she correlate her home BP cuff as this is running higher.   Aldosterone antagonist: Aldactone 25 mg po daily  Evaluation of coronary arteries: 6/17 NCA per Angiogram     HTN. Intolerant to Metoprolol in the past.   - Atenolol 50 mg in AM and 25 mg in the evening.   - Controlled during her clinic visits dating back to 4/22. Recommended she correlate her home BP cuff as this is running higher.      Aflutter. History of SSS s/p PPM 12/19. Long standing history of atrial arrythmias.  - Continue Digoxin 125 mcg po daily, level 1.6 4/20/22.  - Atenolol as above.   - Continue Eliquis.   - Follow up with EP as scheduled.   - Dig level obtained per her request.      VSD s/p repair.      History of exudative pericardial effusion.   - Stable per CT 3/19.     Moderate to severe TR. Stable per follow up Echo 8/25/21.   - Continue to monitor symptoms, Echo in 1 month as scheduled    Follow up with Dr. Eaton as scheduled with Echo prior. BP log and correlation in 1-2 weeks.       Halle Vasquez, APRN CNP  7/19/2022          CC  SELF, REFERRED

## 2022-07-20 ENCOUNTER — LAB (OUTPATIENT)
Dept: LAB | Facility: CLINIC | Age: 62
End: 2022-07-20
Payer: COMMERCIAL

## 2022-07-20 ENCOUNTER — OFFICE VISIT (OUTPATIENT)
Dept: CARDIOLOGY | Facility: CLINIC | Age: 62
End: 2022-07-20
Attending: NURSE PRACTITIONER
Payer: COMMERCIAL

## 2022-07-20 VITALS
OXYGEN SATURATION: 96 % | DIASTOLIC BLOOD PRESSURE: 79 MMHG | WEIGHT: 119.3 LBS | BODY MASS INDEX: 25.74 KG/M2 | HEIGHT: 57 IN | SYSTOLIC BLOOD PRESSURE: 116 MMHG | HEART RATE: 86 BPM

## 2022-07-20 DIAGNOSIS — I50.22 CHRONIC SYSTOLIC HEART FAILURE (H): ICD-10-CM

## 2022-07-20 DIAGNOSIS — I50.33 ACUTE ON CHRONIC DIASTOLIC HEART FAILURE (H): ICD-10-CM

## 2022-07-20 DIAGNOSIS — I48.0 PAROXYSMAL ATRIAL FIBRILLATION (H): ICD-10-CM

## 2022-07-20 LAB
ANION GAP SERPL CALCULATED.3IONS-SCNC: 7 MMOL/L (ref 3–14)
BUN SERPL-MCNC: 31 MG/DL (ref 7–30)
CALCIUM SERPL-MCNC: 9.3 MG/DL (ref 8.5–10.1)
CHLORIDE BLD-SCNC: 101 MMOL/L (ref 94–109)
CO2 SERPL-SCNC: 28 MMOL/L (ref 20–32)
CREAT SERPL-MCNC: 1.09 MG/DL (ref 0.52–1.25)
DIGOXIN SERPL-MCNC: 1.1 NG/ML (ref 0.6–2)
GFR SERPL CREATININE-BSD FRML MDRD: 58 ML/MIN/1.73M2
GLUCOSE BLD-MCNC: 107 MG/DL (ref 70–99)
HOLD SPECIMEN: NORMAL
POTASSIUM BLD-SCNC: 3.9 MMOL/L (ref 3.4–5.3)
SODIUM SERPL-SCNC: 136 MMOL/L (ref 133–144)

## 2022-07-20 PROCEDURE — 99214 OFFICE O/P EST MOD 30 MIN: CPT | Performed by: NURSE PRACTITIONER

## 2022-07-20 PROCEDURE — 80048 BASIC METABOLIC PNL TOTAL CA: CPT | Performed by: PATHOLOGY

## 2022-07-20 PROCEDURE — 80162 ASSAY OF DIGOXIN TOTAL: CPT | Performed by: NURSE PRACTITIONER

## 2022-07-20 PROCEDURE — 36415 COLL VENOUS BLD VENIPUNCTURE: CPT | Performed by: PATHOLOGY

## 2022-07-20 PROCEDURE — G0463 HOSPITAL OUTPT CLINIC VISIT: HCPCS

## 2022-07-20 ASSESSMENT — PAIN SCALES - GENERAL: PAINLEVEL: NO PAIN (0)

## 2022-07-20 NOTE — PATIENT INSTRUCTIONS
Take your medicines every day, as directed    Changes made today:  No changes today     Monitor Your Weight and Symptoms    Contact us if you:    Gain 2 pounds in one day or 5 pounds in one week  Feel more short of breath  Notice more leg swelling  Feel lightheadeded   Change your lifestyle    Limit Salt or Sodium:  2000 mg  Limit Fluids:  2000 mL or approximately 64 ounces  Eat a Heart Healthy Diet  Low in saturated fats  Stay Active:  Aim to move at least 150 minutes every  week         To Contact us    During Business Hours:  [phone], option # 1      After hours, weekends or holidays:   [phone], Option #4  Ask to speak to the On-Call Cardiologist. Inform them you are a CORE/heart failure patient at the [facility].     Use [employer] allows you to communicate directly with your heart team through secure messaging.  [employer] can be accessed any time on your phone, computer, or tablet.  If you need assistance, we'd be happy to help!         Keep your Heart Appointments:    Follow up with Dr. Eaton as scheduled.

## 2022-07-20 NOTE — LETTER
7/20/2022      RE: Amelia Michel  [address]  [facility] [address]       Dear Colleague,    Thank you for the opportunity to participate in the care of your patient, Amelia Michel, at the [facility] [place] [facility]. Please see a copy of my visit note below.    HPI:   Amelia Michel is a 61 year old adult with a past medical history including VSD s/p repair 1969, RA, HTN, Insomnia, Atrial Tachyarrhythmias, cardiac arrest, SSS s/p PPM 12/19, DLBCL, and exudative pericardial effusion. She presents for routine CORE return.     Her cardiac history dates back to 5/17 with atrial tachyarrhythmias with LVEF 40-45%. She underwent repeat hospitalization 6/17 due to VF arrest in setting of normal angiogram and found to have DLBCL complicated by masses on the coronary cusps and LVOT. She underwent R-EPOCH one cycle and 5 subsequent cycles of R-CHOP completed in 12/17 with her course complicated by pericardial effusion treated with colcichine. She has struggled with recurrent arrhythmia issues. Prior Cardiac MRI from [id number] with normal EF. Since our last appointment she has undergone repeat Echo consistent with findings of HFPEF with EF 50% with adequately controlled HR, persistent moderate to severe TR.     She continues to complain of fatigue, dizziness, and MONTALVO due to the humidity and her mask. She denies fever, chills, chest pain, palpitations, PND, orthopnea, nausea, vomiting, diarrhea, hematochezia, melena. She notes RLE edema, but attributes this to her ankle injury. Her weight remains stable at 119 lbs. She continues to follow a low sodium diet.        PAST MEDICAL HISTORY:  Past Medical History:   Diagnosis Date     Arthritis      Cardiac arrest (H)      History of blood clots 2017    PICC line Right arm      History of chemotherapy      History of transfusion      Hypertension      Neuropathy      Physical deconditioning       Positive PPD, 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     VSD (ventricular septal defect)        FAMILY HISTORY:  Family History   Problem Relation Age of Onset     Breast Cancer Mother      Hypertension Mother      Alzheimer Disease Mother      Cerebrovascular Disease Mother      Hypertension Father      Cerebrovascular Disease Father      Atrial fibrillation Father      Lymphoma Father      Prostate Cancer Father      Diabetes Paternal Grandmother      Alzheimer Disease Maternal Grandmother         likely     Arthritis Maternal Grandfather      Pneumonia Maternal Grandfather        SOCIAL HISTORY:  Social History     Socioeconomic History     Marital status:      Spouse name: Romeo Michel     Number of children: 0   Occupational History     Employer: [employer]   Tobacco Use     Smoking status: Never Smoker     Smokeless tobacco: Never Used   Substance and Sexual Activity     Alcohol use: Yes     Comment: none     Drug use: No   Other Topics Concern     Parent/sibling w/ CABG, MI or angioplasty before 65F 55M? No       CURRENT MEDICATIONS:  Outpatient Medications Prior to Visit   Medication Sig Dispense Refill     acetaminophen (TYLENOL) 500 MG tablet Take 500 mg by mouth every 6 hours as needed for mild pain       alendronate (FOSAMAX) 70 MG tablet Take 1 tablet (70 mg) by mouth every 7 days 12 tablet 3     atenolol (TENORMIN) 25 MG tablet 50 mg in AM and 25 mg in the evening 270 tablet 4     calcium carbonate 600 mg-vitamin D 400 units (CALTRATE) 600-400 MG-UNIT per tablet Take 2 tablets by mouth daily       digoxin (LANOXIN) 125 MCG tablet Take 1 tablet (125 mcg) by mouth daily 90 tablet 3     furosemide (LASIX) 20 MG tablet Take 1 tablet (20 mg) by mouth daily 90 tablet 3     gabapentin (NEURONTIN) 100 MG capsule Take 1 capsule AM + 1 capsule afternoon + 2 capsules at bedtime 360 capsule 1     hydroxychloroquine (PLAQUENIL) 200 MG tablet Take 1 tablet (200 mg) by mouth daily 90 tablet 3     loratadine  "(CLARITIN) 10 MG tablet Take 10 mg by mouth daily       multivitamin, therapeutic with minerals (THERA-VIT-M) TABS tablet Take 1 tablet by mouth daily 30 each 0     predniSONE (DELTASONE) 1 MG tablet Take 3 tablets (3 mg) by mouth daily 270 tablet 3     spironolactone (ALDACTONE) 25 MG tablet Take 1 tablet (25 mg) by mouth daily 90 tablet 3     sertraline (ZOLOFT) 50 MG tablet Take 0.5 tablets (25 mg) by mouth daily for 7 days, THEN 1 tablet (50 mg) daily for 30 days. 34 tablet 0     STATIN NOT PRESCRIBED (INTENTIONAL) Please choose reason not prescribed, below (Patient not taking: No sig reported)       traZODone (DESYREL) 50 MG tablet Take 1-2 tablets ( mg) by mouth At Bedtime 60 tablet 0     apixaban ANTICOAGULANT (ELIQUIS ANTICOAGULANT) 5 MG tablet Take 1 tablet (5 mg) by mouth 2 times daily 180 tablet 3     No facility-administered medications prior to visit.       ROS:   CONSTITUTIONAL: Denies fever, chills, or weight fluctuations. Complains of fatigue.   HEENT: Denies headache, vision changes, and changes in speech.   CV: Refer to HPI.   PULMONARY: Complains of MONTALVO.  GI:Denies nausea, vomiting, diarrhea, and abdominal pain. Bowel movements are regular.   :Denies urinary alterations, dysuria, urinary frequency, hematuria, and abnormal drainage.   EXT: Complains of right lower extremity edema.   SKIN:Denies abnormal rashes or lesions.   MUSCULOSKELETAL:Denies upper or lower extremity weakness and pain.   NEUROLOGIC: Complains of dizziness.     EXAM:  /79 (BP Location: Right arm, Patient Position: Chair, Cuff Size: Adult Regular)   Pulse 86   Ht 1.454 m (4' 9.24\")   Wt 54.1 kg (119 lb 4.8 oz)   SpO2 96%   BMI 25.60 kg/m    GENERAL: Appears alert and oriented times three.   HEENT: Eye symmetrical and free of discharge bilaterally. Mucous membranes moist and without lesions.  NECK: Supple and without lymphadenopathy. JVD 8 cm.   CV: RRR, S1S2 present with IV/VI murmur heard best at " LSB  RESPIRATORY: Respirations regular, even, and unlabored. Lungs CTA throughout.   GI: Soft and non distended with normoactive bowel sounds present in all quadrants. No tenderness, rebound, guarding. No organomegaly.   EXTREMITIES: Trace right ankle peripheral edema. 2+ bilateral pedal pulses.   NEUROLOGIC: Alert and orientated x 3. CN II-XII grossly intact. No focal deficits.   MUSCULOSKELETAL: No joint swelling or tenderness.   SKIN: No jaundice. No rashes or lesions.     The following Labs were reviewed today:  CBC RESULTS:  Lab Results   Component Value Date    WBC 4.4 04/27/2022    WBC Canceled, Test credited 03/16/2021    RBC 3.66 (L) 04/27/2022    RBC Canceled, Test credited 03/16/2021    HGB 12.9 04/27/2022    HGB Canceled, Test credited 03/16/2021    HCT 37.6 04/27/2022    HCT Canceled, Test credited 03/16/2021     (H) 04/27/2022    MCV Canceled, Test credited 03/16/2021    MCH 35.2 (H) 04/27/2022    MCH Canceled, Test credited 03/16/2021    MCHC 34.3 04/27/2022    MCHC Canceled, Test credited 03/16/2021    RDW 13.7 04/27/2022    RDW Canceled, Test credited 03/16/2021    PLT 87 (L) 04/27/2022    PLT Canceled, Test credited 03/16/2021       CMP RESULTS:  Lab Results   Component Value Date     07/20/2022     06/23/2021    POTASSIUM 3.9 07/20/2022    POTASSIUM 4.2 06/23/2021    CHLORIDE 101 07/20/2022    CHLORIDE 102 06/23/2021    CO2 28 07/20/2022    CO2 28 06/23/2021    ANIONGAP 7 07/20/2022    ANIONGAP 6 06/23/2021     (H) 07/20/2022    GLC 98 06/23/2021    BUN 31 (H) 07/20/2022    BUN 25 06/23/2021    CR 1.09 07/20/2022    CR 1.05 (H) 06/23/2021    GFRESTIMATED 58 (L) 07/20/2022    GFRESTIMATED 57 (L) 06/23/2021    GFRESTBLACK 67 06/23/2021    VIKTORIYA 9.3 07/20/2022    VIKTORIYA 10.0 06/23/2021    BILITOTAL 1.3 09/16/2021    BILITOTAL 1.2 06/23/2021    ALBUMIN 4.4 04/20/2022    ALBUMIN 4.2 06/23/2021    ALKPHOS 133 09/16/2021    ALKPHOS 136 06/23/2021    ALT 44 04/20/2022    ALT 41  06/23/2021    AST 38 04/20/2022    AST 36 06/23/2021        INR RESULTS:  Lab Results   Component Value Date    INR 3.42 (H) 03/07/2018       Lab Results   Component Value Date    MAG 1.9 04/12/2018     Lab Results   Component Value Date    NTBNPI 485 12/12/2019     Lab Results   Component Value Date    NTBNP 1,425 (H) 10/13/2021    NTBNP 1,274 (H) 05/26/2021       Diagnostics:  Echo 10/9/20:   Interpretation Summary  VSD s/p repair in 1969  Global and regional left ventricular function is normal with an EF of 60-65%.  No flow acceleration is seen across the septum.  Global right ventricular function is normal. Mild right ventricular dilation  is present.  There is moderate tricuspid regurgitation.  The estimated PA systolic pressure is 32 mmHg but this is likely to be  underestimated due to the presence of multiple jets.  This study was compared with the study from 09/11/2017. There has been no  change in the severity of the tricuspid regurgitation or the RV size/function.     Device check 5/26:  Device: So [employer] Me [id number] Claudia XT DR GALLEGOS  Normal device function  Mode: AAIR<=>DDDR 60 - 130 bpm  AP: 0.8%  : 2.8%  Intrinsic Rhythm: AFL w/ VS 85 - 125 bpm  Short V-V intervals: none  Lead Trends Appear Stable: yes  Estimated battery longevity to RRT = 13.3 years.   AF Fitzpatrick = 90.9%  Anticoagulant: Eliquis  Ventricular Arrhythmia: no new episodes recorded since last remote transmission on 5/18/2021, 1 episode that was recorded as SVT on May 2, 2021 displays what appears to be VT lasting 21 sec @ 200 bpm. Reviewed with Dr. Eaton.   Setting Changes: none     Echo 6/16/21:   Interpretation Summary  H/O VSD repair in 1969.  LVEF 50% based on biplane 2D tracing.  Diastolic function not assessed due to atrial fibrillation.  Global right ventricular function is mildly reduced.  Severe biatrial enlargement is present.  Moderate to severe tricuspid insufficiency is present.  Pulmonary artery systolic pressure is normal.  The  inferior vena cava is normal.  No pericardial effusion is present.     Echo 8/25/21:   Interpretation Summary  H/O of VSD repair in 1969  The visual ejection fraction is 60-65%. No wall motion abnormalities. Global  right ventricular function is mildly reduced.  Moderate tricuspid insufficiency is present.  There is a right ventricular right atrial pressure difference of 24mmHg.  IVC diameter >2.1 cm collapsing <50% with sniff suggests a high RA pressure  estimated at 15 mmHg or greater.  No pericardial effusion is present.     Assessment and Plan:   Marilu is a 60 year old adult with a past medical history including VSD s/p repair 1969, RA, HTN, Insomnia, Atrial Tachyarrhythmias, cardiac arrest, SSS s/p PPM 12/19, DLBCL, and exudative pericardial effusion. She presents to CORE clinic for routine follow up with stable BP at euvolemic state.      HFpEF. Note prior echo with reduced EF with uncontrolled HR likely inaccurate representation of EF. Findings on Echo with controlled HR consistent with HFpEF.   NYHA Class III, AHA/ACC Stage C  Rate control: 88  volume status: Euvolemic  Blood pressure control: Controlled during her clinic visits dating back to 4/22. Recommended she correlate her home BP cuff as this is running higher.   Aldosterone antagonist: Aldactone 25 mg po daily  Evaluation of coronary arteries: 6/17 NCA per Angiogram     HTN. Intolerant to Metoprolol in the past.   - Atenolol 50 mg in AM and 25 mg in the evening.   - Controlled during her clinic visits dating back to 4/22. Recommended she correlate her home BP cuff as this is running higher.      Aflutter. History of SSS s/p PPM 12/19. Long standing history of atrial arrythmias.  - Continue Digoxin 125 mcg po daily, level 1.6 4/20/22.  - Atenolol as above.   - Continue Eliquis.   - Follow up with EP as scheduled.   - Dig level obtained per her request.      VSD s/p repair.      History of exudative pericardial effusion.   - Stable per CT  3/19.     Moderate to severe TR. Stable per follow up Echo 8/25/21.   - Continue to monitor symptoms, Echo in 1 month as scheduled    Follow up with Dr. Eaton as scheduled with Echo prior. BP log and correlation in 1-2 weeks.       Halle Vasquez, APRN CNP  7/19/2022

## 2022-07-20 NOTE — NURSING NOTE
Chief Complaint   Patient presents with     Follow Up     Return CORE ; 61 year old with chronic diastolic heart failure presents for follow up with labs prior.          Vitals were taken and medications reconciled.    Gonsalo Young, EMT  2:12 PM

## 2022-07-21 DIAGNOSIS — M81.8 STEROID-INDUCED OSTEOPOROSIS: ICD-10-CM

## 2022-07-21 DIAGNOSIS — T38.0X5A STEROID-INDUCED OSTEOPOROSIS: ICD-10-CM

## 2022-07-21 RX ORDER — ALENDRONATE SODIUM 70 MG/1
TABLET ORAL
Qty: 12 TABLET | Refills: 3 | Status: SHIPPED | OUTPATIENT
Start: 2022-07-21 | End: 2023-02-10

## 2022-08-26 ENCOUNTER — ANCILLARY PROCEDURE (OUTPATIENT)
Dept: CARDIOLOGY | Facility: CLINIC | Age: 62
End: 2022-08-26
Attending: INTERNAL MEDICINE
Payer: COMMERCIAL

## 2022-08-26 VITALS
OXYGEN SATURATION: 98 % | SYSTOLIC BLOOD PRESSURE: 131 MMHG | HEIGHT: 58 IN | WEIGHT: 122.4 LBS | HEART RATE: 81 BPM | DIASTOLIC BLOOD PRESSURE: 86 MMHG | BODY MASS INDEX: 25.69 KG/M2

## 2022-08-26 DIAGNOSIS — I50.22 CHRONIC SYSTOLIC HEART FAILURE (H): ICD-10-CM

## 2022-08-26 DIAGNOSIS — Q21.0 VSD (VENTRICULAR SEPTAL DEFECT): ICD-10-CM

## 2022-08-26 DIAGNOSIS — I47.19 ATRIAL TACHYCARDIA (H): Primary | ICD-10-CM

## 2022-08-26 DIAGNOSIS — I47.19 ATRIAL TACHYCARDIA (H): ICD-10-CM

## 2022-08-26 DIAGNOSIS — I48.0 PAROXYSMAL ATRIAL FIBRILLATION (H): ICD-10-CM

## 2022-08-26 DIAGNOSIS — R00.1 BRADYCARDIA: ICD-10-CM

## 2022-08-26 LAB — LVEF ECHO: NORMAL

## 2022-08-26 PROCEDURE — G0463 HOSPITAL OUTPT CLINIC VISIT: HCPCS | Mod: 25

## 2022-08-26 PROCEDURE — 99214 OFFICE O/P EST MOD 30 MIN: CPT | Mod: 25 | Performed by: INTERNAL MEDICINE

## 2022-08-26 PROCEDURE — 93306 TTE W/DOPPLER COMPLETE: CPT | Performed by: INTERNAL MEDICINE

## 2022-08-26 PROCEDURE — 93280 PM DEVICE PROGR EVAL DUAL: CPT | Performed by: INTERNAL MEDICINE

## 2022-08-26 PROCEDURE — 93005 ELECTROCARDIOGRAM TRACING: CPT

## 2022-08-26 RX ORDER — FUROSEMIDE 40 MG
40 TABLET ORAL DAILY
Qty: 90 TABLET | Refills: 3 | Status: SHIPPED | OUTPATIENT
Start: 2022-08-26 | End: 2022-11-03

## 2022-08-26 ASSESSMENT — PAIN SCALES - GENERAL: PAINLEVEL: NO PAIN (0)

## 2022-08-26 NOTE — LETTER
8/26/2022      RE: Amelia Michel  [address]  [facility] [address]       Dear Colleague,    Thank you for the opportunity to participate in the care of your patient, Amelia Michel, at the [facility] [place] at [facility]. Please see a copy of my visit note below.    Electrophysiology Clinic Note  HPI:   Ms. Michel is a 60 year old female who has a past medical history significant for VSD s/p repair 1969, RA, HTN, insomnia, atrial tachyarrhythmias, cardiac arrest, SSS s/p PPM 12/2019,  DLBCL, and exudative pericardial effusion.      She was admitted from 5/15/17-5/23/17 with atrial tachyarrhythmias (SVT, AF, AFL) with symptoms of palpitations, fatigue, and nausea. An echo showed LVEF 40-45%, mildly reduced RVEF, moderate biatrial enlargement, mild mitral regurgitation, and moderate tricuspid regurgitation. . She was started on Eliquis and underwent a BRE/DCCV on 5/17/17 c/b hypotension and recurrent arrhythmia.She was started on Sotalol and chemically cardioverted. However, she had nausea and thought it was from the Sotalol so this was stopped. She reverted back to AF/AFL and a rate control strategy was adopted which was difficult given symptoms so she started amiodarone. CMRI  showed LVEF 55%, mildly dilated RV with focal area of dyskinesis in RVOT, severe bi-atrial enlargement, severe TR, mild/moderate MR, and DE at RV septal insertion site. RFA was discussed but was defered as patient had a UTI at the time with ESBL.     She was readmitted on 6/19/17-8/4/17 after suffering an out of hospital cardiac arrest.  Upon EMS arrival, she was in VF. She was externally defibrillated and had ROSC in the field. She was intubated and brought to [facility] found to be in escape rhythm 43 bpm. She was taken emergently to cath lab where coronary angiogram showed normal coronary arteries. Temporary pacemaker was placed in RA given sinus arrest. She was also  "placed on therapeutic hypothermia. She had been having nausea, diarrhea, and intermittent vomiting over the last week and generally had not been feeling well over the last month and also had saddle anesthesia with lower extremity numbness that had been evaluated by neuro as an outpatient.  Ultimately found to have DLBCL started on chemo cycles. 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     Her DLBCL was treated with R-EPOCH for one cycle while in the hospital complicated by cytopenias followed by 5 cycles of R-CHOP finished the cycles in December of 2017 currently on surveillance scans. She had a pericardial effusion for which she is on colchicine which was discontinued at the end of 6/2018.      She has then followed intermittently with EP in clinic. She had some recurrent AF in 4/2018 requiring ED visit with DCCV with recurrence and another DCCV. We restarted digoxin 4/4/18 after which she had one episode of AF s/p DCCV and has since maintained sinus rhythm and reported good symptomatic control. She was seen in 5/2018 and reported having a feeling of her heart \"rolling\" daily lasting about about 10 sec at the most. The last time she required DCCV was in 4/12/2018. Her cMRI had shown some organization of her [ericardial effusion) and she has been initiated on colchicine. Chest CT in 9/2018 showed no evidence of lymphoma recurrence and improved pericardial effusion. Last EP follow up was in 2/2019 and she was doing well without issues.    She then presented 12/12/2019 with 1 day history palpitations, headaches, and nausea. She also endorses some dizziness when walking but not at rest. She was found to be bradycardic with intermittent junctional rhythm into 30's. Electrolytes and renal function stable. Her atenolol and digoxin were held. She continued to have junctional/bradycardia and decision was made to " pursue PPM.    EP Visit 4/10/20: She presents today for follow up. She reports feeling well. She states she does have intermittent episodes of palpitations, that have not been overly bothersome to her. Device site well healed. She denies any chest pain/pressures, dizziness, lightheadedness, worsening shortness of breath, leg/ankle swelling, PND, orthopnea, or syncopal symptoms. Device site is reported well healed. Device transmission shows presenting rhythm is AP/VS @ 60 bpm. 1 NSVT, 6 Fast A&V and 272 AF episodes recorded. All EGMs show AF/AT with RVR. AF burden= 75.3%. She is taking eliquis. Normal device function. AP= 26% and = 13%. No short V-V intervals recorded. Lead trends appear stable. Battery estimates 14.2 years to OZZY. Current cardiac medications include: Elqiuis, Atenolol, Digoxin, Lasix, and Spironolactone.     EP Visit 10/9/20: She presents today for follow up. She reports feeling well. She denies any chest pain/pressures, dizziness, lightheadedness, worsening shortness of breath, leg/ankle swelling, PND, orthopnea, palpitations, or syncopal symptoms. Device interrogation shows normal pacemaker function. 2 Fast A&V and 1 NSVT episodes recorded since last remote transmission. Intrinsic rhythm = Atrial Flutter, w/ VS @ 88 bpm. AF burden = 85.9%. Pt is on Eliquis. AP = 16.7%.  = 23.6%.  Estimated battery longevity to OZZY = 13.6 years. No short v-v intervals recorded. Lead trends appear stable. Presenting 12 lead ECG shows AF/AFL and  Vent Rate 69 bpm,  ms, QTc 473 ms. Echo shows LVEF 60-65%, mildly dilated RV with normal function, moderate TR, unchanged from prior echo. Current cardiac medications include: Elqiuis, Atenolol, Digoxin, Lasix, and Spironolactone.     EP visit 8/25/21: She presents today for follow up. She had some more RVR and her digoxin was resumed. She has been having ongoing fatigue which is now improved with better rate control. She denies any chest pain/pressures,  "dizziness, lightheadedness, worsening shortness of breath, leg/ankle swelling, PND, orthopnea, palpitations, or syncopal symptoms. Device interrogations shows normal device function, stable lead parameters, intrinsic is AFL/VS/ at 60 bpm, HR histograms adequate, 100% AF/AFL since 11/2020, AP <0.1%,  49.3%. Echo today shows normal LVEF 60-65%, mildly decreased RV function, moderate TI, and dilated IVC. Current cardiac medications include: Elqiuis, Atenolol, Digoxin, Lasix, and Spironolactone. SHe was asymptomatic with her AFL with preserved EF, hence we continued conservative management.    She presents today for follow-up. Patient feels she is \"in failure\" because she feels bloated and liver under tension. TTE done today shows a normal EF, dilated IVC with mod to sev TR and some pulmonary HTN, severe biatrial enlargement. Creatinine 1.09 7/20/22, weight has not increased much but she feels like it did  Otherwise no bleeding issues with Eliquis.   Her device interrogation today shows that she is V pacing 77% of the time, had runs been as nonsustained VT but available EGM suggest atrial fibrillation with RVR, battery life 11 years.  Her EKG today shows a baseline of atrial fibrillation with ventricular pacing at 68 bpm.      PAST MEDICAL HISTORY:  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     VSD (ventricular septal defect)        CURRENT MEDICATIONS:  Current Outpatient Medications   Medication Sig Dispense Refill     acetaminophen (TYLENOL) 500 MG tablet Take 500 mg by mouth every 6 hours as needed for mild pain       alendronate (FOSAMAX) 70 MG tablet TAKE 1 TABLET(70 MG) BY MOUTH EVERY 7 DAYS 12 tablet 3     apixaban ANTICOAGULANT (ELIQUIS ANTICOAGULANT) 5 MG tablet Take " 1 tablet (5 mg) by mouth 2 times daily 180 tablet 3     atenolol (TENORMIN) 25 MG tablet 50 mg in AM and 25 mg in the evening 270 tablet 4     calcium carbonate 600 mg-vitamin D 400 units (CALTRATE) 600-400 MG-UNIT per tablet Take 2 tablets by mouth daily       digoxin (LANOXIN) 125 MCG tablet Take 1 tablet (125 mcg) by mouth daily 90 tablet 3     furosemide (LASIX) 20 MG tablet Take 1 tablet (20 mg) by mouth daily 90 tablet 3     gabapentin (NEURONTIN) 100 MG capsule Take 1 capsule AM + 1 capsule afternoon + 2 capsules at bedtime 360 capsule 1     hydroxychloroquine (PLAQUENIL) 200 MG tablet Take 1 tablet (200 mg) by mouth daily 90 tablet 3     loratadine (CLARITIN) 10 MG tablet Take 10 mg by mouth daily       multivitamin, therapeutic with minerals (THERA-VIT-M) TABS tablet Take 1 tablet by mouth daily 30 each 0     predniSONE (DELTASONE) 1 MG tablet Take 3 tablets (3 mg) by mouth daily 270 tablet 3     sertraline (ZOLOFT) 50 MG tablet Take 0.5 tablets (25 mg) by mouth daily for 7 days, THEN 1 tablet (50 mg) daily for 30 days. 34 tablet 0     spironolactone (ALDACTONE) 25 MG tablet Take 1 tablet (25 mg) by mouth daily 90 tablet 3     STATIN NOT PRESCRIBED (INTENTIONAL) Please choose reason not prescribed, below (Patient not taking: No sig reported)       traZODone (DESYREL) 50 MG tablet Take 1-2 tablets ( mg) by mouth At Bedtime 60 tablet 0       PAST SURGICAL HISTORY:  Past Surgical History:   Procedure Laterality Date     ANESTHESIA CARDIOVERSION N/A 5/17/2017    Procedure: ANESTHESIA CARDIOVERSION;  ANESTHESIA CARDIOVERSION;  Surgeon: GENERIC ANESTHESIA PROVIDER;  Location: [facility]     ANESTHESIA CARDIOVERSION  3/12/2018    Procedure: ANESTHESIA CARDIOVERSION;;  Surgeon: GENERIC ANESTHESIA PROVIDER;  Location: [facility] OR     ANESTHESIA CARDIOVERSION N/A 3/23/2018    Procedure: ANESTHESIA CARDIOVERSION;  Anesthesia Cardioversion ;  Surgeon: GENERIC ANESTHESIA PROVIDER;  Location: [facility]     ANESTHESIA  CARDIOVERSION N/A 4/12/2018    Procedure: ANESTHESIA CARDIOVERSION;  Cardioversion;  Surgeon: GENERIC ANESTHESIA PROVIDER;  Location: [facility] OR     ARTHRODESIS WRIST  2000    Right wrist     BONE MARROW ASPIRATE &BIOPSY  7/12/2017          BONE MARROW BIOPSY W/ ASPIRATION  07/12/2017     CARDIOVERSION      5/17/17, 3/12/18, 3/23/18, 4/14/18,      COLONOSCOPY N/A 11/19/2019    Procedure: Colonoscopy, With Polypectomy And Biopsy;  Surgeon: Pj Vasques MD;  Location: [facility]     EP PACEMAKER N/A 12/13/2019    Procedure: EP Pacemaker;  Surgeon: Pj Trent MD;  Location:  HEART CARDIAC CATH LAB     EXCISE LESION UPPER EXTREMITY Left 5/22/2018    Procedure: EXCISE LESION UPPER EXTREMITY;  Left Arm Wound Excision And Closure, Possible Submuscular Transposition of Ulnar Nerve  (Choice Anesthesia);  Surgeon: Kevin Sheldon MD;  Location:  OR     FOOT SURGERY      4 left and 2 right     FOOT SURGERY      4 Left and 2 Right      IMPLANT PACEMAKER  12/13/2019    Dr China Trent   [facility]      IMPLANT PACEMAKER       RELEASE CARPAL TUNNEL Bilateral      RELEASE CARPAL TUNNEL BILATERAL       REPAIR VENTRICULAR SEPTAL DEFECT  1969     TRANSPOSITION ULNAR NERVE (ELBOW) Left 5/22/2018    Procedure: TRANSPOSITION ULNAR NERVE (ELBOW);;  Surgeon: Kevin Sheldon MD;  Location:  OR     ULNAR NERVE TRANSPOSITION Left 05/22/2018     VSD REPAIR  1969     ZZC FUSION FOOT BONES,SUBTALAR Right 10/20/2020    Procedure: RIGHT SUBTALAR JOINT FUSION AND CALCANEOCUBOID FUSION;  Surgeon: Nemesio Crow MD;  Location: [facility];  Service: Orthopedics       ALLERGIES:     Allergies   Allergen Reactions     Amiodarone Other (See Comments) and Difficulty breathing     Lethargic and had trouble breathing- occurred in 2017     Blood Transfusion Related (Informational Only) Hives     Hives with platelets. Give benadryl premedication.     Metoprolol Other (See Comments)     Pt and  report that metoprolol  "does not work for her and also reports feeling unwell with this medication. She has been able to tolerate atenolol, which as worked in controlling her HR.      Other [No Clinical Screening - See Comments]      Penicillin Allergy Skin Test not performed, see antimicrobial management team progress note 7/5/17.       Penicillins      Tape [Adhesive Tape] Rash       FAMILY HISTORY:  Family History   Problem Relation Age of Onset     Breast Cancer Mother      Hypertension Mother      Alzheimer Disease Mother      Cerebrovascular Disease Mother      Hypertension Father      Cerebrovascular Disease Father      Atrial fibrillation Father      Lymphoma Father      Prostate Cancer Father      Diabetes Paternal Grandmother      Alzheimer Disease Maternal Grandmother         likely     Arthritis Maternal Grandfather      Pneumonia Maternal Grandfather        SOCIAL HISTORY:  Social History     Tobacco Use     Smoking status: Never Smoker     Smokeless tobacco: Never Used   Substance Use Topics     Alcohol use: Yes     Comment: none     Drug use: No         Exam:  /86 (BP Location: Right arm, Patient Position: Sitting, Cuff Size: Adult Regular)   Pulse 81   Ht 1.481 m (4' 10.3\")   Wt 55.5 kg (122 lb 6.4 oz)   SpO2 98%   BMI 25.32 kg/m    GENERAL APPEARANCE: alert and no distress  HEENT: no icterus, no xanthelasmas, normal pupil size and reaction, normal palate, mucosa moist, no central cyanosis  NECK: Supple.  RESPIRATORY: lungs clear to auscultation - no rales, rhonchi or wheezes, no use of accessory muscles, no retractions, respirations are unlabored, normal respiratory rate  CARDIOVASCULAR:Irregular, soft murmur.  ABDOMEN: soft, non tender, bowel sounds normal, non-distended  EXTREMITIES: peripheral pulses normal, no edema  NEURO: alert and oriented to person/place/time, normal speech, gait and affect  SKIN: no ecchymoses, no rashes  PSYCH: normal affect, cooperative      Testing/Procedures:  9/11/17 " ECHOCARDIOGRAM:   Interpretation Summary  Left ventricular function, chamber size, wall motion, and wall thickness are  normal.The EF is 60-65% (traced 60%.) GLS -18%.  The right ventricle is normal size and normal in function. The RV is mildly dilated.  Moderate tricuspid insufficiency is present.  Mild pulmonary hypertension is present (esimated PASP 55 mmHg including RA pressure.)  Dilation of the inferior vena cava is present with abnormal respiratory  variation in diameter (esitimated RAP 15 mmHg.)  This study was compared with the study from 8/31/17: TR appears slightly decreased from the earlier study.     7/24/17 CMRI:     1. The LV is normal in cavity size and wall thickness. The global systolic function is normal. The LVEF is  64%. There are no regional wall motion abnormalities. No evidence of myocardial iron overload.   2. The RV is normal in cavity size. The global systolic function is normal. The RVEF is 59%.   3. There is moderate dilation of bilateral atria.   4. Tricuspid regurgitation is present, difficult to quantify due to image quality.   5. Late gadolinium enhancement imaging shows RV insertion site hyperenhancement, a non-specific finding  seen in patients with RV volume/pressure overload.   6. There is a moderate right pleural effusion and small left pleural effusion.    CONCLUSIONS: Normal Bi-Ventricular systolic function, LVEF 64% and RVE 59%. There is no evidence of  infiltrative disease. Compared to the MRI from 5/15/2017, there is no significant change.      7/2017 BRE:  Interpretation Summary  There is a small mass (0.7 cm by 0.2 cm) on the ventricular side of the right  coronary cusp. There is a second mass (0.4 cm by 0.2 cm) observed in the LVOT,  attached to the mitro-aortic curtain. There is a third mass on the noncoronary  cusp that measures 0.4 cm by 0.2 cm that could be a healing vegetation. These  findings are compatible with endocarditis if clinical picture supports.  Right  ventricular function, chamber size, wall motion, and thickness are  normal.  This study was compared with the study from 7/10/2017.  There is detection of masses on the aortic valve and LVOT.     4/12/18 CMRI:  1. The LV is normal in cavity size and wall thickness. The global systolic function is normal. The LVEF is  54%. There are no regional wall motion abnormalities.  2. The RV is mildly dilated in cavity size. The global systolic function is normal. The RVEF is 49%.   3. Both atria are severely enlarged.  4. There is at least moderate, possibly severe tricuspid regurgitation.   5. Late gadolinium enhancement imaging shows hyperenhancement in the RV insertion site consistent with RV  pressure/volume overload.   6. There is a loculated pericardial effusion along the basal lateral and basal to mid inferior LV walls,  and along the inferior RV wall. it appears exudative in nature, and is beginning to organize. There is  diffuse pericardial enhancement.  7. There is no intracardiac thrombus or mass.  8. There is a small right pleural effusion.    9/26/18 CHEST CT:                                                                   IMPRESSION: In this patient with a history of lymphoma:  1. No evidence of disease recurrence, consistent with complete  response per Lugano criteria.  2. Stable cardiomegaly. Improved pericardial effusion.  3. Early contrast phase with heterogeneous visceral enhancement in the  abdomen, likely secondary to cardiac dysfunction.    10/9/20 ECHOCARDIOGRAM:  Interpretation Summary  VSD s/p repair in 1969  Global and regional left ventricular function is normal with an EF of 60-65%.  No flow acceleration is seen across the septum.  Global right ventricular function is normal. Mild right ventricular dilation  is present.  There is moderate tricuspid regurgitation.  The estimated PA systolic pressure is 32 mmHg but this is likely to be  underestimated due to the presence of multiple jets.  This  study was compared with the study from 09/11/2017. There has been no  change in the severity of the tricuspid regurgitation or the RV size/function.  8/2021 ECHOCARDIOGRAM:  terpretation Summary  H/O of VSD repair in 1969  The visual ejection fraction is 60-65%. No wall motion abnormalities. Global  right ventricular function is mildly reduced.  Moderate tricuspid insufficiency is present.  There is a right ventricular right atrial pressure difference of 24mmHg.  IVC diameter >2.1 cm collapsing <50% with sniff suggests a high RA pressure  estimated at 15 mmHg or greater.  No pericardial effusion is present.    TTE 8/26/22:  Interpretation Summary  Left ventricular function, chamber size, wall motion, and thickness are normal.  Severe biatrial enlargement is present.  Moderate to severe tricuspid insufficiency is present.  Dilation of the inferior vena cava is present with abnormal respiratory  variation in diameter.  No pericardial effusion is present.    Assessment and Plan:   Ms. Michel is a 60 year old female who has a past medical history significant for VSD s/p repair 1969, RA, HTN, insomnia, atrial tachyarrhythmias, cardiac arrest, SSS s/p PPM 12/2019,  DLBCL, and exudative pericardial effusion. She presents today for follow up.   She is feeling very well with the exception of feeling bloated in the abdomen.  Her echocardiogram supports the fact that she is volume overloaded and has diastolic heart failure.  She is currently chronic atrial fibrillation and is well rate controlled.  She is on a rate control strategy.      Sick Sinus Syndrome:   Atrial Fibrillation:  1. Stroke Prophylaxis:  CHADSVASC=+HTN, +gender  2, corresponding to a 2.2% annual stroke / systemic emolism event rate. indicating need for long term oral anticoagulation.  She is on Eliquis. No bleeding issues.   2. Rate Control: Continue Atenolol. Had RVR off digoxin and is much better on digoxin, continue.   3. Rhythm Control: Cardioversion,  Antiarrhythmics and/or ablation are options for rhythm control. She has had several DCCV last in 4/12/19. Notably, she had possible side effects from Sotalol (however, likely was due to underlying illness at the time and not from medication since she was found to have DBCL).  She is currently in chronic AFL/AF but is asymptomatic and LVEF preserved.   4. Had tachy-shelton syndrome and underwent PPM implant in 12/2019. Normal device function with stable lead parameters. She will have continued follow up with Device clinic per routine.     For her volume overload we will increase Lasix to 40 daily BMP in 1 week, and have her follow-up with core clinic in 4 weeks.  F/u in one year with EP NP    The patient states understanding and is agreeable with plan.   Juan Eaton MD [facility]  Cardiology - Electrophysiology    Total time spent on patient visit, reviewing notes, imaging, labs, orders, and completing necessary documentation: 30 minutes.  >50% of visit spent on counseling patient and/or coordination of care.

## 2022-08-26 NOTE — PATIENT INSTRUCTIONS
It was a pleasure to see you in clinic today. Please do not hesitate to call with any questions or concerns. You are scheduled for a remote transmission on 11/29/22. We look forward to seeing you in clinic at your next device check in 6 months.     Pam Wang, ABBEY  Electrophysiology Nurse Clinician  [facility]    During business hours please call: [phone]  After hours please call: [phone] - select option #4 and ask for job code [phone]

## 2022-08-26 NOTE — NURSING NOTE
Chief Complaint   Patient presents with     Follow Up     \\61 year old female with history of VSD s/p repair 1969, RA, HTN, insomnia, atrial tachyarrhythmias, cardiac arrest, and DLBCL presenting for follow up.        Vitals were taken and medications were reconciled. EKG was performed   HSAWN Christy   2:53 PM

## 2022-08-26 NOTE — NURSING NOTE
Labs: Patient was given results of the laboratory testing obtained today. Patient was instructed to return for the next laboratory testing in 1 week . Patient demonstrated understanding of this information and agreed to call with further questions or concerns.     Return Appointment: Patient given instructions regarding scheduling next clinic visit. Patient demonstrated understanding of this information and agreed to call with further questions or concerns.    Medication Change: Patient was educated regarding prescribed medication change, including discussion of the indication, administration, side effects, and when to report to MD or RN. Patient demonstrated understanding of this information and agreed to call with further questions or concerns.    Patient stated Amelia Michel understood all health information given and agreed to call with further questions or concerns.

## 2022-08-26 NOTE — PATIENT INSTRUCTIONS
You were seen today in the Adult Congenital and Cardiovascular Genetics Clinic at the [facility].    Cardiology Providers you saw during your visit:  Juan Eaton MD    Diagnosis:  atrial tachycardia    Results:  Juan Eaton MD reviewed the results of your EKG, device and echo testing today in clinic.    Recommendations:    Continue to eat a heart healthy, low salt diet.  Continue to get 20-30 minutes of aerobic activity, 4-5 days per week.  Examples of aerobic activity include walking, running, swimming, cycling, etc.  Continue to observe good oral hygiene, with regular dental visits.  Follow up with core clinic in 1 month  INCREASE lasix to 40 mg daily  Get your labs drawn in 1 week after starting lasix increase      SBE prophylaxis:   Yes____  No__x__      Exercise restrictions:   Yes__X__  No____         If yes, list restrictions:  Must be allowed to rest if fatigued or SOB      Work restrictions:  Yes____  No_X___         If yes, list restrictions:    FASTING CHOLESTEROL was checked in the last 5 years YES_x__  NO___ (2019)  Continue to eat a heart healthy, low salt diet.         ____ Fasting lipid panel order today         ____ No changes in medications          ____ I recommend the following changes in your cholesterol medications.:          ____ Please follow up for cholesterol screening at your primary care physician      Follow-up:  Follow up with an EP NP in 1 year     If you have questions or concerns please contact us at:    ASHLEIGH SullivanN, RN    Lesli Glaser (Scheduling)  Nurse Care Coordinator     Clinic   Adult Congenital and CV Genetics   Adult Congenital and CV Genetic  [facility]   [facility]  (P) [phone]     (P) [phone]         (F) [phone]        For after hours urgent needs, call [phone] and ask to speak to the Adult Congenital Physician on call.  Mention Job Code 0401.    For  emergencies call 911.    [facility]  [facility]  Mail Code [id number]  [address] [place]  [address]

## 2022-08-26 NOTE — PROGRESS NOTES
Electrophysiology Clinic Note  HPI:   Ms. Michel is a 60 year old female who has a past medical history significant for VSD s/p repair 1969, RA, HTN, insomnia, atrial tachyarrhythmias, cardiac arrest, SSS s/p PPM 12/2019,  DLBCL, and exudative pericardial effusion.      She was admitted from 5/15/17-5/23/17 with atrial tachyarrhythmias (SVT, AF, AFL) with symptoms of palpitations, fatigue, and nausea. An echo showed LVEF 40-45%, mildly reduced RVEF, moderate biatrial enlargement, mild mitral regurgitation, and moderate tricuspid regurgitation. . She was started on Eliquis and underwent a BRE/DCCV on 5/17/17 c/b hypotension and recurrent arrhythmia.She was started on Sotalol and chemically cardioverted. However, she had nausea and thought it was from the Sotalol so this was stopped. She reverted back to AF/AFL and a rate control strategy was adopted which was difficult given symptoms so she started amiodarone. CMRI  showed LVEF 55%, mildly dilated RV with focal area of dyskinesis in RVOT, severe bi-atrial enlargement, severe TR, mild/moderate MR, and DE at RV septal insertion site. RFA was discussed but was defered as patient had a UTI at the time with ESBL.     She was readmitted on 6/19/17-8/4/17 after suffering an out of hospital cardiac arrest.  Upon EMS arrival, she was in VF. She was externally defibrillated and had ROSC in the field. She was intubated and brought to [facility] found to be in escape rhythm 43 bpm. She was taken emergently to cath lab where coronary angiogram showed normal coronary arteries. Temporary pacemaker was placed in RA given sinus arrest. She was also placed on therapeutic hypothermia. She had been having nausea, diarrhea, and intermittent vomiting over the last week and generally had not been feeling well over the last month and also had saddle anesthesia with lower extremity numbness that had been evaluated by neuro as an outpatient.  Ultimately found to have DLBCL started on chemo cycles. 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 Lymphoma and was eventually discharged to TCU on a lifevest.     Her DLBCL was treated with R-EPOCH for one cycle while in the hospital complicated by cytopenias followed by 5 cycles of R-CHOP finished the cycles in December of 2017 currently on surveillance scans. She had a pericardial effusion for which she is on colchicine which was discontinued at the end of 6/2018.      She has then followed intermittently with EP in clinic. She had some recurrent AF in 4/2018 requiring ED visit with DCCV with recurrence and another DCCV. We restarted digoxin 4/4/18 after which she had one episode of AF s/p DCCV and has since maintained sinus rhythm and reported good symptomatic control. She was seen in 5/2018 and reported having a feeling of her heart \"rolling\" daily lasting about about 10 sec at the most. The last time she required DCCV was in 4/12/2018. Her cMRI had shown some organization of her [ericardial effusion) and she has been initiated on colchicine. Chest CT in 9/2018 showed no evidence of lymphoma recurrence and improved pericardial effusion. Last EP follow up was in 2/2019 and she was doing well without issues.    She then presented 12/12/2019 with 1 day history palpitations, headaches, and nausea. She also endorses some dizziness when walking but not at rest. She was found to be bradycardic with intermittent junctional rhythm into 30's. Electrolytes and renal function stable. Her atenolol and digoxin were held. She continued to have junctional/bradycardia and decision was made to pursue PPM.    EP Visit 4/10/20: She presents today for follow up. She reports feeling well. She states she does have intermittent episodes of palpitations, that have not been overly bothersome to her. Device site well healed. She denies any chest pain/pressures, dizziness, lightheadedness, worsening shortness of breath, leg/ankle swelling, PND, " orthopnea, or syncopal symptoms. Device site is reported well healed. Device transmission shows presenting rhythm is AP/VS @ 60 bpm. 1 NSVT, 6 Fast A&V and 272 AF episodes recorded. All EGMs show AF/AT with RVR. AF burden= 75.3%. She is taking eliquis. Normal device function. AP= 26% and = 13%. No short V-V intervals recorded. Lead trends appear stable. Battery estimates 14.2 years to OZZY. Current cardiac medications include: Elqiuis, Atenolol, Digoxin, Lasix, and Spironolactone.     EP Visit 10/9/20: She presents today for follow up. She reports feeling well. She denies any chest pain/pressures, dizziness, lightheadedness, worsening shortness of breath, leg/ankle swelling, PND, orthopnea, palpitations, or syncopal symptoms. Device interrogation shows normal pacemaker function. 2 Fast A&V and 1 NSVT episodes recorded since last remote transmission. Intrinsic rhythm = Atrial Flutter, w/ VS @ 88 bpm. AF burden = 85.9%. Pt is on Eliquis. AP = 16.7%.  = 23.6%.  Estimated battery longevity to OZZY = 13.6 years. No short v-v intervals recorded. Lead trends appear stable. Presenting 12 lead ECG shows AF/AFL and  Vent Rate 69 bpm,  ms, QTc 473 ms. Echo shows LVEF 60-65%, mildly dilated RV with normal function, moderate TR, unchanged from prior echo. Current cardiac medications include: Elqiuis, Atenolol, Digoxin, Lasix, and Spironolactone.     EP visit 8/25/21: She presents today for follow up. She had some more RVR and her digoxin was resumed. She has been having ongoing fatigue which is now improved with better rate control. She denies any chest pain/pressures, dizziness, lightheadedness, worsening shortness of breath, leg/ankle swelling, PND, orthopnea, palpitations, or syncopal symptoms. Device interrogations shows normal device function, stable lead parameters, intrinsic is AFL/VS/ at 60 bpm, HR histograms adequate, 100% AF/AFL since 11/2020, AP <0.1%,  49.3%. Echo today shows normal LVEF 60-65%, mildly  "decreased RV function, moderate TI, and dilated IVC. Current cardiac medications include: Elqiuis, Atenolol, Digoxin, Lasix, and Spironolactone. SHe was asymptomatic with her AFL with preserved EF, hence we continued conservative management.    She presents today for follow-up. Patient feels she is \"in failure\" because she feels bloated and liver under tension. TTE done today shows a normal EF, dilated IVC with mod to sev TR and some pulmonary HTN, severe biatrial enlargement. Creatinine 1.09 7/20/22, weight has not increased much but she feels like it did  Otherwise no bleeding issues with Eliquis.   Her device interrogation today shows that she is V pacing 77% of the time, had runs been as nonsustained VT but available EGM suggest atrial fibrillation with RVR, battery life 11 years.  Her EKG today shows a baseline of atrial fibrillation with ventricular pacing at 68 bpm.      PAST MEDICAL HISTORY:  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     VSD (ventricular septal defect)        CURRENT MEDICATIONS:  Current Outpatient Medications   Medication Sig Dispense Refill     acetaminophen (TYLENOL) 500 MG tablet Take 500 mg by mouth every 6 hours as needed for mild pain       alendronate (FOSAMAX) 70 MG tablet TAKE 1 TABLET(70 MG) BY MOUTH EVERY 7 DAYS 12 tablet 3     apixaban ANTICOAGULANT (ELIQUIS ANTICOAGULANT) 5 MG tablet Take 1 tablet (5 mg) by mouth 2 times daily 180 tablet 3     atenolol (TENORMIN) 25 MG tablet 50 mg in AM and 25 mg in the evening 270 tablet 4     calcium carbonate 600 mg-vitamin D 400 units (CALTRATE) 600-400 MG-UNIT per tablet Take 2 tablets by mouth daily       digoxin (LANOXIN) 125 MCG tablet Take 1 tablet (125 mcg) by mouth daily 90 tablet 3     " furosemide (LASIX) 20 MG tablet Take 1 tablet (20 mg) by mouth daily 90 tablet 3     gabapentin (NEURONTIN) 100 MG capsule Take 1 capsule AM + 1 capsule afternoon + 2 capsules at bedtime 360 capsule 1     hydroxychloroquine (PLAQUENIL) 200 MG tablet Take 1 tablet (200 mg) by mouth daily 90 tablet 3     loratadine (CLARITIN) 10 MG tablet Take 10 mg by mouth daily       multivitamin, therapeutic with minerals (THERA-VIT-M) TABS tablet Take 1 tablet by mouth daily 30 each 0     predniSONE (DELTASONE) 1 MG tablet Take 3 tablets (3 mg) by mouth daily 270 tablet 3     sertraline (ZOLOFT) 50 MG tablet Take 0.5 tablets (25 mg) by mouth daily for 7 days, THEN 1 tablet (50 mg) daily for 30 days. 34 tablet 0     spironolactone (ALDACTONE) 25 MG tablet Take 1 tablet (25 mg) by mouth daily 90 tablet 3     STATIN NOT PRESCRIBED (INTENTIONAL) Please choose reason not prescribed, below (Patient not taking: No sig reported)       traZODone (DESYREL) 50 MG tablet Take 1-2 tablets ( mg) by mouth At Bedtime 60 tablet 0       PAST SURGICAL HISTORY:  Past Surgical History:   Procedure Laterality Date     ANESTHESIA CARDIOVERSION N/A 5/17/2017    Procedure: ANESTHESIA CARDIOVERSION;  ANESTHESIA CARDIOVERSION;  Surgeon: GENERIC ANESTHESIA PROVIDER;  Location: [facility]     ANESTHESIA CARDIOVERSION  3/12/2018    Procedure: ANESTHESIA CARDIOVERSION;;  Surgeon: GENERIC ANESTHESIA PROVIDER;  Location: [facility]     ANESTHESIA CARDIOVERSION N/A 3/23/2018    Procedure: ANESTHESIA CARDIOVERSION;  Anesthesia Cardioversion ;  Surgeon: GENERIC ANESTHESIA PROVIDER;  Location: [facility]     ANESTHESIA CARDIOVERSION N/A 4/12/2018    Procedure: ANESTHESIA CARDIOVERSION;  Cardioversion;  Surgeon: GENERIC ANESTHESIA PROVIDER;  Location: [facility] OR     ARTHRODESIS WRIST  2000    Right wrist     BONE MARROW ASPIRATE &BIOPSY  7/12/2017          BONE MARROW BIOPSY W/ ASPIRATION  07/12/2017     CARDIOVERSION      5/17/17, 3/12/18, 3/23/18, 4/14/18,      COLONOSCOPY N/A  11/19/2019    Procedure: Colonoscopy, With Polypectomy And Biopsy;  Surgeon: Pj Vasques MD;  Location: [facility]     EP PACEMAKER N/A 12/13/2019    Procedure: EP Pacemaker;  Surgeon: Pj Trent MD;  Location:  HEART CARDIAC CATH LAB     EXCISE LESION UPPER EXTREMITY Left 5/22/2018    Procedure: EXCISE LESION UPPER EXTREMITY;  Left Arm Wound Excision And Closure, Possible Submuscular Transposition of Ulnar Nerve  (Choice Anesthesia);  Surgeon: Kevin Sheldon MD;  Location:  OR     FOOT SURGERY      4 left and 2 right     FOOT SURGERY      4 Left and 2 Right      IMPLANT PACEMAKER  12/13/2019    Dr China Trent   [facility]      IMPLANT PACEMAKER       RELEASE CARPAL TUNNEL Bilateral      RELEASE CARPAL TUNNEL BILATERAL       REPAIR VENTRICULAR SEPTAL DEFECT  1969     TRANSPOSITION ULNAR NERVE (ELBOW) Left 5/22/2018    Procedure: TRANSPOSITION ULNAR NERVE (ELBOW);;  Surgeon: Kevin Sheldon MD;  [facility]     ULNAR NERVE TRANSPOSITION Left 05/22/2018     VSD REPAIR  1969     ZZC FUSION FOOT BONES,SUBTALAR Right 10/20/2020    Procedure: RIGHT SUBTALAR JOINT FUSION AND CALCANEOCUBOID FUSION;  Surgeon: Nemesio Crow MD;  Location: [facility];  Service: Orthopedics       ALLERGIES:     Allergies   Allergen Reactions     Amiodarone Other (See Comments) and Difficulty breathing     Lethargic and had trouble breathing- occurred in 2017     Blood Transfusion Related (Informational Only) Hives     Hives with platelets. Give benadryl premedication.     Metoprolol Other (See Comments)     Pt and  report that metoprolol does not work for her and also reports feeling unwell with this medication. She has been able to tolerate atenolol, which as worked in controlling her HR.      Other [No Clinical Screening - See Comments]      Penicillin Allergy Skin Test not performed, see antimicrobial management team progress note 7/5/17.       Penicillins      Tape [Adhesive Tape] Rash  "      FAMILY HISTORY:  Family History   Problem Relation Age of Onset     Breast Cancer Mother      Hypertension Mother      Alzheimer Disease Mother      Cerebrovascular Disease Mother      Hypertension Father      Cerebrovascular Disease Father      Atrial fibrillation Father      Lymphoma Father      Prostate Cancer Father      Diabetes Paternal Grandmother      Alzheimer Disease Maternal Grandmother         likely     Arthritis Maternal Grandfather      Pneumonia Maternal Grandfather        SOCIAL HISTORY:  Social History     Tobacco Use     Smoking status: Never Smoker     Smokeless tobacco: Never Used   Substance Use Topics     Alcohol use: Yes     Comment: none     Drug use: No         Exam:  /86 (BP Location: Right arm, Patient Position: Sitting, Cuff Size: Adult Regular)   Pulse 81   Ht 1.481 m (4' 10.3\")   Wt 55.5 kg (122 lb 6.4 oz)   SpO2 98%   BMI 25.32 kg/m    GENERAL APPEARANCE: alert and no distress  HEENT: no icterus, no xanthelasmas, normal pupil size and reaction, normal palate, mucosa moist, no central cyanosis  NECK: Supple.  RESPIRATORY: lungs clear to auscultation - no rales, rhonchi or wheezes, no use of accessory muscles, no retractions, respirations are unlabored, normal respiratory rate  CARDIOVASCULAR:Irregular, soft murmur.  ABDOMEN: soft, non tender, bowel sounds normal, non-distended  EXTREMITIES: peripheral pulses normal, no edema  NEURO: alert and oriented to person/place/time, normal speech, gait and affect  SKIN: no ecchymoses, no rashes  PSYCH: normal affect, cooperative      Testing/Procedures:  9/11/17 ECHOCARDIOGRAM:   Interpretation Summary  Left ventricular function, chamber size, wall motion, and wall thickness are  normal.The EF is 60-65% (traced 60%.) GLS -18%.  The right ventricle is normal size and normal in function. The RV is mildly dilated.  Moderate tricuspid insufficiency is present.  Mild pulmonary hypertension is present (esimated PASP 55 mmHg including " RA pressure.)  Dilation of the inferior vena cava is present with abnormal respiratory  variation in diameter (esitimated RAP 15 mmHg.)  This study was compared with the study from 8/31/17: TR appears slightly decreased from the earlier study.     7/24/17 CMRI:     1. The LV is normal in cavity size and wall thickness. The global systolic function is normal. The LVEF is  64%. There are no regional wall motion abnormalities. No evidence of myocardial iron overload.   2. The RV is normal in cavity size. The global systolic function is normal. The RVEF is 59%.   3. There is moderate dilation of bilateral atria.   4. Tricuspid regurgitation is present, difficult to quantify due to image quality.   5. Late gadolinium enhancement imaging shows RV insertion site hyperenhancement, a non-specific finding  seen in patients with RV volume/pressure overload.   6. There is a moderate right pleural effusion and small left pleural effusion.    CONCLUSIONS: Normal Bi-Ventricular systolic function, LVEF 64% and RVE 59%. There is no evidence of  infiltrative disease. Compared to the MRI from 5/15/2017, there is no significant change.      7/2017 BRE:  Interpretation Summary  There is a small mass (0.7 cm by 0.2 cm) on the ventricular side of the right  coronary cusp. There is a second mass (0.4 cm by 0.2 cm) observed in the LVOT,  attached to the mitro-aortic curtain. There is a third mass on the noncoronary  cusp that measures 0.4 cm by 0.2 cm that could be a healing vegetation. These  findings are compatible with endocarditis if clinical picture supports.  Right ventricular function, chamber size, wall motion, and thickness are  normal.  This study was compared with the study from 7/10/2017.  There is detection of masses on the aortic valve and LVOT.     4/12/18 CMRI:  1. The LV is normal in cavity size and wall thickness. The global systolic function is normal. The LVEF is  54%. There are no regional wall motion abnormalities.  2.  The RV is mildly dilated in cavity size. The global systolic function is normal. The RVEF is 49%.   3. Both atria are severely enlarged.  4. There is at least moderate, possibly severe tricuspid regurgitation.   5. Late gadolinium enhancement imaging shows hyperenhancement in the RV insertion site consistent with RV  pressure/volume overload.   6. There is a loculated pericardial effusion along the basal lateral and basal to mid inferior LV walls,  and along the inferior RV wall. it appears exudative in nature, and is beginning to organize. There is  diffuse pericardial enhancement.  7. There is no intracardiac thrombus or mass.  8. There is a small right pleural effusion.    9/26/18 CHEST CT:                                                                   IMPRESSION: In this patient with a history of lymphoma:  1. No evidence of disease recurrence, consistent with complete  response per Lugano criteria.  2. Stable cardiomegaly. Improved pericardial effusion.  3. Early contrast phase with heterogeneous visceral enhancement in the  abdomen, likely secondary to cardiac dysfunction.    10/9/20 ECHOCARDIOGRAM:  Interpretation Summary  VSD s/p repair in 1969  Global and regional left ventricular function is normal with an EF of 60-65%.  No flow acceleration is seen across the septum.  Global right ventricular function is normal. Mild right ventricular dilation  is present.  There is moderate tricuspid regurgitation.  The estimated PA systolic pressure is 32 mmHg but this is likely to be  underestimated due to the presence of multiple jets.  This study was compared with the study from 09/11/2017. There has been no  change in the severity of the tricuspid regurgitation or the RV size/function.  8/2021 ECHOCARDIOGRAM:  terpretation Summary  H/O of VSD repair in 1969  The visual ejection fraction is 60-65%. No wall motion abnormalities. Global  right ventricular function is mildly reduced.  Moderate tricuspid insufficiency  is present.  There is a right ventricular right atrial pressure difference of 24mmHg.  IVC diameter >2.1 cm collapsing <50% with sniff suggests a high RA pressure  estimated at 15 mmHg or greater.  No pericardial effusion is present.    TTE 8/26/22:  Interpretation Summary  Left ventricular function, chamber size, wall motion, and thickness are normal.  Severe biatrial enlargement is present.  Moderate to severe tricuspid insufficiency is present.  Dilation of the inferior vena cava is present with abnormal respiratory  variation in diameter.  No pericardial effusion is present.    Assessment and Plan:   Ms. Michel is a 60 year old female who has a past medical history significant for VSD s/p repair 1969, RA, HTN, insomnia, atrial tachyarrhythmias, cardiac arrest, SSS s/p PPM 12/2019,  DLBCL, and exudative pericardial effusion. She presents today for follow up.   She is feeling very well with the exception of feeling bloated in the abdomen.  Her echocardiogram supports the fact that she is volume overloaded and has diastolic heart failure.  She is currently chronic atrial fibrillation and is well rate controlled.  She is on a rate control strategy.      Sick Sinus Syndrome:   Atrial Fibrillation:  1. Stroke Prophylaxis:  CHADSVASC=+HTN, +gender  2, corresponding to a 2.2% annual stroke / systemic emolism event rate. indicating need for long term oral anticoagulation.  She is on Eliquis. No bleeding issues.   2. Rate Control: Continue Atenolol. Had RVR off digoxin and is much better on digoxin, continue.   3. Rhythm Control: Cardioversion, Antiarrhythmics and/or ablation are options for rhythm control. She has had several DCCV last in 4/12/19. Notably, she had possible side effects from Sotalol (however, likely was due to underlying illness at the time and not from medication since she was found to have DBCL).  She is currently in chronic AFL/AF but is asymptomatic and LVEF preserved.   4. Had tachy-shelton syndrome and  underwent PPM implant in 12/2019. Normal device function with stable lead parameters. She will have continued follow up with Device clinic per routine.     For her volume overload we will increase Lasix to 40 daily BMP in 1 week, and have her follow-up with core clinic in 4 weeks.  F/u in one year with EP NP    The patient states understanding and is agreeable with plan.   Juan Eaton MD [facility]  Cardiology - Electrophysiology    Total time spent on patient visit, reviewing notes, imaging, labs, orders, and completing necessary documentation: 30 minutes.  >50% of visit spent on counseling patient and/or coordination of care.

## 2022-08-29 LAB
ATRIAL RATE - MUSE: 77 BPM
DIASTOLIC BLOOD PRESSURE - MUSE: NORMAL MMHG
INTERPRETATION ECG - MUSE: NORMAL
P AXIS - MUSE: NORMAL DEGREES
PR INTERVAL - MUSE: NORMAL MS
QRS DURATION - MUSE: 142 MS
QT - MUSE: 442 MS
QTC - MUSE: 469 MS
R AXIS - MUSE: 150 DEGREES
SYSTOLIC BLOOD PRESSURE - MUSE: NORMAL MMHG
T AXIS - MUSE: -37 DEGREES
VENTRICULAR RATE- MUSE: 68 BPM

## 2022-09-03 LAB
MDC_IDC_EPISODE_DTM: NORMAL
MDC_IDC_EPISODE_DURATION: 0 S
MDC_IDC_EPISODE_DURATION: 0 S
MDC_IDC_EPISODE_DURATION: 1 S
MDC_IDC_EPISODE_DURATION: 2 S
MDC_IDC_EPISODE_ID: 4018
MDC_IDC_EPISODE_ID: 4019
MDC_IDC_EPISODE_ID: 4020
MDC_IDC_EPISODE_ID: 4021
MDC_IDC_EPISODE_ID: 4022
MDC_IDC_EPISODE_ID: 4023
MDC_IDC_EPISODE_ID: 4024
MDC_IDC_EPISODE_ID: 4025
MDC_IDC_EPISODE_ID: 4026
MDC_IDC_EPISODE_ID: 4027
MDC_IDC_EPISODE_ID: 4028
MDC_IDC_EPISODE_ID: 4029
MDC_IDC_EPISODE_ID: 4030
MDC_IDC_EPISODE_ID: 4031
MDC_IDC_EPISODE_ID: 4032
MDC_IDC_EPISODE_TYPE: NORMAL
MDC_IDC_LEAD_IMPLANT_DT: NORMAL
MDC_IDC_LEAD_IMPLANT_DT: NORMAL
MDC_IDC_LEAD_LOCATION: NORMAL
MDC_IDC_LEAD_LOCATION: NORMAL
MDC_IDC_LEAD_LOCATION_DETAIL_1: NORMAL
MDC_IDC_LEAD_LOCATION_DETAIL_1: NORMAL
MDC_IDC_LEAD_MFG: NORMAL
MDC_IDC_LEAD_MFG: NORMAL
MDC_IDC_LEAD_MODEL: NORMAL
MDC_IDC_LEAD_MODEL: NORMAL
MDC_IDC_LEAD_POLARITY_TYPE: NORMAL
MDC_IDC_LEAD_POLARITY_TYPE: NORMAL
MDC_IDC_LEAD_SERIAL: NORMAL
MDC_IDC_LEAD_SERIAL: NORMAL
MDC_IDC_LEAD_SPECIAL_FUNCTION: NORMAL
MDC_IDC_LEAD_SPECIAL_FUNCTION: NORMAL
MDC_IDC_MSMT_BATTERY_DTM: NORMAL
MDC_IDC_MSMT_BATTERY_REMAINING_LONGEVITY: 138 MO
MDC_IDC_MSMT_BATTERY_RRT_TRIGGER: 2.62
MDC_IDC_MSMT_BATTERY_STATUS: NORMAL
MDC_IDC_MSMT_BATTERY_VOLTAGE: 3.02 V
MDC_IDC_MSMT_LEADCHNL_RA_IMPEDANCE_VALUE: 323 OHM
MDC_IDC_MSMT_LEADCHNL_RA_IMPEDANCE_VALUE: 418 OHM
MDC_IDC_MSMT_LEADCHNL_RV_IMPEDANCE_VALUE: 361 OHM
MDC_IDC_MSMT_LEADCHNL_RV_IMPEDANCE_VALUE: 456 OHM
MDC_IDC_MSMT_LEADCHNL_RV_PACING_THRESHOLD_AMPLITUDE: 0.5 V
MDC_IDC_MSMT_LEADCHNL_RV_PACING_THRESHOLD_PULSEWIDTH: 0.4 MS
MDC_IDC_MSMT_LEADCHNL_RV_SENSING_INTR_AMPL: 7 MV
MDC_IDC_PG_IMPLANT_DTM: NORMAL
MDC_IDC_PG_MFG: NORMAL
MDC_IDC_PG_MODEL: NORMAL
MDC_IDC_PG_SERIAL: NORMAL
MDC_IDC_PG_TYPE: NORMAL
MDC_IDC_SESS_CLINIC_NAME: NORMAL
MDC_IDC_SESS_DTM: NORMAL
MDC_IDC_SESS_TYPE: NORMAL
MDC_IDC_SET_BRADY_HYSTRATE: NORMAL
MDC_IDC_SET_BRADY_LOWRATE: 60 {BEATS}/MIN
MDC_IDC_SET_BRADY_MAX_SENSOR_RATE: 130 {BEATS}/MIN
MDC_IDC_SET_BRADY_MODE: NORMAL
MDC_IDC_SET_LEADCHNL_RA_SENSING_ANODE_ELECTRODE_1: NORMAL
MDC_IDC_SET_LEADCHNL_RA_SENSING_ANODE_LOCATION_1: NORMAL
MDC_IDC_SET_LEADCHNL_RA_SENSING_CATHODE_ELECTRODE_1: NORMAL
MDC_IDC_SET_LEADCHNL_RA_SENSING_CATHODE_LOCATION_1: NORMAL
MDC_IDC_SET_LEADCHNL_RA_SENSING_POLARITY: NORMAL
MDC_IDC_SET_LEADCHNL_RA_SENSING_SENSITIVITY: NORMAL
MDC_IDC_SET_LEADCHNL_RV_PACING_AMPLITUDE: 2 V
MDC_IDC_SET_LEADCHNL_RV_PACING_ANODE_ELECTRODE_1: NORMAL
MDC_IDC_SET_LEADCHNL_RV_PACING_ANODE_LOCATION_1: NORMAL
MDC_IDC_SET_LEADCHNL_RV_PACING_CAPTURE_MODE: NORMAL
MDC_IDC_SET_LEADCHNL_RV_PACING_CATHODE_ELECTRODE_1: NORMAL
MDC_IDC_SET_LEADCHNL_RV_PACING_CATHODE_LOCATION_1: NORMAL
MDC_IDC_SET_LEADCHNL_RV_PACING_POLARITY: NORMAL
MDC_IDC_SET_LEADCHNL_RV_PACING_PULSEWIDTH: 0.4 MS
MDC_IDC_SET_LEADCHNL_RV_SENSING_ANODE_ELECTRODE_1: NORMAL
MDC_IDC_SET_LEADCHNL_RV_SENSING_ANODE_LOCATION_1: NORMAL
MDC_IDC_SET_LEADCHNL_RV_SENSING_CATHODE_ELECTRODE_1: NORMAL
MDC_IDC_SET_LEADCHNL_RV_SENSING_CATHODE_LOCATION_1: NORMAL
MDC_IDC_SET_LEADCHNL_RV_SENSING_POLARITY: NORMAL
MDC_IDC_SET_LEADCHNL_RV_SENSING_SENSITIVITY: 0.9 MV
MDC_IDC_SET_ZONE_DETECTION_INTERVAL: 400 MS
MDC_IDC_SET_ZONE_TYPE: NORMAL
MDC_IDC_STAT_BRADY_AP_VP_PERCENT: 0 %
MDC_IDC_STAT_BRADY_AP_VS_PERCENT: 0 %
MDC_IDC_STAT_BRADY_AS_VP_PERCENT: 76.92 %
MDC_IDC_STAT_BRADY_AS_VS_PERCENT: 23.08 %
MDC_IDC_STAT_BRADY_DTM_END: NORMAL
MDC_IDC_STAT_BRADY_DTM_START: NORMAL
MDC_IDC_STAT_BRADY_RA_PERCENT_PACED: 0 %
MDC_IDC_STAT_BRADY_RV_PERCENT_PACED: 76.92 %
MDC_IDC_STAT_EPISODE_RECENT_COUNT: 0
MDC_IDC_STAT_EPISODE_RECENT_COUNT: 0
MDC_IDC_STAT_EPISODE_RECENT_COUNT: 172
MDC_IDC_STAT_EPISODE_RECENT_COUNT_DTM_END: NORMAL
MDC_IDC_STAT_EPISODE_RECENT_COUNT_DTM_START: NORMAL
MDC_IDC_STAT_EPISODE_TOTAL_COUNT: 0
MDC_IDC_STAT_EPISODE_TOTAL_COUNT: 179
MDC_IDC_STAT_EPISODE_TOTAL_COUNT: 3691
MDC_IDC_STAT_EPISODE_TOTAL_COUNT_DTM_END: NORMAL
MDC_IDC_STAT_EPISODE_TOTAL_COUNT_DTM_START: NORMAL
MDC_IDC_STAT_EPISODE_TYPE: NORMAL

## 2022-09-06 ENCOUNTER — LAB (OUTPATIENT)
Dept: LAB | Facility: CLINIC | Age: 62
End: 2022-09-06
Payer: COMMERCIAL

## 2022-09-06 DIAGNOSIS — I48.0 PAROXYSMAL ATRIAL FIBRILLATION (H): ICD-10-CM

## 2022-09-06 DIAGNOSIS — Q21.0 VSD (VENTRICULAR SEPTAL DEFECT): ICD-10-CM

## 2022-09-06 DIAGNOSIS — I47.19 ATRIAL TACHYCARDIA (H): ICD-10-CM

## 2022-09-06 DIAGNOSIS — I50.22 CHRONIC SYSTOLIC HEART FAILURE (H): ICD-10-CM

## 2022-09-06 LAB
ANION GAP SERPL CALCULATED.3IONS-SCNC: 14 MMOL/L (ref 7–15)
BUN SERPL-MCNC: 21.9 MG/DL (ref 8–23)
CALCIUM SERPL-MCNC: 9.8 MG/DL (ref 8.8–10.2)
CHLORIDE SERPL-SCNC: 92 MMOL/L (ref 98–107)
CREAT SERPL-MCNC: 0.94 MG/DL (ref 0.51–1.17)
DEPRECATED HCO3 PLAS-SCNC: 29 MMOL/L (ref 22–29)
GFR SERPL CREATININE-BSD FRML MDRD: 69 ML/MIN/1.73M2
GLUCOSE SERPL-MCNC: 133 MG/DL (ref 70–99)
POTASSIUM SERPL-SCNC: 4.1 MMOL/L (ref 3.4–5.3)
SODIUM SERPL-SCNC: 135 MMOL/L (ref 136–145)

## 2022-09-06 PROCEDURE — 36415 COLL VENOUS BLD VENIPUNCTURE: CPT

## 2022-09-06 PROCEDURE — 80048 BASIC METABOLIC PNL TOTAL CA: CPT

## 2022-09-13 NOTE — PROGRESS NOTES
[facility]  Date of visit: 09/14/22      Reason for Visit: follow up DLBCL      Oncology HPI:   1. Complicated patient with history of Rheumatoid Arthritis status post long term treatment with methotrexate with significant complicated course with cardiac arrest, admission with hypotension, sepsis, ICU stay and intubation with subsequent additional readmission from TCU in 6/2017 for FUO with extensive work-up with eventual finding of progression cytopenias with eventual lymphoma diagnosis  2. Bone Marrow Biopsy: 7/12/2017: Highly suspicious for involvement by B cell lymphoma with foci of large atypical CD20+ cells  3. PET CT 7/19/2017: Extensive activity: Right paratracheal lesion with SUV 28, many liver lesions with SUV 15, cardiac lesion intraarterial septum, numerous bone lesions.  4. 7/21 Liver Biopsy: Consistent with Diffuse Large B Cell Lymphoma non-germinal center phenotype  -- FISH for myc, bcl2, bcl6 negative for double-hit lymphoma  5. IPI based on Age < 60 (0 points) + PS 2 (1 + point) + Stage IV Disease (1 point) + Extranodal disease > 1 site (1 point) + elevated LDH (1 point) = 4 Poor Risk Disease  6. Cycle 1 given as R-EPOCH Day 1 = 7/27/2017  -- complications of transfusion dependence and need for acute rehab placement (likely more result of extensive hospital stays)  - LP negative for CNS involvement 8/23/2017  7. Cycle 2: Transition to R-CHOP Day 1 = 8/22/2017  - Day 15 high dose MTX for CNS prophylaxis  - complicated by significant fluid overload and PICC associated DVT  8. Cycle #3 Day 1 = 9/20/2017 Post Cycle 3 PET CR. Cycle 4 10/11/2017 + IT prophylaxis, Cycle 5 11/8/17, Cycle 6 11/29/2017 + IT prophylaxis  -- Post Treatment completion PET 1/15/2018: Complete Remission      Interval history:   Amelia is here for follow up  Neuropathy persists, worse in right leg. This goes up her entire leg. She has pressure points that affects how/ where she can sit. Left  leg more in foot. Left hand numb since her arrest/ infiltrate.   Energy is low, still waiting to feel better. Also has some heart failure with shortness of breath and high blood pressure. Allergies are bad this time of year. No fevers or recent infections. No night sweats. Appetite is good, weight is stable. Occasional loose stools, unclear trigger. No headaches or dizziness, lightheaded when standing too fast.   Has an electic bike-- uses 3x a week, using the electric feature less and less. Continues to try to increase her exercise.         Current Outpatient Medications   Medication Sig Dispense Refill     acetaminophen (TYLENOL) 500 MG tablet Take 500 mg by mouth every 6 hours as needed for mild pain       alendronate (FOSAMAX) 70 MG tablet TAKE 1 TABLET(70 MG) BY MOUTH EVERY 7 DAYS 12 tablet 3     apixaban ANTICOAGULANT (ELIQUIS ANTICOAGULANT) 5 MG tablet Take 1 tablet (5 mg) by mouth 2 times daily 180 tablet 3     atenolol (TENORMIN) 25 MG tablet 50 mg in AM and 25 mg in the evening 270 tablet 4     calcium carbonate 600 mg-vitamin D 400 units (CALTRATE) 600-400 MG-UNIT per tablet Take 2 tablets by mouth daily       digoxin (LANOXIN) 125 MCG tablet Take 1 tablet (125 mcg) by mouth daily 90 tablet 3     furosemide (LASIX) 40 MG tablet Take 1 tablet (40 mg) by mouth daily 90 tablet 3     gabapentin (NEURONTIN) 100 MG capsule Take 1 capsule AM + 1 capsule afternoon + 2 capsules at bedtime 360 capsule 1     hydroxychloroquine (PLAQUENIL) 200 MG tablet Take 1 tablet (200 mg) by mouth daily 90 tablet 3     loratadine (CLARITIN) 10 MG tablet Take 10 mg by mouth daily       multivitamin, therapeutic with minerals (THERA-VIT-M) TABS tablet Take 1 tablet by mouth daily 30 each 0     predniSONE (DELTASONE) 1 MG tablet Take 3 tablets (3 mg) by mouth daily 270 tablet 3     spironolactone (ALDACTONE) 25 MG tablet Take 1 tablet (25 mg) by mouth daily 90 tablet 3     STATIN NOT PRESCRIBED (INTENTIONAL) Please choose reason not  prescribed, below         Allergies   Allergen Reactions     Amiodarone Other (See Comments) and Difficulty breathing     Lethargic and had trouble breathing- occurred in 2017     Blood Transfusion Related (Informational Only) Hives     Hives with platelets. Give benadryl premedication.     Metoprolol Other (See Comments)     Pt and  report that metoprolol does not work for her and also reports feeling unwell with this medication. She has been able to tolerate atenolol, which as worked in controlling her HR.      Other [No Clinical Screening - See Comments]      Penicillin Allergy Skin Test not performed, see antimicrobial management team progress note 7/5/17.       Penicillins      Tape [Adhesive Tape] Rash         Physical Exam:  /89   Pulse 82   Temp 98  F (36.7  C) (Oral)   Resp 16   Wt 54.9 kg (121 lb)   SpO2 97%   BMI 25.03 kg/m    General: No acute distress.  HEENT: EOMI, PERRL. Sclerae are anicteric. Oral mucosa is pink and moist with no lesions or thrush.   Lymph: Neck is supple with no lymphadenopathy in the cervical or supraclavicular areas.   Heart: Regular rate and rhythm.   Lungs: Clear to auscultation bilaterally. Pacemaker protruding left chest wall  Abdomen: Bowel sounds present, soft, nontender with no palpable hepatosplenomegaly or masses.   Extremities: No lower extremity edema noted bilaterally.   Neuro: Alert and oriented x3, CN grossly intact, steady gait  Skin: No rashes, petechiae, or bruising noted on exposed skin.      Labs:     I personally reviewed the following labs:    Most Recent 3 CBC's:  Recent Labs   Lab Test 09/14/22  1338 04/27/22  1520 04/20/22  1507   WBC 5.9 4.4 6.4   HGB 14.8 12.9 13.2   * 103* 102*   * 87* 116*     Most Recent 3 BMP's:  Recent Labs   Lab Test 09/14/22  1338 09/06/22  1116 07/20/22  1351   * 135* 136   POTASSIUM 3.7 4.1 3.9   CHLORIDE 92* 92* 101   CO2 26 29 28   BUN 25.3* 21.9 31*   CR 1.12 0.94 1.09   ANIONGAP 13 14 7    VIKTORIYA 9.5 9.8 9.3   * 133* 107*     Most Recent 2 LFT's:  Recent Labs   Lab Test 09/14/22  1338 04/20/22  1514 09/16/21  1417   AST 43 38 28   ALT 22 44 37   ALKPHOS 127  --  133   BILITOTAL 1.7*  --  1.3           Imaging: n/a      Impression/plan:     61 year old woman with history of rhematoid arthritis and history of stage 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 She continues in CR. No signs of disease recurrence or progression.     # DLBCL  s/p 1 REPOCH and 5 RCHOP. Now in complete remission. No evidence of relapse by history or physical exam.  We will see her next year with labs and physical exam.  - Survivorship follow up, see her 1 year with labs       # Rheumatoid arthritis  She follows with rheumatology. Continues on pred and plaquinel. Follow up on 9/16       # A Fib with pacemaker  Follows with cardiology. Is on optimal medical therapy. Has been trying to lose weight and increase exercise. Has been through cardiac rehab.         # Neuropathy  Continues on gabapentin. Stable. No worsening but no significant improvement either            Zuleima Lin ACNP-BC    40 minutes spent on the date of the encounter doing chart review, review of test results, interpretation of tests, patient visit and documentation

## 2022-09-14 ENCOUNTER — LAB (OUTPATIENT)
Dept: LAB | Facility: CLINIC | Age: 62
End: 2022-09-14
Attending: INTERNAL MEDICINE
Payer: COMMERCIAL

## 2022-09-14 ENCOUNTER — ONCOLOGY VISIT (OUTPATIENT)
Dept: ONCOLOGY | Facility: CLINIC | Age: 62
End: 2022-09-14
Attending: INTERNAL MEDICINE
Payer: COMMERCIAL

## 2022-09-14 VITALS
TEMPERATURE: 98 F | HEART RATE: 82 BPM | OXYGEN SATURATION: 97 % | RESPIRATION RATE: 16 BRPM | WEIGHT: 121 LBS | DIASTOLIC BLOOD PRESSURE: 89 MMHG | BODY MASS INDEX: 25.03 KG/M2 | SYSTOLIC BLOOD PRESSURE: 129 MMHG

## 2022-09-14 DIAGNOSIS — C83.30 DIFFUSE LARGE B-CELL LYMPHOMA, UNSPECIFIED BODY REGION (H): Primary | ICD-10-CM

## 2022-09-14 DIAGNOSIS — C83.30 DIFFUSE LARGE B-CELL LYMPHOMA, UNSPECIFIED BODY REGION (H): ICD-10-CM

## 2022-09-14 DIAGNOSIS — B07.8 COMMON WART: ICD-10-CM

## 2022-09-14 LAB
ALBUMIN SERPL BCG-MCNC: 4.3 G/DL (ref 3.5–5.2)
ALP SERPL-CCNC: 127 U/L (ref 35–129)
ALT SERPL W P-5'-P-CCNC: 22 U/L (ref 10–50)
ANION GAP SERPL CALCULATED.3IONS-SCNC: 13 MMOL/L (ref 7–15)
AST SERPL W P-5'-P-CCNC: 43 U/L (ref 10–50)
BASOPHILS # BLD AUTO: 0 10E3/UL (ref 0–0.2)
BASOPHILS NFR BLD AUTO: 1 %
BILIRUB SERPL-MCNC: 1.7 MG/DL
BUN SERPL-MCNC: 25.3 MG/DL (ref 8–23)
CALCIUM SERPL-MCNC: 9.5 MG/DL (ref 8.8–10.2)
CHLORIDE SERPL-SCNC: 92 MMOL/L (ref 98–107)
CREAT SERPL-MCNC: 1.12 MG/DL (ref 0.51–1.17)
DEPRECATED HCO3 PLAS-SCNC: 26 MMOL/L (ref 22–29)
EOSINOPHIL # BLD AUTO: 0 10E3/UL (ref 0–0.7)
EOSINOPHIL NFR BLD AUTO: 0 %
ERYTHROCYTE [DISTWIDTH] IN BLOOD BY AUTOMATED COUNT: 13.4 % (ref 10–15)
GFR SERPL CREATININE-BSD FRML MDRD: 56 ML/MIN/1.73M2
GLUCOSE SERPL-MCNC: 111 MG/DL (ref 70–99)
HCT VFR BLD AUTO: 42.4 % (ref 35–53)
HGB BLD-MCNC: 14.8 G/DL (ref 11.7–17.7)
IMM GRANULOCYTES # BLD: 0 10E3/UL
IMM GRANULOCYTES NFR BLD: 0 %
LDH SERPL L TO P-CCNC: 336 U/L (ref 0–250)
LYMPHOCYTES # BLD AUTO: 0.4 10E3/UL (ref 0.8–5.3)
LYMPHOCYTES NFR BLD AUTO: 7 %
MCH RBC QN AUTO: 35.5 PG (ref 26.5–33)
MCHC RBC AUTO-ENTMCNC: 34.9 G/DL (ref 31.5–36.5)
MCV RBC AUTO: 102 FL (ref 78–100)
MONOCYTES # BLD AUTO: 0.5 10E3/UL (ref 0–1.3)
MONOCYTES NFR BLD AUTO: 9 %
NEUTROPHILS # BLD AUTO: 4.9 10E3/UL (ref 1.6–8.3)
NEUTROPHILS NFR BLD AUTO: 83 %
NRBC # BLD AUTO: 0 10E3/UL
NRBC BLD AUTO-RTO: 0 /100
PLATELET # BLD AUTO: 106 10E3/UL (ref 150–450)
POTASSIUM SERPL-SCNC: 3.7 MMOL/L (ref 3.4–5.3)
PROT SERPL-MCNC: 7.4 G/DL (ref 6.4–8.3)
RBC # BLD AUTO: 4.17 10E6/UL (ref 3.8–5.9)
SODIUM SERPL-SCNC: 131 MMOL/L (ref 136–145)
WBC # BLD AUTO: 5.9 10E3/UL (ref 4–11)

## 2022-09-14 PROCEDURE — 36415 COLL VENOUS BLD VENIPUNCTURE: CPT

## 2022-09-14 PROCEDURE — 99215 OFFICE O/P EST HI 40 MIN: CPT | Performed by: NURSE PRACTITIONER

## 2022-09-14 PROCEDURE — 85014 HEMATOCRIT: CPT

## 2022-09-14 PROCEDURE — 80053 COMPREHEN METABOLIC PANEL: CPT

## 2022-09-14 PROCEDURE — 83615 LACTATE (LD) (LDH) ENZYME: CPT

## 2022-09-14 PROCEDURE — G0463 HOSPITAL OUTPT CLINIC VISIT: HCPCS

## 2022-09-14 ASSESSMENT — PAIN SCALES - GENERAL: PAINLEVEL: MODERATE PAIN (4)

## 2022-09-14 NOTE — NURSING NOTE
Chief Complaint   Patient presents with     Labs Only     Labs drawn via  by RN in lab       Labs collected from venipuncture by RN.     Sue Parmar RN

## 2022-09-14 NOTE — NURSING NOTE
"Oncology Rooming Note    September 14, 2022 12:14 PM   Amelia Michel is a 61 year old adult who presents for:    Chief Complaint   Patient presents with     Oncology Clinic Visit     DLBCL     Initial Vitals: /89   Pulse 82   Temp 98  F (36.7  C) (Oral)   Resp 16   Wt 54.9 kg (121 lb)   SpO2 97%   BMI 25.03 kg/m   Estimated body mass index is 25.03 kg/m  as calculated from the following:    Height as of 8/26/22: 1.481 m (4' 10.3\").    Weight as of this encounter: 54.9 kg (121 lb). Body surface area is 1.5 meters squared.  Moderate Pain (4) Comment: Data Unavailable   No LMP recorded. Patient is postmenopausal.  Allergies reviewed: Yes  Medications reviewed: Yes    Medications: Medication refills not needed today.  Pharmacy name entered into [facility]: [id number] - [place], [place] - [address] AT [facility] [address]    Clinical concerns: struggling with neuropathy      Isha Israel CMA            "

## 2022-09-16 ENCOUNTER — OFFICE VISIT (OUTPATIENT)
Dept: RHEUMATOLOGY | Facility: CLINIC | Age: 62
End: 2022-09-16
Attending: INTERNAL MEDICINE
Payer: COMMERCIAL

## 2022-09-16 VITALS
DIASTOLIC BLOOD PRESSURE: 93 MMHG | OXYGEN SATURATION: 98 % | WEIGHT: 120.5 LBS | BODY MASS INDEX: 24.93 KG/M2 | HEART RATE: 86 BPM | SYSTOLIC BLOOD PRESSURE: 134 MMHG

## 2022-09-16 DIAGNOSIS — Z79.899 LONG-TERM USE OF PLAQUENIL: ICD-10-CM

## 2022-09-16 DIAGNOSIS — Z79.52 LONG TERM (CURRENT) USE OF SYSTEMIC STEROIDS: ICD-10-CM

## 2022-09-16 DIAGNOSIS — M05.79 RHEUMATOID ARTHRITIS INVOLVING MULTIPLE SITES WITH POSITIVE RHEUMATOID FACTOR (H): Primary | ICD-10-CM

## 2022-09-16 PROCEDURE — 99214 OFFICE O/P EST MOD 30 MIN: CPT | Performed by: INTERNAL MEDICINE

## 2022-09-16 PROCEDURE — G0463 HOSPITAL OUTPT CLINIC VISIT: HCPCS

## 2022-09-16 ASSESSMENT — PAIN SCALES - GENERAL: PAINLEVEL: MODERATE PAIN (4)

## 2022-09-16 NOTE — PROGRESS NOTES
Last seen: 3/10/2022    DOS: 9/16/2022    Reason for in person follow up visit: RA    [facility] - Rheumatology Clinic Visit    Amelia Michel  is a 61 year old here for rheumatoid arthritis (RA) dx 33 y/o. +CCP >331 -RF -KALYAN panel. 5-2108 XR 5-2018 DDD and facet osteoarthritis, congential fusion C2-3) prednisone since dx at 33 y/o, hydroxychloroquine (plaquenil) long-time since dx tapered by 200 mg 9-2019. Lymphoma in liver, heart, bone and mass between esophagus, left pelvic, right ankle.     Review-Dr. Dumas in [facility] for many years. She had relatively inactive disease prior to her arrest but was on methotrexate 10 mg PO per week, Arava 10 mg daily, Plaquenil 200 mg twice per day, and prednisone 3 mg daily. She has had partial fusion of her right wrist. After discharge from the hospital she was put on prednisone 5 mg daily as monotherapy. Sulfasalazine -not effective long-time, initial alan but resolved after retried. Past Dr. Cunha and Dr. 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 Treated concervatively with plaquenil and 5mg prednisone, and not interested in rituximab or other biologics. She has previously been treated with sulfasalazine and initially developed a rash which was initially thought to be a sulfa rash, but her rash resolved and did not reoccur after being placed back on sulfasalazine. She was also treated with Arava, but this discontinued during her cancer treatment. She feels that these drugs have had no effect on her symptoms. Etanercept (enbrel) in past no benefits.     March 10, 2021  Denies any fever, chills, SOB, MONTALVO, night sweats, or chest pain, high fever, cough, travel in last 14 days or exposure to covid-19 (coronavirus). Reports healthy. Follows CDC guidelines. Sinus issues for a week other then that fine. Not out tho either. Will be getting  "vaccine signing in now.     Arthritis is hand bothering her. Left worse, left hand neuropathy from antecubital IV reaction \"take out muscle and fat\". Its her thumb gets her issues. Hurts all the time.  Not swelling. Base of thumb some tingling. The same. With her history cardiac arrest and chemo does have splint, does not need OT. Walking now and mentally doing good.      Right ankle triple arthrodesis Oct 2020, learning how to walk properly. \"much better\".  Doing more more active.     Fosamax 70 mg every 7 days restarted 9-2020 per Dr. Maribell Domínguez, was on with PCP  In past \"not concerned on for too long\" will need future time off of this was off for 18 month. Prednisone 5 mg once day chronically, hydroxychloroquine (plaquenil) 200 mg once day -eye exam in Dec 2020 early catarct          9/9/2021: Ms. Michel is doing fairly well with no major complaints or RA flare ups on  mg qd+predniosne 5 mg every day. Eye exam is updated. Fully vaccinated. PCP told her to hold fosamax till further discussion in Oct.      Has pain over palms. This gets worse throughout the day. No AM stiffness. Does more chores around the house which is a trigger.     1st covid vaccine caused body ache, second dose caused fatigue.    3/9/2022:    No joint swelling, has edema due HF, joints hurt, warm shower helps, lost strength, has stiffness all day.    Eye exam is due 1/2023    Off pred since 3/3 .    Today 9/16/2022:    Amelia is here for follow up. Has pain over R foot, is back on PT,     Has problem with fine motor, it's hard to hold a plate.    Has SOB on and off due to Afib, CHF. BP is up. Seeing cardiology.    No am stiffness.    No oral ulcers.    No rash.    Back on pred 3 mg every day.    Back on fosamax.      PMH:  Injury-left thumb amputation, left finger broke  Medical-  Antiplatelet or antithrombotic long-term use  Arrhythmia  Atrial tachycardia, flutter  Paroxysmal atrial fibrillation  Arthritis  Lymphoma  Cardiac " "arrest  history of chemotherapy  Primary pulmonary hypertension  Neuropathy  Postoperative nausea and vomiting  Rheumatoid arthritis  Hyperlipidemia LDL goal <130  Lymphedema of both lower extremities  Cardiogenic shock and cardiac arrest 5/2017  Acute respiratory failure with hypoxia  Critical illness myopathy  Sepsis  Wound of left upper extremity  Diffuse large B-cell lymphoma of extranodal site  Febrile neutropenia  Physical deconditioning  Palpitations  Osteoporosis  -DEXA 2018   Slowly healing wound in medial left antecubital fossa after traumatic IV  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, latter felt from lymphoma   Neuropathy of right foot with weakness after intrathecal cytarabine  perioditis   Right arm PICC blood clots during cancer treatments  Right shoulder effusion when had pericarditis       Past Surgical History:  Anesthesia cardioversion  Right wrist arthrodesis, partial fusion \"history migrated out\"  Bone marrow aspirate & biopsy  Excise lesion upper extremity - left wound excision and closure, possible submuscular transposition of ulnar nerve  Foot surgery - 4 left, 2 right  Carpal tunnel bilateral release  Repair ventricular septal defect  Transpositional ulnar nerve (elbow) left   Right shoulder effusion history     Family History:   No autoimmune disorders, psoriasis, UC, crohn's, SLE, RA, PsA, gout, autoimmune thyroid.  No MS, heart disease in family  Mother - breast cancer, hypertension, alzheimer disease-passed   Father - hypertension, lymphoma, circulatory A fib-passed  Paternal grandmother - diabetes  Siblings-younger sister heatlhy PB, younger brother think arthritis not dx PB     Social History:   No smoking or tobacco use. No alcohol use. No IVDU. None Children. Right handed. . Disability          ROS:    A comprehensive ROS was done. Positives are per HPI.      Ph. E:    BP (!) 134/93   Pulse 86   Wt 54.7 kg (120 lb 8 oz)   SpO2 98%   BMI " 24.93 kg/m      Constitutional: NAD, pleasant  Eyes: Normal EOM, conjunctiva, sclera  ENT: no oral ulcers  Neck: No mass or thyroid enlargement  : Not tested  Lymph: No cervical, supraclavicular, or epitrochlear nodes  MS: no active synovitis or joint tenderness  Skin: No rash  Neuro: grossly non focal  Psych: Normal affect.     Labs/Imaging:  Eye Exam (10/23/18)  Significant for mild H-lated nuclear cataracts.   Ocular CT was recommended for right eye.     CT Chest/Abdomen/Pelvis (9/26/18)  In this patient with a history of lymphoma:  1. No evidence of disease recurrence, consistent with complete  response per Lugano criteria.  2. Stable cardiomegaly. Improved pericardial effusion.  3. Early contrast phase with heterogeneous visceral enhancement in the  abdomen, likely secondary to cardiac dysfunction.    MRI Cardiac w/contrast (4/12/18)  Loculated exudative pericardial effusion that is beginning to organize, with diffuse  pericardial enhancement consistent with ongoing inflammation. Normal LV fxn, LVEF 54% and RVEF 49%. At  least moderate, possibly severe tricuspid regurgitation. Compared to MRI from 03/2018, effusion is largely  unchanged in size (maybe a bit smaller) but is starting to organize. No change in pericardial enhancement.    Laboratory:   Component      Latest Ref Rng & Units 3/25/2021 5/26/2021 6/23/2021 7/21/2021   WBC      4.0 - 11.0 thou/uL 5.6      RBC Count      3.80 - 5.40 mill/uL 4.31      Hemoglobin      12.0 - 16.0 g/dL 15.4      Hematocrit      35.0 - 47.0 % 44.6      MCV      80 - 100 fL 104 (H)      MCH      27.0 - 34.0 pg 35.7 (H)      MCHC      32.0 - 36.0 g/dL 34.5      RDW      11.0 - 14.5 % 14.4      Platelet Count      140 - 440 thou/uL 126 (L)      Mean Platelet Volume      7.0 - 10.0 fL 9.5      % Neutrophils      50 - 70 % 80 (H)      % Lymphocytes      20 - 40 % 10 (L)      % Monocytes      2 - 10 % 9      % Eosinophils      0 - 6 % 1      % Basophils      0 - 2 % 0      %  Immature Granulocytes      <=0 % 0      Absolute Neutrophils      2.0 - 7.7 thou/uL 4.5      Absolute Lymphocytes      0.8 - 4.4 thou/uL 0.6 (L)      Absolute Monocytes      0.0 - 0.9 thou/uL 0.5      Eosinophils Absolute      0.0 - 0.4 thou/uL 0.0      Absolute Basophils      0.0 - 0.2 thou/uL 0.0      Absolute Immature Granulocytes      <=0.0 thou/uL 0.0      Sodium      133 - 144 mmol/L  136 136 137   Potassium      3.4 - 5.3 mmol/L  3.6 4.2 3.9   Chloride      94 - 109 mmol/L  101 102 103   Carbon Dioxide      20 - 32 mmol/L  24 28 28   Anion Gap      3 - 14 mmol/L  11 6 6   Glucose      70 - 99 mg/dL  100 (H) 98 110 (H)   Urea Nitrogen      7 - 30 mg/dL  21 25 29   Creatinine      0.52 - 1.25 mg/dL  1.00 1.05 (H) 1.02   GFR Estimate      >60 mL/min/1.73m2  61 57 (L) 60 (L)   GFR Estimate If Black      >60 mL/min/1.73:m2  71 67    Calcium      8.5 - 10.1 mg/dL  9.3 10.0 9.6   Bilirubin Direct      0.0 - 0.2 mg/dL   0.5 (H)    Bilirubin Total      0.2 - 1.3 mg/dL   1.2    Albumin      3.4 - 5.0 g/dL   4.2    Protein Total      6.8 - 8.8 g/dL   7.5    Alkaline Phosphatase      40 - 150 U/L   136    ALT      0 - 50 U/L   41    AST      0 - 45 U/L   36    N-Terminal Pro Bnp      0 - 125 pg/mL  1,274 (H)     Hemoglobin A1C      0 - 5.6 %  6.2 (H)     Immunoglobulin G      700-1,700 mg/dL 1,179          Impression/Plan:  1. Seropositive rheumatoid arthritis (, RF 31) with multiple joint disability including MTP fusion 1-5 bilaterally, partial right wrist fusion, subluxation and ulnar deformity of MCP's. Rheumatoid arthritis (RA) controlled. Continue prednisone at 3 mg every day (more pain, stiffness off prednisone when she was off), and hydroxychloroquine (plaquenil) 200 mg once a day.     2. Long-term hydroxychloroquine (plaquenil) use since 6-2017, no toxicity so far, reviewed AAO guidelines, repeat every year.    3. Diffuse large B-cell lymphoma status-post 1 cycle R-EPOCH, 6 cycles R-CHOP; Neuropathy of  lower extremities attributed to lymphoma improved on gabapentin 200 mg TID-tolerating. Loculated pericardial effusion, etiology unclear (lymphoma). No symptoms now.     per oncology for surveillance Seeing next week    4. Osteoporosis, chronic long-term corticosteroid use with cushingoid appearance. Received 6-7 years of alendronate in early 2000s. Restarted alendronate in 1/2019, off 3-2020 to 9-2020 then restarted 9-2020. DEXA  T-2.6 thoracic. If > 5 yr higher risk atypical Fx. Off fosamax at last visit given improvement of bone density in 3/2021 but on chronic steroid tx, did refer to endocrinology to manage steroid induced osteoporosis and endocrinology resumed it in 11/2021. Calcium and GFR normal  .        Plan:      Continue prednisone at 3 mg a day    Return in 12 months (in person)      Maribell Domínguez MD

## 2022-09-16 NOTE — NURSING NOTE
Chief Complaint   Patient presents with     RECHECK     Follow up     Blood pressure (!) 134/93, pulse 86, weight 54.7 kg (120 lb 8 oz), SpO2 98 %, not currently breastfeeding.    Peyton Richardson

## 2022-09-16 NOTE — LETTER
9/16/2022       RE: Amelia Michel  [address]  [facility] [address]     Dear Colleague,    Thank you for referring your patient, Amelia Michel, to the [facility] [place] at [facility]. Please see a copy of my visit note below.    Last seen: 3/10/2022    DOS: 9/16/2022    Reason for in person follow up visit: RA    [facility] - Rheumatology Clinic Visit    Amelia Michel  is a 61 year old here for rheumatoid arthritis (RA) dx 33 y/o. +CCP >331 -RF -KALYAN panel. 5-2108 XR 5-2018 DDD and facet osteoarthritis, congential fusion C2-3) prednisone since dx at 33 y/o, hydroxychloroquine (plaquenil) long-time since dx tapered by 200 mg 9-2019. Lymphoma in liver, heart, bone and mass between esophagus, left pelvic, right ankle.     Review-Dr. Dumas in [facility] for many years. She had relatively inactive disease prior to her arrest but was on methotrexate 10 mg PO per week, Arava 10 mg daily, Plaquenil 200 mg twice per day, and prednisone 3 mg daily. She has had partial fusion of her right wrist. After discharge from the hospital she was put on prednisone 5 mg daily as monotherapy. Sulfasalazine -not effective long-time, initial alan but resolved after retried. Past Dr. Cunha and Dr. 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 Treated concervatively with plaquenil and 5mg prednisone, and not interested in rituximab or other biologics. She has previously been treated with sulfasalazine and initially developed a rash which was initially thought to be a sulfa rash, but her rash resolved and did not reoccur after being placed back on sulfasalazine. She was also treated with Arava, but this discontinued during her cancer treatment. She feels that these drugs have had no effect on her symptoms. Etanercept  "(enbrel) in past no benefits.     March 10, 2021  Denies any fever, chills, SOB, MONTALVO, night sweats, or chest pain, high fever, cough, travel in last 14 days or exposure to covid-19 (coronavirus). Reports healthy. Follows CDC guidelines. Sinus issues for a week other then that fine. Not out tho either. Will be getting vaccine signing in now.     Arthritis is hand bothering her. Left worse, left hand neuropathy from antecubital IV reaction \"take out muscle and fat\". Its her thumb gets her issues. Hurts all the time.  Not swelling. Base of thumb some tingling. The same. With her history cardiac arrest and chemo does have splint, does not need OT. Walking now and mentally doing good.      Right ankle triple arthrodesis Oct 2020, learning how to walk properly. \"much better\".  Doing more more active.     Fosamax 70 mg every 7 days restarted 9-2020 per Dr. Maribell Domínguez, was on with PCP  In past \"not concerned on for too long\" will need future time off of this was off for 18 month. Prednisone 5 mg once day chronically, hydroxychloroquine (plaquenil) 200 mg once day -eye exam in Dec 2020 early catarct          9/9/2021: Ms. Michel is doing fairly well with no major complaints or RA flare ups on  mg qd+predniosne 5 mg every day. Eye exam is updated. Fully vaccinated. PCP told her to hold fosamax till further discussion in Oct.      Has pain over palms. This gets worse throughout the day. No AM stiffness. Does more chores around the house which is a trigger.     1st covid vaccine caused body ache, second dose caused fatigue.    3/9/2022:    No joint swelling, has edema due HF, joints hurt, warm shower helps, lost strength, has stiffness all day.    Eye exam is due 1/2023    Off pred since 3/3 .    Today 9/16/2022:    Amelia is here for follow up. Has pain over R foot, is back on PT,     Has problem with fine motor, it's hard to hold a plate.    Has SOB on and off due to Afib, CHF. BP is up. Seeing cardiology.    No " "am stiffness.    No oral ulcers.    No rash.    Back on pred 3 mg every day.    Back on fosamax.      PMH:  Injury-left thumb amputation, left finger broke  Medical-  Antiplatelet or antithrombotic long-term use  Arrhythmia  Atrial tachycardia, flutter  Paroxysmal atrial fibrillation  Arthritis  Lymphoma  Cardiac arrest  history of chemotherapy  Primary pulmonary hypertension  Neuropathy  Postoperative nausea and vomiting  Rheumatoid arthritis  Hyperlipidemia LDL goal <130  Lymphedema of both lower extremities  Cardiogenic shock and cardiac arrest 5/2017  Acute respiratory failure with hypoxia  Critical illness myopathy  Sepsis  Wound of left upper extremity  Diffuse large B-cell lymphoma of extranodal site  Febrile neutropenia  Physical deconditioning  Palpitations  Osteoporosis  -DEXA 2018   Slowly healing wound in medial left antecubital fossa after traumatic IV  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, latter felt from lymphoma   Neuropathy of right foot with weakness after intrathecal cytarabine  perioditis   Right arm PICC blood clots during cancer treatments  Right shoulder effusion when had pericarditis       Past Surgical History:  Anesthesia cardioversion  Right wrist arthrodesis, partial fusion \"history migrated out\"  Bone marrow aspirate & biopsy  Excise lesion upper extremity - left wound excision and closure, possible submuscular transposition of ulnar nerve  Foot surgery - 4 left, 2 right  Carpal tunnel bilateral release  Repair ventricular septal defect  Transpositional ulnar nerve (elbow) left   Right shoulder effusion history     Family History:   No autoimmune disorders, psoriasis, UC, crohn's, SLE, RA, PsA, gout, autoimmune thyroid.  No MS, heart disease in family  Mother - breast cancer, hypertension, alzheimer disease-passed   Father - hypertension, lymphoma, circulatory A fib-passed  Paternal grandmother - diabetes  Siblings-younger sister heatlhy PB, younger " brother think arthritis not dx PB     Social History:   No smoking or tobacco use. No alcohol use. No IVDU. None Children. Right handed. . Disability          ROS:    A comprehensive ROS was done. Positives are per HPI.      Ph. E:    BP (!) 134/93   Pulse 86   Wt 54.7 kg (120 lb 8 oz)   SpO2 98%   BMI 24.93 kg/m      Constitutional: NAD, pleasant  Eyes: Normal EOM, conjunctiva, sclera  ENT: no oral ulcers  Neck: No mass or thyroid enlargement  : Not tested  Lymph: No cervical, supraclavicular, or epitrochlear nodes  MS: no active synovitis or joint tenderness  Skin: No rash  Neuro: grossly non focal  Psych: Normal affect.     Labs/Imaging:  Eye Exam (10/23/18)  Significant for mild H-lated nuclear cataracts.   Ocular CT was recommended for right eye.     CT Chest/Abdomen/Pelvis (9/26/18)  In this patient with a history of lymphoma:  1. No evidence of disease recurrence, consistent with complete  response per [place] criteria.  2. Stable cardiomegaly. Improved pericardial effusion.  3. Early contrast phase with heterogeneous visceral enhancement in the  abdomen, likely secondary to cardiac dysfunction.    MRI Cardiac w/contrast (4/12/18)  Loculated exudative pericardial effusion that is beginning to organize, with diffuse  pericardial enhancement consistent with ongoing inflammation. Normal LV fxn, LVEF 54% and RVEF 49%. At  least moderate, possibly severe tricuspid regurgitation. Compared to MRI from 03/2018, effusion is largely  unchanged in size (maybe a bit smaller) but is starting to organize. No change in pericardial enhancement.    Laboratory:   Component      Latest Ref Rng & Units 3/25/2021 5/26/2021 6/23/2021 7/21/2021   WBC      4.0 - 11.0 thou/uL 5.6      RBC Count      3.80 - 5.40 mill/uL 4.31      Hemoglobin      12.0 - 16.0 g/dL 15.4      Hematocrit      35.0 - 47.0 % 44.6      MCV      80 - 100 fL 104 (H)      MCH      27.0 - 34.0 pg 35.7 (H)      MCHC      32.0 - 36.0 g/dL 34.5       RDW      11.0 - 14.5 % 14.4      Platelet Count      140 - 440 thou/uL 126 (L)      Mean Platelet Volume      7.0 - 10.0 fL 9.5      % Neutrophils      50 - 70 % 80 (H)      % Lymphocytes      20 - 40 % 10 (L)      % Monocytes      2 - 10 % 9      % Eosinophils      0 - 6 % 1      % Basophils      0 - 2 % 0      % Immature Granulocytes      <=0 % 0      Absolute Neutrophils      2.0 - 7.7 thou/uL 4.5      Absolute Lymphocytes      0.8 - 4.4 thou/uL 0.6 (L)      Absolute Monocytes      0.0 - 0.9 thou/uL 0.5      Eosinophils Absolute      0.0 - 0.4 thou/uL 0.0      Absolute Basophils      0.0 - 0.2 thou/uL 0.0      Absolute Immature Granulocytes      <=0.0 thou/uL 0.0      Sodium      133 - 144 mmol/L  136 136 137   Potassium      3.4 - 5.3 mmol/L  3.6 4.2 3.9   Chloride      94 - 109 mmol/L  101 102 103   Carbon Dioxide      20 - 32 mmol/L  24 28 28   Anion Gap      3 - 14 mmol/L  11 6 6   Glucose      70 - 99 mg/dL  100 (H) 98 110 (H)   Urea Nitrogen      7 - 30 mg/dL  21 25 29   Creatinine      0.52 - 1.25 mg/dL  1.00 1.05 (H) 1.02   GFR Estimate      >60 mL/min/1.73m2  61 57 (L) 60 (L)   GFR Estimate If Black      >60 mL/min/1.73:m2  71 67    Calcium      8.5 - 10.1 mg/dL  9.3 10.0 9.6   Bilirubin Direct      0.0 - 0.2 mg/dL   0.5 (H)    Bilirubin Total      0.2 - 1.3 mg/dL   1.2    Albumin      3.4 - 5.0 g/dL   4.2    Protein Total      6.8 - 8.8 g/dL   7.5    Alkaline Phosphatase      40 - 150 U/L   136    ALT      0 - 50 U/L   41    AST      0 - 45 U/L   36    N-Terminal Pro Bnp      0 - 125 pg/mL  1,274 (H)     Hemoglobin A1C      0 - 5.6 %  6.2 (H)     Immunoglobulin G      700-1,700 mg/dL 1,179          Impression/Plan:  1. Seropositive rheumatoid arthritis (, RF 31) with multiple joint disability including MTP fusion 1-5 bilaterally, partial right wrist fusion, subluxation and ulnar deformity of MCP's. Rheumatoid arthritis (RA) controlled. Continue prednisone at 3 mg every day (more pain, stiffness  off prednisone when she was off), and hydroxychloroquine (plaquenil) 200 mg once a day.     2. Long-term hydroxychloroquine (plaquenil) use since 6-2017, no toxicity so far, reviewed AAO guidelines, repeat every year.    3. Diffuse large B-cell lymphoma status-post 1 cycle R-EPOCH, 6 cycles R-CHOP; Neuropathy of lower extremities attributed to lymphoma improved on gabapentin 200 mg TID-tolerating. Loculated pericardial effusion, etiology unclear (lymphoma). No symptoms now.     per oncology for surveillance Seeing next week    4. Osteoporosis, chronic long-term corticosteroid use with cushingoid appearance. Received 6-7 years of alendronate in early 2000s. Restarted alendronate in 1/2019, off 3-2020 to 9-2020 then restarted 9-2020. DEXA  T-2.6 thoracic. If > 5 yr higher risk atypical Fx. Off fosamax at last visit given improvement of bone density in 3/2021 but on chronic steroid tx, did refer to endocrinology to manage steroid induced osteoporosis and endocrinology resumed it in 11/2021. Calcium and GFR normal  .        Plan:      Continue prednisone at 3 mg a day    Return in 12 months (in person)      Maribell Domínguez MD

## 2022-09-25 ENCOUNTER — ANCILLARY PROCEDURE (OUTPATIENT)
Dept: CARDIOLOGY | Facility: CLINIC | Age: 62
End: 2022-09-25
Attending: INTERNAL MEDICINE
Payer: COMMERCIAL

## 2022-09-25 DIAGNOSIS — R00.1 BRADYCARDIA: ICD-10-CM

## 2022-09-25 PROCEDURE — 93296 REM INTERROG EVL PM/IDS: CPT

## 2022-09-25 PROCEDURE — 93294 REM INTERROG EVL PM/LDLS PM: CPT | Performed by: INTERNAL MEDICINE

## 2022-10-06 ENCOUNTER — TELEPHONE (OUTPATIENT)
Dept: ENDOCRINOLOGY | Facility: CLINIC | Age: 62
End: 2022-10-06

## 2022-10-06 NOTE — TELEPHONE ENCOUNTER
Called and LM for patient to return call. Please assist patient with a follow up visit with Dr. Shrestha. Patient will need a lab appointment and Thyroid ultrasound prior to follow up appt.

## 2022-10-09 ENCOUNTER — HEALTH MAINTENANCE LETTER (OUTPATIENT)
Age: 62
End: 2022-10-09

## 2022-10-10 LAB
MDC_IDC_EPISODE_DTM: NORMAL
MDC_IDC_EPISODE_DURATION: 1 S
MDC_IDC_EPISODE_DURATION: 10 S
MDC_IDC_EPISODE_DURATION: 2 S
MDC_IDC_EPISODE_ID: 4033
MDC_IDC_EPISODE_ID: 4034
MDC_IDC_EPISODE_ID: 4035
MDC_IDC_EPISODE_TYPE: NORMAL
MDC_IDC_LEAD_IMPLANT_DT: NORMAL
MDC_IDC_LEAD_IMPLANT_DT: NORMAL
MDC_IDC_LEAD_LOCATION: NORMAL
MDC_IDC_LEAD_LOCATION: NORMAL
MDC_IDC_LEAD_LOCATION_DETAIL_1: NORMAL
MDC_IDC_LEAD_LOCATION_DETAIL_1: NORMAL
MDC_IDC_LEAD_MFG: NORMAL
MDC_IDC_LEAD_MFG: NORMAL
MDC_IDC_LEAD_MODEL: NORMAL
MDC_IDC_LEAD_MODEL: NORMAL
MDC_IDC_LEAD_POLARITY_TYPE: NORMAL
MDC_IDC_LEAD_POLARITY_TYPE: NORMAL
MDC_IDC_LEAD_SERIAL: NORMAL
MDC_IDC_LEAD_SERIAL: NORMAL
MDC_IDC_LEAD_SPECIAL_FUNCTION: NORMAL
MDC_IDC_LEAD_SPECIAL_FUNCTION: NORMAL
MDC_IDC_MSMT_BATTERY_DTM: NORMAL
MDC_IDC_MSMT_BATTERY_REMAINING_LONGEVITY: 136 MO
MDC_IDC_MSMT_BATTERY_RRT_TRIGGER: 2.62
MDC_IDC_MSMT_BATTERY_STATUS: NORMAL
MDC_IDC_MSMT_BATTERY_VOLTAGE: 3.02 V
MDC_IDC_MSMT_LEADCHNL_RA_IMPEDANCE_VALUE: 323 OHM
MDC_IDC_MSMT_LEADCHNL_RA_IMPEDANCE_VALUE: 418 OHM
MDC_IDC_MSMT_LEADCHNL_RV_IMPEDANCE_VALUE: 361 OHM
MDC_IDC_MSMT_LEADCHNL_RV_IMPEDANCE_VALUE: 456 OHM
MDC_IDC_MSMT_LEADCHNL_RV_PACING_THRESHOLD_AMPLITUDE: 0.62 V
MDC_IDC_MSMT_LEADCHNL_RV_PACING_THRESHOLD_PULSEWIDTH: 0.4 MS
MDC_IDC_MSMT_LEADCHNL_RV_SENSING_INTR_AMPL: 7.5 MV
MDC_IDC_PG_IMPLANT_DTM: NORMAL
MDC_IDC_PG_MFG: NORMAL
MDC_IDC_PG_MODEL: NORMAL
MDC_IDC_PG_SERIAL: NORMAL
MDC_IDC_PG_TYPE: NORMAL
MDC_IDC_SESS_CLINIC_NAME: NORMAL
MDC_IDC_SESS_DTM: NORMAL
MDC_IDC_SESS_TYPE: NORMAL
MDC_IDC_SET_BRADY_HYSTRATE: NORMAL
MDC_IDC_SET_BRADY_LOWRATE: 60 {BEATS}/MIN
MDC_IDC_SET_BRADY_MAX_SENSOR_RATE: 130 {BEATS}/MIN
MDC_IDC_SET_BRADY_MODE: NORMAL
MDC_IDC_SET_LEADCHNL_RA_SENSING_ANODE_ELECTRODE_1: NORMAL
MDC_IDC_SET_LEADCHNL_RA_SENSING_ANODE_LOCATION_1: NORMAL
MDC_IDC_SET_LEADCHNL_RA_SENSING_CATHODE_ELECTRODE_1: NORMAL
MDC_IDC_SET_LEADCHNL_RA_SENSING_CATHODE_LOCATION_1: NORMAL
MDC_IDC_SET_LEADCHNL_RA_SENSING_POLARITY: NORMAL
MDC_IDC_SET_LEADCHNL_RA_SENSING_SENSITIVITY: NORMAL
MDC_IDC_SET_LEADCHNL_RV_PACING_AMPLITUDE: 2 V
MDC_IDC_SET_LEADCHNL_RV_PACING_ANODE_ELECTRODE_1: NORMAL
MDC_IDC_SET_LEADCHNL_RV_PACING_ANODE_LOCATION_1: NORMAL
MDC_IDC_SET_LEADCHNL_RV_PACING_CAPTURE_MODE: NORMAL
MDC_IDC_SET_LEADCHNL_RV_PACING_CATHODE_ELECTRODE_1: NORMAL
MDC_IDC_SET_LEADCHNL_RV_PACING_CATHODE_LOCATION_1: NORMAL
MDC_IDC_SET_LEADCHNL_RV_PACING_POLARITY: NORMAL
MDC_IDC_SET_LEADCHNL_RV_PACING_PULSEWIDTH: 0.4 MS
MDC_IDC_SET_LEADCHNL_RV_SENSING_ANODE_ELECTRODE_1: NORMAL
MDC_IDC_SET_LEADCHNL_RV_SENSING_ANODE_LOCATION_1: NORMAL
MDC_IDC_SET_LEADCHNL_RV_SENSING_CATHODE_ELECTRODE_1: NORMAL
MDC_IDC_SET_LEADCHNL_RV_SENSING_CATHODE_LOCATION_1: NORMAL
MDC_IDC_SET_LEADCHNL_RV_SENSING_POLARITY: NORMAL
MDC_IDC_SET_LEADCHNL_RV_SENSING_SENSITIVITY: 0.9 MV
MDC_IDC_SET_ZONE_DETECTION_INTERVAL: 400 MS
MDC_IDC_SET_ZONE_TYPE: NORMAL
MDC_IDC_STAT_BRADY_AP_VP_PERCENT: 0 %
MDC_IDC_STAT_BRADY_AP_VS_PERCENT: 0 %
MDC_IDC_STAT_BRADY_AS_VP_PERCENT: 62.15 %
MDC_IDC_STAT_BRADY_AS_VS_PERCENT: 37.85 %
MDC_IDC_STAT_BRADY_DTM_END: NORMAL
MDC_IDC_STAT_BRADY_DTM_START: NORMAL
MDC_IDC_STAT_BRADY_RA_PERCENT_PACED: 0 %
MDC_IDC_STAT_BRADY_RV_PERCENT_PACED: 62.15 %
MDC_IDC_STAT_EPISODE_RECENT_COUNT: 0
MDC_IDC_STAT_EPISODE_RECENT_COUNT: 1
MDC_IDC_STAT_EPISODE_RECENT_COUNT: 2
MDC_IDC_STAT_EPISODE_RECENT_COUNT_DTM_END: NORMAL
MDC_IDC_STAT_EPISODE_RECENT_COUNT_DTM_START: NORMAL
MDC_IDC_STAT_EPISODE_TOTAL_COUNT: 0
MDC_IDC_STAT_EPISODE_TOTAL_COUNT: 1
MDC_IDC_STAT_EPISODE_TOTAL_COUNT: 141
MDC_IDC_STAT_EPISODE_TOTAL_COUNT: 181
MDC_IDC_STAT_EPISODE_TOTAL_COUNT: 3691
MDC_IDC_STAT_EPISODE_TOTAL_COUNT_DTM_END: NORMAL
MDC_IDC_STAT_EPISODE_TOTAL_COUNT_DTM_START: NORMAL
MDC_IDC_STAT_EPISODE_TYPE: NORMAL

## 2022-10-24 DIAGNOSIS — I50.22 CHRONIC SYSTOLIC HEART FAILURE (H): Primary | ICD-10-CM

## 2022-11-03 ENCOUNTER — LAB (OUTPATIENT)
Dept: LAB | Facility: CLINIC | Age: 62
End: 2022-11-03
Payer: COMMERCIAL

## 2022-11-03 ENCOUNTER — OFFICE VISIT (OUTPATIENT)
Dept: CARDIOLOGY | Facility: CLINIC | Age: 62
End: 2022-11-03
Attending: NURSE PRACTITIONER
Payer: COMMERCIAL

## 2022-11-03 VITALS
SYSTOLIC BLOOD PRESSURE: 127 MMHG | BODY MASS INDEX: 26.54 KG/M2 | HEART RATE: 87 BPM | OXYGEN SATURATION: 98 % | HEIGHT: 57 IN | WEIGHT: 123 LBS | DIASTOLIC BLOOD PRESSURE: 83 MMHG

## 2022-11-03 DIAGNOSIS — I07.1 TRICUSPID VALVE INSUFFICIENCY, UNSPECIFIED ETIOLOGY: Primary | ICD-10-CM

## 2022-11-03 DIAGNOSIS — I47.19 ATRIAL TACHYCARDIA (H): ICD-10-CM

## 2022-11-03 DIAGNOSIS — I50.22 CHRONIC SYSTOLIC HEART FAILURE (H): ICD-10-CM

## 2022-11-03 DIAGNOSIS — I50.33 ACUTE ON CHRONIC DIASTOLIC HEART FAILURE (H): ICD-10-CM

## 2022-11-03 DIAGNOSIS — C83.30 DIFFUSE LARGE B-CELL LYMPHOMA, UNSPECIFIED BODY REGION (H): ICD-10-CM

## 2022-11-03 LAB
ALBUMIN SERPL BCG-MCNC: 4.4 G/DL (ref 3.5–5.2)
ALP SERPL-CCNC: 129 U/L (ref 35–129)
ALT SERPL W P-5'-P-CCNC: 25 U/L (ref 10–50)
ANION GAP SERPL CALCULATED.3IONS-SCNC: 12 MMOL/L (ref 7–15)
AST SERPL W P-5'-P-CCNC: 44 U/L (ref 10–50)
BASOPHILS # BLD AUTO: 0 10E3/UL (ref 0–0.2)
BASOPHILS NFR BLD AUTO: 1 %
BILIRUB SERPL-MCNC: 1.7 MG/DL
BUN SERPL-MCNC: 26.7 MG/DL (ref 8–23)
CALCIUM SERPL-MCNC: 10 MG/DL (ref 8.8–10.2)
CHLORIDE SERPL-SCNC: 95 MMOL/L (ref 98–107)
CREAT SERPL-MCNC: 1.12 MG/DL (ref 0.51–1.17)
DEPRECATED HCO3 PLAS-SCNC: 25 MMOL/L (ref 22–29)
EOSINOPHIL # BLD AUTO: 0 10E3/UL (ref 0–0.7)
EOSINOPHIL NFR BLD AUTO: 1 %
ERYTHROCYTE [DISTWIDTH] IN BLOOD BY AUTOMATED COUNT: 14.3 % (ref 10–15)
GFR SERPL CREATININE-BSD FRML MDRD: 56 ML/MIN/1.73M2
GLUCOSE SERPL-MCNC: 111 MG/DL (ref 70–99)
HCT VFR BLD AUTO: 44 % (ref 35–53)
HGB BLD-MCNC: 15.2 G/DL (ref 11.7–17.7)
IMM GRANULOCYTES # BLD: 0 10E3/UL
IMM GRANULOCYTES NFR BLD: 0 %
LDH SERPL L TO P-CCNC: 302 U/L (ref 0–250)
LYMPHOCYTES # BLD AUTO: 0.5 10E3/UL (ref 0.8–5.3)
LYMPHOCYTES NFR BLD AUTO: 9 %
MCH RBC QN AUTO: 35.4 PG (ref 26.5–33)
MCHC RBC AUTO-ENTMCNC: 34.5 G/DL (ref 31.5–36.5)
MCV RBC AUTO: 103 FL (ref 78–100)
MONOCYTES # BLD AUTO: 0.6 10E3/UL (ref 0–1.3)
MONOCYTES NFR BLD AUTO: 10 %
NEUTROPHILS # BLD AUTO: 4.3 10E3/UL (ref 1.6–8.3)
NEUTROPHILS NFR BLD AUTO: 79 %
NRBC # BLD AUTO: 0 10E3/UL
NRBC BLD AUTO-RTO: 0 /100
PLATELET # BLD AUTO: 100 10E3/UL (ref 150–450)
POTASSIUM SERPL-SCNC: 4.4 MMOL/L (ref 3.4–5.3)
PROT SERPL-MCNC: 7.9 G/DL (ref 6.4–8.3)
RBC # BLD AUTO: 4.29 10E6/UL (ref 3.8–5.9)
SODIUM SERPL-SCNC: 132 MMOL/L (ref 136–145)
WBC # BLD AUTO: 5.4 10E3/UL (ref 4–11)

## 2022-11-03 PROCEDURE — 80053 COMPREHEN METABOLIC PANEL: CPT | Performed by: PATHOLOGY

## 2022-11-03 PROCEDURE — 36415 COLL VENOUS BLD VENIPUNCTURE: CPT | Performed by: PATHOLOGY

## 2022-11-03 PROCEDURE — 99214 OFFICE O/P EST MOD 30 MIN: CPT | Performed by: NURSE PRACTITIONER

## 2022-11-03 PROCEDURE — G0463 HOSPITAL OUTPT CLINIC VISIT: HCPCS

## 2022-11-03 PROCEDURE — 83615 LACTATE (LD) (LDH) ENZYME: CPT | Performed by: NURSE PRACTITIONER

## 2022-11-03 PROCEDURE — 85025 COMPLETE CBC W/AUTO DIFF WBC: CPT | Performed by: PATHOLOGY

## 2022-11-03 RX ORDER — SPIRONOLACTONE 25 MG/1
50 TABLET ORAL DAILY
Qty: 90 TABLET | Refills: 3 | COMMUNITY
Start: 2022-11-03 | End: 2023-01-09

## 2022-11-03 RX ORDER — ATENOLOL 50 MG/1
50 TABLET ORAL 2 TIMES DAILY
Qty: 60 TABLET | Refills: 3 | COMMUNITY
Start: 2022-11-03 | End: 2022-12-14

## 2022-11-03 RX ORDER — FUROSEMIDE 40 MG
20 TABLET ORAL DAILY
Qty: 90 TABLET | Refills: 3 | Status: ON HOLD | COMMUNITY
Start: 2022-11-03 | End: 2022-12-05

## 2022-11-03 ASSESSMENT — PAIN SCALES - GENERAL: PAINLEVEL: NO PAIN (0)

## 2022-11-03 NOTE — NURSING NOTE
Chief Complaint   Patient presents with     Follow Up     61 year old with chronic diastolic heart failure presents for follow up with labs prior       Vitals were taken and medications reconciled.    Gonsalo Young, EMT  9:51 AM

## 2022-11-03 NOTE — LETTER
11/3/2022      RE: Amelia Michel  [address]  [facility] [address]       Dear Colleague,    Thank you for the opportunity to participate in the care of your patient, Amelia Michel, at the [facility] [place] at [facility]. Please see a copy of my visit note below.    HPI:   Amelia Michel is a 61 year old adult with a past medical history including VSD s/p repair 1969, RA, HTN, Insomnia, Atrial Tachyarrhythmias, cardiac arrest, SSS s/p PPM 12/19, DLBCL, and exudative pericardial effusion. She presents for routine CORE return.    Her cardiac history dates back to 5/17 with atrial tachyarrhythmias with LVEF 40-45%. She underwent repeat hospitalization 6/17 due to VF arrest in setting of normal angiogram and found to have DLBCL complicated by masses on the coronary cusps and LVOT. She underwent R-EPOCH one cycle and 5 subsequent cycles of R-CHOP completed in 12/17 with her course complicated by pericardial effusion treated with colcichine. She has struggled with recurrent arrhythmia issues. Prior Cardiac MRI from [id number] with normal EF. Repeat Echo consistent with findings of HFPEF with EF 50% with adequately controlled HR, persistent moderate to severe TR.     She notes minimal palpitations, but notes decreased activity due to her [facility] cancer. She denies fever, chills, lightheadedness, dizziness, chest pain, palpitations, SOB, MONTALVO, PND, orthopnea, nausea, vomiting, diarrhea, hematochezia, melena, or LE edema. Her weight remains stable at 119-121 lbs. He continues to follow a low sodium diet.  Home -122/80's, she is concerned about her persistent DBP greater than 80.    PAST MEDICAL HISTORY:  Past Medical History:   Diagnosis Date     Arthritis      Cardiac arrest (H)      History of blood clots 2017    PICC line Right arm      History of chemotherapy      History of transfusion      Hypertension      Neuropathy      Physical  deconditioning      Positive PPD, 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     VSD (ventricular septal defect)        FAMILY HISTORY:  Family History   Problem Relation Age of Onset     Breast Cancer Mother      Hypertension Mother      Alzheimer Disease Mother      Cerebrovascular Disease Mother      Hypertension Father      Cerebrovascular Disease Father      Atrial fibrillation Father      Lymphoma Father      Prostate Cancer Father      Diabetes Paternal Grandmother      Alzheimer Disease Maternal Grandmother         likely     Arthritis Maternal Grandfather      Pneumonia Maternal Grandfather        SOCIAL HISTORY:  Social History     Socioeconomic History     Marital status:      Spouse name: Romeo Michel     Number of children: 0   Occupational History     Employer: [employer]   Tobacco Use     Smoking status: Never     Smokeless tobacco: Never   Substance and Sexual Activity     Alcohol use: Yes     Comment: none     Drug use: No   Other Topics Concern     Parent/sibling w/ CABG, MI or angioplasty before 65F 55M? No     Social Determinants of Health     Intimate Partner Violence: Not At Risk     Fear of Current or Ex-Partner: No     Emotionally Abused: No     Physically Abused: No     Sexually Abused: No       CURRENT MEDICATIONS:  Outpatient Medications Prior to Visit   Medication Sig Dispense Refill     acetaminophen (TYLENOL) 500 MG tablet Take 500 mg by mouth every 6 hours as needed for mild pain       alendronate (FOSAMAX) 70 MG tablet TAKE 1 TABLET(70 MG) BY MOUTH EVERY 7 DAYS 12 tablet 3     apixaban ANTICOAGULANT (ELIQUIS ANTICOAGULANT) 5 MG tablet Take 1 tablet (5 mg) by mouth 2 times daily 180 tablet 3     atenolol (TENORMIN) 50 MG tablet Take 1 tablet (50 mg) by mouth 2 times daily 60 tablet 3     calcium carbonate 600 mg-vitamin D 400 units (CALTRATE) 600-400 MG-UNIT per tablet Take 2 tablets by mouth daily       digoxin (LANOXIN) 125 MCG tablet Take 1 tablet (125 mcg) by mouth daily 90 tablet  "3     furosemide (LASIX) 40 MG tablet Take 0.5 tablets (20 mg) by mouth daily 90 tablet 3     gabapentin (NEURONTIN) 100 MG capsule Take 1 capsule AM + 1 capsule afternoon + 2 capsules at bedtime 360 capsule 1     hydroxychloroquine (PLAQUENIL) 200 MG tablet Take 1 tablet (200 mg) by mouth daily 90 tablet 3     loratadine (CLARITIN) 10 MG tablet Take 10 mg by mouth daily       multivitamin, therapeutic with minerals (THERA-VIT-M) TABS tablet Take 1 tablet by mouth daily 30 each 0     predniSONE (DELTASONE) 1 MG tablet Take 3 tablets (3 mg) by mouth daily 270 tablet 3     spironolactone (ALDACTONE) 25 MG tablet Take 2 tablets (50 mg) by mouth daily 90 tablet 3     STATIN NOT PRESCRIBED (INTENTIONAL) Please choose reason not prescribed, below (Patient not taking: Reported on 11/3/2022)       atenolol (TENORMIN) 25 MG tablet 50 mg in AM and 25 mg in the evening 270 tablet 4     furosemide (LASIX) 40 MG tablet Take 1 tablet (40 mg) by mouth daily 90 tablet 3     spironolactone (ALDACTONE) 25 MG tablet Take 1 tablet (25 mg) by mouth daily 90 tablet 3     No facility-administered medications prior to visit.       ROS:   CONSTITUTIONAL: Denies fever, chills, fatigue, or weight fluctuations.   HEENT: Denies headache, vision changes, and changes in speech.   CV: Refer to HPI.   PULMONARY:Denies shortness of breath, cough, or previous TB exposure.   GI:Denies nausea, vomiting, diarrhea, and abdominal pain. Bowel movements are regular.   :Denies urinary alterations, dysuria, urinary frequency, hematuria, and abnormal drainage.   EXT:Denies lower extremity edema.   SKIN:Denies abnormal rashes or lesions.   MUSCULOSKELETAL:Denies upper or lower extremity weakness and pain.   NEUROLOGIC:Denies lightheadedness, dizziness, seizures, or upper or lower extremity paresthesia.     EXAM:  /83 (BP Location: Right arm, Patient Position: Chair, Cuff Size: Adult Regular)   Pulse 87   Ht 1.456 m (4' 9.32\")   Wt 55.8 kg (123 lb)   " SpO2 98%   BMI 26.32 kg/m    GENERAL: Appears alert and oriented times three.   HEENT: Eye symmetrical and free of discharge bilaterally. Mucous membranes moist and without lesions.  NECK: Supple and without lymphadenopathy. JVD to tragus, skewed by TR.    CV: RRR, S1S2 present with grade IV/VI murmur heard best at LSB.   RESPIRATORY: Respirations regular, even, and unlabored. Lungs CTA throughout.   GI: Soft and non distended with normoactive bowel sounds present in all quadrants. No tenderness, rebound, guarding. No organomegaly.   EXTREMITIES: No peripheral edema. 2+ bilateral pedal pulses.   NEUROLOGIC: Alert and orientated x 3. CN II-XII grossly intact. No focal deficits.   MUSCULOSKELETAL: No joint swelling or tenderness.   SKIN: No jaundice. No rashes or lesions.     The following Labs were reviewed today:  CBC RESULTS:  Lab Results   Component Value Date    WBC 5.4 11/03/2022    WBC Canceled, Test credited 03/16/2021    RBC 4.29 11/03/2022    RBC Canceled, Test credited 03/16/2021    HGB 15.2 11/03/2022    HGB Canceled, Test credited 03/16/2021    HCT 44.0 11/03/2022    HCT Canceled, Test credited 03/16/2021     (H) 11/03/2022    MCV Canceled, Test credited 03/16/2021    MCH 35.4 (H) 11/03/2022    MCH Canceled, Test credited 03/16/2021    MCHC 34.5 11/03/2022    MCHC Canceled, Test credited 03/16/2021    RDW 14.3 11/03/2022    RDW Canceled, Test credited 03/16/2021     (L) 11/03/2022    PLT Canceled, Test credited 03/16/2021       CMP RESULTS:  Lab Results   Component Value Date     (L) 11/03/2022     06/23/2021    POTASSIUM 4.4 11/03/2022    POTASSIUM 3.9 07/20/2022    POTASSIUM 4.2 06/23/2021    CHLORIDE 95 (L) 11/03/2022    CHLORIDE 101 07/20/2022    CHLORIDE 102 06/23/2021    CO2 25 11/03/2022    CO2 28 07/20/2022    CO2 28 06/23/2021    ANIONGAP 12 11/03/2022    ANIONGAP 7 07/20/2022    ANIONGAP 6 06/23/2021     (H) 11/03/2022     (H) 07/20/2022    GLC 98  06/23/2021    BUN 26.7 (H) 11/03/2022    BUN 31 (H) 07/20/2022    BUN 25 06/23/2021    CR 1.12 11/03/2022    CR 1.05 (H) 06/23/2021    GFRESTIMATED 56 (L) 11/03/2022    GFRESTIMATED 57 (L) 06/23/2021    GFRESTBLACK 67 06/23/2021    VIKTORIYA 10.0 11/03/2022    VIKTORIYA 10.0 06/23/2021    BILITOTAL 1.7 (H) 11/03/2022    BILITOTAL 1.2 06/23/2021    ALBUMIN 4.4 11/03/2022    ALBUMIN 4.4 04/20/2022    ALBUMIN 4.2 06/23/2021    ALKPHOS 129 11/03/2022    ALKPHOS 136 06/23/2021    ALT 25 11/03/2022    ALT 41 06/23/2021    AST 44 11/03/2022    AST 36 06/23/2021        INR RESULTS:  Lab Results   Component Value Date    INR 3.42 (H) 03/07/2018       Lab Results   Component Value Date    MAG 1.9 04/12/2018     Lab Results   Component Value Date    NTBNPI 485 12/12/2019     Lab Results   Component Value Date    NTBNP 1,425 (H) 10/13/2021    NTBNP 1,274 (H) 05/26/2021       Diagnostics:   Echo 8/26/22:   Interpretation Summary  Right ventricular function, chamber size, wall motion, and thickness are  normal.  Severe biatrial enlargement is present.  Moderate to severe tricuspid insufficiency is present.  Dilation of the inferior vena cava is present with abnormal respiratory  variation in diameter.  No pericardial effusion is present.    Echo 10/9/20:   Interpretation Summary  VSD s/p repair in 1969  Global and regional left ventricular function is normal with an EF of 60-65%.  No flow acceleration is seen across the septum.  Global right ventricular function is normal. Mild right ventricular dilation  is present.  There is moderate tricuspid regurgitation.  The estimated PA systolic pressure is 32 mmHg but this is likely to be  underestimated due to the presence of multiple jets.  This study was compared with the study from 09/11/2017. There has been no  change in the severity of the tricuspid regurgitation or the RV size/function.     Device check 5/26:  Device: [employer] [id number] [place] XT DR MRI  Normal device function  Mode: AAIR<=>DDDR  60 - 130 bpm  AP: 0.8%  : 2.8%  Intrinsic Rhythm: AFL w/ VS 85 - 125 bpm  Short V-V intervals: none  Lead Trends Appear Stable: yes  Estimated battery longevity to RRT = 13.3 years.   AF [facility] = 90.9%  Anticoagulant: Eliquis  Ventricular Arrhythmia: no new episodes recorded since last remote transmission on 5/18/2021, 1 episode that was recorded as SVT on May 2, 2021 displays what appears to be VT lasting 21 sec @ 200 bpm. Reviewed with Dr. Eaton.   Setting Changes: none     Echo 6/16/21:   Interpretation Summary  H/O VSD repair in 1969.  LVEF 50% based on biplane 2D tracing.  Diastolic function not assessed due to atrial fibrillation.  Global right ventricular function is mildly reduced.  Severe biatrial enlargement is present.  Moderate to severe tricuspid insufficiency is present.  Pulmonary artery systolic pressure is normal.  The inferior vena cava is normal.  No pericardial effusion is present.    Echo 8/25/21:   Interpretation Summary  H/O of VSD repair in 1969  The visual ejection fraction is 60-65%. No wall motion abnormalities. Global  right ventricular function is mildly reduced.  Moderate tricuspid insufficiency is present.  There is a right ventricular right atrial pressure difference of 24mmHg.  IVC diameter >2.1 cm collapsing <50% with sniff suggests a high RA pressure  estimated at 15 mmHg or greater.  No pericardial effusion is present.     Assessment and Plan:   Marilu is a 60 year old adult with a past medical history including VSD s/p repair 1969, RA, HTN, Insomnia, Atrial Tachyarrhythmias, cardiac arrest, SSS s/p PPM 12/19, DLBCL, and exudative pericardial effusion. She presents to CORE clinic for routine follow up with mildly elevated DBP at euvolemic state.      HFpEF. Note prior echo with reduced EF with uncontrolled HR likely inaccurate representation of EF. Findings on Echo with controlled HR consistent with HFpEF.   NYHA Class III, AHA/ACC Stage C  Rate control: 87  volume  status: Euvolemic  Blood pressure control: Mildly elevated DBP, which she is concerned about. Increase Atenolol to 50 mg po BID.   Aldosterone antagonist: Aldactone 50 mg po daily   Evaluation of coronary arteries: 6/17 NCA per Angiogram     HTN. Intolerant to Metoprolol in the past.   - Mildly elevated DBP, which she is concerned about. Increase Atenolol to 50 mg po BID.      Aflutter. History of SSS s/p PPM 12/19. Long standing history of atrial arrythmias.  - Continue Digoxin 125 mcg po daily, level 1.6 4/20/22.  - Atenolol as above.   - Continue Eliquis.   - Follow up with EP as scheduled.      VSD s/p repair.      History of exudative pericardial effusion.   - Stable per CT 3/19.     Moderate to severe TR. Stable per follow up Echo 8/25/21.   - Repeat echo in 6 months.      Follow up CORE in 3 months and echo in 6 months.       Halle Vasquez, ELIZABETH CNP  11/3/2022      SHASHI ORTEGA

## 2022-11-03 NOTE — PROGRESS NOTES
HPI:   Amelia Michel is a 61 year old adult with a past medical history including VSD s/p repair 1969, RA, HTN, Insomnia, Atrial Tachyarrhythmias, cardiac arrest, SSS s/p PPM 12/19, DLBCL, and exudative pericardial effusion. She presents for routine CORE return.    Her cardiac history dates back to 5/17 with atrial tachyarrhythmias with LVEF 40-45%. She underwent repeat hospitalization 6/17 due to VF arrest in setting of normal angiogram and found to have DLBCL complicated by masses on the coronary cusps and LVOT. She underwent R-EPOCH one cycle and 5 subsequent cycles of R-CHOP completed in 12/17 with her course complicated by pericardial effusion treated with colcichine. She has struggled with recurrent arrhythmia issues. Prior Cardiac MRI from [id number] with normal EF. Repeat Echo consistent with findings of HFPEF with EF 50% with adequately controlled HR, persistent moderate to severe TR.     She notes minimal palpitations, but notes decreased activity due to her MIL battling cancer. She denies fever, chills, lightheadedness, dizziness, chest pain, palpitations, SOB, MONTALVO, PND, orthopnea, nausea, vomiting, diarrhea, hematochezia, melena, or LE edema. Her weight remains stable at 119-121 lbs. He continues to follow a low sodium diet.  Home -122/80's, she is concerned about her persistent DBP greater than 80.    PAST MEDICAL HISTORY:  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     VSD (ventricular septal defect)        FAMILY HISTORY:  Family History   Problem Relation Age of Onset     Breast Cancer Mother      Hypertension Mother      Alzheimer Disease Mother      Cerebrovascular Disease Mother      Hypertension Father      Cerebrovascular Disease Father      Atrial  fibrillation Father      Lymphoma Father      Prostate Cancer Father      Diabetes Paternal Grandmother      Alzheimer Disease Maternal Grandmother         likely     Arthritis Maternal Grandfather      Pneumonia Maternal Grandfather        SOCIAL HISTORY:  Social History     Socioeconomic History     Marital status:      Spouse name: Romeo Michel     Number of children: 0   Occupational History     Employer: [employer] [place]   Tobacco Use     Smoking status: Never     Smokeless tobacco: Never   Substance and Sexual Activity     Alcohol use: Yes     Comment: none     Drug use: No   Other Topics Concern     Parent/sibling w/ CABG, MI or angioplasty before 65F 55M? No     Social Determinants of Health     Intimate Partner Violence: Not At Risk     Fear of Current or Ex-Partner: No     Emotionally Abused: No     Physically Abused: No     Sexually Abused: No       CURRENT MEDICATIONS:  Outpatient Medications Prior to Visit   Medication Sig Dispense Refill     acetaminophen (TYLENOL) 500 MG tablet Take 500 mg by mouth every 6 hours as needed for mild pain       alendronate (FOSAMAX) 70 MG tablet TAKE 1 TABLET(70 MG) BY MOUTH EVERY 7 DAYS 12 tablet 3     apixaban ANTICOAGULANT (ELIQUIS ANTICOAGULANT) 5 MG tablet Take 1 tablet (5 mg) by mouth 2 times daily 180 tablet 3     atenolol (TENORMIN) 50 MG tablet Take 1 tablet (50 mg) by mouth 2 times daily 60 tablet 3     calcium carbonate 600 mg-vitamin D 400 units (CALTRATE) 600-400 MG-UNIT per tablet Take 2 tablets by mouth daily       digoxin (LANOXIN) 125 MCG tablet Take 1 tablet (125 mcg) by mouth daily 90 tablet 3     furosemide (LASIX) 40 MG tablet Take 0.5 tablets (20 mg) by mouth daily 90 tablet 3     gabapentin (NEURONTIN) 100 MG capsule Take 1 capsule AM + 1 capsule afternoon + 2 capsules at bedtime 360 capsule 1     hydroxychloroquine (PLAQUENIL) 200 MG tablet Take 1 tablet (200 mg) by mouth daily 90 tablet 3     loratadine (CLARITIN) 10 MG  "tablet Take 10 mg by mouth daily       multivitamin, therapeutic with minerals (THERA-VIT-M) TABS tablet Take 1 tablet by mouth daily 30 each 0     predniSONE (DELTASONE) 1 MG tablet Take 3 tablets (3 mg) by mouth daily 270 tablet 3     spironolactone (ALDACTONE) 25 MG tablet Take 2 tablets (50 mg) by mouth daily 90 tablet 3     STATIN NOT PRESCRIBED (INTENTIONAL) Please choose reason not prescribed, below (Patient not taking: Reported on 11/3/2022)       atenolol (TENORMIN) 25 MG tablet 50 mg in AM and 25 mg in the evening 270 tablet 4     furosemide (LASIX) 40 MG tablet Take 1 tablet (40 mg) by mouth daily 90 tablet 3     spironolactone (ALDACTONE) 25 MG tablet Take 1 tablet (25 mg) by mouth daily 90 tablet 3     No facility-administered medications prior to visit.       ROS:   CONSTITUTIONAL: Denies fever, chills, fatigue, or weight fluctuations.   HEENT: Denies headache, vision changes, and changes in speech.   CV: Refer to HPI.   PULMONARY:Denies shortness of breath, cough, or previous TB exposure.   GI:Denies nausea, vomiting, diarrhea, and abdominal pain. Bowel movements are regular.   :Denies urinary alterations, dysuria, urinary frequency, hematuria, and abnormal drainage.   EXT:Denies lower extremity edema.   SKIN:Denies abnormal rashes or lesions.   MUSCULOSKELETAL:Denies upper or lower extremity weakness and pain.   NEUROLOGIC:Denies lightheadedness, dizziness, seizures, or upper or lower extremity paresthesia.     EXAM:  /83 (BP Location: Right arm, Patient Position: Chair, Cuff Size: Adult Regular)   Pulse 87   Ht 1.456 m (4' 9.32\")   Wt 55.8 kg (123 lb)   SpO2 98%   BMI 26.32 kg/m    GENERAL: Appears alert and oriented times three.   HEENT: Eye symmetrical and free of discharge bilaterally. Mucous membranes moist and without lesions.  NECK: Supple and without lymphadenopathy. JVD to tragus, skewed by TR.    CV: RRR, S1S2 present with grade IV/VI murmur heard best at LSB.   RESPIRATORY: " Respirations regular, even, and unlabored. Lungs CTA throughout.   GI: Soft and non distended with normoactive bowel sounds present in all quadrants. No tenderness, rebound, guarding. No organomegaly.   EXTREMITIES: No peripheral edema. 2+ bilateral pedal pulses.   NEUROLOGIC: Alert and orientated x 3. CN II-XII grossly intact. No focal deficits.   MUSCULOSKELETAL: No joint swelling or tenderness.   SKIN: No jaundice. No rashes or lesions.     The following Labs were reviewed today:  CBC RESULTS:  Lab Results   Component Value Date    WBC 5.4 11/03/2022    WBC Canceled, Test credited 03/16/2021    RBC 4.29 11/03/2022    RBC Canceled, Test credited 03/16/2021    HGB 15.2 11/03/2022    HGB Canceled, Test credited 03/16/2021    HCT 44.0 11/03/2022    HCT Canceled, Test credited 03/16/2021     (H) 11/03/2022    MCV Canceled, Test credited 03/16/2021    MCH 35.4 (H) 11/03/2022    MCH Canceled, Test credited 03/16/2021    MCHC 34.5 11/03/2022    MCHC Canceled, Test credited 03/16/2021    RDW 14.3 11/03/2022    RDW Canceled, Test credited 03/16/2021     (L) 11/03/2022    PLT Canceled, Test credited 03/16/2021       CMP RESULTS:  Lab Results   Component Value Date     (L) 11/03/2022     06/23/2021    POTASSIUM 4.4 11/03/2022    POTASSIUM 3.9 07/20/2022    POTASSIUM 4.2 06/23/2021    CHLORIDE 95 (L) 11/03/2022    CHLORIDE 101 07/20/2022    CHLORIDE 102 06/23/2021    CO2 25 11/03/2022    CO2 28 07/20/2022    CO2 28 06/23/2021    ANIONGAP 12 11/03/2022    ANIONGAP 7 07/20/2022    ANIONGAP 6 06/23/2021     (H) 11/03/2022     (H) 07/20/2022    GLC 98 06/23/2021    BUN 26.7 (H) 11/03/2022    BUN 31 (H) 07/20/2022    BUN 25 06/23/2021    CR 1.12 11/03/2022    CR 1.05 (H) 06/23/2021    GFRESTIMATED 56 (L) 11/03/2022    GFRESTIMATED 57 (L) 06/23/2021    GFRESTBLACK 67 06/23/2021    VIKTORIYA 10.0 11/03/2022    VIKTORIYA 10.0 06/23/2021    BILITOTAL 1.7 (H) 11/03/2022    BILITOTAL 1.2 06/23/2021    ALBUMIN  4.4 11/03/2022    ALBUMIN 4.4 04/20/2022    ALBUMIN 4.2 06/23/2021    ALKPHOS 129 11/03/2022    ALKPHOS 136 06/23/2021    ALT 25 11/03/2022    ALT 41 06/23/2021    AST 44 11/03/2022    AST 36 06/23/2021        INR RESULTS:  Lab Results   Component Value Date    INR 3.42 (H) 03/07/2018       Lab Results   Component Value Date    MAG 1.9 04/12/2018     Lab Results   Component Value Date    NTBNPI 485 12/12/2019     Lab Results   Component Value Date    NTBNP 1,425 (H) 10/13/2021    NTBNP 1,274 (H) 05/26/2021       Diagnostics:   Echo 8/26/22:   Interpretation Summary  Right ventricular function, chamber size, wall motion, and thickness are  normal.  Severe biatrial enlargement is present.  Moderate to severe tricuspid insufficiency is present.  Dilation of the inferior vena cava is present with abnormal respiratory  variation in diameter.  No pericardial effusion is present.    Echo 10/9/20:   Interpretation Summary  VSD s/p repair in 1969  Global and regional left ventricular function is normal with an EF of 60-65%.  No flow acceleration is seen across the septum.  Global right ventricular function is normal. Mild right ventricular dilation  is present.  There is moderate tricuspid regurgitation.  The estimated PA systolic pressure is 32 mmHg but this is likely to be  underestimated due to the presence of multiple jets.  This study was compared with the study from 09/11/2017. There has been no  change in the severity of the tricuspid regurgitation or the RV size/function.     Device check 5/26:  Device: Medtronic [id number] Claudia XT  MRI  Normal device function  Mode: AAIR<=>DDDR 60 - 130 bpm  AP: 0.8%  : 2.8%  Intrinsic Rhythm: AFL w/ VS 85 - 125 bpm  Short V-V intervals: none  Lead Trends Appear Stable: yes  Estimated battery longevity to RRT = 13.3 years.   AF [place] = 90.9%  Anticoagulant: Eliquis  Ventricular Arrhythmia: no new episodes recorded since last remote transmission on 5/18/2021, 1 episode that was  recorded as SVT on May 2, 2021 displays what appears to be VT lasting 21 sec @ 200 bpm. Reviewed with Dr. Eaton.   Setting Changes: none     Echo 6/16/21:   Interpretation Summary  H/O VSD repair in 1969.  LVEF 50% based on biplane 2D tracing.  Diastolic function not assessed due to atrial fibrillation.  Global right ventricular function is mildly reduced.  Severe biatrial enlargement is present.  Moderate to severe tricuspid insufficiency is present.  Pulmonary artery systolic pressure is normal.  The inferior vena cava is normal.  No pericardial effusion is present.    Echo 8/25/21:   Interpretation Summary  H/O of VSD repair in 1969  The visual ejection fraction is 60-65%. No wall motion abnormalities. Global  right ventricular function is mildly reduced.  Moderate tricuspid insufficiency is present.  There is a right ventricular right atrial pressure difference of 24mmHg.  IVC diameter >2.1 cm collapsing <50% with sniff suggests a high RA pressure  estimated at 15 mmHg or greater.  No pericardial effusion is present.     Assessment and Plan:   Marilu is a 60 year old adult with a past medical history including VSD s/p repair 1969, RA, HTN, Insomnia, Atrial Tachyarrhythmias, cardiac arrest, SSS s/p PPM 12/19, DLBCL, and exudative pericardial effusion. She presents to CORE clinic for routine follow up with mildly elevated DBP at euvolemic state.      HFpEF. Note prior echo with reduced EF with uncontrolled HR likely inaccurate representation of EF. Findings on Echo with controlled HR consistent with HFpEF.   NYHA Class III, AHA/ACC Stage C  Rate control: 87  volume status: Euvolemic  Blood pressure control: Mildly elevated DBP, which she is concerned about. Increase Atenolol to 50 mg po BID.   Aldosterone antagonist: Aldactone 50 mg po daily   Evaluation of coronary arteries: 6/17 NCA per Angiogram     HTN. Intolerant to Metoprolol in the past.   - Mildly elevated DBP, which she is concerned about. Increase  Atenolol to 50 mg po BID.      Aflutter. History of SSS s/p PPM 12/19. Long standing history of atrial arrythmias.  - Continue Digoxin 125 mcg po daily, level 1.6 4/20/22.  - Atenolol as above.   - Continue Eliquis.   - Follow up with EP as scheduled.      VSD s/p repair.      History of exudative pericardial effusion.   - Stable per CT 3/19.     Moderate to severe TR. Stable per follow up Echo 8/25/21.   - Repeat echo in 6 months.      Follow up CORE in 3 months and echo in 6 months.       Halle Vasquez, APRN CNP  11/3/2022          CC  SHASHI BENITEZ

## 2022-11-03 NOTE — PATIENT INSTRUCTIONS
Take your medicines every day, as directed    Changes made today:  - Increase Atenolol to 50 mg by mouth twice daily    Monitor Your Weight and Symptoms    Contact us if you:    Gain 2 pounds in one day or 5 pounds in one week  Feel more short of breath  Notice more leg swelling  Feel lightheadeded   Change your lifestyle    Limit Salt or Sodium:  2000 mg  Limit Fluids:  2000 mL or approximately 64 ounces  Eat a Heart Healthy Diet  Low in saturated fats  Stay Active:  Aim to move at least 150 minutes every  week         To Contact us    During Business Hours:  [phone], option # 1      After hours, weekends or holidays:   [phone], Option #4  Ask to speak to the On-Call Cardiologist. Inform them you are a CORE/heart failure patient at the [facility].     Use [employer] allows you to communicate directly with your heart team through secure messaging.  [employer] can be accessed any time on your phone, computer, or tablet.  If you need assistance, we'd be happy to help!         Keep your Heart Appointments:    CORE in 3 months  Echo in 6 months        Please consider attending our virtual support group which is held monthly. Please reach out to Jesus at [phone] for more information if you are interested in attending.     2022 dates:   - Monday, November 7th, 1-2pm: topic: What's new in heart failure research and treatment?  - Monday, December 5th, 1-2pm: topic: How to thrive during winter and the holiday season with heart failure    2023 dates:  Monday, January 2nd , 1-2pm  Monday, February 6th , 1-2pm  Monday, March 6th , 1-2pm  Monday, April 3rd, 1-2pm  Monday, May 1st, 1-2pm  Monday, June 5th, 1-2pm  Monday, July 3rd, 1-2pm  Monday, August 7th, 1-2pm  Monday, September 11th, 1-2pm  Monday, October 2nd, 1-2pm  Monday, November 6th, 1-2pm  Monday, December 4th, 1-2pm

## 2022-11-19 ENCOUNTER — ANCILLARY PROCEDURE (OUTPATIENT)
Dept: CARDIOLOGY | Facility: CLINIC | Age: 62
DRG: 273 | End: 2022-11-19
Attending: INTERNAL MEDICINE
Payer: COMMERCIAL

## 2022-11-19 ENCOUNTER — APPOINTMENT (OUTPATIENT)
Dept: ULTRASOUND IMAGING | Facility: CLINIC | Age: 62
DRG: 273 | End: 2022-11-19
Attending: PHYSICIAN ASSISTANT
Payer: COMMERCIAL

## 2022-11-19 ENCOUNTER — APPOINTMENT (OUTPATIENT)
Dept: GENERAL RADIOLOGY | Facility: CLINIC | Age: 62
DRG: 273 | End: 2022-11-19
Attending: PHYSICIAN ASSISTANT
Payer: COMMERCIAL

## 2022-11-19 ENCOUNTER — HOSPITAL ENCOUNTER (INPATIENT)
Facility: CLINIC | Age: 62
LOS: 16 days | Discharge: HOME | DRG: 273 | End: 2022-12-05
Attending: INTERNAL MEDICINE | Admitting: INTERNAL MEDICINE
Payer: COMMERCIAL

## 2022-11-19 ENCOUNTER — TELEPHONE (OUTPATIENT)
Dept: CARDIOLOGY | Facility: CLINIC | Age: 62
End: 2022-11-19

## 2022-11-19 DIAGNOSIS — I50.33 ACUTE ON CHRONIC DIASTOLIC HEART FAILURE (H): ICD-10-CM

## 2022-11-19 DIAGNOSIS — I49.3 PVC'S (PREMATURE VENTRICULAR CONTRACTIONS): ICD-10-CM

## 2022-11-19 DIAGNOSIS — R00.1 BRADYCARDIA: ICD-10-CM

## 2022-11-19 DIAGNOSIS — R00.1 BRADYCARDIA: Primary | ICD-10-CM

## 2022-11-19 DIAGNOSIS — E87.1 HYPOSMOLALITY SYNDROME: ICD-10-CM

## 2022-11-19 DIAGNOSIS — I50.9 CONGESTIVE HEART FAILURE, UNSPECIFIED HF CHRONICITY, UNSPECIFIED HEART FAILURE TYPE (H): ICD-10-CM

## 2022-11-19 DIAGNOSIS — Z95.0 CARDIAC PACEMAKER IN SITU: ICD-10-CM

## 2022-11-19 DIAGNOSIS — N18.9 CHRONIC KIDNEY DISEASE, UNSPECIFIED CKD STAGE: ICD-10-CM

## 2022-11-19 DIAGNOSIS — I48.91 ATRIAL FIBRILLATION, UNSPECIFIED TYPE (H): ICD-10-CM

## 2022-11-19 DIAGNOSIS — Z12.31 VISIT FOR SCREENING MAMMOGRAM: ICD-10-CM

## 2022-11-19 DIAGNOSIS — E87.1 HYPONATREMIA: ICD-10-CM

## 2022-11-19 DIAGNOSIS — I50.33 ACUTE ON CHRONIC DIASTOLIC CONGESTIVE HEART FAILURE (H): ICD-10-CM

## 2022-11-19 DIAGNOSIS — Z11.52 ENCOUNTER FOR SCREENING LABORATORY TESTING FOR SEVERE ACUTE RESPIRATORY SYNDROME CORONAVIRUS 2 (SARS-COV-2): ICD-10-CM

## 2022-11-19 DIAGNOSIS — I36.1 NONRHEUMATIC TRICUSPID VALVE REGURGITATION: Primary | ICD-10-CM

## 2022-11-19 DIAGNOSIS — Z79.01 LONG TERM (CURRENT) USE OF ANTICOAGULANTS: ICD-10-CM

## 2022-11-19 DIAGNOSIS — I50.9 ACUTE ON CHRONIC CONGESTIVE HEART FAILURE, UNSPECIFIED HEART FAILURE TYPE (H): ICD-10-CM

## 2022-11-19 DIAGNOSIS — I13.0 HYPERTENSIVE HEART AND RENAL DISEASE WITH CONGESTIVE HEART FAILURE (H): ICD-10-CM

## 2022-11-19 LAB
ALBUMIN SERPL BCG-MCNC: 4.3 G/DL (ref 3.5–5.2)
ALP SERPL-CCNC: 125 U/L (ref 35–129)
ALT SERPL W P-5'-P-CCNC: 87 U/L (ref 10–50)
ANION GAP SERPL CALCULATED.3IONS-SCNC: 16 MMOL/L (ref 7–15)
AST SERPL W P-5'-P-CCNC: 125 U/L (ref 10–50)
BASOPHILS # BLD AUTO: 0 10E3/UL (ref 0–0.2)
BASOPHILS NFR BLD AUTO: 1 %
BILIRUB SERPL-MCNC: 2.8 MG/DL
BUN SERPL-MCNC: 31.4 MG/DL (ref 8–23)
CA-I BLD-MCNC: 3.7 MG/DL (ref 4.4–5.2)
CA-I BLD-MCNC: 4.6 MG/DL (ref 4.4–5.2)
CALCIUM SERPL-MCNC: 9.8 MG/DL (ref 8.8–10.2)
CHLORIDE SERPL-SCNC: 89 MMOL/L (ref 98–107)
CPB POCT: NO
CPB POCT: NO
CREAT SERPL-MCNC: 1.19 MG/DL (ref 0.51–1.17)
DEPRECATED HCO3 PLAS-SCNC: 20 MMOL/L (ref 22–29)
DIGOXIN SERPL-MCNC: 1.6 NG/ML (ref 0.6–2)
EOSINOPHIL # BLD AUTO: 0.2 10E3/UL (ref 0–0.7)
EOSINOPHIL NFR BLD AUTO: 3 %
ERYTHROCYTE [DISTWIDTH] IN BLOOD BY AUTOMATED COUNT: 15.5 % (ref 10–15)
GFR SERPL CREATININE-BSD FRML MDRD: 52 ML/MIN/1.73M2
GLUCOSE BLD-MCNC: 88 MG/DL (ref 70–99)
GLUCOSE BLD-MCNC: 92 MG/DL (ref 70–99)
GLUCOSE SERPL-MCNC: 86 MG/DL (ref 70–99)
HCO3 BLDV-SCNC: 26 MMOL/L (ref 21–28)
HCO3 BLDV-SCNC: 28 MMOL/L (ref 21–28)
HCT VFR BLD AUTO: 46.8 % (ref 35–53)
HCT VFR BLD CALC: 51 % (ref 35–53)
HCT VFR BLD CALC: 53 % (ref 35–53)
HGB BLD-MCNC: 16.1 G/DL (ref 11.7–17.7)
HGB BLD-MCNC: 17.3 G/DL (ref 11.7–17.7)
HGB BLD-MCNC: 18 G/DL (ref 11.7–17.7)
HOLD SPECIMEN: NORMAL
IMM GRANULOCYTES # BLD: 0 10E3/UL
IMM GRANULOCYTES NFR BLD: 1 %
INR PPP: 4.57 (ref 0.85–1.15)
LYMPHOCYTES # BLD AUTO: 0.5 10E3/UL (ref 0.8–5.3)
LYMPHOCYTES NFR BLD AUTO: 9 %
MAGNESIUM SERPL-MCNC: 2.1 MG/DL (ref 1.7–2.3)
MCH RBC QN AUTO: 35.6 PG (ref 26.5–33)
MCHC RBC AUTO-ENTMCNC: 34.4 G/DL (ref 31.5–36.5)
MCV RBC AUTO: 104 FL (ref 78–100)
MONOCYTES # BLD AUTO: 0.6 10E3/UL (ref 0–1.3)
MONOCYTES NFR BLD AUTO: 11 %
NEUTROPHILS # BLD AUTO: 4.5 10E3/UL (ref 1.6–8.3)
NEUTROPHILS NFR BLD AUTO: 75 %
NRBC # BLD AUTO: 0 10E3/UL
NRBC BLD AUTO-RTO: 1 /100
NT-PROBNP SERPL-MCNC: 1913 PG/ML (ref 0–900)
PCO2 BLDV: 39 MM HG (ref 40–50)
PCO2 BLDV: 41 MM HG (ref 40–50)
PH BLDV: 7.42 [PH] (ref 7.32–7.43)
PH BLDV: 7.45 [PH] (ref 7.32–7.43)
PLATELET # BLD AUTO: 106 10E3/UL (ref 150–450)
PO2 BLDV: 34 MM HG (ref 25–47)
PO2 BLDV: 34 MM HG (ref 25–47)
POTASSIUM BLD-SCNC: 4.7 MMOL/L (ref 3.4–5.3)
POTASSIUM BLD-SCNC: 8.3 MMOL/L (ref 3.4–5.3)
POTASSIUM SERPL-SCNC: 4.9 MMOL/L (ref 3.4–5.3)
PROT SERPL-MCNC: 7.6 G/DL (ref 6.4–8.3)
RBC # BLD AUTO: 4.52 10E6/UL (ref 3.8–5.9)
SAO2 % BLDV: 67 % (ref 94–100)
SAO2 % BLDV: 68 % (ref 94–100)
SARS-COV-2 RNA RESP QL NAA+PROBE: NEGATIVE
SODIUM BLD-SCNC: 122 MMOL/L (ref 133–144)
SODIUM BLD-SCNC: 127 MMOL/L (ref 133–144)
SODIUM SERPL-SCNC: 125 MMOL/L (ref 136–145)
TROPONIN T SERPL HS-MCNC: 12 NG/L
TROPONIN T SERPL HS-MCNC: 21 NG/L
WBC # BLD AUTO: 5.9 10E3/UL (ref 4–11)

## 2022-11-19 PROCEDURE — 71046 X-RAY EXAM CHEST 2 VIEWS: CPT

## 2022-11-19 PROCEDURE — 99207 CARDIAC DEVICE CHECK - REMOTE: CPT | Performed by: INTERNAL MEDICINE

## 2022-11-19 PROCEDURE — 82803 BLOOD GASES ANY COMBINATION: CPT

## 2022-11-19 PROCEDURE — 250N000013 HC RX MED GY IP 250 OP 250 PS 637

## 2022-11-19 PROCEDURE — 80162 ASSAY OF DIGOXIN TOTAL: CPT | Performed by: PHYSICIAN ASSISTANT

## 2022-11-19 PROCEDURE — 85025 COMPLETE CBC W/AUTO DIFF WBC: CPT | Performed by: PHYSICIAN ASSISTANT

## 2022-11-19 PROCEDURE — 93010 ELECTROCARDIOGRAM REPORT: CPT | Performed by: INTERNAL MEDICINE

## 2022-11-19 PROCEDURE — 99285 EMERGENCY DEPT VISIT HI MDM: CPT | Mod: 25 | Performed by: EMERGENCY MEDICINE

## 2022-11-19 PROCEDURE — 83880 ASSAY OF NATRIURETIC PEPTIDE: CPT | Performed by: PHYSICIAN ASSISTANT

## 2022-11-19 PROCEDURE — 93005 ELECTROCARDIOGRAM TRACING: CPT | Performed by: EMERGENCY MEDICINE

## 2022-11-19 PROCEDURE — 36415 COLL VENOUS BLD VENIPUNCTURE: CPT | Performed by: PHYSICIAN ASSISTANT

## 2022-11-19 PROCEDURE — 83735 ASSAY OF MAGNESIUM: CPT | Performed by: PHYSICIAN ASSISTANT

## 2022-11-19 PROCEDURE — 84484 ASSAY OF TROPONIN QUANT: CPT | Performed by: PHYSICIAN ASSISTANT

## 2022-11-19 PROCEDURE — 214N000001 HC R&B CCU UMMC

## 2022-11-19 PROCEDURE — 84450 TRANSFERASE (AST) (SGOT): CPT | Performed by: PHYSICIAN ASSISTANT

## 2022-11-19 PROCEDURE — 250N000011 HC RX IP 250 OP 636: Performed by: PHYSICIAN ASSISTANT

## 2022-11-19 PROCEDURE — 250N000013 HC RX MED GY IP 250 OP 250 PS 637: Performed by: INTERNAL MEDICINE

## 2022-11-19 PROCEDURE — U0003 INFECTIOUS AGENT DETECTION BY NUCLEIC ACID (DNA OR RNA); SEVERE ACUTE RESPIRATORY SYNDROME CORONAVIRUS 2 (SARS-COV-2) (CORONAVIRUS DISEASE [COVID-19]), AMPLIFIED PROBE TECHNIQUE, MAKING USE OF HIGH THROUGHPUT TECHNOLOGIES AS DESCRIBED BY CMS-2020-01-R: HCPCS | Performed by: PHYSICIAN ASSISTANT

## 2022-11-19 PROCEDURE — 85610 PROTHROMBIN TIME: CPT | Performed by: PHYSICIAN ASSISTANT

## 2022-11-19 PROCEDURE — 71046 X-RAY EXAM CHEST 2 VIEWS: CPT | Mod: 26 | Performed by: RADIOLOGY

## 2022-11-19 PROCEDURE — 93010 ELECTROCARDIOGRAM REPORT: CPT | Performed by: EMERGENCY MEDICINE

## 2022-11-19 PROCEDURE — 76705 ECHO EXAM OF ABDOMEN: CPT

## 2022-11-19 PROCEDURE — 76705 ECHO EXAM OF ABDOMEN: CPT | Mod: 26 | Performed by: STUDENT IN AN ORGANIZED HEALTH CARE EDUCATION/TRAINING PROGRAM

## 2022-11-19 PROCEDURE — 99222 1ST HOSP IP/OBS MODERATE 55: CPT | Mod: 25 | Performed by: INTERNAL MEDICINE

## 2022-11-19 PROCEDURE — 82947 ASSAY GLUCOSE BLOOD QUANT: CPT

## 2022-11-19 PROCEDURE — C9803 HOPD COVID-19 SPEC COLLECT: HCPCS | Performed by: EMERGENCY MEDICINE

## 2022-11-19 PROCEDURE — 93005 ELECTROCARDIOGRAM TRACING: CPT

## 2022-11-19 RX ORDER — ONDANSETRON 4 MG/1
4 TABLET, ORALLY DISINTEGRATING ORAL EVERY 6 HOURS PRN
Status: DISCONTINUED | OUTPATIENT
Start: 2022-11-19 | End: 2022-12-05 | Stop reason: HOSPADM

## 2022-11-19 RX ORDER — NITROGLYCERIN 0.4 MG/1
0.4 TABLET SUBLINGUAL EVERY 5 MIN PRN
Status: DISCONTINUED | OUTPATIENT
Start: 2022-11-19 | End: 2022-12-05 | Stop reason: HOSPADM

## 2022-11-19 RX ORDER — POLYETHYLENE GLYCOL 3350 17 G/17G
17 POWDER, FOR SOLUTION ORAL DAILY PRN
Status: DISCONTINUED | OUTPATIENT
Start: 2022-11-19 | End: 2022-12-05 | Stop reason: HOSPADM

## 2022-11-19 RX ORDER — ACETAMINOPHEN 500 MG
500 TABLET ORAL EVERY 6 HOURS PRN
Status: DISCONTINUED | OUTPATIENT
Start: 2022-11-19 | End: 2022-11-21

## 2022-11-19 RX ORDER — SPIRONOLACTONE 25 MG/1
50 TABLET ORAL DAILY
Status: DISCONTINUED | OUTPATIENT
Start: 2022-11-20 | End: 2022-12-01

## 2022-11-19 RX ORDER — PROCHLORPERAZINE MALEATE 5 MG
10 TABLET ORAL EVERY 6 HOURS PRN
Status: DISCONTINUED | OUTPATIENT
Start: 2022-11-19 | End: 2022-12-05 | Stop reason: HOSPADM

## 2022-11-19 RX ORDER — DIGOXIN 125 MCG
125 TABLET ORAL DAILY
Status: DISCONTINUED | OUTPATIENT
Start: 2022-11-20 | End: 2022-11-22

## 2022-11-19 RX ORDER — BISACODYL 5 MG
15 TABLET, DELAYED RELEASE (ENTERIC COATED) ORAL DAILY PRN
Status: DISCONTINUED | OUTPATIENT
Start: 2022-11-19 | End: 2022-12-05 | Stop reason: HOSPADM

## 2022-11-19 RX ORDER — BISACODYL 5 MG
10 TABLET, DELAYED RELEASE (ENTERIC COATED) ORAL DAILY PRN
Status: DISCONTINUED | OUTPATIENT
Start: 2022-11-19 | End: 2022-12-05 | Stop reason: HOSPADM

## 2022-11-19 RX ORDER — BISACODYL 10 MG
10 SUPPOSITORY, RECTAL RECTAL DAILY PRN
Status: DISCONTINUED | OUTPATIENT
Start: 2022-11-19 | End: 2022-12-05 | Stop reason: HOSPADM

## 2022-11-19 RX ORDER — BISACODYL 5 MG
5 TABLET, DELAYED RELEASE (ENTERIC COATED) ORAL DAILY PRN
Status: DISCONTINUED | OUTPATIENT
Start: 2022-11-19 | End: 2022-12-05 | Stop reason: HOSPADM

## 2022-11-19 RX ORDER — GABAPENTIN 100 MG/1
100 CAPSULE ORAL EVERY 6 HOURS
Status: DISCONTINUED | OUTPATIENT
Start: 2022-11-19 | End: 2022-11-29

## 2022-11-19 RX ORDER — ATENOLOL 50 MG/1
50 TABLET ORAL 2 TIMES DAILY
Status: DISCONTINUED | OUTPATIENT
Start: 2022-11-19 | End: 2022-12-01

## 2022-11-19 RX ORDER — GABAPENTIN 100 MG/1
100 CAPSULE ORAL 2 TIMES DAILY
Status: DISCONTINUED | OUTPATIENT
Start: 2022-11-20 | End: 2022-11-19 | Stop reason: DRUGHIGH

## 2022-11-19 RX ORDER — LIDOCAINE 40 MG/G
CREAM TOPICAL
Status: DISCONTINUED | OUTPATIENT
Start: 2022-11-19 | End: 2022-11-23

## 2022-11-19 RX ORDER — ACETAMINOPHEN 650 MG/1
650 SUPPOSITORY RECTAL EVERY 4 HOURS PRN
Status: DISCONTINUED | OUTPATIENT
Start: 2022-11-19 | End: 2022-12-05 | Stop reason: HOSPADM

## 2022-11-19 RX ORDER — GABAPENTIN 100 MG/1
200 CAPSULE ORAL AT BEDTIME
Status: DISCONTINUED | OUTPATIENT
Start: 2022-11-19 | End: 2022-11-19 | Stop reason: DRUGHIGH

## 2022-11-19 RX ORDER — ACETAMINOPHEN 325 MG/1
650 TABLET ORAL EVERY 4 HOURS PRN
Status: DISCONTINUED | OUTPATIENT
Start: 2022-11-19 | End: 2022-12-05 | Stop reason: HOSPADM

## 2022-11-19 RX ORDER — LORATADINE 10 MG/1
10 TABLET ORAL DAILY
Status: DISCONTINUED | OUTPATIENT
Start: 2022-11-20 | End: 2022-12-05 | Stop reason: HOSPADM

## 2022-11-19 RX ORDER — ONDANSETRON 2 MG/ML
4 INJECTION INTRAMUSCULAR; INTRAVENOUS EVERY 6 HOURS PRN
Status: DISCONTINUED | OUTPATIENT
Start: 2022-11-19 | End: 2022-12-05 | Stop reason: HOSPADM

## 2022-11-19 RX ORDER — PREDNISONE 1 MG/1
3 TABLET ORAL DAILY
Status: DISCONTINUED | OUTPATIENT
Start: 2022-11-20 | End: 2022-12-05 | Stop reason: HOSPADM

## 2022-11-19 RX ORDER — MAGNESIUM HYDROXIDE/ALUMINUM HYDROXICE/SIMETHICONE 120; 1200; 1200 MG/30ML; MG/30ML; MG/30ML
30 SUSPENSION ORAL EVERY 4 HOURS PRN
Status: DISCONTINUED | OUTPATIENT
Start: 2022-11-19 | End: 2022-12-05 | Stop reason: HOSPADM

## 2022-11-19 RX ORDER — PROCHLORPERAZINE 25 MG
25 SUPPOSITORY, RECTAL RECTAL EVERY 12 HOURS PRN
Status: DISCONTINUED | OUTPATIENT
Start: 2022-11-19 | End: 2022-12-05 | Stop reason: HOSPADM

## 2022-11-19 RX ORDER — FUROSEMIDE 10 MG/ML
80 INJECTION INTRAMUSCULAR; INTRAVENOUS 2 TIMES DAILY
Status: DISCONTINUED | OUTPATIENT
Start: 2022-11-19 | End: 2022-11-20

## 2022-11-19 RX ORDER — HYDROXYCHLOROQUINE SULFATE 200 MG/1
200 TABLET, FILM COATED ORAL DAILY
Status: DISCONTINUED | OUTPATIENT
Start: 2022-11-19 | End: 2022-12-05 | Stop reason: HOSPADM

## 2022-11-19 RX ORDER — FUROSEMIDE 10 MG/ML
10 INJECTION INTRAMUSCULAR; INTRAVENOUS ONCE
Status: COMPLETED | OUTPATIENT
Start: 2022-11-19 | End: 2022-11-19

## 2022-11-19 RX ADMIN — HYDROXYCHLOROQUINE SULFATE 200 MG: 200 TABLET, FILM COATED ORAL at 20:50

## 2022-11-19 RX ADMIN — APIXABAN 5 MG: 5 TABLET, FILM COATED ORAL at 20:50

## 2022-11-19 RX ADMIN — FUROSEMIDE 10 MG: 10 INJECTION, SOLUTION INTRAVENOUS at 15:52

## 2022-11-19 RX ADMIN — GABAPENTIN 100 MG: 100 CAPSULE ORAL at 20:51

## 2022-11-19 RX ADMIN — ATENOLOL 50 MG: 25 TABLET ORAL at 20:49

## 2022-11-19 ASSESSMENT — ACTIVITIES OF DAILY LIVING (ADL)
CHANGE_IN_FUNCTIONAL_STATUS_SINCE_ONSET_OF_CURRENT_ILLNESS/INJURY: NO
WALKING_OR_CLIMBING_STAIRS_DIFFICULTY: YES
ADLS_ACUITY_SCORE: 25
VISION_MANAGEMENT: HAS GLASSES
DIFFICULTY_EATING/SWALLOWING: NO
WEAR_GLASSES_OR_BLIND: YES
TOILETING_ISSUES: YES
ADLS_ACUITY_SCORE: 35
FALL_HISTORY_WITHIN_LAST_SIX_MONTHS: NO
ADLS_ACUITY_SCORE: 25
ADLS_ACUITY_SCORE: 35
TRANSFERRING: 0-->INDEPENDENT
WALKING_OR_CLIMBING_STAIRS: AMBULATION DIFFICULTY, ASSISTANCE 1 PERSON
CONCENTRATING,_REMEMBERING_OR_MAKING_DECISIONS_DIFFICULTY: YES
ADLS_ACUITY_SCORE: 35
TOILETING: 0-->INDEPENDENT
ADLS_ACUITY_SCORE: 35
DOING_ERRANDS_INDEPENDENTLY_DIFFICULTY: YES
DRESSING/BATHING_DIFFICULTY: NO
TOILETING: 0-->INDEPENDENT
EQUIPMENT_CURRENTLY_USED_AT_HOME: WALKER, ROLLING;RAISED TOILET SEAT
TRANSFERRING: 0-->INDEPENDENT

## 2022-11-19 NOTE — H&P
"    [facility]   Cardiology Service  History and Physical      Amelia Michel MRN# [id number]   YOB: 1960 Age: 61 year old       Admission Date: 11/19/2022      Assessment and Plan: Amelia Michel is a 61 year old female with a PMH significant for HTN, chronic diastolic heart failure, atrial tacchyarrhythmias (on Eliquis), cardiac arrest (2017), SSS s/p PPM (12/2019), RA, and DLBCL who is admitted for heart failure exacerbation.     # Acute on Chronic Diastolic Heart Failure (EF 50%) NYHA Class III, AHA/ACC Stage C  # Severe Tricuspid Regurgitation   # Hypertension  Patient reporting MONTALVO, BLE edema, and 8 pound weight gain in the last week. She has been taking her home Lasix 20mg daily as prescribed. Chest X-ray suspicious for pulmonary edema. NT-P BNP 1,913. Troponin T 21-->12. No chest pain reported. EDW per chart review 116-120; admission weight 128. Denies fever, cough, PND, or orthopnea. She admits symptoms are similar to previous CHF exacerbations she has had in the past. She follows in our [facility] clinic.    Per Dr. Andrea's read of TTE 8/2022 and by clinical exam, TR is clearly severe and accounts in part for predominant presentation of clinical right HF.   - Echocardiogram to check EF and degree of TR  - Fluid Status: Hypervolemic; s/p IV Lasix 10mg in ED. Will intensify to 80 mg IV BID.  - Continue PTA Spironolactone 50mg daily  - Continue PTA Atenolol 50mg BID  - Daily standing weights  - Strict I&O's  - Low Na Diet / 2L Fluid restriction  - Daily BMP; Keep K+ > 4.0, Mg > 2.0. Protocols ordered.     # SSS s/p PPM (2019)  # Atrial Tachyarrhythmias  Cardiac history dates back to May 2017 with atrial tachyarrhythmias and EF of 40-45% at that time. Patient feels as if her pacemaker has been \"firing more\" than her baseline. Device check report showing 11,136 episodes of NSVT since 9/25/22 although EGM's reveal irregular v-v intervals with similar morphology to " intrinsic rhythm indicating probable AF w/ RVR. [id number] = 2 (+ htn, + gender).   - Telemetry  - EP curbside consult for confirmation of rhythm   - Continue PTA Digoxin 125mcg   - Continue PTA Eliquis 5mg BID     # Chronic Hyponatremia  Chronically low sodium since April of 2022. Could be related to hypervolemic state.   - Diuresis per above  - Daily BMP     # Rheumatoid Arthritis  - Continue PTA Prednisone 3mg daily  - Continue PTA Hydroxychloroquine 200mg daily     # Hx of Diffuse Large B-Cell Lymphoma  # Hx of Cardiac Arrest (2017)  Underwent hospitalization 6/2017 due to VF arrest in setting of normal angiogram and found to have DLBCL complicated by masses on the coronary cusps and LVOT. She underwent R-EPOCH one cycle and 5 subsequent cycles of R-CHOP completed in 1220/17 with her course complicated by pericardial effusion treated with colcichine.   - CBC every 3 days    Present on Admission:    Hyponatremia    Acute on chronic congestive heart failure, unspecified heart failure type (H)      FEN: Low Na / 2L Fluid Restriction  Code Status: Full  DVT Prophylaxis:  PTA Eliquis  GI Prophylaxis:  Isolation: Contact  Disposition: 3-5 days pending diuresis    Gaston Andrea MD  Cardiology        Chief Complaint: MONTALVO, Weight gain     HPI: Amelia Michel is a 61 year old female with a PMH significant for HTN, chronic diastolic heart failure, atrial tacchyarrhythmias (on Eliquis), cardiac arrest (2017), SSS s/p PPM (12/2019), RA, and DLBCL. Patient reporting MONTALVO, BLE edema, and 8 pound weight gain in the last week. She has been taking her home Lasix 20mg daily as prescribed. Chest X-ray suspicious for pulmonary edema. NT-P BNP 1,913. Troponin T 21-->12. No chest pain reported. EDW per chart review 116-120; admission weight 128. She is admitted for management of heart failure exacerbation.     Past Medical History:   Diagnosis Date     Arthritis      Cardiac arrest (H)      History of blood clots 2017    PIC  line Right arm      History of chemotherapy      History of transfusion      Hypertension      Neuropathy      Physical deconditioning      Positive PPD, 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     VSD (ventricular septal defect)        Past Surgical History:   Procedure Laterality Date     ANESTHESIA CARDIOVERSION N/A 5/17/2017    Procedure: ANESTHESIA CARDIOVERSION;  ANESTHESIA CARDIOVERSION;  Surgeon: GENERIC ANESTHESIA PROVIDER;  Location: [facility]     ANESTHESIA CARDIOVERSION  3/12/2018    Procedure: ANESTHESIA CARDIOVERSION;;  Surgeon: GENERIC ANESTHESIA PROVIDER;  Location: [facility]     ANESTHESIA CARDIOVERSION N/A 3/23/2018    Procedure: ANESTHESIA CARDIOVERSION;  Anesthesia Cardioversion ;  Surgeon: GENERIC ANESTHESIA PROVIDER;  Location: [facility]     ANESTHESIA CARDIOVERSION N/A 4/12/2018    Procedure: ANESTHESIA CARDIOVERSION;  Cardioversion;  Surgeon: GENERIC ANESTHESIA PROVIDER;  Location: [facility] OR     ARTHRODESIS WRIST  2000    Right wrist     BONE MARROW ASPIRATE &BIOPSY  7/12/2017          BONE MARROW BIOPSY W/ ASPIRATION  07/12/2017     CARDIOVERSION      5/17/17, 3/12/18, 3/23/18, 4/14/18,      COLONOSCOPY N/A 11/19/2019    Procedure: Colonoscopy, With Polypectomy And Biopsy;  Surgeon: Pj Vasques MD;  Location: [facility]     EP PACEMAKER N/A 12/13/2019    Procedure: EP Pacemaker;  Surgeon: Pj Trent MD;  Location:  HEART CARDIAC CATH LAB     EXCISE LESION UPPER EXTREMITY Left 5/22/2018    Procedure: EXCISE LESION UPPER EXTREMITY;  Left Arm Wound Excision And Closure, Possible Submuscular Transposition of Ulnar Nerve  (Choice Anesthesia);  Surgeon: Kevin Sheldon MD;  Location:  OR     FOOT SURGERY      4 left and 2 right     FOOT SURGERY      4 Left and 2 Right      IMPLANT PACEMAKER  12/13/2019    Dr China Trent  [facility] Cardiac Cath lab      IMPLANT PACEMAKER       RELEASE CARPAL TUNNEL Bilateral      RELEASE CARPAL TUNNEL BILATERAL       REPAIR VENTRICULAR SEPTAL DEFECT  1969     TRANSPOSITION ULNAR NERVE (ELBOW)  Left 5/22/2018    Procedure: TRANSPOSITION ULNAR NERVE (ELBOW);;  Surgeon: Kevin Sheldon MD;  Location: [facility]     ULNAR NERVE TRANSPOSITION Left 05/22/2018     VSD REPAIR  1969     ZZC FUSION FOOT BONES,SUBTALAR Right 10/20/2020    Procedure: RIGHT SUBTALAR JOINT FUSION AND CALCANEOCUBOID FUSION;  Surgeon: Nemesio Crow MD;  Location: [facility];  Service: Orthopedics       No current facility-administered medications on file prior to encounter.  acetaminophen (TYLENOL) 500 MG tablet, Take 500 mg by mouth every 6 hours as needed for mild pain  alendronate (FOSAMAX) 70 MG tablet, TAKE 1 TABLET(70 MG) BY MOUTH EVERY 7 DAYS  apixaban ANTICOAGULANT (ELIQUIS ANTICOAGULANT) 5 MG tablet, Take 1 tablet (5 mg) by mouth 2 times daily  atenolol (TENORMIN) 50 MG tablet, Take 1 tablet (50 mg) by mouth 2 times daily  calcium carbonate 600 mg-vitamin D 400 units (CALTRATE) 600-400 MG-UNIT per tablet, Take 2 tablets by mouth daily  digoxin (LANOXIN) 125 MCG tablet, Take 1 tablet (125 mcg) by mouth daily  furosemide (LASIX) 40 MG tablet, Take 0.5 tablets (20 mg) by mouth daily  gabapentin (NEURONTIN) 100 MG capsule, Take 1 capsule AM + 1 capsule afternoon + 2 capsules at bedtime  hydroxychloroquine (PLAQUENIL) 200 MG tablet, Take 1 tablet (200 mg) by mouth daily  loratadine (CLARITIN) 10 MG tablet, Take 10 mg by mouth daily  multivitamin, therapeutic with minerals (THERA-VIT-M) TABS tablet, Take 1 tablet by mouth daily  predniSONE (DELTASONE) 1 MG tablet, Take 3 tablets (3 mg) by mouth daily  spironolactone (ALDACTONE) 25 MG tablet, Take 2 tablets (50 mg) by mouth daily  STATIN NOT PRESCRIBED (INTENTIONAL), Please choose reason not prescribed, below (Patient not taking: Reported on 11/3/2022)        Family History   Problem Relation Age of Onset     Breast Cancer Mother      Hypertension Mother      Alzheimer Disease Mother      Cerebrovascular Disease Mother      Hypertension Father      Cerebrovascular Disease Father   "    Atrial fibrillation Father      Lymphoma Father      Prostate Cancer Father      Diabetes Paternal Grandmother      Alzheimer Disease Maternal Grandmother         likely     Arthritis Maternal Grandfather      Pneumonia Maternal Grandfather        Social History     Tobacco Use     Smoking status: Never     Smokeless tobacco: Never   Substance Use Topics     Alcohol use: Not Currently     Comment: none       Allergies   Allergen Reactions     Amiodarone Other (See Comments) and Difficulty breathing     Lethargic and had trouble breathing- occurred in 2017     Blood Transfusion Related (Informational Only) Hives     Hives with platelets. Give benadryl premedication.     Metoprolol Other (See Comments)     Pt and  report that metoprolol does not work for her and also reports feeling unwell with this medication. She has been able to tolerate atenolol, which as worked in controlling her HR.      Other [No Clinical Screening - See Comments]      Penicillin Allergy Skin Test not performed, see antimicrobial management team progress note 7/5/17.       Penicillins      Tape [Adhesive Tape] Rash         ROS:   CONSTITUTIONAL:No report of fevers or chills  RESPIRATORY: No cough, wheezing, SOB, or hemoptysis  CARDIOVASCULAR: see HPI  MUSCULO-SKELETAL: No joint pain/swelling, no muscle pain  NEURO: No paresthesias, syncope, pre-syncope, lightheadness, dizziness or vertigo  ENDOCRINE: No temperature intolerance, no skin/hair changes  PSYCHIATRIC: No change in mood, feeling down/anxious, no change in sleep or appetite  GI: no melena or hematochezia, no change in bowel habits  : no hematuria or dysuria, no hesitancy, dribbling or incontinence  HEME: no easy bruising or bleeding, no history of anemia, no history of blood clots  SKIN: no rashes or sores, no unusual itching      Physical Examination:  Vitals: BP 98/79   Pulse (!) 126   Temp 97.7  F (36.5  C) (Oral)   Resp 16   Ht 1.448 m (4' 9\")   Wt 58.1 kg (128 " lb)   SpO2 100%   BMI 27.70 kg/m    BMI= Body mass index is 27.7 kg/m .    GENERAL APPEARANCE: healthy, alert and no distress  HEENT: no icterus, no xanthelasmas, normal pupil size and reaction, normal palate, mucosa moist  NECK: JVP 12 cm with large C-V wave, brisk carotid upstroke bilaterally  CHEST: lungs clear to auscultation without rales, rhonchi or wheezes, no use of accessory muscles, no retractions  CARDIOVASCULAR: regular rhythm, normal S1 and S2, no S3 or S4; +2/6 systolic murmur LLSB .  ABDOMEN: soft, non tender, +pulsatile hepatomegaly, bowel sounds normal  EXTREMITIES: warm, 3+ pitting edema to knees bilaterally, DP/PT pulses 2+ bilaterally, no clubbing or cyanosis   NEURO: alert and oriented to person/place/time, normal speech, gait and affect  SKIN: no ecchymoses, no rashes      Laboratory:  CMP  Recent Labs   Lab 22  1347 22  1345 22  1316   * 125* 122*   POTASSIUM 4.7 4.9 8.3*   CHLORIDE  --  89*  --    CO2  --  20*  --    ANIONGAP  --  16*  --    GLC 88 86 92   BUN  --  31.4*  --    CR  --  1.19*  --    GFRESTIMATED  --  52*  --    VIKTORIYA  --  9.8  --    MAG  --  2.1  --    PROTTOTAL  --  7.6  --    ALBUMIN  --  4.3  --    BILITOTAL  --  2.8*  --    ALKPHOS  --  125  --    AST  --  125*  --    ALT  --  87*  --      CBC  Recent Labs   Lab 22  1347 22  1316 22  1314   WBC  --   --  5.9   RBC  --   --  4.52   HGB 18.0* 17.3 16.1   HCT 53 51 46.8   MCV  --   --  104*   MCH  --   --  35.6*   MCHC  --   --  34.4   RDW  --   --  15.5*   PLT  --   --  106*       Lab Results   Component Value Date    TROPI <0.015 2019    TROPI 0.043 09/10/2017    TROPI 0.042 09/10/2017    TROPONIN 0.19 (HH) 2017 EK/26/22 Echocardiogram (Dr. Andrea's read):  Normal LV/RV size/function, LVEF=55-60%. Severe TR. RVSP~22 mmHg + RAP~15. Normal estimated left-sided filling pressures, however PA pressure may be underestimated due to significant  "TR.    11/19/22 Device Check:  Device: Medtronic [id number] Claudia XT DR MRI  Normal Device Function  Mode: VVIR  bpm  : 53.4%  Presenting EGM:  with intermittent run of VS  Short V-V intervals: 0  Lead Trends Appear Stable: yes  Estimated battery longevity to RRT = 11.2 years   Anticoagulant: Eliquis  Ventricular Arrhythmia: 11,136 episodes recorded as NSVT since 9/25/22 - 1 sec - 1 min, 154-176 bpm. Stored EGM's reveal irregular v-v intervals with similar morphology to intrinsic rhythm indicating probable AF with RVR  ER RN Notified of Transmission Results    Time Spent on this Encounter   I spent 65 minutes on the unit/floor managing the care of Amelia Michel. Over 50% of my time was spent on the following:   - Counseling the patient and/or family regarding: diagnostic results, prognosis, risks and benefits of treatment options and recommended follow-up  - Coordination of care with the: nurse    Gaston Andrea MD      Clinically Significant Risk Factors Present on Admission     # Hyperkalemia: Highest K = 8.3 mmol/L (Ref range: 3.4-5.3) in last 2 days, will monitor as appropriate  # Hyponatremia: Lowest Na = 122 mmol/L (Ref range: 136-145) in last 2 days, will monitor as appropriate  # Hypocalcemia: Lowest iCa = 3.7 mg/dL (Ref range: 4.4-5.2 mg/dL) in last 2 days, will monitor and replace as appropriate       # Drug Induced Coagulation Defect: home medication list includes an anticoagulant medication  # Thrombocytopenia: Lowest platelets = 106 (Ref range: 150-450) in last 2 days, will monitor for bleeding   # Overweight: Estimated body mass index is 27.7 kg/m  as calculated from the following:    Height as of this encounter: 1.448 m (4' 9\").    Weight as of this encounter: 58.1 kg (128 lb).  Cardiovascular: Cardiac arrest  Cardiac Arrhythmia: Atrial fibrillation: Persistent  Nephrology: Stage 3a (GFR 45-59)       "

## 2022-11-19 NOTE — Clinical Note
dry, intact, no bleeding and no hematoma. 8 and 8.5 fr removed from R fem V.  MP held hemostasis obtained band aid applied

## 2022-11-19 NOTE — ED TRIAGE NOTES
Pt comes in through triage for heart failure exacerbation. Patient has gained about 8 lbs in the last 5 days and has leg swelling and shortness of breath. Pt also having nausea. Pt states has liver involvement with HF. Pt does have a pacemaker. History of cardiac arrest back in 2017.     Triage Assessment     Row Name 11/19/22 1202       Triage Assessment (Adult)    Airway WDL WDL       Respiratory WDL    Respiratory WDL X;all    Rhythm/Pattern, Respiratory shortness of breath       Cardiac WDL    Cardiac WDL X;all  leg swelling from heart failure

## 2022-11-19 NOTE — TELEPHONE ENCOUNTER
Fellow note, covering on-call [facility] pager.    Received page re: Ms Marilu    60 year old women with a past medical history including VSD s/p repair 1969, RA, HTN, Insomnia, Atrial Tachyarrhythmias, cardiac arrest, SSS s/p PPM 12/19, DLBCL, and exudative pericardial effusion. She is followed in [facility] clinic.     Reports weight gain, dyspea/SOB, poor urine output with there current 20 mg PO lasix.    Advised patient to present to nearest ED for evaluation. Patient agreeable, but said she would prefer to have  drive her to [facility] rather than her local ED. Advised again to seek nearest ED and have  call 911 if condition deteriorates.    Luigi Ramsey MD, MSc  Cardiovascular Disease Fellow  [facility]

## 2022-11-19 NOTE — Clinical Note
dry, intact, no bleeding and no hematoma. RRA 6Fr- removed, TR band applied, 11cc air instilled, armboard.  RIJ 7Fr- removed, pressure held to hemostasis, primapore.

## 2022-11-20 ENCOUNTER — APPOINTMENT (OUTPATIENT)
Dept: CARDIOLOGY | Facility: CLINIC | Age: 62
DRG: 273 | End: 2022-11-20
Payer: COMMERCIAL

## 2022-11-20 LAB
ANION GAP SERPL CALCULATED.3IONS-SCNC: 19 MMOL/L (ref 7–15)
ATRIAL RATE - MUSE: 55 BPM
BUN SERPL-MCNC: 34.2 MG/DL (ref 8–23)
CALCIUM SERPL-MCNC: 9.3 MG/DL (ref 8.8–10.2)
CHLORIDE SERPL-SCNC: 91 MMOL/L (ref 98–107)
CREAT SERPL-MCNC: 1.18 MG/DL (ref 0.51–1.17)
DEPRECATED HCO3 PLAS-SCNC: 16 MMOL/L (ref 22–29)
DIASTOLIC BLOOD PRESSURE - MUSE: NORMAL MMHG
ERYTHROCYTE [DISTWIDTH] IN BLOOD BY AUTOMATED COUNT: 15.7 % (ref 10–15)
GFR SERPL CREATININE-BSD FRML MDRD: 52 ML/MIN/1.73M2
GLUCOSE SERPL-MCNC: 77 MG/DL (ref 70–99)
HCT VFR BLD AUTO: 41.8 % (ref 35–53)
HGB BLD-MCNC: 14.4 G/DL (ref 11.7–17.7)
INTERPRETATION ECG - MUSE: NORMAL
LVEF ECHO: NORMAL
MAGNESIUM SERPL-MCNC: 1.9 MG/DL (ref 1.7–2.3)
MCH RBC QN AUTO: 35.8 PG (ref 26.5–33)
MCHC RBC AUTO-ENTMCNC: 34.4 G/DL (ref 31.5–36.5)
MCV RBC AUTO: 104 FL (ref 78–100)
P AXIS - MUSE: NORMAL DEGREES
PLATELET # BLD AUTO: 98 10E3/UL (ref 150–450)
POTASSIUM SERPL-SCNC: 4.1 MMOL/L (ref 3.4–5.3)
PR INTERVAL - MUSE: NORMAL MS
QRS DURATION - MUSE: 144 MS
QT - MUSE: 390 MS
QTC - MUSE: 487 MS
R AXIS - MUSE: 52 DEGREES
RBC # BLD AUTO: 4.02 10E6/UL (ref 3.8–5.9)
SODIUM SERPL-SCNC: 126 MMOL/L (ref 136–145)
SYSTOLIC BLOOD PRESSURE - MUSE: NORMAL MMHG
T AXIS - MUSE: 210 DEGREES
VENTRICULAR RATE- MUSE: 94 BPM
WBC # BLD AUTO: 5.6 10E3/UL (ref 4–11)

## 2022-11-20 PROCEDURE — 85027 COMPLETE CBC AUTOMATED: CPT

## 2022-11-20 PROCEDURE — 250N000012 HC RX MED GY IP 250 OP 636 PS 637

## 2022-11-20 PROCEDURE — 250N000013 HC RX MED GY IP 250 OP 250 PS 637

## 2022-11-20 PROCEDURE — 250N000013 HC RX MED GY IP 250 OP 250 PS 637: Performed by: INTERNAL MEDICINE

## 2022-11-20 PROCEDURE — 999N000127 HC STATISTIC PERIPHERAL IV START W US GUIDANCE

## 2022-11-20 PROCEDURE — 80048 BASIC METABOLIC PNL TOTAL CA: CPT

## 2022-11-20 PROCEDURE — 250N000011 HC RX IP 250 OP 636

## 2022-11-20 PROCEDURE — 83735 ASSAY OF MAGNESIUM: CPT | Performed by: INTERNAL MEDICINE

## 2022-11-20 PROCEDURE — 214N000001 HC R&B CCU UMMC

## 2022-11-20 PROCEDURE — 99233 SBSQ HOSP IP/OBS HIGH 50: CPT | Mod: 25

## 2022-11-20 PROCEDURE — 99207 PR SC NO CHARGE VISIT: CPT

## 2022-11-20 PROCEDURE — 80162 ASSAY OF DIGOXIN TOTAL: CPT

## 2022-11-20 PROCEDURE — 93306 TTE W/DOPPLER COMPLETE: CPT

## 2022-11-20 PROCEDURE — 36415 COLL VENOUS BLD VENIPUNCTURE: CPT

## 2022-11-20 PROCEDURE — 93306 TTE W/DOPPLER COMPLETE: CPT | Mod: 26 | Performed by: INTERNAL MEDICINE

## 2022-11-20 RX ORDER — MAGNESIUM OXIDE 400 MG/1
400 TABLET ORAL EVERY 4 HOURS
Status: COMPLETED | OUTPATIENT
Start: 2022-11-20 | End: 2022-11-20

## 2022-11-20 RX ORDER — FUROSEMIDE 10 MG/ML
80 INJECTION INTRAMUSCULAR; INTRAVENOUS
Status: DISCONTINUED | OUTPATIENT
Start: 2022-11-20 | End: 2022-11-23

## 2022-11-20 RX ORDER — FUROSEMIDE 10 MG/ML
40 INJECTION INTRAMUSCULAR; INTRAVENOUS 2 TIMES DAILY
Status: DISCONTINUED | OUTPATIENT
Start: 2022-11-20 | End: 2022-11-20

## 2022-11-20 RX ADMIN — FUROSEMIDE 80 MG: 10 INJECTION, SOLUTION INTRAVENOUS at 16:32

## 2022-11-20 RX ADMIN — ACETAMINOPHEN 650 MG: 325 TABLET, FILM COATED ORAL at 00:20

## 2022-11-20 RX ADMIN — LORATADINE 10 MG: 10 TABLET ORAL at 09:09

## 2022-11-20 RX ADMIN — FUROSEMIDE 40 MG: 10 INJECTION, SOLUTION INTRAVENOUS at 09:12

## 2022-11-20 RX ADMIN — APIXABAN 5 MG: 5 TABLET, FILM COATED ORAL at 09:08

## 2022-11-20 RX ADMIN — GABAPENTIN 100 MG: 100 CAPSULE ORAL at 04:17

## 2022-11-20 RX ADMIN — HYDROXYCHLOROQUINE SULFATE 200 MG: 200 TABLET, FILM COATED ORAL at 20:08

## 2022-11-20 RX ADMIN — MAGNESIUM OXIDE TAB 400 MG (241.3 MG ELEMENTAL MG) 400 MG: 400 (241.3 MG) TAB at 16:33

## 2022-11-20 RX ADMIN — DIGOXIN 125 MCG: 125 TABLET ORAL at 09:09

## 2022-11-20 RX ADMIN — ATENOLOL 50 MG: 25 TABLET ORAL at 20:09

## 2022-11-20 RX ADMIN — APIXABAN 5 MG: 5 TABLET, FILM COATED ORAL at 20:00

## 2022-11-20 RX ADMIN — Medication 3 MG: at 09:10

## 2022-11-20 RX ADMIN — SPIRONOLACTONE 50 MG: 25 TABLET ORAL at 09:08

## 2022-11-20 RX ADMIN — GABAPENTIN 100 MG: 100 CAPSULE ORAL at 09:23

## 2022-11-20 RX ADMIN — GABAPENTIN 100 MG: 100 CAPSULE ORAL at 20:00

## 2022-11-20 RX ADMIN — ATENOLOL 50 MG: 25 TABLET ORAL at 09:10

## 2022-11-20 RX ADMIN — Medication 200 MG: at 20:02

## 2022-11-20 RX ADMIN — GABAPENTIN 100 MG: 100 CAPSULE ORAL at 16:33

## 2022-11-20 RX ADMIN — MAGNESIUM OXIDE TAB 400 MG (241.3 MG ELEMENTAL MG) 400 MG: 400 (241.3 MG) TAB at 12:33

## 2022-11-20 ASSESSMENT — ACTIVITIES OF DAILY LIVING (ADL)
ADLS_ACUITY_SCORE: 25
ADLS_ACUITY_SCORE: 27
ADLS_ACUITY_SCORE: 25
ADLS_ACUITY_SCORE: 25
ADLS_ACUITY_SCORE: 27

## 2022-11-20 NOTE — PLAN OF CARE
D AVSS with sat's 96% on room air. Heart irregular Afib with runs of aberrant Afib up to 15 beats at a rate of 150's to 160's which MD is aware. Patient reports having no symptoms. Lungs slightly decreased in bases otherwise clear. Voiced mild c/o generalized pain which was relieved with Tylenol. Voiding adequate amounts and weight was approx the same this AM.  I Vital's, assessment and med's per order.   A Resting in bed with call light in reach.   P Continue to monitor and update MD with changes.

## 2022-11-20 NOTE — PROGRESS NOTES
"    [facility]   Cardiology   Progress Note     ASSESSMENT/PLAN:  Amelia Michel is a 61 year old female with a PMH significant for HTN, chronic diastolic heart failure, atrial tacchyarrhythmias (on Eliquis), cardiac arrest (2017), SSS s/p PPM (12/2019), RA, and DLBCL who is admitted for heart failure exacerbation.     Interval History: No acute events overnight. Patient declining 80mg dose of IV Lasix as she feels it is \"too much\" and reports her blood pressures have dropped in the past. Will reduce to 40mg BID. Patient denies PND or orthopnea. Non-pitting BLE edema present. Continues to have runs of aberrant atrial fibrillation for which she is mostly asymptomatic for. Notes that she occasionally feels her pacemaker \"firing more.\"     Changes Today:  - Continue IV Lasix; inadequate output with 40mg so will increase back to 80mg   - EP consult tomorrow AM for rhythm confirmation/recommendations   - Pharmacy liaison consult for SGLTi cost     # Acute on Chronic Diastolic Heart Failure (EF 50%) NYHA Class III, AHA/ACC Stage C  # Severe Tricuspid Regurgitation   # Hypertension  Patient reporting MONTALVO, BLE edema, and 8 pound weight gain in the last week. She has been taking her home Lasix 20mg daily as prescribed. Chest X-ray suspicious for pulmonary edema. NT-P BNP 1,913. Troponin T 21-->12. No chest pain reported. EDW per chart review 116-120; admission weight 128. Denies fever, cough, PND, or orthopnea. She admits symptoms are similar to previous CHF exacerbations she has had in the past. She follows in our [facility].    Per Dr. Andrea's read of TTE 8/2022 and by clinical exam, TR is clearly severe and accounts in part for predominant presentation of clinical right HF. Echocardiogram with severe TR and EF   - Fluid Status: Hypervolemic; s/p IV Lasix 10mg in ED. IV Lasix 80mg BID.   - Should discharge on PO Torsemide for severe TR  - Pharmacy liaison consult for SGLT2 cost   - " "Continue PTA Spironolactone 50mg daily  - Continue PTA Atenolol 50mg BID  - Daily standing weights  - Strict I&O's  - Low Na Diet / 2L Fluid restriction  - Daily BMP; Keep K+ > 4.0, Mg > 2.0. Protocols ordered.     # SSS s/p PPM (2019)  # Atrial Tachyarrhythmias  Cardiac history dates back to May 2017 with atrial tachyarrhythmias and EF of 40-45% at that time. Patient feels as if her pacemaker has been \"firing more\" than her baseline. Device check report showing 11,136 episodes of \"NSVT\" since 9/25/22 although EGM's reveal irregular v-v intervals with similar morphology to intrinsic rhythm indicating probable AF w/ RVR. [id number] = 2 (+ htn, + gender). Aberrancy vs PVC's?  - Telemetry  - EP consult tomorrow AM for rhythm clarification/recommendations   - PRN EKG's   - Continue PTA Digoxin 125mcg   - Continue PTA Eliquis 5mg BID   - Continue PTA Atenolol 50mg BID      # Chronic Hyponatremia  Chronically low sodium since April of 2022. Could be related to hypervolemic state.   - Diuresis per above  - Daily BMP      # Rheumatoid Arthritis  - Continue PTA Prednisone 3mg daily  - Continue PTA Hydroxychloroquine 200mg daily      # Hx of Diffuse Large B-Cell Lymphoma  # Hx of Cardiac Arrest (2017)  Underwent hospitalization 6/2017 due to VF arrest in setting of normal angiogram and found to have DLBCL complicated by masses on the coronary cusps and LVOT. She underwent R-EPOCH one cycle and 5 subsequent cycles of R-CHOP completed in 1220/17 with her course complicated by pericardial effusion treated with colcichine.   - CBC every 3 days    FEN: 2g Na Diet / 2L Fluid Restriction  Code Status: Full Code  Prophylaxis:  PTA Eliquis  Isolation: Contact  Disposition: 2-5 days pending work up     Patient seen and discussed with Dr. Andrea, who agrees with above plan.    Gema JOHNSON, CNP  [facility] Cardiology Team    Physical Exam:  Temp:  [97.2  F (36.2  C)-98  F (36.7  C)] 97.2  F (36.2  C)  Pulse:  [] 115  Resp:  " [16-18] 18  BP: ()/() 96/85  Cuff Mean (mmHg):  [92-96] 92  SpO2:  [96 %-100 %] 98 %    I/O:   Intake/Output Summary (Last 24 hours) at 11/20/2022 0856  Last data filed at 11/20/2022 0500  Gross per 24 hour   Intake --   Output 1225 ml   Net -1225 ml       Wt:   Wt Readings from Last 5 Encounters:   11/20/22 57.6 kg (127 lb)   11/03/22 55.8 kg (123 lb)   09/16/22 54.7 kg (120 lb 8 oz)   09/14/22 54.9 kg (121 lb)   08/26/22 55.5 kg (122 lb 6.4 oz)       General: NAD  HEENT:  PERRLA, EOMI.   Neck: JVD 1/3 way up neck.   CV: RRR. No murmur appreciated. No rubs or gallops. Peripheral radial pulse intact.  Resp: No increased work of breathing or use of accessory muscles, breathing comfortably on room air.  Lung sounds clear throughout/bilaterally  Abdomen:  Normal active bowel sounds.  Abdomen is soft. No distension, non-tender to palpation.    Extremities: Warm. Capillary refill less than 3 sec. 3/4 radial pulses bilaterally.  3/4 pedal pulses bilaterally. BLE pre-tibial edema. No cyanosis or clubbing.  Skin:  Warm and dry. No erythema, rashes, ulceration or diaphoresis.  Neuro: Alert and oriented x3.      Medications:    apixaban ANTICOAGULANT  5 mg Oral BID     atenolol  50 mg Oral BID     digoxin  125 mcg Oral Daily     furosemide  40 mg Intravenous BID     gabapentin  100 mg Oral Q6H     hydroxychloroquine  200 mg Oral Daily     loratadine  10 mg Oral Daily     magnesium oxide  200 mg Oral Daily     predniSONE  3 mg Oral Daily     sodium chloride (PF)  3 mL Intracatheter Q8H     spironolactone  50 mg Oral Daily       - MEDICATION INSTRUCTIONS -       - MEDICATION INSTRUCTIONS -         Labs:   [facility]  Recent Labs   Lab 11/19/22  1347 11/19/22  1345 11/19/22  1316   * 125* 122*   POTASSIUM 4.7 4.9 8.3*   CHLORIDE  --  89*  --    CO2  --  20*  --    ANIONGAP  --  16*  --    GLC 88 86 92   BUN  --  31.4*  --    CR  --  1.19*  --    GFRESTIMATED  --  52*  --    VIKTORIYA  --  9.8  --    MAG  --  2.1  --     PROTTOTAL  --  7.6  --    ALBUMIN  --  4.3  --    BILITOTAL  --  2.8*  --    ALKPHOS  --  125  --    AST  --  125*  --    ALT  --  87*  --      CBC  Recent Labs   Lab 22  0602 22  1347 22  1316 22  1314   WBC 5.6  --   --  5.9   RBC 4.02  --   --  4.52   HGB 14.4 18.0* 17.3 16.1   HCT 41.8 53 51 46.8   *  --   --  104*   MCH 35.8*  --   --  35.6*   MCHC 34.4  --   --  34.4   RDW 15.7*  --   --  15.5*   PLT 98*  --   --  106*     INR  Recent Labs   Lab 22  1314   INR 4.57*     Arterial Blood GasNo lab results found in last 7 days.    Diagnostics:  22 EK/26/22 Echocardiogram (Dr. Andrea's read):  Normal LV/RV size/function, LVEF=55-60%. Severe TR. RVSP~22 mmHg + RAP~15. Normal estimated left-sided filling pressures, however PA pressure may be underestimated due to significant TR.     22 Device Check:  Device: Medtronic [id number] Donovan XT DR GALLEGOS  Normal Device Function  Mode: VVIR  bpm  : 53.4%  Presenting EGM:  with intermittent run of VS  Short V-V intervals: 0  Lead Trends Appear Stable: yes  Estimated battery longevity to RRT = 11.2 years   Anticoagulant: Eliquis  Ventricular Arrhythmia: 11,136 episodes recorded as NSVT since 22 - 1 sec - 1 min, 154-176 bpm. Stored EGM's reveal irregular v-v intervals with similar morphology to intrinsic rhythm indicating probable AF with RVR  ER RN Notified of Transmission Results    Recent Results (from the past 24 hour(s))   Chest XR,  PA & LAT    Narrative    EXAM: XR CHEST 2 VIEWS 2022 12:41 PM    HISTORY: CHF, short of breath.    COMPARISON: 2019.    TECHNIQUE: Upright frontal and lateral views of the chest.    FINDINGS: Midline trachea. Stable enlarged cardiac silhouette. Stable  post surgical changes of median sternotomy. Left chest wall  implantable pacemaker leads project over the right atrium and  ventricle.  Engorged but distinct pulmonary vasculature. Mild bibasilar  streaky  opacities. No evidence consolidation. No pneumothorax or pleural  effusion. Upper abdomen within normal limits.      Impression    IMPRESSION:   Cardiomegaly with engorged pulmonary vasculature, with mild perihilar  and bibasilar interstitial prominence, suspicious for pulmonary edema.  No consolidation.    I have personally reviewed the examination and initial interpretation  and I agree with the findings.    MAYELA BAR DO         SYSTEM ID:  [id number]   Cardiac Device Check - Remote   Result Value    Date Time Interrogation Session [id number]    Implantable Pulse Generator  Medtronic    Implantable Pulse Generator Model W1DR01 Claudia XT DR MRI    Implantable Pulse Generator Serial Number [id number]    Type Interrogation Session Remote    Clinic Name [facility]    Implantable Pulse Generator Type Pacemaker    Implantable Pulse Generator Implant Date 20191213    Implantable Lead  Medtronic    Implantable Lead Model 5076 CapSureFix Novus MRI SureScan    Implantable Lead Serial Number [id number]    Implantable Lead Implant Date 20191213    Implantable Lead Polarity Type Bipolar Lead    Implantable Lead Location Detail 1 APPENDAGE    Implantable Lead Special Function Length 52 cm    Implantable Lead Location Right Atrium    Implantable Lead  Medtronic    Implantable Lead Model 5076 CapSureFix Novus MRI SureScan    Implantable Lead Serial Number [id number]    Implantable Lead Implant Date 20191213    Implantable Lead Polarity Type Bipolar Lead    Implantable Lead Location Detail 1 SEPTUM    Implantable Lead Special Function Length 58 cm    Implantable Lead Location Right Ventricle    Reyes Setting Mode (NBG Code) VVIR    Reyes Setting Lower Rate Limit 60    Reyes Setting Maximum Sensor Rate 130    Reyes Setting Hysterisis Rate DISABLED    Lead Channel Setting Sensing Polarity Bipolar    Lead Channel Setting Sensing Anode Location Right Atrium     Lead Channel Setting Sensing Anode Terminal Ring    Lead Channel Setting Sensing Cathode Location Right Atrium    Lead Channel Setting Sensing Cathode Terminal Tip    Lead Channel Setting Sensing Sensitivity Off    Lead Channel Setting Sensing Polarity Bipolar    Lead Channel Setting Sensing Anode Location Right Ventricle    Lead Channel Setting Sensing Anode Terminal Ring    Lead Channel Setting Sensing Cathode Location Right Ventricle    Lead Channel Setting Sensing Cathode Terminal Tip    Lead Channel Setting Sensing Sensitivity 0.9    Lead Channel Setting Pacing Polarity Bipolar    Lead Channel Setting Pacing Anode Location Right Ventricle    Lead Channel Setting Pacing Anode Terminal Ring    Lead Channel Setting Sensing Cathode Location Right Ventricle    Lead Channel Setting Sensing Cathode Terminal Tip    Lead Channel Setting Pacing Pulse Width 0.4    Lead Channel Setting Pacing Amplitude 2    Lead Channel Setting Pacing Capture Mode Adaptive    Zone Setting Type Category VF    Zone Setting Type Category VT    Zone Setting Type Category VT    Zone Setting Type Category VT    Zone Setting Detection Interval 400    Zone Setting Type Category ATRIAL_FIBRILLATION    Zone Setting Type Category AT/AF    Lead Channel Impedance Value 418    Lead Channel Impedance Value 323    Lead Channel Sensing Intrinsic Amplitude 1.75    Lead Channel Sensing Intrinsic Amplitude 1    Lead Channel Impedance Value 475    Lead Channel Impedance Value 380    Lead Channel Sensing Intrinsic Amplitude 8.875    Lead Channel Sensing Intrinsic Amplitude 8.875    Lead Channel Pacing Threshold Amplitude 0.625    Lead Channel Pacing Threshold Pulse Width 0.4    Battery Date Time of Measurements 50923102466162    Battery Status OK    Battery RRT Trigger 2.625    Battery Remaining Longevity 134    Battery Voltage 3.02    Reyes Statistic Date Time Start 33575233834158    Reyes Statistic Date Time End 99921668749855    Reyes Statistic RA Percent  Paced 0    Reyes Statistic RV Percent Paced 53.38    Reyes Statistic AP  Percent 0    Reyes Statistic AS  Percent 53.38    Reyes Statistic AP VS Percent 0    Reyes Statistic AS VS Percent 46.62    Episode Statistic Recent Count 0    Episode Statistic Type Category Patient Activated    Episode Statistic Recent Count 0    Episode Statistic Type Category SVT    Episode Statistic Recent Count 11,132    Episode Statistic Type Category VT    Episode Statistic Recent Count 4    Episode Statistic Type Category VT    Episode Statistic Recent Date Time Start 40042842837083    Episode Statistic Recent Date Time End 14149193025032    Episode Statistic Recent Date Time Start 96046279458196    Episode Statistic Recent Date Time End 22695187501050    Episode Statistic Recent Date Time Start 66409164748143    Episode Statistic Recent Date Time End 53356737774777    Episode Statistic Recent Date Time Start 98380547948128    Episode Statistic Recent Date Time End 35261069334483    Episode Statistic Total Count 3,691    Episode Statistic Type Category AT/AF    Episode Statistic Total Count 0    Episode Statistic Type Category Patient Activated    Episode Statistic Total Count 141    Episode Statistic Type Category SVT    Episode Statistic Total Count 11,313    Episode Statistic Type Category VT    Episode Statistic Total Count 5    Episode Statistic Type Category VT    Episode Statistic Total Date Time Start 44846316896191    Episode Statistic Total Date Time End 53844818123744    Episode Statistic Total Date Time Start 48360199067152    Episode Statistic Total Date Time End 95733774270515    Episode Statistic Total Date Time Start 50882925551118    Episode Statistic Total Date Time End 81825022870447    Episode Statistic Total Date Time Start 66999918467119    Episode Statistic Total Date Time End 32944056914412    Episode Statistic Total Date Time Start 37290808869765    Episode Statistic Total Date Time End 42477854556556     Episode Identifier 14,883    Episode Type Category VT1_MONITOR    Episode Date Time 31912517948160    Episode Duration 60    Episode Identifier 9,698    Episode Type Category VT1_MONITOR    Episode Date Time 36631265215026    Episode Duration 31    Episode Identifier 9,540    Episode Type Category VT1_MONITOR    Episode Date Time 17612150044554    Episode Duration 40    Episode Identifier 9,408    Episode Type Category VT1_MONITOR    Episode Date Time 42791141311733    Episode Duration 52    Episode Identifier 15,171    Episode Type Category VT    Episode Date Time 61264242666518    Episode Duration 1    Episode Identifier 15,170    Episode Type Category VT    Episode Date Time 59260836575558    Episode Duration 0    Episode Identifier 15,169    Episode Type Category VT    Episode Date Time 36597273276732    Episode Duration 1    Episode Identifier 15,168    Episode Type Category VT    Episode Date Time 44101943241100    Episode Duration 1    Episode Identifier 15,167    Episode Type Category VT    Episode Date Time 98773994938099    Episode Duration 1    Episode Identifier 15,166    Episode Type Category VT    Episode Date Time 17512038656662    Episode Duration 1    Episode Identifier 15,165    Episode Type Category VT    Episode Date Time 81572344568989    Episode Duration 1    Episode Identifier 15,164    Episode Type Category VT    Episode Date Time 65034456870215    Episode Duration 0    Episode Identifier 15,163    Episode Type Category VT    Episode Date Time 57545069381447    Episode Duration 1    Episode Identifier 15,162    Episode Type Category VT    Episode Date Time 36488791750476    Episode Duration 0    Episode Identifier 15,161    Episode Type Category VT    Episode Date Time 29295361634982    Episode Duration 2    Episode Identifier 15,160    Episode Type Category VT    Episode Date Time 38990275683309    Episode Duration 0    Episode Identifier 15,159    Episode Type Category VT    Episode Date  Time 20221119121646    Episode Duration 1    Episode Identifier 15,158    Episode Type Category VT    Episode Date Time 20221119121615    Episode Duration 1    Episode Identifier 15,157    Episode Type Category VT    Episode Date Time 20221119121517    Episode Duration 1    Narrative    Medtronic Carelink Express remote pacemaker transmission received from the ER and reviewed. Device transmission sent per MD orders.    Device: Medtronic W1DR01 [place] XT DR MRI  Normal Device Function  Mode: VVIR  bpm  : 53.4%  Presenting EGM:  with intermittent run of VS  Short V-V intervals: 0  Lead Trends Appear Stable: yes  Estimated battery longevity to RRT = 11.2 years   Anticoagulant: Eliquis  Ventricular Arrhythmia: 11,136 episodes recorded as NSVT since 9/25/22 - 1 sec - 1 min, 154-176 bpm. Stored EGM's reveal irregular v-v intervals with similar morphology to intrinsic rhythm indicating probable AF with RVR  ER RN Notified of Transmission Results    TAMMY David RN    Remote pacemaker transmission    I have reviewed and interpreted the device interrogation, settings, programming and nurse's summary. The device is functioning within normal device parameters. I agree with the current findings, assessment and plan.   Abdomen US, limited (RUQ only)    Narrative    EXAMINATION: Limited Abdominal Ultrasound, 11/19/2022 4:10 PM     COMPARISON: CT 9/10/2019    HISTORY: Elevated bilirubin and LFTs, mild right upper quadrant  tenderness    FINDINGS:     Fluid: Trace ascites in the right upper quadrant.    Liver: The liver demonstrates normal echotexture, measuring 16.6 cm in  craniocaudal dimension. There is no focal mass.     Gallbladder: Gallbladder wall is diffusely thickened measuring 4 mm.  No gallstones or biliary sludge.    Bile Ducts: Both the intra- and extrahepatic biliary system are of  normal caliber.  The common bile duct measures 4 mm in diameter.    Pancreas: Visualized portions of the head and body of the  pancreas are  unremarkable. Pancreas is partially obscured    Kidney: The right kidney measures 9.4 cm long. There is no  hydronephrosis or hydroureter, no shadowing renal calculi, cystic  lesion or mass.       Impression    IMPRESSION:       1. Borderline hepatomegaly. Prominent hepatic veins; consider  correlation for congestive hepatomegaly secondary to cardiac  dysfunction. No focal liver lesion demonstrated.  2. Trace ascites.  3. Diffusely thickened gallbladder wall, nonspecific, possibly  congestive with cardiac dysfunction.    I have personally reviewed the examination and initial interpretation  and I agree with the findings.    LUKE HOGUE MD         SYSTEM ID:  [id number]       Time Spent on this Encounter   I spent 35 minutes on the unit/floor managing the care of Amelia Michel. Over 50% of my time was spent on the following:   - Counseling the patient and/or family regarding: diagnostic results and prognosis  - Coordination of care with the: consultant(s)    ELIZABETH Izquierdo CNP

## 2022-11-20 NOTE — PLAN OF CARE
End of Shift Summary. See flowsheets for vital signs and detailed assessment.    Changes this shift: Pt is reporting feeling nausea and tired r/t abnormal heart rhythm. Provider is aware. Mg replaced per protocol. Scheduled mag changed to HS as requested by pt. Pt tolerated well.     Plan:  Will continue with current plan of care. Changes and concerns will be reported to provider.

## 2022-11-20 NOTE — PROVIDER NOTIFICATION
MD updated regarding patient having longer runs of wide complex(aberrant) Afib with the longest being 15 beats and rates increased to 150's to 160's. Asymptomatic per patient.

## 2022-11-20 NOTE — PROGRESS NOTES
"D/UER admit for CHF.she got 1 dose of Lasix in ER and BP in 90's so ER RN did not give additional dose of Lasix. History of HTN, CHF, Atrial tachycardias, arrest 2107, pacer in 2019, rheumatoid arthritis, severe tricuspid regurg, lg B cell lymphoma. She voided 800 in ER. She told me she refused the second dose of IV Lasix in ER and refused additional ordered dose here. Says \"itis too much\" for her size and she already peed a lot. She says she takes mag at night for leg cramps and wants this ordered.she had supper and took meds. She has numbness and tingling in hands and feet. She is incontinent of bowel and bladder and has urgency. She requested bedside commode and says her gait is wobbly at baseline at times, as she has neuropathy and intermittently uses a walker at home. She says she likes to wear a pulllup. She says she has some ST memory loss from past arrest and cancer treatment, and so has a health care directive and  makes health care decisions.for her. She says they have given us these documents in the past. But,  should bring them in again tomorrow so we can fax them in. Also note on the board for team to write this as order until we get documents. EKG in ER showed  AFib with wide complex tachy rhythms. Additonal 12 lead done- a fib with wide complex tachy frequent 3-7 beat runs around 150.  A/she says she can feel them once in a while, but mostly feels okay with this rhythm today  P/monitor for changes, give mag when ordered and available  "

## 2022-11-21 LAB
ANION GAP SERPL CALCULATED.3IONS-SCNC: 13 MMOL/L (ref 7–15)
BUN SERPL-MCNC: 30 MG/DL (ref 8–23)
CALCIUM SERPL-MCNC: 9 MG/DL (ref 8.8–10.2)
CHLORIDE SERPL-SCNC: 93 MMOL/L (ref 98–107)
CREAT SERPL-MCNC: 1.21 MG/DL (ref 0.51–1.17)
DEPRECATED HCO3 PLAS-SCNC: 23 MMOL/L (ref 22–29)
DIGOXIN SERPL-MCNC: 1.3 NG/ML (ref 0.6–2)
GFR SERPL CREATININE-BSD FRML MDRD: 51 ML/MIN/1.73M2
GLUCOSE SERPL-MCNC: 70 MG/DL (ref 70–99)
HOLD SPECIMEN: NORMAL
MAGNESIUM SERPL-MCNC: 1.9 MG/DL (ref 1.7–2.3)
MDC_IDC_EPISODE_DTM: NORMAL
MDC_IDC_EPISODE_DURATION: 0 S
MDC_IDC_EPISODE_DURATION: 1 S
MDC_IDC_EPISODE_DURATION: 2 S
MDC_IDC_EPISODE_DURATION: 31 S
MDC_IDC_EPISODE_DURATION: 40 S
MDC_IDC_EPISODE_DURATION: 52 S
MDC_IDC_EPISODE_DURATION: 60 S
MDC_IDC_EPISODE_ID: 9408
MDC_IDC_EPISODE_ID: 9540
MDC_IDC_EPISODE_ID: 9698
MDC_IDC_EPISODE_ID: NORMAL
MDC_IDC_EPISODE_TYPE: NORMAL
MDC_IDC_LEAD_IMPLANT_DT: NORMAL
MDC_IDC_LEAD_IMPLANT_DT: NORMAL
MDC_IDC_LEAD_LOCATION: NORMAL
MDC_IDC_LEAD_LOCATION: NORMAL
MDC_IDC_LEAD_LOCATION_DETAIL_1: NORMAL
MDC_IDC_LEAD_LOCATION_DETAIL_1: NORMAL
MDC_IDC_LEAD_MFG: NORMAL
MDC_IDC_LEAD_MFG: NORMAL
MDC_IDC_LEAD_MODEL: NORMAL
MDC_IDC_LEAD_MODEL: NORMAL
MDC_IDC_LEAD_POLARITY_TYPE: NORMAL
MDC_IDC_LEAD_POLARITY_TYPE: NORMAL
MDC_IDC_LEAD_SERIAL: NORMAL
MDC_IDC_LEAD_SERIAL: NORMAL
MDC_IDC_LEAD_SPECIAL_FUNCTION: NORMAL
MDC_IDC_LEAD_SPECIAL_FUNCTION: NORMAL
MDC_IDC_MSMT_BATTERY_DTM: NORMAL
MDC_IDC_MSMT_BATTERY_REMAINING_LONGEVITY: 134 MO
MDC_IDC_MSMT_BATTERY_RRT_TRIGGER: 2.62
MDC_IDC_MSMT_BATTERY_STATUS: NORMAL
MDC_IDC_MSMT_BATTERY_VOLTAGE: 3.02 V
MDC_IDC_MSMT_LEADCHNL_RA_IMPEDANCE_VALUE: 323 OHM
MDC_IDC_MSMT_LEADCHNL_RA_IMPEDANCE_VALUE: 418 OHM
MDC_IDC_MSMT_LEADCHNL_RA_SENSING_INTR_AMPL: 1 MV
MDC_IDC_MSMT_LEADCHNL_RA_SENSING_INTR_AMPL: 1.75 MV
MDC_IDC_MSMT_LEADCHNL_RV_IMPEDANCE_VALUE: 380 OHM
MDC_IDC_MSMT_LEADCHNL_RV_IMPEDANCE_VALUE: 475 OHM
MDC_IDC_MSMT_LEADCHNL_RV_PACING_THRESHOLD_AMPLITUDE: 0.62 V
MDC_IDC_MSMT_LEADCHNL_RV_PACING_THRESHOLD_PULSEWIDTH: 0.4 MS
MDC_IDC_MSMT_LEADCHNL_RV_SENSING_INTR_AMPL: 8.88 MV
MDC_IDC_MSMT_LEADCHNL_RV_SENSING_INTR_AMPL: 8.88 MV
MDC_IDC_PG_IMPLANT_DTM: NORMAL
MDC_IDC_PG_MFG: NORMAL
MDC_IDC_PG_MODEL: NORMAL
MDC_IDC_PG_SERIAL: NORMAL
MDC_IDC_PG_TYPE: NORMAL
MDC_IDC_SESS_CLINIC_NAME: NORMAL
MDC_IDC_SESS_DTM: NORMAL
MDC_IDC_SESS_TYPE: NORMAL
MDC_IDC_SET_BRADY_HYSTRATE: NORMAL
MDC_IDC_SET_BRADY_LOWRATE: 60 {BEATS}/MIN
MDC_IDC_SET_BRADY_MAX_SENSOR_RATE: 130 {BEATS}/MIN
MDC_IDC_SET_BRADY_MODE: NORMAL
MDC_IDC_SET_LEADCHNL_RA_SENSING_ANODE_ELECTRODE_1: NORMAL
MDC_IDC_SET_LEADCHNL_RA_SENSING_ANODE_LOCATION_1: NORMAL
MDC_IDC_SET_LEADCHNL_RA_SENSING_CATHODE_ELECTRODE_1: NORMAL
MDC_IDC_SET_LEADCHNL_RA_SENSING_CATHODE_LOCATION_1: NORMAL
MDC_IDC_SET_LEADCHNL_RA_SENSING_POLARITY: NORMAL
MDC_IDC_SET_LEADCHNL_RA_SENSING_SENSITIVITY: NORMAL
MDC_IDC_SET_LEADCHNL_RV_PACING_AMPLITUDE: 2 V
MDC_IDC_SET_LEADCHNL_RV_PACING_ANODE_ELECTRODE_1: NORMAL
MDC_IDC_SET_LEADCHNL_RV_PACING_ANODE_LOCATION_1: NORMAL
MDC_IDC_SET_LEADCHNL_RV_PACING_CAPTURE_MODE: NORMAL
MDC_IDC_SET_LEADCHNL_RV_PACING_CATHODE_ELECTRODE_1: NORMAL
MDC_IDC_SET_LEADCHNL_RV_PACING_CATHODE_LOCATION_1: NORMAL
MDC_IDC_SET_LEADCHNL_RV_PACING_POLARITY: NORMAL
MDC_IDC_SET_LEADCHNL_RV_PACING_PULSEWIDTH: 0.4 MS
MDC_IDC_SET_LEADCHNL_RV_SENSING_ANODE_ELECTRODE_1: NORMAL
MDC_IDC_SET_LEADCHNL_RV_SENSING_ANODE_LOCATION_1: NORMAL
MDC_IDC_SET_LEADCHNL_RV_SENSING_CATHODE_ELECTRODE_1: NORMAL
MDC_IDC_SET_LEADCHNL_RV_SENSING_CATHODE_LOCATION_1: NORMAL
MDC_IDC_SET_LEADCHNL_RV_SENSING_POLARITY: NORMAL
MDC_IDC_SET_LEADCHNL_RV_SENSING_SENSITIVITY: 0.9 MV
MDC_IDC_SET_ZONE_DETECTION_INTERVAL: 400 MS
MDC_IDC_SET_ZONE_TYPE: NORMAL
MDC_IDC_STAT_BRADY_AP_VP_PERCENT: 0 %
MDC_IDC_STAT_BRADY_AP_VS_PERCENT: 0 %
MDC_IDC_STAT_BRADY_AS_VP_PERCENT: 53.38 %
MDC_IDC_STAT_BRADY_AS_VS_PERCENT: 46.62 %
MDC_IDC_STAT_BRADY_DTM_END: NORMAL
MDC_IDC_STAT_BRADY_DTM_START: NORMAL
MDC_IDC_STAT_BRADY_RA_PERCENT_PACED: 0 %
MDC_IDC_STAT_BRADY_RV_PERCENT_PACED: 53.38 %
MDC_IDC_STAT_EPISODE_RECENT_COUNT: 0
MDC_IDC_STAT_EPISODE_RECENT_COUNT: 0
MDC_IDC_STAT_EPISODE_RECENT_COUNT: 4
MDC_IDC_STAT_EPISODE_RECENT_COUNT: NORMAL
MDC_IDC_STAT_EPISODE_RECENT_COUNT_DTM_END: NORMAL
MDC_IDC_STAT_EPISODE_RECENT_COUNT_DTM_START: NORMAL
MDC_IDC_STAT_EPISODE_TOTAL_COUNT: 0
MDC_IDC_STAT_EPISODE_TOTAL_COUNT: 141
MDC_IDC_STAT_EPISODE_TOTAL_COUNT: 3691
MDC_IDC_STAT_EPISODE_TOTAL_COUNT: 5
MDC_IDC_STAT_EPISODE_TOTAL_COUNT: NORMAL
MDC_IDC_STAT_EPISODE_TOTAL_COUNT_DTM_END: NORMAL
MDC_IDC_STAT_EPISODE_TOTAL_COUNT_DTM_START: NORMAL
MDC_IDC_STAT_EPISODE_TYPE: NORMAL
POTASSIUM SERPL-SCNC: 3.9 MMOL/L (ref 3.4–5.3)
SODIUM SERPL-SCNC: 129 MMOL/L (ref 136–145)

## 2022-11-21 PROCEDURE — 250N000013 HC RX MED GY IP 250 OP 250 PS 637: Performed by: INTERNAL MEDICINE

## 2022-11-21 PROCEDURE — 250N000013 HC RX MED GY IP 250 OP 250 PS 637

## 2022-11-21 PROCEDURE — 99233 SBSQ HOSP IP/OBS HIGH 50: CPT

## 2022-11-21 PROCEDURE — 250N000011 HC RX IP 250 OP 636

## 2022-11-21 PROCEDURE — 214N000001 HC R&B CCU UMMC

## 2022-11-21 PROCEDURE — 80048 BASIC METABOLIC PNL TOTAL CA: CPT

## 2022-11-21 PROCEDURE — 83735 ASSAY OF MAGNESIUM: CPT | Performed by: INTERNAL MEDICINE

## 2022-11-21 PROCEDURE — 36415 COLL VENOUS BLD VENIPUNCTURE: CPT

## 2022-11-21 PROCEDURE — 99221 1ST HOSP IP/OBS SF/LOW 40: CPT | Performed by: NURSE PRACTITIONER

## 2022-11-21 PROCEDURE — 250N000012 HC RX MED GY IP 250 OP 636 PS 637

## 2022-11-21 PROCEDURE — 99207 PR SC NO CHARGE VISIT: CPT | Performed by: INTERNAL MEDICINE

## 2022-11-21 PROCEDURE — 250N000011 HC RX IP 250 OP 636: Performed by: INTERNAL MEDICINE

## 2022-11-21 RX ORDER — DAPAGLIFLOZIN 10 MG/1
10 TABLET, FILM COATED ORAL DAILY
Status: DISCONTINUED | OUTPATIENT
Start: 2022-11-21 | End: 2022-11-21 | Stop reason: CLARIF

## 2022-11-21 RX ORDER — MAGNESIUM SULFATE HEPTAHYDRATE 40 MG/ML
2 INJECTION, SOLUTION INTRAVENOUS ONCE
Status: COMPLETED | OUTPATIENT
Start: 2022-11-21 | End: 2022-11-21

## 2022-11-21 RX ADMIN — GABAPENTIN 100 MG: 100 CAPSULE ORAL at 11:06

## 2022-11-21 RX ADMIN — Medication 200 MG: at 19:53

## 2022-11-21 RX ADMIN — MAGNESIUM SULFATE HEPTAHYDRATE 2 G: 40 INJECTION, SOLUTION INTRAVENOUS at 12:34

## 2022-11-21 RX ADMIN — DIGOXIN 125 MCG: 125 TABLET ORAL at 07:50

## 2022-11-21 RX ADMIN — HYDROXYCHLOROQUINE SULFATE 200 MG: 200 TABLET, FILM COATED ORAL at 19:54

## 2022-11-21 RX ADMIN — ATENOLOL 50 MG: 25 TABLET ORAL at 07:50

## 2022-11-21 RX ADMIN — GABAPENTIN 100 MG: 100 CAPSULE ORAL at 21:38

## 2022-11-21 RX ADMIN — FUROSEMIDE 80 MG: 10 INJECTION, SOLUTION INTRAVENOUS at 07:50

## 2022-11-21 RX ADMIN — ATENOLOL 50 MG: 25 TABLET ORAL at 19:54

## 2022-11-21 RX ADMIN — GABAPENTIN 100 MG: 100 CAPSULE ORAL at 15:41

## 2022-11-21 RX ADMIN — EMPAGLIFLOZIN 10 MG: 10 TABLET, FILM COATED ORAL at 13:14

## 2022-11-21 RX ADMIN — SPIRONOLACTONE 50 MG: 25 TABLET ORAL at 07:51

## 2022-11-21 RX ADMIN — FUROSEMIDE 80 MG: 10 INJECTION, SOLUTION INTRAVENOUS at 15:40

## 2022-11-21 RX ADMIN — APIXABAN 5 MG: 5 TABLET, FILM COATED ORAL at 07:50

## 2022-11-21 RX ADMIN — GABAPENTIN 100 MG: 100 CAPSULE ORAL at 03:38

## 2022-11-21 RX ADMIN — Medication 3 MG: at 07:52

## 2022-11-21 RX ADMIN — APIXABAN 5 MG: 5 TABLET, FILM COATED ORAL at 19:54

## 2022-11-21 RX ADMIN — LORATADINE 10 MG: 10 TABLET ORAL at 07:51

## 2022-11-21 ASSESSMENT — ACTIVITIES OF DAILY LIVING (ADL)
ADLS_ACUITY_SCORE: 31
ADLS_ACUITY_SCORE: 27
ADLS_ACUITY_SCORE: 31
ADLS_ACUITY_SCORE: 27
ADLS_ACUITY_SCORE: 31
ADLS_ACUITY_SCORE: 31
DEPENDENT_IADLS:: INDEPENDENT

## 2022-11-21 NOTE — CONSULTS
Discharge Pharmacy Test Claim    Farxiga and jardiance are both covered with $0 copay through patient's [employer] plan.    Test Claim Copay   farxiga 0.00   jardiance 0.00       Kelly Angel  [facility] Pharmacy Liaison  Ph: [phone] Pager: [phone]   Securely message with the Vocera Web Console (learn more here)

## 2022-11-21 NOTE — CONSULTS
Electrophysiology Consultation Note   EP Attending: .   Reason for consultation: rhythm evaluation.   Provider requesting consultation: Ms. Gonzalez, CNP Cards I Service.  Date of Service: 11/21/2022      HPI:   Ms. Michel is a 61 year old female who has a past medical history significant for VSD s/p repair 1969, RA, HTN, insomnia, atrial tachyarrhythmias, cardiac arrest, SSS s/p PPM 12/2019, HFpEF, mod/severe TR, DLBCL, and exudative pericardial effusion.   She was admitted from 5/15/17-5/23/17 with atrial tachyarrhythmias (SVT, AF, AFL) with symptoms of palpitations, fatigue, and nausea. An echo showed LVEF 40-45%, mildly reduced RVEF, moderate biatrial enlargement, mild mitral regurgitation, and moderate tricuspid regurgitation. . She was started on Eliquis and underwent a BRE/DCCV on 5/17/17 c/b hypotension and recurrent arrhythmia.She was started on Sotalol and chemically cardioverted. However, she had nausea and thought it was from the Sotalol so this was stopped. She reverted back to AF/AFL and a rate control strategy was adopted which was difficult given symptoms so she started amiodarone. CMRI  showed LVEF 55%, mildly dilated RV with focal area of dyskinesis in RVOT, severe bi-atrial enlargement, severe TR, mild/moderate MR, and DE at RV septal insertion site. RFA was discussed but was defered as patient had a UTI at the time with ESBL.  She was readmitted on 6/19/17-8/4/17 after suffering an out of hospital cardiac arrest.  Upon EMS arrival, she was in VF. She was externally defibrillated and had ROSC in the field. She was intubated and brought to [facility] found to be in escape rhythm 43 bpm. She was taken emergently to cath lab where coronary angiogram showed normal coronary arteries. Temporary pacemaker was placed in RA given sinus arrest. She was also placed on therapeutic hypothermia. She had been having nausea, diarrhea, and intermittent vomiting over the last week and generally had not been  "feeling well over the last month and also had saddle anesthesia with lower extremity numbness that had been evaluated by neuro as an outpatient.  Ultimately found to have DLBCL started on chemo cycles. 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     Her DLBCL was treated with R-EPOCH for one cycle while in the hospital complicated by cytopenias followed by 5 cycles of R-CHOP finished the cycles in December of 2017 currently on surveillance scans. She had a pericardial effusion for which she is on colchicine which was discontinued at the end of 6/2018.   She has then followed intermittently with EP in clinic. She had some recurrent AF in 4/2018 requiring ED visit with DCCV with recurrence and another DCCV. We restarted digoxin 4/4/18 after which she had one episode of AF s/p DCCV and has since maintained sinus rhythm and reported good symptomatic control. She was seen in 5/2018 and reported having a feeling of her heart \"rolling\" daily lasting about about 10 sec at the most. The last time she required DCCV was in 4/12/2018. Her cMRI had shown some organization of her [ericardial effusion) and she has been initiated on colchicine. Chest CT in 9/2018 showed no evidence of lymphoma recurrence and improved pericardial effusion. Last EP follow up was in 2/2019 and she was doing well without issues.    She then presented 12/12/2019 with 1 day history palpitations, headaches, and nausea. She also endorses some dizziness when walking but not at rest. She was found to be bradycardic with intermittent junctional rhythm into 30's. Electrolytes and renal function stable. Her atenolol and digoxin were held. She continued to have junctional/bradycardia and decision was made to pursue PPM which was placed 12/13/2019. She has been in AF/AFL since 11/2020. She has been under a rate control strategy with atenolol and digoxin. "  TTE done 8/29/22 shows a normal EF, dilated IVC with mod to sev TR and some pulmonary HTN, severe biatrial enlargement. She reported some worsening HF symptoms at last EP visit in 8/29/22 and her lasix was increased and was referred to follow-up with [facility] [place]   She now presents on 11/19/22 to hospital with 8 lb weight gain, BLE edema, and MONTALVO. CXR showing likely pulmonary edema. NTBNP 1913. -120lbs admission weight 128 lbs. She reports she has been having increasing MONTALVO, abdominal bloating/fullness, fatigue, and weight gain since this Summer 2022. Echo here shows LVEF 60-65% with severe TR. Device interrogation shows normal device function, stable lead parameters, intrinsic AF, and  53.4%. On tele and ECGs here she is noted to have fairly frequent PVCs and short runs of NSVT. She feels palpitations/fluttering in her chest with these. Current cardiac medications include: Atenolol, Spironolactone, Digoxin, Eliquis, and Lasix.     Past Medical History:   Past Medical History:   Diagnosis Date    Arthritis     Cardiac arrest (H)     History of blood clots 2017    PICC line Right arm     History of chemotherapy     History of transfusion     Hypertension     Neuropathy     Physical deconditioning     Positive PPD, 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    VSD (ventricular septal defect)      Past Surgical History:   Past Surgical History:   Procedure Laterality Date    ANESTHESIA CARDIOVERSION N/A 5/17/2017    Procedure: ANESTHESIA CARDIOVERSION;  ANESTHESIA CARDIOVERSION;  Surgeon: GENERIC ANESTHESIA PROVIDER;  Location: [facility]    ANESTHESIA CARDIOVERSION  3/12/2018    Procedure: ANESTHESIA CARDIOVERSION;;  Surgeon: GENERIC ANESTHESIA PROVIDER;  Location: [facility]    ANESTHESIA CARDIOVERSION N/A 3/23/2018    Procedure: ANESTHESIA CARDIOVERSION;  Anesthesia Cardioversion ;  Surgeon: GENERIC ANESTHESIA PROVIDER;  Location: [facility]    ANESTHESIA CARDIOVERSION N/A 4/12/2018    Procedure: ANESTHESIA CARDIOVERSION;  Cardioversion;  Surgeon: GENERIC  ANESTHESIA PROVIDER;  Location: [facility] OR    ARTHRODESIS WRIST  2000    Right wrist    BONE MARROW ASPIRATE &BIOPSY  7/12/2017         BONE MARROW BIOPSY W/ ASPIRATION  07/12/2017    CARDIOVERSION      5/17/17, 3/12/18, 3/23/18, 4/14/18,     COLONOSCOPY N/A 11/19/2019    Procedure: Colonoscopy, With Polypectomy And Biopsy;  Surgeon: Pj Vasques MD;  Location: [facility]    EP PACEMAKER N/A 12/13/2019    Procedure: EP Pacemaker;  Surgeon: Pj Trent MD;  Location:  [facility] CATH LAB    EXCISE LESION UPPER EXTREMITY Left 5/22/2018    Procedure: EXCISE LESION UPPER EXTREMITY;  Left Arm Wound Excision And Closure, Possible Submuscular Transposition of Ulnar Nerve  (Choice Anesthesia);  Surgeon: Kevin Sheldon MD;  Location:  OR    FOOT SURGERY      4 left and 2 right    FOOT SURGERY      4 Left and 2 Right     IMPLANT PACEMAKER  12/13/2019    Dr China Trent   [facility]     IMPLANT PACEMAKER      RELEASE CARPAL TUNNEL Bilateral     RELEASE CARPAL TUNNEL BILATERAL      REPAIR VENTRICULAR SEPTAL DEFECT  1969    TRANSPOSITION ULNAR NERVE (ELBOW) Left 5/22/2018    Procedure: TRANSPOSITION ULNAR NERVE (ELBOW);;  Surgeon: Kevin Sheldon MD;  Location:  OR    ULNAR NERVE TRANSPOSITION Left 05/22/2018    VSD REPAIR  1969    ZZC FUSION FOOT BONES,SUBTALAR Right 10/20/2020    Procedure: RIGHT SUBTALAR JOINT FUSION AND CALCANEOCUBOID FUSION;  Surgeon: Nemesio Crow MD;  Location: [facility];  Service: Orthopedics     Allergies: Per MAR     Allergies   Allergen Reactions    Amiodarone Other (See Comments) and Difficulty breathing     Lethargic and had trouble breathing- occurred in 2017    Blood Transfusion Related (Informational Only) Hives     Hives with platelets. Give benadryl premedication.    Metoprolol Other (See Comments)     Pt and  report that metoprolol does not work for her and also reports feeling unwell with this medication. She has been able to tolerate atenolol,  which as worked in controlling her HR.     Other [No Clinical Screening - See Comments]      Penicillin Allergy Skin Test not performed, see antimicrobial management team progress note 7/5/17.      Penicillins     Tape [Adhesive Tape] Rash     Medications:   Per MAR current outpatient cardiovascular medications include:   Medications Prior to Admission   Medication Sig Dispense Refill Last Dose    acetaminophen (TYLENOL) 500 MG tablet Take 500 mg by mouth every 6 hours as needed for mild pain   Unknown at as needed    alendronate (FOSAMAX) 70 MG tablet TAKE 1 TABLET(70 MG) BY MOUTH EVERY 7 DAYS 12 tablet 3 Past Week at unknown    apixaban ANTICOAGULANT (ELIQUIS ANTICOAGULANT) 5 MG tablet Take 1 tablet (5 mg) by mouth 2 times daily 180 tablet 3 11/19/2022 at am    atenolol (TENORMIN) 50 MG tablet Take 1 tablet (50 mg) by mouth 2 times daily 60 tablet 3 11/19/2022 at am    calcium carbonate 600 mg-vitamin D 400 units (CALTRATE) 600-400 MG-UNIT per tablet Take 2 tablets by mouth daily       digoxin (LANOXIN) 125 MCG tablet Take 1 tablet (125 mcg) by mouth daily 90 tablet 3 11/19/2022 at am    furosemide (LASIX) 40 MG tablet Take 0.5 tablets (20 mg) by mouth daily 90 tablet 3 11/19/2022 at am    gabapentin (NEURONTIN) 100 MG capsule Take 1 capsule AM + 1 capsule afternoon + 2 capsules at bedtime 360 capsule 1 11/19/2022 at am    hydroxychloroquine (PLAQUENIL) 200 MG tablet Take 1 tablet (200 mg) by mouth daily 90 tablet 3 11/18/2022 at last night    loratadine (CLARITIN) 10 MG tablet Take 10 mg by mouth daily   11/19/2022 at am    magnesium oxide 200 MG TABS Take 200 mg by mouth daily bedtime       predniSONE (DELTASONE) 1 MG tablet Take 3 tablets (3 mg) by mouth daily 270 tablet 3 11/19/2022 at am    spironolactone (ALDACTONE) 25 MG tablet Take 2 tablets (50 mg) by mouth daily 90 tablet 3 11/19/2022 at am    multivitamin, therapeutic with minerals (THERA-VIT-M) TABS tablet Take 1 tablet by mouth daily 30 each 0      "STATIN NOT PRESCRIBED (INTENTIONAL) Please choose reason not prescribed, below (Patient not taking: Reported on 11/3/2022)        No current outpatient medications on file.     Current Facility-Administered Medications   Medication Dose Route Frequency    apixaban ANTICOAGULANT  5 mg Oral BID    atenolol  50 mg Oral BID    digoxin  125 mcg Oral Daily    furosemide  80 mg Intravenous BID    gabapentin  100 mg Oral Q6H    hydroxychloroquine  200 mg Oral Daily    loratadine  10 mg Oral Daily    magnesium oxide  200 mg Oral Daily    predniSONE  3 mg Oral Daily    sodium chloride (PF)  3 mL Intracatheter Q8H    spironolactone  50 mg Oral Daily     Family History:   Family History   Problem Relation Age of Onset    Breast Cancer Mother     Hypertension Mother     Alzheimer Disease Mother     Cerebrovascular Disease Mother     Hypertension Father     Cerebrovascular Disease Father     Atrial fibrillation Father     Lymphoma Father     Prostate Cancer Father     Diabetes Paternal Grandmother     Alzheimer Disease Maternal Grandmother         likely    Arthritis Maternal Grandfather     Pneumonia Maternal Grandfather      Social History:   Social History     Tobacco Use    Smoking status: Never    Smokeless tobacco: Never   Substance Use Topics    Alcohol use: Not Currently     Comment: none       ROS:   A comprehensive 10 point ROS was negative other than as mentioned in HPI.    Physical Examination:   VITALS: /74   Pulse 61   Temp 97.6  F (36.4  C) (Oral)   Resp 16   Ht 1.448 m (4' 9\")   Wt 55.5 kg (122 lb 4.8 oz)   SpO2 96%   BMI 26.47 kg/m    GENERAL APPEARANCE: AxO, NAD   HEENT: NCAT, EOMI, MMM. PERRLA.   NECK: Supple. JVP ~12 with large waves.   CHEST: CTAB   CARDIOVASCULAR: Irregular. II/IV systolic murmur.   ABDOMEN: BS+, soft, NT, ND.   EXTREMITIES: 2-3+ BLE edema to knees. Distal pulses intact.   NEURO: Grossly nonfocal.   PSYCH: Normal affect.  SKIN: Warm and dry.   Data:   Labs:  BMP  Recent Labs "   Lab 22  0602 22  1347 22  1345 22  1316   * 127* 125* 122*   POTASSIUM 4.1 4.7 4.9 8.3*   CHLORIDE 91*  --  89*  --    VIKTORIYA 9.3  --  9.8  --    CO2 16*  --  20*  --    BUN 34.2*  --  31.4*  --    CR 1.18*  --  1.19*  --    GLC 77 88 86 92     CBC  Recent Labs   Lab 22  0602 22  1347 22  1316 22  1314   WBC 5.6  --   --  5.9   RBC 4.02  --   --  4.52   HGB 14.4 18.0* 17.3 16.1   HCT 41.8 53 51 46.8   *  --   --  104*   MCH 35.8*  --   --  35.6*   MCHC 34.4  --   --  34.4   RDW 15.7*  --   --  15.5*   PLT 98*  --   --  106*     INR  Recent Labs   Lab 22  1314   INR 4.57*     No results found for: CKTOTAL, CKMB, TROPN  Cholesterol (mg/dL)   Date Value   10/15/2021 124   10/14/2020 129   10/24/2019 121   2018 96   2016 106     Cholesterol/HDL Ratio (no units)   Date Value   2011 3.0   2011 3.0     HDL Cholesterol (mg/dL)   Date Value   10/14/2020 48 (L)   10/24/2019 50   2018 35 (L)   2016 10 (L)     Direct Measure HDL (mg/dL)   Date Value   10/15/2021 50     LDL Cholesterol Calculated (mg/dL)   Date Value   10/15/2021 55   10/14/2020 54   10/24/2019 54   2018 37   2016 46     EK/20/22 Echo:  Interpretation Summary  Global and regional left ventricular function is normal with an EF of 55-60%.  Borderline right ventricular enlargement.  Global right ventricular function is normal.  Severe tricuspid insufficiency is present.  IVC diameter >2.1 cm collapsing <50% with sniff suggests a high RA pressure  estimated at 15 mmHg or greater.  This study was compared with the study from 22: No significant changes  noted. Specifically, TR is severe on both studies.  17 CMRI:     1. The LV is normal in cavity size and wall thickness. The global systolic function is normal. The LVEF is  64%. There are no regional wall motion abnormalities. No evidence of myocardial iron overload.   2. The RV is  normal in cavity size. The global systolic function is normal. The RVEF is 59%.   3. There is moderate dilation of bilateral atria.   4. Tricuspid regurgitation is present, difficult to quantify due to image quality.   5. Late gadolinium enhancement imaging shows RV insertion site hyperenhancement, a non-specific finding  seen in patients with RV volume/pressure overload.   6. There is a moderate right pleural effusion and small left pleural effusion.    CONCLUSIONS: Normal Bi-Ventricular systolic function, LVEF 64% and RVE 59%. There is no evidence of  infiltrative disease. Compared to the MRI from 5/15/2017, there is no significant change.   TTE 8/26/22:  Interpretation Summary  Left ventricular function, chamber size, wall motion, and thickness are normal.  Severe biatrial enlargement is present.  Moderate to severe tricuspid insufficiency is present.  Dilation of the inferior vena cava is present with abnormal respiratory  variation in diameter.  No pericardial effusion is present.  Assessment:   Ms. Michel is a 61 year old female who has a past medical history significant for VSD s/p repair 1969, RA, HTN, insomnia, atrial tachyarrhythmias, cardiac arrest, SSS s/p PPM 12/2019, HFpEF, mod/severe TR, DLBCL, and exudative pericardial effusion. Now presenting with clinical right sided HF, severe TR, and hypervolemia. Noted to have fairly frequent PVCs and NSVT while in her permanent AF/AFL.    EP Recommendations:  Sick Sinus Syndrome:   Atrial Fibrillation  PVC/NSVT:  1. Stroke Prophylaxis:  CHADSVASC=+HTN, +gender  2, corresponding to a 2.2% annual stroke / systemic emolism event rate. indicating need for long term oral anticoagulation.  She is on Eliquis. No bleeding issues.   2. Rate Control: Continue Atenolol. Would recommend stopping Digoxin in case contributing to arrhyhmias.   3. Rhythm Control: Cardioversion, Antiarrhythmics and/or ablation are options for rhythm control. She has had several DCCV last in  4/12/19. Notably, she had possible side effects from Sotalol (however, likely was due to underlying illness at the time and not from medication since she was found to have DBCL).  She is currently in chronic AFL/AF but is asymptomatic and LVEF preserved.    4. Had tachy-shelton syndrome and underwent PPM implant in 12/2019. Normal device function with stable lead parameters.   5. Limited AAT options given intolerance to sotalol and amiodarone. Likely will improve with volume management. Can consider ablation if continues to have frequent ventricular runs/ectopy with symptoms despite HF compensation. We will follow up with her as an outpatient in clinic for this. No acute interventions now.     The patient states understanding and is agreeable with plan.   Thank you for allowing us to participate in the care of this patient.     The patient was discussed w/ Dr. Eaton.  The above note reflects our joint plan.    ELIZABETH Verde CNP  Electrophysiology Consult Service  Pager: [phone]    KEVIN Total time spent on patient visit, reviewing notes, imaging, labs, orders, and completing necessary documentation: 35 minutes.  >50% of visit spent on counseling patient and/or coordination of care.    EP STAFF NOTE  I have seen and examined the patient as part of a shared visit with GERA Verde NP.  I agree with the note above. I reviewed today's vital signs and medications. I have reviewed and discussed with the advanced practice provider their physical examination, assessment, and plan   Briefly, chronic Afib, HFpEF, PVCs and NSVT  My key history/exam findings are: AF.   The key management decisions made by me: stop dogoxin,avoid AAT, consider ablation if recurrences or worsening.  Total time spent on patient visit, reviewing notes, imaging, labs, orders, and completing necessary documentation: 35 minutes.  >50% of visit spent on counseling patient and/or coordination of care.     Juan Eaton MD [facility]  Cardiology -  Electrophysiology

## 2022-11-21 NOTE — PROGRESS NOTES
[facility]   Cardiology   Progress Note     ASSESSMENT/PLAN:  Amelia Michel is a 61 year old female with a PMH significant for HTN, chronic diastolic heart failure, atrial tacchyarrhythmias (on Eliquis), cardiac arrest (2017), SSS s/p PPM (12/2019), RA, and DLBCL who is admitted for heart failure exacerbation.     Interval History: No acute events overnight. Patient reports improvement in shortness of breath. Remains on room air, lungs clear. Plan to start Farxiga today; $0 cost for patient. Surgery was discussed with patient via attending MD and she is agreeable to proceeding with work up; orders placed. Plan for BRE tomorrow to better evaluated tricuspid valve.     Changes Today:  - EP to see today  - BRE tomorrow and CVTS consult for TR   - Start dapagliflozin 10mg daily     # Acute on Chronic Diastolic Heart Failure (EF 50%) NYHA Class III, AHA/ACC Stage C  # Severe Tricuspid Regurgitation   # Hypertension  Patient reporting MONTALVO, BLE edema, and 8 pound weight gain in the last week. She has been taking her home Lasix 20mg daily as prescribed. Chest X-ray suspicious for pulmonary edema. NT-P BNP 1,913. Troponin T 21-->12. No chest pain reported. EDW per chart review 116-120; admission weight 128. Denies fever, cough, PND, or orthopnea. She admits symptoms are similar to previous CHF exacerbations she has had in the past. She follows in our [facility] clinic.    Per Dr. Andrea's read of TTE 8/2022 and by clinical exam, TR is clearly severe and accounts in part for predominant presentation of clinical right HF. Echocardiogram with severe TR and EF   - Fluid Status: Hypervolemic; s/p IV Lasix 10mg in ED. IV Lasix 80mg BID.   - Should discharge on PO Torsemide for severe TR  - Pharmacy liaison consult for SGLT2 cost   - Continue PTA Spironolactone 50mg daily  - Continue PTA Atenolol 50mg BID  - Daily standing weights  - Strict I&O's  - Low Na Diet / 2L Fluid restriction  - Daily BMP;  "Keep K+ > 4.0, Mg > 2.0. Protocols ordered.   - BRE tomorrow  - CVTS consult      # SSS s/p PPM (2019)  # Atrial Tachyarrhythmias  Cardiac history dates back to May 2017 with atrial tachyarrhythmias and EF of 40-45% at that time. Patient feels as if her pacemaker has been \"firing more\" than her baseline. Device check report showing 11,136 episodes of \"NSVT\" since 9/25/22 although EGM's reveal irregular v-v intervals with similar morphology to intrinsic rhythm indicating probable AF w/ RVR. [id number] = 2 (+ htn, + gender). Aberrancy vs PVC's?  - Telemetry  - EP consult today for rhythm clarification/recommendations   - PRN EKG's   - Continue PTA Digoxin 125mcg   - Continue PTA Eliquis 5mg BID   - Continue PTA Atenolol 50mg BID      # Chronic Hyponatremia  Chronically low sodium since April of 2022. Could be related to hypervolemic state.   - Diuresis per above  - Daily BMP      # Rheumatoid Arthritis  - Continue PTA Prednisone 3mg daily  - Continue PTA Hydroxychloroquine 200mg daily      # Hx of Diffuse Large B-Cell Lymphoma  # Hx of Cardiac Arrest (2017)  Underwent hospitalization 6/2017 due to VF arrest in setting of normal angiogram and found to have DLBCL complicated by masses on the coronary cusps and LVOT. She underwent R-EPOCH one cycle and 5 subsequent cycles of R-CHOP completed in 1220/17 with her course complicated by pericardial effusion treated with colcichine.   - CBC every 3 days     FEN: 2g Na Diet / 2L Fluid Restriction; NPO at MN   Code Status: Full Code  Prophylaxis:  PTA Eliquis  Isolation: Contact  Disposition: 2-5 days pending work up      Patient seen and discussed with Dr. Reyes, who agrees with above plan.    Gema JOHNSON, CNP  [facility] Cardiology Team    Physical Exam:  Temp:  [97.2  F (36.2  C)-98.5  F (36.9  C)] 97.2  F (36.2  C)  Pulse:  [] 107  Resp:  [16-18] 16  BP: (101-121)/(61-96) 113/71  Cuff Mean (mmHg):  [103-110] 103  SpO2:  [96 %-100 %] 100 %    I/O:   Intake/Output " Summary (Last 24 hours) at 11/21/2022 0942  Last data filed at 11/21/2022 0800  Gross per 24 hour   Intake 630 ml   Output 2850 ml   Net -2220 ml       Wt:   Wt Readings from Last 5 Encounters:   11/21/22 55.5 kg (122 lb 4.8 oz)   11/03/22 55.8 kg (123 lb)   09/16/22 54.7 kg (120 lb 8 oz)   09/14/22 54.9 kg (121 lb)   08/26/22 55.5 kg (122 lb 6.4 oz)     General: NAD  HEENT:  PERRLA, EOMI.   Neck: JVD 1/3 way up neck.   CV: RRR. No murmur appreciated. No rubs or gallops. Peripheral radial pulse intact.  Resp: No increased work of breathing or use of accessory muscles, breathing comfortably on room air.  Lung sounds clear throughout/bilaterally  Abdomen:  Normal active bowel sounds.  Abdomen is soft. No distension, non-tender to palpation.    Extremities: Warm. Capillary refill less than 3 sec. 3/4 radial pulses bilaterally.  3/4 pedal pulses bilaterally. BLE pre-tibial edema. No cyanosis or clubbing.  Skin:  Warm and dry. No erythema, rashes, ulceration or diaphoresis.  Neuro: Alert and oriented x3.        Medications:    apixaban ANTICOAGULANT  5 mg Oral BID     atenolol  50 mg Oral BID     digoxin  125 mcg Oral Daily     furosemide  80 mg Intravenous BID     gabapentin  100 mg Oral Q6H     hydroxychloroquine  200 mg Oral Daily     loratadine  10 mg Oral Daily     magnesium oxide  200 mg Oral Daily     magnesium sulfate  2 g Intravenous Once     predniSONE  3 mg Oral Daily     sodium chloride (PF)  3 mL Intracatheter Q8H     spironolactone  50 mg Oral Daily       - MEDICATION INSTRUCTIONS -       - MEDICATION INSTRUCTIONS -         Labs:   CMP  Recent Labs   Lab 11/21/22  0748 11/20/22  0602 11/19/22  1347 11/19/22  1345   * 126* 127* 125*   POTASSIUM 3.9 4.1 4.7 4.9   CHLORIDE 93* 91*  --  89*   CO2 23 16*  --  20*   ANIONGAP 13 19*  --  16*   GLC 70 77 88 86   BUN 30.0* 34.2*  --  31.4*   CR 1.21* 1.18*  --  1.19*   GFRESTIMATED 51* 52*  --  52*   VIKTORIYA 9.0 9.3  --  9.8   MAG 1.9 1.9  --  2.1   PROTTOTAL  --    --   --  7.6   ALBUMIN  --   --   --  4.3   BILITOTAL  --   --   --  2.8*   ALKPHOS  --   --   --  125   AST  --   --   --  125*   ALT  --   --   --  87*     CBC  Recent Labs   Lab 22  0602 22  1347 22  1316 22  1314   WBC 5.6  --   --  5.9   RBC 4.02  --   --  4.52   HGB 14.4 18.0* 17.3 16.1   HCT 41.8 53 51 46.8   *  --   --  104*   MCH 35.8*  --   --  35.6*   MCHC 34.4  --   --  34.4   RDW 15.7*  --   --  15.5*   PLT 98*  --   --  106*     INR  Recent Labs   Lab 22  1314   INR 4.57*     Arterial Blood GasNo lab results found in last 7 days.    Diagnostics:  22 EC/20/22 Echocardiogram:  Global and regional left ventricular function is normal with an EF of 55-60%.  Borderline right ventricular enlargement.  Global right ventricular function is normal.  Severe tricuspid insufficiency is present.  IVC diameter >2.1 cm collapsing <50% with sniff suggests a high RA pressure  estimated at 15 mmHg or greater.  This study was compared with the study from 22: No significant changes  noted. Specifically, TR is severe on both studies.    Recent Results (from the past 24 hour(s))   Echo Complete   Result Value    LVEF  55-60%    [facility]    [id number]  [id number]  [id number]  [id number]^TYRA^CONCEPCION^EMELYN     [facility]  Echocardiography Laboratory  [address] [place] [id number]     Name: FIDELIA MIDDLETON  MRN: [id number]  : 1960  Study Date: 2022 08:15 AM  Age: 61 yrs  Gender: Female  [place] [facility] [place]  Reason For Study: Heart Failure  Ordering Physician: CONCEPCION MARY  Performed By: Michelle Huizar     BSA: 1.5 m2  Height: 57 in  Weight: 128 lb  BP: 133/114 mmHg  ______________________________________________________________________________  Procedure  Limited Portable Echo Adult. Technically difficult  study.  ______________________________________________________________________________  Interpretation Summary  Global and regional left ventricular function is normal with an EF of 55-60%.  Borderline right ventricular enlargement.  Global right ventricular function is normal.  Severe tricuspid insufficiency is present.  IVC diameter >2.1 cm collapsing <50% with sniff suggests a high RA pressure  estimated at 15 mmHg or greater.  This study was compared with the study from 8/26/22: No significant changes  noted. Specifically, TR is severe on both studies.     ______________________________________________________________________________  Left Ventricle  Global and regional left ventricular function is normal with an EF of 55-60%.  Left ventricular size is normal. Left ventricular wall thickness cannot  evaluate. Diastolic function not assessed due to atrial fibrillation.  Flattened septum is consistent with right ventricular volume overload.     Right Ventricle  Global right ventricular function is normal. Right ventricular wall thickness  is normal. Borderline right ventricular enlargement. A pacemaker lead is noted  in the right ventricle.     Atria  Severe biatrial enlargement is present.     Mitral Valve  Mild mitral insufficiency is present.     Aortic Valve  Mild aortic valve calcification is present. Trace aortic insufficiency is  present.     Tricuspid Valve  Severe tricuspid insufficiency is present. PA pressure cannot be accurately  assessed due to severe TR.     Pulmonic Valve  Trace pulmonic insufficiency is present.     Vessels  The aorta root is normal. The thoracic aorta is normal. Dilation of the  inferior vena cava is present with abnormal respiratory variation in diameter.  IVC diameter >2.1 cm collapsing <50% with sniff suggests a high RA pressure  estimated at 15 mmHg or greater. Hepatic vein flow consistent with severe  tricuspid insufficiency is present.     Pericardium  No pericardial  effusion is present.     Compared to Previous Study  This study was compared with the study from 8/26/22 . No significant changes  noted.  ______________________________________________________________________________  MMode/2D Measurements & Calculations  asc Aorta Diam: 3.1 cm     ______________________________________________________________________________  Report approved by: Milly Ca 11/20/2022 11:15 AM             Time Spent on this Encounter   I spent 35 minutes on the unit/floor managing the care of Amelia DUNAWAY Marilu. Over 50% of my time was spent on the following:   - Counseling the patient and/or family regarding: diagnostic results, prognosis and risks and benefits of treatment options  - Coordination of care with the: consultant(s)    ELIZABETH Izquierdo CNP

## 2022-11-21 NOTE — PLAN OF CARE
D AVSS with sat's 96% on room air. Heart irregular Afib with runs of aberrant Afib and lungs slightly decreased in bases otherwise clear. Voiced no c/o pain or nausea overnight and slept well between cares. Up to bedside commode independently to void good amounts of clear anselmo urine and weight this AM is down over 2 kgs.   I Vital's, assessment and med's per order.   A Resting in bed with call light in reach.   P Continue to monitor and update MD with changes.

## 2022-11-21 NOTE — CONSULTS
Cardiothoracic Surgery Consult Note    Consult Reason: Severe TR  _____________________________________________________________________  A/P: Patient is a 61 year old adult with severe tricuspid valve regurgitation. CVTS was consulted for consideration of surgical intervention. Remote Hx VSD repair via sternotomy per patient.    - Awaiting BRE results, will d/w surgeon.  - Last coronary angiogram was in May of 2017, would need gated coronary CT vs coronary angiogram  - Will need dental clearance prior to valve replacement  - Recommend carotid ultrasound  - Other cares per primary Cardiology team  - Thank you for the opportunity to participate in the care of this patient.    Discussed with Dr Cope.     Tanner Chicas PA-C  Cardiothoracic Surgery  Pager [phone]    3:25 PM   November 21, 2022    *no STS scoring available for isolated TVR  ______________________________________________________________________________  HPI:   Amelia Michel is a 61 year old woman with a PMH significant for HTN, chronic diastolic heart failure, atrial tachyarrhythmias anticoagulated with Eliquis, cardiac arrest in 2017, SSS s/p PPM placement in 12/2019, RA and diffuse large B-cell lymphoma. She was admitted for heart failure exacerbation with an 8 lb weight gain and associated MONTALVO, BLE edema and pulmonary edema noted on CXR. Echo this past summer demonstrated LVEF 40-45%, severe tricuspid valve regurgitation. Echocardiogram on 11/20 with LVEF 55-60%, preserved RV function, and severe tricuspid insufficiency. Despite optimizing medical regimen, she continues to be severely symptomatic. CVTS was consulted for consideration of surgical intervention. BRE is planned for Tuesday, 11/22/22.     Pertinent Positives: MONTALVO, BLE edema, 8 lb weight gain in one week, pulmonary edema  Pertinent Negatives: Fever, cough, PND, orthopnea    PMH:  Past Medical History:   Diagnosis Date     Arthritis      Cardiac arrest (H)      History of blood clots  2017    PICC line Right arm      History of chemotherapy      History of transfusion      Hypertension      Neuropathy      Physical deconditioning      Positive PPD, 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     VSD (ventricular septal defect)      PSH:  Past Surgical History:   Procedure Laterality Date     ANESTHESIA CARDIOVERSION N/A 5/17/2017    Procedure: ANESTHESIA CARDIOVERSION;  ANESTHESIA CARDIOVERSION;  Surgeon: GENERIC ANESTHESIA PROVIDER;  Location: [facility]     ANESTHESIA CARDIOVERSION  3/12/2018    Procedure: ANESTHESIA CARDIOVERSION;;  Surgeon: GENERIC ANESTHESIA PROVIDER;  Location: [facility]     ANESTHESIA CARDIOVERSION N/A 3/23/2018    Procedure: ANESTHESIA CARDIOVERSION;  Anesthesia Cardioversion ;  Surgeon: GENERIC ANESTHESIA PROVIDER;  Location: [facility]     ANESTHESIA CARDIOVERSION N/A 4/12/2018    Procedure: ANESTHESIA CARDIOVERSION;  Cardioversion;  Surgeon: GENERIC ANESTHESIA PROVIDER;  Location: [facility]     ARTHRODESIS WRIST  2000    Right wrist     BONE MARROW ASPIRATE &BIOPSY  7/12/2017          BONE MARROW BIOPSY W/ ASPIRATION  07/12/2017     CARDIOVERSION      5/17/17, 3/12/18, 3/23/18, 4/14/18,      COLONOSCOPY N/A 11/19/2019    Procedure: Colonoscopy, With Polypectomy And Biopsy;  Surgeon: Pj Vasques MD;  Location: [facility]     EP PACEMAKER N/A 12/13/2019    Procedure: EP Pacemaker;  Surgeon: Pj Trent MD;  Location:  [facility] CATH LAB     EXCISE LESION UPPER EXTREMITY Left 5/22/2018    Procedure: EXCISE LESION UPPER EXTREMITY;  Left Arm Wound Excision And Closure, Possible Submuscular Transposition of Ulnar Nerve  (Choice Anesthesia);  Surgeon: Kevin Sheldon MD;  Location:  OR     FOOT SURGERY      4 left and 2 right     FOOT SURGERY      4 Left and 2 Right      IMPLANT PACEMAKER  12/13/2019    Dr China Trent   [facility]      IMPLANT PACEMAKER       RELEASE CARPAL TUNNEL Bilateral      RELEASE CARPAL TUNNEL BILATERAL       REPAIR VENTRICULAR SEPTAL DEFECT  1969     TRANSPOSITION ULNAR  NERVE (ELBOW) Left 5/22/2018    Procedure: TRANSPOSITION ULNAR NERVE (ELBOW);;  Surgeon: Kevin Sheldon MD;  Location: [facility]     ULNAR NERVE TRANSPOSITION Left 05/22/2018     VSD REPAIR  1969     ZZC FUSION FOOT BONES,SUBTALAR Right 10/20/2020    Procedure: RIGHT SUBTALAR JOINT FUSION AND CALCANEOCUBOID FUSION;  Surgeon: Nemesio Crow MD;  Location: [facility];  Service: Orthopedics     Past Surgical History:   Procedure Laterality Date     ANESTHESIA CARDIOVERSION N/A 5/17/2017    Procedure: ANESTHESIA CARDIOVERSION;  ANESTHESIA CARDIOVERSION;  Surgeon: GENERIC ANESTHESIA PROVIDER;  Location: [facility]     ANESTHESIA CARDIOVERSION  3/12/2018    Procedure: ANESTHESIA CARDIOVERSION;;  Surgeon: GENERIC ANESTHESIA PROVIDER;  [facility]: [facility]     ANESTHESIA CARDIOVERSION N/A 3/23/2018    Procedure: ANESTHESIA CARDIOVERSION;  Anesthesia Cardioversion ;  Surgeon: GENERIC ANESTHESIA PROVIDER;  [facility]: [facility]     ANESTHESIA CARDIOVERSION N/A 4/12/2018    Procedure: ANESTHESIA CARDIOVERSION;  Cardioversion;  Surgeon: GENERIC ANESTHESIA PROVIDER;  Location: [facility]     ARTHRODESIS WRIST  2000    Right wrist     BONE MARROW ASPIRATE &BIOPSY  7/12/2017          BONE MARROW BIOPSY W/ ASPIRATION  07/12/2017     CARDIOVERSION      5/17/17, 3/12/18, 3/23/18, 4/14/18,      COLONOSCOPY N/A 11/19/2019    Procedure: Colonoscopy, With Polypectomy And Biopsy;  Surgeon: Pj Vasques MD;  Location: [facility]     EP PACEMAKER N/A 12/13/2019    Procedure: EP Pacemaker;  Surgeon: Pj Trent MD;  Location:  HE[facility] CARD[facility] CATH LAB     EXCISE LESION UPPER EXTREMITY Left 5/22/2018    Procedure: EXCISE LESION UPPER EXTREMITY;  Left Arm Wound Excision And Closure, Possible Submuscular Transposition of Ulnar Nerve  (Choice Anesthesia);  Surgeon: Kevin Sheldon MD;  Location: [facility]     FOOT SURGERY      4 left and 2 right     FOOT SURGERY      4 Left and 2 Right      IMPLANT PACEMAKER  12/13/2019    Dr China Trent  [facility] Card[facility]  Cath lab      IMPLANT PACEMAKER       RELEASE CARPAL TUNNEL Bilateral      RELEASE CARPAL TUNNEL BILATERAL       REPAIR VENTRICULAR SEPTAL DEFECT  1969     TRANSPOSITION ULNAR NERVE (ELBOW) Left 5/22/2018    Procedure: TRANSPOSITION ULNAR NERVE (ELBOW);;  Surgeon: Kevin Sheldon MD;  Location: [facility]     ULNAR NERVE TRANSPOSITION Left 05/22/2018     VSD REPAIR  1969     ZZC FUSION FOOT BONES,SUBTALAR Right 10/20/2020    Procedure: RIGHT SUBTALAR JOINT FUSION AND CALCANEOCUBOID FUSION;  Surgeon: Nemesio Crow MD;  Location: [facility];  Service: Orthopedics     FH:  Family History   Problem Relation Age of Onset     Breast Cancer Mother      Hypertension Mother      Alzheimer Disease Mother      Cerebrovascular Disease Mother      Hypertension Father      Cerebrovascular Disease Father      Atrial fibrillation Father      Lymphoma Father      Prostate Cancer Father      Diabetes Paternal Grandmother      Alzheimer Disease Maternal Grandmother         likely     Arthritis Maternal Grandfather      Pneumonia Maternal Grandfather        SH:  Social History     Socioeconomic History     Marital status:      Spouse name: Romeo Michel     Number of children: 0     Years of education: None     Highest education level: None   Occupational History     Employer: [employer]   Tobacco Use     Smoking status: Never     Smokeless tobacco: Never   Substance and Sexual Activity     Alcohol use: Not Currently     Comment: none     Drug use: No   Other Topics Concern     Parent/sibling w/ CABG, MI or angioplasty before 65F 55M? No     Social Determinants of Health     Intimate Partner Violence: Not At Risk     Fear of Current or Ex-Partner: No     Emotionally Abused: No     Physically Abused: No     Sexually Abused: No       Home Meds:  Medications Prior to Admission   Medication Sig Dispense Refill Last Dose     acetaminophen (TYLENOL) 500 MG tablet Take 500 mg by mouth every 6 hours as  needed for mild pain   Unknown at as needed     alendronate (FOSAMAX) 70 MG tablet TAKE 1 TABLET(70 MG) BY MOUTH EVERY 7 DAYS 12 tablet 3 Past Week at unknown     apixaban ANTICOAGULANT (ELIQUIS ANTICOAGULANT) 5 MG tablet Take 1 tablet (5 mg) by mouth 2 times daily 180 tablet 3 11/19/2022 at am     atenolol (TENORMIN) 50 MG tablet Take 1 tablet (50 mg) by mouth 2 times daily 60 tablet 3 11/19/2022 at am     calcium carbonate 600 mg-vitamin D 400 units (CALTRATE) 600-400 MG-UNIT per tablet Take 2 tablets by mouth daily        digoxin (LANOXIN) 125 MCG tablet Take 1 tablet (125 mcg) by mouth daily 90 tablet 3 11/19/2022 at am     furosemide (LASIX) 40 MG tablet Take 0.5 tablets (20 mg) by mouth daily 90 tablet 3 11/19/2022 at am     gabapentin (NEURONTIN) 100 MG capsule Take 1 capsule AM + 1 capsule afternoon + 2 capsules at bedtime 360 capsule 1 11/19/2022 at am     hydroxychloroquine (PLAQUENIL) 200 MG tablet Take 1 tablet (200 mg) by mouth daily 90 tablet 3 11/18/2022 at last night     loratadine (CLARITIN) 10 MG tablet Take 10 mg by mouth daily   11/19/2022 at am     magnesium oxide 200 MG TABS Take 200 mg by mouth daily bedtime        predniSONE (DELTASONE) 1 MG tablet Take 3 tablets (3 mg) by mouth daily 270 tablet 3 11/19/2022 at am     spironolactone (ALDACTONE) 25 MG tablet Take 2 tablets (50 mg) by mouth daily 90 tablet 3 11/19/2022 at am     multivitamin, therapeutic with minerals (THERA-VIT-M) TABS tablet Take 1 tablet by mouth daily 30 each 0      STATIN NOT PRESCRIBED (INTENTIONAL) Please choose reason not prescribed, below (Patient not taking: Reported on 11/3/2022)          Allergies:  Allergies   Allergen Reactions     Amiodarone Other (See Comments) and Difficulty breathing     Lethargic and had trouble breathing- occurred in 2017     Blood Transfusion Related (Informational Only) Hives     Hives with platelets. Give benadryl premedication.     Metoprolol Other (See Comments)     Pt and   report that metoprolol does not work for her and also reports feeling unwell with this medication. She has been able to tolerate atenolol, which as worked in controlling her HR.      Other [No Clinical Screening - See Comments]      Penicillin Allergy Skin Test not performed, see antimicrobial management team progress note 7/5/17.       Penicillins      Tape [Adhesive Tape] Rash       ROS:  ROS: A complete 10 point ROS neg other than the symptoms noted above in the HPI.    Physical Exam:  Temp:  [97.2  F (36.2  C)-98.5  F (36.9  C)] 97.9  F (36.6  C)  Pulse:  [] 65  Resp:  [16] 16  BP: ()/(61-96) 98/70  Cuff Mean (mmHg):  [] 78  SpO2:  [95 %-100 %] 96 %    Gen: NAD, resting comfortably in bed, conversational.  Skin: well healed sternotomy scar. Dry and warm skin.  HEENT: normocephalic, atraumatic cranium, EOMI, sclerae anicteric. Oral mucosa pink and moist, no tonsillar edema or erythema  Lungs: CTA in all fields, no wheezing or rhonchi  CV: RRR, S1S2 normal, no murmur. Radial pulses and DP pulses symmetric. No dependent edema but wearing HARRY stockings.  Abd: no scars, positive normal pitched bowel sounds, overall soft with mild distention, nontender, no masses/guarding/rebound tenderness.   Musculoskeletal: grossly intact, strength 5/5 upper and lower extremities  Neuro: AOx3, CN II-VII grossly intact, sensation/motor intact in upper and lower extremities  Mental: normal mood and affect, regular rate of speech    Labs:  ABG No lab results found in last 7 days.  CBC  Recent Labs   Lab 11/20/22  0602 11/19/22  1347 11/19/22  1316 11/19/22  1314   WBC 5.6  --   --  5.9   HGB 14.4 18.0* 17.3 16.1   PLT 98*  --   --  106*     BMP  Recent Labs   Lab 11/21/22  0748 11/20/22  0602 11/19/22  1347 11/19/22  1345   * 126* 127* 125*   POTASSIUM 3.9 4.1 4.7 4.9   CHLORIDE 93* 91*  --  89*   CO2 23 16*  --  20*   BUN 30.0* 34.2*  --  31.4*   CR 1.21* 1.18*  --  1.19*   GLC 70 77 88 86     LFT  Recent Labs    Lab 11/19/22  1345 11/19/22  1314   *  --    ALT 87*  --    ALKPHOS 125  --    BILITOTAL 2.8*  --    ALBUMIN 4.3  --    INR  --  4.57*     PancreasNo lab results found in last 7 days.    Imaging:  Echocardiogram 11/20/22:  Interpretation Summary  Global and regional left ventricular function is normal with an EF of 55-60%.  Borderline right ventricular enlargement.  Global right ventricular function is normal.  Severe tricuspid insufficiency is present.  IVC diameter >2.1 cm collapsing <50% with sniff suggests a high RA pressure  estimated at 15 mmHg or greater.  This study was compared with the study from 8/26/22: No significant changes  noted. Specifically, TR is severe on both studies.    11/19/2022 CXR:  IMPRESSION:   Cardiomegaly with engorged pulmonary vasculature, with mild perihilar  and bibasilar interstitial prominence, suspicious for pulmonary edema.  No consolidation.    10:52 AM  November 21, 2022

## 2022-11-21 NOTE — PROGRESS NOTES
"SPIRITUAL HEALTH SERVICES  SPIRITUAL ASSESSMENT Progress Note  [facility] ([place]) 6C     REFERRAL SOURCE: Length of Stay     Pt and  were present at time of visit.   introduced role of SHS and spoke with pt/ about how they are coping with pt's medical challenges.     Pt named that she has had many health issues over the last several years, and is waiting to talk to the doctor today about next steps.  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  She also said she is \"not used to sitting at home\" but gets quite fatigued and is not able to enjoy the activities she once did.  Her  said that her \"quality of life has not been good the last few months especially.\"       has several stressors he is managing in addition to his wife's care, including caring for his mother who is ill and managing work and his own health issues.  He said that he is trying to delegate his mother's care to his brother so he can focus on his wife.  He named that they are not able to do things together that they once did, like bike riding, because pt is too tired and the exertion even to get on the bike itself is too much.     Pt and  are \"[employer] shopping\" but  is Buddhist and pt was raised Shinto.  Both find meaning in family and friends.       provided active listening and support, as well as validation of concerns and exploration of coping strategies.   encouraged  to delegate what he could and to make sure he takes care of himself as well as his family.    Physician came in to speak to pt/ so  left, but let pt and family know that SHS would continue to be available for support and encouraged them to reach out as needed.     PLAN: SHS will remain available     Gala Rosales  Pager: [phone]    "

## 2022-11-21 NOTE — PROGRESS NOTES
D- Afib with runs of wide complex aberrant Afib. EP and CVTS consulted  I- Mag replaced per protocol   A- Stable- diuresis going well.   P- NPO after MN for BER

## 2022-11-21 NOTE — PROGRESS NOTES
D/she said she is tired today. She reported that she asked cards MD if she could get up and walk in the [place]. She says he said not to do that for now.   A/we talked about her rhythm and how many episodes she had on device interview in the ER. I talked about what her rhythm is doing. She says when she gets really long runs she can feel a thumping in her chest and a fullness in the back of her throat. I talked about how poorly the heart pumps with A fib,and why this is, and that her heart is slow A fib and then runs of 160, so her heart keeps going too fast and then really slow. She continues to have almost continuous runs of 2-7 beats wide complex aberrant A fib alternating with a beat or two of Afib rate 50-60. I explained that pacers often don't know what to do with A fib and so we hardly see any pacer spikes, but the overall rhythm with slow A fib mixed with frequent runs gives her a rate usually at least 70's  We talked about EP seeing her. She is very pleased that Dr Eaton will be seeing her, as she was pleased with his care of her after she arrested and had problems with A fib. She ate well and is watching TV from bed and gets up to the bedside commode  A/worn out today  P/monitor for changes, await EP consult and recommendations

## 2022-11-22 ENCOUNTER — APPOINTMENT (OUTPATIENT)
Dept: ULTRASOUND IMAGING | Facility: CLINIC | Age: 62
DRG: 273 | End: 2022-11-22
Payer: COMMERCIAL

## 2022-11-22 ENCOUNTER — APPOINTMENT (OUTPATIENT)
Dept: CARDIOLOGY | Facility: CLINIC | Age: 62
DRG: 273 | End: 2022-11-22
Payer: COMMERCIAL

## 2022-11-22 PROBLEM — I50.33 ACUTE ON CHRONIC DIASTOLIC HEART FAILURE (H): Status: ACTIVE | Noted: 2022-11-19

## 2022-11-22 LAB
ANION GAP SERPL CALCULATED.3IONS-SCNC: 15 MMOL/L (ref 7–15)
ATRIAL RATE - MUSE: 0 BPM
ATRIAL RATE - MUSE: 93 BPM
BUN SERPL-MCNC: 28.9 MG/DL (ref 8–23)
CALCIUM SERPL-MCNC: 9.1 MG/DL (ref 8.8–10.2)
CHLORIDE SERPL-SCNC: 93 MMOL/L (ref 98–107)
CREAT SERPL-MCNC: 1.32 MG/DL (ref 0.51–1.17)
DEPRECATED HCO3 PLAS-SCNC: 23 MMOL/L (ref 22–29)
DIASTOLIC BLOOD PRESSURE - MUSE: NORMAL MMHG
DIASTOLIC BLOOD PRESSURE - MUSE: NORMAL MMHG
GFR SERPL CREATININE-BSD FRML MDRD: 46 ML/MIN/1.73M2
GLUCOSE SERPL-MCNC: 91 MG/DL (ref 70–99)
HOLD SPECIMEN: NORMAL
INTERPRETATION ECG - MUSE: NORMAL
INTERPRETATION ECG - MUSE: NORMAL
LVEF ECHO: NORMAL
MAGNESIUM SERPL-MCNC: 2.4 MG/DL (ref 1.7–2.3)
P AXIS - MUSE: NORMAL DEGREES
P AXIS - MUSE: NORMAL DEGREES
POTASSIUM SERPL-SCNC: 4 MMOL/L (ref 3.4–5.3)
PR INTERVAL - MUSE: NORMAL MS
PR INTERVAL - MUSE: NORMAL MS
QRS DURATION - MUSE: 144 MS
QRS DURATION - MUSE: 86 MS
QT - MUSE: 382 MS
QT - MUSE: 412 MS
QTC - MUSE: 446 MS
QTC - MUSE: 512 MS
R AXIS - MUSE: 113 DEGREES
R AXIS - MUSE: 6 DEGREES
SODIUM SERPL-SCNC: 131 MMOL/L (ref 136–145)
SYSTOLIC BLOOD PRESSURE - MUSE: NORMAL MMHG
SYSTOLIC BLOOD PRESSURE - MUSE: NORMAL MMHG
T AXIS - MUSE: 187 DEGREES
T AXIS - MUSE: 267 DEGREES
VENTRICULAR RATE- MUSE: 82 BPM
VENTRICULAR RATE- MUSE: 93 BPM

## 2022-11-22 PROCEDURE — 250N000011 HC RX IP 250 OP 636

## 2022-11-22 PROCEDURE — 258N000003 HC RX IP 258 OP 636: Performed by: INTERNAL MEDICINE

## 2022-11-22 PROCEDURE — 250N000013 HC RX MED GY IP 250 OP 250 PS 637

## 2022-11-22 PROCEDURE — 93320 DOPPLER ECHO COMPLETE: CPT | Mod: 26 | Performed by: INTERNAL MEDICINE

## 2022-11-22 PROCEDURE — 250N000013 HC RX MED GY IP 250 OP 250 PS 637: Performed by: INTERNAL MEDICINE

## 2022-11-22 PROCEDURE — 99153 MOD SED SAME PHYS/QHP EA: CPT | Performed by: INTERNAL MEDICINE

## 2022-11-22 PROCEDURE — 83735 ASSAY OF MAGNESIUM: CPT | Performed by: INTERNAL MEDICINE

## 2022-11-22 PROCEDURE — 93312 ECHO TRANSESOPHAGEAL: CPT | Mod: 26 | Performed by: INTERNAL MEDICINE

## 2022-11-22 PROCEDURE — 250N000009 HC RX 250: Performed by: INTERNAL MEDICINE

## 2022-11-22 PROCEDURE — 250N000011 HC RX IP 250 OP 636: Performed by: INTERNAL MEDICINE

## 2022-11-22 PROCEDURE — 99233 SBSQ HOSP IP/OBS HIGH 50: CPT | Mod: 25

## 2022-11-22 PROCEDURE — 76376 3D RENDER W/INTRP POSTPROCES: CPT | Mod: 26 | Performed by: INTERNAL MEDICINE

## 2022-11-22 PROCEDURE — 36415 COLL VENOUS BLD VENIPUNCTURE: CPT

## 2022-11-22 PROCEDURE — 93325 DOPPLER ECHO COLOR FLOW MAPG: CPT

## 2022-11-22 PROCEDURE — 99152 MOD SED SAME PHYS/QHP 5/>YRS: CPT | Performed by: INTERNAL MEDICINE

## 2022-11-22 PROCEDURE — 214N000001 HC R&B CCU UMMC

## 2022-11-22 PROCEDURE — 93325 DOPPLER ECHO COLOR FLOW MAPG: CPT | Mod: 26 | Performed by: INTERNAL MEDICINE

## 2022-11-22 PROCEDURE — 93880 EXTRACRANIAL BILAT STUDY: CPT | Mod: 26 | Performed by: RADIOLOGY

## 2022-11-22 PROCEDURE — 93880 EXTRACRANIAL BILAT STUDY: CPT

## 2022-11-22 PROCEDURE — 250N000012 HC RX MED GY IP 250 OP 636 PS 637

## 2022-11-22 PROCEDURE — 80048 BASIC METABOLIC PNL TOTAL CA: CPT

## 2022-11-22 PROCEDURE — 76376 3D RENDER W/INTRP POSTPROCES: CPT

## 2022-11-22 RX ORDER — ACYCLOVIR 200 MG/1
9.5 CAPSULE ORAL
Status: DISCONTINUED | OUTPATIENT
Start: 2022-11-22 | End: 2022-11-28 | Stop reason: HOSPADM

## 2022-11-22 RX ORDER — NALOXONE HYDROCHLORIDE 0.4 MG/ML
0.2 INJECTION, SOLUTION INTRAMUSCULAR; INTRAVENOUS; SUBCUTANEOUS
Status: ACTIVE | OUTPATIENT
Start: 2022-11-22 | End: 2022-11-24

## 2022-11-22 RX ORDER — NALOXONE HYDROCHLORIDE 0.4 MG/ML
0.4 INJECTION, SOLUTION INTRAMUSCULAR; INTRAVENOUS; SUBCUTANEOUS
Status: ACTIVE | OUTPATIENT
Start: 2022-11-22 | End: 2022-11-24

## 2022-11-22 RX ORDER — FLUMAZENIL 0.1 MG/ML
0.2 INJECTION, SOLUTION INTRAVENOUS
Status: ACTIVE | OUTPATIENT
Start: 2022-11-22 | End: 2022-11-24

## 2022-11-22 RX ORDER — FENTANYL CITRATE 50 UG/ML
50 INJECTION, SOLUTION INTRAMUSCULAR; INTRAVENOUS ONCE
Status: DISCONTINUED | OUTPATIENT
Start: 2022-11-22 | End: 2022-11-24

## 2022-11-22 RX ORDER — LIDOCAINE HYDROCHLORIDE 20 MG/ML
15 SOLUTION OROPHARYNGEAL ONCE
Status: COMPLETED | OUTPATIENT
Start: 2022-11-22 | End: 2022-11-22

## 2022-11-22 RX ORDER — LIDOCAINE 40 MG/G
CREAM TOPICAL
Status: DISCONTINUED | OUTPATIENT
Start: 2022-11-22 | End: 2022-11-28 | Stop reason: HOSPADM

## 2022-11-22 RX ORDER — FENTANYL CITRATE 50 UG/ML
25 INJECTION, SOLUTION INTRAMUSCULAR; INTRAVENOUS
Status: DISCONTINUED | OUTPATIENT
Start: 2022-11-22 | End: 2022-11-24

## 2022-11-22 RX ORDER — SODIUM CHLORIDE 9 MG/ML
INJECTION, SOLUTION INTRAVENOUS CONTINUOUS PRN
Status: DISCONTINUED | OUTPATIENT
Start: 2022-11-22 | End: 2022-11-22

## 2022-11-22 RX ADMIN — Medication 200 MG: at 20:14

## 2022-11-22 RX ADMIN — MIDAZOLAM 1 MG: 1 INJECTION INTRAMUSCULAR; INTRAVENOUS at 12:21

## 2022-11-22 RX ADMIN — HYDROXYCHLOROQUINE SULFATE 200 MG: 200 TABLET, FILM COATED ORAL at 20:14

## 2022-11-22 RX ADMIN — FENTANYL CITRATE 25 MCG: 50 INJECTION, SOLUTION INTRAMUSCULAR; INTRAVENOUS at 12:23

## 2022-11-22 RX ADMIN — FUROSEMIDE 80 MG: 10 INJECTION, SOLUTION INTRAVENOUS at 07:37

## 2022-11-22 RX ADMIN — TOPICAL ANESTHETIC 0.5 ML: 200 SPRAY DENTAL; PERIODONTAL at 12:01

## 2022-11-22 RX ADMIN — GABAPENTIN 100 MG: 100 CAPSULE ORAL at 22:13

## 2022-11-22 RX ADMIN — APIXABAN 5 MG: 5 TABLET, FILM COATED ORAL at 20:14

## 2022-11-22 RX ADMIN — Medication 3 MG: at 07:39

## 2022-11-22 RX ADMIN — ATENOLOL 50 MG: 25 TABLET ORAL at 07:38

## 2022-11-22 RX ADMIN — MIDAZOLAM 1 MG: 1 INJECTION INTRAMUSCULAR; INTRAVENOUS at 12:23

## 2022-11-22 RX ADMIN — SODIUM CHLORIDE 100 ML/HR: 9 INJECTION, SOLUTION INTRAVENOUS at 12:57

## 2022-11-22 RX ADMIN — SPIRONOLACTONE 50 MG: 25 TABLET ORAL at 07:38

## 2022-11-22 RX ADMIN — LIDOCAINE HYDROCHLORIDE 30 ML: 20 SOLUTION OROPHARYNGEAL at 11:54

## 2022-11-22 RX ADMIN — APIXABAN 5 MG: 5 TABLET, FILM COATED ORAL at 07:39

## 2022-11-22 RX ADMIN — ATENOLOL 50 MG: 25 TABLET ORAL at 20:14

## 2022-11-22 RX ADMIN — FENTANYL CITRATE 25 MCG: 50 INJECTION, SOLUTION INTRAMUSCULAR; INTRAVENOUS at 12:21

## 2022-11-22 RX ADMIN — LORATADINE 10 MG: 10 TABLET ORAL at 07:39

## 2022-11-22 RX ADMIN — GABAPENTIN 100 MG: 100 CAPSULE ORAL at 14:06

## 2022-11-22 RX ADMIN — GABAPENTIN 100 MG: 100 CAPSULE ORAL at 17:04

## 2022-11-22 RX ADMIN — FUROSEMIDE 80 MG: 10 INJECTION, SOLUTION INTRAVENOUS at 15:33

## 2022-11-22 RX ADMIN — GABAPENTIN 100 MG: 100 CAPSULE ORAL at 04:29

## 2022-11-22 ASSESSMENT — ACTIVITIES OF DAILY LIVING (ADL)
ADLS_ACUITY_SCORE: 31
ADLS_ACUITY_SCORE: 31
ADLS_ACUITY_SCORE: 33
ADLS_ACUITY_SCORE: 31
ADLS_ACUITY_SCORE: 33
ADLS_ACUITY_SCORE: 31
ADLS_ACUITY_SCORE: 33
ADLS_ACUITY_SCORE: 31

## 2022-11-22 NOTE — PROGRESS NOTES
D-Admitted on 11/19/22 with heart failure exacerbation. Presented with 8 lb weight gain, BLE edema, and MONTALVO. PMH includes atrial tachyarthymias, cardiac arrest, DLBC lymphoma, SSS, s/p PPM, RA and tricuspid regurgitation, severe. See flow sheets for vs and assessments.  I- Jardiance initiated today. Diuresing with IV lasix. EP consulted. CVTS consulted for TR.  A-Pt has voided 1550 since receiving IV lasix this afternoon. Telemetry trend shows v paced and  afib .  P-Continue with current poc. NPO after midnight for BRE. Pt is aware of the plan. Notify provider of any concerns/changes.

## 2022-11-22 NOTE — PROGRESS NOTES
[facility]   Cardiology   Progress Note     ASSESSMENT/PLAN:  Amelia Michel is a 61 year old female with a PMH significant for HTN, chronic diastolic heart failure, atrial tacchyarrhythmias (on Eliquis), cardiac arrest (2017), SSS s/p PPM (12/2019), RA, and DLBCL who is admitted for heart failure exacerbation.     Interval History: No acute events overnight. Patient net negative 2.7L and down ~ 4 lb. Reports improvement in her breathing. BLE also improving. Plan today for carotid US and BRE as part of pre-surgical work up. Per CVTS, will need either cardiac gated CT or coronary angiogram as well but this can likely be performed in the outpatient setting. Will continue with IV diuresis this morning; patient can likely transition to Torsemide later today or tomorrow.     Changes Today:  - Carotid US  - BRE    # Acute on Chronic Diastolic Heart Failure (EF 50%) NYHA Class III, AHA/ACC Stage C  # Severe Tricuspid Regurgitation   # Hypertension  Patient reporting MONTALVO, BLE edema, and 8 pound weight gain in the last week. She has been taking her home Lasix 20mg daily as prescribed. Chest X-ray suspicious for pulmonary edema. NT-P BNP 1,913. Troponin T 21-->12. No chest pain reported. EDW per chart review 116-120; admission weight 128. Denies fever, cough, PND, or orthopnea. She admits symptoms are similar to previous CHF exacerbations she has had in the past. She follows in our [facility] clinic.    Per Dr. Andrea's read of TTE 8/2022 and by clinical exam, TR is clearly severe and accounts in part for predominant presentation of clinical right HF. Echocardiogram with severe TR and EF   - Fluid Status: Hypervolemic; s/p IV Lasix 10mg in ED. IV Lasix 80mg BID.   - Should discharge on PO Torsemide for severe TR  - Pharmacy liaison consult for SGLT2 cost   - Continue PTA Spironolactone 50mg daily  - Continue PTA Atenolol 50mg BID  - Daily standing weights  - Strict I&O's  - Low Na Diet / 2L  "Fluid restriction  - Daily BMP; Keep K+ > 4.0, Mg > 2.0. Protocols ordered.   - BRE tomorrow  - CVTS consult      # SSS s/p PPM (2019)  # Atrial Tachyarrhythmias  Cardiac history dates back to May 2017 with atrial tachyarrhythmias and EF of 40-45% at that time. Patient feels as if her pacemaker has been \"firing more\" than her baseline. Device check report showing 11,136 episodes of \"NSVT\" since 9/25/22 although EGM's reveal irregular v-v intervals with similar morphology to intrinsic rhythm indicating probable AF w/ RVR. [id number] = 2 (+ htn, + gender). Aberrancy vs PVC's?  - Telemetry  - EP consult today for rhythm clarification/recommendations   - PRN EKG's   - Continue PTA Digoxin 125mcg   - Continue PTA Eliquis 5mg BID   - Continue PTA Atenolol 50mg BID      # Chronic Hyponatremia  Chronically low sodium since April of 2022. Could be related to hypervolemic state.   - Diuresis per above  - Daily BMP      # Rheumatoid Arthritis  - Continue PTA Prednisone 3mg daily  - Continue PTA Hydroxychloroquine 200mg daily      # Hx of Diffuse Large B-Cell Lymphoma  # Hx of Cardiac Arrest (2017)  Underwent hospitalization 6/2017 due to VF arrest in setting of normal angiogram and found to have DLBCL complicated by masses on the coronary cusps and LVOT. She underwent R-EPOCH one cycle and 5 subsequent cycles of R-CHOP completed in 1220/17 with her course complicated by pericardial effusion treated with colcichine.   - CBC every 3 days     FEN: 2g Na Diet / 2L Fluid Restriction; NPO at MN   Code Status: Full Code  Prophylaxis:  PTA Eliquis  Isolation: Contact  Disposition: 1-3 days pending work up       Patient seen and discussed with Dr. Reyes, who agrees with above plan.    Gema Gonzalez APRN, CNP  [facility] Cardiology Team    Physical Exam:  Temp:  [96.8  F (36  C)-98.3  F (36.8  C)] 96.8  F (36  C)  Pulse:  [62-87] 87  Resp:  [16] 16  BP: ()/(70-95) 119/95  Cuff Mean (mmHg):  [78-97] 97  SpO2:  [95 %-99 %] 99 %    I/O: "   Intake/Output Summary (Last 24 hours) at 11/22/2022 0915  Last data filed at 11/22/2022 0759  Gross per 24 hour   Intake 1010 ml   Output 4340 ml   Net -3330 ml       Wt:   Wt Readings from Last 5 Encounters:   11/22/22 53.7 kg (118 lb 6.4 oz)   11/03/22 55.8 kg (123 lb)   09/16/22 54.7 kg (120 lb 8 oz)   09/14/22 54.9 kg (121 lb)   08/26/22 55.5 kg (122 lb 6.4 oz)       General: NAD  HEENT:  PERRLA, EOMI.   Neck: JVD 1/3 way up neck.   CV: RRR. No murmur appreciated. No rubs or gallops. Peripheral radial pulse intact.  Resp: No increased work of breathing or use of accessory muscles, breathing comfortably on room air.  Lung sounds clear throughout/bilaterally  Abdomen:  Normal active bowel sounds.  Abdomen is soft. No distension, non-tender to palpation.   Extremities: Warm. Capillary refill less than 3 sec. 3/4 radial pulses bilaterally.  3/4 pedal pulses bilaterally. BLE pre-tibial edema. No cyanosis or clubbing.  Skin:  Warm and dry. No erythema, rashes, ulceration or diaphoresis.  Neuro: Alert and oriented x3.         Medications:    apixaban ANTICOAGULANT  5 mg Oral BID     atenolol  50 mg Oral BID     digoxin  125 mcg Oral Daily     empagliflozin  10 mg Oral Daily     furosemide  80 mg Intravenous BID     gabapentin  100 mg Oral Q6H     hydroxychloroquine  200 mg Oral Daily     loratadine  10 mg Oral Daily     magnesium oxide  200 mg Oral Daily     predniSONE  3 mg Oral Daily     sodium chloride (PF)  3 mL Intracatheter Q8H     spironolactone  50 mg Oral Daily       - MEDICATION INSTRUCTIONS -       - MEDICATION INSTRUCTIONS -       - MEDICATION INSTRUCTIONS -       - MEDICATION INSTRUCTIONS -         Labs:   CMP  Recent Labs   Lab 11/22/22  0732 11/21/22  0748 11/20/22  0602 11/19/22  1347 11/19/22  1345   * 129* 126* 127* 125*   POTASSIUM 4.0 3.9 4.1 4.7 4.9   CHLORIDE 93* 93* 91*  --  89*   CO2 23 23 16*  --  20*   ANIONGAP 15 13 19*  --  16*   GLC 91 70 77 88 86   BUN 28.9* 30.0* 34.2*  --  31.4*    CR 1.32* 1.21* 1.18*  --  1.19*   GFRESTIMATED 46* 51* 52*  --  52*   VIKTORIYA 9.1 9.0 9.3  --  9.8   MAG 2.4* 1.9 1.9  --  2.1   PROTTOTAL  --   --   --   --  7.6   ALBUMIN  --   --   --   --  4.3   BILITOTAL  --   --   --   --  2.8*   ALKPHOS  --   --   --   --  125   AST  --   --   --   --  125*   ALT  --   --   --   --  87*     CBC  Recent Labs   Lab 22  0602 22  1347 22  1316 22  1314   WBC 5.6  --   --  5.9   RBC 4.02  --   --  4.52   HGB 14.4 18.0* 17.3 16.1   HCT 41.8 53 51 46.8   *  --   --  104*   MCH 35.8*  --   --  35.6*   MCHC 34.4  --   --  34.4   RDW 15.7*  --   --  15.5*   PLT 98*  --   --  106*     INR  Recent Labs   Lab 22  1314   INR 4.57*     Arterial Blood GasNo lab results found in last 7 days.    Diagnostics:  22 EC/20/22 Echocardiogram:  Global and regional left ventricular function is normal with an EF of 55-60%.  Borderline right ventricular enlargement.  Global right ventricular function is normal.  Severe tricuspid insufficiency is present.  IVC diameter >2.1 cm collapsing <50% with sniff suggests a high RA pressure  estimated at 15 mmHg or greater.  This study was compared with the study from 22: No significant changes  noted. Specifically, TR is severe on both studies.    No results found for this or any previous visit (from the past 24 hour(s)).    Time Spent on this Encounter   I spent 35 minutes on the unit/floor managing the care of Amelia Michel. Over 50% of my time was spent on the following:   - Counseling the patient and/or family regarding: diagnostic results and risks and benefits of treatment options  - Coordination of care with the: consultant(s)      ELIZABETH Izquierdo CNP

## 2022-11-22 NOTE — PLAN OF CARE
Hours of Care:  2300 - 0700    Temp:  [97.2  F (36.2  C)-98.3  F (36.8  C)] 98.3  F (36.8  C)  Pulse:  [] 68  Resp:  [16] 16  BP: ()/(70-88) 109/88  Cuff Mean (mmHg):  [78-97] 97  SpO2:  [95 %-100 %] 99 %     D: Amelia Michel is a 61 year old female with a PMH significant for HTN, chronic diastolic heart failure, atrial tacchyarrhythmias (on Eliquis), cardiac arrest (2017), SSS s/p PPM (12/2019), RA, and DLBCL who is admitted for heart failure exacerbation.     I: Monitored vitals and assessed pt status.     Tele: V Paced, intermittent aberrant atrial rhythm  O2: Room air  Mobility: Independent    A: Neuro: A/O x4.  Call light appropriate.  Able to make needs known.  Respiratory:  On room air.  Lung sounds clear.  Denies shortness of breath at rest.  States breathing has significantly improved with diuresis.  Receiving 80 mg lasix IVP BID  Cardiac: VSS.  Paced rhythm with intermittent aberrant atrial rhythm.  Murmur appreciated.  Receives apixaban BID.  No s/s of bleeding.  GI: Last BM 11/21.  No report of nausea or vomiting.  : Urinating adequate amounts of clear, anselmo urine  Skin:  See PCS for assessment and treatment of wounds and surgical incisions.  LDA:  22 G PIV RFA   Electrolytes: RN managed Mg and K.  Lab pending.  Pain: Denies  Isolation:  Contact Precautions VRE ESBL  Tests/procedures: None performed overnight.  Diet: NPO after midnight for BRE.  Otherwise, 2 g Na.  2000 mL fluid restriction    P: Continue to monitor Pt status and report changes to Cards 1.  Plan for BRE today.    Intake/Output Summary (Last 24 hours) at 11/22/2022 0520  Last data filed at 11/22/2022 0400  Gross per 24 hour   Intake 1250 ml   Output 4340 ml   Net -3090 ml

## 2022-11-22 NOTE — SEDATION DOCUMENTATION
Pt arrived in ECHO department  for scheduled BRE.   Procedure explained, questions answered and consent signed. Discharge instructions discussed with patient.  Pt's throat sprayed at 1200, therefore pt will not be able to eat or drink until 2 hours after at 1400 .  Informed pt of this time and encouraged to start with warm fluids and soft foods.    Pt tolerated procedure well, and was given a total of 50 mcg IV fentanyl and 2 mg IV versed for conscious sedation.  Pt denied throat or chest pain after BRE complete.   BRE loaner probe used for procedure.  Pt denied throat pain after procedure and was transported via stretcher back to  after awake and VSS.    Report given to Oskar AMBROCIO RN.

## 2022-11-22 NOTE — PRE-PROCEDURE
GENERAL PRE-PROCEDURE:   Procedure:  Transesophageal echocardiogram  Date/Time:  11/22/2022 11:52 AM    Verbal consent obtained?: Yes    Written consent obtained?: Yes    Risks and benefits: Risks, benefits and alternatives were discussed    Consent given by:  Patient  Patient states understanding of procedure being performed: Yes    Patient's understanding of procedure matches consent: Yes    Procedure consent matches procedure scheduled: Yes    Expected level of sedation:  Moderate  Appropriately NPO:  Yes  ASA Class:  3  Mallampati  :  Grade 2- soft palate, base of uvula, tonsillar pillars, and portion of posterior pharyngeal wall visible  Lungs:  Lungs clear with good breath sounds bilaterally  Heart:  Normal heart sounds and rate and systolic murmur  History & Physical reviewed:  History and physical reviewed and no updates needed  Statement of review:  I have reviewed the lab findings, diagnostic data, medications, and the plan for sedation

## 2022-11-22 NOTE — CONSULTS
Care Management Initial Consult    General Information  Assessment completed with: Patient,    Type of CM/SW Visit: Initial Assessment    Primary Care Provider verified and updated as needed: Yes   Readmission within the last 30 days:        Reason for Consult: discharge planning  Advance Care Planning: Advance Care Planning Reviewed: no concerns identified        Communication Assessment  Patient's communication style: spoken language (English or Bilingual)    Hearing Difficulty or Deaf: no   Wear Glasses or Blind: yes    Cognitive  Cognitive/Neuro/Behavioral: WDL                      Living Environment:   People in home: spouse     Current living Arrangements: house      Able to return to prior arrangements: yes       Family/Social Support:  Care provided by: self  Provides care for: no one  Marital Status:             Description of Support System: Supportive, Involved    Support Assessment: Adequate family and caregiver support    Current Resources:   Patient receiving home care services: No     Community Resources: None  Equipment currently used at home: walker, rolling (Pt states she only uses when feeling weaker/fatigued)  Supplies currently used at home:      Employment/Financial:  Financial Concerns: No concerns identified     Functional Status:  Prior to admission patient needed assistance:   Dependent ADLs:: Independent  Dependent IADLs:: Independent    Additional Information:  This writer called and spoke with pt to complete initial assessment. Pt with history of HTN, chronic diastolic heart failure, cardiac arrest (2017), PPM placement (2019) admitted for heart failure exacerbation.    Pt from home with , independent at baseline. Pt does not receive any home services. Pt's  is able to help as needed.     CC will continue to monitor patient's medical condition and progress towards discharge.  Rose Mayes RN BSN  6C Unit Care Coordinator  Phone number: [phone]  Pager:  [phone]

## 2022-11-23 ENCOUNTER — APPOINTMENT (OUTPATIENT)
Dept: CT IMAGING | Facility: CLINIC | Age: 62
DRG: 273 | End: 2022-11-23
Payer: COMMERCIAL

## 2022-11-23 ENCOUNTER — APPOINTMENT (OUTPATIENT)
Dept: OCCUPATIONAL THERAPY | Facility: CLINIC | Age: 62
DRG: 273 | End: 2022-11-23
Payer: COMMERCIAL

## 2022-11-23 LAB
ANION GAP SERPL CALCULATED.3IONS-SCNC: 12 MMOL/L (ref 7–15)
ATRIAL RATE - MUSE: 86 BPM
BUN SERPL-MCNC: 31.3 MG/DL (ref 8–23)
CALCIUM SERPL-MCNC: 8.4 MG/DL (ref 8.8–10.2)
CHLORIDE SERPL-SCNC: 92 MMOL/L (ref 98–107)
CREAT SERPL-MCNC: 1.29 MG/DL (ref 0.51–1.17)
DEPRECATED HCO3 PLAS-SCNC: 25 MMOL/L (ref 22–29)
DIASTOLIC BLOOD PRESSURE - MUSE: NORMAL MMHG
ERYTHROCYTE [DISTWIDTH] IN BLOOD BY AUTOMATED COUNT: 16.3 % (ref 10–15)
GFR SERPL CREATININE-BSD FRML MDRD: 47 ML/MIN/1.73M2
GLUCOSE SERPL-MCNC: 85 MG/DL (ref 70–99)
HCT VFR BLD AUTO: 42.3 % (ref 35–53)
HGB BLD-MCNC: 14.8 G/DL (ref 11.7–17.7)
INTERPRETATION ECG - MUSE: NORMAL
MAGNESIUM SERPL-MCNC: 2.2 MG/DL (ref 1.7–2.3)
MCH RBC QN AUTO: 36.1 PG (ref 26.5–33)
MCHC RBC AUTO-ENTMCNC: 35 G/DL (ref 31.5–36.5)
MCV RBC AUTO: 103 FL (ref 78–100)
P AXIS - MUSE: NORMAL DEGREES
PLATELET # BLD AUTO: 90 10E3/UL (ref 150–450)
POTASSIUM SERPL-SCNC: 3.8 MMOL/L (ref 3.4–5.3)
PR INTERVAL - MUSE: NORMAL MS
QRS DURATION - MUSE: 92 MS
QT - MUSE: 392 MS
QTC - MUSE: 469 MS
R AXIS - MUSE: 106 DEGREES
RBC # BLD AUTO: 4.1 10E6/UL (ref 3.8–5.9)
SODIUM SERPL-SCNC: 129 MMOL/L (ref 136–145)
SYSTOLIC BLOOD PRESSURE - MUSE: NORMAL MMHG
T AXIS - MUSE: 261 DEGREES
VENTRICULAR RATE- MUSE: 86 BPM
WBC # BLD AUTO: 6.1 10E3/UL (ref 4–11)

## 2022-11-23 PROCEDURE — 97165 OT EVAL LOW COMPLEX 30 MIN: CPT | Mod: GO

## 2022-11-23 PROCEDURE — 93005 ELECTROCARDIOGRAM TRACING: CPT

## 2022-11-23 PROCEDURE — 250N000013 HC RX MED GY IP 250 OP 250 PS 637: Performed by: INTERNAL MEDICINE

## 2022-11-23 PROCEDURE — 70486 CT MAXILLOFACIAL W/O DYE: CPT | Mod: 26 | Performed by: RADIOLOGY

## 2022-11-23 PROCEDURE — 93010 ELECTROCARDIOGRAM REPORT: CPT | Performed by: INTERNAL MEDICINE

## 2022-11-23 PROCEDURE — 85027 COMPLETE CBC AUTOMATED: CPT

## 2022-11-23 PROCEDURE — G1010 CDSM STANSON: HCPCS | Mod: GC | Performed by: RADIOLOGY

## 2022-11-23 PROCEDURE — 250N000012 HC RX MED GY IP 250 OP 636 PS 637

## 2022-11-23 PROCEDURE — 97530 THERAPEUTIC ACTIVITIES: CPT | Mod: GO

## 2022-11-23 PROCEDURE — 99233 SBSQ HOSP IP/OBS HIGH 50: CPT

## 2022-11-23 PROCEDURE — 250N000011 HC RX IP 250 OP 636

## 2022-11-23 PROCEDURE — 70486 CT MAXILLOFACIAL W/O DYE: CPT

## 2022-11-23 PROCEDURE — 83735 ASSAY OF MAGNESIUM: CPT | Performed by: INTERNAL MEDICINE

## 2022-11-23 PROCEDURE — 214N000001 HC R&B CCU UMMC

## 2022-11-23 PROCEDURE — 250N000013 HC RX MED GY IP 250 OP 250 PS 637

## 2022-11-23 PROCEDURE — 97535 SELF CARE MNGMENT TRAINING: CPT | Mod: GO

## 2022-11-23 PROCEDURE — 36415 COLL VENOUS BLD VENIPUNCTURE: CPT

## 2022-11-23 PROCEDURE — 82310 ASSAY OF CALCIUM: CPT

## 2022-11-23 RX ORDER — POTASSIUM CHLORIDE 750 MG/1
20 TABLET, EXTENDED RELEASE ORAL ONCE
Status: COMPLETED | OUTPATIENT
Start: 2022-11-23 | End: 2022-11-23

## 2022-11-23 RX ORDER — TORSEMIDE 20 MG/1
40 TABLET ORAL
Status: CANCELLED | OUTPATIENT
Start: 2022-11-23

## 2022-11-23 RX ORDER — TORSEMIDE 20 MG/1
40 TABLET ORAL
Status: DISCONTINUED | OUTPATIENT
Start: 2022-11-23 | End: 2022-11-26

## 2022-11-23 RX ORDER — POTASSIUM CHLORIDE 750 MG/1
20 TABLET, EXTENDED RELEASE ORAL 2 TIMES DAILY
Status: DISCONTINUED | OUTPATIENT
Start: 2022-11-23 | End: 2022-12-01

## 2022-11-23 RX ADMIN — GABAPENTIN 100 MG: 100 CAPSULE ORAL at 09:52

## 2022-11-23 RX ADMIN — Medication 3 MG: at 08:17

## 2022-11-23 RX ADMIN — HYDROXYCHLOROQUINE SULFATE 200 MG: 200 TABLET, FILM COATED ORAL at 21:42

## 2022-11-23 RX ADMIN — GABAPENTIN 100 MG: 100 CAPSULE ORAL at 21:43

## 2022-11-23 RX ADMIN — TORSEMIDE 40 MG: 20 TABLET ORAL at 09:52

## 2022-11-23 RX ADMIN — ATENOLOL 50 MG: 25 TABLET ORAL at 08:18

## 2022-11-23 RX ADMIN — POTASSIUM CHLORIDE 20 MEQ: 750 TABLET, EXTENDED RELEASE ORAL at 09:52

## 2022-11-23 RX ADMIN — ATENOLOL 50 MG: 25 TABLET ORAL at 21:42

## 2022-11-23 RX ADMIN — TORSEMIDE 40 MG: 20 TABLET ORAL at 17:25

## 2022-11-23 RX ADMIN — GABAPENTIN 100 MG: 100 CAPSULE ORAL at 17:25

## 2022-11-23 RX ADMIN — Medication 200 MG: at 21:42

## 2022-11-23 RX ADMIN — EMPAGLIFLOZIN 10 MG: 10 TABLET, FILM COATED ORAL at 08:17

## 2022-11-23 RX ADMIN — POTASSIUM CHLORIDE 20 MEQ: 750 TABLET, EXTENDED RELEASE ORAL at 21:42

## 2022-11-23 RX ADMIN — SPIRONOLACTONE 50 MG: 25 TABLET ORAL at 08:17

## 2022-11-23 RX ADMIN — GABAPENTIN 100 MG: 100 CAPSULE ORAL at 04:12

## 2022-11-23 RX ADMIN — ACETAMINOPHEN 650 MG: 325 TABLET, FILM COATED ORAL at 22:56

## 2022-11-23 RX ADMIN — APIXABAN 5 MG: 5 TABLET, FILM COATED ORAL at 21:43

## 2022-11-23 ASSESSMENT — ACTIVITIES OF DAILY LIVING (ADL)
ADLS_ACUITY_SCORE: 33
ADLS_ACUITY_SCORE: 29
PREVIOUS_RESPONSIBILITIES: MEAL PREP;HOUSEKEEPING;LAUNDRY;SHOPPING;MEDICATION MANAGEMENT;DRIVING
ADLS_ACUITY_SCORE: 33
ADLS_ACUITY_SCORE: 29
ADLS_ACUITY_SCORE: 33
ADLS_ACUITY_SCORE: 29

## 2022-11-23 NOTE — PROGRESS NOTES
[facility]   Cardiology   Progress Note     ASSESSMENT/PLAN:  Amelia Michel is a 61 year old female with a PMH significant for HTN, chronic diastolic heart failure, atrial tacchyarrhythmias (on Eliquis), cardiac arrest (2017), SSS s/p PPM (12/2019), RA, and DLBCL who is admitted for heart failure exacerbation.     Interval History: Provider notification overnight for a 14 beat run of NSVT. Patient was sleeping and asymptomatic. Digoxin discontinued yesterday per EP recommendations. Patient continues to deny chest pain, palpitations, or shortness of breath. Weight today 116 which is her EDW. Will transition to PO Torsemide today in lieu of IV Lasix. Patient received Eliquis last evening so will need to postpone coronary angiogram to Monday BRE completed yesterday as part of pre-surgical work up. Plan for PT/OT Eval, dental CT and evaluation.     Changes Today:  - Transition to PO Torsemide 40mg BID  - Add scheduled potassium 20mEq BID  - PT/OT Consult  - Dental CT and consult per CVTS request     # Acute on Chronic Diastolic Heart Failure (EF 50%) NYHA Class III, AHA/ACC Stage C  # Severe Tricuspid Regurgitation   # Hypertension  Patient reporting MONTALVO, BLE edema, and 8 pound weight gain in the last week. She has been taking her home Lasix 20mg daily as prescribed. Chest X-ray suspicious for pulmonary edema. NT-P BNP 1,913. Troponin T 21-->12. No chest pain reported. EDW per chart review 116-120; admission weight 128. Denies fever, cough, PND, or orthopnea. She admits symptoms are similar to previous CHF exacerbations she has had in the past. She follows in our [facility].    Per Dr. Andrea's read of TTE 8/2022 and by clinical exam, TR is clearly severe and accounts in part for predominant presentation of clinical right HF. Echocardiogram with severe TR and EF   - Fluid Status: Euvolemic; Start PO Torsemide 40mg BID  - Should discharge on PO Torsemide for severe TR  - Continue  "Jardiance 10mg daily   - Continue PTA Spironolactone 50mg daily  - Continue PTA Atenolol 50mg BID  - Daily standing weights  - Strict I&O's  - Low Na Diet / 2L Fluid restriction  - Daily BMP; Keep K+ > 4.0, Mg > 2.0. Protocols ordered.   - CVTS consult; appreciate ongoing recommendations  - Plan for right heart cath and coronary angiogram Monday; NPO ordered.      # SSS s/p PPM (2019)  # Atrial Tachyarrhythmias  Cardiac history dates back to May 2017 with atrial tachyarrhythmias and EF of 40-45% at that time. Patient feels as if her pacemaker has been \"firing more\" than her baseline. Device check report showing 11,136 episodes of \"NSVT\" since 9/25/22 although EGM's reveal irregular v-v intervals with similar morphology to intrinsic rhythm indicating probable AF w/ RVR. [id number] = 2 (+ htn, + gender). 14 beat run of NSVT on 11/23 @ 0451; asymptomatic.  - Telemetry  - EP consulted and recommended discontinuing digoxin   - PRN EKG's   - Discontinue PTA Digoxin per EP   - Continue PTA Eliquis 5mg BID; end date of 11/25 PM entered for holding for cath Monday  - Continue PTA Atenolol 50mg BID      # Chronic Hyponatremia  # Hypokalemia  Chronically low sodium since April of 2022. Could be related to hypervolemic state.   - Diuresis per above  - Daily BMP   - Start Potassium 20mEq BID      # Rheumatoid Arthritis  - Continue PTA Prednisone 3mg daily  - Continue PTA Hydroxychloroquine 200mg daily      # Hx of Diffuse Large B-Cell Lymphoma  # Hx of Cardiac Arrest (2017)  Underwent hospitalization 6/2017 due to VF arrest in setting of normal angiogram and found to have DLBCL complicated by masses on the coronary cusps and LVOT. She underwent R-EPOCH one cycle and 5 subsequent cycles of R-CHOP completed in 1220/17 with her course complicated by pericardial effusion treated with colcichine.   - CBC every 3 days     FEN: Low Na / 2L Fluid Restriction   Code Status: Full Code  Prophylaxis:  PTA Eliquis; Holding "   Isolation: Contact  Disposition: 1-3 days pending work up       Patient seen and discussed with Dr. Reyes, who agrees with above plan.    Gema JOHNSON, CNP  [facility] Cardiology Team    Physical Exam:  Temp:  [97.7  F (36.5  C)-98.3  F (36.8  C)] 98.3  F (36.8  C)  Pulse:  [59-89] 73  Resp:  [10-18] 16  BP: ()/(57-87) 111/80  SpO2:  [95 %-100 %] 96 %    I/O:   Intake/Output Summary (Last 24 hours) at 11/23/2022 0859  Last data filed at 11/23/2022 0400  Gross per 24 hour   Intake 320 ml   Output 2100 ml   Net -1780 ml       Wt:   Wt Readings from Last 5 Encounters:   11/23/22 52.9 kg (116 lb 11.2 oz)   11/03/22 55.8 kg (123 lb)   09/16/22 54.7 kg (120 lb 8 oz)   09/14/22 54.9 kg (121 lb)   08/26/22 55.5 kg (122 lb 6.4 oz)     General: NAD  HEENT:  PERRLA, EOMI.   Neck: JVD 1/3 way up neck.   CV: RRR. No murmur appreciated. No rubs or gallops. Peripheral radial pulse intact.  Resp: No increased work of breathing or use of accessory muscles, breathing comfortably on room air.  Lung sounds clear throughout/bilaterally  Abdomen:  Normal active bowel sounds.  Abdomen is soft. No distension, non-tender to palpation.   Extremities: Warm. Capillary refill less than 3 sec. 3/4 radial pulses bilaterally.  3/4 pedal pulses bilaterally. BLE edema. No cyanosis or clubbing.  Skin:  Warm and dry. No erythema, rashes, ulceration or diaphoresis.  Neuro: Alert and oriented x3.       Medications:    [Held by provider] apixaban ANTICOAGULANT  5 mg Oral BID     atenolol  50 mg Oral BID     empagliflozin  10 mg Oral Daily     fentaNYL  50 mcg Intravenous Once     furosemide  80 mg Intravenous BID     gabapentin  100 mg Oral Q6H     hydroxychloroquine  200 mg Oral Daily     loratadine  10 mg Oral Daily     magnesium oxide  200 mg Oral Daily     predniSONE  3 mg Oral Daily     sodium chloride (PF)  3 mL Intracatheter Q8H     sodium chloride (PF)  3 mL Intracatheter Q8H     spironolactone  50 mg Oral Daily       - MEDICATION  INSTRUCTIONS -       - MEDICATION INSTRUCTIONS -         Labs:   CMP  Recent Labs   Lab 22  0542 22  0732 22  0748 22  0602 22  1347 22  1345   NA  --  131* 129* 126* 127* 125*   POTASSIUM  --  4.0 3.9 4.1 4.7 4.9   CHLORIDE  --  93* 93* 91*  --  89*   CO2  --   16*  --  20*   ANIONGAP  --  15 13 19*  --  16*   GLC  --  91 70 77 88 86   BUN  --  28.9* 30.0* 34.2*  --  31.4*   CR  --  1.32* 1.21* 1.18*  --  1.19*   GFRESTIMATED  --  46* 51* 52*  --  52*   VIKTORIYA  --  9.1 9.0 9.3  --  9.8   MAG 2.2 2.4* 1.9 1.9  --  2.1   PROTTOTAL  --   --   --   --   --  7.6   ALBUMIN  --   --   --   --   --  4.3   BILITOTAL  --   --   --   --   --  2.8*   ALKPHOS  --   --   --   --   --  125   AST  --   --   --   --   --  125*   ALT  --   --   --   --   --  87*     CBC  Recent Labs   Lab 22  0542 22  0602 22  1347 22  1316 22  1314   WBC 6.1 5.6  --   --  5.9   RBC 4.10 4.02  --   --  4.52   HGB 14.8 14.4 18.0* 17.3 16.1   HCT 42.3 41.8 53 51 46.8   * 104*  --   --  104*   MCH 36.1* 35.8*  --   --  35.6*   MCHC 35.0 34.4  --   --  34.4   RDW 16.3* 15.7*  --   --  15.5*   PLT 90* 98*  --   --  106*     INR  Recent Labs   Lab 22  1314   INR 4.57*     Arterial Blood GasNo lab results found in last 7 days.    Diagnostics:  22 EC/22/22 Transesophageal Echo:  Global and regional left ventricular function is normal with an EF of 55-60%.  Mild to moderate right ventricular dilation is present. Right ventricular  function is normal.  Severe tricuspid insufficiency is present due to failure of the valve leaflets  (septal-anterior) to coapt. There is no obvious leaflet tethering due to the  pacemaker lead. There are no valvular vegetations present.  No pericardial effusion is present.      Recent Results (from the past 24 hour(s))   US Carotid Bilateral    Narrative    BILATERAL CAROTID DUPLEX DOPPLER ULTRASOUND 2022 9:23 AM    CLINICAL  HISTORY: pre surgical work up    COMPARISON: None available.    REFERRING PROVIDER: CONCEPCION MARY    TECHNIQUE: Grayscale (B-mode) and duplex and spectral Doppler  ultrasound of the common carotid, extracranial internal carotid,  external carotid, and vertebral artery origins. Velocity measurements  obtained with angle correction at or less than 60 degrees.    FINDINGS:    RIGHT SIDE:     Plaque Morphology: Minimal plaque.       Proximal CCA: 56/17 cm/s, 51/13 cm/s     Mid CCA: 84/20 cm/s     Distal CCA: 72/19 cm/s           External CA: 73/13 cm/s       Proximal ICA: 29/16 cm/s     Mid ICA: 65/25 cm/s     Distal ICA: 61/18 cm/s       Vertebral artery: Antegrade: 46/9 cm/s     ICA/CCA ratio: 0.90     LEFT SIDE:     Plaque Morphology: Minimal plaque.        Proximal CCA: 60/18 cm/s     Mid CCA: 80/25 cm/s     Distal CCA: 55/16 cm/s           External CA: 89/14 cm/s       Proximal ICA: 47/14 cm/s     Mid ICA: 56/16 cm/s     Distal ICA: 75/28 cm/s       Vertebral artery: Antegrade: 25/4 cm/s     ICA/CCA ratio: 1.36       Impression    IMPRESSION:    1. RIGHT ICA: Less than 50% diameter narrowing by grayscale imaging  and sonographic velocity criteria.    2. LEFT ICA:  Less than 50% diameter narrowing by grayscale imaging  and sonographic velocity criteria.    Intersocietal Accreditation Commission Updated Recommendations for  Carotid Stenosis Interpretation Criteria - October 2021.  https://intersocietal.org/wp-content/uploads/2021/10/IAC-Vascular-Test  fi-Hvsxaqezvlytd_Wscovzo-Pgleongfmrblnjc-for-Carotid-Stenosis-Interpre  ation-Criteria.pdf    Greater than 70% diameter stenosis criteria per - Journal of Vascular  Surgery Vol. 75Issue 1Supplement [id number] Published online: June 18, 2021          Normal            ICA PSV: < 180 cm/s            Plaque Estimate: None            ICA/CCA PSV Ratio: < 2.0            ICA EDV: < 40 cm/s         < 50%            ICA PSV: < 180 cm/s            Plaque Estimate: < 50%             ICA/CCA PSV Ratio: < 2.0            ICA EDV: < 40 cm/s         50 - 69%            ICA PSV: 180 - 230 cm/s            Plaque Estimate: > 50%            ICA/CCA PSV Ratio: 2.0 - 4.0            ICA EDV: 40 - 100 cm/s         > 70% but less than near occlusion            ICA PSV: > 230 cm/s            Plaque Estimate: > 50%            AND either            ICA EDV: > 100 cm/s            or            ICA/CCA PSV Ratio: > 4.0         Near occlusion            ICA PSV: > 230 cm/s            Plaque Estimate: Visible            ICA/CCA PSV Ratio: Variable            ICA EDV: Variable         Total occlusion            ICA PSV: Undetectable            Plaque Estimate: Visible, no detectable lumen            ICA/CCA PSV Ratio: N/A            ICA EDV: N/A                                          Additional criteria from vascular surgery     > 80%       EDV > 120 cm/s    I have personally reviewed the examination and initial interpretation  and I agree with the findings.    ALEK MACIEL MD         SYSTEM ID:  [id number]   Transesophageal Echocardiogram   Result Value    LVEF  55-60%    Narrative    [id number]  formerly [facility]  [id number]  [id number]^TYRA^CONCEPCION^EMELYN     [facility]  Echocardiography Laboratory  [address] [place] [id number]     Name: FIDELIA MIDDLETON  MRN: [id number]  : 1960  Study Date: 2022 11:42 AM  Age: 61 yrs  Gender: Female  Patient Location: [facility]  Reason For Study: Tricuspid Regurgitation  Ordering Physician: CONCEPCION MARY  Performed By: Daphne Ralph     BSA: 1.5 m2  Height: 57 in  Weight: 122 lb  HR: 51  BP: 120/87 mmHg  Attestation  I was present during BRE probe placement by the fellow, Daphne Ralph. I  personally viewed the imaging and agree with the interpretation and report as  documented by the fellow.  ______________________________________________________________________________  Interpretation Summary  Global and regional left  ventricular function is normal with an EF of 55-60%.  Mild to moderate right ventricular dilation is present. Right ventricular  function is normal.  Severe tricuspid insufficiency is present due to failure of the valve leaflets  (septal-anterior) to coapt. There is no obvious leaflet tethering due to the  pacemaker lead. There are no valvular vegetations present.  No pericardial effusion is present.  ______________________________________________________________________________  Procedure  Transesophageal Echocardiogram with color and spectral Doppler performed. I  was present during BRE probe placement by the Fellow. I personally viewed the  imaging and agree with the interpretation and report as documented by the  Fellow. 3D image acquisition, reconstruction, and real-time interpretation was  performed. Procedure location Echo Lab. Informed consent for Transesophegeal  echo obtained. BRE Probe #loaner was used during the procedure. Patient was  sedated using Fentanyl 50 mcg. Patient was sedated using Versed 2 mg. The  heart rate, respiratory rate, oxygen saturations, blood pressure, and response  to care were monitored throughout the procedure with the assistance of the  nurse. I determined this patient to be an appropriate candidate for the  planned sedation and procedure and have reassessed the patient immediately  prior to sedation and procedure. Total sedation time: 26 minutes of continuous  bedside 1:1 monitoring. The Transducer was inserted without difficulty . The  patient tolerated the procedure well. Complications None. The patient's rhythm  is paced. Good quality two-dimensional was performed and interpreted.     Left Ventricle  Left ventricular size is normal. Global and regional left ventricular function  is normal with an EF of 55-60%.     Right Ventricle  Global right ventricular function is normal. Mild to moderate right  ventricular dilation is present. A pacemaker lead is noted in the  right  ventricle.     Atria  The left atrial appendage is normal. It is free of spontaneous echo contrast  and thrombus. Severe right atrial enlargement is present. Severe left atrial  enlargement is present. The atrial septum is intact as assessed by color  Doppler . Septal bowing into LA, indicative of severely elevated right sided  pressures. A pacemaker lead is noted in the right atrium.     Mitral Valve  Trace to mild mitral insufficiency is present.     Aortic Valve  The aortic valve is tricuspid.     Tricuspid Valve  Tricuspid valve fails to coapt. There is failure of coaptation of the septal  and anterior leaflets. Severe tricuspid insufficiency is present. no tricuspid  stenosis.     Pulmonic Valve  The pulmonic valve is normal.     Vessels  Mild ascending aorta atherosclerosis. Dilated superior vena cava. Dilation of  the inferior vena cava is present with abnormal respiratory variation in  diameter.     Pericardium  No pericardial effusion is present.     ______________________________________________________________________________  Report approved by: Milly Olguin 11/22/2022 02:19 PM     ______________________________________________________________________________          Time Spent on this Encounter   I spent 35 minutes on the unit/floor managing the care of Amelia Michel. Over 50% of my time was spent on the following:   - Counseling the patient and/or family regarding: prognosis, risks and benefits of treatment options and medical compliance  - Coordination of care with the: consultant(s)      ELIZABETH Izquierdo CNP

## 2022-11-23 NOTE — PLAN OF CARE
RHC Monday. Dental CT done. VSS, denies pain. Occasional v-paced rhythm with ectopy underlying a-fib, C1 aware. IV Lasix changed to oral Demadex. CVTS consulting for future surgical plan dependent on catheterization findings. Potassium replaced orally with AM Mag re-check.  visiting. PT / OT consult. Continuing to monitor.

## 2022-11-23 NOTE — PROGRESS NOTES
11/23/22 1418   Appointment Info   Signing Clinician's Name / Credentials (OT) MAKSIM Buchanan   Student Supervision On-site supervision provided;Therapy services provided with the co-signing licensed therapist guiding and directing the services, and providing the skilled judgement and assessment throughout the session   Living Environment   People in Home spouse   Current Living Arrangements house   Home Accessibility stairs to enter home   Number of Stairs, Main Entrance 3   Stair Railings, Main Entrance railings safe and in good condition   Transportation Anticipated car, drives self;family or friend will provide   Living Environment Comments Pt lives in a rambler style house with , all needs met on main level. Bathrooms have walk-in showers, grab bars, benches in shower, and tall toilets.   Self-Care   Usual Activity Tolerance moderate   Current Activity Tolerance fair   Regular Exercise No   Equipment Currently Used at Home walker, rolling  (Pt has 4WW at home for when she needs it but reports she doesn't currently use it on a regular basis.)   Fall history within last six months no   Activity/Exercise/Self-Care Comment Pt reports she used to walk for exercise but doesn't recently because she feels unsteady.   Instrumental Activities of Daily Living (IADL)   Previous Responsibilities meal prep;housekeeping;laundry;shopping;medication management;driving   IADL Comments Pt responsible for shopping and cooking, herself and her  share responsibility for cleaning and laundry. Does her own medication management,  does financial management.   General Information   Onset of Illness/Injury or Date of Surgery 11/19/22   Referring Physician Gema Gonzalez, APRN CNP   Patient/Family Therapy Goal Statement (OT) INC endurance and return to PLOF   Additional Occupational Profile Info/Pertinent History of Current Problem 61 year old female with a PMH significant for HTN, chronic diastolic heart  failure, atrial tacchyarrhythmias (on Eliquis), cardiac arrest (2017), SSS s/p PPM (12/2019), RA, and DLBCL who is admitted for heart failure exacerbation.   Existing Precautions/Restrictions fall   Left Upper Extremity (Weight-bearing Status) full weight-bearing (FWB)   Right Upper Extremity (Weight-bearing Status) full weight-bearing (FWB)   Left Lower Extremity (Weight-bearing Status) full weight-bearing (FWB)   Right Lower Extremity (Weight-bearing Status) full weight-bearing (FWB)   Cognitive Status Examination   Cognitive Status Comments Pt reports slow cognition and STM deficits.   Visual Perception   Visual Impairment/Limitations near/reading vision impaired;distance vision impaired;corrective lenses for distance;corrective lenses for reading   Sensory   Sensory Comments Pt reports neuropathy in feet and hands.   Range of Motion Comprehensive   General Range of Motion bilateral upper extremity ROM WFL   Strength Comprehensive (MMT)   General Manual Muscle Testing (MMT) Assessment no strength deficits identified   Bed Mobility   Bed Mobility No deficits identified   Transfers   Transfers No deficits identified   Bathing Assessment/Intervention   [place] Level (Bathing) modified independence   Comment, (Bathing) Per therapist judgement   Lower Body Dressing Assessment/Training   [place] Level (Lower Body Dressing) supervision   Comment, (Lower Body Dressing) Pt SBA doffing/donning socks and shoes   Grooming Assessment/Training   [place] Level (Grooming) supervision   Comment, (Grooming) Pt SBA for standing g/h at sink   Toileting   [place] Level (Toileting) supervision   Comment, (Toileting) Pt SBA for toileting and pericares   Clinical Impression   Criteria for Skilled Therapeutic Interventions Met (OT) Yes, treatment indicated   OT Diagnosis Decreased IND with ADLs due to deconditioning.   OT Problem List-Impairments impacting ADL problems related to;activity tolerance  impaired;mobility;strength   Assessment of Occupational Performance 1-3 Performance Deficits   Identified Performance Deficits Functional endurance, activity tolerance, ADLs   Planned Therapy Interventions (OT) ADL retraining;strengthening;progressive activity/exercise   Clinical Decision Making Complexity (OT) low complexity   Risk & Benefits of therapy have been explained evaluation/treatment results reviewed;care plan/treatment goals reviewed;patient   OT Total Evaluation Time   OT Eval, Low Complexity Minutes (44266) 10   OT Goals   Therapy Frequency (OT) 5 times/wk   OT Predicted Duration/Target Date for Goal Attainment 12/08/22   OT: Hygiene/Grooming independent   OT: Lower Body Dressing Modified independent   OT: Lower Body Bathing Modified independent   OT: Toilet Transfer/Toileting Modified independent   OT: Perform aerobic activity with stable cardiovascular response continuous;20 minutes;ambulation   OT: Functional/aerobic ambulation tolerance with stable cardiovascular response in order to return to home and community environment Supervision/SBA;Greater than 300 feet   OT: Navigation of stairs simulating home set up with stable cardiovascular response in order to return to home and community environment Supervision/SBA;3 stairs                                                            OT Discharge Planning   OT Plan OT: Progress functional endurance, FB dressing, pt requisting crosswords/word searches for cognition   OT Discharge Recommendation (DC Rec) home with assist;home with outpatient cardiac rehab   OT Rationale for DC Rec Pt currently below functional baseline for ADLs and functional mobility. Currently SBA for transfers and ambulation. Anticipate pt will be able to d/c home with assist for IADLs as needed and OP CR.   OT Brief overview of current status SBA for transfers and hallway ambulation   Total Session Time   Timed Code Treatment Minutes 29   Total Session Time (sum of timed and untimed  services) 39

## 2022-11-23 NOTE — PLAN OF CARE
NEURO: alert and oriented x4.   RESPIRATORY: LS diminished in bases. SpO2>92%on RA.   CARDIO: V paced - afib underlying. Afebrile. Pt had BRE and CTA US.   GI/: BS active. Voiding frequently due to diuretics. Fair appetite.   SKIN:Intact.   ACTIVITY: Up ad emelia.   PAIN:  Reported generalized aches, declined pain medication when offered.   LINES: PIV saline locked.   PLAN:  Right heart cath tomorrow

## 2022-11-23 NOTE — PROVIDER NOTIFICATION
Cardiology on call paged and updated on pt with very frequent ectopy.  Patient in bigeminal rhythm.  Occasional couplets and triplets.  14 beat run of VT at 0451.  VSS.  Patient denies symptoms.  Awaiting response from team.  Will continue to monitor.

## 2022-11-23 NOTE — PLAN OF CARE
Hours of Care:  1900 - 0700    Temp:  [96.8  F (36  C)-98.3  F (36.8  C)] 98.3  F (36.8  C)  Pulse:  [59-89] 73  Resp:  [10-18] 16  BP: ()/(57-95) 102/71  SpO2:  [95 %-100 %] 100 %     D: Amelia Michel is a 61 year old female with a PMH significant for HTN, chronic diastolic heart failure, atrial tacchyarrhythmias (on Eliquis), cardiac arrest (2017), SSS s/p PPM (12/2019), RA, and DLBCL who is admitted for heart failure exacerbation.      I: Monitored vitals and assessed pt status.      Tele:  A Fib, Frequent multifocal PVC, non-sustained bigeminy, trigeminy, quadgeminy.  15 run of what appears to be VT at 0451.    O2: Room air  Mobility: Independent     A: Neuro: A/O x4.  Call light appropriate.  Able to make needs known.  Respiratory:  On room air.  Lung sounds clear.  Denies shortness of breath at rest. Receiving 80 mg lasix IVP BID  Cardiac: VSS.  Paced rhythm with intermittent aberrant atrial rhythm.  Frequent multifocal PVCs. Murmur appreciated.  Receives apixaban BID.  No s/s of bleeding.  GI: Last BM 11/22.  No report of nausea or vomiting.  : Urinating adequate amounts of clear, anselmo urine  Skin:  See PCS for assessment and treatment of wounds and surgical incisions.  LDA:  22 G PIV RA  Electrolytes: RN managed Mg and K.  Lab pending.  Pain: Denies  Isolation:  Contact Precautions VRE ESBL  Tests/procedures: EKG.  Diet: NPO after midnight for left and right heart cath.  Otherwise, 2 g Na.  2000 mL fluid restriction     P: Continue to monitor Pt status and report changes to Cards 1.  Plan for right and left heart cath today.      Intake/Output Summary (Last 24 hours) at 11/23/2022 0536  Last data filed at 11/23/2022 0400  Gross per 24 hour   Intake 320 ml   Output 2250 ml   Net -1930 ml

## 2022-11-24 LAB
ANION GAP SERPL CALCULATED.3IONS-SCNC: 17 MMOL/L (ref 7–15)
BUN SERPL-MCNC: 32.8 MG/DL (ref 8–23)
CALCIUM SERPL-MCNC: 8.8 MG/DL (ref 8.8–10.2)
CHLORIDE SERPL-SCNC: 92 MMOL/L (ref 98–107)
CREAT SERPL-MCNC: 1.33 MG/DL (ref 0.51–1.17)
DEPRECATED HCO3 PLAS-SCNC: 22 MMOL/L (ref 22–29)
GFR SERPL CREATININE-BSD FRML MDRD: 45 ML/MIN/1.73M2
GLUCOSE SERPL-MCNC: 83 MG/DL (ref 70–99)
MAGNESIUM SERPL-MCNC: 2 MG/DL (ref 1.7–2.3)
POTASSIUM SERPL-SCNC: 3.7 MMOL/L (ref 3.4–5.3)
SODIUM SERPL-SCNC: 131 MMOL/L (ref 136–145)

## 2022-11-24 PROCEDURE — 250N000013 HC RX MED GY IP 250 OP 250 PS 637: Performed by: INTERNAL MEDICINE

## 2022-11-24 PROCEDURE — 36415 COLL VENOUS BLD VENIPUNCTURE: CPT

## 2022-11-24 PROCEDURE — 250N000011 HC RX IP 250 OP 636

## 2022-11-24 PROCEDURE — 214N000001 HC R&B CCU UMMC

## 2022-11-24 PROCEDURE — 83735 ASSAY OF MAGNESIUM: CPT | Performed by: INTERNAL MEDICINE

## 2022-11-24 PROCEDURE — 250N000012 HC RX MED GY IP 250 OP 636 PS 637

## 2022-11-24 PROCEDURE — 99233 SBSQ HOSP IP/OBS HIGH 50: CPT | Performed by: INTERNAL MEDICINE

## 2022-11-24 PROCEDURE — 80048 BASIC METABOLIC PNL TOTAL CA: CPT

## 2022-11-24 PROCEDURE — 250N000013 HC RX MED GY IP 250 OP 250 PS 637

## 2022-11-24 RX ORDER — MAGNESIUM OXIDE 400 MG/1
400 TABLET ORAL EVERY 4 HOURS
Status: COMPLETED | OUTPATIENT
Start: 2022-11-24 | End: 2022-11-24

## 2022-11-24 RX ORDER — POTASSIUM CHLORIDE 750 MG/1
20 TABLET, EXTENDED RELEASE ORAL ONCE
Status: COMPLETED | OUTPATIENT
Start: 2022-11-24 | End: 2022-11-24

## 2022-11-24 RX ADMIN — TORSEMIDE 40 MG: 20 TABLET ORAL at 07:45

## 2022-11-24 RX ADMIN — MAGNESIUM OXIDE TAB 400 MG (241.3 MG ELEMENTAL MG) 400 MG: 400 (241.3 MG) TAB at 13:32

## 2022-11-24 RX ADMIN — Medication 200 MG: at 21:10

## 2022-11-24 RX ADMIN — GABAPENTIN 100 MG: 100 CAPSULE ORAL at 22:41

## 2022-11-24 RX ADMIN — POTASSIUM CHLORIDE 20 MEQ: 750 TABLET, EXTENDED RELEASE ORAL at 07:45

## 2022-11-24 RX ADMIN — EMPAGLIFLOZIN 10 MG: 10 TABLET, FILM COATED ORAL at 07:45

## 2022-11-24 RX ADMIN — Medication 3 MG: at 07:44

## 2022-11-24 RX ADMIN — ATENOLOL 50 MG: 25 TABLET ORAL at 07:45

## 2022-11-24 RX ADMIN — POTASSIUM CHLORIDE 20 MEQ: 750 TABLET, EXTENDED RELEASE ORAL at 10:28

## 2022-11-24 RX ADMIN — ACETAMINOPHEN 650 MG: 325 TABLET, FILM COATED ORAL at 22:41

## 2022-11-24 RX ADMIN — HYDROXYCHLOROQUINE SULFATE 200 MG: 200 TABLET, FILM COATED ORAL at 21:10

## 2022-11-24 RX ADMIN — MAGNESIUM OXIDE TAB 400 MG (241.3 MG ELEMENTAL MG) 400 MG: 400 (241.3 MG) TAB at 10:28

## 2022-11-24 RX ADMIN — GABAPENTIN 100 MG: 100 CAPSULE ORAL at 03:47

## 2022-11-24 RX ADMIN — GABAPENTIN 100 MG: 100 CAPSULE ORAL at 15:39

## 2022-11-24 RX ADMIN — TORSEMIDE 40 MG: 20 TABLET ORAL at 15:39

## 2022-11-24 RX ADMIN — APIXABAN 5 MG: 5 TABLET, FILM COATED ORAL at 07:45

## 2022-11-24 RX ADMIN — APIXABAN 5 MG: 5 TABLET, FILM COATED ORAL at 21:10

## 2022-11-24 RX ADMIN — LORATADINE 10 MG: 10 TABLET ORAL at 07:41

## 2022-11-24 RX ADMIN — PROCHLORPERAZINE EDISYLATE 10 MG: 5 INJECTION INTRAMUSCULAR; INTRAVENOUS at 21:09

## 2022-11-24 RX ADMIN — SPIRONOLACTONE 50 MG: 25 TABLET ORAL at 07:45

## 2022-11-24 RX ADMIN — GABAPENTIN 100 MG: 100 CAPSULE ORAL at 09:55

## 2022-11-24 RX ADMIN — POTASSIUM CHLORIDE 20 MEQ: 750 TABLET, EXTENDED RELEASE ORAL at 18:14

## 2022-11-24 RX ADMIN — ATENOLOL 50 MG: 25 TABLET ORAL at 21:10

## 2022-11-24 ASSESSMENT — ACTIVITIES OF DAILY LIVING (ADL)
ADLS_ACUITY_SCORE: 27

## 2022-11-24 NOTE — PROGRESS NOTES
[facility]   Cardiology   Progress Note     ASSESSMENT/PLAN:  Amelia Michel is a 61 year old female with a PMH significant for HTN, chronic diastolic heart failure, atrial tacchyarrhythmias (on Eliquis), cardiac arrest (2017), SSS s/p PPM (12/2019), RA, and DLBCL who is admitted for heart failure exacerbation.     Interval History:   Reviewed events from last 24 hours.  No acute events overnight.  No new symptoms.    Changes Today:  - Pending Dental CT consult per CVTS request   - Pending PT eval  - Hold anticoagulation on Sunday for coronary angiogram on Monday    # Acute on Chronic Diastolic Heart Failure (EF 50%) NYHA Class III, AHA/ACC Stage C  # Severe Tricuspid Regurgitation   # Hypertension  Patient reporting MONTALVO, BLE edema, and 8 pound weight gain in the last week. She has been taking her home Lasix 20mg daily as prescribed. Chest X-ray suspicious for pulmonary edema. NT-P BNP 1,913. Troponin T 21-->12. No chest pain reported. EDW per chart review 116-120; admission weight 128. Denies fever, cough, PND, or orthopnea. She admits symptoms are similar to previous CHF exacerbations she has had in the past. She follows in our [facility] clinic.    Per Dr. Andrea's read of TTE 8/2022 and by clinical exam, TR is clearly severe and accounts in part for predominant presentation of clinical right HF. Echocardiogram with severe TR and EF   - Fluid Status: Euvolemic; PO Torsemide 40mg BID  - Should discharge on PO Torsemide for severe TR  - Continue Jardiance 10mg daily   - Continue PTA Spironolactone 50mg daily  - Continue PTA Atenolol 50mg BID  - Daily standing weights  - Strict I&O's  - Low Na Diet / 2L Fluid restriction  - Daily BMP; Keep K+ > 4.0, Mg > 2.0. Protocols ordered.   - CVTS consult; appreciate ongoing recommendations  - Plan for right heart cath and coronary angiogram Monday; NPO ordered.      # SSS s/p PPM (2019)  # Atrial Tachyarrhythmias  Cardiac history dates back to  "May 2017 with atrial tachyarrhythmias and EF of 40-45% at that time. Patient feels as if her pacemaker has been \"firing more\" than her baseline. Device check report showing 11,136 episodes of \"NSVT\" since 9/25/22 although EGM's reveal irregular v-v intervals with similar morphology to intrinsic rhythm indicating probable AF w/ RVR. [id number] = 2 (+ htn, + gender). 14 beat run of NSVT on 11/23 @ 0451; asymptomatic.  - Telemetry  - EP consulted and recommended discontinuing digoxin   - PRN EKG's   - Discontinue PTA Digoxin per EP   - Continue PTA Eliquis 5mg BID; end date of 11/25 PM entered for holding for cath Monday  - Continue PTA Atenolol 50mg BID      # Chronic Hyponatremia  # Hypokalemia  Chronically low sodium since April of 2022. Could be related to hypervolemic state.   - Diuresis per above  - Daily BMP   - Continue Potassium 20mEq BID      # Rheumatoid Arthritis  - Continue PTA Prednisone 3mg daily  - Continue PTA Hydroxychloroquine 200mg daily      # Hx of Diffuse Large B-Cell Lymphoma  # Hx of Cardiac Arrest (2017)  Underwent hospitalization 6/2017 due to VF arrest in setting of normal angiogram and found to have DLBCL complicated by masses on the coronary cusps and LVOT. She underwent R-EPOCH one cycle and 5 subsequent cycles of R-CHOP completed in 1220/17 with her course complicated by pericardial effusion treated with colcichine.   - CBC every 3 days     FEN: Low Na / 2L Fluid Restriction   Code Status: Full Code  Prophylaxis:  PTA Eliquis; Holding   Isolation: Contact  Disposition: 1-3 days pending work up       Patient seen and discussed with Dr. Reyes, who agrees with above plan.    Gema Gonzalez APRTARUN, CNP  [facility] Cardiology Team    Physical Exam:  Temp:  [97.4  F (36.3  C)-97.7  F (36.5  C)] 97.4  F (36.3  C)  Pulse:  [] 95  Resp:  [16-18] 17  BP: ()/(50-93) 110/93  SpO2:  [97 %-99 %] 99 %    I/O:   Intake/Output Summary (Last 24 hours) at 11/23/2022 0859  Last data filed at " 11/23/2022 0400  Gross per 24 hour   Intake 320 ml   Output 2100 ml   Net -1780 ml       Wt:   Wt Readings from Last 5 Encounters:   11/24/22 52 kg (114 lb 11.2 oz)   11/03/22 55.8 kg (123 lb)   09/16/22 54.7 kg (120 lb 8 oz)   09/14/22 54.9 kg (121 lb)   08/26/22 55.5 kg (122 lb 6.4 oz)     General: NAD  HEENT:  PERRLA, EOMI.   Neck: JVD 1/3 way up neck.   CV: RRR. No murmur appreciated. No rubs or gallops. Peripheral radial pulse intact.  Resp: No increased work of breathing or use of accessory muscles, breathing comfortably on room air.  Lung sounds clear throughout/bilaterally  Abdomen:  Normal active bowel sounds.  Abdomen is soft. No distension, non-tender to palpation.   Extremities: Warm. Capillary refill less than 3 sec. 3/4 radial pulses bilaterally.  3/4 pedal pulses bilaterally. BLE edema. No cyanosis or clubbing.  Skin:  Warm and dry. No erythema, rashes, ulceration or diaphoresis.  Neuro: Alert and oriented x3.       Medications:    apixaban ANTICOAGULANT  5 mg Oral BID     atenolol  50 mg Oral BID     empagliflozin  10 mg Oral Daily     fentaNYL  50 mcg Intravenous Once     gabapentin  100 mg Oral Q6H     hydroxychloroquine  200 mg Oral Daily     loratadine  10 mg Oral Daily     magnesium oxide  200 mg Oral Daily     magnesium oxide  400 mg Oral Q4H     potassium chloride  20 mEq Oral BID     predniSONE  3 mg Oral Daily     sodium chloride (PF)  3 mL Intracatheter Q8H     sodium chloride (PF)  3 mL Intracatheter Q8H     spironolactone  50 mg Oral Daily     torsemide  40 mg Oral BID       - MEDICATION INSTRUCTIONS -       - MEDICATION INSTRUCTIONS -         Labs:   CMP  Recent Labs   Lab 11/24/22  0648 11/23/22  0542 11/22/22  0732 11/21/22  0748 11/19/22  1347 11/19/22  1345   * 129* 131* 129*   < > 125*   POTASSIUM 3.7 3.8 4.0 3.9   < > 4.9   CHLORIDE 92* 92* 93* 93*   < > 89*   CO2 22 25 23 23   < > 20*   ANIONGAP 17* 12 15 13   < > 16*   GLC 83 85 91 70   < > 86   BUN 32.8* 31.3* 28.9* 30.0*    < > 31.4*   CR 1.33* 1.29* 1.32* 1.21*   < > 1.19*   GFRESTIMATED 45* 47* 46* 51*   < > 52*   VIKTORIYA 8.8 8.4* 9.1 9.0   < > 9.8   MAG 2.0 2.2 2.4* 1.9   < > 2.1   PROTTOTAL  --   --   --   --   --  7.6   ALBUMIN  --   --   --   --   --  4.3   BILITOTAL  --   --   --   --   --  2.8*   ALKPHOS  --   --   --   --   --  125   AST  --   --   --   --   --  125*   ALT  --   --   --   --   --  87*    < > = values in this interval not displayed.     CBC  Recent Labs   Lab 22  0542 22  0602 22  1347 22  1316 22  1314   WBC 6.1 5.6  --   --  5.9   RBC 4.10 4.02  --   --  4.52   HGB 14.8 14.4 18.0* 17.3 16.1   HCT 42.3 41.8 53 51 46.8   * 104*  --   --  104*   MCH 36.1* 35.8*  --   --  35.6*   MCHC 35.0 34.4  --   --  34.4   RDW 16.3* 15.7*  --   --  15.5*   PLT 90* 98*  --   --  106*     INR  Recent Labs   Lab 22  1314   INR 4.57*     Arterial Blood GasNo lab results found in last 7 days.    Diagnostics:  22 EC/22/22 Transesophageal Echo:  Global and regional left ventricular function is normal with an EF of 55-60%.  Mild to moderate right ventricular dilation is present. Right ventricular  function is normal.  Severe tricuspid insufficiency is present due to failure of the valve leaflets  (septal-anterior) to coapt. There is no obvious leaflet tethering due to the  pacemaker lead. There are no valvular vegetations present.  No pericardial effusion is present.

## 2022-11-24 NOTE — CONSULTS
Dental Service Clearance Letter    Amelia Michel is cleared for her surgical procedure from a dental standpoint. There is no sign of active infections after clinical & radiographic imaging. She has been given oral hygiene instructions and oral health management products to help with any potential adverse effects of her future treatments. We will provide her with follow-up care to assist with any concerns that may arise.    Thank you for allowing us to participate in the comprehensive care of Amelia Michel.    For any questions or concerns, please contact me or the dental care coordinator ([phone]).      Isai Martinez DMD  PGY-1  Pager: [phone]

## 2022-11-24 NOTE — PROGRESS NOTES
Print-out of cardiac monitor given to Cards 1 MD. Aberrant conduction (PVC's) vs. Runs of V-tach. Patient not symptomatic. Continuing to monitor.

## 2022-11-24 NOTE — PLAN OF CARE
Neuro: A&Ox4.   Cardiac: Afebrile, VSS.  Paced rhythm with intermittent aberrant atrial rhythm.  Frequent multifocal PVCs.   Respiratory: RA, lung sounds clear, c/o increased SOB this afternoon. Provider notified.  GI/: Voiding spontaneously. No BM this shift.  Diet/appetite: Tolerating 2g Na 2L FR diet. Denies nausea. Appetite is poor.  Activity: Up independently   Pain: Denies   Skin: No new deficits noted.  Lines: R PIV, SL  Replacements: Mag and K+ replaced today, rechecks in the AM    Plan: Continue with current POC. Report changes to primary team.

## 2022-11-24 NOTE — PLAN OF CARE
RHC Monday. VSS, denies pain. Occasional v-paced a-fib, C1 aware of increased aberrant conduction and frequent PVC's, asymptomatic. Potassium and magnesium orally replaced with AM re-checks. BM on shift; good UOP.  visiting. Continuing to monitor.

## 2022-11-24 NOTE — PLAN OF CARE
Admitted for heart failure exacerbation. PMHx of HTN, chronic diastolic heart failure, atrial tacchyarrhythmias (on Eliquis), cardiac arrest (2017), SSS s/p PPM (12/2019), RA, and DLBCL     Code status: Full Code     Team: Cards 1     Neuro: AOx4, Clam and cooperative, no neurological deficits  Cardiac/Tele/VS: Irregular tachyarrhythmias (Abberant Afib with frequent PVC's with HR fluctuating between 80 -170's). Pt reports fatigue due to irregular rhythm. Denies CP. Murmur heard on ascultation. Normotensive, afebrile. Other VSS  Respiratory: Reports MONTALVO. No SOB noted at rest. LS clear and equal bilaterally. Room air, O2 sats > 92%.   GI/: Voids spontaneously. Uses the bedside commode. No BM this shift. LBM 11/23.  Diet/Appetite: 2g Na diet with 2L FR  Skin: Dusky bilateral feet. No other overt skin deficits  Endocrine/Electrolytes: No BG checks. Potassium replaced at beginning of shift with scheduled dose.  LDAs: PIV saline locked  Activity: Up ad emelia   Pain: Reported chronic arm pain that radiated down to her thumb that resolved. Declined any pain meds for it. Tylenol given x1     Plan: Continue POC. Notify team of concerns

## 2022-11-25 ENCOUNTER — APPOINTMENT (OUTPATIENT)
Dept: OCCUPATIONAL THERAPY | Facility: CLINIC | Age: 62
DRG: 273 | End: 2022-11-25
Payer: COMMERCIAL

## 2022-11-25 LAB
ANION GAP SERPL CALCULATED.3IONS-SCNC: 24 MMOL/L (ref 7–15)
BUN SERPL-MCNC: 36.4 MG/DL (ref 8–23)
CALCIUM SERPL-MCNC: 9.4 MG/DL (ref 8.8–10.2)
CHLORIDE SERPL-SCNC: 96 MMOL/L (ref 98–107)
CREAT SERPL-MCNC: 1.49 MG/DL (ref 0.51–1.17)
DEPRECATED HCO3 PLAS-SCNC: 13 MMOL/L (ref 22–29)
GFR SERPL CREATININE-BSD FRML MDRD: 39 ML/MIN/1.73M2
GLUCOSE SERPL-MCNC: 91 MG/DL (ref 70–99)
HOLD SPECIMEN: NORMAL
MAGNESIUM SERPL-MCNC: 2.1 MG/DL (ref 1.7–2.3)
POTASSIUM SERPL-SCNC: 4 MMOL/L (ref 3.4–5.3)
SODIUM SERPL-SCNC: 133 MMOL/L (ref 136–145)

## 2022-11-25 PROCEDURE — 250N000013 HC RX MED GY IP 250 OP 250 PS 637: Performed by: INTERNAL MEDICINE

## 2022-11-25 PROCEDURE — 97110 THERAPEUTIC EXERCISES: CPT | Mod: GO

## 2022-11-25 PROCEDURE — 93010 ELECTROCARDIOGRAM REPORT: CPT | Performed by: INTERNAL MEDICINE

## 2022-11-25 PROCEDURE — 82374 ASSAY BLOOD CARBON DIOXIDE: CPT

## 2022-11-25 PROCEDURE — 97535 SELF CARE MNGMENT TRAINING: CPT | Mod: GO

## 2022-11-25 PROCEDURE — 250N000012 HC RX MED GY IP 250 OP 636 PS 637

## 2022-11-25 PROCEDURE — 82310 ASSAY OF CALCIUM: CPT

## 2022-11-25 PROCEDURE — 36415 COLL VENOUS BLD VENIPUNCTURE: CPT

## 2022-11-25 PROCEDURE — 214N000001 HC R&B CCU UMMC

## 2022-11-25 PROCEDURE — 99233 SBSQ HOSP IP/OBS HIGH 50: CPT | Performed by: NURSE PRACTITIONER

## 2022-11-25 PROCEDURE — 93005 ELECTROCARDIOGRAM TRACING: CPT

## 2022-11-25 PROCEDURE — 250N000013 HC RX MED GY IP 250 OP 250 PS 637

## 2022-11-25 PROCEDURE — 83735 ASSAY OF MAGNESIUM: CPT | Performed by: INTERNAL MEDICINE

## 2022-11-25 RX ADMIN — ATENOLOL 50 MG: 25 TABLET ORAL at 08:39

## 2022-11-25 RX ADMIN — APIXABAN 5 MG: 5 TABLET, FILM COATED ORAL at 08:39

## 2022-11-25 RX ADMIN — POTASSIUM CHLORIDE 20 MEQ: 750 TABLET, EXTENDED RELEASE ORAL at 06:20

## 2022-11-25 RX ADMIN — Medication 200 MG: at 20:31

## 2022-11-25 RX ADMIN — HYDROXYCHLOROQUINE SULFATE 200 MG: 200 TABLET, FILM COATED ORAL at 20:31

## 2022-11-25 RX ADMIN — GABAPENTIN 100 MG: 100 CAPSULE ORAL at 16:35

## 2022-11-25 RX ADMIN — TORSEMIDE 40 MG: 20 TABLET ORAL at 16:35

## 2022-11-25 RX ADMIN — GABAPENTIN 100 MG: 100 CAPSULE ORAL at 22:44

## 2022-11-25 RX ADMIN — EMPAGLIFLOZIN 10 MG: 10 TABLET, FILM COATED ORAL at 08:41

## 2022-11-25 RX ADMIN — POTASSIUM CHLORIDE 20 MEQ: 750 TABLET, EXTENDED RELEASE ORAL at 17:50

## 2022-11-25 RX ADMIN — SPIRONOLACTONE 50 MG: 25 TABLET ORAL at 08:39

## 2022-11-25 RX ADMIN — GABAPENTIN 100 MG: 100 CAPSULE ORAL at 11:21

## 2022-11-25 RX ADMIN — TORSEMIDE 40 MG: 20 TABLET ORAL at 08:39

## 2022-11-25 RX ADMIN — LORATADINE 10 MG: 10 TABLET ORAL at 08:40

## 2022-11-25 RX ADMIN — APIXABAN 5 MG: 5 TABLET, FILM COATED ORAL at 20:31

## 2022-11-25 RX ADMIN — ATENOLOL 50 MG: 25 TABLET ORAL at 20:31

## 2022-11-25 RX ADMIN — GABAPENTIN 100 MG: 100 CAPSULE ORAL at 04:51

## 2022-11-25 RX ADMIN — Medication 3 MG: at 08:40

## 2022-11-25 ASSESSMENT — ACTIVITIES OF DAILY LIVING (ADL)
ADLS_ACUITY_SCORE: 27

## 2022-11-25 NOTE — PROGRESS NOTES
"    [facility]   Cardiology   Progress Note     ASSESSMENT/PLAN:  Amelia Michel is a 62 year old year old adult with PMH of significant for HTN, chronic diastolic heart failure, atrial tacchyarrhythmias (on Eliquis), cardiac arrest (2017), SSS s/p PPM (12/2019), RA, and DLBCL who is admitted for heart failure exacerbation.      # Acute on Chronic Diastolic Heart Failure (EF 50%) NYHA Class III, AHA/ACC Stage C  # Severe Tricuspid Regurgitation   # Hypertension  Patient reporting MONTALVO, BLE edema, and 8 pound weight gain in the last week. She has been taking her home Lasix 20mg daily as prescribed. Chest X-ray suspicious for pulmonary edema. NT-P BNP 1,913. Troponin T 21-->12. No chest pain reported. EDW per chart review 116-120; admission weight 128. Denies fever, cough, PND, or orthopnea. She admits symptoms are similar to previous CHF exacerbations she has had in the past. She follows in our [facility] clinic. Echocardiogram with severe TR.     - Fluid Status: Euvolemic; PO Torsemide 40mg BID (plan to discharge on Torsemide)  - SGLT2i: Jardiance 10mg daily   - AA: Spironolactone 50mg daily  - CVTS consulted for TV replacement/repair, timing TBD based on work up  - Plan for right heart cath and coronary angiogram Monday; NPO ordered.   - Daily standing weights, Strict I&O's  - Low Na Diet / 2L Fluid restriction  - Daily BMP; Keep K+ > 4.0, Mg > 2.0. Protocols ordered.      # SSS s/p PPM (2019)  # Atrial Tachyarrhythmias  Cardiac history dates back to May 2017 with atrial tachyarrhythmias and EF of 40-45% at that time. Patient feels as if her pacemaker has been \"firing more\" than her baseline. Device check report showing 11,136 episodes of \"NSVT\" since 9/25/22 although EGM's reveal irregular v-v intervals with similar morphology to intrinsic rhythm indicating probable AF w/ RVR. [id number] = 2 (+ htn, + gender). 14 beat run of NSVT on 11/23 @ 0451; asymptomatic.  - EP consulted and recommended " discontinuing digoxin   - PRN EKG's   - Continue PTA Eliquis 5mg BID; end date of 11/25 PM entered for holding for cath Monday  - Continue PTA Atenolol 50mg BID      # Chronic Hyponatremia  # Hypokalemia, resolved  Chronically low sodium since April of 2022. Could be related to hypervolemic state.   - Na 131 (129)  - Diuresis per above  - Daily BMP   - Continue Potassium 20mEq BID      # Rheumatoid Arthritis  - Continue PTA Prednisone 3mg daily  - Continue PTA Hydroxychloroquine 200mg daily      # Hx of Diffuse Large B-Cell Lymphoma  # Hx of Cardiac Arrest (2017)  Underwent hospitalization 6/2017 due to VF arrest in setting of normal angiogram and found to have DLBCL complicated by masses on the coronary cusps and LVOT. She underwent R-EPOCH one cycle and 5 subsequent cycles of R-CHOP completed in 1220/17 with her course complicated by pericardial effusion treated with colcichine.   - CBC every 3 days    FEN: 2 gm Sodium, 2 L Fluid restriction  Code status: Full  Prophylaxis:  PO Eliquis  Isolation: Contact, hx VRE, ESBL  Disposition: discharge home pending surgery/recovery, pt to remain IP prior to surgery    Patient seen and discussed with Dr. Reyes, who agrees with above plan.    Lani JOHNSON, CNP  [facility] Cardiology Team  Pager [phone]    Interval History:  - no acute events overnight  - Pt on PO diuretics, net negative 1.2L overnight, Cr stable  - Pt reports feeling well today; she continues to endorse decreased exercise tolerance, has not ambulated much today  - She denies any SOB at rest, no orthopnea, PND    Physical Exam:  Temp:  [97.6  F (36.4  C)-98.1  F (36.7  C)] 97.6  F (36.4  C)  Pulse:  [] 63  Resp:  [17-18] 17  BP: ()/(51-86) 109/86  SpO2:  [95 %-98 %] 96 %    I/O:  Intake/Output Summary (Last 24 hours) at 11/25/2022 0758  Last data filed at 11/25/2022 0600  Gross per 24 hour   Intake 740 ml   Output 1950 ml   Net -1210 ml         Wt:   Wt Readings from Last 5 Encounters:    11/25/22 50.9 kg (112 lb 3.2 oz)   11/03/22 55.8 kg (123 lb)   09/16/22 54.7 kg (120 lb 8 oz)   09/14/22 54.9 kg (121 lb)   08/26/22 55.5 kg (122 lb 6.4 oz)       General: NAD  HEENT:  PERRLA, EOMI.   Neck: JVD elevated (2/2 severeTR)  CV: RRR. Murmur present, heard t/o. No rubs or gallops. Peripheral radial pulse intact.  Resp: No increased work of breathing or use of accessory muscles, breathing comfortably on room air.  Lung sounds clear throughout/bilaterally  Abdomen:  Normal active bowel sounds.  Abdomen is soft. No distension, non-tender to palpation.    Extremities: Warm. Capillary refill less than 3 sec. 2/4 radial pulses bilaterally.  2/4 pedal pulses bilaterally. No pre-tibial edema. No cyanosis or clubbing.  Skin:  Warm and dry. No erythema, rashes, ulceration or diaphoresis. BLE venous stasis, Left toe amp.   Neuro: Alert and oriented x3.      Medications:    apixaban ANTICOAGULANT  5 mg Oral BID     atenolol  50 mg Oral BID     empagliflozin  10 mg Oral Daily     gabapentin  100 mg Oral Q6H     hydroxychloroquine  200 mg Oral Daily     loratadine  10 mg Oral Daily     magnesium oxide  200 mg Oral Daily     potassium chloride  20 mEq Oral BID     predniSONE  3 mg Oral Daily     sodium chloride (PF)  3 mL Intracatheter Q8H     sodium chloride (PF)  3 mL Intracatheter Q8H     spironolactone  50 mg Oral Daily     torsemide  40 mg Oral BID       - MEDICATION INSTRUCTIONS -       - MEDICATION INSTRUCTIONS -         Labs:   [facility]  Recent Labs   Lab 11/24/22  0648 11/23/22  0542 11/22/22  0732 11/21/22  0748 11/19/22  1347 11/19/22  1345   * 129* 131* 129*   < > 125*   POTASSIUM 3.7 3.8 4.0 3.9   < > 4.9   CHLORIDE 92* 92* 93* 93*   < > 89*   CO2 22 25 23 23   < > 20*   ANIONGAP 17* 12 15 13   < > 16*   GLC 83 85 91 70   < > 86   BUN 32.8* 31.3* 28.9* 30.0*   < > 31.4*   CR 1.33* 1.29* 1.32* 1.21*   < > 1.19*   GFRESTIMATED 45* 47* 46* 51*   < > 52*   VIKTORIYA 8.8 8.4* 9.1 9.0   < > 9.8   MAG 2.0 2.2 2.4* 1.9    < > 2.1   PROTTOTAL  --   --   --   --   --  7.6   ALBUMIN  --   --   --   --   --  4.3   BILITOTAL  --   --   --   --   --  2.8*   ALKPHOS  --   --   --   --   --  125   AST  --   --   --   --   --  125*   ALT  --   --   --   --   --  87*    < > = values in this interval not displayed.     CBC  Recent Labs   Lab 22  0542 22  0602 22  1347 22  1316 22  1314   WBC 6.1 5.6  --   --  5.9   RBC 4.10 4.02  --   --  4.52   HGB 14.8 14.4 18.0* 17.3 16.1   HCT 42.3 41.8 53 51 46.8   * 104*  --   --  104*   MCH 36.1* 35.8*  --   --  35.6*   MCHC 35.0 34.4  --   --  34.4   RDW 16.3* 15.7*  --   --  15.5*   PLT 90* 98*  --   --  106*     INR  Recent Labs   Lab 22  1314   INR 4.57*     Arterial Blood GasNo lab results found in last 7 days.    Diagnostics:  22 EC/22/22 Transesophageal Echo:  Global and regional left ventricular function is normal with an EF of 55-60%.  Mild to moderate right ventricular dilation is present. Right ventricular  function is normal.  Severe tricuspid insufficiency is present due to failure of the valve leaflets  (septal-anterior) to coapt. There is no obvious leaflet tethering due to the  pacemaker lead. There are no valvular vegetations present.  No pericardial effusion is present.    No results found for this or any previous visit (from the past 24 hour(s)).    Time Spent on this Encounter   I spent 30 minutes on the unit/floor managing the care of Amelia Michel. Over 50% of my time was spent on the following:   - Counseling the patient and/or family regarding: diagnostic results, risks and benefits of treatment options and medical compliance  - Coordination of care with the: consultant(s) and nurse    Bettina Novak, CNP

## 2022-11-25 NOTE — PLAN OF CARE
Admitted for heart failure exacerbation. PMHx of HTN, chronic diastolic heart failure, atrial tacchyarrhythmias (on Eliquis), cardiac arrest (2017), SSS s/p PPM (12/2019), RA, and DLBCL      Code status: Full Code     Team: Cards 1     Neuro: AOx4, Clam and cooperative, no neurological deficits  Cardiac/Tele/VS: Irregular tachyarrhythmias (Increased Abberant conductions with frequent Vtach runs and PVC's with HR fluctuating between 50's-150's). Pt reports increased fatigue due to irregular rhythm past few days. Team aware. Denies CP. Murmur heard on ascultation. Normotensive, afebrile. Other VSS  Respiratory: Reports MONTALVO. No SOB noted at rest. LS clear and equal bilaterally. Room air, O2 sats > 92%.   GI/: Voids spontaneously. Uses the bedside commode. No BM this shift. LBM 11/24. Pt reported nausea at bedtime. IV compazine given with some relief.  Diet/Appetite: 2g Na diet with 2L FR  Skin: Dusky bilateral feet. Generalized bruising to forearms and upper arms. No other overt skin deficits  Endocrine/Electrolytes: No BG checks. Replaced Mag and Potassium during shift. Monitor AM labs  LDAs: PIV saline locked  Activity: Up ad emelia   Pain: Reported some discomfort. Tylenol given x1     Plan: Continue POC. Notify team of concerns

## 2022-11-26 ENCOUNTER — HEALTH MAINTENANCE LETTER (OUTPATIENT)
Age: 62
End: 2022-11-26

## 2022-11-26 LAB
ANION GAP SERPL CALCULATED.3IONS-SCNC: 17 MMOL/L (ref 7–15)
BUN SERPL-MCNC: 35.2 MG/DL (ref 8–23)
CALCIUM SERPL-MCNC: 9.5 MG/DL (ref 8.8–10.2)
CHLORIDE SERPL-SCNC: 95 MMOL/L (ref 98–107)
CREAT SERPL-MCNC: 1.41 MG/DL (ref 0.51–1.17)
DEPRECATED HCO3 PLAS-SCNC: 22 MMOL/L (ref 22–29)
ERYTHROCYTE [DISTWIDTH] IN BLOOD BY AUTOMATED COUNT: 16.3 % (ref 10–15)
GFR SERPL CREATININE-BSD FRML MDRD: 42 ML/MIN/1.73M2
GLUCOSE SERPL-MCNC: 79 MG/DL (ref 70–99)
HCT VFR BLD AUTO: 46 % (ref 35–53)
HGB BLD-MCNC: 16 G/DL (ref 11.7–17.7)
MAGNESIUM SERPL-MCNC: 2.1 MG/DL (ref 1.7–2.3)
MCH RBC QN AUTO: 36 PG (ref 26.5–33)
MCHC RBC AUTO-ENTMCNC: 34.8 G/DL (ref 31.5–36.5)
MCV RBC AUTO: 103 FL (ref 78–100)
PLATELET # BLD AUTO: 94 10E3/UL (ref 150–450)
POTASSIUM SERPL-SCNC: 3.6 MMOL/L (ref 3.4–5.3)
RBC # BLD AUTO: 4.45 10E6/UL (ref 3.8–5.9)
SODIUM SERPL-SCNC: 134 MMOL/L (ref 136–145)
WBC # BLD AUTO: 5 10E3/UL (ref 4–11)

## 2022-11-26 PROCEDURE — 250N000011 HC RX IP 250 OP 636: Performed by: STUDENT IN AN ORGANIZED HEALTH CARE EDUCATION/TRAINING PROGRAM

## 2022-11-26 PROCEDURE — 93010 ELECTROCARDIOGRAM REPORT: CPT | Performed by: INTERNAL MEDICINE

## 2022-11-26 PROCEDURE — 214N000001 HC R&B CCU UMMC

## 2022-11-26 PROCEDURE — 85027 COMPLETE CBC AUTOMATED: CPT

## 2022-11-26 PROCEDURE — 250N000013 HC RX MED GY IP 250 OP 250 PS 637

## 2022-11-26 PROCEDURE — 82310 ASSAY OF CALCIUM: CPT

## 2022-11-26 PROCEDURE — 250N000013 HC RX MED GY IP 250 OP 250 PS 637: Performed by: INTERNAL MEDICINE

## 2022-11-26 PROCEDURE — 36415 COLL VENOUS BLD VENIPUNCTURE: CPT

## 2022-11-26 PROCEDURE — 83735 ASSAY OF MAGNESIUM: CPT | Performed by: INTERNAL MEDICINE

## 2022-11-26 PROCEDURE — 99231 SBSQ HOSP IP/OBS SF/LOW 25: CPT | Performed by: INTERNAL MEDICINE

## 2022-11-26 PROCEDURE — 250N000012 HC RX MED GY IP 250 OP 636 PS 637

## 2022-11-26 PROCEDURE — 250N000009 HC RX 250: Performed by: STUDENT IN AN ORGANIZED HEALTH CARE EDUCATION/TRAINING PROGRAM

## 2022-11-26 PROCEDURE — 93005 ELECTROCARDIOGRAM TRACING: CPT

## 2022-11-26 RX ORDER — TORSEMIDE 20 MG/1
40 TABLET ORAL DAILY
Status: DISCONTINUED | OUTPATIENT
Start: 2022-11-27 | End: 2022-11-29

## 2022-11-26 RX ORDER — POTASSIUM CHLORIDE 750 MG/1
10 TABLET, EXTENDED RELEASE ORAL ONCE
Status: COMPLETED | OUTPATIENT
Start: 2022-11-26 | End: 2022-11-26

## 2022-11-26 RX ORDER — METOPROLOL TARTRATE 1 MG/ML
5 INJECTION, SOLUTION INTRAVENOUS ONCE
Status: COMPLETED | OUTPATIENT
Start: 2022-11-26 | End: 2022-11-26

## 2022-11-26 RX ORDER — HEPARIN SODIUM 5000 [USP'U]/.5ML
5000 INJECTION, SOLUTION INTRAVENOUS; SUBCUTANEOUS EVERY 12 HOURS
Status: DISCONTINUED | OUTPATIENT
Start: 2022-11-26 | End: 2022-12-03

## 2022-11-26 RX ADMIN — TORSEMIDE 40 MG: 20 TABLET ORAL at 08:35

## 2022-11-26 RX ADMIN — SPIRONOLACTONE 50 MG: 25 TABLET ORAL at 08:36

## 2022-11-26 RX ADMIN — POTASSIUM CHLORIDE 20 MEQ: 750 TABLET, EXTENDED RELEASE ORAL at 08:38

## 2022-11-26 RX ADMIN — GABAPENTIN 100 MG: 100 CAPSULE ORAL at 16:16

## 2022-11-26 RX ADMIN — HEPARIN SODIUM 5000 UNITS: 5000 INJECTION, SOLUTION INTRAVENOUS; SUBCUTANEOUS at 23:05

## 2022-11-26 RX ADMIN — HEPARIN SODIUM 5000 UNITS: 5000 INJECTION, SOLUTION INTRAVENOUS; SUBCUTANEOUS at 11:54

## 2022-11-26 RX ADMIN — Medication 3 MG: at 08:36

## 2022-11-26 RX ADMIN — ACETAMINOPHEN 650 MG: 325 TABLET, FILM COATED ORAL at 18:19

## 2022-11-26 RX ADMIN — METOPROLOL TARTRATE 5 MG: 1 INJECTION, SOLUTION INTRAVENOUS at 16:45

## 2022-11-26 RX ADMIN — GABAPENTIN 100 MG: 100 CAPSULE ORAL at 23:04

## 2022-11-26 RX ADMIN — EMPAGLIFLOZIN 10 MG: 10 TABLET, FILM COATED ORAL at 08:36

## 2022-11-26 RX ADMIN — HYDROXYCHLOROQUINE SULFATE 200 MG: 200 TABLET, FILM COATED ORAL at 20:39

## 2022-11-26 RX ADMIN — POTASSIUM CHLORIDE 20 MEQ: 750 TABLET, EXTENDED RELEASE ORAL at 20:39

## 2022-11-26 RX ADMIN — LORATADINE 10 MG: 10 TABLET ORAL at 08:35

## 2022-11-26 RX ADMIN — GABAPENTIN 100 MG: 100 CAPSULE ORAL at 10:31

## 2022-11-26 RX ADMIN — ATENOLOL 50 MG: 25 TABLET ORAL at 19:00

## 2022-11-26 RX ADMIN — POTASSIUM CHLORIDE 10 MEQ: 750 TABLET, EXTENDED RELEASE ORAL at 08:34

## 2022-11-26 RX ADMIN — Medication 200 MG: at 20:41

## 2022-11-26 RX ADMIN — GABAPENTIN 100 MG: 100 CAPSULE ORAL at 04:13

## 2022-11-26 RX ADMIN — ATENOLOL 50 MG: 25 TABLET ORAL at 08:36

## 2022-11-26 RX ADMIN — ACETAMINOPHEN 650 MG: 325 TABLET, FILM COATED ORAL at 23:03

## 2022-11-26 ASSESSMENT — ACTIVITIES OF DAILY LIVING (ADL)
ADLS_ACUITY_SCORE: 27
ADLS_ACUITY_SCORE: 28
ADLS_ACUITY_SCORE: 27

## 2022-11-26 NOTE — PROGRESS NOTES
[facility]   Cardiology   Progress Note     ASSESSMENT/PLAN:  Amelia Michel is a 62 year old year old adult with PMH of significant for HTN, chronic diastolic heart failure, atrial tacchyarrhythmias (on Eliquis), cardiac arrest (2017), SSS s/p PPM (12/2019), RA, and DLBCL who is admitted for heart failure exacerbation.     Changes today:  - Cannot be amiodarone due to history of difficulty breathing with amiodarone. If has frequent PVCs, will consider starting IV metoprolol  - Reduced torsemide from 40 mg BID to daily  - Placed on subcutaneous heparin for DVT prophylaxis     # Acute on Chronic Diastolic Heart Failure (EF 50%) NYHA Class III, AHA/ACC Stage C  # Severe Tricuspid Regurgitation   # Hypertension  Patient reporting MONTALVO, BLE edema, and 8 pound weight gain in the last week. She has been taking her home Lasix 20mg daily as prescribed. Chest X-ray suspicious for pulmonary edema. NT-P BNP 1,913. Troponin T 21-->12. No chest pain reported. EDW per chart review 116-120; admission weight 128. Denies fever, cough, PND, or orthopnea. She admits symptoms are similar to previous CHF exacerbations she has had in the past. She follows in our [facility]. Echocardiogram with severe TR.     - Fluid Status: Euvolemic to slightly hypovolemic with rising Cr; PO Torsemide 40mg daily (plan to discharge on Torsemide)  - SGLT2i: Jardiance 10mg daily   - AA: Spironolactone 50mg daily  - CVTS consulted for TV replacement/repair, timing TBD based on work up  - Plan for right heart cath and coronary angiogram Monday; NPO ordered. Will consent for procedures this weekend. Eliquis held.  - Daily standing weights, Strict I&O's  - Low Na Diet / 2L Fluid restriction  - Daily BMP; Keep K+ > 4.0, Mg > 2.0. Protocols ordered.      # SSS s/p PPM (2019)  # Atrial Tachyarrhythmias  Cardiac history dates back to May 2017 with atrial tachyarrhythmias and EF of 40-45% at that time. Patient feels as if her  "pacemaker has been \"firing more\" than her baseline. Device check report showing 11,136 episodes of \"NSVT\" since 9/25/22 although EGM's reveal irregular v-v intervals with similar morphology to intrinsic rhythm indicating probable AF w/ RVR. [id number] = 2 (+ htn, + gender). 14 beat run of NSVT on 11/23 @ 0451; asymptomatic.  - EP consulted and recommended discontinuing digoxin. Intolerant to sotalol and amiodarone  - If has frequent PVCs, will consider starting IV metoprolol  - PRN EKG's   - Held PTA Eliquis 5mg BID --> last dose 11/25, holding for cath Monday  - Continue PTA Atenolol 50mg BID      # Chronic Hyponatremia  # Hypokalemia, resolved  Chronically low sodium since April of 2022. Could be related to hypervolemic state.   - Na 134 today  - Diuresis per above  - Daily BMP   - Continue Potassium 20mEq BID and on K replacement protocol     # Rheumatoid Arthritis  - Continue PTA Prednisone 3mg daily  - Continue PTA Hydroxychloroquine 200mg daily      # Hx of Diffuse Large B-Cell Lymphoma  # Hx of Cardiac Arrest (2017)  Underwent hospitalization 6/2017 due to VF arrest in setting of normal angiogram and found to have DLBCL complicated by masses on the coronary cusps and LVOT. She underwent R-EPOCH one cycle and 5 subsequent cycles of R-CHOP completed in 1220/17 with her course complicated by pericardial effusion treated with colcichine.   - CBC every 3 days    FEN: 2 gm Sodium, 2 L Fluid restriction  Code status: Full  Prophylaxis:  PO Eliquis held, placed on subcutaneous heparin   Isolation: Contact, hx VRE, ESBL  Disposition: discharge home pending surgery/recovery, pt to remain IP prior to surgery    Patient seen and discussed with Dr. Reyes, who agrees with above plan.    Jennifer Poole MD  Cardiology Fellow  ASCOM [id number]    Interval History:  Last night, had palpitations and EKG showed frequent PVCs/ectopy. She denied chest pain, shortness of breath, and lower extremity edema this AM.    Physical Exam:  Temp: "  [97.6  F (36.4  C)-98.5  F (36.9  C)] 97.6  F (36.4  C)  Pulse:  [] 68  Resp:  [16-17] 17  BP: ()/(70-86) 109/78  SpO2:  [95 %-97 %] 95 %    I/O:    Intake/Output Summary (Last 24 hours) at 11/26/2022 1013  Last data filed at 11/26/2022 0921  Gross per 24 hour   Intake 700 ml   Output 1750 ml   Net -1050 ml     .      Wt:   Wt Readings from Last 5 Encounters:   11/26/22 49.7 kg (109 lb 9.6 oz)   11/03/22 55.8 kg (123 lb)   09/16/22 54.7 kg (120 lb 8 oz)   09/14/22 54.9 kg (121 lb)   08/26/22 55.5 kg (122 lb 6.4 oz)       General: NAD  HEENT:  NC/AT  Neck: JVD elevated to mandible 2/2 severe TR  CV: RRR. 3/6 KRISTY at LLSB. No rubs or gallops. Peripheral radial pulse intact.  Resp: No increased work of breathing or use of accessory muscles, breathing comfortably on room air.  Lung sounds clear throughout/bilaterally  Abdomen:  Normal active bowel sounds.  Abdomen is soft. No distension, non-tender to palpation.    Extremities: Warm. 2/4 radial pulses bilaterally.  2/4 pedal pulses bilaterally. No pre-tibial edema.   Skin:  Warm and dry.  Neuro: Alert and oriented x3.      Medications:    atenolol  50 mg Oral BID     empagliflozin  10 mg Oral Daily     gabapentin  100 mg Oral Q6H     hydroxychloroquine  200 mg Oral Daily     loratadine  10 mg Oral Daily     magnesium oxide  200 mg Oral Daily     potassium chloride  20 mEq Oral BID     predniSONE  3 mg Oral Daily     sodium chloride (PF)  3 mL Intracatheter Q8H     sodium chloride (PF)  3 mL Intracatheter Q8H     spironolactone  50 mg Oral Daily     [START ON 11/27/2022] torsemide  40 mg Oral Daily       - MEDICATION INSTRUCTIONS -       - MEDICATION INSTRUCTIONS -         Labs:   CMP  Recent Labs   Lab 11/26/22  0557 11/25/22  0705 11/24/22  0648 11/23/22  0542 11/19/22  1347 11/19/22  1345   * 133* 131* 129*   < > 125*   POTASSIUM 3.6 4.0 3.7 3.8   < > 4.9   CHLORIDE 95* 96* 92* 92*   < > 89*   CO2 22 13* 22 25   < > 20*   ANIONGAP 17* 24* 17* 12   <  > 16*   GLC 79 91 83 85   < > 86   BUN 35.2* 36.4* 32.8* 31.3*   < > 31.4*   CR 1.41* 1.49* 1.33* 1.29*   < > 1.19*   GFRESTIMATED 42* 39* 45* 47*   < > 52*   VIKTORIYA 9.5 9.4 8.8 8.4*   < > 9.8   MAG 2.1 2.1 2.0 2.2   < > 2.1   PROTTOTAL  --   --   --   --   --  7.6   ALBUMIN  --   --   --   --   --  4.3   BILITOTAL  --   --   --   --   --  2.8*   ALKPHOS  --   --   --   --   --  125   AST  --   --   --   --   --  125*   ALT  --   --   --   --   --  87*    < > = values in this interval not displayed.     CBC  Recent Labs   Lab 22  0557 22  0542 22  0602 22  1347 22  1316 22  1314   WBC 5.0 6.1 5.6  --   --  5.9   RBC 4.45 4.10 4.02  --   --  4.52   HGB 16.0 14.8 14.4 18.0*   < > 16.1   HCT 46.0 42.3 41.8 53   < > 46.8   * 103* 104*  --   --  104*   MCH 36.0* 36.1* 35.8*  --   --  35.6*   MCHC 34.8 35.0 34.4  --   --  34.4   RDW 16.3* 16.3* 15.7*  --   --  15.5*   PLT 94* 90* 98*  --   --  106*    < > = values in this interval not displayed.     INR  Recent Labs   Lab 22  1314   INR 4.57*     Arterial Blood GasNo lab results found in last 7 days.    Diagnostics:  22 EC/22/22 Transesophageal Echo:  Global and regional left ventricular function is normal with an EF of 55-60%.  Mild to moderate right ventricular dilation is present. Right ventricular  function is normal.  Severe tricuspid insufficiency is present due to failure of the valve leaflets  (septal-anterior) to coapt. There is no obvious leaflet tethering due to the  pacemaker lead. There are no valvular vegetations present.  No pericardial effusion is present.

## 2022-11-26 NOTE — PROVIDER NOTIFICATION
Cards 1 notified that pt's PVC runs are increasing in number of beats and frequency. 12-lead EKG completed.

## 2022-11-26 NOTE — PLAN OF CARE
D: Pt with increased ectopy. Cards 1 notified x 2 during shift. 12-lead EKG completed. Other VS stable. No pain or shortness of breath noted.   I: Monitored/assessed pt. Assisted with cares.  A: Pt stable and comfortable.  P: Continue to monitor/assess pt, contact provider with concerns.

## 2022-11-26 NOTE — PLAN OF CARE
D AVSS with sat's 95% on room air. Heart irregular with frequent PVC's which patient is Asymptomatic. Up to bedside commode to void an adequate amount and weight is down 1.2 kg this AM.   I Vital's, assessment and med's per order.   A Resting in bed with call light in reach.   P Continue to monitor and update MD with changes.

## 2022-11-26 NOTE — PROVIDER NOTIFICATION
Cards 1 notified that pt had begun having very frequent PVCs and heart rates up to 150s, with bursts of V tach. 12 lead ordered. IV metoprolol ordered and given with little effect. Pt's rhythm still with significant ectopy and elevated rate. Cards 1 notified that metoprolol ineffective.

## 2022-11-27 ENCOUNTER — APPOINTMENT (OUTPATIENT)
Dept: OCCUPATIONAL THERAPY | Facility: CLINIC | Age: 62
DRG: 273 | End: 2022-11-27
Payer: COMMERCIAL

## 2022-11-27 LAB
ANION GAP SERPL CALCULATED.3IONS-SCNC: 15 MMOL/L (ref 7–15)
ANION GAP SERPL CALCULATED.3IONS-SCNC: 15 MMOL/L (ref 7–15)
BUN SERPL-MCNC: 49.8 MG/DL (ref 8–23)
BUN SERPL-MCNC: 50.4 MG/DL (ref 8–23)
CALCIUM SERPL-MCNC: 9.7 MG/DL (ref 8.8–10.2)
CALCIUM SERPL-MCNC: 9.8 MG/DL (ref 8.8–10.2)
CHLORIDE SERPL-SCNC: 93 MMOL/L (ref 98–107)
CHLORIDE SERPL-SCNC: 93 MMOL/L (ref 98–107)
CREAT SERPL-MCNC: 1.76 MG/DL (ref 0.51–1.17)
CREAT SERPL-MCNC: 1.76 MG/DL (ref 0.51–1.17)
DEPRECATED HCO3 PLAS-SCNC: 20 MMOL/L (ref 22–29)
DEPRECATED HCO3 PLAS-SCNC: 21 MMOL/L (ref 22–29)
GFR SERPL CREATININE-BSD FRML MDRD: 32 ML/MIN/1.73M2
GFR SERPL CREATININE-BSD FRML MDRD: 32 ML/MIN/1.73M2
GLUCOSE SERPL-MCNC: 91 MG/DL (ref 70–99)
GLUCOSE SERPL-MCNC: 94 MG/DL (ref 70–99)
HOLD SPECIMEN: NORMAL
MAGNESIUM SERPL-MCNC: 2.4 MG/DL (ref 1.7–2.3)
POTASSIUM SERPL-SCNC: 4.4 MMOL/L (ref 3.4–5.3)
POTASSIUM SERPL-SCNC: 4.9 MMOL/L (ref 3.4–5.3)
SODIUM SERPL-SCNC: 128 MMOL/L (ref 136–145)
SODIUM SERPL-SCNC: 129 MMOL/L (ref 136–145)

## 2022-11-27 PROCEDURE — 36415 COLL VENOUS BLD VENIPUNCTURE: CPT | Performed by: STUDENT IN AN ORGANIZED HEALTH CARE EDUCATION/TRAINING PROGRAM

## 2022-11-27 PROCEDURE — 250N000011 HC RX IP 250 OP 636: Performed by: STUDENT IN AN ORGANIZED HEALTH CARE EDUCATION/TRAINING PROGRAM

## 2022-11-27 PROCEDURE — 97110 THERAPEUTIC EXERCISES: CPT | Mod: GO

## 2022-11-27 PROCEDURE — 214N000001 HC R&B CCU UMMC

## 2022-11-27 PROCEDURE — 83735 ASSAY OF MAGNESIUM: CPT | Performed by: INTERNAL MEDICINE

## 2022-11-27 PROCEDURE — 258N000003 HC RX IP 258 OP 636: Performed by: INTERNAL MEDICINE

## 2022-11-27 PROCEDURE — 97530 THERAPEUTIC ACTIVITIES: CPT | Mod: GO

## 2022-11-27 PROCEDURE — 99232 SBSQ HOSP IP/OBS MODERATE 35: CPT | Performed by: INTERNAL MEDICINE

## 2022-11-27 PROCEDURE — 36415 COLL VENOUS BLD VENIPUNCTURE: CPT

## 2022-11-27 PROCEDURE — 82310 ASSAY OF CALCIUM: CPT | Performed by: STUDENT IN AN ORGANIZED HEALTH CARE EDUCATION/TRAINING PROGRAM

## 2022-11-27 PROCEDURE — 250N000013 HC RX MED GY IP 250 OP 250 PS 637

## 2022-11-27 PROCEDURE — 250N000012 HC RX MED GY IP 250 OP 636 PS 637

## 2022-11-27 PROCEDURE — 250N000013 HC RX MED GY IP 250 OP 250 PS 637: Performed by: INTERNAL MEDICINE

## 2022-11-27 PROCEDURE — 80048 BASIC METABOLIC PNL TOTAL CA: CPT

## 2022-11-27 RX ADMIN — ACETAMINOPHEN 650 MG: 325 TABLET, FILM COATED ORAL at 16:06

## 2022-11-27 RX ADMIN — ACETAMINOPHEN 650 MG: 325 TABLET, FILM COATED ORAL at 22:18

## 2022-11-27 RX ADMIN — HYDROXYCHLOROQUINE SULFATE 200 MG: 200 TABLET, FILM COATED ORAL at 20:46

## 2022-11-27 RX ADMIN — HEPARIN SODIUM 5000 UNITS: 5000 INJECTION, SOLUTION INTRAVENOUS; SUBCUTANEOUS at 10:07

## 2022-11-27 RX ADMIN — EMPAGLIFLOZIN 10 MG: 10 TABLET, FILM COATED ORAL at 07:58

## 2022-11-27 RX ADMIN — GABAPENTIN 100 MG: 100 CAPSULE ORAL at 04:00

## 2022-11-27 RX ADMIN — GABAPENTIN 100 MG: 100 CAPSULE ORAL at 10:07

## 2022-11-27 RX ADMIN — ATENOLOL 50 MG: 25 TABLET ORAL at 20:45

## 2022-11-27 RX ADMIN — GABAPENTIN 100 MG: 100 CAPSULE ORAL at 16:06

## 2022-11-27 RX ADMIN — ACETAMINOPHEN 325 MG: 325 TABLET, FILM COATED ORAL at 08:08

## 2022-11-27 RX ADMIN — ATENOLOL 50 MG: 25 TABLET ORAL at 08:49

## 2022-11-27 RX ADMIN — SODIUM CHLORIDE 250 ML: 9 INJECTION, SOLUTION INTRAVENOUS at 10:13

## 2022-11-27 RX ADMIN — Medication 3 MG: at 08:00

## 2022-11-27 RX ADMIN — SPIRONOLACTONE 50 MG: 25 TABLET ORAL at 08:00

## 2022-11-27 RX ADMIN — HEPARIN SODIUM 5000 UNITS: 5000 INJECTION, SOLUTION INTRAVENOUS; SUBCUTANEOUS at 22:18

## 2022-11-27 RX ADMIN — GABAPENTIN 100 MG: 100 CAPSULE ORAL at 22:18

## 2022-11-27 RX ADMIN — LORATADINE 10 MG: 10 TABLET ORAL at 07:59

## 2022-11-27 ASSESSMENT — ACTIVITIES OF DAILY LIVING (ADL)
ADLS_ACUITY_SCORE: 27

## 2022-11-27 NOTE — PLAN OF CARE
D AVSS with sat's 95% on room air. Heart irregular with frequent PVC's which patient is Asymptomatic. Up to bedside commode to void an adequate amount and there was no change in weight from yesterday. Amelia ectopy/Vtach runs became less frequent and shorter in length the rest of the night.   I Vital's, assessment and med's per order.   A Resting in bed with call light in reach.   P Continue to monitor and update MD with changes.

## 2022-11-27 NOTE — PROVIDER NOTIFICATION
"MD updated that Amelia is having more frequent VT runs since 2200 with the longest run now of 34 beats with a rate of 166 bpm. Vital's remain stable however, she reports that she's more SOB, diaphoretic and states \"I just can't handle this rhythm anymore. MD updated and will continue to monitor.   "

## 2022-11-27 NOTE — PLAN OF CARE
D: Pt with stable HR and rhythm most of shift, but with increased ectopy from approximately 1500 on. DBP elevated. 12-lead EKG completed. Cards 1 notified and 5 mg IV metoprolol given with little or no effect. No pain or shortness of breath noted.   I: Monitored/assessed pt. Assisted with cares.  A: Pt stable and comfortable.  P: Continue to monitor/assess pt, contact provider with concerns.

## 2022-11-27 NOTE — PROGRESS NOTES
[facility] [place]   Cardiology   Progress Note     ASSESSMENT/PLAN:  Amelia Michel is a 62 year old year old adult with PMH of significant for HTN, chronic diastolic heart failure, atrial tacchyarrhythmias (on Eliquis), cardiac arrest (2017), SSS s/p PPM (12/2019), RA, and DLBCL who is admitted for heart failure exacerbation.     Changes today:  -Hold diuretics due to mild CELSA, 250 ml of fluid today     # Acute on Chronic Diastolic Heart Failure (EF 50%) NYHA Class III, AHA/ACC Stage C  # Severe Tricuspid Regurgitation   # Hypertension  - SGLT2i: Jardiance 10mg daily   - AA: Spironolactone 50mg daily  - RHC and coronary angiogram tomorrow    # SSS s/p PPM (2019)  # Atrial Tachyarrhythmias  - If has frequent PVCs or VT, will consider starting IV metoprolol  - Held PTA Eliquis 5mg BID --> last dose 11/25, holding for cath Monday  - Continue PTA Atenolol 50mg BID      # Chronic Hyponatremia  # Hypokalemia, resolved     # Rheumatoid Arthritis  - Continue PTA Prednisone 3mg daily  - Continue PTA Hydroxychloroquine 200mg daily      # Hx of Diffuse Large B-Cell Lymphoma  # Hx of Cardiac Arrest (2017)    FEN: 2 gm Sodium, 2 L Fluid restriction  Code status: Full  Prophylaxis:  PO Eliquis held, placed on subcutaneous heparin   Isolation: Contact, hx VRE, ESBL  Disposition: discharge home pending surgery/recovery, pt to remain IP prior to surgery    Physical Exam:  Temp:  [97.2  F (36.2  C)-98  F (36.7  C)] 97.7  F (36.5  C)  Pulse:  [] 81  Resp:  [16-18] 18  BP: ()/() 93/75  SpO2:  [95 %-96 %] 95 %    Wt:   Wt Readings from Last 5 Encounters:   11/27/22 49.7 kg (109 lb 9.6 oz)   11/03/22 55.8 kg (123 lb)   09/16/22 54.7 kg (120 lb 8 oz)   09/14/22 54.9 kg (121 lb)   08/26/22 55.5 kg (122 lb 6.4 oz)       General: NAD  HEENT:  NC/AT  Neck: JVD elevated to mandible 2/2 severe TR  Abdomen: Abdomen is soft. No distension, non-tender to palpation.    Extremities: Warm. 2/4 radial  pulses bilaterally.  2/4 pedal pulses bilaterally. No pre-tibial edema.   Skin:  Warm and dry.  Neuro: Alert and oriented x3.      Medications:    atenolol  50 mg Oral BID     empagliflozin  10 mg Oral Daily     gabapentin  100 mg Oral Q6H     heparin ANTICOAGULANT  5,000 Units Subcutaneous Q12H     hydroxychloroquine  200 mg Oral Daily     loratadine  10 mg Oral Daily     magnesium oxide  200 mg Oral Daily     [Held by provider] potassium chloride  20 mEq Oral BID     predniSONE  3 mg Oral Daily     sodium chloride (PF)  3 mL Intracatheter Q8H     sodium chloride (PF)  3 mL Intracatheter Q8H     spironolactone  50 mg Oral Daily     [Held by provider] torsemide  40 mg Oral Daily       - MEDICATION INSTRUCTIONS -       - MEDICATION INSTRUCTIONS -         Labs:   CMP  Recent Labs   Lab 22  0759 22  0505 22  0557 22  0705 22  0648   * 128* 134* 133* 131*   POTASSIUM 4.9 4.4 3.6 4.0 3.7   CHLORIDE 93* 93* 95* 96* 92*   CO2 21* 20* 22 13* 22   ANIONGAP 15 15 17* 24* 17*   GLC 91 94 79 91 83   BUN 49.8* 50.4* 35.2* 36.4* 32.8*   CR 1.76* 1.76* 1.41* 1.49* 1.33*   GFRESTIMATED 32* 32* 42* 39* 45*   VIKTORIYA 9.7 9.8 9.5 9.4 8.8   MAG  --  2.4* 2.1 2.1 2.0     CBC  Recent Labs   Lab 22  0557 22  0542   WBC 5.0 6.1   RBC 4.45 4.10   HGB 16.0 14.8   HCT 46.0 42.3   * 103*   MCH 36.0* 36.1*   MCHC 34.8 35.0   RDW 16.3* 16.3*   PLT 94* 90*     INR  No lab results found in last 7 days.  Arterial Blood GasNo lab results found in last 7 days.    Diagnostics:  22 EC/22/22 Transesophageal Echo:  Global and regional left ventricular function is normal with an EF of 55-60%.  Mild to moderate right ventricular dilation is present. Right ventricular  function is normal.  Severe tricuspid insufficiency is present due to failure of the valve leaflets  (septal-anterior) to coapt. There is no obvious leaflet tethering due to the  pacemaker lead. There are no valvular  vegetations present.  No pericardial effusion is present.

## 2022-11-28 ENCOUNTER — APPOINTMENT (OUTPATIENT)
Dept: GENERAL RADIOLOGY | Facility: CLINIC | Age: 62
DRG: 273 | End: 2022-11-28
Attending: INTERNAL MEDICINE
Payer: COMMERCIAL

## 2022-11-28 LAB
ALT SERPL W P-5'-P-CCNC: 45 U/L (ref 10–50)
ANION GAP SERPL CALCULATED.3IONS-SCNC: 18 MMOL/L (ref 7–15)
AST SERPL W P-5'-P-CCNC: 49 U/L (ref 10–50)
ATRIAL RATE - MUSE: 170 BPM
ATRIAL RATE - MUSE: 75 BPM
BASE EXCESS BLDV CALC-SCNC: -1.8 MMOL/L (ref -7.7–1.9)
BASOPHILS # BLD AUTO: 0.1 10E3/UL (ref 0–0.2)
BASOPHILS NFR BLD AUTO: 1 %
BUN SERPL-MCNC: 60.2 MG/DL (ref 8–23)
CA-I BLD-MCNC: 4.6 MG/DL (ref 4.4–5.2)
CALCIUM SERPL-MCNC: 9.8 MG/DL (ref 8.8–10.2)
CHLORIDE SERPL-SCNC: 90 MMOL/L (ref 98–107)
CREAT SERPL-MCNC: 1.83 MG/DL (ref 0.51–1.17)
DEPRECATED HCO3 PLAS-SCNC: 16 MMOL/L (ref 22–29)
DIASTOLIC BLOOD PRESSURE - MUSE: NORMAL MMHG
DIASTOLIC BLOOD PRESSURE - MUSE: NORMAL MMHG
EOSINOPHIL # BLD AUTO: 0 10E3/UL (ref 0–0.7)
EOSINOPHIL NFR BLD AUTO: 0 %
ERYTHROCYTE [DISTWIDTH] IN BLOOD BY AUTOMATED COUNT: 16.2 % (ref 10–15)
GFR SERPL CREATININE-BSD FRML MDRD: 31 ML/MIN/1.73M2
GLUCOSE BLDC GLUCOMTR-MCNC: 183 MG/DL (ref 70–99)
GLUCOSE SERPL-MCNC: 85 MG/DL (ref 70–99)
HCO3 BLDV-SCNC: 23 MMOL/L (ref 21–28)
HCT VFR BLD AUTO: 45.7 % (ref 35–53)
HGB BLD-MCNC: 16.3 G/DL (ref 11.7–17.7)
HGB BLD-MCNC: 17.2 G/DL (ref 11.7–17.7)
HGB BLD-MCNC: 17.9 G/DL (ref 11.7–17.7)
IMM GRANULOCYTES # BLD: 0 10E3/UL
IMM GRANULOCYTES NFR BLD: 0 %
INTERPRETATION ECG - MUSE: NORMAL
INTERPRETATION ECG - MUSE: NORMAL
LACTATE SERPL-SCNC: 1.7 MMOL/L (ref 0.7–2)
LYMPHOCYTES # BLD AUTO: 0.7 10E3/UL (ref 0.8–5.3)
LYMPHOCYTES NFR BLD AUTO: 13 %
MAGNESIUM SERPL-MCNC: 2.6 MG/DL (ref 1.7–2.3)
MCH RBC QN AUTO: 35.8 PG (ref 26.5–33)
MCHC RBC AUTO-ENTMCNC: 35.7 G/DL (ref 31.5–36.5)
MCV RBC AUTO: 100 FL (ref 78–100)
MONOCYTES # BLD AUTO: 0.7 10E3/UL (ref 0–1.3)
MONOCYTES NFR BLD AUTO: 13 %
NEUTROPHILS # BLD AUTO: 3.9 10E3/UL (ref 1.6–8.3)
NEUTROPHILS NFR BLD AUTO: 73 %
NRBC # BLD AUTO: 0.1 10E3/UL
NRBC BLD AUTO-RTO: 1 /100
O2/TOTAL GAS SETTING VFR VENT: 21 %
OXYHGB MFR BLDV: 46 % (ref 70–75)
OXYHGB MFR BLDV: 54 % (ref 92–100)
OXYHGB MFR BLDV: 85 % (ref 92–100)
P AXIS - MUSE: 16 DEGREES
P AXIS - MUSE: 177 DEGREES
PCO2 BLDV: 37 MM HG (ref 40–50)
PH BLDV: 7.4 [PH] (ref 7.32–7.43)
PHOSPHATE SERPL-MCNC: 5.3 MG/DL (ref 2.5–4.5)
PLATELET # BLD AUTO: 102 10E3/UL (ref 150–450)
PO2 BLDV: 29 MM HG (ref 25–47)
POTASSIUM SERPL-SCNC: 4.8 MMOL/L (ref 3.4–5.3)
PR INTERVAL - MUSE: NORMAL MS
PR INTERVAL - MUSE: NORMAL MS
QRS DURATION - MUSE: 134 MS
QRS DURATION - MUSE: 92 MS
QT - MUSE: 242 MS
QT - MUSE: 342 MS
QTC - MUSE: 354 MS
QTC - MUSE: 449 MS
R AXIS - MUSE: -2 DEGREES
R AXIS - MUSE: 122 DEGREES
RBC # BLD AUTO: 4.55 10E6/UL (ref 3.8–5.9)
SODIUM SERPL-SCNC: 124 MMOL/L (ref 136–145)
SYSTOLIC BLOOD PRESSURE - MUSE: NORMAL MMHG
SYSTOLIC BLOOD PRESSURE - MUSE: NORMAL MMHG
T AXIS - MUSE: -61 DEGREES
T AXIS - MUSE: 193 DEGREES
VENTRICULAR RATE- MUSE: 104 BPM
VENTRICULAR RATE- MUSE: 129 BPM
WBC # BLD AUTO: 5.4 10E3/UL (ref 4–11)

## 2022-11-28 PROCEDURE — 93456 R HRT CORONARY ARTERY ANGIO: CPT | Mod: 26 | Performed by: INTERNAL MEDICINE

## 2022-11-28 PROCEDURE — 250N000011 HC RX IP 250 OP 636: Performed by: INTERNAL MEDICINE

## 2022-11-28 PROCEDURE — 84100 ASSAY OF PHOSPHORUS: CPT | Performed by: INTERNAL MEDICINE

## 2022-11-28 PROCEDURE — 82810 BLOOD GASES O2 SAT ONLY: CPT

## 2022-11-28 PROCEDURE — 36415 COLL VENOUS BLD VENIPUNCTURE: CPT | Performed by: NURSE PRACTITIONER

## 2022-11-28 PROCEDURE — C1887 CATHETER, GUIDING: HCPCS | Performed by: INTERNAL MEDICINE

## 2022-11-28 PROCEDURE — 84450 TRANSFERASE (AST) (SGOT): CPT | Performed by: INTERNAL MEDICINE

## 2022-11-28 PROCEDURE — 200N000002 HC R&B ICU UMMC

## 2022-11-28 PROCEDURE — 250N000011 HC RX IP 250 OP 636: Performed by: NURSE PRACTITIONER

## 2022-11-28 PROCEDURE — 84450 TRANSFERASE (AST) (SGOT): CPT | Performed by: NURSE PRACTITIONER

## 2022-11-28 PROCEDURE — 85018 HEMOGLOBIN: CPT

## 2022-11-28 PROCEDURE — 250N000012 HC RX MED GY IP 250 OP 636 PS 637

## 2022-11-28 PROCEDURE — 93503 INSERT/PLACE HEART CATHETER: CPT | Mod: XE | Performed by: INTERNAL MEDICINE

## 2022-11-28 PROCEDURE — 83735 ASSAY OF MAGNESIUM: CPT | Performed by: INTERNAL MEDICINE

## 2022-11-28 PROCEDURE — 82330 ASSAY OF CALCIUM: CPT | Performed by: INTERNAL MEDICINE

## 2022-11-28 PROCEDURE — 258N000003 HC RX IP 258 OP 636: Performed by: INTERNAL MEDICINE

## 2022-11-28 PROCEDURE — 999N000015 HC STATISTIC ARTERIAL MONITORING DAILY

## 2022-11-28 PROCEDURE — 36415 COLL VENOUS BLD VENIPUNCTURE: CPT

## 2022-11-28 PROCEDURE — C1894 INTRO/SHEATH, NON-LASER: HCPCS | Performed by: INTERNAL MEDICINE

## 2022-11-28 PROCEDURE — 250N000013 HC RX MED GY IP 250 OP 250 PS 637: Performed by: INTERNAL MEDICINE

## 2022-11-28 PROCEDURE — 272N000010 HC KIT CATH ARTERIAL EXT SUPPLY

## 2022-11-28 PROCEDURE — 36620 INSERTION CATHETER ARTERY: CPT | Mod: 59 | Performed by: INTERNAL MEDICINE

## 2022-11-28 PROCEDURE — 4A023N6 MEASUREMENT OF CARDIAC SAMPLING AND PRESSURE, RIGHT HEART, PERCUTANEOUS APPROACH: ICD-10-PCS | Performed by: INTERNAL MEDICINE

## 2022-11-28 PROCEDURE — 250N000011 HC RX IP 250 OP 636: Performed by: STUDENT IN AN ORGANIZED HEALTH CARE EDUCATION/TRAINING PROGRAM

## 2022-11-28 PROCEDURE — 250N000013 HC RX MED GY IP 250 OP 250 PS 637: Performed by: NURSE PRACTITIONER

## 2022-11-28 PROCEDURE — 250N000013 HC RX MED GY IP 250 OP 250 PS 637

## 2022-11-28 PROCEDURE — 71045 X-RAY EXAM CHEST 1 VIEW: CPT

## 2022-11-28 PROCEDURE — 250N000009 HC RX 250: Performed by: INTERNAL MEDICINE

## 2022-11-28 PROCEDURE — 36556 INSERT NON-TUNNEL CV CATH: CPT | Mod: 59 | Performed by: INTERNAL MEDICINE

## 2022-11-28 PROCEDURE — 99292 CRITICAL CARE ADDL 30 MIN: CPT | Mod: 25 | Performed by: INTERNAL MEDICINE

## 2022-11-28 PROCEDURE — B2111ZZ FLUOROSCOPY OF MULTIPLE CORONARY ARTERIES USING LOW OSMOLAR CONTRAST: ICD-10-PCS | Performed by: INTERNAL MEDICINE

## 2022-11-28 PROCEDURE — 71045 X-RAY EXAM CHEST 1 VIEW: CPT | Mod: 26 | Performed by: RADIOLOGY

## 2022-11-28 PROCEDURE — 99291 CRITICAL CARE FIRST HOUR: CPT | Mod: 25 | Performed by: INTERNAL MEDICINE

## 2022-11-28 PROCEDURE — 84460 ALANINE AMINO (ALT) (SGPT): CPT | Performed by: NURSE PRACTITIONER

## 2022-11-28 PROCEDURE — 258N000003 HC RX IP 258 OP 636: Performed by: STUDENT IN AN ORGANIZED HEALTH CARE EDUCATION/TRAINING PROGRAM

## 2022-11-28 PROCEDURE — 99207 PR NO CHARGE LOS: CPT

## 2022-11-28 PROCEDURE — 99152 MOD SED SAME PHYS/QHP 5/>YRS: CPT | Performed by: INTERNAL MEDICINE

## 2022-11-28 PROCEDURE — 83605 ASSAY OF LACTIC ACID: CPT | Performed by: NURSE PRACTITIONER

## 2022-11-28 PROCEDURE — 999N000157 HC STATISTIC RCP TIME EA 10 MIN

## 2022-11-28 PROCEDURE — 93010 ELECTROCARDIOGRAM REPORT: CPT | Performed by: INTERNAL MEDICINE

## 2022-11-28 PROCEDURE — 99207 PR SC NO CHARGE VISIT: CPT | Performed by: INTERNAL MEDICINE

## 2022-11-28 PROCEDURE — 82805 BLOOD GASES W/O2 SATURATION: CPT | Performed by: STUDENT IN AN ORGANIZED HEALTH CARE EDUCATION/TRAINING PROGRAM

## 2022-11-28 PROCEDURE — 93456 R HRT CORONARY ARTERY ANGIO: CPT | Performed by: INTERNAL MEDICINE

## 2022-11-28 PROCEDURE — 250N000009 HC RX 250: Performed by: STUDENT IN AN ORGANIZED HEALTH CARE EDUCATION/TRAINING PROGRAM

## 2022-11-28 PROCEDURE — 93005 ELECTROCARDIOGRAM TRACING: CPT

## 2022-11-28 PROCEDURE — 272N000012 HC KIT CATH SWAN VIP SUPPLY

## 2022-11-28 PROCEDURE — 82310 ASSAY OF CALCIUM: CPT

## 2022-11-28 PROCEDURE — 272N000001 HC OR GENERAL SUPPLY STERILE: Performed by: INTERNAL MEDICINE

## 2022-11-28 PROCEDURE — 85014 HEMATOCRIT: CPT | Performed by: INTERNAL MEDICINE

## 2022-11-28 PROCEDURE — 99152 MOD SED SAME PHYS/QHP 5/>YRS: CPT | Mod: GC | Performed by: INTERNAL MEDICINE

## 2022-11-28 PROCEDURE — C1769 GUIDE WIRE: HCPCS | Performed by: INTERNAL MEDICINE

## 2022-11-28 PROCEDURE — 80053 COMPREHEN METABOLIC PANEL: CPT

## 2022-11-28 RX ORDER — FENTANYL CITRATE 50 UG/ML
INJECTION, SOLUTION INTRAMUSCULAR; INTRAVENOUS
Status: DISCONTINUED | OUTPATIENT
Start: 2022-11-28 | End: 2022-11-28 | Stop reason: HOSPADM

## 2022-11-28 RX ORDER — OXYCODONE HYDROCHLORIDE 5 MG/1
5 TABLET ORAL EVERY 4 HOURS PRN
Status: DISCONTINUED | OUTPATIENT
Start: 2022-11-28 | End: 2022-12-03

## 2022-11-28 RX ORDER — NALOXONE HYDROCHLORIDE 0.4 MG/ML
0.4 INJECTION, SOLUTION INTRAMUSCULAR; INTRAVENOUS; SUBCUTANEOUS
Status: DISCONTINUED | OUTPATIENT
Start: 2022-11-28 | End: 2022-11-28

## 2022-11-28 RX ORDER — LIDOCAINE HYDROCHLORIDE ANHYDROUS AND DEXTROSE MONOHYDRATE .8; 5 G/100ML; G/100ML
0.5 INJECTION, SOLUTION INTRAVENOUS CONTINUOUS
Status: DISCONTINUED | OUTPATIENT
Start: 2022-11-28 | End: 2022-11-30

## 2022-11-28 RX ORDER — NALOXONE HYDROCHLORIDE 0.4 MG/ML
0.2 INJECTION, SOLUTION INTRAMUSCULAR; INTRAVENOUS; SUBCUTANEOUS
Status: DISCONTINUED | OUTPATIENT
Start: 2022-11-28 | End: 2022-12-05 | Stop reason: HOSPADM

## 2022-11-28 RX ORDER — FENTANYL CITRATE-0.9 % NACL/PF 10 MCG/ML
PLASTIC BAG, INJECTION (ML) INTRAVENOUS
Status: DISCONTINUED | OUTPATIENT
Start: 2022-11-28 | End: 2022-11-28 | Stop reason: HOSPADM

## 2022-11-28 RX ORDER — NALOXONE HYDROCHLORIDE 0.4 MG/ML
0.2 INJECTION, SOLUTION INTRAMUSCULAR; INTRAVENOUS; SUBCUTANEOUS
Status: DISCONTINUED | OUTPATIENT
Start: 2022-11-28 | End: 2022-11-28

## 2022-11-28 RX ORDER — NITROGLYCERIN 5 MG/ML
VIAL (ML) INTRAVENOUS
Status: DISCONTINUED | OUTPATIENT
Start: 2022-11-28 | End: 2022-11-28 | Stop reason: HOSPADM

## 2022-11-28 RX ORDER — NICARDIPINE HYDROCHLORIDE 2.5 MG/ML
INJECTION INTRAVENOUS
Status: DISCONTINUED | OUTPATIENT
Start: 2022-11-28 | End: 2022-11-28 | Stop reason: HOSPADM

## 2022-11-28 RX ORDER — NALOXONE HYDROCHLORIDE 0.4 MG/ML
0.4 INJECTION, SOLUTION INTRAMUSCULAR; INTRAVENOUS; SUBCUTANEOUS
Status: DISCONTINUED | OUTPATIENT
Start: 2022-11-28 | End: 2022-12-05 | Stop reason: HOSPADM

## 2022-11-28 RX ORDER — SODIUM CHLORIDE 9 MG/ML
75 INJECTION, SOLUTION INTRAVENOUS CONTINUOUS
Status: DISCONTINUED | OUTPATIENT
Start: 2022-11-28 | End: 2022-11-28

## 2022-11-28 RX ORDER — ATROPINE SULFATE 0.1 MG/ML
0.5 INJECTION INTRAVENOUS
Status: ACTIVE | OUTPATIENT
Start: 2022-11-28 | End: 2022-11-28

## 2022-11-28 RX ORDER — METOPROLOL TARTRATE 1 MG/ML
5 INJECTION, SOLUTION INTRAVENOUS EVERY 10 MIN PRN
Status: COMPLETED | OUTPATIENT
Start: 2022-11-28 | End: 2022-11-28

## 2022-11-28 RX ORDER — HEPARIN SODIUM 1000 [USP'U]/ML
INJECTION, SOLUTION INTRAVENOUS; SUBCUTANEOUS
Status: DISCONTINUED | OUTPATIENT
Start: 2022-11-28 | End: 2022-11-28 | Stop reason: HOSPADM

## 2022-11-28 RX ORDER — FLUMAZENIL 0.1 MG/ML
0.2 INJECTION, SOLUTION INTRAVENOUS
Status: ACTIVE | OUTPATIENT
Start: 2022-11-28 | End: 2022-11-28

## 2022-11-28 RX ORDER — IOPAMIDOL 755 MG/ML
INJECTION, SOLUTION INTRAVASCULAR
Status: DISCONTINUED | OUTPATIENT
Start: 2022-11-28 | End: 2022-11-28 | Stop reason: HOSPADM

## 2022-11-28 RX ORDER — FENTANYL CITRATE 50 UG/ML
25 INJECTION, SOLUTION INTRAMUSCULAR; INTRAVENOUS
Status: DISCONTINUED | OUTPATIENT
Start: 2022-11-28 | End: 2022-11-30

## 2022-11-28 RX ORDER — OXYCODONE HYDROCHLORIDE 10 MG/1
10 TABLET ORAL EVERY 4 HOURS PRN
Status: DISCONTINUED | OUTPATIENT
Start: 2022-11-28 | End: 2022-12-03

## 2022-11-28 RX ORDER — PROTAMINE SULFATE 10 MG/ML
INJECTION, SOLUTION INTRAVENOUS
Status: DISCONTINUED | OUTPATIENT
Start: 2022-11-28 | End: 2022-11-28 | Stop reason: HOSPADM

## 2022-11-28 RX ORDER — FUROSEMIDE 10 MG/ML
80 INJECTION INTRAMUSCULAR; INTRAVENOUS 2 TIMES DAILY
Status: DISCONTINUED | OUTPATIENT
Start: 2022-11-28 | End: 2022-12-01

## 2022-11-28 RX ADMIN — HEPARIN SODIUM 5000 UNITS: 5000 INJECTION, SOLUTION INTRAVENOUS; SUBCUTANEOUS at 21:43

## 2022-11-28 RX ADMIN — ACETAMINOPHEN 650 MG: 325 TABLET, FILM COATED ORAL at 03:57

## 2022-11-28 RX ADMIN — LIDOCAINE HYDROCHLORIDE 0.5 MG/MIN: 8 INJECTION, SOLUTION INTRAVENOUS at 19:39

## 2022-11-28 RX ADMIN — METOPROLOL TARTRATE 5 MG: 1 INJECTION, SOLUTION INTRAVENOUS at 03:57

## 2022-11-28 RX ADMIN — FUROSEMIDE 80 MG: 10 INJECTION, SOLUTION INTRAVENOUS at 22:31

## 2022-11-28 RX ADMIN — AMIODARONE HYDROCHLORIDE 0.5 MG/MIN: 50 INJECTION, SOLUTION INTRAVENOUS at 18:56

## 2022-11-28 RX ADMIN — GABAPENTIN 100 MG: 100 CAPSULE ORAL at 17:33

## 2022-11-28 RX ADMIN — LIDOCAINE HYDROCHLORIDE 50 MG: 20 INJECTION INTRAVENOUS at 20:04

## 2022-11-28 RX ADMIN — METOPROLOL TARTRATE 5 MG: 1 INJECTION, SOLUTION INTRAVENOUS at 02:25

## 2022-11-28 RX ADMIN — SODIUM CHLORIDE, POTASSIUM CHLORIDE, SODIUM LACTATE AND CALCIUM CHLORIDE 250 ML: 600; 310; 30; 20 INJECTION, SOLUTION INTRAVENOUS at 02:24

## 2022-11-28 RX ADMIN — GABAPENTIN 100 MG: 100 CAPSULE ORAL at 21:41

## 2022-11-28 RX ADMIN — GABAPENTIN 100 MG: 100 CAPSULE ORAL at 12:25

## 2022-11-28 RX ADMIN — Medication 3 MG: at 07:41

## 2022-11-28 RX ADMIN — ACETAMINOPHEN 650 MG: 325 TABLET, FILM COATED ORAL at 14:49

## 2022-11-28 RX ADMIN — ATENOLOL 50 MG: 25 TABLET ORAL at 07:40

## 2022-11-28 RX ADMIN — GABAPENTIN 100 MG: 100 CAPSULE ORAL at 03:57

## 2022-11-28 RX ADMIN — ASPIRIN 325 MG: 325 TABLET, DELAYED RELEASE ORAL at 08:58

## 2022-11-28 RX ADMIN — SODIUM CHLORIDE 75 ML/HR: 9 INJECTION, SOLUTION INTRAVENOUS at 11:30

## 2022-11-28 RX ADMIN — HYDROXYCHLOROQUINE SULFATE 200 MG: 200 TABLET, FILM COATED ORAL at 21:52

## 2022-11-28 RX ADMIN — LORATADINE 10 MG: 10 TABLET ORAL at 07:40

## 2022-11-28 ASSESSMENT — ACTIVITIES OF DAILY LIVING (ADL)
ADLS_ACUITY_SCORE: 27

## 2022-11-28 NOTE — PLAN OF CARE
Pt post procedure RHC and angiogram. TF band off at 1335. CMS intact. BP taken on RLE. Spironolactone held. Pt slept most of the shift after procedure. HR/rhythm stable, AFIB with occasional pacing. Family at bedside.

## 2022-11-28 NOTE — PLAN OF CARE
D AVSS with sat's 95% on room air. Heart Afib with frequent runs of Vtach with the longest run being 41 and a rate in the 160's. During these runs Amelia reports SOB/diaphoresis and headache. Per order a 250 ml bolus of LR was infused and IV Metoprolol was given x 2 which had little affect on her heart rhythm. Labs were drawn early with K+ and mag WNL. However, sodium dropped to 124. Up to bedside commode to void an adequate amount and weight up 1.45 kgs from yesterday. Amelia is very frustrated that nothing seems to help correct her heart rhythm.  I Vital's, assessment and med's per order.   A Resting in bed with call light in reach.   P Continue to monitor and update MD with changes.

## 2022-11-28 NOTE — PROVIDER NOTIFICATION
MD updated that Amelia just had a 41 beat run of Vtach with rates in the 160's with c/o SOB/diaphoresis and headache. B/p 86/71 with a MAP of 78.

## 2022-11-28 NOTE — PROGRESS NOTES
[facility]   Cardiology   Progress Note     ASSESSMENT/PLAN:  Amelia Michel is a 62 year old year old adult with PMH of HTN, chronic diastolic heart failure, atrial tacchyarrhythmias (on Eliquis), cardiac arrest (2017), SSS s/p PPM (12/2019), RA, and DLBCL who is admitted for heart failure exacerbation.      Changes today:  - Transfer to ICU, likely replace [place]  - Start IV Lasix, Dobutamine, Amiodarone (pt agreeable)     # Acute on Chronic Diastolic Heart Failure (EF 50%) NYHA Class III, AHA/ACC Stage C  # Cardiogenic Shock  # Severe Tricuspid Regurgitation   # Hypertension, resolved  # Hypotension  Patient reporting MONTALVO, BLE edema, and 8 pound weight gain in the last week. She has been taking her home Lasix 20mg daily as prescribed. Chest X-ray suspicious for pulmonary edema. NT-P BNP 1,913. Troponin T 21-->12. No chest pain reported. EDW per chart review 116-120; admission weight 128. Denies fever, cough, PND, or orthopnea. She admits symptoms are similar to previous CHF exacerbations she has had in the past. She follows in our [facility]. Echocardiogram with severe TR. Pt with worsening Cr over the weekend, was given small boluses IVF. RHC today with RA 25, PCWP 25, PA (m) 25, Prabha CI 1.18. Cors mild non-ob CAD. Pt with minimal UOP this afternoon; likely now in cardiogenic shock.      - Fluid Status: resume Lasix 80 mg IV BID, likely start gtt when in ICU  - SGLT2i: holding Jardiance 10mg daily 2/2 CELSA  - AA: holding Spironolactone 50mg daily 2.2 CELSA, HOTN  - Likely start inotrope when transferred to ICU  - CVTS consulted for TV replacement/repair, timing TBD based on work up  - Daily standing weights, Strict I&O's  - Low Na Diet / 2L Fluid restriction  - Daily BMP; Keep K+ > 4.0, Mg > 2.0. Protocols ordered.      # SSS s/p PPM (2019)  # Atrial Tachyarrhythmias  # Freq PVC  # NSVT  Cardiac history dates back to May 2017 with atrial tachyarrhythmias and EF of 40-45% at that  "time. Patient feels as if her pacemaker has been \"firing more\" than her baseline. Device check report showing 11,136 episodes of \"NSVT\" since 9/25/22 although EGM's reveal irregular v-v intervals with similar morphology to intrinsic rhythm indicating probable AF w/ RVR. [id number] = 2 (+ htn, + gender). 40 beat NSVT overnight, pt reports SOB, diaphoretic with this rhythm. Was given IV Metoprolol and IVF.     - EP consulted and recommended discontinuing digoxin. Intolerant to sotalol and amiodarone  - Start low dose Amiodarone in ICU (pt agreeable to re-trial)  - PRN EKG's   - Holding PTA Eliquis for RHC/Cors, continue to hold for now  - Holdint PTA Atenolol 2/2 low CI     # Chronic Hyponatremia  # Hypokalemia, resolved  Chronically low sodium since April of 2022. Could be related to hypervolemic state.   - Na 124 today, increasing diuresis as above  - Daily BMP   - Continue Potassium 20mEq BID and on K replacement protocol     # Rheumatoid Arthritis  - Continue PTA Prednisone 3mg daily  - Continue PTA Hydroxychloroquine 200mg daily      # Hx of Diffuse Large B-Cell Lymphoma  # Hx of Cardiac Arrest (2017)  Underwent hospitalization 6/2017 due to VF arrest in setting of normal angiogram and found to have DLBCL complicated by masses on the coronary cusps and LVOT. She underwent R-EPOCH one cycle and 5 subsequent cycles of R-CHOP completed in 1220/17 with her course complicated by pericardial effusion treated with colcichine.   - CBC every 3 days    FEN: 2gm Sodium  Code status: Full   Prophylaxis:  PO Eliquis held for RHC, pending ICU course could restart  Isolation: Contact, VRE, ESBL  Disposition: transfer to ICU to heart failure team    Patient seen and discussed with Dr. Brothers, who agrees with above plan.    Lani Novak APRN, CNP  [facility] Cardiology Team  Pager [phone]    Interval History:  - Pt with increased ectopy overnight  - RHC this am with CI of 1.2  - Pt with minimal UOP this afternoon  - Also with " increased SOB, she feels related to ectopy worsening this afternoon    Physical Exam:  Temp:  [97.4  F (36.3  C)-98  F (36.7  C)] 98  F (36.7  C)  Pulse:  [] 75  Resp:  [12-19] 13  BP: ()/(30-93) 106/48  SpO2:  [94 %-96 %] 96 %    I/O:  Intake/Output Summary (Last 24 hours) at 11/28/2022 1428  Last data filed at 11/28/2022 0700  Gross per 24 hour   Intake 570 ml   Output 550 ml   Net 20 ml         Wt:   Wt Readings from Last 5 Encounters:   11/28/22 51.2 kg (112 lb 12.8 oz)   11/03/22 55.8 kg (123 lb)   09/16/22 54.7 kg (120 lb 8 oz)   09/14/22 54.9 kg (121 lb)   08/26/22 55.5 kg (122 lb 6.4 oz)       General: NAD  HEENT:  PERRLA, EOMI.   Neck: JVD elevated mid neck (2/2 severe TR)  CV: RRR. 3/6 murmur appreciated. No rubs or gallops. Peripheral radial pulse intact.  Resp: No increased work of breathing or use of accessory muscles, breathing comfortably on room air.  Lung sounds clear throughout/bilaterally  Abdomen:  Normal active bowel sounds.  Abdomen is soft. No distension, non-tender to palpation.    Extremities: Warm. Capillary refill less than 3 sec. 2/4 radial pulses bilaterally.  2/4 pedal pulses bilaterally. No pre-tibial edema. No cyanosis or clubbing.  Skin:  Warm and dry. No erythema, rashes, ulceration or diaphoresis.  Neuro: Alert and oriented x3.      Medications:    [Held by provider] atenolol  50 mg Oral BID     [Held by provider] empagliflozin  10 mg Oral Daily     gabapentin  100 mg Oral Q6H     heparin ANTICOAGULANT  5,000 Units Subcutaneous Q12H     hydroxychloroquine  200 mg Oral Daily     loratadine  10 mg Oral Daily     [Held by provider] magnesium oxide  200 mg Oral Daily     [Held by provider] potassium chloride  20 mEq Oral BID     predniSONE  3 mg Oral Daily     sodium chloride (PF)  3 mL Intracatheter Q8H     [Held by provider] spironolactone  50 mg Oral Daily     [Held by provider] torsemide  40 mg Oral Daily       - MEDICATION INSTRUCTIONS -       sodium chloride 75 mL/hr  (22 1130)       Labs:   CMP  Recent Labs   Lab 22  0334 22  0759 22  0505 22  0557 22  0705   * 129* 128* 134* 133*   POTASSIUM 4.8 4.9 4.4 3.6 4.0   CHLORIDE 90* 93* 93* 95* 96*   CO2 16* 21* 20* 22 13*   ANIONGAP 18* 15 15 17* 24*   GLC 85 91 94 79 91   BUN 60.2* 49.8* 50.4* 35.2* 36.4*   CR 1.83* 1.76* 1.76* 1.41* 1.49*   GFRESTIMATED 31* 32* 32* 42* 39*   VIKTORIYA 9.8 9.7 9.8 9.5 9.4   MAG 2.6*  --  2.4* 2.1 2.1     CBC  Recent Labs   Lab 22  0928 22  0927 22  0557 22  0542   WBC  --   --  5.0 6.1   RBC  --   --  4.45 4.10   HGB 17.2 17.9* 16.0 14.8   HCT  --   --  46.0 42.3   MCV  --   --  103* 103*   MCH  --   --  36.0* 36.1*   MCHC  --   --  34.8 35.0   RDW  --   --  16.3* 16.3*   PLT  --   --  94* 90*     INRNo lab results found in last 7 days.  Arterial Blood GasNo lab results found in last 7 days.    Diagnostics:  22 EC/22/22 Transesophageal Echo:  Global and regional left ventricular function is normal with an EF of 55-60%.  Mild to moderate right ventricular dilation is present. Right ventricular  function is normal.  Severe tricuspid insufficiency is present due to failure of the valve leaflets  (septal-anterior) to coapt. There is no obvious leaflet tethering due to the  pacemaker lead. There are no valvular vegetations present.  No pericardial effusion is present.    Coronary angiogram/RHC 2022:  Conclusion    Right sided filling pressures are severely elevated.    Mild elevated pulmonary hypertension.    Left sided filling pressures are moderately elevated.    Reduced cardiac output level.    Hemodynamic data has been modified in Epic per physician review.    Prox LAD lesion is 30% stenosed.     Mild non-obstructive coronary artery disease             Plan    Follow bedrest per protocol    Continued medical management and lifestyle modifications for cardiovascular risk factor optimizations.    Arterial sheath removed  from radial artery with TR band placement.    Return to the primary inpatient team for further evaluation and managmenet  Post antiplatelet therapy of    Aspirin; give 81 mg qd .    Would start statin with high intensity if there is no contraindication or intolerance.     Recommendations  General Recommendations:  - Patient given specific instructions regarding care of arteriotomy site, activity restrictions, signs and symptoms of cardiac or vascular complications and to seek immediate medical evaluation should they occur.   - Arterial sheath removed from radial artery with TR Band placement.     Medications:  - Risk factor management for atherosclerosis.     Coronary Findings  DiagnosticDominance: Left  Left Main   The vessel was visualized by selective angiography, is large and is angiographically normal.      Left Anterior Descending   The vessel is moderate in size.   Prox LAD lesion is 30% stenosed.      First Diagonal Branch   The vessel is small and is angiographically normal.      First Septal Branch   The vessel is moderate in size and is angiographically normal.      Second Diagonal Branch   The vessel is small and is angiographically normal.      Second Septal Branch   The vessel is small.      Third Diagonal Branch   The vessel is small and is angiographically normal.      Left Circumflex   The vessel is large and is angiographically normal.      First Obtuse Marginal Branch   The vessel is small and is angiographically normal.      Lateral First Obtuse Marginal Branch   The vessel is moderate in size and is angiographically normal.      Second Obtuse Marginal Branch   The vessel is moderate in size and is angiographically normal.      Third Obtuse Marginal Branch   The vessel is moderate in size and is angiographically normal.      Left Posterior Descending Artery   The vessel is moderate in size and is angiographically normal.      First Left Posterolateral Branch   The vessel is moderate in size and  is angiographically normal.      Second Left Posterolateral Branch   The vessel is small and is angiographically normal.      Left Posterior Atrioventricular Artery   The vessel is angiographically normal.      Right Coronary Artery   The vessel was visualized by selective angiography, is small and is angiographically normal.      Acute Marginal Branch   The vessel is small and is angiographically normal.      Right Ventricular Branch   The vessel is small and is angiographically normal.      Right Posterior Descending Artery   The vessel is small and is angiographically normal.      Right Posterior Atrioventricular Artery   The vessel is small and is angiographically normal.           Intervention   No interventions have been documented.     Hemodynamics  Right Heart Catheterization:  /68/79 mmHg  HR 75 BPM  BSA 1.41 m*m    RA 25 mmHg  RV 45/25 mmHg  PA 45/25 (30) mmHg  PCW 23 mmHg  Prabha CO 2.23 L/min Normal = 4.0-8.0 L/min  Prabha CI 1.51 L/min/m2 Normal = 2.5-4.0 L/min/m2  PA sat 54%   Hgb 17.2 g/dL   PVR 3.14 Woods units  SVR 1937 dynes-sec/cm5         Time Spent on this Encounter   I spent 60 minutes on the unit/floor managing the care of Amelia Michel. Over 50% of my time was spent on the following:   - Counseling the patient and/or family regarding: diagnostic results, prognosis, risks and benefits of treatment options and recommended follow-up  - Coordination of care with the: consultant(s), care coordinator/, nurse and , present at bedside    Bettina Novak, KARLEE

## 2022-11-28 NOTE — PROGRESS NOTES
"CLINICAL NUTRITION SERVICES - ASSESSMENT NOTE     Nutrition Prescription    RECOMMENDATIONS FOR MDs/PROVIDERS TO ORDER:  None at this time     Malnutrition Status:    Unable to determine due to incomplete NFPE     Recommendations already ordered by Registered Dietitian (RD):  None at this time    Future/Additional Recommendations:  -- obtain nutrition history/NFPE as able     REASON FOR ASSESSMENT  Amelia Michel is a/an 62 year old adult assessed by the dietitian for LOS    Per chart review patient with a PMH significant for HTN, chronic diastolic heart failure, atrial tacchyarrhythmias (on Eliquis), cardiac arrest (2017), SSS s/p PPM (12/2019), RA, and DLBCL who is admitted for heart failure exacerbation.     NUTRITION HISTORY  Patient was off floor and busy with other cares when this writer attempted to visit.     PO intake variable according to nursing documentation. Jay score 18 with adequate oral intake     CURRENT NUTRITION ORDERS  Diet: NPO with 2000 ml fluid restriction (previous diet: 2 gram Na diet)  Intake/Tolerance:   Patient has been ordering ~ 816-1310 kcals and 59-60 gram protein from foodservice daily over the last few days    LABS  Labs reviewed  (11/28): Na 124 mmol/L (L), BUN 60.2 mg/dL (H), Cr 1.83 mg/dL (H), Mg++ 2.6 mg/dL (H)     MEDICATIONS  Medications reviewed    ANTHROPOMETRICS  Height: 144.8 cm (4' 9\")  Most Recent Weight: 51.2 kg (112 lb 12.8 oz)    IBW: 42.7 kg  BMI: Normal BMI  Weight History:   Wt Readings from Last 10 Encounters:   11/28/22 51.2 kg (112 lb 12.8 oz)   11/03/22 55.8 kg (123 lb)   09/16/22 54.7 kg (120 lb 8 oz)   09/14/22 54.9 kg (121 lb)   08/26/22 55.5 kg (122 lb 6.4 oz)   07/20/22 54.1 kg (119 lb 4.8 oz)   06/22/22 55.3 kg (122 lb)   06/08/22 52.9 kg (116 lb 11.2 oz)   04/27/22 53.5 kg (118 lb)   04/27/22 52 kg (114 lb 9.6 oz)   7.8% weight loss x 3 months   Dosing Weight: 50 kg (lowest weight this admission 11/27)    ASSESSED NUTRITION NEEDS  Estimated Energy " Needs: 7267-8345 kcals/day (25 - 30 kcals/kg)  Justification: Maintenance  Estimated Protein Needs: 55-70 grams protein/day (1.1 - 1.4 grams of pro/kg)  Justification: Maintenance  Estimated Fluid Needs: 2000 mL/day   Justification: On a fluid restriction    PHYSICAL FINDINGS  See malnutrition section below.    MALNUTRITION  % Intake: Unable to assess  % Weight Loss: > 7.5% in 3 months (severe) - difficult to assess with possible fluid   Subcutaneous Fat Loss: Unable to assess  Muscle Loss: Unable to assess  Fluid Accumulation/Edema: None noted  Malnutrition Diagnosis: Unable to determine due to incomplete NFPE     NUTRITION DIAGNOSIS  Predicted inadequate nutrient intake energy/protein related to diet interruptions, variable intake per nursing documentation      INTERVENTIONS  Implementation  Nutrition Education: Unable to complete due to patient not available      Goals  Patient to consume % of nutritionally adequate meal trays TID, or the equivalent with supplements/snacks.     Monitoring/Evaluation  Progress toward goals will be monitored and evaluated per protocol.    Myriam Yeboah, MS/RD/MAR  6C RD pager: [phone]  Weekend/Holiday RD pager: [phone]

## 2022-11-28 NOTE — PLAN OF CARE
PT in AFIB with decreased ectopy this shift. HR , BP reads fluctuating. PT asymptomatic. Torsemide, MAG and K+ were held. NS bolus 250ml given. PT NPO at midnight 11/27 for RHC on 11/28. Pt aware. Continue to monitor, contact provider with questions and concerns. Possible TVR the week of 11/28.

## 2022-11-29 ENCOUNTER — APPOINTMENT (OUTPATIENT)
Dept: GENERAL RADIOLOGY | Facility: CLINIC | Age: 62
DRG: 273 | End: 2022-11-29
Attending: INTERNAL MEDICINE
Payer: COMMERCIAL

## 2022-11-29 LAB
ALBUMIN SERPL BCG-MCNC: 3.9 G/DL (ref 3.5–5.2)
ALP SERPL-CCNC: 102 U/L (ref 35–129)
ALP SERPL-CCNC: 109 U/L (ref 35–129)
ALP SERPL-CCNC: 97 U/L (ref 35–129)
ALP SERPL-CCNC: 97 U/L (ref 35–129)
ALT SERPL W P-5'-P-CCNC: 40 U/L (ref 10–50)
ALT SERPL W P-5'-P-CCNC: 42 U/L (ref 10–50)
ALT SERPL W P-5'-P-CCNC: 43 U/L (ref 10–50)
ALT SERPL W P-5'-P-CCNC: 43 U/L (ref 10–50)
ANION GAP SERPL CALCULATED.3IONS-SCNC: 14 MMOL/L (ref 7–15)
ANION GAP SERPL CALCULATED.3IONS-SCNC: 17 MMOL/L (ref 7–15)
ANION GAP SERPL CALCULATED.3IONS-SCNC: 17 MMOL/L (ref 7–15)
ANION GAP SERPL CALCULATED.3IONS-SCNC: 18 MMOL/L (ref 7–15)
ANION GAP SERPL CALCULATED.3IONS-SCNC: 19 MMOL/L (ref 7–15)
ANION GAP SERPL CALCULATED.3IONS-SCNC: 19 MMOL/L (ref 7–15)
AST SERPL W P-5'-P-CCNC: 45 U/L (ref 10–50)
AST SERPL W P-5'-P-CCNC: 48 U/L (ref 10–50)
AST SERPL W P-5'-P-CCNC: 50 U/L (ref 10–50)
AST SERPL W P-5'-P-CCNC: 50 U/L (ref 10–50)
BASE EXCESS BLDV CALC-SCNC: -0.3 MMOL/L (ref -7.7–1.9)
BASE EXCESS BLDV CALC-SCNC: -0.7 MMOL/L (ref -7.7–1.9)
BASE EXCESS BLDV CALC-SCNC: -2 MMOL/L (ref -7.7–1.9)
BASE EXCESS BLDV CALC-SCNC: -3.1 MMOL/L (ref -7.7–1.9)
BASE EXCESS BLDV CALC-SCNC: -7.8 MMOL/L (ref -7.7–1.9)
BASE EXCESS BLDV CALC-SCNC: 2.2 MMOL/L (ref -7.7–1.9)
BILIRUB SERPL-MCNC: 2.4 MG/DL
BILIRUB SERPL-MCNC: 2.5 MG/DL
BILIRUB SERPL-MCNC: 2.6 MG/DL
BILIRUB SERPL-MCNC: 2.9 MG/DL
BUN SERPL-MCNC: 51.8 MG/DL (ref 8–23)
BUN SERPL-MCNC: 53.1 MG/DL (ref 8–23)
BUN SERPL-MCNC: 56.3 MG/DL (ref 8–23)
BUN SERPL-MCNC: 56.3 MG/DL (ref 8–23)
BUN SERPL-MCNC: 56.4 MG/DL (ref 8–23)
BUN SERPL-MCNC: 56.8 MG/DL (ref 8–23)
CA-I BLD-MCNC: 4.2 MG/DL (ref 4.4–5.2)
CALCIUM SERPL-MCNC: 8.8 MG/DL (ref 8.8–10.2)
CALCIUM SERPL-MCNC: 9 MG/DL (ref 8.8–10.2)
CALCIUM SERPL-MCNC: 9.1 MG/DL (ref 8.8–10.2)
CALCIUM SERPL-MCNC: 9.1 MG/DL (ref 8.8–10.2)
CALCIUM SERPL-MCNC: 9.2 MG/DL (ref 8.8–10.2)
CALCIUM SERPL-MCNC: 9.3 MG/DL (ref 8.8–10.2)
CHLORIDE SERPL-SCNC: 86 MMOL/L (ref 98–107)
CHLORIDE SERPL-SCNC: 87 MMOL/L (ref 98–107)
CHLORIDE SERPL-SCNC: 87 MMOL/L (ref 98–107)
CHLORIDE SERPL-SCNC: 88 MMOL/L (ref 98–107)
CHLORIDE SERPL-SCNC: 94 MMOL/L (ref 98–107)
CHLORIDE SERPL-SCNC: 94 MMOL/L (ref 98–107)
CREAT SERPL-MCNC: 1.39 MG/DL (ref 0.51–1.17)
CREAT SERPL-MCNC: 1.4 MG/DL (ref 0.51–1.17)
CREAT SERPL-MCNC: 1.5 MG/DL (ref 0.51–1.17)
CREAT SERPL-MCNC: 1.5 MG/DL (ref 0.51–1.17)
CREAT SERPL-MCNC: 1.51 MG/DL (ref 0.51–1.17)
CREAT SERPL-MCNC: 1.54 MG/DL (ref 0.51–1.17)
DEPRECATED HCO3 PLAS-SCNC: 14 MMOL/L (ref 22–29)
DEPRECATED HCO3 PLAS-SCNC: 14 MMOL/L (ref 22–29)
DEPRECATED HCO3 PLAS-SCNC: 15 MMOL/L (ref 22–29)
DEPRECATED HCO3 PLAS-SCNC: 16 MMOL/L (ref 22–29)
DEPRECATED HCO3 PLAS-SCNC: 17 MMOL/L (ref 22–29)
DEPRECATED HCO3 PLAS-SCNC: 18 MMOL/L (ref 22–29)
ERYTHROCYTE [DISTWIDTH] IN BLOOD BY AUTOMATED COUNT: 16.2 % (ref 10–15)
GFR SERPL CREATININE-BSD FRML MDRD: 38 ML/MIN/1.73M2
GFR SERPL CREATININE-BSD FRML MDRD: 39 ML/MIN/1.73M2
GFR SERPL CREATININE-BSD FRML MDRD: 42 ML/MIN/1.73M2
GFR SERPL CREATININE-BSD FRML MDRD: 43 ML/MIN/1.73M2
GLUCOSE BLDC GLUCOMTR-MCNC: 117 MG/DL (ref 70–99)
GLUCOSE BLDC GLUCOMTR-MCNC: 134 MG/DL (ref 70–99)
GLUCOSE BLDC GLUCOMTR-MCNC: 148 MG/DL (ref 70–99)
GLUCOSE BLDC GLUCOMTR-MCNC: 172 MG/DL (ref 70–99)
GLUCOSE BLDC GLUCOMTR-MCNC: 172 MG/DL (ref 70–99)
GLUCOSE SERPL-MCNC: 114 MG/DL (ref 70–99)
GLUCOSE SERPL-MCNC: 136 MG/DL (ref 70–99)
GLUCOSE SERPL-MCNC: 141 MG/DL (ref 70–99)
GLUCOSE SERPL-MCNC: 141 MG/DL (ref 70–99)
GLUCOSE SERPL-MCNC: 145 MG/DL (ref 70–99)
GLUCOSE SERPL-MCNC: 165 MG/DL (ref 70–99)
HCO3 BLDV-SCNC: 17 MMOL/L (ref 21–28)
HCO3 BLDV-SCNC: 22 MMOL/L (ref 21–28)
HCO3 BLDV-SCNC: 23 MMOL/L (ref 21–28)
HCO3 BLDV-SCNC: 24 MMOL/L (ref 21–28)
HCO3 BLDV-SCNC: 25 MMOL/L (ref 21–28)
HCO3 BLDV-SCNC: 27 MMOL/L (ref 21–28)
HCT VFR BLD AUTO: 45.4 % (ref 35–53)
HGB BLD-MCNC: 15.8 G/DL (ref 11.7–17.7)
LACTATE SERPL-SCNC: 1.4 MMOL/L (ref 0.7–2)
LACTATE SERPL-SCNC: 1.6 MMOL/L (ref 0.7–2)
MAGNESIUM SERPL-MCNC: 2.1 MG/DL (ref 1.7–2.3)
MAGNESIUM SERPL-MCNC: 2.1 MG/DL (ref 1.7–2.3)
MAGNESIUM SERPL-MCNC: 2.4 MG/DL (ref 1.7–2.3)
MAGNESIUM SERPL-MCNC: 2.5 MG/DL (ref 1.7–2.3)
MCH RBC QN AUTO: 35.4 PG (ref 26.5–33)
MCHC RBC AUTO-ENTMCNC: 34.8 G/DL (ref 31.5–36.5)
MCV RBC AUTO: 102 FL (ref 78–100)
O2/TOTAL GAS SETTING VFR VENT: 100 %
O2/TOTAL GAS SETTING VFR VENT: 2 %
O2/TOTAL GAS SETTING VFR VENT: 21 %
O2/TOTAL GAS SETTING VFR VENT: 21 %
O2/TOTAL GAS SETTING VFR VENT: 28 %
O2/TOTAL GAS SETTING VFR VENT: 28 %
OSMOLALITY SERPL: 279 MMOL/KG (ref 280–301)
OSMOLALITY UR: 514 MMOL/KG (ref 100–1200)
OXYHGB MFR BLDV: 66 % (ref 70–75)
OXYHGB MFR BLDV: 68 % (ref 70–75)
OXYHGB MFR BLDV: 70 % (ref 70–75)
OXYHGB MFR BLDV: 73 % (ref 70–75)
PCO2 BLDV: 33 MM HG (ref 40–50)
PCO2 BLDV: 37 MM HG (ref 40–50)
PCO2 BLDV: 38 MM HG (ref 40–50)
PCO2 BLDV: 40 MM HG (ref 40–50)
PCO2 BLDV: 40 MM HG (ref 40–50)
PCO2 BLDV: 43 MM HG (ref 40–50)
PH BLDV: 7.33 [PH] (ref 7.32–7.43)
PH BLDV: 7.37 [PH] (ref 7.32–7.43)
PH BLDV: 7.41 [PH] (ref 7.32–7.43)
PH BLDV: 7.44 [PH] (ref 7.32–7.43)
PLATELET # BLD AUTO: 91 10E3/UL (ref 150–450)
PO2 BLDV: 40 MM HG (ref 25–47)
PO2 BLDV: 41 MM HG (ref 25–47)
PO2 BLDV: 42 MM HG (ref 25–47)
PO2 BLDV: 45 MM HG (ref 25–47)
POTASSIUM SERPL-SCNC: 4.2 MMOL/L (ref 3.4–5.3)
POTASSIUM SERPL-SCNC: 4.5 MMOL/L (ref 3.4–5.3)
POTASSIUM SERPL-SCNC: 4.6 MMOL/L (ref 3.4–5.3)
POTASSIUM SERPL-SCNC: 5.3 MMOL/L (ref 3.4–5.3)
PROT SERPL-MCNC: 6.7 G/DL (ref 6.4–8.3)
PROT SERPL-MCNC: 6.8 G/DL (ref 6.4–8.3)
PROT SERPL-MCNC: 7 G/DL (ref 6.4–8.3)
PROT SERPL-MCNC: 7.1 G/DL (ref 6.4–8.3)
RBC # BLD AUTO: 4.46 10E6/UL (ref 3.8–5.9)
SODIUM SERPL-SCNC: 119 MMOL/L (ref 136–145)
SODIUM SERPL-SCNC: 120 MMOL/L (ref 136–145)
SODIUM SERPL-SCNC: 120 MMOL/L (ref 136–145)
SODIUM SERPL-SCNC: 121 MMOL/L (ref 136–145)
SODIUM SERPL-SCNC: 124 MMOL/L (ref 136–145)
SODIUM SERPL-SCNC: 126 MMOL/L (ref 133–144)
SODIUM SERPL-SCNC: 126 MMOL/L (ref 136–145)
SODIUM SERPL-SCNC: 128 MMOL/L (ref 136–145)
TOTAL PROTEIN SERUM FOR ELP: 6.5 G/DL (ref 6.4–8.3)
WBC # BLD AUTO: 5.6 10E3/UL (ref 4–11)

## 2022-11-29 PROCEDURE — 85027 COMPLETE CBC AUTOMATED: CPT | Performed by: STUDENT IN AN ORGANIZED HEALTH CARE EDUCATION/TRAINING PROGRAM

## 2022-11-29 PROCEDURE — 83935 ASSAY OF URINE OSMOLALITY: CPT | Performed by: STUDENT IN AN ORGANIZED HEALTH CARE EDUCATION/TRAINING PROGRAM

## 2022-11-29 PROCEDURE — 250N000011 HC RX IP 250 OP 636: Performed by: STUDENT IN AN ORGANIZED HEALTH CARE EDUCATION/TRAINING PROGRAM

## 2022-11-29 PROCEDURE — 250N000013 HC RX MED GY IP 250 OP 250 PS 637: Performed by: INTERNAL MEDICINE

## 2022-11-29 PROCEDURE — 80048 BASIC METABOLIC PNL TOTAL CA: CPT | Performed by: STUDENT IN AN ORGANIZED HEALTH CARE EDUCATION/TRAINING PROGRAM

## 2022-11-29 PROCEDURE — 84165 PROTEIN E-PHORESIS SERUM: CPT | Mod: TC | Performed by: PATHOLOGY

## 2022-11-29 PROCEDURE — 99233 SBSQ HOSP IP/OBS HIGH 50: CPT | Mod: 25 | Performed by: NURSE PRACTITIONER

## 2022-11-29 PROCEDURE — 83735 ASSAY OF MAGNESIUM: CPT | Performed by: STUDENT IN AN ORGANIZED HEALTH CARE EDUCATION/TRAINING PROGRAM

## 2022-11-29 PROCEDURE — 99291 CRITICAL CARE FIRST HOUR: CPT | Mod: GC | Performed by: INTERNAL MEDICINE

## 2022-11-29 PROCEDURE — 86334 IMMUNOFIX E-PHORESIS SERUM: CPT | Performed by: PATHOLOGY

## 2022-11-29 PROCEDURE — 250N000013 HC RX MED GY IP 250 OP 250 PS 637

## 2022-11-29 PROCEDURE — 84155 ASSAY OF PROTEIN SERUM: CPT | Performed by: INTERNAL MEDICINE

## 2022-11-29 PROCEDURE — 258N000003 HC RX IP 258 OP 636: Performed by: STUDENT IN AN ORGANIZED HEALTH CARE EDUCATION/TRAINING PROGRAM

## 2022-11-29 PROCEDURE — 80176 ASSAY OF LIDOCAINE: CPT | Performed by: INTERNAL MEDICINE

## 2022-11-29 PROCEDURE — 84295 ASSAY OF SERUM SODIUM: CPT | Performed by: STUDENT IN AN ORGANIZED HEALTH CARE EDUCATION/TRAINING PROGRAM

## 2022-11-29 PROCEDURE — 83930 ASSAY OF BLOOD OSMOLALITY: CPT | Performed by: STUDENT IN AN ORGANIZED HEALTH CARE EDUCATION/TRAINING PROGRAM

## 2022-11-29 PROCEDURE — 83735 ASSAY OF MAGNESIUM: CPT | Performed by: INTERNAL MEDICINE

## 2022-11-29 PROCEDURE — 80053 COMPREHEN METABOLIC PANEL: CPT | Performed by: STUDENT IN AN ORGANIZED HEALTH CARE EDUCATION/TRAINING PROGRAM

## 2022-11-29 PROCEDURE — 84166 PROTEIN E-PHORESIS/URINE/CSF: CPT | Performed by: PATHOLOGY

## 2022-11-29 PROCEDURE — 82805 BLOOD GASES W/O2 SATURATION: CPT | Performed by: STUDENT IN AN ORGANIZED HEALTH CARE EDUCATION/TRAINING PROGRAM

## 2022-11-29 PROCEDURE — 71045 X-RAY EXAM CHEST 1 VIEW: CPT | Mod: 26 | Performed by: STUDENT IN AN ORGANIZED HEALTH CARE EDUCATION/TRAINING PROGRAM

## 2022-11-29 PROCEDURE — 250N000013 HC RX MED GY IP 250 OP 250 PS 637: Performed by: STUDENT IN AN ORGANIZED HEALTH CARE EDUCATION/TRAINING PROGRAM

## 2022-11-29 PROCEDURE — 200N000002 HC R&B ICU UMMC

## 2022-11-29 PROCEDURE — 250N000011 HC RX IP 250 OP 636: Performed by: NURSE PRACTITIONER

## 2022-11-29 PROCEDURE — 84450 TRANSFERASE (AST) (SGOT): CPT | Performed by: INTERNAL MEDICINE

## 2022-11-29 PROCEDURE — 250N000012 HC RX MED GY IP 250 OP 636 PS 637

## 2022-11-29 PROCEDURE — 84450 TRANSFERASE (AST) (SGOT): CPT | Performed by: STUDENT IN AN ORGANIZED HEALTH CARE EDUCATION/TRAINING PROGRAM

## 2022-11-29 PROCEDURE — 84155 ASSAY OF PROTEIN SERUM: CPT | Performed by: STUDENT IN AN ORGANIZED HEALTH CARE EDUCATION/TRAINING PROGRAM

## 2022-11-29 PROCEDURE — 82330 ASSAY OF CALCIUM: CPT | Performed by: STUDENT IN AN ORGANIZED HEALTH CARE EDUCATION/TRAINING PROGRAM

## 2022-11-29 PROCEDURE — 71045 X-RAY EXAM CHEST 1 VIEW: CPT

## 2022-11-29 PROCEDURE — 83605 ASSAY OF LACTIC ACID: CPT | Performed by: STUDENT IN AN ORGANIZED HEALTH CARE EDUCATION/TRAINING PROGRAM

## 2022-11-29 PROCEDURE — 99207 PR SC NO CHARGE VISIT: CPT | Performed by: NURSE PRACTITIONER

## 2022-11-29 RX ORDER — CALCIUM GLUCONATE 20 MG/ML
1 INJECTION, SOLUTION INTRAVENOUS ONCE
Status: COMPLETED | OUTPATIENT
Start: 2022-11-29 | End: 2022-11-29

## 2022-11-29 RX ORDER — TOLVAPTAN 15 MG/1
15 TABLET ORAL ONCE
Status: COMPLETED | OUTPATIENT
Start: 2022-11-29 | End: 2022-11-29

## 2022-11-29 RX ORDER — DEXTROSE MONOHYDRATE 50 MG/ML
INJECTION, SOLUTION INTRAVENOUS CONTINUOUS
Status: DISCONTINUED | OUTPATIENT
Start: 2022-11-29 | End: 2022-11-29

## 2022-11-29 RX ADMIN — CALCIUM GLUCONATE 1 G: 20 INJECTION, SOLUTION INTRAVENOUS at 20:39

## 2022-11-29 RX ADMIN — GABAPENTIN 100 MG: 100 CAPSULE ORAL at 10:54

## 2022-11-29 RX ADMIN — ACETAMINOPHEN 650 MG: 325 TABLET, FILM COATED ORAL at 16:40

## 2022-11-29 RX ADMIN — TOLVAPTAN 15 MG: 15 TABLET ORAL at 10:54

## 2022-11-29 RX ADMIN — FUROSEMIDE 80 MG: 10 INJECTION, SOLUTION INTRAVENOUS at 07:48

## 2022-11-29 RX ADMIN — DEXTROSE MONOHYDRATE: 50 INJECTION, SOLUTION INTRAVENOUS at 21:55

## 2022-11-29 RX ADMIN — ACETAMINOPHEN 650 MG: 325 TABLET, FILM COATED ORAL at 21:55

## 2022-11-29 RX ADMIN — FUROSEMIDE 80 MG: 10 INJECTION, SOLUTION INTRAVENOUS at 19:29

## 2022-11-29 RX ADMIN — HEPARIN SODIUM 5000 UNITS: 5000 INJECTION, SOLUTION INTRAVENOUS; SUBCUTANEOUS at 21:55

## 2022-11-29 RX ADMIN — LORATADINE 10 MG: 10 TABLET ORAL at 07:48

## 2022-11-29 RX ADMIN — Medication 3 MG: at 07:48

## 2022-11-29 RX ADMIN — HEPARIN SODIUM 5000 UNITS: 5000 INJECTION, SOLUTION INTRAVENOUS; SUBCUTANEOUS at 10:54

## 2022-11-29 RX ADMIN — GABAPENTIN 100 MG: 100 CAPSULE ORAL at 04:04

## 2022-11-29 RX ADMIN — HYDROXYCHLOROQUINE SULFATE 200 MG: 200 TABLET, FILM COATED ORAL at 19:29

## 2022-11-29 ASSESSMENT — ACTIVITIES OF DAILY LIVING (ADL)
ADLS_ACUITY_SCORE: 33
ADLS_ACUITY_SCORE: 33
ADLS_ACUITY_SCORE: 29
ADLS_ACUITY_SCORE: 33

## 2022-11-29 NOTE — PROCEDURES
PROCEDURE:   Ultrasound guided rightRadial artery line placement.     INDICATION: Blood pressure monitoring, shock    PROCEDURE :   Marcos Garces MD, PhD    SUPERVISOR:  Rudy Maldonado MD PhD    CONSENT: Obtained and in the paper chart    UNIVERSAL PROTOCOL: Patient Identification was verified, time out was performed, site marking was done. Imaging data reviewed. Full Barrier precaution done: Hands washed, mask, gloves, gown, cap and eye protection all used.     PROCEDURE SUMMARY:   An Abel's test showed good flow through both the radial and ulnar arteries.  The patient was prepped and draped in the usual sterile manner using chlorhexidine scrub. 1% lidocaine was used to numb the region. The right radial artery was palpated and visualized on ultrasound.  The artery was successfully cannulated on the first pass. Pulsatile, arterial blood was visualized and the artery was then threaded with a guide wire using the Seldinger technique.  The needle was removed and ultrasound visualized the wire in the artery, a catheter was threaded over the wire, the wire was removed, and the catheter was sutured into place. A good wave-form was obtained. The patient tolerated the procedure well without any immediate complications. The area was cleaned and a dressing was applied.     Rudy Horowitz was present for the key portions of the procedure.    ESTIMATED BLOOD LOSS: 5mL    Marcos Garces MD, PhD  Cardiology Fellow  Pager: [phone]

## 2022-11-29 NOTE — PROGRESS NOTES
Cardiology ICU Progress Note  11/29/2022      ASSESSMENT/PLAN:  Amelia Michel is a 62 year old adult, w/ a complex PMHx most significant for HFpEF, mildly dilated and reduced RV function, Severe TR (2/2 failure of leaflet coaptation), atrial flutter/fibrillation (on apixaban and rate control strategy), cardiac arrest (2017 likely 2/2 malignant involvement of the pericardium c/b organizing pericardial effusion), SSS s/p ppm (2019), VSD (s/p repair 1969), SSS s/p PPM (12/2019), RA, and DLBCL (s/p CHOP therapy and in remission), admitted w/ decompensated heart failure with pictutre c/b frequent non sustained ventricular tachycardia with RHC on 11/28/22 notable for cardiogenic shock with a cardiac index of 1.2 and CO of 1.6. Etiology unclear but overall picture concerning for possible restrictive/constrictive physiology in the setting of her malignancy history (albeit presumably in remission) with CAD ruled out as patient had non obstructive disease on coronary angiogram done on 11/28/22. Patient was transferred to the cardiac ICU for acute management of low output cardiogenic shock.       Interval history: Transferred to the ICU from acute care floor overnight for critical care management of ambulatory cardiogenic shock with [place] catheter and Arterial line successfully placed, lidocaine gtt started for NSVT and intermittent IV diuretics initiated given high bi-ventricular filling pressures with good tolerance.      Today:  - Continue Lidocaine gtt at a reduced dose of 0.75mg/hr (had been on 1mg /hr overnight)  - Follow up with EP (plan for ablation sometime this week, likely 12/1/22)  - Continue Lasix IV 80 mg bid  - Give 15 mg Tolvaptam this morning and monitor Na levels  - Follow up amyloid labs (SPEP and UPEP)    ===NEURO===  # Sedation  - None  - Noted to be mildly more somnolent on 11/29/22 in the setting of Lidocaine gtt with Gabapentin stopped.   - We will continue to closely monitor.  "    ===CARDIOVASCULAR===  #Ambulatory cardiogenic shock  #Frequent, non-sustained ventricular tachycardia  Cardiac index consistent with ambulatory cardiogenic shock with evidence of poor end organ perfusion given her worsening renal function. Suspect her cardiac output is very diminished due to frequent ectopic, non-perfusing beats. Hopefully as her ventricular ectopy improves her cardiac output and end organ function will also improve. Has a preserved EF, so suspect that there is restrictive/constrictive physiology given her prior malignancy which is driving up her filling pressures. Additionally, patient's CO/CI noted to improve once her frequent ectopies were minimized on Lidocaine gtt, suggesting some significant contribution of her NSVTs to her poor output state.     Date CVP PA PCWP MAP SVO2 CI CO SVR PVR Therapies   11/28/22 24 48/27/34 23  46 1.1 1.6   None   11/29/22 23 46/32/37 N/A  66 1.9 2.7   Lidocaine 1 mg/hr, IV Lasix   11/29/22 21 38/17/22 20 78 68 1.9 2.8 1628 0.7 Lidocaine 1 mg/hr, IV Lasix     - Volume status: Hypervolemic, IV lasix 80mg BID  - Beta-blocker: hold for now given low output state  - ACE/ARB/ARNI: Hold given CELSA  - SGLT2: Hold given CELSA  - MRA: Hold given CELSA  - Anti-arrhythmic: Status post lidocaine 50mg bolus, lidocaine gtt currently at 0.75 mg/min. Will need to monitor closely for toxicity.   - Electrophysiology consulted, appreciate recs, appreciate their assistance; plan is for Ablation on 12/1/22.  - Follow up on UPEP and SPEP  - Consider other evaluations including but not limited to more imaging studies/heart biopsy should status persist despite above measures.     # Severe tricuspid regurgitation  # SSS s/p PPM (2019)  # Atrial Tachyarrhythmias  # Freq PVC  # NSVT  Cardiac history dates back to May 2017 with atrial tachyarrhythmias and EF of 40-45% at that time. Patient feels as if her pacemaker has been \"firing more\" than her baseline. Device check report showing 11,136 " "episodes of \"NSVT\" since 9/25/22 although EGM's reveal irregular v-v intervals with similar morphology to intrinsic rhythm indicating probable AF w/ RVR. [id number] = 2 (+ htn, + gender). Recurrent NSVT noted this admission,  pt symptomatic with dyspnea and diaphoresis. Was given IV Metoprolol and IVF on early morning of 11/28/22 with minimal effect . EP consulted and recommended discontinuing digoxin. Intolerant to sotalol and amiodarone  - Hold PTA BB given shock  - Start low dose Amiodarone in ICU (pt agreeable to re-trial)  - PRN EKG's   - Holding PTA Eliquis for RHC/[place], continue to hold for now  - Holdint PTA Atenolol 2/2 low CI      ===PULMONARY===  # No acute issues.   - On room air without complaints (intermittently on 2 LPM via NC for mild hypoxia)    ===GASTROINTESTINAL===  # No acute issues    # Nutrition:   - Tolerating PO    ===RENAL===  # Acute renal failure  Baseline creatinine (0.94 - 1.16). Creatinine of 1.19 on admission (11/19) peaking at 1.83 on 11/28/22 in the setting of cardiogenic shock secondary to low output heart failure.   - Address cardiogenic shock as above   - IV diuresis as above  - Trend twice daily while in the ICU  - Avoid nephrotoxic medications     # Acute on subacute hyponatremia  Baseline sodium was ~136 until 4/2022 when it started to slowly downtrend to high 120s - low 130s. Sodium on admission was 125 and with slow downtrend to 119 on 11/28/22. Sodium, slightly improved to 121 s/p acute ICU management (diuretics, lidocaine, improved cardiac output)  - Tolvaptam 15 mg x1 on 11/29/22  - Monitor sodium levels  - May need to re-dose Tolvaptam at 30 mg tomorrow pending sodium levels.     ===HEME/ONC===  # History of Diffuse Large B-cell Lymphoma (DLBCL)  Diagnosed in 5/2017. S/p RCHOP and ECHOP therapy (tota 5 rounds). No history of radiation therapy. Deemed in complete remission in 2019 and graduated out of surveillance at that time.   - No acute issues at this time.   - " "Amyloid labs ordered given concern for restrictive/constrictive heart failure physiology    # Chronic thrombocytopenia  Baseline platelets 110s, was 106 on admission and currently at 91 on 22,  - Monitor    ===ENDOCRINE===  # No acute issues    ===INFECTIOUS DISEASE===  # No acute issues    # Antimicrobials:  N/A    ===SKIN/MSK===  # Rheumatoid arthritis  Stable  - Continue PTA Plaquenil 200 mg daily and Prednisone 3 mg daily  - Hold PTA Gabapentin in the setting of concern for increased somnolence in the setting of Lidocaine gtt.    ================================  Prophylaxis:  DVT: holding PTA   GI: N/A as patient is not intubated  Family: Updated  by bedside  Disposition: Critically Ill and in cardiogenic shock, anticipate at least 3-5 days in the ICU for continued management.   Code Status: Full (calrified with patient on 22, she is ok with advanced cardiac therapies if deemed necessary including ECMO)    ================================  Plan d/w Dr. Joel M.D., who is in agreement. Please, see staff addendum for changes/additions to the plan.    Marcos Garces MD, PhD  Cardiology Fellow  Pgr: [phone]  ===================================================================    SUBJECTIVE:   NAOE. Nursing and care team notes reviewed. Patient appeared slightly more somnolent this morning noting. She feels this is secondary to her multiple nights of poor sleep in the setting of telemetry alarms which have now subsided. She feels overall well and had no complaints this morning.  by the bedside on later assessment and was updated on patient's care plan.     OBJECTIVE:  BP (!) 152/85 (BP Location: Right leg)   Pulse 81   Temp 97.6  F (36.4  C) (Axillary)   Resp 22   Ht 1.448 m (4' 9\")   Wt 54.2 kg (119 lb 7.8 oz)   SpO2 97%   BMI 25.86 kg/m    Temp (24hrs), Av.4  F (36.3  C), Min:96.7  F (35.9  C), Max:98.1  F (36.7  C)      I/O:    Intake/Output Summary (Last 24 hours) at " 11/29/2022 1406  Last data filed at 11/29/2022 1300  Gross per 24 hour   Intake 1109.37 ml   Output 1400 ml   Net -290.63 ml       Wt:   Wt Readings from Last 5 Encounters:   11/29/22 54.2 kg (119 lb 7.8 oz)   11/03/22 55.8 kg (123 lb)   09/16/22 54.7 kg (120 lb 8 oz)   09/14/22 54.9 kg (121 lb)   08/26/22 55.5 kg (122 lb 6.4 oz)     GEN: somnolent but awakens easily to voice, pleasant, and interactive, NAD  HEENT: no icterus, eomi, mmm  CV: regular rate, irregular rhythm, normal s1/s2, diffuse 3/6 systolic murmur, CVP 21.   CHEST: poor respiratory effort, anterior exam, CTAB no rales or wheezing  ABD: soft, non-tender, normal active bowel sounds  EXTR: pulses +1. No clubbing, cyanosis or edema, arthritic distal changes  NEURO: alert oriented, speech fluent/appropriate, motor grossly nonfocal    Labs: reviewed in EMR  CBC  Recent Labs   Lab 11/29/22  0036 11/28/22 2058 11/28/22  0928 11/28/22  0927 11/26/22  0557 11/23/22  0542   WBC 5.6 5.4  --   --  5.0 6.1   RBC 4.46 4.55  --   --  4.45 4.10   HGB 15.8 16.3 17.2 17.9* 16.0 14.8   HCT 45.4 45.7  --   --  46.0 42.3   * 100  --   --  103* 103*   MCH 35.4* 35.8*  --   --  36.0* 36.1*   MCHC 34.8 35.7  --   --  34.8 35.0   RDW 16.2* 16.2*  --   --  16.3* 16.3*   PLT 91* 102*  --   --  94* 90*     BMP  Recent Labs   Lab 11/29/22  1155 11/29/22  0746 11/29/22  0344 11/29/22  0343 11/29/22  0047 11/29/22  0045 11/28/22 2058 11/28/22  0334 11/27/22  0759 11/27/22  0505   NA  --   --   --  121* 120*  120*  --  119* 124*   < > 128*   POTASSIUM  --   --   --  4.5 4.5  4.5  --  5.3 4.8   < > 4.4   CHLORIDE  --   --   --  88* 87*  87*  --  86* 90*   < > 93*   CO2  --   --   --  16* 14*  14*  --  15* 16*   < > 20*   ANIONGAP  --   --   --  17* 19*  19*  --  18* 18*   < > 15   * 134* 117* 114* 141*  141*   < > 183*  165* 85   < > 94   BUN  --   --   --  56.4* 56.3*  56.3*  --  56.8* 60.2*   < > 50.4*   CR  --   --   --  1.51* 1.50*  1.50*  --  1.54*  1.83*   < > 1.76*   GFRESTIMATED  --   --   --  39* 39*  39*  --  38* 31*   < > 32*   VIKTORIYA  --   --   --  9.2 9.1  9.1  --  9.3 9.8   < > 9.8   MAG  --   --   --   --  2.4*  --  2.5* 2.6*  --  2.4*   PHOS  --   --   --   --   --   --  5.3*  --   --   --     < > = values in this interval not displayed.     Troponins:     Lab Results   Component Value Date    TROPI <0.015 12/12/2019    TROPI 0.043 09/10/2017    TROPI 0.042 09/10/2017    TROPI 0.042 09/10/2017    TROPI 0.024 06/24/2017    TROPONIN 0.19 (HH) 05/29/2017     INRNo lab results found in last 7 days.    EKG  11/28/22: Atrial Fibrillation with RVR and PVCs at a ventricular rate of 138.    Echocardiogram:  BRE 11/22/22:  Interpretation Summary  Global and regional left ventricular function is normal with an EF of 55-60%.  Mild to moderate right ventricular dilation is present. Right ventricular  function is normal.  Severe tricuspid insufficiency is present due to failure of the valve leaflets  (septal-anterior) to coapt. There is no obvious leaflet tethering due to the  pacemaker lead. There are no valvular vegetations present.  No pericardial effusion is present.    Coronary Angiogram (11/28/22):  - Images reviewed by me. Non obstructive single vessel CAD with a 30% stenosis lesion in proximal LAD    RHC (11/28/22):   RA 25 mmHg  RV 45/25 mmHg  PA 45/25 (30) mmHg  PCW 23 mmHg  Prabha CO 1.7 L/min   Prabha CI 1.2 L/min/m2   PA sat 54%   Hgb 17.2 g/dL   PVR 3.14 [place] units  SVR 1937 dynes-sec/cm5    Imaging: reviewed in EMR.    Cardiac MRI (4/12/18):   Clinical history: 56 yo female with remote h/o VSD repair, atrial tachy-arrhythmias, cardiac arrest,  lymphoma in remission, and pericarditis seen on recent MRI.   Comparison CMR: CMR from 03/2018   1. The LV is normal in cavity size and wall thickness. The global systolic function is normal. The LVEF is  54%. There are no regional wall motion abnormalities.   2. The RV is mildly dilated in cavity size. The global  systolic function is normal. The RVEF is 49%.   3. Both atria are severely enlarged.  4. There is at least moderate, possibly severe tricuspid regurgitation.   5. Late gadolinium enhancement imaging shows hyperenhancement in the RV insertion site consistent with RV  pressure/volume overload.   6. There is a loculated pericardial effusion along the basal lateral and basal to mid inferior LV walls,  and along the inferior RV wall. it appears exudative in nature, and is beginning to organize. There is  diffuse pericardial enhancement.  7. There is no intracardiac thrombus or mass.  8. There is a small right pleural effusion.  CONCLUSIONS:  Loculated exudative pericardial effusion that is beginning to organize, with diffuse  pericardial enhancement consistent with ongoing inflammation. Normal LV fxn, LVEF 54% and RVEF 49%. At  least moderate, possibly severe tricuspid regurgitation. Compared to MRI from 03/2018, effusion is largely  unchanged in size (maybe a bit smaller) but is starting to organize. No change in pericardial enhancement.

## 2022-11-29 NOTE — PROGRESS NOTES
Cardiology Progress Note:     Transferred to the ICU after noted to have a severely reduced CO/CI with high filling pressures. Continues to have significant ectopy with very frequent runs of NSVT.     [place] placed at bedside (see procedure note). Initial hemodynamics show the following:     RA - 24  PA - 48/27 (34)  PCWP - 23  MvO2 - 46  CO - 1.6  CI - 1.1     Assessment and Plan:   #Ambulatory cardiogenic shock  #Frequent, non-sustained ventricular tachycardia  Cardiac index consistent with ambulatory cardiogenic shock and she has evidence of poor end organ perfusion given her worsening renal function. Suspect her cardiac output is very diminished due to frequent ectopic, non-perfusing beats. Hopefully as her ventricular ectopy improves her cardiac output and end organ function will also improve. Has a preserved EF, so suspect that there is restrictive/constrictive physiology given her prior malignancy which is driving up her filling pressures.   - Volume status: Hypervolemic, IV lasix 80mg BID  - Beta-blocker: hold for now given low output state  - ACE/ARB/ARNI: Hold given CELSA  - SGLT2: Hold given CELSA  - MRA: Hold given CELSA  - Anti-arrhythmic: Status post lidocaine 50mg bolus, lidocaine gtt currently at 1 mg/min. Will need to monitor closely for toxicity.   - Electrophysiology aware of the patient, appreciate their assistance    #Hyponatremia:   Sodium noted to be 119 on screening labs on arrival to the ICU. Suspect hypervolemic hyponatremia given elevated filling pressure. Hope that it will improve with diuresis but will need to be monitored closely.   - Na q4h  - Diuresis above  - If worsening will need to discuss with nephrology about 3% saline vs tolvaptan    Discussed with Dr. Joel Morin MD  Cardiology Fellow  [phone]

## 2022-11-29 NOTE — PROGRESS NOTES
"SPIRITUAL HEALTH SERVICES  SPIRITUAL ASSESSMENT Progress Note  [facility] ([place]) 4E     REFERRAL SOURCE: Follow up    Pt was asleep, but  was at the bedside.  He shared that it has been a difficult road, but he knows pt is \"in good hands.\"  He said he is waiting to hear from the medical team and get an update on pt's progress.   offered support and let him know that [facility] would continue to visit.     PLAN: [facility] will remain available     Gala Rosales  Pager: [phone]    "

## 2022-11-29 NOTE — PLAN OF CARE
Neurology:  AO4, moves all extremities. Baseline numbness tingling in LE due to neuropathy.   Pupils PERRLA.      Cardiovascular:   Temp initially 96.4. warming blanket placed. Last temp 97.1    Initially pt in afib with frequent runs of NSVT.   Lidocaine drip started with bolus, gtt increased to 1mg/min. NSVTs decreased.    Maps have been in the 70-80s    Initial CI 1.1, at 0400 2.4  CVP 24 > 23 > 13  PA 48/27 > 42/35 > 31/17  CO 13  SVR 1360       Pulmonary:   On RA, no dyspnea noted, LS clear     Gastrointestinal, Nutrition: no BM this shift  2L fluid restriction   2g NA diet         Renal, Electrolytes:   80mg Lasix given with 600ml total UOP    K 4.5   > 121  Mag 2.4  Phos 5.3    Infectious Disease: contact precautions continued       Hematology: hgb 15.6      Endocrinology: no insulin needed. CBG stable        MSK/Skin: no concerns at this time     Lines/Drains:  [place] placed, @56. Placement confirmed by xray  R radial art line placed     Plan:   EP to see pt in the AM  Monitor hemodynamics            All questions from family and patient have been addressed  All safety checks complete  MD aware of all patient status changes and updated promptly  See chart for all other details

## 2022-11-29 NOTE — PROGRESS NOTES
Cardiology Brief Progress Note     PM [place] numbers:  RA 18  PA 34/16/22  CWP 14  SVO2 70  Prabha CO/CI: 3.3/2.2  MAP 76  SVR 1400  PVR 2.4  SaO2 97%     Intake/Output Summary (Last 24 hours) at 11/29/2022 1520  Last data filed at 11/29/2022 1400  Gross per 24 hour   Intake 846.87 ml   Output 1950 ml   Net -1103.13 ml     Compared to AM values;  - RA decreased from 20 to 14  - PCWP decreased from 20 to 16  - Continue IV diuresis     Edmund Magana MD   Cardiology Fellow

## 2022-11-29 NOTE — PROGRESS NOTES
Admitted/transferred from: 6 C @1830   Reason for admission/transfer: Low CI, afib, IV amio  2 RN skin assessment: completed by AZ& CS  Result of skin assessment and interventions/actions: bilat LE dusky, per pt normal. Missing L greater toe d/t RA. Unremarkable for remainder of body.  Height, weight, drug calc weight: Done  Patient belongings (see Flowsheet)  MDRO education added to care plan Yes  ?

## 2022-11-30 ENCOUNTER — APPOINTMENT (OUTPATIENT)
Dept: GENERAL RADIOLOGY | Facility: CLINIC | Age: 62
DRG: 273 | End: 2022-11-30
Attending: STUDENT IN AN ORGANIZED HEALTH CARE EDUCATION/TRAINING PROGRAM
Payer: COMMERCIAL

## 2022-11-30 ENCOUNTER — APPOINTMENT (OUTPATIENT)
Dept: OCCUPATIONAL THERAPY | Facility: CLINIC | Age: 62
DRG: 273 | End: 2022-11-30
Attending: INTERNAL MEDICINE
Payer: COMMERCIAL

## 2022-11-30 ENCOUNTER — APPOINTMENT (OUTPATIENT)
Dept: GENERAL RADIOLOGY | Facility: CLINIC | Age: 62
DRG: 273 | End: 2022-11-30
Attending: INTERNAL MEDICINE
Payer: COMMERCIAL

## 2022-11-30 LAB
ABO/RH(D): NORMAL
ALBUMIN MFR UR ELPH: 71.2 %
ALBUMIN SERPL BCG-MCNC: 3.4 G/DL (ref 3.5–5.2)
ALBUMIN SERPL BCG-MCNC: 3.8 G/DL (ref 3.5–5.2)
ALBUMIN SERPL ELPH-MCNC: 3.7 G/DL (ref 3.7–5.1)
ALP SERPL-CCNC: 101 U/L (ref 35–129)
ALP SERPL-CCNC: 82 U/L (ref 35–129)
ALPHA1 GLOB MFR UR ELPH: 4.8 %
ALPHA1 GLOB SERPL ELPH-MCNC: 0.3 G/DL (ref 0.2–0.4)
ALPHA2 GLOB MFR UR ELPH: 8.4 %
ALPHA2 GLOB SERPL ELPH-MCNC: 0.5 G/DL (ref 0.5–0.9)
ALT SERPL W P-5'-P-CCNC: 33 U/L (ref 10–50)
ALT SERPL W P-5'-P-CCNC: 38 U/L (ref 10–50)
ANION GAP SERPL CALCULATED.3IONS-SCNC: 12 MMOL/L (ref 7–15)
ANION GAP SERPL CALCULATED.3IONS-SCNC: 15 MMOL/L (ref 7–15)
ANTIBODY SCREEN: NEGATIVE
AST SERPL W P-5'-P-CCNC: 39 U/L (ref 10–50)
AST SERPL W P-5'-P-CCNC: 47 U/L (ref 10–50)
ATRIAL RATE - MUSE: 182 BPM
B-GLOBULIN MFR UR ELPH: 10.6 %
B-GLOBULIN SERPL ELPH-MCNC: 0.6 G/DL (ref 0.6–1)
BASE EXCESS BLDV CALC-SCNC: -0.5 MMOL/L (ref -7.7–1.9)
BASE EXCESS BLDV CALC-SCNC: 0.9 MMOL/L (ref -7.7–1.9)
BASE EXCESS BLDV CALC-SCNC: 0.9 MMOL/L (ref -7.7–1.9)
BASE EXCESS BLDV CALC-SCNC: 1 MMOL/L (ref -7.7–1.9)
BASE EXCESS BLDV CALC-SCNC: 1.6 MMOL/L (ref -7.7–1.9)
BASE EXCESS BLDV CALC-SCNC: 2.2 MMOL/L (ref -7.7–1.9)
BILIRUB SERPL-MCNC: 2.4 MG/DL
BILIRUB SERPL-MCNC: 3.1 MG/DL
BUN SERPL-MCNC: 44.5 MG/DL (ref 8–23)
BUN SERPL-MCNC: 46.2 MG/DL (ref 8–23)
CA-I BLD-MCNC: 4.6 MG/DL (ref 4.4–5.2)
CALCIUM SERPL-MCNC: 8.7 MG/DL (ref 8.8–10.2)
CALCIUM SERPL-MCNC: 9.1 MG/DL (ref 8.8–10.2)
CHLORIDE SERPL-SCNC: 93 MMOL/L (ref 98–107)
CHLORIDE SERPL-SCNC: 96 MMOL/L (ref 98–107)
CREAT SERPL-MCNC: 1.23 MG/DL (ref 0.51–1.17)
CREAT SERPL-MCNC: 1.4 MG/DL (ref 0.51–1.17)
DEPRECATED HCO3 PLAS-SCNC: 19 MMOL/L (ref 22–29)
DEPRECATED HCO3 PLAS-SCNC: 21 MMOL/L (ref 22–29)
DIASTOLIC BLOOD PRESSURE - MUSE: NORMAL MMHG
ERYTHROCYTE [DISTWIDTH] IN BLOOD BY AUTOMATED COUNT: 16.7 % (ref 10–15)
GAMMA GLOB MFR UR ELPH: 5 %
GAMMA GLOB SERPL ELPH-MCNC: 1.4 G/DL (ref 0.7–1.6)
GFR SERPL CREATININE-BSD FRML MDRD: 42 ML/MIN/1.73M2
GFR SERPL CREATININE-BSD FRML MDRD: 49 ML/MIN/1.73M2
GLUCOSE BLDC GLUCOMTR-MCNC: 101 MG/DL (ref 70–99)
GLUCOSE BLDC GLUCOMTR-MCNC: 123 MG/DL (ref 70–99)
GLUCOSE BLDC GLUCOMTR-MCNC: 123 MG/DL (ref 70–99)
GLUCOSE BLDC GLUCOMTR-MCNC: 162 MG/DL (ref 70–99)
GLUCOSE BLDC GLUCOMTR-MCNC: 186 MG/DL (ref 70–99)
GLUCOSE SERPL-MCNC: 107 MG/DL (ref 70–99)
GLUCOSE SERPL-MCNC: 172 MG/DL (ref 70–99)
HCO3 BLDV-SCNC: 24 MMOL/L (ref 21–28)
HCO3 BLDV-SCNC: 26 MMOL/L (ref 21–28)
HCO3 BLDV-SCNC: 27 MMOL/L (ref 21–28)
HCT VFR BLD AUTO: 46.3 % (ref 35–53)
HGB BLD-MCNC: 16.3 G/DL (ref 11.7–17.7)
INTERPRETATION ECG - MUSE: NORMAL
LACTATE SERPL-SCNC: 1.5 MMOL/L (ref 0.7–2)
LACTATE SERPL-SCNC: 2.1 MMOL/L (ref 0.7–2)
LIDOCAIN SERPL-MCNC: 6.3 UG/ML
M PROTEIN MFR UR ELPH: 2.2 %
M PROTEIN SERPL ELPH-MCNC: 0.1 G/DL
MAGNESIUM SERPL-MCNC: 1.8 MG/DL (ref 1.7–2.3)
MAGNESIUM SERPL-MCNC: 2.6 MG/DL (ref 1.7–2.3)
MCH RBC QN AUTO: 35.8 PG (ref 26.5–33)
MCHC RBC AUTO-ENTMCNC: 35.2 G/DL (ref 31.5–36.5)
MCV RBC AUTO: 102 FL (ref 78–100)
O2/TOTAL GAS SETTING VFR VENT: 2 %
O2/TOTAL GAS SETTING VFR VENT: 2 %
O2/TOTAL GAS SETTING VFR VENT: 23 %
O2/TOTAL GAS SETTING VFR VENT: 28 %
OXYHGB MFR BLDV: 63 % (ref 70–75)
OXYHGB MFR BLDV: 68 % (ref 70–75)
OXYHGB MFR BLDV: 69 % (ref 70–75)
OXYHGB MFR BLDV: 74 % (ref 70–75)
OXYHGB MFR BLDV: 75 % (ref 70–75)
OXYHGB MFR BLDV: 75 % (ref 70–75)
P AXIS - MUSE: NORMAL DEGREES
PCO2 BLDV: 37 MM HG (ref 40–50)
PCO2 BLDV: 38 MM HG (ref 40–50)
PCO2 BLDV: 38 MM HG (ref 40–50)
PCO2 BLDV: 40 MM HG (ref 40–50)
PCO2 BLDV: 43 MM HG (ref 40–50)
PCO2 BLDV: 44 MM HG (ref 40–50)
PH BLDV: 7.39 [PH] (ref 7.32–7.43)
PH BLDV: 7.39 [PH] (ref 7.32–7.43)
PH BLDV: 7.41 [PH] (ref 7.32–7.43)
PH BLDV: 7.41 [PH] (ref 7.32–7.43)
PH BLDV: 7.44 [PH] (ref 7.32–7.43)
PH BLDV: 7.45 [PH] (ref 7.32–7.43)
PLATELET # BLD AUTO: 89 10E3/UL (ref 150–450)
PO2 BLDV: 36 MM HG (ref 25–47)
PO2 BLDV: 39 MM HG (ref 25–47)
PO2 BLDV: 39 MM HG (ref 25–47)
PO2 BLDV: 42 MM HG (ref 25–47)
PO2 BLDV: 44 MM HG (ref 25–47)
PO2 BLDV: 45 MM HG (ref 25–47)
POTASSIUM SERPL-SCNC: 3.5 MMOL/L (ref 3.4–5.3)
POTASSIUM SERPL-SCNC: 5.2 MMOL/L (ref 3.4–5.3)
POTASSIUM SERPL-SCNC: 5.6 MMOL/L (ref 3.4–5.3)
PR INTERVAL - MUSE: NORMAL MS
PROT PATTERN SERPL ELPH-IMP: ABNORMAL
PROT PATTERN SERPL IFE-IMP: NORMAL
PROT PATTERN UR ELPH-IMP: ABNORMAL
PROT SERPL-MCNC: 6 G/DL (ref 6.4–8.3)
PROT SERPL-MCNC: 6.8 G/DL (ref 6.4–8.3)
QRS DURATION - MUSE: 136 MS
QT - MUSE: 324 MS
QTC - MUSE: 490 MS
R AXIS - MUSE: 3 DEGREES
RBC # BLD AUTO: 4.55 10E6/UL (ref 3.8–5.9)
SODIUM SERPL-SCNC: 126 MMOL/L (ref 136–145)
SODIUM SERPL-SCNC: 127 MMOL/L (ref 136–145)
SODIUM SERPL-SCNC: 129 MMOL/L (ref 136–145)
SPECIMEN EXPIRATION DATE: NORMAL
SYSTOLIC BLOOD PRESSURE - MUSE: NORMAL MMHG
T AXIS - MUSE: 188 DEGREES
VENTRICULAR RATE- MUSE: 138 BPM
WBC # BLD AUTO: 3.9 10E3/UL (ref 4–11)

## 2022-11-30 PROCEDURE — 3E043XZ INTRODUCTION OF VASOPRESSOR INTO CENTRAL VEIN, PERCUTANEOUS APPROACH: ICD-10-PCS | Performed by: INTERNAL MEDICINE

## 2022-11-30 PROCEDURE — 97535 SELF CARE MNGMENT TRAINING: CPT | Mod: GO

## 2022-11-30 PROCEDURE — 250N000013 HC RX MED GY IP 250 OP 250 PS 637

## 2022-11-30 PROCEDURE — 250N000012 HC RX MED GY IP 250 OP 636 PS 637

## 2022-11-30 PROCEDURE — 80176 ASSAY OF LIDOCAINE: CPT | Performed by: STUDENT IN AN ORGANIZED HEALTH CARE EDUCATION/TRAINING PROGRAM

## 2022-11-30 PROCEDURE — 82330 ASSAY OF CALCIUM: CPT | Performed by: INTERNAL MEDICINE

## 2022-11-30 PROCEDURE — 71045 X-RAY EXAM CHEST 1 VIEW: CPT

## 2022-11-30 PROCEDURE — 86334 IMMUNOFIX E-PHORESIS SERUM: CPT | Mod: 26

## 2022-11-30 PROCEDURE — 84165 PROTEIN E-PHORESIS SERUM: CPT | Mod: 26

## 2022-11-30 PROCEDURE — 83735 ASSAY OF MAGNESIUM: CPT | Performed by: STUDENT IN AN ORGANIZED HEALTH CARE EDUCATION/TRAINING PROGRAM

## 2022-11-30 PROCEDURE — 200N000002 HC R&B ICU UMMC

## 2022-11-30 PROCEDURE — 250N000011 HC RX IP 250 OP 636: Performed by: INTERNAL MEDICINE

## 2022-11-30 PROCEDURE — 83605 ASSAY OF LACTIC ACID: CPT | Performed by: STUDENT IN AN ORGANIZED HEALTH CARE EDUCATION/TRAINING PROGRAM

## 2022-11-30 PROCEDURE — 71045 X-RAY EXAM CHEST 1 VIEW: CPT | Mod: 26 | Performed by: RADIOLOGY

## 2022-11-30 PROCEDURE — 86850 RBC ANTIBODY SCREEN: CPT | Performed by: INTERNAL MEDICINE

## 2022-11-30 PROCEDURE — 84155 ASSAY OF PROTEIN SERUM: CPT | Performed by: STUDENT IN AN ORGANIZED HEALTH CARE EDUCATION/TRAINING PROGRAM

## 2022-11-30 PROCEDURE — 86901 BLOOD TYPING SEROLOGIC RH(D): CPT | Performed by: INTERNAL MEDICINE

## 2022-11-30 PROCEDURE — 84132 ASSAY OF SERUM POTASSIUM: CPT | Performed by: INTERNAL MEDICINE

## 2022-11-30 PROCEDURE — 82805 BLOOD GASES W/O2 SATURATION: CPT | Performed by: STUDENT IN AN ORGANIZED HEALTH CARE EDUCATION/TRAINING PROGRAM

## 2022-11-30 PROCEDURE — 85027 COMPLETE CBC AUTOMATED: CPT | Performed by: STUDENT IN AN ORGANIZED HEALTH CARE EDUCATION/TRAINING PROGRAM

## 2022-11-30 PROCEDURE — 250N000011 HC RX IP 250 OP 636: Performed by: STUDENT IN AN ORGANIZED HEALTH CARE EDUCATION/TRAINING PROGRAM

## 2022-11-30 PROCEDURE — 250N000013 HC RX MED GY IP 250 OP 250 PS 637: Performed by: INTERNAL MEDICINE

## 2022-11-30 PROCEDURE — 84166 PROTEIN E-PHORESIS/URINE/CSF: CPT | Mod: 26

## 2022-11-30 PROCEDURE — 80053 COMPREHEN METABOLIC PANEL: CPT | Performed by: STUDENT IN AN ORGANIZED HEALTH CARE EDUCATION/TRAINING PROGRAM

## 2022-11-30 PROCEDURE — 250N000013 HC RX MED GY IP 250 OP 250 PS 637: Performed by: STUDENT IN AN ORGANIZED HEALTH CARE EDUCATION/TRAINING PROGRAM

## 2022-11-30 PROCEDURE — 99291 CRITICAL CARE FIRST HOUR: CPT | Mod: 25 | Performed by: INTERNAL MEDICINE

## 2022-11-30 PROCEDURE — 84295 ASSAY OF SERUM SODIUM: CPT | Performed by: STUDENT IN AN ORGANIZED HEALTH CARE EDUCATION/TRAINING PROGRAM

## 2022-11-30 PROCEDURE — 999N000065 XR CHEST PORT 1 VIEW

## 2022-11-30 PROCEDURE — 258N000003 HC RX IP 258 OP 636: Performed by: STUDENT IN AN ORGANIZED HEALTH CARE EDUCATION/TRAINING PROGRAM

## 2022-11-30 RX ORDER — MAGNESIUM SULFATE HEPTAHYDRATE 40 MG/ML
2 INJECTION, SOLUTION INTRAVENOUS ONCE
Status: DISCONTINUED | OUTPATIENT
Start: 2022-11-30 | End: 2022-12-01

## 2022-11-30 RX ORDER — GABAPENTIN 100 MG/1
100 CAPSULE ORAL ONCE
Status: COMPLETED | OUTPATIENT
Start: 2022-11-30 | End: 2022-11-30

## 2022-11-30 RX ORDER — POTASSIUM CHLORIDE 29.8 MG/ML
20 INJECTION INTRAVENOUS ONCE
Status: COMPLETED | OUTPATIENT
Start: 2022-11-30 | End: 2022-11-30

## 2022-11-30 RX ORDER — POTASSIUM CHLORIDE 750 MG/1
40 TABLET, EXTENDED RELEASE ORAL ONCE
Status: COMPLETED | OUTPATIENT
Start: 2022-11-30 | End: 2022-11-30

## 2022-11-30 RX ORDER — LIDOCAINE 40 MG/G
CREAM TOPICAL
Status: CANCELLED | OUTPATIENT
Start: 2022-11-30

## 2022-11-30 RX ORDER — MAGNESIUM SULFATE HEPTAHYDRATE 40 MG/ML
2 INJECTION, SOLUTION INTRAVENOUS ONCE
Status: COMPLETED | OUTPATIENT
Start: 2022-11-30 | End: 2022-11-30

## 2022-11-30 RX ORDER — SODIUM CHLORIDE 9 MG/ML
INJECTION, SOLUTION INTRAVENOUS CONTINUOUS
Status: CANCELLED | OUTPATIENT
Start: 2022-11-30

## 2022-11-30 RX ADMIN — VASOPRESSIN 0.1 UNITS/HR: 20 INJECTION, SOLUTION INTRAMUSCULAR; SUBCUTANEOUS at 17:15

## 2022-11-30 RX ADMIN — LORATADINE 10 MG: 10 TABLET ORAL at 08:22

## 2022-11-30 RX ADMIN — POTASSIUM CHLORIDE 20 MEQ: 29.8 INJECTION, SOLUTION INTRAVENOUS at 06:22

## 2022-11-30 RX ADMIN — HEPARIN SODIUM 5000 UNITS: 5000 INJECTION, SOLUTION INTRAVENOUS; SUBCUTANEOUS at 10:31

## 2022-11-30 RX ADMIN — MAGNESIUM SULFATE IN WATER 2 G: 40 INJECTION, SOLUTION INTRAVENOUS at 06:25

## 2022-11-30 RX ADMIN — POTASSIUM CHLORIDE 40 MEQ: 750 TABLET, EXTENDED RELEASE ORAL at 08:22

## 2022-11-30 RX ADMIN — Medication 3 MG: at 08:22

## 2022-11-30 RX ADMIN — LIDOCAINE HYDROCHLORIDE 0.75 MG/MIN: 8 INJECTION, SOLUTION INTRAVENOUS at 06:10

## 2022-11-30 RX ADMIN — ACETAMINOPHEN 650 MG: 325 TABLET, FILM COATED ORAL at 12:09

## 2022-11-30 RX ADMIN — ACETAMINOPHEN 650 MG: 325 TABLET, FILM COATED ORAL at 20:31

## 2022-11-30 RX ADMIN — HYDROXYCHLOROQUINE SULFATE 200 MG: 200 TABLET, FILM COATED ORAL at 20:26

## 2022-11-30 RX ADMIN — GABAPENTIN 100 MG: 100 CAPSULE ORAL at 22:29

## 2022-11-30 RX ADMIN — HEPARIN SODIUM 5000 UNITS: 5000 INJECTION, SOLUTION INTRAVENOUS; SUBCUTANEOUS at 21:32

## 2022-11-30 ASSESSMENT — ACTIVITIES OF DAILY LIVING (ADL)
ADLS_ACUITY_SCORE: 33

## 2022-11-30 NOTE — PROGRESS NOTES
Major Shift Events:      Neuro:A/O- intermittently 'fuzzy' states she feels better than is AM- neuro WNL SAMPSON, weak, bilat numb/ting in hands andfeet baseline, see EMR for Prabha #s  CV: Vtach- afib, runs of VT intermittently, BP stable, afebrile  Respi: on 2L for comfort, LS clear  Gi/Gu: 2gram NA diet with 2L FR, 3 small BMs today, voiding via purwick- diuresing well    Plan: wean lido gtt slowly, EP lab Thursday afternoon for ablation    For vital signs and complete assessments, please see documentation flowsheets.

## 2022-11-30 NOTE — PLAN OF CARE
"Major Shift Events: Increased lethargy and mental \"fuzziness\" throughout shift, team aware, lidocaine decreased, level pending. Afib with short runs of self limiting VT and frequent ectopy. BP stable. Prabha #'s stable. 2LNC. No BM. Brisk urine output via purewick.   Plan: Wean lido gtt and plan for ablation Thursday.   For vital signs and complete assessments, please see documentation flowsheets.     "

## 2022-11-30 NOTE — PROGRESS NOTES
Electrophysiology Consult Service  Follow up Note   EP Attending:    Date of Service: 11/29/2022     S: We continue to follow this patient for PVC management. She had RHC and angiogram which showed showed no obstructive CAD and PCW 23, CO 1.7 CI 1.2. Right sided filling pressures are severely elevated. Left sided filling pressures are moderately elevated. Mild elevated pulmonary hypertension. Reduced cardiac output level. She was transferred to HF service. She continued to have frequent PVCs and some runs of NSVT. She was given IV lidocaine which did suppress PVCs, but she started having neuro side effects. Her Cardiac Index did improve with ectopy suppression and her diuretic response improved as well.   HPI:   Ms. Michel is a 61 year old female who has a past medical history significant for VSD s/p repair 1969, RA, HTN, insomnia, atrial tachyarrhythmias, cardiac arrest, SSS s/p PPM 12/2019, HFpEF, mod/severe TR, DLBCL, and exudative pericardial effusion.   She was admitted from 5/15/17-5/23/17 with atrial tachyarrhythmias (SVT, AF, AFL) with symptoms of palpitations, fatigue, and nausea. An echo showed LVEF 40-45%, mildly reduced RVEF, moderate biatrial enlargement, mild mitral regurgitation, and moderate tricuspid regurgitation. . She was started on Eliquis and underwent a BRE/DCCV on 5/17/17 c/b hypotension and recurrent arrhythmia.She was started on Sotalol and chemically cardioverted. However, she had nausea and thought it was from the Sotalol so this was stopped. She reverted back to AF/AFL and a rate control strategy was adopted which was difficult given symptoms so she started amiodarone. CMRI  showed LVEF 55%, mildly dilated RV with focal area of dyskinesis in RVOT, severe bi-atrial enlargement, severe TR, mild/moderate MR, and DE at RV septal insertion site. RFA was discussed but was defered as patient had a UTI at the time with ESBL.  She was readmitted on 6/19/17-8/4/17 after suffering an  "out of hospital cardiac arrest.  Upon EMS arrival, she was in VF. She was externally defibrillated and had ROSC in the field. She was intubated and brought to [facility] found to be in escape rhythm 43 bpm. She was taken emergently to cath lab where coronary angiogram showed normal coronary arteries. Temporary pacemaker was placed in RA given sinus arrest. She was also placed on therapeutic hypothermia. She had been having nausea, diarrhea, and intermittent vomiting over the last week and generally had not been feeling well over the last month and also had saddle anesthesia with lower extremity numbness that had been evaluated by neuro as an outpatient.  Ultimately found to have DLBCL started on chemo cycles. 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     Her DLBCL was treated with R-EPOCH for one cycle while in the hospital complicated by cytopenias followed by 5 cycles of R-CHOP finished the cycles in December of 2017 currently on surveillance scans. She had a pericardial effusion for which she is on colchicine which was discontinued at the end of 6/2018.   She has then followed intermittently with EP in clinic. She had some recurrent AF in 4/2018 requiring ED visit with DCCV with recurrence and another DCCV. We restarted digoxin 4/4/18 after which she had one episode of AF s/p DCCV and has since maintained sinus rhythm and reported good symptomatic control. She was seen in 5/2018 and reported having a feeling of her heart \"rolling\" daily lasting about about 10 sec at the most. The last time she required DCCV was in 4/12/2018. Her cMRI had shown some organization of her [ericardial effusion) and she has been initiated on colchicine. Chest CT in 9/2018 showed no evidence of lymphoma recurrence and improved pericardial effusion. Last EP follow up was in 2/2019 and she was doing well without issues.    She then " "presented 12/12/2019 with 1 day history palpitations, headaches, and nausea. She also endorses some dizziness when walking but not at rest. She was found to be bradycardic with intermittent junctional rhythm into 30's. Electrolytes and renal function stable. Her atenolol and digoxin were held. She continued to have junctional/bradycardia and decision was made to pursue PPM which was placed 12/13/2019. She has been in AF/AFL since 11/2020. She has been under a rate control strategy with atenolol and digoxin.  TTE done 8/29/22 shows a normal EF, dilated IVC with mod to sev TR and some pulmonary HTN, severe biatrial enlargement. She reported some worsening HF symptoms at last EP visit in 8/29/22 and her lasix was increased and was referred to follow-up with [facility].   She now presents on 11/19/22 to hospital with 8 lb weight gain, BLE edema, and MONTALVO. CXR showing likely pulmonary edema. NTBNP 1913. -120lbs admission weight 128 lbs. She reports she has been having increasing MONTALVO, abdominal bloating/fullness, fatigue, and weight gain since this Summer 2022. Echo here shows LVEF 60-65% with severe TR. Device interrogation shows normal device function, stable lead parameters, intrinsic AF, and  53.4%. On tele and ECGs here she is noted to have fairly frequent PVCs and short runs of NSVT. She feels palpitations/fluttering in her chest with these. Current cardiac medications include: Atenolol, Spironolactone, Digoxin, Eliquis, and Lasix.   O:   Vitals: BP (!) 152/85 (BP Location: Right leg)   Pulse 94   Temp 97.6  F (36.4  C) (Axillary)   Resp 29   Ht 1.448 m (4' 9\")   Wt 54.2 kg (119 lb 7.8 oz)   SpO2 97%   BMI 25.86 kg/m    Gen: Drowsy  Lungs: CTAB   CV:  S1S2,Reg, murmur present.    Abd: NT, ND, Soft   Ext: Trace pedal edema    Data:  Labs:  BMP  Recent Labs   Lab 11/29/22 2026 11/29/22  1944 11/29/22  1637 11/29/22  1155 11/29/22  0746 11/29/22  0344 11/29/22  0343 11/29/22  0047 11/29/22  0045 " 11/28/22 2058   *  --  128*  --   --   --  121* 120*  120*  --  119*   POTASSIUM  --   --  4.2  --   --   --  4.5 4.5  4.5  --  5.3   CHLORIDE  --   --  94*  --   --   --  88* 87*  87*  --  86*   VIKTORIYA  --   --  9.0  --   --   --  9.2 9.1  9.1  --  9.3   CO2  --   --  17*  --   --   --  16* 14*  14*  --  15*   BUN  --   --  53.1*  --   --   --  56.4* 56.3*  56.3*  --  56.8*   CR  --   --  1.39*  --   --   --  1.51* 1.50*  1.50*  --  1.54*   GLC  --  172* 136* 148* 134*   < > 114* 141*  141*   < > 183*  165*    < > = values in this interval not displayed.     CBC  Recent Labs   Lab 11/29/22  0036 11/28/22 2058 11/28/22  0928 11/28/22  0927 11/26/22  0557 11/23/22  0542   WBC 5.6 5.4  --   --  5.0 6.1   RBC 4.46 4.55  --   --  4.45 4.10   HGB 15.8 16.3 17.2 17.9* 16.0 14.8   HCT 45.4 45.7  --   --  46.0 42.3   * 100  --   --  103* 103*   MCH 35.4* 35.8*  --   --  36.0* 36.1*   MCHC 34.8 35.7  --   --  34.8 35.0   RDW 16.2* 16.2*  --   --  16.3* 16.3*   PLT 91* 102*  --   --  94* 90*     EKG:        Meds per [facility] EMR:  Current Facility-Administered Medications   Medication    acetaminophen (TYLENOL) Suppository 650 mg    acetaminophen (TYLENOL) tablet 650 mg    alum & mag hydroxide-simethicone (MAALOX) suspension 30 mL    [Held by provider] atenolol (TENORMIN) tablet 50 mg    bisacodyl (DULCOLAX) EC tablet 5 mg    Or    bisacodyl (DULCOLAX) EC tablet 10 mg    Or    bisacodyl (DULCOLAX) EC tablet 15 mg    bisacodyl (DULCOLAX) suppository 10 mg    calcium gluconate 1 g in 50 mL sodium chloride intermittent infusion (premix)    dextrose 5% infusion    [Held by provider] empagliflozin (JARDIANCE) tablet 10 mg    fentaNYL (PF) (SUBLIMAZE) injection 25 mcg    furosemide (LASIX) injection 80 mg    heparin ANTICOAGULANT injection 5,000 Units    hydroxychloroquine (PLAQUENIL) tablet 200 mg    lidocaine 2 gm in D5W 250 mL (ADULT STD)    loratadine (CLARITIN) tablet 10 mg    [Held by provider] magnesium  oxide (MAG-OX) half-tab 200 mg    melatonin tablet 5 mg    midazolam (VERSED) injection 0.5 mg    naloxone (NARCAN) injection 0.2 mg    Or    naloxone (NARCAN) injection 0.4 mg    Or    naloxone (NARCAN) injection 0.2 mg    Or    naloxone (NARCAN) injection 0.4 mg    nitroGLYcerin (NITROSTAT) sublingual tablet 0.4 mg    ondansetron (ZOFRAN ODT) ODT tab 4 mg    Or    ondansetron (ZOFRAN) injection 4 mg    oxyCODONE (ROXICODONE) tablet 5 mg    Or    oxyCODONE IR (ROXICODONE) tablet 10 mg    Patient is already receiving anticoagulation with heparin, enoxaparin (LOVENOX), warfarin (COUMADIN)  or other anticoagulant medication    polyethylene glycol (MIRALAX) Packet 17 g    [Held by provider] potassium chloride ER (KLOR-CON M) CR tablet 20 mEq    predniSONE (DELTASONE) tablet 3 mg    prochlorperazine (COMPAZINE) injection 10 mg    Or    prochlorperazine (COMPAZINE) tablet 10 mg    Or    prochlorperazine (COMPAZINE) suppository 25 mg    sodium chloride (PF) 0.9% PF flush 3 mL    sodium chloride (PF) 0.9% PF flush 3 mL    [Held by provider] spironolactone (ALDACTONE) tablet 50 mg   11/28/22 RHC/LHC:  Mild non-obstructive 30% pLAD lesion  Hemodynamics    Right Heart Catheterization:  /68/79 mmHg  HR 75 BPM  BSA 1.41 m*m    RA 25 mmHg  RV 45/25 mmHg  PA 45/25 (30) mmHg  PCW 23 mmHg  Prabha CO 1.7 L/min Normal = 4.0-8.0 L/min  Prabha CI 1.2 L/min/m2 Normal = 2.5-4.0 L/min/m2  PA sat 54%   Hgb 17.2 g/dL   PVR 3.14 Woods units  SVR 1937 dynes-sec/cm5     Right sided filling pressures are severely elevated. Left sided filling pressures are moderately elevated. Mild elevated pulmonary hypertension. Reduced cardiac output level. Hemodynamic data has been modified in Epic per physician review.       11/22/22 BRE:  Interpretation Summary  Global and regional left ventricular function is normal with an EF of 55-60%.  Mild to moderate right ventricular dilation is present. Right ventricular  function is normal.  Severe tricuspid  insufficiency is present due to failure of the valve leaflets  (septal-anterior) to coapt. There is no obvious leaflet tethering due to the  pacemaker lead. There are no valvular vegetations present.  No pericardial effusion is present.    11/20/22 Echo:  Interpretation Summary  Global and regional left ventricular function is normal with an EF of 55-60%.  Borderline right ventricular enlargement.  Global right ventricular function is normal.  Severe tricuspid insufficiency is present.  IVC diameter >2.1 cm collapsing <50% with sniff suggests a high RA pressure  estimated at 15 mmHg or greater.  This study was compared with the study from 8/26/22: No significant changes  noted. Specifically, TR is severe on both studies.   7/24/17 CMRI:     1. The LV is normal in cavity size and wall thickness. The global systolic function is normal. The LVEF is  64%. There are no regional wall motion abnormalities. No evidence of myocardial iron overload.   2. The RV is normal in cavity size. The global systolic function is normal. The RVEF is 59%.   3. There is moderate dilation of bilateral atria.   4. Tricuspid regurgitation is present, difficult to quantify due to image quality.   5. Late gadolinium enhancement imaging shows RV insertion site hyperenhancement, a non-specific finding  seen in patients with RV volume/pressure overload.   6. There is a moderate right pleural effusion and small left pleural effusion.    CONCLUSIONS: Normal Bi-Ventricular systolic function, LVEF 64% and RVE 59%. There is no evidence of  infiltrative disease. Compared to the MRI from 5/15/2017, there is no significant change.     A:   Ms. Michel is a 61 year old female who has a past medical history significant for VSD s/p repair 1969, RA, HTN, insomnia, atrial tachyarrhythmias, cardiac arrest, SSS s/p PPM 12/2019, HFpEF, mod/severe TR, DLBCL, and exudative pericardial effusion. Now presenting with clinical right sided HF, severe TR, and hypervolemia.  Noted to have fairly frequent PVCs and NSVT while in her permanent AF/AFL.      EP recommendations:   Sick Sinus Syndrome:   Atrial Fibrillation  PVC/NSVT:  1. Stroke Prophylaxis:  CHADSVASC=+HTN, +gender  2, corresponding to a 2.2% annual stroke / systemic emolism event rate. indicating need for long term oral anticoagulation.  She is on Eliquis. No bleeding issues.   2. Rate Control: Continue Atenolol. Would recommend stopping Digoxin in case contributing to arrhyhmias.   3. Rhythm Control: Cardioversion, Antiarrhythmics and/or ablation are options for rhythm control. She has had several DCCV last in 4/12/19. Notably, she had possible side effects from Sotalol (however, likely was due to underlying illness at the time and not from medication since she was found to have DBCL).  She is currently in chronic AFL/AF but is asymptomatic and LVEF preserved.    4. Had tachy-shelton syndrome and underwent PPM implant in 12/2019. Normal device function with stable lead parameters.   5. Limited AAT options given intolerance to sotalol and amiodarone. She was found to have low cardiac output and was continuing to have frequent PVCs/NSVTs. She was given IV lidocaine bolus and gtt and ectopy was suppressed. Reportedly CO/CI improved as well as diuresis. She had CNS/neuro side effects from lidocaine. We discussed we would favor ablation. We also discussed  its indications, risks, and benefits. Complications include but are not limited to vascular injury, excessive bleeding requiring blood transfusion, groin hematoma, aortic injury at the time of transseptal puncture, bleeding/injury to abdominal structures at time of epicardial access, cardiac tamponade requiring pericardiocentesis or open heart surgery, and thromboembolic complications or strokes. We also discussed that PVC ablation can be limited by inducibility of PVC at the time of EPS/Abaltion and/or the origin of PVC. Efficacy of ablation also deceases if multiple foci are  found. After an extensive discussion, the patient would like to pursue ablation in attempts to improve PVC burden and symptoms.      The patient states understanding and is agreeable with plan.   Thank you much for allowing us to participate in the care of this pleasant patient.   This patient was discussed with  and the note above reflects our joint assessment and plan.   ELIZABETH Verde CNP  Electrophysiology Consult Service  Pager: [phone]    EP STAFF NOTE  I have seen and examined the patient as part of a shared visit with GERA Verde NP.  I agree with the note above. I reviewed today's vital signs and medications. I have reviewed and discussed with the advanced practice provider their physical examination, assessment, and plan   Briefly, frequent PVCs and NSVT, severe restrictive CM  My key history/exam findings are: PVCs and runs of NSVT  The key management decisions made by me: would decrease and stop lidocaine for concern of toxicity, PVC/VT ablation.  Total time spent on patient visit, reviewing notes, imaging, labs, orders, and completing necessary documentation: 45 minutes.  >50% of visit spent on counseling patient and/or coordination of care.     Juan Eaton MD [facility]  Cardiology - Electrophysiology

## 2022-11-30 NOTE — PROGRESS NOTES
"Cardiology ICU Progress Note  11/29/2022      ASSESSMENT/PLAN:  Amelia Michel is a 62 year old adult, w/ a complex PMHx most significant for HFpEF, mildly dilated and reduced RV function, Severe TR (2/2 failure of leaflet coaptation), atrial flutter/fibrillation (on apixaban and rate control strategy), cardiac arrest (2017 likely 2/2 malignant involvement of the pericardium c/b organizing pericardial effusion), SSS s/p ppm (2019), VSD (s/p repair 1969), SSS s/p PPM (12/2019), RA, and DLBCL (s/p CHOP therapy and in remission), admitted w/ decompensated heart failure with pictutre c/b frequent non sustained ventricular tachycardia with RHC on 11/28/22 notable for cardiogenic shock with a cardiac index of 1.2 and CO of 1.6. Etiology unclear but overall picture concerning for possible restrictive/constrictive physiology in the setting of her malignancy history (albeit presumably in remission) with CAD ruled out as patient had non obstructive disease on coronary angiogram done on 11/28/22. Patient was transferred to the cardiac ICU for acute management of low output cardiogenic shock.       Interval history:   - Continued on Lidocaine at 0.75 mg/min overnight and tolerating intermittent IV diuresis with good urine output.  - CO and CI continued to improve on hemodynamic monitoring likely secondary to decreased ectopy in the setting of lidocaine therapy.   - Hyponatremia also improving in the setting of improving hemodynamics and s/p Tolvaptam  - This morning, patient reported of \"fuzziness/grogginess\". Lidocaine drip stopped this morning given concern for possible neurological side effect from Lidocaine.   - NSVT worsened with Lidocaine stopped, slight hypotensive in the evening today, Vasopressin at the bedside to be used at small doses if needed for hypotension    Plan for today:  -- Electrolyte goals: K >/= 4.5, Mg > 2.5 and Ca2+ wnl  -- Stop Lidocaine gtt this morning  -- Plan per EP for VT ablation tomorrow  -- " Follow up amyloid labs (SPEP and UPEP)      ===NEURO===  # Sedation  - None  - Noted to be mildly more somnolent on 11/29/22 in the setting of Lidocaine gtt with Gabapentin stopped. Given fuzziness while on Lidocaine, drip was stopped with improvement in neurological status  - We will continue to closely monitor.     ===CARDIOVASCULAR===  #Ambulatory cardiogenic shock  #Frequent, non-sustained ventricular tachycardia  Cardiac index consistent with ambulatory cardiogenic shock with evidence of poor end organ perfusion given her worsening renal function. Suspect her cardiac output is very diminished due to frequent ectopic, non-perfusing beats. Hopefully as her ventricular ectopy improves her cardiac output and end organ function will also improve. Has a preserved EF, so suspect that there is restrictive/constrictive physiology given her prior malignancy which is driving up her filling pressures. Additionally, patient's CO/CI noted to improve once her frequent ectopies were minimized on Lidocaine gtt, suggesting some significant contribution of her NSVTs to her poor output state.     Date CVP PA PCWP MAP SVO2 CI CO SVR PVR Therapies   11/28/22 24 48/27/34 23  46 1.1 1.6   None   11/29/22 23 46/32/37 N/A  66 1.9 2.7   Lidocaine 1 mg/hr, IV Lasix   11/29/22 21 38/17/22 20 78 68 1.9 2.8 1628 0.7 Lidocaine 1 mg/hr, IV Lasix   11/20/22 16 43/22 19 67 75 2.6 3.8 1074 0.8 Lidocaine  1 mg/hr     - Volume status: Hypervolemic will wait on diuretic dosing for now to correct electrolytes to meet the above goal   - Beta-blocker: hold for now given low output state  - ACE/ARB/ARNI: Hold given CELSA  - SGLT2: Hold given CELSA  - MRA: Hold given CELSA  - Anti-arrhythmic: Status post lidocaine gtt    - Electrophysiology consulted, appreciate recs, appreciate their assistance; plan is for Ablation on 12/1/22.  - Follow up on UPEP and SPEP  - Consider other evaluations including but not limited to more imaging studies/heart biopsy should  "status persist despite above measures.   - Closely monitor SVO2, lactate, and electrolytes  - Continue holding anticoagulation given plan for ablation tomorrow    # Severe tricuspid regurgitation  # SSS s/p PPM (2019)  # Atrial Tachyarrhythmias  # Freq PVC  # NSVT  Cardiac history dates back to May 2017 with atrial tachyarrhythmias and EF of 40-45% at that time. Patient feels as if her pacemaker has been \"firing more\" than her baseline. Device check report showing 11,136 episodes of \"NSVT\" since 9/25/22 although EGM's reveal irregular v-v intervals with similar morphology to intrinsic rhythm indicating probable AF w/ RVR. [id number] = 2 (+ htn, + gender). Recurrent NSVT noted this admission,  pt symptomatic with dyspnea and diaphoresis. Was given IV Metoprolol and IVF on early morning of 11/28/22 with minimal effect . EP consulted and recommended discontinuing digoxin. Intolerant to sotalol and amiodarone  - Hold PTA BB given shock  - S/p Lidocaine gtt 11/28pm to 11/30 am now off   - PRN EKG's   - Holding PTA Eliquis for RHC/Cors, continue to hold for now  - Holdint PTA Atenolol 2/2 low CI  - Plan for VT ablation by EP tomorrow (12/1/22)    ===PULMONARY===  # No acute issues.   - On room air without complaints (intermittently on 2 LPM via nasal cannula for mild hypoxia)    ===GASTROINTESTINAL===  # No acute issues    # Nutrition:   - Tolerating PO, will be NPO on 12/1/22 for ablation    ===RENAL===  # Acute renal failure  Baseline creatinine (0.94 - 1.16). Creatinine of 1.19 on admission (11/19) peaking at 1.83 on 11/28/22 in the setting of cardiogenic shock secondary to low output heart failure.   - Address cardiogenic shock as above   - IV diuresis as above  - Trend at lease twice daily while in the ICU goal K > 4.5, Mg > 2.5 and Ca2+  - Avoid nephrotoxic medications     # Acute on subacute hyponatremia; improving  Baseline sodium was ~136 until 4/2022 when it started to slowly downtrend to high 120s - low 130s. " Sodium on admission was 125 and with slow downtrend to 119 on 11/28/22. Sodium, slightly improved to 121 s/p acute ICU management (diuretics, lidocaine, improved cardiac output), further improvement to 127 s/p Tolvaptam and improved hemodynamics  - Tolvaptam 15 mg x1 on 11/29/22  - Monitor sodium levels  - May need to re-dose Tolvaptam at 30 mg tomorrow pending sodium levels.     ===HEME/ONC===  # History of Diffuse Large B-cell Lymphoma (DLBCL)  Diagnosed in 5/2017. S/p RCHOP and ECHOP therapy (tota 5 rounds). No history of radiation therapy. Deemed in complete remission in 2019 and graduated out of surveillance at that time.   - No acute issues at this time.   - Amyloid labs ordered given concern for restrictive/constrictive heart failure physiology, results pending    # Chronic thrombocytopenia  Baseline platelets 110s, was 106 on admission and currently at 89 on 11/30/22,  - Monitor    ===ENDOCRINE===  # No acute issues    ===INFECTIOUS DISEASE===  # No acute issues    # Antimicrobials:  N/A    ===SKIN/MSK===  # Rheumatoid arthritis  Stable  - Continue PTA Plaquenil 200 mg daily and Prednisone 3 mg daily  - Hold PTA Gabapentin in the setting of concern for increased somnolence in the setting of Lidocaine gtt.    ================================  Prophylaxis:  DVT: holding PTA   GI: N/A as patient is not intubated  Family: Updated  by bedside  Disposition: Critically Ill and in cardiogenic shock, anticipate at least 3-5 days in the ICU for continued management.   Code Status: Full (calrified with patient on 11/28/22, she is ok with advanced cardiac therapies if deemed necessary including ECMO)    ================================  Plan d/w Dr. Joel M.D., who is in agreement. Please, see staff addendum for changes/additions to the plan.    Marcos Garces MD, PhD  Cardiology Fellow  Pgr: [phone]  ===================================================================    SUBJECTIVE:   NAOE. Nursing and care  "team notes reviewed. Patient appeared slightly reported feeling more fuzzy in the morning with improved status after Lidocaine was stopped. Overall in good spirits.     OBJECTIVE:  BP (!) 152/85 (BP Location: Right leg)   Pulse 100   Temp 99.5  F (37.5  C) (Axillary)   Resp 21   Ht 1.448 m (4' 9\")   Wt 54.2 kg (119 lb 7.8 oz)   SpO2 100%   BMI 25.86 kg/m    Temp (24hrs), Av.4  F (36.3  C), Min:96.7  F (35.9  C), Max:98.1  F (36.7  C)      I/O:    Intake/Output Summary (Last 24 hours) at 2022 1406  Last data filed at 2022 1300  Gross per 24 hour   Intake 1109.37 ml   Output 1400 ml   Net -290.63 ml       Wt:   Wt Readings from Last 5 Encounters:   22 54.2 kg (119 lb 7.8 oz)   22 55.8 kg (123 lb)   22 54.7 kg (120 lb 8 oz)   22 54.9 kg (121 lb)   22 55.5 kg (122 lb 6.4 oz)     GEN: somnolent but awakens easily to voice, pleasant, and interactive, NAD  HEENT: no icterus, eomi, mmm, [place] Coby Catheter in the RIJ  CV: regular rate, irregular rhythm, normal s1/s2, diffuse 3/6 systolic murmur, CVP 16 this morning.   CHEST: poor respiratory effort, anterior exam, CTAB no rales or wheezing  ABD: soft, non-tender, normal active bowel sounds  EXTR: pulses +1. No clubbing, cyanosis or edema, arthritic distal changes  NEURO: alert oriented, speech slow but fluent/appropriate, motor grossly nonfocal    Labs: reviewed in EMR  CBC  Recent Labs   Lab 22  0351 22  0036 22  0927 22  0557   WBC 3.9* 5.6 5.4  --   --  5.0   RBC 4.55 4.46 4.55  --   --  4.45   HGB 16.3 15.8 16.3 17.2   < > 16.0   HCT 46.3 45.4 45.7  --   --  46.0   * 102* 100  --   --  103*   MCH 35.8* 35.4* 35.8*  --   --  36.0*   MCHC 35.2 34.8 35.7  --   --  34.8   RDW 16.7* 16.2* 16.2*  --   --  16.3*   PLT 89* 91* 102*  --   --  94*    < > = values in this interval not displayed.     BMP  Recent Labs   Lab 22  0405 22  0351 22  0009 " 11/29/22 2204 11/29/22 2026 11/29/22  1944 11/29/22  1637 11/29/22  0344 11/29/22  0343 11/29/22  0047 11/29/22  0045 11/28/22 2058   NA  --  127*  --  124* 126*  126*  --  128*  --  121* 120*  120*  --  119*   POTASSIUM  --  3.5  --   --  4.6  --  4.2  --  4.5 4.5  4.5  --  5.3   CHLORIDE  --  93*  --   --  94*  --  94*  --  88* 87*  87*  --  86*   CO2  --  19*  --   --  18*  --  17*  --  16* 14*  14*  --  15*   ANIONGAP  --  15  --   --  14  --  17*  --  17* 19*  19*  --  18*   * 107* 123*  --  145*   < > 136*   < > 114* 141*  141*   < > 183*  165*   BUN  --  46.2*  --   --  51.8*  --  53.1*  --  56.4* 56.3*  56.3*  --  56.8*   CR  --  1.23*  --   --  1.40*  --  1.39*  --  1.51* 1.50*  1.50*  --  1.54*   GFRESTIMATED  --  49*  --   --  42*  --  43*  --  39* 39*  39*  --  38*   VIKTORIYA  --  9.1  --   --  8.8  --  9.0  --  9.2 9.1  9.1  --  9.3   MAG  --  1.8  --   --  2.1  --  2.1  --   --  2.4*  --  2.5*   PHOS  --   --   --   --   --   --   --   --   --   --   --  5.3*    < > = values in this interval not displayed.     Troponins:     Lab Results   Component Value Date    TROPI <0.015 12/12/2019    TROPI 0.043 09/10/2017    TROPI 0.042 09/10/2017    TROPI 0.042 09/10/2017    TROPI 0.024 06/24/2017    TROPONIN 0.19 (HH) 05/29/2017     INRNo lab results found in last 7 days.    EKG  11/28/22: Atrial Fibrillation with RVR and PVCs at a ventricular rate of 138.    Echocardiogram:  BRE 11/22/22:  Interpretation Summary  Global and regional left ventricular function is normal with an EF of 55-60%.  Mild to moderate right ventricular dilation is present. Right ventricular  function is normal.  Severe tricuspid insufficiency is present due to failure of the valve leaflets  (septal-anterior) to coapt. There is no obvious leaflet tethering due to the  pacemaker lead. There are no valvular vegetations present.  No pericardial effusion is present.    Coronary Angiogram (11/28/22):  - Images reviewed by me.  Non obstructive single vessel CAD with a 30% stenosis lesion in proximal LAD    RHC (11/28/22):   RA 25 mmHg  RV 45/25 mmHg  PA 45/25 (30) mmHg  PCW 23 mmHg  Prabha CO 1.7 L/min   Prabha CI 1.2 L/min/m2   PA sat 54%   Hgb 17.2 g/dL   PVR 3.14 Woods units  SVR 1937 dynes-sec/cm5    Imaging: reviewed in EMR.    Cardiac MRI (4/12/18):   Clinical history: 58 yo female with remote h/o VSD repair, atrial tachy-arrhythmias, cardiac arrest,  lymphoma in remission, and pericarditis seen on recent MRI.   Comparison CMR: CMR from 03/2018   1. The LV is normal in cavity size and wall thickness. The global systolic function is normal. The LVEF is  54%. There are no regional wall motion abnormalities.   2. The RV is mildly dilated in cavity size. The global systolic function is normal. The RVEF is 49%.   3. Both atria are severely enlarged.  4. There is at least moderate, possibly severe tricuspid regurgitation.   5. Late gadolinium enhancement imaging shows hyperenhancement in the RV insertion site consistent with RV  pressure/volume overload.   6. There is a loculated pericardial effusion along the basal lateral and basal to mid inferior LV walls,  and along the inferior RV wall. it appears exudative in nature, and is beginning to organize. There is  diffuse pericardial enhancement.  7. There is no intracardiac thrombus or mass.  8. There is a small right pleural effusion.  CONCLUSIONS:  Loculated exudative pericardial effusion that is beginning to organize, with diffuse  pericardial enhancement consistent with ongoing inflammation. Normal LV fxn, LVEF 54% and RVEF 49%. At  least moderate, possibly severe tricuspid regurgitation. Compared to MRI from 03/2018, effusion is largely  unchanged in size (maybe a bit smaller) but is starting to organize. No change in pericardial enhancement.

## 2022-12-01 ENCOUNTER — APPOINTMENT (OUTPATIENT)
Dept: GENERAL RADIOLOGY | Facility: CLINIC | Age: 62
DRG: 273 | End: 2022-12-01
Attending: STUDENT IN AN ORGANIZED HEALTH CARE EDUCATION/TRAINING PROGRAM
Payer: COMMERCIAL

## 2022-12-01 ENCOUNTER — APPOINTMENT (OUTPATIENT)
Dept: GENERAL RADIOLOGY | Facility: CLINIC | Age: 62
DRG: 273 | End: 2022-12-01
Attending: INTERNAL MEDICINE
Payer: COMMERCIAL

## 2022-12-01 ENCOUNTER — APPOINTMENT (OUTPATIENT)
Dept: GENERAL RADIOLOGY | Facility: CLINIC | Age: 62
DRG: 273 | End: 2022-12-01
Attending: NURSE PRACTITIONER
Payer: COMMERCIAL

## 2022-12-01 LAB
ALBUMIN SERPL BCG-MCNC: 3.1 G/DL (ref 3.5–5.2)
ALBUMIN SERPL BCG-MCNC: 3.5 G/DL (ref 3.5–5.2)
ALP SERPL-CCNC: 82 U/L (ref 35–129)
ALP SERPL-CCNC: 86 U/L (ref 35–129)
ALT SERPL W P-5'-P-CCNC: 27 U/L (ref 10–50)
ALT SERPL W P-5'-P-CCNC: 27 U/L (ref 10–50)
ANION GAP SERPL CALCULATED.3IONS-SCNC: 11 MMOL/L (ref 7–15)
ANION GAP SERPL CALCULATED.3IONS-SCNC: 15 MMOL/L (ref 7–15)
AST SERPL W P-5'-P-CCNC: 37 U/L (ref 10–50)
AST SERPL W P-5'-P-CCNC: 48 U/L (ref 10–50)
BASE EXCESS BLDV CALC-SCNC: -0.1 MMOL/L (ref -7.7–1.9)
BASE EXCESS BLDV CALC-SCNC: -0.2 MMOL/L (ref -7.7–1.9)
BASE EXCESS BLDV CALC-SCNC: -0.3 MMOL/L (ref -7.7–1.9)
BASE EXCESS BLDV CALC-SCNC: -0.4 MMOL/L (ref -7.7–1.9)
BASE EXCESS BLDV CALC-SCNC: 0 MMOL/L (ref -7.7–1.9)
BASE EXCESS BLDV CALC-SCNC: 0.8 MMOL/L (ref -7.7–1.9)
BASE EXCESS BLDV CALC-SCNC: 0.8 MMOL/L (ref -7.7–1.9)
BILIRUB SERPL-MCNC: 2.4 MG/DL
BILIRUB SERPL-MCNC: 2.5 MG/DL
BUN SERPL-MCNC: 29.7 MG/DL (ref 8–23)
BUN SERPL-MCNC: 36.2 MG/DL (ref 8–23)
CA-I BLD-MCNC: 4.8 MG/DL (ref 4.4–5.2)
CALCIUM SERPL-MCNC: 7.8 MG/DL (ref 8.8–10.2)
CALCIUM SERPL-MCNC: 8.3 MG/DL (ref 8.8–10.2)
CHLORIDE SERPL-SCNC: 100 MMOL/L (ref 98–107)
CHLORIDE SERPL-SCNC: 97 MMOL/L (ref 98–107)
CREAT SERPL-MCNC: 0.95 MG/DL (ref 0.51–1.17)
CREAT SERPL-MCNC: 1.05 MG/DL (ref 0.51–1.17)
DEPRECATED HCO3 PLAS-SCNC: 18 MMOL/L (ref 22–29)
DEPRECATED HCO3 PLAS-SCNC: 19 MMOL/L (ref 22–29)
ERYTHROCYTE [DISTWIDTH] IN BLOOD BY AUTOMATED COUNT: 17 % (ref 10–15)
ERYTHROCYTE [DISTWIDTH] IN BLOOD BY AUTOMATED COUNT: 17.2 % (ref 10–15)
ERYTHROCYTE [DISTWIDTH] IN BLOOD BY AUTOMATED COUNT: 17.3 % (ref 10–15)
GFR SERPL CREATININE-BSD FRML MDRD: 60 ML/MIN/1.73M2
GFR SERPL CREATININE-BSD FRML MDRD: 67 ML/MIN/1.73M2
GLUCOSE BLDC GLUCOMTR-MCNC: 92 MG/DL (ref 70–99)
GLUCOSE SERPL-MCNC: 100 MG/DL (ref 70–99)
GLUCOSE SERPL-MCNC: 82 MG/DL (ref 70–99)
HCO3 BLDV-SCNC: 23 MMOL/L (ref 21–28)
HCO3 BLDV-SCNC: 25 MMOL/L (ref 21–28)
HCO3 BLDV-SCNC: 26 MMOL/L (ref 21–28)
HCO3 BLDV-SCNC: 26 MMOL/L (ref 21–28)
HCT VFR BLD AUTO: 39.3 % (ref 35–53)
HCT VFR BLD AUTO: 40.8 % (ref 35–53)
HCT VFR BLD AUTO: 42.2 % (ref 35–53)
HGB BLD-MCNC: 13.7 G/DL (ref 11.7–17.7)
HGB BLD-MCNC: 14.2 G/DL (ref 11.7–17.7)
HGB BLD-MCNC: 14.5 G/DL (ref 11.7–17.7)
LACTATE SERPL-SCNC: 1 MMOL/L (ref 0.7–2)
LACTATE SERPL-SCNC: 1 MMOL/L (ref 0.7–2)
LIDOCAIN SERPL-MCNC: 5 UG/ML
MAGNESIUM SERPL-MCNC: 2.1 MG/DL (ref 1.7–2.3)
MAGNESIUM SERPL-MCNC: 2.4 MG/DL (ref 1.7–2.3)
MCH RBC QN AUTO: 35.8 PG (ref 26.5–33)
MCH RBC QN AUTO: 36.1 PG (ref 26.5–33)
MCH RBC QN AUTO: 36.1 PG (ref 26.5–33)
MCHC RBC AUTO-ENTMCNC: 34.4 G/DL (ref 31.5–36.5)
MCHC RBC AUTO-ENTMCNC: 34.8 G/DL (ref 31.5–36.5)
MCHC RBC AUTO-ENTMCNC: 34.9 G/DL (ref 31.5–36.5)
MCV RBC AUTO: 103 FL (ref 78–100)
MCV RBC AUTO: 104 FL (ref 78–100)
MCV RBC AUTO: 104 FL (ref 78–100)
O2/TOTAL GAS SETTING VFR VENT: 2 %
O2/TOTAL GAS SETTING VFR VENT: 21 %
O2/TOTAL GAS SETTING VFR VENT: 21 %
OXYHGB MFR BLDV: 68 % (ref 70–75)
OXYHGB MFR BLDV: 69 % (ref 70–75)
OXYHGB MFR BLDV: 71 % (ref 70–75)
OXYHGB MFR BLDV: 76 % (ref 70–75)
OXYHGB MFR BLDV: 78 % (ref 70–75)
OXYHGB MFR BLDV: 78 % (ref 70–75)
OXYHGB MFR BLDV: 98 % (ref 70–75)
PCO2 BLDV: 34 MM HG (ref 40–50)
PCO2 BLDV: 40 MM HG (ref 40–50)
PCO2 BLDV: 41 MM HG (ref 40–50)
PCO2 BLDV: 41 MM HG (ref 40–50)
PCO2 BLDV: 42 MM HG (ref 40–50)
PCO2 BLDV: 42 MM HG (ref 40–50)
PCO2 BLDV: 43 MM HG (ref 40–50)
PH BLDV: 7.38 [PH] (ref 7.32–7.43)
PH BLDV: 7.39 [PH] (ref 7.32–7.43)
PH BLDV: 7.39 [PH] (ref 7.32–7.43)
PH BLDV: 7.4 [PH] (ref 7.32–7.43)
PH BLDV: 7.44 [PH] (ref 7.32–7.43)
PHOSPHATE SERPL-MCNC: 2 MG/DL (ref 2.5–4.5)
PLATELET # BLD AUTO: 63 10E3/UL (ref 150–450)
PLATELET # BLD AUTO: 65 10E3/UL (ref 150–450)
PLATELET # BLD AUTO: 74 10E3/UL (ref 150–450)
PO2 BLDV: 160 MM HG (ref 25–47)
PO2 BLDV: 38 MM HG (ref 25–47)
PO2 BLDV: 39 MM HG (ref 25–47)
PO2 BLDV: 40 MM HG (ref 25–47)
PO2 BLDV: 45 MM HG (ref 25–47)
PO2 BLDV: 46 MM HG (ref 25–47)
PO2 BLDV: 47 MM HG (ref 25–47)
POTASSIUM SERPL-SCNC: 4.4 MMOL/L (ref 3.4–5.3)
POTASSIUM SERPL-SCNC: 4.5 MMOL/L (ref 3.4–5.3)
PROT SERPL-MCNC: 5.7 G/DL (ref 6.4–8.3)
PROT SERPL-MCNC: 6.5 G/DL (ref 6.4–8.3)
RBC # BLD AUTO: 3.8 10E6/UL (ref 3.8–5.9)
RBC # BLD AUTO: 3.93 10E6/UL (ref 3.8–5.9)
RBC # BLD AUTO: 4.05 10E6/UL (ref 3.8–5.9)
SODIUM SERPL-SCNC: 127 MMOL/L (ref 136–145)
SODIUM SERPL-SCNC: 133 MMOL/L (ref 136–145)
WBC # BLD AUTO: 7.2 10E3/UL (ref 4–11)
WBC # BLD AUTO: 7.2 10E3/UL (ref 4–11)
WBC # BLD AUTO: 7.7 10E3/UL (ref 4–11)

## 2022-12-01 PROCEDURE — 71045 X-RAY EXAM CHEST 1 VIEW: CPT

## 2022-12-01 PROCEDURE — 71045 X-RAY EXAM CHEST 1 VIEW: CPT | Mod: 26 | Performed by: RADIOLOGY

## 2022-12-01 PROCEDURE — 84100 ASSAY OF PHOSPHORUS: CPT | Performed by: INTERNAL MEDICINE

## 2022-12-01 PROCEDURE — 84155 ASSAY OF PROTEIN SERUM: CPT | Performed by: STUDENT IN AN ORGANIZED HEALTH CARE EDUCATION/TRAINING PROGRAM

## 2022-12-01 PROCEDURE — 93654 COMPRE EP EVAL TX VT: CPT | Performed by: INTERNAL MEDICINE

## 2022-12-01 PROCEDURE — 250N000013 HC RX MED GY IP 250 OP 250 PS 637: Performed by: STUDENT IN AN ORGANIZED HEALTH CARE EDUCATION/TRAINING PROGRAM

## 2022-12-01 PROCEDURE — 93005 ELECTROCARDIOGRAM TRACING: CPT

## 2022-12-01 PROCEDURE — 84450 TRANSFERASE (AST) (SGOT): CPT | Performed by: STUDENT IN AN ORGANIZED HEALTH CARE EDUCATION/TRAINING PROGRAM

## 2022-12-01 PROCEDURE — 250N000009 HC RX 250: Performed by: INTERNAL MEDICINE

## 2022-12-01 PROCEDURE — C1733 CATH, EP, OTHR THAN COOL-TIP: HCPCS | Performed by: INTERNAL MEDICINE

## 2022-12-01 PROCEDURE — 93623 PRGRMD STIMJ&PACG IV RX NFS: CPT | Performed by: INTERNAL MEDICINE

## 2022-12-01 PROCEDURE — 85027 COMPLETE CBC AUTOMATED: CPT | Performed by: STUDENT IN AN ORGANIZED HEALTH CARE EDUCATION/TRAINING PROGRAM

## 2022-12-01 PROCEDURE — 83735 ASSAY OF MAGNESIUM: CPT | Performed by: STUDENT IN AN ORGANIZED HEALTH CARE EDUCATION/TRAINING PROGRAM

## 2022-12-01 PROCEDURE — 999N000157 HC STATISTIC RCP TIME EA 10 MIN

## 2022-12-01 PROCEDURE — 200N000002 HC R&B ICU UMMC

## 2022-12-01 PROCEDURE — 83605 ASSAY OF LACTIC ACID: CPT | Performed by: STUDENT IN AN ORGANIZED HEALTH CARE EDUCATION/TRAINING PROGRAM

## 2022-12-01 PROCEDURE — 99152 MOD SED SAME PHYS/QHP 5/>YRS: CPT | Performed by: INTERNAL MEDICINE

## 2022-12-01 PROCEDURE — 999N000015 HC STATISTIC ARTERIAL MONITORING DAILY

## 2022-12-01 PROCEDURE — 999N000155 HC STATISTIC RAPCV CVP MONITORING

## 2022-12-01 PROCEDURE — 250N000013 HC RX MED GY IP 250 OP 250 PS 637

## 2022-12-01 PROCEDURE — 999N000065 XR CHEST PORT 1 VIEW

## 2022-12-01 PROCEDURE — 02K83ZZ MAP CONDUCTION MECHANISM, PERCUTANEOUS APPROACH: ICD-10-PCS | Performed by: INTERNAL MEDICINE

## 2022-12-01 PROCEDURE — 025K3ZZ DESTRUCTION OF RIGHT VENTRICLE, PERCUTANEOUS APPROACH: ICD-10-PCS | Performed by: INTERNAL MEDICINE

## 2022-12-01 PROCEDURE — 250N000011 HC RX IP 250 OP 636: Performed by: STUDENT IN AN ORGANIZED HEALTH CARE EDUCATION/TRAINING PROGRAM

## 2022-12-01 PROCEDURE — C1894 INTRO/SHEATH, NON-LASER: HCPCS | Performed by: INTERNAL MEDICINE

## 2022-12-01 PROCEDURE — C1730 CATH, EP, 19 OR FEW ELECT: HCPCS | Performed by: INTERNAL MEDICINE

## 2022-12-01 PROCEDURE — 93010 ELECTROCARDIOGRAM REPORT: CPT | Mod: 76 | Performed by: INTERNAL MEDICINE

## 2022-12-01 PROCEDURE — 272N000001 HC OR GENERAL SUPPLY STERILE: Performed by: INTERNAL MEDICINE

## 2022-12-01 PROCEDURE — 250N000011 HC RX IP 250 OP 636: Performed by: INTERNAL MEDICINE

## 2022-12-01 PROCEDURE — 82805 BLOOD GASES W/O2 SATURATION: CPT | Performed by: STUDENT IN AN ORGANIZED HEALTH CARE EDUCATION/TRAINING PROGRAM

## 2022-12-01 PROCEDURE — 4A023FZ MEASUREMENT OF CARDIAC RHYTHM, PERCUTANEOUS APPROACH: ICD-10-PCS | Performed by: INTERNAL MEDICINE

## 2022-12-01 PROCEDURE — 80053 COMPREHEN METABOLIC PANEL: CPT | Performed by: STUDENT IN AN ORGANIZED HEALTH CARE EDUCATION/TRAINING PROGRAM

## 2022-12-01 PROCEDURE — 99153 MOD SED SAME PHYS/QHP EA: CPT | Performed by: INTERNAL MEDICINE

## 2022-12-01 PROCEDURE — C1887 CATHETER, GUIDING: HCPCS | Performed by: INTERNAL MEDICINE

## 2022-12-01 PROCEDURE — 82330 ASSAY OF CALCIUM: CPT | Performed by: INTERNAL MEDICINE

## 2022-12-01 PROCEDURE — 250N000012 HC RX MED GY IP 250 OP 636 PS 637

## 2022-12-01 PROCEDURE — 99291 CRITICAL CARE FIRST HOUR: CPT | Mod: 25 | Performed by: INTERNAL MEDICINE

## 2022-12-01 PROCEDURE — C1732 CATH, EP, DIAG/ABL, 3D/VECT: HCPCS | Performed by: INTERNAL MEDICINE

## 2022-12-01 PROCEDURE — 999N000045 HC STATISTIC DAILY SWAN MONITORING

## 2022-12-01 PROCEDURE — 258N000003 HC RX IP 258 OP 636: Performed by: INTERNAL MEDICINE

## 2022-12-01 RX ORDER — OXYCODONE AND ACETAMINOPHEN 5; 325 MG/1; MG/1
1 TABLET ORAL EVERY 4 HOURS PRN
Status: DISCONTINUED | OUTPATIENT
Start: 2022-12-01 | End: 2022-12-05 | Stop reason: HOSPADM

## 2022-12-01 RX ORDER — FUROSEMIDE 10 MG/ML
60 INJECTION INTRAMUSCULAR; INTRAVENOUS ONCE
Status: COMPLETED | OUTPATIENT
Start: 2022-12-01 | End: 2022-12-01

## 2022-12-01 RX ORDER — FUROSEMIDE 10 MG/ML
60 INJECTION INTRAMUSCULAR; INTRAVENOUS ONCE
Status: DISCONTINUED | OUTPATIENT
Start: 2022-12-02 | End: 2022-12-02

## 2022-12-01 RX ORDER — GABAPENTIN 100 MG/1
100 CAPSULE ORAL 3 TIMES DAILY
Status: DISCONTINUED | OUTPATIENT
Start: 2022-12-01 | End: 2022-12-03

## 2022-12-01 RX ORDER — FENTANYL CITRATE 50 UG/ML
INJECTION, SOLUTION INTRAMUSCULAR; INTRAVENOUS
Status: DISCONTINUED | OUTPATIENT
Start: 2022-12-01 | End: 2022-12-02

## 2022-12-01 RX ADMIN — HEPARIN SODIUM 5000 UNITS: 5000 INJECTION, SOLUTION INTRAVENOUS; SUBCUTANEOUS at 09:14

## 2022-12-01 RX ADMIN — FUROSEMIDE 60 MG: 10 INJECTION, SOLUTION INTRAVENOUS at 20:19

## 2022-12-01 RX ADMIN — LORATADINE 10 MG: 10 TABLET ORAL at 08:39

## 2022-12-01 RX ADMIN — GABAPENTIN 100 MG: 100 CAPSULE ORAL at 20:19

## 2022-12-01 RX ADMIN — HYDROXYCHLOROQUINE SULFATE 200 MG: 200 TABLET, FILM COATED ORAL at 20:19

## 2022-12-01 RX ADMIN — GABAPENTIN 100 MG: 100 CAPSULE ORAL at 08:39

## 2022-12-01 RX ADMIN — Medication 3 MG: at 08:39

## 2022-12-01 RX ADMIN — GABAPENTIN 100 MG: 100 CAPSULE ORAL at 16:59

## 2022-12-01 RX ADMIN — ACETAMINOPHEN 650 MG: 325 TABLET, FILM COATED ORAL at 21:06

## 2022-12-01 RX ADMIN — FUROSEMIDE 60 MG: 10 INJECTION, SOLUTION INTRAVENOUS at 09:14

## 2022-12-01 RX ADMIN — POTASSIUM PHOSPHATE, MONOBASIC POTASSIUM PHOSPHATE, DIBASIC 9 MMOL: 224; 236 INJECTION, SOLUTION, CONCENTRATE INTRAVENOUS at 10:51

## 2022-12-01 RX ADMIN — ACETAMINOPHEN 650 MG: 325 TABLET, FILM COATED ORAL at 09:14

## 2022-12-01 RX ADMIN — HEPARIN SODIUM 5000 UNITS: 5000 INJECTION, SOLUTION INTRAVENOUS; SUBCUTANEOUS at 21:06

## 2022-12-01 ASSESSMENT — ACTIVITIES OF DAILY LIVING (ADL)
ADLS_ACUITY_SCORE: 33
ADLS_ACUITY_SCORE: 33
ADLS_ACUITY_SCORE: 30
ADLS_ACUITY_SCORE: 33
ADLS_ACUITY_SCORE: 30
ADLS_ACUITY_SCORE: 33
ADLS_ACUITY_SCORE: 30
ADLS_ACUITY_SCORE: 33
ADLS_ACUITY_SCORE: 33
ADLS_ACUITY_SCORE: 30

## 2022-12-01 NOTE — PROGRESS NOTES
Cardiology ICU Progress Note  11/29/2022      ASSESSMENT/PLAN:  Amelia Michel is a 62 year old adult, w/ a complex PMHx most significant for HFpEF, mildly dilated and reduced RV function, Severe TR (2/2 failure of leaflet coaptation), atrial flutter/fibrillation (on apixaban and rate control strategy), cardiac arrest (2017 likely 2/2 malignant involvement of the pericardium c/b organizing pericardial effusion), SSS s/p ppm (2019), VSD (s/p repair 1969), SSS s/p PPM (12/2019), RA, and DLBCL (s/p CHOP therapy and in remission), admitted w/ decompensated heart failure with pictutre c/b frequent non sustained ventricular tachycardia with RHC on 11/28/22 notable for cardiogenic shock with a cardiac index of 1.2 and CO of 1.6. Etiology unclear but overall picture concerning for possible restrictive/constrictive physiology in the setting of her malignancy history (albeit presumably in remission) with CAD ruled out as patient had non obstructive disease on coronary angiogram done on 11/28/22. Patient was transferred to the cardiac ICU for acute management of low output cardiogenic shock.     Interval history:   - Remained off of Lidocaine overnight intermittently on low dose vasopressin for hypotension in the setting of frequent runs of VT.   - Lower extremity pain improved with Gabapentin 100 mg   - Reports that she slept a bit better last night for a short period  - SPEP and UPEP lab results back and negative, thus amyloid unlikely.     Plan for today:  -- VT ablation today by Dr. Eaton  --  x1 dose of 60 mg Lasix this morning  -- Electrolyte goals: K >/= 4.5, Mg > 2.5 and Ca2+ wnl    ===NEURO===  # Sedation  - Noted to be mildly more somnolent on 11/29/22 in the setting of Lidocaine gtt with Gabapentin stopped. Given fuzziness while on Lidocaine, drip was stopped with improvement in neurological status  - We will continue to closely monitor.     ===CARDIOVASCULAR===  #Ambulatory cardiogenic shock  #Frequent,  non-sustained ventricular tachycardia  Cardiac index consistent with ambulatory cardiogenic shock with evidence of poor end organ perfusion given her worsening renal function. Suspect her cardiac output is very diminished due to frequent ectopic, non-perfusing beats. Hopefully as her ventricular ectopy improves her cardiac output and end organ function will also improve. Has a preserved EF, so suspect that there is restrictive/constrictive physiology given her prior malignancy which is driving up her filling pressures. Additionally, patient's CO/CI noted to improve once her frequent ectopies were minimized on Lidocaine gtt, suggesting some significant contribution of her NSVTs to her poor output state.     Date CVP PA PCWP MAP SVO2 CI CO SVR PVR Therapies   11/28/22 24 48/27/34 23  46 1.1 1.6   None   11/29/22 23 46/32/37 N/A  66 1.9 2.7   Lidocaine 1 mg/hr, IV Lasix   11/29/22 21 38/17/22 20 78 68 1.9 2.8 1628 0.7 Lidocaine 1 mg/hr, IV Lasix   11/30/22 16 43/22/29 19 67 75 2.6 3.8 1074 0.8 Lidocaine  1 mg/hr   12/1/22 15 37/21/26 19 79 68 2.1 3.0 1706 2.3 Intermittent vasopressin     - Volume status: Hypervolemic, giving 60 mg IV Lasix x1 this morning  - Beta-blocker: hold for now given low output state  - ACE/ARB/ARNI: Hold given CELSA  - SGLT2: Hold given CELSA  - MRA: Hold given CELSA  - Anti-arrhythmic: Status post lidocaine gtt    - Electrophysiology consulted, appreciate recs, appreciate their assistance; plan is for Ablation on 12/1/22.  - UPEP and SPEP negative  - Consider other evaluations including but not limited to more imaging studies/heart biopsy should status persist despite above measures.   - Closely monitor SVO2, lactate, and electrolytes  - Continue holding anticoagulation given plan for ablation today    # Severe tricuspid regurgitation  # SSS s/p PPM (2019)  # Atrial Tachyarrhythmias  # Freq PVC  # NSVT  Cardiac history dates back to May 2017 with atrial tachyarrhythmias and EF of 40-45% at that  "time. Patient feels as if her pacemaker has been \"firing more\" than her baseline. Device check report showing 11,136 episodes of \"NSVT\" since 9/25/22 although EGM's reveal irregular v-v intervals with similar morphology to intrinsic rhythm indicating probable AF w/ RVR. [id number] = 2 (+ htn, + gender). Recurrent NSVT noted this admission,  pt symptomatic with dyspnea and diaphoresis. Was given IV Metoprolol and IVF on early morning of 11/28/22 with minimal effect . EP consulted and recommended discontinuing digoxin. Intolerant to sotalol and amiodarone.    - Plan for VT ablation by EP today (12/1/22)   - Hold PTA BB given shock  - S/p Lidocaine gtt 11/28pm to 11/30 am now off   - PRN EKG's   - Holding PTA Eliquis for RHC/Cors, continue to hold for now  - Holdint PTA Atenolol 2/2 low CI      ===PULMONARY===  # No acute issues.   - On room air without complaints (intermittently on 2 LPM via nasal cannula for mild hypoxia)    ===GASTROINTESTINAL===  # No acute issues    # Nutrition:   - Tolerating PO, NPO on 12/1/22 for ablation    ===RENAL===  # Acute renal failure  Baseline creatinine (0.94 - 1.16). Creatinine of 1.19 on admission (11/19) peaking at 1.83 on 11/28/22 in the setting of cardiogenic shock secondary to low output heart failure.   - Address cardiogenic shock as above   - IV diuresis as above  - Trend at lease twice daily while in the ICU goal K > 4.5, Mg > 2.5 and Ca2+  - Avoid nephrotoxic medications     # Acute on subacute hyponatremia; improving  Baseline sodium was ~136 until 4/2022 when it started to slowly downtrend to high 120s - low 130s. Sodium on admission was 125 and with slow downtrend to 119 on 11/28/22. Sodium, slightly improved to 121 s/p acute ICU management (diuretics, lidocaine, improved cardiac output), further improvement to 127 s/p Tolvaptam and improved hemodynamics  - Tolvaptam 15 mg x1 on 11/29/22  - Monitor sodium levels  - May need to re-dose Tolvaptam at 30 mg tomorrow pending " "sodium levels.     ===HEME/ONC===  # History of Diffuse Large B-cell Lymphoma (DLBCL)  Diagnosed in 2017. S/p RCHOP and ECHOP therapy (tota 5 rounds). No history of radiation therapy. Deemed in complete remission in 2019 and graduated out of surveillance at that time.   - No acute issues at this time.   - Amyloid labs ordered given concern for restrictive/constrictive heart failure physiology, results pending    # Chronic thrombocytopenia  Baseline platelets 110s, was 106 on admission and currently at 89 on 22,  - Monitor    ===ENDOCRINE===  # No acute issues    ===INFECTIOUS DISEASE===  # No acute issues    # Antimicrobials:  N/A    ===SKIN/MSK===  # Rheumatoid arthritis  Stable  - Continue PTA Plaquenil 200 mg daily and Prednisone 3 mg daily  - Hold PTA Gabapentin in the setting of concern for increased somnolence in the setting of Lidocaine gtt.    ================================  Prophylaxis:  DVT: holding PTA   GI: N/A as patient is not intubated  Family: Updated  by bedside  Disposition: Critically Ill and in cardiogenic shock, anticipate at least 3-5 days in the ICU for continued management.   Code Status: Full (calrified with patient on 22, she is ok with advanced cardiac therapies if deemed necessary including ECMO)    ================================  Plan d/w Dr. Joel M.D., who is in agreement. Please, see staff addendum for changes/additions to the plan.    Marcos Garces MD, PhD  Cardiology Fellow  Pgr: [phone]  ===================================================================    SUBJECTIVE:   NAOE. Nursing and care team notes reviewed.  Overall in good spirits. Other details above.     OBJECTIVE:  BP (!) 76/52   Pulse 100   Temp 97.6  F (36.4  C) (Oral)   Resp (!) 33   Ht 1.448 m (4' 9\")   Wt 50.7 kg (111 lb 12.4 oz)   SpO2 99%   BMI 24.19 kg/m    Temp (24hrs), Av.4  F (36.3  C), Min:96.7  F (35.9  C), Max:98.1  F (36.7  C)      I/O:    Intake/Output Summary (Last " 24 hours) at 11/29/2022 1406  Last data filed at 11/29/2022 1300  Gross per 24 hour   Intake 1109.37 ml   Output 1400 ml   Net -290.63 ml       Wt:   Wt Readings from Last 5 Encounters:   12/01/22 50.7 kg (111 lb 12.4 oz)   11/03/22 55.8 kg (123 lb)   09/16/22 54.7 kg (120 lb 8 oz)   09/14/22 54.9 kg (121 lb)   08/26/22 55.5 kg (122 lb 6.4 oz)     GEN: somnolent but awakens easily to voice, pleasant, and interactive, NAD  HEENT: no icterus, eomi, mmm, [place] Coby Catheter in the RIJ  CV: regular rate, irregular rhythm, normal s1/s2, diffuse 3/6 systolic murmur, CVP 16 this morning.   CHEST: poor respiratory effort, anterior exam, CTAB no rales or wheezing  ABD: soft, non-tender, normal active bowel sounds  EXTR: pulses +1. No clubbing, cyanosis or edema, arthritic distal changes  NEURO: alert oriented, speech slow but fluent/appropriate, motor grossly nonfocal    Labs: reviewed in EMR  CBC  Recent Labs   Lab 12/01/22  0827 12/01/22  0351 11/30/22  0351 11/29/22  0036   WBC 7.2 7.7 3.9* 5.6   RBC 3.93 3.80 4.55 4.46   HGB 14.2 13.7 16.3 15.8   HCT 40.8 39.3 46.3 45.4   * 103* 102* 102*   MCH 36.1* 36.1* 35.8* 35.4*   MCHC 34.8 34.9 35.2 34.8   RDW 17.2* 17.0* 16.7* 16.2*   PLT 74* 63* 89* 91*     BMP  Recent Labs   Lab 12/01/22  0351 11/30/22  1926 11/30/22  1557 11/30/22  1550 11/30/22  1200 11/30/22  1032 11/30/22  0405 11/30/22  0351 11/29/22  2204 11/29/22 2026 11/29/22  0045 11/28/22 2058   *  --   --  129*  --  126*  --  127*   < > 126*  126*   < > 119*   POTASSIUM 4.5 5.2  --  5.6*  --   --   --  3.5  --  4.6   < > 5.3   CHLORIDE 97*  --   --  96*  --   --   --  93*  --  94*   < > 86*   CO2 19*  --   --  21*  --   --   --  19*  --  18*   < > 15*   ANIONGAP 11  --   --  12  --   --   --  15  --  14   < > 18*   *  --  186* 172* 162*  --    < > 107*   < > 145*   < > 183*  165*   BUN 36.2*  --   --  44.5*  --   --   --  46.2*  --  51.8*   < > 56.8*   CR 1.05  --   --  1.40*  --   --   --   1.23*  --  1.40*   < > 1.54*   GFRESTIMATED 60*  --   --  42*  --   --   --  49*  --  42*   < > 38*   VIKTORIYA 8.3*  --   --  8.7*  --   --   --  9.1  --  8.8   < > 9.3   MAG 2.4*  --   --  2.6*  --   --   --  1.8  --  2.1   < > 2.5*   PHOS 2.0*  --   --   --   --   --   --   --   --   --   --  5.3*    < > = values in this interval not displayed.     Troponins:     Lab Results   Component Value Date    TROPI <0.015 12/12/2019    TROPI 0.043 09/10/2017    TROPI 0.042 09/10/2017    TROPI 0.042 09/10/2017    TROPI 0.024 06/24/2017    TROPONIN 0.19 (HH) 05/29/2017     INRNo lab results found in last 7 days.    EKG  11/28/22: Atrial Fibrillation with RVR and PVCs at a ventricular rate of 138.    Echocardiogram:  BRE 11/22/22:  Interpretation Summary  Global and regional left ventricular function is normal with an EF of 55-60%.  Mild to moderate right ventricular dilation is present. Right ventricular  function is normal.  Severe tricuspid insufficiency is present due to failure of the valve leaflets  (septal-anterior) to coapt. There is no obvious leaflet tethering due to the  pacemaker lead. There are no valvular vegetations present.  No pericardial effusion is present.    Coronary Angiogram (11/28/22):  - Images reviewed by me. Non obstructive single vessel CAD with a 30% stenosis lesion in proximal LAD    RHC (11/28/22):   RA 25 mmHg  RV 45/25 mmHg  PA 45/25 (30) mmHg  PCW 23 mmHg  Prabha CO 1.7 L/min   Prabha CI 1.2 L/min/m2   PA sat 54%   Hgb 17.2 g/dL   PVR 3.14 [place] units  SVR 1937 dynes-sec/cm5    Imaging: reviewed in EMR.    Cardiac MRI (4/12/18):   Clinical history: 58 yo female with remote h/o VSD repair, atrial tachy-arrhythmias, cardiac arrest,  lymphoma in remission, and pericarditis seen on recent MRI.   Comparison CMR: CMR from 03/2018   1. The LV is normal in cavity size and wall thickness. The global systolic function is normal. The LVEF is  54%. There are no regional wall motion abnormalities.   2. The RV is  mildly dilated in cavity size. The global systolic function is normal. The RVEF is 49%.   3. Both atria are severely enlarged.  4. There is at least moderate, possibly severe tricuspid regurgitation.   5. Late gadolinium enhancement imaging shows hyperenhancement in the RV insertion site consistent with RV  pressure/volume overload.   6. There is a loculated pericardial effusion along the basal lateral and basal to mid inferior LV walls,  and along the inferior RV wall. it appears exudative in nature, and is beginning to organize. There is  diffuse pericardial enhancement.  7. There is no intracardiac thrombus or mass.  8. There is a small right pleural effusion.  CONCLUSIONS:  Loculated exudative pericardial effusion that is beginning to organize, with diffuse  pericardial enhancement consistent with ongoing inflammation. Normal LV fxn, LVEF 54% and RVEF 49%. At  least moderate, possibly severe tricuspid regurgitation. Compared to MRI from 03/2018, effusion is largely  unchanged in size (maybe a bit smaller) but is starting to organize. No change in pericardial enhancement.

## 2022-12-01 NOTE — PROGRESS NOTES
Major Shift Events:      Neuro:A/O-  neuro WNL SAMPSON, weak, bilat numb/ting in hands andfeet baseline, see EMR for Prabha #s  CV: Vtach- afib, runs of VT intermittently, BP stable, afebrile- vaso in line should it be needed to maintain a MAP of 65, was having intermittent hypotension  Respi: on 2L for comfort, LS clear  Gi/Gu: 2gram NA diet with 2L FR, 1 large BMs today, voiding via purwick or BSC, NPO at MN      Plan: monitor labs and lytes, EP lab tomorrow     For vital signs and complete assessments, please see documentation flowsheets

## 2022-12-01 NOTE — PLAN OF CARE
Goal Outcome Evaluation:      Plan of Care Reviewed With: patient, spouse    Overall Patient Progress: no changeOverall Patient Progress: no change    Outcome Evaluation: Ablation today    Assessment: Pt A&Ox4, follows commands, up w/SBA and walker. Neuropathy pain bilat heels, well controlled w/APAP and rescheduled gabapentin. Pupils equal. Map goal >65, maintained w/o gtts. Afib w/intermittent runs of VT and bigeminal PVCs. [place] pulled back to 52 per AM CXR, repeat CXR done. Phos replaced. On 2L NC, weaning as tolerated. Purewick w/adequate UOP, resumed diuretic in AM. Last BM 11/30. Pt's spouse, Wolf, updated at bedside.     Plan: Recover from ablation today. Monitor for further ectopy. Optimize hemodynamics.     GTTs:  TKO 5ml/hr    Dm Bloom RN  Hours cared for: [id number]

## 2022-12-01 NOTE — PLAN OF CARE
Neuro: Pt A&Ox4. Denied pain. Pt up with 1 assist.     Cardiac: Afib/VT. HR 70s-130s. Vasopressin titrated to keep MAPs>60 while asleep and MAPS>65 while awake per Christian Farmer MD. Prabha numbers completed Q4hrs - see flowsheets.    Respiratory: On 2L O2/NC. Lung sounds clear/diminished.    GI/: NPO since midnight. LBM 11/30. Perwick in place - low urine output overnight.    Plan: Continue to monitor and update MD as needed. Continue plan of care.       Goal Outcome Evaluation:    Plan of Care Reviewed With: patient  Overall Patient Progress: no change

## 2022-12-02 ENCOUNTER — APPOINTMENT (OUTPATIENT)
Dept: GENERAL RADIOLOGY | Facility: CLINIC | Age: 62
DRG: 273 | End: 2022-12-02
Attending: INTERNAL MEDICINE
Payer: COMMERCIAL

## 2022-12-02 ENCOUNTER — APPOINTMENT (OUTPATIENT)
Dept: GENERAL RADIOLOGY | Facility: CLINIC | Age: 62
DRG: 273 | End: 2022-12-02
Attending: NURSE PRACTITIONER
Payer: COMMERCIAL

## 2022-12-02 ENCOUNTER — APPOINTMENT (OUTPATIENT)
Dept: OCCUPATIONAL THERAPY | Facility: CLINIC | Age: 62
DRG: 273 | End: 2022-12-02
Attending: NURSE PRACTITIONER
Payer: COMMERCIAL

## 2022-12-02 ENCOUNTER — ANCILLARY PROCEDURE (OUTPATIENT)
Dept: CARDIOLOGY | Facility: CLINIC | Age: 62
DRG: 273 | End: 2022-12-02
Attending: NURSE PRACTITIONER
Payer: COMMERCIAL

## 2022-12-02 LAB
ALBUMIN SERPL BCG-MCNC: 3.4 G/DL (ref 3.5–5.2)
ALBUMIN SERPL BCG-MCNC: 3.5 G/DL (ref 3.5–5.2)
ALBUMIN SERPL BCG-MCNC: 3.7 G/DL (ref 3.5–5.2)
ALP SERPL-CCNC: 121 U/L (ref 35–129)
ALP SERPL-CCNC: 82 U/L (ref 35–129)
ALP SERPL-CCNC: 99 U/L (ref 35–129)
ALT SERPL W P-5'-P-CCNC: 27 U/L (ref 10–50)
ALT SERPL W P-5'-P-CCNC: 30 U/L (ref 10–50)
ALT SERPL W P-5'-P-CCNC: 36 U/L (ref 10–50)
ANION GAP SERPL CALCULATED.3IONS-SCNC: 11 MMOL/L (ref 7–15)
ANION GAP SERPL CALCULATED.3IONS-SCNC: 12 MMOL/L (ref 7–15)
ANION GAP SERPL CALCULATED.3IONS-SCNC: 14 MMOL/L (ref 7–15)
AST SERPL W P-5'-P-CCNC: 47 U/L (ref 10–50)
AST SERPL W P-5'-P-CCNC: 53 U/L (ref 10–50)
AST SERPL W P-5'-P-CCNC: ABNORMAL U/L
ATRIAL RATE - MUSE: 100 BPM
ATRIAL RATE - MUSE: 92 BPM
BASE EXCESS BLDV CALC-SCNC: 1 MMOL/L (ref -7.7–1.9)
BASE EXCESS BLDV CALC-SCNC: 1.9 MMOL/L (ref -7.7–1.9)
BASE EXCESS BLDV CALC-SCNC: 2 MMOL/L (ref -7.7–1.9)
BASE EXCESS BLDV CALC-SCNC: 3.5 MMOL/L (ref -7.7–1.9)
BILIRUB SERPL-MCNC: 2.3 MG/DL
BILIRUB SERPL-MCNC: 2.4 MG/DL
BILIRUB SERPL-MCNC: 2.6 MG/DL
BUN SERPL-MCNC: 25.7 MG/DL (ref 8–23)
BUN SERPL-MCNC: 26.4 MG/DL (ref 8–23)
BUN SERPL-MCNC: 27.7 MG/DL (ref 8–23)
CALCIUM SERPL-MCNC: 7.8 MG/DL (ref 8.8–10.2)
CALCIUM SERPL-MCNC: 8 MG/DL (ref 8.8–10.2)
CALCIUM SERPL-MCNC: 8.4 MG/DL (ref 8.8–10.2)
CHLORIDE SERPL-SCNC: 95 MMOL/L (ref 98–107)
CHLORIDE SERPL-SCNC: 96 MMOL/L (ref 98–107)
CHLORIDE SERPL-SCNC: 99 MMOL/L (ref 98–107)
CREAT SERPL-MCNC: 0.82 MG/DL (ref 0.51–1.17)
CREAT SERPL-MCNC: 0.91 MG/DL (ref 0.51–1.17)
CREAT SERPL-MCNC: 0.94 MG/DL (ref 0.51–1.17)
DEPRECATED HCO3 PLAS-SCNC: 18 MMOL/L (ref 22–29)
DEPRECATED HCO3 PLAS-SCNC: 21 MMOL/L (ref 22–29)
DEPRECATED HCO3 PLAS-SCNC: 21 MMOL/L (ref 22–29)
DIASTOLIC BLOOD PRESSURE - MUSE: NORMAL MMHG
DIASTOLIC BLOOD PRESSURE - MUSE: NORMAL MMHG
DIGOXIN SERPL-MCNC: <0.4 NG/ML (ref 0.6–2)
ERYTHROCYTE [DISTWIDTH] IN BLOOD BY AUTOMATED COUNT: 17.3 % (ref 10–15)
GFR SERPL CREATININE-BSD FRML MDRD: 68 ML/MIN/1.73M2
GFR SERPL CREATININE-BSD FRML MDRD: 71 ML/MIN/1.73M2
GFR SERPL CREATININE-BSD FRML MDRD: 80 ML/MIN/1.73M2
GLUCOSE SERPL-MCNC: 116 MG/DL (ref 70–99)
GLUCOSE SERPL-MCNC: 137 MG/DL (ref 70–99)
GLUCOSE SERPL-MCNC: 68 MG/DL (ref 70–99)
HCO3 BLDV-SCNC: 26 MMOL/L (ref 21–28)
HCO3 BLDV-SCNC: 26 MMOL/L (ref 21–28)
HCO3 BLDV-SCNC: 27 MMOL/L (ref 21–28)
HCO3 BLDV-SCNC: 28 MMOL/L (ref 21–28)
HCT VFR BLD AUTO: 41.5 % (ref 35–53)
HGB BLD-MCNC: 14.4 G/DL (ref 11.7–17.7)
INTERPRETATION ECG - MUSE: NORMAL
INTERPRETATION ECG - MUSE: NORMAL
LACTATE SERPL-SCNC: 1.2 MMOL/L (ref 0.7–2)
LACTATE SERPL-SCNC: 1.8 MMOL/L (ref 0.7–2)
LACTATE SERPL-SCNC: 2.5 MMOL/L (ref 0.7–2)
MAGNESIUM SERPL-MCNC: 2 MG/DL (ref 1.7–2.3)
MAGNESIUM SERPL-MCNC: 2.4 MG/DL (ref 1.7–2.3)
MCH RBC QN AUTO: 36.5 PG (ref 26.5–33)
MCHC RBC AUTO-ENTMCNC: 34.7 G/DL (ref 31.5–36.5)
MCV RBC AUTO: 105 FL (ref 78–100)
MDC_IDC_EPISODE_DTM: NORMAL
MDC_IDC_EPISODE_DURATION: 0 S
MDC_IDC_EPISODE_DURATION: 1 S
MDC_IDC_EPISODE_DURATION: 11 S
MDC_IDC_EPISODE_DURATION: 11 S
MDC_IDC_EPISODE_DURATION: 13 S
MDC_IDC_EPISODE_DURATION: 13 S
MDC_IDC_EPISODE_DURATION: 17 S
MDC_IDC_EPISODE_DURATION: 18 S
MDC_IDC_EPISODE_DURATION: 19 S
MDC_IDC_EPISODE_DURATION: 19 S
MDC_IDC_EPISODE_DURATION: 2 S
MDC_IDC_EPISODE_DURATION: 20 S
MDC_IDC_EPISODE_DURATION: 21 S
MDC_IDC_EPISODE_DURATION: 21 S
MDC_IDC_EPISODE_DURATION: 22 S
MDC_IDC_EPISODE_DURATION: 23 S
MDC_IDC_EPISODE_DURATION: 24 S
MDC_IDC_EPISODE_DURATION: 25 S
MDC_IDC_EPISODE_DURATION: 26 S
MDC_IDC_EPISODE_DURATION: 27 S
MDC_IDC_EPISODE_DURATION: 28 S
MDC_IDC_EPISODE_DURATION: 30 S
MDC_IDC_EPISODE_DURATION: 31 S
MDC_IDC_EPISODE_DURATION: 32 S
MDC_IDC_EPISODE_DURATION: 36 S
MDC_IDC_EPISODE_DURATION: 37 S
MDC_IDC_EPISODE_DURATION: 39 S
MDC_IDC_EPISODE_DURATION: 40 S
MDC_IDC_EPISODE_DURATION: 44 S
MDC_IDC_EPISODE_DURATION: 45 S
MDC_IDC_EPISODE_DURATION: 47 S
MDC_IDC_EPISODE_DURATION: 48 S
MDC_IDC_EPISODE_DURATION: 49 S
MDC_IDC_EPISODE_DURATION: 61 S
MDC_IDC_EPISODE_DURATION: 62 S
MDC_IDC_EPISODE_DURATION: 63 S
MDC_IDC_EPISODE_DURATION: 72 S
MDC_IDC_EPISODE_DURATION: 89 S
MDC_IDC_EPISODE_ID: NORMAL
MDC_IDC_EPISODE_TYPE: NORMAL
MDC_IDC_LEAD_IMPLANT_DT: NORMAL
MDC_IDC_LEAD_IMPLANT_DT: NORMAL
MDC_IDC_LEAD_LOCATION: NORMAL
MDC_IDC_LEAD_LOCATION: NORMAL
MDC_IDC_LEAD_LOCATION_DETAIL_1: NORMAL
MDC_IDC_LEAD_LOCATION_DETAIL_1: NORMAL
MDC_IDC_LEAD_MFG: NORMAL
MDC_IDC_LEAD_MFG: NORMAL
MDC_IDC_LEAD_MODEL: NORMAL
MDC_IDC_LEAD_MODEL: NORMAL
MDC_IDC_LEAD_POLARITY_TYPE: NORMAL
MDC_IDC_LEAD_POLARITY_TYPE: NORMAL
MDC_IDC_LEAD_SERIAL: NORMAL
MDC_IDC_LEAD_SERIAL: NORMAL
MDC_IDC_LEAD_SPECIAL_FUNCTION: NORMAL
MDC_IDC_LEAD_SPECIAL_FUNCTION: NORMAL
MDC_IDC_MSMT_BATTERY_DTM: NORMAL
MDC_IDC_MSMT_BATTERY_REMAINING_LONGEVITY: 134 MO
MDC_IDC_MSMT_BATTERY_RRT_TRIGGER: 2.62
MDC_IDC_MSMT_BATTERY_STATUS: NORMAL
MDC_IDC_MSMT_BATTERY_VOLTAGE: 3.02 V
MDC_IDC_MSMT_LEADCHNL_RA_IMPEDANCE_VALUE: 342 OHM
MDC_IDC_MSMT_LEADCHNL_RA_IMPEDANCE_VALUE: 437 OHM
MDC_IDC_MSMT_LEADCHNL_RV_IMPEDANCE_VALUE: 323 OHM
MDC_IDC_MSMT_LEADCHNL_RV_IMPEDANCE_VALUE: 437 OHM
MDC_IDC_MSMT_LEADCHNL_RV_PACING_THRESHOLD_AMPLITUDE: 0.75 V
MDC_IDC_MSMT_LEADCHNL_RV_PACING_THRESHOLD_PULSEWIDTH: 0.4 MS
MDC_IDC_MSMT_LEADCHNL_RV_SENSING_INTR_AMPL: 7 MV
MDC_IDC_PG_IMPLANT_DTM: NORMAL
MDC_IDC_PG_MFG: NORMAL
MDC_IDC_PG_MODEL: NORMAL
MDC_IDC_PG_SERIAL: NORMAL
MDC_IDC_PG_TYPE: NORMAL
MDC_IDC_SESS_CLINIC_NAME: NORMAL
MDC_IDC_SESS_DTM: NORMAL
MDC_IDC_SESS_TYPE: NORMAL
MDC_IDC_SET_BRADY_HYSTRATE: NORMAL
MDC_IDC_SET_BRADY_LOWRATE: 60 {BEATS}/MIN
MDC_IDC_SET_BRADY_MAX_SENSOR_RATE: 130 {BEATS}/MIN
MDC_IDC_SET_BRADY_MODE: NORMAL
MDC_IDC_SET_LEADCHNL_RA_SENSING_ANODE_ELECTRODE_1: NORMAL
MDC_IDC_SET_LEADCHNL_RA_SENSING_ANODE_LOCATION_1: NORMAL
MDC_IDC_SET_LEADCHNL_RA_SENSING_CATHODE_ELECTRODE_1: NORMAL
MDC_IDC_SET_LEADCHNL_RA_SENSING_CATHODE_LOCATION_1: NORMAL
MDC_IDC_SET_LEADCHNL_RA_SENSING_POLARITY: NORMAL
MDC_IDC_SET_LEADCHNL_RA_SENSING_SENSITIVITY: NORMAL
MDC_IDC_SET_LEADCHNL_RV_PACING_AMPLITUDE: 2 V
MDC_IDC_SET_LEADCHNL_RV_PACING_ANODE_ELECTRODE_1: NORMAL
MDC_IDC_SET_LEADCHNL_RV_PACING_ANODE_LOCATION_1: NORMAL
MDC_IDC_SET_LEADCHNL_RV_PACING_CAPTURE_MODE: NORMAL
MDC_IDC_SET_LEADCHNL_RV_PACING_CATHODE_ELECTRODE_1: NORMAL
MDC_IDC_SET_LEADCHNL_RV_PACING_CATHODE_LOCATION_1: NORMAL
MDC_IDC_SET_LEADCHNL_RV_PACING_POLARITY: NORMAL
MDC_IDC_SET_LEADCHNL_RV_PACING_PULSEWIDTH: 0.4 MS
MDC_IDC_SET_LEADCHNL_RV_SENSING_ANODE_ELECTRODE_1: NORMAL
MDC_IDC_SET_LEADCHNL_RV_SENSING_ANODE_LOCATION_1: NORMAL
MDC_IDC_SET_LEADCHNL_RV_SENSING_CATHODE_ELECTRODE_1: NORMAL
MDC_IDC_SET_LEADCHNL_RV_SENSING_CATHODE_LOCATION_1: NORMAL
MDC_IDC_SET_LEADCHNL_RV_SENSING_POLARITY: NORMAL
MDC_IDC_SET_LEADCHNL_RV_SENSING_SENSITIVITY: 0.9 MV
MDC_IDC_SET_ZONE_DETECTION_INTERVAL: 400 MS
MDC_IDC_SET_ZONE_TYPE: NORMAL
MDC_IDC_STAT_BRADY_AP_VP_PERCENT: 0 %
MDC_IDC_STAT_BRADY_AP_VS_PERCENT: 0 %
MDC_IDC_STAT_BRADY_AS_VP_PERCENT: 28.4 %
MDC_IDC_STAT_BRADY_AS_VS_PERCENT: 71.61 %
MDC_IDC_STAT_BRADY_DTM_END: NORMAL
MDC_IDC_STAT_BRADY_DTM_START: NORMAL
MDC_IDC_STAT_BRADY_RA_PERCENT_PACED: 0 %
MDC_IDC_STAT_BRADY_RV_PERCENT_PACED: 28.4 %
MDC_IDC_STAT_EPISODE_RECENT_COUNT: 0
MDC_IDC_STAT_EPISODE_RECENT_COUNT: 204
MDC_IDC_STAT_EPISODE_RECENT_COUNT: NORMAL
MDC_IDC_STAT_EPISODE_RECENT_COUNT_DTM_END: NORMAL
MDC_IDC_STAT_EPISODE_RECENT_COUNT_DTM_START: NORMAL
MDC_IDC_STAT_EPISODE_TOTAL_COUNT: 209
MDC_IDC_STAT_EPISODE_TOTAL_COUNT: 3691
MDC_IDC_STAT_EPISODE_TOTAL_COUNT: NORMAL
MDC_IDC_STAT_EPISODE_TOTAL_COUNT_DTM_END: NORMAL
MDC_IDC_STAT_EPISODE_TOTAL_COUNT_DTM_START: NORMAL
MDC_IDC_STAT_EPISODE_TYPE: NORMAL
O2/TOTAL GAS SETTING VFR VENT: 21 %
OXYHGB MFR BLDV: 67 % (ref 70–75)
OXYHGB MFR BLDV: 71 % (ref 70–75)
OXYHGB MFR BLDV: 72 % (ref 70–75)
OXYHGB MFR BLDV: 78 % (ref 70–75)
P AXIS - MUSE: NORMAL DEGREES
P AXIS - MUSE: NORMAL DEGREES
PCO2 BLDV: 39 MM HG (ref 40–50)
PCO2 BLDV: 41 MM HG (ref 40–50)
PCO2 BLDV: 41 MM HG (ref 40–50)
PCO2 BLDV: 43 MM HG (ref 40–50)
PH BLDV: 7.4 [PH] (ref 7.32–7.43)
PH BLDV: 7.42 [PH] (ref 7.32–7.43)
PH BLDV: 7.44 [PH] (ref 7.32–7.43)
PH BLDV: 7.44 [PH] (ref 7.32–7.43)
PHOSPHATE SERPL-MCNC: 2.1 MG/DL (ref 2.5–4.5)
PHOSPHATE SERPL-MCNC: 2.9 MG/DL (ref 2.5–4.5)
PLATELET # BLD AUTO: 62 10E3/UL (ref 150–450)
PO2 BLDV: 36 MM HG (ref 25–47)
PO2 BLDV: 39 MM HG (ref 25–47)
PO2 BLDV: 40 MM HG (ref 25–47)
PO2 BLDV: 46 MM HG (ref 25–47)
POTASSIUM SERPL-SCNC: 4 MMOL/L (ref 3.4–5.3)
POTASSIUM SERPL-SCNC: 4.3 MMOL/L (ref 3.4–5.3)
POTASSIUM SERPL-SCNC: 5.3 MMOL/L (ref 3.4–5.3)
PR INTERVAL - MUSE: NORMAL MS
PR INTERVAL - MUSE: NORMAL MS
PROT SERPL-MCNC: 6.3 G/DL (ref 6.4–8.3)
PROT SERPL-MCNC: 6.5 G/DL (ref 6.4–8.3)
PROT SERPL-MCNC: 7 G/DL (ref 6.4–8.3)
QRS DURATION - MUSE: 88 MS
QRS DURATION - MUSE: 94 MS
QT - MUSE: 372 MS
QT - MUSE: 376 MS
QTC - MUSE: 449 MS
QTC - MUSE: 457 MS
R AXIS - MUSE: 114 DEGREES
R AXIS - MUSE: 95 DEGREES
RBC # BLD AUTO: 3.95 10E6/UL (ref 3.8–5.9)
SODIUM SERPL-SCNC: 128 MMOL/L (ref 136–145)
SODIUM SERPL-SCNC: 128 MMOL/L (ref 136–145)
SODIUM SERPL-SCNC: 131 MMOL/L (ref 136–145)
SYSTOLIC BLOOD PRESSURE - MUSE: NORMAL MMHG
SYSTOLIC BLOOD PRESSURE - MUSE: NORMAL MMHG
T AXIS - MUSE: -54 DEGREES
T AXIS - MUSE: -74 DEGREES
VENTRICULAR RATE- MUSE: 86 BPM
VENTRICULAR RATE- MUSE: 91 BPM
WBC # BLD AUTO: 6.7 10E3/UL (ref 4–11)

## 2022-12-02 PROCEDURE — 80053 COMPREHEN METABOLIC PANEL: CPT | Performed by: STUDENT IN AN ORGANIZED HEALTH CARE EDUCATION/TRAINING PROGRAM

## 2022-12-02 PROCEDURE — 250N000011 HC RX IP 250 OP 636: Performed by: STUDENT IN AN ORGANIZED HEALTH CARE EDUCATION/TRAINING PROGRAM

## 2022-12-02 PROCEDURE — 84155 ASSAY OF PROTEIN SERUM: CPT | Performed by: STUDENT IN AN ORGANIZED HEALTH CARE EDUCATION/TRAINING PROGRAM

## 2022-12-02 PROCEDURE — 97535 SELF CARE MNGMENT TRAINING: CPT | Mod: GO

## 2022-12-02 PROCEDURE — 83605 ASSAY OF LACTIC ACID: CPT | Performed by: STUDENT IN AN ORGANIZED HEALTH CARE EDUCATION/TRAINING PROGRAM

## 2022-12-02 PROCEDURE — 999N000157 HC STATISTIC RCP TIME EA 10 MIN

## 2022-12-02 PROCEDURE — 71045 X-RAY EXAM CHEST 1 VIEW: CPT | Mod: 26 | Performed by: STUDENT IN AN ORGANIZED HEALTH CARE EDUCATION/TRAINING PROGRAM

## 2022-12-02 PROCEDURE — 80162 ASSAY OF DIGOXIN TOTAL: CPT | Performed by: STUDENT IN AN ORGANIZED HEALTH CARE EDUCATION/TRAINING PROGRAM

## 2022-12-02 PROCEDURE — 83735 ASSAY OF MAGNESIUM: CPT | Performed by: STUDENT IN AN ORGANIZED HEALTH CARE EDUCATION/TRAINING PROGRAM

## 2022-12-02 PROCEDURE — 999N000045 HC STATISTIC DAILY SWAN MONITORING

## 2022-12-02 PROCEDURE — 99291 CRITICAL CARE FIRST HOUR: CPT | Mod: 25 | Performed by: INTERNAL MEDICINE

## 2022-12-02 PROCEDURE — 999N000155 HC STATISTIC RAPCV CVP MONITORING

## 2022-12-02 PROCEDURE — 93280 PM DEVICE PROGR EVAL DUAL: CPT | Mod: 26 | Performed by: INTERNAL MEDICINE

## 2022-12-02 PROCEDURE — 250N000011 HC RX IP 250 OP 636: Performed by: INTERNAL MEDICINE

## 2022-12-02 PROCEDURE — 999N000015 HC STATISTIC ARTERIAL MONITORING DAILY

## 2022-12-02 PROCEDURE — 99232 SBSQ HOSP IP/OBS MODERATE 35: CPT | Mod: 25 | Performed by: INTERNAL MEDICINE

## 2022-12-02 PROCEDURE — 85027 COMPLETE CBC AUTOMATED: CPT | Performed by: STUDENT IN AN ORGANIZED HEALTH CARE EDUCATION/TRAINING PROGRAM

## 2022-12-02 PROCEDURE — 250N000013 HC RX MED GY IP 250 OP 250 PS 637: Performed by: STUDENT IN AN ORGANIZED HEALTH CARE EDUCATION/TRAINING PROGRAM

## 2022-12-02 PROCEDURE — 250N000013 HC RX MED GY IP 250 OP 250 PS 637: Performed by: INTERNAL MEDICINE

## 2022-12-02 PROCEDURE — 200N000002 HC R&B ICU UMMC

## 2022-12-02 PROCEDURE — 71046 X-RAY EXAM CHEST 2 VIEWS: CPT | Mod: 26 | Performed by: RADIOLOGY

## 2022-12-02 PROCEDURE — 71045 X-RAY EXAM CHEST 1 VIEW: CPT

## 2022-12-02 PROCEDURE — 250N000012 HC RX MED GY IP 250 OP 636 PS 637

## 2022-12-02 PROCEDURE — 93280 PM DEVICE PROGR EVAL DUAL: CPT

## 2022-12-02 PROCEDURE — 250N000013 HC RX MED GY IP 250 OP 250 PS 637

## 2022-12-02 PROCEDURE — 84100 ASSAY OF PHOSPHORUS: CPT | Performed by: INTERNAL MEDICINE

## 2022-12-02 PROCEDURE — 999N000065 XR CHEST 2 VIEWS

## 2022-12-02 PROCEDURE — 82805 BLOOD GASES W/O2 SATURATION: CPT | Performed by: STUDENT IN AN ORGANIZED HEALTH CARE EDUCATION/TRAINING PROGRAM

## 2022-12-02 RX ORDER — MAGNESIUM SULFATE HEPTAHYDRATE 40 MG/ML
2 INJECTION, SOLUTION INTRAVENOUS ONCE
Status: COMPLETED | OUTPATIENT
Start: 2022-12-02 | End: 2022-12-02

## 2022-12-02 RX ORDER — ATENOLOL 25 MG/1
25 TABLET ORAL ONCE
Status: COMPLETED | OUTPATIENT
Start: 2022-12-02 | End: 2022-12-02

## 2022-12-02 RX ORDER — FUROSEMIDE 10 MG/ML
60 INJECTION INTRAMUSCULAR; INTRAVENOUS ONCE
Status: COMPLETED | OUTPATIENT
Start: 2022-12-02 | End: 2022-12-02

## 2022-12-02 RX ORDER — ATENOLOL 25 MG/1
25 TABLET ORAL 2 TIMES DAILY
Status: DISCONTINUED | OUTPATIENT
Start: 2022-12-02 | End: 2022-12-04

## 2022-12-02 RX ORDER — DIGOXIN 125 MCG
125 TABLET ORAL DAILY
Status: DISCONTINUED | OUTPATIENT
Start: 2022-12-02 | End: 2022-12-02

## 2022-12-02 RX ORDER — FUROSEMIDE 10 MG/ML
60 INJECTION INTRAMUSCULAR; INTRAVENOUS ONCE
Status: DISCONTINUED | OUTPATIENT
Start: 2022-12-02 | End: 2022-12-02

## 2022-12-02 RX ADMIN — ACETAMINOPHEN 650 MG: 325 TABLET, FILM COATED ORAL at 21:46

## 2022-12-02 RX ADMIN — GABAPENTIN 100 MG: 100 CAPSULE ORAL at 20:24

## 2022-12-02 RX ADMIN — POTASSIUM & SODIUM PHOSPHATES POWDER PACK 280-160-250 MG 1 PACKET: 280-160-250 PACK at 07:01

## 2022-12-02 RX ADMIN — ATENOLOL 25 MG: 25 TABLET ORAL at 16:04

## 2022-12-02 RX ADMIN — FUROSEMIDE 60 MG: 10 INJECTION, SOLUTION INTRAVENOUS at 11:30

## 2022-12-02 RX ADMIN — POTASSIUM & SODIUM PHOSPHATES POWDER PACK 280-160-250 MG 1 PACKET: 280-160-250 PACK at 15:01

## 2022-12-02 RX ADMIN — MAGNESIUM SULFATE IN WATER 2 G: 40 INJECTION, SOLUTION INTRAVENOUS at 07:01

## 2022-12-02 RX ADMIN — GABAPENTIN 100 MG: 100 CAPSULE ORAL at 15:01

## 2022-12-02 RX ADMIN — ATENOLOL 25 MG: 25 TABLET ORAL at 20:24

## 2022-12-02 RX ADMIN — Medication 3 MG: at 08:44

## 2022-12-02 RX ADMIN — HYDROXYCHLOROQUINE SULFATE 200 MG: 200 TABLET, FILM COATED ORAL at 20:24

## 2022-12-02 RX ADMIN — GABAPENTIN 100 MG: 100 CAPSULE ORAL at 08:44

## 2022-12-02 RX ADMIN — HEPARIN SODIUM 5000 UNITS: 5000 INJECTION, SOLUTION INTRAVENOUS; SUBCUTANEOUS at 21:46

## 2022-12-02 RX ADMIN — EMPAGLIFLOZIN 10 MG: 10 TABLET, FILM COATED ORAL at 16:04

## 2022-12-02 RX ADMIN — LORATADINE 10 MG: 10 TABLET ORAL at 08:43

## 2022-12-02 RX ADMIN — HEPARIN SODIUM 5000 UNITS: 5000 INJECTION, SOLUTION INTRAVENOUS; SUBCUTANEOUS at 11:27

## 2022-12-02 RX ADMIN — POTASSIUM & SODIUM PHOSPHATES POWDER PACK 280-160-250 MG 1 PACKET: 280-160-250 PACK at 11:26

## 2022-12-02 ASSESSMENT — ACTIVITIES OF DAILY LIVING (ADL)
ADLS_ACUITY_SCORE: 33

## 2022-12-02 NOTE — PLAN OF CARE
Major Shift Events:  Neuro intact. SAMPSON. Afebrile. Afib . No pressors. Abdoul @ 52. Last CI 2.6 SVR 1479. Svo2 72. RA. 2G Na diet, 2L FR. Mag and phos replaced.     Plan: Monitor vitals, continue current POC.   For vital signs and complete assessments, please see documentation flowsheets.     Problem: Heart Failure  Goal: Optimal Coping  Outcome: Progressing  Goal: Optimal Cardiac Output  Outcome: Progressing  Goal: Stable Heart Rate and Rhythm  Outcome: Progressing  Goal: Optimal Functional Ability  Outcome: Progressing  Intervention: Optimize Functional Ability  Recent Flowsheet Documentation  Taken 12/2/2022 0400 by Francisco Javier Davenport, RN  Activity Management:   activity adjusted per tolerance   activity encouraged  Taken 12/2/2022 0000 by Francisco Javier Davenport, RN  Activity Management:   activity adjusted per tolerance   activity encouraged  Taken 12/1/2022 2000 by Francisco Javier Davenport, RN  Activity Management:   activity adjusted per tolerance   activity encouraged   Goal Outcome Evaluation:

## 2022-12-02 NOTE — PLAN OF CARE
Goal Outcome Evaluation:      Plan of Care Reviewed With: patient, spouse    Overall Patient Progress: improvingOverall Patient Progress: improving    Outcome Evaluation: Patient off of cardiac gtts; Stable ANTONIO numbers; Remains on RA with SpO2 98%. Up to chair multiple times. Able to make needs known.     Plan  Optimize HF medication regiment   2L fluid restriction; 1900 fluid balance of 500cc  MAP > 65

## 2022-12-02 NOTE — PLAN OF CARE
Major Shift Events [id number]: VT ablation done at [facility]. Right femoral groin site is clean, dry,no hematoma. Bedrest x 4 hours which ends at 1930. Prabha numbers recorded.   Plan: Bedrest until 1930. Optimize hemodynamics.   For vital signs and complete assessments, please see documentation flowsheets.

## 2022-12-02 NOTE — PROGRESS NOTES
"SPIRITUAL HEALTH SERVICES  SPIRITUAL ASSESSMENT Progress Note  [facility] ([place]) 4E     REFERRAL SOURCE: Follow up    Pt and  were present at time of visit.  Pt said that she has been feeling \"much better\" and that she has a \"more positive outlook now.\"  Pt's  said he is grateful that things are moving in the right direction.  They shared that they appreciate  support but had no needs at this time.     PLAN: [facility] will continue to follow     Gala Rosales  Pager: [phone]    "

## 2022-12-02 NOTE — PROGRESS NOTES
Cardiology ICU Progress Note  11/29/2022      ASSESSMENT/PLAN:  Amelia Michel is a 62 year old adult, w/ a complex PMHx most significant for HFpEF, mildly dilated and reduced RV function, Severe TR (2/2 failure of leaflet coaptation), atrial flutter/fibrillation (on apixaban and rate control strategy), cardiac arrest (2017 likely 2/2 malignant involvement of the pericardium c/b organizing pericardial effusion), SSS s/p ppm (2019), VSD (s/p repair 1969), SSS s/p PPM (12/2019), RA, and DLBCL (s/p CHOP therapy and in remission), admitted w/ decompensated heart failure with pictutre c/b frequent non sustained ventricular tachycardia with RHC on 11/28/22 notable for cardiogenic shock with a cardiac index of 1.2 and CO of 1.6. Etiology unclear but overall picture concerning for possible restrictive/constrictive physiology in the setting of her malignancy history (albeit presumably in remission) with CAD ruled out as patient had non obstructive disease on coronary angiogram done on 11/28/22. Patient was transferred to the cardiac ICU for acute management of low output cardiogenic shock.     Interval history:   - Successful VT ablation yesterday. Heart rhythm now Atrial fibrillation with rare PVCs. Reports that she feels better this morning compared to previous.    Plan for today:  -- Give x1 60 mg IV Lasix this morning for slightly elevated filling pressures  -- Start PTA Atenolol at half home dose for AFib  -- Restart PTA Apixaban tonight for anticoagulation  -- Restart PTA Empagliflozin today    ===NEURO===  # Sedation  - Noted to be mildly more somnolent on 11/29/22 in the setting of Lidocaine gtt with Gabapentin stopped. Given fuzziness while on Lidocaine, drip was stopped with improvement in neurological status  - We will continue to closely monitor.     ===CARDIOVASCULAR===  #Ambulatory cardiogenic shock  #Frequent, non-sustained ventricular tachycardia  Cardiac index consistent with ambulatory cardiogenic shock  "with evidence of poor end organ perfusion given her worsening renal function. Suspect her cardiac output is very diminished due to frequent ectopic, non-perfusing beats. Hopefully as her ventricular ectopy improves her cardiac output and end organ function will also improve. Has a preserved EF, so suspect that there is restrictive/constrictive physiology given her prior malignancy which is driving up her filling pressures. Additionally, patient's CO/CI noted to improve once her frequent ectopies were minimized on Lidocaine gtt, suggesting some significant contribution of her NSVTs to her poor output state.     Date CVP PA PCWP MAP SVO2 CI CO SVR PVR Therapies   11/28/22 24 48/27/34 23  46 1.1 1.6   None   11/29/22 23 46/32/37 N/A  66 1.9 2.7   Lidocaine 1 mg/hr, IV Lasix   11/29/22 21 38/17/22 20 78 68 1.9 2.8 1628 0.7 Lidocaine 1 mg/hr, IV Lasix   11/30/22 16 43/22/29 19 67 75 2.6 3.8 1074 0.8 Lidocaine  1 mg/hr   12/1/22 15 37/21/26 19 79 68 2.1 3.0 1706 2.3 Intermittent vasopressin   12/2/22 15 40/21/27 16 84 71 2.4 3.41 1623 3.2 Post VT ablation, IV Lasix     - Volume status: mildly Hypervolemic, giving 60 mg IV Lasix x1 this morning  - Beta-blocker: start atenolol 25 mg bid on 12/2/22 (this is half her home dose)  - ACE/ARB/ARNI: Hold given CELSA  - SGLT2:  PTA Empagliflozin 10 mg daily (re started on 12/2)  - MRA: Hold given CELSA  - Anti-arrhythmic: none s/p VT ablation  - Electrophysiology consulted, appreciate recs, appreciate their assistance; s/p successful VT Ablation on 12/1/22.  - UPEP and SPEP negative  - Closely monitor SVO2, lactate, and electrolytes  - Restart PTA Eliquis on 12/2 evening    # Severe tricuspid regurgitation  # SSS s/p PPM (2019)  # Atrial Tachyarrhythmias  # Freq PVC  # NSVT  Cardiac history dates back to May 2017 with atrial tachyarrhythmias and EF of 40-45% at that time. Patient feels as if her pacemaker has been \"firing more\" than her baseline. Device check report showing 11,136 " "episodes of \"NSVT\" since 9/25/22 although EGM's reveal irregular v-v intervals with similar morphology to intrinsic rhythm indicating probable AF w/ RVR. [id number] = 2 (+ htn, + gender). Recurrent NSVT noted this admission,  pt symptomatic with dyspnea and diaphoresis. Was given IV Metoprolol and IVF on early morning of 11/28/22 with minimal effect . EP consulted and recommended discontinuing digoxin. Intolerant to sotalol and amiodarone.  - S/p successful VT ablation by EP on 12/1/22  - S/p Lidocaine gtt 11/28pm to 11/30 am now off   - Restarted PTA Eliquis on 12/2  - Atenolol as above    ===PULMONARY===  # No acute issues.   - On room air without complaints (intermittently on 2 LPM via nasal cannula for mild hypoxia)    ===GASTROINTESTINAL===  # No acute issues    # Nutrition:   - Tolerating PO, NPO on 12/1/22 for ablation    ===RENAL===  # Acute renal failure  Baseline creatinine (0.94 - 1.16). Creatinine of 1.19 on admission (11/19) peaked at 1.83 on 11/28/22 in the setting of cardiogenic shock secondary to low output heart failure.   - Address cardiogenic shock as above   - IV diuresis as above  - Trend at lease twice daily while in the ICU goal K > 4.5, Mg > 2.5 and Ca2+  - Avoid nephrotoxic medications     # Acute on subacute hyponatremia; improving  Baseline sodium was ~136 until 4/2022 when it started to slowly downtrend to high 120s - low 130s. Sodium on admission was 125 and with slow downtrend to 119 on 11/28/22. Sodium, slightly improved to 121 s/p acute ICU management (diuretics, lidocaine, improved cardiac output), further improvement to 127 s/p Tolvaptam and improved hemodynamics, at 133 on 12/2 s/p ablation  - Tolvaptam 15 mg x1 on 11/29/22  - Monitor sodium levels, drastic improvement s/p cardiogenic shock management and VT ablation, and at 131 on 12/2/22      ===HEME/ONC===  # History of Diffuse Large B-cell Lymphoma (DLBCL)  Diagnosed in 5/2017. S/p RCHOP and ECHOP therapy (tota 5 rounds). No " "history of radiation therapy. Deemed in complete remission in 2019 and graduated out of surveillance at that time.   - No acute issues at this time.   - Amyloid labs ordered given concern for restrictive/constrictive heart failure physiology, found to be negative     # Chronic thrombocytopenia  Baseline platelets 110s, was 106 on admission and currently at 89 on 22 --> 62 on 22  - Monitor    ===ENDOCRINE===  # No acute issues    ===INFECTIOUS DISEASE===  # No acute issues    # Antimicrobials:  N/A    ===SKIN/MSK===  # Rheumatoid arthritis  Stable  - Continue PTA Plaquenil 200 mg daily and Prednisone 3 mg daily  - Hold PTA Gabapentin in the setting of concern for increased somnolence in the setting of Lidocaine gtt.    ================================  Prophylaxis:  DVT: holding PTA   GI: N/A as patient is not intubated  Family: Updated  by bedside  Disposition: Critically Ill and in cardiogenic shock, anticipate at least 3-5 days in the ICU for continued management.   Code Status: Full (calrified with patient on 22, she is ok with advanced cardiac therapies if deemed necessary including ECMO)    ================================  Plan d/w Dr. Edward M.D., who is in agreement. Please, see staff addendum for changes/additions to the plan.    Marcos Garces MD, PhD  Cardiology Fellow  Pgr: [phone]  ===================================================================    SUBJECTIVE:   NAOE. Nursing and care team notes reviewed.  Overall in good spirits. Other details above.     OBJECTIVE:  BP (!) 76/52   Pulse 104   Temp 97.7  F (36.5  C)   Resp 16   Ht 1.448 m (4' 9\")   Wt 51.3 kg (113 lb 1.5 oz)   SpO2 96%   BMI 24.47 kg/m    Temp (24hrs), Av.4  F (36.3  C), Min:96.7  F (35.9  C), Max:98.1  F (36.7  C)      I/O:    Intake/Output Summary (Last 24 hours) at 2022 1406  Last data filed at 2022 1300  Gross per 24 hour   Intake 1109.37 ml   Output 1400 ml   Net -290.63 ml "       Wt:   Wt Readings from Last 5 Encounters:   12/02/22 51.3 kg (113 lb 1.5 oz)   11/03/22 55.8 kg (123 lb)   09/16/22 54.7 kg (120 lb 8 oz)   09/14/22 54.9 kg (121 lb)   08/26/22 55.5 kg (122 lb 6.4 oz)     GEN:pleasant, and interactive, NAD  HEENT: no icterus, eomi, mmm, [place] Coby Catheter in the RIJ  CV:  Tachycardic rate, irregular rhythm, normal s1/s2, diffuse 3/6 systolic murmur, CVP 15 this morning.   CHEST: poor respiratory effort, anterior exam, CTAB no rales or wheezing  ABD: soft, non-tender, normal active bowel sounds  EXTR: pulses +1. No clubbing, cyanosis or edema, arthritic distal changes  NEURO: alert oriented, speech fluent/appropriate, motor grossly nonfocal    Labs: reviewed in EMR  CBC  Recent Labs   Lab 12/02/22  0411 12/01/22  1725 12/01/22  0827 12/01/22  0351   WBC 6.7 7.2 7.2 7.7   RBC 3.95 4.05 3.93 3.80   HGB 14.4 14.5 14.2 13.7   HCT 41.5 42.2 40.8 39.3   * 104* 104* 103*   MCH 36.5* 35.8* 36.1* 36.1*   MCHC 34.7 34.4 34.8 34.9   RDW 17.3* 17.3* 17.2* 17.0*   PLT 62* 65* 74* 63*     BMP  Recent Labs   Lab 12/02/22  0411 12/01/22  1747 12/01/22  1725 12/01/22  0351 11/30/22  1926 11/30/22  1557 11/30/22  1550 11/29/22  0045 11/28/22 2058   *  --  133* 127*  --   --  129*   < > 119*   POTASSIUM 4.0  --  4.4 4.5 5.2  --  5.6*   < > 5.3   CHLORIDE 99  --  100 97*  --   --  96*   < > 86*   CO2 21*  --  18* 19*  --   --  21*   < > 15*   ANIONGAP 11  --  15 11  --   --  12   < > 18*   GLC 68* 92 82 100*  --    < > 172*   < > 183*  165*   BUN 26.4*  --  29.7* 36.2*  --   --  44.5*   < > 56.8*   CR 0.94  --  0.95 1.05  --   --  1.40*   < > 1.54*   GFRESTIMATED 68  --  67 60*  --   --  42*   < > 38*   VIKTORIYA 7.8*  --  7.8* 8.3*  --   --  8.7*   < > 9.3   MAG 2.0  --  2.1 2.4*  --   --  2.6*   < > 2.5*   PHOS 2.1*  --   --  2.0*  --   --   --   --  5.3*    < > = values in this interval not displayed.     Troponins:     Lab Results   Component Value Date    TROPI <0.015 12/12/2019     TROPI 0.043 09/10/2017    TROPI 0.042 09/10/2017    TROPI 0.042 09/10/2017    TROPI 0.024 06/24/2017    TROPONIN 0.19 (HH) 05/29/2017     INRNo lab results found in last 7 days.    EKG  11/28/22: Atrial Fibrillation with RVR and PVCs at a ventricular rate of 138.    Echocardiogram:  BRE 11/22/22:  Interpretation Summary  Global and regional left ventricular function is normal with an EF of 55-60%.  Mild to moderate right ventricular dilation is present. Right ventricular  function is normal.  Severe tricuspid insufficiency is present due to failure of the valve leaflets  (septal-anterior) to coapt. There is no obvious leaflet tethering due to the  pacemaker lead. There are no valvular vegetations present.  No pericardial effusion is present.    Coronary Angiogram (11/28/22):  - Images reviewed by me. Non obstructive single vessel CAD with a 30% stenosis lesion in proximal LAD    RHC (11/28/22):   RA 25 mmHg  RV 45/25 mmHg  PA 45/25 (30) mmHg  PCW 23 mmHg  Prabha CO 1.7 L/min   Prabha CI 1.2 L/min/m2   PA sat 54%   Hgb 17.2 g/dL   PVR 3.14 Woods units  SVR 1937 dynes-sec/cm5    Imaging: reviewed in EMR.    Cardiac MRI (4/12/18):   Clinical history: 56 yo female with remote h/o VSD repair, atrial tachy-arrhythmias, cardiac arrest,  lymphoma in remission, and pericarditis seen on recent MRI.   Comparison CMR: CMR from 03/2018   1. The LV is normal in cavity size and wall thickness. The global systolic function is normal. The LVEF is  54%. There are no regional wall motion abnormalities.   2. The RV is mildly dilated in cavity size. The global systolic function is normal. The RVEF is 49%.   3. Both atria are severely enlarged.  4. There is at least moderate, possibly severe tricuspid regurgitation.   5. Late gadolinium enhancement imaging shows hyperenhancement in the RV insertion site consistent with RV  pressure/volume overload.   6. There is a loculated pericardial effusion along the basal lateral and basal to mid  inferior LV walls,  and along the inferior RV wall. it appears exudative in nature, and is beginning to organize. There is  diffuse pericardial enhancement.  7. There is no intracardiac thrombus or mass.  8. There is a small right pleural effusion.  CONCLUSIONS:  Loculated exudative pericardial effusion that is beginning to organize, with diffuse  pericardial enhancement consistent with ongoing inflammation. Normal LV fxn, LVEF 54% and RVEF 49%. At  least moderate, possibly severe tricuspid regurgitation. Compared to MRI from 03/2018, effusion is largely  unchanged in size (maybe a bit smaller) but is starting to organize. No change in pericardial enhancement.

## 2022-12-02 NOTE — PROGRESS NOTES
[facility]    EP Consult Progress Note    Date of Admission:  11/19/2022  Requesting Physician: Rudy Maldonado MD    Assessment/Plan    Ms. Michel is a 61 year old female who has a past medical history significant for VSD s/p repair 1969, RA, HTN, insomnia, atrial tachyarrhythmias, cardiac arrest, SSS s/p PPM 12/2019, HFpEF, mod/severe TR, DLBCL, and exudative pericardial effusion. Now presenting with clinical right sided HF, severe TR, and hypervolemia. Noted to have fairly frequent PVCs and NSVT while in her permanent AF/AFL now s/p PVC ablation with Dr. Eaton.       EP recommendations:   Sick Sinus Syndrome:   Atrial Fibrillation  Frequent PVCs/NSVT:    1. Stroke Prophylaxis:  CHADSVASC=+HTN, +gender  2, corresponding to a 2.2% annual stroke / systemic emolism event rate. indicating need for long term oral anticoagulation.  She is on Eliquis. No bleeding issues.     2. Rate Control: Currently she has god blood pressure to restart home atenolol. Can start lower dose atenolol 25mg bid (home dose was 50mg bid). Goal < 110 resting heart rate. If difficulties with blood pressure can consider metoprolol vs digoxin (Discussed with patient she is open to metoprolol if her blood pressure is marginal)     3. Rhythm Control: Cardioversion, Antiarrhythmics and/or ablation are options for rhythm control. She has had several DCCV last in 4/12/19. Notably, she had possible side effects from Sotalol (however, likely was due to underlying illness at the time and not from medication since she was found to have DBCL).  She is currently in chronic AFL/AF but is asymptomatic and LVEF preserved.      4. Had tachy-shelton syndrome and underwent PPM implant in 12/2019. Normal device function with stable lead parameters.     5. PVCs/NSVT: She was found to have low cardiac output and was continuing to have frequent PVCs/NSVTs. She was given IV lidocaine bolus and gtt and ectopy was  suppressed. Reportedly CO/CI improved as well as diuresis. She had CNS/neuro side effects from lidocaine. We discussed we would favor ablation. She is now s/p PVC ablation 12/1/2022 with significant reduction in ectopy burden.       Thank you for allowing us to participate in the care of your patient. Please do not hesitate to contact us if you have any questions.     Selvin Fierro MD   Cardiology Fellow, PGY-5  December 2, 2022  1:04 PM     EP STAFF NOTE  Patient seen and examined and management plan personally reviewed with the patient. I agree with the note above by the CV/EP fellow.  Juan Eaton MD [facility]  Cardiology - Electrophysiology  Total time spent on patient visit, reviewing notes, imaging, labs, orders, and completing necessary documentation: 30 minutes.  >50% of visit spent on counseling patient and/or coordination of care.      ROS: Denies fevers, chills, cough, hemoptysis, abdominal pain, nausea, vomiting, diarrhea, constipation, melena, hematochezia, dysuria, numbness, or weakness.    Past Cardiac History  11/22/22 BRE:  Interpretation Summary  Global and regional left ventricular function is normal with an EF of 55-60%.  Mild to moderate right ventricular dilation is present. Right ventricular  function is normal.  Severe tricuspid insufficiency is present due to failure of the valve leaflets  (septal-anterior) to coapt. There is no obvious leaflet tethering due to the  pacemaker lead. There are no valvular vegetations present.  No pericardial effusion is present.     11/20/22 Echo:  Interpretation Summary  Global and regional left ventricular function is normal with an EF of 55-60%.  Borderline right ventricular enlargement.  Global right ventricular function is normal.  Severe tricuspid insufficiency is present.  IVC diameter >2.1 cm collapsing <50% with sniff suggests a high RA pressure  estimated at 15 mmHg or greater.  This study was compared with the study from 8/26/22: No  "significant changes  noted. Specifically, TR is severe on both studies.    7/24/17 CMRI:  1. The LV is normal in cavity size and wall thickness. The global systolic function is normal. The LVEF is  64%. There are no regional wall motion abnormalities. No evidence of myocardial iron overload.   2. The RV is normal in cavity size. The global systolic function is normal. The RVEF is 59%.   3. There is moderate dilation of bilateral atria.   4. Tricuspid regurgitation is present, difficult to quantify due to image quality.   5. Late gadolinium enhancement imaging shows RV insertion site hyperenhancement, a non-specific finding  seen in patients with RV volume/pressure overload.   6. There is a moderate right pleural effusion and small left pleural effusion.    CONCLUSIONS: Normal Bi-Ventricular systolic function, LVEF 64% and RVE 59%. There is no evidence of  infiltrative disease. Compared to the MRI from 5/15/2017, there is no significant change.        PMHx/PSHx/FHx/SHx: see below  Allergies:    Allergies   Allergen Reactions    Amiodarone Other (See Comments) and Difficulty breathing     Lethargic and had trouble breathing- occurred in 2017    Blood Transfusion Related (Informational Only) Hives     Hives with platelets. Give benadryl premedication.    Metoprolol Other (See Comments)     Pt and  report that metoprolol does not work for her and also reports feeling unwell with this medication. She has been able to tolerate atenolol, which as worked in controlling her HR.     Other [No Clinical Screening - See Comments]      Penicillin Allergy Skin Test not performed, see antimicrobial management team progress note 7/5/17.      Penicillins     Tape [Adhesive Tape] Rash     Home Medications : see below  Current Medications: see below    BP (!) 76/52   Pulse 111   Temp 97.6  F (36.4  C) (Oral)   Resp 26   Ht 1.448 m (4' 9\")   Wt 51.3 kg (113 lb 1.5 oz)   SpO2 98%   BMI 24.47 kg/m    Wt Readings from Last 2 " Encounters:   12/02/22 51.3 kg (113 lb 1.5 oz)   11/03/22 55.8 kg (123 lb)       Intake/Output Summary (Last 24 hours) at 12/2/2022 1304  Last data filed at 12/2/2022 1200  Gross per 24 hour   Intake 673 ml   Output 2550 ml   Net -1877 ml       Physical Exam  Gen: no acute distress  Neck: unable to assess JVP, SGC in place,  no carotid bruit appreciated  CV: nl s1, s2 3/6 systolic murmur, no rubs or gallops, PMI not displaced  Lungs: CTAB, no rales, no wheezing appreciated  Abd: + BS, soft, NT/ND  Ext: no LE edema, warm well perfused    Labs/Diagnostics: see below    Past Medical History  Past Medical History:   Diagnosis Date    Arthritis     Cardiac arrest (H)     History of blood clots 2017    PICC line Right arm     History of chemotherapy     History of transfusion     Hypertension     Neuropathy     Physical deconditioning     Positive PPD, 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    VSD (ventricular septal defect)        Past Surgical History  Past Surgical History:   Procedure Laterality Date    ANESTHESIA CARDIOVERSION N/A 5/17/2017    Procedure: ANESTHESIA CARDIOVERSION;  ANESTHESIA CARDIOVERSION;  Surgeon: GENERIC ANESTHESIA PROVIDER;  Location: [facility] OR    ANESTHESIA CARDIOVERSION  3/12/2018    Procedure: ANESTHESIA CARDIOVERSION;;  Surgeon: GENERIC ANESTHESIA PROVIDER;  Location: [facility] OR    ANESTHESIA CARDIOVERSION N/A 3/23/2018    Procedure: ANESTHESIA CARDIOVERSION;  Anesthesia Cardioversion ;  Surgeon: GENERIC ANESTHESIA PROVIDER;  Location: [facility] OR    ANESTHESIA CARDIOVERSION N/A 4/12/2018    Procedure: ANESTHESIA CARDIOVERSION;  Cardioversion;  Surgeon: GENERIC ANESTHESIA PROVIDER;  Location: [facility] OR    ARTHRODESIS WRIST  2000    Right wrist    BONE MARROW ASPIRATE &BIOPSY  7/12/2017         BONE MARROW BIOPSY W/ ASPIRATION  07/12/2017    CARDIOVERSION      5/17/17, 3/12/18, 3/23/18, 4/14/18,     COLONOSCOPY N/A 11/19/2019    Procedure: Colonoscopy, With Polypectomy And Biopsy;  Surgeon: Pj Vasques MD;  Location: [facility]  PACEMAKER N/A 12/13/2019    Procedure: EP Pacemaker;  Surgeon: Pj Trent MD;  Location:  [facility]ATH [facility]    EXCISE LESION UPPER EXTREMITY Left 5/22/2018    Procedure: EXCISE LESION UPPER EXTREMITY;  Left Arm Wound Excision And Closure, Possible Submuscular Transposition of Ulnar Nerve  (Choice Anesthesia);  Surgeon: Kevin Sheldon MD;  Location:  OR    FOOT SURGERY      4 left and 2 right    FOOT SURGERY      4 Left and 2 Right     IMPLANT PACEMAKER  12/13/2019    Dr China Trent  [facility] Cardiac Cath lab     IMPLANT PACEMAKER      RELEASE CARPAL TUNNEL Bilateral     RELEASE CARPAL TUNNEL BILATERAL      REPAIR VENTRICULAR SEPTAL DEFECT  1969    TRANSPOSITION ULNAR NERVE (ELBOW) Left 5/22/2018    Procedure: TRANSPOSITION ULNAR NERVE (ELBOW);;  Surgeon: Kevin Sheldon MD;  Location:  OR    ULNAR NERVE TRANSPOSITION Left 05/22/2018    VSD REPAIR  1969    ZZC FUSION FOOT BONES,SUBTALAR Right 10/20/2020    Procedure: RIGHT SUBTALAR JOINT FUSION AND CALCANEOCUBOID FUSION;  Surgeon: Nemesio Crow MD;  Location: [facility];  Service: Orthopedics       Social History  Social History     Socioeconomic History    Marital status:      Spouse name: Romeo Michel    Number of children: 0    Years of education: None    Highest education level: None   Occupational History     Employer: [employer]   Tobacco Use    Smoking status: Never    Smokeless tobacco: Never   Substance and Sexual Activity    Alcohol use: Not Currently     Comment: none    Drug use: No   Other Topics Concern    Parent/sibling w/ CABG, MI or angioplasty before 65F 55M? No     Social Determinants of Health     Intimate Partner Violence: Not At Risk    Fear of Current or Ex-Partner: No    Emotionally Abused: No    Physically Abused: No    Sexually Abused: No       Family History  Family History   Problem Relation Age of Onset    Breast Cancer Mother     Hypertension Mother     Alzheimer Disease  Mother     Cerebrovascular Disease Mother     Hypertension Father     Cerebrovascular Disease Father     Atrial fibrillation Father     Lymphoma Father     Prostate Cancer Father     Diabetes Paternal Grandmother     Alzheimer Disease Maternal Grandmother         likely    Arthritis Maternal Grandfather     Pneumonia Maternal Grandfather        Home Medications:  No current facility-administered medications on file prior to encounter.  acetaminophen (TYLENOL) 500 MG tablet, Take 500 mg by mouth every 6 hours as needed for mild pain  alendronate (FOSAMAX) 70 MG tablet, TAKE 1 TABLET(70 MG) BY MOUTH EVERY 7 DAYS  apixaban ANTICOAGULANT (ELIQUIS ANTICOAGULANT) 5 MG tablet, Take 1 tablet (5 mg) by mouth 2 times daily  atenolol (TENORMIN) 50 MG tablet, Take 1 tablet (50 mg) by mouth 2 times daily  calcium carbonate 600 mg-vitamin D 400 units (CALTRATE) 600-400 MG-UNIT per tablet, Take 2 tablets by mouth daily  digoxin (LANOXIN) 125 MCG tablet, Take 1 tablet (125 mcg) by mouth daily  furosemide (LASIX) 40 MG tablet, Take 0.5 tablets (20 mg) by mouth daily  gabapentin (NEURONTIN) 100 MG capsule, Take 1 capsule AM + 1 capsule afternoon + 2 capsules at bedtime  hydroxychloroquine (PLAQUENIL) 200 MG tablet, Take 1 tablet (200 mg) by mouth daily  loratadine (CLARITIN) 10 MG tablet, Take 10 mg by mouth daily  magnesium oxide 200 MG TABS, Take 200 mg by mouth daily bedtime  predniSONE (DELTASONE) 1 MG tablet, Take 3 tablets (3 mg) by mouth daily  spironolactone (ALDACTONE) 25 MG tablet, Take 2 tablets (50 mg) by mouth daily  multivitamin, therapeutic with minerals (THERA-VIT-M) TABS tablet, Take 1 tablet by mouth daily  STATIN NOT PRESCRIBED (INTENTIONAL), Please choose reason not prescribed, below (Patient not taking: Reported on 11/3/2022)        Current Medications   gabapentin  100 mg Oral TID    heparin ANTICOAGULANT  5,000 Units Subcutaneous Q12H    hydroxychloroquine  200 mg Oral Daily    loratadine  10 mg Oral Daily     potassium & sodium phosphates  1 packet Oral or Feeding Tube Q4H    predniSONE  3 mg Oral Daily    sodium chloride (PF)  3 mL Intracatheter Q8H       Diagnostics  CBC  Recent Labs   Lab 12/02/22 0411 12/01/22  1725 12/01/22  0827 12/01/22  0351   WBC 6.7 7.2 7.2 7.7   RBC 3.95 4.05 3.93 3.80   HGB 14.4 14.5 14.2 13.7   HCT 41.5 42.2 40.8 39.3   * 104* 104* 103*   MCH 36.5* 35.8* 36.1* 36.1*   MCHC 34.7 34.4 34.8 34.9   RDW 17.3* 17.3* 17.2* 17.0*   PLT 62* 65* 74* 63*     BMP  Recent Labs   Lab 12/02/22 0411 12/01/22  1747 12/01/22  1725 12/01/22  0351 11/30/22  1926 11/30/22  1557 11/30/22  1550 11/29/22  0045 11/28/22 2058   *  --  133* 127*  --   --  129*   < > 119*   POTASSIUM 4.0  --  4.4 4.5 5.2  --  5.6*   < > 5.3   CHLORIDE 99  --  100 97*  --   --  96*   < > 86*   CO2 21*  --  18* 19*  --   --  21*   < > 15*   ANIONGAP 11  --  15 11  --   --  12   < > 18*   GLC 68* 92 82 100*  --    < > 172*   < > 183*  165*   BUN 26.4*  --  29.7* 36.2*  --   --  44.5*   < > 56.8*   CR 0.94  --  0.95 1.05  --   --  1.40*   < > 1.54*   GFRESTIMATED 68  --  67 60*  --   --  42*   < > 38*   VIKTORIYA 7.8*  --  7.8* 8.3*  --   --  8.7*   < > 9.3   MAG 2.0  --  2.1 2.4*  --   --  2.6*   < > 2.5*   PHOS 2.1*  --  2.9 2.0*  --   --   --   --  5.3*    < > = values in this interval not displayed.      INRNo lab results found in last 7 days.  Liver panel  Recent Labs   Lab 12/02/22  0411 12/01/22  1725 12/01/22  0351 11/30/22  1550   PROTTOTAL 6.3* 6.5 5.7* 6.0*   ALBUMIN 3.4* 3.5 3.1* 3.4*   BILITOTAL 2.4* 2.4* 2.5* 2.4*   ALKPHOS 82 82 86 82   AST 47 48 37 39   ALT 27 27 27 33     Troponin:   Lab Results   Component Value Date    TROPI <0.015 12/12/2019    TROPI 0.043 09/10/2017    TROPI 0.042 09/10/2017    TROPI 0.042 09/10/2017    TROPI 0.024 06/24/2017    TROPONIN 0.19 (HH) 05/29/2017

## 2022-12-02 NOTE — PROGRESS NOTES
Brief Cardiology Progress Note     - Patient evaluated s/p successful VT ablation this afternoon without complaints. Evening labs relatively unremarkable with a stable hgb of 14.5, creatinine. Atrial Fibrillation on Telemetry evaluation.   - Evening Hemodynamics:  CVP 18, PA 41/25/30, PCWP 18, SVO2 69, MAP 83, CO/CI 2.9/2.0,SVR 1793, PVR 4.1    Plan:   Will give additional dose of 60 mg IV Lasix this evening.   Obtain EKG and monitor status      Marcos Garces MD, PhD  Cardiology Fellow  Pager: [phone]

## 2022-12-03 ENCOUNTER — APPOINTMENT (OUTPATIENT)
Dept: OCCUPATIONAL THERAPY | Facility: CLINIC | Age: 62
DRG: 273 | End: 2022-12-03
Attending: INTERNAL MEDICINE
Payer: COMMERCIAL

## 2022-12-03 ENCOUNTER — APPOINTMENT (OUTPATIENT)
Dept: GENERAL RADIOLOGY | Facility: CLINIC | Age: 62
DRG: 273 | End: 2022-12-03
Attending: INTERNAL MEDICINE
Payer: COMMERCIAL

## 2022-12-03 LAB
ALBUMIN SERPL BCG-MCNC: 3.5 G/DL (ref 3.5–5.2)
ALP SERPL-CCNC: 103 U/L (ref 35–129)
ALT SERPL W P-5'-P-CCNC: 30 U/L (ref 10–50)
ANION GAP SERPL CALCULATED.3IONS-SCNC: 12 MMOL/L (ref 7–15)
AST SERPL W P-5'-P-CCNC: 50 U/L (ref 10–50)
BASE EXCESS BLDV CALC-SCNC: 2.2 MMOL/L (ref -7.7–1.9)
BASE EXCESS BLDV CALC-SCNC: 2.8 MMOL/L (ref -7.7–1.9)
BASE EXCESS BLDV CALC-SCNC: 3.2 MMOL/L (ref -7.7–1.9)
BILIRUB SERPL-MCNC: 2.4 MG/DL
BUN SERPL-MCNC: 28.6 MG/DL (ref 8–23)
CALCIUM SERPL-MCNC: 8.5 MG/DL (ref 8.8–10.2)
CHLORIDE SERPL-SCNC: 96 MMOL/L (ref 98–107)
CREAT SERPL-MCNC: 0.94 MG/DL (ref 0.51–1.17)
DEPRECATED HCO3 PLAS-SCNC: 21 MMOL/L (ref 22–29)
ERYTHROCYTE [DISTWIDTH] IN BLOOD BY AUTOMATED COUNT: 17.2 % (ref 10–15)
ERYTHROCYTE [DISTWIDTH] IN BLOOD BY AUTOMATED COUNT: 17.2 % (ref 10–15)
GFR SERPL CREATININE-BSD FRML MDRD: 68 ML/MIN/1.73M2
GLUCOSE SERPL-MCNC: 82 MG/DL (ref 70–99)
HCO3 BLDV-SCNC: 27 MMOL/L (ref 21–28)
HCO3 BLDV-SCNC: 27 MMOL/L (ref 21–28)
HCO3 BLDV-SCNC: 28 MMOL/L (ref 21–28)
HCT VFR BLD AUTO: 42.6 % (ref 35–53)
HCT VFR BLD AUTO: 43.4 % (ref 35–53)
HGB BLD-MCNC: 14.3 G/DL (ref 11.7–17.7)
HGB BLD-MCNC: 14.9 G/DL (ref 11.7–17.7)
HOLD SPECIMEN: NORMAL
LACTATE SERPL-SCNC: 1.4 MMOL/L (ref 0.7–2)
LACTATE SERPL-SCNC: 1.5 MMOL/L (ref 0.7–2)
MAGNESIUM SERPL-MCNC: 2.1 MG/DL (ref 1.7–2.3)
MCH RBC QN AUTO: 35.4 PG (ref 26.5–33)
MCH RBC QN AUTO: 35.7 PG (ref 26.5–33)
MCHC RBC AUTO-ENTMCNC: 33.6 G/DL (ref 31.5–36.5)
MCHC RBC AUTO-ENTMCNC: 34.3 G/DL (ref 31.5–36.5)
MCV RBC AUTO: 104 FL (ref 78–100)
MCV RBC AUTO: 105 FL (ref 78–100)
O2/TOTAL GAS SETTING VFR VENT: 21 %
OXYHGB MFR BLDV: 57 % (ref 70–75)
OXYHGB MFR BLDV: 64 % (ref 70–75)
OXYHGB MFR BLDV: 69 % (ref 70–75)
PCO2 BLDV: 40 MM HG (ref 40–50)
PCO2 BLDV: 41 MM HG (ref 40–50)
PCO2 BLDV: 42 MM HG (ref 40–50)
PH BLDV: 7.43 [PH] (ref 7.32–7.43)
PHOSPHATE SERPL-MCNC: 2 MG/DL (ref 2.5–4.5)
PLATELET # BLD AUTO: 56 10E3/UL (ref 150–450)
PLATELET # BLD AUTO: 58 10E3/UL (ref 150–450)
PO2 BLDV: 32 MM HG (ref 25–47)
PO2 BLDV: 35 MM HG (ref 25–47)
PO2 BLDV: 38 MM HG (ref 25–47)
POTASSIUM SERPL-SCNC: 4 MMOL/L (ref 3.4–5.3)
PROT SERPL-MCNC: 6.2 G/DL (ref 6.4–8.3)
RBC # BLD AUTO: 4.04 10E6/UL (ref 3.8–5.9)
RBC # BLD AUTO: 4.17 10E6/UL (ref 3.8–5.9)
SODIUM SERPL-SCNC: 129 MMOL/L (ref 136–145)
WBC # BLD AUTO: 5.9 10E3/UL (ref 4–11)
WBC # BLD AUTO: 6 10E3/UL (ref 4–11)

## 2022-12-03 PROCEDURE — 999N000015 HC STATISTIC ARTERIAL MONITORING DAILY

## 2022-12-03 PROCEDURE — 250N000013 HC RX MED GY IP 250 OP 250 PS 637: Performed by: INTERNAL MEDICINE

## 2022-12-03 PROCEDURE — 71045 X-RAY EXAM CHEST 1 VIEW: CPT | Mod: 26 | Performed by: RADIOLOGY

## 2022-12-03 PROCEDURE — 200N000002 HC R&B ICU UMMC

## 2022-12-03 PROCEDURE — 250N000013 HC RX MED GY IP 250 OP 250 PS 637

## 2022-12-03 PROCEDURE — 999N000045 HC STATISTIC DAILY SWAN MONITORING

## 2022-12-03 PROCEDURE — 83605 ASSAY OF LACTIC ACID: CPT | Performed by: STUDENT IN AN ORGANIZED HEALTH CARE EDUCATION/TRAINING PROGRAM

## 2022-12-03 PROCEDURE — 82805 BLOOD GASES W/O2 SATURATION: CPT | Performed by: STUDENT IN AN ORGANIZED HEALTH CARE EDUCATION/TRAINING PROGRAM

## 2022-12-03 PROCEDURE — 80053 COMPREHEN METABOLIC PANEL: CPT | Performed by: STUDENT IN AN ORGANIZED HEALTH CARE EDUCATION/TRAINING PROGRAM

## 2022-12-03 PROCEDURE — 250N000013 HC RX MED GY IP 250 OP 250 PS 637: Performed by: STUDENT IN AN ORGANIZED HEALTH CARE EDUCATION/TRAINING PROGRAM

## 2022-12-03 PROCEDURE — 250N000012 HC RX MED GY IP 250 OP 636 PS 637

## 2022-12-03 PROCEDURE — 99291 CRITICAL CARE FIRST HOUR: CPT | Mod: GC | Performed by: INTERNAL MEDICINE

## 2022-12-03 PROCEDURE — 36415 COLL VENOUS BLD VENIPUNCTURE: CPT | Performed by: STUDENT IN AN ORGANIZED HEALTH CARE EDUCATION/TRAINING PROGRAM

## 2022-12-03 PROCEDURE — 83735 ASSAY OF MAGNESIUM: CPT | Performed by: STUDENT IN AN ORGANIZED HEALTH CARE EDUCATION/TRAINING PROGRAM

## 2022-12-03 PROCEDURE — 85027 COMPLETE CBC AUTOMATED: CPT | Performed by: STUDENT IN AN ORGANIZED HEALTH CARE EDUCATION/TRAINING PROGRAM

## 2022-12-03 PROCEDURE — 84100 ASSAY OF PHOSPHORUS: CPT | Performed by: INTERNAL MEDICINE

## 2022-12-03 PROCEDURE — 999N000147 HC STATISTIC PT IP EVAL DEFER

## 2022-12-03 PROCEDURE — 97530 THERAPEUTIC ACTIVITIES: CPT | Mod: GO

## 2022-12-03 PROCEDURE — 250N000011 HC RX IP 250 OP 636: Performed by: STUDENT IN AN ORGANIZED HEALTH CARE EDUCATION/TRAINING PROGRAM

## 2022-12-03 PROCEDURE — 71045 X-RAY EXAM CHEST 1 VIEW: CPT

## 2022-12-03 PROCEDURE — 999N000155 HC STATISTIC RAPCV CVP MONITORING

## 2022-12-03 RX ORDER — FUROSEMIDE 10 MG/ML
60 INJECTION INTRAMUSCULAR; INTRAVENOUS 2 TIMES DAILY
Status: COMPLETED | OUTPATIENT
Start: 2022-12-03 | End: 2022-12-03

## 2022-12-03 RX ORDER — GABAPENTIN 100 MG/1
100 CAPSULE ORAL 4 TIMES DAILY
Status: DISCONTINUED | OUTPATIENT
Start: 2022-12-03 | End: 2022-12-05 | Stop reason: HOSPADM

## 2022-12-03 RX ORDER — SPIRONOLACTONE 25 MG/1
50 TABLET ORAL DAILY
Status: DISCONTINUED | OUTPATIENT
Start: 2022-12-03 | End: 2022-12-05 | Stop reason: HOSPADM

## 2022-12-03 RX ADMIN — GABAPENTIN 100 MG: 100 CAPSULE ORAL at 16:16

## 2022-12-03 RX ADMIN — APIXABAN 5 MG: 5 TABLET, FILM COATED ORAL at 08:43

## 2022-12-03 RX ADMIN — ACETAMINOPHEN 650 MG: 325 TABLET, FILM COATED ORAL at 03:42

## 2022-12-03 RX ADMIN — APIXABAN 5 MG: 5 TABLET, FILM COATED ORAL at 20:11

## 2022-12-03 RX ADMIN — ATENOLOL 25 MG: 25 TABLET ORAL at 08:43

## 2022-12-03 RX ADMIN — ATENOLOL 25 MG: 25 TABLET ORAL at 20:11

## 2022-12-03 RX ADMIN — Medication 3 MG: at 08:42

## 2022-12-03 RX ADMIN — POTASSIUM & SODIUM PHOSPHATES POWDER PACK 280-160-250 MG 1 PACKET: 280-160-250 PACK at 08:42

## 2022-12-03 RX ADMIN — GABAPENTIN 100 MG: 100 CAPSULE ORAL at 08:43

## 2022-12-03 RX ADMIN — GABAPENTIN 100 MG: 100 CAPSULE ORAL at 22:09

## 2022-12-03 RX ADMIN — LORATADINE 10 MG: 10 TABLET ORAL at 08:42

## 2022-12-03 RX ADMIN — HYDROXYCHLOROQUINE SULFATE 200 MG: 200 TABLET, FILM COATED ORAL at 20:11

## 2022-12-03 RX ADMIN — SPIRONOLACTONE 50 MG: 25 TABLET, FILM COATED ORAL at 12:36

## 2022-12-03 RX ADMIN — FUROSEMIDE 60 MG: 10 INJECTION, SOLUTION INTRAVENOUS at 12:36

## 2022-12-03 RX ADMIN — FUROSEMIDE 60 MG: 10 INJECTION, SOLUTION INTRAVENOUS at 20:11

## 2022-12-03 RX ADMIN — ACETAMINOPHEN 650 MG: 325 TABLET, FILM COATED ORAL at 12:54

## 2022-12-03 RX ADMIN — ACETAMINOPHEN 650 MG: 325 TABLET, FILM COATED ORAL at 22:09

## 2022-12-03 RX ADMIN — EMPAGLIFLOZIN 10 MG: 10 TABLET, FILM COATED ORAL at 08:43

## 2022-12-03 ASSESSMENT — ACTIVITIES OF DAILY LIVING (ADL)
ADLS_ACUITY_SCORE: 29
ADLS_ACUITY_SCORE: 33
ADLS_ACUITY_SCORE: 29
ADLS_ACUITY_SCORE: 33
ADLS_ACUITY_SCORE: 29
ADLS_ACUITY_SCORE: 33
ADLS_ACUITY_SCORE: 29
ADLS_ACUITY_SCORE: 33
ADLS_ACUITY_SCORE: 33

## 2022-12-03 NOTE — PROGRESS NOTES
Cardiology ICU Progress Note  11/29/2022      ASSESSMENT/PLAN:  Amelia Michel is a 62 year old adult, w/ a complex PMHx most significant for HFpEF, mildly dilated and reduced RV function, Severe TR (2/2 failure of leaflet coaptation), atrial flutter/fibrillation (on apixaban and rate control strategy), cardiac arrest (2017 likely 2/2 malignant involvement of the pericardium c/b organizing pericardial effusion), SSS s/p ppm (2019), VSD (s/p repair 1969), SSS s/p PPM (12/2019), RA, and DLBCL (s/p CHOP therapy and in remission), admitted w/ decompensated heart failure with pictutre c/b frequent non sustained ventricular tachycardia with RHC on 11/28/22 notable for cardiogenic shock with a cardiac index of 1.2 and CO of 1.6. Etiology unclear but overall picture concerning for possible restrictive/constrictive physiology in the setting of her malignancy history (albeit presumably in remission) with CAD ruled out as patient had non obstructive disease on coronary angiogram done on 11/28/22. Patient was transferred to the cardiac ICU for acute management of low output cardiogenic shock. She is not s/p VT ablation on 12/1/22 with stabilized hemodynamics.     Interval history:   No acute events overnight. Tolerated initiation of atenolol 25 mg bid yesterday with heart rates in the 80s - 90s overnight. This morning she reports of feeling the best she had ney in weeks.     Plan for today:  - Remove swan  - Continue intermittent IV diuretics with Lasix 60 mg IV bid, restart PTA Apixaban  - Monitor platelets and if continue to downtrend will consult hematology for assistance  - Transfer to acute care floor    ===NEURO===  # Sedation  - Noted to be mildly more somnolent on 11/29/22 in the setting of Lidocaine gtt with Gabapentin stopped. Given fuzziness while on Lidocaine, drip was stopped with improvement in neurological status  - Symptoms now completely resolved and patient back to baseline.      ===CARDIOVASCULAR===  #Ambulatory cardiogenic shock  #Frequent, non-sustained ventricular tachycardia  Cardiac index consistent with ambulatory cardiogenic shock with evidence of poor end organ perfusion given her worsening renal function. Suspect her cardiac output is very diminished due to frequent ectopic, non-perfusing beats. Hopefully as her ventricular ectopy improves her cardiac output and end organ function will also improve. Has a preserved EF, so suspect that there is restrictive/constrictive physiology given her prior malignancy which is driving up her filling pressures. Additionally, patient's CO/CI noted to improve once her frequent ectopies were minimized on Lidocaine gtt, suggesting some significant contribution of her NSVTs to her poor output state.     Date CVP PA PCWP MAP SVO2 CI CO SVR PVR Therapies   11/28/22 24 48/27/34 23  46 1.1 1.6   None   11/29/22 23 46/32/37 N/A  66 1.9 2.7   Lidocaine 1 mg/hr, IV Lasix   11/29/22 21 38/17/22 20 78 68 1.9 2.8 1628 0.7 Lidocaine 1 mg/hr, IV Lasix   11/30/22 16 43/22/29 19 67 75 2.6 3.8 1074 0.8 Lidocaine  1 mg/hr   12/1/22 15 37/21/26 19 79 68 2.1 3.0 1706 2.3 Intermittent vasopressin   12/2/22 15 40/21/27 16 84 71 2.4 3.41 1623 3.2 Post VT ablation, IV Lasix   12/3/22 16 41/22/28 15 90 69 2.1 3.0 1973 4.3 Post VT ablation, IV Lasix     - Volume status: mildly Hypervolemic, giving 60 mg IV Lasix x2 today  - Beta-blocker: start atenolol 25 mg bid on 12/2/22 (this is half her home dose)  - ACE/ARB/ARNI: Hold given CELSA  - SGLT2:  PTA Empagliflozin 10 mg daily (re-started on 12/2)  - MRA: Hold given CELSA  - Anti-arrhythmic: none s/p VT ablation  - Electrophysiology consulted, appreciate recs, appreciate their assistance; s/p successful VT Ablation on 12/1/22.  - UPEP and SPEP negative  - Closely monitor SVO2, lactate, and electrolytes  - RestartED PTA Eliquis on 12/3 morning    # Severe tricuspid regurgitation  # SSS s/p PPM (2019)  # Atrial  "Tachyarrhythmias  # Freq PVC  # NSVT  Cardiac history dates back to May 2017 with atrial tachyarrhythmias and EF of 40-45% at that time. Patient feels as if her pacemaker has been \"firing more\" than her baseline. Device check report showing 11,136 episodes of \"NSVT\" since 9/25/22 although EGM's reveal irregular v-v intervals with similar morphology to intrinsic rhythm indicating probable AF w/ RVR. [id number] = 2 (+ htn, + gender). Recurrent NSVT noted this admission,  pt symptomatic with dyspnea and diaphoresis. Was given IV Metoprolol and IVF on early morning of 11/28/22 with minimal effect . EP consulted and recommended discontinuing digoxin. Intolerant to sotalol and amiodarone.  - S/p successful VT ablation by EP on 12/1/22  - S/p Lidocaine gtt 11/28pm to 11/30 am now off   - Restarted PTA Eliquis on 12/2  - Atenolol as above    ===PULMONARY===  # No acute issues.   - On room air without complaints (intermittently on 2 LPM via nasal cannula for mild hypoxia)    ===GASTROINTESTINAL===  # No acute issues    # Nutrition:   - Tolerating PO    ===RENAL===  # Acute renal failure  Baseline creatinine (0.94 - 1.16). Creatinine of 1.19 on admission (11/19) peaked at 1.83 on 11/28/22 in the setting of cardiogenic shock secondary to low output heart failure.   - Address cardiogenic shock as above   - IV diuresis as above  - Trend at lease twice daily while in the ICU goal K > 4.5, Mg > 2.5 and Ca2+  - Avoid nephrotoxic medications     # Acute on subacute hyponatremia; improving  Baseline sodium was ~136 until 4/2022 when it started to slowly downtrend to high 120s - low 130s. Sodium on admission was 125 and with slow downtrend to 119 on 11/28/22. Sodium, slightly improved to 121 s/p acute ICU management (diuretics, lidocaine, improved cardiac output), further improvement to 127 s/p Tolvaptam and improved hemodynamics, at 133 on 12/2 s/p ablation  - Tolvaptam 15 mg x1 on 11/29/22  - Monitor sodium levels, drastic " "improvement s/p cardiogenic shock management and VT ablation, and at 131 on 22 --> 129 on 12/3      ===HEME/ONC===  # History of Diffuse Large B-cell Lymphoma (DLBCL)  Diagnosed in 2017. S/p RCHOP and ECHOP therapy (total 5 rounds). No history of radiation therapy. Deemed in complete remission in 2019 and graduated out of surveillance at that time.   - No acute issues at this time.   - Amyloid labs ordered given concern for restrictive/constrictive heart failure physiology, found to be negative     # Chronic thrombocytopenia  Baseline platelets 110s, was 106 on admission and currently at 89 on 22 --> 62 on 22--> 58 on 12/3  - Monitor    ===ENDOCRINE===  # No acute issues    ===INFECTIOUS DISEASE===  # No acute issues    # Antimicrobials:  N/A    ===SKIN/MSK===  # Rheumatoid arthritis  Stable  - Continue PTA Plaquenil 200 mg daily and Prednisone 3 mg daily  - Restarted PTA Gabapentin 100 mg qid on 12/3    ================================  Prophylaxis:  DVT: holding PTA   GI: N/A as patient is not intubated  Family: Updated  by bedside  Disposition: Critically Ill and in cardiogenic shock, anticipate at least 3-5 days in the ICU for continued management.   Code Status: Full (calrified with patient on 22, she is ok with advanced cardiac therapies if deemed necessary including ECMO)    ================================  Plan d/w Dr. Edward M.D., who is in agreement. Please, see staff addendum for changes/additions to the plan.    Marcos Garces MD, PhD  Cardiology Fellow  Pgr: [phone]  ===================================================================    SUBJECTIVE:   NAOE. Nursing and care team notes reviewed.  Overall in good spirits. Other details above.     OBJECTIVE:  /85   Pulse 85   Temp 98  F (36.7  C) (Oral)   Resp 17   Ht 1.448 m (4' 9\")   Wt 51.4 kg (113 lb 5.1 oz)   SpO2 98%   BMI 24.52 kg/m    Temp (24hrs), Av.4  F (36.3  C), Min:96.7  F (35.9  C), Max:98.1 "  F (36.7  C)      I/O:    Intake/Output Summary (Last 24 hours) at 11/29/2022 1406  Last data filed at 11/29/2022 1300  Gross per 24 hour   Intake 1109.37 ml   Output 1400 ml   Net -290.63 ml       Wt:   Wt Readings from Last 5 Encounters:   12/03/22 51.4 kg (113 lb 5.1 oz)   11/03/22 55.8 kg (123 lb)   09/16/22 54.7 kg (120 lb 8 oz)   09/14/22 54.9 kg (121 lb)   08/26/22 55.5 kg (122 lb 6.4 oz)     GEN:pleasant, and interactive, NAD  HEENT: no icterus, eomi, mmm, [place] Coby Catheter in the RIJ  CV:  Tachycardic rate, irregular rhythm, normal s1/s2, diffuse 3/6 systolic murmur, CVP 15 this morning.   CHEST: poor respiratory effort, anterior exam, CTAB no rales or wheezing  ABD: soft, non-tender, normal active bowel sounds  EXTR: pulses +1. No clubbing, cyanosis or edema, arthritic distal changes  NEURO: alert oriented, speech fluent/appropriate, motor grossly nonfocal    Labs: reviewed in EMR  CBC  Recent Labs   Lab 12/03/22  0538 12/02/22  0411 12/01/22  1725 12/01/22  0827   WBC 5.9 6.7 7.2 7.2   RBC 4.04 3.95 4.05 3.93   HGB 14.3 14.4 14.5 14.2   HCT 42.6 41.5 42.2 40.8   * 105* 104* 104*   MCH 35.4* 36.5* 35.8* 36.1*   MCHC 33.6 34.7 34.4 34.8   RDW 17.2* 17.3* 17.3* 17.2*   PLT 58* 62* 65* 74*     BMP  Recent Labs   Lab 12/03/22  0341 12/02/22  1730 12/02/22  1318 12/02/22  0411 12/01/22  1747 12/01/22  1725 12/01/22  0351   * 128* 128* 131*  --  133* 127*   POTASSIUM 4.0 4.3 5.3 4.0  --  4.4 4.5   CHLORIDE 96* 95* 96* 99  --  100 97*   CO2 21* 21* 18* 21*  --  18* 19*   ANIONGAP 12 12 14 11  --  15 11   GLC 82 116* 137* 68*   < > 82 100*   BUN 28.6* 25.7* 27.7* 26.4*  --  29.7* 36.2*   CR 0.94 0.82 0.91 0.94  --  0.95 1.05   GFRESTIMATED 68 80 71 68  --  67 60*   VIKTORIYA 8.5* 8.0* 8.4* 7.8*  --  7.8* 8.3*   MAG 2.1  --  2.4* 2.0  --  2.1 2.4*   PHOS 2.0*  --   --  2.1*  --  2.9 2.0*    < > = values in this interval not displayed.     Troponins:     Lab Results   Component Value Date    TROPI <0.015  12/12/2019    TROPI 0.043 09/10/2017    TROPI 0.042 09/10/2017    TROPI 0.042 09/10/2017    TROPI 0.024 06/24/2017    TROPONIN 0.19 (HH) 05/29/2017     INRNo lab results found in last 7 days.    EKG  11/28/22: Atrial Fibrillation with RVR and PVCs at a ventricular rate of 138.    Echocardiogram:  BRE 11/22/22:  Interpretation Summary  Global and regional left ventricular function is normal with an EF of 55-60%.  Mild to moderate right ventricular dilation is present. Right ventricular  function is normal.  Severe tricuspid insufficiency is present due to failure of the valve leaflets  (septal-anterior) to coapt. There is no obvious leaflet tethering due to the  pacemaker lead. There are no valvular vegetations present.  No pericardial effusion is present.    Coronary Angiogram (11/28/22):  - Images reviewed by me. Non obstructive single vessel CAD with a 30% stenosis lesion in proximal LAD    RHC (11/28/22):   RA 25 mmHg  RV 45/25 mmHg  PA 45/25 (30) mmHg  PCW 23 mmHg  Prabha CO 1.7 L/min   Prabha CI 1.2 L/min/m2   PA sat 54%   Hgb 17.2 g/dL   PVR 3.14 Woods units  SVR 1937 dynes-sec/cm5    Imaging: reviewed in EMR.    Cardiac MRI (4/12/18):   Clinical history: 58 yo female with remote h/o VSD repair, atrial tachy-arrhythmias, cardiac arrest,  lymphoma in remission, and pericarditis seen on recent MRI.   Comparison CMR: CMR from 03/2018   1. The LV is normal in cavity size and wall thickness. The global systolic function is normal. The LVEF is  54%. There are no regional wall motion abnormalities.   2. The RV is mildly dilated in cavity size. The global systolic function is normal. The RVEF is 49%.   3. Both atria are severely enlarged.  4. There is at least moderate, possibly severe tricuspid regurgitation.   5. Late gadolinium enhancement imaging shows hyperenhancement in the RV insertion site consistent with RV  pressure/volume overload.   6. There is a loculated pericardial effusion along the basal lateral and basal  to mid inferior LV walls,  and along the inferior RV wall. it appears exudative in nature, and is beginning to organize. There is  diffuse pericardial enhancement.  7. There is no intracardiac thrombus or mass.  8. There is a small right pleural effusion.  CONCLUSIONS:  Loculated exudative pericardial effusion that is beginning to organize, with diffuse  pericardial enhancement consistent with ongoing inflammation. Normal LV fxn, LVEF 54% and RVEF 49%. At  least moderate, possibly severe tricuspid regurgitation. Compared to MRI from 03/2018, effusion is largely  unchanged in size (maybe a bit smaller) but is starting to organize. No change in pericardial enhancement.

## 2022-12-03 NOTE — PLAN OF CARE
Goal Outcome Evaluation:      Plan of Care Reviewed With: patient    Overall Patient Progress: improvingOverall Patient Progress: improving         Neuro/Pain/Sedation: A&Ox4, SAMPSON. Pleasant and cooperative. Endorses some BLE neuropathic pain, baseline for her.  Cardiac: Afib, rates  overnight. VVR  but not needing pacing. ANTONIO overnight with index of 1.9, sv 64.  Respiratory: Room air. Lungs clear bilaterally.  GI/: Low urine output overnight, team aware. 2L FR, Na+ restriction. No bowel movement tonight. C/o baseline urinary urgency.  Activity: Up to commode with assist of 1. Calls appropriately. Turning side to side independently overnight.  Skin: No new concerns. Scattered abrasions, bruises.  LDA's: [place] remains in place for hemodynamic monitoring. RR A line, R PIV.     Plan: Continue to optimize hemodynamics, volume status, and improve activity tolerance.

## 2022-12-03 NOTE — PROGRESS NOTES
Physical Therapy: Orders received. Chart reviewed and discussed with care team.? Physical Therapy not indicated due to pt moving well, close to baseline. OT addressing pt's inpatient skilled therapy needs.? Defer discharge recommendations to OT.? PT will complete orders.

## 2022-12-04 ENCOUNTER — APPOINTMENT (OUTPATIENT)
Dept: OCCUPATIONAL THERAPY | Facility: CLINIC | Age: 62
DRG: 273 | End: 2022-12-04
Attending: INTERNAL MEDICINE
Payer: COMMERCIAL

## 2022-12-04 LAB
ALBUMIN SERPL BCG-MCNC: 3.8 G/DL (ref 3.5–5.2)
ALBUMIN SERPL BCG-MCNC: 3.8 G/DL (ref 3.5–5.2)
ALP SERPL-CCNC: 116 U/L (ref 35–129)
ALP SERPL-CCNC: 121 U/L (ref 35–129)
ALT SERPL W P-5'-P-CCNC: 35 U/L (ref 10–50)
ALT SERPL W P-5'-P-CCNC: 37 U/L (ref 10–50)
ANION GAP SERPL CALCULATED.3IONS-SCNC: 13 MMOL/L (ref 7–15)
ANION GAP SERPL CALCULATED.3IONS-SCNC: 15 MMOL/L (ref 7–15)
AST SERPL W P-5'-P-CCNC: 56 U/L (ref 10–50)
AST SERPL W P-5'-P-CCNC: 59 U/L (ref 10–50)
BASOPHILS # BLD AUTO: 0 10E3/UL (ref 0–0.2)
BASOPHILS NFR BLD AUTO: 0 %
BILIRUB SERPL-MCNC: 2.4 MG/DL
BILIRUB SERPL-MCNC: 2.4 MG/DL
BUN SERPL-MCNC: 29.3 MG/DL (ref 8–23)
BUN SERPL-MCNC: 31.7 MG/DL (ref 8–23)
CALCIUM SERPL-MCNC: 8.8 MG/DL (ref 8.8–10.2)
CALCIUM SERPL-MCNC: 9 MG/DL (ref 8.8–10.2)
CHLORIDE SERPL-SCNC: 94 MMOL/L (ref 98–107)
CHLORIDE SERPL-SCNC: 95 MMOL/L (ref 98–107)
CREAT SERPL-MCNC: 1.06 MG/DL (ref 0.51–1.17)
CREAT SERPL-MCNC: 1.12 MG/DL (ref 0.51–1.17)
DEPRECATED HCO3 PLAS-SCNC: 21 MMOL/L (ref 22–29)
DEPRECATED HCO3 PLAS-SCNC: 24 MMOL/L (ref 22–29)
EOSINOPHIL # BLD AUTO: 0 10E3/UL (ref 0–0.7)
EOSINOPHIL NFR BLD AUTO: 0 %
ERYTHROCYTE [DISTWIDTH] IN BLOOD BY AUTOMATED COUNT: 16.8 % (ref 10–15)
ERYTHROCYTE [DISTWIDTH] IN BLOOD BY AUTOMATED COUNT: 17.1 % (ref 10–15)
GFR SERPL CREATININE-BSD FRML MDRD: 55 ML/MIN/1.73M2
GFR SERPL CREATININE-BSD FRML MDRD: 59 ML/MIN/1.73M2
GLUCOSE SERPL-MCNC: 119 MG/DL (ref 70–99)
GLUCOSE SERPL-MCNC: 71 MG/DL (ref 70–99)
HCT VFR BLD AUTO: 43.7 % (ref 35–53)
HCT VFR BLD AUTO: 45.6 % (ref 35–53)
HGB BLD-MCNC: 15.2 G/DL (ref 11.7–17.7)
HGB BLD-MCNC: 15.7 G/DL (ref 11.7–17.7)
IMM GRANULOCYTES # BLD: 0.1 10E3/UL
IMM GRANULOCYTES NFR BLD: 1 %
LACTATE SERPL-SCNC: 1.3 MMOL/L (ref 0.7–2)
LACTATE SERPL-SCNC: 1.4 MMOL/L (ref 0.7–2)
LYMPHOCYTES # BLD AUTO: 0.3 10E3/UL (ref 0.8–5.3)
LYMPHOCYTES NFR BLD AUTO: 5 %
MAGNESIUM SERPL-MCNC: 1.9 MG/DL (ref 1.7–2.3)
MAGNESIUM SERPL-MCNC: 2.4 MG/DL (ref 1.7–2.3)
MCH RBC QN AUTO: 36 PG (ref 26.5–33)
MCH RBC QN AUTO: 36.2 PG (ref 26.5–33)
MCHC RBC AUTO-ENTMCNC: 34.4 G/DL (ref 31.5–36.5)
MCHC RBC AUTO-ENTMCNC: 34.8 G/DL (ref 31.5–36.5)
MCV RBC AUTO: 104 FL (ref 78–100)
MCV RBC AUTO: 105 FL (ref 78–100)
MONOCYTES # BLD AUTO: 0.5 10E3/UL (ref 0–1.3)
MONOCYTES NFR BLD AUTO: 8 %
NEUTROPHILS # BLD AUTO: 5.3 10E3/UL (ref 1.6–8.3)
NEUTROPHILS NFR BLD AUTO: 86 %
NRBC # BLD AUTO: 0 10E3/UL
NRBC BLD AUTO-RTO: 0 /100
PHOSPHATE SERPL-MCNC: 2.6 MG/DL (ref 2.5–4.5)
PLATELET # BLD AUTO: 56 10E3/UL (ref 150–450)
PLATELET # BLD AUTO: 64 10E3/UL (ref 150–450)
POTASSIUM SERPL-SCNC: 3.8 MMOL/L (ref 3.4–5.3)
POTASSIUM SERPL-SCNC: 4.2 MMOL/L (ref 3.4–5.3)
PROT SERPL-MCNC: 6.8 G/DL (ref 6.4–8.3)
PROT SERPL-MCNC: 7.2 G/DL (ref 6.4–8.3)
RBC # BLD AUTO: 4.2 10E6/UL (ref 3.8–5.9)
RBC # BLD AUTO: 4.36 10E6/UL (ref 3.8–5.9)
RETICS # AUTO: 0.12 10E6/UL (ref 0.03–0.1)
RETICS/RBC NFR AUTO: 2.9 % (ref 0.5–2)
SODIUM SERPL-SCNC: 130 MMOL/L (ref 136–145)
SODIUM SERPL-SCNC: 132 MMOL/L (ref 136–145)
WBC # BLD AUTO: 6.2 10E3/UL (ref 4–11)
WBC # BLD AUTO: 6.3 10E3/UL (ref 4–11)

## 2022-12-04 PROCEDURE — 83735 ASSAY OF MAGNESIUM: CPT | Performed by: STUDENT IN AN ORGANIZED HEALTH CARE EDUCATION/TRAINING PROGRAM

## 2022-12-04 PROCEDURE — 97530 THERAPEUTIC ACTIVITIES: CPT | Mod: GO

## 2022-12-04 PROCEDURE — 250N000011 HC RX IP 250 OP 636: Performed by: INTERNAL MEDICINE

## 2022-12-04 PROCEDURE — 250N000012 HC RX MED GY IP 250 OP 636 PS 637

## 2022-12-04 PROCEDURE — 250N000013 HC RX MED GY IP 250 OP 250 PS 637: Performed by: STUDENT IN AN ORGANIZED HEALTH CARE EDUCATION/TRAINING PROGRAM

## 2022-12-04 PROCEDURE — 250N000013 HC RX MED GY IP 250 OP 250 PS 637: Performed by: INTERNAL MEDICINE

## 2022-12-04 PROCEDURE — 85027 COMPLETE CBC AUTOMATED: CPT | Performed by: STUDENT IN AN ORGANIZED HEALTH CARE EDUCATION/TRAINING PROGRAM

## 2022-12-04 PROCEDURE — 85045 AUTOMATED RETICULOCYTE COUNT: CPT | Performed by: STUDENT IN AN ORGANIZED HEALTH CARE EDUCATION/TRAINING PROGRAM

## 2022-12-04 PROCEDURE — 99291 CRITICAL CARE FIRST HOUR: CPT | Mod: GC | Performed by: INTERNAL MEDICINE

## 2022-12-04 PROCEDURE — 84100 ASSAY OF PHOSPHORUS: CPT | Performed by: INTERNAL MEDICINE

## 2022-12-04 PROCEDURE — 83605 ASSAY OF LACTIC ACID: CPT | Performed by: STUDENT IN AN ORGANIZED HEALTH CARE EDUCATION/TRAINING PROGRAM

## 2022-12-04 PROCEDURE — 250N000013 HC RX MED GY IP 250 OP 250 PS 637

## 2022-12-04 PROCEDURE — 84155 ASSAY OF PROTEIN SERUM: CPT | Performed by: STUDENT IN AN ORGANIZED HEALTH CARE EDUCATION/TRAINING PROGRAM

## 2022-12-04 PROCEDURE — 84450 TRANSFERASE (AST) (SGOT): CPT | Performed by: STUDENT IN AN ORGANIZED HEALTH CARE EDUCATION/TRAINING PROGRAM

## 2022-12-04 PROCEDURE — 80053 COMPREHEN METABOLIC PANEL: CPT | Performed by: STUDENT IN AN ORGANIZED HEALTH CARE EDUCATION/TRAINING PROGRAM

## 2022-12-04 PROCEDURE — 36415 COLL VENOUS BLD VENIPUNCTURE: CPT | Performed by: STUDENT IN AN ORGANIZED HEALTH CARE EDUCATION/TRAINING PROGRAM

## 2022-12-04 PROCEDURE — 200N000002 HC R&B ICU UMMC

## 2022-12-04 PROCEDURE — 97535 SELF CARE MNGMENT TRAINING: CPT | Mod: GO

## 2022-12-04 RX ORDER — MAGNESIUM SULFATE HEPTAHYDRATE 40 MG/ML
2 INJECTION, SOLUTION INTRAVENOUS ONCE
Status: COMPLETED | OUTPATIENT
Start: 2022-12-04 | End: 2022-12-04

## 2022-12-04 RX ORDER — POTASSIUM CHLORIDE 750 MG/1
10 TABLET, EXTENDED RELEASE ORAL ONCE
Status: COMPLETED | OUTPATIENT
Start: 2022-12-04 | End: 2022-12-04

## 2022-12-04 RX ORDER — ATENOLOL 50 MG/1
50 TABLET ORAL 2 TIMES DAILY
Status: DISCONTINUED | OUTPATIENT
Start: 2022-12-04 | End: 2022-12-05 | Stop reason: HOSPADM

## 2022-12-04 RX ADMIN — MAGNESIUM SULFATE IN WATER 2 G: 40 INJECTION, SOLUTION INTRAVENOUS at 06:04

## 2022-12-04 RX ADMIN — EMPAGLIFLOZIN 10 MG: 10 TABLET, FILM COATED ORAL at 08:40

## 2022-12-04 RX ADMIN — LORATADINE 10 MG: 10 TABLET ORAL at 08:39

## 2022-12-04 RX ADMIN — SPIRONOLACTONE 50 MG: 25 TABLET, FILM COATED ORAL at 08:39

## 2022-12-04 RX ADMIN — GABAPENTIN 100 MG: 100 CAPSULE ORAL at 16:01

## 2022-12-04 RX ADMIN — GABAPENTIN 100 MG: 100 CAPSULE ORAL at 08:40

## 2022-12-04 RX ADMIN — ATENOLOL 50 MG: 25 TABLET ORAL at 19:25

## 2022-12-04 RX ADMIN — APIXABAN 5 MG: 5 TABLET, FILM COATED ORAL at 19:25

## 2022-12-04 RX ADMIN — POTASSIUM CHLORIDE 10 MEQ: 750 TABLET, EXTENDED RELEASE ORAL at 08:40

## 2022-12-04 RX ADMIN — Medication 200 MG: at 21:45

## 2022-12-04 RX ADMIN — HYDROXYCHLOROQUINE SULFATE 200 MG: 200 TABLET, FILM COATED ORAL at 19:25

## 2022-12-04 RX ADMIN — FUROSEMIDE 60 MG: 40 TABLET ORAL at 10:34

## 2022-12-04 RX ADMIN — FUROSEMIDE 60 MG: 40 TABLET ORAL at 16:01

## 2022-12-04 RX ADMIN — GABAPENTIN 100 MG: 100 CAPSULE ORAL at 21:45

## 2022-12-04 RX ADMIN — ATENOLOL 25 MG: 25 TABLET ORAL at 08:40

## 2022-12-04 RX ADMIN — APIXABAN 5 MG: 5 TABLET, FILM COATED ORAL at 08:40

## 2022-12-04 RX ADMIN — Medication 3 MG: at 08:40

## 2022-12-04 RX ADMIN — GABAPENTIN 100 MG: 100 CAPSULE ORAL at 12:17

## 2022-12-04 ASSESSMENT — ACTIVITIES OF DAILY LIVING (ADL)
ADLS_ACUITY_SCORE: 29

## 2022-12-04 NOTE — PLAN OF CARE
Goal Outcome Evaluation:      Plan of Care Reviewed With: patient    Overall Patient Progress: improvingOverall Patient Progress: improving     Neuro/Pain/Sedation: A&Ox4, calm, cooperative. Follows commands and moves all extremities spontaneously.  Cardiac: AFib, rate 80-90 overnight. Occasional V pacing triggered. Stable MAPs.  Respiratory: Oxygenating well on room air.  GI/: Purewick in place overnight. Robust response to evening dose of Lasix. No bowel movement. 2L FR, compliant with no issues  Activity: Independently shifting weight. Up to commode with min assist.  Skin: No new concerns. Monitoring former SGC site in context of low platelets, pressure dressing currently in place.  LDA's: 2x PIV.     Plan: Awaiting bed availability for floor transfer. Continue to optimise volume status and improve activity tolerance.

## 2022-12-04 NOTE — PROGRESS NOTES
ICU End of Shift Summary. See flowsheets for vital signs and detailed assessment.    Major events this shift: Neuro intact. SBA. Afebrile. VSS on RA. Afib, rate 70-90s; rare PVCs, rare pacing. Pt expresses concern for current Afib and low platelet count due to similar presentation to prior lymphoma diagnosis; anxious about recurrence. Platelets increased today. No BM this shift, adequate UOP. Switched to PO Lasix. Ambulated around unit x2, once with OT, once with RN, tolerated well.      Plan: ECHO tomorrow for RV and TR evaluation. Outpatient hematology f/u within 1 week of discharge. Discharge home from ICU tomorrow. Update Cards 2 with changes.

## 2022-12-04 NOTE — PROGRESS NOTES
Cardiology ICU Progress Note  11/29/2022      ASSESSMENT/PLAN:  Amelia Michel is a 62 year old adult, w/ a complex PMHx most significant for HFpEF, mildly dilated and reduced RV function, Severe TR (2/2 failure of leaflet coaptation), atrial flutter/fibrillation (on apixaban and rate control strategy), cardiac arrest (2017 likely 2/2 malignant involvement of the pericardium c/b organizing pericardial effusion), SSS s/p ppm (2019), VSD (s/p repair 1969), SSS s/p PPM (12/2019), RA, and DLBCL (s/p CHOP therapy and in remission), admitted w/ decompensated heart failure with pictutre c/b frequent non sustained ventricular tachycardia with RHC on 11/28/22 notable for cardiogenic shock with a cardiac index of 1.2 and CO of 1.6. Etiology unclear but overall picture concerning for possible restrictive/constrictive physiology in the setting of her malignancy history (albeit presumably in remission) with CAD ruled out as patient had non obstructive disease on coronary angiogram done on 11/28/22. Patient was transferred to the cardiac ICU for acute management of low output cardiogenic shock. She is not s/p VT ablation on 12/1/22 with stabilized hemodynamics.     Interval history:   No acute events overnight. Reports that she continues to feel great and looking forward to working with PT/OT today. Weight today at 109 lbs will be considered new dry weight.     Plan for today:  - Transition to oral diuretics (Lasix 60 mg bid)  - Increase atenolol to 50 mg bid  - Platelets stable today at 56 --> 68, peripheral smear sent, will plan for outpatient heme onc follow up.   - Plan for discharge tomorrow after an TTE in the AM.     ===NEURO===  # Sedation  - Noted to be mildly more somnolent on 11/29/22 in the setting of Lidocaine gtt with Gabapentin stopped. Given fuzziness while on Lidocaine, drip was stopped with improvement in neurological status  - Symptoms now completely resolved and patient back to baseline.      ===CARDIOVASCULAR===  #Ambulatory cardiogenic shock  #Frequent, non-sustained ventricular tachycardia  Cardiac index consistent with ambulatory cardiogenic shock with evidence of poor end organ perfusion given her worsening renal function. Suspect her cardiac output is very diminished due to frequent ectopic, non-perfusing beats. Hopefully as her ventricular ectopy improves her cardiac output and end organ function will also improve. Has a preserved EF, so suspect that there is restrictive/constrictive physiology given her prior malignancy which is driving up her filling pressures. Additionally, patient's CO/CI noted to improve once her frequent ectopies were minimized on Lidocaine gtt, suggesting some significant contribution of her NSVTs to her poor output state.     Date CVP PA PCWP MAP SVO2 CI CO SVR PVR Therapies   11/28/22 24 48/27/34 23  46 1.1 1.6   None   11/29/22 23 46/32/37 N/A  66 1.9 2.7   Lidocaine 1 mg/hr, IV Lasix   11/29/22 21 38/17/22 20 78 68 1.9 2.8 1628 0.7 Lidocaine 1 mg/hr, IV Lasix   11/30/22 16 43/22/29 19 67 75 2.6 3.8 1074 0.8 Lidocaine  1 mg/hr   12/1/22 15 37/21/26 19 79 68 2.1 3.0 1706 2.3 Intermittent vasopressin   12/2/22 15 40/21/27 16 84 71 2.4 3.4 1623 3.2 Post VT ablation, IV Lasix   12/3/22 16 41/22/28 15 90 69 2.1 3.0 1973 4.3 Post VT ablation, IV Lasix     - Volume status: euvolemic, start oral lasix at 60 mg bid and monitor electrolytes  - Beta-blocker: start atenolol 25 mg bid on 12/2/22 --> Increased to PTA dose of 50 mg bid on 12/5 in the setting of intermittent PVCs.   - ACE/ARB/ARNI: Hold given CELSA (not on any prior to this admission)  - SGLT2:  PTA Empagliflozin 10 mg daily (re-started on 12/2)  - MRA: PTA Spironolactone 50 mg daily, restarted on 12/4   - Anti-arrhythmic: none s/p VT ablation  - Electrophysiology consulted, appreciate recs, appreciate their assistance; s/p successful VT Ablation on 12/1/22.  - UPEP and SPEP negative  - Closely monitor SVO2, lactate,  "and electrolytes  - RestartED PTA Eliquis on 12/3 morning  - TTE on 12/5 to evaluate RV and TR    # Severe tricuspid regurgitation  # SSS s/p PPM (2019)  # Atrial Tachyarrhythmias  # Freq PVC  # NSVT  Cardiac history dates back to May 2017 with atrial tachyarrhythmias and EF of 40-45% at that time. Patient feels as if her pacemaker has been \"firing more\" than her baseline. Device check report showing 11,136 episodes of \"NSVT\" since 9/25/22 although EGM's reveal irregular v-v intervals with similar morphology to intrinsic rhythm indicating probable AF w/ RVR. [id number] = 2 (+ htn, + gender). Recurrent NSVT noted this admission,  pt symptomatic with dyspnea and diaphoresis. Was given IV Metoprolol and IVF on early morning of 11/28/22 with minimal effect . EP consulted and recommended discontinuing digoxin. Intolerant to sotalol and amiodarone.  - S/p successful VT ablation by EP on 12/1/22  - S/p Lidocaine gtt 11/28pm to 11/30 am now off   - Restarted PTA Eliquis on 12/2,  5 mg bid  - Atenolol as above, 50 mg bid    ===PULMONARY===  # No acute issues.   - On room air without complaints (intermittently on 2 LPM via nasal cannula for mild hypoxia)    ===GASTROINTESTINAL===  # No acute issues    # Nutrition:   - Tolerating PO    ===RENAL===  # Acute renal failure  Baseline creatinine (0.94 - 1.16). Creatinine of 1.19 on admission (11/19) peaked at 1.83 on 11/28/22 in the setting of cardiogenic shock secondary to low output heart failure.   - Address cardiogenic shock as above   - IV diuresis as above  - Trend at lease twice daily while in the ICU goal K > 4.5, Mg > 2.5 and Ca2+  - Avoid nephrotoxic medications     # Acute on subacute hyponatremia; improved  Baseline sodium was ~136 until 4/2022 when it started to slowly downtrend to high 120s - low 130s. Sodium on admission was 125 and with slow downtrend to 119 on 11/28/22. Sodium, slightly improved to 121 s/p acute ICU management (diuretics, lidocaine, improved " cardiac output), further improvement to 127 s/p Tolvaptam and improved hemodynamics, at 133 on 12/2 s/p ablation  - Tolvaptam 15 mg x1 on 11/29/22  - Monitor sodium levels, drastic improvement s/p cardiogenic shock management and VT ablation, and at 131 on 12/2/22 --> 129 on 12/3    ===HEME/ONC===  # History of Diffuse Large B-cell Lymphoma (DLBCL)  Diagnosed in 5/2017. S/p RCHOP and ECHOP therapy (total 5 rounds). No history of radiation therapy. Deemed in complete remission in 2019 and graduated out of surveillance at that time.   - No acute issues at this time.   - Amyloid labs ordered given concern for restrictive/constrictive heart failure physiology, found to be negative     # Acute on chronic thrombocytopenia  Baseline platelets 110s, was 106 on admission and currently at 89 on 11/30/22 --> 62 on 12/2/22--> 58 on 12/3 --> 56-->68 on 12/4  - Peripheral smear sent on 12/4  -  Monitor    ===ENDOCRINE===  # No acute issues    ===INFECTIOUS DISEASE===  # No acute issues    # Antimicrobials:  N/A    ===SKIN/MSK===  # Rheumatoid arthritis  Stable  - Continue PTA Plaquenil 200 mg daily and Prednisone 3 mg daily  - Restarted PTA Gabapentin 100 mg qid on 12/3    ================================  Prophylaxis:  DVT: holding PTA   GI: N/A as patient is not intubated  Family: Updated  by bedside  Disposition: Critically Ill and in cardiogenic shock, anticipate at least 3-5 days in the ICU for continued management.   Code Status: Full (calrified with patient on 11/28/22, she is ok with advanced cardiac therapies if deemed necessary including ECMO)    ================================  Plan d/w Dr. Edward M.D., who is in agreement. Please, see staff addendum for changes/additions to the plan.    Marcos Garces MD, PhD  Cardiology Fellow  Pgr: [phone]  =================================    SUBJECTIVE:   NAOE. Nursing and care team notes reviewed.  Overall in good spirits. Other details above.     OBJECTIVE:  BP 97/68  "(BP Location: Right arm, Cuff Size: Adult Regular)   Pulse 83   Temp 97.2  F (36.2  C) (Oral)   Resp (!) 70   Ht 1.448 m (4' 9\")   Wt 49.5 kg (109 lb 2 oz)   SpO2 97%   BMI 23.61 kg/m    Temp (24hrs), Av.4  F (36.3  C), Min:96.7  F (35.9  C), Max:98.1  F (36.7  C)      I/O:    Intake/Output Summary (Last 24 hours) at 2022 1406  Last data filed at 2022 1300  Gross per 24 hour   Intake 1109.37 ml   Output 1400 ml   Net -290.63 ml       Wt:   Wt Readings from Last 5 Encounters:   22 49.5 kg (109 lb 2 oz)   22 55.8 kg (123 lb)   22 54.7 kg (120 lb 8 oz)   22 54.9 kg (121 lb)   22 55.5 kg (122 lb 6.4 oz)     GEN:pleasant, and interactive, NAD  HEENT: no icterus, eomi, mmm, Prim Coby Catheter in the RIJ  CV:  Tachycardic rate, irregular rhythm, normal s1/s2, diffuse 3/6 systolic murmur, CVP 15 this morning.   CHEST: poor respiratory effort, anterior exam, CTAB no rales or wheezing  ABD: soft, non-tender, normal active bowel sounds  EXTR: pulses +1. No clubbing, cyanosis or edema, arthritic distal changes  NEURO: alert oriented, speech fluent/appropriate, motor grossly nonfocal    Labs: reviewed in EMR  CBC  Recent Labs   Lab 22  0333 22  1224 22  0538 22  0411   WBC 6.3 6.0 5.9 6.7   RBC 4.36 4.17 4.04 3.95   HGB 15.7 14.9 14.3 14.4   HCT 45.6 43.4 42.6 41.5   * 104* 105* 105*   MCH 36.0* 35.7* 35.4* 36.5*   MCHC 34.4 34.3 33.6 34.7   RDW 17.1* 17.2* 17.2* 17.3*   PLT 56* 56* 58* 62*     BMP  Recent Labs   Lab 22  0333 22  0341 22  1730 22  1318 22  0411 22  1747 22  1725   * 129* 128* 128* 131*  --  133*   POTASSIUM 3.8 4.0 4.3 5.3 4.0  --  4.4   CHLORIDE 95* 96* 95* 96* 99  --  100   CO2 24 21* 21* 18* 21*  --  18*   ANIONGAP 13 12 12 14 11  --  15   GLC 71 82 116* 137* 68*   < > 82   BUN 29.3* 28.6* 25.7* 27.7* 26.4*  --  29.7*   CR 1.12 0.94 0.82 0.91 0.94  --  0.95   GFRESTIMATED 55* 68 " 80 71 68  --  67   VIKTORIYA 9.0 8.5* 8.0* 8.4* 7.8*  --  7.8*   MAG 1.9 2.1  --  2.4* 2.0  --  2.1   PHOS 2.6 2.0*  --   --  2.1*  --  2.9    < > = values in this interval not displayed.     Troponins:     Lab Results   Component Value Date    TROPI <0.015 12/12/2019    TROPI 0.043 09/10/2017    TROPI 0.042 09/10/2017    TROPI 0.042 09/10/2017    TROPI 0.024 06/24/2017    TROPONIN 0.19 (HH) 05/29/2017     INRNo lab results found in last 7 days.    EKG  11/28/22: Atrial Fibrillation with RVR and PVCs at a ventricular rate of 138.    Echocardiogram:  BRE 11/22/22:  Interpretation Summary  Global and regional left ventricular function is normal with an EF of 55-60%.  Mild to moderate right ventricular dilation is present. Right ventricular  function is normal.  Severe tricuspid insufficiency is present due to failure of the valve leaflets  (septal-anterior) to coapt. There is no obvious leaflet tethering due to the  pacemaker lead. There are no valvular vegetations present.  No pericardial effusion is present.    Coronary Angiogram (11/28/22):  - Images reviewed by me. Non obstructive single vessel CAD with a 30% stenosis lesion in proximal LAD    RHC (11/28/22):   RA 25 mmHg  RV 45/25 mmHg  PA 45/25 (30) mmHg  PCW 23 mmHg  Prabha CO 1.7 L/min   Prabha CI 1.2 L/min/m2   PA sat 54%   Hgb 17.2 g/dL   PVR 3.14 Woods units  SVR 1937 dynes-sec/cm5    Imaging: reviewed in EMR.    Cardiac MRI (4/12/18):   Clinical history: 56 yo female with remote h/o VSD repair, atrial tachy-arrhythmias, cardiac arrest,  lymphoma in remission, and pericarditis seen on recent MRI.   Comparison CMR: CMR from 03/2018   1. The LV is normal in cavity size and wall thickness. The global systolic function is normal. The LVEF is  54%. There are no regional wall motion abnormalities.   2. The RV is mildly dilated in cavity size. The global systolic function is normal. The RVEF is 49%.   3. Both atria are severely enlarged.  4. There is at least moderate,  possibly severe tricuspid regurgitation.   5. Late gadolinium enhancement imaging shows hyperenhancement in the RV insertion site consistent with RV  pressure/volume overload.   6. There is a loculated pericardial effusion along the basal lateral and basal to mid inferior LV walls,  and along the inferior RV wall. it appears exudative in nature, and is beginning to organize. There is  diffuse pericardial enhancement.  7. There is no intracardiac thrombus or mass.  8. There is a small right pleural effusion.  CONCLUSIONS:  Loculated exudative pericardial effusion that is beginning to organize, with diffuse  pericardial enhancement consistent with ongoing inflammation. Normal LV fxn, LVEF 54% and RVEF 49%. At  least moderate, possibly severe tricuspid regurgitation. Compared to MRI from 03/2018, effusion is largely  unchanged in size (maybe a bit smaller) but is starting to organize. No change in pericardial enhancement.

## 2022-12-04 NOTE — PLAN OF CARE
Major Shift Events: Neuro intact. Afebrile. Afib CVR 80s-90s, VVR backup permanent pacer with rare pacing. SBP goal maintained w/o interventions. Art line and SWAN removed. VSS on RA. Tolerating 2g Na/2L fluid diet. Multiple soft stools. Adequate UOP, Lasix 60 given. Up to chair a few times today, walked in hallway with RN. 6C orders given.    Plan: Transfer to floor when bed available. Update primary team with changes in POC.    For vital signs and complete assessments, please see flowsheets.

## 2022-12-05 ENCOUNTER — APPOINTMENT (OUTPATIENT)
Dept: OCCUPATIONAL THERAPY | Facility: CLINIC | Age: 62
DRG: 273 | End: 2022-12-05
Attending: STUDENT IN AN ORGANIZED HEALTH CARE EDUCATION/TRAINING PROGRAM
Payer: COMMERCIAL

## 2022-12-05 ENCOUNTER — APPOINTMENT (OUTPATIENT)
Dept: CARDIOLOGY | Facility: CLINIC | Age: 62
DRG: 273 | End: 2022-12-05
Attending: STUDENT IN AN ORGANIZED HEALTH CARE EDUCATION/TRAINING PROGRAM
Payer: COMMERCIAL

## 2022-12-05 ENCOUNTER — PATIENT OUTREACH (OUTPATIENT)
Dept: CARDIOLOGY | Facility: CLINIC | Age: 62
End: 2022-12-05

## 2022-12-05 VITALS
HEIGHT: 57 IN | DIASTOLIC BLOOD PRESSURE: 65 MMHG | TEMPERATURE: 97.8 F | RESPIRATION RATE: 19 BRPM | WEIGHT: 109.35 LBS | SYSTOLIC BLOOD PRESSURE: 114 MMHG | BODY MASS INDEX: 23.59 KG/M2 | HEART RATE: 81 BPM | OXYGEN SATURATION: 98 %

## 2022-12-05 LAB
ALBUMIN SERPL BCG-MCNC: 3.6 G/DL (ref 3.5–5.2)
ALP SERPL-CCNC: 107 U/L (ref 35–129)
ALT SERPL W P-5'-P-CCNC: 32 U/L (ref 10–50)
ANION GAP SERPL CALCULATED.3IONS-SCNC: 16 MMOL/L (ref 7–15)
AST SERPL W P-5'-P-CCNC: 55 U/L (ref 10–50)
BILIRUB SERPL-MCNC: 1.9 MG/DL
BUN SERPL-MCNC: 32.9 MG/DL (ref 8–23)
CALCIUM SERPL-MCNC: 9.1 MG/DL (ref 8.8–10.2)
CHLORIDE SERPL-SCNC: 96 MMOL/L (ref 98–107)
CREAT SERPL-MCNC: 1.08 MG/DL (ref 0.51–1.17)
DEPRECATED HCO3 PLAS-SCNC: 18 MMOL/L (ref 22–29)
ERYTHROCYTE [DISTWIDTH] IN BLOOD BY AUTOMATED COUNT: 16.8 % (ref 10–15)
GFR SERPL CREATININE-BSD FRML MDRD: 58 ML/MIN/1.73M2
GLUCOSE SERPL-MCNC: 70 MG/DL (ref 70–99)
HCT VFR BLD AUTO: 42.3 % (ref 35–53)
HGB BLD-MCNC: 14.4 G/DL (ref 11.7–17.7)
LACTATE SERPL-SCNC: 1.2 MMOL/L (ref 0.7–2)
LVEF ECHO: NORMAL
MAGNESIUM SERPL-MCNC: 2 MG/DL (ref 1.7–2.3)
MCH RBC QN AUTO: 35.5 PG (ref 26.5–33)
MCHC RBC AUTO-ENTMCNC: 34 G/DL (ref 31.5–36.5)
MCV RBC AUTO: 104 FL (ref 78–100)
PLATELET # BLD AUTO: 63 10E3/UL (ref 150–450)
POTASSIUM SERPL-SCNC: 3.8 MMOL/L (ref 3.4–5.3)
PROT SERPL-MCNC: 6.5 G/DL (ref 6.4–8.3)
RBC # BLD AUTO: 4.06 10E6/UL (ref 3.8–5.9)
SODIUM SERPL-SCNC: 130 MMOL/L (ref 136–145)
WBC # BLD AUTO: 4.7 10E3/UL (ref 4–11)

## 2022-12-05 PROCEDURE — 250N000013 HC RX MED GY IP 250 OP 250 PS 637: Performed by: STUDENT IN AN ORGANIZED HEALTH CARE EDUCATION/TRAINING PROGRAM

## 2022-12-05 PROCEDURE — 93321 DOPPLER ECHO F-UP/LMTD STD: CPT | Mod: 26 | Performed by: INTERNAL MEDICINE

## 2022-12-05 PROCEDURE — 93325 DOPPLER ECHO COLOR FLOW MAPG: CPT | Mod: 26 | Performed by: INTERNAL MEDICINE

## 2022-12-05 PROCEDURE — 83735 ASSAY OF MAGNESIUM: CPT | Performed by: STUDENT IN AN ORGANIZED HEALTH CARE EDUCATION/TRAINING PROGRAM

## 2022-12-05 PROCEDURE — 85027 COMPLETE CBC AUTOMATED: CPT | Performed by: STUDENT IN AN ORGANIZED HEALTH CARE EDUCATION/TRAINING PROGRAM

## 2022-12-05 PROCEDURE — 83605 ASSAY OF LACTIC ACID: CPT | Performed by: STUDENT IN AN ORGANIZED HEALTH CARE EDUCATION/TRAINING PROGRAM

## 2022-12-05 PROCEDURE — 250N000013 HC RX MED GY IP 250 OP 250 PS 637

## 2022-12-05 PROCEDURE — 93325 DOPPLER ECHO COLOR FLOW MAPG: CPT

## 2022-12-05 PROCEDURE — 97530 THERAPEUTIC ACTIVITIES: CPT | Mod: GO

## 2022-12-05 PROCEDURE — 93308 TTE F-UP OR LMTD: CPT | Mod: 26 | Performed by: INTERNAL MEDICINE

## 2022-12-05 PROCEDURE — 99239 HOSP IP/OBS DSCHRG MGMT >30: CPT | Mod: 25 | Performed by: INTERNAL MEDICINE

## 2022-12-05 PROCEDURE — 93321 DOPPLER ECHO F-UP/LMTD STD: CPT

## 2022-12-05 PROCEDURE — 82310 ASSAY OF CALCIUM: CPT | Performed by: STUDENT IN AN ORGANIZED HEALTH CARE EDUCATION/TRAINING PROGRAM

## 2022-12-05 PROCEDURE — 36415 COLL VENOUS BLD VENIPUNCTURE: CPT | Performed by: STUDENT IN AN ORGANIZED HEALTH CARE EDUCATION/TRAINING PROGRAM

## 2022-12-05 PROCEDURE — 250N000012 HC RX MED GY IP 250 OP 636 PS 637

## 2022-12-05 PROCEDURE — 80053 COMPREHEN METABOLIC PANEL: CPT | Performed by: STUDENT IN AN ORGANIZED HEALTH CARE EDUCATION/TRAINING PROGRAM

## 2022-12-05 RX ORDER — FUROSEMIDE 40 MG
40 TABLET ORAL
Status: DISCONTINUED | OUTPATIENT
Start: 2022-12-05 | End: 2022-12-05 | Stop reason: HOSPADM

## 2022-12-05 RX ORDER — FUROSEMIDE 40 MG
40 TABLET ORAL
Qty: 120 TABLET | Refills: 3 | Status: ON HOLD | OUTPATIENT
Start: 2022-12-05 | End: 2023-01-29

## 2022-12-05 RX ADMIN — APIXABAN 5 MG: 5 TABLET, FILM COATED ORAL at 07:54

## 2022-12-05 RX ADMIN — ATENOLOL 50 MG: 25 TABLET ORAL at 07:51

## 2022-12-05 RX ADMIN — SPIRONOLACTONE 50 MG: 25 TABLET, FILM COATED ORAL at 07:50

## 2022-12-05 RX ADMIN — LORATADINE 10 MG: 10 TABLET ORAL at 07:50

## 2022-12-05 RX ADMIN — EMPAGLIFLOZIN 10 MG: 10 TABLET, FILM COATED ORAL at 07:51

## 2022-12-05 RX ADMIN — FUROSEMIDE 60 MG: 40 TABLET ORAL at 07:51

## 2022-12-05 RX ADMIN — GABAPENTIN 100 MG: 100 CAPSULE ORAL at 07:51

## 2022-12-05 RX ADMIN — Medication 3 MG: at 07:51

## 2022-12-05 ASSESSMENT — ACTIVITIES OF DAILY LIVING (ADL)
ADLS_ACUITY_SCORE: 27
ADLS_ACUITY_SCORE: 29

## 2022-12-05 NOTE — DISCHARGE SUMMARY
[facility]    Cardiology Discharge Summary- Advanced Heart Failure Cardiology         Date of Admission:  11/19/2022  Date of Discharge:  12/5/2022 12:16 PM  Discharging Provider: Marcos Garces MD, PhD (supervised by Rosalia Leon MD, PhD)    Discharge Diagnoses   1. Ambulatory Cardiogenic shock  2. Ventricular tachycardia (Frequent, non-sustained, s/p Ablation by Dr. Eaton)   3. Acute on chronic diastolic heart failure  4. Severe tricuspid regurgication  5. Atrial Fibrillation  6. RV dysfunction  7. Acute Renal Failure  8. Acute on chronic hyponatremia  9. Acute on Chronic thrombocytopenia  10. Rheumatoid arthritis  11. Remote history of VSD (in 1969)  12. History of diffuse B cell lymphoma (s/p chemotherapy, now presumed to be in remission)    Follow-ups Needed After Discharge     Unresulted Labs Ordered in the Past 30 Days of this Admission     Date and Time Order Name Status Description    12/4/2022 10:01 PM Magnesium In process     12/4/2022 10:01 PM Comprehensive metabolic panel In process     12/4/2022  3:04 PM Bld morphology pathology review In process       These results will be followed up by [facility] clinic and Heme Onc service.     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 Etiology thought to most likely secondary to frequent non sustained ventricular tachycardia in the setting of possible restrictive/constrictive physiology given her malignancy history (albeit presumably in remission) with CAD ruled out as patient had non obstructive disease on coronary angiogram done on 11/28/22. Patient was transferred to the cardiac ICU for acute management of low output cardiogenic shock. She is not s/p VT ablation on 12/1/22 with stabilized hemodynamics and successful resumption of her home medications with increased diuretic dosing and adddition of an SGLT2 inhibitor. Prior to discharge patient reported drastic improvement in overall functional status with minimal dyspnea         Problem Course with Plan:   1. Acute on chronic diastolic heart failure with dilated and mildly reduced RV function (LVEF 55-60%)  2. Ambulatory cardiogenic shock  3. Frequent, non-sustained ventricular tachycardia s/p VT Ablation (by Dr. Eaton on 12/1/22)  Patient was admitted with signs of decompensated heart failure c/b frequent NSVT initially not responsive to intermittent IV diuresis with worsening acute renal failure. BRE done on 11/22/22 notable for severe TR with RV dilation. Coronary angiogram was also done on 11/22/22 which showed mild non-obstructive CAD. RHC on 11/28/22 showed evidence of cardiogenic shock with elevated biventricular filling pressures and CI/CO of 1.2/1.7. Overall picture was concerning or cardiogenic shock likely 2/2 hypoperfusion stated in the setting of frequent runs of ventricular tachycardia with a possibility of underlying restrictive/constrictive physiology given history of malignant pericardial effusion. Patient was transferred to the ICU and started on a Lidocaine drip for acute management.  Case was discussed with EP (Dr. Eaton) and patient had a successful VT ablation which was done on 12/1/22.   Pre-Ablation: 11/28/22 RHC: CVP 24, PA  48/27/34, PCWP 23, SVO2 46, CI 1.1, CO 1.6  Post Ablation and Diuresis: 12/3/22 RHC: CVP 16, PA 41/22/28, PCWP 15, SVO2 69, CI 2.1, CO 3.0  Post ablation, patient was diuresed to a dry weight of 109 lbs and restarted on her home neurohormonal heart failure medications. She remained stable and back to her baseline AFib rhythmin with heart rates in the 80s - 90s a/w minimal ectopies (intermittent PVCs on telemetry monitoring prior to discharge).   - Volume status: euvolemic, dry weight 109 lbs.    - Start Furosemide at 40 mg bid (was on 20 mg daily prior to admission)  - Beta-blocker: Continue PTA Atenolol 50 mg bid   - ACE/ARB/ARNI: None  - SGLT2:  Start on Empagliflozin 10 mg daily (initiated this admission)  - MRA: Continue PTA Spironolactone 50 mg daily  - Anti-arrhythmic: none, s/p VT ablation,    - stopped PTA digoxin per EP recommendations  - Anticoagulation: given history of Afib, continue PTA Apixaban 5 mg bid  - Follow up with [facility] in one week with CBC, BMP and Magnesium  - Patient is interested in cardiomems and will like to discuss how to go about obtaining one with [facility] HF cardiologists and/or KEVIN.   - Follow up with EP specialists (Dr. Eaton and/or Maria Esther Cutler NP)    4.SSS s/p PPM (2019)  5. Atrial Tachyarrhythmias  [id number] = 2 (+ htn, + gender).   - Continue PTA Apixaban 5 mg bid   - Continue PTA Atenolol  50 mg bid   - Follow up with EP as above    6. Severe tricuspid regurgitation  Secondary to non coaptation of tricuspid valves. Seen on BRE from 11/22/22 and remained severe on repeat TTE on 12/5/22 despite achieving clinical euvolemia and presumed dry weight. Patient was evaluated by surgery during this admission (Dr. Cope) who thought she may be a candidate for surgical intervention but on day of discharge, patient will like to hold off on pursuing surgical intervention given improvement in her symptoms with plan to re-assess in the future.   - Follow up with [facility]  as above for continuing management    7. Acute renal failure, resolved  Baseline creatinine (0.94 - 1.16). Creatinine of 1.19 on admission (11/19) peaked at 1.83 on 11/28/22 in the setting of cardiogenic shock but ultimately resolved back to baseline and at 1.06on day of discharge.   - BMP check at next [facility] clinic follow up in 1 week.      8. Acute on subacute hyponatremia; improved  Baseline sodium was ~136 until 4/2022 when it started to slowly downtrend to high 120s - low 130s. Sodium on admission was 125 and with slow downtrend to 119 on 11/28/22 and was given x1 dose of 15 mg Tolvaptam for acute management with slight improvement to 128. Levels subsequently improved to 130s s/p cardiogenic shock management. Sodium of 130 on day of discharge.   - BMP check at next [facility] clinic follow up in 1 week.      9. History of Diffuse Large B-cell Lymphoma (DLBCL)  Diagnosed in 5/2017. S/p RCHOP and ECHOP therapy (total 5 rounds). No history of radiation therapy. Deemed in complete remission in 2019 and graduated out of surveillance at that time. Amyloid labs (UPEP and SPEP) checked given concern for restrictive/constrictive heart failure physiology, found to be negative  - No acute issues on discharge.   - Outpatient Heme-Onc follow up.       10. Acute on chronic thrombocytopenia; improving  Baseline platelets 110s, was 106 on admission and currently at 89 on 11/30/22 --> 62 on 12/2/22--> 58 on 12/3 --> 56-->68 on 12/4 --> . Peripheral smear sent on 12/4.   -  Outpatient Heme-Onc follow up.     11. Rheumatoid arthritis  Stable  - Continue PTA Plaquenil 200 mg daily and Prednisone 3 mg daily  - Restarted PTA Gabapentin 100 mg qid on 12/3  =================================  Discharge plan discussed with Dr. Leon.     Marcos Garces MD, PhD  Cardiology Fellow  Pager: [phone]  =================================    Consultations This Hospital Stay   PHARMACY LIAISON FOR MEDICATION COVERAGE CONSULT  NURSING TO CONSULT  FOR VASCULAR ACCESS CARE IP CONSULT  CARDIOLOGY ELECTROPHYSIOLOGY (EP) IP CONSULT  CARDIOTHORACIC SURGERY ADULT IP CONSULT  CARE MANAGEMENT / SOCIAL WORK IP CONSULT  NURSING TO CONSULT FOR VASCULAR ACCESS CARE IP CONSULT  PHYSICAL THERAPY ADULT IP CONSULT  OCCUPATIONAL THERAPY ADULT IP CONSULT  DENTISTRY ADULT IP CONSULT  PHARMACY IP CONSULT  PHARMACY IP CONSULT  SMOKING CESSATION PROGRAM IP CONSULT  SMOKING CESSATION PROGRAM IP CONSULT    Code Status   Full Code        Marcos Garces MD, PhD  Cardiology Fellow  Pager: [phone]  ______________________________________________________________________    Physical Exam   Vital Signs: Temp: 97.8  F (36.6  C) Temp src: Oral BP: 114/65 Pulse: 81   Resp: 19 SpO2: 98 %      Weight: 109 lbs 5.57 oz     GEN:pleasant, and interactive, NAD  HEENT: no icterus, eomi, mmm  CV:  Tachycardic rate, irregular rhythm, normal s1/s2, diffuse 3/6 systolic murmur, CVP ~14 cm.  CHEST: CTAB no rales or wheezing  ABD: soft, non-tender, normal active bowel sounds  EXTR: pulses +1. No clubbing, cyanosis or edema, arthritic distal changes  NEURO: alert oriented, speech fluent/appropriate, motor grossly nonfocal         Primary Care Physician   Gala Stringer    Discharge Orders       Significant Results and Procedures   Most Recent 3 CBC's:Recent Labs   Lab Test 12/05/22  0642 12/04/22  1506 12/04/22  0333   WBC 4.7 6.2 6.3   HGB 14.4 15.2 15.7   * 104* 105*   PLT 63* 64* 56*     Most Recent 3 BMP's:Recent Labs   Lab Test 12/05/22  0642 12/04/22  1506 12/04/22  0333   * 130* 132*   POTASSIUM 3.8 4.2 3.8   CHLORIDE 96* 94* 95*   CO2 18* 21* 24   BUN 32.9* 31.7* 29.3*   CR 1.08 1.06 1.12   ANIONGAP 16* 15 13   VIKTORIYA 9.1 8.8 9.0   GLC 70 119* 71     Most Recent 2 LFT's:Recent Labs   Lab Test 12/05/22  0642 12/04/22  1506   AST 55* 59*   ALT 32 35   ALKPHOS 107 121   BILITOTAL 1.9* 2.4*     Most Recent 3 Hemoglobins:Recent Labs   Lab Test 12/05/22  0642 12/04/22  1506 12/04/22  0331    HGB 14.4 15.2 15.7     Most Recent 3 Troponin's:Recent Labs   Lab Test 12/12/19  1153 09/10/17  1717 09/10/17  1333 05/29/17  0720 05/29/17  0703   TROPI <0.015 0.043 0.042   < >  --    TROPONIN  --   --   --   --  0.19*    < > = values in this interval not displayed.     Most Recent 3 BNP's:Recent Labs   Lab Test 11/19/22  1345 10/13/21  0918 05/26/21  1057 12/12/19  1153 06/26/17  0647   NTBNPI 1,913*  --   --  485 2,348*   NTBNP  --  1,425* 1,274*  --   --    ,   Results for orders placed or performed during the hospital encounter of 11/19/22   Chest XR,  PA & LAT    Narrative    EXAM: XR CHEST 2 VIEWS 11/19/2022 12:41 PM    HISTORY: CHF, short of breath.    COMPARISON: 12/14/2019.    TECHNIQUE: Upright frontal and lateral views of the chest.    FINDINGS: Midline trachea. Stable enlarged cardiac silhouette. Stable  post surgical changes of median sternotomy. Left chest wall  implantable pacemaker leads project over the right atrium and  ventricle.  Engorged but distinct pulmonary vasculature. Mild bibasilar streaky  opacities. No evidence consolidation. No pneumothorax or pleural  effusion. Upper abdomen within normal limits.      Impression    IMPRESSION:   Cardiomegaly with engorged pulmonary vasculature, with mild perihilar  and bibasilar interstitial prominence, suspicious for pulmonary edema.  No consolidation.    I have personally reviewed the examination and initial interpretation  and I agree with the findings.    MAYELA BAR DO         SYSTEM ID:  [id number]   Abdomen US, limited (RUQ only)    Narrative    EXAMINATION: Limited Abdominal Ultrasound, 11/19/2022 4:10 PM     COMPARISON: CT 9/10/2019    HISTORY: Elevated bilirubin and LFTs, mild right upper quadrant  tenderness    FINDINGS:     Fluid: Trace ascites in the right upper quadrant.    Liver: The liver demonstrates normal echotexture, measuring 16.6 cm in  craniocaudal dimension. There is no focal mass.     Gallbladder: Gallbladder wall is  diffusely thickened measuring 4 mm.  No gallstones or biliary sludge.    Bile Ducts: Both the intra- and extrahepatic biliary system are of  normal caliber.  The common bile duct measures 4 mm in diameter.    Pancreas: Visualized portions of the head and body of the pancreas are  unremarkable. Pancreas is partially obscured    Kidney: The right kidney measures 9.4 cm long. There is no  hydronephrosis or hydroureter, no shadowing renal calculi, cystic  lesion or mass.       Impression    IMPRESSION:       1. Borderline hepatomegaly. Prominent hepatic veins; consider  correlation for congestive hepatomegaly secondary to cardiac  dysfunction. No focal liver lesion demonstrated.  2. Trace ascites.  3. Diffusely thickened gallbladder wall, nonspecific, possibly  congestive with cardiac dysfunction.    I have personally reviewed the examination and initial interpretation  and I agree with the findings.    LUKE HOGUE MD         SYSTEM ID:  [id number]   US Carotid Bilateral    Narrative    BILATERAL CAROTID DUPLEX DOPPLER ULTRASOUND 11/22/2022 9:23 AM    CLINICAL HISTORY: pre surgical work up    COMPARISON: None available.    REFERRING PROVIDER: CONCEPCION MARY    TECHNIQUE: Grayscale (B-mode) and duplex and spectral Doppler  ultrasound of the common carotid, extracranial internal carotid,  external carotid, and vertebral artery origins. Velocity measurements  obtained with angle correction at or less than 60 degrees.    FINDINGS:    RIGHT SIDE:     Plaque Morphology: Minimal plaque.       Proximal CCA: 56/17 cm/s, 51/13 cm/s     Mid CCA: 84/20 cm/s     Distal CCA: 72/19 cm/s           External CA: 73/13 cm/s       Proximal ICA: 29/16 cm/s     Mid ICA: 65/25 cm/s     Distal ICA: 61/18 cm/s       Vertebral artery: Antegrade: 46/9 cm/s     ICA/CCA ratio: 0.90     LEFT SIDE:     Plaque Morphology: Minimal plaque.        Proximal CCA: 60/18 cm/s     Mid CCA: 80/25 cm/s     Distal CCA: 55/16 cm/s           External CA:  89/14 cm/s       Proximal ICA: 47/14 cm/s     Mid ICA: 56/16 cm/s     Distal ICA: 75/28 cm/s       Vertebral artery: Antegrade: 25/4 cm/s     ICA/CCA ratio: 1.36       Impression    IMPRESSION:    1. RIGHT ICA: Less than 50% diameter narrowing by grayscale imaging  and sonographic velocity criteria.    2. LEFT ICA:  Less than 50% diameter narrowing by grayscale imaging  and sonographic velocity criteria.    [employer] [facility] Acc[employer] Commission Updated Recommendations for  Carotid Stenosis Interpretation Criteria - October 2021.  https://intersocietal.org/wp-content/uploads/2021/10/IAC-Vascular-Test  pf-Ioortkyvwadur_Cwqpgck-Dlbdklyyvwziyxx-for-Carotid-Stenosis-Interpre  ation-Criteria.pdf    Greater than 70% diameter stenosis criteria per - Journal of Vascular  Surgery Vol. 75Issue 1Supplement [id number] Published online: June 18, 2021          Normal            ICA PSV: < 180 cm/s            Plaque Estimate: None            ICA/CCA PSV Ratio: < 2.0            ICA EDV: < 40 cm/s         < 50%            ICA PSV: < 180 cm/s            Plaque Estimate: < 50%            ICA/CCA PSV Ratio: < 2.0            ICA EDV: < 40 cm/s         50 - 69%            ICA PSV: 180 - 230 cm/s            Plaque Estimate: > 50%            ICA/CCA PSV Ratio: 2.0 - 4.0            ICA EDV: 40 - 100 cm/s         > 70% but less than near occlusion            ICA PSV: > 230 cm/s            Plaque Estimate: > 50%            AND either            ICA EDV: > 100 cm/s            or            ICA/CCA PSV Ratio: > 4.0         Near occlusion            ICA PSV: > 230 cm/s            Plaque Estimate: Visible            ICA/CCA PSV Ratio: Variable            ICA EDV: Variable         Total occlusion            ICA PSV: Undetectable            Plaque Estimate: Visible, no detectable lumen            ICA/CCA PSV Ratio: N/A            ICA EDV: N/A                                          Additional criteria from vascular surgery     > 80%       EDV > 120  cm/s    I have personally reviewed the examination and initial interpretation  and I agree with the findings.    ALEK MACIEL MD         SYSTEM ID:  [id number]   CT Dental wo Contrast    Narrative    CT DENTAL WO CONTRAST 11/23/2022 10:33 AM    History:  pre-surgical planning    Comparison:  None      TECHNIQUE: 3-D reconstruction by the technologists, with curved  multiplanar reformat of thin section imaging through the mandible and  maxilla obtained without intravenous contrast.    FINDINGS:  No significant soft tissue swelling or mass. Normal facial bone  alignment. No bony erosion. No significant dental caries. Nonerrupted  and malrotated posterior most right maxillary molar which crown abuts  the apex the adjacent maxillary molar. Bilateral dental crowns are  present on both maxillary and mandibular molars.    Minimal mucosal thickening of the right greater than left maxillary  sinuses. Mastoid air cells are clear where visualized.    Partially visualized degenerative changes of the cervical spine with  partial ankylosis of the C2-C3 vertebral bodies as well as the lateral  elements at C2-3, C3-4, and C4-5. No significant spinal canal stenosis      Impression    IMPRESSION: No dental abscess.    I have personally reviewed the examination and initial interpretation  and I agree with the findings.    MARYSE VIVEROS MD         SYSTEM ID:  [id number]   XR Chest Port 1 View    Narrative    EXAM: XR CHEST PORT 1 VIEW  11/28/2022 7:52 PM     HISTORY:  PA       COMPARISON:  11/19/2022    TECHNIQUE: Portable AP supine view of the chest.    FINDINGS:   Left chest wall implantable cardiac defibrillator with leads in the  right atrium and ventricle.. Right IJ [place] with tip projecting  over the expected location of the proximal main pulmonary artery. The  surgical drains the chest with intact median sternotomy wires.    The trachea is midline. The cardiomediastinal silhouette is stably  enlarged. The pulmonary vasculature  is engorged. No appreciable  pneumothorax or pleural effusion. Unchanged mild streaky perihilar and  bibasilar opacities. No focal airspace opacity. No acute osseous  abnormality.      Impression    IMPRESSION:  1. [place] catheter tip projects over the expected location of the  proximal right main pulmonary artery.  2. Cardiomegaly with engorged pulmonary vasculature with unchanged  streaky perihilar and bibasilar opacities that likely represent  pulmonary edema versus atelectasis.    I have personally reviewed the examination and initial interpretation  and I agree with the findings.    JUVENAL GALDAMEZ MD         SYSTEM ID:  [id number]   XR Chest Port 1 View    Narrative    EXAM: XR CHEST PORT 1 VIEW  11/29/2022 12:40 AM     HISTORY:  swan positioning       COMPARISON:  Chest radiograph 11/28/2022    FINDINGS:   Portable semiupright view of the chest. Left chest wall implantable  cardiac defibrillator with leads are stable. Right IJ [place] with  tip projecting over the right main pulmonary artery. Stable position  of mediastinal drain.     Postoperative chest with intact mediastinal wires. The trachea is  midline. The cardiac silhouette enlarged. Prominent pulmonary  vasculature. No appreciable pneumothorax or pleural effusion.  Decreased mild streaky perihilar and bibasilar opacities. No focal  airspace opacity. No acute osseous abnormality.      Impression    IMPRESSION:  1. [place] catheter tip projects over the proximal right main  pulmonary artery.  2. Decreased streaky perihilar and bibasilar opacities that likely  represent pulmonary edema and/or atelectasis.    I have personally reviewed the examination and initial interpretation  and I agree with the findings.    LUKE HOGUE MD         SYSTEM ID:  [id number]   XR Chest Port 1 View    Narrative    Exam: Portable chest 1 view, 11/30/2022 6:20 AM    Indication: [place] position    Comparison: Chest radiograph 11/29/2022    Findings:   Portable  supine view of the chest. Right IJ [place]-Coby catheter tip  projects over the right pulmonary artery. Left chest wall chest wall  cardiac device with leads in stable position. Postoperative chest with  intact median sternotomy wires. The cardiac silhouette is stably  enlarged. Bilateral diffuse hazy opacities. No pleural effusion or  pneumothorax.      Impression    Impression:   1. Right IJ [place]-Coby catheter tip projects over the right pulmonary  artery.  2. Increased bilateral diffuse hazy opacities, likely atelectasis  and/or edema.         I have personally reviewed the examination and initial interpretation  and I agree with the findings.    JORDYN ESQUIVEL MD         SYSTEM ID:  [id number]   XR Chest Port 1 View    Narrative    Exam: XR CHEST PORT 1 VIEW, 11/30/2022 7:00 PM    Indication: [place] postioning    Comparison: Chest radiograph 11/30/2022    Findings:   Portable supine view of the chest. Right IJ [place] catheter tip  projects over the distal right pulmonary artery. Left chest wall chest  wall cardiac device with leads in stable position. Postoperative chest  with intact median sternotomy wires. The cardiac silhouette is stably  enlarged. Bilateral diffuse interstitial thickening. No pleural  effusion or pneumothorax.      Impression    Impression:   1. Right IJ [place]-C[place] catheter tip projects over the distal right  pulmonary artery.  2. Stable cardiomegaly with bilateral interstitial thickening likely  representing interstitial pulmonary edema.         I have personally reviewed the examination and initial interpretation  and I agree with the findings.    FERNANDEZ CHIN MD         SYSTEM ID:  [id number]   XR Chest Port 1 View    Narrative    EXAM: XR CHEST PORT 1 VIEW  12/1/2022 12:51 AM     HISTORY:  swan positioning       COMPARISON: Chest radiograph 11/30/2022    FINDINGS:     Portable semiupright view of the chest. Right IJ [place]-C[place] catheter  tip projects over the right interlobar artery. Left chest  wall  compatible cardiac device with leads in stable position. Multiple  surgical chest with intact median sternotomy wires. The cardiac  silhouette is stably enlarged. Mildly increased bilateral interstitial  opacities. No pleural effusion or pneumothorax.    No acute osseous abnormality. Visualized upper abdomen is  unremarkable.        Impression    IMPRESSION:   1. Right IJ [place]-Coby catheter tip projects over the right interlobar  artery, should be retracted.  2. Postsurgical chest with mildly increased bilateral interstitial  opacities, likely representing edema.  3. Cardiomegaly.    I have personally reviewed the examination and initial interpretation  and I agree with the findings.    JORDYN ESQUIVEL MD         SYSTEM ID:  [id number]   XR Chest Port 1 View    Narrative    EXAM: XR CHEST PORT 1 VIEW 12/1/2022 9:52 AM    HISTORY: 62-year-old female with history of VSD and cardiac arrest.  Evaluate swan reposition.     COMPARISON: 0049 hours.    TECHNIQUE: Portable AP view of the chest.    FINDINGS:   Interval retraction of right IJ [place]-Coby catheter with tip now  projecting over the right pulmonary artery at midline. Postsurgical  chest with intact median sternotomy wires. Left chest cardiac pacer  with leads stable in position compared to prior.    Midline trachea. Stably enlarged cardiomediastinal silhouette with  enlarged pulmonary artery. Distinct pulmonary vasculature. No  significant pleural effusion. No discernible pneumothorax. Normal lung  volumes. No focal airspace opacities. Unremarkable upper abdomen. No  acute or suspicious osseous abnormalities. Mild right convex spinal  curvature with mild bilevel degenerative changes of the thoracic  spine. Unremarkable soft tissues.      Impression    IMPRESSION:   1.  Interval retraction of right IJ [place]-Coby catheter with tip  projecting over midline in the right pulmonary artery.  2.  Stably enlarged cardiac silhouette with otherwise stable  postsurgical  hardware and support devices.    I have personally reviewed the examination and initial interpretation  and I agree with the findings.    ALINA HOWARD MD         SYSTEM ID:  [id number]   X-ray Chest 1 vw port    Narrative    EXAM: XR CHEST PORT 1 VIEW  12/1/2022 5:07 PM     HISTORY:  Status post pacer/ICD       COMPARISON:  Same day radiograph    TECHNIQUE: Single portable AP supine view of the chest    FINDINGS:   Right IJ [place]-Coby catheter with tip projecting over the right  pulmonary artery. Left chest wall implantable cardiac defibrillator  with leads in the radius are unremarkable. The surgical drains in the  chest with intact median sternotomy wires.    The trachea is midline. The cardiomediastinal silhouette is stably  enlarged. Atherosclerotic calcifications of the aortic arch. No  appreciable pneumothorax or pleural effusion. No focal airspace  opacity. No acute osseous abnormality. Stable mild scoliotic curvature  of the spine.      Impression    IMPRESSION:  1. No new airspace disease.  2. Stable cardiomegaly with stable postsurgical hardware and support  devices.    I have personally reviewed the examination and initial interpretation  and I agree with the findings.    ALINA HOWARD MD         SYSTEM ID:  [id number]   X-ray Chest 2 vws*    Narrative    Exam: XR CHEST 2 VIEWS, 12/2/2022 10:56 AM    Indication: Status post pacer/ICD    Comparison: Same day chest x-ray    Findings:   Cardiac device with leads projected over right atrium and right  ventricle. The pulmonary catheter tip projects over distal right  pulmonary artery. Median sternotomy wires. Cardiomegaly. No  pneumothorax or effusions. Increased perihilar interstitial opacities.      Impression    Impression:   1. Cardiac device leads intact and stable in position.  2. Cardiomegaly with increased perihilar edema/atelectasis.    I have personally reviewed the examination and initial interpretation  and I agree with the findings.    ALINA  MD ANITA         SYSTEM ID:  [id number]   XR Chest Port 1 View    Narrative    EXAM: XR CHEST PORT 1 VIEW  12/2/2022 12:52 AM     HISTORY:  recheck swan positioning       COMPARISON:  Chest radiograph 12/1/2022    FINDINGS:     Portable supine view of the chest. Patient rotated to the left. Right  IJ Bayamon-Coby catheter tip projects over the right pulmonary artery.  Left chest wall compatible cardiac device with lead in stable  position. Post surgical chest with intact median sternotomy wires.  Trachea is midline. The cardiac silhouette is stably enlarged. No  focal airspace opacity, pleural effusion or appreciable pneumothorax.    No acute osseous abnormality. Visualized upper abdomen is  unremarkable.        Impression    IMPRESSION:   1. Right IJ Bayamon-Coby catheter tip projects over the right pulmonary  artery.  2. Stable postsurgical chest without acute airspace disease.    I have personally reviewed the examination and initial interpretation  and I agree with the findings.    LUKE HOGUE MD         SYSTEM ID:  [id number]   XR Chest Port 1 View    Narrative    EXAM: XR CHEST PORT 1 VIEW  12/3/2022 1:00 AM     HISTORY:  swan positioning       COMPARISON:  12/2/2022    FINDINGS:     Portable upright view of the chest. Right IJ Bayamon-Coby catheter tip  projects over the distal right pulmonary, position advanced compared  to prior. Left chest wall implantable cardiac device with lead in  stable position. Post surgical chest with intact mediastinal wires.  Trachea is midline. Borderline cardiomegaly. Calcification at the  aortic knob from atherosclerosis. Improved aeration of the lungs. No  focal airspace opacity, pleural effusion or appreciable pneumothorax.    No acute osseous abnormality. Visualized upper abdomen is  unremarkable.        Impression    IMPRESSION:   1. Right IJ Bayamon-Coby catheter tip projects over distal right  pulmonary artery.  2. Post surgical chest without acute cardiopulmonary  findings.  Borderline cardiomegaly.    I have personally reviewed the examination and initial interpretation  and I agree with the findings.    JUVENAL GALDAMEZ MD         SYSTEM ID:  [id number]   EP Ablation    Narrative    EP PROCEDURE NOTE    Procedures:  1. PVC/VT ablation.  2. Comprehensive EP study.  3. Isuprel Infusion.  4. 3D electro-anatomic mapping.    Attendingt: Juan Eaton MD  Procedure Date: 12/1/2022    Pre-operative Diagnosis:  PVC/NSVT in the setting of cardiogenic shock  Post-operative diagnosis: s/p PVC/VT ablation  Complications: None.      Clinical Profile:  Ms. Michel is a 61 year old female who has a past medical history   significant for VSD s/p repair 1969, RA, HTN, insomnia, atrial   tachyarrhythmias, cardiac arrest, SSS s/p PPM 12/2019, HFpEF, mod/severe   TR, DLBCL, and exudative pericardial effusion. Now presenting with   clinical right sided HF, severe TR, and hypervolemia. Noted to have fairly   frequent PVCs and very frequent NSVT while in her permanent AF/AFL. NSVT   was symptomatic and contributing to her hemodynamic instability with   limitation in the ability to treat her cardiogenic shock with pressors.   She cannot be treated with amiodarone because of previous history of   cardiac arrest with this medication, and she has central side effects with   lidocaine.    PROCEDURE  The risks and benefits of the procedure were explained to the patient in   full.  The risks include, but are not limited to: pain, bleeding, blood   transfusion reactions, arrhythmia, dissection of vessels, cardiac   perforation, pericardial effusion, and death. Informed Consent was   obtained. The patient was brought to the EP lab in a fasting and   hemodynamically stable condition. The patient was prepped and draped in a   sterile fashion.   Sedation: This procedure was performed under moderate sedation under the   supervision of the the Staff Physician. The patient was assessed   immediately prior to  administering sedation. The patient received 0.5 mg   Versed and 50 mcg Fentanyl for a total procedural sedation time of 158   minutes. Heart rate, blood pressure, oxygen saturation, and patient   responses were monitored throughout the procedure with the assistance of   the RN.     Sheaths and Catheters:  After local anesthesia with 2% lidocaine, vascular access was obtained   using the modified Seldinger technique for the following access points:    Right Femoral Vein:   - 7Fr Locking Sheath: A decapolar catheter was positioned into the   coronary sinus and RV.    - 8.5Fr Sheath, later exchanged for a RAMP long sheath, through which a   Biosense Loera DF Smartouch was placed into the RV.    Drugs and Maneuvers:  - Isuprel: used up to 5 mcg/min.     Arrhythmias Observed or Induced:  Clinical PVC observed: + in I, rr' in II, rss' in III, + AVL, - AVR, LBBB   pattern with T in V4-V5    Procedure Details:  The patient presented in [place] to the EP lab. After relaxation on the   table and while we were getting access, the frequency of the PVCs   diminished but were still occurring. We proceeded with mapping of the PVCs   using a combination of activation mapping and pace mapping to narrow down   the area of interest. We located the earliest EGM at the mid lateral wall   of the RV among trabeculation or rudimentary tricuspid valve pap muscle.   EGM was at -25 ms. We applied RFA to this area that resulted in   diminishing of the PVC. Because fo the lower frequency of the PVC at the   time of ablation, we did a waiting period of 45 min during which we tested   the patient with pacing, with or without isuprel and during washout twice.   No clinical PVC was seen. The patient had infrequent non clinical PVCs   that were rare.  AT this time, the procedure was terminated. The sheaths were pulled an   thew patient was transferred to the floor for monitoring.    Juan Eaton MD [facility]  Cardiology - Electrophysiology    Echo Complete     Value    LVEF  55-60%    Narrative    [id number]  ICD664  [id number]  [id number]^TYRA^CONCEPCION^EMELYN     [facility],[place]  Echocardiography Laboratory  [address] [place] [address]     Name: FIDELIA MIDDLETON  MRN: [id number]  : 1960  Study Date: 2022 08:15 AM  Age: 61 yrs  Gender: Female  Patient Location: [facility]  Reason For Study: Heart Failure  Ordering Physician: CONCEPCION MAYR  Performed By: Michelle Huizar     BSA: 1.5 m2  Height: 57 in  Weight: 128 lb  BP: 133/114 mmHg  ______________________________________________________________________________  Procedure  Limited Portable Echo Adult. Technically difficult study.  ______________________________________________________________________________  Interpretation Summary  Global and regional left ventricular function is normal with an EF of 55-60%.  Borderline right ventricular enlargement.  Global right ventricular function is normal.  Severe tricuspid insufficiency is present.  IVC diameter >2.1 cm collapsing <50% with sniff suggests a high RA pressure  estimated at 15 mmHg or greater.  This study was compared with the study from 22: No significant changes  noted. Specifically, TR is severe on both studies.     ______________________________________________________________________________  Left Ventricle  Global and regional left ventricular function is normal with an EF of 55-60%.  Left ventricular size is normal. Left ventricular wall thickness cannot  evaluate. Diastolic function not assessed due to atrial fibrillation.  Flattened septum is consistent with right ventricular volume overload.     Right Ventricle  Global right ventricular function is normal. Right ventricular wall thickness  is normal. Borderline right ventricular enlargement. A pacemaker lead is noted  in the right ventricle.     Atria  Severe biatrial enlargement is present.     Mitral Valve  Mild mitral  insufficiency is present.     Aortic Valve  Mild aortic valve calcification is present. Trace aortic insufficiency is  present.     Tricuspid Valve  Severe tricuspid insufficiency is present. PA pressure cannot be accurately  assessed due to severe TR.     Pulmonic Valve  Trace pulmonic insufficiency is present.     Vessels  The aorta root is normal. The thoracic aorta is normal. Dilation of the  inferior vena cava is present with abnormal respiratory variation in diameter.  IVC diameter >2.1 cm collapsing <50% with sniff suggests a high RA pressure  estimated at 15 mmHg or greater. Hepatic vein flow consistent with severe  tricuspid insufficiency is present.     Pericardium  No pericardial effusion is present.     Compared to Previous Study  This study was compared with the study from 22 . No significant changes  noted.  ______________________________________________________________________________  MMode/2D Measurements & Calculations  asc Aorta Diam: 3.1 cm     ______________________________________________________________________________  Report approved by: Milly Ca 2022 11:15 AM         Transesophageal Echocardiogram     Value    LVEF  55-60%    [facility]    [id number]  [facility]  [id number]  [id number]^TYRA^CONCEPCION^EMELYN     [facility],[place]  Echocardiography Laboratory  [address] [place] [address]     Name: FIDELIA MIDDLETON  MRN: [id number]  : 1960  Study Date: 2022 11:42 AM  Age: 61 yrs  Gender: Female  Patient Location: [facility]  Reason For Study: Tricuspid Regurgitation  Ordering Physician: CONCEPCION MARY  Performed By: Daphne Ralph     BSA: 1.5 m2  Height: 57 in  Weight: 122 lb  HR: 51  BP: 120/87 mmHg  Attestation  I was present during BRE probe placement by the fellow, Daphne Ralph. I  personally viewed the imaging and agree with the interpretation and report as  documented by the  fellow.  ______________________________________________________________________________  Interpretation Summary  Global and regional left ventricular function is normal with an EF of 55-60%.  Mild to moderate right ventricular dilation is present. Right ventricular  function is normal.  Severe tricuspid insufficiency is present due to failure of the valve leaflets  (septal-anterior) to coapt. There is no obvious leaflet tethering due to the  pacemaker lead. There are no valvular vegetations present.  No pericardial effusion is present.  ______________________________________________________________________________  Procedure  Transesophageal Echocardiogram with color and spectral Doppler performed. I  was present during BER probe placement by the Fellow. I personally viewed the  imaging and agree with the interpretation and report as documented by the  Fellow. 3D image acquisition, reconstruction, and real-time interpretation was  performed. Procedure location Echo Lab. Informed consent for Transesophegeal  echo obtained. BRE Probe #loaner was used during the procedure. Patient was  sedated using Fentanyl 50 mcg. Patient was sedated using Versed 2 mg. The  heart rate, respiratory rate, oxygen saturations, blood pressure, and response  to care were monitored throughout the procedure with the assistance of the  nurse. I determined this patient to be an appropriate candidate for the  planned sedation and procedure and have reassessed the patient immediately  prior to sedation and procedure. Total sedation time: 26 minutes of continuous  bedside 1:1 monitoring. The Transducer was inserted without difficulty . The  patient tolerated the procedure well. Complications None. The patient's rhythm  is paced. Good quality two-dimensional was performed and interpreted.     Left Ventricle  Left ventricular size is normal. Global and regional left ventricular function  is normal with an EF of 55-60%.     Right Ventricle  Global  right ventricular function is normal. Mild to moderate right  ventricular dilation is present. A pacemaker lead is noted in the right  ventricle.     Atria  The left atrial appendage is normal. It is free of spontaneous echo contrast  and thrombus. Severe right atrial enlargement is present. Severe left atrial  enlargement is present. The atrial septum is intact as assessed by color  Doppler . Septal bowing into LA, indicative of severely elevated right sided  pressures. A pacemaker lead is noted in the right atrium.     Mitral Valve  Trace to mild mitral insufficiency is present.     Aortic Valve  The aortic valve is tricuspid.     Tricuspid Valve  Tricuspid valve fails to coapt. There is failure of coaptation of the septal  and anterior leaflets. Severe tricuspid insufficiency is present. no tricuspid  stenosis.     Pulmonic Valve  The pulmonic valve is normal.     Vessels  Mild ascending aorta atherosclerosis. Dilated superior vena cava. Dilation of  the inferior vena cava is present with abnormal respiratory variation in  diameter.     Pericardium  No pericardial effusion is present.     ______________________________________________________________________________  Report approved by: Milly Olguin 2022 02:19 PM     ______________________________________________________________________________      Echo Limited     Value    LVEF  55-60%    [facility]    [id number]  [id number]  [id number]  [id number]^OSKAR^TYLER^PAPITO     [facility],[place]  Echocardiography Laboratory  [address] [place] [address]     Name: FIDELIA MIDDLETON  MRN: [id number]  : 1960  Study Date: 2022 07:51 AM  Age: 62 yrs  Gender: Female  Patient Location: [facility]  Reason For Study: Heart Failure  Ordering Physician: TYLER SCHMITT  Performed By: Nayeli Montoya RDCS     BSA: 1.4 m2  Height: 57 in  Weight: 109 lb  HR: 85  BP: 109/69  mmHg  ______________________________________________________________________________  Procedure  Limited Portable Echo Adult.  ______________________________________________________________________________  Interpretation Summary  Severe tricuspid insufficiency is present.  Moderate right ventricular dilation is present. Global right ventricular  function is mildly reduced.  Global and regional left ventricular function is normal with an EF of 55-60%.  IVC diameter >2.1 cm collapsing <50% with sniff suggests a high RA pressure  estimated at 15 mmHg or greater.  No pericardial effusion is present.  No significant changes noted.  ______________________________________________________________________________  Left Ventricle  Global and regional left ventricular function is normal with an EF of 55-60%.  Flattened septum is consistent with right ventricular volume overload.     Right Ventricle  Moderate right ventricular dilation is present. Global right ventricular  function is mildly reduced. A pacemaker lead is noted in the right ventricle.     Atria  Severe right atrial enlargement is present.     Mitral Valve  Moderate mitral annular calcification is present. Mild mitral insufficiency is  present.     Tricuspid Valve  Severe tricuspid insufficiency is present. The right ventricular systolic  pressure is approximated at 16.5 mmHg plus the right atrial pressure.     Pulmonic Valve  On Doppler interrogation, there is no significant stenosis or regurgitation.     Vessels  IVC diameter >2.1 cm collapsing <50% with sniff suggests a high RA pressure  estimated at 15 mmHg or greater. The inferior vena cava was dilated at 3.6 cm  without respiratory variability, consistent with increased right atrial  pressure.     Pericardium  No pericardial effusion is present.     Compared to Previous Study  No significant changes noted.     ______________________________________________________________________________  Doppler  Measurements & Calculations     TR max robby: 203.0 cm/sec  TR max P.5 mmHg     ______________________________________________________________________________  Report approved by: Milly Noriega 2022 08:28 AM         Cardiac Catheterization    Narrative      Right sided filling pressures are severely elevated.    Mild elevated pulmonary hypertension.    Left sided filling pressures are moderately elevated.    Reduced cardiac output level.    Hemodynamic data has been modified in Epic per physician review.    Prox LAD lesion is 30% stenosed.     Mild non-obstructive coronary artery disease    Cardiac Device Check - Inpatient     Value    Date Time Interrogation Session [id number]    Implantable Pulse Generator  Medtronic    Implantable Pulse Generator Model [id number] Claudia XT DR MRI    Implantable Pulse Generator Serial Number [id number]    Type Interrogation Session In Clinic    Clinic Name [facility]    Implantable Pulse Generator Type Pacemaker    Implantable Pulse Generator Implant Date 2019    Implantable Lead  Medtronic    Implantable Lead Model 5076 CapSureFix Novus MRI SureScan    Implantable Lead Serial Number [id number]    Implantable Lead Implant Date 2019    Implantable Lead Polarity Type Bipolar Lead    Implantable Lead Location Detail 1 APPENDAGE    Implantable Lead Special Function Length 52 cm    Implantable Lead Location Right Atrium    Implantable Lead  Medtronic    Implantable Lead Model 5076 CapSureFix Novus MRI SureScan    Implantable Lead Serial Number [id number]    Implantable Lead Implant Date 2019    Implantable Lead Polarity Type Bipolar Lead    Implantable Lead Location Detail 1 SEPTUM    Implantable Lead Special Function Length 58 cm    Implantable Lead Location Right Ventricle    Reyes Setting Mode (NBG Code) VVIR    Reyes Setting Lower Rate Limit 60    Reyes Setting Maximum Sensor Rate 130    Reyes  Setting Hysterisis Rate DISABLED    Lead Channel Setting Sensing Polarity Bipolar    Lead Channel Setting Sensing Anode Location Right Atrium    Lead Channel Setting Sensing Anode Terminal Ring    Lead Channel Setting Sensing Cathode Location Right Atrium    Lead Channel Setting Sensing Cathode Terminal Tip    Lead Channel Setting Sensing Sensitivity Off    Lead Channel Setting Sensing Polarity Bipolar    Lead Channel Setting Sensing Anode Location Right Ventricle    Lead Channel Setting Sensing Anode Terminal Ring    Lead Channel Setting Sensing Cathode Location Right Ventricle    Lead Channel Setting Sensing Cathode Terminal Tip    Lead Channel Setting Sensing Sensitivity 0.9    Lead Channel Setting Pacing Polarity Bipolar    Lead Channel Setting Pacing Anode Location Right Ventricle    Lead Channel Setting Pacing Anode Terminal Ring    Lead Channel Setting Sensing Cathode Location Right Ventricle    Lead Channel Setting Sensing Cathode Terminal Tip    Lead Channel Setting Pacing Pulse Width 0.4    Lead Channel Setting Pacing Amplitude 2    Lead Channel Setting Pacing Capture Mode Adaptive    Zone Setting Type Category VF    Zone Setting Type Category VT    Zone Setting Type Category VT    Zone Setting Type Category VT    Zone Setting Detection Interval 400    Zone Setting Type Category ATRIAL_FIBRILLATION    Zone Setting Type Category AT/AF    Lead Channel Impedance Value 437    Lead Channel Impedance Value 342    Lead Channel Impedance Value 437    Lead Channel Impedance Value 323    Lead Channel Sensing Intrinsic Amplitude 7    Lead Channel Pacing Threshold Amplitude 0.75    Lead Channel Pacing Threshold Pulse Width 0.4    Battery Date Time of Measurements 20221202071502    Battery Status OK    Battery RRT Trigger 2.625    Battery Remaining Longevity 134    Battery Voltage 3.02    Reyes Statistic Date Time Start 33430120411554    Reyes Statistic Date Time End 04482284160584    Reyes Statistic RA Percent Paced 0     Reyes Statistic RV Percent Paced 28.4    Reyes Statistic AP  Percent 0    Reyes Statistic AS  Percent 28.4    Reyes Statistic AP VS Percent 0    Reyes Statistic AS VS Percent 71.61    Episode Statistic Recent Count 0    Episode Statistic Type Category AT/AF    Episode Statistic Recent Count 12,646    Episode Statistic Type Category VT    Episode Statistic Recent Count 204    Episode Statistic Type Category VT    Episode Statistic Recent Date Time Start 54628017116365    Episode Statistic Recent Date Time End 90566695799890    Episode Statistic Recent Date Time Start 50641342874475    Episode Statistic Recent Date Time End 05626955186027    Episode Statistic Recent Date Time Start 29537127708011    Episode Statistic Recent Date Time End 25539541412179    Episode Statistic Total Count 3,691    Episode Statistic Type Category AT/AF    Episode Statistic Total Count 23,959    Episode Statistic Type Category VT    Episode Statistic Total Count 209    Episode Statistic Type Category VT    Episode Statistic Total Date Time Start 20191213163058    Episode Statistic Total Date Time End 16656806219390    Episode Statistic Total Date Time Start 20191213163058    Episode Statistic Total Date Time End 85763411297335    Episode Statistic Total Date Time Start 20191213163058    Episode Statistic Total Date Time End 14901750361049    Episode Identifier 27,973    Episode Type Category VT1_MONITOR    Episode Date Time 66873625795465    Episode Duration 28    Episode Identifier 27,067    Episode Type Category VT1_MONITOR    Episode Date Time 97616819741144    Episode Duration 89    Episode Identifier 26,826    Episode Type Category VT1_MONITOR    Episode Date Time 61115463683771    Episode Duration 23    Episode Identifier 26,818    Episode Type Category VT1_MONITOR    Episode Date Time 65210613167224    Episode Duration 61    Episode Identifier 26,809    Episode Type Category VT1_MONITOR    Episode Date Time 02983016905004     Episode Duration 11    Episode Identifier 26,784    Episode Type Category VT1_MONITOR    Episode Date Time 14986113882773    Episode Duration 13    Episode Identifier 26,752    Episode Type Category VT1_MONITOR    Episode Date Time 04149928311323    Episode Duration 13    Episode Identifier 26,750    Episode Type Category VT1_MONITOR    Episode Date Time 03794295721227    Episode Duration 28    Episode Identifier 26,748    Episode Type Category VT1_MONITOR    Episode Date Time 12646939026724    Episode Duration 17    Episode Identifier 26,746    Episode Type Category VT1_MONITOR    Episode Date Time 38913899601692    Episode Duration 32    Episode Identifier 26,742    Episode Type Category VT1_MONITOR    Episode Date Time 55526075045881    Episode Duration 28    Episode Identifier 26,741    Episode Type Category VT1_MONITOR    Episode Date Time 91199608465683    Episode Duration 47    Episode Identifier 26,731    Episode Type Category VT1_MONITOR    Episode Date Time 68397026685171    Episode Duration 32    Episode Identifier 26,729    Episode Type Category VT1_MONITOR    Episode Date Time 59302039172793    Episode Duration 72    Episode Identifier 26,724    Episode Type Category VT1_MONITOR    Episode Date Time 69002932574381    Episode Duration 30    Episode Identifier 26,715    Episode Type Category VT1_MONITOR    Episode Date Time [id number]    Episode Duration 22    Episode Identifier 26,709    Episode Type Category VT1_MONITOR    Episode Date Time [id number]    Episode Duration 18    Episode Identifier 26,708    Episode Type Category VT1_MONITOR    Episode Date Time [id number]    Episode Duration 40    Episode Identifier 26,706    Episode Type Category VT1_MONITOR    Episode Date Time [id number]    Episode Duration 11    Episode Identifier 26,667    Episode Type Category VT1_MONITOR    Episode Date Time [id number]    Episode Duration 21    Episode Identifier 26,660    Episode Type Category  VT1_MONITOR    Episode Date Time [id number]    Episode Duration 23    Episode Identifier 26,640    Episode Type Category VT1_MONITOR    Episode Date Time [id number]    Episode Duration 27    Episode Identifier 26,599    Episode Type Category VT1_MONITOR    Episode Date Time [id number]    Episode Duration 20    Episode Identifier 26,594    Episode Type Category VT1_MONITOR    Episode Date Time [id number]    Episode Duration 25    Episode Identifier 26,592    Episode Type Category VT1_MONITOR    Episode Date Time [id number]    Episode Duration 26    Episode Identifier 26,591    Episode Type Category VT1_MONITOR    Episode Date Time [id number]    Episode Duration 39    Episode Identifier 26,585    Episode Type Category VT1_MONITOR    Episode Date Time [id number]    Episode Duration 30    Episode Identifier 26,584    Episode Type Category VT1_MONITOR    Episode Date Time [id number]    Episode Duration 48    Episode Identifier 26,581    Episode Type Category VT1_MONITOR    Episode Date Time [id number]    Episode Duration 44    Episode Identifier 26,555    Episode Type Category VT1_MONITOR    Episode Date Time [id number]    Episode Duration 37    Episode Identifier 26,538    Episode Type Category VT1_MONITOR    Episode Date Time [id number]    Episode Duration 31    Episode Identifier 26,532    Episode Type Category VT1_MONITOR    Episode Date Time [id number]    Episode Duration 27    Episode Identifier 26,463    Episode Type Category VT1_MONITOR    Episode Date Time [id number]    Episode Duration 23    Episode Identifier 26,360    Episode Type Category VT1_MONITOR    Episode Date Time [id number]    Episode Duration 25    Episode Identifier 26,222    Episode Type Category VT1_MONITOR    Episode Date Time [id number]    Episode Duration 21    Episode Identifier 26,199    Episode Type Category VT1_MONITOR    Episode Date Time [id number]    Episode Duration 32     Episode Identifier 26,186    Episode Type Category VT1_MONITOR    Episode Date Time [id number]    Episode Duration 63    Episode Identifier 26,141    Episode Type Category VT1_MONITOR    Episode Date Time [id number]    Episode Duration 19    Episode Identifier 26,140    Episode Type Category VT1_MONITOR    Episode Date Time [id number]    Episode Duration 27    Episode Identifier 26,139    Episode Type Category VT1_MONITOR    Episode Date Time [id number]    Episode Duration 62    Episode Identifier 26,135    Episode Type Category VT1_MONITOR    Episode Date Time [id number]    Episode Duration 19    Episode Identifier 26,122    Episode Type Category VT1_MONITOR    Episode Date Time [id number]    Episode Duration 32    Episode Identifier 26,115    Episode Type Category VT1_MONITOR    Episode Date Time [id number]    Episode Duration 36    Episode Identifier 26,106    Episode Type Category VT1_MONITOR    Episode Date Time [id number]    Episode Duration 24    Episode Identifier 26,102    Episode Type Category VT1_MONITOR    Episode Date Time [id number]    Episode Duration 28    Episode Identifier 26,100    Episode Type Category VT1_MONITOR    Episode Date Time [id number]    Episode Duration 49    Episode Identifier 26,095    Episode Type Category VT1_MONITOR    Episode Date Time [id number]    Episode Duration 45    Episode Identifier 26,090    Episode Type Category VT1_MONITOR    Episode Date Time [id number]    Episode Duration 30    Episode Identifier 26,089    Episode Type Category VT1_MONITOR    Episode Date Time [id number]    Episode Duration 28    Episode Identifier 26,087    Episode Type Category VT1_MONITOR    Episode Date Time [id number]    Episode Duration 25    Episode Identifier 28,021    Episode Type Category VT    Episode Date Time [id number]    Episode Duration 1    Episode Identifier 28,020    Episode Type Category VT    Episode Date Time  [id number]    Episode Duration 1    Episode Identifier 28,019    Episode Type Category VT    Episode Date Time 20221201141958    Episode Duration 1    Episode Identifier 28,018    Episode Type Category VT    Episode Date Time 20221201141251    Episode Duration 1    Episode Identifier 28,017    Episode Type Category VT    Episode Date Time 20221201141247    Episode Duration 1    Episode Identifier 28,016    Episode Type Category VT    Episode Date Time 20221201141243    Episode Duration 1    Episode Identifier 28,015    Episode Type Category VT    Episode Date Time 20221201141205    Episode Duration 0    Episode Identifier 28,014    Episode Type Category VT    Episode Date Time 20221201141154    Episode Duration 1    Episode Identifier 28,013    Episode Type Category VT    Episode Date Time 20221201141118    Episode Duration 1    Episode Identifier 28,012    Episode Type Category VT    Episode Date Time 20221201141057    Episode Duration 1    Episode Identifier 28,011    Episode Type Category VT    Episode Date Time 20221201141054    Episode Duration 0    Episode Identifier 28,010    Episode Type Category VT    Episode Date Time 20221201141037    Episode Duration 2    Episode Identifier 28,009    Episode Type Category VT    Episode Date Time 20221201141026    Episode Duration 0    Episode Identifier 28,008    Episode Type Category VT    Episode Date Time 20221201140907    Episode Duration 0    Episode Identifier 28,007    Episode Type Category VT    Episode Date Time [id number]    Episode Duration 1    Narrative    Patient seen in [address] [place] [address] for evaluation and iterative programming of their   pacemaker per MD orders. Patient underwent PVC ablation yesterday with Dr. Eaton.    Device: Medtronic [id number] [place] XT DR GALLEGOS  Normal Device Function.   Mode: VVIR  bpm  : 28.4%  Intrinsic rhythm: AF w/ irregular VS @  bpm  Short V-V intervals: 0  Lead Trends Appear Stable: Yes  Estimated battery longevity  to RRT = 11 years. Battery voltage = 3.02 V.  Atrial arrhythmia: Chronic AF  AF burden: N/R  Anticoagulant: Eliquis  Ventricular Arrhythmia: 12,850 VT episodes recorded since 11/19/22,   lasting <1 second to 1.5 minutes at 154-222 bpm. Episode EGMs reveal some   episodes of irregular R-R intervals, suggestive of AF w/ RVR. Other   episodes are suggestive of VT and/or PVC runs.  PVC Runs = 247 per hour  PVC Singles = 718 per hour  Setting changes: None    Plan: Continue inpatient evaluation and treatment. Upon discharge, patient   is scheduled to send a remote transmission in 3 months.  HILARY Pacheco RN    Dual lead pacemaker    I have reviewed and interpreted the device interrogation, settings,   programming and nurse's summary. The device is functioning within normal   device parameters. I agree with the current findings, assessment and plan.     *Note: Due to a large number of results and/or encounters for the requested time period, some results have not been displayed. A complete set of results can be found in Results Review.       Discharge Medications   Current Discharge Medication List      CONTINUE these medications which have NOT CHANGED    Details   acetaminophen (TYLENOL) 500 MG tablet Take 500 mg by mouth every 6 hours as needed for mild pain      alendronate (FOSAMAX) 70 MG tablet TAKE 1 TABLET(70 MG) BY MOUTH EVERY 7 DAYS  Qty: 12 tablet, Refills: 3    Comments: ZERO refills remain on this prescription. Your patient is requesting advance approval of refills for this medication to PREVENT ANY MISSED DOSES  Associated Diagnoses: Steroid-induced osteoporosis      apixaban ANTICOAGULANT (ELIQUIS ANTICOAGULANT) 5 MG tablet Take 1 tablet (5 mg) by mouth 2 times daily  Qty: 180 tablet, Refills: 3    Associated Diagnoses: Paroxysmal atrial fibrillation (H)      atenolol (TENORMIN) 50 MG tablet Take 1 tablet (50 mg) by mouth 2 times daily  Qty: 60 tablet, Refills: 3    Associated Diagnoses: Atrial tachycardia  (H)      calcium carbonate 600 mg-vitamin D 400 units (CALTRATE) 600-400 MG-UNIT per tablet Take 2 tablets by mouth daily      digoxin (LANOXIN) 125 MCG tablet Take 1 tablet (125 mcg) by mouth daily  Qty: 90 tablet, Refills: 3    Associated Diagnoses: Atrial tachycardia (H)      furosemide (LASIX) 40 MG tablet Take 0.5 tablets (20 mg) by mouth daily  Qty: 90 tablet, Refills: 3    Associated Diagnoses: Chronic systolic heart failure (H)      gabapentin (NEURONTIN) 100 MG capsule Take 1 capsule AM + 1 capsule afternoon + 2 capsules at bedtime  Qty: 360 capsule, Refills: 1    Associated Diagnoses: Rheumatoid arthritis of both ankles, unspecified whether rheumatoid factor present (H)      hydroxychloroquine (PLAQUENIL) 200 MG tablet Take 1 tablet (200 mg) by mouth daily  Qty: 90 tablet, Refills: 3    Associated Diagnoses: Rheumatoid arthritis involving multiple sites with positive rheumatoid factor (H); Steroid-induced osteoporosis      loratadine (CLARITIN) 10 MG tablet Take 10 mg by mouth daily      magnesium oxide 200 MG TABS Take 200 mg by mouth daily bedtime      predniSONE (DELTASONE) 1 MG tablet Take 3 tablets (3 mg) by mouth daily  Qty: 270 tablet, Refills: 3    Associated Diagnoses: Rheumatoid arthritis involving multiple sites with positive rheumatoid factor (H)      spironolactone (ALDACTONE) 25 MG tablet Take 2 tablets (50 mg) by mouth daily  Qty: 90 tablet, Refills: 3    Associated Diagnoses: Acute on chronic diastolic heart failure (H)      multivitamin, therapeutic with minerals (THERA-VIT-M) TABS tablet Take 1 tablet by mouth daily  Qty: 30 each, Refills: 0    Associated Diagnoses: Diffuse large B-cell lymphoma, unspecified body region (H)      STATIN NOT PRESCRIBED (INTENTIONAL) Please choose reason not prescribed, below  Qty:      Associated Diagnoses: Cardiogenic shock (H)           Allergies   Allergies   Allergen Reactions     Amiodarone Other (See Comments) and Difficulty breathing     Lethargic  and had trouble breathing- occurred in 2017     Blood Transfusion Related (Informational Only) Hives     Hives with platelets. Give benadryl premedication.     Metoprolol Other (See Comments)     Pt and  report that metoprolol does not work for her and also reports feeling unwell with this medication. She has been able to tolerate atenolol, which as worked in controlling her HR.      Other [No Clinical Screening - See Comments]      Penicillin Allergy Skin Test not performed, see antimicrobial management team progress note 7/5/17.       Penicillins      Tape [Adhesive Tape] Rash

## 2022-12-05 NOTE — PLAN OF CARE
Occupational Therapy Discharge Summary    Reason for therapy discharge:    Discharged to home with outpatient therapy.    Progress towards therapy goal(s). See goals on Care Plan in [facility] electronic health record for goal details.  Goals partially met.  Barriers to achieving goals:   discharge from facility.    Therapy recommendation(s):    Continued therapy is recommended.  Rationale/Recommendations:  Recommending home with assist from family for heavier ADL/IADL tasks and OP CR to progress cardiovascular endurance.

## 2022-12-05 NOTE — PLAN OF CARE
Goal Outcome Evaluation:      Plan of Care Reviewed With: patient    Overall Patient Progress: improvingOverall Patient Progress: improving         Neuro/Pain/Sedation: A&Ox4. SAMPSON, follows commands. Denies pain.  Cardiac: Afib, rates 70-80 overnight. Decreased peripheral edema evident. No changes.  Respiratory: Stable on room air.  GI/: Adequate urine output overnight. Tolerating regular diet. No BM.  Activity: Up with SBA.  Skin: No new concerns.  LDA's: 2x PIV.     Plan: Echocardiogram this morning, then discharge.

## 2022-12-05 NOTE — TELEPHONE ENCOUNTER
Called Amelia after discharge to schedule follow up in clinic. Per discharging team needs follow up within one week. Lasix increased from 20 mg daily to 40 mg daily.   Discharge staff message:    1. HFpEF: We will appreciate a follow up visit with cbc and bmp check within one week as she will be going home on a significantly increased diuretic dose (increased from 20 mg daily to 40 mg daily) Of note, patient is interested in cardiomems and will need assistance in initiating this process. Discharge dry weight 109 lbs, Discharge medications: Empagliflozin 10mg daily, Spironolactone 50 mg daily, Furosemide 40 mg bid and Atenolol 50 mg bid     Left message for Amelia to see if she can come to clinic Friday for appointment. [employer] also sent. Pao Alston RN

## 2022-12-06 ENCOUNTER — PATIENT OUTREACH (OUTPATIENT)
Dept: ONCOLOGY | Facility: CLINIC | Age: 62
End: 2022-12-06

## 2022-12-06 ENCOUNTER — TELEPHONE (OUTPATIENT)
Dept: FAMILY MEDICINE | Facility: CLINIC | Age: 62
End: 2022-12-06

## 2022-12-06 ENCOUNTER — PATIENT OUTREACH (OUTPATIENT)
Dept: CARE COORDINATION | Facility: CLINIC | Age: 62
End: 2022-12-06

## 2022-12-06 LAB
PATH REPORT.COMMENTS IMP SPEC: NORMAL
PATH REPORT.COMMENTS IMP SPEC: NORMAL
PATH REPORT.FINAL DX SPEC: NORMAL
PATH REPORT.MICROSCOPIC SPEC OTHER STN: NORMAL
PATH REPORT.MICROSCOPIC SPEC OTHER STN: NORMAL
PATH REPORT.RELEVANT HX SPEC: NORMAL

## 2022-12-06 PROCEDURE — 99207 BLOOD MORPHOLOGY PATHOLOGIST REVIEW: CPT | Performed by: STUDENT IN AN ORGANIZED HEALTH CARE EDUCATION/TRAINING PROGRAM

## 2022-12-06 NOTE — PROGRESS NOTES
[facility]    Background: Transitional Care Management program identified per system criteria and reviewed by [facility] team for possible outreach.    Assessment: Upon chart review, CCR Team member will not proceed with patient outreach related to this episode of Transitional Care Management program due to reason below:    Patient has active communication with a nurse, provider or care team for reason of post-hospital follow up plan.  Outreach call by CCRC team not indicated to minimize duplicative efforts.     Plan: Transitional Care Management episode addressed appropriately per reason noted above.  Oncology care team is tasked to outreach to patient.     Estefany Marquez RN  [facility] [place], [facility]    *Connected Care Resource Team does NOT follow patient ongoing. Referrals are identified based on internal discharge reports and the outreach is to ensure patient has an understanding of their discharge instructions.

## 2022-12-06 NOTE — PROGRESS NOTES
Patient discharged on 12/5/22, please follow up per TCM workflow.    Arnav Rucker on 12/6/2022 at 8:55 AM

## 2022-12-06 NOTE — TELEPHONE ENCOUNTER
MTM referral from: Transitions of Care (recent hospital discharge or ED visit)    MTM referral outreach attempt on December 6, 2022 at 10:03 AM      Outcome: Patient is not interested at this time because she will pass for now as she has help from pharmacy and [facility] pharmacist, will route to [facility] Pharmacist/Provider as an FYI. Thank you for the referral.     Darío Duff, [facility]

## 2022-12-08 ENCOUNTER — OFFICE VISIT (OUTPATIENT)
Dept: CARDIOLOGY | Facility: CLINIC | Age: 62
End: 2022-12-08
Attending: NURSE PRACTITIONER
Payer: COMMERCIAL

## 2022-12-08 VITALS
WEIGHT: 109.8 LBS | HEIGHT: 58 IN | BODY MASS INDEX: 23.05 KG/M2 | SYSTOLIC BLOOD PRESSURE: 112 MMHG | HEART RATE: 86 BPM | OXYGEN SATURATION: 97 % | DIASTOLIC BLOOD PRESSURE: 78 MMHG

## 2022-12-08 DIAGNOSIS — I50.9 CONGESTIVE HEART FAILURE, UNSPECIFIED HF CHRONICITY, UNSPECIFIED HEART FAILURE TYPE (H): ICD-10-CM

## 2022-12-08 PROCEDURE — 99215 OFFICE O/P EST HI 40 MIN: CPT | Performed by: NURSE PRACTITIONER

## 2022-12-08 PROCEDURE — G0463 HOSPITAL OUTPT CLINIC VISIT: HCPCS

## 2022-12-08 ASSESSMENT — PAIN SCALES - GENERAL: PAINLEVEL: NO PAIN (0)

## 2022-12-08 NOTE — NURSING NOTE
Diet: Patient instructed regarding a heart failure healthy diet, including discussion of reduced fat and 2000 mg daily sodium restriction, daily weights, medication purpose and compliance, fluid restrictions and resources for patient and family to access for assistance with heart failure management.       Labs: Patient was given results of the laboratory testing obtained today and patient was instructed about when to return for the next laboratory testing.     Med Reconcile: Reviewed and verified all current medications with the patient. The updated medication list was printed and given to the patient.     Med changes: none    Return Appointment: Patient given instructions regarding scheduling next clinic visit: [facility] in 4 weeks, Dr Eaton 2/1.  Would like a cardio[employer]ated Ordering Systems implanted- message sent and Elmira Vasquez NP will check in with Dr Eaton    Patient stated she understood all health information given and agreed to call with further questions or concerns.     Kandy Molina RN

## 2022-12-08 NOTE — PROGRESS NOTES
[facility] Cardiology   [facility]      HPI:   Ms. 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 She was recently admitted 11/19/-12/5/22 with ADHF and frequent NSVT, now s/p VT ablation. [place] [facility] [place]     With regards to her cardiac conditions, as noted, Ms. Michel presented to [facility] on 11/19 with MONTALVO, BLE edema, and 8 pound weight gain over the course of one week. Chest X-ray was suspicious for pulmonary edema, NT-P BNP 1,913, Troponin T 21-->12. EDW per chart review 116-120; admission weight 128. She was admitted w/ decompensated diastolic heart failure with pictutre c/b frequent non sustained ventricular tachycardia with RHC on 11/28/22 notable for cardiogenic shock with a cardiac index of 1.2 and CO of 1.6. Etiology thought to most likely secondary to frequent non sustained ventricular tachycardia in the setting of possible restrictive/constrictive physiology given her malignancy history (albeit presumably in remission). CAD ruled on by coronary angiogram completed on 11/28/22. She underwent VT ablation on 12/1/22 and had successful resumption of her home medications with increased diuretic dosing and adddition of an SGLT2 inhibitor. She was diuresed 19 lbs to a weight of 109 lbs at time of discharge.     Today, Ms. Michel [place] [facility] [place] . Since discharge she reports feeling relatively well. She still feels weak and deconditioned, however she denies any worsening heart failure symptoms. She has baseline MONTALVO, stable. No chest discomfort, SOB, palpitations, lightheadedness, LE edema,  orthopnea, PND, syncope, or presyncope. Home weights stable at 106 lb. She wears compression stockings. At present Ms. Michel feels that that she is euvolemic. Remains on eliquis for CVA prophylaxis. Denies any bleeding episodes, although remains bruised from swan devaughn catheter.       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     VSD (ventricular septal defect)        FAMILY HISTORY:  Family History   Problem Relation Age of Onset     Breast Cancer Mother      Hypertension Mother      Alzheimer Disease Mother      Cerebrovascular Disease Mother      Hypertension Father      Cerebrovascular Disease Father      Atrial fibrillation Father      Lymphoma Father      Prostate Cancer Father      Diabetes Paternal Grandmother      Alzheimer Disease Maternal Grandmother         likely     Arthritis Maternal Grandfather      Pneumonia Maternal Grandfather        SOCIAL HISTORY:  Social History     Socioeconomic History     Marital status:      Spouse name: Romeo Michel     Number of children: 0   Occupational History     Employer: [employer]   Tobacco Use     Smoking status: Never     Smokeless tobacco: Never   Substance and Sexual Activity     Alcohol use: Not Currently     Comment: none     Drug use: No   Other Topics Concern     Parent/sibling w/ CABG, MI or angioplasty before 65F 55M? No     Social Determinants of Health     Intimate Partner Violence: Not At Risk     Fear of Current or Ex-Partner: No     Emotionally Abused: No     Physically Abused: No     Sexually Abused: No       CURRENT  MEDICATIONS:  acetaminophen (TYLENOL) 500 MG tablet, Take 500 mg by mouth every 6 hours as needed for mild pain  alendronate (FOSAMAX) 70 MG tablet, TAKE 1 TABLET(70 MG) BY MOUTH EVERY 7 DAYS  apixaban ANTICOAGULANT (ELIQUIS ANTICOAGULANT) 5 MG tablet, Take 1 tablet (5 mg) by mouth 2 times daily  atenolol (TENORMIN) 50 MG tablet, Take 1 tablet (50 mg) by mouth 2 times daily  calcium carbonate 600 mg-vitamin D 400 units (CALTRATE) 600-400 MG-UNIT per tablet, Take 2 tablets by mouth daily  empagliflozin (JARDIANCE) 10 MG TABS tablet, Take 1 tablet (10 mg) by mouth daily for 360 days  furosemide (LASIX) 40 MG tablet, Take 1 tablet (40 mg) by mouth 2 times daily for 240 days  gabapentin (NEURONTIN) 100 MG capsule, Take 1 capsule AM + 1 capsule afternoon + 2 capsules at bedtime  hydroxychloroquine (PLAQUENIL) 200 MG tablet, Take 1 tablet (200 mg) by mouth daily  loratadine (CLARITIN) 10 MG tablet, Take 10 mg by mouth daily  magnesium oxide 200 MG TABS, Take 200 mg by mouth daily bedtime  multivitamin, therapeutic with minerals (THERA-VIT-M) TABS tablet, Take 1 tablet by mouth daily  predniSONE (DELTASONE) 1 MG tablet, Take 3 tablets (3 mg) by mouth daily  spironolactone (ALDACTONE) 25 MG tablet, Take 2 tablets (50 mg) by mouth daily  STATIN NOT PRESCRIBED (INTENTIONAL), Please choose reason not prescribed, below (Patient not taking: Reported on 11/3/2022)    No current facility-administered medications on file prior to visit.      ROS:   Refer to HPI    EXAM:  There were no vitals taken for this visit.  GENERAL: Appears comfortable, in no acute distress.   HEENT: Eye symmetrical, no discharge or icterus bilaterally. Mucous membranes moist and without lesions.  CV: RRR, +S1S2, systolic murmur, no rub, or gallop. JVP at clavicle sitting upright .   RESPIRATORY: Respirations regular, even, and unlabored. Lungs CTA throughout.   GI: Soft and non distended with normoactive bowel sounds present in all quadrants. No tenderness,  rebound, guarding. No hepatomegaly.   EXTREMITIES: no peripheral edema. 2+ bilateral pedal pulses.   NEUROLOGIC: Alert and oriented x 3. No focal deficits.   MUSCULOSKELETAL: No joint swelling or tenderness.   SKIN: No jaundice. No rashes or lesions.     Labs, reviewed with patient in clinic today:  CBC RESULTS:  Lab Results   Component Value Date    WBC 4.7 12/05/2022    WBC Canceled, Test credited 03/16/2021    RBC 4.06 12/05/2022    RBC Canceled, Test credited 03/16/2021    HGB 14.4 12/05/2022    HGB Canceled, Test credited 03/16/2021    HCT 42.3 12/05/2022    HCT Canceled, Test credited 03/16/2021     (H) 12/05/2022    MCV Canceled, Test credited 03/16/2021    MCH 35.5 (H) 12/05/2022    MCH Canceled, Test credited 03/16/2021    MCHC 34.0 12/05/2022    MCHC Canceled, Test credited 03/16/2021    RDW 16.8 (H) 12/05/2022    RDW Canceled, Test credited 03/16/2021    PLT 63 (L) 12/05/2022    PLT Canceled, Test credited 03/16/2021       CMP RESULTS:  Lab Results   Component Value Date     (L) 12/05/2022     (L) 11/29/2022     06/23/2021    POTASSIUM 3.8 12/05/2022    POTASSIUM 4.7 11/19/2022    POTASSIUM 3.9 07/20/2022    POTASSIUM 4.2 06/23/2021    CHLORIDE 96 (L) 12/05/2022    CHLORIDE 101 07/20/2022    CHLORIDE 102 06/23/2021    CO2 18 (L) 12/05/2022    CO2 28 07/20/2022    CO2 28 06/23/2021    ANIONGAP 16 (H) 12/05/2022    ANIONGAP 7 07/20/2022    ANIONGAP 6 06/23/2021    GLC 70 12/05/2022    GLC 92 12/01/2022     (H) 07/20/2022    GLC 98 06/23/2021    BUN 32.9 (H) 12/05/2022    BUN 31 (H) 07/20/2022    BUN 25 06/23/2021    CR 1.08 12/05/2022    CR 1.05 (H) 06/23/2021    GFRESTIMATED 58 (L) 12/05/2022    GFRESTIMATED 57 (L) 06/23/2021    GFRESTBLACK 67 06/23/2021    VIKTORIYA 9.1 12/05/2022    VIKTORIYA 10.0 06/23/2021    BILITOTAL 1.9 (H) 12/05/2022    BILITOTAL 1.2 06/23/2021    ALBUMIN 3.6 12/05/2022    ALBUMIN 4.4 04/20/2022    ALBUMIN 4.2 06/23/2021    ALKPHOS 107 12/05/2022    ALKPHOS 136  06/23/2021    ALT 32 12/05/2022    ALT 41 06/23/2021    AST 55 (H) 12/05/2022    AST 36 06/23/2021        INR RESULTS:  Lab Results   Component Value Date    INR 4.57 (H) 11/19/2022    INR 3.42 (H) 03/07/2018       Lab Results   Component Value Date    MAG 2.0 12/05/2022    MAG 1.9 04/12/2018     Lab Results   Component Value Date    NTBNPI 1,913 (H) 11/19/2022    NTBNPI 485 12/12/2019     Lab Results   Component Value Date    NTBNP 1,425 (H) 10/13/2021    NTBNP 1,274 (H) 05/26/2021       Cardiac Diagnostics:    12/5/2022 ECHO  Interpretation Summary  Severe tricuspid insufficiency is present.  Moderate right ventricular dilation is present. Global right ventricular  function is mildly reduced.  Global and regional left ventricular function is normal with an EF of 55-60%.  IVC diameter >2.1 cm collapsing <50% with sniff suggests a high RA pressure  estimated at 15 mmHg or greater.  No pericardial effusion is present.  No significant changes noted.    11/28/2022 RHC with coronary angiogram     Right sided filling pressures are severely elevated.    Mild elevated pulmonary hypertension.    Left sided filling pressures are moderately elevated.    Reduced cardiac output level.    Hemodynamic data has been modified in Epic per physician review.    Prox LAD lesion is 30% stenosed.     Mild non-obstructive coronary artery disease        Assessment and Plan:   Ms. 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 She was recently admitted 11/19/-12/5/22 with ADHF and frequent NSVT, now s/p VT ablation. She [place] to [facility] [place] hospital follow up.      Since discharge Ms. Michel has been doing well. Weights remain stable and she is without any new or worsening HF symptoms. Appears euvolemic on exam. Review of labs at time of discharge show stable renal function with Cr 1.08 and thrombocytopenic with Plt 64 (trending up). Liver function improving following diuresis with t-Bili down to 1.9. Recommend repeat labs with PCP follow up in 1 week to monitor Plt count. Given recent hospitalization with need for aggressive diuresis, would recommend consideration of cardiomems vs coredella for monitoring of PA pressures and further optimization of diuresis and HFpEF management. Of note, Ms. Michel's  works for [employer]. They would prefer a cardiomems monitoring device. Will discuss with Dr. Eaton prior to proceeding.       # Acute on chronic diastolic heart failure with dilated and mildly reduced RV function (LVEF 55-60%)  Pre-Ablation: 11/28/22 RHC: CVP 24, PA 48/27/34, PCWP 23, SVO2 46, CI 1.1, CO 1.6  Post Ablation and Diuresis: 12/3/22 RHC: CVP 16, PA 41/22/28, PCWP 15, SVO2 69, CI 2.1, CO 3.0    NYHA Class III, AHA/ACC Stage C  Rate control: 86  volume status: euvolemic, lasix 40 mg BID,  lb  Blood pressure control: Atenolol to 50 mg po BID.   Aldosterone antagonist: Aldactone 50 mg po daily   SGLT2:  Empagliflozin 10 mg daily (initiated on most recent admission)  Evaluation of coronary arteries: 11/28/22 angiogram mild non-obstructive CAD     # Frequent, non-sustained ventricular tachycardia s/p VT Ablation (by Dr. Eaton on 12/1/22)  - Anti-arrhythmic: none, s/p VT ablation,   - Stopped PTA digoxin per EP recommendations  - Anticoagulation: given history of Afib, continue PTA Apixaban 5 mg bid  - Follow up with EP specialists (Dr. Eaton and/or Maria Esther Cutler NP)    # HTN Intolerant to Metoprolol in the past.   - Atenolol to 50 mg po BID.      # Aflutter. History of SSS s/p PPM 12/19. Long standing history of atrial arrythmias.  - Atenolol as above.   -  Continue Eliquis.   - Follow up with EP as scheduled.      # VSD s/p repair.     # History of exudative pericardial effusion.   - Stable per CT 3/19.     # Severe TR per TTE 11/2022  Secondary to non coaptation of tricuspid valves. Seen on BRE from 11/22/22 and remained severe on repeat TTE on 12/5/22 despite achieving clinical euvolemia and presumed dry weight. Patient was evaluated by surgery during this admission (Dr. Cope) who thought she may be a candidate for surgical intervention however, Ms. Michel would like to hold off on pursuing surgical intervention given improvement in her symptoms with plan to re-assess in the future.         Follow up:  - Dr. Eaton 2/1/23  - CORE in 4 weeks  - CORE with TTE 5/4/23      Chart review time today: 15 minutes  Visit time today: 30 minutes  Total time spent today: 45 minutes        Elmira Vasquez DNP, NP-C  Advance Heart Failure  12/08/22          AAKASH NAVA

## 2022-12-08 NOTE — PATIENT INSTRUCTIONS
Take your medicines every day, as directed    Changes made today:  No medication changes today     Monitor Your Weight and Symptoms    Contact us if you:    Gain 2 pounds in one day or 5 pounds in one week  Feel more short of breath  Notice more leg swelling  Feel lightheadeded   Change your lifestyle    Limit Salt or Sodium:  2000 mg  Limit Fluids:  2000 mL or approximately 64 ounces  Eat a Heart Healthy Diet  Low in saturated fats  Stay Active:  Aim to move at least 150 minutes every  week         To Contact us    During Business Hours:  [phone], option # 1      After hours, weekends or holidays:   [phone], Option #4  Ask to speak to the On-Call Cardiologist. Inform them you are a CORE/heart failure patient at the [facility].     Use [employer] allows you to communicate directly with your heart team through secure messaging.  [employer] can be accessed any time on your phone, computer, or tablet.  If you need assistance, we'd be happy to help!         Keep your Heart Appointments:    Dr. Eaton 2/1/22    CORE in 4 in weeks with labs     Will start process for CardioMEMs and be in touch

## 2022-12-08 NOTE — LETTER
12/8/2022      RE: Amelia Michel  [address]  [facility] [id number]       Dear Colleague,    Thank you for the opportunity to participate in the care of your patient, Amelia Michel, at the [facility] [place] [facility]. Please see a copy of my visit note below.      [facility] Cardiology   CORE Clinic      HPI:   Ms. 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 She was recently admitted 11/19/-12/5/22 with ADHF and frequent NSVT, now s/p VT ablation. She presents to [facility] for hospital follow up.     With regards to her cardiac conditions, as noted, Ms. Michel presented [facility] on 11/19 with MONTALVO, BLE edema, and 8 pound weight gain over the course of one week. Chest X-ray was suspicious for pulmonary edema, NT-P BNP 1,913, Troponin T 21-->12. EDW per chart review 116-120; admission weight 128. She was admitted w/ decompensated diastolic heart failure with pictutre c/b frequent non sustained ventricular tachycardia with RHC on 11/28/22 notable for cardiogenic shock with a cardiac index of 1.2 and CO of 1.6. Etiology thought to most likely secondary to frequent non sustained ventricular tachycardia in the setting of possible restrictive/constrictive physiology given her malignancy history (albeit presumably in remission). CAD ruled on by coronary angiogram completed on 11/28/22. She underwent VT ablation on 12/1/22 and had successful resumption of her home medications with increased diuretic dosing and adddition of an SGLT2 inhibitor. She was diuresed 19 lbs to a  weight of 109 lbs at time of discharge.     Today, Ms. Michel presents to [facility] with her . Since discharge she reports feeling relatively well. She still feels weak and deconditioned, however she denies any worsening heart failure symptoms. She has baseline MONTALVO, stable. No chest discomfort, SOB, palpitations, lightheadedness, LE edema, orthopnea, PND, syncope, or presyncope. Home weights stable at 106 lb. She wears compression stockings. At present Ms. Michel feels that that she is euvolemic. Remains on eliquis for CVA prophylaxis. Denies any bleeding episodes, although remains bruised from swan devaughn catheter.       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     VSD (ventricular septal defect)        FAMILY HISTORY:  Family History   Problem Relation Age of Onset     Breast Cancer Mother      Hypertension Mother      Alzheimer Disease Mother      Cerebrovascular Disease Mother      Hypertension Father      Cerebrovascular Disease Father      Atrial fibrillation Father      Lymphoma Father      Prostate Cancer Father      Diabetes Paternal Grandmother      Alzheimer Disease Maternal Grandmother         likely     Arthritis Maternal Grandfather      Pneumonia Maternal Grandfather        SOCIAL HISTORY:  Social History     Socioeconomic History     Marital status:      Spouse name: Romeo Michel     Number of children: 0   Occupational History     Employer: [employer]   Tobacco Use     Smoking status: Never     Smokeless tobacco: Never   Substance and Sexual Activity     Alcohol use:  Not Currently     Comment: none     Drug use: No   Other Topics Concern     Parent/sibling w/ CABG, MI or angioplasty before 65F 55M? No     Social Determinants of Health     Intimate Partner Violence: Not At Risk     Fear of Current or Ex-Partner: No     Emotionally Abused: No     Physically Abused: No     Sexually Abused: No       CURRENT MEDICATIONS:  acetaminophen (TYLENOL) 500 MG tablet, Take 500 mg by mouth every 6 hours as needed for mild pain  alendronate (FOSAMAX) 70 MG tablet, TAKE 1 TABLET(70 MG) BY MOUTH EVERY 7 DAYS  apixaban ANTICOAGULANT (ELIQUIS ANTICOAGULANT) 5 MG tablet, Take 1 tablet (5 mg) by mouth 2 times daily  atenolol (TENORMIN) 50 MG tablet, Take 1 tablet (50 mg) by mouth 2 times daily  calcium carbonate 600 mg-vitamin D 400 units (CALTRATE) 600-400 MG-UNIT per tablet, Take 2 tablets by mouth daily  empagliflozin (JARDIANCE) 10 MG TABS tablet, Take 1 tablet (10 mg) by mouth daily for 360 days  furosemide (LASIX) 40 MG tablet, Take 1 tablet (40 mg) by mouth 2 times daily for 240 days  gabapentin (NEURONTIN) 100 MG capsule, Take 1 capsule AM + 1 capsule afternoon + 2 capsules at bedtime  hydroxychloroquine (PLAQUENIL) 200 MG tablet, Take 1 tablet (200 mg) by mouth daily  loratadine (CLARITIN) 10 MG tablet, Take 10 mg by mouth daily  magnesium oxide 200 MG TABS, Take 200 mg by mouth daily bedtime  multivitamin, therapeutic with minerals (THERA-VIT-M) TABS tablet, Take 1 tablet by mouth daily  predniSONE (DELTASONE) 1 MG tablet, Take 3 tablets (3 mg) by mouth daily  spironolactone (ALDACTONE) 25 MG tablet, Take 2 tablets (50 mg) by mouth daily  STATIN NOT PRESCRIBED (INTENTIONAL), Please choose reason not prescribed, below (Patient not taking: Reported on 11/3/2022)    No current facility-administered medications on file prior to visit.      ROS:   Refer to HPI    EXAM:  There were no vitals taken for this visit.  GENERAL: Appears comfortable, in no acute distress.   HEENT: Eye symmetrical, no  discharge or icterus bilaterally. Mucous membranes moist and without lesions.  CV: RRR, +S1S2, systolic murmur, no rub, or gallop. JVP at clavicle sitting upright .   RESPIRATORY: Respirations regular, even, and unlabored. Lungs CTA throughout.   GI: Soft and non distended with normoactive bowel sounds present in all quadrants. No tenderness, rebound, guarding. No hepatomegaly.   EXTREMITIES: no peripheral edema. 2+ bilateral pedal pulses.   NEUROLOGIC: Alert and oriented x 3. No focal deficits.   MUSCULOSKELETAL: No joint swelling or tenderness.   SKIN: No jaundice. No rashes or lesions.     Labs, reviewed with patient in clinic today:  CBC RESULTS:  Lab Results   Component Value Date    WBC 4.7 12/05/2022    WBC Canceled, Test credited 03/16/2021    RBC 4.06 12/05/2022    RBC Canceled, Test credited 03/16/2021    HGB 14.4 12/05/2022    HGB Canceled, Test credited 03/16/2021    HCT 42.3 12/05/2022    HCT Canceled, Test credited 03/16/2021     (H) 12/05/2022    MCV Canceled, Test credited 03/16/2021    MCH 35.5 (H) 12/05/2022    MCH Canceled, Test credited 03/16/2021    MCHC 34.0 12/05/2022    MCHC Canceled, Test credited 03/16/2021    RDW 16.8 (H) 12/05/2022    RDW Canceled, Test credited 03/16/2021    PLT 63 (L) 12/05/2022    PLT Canceled, Test credited 03/16/2021       CMP RESULTS:  Lab Results   Component Value Date     (L) 12/05/2022     (L) 11/29/2022     06/23/2021    POTASSIUM 3.8 12/05/2022    POTASSIUM 4.7 11/19/2022    POTASSIUM 3.9 07/20/2022    POTASSIUM 4.2 06/23/2021    CHLORIDE 96 (L) 12/05/2022    CHLORIDE 101 07/20/2022    CHLORIDE 102 06/23/2021    CO2 18 (L) 12/05/2022    CO2 28 07/20/2022    CO2 28 06/23/2021    ANIONGAP 16 (H) 12/05/2022    ANIONGAP 7 07/20/2022    ANIONGAP 6 06/23/2021    GLC 70 12/05/2022    GLC 92 12/01/2022     (H) 07/20/2022    GLC 98 06/23/2021    BUN 32.9 (H) 12/05/2022    BUN 31 (H) 07/20/2022    BUN 25 06/23/2021    CR 1.08 12/05/2022     CR 1.05 (H) 06/23/2021    GFRESTIMATED 58 (L) 12/05/2022    GFRESTIMATED 57 (L) 06/23/2021    GFRESTBLACK 67 06/23/2021    VIKTORIYA 9.1 12/05/2022    VIKTORIYA 10.0 06/23/2021    BILITOTAL 1.9 (H) 12/05/2022    BILITOTAL 1.2 06/23/2021    ALBUMIN 3.6 12/05/2022    ALBUMIN 4.4 04/20/2022    ALBUMIN 4.2 06/23/2021    ALKPHOS 107 12/05/2022    ALKPHOS 136 06/23/2021    ALT 32 12/05/2022    ALT 41 06/23/2021    AST 55 (H) 12/05/2022    AST 36 06/23/2021        INR RESULTS:  Lab Results   Component Value Date    INR 4.57 (H) 11/19/2022    INR 3.42 (H) 03/07/2018       Lab Results   Component Value Date    MAG 2.0 12/05/2022    MAG 1.9 04/12/2018     Lab Results   Component Value Date    NTBNPI 1,913 (H) 11/19/2022    NTBNPI 485 12/12/2019     Lab Results   Component Value Date    NTBNP 1,425 (H) 10/13/2021    NTBNP 1,274 (H) 05/26/2021       Cardiac Diagnostics:    12/5/2022 ECHO  Interpretation Summary  Severe tricuspid insufficiency is present.  Moderate right ventricular dilation is present. Global right ventricular  function is mildly reduced.  Global and regional left ventricular function is normal with an EF of 55-60%.  IVC diameter >2.1 cm collapsing <50% with sniff suggests a high RA pressure  estimated at 15 mmHg or greater.  No pericardial effusion is present.  No significant changes noted.    11/28/2022 RHC with coronary angiogram     Right sided filling pressures are severely elevated.    Mild elevated pulmonary hypertension.    Left sided filling pressures are moderately elevated.    Reduced cardiac output level.    Hemodynamic data has been modified in Epic per physician review.    Prox LAD lesion is 30% stenosed.     Mild non-obstructive coronary artery disease        Assessment and Plan:   Ms. Michel is a 62 year old female with medical history pertinent for HFpEF, mildly dilated and reduced RV function, severe TR (2/2 failure of leaflet coaptation), atrial flutter/fibrillation (on apixaban and rate control  strategy), cardiac arrest (2017 likely 2/2 malignant involvement of the pericardium c/b organizing pericardial effusion), SSS s/p ppm (2019), VSD (s/p repair 1969), and DLBCL (s/p CHOP therapy and in remission). She was recently admitted 11/19/-12/5/22 with ADHF and frequent NSVT, now s/p VT ablation. She presents to [facility] for hospital follow up.     Since discharge Ms. Michel has been doing well. Weights remain stable and she is without any new or worsening HF symptoms. Appears euvolemic on exam. Review of labs at time of discharge show stable renal function with Cr 1.08 and thrombocytopenic with Plt 64 (trending up). Liver function improving following diuresis with t-Bili down to 1.9. Recommend repeat labs with PCP follow up in 1 week to monitor Plt count. Given recent hospitalization with need for aggressive diuresis, would recommend consideration of cardiomems vs coredella for monitoring of PA pressures and further optimization of diuresis and HFpEF management. Of note, Ms. Michel's  works for [employer]. They would prefer a cardiomems monitoring device. Will discuss with Dr. Eaton prior to proceeding.       # Acute on chronic diastolic heart failure with dilated and mildly reduced RV function (LVEF 55-60%)  Pre-Ablation: 11/28/22 RHC: CVP 24, PA 48/27/34, PCWP 23, SVO2 46, CI 1.1, CO 1.6  Post Ablation and Diuresis: 12/3/22 RHC: CVP 16, PA 41/22/28, PCWP 15, SVO2 69, CI 2.1, CO 3.0    NYHA Class III, AHA/ACC Stage C  Rate control: 86  volume status: euvolemic, lasix 40 mg BID,  lb  Blood pressure control: Atenolol to 50 mg po BID.   Aldosterone antagonist: Aldactone 50 mg po daily   SGLT2:  Empagliflozin 10 mg daily (initiated on most recent admission)  Evaluation of coronary arteries: 11/28/22 angiogram mild non-obstructive CAD     # Frequent, non-sustained ventricular tachycardia s/p VT Ablation (by Dr. Eaton on 12/1/22)  - Anti-arrhythmic: none, s/p VT ablation,   - Stopped PTA digoxin per EP  recommendations  - Anticoagulation: given history of Afib, continue PTA Apixaban 5 mg bid  - Follow up with EP specialists (Dr. Eaton and/or Maria Esther Cutler NP)    # HTN Intolerant to Metoprolol in the past.   - Atenolol to 50 mg po BID.      # Aflutter. History of SSS s/p PPM 12/19. Long standing history of atrial arrythmias.  - Atenolol as above.   - Continue Eliquis.   - Follow up with EP as scheduled.      # VSD s/p repair.     # History of exudative pericardial effusion.   - Stable per CT 3/19.     # Severe TR per TTE 11/2022  Secondary to non coaptation of tricuspid valves. Seen on BRE from 11/22/22 and remained severe on repeat TTE on 12/5/22 despite achieving clinical euvolemia and presumed dry weight. Patient was evaluated by surgery during this admission (Dr. Cope) who thought she may be a candidate for surgical intervention however, Ms. Michel would like to hold off on pursuing surgical intervention given improvement in her symptoms with plan to re-assess in the future.         Follow up:  - Dr. Eaton 2/1/23  - CORE in 4 weeks  - CORE with TTE 5/4/23      Chart review time today: 15 minutes  Visit time today: 30 minutes  Total time spent today: 45 minutes        Elmira Vasquez DNP, NP-C  Advance Heart Failure  12/08/22        AAKASH NAVA

## 2022-12-08 NOTE — NURSING NOTE
Chief Complaint   Patient presents with     Follow Up     Return cardiology           Vitals were taken and medications reconciled.     Reynold Shelley, EMT   2:17 PM

## 2022-12-09 ENCOUNTER — PATIENT OUTREACH (OUTPATIENT)
Dept: ONCOLOGY | Facility: CLINIC | Age: 62
End: 2022-12-09

## 2022-12-09 ENCOUNTER — TELEPHONE (OUTPATIENT)
Dept: CARDIOLOGY | Facility: CLINIC | Age: 62
End: 2022-12-09

## 2022-12-09 ENCOUNTER — DOCUMENTATION ONLY (OUTPATIENT)
Dept: OTHER | Facility: CLINIC | Age: 62
End: 2022-12-09

## 2022-12-09 NOTE — TELEPHONE ENCOUNTER
"Prior Authorization Request For CardioMEMS Has Been Submitted       Type (Commercial or Guide): Commercial    Requested By: Elmira Vasquez NP KEVIN    Primary MD: Juan Eaton MD    Date: 12/13/2022    Insurance: Medicare and [facility]    ICD Code: I50.9 Congestive heart failure, I50.33 Acute on chronic diastolic heart failure    Status  In process       Notes: Patient  works for [employer]. Patient has Medicare Part A and commercial insurance as well. [facility] choice plus program [employer].     December 22, 2022 - Received email that PA is still pending and [employer] will reach out to insurance again in a week.   January 3, 2023 - Requested update. - per email 1/9/2023 inquiry was made to insurance this week and PA was still pending   January 9, 2023 - Requested update - per email insurance contacted and asked for update probably will not get answer until this afternoon.   January 9, 2023  - Per e-mail received from [employer] - Case # [id number] - CardioMEMS is currently pending a voluntary pre-determination. There is no due date on this decision at this time, I ll call again in a few days. Thanks again, Barbara   January 23, 2023 - Was forwarded a [employer] message (below)  from patient. They received a denial. Wondering about next steps. Writer e-mailed Barbara at [employer] for more instructions.     \"We received a Claim Denial Letter from [email or web address], reference #[id number]\"  May 3, 2023 - Appeal sent in.        Jesus Chacon CMA  Heart Failure, Advanced Heart Failure & CORE  Referral Specialist &     [facility]  Cardiology  Office: [phone]  [phone]         "

## 2022-12-09 NOTE — PROGRESS NOTES
[facility]: Cancer Care Short Note                                    Discussion with Patient:                                                      RNCC called patient. Introduced self as RNCC working with Dr. Heredia's patients. Pt was at follow up cards appt and could not talk for long.   [facility] care team informed about admission- pt has a history of DLBCL. Requesting follow up in clinic for thrombocytopenia as seen in hospitalization. Pt is agreeable. Will have labs prior to visit as well.   Appts below:  Future Appointments   Date Time Provider Department Center   12/10/2022 11:15 AM [address]   12/19/2022  3:40 PM Gala Stringer PA-C [facility]   12/21/2022 10:15 AM [facility] LAB DRAW ONL [facility]   12/21/2022 10:45 AM Zuleima Lin APRN [facility]   1/9/2023 12:15 PM  LAB [facility]   1/9/2023 12:45 PM Elmira Vasquez APRN [facility]   1/18/2023  2:30 PM Christian Cameron MD [facility]   2/1/2023 10:30 AM  CV DEVICE 1 UCCVCV [facility]   2/1/2023 11:00 AM Juan Eaton MD [facility]   5/4/2023  9:00 AM  LAB [facility]   5/4/2023  9:30 AM [email or web address] CVCV [facility]   5/4/2023 10:30 AM Halle Vasquez APRN [facility]   6/5/2023 12:00 AM  ICD REMOTE UCCVSV [facility]   9/6/2023 12:00 AM  ICD REMOTE UCCVSV [facility]   9/21/2023 11:00 AM Maribell Domínguez MD [facility]           Intervention/Education provided during outreach:                                                         Patient to follow up as scheduled at next appt  Patient to call/MyChart message with updates    Patient verbalizes understanding of POC and has no other questions or concerns at this time.     Samia Calhoun, RN, MSN, OCN   RN Care Coordinator   [facility]   [address] [place] [id number]   [phone]

## 2022-12-10 ENCOUNTER — HOSPITAL ENCOUNTER (OUTPATIENT)
Dept: MAMMOGRAPHY | Facility: CLINIC | Age: 62
Discharge: HOME | End: 2022-12-10
Attending: PHYSICIAN ASSISTANT | Admitting: PHYSICIAN ASSISTANT
Payer: COMMERCIAL

## 2022-12-10 DIAGNOSIS — Z12.31 VISIT FOR SCREENING MAMMOGRAM: ICD-10-CM

## 2022-12-10 PROCEDURE — 77067 SCR MAMMO BI INCL CAD: CPT

## 2022-12-13 ENCOUNTER — MYC MEDICAL ADVICE (OUTPATIENT)
Dept: CARDIOLOGY | Facility: CLINIC | Age: 62
End: 2022-12-13

## 2022-12-13 DIAGNOSIS — I47.19 ATRIAL TACHYCARDIA (H): ICD-10-CM

## 2022-12-14 RX ORDER — ATENOLOL 50 MG/1
50 TABLET ORAL 2 TIMES DAILY
Qty: 180 TABLET | Refills: 3 | Status: ON HOLD | OUTPATIENT
Start: 2022-12-14 | End: 2023-01-29

## 2022-12-19 ENCOUNTER — OFFICE VISIT (OUTPATIENT)
Dept: FAMILY MEDICINE | Facility: CLINIC | Age: 62
End: 2022-12-19
Payer: COMMERCIAL

## 2022-12-19 VITALS
HEIGHT: 58 IN | TEMPERATURE: 97.7 F | DIASTOLIC BLOOD PRESSURE: 85 MMHG | SYSTOLIC BLOOD PRESSURE: 114 MMHG | WEIGHT: 109 LBS | RESPIRATION RATE: 17 BRPM | OXYGEN SATURATION: 98 % | HEART RATE: 108 BPM | BODY MASS INDEX: 22.88 KG/M2

## 2022-12-19 DIAGNOSIS — I07.1 SEVERE TRICUSPID REGURGITATION: ICD-10-CM

## 2022-12-19 DIAGNOSIS — D69.6 THROMBOCYTOPENIA, UNSPECIFIED (H): ICD-10-CM

## 2022-12-19 DIAGNOSIS — I50.9 CHRONIC CONGESTIVE HEART FAILURE, UNSPECIFIED HEART FAILURE TYPE (H): ICD-10-CM

## 2022-12-19 DIAGNOSIS — R57.0 CARDIOGENIC SHOCK (H): ICD-10-CM

## 2022-12-19 DIAGNOSIS — T38.0X5A STEROID-INDUCED OSTEOPOROSIS: ICD-10-CM

## 2022-12-19 DIAGNOSIS — I50.33 ACUTE ON CHRONIC DIASTOLIC HEART FAILURE (H): ICD-10-CM

## 2022-12-19 DIAGNOSIS — M25.511 ACUTE PAIN OF RIGHT SHOULDER: ICD-10-CM

## 2022-12-19 DIAGNOSIS — Z13.220 SCREENING FOR HYPERLIPIDEMIA: ICD-10-CM

## 2022-12-19 DIAGNOSIS — N18.30 STAGE 3 CHRONIC KIDNEY DISEASE, UNSPECIFIED WHETHER STAGE 3A OR 3B CKD (H): ICD-10-CM

## 2022-12-19 DIAGNOSIS — Z00.00 ENCOUNTER FOR ROUTINE ADULT HEALTH EXAMINATION WITHOUT ABNORMAL FINDINGS: Primary | ICD-10-CM

## 2022-12-19 DIAGNOSIS — C83.30 DIFFUSE LARGE B-CELL LYMPHOMA, UNSPECIFIED BODY REGION (H): Chronic | ICD-10-CM

## 2022-12-19 DIAGNOSIS — M81.8 STEROID-INDUCED OSTEOPOROSIS: ICD-10-CM

## 2022-12-19 DIAGNOSIS — M06.9 RHEUMATOID ARTHRITIS OF BOTH ANKLES, UNSPECIFIED WHETHER RHEUMATOID FACTOR PRESENT (H): Chronic | ICD-10-CM

## 2022-12-19 DIAGNOSIS — I48.0 PAROXYSMAL ATRIAL FIBRILLATION (H): Chronic | ICD-10-CM

## 2022-12-19 DIAGNOSIS — I89.0 LYMPHEDEMA OF BOTH LOWER EXTREMITIES: ICD-10-CM

## 2022-12-19 DIAGNOSIS — H90.3 BILATERAL SENSORINEURAL HEARING LOSS: ICD-10-CM

## 2022-12-19 PROCEDURE — G0438 PPPS, INITIAL VISIT: HCPCS | Performed by: PHYSICIAN ASSISTANT

## 2022-12-19 PROCEDURE — 99214 OFFICE O/P EST MOD 30 MIN: CPT | Mod: 25 | Performed by: PHYSICIAN ASSISTANT

## 2022-12-19 PROCEDURE — 90471 IMMUNIZATION ADMIN: CPT | Performed by: PHYSICIAN ASSISTANT

## 2022-12-19 PROCEDURE — 90732 PPSV23 VACC 2 YRS+ SUBQ/IM: CPT | Performed by: PHYSICIAN ASSISTANT

## 2022-12-19 RX ORDER — GABAPENTIN 100 MG/1
CAPSULE ORAL
Qty: 360 CAPSULE | Refills: 1 | Status: SHIPPED | OUTPATIENT
Start: 2022-12-19 | End: 2023-03-27

## 2022-12-19 ASSESSMENT — ENCOUNTER SYMPTOMS
HEARTBURN: 0
HEMATOCHEZIA: 0
NERVOUS/ANXIOUS: 1
WEAKNESS: 1
HEADACHES: 1
DIARRHEA: 0
DYSURIA: 0
SHORTNESS OF BREATH: 0
CHILLS: 0
SORE THROAT: 0
CONSTIPATION: 0
ABDOMINAL PAIN: 0
PALPITATIONS: 0
NAUSEA: 0
MYALGIAS: 1
HEMATURIA: 0
FEVER: 0
BREAST MASS: 0
JOINT SWELLING: 1
DIZZINESS: 0
PARESTHESIAS: 1
COUGH: 0
FREQUENCY: 1
ARTHRALGIAS: 1
EYE PAIN: 0

## 2022-12-19 ASSESSMENT — PATIENT HEALTH QUESTIONNAIRE - PHQ9
SUM OF ALL RESPONSES TO PHQ QUESTIONS 1-9: 14
10. IF YOU CHECKED OFF ANY PROBLEMS, HOW DIFFICULT HAVE THESE PROBLEMS MADE IT FOR YOU TO DO YOUR WORK, TAKE CARE OF THINGS AT HOME, OR GET ALONG WITH OTHER PEOPLE: SOMEWHAT DIFFICULT
SUM OF ALL RESPONSES TO PHQ QUESTIONS 1-9: 14

## 2022-12-19 ASSESSMENT — ACTIVITIES OF DAILY LIVING (ADL): CURRENT_FUNCTION: PREPARING MEALS REQUIRES ASSISTANCE

## 2022-12-19 NOTE — PROGRESS NOTES
"SUBJECTIVE:   Amelia is a 62 year old who presents for Preventive Visit.  Patient has been advised of split billing requirements and indicates understanding: Yes  Are you in the first 12 months of your Medicare coverage?  No    Healthy Habits:     In general, how would you rate your overall health?  Fair    Frequency of exercise:  1 day/week    Duration of exercise:  Less than 15 minutes    Do you usually eat at least 4 servings of fruit and vegetables a day, include whole grains    & fiber and avoid regularly eating high fat or \"junk\" foods?  No    Taking medications regularly:  Yes    Medication side effects:  None    Ability to successfully perform activities of daily living:  Preparing meals requires assistance    Home Safety:  No safety concerns identified    Hearing Impairment:  Need to ask people to speak up or repeat themselves    In the past 6 months, have you been bothered by leaking of urine? Yes    In general, how would you rate your overall mental or emotional health?  Fair      PHQ-2 Total Score: 4    Additional concerns today:  Yes  Answers for HPI/ROS submitted by the patient on 12/19/2022  If you checked off any problems, how difficult have these problems made it for you to do your work, take care of things at home, or get along with other people?: Somewhat difficult  PHQ9 TOTAL SCORE: 14  Blood in stool: No  heartburn: No  peripheral edema: No  mood changes: Yes  Skin sensation changes: Yes  pelvic pain: No  vaginal bleeding: No  vaginal discharge: No  tenderness: No  breast mass: No  breast discharge: No  impotence: No  penile discharge: No    Have you ever done Advance Care Planning? (For example, a Health Directive, POLST, or a discussion with a medical provider or your loved ones about your wishes): Yes, advance care planning is on file.       Fall risk  Fallen 2 or more times in the past year?: No  Any fall with injury in the past year?: No    Cognitive Screening   1) Repeat 3 items (Leader, " Season, Table)    2) Clock draw: NORMAL  3) 3 item recall: Recalls 3 objects  Results: 3 items recalled: COGNITIVE IMPAIRMENT LESS LIKELY    Mini-CogTM Copyright S Buck. Licensed by the author for use in [facility]; reprinted with permission (sophy@[facility]). All rights reserved.      Do you have sleep apnea, excessive snoring or daytime drowsiness?: no    Reviewed and updated as needed this visit by clinical staff   Tobacco  Allergies  Meds  Problems  Med Hx  Surg Hx  Fam Hx          Reviewed and updated as needed this visit by Provider   Tobacco  Allergies  Meds  Problems  Med Hx  Surg Hx  Fam Hx         Social History     Tobacco Use     Smoking status: Never     Smokeless tobacco: Never   Substance Use Topics     Alcohol use: Not Currently     Comment: none     If you drink alcohol do you typically have >3 drinks per day or >7 drinks per week? No    Alcohol Use 12/19/2022   Prescreen: >3 drinks/day or >7 drinks/week? No   Prescreen: >3 drinks/day or >7 drinks/week? -   No flowsheet data found.    Hospital Follow-up Visit:    Hospital/Nursing Home/IP Rehab Facility: [facility]  Date of Admission: 11/19/22  Date of Discharge: 12/5/22  Reason(s) for Admission: heart failure, cardiogenic shock    Was your hospitalization related to COVID-19? No   Problems taking medications regularly:  None  Medication changes since discharge: increased lasix. jardiance.  Problems adhering to non-medication therapy:  None    Summary of hospitalization:  [facility] hospital discharge summary reviewed  Diagnostic Tests/Treatments reviewed.  Follow up needed: with specialists  Other Healthcare Providers Involved in Patient s Care:         Specialist appointment - cardiology  Update since discharge: improved. Plan of care communicated with patient     HR low 100s at home. Did increase atenolol dose. Sees cardiology next week.  20 lb fluid removed. No SOB.  Not feeling arrhythma.    * insomnia: chronic, but not as bad at it has been  Has tried: ativan, ambien, others. Feels  hungover with these meds  doxylamine, felt groggy even 1/2 tablet. Trazodone this summer, low dose made her nauseous    * feels down because of all her health issues  Tried zoloft two weeks, stopped as no improvement    * in the hospital hurt her shoulder transferring herself    Current providers sharing in care for this patient include:   Patient Care Team:  Gala Stringer PA-C as PCP - General (Physician Assistant)  Archana Green PA-C as Physician Assistant (Physician Assistant)  Stacie Delgado, RN as Registered Nurse (Clinical Cardiac Electrophysiology)  Kevin Sheldon MD as MD (Plastic Surgery)  Stacie Delgado, RN as Specialty Care Coordinator (Cardiology)  Eugene Hernandez, ABBEY (Cardiology)  Maribell Domínguez MD as MD (Rheumatology)  Dima Shrestha MD as MD (Endocrinology, Diabetes, and Metabolism)  Samia Calhoun RN as Specialty Care Coordinator (Hematology & Oncology)  Thai Heredia MD as MD (Hematology & Oncology)  Maribell Domínguez MD as Assigned Rheumatology Provider  Gala Stringer PA-C as Assigned PCP  Dima Shrestha MD as Assigned Endocrinology Provider  Thai Heredia MD as Assigned Cancer Care Provider  Cammy Soares, RN as Specialty Care Coordinator (Cardiology)  Juan Eaton MD as Assigned Heart and Vascular Provider  Christian Cameron MD as MD (Dermatology)  Zuleima Lin APRN CNP as Assigned Surgical Provider  Kandy Molina RN as Specialty Care Coordinator (Cardiology)    The following health maintenance items are reviewed in Epic and correct as of today:  Health Maintenance   Topic Date Due     EYE EXAM  10/31/2020     LIPID  10/15/2022     BMP  06/05/2023     MICROALBUMIN  07/06/2023     DIGOXIN  12/02/2023     ALT  12/05/2023     CBC  12/05/2023     HEMOGLOBIN  12/05/2023     MAMMO SCREENING  12/10/2023     MEDICARE  ANNUAL WELLNESS VISIT  12/19/2023     ANNUAL REVIEW OF HM ORDERS  12/19/2023     HF ACTION PLAN  10/15/2024     HPV TEST  10/15/2024     PAP  10/15/2024     ADVANCE CARE PLANNING  12/19/2027     Pneumococcal Vaccine: Pediatrics (0 to 5 Years) and At-Risk Patients (6 to 64 Years) (4 - PPSV23 if available, else PCV20) 12/19/2027     COLORECTAL CANCER SCREENING  11/19/2029     DTAP/TDAP/TD IMMUNIZATION (3 - Td or Tdap) 10/15/2031     TSH W/FREE T4 REFLEX  Completed     HEPATITIS C SCREENING  Completed     HIV SCREENING  Completed     PHQ-2 (once per calendar year)  Completed     INFLUENZA VACCINE  Completed     URINALYSIS  Completed     ZOSTER IMMUNIZATION  Completed     COVID-19 Vaccine  Completed     IPV IMMUNIZATION  Aged Out     MENINGITIS IMMUNIZATION  Aged Out     Lab work is in process  Labs reviewed in EPIC  BP Readings from Last 3 Encounters:   12/19/22 114/85   12/08/22 112/78   12/05/22 114/65    Wt Readings from Last 3 Encounters:   12/19/22 49.4 kg (109 lb)   12/08/22 49.8 kg (109 lb 12.8 oz)   12/05/22 49.6 kg (109 lb 5.6 oz)                  Patient Active Problem List   Diagnosis     HTN (hypertension)     Primary pulmonary hypertension (H)     Hyperlipidemia LDL goal <130     RA (rheumatoid arthritis) (H)     Lymphedema of both lower extremities     Atrial tachycardia (H)     Cardiogenic shock (H)     Critical illness myopathy     Diffuse large B-cell lymphoma of extranodal site (H)     Diffuse large B cell lymphoma (H)     Febrile neutropenia (H)     Physical deconditioning     DLBCL (diffuse large B cell lymphoma) (H)     H/O cardiac arrest     Paroxysmal atrial fibrillation (H)     Atrial flutter, unspecified type (H)     Cervical cancer screening     Mild protein-calorie malnutrition (H)     Embolism and thrombosis of unspecified artery (H)     Thrombocytopenia, unspecified (H)     Heart palpitations     Bradycardia     CHF (congestive heart failure) (H)     Chronic kidney disease, stage 3      Iatrogenic cushingoid features (H)     Steroid-induced osteoporosis     Hyponatremia     Acute on chronic diastolic heart failure (H)     Severe tricuspid regurgitation     Past Surgical History:   Procedure Laterality Date     ANESTHESIA CARDIOVERSION N/A 5/17/2017    Procedure: ANESTHESIA CARDIOVERSION;  ANESTHESIA CARDIOVERSION;  Surgeon: GENERIC ANESTHESIA PROVIDER;  Location: [facility] OR     ANESTHESIA CARDIOVERSION  3/12/2018    Procedure: ANESTHESIA CARDIOVERSION;;  Surgeon: GENERIC ANESTHESIA PROVIDER;  Location: [facility]     ANESTHESIA CARDIOVERSION N/A 3/23/2018    Procedure: ANESTHESIA CARDIOVERSION;  Anesthesia Cardioversion ;  Surgeon: GENERIC ANESTHESIA PROVIDER;  Location: [facility]     ANESTHESIA CARDIOVERSION N/A 4/12/2018    Procedure: ANESTHESIA CARDIOVERSION;  Cardioversion;  Surgeon: GENERIC ANESTHESIA PROVIDER;  Location: [facility] OR     ARTHRODESIS WRIST  2000    Right wrist     BONE MARROW ASPIRATE &BIOPSY  7/12/2017          BONE MARROW BIOPSY W/ ASPIRATION  07/12/2017     CARDIOVERSION      5/17/17, 3/12/18, 3/23/18, 4/14/18,      COLONOSCOPY N/A 11/19/2019    Procedure: Colonoscopy, With Polypectomy And Biopsy;  Surgeon: Pj Vasques MD;  Location: [facility]     CV CORONARY ANGIOGRAM N/A 11/28/2022    Procedure: Coronary Angiogram;  Surgeon: Sundar Wood MD;  Location:  HEART CARDIAC CATH LAB     CV RIGHT HEART CATH MEASUREMENTS RECORDED N/A 11/28/2022    Procedure: Right Heart Catheterization;  Surgeon: Sundar Wood MD;  Location:  HEART CARDIAC CATH LAB     EP ABLATION PVC N/A 12/1/2022    Procedure: Ablation Premature Ventricular Contractions;  Surgeon: Juan Eaton MD;  Location:  HEART CARDIAC CATH LAB     EP PACEMAKER N/A 12/13/2019    Procedure: EP Pacemaker;  Surgeon: Pj Trent MD;  Location: [facility] CARDIAC CATH LAB     EXCISE LESION UPPER EXTREMITY Left 5/22/2018    Procedure: EXCISE LESION UPPER EXTREMITY;  Left Arm Wound Excision And Closure, Possible  Submuscular Transposition of Ulnar Nerve  (Choice Anesthesia);  Surgeon: Kevin Sheldon MD;  Location: [facility]     FOOT SURGERY      4 left and 2 right     FOOT SURGERY      4 Left and 2 Right      IMPLANT PACEMAKER  12/13/2019    Dr [facility]      IMPLANT PACEMAKER       RELEASE CARPAL TUNNEL Bilateral      RELEASE CARPAL TUNNEL BILATERAL       REPAIR VENTRICULAR SEPTAL DEFECT  1969     TRANSPOSITION ULNAR NERVE (ELBOW) Left 5/22/2018    Procedure: TRANSPOSITION ULNAR NERVE (ELBOW);;  Surgeon: Kevin Sheldon MD;  Location: [facility]     ULNAR NERVE TRANSPOSITION Left 05/22/2018     VSD REPAIR  1969     ZZC FUSION FOOT BONES,SUBTALAR Right 10/20/2020    Procedure: RIGHT SUBTALAR JOINT FUSION AND CALCANEOCUBOID FUSION;  Surgeon: Nemesio Crow MD;  Location: [facility];  Service: Orthopedics       Social History     Tobacco Use     Smoking status: Never     Smokeless tobacco: Never   Substance Use Topics     Alcohol use: Not Currently     Comment: none     Family History   Problem Relation Age of Onset     Breast Cancer Mother         60s     Hypertension Mother      Alzheimer Disease Mother      Cerebrovascular Disease Mother      Hypertension Father      Cerebrovascular Disease Father      Atrial fibrillation Father      Lymphoma Father      Prostate Cancer Father      Alzheimer Disease Maternal Grandmother         likely     Arthritis Maternal Grandfather      Pneumonia Maternal Grandfather      Diabetes Paternal Grandmother          Pneumonia Vaccine:For adults with an immunocompromising condition, cerebrospinal fluid leak, or cochlear implant, administer 1 dose of PCV13 first then give 1 dose of PPSV23 at least 1 year later. Anyone who received any doses of PPSV23 before age 65 should receive 1 final dose of the vaccine at age 65 or older. Administer this last dose at least 5 years after the prior PPSV23 dose.  Mammogram Screening: Mammogram Screening: Recommended mammography  "every 1-2 years with patient discussion and risk factor consideration  History of abnormal Pap smear:   Last 3 Pap Results:   PAP (no units)   Date Value   10/04/2018 NIL   01/26/2015 NIL   02/08/2011 NIL       FHS-7:   Breast CA Risk Assessment (FHS-7) 11/24/2021 12/10/2022   Did any of your first-degree relatives have breast or ovarian cancer? Yes Yes   Did any of your relatives have bilateral breast cancer? No No   Did any man in your family have breast cancer? No No   Did any woman in your family have breast and ovarian cancer? No No   Did any woman in your family have breast cancer before age 50 y? No No   Do you have 2 or more relatives with breast and/or ovarian cancer? No No   Do you have 2 or more relatives with breast and/or bowel cancer? No No       Pertinent mammograms are reviewed under the imaging tab.    Review of Systems   Constitutional: Negative for chills and fever.   HENT: Positive for congestion and hearing loss. Negative for ear pain and sore throat.    Eyes: Negative for pain and visual disturbance.   Respiratory: Negative for cough and shortness of breath.    Cardiovascular: Negative for chest pain and palpitations.   Gastrointestinal: Negative for abdominal pain, constipation, diarrhea and nausea.   Genitourinary: Positive for frequency and urgency. Negative for dysuria, genital sores and hematuria.   Musculoskeletal: Positive for arthralgias, joint swelling and myalgias.   Skin: Negative for rash.   Neurological: Positive for weakness and headaches. Negative for dizziness.   Psychiatric/Behavioral: The patient is nervous/anxious.        OBJECTIVE:   /85   Pulse 108   Temp 97.7  F (36.5  C) (Tympanic)   Resp 17   Ht 1.469 m (4' 9.84\")   Wt 49.4 kg (109 lb)   SpO2 98%   BMI 22.91 kg/m   Estimated body mass index is 22.91 kg/m  as calculated from the following:    Height as of this encounter: 1.469 m (4' 9.84\").    Weight as of this encounter: 49.4 kg (109 lb).  Physical " Exam  GENERAL: healthy, alert and no distress  EYES: Eyes grossly normal to inspection, PERRL and conjunctivae and sclerae normal  HENT: ear canals and TM's normal, nose and mouth without ulcers or lesions  NECK: no adenopathy, no asymmetry, masses, or scars and thyroid normal to palpation  RESP: lungs clear to auscultation - no rales, rhonchi or wheezes  CV: regular rate and rhythm, normal S1 S2, no S3 or S4, no murmur, click or rub, no peripheral edema and peripheral pulses strong  ABDOMEN: soft, nontender, no hepatosplenomegaly, no masses and bowel sounds normal  MS: no gross musculoskeletal defects noted, no edema  SKIN: no suspicious lesions or rashes  NEURO: Normal strength and tone, mentation intact and speech normal  BACK: no CVA tenderness, no paralumbar tenderness  PSYCH: mentation appears normal, affect normal/bright  LYMPH: no cervical, supraclavicular, axillary, or inguinal adenopathy  ORTHO:   SHOULDER Exam-Right   Inspection: no swelling, no bruising, no discoloration, no obvious deformity, no asymmetry, no glenohumeral joint anterior bulge, no distal clavicle elevation, no muscle atrophy, no scapular winging   Tenderness of: anterior shoulder tenderness   Range of Motion: Active- limited due to pain.   Strength: weakness with forward flexion, internal rotation - and pain   Special tests: Neers- POSITIVE and Kirby(supraspinatous)- POSITIVE            Diagnostic Test Results:  Labs reviewed in Epic    ASSESSMENT / PLAN:     ASSESSMENT/PLAN:      ICD-10-CM    1. Encounter for routine adult health examination without abnormal findings  Z00.00       2. Screening for hyperlipidemia  Z13.220       3. Chronic congestive heart failure, unspecified heart failure type (H)  I50.9 Lipid panel reflex to direct LDL Non-fasting      4. Rheumatoid arthritis of both ankles, unspecified whether rheumatoid factor present (H)  M06.9 gabapentin (NEURONTIN) 100 MG capsule      5. Acute pain of right shoulder  M25.511  Physical Therapy Referral      6. Bilateral sensorineural hearing loss  H90.3 Adult Audiology [facility] Referral      7. Thrombocytopenia, unspecified (H)  D69.6       8. Paroxysmal atrial fibrillation (H)  I48.0       9. Diffuse large B-cell lymphoma, unspecified body region (H)  C83.30       10. Cardiogenic shock (H)  R57.0       11. Acute on chronic diastolic heart failure (H)  I50.33       12. Stage 3 chronic kidney disease, unspecified whether stage 3a or 3b CKD (H)  N18.30       13. Lymphedema of both lower extremities  I89.0       14. Steroid-induced osteoporosis  M81.8     T38.0X5A       15. Severe tricuspid regurgitation  I07.1         Recent hospitalization for acute on chronic heart failure, cardiogenic shock, severe tricuspid regurgitation.  - atrial tachycardia/a flutter/fib/CHF/htn: followed by cardiology, appointment next week. Likely will need valve replacement.  - hearing loss: patient notes with certain people's voices but not bothersome, declines hearing eval/test today.  - lymphoma/thrombocytopenia: Seeing oncology this week to follow up worsening thrombocytopenia.  - insomnia/depression: worsened due to health conditions. She has availability of counseling through 's work and recently it helped him. She will pursue that. She did not find zoloft helpful, could give it more time in the future or consider other medication but she does not want to try medication at this time. Has not tolerated multiple sleep medications.  - shoulder pain: offered x-ray, ortho, physical therapy - patient would like to start with physical therapy. Suspicious for rotator cuff.    Patient's other chronic conditions are stable.  - RA: follows with rheumatology  - has eye appointment Eric 2  - lymphedema: compression  - B cell lymphoma/thrombocytopenia/neutropenia: monitored by oncology  - prednisone induced osteoporosis: follows with endocrine  - prediabetes: on frequent/daily prednisone.   Was recently placed on  Jardiance for heart failure for 1 year then revisit. Will recheck A1C in future.      MED REC REQUIRED  Post Medication Reconciliation Status: discharge medications reconciled, continue medications without change    COUNSELING:  Reviewed preventive health counseling, as reflected in patient instructions       Immunizations    Vaccinated for: Pneumococcal              Amelia Michel reports that Amelia Michel has never smoked. Amelia Michel has never used smokeless tobacco.      Appropriate preventive services were discussed with this patient, including applicable screening as appropriate for cardiovascular disease, diabetes, osteopenia/osteoporosis, and glaucoma.  As appropriate for age/gender, discussed screening for colorectal cancer, prostate cancer, breast cancer, and cervical cancer. Checklist reviewing preventive services available has been given to the patient.    Reviewed patients plan of care and provided an AVS. The Basic Care Plan (routine screening as documented in Health Maintenance) for Amelia meets the Care Plan requirement. This Care Plan has been established and reviewed with the Patient.      Gala Stringer PA-C  [facility]    Identified Health Risks:

## 2022-12-20 NOTE — PROGRESS NOTES
[facility]  Date of visit: Dec 21, 2022      Reason for Visit: follow up DLBCL      Oncology HPI:   1. Complicated patient with history of Rheumatoid Arthritis status post long term treatment with methotrexate with significant complicated course with cardiac arrest, admission with hypotension, sepsis, ICU stay and intubation with subsequent additional readmission from TCU in 6/2017 for FUO with extensive work-up with eventual finding of progression cytopenias with eventual lymphoma diagnosis  2. Bone Marrow Biopsy: 7/12/2017: Highly suspicious for involvement by B cell lymphoma with foci of large atypical CD20+ cells  3. PET CT 7/19/2017: Extensive activity: Right paratracheal lesion with SUV 28, many liver lesions with SUV 15, cardiac lesion intraarterial septum, numerous bone lesions.  4. 7/21 Liver Biopsy: Consistent with Diffuse Large B Cell Lymphoma non-germinal center phenotype  -- FISH for myc, bcl2, bcl6 negative for double-hit lymphoma  5. IPI based on Age < 60 (0 points) + PS 2 (1 + point) + Stage IV Disease (1 point) + Extranodal disease > 1 site (1 point) + elevated LDH (1 point) = 4 Poor Risk Disease  6. Cycle 1 given as R-EPOCH Day 1 = 7/27/2017  -- complications of transfusion dependence and need for acute rehab placement (likely more result of extensive hospital stays)  - LP negative for CNS involvement 8/23/2017  7. Cycle 2: Transition to R-CHOP Day 1 = 8/22/2017  - Day 15 high dose MTX for CNS prophylaxis  - complicated by significant fluid overload and PICC associated DVT  8. Cycle #3 Day 1 = 9/20/2017 Post Cycle 3 PET CR. Cycle 4 10/11/2017 + IT prophylaxis, Cycle 5 11/8/17, Cycle 6 11/29/2017 + IT prophylaxis  -- Post Treatment completion PET 1/15/2018: Complete Remission      Interval history:   Amelia is here for follow up  Continues with waxing and waning energy levels. Appetite has been stable, trends her weight very closely with her cardiac issues.   No  fevers or recent infections.   No bleeding  No headaches or dizziness, lightheaded when standing too fast.  Neuropathy persists, worse in right leg. This goes up her entire leg. She has pressure points that affects how/ where she can sit. Left leg more in foot. Left hand numb since her arrest/ infiltrate.   No night sweats. No concerning lumps or bumps. No fevers or recent infections.   Has a lot of cardiac follow up.   Has some hand cramping which she will review with her rheumatologist.       Current Outpatient Medications   Medication Sig Dispense Refill     alendronate (FOSAMAX) 70 MG tablet TAKE 1 TABLET(70 MG) BY MOUTH EVERY 7 DAYS 12 tablet 3     apixaban ANTICOAGULANT (ELIQUIS ANTICOAGULANT) 5 MG tablet Take 1 tablet (5 mg) by mouth 2 times daily 180 tablet 3     atenolol (TENORMIN) 50 MG tablet Take 1 tablet (50 mg) by mouth 2 times daily 180 tablet 3     calcium carbonate 600 mg-vitamin D 400 units (CALTRATE) 600-400 MG-UNIT per tablet Take 2 tablets by mouth daily       empagliflozin (JARDIANCE) 10 MG TABS tablet Take 1 tablet (10 mg) by mouth daily for 360 days 90 tablet 3     furosemide (LASIX) 40 MG tablet Take 1 tablet (40 mg) by mouth 2 times daily for 240 days 120 tablet 3     gabapentin (NEURONTIN) 100 MG capsule Take 1 capsule AM + 1 capsule afternoon + 2 capsules at bedtime 360 capsule 1     hydroxychloroquine (PLAQUENIL) 200 MG tablet Take 1 tablet (200 mg) by mouth daily 90 tablet 3     loratadine (CLARITIN) 10 MG tablet Take 10 mg by mouth daily       magnesium oxide 200 MG TABS Take 200 mg by mouth daily bedtime       multivitamin, therapeutic with minerals (THERA-VIT-M) TABS tablet Take 1 tablet by mouth daily 30 each 0     predniSONE (DELTASONE) 1 MG tablet Take 3 tablets (3 mg) by mouth daily 270 tablet 3     spironolactone (ALDACTONE) 25 MG tablet Take 2 tablets (50 mg) by mouth daily 90 tablet 3     acetaminophen (TYLENOL) 500 MG tablet Take 500 mg by mouth every 6 hours as needed for  mild pain (Patient not taking: Reported on 12/19/2022)       STATIN NOT PRESCRIBED (INTENTIONAL) Please choose reason not prescribed, below (Patient not taking: Reported on 12/19/2022)         Allergies   Allergen Reactions     Amiodarone Other (See Comments) and Difficulty breathing     Lethargic and had trouble breathing- occurred in 2017     Blood Transfusion Related (Informational Only) Hives     Hives with platelets. Give benadryl premedication.     Metoprolol Other (See Comments)     Pt and  report that metoprolol does not work for her and also reports feeling unwell with this medication. She has been able to tolerate atenolol, which as worked in controlling her HR.      Other [No Clinical Screening - See Comments]      Penicillin Allergy Skin Test not performed, see antimicrobial management team progress note 7/5/17.       Penicillins      Tape [Adhesive Tape] Rash         Physical Exam:  /82   Pulse 103   Temp 97  F (36.1  C)   Resp 16   Wt 49.8 kg (109 lb 12.8 oz)   SpO2 98%   BMI 23.08 kg/m    General: No acute distress.  HEENT: EOMI, PERRL. Sclerae are anicteric. Oral mucosa is pink and moist with no lesions or thrush.   Lymph: Neck is supple with no lymphadenopathy in the cervical or supraclavicular areas.   Heart: Regular rate and rhythm.   Lungs: Clear to auscultation bilaterally. Pacemaker protruding left chest wall  Abdomen: Bowel sounds present, soft, nontender with no palpable hepatosplenomegaly or masses.   Extremities: No lower extremity edema noted bilaterally.   Neuro: Alert and oriented x3, CN grossly intact, steady gait  Skin: No rashes, petechiae, or bruising noted on exposed skin.      Labs:     I personally reviewed the following labs:    Most Recent 3 CBC's:  Recent Labs   Lab Test 12/21/22  1039 12/05/22  0642 12/04/22  1506   WBC 5.3 4.7 6.2   HGB 15.7 14.4 15.2   * 104* 104*   * 63* 64*     Most Recent 3 BMP's:  Recent Labs   Lab Test 12/21/22  1039  12/05/22  0642 12/04/22  1506   * 130* 130*   POTASSIUM 3.9 3.8 4.2   CHLORIDE 89* 96* 94*   CO2 26 18* 21*   BUN 38.8* 32.9* 31.7*   CR 1.22* 1.08 1.06   ANIONGAP 13 16* 15   VIKTORIYA 9.8 9.1 8.8   * 70 119*     Most Recent 2 LFT's:  Recent Labs   Lab Test 12/21/22  1039 12/05/22  0642   AST 65* 55*   ALT 39 32   ALKPHOS 132* 107   BILITOTAL 2.0* 1.9*           Imaging: n/a      Impression/plan:     62 year old woman with history of rhematoid arthritis and history of stage 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 She continues in CR. No signs of disease recurrence or progression.     # DLBCL  s/p 1 REPOCH and 5 RCHOP. Now in complete remission. Last chemo 12/2017. No evidence of relapse by history or physical exam.  We will see her next year with labs and physical exam.  - Survivorship follow up, see her 1 year with labs      # Thrombocytopenia  Chronic. Fluctuates. Increased to 104 today, up from 50-60s in the hospital. No bleeding.        # Rheumatoid arthritis  She follows with rheumatology. Continues on pred and plaquinel. Follow up on 9/16       # A Fib with pacemaker  Follows with cardiology. Is on optimal medical therapy. Has been trying to lose weight and increase exercise. Has been through cardiac rehab.         # Neuropathy  Continues on gabapentin. Stable. No worsening but no significant improvement either            [facility]    40 minutes spent on the date of the encounter doing chart review, review of test results, interpretation of tests, patient visit and documentation

## 2022-12-21 ENCOUNTER — APPOINTMENT (OUTPATIENT)
Dept: LAB | Facility: CLINIC | Age: 62
End: 2022-12-21
Attending: NURSE PRACTITIONER
Payer: COMMERCIAL

## 2022-12-21 ENCOUNTER — ONCOLOGY VISIT (OUTPATIENT)
Dept: ONCOLOGY | Facility: CLINIC | Age: 62
End: 2022-12-21
Attending: NURSE PRACTITIONER
Payer: COMMERCIAL

## 2022-12-21 VITALS
SYSTOLIC BLOOD PRESSURE: 106 MMHG | TEMPERATURE: 97 F | BODY MASS INDEX: 23.08 KG/M2 | OXYGEN SATURATION: 98 % | HEART RATE: 103 BPM | WEIGHT: 109.8 LBS | DIASTOLIC BLOOD PRESSURE: 82 MMHG | RESPIRATION RATE: 16 BRPM

## 2022-12-21 DIAGNOSIS — I50.9 CHRONIC CONGESTIVE HEART FAILURE, UNSPECIFIED HEART FAILURE TYPE (H): ICD-10-CM

## 2022-12-21 DIAGNOSIS — I50.33 ACUTE ON CHRONIC DIASTOLIC CONGESTIVE HEART FAILURE (H): ICD-10-CM

## 2022-12-21 DIAGNOSIS — C83.30 DIFFUSE LARGE B-CELL LYMPHOMA, UNSPECIFIED BODY REGION (H): ICD-10-CM

## 2022-12-21 LAB
ALBUMIN SERPL BCG-MCNC: 4.3 G/DL (ref 3.5–5.2)
ALP SERPL-CCNC: 132 U/L (ref 35–129)
ALT SERPL W P-5'-P-CCNC: 39 U/L (ref 10–50)
ANION GAP SERPL CALCULATED.3IONS-SCNC: 13 MMOL/L (ref 7–15)
AST SERPL W P-5'-P-CCNC: 65 U/L (ref 10–50)
BASOPHILS # BLD AUTO: 0 10E3/UL (ref 0–0.2)
BASOPHILS NFR BLD AUTO: 1 %
BILIRUB SERPL-MCNC: 2 MG/DL
BUN SERPL-MCNC: 38.8 MG/DL (ref 8–23)
CALCIUM SERPL-MCNC: 9.8 MG/DL (ref 8.8–10.2)
CHLORIDE SERPL-SCNC: 89 MMOL/L (ref 98–107)
CHOLEST SERPL-MCNC: 98 MG/DL
CREAT SERPL-MCNC: 1.22 MG/DL (ref 0.51–1.17)
DEPRECATED HCO3 PLAS-SCNC: 26 MMOL/L (ref 22–29)
EOSINOPHIL # BLD AUTO: 0 10E3/UL (ref 0–0.7)
EOSINOPHIL NFR BLD AUTO: 0 %
ERYTHROCYTE [DISTWIDTH] IN BLOOD BY AUTOMATED COUNT: 15.6 % (ref 10–15)
GFR SERPL CREATININE-BSD FRML MDRD: 50 ML/MIN/1.73M2
GLUCOSE SERPL-MCNC: 104 MG/DL (ref 70–99)
HCT VFR BLD AUTO: 44.6 % (ref 35–53)
HDLC SERPL-MCNC: 43 MG/DL
HGB BLD-MCNC: 15.7 G/DL (ref 11.7–17.7)
IMM GRANULOCYTES # BLD: 0 10E3/UL
IMM GRANULOCYTES NFR BLD: 0 %
LDH SERPL L TO P-CCNC: 430 U/L (ref 0–250)
LDLC SERPL CALC-MCNC: 42 MG/DL
LYMPHOCYTES # BLD AUTO: 0.4 10E3/UL (ref 0.8–5.3)
LYMPHOCYTES NFR BLD AUTO: 7 %
MAGNESIUM SERPL-MCNC: 2.4 MG/DL (ref 1.7–2.3)
MCH RBC QN AUTO: 36.3 PG (ref 26.5–33)
MCHC RBC AUTO-ENTMCNC: 35.2 G/DL (ref 31.5–36.5)
MCV RBC AUTO: 103 FL (ref 78–100)
MONOCYTES # BLD AUTO: 0.7 10E3/UL (ref 0–1.3)
MONOCYTES NFR BLD AUTO: 12 %
NEUTROPHILS # BLD AUTO: 4.2 10E3/UL (ref 1.6–8.3)
NEUTROPHILS NFR BLD AUTO: 80 %
NONHDLC SERPL-MCNC: 55 MG/DL
NRBC # BLD AUTO: 0 10E3/UL
NRBC BLD AUTO-RTO: 0 /100
PLATELET # BLD AUTO: 104 10E3/UL (ref 150–450)
POTASSIUM SERPL-SCNC: 3.9 MMOL/L (ref 3.4–5.3)
PROT SERPL-MCNC: 7.8 G/DL (ref 6.4–8.3)
RBC # BLD AUTO: 4.33 10E6/UL (ref 3.8–5.9)
SODIUM SERPL-SCNC: 128 MMOL/L (ref 136–145)
TRIGL SERPL-MCNC: 67 MG/DL
WBC # BLD AUTO: 5.3 10E3/UL (ref 4–11)

## 2022-12-21 PROCEDURE — 82040 ASSAY OF SERUM ALBUMIN: CPT | Performed by: NURSE PRACTITIONER

## 2022-12-21 PROCEDURE — 85025 COMPLETE CBC W/AUTO DIFF WBC: CPT | Performed by: NURSE PRACTITIONER

## 2022-12-21 PROCEDURE — 80053 COMPREHEN METABOLIC PANEL: CPT | Performed by: NURSE PRACTITIONER

## 2022-12-21 PROCEDURE — 83735 ASSAY OF MAGNESIUM: CPT | Performed by: NURSE PRACTITIONER

## 2022-12-21 PROCEDURE — 80061 LIPID PANEL: CPT | Performed by: NURSE PRACTITIONER

## 2022-12-21 PROCEDURE — 83615 LACTATE (LD) (LDH) ENZYME: CPT | Performed by: NURSE PRACTITIONER

## 2022-12-21 PROCEDURE — 36415 COLL VENOUS BLD VENIPUNCTURE: CPT | Performed by: NURSE PRACTITIONER

## 2022-12-21 PROCEDURE — 99215 OFFICE O/P EST HI 40 MIN: CPT | Performed by: NURSE PRACTITIONER

## 2022-12-21 PROCEDURE — G0463 HOSPITAL OUTPT CLINIC VISIT: HCPCS | Performed by: NURSE PRACTITIONER

## 2022-12-21 PROCEDURE — G0463 HOSPITAL OUTPT CLINIC VISIT: HCPCS

## 2022-12-21 ASSESSMENT — PAIN SCALES - GENERAL: PAINLEVEL: MILD PAIN (3)

## 2022-12-21 NOTE — NURSING NOTE
"Oncology Rooming Note    December 21, 2022 10:53 AM   Amelia Michel is a 62 year old adult who presents for:    Chief Complaint   Patient presents with     Blood Draw     Labs drawn via  by rn in lab. VS taken.     Oncology Clinic Visit     Diffuse large B-cell lymphoma     Initial Vitals: /82   Pulse 103   Temp 97  F (36.1  C)   Resp 16   Wt 49.8 kg (109 lb 12.8 oz)   SpO2 98%   BMI 23.08 kg/m   Estimated body mass index is 23.08 kg/m  as calculated from the following:    Height as of 12/19/22: 1.469 m (4' 9.84\").    Weight as of this encounter: 49.8 kg (109 lb 12.8 oz). Body surface area is 1.43 meters squared.  Mild Pain (3) Comment: Data Unavailable   No LMP recorded. Patient is postmenopausal.  Allergies reviewed: Yes  Medications reviewed: Yes    Medications: Medication refills not needed today.  Pharmacy name entered into [employer]: [id number] - [place], [place] - [address]    Clinical concerns: none       Sobeida Zeng"

## 2022-12-21 NOTE — LETTER
12/21/2022         RE: Amelia Michel  [address]  [place] [address]        Dear Colleague,    Thank you for referring your patient, Amelia Michel, to the [facility]. Please see a copy of my visit note below.    [facility]  Date of visit: Dec 21, 2022      Reason for Visit: follow up DLBCL      Oncology HPI:   1. Complicated patient with history of Rheumatoid Arthritis status post long term treatment with methotrexate with significant complicated course with cardiac arrest, admission with hypotension, sepsis, ICU stay and intubation with subsequent additional readmission from TCU in 6/2017 for FUO with extensive work-up with eventual finding of progression cytopenias with eventual lymphoma diagnosis  2. Bone Marrow Biopsy: 7/12/2017: Highly suspicious for involvement by B cell lymphoma with foci of large atypical CD20+ cells  3. PET CT 7/19/2017: Extensive activity: Right paratracheal lesion with SUV 28, many liver lesions with SUV 15, cardiac lesion intraarterial septum, numerous bone lesions.  4. 7/21 Liver Biopsy: Consistent with Diffuse Large B Cell Lymphoma non-germinal center phenotype  -- FISH for myc, bcl2, bcl6 negative for double-hit lymphoma  5. IPI based on Age < 60 (0 points) + PS 2 (1 + point) + Stage IV Disease (1 point) + Extranodal disease > 1 site (1 point) + elevated LDH (1 point) = 4 Poor Risk Disease  6. Cycle 1 given as R-EPOCH Day 1 = 7/27/2017  -- complications of transfusion dependence and need for acute rehab placement (likely more result of extensive hospital stays)  - LP negative for CNS involvement 8/23/2017  7. Cycle 2: Transition to R-CHOP Day 1 = 8/22/2017  - Day 15 high dose MTX for CNS prophylaxis  - complicated by significant fluid overload and PICC associated DVT  8. Cycle #3 Day 1 = 9/20/2017 Post Cycle 3 PET CR. Cycle 4 10/11/2017 + IT prophylaxis, Cycle 5 11/8/17, Cycle 6 11/29/2017 + IT  prophylaxis  -- Post Treatment completion PET 1/15/2018: Complete Remission      Interval history:   Amelia is here for follow up  Continues with waxing and waning energy levels. Appetite has been stable, trends her weight very closely with her cardiac issues.   No fevers or recent infections.   No bleeding  No headaches or dizziness, lightheaded when standing too fast.  Neuropathy persists, worse in right leg. This goes up her entire leg. She has pressure points that affects how/ where she can sit. Left leg more in foot. Left hand numb since her arrest/ infiltrate.   No night sweats. No concerning lumps or bumps. No fevers or recent infections.   Has a lot of cardiac follow up.   Has some hand cramping which she will review with her rheumatologist.       Current Outpatient Medications   Medication Sig Dispense Refill     alendronate (FOSAMAX) 70 MG tablet TAKE 1 TABLET(70 MG) BY MOUTH EVERY 7 DAYS 12 tablet 3     apixaban ANTICOAGULANT (ELIQUIS ANTICOAGULANT) 5 MG tablet Take 1 tablet (5 mg) by mouth 2 times daily 180 tablet 3     atenolol (TENORMIN) 50 MG tablet Take 1 tablet (50 mg) by mouth 2 times daily 180 tablet 3     calcium carbonate 600 mg-vitamin D 400 units (CALTRATE) 600-400 MG-UNIT per tablet Take 2 tablets by mouth daily       empagliflozin (JARDIANCE) 10 MG TABS tablet Take 1 tablet (10 mg) by mouth daily for 360 days 90 tablet 3     furosemide (LASIX) 40 MG tablet Take 1 tablet (40 mg) by mouth 2 times daily for 240 days 120 tablet 3     gabapentin (NEURONTIN) 100 MG capsule Take 1 capsule AM + 1 capsule afternoon + 2 capsules at bedtime 360 capsule 1     hydroxychloroquine (PLAQUENIL) 200 MG tablet Take 1 tablet (200 mg) by mouth daily 90 tablet 3     loratadine (CLARITIN) 10 MG tablet Take 10 mg by mouth daily       magnesium oxide 200 MG TABS Take 200 mg by mouth daily bedtime       multivitamin, therapeutic with minerals (THERA-VIT-M) TABS tablet Take 1 tablet by mouth daily 30 each 0      predniSONE (DELTASONE) 1 MG tablet Take 3 tablets (3 mg) by mouth daily 270 tablet 3     spironolactone (ALDACTONE) 25 MG tablet Take 2 tablets (50 mg) by mouth daily 90 tablet 3     acetaminophen (TYLENOL) 500 MG tablet Take 500 mg by mouth every 6 hours as needed for mild pain (Patient not taking: Reported on 12/19/2022)       STATIN NOT PRESCRIBED (INTENTIONAL) Please choose reason not prescribed, below (Patient not taking: Reported on 12/19/2022)         Allergies   Allergen Reactions     Amiodarone Other (See Comments) and Difficulty breathing     Lethargic and had trouble breathing- occurred in 2017     Blood Transfusion Related (Informational Only) Hives     Hives with platelets. Give benadryl premedication.     Metoprolol Other (See Comments)     Pt and  report that metoprolol does not work for her and also reports feeling unwell with this medication. She has been able to tolerate atenolol, which as worked in controlling her HR.      Other [No Clinical Screening - See Comments]      Penicillin Allergy Skin Test not performed, see antimicrobial management team progress note 7/5/17.       Penicillins      Tape [Adhesive Tape] Rash         Physical Exam:  /82   Pulse 103   Temp 97  F (36.1  C)   Resp 16   Wt 49.8 kg (109 lb 12.8 oz)   SpO2 98%   BMI 23.08 kg/m    General: No acute distress.  HEENT: EOMI, PERRL. Sclerae are anicteric. Oral mucosa is pink and moist with no lesions or thrush.   Lymph: Neck is supple with no lymphadenopathy in the cervical or supraclavicular areas.   Heart: Regular rate and rhythm.   Lungs: Clear to auscultation bilaterally. Pacemaker protruding left chest wall  Abdomen: Bowel sounds present, soft, nontender with no palpable hepatosplenomegaly or masses.   Extremities: No lower extremity edema noted bilaterally.   Neuro: Alert and oriented x3, CN grossly intact, steady gait  Skin: No rashes, petechiae, or bruising noted on exposed skin.      Labs:     I  personally reviewed the following labs:    Most Recent 3 CBC's:  Recent Labs   Lab Test 12/21/22  1039 12/05/22  0642 12/04/22  1506   WBC 5.3 4.7 6.2   HGB 15.7 14.4 15.2   * 104* 104*   * 63* 64*     Most Recent 3 BMP's:  Recent Labs   Lab Test 12/21/22  1039 12/05/22  0642 12/04/22  1506   * 130* 130*   POTASSIUM 3.9 3.8 4.2   CHLORIDE 89* 96* 94*   CO2 26 18* 21*   BUN 38.8* 32.9* 31.7*   CR 1.22* 1.08 1.06   ANIONGAP 13 16* 15   VIKTORIYA 9.8 9.1 8.8   * 70 119*     Most Recent 2 LFT's:  Recent Labs   Lab Test 12/21/22  1039 12/05/22  0642   AST 65* 55*   ALT 39 32   ALKPHOS 132* 107   BILITOTAL 2.0* 1.9*           Imaging: n/a      Impression/plan:     62 year old woman with history of rhematoid arthritis and history of stage 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 She continues in CR. No signs of disease recurrence or progression.     # DLBCL  s/p 1 REPOCH and 5 RCHOP. Now in complete remission. Last chemo 12/2017. No evidence of relapse by history or physical exam.  We will see her next year with labs and physical exam.  - Survivorship follow up, see her 1 year with labs      # Thrombocytopenia  Chronic. Fluctuates. Increased to 104 today, up from 50-60s in the hospital. No bleeding.        # Rheumatoid arthritis  She follows with rheumatology. Continues on pred and plaquinel. Follow up on 9/16       # A Fib with pacemaker  Follows with cardiology. Is on optimal medical therapy. Has been trying to lose weight and increase exercise. Has been through cardiac rehab.         # Neuropathy  Continues on gabapentin. Stable. No worsening but no significant improvement either            Zuleima Lin ACNP-BC    40 minutes spent on the date of the encounter doing chart review, review of test results, interpretation of tests, patient visit and documentation

## 2022-12-21 NOTE — NURSING NOTE
Chief Complaint   Patient presents with     Blood Draw     Labs drawn via  by rn in lab. VS taken.     Labs collected from venipuncture by RN. Vitals taken. Checked in for appointment(s).    Jesus Rajan RN

## 2022-12-29 NOTE — PROGRESS NOTES
[facility] Cardiology   [facility]      HPI:   Ms. 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 She was recently admitted 11/19/-12/5/22 with ADHF and frequent NSVT, now s/p VT ablation. [place] [facility] [place].     With regards to her cardiac conditions, as noted, Ms. Michel presented to [facility] on 11/19 with MONTALVO, BLE edema, and 8 pound weight gain over the course of one week. Chest X-ray was suspicious for pulmonary edema, NT-P BNP 1,913, Troponin T 21-->12. EDW per chart review 116-120; admission weight 128. She was admitted w/ decompensated diastolic heart failure with pictutre c/b frequent non sustained ventricular tachycardia with RHC on 11/28/22 notable for cardiogenic shock with a cardiac index of 1.2 and CO of 1.6. Etiology thought to most likely secondary to frequent non sustained ventricular tachycardia in the setting of possible restrictive/constrictive physiology given her malignancy history (albeit presumably in remission). CAD ruled on by coronary angiogram completed on 11/28/22. She underwent VT ablation on 12/1/22 and had successful resumption of her home medications with increased diuretic dosing and adddition of an SGLT2 inhibitor. She was diuresed 19 lbs to a weight of 109 lbs at time of discharge.     At CORE follow up on 12/8/22, Ms. Michel was feeling well. She remained euvolemic and labs were stable. Discussed cardioMEMs and proceeded with insurance PA. On 1/5/22, Ms. Michel called reporting 5 lb weight gain in 10 days and increased peripheral edema with ruddiness. No shortness of breath, PND,  orthopnea. Lasix was increase to 60 mg x 2 days in the morning, and continued 40 mg in the evening.     Today, [place]. [place] [facility] [place] . Since increasing lasix to 60 mg over the weekend she has noticed an improvement in her peripheral edema, although she notes ongoing pedal edema and snug fitting shoes. Home weights 110-115 lbs, +3 lb in 24 hr. At last visit her home weight was around 106 lb. Thinks that a few pounds are due to increased appetite/caloric intake, however she also tells me that she feels volume up.   She is participating in PT for a shoulder injury. Continues to feel fatigued and deconditioned. Has the desire to do activities, but does not have the energy.  She has baseline MONTALVO, stable. No chest discomfort, SOB, palpitations, lightheadedness, orthopnea, PND, syncope, or presyncope. Remains on eliquis for CVA prophylaxis. Denies any bleeding episodes. Home BP mid to high 110s-120s/80s.      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     VSD (ventricular septal defect)        FAMILY HISTORY:  Family History   Problem Relation Age of Onset     Breast Cancer Mother         60s     Hypertension Mother      Alzheimer Disease Mother      Cerebrovascular Disease Mother      Hypertension Father      Cerebrovascular Disease Father      Atrial fibrillation Father      Lymphoma Father      Prostate Cancer Father      Alzheimer Disease Maternal Grandmother         likely     Arthritis Maternal Grandfather      Pneumonia Maternal Grandfather      Diabetes Paternal Grandmother        SOCIAL  HISTORY:  Social History     Socioeconomic History     Marital status:      Spouse name: Romeo Michel     Number of children: 0   Occupational History     Employer: [employer]   Tobacco Use     Smoking status: Never     Smokeless tobacco: Never   Substance and Sexual Activity     Alcohol use: Not Currently     Comment: none     Drug use: No   Other Topics Concern     Parent/sibling w/ CABG, MI or angioplasty before 65F 55M? No     Social Determinants of Health     Intimate Partner Violence: Not At Risk     Fear of Current or Ex-Partner: No     Emotionally Abused: No     Physically Abused: No     Sexually Abused: No       CURRENT MEDICATIONS:  acetaminophen (TYLENOL) 500 MG tablet, Take 500 mg by mouth every 6 hours as needed for mild pain (Patient not taking: Reported on 12/19/2022)  alendronate (FOSAMAX) 70 MG tablet, TAKE 1 TABLET(70 MG) BY MOUTH EVERY 7 DAYS  apixaban ANTICOAGULANT (ELIQUIS ANTICOAGULANT) 5 MG tablet, Take 1 tablet (5 mg) by mouth 2 times daily  atenolol (TENORMIN) 50 MG tablet, Take 1 tablet (50 mg) by mouth 2 times daily  calcium carbonate 600 mg-vitamin D 400 units (CALTRATE) 600-400 MG-UNIT per tablet, Take 2 tablets by mouth daily  empagliflozin (JARDIANCE) 10 MG TABS tablet, Take 1 tablet (10 mg) by mouth daily for 360 days  furosemide (LASIX) 40 MG tablet, Take 1 tablet (40 mg) by mouth 2 times daily for 240 days  gabapentin (NEURONTIN) 100 MG capsule, Take 1 capsule AM + 1 capsule afternoon + 2 capsules at bedtime  hydroxychloroquine (PLAQUENIL) 200 MG tablet, Take 1 tablet (200 mg) by mouth daily  loratadine (CLARITIN) 10 MG tablet, Take 10 mg by mouth daily  magnesium oxide 200 MG TABS, Take 200 mg by mouth daily bedtime  multivitamin, therapeutic with minerals (THERA-VIT-M) TABS tablet, Take 1 tablet by mouth daily  predniSONE (DELTASONE) 1 MG tablet, Take 3 tablets (3 mg) by mouth daily  spironolactone (ALDACTONE) 25 MG tablet, Take 2 tablets (50 mg) by  mouth daily  STATIN NOT PRESCRIBED (INTENTIONAL), Please choose reason not prescribed, below (Patient not taking: Reported on 12/19/2022)    No current facility-administered medications on file prior to visit.      ROS:   Refer to HPI    EXAM:  There were no vitals taken for this visit.  GENERAL: Appears comfortable, in no acute distress.   HEENT: Eye symmetrical, no discharge or icterus bilaterally. Mucous membranes moist and without lesions.  CV: tachycardic, irregular, +S1S2, systolic murmur, no rub, or gallop. JVP at 10-12 cm   RESPIRATORY: Respirations regular, even, and unlabored. Lungs CTA throughout.   GI: Soft and non distended with normoactive bowel sounds present in all quadrants. No tenderness, rebound, guarding. No hepatomegaly.   EXTREMITIES: trace peripheral edema. 2+ bilateral pedal pulses.   NEUROLOGIC: Alert and oriented x 3. No focal deficits.   MUSCULOSKELETAL: No joint swelling or tenderness.   SKIN: No jaundice. No rashes or lesions.     Labs, reviewed with patient in clinic today:  CBC RESULTS:  Lab Results   Component Value Date    WBC 5.3 12/21/2022    WBC Canceled, Test credited 03/16/2021    RBC 4.33 12/21/2022    RBC Canceled, Test credited 03/16/2021    HGB 15.7 12/21/2022    HGB Canceled, Test credited 03/16/2021    HCT 44.6 12/21/2022    HCT Canceled, Test credited 03/16/2021     (H) 12/21/2022    MCV Canceled, Test credited 03/16/2021    MCH 36.3 (H) 12/21/2022    MCH Canceled, Test credited 03/16/2021    MCHC 35.2 12/21/2022    MCHC Canceled, Test credited 03/16/2021    RDW 15.6 (H) 12/21/2022    RDW Canceled, Test credited 03/16/2021     (L) 12/21/2022    PLT Canceled, Test credited 03/16/2021       CMP RESULTS:  Lab Results   Component Value Date     (L) 12/21/2022     (L) 11/29/2022     06/23/2021    POTASSIUM 3.9 12/21/2022    POTASSIUM 4.7 11/19/2022    POTASSIUM 3.9 07/20/2022    POTASSIUM 4.2 06/23/2021    CHLORIDE 89 (L) 12/21/2022    CHLORIDE  101 07/20/2022    CHLORIDE 102 06/23/2021    CO2 26 12/21/2022    CO2 28 07/20/2022    CO2 28 06/23/2021    ANIONGAP 13 12/21/2022    ANIONGAP 7 07/20/2022    ANIONGAP 6 06/23/2021     (H) 12/21/2022    GLC 92 12/01/2022     (H) 07/20/2022    GLC 98 06/23/2021    BUN 38.8 (H) 12/21/2022    BUN 31 (H) 07/20/2022    BUN 25 06/23/2021    CR 1.22 (H) 12/21/2022    CR 1.05 (H) 06/23/2021    GFRESTIMATED 50 (L) 12/21/2022    GFRESTIMATED 57 (L) 06/23/2021    GFRESTBLACK 67 06/23/2021    VIKTORIYA 9.8 12/21/2022    VIKTORIYA 10.0 06/23/2021    BILITOTAL 2.0 (H) 12/21/2022    BILITOTAL 1.2 06/23/2021    ALBUMIN 4.3 12/21/2022    ALBUMIN 4.4 04/20/2022    ALBUMIN 4.2 06/23/2021    ALKPHOS 132 (H) 12/21/2022    ALKPHOS 136 06/23/2021    ALT 39 12/21/2022    ALT 41 06/23/2021    AST 65 (H) 12/21/2022    AST 36 06/23/2021        INR RESULTS:  Lab Results   Component Value Date    INR 4.57 (H) 11/19/2022    INR 3.42 (H) 03/07/2018       Lab Results   Component Value Date    MAG 2.4 (H) 12/21/2022    MAG 1.9 04/12/2018     Lab Results   Component Value Date    NTBNPI 1,913 (H) 11/19/2022    NTBNPI 485 12/12/2019     Lab Results   Component Value Date    NTBNP 1,425 (H) 10/13/2021    NTBNP 1,274 (H) 05/26/2021       Cardiac Diagnostics:    12/5/2022 ECHO  Interpretation Summary  Severe tricuspid insufficiency is present.  Moderate right ventricular dilation is present. Global right ventricular  function is mildly reduced.  Global and regional left ventricular function is normal with an EF of 55-60%.  IVC diameter >2.1 cm collapsing <50% with sniff suggests a high RA pressure  estimated at 15 mmHg or greater.  No pericardial effusion is present.  No significant changes noted.    11/28/2022 RHC with coronary angiogram     Right sided filling pressures are severely elevated.    Mild elevated pulmonary hypertension.    Left sided filling pressures are moderately elevated.    Reduced cardiac output level.    Hemodynamic data has been  modified in Epic per physician review.    Prox LAD lesion is 30% stenosed.     Mild non-obstructive coronary artery disease        Assessment and Plan:   Ms. 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 She was recently admitted 11/19/-12/5/22 with ADHF and frequent NSVT, now s/p VT ablation. She presents to [facility] for hospital follow up.     Mildly hypervolemic on exam as evident by weight gain, tachycardia, and peripheral edema. Review of labs demonstrate persistent hyponatremia with Na 127 and bump in Cr to 1.35. Lasix was initially increased on 1/7 to 60/40 mg BID. Will increase further to 60 mg BID x 2 days and re-evaluate symptoms and weights by phone at end of week. She is presently on max dose of atenolol 50 mg BID for rate control. I suspect that her tachycardia is in light of extra volume. Will diurese for now,  however may be necessary to consider additional rate control agent in future. Still awaiting PA for cardiomems device.       # Acute on chronic diastolic heart failure with dilated and mildly reduced RV function (LVEF 55-60%)  Pre-Ablation: 11/28/22 RHC: CVP 24, PA 48/27/34, PCWP 23, SVO2 46, CI 1.1, CO 1.6  Post Ablation and Diuresis: 12/3/22 RHC: CVP 16, PA 41/22/28, PCWP 15, SVO2 69, CI 2.1, CO 3.0    NYHA Class III, AHA/ACC Stage C  Rate control: 127  volume status: hypervolemic, increase lasix to 60 mg BID x 3 days, EDW ~ 110 lb  Blood pressure control: Atenolol to 50 mg po BID.   Aldosterone antagonist: Aldactone 50 mg po daily   SGLT2:  Empagliflozin 10 mg daily   Evaluation of coronary arteries: 11/28/22 angiogram  mild non-obstructive CAD     # Frequent, non-sustained ventricular tachycardia s/p VT Ablation (by Dr. Eaton on 12/1/22)  - Anti-arrhythmic: none, s/p VT ablation  - Stopped PTA digoxin per EP recommendations  - Anticoagulation: given history of Afib, continue Apixaban 5 mg bid  - Follow up with EP specialists (Dr. Eaton and/or Maria Esther Cutler NP)    # HTN Intolerant to Metoprolol in the past.   - Atenolol to 50 mg po BID.      # Aflutter. History of SSS s/p PPM 12/19. Long standing history of atrial arrythmias.  - Atenolol as above.   - Continue Eliquis.   - Follow up with EP as scheduled.      # VSD s/p repair.     # History of exudative pericardial effusion.   - Stable per CT 3/19.     # Severe TR per TTE 11/2022  Secondary to non coaptation of tricuspid valves. Seen on BRE from 11/22/22 and remained severe on repeat TTE on 12/5/22 despite achieving clinical euvolemia and presumed dry weight. Patient was evaluated by surgery during this admission (Dr. Cope) who thought she may be a candidate for surgical intervention however, Ms. Michel would like to hold off on pursuing surgical intervention given improvement in her symptoms with plan to re-assess in the future.         Follow up:  - Dr. Eaton 2/1/23  - CORE in 4-6 weeks  - CORE with TTE 5/4/23      Chart review time today: 10 minutes  Visit time today: 30 minutes  Total time spent today: 40 minutes        Elmira Vasquez DNP, NP-C  Advance Heart Failure  1/9/2023

## 2022-12-30 ENCOUNTER — HOSPITAL ENCOUNTER (OUTPATIENT)
Dept: PHYSICAL THERAPY | Facility: REHABILITATION | Age: 62
Discharge: HOME | End: 2022-12-30
Attending: PHYSICIAN ASSISTANT
Payer: COMMERCIAL

## 2022-12-30 DIAGNOSIS — M25.511 ACUTE PAIN OF RIGHT SHOULDER: ICD-10-CM

## 2022-12-30 DIAGNOSIS — M62.81 MUSCLE WEAKNESS (GENERALIZED): Primary | ICD-10-CM

## 2022-12-30 PROCEDURE — 97110 THERAPEUTIC EXERCISES: CPT | Mod: GP | Performed by: PHYSICAL THERAPIST

## 2022-12-30 PROCEDURE — 97140 MANUAL THERAPY 1/> REGIONS: CPT | Mod: GP | Performed by: PHYSICAL THERAPIST

## 2022-12-30 PROCEDURE — 97161 PT EVAL LOW COMPLEX 20 MIN: CPT | Mod: GP | Performed by: PHYSICAL THERAPIST

## 2022-12-30 NOTE — PROGRESS NOTES
12/30/22 0700   General Information   Type of Visit Initial OP Ortho PT Evaluation   Start of Care Date 12/30/22   Referring Physician Gala Sauceda PA-C   Orders Evaluate and Treat   Date of Order 12/19/22   Certification Required? No   Medical Diagnosis Acute right shoulder pain   Surgical/Medical history reviewed Yes   Precautions/Limitations no known precautions/limitations   Weight-Bearing Status - LUE full weight-bearing   Weight-Bearing Status - RUE full weight-bearing   Weight-Bearing Status - LLE full weight-bearing   Weight-Bearing Status - RLE full weight-bearing       Present No   Body Part(s)   Body Part(s) Shoulder   Presentation and Etiology   Pertinent history of current problem (include personal factors and/or comorbidities that impact the POC) Patient reports she had to transfer befween one cart to another and tried to push down and boost her self up and had an immediate pain in the anterior shoulder. her arm went al ittle numb but that has been better, she baseline has neuropathy from chemotherapy and from being in cardiac arrest. This was in Novermber in 2022 to end of decemenber. Shoulder pain is intermittent, there is some days that she can do more with the shoulder and other days she cant do much with it.   Impairments A. Pain;C. Swelling;D. Decreased ROM;E. Decreased flexibility;F. Decreased strength and endurance   Functional Limitations perform activities of daily living;perform desired leisure / sports activities   Symptom Location anterior lateral shoulder shoulder   How/Where did it occur While lifting;From insidious onset   Onset date of current episode/exacerbation 12/19/22   Chronicity New   Pain rating (0-10 point scale) Best (/10);Worst (/10)   Best (/10) 1   Worst (/10) 7   Pain quality B. Dull;C. Aching  (thorbbing, sharp pain with certain movements and laying on the right side)   Frequency of pain/symptoms A. Constant   Pain/symptoms are: Worse  during the night  (at the end of the day)   Pain/symptoms exacerbated by C. Lifting;D. Carrying;G. Certain positions;J. ADL;K. Home tasks;H. Overhead reach   Pain/symptoms eased by C. Rest;E. Changing positions;I. OTC medication(s);G. Heat;H. Cold   Progression of symptoms since onset: Unchanged   Current / Previous Interventions   Diagnostic Tests:   (none done yet)   Current Level of Function   Current Community Support Family/friend caregiver   Patient role/employment history F. Retired   Living environment House/[facility]   Home/community accessibility samantha, 2 steps in from garage with handrails, sit down showers, everything on main floor, lives with her    Current equipment-Gait/Locomotion None   Current equipment-ADL None   Fall Risk Screen   Fall screen completed by PT   Have you fallen 2 or more times in the past year? No   Have you fallen and had an injury in the past year? No   Is patient a fall risk? No   Abuse Screen (yes response referral indicated)   Feels Unsafe at Home or Work/School no   Feels Threatened by Someone no   Does Anyone Try to Keep You From Having Contact with Others or Doing Things Outside Your Home? no   Physical Signs of Abuse Present no   Patient needs abuse support services and resources No   System Outcome Measures   Outcome Measures   (SPADI 70%)   Shoulder Objective Findings   Side (if bilateral, select both right and left) Right   Shoulder ROM Comment PROM limited due to patient guarding   Neer's Test positive for pain at end range   Kirby-Zeke Test positive for pain   Brunswick's Test positive for pain and weakness   Relocation Test some pain with ER end range, improves with posterior pressure on the shoulder GH joint with improved ROM and less pain and guarding   Sulcus Test negative   Crossover Test mild pain anterior shoulder   Palpation tender to lateral shoulder joint space, supraspinatus   Accessory Motion/Joint Mobility not fully assessed today due to some  guarding   Right Shoulder Flexion AROM 134  (L 150)   Right Shoulder Abduction AROM 125 pain at 90 and stiff at the end range  (130 on L side no pain)   Right Shoulder ER AROM WNL no pain bilaterally   Right Shoulder IR AROM IR/ext to right lateral hip on right side; L to T8   Right Shoulder Flexion Strength 3+ (pain limiting)   Right Shoulder Abduction Strength 3+ painful and did not take any resistance   Right Shoulder ER Strength 4/5 min pain   Right Shoulder IR Strength 3/5 painful on R side   Right Shoulder Extension Strength 5-/5   Planned Therapy Interventions   Planned Therapy Interventions joint mobilization;manual therapy;neuromuscular re-education;ROM;stretching;strengthening   Planned Modality Interventions   Planned Modality Interventions Ultrasound   Planned Modality Interventions Comments is needed for pain and inflammation   Clinical Impression   Criteria for Skilled Therapeutic Interventions Met yes, treatment indicated   PT Diagnosis Acute Right Shoulder Pain   Influenced by the following impairments pain, weakness, decreased ROM   Functional limitations due to impairments reaching, lifting ,carrying, self cares   Clinical Presentation Stable/Uncomplicated   Clinical Decision Making (Complexity) Low complexity   Therapy Frequency 1 time/week   Predicted Duration of Therapy Intervention (days/wks) up to 12 weeks, decreasing in frequency as needed   Risk & Benefits of therapy have been explained Yes   Patient, Family & other staff in agreement with plan of care Yes   Clinical Impression Comments Patient is a 63 yo F that presents to therapy with complaints of acute right shoulder pain that started at the end of November to early Decemeber 2022 when she was in the hospital trying to transfer herself from one bed to another and pushed down with arms to lift her bottom up to shift when she felt a sharp pain in the anterior shoulder. Patient PMHx signficant for Lymphoma, chemtherapy induced neuropathy,  critical care myopahty, cardiac arrest, right sided congestive hear failure with pace make placed, stage III kidney diease, HTN, pulmonary HTN,. She was recently in the hosputal from 11/19/22 to 12/5/22 for congestive heart failure and cargiogenic shock when the shoulder injury occured. Since onset the motjon is improved somewhat but noted pain and weakness continues wiht activiteis overhead and she is unable to reach benind her back or back of head at times for self cares and ADLs. Patient presents with some apprehension in ER that improves with posterior translation of the GH joint suggestive of a labral pathology, special testing also demonstrated inflammation of the RC muscles with weakness of the shoulder in abduction, scaption and IR but appears mostly limited by pain, Paitent will benefit from skilled therapy to decrease pain and inflammation, increase ROM and strength in order to improved reaching, lifting, carrying and ADLs.   Education Assessment   Preferred Learning Style Pictures/video;Reading;Demonstration;Listening   Barriers to Learning No barriers   ORTHO GOALS   PT Ortho Eval Goals 1;2;3   Ortho Goal 1   Goal Identifier HEP   Goal Description Patient will be independent in a HEP in 12 weeks   Target Date 03/24/23   Ortho Goal 2   Goal Identifier Reaching   Goal Description Patient will increase overhead reach to 150* flexion and able to reach behind her back without increase in pain for improvement in self cares and ADLs in 12 weeks   Target Date 03/24/23   Ortho Goal 3   Goal Identifier Lifting   Goal Description Patient will be able to lift >10# without increase in pain and icreased ease for improvement in home tasks such as putting dishes away and laundry in 12 weeks   Target Date 03/24/23   Total Evaluation Time   PT Zulma, Low Complexity Minutes (15692) 35     Marci Sprague, PT, DPT, CLT-CRALA

## 2023-01-02 DIAGNOSIS — T38.0X5A STEROID-INDUCED OSTEOPOROSIS: ICD-10-CM

## 2023-01-02 DIAGNOSIS — M05.79 RHEUMATOID ARTHRITIS INVOLVING MULTIPLE SITES WITH POSITIVE RHEUMATOID FACTOR (H): ICD-10-CM

## 2023-01-02 DIAGNOSIS — M81.8 STEROID-INDUCED OSTEOPOROSIS: ICD-10-CM

## 2023-01-02 DIAGNOSIS — I50.22 CHRONIC SYSTOLIC HEART FAILURE (H): Primary | ICD-10-CM

## 2023-01-02 NOTE — LETTER
Dear Amelia Michel,     Regular eye exams are required while taking hydroxychloroquine (Plaquenil). Eye exams should be completed by an eye specialist who is experienced in monitoring for hydroxychloroquine toxicity (a rare effect of the drug that can damage your eyes and vision).  These may be yearly or as determined by your eye specialist.     Although vision problems and loss of sight while taking hydroxychloroquine are very rare, notify your doctor immediately if you notice changes in your vision. The goal of screening is to detect toxicity before your vision is significantly or noticeably impacted. Failing to get proper screening exams puts you at risk of vision changes which may or may not be reversible.    Per the American Academy of Ophthalmology recommendations (2016), screening tests performed may include a 10-2 visual field test, spectral-domain optical coherence tomography (SD OCT), or other screening tests as determined by the eye specialist, including a multifocal electroretinogram (mfERG) or fundus auto-fluorescence (FAF).    We received a refill request from your pharmacy. A copy of your current eye exam was not found in your medical record. Your hydroxychloroquine prescription has been refilled with a limited supply pending confirmation you have had an eye exam to test for hydroxychloroquine toxicity.      We encourage you to bring this letter to your eye exam to discuss the exams that will be performed during your visit. Please request your eye clinic fax or mail a copy of your eye exam report to our clinic indicating that testing was completed for hydroxychloroquine toxicity screening. The exam notes must specifically comment on the potential for hydroxychloroquine toxicity and outline recommended follow-up.    If you have questions about hydroxychloroquine or the information in this letter, please call the clinic at [phone] or talk to your provider at your next office  visit.                        1    Eye Specialist Letter  Dear Eye Specialist,  To ensure safe use of Plaquenil (hydroxychloroquine), we are requesting your assistance in providing the following information. Please return this form to our clinic via mail or fax, or incorporate this information into your visit summary. Your note must indicate whether or not there is evidence of toxicity from Plaquenil use. For questions regarding this form, please call [phone].  Patient Name:   :             Date of Exam:    The following exams were completed during this visit in accordance with the American Academy of Ophthalmology recommendations (2016):  ? 10-2 automated visual field  ? Spectral-domain optical coherence tomography (SD OCT)  ? Multifocal electroretinogram (mfERG)  ? Fundus autofluorescence (FAF)  ? Other (please specify)  Please select from the following:  ? The findings from the above exams are not suggestive of toxicity from Plaquenil (hydroxychloroquine).  ? The findings from the above exams may suggest toxicity from Plaquenil (hydroxychloroquine).  Directly contact the clinic and fax this form.  Please provide additional guidance on whether or not the medication may be continued from your perspective:  Date of next recommended Plaquenil (hydroxychloroquine) screening eye exam:   ? 5 years  ? 1 year  ? 6 months  Other (please specify):   Eye Specialist Name (print):                                                                Date:  Eye Specialist Signature:

## 2023-01-04 RX ORDER — HYDROXYCHLOROQUINE SULFATE 200 MG/1
TABLET, FILM COATED ORAL
Qty: 90 TABLET | Refills: 0 | Status: SHIPPED | OUTPATIENT
Start: 2023-01-04 | End: 2023-05-03

## 2023-01-04 NOTE — TELEPHONE ENCOUNTER
Plaquenil      Last Written Prescription Date:  1/9/22  Last Fill Quantity: 90,   # refills: 3  Last Office Visit: 9/16/22  Future Office visit: 9/21/23        Date of last eye exam specifically commenting on HCQ toxicity: 12/30/2021  Clinic: [facility] Phone Number: [phone]  Ophthalmologist: Ramon Sousa            Not found - send letter or contact the patient in some way.             - Document not found and pend for 90+0 and route to provider    Routing refill request to provider/clinic team for review/approval because:  Last eye exam found ( media tab/ [facility]) dated 12/30/21

## 2023-01-05 ENCOUNTER — TELEPHONE (OUTPATIENT)
Dept: CARDIOLOGY | Facility: CLINIC | Age: 63
End: 2023-01-05

## 2023-01-05 ENCOUNTER — NURSE TRIAGE (OUTPATIENT)
Dept: CARDIOLOGY | Facility: CLINIC | Age: 63
End: 2023-01-05

## 2023-01-05 ENCOUNTER — TRANSFERRED RECORDS (OUTPATIENT)
Dept: HEALTH INFORMATION MANAGEMENT | Facility: CLINIC | Age: 63
End: 2023-01-05

## 2023-01-05 NOTE — TELEPHONE ENCOUNTER
"Amelia reports \"ruddiness\" in her legs and some swelling.  Her weight is up 5 lbs in 10 days.  She denies shortness of breath and is able to lay on her stomach and able to lay down to sleep.  She did not miss any medications and denies dietary indiscretions.      Confirmed her medications:    Jardiance 10 mg daily  Lasix 40 mg BID   Spironolactone 50 mg daily    Will review with provider.  Amelia did not feel this was an emergency and will wait until tomorrow.  She has CORE follow up on Monday.  She has already touched base with her PCP, who felt that she needed to talk to cardiology- she was less concerned with the idea of cellulitis.    "

## 2023-01-05 NOTE — TELEPHONE ENCOUNTER
"Received a call from the call center to discuss leg swelling.  Spoke to Amelia who says she has leg swelling and redness on the right lower ankle area she thought was cellulitis. She says she sent a picture to her PCP who did not think it was cellulitis but wanted her to contact cardiology about it.  She says the right leg is more swollen from the ankle to below the knee and has indentation when pressed into the tissue. She says the left leg has slight swelling that is noticeable in the ankle area and only has a \"little bit\" of indentation. She says she can wear shoes and socks, and ambulate without difficulty. She says she is not short of breath and can lay flat.She says her weight yesterday and today was 114 pounds. She says she is taking furosemide as ordered but feels like she is not urinating as much as she had been prior.    Advised a message will be sent to [place] cardiology for follow up.  1. ONSET: \"When did the swelling start?\" (e.g., minutes, hours, days)  2. LOCATION: \"What part of the leg is swollen?\" \"Are both legs swollen or just one leg?\" R>L  3. SEVERITY: \"How bad is the swelling?\" (e.g., localized; mild, moderate, severe) left ankle mild, right leg moderate  - Localized - small area of swelling localized to one leg  - MILD pedal edema - swelling limited to foot and ankle, pitting edema < 1/4 inch (6 mm) deep, rest and elevation eliminate most or all swelling  - MODERATE edema - swelling of lower leg to knee, pitting edema > 1/4 inch (6 mm) deep, rest and elevation only partially reduce swelling  - SEVERE edema - swelling extends above knee, facial or hand swelling present   4. REDNESS: \"Does the swelling look red or infected?\" right ankle redness  5. PAIN: \"Is the swelling painful to touch?\" If Yes, ask: \"How painful is it?\" (Scale 1-10; mild, moderate or severe)  6. FEVER: \"Do you have a fever?\" If Yes, ask: \"What is it, how was it measured, and when did it start?\"   7. CAUSE: \"What do you think " "is causing the leg swelling?\"  8. MEDICAL HISTORY: \"Do you have a history of heart failure, kidney disease, liver failure, or cancer?\" CHF  9. RECURRENT SYMPTOM: \"Have you had leg swelling before?\" If Yes, ask: \"When was the last time?\" \"What happened that time?\"  10. OTHER SYMPTOMS: \"Do you have any other symptoms?\" (e.g., chest pain, difficulty breathing) denies      Additional Information    Negative: SEVERE swelling (e.g., swelling extends above knee, entire leg is swollen, weeping fluid)    Negative: Difficulty breathing at rest    Negative: Can't walk or can barely stand (new-onset)    Protocols used: LEG SWELLING AND EDEMA-A-OH      "

## 2023-01-05 NOTE — TELEPHONE ENCOUNTER
[facility] Call Center    Phone Message    May a detailed message be left on voicemail: yes     Reason for Call: Symptoms or Concerns     If patient has red-flag symptoms, warm transfer to triage line    Current symptom or concern: Weight Gain   5 lbs over 10 days with Leg swelling    Symptoms have been present for:  10 day(s)    Has patient previously been seen for this? Yes    By : [facility] November [facility]    Are there any new or worsening symptoms? Yes: Increase      Action Taken: Other: Cardiology    Travel Screening: Not Applicable

## 2023-01-09 ENCOUNTER — OFFICE VISIT (OUTPATIENT)
Dept: CARDIOLOGY | Facility: CLINIC | Age: 63
End: 2023-01-09
Attending: NURSE PRACTITIONER
Payer: COMMERCIAL

## 2023-01-09 ENCOUNTER — LAB (OUTPATIENT)
Dept: LAB | Facility: CLINIC | Age: 63
End: 2023-01-09
Payer: COMMERCIAL

## 2023-01-09 VITALS
BODY MASS INDEX: 24.63 KG/M2 | HEART RATE: 127 BPM | SYSTOLIC BLOOD PRESSURE: 107 MMHG | OXYGEN SATURATION: 99 % | DIASTOLIC BLOOD PRESSURE: 77 MMHG | WEIGHT: 117.2 LBS

## 2023-01-09 DIAGNOSIS — I50.33 ACUTE ON CHRONIC DIASTOLIC HEART FAILURE (H): ICD-10-CM

## 2023-01-09 DIAGNOSIS — I50.22 CHRONIC SYSTOLIC HEART FAILURE (H): ICD-10-CM

## 2023-01-09 DIAGNOSIS — I50.9 CONGESTIVE HEART FAILURE, UNSPECIFIED HF CHRONICITY, UNSPECIFIED HEART FAILURE TYPE (H): ICD-10-CM

## 2023-01-09 LAB
ANION GAP SERPL CALCULATED.3IONS-SCNC: 13 MMOL/L (ref 7–15)
BUN SERPL-MCNC: 36.8 MG/DL (ref 8–23)
CALCIUM SERPL-MCNC: 9.9 MG/DL (ref 8.8–10.2)
CHLORIDE SERPL-SCNC: 89 MMOL/L (ref 98–107)
CREAT SERPL-MCNC: 1.35 MG/DL (ref 0.51–1.17)
DEPRECATED HCO3 PLAS-SCNC: 25 MMOL/L (ref 22–29)
GFR SERPL CREATININE-BSD FRML MDRD: 44 ML/MIN/1.73M2
GLUCOSE SERPL-MCNC: 127 MG/DL (ref 70–99)
POTASSIUM SERPL-SCNC: 4.5 MMOL/L (ref 3.4–5.3)
SODIUM SERPL-SCNC: 127 MMOL/L (ref 136–145)

## 2023-01-09 PROCEDURE — G0463 HOSPITAL OUTPT CLINIC VISIT: HCPCS | Performed by: NURSE PRACTITIONER

## 2023-01-09 PROCEDURE — 80048 BASIC METABOLIC PNL TOTAL CA: CPT | Performed by: PATHOLOGY

## 2023-01-09 PROCEDURE — 36415 COLL VENOUS BLD VENIPUNCTURE: CPT | Performed by: PATHOLOGY

## 2023-01-09 PROCEDURE — 99215 OFFICE O/P EST HI 40 MIN: CPT | Performed by: NURSE PRACTITIONER

## 2023-01-09 RX ORDER — SPIRONOLACTONE 25 MG/1
50 TABLET ORAL DAILY
Qty: 90 TABLET | Refills: 3 | Status: SHIPPED | OUTPATIENT
Start: 2023-01-09 | End: 2023-01-17

## 2023-01-09 NOTE — PATIENT INSTRUCTIONS
CORE: Cardiomyopathy, Optimization, Rehabilitation, Education      Take your medicines every day, as directed    Changes/Recommendations made today:  Increase Lasix to 60 mg (1.5 tablet) twice daily x 2 days, then on 1/11 resume lasix 40 mg twice daily     Monitor Your Weight and Symptoms    Contact us if you:    Gain 2 pounds in one day or 5 pounds in one week  Feel more short of breath  Notice more leg swelling  Feel lightheadeded   Change your lifestyle    Limit Salt or Sodium:  2000 mg  Limit Fluids:  2000 mL or approximately 64 ounces  Eat a Heart Healthy Diet  Low in saturated fats  Stay Active:  Aim to move at least 150 minutes every  week         To Contact us    During Business Hours:  [phone], option # 1      After hours, weekends or holidays:   [phone], Option #4  Ask to speak to the On-Call Cardiologist. Inform them you are a CORE/heart failure patient at the [facility].     Use [email or web address] allows you to communicate directly with your heart team through secure messaging.  [employer] can be accessed any time on your phone, computer, or tablet.  If you need assistance, we'd be happy to help!         Keep your Heart Appointments:    Labs in 1 week    CORE with [place] in 4-6 weeks    Will follow up with RN phone call at end of week

## 2023-01-09 NOTE — NURSING NOTE
Diet: Patient instructed regarding a heart failure healthy diet, including discussion of reduced fat and 2000 mg daily sodium restriction, daily weights, medication purpose and compliance, fluid restrictions and resources for patient and family to access for assistance with heart failure management.       Labs: Patient was given results of the laboratory testing obtained today and patient was instructed about when to return for the next laboratory testing.     Med Reconcile: Reviewed and verified all current medications with the patient. The updated medication list was printed and given to the patient.     Med changes: increase lasix to 60 mg BID for 2 days, back to 40 mg BID on Wednesday.      Return Appointment: Patient given instructions regarding scheduling next clinic visit: Call on Thursday, labs next week, follow up in 4-6 weeks    Patient stated she understood all health information given and agreed to call with further questions or concerns.     Kandy Molina RN

## 2023-01-09 NOTE — LETTER
1/9/2023      RE: Amelia Michel  [address]  [facility] [address]       Dear Colleague,    Thank you for the opportunity to participate in the care of your patient, Amelia Michel, at the [facility] [place] at [facility]. Please see a copy of my visit note below.      [facility]   [facility]      HPI:   Ms. 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 She was recently admitted 11/19/-12/5/22 with ADHF and frequent NSVT, now s/p VT ablation. She presents to [facility] for hospital follow up.     With regards to her cardiac conditions, as noted, Ms. Michel presented to [facility] on 11/19 with MONTALVO, BLE edema, and 8 pound weight gain over the course of one week. Chest X-ray was suspicious for pulmonary edema, NT-P BNP 1,913, Troponin T 21-->12. EDW per chart review 116-120; admission weight 128. She was admitted w/ decompensated diastolic heart failure with pictutre c/b frequent non sustained ventricular tachycardia with RHC on 11/28/22 notable for cardiogenic shock with a cardiac index of 1.2 and CO of 1.6. Etiology thought to most likely secondary to frequent non sustained ventricular tachycardia in the setting of possible restrictive/constrictive physiology given her malignancy history (albeit presumably in remission). CAD ruled on by coronary angiogram completed on 11/28/22. She underwent VT ablation on 12/1/22 and had successful resumption of her home medications with increased diuretic dosing and adddition of an SGLT2 inhibitor. She was diuresed 19 lbs to a  weight of 109 lbs at time of discharge.     At [facility] [place] [facility] follow up on 12/8/22, Ms. Michel was feeling well. She remained euvolemic and labs were stable. Discussed cardioMEMs and proceeded with insurance PA. On 1/5/22, Ms. Michel called reporting 5 lb weight gain in 10 days and increased peripheral edema with ruddiness. No shortness of breath, PND, orthopnea. Lasix was increase to 60 mg x 2 days in the morning, and continued 40 mg in the evening.     Today, Ms. Michel presents to [facility] [place] [facility] with her . Since increasing lasix to 60 mg over the weekend she has noticed an improvement in her peripheral edema, although she notes ongoing pedal edema and snug fitting shoes. Home weights 110-115 lbs, +3 lb in 24 hr. At last visit her home weight was around 106 lb. Thinks that a few pounds are due to increased appetite/caloric intake, however she also tells me that she feels volume up.   She is participating in PT for a shoulder injury. Continues to feel fatigued and deconditioned. Has the desire to do activities, but does not have the energy.  She has baseline MONTALVO, stable. No chest discomfort, SOB, palpitations, lightheadedness, orthopnea, PND, syncope, or presyncope. Remains on eliquis for CVA prophylaxis. Denies any bleeding episodes. Home BP mid to high 110s-120s/80s.      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     VSD (ventricular septal defect)        FAMILY HISTORY:  Family History   Problem Relation Age of Onset     Breast Cancer Mother         60s     Hypertension Mother      Alzheimer Disease Mother       Cerebrovascular Disease Mother      Hypertension Father      Cerebrovascular Disease Father      Atrial fibrillation Father      Lymphoma Father      Prostate Cancer Father      Alzheimer Disease Maternal Grandmother         likely     Arthritis Maternal Grandfather      Pneumonia Maternal Grandfather      Diabetes Paternal Grandmother        SOCIAL HISTORY:  Social History     Socioeconomic History     Marital status:      Spouse name: Romeo Michel     Number of children: 0   Occupational History     Employer: [employer]   Tobacco Use     Smoking status: Never     Smokeless tobacco: Never   Substance and Sexual Activity     Alcohol use: Not Currently     Comment: none     Drug use: No   Other Topics Concern     Parent/sibling w/ CABG, MI or angioplasty before 65F 55M? No     Social Determinants of Health     Intimate Partner Violence: Not At Risk     Fear of Current or Ex-Partner: No     Emotionally Abused: No     Physically Abused: No     Sexually Abused: No       CURRENT MEDICATIONS:  acetaminophen (TYLENOL) 500 MG tablet, Take 500 mg by mouth every 6 hours as needed for mild pain (Patient not taking: Reported on 12/19/2022)  alendronate (FOSAMAX) 70 MG tablet, TAKE 1 TABLET(70 MG) BY MOUTH EVERY 7 DAYS  apixaban ANTICOAGULANT (ELIQUIS ANTICOAGULANT) 5 MG tablet, Take 1 tablet (5 mg) by mouth 2 times daily  atenolol (TENORMIN) 50 MG tablet, Take 1 tablet (50 mg) by mouth 2 times daily  calcium carbonate 600 mg-vitamin D 400 units (CALTRATE) 600-400 MG-UNIT per tablet, Take 2 tablets by mouth daily  empagliflozin (JARDIANCE) 10 MG TABS tablet, Take 1 tablet (10 mg) by mouth daily for 360 days  furosemide (LASIX) 40 MG tablet, Take 1 tablet (40 mg) by mouth 2 times daily for 240 days  gabapentin (NEURONTIN) 100 MG capsule, Take 1 capsule AM + 1 capsule afternoon + 2 capsules at bedtime  hydroxychloroquine (PLAQUENIL) 200 MG tablet, Take 1 tablet (200 mg) by mouth daily  loratadine  (CLARITIN) 10 MG tablet, Take 10 mg by mouth daily  magnesium oxide 200 MG TABS, Take 200 mg by mouth daily bedtime  multivitamin, therapeutic with minerals (THERA-VIT-M) TABS tablet, Take 1 tablet by mouth daily  predniSONE (DELTASONE) 1 MG tablet, Take 3 tablets (3 mg) by mouth daily  spironolactone (ALDACTONE) 25 MG tablet, Take 2 tablets (50 mg) by mouth daily  STATIN NOT PRESCRIBED (INTENTIONAL), Please choose reason not prescribed, below (Patient not taking: Reported on 12/19/2022)    No current facility-administered medications on file prior to visit.      ROS:   Refer to HPI    EXAM:  There were no vitals taken for this visit.  GENERAL: Appears comfortable, in no acute distress.   HEENT: Eye symmetrical, no discharge or icterus bilaterally. Mucous membranes moist and without lesions.  CV: tachycardic, irregular, +S1S2, systolic murmur, no rub, or gallop. JVP at 10-12 cm   RESPIRATORY: Respirations regular, even, and unlabored. Lungs CTA throughout.   GI: Soft and non distended with normoactive bowel sounds present in all quadrants. No tenderness, rebound, guarding. No hepatomegaly.   EXTREMITIES: trace peripheral edema. 2+ bilateral pedal pulses.   NEUROLOGIC: Alert and oriented x 3. No focal deficits.   MUSCULOSKELETAL: No joint swelling or tenderness.   SKIN: No jaundice. No rashes or lesions.     Labs, reviewed with patient in clinic today:  CBC RESULTS:  Lab Results   Component Value Date    WBC 5.3 12/21/2022    WBC Canceled, Test credited 03/16/2021    RBC 4.33 12/21/2022    RBC Canceled, Test credited 03/16/2021    HGB 15.7 12/21/2022    HGB Canceled, Test credited 03/16/2021    HCT 44.6 12/21/2022    HCT Canceled, Test credited 03/16/2021     (H) 12/21/2022    MCV Canceled, Test credited 03/16/2021    MCH 36.3 (H) 12/21/2022    MCH Canceled, Test credited 03/16/2021    MCHC 35.2 12/21/2022    MCHC Canceled, Test credited 03/16/2021    RDW 15.6 (H) 12/21/2022    RDW Canceled, Test credited  03/16/2021     (L) 12/21/2022    PLT Canceled, Test credited 03/16/2021       CMP RESULTS:  Lab Results   Component Value Date     (L) 12/21/2022     (L) 11/29/2022     06/23/2021    POTASSIUM 3.9 12/21/2022    POTASSIUM 4.7 11/19/2022    POTASSIUM 3.9 07/20/2022    POTASSIUM 4.2 06/23/2021    CHLORIDE 89 (L) 12/21/2022    CHLORIDE 101 07/20/2022    CHLORIDE 102 06/23/2021    CO2 26 12/21/2022    CO2 28 07/20/2022    CO2 28 06/23/2021    ANIONGAP 13 12/21/2022    ANIONGAP 7 07/20/2022    ANIONGAP 6 06/23/2021     (H) 12/21/2022    GLC 92 12/01/2022     (H) 07/20/2022    GLC 98 06/23/2021    BUN 38.8 (H) 12/21/2022    BUN 31 (H) 07/20/2022    BUN 25 06/23/2021    CR 1.22 (H) 12/21/2022    CR 1.05 (H) 06/23/2021    GFRESTIMATED 50 (L) 12/21/2022    GFRESTIMATED 57 (L) 06/23/2021    GFRESTBLACK 67 06/23/2021    VIKTORIYA 9.8 12/21/2022    VIKTORIYA 10.0 06/23/2021    BILITOTAL 2.0 (H) 12/21/2022    BILITOTAL 1.2 06/23/2021    ALBUMIN 4.3 12/21/2022    ALBUMIN 4.4 04/20/2022    ALBUMIN 4.2 06/23/2021    ALKPHOS 132 (H) 12/21/2022    ALKPHOS 136 06/23/2021    ALT 39 12/21/2022    ALT 41 06/23/2021    AST 65 (H) 12/21/2022    AST 36 06/23/2021        INR RESULTS:  Lab Results   Component Value Date    INR 4.57 (H) 11/19/2022    INR 3.42 (H) 03/07/2018       Lab Results   Component Value Date    MAG 2.4 (H) 12/21/2022    MAG 1.9 04/12/2018     Lab Results   Component Value Date    NTBNPI 1,913 (H) 11/19/2022    NTBNPI 485 12/12/2019     Lab Results   Component Value Date    NTBNP 1,425 (H) 10/13/2021    NTBNP 1,274 (H) 05/26/2021       Cardiac Diagnostics:    12/5/2022 ECHO  Interpretation Summary  Severe tricuspid insufficiency is present.  Moderate right ventricular dilation is present. Global right ventricular  function is mildly reduced.  Global and regional left ventricular function is normal with an EF of 55-60%.  IVC diameter >2.1 cm collapsing <50% with sniff suggests a high RA  pressure  estimated at 15 mmHg or greater.  No pericardial effusion is present.  No significant changes noted.    11/28/2022 RHC with coronary angiogram     Right sided filling pressures are severely elevated.    Mild elevated pulmonary hypertension.    Left sided filling pressures are moderately elevated.    Reduced cardiac output level.    Hemodynamic data has been modified in Epic per physician review.    Prox LAD lesion is 30% stenosed.     Mild non-obstructive coronary artery disease        Assessment and Plan:   Ms. 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 She was recently admitted 11/19/-12/5/22 with ADHF and frequent NSVT, now s/p VT ablation. She presents to [facility] [place] [place] for hospital follow up.     Mildly hypervolemic on exam as evident by weight gain, tachycardia, and peripheral edema. Review of labs demonstrate persistent hyponatremia with Na 127 and bump in Cr to 1.35. Lasix was initially increased on 1/7 to 60/40 mg BID. Will increase further to 60 mg BID x 2 days and re-evaluate symptoms and weights by phone at end of week. She is presently on max dose of atenolol 50 mg BID for rate control. I suspect that her tachycardia is in light of extra volume. Will diurese for now,  however may be necessary to consider additional rate control agent in future. Still awaiting PA for cardiomems device.       # Acute on chronic diastolic heart failure with dilated and mildly reduced RV function (LVEF 55-60%)  Pre-Ablation: 11/28/22 RHC: CVP 24, PA 48/27/34, PCWP 23, SVO2 46, CI 1.1, CO 1.6  Post Ablation and Diuresis: 12/3/22 RHC: CVP 16, PA  41/22/28, PCWP 15, SVO2 69, CI 2.1, CO 3.0    NYHA Class III, AHA/ACC Stage C  Rate control: 127  volume status: hypervolemic, increase lasix to 60 mg BID x 3 days, EDW ~ 110 lb  Blood pressure control: Atenolol to 50 mg po BID.   Aldosterone antagonist: Aldactone 50 mg po daily   SGLT2:  Empagliflozin 10 mg daily   Evaluation of coronary arteries: 11/28/22 angiogram mild non-obstructive CAD     # Frequent, non-sustained ventricular tachycardia s/p VT Ablation (by Dr. Eaton on 12/1/22)  - Anti-arrhythmic: none, s/p VT ablation  - Stopped PTA digoxin per EP recommendations  - Anticoagulation: given history of Afib, continue Apixaban 5 mg bid  - Follow up with EP specialists (Dr. Eaton and/or Maria Esther Cutler NP)    # HTN Intolerant to Metoprolol in the past.   - Atenolol to 50 mg po BID.      # Aflutter. History of SSS s/p PPM 12/19. Long standing history of atrial arrythmias.  - Atenolol as above.   - Continue Eliquis.   - Follow up with EP as scheduled.      # VSD s/p repair.     # History of exudative pericardial effusion.   - Stable per CT 3/19.     # Severe TR per TTE 11/2022  Secondary to non coaptation of tricuspid valves. Seen on BRE from 11/22/22 and remained severe on repeat TTE on 12/5/22 despite achieving clinical euvolemia and presumed dry weight. Patient was evaluated by surgery during this admission (Dr. Cope) who thought she may be a candidate for surgical intervention however, Ms. Michel would like to hold off on pursuing surgical intervention given improvement in her symptoms with plan to re-assess in the future.         Follow up:  - Dr. Eaton 2/1/23  - CORE in 4-6 weeks  - CORE with TTE 5/4/23      Chart review time today: 10 minutes  Visit time today: 30 minutes  Total time spent today: 40 minutes        Elmira Vasquez DNP, NP-C  Advance Heart Failure  1/9/2023

## 2023-01-10 ENCOUNTER — TELEPHONE (OUTPATIENT)
Dept: RHEUMATOLOGY | Facility: CLINIC | Age: 63
End: 2023-01-10

## 2023-01-10 NOTE — TELEPHONE ENCOUNTER
Plaquenil Eye Exam    Date of last eye exam specifically commenting on HCQ toxicity: 1/5/2023  Clinic: [facility] Phone Number: [phone]  Ophthalmologist: Ramon Sousa  Toxicity: NO  Records scanned to chart: Yes      Jackie Beaver CMA   1/10/2023 2:32 PM

## 2023-01-12 ENCOUNTER — PATIENT OUTREACH (OUTPATIENT)
Dept: CARDIOLOGY | Facility: CLINIC | Age: 63
End: 2023-01-12
Payer: COMMERCIAL

## 2023-01-12 DIAGNOSIS — I50.33 ACUTE ON CHRONIC DIASTOLIC HEART FAILURE (H): ICD-10-CM

## 2023-01-12 NOTE — TELEPHONE ENCOUNTER
Checked in with Amelia following her 2 day increase of diuretics.      Her weight went down a total of 2 lbs over the two days she was on the higher dose but it's gone back up since decreasing back to her normal dose.  Swelling was the same- it improved and is now back.  Her shortness of breath did improve.      HRs are still up- in the 110's  O2 is stable  BPs are 120's/80- which is normal but higher than she likes it.      Date: 1/12/2023    Time of Call: 2:55 PM     Diagnosis:  Heart failure     [ VORB ] Ordering provider: Elmira Vasquez NP    Order: Lasix 60 mg BID through Monday after her lab appointment      Order received by: Kandy Molina RN       Follow-up/additional notes: reviewed with Amelia

## 2023-01-16 ENCOUNTER — LAB (OUTPATIENT)
Dept: LAB | Facility: CLINIC | Age: 63
End: 2023-01-16
Payer: COMMERCIAL

## 2023-01-16 DIAGNOSIS — I50.33 ACUTE ON CHRONIC DIASTOLIC CONGESTIVE HEART FAILURE (H): ICD-10-CM

## 2023-01-16 DIAGNOSIS — I50.9 CONGESTIVE HEART FAILURE, UNSPECIFIED HF CHRONICITY, UNSPECIFIED HEART FAILURE TYPE (H): ICD-10-CM

## 2023-01-16 LAB
ANION GAP SERPL CALCULATED.3IONS-SCNC: 15 MMOL/L (ref 7–15)
BUN SERPL-MCNC: 34.6 MG/DL (ref 8–23)
CALCIUM SERPL-MCNC: 9.5 MG/DL (ref 8.8–10.2)
CHLORIDE SERPL-SCNC: 91 MMOL/L (ref 98–107)
CREAT SERPL-MCNC: 1.6 MG/DL (ref 0.51–1.17)
DEPRECATED HCO3 PLAS-SCNC: 26 MMOL/L (ref 22–29)
GFR SERPL CREATININE-BSD FRML MDRD: 36 ML/MIN/1.73M2
GLUCOSE SERPL-MCNC: 94 MG/DL (ref 70–99)
POTASSIUM SERPL-SCNC: 4.2 MMOL/L (ref 3.4–5.3)
SODIUM SERPL-SCNC: 132 MMOL/L (ref 136–145)

## 2023-01-16 PROCEDURE — 80048 BASIC METABOLIC PNL TOTAL CA: CPT

## 2023-01-16 PROCEDURE — 36415 COLL VENOUS BLD VENIPUNCTURE: CPT

## 2023-01-16 NOTE — TELEPHONE ENCOUNTER
Amelia's weight went as low as 113.7 but is now back up to 116. States that swelling is about the same.  Her shortness of breath comes and goes with activity and feels appropriate to the activity.  Labs are in progress.  Has stayed on lasix 60 mg BID

## 2023-01-17 RX ORDER — SPIRONOLACTONE 25 MG/1
25 TABLET ORAL DAILY
Qty: 90 TABLET | Refills: 3 | Status: ON HOLD | OUTPATIENT
Start: 2023-01-17 | End: 2023-01-30

## 2023-01-17 NOTE — TELEPHONE ENCOUNTER
Date: 1/17/2023    Time of Call: 2:46 PM     Diagnosis:  Heart failure     [ VORB ] Ordering provider: Elmira Vasquez NP    Order: BMP in a week, lasix back to 40 mg BID, spironolactone 25 mg daily     Order received by: Kandy Molina RN       Follow-up/additional notes: Amelia stated understanding, she will make her own lab appointment.  She reports that her HR has been 110-120's.

## 2023-01-18 ENCOUNTER — OFFICE VISIT (OUTPATIENT)
Dept: DERMATOLOGY | Facility: CLINIC | Age: 63
End: 2023-01-18
Attending: NURSE PRACTITIONER
Payer: COMMERCIAL

## 2023-01-18 DIAGNOSIS — L82.1 SEBORRHEIC KERATOSIS: ICD-10-CM

## 2023-01-18 DIAGNOSIS — L81.4 LENTIGO: Primary | ICD-10-CM

## 2023-01-18 DIAGNOSIS — L82.1 STUCCO KERATOSES: ICD-10-CM

## 2023-01-18 DIAGNOSIS — D23.9 DERMAL NEVUS: ICD-10-CM

## 2023-01-18 DIAGNOSIS — D18.01 ANGIOMA OF SKIN: ICD-10-CM

## 2023-01-18 DIAGNOSIS — L82.0 INFLAMED SEBORRHEIC KERATOSIS: ICD-10-CM

## 2023-01-18 PROCEDURE — 99203 OFFICE O/P NEW LOW 30 MIN: CPT | Mod: 25 | Performed by: DERMATOLOGY

## 2023-01-18 PROCEDURE — 17110 DESTRUCTION B9 LES UP TO 14: CPT | Performed by: DERMATOLOGY

## 2023-01-18 ASSESSMENT — PAIN SCALES - GENERAL: PAINLEVEL: NO PAIN (0)

## 2023-01-18 NOTE — LETTER
1/18/2023         RE: Amelia Michel  [address]  [place] [address]        Dear Colleague,    Thank you for referring your patient, Amelia Michel, to the [facility]. Please see a copy of my visit note below.    Amelia Michel , a 62 year old year old adult patient, I was asked to see by EMELYN Lin for spots on skin.  Patient states this has been present for a while.  Patient reports the following symptoms:  Rough spot on left hand.   .  Patient reports the following previous treatments none.  Patient reports the following modifying factors none.  Associated symptoms: none.  Patient has no other skin complaints today.  Remainder of the HPI, Meds, PMH, Allergies, FH, and SH was reviewed in chart.      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     VSD (ventricular septal defect)        Past Surgical History:   Procedure Laterality Date     ANESTHESIA CARDIOVERSION N/A 5/17/2017    Procedure: ANESTHESIA CARDIOVERSION;  ANESTHESIA CARDIOVERSION;  Surgeon: GENERIC ANESTHESIA PROVIDER;  Location: [facility] OR     ANESTHESIA CARDIOVERSION  3/12/2018    Procedure: ANESTHESIA CARDIOVERSION;;  Surgeon: GENERIC ANESTHESIA PROVIDER;  Location: [facility] OR     ANESTHESIA CARDIOVERSION N/A 3/23/2018    Procedure: ANESTHESIA CARDIOVERSION;  Anesthesia Cardioversion ;  Surgeon: GENERIC ANESTHESIA PROVIDER;  Location: [facility] OR     ANESTHESIA CARDIOVERSION N/A 4/12/2018    Procedure: ANESTHESIA CARDIOVERSION;  Cardioversion;  Surgeon: GENERIC ANESTHESIA PROVIDER;  Location: [facility] OR     ARTHRODESIS WRIST  2000    Right wrist     BONE MARROW ASPIRATE &BIOPSY  7/12/2017          BONE MARROW BIOPSY W/ ASPIRATION  07/12/2017     CARDIOVERSION      5/17/17, 3/12/18, 3/23/18,  4/14/18,      COLONOSCOPY N/A 11/19/2019    Procedure: Colonoscopy, With Polypectomy And Biopsy;  Surgeon: Pj Vasques MD;  Location: [facility]     CV CORONARY ANGIOGRAM N/A 11/28/2022    Procedure: Coronary Angiogram;  Surgeon: Sundar Wood MD;  Location:  HEART CARDIAC CATH LAB     CV RIGHT HEART CATH MEASUREMENTS RECORDED N/A 11/28/2022    Procedure: Right Heart Catheterization;  Surgeon: Sundar Wood MD;  Location: [facility] CARDIAC CATH LAB     EP ABLATION PVC N/A 12/1/2022    Procedure: Ablation Premature Ventricular Contractions;  Surgeon: Juan Eaton MD;  Location:  HEART CARDIAC CATH LAB     EP PACEMAKER N/A 12/13/2019    Procedure: EP Pacemaker;  Surgeon: Pj Trent MD;  Location: [facility] CARDIAC CATH LAB     EXCISE LESION UPPER EXTREMITY Left 5/22/2018    Procedure: EXCISE LESION UPPER EXTREMITY;  Left Arm Wound Excision And Closure, Possible Submuscular Transposition of Ulnar Nerve  (Choice Anesthesia);  Surgeon: Kevin Sheldon MD;  Location:  OR     FOOT SURGERY      4 left and 2 right     FOOT SURGERY      4 Left and 2 Right      IMPLANT PACEMAKER  12/13/2019    Dr China Trent  [facility] Cardiac Cath lab      IMPLANT PACEMAKER       RELEASE CARPAL TUNNEL Bilateral      RELEASE CARPAL TUNNEL BILATERAL       REPAIR VENTRICULAR SEPTAL DEFECT  1969     TRANSPOSITION ULNAR NERVE (ELBOW) Left 5/22/2018    Procedure: TRANSPOSITION ULNAR NERVE (ELBOW);;  Surgeon: Kevin Sheldon MD;  Location:  OR     ULNAR NERVE TRANSPOSITION Left 05/22/2018     VSD REPAIR  1969     ZZC FUSION FOOT BONES,SUBTALAR Right 10/20/2020    Procedure: RIGHT SUBTALAR JOINT FUSION AND CALCANEOCUBOID FUSION;  Surgeon: Nemesio Crow MD;  Location: [facility] OR;  Service: Orthopedics        Family History   Problem Relation Age of Onset     Breast Cancer Mother         60s     Hypertension Mother      Alzheimer Disease Mother      Cerebrovascular Disease Mother      Hypertension Father       Cerebrovascular Disease Father      Atrial fibrillation Father      Lymphoma Father      Prostate Cancer Father      Alzheimer Disease Maternal Grandmother         likely     Arthritis Maternal Grandfather      Pneumonia Maternal Grandfather      Diabetes Paternal Grandmother        Social History     Socioeconomic History     Marital status:      Spouse name: Romeo Michel     Number of children: 0     Years of education: Not on file     Highest education level: Not on file   Occupational History     Employer: [employer]   Tobacco Use     Smoking status: Never     Smokeless tobacco: Never   Substance and Sexual Activity     Alcohol use: Not Currently     Comment: none     Drug use: No     Sexual activity: Not on file   Other Topics Concern     Parent/sibling w/ CABG, MI or angioplasty before 65F 55M? No   Social History Narrative     Not on file     Social Determinants of Health     Financial Resource Strain: Not on file   Food Insecurity: Not on file   Transportation Needs: Not on file   Physical Activity: Not on file   Stress: Not on file   Social Connections: Not on file   Intimate Partner Violence: Not At Risk     Fear of Current or Ex-Partner: No     Emotionally Abused: No     Physically Abused: No     Sexually Abused: No   Housing Stability: Not on file       Outpatient Encounter Medications as of 1/18/2023   Medication Sig Dispense Refill     acetaminophen (TYLENOL) 500 MG tablet Take 500 mg by mouth every 6 hours as needed for mild pain       alendronate (FOSAMAX) 70 MG tablet TAKE 1 TABLET(70 MG) BY MOUTH EVERY 7 DAYS 12 tablet 3     apixaban ANTICOAGULANT (ELIQUIS ANTICOAGULANT) 5 MG tablet Take 1 tablet (5 mg) by mouth 2 times daily 180 tablet 3     atenolol (TENORMIN) 50 MG tablet Take 1 tablet (50 mg) by mouth 2 times daily 180 tablet 3     calcium carbonate 600 mg-vitamin D 400 units (CALTRATE) 600-400 MG-UNIT per tablet Take 2 tablets by mouth daily        empagliflozin (JARDIANCE) 10 MG TABS tablet Take 1 tablet (10 mg) by mouth daily for 360 days 90 tablet 3     furosemide (LASIX) 40 MG tablet Take 1 tablet (40 mg) by mouth 2 times daily for 240 days 120 tablet 3     gabapentin (NEURONTIN) 100 MG capsule Take 1 capsule AM + 1 capsule afternoon + 2 capsules at bedtime 360 capsule 1     hydroxychloroquine (PLAQUENIL) 200 MG tablet TAKE 1 TABLET(200 MG) BY MOUTH DAILY 90 tablet 0     loratadine (CLARITIN) 10 MG tablet Take 10 mg by mouth daily       magnesium oxide 200 MG TABS Take 200 mg by mouth daily bedtime       multivitamin, therapeutic with minerals (THERA-VIT-M) TABS tablet Take 1 tablet by mouth daily 30 each 0     predniSONE (DELTASONE) 1 MG tablet Take 3 tablets (3 mg) by mouth daily 270 tablet 3     spironolactone (ALDACTONE) 25 MG tablet Take 1 tablet (25 mg) by mouth daily 90 tablet 3     STATIN NOT PRESCRIBED (INTENTIONAL) Please choose reason not prescribed, below (Patient not taking: Reported on 12/19/2022)       No facility-administered encounter medications on file as of 1/18/2023.             Review Of Systems  Skin: As above  Eyes: negative  Ears/Nose/Throat: negative  Respiratory: No shortness of breath, dyspnea on exertion, cough, or hemoptysis  Cardiovascular: negative  Gastrointestinal: negative  Genitourinary: negative  Musculoskeletal: negative  Neurologic: negative  Psychiatric: negative  Hematologic/Lymphatic/Immunologic: negative  Endocrine: negative      O:   NAD, WDWN, Alert & Oriented, Mood & Affect wnl, Vitals stable   Here today alone   General appearance joleen ii   Vitals stable   Alert, oriented and in no acute distress   L hand inflamed seborrheic keratosis    Stuck on papules and brown macules on trunk and ext    Red papules on trunk   Flesh colored papules on trunk      The remainder of the full exam was normal; the following areas were examined:  conjunctiva/lids, oral mucosa, neck, peripheral vascular system, abdomen, lymph  nodes, digits/nails, eccrine and apocrine glands, scalp/hair, face, neck, chest, abdomen, buttocks, back, RUE, LUE, RLE, LLE       Eyes: Conjunctivae/lids:Normal     ENT: Lips, buccal mucosa, tongue: normal    MSK:Normal    Cardiovascular: peripheral edema 1+    Pulm: Breathing Normal    Lymph Nodes: No Head and Neck Lymphadenopathy     Neuro/Psych: Orientation:Normal; Mood/Affect:Normal      A/P:  1. Seborrheic keratosis, lentigo, angioma, dermal nevus  2. l hand inflamed seborrheic keratosis   LN2:  Treated with LN2 for 5s for 1-2 cycles. Warned risks of blistering, pain, pigment change, scarring, and incomplete resolution.  Advised patient to return if lesions do not completely resolve.  Wound care sheet given.    It was a pleasure speaking to Amelia Michel today.  Previous clinic  notes and pertinent laboratory tests were reviewed prior to Amelia Michel's visit.  Nature of benign skin lesions dicussed with patient.  Signs and Symptoms of skin cancer discussed with patient.  Patient encouraged to perform monthly skin exams.  UV precautions reviewed with patient.  Risks of non-melanoma skin cancer discussed with patient   Return to clinic 12 months        Again, thank you for allowing me to participate in the care of your patient.        Sincerely,        Christian Cameron MD

## 2023-01-18 NOTE — PROGRESS NOTES
- Updated glasses rx given today Amelia Michel , a 62 year old year old adult patient, I was asked to see by EMELYN Lin for spots on skin.  Patient states this has been present for a while.  Patient reports the following symptoms:  Rough spot on left hand.   .  Patient reports the following previous treatments none.  Patient reports the following modifying factors none.  Associated symptoms: none.  Patient has no other skin complaints today.  Remainder of the HPI, Meds, PMH, Allergies, FH, and SH was reviewed in chart.      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     VSD (ventricular septal defect)        Past Surgical History:   Procedure Laterality Date     ANESTHESIA CARDIOVERSION N/A 5/17/2017    Procedure: ANESTHESIA CARDIOVERSION;  ANESTHESIA CARDIOVERSION;  Surgeon: GENERIC ANESTHESIA PROVIDER;  Location: [facility] OR     ANESTHESIA CARDIOVERSION  3/12/2018    Procedure: ANESTHESIA CARDIOVERSION;;  Surgeon: GENERIC ANESTHESIA PROVIDER;  Location: [facility] OR     ANESTHESIA CARDIOVERSION N/A 3/23/2018    Procedure: ANESTHESIA CARDIOVERSION;  Anesthesia Cardioversion ;  Surgeon: GENERIC ANESTHESIA PROVIDER;  Location: [facility] OR     ANESTHESIA CARDIOVERSION N/A 4/12/2018    Procedure: ANESTHESIA CARDIOVERSION;  Cardioversion;  Surgeon: GENERIC ANESTHESIA PROVIDER;  Location: [facility] OR     ARTHRODESIS WRIST  2000    Right wrist     BONE MARROW ASPIRATE &BIOPSY  7/12/2017          BONE MARROW BIOPSY W/ ASPIRATION  07/12/2017     CARDIOVERSION      5/17/17, 3/12/18, 3/23/18, 4/14/18,      COLONOSCOPY N/A 11/19/2019    Procedure: Colonoscopy, With Polypectomy And Biopsy;  Surgeon: Pj Vasques MD;  Location: [facility]     CV CORONARY ANGIOGRAM N/A 11/28/2022    Procedure: Coronary Angiogram;  Surgeon: Sundar Wood MD;   Location:  HEART CARDIAC CATH LAB     CV RIGHT HEART CATH MEASUREMENTS RECORDED N/A 11/28/2022    Procedure: Right Heart Catheterization;  Surgeon: Sundar Wood MD;  Location:  HEART CARDIAC CATH LAB     EP ABLATION PVC N/A 12/1/2022    Procedure: Ablation Premature Ventricular Contractions;  Surgeon: Juan Eaton MD;  Location:  HEART CARDIAC CATH LAB     EP PACEMAKER N/A 12/13/2019    Procedure: EP Pacemaker;  Surgeon: Pj Trent MD;  Location: [facility] CARDIAC CATH LAB     EXCISE LESION UPPER EXTREMITY Left 5/22/2018    Procedure: EXCISE LESION UPPER EXTREMITY;  Left Arm Wound Excision And Closure, Possible Submuscular Transposition of Ulnar Nerve  (Choice Anesthesia);  Surgeon: Kevin Sheldon MD;  Location:  OR     FOOT SURGERY      4 left and 2 right     FOOT SURGERY      4 Left and 2 Right      IMPLANT PACEMAKER  12/13/2019    Dr China Trent  [facility] Cardiac Cath lab      IMPLANT PACEMAKER       RELEASE CARPAL TUNNEL Bilateral      RELEASE CARPAL TUNNEL BILATERAL       REPAIR VENTRICULAR SEPTAL DEFECT  1969     TRANSPOSITION ULNAR NERVE (ELBOW) Left 5/22/2018    Procedure: TRANSPOSITION ULNAR NERVE (ELBOW);;  Surgeon: Kevin Sheldon MD;  Location:  OR     ULNAR NERVE TRANSPOSITION Left 05/22/2018     VSD REPAIR  1969     ZZC FUSION FOOT BONES,SUBTALAR Right 10/20/2020    Procedure: RIGHT SUBTALAR JOINT FUSION AND CALCANEOCUBOID FUSION;  Surgeon: Nemseio Crow MD;  Location: [facility] OR;  Service: Orthopedics        Family History   Problem Relation Age of Onset     Breast Cancer Mother         60s     Hypertension Mother      Alzheimer Disease Mother      Cerebrovascular Disease Mother      Hypertension Father      Cerebrovascular Disease Father      Atrial fibrillation Father      Lymphoma Father      Prostate Cancer Father      Alzheimer Disease Maternal Grandmother         likely     Arthritis Maternal Grandfather      Pneumonia Maternal Grandfather       Diabetes Paternal Grandmother        Social History     Socioeconomic History     Marital status:      Spouse name: Romeo Michel     Number of children: 0     Years of education: Not on file     Highest education level: Not on file   Occupational History     Employer: [employer]   Tobacco Use     Smoking status: Never     Smokeless tobacco: Never   Substance and Sexual Activity     Alcohol use: Not Currently     Comment: none     Drug use: No     Sexual activity: Not on file   Other Topics Concern     Parent/sibling w/ CABG, MI or angioplasty before 65F 55M? No   Social History Narrative     Not on file     Social Determinants of Health     Financial Resource Strain: Not on file   Food Insecurity: Not on file   Transportation Needs: Not on file   Physical Activity: Not on file   Stress: Not on file   Social Connections: Not on file   Intimate Partner Violence: Not At Risk     Fear of Current or Ex-Partner: No     Emotionally Abused: No     Physically Abused: No     Sexually Abused: No   Housing Stability: Not on file       Outpatient Encounter Medications as of 1/18/2023   Medication Sig Dispense Refill     acetaminophen (TYLENOL) 500 MG tablet Take 500 mg by mouth every 6 hours as needed for mild pain       alendronate (FOSAMAX) 70 MG tablet TAKE 1 TABLET(70 MG) BY MOUTH EVERY 7 DAYS 12 tablet 3     apixaban ANTICOAGULANT (ELIQUIS ANTICOAGULANT) 5 MG tablet Take 1 tablet (5 mg) by mouth 2 times daily 180 tablet 3     atenolol (TENORMIN) 50 MG tablet Take 1 tablet (50 mg) by mouth 2 times daily 180 tablet 3     calcium carbonate 600 mg-vitamin D 400 units (CALTRATE) 600-400 MG-UNIT per tablet Take 2 tablets by mouth daily       empagliflozin (JARDIANCE) 10 MG TABS tablet Take 1 tablet (10 mg) by mouth daily for 360 days 90 tablet 3     furosemide (LASIX) 40 MG tablet Take 1 tablet (40 mg) by mouth 2 times daily for 240 days 120 tablet 3     gabapentin (NEURONTIN) 100 MG capsule Take 1  capsule AM + 1 capsule afternoon + 2 capsules at bedtime 360 capsule 1     hydroxychloroquine (PLAQUENIL) 200 MG tablet TAKE 1 TABLET(200 MG) BY MOUTH DAILY 90 tablet 0     loratadine (CLARITIN) 10 MG tablet Take 10 mg by mouth daily       magnesium oxide 200 MG TABS Take 200 mg by mouth daily bedtime       multivitamin, therapeutic with minerals (THERA-VIT-M) TABS tablet Take 1 tablet by mouth daily 30 each 0     predniSONE (DELTASONE) 1 MG tablet Take 3 tablets (3 mg) by mouth daily 270 tablet 3     spironolactone (ALDACTONE) 25 MG tablet Take 1 tablet (25 mg) by mouth daily 90 tablet 3     STATIN NOT PRESCRIBED (INTENTIONAL) Please choose reason not prescribed, below (Patient not taking: Reported on 12/19/2022)       No facility-administered encounter medications on file as of 1/18/2023.             Review Of Systems  Skin: As above  Eyes: negative  Ears/Nose/Throat: negative  Respiratory: No shortness of breath, dyspnea on exertion, cough, or hemoptysis  Cardiovascular: negative  Gastrointestinal: negative  Genitourinary: negative  Musculoskeletal: negative  Neurologic: negative  Psychiatric: negative  Hematologic/Lymphatic/Immunologic: negative  Endocrine: negative      O:   NAD, WDWN, Alert & Oriented, Mood & Affect wnl, Vitals stable   Here today alone   General appearance joleen ii   Vitals stable   Alert, oriented and in no acute distress   L hand inflamed seborrheic keratosis    Stuck on papules and brown macules on trunk and ext    Red papules on trunk   Flesh colored papules on trunk      The remainder of the full exam was normal; the following areas were examined:  conjunctiva/lids, oral mucosa, neck, peripheral vascular system, abdomen, lymph nodes, digits/nails, eccrine and apocrine glands, scalp/hair, face, neck, chest, abdomen, buttocks, back, RUE, LUE, RLE, LLE       Eyes: Conjunctivae/lids:Normal     ENT: Lips, buccal mucosa, tongue: normal    MSK:Normal    Cardiovascular: peripheral edema  1+    Pulm: Breathing Normal    Lymph Nodes: No Head and Neck Lymphadenopathy     Neuro/Psych: Orientation:Normal; Mood/Affect:Normal      A/P:  1. Seborrheic keratosis, lentigo, angioma, dermal nevus  2. l hand inflamed seborrheic keratosis   LN2:  Treated with LN2 for 5s for 1-2 cycles. Warned risks of blistering, pain, pigment change, scarring, and incomplete resolution.  Advised patient to return if lesions do not completely resolve.  Wound care sheet given.    It was a pleasure speaking to Amelia Michel today.  Previous clinic  notes and pertinent laboratory tests were reviewed prior to Amelia Michel's visit.  Nature of benign skin lesions dicussed with patient.  Signs and Symptoms of skin cancer discussed with patient.  Patient encouraged to perform monthly skin exams.  UV precautions reviewed with patient.  Risks of non-melanoma skin cancer discussed with patient   Return to clinic 12 months

## 2023-01-24 ENCOUNTER — TELEPHONE (OUTPATIENT)
Dept: CARDIOLOGY | Facility: CLINIC | Age: 63
End: 2023-01-24

## 2023-01-24 ENCOUNTER — APPOINTMENT (OUTPATIENT)
Dept: GENERAL RADIOLOGY | Facility: CLINIC | Age: 63
DRG: 291 | End: 2023-01-24
Attending: EMERGENCY MEDICINE
Payer: COMMERCIAL

## 2023-01-24 ENCOUNTER — MYC MEDICAL ADVICE (OUTPATIENT)
Dept: CARDIOLOGY | Facility: CLINIC | Age: 63
End: 2023-01-24
Payer: COMMERCIAL

## 2023-01-24 ENCOUNTER — NURSE TRIAGE (OUTPATIENT)
Dept: CARDIOLOGY | Facility: CLINIC | Age: 63
End: 2023-01-24
Payer: COMMERCIAL

## 2023-01-24 ENCOUNTER — HOSPITAL ENCOUNTER (INPATIENT)
Facility: CLINIC | Age: 63
LOS: 6 days | Discharge: HOME | DRG: 291 | End: 2023-01-30
Attending: EMERGENCY MEDICINE | Admitting: INTERNAL MEDICINE
Payer: COMMERCIAL

## 2023-01-24 DIAGNOSIS — R60.9 EDEMA, UNSPECIFIED TYPE: ICD-10-CM

## 2023-01-24 DIAGNOSIS — M81.8 STEROID-INDUCED OSTEOPOROSIS: ICD-10-CM

## 2023-01-24 DIAGNOSIS — T38.0X5A STEROID-INDUCED OSTEOPOROSIS: ICD-10-CM

## 2023-01-24 DIAGNOSIS — I50.33 ACUTE ON CHRONIC DIASTOLIC HEART FAILURE (H): ICD-10-CM

## 2023-01-24 DIAGNOSIS — Z11.52 ENCOUNTER FOR SCREENING LABORATORY TESTING FOR SEVERE ACUTE RESPIRATORY SYNDROME CORONAVIRUS 2 (SARS-COV-2): ICD-10-CM

## 2023-01-24 DIAGNOSIS — N18.30 STAGE 3 CHRONIC KIDNEY DISEASE, UNSPECIFIED WHETHER STAGE 3A OR 3B CKD (H): ICD-10-CM

## 2023-01-24 DIAGNOSIS — E87.70 HYPERVOLEMIA, UNSPECIFIED HYPERVOLEMIA TYPE: ICD-10-CM

## 2023-01-24 DIAGNOSIS — I50.31 ACUTE HEART FAILURE WITH PRESERVED EJECTION FRACTION (HFPEF) (H): ICD-10-CM

## 2023-01-24 DIAGNOSIS — I47.19 ATRIAL TACHYCARDIA (H): Primary | ICD-10-CM

## 2023-01-24 DIAGNOSIS — I50.9 CHRONIC CONGESTIVE HEART FAILURE, UNSPECIFIED HEART FAILURE TYPE (H): ICD-10-CM

## 2023-01-24 LAB
ANION GAP SERPL CALCULATED.3IONS-SCNC: 16 MMOL/L (ref 7–15)
ATRIAL RATE - MUSE: 79 BPM
BASOPHILS # BLD AUTO: 0 10E3/UL (ref 0–0.2)
BASOPHILS NFR BLD AUTO: 1 %
BUN SERPL-MCNC: 38.2 MG/DL (ref 8–23)
CALCIUM SERPL-MCNC: 9.2 MG/DL (ref 8.8–10.2)
CHLORIDE SERPL-SCNC: 89 MMOL/L (ref 98–107)
CREAT SERPL-MCNC: 1.14 MG/DL (ref 0.51–1.17)
DEPRECATED HCO3 PLAS-SCNC: 21 MMOL/L (ref 22–29)
DIASTOLIC BLOOD PRESSURE - MUSE: NORMAL MMHG
EOSINOPHIL # BLD AUTO: 0 10E3/UL (ref 0–0.7)
EOSINOPHIL NFR BLD AUTO: 1 %
ERYTHROCYTE [DISTWIDTH] IN BLOOD BY AUTOMATED COUNT: 17 % (ref 10–15)
GFR SERPL CREATININE-BSD FRML MDRD: 54 ML/MIN/1.73M2
GLUCOSE SERPL-MCNC: 113 MG/DL (ref 70–99)
HCT VFR BLD AUTO: 43 % (ref 35–53)
HGB BLD-MCNC: 14.7 G/DL (ref 11.7–17.7)
IMM GRANULOCYTES # BLD: 0 10E3/UL
IMM GRANULOCYTES NFR BLD: 1 %
INTERPRETATION ECG - MUSE: NORMAL
LYMPHOCYTES # BLD AUTO: 0.5 10E3/UL (ref 0.8–5.3)
LYMPHOCYTES NFR BLD AUTO: 9 %
MCH RBC QN AUTO: 36.6 PG (ref 26.5–33)
MCHC RBC AUTO-ENTMCNC: 34.2 G/DL (ref 31.5–36.5)
MCV RBC AUTO: 107 FL (ref 78–100)
MONOCYTES # BLD AUTO: 0.5 10E3/UL (ref 0–1.3)
MONOCYTES NFR BLD AUTO: 9 %
NEUTROPHILS # BLD AUTO: 4.5 10E3/UL (ref 1.6–8.3)
NEUTROPHILS NFR BLD AUTO: 79 %
NRBC # BLD AUTO: 0 10E3/UL
NRBC BLD AUTO-RTO: 0 /100
NT-PROBNP SERPL-MCNC: 2067 PG/ML (ref 0–900)
P AXIS - MUSE: NORMAL DEGREES
PLATELET # BLD AUTO: 91 10E3/UL (ref 150–450)
POTASSIUM SERPL-SCNC: 4 MMOL/L (ref 3.4–5.3)
PR INTERVAL - MUSE: NORMAL MS
QRS DURATION - MUSE: 90 MS
QT - MUSE: 352 MS
QTC - MUSE: 478 MS
R AXIS - MUSE: 130 DEGREES
RBC # BLD AUTO: 4.02 10E6/UL (ref 3.8–5.9)
SARS-COV-2 RNA RESP QL NAA+PROBE: NEGATIVE
SODIUM SERPL-SCNC: 126 MMOL/L (ref 136–145)
SYSTOLIC BLOOD PRESSURE - MUSE: NORMAL MMHG
T AXIS - MUSE: 254 DEGREES
VENTRICULAR RATE- MUSE: 111 BPM
WBC # BLD AUTO: 5.5 10E3/UL (ref 4–11)

## 2023-01-24 PROCEDURE — C9803 HOPD COVID-19 SPEC COLLECT: HCPCS | Performed by: EMERGENCY MEDICINE

## 2023-01-24 PROCEDURE — 99285 EMERGENCY DEPT VISIT HI MDM: CPT | Mod: 25 | Performed by: EMERGENCY MEDICINE

## 2023-01-24 PROCEDURE — 71046 X-RAY EXAM CHEST 2 VIEWS: CPT

## 2023-01-24 PROCEDURE — 80048 BASIC METABOLIC PNL TOTAL CA: CPT | Performed by: EMERGENCY MEDICINE

## 2023-01-24 PROCEDURE — 93005 ELECTROCARDIOGRAM TRACING: CPT | Performed by: EMERGENCY MEDICINE

## 2023-01-24 PROCEDURE — 83880 ASSAY OF NATRIURETIC PEPTIDE: CPT | Performed by: EMERGENCY MEDICINE

## 2023-01-24 PROCEDURE — 85025 COMPLETE CBC W/AUTO DIFF WBC: CPT | Performed by: EMERGENCY MEDICINE

## 2023-01-24 PROCEDURE — 214N000001 HC R&B CCU UMMC

## 2023-01-24 PROCEDURE — 99223 1ST HOSP IP/OBS HIGH 75: CPT | Mod: 24 | Performed by: INTERNAL MEDICINE

## 2023-01-24 PROCEDURE — 93010 ELECTROCARDIOGRAM REPORT: CPT | Performed by: EMERGENCY MEDICINE

## 2023-01-24 PROCEDURE — 250N000011 HC RX IP 250 OP 636: Performed by: EMERGENCY MEDICINE

## 2023-01-24 PROCEDURE — 36415 COLL VENOUS BLD VENIPUNCTURE: CPT | Performed by: EMERGENCY MEDICINE

## 2023-01-24 PROCEDURE — 71046 X-RAY EXAM CHEST 2 VIEWS: CPT | Mod: 26 | Performed by: RADIOLOGY

## 2023-01-24 PROCEDURE — U0003 INFECTIOUS AGENT DETECTION BY NUCLEIC ACID (DNA OR RNA); SEVERE ACUTE RESPIRATORY SYNDROME CORONAVIRUS 2 (SARS-COV-2) (CORONAVIRUS DISEASE [COVID-19]), AMPLIFIED PROBE TECHNIQUE, MAKING USE OF HIGH THROUGHPUT TECHNOLOGIES AS DESCRIBED BY CMS-2020-01-R: HCPCS

## 2023-01-24 RX ORDER — FUROSEMIDE 10 MG/ML
40 INJECTION INTRAMUSCULAR; INTRAVENOUS ONCE
Status: COMPLETED | OUTPATIENT
Start: 2023-01-24 | End: 2023-01-24

## 2023-01-24 RX ORDER — LORATADINE 10 MG/1
10 TABLET ORAL DAILY
Status: DISCONTINUED | OUTPATIENT
Start: 2023-01-25 | End: 2023-01-30 | Stop reason: HOSPADM

## 2023-01-24 RX ORDER — MULTIPLE VITAMINS W/ MINERALS TAB 9MG-400MCG
1 TAB ORAL DAILY
Status: DISCONTINUED | OUTPATIENT
Start: 2023-01-25 | End: 2023-01-30 | Stop reason: HOSPADM

## 2023-01-24 RX ORDER — GABAPENTIN 100 MG/1
100 CAPSULE ORAL 2 TIMES DAILY
Status: DISCONTINUED | OUTPATIENT
Start: 2023-01-25 | End: 2023-01-30 | Stop reason: HOSPADM

## 2023-01-24 RX ORDER — HYDROXYCHLOROQUINE SULFATE 200 MG/1
200 TABLET, FILM COATED ORAL DAILY
Status: DISCONTINUED | OUTPATIENT
Start: 2023-01-25 | End: 2023-01-30 | Stop reason: HOSPADM

## 2023-01-24 RX ORDER — ALENDRONATE SODIUM 70 MG/1
70 TABLET ORAL
Status: DISCONTINUED | OUTPATIENT
Start: 2023-01-24 | End: 2023-01-24

## 2023-01-24 RX ORDER — SPIRONOLACTONE 25 MG/1
25 TABLET ORAL DAILY
Status: DISCONTINUED | OUTPATIENT
Start: 2023-01-25 | End: 2023-01-30

## 2023-01-24 RX ORDER — LIDOCAINE 40 MG/G
CREAM TOPICAL
Status: DISCONTINUED | OUTPATIENT
Start: 2023-01-24 | End: 2023-01-30 | Stop reason: HOSPADM

## 2023-01-24 RX ORDER — GABAPENTIN 100 MG/1
200 CAPSULE ORAL AT BEDTIME
Status: DISCONTINUED | OUTPATIENT
Start: 2023-01-24 | End: 2023-01-30 | Stop reason: HOSPADM

## 2023-01-24 RX ORDER — ATENOLOL 50 MG/1
50 TABLET ORAL 2 TIMES DAILY
Status: DISCONTINUED | OUTPATIENT
Start: 2023-01-24 | End: 2023-01-25

## 2023-01-24 RX ORDER — MAGNESIUM HYDROXIDE/ALUMINUM HYDROXICE/SIMETHICONE 120; 1200; 1200 MG/30ML; MG/30ML; MG/30ML
30 SUSPENSION ORAL EVERY 4 HOURS PRN
Status: DISCONTINUED | OUTPATIENT
Start: 2023-01-24 | End: 2023-01-30 | Stop reason: HOSPADM

## 2023-01-24 RX ORDER — ACETAMINOPHEN 650 MG/1
650 SUPPOSITORY RECTAL EVERY 4 HOURS PRN
Status: DISCONTINUED | OUTPATIENT
Start: 2023-01-24 | End: 2023-01-30 | Stop reason: HOSPADM

## 2023-01-24 RX ORDER — ACETAMINOPHEN 325 MG/1
650 TABLET ORAL EVERY 4 HOURS PRN
Status: DISCONTINUED | OUTPATIENT
Start: 2023-01-24 | End: 2023-01-30 | Stop reason: HOSPADM

## 2023-01-24 RX ORDER — FUROSEMIDE 10 MG/ML
40 INJECTION INTRAMUSCULAR; INTRAVENOUS ONCE
Status: COMPLETED | OUTPATIENT
Start: 2023-01-25 | End: 2023-01-25

## 2023-01-24 RX ADMIN — FUROSEMIDE 40 MG: 10 INJECTION, SOLUTION INTRAVENOUS at 20:28

## 2023-01-24 ASSESSMENT — ACTIVITIES OF DAILY LIVING (ADL)
ADLS_ACUITY_SCORE: 35

## 2023-01-24 NOTE — ED PROVIDER NOTES
ED Provider Note  [facility]      History     Chief Complaint   Patient presents with     Edema     The history is provided by the patient and medical records.     Amelia Michel is a 62 year old adult who HFpEF, mildly dilated and reduced RV function, severe TR (2/2 failure of leaflet coaptation), atrial flutter/fibrillation (on apixaban and rate control strategy), cardiac arrest (2017 likely 2/2 malignant involvement of the pericardium c/b organizing pericardial effusion), SSS s/p ppm (2019), VSD (s/p repair 1969), frequent nonsustained ventricular tachycardia s/p VT ablation 12/1/2022, HTN, HLD, and DLBCL (s/p CHOP therapy and in remission), lymphedema, CKD stage III who presents to the ED for evaluation of lower extremity swelling. The patient reports worsening swelling in both of her lower extremities. She notes the swelling is from her hips to her feet. The patient reports a 12 lbs weight gain over the past several weeks. She notes feeling shortness of breath especially with exertion. She does have some shortness of breath at baseline but states this feels worse than normal. The patient states her heart rate has been in the 110s at rest. The patient was taking Lasix 60 mg BID but had labs drawn on 1/16. Patient's creatinine was elevated to 1.60 and she was recommended to decrease her dose of lasix to 40 mg BID by her [facility] provider. The patient is anticoagulated on Eliquis. She denies missed doses of medications. He has an appointment to she her cardiologist on 2/1 and to visit the [facility] clinic on 2/8.     Wt Readings from Last 4 Encounters:   01/24/23 54 kg (119 lb)   01/09/23 53.2 kg (117 lb 3.2 oz)   12/21/22 49.8 kg (109 lb 12.8 oz)   12/19/22 49.4 kg (109 lb)         Past Medical History  Past Medical History:   Diagnosis Date     Arthritis      Cardiac arrest (H)      History of blood clots 2017    PICC line Right arm      History of chemotherapy      History of  transfusion      Hypertension      Neuropathy      Physical deconditioning      Positive PPD, 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     VSD (ventricular septal defect)      Past Surgical History:   Procedure Laterality Date     ANESTHESIA CARDIOVERSION N/A 5/17/2017    Procedure: ANESTHESIA CARDIOVERSION;  ANESTHESIA CARDIOVERSION;  Surgeon: GENERIC ANESTHESIA PROVIDER;  Location: [facility] OR     ANESTHESIA CARDIOVERSION  3/12/2018    Procedure: ANESTHESIA CARDIOVERSION;;  Surgeon: GENERIC ANESTHESIA PROVIDER;  Location: [facility] OR     ANESTHESIA CARDIOVERSION N/A 3/23/2018    Procedure: ANESTHESIA CARDIOVERSION;  Anesthesia Cardioversion ;  Surgeon: GENERIC ANESTHESIA PROVIDER;  Location: [facility] OR     ANESTHESIA CARDIOVERSION N/A 4/12/2018    Procedure: ANESTHESIA CARDIOVERSION;  Cardioversion;  Surgeon: GENERIC ANESTHESIA PROVIDER;  Location: [facility] OR     ARTHRODESIS WRIST  2000    Right wrist     BONE MARROW ASPIRATE &BIOPSY  7/12/2017          BONE MARROW BIOPSY W/ ASPIRATION  07/12/2017     CARDIOVERSION      5/17/17, 3/12/18, 3/23/18, 4/14/18,      COLONOSCOPY N/A 11/19/2019    Procedure: Colonoscopy, With Polypectomy And Biopsy;  Surgeon: Pj Vasques MD;  Location: [facility]     CV CORONARY ANGIOGRAM N/A 11/28/2022    Procedure: Coronary Angiogram;  Surgeon: Sundar Wood MD;  Location:  HEART CARDIAC CATH LAB     CV RIGHT HEART CATH MEASUREMENTS RECORDED N/A 11/28/2022    Procedure: Right Heart Catheterization;  Surgeon: Sundar Wood MD;  Location:  HEART CARDIAC CATH LAB     EP ABLATION PVC N/A 12/1/2022    Procedure: Ablation Premature Ventricular Contractions;  Surgeon: Juan Eaton MD;  Location:  HEART CARDIAC CATH LAB     EP PACEMAKER N/A 12/13/2019    Procedure: EP Pacemaker;  Surgeon: Pj Trent MD;  Location: [facility] CARDIAC CATH LAB     EXCISE LESION UPPER EXTREMITY Left 5/22/2018    Procedure: EXCISE LESION UPPER EXTREMITY;  Left Arm Wound Excision And Closure, Possible Submuscular Transposition of  Ulnar Nerve  (Choice Anesthesia);  Surgeon: Kevin Sheldon MD;  Location:  OR     FOOT SURGERY      4 left and 2 right     FOOT SURGERY      4 Left and 2 Right      IMPLANT PACEMAKER  12/13/2019    Dr China Trent  [facility] Cardiac Cath lab      IMPLANT PACEMAKER       RELEASE CARPAL TUNNEL Bilateral      RELEASE CARPAL TUNNEL BILATERAL       REPAIR VENTRICULAR SEPTAL DEFECT  1969     TRANSPOSITION ULNAR NERVE (ELBOW) Left 5/22/2018    Procedure: TRANSPOSITION ULNAR NERVE (ELBOW);;  Surgeon: Kevin Sheldon MD;  Location: [facility] OR     ULNAR NERVE TRANSPOSITION Left 05/22/2018     VSD REPAIR  1969     ZZC FUSION FOOT BONES,SUBTALAR Right 10/20/2020    Procedure: RIGHT SUBTALAR JOINT FUSION AND CALCANEOCUBOID FUSION;  Surgeon: Nemesio Crow MD;  Location: [facility];  Service: Orthopedics     acetaminophen (TYLENOL) 500 MG tablet  alendronate (FOSAMAX) 70 MG tablet  apixaban ANTICOAGULANT (ELIQUIS ANTICOAGULANT) 5 MG tablet  atenolol (TENORMIN) 50 MG tablet  calcium carbonate 600 mg-vitamin D 400 units (CALTRATE) 600-400 MG-UNIT per tablet  empagliflozin (JARDIANCE) 10 MG TABS tablet  furosemide (LASIX) 40 MG tablet  gabapentin (NEURONTIN) 100 MG capsule  hydroxychloroquine (PLAQUENIL) 200 MG tablet  loratadine (CLARITIN) 10 MG tablet  magnesium oxide 200 MG TABS  multivitamin, therapeutic with minerals (THERA-VIT-M) TABS tablet  predniSONE (DELTASONE) 1 MG tablet  spironolactone (ALDACTONE) 25 MG tablet  STATIN NOT PRESCRIBED (INTENTIONAL)      Allergies   Allergen Reactions     Amiodarone Other (See Comments) and Difficulty breathing     Lethargic and had trouble breathing- occurred in 2017     Blood Transfusion Related (Informational Only) Hives     Hives with platelets. Give benadryl premedication.     Metoprolol Other (See Comments)     Pt and  report that metoprolol does not work for her and also reports feeling unwell with this medication. She has been able to tolerate atenolol, which as worked  "in controlling her HR.      Other [No Clinical Screening - See Comments]      Penicillin Allergy Skin Test not performed, see antimicrobial management team progress note 7/5/17.       Penicillins      Tape [Adhesive Tape] Rash     Family History  Family History   Problem Relation Age of Onset     Breast Cancer Mother         60s     Hypertension Mother      Alzheimer Disease Mother      Cerebrovascular Disease Mother      Hypertension Father      Cerebrovascular Disease Father      Atrial fibrillation Father      Lymphoma Father      Prostate Cancer Father      Alzheimer Disease Maternal Grandmother         likely     Arthritis Maternal Grandfather      Pneumonia Maternal Grandfather      Diabetes Paternal Grandmother      Social History   Social History     Tobacco Use     Smoking status: Never     Smokeless tobacco: Never   Vaping Use     Vaping Use: Never used   Substance Use Topics     Alcohol use: Not Currently     Comment: none     Drug use: No      Past medical history, past surgical history, medications, allergies, family history, and social history were reviewed with the patient. No additional pertinent items.      A complete review of systems was performed with pertinent positives and negatives noted in the HPI, and all other systems negative.    Physical Exam   BP: 120/81  Pulse: 116  Temp: 98.2  F (36.8  C)  Resp: 16  Height: 144.8 cm (4' 9\")  Weight: 54 kg (119 lb)  SpO2: 97 %  Physical Exam  Vitals and nursing note reviewed.   Constitutional:       General: Amelia Michel is not in acute distress.  HENT:      Mouth/Throat:      Mouth: Mucous membranes are moist.   Eyes:      Extraocular Movements: Extraocular movements intact.   Cardiovascular:      Rate and Rhythm: Tachycardia present.   Pulmonary:      Breath sounds: Rales present.   Musculoskeletal:      Cervical back: Neck supple.   Neurological:      General: No focal deficit present.      Mental Status: Amelia Michel is alert and oriented to " person, place, and time.   Psychiatric:         Mood and Affect: Mood normal.         Behavior: Behavior normal.           ED Course, Procedures, & Data     3:51 PM  The patient was seen and examined by Dr. Romeo Oakes in Room [facility]TA.     Procedures       ED Course Selections:        EKG Interpretation:      Interpreted by Romeo Oakes MD  Time reviewed: 1600  Symptoms at time of EKG: SOA   Rhythm: normal sinus   Rate: normal  Axis: normal  Ectopy: none  Conduction: normal  ST Segments/ T Waves: Chronic T wave inversions in the lateral leads and inferior  Q Waves: none  Comparison to prior: Unchanged from previous    Clinical Impression: No acute abnormalities                     Results for orders placed or performed during the hospital encounter of 01/24/23   XR Chest 2 Views     Status: None    Narrative    EXAM: XR CHEST 2 VIEWS  1/24/2023 4:22 PM      HISTORY: SOA    COMPARISON: Chest x-ray 12/3/2022    FINDINGS: Two views of the chest. Postsurgical changes in the chest  with intact median sternotomy wires. Left chest wall cardiac pacemaker  with leads in stable position in the right atrium and right ventricle.  Trachea is midline. Stable enlargement of the cardiac silhouette.  Anterior mediastinal calcifications, better visualized on 9/10/2019.  No pleural effusion or pneumothorax. No focal airspace. The upper  abdomen is unremarkable. Stable S-shaped curvature and degenerative  changes of the thoracolumbar spine.      Impression    IMPRESSION: Stable cardiomegaly without focal airspace disease.    I have personally reviewed the examination and initial interpretation  and I agree with the findings.    ALINA HOWARD MD         SYSTEM ID:  [id number]   Basic metabolic panel     Status: Abnormal   Result Value Ref Range    Sodium 126 (L) 136 - 145 mmol/L    Potassium 4.0 3.4 - 5.3 mmol/L    Chloride 89 (L) 98 - 107 mmol/L    Carbon Dioxide (CO2) 21 (L) 22 - 29 mmol/L    Anion Gap 16 (H) 7 - 15  mmol/L    Urea Nitrogen 38.2 (H) 8.0 - 23.0 mg/dL    Creatinine 1.14 0.51 - 1.17 mg/dL    Calcium 9.2 8.8 - 10.2 mg/dL    Glucose 113 (H) 70 - 99 mg/dL    GFR Estimate 54 (L) >60 mL/min/1.73m2    Narrative    The sex of this patient cannot be reliably determined based on discrepancies in demographics (legal sex, sex assigned at birth, gender identity).  Both male and female reference ranges are provided where applicable.  Careful evaluation of the patient's results as compared to the gender specific reference intervals is required in this setting.    Nt probnp inpatient (BNP)     Status: Abnormal   Result Value Ref Range    N terminal Pro BNP Inpatient 2,067 (H) 0 - 900 pg/mL   CBC with platelets and differential     Status: Abnormal   Result Value Ref Range    WBC Count 5.5 4.0 - 11.0 10e3/uL    RBC Count 4.02 3.80 - 5.90 10e6/uL    Hemoglobin 14.7 11.7 - 17.7 g/dL    Hematocrit 43.0 35.0 - 53.0 %     (H) 78 - 100 fL    MCH 36.6 (H) 26.5 - 33.0 pg    MCHC 34.2 31.5 - 36.5 g/dL    RDW 17.0 (H) 10.0 - 15.0 %    Platelet Count 91 (L) 150 - 450 10e3/uL    % Neutrophils 79 %    % Lymphocytes 9 %    % Monocytes 9 %    % Eosinophils 1 %    % Basophils 1 %    % Immature Granulocytes 1 %    NRBCs per 100 WBC 0 <1 /100    Absolute Neutrophils 4.5 1.6 - 8.3 10e3/uL    Absolute Lymphocytes 0.5 (L) 0.8 - 5.3 10e3/uL    Absolute Monocytes 0.5 0.0 - 1.3 10e3/uL    Absolute Eosinophils 0.0 0.0 - 0.7 10e3/uL    Absolute Basophils 0.0 0.0 - 0.2 10e3/uL    Absolute Immature Granulocytes 0.0 <=0.4 10e3/uL    Absolute NRBCs 0.0 10e3/uL    Narrative    The sex of this patient cannot be reliably determined based on discrepancies in demographics (legal sex, sex assigned at birth, gender identity).  Both male and female reference ranges are provided where applicable.  Careful evaluation of the patient s results as compared to the gender specific reference intervals is required in this setting.    EKG 12-lead, tracing only      Status: None   Result Value Ref Range    Systolic Blood Pressure  mmHg    Diastolic Blood Pressure  mmHg    Ventricular Rate 111 BPM    Atrial Rate 79 BPM    GA Interval  ms    QRS Duration 90 ms     ms    QTc 478 ms    P Axis  degrees    R AXIS 130 degrees    T Axis 254 degrees    Interpretation ECG       Atrial fibrillation with rapid ventricular response  Right axis deviation  ST & T wave abnormality, consider inferior ischemia  ST & T wave abnormality, consider anterolateral ischemia  Abnormal ECG  Unconfirmed report - interpretation of this ECG is computer generated - see medical record for final interpretation  Confirmed by - EMERGENCY ROOM, PHYSICIAN ([id number]),  STEPHANIE SAINI ([id number]) on 1/24/2023 6:31:48 PM     CBC with platelets differential     Status: Abnormal    Narrative    The following orders were created for panel order CBC with platelets differential.  Procedure                               Abnormality         Status                     ---------                               -----------         ------                     CBC with platelets and d...[[id number]]  Abnormal            Final result                 Please view results for these tests on the individual orders.     Medications   furosemide (LASIX) injection 40 mg (has no administration in time range)     Labs Ordered and Resulted from Time of ED Arrival to Time of ED Departure   BASIC METABOLIC PANEL - Abnormal       Result Value    Sodium 126 (*)     Potassium 4.0      Chloride 89 (*)     Carbon Dioxide (CO2) 21 (*)     Anion Gap 16 (*)     Urea Nitrogen 38.2 (*)     Creatinine 1.14      Calcium 9.2      Glucose 113 (*)     GFR Estimate 54 (*)    NT PROBNP INPATIENT - Abnormal    N terminal Pro BNP Inpatient 2,067 (*)    CBC WITH PLATELETS AND DIFFERENTIAL - Abnormal    WBC Count 5.5      RBC Count 4.02      Hemoglobin 14.7      Hematocrit 43.0       (*)     MCH 36.6 (*)     MCHC 34.2      RDW 17.0 (*)     Platelet Count  91 (*)     % Neutrophils 79      % Lymphocytes 9      % Monocytes 9      % Eosinophils 1      % Basophils 1      % Immature Granulocytes 1      NRBCs per 100 WBC 0      Absolute Neutrophils 4.5      Absolute Lymphocytes 0.5 (*)     Absolute Monocytes 0.5      Absolute Eosinophils 0.0      Absolute Basophils 0.0      Absolute Immature Granulocytes 0.0      Absolute NRBCs 0.0       XR Chest 2 Views   Final Result   IMPRESSION: Stable cardiomegaly without focal airspace disease.      I have personally reviewed the examination and initial interpretation   and I agree with the findings.      ALINA HOWARD MD            SYSTEM ID:  [id number]             Medical Decision Making  The patient's presentation is strongly suggestive of a chronic illness mild to moderate exacerbation, progression, or side effect of treatment.    The patient's evaluation involved:  ordering and/or review of 3+ test(s) in this encounter (EKG chest x-ray CBC metabolic panel troponin BNP)    The patient's management involved a decision regarding hospitalization.      Assessment & Plan    62-year-old female with extensive cardiac history here now with increased edema weight gain and shortness of breath after decrease of her chronic Lasix dose by her clinic because of increasing creatinine.  She is up 12 to 15 pounds will be admitted to cardiology 1 for diuresis.  No evidence for acute ischemic changes.  Her chest x-ray shows cardiomegaly.  Diuresis was ordered she is in stable condition not hypoxic.    I have reviewed the nursing notes. I have reviewed the findings, diagnosis, plan and need for follow up with the patient.    New Prescriptions    No medications on file       Final diagnoses:   Acute heart failure with preserved ejection fraction (HFpEF) (H)   Hypervolemia, unspecified hypervolemia type   Edema, unspecified type     I, Selvin Barroso, am serving as a trained medical scribe to document services personally performed by Romeo Oakes,  MD, based on the provider's statements to me.      I, Romeo Oakes MD, was physically present and have reviewed and verified the accuracy of this note documented by Sevlin Barroso.     Romeo Oakes MD  [facility] EMERGENCY DEPARTMENT  1/24/2023     Romeo Oakes MD  01/24/23 1928

## 2023-01-24 NOTE — TELEPHONE ENCOUNTER
[facility] Call Center    Phone Message    May a detailed message be left on voicemail: yes     Reason for Call: Symptoms or Concerns     If patient has red-flag symptoms, warm transfer to triage line    Current symptom or concern: 12 lb wt gain in the last six days       Action Taken: Patient transferred to: Triage     Travel Screening: Not Applicable     Thank you!  [facility]

## 2023-01-24 NOTE — TELEPHONE ENCOUNTER
Called Bree back.  She had just arrived at the [facility].  Has [facility] follow up scheduled for 2/8, can be moved up as needed depending on discharge.

## 2023-01-24 NOTE — ED TRIAGE NOTES
"Triage Assessment & Note:    /81   Pulse 116   Temp 98.2  F (36.8  C) (Temporal)   Resp 16   Ht 1.448 m (4' 9\")   Wt 54 kg (119 lb)   SpO2 97%   BMI 25.75 kg/m      Patient presents with: PT c/o SOB and a 12lbs weight gain over 6 days and swelling in her legs up to her groin.     Home Treatments/Remedies: None     Febrile / Afebrile? Afebrile     Duration of C/o:  6 days     Cristóbal Bordne RN  January 24, 2023         Triage Assessment     Row Name 01/24/23 1539       Triage Assessment (Adult)    Airway WDL WDL       Respiratory WDL    Respiratory WDL WDL       Cardiac WDL    Cardiac WDL WDL              "

## 2023-01-24 NOTE — TELEPHONE ENCOUNTER
I notified Kandy WOMACK RN for the HF pool regarding this message and I will route this message to the HF pool for further review.     Drake LEE

## 2023-01-25 ENCOUNTER — ANCILLARY PROCEDURE (OUTPATIENT)
Dept: CARDIOLOGY | Facility: CLINIC | Age: 63
DRG: 291 | End: 2023-01-25
Attending: INTERNAL MEDICINE
Payer: COMMERCIAL

## 2023-01-25 DIAGNOSIS — R00.1 BRADYCARDIA: ICD-10-CM

## 2023-01-25 DIAGNOSIS — R00.1 BRADYCARDIA: Primary | ICD-10-CM

## 2023-01-25 LAB
ANION GAP SERPL CALCULATED.3IONS-SCNC: 16 MMOL/L (ref 7–15)
BASOPHILS # BLD AUTO: 0 10E3/UL (ref 0–0.2)
BASOPHILS NFR BLD AUTO: 1 %
BUN SERPL-MCNC: 32.1 MG/DL (ref 8–23)
CALCIUM SERPL-MCNC: 9.3 MG/DL (ref 8.8–10.2)
CHLORIDE SERPL-SCNC: 93 MMOL/L (ref 98–107)
CREAT SERPL-MCNC: 1.12 MG/DL (ref 0.51–1.17)
DEPRECATED HCO3 PLAS-SCNC: 23 MMOL/L (ref 22–29)
EOSINOPHIL # BLD AUTO: 0.1 10E3/UL (ref 0–0.7)
EOSINOPHIL NFR BLD AUTO: 1 %
ERYTHROCYTE [DISTWIDTH] IN BLOOD BY AUTOMATED COUNT: 17 % (ref 10–15)
GFR SERPL CREATININE-BSD FRML MDRD: 55 ML/MIN/1.73M2
GLUCOSE SERPL-MCNC: 106 MG/DL (ref 70–99)
HCT VFR BLD AUTO: 42.4 % (ref 35–53)
HGB BLD-MCNC: 14.3 G/DL (ref 11.7–17.7)
HOLD SPECIMEN: NORMAL
IMM GRANULOCYTES # BLD: 0 10E3/UL
IMM GRANULOCYTES NFR BLD: 0 %
LYMPHOCYTES # BLD AUTO: 0.6 10E3/UL (ref 0.8–5.3)
LYMPHOCYTES NFR BLD AUTO: 11 %
MAGNESIUM SERPL-MCNC: 2.1 MG/DL (ref 1.7–2.3)
MCH RBC QN AUTO: 36.5 PG (ref 26.5–33)
MCHC RBC AUTO-ENTMCNC: 33.7 G/DL (ref 31.5–36.5)
MCV RBC AUTO: 108 FL (ref 78–100)
MONOCYTES # BLD AUTO: 0.5 10E3/UL (ref 0–1.3)
MONOCYTES NFR BLD AUTO: 10 %
NEUTROPHILS # BLD AUTO: 3.9 10E3/UL (ref 1.6–8.3)
NEUTROPHILS NFR BLD AUTO: 77 %
NRBC # BLD AUTO: 0 10E3/UL
NRBC BLD AUTO-RTO: 0 /100
PLATELET # BLD AUTO: 91 10E3/UL (ref 150–450)
POTASSIUM SERPL-SCNC: 3.1 MMOL/L (ref 3.4–5.3)
POTASSIUM SERPL-SCNC: 4.1 MMOL/L (ref 3.4–5.3)
RBC # BLD AUTO: 3.92 10E6/UL (ref 3.8–5.9)
SODIUM SERPL-SCNC: 132 MMOL/L (ref 136–145)
WBC # BLD AUTO: 5.1 10E3/UL (ref 4–11)

## 2023-01-25 PROCEDURE — 250N000011 HC RX IP 250 OP 636: Performed by: NURSE PRACTITIONER

## 2023-01-25 PROCEDURE — 36415 COLL VENOUS BLD VENIPUNCTURE: CPT

## 2023-01-25 PROCEDURE — 83735 ASSAY OF MAGNESIUM: CPT | Performed by: INTERNAL MEDICINE

## 2023-01-25 PROCEDURE — 250N000012 HC RX MED GY IP 250 OP 636 PS 637

## 2023-01-25 PROCEDURE — 99232 SBSQ HOSP IP/OBS MODERATE 35: CPT | Mod: 24 | Performed by: CASE MANAGER/CARE COORDINATOR

## 2023-01-25 PROCEDURE — 250N000013 HC RX MED GY IP 250 OP 250 PS 637

## 2023-01-25 PROCEDURE — 250N000013 HC RX MED GY IP 250 OP 250 PS 637: Performed by: INTERNAL MEDICINE

## 2023-01-25 PROCEDURE — 93296 REM INTERROG EVL PM/IDS: CPT

## 2023-01-25 PROCEDURE — 84132 ASSAY OF SERUM POTASSIUM: CPT | Performed by: INTERNAL MEDICINE

## 2023-01-25 PROCEDURE — 93294 REM INTERROG EVL PM/LDLS PM: CPT | Performed by: INTERNAL MEDICINE

## 2023-01-25 PROCEDURE — 214N000001 HC R&B CCU UMMC

## 2023-01-25 PROCEDURE — 99222 1ST HOSP IP/OBS MODERATE 55: CPT | Mod: 24 | Performed by: INTERNAL MEDICINE

## 2023-01-25 PROCEDURE — 250N000011 HC RX IP 250 OP 636

## 2023-01-25 PROCEDURE — 36415 COLL VENOUS BLD VENIPUNCTURE: CPT | Performed by: INTERNAL MEDICINE

## 2023-01-25 PROCEDURE — 80048 BASIC METABOLIC PNL TOTAL CA: CPT

## 2023-01-25 PROCEDURE — 85025 COMPLETE CBC W/AUTO DIFF WBC: CPT

## 2023-01-25 RX ORDER — ERGOCALCIFEROL (VITAMIN D2) 10 MCG
1 TABLET ORAL DAILY
COMMUNITY

## 2023-01-25 RX ORDER — FUROSEMIDE 10 MG/ML
80 INJECTION INTRAMUSCULAR; INTRAVENOUS 2 TIMES DAILY
Status: DISCONTINUED | OUTPATIENT
Start: 2023-01-25 | End: 2023-01-27

## 2023-01-25 RX ORDER — FUROSEMIDE 10 MG/ML
60 INJECTION INTRAMUSCULAR; INTRAVENOUS 2 TIMES DAILY
Status: DISCONTINUED | OUTPATIENT
Start: 2023-01-25 | End: 2023-01-25

## 2023-01-25 RX ORDER — POTASSIUM CHLORIDE 750 MG/1
40 TABLET, EXTENDED RELEASE ORAL ONCE
Status: COMPLETED | OUTPATIENT
Start: 2023-01-25 | End: 2023-01-25

## 2023-01-25 RX ADMIN — ATENOLOL 50 MG: 50 TABLET ORAL at 00:35

## 2023-01-25 RX ADMIN — SPIRONOLACTONE 25 MG: 25 TABLET, FILM COATED ORAL at 08:05

## 2023-01-25 RX ADMIN — ACETAMINOPHEN 650 MG: 325 TABLET, FILM COATED ORAL at 00:34

## 2023-01-25 RX ADMIN — GABAPENTIN 100 MG: 100 CAPSULE ORAL at 14:39

## 2023-01-25 RX ADMIN — FUROSEMIDE 40 MG: 10 INJECTION, SOLUTION INTRAVENOUS at 02:17

## 2023-01-25 RX ADMIN — FUROSEMIDE 60 MG: 10 INJECTION, SOLUTION INTRAVENOUS at 11:14

## 2023-01-25 RX ADMIN — POTASSIUM CHLORIDE 40 MEQ: 750 TABLET, EXTENDED RELEASE ORAL at 11:16

## 2023-01-25 RX ADMIN — GABAPENTIN 100 MG: 100 CAPSULE ORAL at 08:04

## 2023-01-25 RX ADMIN — FUROSEMIDE 80 MG: 10 INJECTION, SOLUTION INTRAVENOUS at 19:35

## 2023-01-25 RX ADMIN — ACETAMINOPHEN 650 MG: 325 TABLET, FILM COATED ORAL at 08:16

## 2023-01-25 RX ADMIN — Medication 1 TABLET: at 08:04

## 2023-01-25 RX ADMIN — GABAPENTIN 200 MG: 100 CAPSULE ORAL at 21:28

## 2023-01-25 RX ADMIN — Medication 3 MG: at 08:05

## 2023-01-25 RX ADMIN — ACETAMINOPHEN 650 MG: 325 TABLET, FILM COATED ORAL at 12:25

## 2023-01-25 RX ADMIN — LORATADINE 10 MG: 10 TABLET ORAL at 08:05

## 2023-01-25 RX ADMIN — APIXABAN 5 MG: 5 TABLET, FILM COATED ORAL at 19:36

## 2023-01-25 RX ADMIN — ACETAMINOPHEN 650 MG: 325 TABLET, FILM COATED ORAL at 21:28

## 2023-01-25 RX ADMIN — APIXABAN 5 MG: 5 TABLET, FILM COATED ORAL at 00:34

## 2023-01-25 RX ADMIN — HYDROXYCHLOROQUINE SULFATE 200 MG: 200 TABLET, FILM COATED ORAL at 08:05

## 2023-01-25 RX ADMIN — APIXABAN 5 MG: 5 TABLET, FILM COATED ORAL at 08:05

## 2023-01-25 RX ADMIN — GABAPENTIN 200 MG: 100 CAPSULE ORAL at 00:35

## 2023-01-25 RX ADMIN — EMPAGLIFLOZIN 10 MG: 10 TABLET, FILM COATED ORAL at 12:55

## 2023-01-25 ASSESSMENT — ACTIVITIES OF DAILY LIVING (ADL)
CONCENTRATING,_REMEMBERING_OR_MAKING_DECISIONS_DIFFICULTY: YES
ADLS_ACUITY_SCORE: 25
TOILETING: 0-->INDEPENDENT
DIFFICULTY_COMMUNICATING: NO
ADLS_ACUITY_SCORE: 35
ADLS_ACUITY_SCORE: 35
HEARING_DIFFICULTY_OR_DEAF: NO
WALKING_OR_CLIMBING_STAIRS_DIFFICULTY: YES
DRESSING/BATHING_DIFFICULTY: NO
ADLS_ACUITY_SCORE: 35
DIFFICULTY_EATING/SWALLOWING: NO
TOILETING_ASSISTANCE: TOILETING DIFFICULTY, ASSISTANCE 1 PERSON
ADLS_ACUITY_SCORE: 25
WEAR_GLASSES_OR_BLIND: YES
ADLS_ACUITY_SCORE: 35
WALKING_OR_CLIMBING_STAIRS: AMBULATION DIFFICULTY, ASSISTANCE 1 PERSON
TOILETING_MANAGEMENT: URGENCY
CHANGE_IN_FUNCTIONAL_STATUS_SINCE_ONSET_OF_CURRENT_ILLNESS/INJURY: NO
ADLS_ACUITY_SCORE: 35
TOILETING: 0-->INDEPENDENT
ADLS_ACUITY_SCORE: 35
EQUIPMENT_CURRENTLY_USED_AT_HOME: WALKER, ROLLING
TRANSFERRING: 0-->INDEPENDENT
ADLS_ACUITY_SCORE: 35
TRANSFERRING: 0-->INDEPENDENT
ADLS_ACUITY_SCORE: 35
FALL_HISTORY_WITHIN_LAST_SIX_MONTHS: NO
VISION_MANAGEMENT: GLASSES ON
TOILETING_ISSUES: YES
ADLS_ACUITY_SCORE: 35
DOING_ERRANDS_INDEPENDENTLY_DIFFICULTY: YES
ADLS_ACUITY_SCORE: 35

## 2023-01-25 NOTE — PLAN OF CARE
Problem: Goal Outcome Summary  Goal: Goal Outcome Summary  1. Pt will have fevers well controlled  2. Pt will be hemodynamically stable   A&Ox4. B/P runs high.Pt is bradycardic mostly.Denied pain.Denied nausea.Bs runs in the high 100's and 200's, covered per sliding scale. Good appetite. Huynh with good urine output. Phos was 2.3, replaced. K 4.2, but pt had 20 meq potassium IV x2 per provider order to keep K within normal range as pt is on diuretics.Had shower with assist of CNA.  Plan for dc to [facility] tomorrow.[facility] is arranged through [facility] via wheel chair tomorrow at 11:15 am per  note.     Problem: Chemotherapy Effects (Adult)  Goal: Signs and Symptoms of Listed Potential Problems Will be Absent or Manageable (Chemotherapy Effects)  Signs and symptoms of listed potential problems will be absent or manageable by discharge/transition of care (reference Chemotherapy Effects (Adult) CPG).     08/03/17 1946   Chemotherapy Effects   Problems Assessed (Chemotherapy Effects) fatigue            The patient is a 1y4m Female complaining of seizures.

## 2023-01-25 NOTE — PHARMACY
"The following home medications were NOT continued on inpatient admission per \"Discontinuation of nonessential home medications during hospitalization\" policy: aledronate    If a therapeutic holiday is deemed inappropriate per the prescriber, please notify the pharmacist regarding the medication order.    The pharmacist is available to answer any questions and/or concerns the patient may have regarding discontinuation of non-essential medications.    Please ensure that these medications are restarted as needed upon discharge via the medication reconciliation discharge process and included on the discharge medication reconciliation report.    Thank you,  Ana Toussaint, [facility]    "

## 2023-01-25 NOTE — CONSULTS
Electrophysiology Consultation Note   EP Attending: Dr. Trent  Reason for consultation: Afib/FL, PPM mgmt  Provider requesting consultation: Dr. Walker.  Date of Service: 1/25/2023      HPI:   Ms. Michel is a 61 yo F with a pmhx of HFpEF, severe TR, permanent atrial flutter/fibrillation on eliquis, NSVT s/p PVC ablation (11/30/22), SSS s/p PPM (2019), VSD s/p repair (1969), RA,  h/o cardiac arrest (2017), DLBCL s/p CHOP tx who presented on 1/24/23 for symptoms of SOB, BLE edema. EP consulted for mgmt of Afib/flutter and PPM.     Patient has followed with Dr. Eaton in the past, LOV 8/25/2021. She has a known history of atrial tachyarrhythmias including SVT, Afib/AFL. She has been maintained on eliquis since a BRE/DCCV in 5/2017. She has been treated with sotalol, though did not tolerate due to nausea. Amiodarone was later initiated that month, though this was stopped due to concerns that this contributed to her cardiac arrest in 2017 (noted to be a shockable rhythm). However, EP at that time did not believe amio was the likely culprit, and it has been later reported that malignant involvement of the pericardium c/b organizing pericardial effusion could be the cause of arrest. Rate control with digoxin and atenolol were pursued, though due to junctional rhythm/SSS to the 30s she had a PPM placed in 2019. Digoxin has been on and off since this time due to recurrence of AF with RVR (stopped 11/2022 admission). Has also followed with General Cardiology for mgmt of her HFpEF.     She was most recently admitted on 11/19/22 for ADHF. RHC on 11/28/22 showed evidence of cardiogenic shock with elevated biventricular filling pressures and CI/CO of 1.2/1.7. Device interrogation at that time with normal function, intrinsic AF,  53.4%. Noted to have frequent PVCs on telemetry as well as NSVT, which was thought to be contributing to her hemodynamics and symptoms. Treated with lidocaine gtt. She underwent a PVC/VT ablation on  12/1/22. Infrequent ventricular ectopy noted thereafter. As noted, digoxin stopped on discharge.     Patient now presents with increased weight gain, SOB, BLE swelling. MONTALVO. No chest pain or pressure. Pro NT BNP 2067 on admission. Weight 9 lbs above baseline. Given IV diuresis. Atenolol was held by primary.     Today, patient present with her spouse. Endorses good UOP with diuresis. No recent chest pain, pressure, syncope, pre syncope, lightheadedness, dizziness. Compliant with all cardiac medications.     Outpatient Cardiac Medications:  - Eliquis 5 mg BID  - Atenolol 50 mg BID  - Jardiance 10 mg daily   - Lasix 40 mg BID  - Spironolactone 50 mg daily     Past Medical History:   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     VSD (ventricular septal defect)      Past Surgical History:   Past Surgical History:   Procedure Laterality Date     ANESTHESIA CARDIOVERSION N/A 5/17/2017    Procedure: ANESTHESIA CARDIOVERSION;  ANESTHESIA CARDIOVERSION;  Surgeon: GENERIC ANESTHESIA PROVIDER;  Location: [facility] OR     ANESTHESIA CARDIOVERSION  3/12/2018    Procedure: ANESTHESIA CARDIOVERSION;;  Surgeon: GENERIC ANESTHESIA PROVIDER;  Location: [facility] OR     ANESTHESIA CARDIOVERSION N/A 3/23/2018    Procedure: ANESTHESIA CARDIOVERSION;  Anesthesia Cardioversion ;  Surgeon: GENERIC ANESTHESIA PROVIDER;  Location: [facility] OR     ANESTHESIA CARDIOVERSION N/A 4/12/2018    Procedure: ANESTHESIA CARDIOVERSION;  Cardioversion;  Surgeon: GENERIC ANESTHESIA PROVIDER;  Location: [facility] OR     ARTHRODESIS WRIST  2000    Right wrist     BONE MARROW ASPIRATE &BIOPSY  7/12/2017          BONE MARROW BIOPSY W/ ASPIRATION  07/12/2017     CARDIOVERSION      5/17/17, 3/12/18, 3/23/18, 4/14/18,      COLONOSCOPY N/A 11/19/2019     Procedure: Colonoscopy, With Polypectomy And Biopsy;  Surgeon: Pj Vasques MD;  Location: [facility]     CV CORONARY ANGIOGRAM N/A 11/28/2022    Procedure: Coronary Angiogram;  Surgeon: Sundar Wood MD;  Location:  HEART CARDIAC CATH LAB     CV RIGHT HEART CATH MEASUREMENTS RECORDED N/A 11/28/2022    Procedure: Right Heart Catheterization;  Surgeon: Sundar Wood MD;  Location: [facility] CARDIAC CATH LAB     EP ABLATION PVC N/A 12/1/2022    Procedure: Ablation Premature Ventricular Contractions;  Surgeon: Juan Eaton MD;  Location:  HEART CARDIAC CATH LAB     EP PACEMAKER N/A 12/13/2019    Procedure: EP Pacemaker;  Surgeon: Pj Trent MD;  Location: [facility] CARDIAC CATH LAB     EXCISE LESION UPPER EXTREMITY Left 5/22/2018    Procedure: EXCISE LESION UPPER EXTREMITY;  Left Arm Wound Excision And Closure, Possible Submuscular Transposition of Ulnar Nerve  (Choice Anesthesia);  Surgeon: Kevin Sheldon MD;  Location:  OR     FOOT SURGERY      4 left and 2 right     FOOT SURGERY      4 Left and 2 Right      IMPLANT PACEMAKER  12/13/2019    Dr China Trent  [facility] Cardiac Cath lab      IMPLANT PACEMAKER       RELEASE CARPAL TUNNEL Bilateral      RELEASE CARPAL TUNNEL BILATERAL       REPAIR VENTRICULAR SEPTAL DEFECT  1969     TRANSPOSITION ULNAR NERVE (ELBOW) Left 5/22/2018    Procedure: TRANSPOSITION ULNAR NERVE (ELBOW);;  Surgeon: Kevin Sheldon MD;  Location:  OR     ULNAR NERVE TRANSPOSITION Left 05/22/2018     VSD REPAIR  1969     ZZC FUSION FOOT BONES,SUBTALAR Right 10/20/2020    Procedure: RIGHT SUBTALAR JOINT FUSION AND CALCANEOCUBOID FUSION;  Surgeon: Nemesio Crow MD;  Location: [facility];  Service: Orthopedics     Allergies: Per MAR     Allergies   Allergen Reactions     Amiodarone Other (See Comments) and Difficulty breathing     Lethargic and had trouble breathing- occurred in 2017     Blood Transfusion Related (Informational Only) Hives     Hives with  platelets. Give benadryl premedication.     Metoprolol Other (See Comments)     Pt and  report that metoprolol does not work for her and also reports feeling unwell with this medication. She has been able to tolerate atenolol, which as worked in controlling her HR.      Other [No Clinical Screening - See Comments]      Penicillin Allergy Skin Test not performed, see antimicrobial management team progress note 7/5/17.       Penicillins      Tape [Adhesive Tape] Rash     Medications:   Per MAR current outpatient cardiovascular medications include: (Not in a hospital admission)    Current Outpatient Medications   Medication Sig Dispense Refill     acetaminophen (TYLENOL) 500 MG tablet Take 500 mg by mouth every 6 hours as needed for mild pain       alendronate (FOSAMAX) 70 MG tablet TAKE 1 TABLET(70 MG) BY MOUTH EVERY 7 DAYS 12 tablet 3     apixaban ANTICOAGULANT (ELIQUIS ANTICOAGULANT) 5 MG tablet Take 1 tablet (5 mg) by mouth 2 times daily 180 tablet 3     atenolol (TENORMIN) 50 MG tablet Take 1 tablet (50 mg) by mouth 2 times daily 180 tablet 3     calcium carbonate 600 mg-vitamin D 400 units (CALTRATE) 600-400 MG-UNIT per tablet Take 2 tablets by mouth daily       empagliflozin (JARDIANCE) 10 MG TABS tablet Take 1 tablet (10 mg) by mouth daily for 360 days 90 tablet 3     furosemide (LASIX) 40 MG tablet Take 1 tablet (40 mg) by mouth 2 times daily for 240 days 120 tablet 3     gabapentin (NEURONTIN) 100 MG capsule Take 1 capsule AM + 1 capsule afternoon + 2 capsules at bedtime 360 capsule 1     hydroxychloroquine (PLAQUENIL) 200 MG tablet TAKE 1 TABLET(200 MG) BY MOUTH DAILY 90 tablet 0     loratadine (CLARITIN) 10 MG tablet Take 10 mg by mouth daily       magnesium oxide 200 MG TABS Take 200 mg by mouth daily bedtime       multivitamin, therapeutic with minerals (THERA-VIT-M) TABS tablet Take 1 tablet by mouth daily 30 each 0     predniSONE (DELTASONE) 1 MG tablet Take 3 tablets (3 mg) by mouth daily 270  "tablet 3     spironolactone (ALDACTONE) 25 MG tablet Take 1 tablet (25 mg) by mouth daily 90 tablet 3     STATIN NOT PRESCRIBED (INTENTIONAL) Please choose reason not prescribed, below       Current Facility-Administered Medications   Medication Dose Route Frequency     apixaban ANTICOAGULANT  5 mg Oral BID     empagliflozin  10 mg Oral Daily     furosemide  60 mg Intravenous BID     gabapentin  100 mg Oral BID     gabapentin  200 mg Oral At Bedtime     hydroxychloroquine  200 mg Oral Daily     loratadine  10 mg Oral Daily     multivitamin w/minerals  1 tablet Oral Daily     predniSONE  3 mg Oral Daily     sodium chloride (PF)  3 mL Intracatheter Q8H     spironolactone  25 mg Oral Daily     Family History:   Family History   Problem Relation Age of Onset     Breast Cancer Mother         60s     Hypertension Mother      Alzheimer Disease Mother      Cerebrovascular Disease Mother      Hypertension Father      Cerebrovascular Disease Father      Atrial fibrillation Father      Lymphoma Father      Prostate Cancer Father      Alzheimer Disease Maternal Grandmother         likely     Arthritis Maternal Grandfather      Pneumonia Maternal Grandfather      Diabetes Paternal Grandmother      Social History:   Social History     Tobacco Use     Smoking status: Never     Smokeless tobacco: Never   Substance Use Topics     Alcohol use: Not Currently     Comment: none     ROS:   A comprehensive 10 point ROS was negative other than as mentioned in HPI.    Physical Examination:   VITALS: /74 (BP Location: Other (Comment), Patient Position: Supine, Cuff Size: Adult Regular)   Pulse 105   Temp 97.6  F (36.4  C) (Oral)   Resp 18   Ht 1.448 m (4' 9\")   Wt 54 kg (119 lb)   SpO2 98%   BMI 25.75 kg/m      GENERAL APPEARANCE: AxO, NAD  HEENT: NCAT, EOMI, MMM.   NECK: Supple. +JVD. Good carotid upstroke.   CHEST: CTAB   CARDIOVASCULAR: S1S2, Irregular, No m/r/g.   ABDOMEN: BS+, soft, No pulsatile masses or bruits. "   EXTREMITIES: 1-2+ BLE edema. Distal pulses intact.   NEURO: Grossly nonfocal.   PSYCH: Normal affect.  SKIN: Warm and dry.     Data:   Labs:  BMP  Recent Labs   Lab 01/25/23  0454 01/24/23  1631   * 126*   POTASSIUM 3.1* 4.0   CHLORIDE 93* 89*   VIKTORIYA 9.3 9.2   CO2 23 21*   BUN 32.1* 38.2*   CR 1.12 1.14   * 113*     CBC  Recent Labs   Lab 01/25/23  0454 01/24/23  1631   WBC 5.1 5.5   RBC 3.92 4.02   HGB 14.3 14.7   HCT 42.4 43.0   * 107*   MCH 36.5* 36.6*   MCHC 33.7 34.2   RDW 17.0* 17.0*   PLT 91* 91*     INRNo lab results found in last 7 days.  No results found for: CKTOTAL, CKMB, TROPN  Cholesterol (mg/dL)   Date Value   12/21/2022 98   10/15/2021 124   10/14/2020 129   10/24/2019 121   05/24/2018 96   04/29/2016 106     Cholesterol/HDL Ratio (no units)   Date Value   02/04/2011 3.0   02/04/2011 3.0     HDL Cholesterol (mg/dL)   Date Value   10/14/2020 48 (L)   10/24/2019 50   05/24/2018 35 (L)   04/29/2016 10 (L)     Direct Measure HDL (mg/dL)   Date Value   12/21/2022 43   10/15/2021 50     LDL Cholesterol Calculated (mg/dL)   Date Value   12/21/2022 42   10/15/2021 55   10/14/2020 54   10/24/2019 54   05/24/2018 37   04/29/2016 46     EKG 1/24/23:   Afib with RVR     TTE 12/5/22:   Interpretation Summary  Severe tricuspid insufficiency is present.  Moderate right ventricular dilation is present. Global right ventricular  function is mildly reduced.  Global and regional left ventricular function is normal with an EF of 55-60%.  IVC diameter >2.1 cm collapsing <50% with sniff suggests a high RA pressure  estimated at 15 mmHg or greater.  No pericardial effusion is present.  No significant changes noted.    Device Check 12/2/22:  Mode: VVIR  bpm  : 28.4%  Intrinsic rhythm: AF w/ irregular VS @  bpm  Short V-V intervals: 0  Lead Trends Appear Stable: Yes  Estimated battery longevity to RRT = 11 years. Battery voltage = 3.02 V.  Atrial arrhythmia: Chronic AF  AF burden:  N/R  Anticoagulant: Eliquis  Ventricular Arrhythmia: 12,850 VT episodes recorded since 11/19/22, lasting <1 second to 1.5 minutes at 154-222 bpm. Episode EGMs reveal some episodes of irregular R-R intervals, suggestive of AF w/ RVR. Other episodes are suggestive of VT and/or PVC runs.  PVC Runs = 247 per hour  PVC Singles = 718 per hour  Setting changes: None    Assessment:   Ms. Michel is a 61 yo F with a pmhx of HFpEF, severe TR, permanent atrial flutter/fibrillation on eliquis, NSVT s/p PVC ablation (11/30/22), SSS s/p PPM (2019), VSD s/p repair (1969), RA,  h/o cardiac arrest (2017), DLBCL s/p CHOP tx who presented on 1/24/23 for symptoms of SOB, BLE edema. EP consulted for mgmt of Afib/flutter and PPM.     Patient presenting with HFpEF exacerbation. Initially with Afib with RVR, though rate now improved s/p diuresis. Patient has known history of Afib/flutter that has been considered to be permanent. She has not tolerated amiodarone and sotalol due to side effects in the past. Afib/flutter could certainly worsen the patient's HFpEF, resulting in further exacerbation. Given that the patient has not tolerated AAT in the past, favor pursuing rate control strategy at this time. She likely requires long term rate control given her Afib. EP has been asked to comment on PPM threshold rate and optimal rate control strategy. Theoretically, a higher resting heart rate may be of benefit in HFpEF patients. Thus, reasonable to increase threshold of pacing. However, this patient's situation is nuanced given the presence of Afib, which predisposes her to RVR episodes. Thus, she requires rate control with BB or CCB. For mgmt of her Afib/flutter, EP finds it reasonable to continue her home atenolol at current dosing. If alternatives are desired, diltiazem 240 mg daily could be considered. HF and volume optimization should be required, which seems to have a positive effect on her HR.     EP Recommendations:  - Ok to increase PPM  to 75 bpm  - Plan to pursue rate control over rhythm control. Restarting home atenolol would be acceptable. Diltiazem 240 mg daily could be considered as an alternative.  - No plans to start AAT at this time, can be discussed further as outpatient  - AVN ablation could be considered as outpatient were a rate control strategy not adequate in controlling her symptoms. No acute intervention at this time  - Continue eliquis as AC  - Device interrogation  - Tele  - K>4, Mg>2    The patient states understanding and is agreeable with plan.     Thank you for allowing us to participate in the care of this patient.     The patient was discussed w/ Dr. Trent.  The above note reflects our joint plan.    Tito Salas, PGY-4  Cardiovascular Disease Fellow      I very much appreciated the opportunity to assess Amelia Michel in the hospital with CV Fellow Dr Salas and resident. I agree with the note above which  summarizes my findings and current recommendations. .      Please do not hesitate to contact my office if you have any questions or concerns.      Pj Trent MD  Cardiac Arrhythmia Service  [facility]  [phone]

## 2023-01-25 NOTE — PROGRESS NOTES
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    Today's Update  -Increased BID Lasix dose to 60mg in am, 80mg in pm  -discontinue atenolol  - EP consult to increase PPM threshold to 75    #Acute on chronic HFpEF (EF 55-60%, 12/5/22)  # Mildly reduced RV function  #History of cardiac arrest  #Severe TR  #Hypokalemia  Presented with SOB x2 weeks and decreased exercise tolerance. Recently hospitalized with HF hospitalization in s/o ectopy, right heart cath 12/3/22 CI 1.1, CO 1.6. Discharge weight 109 lbs, on Lasix 40mg BID, taking as prescribed but had reported a 10lb weight gain in 1 week.   - NT-proBNP: 2000  - Guideline-directed therapy for HFpEF as below:               - MRA: PTA spironolactone 25 mg qday               - SGLT2i: PTA empagliflozin 10 mg qday  - Lasix dose 60mg in am, increased to 80mg in pm given limited output in afternoon  - Discontinue atenolol to improve diastolic function  -Echo 12/5 showing severe TR and accounts in part of presentation of clinical right HF.   - Appreciate EP consult  - Strict I&O's with daily weight  - Low Na diet & 2 L fluid restriction  - Monitor K & Mg with goal K > 4 & Mg > 2    # AF with RVR  # Long-standing persistent Atrial flutter/fibrillation (on apixaban)  # NSVT s/p PVC ablation (11/30/2022)  # SSS s/p ppm (2019)   Presented with AF with RVR in the setting of ADHF, hemodynamically stable.   - Anticoagulation:  PTA apixaban 5 mg BID for Afib. CLPGK5BLCL = 2 (+ htn, + gender).  - HR at rest in ED in 80s, atenolol dc'd, continue to monitor    #Acute hyponatremia  - Na 132 (126 on 1/24)  - Likely hypervolemic hyponatremia in the setting of ADHF.  - Continue to monitor with daily BMP    # RA  - Continue PTA Plaquenil 200 mg daily and Prednisone 3 mg daily  - Continue PTA Gabapentin 100 mg BID and 200 mg at bedtime  - Holding PTA Alendronate and Calcium supplement, per policy    # History of Diffuse Large B-cell Lymphoma  #History of cardiac arrest  Underwent hospitalization 6/2017 due to VF arrest in setting of normal angiogram and found to have DLBCL complicated by masses on the coronary cusps and LVOT. S/p RCHOP and ECHOP therapy (total 5 rounds). No history of radiation therapy. Deemed in complete remission in 2019 and graduated out of surveillance at that time. SPEP and UPEP checked in 11/2022 with no evidence of amyloidosis.    FEN: 2g Na diet, 2L fluid restriction  Code status: Full  Prophylaxis:  SCDs, ambulation  Isolation: N/A  Disposition: pending diuresis     Patient seen and discussed with Dr. Walker, who agrees with above plan.    Yakelin Han, APRN, CNP  [facility] Cardiology Team  Pager [phone]    Interval History:  Pt states that she feels less swollen in lower extremities. No SOB, MONTALVO, or orthopnea.   Remains in ED pending floor availability      Physical Exam:  Temp:  [97.6  F (36.4  C)-98.2  F (36.8  C)] 97.9  F (36.6  C)  Pulse:  [102-116] 102  Resp:  [16-19] 16  BP: ()/(39-81) 98/46  SpO2:  [97 %-99 %] 98 %    I/O:    Intake/Output Summary (Last 24 hours) at 1/25/2023 1318  Last data filed at 1/25/2023 1312  Gross per 24 hour   Intake 200 ml   Output 1350 ml   Net -1150 ml         Wt:   Wt Readings from Last 5 Encounters:   01/25/23 54.7 kg (120 lb 8 oz)   01/09/23 53.2 kg (117 lb 3.2 oz)   12/21/22 49.8 kg (109 lb 12.8 oz)   12/19/22 49.4 kg (109 lb)   12/08/22 49.8 kg (109 lb 12.8 oz)          General: NAD  HEENT:  PERRLA, EOMI.   Neck: JVD not elevated.   CV: RRR. No murmur appreciated. No rubs or gallops. Peripheral radial pulse intact.  Resp: No increased work of breathing or use of accessory muscles, breathing comfortably on room air.  Lung sounds clear throughout/bilaterally  Abdomen:  Normal active bowel sounds.  Abdomen is soft. No distension, non-tender to palpation.    Extremities: Warm. Capillary refill less than 3 sec. 2/4 radial pulses bilaterally.  2/4 pedal pulses bilaterally. +1 lower extremity edema. No cyanosis or clubbing.  Skin:  Warm and dry. No erythema, rashes, ulceration or diaphoresis.  Neuro: Alert and oriented x3.      Medications:    apixaban ANTICOAGULANT  5 mg Oral BID     empagliflozin  10 mg Oral Daily     furosemide  60 mg Intravenous BID     gabapentin  100 mg Oral BID     gabapentin  200 mg Oral At Bedtime     hydroxychloroquine  200 mg Oral Daily     loratadine  10 mg Oral Daily     multivitamin w/minerals  1 tablet Oral Daily     predniSONE  3 mg Oral Daily     sodium chloride (PF)  3 mL Intracatheter Q8H     spironolactone  25 mg Oral Daily       - MEDICATION INSTRUCTIONS -       - MEDICATION INSTRUCTIONS -         Labs:   CMP  Recent Labs   Lab 23  0454 23  1631   * 126*   POTASSIUM 3.1* 4.0   CHLORIDE 93* 89*   CO2 23 21*   ANIONGAP 16* 16*   * 113*   BUN 32.1* 38.2*   CR 1.12 1.14   GFRESTIMATED 55* 54*   VIKTORIYA 9.3 9.2   MAG 2.1  --      CBC  Recent Labs   Lab 23  0454 23  1631   WBC 5.1 5.5   RBC 3.92 4.02   HGB 14.3 14.7   HCT 42.4 43.0   * 107*   MCH 36.5* 36.6*   MCHC 33.7 34.2   RDW 17.0* 17.0*   PLT 91* 91*     Diagnostics:  EC23 Atrial fibrillation       Echo: 22  Interpretation Summary  Severe tricuspid insufficiency is present.  Moderate right ventricular dilation is present. Global right ventricular  function is mildly reduced.  Global and regional left ventricular function is normal with  an EF of 55-60%.  IVC diameter >2.1 cm collapsing <50% with sniff suggests a high RA pressure  estimated at 15 mmHg or greater.  No pericardial effusion is present.  No significant changes noted.  ______________________________________________________________________________  Left Ventricle  Global and regional left ventricular function is normal with an EF of 55-60%.  Flattened septum is consistent with right ventricular volume overload.     Right Ventricle  Moderate right ventricular dilation is present. Global right ventricular  function is mildly reduced. A pacemaker lead is noted in the right ventricle.     Atria  Severe right atrial enlargement is present.     Mitral Valve  Moderate mitral annular calcification is present. Mild mitral insufficiency is  present.     Tricuspid Valve  Severe tricuspid insufficiency is present. The right ventricular systolic  pressure is approximated at 16.5 mmHg plus the right atrial pressure.     Pulmonic Valve  On Doppler interrogation, there is no significant stenosis or regurgitation.     Vessels  IVC diameter >2.1 cm collapsing <50% with sniff suggests a high RA pressure  estimated at 15 mmHg or greater. The inferior vena cava was dilated at 3.6 cm  without respiratory variability, consistent with increased right atrial  pressure.     Pericardium  No pericardial effusion is present.     Compared to Previous Study  No significant changes noted.         CXR 1/24/23:     FINDINGS: Two views of the chest. Postsurgical changes in the chest  with intact median sternotomy wires. Left chest wall cardiac pacemaker  with leads in stable position in the right atrium and right ventricle.  Trachea is midline. Stable enlargement of the cardiac silhouette.  Anterior mediastinal calcifications, better visualized on 9/10/2019.  No pleural effusion or pneumothorax. No focal airspace. The upper  abdomen is unremarkable. Stable S-shaped curvature and degenerative  changes of the thoracolumbar  spine.                                                                      IMPRESSION: Stable cardiomegaly without focal airspace disease.      Recent Results (from the past 24 hour(s))   XR Chest 2 Views    Narrative    EXAM: XR CHEST 2 VIEWS  1/24/2023 4:22 PM      HISTORY: SOA    COMPARISON: Chest x-ray 12/3/2022    FINDINGS: Two views of the chest. Postsurgical changes in the chest  with intact median sternotomy wires. Left chest wall cardiac pacemaker  with leads in stable position in the right atrium and right ventricle.  Trachea is midline. Stable enlargement of the cardiac silhouette.  Anterior mediastinal calcifications, better visualized on 9/10/2019.  No pleural effusion or pneumothorax. No focal airspace. The upper  abdomen is unremarkable. Stable S-shaped curvature and degenerative  changes of the thoracolumbar spine.      Impression    IMPRESSION: Stable cardiomegaly without focal airspace disease.    I have personally reviewed the examination and initial interpretation  and I agree with the findings.    ALINA HOWARD MD         SYSTEM ID:  [id number]   Cardiac Device Check - Remote   Result Value    Date Time Interrogation Session [id number]    Implantable Pulse Generator  Medtronic    Implantable Pulse Generator Model [id number] Addiseneida HYMAN DR MRI    Implantable Pulse Generator Serial Number [id number]    Type Interrogation Session Remote    Clinic Name [facility]    Implantable Pulse Generator Type Pacemaker    Implantable Pulse Generator Implant Date 20191213    Implantable Lead  Medtronic    Implantable Lead Model 5076 CapSureFix Novus MRI SureScan    Implantable Lead Serial Number [id number]    Implantable Lead Implant Date 20191213    Implantable Lead Polarity Type Bipolar Lead    Implantable Lead Location Detail 1 APPENDAGE    Implantable Lead Special Function Length 52 cm    Implantable Lead Location Right Atrium    Implantable Lead   Medtronic    Implantable Lead Model 5076 CapSureFix Nov MRI SureScan    Implantable Lead Serial Number [id number]    Implantable Lead Implant Date 20191213    Implantable Lead Polarity Type Bipolar Lead    Implantable Lead Location Detail 1 SEPTUM    Implantable Lead Special Function Length 58 cm    Implantable Lead Location Right Ventricle    Reyes Setting Mode (NBG Code) VVIR    Reyes Setting Lower Rate Limit 60    Reyes Setting Maximum Sensor Rate 130    Reyes Setting Hysterisis Rate DISABLED    Lead Channel Setting Sensing Polarity Bipolar    Lead Channel Setting Sensing Anode Location Right Atrium    Lead Channel Setting Sensing Anode Terminal Ring    Lead Channel Setting Sensing Cathode Location Right Atrium    Lead Channel Setting Sensing Cathode Terminal Tip    Lead Channel Setting Sensing Sensitivity Off    Lead Channel Setting Sensing Polarity Bipolar    Lead Channel Setting Sensing Anode Location Right Ventricle    Lead Channel Setting Sensing Anode Terminal Ring    Lead Channel Setting Sensing Cathode Location Right Ventricle    Lead Channel Setting Sensing Cathode Terminal Tip    Lead Channel Setting Sensing Sensitivity 0.9    Lead Channel Setting Pacing Polarity Bipolar    Lead Channel Setting Pacing Anode Location Right Ventricle    Lead Channel Setting Pacing Anode Terminal Ring    Lead Channel Setting Sensing Cathode Location Right Ventricle    Lead Channel Setting Sensing Cathode Terminal Tip    Lead Channel Setting Pacing Pulse Width 0.4    Lead Channel Setting Pacing Amplitude 2    Lead Channel Setting Pacing Capture Mode Adaptive    Zone Setting Type Category VF    Zone Setting Type Category VT    Zone Setting Type Category VT    Zone Setting Type Category VT    Zone Setting Detection Interval 400    Zone Setting Type Category ATRIAL_FIBRILLATION    Zone Setting Type Category AT/AF    Lead Channel Impedance Value 437    Lead Channel Impedance Value 342    Lead Channel Sensing Intrinsic  Amplitude 1.75    Lead Channel Sensing Intrinsic Amplitude 1    Lead Channel Impedance Value 437    Lead Channel Impedance Value 342    Lead Channel Sensing Intrinsic Amplitude 8.125    Lead Channel Sensing Intrinsic Amplitude 8.125    Lead Channel Pacing Threshold Amplitude 0.625    Lead Channel Pacing Threshold Pulse Width 0.4    Battery Date Time of Measurements 20230125101502    Battery Status OK    Battery RRT Trigger 2.625    Battery Remaining Longevity 132    Battery Voltage 3.03    Reyes Statistic Date Time Start 20221202073956    Reyes Statistic Date Time End 20230125113752    Reyes Statistic RA Percent Paced 0    Reyes Statistic RV Percent Paced 6.54    Reyes Statistic AP  Percent 0    Reyes Statistic AS  Percent 6.54    Reyes Statistic AP VS Percent 0    Reyes Statistic AS VS Percent 93.46    Episode Statistic Recent Count 0    Episode Statistic Type Category Patient Activated    Episode Statistic Recent Count 0    Episode Statistic Type Category SVT    Episode Statistic Recent Count 24    Episode Statistic Type Category VT    Episode Statistic Recent Count 0    Episode Statistic Type Category VT    Episode Statistic Recent Date Time Start 96576906219893    Episode Statistic Recent Date Time End 26045738615399    Episode Statistic Recent Date Time Start 25140321422006    Episode Statistic Recent Date Time End 27460480334010    Episode Statistic Recent Date Time Start 04148175290245    Episode Statistic Recent Date Time End 68986299826604    Episode Statistic Recent Date Time Start 01249442654232    Episode Statistic Recent Date Time End 20230125113752    Episode Statistic Total Count 3,691    Episode Statistic Type Category AT/AF    Episode Statistic Total Count 0    Episode Statistic Type Category Patient Activated    Episode Statistic Total Count 141    Episode Statistic Type Category SVT    Episode Statistic Total Count 23,983    Episode Statistic Type Category VT    Episode Statistic Total Count  209    Episode Statistic Type Category VT    Episode Statistic Total Date Time Start 20191213163058    Episode Statistic Total Date Time End 30489775976974    Episode Statistic Total Date Time Start 20191213163058    Episode Statistic Total Date Time End 12887134100292    Episode Statistic Total Date Time Start 20191213163058    Episode Statistic Total Date Time End 17060635807128    Episode Statistic Total Date Time Start 20191213163058    Episode Statistic Total Date Time End 15054281577964    Episode Statistic Total Date Time Start 20191213163058    Episode Statistic Total Date Time End 90418808823347    Episode Identifier 28,045    Episode Type Category VT    Episode Date Time 32493914323438    Episode Duration 2    Episode Identifier 28,044    Episode Type Category VT    Episode Date Time 54117326974901    Episode Duration 0    Episode Identifier 28,043    Episode Type Category VT    Episode Date Time 22231049639912    Episode Duration 1    Episode Identifier 28,042    Episode Type Category VT    Episode Date Time 49901906710997    Episode Duration 2    Episode Identifier 28,041    Episode Type Category VT    Episode Date Time 44950277135996    Episode Duration 2    Episode Identifier 28,040    Episode Type Category VT    Episode Date Time 40263749990534    Episode Duration 1    Episode Identifier 28,039    Episode Type Category VT    Episode Date Time 40680930459161    Episode Duration 2    Episode Identifier 28,038    Episode Type Category VT    Episode Date Time 15160160435010    Episode Duration 1    Episode Identifier 28,037    Episode Type Category VT    Episode Date Time 71688576113475    Episode Duration 1    Episode Identifier 28,036    Episode Type Category VT    Episode Date Time 31798627323069    Episode Duration 1    Episode Identifier 28,035    Episode Type Category VT    Episode Date Time 43953742369162    Episode Duration 1    Episode Identifier 28,034    Episode Type Category VT    Episode Date  Time 20221202163947    Episode Duration 0    Episode Identifier 28,033    Episode Type Category VT    Episode Date Time 20221202145026    Episode Duration 1    Episode Identifier 28,032    Episode Type Category VT    Episode Date Time 20221202134531    Episode Duration 1    Episode Identifier 28,031    Episode Type Category VT    Episode Date Time 20221202134450    Episode Duration 1    Narrative    Medtronic Carelink Express remote pacemaker transmission received from the ER and reviewed. Device transmission sent per MD orders.    Device: Medtronic [id number] Claudia XT DR MRI  Normal Device Function  Mode: VVIR  bpm  : 6.5%  Presenting EGM: VS @ ~ 98 bpm  Short V-V intervals: 0  Lead Trends Appear Stable: yes  Estimated battery longevity to RRT = 11 years  Anticoagulant: Eliquis  Ventricular Arrhythmia: 24 episodes recorded as NSVT - 1-2 sec, 158-218 bpm. The last episode was recorded on 1/7/23.     TAMMY David RN    Remote pacemaker transmission    I have reviewed and interpreted the device interrogation, settings, programming and nurse's summary. The device is functioning within normal device parameters. I agree with the current findings, assessment and plan.       Medical Decision Making       35 MINUTES SPENT BY ME on the date of service doing chart review, history, exam, documentation & further activities per the note.

## 2023-01-26 ENCOUNTER — ANCILLARY PROCEDURE (OUTPATIENT)
Dept: CARDIOLOGY | Facility: CLINIC | Age: 63
DRG: 291 | End: 2023-01-26
Attending: NURSE PRACTITIONER
Payer: COMMERCIAL

## 2023-01-26 LAB
ANION GAP SERPL CALCULATED.3IONS-SCNC: 11 MMOL/L (ref 7–15)
BASOPHILS # BLD AUTO: 0.1 10E3/UL (ref 0–0.2)
BASOPHILS NFR BLD AUTO: 1 %
BUN SERPL-MCNC: 31.4 MG/DL (ref 8–23)
CALCIUM SERPL-MCNC: 8.9 MG/DL (ref 8.8–10.2)
CHLORIDE SERPL-SCNC: 95 MMOL/L (ref 98–107)
CREAT SERPL-MCNC: 1.28 MG/DL (ref 0.51–1.17)
DEPRECATED HCO3 PLAS-SCNC: 26 MMOL/L (ref 22–29)
EOSINOPHIL # BLD AUTO: 0.1 10E3/UL (ref 0–0.7)
EOSINOPHIL NFR BLD AUTO: 1 %
ERYTHROCYTE [DISTWIDTH] IN BLOOD BY AUTOMATED COUNT: 17.4 % (ref 10–15)
GFR SERPL CREATININE-BSD FRML MDRD: 47 ML/MIN/1.73M2
GLUCOSE SERPL-MCNC: 79 MG/DL (ref 70–99)
HCT VFR BLD AUTO: 44.1 % (ref 35–53)
HGB BLD-MCNC: 14.5 G/DL (ref 11.7–17.7)
IMM GRANULOCYTES # BLD: 0 10E3/UL
IMM GRANULOCYTES NFR BLD: 1 %
LYMPHOCYTES # BLD AUTO: 0.7 10E3/UL (ref 0.8–5.3)
LYMPHOCYTES NFR BLD AUTO: 13 %
MAGNESIUM SERPL-MCNC: 2.3 MG/DL (ref 1.7–2.3)
MCH RBC QN AUTO: 36.3 PG (ref 26.5–33)
MCHC RBC AUTO-ENTMCNC: 32.9 G/DL (ref 31.5–36.5)
MCV RBC AUTO: 111 FL (ref 78–100)
MONOCYTES # BLD AUTO: 0.6 10E3/UL (ref 0–1.3)
MONOCYTES NFR BLD AUTO: 12 %
NEUTROPHILS # BLD AUTO: 3.8 10E3/UL (ref 1.6–8.3)
NEUTROPHILS NFR BLD AUTO: 72 %
NRBC # BLD AUTO: 0 10E3/UL
NRBC BLD AUTO-RTO: 0 /100
PLATELET # BLD AUTO: 92 10E3/UL (ref 150–450)
POTASSIUM SERPL-SCNC: 3.9 MMOL/L (ref 3.4–5.3)
RBC # BLD AUTO: 3.99 10E6/UL (ref 3.8–5.9)
SODIUM SERPL-SCNC: 132 MMOL/L (ref 136–145)
WBC # BLD AUTO: 5.2 10E3/UL (ref 4–11)

## 2023-01-26 PROCEDURE — 250N000013 HC RX MED GY IP 250 OP 250 PS 637

## 2023-01-26 PROCEDURE — 93280 PM DEVICE PROGR EVAL DUAL: CPT

## 2023-01-26 PROCEDURE — 214N000001 HC R&B CCU UMMC

## 2023-01-26 PROCEDURE — 250N000013 HC RX MED GY IP 250 OP 250 PS 637: Performed by: INTERNAL MEDICINE

## 2023-01-26 PROCEDURE — 250N000012 HC RX MED GY IP 250 OP 636 PS 637

## 2023-01-26 PROCEDURE — 85025 COMPLETE CBC W/AUTO DIFF WBC: CPT

## 2023-01-26 PROCEDURE — 99233 SBSQ HOSP IP/OBS HIGH 50: CPT | Mod: 25 | Performed by: NURSE PRACTITIONER

## 2023-01-26 PROCEDURE — 93280 PM DEVICE PROGR EVAL DUAL: CPT | Mod: 26 | Performed by: INTERNAL MEDICINE

## 2023-01-26 PROCEDURE — 250N000011 HC RX IP 250 OP 636: Performed by: NURSE PRACTITIONER

## 2023-01-26 PROCEDURE — 80048 BASIC METABOLIC PNL TOTAL CA: CPT

## 2023-01-26 PROCEDURE — 36415 COLL VENOUS BLD VENIPUNCTURE: CPT

## 2023-01-26 PROCEDURE — 83735 ASSAY OF MAGNESIUM: CPT | Performed by: NURSE PRACTITIONER

## 2023-01-26 RX ADMIN — Medication 1 TABLET: at 08:56

## 2023-01-26 RX ADMIN — APIXABAN 5 MG: 5 TABLET, FILM COATED ORAL at 08:56

## 2023-01-26 RX ADMIN — LORATADINE 10 MG: 10 TABLET ORAL at 08:56

## 2023-01-26 RX ADMIN — FUROSEMIDE 80 MG: 10 INJECTION, SOLUTION INTRAVENOUS at 18:04

## 2023-01-26 RX ADMIN — GABAPENTIN 100 MG: 100 CAPSULE ORAL at 13:44

## 2023-01-26 RX ADMIN — ACETAMINOPHEN 650 MG: 325 TABLET, FILM COATED ORAL at 17:14

## 2023-01-26 RX ADMIN — GABAPENTIN 100 MG: 100 CAPSULE ORAL at 08:57

## 2023-01-26 RX ADMIN — EMPAGLIFLOZIN 10 MG: 10 TABLET, FILM COATED ORAL at 08:56

## 2023-01-26 RX ADMIN — SPIRONOLACTONE 25 MG: 25 TABLET, FILM COATED ORAL at 08:56

## 2023-01-26 RX ADMIN — Medication 3 MG: at 08:56

## 2023-01-26 RX ADMIN — ACETAMINOPHEN 650 MG: 325 TABLET, FILM COATED ORAL at 04:00

## 2023-01-26 RX ADMIN — HYDROXYCHLOROQUINE SULFATE 200 MG: 200 TABLET, FILM COATED ORAL at 19:46

## 2023-01-26 RX ADMIN — ACETAMINOPHEN 650 MG: 325 TABLET, FILM COATED ORAL at 21:49

## 2023-01-26 RX ADMIN — APIXABAN 5 MG: 5 TABLET, FILM COATED ORAL at 19:46

## 2023-01-26 RX ADMIN — FUROSEMIDE 80 MG: 10 INJECTION, SOLUTION INTRAVENOUS at 08:58

## 2023-01-26 RX ADMIN — GABAPENTIN 200 MG: 100 CAPSULE ORAL at 21:49

## 2023-01-26 ASSESSMENT — ACTIVITIES OF DAILY LIVING (ADL)
ADLS_ACUITY_SCORE: 25

## 2023-01-26 NOTE — PROGRESS NOTES
CLINICAL NUTRITION SERVICES    Reason for Assessment:  Heart-healthy nutrition education, received consult.    Diet History:  Pt reports following low salt diet in the past but wanted clarification on exact amount of salt she should have each day.     Nutrition Diagnosis:  Food- and nutrition-related knowledge deficit r/t heart-healthy diet AEB pt report of questions about diet.    Nutrition Prescription/Recs:  Continue heart-healthy diet.      Interventions:  Nutrition Education  1. Provided verbal instruction on a heart-healthy diet.  2. Provided handouts: Heart Failure Nutrition Therapy and Seasoning Your Foods Without Adding Salt.      Goals:    1. Pt will verbalize at least four foods high in saturated or trans-fats and five foods high in sodium.    2. Pt will list at least two interventions to make current meal plan more heart-healthy.     Follow-up:   Patient to ask any further nutrition-related questions before discharge. In addition, pt may request outpatient RD appointment.    Bettina Barrios RDN, LD  [address]C RD pager: [phone]  Weekend/Holiday RD pager: [phone]

## 2023-01-26 NOTE — PROGRESS NOTES
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 Recently hospitalized with HF hospitalization in s/o ectopy, right heart cath 12/3/22 CI 1.1, CO 1.6. Discharge weight 109 lbs, on Lasix 40mg BID, taking as prescribed but had reported a 10lb weight gain in 1 week. Pt with known severe TR, most recent echo 12/5.    - Volume status: hypervolemic, continue Lasix 80 mg IV BID  - AA: Continue PTA Aldactone 25 mg daily   - SGLT2i: PTA empagliflozin 10 mg qday  - Strict I&O's with daily weight  - Low Na diet & 2 L fluid restriction  - Monitor K & Mg with goal K > 4 & Mg > 2  - CVTS consulted during prior hospitalization, patient currently would like to wait on any surgical repair of tricuspid valve      # AF with RVR  # Long-standing persistent Atrial flutter/fibrillation   # NSVT s/p PVC ablation (11/30/2022)  # SSS s/p ppm (2019)   Presented with AF with RVR in the setting of ADHF, hemodynamically stable.   - Anticoagulation: HTNPL2BBSQ 2, apixaban 5 mg BID  - Rate control: Discontinued PTA Atenolol given hx HFpEF, if sustained HR > 110, will plan to start Diltiazem  - EP consulted; PPM  threshold change to  (rather than )     # CELSA  Baseline Cr around 1.   - Cr 1.28 (1.0)  - Continue diuresis as listed above    # Chronic hyponatremia  Baseline appears to be around 127-132, was as low at 119 11/22. Na on admission 132. Likely component of hypervolemic hyponatremia.  - Na 132 (132)  - Diuresis as listed above     # RA  - Continue PTA Plaquenil 200 mg daily and Prednisone 3 mg daily  - Continue PTA Gabapentin 100 mg BID and 200 mg at bedtime  - Holding PTA Alendronate while IP     # History of Diffuse Large B-cell Lymphoma  # History of cardiac arrest  Prior hospitalization 6/2017 due to VF arrest with normal angiogram and found to have DLBCL complicated by masses on the coronary cusps and LVOT. S/p RCHOP and ECHOP therapy (total 5 rounds). No history of radiation therapy. Deemed in complete remission in 2019 and graduated out of surveillance at that time. SPEP and UPEP checked in 11/2022 with no evidence of amyloidosis.    FEN: 2 gm Sodium, 2 L fluid restriction  Code status: Full  Prophylaxis:  PO Eliquis  Isolation: Contact, ESBL  Disposition: discharge home 3-5 days pending volume status    Patient seen and discussed with Dr. Walker, who agrees with above plan.    Lani JOHNSON, CNP  [facility] Cardiology Team  Pager [phone]/ASCOM [phone]    Interval History:  - No acute events overnight  - Pt reports breathing feeling better, is now able to lay flat  - Continues to c/o BLE edema    Physical Exam:  Temp:  [97.7  F (36.5  C)-98  F (36.7  C)] 97.7  F (36.5  C)  Pulse:  [102-113] 108  Resp:  [16-19] 18  BP: ()/(39-80) 104/80  SpO2:  [93 %-99 %] 95 %    I/O:  Intake/Output Summary (Last 24 hours) at 1/26/2023 1120  Last data filed at 1/26/2023 1109  Gross per 24 hour   Intake 1240 ml   Output 2975 ml   Net -1735 ml         Wt:   Wt Readings from Last 5 Encounters:   01/26/23 53 kg (116 lb 14.4 oz)   01/09/23 53.2 kg (117 lb 3.2 oz)   12/21/22 49.8 kg (109 lb 12.8 oz)   12/19/22 49.4 kg (109  lb)   12/08/22 49.8 kg (109 lb 12.8 oz)       General: NAD  HEENT:  PERRLA, EOMI.   Neck: JVD elevated (likely 2/2 CHF and TR)  CV: irregularly irregular rhythm, mildly tachycardic rate, loud murmur present. No rubs or gallops. Peripheral radial pulse intact.  Resp: No increased work of breathing or use of accessory muscles, breathing comfortably on room air.  Lung sounds clear throughout/bilaterally  Abdomen:  Normal active bowel sounds.  Abdomen is soft. No distension, non-tender to palpation.    Extremities: Warm. Capillary refill less than 3 sec. 2/4 radial pulses bilaterally.  2/4 pedal pulses bilaterally. 3+ BLE edema. No cyanosis or clubbing.  Skin:  Warm and dry. No erythema, rashes, ulceration or diaphoresis.  Neuro: Alert and oriented x3.      Medications:    apixaban ANTICOAGULANT  5 mg Oral BID     empagliflozin  10 mg Oral Daily     furosemide  80 mg Intravenous BID     gabapentin  100 mg Oral BID     gabapentin  200 mg Oral At Bedtime     hydroxychloroquine  200 mg Oral Daily     loratadine  10 mg Oral Daily     multivitamin w/minerals  1 tablet Oral Daily     predniSONE  3 mg Oral Daily     sodium chloride (PF)  3 mL Intracatheter Q8H     spironolactone  25 mg Oral Daily       - MEDICATION INSTRUCTIONS -       - MEDICATION INSTRUCTIONS -         Labs:   CMP  Recent Labs   Lab 01/26/23  0606 01/25/23  1613 01/25/23  0454 01/24/23  1631   *  --  132* 126*   POTASSIUM 3.9 4.1 3.1* 4.0   CHLORIDE 95*  --  93* 89*   CO2 26  --  23 21*   ANIONGAP 11  --  16* 16*   GLC 79  --  106* 113*   BUN 31.4*  --  32.1* 38.2*   CR 1.28*  --  1.12 1.14   GFRESTIMATED 47*  --  55* 54*   VIKTORIYA 8.9  --  9.3 9.2   MAG 2.3  --  2.1  --      CBC  Recent Labs   Lab 01/26/23  0606 01/25/23  0454 01/24/23  1631   WBC 5.2 5.1 5.5   RBC 3.99 3.92 4.02   HGB 14.5 14.3 14.7   HCT 44.1 42.4 43.0   * 108* 107*   MCH 36.3* 36.5* 36.6*   MCHC 32.9 33.7 34.2   RDW 17.4* 17.0* 17.0*   PLT 92* 91* 91*     INRNo lab results found  in last 7 days.  Arterial Blood GasNo lab results found in last 7 days.    Diagnostics:  EC23 Atrial fibrillation        Echo: 22  Interpretation Summary  Severe tricuspid insufficiency is present.  Moderate right ventricular dilation is present. Global right ventricular  function is mildly reduced.  Global and regional left ventricular function is normal with an EF of 55-60%.  IVC diameter >2.1 cm collapsing <50% with sniff suggests a high RA pressure  estimated at 15 mmHg or greater.  No pericardial effusion is present.  No significant changes noted.    Recent Results (from the past 24 hour(s))   Cardiac Device Check - Remote   Result Value    Date Time Interrogation Session [id number]    Implantable Pulse Generator  Medtronic    Implantable Pulse Generator Model W1DR01 [place] XT DR MRI    Implantable Pulse Generator Serial Number [id number]    Type Interrogation Session Remote    Clinic Name [facility]    Implantable Pulse Generator Type Pacemaker    Implantable Pulse Generator Implant Date 2019    Implantable Lead  Medtronic    Implantable Lead Model 5076 CapSureFix Novus MRI SureScan    Implantable Lead Serial Number [id number]    Implantable Lead Implant Date 2019    Implantable Lead Polarity Type Bipolar Lead    Implantable Lead Location Detail 1 APPENDAGE    Implantable Lead Special Function Length 52 cm    Implantable Lead Location Right Atrium    Implantable Lead  Medtronic    Implantable Lead Model 5076 CapSureFix Novus MRI SureScan    Implantable Lead Serial Number [id number]    Implantable Lead Implant Date 2019    Implantable Lead Polarity Type Bipolar Lead    Implantable Lead Location Detail 1 SEPTUM    Implantable Lead Special Function Length 58 cm    Implantable Lead Location Right Ventricle    Reyes Setting Mode (NBG Code) VVIR    Reyes Setting Lower Rate Limit 60    Reyes Setting Maximum Sensor Rate 130     Reyes Setting Hysterisis Rate DISABLED    Lead Channel Setting Sensing Polarity Bipolar    Lead Channel Setting Sensing Anode Location Right Atrium    Lead Channel Setting Sensing Anode Terminal Ring    Lead Channel Setting Sensing Cathode Location Right Atrium    Lead Channel Setting Sensing Cathode Terminal Tip    Lead Channel Setting Sensing Sensitivity Off    Lead Channel Setting Sensing Polarity Bipolar    Lead Channel Setting Sensing Anode Location Right Ventricle    Lead Channel Setting Sensing Anode Terminal Ring    Lead Channel Setting Sensing Cathode Location Right Ventricle    Lead Channel Setting Sensing Cathode Terminal Tip    Lead Channel Setting Sensing Sensitivity 0.9    Lead Channel Setting Pacing Polarity Bipolar    Lead Channel Setting Pacing Anode Location Right Ventricle    Lead Channel Setting Pacing Anode Terminal Ring    Lead Channel Setting Sensing Cathode Location Right Ventricle    Lead Channel Setting Sensing Cathode Terminal Tip    Lead Channel Setting Pacing Pulse Width 0.4    Lead Channel Setting Pacing Amplitude 2    Lead Channel Setting Pacing Capture Mode Adaptive    Zone Setting Type Category VF    Zone Setting Type Category VT    Zone Setting Type Category VT    Zone Setting Type Category VT    Zone Setting Detection Interval 400    Zone Setting Type Category ATRIAL_FIBRILLATION    Zone Setting Type Category AT/AF    Lead Channel Impedance Value 437    Lead Channel Impedance Value 342    Lead Channel Sensing Intrinsic Amplitude 1.75    Lead Channel Sensing Intrinsic Amplitude 1    Lead Channel Impedance Value 437    Lead Channel Impedance Value 342    Lead Channel Sensing Intrinsic Amplitude 8.125    Lead Channel Sensing Intrinsic Amplitude 8.125    Lead Channel Pacing Threshold Amplitude 0.625    Lead Channel Pacing Threshold Pulse Width 0.4    Battery Date Time of Measurements 33110374359060    Battery Status OK    Battery RRT Trigger 2.625    Battery Remaining Longevity 132     Battery Voltage 3.03    Reyes Statistic Date Time Start 07529248845994    Reyes Statistic Date Time End 25228926967103    Reyes Statistic RA Percent Paced 0    Reyes Statistic RV Percent Paced 6.54    Reyes Statistic AP  Percent 0    Reyes Statistic AS  Percent 6.54    Reyes Statistic AP VS Percent 0    Reyes Statistic AS VS Percent 93.46    Episode Statistic Recent Count 0    Episode Statistic Type Category Patient Activated    Episode Statistic Recent Count 0    Episode Statistic Type Category SVT    Episode Statistic Recent Count 24    Episode Statistic Type Category VT    Episode Statistic Recent Count 0    Episode Statistic Type Category VT    Episode Statistic Recent Date Time Start 13798299748629    Episode Statistic Recent Date Time End 96160835102050    Episode Statistic Recent Date Time Start 34577494449990    Episode Statistic Recent Date Time End 42283145558538    Episode Statistic Recent Date Time Start 02131333554686    Episode Statistic Recent Date Time End 10195278375060    Episode Statistic Recent Date Time Start 31376885257191    Episode Statistic Recent Date Time End 88919695665883    Episode Statistic Total Count 3,691    Episode Statistic Type Category AT/AF    Episode Statistic Total Count 0    Episode Statistic Type Category Patient Activated    Episode Statistic Total Count 141    Episode Statistic Type Category SVT    Episode Statistic Total Count 23,983    Episode Statistic Type Category VT    Episode Statistic Total Count 209    Episode Statistic Type Category VT    Episode Statistic Total Date Time Start 20191213163058    Episode Statistic Total Date Time End 96129627406872    Episode Statistic Total Date Time Start 20191213163058    Episode Statistic Total Date Time End 50006649092867    Episode Statistic Total Date Time Start 20191213163058    Episode Statistic Total Date Time End 78493588413599    Episode Statistic Total Date Time Start 93369501640625    Episode Statistic Total  Date Time End 59504486247059    Episode Statistic Total Date Time Start 21661326721551    Episode Statistic Total Date Time End 24840587949032    Episode Identifier 28,045    Episode Type Category VT    Episode Date Time 90148551916895    Episode Duration 2    Episode Identifier 28,044    Episode Type Category VT    Episode Date Time 00509474577865    Episode Duration 0    Episode Identifier 28,043    Episode Type Category VT    Episode Date Time 31064441183193    Episode Duration 1    Episode Identifier 28,042    Episode Type Category VT    Episode Date Time 16567335017560    Episode Duration 2    Episode Identifier 28,041    Episode Type Category VT    Episode Date Time 81387141041460    Episode Duration 2    Episode Identifier 28,040    Episode Type Category VT    Episode Date Time 24108894763428    Episode Duration 1    Episode Identifier 28,039    Episode Type Category VT    Episode Date Time 89721061069809    Episode Duration 2    Episode Identifier 28,038    Episode Type Category VT    Episode Date Time 80909159764077    Episode Duration 1    Episode Identifier 28,037    Episode Type Category VT    Episode Date Time 94171573779571    Episode Duration 1    Episode Identifier 28,036    Episode Type Category VT    Episode Date Time 50435756120514    Episode Duration 1    Episode Identifier 28,035    Episode Type Category VT    Episode Date Time 87591287206761    Episode Duration 1    Episode Identifier 28,034    Episode Type Category VT    Episode Date Time 56504397581493    Episode Duration 0    Episode Identifier 28,033    Episode Type Category VT    Episode Date Time 09196230716387    Episode Duration 1    Episode Identifier 28,032    Episode Type Category VT    Episode Date Time 79068097366308    Episode Duration 1    Episode Identifier 28,031    Episode Type Category VT    Episode Date Time 86689188044565    Episode Duration 1    Narrative    [employer] Carelink Express remote pacemaker transmission received  from the ER and reviewed. Device transmission sent per MD orders.    Device: Medtronic W1DR01 [place] XT DR MRI  Normal Device Function  Mode: VVIR  bpm  : 6.5%  Presenting EGM: VS @ ~ 98 bpm  Short V-V intervals: 0  Lead Trends Appear Stable: yes  Estimated battery longevity to RRT = 11 years  Anticoagulant: Eliquis  Ventricular Arrhythmia: 24 episodes recorded as NSVT - 1-2 sec, 158-218 bpm. The last episode was recorded on 1/7/23.     TAMMY David RN    Remote pacemaker transmission    I have reviewed and interpreted the device interrogation, settings, programming and nurse's summary. The device is functioning within normal device parameters. I agree with the current findings, assessment and plan.       Medical Decision Making       40 MINUTES SPENT BY ME on the date of service doing chart review, history, exam, documentation & further activities per the note.      Attending Attestation: I have personally seen and evaluated this patient as part of a shared APRN/PA visit with Lani Novak. I personally obtained and reviewed the history, exam, and made the pertinent medical decisions which are accurately recorded. My key management decisions carried out under my direction include heart rate management and diuresis. My additional findings, if any, have been incorporated into the body of the note. All labs, imaging studies, ECG and telemetry data have been reviewed. The assessment and plan outlined reflect our joint decision and include my key treatment recommendations and/or coordination of care that I summarized and shared with the patient.

## 2023-01-26 NOTE — PROGRESS NOTES
Admission          1/24/2023  3:46 PM  -----------------------------------------------------------  Reason for admission: CHF  Primary team notified of pt arrival.  Admitted from: ED  Via: stretcher  Accompanied by: self  Belongings: Placed in closet  Admission Profile: complete  Teaching: orientation to unit and call light- call light within reach, call don't fall, use of console, meal times, when to call for the RN, and enforced importance of safety   Access: PIV x1  Telemetry: Placed on pt  Ht./Wt.: complete  Code Status verified on armband: yes/no  2 RN Skin Assessment Completed By: Becky RN and Haven RN  Med Rec completed: yes  Suction/Ambu bag/Flowmeter at bedside: yes

## 2023-01-26 NOTE — DISCHARGE INSTRUCTIONS
Take your medicines every day, as directed       Monitor Your Weight and Symptoms    Contact us if you:    Gain 2 pounds in one day or 5 pounds in one week  Feel more short of breath  Notice more leg swelling  Feel lightheadeded   Change your lifestyle    Limit Salt or Sodium:  2000 mg  Limit Fluids:  2000 mL or approximately 64 ounces  Eat a Heart Healthy Diet  Low in saturated fats  Stay Active:  Aim to move at least 150 minutes every  week         To Contact us    During Business Hours:  [phone], option # 1      After hours, weekends or holidays:   [phone], Option #4  Ask to speak to the On-Call Cardiologist. Inform them you are a CORE/heart failure patient at the [facility].     Use [employer] allows you to communicate directly with your heart team through secure messaging.  [employer] can be accessed any time on your phone, computer, or tablet.  If you need assistance, we'd be happy to help!         Keep your Heart Appointments:    2/8/23--2:45: Lab appointment            --3:25: Clinic appointment with provider Elmira Vasquez.    Appointments are at the Clinic and [facility] located at [address] [place] [address].     Please consider attending our virtual support group which is held monthly. Please reach out to Jesus at [phone] for more information if you are interested in attending.       2023 dates:    Monday, February 6th , 1-2pm  Monday, March 6th , 1-2pm  Monday, April 3rd, 1-2pm  Monday, May 1st, 1-2pm  Monday, June 5th, 1-2pm  Monday, July 3rd, 1-2pm  Monday, August 7th, 1-2pm  Monday, September 11th, 1-2pm  Monday, October 2nd, 1-2pm  Monday, November 6th, 1-2pm  Monday, December 4th, 1-2pm

## 2023-01-26 NOTE — PROGRESS NOTES
SPIRITUAL HEALTH SERVICES  SPIRITUAL ASSESSMENT Progress Note  [facility] 6C     REFERRAL SOURCE: Admission Request    Pt recognized  from previous visit.  She shared that she is frustrated she is back in the hospital again, for the same issue.  She talked about how her , who is her main support system, is also caring for an elderly mother and it is challenging for him to be fully present for her as well.  Pt said her helen is sustaining her as she navigates this hospitalization and welcomed prayer from  for healing and peace.    PLAN: SHS will remain available     Gala Martin  Pager: [phone]
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
## 2023-01-26 NOTE — PLAN OF CARE
Goal Outcome Evaluation:    Neuro: A&Ox4.   Cardiac: SR. VSS.   Respiratory: Sating 98% on RA.  GI/: Adequate urine output. No Bm at this shift. +ve bowel sounds, denied nausea or vomiting.   Diet/appetite: Tolerating regular diet. Eating well.  Activity:  independently up to chair and bedside commode.   Pain: At acceptable level on current regimen.   Skin: No new deficits noted.  LDA's: PIV patent and functioning.   Plan: Continue with POC. Notify primary team with changes.

## 2023-01-26 NOTE — PLAN OF CARE
Goal Outcome Evaluation:      Plan of Care Reviewed With: patient    Overall Patient Progress: improvingOverall Patient Progress: improving    Outcome Evaluation: completed low sodium heart failure diet education with patient.    Bettina Barrios RDN,   6C RD pager: [phone]  Weekend/Holiday RD pager: [phone]

## 2023-01-26 NOTE — CONSULTS
Amelia is already an established patient in the CORE/Heart Failure clinic.  Last appt with was Elmira Pedro in [facility] on 23.  Patient has a Return CORE/heart failure appt with Elmira Vasquez on 23 at 3:15, with labs at 2:45.       Amelia has been instructed on the importance of daily weights, 2 gm sodium diet, 2L fluid restriction and compliance with medications upon hospital discharge.  She has been instructed to call with any questions or concerns, including any weight gain or loss of 2 or more pounds in 24 hours or 5 or more pounds in 1 week. A nurse care coordinator will follow-up with Amelia at the time of appt, sooner if needed.  Thank you for the consult.       Christos Jeter RN  Cardiology Care Coordinator - [facility]   Questions and [phone]

## 2023-01-27 LAB
ANION GAP SERPL CALCULATED.3IONS-SCNC: 10 MMOL/L (ref 7–15)
BASOPHILS # BLD AUTO: 0 10E3/UL (ref 0–0.2)
BASOPHILS NFR BLD AUTO: 1 %
BUN SERPL-MCNC: 26.8 MG/DL (ref 8–23)
CALCIUM SERPL-MCNC: 8.5 MG/DL (ref 8.8–10.2)
CHLORIDE SERPL-SCNC: 98 MMOL/L (ref 98–107)
CREAT SERPL-MCNC: 1.14 MG/DL (ref 0.51–1.17)
DEPRECATED HCO3 PLAS-SCNC: 25 MMOL/L (ref 22–29)
EOSINOPHIL # BLD AUTO: 0.1 10E3/UL (ref 0–0.7)
EOSINOPHIL NFR BLD AUTO: 2 %
ERYTHROCYTE [DISTWIDTH] IN BLOOD BY AUTOMATED COUNT: 17.5 % (ref 10–15)
GFR SERPL CREATININE-BSD FRML MDRD: 54 ML/MIN/1.73M2
GLUCOSE SERPL-MCNC: 85 MG/DL (ref 70–99)
HCT VFR BLD AUTO: 42.3 % (ref 35–53)
HGB BLD-MCNC: 14 G/DL (ref 11.7–17.7)
IMM GRANULOCYTES # BLD: 0 10E3/UL
IMM GRANULOCYTES NFR BLD: 0 %
LYMPHOCYTES # BLD AUTO: 0.5 10E3/UL (ref 0.8–5.3)
LYMPHOCYTES NFR BLD AUTO: 11 %
MAGNESIUM SERPL-MCNC: 2.3 MG/DL (ref 1.7–2.3)
MCH RBC QN AUTO: 36.2 PG (ref 26.5–33)
MCHC RBC AUTO-ENTMCNC: 33.1 G/DL (ref 31.5–36.5)
MCV RBC AUTO: 109 FL (ref 78–100)
MONOCYTES # BLD AUTO: 0.6 10E3/UL (ref 0–1.3)
MONOCYTES NFR BLD AUTO: 14 %
NEUTROPHILS # BLD AUTO: 3.2 10E3/UL (ref 1.6–8.3)
NEUTROPHILS NFR BLD AUTO: 72 %
NRBC # BLD AUTO: 0 10E3/UL
NRBC BLD AUTO-RTO: 0 /100
PLATELET # BLD AUTO: 84 10E3/UL (ref 150–450)
POTASSIUM SERPL-SCNC: 3.5 MMOL/L (ref 3.4–5.3)
RBC # BLD AUTO: 3.87 10E6/UL (ref 3.8–5.9)
SODIUM SERPL-SCNC: 133 MMOL/L (ref 136–145)
WBC # BLD AUTO: 4.5 10E3/UL (ref 4–11)

## 2023-01-27 PROCEDURE — 80048 BASIC METABOLIC PNL TOTAL CA: CPT

## 2023-01-27 PROCEDURE — 250N000013 HC RX MED GY IP 250 OP 250 PS 637: Performed by: CASE MANAGER/CARE COORDINATOR

## 2023-01-27 PROCEDURE — 36415 COLL VENOUS BLD VENIPUNCTURE: CPT

## 2023-01-27 PROCEDURE — 83735 ASSAY OF MAGNESIUM: CPT | Performed by: INTERNAL MEDICINE

## 2023-01-27 PROCEDURE — 250N000011 HC RX IP 250 OP 636: Performed by: NURSE PRACTITIONER

## 2023-01-27 PROCEDURE — 214N000001 HC R&B CCU UMMC

## 2023-01-27 PROCEDURE — 85025 COMPLETE CBC W/AUTO DIFF WBC: CPT

## 2023-01-27 PROCEDURE — 250N000013 HC RX MED GY IP 250 OP 250 PS 637

## 2023-01-27 PROCEDURE — 99233 SBSQ HOSP IP/OBS HIGH 50: CPT | Mod: 24 | Performed by: CASE MANAGER/CARE COORDINATOR

## 2023-01-27 PROCEDURE — 250N000012 HC RX MED GY IP 250 OP 636 PS 637

## 2023-01-27 PROCEDURE — 250N000013 HC RX MED GY IP 250 OP 250 PS 637: Performed by: INTERNAL MEDICINE

## 2023-01-27 RX ORDER — POTASSIUM CHLORIDE 750 MG/1
20 TABLET, EXTENDED RELEASE ORAL ONCE
Status: COMPLETED | OUTPATIENT
Start: 2023-01-27 | End: 2023-01-27

## 2023-01-27 RX ORDER — DILTIAZEM HYDROCHLORIDE 120 MG/1
120 CAPSULE, COATED, EXTENDED RELEASE ORAL DAILY
Status: DISCONTINUED | OUTPATIENT
Start: 2023-01-27 | End: 2023-01-30

## 2023-01-27 RX ORDER — FUROSEMIDE 10 MG/ML
80 INJECTION INTRAMUSCULAR; INTRAVENOUS 2 TIMES DAILY
Status: DISCONTINUED | OUTPATIENT
Start: 2023-01-27 | End: 2023-01-29

## 2023-01-27 RX ADMIN — GABAPENTIN 200 MG: 100 CAPSULE ORAL at 22:00

## 2023-01-27 RX ADMIN — FUROSEMIDE 80 MG: 10 INJECTION, SOLUTION INTRAVENOUS at 10:25

## 2023-01-27 RX ADMIN — Medication 3 MG: at 10:20

## 2023-01-27 RX ADMIN — GABAPENTIN 100 MG: 100 CAPSULE ORAL at 16:06

## 2023-01-27 RX ADMIN — ACETAMINOPHEN 650 MG: 325 TABLET, FILM COATED ORAL at 21:55

## 2023-01-27 RX ADMIN — LORATADINE 10 MG: 10 TABLET ORAL at 10:20

## 2023-01-27 RX ADMIN — GABAPENTIN 100 MG: 100 CAPSULE ORAL at 10:20

## 2023-01-27 RX ADMIN — APIXABAN 5 MG: 5 TABLET, FILM COATED ORAL at 10:20

## 2023-01-27 RX ADMIN — DILTIAZEM HYDROCHLORIDE 120 MG: 120 CAPSULE, COATED, EXTENDED RELEASE ORAL at 10:20

## 2023-01-27 RX ADMIN — HYDROXYCHLOROQUINE SULFATE 200 MG: 200 TABLET, FILM COATED ORAL at 20:07

## 2023-01-27 RX ADMIN — SPIRONOLACTONE 25 MG: 25 TABLET, FILM COATED ORAL at 10:20

## 2023-01-27 RX ADMIN — POTASSIUM CHLORIDE 20 MEQ: 750 TABLET, EXTENDED RELEASE ORAL at 10:20

## 2023-01-27 RX ADMIN — APIXABAN 5 MG: 5 TABLET, FILM COATED ORAL at 20:07

## 2023-01-27 RX ADMIN — EMPAGLIFLOZIN 10 MG: 10 TABLET, FILM COATED ORAL at 10:20

## 2023-01-27 RX ADMIN — Medication 1 TABLET: at 10:20

## 2023-01-27 RX ADMIN — ACETAMINOPHEN 650 MG: 325 TABLET, FILM COATED ORAL at 05:02

## 2023-01-27 RX ADMIN — FUROSEMIDE 80 MG: 10 INJECTION, SOLUTION INTRAVENOUS at 17:51

## 2023-01-27 ASSESSMENT — ACTIVITIES OF DAILY LIVING (ADL)
ADLS_ACUITY_SCORE: 25

## 2023-01-27 NOTE — PROGRESS NOTES
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 Recently hospitalized with HF hospitalization in s/o ectopy, right heart cath 12/3/22 CI 1.1, CO 1.6. Discharge weight 109 lbs, on Lasix 40mg BID, taking as prescribed but had reported a 10lb weight gain in 1 week. Pt with known severe TR, most recent echo 12/5.    - Volume status: hypervolemic, continue Lasix 80 mg IV BID  - AA: Continue PTA Aldactone 25 mg daily   - SGLT2i: PTA empagliflozin 10 mg qday  - Strict I&O's with daily weight  - Low Na diet & 2 L fluid restriction  - Monitor K & Mg with goal K > 4 & Mg > 2  - CVTS consulted during prior hospitalization, patient currently would like to wait on any surgical repair of tricuspid valve     # AF with RVR  # Long-standing persistent Atrial flutter/fibrillation   # NSVT s/p PVC ablation (11/30/2022)  # SSS s/p ppm (2019)   Presented with AF with RVR in the setting of ADHF, hemodynamically stable.   - Anticoagulation: [id number] 2, apixaban 5 mg BID  - Rate control: Discontinued PTA Atenolol given hx HFpEF, started Diltiazem ER 120mg daily  - EP consulted; PPM  threshold change to  (rather than )    # CELSA  Baseline Cr around 1.   - Cr 1.14 (1.28)  - Continue diuresis as listed above    # Chronic hyponatremia  Baseline appears to be around 127-132, was as low at 119 11/22. Na on admission 132. Likely component of hypervolemic hyponatremia.  - Na 132 (132)  -2L fluid restricted diet  - Diuresis as listed above    # RA  - Continue PTA Plaquenil 200 mg daily and Prednisone 3 mg daily  - Continue PTA Gabapentin 100 mg BID and 200 mg at bedtime  - Holding PTA Alendronate while IP    # History of Diffuse Large B-cell Lymphoma  # History of cardiac arrest  Prior hospitalization 6/2017 due to VF arrest with normal angiogram and found to have DLBCL complicated by masses on the coronary cusps and LVOT. S/p RCHOP and ECHOP therapy (total 5 rounds). No history of radiation therapy. Deemed in complete remission in 2019 and graduated out of surveillance at that time. SPEP and UPEP checked in 11/2022 with no evidence of amyloidosis.    FEN: 2 gm Sodium, 2 L fluid restriction  Code status: Full  Prophylaxis:  PO Eliquis  Isolation: Contact, ESBL  Disposition: discharge home 3-5 days pending volume status    Patient seen and discussed with Dr. Walker, who agrees with above plan.    ELIZABETH Alonso, CNP  [facility] Cardiology Team  Pager [phone]/[phone]    Interval History:  -No acute events overnight  -Pt reports breathing feels better  -Continues to endorse B/L LE edema    Physical Exam:  Temp:  [97.2  F (36.2  C)-98.3  F (36.8  C)] 97.2  F (36.2  C)  Pulse:  [102-115] 114  Resp:  [16-18] 18  BP: ()/(71-93) 108/85  SpO2:  [96 %-98 %] 98 %    I/O:    Wt:   Wt Readings from Last 5 Encounters:   01/27/23 52.1 kg (114 lb 14.4 oz)   01/09/23 53.2 kg (117 lb 3.2 oz)   12/21/22 49.8 kg (109 lb 12.8 oz)   12/19/22 49.4 kg (109 lb)   12/08/22 49.8 kg (109 lb 12.8 oz)         General: NAD  HEENT:  PERRLA, EOMI.   Neck: JVD elevated, likely 2/2 TR.   CV: Irregularly irregular rhythm,  loud murmur appreciated at L & R upper sternal borders. No rubs or gallops. Peripheral radial pulse intact.  Resp: No increased work of breathing or use of accessory muscles, breathing comfortably on room air.  Lung sounds clear throughout/bilaterally  Abdomen:  Normal active bowel sounds.  Abdomen is soft. No distension, non-tender to palpation.    Extremities: Warm. Capillary refill less than 3 sec. 2/4 radial pulses bilaterally.  2/4 pedal pulses bilaterally. +2 pitting B/L LE edema. No cyanosis or clubbing.  Skin:  Warm and dry. No erythema, rashes, ulceration or diaphoresis.  Neuro: Alert and oriented x3.      Medications:    apixaban ANTICOAGULANT  5 mg Oral BID     diltiazem ER COATED BEADS  120 mg Oral Daily     empagliflozin  10 mg Oral Daily     furosemide  80 mg Intravenous BID     gabapentin  100 mg Oral BID     gabapentin  200 mg Oral At Bedtime     hydroxychloroquine  200 mg Oral Daily     loratadine  10 mg Oral Daily     multivitamin w/minerals  1 tablet Oral Daily     predniSONE  3 mg Oral Daily     sodium chloride (PF)  3 mL Intracatheter Q8H     spironolactone  25 mg Oral Daily       - MEDICATION INSTRUCTIONS -       - MEDICATION INSTRUCTIONS -         Labs:   CMP  Recent Labs   Lab 01/27/23  0801 01/26/23  0606 01/25/23  1613 01/25/23  0454 01/24/23  1631   * 132*  --  132* 126*   POTASSIUM 3.5 3.9 4.1 3.1* 4.0   CHLORIDE 98 95*  --  93* 89*   CO2 25 26  --  23 21*   ANIONGAP 10 11  --  16* 16*   GLC 85 79  --  106* 113*   BUN 26.8* 31.4*  --  32.1* 38.2*   CR 1.14 1.28*  --  1.12 1.14   GFRESTIMATED 54* 47*  --  55* 54*   VIKTORIYA 8.5* 8.9  --  9.3 9.2   MAG 2.3 2.3  --  2.1  --      CBC  Recent Labs   Lab 01/27/23  0801 01/26/23  0606 01/25/23  0454 01/24/23  1631   WBC 4.5 5.2 5.1 5.5   RBC 3.87 3.99 3.92 4.02   HGB 14.0 14.5 14.3 14.7   HCT 42.3 44.1 42.4 43.0   * 111* 108* 107*   MCH 36.2* 36.3* 36.5* 36.6*   MCHC 33.1 32.9 33.7 34.2   RDW 17.5* 17.4* 17.0* 17.0*   PLT 84* 92* 91* 91*      INRNo lab results found in last 7 days.  Arterial Blood GasNo lab results found in last 7 days.    Diagnostics:  EC23 Atrial fibrillation        Echo: 22  Interpretation Summary  Severe tricuspid insufficiency is present.  Moderate right ventricular dilation is present. Global right ventricular  function is mildly reduced.  Global and regional left ventricular function is normal with an EF of 55-60%.  IVC diameter >2.1 cm collapsing <50% with sniff suggests a high RA pressure  estimated at 15 mmHg or greater.  No pericardial effusion is present.  No significant changes noted.    Device check:  Recent Results (from the past 24 hour(s))   Cardiac Device Check - Inpatient   Result Value    Date Time Interrogation Session [id number]    Implantable Pulse Generator  Medtronic    Implantable Pulse Generator Model [id number] Claudia XT  MRI    Implantable Pulse Generator Serial Number [id number]    Type Interrogation Session In Clinic    Clinic Name [facility]    Implantable Pulse Generator Type Pacemaker    Implantable Pulse Generator Implant Date 2019    Implantable Lead  Medtronic    Implantable Lead Model 5076 CapSureFix Novus MRI SureScan    Implantable Lead Serial Number [id number]    Implantable Lead Implant Date 2019    Implantable Lead Polarity Type Bipolar Lead    Implantable Lead Location Detail 1 APPENDAGE    Implantable Lead Special Function Length 52 cm    Implantable Lead Location Right Atrium    Implantable Lead  Medtronic    Implantable Lead Model 5076 CapSureFix Novus MRI SureScan    Implantable Lead Serial Number [id number]    Implantable Lead Implant Date 2019    Implantable Lead Polarity Type Bipolar Lead    Implantable Lead Location Detail 1 SEPTUM    Implantable Lead Special Function Length 58 cm    Implantable Lead Location Right Ventricle    Reyes Setting Mode (NBG Code) VVIR    Reyes Setting Lower Rate  Limit 75    Reyes Setting Maximum Sensor Rate 130    Reyes Setting Hysterisis Rate DISABLED    Lead Channel Setting Sensing Polarity Bipolar    Lead Channel Setting Sensing Anode Location Right Atrium    Lead Channel Setting Sensing Anode Terminal Ring    Lead Channel Setting Sensing Cathode Location Right Atrium    Lead Channel Setting Sensing Cathode Terminal Tip    Lead Channel Setting Sensing Sensitivity Off    Lead Channel Setting Sensing Polarity Bipolar    Lead Channel Setting Sensing Anode Location Right Ventricle    Lead Channel Setting Sensing Anode Terminal Ring    Lead Channel Setting Sensing Cathode Location Right Ventricle    Lead Channel Setting Sensing Cathode Terminal Tip    Lead Channel Setting Sensing Sensitivity 0.9    Lead Channel Setting Pacing Polarity Bipolar    Lead Channel Setting Pacing Anode Location Right Ventricle    Lead Channel Setting Pacing Anode Terminal Ring    Lead Channel Setting Sensing Cathode Location Right Ventricle    Lead Channel Setting Sensing Cathode Terminal Tip    Lead Channel Setting Pacing Pulse Width 0.4    Lead Channel Setting Pacing Amplitude 2    Lead Channel Setting Pacing Capture Mode Adaptive    Zone Setting Type Category VF    Zone Setting Type Category VT    Zone Setting Type Category VT    Zone Setting Type Category VT    Zone Setting Detection Interval 400    Zone Setting Type Category ATRIAL_FIBRILLATION    Zone Setting Type Category AT/AF    Lead Channel Impedance Value 437    Lead Channel Impedance Value 342    Lead Channel Impedance Value 437    Lead Channel Impedance Value 342    Lead Channel Sensing Intrinsic Amplitude 7.3    Lead Channel Pacing Threshold Amplitude 0.75    Lead Channel Pacing Threshold Pulse Width 0.4    Battery Date Time of Measurements 95311797607023    Battery Status OK    Battery RRT Trigger 2.625    Battery Remaining Longevity 132    Battery Voltage 3.03    Reyes Statistic Date Time Start [id number]    Reyes Statistic Date  Time End 50027093763213    Reyes Statistic RA Percent Paced 0    Reyes Statistic RV Percent Paced 0.26    Reyes Statistic AP  Percent 0    Reyes Statistic AS  Percent 0.26    Reyes Statistic AP VS Percent 0    Reyes Statistic AS VS Percent 99.74    Episode Statistic Recent Count 0    Episode Statistic Type Category AT/AF    Episode Statistic Recent Count 1    Episode Statistic Type Category VT    Episode Statistic Recent Count 0    Episode Statistic Type Category VT    Episode Statistic Recent Date Time Start 62923655361697    Episode Statistic Recent Date Time End 43131604416467    Episode Statistic Recent Date Time Start 67548453487517    Episode Statistic Recent Date Time End 79884742844303    Episode Statistic Recent Date Time Start 19123748169026    Episode Statistic Recent Date Time End 70206628542012    Episode Statistic Total Count 3,691    Episode Statistic Type Category AT/AF    Episode Statistic Total Count 23,984    Episode Statistic Type Category VT    Episode Statistic Total Count 209    Episode Statistic Type Category VT    Episode Statistic Total Date Time Start 67791964755624    Episode Statistic Total Date Time End 36848222417701    Episode Statistic Total Date Time Start 20191213163058    Episode Statistic Total Date Time End 83693143080955    Episode Statistic Total Date Time Start 20191213163058    Episode Statistic Total Date Time End 47274410912897    Episode Identifier 28,046    Episode Type Category VT    Episode Date Time [id number]    Episode Duration 1    Narrative    Patient seen in [address] [place] [address] for evaluation and iterative programming of their pacemaker per MD orders. Order received to increase patient's LRL from 60 bpm to 75 bpm.    Device: Medtronic W1DR01 [place] XT DR GALLEGOS  Normal Device Function.   Mode: VVIR  bpm --> VVIR  bpm  : 0.3%  Intrinsic rhythm: AT/AFL w/ VS @ 115 bpm  Short V-V intervals: 0.  Lead Trends Appear Stable: Yes  Estimated battery longevity to RRT  = 10.9 years. Battery voltage = 3.03 V.  Atrial arrhythmia: Chronic AF  AF burden: N/R  Anticoagulant: Eliquis  Ventricular Arrhythmia: 1 VT-NS episode recorded 1/26/23 at 7:38 AM, lasting 1 seconds at 154 bpm. EGM reveals tachycardia with morphology similar to intrinsic, but slightly wider, suggestive of VT vs AF w/ RVR.  PVC Singles = 2.9 per hour  Setting changes: LRL increased from 60 to 75 bpm per Bettina Novak CNP.     Plan: Continue inpatient evaluation. Upon discharge, patient has an appt to see Dr. Eaton on 2/1/23.  HILARY Pacheco RN    Dual lead pacemaker    I have reviewed and interpreted the device interrogation, settings, programming and nurse's summary. The device is functioning within normal device parameters. I agree with the current findings, assessment and plan.       Medical Decision Making       35 MINUTES SPENT BY ME on the date of service doing chart review, history, exam, documentation & further activities per the note.      Attending Attestation: I have personally seen and evaluated this patient as part of a shared APRN/PA visit with Yakelin Han. I personally obtained and reviewed the history, exam, and made the pertinent medical decisions which are accurately recorded. My key management decisions carried out under my direction include to continue IV diuresis.  My additional findings, if any, have been incorporated into the body of the note. All labs, imaging studies, ECG and telemetry data have been reviewed. The assessment and plan outlined reflect our joint decision and include my key treatment recommendations and/or coordination of care that I summarized and shared with the patient.  - Home diuresis with torsemide was discussed  - Potential benefits of tricuspid valve repair reiterated

## 2023-01-27 NOTE — PLAN OF CARE
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  VS:VSS  IV:PIV-SL  Tubes:NA  Neuro:A/Ox4  Resp:Denies shortness of breath, even with activity, lungs clear, on RA  GI/:Voiding well after IV lasix, stated had BM this AM  Nutrition:Good PO intake, aware of fluid restriction  Endo:NA  Skin:right arm with multiple bruises, unable to use left arm due to prior vascular surgery  Activity:Up independently to commode, bathroom, and in halls with walker.  Pain:denies pain.  Social: present, has cell phone at bedside.  Plan:continue diuresis, discharge to home after. Continue current cares and notify providers with questions or concerns.

## 2023-01-27 NOTE — PROGRESS NOTES
6C Shift Note [id number]    Neuro: A&Ox4.  Cardiac: A fib rates . VSS.   Respiratory: Sating >92% on RA.  GI/: Adequate urine output. +BS, passing flatus  Diet/appetite: Tolerating 2g Na diet. Eating well. 2000mL FR  Activity: Standby assist. Independent up to commode  Pain: At acceptable level on current regimen. PRN tylenol for shoulder pain with relief  Skin: No new deficits noted. RUE bruising.  LDA's: PIV SL    Plan: IV diuresis. Continue with POC. Notify primary team with changes.

## 2023-01-27 NOTE — PLAN OF CARE
Temp: 97.8  F (36.6  C) Temp src: Oral BP: 90/75 Pulse: 104   Resp: 18 SpO2: 97 % O2 Device: None (Room air)      Pt alert, oriented. Afib. BPs occasionally on softer side, sBP of 90s but MAPs adequate. Pacer check done today, per pt rate set at 75 instead of 60. Lasix 80mg IV given at 1800. Using commode at bedside independently. Appetite good, following 2L FR. PIV SL. Bruising on RUE, not new. Assess for changes, continue plan of care.     Goal Outcome Evaluation:      Plan of Care Reviewed With: patient    Overall Patient Progress: improvingOverall Patient Progress: improving

## 2023-01-28 ENCOUNTER — APPOINTMENT (OUTPATIENT)
Dept: PHYSICAL THERAPY | Facility: CLINIC | Age: 63
DRG: 291 | End: 2023-01-28
Attending: CASE MANAGER/CARE COORDINATOR
Payer: COMMERCIAL

## 2023-01-28 LAB
ANION GAP SERPL CALCULATED.3IONS-SCNC: 11 MMOL/L (ref 7–15)
ANION GAP SERPL CALCULATED.3IONS-SCNC: 14 MMOL/L (ref 7–15)
BASOPHILS # BLD AUTO: 0.1 10E3/UL (ref 0–0.2)
BASOPHILS NFR BLD AUTO: 1 %
BUN SERPL-MCNC: 22.8 MG/DL (ref 8–23)
BUN SERPL-MCNC: 25.5 MG/DL (ref 8–23)
CALCIUM SERPL-MCNC: 8.8 MG/DL (ref 8.8–10.2)
CALCIUM SERPL-MCNC: 9.9 MG/DL (ref 8.8–10.2)
CHLORIDE SERPL-SCNC: 90 MMOL/L (ref 98–107)
CHLORIDE SERPL-SCNC: 97 MMOL/L (ref 98–107)
CREAT SERPL-MCNC: 1.2 MG/DL (ref 0.51–1.17)
CREAT SERPL-MCNC: 1.27 MG/DL (ref 0.51–1.17)
DEPRECATED HCO3 PLAS-SCNC: 26 MMOL/L (ref 22–29)
DEPRECATED HCO3 PLAS-SCNC: 27 MMOL/L (ref 22–29)
EOSINOPHIL # BLD AUTO: 0.1 10E3/UL (ref 0–0.7)
EOSINOPHIL NFR BLD AUTO: 3 %
ERYTHROCYTE [DISTWIDTH] IN BLOOD BY AUTOMATED COUNT: 17.3 % (ref 10–15)
GFR SERPL CREATININE-BSD FRML MDRD: 48 ML/MIN/1.73M2
GFR SERPL CREATININE-BSD FRML MDRD: 51 ML/MIN/1.73M2
GLUCOSE SERPL-MCNC: 113 MG/DL (ref 70–99)
GLUCOSE SERPL-MCNC: 87 MG/DL (ref 70–99)
HCT VFR BLD AUTO: 43.9 % (ref 35–53)
HGB BLD-MCNC: 14.5 G/DL (ref 11.7–17.7)
IMM GRANULOCYTES # BLD: 0 10E3/UL
IMM GRANULOCYTES NFR BLD: 0 %
LYMPHOCYTES # BLD AUTO: 0.5 10E3/UL (ref 0.8–5.3)
LYMPHOCYTES NFR BLD AUTO: 11 %
MAGNESIUM SERPL-MCNC: 2.4 MG/DL (ref 1.7–2.3)
MCH RBC QN AUTO: 36.6 PG (ref 26.5–33)
MCHC RBC AUTO-ENTMCNC: 33 G/DL (ref 31.5–36.5)
MCV RBC AUTO: 111 FL (ref 78–100)
MDC_IDC_EPISODE_DTM: NORMAL
MDC_IDC_EPISODE_DURATION: 1 S
MDC_IDC_EPISODE_ID: NORMAL
MDC_IDC_EPISODE_TYPE: NORMAL
MDC_IDC_LEAD_IMPLANT_DT: NORMAL
MDC_IDC_LEAD_IMPLANT_DT: NORMAL
MDC_IDC_LEAD_LOCATION: NORMAL
MDC_IDC_LEAD_LOCATION: NORMAL
MDC_IDC_LEAD_LOCATION_DETAIL_1: NORMAL
MDC_IDC_LEAD_LOCATION_DETAIL_1: NORMAL
MDC_IDC_LEAD_MFG: NORMAL
MDC_IDC_LEAD_MFG: NORMAL
MDC_IDC_LEAD_MODEL: NORMAL
MDC_IDC_LEAD_MODEL: NORMAL
MDC_IDC_LEAD_POLARITY_TYPE: NORMAL
MDC_IDC_LEAD_POLARITY_TYPE: NORMAL
MDC_IDC_LEAD_SERIAL: NORMAL
MDC_IDC_LEAD_SERIAL: NORMAL
MDC_IDC_LEAD_SPECIAL_FUNCTION: NORMAL
MDC_IDC_LEAD_SPECIAL_FUNCTION: NORMAL
MDC_IDC_MSMT_BATTERY_DTM: NORMAL
MDC_IDC_MSMT_BATTERY_REMAINING_LONGEVITY: 132 MO
MDC_IDC_MSMT_BATTERY_RRT_TRIGGER: 2.62
MDC_IDC_MSMT_BATTERY_STATUS: NORMAL
MDC_IDC_MSMT_BATTERY_VOLTAGE: 3.03 V
MDC_IDC_MSMT_LEADCHNL_RA_IMPEDANCE_VALUE: 342 OHM
MDC_IDC_MSMT_LEADCHNL_RA_IMPEDANCE_VALUE: 437 OHM
MDC_IDC_MSMT_LEADCHNL_RV_IMPEDANCE_VALUE: 342 OHM
MDC_IDC_MSMT_LEADCHNL_RV_IMPEDANCE_VALUE: 437 OHM
MDC_IDC_MSMT_LEADCHNL_RV_PACING_THRESHOLD_AMPLITUDE: 0.75 V
MDC_IDC_MSMT_LEADCHNL_RV_PACING_THRESHOLD_PULSEWIDTH: 0.4 MS
MDC_IDC_MSMT_LEADCHNL_RV_SENSING_INTR_AMPL: 7.3 MV
MDC_IDC_PG_IMPLANT_DTM: NORMAL
MDC_IDC_PG_MFG: NORMAL
MDC_IDC_PG_MODEL: NORMAL
MDC_IDC_PG_SERIAL: NORMAL
MDC_IDC_PG_TYPE: NORMAL
MDC_IDC_SESS_CLINIC_NAME: NORMAL
MDC_IDC_SESS_DTM: NORMAL
MDC_IDC_SESS_TYPE: NORMAL
MDC_IDC_SET_BRADY_HYSTRATE: NORMAL
MDC_IDC_SET_BRADY_LOWRATE: 75 {BEATS}/MIN
MDC_IDC_SET_BRADY_MAX_SENSOR_RATE: 130 {BEATS}/MIN
MDC_IDC_SET_BRADY_MODE: NORMAL
MDC_IDC_SET_LEADCHNL_RA_SENSING_ANODE_ELECTRODE_1: NORMAL
MDC_IDC_SET_LEADCHNL_RA_SENSING_ANODE_LOCATION_1: NORMAL
MDC_IDC_SET_LEADCHNL_RA_SENSING_CATHODE_ELECTRODE_1: NORMAL
MDC_IDC_SET_LEADCHNL_RA_SENSING_CATHODE_LOCATION_1: NORMAL
MDC_IDC_SET_LEADCHNL_RA_SENSING_POLARITY: NORMAL
MDC_IDC_SET_LEADCHNL_RA_SENSING_SENSITIVITY: NORMAL
MDC_IDC_SET_LEADCHNL_RV_PACING_AMPLITUDE: 2 V
MDC_IDC_SET_LEADCHNL_RV_PACING_ANODE_ELECTRODE_1: NORMAL
MDC_IDC_SET_LEADCHNL_RV_PACING_ANODE_LOCATION_1: NORMAL
MDC_IDC_SET_LEADCHNL_RV_PACING_CAPTURE_MODE: NORMAL
MDC_IDC_SET_LEADCHNL_RV_PACING_CATHODE_ELECTRODE_1: NORMAL
MDC_IDC_SET_LEADCHNL_RV_PACING_CATHODE_LOCATION_1: NORMAL
MDC_IDC_SET_LEADCHNL_RV_PACING_POLARITY: NORMAL
MDC_IDC_SET_LEADCHNL_RV_PACING_PULSEWIDTH: 0.4 MS
MDC_IDC_SET_LEADCHNL_RV_SENSING_ANODE_ELECTRODE_1: NORMAL
MDC_IDC_SET_LEADCHNL_RV_SENSING_ANODE_LOCATION_1: NORMAL
MDC_IDC_SET_LEADCHNL_RV_SENSING_CATHODE_ELECTRODE_1: NORMAL
MDC_IDC_SET_LEADCHNL_RV_SENSING_CATHODE_LOCATION_1: NORMAL
MDC_IDC_SET_LEADCHNL_RV_SENSING_POLARITY: NORMAL
MDC_IDC_SET_LEADCHNL_RV_SENSING_SENSITIVITY: 0.9 MV
MDC_IDC_SET_ZONE_DETECTION_INTERVAL: 400 MS
MDC_IDC_SET_ZONE_TYPE: NORMAL
MDC_IDC_STAT_BRADY_AP_VP_PERCENT: 0 %
MDC_IDC_STAT_BRADY_AP_VS_PERCENT: 0 %
MDC_IDC_STAT_BRADY_AS_VP_PERCENT: 0.26 %
MDC_IDC_STAT_BRADY_AS_VS_PERCENT: 99.74 %
MDC_IDC_STAT_BRADY_DTM_END: NORMAL
MDC_IDC_STAT_BRADY_DTM_START: NORMAL
MDC_IDC_STAT_BRADY_RA_PERCENT_PACED: 0 %
MDC_IDC_STAT_BRADY_RV_PERCENT_PACED: 0.26 %
MDC_IDC_STAT_EPISODE_RECENT_COUNT: 0
MDC_IDC_STAT_EPISODE_RECENT_COUNT: 0
MDC_IDC_STAT_EPISODE_RECENT_COUNT: 1
MDC_IDC_STAT_EPISODE_RECENT_COUNT_DTM_END: NORMAL
MDC_IDC_STAT_EPISODE_RECENT_COUNT_DTM_START: NORMAL
MDC_IDC_STAT_EPISODE_TOTAL_COUNT: 209
MDC_IDC_STAT_EPISODE_TOTAL_COUNT: 3691
MDC_IDC_STAT_EPISODE_TOTAL_COUNT: NORMAL
MDC_IDC_STAT_EPISODE_TOTAL_COUNT_DTM_END: NORMAL
MDC_IDC_STAT_EPISODE_TOTAL_COUNT_DTM_START: NORMAL
MDC_IDC_STAT_EPISODE_TYPE: NORMAL
MONOCYTES # BLD AUTO: 0.7 10E3/UL (ref 0–1.3)
MONOCYTES NFR BLD AUTO: 13 %
NEUTROPHILS # BLD AUTO: 3.5 10E3/UL (ref 1.6–8.3)
NEUTROPHILS NFR BLD AUTO: 72 %
NRBC # BLD AUTO: 0 10E3/UL
NRBC BLD AUTO-RTO: 0 /100
PLATELET # BLD AUTO: 78 10E3/UL (ref 150–450)
POTASSIUM SERPL-SCNC: 3.6 MMOL/L (ref 3.4–5.3)
POTASSIUM SERPL-SCNC: 4 MMOL/L (ref 3.4–5.3)
RBC # BLD AUTO: 3.96 10E6/UL (ref 3.8–5.9)
SODIUM SERPL-SCNC: 131 MMOL/L (ref 136–145)
SODIUM SERPL-SCNC: 134 MMOL/L (ref 136–145)
WBC # BLD AUTO: 4.9 10E3/UL (ref 4–11)

## 2023-01-28 PROCEDURE — 83735 ASSAY OF MAGNESIUM: CPT | Performed by: NURSE PRACTITIONER

## 2023-01-28 PROCEDURE — 250N000012 HC RX MED GY IP 250 OP 636 PS 637

## 2023-01-28 PROCEDURE — 36415 COLL VENOUS BLD VENIPUNCTURE: CPT | Performed by: NURSE PRACTITIONER

## 2023-01-28 PROCEDURE — 250N000013 HC RX MED GY IP 250 OP 250 PS 637: Performed by: CASE MANAGER/CARE COORDINATOR

## 2023-01-28 PROCEDURE — 97161 PT EVAL LOW COMPLEX 20 MIN: CPT | Mod: GP | Performed by: PHYSICAL THERAPIST

## 2023-01-28 PROCEDURE — 214N000001 HC R&B CCU UMMC

## 2023-01-28 PROCEDURE — 80048 BASIC METABOLIC PNL TOTAL CA: CPT

## 2023-01-28 PROCEDURE — 97530 THERAPEUTIC ACTIVITIES: CPT | Mod: GP | Performed by: PHYSICAL THERAPIST

## 2023-01-28 PROCEDURE — 80048 BASIC METABOLIC PNL TOTAL CA: CPT | Performed by: NURSE PRACTITIONER

## 2023-01-28 PROCEDURE — 250N000013 HC RX MED GY IP 250 OP 250 PS 637

## 2023-01-28 PROCEDURE — 97116 GAIT TRAINING THERAPY: CPT | Mod: GP | Performed by: PHYSICAL THERAPIST

## 2023-01-28 PROCEDURE — 250N000011 HC RX IP 250 OP 636: Performed by: NURSE PRACTITIONER

## 2023-01-28 PROCEDURE — 85025 COMPLETE CBC W/AUTO DIFF WBC: CPT

## 2023-01-28 PROCEDURE — 36415 COLL VENOUS BLD VENIPUNCTURE: CPT

## 2023-01-28 PROCEDURE — 250N000013 HC RX MED GY IP 250 OP 250 PS 637: Performed by: NURSE PRACTITIONER

## 2023-01-28 PROCEDURE — 99232 SBSQ HOSP IP/OBS MODERATE 35: CPT | Mod: 24 | Performed by: NURSE PRACTITIONER

## 2023-01-28 PROCEDURE — 97110 THERAPEUTIC EXERCISES: CPT | Mod: GP | Performed by: PHYSICAL THERAPIST

## 2023-01-28 PROCEDURE — 250N000013 HC RX MED GY IP 250 OP 250 PS 637: Performed by: INTERNAL MEDICINE

## 2023-01-28 RX ORDER — POTASSIUM CHLORIDE 750 MG/1
20 TABLET, EXTENDED RELEASE ORAL ONCE
Status: COMPLETED | OUTPATIENT
Start: 2023-01-28 | End: 2023-01-28

## 2023-01-28 RX ORDER — METOLAZONE 2.5 MG/1
2.5 TABLET ORAL ONCE
Status: COMPLETED | OUTPATIENT
Start: 2023-01-28 | End: 2023-01-28

## 2023-01-28 RX ADMIN — Medication 1 TABLET: at 10:48

## 2023-01-28 RX ADMIN — FUROSEMIDE 80 MG: 10 INJECTION, SOLUTION INTRAVENOUS at 18:31

## 2023-01-28 RX ADMIN — GABAPENTIN 200 MG: 100 CAPSULE ORAL at 22:22

## 2023-01-28 RX ADMIN — LORATADINE 10 MG: 10 TABLET ORAL at 10:48

## 2023-01-28 RX ADMIN — APIXABAN 5 MG: 5 TABLET, FILM COATED ORAL at 20:39

## 2023-01-28 RX ADMIN — Medication 3 MG: at 10:49

## 2023-01-28 RX ADMIN — GABAPENTIN 100 MG: 100 CAPSULE ORAL at 10:48

## 2023-01-28 RX ADMIN — HYDROXYCHLOROQUINE SULFATE 200 MG: 200 TABLET, FILM COATED ORAL at 20:39

## 2023-01-28 RX ADMIN — DILTIAZEM HYDROCHLORIDE 120 MG: 120 CAPSULE, COATED, EXTENDED RELEASE ORAL at 10:49

## 2023-01-28 RX ADMIN — APIXABAN 5 MG: 5 TABLET, FILM COATED ORAL at 10:48

## 2023-01-28 RX ADMIN — METOLAZONE 2.5 MG: 2.5 TABLET ORAL at 10:48

## 2023-01-28 RX ADMIN — FUROSEMIDE 80 MG: 10 INJECTION, SOLUTION INTRAVENOUS at 10:48

## 2023-01-28 RX ADMIN — EMPAGLIFLOZIN 10 MG: 10 TABLET, FILM COATED ORAL at 10:48

## 2023-01-28 RX ADMIN — POTASSIUM CHLORIDE 20 MEQ: 750 TABLET, EXTENDED RELEASE ORAL at 10:49

## 2023-01-28 RX ADMIN — SPIRONOLACTONE 25 MG: 25 TABLET, FILM COATED ORAL at 10:48

## 2023-01-28 RX ADMIN — ACETAMINOPHEN 650 MG: 325 TABLET, FILM COATED ORAL at 15:58

## 2023-01-28 RX ADMIN — GABAPENTIN 100 MG: 100 CAPSULE ORAL at 15:58

## 2023-01-28 ASSESSMENT — ACTIVITIES OF DAILY LIVING (ADL)
ADLS_ACUITY_SCORE: 25

## 2023-01-28 NOTE — PLAN OF CARE
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  VS:VSS  IV:PIV-SL  Tubes:NA  Neuro:A/Ox4  Resp:States breathing is at baseline prior to admission, slightly short of breath with activity, lungs clear, on RA  GI/:Voiding well after IV lasix and PO zaroxolyn. Stated had normal BM this AM.  Nutrition:Good PO intake, keeping track of fluid intake and telling RN how much urine output she has.   Endo:NA  Skin:Multiple bruises on both arms, unable to use left arm due to prior vascular surgery  Activity:Up independently to commode, bathroom, and in halls with walker.  Pain: Slight shoulder discomfort but wants to wait for medication until later.   Social: No visitors this shift, has cell phone at bedside.   Plan: Continue diuresis, plan for discharge on Monday. Continue current cares and notify providers with questions or concerns.

## 2023-01-28 NOTE — PROGRESS NOTES
01/28/23 0953   Appointment Info   Signing Clinician's Name / Credentials (PT) Archana Lucero, PT, DPT   Living Environment   People in Home spouse   Current Living Arrangements house   Home Accessibility stairs to enter home;stairs within home   Number of Stairs, Main Entrance 3   Stair Railings, Main Entrance railings safe and in good condition   Number of Stairs, Within Home, Primary   (half level to basement, with railing)   Transportation Anticipated car, drives self;family or friend will provide   Living Environment Comments Pt reports her SO is available to assist with shopping/lifting activities at home. Pt has a walk in shower with built in bench.   Self-Care   Usual Activity Tolerance moderate   Current Activity Tolerance moderate   Regular Exercise Yes   Activity/Exercise Type   (stepper machine for aerobic exercise, negotiates up/down 1/2 flight of stairs for execise at home.)   Exercise Amount/Frequency 3-5 times/wk   Equipment Currently Used at Home shower chair  (4WW)   Fall history within last six months no   Activity/Exercise/Self-Care Comment Pt reports she uses her aerobic exercise maching at home when her  can assist. Reports history of L knee pain which increasing difficulty with stair negotiation, especially descending stairs. Pt recently started OP PT to address R shoulder pain. Pt reports intermittent difficulty with reaching  overhead/washing hair 2/2 R shoudler pain. Pt has a history of R wrist fusion which limits ability to complete pericares with R hand, able to complete with L hand.   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), SSS s/p ppm (2019), VSD (s/p repair 1969), RA and DLBCL (s/p CHOP therapy and in remission), who was admitted on 1/24/2023 for heart failure exacerbation   Existing Precautions/Restrictions no known precautions/restrictions   Heart Disease Risk Factors Medical history   General Observations Activity: Ambulate   Cognition   Affect/Mental Status (Cognition) WNL   Orientation Status (Cognition) oriented x 4   Follows Commands (Cognition) WNL   Pain Assessment   Patient Currently in Pain No   Integumentary/Edema   Integumentary/Edema Comments Pt has edema in BLEs, wearing home compression garments. Denies change in LE edema compared to baseline.   Posture    Posture Forward head position;Protracted shoulders   Range of Motion (ROM)   ROM Comment BLE ROM WFL. BUE WFL   Strength (Manual Muscle Testing)   Strength Comments BLE strength >3/5. LUE WFL except B shoulder flexion ~90 degrees, painful in R shoulder and R wrist 2/2 prior fusion.   Bed Mobility   Comment, (Bed Mobility) IND   Transfers   Comment, (Transfers) IND   Gait/Stairs (Locomotion)   Comment, (Gait/Stairs) Mod I with 4WW, reports concern with stair negotiation 2/2 L knee pain and weakness   Balance   Balance Comments Pt ambulates with and without 4WW, SBA. States she needs 4WW/UE support for longer distances   Clinical Impression   Criteria for Skilled Therapeutic Intervention Yes, treatment indicated   PT Diagnosis (PT) impiared functional mobility   Influenced by the following impairments L knee pain and general LE weakness   Functional limitations due to impairments difficulty with stairs and community mobility   Clinical Presentation (PT Evaluation Complexity) Stable/Uncomplicated   Clinical Presentation  Rationale medical status, level of impairments   Clinical Decision Making (Complexity) low complexity   Planned Therapy Interventions (PT) gait training;home exercise program;strengthening;progressive activity/exercise;home program guidelines   Risk & Benefits of therapy have been explained evaluation/treatment results reviewed;care plan/treatment goals reviewed;risks/benefits reviewed;participants voiced agreement with care plan;current/potential barriers reviewed;participants included;patient   PT Total Evaluation Time   PT Eval, Low Complexity Minutes (73178) 6   Physical Therapy Goals   PT Frequency 3x/week   PT Goals Stairs;PT Goal 1   PT: Stairs 5 stairs;Modified independent   PT: Goal 1 Independent with LE strength HEP to improve safety and independence with stair negotiation   PT Discharge Planning   PT Plan PT PLAN: review stairs and strength HEP, disch   PT Discharge Recommendation (DC Rec) home with outpatient physical therapy;home with assist   PT Rationale for DC Rec Pt is transferring independently and ambulating with 4WW mod I. Pt reports L knee and R shoulder pain limiting ability to complete some ADLs and difficulty with stairs. Pt is safe to discharge to home with assist, recommend resumtion of OP PT to continue to address LUE shoudler and R knee pain   PT Brief overview of current status mod I with 4WW   Total Session Time   Total Session Time (sum of timed and untimed services) 6

## 2023-01-28 NOTE — PROGRESS NOTES
Shift 1500 PM - 1900 PM    Patient  AO X 4, calls appropriately, VSS, on room air. Breathing even and unlabored. . No discomfort noted this shift.

## 2023-01-28 NOTE — PROGRESS NOTES
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 Recently hospitalized with HF hospitalization in s/o ectopy, right heart cath 12/3/22 CI 1.1, CO 1.6. Discharge weight 109 lbs, on Lasix 40mg BID, taking as prescribed but had reported a 10lb weight gain in 1 week. Pt with known severe TR, most recent echo 12/5.    - Volume status: hypervolemic, continue Lasix 80 mg IV BID, adding Metolazone 2.5 mg PO X1 today  - AA: Continue PTA Aldactone 25 mg daily   - SGLT2i: PTA empagliflozin 10 mg qday  - Strict I&O's with daily weight  - Low Na diet & 2 L fluid restriction  - Monitor K & Mg with goal K > 4 & Mg > 2  - CVTS consulted during prior hospitalization, patient currently would like to wait on any surgical repair of tricuspid valve but discussed may need to consider if continued issues with volume    # AF with RVR  # Long-standing persistent Atrial flutter/fibrillation   # NSVT s/p PVC ablation (11/30/2022)  # SSS s/p ppm (2019)   Presented with AF with RVR in the setting of ADHF, hemodynamically stable.   - Anticoagulation: [id number] 2, apixaban 5 mg BID  - Rate control: Discontinued  PTA Atenolol given hx HFpEF, started Diltiazem ER 120mg daily. Allow for liberalized HR control given restrictive physiology. HRs adequate 90-110s currently.   - EP consulted; PPM threshold change to  (rather than )    # CELSA  Baseline Cr around 1.   - Cr 1.14 (1.28)  - Continue diuresis as listed above    # Chronic hyponatremia  Baseline appears to be around 127-132, was as low at 119 11/22. Na on admission 132. Likely component of hypervolemic hyponatremia.  - Na 132 (132)  -2L fluid restricted diet  - Diuresis as listed above    # RA  - Continue PTA Plaquenil 200 mg daily and Prednisone 3 mg daily  - Continue PTA Gabapentin 100 mg BID and 200 mg at bedtime  - Holding PTA Alendronate while IP    # History of Diffuse Large B-cell Lymphoma  # History of cardiac arrest  Prior hospitalization 6/2017 due to VF arrest with normal angiogram and found to have DLBCL complicated by masses on the coronary cusps and LVOT. S/p RCHOP and ECHOP therapy (total 5 rounds). No history of radiation therapy. Deemed in complete remission in 2019 and graduated out of surveillance at that time. SPEP and UPEP checked in 11/2022 with no evidence of amyloidosis.    FEN: 2 gm Sodium, 2 L fluid restriction  Code status: Full  Prophylaxis:  PO Eliquis  Isolation: Contact, ESBL  Disposition: discharge home 3-5 days pending volume status  ==========================================================================    Interval History:  -No acute events overnight  -Pt reports breathing feels better  -Continues to have endorse B/L LE edema  - Weight unchanged, net negative 1.48L in last 24 hours.     Physical Exam:  Temp:  [97.2  F (36.2  C)-98.3  F (36.8  C)] 98.3  F (36.8  C)  Pulse:  [102-117] 117  Resp:  [16-20] 18  BP: ()/(74-85) 103/75  SpO2:  [94 %-98 %] 94 %    I/O:    Wt:   Wt Readings from Last 5 Encounters:   01/27/23 52.1 kg (114 lb 14.4 oz)   01/09/23 53.2 kg (117 lb 3.2 oz)   12/21/22 49.8 kg (109 lb 12.8 oz)    12/19/22 49.4 kg (109 lb)   12/08/22 49.8 kg (109 lb 12.8 oz)         General: NAD  HEENT:  PERRLA, EOMI.   Neck: JVD elevated, likely 2/2 TR.   CV: Irregularly irregular rhythm, loud murmur appreciated at L & R upper sternal borders. No rubs or gallops. Peripheral radial pulse intact.  Resp: No increased work of breathing or use of accessory muscles, breathing comfortably on room air.  Lung sounds clear throughout/bilaterally  Abdomen:  Normal active bowel sounds.  Abdomen is soft. No distension, non-tender to palpation.    Extremities: Warm. Capillary refill less than 3 sec. 2/4 radial pulses bilaterally.  2/4 pedal pulses bilaterally. +2 pitting B/L LE edema. Stewart lower extremities. No cyanosis or clubbing.  Skin:  Warm and dry. No erythema, rashes, ulceration or diaphoresis.  Neuro: Alert and oriented x3.      Medications:    apixaban ANTICOAGULANT  5 mg Oral BID     diltiazem ER COATED BEADS  120 mg Oral Daily     empagliflozin  10 mg Oral Daily     furosemide  80 mg Intravenous BID     gabapentin  100 mg Oral BID     gabapentin  200 mg Oral At Bedtime     hydroxychloroquine  200 mg Oral Daily     loratadine  10 mg Oral Daily     multivitamin w/minerals  1 tablet Oral Daily     predniSONE  3 mg Oral Daily     sodium chloride (PF)  3 mL Intracatheter Q8H     spironolactone  25 mg Oral Daily       - MEDICATION INSTRUCTIONS -       - MEDICATION INSTRUCTIONS -         Labs:   CMP  Recent Labs   Lab 01/27/23  0801 01/26/23  0606 01/25/23  1613 01/25/23  0454 01/24/23  1631   * 132*  --  132* 126*   POTASSIUM 3.5 3.9 4.1 3.1* 4.0   CHLORIDE 98 95*  --  93* 89*   CO2 25 26  --  23 21*   ANIONGAP 10 11  --  16* 16*   GLC 85 79  --  106* 113*   BUN 26.8* 31.4*  --  32.1* 38.2*   CR 1.14 1.28*  --  1.12 1.14   GFRESTIMATED 54* 47*  --  55* 54*   VIKTORIYA 8.5* 8.9  --  9.3 9.2   MAG 2.3 2.3  --  2.1  --      CBC  Recent Labs   Lab 01/27/23  0801 01/26/23  0606 01/25/23  0454 01/24/23  1631   WBC 4.5 5.2 5.1 5.5   RBC  3.87 3.99 3.92 4.02   HGB 14.0 14.5 14.3 14.7   HCT 42.3 44.1 42.4 43.0   * 111* 108* 107*   MCH 36.2* 36.3* 36.5* 36.6*   MCHC 33.1 32.9 33.7 34.2   RDW 17.5* 17.4* 17.0* 17.0*   PLT 84* 92* 91* 91*       Diagnostics:  EC23 Atrial fibrillation        Echo: 22  Interpretation Summary  Severe tricuspid insufficiency is present.  Moderate right ventricular dilation is present. Global right ventricular  function is mildly reduced.  Global and regional left ventricular function is normal with an EF of 55-60%.  IVC diameter >2.1 cm collapsing <50% with sniff suggests a high RA pressure  estimated at 15 mmHg or greater.  No pericardial effusion is present.  No significant changes noted.    KEVIN Total time spent on patient visit, reviewing notes, imaging, labs, orders, and completing necessary documentation: 30 minutes.  >50% of visit spent on counseling patient and/or coordination of care.      Patient seen and discussed with Dr. Walker, who agrees with above plan.    Maria Esther Cutler, APRN, CNP  [facility] Cardiology Team  Pager [phone]/ASCOM [phone]    Attending Attestation: I have personally seen and evaluated this patient as part of a shared APRN/PA visit with Maria Esther Cutler. I personally obtained and reviewed the history, exam, and made the pertinent medical decisions which are accurately recorded. My key management decisions carried out under my direction include modifications of the diuretic regimen.  My additional findings, if any, have been incorporated into the body of the note. All labs, imaging studies, ECG and telemetry data have been reviewed. The assessment and plan outlined reflect our joint decision and include my key treatment recommendations and/or coordination of care that I summarized and shared with the patient.

## 2023-01-28 NOTE — PLAN OF CARE
Admitted on 1/24 for heart failure exacerbation. 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 No SOB with mild MONTALVO  GI/: Voids spontaneously. Adequate UOP. Bowel sounds audible and normoactive. LBM 1//27.   Diet/Appetite: 2g Na diet with 2L FR. Good appetite  Skin: Large bruising on R upper arm with scattered and generalized bruising. Stewart and edematous BLE  Electrolytes: Replacement protocols for K and Mg. No replacements this shift  LDAs: PIV saline locked  Activity: Up ad emelia  Pain: Some muscle aches. Tylenol x1 and scheduled Gabapentin     Plan: Continue POC and monitor weights. Notify team of any concerns

## 2023-01-29 LAB
ANION GAP SERPL CALCULATED.3IONS-SCNC: 13 MMOL/L (ref 7–15)
BASOPHILS # BLD AUTO: 0.1 10E3/UL (ref 0–0.2)
BASOPHILS NFR BLD AUTO: 1 %
BUN SERPL-MCNC: 26.6 MG/DL (ref 8–23)
CALCIUM SERPL-MCNC: 9.4 MG/DL (ref 8.8–10.2)
CHLORIDE SERPL-SCNC: 93 MMOL/L (ref 98–107)
CREAT SERPL-MCNC: 1.16 MG/DL (ref 0.51–1.17)
DEPRECATED HCO3 PLAS-SCNC: 28 MMOL/L (ref 22–29)
EOSINOPHIL # BLD AUTO: 0.1 10E3/UL (ref 0–0.7)
EOSINOPHIL NFR BLD AUTO: 2 %
ERYTHROCYTE [DISTWIDTH] IN BLOOD BY AUTOMATED COUNT: 16.5 % (ref 10–15)
GFR SERPL CREATININE-BSD FRML MDRD: 53 ML/MIN/1.73M2
GLUCOSE SERPL-MCNC: 77 MG/DL (ref 70–99)
HCT VFR BLD AUTO: 42.9 % (ref 35–53)
HGB BLD-MCNC: 14.8 G/DL (ref 11.7–17.7)
IMM GRANULOCYTES # BLD: 0 10E3/UL
IMM GRANULOCYTES NFR BLD: 0 %
LYMPHOCYTES # BLD AUTO: 0.5 10E3/UL (ref 0.8–5.3)
LYMPHOCYTES NFR BLD AUTO: 12 %
MAGNESIUM SERPL-MCNC: 2.1 MG/DL (ref 1.7–2.3)
MCH RBC QN AUTO: 36.8 PG (ref 26.5–33)
MCHC RBC AUTO-ENTMCNC: 34.5 G/DL (ref 31.5–36.5)
MCV RBC AUTO: 107 FL (ref 78–100)
MONOCYTES # BLD AUTO: 0.7 10E3/UL (ref 0–1.3)
MONOCYTES NFR BLD AUTO: 15 %
NEUTROPHILS # BLD AUTO: 3.2 10E3/UL (ref 1.6–8.3)
NEUTROPHILS NFR BLD AUTO: 70 %
NRBC # BLD AUTO: 0 10E3/UL
NRBC BLD AUTO-RTO: 0 /100
PLATELET # BLD AUTO: 80 10E3/UL (ref 150–450)
POTASSIUM SERPL-SCNC: 3.2 MMOL/L (ref 3.4–5.3)
POTASSIUM SERPL-SCNC: 3.3 MMOL/L (ref 3.4–5.3)
POTASSIUM SERPL-SCNC: 4 MMOL/L (ref 3.4–5.3)
RBC # BLD AUTO: 4.02 10E6/UL (ref 3.8–5.9)
SODIUM SERPL-SCNC: 134 MMOL/L (ref 136–145)
WBC # BLD AUTO: 4.6 10E3/UL (ref 4–11)

## 2023-01-29 PROCEDURE — 250N000013 HC RX MED GY IP 250 OP 250 PS 637: Performed by: CASE MANAGER/CARE COORDINATOR

## 2023-01-29 PROCEDURE — 99232 SBSQ HOSP IP/OBS MODERATE 35: CPT | Mod: 24 | Performed by: INTERNAL MEDICINE

## 2023-01-29 PROCEDURE — 250N000013 HC RX MED GY IP 250 OP 250 PS 637: Performed by: INTERNAL MEDICINE

## 2023-01-29 PROCEDURE — 83735 ASSAY OF MAGNESIUM: CPT | Performed by: INTERNAL MEDICINE

## 2023-01-29 PROCEDURE — 250N000013 HC RX MED GY IP 250 OP 250 PS 637

## 2023-01-29 PROCEDURE — 36415 COLL VENOUS BLD VENIPUNCTURE: CPT

## 2023-01-29 PROCEDURE — 82310 ASSAY OF CALCIUM: CPT

## 2023-01-29 PROCEDURE — 214N000001 HC R&B CCU UMMC

## 2023-01-29 PROCEDURE — 250N000012 HC RX MED GY IP 250 OP 636 PS 637

## 2023-01-29 PROCEDURE — 85025 COMPLETE CBC W/AUTO DIFF WBC: CPT

## 2023-01-29 PROCEDURE — 84132 ASSAY OF SERUM POTASSIUM: CPT | Performed by: INTERNAL MEDICINE

## 2023-01-29 PROCEDURE — 36415 COLL VENOUS BLD VENIPUNCTURE: CPT | Performed by: INTERNAL MEDICINE

## 2023-01-29 RX ORDER — DILTIAZEM HYDROCHLORIDE 120 MG/1
120 CAPSULE, COATED, EXTENDED RELEASE ORAL DAILY
Qty: 90 CAPSULE | Refills: 3 | Status: SHIPPED | OUTPATIENT
Start: 2023-01-30 | End: 2023-01-30

## 2023-01-29 RX ORDER — POTASSIUM CHLORIDE 750 MG/1
20 TABLET, EXTENDED RELEASE ORAL ONCE
Status: COMPLETED | OUTPATIENT
Start: 2023-01-29 | End: 2023-01-29

## 2023-01-29 RX ORDER — POTASSIUM CHLORIDE 750 MG/1
20 TABLET, EXTENDED RELEASE ORAL DAILY
Status: DISCONTINUED | OUTPATIENT
Start: 2023-01-30 | End: 2023-01-30 | Stop reason: HOSPADM

## 2023-01-29 RX ORDER — TORSEMIDE 20 MG/1
40 TABLET ORAL
Status: DISCONTINUED | OUTPATIENT
Start: 2023-01-29 | End: 2023-01-30 | Stop reason: HOSPADM

## 2023-01-29 RX ORDER — POTASSIUM CHLORIDE 1500 MG/1
20 TABLET, EXTENDED RELEASE ORAL DAILY
Qty: 90 TABLET | Refills: 3 | Status: SHIPPED | OUTPATIENT
Start: 2023-01-30 | End: 2023-02-27

## 2023-01-29 RX ADMIN — TORSEMIDE 40 MG: 20 TABLET ORAL at 16:05

## 2023-01-29 RX ADMIN — POTASSIUM CHLORIDE 20 MEQ: 750 TABLET, EXTENDED RELEASE ORAL at 09:14

## 2023-01-29 RX ADMIN — GABAPENTIN 100 MG: 100 CAPSULE ORAL at 16:05

## 2023-01-29 RX ADMIN — Medication 1 TABLET: at 09:14

## 2023-01-29 RX ADMIN — APIXABAN 5 MG: 5 TABLET, FILM COATED ORAL at 20:13

## 2023-01-29 RX ADMIN — GABAPENTIN 200 MG: 100 CAPSULE ORAL at 22:21

## 2023-01-29 RX ADMIN — ACETAMINOPHEN 650 MG: 325 TABLET, FILM COATED ORAL at 22:21

## 2023-01-29 RX ADMIN — POTASSIUM CHLORIDE 20 MEQ: 750 TABLET, EXTENDED RELEASE ORAL at 16:04

## 2023-01-29 RX ADMIN — ACETAMINOPHEN 650 MG: 325 TABLET, FILM COATED ORAL at 09:15

## 2023-01-29 RX ADMIN — GABAPENTIN 100 MG: 100 CAPSULE ORAL at 09:14

## 2023-01-29 RX ADMIN — SPIRONOLACTONE 25 MG: 25 TABLET, FILM COATED ORAL at 09:14

## 2023-01-29 RX ADMIN — HYDROXYCHLOROQUINE SULFATE 200 MG: 200 TABLET, FILM COATED ORAL at 20:13

## 2023-01-29 RX ADMIN — TORSEMIDE 40 MG: 20 TABLET ORAL at 09:14

## 2023-01-29 RX ADMIN — Medication 3 MG: at 13:46

## 2023-01-29 RX ADMIN — APIXABAN 5 MG: 5 TABLET, FILM COATED ORAL at 09:14

## 2023-01-29 RX ADMIN — EMPAGLIFLOZIN 10 MG: 10 TABLET, FILM COATED ORAL at 09:14

## 2023-01-29 RX ADMIN — DILTIAZEM HYDROCHLORIDE 120 MG: 120 CAPSULE, COATED, EXTENDED RELEASE ORAL at 09:14

## 2023-01-29 RX ADMIN — LORATADINE 10 MG: 10 TABLET ORAL at 09:14

## 2023-01-29 ASSESSMENT — ACTIVITIES OF DAILY LIVING (ADL)
ADLS_ACUITY_SCORE: 25

## 2023-01-29 NOTE — PROGRESS NOTES
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 Recently hospitalized with HF hospitalization in s/o ectopy, right heart cath 12/3/22 CI 1.1, CO 1.6. Discharge weight 109 lbs, on Lasix 40mg BID, taking as prescribed but had reported a 10lb weight gain in 1 week. Pt with known severe TR, most recent echo 12/5.    - Volume status: Euvolemic to slightly hypervolemic. Start torsemide 40 mg twice daily . Start daily potassium supplement 20 mEq  - AA: Continue PTA Aldactone 25 mg daily   - SGLT2i: PTA empagliflozin 10 mg qday  - Strict I&O's with daily weight  - Low Na diet & 2 L fluid restriction  - Monitor K & Mg with goal K > 4 & Mg > 2  - CVTS consulted during prior hospitalization, patient currently would like to wait on any surgical repair of tricuspid valve but discussed may need to consider if continued issues with volume    # AF with RVR  # Long-standing persistent Atrial flutter/fibrillation   # NSVT s/p PVC ablation (11/30/2022)  # SSS s/p ppm (2019)   Presented with AF with RVR in the setting of ADHF, hemodynamically  stable.   - Anticoagulation: [id number] 2, apixaban 5 mg BID  - Rate control: Discontinued PTA Atenolol given hx HFpEF, started Diltiazem ER 120mg daily. Allow for liberalized HR control given restrictive physiology. HRs adequate 90-110s currently.   - EP consulted; PPM threshold change to  (rather than )    # CELSA, resolved   Baseline Cr around 1.   - Cr 1.14 (1.28)  - Continue diuresis as listed above    # Chronic hyponatremia  Baseline appears to be around 127-132, was as low at 119 11/22. Na on admission 132. Likely component of hypervolemic hyponatremia.  - Na 134 today (132 baseline)  -2L fluid restricted diet  - Diuresis as listed above    # RA  - Continue PTA Plaquenil 200 mg daily and Prednisone 3 mg daily  - Continue PTA Gabapentin 100 mg BID and 200 mg at bedtime  - Holding PTA Alendronate while IP    # History of Diffuse Large B-cell Lymphoma  # History of cardiac arrest  Prior hospitalization 6/2017 due to VF arrest with normal angiogram and found to have DLBCL complicated by masses on the coronary cusps and LVOT. S/p RCHOP and ECHOP therapy (total 5 rounds). No history of radiation therapy. Deemed in complete remission in 2019 and graduated out of surveillance at that time. SPEP and UPEP checked in 11/2022 with no evidence of amyloidosis.    FEN: 2 gm Sodium, 2 L fluid restriction  Code status: Full  Prophylaxis:  PO Eliquis  Isolation: Contact, ESBL  Disposition: discharge home 3-5 days pending volume status  ==========================================================================    Interval History:  -No acute events overnight  -Pt reports breathing feels better  -Patient is interested in discharging home tomorrow if possible     Physical Exam:  Temp:  [97.4  F (36.3  C)-98.9  F (37.2  C)] 97.9  F (36.6  C)  Pulse:  [113-117] 116  Resp:  [16] 16  BP: ()/(63-87) 111/80  SpO2:  [96 %-98 %] 97 %    I/O:    Wt:   Wt Readings from Last 5 Encounters:   01/29/23 48.9 kg (107 lb 11.2 oz)    01/09/23 53.2 kg (117 lb 3.2 oz)   12/21/22 49.8 kg (109 lb 12.8 oz)   12/19/22 49.4 kg (109 lb)   12/08/22 49.8 kg (109 lb 12.8 oz)         General: NAD  HEENT:  PERRLA, EOMI.   Neck: JVD elevated to tragus, likely 2/2 TR.   CV: Irregularly irregular rhythm, loud murmur appreciated at L & R upper sternal borders. No rubs or gallops. Peripheral radial pulse intact.  Resp: No increased work of breathing or use of accessory muscles, breathing comfortably on room air.  Lung sounds clear throughout/bilaterally  Abdomen:  Normal active bowel sounds.  Abdomen is soft. No distension, non-tender to palpation.    Extremities: Warm. Capillary refill less than 3 sec. 2/4 radial pulses bilaterally.  2/4 pedal pulses bilaterally. +1 pitting B/L LE edema. Stewart lower extremities. No cyanosis or clubbing.  Skin:  Warm and dry. No erythema, rashes, ulceration or diaphoresis.  Neuro: Alert and oriented x3.      Medications:    apixaban ANTICOAGULANT  5 mg Oral BID     diltiazem ER COATED BEADS  120 mg Oral Daily     empagliflozin  10 mg Oral Daily     gabapentin  100 mg Oral BID     gabapentin  200 mg Oral At Bedtime     hydroxychloroquine  200 mg Oral Daily     loratadine  10 mg Oral Daily     multivitamin w/minerals  1 tablet Oral Daily     predniSONE  3 mg Oral Daily     sodium chloride (PF)  3 mL Intracatheter Q8H     spironolactone  25 mg Oral Daily     torsemide  40 mg Oral BID       - MEDICATION INSTRUCTIONS -       - MEDICATION INSTRUCTIONS -         Labs:   CMP  Recent Labs   Lab 01/29/23  0735 01/28/23  1715 01/28/23  0646 01/27/23  0801 01/26/23  0606   * 131* 134* 133* 132*   POTASSIUM 3.3* 4.0 3.6 3.5 3.9   CHLORIDE 93* 90* 97* 98 95*   CO2 28 27 26 25 26   ANIONGAP 13 14 11 10 11   GLC 77 113* 87 85 79   BUN 26.6* 22.8 25.5* 26.8* 31.4*   CR 1.16 1.20* 1.27* 1.14 1.28*   GFRESTIMATED 53* 51* 48* 54* 47*   VIKTORIYA 9.4 9.9 8.8 8.5* 8.9   MAG 2.1  --  2.4* 2.3 2.3     CBC  Recent Labs   Lab 01/29/23  0715  23  0646 23  0801 23  0606   WBC 4.6 4.9 4.5 5.2   RBC 4.02 3.96 3.87 3.99   HGB 14.8 14.5 14.0 14.5   HCT 42.9 43.9 42.3 44.1   * 111* 109* 111*   MCH 36.8* 36.6* 36.2* 36.3*   MCHC 34.5 33.0 33.1 32.9   RDW 16.5* 17.3* 17.5* 17.4*   PLT 80* 78* 84* 92*       Diagnostics:  EC23 Atrial fibrillation        Echo: 22  Interpretation Summary  Severe tricuspid insufficiency is present.  Moderate right ventricular dilation is present. Global right ventricular  function is mildly reduced.  Global and regional left ventricular function is normal with an EF of 55-60%.  IVC diameter >2.1 cm collapsing <50% with sniff suggests a high RA pressure  estimated at 15 mmHg or greater.  No pericardial effusion is present.  No significant changes noted.    KEVIN Total time spent on patient visit, reviewing notes, imaging, labs, orders, and completing necessary documentation: 30 minutes.  >50% of visit spent on counseling patient and/or coordination of care.      Patient seen and discussed with Dr. Walker, who agrees with above plan.    Christian Farmer MD  Cardiology Fellow PGY 6    Attending Attestation: I saw, examined and evaluated the patient and reviewed all relevant data. I agree with the findings, and our shared assessment and plan of care documented in the edited note and summarized the orona findings and plan with the patient.  - discharge Monday on torsemide BID  - happy to f/u as outpt

## 2023-01-29 NOTE — PLAN OF CARE
Goal Outcome Evaluation:  [id number]:  HX:62 year old year old adult with PMHx of HFpEF, mildly dilated and reduced RV function, severe TR (2/2 failure of leaflet coaptation), atrial flutter/fibrillation (on apixaban and rate control strategy), NSVT s/p PVC ablation (11/30/2022), cardiac arrest (2017 likely 2/2 malignant involvement of the pericardium c/b organizing pericardial effusion), SSS s/p ppm (2019), VSD (s/p repair 1969), RA and DLBCL (s/p CHOP therapy and in remission), who was admitted on 1/24/2023 for heart failure exacerbation     Cardiac:A-flutter, 115s.  VS:VSS  IV:PIV-SL  Tubes:NA  Neuro:A/Ox4  Resp:Denies shortness of breath. States breathing is at baseline prior to admission, slightly short of breath with activity, lungs clear, on RA.  GI/:Diuretic changed to torsemide. Continuing to monitor output. No BM this shift. Nutrition:Good PO intake, keeping track of fluid intake and telling RN how much urine output she has.   Endo:NA  Skin:Multiple bruises on both arms, unable to use left arm for access, BP, or VPT due to prior vascular surgery.  Activity:Up independently to commode, bathroom, and in halls with walker.  Pain: Slight shoulder discomfort; treated with tylenol.  Social: No visitors this shift, has cell phone at bedside.   Plan: Continue diuresis, plan for discharge on Monday. Continue current cares and notify providers with questions or concerns.

## 2023-01-29 NOTE — PLAN OF CARE
Admitted on 1/24 for heart failure exacerbation. 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 No SOB with mild MONTALVO  GI/: Voids spontaneously. Adequate UOP. Bowel sounds audible and normoactive. LBM 1//28.   Diet/Appetite: 2g Na diet with 2L FR. Good appetite  Skin: Large bruising on R upper arm with scattered and generalized bruising. Stewart and edematous BLE  Electrolytes: Replacement protocols for K and Mg. No replacements this shift  LDAs: PIV saline locked  Activity: Up ad emelia  Pain: Some muscle aches scheduled Gabapentin given    Plan: Continue POC and monitor weights. Notify team of any concerns

## 2023-01-29 NOTE — PROGRESS NOTES
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 VSS with rate-controlled aflutter in the 110s - 115s. Independent with a walker ad emelia. Sats >97% on RA, denies SOB/chest pains. 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 L thumb, pointer and middle finger stiff and numbness from past surgical history. LS clear. Strict I/Os with adequate urine out put and 1 BM today. Tolerates diet, eating well. Has some R shoulder pain managed with PRN tylenol today. Pt has stockings on legs, some +2 edema in BL feet. Contact for VRE/ESBL. 1 R PIV - SL. Potassium replaced today at 1600. Lab recheck at aprox. 2000.    /82 (BP Location: Right arm, Cuff Size: Adult Regular)   Pulse 114   Temp 98  F (36.7  C) (Oral)   Resp 15   Ht 1.448 m (4' 9\")   Wt 48.9 kg (107 lb 11.2 oz)   SpO2 98%   BMI 23.31 kg/m       Plan: Continue to monitor Pt status and report changes to Cards 1 treatment team. Plan for discharge home tomorrow.  "

## 2023-01-30 VITALS
WEIGHT: 103.9 LBS | DIASTOLIC BLOOD PRESSURE: 88 MMHG | OXYGEN SATURATION: 96 % | BODY MASS INDEX: 22.42 KG/M2 | TEMPERATURE: 98.3 F | RESPIRATION RATE: 16 BRPM | HEIGHT: 57 IN | HEART RATE: 124 BPM | SYSTOLIC BLOOD PRESSURE: 124 MMHG

## 2023-01-30 LAB
ANION GAP SERPL CALCULATED.3IONS-SCNC: 15 MMOL/L (ref 7–15)
BASOPHILS # BLD AUTO: 0.1 10E3/UL (ref 0–0.2)
BASOPHILS NFR BLD AUTO: 2 %
BUN SERPL-MCNC: 28.5 MG/DL (ref 8–23)
CALCIUM SERPL-MCNC: 9.6 MG/DL (ref 8.8–10.2)
CHLORIDE SERPL-SCNC: 91 MMOL/L (ref 98–107)
CREAT SERPL-MCNC: 1.25 MG/DL (ref 0.51–1.17)
DEPRECATED HCO3 PLAS-SCNC: 30 MMOL/L (ref 22–29)
EOSINOPHIL # BLD AUTO: 0.1 10E3/UL (ref 0–0.7)
EOSINOPHIL NFR BLD AUTO: 1 %
ERYTHROCYTE [DISTWIDTH] IN BLOOD BY AUTOMATED COUNT: 16.4 % (ref 10–15)
GFR SERPL CREATININE-BSD FRML MDRD: 48 ML/MIN/1.73M2
GLUCOSE SERPL-MCNC: 70 MG/DL (ref 70–99)
HCT VFR BLD AUTO: 43.2 % (ref 35–53)
HGB BLD-MCNC: 14.8 G/DL (ref 11.7–17.7)
IMM GRANULOCYTES # BLD: 0 10E3/UL
IMM GRANULOCYTES NFR BLD: 0 %
LYMPHOCYTES # BLD AUTO: 0.5 10E3/UL (ref 0.8–5.3)
LYMPHOCYTES NFR BLD AUTO: 12 %
MAGNESIUM SERPL-MCNC: 2.1 MG/DL (ref 1.7–2.3)
MCH RBC QN AUTO: 36.5 PG (ref 26.5–33)
MCHC RBC AUTO-ENTMCNC: 34.3 G/DL (ref 31.5–36.5)
MCV RBC AUTO: 106 FL (ref 78–100)
MONOCYTES # BLD AUTO: 0.7 10E3/UL (ref 0–1.3)
MONOCYTES NFR BLD AUTO: 14 %
NEUTROPHILS # BLD AUTO: 3.3 10E3/UL (ref 1.6–8.3)
NEUTROPHILS NFR BLD AUTO: 71 %
NRBC # BLD AUTO: 0 10E3/UL
NRBC BLD AUTO-RTO: 0 /100
PLATELET # BLD AUTO: 84 10E3/UL (ref 150–450)
POTASSIUM SERPL-SCNC: 3.1 MMOL/L (ref 3.4–5.3)
POTASSIUM SERPL-SCNC: 3.9 MMOL/L (ref 3.4–5.3)
RBC # BLD AUTO: 4.06 10E6/UL (ref 3.8–5.9)
SODIUM SERPL-SCNC: 136 MMOL/L (ref 136–145)
WBC # BLD AUTO: 4.6 10E3/UL (ref 4–11)

## 2023-01-30 PROCEDURE — 250N000012 HC RX MED GY IP 250 OP 636 PS 637

## 2023-01-30 PROCEDURE — 84132 ASSAY OF SERUM POTASSIUM: CPT | Performed by: INTERNAL MEDICINE

## 2023-01-30 PROCEDURE — 83735 ASSAY OF MAGNESIUM: CPT | Performed by: INTERNAL MEDICINE

## 2023-01-30 PROCEDURE — 250N000013 HC RX MED GY IP 250 OP 250 PS 637

## 2023-01-30 PROCEDURE — 85004 AUTOMATED DIFF WBC COUNT: CPT

## 2023-01-30 PROCEDURE — 80048 BASIC METABOLIC PNL TOTAL CA: CPT

## 2023-01-30 PROCEDURE — 250N000013 HC RX MED GY IP 250 OP 250 PS 637: Performed by: INTERNAL MEDICINE

## 2023-01-30 PROCEDURE — 250N000013 HC RX MED GY IP 250 OP 250 PS 637: Performed by: NURSE PRACTITIONER

## 2023-01-30 PROCEDURE — 250N000013 HC RX MED GY IP 250 OP 250 PS 637: Performed by: CASE MANAGER/CARE COORDINATOR

## 2023-01-30 PROCEDURE — 36415 COLL VENOUS BLD VENIPUNCTURE: CPT | Performed by: INTERNAL MEDICINE

## 2023-01-30 PROCEDURE — 99239 HOSP IP/OBS DSCHRG MGMT >30: CPT | Mod: 24 | Performed by: CASE MANAGER/CARE COORDINATOR

## 2023-01-30 PROCEDURE — 36415 COLL VENOUS BLD VENIPUNCTURE: CPT

## 2023-01-30 RX ORDER — SPIRONOLACTONE 25 MG/1
50 TABLET ORAL DAILY
Status: DISCONTINUED | OUTPATIENT
Start: 2023-01-31 | End: 2023-01-30 | Stop reason: HOSPADM

## 2023-01-30 RX ORDER — SPIRONOLACTONE 25 MG/1
25 TABLET ORAL ONCE
Status: COMPLETED | OUTPATIENT
Start: 2023-01-30 | End: 2023-01-30

## 2023-01-30 RX ORDER — SPIRONOLACTONE 50 MG/1
50 TABLET, FILM COATED ORAL DAILY
Qty: 30 TABLET | Refills: 3 | Status: SHIPPED | OUTPATIENT
Start: 2023-01-30 | End: 2023-02-14

## 2023-01-30 RX ORDER — DILTIAZEM HYDROCHLORIDE 180 MG/1
180 CAPSULE, COATED, EXTENDED RELEASE ORAL DAILY
Status: DISCONTINUED | OUTPATIENT
Start: 2023-01-31 | End: 2023-01-30 | Stop reason: HOSPADM

## 2023-01-30 RX ORDER — DILTIAZEM HYDROCHLORIDE 180 MG/1
180 CAPSULE, COATED, EXTENDED RELEASE ORAL DAILY
Qty: 30 CAPSULE | Refills: 3 | Status: SHIPPED | OUTPATIENT
Start: 2023-01-31 | End: 2023-02-08

## 2023-01-30 RX ORDER — DILTIAZEM HCL 60 MG
60 TABLET ORAL ONCE
Status: COMPLETED | OUTPATIENT
Start: 2023-01-30 | End: 2023-01-30

## 2023-01-30 RX ADMIN — DILTIAZEM HYDROCHLORIDE 60 MG: 60 TABLET ORAL at 11:54

## 2023-01-30 RX ADMIN — SPIRONOLACTONE 25 MG: 25 TABLET, FILM COATED ORAL at 11:54

## 2023-01-30 RX ADMIN — EMPAGLIFLOZIN 10 MG: 10 TABLET, FILM COATED ORAL at 08:09

## 2023-01-30 RX ADMIN — APIXABAN 5 MG: 5 TABLET, FILM COATED ORAL at 08:09

## 2023-01-30 RX ADMIN — GABAPENTIN 100 MG: 100 CAPSULE ORAL at 08:09

## 2023-01-30 RX ADMIN — LORATADINE 10 MG: 10 TABLET ORAL at 08:10

## 2023-01-30 RX ADMIN — Medication 1 TABLET: at 08:09

## 2023-01-30 RX ADMIN — ACETAMINOPHEN 650 MG: 325 TABLET, FILM COATED ORAL at 08:08

## 2023-01-30 RX ADMIN — DILTIAZEM HYDROCHLORIDE 120 MG: 120 CAPSULE, COATED, EXTENDED RELEASE ORAL at 08:10

## 2023-01-30 RX ADMIN — TORSEMIDE 40 MG: 20 TABLET ORAL at 08:09

## 2023-01-30 RX ADMIN — POTASSIUM CHLORIDE 20 MEQ: 750 TABLET, EXTENDED RELEASE ORAL at 08:10

## 2023-01-30 RX ADMIN — Medication 3 MG: at 08:10

## 2023-01-30 RX ADMIN — SPIRONOLACTONE 25 MG: 25 TABLET, FILM COATED ORAL at 08:09

## 2023-01-30 ASSESSMENT — ACTIVITIES OF DAILY LIVING (ADL)
ADLS_ACUITY_SCORE: 25
DEPENDENT_IADLS:: INDEPENDENT
ADLS_ACUITY_SCORE: 25

## 2023-01-30 NOTE — PLAN OF CARE
Admitted on 1/24 for heart failure exacerbation. PMHx of HFpEF, mildly dilated and reduced RV function, severe TR (2/2 failure of leaflet coaptation), atrial flutter/fibrillation (on apixaban and rate control strategy), NSVT s/p PVC ablation (11/30/2022), cardiac arrest (2017 likely 2/2 malignant involvement of the pericardium c/b organizing pericardial effusion), SSS s/p ppm (2019), VSD (s/p repair 1969), RA and DLBCL (s/p CHOP therapy and in remission),     Code status: Full Code   Team: Cards 1     Neuro: AOx4, Calm and cooperative, N/T to bilateral feet (neuropathy). No deficits  Cardiac/Tele: Aflutter, 's-130's, normotensive, No CP or palpitations. Other VSS  Respiratory: Room air, sats > 95%. No SOB with mild MONTALVO  GI/: Voids spontaneously. Adequate UOP. Bowel sounds audible and normoactive. LBM 1//29. No BM this shift  Diet/Appetite: 2g Na diet with 2L FR. Good appetite  Skin: Large bruising on R upper arm with scattered and generalized bruising. Stewart with mild edema to bilateral feet.  Electrolytes: Replacement protocols for K and Mg. No replacements this shift  LDAs: PIV saline locked  Activity: Up ad emelia  Pain: Some muscle aches scheduled Gabapentin and PRN Tylenol x1 given     Plan: Possible discharge today

## 2023-01-30 NOTE — PLAN OF CARE
Physical Therapy Discharge Summary    Reason for therapy discharge:    Discharged to home with outpatient therapy.    Progress towards therapy goal(s). See goals on Care Plan in [facility] electronic health record for goal details.  Goals met    Therapy recommendation(s):    Continued therapy is recommended.  Rationale/Recommendations:  Pt would benefit from outpatient PT to progress functional strength and endurance, as well as, address LUE and R knee pain.

## 2023-01-30 NOTE — PLAN OF CARE
DISCHARGE   Discharged to: Home  Via: Automobile  Accompanied by:   Discharge Instructions: diet, activity, medications, follow up appointments, when to call the MD, and what to watchout for (i.e. s/s of infection, increasing SOB, palpitations, chest pain,)  Prescriptions: To be filled by home pharmacy; medication list reviewed & sent with pt  Follow Up Appointments: arranged; information given  Belongings: All sent with pt  IV: out  Telemetry: off  Pt exhibits understanding of above discharge instructions; all questions answered.  Discharge Paperwork: in chart

## 2023-01-30 NOTE — DISCHARGE SUMMARY
[address] [address]  p: [phone]    Discharge Summary: Cardiology Service    Amelia Michel MRN# [id number]   YOB: 1960 Age: 62 year old       Admission Date: 1/24/2023  Discharge Date: 01/30/23    Discharge Diagnoses:  # Acute on chronic diastolic heart failure (EF 55-60%, 12/5/22)  # Severe TR  # Hypokalemia  # AF with RVR  # Long-standing persistent Atrial flutter/fibrillation   # NSVT s/p PVC ablation (11/30/2022)  # SSS s/p ppm (2019)   #Acute on chronic kidney disease   #Hyponatremia, resolved  # RA  # History of Diffuse Large B-cell Lymphoma  # History of cardiac arrest      Brief HPI:  Amelia Michel is a 62 year old year old adult with PMHx of HFpEF, mildly dilated and reduced RV function, severe TR (2/2 failure of leaflet coaptation), atrial flutter/fibrillation (on apixaban and rate control strategy), NSVT s/p PVC ablation (11/30/2022), cardiac arrest (2017 likely 2/2 malignant involvement of the pericardium c/b organizing pericardial effusion), SSS s/p ppm (2019), VSD (s/p repair 1969), RA and DLBCL (s/p CHOP therapy and in remission), who was admitted on 1/24/2023 for heart failure exacerbation. She was diuresed with 80mg Lasix IV BID, then transitioned to PO torsemide. EP consulted with PPM threshold change to  and diltiazem ER 180mg added for rate control. She was discharged to home on 1/30/23.    Hospital Course by Diagnosis:  # Acute on chronic diastolic heart failure (EF 55-60%, 12/5/22)  # Severe TR  # Hypokalemia  Presented with SOB x 2 weeks and decreased exercise tolerance. Recently hospitalized with HF hospitalization in s/o ectopy, right heart cath 12/3/22 CI 1.1, CO 1.6. Discharge weight 109 lbs, on Lasix 40mg BID, taking as prescribed but had reported a 10lb weight gain in 1 week. Pt with known severe TR, most recent echo 12/5.    - Volume status: Euvolemic to slightly hypervolemic. Start  torsemide 40 mg twice daily  - Weight on day of discharge 103.9 lbs.  -  Start daily potassium supplement 20 mEq - recheck BMP in 1 week  - AA: Increase PTA Aldactone to 50 mg daily for hypoK  - ACE/ARB/ARNI: contraindicated, CKD  - SGLT2i: PTA empagliflozin 10 mg qday  - Low Na diet & 2 L fluid restriction  - CVTS consulted during prior hospitalization, patient currently would like to wait on any surgical repair of tricuspid valve but discussed may need to consider if continued issues with volume    # AF with RVR  # Long-standing persistent Atrial flutter/fibrillation   # NSVT s/p PVC ablation (11/30/2022)  # SSS s/p ppm (2019)   Presented with AF with RVR in the setting of ADHF, hemodynamically stable.     -Anticoagulation: [id number] 2, apixaban 5 mg BID  - Rate control: Discontinued PTA Atenolol given hx HFpEF, started Diltiazem ER 120mg daily, increased to 180mg daily given tachycardia 140s during ambulation.   - EP consulted; PPM threshold change to  (rather than )    #Acute on chronic kidney disease   Baseline Cr around 1.   - Cr 1.25 (1.16)  - Continue diuresis as listed above    #Hyponatremia, resolved  Baseline appears to be around 127-132, was as low at 119 11/22. Na on admission 132. Likely component of hypervolemic hyponatremia.  - Na 136 (134)  -2L fluid restricted diet  - Diuresis as listed above     # RA  - Continue PTA Plaquenil 200 mg daily and Prednisone 3 mg daily  - Continue PTA Gabapentin 100 mg BID and 200 mg at bedtime  - Resume PTA Alendronate     # History of Diffuse Large B-cell Lymphoma  # History of cardiac arrest  Prior hospitalization 6/2017 due to VF arrest with normal angiogram and found to have DLBCL complicated by masses on the coronary cusps and LVOT. S/p RCHOP and ECHOP therapy (total 5 rounds). No history of radiation therapy. Deemed in complete remission in 2019 and graduated out of surveillance at that time. SPEP and UPEP checked in 11/2022 with no evidence of  amyloidosis.    Consults:  Electrophysiology    Medication Changes:  Stop Lasix  Stop Atenolol  Start Torsemide 40 mg BID  Start Diltiazem  mg daily  Start Potassium 20 mEq daily  Increase spironolactone 50mg     Discharge medications:   Current Discharge Medication List      START taking these medications    Details   diltiazem ER COATED BEADS (CARDIZEM CD/CARTIA XT) 180 MG 24 hr capsule Take 1 capsule (180 mg) by mouth daily  Qty: 30 capsule, Refills: 3    Associated Diagnoses: Acute on chronic diastolic heart failure (H)      potassium chloride ER (KLOR-CON M) 20 MEQ CR tablet Take 1 tablet (20 mEq) by mouth daily  Qty: 90 tablet, Refills: 3    Associated Diagnoses: Chronic congestive heart failure, unspecified heart failure type (H)      torsemide 40 MG TABS Take 40 mg by mouth 2 times daily  Qty: 180 tablet, Refills: 3    Associated Diagnoses: Chronic congestive heart failure, unspecified heart failure type (H)         CONTINUE these medications which have CHANGED    Details   spironolactone (ALDACTONE) 50 MG tablet Take 1 tablet (50 mg) by mouth daily  Qty: 30 tablet, Refills: 3    Associated Diagnoses: Acute on chronic diastolic heart failure (H)         CONTINUE these medications which have NOT CHANGED    Details   acetaminophen (TYLENOL) 500 MG tablet Take 500 mg by mouth every 6 hours as needed for mild pain      alendronate (FOSAMAX) 70 MG tablet TAKE 1 TABLET(70 MG) BY MOUTH EVERY 7 DAYS  Qty: 12 tablet, Refills: 3    Comments: ZERO refills remain on this prescription. Your patient is requesting advance approval of refills for this medication to PREVENT ANY MISSED DOSES  Associated Diagnoses: Steroid-induced osteoporosis      apixaban ANTICOAGULANT (ELIQUIS ANTICOAGULANT) 5 MG tablet Take 1 tablet (5 mg) by mouth 2 times daily  Qty: 180 tablet, Refills: 3    Associated Diagnoses: Paroxysmal atrial fibrillation (H)      calcium carbonate 600 mg-vitamin D 400 units (CALTRATE) 600-400 MG-UNIT per  tablet Take 2 tablets by mouth daily      empagliflozin (JARDIANCE) 10 MG TABS tablet Take 1 tablet (10 mg) by mouth daily for 360 days  Qty: 90 tablet, Refills: 3    Associated Diagnoses: Acute on chronic diastolic congestive heart failure (H)      gabapentin (NEURONTIN) 100 MG capsule Take 1 capsule AM + 1 capsule afternoon + 2 capsules at bedtime  Qty: 360 capsule, Refills: 1    Comments: Please dispense as written - last time patient only given #[id number]  Associated Diagnoses: Rheumatoid arthritis of both ankles, unspecified whether rheumatoid factor present (H)      hydroxychloroquine (PLAQUENIL) 200 MG tablet TAKE 1 TABLET(200 MG) BY MOUTH DAILY  Qty: 90 tablet, Refills: 0    Associated Diagnoses: Rheumatoid arthritis involving multiple sites with positive rheumatoid factor (H); Steroid-induced osteoporosis      loratadine (CLARITIN) 10 MG tablet Take 10 mg by mouth daily      multivitamin, therapeutic with minerals (THERA-VIT-M) TABS tablet Take 1 tablet by mouth daily  Qty: 30 each, Refills: 0    Associated Diagnoses: Diffuse large B-cell lymphoma, unspecified body region (H)      predniSONE (DELTASONE) 1 MG tablet Take 3 tablets (3 mg) by mouth daily  Qty: 270 tablet, Refills: 3    Associated Diagnoses: Rheumatoid arthritis involving multiple sites with positive rheumatoid factor (H)      Vitamin D, Cholecalciferol, 10 MCG (400 UNIT) TABS Take 1 tablet by mouth daily      magnesium oxide 200 MG TABS Take 200 mg by mouth daily bedtime      STATIN NOT PRESCRIBED (INTENTIONAL) Please choose reason not prescribed, below  Qty:      Associated Diagnoses: Cardiogenic shock (H)         STOP taking these medications       atenolol (TENORMIN) 50 MG tablet Comments:   Reason for Stopping:         furosemide (LASIX) 40 MG tablet Comments:   Reason for Stopping:               Follow-up:  PCP 1 week +BMP  [facility] on 2[facility]    Labs or imaging requiring follow-up after discharge:  BMP within 1 week    Code  status:  FULL    Condition on discharge  Temp:  [97.9  F (36.6  C)-98.4  F (36.9  C)] 98.3  F (36.8  C)  Pulse:  [101-124] 124  Resp:  [15-18] 16  BP: (101-124)/(76-88) 124/88  SpO2:  [96 %-98 %] 96 %  General: Alert, interactive, NAD  Eyes: sclera anicteric, EOMI  Neck: JVP note elevated  Cardiovascular: irregularly irregular, normal S1 and S2, systolic murmur at L & R upper sternal borders; no gallops or rubs  Resp: clear to auscultation bilaterally, no rales, wheezes, or rhonchi  GI: Soft, nontender, nondistended. +BS.  No HSM or masses, no rebound or guarding.  Extremities: Trace LE edema, no cyanosis or clubbing, dorsalis pedis and posterior tibialis pulses 2+ bilaterally  Skin: Warm and dry, no jaundice or rash  Neuro: CN 2-12 intact, moves all extremities equally  Psych: Alert & oriented x 3    Imaging with results:  Diagnostics:  EC23 Atrial fibrillation        Echo: 22  Interpretation Summary  Severe tricuspid insufficiency is present.  Moderate right ventricular dilation is present. Global right ventricular  function is mildly reduced.  Global and regional left ventricular function is normal with an EF of 55-60%.  IVC diameter >2.1 cm collapsing <50% with sniff suggests a high RA pressure  estimated at 15 mmHg or greater.  No pericardial effusion is present.  No significant changes noted.    Patient Care Team:  Gala Stringer PA-C as PCP - General (Physician Assistant)  Archana Green PA-C as Physician Assistant (Physician Assistant)  Stacie Delgado, RN as Registered Nurse (Clinical Cardiac Electrophysiology)  Kevin Sheldon MD as MD (Plastic Surgery)  Stacie Delgado, ABBEY as Specialty Care Coordinator (Cardiology)  Eugene Hernandez RN (Cardiology)  Maribell Domínguez MD as MD (Rheumatology)  Dima Shrestha MD as MD (Endocrinology, Diabetes, and Metabolism)  Samia Calhoun, RN as Specialty Care Coordinator (Hematology & Oncology)  Thai Heredia MD as MD (Hematology  & Oncology)  Maribell Domínguez MD as Assigned Rheumatology Provider  Gala Stringer PA-C as Assigned PCP  Dima Shrestha MD as Assigned Endocrinology Provider  Thai Heredia MD as Assigned Cancer Care Provider  Cammy Soares, RN as Specialty Care Coordinator (Cardiology)  Juan Eaton MD as Assigned Heart and Vascular Provider  Christian Cameron MD as MD (Dermatology)  Kandy Molina RN as Specialty Care Coordinator (Cardiology)  Virgie John AuD as Audiologist (Audiology)  Elmira Vasquez APRN CNP as Assigned Surgical Provider    ELIZABETH Alonso, CNP  [facility] Cardiology  Patient discussed with staff cardiologist, Dr. Gavi Wei, who agrees with the above documentation and plan. Documentation represents joint decision making.     Time Spent on this Encounter   KANDIS CHAVEZ APRN CNP, personally saw the patient today and spent greater than 30 minutes discharging this patient.

## 2023-01-30 NOTE — DISCHARGE SUMMARY
[address] [address]  p: [phone]    Discharge Summary: Cardiology Service    Amelia Michel MRN# [id number]   YOB: 1960 Age: 62 year old       Admission Date: 1/24/2023  Discharge Date: ***    Discharge Diagnoses:  1. HFpEF  2. Severe TR  3. Mild/moderate reduced RV function  4. Permanent AF/AFL  5. SSS s/p PPM  6. VSD s/p repair 1969  7. Hx DLBCL s/p CHOP in remission    Brief HPI:  Amelia Michel is a 62 year old year old adult with PMHx of HFpEF, mildly dilated and reduced RV function, severe TR (2/2 failure of leaflet coaptation), atrial flutter/fibrillation (on apixaban and rate control strategy), NSVT s/p PVC ablation (11/30/2022), cardiac arrest (2017 likely 2/2 malignant involvement of the pericardium c/b organizing pericardial effusion), SSS s/p ppm (2019), VSD (s/p repair 1969), RA and DLBCL (s/p CHOP therapy and in remission), who was admitted on 1/24/2023 for heart failure exacerbation    Hospital Course by Diagnosis:  # Acute on chronic diastolic heart failure (EF 55-60%, 12/5/22)  # Severe TR  # Hypokalemia  Presented with SOB x 2 weeks and decreased exercise tolerance. Recently hospitalized with HF hospitalization in s/o ectopy, right heart cath 12/3/22 CI 1.1, CO 1.6. Discharge weight 109 lbs, on Lasix 40mg BID, taking as prescribed but had reported a 10lb weight gain in 1 week. Pt with known severe TR, most recent echo 12/5.     - Volume status: Torsemide 40 mg twice daily . Potassium supplement 20 mEq daily.   - AA: Continue PTA Aldactone 25 mg daily   - SGLT2i: PTA empagliflozin 10 mg qday  - Strict I&O's with daily weight at home  - Low Na diet & 2 L fluid restriction  - CVTS consulted during prior hospitalization, patient currently would like to wait on any surgical repair of tricuspid valve but discussed may need to consider if continued issues with volume     # AF with RVR  # Long-standing  persistent Atrial flutter/fibrillation   # NSVT s/p PVC ablation (11/30/2022)  # SSS s/p ppm (2019)   Presented with AF with RVR in the setting of ADHF, hemodynamically stable.   - Anticoagulation: [id number] 2, apixaban 5 mg BID  - Rate control: Discontinued PTA Atenolol given hx HFpEF, started Diltiazem ER 120mg daily. Allow for liberalized HR control given restrictive physiology.  - PPM LRL increased to 75 bpm.     # CELSA, resolved   - Baseline Cr around 1.   - Continue diuresis as listed above     # Chronic hyponatremia  Baseline appears to be around 127-132, was as low at 119 11/22. Na on admission 132. Likely component of hypervolemic hyponatremia.  -2L fluid restricted diet  - Diuresis     # RA  - Continue PTA Plaquenil 200 mg daily and Prednisone 3 mg daily  - Continue PTA Gabapentin 100 mg BID and 200 mg at bedtime  - Holding PTA Alendronate while IP     # History of Diffuse Large B-cell Lymphoma  # History of cardiac arrest  Prior hospitalization 6/2017 due to VF arrest with normal angiogram and found to have DLBCL complicated by masses on the coronary cusps and LVOT. S/p RCHOP and ECHOP therapy (total 5 rounds). No history of radiation therapy. Deemed in complete remission in 2019 and graduated out of surveillance at that time. SPEP and UPEP checked in 11/2022 with no evidence of amyloidosis.       Consults:  Electrophysiology    Medication Changes:  Stop Lasix  Stop Atenolol  Start Torsemide 40 mg BID  Start Diltiazem  mg daily  Start Potassium 20 mEq daily    Discharge medications:   Current Discharge Medication List      CONTINUE these medications which have NOT CHANGED    Details   acetaminophen (TYLENOL) 500 MG tablet Take 500 mg by mouth every 6 hours as needed for mild pain      alendronate (FOSAMAX) 70 MG tablet TAKE 1 TABLET(70 MG) BY MOUTH EVERY 7 DAYS  Qty: 12 tablet, Refills: 3    Comments: ZERO refills remain on this prescription. Your patient is requesting advance approval of refills  for this medication to PREVENT ANY MISSED DOSES  Associated Diagnoses: Steroid-induced osteoporosis      apixaban ANTICOAGULANT (ELIQUIS ANTICOAGULANT) 5 MG tablet Take 1 tablet (5 mg) by mouth 2 times daily  Qty: 180 tablet, Refills: 3    Associated Diagnoses: Paroxysmal atrial fibrillation (H)      calcium carbonate 600 mg-vitamin D 400 units (CALTRATE) 600-400 MG-UNIT per tablet Take 2 tablets by mouth daily      empagliflozin (JARDIANCE) 10 MG TABS tablet Take 1 tablet (10 mg) by mouth daily for 360 days  Qty: 90 tablet, Refills: 3    Associated Diagnoses: Acute on chronic diastolic congestive heart failure (H)      furosemide (LASIX) 40 MG tablet Take 1 tablet (40 mg) by mouth 2 times daily for 240 days  Qty: 120 tablet, Refills: 3    Associated Diagnoses: Acute on chronic diastolic congestive heart failure (H)      gabapentin (NEURONTIN) 100 MG capsule Take 1 capsule AM + 1 capsule afternoon + 2 capsules at bedtime  Qty: 360 capsule, Refills: 1    Comments: Please dispense as written - last time patient only given #270  Associated Diagnoses: Rheumatoid arthritis of both ankles, unspecified whether rheumatoid factor present (H)      hydroxychloroquine (PLAQUENIL) 200 MG tablet TAKE 1 TABLET(200 MG) BY MOUTH DAILY  Qty: 90 tablet, Refills: 0    Associated Diagnoses: Rheumatoid arthritis involving multiple sites with positive rheumatoid factor (H); Steroid-induced osteoporosis      loratadine (CLARITIN) 10 MG tablet Take 10 mg by mouth daily      multivitamin, therapeutic with minerals (THERA-VIT-M) TABS tablet Take 1 tablet by mouth daily  Qty: 30 each, Refills: 0    Associated Diagnoses: Diffuse large B-cell lymphoma, unspecified body region (H)      predniSONE (DELTASONE) 1 MG tablet Take 3 tablets (3 mg) by mouth daily  Qty: 270 tablet, Refills: 3    Associated Diagnoses: Rheumatoid arthritis involving multiple sites with positive rheumatoid factor (H)      spironolactone (ALDACTONE) 25 MG tablet Take 1  tablet (25 mg) by mouth daily  Qty: 90 tablet, Refills: 3    Associated Diagnoses: Acute on chronic diastolic heart failure (H)      Vitamin D, Cholecalciferol, 10 MCG (400 UNIT) TABS Take 1 tablet by mouth daily      atenolol (TENORMIN) 50 MG tablet Take 1 tablet (50 mg) by mouth 2 times daily  Qty: 180 tablet, Refills: 3    Associated Diagnoses: Atrial tachycardia (H)      magnesium oxide 200 MG TABS Take 200 mg by mouth daily bedtime      STATIN NOT PRESCRIBED (INTENTIONAL) Please choose reason not prescribed, below  Qty:      Associated Diagnoses: Cardiogenic shock (H)             Follow-up:  [facility] arranged 2/8/23  Dr. Eaton arranged 2/1/23  Device clinic per routine      Labs or imaging requiring follow-up after discharge:  BMP at [facility] follow up    Code status:  Full    Condition on discharge  Temp:  [97.9  F (36.6  C)-98.4  F (36.9  C)] 98.4  F (36.9  C)  Pulse:  [101-118] 118  Resp:  [15-16] 16  BP: ()/(63-82) 109/81  SpO2:  [96 %-98 %] 96 %  General: Alert, interactive, NAD  Eyes: sclera anicteric, EOMI  Neck: JVP ***, carotid 2+ bilaterally  Cardiovascular: regular rate and rhythm, normal S1 and S2, no murmurs, gallops, or rubs  Resp: clear to auscultation bilaterally, no rales, wheezes, or rhonchi  GI: Soft, nontender, nondistended. +BS.  No HSM or masses, no rebound or guarding.  Extremities: *** edema, no cyanosis or clubbing, dorsalis pedis and posterior tibialis pulses 2+ bilaterally  Skin: Warm and dry, no jaundice or rash  Neuro: CN 2-12 intact, moves all extremities equally  Psych: Alert & oriented x 3    Imaging with results:  Echo: 12/5/22  Interpretation Summary  Severe tricuspid insufficiency is present.  Moderate right ventricular dilation is present. Global right ventricular  function is mildly reduced.  Global and regional left ventricular function is normal with an EF of 55-60%.  IVC diameter >2.1 cm collapsing <50% with sniff suggests a high RA pressure  estimated at 15 mmHg or  greater.  No pericardial effusion is present.  No significant changes noted.           Patient Care Team:  Gala Stringer PA-C as PCP - General (Physician Assistant)  Archana Green PA-C as Physician Assistant (Physician Assistant)  Stacie Delgado, RN as Registered Nurse (Clinical Cardiac Electrophysiology)  Kevin Sheldon MD as MD (Plastic Surgery)  Stacie Delgado, RN as Specialty Care Coordinator (Cardiology)  Eugene Hernandez, ABBEY (Cardiology)  Maribell Domínguez MD as MD (Rheumatology)  Dima Shrestha MD as MD (Endocrinology, Diabetes, and Metabolism)  Samia Calhoun RN as Specialty Care Coordinator (Hematology & Oncology)  Thai Heredia MD as MD (Hematology & Oncology)  Maribell Domínguez MD as Assigned Rheumatology Provider  Gala Stringer PA-C as Assigned PCP  Dima Shrestha MD as Assigned Endocrinology Provider  Thai Heredia MD as Assigned Cancer Care Provider  Cammy Soares, RN as Specialty Care Coordinator (Cardiology)  Juan Eaton MD as Assigned Heart and Vascular Provider  Christian Cameron MD as MD (Dermatology)  Kandy Molina, RN as Specialty Care Coordinator (Cardiology)  Virgie John AuD as Audiologist (Audiology)  Elmira Vasquez, ELIZABETH CNP as Assigned Surgical Provider    ***  [facility] Cardiology  Patient discussed with staff cardiologist, ***, who agrees with the above documentation and plan. Documentation represents joint decision making.     {IP BILLING ON TIME DISCHARGES:14958501}   {IP BILLING ON TIME PROLONGED SERVICE:[id number]}

## 2023-01-31 ENCOUNTER — PATIENT OUTREACH (OUTPATIENT)
Dept: ONCOLOGY | Facility: CLINIC | Age: 63
End: 2023-01-31
Payer: COMMERCIAL

## 2023-01-31 DIAGNOSIS — I50.22 CHRONIC SYSTOLIC HEART FAILURE (H): Primary | ICD-10-CM

## 2023-01-31 NOTE — PATIENT INSTRUCTIONS
You were seen today in the Adult Congenital and Cardiovascular Genetics Clinic at the [facility].    Cardiology Providers you saw during your visit:  Juan Eaton MD    Diagnosis:  s/p ablation    Results:  Juan Eaton MD reviewed the results of your device check testing today in clinic.    Recommendations for you:    Please wear zio monitor for 7 days       General Cardiac Recommendations:  Continue to eat a heart healthy, low salt diet.  Continue to get 20-30 minutes of aerobic activity, 4-5 days per week.  Examples of aerobic activity include walking, running, swimming, cycling, etc.  Continue to observe good oral hygiene, with regular dental visits.      SBE prophylaxis:   Yes____  No__x__      Exercise restrictions:   Yes__X__  No____         If yes, list restrictions:  Must be allowed to rest if fatigued or SOB      Work restrictions:  Yes____  No_X___         If yes, list restrictions:    FASTING CHOLESTEROL was checked in the last 5 years YES__x_  NO___ (2022)  If no, please follow up with your primary care physician. You should have a cholesterol screening every 5 years.      Follow-up:  Follow up with Dr Eaton in 6 months with a device check prior.     If you have questions or concerns please contact us at:    Cammy Soares RN, BSN   Lesli Glaser (Scheduling)  Nurse Care Coordinator     Clinic   Adult Congenital and CV Genetics   Adult Congenital and CV Genetic  [facility]   [facility]  (P) [phone]     (P) [phone]            (F) [phone]        For after hours urgent needs, call [phone] and ask to speak to the Adult Congenital Physician on call.  Mention Job Code [id number].    For emergencies call 911.    [facility]  Mail Code [id number]  [address] [place]  [address]

## 2023-01-31 NOTE — PROGRESS NOTES
Patient discharged on 1/30/23, please follow up per TCM workflow.    Arnav Rucker on 1/31/2023 at 6:49 AM

## 2023-02-01 ENCOUNTER — ANCILLARY PROCEDURE (OUTPATIENT)
Dept: CARDIOLOGY | Facility: CLINIC | Age: 63
End: 2023-02-01
Attending: INTERNAL MEDICINE
Payer: COMMERCIAL

## 2023-02-01 ENCOUNTER — PATIENT OUTREACH (OUTPATIENT)
Dept: CARE COORDINATION | Facility: CLINIC | Age: 63
End: 2023-02-01

## 2023-02-01 VITALS
HEART RATE: 92 BPM | SYSTOLIC BLOOD PRESSURE: 109 MMHG | DIASTOLIC BLOOD PRESSURE: 76 MMHG | OXYGEN SATURATION: 98 % | BODY MASS INDEX: 22 KG/M2 | HEIGHT: 58 IN | WEIGHT: 104.8 LBS

## 2023-02-01 DIAGNOSIS — I48.0 PAROXYSMAL ATRIAL FIBRILLATION (H): Chronic | ICD-10-CM

## 2023-02-01 DIAGNOSIS — I48.0 PAROXYSMAL ATRIAL FIBRILLATION (H): Primary | Chronic | ICD-10-CM

## 2023-02-01 DIAGNOSIS — R00.1 BRADYCARDIA: ICD-10-CM

## 2023-02-01 DIAGNOSIS — I47.19 ATRIAL TACHYCARDIA (H): ICD-10-CM

## 2023-02-01 DIAGNOSIS — I07.1 SEVERE TRICUSPID REGURGITATION: ICD-10-CM

## 2023-02-01 LAB
MDC_IDC_EPISODE_DTM: NORMAL
MDC_IDC_EPISODE_DURATION: 0 S
MDC_IDC_EPISODE_DURATION: 0 S
MDC_IDC_EPISODE_DURATION: 1 S
MDC_IDC_EPISODE_DURATION: 2 S
MDC_IDC_EPISODE_ID: NORMAL
MDC_IDC_EPISODE_TYPE: NORMAL
MDC_IDC_LEAD_IMPLANT_DT: NORMAL
MDC_IDC_LEAD_IMPLANT_DT: NORMAL
MDC_IDC_LEAD_LOCATION: NORMAL
MDC_IDC_LEAD_LOCATION: NORMAL
MDC_IDC_LEAD_LOCATION_DETAIL_1: NORMAL
MDC_IDC_LEAD_LOCATION_DETAIL_1: NORMAL
MDC_IDC_LEAD_MFG: NORMAL
MDC_IDC_LEAD_MFG: NORMAL
MDC_IDC_LEAD_MODEL: NORMAL
MDC_IDC_LEAD_MODEL: NORMAL
MDC_IDC_LEAD_POLARITY_TYPE: NORMAL
MDC_IDC_LEAD_POLARITY_TYPE: NORMAL
MDC_IDC_LEAD_SERIAL: NORMAL
MDC_IDC_LEAD_SERIAL: NORMAL
MDC_IDC_LEAD_SPECIAL_FUNCTION: NORMAL
MDC_IDC_LEAD_SPECIAL_FUNCTION: NORMAL
MDC_IDC_MSMT_BATTERY_DTM: NORMAL
MDC_IDC_MSMT_BATTERY_REMAINING_LONGEVITY: 132 MO
MDC_IDC_MSMT_BATTERY_RRT_TRIGGER: 2.62
MDC_IDC_MSMT_BATTERY_STATUS: NORMAL
MDC_IDC_MSMT_BATTERY_VOLTAGE: 3.03 V
MDC_IDC_MSMT_LEADCHNL_RA_IMPEDANCE_VALUE: 342 OHM
MDC_IDC_MSMT_LEADCHNL_RA_IMPEDANCE_VALUE: 437 OHM
MDC_IDC_MSMT_LEADCHNL_RA_SENSING_INTR_AMPL: 1 MV
MDC_IDC_MSMT_LEADCHNL_RA_SENSING_INTR_AMPL: 1.75 MV
MDC_IDC_MSMT_LEADCHNL_RV_IMPEDANCE_VALUE: 342 OHM
MDC_IDC_MSMT_LEADCHNL_RV_IMPEDANCE_VALUE: 437 OHM
MDC_IDC_MSMT_LEADCHNL_RV_PACING_THRESHOLD_AMPLITUDE: 0.62 V
MDC_IDC_MSMT_LEADCHNL_RV_PACING_THRESHOLD_PULSEWIDTH: 0.4 MS
MDC_IDC_MSMT_LEADCHNL_RV_SENSING_INTR_AMPL: 8.12 MV
MDC_IDC_MSMT_LEADCHNL_RV_SENSING_INTR_AMPL: 8.12 MV
MDC_IDC_PG_IMPLANT_DTM: NORMAL
MDC_IDC_PG_MFG: NORMAL
MDC_IDC_PG_MODEL: NORMAL
MDC_IDC_PG_SERIAL: NORMAL
MDC_IDC_PG_TYPE: NORMAL
MDC_IDC_SESS_CLINIC_NAME: NORMAL
MDC_IDC_SESS_DTM: NORMAL
MDC_IDC_SESS_TYPE: NORMAL
MDC_IDC_SET_BRADY_HYSTRATE: NORMAL
MDC_IDC_SET_BRADY_LOWRATE: 60 {BEATS}/MIN
MDC_IDC_SET_BRADY_MAX_SENSOR_RATE: 130 {BEATS}/MIN
MDC_IDC_SET_BRADY_MODE: NORMAL
MDC_IDC_SET_LEADCHNL_RA_SENSING_ANODE_ELECTRODE_1: NORMAL
MDC_IDC_SET_LEADCHNL_RA_SENSING_ANODE_LOCATION_1: NORMAL
MDC_IDC_SET_LEADCHNL_RA_SENSING_CATHODE_ELECTRODE_1: NORMAL
MDC_IDC_SET_LEADCHNL_RA_SENSING_CATHODE_LOCATION_1: NORMAL
MDC_IDC_SET_LEADCHNL_RA_SENSING_POLARITY: NORMAL
MDC_IDC_SET_LEADCHNL_RA_SENSING_SENSITIVITY: NORMAL
MDC_IDC_SET_LEADCHNL_RV_PACING_AMPLITUDE: 2 V
MDC_IDC_SET_LEADCHNL_RV_PACING_ANODE_ELECTRODE_1: NORMAL
MDC_IDC_SET_LEADCHNL_RV_PACING_ANODE_LOCATION_1: NORMAL
MDC_IDC_SET_LEADCHNL_RV_PACING_CAPTURE_MODE: NORMAL
MDC_IDC_SET_LEADCHNL_RV_PACING_CATHODE_ELECTRODE_1: NORMAL
MDC_IDC_SET_LEADCHNL_RV_PACING_CATHODE_LOCATION_1: NORMAL
MDC_IDC_SET_LEADCHNL_RV_PACING_POLARITY: NORMAL
MDC_IDC_SET_LEADCHNL_RV_PACING_PULSEWIDTH: 0.4 MS
MDC_IDC_SET_LEADCHNL_RV_SENSING_ANODE_ELECTRODE_1: NORMAL
MDC_IDC_SET_LEADCHNL_RV_SENSING_ANODE_LOCATION_1: NORMAL
MDC_IDC_SET_LEADCHNL_RV_SENSING_CATHODE_ELECTRODE_1: NORMAL
MDC_IDC_SET_LEADCHNL_RV_SENSING_CATHODE_LOCATION_1: NORMAL
MDC_IDC_SET_LEADCHNL_RV_SENSING_POLARITY: NORMAL
MDC_IDC_SET_LEADCHNL_RV_SENSING_SENSITIVITY: 0.9 MV
MDC_IDC_SET_ZONE_DETECTION_INTERVAL: 400 MS
MDC_IDC_SET_ZONE_TYPE: NORMAL
MDC_IDC_STAT_BRADY_AP_VP_PERCENT: 0 %
MDC_IDC_STAT_BRADY_AP_VS_PERCENT: 0 %
MDC_IDC_STAT_BRADY_AS_VP_PERCENT: 6.54 %
MDC_IDC_STAT_BRADY_AS_VS_PERCENT: 93.46 %
MDC_IDC_STAT_BRADY_DTM_END: NORMAL
MDC_IDC_STAT_BRADY_DTM_START: NORMAL
MDC_IDC_STAT_BRADY_RA_PERCENT_PACED: 0 %
MDC_IDC_STAT_BRADY_RV_PERCENT_PACED: 6.54 %
MDC_IDC_STAT_EPISODE_RECENT_COUNT: 0
MDC_IDC_STAT_EPISODE_RECENT_COUNT: 24
MDC_IDC_STAT_EPISODE_RECENT_COUNT_DTM_END: NORMAL
MDC_IDC_STAT_EPISODE_RECENT_COUNT_DTM_START: NORMAL
MDC_IDC_STAT_EPISODE_TOTAL_COUNT: 0
MDC_IDC_STAT_EPISODE_TOTAL_COUNT: 141
MDC_IDC_STAT_EPISODE_TOTAL_COUNT: 209
MDC_IDC_STAT_EPISODE_TOTAL_COUNT: 3691
MDC_IDC_STAT_EPISODE_TOTAL_COUNT: NORMAL
MDC_IDC_STAT_EPISODE_TOTAL_COUNT_DTM_END: NORMAL
MDC_IDC_STAT_EPISODE_TOTAL_COUNT_DTM_START: NORMAL
MDC_IDC_STAT_EPISODE_TYPE: NORMAL

## 2023-02-01 PROCEDURE — G0463 HOSPITAL OUTPT CLINIC VISIT: HCPCS | Mod: 25 | Performed by: INTERNAL MEDICINE

## 2023-02-01 PROCEDURE — 93248 EXT ECG>7D<15D REV&INTERPJ: CPT | Performed by: INTERNAL MEDICINE

## 2023-02-01 PROCEDURE — 93242 EXT ECG>48HR<7D RECORDING: CPT

## 2023-02-01 PROCEDURE — G0463 HOSPITAL OUTPT CLINIC VISIT: HCPCS | Mod: 25

## 2023-02-01 PROCEDURE — 99214 OFFICE O/P EST MOD 30 MIN: CPT | Mod: 25 | Performed by: INTERNAL MEDICINE

## 2023-02-01 PROCEDURE — 93280 PM DEVICE PROGR EVAL DUAL: CPT | Performed by: INTERNAL MEDICINE

## 2023-02-01 ASSESSMENT — PAIN SCALES - GENERAL: PAINLEVEL: NO PAIN (0)

## 2023-02-01 NOTE — LETTER
2/1/2023      RE: Amelia Michel  [address]  [place] [address]       Dear Colleague,    Thank you for the opportunity to participate in the care of your patient, Amelia Michel, at the [facility]ART [facility] [place] at [facility]. Please see a copy of my visit note below.    Electrophysiology Clinic Note  HPI:   Ms. Michel is a 60 year old female who has a past medical history significant for VSD s/p repair 1969, RA, HTN, insomnia, atrial tachyarrhythmias, cardiac arrest May 2017, SSS s/p PPM 12/2019,  DLBCL, VT s/p VT ablation 12/1/2017and exudative pericardial effusion.      She was admitted from 5/15/17-5/23/17 with atrial tachyarrhythmias (SVT, AF, AFL) with symptoms of palpitations, fatigue, and nausea. An echo showed LVEF 40-45%, mildly reduced RVEF, moderate biatrial enlargement, mild mitral regurgitation, and moderate tricuspid regurgitation. . She was started on Eliquis and underwent a BRE/DCCV on 5/17/17 c/b hypotension and recurrent arrhythmia.She was started on Sotalol and chemically cardioverted. However, she had nausea and thought it was from the Sotalol so this was stopped. She reverted back to AF/AFL and a rate control strategy was adopted which was difficult given symptoms so she started amiodarone. CMRI  showed LVEF 55%, mildly dilated RV with focal area of dyskinesis in RVOT, severe bi-atrial enlargement, severe TR, mild/moderate MR, and DE at RV septal insertion site. RFA was discussed but was defered as patient had a UTI at the time with ESBL.     She was readmitted on 6/19/17-8/4/17 after suffering an out of hospital cardiac arrest.  Upon EMS arrival, she was in VF. She was externally defibrillated and had ROSC in the field. She was intubated and brought to [facility] found to be in escape rhythm 43 bpm. She was taken emergently to cath lab where coronary angiogram showed normal coronary arteries. Temporary pacemaker was placed in RA  "given sinus arrest. She was also placed on therapeutic hypothermia. She had been having nausea, diarrhea, and intermittent vomiting over the last week and generally had not been feeling well over the last month and also had saddle anesthesia with lower extremity numbness that had been evaluated by neuro as an outpatient.  Ultimately found to have DLBCL started on chemo cycles. 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     Her DLBCL was treated with R-EPOCH for one cycle while in the hospital complicated by cytopenias followed by 5 cycles of R-CHOP finished the cycles in December of 2017 currently on surveillance scans. She had a pericardial effusion for which she is on colchicine which was discontinued at the end of 6/2018.      She has then followed intermittently with EP in clinic. She had some recurrent AF in 4/2018 requiring ED visit with DCCV with recurrence and another DCCV. We restarted digoxin 4/4/18 after which she had one episode of AF s/p DCCV and has since maintained sinus rhythm and reported good symptomatic control. She was seen in 5/2018 and reported having a feeling of her heart \"rolling\" daily lasting about about 10 sec at the most. The last time she required DCCV was in 4/12/2018. Her cMRI had shown some organization of her [ericardial effusion) and she has been initiated on colchicine. Chest CT in 9/2018 showed no evidence of lymphoma recurrence and improved pericardial effusion. Last EP follow up was in 2/2019 and she was doing well without issues.    She then presented 12/12/2019 with 1 day history palpitations, headaches, and nausea. She also endorses some dizziness when walking but not at rest. She was found to be bradycardic with intermittent junctional rhythm into 30's. Electrolytes and renal function stable. Her atenolol and digoxin were held. She continued to have " junctional/bradycardia and decision was made to pursue PPM.    EP Visit 4/10/20: She presents today for follow up. She reports feeling well. She states she does have intermittent episodes of palpitations, that have not been overly bothersome to her. Device site well healed. She denies any chest pain/pressures, dizziness, lightheadedness, worsening shortness of breath, leg/ankle swelling, PND, orthopnea, or syncopal symptoms. Device site is reported well healed. Device transmission shows presenting rhythm is AP/VS @ 60 bpm. 1 NSVT, 6 Fast A&V and 272 AF episodes recorded. All EGMs show AF/AT with RVR. AF burden= 75.3%. She is taking eliquis. Normal device function. AP= 26% and = 13%. No short V-V intervals recorded. Lead trends appear stable. Battery estimates 14.2 years to OZZY. Current cardiac medications include: Elqiuis, Atenolol, Digoxin, Lasix, and Spironolactone.     EP Visit 10/9/20: She presents today for follow up. She reports feeling well. She denies any chest pain/pressures, dizziness, lightheadedness, worsening shortness of breath, leg/ankle swelling, PND, orthopnea, palpitations, or syncopal symptoms. Device interrogation shows normal pacemaker function. 2 Fast A&V and 1 NSVT episodes recorded since last remote transmission. Intrinsic rhythm = Atrial Flutter, w/ VS @ 88 bpm. AF burden = 85.9%. Pt is on Eliquis. AP = 16.7%.  = 23.6%.  Estimated battery longevity to OZZY = 13.6 years. No short v-v intervals recorded. Lead trends appear stable. Presenting 12 lead ECG shows AF/AFL and  Vent Rate 69 bpm,  ms, QTc 473 ms. Echo shows LVEF 60-65%, mildly dilated RV with normal function, moderate TR, unchanged from prior echo. Current cardiac medications include: Elqiuis, Atenolol, Digoxin, Lasix, and Spironolactone.     EP visit 8/25/21: She presents today for follow up. She had some more RVR and her digoxin was resumed. She has been having ongoing fatigue which is now improved with better rate  "control. She denies any chest pain/pressures, dizziness, lightheadedness, worsening shortness of breath, leg/ankle swelling, PND, orthopnea, palpitations, or syncopal symptoms. Device interrogations shows normal device function, stable lead parameters, intrinsic is AFL/VS/ at 60 bpm, HR histograms adequate, 100% AF/AFL since 11/2020, AP <0.1%,  49.3%. Echo today shows normal LVEF 60-65%, mildly decreased RV function, moderate TI, and dilated IVC. Current cardiac medications include: Elqiuis, Atenolol, Digoxin, Lasix, and Spironolactone. SHe was asymptomatic with her AFL with preserved EF, hence we continued conservative management.    EP visit 8/22/22: She presents today for follow-up. Patient feels she is \"in failure\" because she feels bloated and liver under tension. TTE done today shows a normal EF, dilated IVC with mod to sev TR and some pulmonary HTN, severe biatrial enlargement. Creatinine 1.09 7/20/22, weight has not increased much but she feels like it did  Otherwise no bleeding issues with Eliquis.   Her device interrogation today shows that she is V pacing 77% of the time, had runs been as nonsustained VT but available EGM suggest atrial fibrillation with RVR, battery life 11 years.  Her EKG today shows a baseline of atrial fibrillation with ventricular pacing at 68 bpm.AT that visit her lasix was adjusted temporarly.    She presents today for follow-up after she was admitted to the hospital in December 2023 for heart failure exacerbation and RVR from her chronic AF. Her diuretics were optimized She is feeling better since her discharge.  She had runs of VT in the hospital and underwent a VT ablation on 12/1/22. She is not feeling any of the symptoms she was having during the VT runs in the hospital. She has had no recurrences of VT. While in the hospital, she met with Dr Walker in the hospital who wants to keep her HR more permissive. Today her HR is at 92 bpm at rest.NSVT runs on device are more " likely AF with RVR. No bleeding issues with Eliquis.      PAST MEDICAL HISTORY:  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     VSD (ventricular septal defect)        CURRENT MEDICATIONS:  Current Outpatient Medications   Medication Sig Dispense Refill     acetaminophen (TYLENOL) 500 MG tablet Take 500 mg by mouth every 6 hours as needed for mild pain       apixaban ANTICOAGULANT (ELIQUIS ANTICOAGULANT) 5 MG tablet Take 1 tablet (5 mg) by mouth 2 times daily 180 tablet 3     calcium carbonate 600 mg-vitamin D 400 units (CALTRATE) 600-400 MG-UNIT per tablet Take 2 tablets by mouth daily       empagliflozin (JARDIANCE) 10 MG TABS tablet Take 1 tablet (10 mg) by mouth daily for 360 days 90 tablet 3     gabapentin (NEURONTIN) 100 MG capsule Take 1 capsule AM + 1 capsule afternoon + 2 capsules at bedtime 360 capsule 1     hydroxychloroquine (PLAQUENIL) 200 MG tablet TAKE 1 TABLET(200 MG) BY MOUTH DAILY (Patient taking differently: Take 200 mg by mouth every evening) 90 tablet 0     loratadine (CLARITIN) 10 MG tablet Take 10 mg by mouth daily       magnesium oxide 200 MG TABS Take 200 mg by mouth daily bedtime       multivitamin, therapeutic with minerals (THERA-VIT-M) TABS tablet Take 1 tablet by mouth daily 30 each 0     potassium chloride ER (KLOR-CON M) 20 MEQ CR tablet Take 1 tablet (20 mEq) by mouth daily 90 tablet 3     predniSONE (DELTASONE) 1 MG tablet Take 3 tablets (3 mg) by mouth daily 270 tablet 3     spironolactone (ALDACTONE) 50 MG tablet Take 1 tablet (50 mg) by mouth daily 30 tablet 3     torsemide 40 MG TABS Take 40 mg by mouth 2 times daily 180 tablet 3     Vitamin D (Cholecalciferol) 10 MCG (400 UNIT) TABS Take 1 tablet by mouth daily       alendronate (FOSAMAX) 70  MG tablet Take 1 tablet (70 mg) by mouth every 7 days 12 tablet 3     dexamethasone (DECADRON) 4 MG/ML injection Inject 4 mg IM for adrenal crisis 2 mL 3     diltiazem ER COATED BEADS (CARDIZEM CD/CARTIA XT) 240 MG 24 hr capsule Take 1 capsule (240 mg) by mouth daily 90 capsule 1     metolazone (ZAROXOLYN) 2.5 MG tablet Take 1 tablet (2.5 mg) by mouth as needed (ONLY take if instructed to do so by your cardiology team.) 5 tablet 0     predniSONE (DELTASONE) 5 MG tablet 10 mg when sick for 3 days or until back to baseline for secondary adrenal insufficiency 30 tablet 3     STATIN NOT PRESCRIBED (INTENTIONAL) Please choose reason not prescribed, below (Patient not taking: Reported on 2/10/2023)         PAST SURGICAL HISTORY:  Past Surgical History:   Procedure Laterality Date     ANESTHESIA CARDIOVERSION N/A 5/17/2017    Procedure: ANESTHESIA CARDIOVERSION;  ANESTHESIA CARDIOVERSION;  Surgeon: GENERIC ANESTHESIA PROVIDER;  Location: [facility] OR     ANESTHESIA CARDIOVERSION  3/12/2018    Procedure: ANESTHESIA CARDIOVERSION;;  Surgeon: GENERIC ANESTHESIA PROVIDER;  Location: [facility] OR     ANESTHESIA CARDIOVERSION N/A 3/23/2018    Procedure: ANESTHESIA CARDIOVERSION;  Anesthesia Cardioversion ;  Surgeon: GENERIC ANESTHESIA PROVIDER;  Location: [facility] OR     ANESTHESIA CARDIOVERSION N/A 4/12/2018    Procedure: ANESTHESIA CARDIOVERSION;  Cardioversion;  Surgeon: GENERIC ANESTHESIA PROVIDER;  Location:  OR     ARTHRODESIS WRIST  2000    Right wrist     BONE MARROW ASPIRATE &BIOPSY  7/12/2017          BONE MARROW BIOPSY W/ ASPIRATION  07/12/2017     CARDIOVERSION      5/17/17, 3/12/18, 3/23/18, 4/14/18,      COLONOSCOPY N/A 11/19/2019    Procedure: Colonoscopy, With Polypectomy And Biopsy;  Surgeon: Pj Vasques MD;  Location: [facility]     CV CORONARY ANGIOGRAM N/A 11/28/2022    Procedure: Coronary Angiogram;  Surgeon: Sundar Wood MD;  Location:  HEART CARDIAC CATH LAB     CV RIGHT HEART CATH MEASUREMENTS RECORDED N/A  11/28/2022    Procedure: Right Heart Catheterization;  Surgeon: Sundar Wood MD;  Location: [facility] CARDIAC CATH LAB     EP ABLATION PVC N/A 12/1/2022    Procedure: Ablation Premature Ventricular Contractions;  Surgeon: Juan Eaton MD;  Location:  HEART CARDIAC CATH LAB     EP PACEMAKER N/A 12/13/2019    Procedure: EP Pacemaker;  Surgeon: Pj Trent MD;  Location: [facility] CARDIAC CATH LAB     EXCISE LESION UPPER EXTREMITY Left 5/22/2018    Procedure: EXCISE LESION UPPER EXTREMITY;  Left Arm Wound Excision And Closure, Possible Submuscular Transposition of Ulnar Nerve  (Choice Anesthesia);  Surgeon: Kevin Sheldon MD;  Location:  OR     FOOT SURGERY      4 left and 2 right     FOOT SURGERY      4 Left and 2 Right      IMPLANT PACEMAKER  12/13/2019    Dr China Trent  [facility] Cardiac Cath lab      IMPLANT PACEMAKER       RELEASE CARPAL TUNNEL Bilateral      RELEASE CARPAL TUNNEL BILATERAL       REPAIR VENTRICULAR SEPTAL DEFECT  1969     TRANSPOSITION ULNAR NERVE (ELBOW) Left 5/22/2018    Procedure: TRANSPOSITION ULNAR NERVE (ELBOW);;  Surgeon: Kevin Sheldon MD;  Location:  OR     ULNAR NERVE TRANSPOSITION Left 05/22/2018     VSD REPAIR  1969     ZZC FUSION FOOT BONES,SUBTALAR Right 10/20/2020    Procedure: RIGHT SUBTALAR JOINT FUSION AND CALCANEOCUBOID FUSION;  Surgeon: Nemesio Crow MD;  Location: [facility];  Service: Orthopedics       ALLERGIES:     Allergies   Allergen Reactions     Amiodarone Other (See Comments) and Difficulty breathing     Lethargic and had trouble breathing- occurred in 2017     Blood Transfusion Related (Informational Only) Hives     Hives with platelets. Give benadryl premedication.     Metoprolol Other (See Comments)     Pt and  report that metoprolol does not work for her and also reports feeling unwell with this medication. She has been able to tolerate atenolol, which as worked in controlling her HR.      Other [No Clinical Screening -  "See Comments]      Penicillin Allergy Skin Test not performed, see antimicrobial management team progress note 7/5/17.       Penicillins      Tape [Adhesive Tape] Rash       FAMILY HISTORY:  Family History   Problem Relation Age of Onset     Breast Cancer Mother         60s     Hypertension Mother      Alzheimer Disease Mother      Cerebrovascular Disease Mother      Hypertension Father      Cerebrovascular Disease Father      Atrial fibrillation Father      Lymphoma Father      Prostate Cancer Father      Alzheimer Disease Maternal Grandmother         likely     Arthritis Maternal Grandfather      Pneumonia Maternal Grandfather      Diabetes Paternal Grandmother        SOCIAL HISTORY:  Social History     Tobacco Use     Smoking status: Never     Smokeless tobacco: Never   Vaping Use     Vaping Use: Never used   Substance Use Topics     Alcohol use: Not Currently     Comment: none     Drug use: No         Exam:  /76 (BP Location: Right arm, Patient Position: Chair, Cuff Size: Adult Regular)   Pulse 92   Ht 1.476 m (4' 10.11\")   Wt 47.5 kg (104 lb 12.8 oz)   SpO2 98%   BMI 21.82 kg/m    GENERAL APPEARANCE: alert and no distress  HEENT: no icterus, no xanthelasmas, normal pupil size and reaction, normal palate, mucosa moist, no central cyanosis  NECK: Supple.  RESPIRATORY: lungs clear to auscultation - no rales, rhonchi or wheezes, no use of accessory muscles, no retractions, respirations are unlabored, normal respiratory rate  CARDIOVASCULAR:Irregular, soft murmur.  ABDOMEN: soft, non tender, bowel sounds normal, non-distended  EXTREMITIES: peripheral pulses normal, no edema  NEURO: alert and oriented to person/place/time, normal speech, gait and affect  SKIN: no ecchymoses, no rashes  PSYCH: normal affect, cooperative      Testing/Procedures:  9/11/17 ECHOCARDIOGRAM:   Interpretation Summary  Left ventricular function, chamber size, wall motion, and wall thickness are  normal.The EF is 60-65% (traced " 60%.) GLS -18%.  The right ventricle is normal size and normal in function. The RV is mildly dilated.  Moderate tricuspid insufficiency is present.  Mild pulmonary hypertension is present (esimated PASP 55 mmHg including RA pressure.)  Dilation of the inferior vena cava is present with abnormal respiratory  variation in diameter (esitimated RAP 15 mmHg.)  This study was compared with the study from 8/31/17: TR appears slightly decreased from the earlier study.     7/24/17 CMRI:     1. The LV is normal in cavity size and wall thickness. The global systolic function is normal. The LVEF is  64%. There are no regional wall motion abnormalities. No evidence of myocardial iron overload.   2. The RV is normal in cavity size. The global systolic function is normal. The RVEF is 59%.   3. There is moderate dilation of bilateral atria.   4. Tricuspid regurgitation is present, difficult to quantify due to image quality.   5. Late gadolinium enhancement imaging shows RV insertion site hyperenhancement, a non-specific finding  seen in patients with RV volume/pressure overload.   6. There is a moderate right pleural effusion and small left pleural effusion.    CONCLUSIONS: Normal Bi-Ventricular systolic function, LVEF 64% and RVE 59%. There is no evidence of  infiltrative disease. Compared to the MRI from 5/15/2017, there is no significant change.      7/2017 RBE:  Interpretation Summary  There is a small mass (0.7 cm by 0.2 cm) on the ventricular side of the right  coronary cusp. There is a second mass (0.4 cm by 0.2 cm) observed in the LVOT,  attached to the mitro-aortic curtain. There is a third mass on the noncoronary  cusp that measures 0.4 cm by 0.2 cm that could be a healing vegetation. These  findings are compatible with endocarditis if clinical picture supports.  Right ventricular function, chamber size, wall motion, and thickness are  normal.  This study was compared with the study from 7/10/2017.  There is detection of  masses on the aortic valve and LVOT.     4/12/18 CMRI:  1. The LV is normal in cavity size and wall thickness. The global systolic function is normal. The LVEF is  54%. There are no regional wall motion abnormalities.  2. The RV is mildly dilated in cavity size. The global systolic function is normal. The RVEF is 49%.   3. Both atria are severely enlarged.  4. There is at least moderate, possibly severe tricuspid regurgitation.   5. Late gadolinium enhancement imaging shows hyperenhancement in the RV insertion site consistent with RV  pressure/volume overload.   6. There is a loculated pericardial effusion along the basal lateral and basal to mid inferior LV walls,  and along the inferior RV wall. it appears exudative in nature, and is beginning to organize. There is  diffuse pericardial enhancement.  7. There is no intracardiac thrombus or mass.  8. There is a small right pleural effusion.    9/26/18 CHEST CT:                                                                   IMPRESSION: In this patient with a history of lymphoma:  1. No evidence of disease recurrence, consistent with complete  response per Lugano criteria.  2. Stable cardiomegaly. Improved pericardial effusion.  3. Early contrast phase with heterogeneous visceral enhancement in the  abdomen, likely secondary to cardiac dysfunction.    10/9/20 ECHOCARDIOGRAM:  Interpretation Summary  VSD s/p repair in 1969  Global and regional left ventricular function is normal with an EF of 60-65%.  No flow acceleration is seen across the septum.  Global right ventricular function is normal. Mild right ventricular dilation  is present.  There is moderate tricuspid regurgitation.  The estimated PA systolic pressure is 32 mmHg but this is likely to be  underestimated due to the presence of multiple jets.  This study was compared with the study from 09/11/2017. There has been no  change in the severity of the tricuspid regurgitation or the RV size/function.  8/2021  ECHOCARDIOGRAM:  terpretation Summary  H/O of VSD repair in 1969  The visual ejection fraction is 60-65%. No wall motion abnormalities. Global  right ventricular function is mildly reduced.  Moderate tricuspid insufficiency is present.  There is a right ventricular right atrial pressure difference of 24mmHg.  IVC diameter >2.1 cm collapsing <50% with sniff suggests a high RA pressure  estimated at 15 mmHg or greater.  No pericardial effusion is present.    TTE 8/26/22:  Interpretation Summary  Left ventricular function, chamber size, wall motion, and thickness are normal.  Severe biatrial enlargement is present.  Moderate to severe tricuspid insufficiency is present.  Dilation of the inferior vena cava is present with abnormal respiratory  variation in diameter.  No pericardial effusion is present.    Assessment and Plan:   Ms. Michel is a 60 year old female who has a past medical history significant for VSD s/p repair 1969, RA, HTN, insomnia, atrial tachyarrhythmias, cardiac arrest May 2017, SSS s/p PPM 12/2019,  DLBCL, VT s/p VT ablation 12/1/2017and exudative pericardial effusion. She presents today for follow up.   She is feeling very well with the exception of feeling bloated in the abdomen.  Her echocardiogram supports the fact that she is volume overloaded and has diastolic heart failure.  She is currently chronic atrial fibrillation and is well rate controlled.  She is on a rate control strategy.    RV VT, focal:  S/p Vt ablation on 12/1/2022. No recurrences.    Sick Sinus Syndrome:   Atrial Fibrillation:  1. Stroke Prophylaxis:  CHADSVASC=+HTN, +gender  2, corresponding to a 2.2% annual stroke / systemic emolism event rate. indicating need for long term oral anticoagulation.  She is on Eliquis. No bleeding issues.   2. Rate Control: Continue Atenolol. Had RVR off digoxin and is much better on digoxin, continue.   3. Rhythm Control: Cardioversion, Antiarrhythmics and/or ablation are options for rhythm  control. She has had several DCCV last in 4/12/19. Notably, she had possible side effects from Sotalol (however, likely was due to underlying illness at the time and not from medication since she was found to have DBCL).  She is currently in chronic AFL/AF but is asymptomatic and LVEF preserved.   4. Had tachy-shelton syndrome and underwent PPM implant in 12/2019. Normal device function with stable lead parameters. She will have continued follow up with Device clinic per routine.     We will purse Ziopatch for 2 weeks to make no recurrences of VT and rate control.  Follow-up in 6 months with EP NP.    The patient states understanding and is agreeable with plan.   Juan Eaton MD [facility]  Cardiology - Electrophysiology    Total time spent on patient visit, reviewing notes, imaging, labs, orders, and completing necessary documentation: 30 minutes.  >50% of visit spent on counseling patient and/or coordination of care.        Please do not hesitate to contact me if you have any questions/concerns.     Sincerely,     Juan Eaton MD

## 2023-02-01 NOTE — PATIENT INSTRUCTIONS
It was a pleasure to see you in clinic today. Please do not hesitate to call with any questions or concerns. You are scheduled for a remote pacemaker transmission in 3 months. This will happen automatically in the night. We look forward to seeing you in clinic at your next device check in 6 months.    Mery Fink, RN  Electrophysiology Nurse Clinician  [facility]  During business hours call:  [phone]  Urgent needs after hours- please call: [phone]- select option #4 and ask for job code [phone].

## 2023-02-01 NOTE — PROGRESS NOTES
[facility] Care Resource [place]    Background: Transitional Care Management program identified per system criteria and reviewed by [facility] team for possible outreach.    Assessment: Upon chart review, CCR Team member will not proceed with patient outreach related to this episode of Transitional Care Management program due to reason below:    Patient has a follow up appointment with an appropriate provider today for hospital discharge    Plan: Transitional Care Management episode addressed appropriately per reason noted above.      Amy Crum  [facility] [place]    *Connected Care Resource Team does NOT follow patient ongoing. Referrals are identified based on internal discharge reports and the outreach is to ensure patient has an understanding of their discharge instructions.

## 2023-02-01 NOTE — NURSING NOTE
Chief Complaint   Patient presents with     Follow Up      62 year old female with history of VSD s/p repair 1969, RA, HTN, insomnia, atrial tachyarrhythmias, cardiac arrest, and DLBCL presenting for follow up. s/p PVC ablation 12/1/22     Vitals were taken and medications reconciled.    Ramón Hawkins, EMT  11:11 AM

## 2023-02-01 NOTE — PROGRESS NOTES
Electrophysiology Clinic Note  HPI:   Ms. Michel is a 60 year old female who has a past medical history significant for VSD s/p repair 1969, RA, HTN, insomnia, atrial tachyarrhythmias, cardiac arrest May 2017, SSS s/p PPM 12/2019,  DLBCL, VT s/p VT ablation 12/1/2017and exudative pericardial effusion.      She was admitted from 5/15/17-5/23/17 with atrial tachyarrhythmias (SVT, AF, AFL) with symptoms of palpitations, fatigue, and nausea. An echo showed LVEF 40-45%, mildly reduced RVEF, moderate biatrial enlargement, mild mitral regurgitation, and moderate tricuspid regurgitation. . She was started on Eliquis and underwent a BRE/DCCV on 5/17/17 c/b hypotension and recurrent arrhythmia.She was started on Sotalol and chemically cardioverted. However, she had nausea and thought it was from the Sotalol so this was stopped. She reverted back to AF/AFL and a rate control strategy was adopted which was difficult given symptoms so she started amiodarone. CMRI  showed LVEF 55%, mildly dilated RV with focal area of dyskinesis in RVOT, severe bi-atrial enlargement, severe TR, mild/moderate MR, and DE at RV septal insertion site. RFA was discussed but was defered as patient had a UTI at the time with ESBL.     She was readmitted on 6/19/17-8/4/17 after suffering an out of hospital cardiac arrest.  Upon EMS arrival, she was in VF. She was externally defibrillated and had ROSC in the field. She was intubated and brought to [facility] found to be in escape rhythm 43 bpm. She was taken emergently to cath lab where coronary angiogram showed normal coronary arteries. Temporary pacemaker was placed in RA given sinus arrest. She was also placed on therapeutic hypothermia. She had been having nausea, diarrhea, and intermittent vomiting over the last week and generally had not been feeling well over the last month and also had saddle anesthesia with lower extremity numbness that had been evaluated by neuro as an outpatient.  Ultimately found to  "have DLBCL started on chemo cycles. 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     Her DLBCL was treated with R-EPOCH for one cycle while in the hospital complicated by cytopenias followed by 5 cycles of R-CHOP finished the cycles in December of 2017 currently on surveillance scans. She had a pericardial effusion for which she is on colchicine which was discontinued at the end of 6/2018.      She has then followed intermittently with EP in clinic. She had some recurrent AF in 4/2018 requiring ED visit with DCCV with recurrence and another DCCV. We restarted digoxin 4/4/18 after which she had one episode of AF s/p DCCV and has since maintained sinus rhythm and reported good symptomatic control. She was seen in 5/2018 and reported having a feeling of her heart \"rolling\" daily lasting about about 10 sec at the most. The last time she required DCCV was in 4/12/2018. Her cMRI had shown some organization of her [ericardial effusion) and she has been initiated on colchicine. Chest CT in 9/2018 showed no evidence of lymphoma recurrence and improved pericardial effusion. Last EP follow up was in 2/2019 and she was doing well without issues.    She then presented 12/12/2019 with 1 day history palpitations, headaches, and nausea. She also endorses some dizziness when walking but not at rest. She was found to be bradycardic with intermittent junctional rhythm into 30's. Electrolytes and renal function stable. Her atenolol and digoxin were held. She continued to have junctional/bradycardia and decision was made to pursue PPM.    EP Visit 4/10/20: She presents today for follow up. She reports feeling well. She states she does have intermittent episodes of palpitations, that have not been overly bothersome to her. Device site well healed. She denies any chest pain/pressures, dizziness, lightheadedness, worsening " shortness of breath, leg/ankle swelling, PND, orthopnea, or syncopal symptoms. Device site is reported well healed. Device transmission shows presenting rhythm is AP/VS @ 60 bpm. 1 NSVT, 6 Fast A&V and 272 AF episodes recorded. All EGMs show AF/AT with RVR. AF burden= 75.3%. She is taking eliquis. Normal device function. AP= 26% and = 13%. No short V-V intervals recorded. Lead trends appear stable. Battery estimates 14.2 years to OZZY. Current cardiac medications include: Elqiuis, Atenolol, Digoxin, Lasix, and Spironolactone.     EP Visit 10/9/20: She presents today for follow up. She reports feeling well. She denies any chest pain/pressures, dizziness, lightheadedness, worsening shortness of breath, leg/ankle swelling, PND, orthopnea, palpitations, or syncopal symptoms. Device interrogation shows normal pacemaker function. 2 Fast A&V and 1 NSVT episodes recorded since last remote transmission. Intrinsic rhythm = Atrial Flutter, w/ VS @ 88 bpm. AF burden = 85.9%. Pt is on Eliquis. AP = 16.7%.  = 23.6%.  Estimated battery longevity to OZZY = 13.6 years. No short v-v intervals recorded. Lead trends appear stable. Presenting 12 lead ECG shows AF/AFL and  Vent Rate 69 bpm,  ms, QTc 473 ms. Echo shows LVEF 60-65%, mildly dilated RV with normal function, moderate TR, unchanged from prior echo. Current cardiac medications include: Elqiuis, Atenolol, Digoxin, Lasix, and Spironolactone.     EP visit 8/25/21: She presents today for follow up. She had some more RVR and her digoxin was resumed. She has been having ongoing fatigue which is now improved with better rate control. She denies any chest pain/pressures, dizziness, lightheadedness, worsening shortness of breath, leg/ankle swelling, PND, orthopnea, palpitations, or syncopal symptoms. Device interrogations shows normal device function, stable lead parameters, intrinsic is AFL/VS/ at 60 bpm, HR histograms adequate, 100% AF/AFL since 11/2020, AP <0.1%,   "49.3%. Echo today shows normal LVEF 60-65%, mildly decreased RV function, moderate TI, and dilated IVC. Current cardiac medications include: Elqiuis, Atenolol, Digoxin, Lasix, and Spironolactone. SHe was asymptomatic with her AFL with preserved EF, hence we continued conservative management.    EP visit 8/22/22: She presents today for follow-up. Patient feels she is \"in failure\" because she feels bloated and liver under tension. TTE done today shows a normal EF, dilated IVC with mod to sev TR and some pulmonary HTN, severe biatrial enlargement. Creatinine 1.09 7/20/22, weight has not increased much but she feels like it did  Otherwise no bleeding issues with Eliquis.   Her device interrogation today shows that she is V pacing 77% of the time, had runs been as nonsustained VT but available EGM suggest atrial fibrillation with RVR, battery life 11 years.  Her EKG today shows a baseline of atrial fibrillation with ventricular pacing at 68 bpm.AT that visit her lasix was adjusted temporarly.    She presents today for follow-up after she was admitted to the hospital in December 2023 for heart failure exacerbation and RVR from her chronic AF. Her diuretics were optimized She is feeling better since her discharge.  She had runs of VT in the hospital and underwent a VT ablation on 12/1/22. She is not feeling any of the symptoms she was having during the VT runs in the hospital. She has had no recurrences of VT. While in the hospital, she met with Dr Walker in the hospital who wants to keep her HR more permissive. Today her HR is at 92 bpm at rest.NSVT runs on device are more likely AF with RVR. No bleeding issues with Eliquis.      PAST MEDICAL HISTORY:  Past Medical History:   Diagnosis Date     Arthritis      Cardiac arrest (H)      History of blood clots 2017    PICC line Right arm      History of chemotherapy      History of transfusion      Hypertension      Neuropathy      Physical deconditioning      Positive PPD, " 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     VSD (ventricular septal defect)        CURRENT MEDICATIONS:  Current Outpatient Medications   Medication Sig Dispense Refill     acetaminophen (TYLENOL) 500 MG tablet Take 500 mg by mouth every 6 hours as needed for mild pain       apixaban ANTICOAGULANT (ELIQUIS ANTICOAGULANT) 5 MG tablet Take 1 tablet (5 mg) by mouth 2 times daily 180 tablet 3     calcium carbonate 600 mg-vitamin D 400 units (CALTRATE) 600-400 MG-UNIT per tablet Take 2 tablets by mouth daily       empagliflozin (JARDIANCE) 10 MG TABS tablet Take 1 tablet (10 mg) by mouth daily for 360 days 90 tablet 3     gabapentin (NEURONTIN) 100 MG capsule Take 1 capsule AM + 1 capsule afternoon + 2 capsules at bedtime 360 capsule 1     hydroxychloroquine (PLAQUENIL) 200 MG tablet TAKE 1 TABLET(200 MG) BY MOUTH DAILY (Patient taking differently: Take 200 mg by mouth every evening) 90 tablet 0     loratadine (CLARITIN) 10 MG tablet Take 10 mg by mouth daily       magnesium oxide 200 MG TABS Take 200 mg by mouth daily bedtime       multivitamin, therapeutic with minerals (THERA-VIT-M) TABS tablet Take 1 tablet by mouth daily 30 each 0     potassium chloride ER (KLOR-CON M) 20 MEQ CR tablet Take 1 tablet (20 mEq) by mouth daily 90 tablet 3     predniSONE (DELTASONE) 1 MG tablet Take 3 tablets (3 mg) by mouth daily 270 tablet 3     spironolactone (ALDACTONE) 50 MG tablet Take 1 tablet (50 mg) by mouth daily 30 tablet 3     torsemide 40 MG TABS Take 40 mg by mouth 2 times daily 180 tablet 3     Vitamin D (Cholecalciferol) 10 MCG (400 UNIT) TABS Take 1 tablet by mouth daily       alendronate (FOSAMAX) 70 MG tablet Take 1 tablet (70 mg) by mouth every 7 days 12 tablet 3     dexamethasone (DECADRON) 4 MG/ML injection Inject 4 mg IM for adrenal crisis 2 mL 3     diltiazem ER COATED BEADS (CARDIZEM CD/CARTIA XT) 240 MG 24 hr capsule Take 1 capsule (240 mg) by mouth daily 90 capsule 1     metolazone (ZAROXOLYN) 2.5 MG tablet Take 1 tablet (2.5 mg) by  mouth as needed (ONLY take if instructed to do so by your cardiology team.) 5 tablet 0     predniSONE (DELTASONE) 5 MG tablet 10 mg when sick for 3 days or until back to baseline for secondary adrenal insufficiency 30 tablet 3     STATIN NOT PRESCRIBED (INTENTIONAL) Please choose reason not prescribed, below (Patient not taking: Reported on 2/10/2023)         PAST SURGICAL HISTORY:  Past Surgical History:   Procedure Laterality Date     ANESTHESIA CARDIOVERSION N/A 5/17/2017    Procedure: ANESTHESIA CARDIOVERSION;  ANESTHESIA CARDIOVERSION;  Surgeon: GENERIC ANESTHESIA PROVIDER;  Location: [facility]     ANESTHESIA CARDIOVERSION  3/12/2018    Procedure: ANESTHESIA CARDIOVERSION;;  Surgeon: GENERIC ANESTHESIA PROVIDER;  Location: [facility]     ANESTHESIA CARDIOVERSION N/A 3/23/2018    Procedure: ANESTHESIA CARDIOVERSION;  Anesthesia Cardioversion ;  Surgeon: GENERIC ANESTHESIA PROVIDER;  Location: [facility]     ANESTHESIA CARDIOVERSION N/A 4/12/2018    Procedure: ANESTHESIA CARDIOVERSION;  Cardioversion;  Surgeon: GENERIC ANESTHESIA PROVIDER;  Location: [facility]     ARTHRODESIS WRIST  2000    Right wrist     BONE MARROW ASPIRATE &BIOPSY  7/12/2017          BONE MARROW BIOPSY W/ ASPIRATION  07/12/2017     CARDIOVERSION      5/17/17, 3/12/18, 3/23/18, 4/14/18,      COLONOSCOPY N/A 11/19/2019    Procedure: Colonoscopy, With Polypectomy And Biopsy;  Surgeon: Pj Vasques MD;  Location: [facility]     CV CORONARY ANGIOGRAM N/A 11/28/2022    Procedure: Coronary Angiogram;  Surgeon: Sundar Wood MD;  Location:  HE[facility] CARDIAC CATH LAB     CV RIGHT HEART CATH MEASUREMENTS RECORDED N/A 11/28/2022    Procedure: Right Heart Catheterization;  Surgeon: Sundar Wood MD;  Location:  HEART CARDIAC CATH LAB     EP ABLATION PVC N/A 12/1/2022    Procedure: Ablation Premature Ventricular Contractions;  Surgeon: Juan Eaton MD;  Location:  HEART CARDIAC CATH LAB     EP PACEMAKER N/A 12/13/2019    Procedure: EP Pacemaker;   Surgeon: Pj Trent MD;  Location:  HEART CARDIAC CATH LAB     EXCISE LESION UPPER EXTREMITY Left 5/22/2018    Procedure: EXCISE LESION UPPER EXTREMITY;  Left Arm Wound Excision And Closure, Possible Submuscular Transposition of Ulnar Nerve  (Choice Anesthesia);  Surgeon: Kevin Sheldon MD;  [facility]     FOOT SURGERY      4 left and 2 right     FOOT SURGERY      4 Left and 2 Right      IMPLANT PACEMAKER  12/13/2019    Dr China Trent   [facility]      IMPLANT PACEMAKER       RELEASE CARPAL TUNNEL Bilateral      RELEASE CARPAL TUNNEL BILATERAL       REPAIR VENTRICULAR SEPTAL DEFECT  1969     TRANSPOSITION ULNAR NERVE (ELBOW) Left 5/22/2018    Procedure: TRANSPOSITION ULNAR NERVE (ELBOW);;  Surgeon: Kevin Sheldon MD;  Location: [facility]     ULNAR NERVE TRANSPOSITION Left 05/22/2018     VSD REPAIR  1969     ZZC FUSION FOOT BONES,SUBTALAR Right 10/20/2020    Procedure: RIGHT SUBTALAR JOINT FUSION AND CALCANEOCUBOID FUSION;  Surgeon: Nemesio Crow MD;  Location: [facility];  Service: Orthopedics       ALLERGIES:     Allergies   Allergen Reactions     Amiodarone Other (See Comments) and Difficulty breathing     Lethargic and had trouble breathing- occurred in 2017     Blood Transfusion Related (Informational Only) Hives     Hives with platelets. Give benadryl premedication.     Metoprolol Other (See Comments)     Pt and  report that metoprolol does not work for her and also reports feeling unwell with this medication. She has been able to tolerate atenolol, which as worked in controlling her HR.      Other [No Clinical Screening - See Comments]      Penicillin Allergy Skin Test not performed, see antimicrobial management team progress note 7/5/17.       Penicillins      Tape [Adhesive Tape] Rash       FAMILY HISTORY:  Family History   Problem Relation Age of Onset     Breast Cancer Mother         60s     Hypertension Mother      Alzheimer Disease Mother       "Cerebrovascular Disease Mother      Hypertension Father      Cerebrovascular Disease Father      Atrial fibrillation Father      Lymphoma Father      Prostate Cancer Father      Alzheimer Disease Maternal Grandmother         likely     Arthritis Maternal Grandfather      Pneumonia Maternal Grandfather      Diabetes Paternal Grandmother        SOCIAL HISTORY:  Social History     Tobacco Use     Smoking status: Never     Smokeless tobacco: Never   Vaping Use     Vaping Use: Never used   Substance Use Topics     Alcohol use: Not Currently     Comment: none     Drug use: No         Exam:  /76 (BP Location: Right arm, Patient Position: Chair, Cuff Size: Adult Regular)   Pulse 92   Ht 1.476 m (4' 10.11\")   Wt 47.5 kg (104 lb 12.8 oz)   SpO2 98%   BMI 21.82 kg/m    GENERAL APPEARANCE: alert and no distress  HEENT: no icterus, no xanthelasmas, normal pupil size and reaction, normal palate, mucosa moist, no central cyanosis  NECK: Supple.  RESPIRATORY: lungs clear to auscultation - no rales, rhonchi or wheezes, no use of accessory muscles, no retractions, respirations are unlabored, normal respiratory rate  CARDIOVASCULAR:Irregular, soft murmur.  ABDOMEN: soft, non tender, bowel sounds normal, non-distended  EXTREMITIES: peripheral pulses normal, no edema  NEURO: alert and oriented to person/place/time, normal speech, gait and affect  SKIN: no ecchymoses, no rashes  PSYCH: normal affect, cooperative      Testing/Procedures:  9/11/17 ECHOCARDIOGRAM:   Interpretation Summary  Left ventricular function, chamber size, wall motion, and wall thickness are  normal.The EF is 60-65% (traced 60%.) GLS -18%.  The right ventricle is normal size and normal in function. The RV is mildly dilated.  Moderate tricuspid insufficiency is present.  Mild pulmonary hypertension is present (esimated PASP 55 mmHg including RA pressure.)  Dilation of the inferior vena cava is present with abnormal respiratory  variation in diameter " (esitimated RAP 15 mmHg.)  This study was compared with the study from 8/31/17: TR appears slightly decreased from the earlier study.     7/24/17 CMRI:     1. The LV is normal in cavity size and wall thickness. The global systolic function is normal. The LVEF is  64%. There are no regional wall motion abnormalities. No evidence of myocardial iron overload.   2. The RV is normal in cavity size. The global systolic function is normal. The RVEF is 59%.   3. There is moderate dilation of bilateral atria.   4. Tricuspid regurgitation is present, difficult to quantify due to image quality.   5. Late gadolinium enhancement imaging shows RV insertion site hyperenhancement, a non-specific finding  seen in patients with RV volume/pressure overload.   6. There is a moderate right pleural effusion and small left pleural effusion.    CONCLUSIONS: Normal Bi-Ventricular systolic function, LVEF 64% and RVE 59%. There is no evidence of  infiltrative disease. Compared to the MRI from 5/15/2017, there is no significant change.      7/2017 BRE:  Interpretation Summary  There is a small mass (0.7 cm by 0.2 cm) on the ventricular side of the right  coronary cusp. There is a second mass (0.4 cm by 0.2 cm) observed in the LVOT,  attached to the mitro-aortic curtain. There is a third mass on the noncoronary  cusp that measures 0.4 cm by 0.2 cm that could be a healing vegetation. These  findings are compatible with endocarditis if clinical picture supports.  Right ventricular function, chamber size, wall motion, and thickness are  normal.  This study was compared with the study from 7/10/2017.  There is detection of masses on the aortic valve and LVOT.     4/12/18 CMRI:  1. The LV is normal in cavity size and wall thickness. The global systolic function is normal. The LVEF is  54%. There are no regional wall motion abnormalities.  2. The RV is mildly dilated in cavity size. The global systolic function is normal. The RVEF is 49%.   3. Both  atria are severely enlarged.  4. There is at least moderate, possibly severe tricuspid regurgitation.   5. Late gadolinium enhancement imaging shows hyperenhancement in the RV insertion site consistent with RV  pressure/volume overload.   6. There is a loculated pericardial effusion along the basal lateral and basal to mid inferior LV walls,  and along the inferior RV wall. it appears exudative in nature, and is beginning to organize. There is  diffuse pericardial enhancement.  7. There is no intracardiac thrombus or mass.  8. There is a small right pleural effusion.    9/26/18 CHEST CT:                                                                   IMPRESSION: In this patient with a history of lymphoma:  1. No evidence of disease recurrence, consistent with complete  response per Lugano criteria.  2. Stable cardiomegaly. Improved pericardial effusion.  3. Early contrast phase with heterogeneous visceral enhancement in the  abdomen, likely secondary to cardiac dysfunction.    10/9/20 ECHOCARDIOGRAM:  Interpretation Summary  VSD s/p repair in 1969  Global and regional left ventricular function is normal with an EF of 60-65%.  No flow acceleration is seen across the septum.  Global right ventricular function is normal. Mild right ventricular dilation  is present.  There is moderate tricuspid regurgitation.  The estimated PA systolic pressure is 32 mmHg but this is likely to be  underestimated due to the presence of multiple jets.  This study was compared with the study from 09/11/2017. There has been no  change in the severity of the tricuspid regurgitation or the RV size/function.  8/2021 ECHOCARDIOGRAM:  terpretation Summary  H/O of VSD repair in 1969  The visual ejection fraction is 60-65%. No wall motion abnormalities. Global  right ventricular function is mildly reduced.  Moderate tricuspid insufficiency is present.  There is a right ventricular right atrial pressure difference of 24mmHg.  IVC diameter >2.1 cm  collapsing <50% with sniff suggests a high RA pressure  estimated at 15 mmHg or greater.  No pericardial effusion is present.    TTE 8/26/22:  Interpretation Summary  Left ventricular function, chamber size, wall motion, and thickness are normal.  Severe biatrial enlargement is present.  Moderate to severe tricuspid insufficiency is present.  Dilation of the inferior vena cava is present with abnormal respiratory  variation in diameter.  No pericardial effusion is present.    Assessment and Plan:   Ms. Michel is a 60 year old female who has a past medical history significant for VSD s/p repair 1969, RA, HTN, insomnia, atrial tachyarrhythmias, cardiac arrest May 2017, SSS s/p PPM 12/2019,  DLBCL, VT s/p VT ablation 12/1/2017and exudative pericardial effusion. She presents today for follow up.   She is feeling very well with the exception of feeling bloated in the abdomen.  Her echocardiogram supports the fact that she is volume overloaded and has diastolic heart failure.  She is currently chronic atrial fibrillation and is well rate controlled.  She is on a rate control strategy.    RV VT, focal:  S/p Vt ablation on 12/1/2022. No recurrences.    Sick Sinus Syndrome:   Atrial Fibrillation:  1. Stroke Prophylaxis:  CHADSVASC=+HTN, +gender  2, corresponding to a 2.2% annual stroke / systemic emolism event rate. indicating need for long term oral anticoagulation.  She is on Eliquis. No bleeding issues.   2. Rate Control: Continue Atenolol. Had RVR off digoxin and is much better on digoxin, continue.   3. Rhythm Control: Cardioversion, Antiarrhythmics and/or ablation are options for rhythm control. She has had several DCCV last in 4/12/19. Notably, she had possible side effects from Sotalol (however, likely was due to underlying illness at the time and not from medication since she was found to have DBCL).  She is currently in chronic AFL/AF but is asymptomatic and LVEF preserved.   4. Had tachy-shelton syndrome and  underwent PPM implant in 12/2019. Normal device function with stable lead parameters. She will have continued follow up with Device clinic per routine.     We will purse Ziopatch for 2 weeks to make no recurrences of VT and rate control.  Follow-up in 6 months with EP NP.    The patient states understanding and is agreeable with plan.   Juan Eaton MD [facility]  Cardiology - Electrophysiology    Total time spent on patient visit, reviewing notes, imaging, labs, orders, and completing necessary documentation: 30 minutes.  >50% of visit spent on counseling patient and/or coordination of care.

## 2023-02-01 NOTE — PROGRESS NOTES
"Per Dr. Eaton, patient to have 7 day Zio monitor placed.  Diagnosis: paroxysmal atrial fibrillation [I48.0]  Monitor placed: {YES / NO:864644::\"Yes\"}  Patient Instructed: {YES / NO:778459::\"Yes\"}  Patient verbalized understanding: {YES / NO:188273::\"Yes\"}  Holter # [id number]    "

## 2023-02-07 ENCOUNTER — ANCILLARY PROCEDURE (OUTPATIENT)
Dept: CARDIOLOGY | Facility: CLINIC | Age: 63
End: 2023-02-07
Attending: INTERNAL MEDICINE
Payer: COMMERCIAL

## 2023-02-07 DIAGNOSIS — R00.1 BRADYCARDIA: ICD-10-CM

## 2023-02-07 PROCEDURE — 99207 CARDIAC DEVICE CHECK - REMOTE: CPT | Performed by: INTERNAL MEDICINE

## 2023-02-08 ENCOUNTER — HOSPITAL ENCOUNTER (OUTPATIENT)
Dept: PHYSICAL THERAPY | Facility: REHABILITATION | Age: 63
Discharge: HOME | End: 2023-02-08
Payer: COMMERCIAL

## 2023-02-08 ENCOUNTER — LAB (OUTPATIENT)
Dept: LAB | Facility: CLINIC | Age: 63
End: 2023-02-08
Payer: COMMERCIAL

## 2023-02-08 ENCOUNTER — OFFICE VISIT (OUTPATIENT)
Dept: CARDIOLOGY | Facility: CLINIC | Age: 63
End: 2023-02-08
Attending: NURSE PRACTITIONER
Payer: COMMERCIAL

## 2023-02-08 VITALS
DIASTOLIC BLOOD PRESSURE: 81 MMHG | HEIGHT: 57 IN | OXYGEN SATURATION: 96 % | SYSTOLIC BLOOD PRESSURE: 121 MMHG | BODY MASS INDEX: 22.65 KG/M2 | HEART RATE: 123 BPM | WEIGHT: 105 LBS

## 2023-02-08 DIAGNOSIS — I50.9 CONGESTIVE HEART FAILURE, UNSPECIFIED HF CHRONICITY, UNSPECIFIED HEART FAILURE TYPE (H): ICD-10-CM

## 2023-02-08 DIAGNOSIS — I50.22 CHRONIC SYSTOLIC HEART FAILURE (H): ICD-10-CM

## 2023-02-08 DIAGNOSIS — I07.1 TRICUSPID VALVE INSUFFICIENCY, UNSPECIFIED ETIOLOGY: Primary | ICD-10-CM

## 2023-02-08 DIAGNOSIS — M62.81 MUSCLE WEAKNESS (GENERALIZED): ICD-10-CM

## 2023-02-08 DIAGNOSIS — M25.511 ACUTE PAIN OF RIGHT SHOULDER: Primary | ICD-10-CM

## 2023-02-08 LAB
ANION GAP SERPL CALCULATED.3IONS-SCNC: 14 MMOL/L (ref 7–15)
BUN SERPL-MCNC: 44.8 MG/DL (ref 8–23)
CALCIUM SERPL-MCNC: 10 MG/DL (ref 8.8–10.2)
CHLORIDE SERPL-SCNC: 86 MMOL/L (ref 98–107)
CREAT SERPL-MCNC: 1.47 MG/DL (ref 0.51–1.17)
DEPRECATED HCO3 PLAS-SCNC: 27 MMOL/L (ref 22–29)
GFR SERPL CREATININE-BSD FRML MDRD: 40 ML/MIN/1.73M2
GLUCOSE SERPL-MCNC: 113 MG/DL (ref 70–99)
MDC_IDC_EPISODE_DTM: NORMAL
MDC_IDC_EPISODE_DURATION: 1 S
MDC_IDC_EPISODE_DURATION: 2 S
MDC_IDC_EPISODE_DURATION: 3 S
MDC_IDC_EPISODE_DURATION: 4 S
MDC_IDC_EPISODE_ID: NORMAL
MDC_IDC_EPISODE_TYPE: NORMAL
MDC_IDC_LEAD_IMPLANT_DT: NORMAL
MDC_IDC_LEAD_IMPLANT_DT: NORMAL
MDC_IDC_LEAD_LOCATION: NORMAL
MDC_IDC_LEAD_LOCATION: NORMAL
MDC_IDC_LEAD_LOCATION_DETAIL_1: NORMAL
MDC_IDC_LEAD_LOCATION_DETAIL_1: NORMAL
MDC_IDC_LEAD_MFG: NORMAL
MDC_IDC_LEAD_MFG: NORMAL
MDC_IDC_LEAD_MODEL: NORMAL
MDC_IDC_LEAD_MODEL: NORMAL
MDC_IDC_LEAD_POLARITY_TYPE: NORMAL
MDC_IDC_LEAD_POLARITY_TYPE: NORMAL
MDC_IDC_LEAD_SERIAL: NORMAL
MDC_IDC_LEAD_SERIAL: NORMAL
MDC_IDC_LEAD_SPECIAL_FUNCTION: NORMAL
MDC_IDC_LEAD_SPECIAL_FUNCTION: NORMAL
MDC_IDC_MSMT_BATTERY_DTM: NORMAL
MDC_IDC_MSMT_BATTERY_REMAINING_LONGEVITY: 133 MO
MDC_IDC_MSMT_BATTERY_RRT_TRIGGER: 2.62
MDC_IDC_MSMT_BATTERY_STATUS: NORMAL
MDC_IDC_MSMT_BATTERY_VOLTAGE: 3.02 V
MDC_IDC_MSMT_LEADCHNL_RA_IMPEDANCE_VALUE: 399 OHM
MDC_IDC_MSMT_LEADCHNL_RA_IMPEDANCE_VALUE: 494 OHM
MDC_IDC_MSMT_LEADCHNL_RA_PACING_THRESHOLD_AMPLITUDE: 0.38 V
MDC_IDC_MSMT_LEADCHNL_RA_PACING_THRESHOLD_PULSEWIDTH: 0.4 MS
MDC_IDC_MSMT_LEADCHNL_RA_SENSING_INTR_AMPL: 1 MV
MDC_IDC_MSMT_LEADCHNL_RA_SENSING_INTR_AMPL: 1.75 MV
MDC_IDC_MSMT_LEADCHNL_RV_IMPEDANCE_VALUE: 399 OHM
MDC_IDC_MSMT_LEADCHNL_RV_IMPEDANCE_VALUE: 475 OHM
MDC_IDC_MSMT_LEADCHNL_RV_PACING_THRESHOLD_AMPLITUDE: 0.75 V
MDC_IDC_MSMT_LEADCHNL_RV_PACING_THRESHOLD_PULSEWIDTH: 0.4 MS
MDC_IDC_MSMT_LEADCHNL_RV_SENSING_INTR_AMPL: 7.5 MV
MDC_IDC_MSMT_LEADCHNL_RV_SENSING_INTR_AMPL: 7.5 MV
MDC_IDC_PG_IMPLANT_DTM: NORMAL
MDC_IDC_PG_MFG: NORMAL
MDC_IDC_PG_MODEL: NORMAL
MDC_IDC_PG_SERIAL: NORMAL
MDC_IDC_PG_TYPE: NORMAL
MDC_IDC_SESS_CLINIC_NAME: NORMAL
MDC_IDC_SESS_DTM: NORMAL
MDC_IDC_SESS_TYPE: NORMAL
MDC_IDC_SET_BRADY_HYSTRATE: NORMAL
MDC_IDC_SET_BRADY_LOWRATE: 75 {BEATS}/MIN
MDC_IDC_SET_BRADY_MAX_SENSOR_RATE: 130 {BEATS}/MIN
MDC_IDC_SET_BRADY_MODE: NORMAL
MDC_IDC_SET_LEADCHNL_RA_SENSING_ANODE_ELECTRODE_1: NORMAL
MDC_IDC_SET_LEADCHNL_RA_SENSING_ANODE_LOCATION_1: NORMAL
MDC_IDC_SET_LEADCHNL_RA_SENSING_CATHODE_ELECTRODE_1: NORMAL
MDC_IDC_SET_LEADCHNL_RA_SENSING_CATHODE_LOCATION_1: NORMAL
MDC_IDC_SET_LEADCHNL_RA_SENSING_POLARITY: NORMAL
MDC_IDC_SET_LEADCHNL_RA_SENSING_SENSITIVITY: NORMAL
MDC_IDC_SET_LEADCHNL_RV_PACING_AMPLITUDE: 2 V
MDC_IDC_SET_LEADCHNL_RV_PACING_ANODE_ELECTRODE_1: NORMAL
MDC_IDC_SET_LEADCHNL_RV_PACING_ANODE_LOCATION_1: NORMAL
MDC_IDC_SET_LEADCHNL_RV_PACING_CAPTURE_MODE: NORMAL
MDC_IDC_SET_LEADCHNL_RV_PACING_CATHODE_ELECTRODE_1: NORMAL
MDC_IDC_SET_LEADCHNL_RV_PACING_CATHODE_LOCATION_1: NORMAL
MDC_IDC_SET_LEADCHNL_RV_PACING_POLARITY: NORMAL
MDC_IDC_SET_LEADCHNL_RV_PACING_PULSEWIDTH: 0.4 MS
MDC_IDC_SET_LEADCHNL_RV_SENSING_ANODE_ELECTRODE_1: NORMAL
MDC_IDC_SET_LEADCHNL_RV_SENSING_ANODE_LOCATION_1: NORMAL
MDC_IDC_SET_LEADCHNL_RV_SENSING_CATHODE_ELECTRODE_1: NORMAL
MDC_IDC_SET_LEADCHNL_RV_SENSING_CATHODE_LOCATION_1: NORMAL
MDC_IDC_SET_LEADCHNL_RV_SENSING_POLARITY: NORMAL
MDC_IDC_SET_LEADCHNL_RV_SENSING_SENSITIVITY: 0.9 MV
MDC_IDC_SET_ZONE_DETECTION_INTERVAL: 400 MS
MDC_IDC_SET_ZONE_TYPE: NORMAL
MDC_IDC_STAT_BRADY_AP_VP_PERCENT: 0 %
MDC_IDC_STAT_BRADY_AP_VS_PERCENT: 0 %
MDC_IDC_STAT_BRADY_AS_VP_PERCENT: 8.63 %
MDC_IDC_STAT_BRADY_AS_VS_PERCENT: 91.37 %
MDC_IDC_STAT_BRADY_DTM_END: NORMAL
MDC_IDC_STAT_BRADY_DTM_START: NORMAL
MDC_IDC_STAT_BRADY_RA_PERCENT_PACED: 0 %
MDC_IDC_STAT_BRADY_RV_PERCENT_PACED: 8.63 %
MDC_IDC_STAT_EPISODE_RECENT_COUNT: 0
MDC_IDC_STAT_EPISODE_RECENT_COUNT: 1
MDC_IDC_STAT_EPISODE_RECENT_COUNT: 3
MDC_IDC_STAT_EPISODE_RECENT_COUNT_DTM_END: NORMAL
MDC_IDC_STAT_EPISODE_RECENT_COUNT_DTM_START: NORMAL
MDC_IDC_STAT_EPISODE_TOTAL_COUNT: 0
MDC_IDC_STAT_EPISODE_TOTAL_COUNT: 141
MDC_IDC_STAT_EPISODE_TOTAL_COUNT: 211
MDC_IDC_STAT_EPISODE_TOTAL_COUNT: 3691
MDC_IDC_STAT_EPISODE_TOTAL_COUNT: NORMAL
MDC_IDC_STAT_EPISODE_TOTAL_COUNT_DTM_END: NORMAL
MDC_IDC_STAT_EPISODE_TOTAL_COUNT_DTM_START: NORMAL
MDC_IDC_STAT_EPISODE_TYPE: NORMAL
POTASSIUM SERPL-SCNC: 4.6 MMOL/L (ref 3.4–5.3)
SODIUM SERPL-SCNC: 127 MMOL/L (ref 136–145)

## 2023-02-08 PROCEDURE — 80048 BASIC METABOLIC PNL TOTAL CA: CPT | Performed by: PATHOLOGY

## 2023-02-08 PROCEDURE — G0463 HOSPITAL OUTPT CLINIC VISIT: HCPCS | Performed by: NURSE PRACTITIONER

## 2023-02-08 PROCEDURE — 99215 OFFICE O/P EST HI 40 MIN: CPT | Performed by: NURSE PRACTITIONER

## 2023-02-08 PROCEDURE — 97110 THERAPEUTIC EXERCISES: CPT | Mod: GP | Performed by: PHYSICAL THERAPIST

## 2023-02-08 PROCEDURE — 36415 COLL VENOUS BLD VENIPUNCTURE: CPT | Performed by: PATHOLOGY

## 2023-02-08 PROCEDURE — G0463 HOSPITAL OUTPT CLINIC VISIT: HCPCS

## 2023-02-08 PROCEDURE — 97140 MANUAL THERAPY 1/> REGIONS: CPT | Mod: GP | Performed by: PHYSICAL THERAPIST

## 2023-02-08 RX ORDER — METOLAZONE 2.5 MG/1
2.5 TABLET ORAL PRN
Qty: 5 TABLET | Refills: 0 | Status: ON HOLD | OUTPATIENT
Start: 2023-02-08 | End: 2024-01-24

## 2023-02-08 ASSESSMENT — PAIN SCALES - GENERAL: PAINLEVEL: NO PAIN (0)

## 2023-02-08 NOTE — NURSING NOTE
Diet: Patient instructed regarding a heart failure healthy diet, including discussion of reduced fat and 2000 mg daily sodium restriction, daily weights, medication purpose and compliance, fluid restrictions and resources for patient and family to access for assistance with heart failure management.       Labs: Patient was given results of the laboratory testing obtained today and patient was instructed about when to return for the next laboratory testing.     Med Reconcile: Reviewed and verified all current medications with the patient. The updated medication list was printed and given to the patient.     Med changes: hold torsemide until Friday morning.      Return Appointment: Patient given instructions regarding scheduling next clinic visit: Labs 2/13, CORE in 1 months with labs     Patient stated she understood all health information given and agreed to call with further questions or concerns.     Kandy Molina RN

## 2023-02-08 NOTE — PROGRESS NOTES
[facility] Cardiology   CORE Clinic      HPI:   Ms. 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 She was recently admitted 11/19/-12/5/22 with ADHF and frequent NSVT, now s/p VT ablation. She presents to [facility] for hospital follow up.     With regards to her cardiac conditions, as noted, Ms. Michel presented to [facility] on 11/19 with MONTALVO, BLE edema, and 8 pound weight gain over the course of one week. Chest X-ray was suspicious for pulmonary edema, NT-P BNP 1,913, Troponin T 21-->12. EDW per chart review 116-120; admission weight 128. She was admitted w/ decompensated diastolic heart failure with pictutre c/b frequent non sustained ventricular tachycardia with RHC on 11/28/22 notable for cardiogenic shock with a cardiac index of 1.2 and CO of 1.6. Etiology thought to most likely secondary to frequent non sustained ventricular tachycardia in the setting of possible restrictive/constrictive physiology given her malignancy history (albeit presumably in remission). CAD ruled on by coronary angiogram completed on 11/28/22. She underwent VT ablation on 12/1/22 and had successful resumption of her home medications with increased diuretic dosing and adddition of an SGLT2 inhibitor. She was diuresed 19 lbs to a weight of 109 lbs at time of discharge.     At CORE visit on 1/9/23 Ms. Michel was hypervolemic as  evident by weight gain, tachycardia, and peripheral edema. Review of labs demonstrated persistent hyponatremia with Na 127 and bump in Cr to 1.35. Lasix was increase 60 mg BID. Still awaiting for PA for cardiomems device. Admitted 1/24-1/29 for CHF  exacerbation. Diuresed and switched to torsemide 40 mg BID. Discharged at a weight of 107 lb.     Device interrogation from 2/7 with rates 120s-140, AFL. Diltiazem increased to 240 mg daily. Starting tomorrow.     Today, Ms. Michel presents to [facility] with her . Since discharge she reports feeling significantly better. She continues to monitor her weights at home at they are currently ranging 100-102 lbs. Of note, Ms. Michel tells me that on 2/6 she became ill with several episodes of vomiting. She thinks it was due to something she ate. Attributes further weight loss to brief illness. Energy is improved. Has some MONTALVO but feels it proportional to the activity. Denies chest pain, SOB, palpitations, lightheadedness, orthopnea, PND, peripheral edema, abdominal distention, syncope, presyncope. Remains on eliquis for CVA prophylaxis. Denies any bleeding episodes. Home -105/85-89.       Cardiac Medications:   - Eliquis 5 mg BID  - Diltiazem 240 mg daily   - Jardiance 10 mg daily   - Torsemide 40 mg BID  - Spironolactone 50 mg daily       PAST MEDICAL HISTORY:  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     VSD (ventricular septal defect)        FAMILY HISTORY:  Family History   Problem Relation Age of Onset     Breast Cancer Mother         60s     Hypertension Mother      Alzheimer Disease Mother      Cerebrovascular Disease Mother      Hypertension Father      Cerebrovascular Disease Father      Atrial fibrillation Father      Lymphoma Father      Prostate Cancer Father      Alzheimer Disease Maternal Grandmother         likely     Arthritis Maternal Grandfather      Pneumonia Maternal Grandfather      Diabetes Paternal Grandmother        SOCIAL HISTORY:  Social History      Socioeconomic History     Marital status:      Spouse name: Romeo Michel     Number of children: 0   Occupational History     Employer: [employer]   Tobacco Use     Smoking status: Never     Smokeless tobacco: Never   Substance and Sexual Activity     Alcohol use: Not Currently     Comment: none     Drug use: No   Other Topics Concern     Parent/sibling w/ CABG, MI or angioplasty before 65F 55M? No     Social Determinants of Health     Intimate Partner Violence: Not At Risk     Fear of Current or Ex-Partner: No     Emotionally Abused: No     Physically Abused: No     Sexually Abused: No       CURRENT MEDICATIONS:  acetaminophen (TYLENOL) 500 MG tablet, Take 500 mg by mouth every 6 hours as needed for mild pain  alendronate (FOSAMAX) 70 MG tablet, TAKE 1 TABLET(70 MG) BY MOUTH EVERY 7 DAYS  apixaban ANTICOAGULANT (ELIQUIS ANTICOAGULANT) 5 MG tablet, Take 1 tablet (5 mg) by mouth 2 times daily  calcium carbonate 600 mg-vitamin D 400 units (CALTRATE) 600-400 MG-UNIT per tablet, Take 2 tablets by mouth daily  diltiazem ER COATED BEADS (CARDIZEM CD/CARTIA XT) 180 MG 24 hr capsule, Take 1 capsule (180 mg) by mouth daily  empagliflozin (JARDIANCE) 10 MG TABS tablet, Take 1 tablet (10 mg) by mouth daily for 360 days  gabapentin (NEURONTIN) 100 MG capsule, Take 1 capsule AM + 1 capsule afternoon + 2 capsules at bedtime  hydroxychloroquine (PLAQUENIL) 200 MG tablet, TAKE 1 TABLET(200 MG) BY MOUTH DAILY (Patient taking differently: Take 200 mg by mouth every evening)  loratadine (CLARITIN) 10 MG tablet, Take 10 mg by mouth daily  magnesium oxide 200 MG TABS, Take 200 mg by mouth daily bedtime  multivitamin, therapeutic with minerals (THERA-VIT-M) TABS tablet, Take 1 tablet by mouth daily  potassium chloride ER (KLOR-CON M) 20 MEQ CR tablet, Take 1 tablet (20 mEq) by mouth daily  predniSONE (DELTASONE) 1 MG tablet, Take 3 tablets (3 mg) by mouth daily  spironolactone (ALDACTONE) 50 MG tablet,  Take 1 tablet (50 mg) by mouth daily  STATIN NOT PRESCRIBED (INTENTIONAL), Please choose reason not prescribed, below (Patient not taking: Reported on 2/1/2023)  torsemide 40 MG TABS, Take 40 mg by mouth 2 times daily  Vitamin D (Cholecalciferol) 10 MCG (400 UNIT) TABS, Take 1 tablet by mouth daily    No current facility-administered medications on file prior to visit.      ROS:   Refer to HPI    EXAM:  There were no vitals taken for this visit.  GENERAL: Appears comfortable, in no acute distress.   HEENT: Eye symmetrical, no discharge or icterus bilaterally. Mucous membranes moist and without lesions.  CV: tachycardic, irregular, +S1S2, systolic murmur, no rub, or gallop. JVP below clavicular line  RESPIRATORY: Respirations regular, even, and unlabored. Lungs CTA throughout.   GI: Soft and non distended with normoactive bowel sounds present in all quadrants. No tenderness, rebound, guarding. No hepatomegaly.   EXTREMITIES: No peripheral edema. 2+ bilateral pedal pulses.   NEUROLOGIC: Alert and oriented x 3. No focal deficits.   MUSCULOSKELETAL: No joint swelling or tenderness.   SKIN: No jaundice. No rashes or lesions.     Labs, reviewed with patient in clinic today:  CBC RESULTS:  Lab Results   Component Value Date    WBC 4.6 01/30/2023    WBC Canceled, Test credited 03/16/2021    RBC 4.06 01/30/2023    RBC Canceled, Test credited 03/16/2021    HGB 14.8 01/30/2023    HGB Canceled, Test credited 03/16/2021    HCT 43.2 01/30/2023    HCT Canceled, Test credited 03/16/2021     (H) 01/30/2023    MCV Canceled, Test credited 03/16/2021    MCH 36.5 (H) 01/30/2023    MCH Canceled, Test credited 03/16/2021    MCHC 34.3 01/30/2023    MCHC Canceled, Test credited 03/16/2021    RDW 16.4 (H) 01/30/2023    RDW Canceled, Test credited 03/16/2021    PLT 84 (L) 01/30/2023    PLT Canceled, Test credited 03/16/2021       CMP RESULTS:  Lab Results   Component Value Date     01/30/2023     (L) 11/29/2022      06/23/2021    POTASSIUM 3.9 01/30/2023    POTASSIUM 4.7 11/19/2022    POTASSIUM 3.9 07/20/2022    POTASSIUM 4.2 06/23/2021    CHLORIDE 91 (L) 01/30/2023    CHLORIDE 101 07/20/2022    CHLORIDE 102 06/23/2021    CO2 30 (H) 01/30/2023    CO2 28 07/20/2022    CO2 28 06/23/2021    ANIONGAP 15 01/30/2023    ANIONGAP 7 07/20/2022    ANIONGAP 6 06/23/2021    GLC 70 01/30/2023    GLC 92 12/01/2022     (H) 07/20/2022    GLC 98 06/23/2021    BUN 28.5 (H) 01/30/2023    BUN 31 (H) 07/20/2022    BUN 25 06/23/2021    CR 1.25 (H) 01/30/2023    CR 1.05 (H) 06/23/2021    GFRESTIMATED 48 (L) 01/30/2023    GFRESTIMATED 57 (L) 06/23/2021    GFRESTBLACK 67 06/23/2021    VIKTORIYA 9.6 01/30/2023    VIKTORIYA 10.0 06/23/2021    BILITOTAL 2.0 (H) 12/21/2022    BILITOTAL 1.2 06/23/2021    ALBUMIN 4.3 12/21/2022    ALBUMIN 4.4 04/20/2022    ALBUMIN 4.2 06/23/2021    ALKPHOS 132 (H) 12/21/2022    ALKPHOS 136 06/23/2021    ALT 39 12/21/2022    ALT 41 06/23/2021    AST 65 (H) 12/21/2022    AST 36 06/23/2021        INR RESULTS:  Lab Results   Component Value Date    INR 4.57 (H) 11/19/2022    INR 3.42 (H) 03/07/2018       Lab Results   Component Value Date    MAG 2.1 01/30/2023    MAG 1.9 04/12/2018     Lab Results   Component Value Date    NTBNPI 2,067 (H) 01/24/2023    NTBNPI 485 12/12/2019     Lab Results   Component Value Date    NTBNP 1,425 (H) 10/13/2021    NTBNP 1,274 (H) 05/26/2021       Cardiac Diagnostics:    2/7/2023 Device Interrogation   Device: [employer] [id number] [place] XT DR GALLEGOS  Normal Device Function.  Mode: VVIR  bpm  : 8.6%  Presenting EGM: Narrow Complex tachycardia @ 139 bpm  Short V-V intervals: 0  Lead Trends Appear Stable.  Estimated battery longevity to RRT = 11 years. Battery voltage = 3.02 V.  Atrial Arrhythmia: AFL  Anticoagulant: Apixaban  Ventricular Arrhythmia: 4 episodes lasting 1-4 sec 194-245 bpm  Transmission discussed with Maria Esther Cutler NP. Orders to increase diltiazem from 180 mg daily to 240 mg  daily.    12/5/2022 ECHO  Interpretation Summary  Severe tricuspid insufficiency is present.  Moderate right ventricular dilation is present. Global right ventricular  function is mildly reduced.  Global and regional left ventricular function is normal with an EF of 55-60%.  IVC diameter >2.1 cm collapsing <50% with sniff suggests a high RA pressure  estimated at 15 mmHg or greater.  No pericardial effusion is present.  No significant changes noted.    11/28/2022 RHC with coronary angiogram     Right sided filling pressures are severely elevated.    Mild elevated pulmonary hypertension.    Left sided filling pressures are moderately elevated.    Reduced cardiac output level.    Hemodynamic data has been modified in Epic per physician review.    Prox LAD lesion is 30% stenosed.     Mild non-obstructive coronary artery disease        Assessment and Plan:   Ms. 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 She was recently admitted 11/19/-12/5/22 with ADHF and frequent NSVT, now s/p VT ablation. She presents to [facility] for hospital follow up.     Overall, appearing well. Based on 5-7 lb weight loss due to ongoing diuresis and episode of vomiting, and today's labs which are notable for Na 127 and Cr 1.46, Ms. Michel is dry. Instructed to hold torsemide x 3 doses. Would like to see her weight closer to 105-107 lbs. Encouraged to increase PO intake. Will resume torsemide on 2/10, followed by labs on 2/13. It is very possible that we may need to reduce torsemide to 20 mg BID. I did provide her with a Rx for metolazone 2.5 mg PRN. She understands that  she is to only take this when told to do so by her HF team.     Now that Ms. Michel is feeling better and fluid status improved, Ms. Michel tells me that she would like to speak with Dr. Cope regarding her TR and surgery. Will refer to Dr. Cope for clinic appointment.     Continuing with appeal for cardioMEMs.       # Acute on chronic diastolic heart failure with dilated and mildly reduced RV function (LVEF 55-60%)  Pre-Ablation: 11/28/22 RHC: CVP 24, PA 48/27/34, PCWP 23, SVO2 46, CI 1.1, CO 1.6  Post Ablation and Diuresis: 12/3/22 RHC: CVP 16, PA 41/22/28, PCWP 15, SVO2 69, CI 2.1, CO 3.0    NYHA Class III, AHA/ACC Stage C  Rate control: 127  volume status: hypovolemic, hold torsemide 40 mg BID x 3 doses, EDW ~ 107 lb  Blood pressure control: Diltiazem 240 mg daily   Aldosterone antagonist: Aldactone 50 mg po daily   SGLT2:  Empagliflozin 10 mg daily   Evaluation of coronary arteries: 11/28/22 angiogram mild non-obstructive CAD     # Frequent, non-sustained ventricular tachycardia s/p VT Ablation (by Dr. Eaton on 12/1/22)  - Anti-arrhythmic: none, s/p VT ablation  - Stopped PTA digoxin per EP recommendations  - Anticoagulation: given history of Afib, continue Apixaban 5 mg bid  - Follow up with EP specialists (Dr. Eaton and/or Maria Esther Cutler NP)    # HTN Intolerant to Metoprolol in the past.   - Diltiazem 240 mg daily      # Aflutter. History of SSS s/p PPM 12/19. Long standing history of atrial arrythmias.  - Diltiazem 240 mg daily  - Continue Eliquis.   - Follow up with EP as scheduled.      # VSD s/p repair.     # History of exudative pericardial effusion.   - Stable per CT 3/19.     # Severe TR per TTE 11/2022  Secondary to non coaptation of tricuspid valves. Seen on BRE from 11/22/22 and remained severe on repeat TTE on 12/5/22 despite achieving clinical euvolemia and presumed dry weight. Patient was evaluated by surgery during this admission (Dr. Cope) who thought she may be a candidate for surgical  intervention.   - CVTS referral         Follow up:  - Labs 2/10  - CORE in 1 month       Follow up with clarissa in clini c      Chart review time today: 10 minutes  Visit time today: 30 minutes  Total time spent today: 40 minutes        Elmira Vasquez DNP, NP-C  Advance Heart Failure  2/8/2023

## 2023-02-08 NOTE — PATIENT INSTRUCTIONS
CORE: Cardiomyopathy, Optimization, Rehabilitation, Education      Take your medicines every day, as directed    Changes/Recommendations made today:  Metolazone 2.5 mg PRN. Please call if you are having weight gain > 109 lb or worsening heart failure symptoms including shortness of breath, fluid retention        Monitor Your Weight and Symptoms    Contact us if you:    Gain 2 pounds in one day or 5 pounds in one week  Feel more short of breath  Notice more leg swelling  Feel lightheadeded   Change your lifestyle    Limit Salt or Sodium:  2000 mg  Limit Fluids:  2000 mL or approximately 64 ounces  Eat a Heart Healthy Diet  Low in saturated fats  Stay Active:  Aim to move at least 150 minutes every  week         To Contact us    During Business Hours:  [phone], option # 1      After hours, weekends or holidays:   [phone], Option #4  Ask to speak to the On-Call Cardiologist. Inform them you are a CORE/heart failure patient at the [facility].     Use [employer] allows you to communicate directly with your heart team through secure messaging.  [employer] can be accessed any time on your phone, computer, or tablet.  If you need assistance, we'd be happy to help!         Keep your Heart Appointments:    CORE in 1 month     Referral to Dr. Cope for evaluation of tricuspid valve replacement

## 2023-02-08 NOTE — NURSING NOTE
Chief Complaint   Patient presents with     Follow Up     Return CORE ; 62 year old with chronic diastolic heart failure presents for follow up with labs prior.           Vitals were taken and medications reconciled.     Reynold Shelley, EMT   3:08 PM

## 2023-02-08 NOTE — LETTER
2/8/2023      RE: Amelia Michel  [address]  [facility] [address]       Dear Colleague,    Thank you for the opportunity to participate in the care of your patient, Amelia Michel, at the [facility] HEART [facility]INIC [place] at [facility]. Please see a copy of my visit note below.      [facility] Cardiology   CORE Clinic      HPI:   Ms. 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 She was recently admitted 11/19/-12/5/22 with ADHF and frequent NSVT, now s/p VT ablation. She presents to [facility] for hospital follow up.     With regards to her cardiac conditions, as noted, Ms. Michel presented to [facility] on 11/19 with MONTALVO, BLE edema, and 8 pound weight gain over the course of one week. Chest X-ray was suspicious for pulmonary edema, NT-P BNP 1,913, Troponin T 21-->12. EDW per chart review 116-120; admission weight 128. She was admitted w/ decompensated diastolic heart failure with pictutre c/b frequent non sustained ventricular tachycardia with RHC on 11/28/22 notable for cardiogenic shock with a cardiac index of 1.2 and CO of 1.6. Etiology thought to most likely secondary to frequent non sustained ventricular tachycardia in the setting of possible restrictive/constrictive physiology given her malignancy history (albeit presumably in remission). CAD ruled on by coronary angiogram completed on 11/28/22. She underwent VT ablation on 12/1/22 and had successful resumption of her home medications with increased diuretic dosing and adddition of an SGLT2 inhibitor. She was diuresed 19 lbs to a  weight of 109 lbs at time of discharge.     At CORE visit on 1/9/23 Ms. Michel was hypervolemic as  evident by weight gain, tachycardia, and peripheral edema. Review of labs demonstrated persistent hyponatremia with Na 127 and bump in Cr to 1.35. Lasix was increase 60 mg BID. Still awaiting for PA for cardiomems device. Admitted 1/24-1/29 for CHF exacerbation. Diuresed and switched to torsemide 40 mg BID. Discharged at a weight of 107 lb.     Device interrogation from 2/7 with rates 120s-140, AFL. Diltiazem increased to 240 mg daily. Starting tomorrow.     Today, Ms. Michel [place] [facility] [place] . Since discharge she reports feeling significantly better. She continues to monitor her weights at home at they are currently ranging 100-102 lbs. Of note, Ms. Michel tells me that on 2/6 she became ill with several episodes of vomiting. She thinks it was due to something she ate. Attributes further weight loss to brief illness. Energy is improved. Has some MONTALVO but feels it proportional to the activity. Denies chest pain, SOB, palpitations, lightheadedness, orthopnea, PND, peripheral edema, abdominal distention, syncope, presyncope. Remains on eliquis for CVA prophylaxis. Denies any bleeding episodes. Home -105/85-89.       Cardiac Medications:   - Eliquis 5 mg BID  - Diltiazem 240 mg daily   - Jardiance 10 mg daily   - Torsemide 40 mg BID  - Spironolactone 50 mg daily       PAST MEDICAL HISTORY:  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     VSD (ventricular septal defect)        FAMILY HISTORY:  Family History   Problem Relation Age of Onset     Breast Cancer Mother         60s     Hypertension Mother      Alzheimer Disease Mother      Cerebrovascular Disease Mother       Hypertension Father      Cerebrovascular Disease Father      Atrial fibrillation Father      Lymphoma Father      Prostate Cancer Father      Alzheimer Disease Maternal Grandmother         likely     Arthritis Maternal Grandfather      Pneumonia Maternal Grandfather      Diabetes Paternal Grandmother        SOCIAL HISTORY:  Social History     Socioeconomic History     Marital status:      Spouse name: Romeo Michel     Number of children: 0   Occupational History     Employer: [employer]   Tobacco Use     Smoking status: Never     Smokeless tobacco: Never   Substance and Sexual Activity     Alcohol use: Not Currently     Comment: none     Drug use: No   Other Topics Concern     Parent/sibling w/ CABG, MI or angioplasty before 65F 55M? No     Social Determinants of Health     Intimate Partner Violence: Not At Risk     Fear of Current or Ex-Partner: No     Emotionally Abused: No     Physically Abused: No     Sexually Abused: No       CURRENT MEDICATIONS:  acetaminophen (TYLENOL) 500 MG tablet, Take 500 mg by mouth every 6 hours as needed for mild pain  alendronate (FOSAMAX) 70 MG tablet, TAKE 1 TABLET(70 MG) BY MOUTH EVERY 7 DAYS  apixaban ANTICOAGULANT (ELIQUIS ANTICOAGULANT) 5 MG tablet, Take 1 tablet (5 mg) by mouth 2 times daily  calcium carbonate 600 mg-vitamin D 400 units (CALTRATE) 600-400 MG-UNIT per tablet, Take 2 tablets by mouth daily  diltiazem ER COATED BEADS (CARDIZEM CD/CARTIA XT) 180 MG 24 hr capsule, Take 1 capsule (180 mg) by mouth daily  empagliflozin (JARDIANCE) 10 MG TABS tablet, Take 1 tablet (10 mg) by mouth daily for 360 days  gabapentin (NEURONTIN) 100 MG capsule, Take 1 capsule AM + 1 capsule afternoon + 2 capsules at bedtime  hydroxychloroquine (PLAQUENIL) 200 MG tablet, TAKE 1 TABLET(200 MG) BY MOUTH DAILY (Patient taking differently: Take 200 mg by mouth every evening)  loratadine (CLARITIN) 10 MG tablet, Take 10 mg by mouth daily  magnesium oxide 200 MG  TABS, Take 200 mg by mouth daily bedtime  multivitamin, therapeutic with minerals (THERA-VIT-M) TABS tablet, Take 1 tablet by mouth daily  potassium chloride ER (KLOR-CON M) 20 MEQ CR tablet, Take 1 tablet (20 mEq) by mouth daily  predniSONE (DELTASONE) 1 MG tablet, Take 3 tablets (3 mg) by mouth daily  spironolactone (ALDACTONE) 50 MG tablet, Take 1 tablet (50 mg) by mouth daily  STATIN NOT PRESCRIBED (INTENTIONAL), Please choose reason not prescribed, below (Patient not taking: Reported on 2/1/2023)  torsemide 40 MG TABS, Take 40 mg by mouth 2 times daily  Vitamin D (Cholecalciferol) 10 MCG (400 UNIT) TABS, Take 1 tablet by mouth daily    No current facility-administered medications on file prior to visit.      ROS:   Refer to HPI    EXAM:  There were no vitals taken for this visit.  GENERAL: Appears comfortable, in no acute distress.   HEENT: Eye symmetrical, no discharge or icterus bilaterally. Mucous membranes moist and without lesions.  CV: tachycardic, irregular, +S1S2, systolic murmur, no rub, or gallop. JVP below clavicular line  RESPIRATORY: Respirations regular, even, and unlabored. Lungs CTA throughout.   GI: Soft and non distended with normoactive bowel sounds present in all quadrants. No tenderness, rebound, guarding. No hepatomegaly.   EXTREMITIES: No peripheral edema. 2+ bilateral pedal pulses.   NEUROLOGIC: Alert and oriented x 3. No focal deficits.   MUSCULOSKELETAL: No joint swelling or tenderness.   SKIN: No jaundice. No rashes or lesions.     Labs, reviewed with patient in clinic today:  CBC RESULTS:  Lab Results   Component Value Date    WBC 4.6 01/30/2023    WBC Canceled, Test credited 03/16/2021    RBC 4.06 01/30/2023    RBC Canceled, Test credited 03/16/2021    HGB 14.8 01/30/2023    HGB Canceled, Test credited 03/16/2021    HCT 43.2 01/30/2023    HCT Canceled, Test credited 03/16/2021     (H) 01/30/2023    MCV Canceled, Test credited 03/16/2021    MCH 36.5 (H) 01/30/2023    MCH  Canceled, Test credited 03/16/2021    MCHC 34.3 01/30/2023    MCHC Canceled, Test credited 03/16/2021    RDW 16.4 (H) 01/30/2023    RDW Canceled, Test credited 03/16/2021    PLT 84 (L) 01/30/2023    PLT Canceled, Test credited 03/16/2021       CMP RESULTS:  Lab Results   Component Value Date     01/30/2023     (L) 11/29/2022     06/23/2021    POTASSIUM 3.9 01/30/2023    POTASSIUM 4.7 11/19/2022    POTASSIUM 3.9 07/20/2022    POTASSIUM 4.2 06/23/2021    CHLORIDE 91 (L) 01/30/2023    CHLORIDE 101 07/20/2022    CHLORIDE 102 06/23/2021    CO2 30 (H) 01/30/2023    CO2 28 07/20/2022    CO2 28 06/23/2021    ANIONGAP 15 01/30/2023    ANIONGAP 7 07/20/2022    ANIONGAP 6 06/23/2021    GLC 70 01/30/2023    GLC 92 12/01/2022     (H) 07/20/2022    GLC 98 06/23/2021    BUN 28.5 (H) 01/30/2023    BUN 31 (H) 07/20/2022    BUN 25 06/23/2021    CR 1.25 (H) 01/30/2023    CR 1.05 (H) 06/23/2021    GFRESTIMATED 48 (L) 01/30/2023    GFRESTIMATED 57 (L) 06/23/2021    GFRESTBLACK 67 06/23/2021    VIKTORIYA 9.6 01/30/2023    VIKTORIYA 10.0 06/23/2021    BILITOTAL 2.0 (H) 12/21/2022    BILITOTAL 1.2 06/23/2021    ALBUMIN 4.3 12/21/2022    ALBUMIN 4.4 04/20/2022    ALBUMIN 4.2 06/23/2021    ALKPHOS 132 (H) 12/21/2022    ALKPHOS 136 06/23/2021    ALT 39 12/21/2022    ALT 41 06/23/2021    AST 65 (H) 12/21/2022    AST 36 06/23/2021        INR RESULTS:  Lab Results   Component Value Date    INR 4.57 (H) 11/19/2022    INR 3.42 (H) 03/07/2018       Lab Results   Component Value Date    MAG 2.1 01/30/2023    MAG 1.9 04/12/2018     Lab Results   Component Value Date    NTBNPI 2,067 (H) 01/24/2023    NTBNPI 485 12/12/2019     Lab Results   Component Value Date    NTBNP 1,425 (H) 10/13/2021    NTBNP 1,274 (H) 05/26/2021       Cardiac Diagnostics:    2/7/2023 Device Interrogation   Device: Zoodles [id number] [place] XT DR MRI  Normal Device Function.  Mode: VVIR  bpm  : 8.6%  Presenting EGM: Narrow Complex tachycardia @ 139 bpm  Short  V-V intervals: 0  Lead Trends Appear Stable.  Estimated battery longevity to RRT = 11 years. Battery voltage = 3.02 V.  Atrial Arrhythmia: AFL  Anticoagulant: Apixaban  Ventricular Arrhythmia: 4 episodes lasting 1-4 sec 194-245 bpm  Transmission discussed with Maria Esther Cutler NP. Orders to increase diltiazem from 180 mg daily to 240 mg daily.    12/5/2022 ECHO  Interpretation Summary  Severe tricuspid insufficiency is present.  Moderate right ventricular dilation is present. Global right ventricular  function is mildly reduced.  Global and regional left ventricular function is normal with an EF of 55-60%.  IVC diameter >2.1 cm collapsing <50% with sniff suggests a high RA pressure  estimated at 15 mmHg or greater.  No pericardial effusion is present.  No significant changes noted.    11/28/2022 RHC with coronary angiogram     Right sided filling pressures are severely elevated.    Mild elevated pulmonary hypertension.    Left sided filling pressures are moderately elevated.    Reduced cardiac output level.    Hemodynamic data has been modified in Epic per physician review.    Prox LAD lesion is 30% stenosed.     Mild non-obstructive coronary artery disease        Assessment and Plan:   Ms. 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 She was recently admitted 11/19/-12/5/22 with ADHF and frequent NSVT, now s/p VT ablation. She presents to [facility] for hospital follow up.     Overall, appearing well. Based on 5-7 lb weight loss due to ongoing diuresis and episode of vomiting, and today's labs which are notable for Na 127 and Cr 1.46, Ms. Michel  is dry. Instructed to hold torsemide x 3 doses. Would like to see her weight closer to 105-107 lbs. Encouraged to increase PO intake. Will resume torsemide on 2/10, followed by labs on 2/13. It is very possible that we may need to reduce torsemide to 20 mg BID. I did provide her with a Rx for metolazone 2.5 mg PRN. She understands that she is to only take this when told to do so by her HF team.     Now that Ms. Michel is feeling better and fluid status improved, Ms. Michel tells me that she would like to speak with Dr. Cope regarding her TR and surgery. Will refer to Dr. Cope for clinic appointment.     Continuing with appeal for cardioMEMs.       # Acute on chronic diastolic heart failure with dilated and mildly reduced RV function (LVEF 55-60%)  Pre-Ablation: 11/28/22 RHC: CVP 24, PA 48/27/34, PCWP 23, SVO2 46, CI 1.1, CO 1.6  Post Ablation and Diuresis: 12/3/22 RHC: CVP 16, PA 41/22/28, PCWP 15, SVO2 69, CI 2.1, CO 3.0    NYHA Class III, AHA/ACC Stage C  Rate control: 127  volume status: hypovolemic, hold torsemide 40 mg BID x 3 doses, EDW ~ 107 lb  Blood pressure control: Diltiazem 240 mg daily   Aldosterone antagonist: Aldactone 50 mg po daily   SGLT2:  Empagliflozin 10 mg daily   Evaluation of coronary arteries: 11/28/22 angiogram mild non-obstructive CAD     # Frequent, non-sustained ventricular tachycardia s/p VT Ablation (by Dr. Eaton on 12/1/22)  - Anti-arrhythmic: none, s/p VT ablation  - Stopped PTA digoxin per EP recommendations  - Anticoagulation: given history of Afib, continue Apixaban 5 mg bid  - Follow up with EP specialists (Dr. Eaton and/or Maria Esther Cutler NP)    # HTN Intolerant to Metoprolol in the past.   - Diltiazem 240 mg daily      # Aflutter. History of SSS s/p PPM 12/19. Long standing history of atrial arrythmias.  - Diltiazem 240 mg daily  - Continue Eliquis.   - Follow up with EP as scheduled.      # VSD s/p repair.     # History of exudative pericardial effusion.   - Stable per CT  3/19.     # Severe TR per TTE 11/2022  Secondary to non coaptation of tricuspid valves. Seen on BRE from 11/22/22 and remained severe on repeat TTE on 12/5/22 despite achieving clinical euvolemia and presumed dry weight. Patient was evaluated by surgery during this admission (Dr. oCpe) who thought she may be a candidate for surgical intervention.   - CVTS referral         Follow up:  - Labs 2/10  - CORE in 1 month       Follow up with clarissa in clini c      Chart review time today: 10 minutes  Visit time today: 30 minutes  Total time spent today: 40 minutes        Elmira Vasquez DNP, NP-C  Advance Heart Failure  2/8/2023

## 2023-02-10 ENCOUNTER — OFFICE VISIT (OUTPATIENT)
Dept: ENDOCRINOLOGY | Facility: CLINIC | Age: 63
End: 2023-02-10
Payer: COMMERCIAL

## 2023-02-10 ENCOUNTER — TELEPHONE (OUTPATIENT)
Dept: ENDOCRINOLOGY | Facility: CLINIC | Age: 63
End: 2023-02-10

## 2023-02-10 ENCOUNTER — LAB (OUTPATIENT)
Dept: LAB | Facility: CLINIC | Age: 63
End: 2023-02-10
Payer: COMMERCIAL

## 2023-02-10 VITALS
WEIGHT: 104.8 LBS | DIASTOLIC BLOOD PRESSURE: 62 MMHG | SYSTOLIC BLOOD PRESSURE: 102 MMHG | BODY MASS INDEX: 22.61 KG/M2 | HEART RATE: 94 BPM | HEIGHT: 57 IN

## 2023-02-10 DIAGNOSIS — M06.9 RHEUMATOID ARTHRITIS OF BOTH ANKLES, UNSPECIFIED WHETHER RHEUMATOID FACTOR PRESENT (H): ICD-10-CM

## 2023-02-10 DIAGNOSIS — E83.52 HYPERCALCEMIA: ICD-10-CM

## 2023-02-10 DIAGNOSIS — N18.32 STAGE 3B CHRONIC KIDNEY DISEASE (H): ICD-10-CM

## 2023-02-10 DIAGNOSIS — M81.8 STEROID-INDUCED OSTEOPOROSIS: ICD-10-CM

## 2023-02-10 DIAGNOSIS — T38.0X5A STEROID-INDUCED OSTEOPOROSIS: ICD-10-CM

## 2023-02-10 DIAGNOSIS — E04.2 NONTOXIC MULTINODULAR GOITER: ICD-10-CM

## 2023-02-10 DIAGNOSIS — D47.2 MGUS (MONOCLONAL GAMMOPATHY OF UNKNOWN SIGNIFICANCE): ICD-10-CM

## 2023-02-10 DIAGNOSIS — C83.30 DIFFUSE LARGE B-CELL LYMPHOMA, UNSPECIFIED BODY REGION (H): ICD-10-CM

## 2023-02-10 DIAGNOSIS — E27.49 SECONDARY ADRENAL INSUFFICIENCY (H): ICD-10-CM

## 2023-02-10 DIAGNOSIS — Z79.52 ON PREDNISONE THERAPY: ICD-10-CM

## 2023-02-10 PROCEDURE — 99215 OFFICE O/P EST HI 40 MIN: CPT | Performed by: INTERNAL MEDICINE

## 2023-02-10 RX ORDER — PREDNISONE 5 MG/1
TABLET ORAL
Qty: 30 TABLET | Refills: 3 | Status: ON HOLD | OUTPATIENT
Start: 2023-02-10 | End: 2024-01-24

## 2023-02-10 RX ORDER — ALENDRONATE SODIUM 70 MG/1
70 TABLET ORAL
Qty: 12 TABLET | Refills: 3 | Status: SHIPPED | OUTPATIENT
Start: 2023-02-10 | End: 2024-04-22

## 2023-02-10 RX ORDER — DEXAMETHASONE SODIUM PHOSPHATE 4 MG/ML
INJECTION, SOLUTION INTRA-ARTICULAR; INTRALESIONAL; INTRAMUSCULAR; INTRAVENOUS; SOFT TISSUE
Qty: 2 ML | Refills: 3 | Status: ON HOLD | OUTPATIENT
Start: 2023-02-10 | End: 2024-01-24

## 2023-02-10 ASSESSMENT — ENCOUNTER SYMPTOMS
VOMITING: 1
PALPITATIONS: 1
SNORES LOUDLY: 0
PARALYSIS: 0
BLOATING: 0
POSTURAL DYSPNEA: 0
NUMBNESS: 1
MEMORY LOSS: 0
TINGLING: 1
CONSTIPATION: 0
STIFFNESS: 1
BLOOD IN STOOL: 0
PANIC: 0
LIGHT-HEADEDNESS: 0
SPEECH CHANGE: 1
EXERCISE INTOLERANCE: 1
INSOMNIA: 1
HYPOTENSION: 0
COUGH DISTURBING SLEEP: 0
RECTAL PAIN: 0
ORTHOPNEA: 0
DYSURIA: 0
COUGH: 0
MUSCLE CRAMPS: 1
DIZZINESS: 1
ABDOMINAL PAIN: 0
DIFFICULTY URINATING: 0
INCREASED ENERGY: 1
DIARRHEA: 0
HEMOPTYSIS: 0
TREMORS: 1
HEARTBURN: 0
JOINT SWELLING: 1
LEG PAIN: 1
FLANK PAIN: 0
SHORTNESS OF BREATH: 0
MUSCLE WEAKNESS: 1
SPUTUM PRODUCTION: 0
LOSS OF CONSCIOUSNESS: 0
WEAKNESS: 1
WHEEZING: 0
JAUNDICE: 0
SEIZURES: 0
SWOLLEN GLANDS: 0
SLEEP DISTURBANCES DUE TO BREATHING: 0
BRUISES/BLEEDS EASILY: 1
DEPRESSION: 1
DECREASED CONCENTRATION: 1
BACK PAIN: 0
NERVOUS/ANXIOUS: 0
HEADACHES: 0
HEMATURIA: 0
BOWEL INCONTINENCE: 0
ARTHRALGIAS: 1
SYNCOPE: 0
NAUSEA: 0
MYALGIAS: 1
NECK PAIN: 1
FATIGUE: 1
HYPERTENSION: 1
DYSPNEA ON EXERTION: 1
DISTURBANCES IN COORDINATION: 1

## 2023-02-10 NOTE — PROGRESS NOTES
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 The following is a comprehensive summary of her Endocrine care to date.      # Osteoporosis     We previously reviewed the osteoporosis dictation template.    DEXA 3/30/2021:  -lumbar spine non diagnostic  -lowest T-score -2.0 at the right femoral neck with an increase in the total mean hip BMD by 6.1% (significant) since 2018     Thoracic/lumbar spine XR 11/2/2021 negative for vertebral compression fracture.    CXR 1/2023: negative for compression fracture     No prior fractures. She has poor balance. No prior nephrolithiasis.     She has rheumatoid arthritis and has been on prednisone daily since her mid 30s, initially 3 mg daily and since 2017 she has been on prednisone 5 mg daily before reducing back to 3 mg daily. She had been on methotrexate previously but it was stopped remotely. Outside of chemotherapy for diffuse large B cell lymphoma in 2017 (she has never had radiation therapy, her DLBCL was treated only with chemotherapy in 2017), no high dose glucocorticoid exposure. No other GC exposure outside of prednisone. She has been on apixaban since 2017.       As of 2/2023 Amelia has completed a complete evaluation for secondary causes of increased fracture risk with the following notable findings:  -Has had several episodes of mild hypercalcemia over the years:      -2017: uncorrected serum calcium 10.2 mg/dL (ULN 10.1) with mildly low albumin, 10/2021: uncorrected serum calcium 10.2 (ULN 10.1 mg/dL) without an albumin, 4/2022: corrected serum calcium (albumin 4.4 g/dL) 10.8 mg/dl (ULN 10.1), 5/24/2022 uncorrected serum calcium 10.8 mg/dL with no albumin and all calcium values since this have been normal      -Both hypo and hyperphosphatemia at times  -Only 1 PTH assay to date: 11/2/2021: corrected serum calcium 10.1 (ULN 10.1 mg/dL) with uncorrected serum calcium 10.2 mg/dL and  albumin 4.1 g/dL, PTH 31 (ref range 18 - 80 pg/mL)  -CKD-3   -11/5/2021: 24 hour urine calcium undetectable (2.4 L)   -11/2/2021: bone specific alkaline phosphatase mid to upper pre-menopausal range, B-CTX in the lower premenopausal range    -No recent vitamin D  -MGUS present on testing 11/2022 (this was ordered by the inpatient team) and I did let her hematologist know of this interim finding     She believes she was on weekly Fosamax from ~ 2005 until 2017. However, this is in contrast to her notes in the medical record that state Fosamax for 6-7 years in the early 2000s. She then restarted Fosamax 70 mg once weekly in 1/2019 and took it until 4/2021. She may have been off of Fosamax for a few months in the spring of 2020. Fosamax has been well tolerated. No other prior pharmacologic therapy to reduce fracture risk.    11/12/2021: restarted Fosamax and she continues it to date (2/10/2023)     2/10/2023: She takes a calcium supplement daily, vitamin D light daily, a multivitamin daily, and has 1-3 servings of calcium daily.  No interim fractures or kidney stones.  Fosamax is well-tolerated and she takes it appropriately to maximize absorption and minimize the risk of esophagitis.  No upcoming/recent dental work.  She has noticed disorder in her mouth inferior and lateral to the tongue that looks like superficial ulceration.     No recent or upcoming dental extraction. No GERD. Complex cardiac history, see cardiology note 2/9/2023, and has mild non-obstructive CAD on angiogram 11/2022. CT imaging has shown atherosclerosis in the abdominal aorta. No prior ASCVD event. Jardiance was prescribed 12/2022 by cardiology given her CHF.      # Thyroid nodularity     CT chest 2019: multinodular thyroid gland with extension into the mediastinum, stable compared with 2015    Thyroid US completed 11/2/2021 (only thyroid US to date) reviewed in detail and I agree with the radiology interpretation. Thyroid gland is normal in  size. Multiple sub centimeter nodules with only 1 nodule >= 1 cm and this is a 1.8 cm in maximal dimension nodule in the isthmus that is solid and isoechoic without suspicious features and is TR-3.     No radiation therapy. No FH of thyroid pathology. No FH of thyroid cancer. No compressive symptoms (reviewed again 2/10/2023). No prior thyroid biopsy. No prior use of thyroid hormone. No history of abnormal thyroid function testing.    TSH 1.7 11/2021     # Dysglycemia     Has had HbA1c in the prediabetic range for a few years. Fasting glucose 126 mg/dL 10/13/2021.  mg/dL 10/15/2021. She has not previously been on medication to lower glucose before Jardiance. HbA1c 5.6% 6/2022. Some recent glucose values elevated but <200 mg/dL - unclear if any were fasting, most were probably during her hospitalizations.       Jardiance was prescribed 12/2022 by cardiology given her CHF.  Follow-up in this regard per her PCP.    Objective:    BMI 22.7 kg/m2. /62. Appears mildly cushingoid. Thyroid about 20 grams and nodular. No cervical LAD. Dentition in good repair, there is an area of soft tissue ulceration inferior and lateral to the tongue. Mild kyphosis.    Assessment/Plan:    # Osteoporosis, glucocorticoid associated     Amelia will continue Fosamax at this time, 70 mg once weekly.  She takes it appropriately to maximize absorption and minimize the risk of esophagitis.  I ordered labs to be done to include PTH with minerals, creatinine, vitamin D, 24-hour urine calcium.  We will repeat a DEXA over the summer and she will return to see me in the fall.    Given her extensive risk factors for fracture I favor likely continuing Fosamax beyond 5 years, GFR permitting.    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  She is mildly cushingoid on exam (likely from  prior supraphysiologic exposure).    I do think she would benefit from following sick day rules.    When sick she will take prednisone 10 mg daily for 3 days or until back to baseline.  In the event that she is unable to take oral prednisone she will inject dexamethasone 4 mg IM and this is prescribed and then proceed to the ER.  In the event of surgery/procedure she should receive IV hydrocortisone 100 mg on-call to the OR and the surgeon can contact me for specific dosing recommendations.  She will obtain a medical alert bracelet that states secondary adrenal insufficiency.    # Thyroid nodularity    I ordered a neck ultrasound that she will complete over the summer before our return visit and TSH.     55 minutes spent on the date of the encounter doing chart review, history and exam, documentation and further activities as noted above.

## 2023-02-10 NOTE — TELEPHONE ENCOUNTER
Spoke to pharmacy- it is for sick days 30 tablets is 15 day supply Per notes 10mg x3 days or until baseline. Patient would take 2 tablets daily until back to baseline when sick.

## 2023-02-10 NOTE — PROCEDURES
Inpatient Procedure Note    Procedure: Ultrasound guided central access, PA catheter placement, central line placement  Indication: Hemodynamic monitoring, central access  Diagnosis: Cardiogenic shock    After informing the benefits and risks, an informed consent was obtained. A time out and site verification were done prior to initiation of the procedure. The neck was prepped and draped in the usual sterile fashion and infiltrated with lidocaine under ultrasound guidance. A 7.5 Fr sheath was placed in the right internal jugular vein using modified Seldinger technique ultrasound guidance. A balloon-tipped [place]-Coby catheter was advanced into the right atrium, right ventricle, pulmonary artery and pulmonary capillary wedge position with guidance of pressure waveform. The balloon was deflated and the [place]-Coby catheter was left in the pulmonary artery. RA/RV/PA/PCW pressures were obtained. [place] locked at 55 cm. At time of procedure completion, all the ports aspirated and flushed properly. The patient tolerated the procedure well without any immediate complications.     Post-procedure x-ray shows the tip of the catheter within the PA.    Complications: None   Blood loss: Minimal blood loss    [place] Numbers pending    Rudy Hebert MD was present for the whole procedure.    Marcos Garces MD, PhD  Cardiology Fellow  Pager: [phone]       Patient notified.  Patient verbalized understanding of the information given.  No further questions at this time    Electronically Signed by:    Jenni Rogers CMA , 2/10/2023

## 2023-02-10 NOTE — TELEPHONE ENCOUNTER
[facility] Call Center    Phone Message    May a detailed message be left on voicemail: yes     Reason for Call: Medication Question or concern regarding medication   Prescription Clarification  Name of Medication:predniSONE (DELTASONE) 5 MG tablet      Prescribing Provider: Dr. May      Pharmacy: [id number] [place], [place] - [address]       What on the order needs clarification? For insurance purposes pharmacy needs to know the  Duration how long  30 tables is  supposed to last. A note can be faxed to pharmacy with this information asap, or call to follow up . Thank you .           Action Taken: Other: ENDO    Travel Screening: Not Applicable

## 2023-02-13 ENCOUNTER — HOSPITAL ENCOUNTER (OUTPATIENT)
Dept: PHYSICAL THERAPY | Facility: REHABILITATION | Age: 63
Discharge: HOME | End: 2023-02-13
Payer: COMMERCIAL

## 2023-02-13 ENCOUNTER — LAB (OUTPATIENT)
Dept: LAB | Facility: CLINIC | Age: 63
End: 2023-02-13
Payer: COMMERCIAL

## 2023-02-13 DIAGNOSIS — E04.2 NONTOXIC MULTINODULAR GOITER: ICD-10-CM

## 2023-02-13 DIAGNOSIS — I50.9 CHRONIC CONGESTIVE HEART FAILURE, UNSPECIFIED HEART FAILURE TYPE (H): ICD-10-CM

## 2023-02-13 DIAGNOSIS — I50.9 CONGESTIVE HEART FAILURE, UNSPECIFIED HF CHRONICITY, UNSPECIFIED HEART FAILURE TYPE (H): ICD-10-CM

## 2023-02-13 DIAGNOSIS — I50.33 ACUTE ON CHRONIC DIASTOLIC HEART FAILURE (H): ICD-10-CM

## 2023-02-13 DIAGNOSIS — E27.49 SECONDARY ADRENAL INSUFFICIENCY (H): ICD-10-CM

## 2023-02-13 DIAGNOSIS — M62.81 GENERALIZED MUSCLE WEAKNESS: Primary | ICD-10-CM

## 2023-02-13 DIAGNOSIS — M62.81 MUSCLE WEAKNESS (GENERALIZED): ICD-10-CM

## 2023-02-13 DIAGNOSIS — T38.0X5A STEROID-INDUCED OSTEOPOROSIS: ICD-10-CM

## 2023-02-13 DIAGNOSIS — N18.30 STAGE 3 CHRONIC KIDNEY DISEASE, UNSPECIFIED WHETHER STAGE 3A OR 3B CKD (H): ICD-10-CM

## 2023-02-13 DIAGNOSIS — M25.511 ACUTE PAIN OF RIGHT SHOULDER: ICD-10-CM

## 2023-02-13 DIAGNOSIS — I50.33 ACUTE ON CHRONIC DIASTOLIC CONGESTIVE HEART FAILURE (H): ICD-10-CM

## 2023-02-13 DIAGNOSIS — M81.8 STEROID-INDUCED OSTEOPOROSIS: ICD-10-CM

## 2023-02-13 LAB
ALBUMIN SERPL BCG-MCNC: 4.7 G/DL (ref 3.5–5.2)
ANION GAP SERPL CALCULATED.3IONS-SCNC: 16 MMOL/L (ref 7–15)
BUN SERPL-MCNC: 45.1 MG/DL (ref 8–23)
CALCIUM SERPL-MCNC: 10 MG/DL (ref 8.8–10.2)
CALCIUM SERPL-MCNC: 10 MG/DL (ref 8.8–10.2)
CHLORIDE SERPL-SCNC: 87 MMOL/L (ref 98–107)
CREAT SERPL-MCNC: 1.42 MG/DL (ref 0.51–1.17)
DEPRECATED HCO3 PLAS-SCNC: 23 MMOL/L (ref 22–29)
ERYTHROCYTE [DISTWIDTH] IN BLOOD BY AUTOMATED COUNT: 14.2 % (ref 10–15)
GFR SERPL CREATININE-BSD FRML MDRD: 42 ML/MIN/1.73M2
GLUCOSE SERPL-MCNC: 107 MG/DL (ref 70–99)
HCT VFR BLD AUTO: 44.6 % (ref 35–53)
HGB BLD-MCNC: 15.5 G/DL (ref 11.7–17.7)
MCH RBC QN AUTO: 36 PG (ref 26.5–33)
MCHC RBC AUTO-ENTMCNC: 34.8 G/DL (ref 31.5–36.5)
MCV RBC AUTO: 104 FL (ref 78–100)
PHOSPHATE SERPL-MCNC: 4.1 MG/DL (ref 2.5–4.5)
PLATELET # BLD AUTO: 140 10E3/UL (ref 150–450)
POTASSIUM SERPL-SCNC: 4.7 MMOL/L (ref 3.4–5.3)
PTH-INTACT SERPL-MCNC: 110 PG/ML (ref 15–65)
RBC # BLD AUTO: 4.3 10E6/UL (ref 3.8–5.9)
SODIUM SERPL-SCNC: 126 MMOL/L (ref 136–145)
TSH SERPL DL<=0.005 MIU/L-ACNC: 3.43 UIU/ML (ref 0.3–4.2)
WBC # BLD AUTO: 6.7 10E3/UL (ref 4–11)

## 2023-02-13 PROCEDURE — 84443 ASSAY THYROID STIM HORMONE: CPT

## 2023-02-13 PROCEDURE — 36415 COLL VENOUS BLD VENIPUNCTURE: CPT

## 2023-02-13 PROCEDURE — 80069 RENAL FUNCTION PANEL: CPT

## 2023-02-13 PROCEDURE — 82306 VITAMIN D 25 HYDROXY: CPT

## 2023-02-13 PROCEDURE — 83970 ASSAY OF PARATHORMONE: CPT

## 2023-02-13 PROCEDURE — 97110 THERAPEUTIC EXERCISES: CPT | Mod: GP | Performed by: PHYSICAL THERAPY ASSISTANT

## 2023-02-13 PROCEDURE — 97140 MANUAL THERAPY 1/> REGIONS: CPT | Mod: GP | Performed by: PHYSICAL THERAPY ASSISTANT

## 2023-02-13 PROCEDURE — 85027 COMPLETE CBC AUTOMATED: CPT

## 2023-02-14 ENCOUNTER — PATIENT OUTREACH (OUTPATIENT)
Dept: CARDIOLOGY | Facility: CLINIC | Age: 63
End: 2023-02-14
Payer: COMMERCIAL

## 2023-02-14 DIAGNOSIS — I50.22 CHRONIC SYSTOLIC HEART FAILURE (H): ICD-10-CM

## 2023-02-14 DIAGNOSIS — I50.33 ACUTE ON CHRONIC DIASTOLIC HEART FAILURE (H): Primary | ICD-10-CM

## 2023-02-14 DIAGNOSIS — I50.32 CHRONIC DIASTOLIC HEART FAILURE (H): ICD-10-CM

## 2023-02-14 DIAGNOSIS — I50.9 CHRONIC CONGESTIVE HEART FAILURE, UNSPECIFIED HEART FAILURE TYPE (H): ICD-10-CM

## 2023-02-14 RX ORDER — SPIRONOLACTONE 25 MG/1
25 TABLET ORAL DAILY
Qty: 30 TABLET | Refills: 3 | Status: SHIPPED | OUTPATIENT
Start: 2023-02-14 | End: 2023-02-27

## 2023-02-14 NOTE — TELEPHONE ENCOUNTER
Date: 2/14/2023    Time of Call: 8:56 AM     Diagnosis:  Heart failure     [ VORB ] Ordering provider: Elmira Vasquez NP    Order: Hold torsemide, decrease spironolactone to 25 mg daily, BMP friday     Order received by: Kandy Molina RN       Follow-up/additional notes: Reviewed with Amelia who stated understanding, she will schedule her own labs.  Rescheduled per her request the C[facility] appointment so it was closer to the requested time frame.

## 2023-02-14 NOTE — TELEPHONE ENCOUNTER
" metolazone (ZAROXOLYN) 2.5 MG tablet Take 1 tablet (2.5 mg) by mouth as needed (ONLY take if instructed to do so by your cardiology team.)   Last Written Prescription Date:  2/8/23  Last Fill Quantity: 5,   # refills: 0  Last Office Visit : 2/8/23  Future Office visit:  3/6/23    Routing refill request to provider for review/approval because:  See note  Take 1 tablet (2.5 mg) by mouth as needed (ONLY take if instructed to do so by your cardiology team.)   Last written for 5 pills. Is refill/ change in amount dispensed reasonable? Pharmacy is requesting:   Last note 2/8/22\" I did provide her with a Rx for metolazone 2.5 mg PRN. She understands that she is to only take this when told to do so by her HF team\"        "

## 2023-02-15 LAB
CALCIUM 24H UR-MRATE: 0.15 G/SPEC (ref 0.1–0.3)
CALCIUM UR-MCNC: 9.5 MG/DL
COLLECT DURATION TIME UR: 24 H
DEPRECATED CALCIDIOL+CALCIFEROL SERPL-MC: <64 UG/L (ref 20–75)
SPECIMEN VOL UR: 1575 ML
VITAMIN D2 SERPL-MCNC: <5 UG/L
VITAMIN D3 SERPL-MCNC: 59 UG/L

## 2023-02-15 PROCEDURE — 82340 ASSAY OF CALCIUM IN URINE: CPT

## 2023-02-15 PROCEDURE — 81050 URINALYSIS VOLUME MEASURE: CPT

## 2023-02-15 RX ORDER — METOLAZONE 2.5 MG/1
2.5 TABLET ORAL PRN
Qty: 5 TABLET | Refills: 0 | OUTPATIENT
Start: 2023-02-15

## 2023-02-15 NOTE — TELEPHONE ENCOUNTER
Bree stated that she does not need metolazone refilled at this time.  She has pills at home still.

## 2023-02-16 ENCOUNTER — OFFICE VISIT (OUTPATIENT)
Dept: AUDIOLOGY | Facility: CLINIC | Age: 63
End: 2023-02-16
Attending: PHYSICIAN ASSISTANT
Payer: COMMERCIAL

## 2023-02-16 DIAGNOSIS — H90.42 SENSORINEURAL HEARING LOSS (SNHL) OF LEFT EAR WITH UNRESTRICTED HEARING OF RIGHT EAR: Primary | ICD-10-CM

## 2023-02-16 DIAGNOSIS — H90.3 BILATERAL SENSORINEURAL HEARING LOSS: ICD-10-CM

## 2023-02-16 PROCEDURE — 92557 COMPREHENSIVE HEARING TEST: CPT | Performed by: AUDIOLOGIST

## 2023-02-16 PROCEDURE — 92567 TYMPANOMETRY: CPT | Performed by: AUDIOLOGIST

## 2023-02-16 NOTE — PROGRESS NOTES
AUDIOLOGY REPORT    SUBJECTIVE:  Amelia Michel is a 62 year old adult who was seen in the Audiology Clinic at the [facility] for audiologic evaluation, referred by Gala Stringer PA-C. Patient reports increased difficulty understanding people, especially when wearing a mask and if they are talking from a different room. She reports lightheadedness upon standing or when bending over to tie her shoes and standing back up; she attributes this to her blood pressure. She reports feeling off balance ever since her cardiac arrest in 2017. She has some noise exposure attending loud concerts in her 20 s. She denies otalgia, otorrhea, aural fullness, ear surgery, family history of childhood hearing loss, and prior use of amplification.    OBJECTIVE:  Abuse Screening:  Do you feel unsafe at home or work/school? No  Do you feel threatened by someone? No  Does anyone try to keep you from having contact with others, or doing things outside of your home? No  Physical signs of abuse present? No     Fall Risk Screen:  1. Have you fallen two or more times in the past year? No  2. Have you fallen and had an injury in the past year? No    Timed Up and Go Score (in seconds): not tested  Fall Risk Assessment Not Completed by Audiology    Otoscopic exam indicates ears are clear of cerumen bilaterally, some redness on the left tympanic membrane    Pure Tone Thresholds assessed using conventional audiometry with good  reliability from 250-8000 Hz bilaterally using insert earphones and circumaural headphones     RIGHT:  Normal hearing 250-8000 Hz    LEFT:    Normal hearing 250-4000 Hz sloping to mild presumably sensorineural hearing loss from 6-8 kHz    Tympanogram:    RIGHT: normal eardrum mobility    LEFT:   normal eardrum mobility    Reflexes (reported by stimulus ear):  Could not test due to equipment limitations    Speech Reception Threshold:    RIGHT: 20 dB HL    LEFT:   15 dB HL  Word Recognition Score:      RIGHT: 92% at 60 dB HL using NU-6 recorded word list.    LEFT:   100% at 55 dB HL using NU-6 recorded word list.      ASSESSMENT:     Pure tone audiometry showed normal hearing in the right ear and normal sloping to mild presumably sensorineural hearing loss from 6-8 kHz in the left ear. Tympanometry showed normal eardrum mobility, bilaterally. Today s results were discussed with the patient in detail.     PLAN:  It is recommended that the patient follow up with audiology in 1 year for repeat audiogram to monitor hearing, sooner if problems arise.  Please call this clinic with questions regarding these results or recommendations.    Theron Estrada, CCC-A  [place] Licensed Audiologist #[id number]

## 2023-02-17 ENCOUNTER — TELEPHONE (OUTPATIENT)
Dept: CARDIOLOGY | Facility: CLINIC | Age: 63
End: 2023-02-17

## 2023-02-17 ENCOUNTER — LAB (OUTPATIENT)
Dept: LAB | Facility: CLINIC | Age: 63
End: 2023-02-17
Payer: COMMERCIAL

## 2023-02-17 DIAGNOSIS — I50.33 ACUTE ON CHRONIC DIASTOLIC HEART FAILURE (H): ICD-10-CM

## 2023-02-17 DIAGNOSIS — I50.9 CHRONIC CONGESTIVE HEART FAILURE, UNSPECIFIED HEART FAILURE TYPE (H): Primary | ICD-10-CM

## 2023-02-17 LAB
ANION GAP SERPL CALCULATED.3IONS-SCNC: 9 MMOL/L (ref 7–15)
BUN SERPL-MCNC: 39.1 MG/DL (ref 8–23)
CALCIUM SERPL-MCNC: 9.7 MG/DL (ref 8.8–10.2)
CHLORIDE SERPL-SCNC: 90 MMOL/L (ref 98–107)
CREAT SERPL-MCNC: 1.19 MG/DL (ref 0.51–1.17)
DEPRECATED HCO3 PLAS-SCNC: 22 MMOL/L (ref 22–29)
GFR SERPL CREATININE-BSD FRML MDRD: 51 ML/MIN/1.73M2
GLUCOSE SERPL-MCNC: 107 MG/DL (ref 70–99)
POTASSIUM SERPL-SCNC: 6.2 MMOL/L (ref 3.4–5.3)
SODIUM SERPL-SCNC: 121 MMOL/L (ref 136–145)

## 2023-02-17 PROCEDURE — 80048 BASIC METABOLIC PNL TOTAL CA: CPT

## 2023-02-17 PROCEDURE — 36415 COLL VENOUS BLD VENIPUNCTURE: CPT

## 2023-02-17 RX ORDER — TORSEMIDE 20 MG/1
20 TABLET ORAL 2 TIMES DAILY
Qty: 180 TABLET | Refills: 3 | Status: ON HOLD | OUTPATIENT
Start: 2023-02-17 | End: 2023-04-17

## 2023-02-17 NOTE — TELEPHONE ENCOUNTER
Bree states that her weight has increased from 102 to 105 lb.  Her feet are starting to get puffy and she is more winded- so activities take longer.  Lab results are pending.  She would really like to be on something so she does not have to go back in to the hospital.      Currently holding torsemide and taking spirolactone 25 daily    Date: 2/17/2023    Time of Call: 1:37 PM     Diagnosis:  Heart failure     [ VORB ] Ordering provider: Elmira Vasquez NP    Order: restart torsemide at 20 mg BID     Order received by: Kandy Molina RN       Follow-up/additional notes: reviewed with Bree

## 2023-02-18 NOTE — TELEPHONE ENCOUNTER
Notified by lab that patient had a critical potassium level of 6.2.  Appears patient called nurse line earlier today to report increased weight from 102 pounds 205 pounds.  She was directed to increase the dose of her torsemide to 20 mg twice daily.  Patient also reports that she was directed to decrease the dose of her spironolactone from 50 mg daily to 25 mg daily on 2/14.  She also takes potassium chloride 20 mEq daily.    I instructed her to stop taking potassium supplement and also hold her spironolactone for the next 2 days (Saturday and Sunday).  She will take increased dose of torsemide 20 mg twice daily as originally planned.  We will arrange for repeat BMP on Monday, 2/20/2023.  Will forward message to ordering provider for follow-up as well.    Edmund Magana MD  Cardiology Fellow

## 2023-02-20 ENCOUNTER — HOSPITAL ENCOUNTER (OUTPATIENT)
Dept: PHYSICAL THERAPY | Facility: REHABILITATION | Age: 63
Discharge: HOME | End: 2023-02-20
Payer: COMMERCIAL

## 2023-02-20 ENCOUNTER — LAB (OUTPATIENT)
Dept: LAB | Facility: CLINIC | Age: 63
End: 2023-02-20
Payer: COMMERCIAL

## 2023-02-20 DIAGNOSIS — I50.9 CHRONIC CONGESTIVE HEART FAILURE, UNSPECIFIED HEART FAILURE TYPE (H): ICD-10-CM

## 2023-02-20 DIAGNOSIS — M62.81 MUSCLE WEAKNESS (GENERALIZED): ICD-10-CM

## 2023-02-20 DIAGNOSIS — M25.511 ACUTE PAIN OF RIGHT SHOULDER: ICD-10-CM

## 2023-02-20 DIAGNOSIS — M62.81 GENERALIZED MUSCLE WEAKNESS: Primary | ICD-10-CM

## 2023-02-20 LAB
ANION GAP SERPL CALCULATED.3IONS-SCNC: 12 MMOL/L (ref 7–15)
BUN SERPL-MCNC: 39.3 MG/DL (ref 8–23)
CALCIUM SERPL-MCNC: 10.1 MG/DL (ref 8.8–10.2)
CHLORIDE SERPL-SCNC: 88 MMOL/L (ref 98–107)
CREAT SERPL-MCNC: 1.17 MG/DL (ref 0.51–1.17)
DEPRECATED HCO3 PLAS-SCNC: 26 MMOL/L (ref 22–29)
GFR SERPL CREATININE-BSD FRML MDRD: 53 ML/MIN/1.73M2
GLUCOSE SERPL-MCNC: 99 MG/DL (ref 70–99)
POTASSIUM SERPL-SCNC: 4.6 MMOL/L (ref 3.4–5.3)
SODIUM SERPL-SCNC: 126 MMOL/L (ref 136–145)

## 2023-02-20 PROCEDURE — 36415 COLL VENOUS BLD VENIPUNCTURE: CPT

## 2023-02-20 PROCEDURE — 80048 BASIC METABOLIC PNL TOTAL CA: CPT

## 2023-02-20 PROCEDURE — 97140 MANUAL THERAPY 1/> REGIONS: CPT | Mod: GP | Performed by: PHYSICAL THERAPY ASSISTANT

## 2023-02-20 PROCEDURE — 97110 THERAPEUTIC EXERCISES: CPT | Mod: GP | Performed by: PHYSICAL THERAPY ASSISTANT

## 2023-02-20 NOTE — TELEPHONE ENCOUNTER
Bree is feeling better on torsemide, her weight is stable at 103 (was 105 on Friday).  Belly feels softer, feet feel better over all.  Shortness of breath and energy have improved.  She is finding it easier to do activities at this time.      Reviewed that we are still waiting on her labs and that if they continue to be out of normal, the recommendation may be for admission.  She stated understanding.      She has continued to hold spironolactone and potassium as instructed over the weekend

## 2023-02-21 ENCOUNTER — TELEPHONE (OUTPATIENT)
Dept: CARDIOLOGY | Facility: CLINIC | Age: 63
End: 2023-02-21

## 2023-02-21 NOTE — TELEPHONE ENCOUNTER
Reviewed labs with Elmira Vasquez NP and Bree.     Date: 2/21/2023    Time of Call: 10:14 AM     Diagnosis:  Heart failure     [ VORB ] Ordering provider: Elmira Vasquez NP    Order: BMP on Friday. If K and Crt are stable at that time, will restart sprionlactone 12.5 daily, no potassium      Order received by: Kandy Molina RN       Follow-up/additional notes: Bree read back instructions

## 2023-02-21 NOTE — TELEPHONE ENCOUNTER
[facility] Call Center    Phone Message    May a detailed message be left on voicemail: yes     Reason for Call: Other: Patient called to reschedule her NEW CV SURGERY [[id number]] appt. Please call patient back to further coordinate, thank you.     Action Taken: Message routed to:  Other: Cardiology    Travel Screening: Not Applicable     Thank you!  [facility]

## 2023-02-22 DIAGNOSIS — I50.9 CONGESTIVE HEART FAILURE, UNSPECIFIED HF CHRONICITY, UNSPECIFIED HEART FAILURE TYPE (H): ICD-10-CM

## 2023-02-22 RX ORDER — METOLAZONE 2.5 MG/1
2.5 TABLET ORAL PRN
Qty: 5 TABLET | Refills: 0 | Status: CANCELLED | OUTPATIENT
Start: 2023-02-22

## 2023-02-22 NOTE — TELEPHONE ENCOUNTER
Pharmacy was called and they have delegated the request for now. See note on 2- - that patient does not need at this time.

## 2023-02-24 ENCOUNTER — LAB (OUTPATIENT)
Dept: LAB | Facility: CLINIC | Age: 63
End: 2023-02-24
Payer: COMMERCIAL

## 2023-02-24 DIAGNOSIS — I50.22 CHRONIC SYSTOLIC HEART FAILURE (H): ICD-10-CM

## 2023-02-24 LAB
ANION GAP SERPL CALCULATED.3IONS-SCNC: 12 MMOL/L (ref 7–15)
BUN SERPL-MCNC: 40.9 MG/DL (ref 8–23)
CALCIUM SERPL-MCNC: 10.1 MG/DL (ref 8.8–10.2)
CHLORIDE SERPL-SCNC: 84 MMOL/L (ref 98–107)
CREAT SERPL-MCNC: 1.1 MG/DL (ref 0.51–1.17)
DEPRECATED HCO3 PLAS-SCNC: 30 MMOL/L (ref 22–29)
GFR SERPL CREATININE-BSD FRML MDRD: 57 ML/MIN/1.73M2
GLUCOSE SERPL-MCNC: 116 MG/DL (ref 70–99)
POTASSIUM SERPL-SCNC: 3.7 MMOL/L (ref 3.4–5.3)
SODIUM SERPL-SCNC: 126 MMOL/L (ref 136–145)

## 2023-02-24 PROCEDURE — 80048 BASIC METABOLIC PNL TOTAL CA: CPT

## 2023-02-24 PROCEDURE — 36415 COLL VENOUS BLD VENIPUNCTURE: CPT

## 2023-02-25 DIAGNOSIS — I50.32 CHRONIC DIASTOLIC HEART FAILURE (H): Primary | ICD-10-CM

## 2023-02-27 ENCOUNTER — HOSPITAL ENCOUNTER (OUTPATIENT)
Dept: PHYSICAL THERAPY | Facility: REHABILITATION | Age: 63
Discharge: HOME | End: 2023-02-27
Payer: COMMERCIAL

## 2023-02-27 DIAGNOSIS — M62.81 MUSCLE WEAKNESS (GENERALIZED): ICD-10-CM

## 2023-02-27 DIAGNOSIS — M25.511 ACUTE PAIN OF RIGHT SHOULDER: ICD-10-CM

## 2023-02-27 DIAGNOSIS — M62.81 GENERALIZED MUSCLE WEAKNESS: Primary | ICD-10-CM

## 2023-02-27 PROCEDURE — 97110 THERAPEUTIC EXERCISES: CPT | Mod: GP | Performed by: PHYSICAL THERAPY ASSISTANT

## 2023-02-27 PROCEDURE — 97140 MANUAL THERAPY 1/> REGIONS: CPT | Mod: GP | Performed by: PHYSICAL THERAPY ASSISTANT

## 2023-02-27 RX ORDER — SPIRONOLACTONE 25 MG/1
12.5 TABLET ORAL DAILY
Qty: 45 TABLET | Refills: 1 | Status: SHIPPED | OUTPATIENT
Start: 2023-02-27 | End: 2023-08-08

## 2023-02-27 NOTE — TELEPHONE ENCOUNTER
"Labs were checked last Friday.  Na back to baseline and K back to normal.  She \"fell off my diet\" this weekend and her feet are a little puffy but not bad- she has stockings on today.  Weight is stable around 103-104.  Shortness of breath and energy are about the same.  She would like to consider restarting spironolactone at every other day and see what her labs are on Monday at [facility]     Date: 2/27/2023    Time of Call: 3:01 PM     Diagnosis:  Heart failure     [ VORB ] Ordering provider: Elmira Vasquez NP    Order: spironolactone 12.5 mg daily, no potassium, labs next week     Order received by: Kandy Molina RN       Follow-up/additional notes: reviewed via phone call with Amelia"

## 2023-03-02 ENCOUNTER — OFFICE VISIT (OUTPATIENT)
Dept: CARDIOLOGY | Facility: CLINIC | Age: 63
End: 2023-03-02
Attending: NURSE PRACTITIONER
Payer: COMMERCIAL

## 2023-03-02 VITALS
OXYGEN SATURATION: 97 % | BODY MASS INDEX: 23.23 KG/M2 | DIASTOLIC BLOOD PRESSURE: 76 MMHG | WEIGHT: 107.7 LBS | HEART RATE: 79 BPM | SYSTOLIC BLOOD PRESSURE: 113 MMHG | HEIGHT: 57 IN

## 2023-03-02 DIAGNOSIS — M05.79 RHEUMATOID ARTHRITIS INVOLVING MULTIPLE SITES WITH POSITIVE RHEUMATOID FACTOR (H): ICD-10-CM

## 2023-03-02 DIAGNOSIS — I07.1 TRICUSPID VALVE INSUFFICIENCY, UNSPECIFIED ETIOLOGY: ICD-10-CM

## 2023-03-02 PROCEDURE — G0463 HOSPITAL OUTPT CLINIC VISIT: HCPCS | Performed by: THORACIC SURGERY (CARDIOTHORACIC VASCULAR SURGERY)

## 2023-03-02 PROCEDURE — 99213 OFFICE O/P EST LOW 20 MIN: CPT | Performed by: THORACIC SURGERY (CARDIOTHORACIC VASCULAR SURGERY)

## 2023-03-02 RX ORDER — PREDNISONE 1 MG/1
TABLET ORAL
Qty: 270 TABLET | Refills: 1 | Status: SHIPPED | OUTPATIENT
Start: 2023-03-02 | End: 2023-09-21

## 2023-03-02 ASSESSMENT — PAIN SCALES - GENERAL: PAINLEVEL: NO PAIN (0)

## 2023-03-02 NOTE — TELEPHONE ENCOUNTER
predniSONE (DELTASONE) 1 MG tablet       Last Written Prescription Date:  03-  Last Fill Quantity: 270,   # refills: 3  Last Office Visit : 09-  Future Office visit:  09-

## 2023-03-02 NOTE — LETTER
"3/2/2023      RE: Amelia Michel  [address]  [place] [address]       Dear Colleague,    Thank you for the opportunity to participate in the care of your patient, Amelia Michel, at the [facility] [place] [facility]. Please see a copy of my visit note below.    CVTS Followup  Ms. Michel is known to me from inpatient hospital consult.   She was admitted for heart failure exaccerbation.  Currently she feels she is doing well.    /76 (BP Location: Right arm, Patient Position: Chair, Cuff Size: Adult Regular)   Pulse 79   Ht 1.453 m (4' 9.21\")   Wt 48.9 kg (107 lb 11.2 oz)   SpO2 97%   BMI 23.14 kg/m      In chair, conversant, comfortable.  CTAB, RRR  Abd soft, not tender, not distended  Motor, sensation, pulses intact    A/P: 62yoF with severe tricuspid insufficiency  - Plan for minimally invasive tricuspid valve repair / replacement  - Please call with questions    Chilango Cope  Cardiothoracic surgery  [phone]    Please do not hesitate to contact me if you have any questions/concerns.     Sincerely,     Chilango Cope MD    "

## 2023-03-03 ENCOUNTER — PREP FOR PROCEDURE (OUTPATIENT)
Dept: CARDIOLOGY | Facility: CLINIC | Age: 63
End: 2023-03-03

## 2023-03-03 DIAGNOSIS — I07.1 TRICUSPID REGURGITATION: Primary | ICD-10-CM

## 2023-03-05 LAB
MDC_IDC_EPISODE_DTM: NORMAL
MDC_IDC_EPISODE_DURATION: 0 S
MDC_IDC_EPISODE_DURATION: 0 S
MDC_IDC_EPISODE_DURATION: 1 S
MDC_IDC_EPISODE_DURATION: 2 S
MDC_IDC_EPISODE_DURATION: 2 S
MDC_IDC_EPISODE_ID: NORMAL
MDC_IDC_EPISODE_TYPE: NORMAL
MDC_IDC_LEAD_IMPLANT_DT: NORMAL
MDC_IDC_LEAD_IMPLANT_DT: NORMAL
MDC_IDC_LEAD_LOCATION: NORMAL
MDC_IDC_LEAD_LOCATION: NORMAL
MDC_IDC_LEAD_LOCATION_DETAIL_1: NORMAL
MDC_IDC_LEAD_LOCATION_DETAIL_1: NORMAL
MDC_IDC_LEAD_MFG: NORMAL
MDC_IDC_LEAD_MFG: NORMAL
MDC_IDC_LEAD_MODEL: NORMAL
MDC_IDC_LEAD_MODEL: NORMAL
MDC_IDC_LEAD_POLARITY_TYPE: NORMAL
MDC_IDC_LEAD_POLARITY_TYPE: NORMAL
MDC_IDC_LEAD_SERIAL: NORMAL
MDC_IDC_LEAD_SERIAL: NORMAL
MDC_IDC_LEAD_SPECIAL_FUNCTION: NORMAL
MDC_IDC_LEAD_SPECIAL_FUNCTION: NORMAL
MDC_IDC_MSMT_BATTERY_DTM: NORMAL
MDC_IDC_MSMT_BATTERY_REMAINING_LONGEVITY: 134 MO
MDC_IDC_MSMT_BATTERY_RRT_TRIGGER: 2.62
MDC_IDC_MSMT_BATTERY_STATUS: NORMAL
MDC_IDC_MSMT_BATTERY_VOLTAGE: 3.02 V
MDC_IDC_MSMT_LEADCHNL_RA_IMPEDANCE_VALUE: 399 OHM
MDC_IDC_MSMT_LEADCHNL_RA_IMPEDANCE_VALUE: 494 OHM
MDC_IDC_MSMT_LEADCHNL_RV_IMPEDANCE_VALUE: 418 OHM
MDC_IDC_MSMT_LEADCHNL_RV_IMPEDANCE_VALUE: 494 OHM
MDC_IDC_MSMT_LEADCHNL_RV_PACING_THRESHOLD_AMPLITUDE: 0.75 V
MDC_IDC_MSMT_LEADCHNL_RV_PACING_THRESHOLD_PULSEWIDTH: 0.4 MS
MDC_IDC_MSMT_LEADCHNL_RV_SENSING_INTR_AMPL: 7.62 MV
MDC_IDC_PG_IMPLANT_DTM: NORMAL
MDC_IDC_PG_MFG: NORMAL
MDC_IDC_PG_MODEL: NORMAL
MDC_IDC_PG_SERIAL: NORMAL
MDC_IDC_PG_TYPE: NORMAL
MDC_IDC_SESS_CLINIC_NAME: NORMAL
MDC_IDC_SESS_DTM: NORMAL
MDC_IDC_SESS_TYPE: NORMAL
MDC_IDC_SET_BRADY_HYSTRATE: NORMAL
MDC_IDC_SET_BRADY_LOWRATE: 75 {BEATS}/MIN
MDC_IDC_SET_BRADY_MAX_SENSOR_RATE: 130 {BEATS}/MIN
MDC_IDC_SET_BRADY_MODE: NORMAL
MDC_IDC_SET_LEADCHNL_RA_SENSING_ANODE_ELECTRODE_1: NORMAL
MDC_IDC_SET_LEADCHNL_RA_SENSING_ANODE_LOCATION_1: NORMAL
MDC_IDC_SET_LEADCHNL_RA_SENSING_CATHODE_ELECTRODE_1: NORMAL
MDC_IDC_SET_LEADCHNL_RA_SENSING_CATHODE_LOCATION_1: NORMAL
MDC_IDC_SET_LEADCHNL_RA_SENSING_POLARITY: NORMAL
MDC_IDC_SET_LEADCHNL_RA_SENSING_SENSITIVITY: NORMAL
MDC_IDC_SET_LEADCHNL_RV_PACING_AMPLITUDE: 2 V
MDC_IDC_SET_LEADCHNL_RV_PACING_ANODE_ELECTRODE_1: NORMAL
MDC_IDC_SET_LEADCHNL_RV_PACING_ANODE_LOCATION_1: NORMAL
MDC_IDC_SET_LEADCHNL_RV_PACING_CAPTURE_MODE: NORMAL
MDC_IDC_SET_LEADCHNL_RV_PACING_CATHODE_ELECTRODE_1: NORMAL
MDC_IDC_SET_LEADCHNL_RV_PACING_CATHODE_LOCATION_1: NORMAL
MDC_IDC_SET_LEADCHNL_RV_PACING_POLARITY: NORMAL
MDC_IDC_SET_LEADCHNL_RV_PACING_PULSEWIDTH: 0.4 MS
MDC_IDC_SET_LEADCHNL_RV_SENSING_ANODE_ELECTRODE_1: NORMAL
MDC_IDC_SET_LEADCHNL_RV_SENSING_ANODE_LOCATION_1: NORMAL
MDC_IDC_SET_LEADCHNL_RV_SENSING_CATHODE_ELECTRODE_1: NORMAL
MDC_IDC_SET_LEADCHNL_RV_SENSING_CATHODE_LOCATION_1: NORMAL
MDC_IDC_SET_LEADCHNL_RV_SENSING_POLARITY: NORMAL
MDC_IDC_SET_LEADCHNL_RV_SENSING_SENSITIVITY: 0.9 MV
MDC_IDC_SET_ZONE_DETECTION_INTERVAL: 400 MS
MDC_IDC_SET_ZONE_TYPE: NORMAL
MDC_IDC_STAT_BRADY_AP_VP_PERCENT: 0 %
MDC_IDC_STAT_BRADY_AP_VS_PERCENT: 0 %
MDC_IDC_STAT_BRADY_AS_VP_PERCENT: 3.27 %
MDC_IDC_STAT_BRADY_AS_VS_PERCENT: 96.73 %
MDC_IDC_STAT_BRADY_DTM_END: NORMAL
MDC_IDC_STAT_BRADY_DTM_START: NORMAL
MDC_IDC_STAT_BRADY_RA_PERCENT_PACED: 0 %
MDC_IDC_STAT_BRADY_RV_PERCENT_PACED: 3.27 %
MDC_IDC_STAT_EPISODE_RECENT_COUNT: 0
MDC_IDC_STAT_EPISODE_RECENT_COUNT: 1
MDC_IDC_STAT_EPISODE_RECENT_COUNT: 62
MDC_IDC_STAT_EPISODE_RECENT_COUNT_DTM_END: NORMAL
MDC_IDC_STAT_EPISODE_RECENT_COUNT_DTM_START: NORMAL
MDC_IDC_STAT_EPISODE_TOTAL_COUNT: 210
MDC_IDC_STAT_EPISODE_TOTAL_COUNT: 3691
MDC_IDC_STAT_EPISODE_TOTAL_COUNT: NORMAL
MDC_IDC_STAT_EPISODE_TOTAL_COUNT_DTM_END: NORMAL
MDC_IDC_STAT_EPISODE_TOTAL_COUNT_DTM_START: NORMAL
MDC_IDC_STAT_EPISODE_TYPE: NORMAL

## 2023-03-06 ENCOUNTER — TELEPHONE (OUTPATIENT)
Dept: CARDIOLOGY | Facility: CLINIC | Age: 63
End: 2023-03-06

## 2023-03-06 ENCOUNTER — OFFICE VISIT (OUTPATIENT)
Dept: CARDIOLOGY | Facility: CLINIC | Age: 63
End: 2023-03-06
Attending: NURSE PRACTITIONER
Payer: COMMERCIAL

## 2023-03-06 ENCOUNTER — LAB (OUTPATIENT)
Dept: LAB | Facility: CLINIC | Age: 63
End: 2023-03-06
Payer: COMMERCIAL

## 2023-03-06 VITALS
WEIGHT: 105.8 LBS | BODY MASS INDEX: 22.73 KG/M2 | HEART RATE: 106 BPM | DIASTOLIC BLOOD PRESSURE: 87 MMHG | SYSTOLIC BLOOD PRESSURE: 130 MMHG | OXYGEN SATURATION: 97 %

## 2023-03-06 DIAGNOSIS — I50.9 CONGESTIVE HEART FAILURE, UNSPECIFIED HF CHRONICITY, UNSPECIFIED HEART FAILURE TYPE (H): ICD-10-CM

## 2023-03-06 DIAGNOSIS — I50.32 CHRONIC DIASTOLIC HEART FAILURE (H): ICD-10-CM

## 2023-03-06 LAB
ANION GAP SERPL CALCULATED.3IONS-SCNC: 12 MMOL/L (ref 7–15)
BUN SERPL-MCNC: 30.4 MG/DL (ref 8–23)
CALCIUM SERPL-MCNC: 10.5 MG/DL (ref 8.8–10.2)
CHLORIDE SERPL-SCNC: 85 MMOL/L (ref 98–107)
CREAT SERPL-MCNC: 1.08 MG/DL (ref 0.51–1.17)
DEPRECATED HCO3 PLAS-SCNC: 33 MMOL/L (ref 22–29)
GFR SERPL CREATININE-BSD FRML MDRD: 58 ML/MIN/1.73M2
GLUCOSE SERPL-MCNC: 95 MG/DL (ref 70–99)
POTASSIUM SERPL-SCNC: 3.7 MMOL/L (ref 3.4–5.3)
SODIUM SERPL-SCNC: 130 MMOL/L (ref 136–145)

## 2023-03-06 PROCEDURE — 36415 COLL VENOUS BLD VENIPUNCTURE: CPT | Performed by: PATHOLOGY

## 2023-03-06 PROCEDURE — G0463 HOSPITAL OUTPT CLINIC VISIT: HCPCS | Performed by: NURSE PRACTITIONER

## 2023-03-06 PROCEDURE — 80048 BASIC METABOLIC PNL TOTAL CA: CPT | Performed by: PATHOLOGY

## 2023-03-06 PROCEDURE — 99214 OFFICE O/P EST MOD 30 MIN: CPT | Performed by: NURSE PRACTITIONER

## 2023-03-06 ASSESSMENT — PAIN SCALES - GENERAL: PAINLEVEL: NO PAIN (0)

## 2023-03-06 NOTE — TELEPHONE ENCOUNTER
Spoke with pt to go over pre op appt schedule. Pt is aware of times and location and agreed to the plan.

## 2023-03-06 NOTE — NURSING NOTE
Chief Complaint   Patient presents with     Follow Up     Return CORE- Return CORE ; 62 year old with chronic diastolic heart failure presents for follow up with labs prior.      Vitals were taken and medications reconciled.    SHAWN Roberto  4:16 PM

## 2023-03-06 NOTE — PATIENT INSTRUCTIONS
CORE: Cardiomyopathy, Optimization, Rehabilitation, Education      Take your medicines every day, as directed    Changes/Recommendations made today:  No medication changes today     Monitor Your Weight and Symptoms    Contact us if you:    Gain 2 pounds in one day or 5 pounds in one week  Feel more short of breath  Notice more leg swelling  Feel lightheadeded   Change your lifestyle    Limit Salt or Sodium:  2000 mg  Limit Fluids:  2000 mL or approximately 64 ounces  Eat a Heart Healthy Diet  Low in saturated fats  Stay Active:  Aim to move at least 150 minutes every  week         To Contact us    During Business Hours:  [phone], option # 1      After hours, weekends or holidays:   [phone], Option #4  Ask to speak to the On-Call Cardiologist. Inform them you are a CORE/heart failure patient at the [facility].     Use [employer] allows you to communicate directly with your heart team through secure messaging.  [employer] can be accessed any time on your phone, computer, or tablet.  If you need assistance, we'd be happy to help!         Keep your Heart Appointments:    CORE visit in 6 weeks (mid-April)

## 2023-03-06 NOTE — PROGRESS NOTES
[facility] Cardiology   CORE Clinic      HPI:   Ms. 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 She was recently admitted 11/19/-12/5/22 with ADHF and frequent NSVT, now s/p VT ablation. She presents to [facility] for hospital follow up.     With regards to her cardiac conditions, as noted, Ms. Michel presented to [facility] on 11/19 with MONTALVO, BLE edema, and 8 pound weight gain over the course of one week. Chest X-ray was suspicious for pulmonary edema, NT-P BNP 1,913, Troponin T 21-->12. EDW per chart review 116-120; admission weight 128. She was admitted w/ decompensated diastolic heart failure with pictutre c/b frequent non sustained ventricular tachycardia with RHC on 11/28/22 notable for cardiogenic shock with a cardiac index of 1.2 and CO of 1.6. Etiology thought to most likely secondary to frequent non sustained ventricular tachycardia in the setting of possible restrictive/constrictive physiology given her malignancy history (albeit presumably in remission). CAD ruled on by coronary angiogram completed on 11/28/22. She underwent VT ablation on 12/1/22 and had successful resumption of her home medications with increased diuretic dosing and adddition of an SGLT2 inhibitor. She was diuresed 19 lbs to a weight of 109 lbs at time of discharge.     At CORE visit on 1/9/23 Ms. Michel was hypervolemic as  evident by weight gain, tachycardia, and peripheral edema. Review of labs demonstrated persistent hyponatremia with Na 127 and bump in Cr to 1.35. Lasix was increase 60 mg BID. Still awaiting for PA for cardiomems device. Admitted 1/24-1/29 for CHF  exacerbation. Diuresed and switched to torsemide 40 mg BID. Discharged at a weight of 107 lb.     Device interrogation from 2/7 with rates 120s-140, AFL. Diltiazem increased to 240 mg daily. Since our last CORE visit in 2/2023 she has resumed torsemide 20 mg BID and spironolactone 12.5 mg daily. She was evaluated by Dr. Cope for severe TR. Plan to proceed with TVR on 3/27/23.     Today, Ms. Michel reports feeling well. Her weights have been stable at 103-104 lb. She is without peripheral edema, worsening dyspnea on exertion, orthopnea, or PND. Has some MONTALVO but feels it proportional to the activity. Denies chest pain, SOB, palpitations, lightheadedness, syncope, presyncope. Remains on eliquis for CVA prophylaxis. Denies any bleeding episodes.     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     VSD (ventricular septal defect)        FAMILY HISTORY:  Family History   Problem Relation Age of Onset     Breast Cancer Mother         60s     Hypertension Mother      Alzheimer Disease Mother      Cerebrovascular Disease Mother      Hypertension Father      Cerebrovascular Disease Father      Atrial fibrillation Father      Lymphoma Father      Prostate Cancer Father      Alzheimer Disease Maternal Grandmother         likely     Arthritis Maternal Grandfather      Pneumonia Maternal Grandfather      Diabetes Paternal Grandmother        SOCIAL HISTORY:  Social History     Socioeconomic History     Marital status:      Spouse name: Romeo Michel     Number of children: 0   Occupational History     Employer:  [employer]   Tobacco Use     Smoking status: Never     Smokeless tobacco: Never   Substance and Sexual Activity     Alcohol use: Not Currently     Comment: none     Drug use: No   Other Topics Concern     Parent/sibling w/ CABG, MI or angioplasty before 65F 55M? No     Social Determinants of Health     Intimate Partner Violence: Not At Risk     Fear of Current or Ex-Partner: No     Emotionally Abused: No     Physically Abused: No     Sexually Abused: No       CURRENT MEDICATIONS:  acetaminophen (TYLENOL) 500 MG tablet, Take 500 mg by mouth every 6 hours as needed for mild pain  alendronate (FOSAMAX) 70 MG tablet, Take 1 tablet (70 mg) by mouth every 7 days  apixaban ANTICOAGULANT (ELIQUIS ANTICOAGULANT) 5 MG tablet, Take 1 tablet (5 mg) by mouth 2 times daily  calcium carbonate 600 mg-vitamin D 400 units (CALTRATE) 600-400 MG-UNIT per tablet, Take 2 tablets by mouth daily  dexamethasone (DECADRON) 4 MG/ML injection, Inject 4 mg IM for adrenal crisis  diltiazem ER COATED BEADS (CARDIZEM CD/CARTIA XT) 240 MG 24 hr capsule, Take 1 capsule (240 mg) by mouth daily  empagliflozin (JARDIANCE) 10 MG TABS tablet, Take 1 tablet (10 mg) by mouth daily for 360 days  gabapentin (NEURONTIN) 100 MG capsule, Take 1 capsule AM + 1 capsule afternoon + 2 capsules at bedtime  hydroxychloroquine (PLAQUENIL) 200 MG tablet, TAKE 1 TABLET(200 MG) BY MOUTH DAILY (Patient taking differently: Take 200 mg by mouth every evening)  loratadine (CLARITIN) 10 MG tablet, Take 10 mg by mouth daily  magnesium oxide 200 MG TABS, Take 200 mg by mouth daily bedtime  metolazone (ZAROXOLYN) 2.5 MG tablet, Take 1 tablet (2.5 mg) by mouth as needed (ONLY take if instructed to do so by your cardiology team.)  multivitamin, therapeutic with minerals (THERA-VIT-M) TABS tablet, Take 1 tablet by mouth daily  predniSONE (DELTASONE) 1 MG tablet, Take 3 tablets (3 mg) by mouth daily - Oral  predniSONE (DELTASONE) 5 MG tablet, 10 mg when sick  for 3 days or until back to baseline for secondary adrenal insufficiency  spironolactone (ALDACTONE) 25 MG tablet, Take 0.5 tablets (12.5 mg) by mouth daily  STATIN NOT PRESCRIBED (INTENTIONAL), Please choose reason not prescribed, below (Patient not taking: Reported on 2/10/2023)  torsemide (DEMADEX) 20 MG tablet, Take 1 tablet (20 mg) by mouth 2 times daily  Vitamin D (Cholecalciferol) 10 MCG (400 UNIT) TABS, Take 1 tablet by mouth daily    No current facility-administered medications on file prior to visit.      ROS:   Refer to HPI    EXAM:  There were no vitals taken for this visit.  GENERAL: Appears comfortable, in no acute distress.   HEENT: Eye symmetrical, no discharge or icterus bilaterally. Mucous membranes moist and without lesions.  CV: tachycardic, irregular, +S1S2, systolic murmur, no rub, or gallop. JVP at clavicle   RESPIRATORY: Respirations regular, even, and unlabored. Lungs CTA throughout.   GI: Soft and non distended with normoactive bowel sounds present in all quadrants. No tenderness, rebound, guarding. No hepatomegaly.   EXTREMITIES: No peripheral edema. 2+ bilateral pedal pulses.   NEUROLOGIC: Alert and oriented x 3. No focal deficits.   MUSCULOSKELETAL: No joint swelling or tenderness.   SKIN: No jaundice. No rashes or lesions.     Labs, reviewed with patient in clinic today:  CBC RESULTS:  Lab Results   Component Value Date    WBC 6.7 02/13/2023    WBC Canceled, Test credited 03/16/2021    RBC 4.30 02/13/2023    RBC Canceled, Test credited 03/16/2021    HGB 15.5 02/13/2023    HGB Canceled, Test credited 03/16/2021    HCT 44.6 02/13/2023    HCT Canceled, Test credited 03/16/2021     (H) 02/13/2023    MCV Canceled, Test credited 03/16/2021    MCH 36.0 (H) 02/13/2023    MCH Canceled, Test credited 03/16/2021    MCHC 34.8 02/13/2023    MCHC Canceled, Test credited 03/16/2021    RDW 14.2 02/13/2023    RDW Canceled, Test credited 03/16/2021     (L) 02/13/2023    PLT Canceled, Test  credited 03/16/2021       CMP RESULTS:  Lab Results   Component Value Date     (L) 02/24/2023     (L) 11/29/2022     06/23/2021    POTASSIUM 3.7 02/24/2023    POTASSIUM 4.7 11/19/2022    POTASSIUM 3.9 07/20/2022    POTASSIUM 4.2 06/23/2021    CHLORIDE 84 (L) 02/24/2023    CHLORIDE 101 07/20/2022    CHLORIDE 102 06/23/2021    CO2 30 (H) 02/24/2023    CO2 28 07/20/2022    CO2 28 06/23/2021    ANIONGAP 12 02/24/2023    ANIONGAP 7 07/20/2022    ANIONGAP 6 06/23/2021     (H) 02/24/2023    GLC 92 12/01/2022     (H) 07/20/2022    GLC 98 06/23/2021    BUN 40.9 (H) 02/24/2023    BUN 31 (H) 07/20/2022    BUN 25 06/23/2021    CR 1.10 02/24/2023    CR 1.05 (H) 06/23/2021    GFRESTIMATED 57 (L) 02/24/2023    GFRESTIMATED 57 (L) 06/23/2021    GFRESTBLACK 67 06/23/2021    VIKTORIYA 10.1 02/24/2023    VIKTORIYA 10.0 06/23/2021    BILITOTAL 2.0 (H) 12/21/2022    BILITOTAL 1.2 06/23/2021    ALBUMIN 4.7 02/13/2023    ALBUMIN 4.4 04/20/2022    ALBUMIN 4.2 06/23/2021    ALKPHOS 132 (H) 12/21/2022    ALKPHOS 136 06/23/2021    ALT 39 12/21/2022    ALT 41 06/23/2021    AST 65 (H) 12/21/2022    AST 36 06/23/2021        INR RESULTS:  Lab Results   Component Value Date    INR 4.57 (H) 11/19/2022    INR 3.42 (H) 03/07/2018       Lab Results   Component Value Date    MAG 2.1 01/30/2023    MAG 1.9 04/12/2018     Lab Results   Component Value Date    NTBNPI 2,067 (H) 01/24/2023    NTBNPI 485 12/12/2019     Lab Results   Component Value Date    NTBNP 1,425 (H) 10/13/2021    NTBNP 1,274 (H) 05/26/2021       Cardiac Diagnostics:    2/7/2023 Device Interrogation   Device: [employer] [id number] [place] XT DR GALLEGOS  Normal Device Function.  Mode: VVIR  bpm  : 8.6%  Presenting EGM: Narrow Complex tachycardia @ 139 bpm  Short V-V intervals: 0  Lead Trends Appear Stable.  Estimated battery longevity to RRT = 11 years. Battery voltage = 3.02 V.  Atrial Arrhythmia: AFL  Anticoagulant: Apixaban  Ventricular Arrhythmia: 4 episodes lasting  1-4 sec 194-245 bpm  Transmission discussed with Maria Esther Cutler NP. Orders to increase diltiazem from 180 mg daily to 240 mg daily.    12/5/2022 ECHO  Interpretation Summary  Severe tricuspid insufficiency is present.  Moderate right ventricular dilation is present. Global right ventricular  function is mildly reduced.  Global and regional left ventricular function is normal with an EF of 55-60%.  IVC diameter >2.1 cm collapsing <50% with sniff suggests a high RA pressure  estimated at 15 mmHg or greater.  No pericardial effusion is present.  No significant changes noted.    11/28/2022 RHC with coronary angiogram     Right sided filling pressures are severely elevated.    Mild elevated pulmonary hypertension.    Left sided filling pressures are moderately elevated.    Reduced cardiac output level.    Hemodynamic data has been modified in Epic per physician review.    Prox LAD lesion is 30% stenosed.     Mild non-obstructive coronary artery disease        Assessment and Plan:   Ms. 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 She was recently admitted 11/19/-12/5/22 with ADHF and frequent NSVT, now s/p VT ablation. She presents to [facility] for hospital follow up.     Overall, appearing well. Weights are very stable at 103-104 lbs. Today's labs are improved with Na 130, K 3.7, and Cr 1.08. Blood pressures are controlled. No medication changes. Ms. Michel understands to call with any weight gain or changes in symptoms. She remains in persistent AFL. She is on eliquis but I am recommending bridge with lovenox prior to surgery. This has been  communicated to Dr. Cope's team.     Continuing with appeal for cardioMEMs.       # Acute on chronic diastolic heart failure with dilated and mildly reduced RV function (LVEF 55-60%)  Pre-Ablation: 11/28/22 RHC: CVP 24, PA 48/27/34, PCWP 23, SVO2 46, CI 1.1, CO 1.6  Post Ablation and Diuresis: 12/3/22 RHC: CVP 16, PA 41/22/28, PCWP 15, SVO2 69, CI 2.1, CO 3.0    NYHA Class III, AHA/ACC Stage C  Rate control: 109  volume status: euvolemic, continue torsemide 20 mg BID   Blood pressure control: Diltiazem 240 mg daily   Aldosterone antagonist: Aldactone 12.5 mg daily   SGLT2:  Empagliflozin 10 mg daily   Evaluation of coronary arteries: 11/28/22 angiogram mild non-obstructive CAD     # Frequent, non-sustained ventricular tachycardia s/p VT Ablation (by Dr. Eaton on 12/1/22)  - Anti-arrhythmic: none, s/p VT ablation  - Stopped PTA digoxin per EP recommendations  - Anticoagulation: given history of Afib, continue Apixaban 5 mg bid  - Follow up with EP specialists (Dr. Eaton and/or Maria Esther Cutler NP)    # HTN Intolerant to Metoprolol in the past.   - Diltiazem 240 mg daily      # Aflutter. History of SSS s/p PPM 12/19. Long standing history of atrial arrythmias.  - Diltiazem 240 mg daily  - Continue Eliquis.   - Follow up with EP as scheduled.      # VSD s/p repair.     # History of exudative pericardial effusion.   - Stable per CT 3/19.     # Severe TR per TTE 11/2022  Secondary to non coaptation of tricuspid valves. Seen on BRE from 11/22/22 and remained severe on repeat TTE on 12/5/22 despite achieving clinical euvolemia and presumed dry weight. Patient was evaluated by surgery during this admission (Dr. Cope) who thought she may be a candidate for surgical intervention.   - evaluated by Dr. Cope, plan for TVR 3/27/23         Follow up:  - CORE in 6 weeks s/p TVR           Elmira Vasquez DNP, NP-C  Advance Heart Failure  3/6/2023

## 2023-03-06 NOTE — LETTER
3/6/2023      RE: Amelia Michel  [address]  [facility] [address]       Dear Colleague,    Thank you for the opportunity to participate in the care of your patient, Amelia Michel, at the [facility] [place] at [facility]. Please see a copy of my visit note below.      [facility] Cardiology   CORE Clinic      HPI:   Ms. 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 She was recently admitted 11/19/-12/5/22 with ADHF and frequent NSVT, now s/p VT ablation. She presents to [facility] for hospital follow up.     With regards to her cardiac conditions, as noted, Ms. Michel presented to [facility] on 11/19 with MONTALVO, BLE edema, and 8 pound weight gain over the course of one week. Chest X-ray was suspicious for pulmonary edema, NT-P BNP 1,913, Troponin T 21-->12. EDW per chart review 116-120; admission weight 128. She was admitted w/ decompensated diastolic heart failure with pictutre c/b frequent non sustained ventricular tachycardia with RHC on 11/28/22 notable for cardiogenic shock with a cardiac index of 1.2 and CO of 1.6. Etiology thought to most likely secondary to frequent non sustained ventricular tachycardia in the setting of possible restrictive/constrictive physiology given her malignancy history (albeit presumably in remission). CAD ruled on by coronary angiogram completed on 11/28/22. She underwent VT ablation on 12/1/22 and had successful resumption of her home medications with increased diuretic dosing and adddition of an SGLT2 inhibitor. She was diuresed 19 lbs to a  weight of 109 lbs at time of discharge.     At CORE visit on 1/9/23 Ms. Michel was hypervolemic as  evident by weight gain, tachycardia, and peripheral edema. Review of labs demonstrated persistent hyponatremia with Na 127 and bump in Cr to 1.35. Lasix was increase 60 mg BID. Still awaiting for PA for cardiomems device. Admitted 1/24-1/29 for CHF exacerbation. Diuresed and switched to torsemide 40 mg BID. Discharged at a weight of 107 lb.     Device interrogation from 2/7 with rates 120s-140, AFL. Diltiazem increased to 240 mg daily. Since our last CORE visit in 2/2023 she has resumed torsemide 20 mg BID and spironolactone 12.5 mg daily. She was evaluated by Dr. Cope for severe TR. Plan to proceed with TVR on 3/27/23.     Today, Ms. Michel reports feeling well. Her weights have been stable at 103-104 lb. She is without peripheral edema, worsening dyspnea on exertion, orthopnea, or PND. Has some MONTALVO but feels it proportional to the activity. Denies chest pain, SOB, palpitations, lightheadedness, syncope, presyncope. Remains on eliquis for CVA prophylaxis. Denies any bleeding episodes.     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     VSD (ventricular septal defect)        FAMILY HISTORY:  Family History   Problem Relation Age of Onset     Breast Cancer Mother         60s     Hypertension Mother      Alzheimer Disease Mother      Cerebrovascular Disease Mother      Hypertension Father      Cerebrovascular Disease Father      Atrial fibrillation Father      Lymphoma Father      Prostate Cancer Father       Alzheimer Disease Maternal Grandmother         likely     Arthritis Maternal Grandfather      Pneumonia Maternal Grandfather      Diabetes Paternal Grandmother        SOCIAL HISTORY:  Social History     Socioeconomic History     Marital status:      Spouse name: Romeo Michel     Number of children: 0   Occupational History     Employer: [employer]   Tobacco Use     Smoking status: Never     Smokeless tobacco: Never   Substance and Sexual Activity     Alcohol use: Not Currently     Comment: none     Drug use: No   Other Topics Concern     Parent/sibling w/ CABG, MI or angioplasty before 65F 55M? No     Social Determinants of Health     Intimate Partner Violence: Not At Risk     Fear of Current or Ex-Partner: No     Emotionally Abused: No     Physically Abused: No     Sexually Abused: No       CURRENT MEDICATIONS:  acetaminophen (TYLENOL) 500 MG tablet, Take 500 mg by mouth every 6 hours as needed for mild pain  alendronate (FOSAMAX) 70 MG tablet, Take 1 tablet (70 mg) by mouth every 7 days  apixaban ANTICOAGULANT (ELIQUIS ANTICOAGULANT) 5 MG tablet, Take 1 tablet (5 mg) by mouth 2 times daily  calcium carbonate 600 mg-vitamin D 400 units (CALTRATE) 600-400 MG-UNIT per tablet, Take 2 tablets by mouth daily  dexamethasone (DECADRON) 4 MG/ML injection, Inject 4 mg IM for adrenal crisis  diltiazem ER COATED BEADS (CARDIZEM CD/CARTIA XT) 240 MG 24 hr capsule, Take 1 capsule (240 mg) by mouth daily  empagliflozin (JARDIANCE) 10 MG TABS tablet, Take 1 tablet (10 mg) by mouth daily for 360 days  gabapentin (NEURONTIN) 100 MG capsule, Take 1 capsule AM + 1 capsule afternoon + 2 capsules at bedtime  hydroxychloroquine (PLAQUENIL) 200 MG tablet, TAKE 1 TABLET(200 MG) BY MOUTH DAILY (Patient taking differently: Take 200 mg by mouth every evening)  loratadine (CLARITIN) 10 MG tablet, Take 10 mg by mouth daily  magnesium oxide 200 MG TABS, Take 200 mg by mouth daily bedtime  metolazone  (ZAROXOLYN) 2.5 MG tablet, Take 1 tablet (2.5 mg) by mouth as needed (ONLY take if instructed to do so by your cardiology team.)  multivitamin, therapeutic with minerals (THERA-VIT-M) TABS tablet, Take 1 tablet by mouth daily  predniSONE (DELTASONE) 1 MG tablet, Take 3 tablets (3 mg) by mouth daily - Oral  predniSONE (DELTASONE) 5 MG tablet, 10 mg when sick for 3 days or until back to baseline for secondary adrenal insufficiency  spironolactone (ALDACTONE) 25 MG tablet, Take 0.5 tablets (12.5 mg) by mouth daily  STATIN NOT PRESCRIBED (INTENTIONAL), Please choose reason not prescribed, below (Patient not taking: Reported on 2/10/2023)  torsemide (DEMADEX) 20 MG tablet, Take 1 tablet (20 mg) by mouth 2 times daily  Vitamin D (Cholecalciferol) 10 MCG (400 UNIT) TABS, Take 1 tablet by mouth daily    No current facility-administered medications on file prior to visit.      ROS:   Refer to HPI    EXAM:  There were no vitals taken for this visit.  GENERAL: Appears comfortable, in no acute distress.   HEENT: Eye symmetrical, no discharge or icterus bilaterally. Mucous membranes moist and without lesions.  CV: tachycardic, irregular, +S1S2, systolic murmur, no rub, or gallop. JVP at clavicle   RESPIRATORY: Respirations regular, even, and unlabored. Lungs CTA throughout.   GI: Soft and non distended with normoactive bowel sounds present in all quadrants. No tenderness, rebound, guarding. No hepatomegaly.   EXTREMITIES: No peripheral edema. 2+ bilateral pedal pulses.   NEUROLOGIC: Alert and oriented x 3. No focal deficits.   MUSCULOSKELETAL: No joint swelling or tenderness.   SKIN: No jaundice. No rashes or lesions.     Labs, reviewed with patient in clinic today:  CBC RESULTS:  Lab Results   Component Value Date    WBC 6.7 02/13/2023    WBC Canceled, Test credited 03/16/2021    RBC 4.30 02/13/2023    RBC Canceled, Test credited 03/16/2021    HGB 15.5 02/13/2023    HGB Canceled, Test credited 03/16/2021    HCT 44.6 02/13/2023     HCT Canceled, Test credited 03/16/2021     (H) 02/13/2023    MCV Canceled, Test credited 03/16/2021    MCH 36.0 (H) 02/13/2023    MCH Canceled, Test credited 03/16/2021    MCHC 34.8 02/13/2023    MCHC Canceled, Test credited 03/16/2021    RDW 14.2 02/13/2023    RDW Canceled, Test credited 03/16/2021     (L) 02/13/2023    PLT Canceled, Test credited 03/16/2021       CMP RESULTS:  Lab Results   Component Value Date     (L) 02/24/2023     (L) 11/29/2022     06/23/2021    POTASSIUM 3.7 02/24/2023    POTASSIUM 4.7 11/19/2022    POTASSIUM 3.9 07/20/2022    POTASSIUM 4.2 06/23/2021    CHLORIDE 84 (L) 02/24/2023    CHLORIDE 101 07/20/2022    CHLORIDE 102 06/23/2021    CO2 30 (H) 02/24/2023    CO2 28 07/20/2022    CO2 28 06/23/2021    ANIONGAP 12 02/24/2023    ANIONGAP 7 07/20/2022    ANIONGAP 6 06/23/2021     (H) 02/24/2023    GLC 92 12/01/2022     (H) 07/20/2022    GLC 98 06/23/2021    BUN 40.9 (H) 02/24/2023    BUN 31 (H) 07/20/2022    BUN 25 06/23/2021    CR 1.10 02/24/2023    CR 1.05 (H) 06/23/2021    GFRESTIMATED 57 (L) 02/24/2023    GFRESTIMATED 57 (L) 06/23/2021    GFRESTBLACK 67 06/23/2021    VIKTORIYA 10.1 02/24/2023    VIKTORIYA 10.0 06/23/2021    BILITOTAL 2.0 (H) 12/21/2022    BILITOTAL 1.2 06/23/2021    ALBUMIN 4.7 02/13/2023    ALBUMIN 4.4 04/20/2022    ALBUMIN 4.2 06/23/2021    ALKPHOS 132 (H) 12/21/2022    ALKPHOS 136 06/23/2021    ALT 39 12/21/2022    ALT 41 06/23/2021    AST 65 (H) 12/21/2022    AST 36 06/23/2021        INR RESULTS:  Lab Results   Component Value Date    INR 4.57 (H) 11/19/2022    INR 3.42 (H) 03/07/2018       Lab Results   Component Value Date    MAG 2.1 01/30/2023    MAG 1.9 04/12/2018     Lab Results   Component Value Date    NTBNPI 2,067 (H) 01/24/2023    NTBNPI 485 12/12/2019     Lab Results   Component Value Date    NTBNP 1,425 (H) 10/13/2021    NTBNP 1,274 (H) 05/26/2021       Cardiac Diagnostics:    2/7/2023 Device Interrogation   Device:  Medtronic [id number] Claudia XT DR GALLEGOS  Normal Device Function.  Mode: VVIR  bpm  : 8.6%  Presenting EGM: Narrow Complex tachycardia @ 139 bpm  Short V-V intervals: 0  Lead Trends Appear Stable.  Estimated battery longevity to RRT = 11 years. Battery voltage = 3.02 V.  Atrial Arrhythmia: AFL  Anticoagulant: Apixaban  Ventricular Arrhythmia: 4 episodes lasting 1-4 sec 194-245 bpm  Transmission discussed with Maria Esther Cutler NP. Orders to increase diltiazem from 180 mg daily to 240 mg daily.    12/5/2022 ECHO  Interpretation Summary  Severe tricuspid insufficiency is present.  Moderate right ventricular dilation is present. Global right ventricular  function is mildly reduced.  Global and regional left ventricular function is normal with an EF of 55-60%.  IVC diameter >2.1 cm collapsing <50% with sniff suggests a high RA pressure  estimated at 15 mmHg or greater.  No pericardial effusion is present.  No significant changes noted.    11/28/2022 RHC with coronary angiogram     Right sided filling pressures are severely elevated.    Mild elevated pulmonary hypertension.    Left sided filling pressures are moderately elevated.    Reduced cardiac output level.    Hemodynamic data has been modified in Epic per physician review.    Prox LAD lesion is 30% stenosed.     Mild non-obstructive coronary artery disease        Assessment and Plan:   Ms. 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 She was recently admitted 11/19/-12/5/22 with ADHF and frequent NSVT, now s/p VT ablation. She presents to [facility] for hospital follow up.     Overall, appearing  well. Weights are very stable at 103-104 lbs. Today's labs are improved with Na 130, K 3.7, and Cr 1.08. Blood pressures are controlled. No medication changes. Ms. Michel understands to call with any weight gain or changes in symptoms. She remains in persistent AFL. She is on eliquis but I am recommending bridge with lovenox prior to surgery. This has been communicated to Dr. Cope's team.     Continuing with appeal for cardioMEMs.       # Acute on chronic diastolic heart failure with dilated and mildly reduced RV function (LVEF 55-60%)  Pre-Ablation: 11/28/22 RHC: CVP 24, PA 48/27/34, PCWP 23, SVO2 46, CI 1.1, CO 1.6  Post Ablation and Diuresis: 12/3/22 RHC: CVP 16, PA 41/22/28, PCWP 15, SVO2 69, CI 2.1, CO 3.0    NYHA Class III, AHA/ACC Stage C  Rate control: 109  volume status: euvolemic, continue torsemide 20 mg BID   Blood pressure control: Diltiazem 240 mg daily   Aldosterone antagonist: Aldactone 12.5 mg daily   SGLT2:  Empagliflozin 10 mg daily   Evaluation of coronary arteries: 11/28/22 angiogram mild non-obstructive CAD     # Frequent, non-sustained ventricular tachycardia s/p VT Ablation (by Dr. Eaton on 12/1/22)  - Anti-arrhythmic: none, s/p VT ablation  - Stopped PTA digoxin per EP recommendations  - Anticoagulation: given history of Afib, continue Apixaban 5 mg bid  - Follow up with EP specialists (Dr. Eaton and/or Maria Esther Cutler NP)    # HTN Intolerant to Metoprolol in the past.   - Diltiazem 240 mg daily      # Aflutter. History of SSS s/p PPM 12/19. Long standing history of atrial arrythmias.  - Diltiazem 240 mg daily  - Continue Eliquis.   - Follow up with EP as scheduled.      # VSD s/p repair.     # History of exudative pericardial effusion.   - Stable per CT 3/19.     # Severe TR per TTE 11/2022  Secondary to non coaptation of tricuspid valves. Seen on BRE from 11/22/22 and remained severe on repeat TTE on 12/5/22 despite achieving clinical euvolemia and presumed dry weight. Patient was  evaluated by surgery during this admission (Dr. Cope) who thought she may be a candidate for surgical intervention.   - evaluated by Dr. Cope, plan for TVR 3/27/23     Follow up:  - CORE in 6 weeks s/p TVR       Elmira Vasquez DNP, NP-C  Advance Heart Failure  3/6/2023

## 2023-03-07 ENCOUNTER — TELEPHONE (OUTPATIENT)
Dept: CARDIOLOGY | Facility: CLINIC | Age: 63
End: 2023-03-07
Payer: COMMERCIAL

## 2023-03-07 NOTE — TELEPHONE ENCOUNTER
FUTURE VISIT INFORMATION      SURGERY INFORMATION:    Date: 3/27/23    Location: uu or    Surgeon:  Chilango Cope MD    Anesthesia Type:  general    Procedure: MINIMALLY INVASIVE TRICUSPID VALVE REPAIR ALL INDICATED PROCEDURES     RECORDS REQUESTED FROM:       Primary Care Provider[facility] G[facility] Stringer [facility]    Pertinent Medical History: Hypertension, Paroxysmal atrial fibrillation, atrial tachycardia, atrial flutter, heart palpitations, bradycardia, CHF    Most recent EKG+ Tracing: 3/2/23    Most recent ECHO: 11/19/22

## 2023-03-07 NOTE — PROGRESS NOTES
"CVTS Followup  Ms. Mcihel is known to me from inpatient hospital consult.   She was admitted for heart failure exaccerbation.  Currently she feels she is doing well.    /76 (BP Location: Right arm, Patient Position: Chair, Cuff Size: Adult Regular)   Pulse 79   Ht 1.453 m (4' 9.21\")   Wt 48.9 kg (107 lb 11.2 oz)   SpO2 97%   BMI 23.14 kg/m      In chair, conversant, comfortable.  CTAB, RRR  Abd soft, not tender, not distended  Motor, sensation, pulses intact    A/P: 62yoF with severe tricuspid insufficiency  - Plan for minimally invasive tricuspid valve repair / replacement  - Please call with questions    Chilango Cope  Cardiothoracic surgery  [phone]    "

## 2023-03-08 ENCOUNTER — TELEPHONE (OUTPATIENT)
Dept: CARDIOLOGY | Facility: CLINIC | Age: 63
End: 2023-03-08
Payer: COMMERCIAL

## 2023-03-08 DIAGNOSIS — I50.33 ACUTE ON CHRONIC DIASTOLIC HEART FAILURE (H): ICD-10-CM

## 2023-03-08 DIAGNOSIS — I47.19 ATRIAL TACHYCARDIA (H): Primary | ICD-10-CM

## 2023-03-08 DIAGNOSIS — I07.1 TRICUSPID VALVE INSUFFICIENCY, UNSPECIFIED ETIOLOGY: Primary | ICD-10-CM

## 2023-03-08 DIAGNOSIS — R00.1 BRADYCARDIA: ICD-10-CM

## 2023-03-08 DIAGNOSIS — I48.92 ATRIAL FLUTTER, UNSPECIFIED TYPE (H): ICD-10-CM

## 2023-03-08 RX ORDER — ENOXAPARIN SODIUM 100 MG/ML
0.75 INJECTION SUBCUTANEOUS 2 TIMES DAILY
Qty: 2.4 ML | Refills: 0 | Status: SHIPPED | OUTPATIENT
Start: 2023-03-08 | End: 2023-03-11

## 2023-03-08 NOTE — PROGRESS NOTES
Date: 3/8/2023    Time of Call: 1:32 PM     Diagnosis: palpitations     [ TORB ] Ordering provider: Dr. Eaton  Order: 7 day zio     Order received by: ABBEY Bowen     Follow-up/additional notes: Patient updated, message sent to scheduling.  Jessi Dwyer RN on 3/8/2023 at 1:33 PM

## 2023-03-08 NOTE — TELEPHONE ENCOUNTER
Spoke with patient regarding jardiance and eliquis holds as well as lovenox bridge. Will order lovenox injections to be sent to patients local pharmacy. Patient calling dentist today to schedule dental clearance exam.

## 2023-03-14 ENCOUNTER — HOSPITAL ENCOUNTER (OUTPATIENT)
Dept: PHYSICAL THERAPY | Facility: REHABILITATION | Age: 63
Discharge: HOME | End: 2023-03-14
Payer: COMMERCIAL

## 2023-03-14 ENCOUNTER — TRANSFERRED RECORDS (OUTPATIENT)
Dept: HEALTH INFORMATION MANAGEMENT | Facility: CLINIC | Age: 63
End: 2023-03-14

## 2023-03-14 ENCOUNTER — DOCUMENTATION ONLY (OUTPATIENT)
Dept: SURGERY | Facility: CLINIC | Age: 63
End: 2023-03-14

## 2023-03-14 DIAGNOSIS — M25.511 ACUTE PAIN OF RIGHT SHOULDER: ICD-10-CM

## 2023-03-14 DIAGNOSIS — M62.81 MUSCLE WEAKNESS (GENERALIZED): ICD-10-CM

## 2023-03-14 DIAGNOSIS — M62.81 GENERALIZED MUSCLE WEAKNESS: Primary | ICD-10-CM

## 2023-03-14 PROCEDURE — 97110 THERAPEUTIC EXERCISES: CPT | Mod: GP | Performed by: PHYSICAL THERAPIST

## 2023-03-14 PROCEDURE — 97140 MANUAL THERAPY 1/> REGIONS: CPT | Mod: GP | Performed by: PHYSICAL THERAPIST

## 2023-03-14 NOTE — PROGRESS NOTES
Dental Service Clearance Letter  Amelia Michel is cleared for cardiac surgery from a dental standpoint after having received care at the [facility]. Comprehensive evaluation, Panoramic x-ray and adult prophy with oral hygiene reinforcement were completed. She has been given oral hygiene instructions to help with any potential adverse effects of her future treatments.  We will provide her with follow-up care to assist with any concerns that may arise.  Thank you for allowing us to participate in the comprehensive care of Amelia Michel.  For any questions or concerns, please contact me or the dental care coordinator ([phone]).        Rosalba Borges MD  PGY 1  Pager: [phone]

## 2023-03-16 LAB
ABO/RH(D): NORMAL
ANTIBODY SCREEN: NEGATIVE
SPECIMEN EXPIRATION DATE: NORMAL

## 2023-03-16 RX ORDER — DOCUSATE SODIUM 100 MG/1
100 CAPSULE, LIQUID FILLED ORAL 2 TIMES DAILY PRN
COMMUNITY

## 2023-03-16 NOTE — PHARMACY - PREOPERATIVE ASSESSMENT CENTER
Preoperative Assessment Center Medication History Note    Medication history completed on March 16, 2023 by this writer. See Epic admission navigator for prior to admission medications. Operating room staff will still need to confirm medications and last dose information on day of surgery.     Medication history interview sources  Patient interview: Yes  Care Everywhere records: No  Surescripts pharmacy refill records: Yes    Changes made to PTA medication list  Added:   -docusate  Deleted: none  Changed:   -gabapentin 100-100-200 mg TID > 100 mg QID (per pt)    Additional medication history information (including reliability of information, actions taken by pharmacist):  -Pt manages her own meds and is a reliable historian   -metolazone - no doses required in past ~3 months  -prednisone 10 mg - pt has not had to use any doses for adrenal crisis, only taking prednisone 3 mg daily  -dexamethasone - pt has on hand but has not needed to use for adrenal crisis  -Allergies reviewed, added clarifying comments to Penicillins allergy    -- No recent (within 30 days) course of antibiotics  -- Reports being on blood thinning medications - apixaban 5 mg BID   -- Declines being on any other prescription or over-the-counter medications    Prior to Admission medications    Medication Sig Last Dose Taking? Auth Provider Long Term End Date   acetaminophen (TYLENOL) 500 MG tablet Take 500 mg by mouth every 6 hours as needed for mild pain Taking Yes Unknown, Entered By History     alendronate (FOSAMAX) 70 MG tablet Take 1 tablet (70 mg) by mouth every 7 days  Patient taking differently: Take 70 mg by mouth every 7 days Mondays Taking Yes Dima Shrestha MD Yes    apixaban ANTICOAGULANT (ELIQUIS ANTICOAGULANT) 5 MG tablet Take 1 tablet (5 mg) by mouth 2 times daily Taking Yes Halle Vasquez APRN CNP No    calcium carbonate 600 mg-vitamin D 400 units (CALTRATE) 600-400 MG-UNIT per tablet Take 2 tablets by mouth daily Taking  Yes Unknown, Entered By History     dexamethasone (DECADRON) 4 MG/ML injection Inject 4 mg IM for adrenal crisis  Patient taking differently: Inject 4 mg as directed once as needed (adrenal crisis) Taking Yes Dima Shrestha MD Yes    diltiazem ER COATED BEADS (CARDIZEM CD/CARTIA XT) 240 MG 24 hr capsule Take 1 capsule (240 mg) by mouth daily Taking Yes Maria Esther Dao, ELIZABETH CNP Yes    docusate sodium (COLACE) 100 MG capsule Take 100 mg by mouth 2 times daily as needed for constipation Taking Yes Unknown, Entered By History     empagliflozin (JARDIANCE) 10 MG TABS tablet Take 1 tablet (10 mg) by mouth daily for 360 days Taking Yes Brandt Garces MD  12/1/23   gabapentin (NEURONTIN) 100 MG capsule Take 1 capsule AM + 1 capsule afternoon + 2 capsules at bedtime  Patient taking differently: Take 100 mg by mouth 4 times daily [id number] Taking Yes Gala Stringer PA-C Yes    hydroxychloroquine (PLAQUENIL) 200 MG tablet TAKE 1 TABLET(200 MG) BY MOUTH DAILY  Patient taking differently: Take 200 mg by mouth every evening Taking Yes Maribell Domínguez MD No    loratadine (CLARITIN) 10 MG tablet Take 10 mg by mouth daily Taking Yes Reported, Patient     magnesium oxide 200 MG TABS Take 200 mg by mouth daily bedtime Taking Yes Reported, Patient     metolazone (ZAROXOLYN) 2.5 MG tablet Take 1 tablet (2.5 mg) by mouth as needed (ONLY take if instructed to do so by your cardiology team.) Taking Yes Elmira Vasquez APRN CNP Yes    multivitamin, therapeutic with minerals (THERA-VIT-M) TABS tablet Take 1 tablet by mouth daily Taking Yes Jamila Cordova MD     predniSONE (DELTASONE) 1 MG tablet Take 3 tablets (3 mg) by mouth daily - Oral Taking Yes Maribell Domínguez MD     predniSONE (DELTASONE) 5 MG tablet 10 mg when sick for 3 days or until back to baseline for secondary adrenal insufficiency Taking Yes Dima Shrestha MD     spironolactone (ALDACTONE) 25 MG tablet Take 0.5 tablets (12.5 mg)  by mouth daily Taking Yes Elmira Vasquez APRN CNP Yes    torsemide (DEMADEX) 20 MG tablet Take 1 tablet (20 mg) by mouth 2 times daily Taking Yes Elmira Vasquez APRN CNP Yes    Vitamin D (Cholecalciferol) 10 MCG (400 UNIT) TABS Take 1 tablet by mouth daily Taking Yes Unknown, Entered By History          Medication history completed by:   Dragan Acevedo, PharmD  [facility] Pharmacist  [phone]

## 2023-03-16 NOTE — PHARMACY - PREOPERATIVE ASSESSMENT CENTER
Anticoagulation Note - Preoperative Assessment Center (PAC) Pharmacist     Patient was interviewed on March 16, 2023 as a part of PAC clinic appointment. The purpose of this note is to document the perioperative anticoagulation plan outlined by the providers caring for Amelia Michel.     Current Regimen  Anticoagulation Regimen as of March 16, 2023: apixaban 5 mg BID  Indication: A-fib, A-flutter, CVA prophylaxis  Prescriber:  Halle JOHNSON, cardiology  Expected Duration of therapy: indefinite    Creatinine   Date Value Ref Range Status   03/06/2023 1.08 0.51 - 1.17 mg/dL Final     Comment:     Male and Female  0-2 Months    0.31-0.88 mg/dL  2-12 Months   0.16-0.39 mg/dL  1-2 Years     0.18-0.35 mg/dL  3-4 Years     0.26-0.42 mg/dL  5-6 Years     0.29-0.47 mg/dL  7-8 Years     0.34-0.53 mg/dL  9-10 Years    0.33-0.64 mg/dL  11-12 Years   0.44-0.68 mg/dL  13-14 Years   0.46-0.77 mg/dL    Female  15 Years and older  0.51-0.95 mg/dL    Male  15 Years and older  0.67-1.17 mg/dL       06/23/2021 1.05 (H) 0.52 - 1.04 mg/dL Final       Perioperative plan  Amelia Michel is scheduled for possible Replace valve tricuspid minimally invasive on 3/27/23 with Dr Cope, and the perioperative anticoagulation plan outlined by CVTS team is hold apixaban x3 days, last dose 3/23pm. Per CVTS team, pt will bridge with enoxaparin 40 mg SQ q12h:      Per CVTS team, pt also instructed to hold empagliflozin 10 mg daily x3 days, last dose 3/23.       Resumption of anticoagulation after procedure will be based on surgery team assessment of bleeding risks and complications.  This plan may require re-assessment and modification by Amelia Michel's primary team in the perioperative setting depending on patients clinical situation.        Dragan Acevedo, PharmD  PAC Pharmacist  [phone]   March 16, 2023  6:15 PM

## 2023-03-17 ENCOUNTER — ANCILLARY PROCEDURE (OUTPATIENT)
Dept: ULTRASOUND IMAGING | Facility: CLINIC | Age: 63
End: 2023-03-17
Attending: THORACIC SURGERY (CARDIOTHORACIC VASCULAR SURGERY)
Payer: COMMERCIAL

## 2023-03-17 ENCOUNTER — ANCILLARY PROCEDURE (OUTPATIENT)
Dept: GENERAL RADIOLOGY | Facility: CLINIC | Age: 63
End: 2023-03-17
Attending: THORACIC SURGERY (CARDIOTHORACIC VASCULAR SURGERY)
Payer: COMMERCIAL

## 2023-03-17 ENCOUNTER — LAB (OUTPATIENT)
Dept: LAB | Facility: CLINIC | Age: 63
End: 2023-03-17
Payer: COMMERCIAL

## 2023-03-17 ENCOUNTER — OFFICE VISIT (OUTPATIENT)
Dept: SURGERY | Facility: CLINIC | Age: 63
End: 2023-03-17
Payer: COMMERCIAL

## 2023-03-17 ENCOUNTER — ANESTHESIA EVENT (OUTPATIENT)
Dept: SURGERY | Facility: CLINIC | Age: 63
End: 2023-03-17
Payer: COMMERCIAL

## 2023-03-17 ENCOUNTER — PRE VISIT (OUTPATIENT)
Dept: SURGERY | Facility: CLINIC | Age: 63
End: 2023-03-17

## 2023-03-17 VITALS
TEMPERATURE: 97.7 F | BODY MASS INDEX: 22.65 KG/M2 | RESPIRATION RATE: 16 BRPM | WEIGHT: 105 LBS | OXYGEN SATURATION: 100 % | HEART RATE: 139 BPM | SYSTOLIC BLOOD PRESSURE: 121 MMHG | HEIGHT: 57 IN | DIASTOLIC BLOOD PRESSURE: 87 MMHG

## 2023-03-17 DIAGNOSIS — I07.1 TRICUSPID VALVE INSUFFICIENCY, UNSPECIFIED ETIOLOGY: ICD-10-CM

## 2023-03-17 DIAGNOSIS — Z01.818 PRE-OP EVALUATION: Primary | ICD-10-CM

## 2023-03-17 LAB
ALBUMIN SERPL BCG-MCNC: 4.5 G/DL (ref 3.5–5.2)
ALBUMIN UR-MCNC: NEGATIVE MG/DL
ALP SERPL-CCNC: 154 U/L (ref 35–129)
ALT SERPL W P-5'-P-CCNC: 27 U/L (ref 10–50)
ANION GAP SERPL CALCULATED.3IONS-SCNC: 12 MMOL/L (ref 7–15)
APPEARANCE UR: CLEAR
APTT PPP: 36 SECONDS (ref 22–38)
AST SERPL W P-5'-P-CCNC: 50 U/L (ref 10–50)
BILIRUB SERPL-MCNC: 1.6 MG/DL
BILIRUB UR QL STRIP: NEGATIVE
BUN SERPL-MCNC: 31.2 MG/DL (ref 8–23)
CALCIUM SERPL-MCNC: 10.3 MG/DL (ref 8.8–10.2)
CHLORIDE SERPL-SCNC: 90 MMOL/L (ref 98–107)
COLOR UR AUTO: ABNORMAL
CREAT SERPL-MCNC: 1.23 MG/DL (ref 0.51–1.17)
DEPRECATED HCO3 PLAS-SCNC: 33 MMOL/L (ref 22–29)
ERYTHROCYTE [DISTWIDTH] IN BLOOD BY AUTOMATED COUNT: 13.4 % (ref 10–15)
GFR SERPL CREATININE-BSD FRML MDRD: 49 ML/MIN/1.73M2
GLUCOSE SERPL-MCNC: 112 MG/DL (ref 70–99)
GLUCOSE UR STRIP-MCNC: 1000 MG/DL
HBA1C MFR BLD: 5.9 %
HCT VFR BLD AUTO: 40.7 % (ref 35–53)
HGB BLD-MCNC: 14.5 G/DL (ref 11.7–17.7)
HGB UR QL STRIP: ABNORMAL
INR PPP: 1.95 (ref 0.85–1.15)
KETONES UR STRIP-MCNC: NEGATIVE MG/DL
LEUKOCYTE ESTERASE UR QL STRIP: NEGATIVE
MAGNESIUM SERPL-MCNC: 2.8 MG/DL (ref 1.7–2.3)
MCH RBC QN AUTO: 36.2 PG (ref 26.5–33)
MCHC RBC AUTO-ENTMCNC: 35.6 G/DL (ref 31.5–36.5)
MCV RBC AUTO: 102 FL (ref 78–100)
NITRATE UR QL: NEGATIVE
PH UR STRIP: 6.5 [PH] (ref 5–7)
PLATELET # BLD AUTO: 125 10E3/UL (ref 150–450)
POTASSIUM SERPL-SCNC: 3.7 MMOL/L (ref 3.4–5.3)
PROT SERPL-MCNC: 8.1 G/DL (ref 6.4–8.3)
RBC # BLD AUTO: 4.01 10E6/UL (ref 3.8–5.9)
RBC URINE: 5 /HPF
SODIUM SERPL-SCNC: 135 MMOL/L (ref 136–145)
SP GR UR STRIP: 1.01 (ref 1–1.03)
UROBILINOGEN UR STRIP-MCNC: NORMAL MG/DL
WBC # BLD AUTO: 5.3 10E3/UL (ref 4–11)
WBC URINE: 2 /HPF

## 2023-03-17 PROCEDURE — 93971 EXTREMITY STUDY: CPT | Mod: RT | Performed by: RADIOLOGY

## 2023-03-17 PROCEDURE — 85610 PROTHROMBIN TIME: CPT | Performed by: PATHOLOGY

## 2023-03-17 PROCEDURE — 86850 RBC ANTIBODY SCREEN: CPT | Performed by: THORACIC SURGERY (CARDIOTHORACIC VASCULAR SURGERY)

## 2023-03-17 PROCEDURE — 83036 HEMOGLOBIN GLYCOSYLATED A1C: CPT | Performed by: THORACIC SURGERY (CARDIOTHORACIC VASCULAR SURGERY)

## 2023-03-17 PROCEDURE — 84134 ASSAY OF PREALBUMIN: CPT | Performed by: THORACIC SURGERY (CARDIOTHORACIC VASCULAR SURGERY)

## 2023-03-17 PROCEDURE — 99205 OFFICE O/P NEW HI 60 MIN: CPT | Performed by: NURSE PRACTITIONER

## 2023-03-17 PROCEDURE — 36415 COLL VENOUS BLD VENIPUNCTURE: CPT | Performed by: PATHOLOGY

## 2023-03-17 PROCEDURE — 83735 ASSAY OF MAGNESIUM: CPT | Performed by: PATHOLOGY

## 2023-03-17 PROCEDURE — 81001 URINALYSIS AUTO W/SCOPE: CPT | Performed by: PATHOLOGY

## 2023-03-17 PROCEDURE — 80053 COMPREHEN METABOLIC PANEL: CPT | Performed by: PATHOLOGY

## 2023-03-17 PROCEDURE — 85730 THROMBOPLASTIN TIME PARTIAL: CPT | Performed by: PATHOLOGY

## 2023-03-17 PROCEDURE — 93926 LOWER EXTREMITY STUDY: CPT | Mod: RT | Performed by: RADIOLOGY

## 2023-03-17 PROCEDURE — 71046 X-RAY EXAM CHEST 2 VIEWS: CPT | Mod: GC | Performed by: RADIOLOGY

## 2023-03-17 PROCEDURE — 85027 COMPLETE CBC AUTOMATED: CPT | Performed by: PATHOLOGY

## 2023-03-17 ASSESSMENT — ENCOUNTER SYMPTOMS: DYSRHYTHMIAS: 1

## 2023-03-17 ASSESSMENT — PAIN SCALES - GENERAL: PAINLEVEL: MODERATE PAIN (4)

## 2023-03-17 ASSESSMENT — NEW YORK HEART ASSOCIATION (NYHA) CLASSIFICATION: NYHA FUNCTIONAL CLASS: I

## 2023-03-17 ASSESSMENT — LIFESTYLE VARIABLES: TOBACCO_USE: 0

## 2023-03-17 NOTE — PATIENT INSTRUCTIONS
Preparing for Your Surgery      Name:  Amelia Michel   MRN:  [id number]   :  1960   Today's Date:  3/17/2023       Arriving for surgery:  Surgery date:  2023  Arrival time:  5:00 am     Surgeries and procedures: Adult patients can have 2 visitors all through the surgery process.     Visiting hours: 8 a.m. to 8:30 p.m.     Hospital: Adult patients and children under age 18 can have 4 visitor at a time     No visitors under the age of 5 are allowed for hospital patients.  Double occupancy rooms: Patients can have only two visitors at a time.     Patients with disabilities: Can have a support person with them (family member, service provider     Or someone well informed about their needs) plus the allowed number of visitors     Patients confirmed or suspected to have symptoms of COVID 19 or flu:     No visitors allowed for adult patients.   Children (under age 18) can have 1 named visitor.     People who are sick or showing symptoms of COVID 19 or flu:    Are not allowed to visit patients--we can only make exceptions in special situations.       Please follow these guidelines for your visit:   Arrive wearing a mask over your mouth and nose; we will give you a medical mask to wear    If you arrive wearing a cloth mask.   Keep it on during your entire visit, even when in patient's room.   If you don't wear a mask we'll ask you to leave.     Clean your hands with alcohol hand . Do this when you arrive at and leave the building and patient room,    And again after you touch your mask or anything in the room.     You can t visit if you have a fever, cough, shortness of breath, muscle aches, headaches, sore throat    Or diarrhea      Stay 6 feet away from others during your visit and between visits     Go directly to and from the room you are visiting.     Stay in the patient s room during your visit. Limit going to other places in the hospital as much as possible     Leave bags and jackets at home  or in the car.     For everyone s health, please don t come and go during your visit. That includes for smoking   during your visit.     Please come to:     [facility] [address]  [address] [place]  [address]    - ? parking is available in front of the hospital      -    Please proceed to [address] on the 3rd floor. [phone]?     - ?If you are in need of directions, wheelchair or escort please stop at the Information Desk in the lobby.        What can I eat or drink?  -  You may eat and drink normally up to 8 hours prior to arrival time. (Until 3/26/23 at 11 pm)  -  You may have clear liquids until 2 hours prior to arrival time. (Until 3 am)    Examples of clear liquids:  Water  Clear broth  Juices (apple, white grape, white cranberry  and cider) without pulp  Noncarbonated, powder based beverages  (lemonade and Sean-Aid)  Sodas (Sprite, 7-Up, ginger ale and seltzer)  Coffee or tea (without milk or cream)  Gatorade    -  No Alcohol for at least 24 hours before surgery.     Which medicines can I take?    Hold Aspirin for 7 days before surgery.   Hold Multivitamins for 7 days before surgery.  Hold Supplements for 7 days before surgery.  Hold Ibuprofen (Advil, Motrin) for  day(s) before surgery--unless otherwise directed by surgeon.  Hold Naproxen (Aleve) for 4 days before surgery.    Hold Jardiance for 3 days before procedure--take last dose on 3/23/23]      Plan for Apixaban (Eliquis) with lovenox bridge as follows:       Date Instructions   3/23 Take last dose of Eliquis in the evening.   3/24 Take Lovenox injections twice daily every 12 hours (morning and evening)   3/25 Take Lovenox injections twice daily every 12 hours (morning and evening)   3/26 Take Lovenox injection in the morning ONLY   3/27 DAY OF PROCEDURE           -  DO NOT take these medications the day of surgery:  Docusate  Loratadine  Metolazone   Spironolactone  Torsemide        -  PLEASE TAKE these medications the day of surgery:  Diltiazem   Gabapentin  Prednisone       How do I prepare myself?  - Please take 2 showers (one the night prior to surgery and one the morning of surgery) using Scrubcare or Hibiclens soap.    Use this soap only from the neck to your toes.     Leave the soap on your skin for one minute--then rinse thoroughly.      You may use your own shampoo and conditioner. No other hair products.   - Please remove all jewelry and body piercings.  - No lotions, deodorants or fragrance.  - No makeup or fingernail polish.   - Bring your ID and insurance card.    -If you have a Deep Brain Stimulator, Spinal Cord Stimulator, or any Neuro Stimulator device---you must bring the remote control to the hospital.      Covid testing policy as of 12/06/2022  Your surgeon will notify and schedule you for a COVID test if one is needed before surgery--please direct any questions or COVID symptoms to your surgeon      Questions or Concerns:    - For any questions regarding the day of surgery or your hospital stay, please contact the Pre Admission Nursing Office at [phone].       - If you have health changes between today and your surgery, please call your surgeon.     If you have questions about your medications, test results or have a change in your health status call Bre Jacome RN at [phone] during regular business hours.       - For questions after surgery, please call your surgeons office.

## 2023-03-17 NOTE — H&P
Pre-Operative H & P     CC:  Preoperative exam to assess for increased cardiopulmonary risk while undergoing surgery and anesthesia.    Date of Encounter: 3/17/2023  Primary Care Physician:  Gala Stringer     Reason for visit: Pre-operative evaluation    HPI  Amelia Michel is a 62 year old adult who presents for pre-operative H & P in preparation for  Procedure Information     Case: [id number] Date/Time: 03/27/23 0730    Procedures:       MINIMALLY INVASIVE TRICUSPID VALVE REPAIR ALL INDICATED PROCEDURES (Chest)      possible Replace valve tricuspid minimally invasive (Chest)    Anesthesia type: General    Diagnosis: Tricuspid regurgitation [I07.1]    Pre-op diagnosis: Tricuspid regurgitation [I07.1]    Location:  OR [address] [place] [address] OR    Providers: Chilango Cope MD          Amelia Michel is a 62 year old year old adult with PMHx of HFpEF, mildly dilated and reduced RV function, atrial flutter/fibrillation (on apixaban and rate control strategy), NSVT s/p PVC ablation (11/30/2022), cardiac arrest (2017 likely 2/2 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 She has met with Dr. Cope and presents today in preparation for the above procedure    History is obtained from the patient and chart review    Hx of abnormal bleeding or anti-platelet use: no    Menstrual history: No LMP recorded. 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     VSD (ventricular septal defect)        Past Surgical History  Past Surgical History:   Procedure Laterality Date     ANESTHESIA  CARDIOVERSION N/A 5/17/2017    Procedure: ANESTHESIA CARDIOVERSION;  ANESTHESIA CARDIOVERSION;  Surgeon: GENERIC ANESTHESIA PROVIDER;  Location: [facility] OR     ANESTHESIA CARDIOVERSION  3/12/2018    Procedure: ANESTHESIA CARDIOVERSION;;  Surgeon: GENERIC ANESTHESIA PROVIDER;  Location: [facility] OR     ANESTHESIA CARDIOVERSION N/A 3/23/2018    Procedure: ANESTHESIA CARDIOVERSION;  Anesthesia Cardioversion ;  Surgeon: GENERIC ANESTHESIA PROVIDER;  Location: [facility] OR     ANESTHESIA CARDIOVERSION N/A 4/12/2018    Procedure: ANESTHESIA CARDIOVERSION;  Cardioversion;  Surgeon: GENERIC ANESTHESIA PROVIDER;  Location: [facility] OR     ARTHRODESIS WRIST  2000    Right wrist     BONE MARROW ASPIRATE &BIOPSY  7/12/2017          BONE MARROW BIOPSY W/ ASPIRATION  07/12/2017     CARDIOVERSION      5/17/17, 3/12/18, 3/23/18, 4/14/18,      COLONOSCOPY N/A 11/19/2019    Procedure: Colonoscopy, With Polypectomy And Biopsy;  Surgeon: Pj Vasques MD;  Location: [facility]     CV CORONARY ANGIOGRAM N/A 11/28/2022    Procedure: Coronary Angiogram;  Surgeon: Sundar Wood MD;  Location:  HEART CARDIAC CATH LAB     CV RIGHT HEART CATH MEASUREMENTS RECORDED N/A 11/28/2022    Procedure: Right Heart Catheterization;  Surgeon: Sundar Wood MD;  Location:  HEART CARDIAC CATH LAB     EP ABLATION PVC N/A 12/1/2022    Procedure: Ablation Premature Ventricular Contractions;  Surgeon: Juan Eaton MD;  Location:  HEART CARDIAC CATH LAB     EP PACEMAKER N/A 12/13/2019    Procedure: EP Pacemaker;  Surgeon: Pj Trent MD;  Location:  HEART CARDIAC CATH LAB     EXCISE LESION UPPER EXTREMITY Left 5/22/2018    Procedure: EXCISE LESION UPPER EXTREMITY;  Left Arm Wound Excision And Closure, Possible Submuscular Transposition of Ulnar Nerve  (Choice Anesthesia);  Surgeon: Kevin Sheldon MD;  Location:  OR     FOOT SURGERY      4 left and 2 right     FOOT SURGERY      4 Left and 2 Right      IMPLANT PACEMAKER  12/13/2019    Dr China Trent   [facility]      IMPLANT PACEMAKER       RELEASE CARPAL TUNNEL Bilateral      RELEASE CARPAL TUNNEL BILATERAL       REPAIR VENTRICULAR SEPTAL DEFECT  1969     TRANSPOSITION ULNAR NERVE (ELBOW) Left 5/22/2018    Procedure: TRANSPOSITION ULNAR NERVE (ELBOW);;  Surgeon: Kevin Sheldon MD;  Location: [facility] OR     ULNAR NERVE TRANSPOSITION Left 05/22/2018     VSD REPAIR  1969     ZZC FUSION FOOT BONES,SUBTALAR Right 10/20/2020    Procedure: RIGHT SUBTALAR JOINT FUSION AND CALCANEOCUBOID FUSION;  Surgeon: Nemesio Crow MD;  Location: [facility];  Service: Orthopedics       Prior to Admission Medications  Current Outpatient Medications   Medication Sig Dispense Refill     acetaminophen (TYLENOL) 500 MG tablet Take 500 mg by mouth every 6 hours as needed for mild pain       alendronate (FOSAMAX) 70 MG tablet Take 1 tablet (70 mg) by mouth every 7 days (Patient taking differently: Take 70 mg by mouth every 7 days Mondays) 12 tablet 3     apixaban ANTICOAGULANT (ELIQUIS ANTICOAGULANT) 5 MG tablet Take 1 tablet (5 mg) by mouth 2 times daily 180 tablet 3     calcium carbonate 600 mg-vitamin D 400 units (CALTRATE) 600-400 MG-UNIT per tablet Take 2 tablets by mouth daily       dexamethasone (DECADRON) 4 MG/ML injection Inject 4 mg IM for adrenal crisis (Patient taking differently: Inject 4 mg as directed once as needed (adrenal crisis)) 2 mL 3     diltiazem ER COATED BEADS (CARDIZEM CD/CARTIA XT) 240 MG 24 hr capsule Take 1 capsule (240 mg) by mouth daily 90 capsule 1     docusate sodium (COLACE) 100 MG capsule Take 100 mg by mouth 2 times daily as needed for constipation       empagliflozin (JARDIANCE) 10 MG TABS tablet Take 1 tablet (10 mg) by mouth daily for 360 days 90 tablet 3     gabapentin (NEURONTIN) 100 MG capsule Take 1 capsule AM + 1 capsule afternoon + 2 capsules at bedtime (Patient taking differently: Take 100 mg by mouth 4 times daily [id number]) 360 capsule 1     hydroxychloroquine  (PLAQUENIL) 200 MG tablet TAKE 1 TABLET(200 MG) BY MOUTH DAILY (Patient taking differently: Take 200 mg by mouth every evening) 90 tablet 0     loratadine (CLARITIN) 10 MG tablet Take 10 mg by mouth daily       magnesium oxide 200 MG TABS Take 200 mg by mouth daily bedtime       metolazone (ZAROXOLYN) 2.5 MG tablet Take 1 tablet (2.5 mg) by mouth as needed (ONLY take if instructed to do so by your cardiology team.) 5 tablet 0     multivitamin, therapeutic with minerals (THERA-VIT-M) TABS tablet Take 1 tablet by mouth daily 30 each 0     predniSONE (DELTASONE) 1 MG tablet Take 3 tablets (3 mg) by mouth daily - Oral 270 tablet 1     predniSONE (DELTASONE) 5 MG tablet 10 mg when sick for 3 days or until back to baseline for secondary adrenal insufficiency 30 tablet 3     spironolactone (ALDACTONE) 25 MG tablet Take 0.5 tablets (12.5 mg) by mouth daily 45 tablet 1     torsemide (DEMADEX) 20 MG tablet Take 1 tablet (20 mg) by mouth 2 times daily 180 tablet 3     Vitamin D (Cholecalciferol) 10 MCG (400 UNIT) TABS Take 1 tablet by mouth daily         Allergies  Allergies   Allergen Reactions     Amiodarone Other (See Comments) and Difficulty breathing     Lethargic and had trouble breathing- occurred in 2017     Blood Transfusion Related (Informational Only) Hives     Hives with platelets. Give benadryl premedication.     Metoprolol Other (See Comments)     Pt and  report that metoprolol does not work for her and also reports feeling unwell with this medication. She has been able to tolerate atenolol, which as worked in controlling her HR.      Other [No Clinical Screening - See Comments]      Penicillin Allergy Skin Test not performed, see antimicrobial management team progress note 7/5/17.       Penicillins      Childhood reaction, concern for tongue swelling     Tape [Adhesive Tape] Rash       Social History  Social History     Socioeconomic History     Marital status:      Spouse name: Romeo Michel      Number of children: 0     Years of education: Not on file     Highest education level: Not on file   Occupational History     Employer: [employer]   Tobacco Use     Smoking status: Never     Smokeless tobacco: Never   Vaping Use     Vaping Use: Never used   Substance and Sexual Activity     Alcohol use: Not Currently     Comment: none     Drug use: No     Sexual activity: Not on file   Other Topics Concern     Parent/sibling w/ CABG, MI or angioplasty before 65F 55M? No   Social History Narrative     Not on file     Social Determinants of Health     Financial Resource Strain: Not on file   Food Insecurity: Not on file   Transportation Needs: Not on file   Physical Activity: Not on file   Stress: Not on file   Social Connections: Not on file   Intimate Partner Violence: Not At Risk     Fear of Current or Ex-Partner: No     Emotionally Abused: No     Physically Abused: No     Sexually Abused: No   Housing Stability: Not on file       Family History  Family History   Problem Relation Age of Onset     Breast Cancer Mother         60s     Hypertension Mother      Alzheimer Disease Mother      Cerebrovascular Disease Mother      Hypertension Father      Cerebrovascular Disease Father      Atrial fibrillation Father      Lymphoma Father      Prostate Cancer Father      Alzheimer Disease Maternal Grandmother         likely     Arthritis Maternal Grandfather      Pneumonia Maternal Grandfather      Diabetes Paternal Grandmother        Review of Systems  The complete review of systems is negative other than noted in the HPI or here.   Anesthesia Evaluation   Pt has had prior anesthetic. Type: General and MAC.    History of anesthetic complications  - PONV.  had nausea only with Cardioversions.    ROS/MED HX  ENT/Pulmonary:    (-) tobacco use and asthma   Neurologic:     (+) peripheral neuropathy, - numbness and tingling bilateral feet.     Cardiovascular: Comment: Hx of cardiac arrest ( VF arrest  6/2017)    (+) hypertension-----CHF Last EF: 55-60% date: 12/5/2022 NYHA classification: I. MONTALVO. pacemaker, Reason placed: SSS. type:  Medtronic [id number] Sandoval XT DR GALLEGOS, settings:  VVIR 75-130bpm, - Patientt is not dependent on pacemaker. dysrhythmias, a-fib and Other, valvular problems/murmurs Severe  TR. Previous cardiac testing   Echo: Date: 11/2022 Results:  Severe tricuspid insufficiency is present.Moderate right ventricular dilation is present. Global right ventricularfunction is mildly reduced.Global and regional left ventricular function is normal with an EF of 55-60%  Stress Test: Date: Results:    ECG Reviewed: Date: 3/17/2023 Results:  Atrial fibrillation   Cath:  Date: 11/2022 Results:   Right sided filling pressures are severely elevated.   Mild elevated pulmonary hypertension.   Left sided filling pressures are moderately elevated.Reduced cardiac output level.   Hemodynamic data has been modified in Epic per physician review.   Prox LAD lesion is 30% stenosed.     Mild non-obstructive coronary artery disease      METS/Exercise Tolerance:  Comment: METS < 3 would need to stop after 1 block due to neuropathy and MONTALVO   Hematologic: Comments:       (+) history of blood transfusion, previous transfusion reaction, - HIVES,  (-) history of blood clots   Musculoskeletal: Comment: RA- managed on plaquenil, Gabapentin, prednisone      GI/Hepatic: Comment: Denies dysphagia - neg GI/hepatic ROS     Renal/Genitourinary:     (+) renal disease, type: CRI,     Endo:     (+) thyroid problem,  Thyroid disease - Other Goiter, Chronic steroid usage for Other. Date most recently used: RA.     Psychiatric/Substance Use:  - neg psychiatric ROS  (-) alcohol abuse history   Infectious Disease:  - neg infectious disease ROS     Malignancy:   (+) Malignancy, History of Other.Other CA Hx DLBCL s/p CHOP in remission status post.    Other:            /87 (BP Location: Right arm, Patient Position: Sitting, Cuff Size: Adult  "Regular)   Pulse (!) 139   Temp 97.7  F (36.5  C) (Oral)   Resp 16   Ht 1.453 m (4' 9.2\")   Wt 47.6 kg (105 lb)   SpO2 100%   Breastfeeding No   BMI 22.56 kg/m      Physical Exam   Constitutional: Awake, alert, cooperative, no apparent distress, and appears stated age.  Eyes: Pupils equal, round and reactive to light, extra ocular muscles intact, sclera clear, conjunctiva normal.  HENT: Normocephalic, oral pharynx with moist mucus membranes, good dentition. No goiter appreciated.   Respiratory: Clear to auscultation bilaterally, no crackles or wheezing.  Cardiovascular: Regular rate and rhythm, normal S1 and S2, and 3/6 murmur throughout precordium.  Carotids +2, no bruits. No edema. Palpable pulses to radial  DP and PT arteries.   GI: Normal bowel sounds, soft, non-distended, non-tender, no masses palpated, no hepatosplenomegaly.  Surgical scars:   Lymph/Hematologic: No cervical lymphadenopathy and no supraclavicular lymphadenopathy.  Genitourinary:  deferred  Skin: Warm and dry.  No rashes at anticipated surgical site.   Musculoskeletal: Full ROM of neck. There is no redness, warmth, or swelling of the joints. Gross motor strength is normal.    Neurologic: Awake, alert, oriented to name, place and time. Cranial nerves II-XII are grossly intact. Gait is normal.   Neuropsychiatric: Calm, cooperative. Normal affect.     Prior Labs/Diagnostic Studies   All labs and imaging personally reviewed    Device Check 2/1/2023  Device: Medtronic [id number] Claudia XT  MRI  Normal device function.   Mode: VVIR 75-130bpm  : 3.3%  PVCs: Singles= 7.4/hr and Runs= 2.5/hr  Intrinsic Rhythm: AFL/VS ~130 bpm  Short V-V intervals: 0  Lead Trends Appear Stable.   Estimated battery longevity to RRT = 11 years. Battery voltage = 3.02 V.    Atrial Arrhythmia: AFL  Anticoagulant: Apixaban  Ventricular Arrhythmia: 63 ventricular episodes recorded. EGMs show irregular R-R intervals suggesting atrial origination.     EKG/ stress test - if " available please see in ROS above     11/19/2022  Echo result w/o MOPS: Interpretation SummarySevere tricuspid insufficiency is present.Moderate right ventricular dilation is present. Global right ventricularfunction is mildly reduced.Global and regional left ventricular function is normal with an EF of 55-60%.IVC diameter >2.1 cm collapsing <50% with sniff suggests a high RA pressureestimated at 15 mmHg or greater.No pericardial effusion is present.No significant changes noted.    Carotid US 11/2022                                                                   IMPRESSION:     1. RIGHT ICA: Less than 50% diameter narrowing by grayscale imaging  and sonographic velocity criteria.     2. LEFT ICA:  Less than 50% diameter narrowing by grayscale imaging  and sonographic velocity criteria.       No flowsheet data found.      The patient's records and results personally reviewed by this provider.     Outside records reviewed from: Care Everywhere    LAB/DIAGNOSTIC STUDIES TODAY:    Lab Results   Component Value Date    WBC 5.3 03/17/2023    WBC Canceled, Test credited 03/16/2021     Lab Results   Component Value Date    RBC 4.01 03/17/2023    RBC Canceled, Test credited 03/16/2021     Lab Results   Component Value Date    HGB 14.5 03/17/2023    HGB Canceled, Test credited 03/16/2021     Lab Results   Component Value Date    HCT 40.7 03/17/2023    HCT Canceled, Test credited 03/16/2021     No components found for: MCT  Lab Results   Component Value Date     03/17/2023    MCV Canceled, Test credited 03/16/2021     Lab Results   Component Value Date    MCH 36.2 03/17/2023    MCH Canceled, Test credited 03/16/2021     Lab Results   Component Value Date    MCHC 35.6 03/17/2023    MCHC Canceled, Test credited 03/16/2021     Lab Results   Component Value Date    RDW 13.4 03/17/2023    RDW Canceled, Test credited 03/16/2021     Lab Results   Component Value Date     03/17/2023    PLT Canceled, Test credited  03/16/2021     Last Comprehensive Metabolic Panel:  Lab Results   Component Value Date     (L) 03/17/2023    POTASSIUM 3.7 03/17/2023    CHLORIDE 90 (L) 03/17/2023    CO2 33 (H) 03/17/2023    ANIONGAP 12 03/17/2023     (H) 03/17/2023    BUN 31.2 (H) 03/17/2023    CR 1.23 (H) 03/17/2023    GFRESTIMATED 49 (L) 03/17/2023    VIKTORIYA 10.3 (H) 03/17/2023       Lab Results   Component Value Date    AST 50 03/17/2023    AST 36 06/23/2021     Lab Results   Component Value Date    ALT 27 03/17/2023    ALT 41 06/23/2021     Lab Results   Component Value Date    BILICONJ 0.0 12/06/2005      Lab Results   Component Value Date    BILITOTAL 1.6 03/17/2023    BILITOTAL 1.2 06/23/2021     Lab Results   Component Value Date    ALBUMIN 4.5 03/17/2023    ALBUMIN 4.4 04/20/2022    ALBUMIN 4.2 06/23/2021     Lab Results   Component Value Date    PROTTOTAL 8.1 03/17/2023    PROTTOTAL 7.5 06/23/2021      Lab Results   Component Value Date    ALKPHOS 154 03/17/2023    ALKPHOS 136 06/23/2021         Assessment      Amelia Michel is a 62 year old adult seen as a PAC referral for risk assessment and optimization for anesthesia.    Plan/Recommendations  Pt will be optimized for the proposed procedure.  See below for details on the assessment, risk, and preoperative recommendations    NEUROLOGY  - No history of TIA, CVA or seizure  - neuropathy- bilateral feet tingling and numbness  -Post Op delirium risk factors:  High co-morbid index    ENT  - No current airway concerns.  Will need to be reassessed day of surgery.  Mallampati: I  TM: > 3    CARDIAC  -HFpEF, mildly dilated and reduced RV function - recent heart failure exacerbation 1/24/2023  On torsemide 40 mg BID ( will hold morning dose of torsemide) Holding Jardiance starting 3/23  -severe TR - Procedure scheduled as above.   -Coronary angiogram - Mild non-obstructive coronary artery disease  -atrial flutter/fibrillation [id number]  (on apixaban and rate control)   - CVTS  "team is hold apixaban x3 days, last dose 3/23pm. 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     - METS (Metabolic Equivalents) METS < 3 unable to walk more than 1 block due to MONTALVO.     PULMONARY    PB Low Risk            Total Score: 2    PB: Hypertension    PB: Over 50 ys old      - Denies asthma or inhaler use  - Tobacco History      History   Smoking Status     Never   Smokeless Tobacco     Never       GI  Denies dysphagia  PONV High Risk  Total Score: 4           1 AN PONV: Pt is Female    1 AN PONV: Patient is not a current smoker    1 AN PONV: Patient has history of PONV    1 AN PONV: Intended Post Op Opioids        /RENAL  - Baseline Creatinine  1.00-1.2  CKD III    ENDOCRINE    - BMI: Estimated body mass index is 22.56 kg/m  as calculated from the following:    Height as of this encounter: 1.453 m (4' 9.2\").    Weight as of this encounter: 47.6 kg (105 lb).  Healthy Weight (BMI 18.5-24.9)  - No history of Diabetes Mellitus  - Goiter    HEME  VTE Low Risk 0.26%            Total Score: 1    VTE: Greater than 59 yrs old      - Coagulopathy second to Apixaban (Eliquis)  CVTS team is hold apixaban x3 days, last dose 3/23pm. Per CVTS team, pt will bridge with enoxaparin 40 mg SQ q12h:    GALILEA  RA- managed on plaquenil and prednisone    Patient is NOT Frail            Total Score: 0        - Malignancy - Hx DLBCL s/p CHOP in remission    - Other- Chronic hyponatremia  Baseline appears to be around 127-132    Different anesthesia methods/types have been discussed with the patient, but they are aware that the final plan will be decided by the assigned anesthesia provider on the date of service.  Patient was discussed with Dr Roberto    The patient is optimized for their " procedure. AVS with information on surgery time/arrival time, meds and NPO status given by nursing staff. No further diagnostic testing indicated.      On the day of service:     Prep time: 30 minutes  Visit time: 20 minutes  Documentation time: 15 minutes  ------------------------------------------  Total time: 65 minutes      ELIZABETH Leong CNP  Preoperative Assessment Center  [facility]  [facility]  Phone: [phone]  Fax: [phone]

## 2023-03-19 LAB
ATRIAL RATE - MUSE: NORMAL BPM
DIASTOLIC BLOOD PRESSURE - MUSE: NORMAL MMHG
INTERPRETATION ECG - MUSE: NORMAL
P AXIS - MUSE: NORMAL DEGREES
PR INTERVAL - MUSE: NORMAL MS
QRS DURATION - MUSE: 94 MS
QT - MUSE: 414 MS
QTC - MUSE: 511 MS
R AXIS - MUSE: -44 DEGREES
SYSTOLIC BLOOD PRESSURE - MUSE: NORMAL MMHG
T AXIS - MUSE: 78 DEGREES
VENTRICULAR RATE- MUSE: 92 BPM

## 2023-03-20 ENCOUNTER — TELEPHONE (OUTPATIENT)
Dept: FAMILY MEDICINE | Facility: CLINIC | Age: 63
End: 2023-03-20

## 2023-03-20 LAB — PREALB SERPL IA-MCNC: 17 MG/DL (ref 15–45)

## 2023-03-20 NOTE — TELEPHONE ENCOUNTER
Prior Authorization Retail Medication Request    Medication/Dose:   ICD code (if different than what is on RX):  Rheumatoid arthritis of both ankles, unspecified whether rheumatoid factor present (H) [M06.9]  Previously Tried and Failed:    Rationale:      Insurance Name:  [employer]  Insurance ID:  [id number]      Pharmacy Information (if different than what is on RX)  Name:  [facility]  Phone:  [phone]

## 2023-03-22 ENCOUNTER — HOSPITAL ENCOUNTER (OUTPATIENT)
Dept: PHYSICAL THERAPY | Facility: REHABILITATION | Age: 63
Discharge: HOME | End: 2023-03-22
Payer: COMMERCIAL

## 2023-03-22 DIAGNOSIS — M25.511 ACUTE PAIN OF RIGHT SHOULDER: ICD-10-CM

## 2023-03-22 DIAGNOSIS — M62.81 MUSCLE WEAKNESS (GENERALIZED): ICD-10-CM

## 2023-03-22 DIAGNOSIS — M62.81 GENERALIZED MUSCLE WEAKNESS: Primary | ICD-10-CM

## 2023-03-22 PROCEDURE — 97110 THERAPEUTIC EXERCISES: CPT | Mod: GP | Performed by: PHYSICAL THERAPY ASSISTANT

## 2023-03-22 PROCEDURE — 97140 MANUAL THERAPY 1/> REGIONS: CPT | Mod: GP | Performed by: PHYSICAL THERAPY ASSISTANT

## 2023-03-22 NOTE — TELEPHONE ENCOUNTER
Central Prior Authorization Team   Phone: [phone]    PA Initiation    Medication: Gabapentin  Insurance Company: [employer] - Phone [phone] Fax [phone]  Pharmacy Filling the Rx: [id number] [place], [place] - [address] AT [facility] [address]  Filling Pharmacy Phone: [phone]  Filling Pharmacy Fax:    Start Date: 3/22/2023

## 2023-03-22 NOTE — TELEPHONE ENCOUNTER
Prior Authorization Approval    Authorization Effective Date: 2/20/2023  Authorization Expiration Date: 3/21/2024  Medication: Gabapentin  Approved Dose/Quantity:    Reference #:     Insurance Company: [employer] - Phone [phone] Fax [phone]  Expected CoPay:       CoPay Card Available:      Foundation Assistance Needed:    Which Pharmacy is filling the prescription (Not needed for infusion/clinic administered): [id number] [place], [place] - [address] AT [facility] [address]  Pharmacy Notified: Yes  Patient Notified: Yes  **Instructed pharmacy to notify patient when script is ready to /ship.**

## 2023-03-24 ENCOUNTER — LAB (OUTPATIENT)
Dept: LAB | Facility: CLINIC | Age: 63
End: 2023-03-24
Attending: THORACIC SURGERY (CARDIOTHORACIC VASCULAR SURGERY)
Payer: COMMERCIAL

## 2023-03-24 DIAGNOSIS — I07.1 TRICUSPID VALVE INSUFFICIENCY, UNSPECIFIED ETIOLOGY: ICD-10-CM

## 2023-03-24 PROCEDURE — U0005 INFEC AGEN DETEC AMPLI PROBE: HCPCS

## 2023-03-24 PROCEDURE — U0003 INFECTIOUS AGENT DETECTION BY NUCLEIC ACID (DNA OR RNA); SEVERE ACUTE RESPIRATORY SYNDROME CORONAVIRUS 2 (SARS-COV-2) (CORONAVIRUS DISEASE [COVID-19]), AMPLIFIED PROBE TECHNIQUE, MAKING USE OF HIGH THROUGHPUT TECHNOLOGIES AS DESCRIBED BY CMS-2020-01-R: HCPCS

## 2023-03-25 DIAGNOSIS — M06.9 RHEUMATOID ARTHRITIS OF BOTH ANKLES, UNSPECIFIED WHETHER RHEUMATOID FACTOR PRESENT (H): Chronic | ICD-10-CM

## 2023-03-25 LAB — SARS-COV-2 RNA RESP QL NAA+PROBE: NEGATIVE

## 2023-03-26 ASSESSMENT — ENCOUNTER SYMPTOMS: DYSRHYTHMIAS: 1

## 2023-03-26 ASSESSMENT — NEW YORK HEART ASSOCIATION (NYHA) CLASSIFICATION: NYHA FUNCTIONAL CLASS: I

## 2023-03-26 ASSESSMENT — LIFESTYLE VARIABLES: TOBACCO_USE: 0

## 2023-03-26 NOTE — ANESTHESIA PREPROCEDURE EVALUATION
Pre-Operative H & P     CC:  Preoperative exam to assess for increased cardiopulmonary risk while undergoing surgery and anesthesia.    Date of Encounter: 3/17/2023  Primary Care Physician:  Gala Stringer     Reason for visit: Pre-operative evaluation    HPI  Amelia Michel is a 62 year old adult who presents for pre-operative H & P in preparation for  Procedure Information     Case: [id number] Date/Time: 03/27/23 0730    Procedures:       MINIMALLY INVASIVE TRICUSPID VALVE REPAIR ALL INDICATED PROCEDURES (Chest)      possible Replace valve tricuspid minimally invasive (Chest)    Anesthesia type: General with Block    Diagnosis: Tricuspid regurgitation [I07.1]    Pre-op diagnosis: Tricuspid regurgitation [I07.1]    Location:  OR [address] [place] [address] OR    Providers: Chilango Cope MD          Amelia Michel is a 62 year old year old adult with PMHx of HFpEF, mildly dilated and reduced RV function, atrial flutter/fibrillation (on apixaban and rate control strategy), NSVT s/p PVC ablation (11/30/2022), cardiac arrest (2017 likely 2/2 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 She has met with Dr. Cope and presents today in preparation for the above procedure    History is obtained from the patient and chart review    Hx of abnormal bleeding or anti-platelet use: no    Menstrual history: No LMP recorded. 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     VSD (ventricular septal defect)        Past Surgical History  Past Surgical History:   Procedure Laterality Date      ANESTHESIA CARDIOVERSION N/A 5/17/2017    Procedure: ANESTHESIA CARDIOVERSION;  ANESTHESIA CARDIOVERSION;  Surgeon: GENERIC ANESTHESIA PROVIDER;  Location: [facility] OR     ANESTHESIA CARDIOVERSION  3/12/2018    Procedure: ANESTHESIA CARDIOVERSION;;  Surgeon: GENERIC ANESTHESIA PROVIDER;  Location: [facility] OR     ANESTHESIA CARDIOVERSION N/A 3/23/2018    Procedure: ANESTHESIA CARDIOVERSION;  Anesthesia Cardioversion ;  Surgeon: GENERIC ANESTHESIA PROVIDER;  Location: [facility] OR     ANESTHESIA CARDIOVERSION N/A 4/12/2018    Procedure: ANESTHESIA CARDIOVERSION;  Cardioversion;  Surgeon: GENERIC ANESTHESIA PROVIDER;  Location: [facility] OR     ARTHRODESIS WRIST  2000    Right wrist     BONE MARROW ASPIRATE &BIOPSY  7/12/2017          BONE MARROW BIOPSY W/ ASPIRATION  07/12/2017     CARDIOVERSION      5/17/17, 3/12/18, 3/23/18, 4/14/18,      COLONOSCOPY N/A 11/19/2019    Procedure: Colonoscopy, With Polypectomy And Biopsy;  Surgeon: Pj Vasques MD;  Location: [facility]     CV CORONARY ANGIOGRAM N/A 11/28/2022    Procedure: Coronary Angiogram;  Surgeon: Sundar Wood MD;  Location:  HEART CARDIAC CATH LAB     CV RIGHT HEART CATH MEASUREMENTS RECORDED N/A 11/28/2022    Procedure: Right Heart Catheterization;  Surgeon: Sundar Wood MD;  Location:  HEART CARDIAC CATH LAB     EP ABLATION PVC N/A 12/1/2022    Procedure: Ablation Premature Ventricular Contractions;  Surgeon: Juan Eaton MD;  Location:  HEART CARDIAC CATH LAB     EP PACEMAKER N/A 12/13/2019    Procedure: EP Pacemaker;  Surgeon: Pj Trent MD;  Location:  HEART CARDIAC CATH LAB     EXCISE LESION UPPER EXTREMITY Left 5/22/2018    Procedure: EXCISE LESION UPPER EXTREMITY;  Left Arm Wound Excision And Closure, Possible Submuscular Transposition of Ulnar Nerve  (Choice Anesthesia);  Surgeon: Kevin Sheldon MD;  Location:  OR     FOOT SURGERY      4 left and 2 right     FOOT SURGERY      4 Left and 2 Right      IMPLANT PACEMAKER  12/13/2019      China Trent   [facility] lab      IMPLANT PACEMAKER       RELEASE CARPAL TUNNEL Bilateral      RELEASE CARPAL TUNNEL BILATERAL       REPAIR VENTRICULAR SEPTAL DEFECT  1969     TRANSPOSITION ULNAR NERVE (ELBOW) Left 5/22/2018    Procedure: TRANSPOSITION ULNAR NERVE (ELBOW);;  Surgeon: Kevin Sheldon MD;  Location: [facility] OR     ULNAR NERVE TRANSPOSITION Left 05/22/2018     VSD REPAIR  1969     ZZC FUSION FOOT BONES,SUBTALAR Right 10/20/2020    Procedure: RIGHT SUBTALAR JOINT FUSION AND CALCANEOCUBOID FUSION;  Surgeon: Nemesio Crow MD;  Location: [facility];  Service: Orthopedics       Prior to Admission Medications  Current Outpatient Medications   Medication Sig Dispense Refill     acetaminophen (TYLENOL) 500 MG tablet Take 500 mg by mouth every 6 hours as needed for mild pain       alendronate (FOSAMAX) 70 MG tablet Take 1 tablet (70 mg) by mouth every 7 days (Patient taking differently: Take 70 mg by mouth every 7 days Mondays) 12 tablet 3     apixaban ANTICOAGULANT (ELIQUIS ANTICOAGULANT) 5 MG tablet Take 1 tablet (5 mg) by mouth 2 times daily 180 tablet 3     calcium carbonate 600 mg-vitamin D 400 units (CALTRATE) 600-400 MG-UNIT per tablet Take 2 tablets by mouth daily       dexamethasone (DECADRON) 4 MG/ML injection Inject 4 mg IM for adrenal crisis (Patient taking differently: Inject 4 mg as directed once as needed (adrenal crisis)) 2 mL 3     diltiazem ER COATED BEADS (CARDIZEM CD/CARTIA XT) 240 MG 24 hr capsule Take 1 capsule (240 mg) by mouth daily 90 capsule 1     docusate sodium (COLACE) 100 MG capsule Take 100 mg by mouth 2 times daily as needed for constipation       empagliflozin (JARDIANCE) 10 MG TABS tablet Take 1 tablet (10 mg) by mouth daily for 360 days 90 tablet 3     gabapentin (NEURONTIN) 100 MG capsule Take 1 capsule AM + 1 capsule afternoon + 2 capsules at bedtime (Patient taking differently: Take 100 mg by mouth 4 times daily [id number]) 360 capsule 1      hydroxychloroquine (PLAQUENIL) 200 MG tablet TAKE 1 TABLET(200 MG) BY MOUTH DAILY (Patient taking differently: Take 200 mg by mouth every evening) 90 tablet 0     loratadine (CLARITIN) 10 MG tablet Take 10 mg by mouth daily       magnesium oxide 200 MG TABS Take 200 mg by mouth daily bedtime       metolazone (ZAROXOLYN) 2.5 MG tablet Take 1 tablet (2.5 mg) by mouth as needed (ONLY take if instructed to do so by your cardiology team.) 5 tablet 0     multivitamin, therapeutic with minerals (THERA-VIT-M) TABS tablet Take 1 tablet by mouth daily 30 each 0     predniSONE (DELTASONE) 1 MG tablet Take 3 tablets (3 mg) by mouth daily - Oral 270 tablet 1     predniSONE (DELTASONE) 5 MG tablet 10 mg when sick for 3 days or until back to baseline for secondary adrenal insufficiency 30 tablet 3     spironolactone (ALDACTONE) 25 MG tablet Take 0.5 tablets (12.5 mg) by mouth daily 45 tablet 1     torsemide (DEMADEX) 20 MG tablet Take 1 tablet (20 mg) by mouth 2 times daily 180 tablet 3     Vitamin D (Cholecalciferol) 10 MCG (400 UNIT) TABS Take 1 tablet by mouth daily         Allergies  Allergies   Allergen Reactions     Amiodarone Other (See Comments) and Difficulty breathing     Lethargic and had trouble breathing- occurred in 2017     Blood Transfusion Related (Informational Only) Hives     Hives with platelets. Give benadryl premedication.     Metoprolol Other (See Comments)     Pt and  report that metoprolol does not work for her and also reports feeling unwell with this medication. She has been able to tolerate atenolol, which as worked in controlling her HR.      Other [No Clinical Screening - See Comments]      Penicillin Allergy Skin Test not performed, see antimicrobial management team progress note 7/5/17.       Penicillins      Childhood reaction, concern for tongue swelling     Tape [Adhesive Tape] Rash       Social History  Social History     Socioeconomic History     Marital status:      Spouse name:  Romeo Michel     Number of children: 0     Years of education: Not on file     Highest education level: Not on file   Occupational History     Employer: [employer]   Tobacco Use     Smoking status: Never     Smokeless tobacco: Never   Vaping Use     Vaping Use: Never used   Substance and Sexual Activity     Alcohol use: Not Currently     Comment: none     Drug use: No     Sexual activity: Not on file   Other Topics Concern     Parent/sibling w/ CABG, MI or angioplasty before 65F 55M? No   Social History Narrative     Not on file     Social Determinants of Health     Financial Resource Strain: Not on file   Food Insecurity: Not on file   Transportation Needs: Not on file   Physical Activity: Not on file   Stress: Not on file   Social Connections: Not on file   Intimate Partner Violence: Not At Risk     Fear of Current or Ex-Partner: No     Emotionally Abused: No     Physically Abused: No     Sexually Abused: No   Housing Stability: Not on file       Family History  Family History   Problem Relation Age of Onset     Breast Cancer Mother         60s     Hypertension Mother      Alzheimer Disease Mother      Cerebrovascular Disease Mother      Hypertension Father      Cerebrovascular Disease Father      Atrial fibrillation Father      Lymphoma Father      Prostate Cancer Father      Alzheimer Disease Maternal Grandmother         likely     Arthritis Maternal Grandfather      Pneumonia Maternal Grandfather      Diabetes Paternal Grandmother        Review of Systems  The complete review of systems is negative other than noted in the HPI or here.   Anesthesia Evaluation   Pt has had prior anesthetic. Type: General and MAC.    History of anesthetic complications  - PONV.  had nausea only with Cardioversions.    ROS/MED HX  ENT/Pulmonary:    (-) tobacco use and asthma   Neurologic:     (+) peripheral neuropathy, - numbness and tingling bilateral feet.     Cardiovascular: Comment: Hx of cardiac arrest  ( VF arrest 6/2017)    (+) Dyslipidemia hypertension-----CHF Last EF: 55-60% date: 12/5/2022 NYHA classification: I. MONTALVO. pacemaker, Reason placed: SSS. type:  [employer]onic [id number] [place]ia XT DR GALLEGOS, settings:  VVIR 75-130bpm, - Patientt is not dependent on pacemaker. dysrhythmias, a-fib and Other, valvular problems/murmurs Severe  TR. Previous cardiac testing   Echo: Date: 11/2022 Results:  Severe tricuspid insufficiency is present.Moderate right ventricular dilation is present. Global right ventricularfunction is mildly reduced.Global and regional left ventricular function is normal with an EF of 55-60%  Stress Test: Date: Results:    ECG Reviewed: Date: 3/17/2023 Results:  Atrial fibrillation   Cath:  Date: 11/2022 Results:   Right sided filling pressures are severely elevated.   Mild elevated pulmonary hypertension.   Left sided filling pressures are moderately elevated.Reduced cardiac output level.   Hemodynamic data has been modified in Epic per physician review.   Prox LAD lesion is 30% stenosed.     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 There  is minimal retrolisthesis of C3 on C4. There is also a fusion anomaly  at C6-C7 with some focal kyphosis at C7-T1. These findings are similar  to that seen on the prior exam. There is moderate multilevel  degenerative disc and facet disease. There is no evidence for any  atlantoaxial subluxation  (+) arthritis,     GI/Hepatic: Comment: Denies dysphagia - neg GI/hepatic ROS     Renal/Genitourinary:     (+) renal disease, type: CRI,      Endo:     (+) thyroid problem,  Thyroid disease - Other Goiter, Chronic steroid usage for Other. Date most recently used: RA.     Psychiatric/Substance Use:  - neg psychiatric ROS  (-) alcohol abuse history   Infectious Disease:  - neg infectious disease ROS     Malignancy:   (+) Malignancy, History of Other.Other CA Hx DLBCL s/p CHOP in remission status post.    Other:            There were no vitals taken for this visit.    Physical Exam   Constitutional: Awake, alert, cooperative, no apparent distress, and appears stated age.  Eyes: Pupils equal, round and reactive to light, extra ocular muscles intact, sclera clear, conjunctiva normal.  HENT: Normocephalic, oral pharynx with moist mucus membranes, good dentition. No goiter appreciated.   Respiratory: Clear to auscultation bilaterally, no crackles or wheezing.  Cardiovascular: Regular rate and rhythm, normal S1 and S2, and 3/6 murmur throughout precordium.  Carotids +2, no bruits. No edema. Palpable pulses to radial  DP and PT arteries.   GI: Normal bowel sounds, soft, non-distended, non-tender, no masses palpated, no hepatosplenomegaly.  Surgical scars:   Lymph/Hematologic: No cervical lymphadenopathy and no supraclavicular lymphadenopathy.  Genitourinary:  deferred  Skin: Warm and dry.  No rashes at anticipated surgical site.   Musculoskeletal: Full ROM of neck. There is no redness, warmth, or swelling of the joints. Gross motor strength is normal.    Neurologic: Awake, alert, oriented to name, place and time. Cranial nerves II-XII are grossly intact. Gait is normal.   Neuropsychiatric: Calm, cooperative. Normal affect.     Prior Labs/Diagnostic Studies   All labs and imaging personally reviewed    Device Check 2/1/2023  Device: [employer] [id number] [place] XT  MRI  Normal device function.   Mode: VVIR 75-130bpm  : 3.3%  PVCs: Singles= 7.4/hr and Runs= 2.5/hr  Intrinsic Rhythm: AFL/VS ~130 bpm  Short V-V intervals: 0  Lead Trends Appear Stable.   Estimated  battery longevity to RRT = 11 years. Battery voltage = 3.02 V.    Atrial Arrhythmia: AFL  Anticoagulant: Apixaban  Ventricular Arrhythmia: 63 ventricular episodes recorded. EGMs show irregular R-R intervals suggesting atrial origination.     EKG/ stress test - if available please see in ROS above     11/19/2022  Echo result w/o MOPS: Interpretation SummarySevere tricuspid insufficiency is present.Moderate right ventricular dilation is present. Global right ventricularfunction is mildly reduced.Global and regional left ventricular function is normal with an EF of 55-60%.IVC diameter >2.1 cm collapsing <50% with sniff suggests a high RA pressureestimated at 15 mmHg or greater.No pericardial effusion is present.No significant changes noted.    Carotid US 11/2022                                                                   IMPRESSION:     1. RIGHT ICA: Less than 50% diameter narrowing by grayscale imaging  and sonographic velocity criteria.     2. LEFT ICA:  Less than 50% diameter narrowing by grayscale imaging  and sonographic velocity criteria.       No flowsheet data found.      The patient's records and results personally reviewed by this provider.     Outside records reviewed from: Care Everywhere    LAB/DIAGNOSTIC STUDIES TODAY:    Lab Results   Component Value Date    WBC 5.3 03/17/2023    WBC Canceled, Test credited 03/16/2021     Lab Results   Component Value Date    RBC 4.01 03/17/2023    RBC Canceled, Test credited 03/16/2021     Lab Results   Component Value Date    HGB 14.5 03/17/2023    HGB Canceled, Test credited 03/16/2021     Lab Results   Component Value Date    HCT 40.7 03/17/2023    HCT Canceled, Test credited 03/16/2021     No components found for: MCT  Lab Results   Component Value Date     03/17/2023    MCV Canceled, Test credited 03/16/2021     Lab Results   Component Value Date    MCH 36.2 03/17/2023    MCH Canceled, Test credited 03/16/2021     Lab Results   Component Value Date     MCHC 35.6 03/17/2023    MCHC Canceled, Test credited 03/16/2021     Lab Results   Component Value Date    RDW 13.4 03/17/2023    RDW Canceled, Test credited 03/16/2021     Lab Results   Component Value Date     03/17/2023    PLT Canceled, Test credited 03/16/2021     Last Comprehensive Metabolic Panel:  Lab Results   Component Value Date     (L) 03/17/2023    POTASSIUM 3.7 03/17/2023    CHLORIDE 90 (L) 03/17/2023    CO2 33 (H) 03/17/2023    ANIONGAP 12 03/17/2023     (H) 03/17/2023    BUN 31.2 (H) 03/17/2023    CR 1.23 (H) 03/17/2023    GFRESTIMATED 49 (L) 03/17/2023    VIKTORIYA 10.3 (H) 03/17/2023       Lab Results   Component Value Date    AST 50 03/17/2023    AST 36 06/23/2021     Lab Results   Component Value Date    ALT 27 03/17/2023    ALT 41 06/23/2021     Lab Results   Component Value Date    BILICONJ 0.0 12/06/2005      Lab Results   Component Value Date    BILITOTAL 1.6 03/17/2023    BILITOTAL 1.2 06/23/2021     Lab Results   Component Value Date    ALBUMIN 4.5 03/17/2023    ALBUMIN 4.4 04/20/2022    ALBUMIN 4.2 06/23/2021     Lab Results   Component Value Date    PROTTOTAL 8.1 03/17/2023    PROTTOTAL 7.5 06/23/2021      Lab Results   Component Value Date    ALKPHOS 154 03/17/2023    ALKPHOS 136 06/23/2021         Assessment      Amelia Michel is a 62 year old adult seen as a PAC referral for risk assessment and optimization for anesthesia.    Plan/Recommendations  Pt will be optimized for the proposed procedure.  See below for details on the assessment, risk, and preoperative recommendations    NEUROLOGY  - No history of TIA, CVA or seizure  - neuropathy- bilateral feet tingling and numbness  -Post Op delirium risk factors:  High co-morbid index    ENT  - No current airway concerns.  Will need to be reassessed day of surgery.  Mallampati: I  TM: > 3    CARDIAC  -HFpEF, mildly dilated and reduced RV function - recent heart failure exacerbation 1/24/2023  On torsemide 40 mg BID ( will hold  "morning dose of torsemide) Holding Jardiance starting 3/23  -severe TR - Procedure scheduled as above.   -Coronary angiogram - Mild non-obstructive coronary artery disease  -atrial flutter/fibrillation [id number] 2  (on apixaban and rate control)   - CVTS team is hold apixaban x3 days, last dose 3/23pm. 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     - METS (Metabolic Equivalents) METS < 3 unable to walk more than 1 block due to MONTALVO.     PULMONARY    PB Low Risk            Total Score: 2    PB: Hypertension    PB: Over 50 ys old      - Denies asthma or inhaler use  - Tobacco History      History   Smoking Status     Never   Smokeless Tobacco     Never       GI  Denies dysphagia  PONV High Risk  Total Score: 4           1 AN PONV: Pt is Female    1 AN PONV: Patient is not a current smoker    1 AN PONV: Patient has history of PONV    1 AN PONV: Intended Post Op Opioids        /RENAL  - Baseline Creatinine  1.00-1.2  CKD III    ENDOCRINE    - BMI: Estimated body mass index is 22.56 kg/m  as calculated from the following:    Height as of 3/17/23: 1.453 m (4' 9.2\").    Weight as of 3/17/23: 47.6 kg (105 lb).  Healthy Weight (BMI 18.5-24.9)  - No history of Diabetes Mellitus  - Goiter    HEME  VTE Low Risk 0.26%            Total Score: 1    VTE: Greater than 59 yrs old      - Coagulopathy second to Apixaban (Eliquis)  CVTS team is hold apixaban x3 days, last dose 3/23pm. Per CVTS team, pt will bridge with enoxaparin 40 mg SQ q12h:    MSK  RA- managed on plaquenil and prednisone    Patient is NOT Frail            Total Score: 0        - Malignancy - Hx DLBCL s/p CHOP in remission    - Other- Chronic hyponatremia  Baseline appears to be around 127-132    Different anesthesia " methods/types have been discussed with the patient, but they are aware that the final plan will be decided by the assigned anesthesia provider on the date of service.  Patient was discussed with Dr Roberto    The patient is optimized for their procedure. AVS with information on surgery time/arrival time, meds and NPO status given by nursing staff. No further diagnostic testing indicated.      On the day of service:     Prep time: 30 minutes  Visit time: 20 minutes  Documentation time: 15 minutes  ------------------------------------------  Total time: 65 minutes      ELIZABETH Leong CNP  Preoperative Assessment Center  [facility]  Phone: [phone]  Fax: [phone]    Physical Exam    Airway        Mallampati: II   TM distance: > 3 FB   Neck ROM: limited     Respiratory Devices and Support         Dental       (+) Completely normal teeth      Cardiovascular   cardiovascular exam normal       Rhythm and rate: normal   (+) murmur       Pulmonary   pulmonary exam normal        breath sounds clear to auscultation             Anesthesia Plan    ASA Status:  3   NPO Status:  NPO Appropriate    Anesthesia Type: General.     - Airway: ETT   Induction: Intravenous.   Maintenance: Balanced.   Techniques and Equipment:     - Lines/Monitors: Arterial Line, Central Line, PAC, CVP, BIS, NIRS, BRE            BRE Absolute Contra-indication: NONE     - Blood: T&S     - Drips/Meds: Norepi, Epinephrine, Nicardipine, Nitric Oxide     Consents    Anesthesia Plan(s) and associated risks, benefits, and realistic alternatives discussed. Questions answered and patient/representative(s) expressed understanding.    - Discussed:     - Discussed with:  Patient      - Extended Intubation/Ventilatory Support Discussed: Yes.      - Patient is DNR/DNI Status: No    Use of blood products discussed: Yes.     - Discussed with: Patient.     - Consented: consented to blood products            Reason  for refusal: other.     Postoperative Care    Pain management: IV analgesics, Oral pain medications, Multi-modal analgesia, Peripheral nerve block (Continuous).     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 She presents to the OR for mini thoracotomy TV repair.    Plan for GETA w/ arterial line, CVC, PAC and BRE.

## 2023-03-27 ENCOUNTER — HOSPITAL ENCOUNTER (OUTPATIENT)
Facility: CLINIC | Age: 63
Discharge: HOME | End: 2023-03-27
Attending: THORACIC SURGERY (CARDIOTHORACIC VASCULAR SURGERY) | Admitting: THORACIC SURGERY (CARDIOTHORACIC VASCULAR SURGERY)
Payer: COMMERCIAL

## 2023-03-27 ENCOUNTER — PATIENT OUTREACH (OUTPATIENT)
Dept: ONCOLOGY | Facility: CLINIC | Age: 63
End: 2023-03-27

## 2023-03-27 ENCOUNTER — ANCILLARY PROCEDURE (OUTPATIENT)
Dept: ULTRASOUND IMAGING | Facility: CLINIC | Age: 63
End: 2023-03-27
Payer: COMMERCIAL

## 2023-03-27 ENCOUNTER — ANESTHESIA (OUTPATIENT)
Dept: SURGERY | Facility: CLINIC | Age: 63
End: 2023-03-27
Payer: COMMERCIAL

## 2023-03-27 VITALS
TEMPERATURE: 98.1 F | WEIGHT: 107.14 LBS | DIASTOLIC BLOOD PRESSURE: 89 MMHG | HEIGHT: 57 IN | HEART RATE: 114 BPM | SYSTOLIC BLOOD PRESSURE: 125 MMHG | OXYGEN SATURATION: 99 % | BODY MASS INDEX: 23.12 KG/M2

## 2023-03-27 DIAGNOSIS — I07.1 SEVERE TRICUSPID REGURGITATION: Primary | ICD-10-CM

## 2023-03-27 LAB
ABO/RH(D): NORMAL
ANTIBODY SCREEN: NEGATIVE
APTT PPP: 32 SECONDS (ref 22–38)
BLD PROD TYP BPU: NORMAL
BLD PROD TYP BPU: NORMAL
BLOOD COMPONENT TYPE: NORMAL
BLOOD COMPONENT TYPE: NORMAL
CODING SYSTEM: NORMAL
CODING SYSTEM: NORMAL
CROSSMATCH: NORMAL
CROSSMATCH: NORMAL
GLUCOSE BLDC GLUCOMTR-MCNC: 116 MG/DL (ref 70–99)
INR PPP: 1.22 (ref 0.85–1.15)
SPECIMEN EXPIRATION DATE: NORMAL
UNIT ABO/RH: NORMAL
UNIT ABO/RH: NORMAL
UNIT NUMBER: NORMAL
UNIT NUMBER: NORMAL
UNIT STATUS: NORMAL
UNIT STATUS: NORMAL
UNIT TYPE ISBT: 9500
UNIT TYPE ISBT: 9500

## 2023-03-27 PROCEDURE — 36415 COLL VENOUS BLD VENIPUNCTURE: CPT | Performed by: THORACIC SURGERY (CARDIOTHORACIC VASCULAR SURGERY)

## 2023-03-27 PROCEDURE — 85610 PROTHROMBIN TIME: CPT | Performed by: THORACIC SURGERY (CARDIOTHORACIC VASCULAR SURGERY)

## 2023-03-27 PROCEDURE — 999N000001 HC CANCELLED SURGERY UP TO 15 MINS: Performed by: THORACIC SURGERY (CARDIOTHORACIC VASCULAR SURGERY)

## 2023-03-27 PROCEDURE — 999N000141 HC STATISTIC PRE-PROCEDURE NURSING ASSESSMENT: Performed by: THORACIC SURGERY (CARDIOTHORACIC VASCULAR SURGERY)

## 2023-03-27 PROCEDURE — 85730 THROMBOPLASTIN TIME PARTIAL: CPT | Performed by: THORACIC SURGERY (CARDIOTHORACIC VASCULAR SURGERY)

## 2023-03-27 PROCEDURE — 86850 RBC ANTIBODY SCREEN: CPT | Performed by: THORACIC SURGERY (CARDIOTHORACIC VASCULAR SURGERY)

## 2023-03-27 PROCEDURE — 86923 COMPATIBILITY TEST ELECTRIC: CPT | Performed by: THORACIC SURGERY (CARDIOTHORACIC VASCULAR SURGERY)

## 2023-03-27 PROCEDURE — 250N000013 HC RX MED GY IP 250 OP 250 PS 637: Performed by: THORACIC SURGERY (CARDIOTHORACIC VASCULAR SURGERY)

## 2023-03-27 RX ORDER — NALOXONE HYDROCHLORIDE 0.4 MG/ML
0.4 INJECTION, SOLUTION INTRAMUSCULAR; INTRAVENOUS; SUBCUTANEOUS
Status: DISCONTINUED | OUTPATIENT
Start: 2023-03-27 | End: 2023-03-27 | Stop reason: HOSPADM

## 2023-03-27 RX ORDER — LIDOCAINE 40 MG/G
CREAM TOPICAL
Status: DISCONTINUED | OUTPATIENT
Start: 2023-03-27 | End: 2023-03-27 | Stop reason: HOSPADM

## 2023-03-27 RX ORDER — NALOXONE HYDROCHLORIDE 0.4 MG/ML
0.2 INJECTION, SOLUTION INTRAMUSCULAR; INTRAVENOUS; SUBCUTANEOUS
Status: DISCONTINUED | OUTPATIENT
Start: 2023-03-27 | End: 2023-03-27 | Stop reason: HOSPADM

## 2023-03-27 RX ORDER — VANCOMYCIN HYDROCHLORIDE 1 G/200ML
1000 INJECTION, SOLUTION INTRAVENOUS
Status: DISCONTINUED | OUTPATIENT
Start: 2023-03-27 | End: 2023-03-27 | Stop reason: HOSPADM

## 2023-03-27 RX ORDER — CHLORHEXIDINE GLUCONATE ORAL RINSE 1.2 MG/ML
10 SOLUTION DENTAL ONCE
Status: DISCONTINUED | OUTPATIENT
Start: 2023-03-27 | End: 2023-03-27 | Stop reason: HOSPADM

## 2023-03-27 RX ORDER — ACETAMINOPHEN 325 MG/1
975 TABLET ORAL ONCE
Status: COMPLETED | OUTPATIENT
Start: 2023-03-27 | End: 2023-03-27

## 2023-03-27 RX ORDER — FENTANYL CITRATE 50 UG/ML
25-50 INJECTION, SOLUTION INTRAMUSCULAR; INTRAVENOUS
Status: DISCONTINUED | OUTPATIENT
Start: 2023-03-27 | End: 2023-03-27 | Stop reason: HOSPADM

## 2023-03-27 RX ORDER — FAMOTIDINE 20 MG/1
20 TABLET, FILM COATED ORAL
Status: COMPLETED | OUTPATIENT
Start: 2023-03-27 | End: 2023-03-27

## 2023-03-27 RX ORDER — GABAPENTIN 100 MG/1
100 CAPSULE ORAL
Status: DISCONTINUED | OUTPATIENT
Start: 2023-03-27 | End: 2023-03-27 | Stop reason: HOSPADM

## 2023-03-27 RX ORDER — PHENYLEPHRINE HCL IN 0.9% NACL 50MG/250ML
.1-6 PLASTIC BAG, INJECTION (ML) INTRAVENOUS CONTINUOUS
Status: DISCONTINUED | OUTPATIENT
Start: 2023-03-27 | End: 2023-03-27 | Stop reason: HOSPADM

## 2023-03-27 RX ORDER — FLUMAZENIL 0.1 MG/ML
0.2 INJECTION, SOLUTION INTRAVENOUS
Status: DISCONTINUED | OUTPATIENT
Start: 2023-03-27 | End: 2023-03-27 | Stop reason: HOSPADM

## 2023-03-27 RX ORDER — GABAPENTIN 100 MG/1
CAPSULE ORAL
Qty: 360 CAPSULE | Refills: 0 | Status: SHIPPED | OUTPATIENT
Start: 2023-03-27 | End: 2023-09-28

## 2023-03-27 RX ORDER — CLINDAMYCIN PHOSPHATE 900 MG/50ML
900 INJECTION, SOLUTION INTRAVENOUS
Status: DISCONTINUED | OUTPATIENT
Start: 2023-03-27 | End: 2023-03-27 | Stop reason: HOSPADM

## 2023-03-27 RX ORDER — DEXMEDETOMIDINE HYDROCHLORIDE 4 UG/ML
.1-1.2 INJECTION, SOLUTION INTRAVENOUS CONTINUOUS
Status: DISCONTINUED | OUTPATIENT
Start: 2023-03-27 | End: 2023-03-27 | Stop reason: HOSPADM

## 2023-03-27 RX ORDER — CLINDAMYCIN PHOSPHATE 900 MG/50ML
900 INJECTION, SOLUTION INTRAVENOUS SEE ADMIN INSTRUCTIONS
Status: DISCONTINUED | OUTPATIENT
Start: 2023-03-27 | End: 2023-03-27 | Stop reason: HOSPADM

## 2023-03-27 RX ADMIN — FAMOTIDINE 20 MG: 20 TABLET ORAL at 07:04

## 2023-03-27 RX ADMIN — ACETAMINOPHEN 650 MG: 325 TABLET ORAL at 07:03

## 2023-03-27 ASSESSMENT — ACTIVITIES OF DAILY LIVING (ADL): ADLS_ACUITY_SCORE: 20

## 2023-03-27 NOTE — OR NURSING
Dr parada hs been here confiring with patient and her spouse re: the possibility of insurance non coverage based on a letter the pt received on March 25, 2013.  Decision has been made to cancel. Work on insurance coverage and reschedule soon. OR control and anesthesia make aware of case cancel. IV removed with cannula tip intact and pt and  escorted from preop to lobby by me.

## 2023-03-27 NOTE — PROGRESS NOTES
Patient discharged on 3/26/23, please follow up per TCM workflow.    Arnav Rucker on 3/27/2023 at 9:30 AM

## 2023-03-28 ENCOUNTER — PREP FOR PROCEDURE (OUTPATIENT)
Dept: CARDIOLOGY | Facility: CLINIC | Age: 63
End: 2023-03-28
Payer: COMMERCIAL

## 2023-03-28 DIAGNOSIS — I07.1 TRICUSPID REGURGITATION: Primary | ICD-10-CM

## 2023-03-29 ENCOUNTER — TELEPHONE (OUTPATIENT)
Dept: CARDIOLOGY | Facility: CLINIC | Age: 63
End: 2023-03-29

## 2023-03-29 DIAGNOSIS — I07.1 TRICUSPID VALVE INSUFFICIENCY, UNSPECIFIED ETIOLOGY: Primary | ICD-10-CM

## 2023-03-29 RX ORDER — ENOXAPARIN SODIUM 100 MG/ML
0.75 INJECTION SUBCUTANEOUS 2 TIMES DAILY
Qty: 2.4 ML | Refills: 0 | Status: SHIPPED | OUTPATIENT
Start: 2023-03-29 | End: 2023-04-01

## 2023-03-31 ENCOUNTER — DOCUMENTATION ONLY (OUTPATIENT)
Dept: OTHER | Facility: CLINIC | Age: 63
End: 2023-03-31
Payer: COMMERCIAL

## 2023-04-03 ENCOUNTER — LAB (OUTPATIENT)
Dept: LAB | Facility: CLINIC | Age: 63
End: 2023-04-03
Attending: THORACIC SURGERY (CARDIOTHORACIC VASCULAR SURGERY)
Payer: COMMERCIAL

## 2023-04-03 DIAGNOSIS — I07.1 TRICUSPID VALVE INSUFFICIENCY, UNSPECIFIED ETIOLOGY: ICD-10-CM

## 2023-04-03 PROCEDURE — U0003 INFECTIOUS AGENT DETECTION BY NUCLEIC ACID (DNA OR RNA); SEVERE ACUTE RESPIRATORY SYNDROME CORONAVIRUS 2 (SARS-COV-2) (CORONAVIRUS DISEASE [COVID-19]), AMPLIFIED PROBE TECHNIQUE, MAKING USE OF HIGH THROUGHPUT TECHNOLOGIES AS DESCRIBED BY CMS-2020-01-R: HCPCS

## 2023-04-03 PROCEDURE — U0005 INFEC AGEN DETEC AMPLI PROBE: HCPCS

## 2023-04-04 ENCOUNTER — ANESTHESIA EVENT (OUTPATIENT)
Dept: SURGERY | Facility: CLINIC | Age: 63
DRG: 220 | End: 2023-04-04
Payer: COMMERCIAL

## 2023-04-04 LAB — SARS-COV-2 RNA RESP QL NAA+PROBE: NEGATIVE

## 2023-04-05 ENCOUNTER — TELEPHONE (OUTPATIENT)
Dept: CARDIOLOGY | Facility: CLINIC | Age: 63
End: 2023-04-05

## 2023-04-05 DIAGNOSIS — T38.0X5A STEROID-INDUCED OSTEOPOROSIS: ICD-10-CM

## 2023-04-05 DIAGNOSIS — M81.8 STEROID-INDUCED OSTEOPOROSIS: ICD-10-CM

## 2023-04-05 DIAGNOSIS — M05.79 RHEUMATOID ARTHRITIS INVOLVING MULTIPLE SITES WITH POSITIVE RHEUMATOID FACTOR (H): ICD-10-CM

## 2023-04-05 DIAGNOSIS — I48.92 ATRIAL FLUTTER, UNSPECIFIED TYPE (H): Primary | ICD-10-CM

## 2023-04-05 RX ORDER — ENOXAPARIN SODIUM 100 MG/ML
0.75 INJECTION SUBCUTANEOUS 2 TIMES DAILY
Qty: 1.6 ML | Refills: 0 | Status: SHIPPED | OUTPATIENT
Start: 2023-04-05 | End: 2023-04-07

## 2023-04-05 NOTE — TELEPHONE ENCOUNTER
Writer spoke with patient regarding rescheduling surgical date. Patient will be scheduled 4/10. OK to stay off of jaridance per cardiology team. 4 more injections of lovenox ordered to continue bridge. Will take last dose of lovenox 4/9 am. Verbalized understanding and had no further questions or concerns.

## 2023-04-07 ENCOUNTER — ANESTHESIA (OUTPATIENT)
Dept: SURGERY | Facility: CLINIC | Age: 63
DRG: 220 | End: 2023-04-07
Payer: COMMERCIAL

## 2023-04-09 ASSESSMENT — LIFESTYLE VARIABLES: TOBACCO_USE: 0

## 2023-04-09 ASSESSMENT — NEW YORK HEART ASSOCIATION (NYHA) CLASSIFICATION: NYHA FUNCTIONAL CLASS: I

## 2023-04-09 ASSESSMENT — ENCOUNTER SYMPTOMS: DYSRHYTHMIAS: 1

## 2023-04-09 NOTE — ANESTHESIA PREPROCEDURE EVALUATION
Pre-Operative H & P     CC:  Preoperative exam to assess for increased cardiopulmonary risk while undergoing surgery and anesthesia.    Date of Encounter: 3/17/2023  Primary Care Physician:  Gala Stringer     Reason for visit: Pre-operative evaluation    HPI  Amelia Michel is a 62 year old adult who presents for pre-operative H & P in preparation for  Procedure Information     Case: [id number] Date/Time: 04/10/23 0730    Procedures:       MINIMALLY INVASIVE TRICUSPID VALVE REPAIR AND ALL INDICATED PROCEDURES (Chest)      possible Replace valve tricuspid minimally invasive (Chest)    Anesthesia type: General with Block    Diagnosis: Tricuspid regurgitation [I07.1]    Pre-op diagnosis: Tricuspid regurgitation [I07.1]    Location:  OR [address] [place] [address] OR    Providers: Chilango Cope MD          Amelia Michel is a 62 year old year old adult with PMHx of HFpEF, mildly dilated and reduced RV function, atrial flutter/fibrillation (on apixaban and rate control strategy), NSVT s/p PVC ablation (11/30/2022), cardiac arrest (2017 likely 2/2 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 She has met with Dr. Cope and presents today in preparation for the above procedure    History is obtained from the patient and chart review    Hx of abnormal bleeding or anti-platelet use: no    Menstrual history: No LMP recorded. 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     VSD (ventricular septal defect)        Past Surgical History  Past  Surgical History:   Procedure Laterality Date     ANESTHESIA CARDIOVERSION N/A 5/17/2017    Procedure: ANESTHESIA CARDIOVERSION;  ANESTHESIA CARDIOVERSION;  Surgeon: GENERIC ANESTHESIA PROVIDER;  Location: [facility] OR     ANESTHESIA CARDIOVERSION  3/12/2018    Procedure: ANESTHESIA CARDIOVERSION;;  Surgeon: GENERIC ANESTHESIA PROVIDER;  Location: [facility] OR     ANESTHESIA CARDIOVERSION N/A 3/23/2018    Procedure: ANESTHESIA CARDIOVERSION;  Anesthesia Cardioversion ;  Surgeon: GENERIC ANESTHESIA PROVIDER;  Location: [facility] OR     ANESTHESIA CARDIOVERSION N/A 4/12/2018    Procedure: ANESTHESIA CARDIOVERSION;  Cardioversion;  Surgeon: GENERIC ANESTHESIA PROVIDER;  Location: [facility] OR     ARTHRODESIS WRIST  2000    Right wrist     BONE MARROW ASPIRATE &BIOPSY  7/12/2017          BONE MARROW BIOPSY W/ ASPIRATION  07/12/2017     CARDIOVERSION      5/17/17, 3/12/18, 3/23/18, 4/14/18,      COLONOSCOPY N/A 11/19/2019    Procedure: Colonoscopy, With Polypectomy And Biopsy;  Surgeon: Pj Vasques MD;  Location: [facility]     CV CORONARY ANGIOGRAM N/A 11/28/2022    Procedure: Coronary Angiogram;  Surgeon: Sundar Wood MD;  Location:  HEART CARDIAC CATH LAB     CV RIGHT HEART CATH MEASUREMENTS RECORDED N/A 11/28/2022    Procedure: Right Heart Catheterization;  Surgeon: Sundar Wood MD;  Location:  HEART CARDI[facility] CATH LAB     EP ABLATION PVC N/A 12/1/2022    Procedure: Ablation Premature Ventricular Contractions;  Surgeon: Juan Eaton MD;  Location:  HEART CARDIAC CATH LAB     EP PACEMAKER N/A 12/13/2019    Procedure: EP Pacemaker;  Surgeon: Pj Trent MD;  Location:  HEART CARDIAC CATH LAB     EXCISE LESION UPPER EXTREMITY Left 5/22/2018    Procedure: EXCISE LESION UPPER EXTREMITY;  Left Arm Wound Excision And Closure, Possible Submuscular Transposition of Ulnar Nerve  (Choice Anesthesia);  Surgeon: Kevin Sheldon MD;  Location:  OR     FOOT SURGERY      4 left and 2 right     FOOT SURGERY      4 Left and 2  Right      IMPLANT PACEMAKER  12/13/2019    Dr China Trent  [facility] Cardiac Cath lab      IMPLANT PACEMAKER       RELEASE CARPAL TUNNEL Bilateral      RELEASE CARPAL TUNNEL BILATERAL       REPAIR VENTRICULAR SEPTAL DEFECT  1969     TRANSPOSITION ULNAR NERVE (ELBOW) Left 5/22/2018    Procedure: TRANSPOSITION ULNAR NERVE (ELBOW);;  Surgeon: Kevin Sheldon MD;  Location: [facility] OR     ULNAR NERVE TRANSPOSITION Left 05/22/2018     VSD REPAIR  1969     ZZC FUSION FOOT BONES,SUBTALAR Right 10/20/2020    Procedure: RIGHT SUBTALAR JOINT FUSION AND CALCANEOCUBOID FUSION;  Surgeon: Nemesio Crow MD;  Location: [facility];  Service: Orthopedics       Prior to Admission Medications  Current Outpatient Medications   Medication Sig Dispense Refill     diltiazem ER COATED BEADS (CARDIZEM CD/CARTIA XT) 240 MG 24 hr capsule Take 1 capsule (240 mg) by mouth daily 90 capsule 1     gabapentin (NEURONTIN) 100 MG capsule TAKE 1 CAPSULE BY MOUTH IN THE MORNING, 1 CAPSULE BY MOUTH IN THE AFTERNOON, AND 2 CAPSULES BY MOUTH AT BEDTIME 360 capsule 0     hydroxychloroquine (PLAQUENIL) 200 MG tablet TAKE 1 TABLET(200 MG) BY MOUTH DAILY (Patient taking differently: Take 200 mg by mouth every evening) 90 tablet 0     loratadine (CLARITIN) 10 MG tablet Take 10 mg by mouth daily       magnesium oxide 200 MG TABS Take 200 mg by mouth daily bedtime       predniSONE (DELTASONE) 1 MG tablet Take 3 tablets (3 mg) by mouth daily - Oral 270 tablet 1     acetaminophen (TYLENOL) 500 MG tablet Take 500 mg by mouth every 6 hours as needed for mild pain       alendronate (FOSAMAX) 70 MG tablet Take 1 tablet (70 mg) by mouth every 7 days (Patient taking differently: Take 70 mg by mouth every 7 days Mondays) 12 tablet 3     apixaban ANTICOAGULANT (ELIQUIS ANTICOAGULANT) 5 MG tablet Take 1 tablet (5 mg) by mouth 2 times daily 180 tablet 3     calcium carbonate 600 mg-vitamin D 400 units (CALTRATE) 600-400 MG-UNIT per tablet Take 2 tablets by mouth daily        dexamethasone (DECADRON) 4 MG/ML injection Inject 4 mg IM for adrenal crisis (Patient taking differently: Inject 4 mg as directed once as needed (adrenal crisis)) 2 mL 3     docusate sodium (COLACE) 100 MG capsule Take 100 mg by mouth 2 times daily as needed for constipation       empagliflozin (JARDIANCE) 10 MG TABS tablet Take 1 tablet (10 mg) by mouth daily for 360 days 90 tablet 3     metolazone (ZAROXOLYN) 2.5 MG tablet Take 1 tablet (2.5 mg) by mouth as needed (ONLY take if instructed to do so by your cardiology team.) 5 tablet 0     multivitamin, therapeutic with minerals (THERA-VIT-M) TABS tablet Take 1 tablet by mouth daily 30 each 0     predniSONE (DELTASONE) 5 MG tablet 10 mg when sick for 3 days or until back to baseline for secondary adrenal insufficiency 30 tablet 3     spironolactone (ALDACTONE) 25 MG tablet Take 0.5 tablets (12.5 mg) by mouth daily 45 tablet 1     torsemide (DEMADEX) 20 MG tablet Take 1 tablet (20 mg) by mouth 2 times daily 180 tablet 3     Vitamin D (Cholecalciferol) 10 MCG (400 UNIT) TABS Take 1 tablet by mouth daily         Allergies  Allergies   Allergen Reactions     Amiodarone Other (See Comments) and Difficulty breathing     Lethargic and had trouble breathing- occurred in 2017     Blood Transfusion Related (Informational Only) Hives     Hives with platelets. Give benadryl premedication.     Metoprolol Other (See Comments)     Pt and  report that metoprolol does not work for her and also reports feeling unwell with this medication. She has been able to tolerate atenolol, which as worked in controlling her HR.      Other [No Clinical Screening - See Comments]      Penicillin Allergy Skin Test not performed, see antimicrobial management team progress note 7/5/17.       Penicillins      Childhood reaction, concern for tongue swelling     Tape [Adhesive Tape] Rash       Social History  Social History     Socioeconomic History     Marital status:      Spouse  name: Romeo Michel     Number of children: 0     Years of education: Not on file     Highest education level: Not on file   Occupational History     Employer: [employer]   Tobacco Use     Smoking status: Never     Smokeless tobacco: Never   Vaping Use     Vaping status: Never Used   Substance and Sexual Activity     Alcohol use: Not Currently     Comment: none     Drug use: No     Sexual activity: Not on file   Other Topics Concern     Parent/sibling w/ CABG, MI or angioplasty before 65F 55M? No   Social History Narrative     Not on file     Social Determinants of Health     Financial Resource Strain: Not on file   Food Insecurity: Not on file   Transportation Needs: Not on file   Physical Activity: Not on file   Stress: Not on file   Social Connections: Not on file   Intimate Partner Violence: Not At Risk (9/14/2022)    Humiliation, Afraid, Rape, and Kick questionnaire      Fear of Current or Ex-Partner: No      Emotionally Abused: No      Physically Abused: No      Sexually Abused: No   Housing Stability: Not on file       Family History  Family History   Problem Relation Age of Onset     Breast Cancer Mother         60s     Hypertension Mother      Alzheimer Disease Mother      Cerebrovascular Disease Mother      Hypertension Father      Cerebrovascular Disease Father      Atrial fibrillation Father      Lymphoma Father      Prostate Cancer Father      Alzheimer Disease Maternal Grandmother         likely     Arthritis Maternal Grandfather      Pneumonia Maternal Grandfather      Diabetes Paternal Grandmother        Review of Systems  The complete review of systems is negative other than noted in the HPI or here.   Anesthesia Evaluation   Pt has had prior anesthetic. Type: General and MAC.    History of anesthetic complications  - PONV.  had nausea only with Cardioversions.    ROS/MED HX  ENT/Pulmonary:    (-) tobacco use and asthma   Neurologic:     (+) peripheral neuropathy, - numbness  and tingling bilateral feet.     Cardiovascular: Comment: Hx of cardiac arrest ( VF arrest 6/2017)    (+) Dyslipidemia hypertension-----CHF Last EF: 55-60% date: 12/5/2022 NYHA classification: I. MONTALVO. pacemaker, Reason placed: SSS. type:  Medtronic [id number] [place] XT DR GALLEGOS, settings:  VVIR 75-130bpm, - Patientt is not dependent on pacemaker. dysrhythmias, a-fib and Other, valvular problems/murmurs Severe  TR. Previous cardiac testing   Echo: Date: 11/2022 Results:  Severe tricuspid insufficiency is present.Moderate right ventricular dilation is present. Global right ventricularfunction is mildly reduced.Global and regional left ventricular function is normal with an EF of 55-60%  Stress Test: Date: Results:    ECG Reviewed: Date: 3/17/2023 Results:  Atrial fibrillation   Cath:  Date: 11/2022 Results:   Right sided filling pressures are severely elevated.   Mild elevated pulmonary hypertension.   Left sided filling pressures are moderately elevated.Reduced cardiac output level.   Hemodynamic data has been modified in Epic per physician review.   Prox LAD lesion is 30% stenosed.     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 There  is minimal retrolisthesis of C3 on C4. There is also a fusion anomaly  at C6-C7 with some focal kyphosis at C7-T1. These findings are similar  to that seen on the prior exam. There is moderate multilevel  degenerative disc and facet disease. There is no evidence for any  atlantoaxial subluxation  (+) arthritis,     GI/Hepatic: Comment: Denies dysphagia - neg  GI/hepatic ROS     Renal/Genitourinary:     (+) renal disease, type: CRI,     Endo:     (+) thyroid problem,  Thyroid disease - Other Goiter, Chronic steroid usage for Other. Date most recently used: RA.     Psychiatric/Substance Use:  - neg psychiatric ROS  (-) alcohol abuse history   Infectious Disease:  - neg infectious disease ROS     Malignancy:   (+) Malignancy, History of Other.Other CA Hx DLBCL s/p CHOP in remission status post.    Other:            There were no vitals taken for this visit.    Physical Exam   Constitutional: Awake, alert, cooperative, no apparent distress, and appears stated age.  Eyes: Pupils equal, round and reactive to light, extra ocular muscles intact, sclera clear, conjunctiva normal.  HENT: Normocephalic, oral pharynx with moist mucus membranes, good dentition. No goiter appreciated.   Respiratory: Clear to auscultation bilaterally, no crackles or wheezing.  Cardiovascular: Regular rate and rhythm, normal S1 and S2, and 3/6 murmur throughout precordium.  Carotids +2, no bruits. No edema. Palpable pulses to radial  DP and PT arteries.   GI: Normal bowel sounds, soft, non-distended, non-tender, no masses palpated, no hepatosplenomegaly.  Surgical scars:   Lymph/Hematologic: No cervical lymphadenopathy and no supraclavicular lymphadenopathy.  Genitourinary:  deferred  Skin: Warm and dry.  No rashes at anticipated surgical site.   Musculoskeletal: Full ROM of neck. There is no redness, warmth, or swelling of the joints. Gross motor strength is normal.    Neurologic: Awake, alert, oriented to name, place and time. Cranial nerves II-XII are grossly intact. Gait is normal.   Neuropsychiatric: Calm, cooperative. Normal affect.     Prior Labs/Diagnostic Studies   All labs and imaging personally reviewed    Device Check 2/1/2023  Device: [employer] [id number] [place] XT DR GALLEGOS  Normal device function.   Mode: VVIR 75-130bpm  : 3.3%  PVCs: Singles= 7.4/hr and Runs= 2.5/hr  Intrinsic Rhythm: AFL/VS  ~130 bpm  Short V-V intervals: 0  Lead Trends Appear Stable.   Estimated battery longevity to RRT = 11 years. Battery voltage = 3.02 V.    Atrial Arrhythmia: AFL  Anticoagulant: Apixaban  Ventricular Arrhythmia: 63 ventricular episodes recorded. EGMs show irregular R-R intervals suggesting atrial origination.     EKG/ stress test - if available please see in ROS above     11/19/2022  Echo result w/o MOPS: Interpretation SummarySevere tricuspid insufficiency is present.Moderate right ventricular dilation is present. Global right ventricularfunction is mildly reduced.Global and regional left ventricular function is normal with an EF of 55-60%.IVC diameter >2.1 cm collapsing <50% with sniff suggests a high RA pressureestimated at 15 mmHg or greater.No pericardial effusion is present.No significant changes noted.    Carotid US 11/2022                                                                   IMPRESSION:     1. RIGHT ICA: Less than 50% diameter narrowing by grayscale imaging  and sonographic velocity criteria.     2. LEFT ICA:  Less than 50% diameter narrowing by grayscale imaging  and sonographic velocity criteria.            View : No data to display.                  The patient's records and results personally reviewed by this provider.     Outside records reviewed from: Care Everywhere    LAB/DIAGNOSTIC STUDIES TODAY:    Lab Results   Component Value Date    WBC 5.3 03/17/2023    WBC Canceled, Test credited 03/16/2021     Lab Results   Component Value Date    RBC 4.01 03/17/2023    RBC Canceled, Test credited 03/16/2021     Lab Results   Component Value Date    HGB 14.5 03/17/2023    HGB Canceled, Test credited 03/16/2021     Lab Results   Component Value Date    HCT 40.7 03/17/2023    HCT Canceled, Test credited 03/16/2021     No components found for: MCT  Lab Results   Component Value Date     03/17/2023    MCV Canceled, Test credited 03/16/2021     Lab Results   Component Value Date    MCH 36.2  03/17/2023    MCH Canceled, Test credited 03/16/2021     Lab Results   Component Value Date    MCHC 35.6 03/17/2023    MCHC Canceled, Test credited 03/16/2021     Lab Results   Component Value Date    RDW 13.4 03/17/2023    RDW Canceled, Test credited 03/16/2021     Lab Results   Component Value Date     03/17/2023    PLT Canceled, Test credited 03/16/2021     Last Comprehensive Metabolic Panel:  Lab Results   Component Value Date     (L) 03/17/2023    POTASSIUM 3.7 03/17/2023    CHLORIDE 90 (L) 03/17/2023    CO2 33 (H) 03/17/2023    ANIONGAP 12 03/17/2023     (H) 03/27/2023    BUN 31.2 (H) 03/17/2023    CR 1.23 (H) 03/17/2023    GFRESTIMATED 49 (L) 03/17/2023    VIKTORIYA 10.3 (H) 03/17/2023       Lab Results   Component Value Date    AST 50 03/17/2023    AST 36 06/23/2021     Lab Results   Component Value Date    ALT 27 03/17/2023    ALT 41 06/23/2021     Lab Results   Component Value Date    BILICONJ 0.0 12/06/2005      Lab Results   Component Value Date    BILITOTAL 1.6 03/17/2023    BILITOTAL 1.2 06/23/2021     Lab Results   Component Value Date    ALBUMIN 4.5 03/17/2023    ALBUMIN 4.4 04/20/2022    ALBUMIN 4.2 06/23/2021     Lab Results   Component Value Date    PROTTOTAL 8.1 03/17/2023    PROTTOTAL 7.5 06/23/2021      Lab Results   Component Value Date    ALKPHOS 154 03/17/2023    ALKPHOS 136 06/23/2021         Assessment      Amelia Michel is a 62 year old adult seen as a PAC referral for risk assessment and optimization for anesthesia.    Plan/Recommendations  Pt will be optimized for the proposed procedure.  See below for details on the assessment, risk, and preoperative recommendations    NEUROLOGY  - No history of TIA, CVA or seizure  - neuropathy- bilateral feet tingling and numbness  -Post Op delirium risk factors:  High co-morbid index    ENT  - No current airway concerns.  Will need to be reassessed day of surgery.  Mallampati: I  TM: > 3    CARDIAC  -HFpEF, mildly dilated and  "reduced RV function - recent heart failure exacerbation 1/24/2023  On torsemide 40 mg BID ( will hold morning dose of torsemide) Holding Jardiance starting 3/23  -severe TR - Procedure scheduled as above.   -Coronary angiogram - Mild non-obstructive coronary artery disease  -atrial flutter/fibrillation [id number]  (on apixaban and rate control)   - CVTS team is hold apixaban x3 days, last dose 3/23pm. 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     - METS (Metabolic Equivalents) METS < 3 unable to walk more than 1 block due to MONTALVO.     PULMONARY    PB Low Risk            Total Score: 2    PB: Hypertension    PB: Over 50 ys old      - Denies asthma or inhaler use  - Tobacco History      History   Smoking Status     Never   Smokeless Tobacco     Never       GI  Denies dysphagia  PONV High Risk  Total Score: 4           1 AN PONV: Pt is Female    1 AN PONV: Patient is not a current smoker    1 AN PONV: Patient has history of PONV    1 AN PONV: Intended Post Op Opioids        /RENAL  - Baseline Creatinine  1.00-1.2  CKD III    ENDOCRINE    - BMI: Estimated body mass index is 23.19 kg/m  as calculated from the following:    Height as of 3/27/23: 1.448 m (4' 9\").    Weight as of 3/27/23: 48.6 kg (107 lb 2.3 oz).  Healthy Weight (BMI 18.5-24.9)  - No history of Diabetes Mellitus  - Goiter    HEME  VTE Low Risk 0.26%            Total Score: 1    VTE: Greater than 59 yrs old      - Coagulopathy second to Apixaban (Eliquis)  CVTS team is hold apixaban x3 days, last dose 3/23pm. Per CVTS team, pt will bridge with enoxaparin 40 mg SQ q12h:    MSK  RA- managed on plaquenil and prednisone    Patient is NOT Frail            Total Score: 0        - Malignancy - Hx DLBCL s/p CHOP in " remission    - Other- Chronic hyponatremia  Baseline appears to be around 127-132    Different anesthesia methods/types have been discussed with the patient, but they are aware that the final plan will be decided by the assigned anesthesia provider on the date of service.  Patient was discussed with Dr Roberto    The patient is optimized for their procedure. AVS with information on surgery time/arrival time, meds and NPO status given by nursing staff. No further diagnostic testing indicated.      On the day of service:     Prep time: 30 minutes  Visit time: 20 minutes  Documentation time: 15 minutes  ------------------------------------------  Total time: 65 minutes      ELIZABETH Leong CNP  Preoperative Assessment Center  [facility]  Phone: [phone]  Fax: [phone]    Physical Exam    Airway        Mallampati: II   TM distance: > 3 FB   Neck ROM: limited     Respiratory Devices and Support         Dental       (+) Completely normal teeth      Cardiovascular   cardiovascular exam normal       Rhythm and rate: normal   (+) murmur       Pulmonary   pulmonary exam normal        breath sounds clear to auscultation             Anesthesia Plan    ASA Status:  4   NPO Status:  NPO Appropriate    Anesthesia Type: General.     - Airway: ETT   Induction: Intravenous.   Maintenance: Balanced.   Techniques and Equipment:     - Lines/Monitors: Arterial Line, Central Line, CVP, BIS, NIRS, BRE            BRE Absolute Contra-indication: NONE            BRE Relative Contra-indication: NONE     - Blood: Blood in Room, Cell Saver     - Drips/Meds: Norepi, Epinephrine, Nitric Oxide     Consents    Anesthesia Plan(s) and associated risks, benefits, and realistic alternatives discussed. Questions answered and patient/representative(s) expressed understanding.    - Discussed:     - Discussed with:  Patient      - Extended Intubation/Ventilatory Support Discussed: Yes.      - Patient  is DNR/DNI Status: No    Use of blood products discussed: Yes.     - Discussed with: Patient.     - Consented: consented to blood products            Reason for refusal: other.     Postoperative Care    Pain management: Multi-modal analgesia.   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 She presents to the OR for mini thoracotomy TV repair.    Plan for GETA w/ arterial line, CVC and BRE.

## 2023-04-10 ENCOUNTER — MEDICAL CORRESPONDENCE (OUTPATIENT)
Dept: CARDIOLOGY | Facility: CLINIC | Age: 63
End: 2023-04-10

## 2023-04-10 ENCOUNTER — APPOINTMENT (OUTPATIENT)
Dept: GENERAL RADIOLOGY | Facility: CLINIC | Age: 63
DRG: 220 | End: 2023-04-10
Attending: THORACIC SURGERY (CARDIOTHORACIC VASCULAR SURGERY)
Payer: COMMERCIAL

## 2023-04-10 ENCOUNTER — HOSPITAL ENCOUNTER (INPATIENT)
Facility: CLINIC | Age: 63
LOS: 7 days | Discharge: HOME | DRG: 220 | End: 2023-04-17
Attending: THORACIC SURGERY (CARDIOTHORACIC VASCULAR SURGERY) | Admitting: THORACIC SURGERY (CARDIOTHORACIC VASCULAR SURGERY)
Payer: COMMERCIAL

## 2023-04-10 DIAGNOSIS — Z95.4 S/P TVR (TRICUSPID VALVE REPLACEMENT): Primary | ICD-10-CM

## 2023-04-10 DIAGNOSIS — I07.1 SEVERE TRICUSPID REGURGITATION: ICD-10-CM

## 2023-04-10 LAB
ABO/RH(D): NORMAL
ALBUMIN SERPL BCG-MCNC: 3.4 G/DL (ref 3.5–5.2)
ALLEN'S TEST: ABNORMAL
ALP SERPL-CCNC: 83 U/L (ref 35–129)
ALT SERPL W P-5'-P-CCNC: 42 U/L (ref 10–50)
ANION GAP SERPL CALCULATED.3IONS-SCNC: 13 MMOL/L (ref 7–15)
ANION GAP SERPL CALCULATED.3IONS-SCNC: 17 MMOL/L (ref 7–15)
ANTIBODY SCREEN: NEGATIVE
APTT PPP: 29 SECONDS (ref 22–38)
APTT PPP: 35 SECONDS (ref 22–38)
APTT PPP: 61 SECONDS (ref 22–38)
AST SERPL W P-5'-P-CCNC: 105 U/L (ref 10–50)
BASE EXCESS BLDA CALC-SCNC: -0.1 MMOL/L (ref -9.6–2)
BASE EXCESS BLDA CALC-SCNC: 1.4 MMOL/L (ref -9–1.8)
BASE EXCESS BLDA CALC-SCNC: 2.1 MMOL/L (ref -9–1.8)
BASE EXCESS BLDA CALC-SCNC: 2.7 MMOL/L (ref -9.6–2)
BASE EXCESS BLDA CALC-SCNC: 2.9 MMOL/L (ref -9.6–2)
BASE EXCESS BLDA CALC-SCNC: 3.1 MMOL/L (ref -9.6–2)
BASE EXCESS BLDA CALC-SCNC: 4 MMOL/L (ref -9.6–2)
BASE EXCESS BLDA CALC-SCNC: 4.2 MMOL/L (ref -9–1.8)
BASE EXCESS BLDA CALC-SCNC: 4.4 MMOL/L (ref -9.6–2)
BASE EXCESS BLDA CALC-SCNC: 5.7 MMOL/L (ref -9.6–2)
BASE EXCESS BLDV CALC-SCNC: 4.7 MMOL/L (ref -8.1–1.9)
BASOPHILS # BLD AUTO: 0 10E3/UL (ref 0–0.2)
BASOPHILS NFR BLD AUTO: 1 %
BILIRUB SERPL-MCNC: 1.9 MG/DL
BLD PROD TYP BPU: NORMAL
BLOOD COMPONENT TYPE: NORMAL
BUN SERPL-MCNC: 24.7 MG/DL (ref 8–23)
BUN SERPL-MCNC: 27.9 MG/DL (ref 8–23)
CA-I BLD-MCNC: 4 MG/DL (ref 4.4–5.2)
CA-I BLD-MCNC: 4 MG/DL (ref 4.4–5.2)
CA-I BLD-MCNC: 4.2 MG/DL (ref 4.4–5.2)
CA-I BLD-MCNC: 4.2 MG/DL (ref 4.4–5.2)
CA-I BLD-MCNC: 4.3 MG/DL (ref 4.4–5.2)
CA-I BLD-MCNC: 4.5 MG/DL (ref 4.4–5.2)
CA-I BLD-MCNC: 4.7 MG/DL (ref 4.4–5.2)
CA-I BLD-MCNC: 5.3 MG/DL (ref 4.4–5.2)
CA-I BLD-MCNC: 6.8 MG/DL (ref 4.4–5.2)
CALCIUM SERPL-MCNC: 10.3 MG/DL (ref 8.8–10.2)
CALCIUM SERPL-MCNC: 9.6 MG/DL (ref 8.8–10.2)
CF REDUC 30M P MA P HEP LENFR BLD TEG: 0.7 % (ref 0–8)
CF REDUC 60M P MA P HEPASE LENFR BLD TEG: 3.9 % (ref 0–15)
CFT P HPASE BLD TEG: 1.1 MINUTE (ref 1–3)
CHLORIDE SERPL-SCNC: 93 MMOL/L (ref 98–107)
CHLORIDE SERPL-SCNC: 99 MMOL/L (ref 98–107)
CI (COAGULATION INDEX)(Z): 1.5 (ref -3–3)
CLOT ANGLE P HPASE BLD TEG: 73.4 DEGREES (ref 53–72)
CLOT INIT KAOL IND TO POST HEP NEUT TRTO: 1 {RATIO}
CLOT INIT KAOL IND TO POST HEP NEUT TRTO: 1 {RATIO}
CLOT INIT KAOL IND TO POST HEP NEUT TRTO: ABNORMAL {RATIO}
CLOT INIT KAOLIN IND BLD US: 145 SEC (ref 113–166)
CLOT INIT KAOLIN IND BLD US: 153 SEC (ref 113–166)
CLOT INIT KAOLIN IND BLD US: ABNORMAL S
CLOT INIT KAOLIN IND P HEP NEUT BLD US: 142 SEC (ref 103–153)
CLOT INIT KAOLIN IND P HEP NEUT BLD US: 146 SEC (ref 103–153)
CLOT INIT KAOLIN IND P HEP NEUT BLD US: 178 SEC (ref 103–153)
CLOT INIT P HPASE BLD TEG: 5.7 MINUTE (ref 5–10)
CLOT STIFF PLT CONT BLD CALC: 17.1 HPA (ref 11.9–29.8)
CLOT STIFF PLT CONT BLD CALC: ABNORMAL HPA
CLOT STIFF PLT CONT BLD CALC: NORMAL HPA
CLOT STIFF TF IND P HEP NEUT BLD US: 15.6 HPA (ref 13–33.2)
CLOT STIFF TF IND P HEP NEUT BLD US: 16.9 HPA (ref 13–33.2)
CLOT STIFF TF IND P HEP NEUT BLD US: 21.8 HPA (ref 13–33.2)
CLOT STIFF TF IND+IIB-IIIA INH P HEP NEU: 4.7 HPA (ref 1–3.7)
CLOT STIFF TF IND+IIB-IIIA INH P HEP NEU: ABNORMAL HPA
CLOT STIFF TF IND+IIB-IIIA INH P HEP NEU: NORMAL HPA
CLOT STRENGTH P HPASE BLD TEG: 8.9 KD/SC (ref 4.5–11)
CODING SYSTEM: NORMAL
CREAT SERPL-MCNC: 1.07 MG/DL (ref 0.51–1.17)
CREAT SERPL-MCNC: 1.13 MG/DL (ref 0.51–1.17)
CROSSMATCH: NORMAL
DEPRECATED HCO3 PLAS-SCNC: 21 MMOL/L (ref 22–29)
DEPRECATED HCO3 PLAS-SCNC: 25 MMOL/L (ref 22–29)
EOSINOPHIL # BLD AUTO: 0 10E3/UL (ref 0–0.7)
EOSINOPHIL NFR BLD AUTO: 1 %
ERYTHROCYTE [DISTWIDTH] IN BLOOD BY AUTOMATED COUNT: 13.8 % (ref 10–15)
ERYTHROCYTE [DISTWIDTH] IN BLOOD BY AUTOMATED COUNT: 13.8 % (ref 10–15)
FIBRINOGEN PPP-MCNC: 342 MG/DL (ref 170–490)
GFR SERPL CREATININE-BSD FRML MDRD: 55 ML/MIN/1.73M2
GFR SERPL CREATININE-BSD FRML MDRD: 58 ML/MIN/1.73M2
GLUCOSE BLD-MCNC: 155 MG/DL (ref 70–99)
GLUCOSE BLD-MCNC: 163 MG/DL (ref 70–99)
GLUCOSE BLD-MCNC: 185 MG/DL (ref 70–99)
GLUCOSE BLD-MCNC: 196 MG/DL (ref 70–99)
GLUCOSE BLD-MCNC: 197 MG/DL (ref 70–99)
GLUCOSE BLD-MCNC: 200 MG/DL (ref 70–99)
GLUCOSE BLD-MCNC: 204 MG/DL (ref 70–99)
GLUCOSE BLD-MCNC: 205 MG/DL (ref 70–99)
GLUCOSE BLDC GLUCOMTR-MCNC: 107 MG/DL (ref 70–99)
GLUCOSE BLDC GLUCOMTR-MCNC: 126 MG/DL (ref 70–99)
GLUCOSE BLDC GLUCOMTR-MCNC: 88 MG/DL (ref 70–99)
GLUCOSE SERPL-MCNC: 109 MG/DL (ref 70–99)
GLUCOSE SERPL-MCNC: 82 MG/DL (ref 70–99)
HCO3 BLD-SCNC: 25 MMOL/L (ref 21–28)
HCO3 BLD-SCNC: 26 MMOL/L (ref 21–28)
HCO3 BLD-SCNC: 27 MMOL/L (ref 21–28)
HCO3 BLDA-SCNC: 27 MMOL/L (ref 21–28)
HCO3 BLDA-SCNC: 27 MMOL/L (ref 21–28)
HCO3 BLDA-SCNC: 28 MMOL/L (ref 21–28)
HCO3 BLDA-SCNC: 29 MMOL/L (ref 21–28)
HCO3 BLDA-SCNC: 29 MMOL/L (ref 21–28)
HCO3 BLDV-SCNC: 30 MMOL/L (ref 21–28)
HCT VFR BLD AUTO: 35.3 % (ref 35–53)
HCT VFR BLD AUTO: 35.9 % (ref 35–53)
HGB BLD-MCNC: 11.1 G/DL (ref 11.7–17.7)
HGB BLD-MCNC: 11.8 G/DL (ref 11.7–17.7)
HGB BLD-MCNC: 12.1 G/DL (ref 11.7–17.7)
HGB BLD-MCNC: 12.2 G/DL (ref 11.7–17.7)
HGB BLD-MCNC: 12.3 G/DL (ref 11.7–17.7)
HGB BLD-MCNC: 12.4 G/DL (ref 11.7–17.7)
HGB BLD-MCNC: 9 G/DL (ref 11.7–17.7)
HGB BLD-MCNC: 9.1 G/DL (ref 11.7–17.7)
HOLD SPECIMEN: NORMAL
IMM GRANULOCYTES # BLD: 0 10E3/UL
IMM GRANULOCYTES NFR BLD: 0 %
INR PPP: 1.2 (ref 0.85–1.15)
INR PPP: 1.71 (ref 0.85–1.15)
INR PPP: 1.76 (ref 0.85–1.15)
INTERPRETATION TEGPIA: NORMAL
ISSUE DATE AND TIME: NORMAL
LACTATE BLD-SCNC: 1.4 MMOL/L
LACTATE BLD-SCNC: 1.8 MMOL/L
LACTATE BLD-SCNC: 1.9 MMOL/L
LACTATE BLD-SCNC: 2.1 MMOL/L
LACTATE BLD-SCNC: 2.7 MMOL/L
LACTATE BLD-SCNC: 3.9 MMOL/L
LACTATE SERPL-SCNC: 1.2 MMOL/L (ref 0.7–2)
LACTATE SERPL-SCNC: 3 MMOL/L (ref 0.7–2)
LYMPHOCYTES # BLD AUTO: 0.4 10E3/UL (ref 0.8–5.3)
LYMPHOCYTES NFR BLD AUTO: 7 %
MAGNESIUM SERPL-MCNC: 2.5 MG/DL (ref 1.7–2.3)
MCF P HPASE BLD TEG: 64.1 MM (ref 50–70)
MCH RBC QN AUTO: 36.2 PG (ref 26.5–33)
MCH RBC QN AUTO: 36.8 PG (ref 26.5–33)
MCHC RBC AUTO-ENTMCNC: 34.3 G/DL (ref 31.5–36.5)
MCHC RBC AUTO-ENTMCNC: 34.5 G/DL (ref 31.5–36.5)
MCV RBC AUTO: 106 FL (ref 78–100)
MCV RBC AUTO: 107 FL (ref 78–100)
MONOCYTES # BLD AUTO: 0.5 10E3/UL (ref 0–1.3)
MONOCYTES NFR BLD AUTO: 11 %
NEUTROPHILS # BLD AUTO: 4.1 10E3/UL (ref 1.6–8.3)
NEUTROPHILS NFR BLD AUTO: 80 %
NRBC # BLD AUTO: 0 10E3/UL
NRBC BLD AUTO-RTO: 0 /100
O2/TOTAL GAS SETTING VFR VENT: 100 %
O2/TOTAL GAS SETTING VFR VENT: 40 %
O2/TOTAL GAS SETTING VFR VENT: 40 %
O2/TOTAL GAS SETTING VFR VENT: 54 %
O2/TOTAL GAS SETTING VFR VENT: 54 %
O2/TOTAL GAS SETTING VFR VENT: 60 %
O2/TOTAL GAS SETTING VFR VENT: 70 %
O2/TOTAL GAS SETTING VFR VENT: 70 %
O2/TOTAL GAS SETTING VFR VENT: 80 %
OXYHGB MFR BLD: 98 % (ref 92–100)
OXYHGB MFR BLDA: 98 % (ref 92–100)
OXYHGB MFR BLDA: 99 % (ref 92–100)
OXYHGB MFR BLDV: 79 % (ref 92–100)
PA AA BLD-ACNC: 74 %
PA ADP BLD-ACNC: 41 %
PCO2 BLD: 34 MM HG (ref 35–45)
PCO2 BLD: 35 MM HG (ref 35–45)
PCO2 BLD: 35 MM HG (ref 35–45)
PCO2 BLDA: 36 MM HG (ref 35–45)
PCO2 BLDA: 38 MM HG (ref 35–45)
PCO2 BLDA: 38 MM HG (ref 35–45)
PCO2 BLDA: 41 MM HG (ref 35–45)
PCO2 BLDA: 43 MM HG (ref 35–45)
PCO2 BLDA: 49 MM HG (ref 35–45)
PCO2 BLDA: 53 MM HG (ref 35–45)
PCO2 BLDV: 46 MM HG (ref 40–50)
PH BLD: 7.46 [PH] (ref 7.35–7.45)
PH BLD: 7.47 [PH] (ref 7.35–7.45)
PH BLD: 7.51 [PH] (ref 7.35–7.45)
PH BLDA: 7.31 [PH] (ref 7.35–7.45)
PH BLDA: 7.38 [PH] (ref 7.35–7.45)
PH BLDA: 7.42 [PH] (ref 7.35–7.45)
PH BLDA: 7.45 [PH] (ref 7.35–7.45)
PH BLDA: 7.46 [PH] (ref 7.35–7.45)
PH BLDA: 7.48 [PH] (ref 7.35–7.45)
PH BLDA: 7.51 [PH] (ref 7.35–7.45)
PH BLDV: 7.42 [PH] (ref 7.32–7.43)
PHOSPHATE SERPL-MCNC: 3.1 MG/DL (ref 2.5–4.5)
PLATELET # BLD AUTO: 109 10E3/UL (ref 150–450)
PLATELET # BLD AUTO: 76 10E3/UL (ref 150–450)
PLATELET # BLD AUTO: 86 10E3/UL (ref 150–450)
PO2 BLD: 154 MM HG (ref 80–105)
PO2 BLD: 175 MM HG (ref 80–105)
PO2 BLD: 181 MM HG (ref 80–105)
PO2 BLDA: 116 MM HG (ref 80–105)
PO2 BLDA: 210 MM HG (ref 80–105)
PO2 BLDA: 234 MM HG (ref 80–105)
PO2 BLDA: 353 MM HG (ref 80–105)
PO2 BLDA: 360 MM HG (ref 80–105)
PO2 BLDA: 429 MM HG (ref 80–105)
PO2 BLDA: 445 MM HG (ref 80–105)
PO2 BLDV: 48 MM HG (ref 25–47)
POTASSIUM BLD-SCNC: 3.1 MMOL/L (ref 3.5–5)
POTASSIUM BLD-SCNC: 3.2 MMOL/L (ref 3.5–5)
POTASSIUM BLD-SCNC: 3.3 MMOL/L (ref 3.5–5)
POTASSIUM BLD-SCNC: 3.5 MMOL/L (ref 3.5–5)
POTASSIUM SERPL-SCNC: 3.4 MMOL/L (ref 3.4–5.3)
POTASSIUM SERPL-SCNC: 3.5 MMOL/L (ref 3.4–5.3)
PROT SERPL-MCNC: 5.5 G/DL (ref 6.4–8.3)
RBC # BLD AUTO: 3.34 10E6/UL (ref 3.8–5.9)
RBC # BLD AUTO: 3.37 10E6/UL (ref 3.8–5.9)
SODIUM BLD-SCNC: 130 MMOL/L (ref 133–144)
SODIUM BLD-SCNC: 132 MMOL/L (ref 133–144)
SODIUM BLD-SCNC: 133 MMOL/L (ref 133–144)
SODIUM BLD-SCNC: 133 MMOL/L (ref 133–144)
SODIUM BLD-SCNC: 134 MMOL/L (ref 133–144)
SODIUM BLD-SCNC: 135 MMOL/L (ref 133–144)
SODIUM SERPL-SCNC: 131 MMOL/L (ref 136–145)
SODIUM SERPL-SCNC: 137 MMOL/L (ref 136–145)
SPECIMEN EXPIRATION DATE: NORMAL
UNIT ABO/RH: NORMAL
UNIT NUMBER: NORMAL
UNIT STATUS: NORMAL
UNIT TYPE ISBT: 5100
UNIT TYPE ISBT: 9500
WBC # BLD AUTO: 11 10E3/UL (ref 4–11)
WBC # BLD AUTO: 5.1 10E3/UL (ref 4–11)

## 2023-04-10 PROCEDURE — 86901 BLOOD TYPING SEROLOGIC RH(D): CPT | Performed by: THORACIC SURGERY (CARDIOTHORACIC VASCULAR SURGERY)

## 2023-04-10 PROCEDURE — 82330 ASSAY OF CALCIUM: CPT

## 2023-04-10 PROCEDURE — 86850 RBC ANTIBODY SCREEN: CPT | Performed by: THORACIC SURGERY (CARDIOTHORACIC VASCULAR SURGERY)

## 2023-04-10 PROCEDURE — 82805 BLOOD GASES W/O2 SATURATION: CPT | Performed by: PHYSICIAN ASSISTANT

## 2023-04-10 PROCEDURE — 85025 COMPLETE CBC W/AUTO DIFF WBC: CPT | Performed by: THORACIC SURGERY (CARDIOTHORACIC VASCULAR SURGERY)

## 2023-04-10 PROCEDURE — 82805 BLOOD GASES W/O2 SATURATION: CPT | Performed by: STUDENT IN AN ORGANIZED HEALTH CARE EDUCATION/TRAINING PROGRAM

## 2023-04-10 PROCEDURE — 250N000011 HC RX IP 250 OP 636: Performed by: PHYSICIAN ASSISTANT

## 2023-04-10 PROCEDURE — 33465 REPLACE TRICUSPID VALVE: CPT | Performed by: THORACIC SURGERY (CARDIOTHORACIC VASCULAR SURGERY)

## 2023-04-10 PROCEDURE — 85610 PROTHROMBIN TIME: CPT | Performed by: THORACIC SURGERY (CARDIOTHORACIC VASCULAR SURGERY)

## 2023-04-10 PROCEDURE — 85396 CLOTTING ASSAY WHOLE BLOOD: CPT | Performed by: THORACIC SURGERY (CARDIOTHORACIC VASCULAR SURGERY)

## 2023-04-10 PROCEDURE — 258N000003 HC RX IP 258 OP 636: Performed by: STUDENT IN AN ORGANIZED HEALTH CARE EDUCATION/TRAINING PROGRAM

## 2023-04-10 PROCEDURE — 85730 THROMBOPLASTIN TIME PARTIAL: CPT | Performed by: THORACIC SURGERY (CARDIOTHORACIC VASCULAR SURGERY)

## 2023-04-10 PROCEDURE — 250N000011 HC RX IP 250 OP 636: Performed by: THORACIC SURGERY (CARDIOTHORACIC VASCULAR SURGERY)

## 2023-04-10 PROCEDURE — 84132 ASSAY OF SERUM POTASSIUM: CPT | Performed by: PHYSICIAN ASSISTANT

## 2023-04-10 PROCEDURE — 999N000141 HC STATISTIC PRE-PROCEDURE NURSING ASSESSMENT: Performed by: THORACIC SURGERY (CARDIOTHORACIC VASCULAR SURGERY)

## 2023-04-10 PROCEDURE — 94799 UNLISTED PULMONARY SVC/PX: CPT

## 2023-04-10 PROCEDURE — 278N000051 HC OR IMPLANT GENERAL: Performed by: THORACIC SURGERY (CARDIOTHORACIC VASCULAR SURGERY)

## 2023-04-10 PROCEDURE — 71045 X-RAY EXAM CHEST 1 VIEW: CPT | Mod: 26 | Performed by: RADIOLOGY

## 2023-04-10 PROCEDURE — 250N000011 HC RX IP 250 OP 636: Performed by: STUDENT IN AN ORGANIZED HEALTH CARE EDUCATION/TRAINING PROGRAM

## 2023-04-10 PROCEDURE — 250N000013 HC RX MED GY IP 250 OP 250 PS 637: Performed by: PHYSICIAN ASSISTANT

## 2023-04-10 PROCEDURE — 250N000009 HC RX 250: Performed by: STUDENT IN AN ORGANIZED HEALTH CARE EDUCATION/TRAINING PROGRAM

## 2023-04-10 PROCEDURE — 258N000003 HC RX IP 258 OP 636

## 2023-04-10 PROCEDURE — 271N000002 HC RX 271

## 2023-04-10 PROCEDURE — 3E043XZ INTRODUCTION OF VASOPRESSOR INTO CENTRAL VEIN, PERCUTANEOUS APPROACH: ICD-10-PCS | Performed by: THORACIC SURGERY (CARDIOTHORACIC VASCULAR SURGERY)

## 2023-04-10 PROCEDURE — 85014 HEMATOCRIT: CPT | Performed by: PHYSICIAN ASSISTANT

## 2023-04-10 PROCEDURE — 36415 COLL VENOUS BLD VENIPUNCTURE: CPT | Performed by: THORACIC SURGERY (CARDIOTHORACIC VASCULAR SURGERY)

## 2023-04-10 PROCEDURE — P9016 RBC LEUKOCYTES REDUCED: HCPCS

## 2023-04-10 PROCEDURE — 272N000002 HC OR SUPPLY OTHER OPNP: Performed by: THORACIC SURGERY (CARDIOTHORACIC VASCULAR SURGERY)

## 2023-04-10 PROCEDURE — 84100 ASSAY OF PHOSPHORUS: CPT | Performed by: PHYSICIAN ASSISTANT

## 2023-04-10 PROCEDURE — 272N000088 HC PUMP APP ADULT PERFUSION: Performed by: THORACIC SURGERY (CARDIOTHORACIC VASCULAR SURGERY)

## 2023-04-10 PROCEDURE — 02RJ08Z REPLACEMENT OF TRICUSPID VALVE WITH ZOOPLASTIC TISSUE, OPEN APPROACH: ICD-10-PCS | Performed by: THORACIC SURGERY (CARDIOTHORACIC VASCULAR SURGERY)

## 2023-04-10 PROCEDURE — P9037 PLATE PHERES LEUKOREDU IRRAD: HCPCS | Performed by: THORACIC SURGERY (CARDIOTHORACIC VASCULAR SURGERY)

## 2023-04-10 PROCEDURE — 999N000065 XR CHEST PORT 1 VIEW

## 2023-04-10 PROCEDURE — 250N000011 HC RX IP 250 OP 636

## 2023-04-10 PROCEDURE — 250N000009 HC RX 250: Performed by: THORACIC SURGERY (CARDIOTHORACIC VASCULAR SURGERY)

## 2023-04-10 PROCEDURE — 85730 THROMBOPLASTIN TIME PARTIAL: CPT | Performed by: PHYSICIAN ASSISTANT

## 2023-04-10 PROCEDURE — 410N000003 HC PER-PERFUSION 1ST 30 MIN: Performed by: THORACIC SURGERY (CARDIOTHORACIC VASCULAR SURGERY)

## 2023-04-10 PROCEDURE — 83605 ASSAY OF LACTIC ACID: CPT | Performed by: STUDENT IN AN ORGANIZED HEALTH CARE EDUCATION/TRAINING PROGRAM

## 2023-04-10 PROCEDURE — 82330 ASSAY OF CALCIUM: CPT | Performed by: PHYSICIAN ASSISTANT

## 2023-04-10 PROCEDURE — 83735 ASSAY OF MAGNESIUM: CPT | Performed by: PHYSICIAN ASSISTANT

## 2023-04-10 PROCEDURE — 410N000004: Performed by: THORACIC SURGERY (CARDIOTHORACIC VASCULAR SURGERY)

## 2023-04-10 PROCEDURE — 99291 CRITICAL CARE FIRST HOUR: CPT | Mod: GC | Performed by: STUDENT IN AN ORGANIZED HEALTH CARE EDUCATION/TRAINING PROGRAM

## 2023-04-10 PROCEDURE — 272N000085 HC PACK CELL SAVER CSP: Performed by: THORACIC SURGERY (CARDIOTHORACIC VASCULAR SURGERY)

## 2023-04-10 PROCEDURE — 82805 BLOOD GASES W/O2 SATURATION: CPT

## 2023-04-10 PROCEDURE — 250N000013 HC RX MED GY IP 250 OP 250 PS 637: Performed by: THORACIC SURGERY (CARDIOTHORACIC VASCULAR SURGERY)

## 2023-04-10 PROCEDURE — 80053 COMPREHEN METABOLIC PANEL: CPT | Performed by: THORACIC SURGERY (CARDIOTHORACIC VASCULAR SURGERY)

## 2023-04-10 PROCEDURE — 258N000003 HC RX IP 258 OP 636: Performed by: SURGERY

## 2023-04-10 PROCEDURE — 258N000003 HC RX IP 258 OP 636: Performed by: THORACIC SURGERY (CARDIOTHORACIC VASCULAR SURGERY)

## 2023-04-10 PROCEDURE — 370N000017 HC ANESTHESIA TECHNICAL FEE, PER MIN: Performed by: THORACIC SURGERY (CARDIOTHORACIC VASCULAR SURGERY)

## 2023-04-10 PROCEDURE — 84295 ASSAY OF SERUM SODIUM: CPT

## 2023-04-10 PROCEDURE — 200N000002 HC R&B ICU UMMC

## 2023-04-10 PROCEDURE — 5A1221Z PERFORMANCE OF CARDIAC OUTPUT, CONTINUOUS: ICD-10-PCS | Performed by: THORACIC SURGERY (CARDIOTHORACIC VASCULAR SURGERY)

## 2023-04-10 PROCEDURE — 5A1223Z PERFORMANCE OF CARDIAC PACING, CONTINUOUS: ICD-10-PCS | Performed by: THORACIC SURGERY (CARDIOTHORACIC VASCULAR SURGERY)

## 2023-04-10 PROCEDURE — 85610 PROTHROMBIN TIME: CPT | Performed by: PHYSICIAN ASSISTANT

## 2023-04-10 PROCEDURE — 83605 ASSAY OF LACTIC ACID: CPT

## 2023-04-10 PROCEDURE — 85027 COMPLETE CBC AUTOMATED: CPT | Performed by: THORACIC SURGERY (CARDIOTHORACIC VASCULAR SURGERY)

## 2023-04-10 PROCEDURE — 250N000009 HC RX 250: Performed by: PHYSICIAN ASSISTANT

## 2023-04-10 PROCEDURE — 84132 ASSAY OF SERUM POTASSIUM: CPT

## 2023-04-10 PROCEDURE — 85384 FIBRINOGEN ACTIVITY: CPT | Performed by: THORACIC SURGERY (CARDIOTHORACIC VASCULAR SURGERY)

## 2023-04-10 PROCEDURE — 82810 BLOOD GASES O2 SAT ONLY: CPT

## 2023-04-10 PROCEDURE — 84132 ASSAY OF SERUM POTASSIUM: CPT | Performed by: THORACIC SURGERY (CARDIOTHORACIC VASCULAR SURGERY)

## 2023-04-10 PROCEDURE — 93005 ELECTROCARDIOGRAM TRACING: CPT

## 2023-04-10 PROCEDURE — 85018 HEMOGLOBIN: CPT

## 2023-04-10 PROCEDURE — 250N000009 HC RX 250

## 2023-04-10 PROCEDURE — C1763 CONN TISS, NON-HUMAN: HCPCS | Performed by: THORACIC SURGERY (CARDIOTHORACIC VASCULAR SURGERY)

## 2023-04-10 PROCEDURE — 360N000079 HC SURGERY LEVEL 6, PER MIN: Performed by: THORACIC SURGERY (CARDIOTHORACIC VASCULAR SURGERY)

## 2023-04-10 PROCEDURE — 250N000024 HC ISOFLURANE, PER MIN: Performed by: THORACIC SURGERY (CARDIOTHORACIC VASCULAR SURGERY)

## 2023-04-10 PROCEDURE — 85396 CLOTTING ASSAY WHOLE BLOOD: CPT

## 2023-04-10 PROCEDURE — 93010 ELECTROCARDIOGRAM REPORT: CPT | Mod: 59 | Performed by: INTERNAL MEDICINE

## 2023-04-10 PROCEDURE — 94002 VENT MGMT INPAT INIT DAY: CPT

## 2023-04-10 PROCEDURE — 999N000157 HC STATISTIC RCP TIME EA 10 MIN

## 2023-04-10 PROCEDURE — 272N000001 HC OR GENERAL SUPPLY STERILE: Performed by: THORACIC SURGERY (CARDIOTHORACIC VASCULAR SURGERY)

## 2023-04-10 PROCEDURE — C1713 ANCHOR/SCREW BN/BN,TIS/BN: HCPCS | Performed by: THORACIC SURGERY (CARDIOTHORACIC VASCULAR SURGERY)

## 2023-04-10 PROCEDURE — C1898 LEAD, PMKR, OTHER THAN TRANS: HCPCS | Performed by: THORACIC SURGERY (CARDIOTHORACIC VASCULAR SURGERY)

## 2023-04-10 PROCEDURE — 86923 COMPATIBILITY TEST ELECTRIC: CPT

## 2023-04-10 PROCEDURE — 83605 ASSAY OF LACTIC ACID: CPT | Performed by: PHYSICIAN ASSISTANT

## 2023-04-10 DEVICE — VALVE MITRAL EPIC STENTED PORCINE 29MM E100-29M-00: Type: IMPLANTABLE DEVICE | Site: HEART | Status: FUNCTIONAL

## 2023-04-10 DEVICE — COR-KNOT MINI® COMBO KIT
Type: IMPLANTABLE DEVICE | Site: HEART | Status: FUNCTIONAL
Brand: COR-KNOT MINI®

## 2023-04-10 DEVICE — IMP PATCH PERICARDIUM PHOTOFIX BOVINE 8X14CM PFP8X14: Type: IMPLANTABLE DEVICE | Site: HEART | Status: FUNCTIONAL

## 2023-04-10 RX ORDER — SODIUM CHLORIDE, SODIUM LACTATE, POTASSIUM CHLORIDE, CALCIUM CHLORIDE 600; 310; 30; 20 MG/100ML; MG/100ML; MG/100ML; MG/100ML
INJECTION, SOLUTION INTRAVENOUS CONTINUOUS PRN
Status: DISCONTINUED | OUTPATIENT
Start: 2023-04-10 | End: 2023-04-10

## 2023-04-10 RX ORDER — AMOXICILLIN 250 MG
1 CAPSULE ORAL 2 TIMES DAILY
Status: DISCONTINUED | OUTPATIENT
Start: 2023-04-10 | End: 2023-04-13

## 2023-04-10 RX ORDER — NALOXONE HYDROCHLORIDE 0.4 MG/ML
0.2 INJECTION, SOLUTION INTRAMUSCULAR; INTRAVENOUS; SUBCUTANEOUS
Status: DISCONTINUED | OUTPATIENT
Start: 2023-04-10 | End: 2023-04-17 | Stop reason: HOSPADM

## 2023-04-10 RX ORDER — LIDOCAINE 40 MG/G
CREAM TOPICAL
Status: DISCONTINUED | OUTPATIENT
Start: 2023-04-10 | End: 2023-04-10 | Stop reason: HOSPADM

## 2023-04-10 RX ORDER — LIDOCAINE HYDROCHLORIDE 10 MG/ML
INJECTION, SOLUTION INFILTRATION; PERINEURAL
Status: COMPLETED | OUTPATIENT
Start: 2023-04-10 | End: 2023-04-10

## 2023-04-10 RX ORDER — NOREPINEPHRINE BITARTRATE 0.02 MG/ML
INJECTION, SOLUTION INTRAVENOUS CONTINUOUS PRN
Status: DISCONTINUED | OUTPATIENT
Start: 2023-04-10 | End: 2023-04-10

## 2023-04-10 RX ORDER — CLINDAMYCIN PHOSPHATE 900 MG/50ML
900 INJECTION, SOLUTION INTRAVENOUS EVERY 8 HOURS
Status: COMPLETED | OUTPATIENT
Start: 2023-04-10 | End: 2023-04-11

## 2023-04-10 RX ORDER — NICOTINE POLACRILEX 4 MG
15-30 LOZENGE BUCCAL
Status: DISCONTINUED | OUTPATIENT
Start: 2023-04-10 | End: 2023-04-10

## 2023-04-10 RX ORDER — SODIUM CHLORIDE, SODIUM LACTATE, POTASSIUM CHLORIDE, CALCIUM CHLORIDE 600; 310; 30; 20 MG/100ML; MG/100ML; MG/100ML; MG/100ML
INJECTION, SOLUTION INTRAVENOUS CONTINUOUS
Status: DISCONTINUED | OUTPATIENT
Start: 2023-04-10 | End: 2023-04-10 | Stop reason: HOSPADM

## 2023-04-10 RX ORDER — CALCIUM GLUCONATE 20 MG/ML
1 INJECTION, SOLUTION INTRAVENOUS
Status: ACTIVE | OUTPATIENT
Start: 2023-04-10 | End: 2023-04-16

## 2023-04-10 RX ORDER — ONDANSETRON 4 MG/1
4 TABLET, ORALLY DISINTEGRATING ORAL EVERY 6 HOURS PRN
Status: DISCONTINUED | OUTPATIENT
Start: 2023-04-10 | End: 2023-04-17 | Stop reason: HOSPADM

## 2023-04-10 RX ORDER — HEPARIN SODIUM 5000 [USP'U]/.5ML
5000 INJECTION, SOLUTION INTRAVENOUS; SUBCUTANEOUS EVERY 8 HOURS
Status: DISCONTINUED | OUTPATIENT
Start: 2023-04-11 | End: 2023-04-17 | Stop reason: HOSPADM

## 2023-04-10 RX ORDER — NALOXONE HYDROCHLORIDE 0.4 MG/ML
0.4 INJECTION, SOLUTION INTRAMUSCULAR; INTRAVENOUS; SUBCUTANEOUS
Status: DISCONTINUED | OUTPATIENT
Start: 2023-04-10 | End: 2023-04-17 | Stop reason: HOSPADM

## 2023-04-10 RX ORDER — OXYCODONE HYDROCHLORIDE 5 MG/1
5 TABLET ORAL EVERY 4 HOURS PRN
Status: DISCONTINUED | OUTPATIENT
Start: 2023-04-10 | End: 2023-04-17 | Stop reason: HOSPADM

## 2023-04-10 RX ORDER — HYDROMORPHONE HCL IN WATER/PF 6 MG/30 ML
0.4 PATIENT CONTROLLED ANALGESIA SYRINGE INTRAVENOUS
Status: DISCONTINUED | OUTPATIENT
Start: 2023-04-10 | End: 2023-04-17 | Stop reason: HOSPADM

## 2023-04-10 RX ORDER — NALOXONE HYDROCHLORIDE 0.4 MG/ML
0.4 INJECTION, SOLUTION INTRAMUSCULAR; INTRAVENOUS; SUBCUTANEOUS
Status: DISCONTINUED | OUTPATIENT
Start: 2023-04-10 | End: 2023-04-10 | Stop reason: HOSPADM

## 2023-04-10 RX ORDER — DEXTROSE MONOHYDRATE 25 G/50ML
25-50 INJECTION, SOLUTION INTRAVENOUS
Status: DISCONTINUED | OUTPATIENT
Start: 2023-04-10 | End: 2023-04-10

## 2023-04-10 RX ORDER — DEXTROSE MONOHYDRATE 25 G/50ML
25-50 INJECTION, SOLUTION INTRAVENOUS
Status: DISCONTINUED | OUTPATIENT
Start: 2023-04-10 | End: 2023-04-17 | Stop reason: HOSPADM

## 2023-04-10 RX ORDER — CLINDAMYCIN PHOSPHATE 900 MG/50ML
900 INJECTION, SOLUTION INTRAVENOUS
Status: COMPLETED | OUTPATIENT
Start: 2023-04-10 | End: 2023-04-10

## 2023-04-10 RX ORDER — ACETAMINOPHEN 325 MG/1
975 TABLET ORAL ONCE
Status: COMPLETED | OUTPATIENT
Start: 2023-04-10 | End: 2023-04-10

## 2023-04-10 RX ORDER — PHENYLEPHRINE HCL IN 0.9% NACL 50MG/250ML
.1-6 PLASTIC BAG, INJECTION (ML) INTRAVENOUS CONTINUOUS
Status: DISCONTINUED | OUTPATIENT
Start: 2023-04-10 | End: 2023-04-10 | Stop reason: HOSPADM

## 2023-04-10 RX ORDER — NALOXONE HYDROCHLORIDE 0.4 MG/ML
0.2 INJECTION, SOLUTION INTRAMUSCULAR; INTRAVENOUS; SUBCUTANEOUS
Status: DISCONTINUED | OUTPATIENT
Start: 2023-04-10 | End: 2023-04-10 | Stop reason: HOSPADM

## 2023-04-10 RX ORDER — LIDOCAINE 40 MG/G
CREAM TOPICAL
Status: DISCONTINUED | OUTPATIENT
Start: 2023-04-10 | End: 2023-04-17 | Stop reason: HOSPADM

## 2023-04-10 RX ORDER — ONDANSETRON 2 MG/ML
4 INJECTION INTRAMUSCULAR; INTRAVENOUS EVERY 6 HOURS PRN
Status: DISCONTINUED | OUTPATIENT
Start: 2023-04-10 | End: 2023-04-17 | Stop reason: HOSPADM

## 2023-04-10 RX ORDER — NICOTINE POLACRILEX 4 MG
15-30 LOZENGE BUCCAL
Status: DISCONTINUED | OUTPATIENT
Start: 2023-04-10 | End: 2023-04-17 | Stop reason: HOSPADM

## 2023-04-10 RX ORDER — CALCIUM CHLORIDE 100 MG/ML
INJECTION INTRAVENOUS; INTRAVENTRICULAR PRN
Status: DISCONTINUED | OUTPATIENT
Start: 2023-04-10 | End: 2023-04-10

## 2023-04-10 RX ORDER — OXYCODONE HYDROCHLORIDE 10 MG/1
10 TABLET ORAL EVERY 4 HOURS PRN
Status: DISCONTINUED | OUTPATIENT
Start: 2023-04-10 | End: 2023-04-17 | Stop reason: HOSPADM

## 2023-04-10 RX ORDER — DEXMEDETOMIDINE HYDROCHLORIDE 4 UG/ML
.1-1.2 INJECTION, SOLUTION INTRAVENOUS CONTINUOUS
Status: DISCONTINUED | OUTPATIENT
Start: 2023-04-10 | End: 2023-04-10 | Stop reason: HOSPADM

## 2023-04-10 RX ORDER — NOREPINEPHRINE BITARTRATE 0.06 MG/ML
.01-.6 INJECTION, SOLUTION INTRAVENOUS CONTINUOUS
Status: DISCONTINUED | OUTPATIENT
Start: 2023-04-10 | End: 2023-04-11

## 2023-04-10 RX ORDER — PROPOFOL 10 MG/ML
INJECTION, EMULSION INTRAVENOUS CONTINUOUS PRN
Status: DISCONTINUED | OUTPATIENT
Start: 2023-04-10 | End: 2023-04-10

## 2023-04-10 RX ORDER — FLUMAZENIL 0.1 MG/ML
0.2 INJECTION, SOLUTION INTRAVENOUS
Status: DISCONTINUED | OUTPATIENT
Start: 2023-04-10 | End: 2023-04-10 | Stop reason: HOSPADM

## 2023-04-10 RX ORDER — CHLORHEXIDINE GLUCONATE ORAL RINSE 1.2 MG/ML
10 SOLUTION DENTAL ONCE
Status: COMPLETED | OUTPATIENT
Start: 2023-04-10 | End: 2023-04-10

## 2023-04-10 RX ORDER — HEPARIN SODIUM 1000 [USP'U]/ML
INJECTION, SOLUTION INTRAVENOUS; SUBCUTANEOUS PRN
Status: DISCONTINUED | OUTPATIENT
Start: 2023-04-10 | End: 2023-04-10

## 2023-04-10 RX ORDER — SODIUM CHLORIDE, SODIUM GLUCONATE, SODIUM ACETATE, POTASSIUM CHLORIDE AND MAGNESIUM CHLORIDE 526; 502; 368; 37; 30 MG/100ML; MG/100ML; MG/100ML; MG/100ML; MG/100ML
INJECTION, SOLUTION INTRAVENOUS CONTINUOUS PRN
Status: DISCONTINUED | OUTPATIENT
Start: 2023-04-10 | End: 2023-04-10

## 2023-04-10 RX ORDER — GABAPENTIN 100 MG/1
100 CAPSULE ORAL
Status: COMPLETED | OUTPATIENT
Start: 2023-04-10 | End: 2023-04-10

## 2023-04-10 RX ORDER — POTASSIUM CHLORIDE 7.45 MG/ML
10 INJECTION INTRAVENOUS
Status: DISCONTINUED | OUTPATIENT
Start: 2023-04-10 | End: 2023-04-10

## 2023-04-10 RX ORDER — PANTOPRAZOLE SODIUM 40 MG/1
40 TABLET, DELAYED RELEASE ORAL DAILY
Status: DISCONTINUED | OUTPATIENT
Start: 2023-04-10 | End: 2023-04-17 | Stop reason: HOSPADM

## 2023-04-10 RX ORDER — FENTANYL CITRATE 50 UG/ML
25-50 INJECTION, SOLUTION INTRAMUSCULAR; INTRAVENOUS
Status: DISCONTINUED | OUTPATIENT
Start: 2023-04-10 | End: 2023-04-10 | Stop reason: HOSPADM

## 2023-04-10 RX ORDER — HYDROXYCHLOROQUINE SULFATE 200 MG/1
TABLET, FILM COATED ORAL
Qty: 90 TABLET | Refills: 0 | OUTPATIENT
Start: 2023-04-10

## 2023-04-10 RX ORDER — PROCHLORPERAZINE MALEATE 5 MG
10 TABLET ORAL EVERY 6 HOURS PRN
Status: DISCONTINUED | OUTPATIENT
Start: 2023-04-10 | End: 2023-04-17 | Stop reason: HOSPADM

## 2023-04-10 RX ORDER — FENTANYL CITRATE 50 UG/ML
INJECTION, SOLUTION INTRAMUSCULAR; INTRAVENOUS PRN
Status: DISCONTINUED | OUTPATIENT
Start: 2023-04-10 | End: 2023-04-10

## 2023-04-10 RX ORDER — MUPIROCIN 20 MG/G
0.5 OINTMENT TOPICAL 2 TIMES DAILY
Status: COMPLETED | OUTPATIENT
Start: 2023-04-10 | End: 2023-04-15

## 2023-04-10 RX ORDER — ACETAMINOPHEN 325 MG/1
975 TABLET ORAL EVERY 8 HOURS
Status: COMPLETED | OUTPATIENT
Start: 2023-04-10 | End: 2023-04-13

## 2023-04-10 RX ORDER — HYDROMORPHONE HCL IN WATER/PF 6 MG/30 ML
0.2 PATIENT CONTROLLED ANALGESIA SYRINGE INTRAVENOUS
Status: DISCONTINUED | OUTPATIENT
Start: 2023-04-10 | End: 2023-04-17 | Stop reason: HOSPADM

## 2023-04-10 RX ORDER — HYDRALAZINE HYDROCHLORIDE 20 MG/ML
10 INJECTION INTRAMUSCULAR; INTRAVENOUS EVERY 30 MIN PRN
Status: DISCONTINUED | OUTPATIENT
Start: 2023-04-10 | End: 2023-04-17 | Stop reason: HOSPADM

## 2023-04-10 RX ORDER — CALCIUM GLUCONATE 20 MG/ML
2 INJECTION, SOLUTION INTRAVENOUS
Status: ACTIVE | OUTPATIENT
Start: 2023-04-10 | End: 2023-04-16

## 2023-04-10 RX ORDER — ASPIRIN 81 MG/1
81 TABLET, CHEWABLE ORAL DAILY
Status: DISCONTINUED | OUTPATIENT
Start: 2023-04-11 | End: 2023-04-17 | Stop reason: HOSPADM

## 2023-04-10 RX ORDER — PROTAMINE SULFATE 10 MG/ML
INJECTION, SOLUTION INTRAVENOUS PRN
Status: DISCONTINUED | OUTPATIENT
Start: 2023-04-10 | End: 2023-04-10

## 2023-04-10 RX ORDER — POLYETHYLENE GLYCOL 3350 17 G/17G
17 POWDER, FOR SOLUTION ORAL DAILY
Status: DISCONTINUED | OUTPATIENT
Start: 2023-04-11 | End: 2023-04-13

## 2023-04-10 RX ORDER — PROPOFOL 10 MG/ML
INJECTION, EMULSION INTRAVENOUS PRN
Status: DISCONTINUED | OUTPATIENT
Start: 2023-04-10 | End: 2023-04-10

## 2023-04-10 RX ORDER — FAMOTIDINE 20 MG/1
20 TABLET, FILM COATED ORAL
Status: COMPLETED | OUTPATIENT
Start: 2023-04-10 | End: 2023-04-10

## 2023-04-10 RX ORDER — CLINDAMYCIN PHOSPHATE 900 MG/50ML
900 INJECTION, SOLUTION INTRAVENOUS SEE ADMIN INSTRUCTIONS
Status: DISCONTINUED | OUTPATIENT
Start: 2023-04-10 | End: 2023-04-10 | Stop reason: HOSPADM

## 2023-04-10 RX ORDER — EPINEPHRINE 0.1 MG/ML
INJECTION INTRAVENOUS PRN
Status: DISCONTINUED | OUTPATIENT
Start: 2023-04-10 | End: 2023-04-10

## 2023-04-10 RX ORDER — PROPOFOL 10 MG/ML
5-75 INJECTION, EMULSION INTRAVENOUS CONTINUOUS
Status: DISCONTINUED | OUTPATIENT
Start: 2023-04-10 | End: 2023-04-11

## 2023-04-10 RX ORDER — ACETAMINOPHEN 325 MG/1
650 TABLET ORAL EVERY 4 HOURS PRN
Status: DISCONTINUED | OUTPATIENT
Start: 2023-04-13 | End: 2023-04-17 | Stop reason: HOSPADM

## 2023-04-10 RX ORDER — VANCOMYCIN HYDROCHLORIDE 1 G/200ML
1000 INJECTION, SOLUTION INTRAVENOUS
Status: COMPLETED | OUTPATIENT
Start: 2023-04-10 | End: 2023-04-10

## 2023-04-10 RX ORDER — LIDOCAINE HYDROCHLORIDE 20 MG/ML
INJECTION, SOLUTION INFILTRATION; PERINEURAL PRN
Status: DISCONTINUED | OUTPATIENT
Start: 2023-04-10 | End: 2023-04-10

## 2023-04-10 RX ORDER — DEXMEDETOMIDINE HYDROCHLORIDE 4 UG/ML
.2-.7 INJECTION, SOLUTION INTRAVENOUS CONTINUOUS
Status: DISCONTINUED | OUTPATIENT
Start: 2023-04-10 | End: 2023-04-11

## 2023-04-10 RX ORDER — POTASSIUM CHLORIDE 29.8 MG/ML
20 INJECTION INTRAVENOUS ONCE
Status: COMPLETED | OUTPATIENT
Start: 2023-04-10 | End: 2023-04-10

## 2023-04-10 RX ORDER — BISACODYL 10 MG
10 SUPPOSITORY, RECTAL RECTAL DAILY PRN
Status: DISCONTINUED | OUTPATIENT
Start: 2023-04-10 | End: 2023-04-17 | Stop reason: HOSPADM

## 2023-04-10 RX ADMIN — LIDOCAINE HYDROCHLORIDE 60 MG: 20 INJECTION, SOLUTION INFILTRATION; PERINEURAL at 08:03

## 2023-04-10 RX ADMIN — CLINDAMYCIN IN 5 PERCENT DEXTROSE 900 MG: 18 INJECTION, SOLUTION INTRAVENOUS at 15:39

## 2023-04-10 RX ADMIN — SUGAMMADEX 200 MG: 100 INJECTION, SOLUTION INTRAVENOUS at 13:35

## 2023-04-10 RX ADMIN — EPINEPHRINE 0.03 MCG/KG/MIN: 1 INJECTION INTRAMUSCULAR; INTRAVENOUS; SUBCUTANEOUS at 14:05

## 2023-04-10 RX ADMIN — HEPARIN SODIUM 20000 UNITS: 1000 INJECTION INTRAVENOUS; SUBCUTANEOUS at 09:15

## 2023-04-10 RX ADMIN — Medication 50 MG: at 08:50

## 2023-04-10 RX ADMIN — ACETAMINOPHEN 975 MG: 325 TABLET ORAL at 06:10

## 2023-04-10 RX ADMIN — EPINEPHRINE 10 MCG: 0.1 INJECTION INTRACARDIAC; INTRAVENOUS at 08:14

## 2023-04-10 RX ADMIN — PROPOFOL 25 MCG/KG/MIN: 10 INJECTION, EMULSION INTRAVENOUS at 22:28

## 2023-04-10 RX ADMIN — CLINDAMYCIN IN 5 PERCENT DEXTROSE 900 MG: 18 INJECTION, SOLUTION INTRAVENOUS at 23:45

## 2023-04-10 RX ADMIN — Medication 7 ML/HR: at 10:24

## 2023-04-10 RX ADMIN — Medication 50 MG: at 08:53

## 2023-04-10 RX ADMIN — Medication 5 G: at 08:32

## 2023-04-10 RX ADMIN — PHENYLEPHRINE HYDROCHLORIDE 100 MCG: 10 INJECTION INTRAVENOUS at 08:26

## 2023-04-10 RX ADMIN — PHENYLEPHRINE HYDROCHLORIDE 100 MCG: 10 INJECTION INTRAVENOUS at 08:10

## 2023-04-10 RX ADMIN — SODIUM CHLORIDE, POTASSIUM CHLORIDE, SODIUM LACTATE AND CALCIUM CHLORIDE 500 ML: 600; 310; 30; 20 INJECTION, SOLUTION INTRAVENOUS at 22:22

## 2023-04-10 RX ADMIN — PHENYLEPHRINE HYDROCHLORIDE 100 MCG: 10 INJECTION INTRAVENOUS at 08:17

## 2023-04-10 RX ADMIN — PHENYLEPHRINE HYDROCHLORIDE 100 MCG: 10 INJECTION INTRAVENOUS at 08:21

## 2023-04-10 RX ADMIN — SENNOSIDES AND DOCUSATE SODIUM 1 TABLET: 50; 8.6 TABLET ORAL at 19:58

## 2023-04-10 RX ADMIN — PHENYLEPHRINE HYDROCHLORIDE 100 MCG: 10 INJECTION INTRAVENOUS at 08:03

## 2023-04-10 RX ADMIN — FENTANYL CITRATE 200 MCG: 50 INJECTION, SOLUTION INTRAMUSCULAR; INTRAVENOUS at 08:03

## 2023-04-10 RX ADMIN — VANCOMYCIN HYDROCHLORIDE 750 MG: 1 INJECTION, POWDER, LYOPHILIZED, FOR SOLUTION INTRAVENOUS at 19:58

## 2023-04-10 RX ADMIN — SODIUM CHLORIDE, POTASSIUM CHLORIDE, SODIUM LACTATE AND CALCIUM CHLORIDE 500 ML: 600; 310; 30; 20 INJECTION, SOLUTION INTRAVENOUS at 19:50

## 2023-04-10 RX ADMIN — PROPOFOL 50 MCG/KG/MIN: 10 INJECTION, EMULSION INTRAVENOUS at 12:37

## 2023-04-10 RX ADMIN — SODIUM CHLORIDE, POTASSIUM CHLORIDE, SODIUM LACTATE AND CALCIUM CHLORIDE: 600; 310; 30; 20 INJECTION, SOLUTION INTRAVENOUS at 07:36

## 2023-04-10 RX ADMIN — Medication 30 MG: at 10:20

## 2023-04-10 RX ADMIN — VANCOMYCIN HYDROCHLORIDE 1000 MG: 1 INJECTION, SOLUTION INTRAVENOUS at 08:32

## 2023-04-10 RX ADMIN — CHLORHEXIDINE GLUCONATE 0.12% ORAL RINSE 10 ML: 1.2 LIQUID ORAL at 07:24

## 2023-04-10 RX ADMIN — Medication 50 MCG/HR: at 14:06

## 2023-04-10 RX ADMIN — POTASSIUM CHLORIDE 20 MEQ: 29.8 INJECTION, SOLUTION INTRAVENOUS at 21:10

## 2023-04-10 RX ADMIN — CLINDAMYCIN PHOSPHATE 900 MG: 900 INJECTION, SOLUTION INTRAVENOUS at 08:10

## 2023-04-10 RX ADMIN — Medication 100 MG: at 08:04

## 2023-04-10 RX ADMIN — HUMAN INSULIN 2 UNITS/HR: 100 INJECTION, SOLUTION SUBCUTANEOUS at 09:24

## 2023-04-10 RX ADMIN — PHENYLEPHRINE HYDROCHLORIDE 100 MCG: 10 INJECTION INTRAVENOUS at 08:08

## 2023-04-10 RX ADMIN — EPINEPHRINE 0.03 MCG/KG/MIN: 1 INJECTION INTRAMUSCULAR; INTRAVENOUS; SUBCUTANEOUS at 08:32

## 2023-04-10 RX ADMIN — ACETAMINOPHEN 975 MG: 325 TABLET ORAL at 14:24

## 2023-04-10 RX ADMIN — EPINEPHRINE 10 MCG: 0.1 INJECTION INTRACARDIAC; INTRAVENOUS at 08:22

## 2023-04-10 RX ADMIN — FENTANYL CITRATE 100 MCG: 50 INJECTION, SOLUTION INTRAMUSCULAR; INTRAVENOUS at 08:58

## 2023-04-10 RX ADMIN — PROTAMINE SULFATE 75 MG: 10 INJECTION, SOLUTION INTRAVENOUS at 12:00

## 2023-04-10 RX ADMIN — LIDOCAINE HYDROCHLORIDE 5 ML: 10 INJECTION, SOLUTION INFILTRATION; PERINEURAL at 07:50

## 2023-04-10 RX ADMIN — FAMOTIDINE 20 MG: 20 TABLET ORAL at 06:10

## 2023-04-10 RX ADMIN — MIDAZOLAM 2 MG: 1 INJECTION INTRAMUSCULAR; INTRAVENOUS at 06:41

## 2023-04-10 RX ADMIN — SODIUM CHLORIDE, SODIUM GLUCONATE, SODIUM ACETATE, POTASSIUM CHLORIDE AND MAGNESIUM CHLORIDE: 526; 502; 368; 37; 30 INJECTION, SOLUTION INTRAVENOUS at 07:36

## 2023-04-10 RX ADMIN — ACETAMINOPHEN 975 MG: 325 TABLET ORAL at 21:11

## 2023-04-10 RX ADMIN — Medication 0.03 MCG/KG/MIN: at 14:04

## 2023-04-10 RX ADMIN — EPINEPHRINE 10 MCG: 0.1 INJECTION INTRACARDIAC; INTRAVENOUS at 08:06

## 2023-04-10 RX ADMIN — Medication 500 MG: at 14:13

## 2023-04-10 RX ADMIN — FENTANYL CITRATE 200 MCG: 50 INJECTION, SOLUTION INTRAMUSCULAR; INTRAVENOUS at 11:37

## 2023-04-10 RX ADMIN — PROPOFOL 60 MG: 10 INJECTION, EMULSION INTRAVENOUS at 08:03

## 2023-04-10 RX ADMIN — SODIUM CHLORIDE, POTASSIUM CHLORIDE, SODIUM LACTATE AND CALCIUM CHLORIDE 500 ML: 600; 310; 30; 20 INJECTION, SOLUTION INTRAVENOUS at 17:14

## 2023-04-10 RX ADMIN — AMINOCAPROIC ACID 1 G/HR: 250 INJECTION, SOLUTION INTRAVENOUS at 09:28

## 2023-04-10 RX ADMIN — MUPIROCIN 0.5 G: 20 OINTMENT TOPICAL at 20:46

## 2023-04-10 RX ADMIN — PROPOFOL 30 MCG/KG/MIN: 10 INJECTION, EMULSION INTRAVENOUS at 14:05

## 2023-04-10 RX ADMIN — CALCIUM CHLORIDE 1 G: 100 INJECTION INTRAVENOUS; INTRAVENTRICULAR at 11:58

## 2023-04-10 RX ADMIN — Medication 0.03 MCG/KG/MIN: at 08:50

## 2023-04-10 ASSESSMENT — ACTIVITIES OF DAILY LIVING (ADL)
ADLS_ACUITY_SCORE: 20
ADLS_ACUITY_SCORE: 28
ADLS_ACUITY_SCORE: 20
ADLS_ACUITY_SCORE: 20
ADLS_ACUITY_SCORE: 30
ADLS_ACUITY_SCORE: 20
ADLS_ACUITY_SCORE: 28
ADLS_ACUITY_SCORE: 20
ADLS_ACUITY_SCORE: 30

## 2023-04-10 NOTE — ANESTHESIA PROCEDURE NOTES
Central Line/PA Catheter Placement    Pre-Procedure   Staff -        Anesthesiologist:  Sabas Whittaker MD       Performed By: anesthesiologist       Location: OR       Pre-Anesthestic Checklist: patient identified, IV checked, site marked, risks and benefits discussed, informed consent, monitors and equipment checked, pre-op evaluation and at physician/surgeon's request  Timeout:       Correct Patient: Yes        Correct Procedure: Yes        Correct Site: Yes        Correct Position: Yes        Correct Laterality: Yes   Line Placement:   This line was placed Post Induction    Procedure   Procedure: central line and elective       Laterality: right       Insertion Site: internal jugular.       Patient Position: supine and Trendelenburg  Sterile Prep        All elements of maximal sterile barrier technique followed       Patient Prep/Sterile Barriers: draped, hand hygiene, gloves , hat , mask , draped, gown, sterile gel and probe cover       Skin prep: Chloraprep  Insertion/Injection        Technique: ultrasound guided and Seldinger Technique        1. Ultrasound was used to evaluate the access site.       2. Vein evaluated via ultrasound for patency/adequacy.       3. Using real-time ultrasound the needle/catheter was observed entering the artery/vein.       4. Permanent image was captured and entered into the patient's record.       5. The visualized structures were anatomically normal.       6. There were no apparent abnormal pathologic findings.       Introducer Type: 9 Fr, 2-lumen MAC        Type: PA/CVC with Introducer       Catheter Size: 9 Fr       Catheter Length: 11.5       Number of Lumens: double lumen       PA Catheter Type: CCO         Appropriate RV, RA and PA waveforms noted:  Yes            Balloon down at end of the procedure:   Narrative         Secured by: suture       Tegaderm and Biopatch dressing used.       Complications: None apparent,        blood aspirated from all lumens,        All  lumens flushed: Yes       Verification method: Ultrasound and BRE

## 2023-04-10 NOTE — PROGRESS NOTES
Spiritual Health Services Progress Note  [facility], [address]      Provided chaplaincy care with Amelia and her  (Wolf) prior to her surgery, per her request. Briefly discussed her surgery and her concerns for her family, especially Wolf. She also mentioned her sister recently diagnosed with serious illness. Amelia's Yazidi helen is an important source of coping and prayer was shared.    I have no plan for follow up today. Though it was indicated that chaplaincy care follow up would be desired.     Chaplain Aleksander Maldonado, MPH, MDiv, [facility]   [phone]

## 2023-04-10 NOTE — OR NURSING
Neuraxial or Regional Block, Right erector spinae vs paravertebral catheter block performed without complications.  VSS.  Pt tolerated well.  Will continue to monitor.

## 2023-04-10 NOTE — PLAN OF CARE
Admitted/transferred from: OR   Reason for admission/transfer: Post op cardiac surgery care  2 RN skin assessment: completed by Francisco Javier MA and Cammy GUNN   Result of skin assessment and interventions/actions: No major skin issues, mepi on coxyx   Height, weight, drug calc weight: Done  Patient belongings (see Flowsheet):  will bring in items, glasses in safe in room.     ?

## 2023-04-10 NOTE — ANESTHESIA PROCEDURE NOTES
Perioperative BRE Procedure Note    Staff -        Anesthesiologist:  Sabas Whittaker MD       Performed By: anesthesiologist  Preanesthesia Checklist:  Patient identified, IV assessed, risks and benefits discussed, monitors and equipment assessed, procedure being performed at surgeon's request and anesthesia consent obtained.    BRE Probe Insertion    Probe Status PRE Insertion: NO obvious damage  Probe type:  Adult 3D  Bite block used:   Oral Airway  Insertion Technique: Easy, no oropharyngeal manipulation  Insertion complications: None obvious  Billing Report:BRE report by Anesthesiologist (See Separate Report note)  Probe Status POST Removal: NO obvious damage    BRE Report  General Procedure Information  Images for this study have been archived.  Modalities: 2D, 3D, CW Doppler, PW Doppler and Color flow mapping  Echocardiographic and Doppler Measurements  Right Ventricle:  Cavity size dilated.    Hypertrophy present.   Thrombus not present.    Global function mildly impaired.     Left Ventricle:  Cavity size normal.    Hypertrophy present.   Thrombus not present.   Global Function normal.   Ejection Fraction 60%.      Ventricular Regional Function:  1- Basal Anteroseptal:  normal  2- Basal Anterior:  normal  3- Basal Anterolateral:  normal  4- Basal Inferolateral:  normal  5- Basal Inferior:  normal  6- Basal Inferoseptal:  normal  7- Mid Anteroseptal:  normal  8- Mid Anterior:  normal  9- Mid Anterolateral:  normal  10- Mid Inferolateral:  normal  11- Mid Inferior:  normal  12- Mid Inferoseptal:  normal  13- Apical Anterior:  normal  14- Apical Lateral:  normal  15- Apical Inferior:  normal  16- Apical Septal:  normal  17- [place]:  normal    Valves  Aortic Valve: Annulus normal.  Stenosis not present.  Area: 1.63 cm .  Regurgitation +1.  Leaflets normal.  Leaflet motions normal.    Mitral Valve: Annulus normal.  Stenosis not present.  Regurgitation +1.  Leaflets normal.  Leaflet motions normal.    Tricuspid  Valve: Annulus dilated.  Stenosis not present.  Regurgitation +4.  Leaflets calcified and perforated.  Leaflet motions normal.    Pulmonic Valve: Annulus normal.  Stenosis not present.  Regurgitation +1.    Other Valve Findings:  HVF reversal  Aorta: Ascending Aorta: Size normal.  Diameter 2.59 cm.  Dissection not present.  Plaque thickness less than 3 mm.  Mobile plaque not present.    Aortic Arch: Size normal.    Dissection not present.   Plaque thickness less than 3 mm.   Mobile plaque not present.    Descending Aorta: Size normal.    Dissection not present.   Plaque thickness less than 3 mm.   Mobile plaque not present.      Right Atrium:  Size dilated.   Spontaneous echo contrast not present.   Thrombus not present.   Tumor not present.   Device present.   Left Atrium: Size normal.  Spontaneous echo contrast not present.  Thrombus not present.  Tumor not present.  Device not present.    Left atrial appendage normal.   Other Atria Findings:  JOHANNE velocity < 40 cm/s  Atrial Septum: Intra-atrial septal morphology normal.       Ventricular Septum: Intra-ventricular septum morphology normal.      Diastolic Function Measurements:  Diastolic Dysfunction Grade= II.  E=  ms.  A=  ms.  E/A Ratio= .  DT=  ms.  S/D= .  IVRT= ms.  Other Findings:   Pericardium:  normal. Pleural Effusion:  none. Pulmonary Arteries:  normal. Pulmonary Venous Flow:  blunted (decreased) systolic flow. No coronary sinus catheter present.    Post Intervention Findings  Procedure(s) performed:  Tricuspid Valve Repair/Replace. Global function:  Worsened (See comments). Regional wall motion: Unchanged. Surgeon(s) notified of all postintervention findings: Yes.  Tricuspid valve: Valve replaced with bioprosthetic valve. No Paravalvular leak.               Echocardiogram Comments  Echocardiogram comments:   PRE-CPB:  Normal LV systolic function w/ small LV cavity and LVEF 60%. Severely dilated RV with mildly reduced systolic function. Grade II DD. Mild  MR with EROA 0.11 cm2. Trace AI. No AS. Severe TV regurgitation with HVF reversal. No pericardial effusion. No aortic dissection. All findings communicated with surgical team.    POST-CPB:  S/p TVR. Global LV function unchanged. No new RWMAs. RV no longer dilated but systolic function now moderately reduced. Bioprosthetic valve in the tricuspid position with no PVL. Trace residual regurgitation. HVF reversal no longer present. Mean PG 1-2 mmHg. No pericardial effusion. No aortic dissection. All findings communicated with surgical team..

## 2023-04-10 NOTE — PLAN OF CARE
Major Shift Events: Admitted from OR around 1330. No immediate concerns for post op bleeding or hypotension. Able to wean off pressors easily and sedation vacation performed, Pt was able to follow commands well. Emeka will stay at 5ppm through the night, pt will remain intubated and sedated until Emeka wean is over, and plan to extubate in the morning. 500ml LR bolus given for variatablity in artline, low UOP, and softer pressures - tolerated well and effective. UOP has been 30-50 ml/hr, clear urine. CTOP 20ish ml/hr, dark red blood, slowly lightening up and thinning out.  at bedside in the afternoon and he asked appropriate questions and understands POC for the night and the morning.    Plan: Stay intubated overnight, wean Emeka, and extubate in AM.  For vital signs and complete assessments, please see documentation flowsheets.

## 2023-04-10 NOTE — ANESTHESIA PROCEDURE NOTES
Airway       Patient location during procedure: OR       Procedure Start/Stop Times: 4/10/2023 8:05 AM  Staff -        CRNA: Michelle Gonzalez APRN CRNA       Performed By: CRNA  Consent for Airway        Urgency: elective  Indications and Patient Condition       Indications for airway management: mary-procedural       Induction type:intravenous       Mask difficulty assessment: 1 - vent by mask    Final Airway Details       Final airway type: endotracheal airway       Successful airway: ETT - single  Endotracheal Airway Details        ETT size (mm): 7.0       Cuffed: yes       Successful intubation technique: direct laryngoscopy       DL Blade Type: MAC 3       Grade View of Cords: 1       Adjucts: stylet       Position: Right       Measured from: lips       Secured at (cm): 20       Bite block used: None    Post intubation assessment        Placement verified by: capnometry, equal breath sounds and chest rise        Number of attempts at approach: 1       Number of other approaches attempted: 0       Secured with: pink tape       Ease of procedure: easy       Dentition: Intact and Unchanged    Medication(s) Administered   Medication Administration Time: 4/10/2023 8:05 AM

## 2023-04-10 NOTE — BRIEF OP NOTE
[facility]    Brief Operative Note    Pre-operative diagnosis: Tricuspid regurgitation [I07.1]  Post-operative diagnosis Same as pre-operative diagnosis    Procedure: Procedure(s):  MINIMALLY INVASIVE TRICUSPID VALVE REPLACEMENT USING 29MM [place] EPIC VALVE, FEMORAL CANNULATION AND RIGHT GROIN CUTDOWN, CARDIOPULMONARY BYPASS, TRANSESOPHAGEAL ECHOCARDIOGRAM PERFORMED BY ANESTHESIA STAFF  Surgeon: Surgeon(s) and Role:     * Chilango Cope MD - Primary     * Flores Steinberg PA-C  Anesthesia: General with Block   Estimated Blood Loss: 1L    Drains: Right pleural, 2 mediastinal chest tubes, 1 epicardial ventricular pacing wire  Specimens: * No specimens in log *  Findings:   None.  Complications: None.  Implants:   Implant Name Type Inv. Item Serial No.  Lot No. LRB No. Used Action   VALVE MITRAL EPIC STENTED PORCINE 29MM [id number] - [id number] Valve VALVE MITRAL EPIC STENTED PORCINE 29MM [id number] [employer]  N/A 1 Implanted   IMP PATCH PERICARDIUM PHOTOFIX BOVINE 8X14CM [id number] - [id number] Bone/Tissue/Biologic IMP PATCH PERICARDIUM PHOTOFIX BOVINE 8X14CM [id number]  [facility] [id number] N/A 1 Implanted   IMP KIT SUTURE COR-KNOT MINI 4X14MM [id number] - [id number] Metallic Hardware/[place] IMP KIT SUTURE COR-KNOT MINI 4X14MM [id number]  [employer] [id number] N/A 1 Implanted

## 2023-04-10 NOTE — ANESTHESIA POSTPROCEDURE EVALUATION
Patient: Amelia Michel    Procedure: Procedure(s):  MINIMALLY INVASIVE TRICUSPID VALVE REPLACEMENT USING 29MM [place]IL[place] EPIC VALVE, FEMORAL CANNULATION AND RIGHT GROIN CUTDOWN, CARDIOPULMONARY BYPASS, TRANSESOPHAGEAL ECHOCARDIOGRAM PERFORMED BY ANESTHESIA STAFF       Anesthesia Type:  General    Note:  Disposition: ICU; Admission            ICU Sign Out: Anesthesiologist/ICU physician sign out WAS performed   Postop Pain Control: Uneventful            Sign Out: Well controlled pain   PONV: No   Neuro/Psych: Uneventful            Sign Out: Acceptable/Baseline neuro status   Airway/Respiratory: Uneventful            Sign Out: AIRWAY IN SITU/Resp. Support; Acceptable/Baseline resp. status               Airway in situ/Resp. Support: ETT                 Reason: Planned Pre-op   CV/Hemodynamics: Uneventful            Sign Out: Acceptable CV status; No obvious hypovolemia; No obvious fluid overload   Other NRE: NONE   DID A NON-ROUTINE EVENT OCCUR? No    Event details/Postop Comments:  Patient transported from OR to ICU w/ O2, ambu, infusions, emergency medications, emergency airway equipment, inhaled nitric oxide and respiratory therapist. VSS throughout transport and handoff.           Last vitals:  Vitals:    04/10/23 0645 04/10/23 0650 04/10/23 0700   BP: 90/72 96/77 102/71   Pulse: 97 113 90   Resp: 16 16 16   Temp:      SpO2: 99% 99% 98%       Electronically Signed By: Sabas Whittaker MD  April 10, 2023  1:45 PM

## 2023-04-10 NOTE — ANESTHESIA CARE TRANSFER NOTE
Patient: Amelia Michel    Procedure: Procedure(s):  MINIMALLY INVASIVE TRICUSPID VALVE REPLACEMENT USING 29MM [place] NILAM EPIC VALVE, FEMORAL CANNULATION AND RIGHT GROIN CUTDOWN, CARDIOPULMONARY BYPASS, TRANSESOPHAGEAL ECHOCARDIOGRAM PERFORMED BY ANESTHESIA STAFF       Diagnosis: Tricuspid regurgitation [I07.1]  Diagnosis Additional Information: No value filed.    Anesthesia Type:   General     Note:    Oropharynx: oropharynx clear of all foreign objects and ventilatory support  Level of Consciousness: iatrogenic sedation  Patient oxygen source: mechanical ventilation.  Level of Supplemental Oxygen (L/min / FiO2): 100  Independent Airway: airway patency not satisfactory and stable  Dentition: dentition unchanged  Vital Signs Stable: post-procedure vital signs reviewed and stable  Report to RN Given: handoff report given  Patient transferred to: ICU  Comments: Patient transferred to ICU on [facility] with RT.   ICU Handoff: Call for PAUSE to initiate/utilize ICU HANDOFF, Identified Patient, Identified Responsible Provider, Reviewed the Pertinent Medical History, Discussed Surgical Course, Reviewed Intra-OP Anesthesia Management and Issues during Anesthesia, Set Expectations for Post Procedure Period and Allowed Opportunity for Questions and Acknowledgement of Understanding      Vitals:  Vitals Value Taken Time   /62    Temp     Pulse 75 04/10/23 1345   Resp 14 04/10/23 1345   SpO2 99 % 04/10/23 1345   Vitals shown include unvalidated device data.    Electronically Signed By: ELIZABETH Mackay CRNA  April 10, 2023  1:46 PM

## 2023-04-10 NOTE — ANESTHESIA PROCEDURE NOTES
Arterial Line Procedure Note    Pre-Procedure   Staff -        Anesthesiologist:  Sabas Whittaker MD       Performed By: anesthesiologist       Location: OR       Pre-Anesthestic Checklist: patient identified, IV checked, risks and benefits discussed, informed consent, monitors and equipment checked, pre-op evaluation and at physician/surgeon's request  Timeout:       Correct Patient: Yes        Correct Procedure: Yes        Correct Site: Yes        Correct Position: Yes   Line Placement:   This line was placed Pre Induction starting at 4/10/2023 7:50 AM and ending at 4/10/2023 7:56 AM  Procedure   Procedure: arterial line, new line and elective       Laterality: right       Insertion Site: brachial.  Sterile Prep        Standard elements of sterile barrier followed       Skin prep: Chloraprep  Insertion/Injection        Technique: ultrasound guided and Seldinger Technique        1. Ultrasound was used to evaluate the access site.       2. Artery evaluated via ultrasound for patency/adequacy.       3. Using real-time ultrasound the needle/catheter was observed entering the artery/vein.       5. The visualized structures were anatomically normal.       6. There were no apparent abnormal pathologic findings.       Catheter Type/Size: 20 G, 12 cm  Narrative         Secured by: suture       Tegaderm dressing used.       Complications: None apparent,        Arterial waveform: Yes        IBP within 10% of NIBP: Yes

## 2023-04-10 NOTE — OP NOTE
OPERATIVE DATE: 4/10/2023      PRE-OPERATIVE DIAGNOSIS:  1. Severe tricuspid insufficiency  Patient Active Problem List   Diagnosis     HTN (hypertension)     Primary pulmonary hypertension (H)     Hyperlipidemia LDL goal <130     RA (rheumatoid arthritis) (H)     Lymphedema of both lower extremities     Atrial tachycardia (H)     Cardiogenic shock (H)     Critical illness myopathy     Diffuse large B-cell lymphoma of extranodal site (H)     Diffuse large B cell lymphoma (H)     Febrile neutropenia (H)     Physical deconditioning     DLBCL (diffuse large B cell lymphoma) (H)     H/O cardiac arrest     Paroxysmal atrial fibrillation (H)     Atrial flutter, unspecified type (H)     Cervical cancer screening     Mild protein-calorie malnutrition (H)     Embolism and thrombosis of unspecified artery (H)     Thrombocytopenia, unspecified (H)     Heart palpitations     Bradycardia     CHF (congestive heart failure) (H)     Chronic kidney disease, stage 3     Iatrogenic cushingoid features (H)     Steroid-induced osteoporosis     Hyponatremia     Acute on chronic diastolic heart failure (H)     Severe tricuspid regurgitation     Hypervolemia, unspecified hypervolemia type     Edema, unspecified type     Acute heart failure with preserved ejection fraction (HFpEF) (H)       POST-OPERATIVE DIAGNOSIS:  1. Severe tricuspid insufficiency  Patient Active Problem List   Diagnosis     HTN (hypertension)     Primary pulmonary hypertension (H)     Hyperlipidemia LDL goal <130     RA (rheumatoid arthritis) (H)     Lymphedema of both lower extremities     Atrial tachycardia (H)     Cardiogenic shock (H)     Critical illness myopathy     Diffuse large B-cell lymphoma of extranodal site (H)     Diffuse large B cell lymphoma (H)     Febrile neutropenia (H)     Physical deconditioning     DLBCL (diffuse large B cell lymphoma) (H)     H/O cardiac arrest     Paroxysmal atrial fibrillation (H)     Atrial flutter, unspecified type (H)      Cervical cancer screening     Mild protein-calorie malnutrition (H)     Embolism and thrombosis of unspecified artery (H)     Thrombocytopenia, unspecified (H)     Heart palpitations     Bradycardia     CHF (congestive heart failure) (H)     Chronic kidney disease, stage 3     Iatrogenic cushingoid features (H)     Steroid-induced osteoporosis     Hyponatremia     Acute on chronic diastolic heart failure (H)     Severe tricuspid regurgitation     Hypervolemia, unspecified hypervolemia type     Edema, unspecified type     Acute heart failure with preserved ejection fraction (HFpEF) (H)         PROCEDURE:  1. Minimally invasive tricuspid valve replacement - 29mm [place] Epic  2. Repair of right femoral vessels  3. Transesophageal echocardiography    SURGEON: Chilango Cope MD    ASSISTANT: Flores Steinberg PA-C    ANESTHESIA: GETA    ESTIMATED BLOOD LOSS: 1000cc    OPERATIVE FINDINGS:  1. Severely dilated tricuspid annulus with retraction of all valve leaflets    INDICATIONS:  FIDELIA MIDDLETON is a 62 year old adult admitted with severe tricuspid insufficiency.  We were asked to evaluate for surgical repair / replacement.  Risks and benefits of the operation were explained to the patient and their family including, but not limited to, bleeding, infection, stroke and even death.  They understood these risks and agreed to proceed electively.    OPERATIVE REPORT:  The patient was transferred to the operating room and positioned supine on the OR table.  General anesthesia was initiated by the anesthesia team.  Endotracheal intubation and central venous access was performed by anesthesia.  The patients neck, chest, abdomen and bilateral lower extremities were clipped, prepped and draped in sterile fashion.  A pre-procedure time-out was performed confirming the correct patient, correct site and correct procedure.    Right thoracotomy skin incision was made.  The chest was entered and there were minimal adhesions.  Next the  patient was given 400 mg/kg IV heparin.  Transverse skin incision was made in the right inguinal crease.  Femoral vessels were exposed.  Arterial and venous peripheral cannulae were placed.  Cardiopulmonary bypass was initiated with good flows.  Worked for several minutes to free intrapericardial adhesions.  Next the right atriotomy was made.  The valve leaflets were tethered and the annulus was significantly dilated.  I decided to proceed with valve replacement.  Pledgeted valve sutures were placed circumferentially in the annulus.  A 29 mm [place] epic valve was opened.  Valve stitches were passed through the sewing cuff of the valve.  The valve was seated and secured with cor knot device.  Next the atriotomy was closed using 4-0 Prolene.  Patient was then weaned from cardiopulmonary bypass.  Remaining pump blood volume was returned via the arterial cannula.  Heparin was reversed with protamine.  The cannulas were removed and the sites were made hemostatic.  Ventricular pacing wires placed.  2 mediastinal and 1 right pleural chest drain were placed.  Next the thoracotomy was closed using #5 Ethibond pericostal stitches and strata fix sutures to close the incision.  The inguinal incision was closed in layers using strata fix sutures.    The patient was then transferred from the operating bed to an ICU bed and transferred to the ICU in critical, but stable, condition.    All needle, sponge and instrument counts were correct at the end of the case.    Chilango Cope  Cardiothoracic Surgery  Pager: [phone]

## 2023-04-10 NOTE — PHARMACY-ADMISSION MEDICATION HISTORY
Pharmacist Admission Medication History    Admission medication history is complete. The information provided in this note is only as accurate as the sources available at the time of the update.    Medication reconciliation/reorder completed by provider prior to medication history? No    Information Source(s): CareEverywhere/SureScripts, Clinic notes, Med Rec completed on 3/17/2023 via N/A    Pertinent Information: Med rec completeed on 3/17/2023 indicates patient is taking Gabapentin 100 mg po qid rather than 100/100/200     Changes made to PTA medication list:    Added: None    Deleted: None    Changed: None       Allergies reviewed with patient and updates made in EHR: unable to assess - appear up to date    Medications have been reviewed by me and are current to the best of my knowledge and ability.    Medication History Completed By: [facility] 4/10/2023 6:41 PM    PTA Med List   Medication Sig Note Last Dose     acetaminophen (TYLENOL) 500 MG tablet Take 500 mg by mouth every 6 hours as needed for mild pain  4/9/2023 at 2100     alendronate (FOSAMAX) 70 MG tablet Take 1 tablet (70 mg) by mouth every 7 days (Patient taking differently: Take 70 mg by mouth every 7 days Mondays)  Past Week     apixaban ANTICOAGULANT (ELIQUIS ANTICOAGULANT) 5 MG tablet Take 1 tablet (5 mg) by mouth 2 times daily 4/5/2023: On hold for surgery. Pt's LD 4/4 4/4/2023     calcium carbonate 600 mg-vitamin D 400 units (CALTRATE) 600-400 MG-UNIT per tablet Take 2 tablets by mouth daily 4/5/2023: ON HOLD FOR SURGERY Past Month     dexamethasone (DECADRON) 4 MG/ML injection Inject 4 mg IM for adrenal crisis (Patient taking differently: Inject 4 mg as directed once as needed (adrenal crisis))       diltiazem ER COATED BEADS (CARDIZEM CD/CARTIA XT) 240 MG 24 hr capsule Take 1 capsule (240 mg) by mouth daily  4/10/2023 at 0415     docusate sodium (COLACE) 100 MG capsule Take 100 mg by mouth 2 times daily as needed for  constipation 2023: HOLD DOS 2023     empagliflozin (JARDIANCE) 10 MG TABS tablet Take 1 tablet (10 mg) by mouth daily for 360 days 2023: ON HOLD FOR SURGERY, LD 4/3 4/3/2023     [] enoxaparin ANTICOAGULANT (LOVENOX ANTICOAGULANT) 40 MG/0.4ML syringe Inject 0.4 mLs (40 mg) Subcutaneous 2 times daily for 2 days 2023: LD will be 4 am      gabapentin (NEURONTIN) 100 MG capsule TAKE 1 CAPSULE BY MOUTH IN THE MORNING, 1 CAPSULE BY MOUTH IN THE AFTERNOON, AND 2 CAPSULES BY MOUTH AT BEDTIME 4/10/2023: Patient reported on 3/17/2023 as taking 100 mg po qid 4/10/2023 at 0415     hydroxychloroquine (PLAQUENIL) 200 MG tablet TAKE 1 TABLET(200 MG) BY MOUTH DAILY (Patient taking differently: Take 200 mg by mouth every evening)  2023 at 2100     loratadine (CLARITIN) 10 MG tablet Take 10 mg by mouth daily 2023: HOLD DOS 2023 at 18907     magnesium oxide 200 MG TABS Take 200 mg by mouth daily bedtime  Past Month     metolazone (ZAROXOLYN) 2.5 MG tablet Take 1 tablet (2.5 mg) by mouth as needed (ONLY take if instructed to do so by your cardiology team.) 2023: HOLD DOS Unknown     multivitamin, therapeutic with minerals (THERA-VIT-M) TABS tablet Take 1 tablet by mouth daily 2023: ON HOLD FOR SURGERY Past Month     predniSONE (DELTASONE) 1 MG tablet Take 3 tablets (3 mg) by mouth daily - Oral  Past Week     predniSONE (DELTASONE) 5 MG tablet 10 mg when sick for 3 days or until back to baseline for secondary adrenal insufficiency  Unknown     spironolactone (ALDACTONE) 25 MG tablet Take 0.5 tablets (12.5 mg) by mouth daily 2023: HOLD DOS 2023 at 0700     torsemide (DEMADEX) 20 MG tablet Take 1 tablet (20 mg) by mouth 2 times daily 2023: HOLD DOS 2023 at 1500     Vitamin D (Cholecalciferol) 10 MCG (400 UNIT) TABS Take 1 tablet by mouth daily 2023: ON HOLD FOR SURGERY Past Week

## 2023-04-11 ENCOUNTER — APPOINTMENT (OUTPATIENT)
Dept: GENERAL RADIOLOGY | Facility: CLINIC | Age: 63
DRG: 220 | End: 2023-04-11
Attending: THORACIC SURGERY (CARDIOTHORACIC VASCULAR SURGERY)
Payer: COMMERCIAL

## 2023-04-11 ENCOUNTER — ANCILLARY PROCEDURE (OUTPATIENT)
Dept: CARDIOLOGY | Facility: CLINIC | Age: 63
DRG: 220 | End: 2023-04-11
Payer: COMMERCIAL

## 2023-04-11 LAB
ALBUMIN SERPL BCG-MCNC: 3.2 G/DL (ref 3.5–5.2)
ALLEN'S TEST: ABNORMAL
ALP SERPL-CCNC: 73 U/L (ref 35–129)
ALT SERPL W P-5'-P-CCNC: 53 U/L (ref 10–50)
ANION GAP SERPL CALCULATED.3IONS-SCNC: 15 MMOL/L (ref 7–15)
AST SERPL W P-5'-P-CCNC: 113 U/L (ref 10–50)
ATRIAL RATE - MUSE: 76 BPM
BASE EXCESS BLDA CALC-SCNC: 0.2 MMOL/L (ref -9–1.8)
BASE EXCESS BLDA CALC-SCNC: 2.4 MMOL/L (ref -9–1.8)
BILIRUB SERPL-MCNC: 1.5 MG/DL
BUN SERPL-MCNC: 31.2 MG/DL (ref 8–23)
CA-I BLD-MCNC: 5 MG/DL (ref 4.4–5.2)
CALCIUM SERPL-MCNC: 9 MG/DL (ref 8.8–10.2)
CHLORIDE SERPL-SCNC: 101 MMOL/L (ref 98–107)
CREAT SERPL-MCNC: 1.11 MG/DL (ref 0.51–1.17)
DEPRECATED HCO3 PLAS-SCNC: 20 MMOL/L (ref 22–29)
DIASTOLIC BLOOD PRESSURE - MUSE: NORMAL MMHG
ERYTHROCYTE [DISTWIDTH] IN BLOOD BY AUTOMATED COUNT: 13.9 % (ref 10–15)
GFR SERPL CREATININE-BSD FRML MDRD: 56 ML/MIN/1.73M2
GLUCOSE BLDC GLUCOMTR-MCNC: 126 MG/DL (ref 70–99)
GLUCOSE BLDC GLUCOMTR-MCNC: 128 MG/DL (ref 70–99)
GLUCOSE BLDC GLUCOMTR-MCNC: 130 MG/DL (ref 70–99)
GLUCOSE BLDC GLUCOMTR-MCNC: 130 MG/DL (ref 70–99)
GLUCOSE BLDC GLUCOMTR-MCNC: 150 MG/DL (ref 70–99)
GLUCOSE SERPL-MCNC: 145 MG/DL (ref 70–99)
HCO3 BLD-SCNC: 24 MMOL/L (ref 21–28)
HCO3 BLD-SCNC: 25 MMOL/L (ref 21–28)
HCO3 BLD-SCNC: 26 MMOL/L (ref 21–28)
HCO3 BLD-SCNC: 26 MMOL/L (ref 21–28)
HCT VFR BLD AUTO: 34 % (ref 35–53)
HGB BLD-MCNC: 11.6 G/DL (ref 11.7–17.7)
INTERPRETATION ECG - MUSE: NORMAL
LACTATE SERPL-SCNC: 1.1 MMOL/L (ref 0.7–2)
LACTATE SERPL-SCNC: 1.2 MMOL/L (ref 0.7–2)
MAGNESIUM SERPL-MCNC: 2.1 MG/DL (ref 1.7–2.3)
MAGNESIUM SERPL-MCNC: 2.4 MG/DL (ref 1.7–2.3)
MCH RBC QN AUTO: 36.1 PG (ref 26.5–33)
MCHC RBC AUTO-ENTMCNC: 34.1 G/DL (ref 31.5–36.5)
MCV RBC AUTO: 106 FL (ref 78–100)
MDC_IDC_EPISODE_DTM: NORMAL
MDC_IDC_EPISODE_DURATION: 0 S
MDC_IDC_EPISODE_DURATION: 0 S
MDC_IDC_EPISODE_DURATION: 1 S
MDC_IDC_EPISODE_DURATION: 10 S
MDC_IDC_EPISODE_DURATION: 100 S
MDC_IDC_EPISODE_DURATION: 108 S
MDC_IDC_EPISODE_DURATION: 109 S
MDC_IDC_EPISODE_DURATION: 110 S
MDC_IDC_EPISODE_DURATION: 115 S
MDC_IDC_EPISODE_DURATION: 118 S
MDC_IDC_EPISODE_DURATION: 119 S
MDC_IDC_EPISODE_DURATION: 120 S
MDC_IDC_EPISODE_DURATION: 129 S
MDC_IDC_EPISODE_DURATION: 13 S
MDC_IDC_EPISODE_DURATION: 147 S
MDC_IDC_EPISODE_DURATION: 148 S
MDC_IDC_EPISODE_DURATION: 162 S
MDC_IDC_EPISODE_DURATION: 1675 S
MDC_IDC_EPISODE_DURATION: 170 S
MDC_IDC_EPISODE_DURATION: 196 S
MDC_IDC_EPISODE_DURATION: 2 S
MDC_IDC_EPISODE_DURATION: 210 S
MDC_IDC_EPISODE_DURATION: 217 S
MDC_IDC_EPISODE_DURATION: 24 S
MDC_IDC_EPISODE_DURATION: 24 S
MDC_IDC_EPISODE_DURATION: 25 S
MDC_IDC_EPISODE_DURATION: 279 S
MDC_IDC_EPISODE_DURATION: 283 S
MDC_IDC_EPISODE_DURATION: 3 S
MDC_IDC_EPISODE_DURATION: 31 S
MDC_IDC_EPISODE_DURATION: 331 S
MDC_IDC_EPISODE_DURATION: 34 S
MDC_IDC_EPISODE_DURATION: 35 S
MDC_IDC_EPISODE_DURATION: 37 S
MDC_IDC_EPISODE_DURATION: 4 S
MDC_IDC_EPISODE_DURATION: 42 S
MDC_IDC_EPISODE_DURATION: 43 S
MDC_IDC_EPISODE_DURATION: 447 S
MDC_IDC_EPISODE_DURATION: 48 S
MDC_IDC_EPISODE_DURATION: 51 S
MDC_IDC_EPISODE_DURATION: 51 S
MDC_IDC_EPISODE_DURATION: 62 S
MDC_IDC_EPISODE_DURATION: 62 S
MDC_IDC_EPISODE_DURATION: 64 S
MDC_IDC_EPISODE_DURATION: 65 S
MDC_IDC_EPISODE_DURATION: 669 S
MDC_IDC_EPISODE_DURATION: 67 S
MDC_IDC_EPISODE_DURATION: 70 S
MDC_IDC_EPISODE_DURATION: 74 S
MDC_IDC_EPISODE_DURATION: 769 S
MDC_IDC_EPISODE_DURATION: 79 S
MDC_IDC_EPISODE_DURATION: 82 S
MDC_IDC_EPISODE_DURATION: 82 S
MDC_IDC_EPISODE_DURATION: 86 S
MDC_IDC_EPISODE_DURATION: 89 S
MDC_IDC_EPISODE_DURATION: 96 S
MDC_IDC_EPISODE_ID: NORMAL
MDC_IDC_EPISODE_TYPE: NORMAL
MDC_IDC_LEAD_IMPLANT_DT: NORMAL
MDC_IDC_LEAD_IMPLANT_DT: NORMAL
MDC_IDC_LEAD_LOCATION: NORMAL
MDC_IDC_LEAD_LOCATION: NORMAL
MDC_IDC_LEAD_LOCATION_DETAIL_1: NORMAL
MDC_IDC_LEAD_LOCATION_DETAIL_1: NORMAL
MDC_IDC_LEAD_MFG: NORMAL
MDC_IDC_LEAD_MFG: NORMAL
MDC_IDC_LEAD_MODEL: NORMAL
MDC_IDC_LEAD_MODEL: NORMAL
MDC_IDC_LEAD_POLARITY_TYPE: NORMAL
MDC_IDC_LEAD_POLARITY_TYPE: NORMAL
MDC_IDC_LEAD_SERIAL: NORMAL
MDC_IDC_LEAD_SERIAL: NORMAL
MDC_IDC_LEAD_SPECIAL_FUNCTION: NORMAL
MDC_IDC_LEAD_SPECIAL_FUNCTION: NORMAL
MDC_IDC_MSMT_BATTERY_DTM: NORMAL
MDC_IDC_MSMT_BATTERY_REMAINING_LONGEVITY: 126 MO
MDC_IDC_MSMT_BATTERY_RRT_TRIGGER: 2.62
MDC_IDC_MSMT_BATTERY_STATUS: NORMAL
MDC_IDC_MSMT_BATTERY_VOLTAGE: 3.02 V
MDC_IDC_MSMT_LEADCHNL_RA_IMPEDANCE_VALUE: 285 OHM
MDC_IDC_MSMT_LEADCHNL_RA_IMPEDANCE_VALUE: 342 OHM
MDC_IDC_MSMT_LEADCHNL_RA_PACING_THRESHOLD_AMPLITUDE: 1.5 V
MDC_IDC_MSMT_LEADCHNL_RA_PACING_THRESHOLD_PULSEWIDTH: 0.4 MS
MDC_IDC_MSMT_LEADCHNL_RV_IMPEDANCE_VALUE: 342 OHM
MDC_IDC_MSMT_LEADCHNL_RV_IMPEDANCE_VALUE: 437 OHM
MDC_IDC_MSMT_LEADCHNL_RV_PACING_THRESHOLD_AMPLITUDE: 0.75 V
MDC_IDC_MSMT_LEADCHNL_RV_PACING_THRESHOLD_PULSEWIDTH: 0.4 MS
MDC_IDC_MSMT_LEADCHNL_RV_SENSING_INTR_AMPL: 4.9 MV
MDC_IDC_PG_IMPLANT_DTM: NORMAL
MDC_IDC_PG_MFG: NORMAL
MDC_IDC_PG_MODEL: NORMAL
MDC_IDC_PG_SERIAL: NORMAL
MDC_IDC_PG_TYPE: NORMAL
MDC_IDC_SESS_CLINIC_NAME: NORMAL
MDC_IDC_SESS_DTM: NORMAL
MDC_IDC_SESS_TYPE: NORMAL
MDC_IDC_SET_BRADY_AT_MODE_SWITCH_RATE: 171 {BEATS}/MIN
MDC_IDC_SET_BRADY_HYSTRATE: NORMAL
MDC_IDC_SET_BRADY_LOWRATE: 75 {BEATS}/MIN
MDC_IDC_SET_BRADY_MAX_SENSOR_RATE: 130 {BEATS}/MIN
MDC_IDC_SET_BRADY_MAX_TRACKING_RATE: 130 {BEATS}/MIN
MDC_IDC_SET_BRADY_MODE: NORMAL
MDC_IDC_SET_BRADY_PAV_DELAY_LOW: 180 MS
MDC_IDC_SET_BRADY_SAV_DELAY_LOW: 150 MS
MDC_IDC_SET_LEADCHNL_RA_PACING_AMPLITUDE: 3.5 V
MDC_IDC_SET_LEADCHNL_RA_PACING_ANODE_ELECTRODE_1: NORMAL
MDC_IDC_SET_LEADCHNL_RA_PACING_ANODE_LOCATION_1: NORMAL
MDC_IDC_SET_LEADCHNL_RA_PACING_CAPTURE_MODE: NORMAL
MDC_IDC_SET_LEADCHNL_RA_PACING_CATHODE_ELECTRODE_1: NORMAL
MDC_IDC_SET_LEADCHNL_RA_PACING_CATHODE_LOCATION_1: NORMAL
MDC_IDC_SET_LEADCHNL_RA_PACING_POLARITY: NORMAL
MDC_IDC_SET_LEADCHNL_RA_PACING_PULSEWIDTH: 0.4 MS
MDC_IDC_SET_LEADCHNL_RA_SENSING_ANODE_ELECTRODE_1: NORMAL
MDC_IDC_SET_LEADCHNL_RA_SENSING_ANODE_LOCATION_1: NORMAL
MDC_IDC_SET_LEADCHNL_RA_SENSING_CATHODE_ELECTRODE_1: NORMAL
MDC_IDC_SET_LEADCHNL_RA_SENSING_CATHODE_LOCATION_1: NORMAL
MDC_IDC_SET_LEADCHNL_RA_SENSING_POLARITY: NORMAL
MDC_IDC_SET_LEADCHNL_RA_SENSING_SENSITIVITY: 0.3 MV
MDC_IDC_SET_LEADCHNL_RV_PACING_AMPLITUDE: 2 V
MDC_IDC_SET_LEADCHNL_RV_PACING_ANODE_ELECTRODE_1: NORMAL
MDC_IDC_SET_LEADCHNL_RV_PACING_ANODE_LOCATION_1: NORMAL
MDC_IDC_SET_LEADCHNL_RV_PACING_CAPTURE_MODE: NORMAL
MDC_IDC_SET_LEADCHNL_RV_PACING_CATHODE_ELECTRODE_1: NORMAL
MDC_IDC_SET_LEADCHNL_RV_PACING_CATHODE_LOCATION_1: NORMAL
MDC_IDC_SET_LEADCHNL_RV_PACING_POLARITY: NORMAL
MDC_IDC_SET_LEADCHNL_RV_PACING_PULSEWIDTH: 0.4 MS
MDC_IDC_SET_LEADCHNL_RV_SENSING_ANODE_ELECTRODE_1: NORMAL
MDC_IDC_SET_LEADCHNL_RV_SENSING_ANODE_LOCATION_1: NORMAL
MDC_IDC_SET_LEADCHNL_RV_SENSING_CATHODE_ELECTRODE_1: NORMAL
MDC_IDC_SET_LEADCHNL_RV_SENSING_CATHODE_LOCATION_1: NORMAL
MDC_IDC_SET_LEADCHNL_RV_SENSING_POLARITY: NORMAL
MDC_IDC_SET_LEADCHNL_RV_SENSING_SENSITIVITY: 0.9 MV
MDC_IDC_SET_ZONE_DETECTION_INTERVAL: 350 MS
MDC_IDC_SET_ZONE_DETECTION_INTERVAL: 400 MS
MDC_IDC_SET_ZONE_TYPE: NORMAL
MDC_IDC_STAT_AT_BURDEN_PERCENT: 0 %
MDC_IDC_STAT_AT_DTM_END: NORMAL
MDC_IDC_STAT_AT_DTM_START: NORMAL
MDC_IDC_STAT_BRADY_AP_VP_PERCENT: 0 %
MDC_IDC_STAT_BRADY_AP_VS_PERCENT: 0 %
MDC_IDC_STAT_BRADY_AS_VP_PERCENT: 21.11 %
MDC_IDC_STAT_BRADY_AS_VS_PERCENT: 78.89 %
MDC_IDC_STAT_BRADY_DTM_END: NORMAL
MDC_IDC_STAT_BRADY_DTM_START: NORMAL
MDC_IDC_STAT_BRADY_RA_PERCENT_PACED: 0 %
MDC_IDC_STAT_BRADY_RV_PERCENT_PACED: 21.11 %
MDC_IDC_STAT_EPISODE_RECENT_COUNT: 0
MDC_IDC_STAT_EPISODE_RECENT_COUNT: 183
MDC_IDC_STAT_EPISODE_RECENT_COUNT: 2568
MDC_IDC_STAT_EPISODE_RECENT_COUNT_DTM_END: NORMAL
MDC_IDC_STAT_EPISODE_RECENT_COUNT_DTM_START: NORMAL
MDC_IDC_STAT_EPISODE_TOTAL_COUNT: 3691
MDC_IDC_STAT_EPISODE_TOTAL_COUNT: 393
MDC_IDC_STAT_EPISODE_TOTAL_COUNT: NORMAL
MDC_IDC_STAT_EPISODE_TOTAL_COUNT_DTM_END: NORMAL
MDC_IDC_STAT_EPISODE_TOTAL_COUNT_DTM_START: NORMAL
MDC_IDC_STAT_EPISODE_TYPE: NORMAL
O2/TOTAL GAS SETTING VFR VENT: 28 %
O2/TOTAL GAS SETTING VFR VENT: 40 %
OXYHGB MFR BLD: 97 % (ref 92–100)
OXYHGB MFR BLD: 98 % (ref 92–100)
OXYHGB MFR BLD: 98 % (ref 92–100)
OXYHGB MFR BLD: 99 % (ref 92–100)
P AXIS - MUSE: NORMAL DEGREES
PCO2 BLD: 28 MM HG (ref 35–45)
PCO2 BLD: 36 MM HG (ref 35–45)
PCO2 BLD: 37 MM HG (ref 35–45)
PCO2 BLD: 37 MM HG (ref 35–45)
PH BLD: 7.43 [PH] (ref 7.35–7.45)
PH BLD: 7.46 [PH] (ref 7.35–7.45)
PH BLD: 7.47 [PH] (ref 7.35–7.45)
PH BLD: 7.55 [PH] (ref 7.35–7.45)
PHOSPHATE SERPL-MCNC: 5.4 MG/DL (ref 2.5–4.5)
PHOSPHATE SERPL-MCNC: 6.1 MG/DL (ref 2.5–4.5)
PLATELET # BLD AUTO: 73 10E3/UL (ref 150–450)
PO2 BLD: 106 MM HG (ref 80–105)
PO2 BLD: 150 MM HG (ref 80–105)
PO2 BLD: 169 MM HG (ref 80–105)
PO2 BLD: 183 MM HG (ref 80–105)
POTASSIUM SERPL-SCNC: 3.9 MMOL/L (ref 3.4–5.3)
POTASSIUM SERPL-SCNC: 4.2 MMOL/L (ref 3.4–5.3)
POTASSIUM SERPL-SCNC: 4.2 MMOL/L (ref 3.4–5.3)
POTASSIUM SERPL-SCNC: 4.4 MMOL/L (ref 3.4–5.3)
PR INTERVAL - MUSE: NORMAL MS
PROT SERPL-MCNC: 5.3 G/DL (ref 6.4–8.3)
QRS DURATION - MUSE: 152 MS
QT - MUSE: 546 MS
QTC - MUSE: 609 MS
R AXIS - MUSE: 149 DEGREES
RBC # BLD AUTO: 3.21 10E6/UL (ref 3.8–5.9)
SODIUM SERPL-SCNC: 136 MMOL/L (ref 136–145)
SYSTOLIC BLOOD PRESSURE - MUSE: NORMAL MMHG
T AXIS - MUSE: -88 DEGREES
VENTRICULAR RATE- MUSE: 75 BPM
WBC # BLD AUTO: 8.9 10E3/UL (ref 4–11)

## 2023-04-11 PROCEDURE — 250N000013 HC RX MED GY IP 250 OP 250 PS 637: Performed by: SURGERY

## 2023-04-11 PROCEDURE — 82330 ASSAY OF CALCIUM: CPT | Performed by: PHYSICIAN ASSISTANT

## 2023-04-11 PROCEDURE — 93005 ELECTROCARDIOGRAM TRACING: CPT

## 2023-04-11 PROCEDURE — 999N000157 HC STATISTIC RCP TIME EA 10 MIN

## 2023-04-11 PROCEDURE — 83735 ASSAY OF MAGNESIUM: CPT | Performed by: STUDENT IN AN ORGANIZED HEALTH CARE EDUCATION/TRAINING PROGRAM

## 2023-04-11 PROCEDURE — 93010 ELECTROCARDIOGRAM REPORT: CPT | Performed by: INTERNAL MEDICINE

## 2023-04-11 PROCEDURE — 83735 ASSAY OF MAGNESIUM: CPT | Performed by: SURGERY

## 2023-04-11 PROCEDURE — 271N000002 HC RX 271

## 2023-04-11 PROCEDURE — 250N000013 HC RX MED GY IP 250 OP 250 PS 637: Performed by: STUDENT IN AN ORGANIZED HEALTH CARE EDUCATION/TRAINING PROGRAM

## 2023-04-11 PROCEDURE — 93280 PM DEVICE PROGR EVAL DUAL: CPT

## 2023-04-11 PROCEDURE — 82805 BLOOD GASES W/O2 SATURATION: CPT | Performed by: PHYSICIAN ASSISTANT

## 2023-04-11 PROCEDURE — 250N000011 HC RX IP 250 OP 636

## 2023-04-11 PROCEDURE — 83605 ASSAY OF LACTIC ACID: CPT | Performed by: STUDENT IN AN ORGANIZED HEALTH CARE EDUCATION/TRAINING PROGRAM

## 2023-04-11 PROCEDURE — 71045 X-RAY EXAM CHEST 1 VIEW: CPT

## 2023-04-11 PROCEDURE — 84132 ASSAY OF SERUM POTASSIUM: CPT | Performed by: SURGERY

## 2023-04-11 PROCEDURE — 82805 BLOOD GASES W/O2 SATURATION: CPT | Performed by: STUDENT IN AN ORGANIZED HEALTH CARE EDUCATION/TRAINING PROGRAM

## 2023-04-11 PROCEDURE — 84100 ASSAY OF PHOSPHORUS: CPT | Performed by: STUDENT IN AN ORGANIZED HEALTH CARE EDUCATION/TRAINING PROGRAM

## 2023-04-11 PROCEDURE — 85027 COMPLETE CBC AUTOMATED: CPT | Performed by: STUDENT IN AN ORGANIZED HEALTH CARE EDUCATION/TRAINING PROGRAM

## 2023-04-11 PROCEDURE — 250N000013 HC RX MED GY IP 250 OP 250 PS 637: Performed by: PHYSICIAN ASSISTANT

## 2023-04-11 PROCEDURE — 250N000011 HC RX IP 250 OP 636: Performed by: PHYSICIAN ASSISTANT

## 2023-04-11 PROCEDURE — 84100 ASSAY OF PHOSPHORUS: CPT | Performed by: SURGERY

## 2023-04-11 PROCEDURE — 999N000253 HC STATISTIC WEANING TRIALS

## 2023-04-11 PROCEDURE — 258N000003 HC RX IP 258 OP 636: Performed by: THORACIC SURGERY (CARDIOTHORACIC VASCULAR SURGERY)

## 2023-04-11 PROCEDURE — 250N000011 HC RX IP 250 OP 636: Performed by: STUDENT IN AN ORGANIZED HEALTH CARE EDUCATION/TRAINING PROGRAM

## 2023-04-11 PROCEDURE — 71045 X-RAY EXAM CHEST 1 VIEW: CPT | Mod: 26 | Performed by: RADIOLOGY

## 2023-04-11 PROCEDURE — 94003 VENT MGMT INPAT SUBQ DAY: CPT

## 2023-04-11 PROCEDURE — 84132 ASSAY OF SERUM POTASSIUM: CPT | Performed by: PHYSICIAN ASSISTANT

## 2023-04-11 PROCEDURE — 93280 PM DEVICE PROGR EVAL DUAL: CPT | Mod: 26 | Performed by: INTERNAL MEDICINE

## 2023-04-11 PROCEDURE — 94799 UNLISTED PULMONARY SVC/PX: CPT

## 2023-04-11 PROCEDURE — 99232 SBSQ HOSP IP/OBS MODERATE 35: CPT | Performed by: NURSE PRACTITIONER

## 2023-04-11 PROCEDURE — 80053 COMPREHEN METABOLIC PANEL: CPT | Performed by: STUDENT IN AN ORGANIZED HEALTH CARE EDUCATION/TRAINING PROGRAM

## 2023-04-11 PROCEDURE — 999N000015 HC STATISTIC ARTERIAL MONITORING DAILY

## 2023-04-11 PROCEDURE — 250N000011 HC RX IP 250 OP 636: Performed by: THORACIC SURGERY (CARDIOTHORACIC VASCULAR SURGERY)

## 2023-04-11 PROCEDURE — 99233 SBSQ HOSP IP/OBS HIGH 50: CPT | Mod: 24 | Performed by: STUDENT IN AN ORGANIZED HEALTH CARE EDUCATION/TRAINING PROGRAM

## 2023-04-11 PROCEDURE — 250N000012 HC RX MED GY IP 250 OP 636 PS 637: Performed by: THORACIC SURGERY (CARDIOTHORACIC VASCULAR SURGERY)

## 2023-04-11 PROCEDURE — 200N000002 HC R&B ICU UMMC

## 2023-04-11 RX ORDER — NICOTINE POLACRILEX 4 MG
15-30 LOZENGE BUCCAL
Status: DISCONTINUED | OUTPATIENT
Start: 2023-04-11 | End: 2023-04-11

## 2023-04-11 RX ORDER — PREDNISONE 5 MG/ML
3 SOLUTION ORAL DAILY
Status: DISCONTINUED | OUTPATIENT
Start: 2023-04-11 | End: 2023-04-11

## 2023-04-11 RX ORDER — DEXTROSE MONOHYDRATE 25 G/50ML
25-50 INJECTION, SOLUTION INTRAVENOUS
Status: DISCONTINUED | OUTPATIENT
Start: 2023-04-11 | End: 2023-04-11

## 2023-04-11 RX ORDER — FUROSEMIDE 10 MG/ML
20 INJECTION INTRAMUSCULAR; INTRAVENOUS ONCE
Status: COMPLETED | OUTPATIENT
Start: 2023-04-11 | End: 2023-04-11

## 2023-04-11 RX ORDER — ATENOLOL 25 MG/1
25 TABLET ORAL DAILY
Status: COMPLETED | OUTPATIENT
Start: 2023-04-11 | End: 2023-04-11

## 2023-04-11 RX ADMIN — VANCOMYCIN HYDROCHLORIDE 750 MG: 1 INJECTION, POWDER, LYOPHILIZED, FOR SOLUTION INTRAVENOUS at 08:12

## 2023-04-11 RX ADMIN — HEPARIN SODIUM 5000 UNITS: 5000 INJECTION, SOLUTION INTRAVENOUS; SUBCUTANEOUS at 20:07

## 2023-04-11 RX ADMIN — ACETAMINOPHEN 975 MG: 325 TABLET ORAL at 05:08

## 2023-04-11 RX ADMIN — ASPIRIN 81 MG 81 MG: 81 TABLET ORAL at 07:38

## 2023-04-11 RX ADMIN — FUROSEMIDE 20 MG: 10 INJECTION, SOLUTION INTRAVENOUS at 15:05

## 2023-04-11 RX ADMIN — HYDROMORPHONE HYDROCHLORIDE 0.2 MG: 0.2 INJECTION, SOLUTION INTRAMUSCULAR; INTRAVENOUS; SUBCUTANEOUS at 16:12

## 2023-04-11 RX ADMIN — HEPARIN SODIUM 5000 UNITS: 5000 INJECTION, SOLUTION INTRAVENOUS; SUBCUTANEOUS at 11:39

## 2023-04-11 RX ADMIN — ACETAMINOPHEN 975 MG: 325 TABLET ORAL at 12:48

## 2023-04-11 RX ADMIN — Medication 10 MG: at 21:19

## 2023-04-11 RX ADMIN — Medication 40 MG: at 07:39

## 2023-04-11 RX ADMIN — ACETAMINOPHEN 975 MG: 325 TABLET ORAL at 21:19

## 2023-04-11 RX ADMIN — PROPOFOL 30 MCG/KG/MIN: 10 INJECTION, EMULSION INTRAVENOUS at 08:30

## 2023-04-11 RX ADMIN — MUPIROCIN 0.5 G: 20 OINTMENT TOPICAL at 07:41

## 2023-04-11 RX ADMIN — ATENOLOL 25 MG: 25 TABLET ORAL at 21:52

## 2023-04-11 RX ADMIN — POLYETHYLENE GLYCOL 3350 17 G: 17 POWDER, FOR SOLUTION ORAL at 07:38

## 2023-04-11 RX ADMIN — HYDROMORPHONE HYDROCHLORIDE 0.2 MG: 0.2 INJECTION, SOLUTION INTRAMUSCULAR; INTRAVENOUS; SUBCUTANEOUS at 18:42

## 2023-04-11 RX ADMIN — CLINDAMYCIN IN 5 PERCENT DEXTROSE 900 MG: 18 INJECTION, SOLUTION INTRAVENOUS at 07:39

## 2023-04-11 RX ADMIN — SENNOSIDES AND DOCUSATE SODIUM 1 TABLET: 50; 8.6 TABLET ORAL at 07:38

## 2023-04-11 RX ADMIN — MUPIROCIN 0.5 G: 20 OINTMENT TOPICAL at 20:07

## 2023-04-11 RX ADMIN — Medication 3 MG: at 12:48

## 2023-04-11 RX ADMIN — SENNOSIDES AND DOCUSATE SODIUM 1 TABLET: 50; 8.6 TABLET ORAL at 20:07

## 2023-04-11 RX ADMIN — Medication 500 MG: at 18:54

## 2023-04-11 RX ADMIN — INSULIN ASPART 1 UNITS: 100 INJECTION, SOLUTION INTRAVENOUS; SUBCUTANEOUS at 20:11

## 2023-04-11 ASSESSMENT — ACTIVITIES OF DAILY LIVING (ADL)
ADLS_ACUITY_SCORE: 27
ADLS_ACUITY_SCORE: 30
ADLS_ACUITY_SCORE: 30
ADLS_ACUITY_SCORE: 28
ADLS_ACUITY_SCORE: 30
ADLS_ACUITY_SCORE: 30
ADLS_ACUITY_SCORE: 27
DEPENDENT_IADLS:: INDEPENDENT
ADLS_ACUITY_SCORE: 30
ADLS_ACUITY_SCORE: 28

## 2023-04-11 NOTE — PLAN OF CARE
Major Shift Events:  Pt remained sedated throughout shift, did require increases sedation and analgesia to remain comfortable and not reach for ETT. Pt moved all extremities and followed commands when sedation was lowered. Pt received 500 cc bolus of LR x2 and restarted levo at 0.02 to maintain maps >65. Remained paced at 75 from perm pacemaker. Pt remained intubated with vent settings CMV 40% 14  TV and 5 peep, Ni02 remained on at 5 ppm for shift. Minimal secretions. U/O declined during shift ~25 cc/hr. Bowel sounds hypo active. CT output was normal and got lighter throughout shift.     Plan: Wean Ni02 and work towards extubation.    For vital signs and complete assessments, please see documentation flowsheets.

## 2023-04-11 NOTE — PROGRESS NOTES
[facility], Procedure Note          Extubation:       Amelia Michel  MRN# [id number]   April 11, 2023, 1:40 PM         Patient extubated at: April 11, 2023, 1:40 PM   Supplemental Oxygen: Via nasal cannula at 3 liters per minute   Cough: The cough is strong   Secretion Mode: Able to clear   Secretion Amount: Scant amount, thin and clear in color   Respiratory Exam:: Breath sounds: equal and clear     Location: all lobes   Skin Exam:: Patient color: natural   Patient Status: Currently appears comfortable   Arterial Blood Gasses: pH Arterial   Date Value   04/11/2023 7.55 (H)   06/04/2017 7.44 pH     pO2 Arterial (mm Hg)   Date Value   04/11/2023 169 (H)   06/04/2017 94     pCO2 Arterial (mm Hg)   Date Value   04/11/2023 28 (L)   06/04/2017 45     Bicarbonate Arterial (mmol/L)   Date Value   04/11/2023 24   06/04/2017 30 (H)            Recorded by Alonzo Reynolds, RT

## 2023-04-11 NOTE — PROGRESS NOTES
"Pain Service Progress Note  [facility]  Date: 04/11/2023       Patient Name: Amelia Michel  MRN: [id number]  Age: 62 year old  Sex: adult      Assessment:  Amelia Michel is a 62 year old female with PMH significant for cardiac arrest (2017) NSVT s/p PVC ablation (11/22), HFpEF, mildly reduced RV function, afib/aflutter, CKD III, severe tricuspid regurgitation    Procedure: s/p mini thoracotomy and TVR (using 29 mm porcine valve)    Date of Surgery: 4/10/23     Date of Catheter Placement: 4/10/23     Plan/Recommendations:  1. Regional Anesthesia/Analgesia  -Continuous Catheter Type/Site: right erector spinae (ES) T5-6  Infusate: ropivacaine 0.2%  Programmed Intermittent Bolus (PIB) at 7 mL Q60 min    Plan to maintain catheter while chest tube in place, max of 7 days    2. Anticoagulation  -Please contact Inpatient Pain Service before ordering or making any anticoagulation changes       3. Multimodal Analgesia  - per primary service    Pain Service will continue to follow.    Discussed with attending anesthesiologist    ELIZABETH Canchola CNP  04/11/2023     Overnight Events: No acute events, on nitric, remains intubated, on vent    Tubes/Drains: Yes  3 chest tubes    Subjective: Intubated, sedated, follows commands and assists with repositioning to inspect catheter site. Appears comfortable at rest and with repositioning  Symptoms of LAST: No      Diet: Advance Diet as Tolerated: Clear Liquid Diet    Relevant Labs:  Recent Labs   Lab Test 04/11/23  0350 04/10/23  1327   INR  --  1.71*   PLT 73* 76*   PTT  --  61*   BUN 31.2* 24.7*       Physical Exam:  Vitals: /71   Pulse 75   Temp 98.8  F (37.1  C) (Bladder)   Resp 16   Ht 1.448 m (4' 9\")   Wt 49.2 kg (108 lb 7.5 oz)   SpO2 100%   BMI 23.47 kg/m      Physical Exam:   Orientation:  Alert, oriented: No  Sedation: Yes    Motor Examination:  5/5 Strength in lower extremities: Yes     Catheter Site:   Catheter entry " site is clean/dry/intact: Yes    Tender: No      Relevant Medications:  Current Pain Medications:  Medications related to Pain Management (From now, onward)    Start     Dose/Rate Route Frequency Ordered Stop    04/13/23 0000  acetaminophen (TYLENOL) tablet 650 mg         650 mg Oral EVERY 4 HOURS PRN 04/10/23 1309      04/11/23 0800  polyethylene glycol (MIRALAX) Packet 17 g         17 g Oral DAILY 04/10/23 1309      04/11/23 0800  aspirin (ASA) chewable tablet 81 mg         81 mg Oral or NG Tube DAILY 04/10/23 1309      04/10/23 2000  senna-docusate (SENOKOT-S/PERICOLACE) 8.6-50 MG per tablet 1 tablet         1 tablet Oral 2 TIMES DAILY 04/10/23 1309      04/10/23 1330  dexmedetomidine (PRECEDEX) 4 mcg/mL in NS infusion         0.2-0.7 mcg/kg/hr × 49.2 kg (Dosing Weight)  2.5-8.6 mL/hr  Intravenous CONTINUOUS 04/10/23 1309      04/10/23 1330  acetaminophen (TYLENOL) tablet 975 mg         975 mg Oral EVERY 8 HOURS 04/10/23 1309 04/13/23 1329    04/10/23 1330  propofol (DIPRIVAN) infusion        See Hyperspace for full Linked Orders Report.    5-75 mcg/kg/min × 49.2 kg (Dosing Weight)  1.5-22.1 mL/hr  Intravenous CONTINUOUS 04/10/23 1324      04/10/23 1330  fentaNYL (SUBLIMAZE) infusion          mcg/hr  0.5-4 mL/hr  Intravenous CONTINUOUS 04/10/23 1324      04/10/23 1320  propofol (DIPRIVAN) bolus from infusion pump 10 mg        See Hyperspace for full Linked Orders Report.    10 mg Intravenous EVERY 15 MIN PRN 04/10/23 1324      04/10/23 1309  magnesium hydroxide (MILK OF MAGNESIA) suspension 30 mL         30 mL Oral DAILY PRN 04/10/23 1309      04/10/23 1309  bisacodyl (DULCOLAX) suppository 10 mg         10 mg Rectal DAILY PRN 04/10/23 1309      04/10/23 1309  HYDROmorphone (DILAUDID) injection 0.2 mg        See Hyperspace for full Linked Orders Report.    0.2 mg Intravenous EVERY 2 HOURS PRN 04/10/23 1309      04/10/23 1309  HYDROmorphone (DILAUDID) injection 0.4 mg        See Hyperspace for full Linked  "Orders Report.    0.4 mg Intravenous EVERY 2 HOURS PRN 04/10/23 1309      04/10/23 1309  oxyCODONE (ROXICODONE) tablet 5 mg        See Hyperspace for full Linked Orders Report.    5 mg Oral EVERY 4 HOURS PRN 04/10/23 1309      04/10/23 1309  oxyCODONE IR (ROXICODONE) tablet 10 mg        See Hyperspace for full Linked Orders Report.    10 mg Oral EVERY 4 HOURS PRN 04/10/23 1309      04/10/23 1309  lidocaine 1 % 0.1-1 mL         0.1-1 mL Other EVERY 1 HOUR PRN 04/10/23 1309      04/10/23 1309  lidocaine (LMX4) cream          Topical EVERY 1 HOUR PRN 04/10/23 1309      04/10/23 1030  ropivacaine 0.2% in NS perineural infusion (simple)          Perineural Continuous Nerve Block 04/10/23 1023            Primary Service Contacted with Recommendations? Yes      Please see A&P for additional details of medical decision making.      Acute Inpatient Pain Service [facility]  Hours of pain coverage 24/7   Page via Amcom- Please Page the Pain Team Via Amcom: \"PAIN MANAGEMENT ACUTE INPATIENT/ [facility]\"             "

## 2023-04-11 NOTE — PROGRESS NOTES
CLINICAL NUTRITION SERVICES - BRIEF NOTE    Received provider consult for nutrition education with comments post op cardiovascular surgery (automatic consult on post-op order set). S/p mini thoracotomy and TVR on 4/10. Nutrition education not indicated.    RD will follow per LOS protocol or if re-consulted.     Cammy Molina MS, RD, LD  4E (CVICU) RD pager: [phone]  Ascom: [id number]  Weekend/Holiday RD pager: [phone]

## 2023-04-11 NOTE — CONSULTS
Care Management Initial Consult    General Information  Assessment completed with: Spouse or significant other, Wolf  Type of CM/SW Visit: Initial Assessment    Primary Care Provider verified and updated as needed: Yes   Readmission within the last 30 days: Yes.     Reason for Consult: discharge planning  Advance Care Planning:   ACP docs on file.         Communication Assessment  Patient's communication style: spoken language (English or Bilingual)    Hearing Difficulty or Deaf: no   Wear Glasses or Blind: yes    Cognitive  Cognitive/Neuro/Behavioral: .WDL except, all  Level of Consciousness: sedated  Arousal Level: opens eyes spontaneously  Orientation: other (see comments) (MARSHA)  Mood/Behavior: calm, cooperative, behavior appropriate to situation  Best Language:  (MARSHA)  Speech: endotracheal tube    Living Environment:   People in home: spouse  Amelia & Wolf  Current living Arrangements: house      Able to return to prior arrangements:         Family/Social Support:  Care provided by: self, spouse/significant other  Provides care for: no one  Marital Status:          Description of Support System: , Wolf, Sibling(s), nephew, Supportive, Involved    Support Assessment: Adequate family and caregiver support    Current Resources:   Patient receiving home care services: No     Community Resources:  outpatient physical therapy  Equipment currently used at home: none  Supplies currently used at home:  none    Employment/Financial:  Employment Status: disabled, retired        Financial Concerns: No concerns identified           Lifestyle & Psychosocial Needs:  Social Determinants of Health     Tobacco Use: Low Risk  (4/5/2023)    Patient History      Smoking Tobacco Use: Never      Smokeless Tobacco Use: Never      Passive Exposure: Not on file   Alcohol Use: Not on file   Financial Resource Strain: Not on file   Food Insecurity: Not on file   Transportation Needs: Not on file   Physical Activity: Not on  "file   Stress: Not on file   Social Connections: Not on file   Intimate Partner Violence: Not At Risk (9/14/2022)    Humiliation, Afraid, Rape, and Kick questionnaire      Fear of Current or Ex-Partner: No      Emotionally Abused: No      Physically Abused: No      Sexually Abused: No   Depression: Not at risk (3/17/2023)    PHQ-2      PHQ-2 Score: 0   Recent Concern: Depression - At risk (12/19/2022)    PHQ-2      PHQ-2 Score: 4   Housing Stability: Not on file       Functional Status:  Prior to admission patient needed assistance:   Dependent ADLs: Independent, occasional help from   Dependent IADLs: Independent, occasional help from        Mental Health Status:  Mental Health Status: No Current Concerns       Chemical Dependency Status:  Chemical Dependency Status: No Current Concerns             Values/Beliefs:  Spiritual, Cultural Beliefs, Voodoo Practices, Values that affect care: no            Additional Information:  Per H&P: \" Amelia Michel is a 62 year old adult with PMH of cardiac arrest (2017) NSVT s/p PVC ablation (11/22), HFpEF, mildly reduced RV function, afib/aflutter, CKD III, severe tricuspid regurgitation. Now POD#0 s/p mini thoracotomy and TVR (Porcine 29 mm valve) with Dr. Cope 4/10/23.\"    Writer met with patient's , introduced RNCC role, and completed initial assessment completed due to high risk readmission score. See details above. Care management will follow for any discharge needs.       Halie Fry, CARMELITAN  RN Care Coordinator  [address] [address]  [email or web address]  Gender pronouns: she/her        "

## 2023-04-11 NOTE — PLAN OF CARE
EXAMINATION TYPE: XR chest 1V

 

DATE OF EXAM: 8/31/2020

 

COMPARISON: NONE

 

HISTORY: Chest pain

 

TECHNIQUE: Single frontal view of the chest is obtained.

 

FINDINGS:  Technique is apical lordotic. Heart is enlarged. No evident pneumothorax or pleural effusi
on. There is motion on the exam. Eventration of the right hemidiaphragm is suspected. Central vascula
rity and jaya are within normal limits. There is normal bone mineralization. There are overlying card
iac leads.

 

IMPRESSION:  Cardiomegaly. Possible limitations, consider follow-up PA and lateral chest x-ray when p
atient is stable. Goal Outcome Evaluation:      Plan of Care Reviewed With: patient    Overall Patient Progress: improvingOverall Patient Progress: improving    Outcome Evaluation: Ratna weaned off this am. Extubated at 1328. Off pressors. Up in chair.    Major Shift Events: Ratna weaned off at 1115 this am. Extubated to 3L O2 NC. A&Ox4 post extubation. Up to chair with lift this afternoon for an hour. Dilaudid for pain (see MAR). PPM interrogated by device RN; now DDDR . Levo titrated off this am (see MAR). MAP>65 and SBP<140. Arterial line pulled per order. Received 20mg lasix x1, with urine output 5-20mL/hr, CVTS team aware.    Plan: Continue to work with therapies and encourage pulm hygiene.     For vital signs and complete assessments, please see documentation flowsheets.

## 2023-04-11 NOTE — PROGRESS NOTES
CV ICU PROGRESS NOTE  Amelia Michel  [id number]  Admitted: 4/10/2023  5:08 AM      ASSESSMENT  Amelia Michel is a 62 year old adult with PMH of cardiac arrest (2017) NSVT s/p PVC ablation (11/22), HFpEF, mildly reduced RV function, afib/aflutter, CKD III, severe tricuspid regurgitation. Now POD#1 s/p mini thoracotomy and TVR (Porcine 29 mm valve) with Dr. Cope 4/10/23.      PLAN:  Neuro/ pain/ sedation:  # Acute post-operative pain  - Fentanyl gtt for pain.  - Scheduled: Tylenol 975 Q8H, Gabapentin 100 mg TID  - PRN: Tylenol, Dilaudid, Oxycodone  - Regional anesthesia: Right sided COOPER catheter  # Sedation   - Propofol gtt for sedation. Plan to wean this afternoon in anticipation of extubation      Pulmonary:   # Acute post-operative respiratory failure  - MV 14 / 340 / 40 / 5  - Titrate FiO2 for SpO2 >92%  - Nitric at 5 PPM weaning today in anticipation of extubation   - Supplemental oxygen once extubated, wean with goal SpO2 > 92%  - Encourage IS and flutter valve Q15 once extubated      Cardiovascular:    # Hypertension   # Hx VSD s/p repair (1969)  # Hx of Vfib cardiac arrest (2017)  # Chronic diastolic heart failure NYHA Class III  # HFpEF 55-60%   # Mildly reduced RV function  # Severe tricuspid regurgitation s/p TVR   # Cardiogenic shock  - Epi, NE gtts for inotropic and pressor support, wean as able  - Nitric 5 PPM for RV support overnight; weaning today in anticipation of extubation   - Goal MAP >65, SBP <140, monitor hemodynamics  - Hold PTA spironolactone, torsemide, metolazone  - ASA: 82 mg POD#1     # Hx of Vfib cardiac arrest (2017)  # SSS s/p PPM (2019)  # Frequent, NSVT s/p VT ablation (11/30/2022)  # Hx of afib/aflutter   Dual chamber PPM (2019), Inherent Pacing mode DDDR  - Plan device interrogation   [id number] 2 on PTA abixaban  - Hold PTA diltiazem, apixaban  - Currently V paced 75 BPM  - Plan to assess inherent rhythm post extubation if hemodynamically stable      GI/Nutrition:   -  NPO bedside swallow eval once extubated  - OGT  - PPI prophylaxis     Fluids/ Electrolytes/Renal:   # Chronic kidney disease, III  # Chronic hyponatremia   Baseline Cr appears to be ~1.0-1.2. Baseline Na 127-132  - Strict I/O, daily weights, avoid/limit nephrotoxins  - Replete lytes PRN per protocol     Endocrine:    # Stress hyperglycemia  Pre op HgA1c 5.9  - Off insulin gtt POD#1  - MDSSI  - Goal BG <180 for optimal healing      ID/ Antibiotics:  - Complete perioperative antibiotics  - Monitor fever curve, WBC, and inflammatory markers as appropriate     Heme:   # Hx DLBCL s/p CHOP in remission     # Acute blood loss anemia  #Post-CPB  thrombocytopenia  *Hx hives after plt transfusion. Plan pre-treat diphenhydramine if needs transfusion  No s/sx active bleeding  Postoperative thrombocytopenia stable >70  - Daily CBC   - Hgb goal > 7.0     MSK / Skin  #Rheumatoid arthritis  #Sternotomy  #Surgical Incision  Sternal precautions  - Postoperative incision management per protocol  - PT/OT/CR  - RA on PTA Plaquenil and prednisone  - Plan restart PTA prednisone 3 mg daily     Prophylaxis:    - Mechanical DVT ppx  - Chemical DVT ppx: SQH  - PPI     Lines/ tubes/ drains:  - RIJ CVC  - R brachial Arterial line  - Huynh  - OG  - CT x3 (1 R pleural, 2 mediastinal)  - Epicardial V pacing wire x1  - 7.0 ETT      Disposition:  - CV ICU.      Patient seen, findings and plan discussed with CVICU staff Dr. Adam.     Jessenia Ravi MD   PGY2, Dept. of Anesthesiology  [facility] [id number]    ====================================    TODAY'S PROGRESS  SUBJECTIVE  - NAEO. Nursing notes reviewed.  -  NO at 5 PPM overnight. Able to wean pressors easily while sedation weaned. Required a little epi gtt and LR bolus to support soft pressures overnight. Nodding head, moving all extremities with light sedation. Pain well controlled at that time. V paced 75 BPM. Family at bedside this morning.       OBJECTIVE  1. VITAL SIGNS  Temp:  [97.2  F (36.2   C)-99.5  F (37.5  C)] 98.2  F (36.8  C)  Pulse:  [75] 75  Resp:  [10-46] 17  MAP:  [58 mmHg-86 mmHg] 70 mmHg  Arterial Line BP: ()/(46-66) 96/53  FiO2 (%):  [40 %] 40 %  SpO2:  [99 %-100 %] 100 %  Vent Mode: CMV/AC  (Continuous Mandatory Ventilation/ Assist Control)  FiO2 (%): 40 %  Resp Rate (Set): 14 breaths/min  Tidal Volume (Set, mL): 340 mL  PEEP (cm H2O): 5 cmH2O  Resp: 17      2. INTAKE/ OUTPUT  I/O last 3 completed shifts:  In: 4310.45 [I.V.:2140.45; Other:550; NG/GT:120; IV Piggyback:1500]  Out: 2265 [Urine:965; Blood:1000; Chest Tube:300]    3. PHYSICAL EXAMINATION  General: awakens to voice, stimulation. Comfortable appearing  HEENT: normocephalic, atraumatic, membranes moist, ETT secured.  CV: normal capillary refill, V-paced 75  Respiratory: mechanically ventilated, lung sounds clear bilaterally  Abdominal: soft, non-distended  Genitourinary: no suprapubic distension, brand clear urine  Musculoskeletal: normal bulk and tone, petite  Skin: sternotomy c/d/i  Neurologic: moving all extremities spontaneously on demand  Psychiatric: appropriate      4. INVESTIGATIONS  Arterial Blood Gases   Recent Labs   Lab 04/11/23  0350 04/10/23  2012 04/10/23  1519 04/10/23  1328   PH 7.46* 7.47* 7.46* 7.51*   PCO2 37 35 35 34*   PO2 183* 154* 181* 175*   HCO3 26 26 25 27     Complete Blood Count   Recent Labs   Lab 04/11/23  0350 04/10/23  1327 04/10/23  1210 04/10/23  1207 04/10/23  1137 04/10/23  0839 04/10/23  0731   WBC 8.9 11.0  --   --   --   --  5.1   HGB 11.6* 12.1  --  11.1* 11.1*   < > 12.4   PLT 73* 76* 86*  --   --   --  109*    < > = values in this interval not displayed.     Basic Metabolic Panel  Recent Labs   Lab 04/11/23  0752 04/11/23  0350 04/10/23  2347 04/10/23  1814 04/10/23  1518 04/10/23  1329 04/10/23  1327 04/10/23  1207 04/10/23  1137 04/10/23  0839 04/10/23  0731   NA  --  136  --   --   --   --  137 134 134   < > 131*   POTASSIUM  --  3.9 4.2  --   --   --  3.4 3.5 3.5   < > 3.5    CHLORIDE  --  101  --   --   --   --  99  --   --   --  93*   CO2  --  20*  --   --   --   --  21*  --   --   --  25   BUN  --  31.2*  --   --   --   --  24.7*  --   --   --  27.9*   CR  --  1.11  --   --   --   --  1.13  --   --   --  1.07   * 145*  --  130* 107*   < > 82 155* 185*   < > 109*    < > = values in this interval not displayed.     Liver Function Tests  Recent Labs   Lab 04/11/23  0350 04/10/23  1327 04/10/23  1210 04/10/23  0604   * 105*  --   --    ALT 53* 42  --   --    ALKPHOS 73 83  --   --    BILITOTAL 1.5* 1.9*  --   --    ALBUMIN 3.2* 3.4*  --   --    INR  --  1.71* 1.76* 1.20*     Pancreatic Enzymes  No lab results found in last 7 days.  Coagulation Profile  Recent Labs   Lab 04/10/23  1327 04/10/23  1210 04/10/23  0604   INR 1.71* 1.76* 1.20*   PTT 61* 35 29     Lactate  Invalid input(s): LACTATE    5. RADIOLOGY  Recent Results (from the past 24 hour(s))   XR Chest Port 1 View    Narrative    Examination: XR CHEST PORT 1 VIEW 4/10/2023 2:50 PM    Indication: Post Op CVTS Surgery    Comparison: X-ray 3/17/2023    Findings:  AP supine portable chest. Postoperative changes of tricuspid valve  repair. Median sternotomy wires, and mild perihilar haziness with mild  basilar atelectasis. Stable cardiomegaly. Right basilar chest tube. No  appreciable pneumothorax. Mediastinal surgical drains. Enteric tube  terminates over the stomach. No significant pleural effusions.  Endotracheal tube projects over the midthoracic trachea. Left  subclavian transvenous approach pacer device with leads and overlying  soft tissue position. No focal consolidation. Unremarkable limited  upper abdomen radiographically. Stable mild convex leftward  thoracolumbar curve. Degenerative spurring throughout the visualized  vertebral column.      Impression    Impression:   Stable cardiomegaly with postsurgical changes tricuspid valve repair.  Mild postsurgical perihilar edema and scattered atelectasis.  Continued  follow-up to exclude infectious component.    I have personally reviewed the examination and initial interpretation  and I agree with the findings.    JUVENAL GALDAMEZ MD         SYSTEM ID:  [id number]   XR Chest Port 1 View    Narrative    EXAM: XR CHEST PORT 1 VIEW  4/11/2023 1:00 AM     HISTORY:  chest tubes s/p tricuspid valve replacement       COMPARISON:  Chest x-ray 4/10/2023    FINDINGS:   AP view of the chest. Postsurgical changes of tricuspid valve  replacement. Intact sternotomy wires. Endotracheal tube tip is  approximately 4.2 cm above the ermelinda. Enteric tube sidehole overlies  the stomach; the tip is out of the field-of-view. Left chest wall  pacemaker with leads in stable position. Right IJ central venous  catheter tip overlies the high SVC. Stable right chest tubes and  mediastinal drains.    Stable enlarged heart size. Stable perihilar opacities. No significant  pleural effusion. No pneumothorax. Subcutaneous emphysema in the right  supraclavicular region.      Impression    IMPRESSION:   1. Stable mild perihilar atelectasis/edema.  2. Stable support devices.    I have personally reviewed the examination and initial interpretation  and I agree with the findings.    RAMONA COELLO MD         SYSTEM ID:  [id number]

## 2023-04-12 ENCOUNTER — APPOINTMENT (OUTPATIENT)
Dept: GENERAL RADIOLOGY | Facility: CLINIC | Age: 63
DRG: 220 | End: 2023-04-12
Attending: THORACIC SURGERY (CARDIOTHORACIC VASCULAR SURGERY)
Payer: COMMERCIAL

## 2023-04-12 ENCOUNTER — APPOINTMENT (OUTPATIENT)
Dept: OCCUPATIONAL THERAPY | Facility: CLINIC | Age: 63
DRG: 220 | End: 2023-04-12
Attending: THORACIC SURGERY (CARDIOTHORACIC VASCULAR SURGERY)
Payer: COMMERCIAL

## 2023-04-12 ENCOUNTER — APPOINTMENT (OUTPATIENT)
Dept: PHYSICAL THERAPY | Facility: CLINIC | Age: 63
DRG: 220 | End: 2023-04-12
Attending: THORACIC SURGERY (CARDIOTHORACIC VASCULAR SURGERY)
Payer: COMMERCIAL

## 2023-04-12 LAB
ALBUMIN SERPL BCG-MCNC: 3.9 G/DL (ref 3.5–5.2)
ALP SERPL-CCNC: 85 U/L (ref 35–129)
ALT SERPL W P-5'-P-CCNC: 53 U/L (ref 10–50)
ANION GAP SERPL CALCULATED.3IONS-SCNC: 13 MMOL/L (ref 7–15)
AST SERPL W P-5'-P-CCNC: 89 U/L (ref 10–50)
ATRIAL RATE - MUSE: 129 BPM
ATRIAL RATE - MUSE: NORMAL BPM
BILIRUB SERPL-MCNC: 1.3 MG/DL
BUN SERPL-MCNC: 48.1 MG/DL (ref 8–23)
CA-I BLD-MCNC: 4.9 MG/DL (ref 4.4–5.2)
CALCIUM SERPL-MCNC: 9.5 MG/DL (ref 8.8–10.2)
CHLORIDE SERPL-SCNC: 97 MMOL/L (ref 98–107)
CREAT SERPL-MCNC: 1.45 MG/DL (ref 0.51–1.17)
DEPRECATED HCO3 PLAS-SCNC: 24 MMOL/L (ref 22–29)
DIASTOLIC BLOOD PRESSURE - MUSE: NORMAL MMHG
DIASTOLIC BLOOD PRESSURE - MUSE: NORMAL MMHG
ERYTHROCYTE [DISTWIDTH] IN BLOOD BY AUTOMATED COUNT: 14.4 % (ref 10–15)
GFR SERPL CREATININE-BSD FRML MDRD: 41 ML/MIN/1.73M2
GLUCOSE BLDC GLUCOMTR-MCNC: 101 MG/DL (ref 70–99)
GLUCOSE BLDC GLUCOMTR-MCNC: 124 MG/DL (ref 70–99)
GLUCOSE BLDC GLUCOMTR-MCNC: 88 MG/DL (ref 70–99)
GLUCOSE BLDC GLUCOMTR-MCNC: 96 MG/DL (ref 70–99)
GLUCOSE BLDC GLUCOMTR-MCNC: 97 MG/DL (ref 70–99)
GLUCOSE BLDC GLUCOMTR-MCNC: 97 MG/DL (ref 70–99)
GLUCOSE SERPL-MCNC: 99 MG/DL (ref 70–99)
HCT VFR BLD AUTO: 33.5 % (ref 35–53)
HGB BLD-MCNC: 11.2 G/DL (ref 11.7–17.7)
INTERPRETATION ECG - MUSE: NORMAL
INTERPRETATION ECG - MUSE: NORMAL
MAGNESIUM SERPL-MCNC: 2.1 MG/DL (ref 1.7–2.3)
MAGNESIUM SERPL-MCNC: 2.4 MG/DL (ref 1.7–2.3)
MCH RBC QN AUTO: 36.2 PG (ref 26.5–33)
MCHC RBC AUTO-ENTMCNC: 33.4 G/DL (ref 31.5–36.5)
MCV RBC AUTO: 108 FL (ref 78–100)
P AXIS - MUSE: NORMAL DEGREES
P AXIS - MUSE: NORMAL DEGREES
PHOSPHATE SERPL-MCNC: 4.9 MG/DL (ref 2.5–4.5)
PLATELET # BLD AUTO: 76 10E3/UL (ref 150–450)
POTASSIUM SERPL-SCNC: 2.7 MMOL/L (ref 3.4–5.3)
POTASSIUM SERPL-SCNC: 3.1 MMOL/L (ref 3.4–5.3)
POTASSIUM SERPL-SCNC: 3.1 MMOL/L (ref 3.4–5.3)
POTASSIUM SERPL-SCNC: 3.9 MMOL/L (ref 3.4–5.3)
PR INTERVAL - MUSE: NORMAL MS
PR INTERVAL - MUSE: NORMAL MS
PROT SERPL-MCNC: 6.4 G/DL (ref 6.4–8.3)
QRS DURATION - MUSE: 154 MS
QRS DURATION - MUSE: 92 MS
QT - MUSE: 336 MS
QT - MUSE: 384 MS
QTC - MUSE: 482 MS
QTC - MUSE: 565 MS
R AXIS - MUSE: 202 DEGREES
R AXIS - MUSE: 97 DEGREES
RBC # BLD AUTO: 3.09 10E6/UL (ref 3.8–5.9)
SODIUM SERPL-SCNC: 134 MMOL/L (ref 136–145)
SYSTOLIC BLOOD PRESSURE - MUSE: NORMAL MMHG
SYSTOLIC BLOOD PRESSURE - MUSE: NORMAL MMHG
T AXIS - MUSE: -5 DEGREES
T AXIS - MUSE: -61 DEGREES
VENTRICULAR RATE- MUSE: 124 BPM
VENTRICULAR RATE- MUSE: 130 BPM
WBC # BLD AUTO: 11.6 10E3/UL (ref 4–11)

## 2023-04-12 PROCEDURE — 250N000012 HC RX MED GY IP 250 OP 636 PS 637: Performed by: THORACIC SURGERY (CARDIOTHORACIC VASCULAR SURGERY)

## 2023-04-12 PROCEDURE — 93010 ELECTROCARDIOGRAM REPORT: CPT | Performed by: INTERNAL MEDICINE

## 2023-04-12 PROCEDURE — 250N000013 HC RX MED GY IP 250 OP 250 PS 637

## 2023-04-12 PROCEDURE — 250N000013 HC RX MED GY IP 250 OP 250 PS 637: Performed by: PHYSICIAN ASSISTANT

## 2023-04-12 PROCEDURE — 97530 THERAPEUTIC ACTIVITIES: CPT | Mod: GP

## 2023-04-12 PROCEDURE — 250N000011 HC RX IP 250 OP 636

## 2023-04-12 PROCEDURE — 83735 ASSAY OF MAGNESIUM: CPT | Performed by: THORACIC SURGERY (CARDIOTHORACIC VASCULAR SURGERY)

## 2023-04-12 PROCEDURE — 80053 COMPREHEN METABOLIC PANEL: CPT | Performed by: STUDENT IN AN ORGANIZED HEALTH CARE EDUCATION/TRAINING PROGRAM

## 2023-04-12 PROCEDURE — 82330 ASSAY OF CALCIUM: CPT | Performed by: PHYSICIAN ASSISTANT

## 2023-04-12 PROCEDURE — 97162 PT EVAL MOD COMPLEX 30 MIN: CPT | Mod: GP

## 2023-04-12 PROCEDURE — 250N000011 HC RX IP 250 OP 636: Performed by: PHYSICIAN ASSISTANT

## 2023-04-12 PROCEDURE — 97530 THERAPEUTIC ACTIVITIES: CPT | Mod: GO

## 2023-04-12 PROCEDURE — 83735 ASSAY OF MAGNESIUM: CPT | Performed by: STUDENT IN AN ORGANIZED HEALTH CARE EDUCATION/TRAINING PROGRAM

## 2023-04-12 PROCEDURE — 84100 ASSAY OF PHOSPHORUS: CPT | Performed by: STUDENT IN AN ORGANIZED HEALTH CARE EDUCATION/TRAINING PROGRAM

## 2023-04-12 PROCEDURE — 271N000002 HC RX 271

## 2023-04-12 PROCEDURE — 250N000013 HC RX MED GY IP 250 OP 250 PS 637: Performed by: STUDENT IN AN ORGANIZED HEALTH CARE EDUCATION/TRAINING PROGRAM

## 2023-04-12 PROCEDURE — 250N000009 HC RX 250: Performed by: STUDENT IN AN ORGANIZED HEALTH CARE EDUCATION/TRAINING PROGRAM

## 2023-04-12 PROCEDURE — 99233 SBSQ HOSP IP/OBS HIGH 50: CPT | Mod: 24 | Performed by: STUDENT IN AN ORGANIZED HEALTH CARE EDUCATION/TRAINING PROGRAM

## 2023-04-12 PROCEDURE — 250N000011 HC RX IP 250 OP 636: Performed by: THORACIC SURGERY (CARDIOTHORACIC VASCULAR SURGERY)

## 2023-04-12 PROCEDURE — 71045 X-RAY EXAM CHEST 1 VIEW: CPT

## 2023-04-12 PROCEDURE — 84132 ASSAY OF SERUM POTASSIUM: CPT | Performed by: THORACIC SURGERY (CARDIOTHORACIC VASCULAR SURGERY)

## 2023-04-12 PROCEDURE — 85027 COMPLETE CBC AUTOMATED: CPT | Performed by: STUDENT IN AN ORGANIZED HEALTH CARE EDUCATION/TRAINING PROGRAM

## 2023-04-12 PROCEDURE — 93005 ELECTROCARDIOGRAM TRACING: CPT

## 2023-04-12 PROCEDURE — 84132 ASSAY OF SERUM POTASSIUM: CPT | Performed by: PHYSICIAN ASSISTANT

## 2023-04-12 PROCEDURE — 258N000003 HC RX IP 258 OP 636

## 2023-04-12 PROCEDURE — 200N000002 HC R&B ICU UMMC

## 2023-04-12 PROCEDURE — 97165 OT EVAL LOW COMPLEX 30 MIN: CPT | Mod: GO

## 2023-04-12 PROCEDURE — 250N000013 HC RX MED GY IP 250 OP 250 PS 637: Performed by: INTERNAL MEDICINE

## 2023-04-12 PROCEDURE — 3E0T3BZ INTRODUCTION OF ANESTHETIC AGENT INTO PERIPHERAL NERVES AND PLEXI, PERCUTANEOUS APPROACH: ICD-10-PCS | Performed by: NURSE PRACTITIONER

## 2023-04-12 PROCEDURE — 71045 X-RAY EXAM CHEST 1 VIEW: CPT | Mod: 26 | Performed by: RADIOLOGY

## 2023-04-12 PROCEDURE — 250N000011 HC RX IP 250 OP 636: Performed by: NURSE PRACTITIONER

## 2023-04-12 PROCEDURE — 99232 SBSQ HOSP IP/OBS MODERATE 35: CPT | Performed by: NURSE PRACTITIONER

## 2023-04-12 RX ORDER — METOLAZONE 2.5 MG/1
2.5 TABLET ORAL DAILY
Status: DISCONTINUED | OUTPATIENT
Start: 2023-04-12 | End: 2023-04-16

## 2023-04-12 RX ORDER — NOREPINEPHRINE BITARTRATE 0.06 MG/ML
.01-.6 INJECTION, SOLUTION INTRAVENOUS CONTINUOUS PRN
Status: DISCONTINUED | OUTPATIENT
Start: 2023-04-12 | End: 2023-04-13

## 2023-04-12 RX ORDER — POTASSIUM CHLORIDE 750 MG/1
20 TABLET, EXTENDED RELEASE ORAL ONCE
Status: COMPLETED | OUTPATIENT
Start: 2023-04-12 | End: 2023-04-12

## 2023-04-12 RX ORDER — FUROSEMIDE 10 MG/ML
20 INJECTION INTRAMUSCULAR; INTRAVENOUS ONCE
Status: DISCONTINUED | OUTPATIENT
Start: 2023-04-12 | End: 2023-04-12

## 2023-04-12 RX ORDER — TORSEMIDE 20 MG/1
20 TABLET ORAL
Status: DISCONTINUED | OUTPATIENT
Start: 2023-04-12 | End: 2023-04-16

## 2023-04-12 RX ORDER — POTASSIUM CHLORIDE 7.45 MG/ML
10 INJECTION INTRAVENOUS
Status: DISCONTINUED | OUTPATIENT
Start: 2023-04-12 | End: 2023-04-12

## 2023-04-12 RX ORDER — BUPIVACAINE HYDROCHLORIDE 2.5 MG/ML
5 INJECTION, SOLUTION EPIDURAL; INFILTRATION; INTRACAUDAL ONCE
Status: COMPLETED | OUTPATIENT
Start: 2023-04-12 | End: 2023-04-12

## 2023-04-12 RX ORDER — TORSEMIDE 20 MG/1
20 TABLET ORAL
Status: DISCONTINUED | OUTPATIENT
Start: 2023-04-12 | End: 2023-04-12

## 2023-04-12 RX ORDER — POTASSIUM CHLORIDE 29.8 MG/ML
20 INJECTION INTRAVENOUS ONCE
Status: COMPLETED | OUTPATIENT
Start: 2023-04-12 | End: 2023-04-12

## 2023-04-12 RX ORDER — NICOTINE POLACRILEX 4 MG
15-30 LOZENGE BUCCAL
Status: DISCONTINUED | OUTPATIENT
Start: 2023-04-12 | End: 2023-04-12

## 2023-04-12 RX ORDER — HYDROXYCHLOROQUINE SULFATE 200 MG/1
200 TABLET, FILM COATED ORAL EVERY EVENING
Status: DISCONTINUED | OUTPATIENT
Start: 2023-04-12 | End: 2023-04-17 | Stop reason: HOSPADM

## 2023-04-12 RX ORDER — DEXTROSE MONOHYDRATE 25 G/50ML
25-50 INJECTION, SOLUTION INTRAVENOUS
Status: DISCONTINUED | OUTPATIENT
Start: 2023-04-12 | End: 2023-04-12

## 2023-04-12 RX ORDER — FUROSEMIDE 10 MG/ML
40 INJECTION INTRAMUSCULAR; INTRAVENOUS ONCE
Status: DISCONTINUED | OUTPATIENT
Start: 2023-04-12 | End: 2023-04-12

## 2023-04-12 RX ADMIN — HYDROMORPHONE HYDROCHLORIDE 0.2 MG: 0.2 INJECTION, SOLUTION INTRAMUSCULAR; INTRAVENOUS; SUBCUTANEOUS at 02:32

## 2023-04-12 RX ADMIN — Medication 10 MG: at 21:03

## 2023-04-12 RX ADMIN — BUPIVACAINE HYDROCHLORIDE 12.5 MG: 2.5 INJECTION, SOLUTION EPIDURAL; INFILTRATION; INTRACAUDAL; PERINEURAL at 09:34

## 2023-04-12 RX ADMIN — SENNOSIDES AND DOCUSATE SODIUM 1 TABLET: 50; 8.6 TABLET ORAL at 07:39

## 2023-04-12 RX ADMIN — POLYETHYLENE GLYCOL 3350 17 G: 17 POWDER, FOR SOLUTION ORAL at 08:01

## 2023-04-12 RX ADMIN — Medication 3 MG: at 07:40

## 2023-04-12 RX ADMIN — Medication 0.03 MCG/KG/MIN: at 11:26

## 2023-04-12 RX ADMIN — Medication 500 MG: at 22:25

## 2023-04-12 RX ADMIN — TORSEMIDE 20 MG: 20 TABLET ORAL at 17:26

## 2023-04-12 RX ADMIN — SENNOSIDES AND DOCUSATE SODIUM 1 TABLET: 50; 8.6 TABLET ORAL at 20:31

## 2023-04-12 RX ADMIN — PANTOPRAZOLE SODIUM 40 MG: 40 TABLET, DELAYED RELEASE ORAL at 07:39

## 2023-04-12 RX ADMIN — HYDROMORPHONE HYDROCHLORIDE 0.2 MG: 0.2 INJECTION, SOLUTION INTRAMUSCULAR; INTRAVENOUS; SUBCUTANEOUS at 08:05

## 2023-04-12 RX ADMIN — ACETAMINOPHEN 975 MG: 325 TABLET ORAL at 13:16

## 2023-04-12 RX ADMIN — HYDROMORPHONE HYDROCHLORIDE 0.2 MG: 0.2 INJECTION, SOLUTION INTRAMUSCULAR; INTRAVENOUS; SUBCUTANEOUS at 21:03

## 2023-04-12 RX ADMIN — METOLAZONE 2.5 MG: 2.5 TABLET ORAL at 11:37

## 2023-04-12 RX ADMIN — HEPARIN SODIUM 5000 UNITS: 5000 INJECTION, SOLUTION INTRAVENOUS; SUBCUTANEOUS at 20:31

## 2023-04-12 RX ADMIN — ACETAMINOPHEN 975 MG: 325 TABLET ORAL at 05:54

## 2023-04-12 RX ADMIN — POTASSIUM CHLORIDE 20 MEQ: 750 TABLET, EXTENDED RELEASE ORAL at 22:24

## 2023-04-12 RX ADMIN — MUPIROCIN 0.5 G: 20 OINTMENT TOPICAL at 07:40

## 2023-04-12 RX ADMIN — HEPARIN SODIUM 5000 UNITS: 5000 INJECTION, SOLUTION INTRAVENOUS; SUBCUTANEOUS at 03:59

## 2023-04-12 RX ADMIN — TORSEMIDE 20 MG: 20 TABLET ORAL at 11:20

## 2023-04-12 RX ADMIN — HEPARIN SODIUM 5000 UNITS: 5000 INJECTION, SOLUTION INTRAVENOUS; SUBCUTANEOUS at 11:20

## 2023-04-12 RX ADMIN — POTASSIUM CHLORIDE 20 MEQ: 29.8 INJECTION, SOLUTION INTRAVENOUS at 16:22

## 2023-04-12 RX ADMIN — ASPIRIN 81 MG 81 MG: 81 TABLET ORAL at 07:39

## 2023-04-12 RX ADMIN — ACETAMINOPHEN 975 MG: 325 TABLET ORAL at 20:30

## 2023-04-12 RX ADMIN — HYDROXYCHLOROQUINE SULFATE 200 MG: 200 TABLET, FILM COATED ORAL at 20:31

## 2023-04-12 RX ADMIN — SODIUM CHLORIDE, POTASSIUM CHLORIDE, SODIUM LACTATE AND CALCIUM CHLORIDE 250 ML: 600; 310; 30; 20 INJECTION, SOLUTION INTRAVENOUS at 10:03

## 2023-04-12 ASSESSMENT — ACTIVITIES OF DAILY LIVING (ADL)
ADLS_ACUITY_SCORE: 28

## 2023-04-12 NOTE — PLAN OF CARE
Major Shift Events:  pt went into Afib/flutter which then her pacemake started to compete with her rhythm causing increased HR and SOB. 25 mg atenolol given PO, rhythm returned to sinus but remained tachycardic. Remained on 2L nc throughout shift. U/O marginal, service aware.     Plan: Transfer to next level of care.    For vital signs and complete assessments, please see documentation flowsheets.

## 2023-04-12 NOTE — PROGRESS NOTES
CV ICU PROGRESS NOTE  Amelia Michel  [id number]  Admitted: 4/10/2023  5:08 AM      ASSESSMENT  Amelia Michel is a 62 year old adult with PMH of cardiac arrest (2017) NSVT s/p PVC ablation (11/22), HFpEF, mildly reduced RV function, afib/aflutter, CKD III, severe tricuspid regurgitation. Now POD#2 s/p mini thoracotomy and TVR (Porcine 29 mm valve) with Dr. Cope 4/10/23.      Updates/Changes Today:  - Overnight aflutter/afib with rates 140-150s. 25 mg atenolol with conversion.   - Remains dual paced this am with rates 120-130s. MAPs soft but remains off pressors. UOP low   - Restart PTA diuretics: metolazone 2.5 mg once and torsemide 20 mg BID  - Trial diltiazem gtt with goal to achieve rate control; if successful may transition to PTA dose  - Ok restart norepi gtt to support MAPs in effort to remain euvolemic     PLAN:  Neuro/ pain/ sedation:  # Acute post-operative pain  - Scheduled: Tylenol 975 Q8H, Gabapentin 100 mg TID  - PRN: Tylenol, Dilaudid, Oxycodone  - Regional anesthesia: Right sided COOPER catheter     Pulmonary:   # Acute post-operative respiratory insufficiency  - Supplemental oxygen, wean with goal SpO2 > 92%  - Encourage IS and flutter valve Q10-15 minutes once extubated      Cardiovascular:    # Hypertension   # Hx VSD s/p repair (1969)  # Hx of Vfib cardiac arrest (2017)  # Chronic diastolic heart failure NYHA Class III  # HFpEF 55-60%   # Mildly reduced RV function  # Severe tricuspid regurgitation s/p TVR   # Cardiogenic shock  - Goal MAP >65, SBP <140, monitor hemodynamics  - Diuresis: restart PTA metolazone, torsemide with goal net negative 500-1000  - Hold PTA spironolactone, empagliflozin  - ASA: 82 mg      # Hx of Vfib cardiac arrest (2017)  # SSS s/p PPM (2019)  # Frequent, NSVT s/p VT ablation (11/30/2022)  # Hx of afib/aflutter   Dual chamber PPM (2019), Inherent Pacing mode DDDR  - Device check 4/11 VVIR changed to DDDR  BPM  [id number] 2 on PTA abixaban  - Hold PTA  apixaban     GI/Nutrition:   - Regular diet  - PPI prophylaxis     Fluids/ Electrolytes/Renal:   # Chronic kidney disease, III  # Chronic hyponatremia   Baseline Cr appears to be ~1.0-1.2. Baseline Na 127-132  - Strict I/O, daily weights, avoid/limit nephrotoxins  - Replete lytes PRN per protocol     Endocrine:    # Stress hyperglycemia  Pre op HgA1c 5.9  - MDSSI  - Goal BG <180 for optimal healing      ID/ Antibiotics:  - Monitor fever curve, WBC, and inflammatory markers as appropriate     Heme:   # Hx DLBCL s/p CHOP in remission     # Acute blood loss anemia  #Post-CPB  thrombocytopenia  *Hx hives after plt transfusion. Plan pre-treat diphenhydramine if needs transfusion  No s/sx active bleeding  Postoperative thrombocytopenia stable >70  - Daily CBC   - Hgb goal > 7.0     MSK / Skin  #Rheumatoid arthritis  #Sternotomy  #Surgical Incision  Sternal precautions  - Postoperative incision management per protocol  - PT/OT/CR  - RA on PTA Plaquenil and prednisone  - Continue PTA prednisone 3 mg daily  - Restart PTA Plaquenil 200 mg at bedtime      Prophylaxis:    - Mechanical DVT ppx  - Chemical DVT ppx: SQH  - PPI     Lines/ tubes/ drains:  - RIJ CVC  - Huynh - remove   - CT x3 (1 R pleural, 2 mediastinal)  - Epicardial V pacing wire x1     Disposition:  - CV ICU.      Patient seen, findings and plan discussed with CVICU staff Dr. Adam.     Jessenia Ravi MD   PGY2, Dept. of Anesthesiology  [facility] [id number]    ====================================    TODAY'S PROGRESS  SUBJECTIVE  - Nursing notes reviewed.  - Patient's dual chamber PPM was returned to inherent pacing mode DDDR 4/11. Overnight, patient with irregular afib/aflutter rhythm with rate 150-160s. Received 25 atenolol and appeared to convert to sinus rhythm with rate 120s. Remains dual paced at >100 BPM. MAPs soft but remains off all pressors. Pain remains well controlled. Tolerating regular diet without nausea. No bowel movement. UOP remains low, <50 ml/hr. Family  at bedside this morning.       OBJECTIVE  1. VITAL SIGNS  Temp:  [98.1  F (36.7  C)-99.9  F (37.7  C)] 98.4  F (36.9  C)  Pulse:  [] 128  Resp:  [8-31] 14  BP: ()/(60-81) 87/63  MAP:  [65 mmHg-107 mmHg] 89 mmHg  Arterial Line BP: ()/(51-83) 130/66  FiO2 (%):  [40 %] 40 %  SpO2:  [93 %-100 %] 100 %  Vent Mode: (S) Other (see comments) (pt extubated)  FiO2 (%): 40 %  Resp Rate (Set): 14 breaths/min  Tidal Volume (Set, mL): 340 mL  PEEP (cm H2O): 5 cmH2O  Pressure Support (cm H2O): 5 cmH2O  Resp: 14      2. INTAKE/ OUTPUT  I/O last 3 completed shifts:  In: 1370.15 [P.O.:640; I.V.:520.15; NG/GT:210]  Out: 1039 [Urine:629; Chest Tube:410]    3. PHYSICAL EXAMINATION  General: comfortable appearing, sitting up in bed. No acute distress  HEENT: anicteric sclera, membranes moist  CV: normal capillary refill, dual paced irregular rate 130 BPM  Respiratory: normal work of breathing, on 1 LPM nasal cannula. lung sounds clear bilaterally  Abdominal: soft, non-distended  Genitourinary: brand with small amount dark colored urine  Musculoskeletal: normal bulk and tone, petite  Skin: healed previous sternotomy. incision site c/d/i  Neurologic: alert and oriented, normal speech, moving all extremities spontaneously on demand  Psychiatric: appropriate      4. INVESTIGATIONS  Arterial Blood Gases   Recent Labs   Lab 04/11/23  1500 04/11/23  1304 04/11/23  1133 04/11/23  0350   PH 7.43 7.55* 7.47* 7.46*   PCO2 37 28* 36 37   PO2 106* 169* 150* 183*   HCO3 25 24 26 26     Complete Blood Count   Recent Labs   Lab 04/12/23  0355 04/11/23  0350 04/10/23  1327 04/10/23  1210 04/10/23  1207 04/10/23  0839 04/10/23  0731   WBC 11.6* 8.9 11.0  --   --   --  5.1   HGB 11.2* 11.6* 12.1  --  11.1*   < > 12.4   PLT 76* 73* 76* 86*  --   --  109*    < > = values in this interval not displayed.     Basic Metabolic Panel  Recent Labs   Lab 04/12/23  0358 04/12/23  0355 04/11/23  2357 04/11/23  2057 04/11/23 2010 04/11/23  1503  04/11/23  1307 04/11/23  0752 04/11/23  0350 04/10/23  1329 04/10/23  1327 04/10/23  1207 04/10/23  0839 04/10/23  0731   NA  --  134*  --   --   --   --   --   --  136  --  137 134   < > 131*   POTASSIUM  --  3.9  --  4.2  --   --  4.4  --  3.9   < > 3.4 3.5   < > 3.5   CHLORIDE  --  97*  --   --   --   --   --   --  101  --  99  --   --  93*   CO2  --  24  --   --   --   --   --   --  20*  --  21*  --   --  25   BUN  --  48.1*  --   --   --   --   --   --  31.2*  --  24.7*  --   --  27.9*   CR  --  1.45*  --   --   --   --   --   --  1.11  --  1.13  --   --  1.07   GLC 96 99 124*  --  150*   < >  --    < > 145*   < > 82 155*   < > 109*    < > = values in this interval not displayed.     Liver Function Tests  Recent Labs   Lab 04/12/23  0355 04/11/23  0350 04/10/23  1327 04/10/23  1210 04/10/23  0604   AST 89* 113* 105*  --   --    ALT 53* 53* 42  --   --    ALKPHOS 85 73 83  --   --    BILITOTAL 1.3* 1.5* 1.9*  --   --    ALBUMIN 3.9 3.2* 3.4*  --   --    INR  --   --  1.71* 1.76* 1.20*     Pancreatic Enzymes  No lab results found in last 7 days.  Coagulation Profile  Recent Labs   Lab 04/10/23  1327 04/10/23  1210 04/10/23  0604   INR 1.71* 1.76* 1.20*   PTT 61* 35 29     Lactate  Invalid input(s): LACTATE    5. RADIOLOGY  Recent Results (from the past 24 hour(s))   XR Chest Port 1 View    Narrative    Examination: XR CHEST PORT 1 VIEW 4/10/2023 2:50 PM    Indication: Post Op CVTS Surgery    Comparison: X-ray 3/17/2023    Findings:  AP supine portable chest. Postoperative changes of tricuspid valve  repair. Median sternotomy wires, and mild perihilar haziness with mild  basilar atelectasis. Stable cardiomegaly. Right basilar chest tube. No  appreciable pneumothorax. Mediastinal surgical drains. Enteric tube  terminates over the stomach. No significant pleural effusions.  Endotracheal tube projects over the midthoracic trachea. Left  subclavian transvenous approach pacer device with leads and overlying  soft  tissue position. No focal consolidation. Unremarkable limited  upper abdomen radiographically. Stable mild convex leftward  thoracolumbar curve. Degenerative spurring throughout the visualized  vertebral column.      Impression    Impression:   Stable cardiomegaly with postsurgical changes tricuspid valve repair.  Mild postsurgical perihilar edema and scattered atelectasis. Continued  follow-up to exclude infectious component.    I have personally reviewed the examination and initial interpretation  and I agree with the findings.    JUVENAL GALDAMEZ MD         SYSTEM ID:  [id number]   XR Chest Port 1 View    Narrative    EXAM: XR CHEST PORT 1 VIEW  4/11/2023 1:00 AM     HISTORY:  chest tubes s/p tricuspid valve replacement       COMPARISON:  Chest x-ray 4/10/2023    FINDINGS:   AP view of the chest. Postsurgical changes of tricuspid valve  replacement. Intact sternotomy wires. Endotracheal tube tip is  approximately 4.2 cm above the ermelinda. Enteric tube sidehole overlies  the stomach; the tip is out of the field-of-view. Left chest wall  pacemaker with leads in stable position. Right IJ central venous  catheter tip overlies the high SVC. Stable right chest tubes and  mediastinal drains.    Stable enlarged heart size. Stable perihilar opacities. No significant  pleural effusion. No pneumothorax. Subcutaneous emphysema in the right  supraclavicular region.      Impression    IMPRESSION:   1. Stable mild perihilar atelectasis/edema.  2. Stable support devices.    I have personally reviewed the examination and initial interpretation  and I agree with the findings.    RAMONA COELLO MD         SYSTEM ID:  [id number]

## 2023-04-12 NOTE — PROGRESS NOTES
04/12/23 1400   Appointment Info   Signing Clinician's Name / Credentials (PT) Lionel Bravo DPFRANCESCA   Living Environment   People in Home spouse   Current Living Arrangements house   Home Accessibility stairs to enter home;stairs within home   Number of Stairs, Main Entrance 3   Stair Railings, Main Entrance railings safe and in good condition   Number of Stairs, Within Home, Primary   (1/2 flight to basement, does not need to access)   Transportation Anticipated family or friend will provide   Living Environment Comments Patient lives in rambler style home with , all needs met on main floor.   Self-Care   Usual Activity Tolerance moderate   Current Activity Tolerance fair   Regular Exercise Yes   Activity/Exercise Type   (pt reports using stepper machine, walking)   Equipment Currently Used at Home walker, rolling;shower chair  (4WW)   Fall history within last six months no   Activity/Exercise/Self-Care Comment Patient reports IND with mobility at home, uses 4WW for community mobility. Mod IND with ADLs, PRN assist from . History of R shoulder pain and R wrist fusion as well as R ankle fusion.   General Information   Onset of Illness/Injury or Date of Surgery 04/10/23   Referring Physician Flores Steinberg PA-C   Patient/Family Therapy Goals Statement (PT) to return home   Pertinent History of Current Problem (include personal factors and/or comorbidities that impact the POC) Amelia Michel is a 62 year old adult with PMH of cardiac arrest (2017) NSVT s/p PVC ablation (11/22), HFpEF, mildly reduced RV function, afib/aflutter, CKD III, severe tricuspid regurgitation. Now POD#2 s/p mini thoracotomy and TVR (Porcine 29 mm valve) with Dr. Cope 4/10/23.   Existing Precautions/Restrictions fall;cardiac   Cognition   Affect/Mental Status (Cognition) WNL   Orientation Status (Cognition) oriented x 4   Pain Assessment   Patient Currently in Pain Yes, see Vital Sign flowsheet   Integumentary/Edema    Integumentary/Edema Comments chronic skin changes in BLEs   Posture    Posture Protracted shoulders;Kyphosis   Range of Motion (ROM)   ROM Comment BLE WFL   Strength (Manual Muscle Testing)   Strength Comments generalized deconditioning, demonstrates BLE strength WFL with mobility   Bed Mobility   Comment, (Bed Mobility) supine>sit min A and HOB elevated   Transfers   Comment, (Transfers) sit<>stand min A and FWW   Gait/Stairs (Locomotion)   Comment, (Gait/Stairs) small steps in room with CGA and FWW, short step length   Balance   Balance Comments good sitting balance, impaired static/dynamic standing balance requiring FWW and up to min A for LOB   Sensory Examination   Sensory Perception Comments baseline neuropathy in BLEs, L>R   Clinical Impression   Criteria for Skilled Therapeutic Intervention Yes, treatment indicated   PT Diagnosis (PT) impaired functional mobility   Influenced by the following impairments strength, balance, activity tolerance, pain   Functional limitations due to impairments bed mobility, transfers, gait, stairs   Clinical Presentation (PT Evaluation Complexity) Evolving/Changing   Clinical Presentation Rationale PMH/comorbidities, clinical judgement   Clinical Decision Making (Complexity) moderate complexity   Planned Therapy Interventions (PT) balance training;bed mobility training;gait training;patient/family education;strengthening;transfer training;progressive activity/exercise;risk factor education;home program guidelines   Risk & Benefits of therapy have been explained evaluation/treatment results reviewed;care plan/treatment goals reviewed   Physical Therapy Goals   PT Frequency 6x/week   PT Predicted Duration/Target Date for Goal Attainment 04/21/23   PT Goals Bed Mobility;Transfers;Gait;Stairs   PT: Bed Mobility Independent;Supine to/from sit   PT: Transfers Modified independent;Sit to/from stand;Assistive device   PT: Gait Modified independent;Assistive device;150 feet   PT:  Stairs Supervision/stand-by assist;3 stairs   PT Discharge Planning   PT Discharge Recommendation (DC Rec) Transitional Care Facility;home with assist;home with home care physical therapy   PT Rationale for DC Rec Patient below functional baseline, currently requiring assist for all mobility and would recommend TCU stay. However, anticipate patient may progress to discharge home wtih assist from spouse and HH therapies, will need to demonstrates safe ambulation and stair navigation, will continue to monitor.

## 2023-04-12 NOTE — PLAN OF CARE
Goal Outcome Evaluation:      Plan of Care Reviewed With: patient    Overall Patient Progress: no changeOverall Patient Progress: no change    Outcome Evaluation: Requiring levo. Worked with therapies.    Major Shift Events:  HR 120s irregular, this am. BP soft. MAPs low 60s and SBP 80s. CVTS team notified and at bedside. Gave 250mL LR bolus with some improvement in pressures and urine output (see flowsheets). Levo eventually started to keep MAP >65. Diltiazem gtt ordered but not started due to HR self converting at 1130, team aware. Diuresed with torsemide and metolazone, net negative 1.3L so far today. K replaced per protocol. Huynh removed, external cathter placed.  Up in chair with OT and worked with PT this afternoon. Now on RA.     Plan: Wean levo as tolerated. Continue to work with therapies.        For vital signs and complete assessments, please see documentation flowsheets.

## 2023-04-12 NOTE — PROGRESS NOTES
Pain Service Progress Note  [facility]  Date: 04/12/2023       Patient Name: Amelia Michel  MRN: [id number]  Age: 62 year old  Sex: adult      Assessment:  Amelia Michel is a 62 year old female with PMH significant for cardiac arrest (2017) NSVT s/p PVC ablation (11/22), HFpEF, mildly reduced RV function, afib/aflutter, CKD III, severe tricuspid regurgitation    Procedure: s/p mini thoracotomy and TVR (using 29 mm porcine valve)    Date of Surgery: 4/10/23     Date of Catheter Placement: 4/10/23     Plan/Recommendations:  1. Regional Anesthesia/Analgesia  -Continuous Catheter Type/Site: right erector spinae (ES) T5-6  Infusate: ropivacaine 0.2%  Programmed Intermittent Bolus (PIB) at 10mL Q60 min    0935 Cinician administered nerve block bolus. VSS, MAP 60s.  PF BUPivacaine 0.25%, total bolus 5 mL via right catheter, administered without complication, negative aspirate before and during administration.  No symptoms of local anesthetic systemic toxicity (LAST). Remained with and assessed patient for 10 min post-injection. BP, P, and MAP stable.  Bedside RN aware of need to continue to monitoring and document BP, P, and MAP Q 10 min for an additional 20 min. Contact Pain Service if any of the following: patient experiencing any untoward effects, SBP < 90, P < 50 or > 120, MAP < 60     0948 Follow up: Patient reports pain improvement, feels like she can relax back into pillow and take deeper breaths      Plan to maintain catheter while chest tube in place, max of 7 days    2. Anticoagulation  -Please contact Inpatient Pain Service before ordering or making any anticoagulation changes       3. Multimodal Analgesia  - per primary service    Pain Service will continue to follow.      Discussed with attending anesthesiologist    ELIZABETH Canchola CNP  04/12/2023     Overnight Events: None    Tubes/Drains: Yes  3 chest tubes, brand catheter    Subjective:  I can feel the pressure  "in my chest, it means my pain will be getting worse.   Explained and patient agrees with plan for low dose local anesthetic bolus   Nausea: No  Vomiting: No  Symptoms of LAST: No    Pain Location:  Anterior chest wall, chest tube sites    Pain Intensity:    Pain at Rest: 3/10   Pain with Activity: 5/10  Comfort Goal: 3/10   Baseline Pain: NA   Satisfied with your level of pain control: Yes    Diet: Advance Diet as Tolerated: Regular Diet Adult; Regular Diet Adult; 2 gm NA Diet    Relevant Labs:  Recent Labs   Lab Test 04/12/23  0355 04/11/23  0350 04/10/23  1327   INR  --   --  1.71*   PLT 76*   < > 76*   PTT  --   --  61*   BUN 48.1*   < > 24.7*    < > = values in this interval not displayed.       Physical Exam:  Vitals: BP (!) 85/57   Pulse (!) 129   Temp 99.3  F (37.4  C)   Resp 21   Ht 1.448 m (4' 9\")   Wt 49.2 kg (108 lb 7.5 oz)   SpO2 100%   BMI 23.47 kg/m      Physical Exam:   Orientation:  Alert, oriented, and in no acute distress: Yes  Sedation: No    Motor Examination:  5/5 Strength in lower extremities: Yes      Catheter Site:   Catheter entry site is clean/dry/intact: Yes    Tender: No      Relevant Medications:  Current Pain Medications:  Medications related to Pain Management (From now, onward)    Start     Dose/Rate Route Frequency Ordered Stop    04/13/23 0000  acetaminophen (TYLENOL) tablet 650 mg         650 mg Oral EVERY 4 HOURS PRN 04/10/23 1309      04/11/23 0800  polyethylene glycol (MIRALAX) Packet 17 g         17 g Oral DAILY 04/10/23 1309      04/11/23 0800  aspirin (ASA) chewable tablet 81 mg         81 mg Oral or NG Tube DAILY 04/10/23 1309      04/10/23 2000  senna-docusate (SENOKOT-S/PERICOLACE) 8.6-50 MG per tablet 1 tablet         1 tablet Oral 2 TIMES DAILY 04/10/23 1309      04/10/23 1330  acetaminophen (TYLENOL) tablet 975 mg         975 mg Oral EVERY 8 HOURS 04/10/23 1309 04/13/23 1329    04/10/23 1309  magnesium hydroxide (MILK OF MAGNESIA) suspension 30 mL         30 mL " "Oral DAILY PRN 04/10/23 1309      04/10/23 1309  bisacodyl (DULCOLAX) suppository 10 mg         10 mg Rectal DAILY PRN 04/10/23 1309      04/10/23 1309  HYDROmorphone (DILAUDID) injection 0.2 mg        See Hyperspace for full Linked Orders Report.    0.2 mg Intravenous EVERY 2 HOURS PRN 04/10/23 1309      04/10/23 1309  HYDROmorphone (DILAUDID) injection 0.4 mg        See Hyperspace for full Linked Orders Report.    0.4 mg Intravenous EVERY 2 HOURS PRN 04/10/23 1309      04/10/23 1309  oxyCODONE (ROXICODONE) tablet 5 mg        See Hyperspace for full Linked Orders Report.    5 mg Oral EVERY 4 HOURS PRN 04/10/23 1309      04/10/23 1309  oxyCODONE IR (ROXICODONE) tablet 10 mg        See Hyperspace for full Linked Orders Report.    10 mg Oral EVERY 4 HOURS PRN 04/10/23 1309      04/10/23 1309  lidocaine 1 % 0.1-1 mL         0.1-1 mL Other EVERY 1 HOUR PRN 04/10/23 1309      04/10/23 1309  lidocaine (LMX4) cream          Topical EVERY 1 HOUR PRN 04/10/23 1309      04/10/23 1030  ropivacaine 0.2% in NS perineural infusion (simple)          Perineural Continuous Nerve Block 04/10/23 1023            Primary Service Contacted with Recommendations? No      Please see A&P for additional details of medical decision making.      Acute Inpatient Pain Service [facility]  Hours of pain coverage 24/7   Page via Amcom- Please Page the Pain Team Via Amcom: \"PAIN MANAGEMENT ACUTE INPATIENT/ [facility] [place]\"             "

## 2023-04-13 ENCOUNTER — APPOINTMENT (OUTPATIENT)
Dept: PHYSICAL THERAPY | Facility: CLINIC | Age: 63
DRG: 220 | End: 2023-04-13
Attending: THORACIC SURGERY (CARDIOTHORACIC VASCULAR SURGERY)
Payer: COMMERCIAL

## 2023-04-13 ENCOUNTER — APPOINTMENT (OUTPATIENT)
Dept: GENERAL RADIOLOGY | Facility: CLINIC | Age: 63
DRG: 220 | End: 2023-04-13
Attending: THORACIC SURGERY (CARDIOTHORACIC VASCULAR SURGERY)
Payer: COMMERCIAL

## 2023-04-13 LAB
ALBUMIN SERPL BCG-MCNC: 3.6 G/DL (ref 3.5–5.2)
ALP SERPL-CCNC: 89 U/L (ref 35–129)
ALT SERPL W P-5'-P-CCNC: 39 U/L (ref 10–50)
ANION GAP SERPL CALCULATED.3IONS-SCNC: 13 MMOL/L (ref 7–15)
AST SERPL W P-5'-P-CCNC: 55 U/L (ref 10–50)
ATRIAL RATE - MUSE: 93 BPM
BILIRUB SERPL-MCNC: 1.7 MG/DL
BUN SERPL-MCNC: 35 MG/DL (ref 8–23)
CA-I BLD-MCNC: 4.7 MG/DL (ref 4.4–5.2)
CALCIUM SERPL-MCNC: 9 MG/DL (ref 8.8–10.2)
CHLORIDE SERPL-SCNC: 94 MMOL/L (ref 98–107)
CREAT SERPL-MCNC: 0.91 MG/DL (ref 0.51–1.17)
DEPRECATED HCO3 PLAS-SCNC: 31 MMOL/L (ref 22–29)
DIASTOLIC BLOOD PRESSURE - MUSE: NORMAL MMHG
ERYTHROCYTE [DISTWIDTH] IN BLOOD BY AUTOMATED COUNT: 13.8 % (ref 10–15)
GFR SERPL CREATININE-BSD FRML MDRD: 71 ML/MIN/1.73M2
GLUCOSE BLDC GLUCOMTR-MCNC: 100 MG/DL (ref 70–99)
GLUCOSE BLDC GLUCOMTR-MCNC: 112 MG/DL (ref 70–99)
GLUCOSE BLDC GLUCOMTR-MCNC: 147 MG/DL (ref 70–99)
GLUCOSE BLDC GLUCOMTR-MCNC: 189 MG/DL (ref 70–99)
GLUCOSE BLDC GLUCOMTR-MCNC: 90 MG/DL (ref 70–99)
GLUCOSE SERPL-MCNC: 88 MG/DL (ref 70–99)
HCT VFR BLD AUTO: 32.6 % (ref 35–53)
HGB BLD-MCNC: 11.2 G/DL (ref 11.7–17.7)
INTERPRETATION ECG - MUSE: NORMAL
MAGNESIUM SERPL-MCNC: 2 MG/DL (ref 1.7–2.3)
MCH RBC QN AUTO: 36.2 PG (ref 26.5–33)
MCHC RBC AUTO-ENTMCNC: 34.4 G/DL (ref 31.5–36.5)
MCV RBC AUTO: 106 FL (ref 78–100)
P AXIS - MUSE: NORMAL DEGREES
PHOSPHATE SERPL-MCNC: 2.7 MG/DL (ref 2.5–4.5)
PLATELET # BLD AUTO: 85 10E3/UL (ref 150–450)
POTASSIUM SERPL-SCNC: 2.6 MMOL/L (ref 3.4–5.3)
POTASSIUM SERPL-SCNC: 2.6 MMOL/L (ref 3.4–5.3)
POTASSIUM SERPL-SCNC: 3.6 MMOL/L (ref 3.4–5.3)
PR INTERVAL - MUSE: NORMAL MS
PROT SERPL-MCNC: 6.4 G/DL (ref 6.4–8.3)
QRS DURATION - MUSE: 150 MS
QT - MUSE: 384 MS
QTC - MUSE: 545 MS
R AXIS - MUSE: 228 DEGREES
RBC # BLD AUTO: 3.09 10E6/UL (ref 3.8–5.9)
SODIUM SERPL-SCNC: 138 MMOL/L (ref 136–145)
SYSTOLIC BLOOD PRESSURE - MUSE: NORMAL MMHG
T AXIS - MUSE: 3 DEGREES
VENTRICULAR RATE- MUSE: 121 BPM
WBC # BLD AUTO: 9.4 10E3/UL (ref 4–11)

## 2023-04-13 PROCEDURE — 99233 SBSQ HOSP IP/OBS HIGH 50: CPT | Mod: 24 | Performed by: STUDENT IN AN ORGANIZED HEALTH CARE EDUCATION/TRAINING PROGRAM

## 2023-04-13 PROCEDURE — 84132 ASSAY OF SERUM POTASSIUM: CPT | Performed by: INTERNAL MEDICINE

## 2023-04-13 PROCEDURE — 250N000013 HC RX MED GY IP 250 OP 250 PS 637: Performed by: STUDENT IN AN ORGANIZED HEALTH CARE EDUCATION/TRAINING PROGRAM

## 2023-04-13 PROCEDURE — 82330 ASSAY OF CALCIUM: CPT | Performed by: PHYSICIAN ASSISTANT

## 2023-04-13 PROCEDURE — 71045 X-RAY EXAM CHEST 1 VIEW: CPT

## 2023-04-13 PROCEDURE — 250N000011 HC RX IP 250 OP 636: Performed by: NURSE PRACTITIONER

## 2023-04-13 PROCEDURE — 83735 ASSAY OF MAGNESIUM: CPT | Performed by: STUDENT IN AN ORGANIZED HEALTH CARE EDUCATION/TRAINING PROGRAM

## 2023-04-13 PROCEDURE — 250N000011 HC RX IP 250 OP 636: Performed by: STUDENT IN AN ORGANIZED HEALTH CARE EDUCATION/TRAINING PROGRAM

## 2023-04-13 PROCEDURE — 250N000011 HC RX IP 250 OP 636

## 2023-04-13 PROCEDURE — 250N000013 HC RX MED GY IP 250 OP 250 PS 637: Performed by: PHYSICIAN ASSISTANT

## 2023-04-13 PROCEDURE — 999N000155 HC STATISTIC RAPCV CVP MONITORING

## 2023-04-13 PROCEDURE — 200N000002 HC R&B ICU UMMC

## 2023-04-13 PROCEDURE — 97530 THERAPEUTIC ACTIVITIES: CPT | Mod: GP

## 2023-04-13 PROCEDURE — 80053 COMPREHEN METABOLIC PANEL: CPT | Performed by: STUDENT IN AN ORGANIZED HEALTH CARE EDUCATION/TRAINING PROGRAM

## 2023-04-13 PROCEDURE — 71045 X-RAY EXAM CHEST 1 VIEW: CPT | Mod: 26 | Performed by: RADIOLOGY

## 2023-04-13 PROCEDURE — 250N000013 HC RX MED GY IP 250 OP 250 PS 637: Performed by: THORACIC SURGERY (CARDIOTHORACIC VASCULAR SURGERY)

## 2023-04-13 PROCEDURE — 250N000013 HC RX MED GY IP 250 OP 250 PS 637

## 2023-04-13 PROCEDURE — 85027 COMPLETE CBC AUTOMATED: CPT | Performed by: STUDENT IN AN ORGANIZED HEALTH CARE EDUCATION/TRAINING PROGRAM

## 2023-04-13 PROCEDURE — 99232 SBSQ HOSP IP/OBS MODERATE 35: CPT | Performed by: NURSE PRACTITIONER

## 2023-04-13 PROCEDURE — 84132 ASSAY OF SERUM POTASSIUM: CPT | Performed by: STUDENT IN AN ORGANIZED HEALTH CARE EDUCATION/TRAINING PROGRAM

## 2023-04-13 PROCEDURE — 271N000002 HC RX 271

## 2023-04-13 PROCEDURE — 999N000157 HC STATISTIC RCP TIME EA 10 MIN

## 2023-04-13 PROCEDURE — 97116 GAIT TRAINING THERAPY: CPT | Mod: GP

## 2023-04-13 PROCEDURE — 250N000011 HC RX IP 250 OP 636: Performed by: PHYSICIAN ASSISTANT

## 2023-04-13 PROCEDURE — 250N000012 HC RX MED GY IP 250 OP 636 PS 637: Performed by: THORACIC SURGERY (CARDIOTHORACIC VASCULAR SURGERY)

## 2023-04-13 PROCEDURE — 84100 ASSAY OF PHOSPHORUS: CPT | Performed by: STUDENT IN AN ORGANIZED HEALTH CARE EDUCATION/TRAINING PROGRAM

## 2023-04-13 RX ORDER — POTASSIUM CHLORIDE 29.8 MG/ML
20 INJECTION INTRAVENOUS
Status: COMPLETED | OUTPATIENT
Start: 2023-04-13 | End: 2023-04-13

## 2023-04-13 RX ORDER — AMOXICILLIN 250 MG
1 CAPSULE ORAL 2 TIMES DAILY PRN
Status: DISCONTINUED | OUTPATIENT
Start: 2023-04-13 | End: 2023-04-17 | Stop reason: HOSPADM

## 2023-04-13 RX ORDER — POLYETHYLENE GLYCOL 3350 17 G/17G
17 POWDER, FOR SOLUTION ORAL DAILY PRN
Status: DISCONTINUED | OUTPATIENT
Start: 2023-04-13 | End: 2023-04-17 | Stop reason: HOSPADM

## 2023-04-13 RX ORDER — POTASSIUM CHLORIDE 750 MG/1
20 TABLET, EXTENDED RELEASE ORAL ONCE
Status: COMPLETED | OUTPATIENT
Start: 2023-04-13 | End: 2023-04-13

## 2023-04-13 RX ORDER — MAGNESIUM OXIDE 400 MG/1
400 TABLET ORAL EVERY 4 HOURS
Status: COMPLETED | OUTPATIENT
Start: 2023-04-13 | End: 2023-04-13

## 2023-04-13 RX ORDER — NOREPINEPHRINE BITARTRATE 0.06 MG/ML
.01-.6 INJECTION, SOLUTION INTRAVENOUS CONTINUOUS
Status: DISCONTINUED | OUTPATIENT
Start: 2023-04-13 | End: 2023-04-14

## 2023-04-13 RX ORDER — POTASSIUM CHLORIDE 1.5 G/1.58G
20 POWDER, FOR SOLUTION ORAL
Status: DISCONTINUED | OUTPATIENT
Start: 2023-04-13 | End: 2023-04-14

## 2023-04-13 RX ORDER — GABAPENTIN 100 MG/1
100 CAPSULE ORAL 3 TIMES DAILY
Status: DISCONTINUED | OUTPATIENT
Start: 2023-04-13 | End: 2023-04-17 | Stop reason: HOSPADM

## 2023-04-13 RX ORDER — METHOCARBAMOL 500 MG/1
500 TABLET, FILM COATED ORAL 4 TIMES DAILY
Status: DISCONTINUED | OUTPATIENT
Start: 2023-04-13 | End: 2023-04-17 | Stop reason: HOSPADM

## 2023-04-13 RX ORDER — BUPIVACAINE HYDROCHLORIDE 2.5 MG/ML
10 INJECTION, SOLUTION EPIDURAL; INFILTRATION; INTRACAUDAL ONCE
Status: COMPLETED | OUTPATIENT
Start: 2023-04-13 | End: 2023-04-13

## 2023-04-13 RX ADMIN — HEPARIN SODIUM 5000 UNITS: 5000 INJECTION, SOLUTION INTRAVENOUS; SUBCUTANEOUS at 11:02

## 2023-04-13 RX ADMIN — METHOCARBAMOL 500 MG: 500 TABLET ORAL at 16:13

## 2023-04-13 RX ADMIN — Medication 400 MG: at 04:47

## 2023-04-13 RX ADMIN — METHOCARBAMOL 500 MG: 500 TABLET ORAL at 20:50

## 2023-04-13 RX ADMIN — TORSEMIDE 20 MG: 20 TABLET ORAL at 08:15

## 2023-04-13 RX ADMIN — METHOCARBAMOL 500 MG: 500 TABLET ORAL at 12:30

## 2023-04-13 RX ADMIN — METOLAZONE 2.5 MG: 2.5 TABLET ORAL at 08:36

## 2023-04-13 RX ADMIN — POTASSIUM & SODIUM PHOSPHATES POWDER PACK 280-160-250 MG 1 PACKET: 280-160-250 PACK at 08:14

## 2023-04-13 RX ADMIN — MUPIROCIN 0.5 G: 20 OINTMENT TOPICAL at 08:36

## 2023-04-13 RX ADMIN — POTASSIUM CHLORIDE 20 MEQ: 29.8 INJECTION, SOLUTION INTRAVENOUS at 05:40

## 2023-04-13 RX ADMIN — ASPIRIN 81 MG 81 MG: 81 TABLET ORAL at 08:15

## 2023-04-13 RX ADMIN — POTASSIUM CHLORIDE 20 MEQ: 1.5 POWDER, FOR SOLUTION ORAL at 16:13

## 2023-04-13 RX ADMIN — Medication 500 MG: at 22:36

## 2023-04-13 RX ADMIN — POTASSIUM CHLORIDE 20 MEQ: 29.8 INJECTION, SOLUTION INTRAVENOUS at 06:47

## 2023-04-13 RX ADMIN — Medication 10 MG: at 21:49

## 2023-04-13 RX ADMIN — POTASSIUM & SODIUM PHOSPHATES POWDER PACK 280-160-250 MG 1 PACKET: 280-160-250 PACK at 04:47

## 2023-04-13 RX ADMIN — POLYETHYLENE GLYCOL 3350 17 G: 17 POWDER, FOR SOLUTION ORAL at 08:09

## 2023-04-13 RX ADMIN — Medication 3 MG: at 12:29

## 2023-04-13 RX ADMIN — GABAPENTIN 100 MG: 100 CAPSULE ORAL at 14:59

## 2023-04-13 RX ADMIN — ACETAMINOPHEN 975 MG: 325 TABLET ORAL at 04:47

## 2023-04-13 RX ADMIN — POTASSIUM CHLORIDE 20 MEQ: 750 TABLET, EXTENDED RELEASE ORAL at 04:47

## 2023-04-13 RX ADMIN — HYDROXYCHLOROQUINE SULFATE 200 MG: 200 TABLET, FILM COATED ORAL at 20:50

## 2023-04-13 RX ADMIN — Medication 400 MG: at 08:15

## 2023-04-13 RX ADMIN — POTASSIUM CHLORIDE 20 MEQ: 750 TABLET, EXTENDED RELEASE ORAL at 12:30

## 2023-04-13 RX ADMIN — HEPARIN SODIUM 5000 UNITS: 5000 INJECTION, SOLUTION INTRAVENOUS; SUBCUTANEOUS at 20:51

## 2023-04-13 RX ADMIN — PANTOPRAZOLE SODIUM 40 MG: 40 TABLET, DELAYED RELEASE ORAL at 08:15

## 2023-04-13 RX ADMIN — HEPARIN SODIUM 5000 UNITS: 5000 INJECTION, SOLUTION INTRAVENOUS; SUBCUTANEOUS at 03:23

## 2023-04-13 RX ADMIN — BUPIVACAINE HYDROCHLORIDE 25 MG: 2.5 INJECTION, SOLUTION EPIDURAL; INFILTRATION; INTRACAUDAL; PERINEURAL at 09:43

## 2023-04-13 RX ADMIN — SENNOSIDES AND DOCUSATE SODIUM 1 TABLET: 50; 8.6 TABLET ORAL at 08:15

## 2023-04-13 RX ADMIN — GABAPENTIN 100 MG: 100 CAPSULE ORAL at 20:50

## 2023-04-13 RX ADMIN — POTASSIUM CHLORIDE 20 MEQ: 29.8 INJECTION, SOLUTION INTRAVENOUS at 04:33

## 2023-04-13 ASSESSMENT — ACTIVITIES OF DAILY LIVING (ADL)
ADLS_ACUITY_SCORE: 29
ADLS_ACUITY_SCORE: 28
ADLS_ACUITY_SCORE: 29
ADLS_ACUITY_SCORE: 28
ADLS_ACUITY_SCORE: 31
ADLS_ACUITY_SCORE: 29
ADLS_ACUITY_SCORE: 29
ADLS_ACUITY_SCORE: 28
ADLS_ACUITY_SCORE: 33
ADLS_ACUITY_SCORE: 29

## 2023-04-13 NOTE — PROGRESS NOTES
At 1930 6ml of 0.125% bupivacaine was bolused through epidural catheter after negative aspiration.  The patient remained hemodynamically stable and cudd was cycled every 10 mins.  There were no signs of LAST or intrathecal injection.    Tania Oliver MD  Anesthesiology, CA-2

## 2023-04-13 NOTE — PROGRESS NOTES
"SPIRITUAL HEALTH SERVICES  SPIRITUAL ASSESSMENT Progress Note  [facility] ([place]) 4E     REFERRAL SOURCE: Routine Consult     Amelia and her  were present at time of visit.  She expressed that she has been having \"outbursts\" while in the hospital stemming from her feelings of lack of control.  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  She also said that she loves being outside in nice weather, and having to stay indoors while it is nathan outside has been hard.   normalized her feelings and encouraged her to give herself some compassion and mando as she navigates her hospitalization.  We explored ways that she can cope while she's here, including spending time with her .  She said that she wants to \"be the best patient they have\" and  affirmed that and said its also okay to feel how she feels, and express her emotions in ways that are consistent with who she is.   offered prayer for healing and peace at patient's request.      PLAN: [facility] will remain available for ongoing visits     Gala Rosales  Pager: [phone]    "

## 2023-04-13 NOTE — PROGRESS NOTES
CV ICU PROGRESS NOTE  Amelia Michel  [id number]  Admitted: 4/10/2023  5:08 AM      ASSESSMENT  Amelia Michel is a 62 year old adult with PMH of cardiac arrest (2017) NSVT s/p PVC ablation (11/22), HFpEF, mildly reduced RV function, afib/aflutter, CKD III, severe tricuspid regurgitation. Now POD#2 s/p mini thoracotomy and TVR (Porcine 29 mm valve) with Dr. Cope 4/10/23.      Updates/Changes Today:  - Spontaneously returned back to dual paced 75 BPM  - Will hold afternoon torsemide 20 mg in setting of CVP <10, MAP requiring pressor support    - Actively replacing potassium with recheck PM  - Start po potassium 20 mEq BID  - Add Robaxin 500 mg Q6H scheduled analgesia    PLAN:  Neuro/ pain/ sedation:  # Acute post-operative pain  - Scheduled: Tylenol 975 Q8H, Gabapentin 100 mg TID, Robaxin 500 mg Q6H  - PRN: Tylenol, Dilaudid, Oxycodone  - Regional anesthesia: Right sided COOPER catheter     Pulmonary:    # Acute post-operative respiratory insufficiency  - Supplemental oxygen, wean with goal SpO2 > 92%  - Encourage IS and flutter valve Q10-15 minutes once extubated      Cardiovascular:    # Hypertension   # Hx VSD s/p repair (1969)  # Hx of Vfib cardiac arrest (2017)  # Chronic diastolic heart failure NYHA Class III  # HFpEF 55-60%   # Mildly reduced RV function  # Severe tricuspid regurgitation s/p TVR   # Cardiogenic shock  - Goal MAP >65, SBP <140, monitor hemodynamics  - Diuresis: restart PTA metolazone, torsemide   - Plan to hold afternoon torsemide 20 mg in setting of CVP <10, loose stool, MAP requiring pressor support   - Hold PTA spironolactone, empagliflozin  - ASA: 82 mg      # Hx of Vfib cardiac arrest (2017)  # SSS s/p PPM (2019)  # Frequent, NSVT s/p VT ablation (11/30/2022)  # Hx of afib/aflutter   Dual chamber PPM (2019), Inherent Pacing mode DDDR  - Device check 4/11 VVIR changed to DDDR  BPM  [id number] 2 on PTA abixaban  - Hold PTA apixaban     GI/Nutrition:   - Regular diet  - PPI  prophylaxis     Fluids/ Electrolytes/Renal:   # Chronic kidney disease, III  # Chronic hyponatremia   Baseline Cr appears to be ~1.0-1.2. Baseline Na 127-132  - Strict I/O, daily weights, avoid/limit nephrotoxins  - Replete lytes PRN per protocol  - Potassium po 20 mEq BID     Endocrine:    # Stress hyperglycemia  Pre op HgA1c 5.9  - MDSSI  - Goal BG <180 for optimal healing      ID/ Antibiotics:  - Monitor fever curve, WBC, and inflammatory markers as appropriate     Heme:   # Hx DLBCL s/p CHOP in remission     # Acute blood loss anemia  #Post-CPB  thrombocytopenia  *Hx hives after plt transfusion. Plan pre-treat diphenhydramine if needs transfusion  No s/sx active bleeding  Postoperative thrombocytopenia stable >70  - Daily CBC   - Hgb goal > 7.0     MSK / Skin  #Rheumatoid arthritis  #Sternotomy  #Surgical Incision  Sternal precautions  - Postoperative incision management per protocol  - PT/OT/CR  - Continue PTA prednisone 3 mg daily  - Continue PTA Plaquenil 200 mg at bedtime      Prophylaxis:    - Mechanical DVT ppx  - Chemical DVT ppx: SQH  - PPI     Lines/ tubes/ drains:  - RIJ CVC  - CT x3 (1 R pleural, 2 mediastinal)  - Epicardial V pacing wire x1  - R COOPER catheter      Disposition:  - CV ICU.      Patient seen, findings and plan discussed with CVICU staff Dr. Adam.     Jessenia Ravi MD   PGY2, Dept. of Anesthesiology  [facility] [id number]    ====================================    TODAY'S PROGRESS  SUBJECTIVE  NAEO. Nursing notes reviewed.  Accelerated rhythm converted back to NSR 4/12 without rate intervention. Remained in NSR overnight, on and off epi gtt for soft pressures. Increased UOP, ended neg negative > 1L after restarting PTA diuretics. Actively repleting lytes this am. Pain intermittently controlled -  receiving clinician bolus to COOPER catheter, adding Robaxin to scheduled meds today. Loose stools x2 this morning, holding bowel regimen. Tolerating regular diet. Family at bedside.     OBJECTIVE  1. VITAL  SIGNS  Temp:  [98.2  F (36.8  C)-99.3  F (37.4  C)] 98.4  F (36.9  C)  Pulse:  [] 83  Resp:  [8-60] 16  BP: ()/(46-80) 90/52  SpO2:  [90 %-100 %] 100 %  Resp: 16      2. INTAKE/ OUTPUT  I/O last 3 completed shifts:  In: 1034.68 [P.O.:600; I.V.:184.68; IV Piggyback:250]  Out: 2802 [Urine:2562; Chest Tube:240]    3. PHYSICAL EXAMINATION  General: comfortable appearing, up in chair, pleasant  HEENT: anicteric sclera, membranes moist  CV: normal capillary refill, AV paced 75 BPM  Respiratory: normal work of breathing, on room air. lung sounds clear bilaterally  Abdominal: soft, non-distended  Musculoskeletal: normal bulk and tone, petite  Skin: healed previous sternotomy. incision site c/d/i. CT to suction, +airleak, serosanguinous output   Neurologic: normal speech, alert and oriented x3  Psychiatric: appropriate      4. INVESTIGATIONS  Arterial Blood Gases   Recent Labs   Lab 04/11/23  1500 04/11/23  1304 04/11/23  1133 04/11/23  0350   PH 7.43 7.55* 7.47* 7.46*   PCO2 37 28* 36 37   PO2 106* 169* 150* 183*   HCO3 25 24 26 26     Complete Blood Count   Recent Labs   Lab 04/13/23  0314 04/12/23  0355 04/11/23  0350 04/10/23  1327   WBC 9.4 11.6* 8.9 11.0   HGB 11.2* 11.2* 11.6* 12.1   PLT 85* 76* 73* 76*     Basic Metabolic Panel  Recent Labs   Lab 04/13/23  0314 04/12/23  2129 04/12/23  2102 04/12/23  1845 04/12/23  1707 04/12/23  1515 04/12/23  1119 04/12/23  0358 04/12/23  0355 04/11/23  0752 04/11/23  0350 04/10/23  1329 04/10/23  1327     --   --   --   --   --   --   --  134*  --  136  --  137   POTASSIUM 2.6*  2.6*  --  2.7* 3.1*  --  3.1*  --   --  3.9   < > 3.9   < > 3.4   CHLORIDE 94*  --   --   --   --   --   --   --  97*  --  101  --  99   CO2 31*  --   --   --   --   --   --   --  24  --  20*  --  21*   BUN 35.0*  --   --   --   --   --   --   --  48.1*  --  31.2*  --  24.7*   CR 0.91  --   --   --   --   --   --   --  1.45*  --  1.11  --  1.13   GLC 88 101*  --   --  97  --  97   < > 99    < > 145*   < > 82    < > = values in this interval not displayed.     Liver Function Tests  Recent Labs   Lab 04/13/23  0314 04/12/23  0355 04/11/23  0350 04/10/23  1327 04/10/23  1210 04/10/23  0604   AST 55* 89* 113* 105*  --   --    ALT 39 53* 53* 42  --   --    ALKPHOS 89 85 73 83  --   --    BILITOTAL 1.7* 1.3* 1.5* 1.9*  --   --    ALBUMIN 3.6 3.9 3.2* 3.4*  --   --    INR  --   --   --  1.71* 1.76* 1.20*     Pancreatic Enzymes  No lab results found in last 7 days.  Coagulation Profile  Recent Labs   Lab 04/10/23  1327 04/10/23  1210 04/10/23  0604   INR 1.71* 1.76* 1.20*   PTT 61* 35 29     Lactate  Invalid input(s): LACTATE    5. RADIOLOGY  Recent Results (from the past 24 hour(s))   XR Chest Port 1 View    Narrative    Examination: XR CHEST PORT 1 VIEW 4/10/2023 2:50 PM    Indication: Post Op CVTS Surgery    Comparison: X-ray 3/17/2023    Findings:  AP supine portable chest. Postoperative changes of tricuspid valve  repair. Median sternotomy wires, and mild perihilar haziness with mild  basilar atelectasis. Stable cardiomegaly. Right basilar chest tube. No  appreciable pneumothorax. Mediastinal surgical drains. Enteric tube  terminates over the stomach. No significant pleural effusions.  Endotracheal tube projects over the midthoracic trachea. Left  subclavian transvenous approach pacer device with leads and overlying  soft tissue position. No focal consolidation. Unremarkable limited  upper abdomen radiographically. Stable mild convex leftward  thoracolumbar curve. Degenerative spurring throughout the visualized  vertebral column.      Impression    Impression:   Stable cardiomegaly with postsurgical changes tricuspid valve repair.  Mild postsurgical perihilar edema and scattered atelectasis. Continued  follow-up to exclude infectious component.    I have personally reviewed the examination and initial interpretation  and I agree with the findings.    JUVENAL GALDAMEZ MD         SYSTEM ID:  [id number]    XR Chest Port 1 View    Narrative    EXAM: XR CHEST PORT 1 VIEW  4/11/2023 1:00 AM     HISTORY:  chest tubes s/p tricuspid valve replacement       COMPARISON:  Chest x-ray 4/10/2023    FINDINGS:   AP view of the chest. Postsurgical changes of tricuspid valve  replacement. Intact sternotomy wires. Endotracheal tube tip is  approximately 4.2 cm above the ermelinda. Enteric tube sidehole overlies  the stomach; the tip is out of the field-of-view. Left chest wall  pacemaker with leads in stable position. Right IJ central venous  catheter tip overlies the high SVC. Stable right chest tubes and  mediastinal drains.    Stable enlarged heart size. Stable perihilar opacities. No significant  pleural effusion. No pneumothorax. Subcutaneous emphysema in the right  supraclavicular region.      Impression    IMPRESSION:   1. Stable mild perihilar atelectasis/edema.  2. Stable support devices.    I have personally reviewed the examination and initial interpretation  and I agree with the findings.    RAMONA COELLO MD         SYSTEM ID:  [id number]

## 2023-04-13 NOTE — PLAN OF CARE
ICU End of Shift Summary. See flowsheets for vital signs and detailed assessment.    Changes this shift: Neurologically intact. Endorsing chest and incisional pain, relief with scheduled Tylenol and PRN Oxy (given x1). Saturating WNL on 2L NC overnight. Afebrile. Sinus rhythm ~75bpm. Meeting MAP and systolic goals while weaning Levo, current rate of 0.05mcg/kg/min. CVPs 12-14mmHg overnight. Pure wick in place, adequate urine output see Flowsheets for data. No BM per this shift.     AM Magnesium and Phosphorus replaced per protocol.   Critical 0400 Potassium, CVTS MD notified 80 mEq replacement ordered    Plan: Continue to wean Norepinephrine. Manage pain. Pt requesting meeting in AM with spiritual services/. Will contact primary care team with questions and concerns.      Macey Davenport RN   Critical Care Flex Team     Goal Outcome Evaluation:      Plan of Care Reviewed With: patient    Overall Patient Progress: improvingOverall Patient Progress: improving    Outcome Evaluation: Weaning levo. Pt able to rest between cares. Pt endorsing pt well controlled.      Problem: Cardiovascular Surgery  Goal: Improved Activity Tolerance  Outcome: Not Progressing  Goal: Effective Bowel Elimination  Outcome: Not Progressing  Goal: Fluid and Electrolyte Balance  Outcome: Not Progressing     Problem: Plan of Care - These are the overarching goals to be used throughout the patient stay.    Goal: Plan of Care Review  Description: The Plan of Care Review/Shift note should be completed every shift.  The Outcome Evaluation is a brief statement about your assessment that the patient is improving, declining, or no change.  This information will be displayed automatically on your shift note.  Outcome: Progressing  Flowsheets (Taken 4/13/2023 0346)  Outcome Evaluation: Weaning levo. Pt able to rest between cares. Pt endorsing pt well controlled.  Plan of Care Reviewed With: patient  Overall Patient Progress: improving  Goal:  "Patient-Specific Goal (Individualized)  Description: You can add care plan individualizations to a care plan. Examples of Individualization might be:  \"Parent requests to be called daily at 9am for status\", \"I have a hard time hearing out of my right ear\", or \"Do not touch me to wake me up as it startles me\".  Outcome: Progressing  Goal: Absence of Hospital-Acquired Illness or Injury  Outcome: Progressing  Intervention: Identify and Manage Fall Risk  Recent Flowsheet Documentation  Taken 4/13/2023 0400 by Macey Davenport RN  Safety Promotion/Fall Prevention:    activity supervised    increased rounding and observation    increase visualization of patient    nonskid shoes/slippers when out of bed    supervised activity    safety round/check completed    patient and family education    lighting adjusted  Taken 4/13/2023 0000 by Macey Davenport RN  Safety Promotion/Fall Prevention:    activity supervised    increased rounding and observation    increase visualization of patient    nonskid shoes/slippers when out of bed    supervised activity    safety round/check completed    patient and family education    lighting adjusted  Taken 4/12/2023 2000 by Macey Davenport RN  Safety Promotion/Fall Prevention:    activity supervised    increased rounding and observation    increase visualization of patient    nonskid shoes/slippers when out of bed    supervised activity    safety round/check completed    patient and family education    lighting adjusted  Intervention: Prevent Skin Injury  Recent Flowsheet Documentation  Taken 4/13/2023 0400 by Macey Davenport RN  Body Position:    position changed independently    weight shifting    supine    lower extremity elevated  Taken 4/13/2023 0200 by Macey Davenport RN  Body Position:    position changed independently    weight shifting    supine    lower extremity elevated  Taken 4/13/2023 0000 by Macey Davenport RN  Body Position:    position changed independently    weight " shifting    supine    lower extremity elevated  Taken 4/12/2023 2200 by Macye Davenport RN  Body Position:    position changed independently    weight shifting    supine    lower extremity elevated  Taken 4/12/2023 2000 by Macey Davenport RN  Body Position:    position changed independently    weight shifting    supine    lower extremity elevated  Intervention: Prevent and Manage VTE (Venous Thromboembolism) Risk  Recent Flowsheet Documentation  Taken 4/13/2023 0400 by Macey Davenport RN  VTE Prevention/Management: SCDs (sequential compression devices) on  Taken 4/13/2023 0000 by Macey Davenport RN  VTE Prevention/Management: SCDs (sequential compression devices) on  Taken 4/12/2023 2000 by Macey Davenport RN  VTE Prevention/Management: SCDs (sequential compression devices) on  Goal: Optimal Comfort and Wellbeing  Outcome: Progressing  Intervention: Monitor Pain and Promote Comfort  Recent Flowsheet Documentation  Taken 4/13/2023 0400 by Macey Davenport RN  Pain Management Interventions:    care clustered    emotional support    pillow support provided    quiet environment facilitated    relaxation techniques promoted    repositioned    rest  Taken 4/13/2023 0000 by Macey Davenport RN  Pain Management Interventions:    care clustered    emotional support    pillow support provided    quiet environment facilitated    relaxation techniques promoted    repositioned    rest  Taken 4/12/2023 2000 by Macey Davenport RN  Pain Management Interventions:    care clustered    emotional support    pillow support provided    quiet environment facilitated    relaxation techniques promoted    repositioned    rest  Intervention: Provide Person-Centered Care  Recent Flowsheet Documentation  Taken 4/13/2023 0400 by Macey Davenport RN  Trust Relationship/Rapport:    care explained    choices provided    emotional support provided    empathic listening provided    questions answered    questions encouraged    reassurance  provided    thoughts/feelings acknowledged  Taken 4/13/2023 0000 by Macey Davenport RN  Trust Relationship/Rapport:    care explained    choices provided    emotional support provided    empathic listening provided    questions answered    questions encouraged    reassurance provided    thoughts/feelings acknowledged  Taken 4/12/2023 2000 by Macey Davenport RN  Trust Relationship/Rapport:    care explained    choices provided    emotional support provided    empathic listening provided    questions answered    questions encouraged    reassurance provided    thoughts/feelings acknowledged  Goal: Readiness for Transition of Care  Outcome: Progressing     Problem: Risk for Delirium  Goal: Optimal Coping  Outcome: Progressing  Goal: Improved Behavioral Control  Outcome: Progressing  Intervention: Minimize Safety Risk  Recent Flowsheet Documentation  Taken 4/13/2023 0400 by Macey Davenport RN  Trust Relationship/Rapport:    care explained    choices provided    emotional support provided    empathic listening provided    questions answered    questions encouraged    reassurance provided    thoughts/feelings acknowledged  Taken 4/13/2023 0000 by Macey Davenport RN  Trust Relationship/Rapport:    care explained    choices provided    emotional support provided    empathic listening provided    questions answered    questions encouraged    reassurance provided    thoughts/feelings acknowledged  Taken 4/12/2023 2000 by Macey Davenport RN  Trust Relationship/Rapport:    care explained    choices provided    emotional support provided    empathic listening provided    questions answered    questions encouraged    reassurance provided    thoughts/feelings acknowledged  Goal: Improved Attention and Thought Clarity  Outcome: Progressing  Goal: Improved Sleep  Outcome: Progressing     Problem: Cardiovascular Surgery  Goal: Optimal Coping with Heart Surgery  Outcome: Progressing  Goal: Absence of Bleeding  Outcome:  Progressing  Goal: Effective Cardiac Function  Outcome: Progressing  Goal: Optimal Cerebral Tissue Perfusion  Outcome: Progressing  Intervention: Protect and Optimize Cerebral Perfusion  Recent Flowsheet Documentation  Taken 4/13/2023 0400 by Macey Davenport RN  Head of Bed (HOB) Positioning: HOB at 30-45 degrees  Taken 4/13/2023 0200 by Macey Davenport RN  Head of Bed (HOB) Positioning: HOB at 20-30 degrees  Taken 4/13/2023 0000 by Macey Davenport RN  Head of Bed (HOB) Positioning: HOB at 30-45 degrees  Taken 4/12/2023 2200 by Macey Davenport RN  Head of Bed (HOB) Positioning: HOB at 20-30 degrees  Taken 4/12/2023 2000 by Macey Davenport RN  Head of Bed (HOB) Positioning: HOB at 30-45 degrees  Goal: Blood Glucose Level Within Targeted Range  Outcome: Progressing  Goal: Absence of Infection Signs and Symptoms  Outcome: Progressing  Goal: Anesthesia/Sedation Recovery  Outcome: Progressing  Intervention: Optimize Anesthesia Recovery  Recent Flowsheet Documentation  Taken 4/13/2023 0400 by Macey Davenport RN  Safety Promotion/Fall Prevention:    activity supervised    increased rounding and observation    increase visualization of patient    nonskid shoes/slippers when out of bed    supervised activity    safety round/check completed    patient and family education    lighting adjusted  Administration (IS):    instruction provided, follow-up    self-administered  Taken 4/13/2023 0000 by Macey Davenport RN  Safety Promotion/Fall Prevention:    activity supervised    increased rounding and observation    increase visualization of patient    nonskid shoes/slippers when out of bed    supervised activity    safety round/check completed    patient and family education    lighting adjusted  Administration (IS):    instruction provided, follow-up    self-administered  Taken 4/12/2023 2000 by Macey Davenport RN  Safety Promotion/Fall Prevention:    activity supervised    increased rounding and observation    increase  visualization of patient    nonskid shoes/slippers when out of bed    supervised activity    safety round/check completed    patient and family education    lighting adjusted  Administration (IS):    instruction provided, follow-up    self-administered  Goal: Acceptable Pain Control  Outcome: Progressing  Intervention: Prevent or Manage Pain  Recent Flowsheet Documentation  Taken 4/13/2023 0400 by Macey Davenport RN  Pain Management Interventions:    care clustered    emotional support    pillow support provided    quiet environment facilitated    relaxation techniques promoted    repositioned    rest  Taken 4/13/2023 0000 by Macey Davenport RN  Pain Management Interventions:    care clustered    emotional support    pillow support provided    quiet environment facilitated    relaxation techniques promoted    repositioned    rest  Taken 4/12/2023 2000 by Macey Davenport RN  Pain Management Interventions:    care clustered    emotional support    pillow support provided    quiet environment facilitated    relaxation techniques promoted    repositioned    rest  Goal: Nausea and Vomiting Relief  Outcome: Progressing  Goal: Effective Urinary Elimination  Outcome: Progressing  Goal: Effective Oxygenation and Ventilation  Outcome: Progressing  Intervention: Promote Airway Secretion Clearance  Recent Flowsheet Documentation  Taken 4/13/2023 0400 by Macey Davenport RN  Administration (IS):    instruction provided, follow-up    self-administered  Cough And Deep Breathing: done independently per patient  Taken 4/13/2023 0000 by Macey Davenport RN  Administration (IS):    instruction provided, follow-up    self-administered  Cough And Deep Breathing: done independently per patient  Taken 4/12/2023 2000 by Macey Davenport RN  Administration (IS):    instruction provided, follow-up    self-administered  Cough And Deep Breathing: done independently per patient

## 2023-04-13 NOTE — PROGRESS NOTES
"Pain Service Progress Note  [facility]  Date: 04/13/2023       Patient Name: Amelia Michel  MRN: [id number]  Age: 62 year old  Sex: adult      Assessment:  Amelia Michel is a 62 year old female with PMH significant for cardiac arrest (2017) NSVT s/p PVC ablation (11/22), HFpEF, mildly reduced RV function, afib/aflutter, CKD III, severe tricuspid regurgitation    Procedure: s/p mini thoracotomy and TVR (using 29 mm porcine valve)    Date of Surgery: 4/10/23     Date of Catheter Placement: 4/10/23     Plan/Recommendations:  1. Regional Anesthesia/Analgesia  -Continuous Catheter Type/Site: right erector spinae (ES) T5-6  Infusate: ropivacaine 0.2%  Programmed Intermittent Bolus (PIB) at 10mL Q60 min    0945 Cinician administered nerve block bolus. VSS, MAP 60s.  PF BUPivacaine 0.25%, total bolus 5 mL via right catheter, administered without complication, negative aspirate before and during administration.  No symptoms of local anesthetic systemic toxicity (LAST). Remained with and assessed patient for 10 min post-injection. BP, P, and MAP stable.  Bedside RN aware of need to continue to monitoring and document BP, P, and MAP Q 10 min for an additional 20 min. Contact Pain Service if any of the following: patient experiencing any untoward effects, SBP < 90, P < 50 or > 120, MAP < 60      Plan to maintain catheter while chest tube in place, max of 7 days    2. Anticoagulation  -Please contact Inpatient Pain Service before ordering or making any anticoagulation changes       3. Multimodal Analgesia  - per primary service    Pain Service will continue to follow.      Discussed with attending anesthesiologist    ELIZABETH Canchola CNP  04/13/2023     Overnight Events: No acute events    Tubes/Drains: Yes  3 chest tubes,     Subjective:  I would like a bolus.\" Amelia reports good pain relief with q 12 hr bolus therefore requests bolus this AM for nonopioid management. Also on " "scheduled Tylenol and has received Dilaudid IV 0.2 mg x 2 doses  Nausea: No  Symptoms of LAST: No    Pain Location:  Chest tube pain    Pain Intensity:    Pain at Rest: 0/10   Pain with Activity: 0/10  Satisfied with your level of pain control: Yes    Diet: Advance Diet as Tolerated: Regular Diet Adult; Regular Diet Adult; 2 gm NA Diet    Relevant Labs:  Recent Labs   Lab Test 04/13/23  0314 04/11/23  0350 04/10/23  1327   INR  --   --  1.71*   PLT 85*   < > 76*   PTT  --   --  61*   BUN 35.0*   < > 24.7*    < > = values in this interval not displayed.       Physical Exam:  Vitals: BP 90/57   Pulse 75   Temp 98.5  F (36.9  C) (Oral)   Resp 17   Ht 1.448 m (4' 9\")   Wt 50.5 kg (111 lb 5.3 oz)   SpO2 94%   BMI 24.09 kg/m      Physical Exam:   Orientation:  Alert, oriented, and in no acute distress: Yes  Sedation: No    Motor Examination:  5/5 Strength in lower extremities: Yes    Catheter Site:   Catheter entry site is clean/dry/intact: Yes    Tender: No      Relevant Medications:  Current Pain Medications:  Medications related to Pain Management (From now, onward)    Start     Dose/Rate Route Frequency Ordered Stop    04/13/23 0000  acetaminophen (TYLENOL) tablet 650 mg         650 mg Oral EVERY 4 HOURS PRN 04/10/23 1309      04/11/23 0800  polyethylene glycol (MIRALAX) Packet 17 g         17 g Oral DAILY 04/10/23 1309      04/11/23 0800  aspirin (ASA) chewable tablet 81 mg         81 mg Oral or NG Tube DAILY 04/10/23 1309      04/10/23 2000  senna-docusate (SENOKOT-S/PERICOLACE) 8.6-50 MG per tablet 1 tablet         1 tablet Oral 2 TIMES DAILY 04/10/23 1309      04/10/23 1309  magnesium hydroxide (MILK OF MAGNESIA) suspension 30 mL         30 mL Oral DAILY PRN 04/10/23 1309      04/10/23 1309  bisacodyl (DULCOLAX) suppository 10 mg         10 mg Rectal DAILY PRN 04/10/23 1309      04/10/23 1309  HYDROmorphone (DILAUDID) injection 0.2 mg        See Hyperspace for full Linked Orders Report.    0.2 mg " "Intravenous EVERY 2 HOURS PRN 04/10/23 1309      04/10/23 1309  HYDROmorphone (DILAUDID) injection 0.4 mg        See Hyperspace for full Linked Orders Report.    0.4 mg Intravenous EVERY 2 HOURS PRN 04/10/23 1309      04/10/23 1309  oxyCODONE (ROXICODONE) tablet 5 mg        See Hyperspace for full Linked Orders Report.    5 mg Oral EVERY 4 HOURS PRN 04/10/23 1309      04/10/23 1309  oxyCODONE IR (ROXICODONE) tablet 10 mg        See Hyperspace for full Linked Orders Report.    10 mg Oral EVERY 4 HOURS PRN 04/10/23 1309      04/10/23 1309  lidocaine 1 % 0.1-1 mL         0.1-1 mL Other EVERY 1 HOUR PRN 04/10/23 1309      04/10/23 1309  lidocaine (LMX4) cream          Topical EVERY 1 HOUR PRN 04/10/23 1309      04/10/23 1030  ropivacaine 0.2% in NS perineural infusion (simple)          Perineural Continuous Nerve Block 04/10/23 1023            Primary Service Contacted with Recommendations? No      Please see A&P for additional details of medical decision making.      Acute Inpatient Pain Service [facility]  Hours of pain coverage 24/7   Page via Amcom- Please Page the Pain Team Via Amcom: \"PAIN MANAGEMENT ACUTE INPATIENT/ [facility] [place]\"             "

## 2023-04-14 ENCOUNTER — APPOINTMENT (OUTPATIENT)
Dept: PHYSICAL THERAPY | Facility: CLINIC | Age: 63
DRG: 220 | End: 2023-04-14
Attending: THORACIC SURGERY (CARDIOTHORACIC VASCULAR SURGERY)
Payer: COMMERCIAL

## 2023-04-14 ENCOUNTER — APPOINTMENT (OUTPATIENT)
Dept: GENERAL RADIOLOGY | Facility: CLINIC | Age: 63
DRG: 220 | End: 2023-04-14
Attending: THORACIC SURGERY (CARDIOTHORACIC VASCULAR SURGERY)
Payer: COMMERCIAL

## 2023-04-14 ENCOUNTER — APPOINTMENT (OUTPATIENT)
Dept: OCCUPATIONAL THERAPY | Facility: CLINIC | Age: 63
DRG: 220 | End: 2023-04-14
Attending: THORACIC SURGERY (CARDIOTHORACIC VASCULAR SURGERY)
Payer: COMMERCIAL

## 2023-04-14 LAB
ALBUMIN SERPL BCG-MCNC: 3.4 G/DL (ref 3.5–5.2)
ALP SERPL-CCNC: 95 U/L (ref 35–129)
ALT SERPL W P-5'-P-CCNC: 28 U/L (ref 10–50)
ANION GAP SERPL CALCULATED.3IONS-SCNC: 9 MMOL/L (ref 7–15)
AST SERPL W P-5'-P-CCNC: 36 U/L (ref 10–50)
BILIRUB SERPL-MCNC: 1.4 MG/DL
BUN SERPL-MCNC: 25.5 MG/DL (ref 8–23)
CA-I BLD-MCNC: 4.6 MG/DL (ref 4.4–5.2)
CALCIUM SERPL-MCNC: 8.9 MG/DL (ref 8.8–10.2)
CHLORIDE SERPL-SCNC: 95 MMOL/L (ref 98–107)
CREAT SERPL-MCNC: 0.81 MG/DL (ref 0.51–1.17)
DEPRECATED HCO3 PLAS-SCNC: 31 MMOL/L (ref 22–29)
ERYTHROCYTE [DISTWIDTH] IN BLOOD BY AUTOMATED COUNT: 13.7 % (ref 10–15)
GFR SERPL CREATININE-BSD FRML MDRD: 82 ML/MIN/1.73M2
GLUCOSE BLDC GLUCOMTR-MCNC: 105 MG/DL (ref 70–99)
GLUCOSE BLDC GLUCOMTR-MCNC: 111 MG/DL (ref 70–99)
GLUCOSE BLDC GLUCOMTR-MCNC: 123 MG/DL (ref 70–99)
GLUCOSE BLDC GLUCOMTR-MCNC: 160 MG/DL (ref 70–99)
GLUCOSE BLDC GLUCOMTR-MCNC: 174 MG/DL (ref 70–99)
GLUCOSE BLDC GLUCOMTR-MCNC: 96 MG/DL (ref 70–99)
GLUCOSE SERPL-MCNC: 115 MG/DL (ref 70–99)
HCT VFR BLD AUTO: 29.7 % (ref 35–53)
HGB BLD-MCNC: 10 G/DL (ref 11.7–17.7)
MAGNESIUM SERPL-MCNC: 1.8 MG/DL (ref 1.7–2.3)
MCH RBC QN AUTO: 35.8 PG (ref 26.5–33)
MCHC RBC AUTO-ENTMCNC: 33.7 G/DL (ref 31.5–36.5)
MCV RBC AUTO: 107 FL (ref 78–100)
PHOSPHATE SERPL-MCNC: 1.9 MG/DL (ref 2.5–4.5)
PHOSPHATE SERPL-MCNC: 2.6 MG/DL (ref 2.5–4.5)
PLATELET # BLD AUTO: 82 10E3/UL (ref 150–450)
POTASSIUM SERPL-SCNC: 3.3 MMOL/L (ref 3.4–5.3)
POTASSIUM SERPL-SCNC: 4 MMOL/L (ref 3.4–5.3)
PROT SERPL-MCNC: 5.9 G/DL (ref 6.4–8.3)
RBC # BLD AUTO: 2.79 10E6/UL (ref 3.8–5.9)
SODIUM SERPL-SCNC: 135 MMOL/L (ref 136–145)
WBC # BLD AUTO: 7 10E3/UL (ref 4–11)

## 2023-04-14 PROCEDURE — 97530 THERAPEUTIC ACTIVITIES: CPT | Mod: GP

## 2023-04-14 PROCEDURE — 250N000013 HC RX MED GY IP 250 OP 250 PS 637: Performed by: THORACIC SURGERY (CARDIOTHORACIC VASCULAR SURGERY)

## 2023-04-14 PROCEDURE — 99207 PR NO BILLABLE SERVICE THIS VISIT: CPT | Performed by: STUDENT IN AN ORGANIZED HEALTH CARE EDUCATION/TRAINING PROGRAM

## 2023-04-14 PROCEDURE — 250N000012 HC RX MED GY IP 250 OP 636 PS 637: Performed by: THORACIC SURGERY (CARDIOTHORACIC VASCULAR SURGERY)

## 2023-04-14 PROCEDURE — 250N000011 HC RX IP 250 OP 636

## 2023-04-14 PROCEDURE — 999N000155 HC STATISTIC RAPCV CVP MONITORING

## 2023-04-14 PROCEDURE — 84132 ASSAY OF SERUM POTASSIUM: CPT | Performed by: THORACIC SURGERY (CARDIOTHORACIC VASCULAR SURGERY)

## 2023-04-14 PROCEDURE — 80053 COMPREHEN METABOLIC PANEL: CPT | Performed by: STUDENT IN AN ORGANIZED HEALTH CARE EDUCATION/TRAINING PROGRAM

## 2023-04-14 PROCEDURE — 250N000013 HC RX MED GY IP 250 OP 250 PS 637: Performed by: STUDENT IN AN ORGANIZED HEALTH CARE EDUCATION/TRAINING PROGRAM

## 2023-04-14 PROCEDURE — 271N000002 HC RX 271

## 2023-04-14 PROCEDURE — 85027 COMPLETE CBC AUTOMATED: CPT | Performed by: STUDENT IN AN ORGANIZED HEALTH CARE EDUCATION/TRAINING PROGRAM

## 2023-04-14 PROCEDURE — 250N000011 HC RX IP 250 OP 636: Performed by: PHYSICIAN ASSISTANT

## 2023-04-14 PROCEDURE — 200N000002 HC R&B ICU UMMC

## 2023-04-14 PROCEDURE — 84100 ASSAY OF PHOSPHORUS: CPT | Performed by: STUDENT IN AN ORGANIZED HEALTH CARE EDUCATION/TRAINING PROGRAM

## 2023-04-14 PROCEDURE — 82330 ASSAY OF CALCIUM: CPT | Performed by: PHYSICIAN ASSISTANT

## 2023-04-14 PROCEDURE — 36415 COLL VENOUS BLD VENIPUNCTURE: CPT | Performed by: THORACIC SURGERY (CARDIOTHORACIC VASCULAR SURGERY)

## 2023-04-14 PROCEDURE — 250N000013 HC RX MED GY IP 250 OP 250 PS 637

## 2023-04-14 PROCEDURE — 71045 X-RAY EXAM CHEST 1 VIEW: CPT | Mod: 26 | Performed by: RADIOLOGY

## 2023-04-14 PROCEDURE — 250N000013 HC RX MED GY IP 250 OP 250 PS 637: Performed by: PHYSICIAN ASSISTANT

## 2023-04-14 PROCEDURE — 99232 SBSQ HOSP IP/OBS MODERATE 35: CPT | Mod: 24 | Performed by: STUDENT IN AN ORGANIZED HEALTH CARE EDUCATION/TRAINING PROGRAM

## 2023-04-14 PROCEDURE — 97535 SELF CARE MNGMENT TRAINING: CPT | Mod: GO

## 2023-04-14 PROCEDURE — 71045 X-RAY EXAM CHEST 1 VIEW: CPT

## 2023-04-14 PROCEDURE — 83735 ASSAY OF MAGNESIUM: CPT | Performed by: STUDENT IN AN ORGANIZED HEALTH CARE EDUCATION/TRAINING PROGRAM

## 2023-04-14 PROCEDURE — 97116 GAIT TRAINING THERAPY: CPT | Mod: GP

## 2023-04-14 PROCEDURE — 84100 ASSAY OF PHOSPHORUS: CPT | Performed by: THORACIC SURGERY (CARDIOTHORACIC VASCULAR SURGERY)

## 2023-04-14 RX ORDER — DILTIAZEM HYDROCHLORIDE 30 MG/1
30 TABLET, FILM COATED ORAL EVERY 6 HOURS SCHEDULED
Status: COMPLETED | OUTPATIENT
Start: 2023-04-14 | End: 2023-04-16

## 2023-04-14 RX ORDER — POTASSIUM CHLORIDE 750 MG/1
20 TABLET, EXTENDED RELEASE ORAL ONCE
Status: COMPLETED | OUTPATIENT
Start: 2023-04-14 | End: 2023-04-14

## 2023-04-14 RX ORDER — POTASSIUM CHLORIDE 750 MG/1
20 TABLET, EXTENDED RELEASE ORAL 2 TIMES DAILY
Status: DISCONTINUED | OUTPATIENT
Start: 2023-04-14 | End: 2023-04-16

## 2023-04-14 RX ORDER — POTASSIUM CHLORIDE 7.45 MG/ML
10 INJECTION INTRAVENOUS
Status: DISCONTINUED | OUTPATIENT
Start: 2023-04-14 | End: 2023-04-14

## 2023-04-14 RX ADMIN — MUPIROCIN 0.5 G: 20 OINTMENT TOPICAL at 19:57

## 2023-04-14 RX ADMIN — METHOCARBAMOL 500 MG: 500 TABLET ORAL at 19:56

## 2023-04-14 RX ADMIN — POTASSIUM CHLORIDE 20 MEQ: 750 TABLET, EXTENDED RELEASE ORAL at 06:30

## 2023-04-14 RX ADMIN — HEPARIN SODIUM 5000 UNITS: 5000 INJECTION, SOLUTION INTRAVENOUS; SUBCUTANEOUS at 12:13

## 2023-04-14 RX ADMIN — Medication 3 MG: at 08:46

## 2023-04-14 RX ADMIN — GABAPENTIN 100 MG: 100 CAPSULE ORAL at 19:56

## 2023-04-14 RX ADMIN — POTASSIUM CHLORIDE 20 MEQ: 750 TABLET, EXTENDED RELEASE ORAL at 17:04

## 2023-04-14 RX ADMIN — GABAPENTIN 100 MG: 100 CAPSULE ORAL at 14:17

## 2023-04-14 RX ADMIN — POTASSIUM CHLORIDE 20 MEQ: 750 TABLET, EXTENDED RELEASE ORAL at 19:57

## 2023-04-14 RX ADMIN — Medication 10 MG: at 21:20

## 2023-04-14 RX ADMIN — POTASSIUM & SODIUM PHOSPHATES POWDER PACK 280-160-250 MG 2 PACKET: 280-160-250 PACK at 10:28

## 2023-04-14 RX ADMIN — ACETAMINOPHEN 650 MG: 325 TABLET ORAL at 21:20

## 2023-04-14 RX ADMIN — GABAPENTIN 100 MG: 100 CAPSULE ORAL at 08:46

## 2023-04-14 RX ADMIN — POTASSIUM CHLORIDE 20 MEQ: 1.5 POWDER, FOR SOLUTION ORAL at 08:47

## 2023-04-14 RX ADMIN — PANTOPRAZOLE SODIUM 40 MG: 40 TABLET, DELAYED RELEASE ORAL at 08:46

## 2023-04-14 RX ADMIN — METHOCARBAMOL 500 MG: 500 TABLET ORAL at 08:46

## 2023-04-14 RX ADMIN — ASPIRIN 81 MG 81 MG: 81 TABLET ORAL at 08:46

## 2023-04-14 RX ADMIN — DILTIAZEM HYDROCHLORIDE 30 MG: 30 TABLET, FILM COATED ORAL at 12:13

## 2023-04-14 RX ADMIN — HYDROXYCHLOROQUINE SULFATE 200 MG: 200 TABLET, FILM COATED ORAL at 19:57

## 2023-04-14 RX ADMIN — Medication 500 MG: at 23:37

## 2023-04-14 RX ADMIN — OXYCODONE HYDROCHLORIDE 5 MG: 5 TABLET ORAL at 21:19

## 2023-04-14 RX ADMIN — METHOCARBAMOL 500 MG: 500 TABLET ORAL at 17:04

## 2023-04-14 RX ADMIN — POTASSIUM & SODIUM PHOSPHATES POWDER PACK 280-160-250 MG 2 PACKET: 280-160-250 PACK at 14:17

## 2023-04-14 RX ADMIN — METHOCARBAMOL 500 MG: 500 TABLET ORAL at 12:13

## 2023-04-14 RX ADMIN — POTASSIUM & SODIUM PHOSPHATES POWDER PACK 280-160-250 MG 2 PACKET: 280-160-250 PACK at 06:30

## 2023-04-14 RX ADMIN — OXYCODONE HYDROCHLORIDE 5 MG: 5 TABLET ORAL at 14:16

## 2023-04-14 RX ADMIN — MUPIROCIN 0.5 G: 20 OINTMENT TOPICAL at 10:29

## 2023-04-14 RX ADMIN — HEPARIN SODIUM 5000 UNITS: 5000 INJECTION, SOLUTION INTRAVENOUS; SUBCUTANEOUS at 19:57

## 2023-04-14 RX ADMIN — HEPARIN SODIUM 5000 UNITS: 5000 INJECTION, SOLUTION INTRAVENOUS; SUBCUTANEOUS at 04:26

## 2023-04-14 ASSESSMENT — ACTIVITIES OF DAILY LIVING (ADL)
ADLS_ACUITY_SCORE: 31
ADLS_ACUITY_SCORE: 28
ADLS_ACUITY_SCORE: 31
ADLS_ACUITY_SCORE: 27
ADLS_ACUITY_SCORE: 28
ADLS_ACUITY_SCORE: 31

## 2023-04-14 NOTE — PLAN OF CARE
Goal Outcome Evaluation:  Major Shift Events:   N: Alert and oriented. Pain controlled with ropivacaine. Afebrile   CV: Paced on demand, Norepi gtt stopped 0230. Map >65. Minimal CT output   Pulm: RA overnight, satting >95%  GI/: Purwick in place, adequate UOP. One loose bm on shift   Plan: maintain maps >65 off norepi   For vital signs and complete assessments, please see documentation flowsheets.

## 2023-04-14 NOTE — PROGRESS NOTES
CV ICU PROGRESS NOTE  Amelia Michel  [id number]  Admitted: 4/10/2023  5:08 AM      ASSESSMENT  Amelia Michel is a 62 year old adult with PMH of cardiac arrest (2017) NSVT s/p PVC ablation (11/22), HFpEF, mildly reduced RV function, afib/aflutter, CKD III, severe tricuspid regurgitation. Now POD#2 s/p mini thoracotomy and TVR (Porcine 29 mm valve) with Dr. Cope 4/10/23.      Updates/Changes Today:  - Continue to hold PTA diuretics -- on RA, net negative volume  - Resume diltiazem at decreased PTA dosing.  - Transfer to floor.     PLAN:  Neuro/ pain/ sedation:  # Acute post-operative pain  - Scheduled: Tylenol 975 Q8H, Gabapentin 100 mg TID, Robaxin 500 mg Q6H  - PRN: Tylenol, Dilaudid, Oxycodone  - Regional anesthesia: Right sided COOPER catheter     Pulmonary:    # Acute post-operative respiratory insufficiency  - Supplemental oxygen, wean with goal SpO2 > 92%  - Encourage IS and flutter valve Q10-15 minutes once extubated      Cardiovascular:    # Hypertension   # Hx VSD s/p repair (1969)  # Hx of Vfib cardiac arrest (2017)  # Chronic diastolic heart failure NYHA Class III  # HFpEF 55-60%   # Mildly reduced RV function  # Severe tricuspid regurgitation s/p TVR   # Cardiogenic shock  - Goal MAP >65, SBP <140, monitor hemodynamics  - Diuresis: restart PTA metolazone, torsemide   - Plan to continue to hold torsemide 20 mg and metolazone given net negative volume status.  - Hold PTA spironolactone, empagliflozin  - ASA: 82 mg   - 4/14: resume PTA dilt -- was taking 240 ER, will do 30 mg Q6H with nursing hold parameters.     # Hx of Vfib cardiac arrest (2017)  # SSS s/p PPM (2019)  # Frequent, NSVT s/p VT ablation (11/30/2022)  # Hx of afib/aflutter   Dual chamber PPM (2019), Inherent Pacing mode DDDR  - Device check 4/11 VVIR changed to DDDR  BPM  - [id number] 2 on PTA abixaban  - Hold PTA apixaban     GI/Nutrition:   - Regular diet  - PPI prophylaxis     Fluids/ Electrolytes/Renal:   # Chronic kidney  disease, III  # Chronic hyponatremia   Baseline Cr appears to be ~1.0-1.2. Baseline Na 127-132  - Strict I/O, daily weights, avoid/limit nephrotoxins  - Replete lytes PRN per protocol  - Potassium po 20 mEq BID  - Goal net even, will monitor off of diuretics today.      Endocrine:    # Stress hyperglycemia  Pre op HgA1c 5.9  - MDSSI  - Goal BG <180 for optimal healing      ID/ Antibiotics:  - Monitor fever curve, WBC, and inflammatory markers as appropriate     Heme:   # Hx DLBCL s/p CHOP in remission     # Acute blood loss anemia  # Post-CPB  thrombocytopenia  *Hx hives after plt transfusion. Plan pre-treat diphenhydramine if needs transfusion  No s/sx active bleeding  Postoperative thrombocytopenia stable >70  - Daily CBC   - Hgb goal > 7.0     MSK / Skin  # Rheumatoid arthritis  # Sternotomy  # Surgical Incision  Sternal precautions  - Postoperative incision management per protocol  - PT/OT/CR  - Continue PTA prednisone 3 mg daily  - Continue PTA Plaquenil 200 mg at bedtime      Prophylaxis:    - Mechanical DVT ppx  - Chemical DVT ppx: SQH  - PPI     Lines/ tubes/ drains:  - RIJ CVC  - CT x3 (1 R pleural, 2 mediastinal)  - Epicardial V pacing wire x1  - R COOPER catheter      Disposition:  - CV ICU.      Patient seen, findings and plan discussed with CVICU staff Dr. Adam.    Jina Cooley, APRN CNP  ASCOM [id number]  CC time 35 minutes  ====================================    TODAY'S PROGRESS  SUBJECTIVE  NAEO. Nursing notes reviewed. Reports good pain control, some weakness with mobility. Diarrhea better today per patient report.     OBJECTIVE  1. VITAL SIGNS  Temp:  [98  F (36.7  C)-99.6  F (37.6  C)] 98.7  F (37.1  C)  Pulse:  [] 86  Resp:  [10-46] 13  BP: ()/(50-72) 93/55  SpO2:  [93 %-100 %] 98 %  Resp: 13      2. INTAKE/ OUTPUT  I/O last 3 completed shifts:  In: 308.62 [P.O.:160; I.V.:148.62]  Out: 1680 [Urine:1590; Chest Tube:90]    3. PHYSICAL EXAMINATION  General: comfortable appearing, laying  in bed, pleasant  HEENT: anicteric sclera, membranes moist  CV: normal capillary refill, AV paced 75 BPM  Respiratory: normal work of breathing, on room air. lung sounds clear bilaterally  Abdominal: soft, non-distended  Musculoskeletal: normal bulk and tone, petite  Skin: healed previous sternotomy. incision site c/d/i. CT to suction, +airleak, serosanguinous output   Neurologic: normal speech, alert and oriented x3  Psychiatric: appropriate      4. INVESTIGATIONS  Arterial Blood Gases   Recent Labs   Lab 04/11/23  1500 04/11/23  1304 04/11/23  1133 04/11/23  0350   PH 7.43 7.55* 7.47* 7.46*   PCO2 37 28* 36 37   PO2 106* 169* 150* 183*   HCO3 25 24 26 26     Complete Blood Count   Recent Labs   Lab 04/14/23  0421 04/13/23  0314 04/12/23  0355 04/11/23  0350   WBC 7.0 9.4 11.6* 8.9   HGB 10.0* 11.2* 11.2* 11.6*   PLT 82* 85* 76* 73*     Basic Metabolic Panel  Recent Labs   Lab 04/14/23  0421 04/14/23  0420 04/14/23  0128 04/13/23  2154 04/13/23  1403 04/13/23  1101 04/13/23  0753 04/13/23  0314 04/12/23 2129 04/12/23  2102 04/12/23  0358 04/12/23  0355 04/11/23  0752 04/11/23  0350   *  --   --   --   --   --   --  138  --   --   --  134*  --  136   POTASSIUM 3.3*  --   --   --   --  3.6  --  2.6*  2.6*  --  2.7*   < > 3.9   < > 3.9   CHLORIDE 95*  --   --   --   --   --   --  94*  --   --   --  97*  --  101   CO2 31*  --   --   --   --   --   --  31*  --   --   --  24  --  20*   BUN 25.5*  --   --   --   --   --   --  35.0*  --   --   --  48.1*  --  31.2*   CR 0.81  --   --   --   --   --   --  0.91  --   --   --  1.45*  --  1.11   * 111* 123* 112*   < >  --    < > 88   < >  --    < > 99   < > 145*    < > = values in this interval not displayed.     Liver Function Tests  Recent Labs   Lab 04/14/23  0421 04/13/23  0314 04/12/23  0355 04/11/23  0350 04/10/23  1327 04/10/23  1327 04/10/23  1210 04/10/23  0604   AST 36 55* 89* 113*   < > 105*  --   --    ALT 28 39 53* 53*   < > 42  --   --    ALKPHOS 95  89 85 73   < > 83  --   --    BILITOTAL 1.4* 1.7* 1.3* 1.5*   < > 1.9*  --   --    ALBUMIN 3.4* 3.6 3.9 3.2*   < > 3.4*  --   --    INR  --   --   --   --   --  1.71* 1.76* 1.20*    < > = values in this interval not displayed.     Pancreatic Enzymes  No lab results found in last 7 days.  Coagulation Profile  Recent Labs   Lab 04/10/23  1327 04/10/23  1210 04/10/23  0604   INR 1.71* 1.76* 1.20*   PTT 61* 35 29     Lactate  Invalid input(s): LACTATE    5. RADIOLOGY  Recent Results (from the past 24 hour(s))   XR Chest Port 1 View    Narrative    Examination: XR CHEST PORT 1 VIEW 4/10/2023 2:50 PM    Indication: Post Op CVTS Surgery    Comparison: X-ray 3/17/2023    Findings:  AP supine portable chest. Postoperative changes of tricuspid valve  repair. Median sternotomy wires, and mild perihilar haziness with mild  basilar atelectasis. Stable cardiomegaly. Right basilar chest tube. No  appreciable pneumothorax. Mediastinal surgical drains. Enteric tube  terminates over the stomach. No significant pleural effusions.  Endotracheal tube projects over the midthoracic trachea. Left  subclavian transvenous approach pacer device with leads and overlying  soft tissue position. No focal consolidation. Unremarkable limited  upper abdomen radiographically. Stable mild convex leftward  thoracolumbar curve. Degenerative spurring throughout the visualized  vertebral column.      Impression    Impression:   Stable cardiomegaly with postsurgical changes tricuspid valve repair.  Mild postsurgical perihilar edema and scattered atelectasis. Continued  follow-up to exclude infectious component.    I have personally reviewed the examination and initial interpretation  and I agree with the findings.    JUVENAL GALDAMEZ MD         SYSTEM ID:  [id number]   XR Chest Port 1 View    Narrative    EXAM: XR CHEST PORT 1 VIEW  4/11/2023 1:00 AM     HISTORY:  chest tubes s/p tricuspid valve replacement       COMPARISON:  Chest x-ray  4/10/2023    FINDINGS:   AP view of the chest. Postsurgical changes of tricuspid valve  replacement. Intact sternotomy wires. Endotracheal tube tip is  approximately 4.2 cm above the ermelinda. Enteric tube sidehole overlies  the stomach; the tip is out of the field-of-view. Left chest wall  pacemaker with leads in stable position. Right IJ central venous  catheter tip overlies the high SVC. Stable right chest tubes and  mediastinal drains.    Stable enlarged heart size. Stable perihilar opacities. No significant  pleural effusion. No pneumothorax. Subcutaneous emphysema in the right  supraclavicular region.      Impression    IMPRESSION:   1. Stable mild perihilar atelectasis/edema.  2. Stable support devices.    I have personally reviewed the examination and initial interpretation  and I agree with the findings.    RAMONA COELLO MD         SYSTEM ID:  [id number]

## 2023-04-14 NOTE — PLAN OF CARE
ANTICOAGULATION  MANAGEMENT-Home Monitor Managed by Exception    Haresh Rock 74 year old male is on warfarin with therapeutic INR result. (Goal INR 2.0-3.0)    Recent labs: (last 7 days)     04/14/23  1040   INR 3.0*         Previous INR was Therapeutic    Medication, diet, health changes since last INR:chart reviewed; none identified    Contacted within the last 12 weeks by phone on 04/07/2023      MINNIE Hutson was NOT contacted regarding therapeutic result today per home monitoring policy manage by exception agreement.   Current warfarin dose is to be continued:     Summary  As of 4/14/2023    Full warfarin instructions:  2.5 mg every Tue, Fri; 3.75 mg all other days   Next INR check:  4/21/2023           ?   Hari Saavedra RN  [facility]  4/14/2023    _______________________________________________________________________     Anticoagulation Episode Summary     Current INR goal:  2.0-3.0   TTR:  86.9 % (1 y)   Target end date:  Indefinite   Send INR reminders to:  [facility]    Indications    Long-term (current) use of anticoagulants [Z79.01] [Z79.01]  Atrial fibrillation (H) [I48.91]  Antiphospholipid antibody syndrome (H) (Resolved) [D68.61]  Personal history of DVT (deep vein thrombosis) (Resolved) [Z86.718]  Atrial fibrillation  unspecified type (H) (Resolved) [I48.91]  Paroxysmal atrial fibrillation (H) [I48.0]  Chronic atrial fibrillation (H) [I48.20]  Personal history of PE (pulmonary embolism) [Z86.711]           Comments:  JESSICA rodas to manage by exception         Anticoagulation Care Providers     Provider Role Specialty Phone number    Anya Nowak MD Referring Family Medicine [phone]             Problem: Goal/Outcome  Goal: Goal Outcome Summary  Outcome: No Change  FOCUS/GOAL  Bowel management, Bladder management and Mobility     ASSESSMENT, INTERVENTIONS AND CONTINUING PLAN FOR GOAL:  Pt has been sleeping well, and does use her call light for needs. Pt was continent and incontinent of BM. Pt has patent brand catheter which is draining well. Pt is able to reposition with min assist of one. No c/o pain offered.

## 2023-04-15 ENCOUNTER — APPOINTMENT (OUTPATIENT)
Dept: GENERAL RADIOLOGY | Facility: CLINIC | Age: 63
DRG: 220 | End: 2023-04-15
Attending: THORACIC SURGERY (CARDIOTHORACIC VASCULAR SURGERY)
Payer: COMMERCIAL

## 2023-04-15 LAB
ALBUMIN SERPL BCG-MCNC: 3.2 G/DL (ref 3.5–5.2)
ALP SERPL-CCNC: 100 U/L (ref 35–129)
ALT SERPL W P-5'-P-CCNC: 27 U/L (ref 10–50)
ANION GAP SERPL CALCULATED.3IONS-SCNC: 10 MMOL/L (ref 7–15)
AST SERPL W P-5'-P-CCNC: 33 U/L (ref 10–50)
BILIRUB SERPL-MCNC: 1.2 MG/DL
BUN SERPL-MCNC: 22.1 MG/DL (ref 8–23)
CA-I BLD-MCNC: 4.4 MG/DL (ref 4.4–5.2)
CALCIUM SERPL-MCNC: 8.4 MG/DL (ref 8.8–10.2)
CHLORIDE SERPL-SCNC: 95 MMOL/L (ref 98–107)
CREAT SERPL-MCNC: 0.7 MG/DL (ref 0.51–1.17)
DEPRECATED HCO3 PLAS-SCNC: 26 MMOL/L (ref 22–29)
ERYTHROCYTE [DISTWIDTH] IN BLOOD BY AUTOMATED COUNT: 14.1 % (ref 10–15)
GFR SERPL CREATININE-BSD FRML MDRD: >90 ML/MIN/1.73M2
GLUCOSE BLDC GLUCOMTR-MCNC: 102 MG/DL (ref 70–99)
GLUCOSE BLDC GLUCOMTR-MCNC: 116 MG/DL (ref 70–99)
GLUCOSE BLDC GLUCOMTR-MCNC: 118 MG/DL (ref 70–99)
GLUCOSE BLDC GLUCOMTR-MCNC: 120 MG/DL (ref 70–99)
GLUCOSE BLDC GLUCOMTR-MCNC: 149 MG/DL (ref 70–99)
GLUCOSE SERPL-MCNC: 103 MG/DL (ref 70–99)
HCT VFR BLD AUTO: 28 % (ref 35–53)
HGB BLD-MCNC: 9.4 G/DL (ref 11.7–17.7)
MAGNESIUM SERPL-MCNC: 2 MG/DL (ref 1.7–2.3)
MCH RBC QN AUTO: 36.2 PG (ref 26.5–33)
MCHC RBC AUTO-ENTMCNC: 33.6 G/DL (ref 31.5–36.5)
MCV RBC AUTO: 108 FL (ref 78–100)
PHOSPHATE SERPL-MCNC: 2.2 MG/DL (ref 2.5–4.5)
PLATELET # BLD AUTO: 90 10E3/UL (ref 150–450)
POTASSIUM SERPL-SCNC: 3.7 MMOL/L (ref 3.4–5.3)
PROT SERPL-MCNC: 5.8 G/DL (ref 6.4–8.3)
RBC # BLD AUTO: 2.6 10E6/UL (ref 3.8–5.9)
SODIUM SERPL-SCNC: 131 MMOL/L (ref 136–145)
WBC # BLD AUTO: 7.2 10E3/UL (ref 4–11)

## 2023-04-15 PROCEDURE — 250N000013 HC RX MED GY IP 250 OP 250 PS 637: Performed by: PHYSICIAN ASSISTANT

## 2023-04-15 PROCEDURE — 250N000013 HC RX MED GY IP 250 OP 250 PS 637: Performed by: STUDENT IN AN ORGANIZED HEALTH CARE EDUCATION/TRAINING PROGRAM

## 2023-04-15 PROCEDURE — 250N000012 HC RX MED GY IP 250 OP 636 PS 637: Performed by: THORACIC SURGERY (CARDIOTHORACIC VASCULAR SURGERY)

## 2023-04-15 PROCEDURE — 82330 ASSAY OF CALCIUM: CPT | Performed by: PHYSICIAN ASSISTANT

## 2023-04-15 PROCEDURE — 250N000011 HC RX IP 250 OP 636: Performed by: PHYSICIAN ASSISTANT

## 2023-04-15 PROCEDURE — 71045 X-RAY EXAM CHEST 1 VIEW: CPT | Mod: 26 | Performed by: RADIOLOGY

## 2023-04-15 PROCEDURE — 99232 SBSQ HOSP IP/OBS MODERATE 35: CPT | Mod: 24 | Performed by: STUDENT IN AN ORGANIZED HEALTH CARE EDUCATION/TRAINING PROGRAM

## 2023-04-15 PROCEDURE — 80053 COMPREHEN METABOLIC PANEL: CPT | Performed by: STUDENT IN AN ORGANIZED HEALTH CARE EDUCATION/TRAINING PROGRAM

## 2023-04-15 PROCEDURE — 250N000013 HC RX MED GY IP 250 OP 250 PS 637

## 2023-04-15 PROCEDURE — 120N000002 HC R&B MED SURG/OB UMMC

## 2023-04-15 PROCEDURE — 85027 COMPLETE CBC AUTOMATED: CPT | Performed by: STUDENT IN AN ORGANIZED HEALTH CARE EDUCATION/TRAINING PROGRAM

## 2023-04-15 PROCEDURE — 36415 COLL VENOUS BLD VENIPUNCTURE: CPT | Performed by: PHYSICIAN ASSISTANT

## 2023-04-15 PROCEDURE — 71045 X-RAY EXAM CHEST 1 VIEW: CPT

## 2023-04-15 PROCEDURE — 250N000013 HC RX MED GY IP 250 OP 250 PS 637: Performed by: THORACIC SURGERY (CARDIOTHORACIC VASCULAR SURGERY)

## 2023-04-15 PROCEDURE — 250N000011 HC RX IP 250 OP 636: Performed by: THORACIC SURGERY (CARDIOTHORACIC VASCULAR SURGERY)

## 2023-04-15 PROCEDURE — 83735 ASSAY OF MAGNESIUM: CPT | Performed by: STUDENT IN AN ORGANIZED HEALTH CARE EDUCATION/TRAINING PROGRAM

## 2023-04-15 PROCEDURE — 84100 ASSAY OF PHOSPHORUS: CPT | Performed by: STUDENT IN AN ORGANIZED HEALTH CARE EDUCATION/TRAINING PROGRAM

## 2023-04-15 RX ORDER — MAGNESIUM OXIDE 400 MG/1
400 TABLET ORAL EVERY 4 HOURS
Status: COMPLETED | OUTPATIENT
Start: 2023-04-15 | End: 2023-04-15

## 2023-04-15 RX ORDER — MAGNESIUM SULFATE HEPTAHYDRATE 40 MG/ML
2 INJECTION, SOLUTION INTRAVENOUS ONCE
Status: COMPLETED | OUTPATIENT
Start: 2023-04-15 | End: 2023-04-15

## 2023-04-15 RX ORDER — POTASSIUM CHLORIDE 750 MG/1
10 TABLET, EXTENDED RELEASE ORAL ONCE
Status: COMPLETED | OUTPATIENT
Start: 2023-04-15 | End: 2023-04-15

## 2023-04-15 RX ADMIN — DILTIAZEM HYDROCHLORIDE 30 MG: 30 TABLET, FILM COATED ORAL at 05:39

## 2023-04-15 RX ADMIN — METHOCARBAMOL 500 MG: 500 TABLET ORAL at 17:50

## 2023-04-15 RX ADMIN — GABAPENTIN 100 MG: 100 CAPSULE ORAL at 20:25

## 2023-04-15 RX ADMIN — Medication 400 MG: at 05:35

## 2023-04-15 RX ADMIN — Medication 3 MG: at 08:57

## 2023-04-15 RX ADMIN — POTASSIUM & SODIUM PHOSPHATES POWDER PACK 280-160-250 MG 1 PACKET: 280-160-250 PACK at 08:57

## 2023-04-15 RX ADMIN — Medication 400 MG: at 08:57

## 2023-04-15 RX ADMIN — POTASSIUM CHLORIDE 20 MEQ: 750 TABLET, EXTENDED RELEASE ORAL at 20:24

## 2023-04-15 RX ADMIN — DILTIAZEM HYDROCHLORIDE 30 MG: 30 TABLET, FILM COATED ORAL at 17:51

## 2023-04-15 RX ADMIN — POTASSIUM CHLORIDE 10 MEQ: 750 TABLET, EXTENDED RELEASE ORAL at 04:55

## 2023-04-15 RX ADMIN — MUPIROCIN 0.5 G: 20 OINTMENT TOPICAL at 08:58

## 2023-04-15 RX ADMIN — METHOCARBAMOL 500 MG: 500 TABLET ORAL at 20:24

## 2023-04-15 RX ADMIN — ACETAMINOPHEN 650 MG: 325 TABLET ORAL at 22:07

## 2023-04-15 RX ADMIN — HYDROXYCHLOROQUINE SULFATE 200 MG: 200 TABLET, FILM COATED ORAL at 22:07

## 2023-04-15 RX ADMIN — ACETAMINOPHEN 650 MG: 325 TABLET ORAL at 11:04

## 2023-04-15 RX ADMIN — ASPIRIN 81 MG 81 MG: 81 TABLET ORAL at 08:57

## 2023-04-15 RX ADMIN — MAGNESIUM SULFATE IN WATER 2 G: 40 INJECTION, SOLUTION INTRAVENOUS at 04:54

## 2023-04-15 RX ADMIN — PANTOPRAZOLE SODIUM 40 MG: 40 TABLET, DELAYED RELEASE ORAL at 08:57

## 2023-04-15 RX ADMIN — METHOCARBAMOL 500 MG: 500 TABLET ORAL at 12:15

## 2023-04-15 RX ADMIN — POTASSIUM & SODIUM PHOSPHATES POWDER PACK 280-160-250 MG 1 PACKET: 280-160-250 PACK at 04:55

## 2023-04-15 RX ADMIN — HEPARIN SODIUM 5000 UNITS: 5000 INJECTION, SOLUTION INTRAVENOUS; SUBCUTANEOUS at 12:15

## 2023-04-15 RX ADMIN — HEPARIN SODIUM 5000 UNITS: 5000 INJECTION, SOLUTION INTRAVENOUS; SUBCUTANEOUS at 04:54

## 2023-04-15 RX ADMIN — Medication 10 MG: at 22:07

## 2023-04-15 RX ADMIN — GABAPENTIN 100 MG: 100 CAPSULE ORAL at 08:57

## 2023-04-15 RX ADMIN — HEPARIN SODIUM 5000 UNITS: 5000 INJECTION, SOLUTION INTRAVENOUS; SUBCUTANEOUS at 20:24

## 2023-04-15 RX ADMIN — POTASSIUM & SODIUM PHOSPHATES POWDER PACK 280-160-250 MG 1 PACKET: 280-160-250 PACK at 12:15

## 2023-04-15 RX ADMIN — POTASSIUM CHLORIDE 20 MEQ: 750 TABLET, EXTENDED RELEASE ORAL at 08:57

## 2023-04-15 RX ADMIN — METHOCARBAMOL 500 MG: 500 TABLET ORAL at 08:57

## 2023-04-15 ASSESSMENT — ACTIVITIES OF DAILY LIVING (ADL)
ADLS_ACUITY_SCORE: 27

## 2023-04-15 NOTE — PLAN OF CARE
Time: 0700 - 1930    Major Shift Events: VS remain stable on RA, MAP >65 with no intervention needed. PO diltiazem re-started. Ambulated in [place] x1 with PT. Good PO intake. Voiding via commode/purewick.     Plan: Monitor lytes/tele, encourage activity, PO intake, pulmonary hygiene. [place] [facility] [place]. Follow plan of care.    For vital signs and complete assessments, please see documentation flowsheets.     Goal Outcome Evaluation:      Plan of Care Reviewed With: patient, spouse    Overall Patient Progress: no change

## 2023-04-15 NOTE — PROGRESS NOTES
CV ICU PROGRESS NOTE  Amelia Michel  [id number]  Admitted: 4/10/2023  5:08 AM  Date of service: 4/15/23    ASSESSMENT: Amelia Michel is a 62 year old adult with PMH of cardiac arrest (2017) NSVT s/p PVC ablation (11/22), HFpEF, mildly reduced RV function, afib/aflutter, CKD III, severe tricuspid regurgitation. Now POD#2 s/p mini thoracotomy and TVR (Porcine 29 mm valve) with Dr. Cope 4/10/23.      Updates/Changes Today:  - Awaiting floor bed   - Remove pacer wires today per CVTS   - Split CT today   - Discuss when to resume Eliquis      PLAN:  Neuro/ pain/ sedation:  # Acute post-operative pain  - Scheduled: Tylenol 975 Q8H, Gabapentin 100 mg TID, Robaxin 500 mg Q6H  - PRN: Tylenol, Dilaudid, Oxycodone  - Regional anesthesia: Right sided COOPER catheter     Pulmonary:    # Acute post-operative respiratory insufficiency  - Supplemental oxygen, wean with goal SpO2 > 92%  - Encourage IS and flutter valve Q10-15 minutes once extubated      Cardiovascular:    # Hypertension   # Hx VSD s/p repair (1969)  # Hx of Vfib cardiac arrest (2017)  # Chronic diastolic heart failure NYHA Class III  # HFpEF 55-60%   # Mildly reduced RV function  # Severe tricuspid regurgitation s/p TVR   # Cardiogenic shock  - Goal MAP >65, SBP <140, monitor hemodynamics  - Diuresis: restart PTA metolazone, torsemide   - Plan to continue to hold torsemide 20 mg and metolazone given net negative volume status.  - Hold PTA spironolactone, empagliflozin  - ASA: 82 mg   - resume PTA dilt -- was taking 240 ER, will order 30 mg Q6H with nursing hold parameters.     # Hx of Vfib cardiac arrest (2017)  # SSS s/p PPM (2019)  # Frequent, NSVT s/p VT ablation (11/30/2022)  # Hx of afib/aflutter   Dual chamber PPM (2019), Inherent Pacing mode DDDR  - Device check 4/11 VVIR changed to DDDR  BPM  - [id number] 2 on PTA abixaban  - Hold PTA apixaban; Follow up when to resume 4/16      GI/Nutrition:   - Regular diet  - PPI prophylaxis     Fluids/  Electrolytes/Renal:   # Chronic kidney disease, III  # Chronic hyponatremia   Baseline Cr appears to be ~1.0-1.2. Baseline Na 127-132  - Strict I/O, daily weights, avoid/limit nephrotoxins  - Replete lytes PRN per protocol  - Potassium po 20 mEq BID  - Goal net even, will monitor off of diuretics     Endocrine:    # Stress hyperglycemia  Pre op HgA1c 5.9  - MDSSI  - Goal BG <180 for optimal healing      ID/ Antibiotics:  - Monitor fever curve, WBC, and inflammatory markers as appropriate     Heme:   # Hx DLBCL s/p CHOP in remission     # Acute blood loss anemia  # Post-CPB  thrombocytopenia  *Hx hives after plt transfusion. Plan pre-treat diphenhydramine if needs transfusion  No s/sx active bleeding  Postoperative thrombocytopenia stable >70  - Daily CBC   - Hgb goal > 7.0     MSK / Skin  # Rheumatoid arthritis  # Sternotomy  # Surgical Incision  Sternal precautions  - Postoperative incision management per protocol  - PT/OT/CR  - Continue PTA prednisone 3 mg daily  - Continue PTA Plaquenil 200 mg at bedtime      Prophylaxis:    - Mechanical DVT ppx  - Chemical DVT ppx: SQH  - PPI     Lines/ tubes/ drains:  - RIJ CVC  - CT x3 (1 R pleural, 2 mediastinal)  - R COOPER catheter      Disposition:  - CV ICU.      Patient seen, findings and plan discussed with CVICU staff Dr. Adam.    Thai Alvarez PA-C  04/15/23  3:56 PM     I spent a total of 40 minutes providing critical care services at the bedside, and on the critical care unit, evaluating the patient, directing care and reviewing laboratory values and radiologic reports for the patient. Critical care time separate from procedural time.     ====================================    TODAY'S PROGRESS  SUBJECTIVE  No overnight events. Pacer wires removed. Split CT. Encourage OOB. Awaiting floor bed     OBJECTIVE  1. VITAL SIGNS  Temp:  [97.9  F (36.6  C)-99  F (37.2  C)] 97.9  F (36.6  C)  Pulse:  [79-92] 79  Resp:  [12-24] 24  BP: ()/(53-67) 98/58  SpO2:  [96  %-99 %] 97 %  Resp: 24      2. INTAKE/ OUTPUT  I/O last 3 completed shifts:  In: 2040 [P.O.:2040]  Out: 1040 [Urine:850; Chest Tube:190]    3. PHYSICAL EXAMINATION  General: comfortable appearing, laying in bed, pleasant  HEENT: anicteric sclera, membranes moist  CV: normal capillary refill, AV paced   Respiratory: normal work of breathing, on room air. lung sounds clear bilaterally  Abdominal: soft, non-distended  Musculoskeletal: normal bulk and tone, petite  Skin: healed previous sternotomy. incision site c/d/i. CT to suction, intermittent airleak, serosanguinous output   Neurologic: normal speech, alert and oriented x3  Psychiatric: appropriate      4. INVESTIGATIONS  Arterial Blood Gases   Recent Labs   Lab 04/11/23  1500 04/11/23  1304 04/11/23  1133 04/11/23  0350   PH 7.43 7.55* 7.47* 7.46*   PCO2 37 28* 36 37   PO2 106* 169* 150* 183*   HCO3 25 24 26 26     Complete Blood Count   Recent Labs   Lab 04/15/23  0316 04/14/23  0421 04/13/23  0314 04/12/23  0355   WBC 7.2 7.0 9.4 11.6*   HGB 9.4* 10.0* 11.2* 11.2*   PLT 90* 82* 85* 76*     Basic Metabolic Panel  Recent Labs   Lab 04/15/23  1220 04/15/23  0855 04/15/23  0316 04/14/23  2123 04/14/23  1218 04/14/23  1029 04/14/23  0842 04/14/23  0421 04/13/23  1403 04/13/23  1101 04/13/23  0753 04/13/23  0314 04/12/23  0358 04/12/23  0355   NA  --   --  131*  --   --   --   --  135*  --   --   --  138  --  134*   POTASSIUM  --   --  3.7  --   --  4.0  --  3.3*  --  3.6  --  2.6*  2.6*   < > 3.9   CHLORIDE  --   --  95*  --   --   --   --  95*  --   --   --  94*  --  97*   CO2  --   --  26  --   --   --   --  31*  --   --   --  31*  --  24   BUN  --   --  22.1  --   --   --   --  25.5*  --   --   --  35.0*  --  48.1*   CR  --   --  0.70  --   --   --   --  0.81  --   --   --  0.91  --  1.45*   * 102* 103* 174*   < >  --    < > 115*   < >  --    < > 88   < > 99    < > = values in this interval not displayed.     Liver Function Tests  Recent Labs   Lab  04/15/23  0316 04/14/23  0421 04/13/23  0314 04/12/23  0355 04/11/23  0350 04/10/23  1327 04/10/23  1210 04/10/23  0604   AST 33 36 55* 89*   < > 105*  --   --    ALT 27 28 39 53*   < > 42  --   --    ALKPHOS 100 95 89 85   < > 83  --   --    BILITOTAL 1.2 1.4* 1.7* 1.3*   < > 1.9*  --   --    ALBUMIN 3.2* 3.4* 3.6 3.9   < > 3.4*  --   --    INR  --   --   --   --   --  1.71* 1.76* 1.20*    < > = values in this interval not displayed.     Pancreatic Enzymes  No lab results found in last 7 days.  Coagulation Profile  Recent Labs   Lab 04/10/23  1327 04/10/23  1210 04/10/23  0604   INR 1.71* 1.76* 1.20*   PTT 61* 35 29     Lactate  Invalid input(s): LACTATE    5. RADIOLOGY  Recent Results (from the past 24 hour(s))   XR Chest Port 1 View    Narrative    Examination: XR CHEST PORT 1 VIEW 4/10/2023 2:50 PM    Indication: Post Op CVTS Surgery    Comparison: X-ray 3/17/2023    Findings:  AP supine portable chest. Postoperative changes of tricuspid valve  repair. Median sternotomy wires, and mild perihilar haziness with mild  basilar atelectasis. Stable cardiomegaly. Right basilar chest tube. No  appreciable pneumothorax. Mediastinal surgical drains. Enteric tube  terminates over the stomach. No significant pleural effusions.  Endotracheal tube projects over the midthoracic trachea. Left  subclavian transvenous approach pacer device with leads and overlying  soft tissue position. No focal consolidation. Unremarkable limited  upper abdomen radiographically. Stable mild convex leftward  thoracolumbar curve. Degenerative spurring throughout the visualized  vertebral column.      Impression    Impression:   Stable cardiomegaly with postsurgical changes tricuspid valve repair.  Mild postsurgical perihilar edema and scattered atelectasis. Continued  follow-up to exclude infectious component.    I have personally reviewed the examination and initial interpretation  and I agree with the findings.    JUVENAL GALDAMEZ MD          SYSTEM ID:  [id number]   XR Chest Port 1 View    Narrative    EXAM: XR CHEST PORT 1 VIEW  4/11/2023 1:00 AM     HISTORY:  chest tubes s/p tricuspid valve replacement       COMPARISON:  Chest x-ray 4/10/2023    FINDINGS:   AP view of the chest. Postsurgical changes of tricuspid valve  replacement. Intact sternotomy wires. Endotracheal tube tip is  approximately 4.2 cm above the ermelinda. Enteric tube sidehole overlies  the stomach; the tip is out of the field-of-view. Left chest wall  pacemaker with leads in stable position. Right IJ central venous  catheter tip overlies the high SVC. Stable right chest tubes and  mediastinal drains.    Stable enlarged heart size. Stable perihilar opacities. No significant  pleural effusion. No pneumothorax. Subcutaneous emphysema in the right  supraclavicular region.      Impression    IMPRESSION:   1. Stable mild perihilar atelectasis/edema.  2. Stable support devices.    I have personally reviewed the examination and initial interpretation  and I agree with the findings.    RAMONA COELLO MD         SYSTEM ID:  [id number]

## 2023-04-15 NOTE — PROGRESS NOTES
Patient alert and oriented. Vitals stable. Controled afib in the 70s. Room air. Has 3 chest tubes to suction. Serosanguinous drain. On ropivacaine drip. Denies pain. Transferred from [place].

## 2023-04-15 NOTE — PLAN OF CARE
ICU End of Shift Summary. See flowsheets for vital signs and detailed assessment.    Changes this shift: No acute changes overnight. Patient a/o able to make needs known. Up standby assist to bedside commode. BP did not meet parameters for multiple doses of diltiazem. C/o pain, given PRN tylenol and Oxy. Patients IV mag replacement needed to be stopped d/t pain, able to take PO version.     Plan: Patient pending placement to [facility] when available. Follow POC, possibly removing CT

## 2023-04-15 NOTE — PLAN OF CARE
Time: 0700 - 1630    Major Shift Events: No acute changes in pt condition. Chest tubes split into pleural and med's, temporary pacer wires removed. Pt transferred to  at 1630; report given to 6D RN, meds, chart and belongings gathered and sent with pt.     Plan: Monitor lytes. Continue to encourage activity, PO intake, pulmonary hygiene. Update MD with changes. Follow plan of care.    For vital signs and complete assessments, please see documentation flowsheets.       Goal Outcome Evaluation:      Plan of Care Reviewed With: patient    Overall Patient Progress: no change

## 2023-04-16 ENCOUNTER — APPOINTMENT (OUTPATIENT)
Dept: GENERAL RADIOLOGY | Facility: CLINIC | Age: 63
DRG: 220 | End: 2023-04-16
Attending: THORACIC SURGERY (CARDIOTHORACIC VASCULAR SURGERY)
Payer: COMMERCIAL

## 2023-04-16 ENCOUNTER — APPOINTMENT (OUTPATIENT)
Dept: GENERAL RADIOLOGY | Facility: CLINIC | Age: 63
DRG: 220 | End: 2023-04-16
Attending: PHYSICIAN ASSISTANT
Payer: COMMERCIAL

## 2023-04-16 ENCOUNTER — APPOINTMENT (OUTPATIENT)
Dept: PHYSICAL THERAPY | Facility: CLINIC | Age: 63
DRG: 220 | End: 2023-04-16
Attending: THORACIC SURGERY (CARDIOTHORACIC VASCULAR SURGERY)
Payer: COMMERCIAL

## 2023-04-16 LAB
ALBUMIN SERPL BCG-MCNC: 3.4 G/DL (ref 3.5–5.2)
ALP SERPL-CCNC: 99 U/L (ref 35–129)
ALT SERPL W P-5'-P-CCNC: 24 U/L (ref 10–50)
ANION GAP SERPL CALCULATED.3IONS-SCNC: 12 MMOL/L (ref 7–15)
ANION GAP SERPL CALCULATED.3IONS-SCNC: 12 MMOL/L (ref 7–15)
AST SERPL W P-5'-P-CCNC: 32 U/L (ref 10–50)
BILIRUB SERPL-MCNC: 1.3 MG/DL
BUN SERPL-MCNC: 13.8 MG/DL (ref 8–23)
BUN SERPL-MCNC: 13.8 MG/DL (ref 8–23)
CA-I BLD-MCNC: 4.4 MG/DL (ref 4.4–5.2)
CALCIUM SERPL-MCNC: 8.6 MG/DL (ref 8.8–10.2)
CALCIUM SERPL-MCNC: 8.6 MG/DL (ref 8.8–10.2)
CHLORIDE SERPL-SCNC: 97 MMOL/L (ref 98–107)
CHLORIDE SERPL-SCNC: 97 MMOL/L (ref 98–107)
CREAT SERPL-MCNC: 0.66 MG/DL (ref 0.51–1.17)
CREAT SERPL-MCNC: 0.66 MG/DL (ref 0.51–1.17)
DEPRECATED HCO3 PLAS-SCNC: 22 MMOL/L (ref 22–29)
DEPRECATED HCO3 PLAS-SCNC: 22 MMOL/L (ref 22–29)
ERYTHROCYTE [DISTWIDTH] IN BLOOD BY AUTOMATED COUNT: 14.5 % (ref 10–15)
ERYTHROCYTE [DISTWIDTH] IN BLOOD BY AUTOMATED COUNT: 14.5 % (ref 10–15)
ERYTHROCYTE [DISTWIDTH] IN BLOOD BY AUTOMATED COUNT: 14.6 % (ref 10–15)
GFR SERPL CREATININE-BSD FRML MDRD: >90 ML/MIN/1.73M2
GFR SERPL CREATININE-BSD FRML MDRD: >90 ML/MIN/1.73M2
GLUCOSE BLDC GLUCOMTR-MCNC: 114 MG/DL (ref 70–99)
GLUCOSE BLDC GLUCOMTR-MCNC: 119 MG/DL (ref 70–99)
GLUCOSE BLDC GLUCOMTR-MCNC: 87 MG/DL (ref 70–99)
GLUCOSE BLDC GLUCOMTR-MCNC: 99 MG/DL (ref 70–99)
GLUCOSE SERPL-MCNC: 90 MG/DL (ref 70–99)
GLUCOSE SERPL-MCNC: 90 MG/DL (ref 70–99)
HCT VFR BLD AUTO: 28.7 % (ref 35–53)
HCT VFR BLD AUTO: 29.4 % (ref 35–53)
HCT VFR BLD AUTO: 29.8 % (ref 35–53)
HGB BLD-MCNC: 9.7 G/DL (ref 11.7–17.7)
HGB BLD-MCNC: 9.7 G/DL (ref 11.7–17.7)
HGB BLD-MCNC: 9.9 G/DL (ref 11.7–17.7)
MAGNESIUM SERPL-MCNC: 2 MG/DL (ref 1.7–2.3)
MCH RBC QN AUTO: 36.3 PG (ref 26.5–33)
MCH RBC QN AUTO: 36.5 PG (ref 26.5–33)
MCH RBC QN AUTO: 36.9 PG (ref 26.5–33)
MCHC RBC AUTO-ENTMCNC: 33 G/DL (ref 31.5–36.5)
MCHC RBC AUTO-ENTMCNC: 33.2 G/DL (ref 31.5–36.5)
MCHC RBC AUTO-ENTMCNC: 33.8 G/DL (ref 31.5–36.5)
MCV RBC AUTO: 109 FL (ref 78–100)
MCV RBC AUTO: 109 FL (ref 78–100)
MCV RBC AUTO: 111 FL (ref 78–100)
PHOSPHATE SERPL-MCNC: 2.2 MG/DL (ref 2.5–4.5)
PLATELET # BLD AUTO: 102 10E3/UL (ref 150–450)
PLATELET # BLD AUTO: 104 10E3/UL (ref 150–450)
PLATELET # BLD AUTO: 110 10E3/UL (ref 150–450)
POTASSIUM SERPL-SCNC: 4 MMOL/L (ref 3.4–5.3)
POTASSIUM SERPL-SCNC: 4 MMOL/L (ref 3.4–5.3)
PROT SERPL-MCNC: 6.2 G/DL (ref 6.4–8.3)
RBC # BLD AUTO: 2.63 10E6/UL (ref 3.8–5.9)
RBC # BLD AUTO: 2.66 10E6/UL (ref 3.8–5.9)
RBC # BLD AUTO: 2.73 10E6/UL (ref 3.8–5.9)
SODIUM SERPL-SCNC: 131 MMOL/L (ref 136–145)
SODIUM SERPL-SCNC: 131 MMOL/L (ref 136–145)
WBC # BLD AUTO: 6.7 10E3/UL (ref 4–11)
WBC # BLD AUTO: 7.1 10E3/UL (ref 4–11)
WBC # BLD AUTO: 9.3 10E3/UL (ref 4–11)

## 2023-04-16 PROCEDURE — 71046 X-RAY EXAM CHEST 2 VIEWS: CPT | Mod: 26 | Performed by: RADIOLOGY

## 2023-04-16 PROCEDURE — 250N000011 HC RX IP 250 OP 636: Performed by: PHYSICIAN ASSISTANT

## 2023-04-16 PROCEDURE — 250N000013 HC RX MED GY IP 250 OP 250 PS 637: Performed by: THORACIC SURGERY (CARDIOTHORACIC VASCULAR SURGERY)

## 2023-04-16 PROCEDURE — 271N000002 HC RX 271

## 2023-04-16 PROCEDURE — 97116 GAIT TRAINING THERAPY: CPT | Mod: GP

## 2023-04-16 PROCEDURE — 250N000013 HC RX MED GY IP 250 OP 250 PS 637

## 2023-04-16 PROCEDURE — 71046 X-RAY EXAM CHEST 2 VIEWS: CPT

## 2023-04-16 PROCEDURE — 82330 ASSAY OF CALCIUM: CPT | Performed by: PHYSICIAN ASSISTANT

## 2023-04-16 PROCEDURE — 250N000013 HC RX MED GY IP 250 OP 250 PS 637: Performed by: STUDENT IN AN ORGANIZED HEALTH CARE EDUCATION/TRAINING PROGRAM

## 2023-04-16 PROCEDURE — 250N000011 HC RX IP 250 OP 636

## 2023-04-16 PROCEDURE — 80053 COMPREHEN METABOLIC PANEL: CPT | Performed by: STUDENT IN AN ORGANIZED HEALTH CARE EDUCATION/TRAINING PROGRAM

## 2023-04-16 PROCEDURE — 250N000012 HC RX MED GY IP 250 OP 636 PS 637: Performed by: THORACIC SURGERY (CARDIOTHORACIC VASCULAR SURGERY)

## 2023-04-16 PROCEDURE — 85027 COMPLETE CBC AUTOMATED: CPT | Performed by: STUDENT IN AN ORGANIZED HEALTH CARE EDUCATION/TRAINING PROGRAM

## 2023-04-16 PROCEDURE — 99231 SBSQ HOSP IP/OBS SF/LOW 25: CPT | Mod: GC

## 2023-04-16 PROCEDURE — 120N000002 HC R&B MED SURG/OB UMMC

## 2023-04-16 PROCEDURE — 83735 ASSAY OF MAGNESIUM: CPT | Performed by: STUDENT IN AN ORGANIZED HEALTH CARE EDUCATION/TRAINING PROGRAM

## 2023-04-16 PROCEDURE — 36415 COLL VENOUS BLD VENIPUNCTURE: CPT | Performed by: PHYSICIAN ASSISTANT

## 2023-04-16 PROCEDURE — 250N000013 HC RX MED GY IP 250 OP 250 PS 637: Performed by: PHYSICIAN ASSISTANT

## 2023-04-16 PROCEDURE — 84100 ASSAY OF PHOSPHORUS: CPT | Performed by: STUDENT IN AN ORGANIZED HEALTH CARE EDUCATION/TRAINING PROGRAM

## 2023-04-16 PROCEDURE — 85027 COMPLETE CBC AUTOMATED: CPT | Performed by: PHYSICIAN ASSISTANT

## 2023-04-16 PROCEDURE — 71045 X-RAY EXAM CHEST 1 VIEW: CPT | Mod: 26 | Performed by: RADIOLOGY

## 2023-04-16 PROCEDURE — 97530 THERAPEUTIC ACTIVITIES: CPT | Mod: GP

## 2023-04-16 PROCEDURE — 71045 X-RAY EXAM CHEST 1 VIEW: CPT

## 2023-04-16 RX ORDER — DILTIAZEM HYDROCHLORIDE 120 MG/1
120 CAPSULE, COATED, EXTENDED RELEASE ORAL DAILY
Status: DISCONTINUED | OUTPATIENT
Start: 2023-04-17 | End: 2023-04-17 | Stop reason: HOSPADM

## 2023-04-16 RX ADMIN — SENNOSIDES AND DOCUSATE SODIUM 1 TABLET: 50; 8.6 TABLET ORAL at 08:53

## 2023-04-16 RX ADMIN — POTASSIUM & SODIUM PHOSPHATES POWDER PACK 280-160-250 MG 1 PACKET: 280-160-250 PACK at 15:35

## 2023-04-16 RX ADMIN — Medication 500 MG: at 00:34

## 2023-04-16 RX ADMIN — HYDROXYCHLOROQUINE SULFATE 200 MG: 200 TABLET, FILM COATED ORAL at 20:01

## 2023-04-16 RX ADMIN — METHOCARBAMOL 500 MG: 500 TABLET ORAL at 12:13

## 2023-04-16 RX ADMIN — GABAPENTIN 100 MG: 100 CAPSULE ORAL at 20:01

## 2023-04-16 RX ADMIN — Medication 3 MG: at 09:52

## 2023-04-16 RX ADMIN — POTASSIUM CHLORIDE 20 MEQ: 750 TABLET, EXTENDED RELEASE ORAL at 08:42

## 2023-04-16 RX ADMIN — GABAPENTIN 100 MG: 100 CAPSULE ORAL at 13:56

## 2023-04-16 RX ADMIN — Medication 10 MG: at 22:12

## 2023-04-16 RX ADMIN — DILTIAZEM HYDROCHLORIDE 30 MG: 30 TABLET, FILM COATED ORAL at 17:51

## 2023-04-16 RX ADMIN — HYDROMORPHONE HYDROCHLORIDE 0.4 MG: 0.2 INJECTION, SOLUTION INTRAMUSCULAR; INTRAVENOUS; SUBCUTANEOUS at 11:18

## 2023-04-16 RX ADMIN — ACETAMINOPHEN 650 MG: 325 TABLET ORAL at 08:50

## 2023-04-16 RX ADMIN — PANTOPRAZOLE SODIUM 40 MG: 40 TABLET, DELAYED RELEASE ORAL at 08:42

## 2023-04-16 RX ADMIN — OXYCODONE HYDROCHLORIDE 10 MG: 10 TABLET ORAL at 00:27

## 2023-04-16 RX ADMIN — DILTIAZEM HYDROCHLORIDE 30 MG: 30 TABLET, FILM COATED ORAL at 12:13

## 2023-04-16 RX ADMIN — POTASSIUM & SODIUM PHOSPHATES POWDER PACK 280-160-250 MG 1 PACKET: 280-160-250 PACK at 20:01

## 2023-04-16 RX ADMIN — HEPARIN SODIUM 5000 UNITS: 5000 INJECTION, SOLUTION INTRAVENOUS; SUBCUTANEOUS at 12:13

## 2023-04-16 RX ADMIN — POTASSIUM & SODIUM PHOSPHATES POWDER PACK 280-160-250 MG 1 PACKET: 280-160-250 PACK at 12:23

## 2023-04-16 RX ADMIN — METHOCARBAMOL 500 MG: 500 TABLET ORAL at 15:35

## 2023-04-16 RX ADMIN — ACETAMINOPHEN 650 MG: 325 TABLET ORAL at 22:12

## 2023-04-16 RX ADMIN — METHOCARBAMOL 500 MG: 500 TABLET ORAL at 08:42

## 2023-04-16 RX ADMIN — METHOCARBAMOL 500 MG: 500 TABLET ORAL at 20:01

## 2023-04-16 RX ADMIN — GABAPENTIN 100 MG: 100 CAPSULE ORAL at 08:42

## 2023-04-16 RX ADMIN — DILTIAZEM HYDROCHLORIDE 30 MG: 30 TABLET, FILM COATED ORAL at 00:21

## 2023-04-16 RX ADMIN — HEPARIN SODIUM 5000 UNITS: 5000 INJECTION, SOLUTION INTRAVENOUS; SUBCUTANEOUS at 04:35

## 2023-04-16 RX ADMIN — DILTIAZEM HYDROCHLORIDE 30 MG: 30 TABLET, FILM COATED ORAL at 07:03

## 2023-04-16 RX ADMIN — ASPIRIN 81 MG 81 MG: 81 TABLET ORAL at 08:42

## 2023-04-16 RX ADMIN — HEPARIN SODIUM 5000 UNITS: 5000 INJECTION, SOLUTION INTRAVENOUS; SUBCUTANEOUS at 20:01

## 2023-04-16 ASSESSMENT — ACTIVITIES OF DAILY LIVING (ADL)
ADLS_ACUITY_SCORE: 26
ADLS_ACUITY_SCORE: 25
ADLS_ACUITY_SCORE: 26
ADLS_ACUITY_SCORE: 26
ADLS_ACUITY_SCORE: 27
ADLS_ACUITY_SCORE: 27
ADLS_ACUITY_SCORE: 24
ADLS_ACUITY_SCORE: 27
ADLS_ACUITY_SCORE: 26

## 2023-04-16 NOTE — PROGRESS NOTES
Care Management Follow Up    Length of Stay (days): 6    Expected Discharge Date: 04/19/2023     Concerns to be Addressed:       Patient plan of care discussed at interdisciplinary rounds: Yes    Anticipated Discharge Disposition:  (pending patient hospital course)     Anticipated Discharge Services:  (pending patient hospital course, recommendations from PT/OT)  Anticipated Discharge DME:      Patient/family educated on Medicare website which has current facility and service quality ratings:    Education Provided on the Discharge Plan:    Patient/Family in Agreement with the Plan:      Referrals Placed by CM/SW:    Private pay costs discussed: Not applicable    Additional Information:  RNCC discussed discharge planning with CVTS providers. Patient could benefit from some continued therapy after hospital stay. RNCC notes pt has recs for both HC therapy and OP CR. RNCC discussed the two options with patient who is opting for OP CR, has gone to the center across from [facility] before, has friend who can drive her, also pt prefers not to be homebound. Patient has OP CR referral orders. RNCC available for any further discharge planning as needed.       PRADEEP Frias, BA, RN, CMSRN, RNCC  Covering Units 6D/OBS  Pager: [phone]  Phone: [phone]  6th floor Weekend/Holiday Pager: [phone]  Observation weekend/after hours: [phone]

## 2023-04-16 NOTE — PROGRESS NOTES
"Pain Service Progress Note  [facility]  Date: 04/16/2023       Patient Name: Amelia Michel  MRN: [id number]  Age: 62 year old  Sex: adult      Assessment:  Amelia Michel is a 62 year old female with PMH significant for cardiac arrest (2017) NSVT s/p PVC ablation (11/22), HFpEF, mildly reduced RV function, afib/aflutter, CKD III, severe tricuspid regurgitation    Procedure: s/p mini thoracotomy and TVR (using 29 mm porcine valve)    Date of Surgery: 4/10/23     Date of Catheter Placement: 4/10/23     Plan/Recommendations:  1. Regional Anesthesia/Analgesia  -Continuous Catheter Type/Site: right erector spinae (ES) T5-6  Infusate: ropivacaine 0.2%  Programmed Intermittent Bolus (PIB) at 10mL Q60 min    1000 Cinician administered nerve block bolus. VSS, MAP 60s.  PF BUPivacaine 0.25%, total bolus 10 mL via right catheter, administered without complication, negative aspirate before and during administration.  No symptoms of local anesthetic systemic toxicity (LAST). Remained with and assessed patient for 10 min post-injection. BP, P, and MAP stable.  Bedside RN aware of need to continue to monitoring and document BP, P, and MAP Q 10 min for an additional 20 min. Contact Pain Service if any of the following: patient experiencing any untoward effects, SBP < 90, P < 50 or > 120, MAP < 60    4/16: CT pulled at 11:30am, plan to remove R COOPER catheter this afternoon    2. Anticoagulation  -Please contact Inpatient Pain Service before ordering or making any anticoagulation changes       3. Multimodal Analgesia  - per primary service    Pain Service will continue to follow.      Discussed with attending anesthesiologist    Ramon Doss MD, CA-3  04/16/2023     Overnight Events: No acute events       Subjective:  I would like a bolus.\" Amelia reports good pain relief with q 12 hr bolus therefore requests bolus this AM for nonopioid management. Also on scheduled Tylenol and has received Dilaudid " "IV 0.2 mg x 2 doses  Nausea: No  Symptoms of LAST: No    Pain Location:  Chest tube pain    Pain Intensity:    Pain at Rest: 0/10   Pain with Activity: 0/10  Satisfied with your level of pain control: Yes    Diet: Advance Diet as Tolerated: Regular Diet Adult; Regular Diet Adult; 2 gm NA Diet    Relevant Labs:  Recent Labs   Lab Test 04/13/23  0314 04/11/23  0350 04/10/23  1327   INR  --   --  1.71*   PLT 85*   < > 76*   PTT  --   --  61*   BUN 35.0*   < > 24.7*    < > = values in this interval not displayed.       Physical Exam:  Vitals: /63   Pulse 76   Temp 37  C (98.6  F) (Oral)   Resp 15   Ht 1.448 m (4' 9\")   Wt 47.5 kg (104 lb 11.5 oz)   SpO2 98%   BMI 22.66 kg/m      Physical Exam:   Orientation:  Alert, oriented, and in no acute distress: Yes  Sedation: No    Motor Examination:  5/5 Strength in lower extremities: Yes    Catheter Site:   Catheter entry site is clean/dry/intact: Yes    Tender: No      Relevant Medications:  Current Pain Medications:  Medications related to Pain Management (From now, onward)    Start     Dose/Rate Route Frequency Ordered Stop    04/13/23 0000  acetaminophen (TYLENOL) tablet 650 mg         650 mg Oral EVERY 4 HOURS PRN 04/10/23 1309      04/11/23 0800  polyethylene glycol (MIRALAX) Packet 17 g         17 g Oral DAILY 04/10/23 1309      04/11/23 0800  aspirin (ASA) chewable tablet 81 mg         81 mg Oral or NG Tube DAILY 04/10/23 1309      04/10/23 2000  senna-docusate (SENOKOT-S/PERICOLACE) 8.6-50 MG per tablet 1 tablet         1 tablet Oral 2 TIMES DAILY 04/10/23 1309      04/10/23 1309  magnesium hydroxide (MILK OF MAGNESIA) suspension 30 mL         30 mL Oral DAILY PRN 04/10/23 1309      04/10/23 1309  bisacodyl (DULCOLAX) suppository 10 mg         10 mg Rectal DAILY PRN 04/10/23 1309      04/10/23 1309  HYDROmorphone (DILAUDID) injection 0.2 mg        See Hyperspace for full Linked Orders Report.    0.2 mg Intravenous EVERY 2 HOURS PRN 04/10/23 1309      " "04/10/23 1309  HYDROmorphone (DILAUDID) injection 0.4 mg        See Hyperspace for full Linked Orders Report.    0.4 mg Intravenous EVERY 2 HOURS PRN 04/10/23 1309      04/10/23 1309  oxyCODONE (ROXICODONE) tablet 5 mg        See Hyperspace for full Linked Orders Report.    5 mg Oral EVERY 4 HOURS PRN 04/10/23 1309      04/10/23 1309  oxyCODONE IR (ROXICODONE) tablet 10 mg        See Hyperspace for full Linked Orders Report.    10 mg Oral EVERY 4 HOURS PRN 04/10/23 1309      04/10/23 1309  lidocaine 1 % 0.1-1 mL         0.1-1 mL Other EVERY 1 HOUR PRN 04/10/23 1309      04/10/23 1309  lidocaine (LMX4) cream          Topical EVERY 1 HOUR PRN 04/10/23 1309      04/10/23 1030  ropivacaine 0.2% in NS perineural infusion (simple)          Perineural Continuous Nerve Block 04/10/23 1023            Primary Service Contacted with Recommendations? No      Please see A&P for additional details of medical decision making.      Acute Inpatient Pain Service [facility]  Hours of pain coverage 24/7   Page via Amcom- Please Page the Pain Team Via Amcom: \"PAIN MANAGEMENT ACUTE INPATIENT/ [facility]\"             "

## 2023-04-16 NOTE — PLAN OF CARE
NURSING PROGRESS NOTE  Shift Summary      Date: April 16, 2023     Neuro/Musculoskeletal:  A&Ox4  Cardiac: Afib.  VSS.   Respiratory:  Sating in the 90s on RA  GI/:  Adequate urine output.  LBM: 4/14  Diet/Appetite:  Tolerating reg diet.  Activity:  Assist of 1  Pain:  Ropivacaine and PRN medications, received oxycodone and Tylenol once tonight.  Skin:  No new deficits noted.  LDAs + Drips/IVF:  RFA  Protocols/Labs:  K+, Phos & Mg    Pertinent Shift Updates:  None      Plan:  Monitor chest tube output, pain control, IS and Flutter      Christine Villaloobs RN  .................................................... April 16, 2023   6:59 AM  [facility] ([facility] ICU (Unit 6D)    Goal Outcome Evaluation:      Plan of Care Reviewed With: patient    Overall Patient Progress: improvingOverall Patient Progress: improving    Outcome Evaluation: Patient is A/O X 4, pain is controlled after receiving PRN oral medication. She is SBA to the commode, CT are still in place with moderate output.

## 2023-04-16 NOTE — PROGRESS NOTES
"/63   Pulse 76   Temp 98.6  F (37  C) (Oral)   Resp 15   Ht 1.448 m (4' 9\")   Wt 47.5 kg (104 lb 11.5 oz)   SpO2 98%   BMI 22.66 kg/m      Shift: 2225-5761  Reason for Admission: s/p mini thoracotomy and TVR 4/10/23  VS/Tele: VSS on RA. SR  Neuros: A/Ox4, able to make needs known  Respiratory: Sats >95% on RA  GI/: No BMs this shift  Nutrition: Tolerating regular diet  Drains/Lines: R. PIV SL. Nerve block in place  Activity: A1 + GB + walker  Pain/Nausea: Denies pain  Skin: 3x CT sites- CDI  Labs: BG ACHS, Phos replaced, pending AM recheck  New this shift: 3x CT pulled this shift.   Plan of care: Will continue to monitor and follow POC.     "

## 2023-04-16 NOTE — PROGRESS NOTES
Cardiovascular Surgery Progress Note  04/16/2023         Assessment and Plan:     Amelia Michel is a 62 year old adult with PMH of cardiac arrest (2017) NSVT s/p PVC ablation (11/22), HFpEF, mildly reduced RV function, afib/aflutter, CKD III, severe tricuspid regurgitation. Now POD#2 s/p mini thoracotomy and TVR (Porcine 29 mm valve) with Dr. Cope 4/10/23.    Cardiovascular:   # Hypertension   # Hx VSD s/p repair (1969)  # Hx of Vfib cardiac arrest (2017)  # Chronic diastolic heart failure NYHA Class III  # HFpEF 55-60%   # Mildly reduced RV function  # Severe tricuspid regurgitation s/p TVR   # Cardiogenic shock  Goal MAP >65, SBP <140, monitor hemodynamics  - Hold PTA spironolactone, empagliflozin  - ASA: 82 mg   - resume PTA dilt -- was taking 240 ER, currently on 30 mg Q6H with nursing hold parameters. Will switch to ER tomorrow at reduced dose.   Chest tubes: removed today   TPW: removed 4/16    # Hx of Vfib cardiac arrest (2017)  # SSS s/p PPM (2019)  # Frequent, NSVT s/p VT ablation (11/30/2022)  # Hx of afib/aflutter   Dual chamber PPM (2019), Inherent Pacing mode DDDR  - Device check 4/11 VVIR changed to DDDR  BPM  - [id number] 2 on PTA abixaban  - Hold PTA apixaban; Follow up when to resume 4/16     Pulmonary:  - Extubated POD 1 ; Saturating well on RA   - Supplemental O2 PRN to keep sats > 92%. Wean off as tolerated.  - Pulm toilet, IS, activity and deep breathing    Neurology / MSK:  - Neuro intact  - Acute post-operative pain; pain well controlled with acetaminophen, PO oxycodone PRN, IV dilaudid PRN. Robaxin.      / Renal:  # Chronic kidney disease, III  # Chronic hyponatremia   Baseline Cr appears to be ~1.0-1.2. Baseline Na 127-132  - Strict I/O, daily weights, avoid/limit nephrotoxins  - Replete lytes PRN per protocol  - Potassium po 20 mEq BID-stopped, replace per protocol   - Goal net even, will monitor off of diuretics   Most recent creatinine NL, adequate UOP.   - Pre-op weight  108 lbs, today's weight 104 lbs.  Diuresis: hold diuresis     GI / FEN:   - Regular diet, continue bowel regimen  - Replace electrolytes as needed, hepatic enzymes WNL.    Endocrine:  Stress induced hyperglycemia; initially managed on insulin drip postop, transitioned to sliding scale.  Take Jardiance at home, currently on hold.     Infectious Disease:  - Stress induced leukocytosis; WBC WNL, remains afebrile, no signs or symptoms of infection  - Completed perioperative antibiotics    Hematology:   # Hx DLBCL s/p CHOP in remission     # Acute blood loss anemia  # Post-CPB  thrombocytopenia  *Hx hives after plt transfusion. Plan pre-treat diphenhydramine if needs transfusion  No s/sx active bleeding  Postoperative thrombocytopenia stable >70  - Daily CBC   - Hgb goal > 7.0  Acute blood loss anemia and thrombocytopenia  Hgb stable; Plt stable, no signs or symptoms of active bleeding    MSK / Skin  # Rheumatoid arthritis  # Sternotomy  # Surgical Incision  Sternal precautions  - Postoperative incision management per protocol  - PT/OT/CR  - Continue PTA prednisone 3 mg daily  - Continue PTA Plaquenil 200 mg at bedtime     Anticoagulation:   - ASA   - Discuss when to start home apixaban for a-fib. Likely at discharge.     Prophylaxis:   - Stress ulcer prophylaxis: Pantoprazole 40 mg daily for 30 days  - DVT prophylaxis: Subcutaneous heparin, SCD    Disposition:   - Transferred to    - Therapies recommending discharge to home   - Can remove COOPER catheter.     Discussed with Dr. Jesus Lovell PASohailC  Cardiothoracic Surgery  Pager [phone]  April 16, 2023          Interval History:     No overnight events.  States pain is well managed on current regimen. Slept well overnight.  Tolerating diet and tube feeds, is passing flatus, + BM. No nausea or vomiting.  Breathing well without complaints.   Working with therapies and ambulating in halls with assistance.   Denies chest pain, palpitations, dizziness,  "syncopal symptoms, fevers, chills, myalgias, or sternal popping/clicking.         Physical Exam:   Blood pressure 100/61, pulse 70, temperature 99.1  F (37.3  C), temperature source Oral, resp. rate 18, height 1.448 m (4' 9\"), weight 47.5 kg (104 lb 11.5 oz), SpO2 100 %, not currently breastfeeding.  Vitals:    04/14/23 0000 04/15/23 0000 04/16/23 0709   Weight: 45.5 kg (100 lb 5 oz) 45.5 kg (100 lb 5 oz) 47.5 kg (104 lb 11.5 oz)          Gen: A&Ox4, NAD  Neuro: no focal deficits   CV: RRR, normal S1 S2, no murmurs, rubs or gallops. no JVD  Pulm: CTA, no wheezing or rhonchi, normal breathing on RA  Abd: nondistended, normal BS, soft, nontender  Ext: no peripheral edema  Incision: clean, dry, intact, no erythema, sternum stable  Tubes/drain sites: dressing clean and dry, serosanguinous output, no air leak. 24 hr output 180 mL.     All chest tubes removed without incidence.          Data:    Imaging:  reviewed recent imaging, no acute concerns    Labs:  [facility]  Recent Labs   Lab 04/16/23  1218 04/16/23  0752 04/16/23  0639 04/15/23  2211 04/15/23  0855 04/15/23  0316 04/14/23  1218 04/14/23  1029 04/14/23  0842 04/14/23  0421 04/13/23  0753 04/13/23  0314   NA  --   --  131*  131*  --   --  131*  --   --   --  135*  --  138   POTASSIUM  --   --  4.0  4.0  --   --  3.7  --  4.0  --  3.3*   < > 2.6*  2.6*   CHLORIDE  --   --  97*  97*  --   --  95*  --   --   --  95*  --  94*   VIKTORIYA  --   --  8.6*  8.6*  --   --  8.4*  --   --   --  8.9  --  9.0   CO2  --   --  22  22  --   --  26  --   --   --  31*  --  31*   BUN  --   --  13.8  13.8  --   --  22.1  --   --   --  25.5*  --  35.0*   CR  --   --  0.66  0.66  --   --  0.70  --   --   --  0.81  --  0.91   GLC 99 87 90  90 120*   < > 103*   < >  --    < > 115*   < > 88    < > = values in this interval not displayed.     CBC  Recent Labs   Lab 04/16/23  0919 04/16/23  0639 04/15/23  0316 04/14/23  0421   WBC 7.1 6.7 7.2 7.0   RBC 2.63* 2.66* 2.60* 2.79*   HGB 9.7* 9.7* " 9.4* 10.0*   HCT 28.7* 29.4* 28.0* 29.7*   * 111* 108* 107*   MCH 36.9* 36.5* 36.2* 35.8*   MCHC 33.8 33.0 33.6 33.7   RDW 14.5 14.5 14.1 13.7   * 102* 90* 82*     INR  Recent Labs   Lab 04/10/23  1327 04/10/23  1210 04/10/23  0604   INR 1.71* 1.76* 1.20*      Hepatic Panel  Recent Labs   Lab 04/16/23  0639 04/15/23  0316 04/14/23  0421 04/13/23  0314   AST 32 33 36 55*   ALT 24 27 28 39   ALKPHOS 99 100 95 89   BILITOTAL 1.3* 1.2 1.4* 1.7*   ALBUMIN 3.4* 3.2* 3.4* 3.6     GLUCOSE:   Recent Labs   Lab 04/16/23  1218 04/16/23  0752 04/16/23  0639 04/15/23  2211 04/15/23  2028 04/15/23  1647   GLC 99 87 90  90 120* 149* 118*

## 2023-04-17 ENCOUNTER — APPOINTMENT (OUTPATIENT)
Dept: CARDIOLOGY | Facility: CLINIC | Age: 63
DRG: 220 | End: 2023-04-17
Attending: PHYSICIAN ASSISTANT
Payer: COMMERCIAL

## 2023-04-17 ENCOUNTER — APPOINTMENT (OUTPATIENT)
Dept: OCCUPATIONAL THERAPY | Facility: CLINIC | Age: 63
DRG: 220 | End: 2023-04-17
Attending: THORACIC SURGERY (CARDIOTHORACIC VASCULAR SURGERY)
Payer: COMMERCIAL

## 2023-04-17 ENCOUNTER — APPOINTMENT (OUTPATIENT)
Dept: PHYSICAL THERAPY | Facility: CLINIC | Age: 63
DRG: 220 | End: 2023-04-17
Attending: THORACIC SURGERY (CARDIOTHORACIC VASCULAR SURGERY)
Payer: COMMERCIAL

## 2023-04-17 VITALS
RESPIRATION RATE: 21 BRPM | BODY MASS INDEX: 22.12 KG/M2 | TEMPERATURE: 97.9 F | HEART RATE: 87 BPM | WEIGHT: 102.51 LBS | DIASTOLIC BLOOD PRESSURE: 63 MMHG | SYSTOLIC BLOOD PRESSURE: 102 MMHG | HEIGHT: 57 IN | OXYGEN SATURATION: 99 %

## 2023-04-17 DIAGNOSIS — Z95.4 S/P TVR (TRICUSPID VALVE REPLACEMENT): Primary | ICD-10-CM

## 2023-04-17 LAB
ALBUMIN SERPL BCG-MCNC: 3.6 G/DL (ref 3.5–5.2)
ALP SERPL-CCNC: 115 U/L (ref 35–129)
ALT SERPL W P-5'-P-CCNC: 24 U/L (ref 10–50)
ANION GAP SERPL CALCULATED.3IONS-SCNC: 12 MMOL/L (ref 7–15)
AST SERPL W P-5'-P-CCNC: 32 U/L (ref 10–50)
BILIRUB SERPL-MCNC: 1.5 MG/DL
BUN SERPL-MCNC: 10.7 MG/DL (ref 8–23)
CALCIUM SERPL-MCNC: 9.2 MG/DL (ref 8.8–10.2)
CHLORIDE SERPL-SCNC: 97 MMOL/L (ref 98–107)
CREAT SERPL-MCNC: 0.61 MG/DL (ref 0.51–1.17)
DEPRECATED HCO3 PLAS-SCNC: 23 MMOL/L (ref 22–29)
ERYTHROCYTE [DISTWIDTH] IN BLOOD BY AUTOMATED COUNT: 14.6 % (ref 10–15)
GFR SERPL CREATININE-BSD FRML MDRD: >90 ML/MIN/1.73M2
GLUCOSE BLDC GLUCOMTR-MCNC: 102 MG/DL (ref 70–99)
GLUCOSE BLDC GLUCOMTR-MCNC: 82 MG/DL (ref 70–99)
GLUCOSE SERPL-MCNC: 88 MG/DL (ref 70–99)
HCT VFR BLD AUTO: 29.5 % (ref 35–53)
HGB BLD-MCNC: 9.9 G/DL (ref 11.7–17.7)
LVEF ECHO: NORMAL
MCH RBC QN AUTO: 36.4 PG (ref 26.5–33)
MCHC RBC AUTO-ENTMCNC: 33.6 G/DL (ref 31.5–36.5)
MCV RBC AUTO: 109 FL (ref 78–100)
PHOSPHATE SERPL-MCNC: 2.7 MG/DL (ref 2.5–4.5)
PLATELET # BLD AUTO: 115 10E3/UL (ref 150–450)
POTASSIUM SERPL-SCNC: 3.9 MMOL/L (ref 3.4–5.3)
PROT SERPL-MCNC: 6.4 G/DL (ref 6.4–8.3)
RBC # BLD AUTO: 2.72 10E6/UL (ref 3.8–5.9)
SODIUM SERPL-SCNC: 132 MMOL/L (ref 136–145)
WBC # BLD AUTO: 6.5 10E3/UL (ref 4–11)

## 2023-04-17 PROCEDURE — 84100 ASSAY OF PHOSPHORUS: CPT | Performed by: THORACIC SURGERY (CARDIOTHORACIC VASCULAR SURGERY)

## 2023-04-17 PROCEDURE — 97116 GAIT TRAINING THERAPY: CPT | Mod: GP | Performed by: PHYSICAL THERAPIST

## 2023-04-17 PROCEDURE — 250N000011 HC RX IP 250 OP 636: Performed by: PHYSICIAN ASSISTANT

## 2023-04-17 PROCEDURE — 250N000013 HC RX MED GY IP 250 OP 250 PS 637: Performed by: THORACIC SURGERY (CARDIOTHORACIC VASCULAR SURGERY)

## 2023-04-17 PROCEDURE — 93306 TTE W/DOPPLER COMPLETE: CPT | Mod: 26 | Performed by: INTERNAL MEDICINE

## 2023-04-17 PROCEDURE — 250N000013 HC RX MED GY IP 250 OP 250 PS 637: Performed by: PHYSICIAN ASSISTANT

## 2023-04-17 PROCEDURE — 93306 TTE W/DOPPLER COMPLETE: CPT

## 2023-04-17 PROCEDURE — 97535 SELF CARE MNGMENT TRAINING: CPT | Mod: GO | Performed by: OCCUPATIONAL THERAPIST

## 2023-04-17 PROCEDURE — 85027 COMPLETE CBC AUTOMATED: CPT | Performed by: STUDENT IN AN ORGANIZED HEALTH CARE EDUCATION/TRAINING PROGRAM

## 2023-04-17 PROCEDURE — 97530 THERAPEUTIC ACTIVITIES: CPT | Mod: GP | Performed by: PHYSICAL THERAPIST

## 2023-04-17 PROCEDURE — 250N000013 HC RX MED GY IP 250 OP 250 PS 637: Performed by: STUDENT IN AN ORGANIZED HEALTH CARE EDUCATION/TRAINING PROGRAM

## 2023-04-17 PROCEDURE — 80053 COMPREHEN METABOLIC PANEL: CPT | Performed by: STUDENT IN AN ORGANIZED HEALTH CARE EDUCATION/TRAINING PROGRAM

## 2023-04-17 PROCEDURE — 250N000012 HC RX MED GY IP 250 OP 636 PS 637: Performed by: THORACIC SURGERY (CARDIOTHORACIC VASCULAR SURGERY)

## 2023-04-17 PROCEDURE — 36415 COLL VENOUS BLD VENIPUNCTURE: CPT | Performed by: STUDENT IN AN ORGANIZED HEALTH CARE EDUCATION/TRAINING PROGRAM

## 2023-04-17 PROCEDURE — 250N000013 HC RX MED GY IP 250 OP 250 PS 637

## 2023-04-17 RX ORDER — PANTOPRAZOLE SODIUM 40 MG/1
40 TABLET, DELAYED RELEASE ORAL DAILY
Qty: 21 TABLET | Refills: 0 | Status: SHIPPED | OUTPATIENT
Start: 2023-04-18 | End: 2023-05-09

## 2023-04-17 RX ORDER — ACETAMINOPHEN 325 MG/1
650 TABLET ORAL EVERY 4 HOURS PRN
Qty: 100 TABLET | Refills: 1 | Status: ON HOLD | OUTPATIENT
Start: 2023-04-17 | End: 2024-01-25

## 2023-04-17 RX ORDER — PHENOL 1.4 %
10 AEROSOL, SPRAY (ML) MUCOUS MEMBRANE AT BEDTIME
Qty: 30 TABLET | Refills: 1 | Status: SHIPPED | OUTPATIENT
Start: 2023-04-17 | End: 2023-05-02

## 2023-04-17 RX ORDER — DILTIAZEM HYDROCHLORIDE 120 MG/1
120 CAPSULE, COATED, EXTENDED RELEASE ORAL DAILY
Qty: 30 CAPSULE | Refills: 2 | Status: SHIPPED | OUTPATIENT
Start: 2023-04-18 | End: 2023-08-08

## 2023-04-17 RX ORDER — POLYETHYLENE GLYCOL 3350 17 G/17G
17 POWDER, FOR SOLUTION ORAL DAILY PRN
Qty: 30 PACKET | Refills: 0 | Status: SHIPPED | OUTPATIENT
Start: 2023-04-17 | End: 2023-04-26

## 2023-04-17 RX ORDER — METHOCARBAMOL 500 MG/1
500 TABLET, FILM COATED ORAL 4 TIMES DAILY
Qty: 30 TABLET | Refills: 0 | Status: SHIPPED | OUTPATIENT
Start: 2023-04-17 | End: 2023-04-26

## 2023-04-17 RX ORDER — ASPIRIN 81 MG/1
81 TABLET, CHEWABLE ORAL DAILY
Qty: 30 TABLET | Refills: 2 | Status: SHIPPED | OUTPATIENT
Start: 2023-04-18

## 2023-04-17 RX ORDER — OXYCODONE HYDROCHLORIDE 5 MG/1
5 TABLET ORAL EVERY 4 HOURS PRN
Qty: 10 TABLET | Refills: 0 | Status: SHIPPED | OUTPATIENT
Start: 2023-04-17 | End: 2023-04-26

## 2023-04-17 RX ADMIN — ACETAMINOPHEN 650 MG: 325 TABLET ORAL at 13:46

## 2023-04-17 RX ADMIN — Medication 3 MG: at 08:26

## 2023-04-17 RX ADMIN — GABAPENTIN 100 MG: 100 CAPSULE ORAL at 13:46

## 2023-04-17 RX ADMIN — POTASSIUM & SODIUM PHOSPHATES POWDER PACK 280-160-250 MG 1 PACKET: 280-160-250 PACK at 10:35

## 2023-04-17 RX ADMIN — HEPARIN SODIUM 5000 UNITS: 5000 INJECTION, SOLUTION INTRAVENOUS; SUBCUTANEOUS at 04:12

## 2023-04-17 RX ADMIN — HEPARIN SODIUM 5000 UNITS: 5000 INJECTION, SOLUTION INTRAVENOUS; SUBCUTANEOUS at 11:16

## 2023-04-17 RX ADMIN — ASPIRIN 81 MG 81 MG: 81 TABLET ORAL at 08:25

## 2023-04-17 RX ADMIN — METHOCARBAMOL 500 MG: 500 TABLET ORAL at 08:24

## 2023-04-17 RX ADMIN — DILTIAZEM HYDROCHLORIDE 120 MG: 120 CAPSULE, COATED, EXTENDED RELEASE ORAL at 08:25

## 2023-04-17 RX ADMIN — GABAPENTIN 100 MG: 100 CAPSULE ORAL at 08:24

## 2023-04-17 RX ADMIN — METHOCARBAMOL 500 MG: 500 TABLET ORAL at 11:16

## 2023-04-17 RX ADMIN — PANTOPRAZOLE SODIUM 40 MG: 40 TABLET, DELAYED RELEASE ORAL at 08:24

## 2023-04-17 RX ADMIN — ACETAMINOPHEN 650 MG: 325 TABLET ORAL at 04:21

## 2023-04-17 ASSESSMENT — ACTIVITIES OF DAILY LIVING (ADL)
ADLS_ACUITY_SCORE: 25
ADLS_ACUITY_SCORE: 24
ADLS_ACUITY_SCORE: 25
ADLS_ACUITY_SCORE: 24
ADLS_ACUITY_SCORE: 25
ADLS_ACUITY_SCORE: 24
ADLS_ACUITY_SCORE: 25
ADLS_ACUITY_SCORE: 24

## 2023-04-17 NOTE — DISCHARGE SUMMARY
[facility], [place]   Cardiothoracic Surgery Hospital Discharge Summary     Fidelia Michel MRN# [id number]   Age: 62 year old YOB: 1960     Admitting Physician:  Chilango Cope MD  Discharge Physician:  Melida Loredo PA-C  Primary Care Physician:        Gala Stringer     DATE OF ADMISSION: 4/10/2023      DATE OF DISCHARGE: April 17, 2023     Admit Wt: 108 lbs  Discharge Wt: 102 lbs          Primary Diagnoses:     Severe tricuspid insufficiency, s/p tricuspid valve replacement          Secondary Diagnoses:     Acute postoperative pain, improving    Stress induced hyperglycemia, resolved    Stress induced leukocytosis, resolved    Acute blood loss anemia, stable    Acute blood loss thrombocytopenia, resolved    HTN    Pulmonary HTN    HLD    RA    Lymphedema of both lower extremities    Diffuse large B-Cell lymphoma of extranodal site    Diffuse large B-cell lymphoma    Hx of febril neutropenia    Physical deconditioning, improving    Hx of cardiac arrest 2017    SSS s/p PPM 2019    Hx NSVT s/p VT ablation 11/30/22    Hx of atrial flutter/PAF    Post-CPB thrombocytopenia    CHF    CKD, stage 3    Iatrogenic cushingoid features    Steroid-induced osteoporosis    Chronic hyponatremia    Acute on chronic diastolic heart failure    Chronic hypervolemia    HFpEF (EF 55-60%)    PROCEDURES PERFORMED:   Date: 04/10/2023.  Surgeon: Dr. Cope  1. Minimally invasive tricuspid valve replacement - 29mm [place] Epic  2. Repair of right femoral vessels  3. Transesophageal echocardiography    INTRAOPERATIVE FINDINGS:    1. Severely dilated tricuspid annulus with retraction of all valve leaflets    PATHOLOGY RESULTS:    none     CULTURE RESULTS:    none    CONSULTS:      PT/OT    Intensivist    BRIEF HISTORY OF ILLNESS:  FIDELIA MICHEL is a 62 year old adult admitted with severe tricuspid insufficiency.  We were asked to evaluate for surgical repair / replacement.   Risks and benefits of the operation were explained to the patient and their family including, but not limited to, bleeding, infection, stroke and even death.  They understood these risks and agreed to proceed electively.    HOSPITAL COURSE: Amelia Michel is a 62 year old adult who on 4/10/2023 underwent the above-named procedures and tolerated the operation well.     Postoperatively was admitted to the CVICU.  Patient was extubated within protocol on POD #1.  Blood pressure and cardiac index were managed with vasopressors and inotropic agents which were continuously weaned until no longer needed.  Patient was subsequently  transferred to the surgical telemetry floor.    While on the surgical unit, the patient continued to progress well. Chest tubes and temporary pacemaker wires were removed when deemed appropriate.     Patient was fluid overloaded and treated with diuretics. They are down about 6lbs from preoperative weight and will discharge with no additional days of diuretic therapy but she has a history of hypervolemia and has PRNs available and knows what to watch for with her weight; will re-evaluate need in clinic follow-up.      Patient was transiently hyperglycemic and treated with insulin infusion then transitioned to sliding scale insulin per protocol. Blood sugars remained stable. No further glycemic control agents needed at this time.     Prior to discharge, Amelia Michel's pain was controlled well, Amelia Michel was working well with therapies, able to perform most ADLs, ambulate without difficulty, and had full return of bowel and bladder function.  On April 17, 2023, Amelia Michel was discharged to home in stable condition. Follow up with cardiology and cardiac surgery have been arranged. Pt encouraged to follow up with PCP and cardiac rehab upon discharge.    Patient discharged on aspirin:  Yes, 81 mg  Patient discharged on beta blocker: no    Patient discharged on ACE Inhibitor/ARB: no   "    Patient discharged on statin: no   Patient discharged on CCB: yes, PTA Diltiazem          Discharge Disposition:     Discharged to home            Condition on Discharge:     Discharge condition: Stable   Discharge vitals: Blood pressure 106/66, pulse 86, temperature 97.8  F (36.6  C), temperature source Oral, resp. rate 14, height 1.448 m (4' 9\"), weight 46.5 kg (102 lb 8.2 oz), SpO2 100 %, not currently breastfeeding.     Code status on discharge: Full Code     Vitals:    04/15/23 0000 04/16/23 0709 04/17/23 0554   Weight: 45.5 kg (100 lb 5 oz) 47.5 kg (104 lb 11.5 oz) 46.5 kg (102 lb 8.2 oz)       DAY OF DISCHARGE PHYSICAL EXAM:    Gen: A&Ox4, NAD  Neuro: no focal deficits   CV: RRR, normal S1 S2, no murmurs, rubs or gallops.   Pulm: CTA, no wheezing or rhonchi, normal breathing on RA  Abd: nondistended, normal BS, soft, nontender  Ext: no peripheral edema, no pitting  Incision: clean, dry, intact, no erythema, sternum stable  Tubes/drain sites: dressing clean and dry    LABS  Most Recent 3 CBC's:  Recent Labs   Lab Test 04/17/23  0834 04/16/23  1712 04/16/23  0919   WBC 6.5 9.3 7.1   HGB 9.9* 9.9* 9.7*   * 109* 109*   * 110* 104*      Most Recent 3 BMP's:  Recent Labs   Lab Test 04/17/23  1217 04/17/23  0834 04/17/23  0800 04/16/23  0752 04/16/23  0639 04/15/23  0855 04/15/23  0316   NA  --  132*  --   --  131*  131*  --  131*   POTASSIUM  --  3.9  --   --  4.0  4.0  --  3.7   CHLORIDE  --  97*  --   --  97*  97*  --  95*   CO2  --  23  --   --  22  22  --  26   BUN  --  10.7  --   --  13.8  13.8  --  22.1   CR  --  0.61  --   --  0.66  0.66  --  0.70   ANIONGAP  --  12  --   --  12  12  --  10   VIKTORIYA  --  9.2  --   --  8.6*  8.6*  --  8.4*   * 88 82   < > 90  90   < > 103*    < > = values in this interval not displayed.     Most Recent 2 LFT's:  Recent Labs   Lab Test 04/17/23  0834 04/16/23  0639   AST 32 32   ALT 24 24   ALKPHOS 115 99   BILITOTAL 1.5* 1.3*     Most Recent " INR's and Anticoagulation Dosing History:  Anticoagulation Dose History         Latest Ref Rng & Units 11/7/2017 12/18/2017 3/7/2018 11/19/2022   Recent Dosing and Labs   INR 0.85 - 1.15 1.0   1.1   3.42   4.57         3/17/2023 3/27/2023 4/10/2023   Recent Dosing and Labs   INR 1.95   1.22   1.71      1.76      1.20           Multiple values from one day are sorted in reverse-chronological order           Most Recent 3 Troponin's:  Recent Labs   Lab Test 12/12/19  1153 09/10/17  1717 09/10/17  1333 05/29/17  0720 05/29/17  0703   TROPI <0.015 0.043 0.042   < >  --    TROPONIN  --   --   --   --  0.19*    < > = values in this interval not displayed.     Most Recent Cholesterol Panel:  Recent Labs   Lab Test 12/21/22  1039   CHOL 98   LDL 42   HDL 43   TRIG 67     Most Recent 6 Bacteria Isolates From Any Culture (See EPIC Reports for Culture Details):  Recent Labs   Lab Test 03/01/18  0930 11/07/17  1130 08/23/17  0910 08/16/17  1616 08/12/17  0322 08/11/17  0402   CULT 10,000 to 50,000 colonies/mL  Proteus mirabilis  * No growth No growth Specimen not received  Canceled, Test credited     [facility] was notified specimen will be collected 8/21 or 8/22/17.  Called to  [facility] @ [facility] to notify this order was cancelled and  future order will need to be   requested for future collection. 1025 8/17/17. NAP   No growth No growth     Most Recent TSH, T4 and A1c Labs:  Recent Labs   Lab Test 03/17/23  1320 02/13/23  1048   TSH  --  3.43   A1C 5.9*  --       Recent Labs   Lab 04/17/23  1217 04/17/23  0834 04/17/23  0800 04/16/23  2216 04/16/23  1638 04/16/23  1218   * 88 82 114* 119* 99       Imaging:  Results for orders placed or performed during the hospital encounter of 04/10/23   XR Chest Port 1 View    Narrative    Examination: XR CHEST PORT 1 VIEW 4/10/2023 2:50 PM    Indication: Post Op CVTS Surgery    Comparison: X-ray 3/17/2023    Findings:  AP supine portable chest. Postoperative changes of tricuspid  valve  repair. Median sternotomy wires, and mild perihilar haziness with mild  basilar atelectasis. Stable cardiomegaly. Right basilar chest tube. No  appreciable pneumothorax. Mediastinal surgical drains. Enteric tube  terminates over the stomach. No significant pleural effusions.  Endotracheal tube projects over the midthoracic trachea. Left  subclavian transvenous approach pacer device with leads and overlying  soft tissue position. No focal consolidation. Unremarkable limited  upper abdomen radiographically. Stable mild convex leftward  thoracolumbar curve. Degenerative spurring throughout the visualized  vertebral column.      Impression    Impression:   Stable cardiomegaly with postsurgical changes tricuspid valve repair.  Mild postsurgical perihilar edema and scattered atelectasis. Continued  follow-up to exclude infectious component.    I have personally reviewed the examination and initial interpretation  and I agree with the findings.    JUVENAL GALDAMEZ MD         SYSTEM ID:  [id number]   XR Chest Port 1 View    Narrative    EXAM: XR CHEST PORT 1 VIEW  4/11/2023 1:00 AM     HISTORY:  chest tubes s/p tricuspid valve replacement       COMPARISON:  Chest x-ray 4/10/2023    FINDINGS:   AP view of the chest. Postsurgical changes of tricuspid valve  replacement. Intact sternotomy wires. Endotracheal tube tip is  approximately 4.2 cm above the ermelinda. Enteric tube sidehole overlies  the stomach; the tip is out of the field-of-view. Left chest wall  pacemaker with leads in stable position. Right IJ central venous  catheter tip overlies the high SVC. Stable right chest tubes and  mediastinal drains.    Stable enlarged heart size. Stable perihilar opacities. No significant  pleural effusion. No pneumothorax. Subcutaneous emphysema in the right  supraclavicular region.      Impression    IMPRESSION:   1. Stable mild perihilar atelectasis/edema.  2. Stable support devices.    I have personally reviewed the  examination and initial interpretation  and I agree with the findings.    RAMONA COELLO MD         SYSTEM ID:  [id number]   XR Chest Port 1 View    Narrative    EXAM: XR CHEST PORT 1 VIEW  4/12/2023 12:55 AM     HISTORY:  chest tubes s/p tricuspid valve replacement       COMPARISON:  Chest x-ray 4/11/2023    FINDINGS:   AP view of the chest. Postsurgical changes of tricuspid valve  replacement. Interval extubation and removal of enteric tube. Intact  sternotomy wires. Left chest wall pacemaker with leads in stable  position. Right IJ central venous catheter tip overlies the high SVC.  Stable right chest tubes and mediastinal drains.    Stable enlarged heart size. Stable perihilar opacities. No significant  pleural effusion. No pneumothorax. Similar air tracking along the  inferior heart border, likely due to pneumomediastinum. Subcutaneous  emphysema in the right supraclavicular region.      Impression    IMPRESSION:   1. Interval extubation and removal of enteric tube.  2. Stable cardiomegaly with mild perihilar opacities.    I have personally reviewed the examination and initial interpretation  and I agree with the findings.    AIRAM GUAJARDO MD         SYSTEM ID:  [id number]   XR Chest Port 1 View    Narrative    EXAM: XR CHEST PORT 1 VIEW  4/13/2023 12:58 AM     HISTORY:  chest tubes s/p tricuspid valve replacement       COMPARISON:  Chest x-ray 4/12/2023    FINDINGS:   AP view of the chest. Postsurgical changes of tricuspid valve  replacement with intact sternotomy wires. Left chest wall pacemaker  with leads in stable position. Right IJ central venous catheter tip  overlies the high SVC. Stable right chest tubes and mediastinal  drains.    Stable enlarged heart. Stable mild perihilar opacities. No pleural  effusion. No pneumothorax. Trace residual subcutaneous emphysema in  the right supraclavicular region. Unchanged clumped pericardial  calcifications projecting over the T7-T8 left paravertebral region.       Impression    IMPRESSION:   1. Stable cardiomegaly with mild perihilar atelectasis/edema.  2. Stable support devices.    I have personally reviewed the examination and initial interpretation  and I agree with the findings.    FERNANDEZ CHIN MD         SYSTEM ID:  [id number]   XR Chest Port 1 View    Narrative    EXAM: XR CHEST PORT 1 VIEW  4/14/2023 1:30 AM     HISTORY:  chest tubes s/p tricuspid valve replacement       COMPARISON:  Chest x-ray 4/13/2023    FINDINGS:   AP view of the chest. Postsurgical changes of tricuspid valve  replacement with intact sternotomy wires. Left chest wall pacemaker  with leads in stable position. Right IJ central venous catheter tip  overlies the high SVC. Stable right chest tubes and mediastinal  drains. Stable enlarged heart. No significant change in the mild  perihilar opacities. Developing/increased opacities in the lateral  right upper lobe. No pleural effusion. No pneumothorax.       Impression    IMPRESSION: 1. Developing/increased opacities in the lateral right  upper lobe. Differential includes infection.  2. Stable perihilar atelectasis/edema.    I have personally reviewed the examination and initial interpretation  and I agree with the findings.    NHAN COLMENARES MD         SYSTEM ID:  [id number]   XR Chest Port 1 View    Narrative    EXAM: XR CHEST PORT 1 VIEW  4/15/2023 8:50 AM     HISTORY:  chest tubes s/p tricuspid valve replacement       COMPARISON:  4/14/2023    FINDINGS:   AP upright radiograph the chest. Left subclavian transvenous approach  pacemaker again noted with unchanged appearance of distal leads.  Postsurgical changes of the chest with intact median sternotomy wires.  Stable position of right-sided chest tubes and mediastinal drains.  IInterval removal of right IJ central venous catheter. Trachea is  midline. Stable enlarged cardiac silhouette.. No substantial change in  appearance of perihilar, right greater than left, hazy pulmonary  opacities.  Additional increased hazy pulmonary opacities within the  right upper lobe. No pleural effusion or appreciable pneumothorax    No acute osseous abnormality. Visualized upper abdomen is  unremarkable.        Impression    IMPRESSION:  1. Cardiomegaly with perihilar, right greater than left, hazy  pulmonary opacities, likely atelectasis/edema.  2. Increasing hazy opacities in the right upper lobe, favored to  represent developing infection. Recommend follow-up to clearing.    I have personally reviewed the examination and initial interpretation  and I agree with the findings.    JUVENAL GALDAMEZ MD         SYSTEM ID:  [id number]   XR Chest Port 1 View    Narrative    Exam: XR CHEST PORT 1 VIEW, 4/16/2023 8:49 AM    Comparison: Radiograph 4/15/2023, 4/14/2023    History: chest tubes s/p tricuspid valve replacement    Findings:  Portable AP view of the chest. Left chest wall cardiac device with  leads in stable position. Intact median sternotomy wires. Right chest  tubes and mediastinal drains in place. Tricuspid valve replacement.    Trachea is midline. Cardiomediastinal silhouette is enlarged with  prominent main pulmonary artery segment, stable. Similar patchy  perihilar opacities. Similar hazy opacity in the right upper lobe. No  pleural effusion or appreciable pneumothorax.      Impression    Impression:   1. Persistent hazy opacities in the right upper lobe, may represent  infection. Continued attention recommended on follow-up.  2. Persistent perihilar opacities, likely atelectasis and/or edema.    I have personally reviewed the examination and initial interpretation  and I agree with the findings.    FERNANDEZ CHIN MD         SYSTEM ID:  [id number]   XR Chest 2 Views    Narrative    Exam: XR CHEST 2 VIEWS, 4/16/2023 4:57 PM    Comparison: Radiograph same day, 4/15/2023    History: chest tube removal    Findings:  PA and lateral views of the chest. Stable postsurgical changes of the  chest with intact median  sternotomy wires. Left chest wall pacemaker  with leads in stable position. Interval removal of the right-sided  chest tubes.    Stable enlargement of the cardiomediastinal silhouette. Aortic arch  calcifications. Similar multifocal patchy pulmonary opacities with  slightly decreased hazy opacities in the right upper lobe. No pleural  effusion. No appreciable pneumothorax. Visualized upper abdomen is  unremarkable. No acute osseous abnormality.      Impression    Impression:   1. Interval removal of the right-sided chest tubes. No appreciable  pneumothorax.  2. Similar multifocal patchy pulmonary opacities in both lungs with  decreased hazy opacities in the right upper lobe. Cardiomegaly.    I have personally reviewed the examination and initial interpretation  and I agree with the findings.    JUVENAL GALDAMEZ MD         SYSTEM ID:  [id number]   Cardiac Device Check - Inpatient     Value    Date Time Interrogation Session [id number]    Implantable Pulse Generator  Medtronic    Implantable Pulse Generator Model [id number] [place] XT  MRI    Implantable Pulse Generator Serial Number [id number]    Type Interrogation Session In Clinic    Clinic Name [facility]    Implantable Pulse Generator Type Pacemaker    Implantable Pulse Generator Implant Date 20191213    Implantable Lead  Medtronic    Implantable Lead Model 5076 CapSureFix Novus MRI SureScan    Implantable Lead Serial Number [id number]    Implantable Lead Implant Date 20191213    Implantable Lead Polarity Type Bipolar Lead    Implantable Lead Location Detail 1 APPENDAGE    Implantable Lead Special Function Length 52 cm    Implantable Lead Location Right Atrium    Implantable Lead  Medtronic    Implantable Lead Model 5076 CapSureFix Novus MRI SureScan    Implantable Lead Serial Number [id number]    Implantable Lead Implant Date 20191213    Implantable Lead Polarity Type Bipolar Lead    Implantable Lead  Location Detail 1 SEPTUM    Implantable Lead Special Function Length 58 cm    Implantable Lead Location Right Ventricle    Reyes Setting Mode (NBG Code) DDDR    Reyes Setting Lower Rate Limit 75    Reyes Setting Maximum Tracking Rate 130    Reyes Setting Maximum Sensor Rate 130    Reyes Setting Hysterisis Rate DISABLED    Reyes Setting JAKI Delay Low 150    Reyes Setting PAV Delay Low 180    Reyes Setting AT Mode Switch Rate 171    Lead Channel Setting Sensing Polarity Bipolar    Lead Channel Setting Sensing Anode Location Right Atrium    Lead Channel Setting Sensing Anode Terminal Ring    Lead Channel Setting Sensing Cathode Location Right Atrium    Lead Channel Setting Sensing Cathode Terminal Tip    Lead Channel Setting Sensing Sensitivity 0.3    Lead Channel Setting Sensing Polarity Bipolar    Lead Channel Setting Sensing Anode Location Right Ventricle    Lead Channel Setting Sensing Anode Terminal Ring    Lead Channel Setting Sensing Cathode Location Right Ventricle    Lead Channel Setting Sensing Cathode Terminal Tip    Lead Channel Setting Sensing Sensitivity 0.9    Lead Channel Setting Pacing Polarity Bipolar    Lead Channel Setting Pacing Anode Location Right Atrium    Lead Channel Setting Pacing Anode Terminal Ring    Lead Channel Setting Sensing Cathode Location Right Atrium    Lead Channel Setting Sensing Cathode Terminal Tip    Lead Channel Setting Pacing Pulse Width 0.4    Lead Channel Setting Pacing Amplitude 3.5    Lead Channel Setting Pacing Capture Mode Adaptive    Lead Channel Setting Pacing Polarity Bipolar    Lead Channel Setting Pacing Anode Location Right Ventricle    Lead Channel Setting Pacing Anode Terminal Ring    Lead Channel Setting Sensing Cathode Location Right Ventricle    Lead Channel Setting Sensing Cathode Terminal Tip    Lead Channel Setting Pacing Pulse Width 0.4    Lead Channel Setting Pacing Amplitude 2    Lead Channel Setting Pacing Capture Mode Adaptive    Zone Setting Type  Category VF    Zone Setting Type Category VT    Zone Setting Type Category VT    Zone Setting Type Category VT    Zone Setting Detection Interval 400    Zone Setting Type Category ATRIAL_FIBRILLATION    Zone Setting Type Category AT/AF    Zone Setting Detection Interval 350    Lead Channel Impedance Value 342    Lead Channel Impedance Value 285    Lead Channel Pacing Threshold Amplitude 1.5    Lead Channel Pacing Threshold Pulse Width 0.4    Lead Channel Impedance Value 437    Lead Channel Impedance Value 342    Lead Channel Sensing Intrinsic Amplitude 4.9    Lead Channel Pacing Threshold Amplitude 0.75    Lead Channel Pacing Threshold Pulse Width 0.4    Battery Date Time of Measurements 68871149806244    Battery Status OK    Battery RRT Trigger 2.625    Battery Remaining Longevity 126    Battery Voltage 3.02    Reyes Statistic Date Time Start 01685401349053    Reyes Statistic Date Time End 08970900579024    Reyes Statistic RA Percent Paced 0    Reyes Statistic RV Percent Paced 21.11    Reyes Statistic AP  Percent 0    Reyes Statistic AS  Percent 21.11    Reyes Statistic AP VS Percent 0    Reyes Statistic AS VS Percent 78.89    Atrial Tachy Statistic Date Time Start 92530470313968    Atrial Tachy Statistic Date Time End 04583961494912    Atrial Tachy Statistic AT/AF [place] Percent 0    Episode Statistic Recent Count 0    Episode Statistic Type Category AT/AF    Episode Statistic Recent Count 2,568    Episode Statistic Type Category VT    Episode Statistic Recent Count 183    Episode Statistic Type Category VT    Episode Statistic Recent Date Time Start 97297174675337    Episode Statistic Recent Date Time End 09849446459500    Episode Statistic Recent Date Time Start 87163271032933    Episode Statistic Recent Date Time End 23510318922578    Episode Statistic Recent Date Time Start 38519276287080    Episode Statistic Recent Date Time End 75020001987306    Episode Statistic Total Count 3,691    Episode Statistic  Type Category AT/AF    Episode Statistic Total Count 26,614    Episode Statistic Type Category VT    Episode Statistic Total Count 393    Episode Statistic Type Category VT    Episode Statistic Total Date Time Start 20191213163058    Episode Statistic Total Date Time End 76058366115954    Episode Statistic Total Date Time Start 20191213163058    Episode Statistic Total Date Time End 31491452201746    Episode Statistic Total Date Time Start 20191213163058    Episode Statistic Total Date Time End 63609186545193    Episode Identifier 30,851    Episode Type Category VT1_MONITOR    Episode Date Time 04686674311567    Episode Duration 119    Episode Identifier 30,845    Episode Type Category VT1_MONITOR    Episode Date Time 05286420738843    Episode Duration 74    Episode Identifier 30,843    Episode Type Category VT1_MONITOR    Episode Date Time 32503494243128    Episode Duration 108    Episode Identifier 30,840    Episode Type Category VT1_MONITOR    Episode Date Time 49440319524758    Episode Duration 109    Episode Identifier 30,802    Episode Type Category VT1_MONITOR    Episode Date Time 46632487474601    Episode Duration 669    Episode Identifier 30,776    Episode Type Category VT1_MONITOR    Episode Date Time 98943517598457    Episode Duration 10    Episode Identifier 30,775    Episode Type Category VT1_MONITOR    Episode Date Time 82121527546649    Episode Duration 769    Episode Identifier 30,750    Episode Type Category VT1_MONITOR    Episode Date Time 47028645536514    Episode Duration 48    Episode Identifier 30,745    Episode Type Category VT1_MONITOR    Episode Date Time 94430543410177    Episode Duration 43    Episode Identifier 30,734    Episode Type Category VT1_MONITOR    Episode Date Time 43937622172046    Episode Duration 210    Episode Identifier 30,711    Episode Type Category VT1_MONITOR    Episode Date Time 29609608468172    Episode Duration 331    Episode Identifier 30,690    Episode Type  Category VT1_MONITOR    Episode Date Time [id number]    Episode Duration 1,675    Episode Identifier 30,680    Episode Type Category VT1_MONITOR    Episode Date Time [id number]    Episode Duration 96    Episode Identifier 30,677    Episode Type Category VT1_MONITOR    Episode Date Time [id number]    Episode Duration 62    Episode Identifier 30,645    Episode Type Category VT1_MONITOR    Episode Date Time [id number]    Episode Duration 24    Episode Identifier 30,631    Episode Type Category VT1_MONITOR    Episode Date Time [id number]    Episode Duration 64    Episode Identifier 30,622    Episode Type Category VT1_MONITOR    Episode Date Time [id number]    Episode Duration 100    Episode Identifier 30,619    Episode Type Category VT1_MONITOR    Episode Date Time [id number]    Episode Duration 196    Episode Identifier 30,614    Episode Type Category VT1_MONITOR    Episode Date Time [id number]    Episode Duration 13    Episode Identifier 30,611    Episode Type Category VT1_MONITOR    Episode Date Time [id number]    Episode Duration 447    Episode Identifier 30,603    Episode Type Category VT1_MONITOR    Episode Date Time [id number]    Episode Duration 86    Episode Identifier 30,599    Episode Type Category VT1_MONITOR    Episode Date Time [id number]    Episode Duration 279    Episode Identifier 30,589    Episode Type Category VT1_MONITOR    Episode Date Time [id number]    Episode Duration 51    Episode Identifier 30,581    Episode Type Category VT1_MONITOR    Episode Date Time [id number]    Episode Duration 120    Episode Identifier 30,574    Episode Type Category VT1_MONITOR    Episode Date Time [id number]    Episode Duration 115    Episode Identifier 30,567    Episode Type Category VT1_MONITOR    Episode Date Time [id number]    Episode Duration 62    Episode Identifier 30,552    Episode Type Category VT1_MONITOR    Episode Date Time [id number]     Episode Duration 34    Episode Identifier 30,548    Episode Type Category VT1_MONITOR    Episode Date Time [id number]    Episode Duration 79    Episode Identifier 30,544    Episode Type Category VT1_MONITOR    Episode Date Time [id number]    Episode Duration 170    Episode Identifier 30,543    Episode Type Category VT1_MONITOR    Episode Date Time [id number]    Episode Duration 42    Episode Identifier 30,541    Episode Type Category VT1_MONITOR    Episode Date Time [id number]    Episode Duration 82    Episode Identifier 30,521    Episode Type Category VT1_MONITOR    Episode Date Time [id number]    Episode Duration 35    Episode Identifier 30,436    Episode Type Category VT1_MONITOR    Episode Date Time [id number]    Episode Duration 31    Episode Identifier 30,395    Episode Type Category VT1_MONITOR    Episode Date Time [id number]    Episode Duration 118    Episode Identifier 30,391    Episode Type Category VT1_MONITOR    Episode Date Time [id number]    Episode Duration 148    Episode Identifier 30,386    Episode Type Category VT1_MONITOR    Episode Date Time [id number]    Episode Duration 217    Episode Identifier 30,385    Episode Type Category VT1_MONITOR    Episode Date Time [id number]    Episode Duration 51    Episode Identifier 30,351    Episode Type Category VT1_MONITOR    Episode Date Time [id number]    Episode Duration 129    Episode Identifier 30,342    Episode Type Category VT1_MONITOR    Episode Date Time [id number]    Episode Duration 70    Episode Identifier 30,314    Episode Type Category VT1_MONITOR    Episode Date Time [id number]    Episode Duration 162    Episode Identifier 30,310    Episode Type Category VT1_MONITOR    Episode Date Time [id number]    Episode Duration 147    Episode Identifier 30,301    Episode Type Category VT1_MONITOR    Episode Date Time [id number]    Episode Duration 67    Episode Identifier 30,278    Episode Type  Category VT1_MONITOR    Episode Date Time [id number]    Episode Duration 24    Episode Identifier 30,274    Episode Type Category VT1_MONITOR    Episode Date Time [id number]    Episode Duration 65    Episode Identifier 30,236    Episode Type Category VT1_MONITOR    Episode Date Time [id number]    Episode Duration 110    Episode Identifier 30,223    Episode Type Category VT1_MONITOR    Episode Date Time [id number]    Episode Duration 89    Episode Identifier 30,193    Episode Type Category VT1_MONITOR    Episode Date Time [id number]    Episode Duration 25    Episode Identifier 30,177    Episode Type Category VT1_MONITOR    Episode Date Time [id number]    Episode Duration 82    Episode Identifier 30,167    Episode Type Category VT1_MONITOR    Episode Date Time [id number]    Episode Duration 283    Episode Identifier 30,150    Episode Type Category VT1_MONITOR    Episode Date Time [id number]    Episode Duration 37    Episode Identifier 30,860    Episode Type Category VT    Episode Date Time [id number]    Episode Duration 1    Episode Identifier 30,859    Episode Type Category VT    Episode Date Time [id number]    Episode Duration 1    Episode Identifier 30,858    Episode Type Category VT    Episode Date Time [id number]    Episode Duration 2    Episode Identifier 30,857    Episode Type Category VT    Episode Date Time [id number]    Episode Duration 1    Episode Identifier 30,856    Episode Type Category VT    Episode Date Time [id number]    Episode Duration 1    Episode Identifier 30,855    Episode Type Category VT    Episode Date Time [id number]    Episode Duration 1    Episode Identifier 30,854    Episode Type Category VT    Episode Date Time [id number]    Episode Duration 2    Episode Identifier 30,853    Episode Type Category VT    Episode Date Time [id number]    Episode Duration 1    Episode Identifier 30,852    Episode Type Category VT    Episode Date  Time [id number]    Episode Duration 4    Episode Identifier 30,850    Episode Type Category VT    Episode Date Time [id number]    Episode Duration 1    Episode Identifier 30,849    Episode Type Category VT    Episode Date Time [id number]    Episode Duration 0    Episode Identifier 30,848    Episode Type Category VT    Episode Date Time [id number]    Episode Duration 2    Episode Identifier 30,847    Episode Type Category VT    Episode Date Time [id number]    Episode Duration 0    Episode Identifier 30,846    Episode Type Category VT    Episode Date Time [id number]    Episode Duration 1    Episode Identifier 30,844    Episode Type Category VT    Episode Date Time [id number]    Episode Duration 3    Narrative    Patient seen in [address] [place] [address] for evaluation and iterative programming of their   pacemaker per MD orders. Patient is s/p TAVR on 4/10/23.    Device: [employer] [id number] Claudia XT DR GALLEGOS  Normal Device Function.   Mode: VVIR  bpm --> DDDR  bpm  AP: 0%  : 21.1%  Intrinsic rhythm: Ventricular escape rhythm @ 36-42 bpm  Short V-V intervals: 1  Lead Trends Appear Stable: Yes  Estimated battery longevity to RRT = 5.9 years  Atrial arrhythmia: Previously chronic AF, not in AF s/p TAVR  AF burden: N/R  Anticoagulant: Eliquis  Ventricular Arrhythmia: 2,751 VT episodes recorded (2568 VT-NS and 183   VT-Mon), lasting <1 second to 28 minutes in duration at 140-244 bpm. Most   EGMs suggest AF RVR, a few nonsustained episodes suggest true VT runs.  Setting changes: Mode changed to DDDR in the absence of AF today.    Plan: Continue inpatient evaluation and treatment.  HILARY Pacheco RN    Dual lead pacemaker    I have reviewed and interpreted the device interrogation, settings,   programming and nurse's summary. The device is functioning within normal   device parameters. I agree with the current findings, assessment and plan.       *Note: Due to a large number of results and/or encounters  for the requested time period, some results have not been displayed. A complete set of results can be found in Results Review.          PRE-ADMISSION MEDICATIONS:  Medications Prior to Admission   Medication Sig Dispense Refill Last Dose     alendronate (FOSAMAX) 70 MG tablet Take 1 tablet (70 mg) by mouth every 7 days (Patient taking differently: Take 70 mg by mouth every 7 days Mondays) 12 tablet 3 Past Week     apixaban ANTICOAGULANT (ELIQUIS ANTICOAGULANT) 5 MG tablet Take 1 tablet (5 mg) by mouth 2 times daily 180 tablet 3 4/4/2023     calcium carbonate 600 mg-vitamin D 400 units (CALTRATE) 600-400 MG-UNIT per tablet Take 2 tablets by mouth daily   Past Month     dexamethasone (DECADRON) 4 MG/ML injection Inject 4 mg IM for adrenal crisis (Patient taking differently: Inject 4 mg as directed once as needed (adrenal crisis)) 2 mL 3      docusate sodium (COLACE) 100 MG capsule Take 100 mg by mouth 2 times daily as needed for constipation   4/9/2023     empagliflozin (JARDIANCE) 10 MG TABS tablet Take 1 tablet (10 mg) by mouth daily for 360 days 90 tablet 3 4/3/2023     gabapentin (NEURONTIN) 100 MG capsule TAKE 1 CAPSULE BY MOUTH IN THE MORNING, 1 CAPSULE BY MOUTH IN THE AFTERNOON, AND 2 CAPSULES BY MOUTH AT BEDTIME 360 capsule 0 4/10/2023 at 0415     hydroxychloroquine (PLAQUENIL) 200 MG tablet TAKE 1 TABLET(200 MG) BY MOUTH DAILY (Patient taking differently: Take 200 mg by mouth every evening) 90 tablet 0 4/9/2023 at 2100     loratadine (CLARITIN) 10 MG tablet Take 10 mg by mouth daily   4/9/2023 at 95585     magnesium oxide 200 MG TABS Take 200 mg by mouth daily bedtime   Past Month     metolazone (ZAROXOLYN) 2.5 MG tablet Take 1 tablet (2.5 mg) by mouth as needed (ONLY take if instructed to do so by your cardiology team.) 5 tablet 0 Unknown     multivitamin, therapeutic with minerals (THERA-VIT-M) TABS tablet Take 1 tablet by mouth daily 30 each 0 Past Month     predniSONE (DELTASONE) 1 MG tablet Take 3  tablets (3 mg) by mouth daily - Oral 270 tablet 1 Past Week     predniSONE (DELTASONE) 5 MG tablet 10 mg when sick for 3 days or until back to baseline for secondary adrenal insufficiency 30 tablet 3 Unknown     spironolactone (ALDACTONE) 25 MG tablet Take 0.5 tablets (12.5 mg) by mouth daily 45 tablet 1 4/9/2023 at 0700     Vitamin D (Cholecalciferol) 10 MCG (400 UNIT) TABS Take 1 tablet by mouth daily   Past Week     [DISCONTINUED] acetaminophen (TYLENOL) 500 MG tablet Take 500 mg by mouth every 6 hours as needed for mild pain   4/9/2023 at 2100     [DISCONTINUED] diltiazem ER COATED BEADS (CARDIZEM CD/CARTIA XT) 240 MG 24 hr capsule Take 1 capsule (240 mg) by mouth daily 90 capsule 1 4/10/2023 at 0415     [DISCONTINUED] torsemide (DEMADEX) 20 MG tablet Take 1 tablet (20 mg) by mouth 2 times daily 180 tablet 3 4/9/2023 at 1500       DISCHARGE MEDICATIONS:   Current Discharge Medication List      START taking these medications    Details   aspirin (ASA) 81 MG chewable tablet 1 tablet (81 mg) by Oral or NG Tube route daily  Qty: 30 tablet, Refills: 2    Associated Diagnoses: Severe tricuspid regurgitation; S/P TVR (tricuspid valve replacement)      melatonin 10 MG TABS tablet Take 1 tablet (10 mg) by mouth At Bedtime  Qty: 30 tablet, Refills: 1    Associated Diagnoses: Severe tricuspid regurgitation; S/P TVR (tricuspid valve replacement)      methocarbamol (ROBAXIN) 500 MG tablet Take 1 tablet (500 mg) by mouth 4 times daily  Qty: 30 tablet, Refills: 0    Associated Diagnoses: Severe tricuspid regurgitation; S/P TVR (tricuspid valve replacement)      oxyCODONE (ROXICODONE) 5 MG tablet Take 1 tablet (5 mg) by mouth every 4 hours as needed for moderate to severe pain  Qty: 10 tablet, Refills: 0    Associated Diagnoses: Severe tricuspid regurgitation; S/P TVR (tricuspid valve replacement)      pantoprazole (PROTONIX) 40 MG EC tablet Take 1 tablet (40 mg) by mouth daily for 21 days  Qty: 21 tablet, Refills: 0     Associated Diagnoses: Severe tricuspid regurgitation; S/P TVR (tricuspid valve replacement)      polyethylene glycol (MIRALAX) 17 g packet Take 17 g by mouth daily as needed for constipation  Qty: 30 packet, Refills: 0    Associated Diagnoses: Severe tricuspid regurgitation; S/P TVR (tricuspid valve replacement)         CONTINUE these medications which have CHANGED    Details   acetaminophen (TYLENOL) 325 MG tablet Take 2 tablets (650 mg) by mouth every 4 hours as needed for other (For optimal non-opioid multimodal pain management to improve pain control.)  Qty: 100 tablet, Refills: 1    Associated Diagnoses: Severe tricuspid regurgitation; S/P TVR (tricuspid valve replacement)      diltiazem ER COATED BEADS (CARDIZEM CD/CARTIA XT) 120 MG 24 hr capsule Take 1 capsule (120 mg) by mouth daily  Qty: 30 capsule, Refills: 2    Associated Diagnoses: Severe tricuspid regurgitation; S/P TVR (tricuspid valve replacement)         CONTINUE these medications which have NOT CHANGED    Details   alendronate (FOSAMAX) 70 MG tablet Take 1 tablet (70 mg) by mouth every 7 days  Qty: 12 tablet, Refills: 3    Associated Diagnoses: Steroid-induced osteoporosis      apixaban ANTICOAGULANT (ELIQUIS ANTICOAGULANT) 5 MG tablet Take 1 tablet (5 mg) by mouth 2 times daily  Qty: 180 tablet, Refills: 3    Associated Diagnoses: Paroxysmal atrial fibrillation (H)      calcium carbonate 600 mg-vitamin D 400 units (CALTRATE) 600-400 MG-UNIT per tablet Take 2 tablets by mouth daily      dexamethasone (DECADRON) 4 MG/ML injection Inject 4 mg IM for adrenal crisis  Qty: 2 mL, Refills: 3    Comments: Dispense injection kit  Associated Diagnoses: Steroid-induced osteoporosis; Secondary adrenal insufficiency (H)      docusate sodium (COLACE) 100 MG capsule Take 100 mg by mouth 2 times daily as needed for constipation      empagliflozin (JARDIANCE) 10 MG TABS tablet Take 1 tablet (10 mg) by mouth daily for 360 days  Qty: 90 tablet, Refills: 3     Associated Diagnoses: Acute on chronic diastolic congestive heart failure (H)      gabapentin (NEURONTIN) 100 MG capsule TAKE 1 CAPSULE BY MOUTH IN THE MORNING, 1 CAPSULE BY MOUTH IN THE AFTERNOON, AND 2 CAPSULES BY MOUTH AT BEDTIME  Qty: 360 capsule, Refills: 0    Comments: ZERO refills remain on this prescription. Your patient is requesting advance approval of refills for this medication to PREVENT ANY MISSED DOSES  Associated Diagnoses: Rheumatoid arthritis of both ankles, unspecified whether rheumatoid factor present (H)      hydroxychloroquine (PLAQUENIL) 200 MG tablet TAKE 1 TABLET(200 MG) BY MOUTH DAILY  Qty: 90 tablet, Refills: 0    Associated Diagnoses: Rheumatoid arthritis involving multiple sites with positive rheumatoid factor (H); Steroid-induced osteoporosis      loratadine (CLARITIN) 10 MG tablet Take 10 mg by mouth daily      magnesium oxide 200 MG TABS Take 200 mg by mouth daily bedtime      metolazone (ZAROXOLYN) 2.5 MG tablet Take 1 tablet (2.5 mg) by mouth as needed (ONLY take if instructed to do so by your cardiology team.)  Qty: 5 tablet, Refills: 0    Associated Diagnoses: Congestive heart failure, unspecified HF chronicity, unspecified heart failure type (H)      multivitamin, therapeutic with minerals (THERA-VIT-M) TABS tablet Take 1 tablet by mouth daily  Qty: 30 each, Refills: 0    Associated Diagnoses: Diffuse large B-cell lymphoma, unspecified body region (H)      !! predniSONE (DELTASONE) 1 MG tablet Take 3 tablets (3 mg) by mouth daily - Oral  Qty: 270 tablet, Refills: 1    Associated Diagnoses: Rheumatoid arthritis involving multiple sites with positive rheumatoid factor (H)      !! predniSONE (DELTASONE) 5 MG tablet 10 mg when sick for 3 days or until back to baseline for secondary adrenal insufficiency  Qty: 30 tablet, Refills: 3    Associated Diagnoses: Steroid-induced osteoporosis; Secondary adrenal insufficiency (H)      spironolactone (ALDACTONE) 25 MG tablet Take 0.5 tablets  (12.5 mg) by mouth daily  Qty: 45 tablet, Refills: 1    Comments: Dose decrease  Associated Diagnoses: Acute on chronic diastolic heart failure (H)      Vitamin D (Cholecalciferol) 10 MCG (400 UNIT) TABS Take 1 tablet by mouth daily       !! - Potential duplicate medications found. Please discuss with provider.      STOP taking these medications       torsemide (DEMADEX) 20 MG tablet Comments:   Reason for Stopping:                CC:Gala Roberto      [facility]   Cardiothoracic Surgery  Office phone: [phone]  Office fax: [phone]

## 2023-04-17 NOTE — PLAN OF CARE
"  NURSING PROGRESS NOTE  Shift Summary      Date: April 17, 2023     Neuro/Musculoskeletal:  A&Ox4.   Cardiac:  Cont Afib.  VSS.    Respiratory:  Sating in the 90s on RA.  GI/:  Adequate urine output.  LBM: BM X 3 tonight.  Diet/Appetite:  Tolerating regular diet.  Activity:  Assist of 1  Pain:  Reported arthritic pain and slightly to her incision, Tylenol was effective for pain control.  Skin:  No new deficits noted.  LDAs + Drips/IVF: R wrist PIV SL  Protocols/Labs:  K+, Phos & Mg    Pertinent Shift Updates:  None      Plan:  Continue POC.      Christine Villalobos RN  .................................................... April 17, 2023   6:13 AM  [facility] ([facility] [place]  [facility]Unit [facility]    Goal Outcome Evaluation:      Plan of Care Reviewed With: patient    Overall Patient Progress: improvingOverall Patient Progress: improving    Outcome Evaluation: Patient is A/O X4, pain is controlled only requested Tylenol x 2 for \"athritis pain\" which was effective. CT & incisions' site were CDI. She is voiding, and eating adequately.      "

## 2023-04-17 NOTE — PROGRESS NOTES
"Pain Service Progress Note  [facility]  Date: 04/16/2023       Patient Name: Amelia Michel  MRN: [id number]  Age: 62 year old  Sex: adult      Assessment:  Amelia Michel is a 62 year old female with PMH significant for cardiac arrest (2017) NSVT s/p PVC ablation (11/22), HFpEF, mildly reduced RV function, afib/aflutter, CKD III, severe tricuspid regurgitation    Procedure: s/p mini thoracotomy and TVR (using 29 mm porcine valve)    Date of Surgery: 4/10/23     Date of Catheter Placement: 4/10/23     Plan/Recommendations:  1. Regional Anesthesia/Analgesia  -Continuous Catheter Type/Site: right erector spinae (ES) T5-6  Infusate: ropivacaine 0.2%  Programmed Intermittent Bolus (PIB) at 10mL Q60 min    1000 Cinician administered nerve block bolus. VSS, MAP 60s.  PF BUPivacaine 0.25%, total bolus 10 mL via right catheter, administered without complication, negative aspirate before and during administration.  No symptoms of local anesthetic systemic toxicity (LAST). Remained with and assessed patient for 10 min post-injection. BP, P, and MAP stable.  Bedside RN aware of need to continue to monitoring and document BP, P, and MAP Q 10 min for an additional 20 min. Contact Pain Service if any of the following: patient experiencing any untoward effects, SBP < 90, P < 50 or > 120, MAP < 60    4/16 18:00: Right COOPER catheter removed without issues, no bleeding, site clean.     2. Anticoagulation  -Please contact Inpatient Pain Service before ordering or making any anticoagulation changes       3. Multimodal Analgesia  - per primary service    Pain Service will continue to follow.      Discussed with attending anesthesiologist    Ramon Doss MD, CA-3  04/16/2023     Overnight Events: No acute events       Subjective:  I would like a bolus.\" Amelia reports good pain relief with q 12 hr bolus therefore requests bolus this AM for nonopioid management. Also on scheduled Tylenol and has received " "Dilaudid IV 0.2 mg x 2 doses  Nausea: No  Symptoms of LAST: No    Pain Location:  Chest tube pain    Pain Intensity:    Pain at Rest: 0/10   Pain with Activity: 0/10  Satisfied with your level of pain control: Yes    Diet: Advance Diet as Tolerated: Regular Diet Adult; Regular Diet Adult; 2 gm NA Diet    Relevant Labs:  Recent Labs   Lab Test 04/13/23  0314 04/11/23  0350 04/10/23  1327   INR  --   --  1.71*   PLT 85*   < > 76*   PTT  --   --  61*   BUN 35.0*   < > 24.7*    < > = values in this interval not displayed.       Physical Exam:  Vitals: /61 (BP Location: Right arm)   Pulse 75   Temp 37.3  C (99.1  F) (Oral)   Resp 13   Ht 1.448 m (4' 9\")   Wt 47.5 kg (104 lb 11.5 oz)   SpO2 98%   BMI 22.66 kg/m      Physical Exam:   Orientation:  Alert, oriented, and in no acute distress: Yes  Sedation: No    Motor Examination:  5/5 Strength in lower extremities: Yes    Catheter Site:   Catheter entry site is clean/dry/intact: Yes    Tender: No      Relevant Medications:  Current Pain Medications:  Medications related to Pain Management (From now, onward)    Start     Dose/Rate Route Frequency Ordered Stop    04/13/23 0000  acetaminophen (TYLENOL) tablet 650 mg         650 mg Oral EVERY 4 HOURS PRN 04/10/23 1309      04/11/23 0800  polyethylene glycol (MIRALAX) Packet 17 g         17 g Oral DAILY 04/10/23 1309      04/11/23 0800  aspirin (ASA) chewable tablet 81 mg         81 mg Oral or NG Tube DAILY 04/10/23 1309      04/10/23 2000  senna-docusate (SENOKOT-S/PERICOLACE) 8.6-50 MG per tablet 1 tablet         1 tablet Oral 2 TIMES DAILY 04/10/23 1309      04/10/23 1309  magnesium hydroxide (MILK OF MAGNESIA) suspension 30 mL         30 mL Oral DAILY PRN 04/10/23 1309      04/10/23 1309  bisacodyl (DULCOLAX) suppository 10 mg         10 mg Rectal DAILY PRN 04/10/23 1309      04/10/23 1309  HYDROmorphone (DILAUDID) injection 0.2 mg        See Hyperspace for full Linked Orders Report.    0.2 mg Intravenous EVERY " "2 HOURS PRN 04/10/23 1309      04/10/23 1309  HYDROmorphone (DILAUDID) injection 0.4 mg        See Hyperspace for full Linked Orders Report.    0.4 mg Intravenous EVERY 2 HOURS PRN 04/10/23 1309      04/10/23 1309  oxyCODONE (ROXICODONE) tablet 5 mg        See Hyperspace for full Linked Orders Report.    5 mg Oral EVERY 4 HOURS PRN 04/10/23 1309      04/10/23 1309  oxyCODONE IR (ROXICODONE) tablet 10 mg        See Hyperspace for full Linked Orders Report.    10 mg Oral EVERY 4 HOURS PRN 04/10/23 1309      04/10/23 1309  lidocaine 1 % 0.1-1 mL         0.1-1 mL Other EVERY 1 HOUR PRN 04/10/23 1309      04/10/23 1309  lidocaine (LMX4) cream          Topical EVERY 1 HOUR PRN 04/10/23 1309      04/10/23 1030  ropivacaine 0.2% in NS perineural infusion (simple)          Perineural Continuous Nerve Block 04/10/23 1023            Primary Service Contacted with Recommendations? No      Please see A&P for additional details of medical decision making.      Acute Inpatient Pain Service [facility]  Hours of pain coverage 24/7   Page via [employer]- Please Page the Pain Team Via [employer]: \"PAIN MANAGEMENT ACUTE INPATIENT/ [facility]\"             "

## 2023-04-17 NOTE — PLAN OF CARE
Discharged to: Home at approx 1615  Belongings: Sent with patient  AVS (After Visit Summary) discussed with: patient     A&O x4. Pupils equal and reactive; wears glasses at baseline. Pain treated with PRN tylenol x1. Assist x1 with walker and gait belt. VSS on RA. Reg diet with good appetite. BM in bedside commode this AM. R groin incision (liquid bandage, no drainage), R armpit incision (liquid bandage, no drainage). Previous CT sites on chest covered w/ min drainage. R PIV removed before discharge. Transport stopping at discharge pharmacy on way out of hospital. AVS discussed with patient and .

## 2023-04-17 NOTE — DISCHARGE INSTRUCTIONS
AFTER YOU GO HOME FROM YOUR HEART SURGERY  (Mini-sternotomy tricuspid valve replacement, Repair of right femoral vessels with Dr. Cope on 04/10/2023)    You had a mini-sternotomy, avoid lifting anything greater than 10-20 pounds for 2-3 weeks after surgery.  Do not reach backwards or use arms to push out of chair.   Do not let people pull on your arms to assist with standing.   Avoid twisting or reaching too far across your body.    Avoid strenuous activities such as bowling, vacuuming, raking, shoveling, golf or tennis for 2-4 weeks after your surgery.   It is okay to resume sex if you feel comfortable in doing so. You may have to try different positions with your partner.    Splint your chest incision by hugging a pillow or bringing your arms across your chest when coughing or sneezing.     No driving for 2 weeks after surgery or while on pain medication.    Shower or wash your incisions daily with soap and water (or as instructed), pat dry.   Keep wound clean and dry, showers are okay after discharge, but don't let spray hit directly on incision.   No baths or swimming for 1 month.   Cover chest tube sites with dry gauze until they stop draining, then leave open to air. It is not abnormal for chest tube sites to drain yellowish/clear fluid for up to 2-3 weeks after surgery.   Watch for signs of infection: increased redness, tenderness, warmth or any drainage that appears infected (pus like) or is persistent.  Also a temperature > 100.5 F or chills. Call your surgeon or primary care provider's office immediately.   Remove any skin glue left on incisions after 10-14 days. This will not affect your incision and can speed up healing.    Exercise is very important in your recovery. Please follow the guidelines set up for you in your cardiac rehab classes at the hospital. If outpatient cardiac rehab was ordered for you, we highly recommend you participate. If you have problems arranging your cardiac rehab,  please call [phone] for all locations, with the exception of [place], please call [phone] and [facility], please call [phone].    Avoid sitting for prolonged periods of time, try to walk every hour during the day. If you have a leg incision, elevate your leg often when you are not walking.    Check your weight when you get home from the hospital and continue to check it daily through your recovery for at least a month. If you notice a weight gain of 2-3 pounds in a week, notify your primary care physician, cardiologist or surgeon.    Bowel activity may be slow after surgery. If necessary, you may take an over the counter laxative such as Milk of Magnesia or Miralax. You may have stool softeners prescribed (docusate sodium, Senokot). We recommend using stool softeners while using narcotics for pain (oxycodone/percocet, hydrocodone/vicodin, hydromorphone/dilaudid).      Wean OFF of narcotics (oxycodone, dilaudid, hydrocodone) as soon as possible. You should continue taking acetaminophen as long as you have any surgical pain as the first choice for pain control and add narcotics as necessary for pain to be tolerable.      DENTAL VISITS AFTER SURGERY  If you have had your heart valve repaired or replaced, we do not recommend having any dental work done for 6 months and you will need to take an antibiotic prior to dental visits from now on.  Please notify your dentist before any procedure for the proper treatment needed. The antibiotic is taken by mouth one hour prior to visit. This includes routine cleanings.    DO NOT SMOKE.  IF YOU NEED HELP QUITTING, PLEASE TALK WITH YOUR CARDIOLOGIST OR PRIMARY DOCTOR.    REGARDING PRESCRIPTION REFILLS.  If you need a refill on your pain medication contact us to discuss your pain and a possible one time refill.   All other medications will be adjusted, discontinued and re-filled by your primary care physician and/or your cardiologist as they were prior to your  surgery. We have given you enough for one to three month with possibly one refill.    POST-OPERATIVE CLINIC VISITS  You have a follow up visit with CVTS Surgery Advance Care Practitioners at the [facility].  You will then return to the care of your primary provider and your cardiologist. Future medication refills should come from your PCP or Cardiologist.   You should see your primary care provider in 2-4 weeks after discharge.   It is important to see your cardiologist about 4-6 weeks after discharge.    If you do not hear from a  in 7 days, please call [phone] (choose option 1) and request to be seen with a general cardiologist or someone that you have seen in the past.   If there is a need to return to see CT Surgery please call our  at [phone].    SURGICAL QUESTIONS  Please call Maykel Tipton, Bre Jacome, Mar Paris, Lorie Molina or Eli Soares with surgical recovery and medication questions, their phone numbers are listed below.  They will assist you with your needs and contact other surgery care team members as indicated.    On weekends or after hours, please call [phone] and ask the  to page the Cardiothoracic Surgery fellow on call.      Thank you,    Your Cardiothoracic Surgery Team   Maykel Tipton RN Care Coordinator - [phone]  Bre Jacome RN Care Coordinator - [phone]   Eil Soares, RN Care Coordinator - [phone]   Lorie Molina RN Care Coordinator - [phone]  Mar Paris, ABBEY Care Coordinator - [phone]

## 2023-04-17 NOTE — PLAN OF CARE
Physical Therapy Discharge Summary    Reason for therapy discharge:    Discharged to home with outpatient therapy.    Progress towards therapy goal(s). See goals on Care Plan in [facility] electronic health record for goal details.  Goals partially met.  Barriers to achieving goals:   discharge from facility.    Therapy recommendation(s):    Continued therapy is recommended.  Rationale/Recommendations:  for continued strengthening, endurance training.

## 2023-04-17 NOTE — PLAN OF CARE
Occupational Therapy Discharge Summary    Reason for therapy discharge:    Discharged to home with outpatient therapy.    Progress towards therapy goal(s). See goals on Care Plan in [facility] electronic health record for goal details.  Goals partially met.  Barriers to achieving goals:   discharge from facility.    Therapy recommendation(s):    Continued therapy is recommended.  Rationale/Recommendations:  OP CR to increase functional endurance.

## 2023-04-19 ENCOUNTER — PATIENT OUTREACH (OUTPATIENT)
Dept: CARE COORDINATION | Facility: CLINIC | Age: 63
End: 2023-04-19

## 2023-04-19 ENCOUNTER — TELEPHONE (OUTPATIENT)
Dept: CARDIOLOGY | Facility: CLINIC | Age: 63
End: 2023-04-19

## 2023-04-19 NOTE — TELEPHONE ENCOUNTER
CARDIOTHORACIC SURGERY  Discharge Follow Up Phone Call    POST OP MONITORING  How is your pain on a 0-10 scale, how are you managing your pain? 5/10, some of it is chronic arthritis pain. Only taking tylenol and robaxin     ACTIVITY  How is your activity tolerance? Walking around   Are you still doing sternal precautions? yes  Are you using your incentive spirometer? Yes  Are you weighing yourself daily? 105.3 was 105 when she got home.    SIGNS AND SYMPTOMS OF INFECTION  1. INCREASE IN PAIN - No  2. FEVER - no  3. DRAINAGE - no   4. REDNESS - no  5. SWELLING - no    ASSISTANCE  Do you have someone at home to assist you with your daily activities? Yes    MEDICATIONS  Is someone helping you to set up your medications? Yes  Do you have any questions about your medications? no    Are you on a blood thinner? Yes     FOLLOW UP  You are scheduled to see your primary care physician on 4/26.  You are scheduled to see our surgery advanced practive provider for post operative follow up on 5/2.    You are scheduled for cardiac rehab on 5/4.  You are scheduled to see your cardiologist on 5/24.    CONTACT INFORMATION  Please feel free to call us with any questions or symptoms that are concerning for you at [phone]. If it is after 4:30 in the afternoon, or a weekend please call [phone] and ask for the on call specialist. We want to do everything we can to help prevent you needing to return to the ED, so please do not hesitate to call us.    Bre Jacome, RNCC  Cardiothoracic Surgery  [phone]

## 2023-04-19 NOTE — PROGRESS NOTES
Clinic Care Coordination Contact  [facility]: Post-Discharge Note  SITUATION                                                      Admission:    Admission Date: 04/10/23   Reason for Admission: Severe tricuspid regurgitation  Discharge:   Discharge Date: 04/17/23  Discharge Diagnosis: Severe tricuspid insufficiency, s/p tricuspid valve replacement    BACKGROUND                                                      Per hospital discharge summary and inpatient provider notes:    BRIEF HISTORY OF ILLNESS:  AMELIA MIDDLETON is a 62 year old adult admitted with severe tricuspid insufficiency.  We were asked to evaluate for surgical repair / replacement.  Risks and benefits of the operation were explained to the patient and their family including, but not limited to, bleeding, infection, stroke and even death.  They understood these risks and agreed to proceed electively.     HOSPITAL COURSE: Amelia Middleton is a 62 year old adult who on 4/10/2023 underwent the above-named procedures and tolerated the operation well.      Postoperatively was admitted to the CVICU.  Patient was extubated within protocol on POD #1.  Blood pressure and cardiac index were managed with vasopressors and inotropic agents which were continuously weaned until no longer needed.  Patient was subsequently  transferred to the surgical telemetry floor.     While on the surgical unit, the patient continued to progress well. Chest tubes and temporary pacemaker wires were removed when deemed appropriate.     Patient was fluid overloaded and treated with diuretics. They are down about 6lbs from preoperative weight and will discharge with no additional days of diuretic therapy but she has a history of hypervolemia and has PRNs available and knows what to watch for with her weight; will re-evaluate need in clinic follow-up.      Patient was transiently hyperglycemic and treated with insulin infusion then transitioned to sliding scale insulin per  "protocol. Blood sugars remained stable. No further glycemic control agents needed at this time.     Prior to discharge, Amelia Michel's pain was controlled well, Amelia Michel was working well with therapies, able to perform most ADLs, ambulate without difficulty, and had full return of bowel and bladder function.  On April 17, 2023, Amelia Michel was discharged to home in stable condition. Follow up with cardiology and cardiac surgery have been arranged. Pt encouraged to follow up with PCP and cardiac rehab upon discharge.    ASSESSMENT           Discharge Assessment  How are you doing now that you are home?: Pt states only concern is she has lymphedema with her cancer, but it appears the R leg where the femeral incision is having the lymphedema issue, \"my entire R leg is bigger than the L leg.\"  Wearing lymphedema socks since d/c.  States doesn't feel it's CHF as her weight is only up 0.3#, not a significant weight gain; home 104.7# on d/c and 105.2# today.  Questions if possible DVT vs lymphedema.  Denies confirmed hx of DVT/PE, but did have a suspected DVT in her arm from the PICC line during cancer tx, but again this was not confirmed and PICC line was removed upon suspicion.  Groin incision is about 3 inches long, closed with internal stitches.  Unsure if leg swelling is due to incision but she's noticed the leg is more swollen now compared to when she came home, bending knee more difficult and moving slower due to swelling and knee.   states he measured around Amelia's R leg and the swelling is \"2-3cm bigger on the R thigh and calf compared to the L leg.\"  Denies chest pain, shortness of breath, or pain or redness in the back of the calf.  Lying down with leg elevated, otherwise moving around slower because movement around the knee is worse.  Chest incision on side is \"more sore and tender, but feel it's because of using my arms to get up out of bed.\"  2 of the 3 chest tube sites are " "bleeding \"using a 2x2 guaze and change it every 4 hours due to drainage but it's a small amount, I change it just because it's there,\" thinking due to blood thinner.  Denies fevers.  Taking medications as prescribed.  Has not taken oxycodone but taking scheduled Tylenol and methocarbamol for pain with good relief.  RN informed some swelling in her surgical leg is expected, however, unsure as to the amount of swelling that would be considered normal post surgery, and encouraged pt contact her CV Surgery team.  Pt agrees with plan and will call CV RN CC after call with this RN.  How are your symptoms? (Red Flag symptoms escalate to triage hotline per guidelines): Unchanged  Do you feel your condition is stable enough to be safe at home until your provider visit?: Yes  Does the patient have their discharge instructions? : Yes  Does the patient have questions regarding their discharge instructions? : No  Were you started on any new medications or were there changes to any of your previous medications? : Yes  Does the patient have all of their medications?: Yes  Do you have questions regarding any of your medications? : No  Do you have all of your needed medical supplies or equipment (DME)?  (i.e. oxygen tank, CPAP, cane, etc.): Yes  Discharge follow-up appointment scheduled within 14 calendar days? : Yes  Discharge Follow Up Appointment Date: 04/26/23  Discharge Follow Up Appointment Scheduled with?: Primary Care Provider (PCP 4/26/23, CV Surgery 5/2/23, Cardiac rehab 5/4/23, CORE clinic 5/24/23)         Post-op (Clinicians Only)  Did the patient have surgery or a procedure: Yes (MINIMALLY INVASIVE TRICUSPID VALVE REPLACEMENT USING 29MM [employer]. [place]IL[place] EPIC VALVE, FEMORAL CANNULATION AND RIGHT GROIN CUTDOWN, CARDIOPULMONARY BYPASS, TRANSESOPHAGEAL ECHOCARDIOGRAM PERFORMED BY ANESTHESIA STAFF)  Incision: closed;healing  Drainage: Yes  Drainage Color: serosanguinous  Incision Drainage Amount: light (2 of 3 chest tube sites, " "\"pink tinged.\")  Bleeding: light  Fever: No  Chills: No  Redness: No  Warmth: No  Swelling: No  Incision site pain: No  Eating & Drinking: eating and drinking without complaints/concerns  Additional Symptoms:  (Pt denies)  Bowel Function: loose stools (\"I have mushy stools normally.\")  Date of last BM: 04/19/23  Urinary Status: voiding without complaint/concerns      PLAN                                                      Outpatient Plan:      POST-OPERATIVE CLINIC VISITS  You have a follow up visit with CVTS Surgery Advance Care Practitioners at the [facility]. You  will then return to the care of your primary provider and your cardiologist. Future medication refills should come from  your PCP or Cardiologist.  You should see your primary care provider in 2-4 weeks after discharge.  It is important to see your cardiologist about 4-6 weeks after discharge.  If you do not hear from a  in 7 days, please call [phone] (choose option 1) and request to be seen with a general cardiologist or someone that you have seen in the past.  If there is a need to return to see CT Surgery please call our  at [phone].    Future Appointments   Date Time Provider Department Center   4/26/2023 10:30 AM Gala Stringer PA-C [facility]   5/2/2023 12:00 AM UC ICD REMOTE UCCVSV [facility]   5/2/2023  2:30 PM UC CVTS UCCTS [facility]   5/4/2023  1:30 PM [facility]   5/24/2023  1:00 PM UCECHCR2 UCCVCV [facility]   5/24/2023  2:15 PM  LAB UCLABR [facility]   5/24/2023  2:30 PM [facility]   7/3/2023  7:00 AM  CVC MONITOR TECH UCCVSV [facility]   8/8/2023 10:30 AM UC CV DEVICE 1 UCCVCV [facility]   8/8/2023 11:00 AM Juan Eaton MD UCCVC [facility]   9/14/2023 12:30 PM UC MASONIC LAB DRAW UCONL [facility]   9/14/2023  1:00 PM Zuleima Lin APRN [facility]   9/15/2023 11:30 AM [facility]   9/21/2023 11:00 AM Maribell Domínguez, " MD AREVALO [facility]   11/8/2023 12:00 AM  ICD REMOTE UCCVSV [facility]   12/20/2023 10:00 AM  MASONIC LAB DRAW UCONL [facility]   12/20/2023 10:30 AM Thai Heredia MD [facility]         For any urgent concerns, please contact our 24 hour nurse triage line: [phone] ([phone])         Masha Wang RN

## 2023-04-21 ENCOUNTER — TELEPHONE (OUTPATIENT)
Dept: CARDIOLOGY | Facility: CLINIC | Age: 63
End: 2023-04-21
Payer: COMMERCIAL

## 2023-04-21 ENCOUNTER — LAB (OUTPATIENT)
Dept: LAB | Facility: CLINIC | Age: 63
End: 2023-04-21
Payer: COMMERCIAL

## 2023-04-21 DIAGNOSIS — R60.9 EDEMA, UNSPECIFIED TYPE: ICD-10-CM

## 2023-04-21 DIAGNOSIS — R60.9 EDEMA, UNSPECIFIED TYPE: Primary | ICD-10-CM

## 2023-04-21 LAB
ANION GAP SERPL CALCULATED.3IONS-SCNC: 16 MMOL/L (ref 7–15)
BUN SERPL-MCNC: 18.3 MG/DL (ref 8–23)
CALCIUM SERPL-MCNC: 9.6 MG/DL (ref 8.8–10.2)
CHLORIDE SERPL-SCNC: 92 MMOL/L (ref 98–107)
CREAT SERPL-MCNC: 0.89 MG/DL (ref 0.51–1.17)
DEPRECATED HCO3 PLAS-SCNC: 26 MMOL/L (ref 22–29)
GFR SERPL CREATININE-BSD FRML MDRD: 73 ML/MIN/1.73M2
GLUCOSE SERPL-MCNC: 143 MG/DL (ref 70–99)
POTASSIUM SERPL-SCNC: 2.8 MMOL/L (ref 3.4–5.3)
SODIUM SERPL-SCNC: 134 MMOL/L (ref 136–145)

## 2023-04-21 PROCEDURE — 80048 BASIC METABOLIC PNL TOTAL CA: CPT

## 2023-04-21 PROCEDURE — 36415 COLL VENOUS BLD VENIPUNCTURE: CPT

## 2023-04-21 NOTE — TELEPHONE ENCOUNTER
Patient called regarding weight gain yesterday and restarting once daily torsemide. Weight much improved and patient feeling better. Verified with KEVIN team that she was OK to continue taking. [facility] ordered to monitor K+ levels as patient does not have K+ tablets ordered.

## 2023-04-24 ENCOUNTER — TELEPHONE (OUTPATIENT)
Dept: CARDIOLOGY | Facility: CLINIC | Age: 63
End: 2023-04-24
Payer: COMMERCIAL

## 2023-04-24 NOTE — TELEPHONE ENCOUNTER
Spoke with patient regarding K+ levels. Patient spoke to service over the weekend and took 60 meq of K+ once and is taking 40 meq daily with diuretic. Will have labs redrawn with PCP on 4/26.

## 2023-04-26 ENCOUNTER — OFFICE VISIT (OUTPATIENT)
Dept: FAMILY MEDICINE | Facility: CLINIC | Age: 63
End: 2023-04-26
Payer: COMMERCIAL

## 2023-04-26 VITALS
SYSTOLIC BLOOD PRESSURE: 116 MMHG | HEART RATE: 77 BPM | OXYGEN SATURATION: 99 % | RESPIRATION RATE: 17 BRPM | HEIGHT: 57 IN | DIASTOLIC BLOOD PRESSURE: 75 MMHG | WEIGHT: 104.6 LBS | TEMPERATURE: 98 F | BODY MASS INDEX: 22.57 KG/M2

## 2023-04-26 DIAGNOSIS — Z95.4 S/P TVR (TRICUSPID VALVE REPLACEMENT): Primary | ICD-10-CM

## 2023-04-26 DIAGNOSIS — D69.6 THROMBOCYTOPENIA, UNSPECIFIED (H): ICD-10-CM

## 2023-04-26 DIAGNOSIS — I50.33 ACUTE ON CHRONIC DIASTOLIC HEART FAILURE (H): ICD-10-CM

## 2023-04-26 DIAGNOSIS — I07.1 SEVERE TRICUSPID REGURGITATION: ICD-10-CM

## 2023-04-26 DIAGNOSIS — I50.31 ACUTE HEART FAILURE WITH PRESERVED EJECTION FRACTION (HFPEF) (H): ICD-10-CM

## 2023-04-26 DIAGNOSIS — I50.9 CHRONIC CONGESTIVE HEART FAILURE, UNSPECIFIED HEART FAILURE TYPE (H): ICD-10-CM

## 2023-04-26 PROBLEM — Z98.890 S/P TVR (TRICUSPID VALVE REPAIR): Status: ACTIVE | Noted: 2023-04-26

## 2023-04-26 LAB
ANION GAP SERPL CALCULATED.3IONS-SCNC: 12 MMOL/L (ref 7–15)
BASOPHILS # BLD AUTO: 0.1 10E3/UL (ref 0–0.2)
BASOPHILS NFR BLD AUTO: 1 %
BUN SERPL-MCNC: 26 MG/DL (ref 8–23)
CALCIUM SERPL-MCNC: 9.5 MG/DL (ref 8.8–10.2)
CHLORIDE SERPL-SCNC: 92 MMOL/L (ref 98–107)
CREAT SERPL-MCNC: 0.93 MG/DL (ref 0.51–1.17)
DEPRECATED HCO3 PLAS-SCNC: 30 MMOL/L (ref 22–29)
EOSINOPHIL # BLD AUTO: 0.1 10E3/UL (ref 0–0.7)
EOSINOPHIL NFR BLD AUTO: 1 %
ERYTHROCYTE [DISTWIDTH] IN BLOOD BY AUTOMATED COUNT: 14.8 % (ref 10–15)
GFR SERPL CREATININE-BSD FRML MDRD: 69 ML/MIN/1.73M2
GLUCOSE SERPL-MCNC: 96 MG/DL (ref 70–99)
HCT VFR BLD AUTO: 31.1 % (ref 35–53)
HGB BLD-MCNC: 10.1 G/DL (ref 11.7–17.7)
LYMPHOCYTES # BLD AUTO: 0.5 10E3/UL (ref 0.8–5.3)
LYMPHOCYTES NFR BLD AUTO: 6 %
MCH RBC QN AUTO: 36.2 PG (ref 26.5–33)
MCHC RBC AUTO-ENTMCNC: 32.5 G/DL (ref 31.5–36.5)
MCV RBC AUTO: 112 FL (ref 78–100)
MONOCYTES # BLD AUTO: 0.9 10E3/UL (ref 0–1.3)
MONOCYTES NFR BLD AUTO: 11 %
NEUTROPHILS # BLD AUTO: 6.9 10E3/UL (ref 1.6–8.3)
NEUTROPHILS NFR BLD AUTO: 82 %
PLATELET # BLD AUTO: 197 10E3/UL (ref 150–450)
POTASSIUM SERPL-SCNC: 3.7 MMOL/L (ref 3.4–5.3)
RBC # BLD AUTO: 2.79 10E6/UL (ref 3.8–5.9)
SODIUM SERPL-SCNC: 134 MMOL/L (ref 136–145)
WBC # BLD AUTO: 8.5 10E3/UL (ref 4–11)

## 2023-04-26 PROCEDURE — 85025 COMPLETE CBC W/AUTO DIFF WBC: CPT | Performed by: PHYSICIAN ASSISTANT

## 2023-04-26 PROCEDURE — 99495 TRANSJ CARE MGMT MOD F2F 14D: CPT | Performed by: PHYSICIAN ASSISTANT

## 2023-04-26 PROCEDURE — 80048 BASIC METABOLIC PNL TOTAL CA: CPT | Performed by: PHYSICIAN ASSISTANT

## 2023-04-26 PROCEDURE — 36415 COLL VENOUS BLD VENIPUNCTURE: CPT | Performed by: PHYSICIAN ASSISTANT

## 2023-04-26 RX ORDER — METHOCARBAMOL 500 MG/1
500 TABLET, FILM COATED ORAL 3 TIMES DAILY
Qty: 90 TABLET | Refills: 0 | Status: SHIPPED | OUTPATIENT
Start: 2023-04-26 | End: 2023-08-08

## 2023-04-26 NOTE — PROGRESS NOTES
Assessment & Plan     ASSESSMENT/PLAN:      ICD-10-CM    1. S/P TVR (tricuspid valve replacement)  Z95.4 Basic metabolic panel  (Ca, Cl, CO2, Creat, Gluc, K, Na, BUN)     Basic metabolic panel  (Ca, Cl, CO2, Creat, Gluc, K, Na, BUN)     CBC with platelets and differential     CBC with platelets and differential     methocarbamol (ROBAXIN) 500 MG tablet     Basic metabolic panel  (Ca, Cl, CO2, Creat, Gluc, K, Na, BUN)      2. Severe tricuspid regurgitation  I07.1 methocarbamol (ROBAXIN) 500 MG tablet      3. Thrombocytopenia, unspecified (H)  D69.6       4. Chronic congestive heart failure, unspecified heart failure type (H)  I50.9 CBC with platelets and differential     CBC with platelets and differential      5. Acute heart failure with preserved ejection fraction (HFpEF) (H)  I50.31 Basic metabolic panel  (Ca, Cl, CO2, Creat, Gluc, K, Na, BUN)     Basic metabolic panel  (Ca, Cl, CO2, Creat, Gluc, K, Na, BUN)      6. Acute on chronic diastolic heart failure (H)  I50.33 Basic metabolic panel  (Ca, Cl, CO2, Creat, Gluc, K, Na, BUN)     Basic metabolic panel  (Ca, Cl, CO2, Creat, Gluc, K, Na, BUN)        Checking labs today. See below how patient has been taking torsemide/potassium. She is healing, improving. Has appointments with cardiac surgeon next week and cardiology team 5/24/23. She is monitoring symptoms and home weights. She will discuss with cardiology team restarting spironolactone in the future.     MED REC REQUIRED  Post Medication Reconciliation Status: discharge medications reconciled, continue medications without change    CORI Oliva [facility] ELY Cisneros is a 62 year old, presenting for the following health issues:  Hospital F/U      HPI         4/19/2023     2:41 PM   Post Discharge Outreach   Admission Date 4/10/2023   Reason for Admission Severe tricuspid regurgitation   Discharge Date 4/17/2023   Discharge Diagnosis Severe tricuspid insufficiency, s/p  "tricuspid valve replacement   How are you doing now that you are home? Pt states only concern is she has lymphedema with her cancer, but it appears the R leg where the femeral incision is having the lymphedema issue, \"my entire R leg is bigger than the L leg.\"  Wearing lymphedema socks since d/c.  States doesn't feel it's CHF as her weight is only up 0.3#, not a significant weight gain; home 104.7# on d/c and 105.2# today.  Questions if possible DVT vs lymphedema.  Denies confirmed hx of DVT/PE, but did have a suspected DVT in her arm from the PICC line during cancer tx, but again this was not confirmed and PICC line was removed upon suspicion.  Groin incision is about 3 inches long, closed with internal stitches.  Unsure if leg swelling is due to incision but she's noticed the leg is more swollen now compared to when she came home, bending knee more difficult and moving slower due to swelling and knee.   states he measured around Amelia's R leg and the swelling is \"2-3cm bigger on the R thigh and calf compared to the L leg.\"  Denies chest pain, shortness of breath, or pain or redness in the back of the calf.  Lying down with leg elevated, otherwise moving around slower because movement around the knee is worse.  Chest incision on side is \"more sore and tender, but feel it's because of using my arms to get up out of bed.\"  2 of the 3 chest tube sites are bleeding \"using a 2x2 guaze and change it every 4 hours due to drainage but it's a small amount, I change it just because it's there,\" thinking due to blood thinner.  Denies fevers.  Taking medications as prescribed.  Has not taken oxycodone but taking scheduled Tylenol and methocarbamol for pain with good relief.  RN informed some swelling in her surgical leg is expected, however, unsure as to the amount of swelling that would be considered normal post surgery, and encouraged pt contact her CV Surgery team.  Pt agrees with plan and will call CV RN CC after " call with this RN.   How are your symptoms? (Red Flag symptoms escalate to triage hotline per guidelines) Unchanged   Do you feel your condition is stable enough to be safe at home until your provider visit? Yes   Does the patient have their discharge instructions?  Yes   Does the patient have questions regarding their discharge instructions?  No   Were you started on any new medications or were there changes to any of your previous medications?  Yes   Does the patient have all of their medications? Yes   Do you have questions regarding any of your medications?  No   Do you have all of your needed medical supplies or equipment (DME)?  (i.e. oxygen tank, CPAP, cane, etc.) Yes   Discharge follow-up appointment scheduled within 14 calendar days?  Yes   Discharge Follow Up Appointment Date 4/26/2023   Discharge Follow Up Appointment Scheduled with? Primary Care Provider     Hospital Follow-up Visit:    Hospital/Nursing Home/IP Rehab Facility: [facility]  Date of Admission: 4/10/2023  Date of Discharge: 4/17/2023  Reason(s) for Admission: Severe Tricuspid regurgitation    Was your hospitalization related to COVID-19? No   Problems taking medications regularly:  None  Medication changes since discharge: None  Problems adhering to non-medication therapy:  None    Summary of hospitalization:  [facility] discharge summary reviewed  Diagnostic Tests/Treatments reviewed.  Follow up needed: labs  Other Healthcare Providers Involved in Patient s Care:         Specialist appointment - cardiology 5/2  Update since discharge: improved. 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  Tricuspid valve replacement (per patient replaced actually with a mitral valve due to how enlarged the tricuspid was)    Home weight stable at 103lb. Over 1 week ago weight increased to 106lb and right leg was swollen. Started 20 mg torsemide and lost 3.5 lb.   Torsemide: taking 20 mg once daily  4/21 had low potassium, Started potassium this weekend: Saturday took 60 meq, Sunday/Mon/tuesday 40 meq, today 20 meq  Prior to the hospital she had spironolactone so potassium was okay, this was held in/since the hospitalization    She has been speaking with her care coordinator  Bre Jacome RNCC  Cardiothoracic Surgery  [phone]    Drinking fluids, eating at baseline (low appetite chronically since chemo)  Normal BMs, normal breathing. Has some allergy symptoms with postnasal drip. No fevers.  Pain from incisions/surgery is improving but she would like a refill on the methocarbamol, taking two times daily some days and helps. Her right shoulder pain has come back since surgery.    Review of Systems   Other than noted above, general, HEENT, respiratory, cardiac, MS, and gastrointestinal systems are negative.       Objective    /75   Pulse 77   Temp 98  F (36.7  C) (Tympanic)   Resp 17   Ht 1.448 m (4' 9.01\")   Wt 47.4 kg (104 lb 9.6 oz)   SpO2 99%   BMI 22.63 kg/m    Body mass index is 22.63 kg/m .  Physical Exam   GENERAL: healthy, alert and no distress  RESP: lungs clear to auscultation - no rales, rhonchi or wheezes  CV: regular rate and rhythm, normal S1 S2, no S3 or S4, 3/6 systolic murmur throughout, click or rub, no peripheral edema and peripheral pulses strong  ABDOMEN: soft, nontender, no hepatosplenomegaly, no masses and bowel sounds normal  MS: no gross musculoskeletal defects noted, no edema  SKIN: evaluated incision sites. Abdominal small incisions scabbed healing nicely. Per patient occasional slight " oozing onto bandage but none seen today. Right groin healing with no signs of swelling or infection or abscess. Right rib/axilla long incision healing with no erythema or signs of abscess or abnormal tenderness. There is an area of swelling proximally per patient unchanged since she left the hospital and with no increased tenderness or erythema, no discharge.    Results for orders placed or performed in visit on 04/26/23   Basic metabolic panel  (Ca, Cl, CO2, Creat, Gluc, K, Na, BUN)     Status: Abnormal   Result Value Ref Range    Sodium 134 (L) 136 - 145 mmol/L    Potassium 3.7 3.4 - 5.3 mmol/L    Chloride 92 (L) 98 - 107 mmol/L    Carbon Dioxide (CO2) 30 (H) 22 - 29 mmol/L    Anion Gap 12 7 - 15 mmol/L    Urea Nitrogen 26.0 (H) 8.0 - 23.0 mg/dL    Creatinine 0.93 0.51 - 1.17 mg/dL    Calcium 9.5 8.8 - 10.2 mg/dL    Glucose 96 70 - 99 mg/dL    GFR Estimate 69 >60 mL/min/1.73m2    Narrative    The sex of this patient cannot be reliably determined based on discrepancies in demographics (legal sex, sex assigned at birth, gender identity).  Both male and female reference ranges are provided where applicable.  Careful evaluation of the patient s results as compared to the gender specific reference intervals is required in this setting.    CBC with platelets and differential     Status: Abnormal   Result Value Ref Range    WBC Count 8.5 4.0 - 11.0 10e3/uL    RBC Count 2.79 (L) 3.80 - 5.90 10e6/uL    Hemoglobin 10.1 (L) 11.7 - 17.7 g/dL    Hematocrit 31.1 (L) 35.0 - 53.0 %     (H) 78 - 100 fL    MCH 36.2 (H) 26.5 - 33.0 pg    MCHC 32.5 31.5 - 36.5 g/dL    RDW 14.8 10.0 - 15.0 %    Platelet Count 197 150 - 450 10e3/uL    % Neutrophils 82 %    % Lymphocytes 6 %    % Monocytes 11 %    % Eosinophils 1 %    % Basophils 1 %    Absolute Neutrophils 6.9 1.6 - 8.3 10e3/uL    Absolute Lymphocytes 0.5 (L) 0.8 - 5.3 10e3/uL    Absolute Monocytes 0.9 0.0 - 1.3 10e3/uL    Absolute Eosinophils 0.1 0.0 - 0.7 10e3/uL    Absolute  Basophils 0.1 0.0 - 0.2 10e3/uL    Narrative    The sex of this patient cannot be reliably determined based on discrepancies in demographics (legal sex, sex assigned at birth, gender identity).  Both male and female reference ranges are provided where applicable.  Careful evaluation of the patient s results as compared to the gender specific reference intervals is required in this setting.    CBC with platelets and differential     Status: Abnormal    Narrative    The following orders were created for panel order CBC with platelets and differential.  Procedure                               Abnormality         Status                     ---------                               -----------         ------                     CBC with platelets and d...[[id number]]  Abnormal            Final result                 Please view results for these tests on the individual orders.

## 2023-04-27 PROBLEM — Z95.4 S/P TVR (TRICUSPID VALVE REPLACEMENT): Status: ACTIVE | Noted: 2023-04-27

## 2023-04-27 PROBLEM — Z98.890 S/P TVR (TRICUSPID VALVE REPAIR): Status: RESOLVED | Noted: 2023-04-26 | Resolved: 2023-04-27

## 2023-05-01 NOTE — PROGRESS NOTES
CARDIOTHORACIC SURGERY FOLLOW-UP VISIT     Amelia Michel   1960   [id number]      Reason for visit: Post-Op minimally invasive TVR (29mm [facility] Epic) with Dr. Chilango Cope on 4/10/2023    HPI: Amelia Michel is a 62 year old adult seen in clinic for a routine follow-up appointment after surgery.   Patient has past medical history of HTN, HLD, Pulm HTN, RA, large B-cell lymphoma, Pacemaker 2019 for SSS, NSVT s/p ablation Nov 2022, A-flutter, A-fib, CKD III, HFpEF, iatrogenic cushingoid features, osteoporosis, hyponatremia, and hypervolemia with LE edema.  Hospital course was unremarkable. Patient was discharged to home on 4/17/23.    Patient has been doing well since discharge and now returns to clinic for postop visit.    Patient endorses none.     Patient reports incision is healing well.    Patient denies any fever, chills, chest pain, palpitations, edema, SOB, lightheadedness, nausea and vomiting.    Patient is having Normal bowel movements and voiding without problems.    Has been not attending cardiac rehabilitation and that is going to start soon (Thursday).    Patient is back on her PTA Eliquis, no adverse effects.  Weight has been stable overall now, but did gain some weight after leaving the hospital back on PTA Torsemide just once a day.    Blood glucose levels have not been under good control.      PAST MEDICAL HISTORY:  Past Medical History:   Diagnosis Date     Arthritis      Cardiac arrest (H)      History of blood clots 2017    PICC line Right arm      History of chemotherapy      History of transfusion      Hypertension      Neuropathy      Physical deconditioning      PONV (postoperative nausea and vomiting)      Positive PPD, 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     VSD (ventricular septal defect)        PAST SURGICAL HISTORY:  Past Surgical History:   Procedure Laterality Date     ANESTHESIA CARDIOVERSION N/A 5/17/2017    Procedure: ANESTHESIA CARDIOVERSION;  ANESTHESIA CARDIOVERSION;  Surgeon: GENERIC  ANESTHESIA PROVIDER;  Location: [facility] OR     ANESTHESIA CARDIOVERSION  3/12/2018    Procedure: ANESTHESIA CARDIOVERSION;;  Surgeon: GENERIC ANESTHESIA PROVIDER;  Location: [facility]     ANESTHESIA CARDIOVERSION N/A 3/23/2018    Procedure: ANESTHESIA CARDIOVERSION;  Anesthesia Cardioversion ;  Surgeon: GENERIC ANESTHESIA PROVIDER;  Location: [facility]     ANESTHESIA CARDIOVERSION N/A 4/12/2018    Procedure: ANESTHESIA CARDIOVERSION;  Cardioversion;  Surgeon: GENERIC ANESTHESIA PROVIDER;  Location: [facility] OR     ARTHRODESIS WRIST  2000    Right wrist     BONE MARROW ASPIRATE &BIOPSY  7/12/2017          BONE MARROW BIOPSY W/ ASPIRATION  07/12/2017     CARDIOVERSION      5/17/17, 3/12/18, 3/23/18, 4/14/18,      COLONOSCOPY N/A 11/19/2019    Procedure: Colonoscopy, With Polypectomy And Biopsy;  Surgeon: Pj Vasques MD;  [facility]     CV CORONARY ANGIOGRAM N/A 11/28/2022    Procedure: Coronary Angiogram;  Surgeon: Sundar Wood MD;  Location:  HEART CARDIAC CATH LAB     CV RIGHT HEART CATH MEASUREMENTS RECORDED N/A 11/28/2022    Procedure: Right Heart Catheterization;  Surgeon: Sundar Wood MD;  Location:  HEART CARDIAC CATH LAB     EP ABLATION PVC N/A 12/1/2022    Procedure: Ablation Premature Ventricular Contractions;  Surgeon: Juan Eaton MD;  Location:  HEART CARDIAC CATH LAB     EP PACEMAKER N/A 12/13/2019    Procedure: EP Pacemaker;  Surgeon: Pj Trent MD;  Location:  HEART CARDIAC CATH LAB     EXCISE LESION UPPER EXTREMITY Left 5/22/2018    Procedure: EXCISE LESION UPPER EXTREMITY;  Left Arm Wound Excision And Closure, Possible Submuscular Transposition of Ulnar Nerve  (Choice Anesthesia);  Surgeon: Kevin Sheldon MD;  Location:  OR     FOOT SURGERY      4 left and 2 right     FOOT SURGERY      4 Left and 2 Right      IMPLANT PACEMAKER  12/13/2019    Dr China Trent  [facility]      IMPLANT PACEMAKER       RELEASE CARPAL TUNNEL Bilateral      RELEASE CARPAL  TUNNEL BILATERAL       REPAIR VALVE TRICUSPID MINIMALLY INVASIVE N/A 4/10/2023    Procedure: MINIMALLY INVASIVE TRICUSPID VALVE REPLACEMENT USING 29MM [facility] EPIC VALVE, FEMORAL CANNULATION AND RIGHT GROIN CUTDOWN, CARDIOPULMONARY BYPASS, TRANSESOPHAGEAL ECHOCARDIOGRAM PERFORMED BY ANESTHESIA STAFF;  Surgeon: Chilango Cope MD;  Location: [facility] OR     REPAIR VENTRICULAR SEPTAL DEFECT  1969     TRANSPOSITION ULNAR NERVE (ELBOW) Left 5/22/2018    Procedure: TRANSPOSITION ULNAR NERVE (ELBOW);;  Surgeon: Kevin Sheldon MD;  Location: [facility] OR     ULNAR NERVE TRANSPOSITION Left 05/22/2018     VSD REPAIR  1969     ZZC FUSION FOOT BONES,SUBTALAR Right 10/20/2020    Procedure: RIGHT SUBTALAR JOINT FUSION AND CALCANEOCUBOID FUSION;  Surgeon: Nemesio Crow MD;  Location: [facility];  Service: Orthopedics       CURRENT MEDICATIONS:   Current Outpatient Medications   Medication     acetaminophen (TYLENOL) 325 MG tablet     alendronate (FOSAMAX) 70 MG tablet     apixaban ANTICOAGULANT (ELIQUIS ANTICOAGULANT) 5 MG tablet     aspirin (ASA) 81 MG chewable tablet     diltiazem ER COATED BEADS (CARDIZEM CD/CARTIA XT) 120 MG 24 hr capsule     docusate sodium (COLACE) 100 MG capsule     empagliflozin (JARDIANCE) 10 MG TABS tablet     gabapentin (NEURONTIN) 100 MG capsule     hydroxychloroquine (PLAQUENIL) 200 MG tablet     loratadine (CLARITIN) 10 MG tablet     magnesium oxide 200 MG TABS     methocarbamol (ROBAXIN) 500 MG tablet     metolazone (ZAROXOLYN) 2.5 MG tablet     multivitamin, therapeutic with minerals (THERA-VIT-M) TABS tablet     pantoprazole (PROTONIX) 40 MG EC tablet     predniSONE (DELTASONE) 1 MG tablet     calcium carbonate 600 mg-vitamin D 400 units (CALTRATE) 600-400 MG-UNIT per tablet     dexamethasone (DECADRON) 4 MG/ML injection     melatonin 10 MG TABS tablet     predniSONE (DELTASONE) 5 MG tablet     spironolactone (ALDACTONE) 25 MG tablet     Vitamin D (Cholecalciferol) 10 MCG (400 UNIT) TABS      No current facility-administered medications for this visit.       ALLERGIES:      Allergies   Allergen Reactions     Amiodarone Other (See Comments) and Difficulty breathing     Lethargic and had trouble breathing- occurred in 2017     Blood Transfusion Related (Informational Only) Hives     Hives with platelets. Give benadryl premedication.     Metoprolol Other (See Comments)     Pt and  report that metoprolol does not work for her and also reports feeling unwell with this medication. She has been able to tolerate atenolol, which as worked in controlling her HR.      Other [No Clinical Screening - See Comments]      Penicillin Allergy Skin Test not performed, see antimicrobial management team progress note 7/5/17.       Penicillins      Childhood reaction, concern for tongue swelling     Tape [Adhesive Tape] Rash         ROS:  Review of symptoms otherwise negative unless commented about in HPI.     LABS:  Last Basic Metabolic Panel:  Lab Results   Component Value Date     04/26/2023     11/29/2022     06/23/2021      Lab Results   Component Value Date    POTASSIUM 3.7 04/26/2023    POTASSIUM 3.5 04/10/2023    POTASSIUM 3.9 07/20/2022    POTASSIUM 4.2 06/23/2021     Lab Results   Component Value Date    CHLORIDE 92 04/26/2023    CHLORIDE 101 07/20/2022    CHLORIDE 102 06/23/2021     Lab Results   Component Value Date    VIKTORIYA 9.5 04/26/2023    VIKTORIYA 10.0 06/23/2021     Lab Results   Component Value Date    CO2 30 04/26/2023    CO2 28 07/20/2022    CO2 28 06/23/2021     Lab Results   Component Value Date    BUN 26.0 04/26/2023    BUN 31 07/20/2022    BUN 25 06/23/2021     Lab Results   Component Value Date    CR 0.93 04/26/2023    CR 1.05 06/23/2021     Lab Results   Component Value Date    GLC 96 04/26/2023     04/17/2023     07/20/2022    GLC 98 06/23/2021       Last CBC:   Lab Results   Component Value Date    WBC 8.5 04/26/2023    WBC Canceled, Test credited 03/16/2021      Lab Results   Component Value Date    RBC 2.79 04/26/2023    RBC Canceled, Test credited 03/16/2021     Lab Results   Component Value Date    HGB 10.1 04/26/2023    HGB Canceled, Test credited 03/16/2021     Lab Results   Component Value Date    HCT 31.1 04/26/2023    HCT Canceled, Test credited 03/16/2021     No components found for: MCT  Lab Results   Component Value Date     04/26/2023    MCV Canceled, Test credited 03/16/2021     Lab Results   Component Value Date    MCH 36.2 04/26/2023    MCH Canceled, Test credited 03/16/2021     Lab Results   Component Value Date    MCHC 32.5 04/26/2023    MCHC Canceled, Test credited 03/16/2021     Lab Results   Component Value Date    RDW 14.8 04/26/2023    RDW Canceled, Test credited 03/16/2021     Lab Results   Component Value Date     04/26/2023    PLT Canceled, Test credited 03/16/2021       IMAGING:  None    PHYSICAL EXAM:   /78 (BP Location: Right arm, Patient Position: Chair, Cuff Size: Adult Regular)   Pulse 99   Wt 48.5 kg (107 lb)   SpO2 99%   BMI 23.15 kg/m    General: alert and oriented x 3, pleasant, no acute distress, normal mood and affect  Neuro: no focal deficits   CV: S1 S2, no murmurs, rubs or gallops, regular rate and rhythm, no peripheral edema  Pulm: bilateral breath sounds, clear to auscultation, easy work of breathing  Incision: incisions clean dry and intact without erythema, swelling or drainage    PROCEDURES: None       ASSESSMENT/PLAN:  Amelia Michel is a 62 year old year old adult status post TVR who returns to clinic for postop visit.     1. Surgically doing well overall.  Incisions are healing well with no signs of infection. Increasing activity and strength overall.   2. Hemodynamics are stable. No medication changes were needed today.  3. Follow up with your cardiology team, Elmira Vasquez NP, as scheduled on 5/4/23, with ECHO prior to visit. Dr Eaton on 8/8/23.  4. Follow up with your PCP within 1-2 weeks, if  not already seen.  5. Continue Outpatient Cardiac Rehab until completed.   6. Continue sternal precautions for 12 weeks from surgery date.   7. No driving for 2 weeks from surgery date.       The total time spent with the patient was 25 minutes, > 50% of which was spent in counseling.    MICHELET Stringer

## 2023-05-02 ENCOUNTER — OFFICE VISIT (OUTPATIENT)
Dept: CARDIOLOGY | Facility: CLINIC | Age: 63
End: 2023-05-02
Attending: THORACIC SURGERY (CARDIOTHORACIC VASCULAR SURGERY)
Payer: COMMERCIAL

## 2023-05-02 VITALS
SYSTOLIC BLOOD PRESSURE: 126 MMHG | BODY MASS INDEX: 23.15 KG/M2 | HEART RATE: 99 BPM | WEIGHT: 107 LBS | OXYGEN SATURATION: 99 % | DIASTOLIC BLOOD PRESSURE: 78 MMHG

## 2023-05-02 DIAGNOSIS — I50.30 HEART FAILURE WITH PRESERVED EJECTION FRACTION, NYHA CLASS I (H): Primary | ICD-10-CM

## 2023-05-02 PROCEDURE — 99024 POSTOP FOLLOW-UP VISIT: CPT | Performed by: PHYSICIAN ASSISTANT

## 2023-05-02 PROCEDURE — G0463 HOSPITAL OUTPT CLINIC VISIT: HCPCS

## 2023-05-02 RX ORDER — DILTIAZEM HYDROCHLORIDE 120 MG/1
120 CAPSULE, EXTENDED RELEASE ORAL DAILY
Qty: 90 CAPSULE | Refills: 1 | Status: SHIPPED | OUTPATIENT
Start: 2023-05-02 | End: 2023-08-08

## 2023-05-02 ASSESSMENT — PAIN SCALES - GENERAL: PAINLEVEL: NO PAIN (0)

## 2023-05-02 NOTE — NURSING NOTE
Chief Complaint   Patient presents with     Follow Up     Post op     Vitals were taken and medications reconciled.    Wiley Pepper, EMT  2:25 PM

## 2023-05-02 NOTE — PATIENT INSTRUCTIONS
Your surgery was on 04/10/2023    Ok to start driving four weeks after the date of surgery as long as you are no longer taking narcotic pain medications.    From the date of surgery: no lifting greater than 10 pounds for 8 weeks, then no lifting greater than 30 pounds for an additional 4 weeks, and together this will total 12 weeks from surgery.    Do not soak your incisions until fully healed over with no scabbing; this includes hot tubs, baths, pools etc.    You had valve surgery, ensure you take prophylactic antibiotics prior to any dental procedure.    If you have questions or concerns, please call us:    Maykel Tipton, RN Care Coordinator - [phone]   Bre Jacome, RN Care Coordinator - [phone]  Eli Soares, RN Care Coordinator - [phone]

## 2023-05-03 ENCOUNTER — MYC REFILL (OUTPATIENT)
Dept: RHEUMATOLOGY | Facility: CLINIC | Age: 63
End: 2023-05-03

## 2023-05-03 ENCOUNTER — LAB (OUTPATIENT)
Dept: LAB | Facility: CLINIC | Age: 63
End: 2023-05-03
Payer: COMMERCIAL

## 2023-05-03 DIAGNOSIS — Z95.4 S/P TVR (TRICUSPID VALVE REPLACEMENT): ICD-10-CM

## 2023-05-03 DIAGNOSIS — M81.8 STEROID-INDUCED OSTEOPOROSIS: ICD-10-CM

## 2023-05-03 DIAGNOSIS — M05.79 RHEUMATOID ARTHRITIS INVOLVING MULTIPLE SITES WITH POSITIVE RHEUMATOID FACTOR (H): ICD-10-CM

## 2023-05-03 DIAGNOSIS — T38.0X5A STEROID-INDUCED OSTEOPOROSIS: ICD-10-CM

## 2023-05-03 LAB
ANION GAP SERPL CALCULATED.3IONS-SCNC: 14 MMOL/L (ref 7–15)
BUN SERPL-MCNC: 18 MG/DL (ref 8–23)
CALCIUM SERPL-MCNC: 9.6 MG/DL (ref 8.8–10.2)
CHLORIDE SERPL-SCNC: 94 MMOL/L (ref 98–107)
CREAT SERPL-MCNC: 0.9 MG/DL (ref 0.51–1.17)
DEPRECATED HCO3 PLAS-SCNC: 29 MMOL/L (ref 22–29)
GFR SERPL CREATININE-BSD FRML MDRD: 72 ML/MIN/1.73M2
GLUCOSE SERPL-MCNC: 100 MG/DL (ref 70–99)
POTASSIUM SERPL-SCNC: 3.1 MMOL/L (ref 3.4–5.3)
SODIUM SERPL-SCNC: 137 MMOL/L (ref 136–145)

## 2023-05-03 PROCEDURE — 36415 COLL VENOUS BLD VENIPUNCTURE: CPT

## 2023-05-03 PROCEDURE — 80048 BASIC METABOLIC PNL TOTAL CA: CPT

## 2023-05-03 RX ORDER — HYDROXYCHLOROQUINE SULFATE 200 MG/1
200 TABLET, FILM COATED ORAL DAILY
Qty: 90 TABLET | Refills: 0 | Status: CANCELLED | OUTPATIENT
Start: 2023-05-03

## 2023-05-03 RX ORDER — HYDROXYCHLOROQUINE SULFATE 200 MG/1
200 TABLET, FILM COATED ORAL DAILY
Qty: 90 TABLET | Refills: 2 | Status: SHIPPED | OUTPATIENT
Start: 2023-05-03 | End: 2023-09-21

## 2023-05-03 NOTE — TELEPHONE ENCOUNTER
hydroxychloroquine (PLAQUENIL) 200 MG tablet  Plaquenil      Last Written Prescription Date:  1-4-2023  Last Fill Quantity: *90,   # refills: 0  Last Office Visit: 9-  Future Office visit: 9-    Last Eye Exam:    Refill Team has reviewed Health Maint., [facility] and Media.    Found -     1/10/2023  2:00 PM   Plaquenil Eye Exam    Date of last Eye Exam: 1/5/2023    Ophthalmologist: Ramon Sousa    Eye Exam Location: [facility]    Follow Up Needed? --    OCT last date done: 1/5/2023    VF (Most Recent Date): 1/5/2023    Toxicity? No      Labs completed on :4-    Creatinine 0.51 - 1.17 mg/dL 0.93

## 2023-05-04 ENCOUNTER — HOSPITAL ENCOUNTER (OUTPATIENT)
Dept: CARDIAC REHAB | Facility: CLINIC | Age: 63
Discharge: HOME | End: 2023-05-04
Attending: THORACIC SURGERY (CARDIOTHORACIC VASCULAR SURGERY)
Payer: COMMERCIAL

## 2023-05-04 ENCOUNTER — MYC MEDICAL ADVICE (OUTPATIENT)
Dept: CARDIOLOGY | Facility: CLINIC | Age: 63
End: 2023-05-04

## 2023-05-04 DIAGNOSIS — E87.6 HYPOKALEMIA: Primary | ICD-10-CM

## 2023-05-04 DIAGNOSIS — Z95.4 S/P TVR (TRICUSPID VALVE REPLACEMENT): ICD-10-CM

## 2023-05-04 DIAGNOSIS — I50.30 HEART FAILURE WITH PRESERVED EJECTION FRACTION, NYHA CLASS I (H): ICD-10-CM

## 2023-05-04 PROCEDURE — 93797 PHYS/QHP OP CAR RHAB WO ECG: CPT

## 2023-05-04 PROCEDURE — 93798 PHYS/QHP OP CAR RHAB W/ECG: CPT

## 2023-05-04 RX ORDER — POTASSIUM CHLORIDE 1500 MG/1
60 TABLET, EXTENDED RELEASE ORAL DAILY
Qty: 270 TABLET | Refills: 3 | Status: SHIPPED | OUTPATIENT
Start: 2023-05-04 | End: 2024-03-22

## 2023-05-04 NOTE — CONFIDENTIAL NOTE
Amelia reports that she went home from the hospital at 103 lbs.  She has gotten as high as 105.6 but really feels that this is caloric weight rather than fluid.   She has been taking torsemide 20 mg daily.  She is NOT taking spironolactone    Date: 5/4/2023    Time of Call: 4:34 PM     Diagnosis:  hypokalemia     [ VORB ] Ordering provider: Elmira Vasquez NP    Order: Potassium 40 mEq today, potassium 60 mEq daily starting tomorrow, BMP in a week     Order received by: Kandy Molina RN       Follow-up/additional notes: Amelia stated understanding and will schedule her own lab appointment

## 2023-05-04 NOTE — TELEPHONE ENCOUNTER
Refills available     Disp Refills Start End SOPHY   hydroxychloroquine (PLAQUENIL) 200 MG tablet 90 tablet 2 5/3/2023  No   Sig - Route: Take 1 tablet (200 mg) by mouth daily - Oral

## 2023-05-09 ENCOUNTER — HOSPITAL ENCOUNTER (OUTPATIENT)
Dept: CARDIAC REHAB | Facility: CLINIC | Age: 63
Discharge: HOME | End: 2023-05-09
Attending: INTERNAL MEDICINE
Payer: COMMERCIAL

## 2023-05-09 PROCEDURE — 93798 PHYS/QHP OP CAR RHAB W/ECG: CPT

## 2023-05-10 ENCOUNTER — LAB (OUTPATIENT)
Dept: LAB | Facility: CLINIC | Age: 63
End: 2023-05-10
Attending: NURSE PRACTITIONER
Payer: COMMERCIAL

## 2023-05-10 DIAGNOSIS — E87.6 HYPOKALEMIA: ICD-10-CM

## 2023-05-10 DIAGNOSIS — I50.30 HEART FAILURE WITH PRESERVED EJECTION FRACTION, NYHA CLASS I (H): ICD-10-CM

## 2023-05-10 LAB
ANION GAP SERPL CALCULATED.3IONS-SCNC: 14 MMOL/L (ref 7–15)
BUN SERPL-MCNC: 20.4 MG/DL (ref 8–23)
CALCIUM SERPL-MCNC: 10.1 MG/DL (ref 8.8–10.2)
CHLORIDE SERPL-SCNC: 97 MMOL/L (ref 98–107)
CREAT SERPL-MCNC: 0.92 MG/DL (ref 0.51–1.17)
DEPRECATED HCO3 PLAS-SCNC: 28 MMOL/L (ref 22–29)
GFR SERPL CREATININE-BSD FRML MDRD: 70 ML/MIN/1.73M2
GLUCOSE SERPL-MCNC: 91 MG/DL (ref 70–99)
POTASSIUM SERPL-SCNC: 3.7 MMOL/L (ref 3.4–5.3)
SODIUM SERPL-SCNC: 139 MMOL/L (ref 136–145)

## 2023-05-10 PROCEDURE — 80048 BASIC METABOLIC PNL TOTAL CA: CPT

## 2023-05-10 PROCEDURE — 36415 COLL VENOUS BLD VENIPUNCTURE: CPT

## 2023-05-11 ENCOUNTER — HOSPITAL ENCOUNTER (OUTPATIENT)
Dept: CARDIAC REHAB | Facility: CLINIC | Age: 63
Discharge: HOME | End: 2023-05-11
Attending: INTERNAL MEDICINE
Payer: COMMERCIAL

## 2023-05-11 PROCEDURE — 93798 PHYS/QHP OP CAR RHAB W/ECG: CPT

## 2023-05-11 NOTE — ANESTHESIA POSTPROCEDURE EVALUATION
05/11/23 1242   Clinical Encounter Type   Visited With Patient not available   Crisis Visit ED  (Stroke Alert)   Referral From    Taxonomy   Interventions Silent prayer     Summary:  received referral from on-call  for Stroke Alert.  responded to Stroke Alert, patient was busy with staff. Spiritual Care support can be requested via an Epic consult.     -Robin Mcintosh, Chaplain Resident. Patient: Amelia Michel    Procedure(s):  Cardioversion    Diagnosis:Atrial Fibulation  Diagnosis Additional Information: No value filed.    Anesthesia Type:  No value filed.    Note:  Anesthesia Post Evaluation    Patient location during evaluation: Echo Lab  Patient participation: Able to fully participate in evaluation  Level of consciousness: awake and alert  Pain management: adequate  Airway patency: patent  Cardiovascular status: hemodynamically stable  Respiratory status: acceptable  Hydration status: acceptable  PONV: none             Last vitals:  Vitals:    04/12/18 0745   BP: 113/85   Pulse: 74   Resp: 16   Temp: 36.4  C (97.6  F)   SpO2: 98%         Electronically Signed By: Kerri Naidu MD  April 12, 2018  9:53 AM

## 2023-05-16 ENCOUNTER — HOSPITAL ENCOUNTER (OUTPATIENT)
Dept: CARDIAC REHAB | Facility: CLINIC | Age: 63
Discharge: HOME | End: 2023-05-16
Attending: INTERNAL MEDICINE
Payer: COMMERCIAL

## 2023-05-16 PROCEDURE — 93798 PHYS/QHP OP CAR RHAB W/ECG: CPT

## 2023-05-16 PROCEDURE — 93797 PHYS/QHP OP CAR RHAB WO ECG: CPT | Mod: 59

## 2023-05-18 ENCOUNTER — HOSPITAL ENCOUNTER (OUTPATIENT)
Dept: CARDIAC REHAB | Facility: CLINIC | Age: 63
Discharge: HOME | End: 2023-05-18
Attending: INTERNAL MEDICINE
Payer: COMMERCIAL

## 2023-05-18 PROCEDURE — 93798 PHYS/QHP OP CAR RHAB W/ECG: CPT

## 2023-05-19 DIAGNOSIS — I50.30 HEART FAILURE WITH PRESERVED EJECTION FRACTION, NYHA CLASS I (H): Primary | ICD-10-CM

## 2023-05-23 ENCOUNTER — HOSPITAL ENCOUNTER (OUTPATIENT)
Dept: CARDIAC REHAB | Facility: CLINIC | Age: 63
Discharge: HOME | End: 2023-05-23
Attending: INTERNAL MEDICINE
Payer: COMMERCIAL

## 2023-05-23 ENCOUNTER — HOSPITAL ENCOUNTER (OUTPATIENT)
Dept: CARDIAC REHAB | Facility: CLINIC | Age: 63
Discharge: HOME | End: 2023-05-23
Attending: THORACIC SURGERY (CARDIOTHORACIC VASCULAR SURGERY)
Payer: COMMERCIAL

## 2023-05-23 PROCEDURE — 93797 PHYS/QHP OP CAR RHAB WO ECG: CPT

## 2023-05-23 PROCEDURE — 93798 PHYS/QHP OP CAR RHAB W/ECG: CPT

## 2023-05-24 ENCOUNTER — ANCILLARY PROCEDURE (OUTPATIENT)
Dept: CARDIOLOGY | Facility: CLINIC | Age: 63
End: 2023-05-24
Attending: NURSE PRACTITIONER
Payer: COMMERCIAL

## 2023-05-24 ENCOUNTER — LAB (OUTPATIENT)
Dept: LAB | Facility: CLINIC | Age: 63
End: 2023-05-24
Payer: COMMERCIAL

## 2023-05-24 VITALS
HEART RATE: 89 BPM | WEIGHT: 106.2 LBS | OXYGEN SATURATION: 98 % | BODY MASS INDEX: 22.98 KG/M2 | DIASTOLIC BLOOD PRESSURE: 82 MMHG | SYSTOLIC BLOOD PRESSURE: 125 MMHG

## 2023-05-24 DIAGNOSIS — I50.30 HEART FAILURE WITH PRESERVED EJECTION FRACTION, NYHA CLASS I (H): ICD-10-CM

## 2023-05-24 DIAGNOSIS — I50.22 CHRONIC SYSTOLIC HEART FAILURE (H): ICD-10-CM

## 2023-05-24 DIAGNOSIS — I07.1 TRICUSPID VALVE INSUFFICIENCY, UNSPECIFIED ETIOLOGY: ICD-10-CM

## 2023-05-24 DIAGNOSIS — I50.9 CONGESTIVE HEART FAILURE, UNSPECIFIED HF CHRONICITY, UNSPECIFIED HEART FAILURE TYPE (H): ICD-10-CM

## 2023-05-24 DIAGNOSIS — I50.30 HEART FAILURE WITH PRESERVED EJECTION FRACTION, NYHA CLASS I (H): Primary | ICD-10-CM

## 2023-05-24 DIAGNOSIS — I47.19 ATRIAL TACHYCARDIA (H): ICD-10-CM

## 2023-05-24 LAB
ANION GAP SERPL CALCULATED.3IONS-SCNC: 13 MMOL/L (ref 7–15)
BUN SERPL-MCNC: 29.6 MG/DL (ref 8–23)
CALCIUM SERPL-MCNC: 10.1 MG/DL (ref 8.8–10.2)
CHLORIDE SERPL-SCNC: 96 MMOL/L (ref 98–107)
CREAT SERPL-MCNC: 0.93 MG/DL (ref 0.51–1.17)
DEPRECATED HCO3 PLAS-SCNC: 29 MMOL/L (ref 22–29)
GFR SERPL CREATININE-BSD FRML MDRD: 69 ML/MIN/1.73M2
GLUCOSE SERPL-MCNC: 100 MG/DL (ref 70–99)
LVEF ECHO: NORMAL
POTASSIUM SERPL-SCNC: 4.1 MMOL/L (ref 3.4–5.3)
SODIUM SERPL-SCNC: 138 MMOL/L (ref 136–145)

## 2023-05-24 PROCEDURE — G0463 HOSPITAL OUTPT CLINIC VISIT: HCPCS | Performed by: NURSE PRACTITIONER

## 2023-05-24 PROCEDURE — 36415 COLL VENOUS BLD VENIPUNCTURE: CPT | Performed by: PATHOLOGY

## 2023-05-24 PROCEDURE — 80048 BASIC METABOLIC PNL TOTAL CA: CPT | Performed by: PATHOLOGY

## 2023-05-24 PROCEDURE — 99214 OFFICE O/P EST MOD 30 MIN: CPT | Mod: 25 | Performed by: NURSE PRACTITIONER

## 2023-05-24 PROCEDURE — 93306 TTE W/DOPPLER COMPLETE: CPT | Performed by: INTERNAL MEDICINE

## 2023-05-24 RX ORDER — SPIRONOLACTONE 25 MG/1
12.5 TABLET ORAL DAILY
Qty: 30 TABLET | Refills: 3 | Status: SHIPPED | OUTPATIENT
Start: 2023-05-24 | End: 2024-01-11

## 2023-05-24 RX ORDER — DILTIAZEM HYDROCHLORIDE 180 MG/1
180 CAPSULE, EXTENDED RELEASE ORAL DAILY
Qty: 90 CAPSULE | Refills: 3 | Status: SHIPPED | OUTPATIENT
Start: 2023-05-24 | End: 2023-08-08

## 2023-05-24 ASSESSMENT — PAIN SCALES - GENERAL: PAINLEVEL: NO PAIN (0)

## 2023-05-24 NOTE — NURSING NOTE
Chief Complaint   Patient presents with     Follow Up     5/24/23: reason for visit: Return CORE ; 62 year old with chronic diastolic heart failure presents for follow up with labs prior.      Vitals were taken and medications reconciled.    Wiley Pepper, SHAWN  2:05 PM

## 2023-05-24 NOTE — LETTER
5/24/2023      RE: Amelia Michel  [address]  [facility] [address]       Dear Colleague,    Thank you for the opportunity to participate in the care of your patient, Amelia Michel, at the [facility] [place] at [facility]. Please see a copy of my visit note below.      [facility] Cardiology   CORE Clinic      HPI:   Ms. 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 She was recently admitted 11/19/-12/5/22 with ADHF and frequent NSVT, now s/p VT ablation. [place] [facility] [place].     With regards to her cardiac conditions, as noted, Ms. Michel presented to [facility] on 11/19 with MONTALVO, BLE edema, and 8 pound weight gain over the course of one week. Chest X-ray was suspicious for pulmonary edema, NT-P BNP 1,913, Troponin T 21-->12. EDW per chart review 116-120; admission weight 128. She was admitted w/ decompensated diastolic heart failure with pictutre c/b frequent non sustained ventricular tachycardia with RHC on 11/28/22 notable for cardiogenic shock with a cardiac index of 1.2 and CO of 1.6. Etiology thought to most likely secondary to frequent non sustained ventricular tachycardia in the setting of possible restrictive/constrictive physiology given her malignancy history (albeit presumably in remission). CAD ruled on by coronary angiogram completed on 11/28/22. She underwent VT ablation on 12/1/22 and had successful resumption of her home medications with increased diuretic dosing and adddition of an SGLT2 inhibitor. She was diuresed  19 lbs to a weight of 109 lbs at time of discharge.     At CORE visit on 1/9/23 Ms. Michel was hypervolemic as  evident by weight gain, tachycardia, and peripheral edema. Review of labs demonstrated persistent hyponatremia with Na 127 and bump in Cr to 1.35. Lasix was increase 60 mg BID. Still awaiting for PA for cardiomems device. Admitted 1/24-1/29 for CHF exacerbation. Diuresed and switched to torsemide 40 mg BID. Discharged at a weight of 107 lb.     Device interrogation from 2/7 with rates 120s-140, AFL. Diltiazem increased to 240 mg daily. Since our last CORE visit in 2/2023 she has resumed torsemide 20 mg BID and spironolactone 12.5 mg daily. She was evaluated by Dr. Cope for severe TR. Admitted 4/10-4/17/23 for elective TVR with Dr. Cope.     Today, Ms. Michel reports feeling well. Her weights have been stable at 103-104 lb. She is without peripheral edema, worsening dyspnea on exertion, orthopnea, or PND. Has some MONTALVO but feels it proportional to the activity. Denies chest pain, SOB, palpitations, lightheadedness, syncope, presyncope. Remains on eliquis for CVA prophylaxis and ASA 81 mg with new valve. Increased bruising since starting on ASA. Reports that HRs in cardiac rehab are ranging 100-150 bmp. Diltiazem was reduced in hospital. Asymptomatic to increased rates.        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     VSD (ventricular septal defect)        FAMILY HISTORY:  Family History   Problem Relation Age of Onset     Breast  Cancer Mother         60s     Hypertension Mother      Alzheimer Disease Mother      Cerebrovascular Disease Mother      Hypertension Father      Cerebrovascular Disease Father      Atrial fibrillation Father      Lymphoma Father      Prostate Cancer Father      Alzheimer Disease Maternal Grandmother         likely     Arthritis Maternal Grandfather      Pneumonia Maternal Grandfather      Diabetes Paternal Grandmother        SOCIAL HISTORY:  Social History     Socioeconomic History     Marital status:      Spouse name: Romeo Michel     Number of children: 0   Occupational History     Employer: [employer]   Tobacco Use     Smoking status: Never     Smokeless tobacco: Never   Substance and Sexual Activity     Alcohol use: Not Currently     Comment: none     Drug use: No   Other Topics Concern     Parent/sibling w/ CABG, MI or angioplasty before 65F 55M? No     Social Determinants of Health     Intimate Partner Violence: Not At Risk     Fear of Current or Ex-Partner: No     Emotionally Abused: No     Physically Abused: No     Sexually Abused: No       CURRENT MEDICATIONS:  acetaminophen (TYLENOL) 325 MG tablet, Take 2 tablets (650 mg) by mouth every 4 hours as needed for other (For optimal non-opioid multimodal pain management to improve pain control.)  alendronate (FOSAMAX) 70 MG tablet, Take 1 tablet (70 mg) by mouth every 7 days (Patient taking differently: Take 70 mg by mouth every 7 days Mondays)  apixaban ANTICOAGULANT (ELIQUIS ANTICOAGULANT) 5 MG tablet, Take 1 tablet (5 mg) by mouth 2 times daily  aspirin (ASA) 81 MG chewable tablet, 1 tablet (81 mg) by Oral or NG Tube route daily  calcium carbonate 600 mg-vitamin D 400 units (CALTRATE) 600-400 MG-UNIT per tablet, Take 2 tablets by mouth daily  dexamethasone (DECADRON) 4 MG/ML injection, Inject 4 mg IM for adrenal crisis  diltiazem ER (DILT-XR) 120 MG 24 hr capsule, Take 1 capsule (120 mg) by mouth daily  docusate sodium (COLACE)  100 MG capsule, Take 100 mg by mouth 2 times daily as needed for constipation  empagliflozin (JARDIANCE) 10 MG TABS tablet, Take 1 tablet (10 mg) by mouth daily for 360 days  gabapentin (NEURONTIN) 100 MG capsule, TAKE 1 CAPSULE BY MOUTH IN THE MORNING, 1 CAPSULE BY MOUTH IN THE AFTERNOON, AND 2 CAPSULES BY MOUTH AT BEDTIME  hydroxychloroquine (PLAQUENIL) 200 MG tablet, Take 1 tablet (200 mg) by mouth daily  loratadine (CLARITIN) 10 MG tablet, Take 10 mg by mouth daily  magnesium oxide 200 MG TABS, Take 200 mg by mouth daily bedtime  methocarbamol (ROBAXIN) 500 MG tablet, Take 1 tablet (500 mg) by mouth 3 times daily  metolazone (ZAROXOLYN) 2.5 MG tablet, Take 1 tablet (2.5 mg) by mouth as needed (ONLY take if instructed to do so by your cardiology team.)  multivitamin, therapeutic with minerals (THERA-VIT-M) TABS tablet, Take 1 tablet by mouth daily  potassium chloride ER (KLOR-CON M) 20 MEQ CR tablet, Take 3 tablets (60 mEq) by mouth daily  predniSONE (DELTASONE) 1 MG tablet, Take 3 tablets (3 mg) by mouth daily - Oral  predniSONE (DELTASONE) 5 MG tablet, 10 mg when sick for 3 days or until back to baseline for secondary adrenal insufficiency  Vitamin D (Cholecalciferol) 10 MCG (400 UNIT) TABS, Take 1 tablet by mouth daily  diltiazem ER COATED BEADS (CARDIZEM CD/CARTIA XT) 120 MG 24 hr capsule, Take 1 capsule (120 mg) by mouth daily  spironolactone (ALDACTONE) 25 MG tablet, Take 0.5 tablets (12.5 mg) by mouth daily    No current facility-administered medications on file prior to visit.      ROS:   Refer to HPI    EXAM:  /82 (BP Location: Right arm, Patient Position: Chair, Cuff Size: Adult Regular)   Pulse 89   Wt 48.2 kg (106 lb 3.2 oz)   SpO2 98%   BMI 22.98 kg/m     GENERAL: Appears comfortable, in no acute distress.   HEENT: Eye symmetrical, no discharge or icterus bilaterally. Mucous membranes moist and without lesions.  CV: tachycardic, irregular, +S1S2, systolic murmur, no rub, or gallop. JVP at  clavicle    RESPIRATORY: Respirations regular, even, and unlabored. Lungs CTA throughout.   GI: Soft and non distended with normoactive bowel sounds present in all quadrants. No tenderness, rebound, guarding. No hepatomegaly.   EXTREMITIES: No peripheral edema. 2+ bilateral pedal pulses.   NEUROLOGIC: Alert and oriented x 3. No focal deficits.   MUSCULOSKELETAL: No joint swelling or tenderness.   SKIN: No jaundice. No rashes or lesions.     Labs, reviewed with patient in clinic today:  CBC RESULTS:  Lab Results   Component Value Date    WBC 8.5 04/26/2023    WBC Canceled, Test credited 03/16/2021    RBC 2.79 (L) 04/26/2023    RBC Canceled, Test credited 03/16/2021    HGB 10.1 (L) 04/26/2023    HGB Canceled, Test credited 03/16/2021    HCT 31.1 (L) 04/26/2023    HCT Canceled, Test credited 03/16/2021     (H) 04/26/2023    MCV Canceled, Test credited 03/16/2021    MCH 36.2 (H) 04/26/2023    MCH Canceled, Test credited 03/16/2021    MCHC 32.5 04/26/2023    MCHC Canceled, Test credited 03/16/2021    RDW 14.8 04/26/2023    RDW Canceled, Test credited 03/16/2021     04/26/2023    PLT Canceled, Test credited 03/16/2021       CMP RESULTS:  Lab Results   Component Value Date     05/10/2023     (L) 11/29/2022     06/23/2021    POTASSIUM 3.7 05/10/2023    POTASSIUM 3.5 04/10/2023    POTASSIUM 3.9 07/20/2022    POTASSIUM 4.2 06/23/2021    CHLORIDE 97 (L) 05/10/2023    CHLORIDE 101 07/20/2022    CHLORIDE 102 06/23/2021    CO2 28 05/10/2023    CO2 28 07/20/2022    CO2 28 06/23/2021    ANIONGAP 14 05/10/2023    ANIONGAP 7 07/20/2022    ANIONGAP 6 06/23/2021    GLC 91 05/10/2023     (H) 04/17/2023     (H) 07/20/2022    GLC 98 06/23/2021    BUN 20.4 05/10/2023    BUN 31 (H) 07/20/2022    BUN 25 06/23/2021    CR 0.92 05/10/2023    CR 1.05 (H) 06/23/2021    GFRESTIMATED 70 05/10/2023    GFRESTIMATED 57 (L) 06/23/2021    GFRESTBLACK 67 06/23/2021    VIKTORIYA 10.1 05/10/2023    VIKTORIYA 10.0  06/23/2021    BILITOTAL 1.5 (H) 04/17/2023    BILITOTAL 1.2 06/23/2021    ALBUMIN 3.6 04/17/2023    ALBUMIN 4.4 04/20/2022    ALBUMIN 4.2 06/23/2021    ALKPHOS 115 04/17/2023    ALKPHOS 136 06/23/2021    ALT 24 04/17/2023    ALT 41 06/23/2021    AST 32 04/17/2023    AST 36 06/23/2021        INR RESULTS:  Lab Results   Component Value Date    INR 1.71 (H) 04/10/2023    INR 3.42 (H) 03/07/2018       Lab Results   Component Value Date    MAG 2.0 04/16/2023    MAG 1.9 04/12/2018     Lab Results   Component Value Date    NTBNPI 2,067 (H) 01/24/2023    NTBNPI 485 12/12/2019     Lab Results   Component Value Date    NTBNP 1,425 (H) 10/13/2021    NTBNP 1,274 (H) 05/26/2021       Cardiac Diagnostics:    5/24/2023 Echo   Interpretation Summary  Tricuspid valve replacement with 29mm St Silvio Epic.  Doppler interrogation of the tricspid valve is normal.  Global and regional left ventricular function is normal with an EF of 55-60%.  The right ventricle is normal size. Global right ventricular function is  mildly to moderately reduced.  Severe biatrial enlargement is present.  Mild to moderate mitral insufficiency is present.  IVC diameter >2.1 cm collapsing <50% with sniff suggests a high RA pressure  estimated at 15 mmHg or greater.  No pericardial effusion is present.  Compared to prior, RV appears slightly better, CVP is higher now.    4/17/2023 Echo   Interpretation Summary  Minimally invasive tricuspid valve replacement with 29mm St Silvio Epic. Doppler  interrogation of the tricspid valve is normal. TV mean gradient 4-6 mmHg.  Moderate right ventricular dilation is present. Global right ventricular  function is mildly to moderately reduced.  Global and regional left ventricular function is normal with an EF of 60-65%.  Mild to moderate mitral insufficiency is present.  IVC diameter <2.1 cm collapsing >50% with sniff suggests a normal RA pressure  of 3 mmHg.  No pericardial effusion is present.  Compared to prior TVR is  new.    2/7/2023 Device Interrogation   Device: Concuitytronic [id number] [place] XT DR MRI  Normal Device Function.  Mode: VVIR  bpm  : 8.6%  Presenting EGM: Narrow Complex tachycardia @ 139 bpm  Short V-V intervals: 0  Lead Trends Appear Stable.  Estimated battery longevity to RRT = 11 years. Battery voltage = 3.02 V.  Atrial Arrhythmia: AFL  Anticoagulant: Apixaban  Ventricular Arrhythmia: 4 episodes lasting 1-4 sec 194-245 bpm  Transmission discussed with Maria Esther Cutler NP. Orders to increase diltiazem from 180 mg daily to 240 mg daily.    12/5/2022 ECHO  Interpretation Summary  Severe tricuspid insufficiency is present.  Moderate right ventricular dilation is present. Global right ventricular  function is mildly reduced.  Global and regional left ventricular function is normal with an EF of 55-60%.  IVC diameter >2.1 cm collapsing <50% with sniff suggests a high RA pressure  estimated at 15 mmHg or greater.  No pericardial effusion is present.  No significant changes noted.    11/28/2022 RHC with coronary angiogram   Right sided filling pressures are severely elevated.  Mild elevated pulmonary hypertension.  Left sided filling pressures are moderately elevated.  Reduced cardiac output level.  Hemodynamic data has been modified in Epic per physician review.  Prox LAD lesion is 30% stenosed.     Mild non-obstructive coronary artery disease        Assessment and Plan:   Ms. 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 She was recently admitted 11/19/-12/5/22 with ADHF and frequent NSVT, now s/p VT ablation. She presents to  CORE for hospital follow up.     Overall, appearing well. Weights are very stable at 103-104 lbs. Labs from today are pending. Echo from today shows preserved LVEF and mildly improved RV function. CVP estimated at 15. Despite elevated CVP, Amelia looks euvolemic by exam. She is on torsemide 20 mg daily. I would opt to continue this dose and resume spironolactone 12.5 mg daily. Further, will increase diltiazem to 180 mg daily for elevated heart rates. Previously was on 240 mg daily. Given resumption of spironolactone, will plan for repeat labs in 1 week. Amelia is very mindful of her sodium intake. She wears compression stockings and monitors her weights daily. She understands to call with any weight gain or changes in symptoms. Continuing with appeal for cardioMEMs.       # Acute on chronic diastolic heart failure with dilated and mildly reduced RV function (LVEF 55-60%)  Pre-Ablation: 11/28/22 RHC: CVP 24, PA 48/27/34, PCWP 23, SVO2 46, CI 1.1, CO 1.6  Post Ablation and Diuresis: 12/3/22 RHC: CVP 16, PA 41/22/28, PCWP 15, SVO2 69, CI 2.1, CO 3.0    NYHA Class III, AHA/ACC Stage C  Rate control: 89  volume status: euvolemic, continue torsemide 20 mg BID   Blood pressure control: increase Diltiazem 180 mg daily   Aldosterone antagonist: start Aldactone 12.5 mg daily   SGLT2:  Empagliflozin 10 mg daily   Evaluation of coronary arteries: 11/28/22 angiogram mild non-obstructive CAD     # Frequent, non-sustained ventricular tachycardia s/p VT Ablation (by Dr. Eaton on 12/1/22)  - Anti-arrhythmic: none, s/p VT ablation  - Stopped PTA digoxin per EP recommendations  - Anticoagulation: given history of Afib, continue Apixaban 5 mg bid  - Follow up with EP specialists (Dr. Eaton and/or Maria Esther Cutler NP)    # HTN Intolerant to Metoprolol in the past.   - Diltiazem 180 mg daily, spironolactone 12.5 mg daily       # Aflutter. History of SSS s/p PPM 12/19. Long standing history of atrial arrythmias.  - Diltiazem 180 mg  daily  - Continue Eliquis.   - Follow up with EP as scheduled.      # VSD s/p repair.     # History of exudative pericardial effusion.   - Stable per CT 3/19.     # Severe TR per TTE 11/2022 s/p TVR on 4/10/23  - continue ASA 81 mg daily         Follow up:  - Labs in 1 week  - Dr. Eaton 8/2023  - CORE in 4 months, sooner if needed          Elmira Vasquez DNP, NP-C  Advance Heart Failure  5/24/2023

## 2023-05-24 NOTE — NURSING NOTE
Labs: Patient was given results of the laboratory testing obtained today. Patient was instructed to return for the next laboratory testing in 1 week . Patient demonstrated understanding of this information and agreed to call with further questions or concerns.     New Medication:   -Start Spironolactone 12.5 mg daily.   -Increase Diltiazem tp 180 mg daily.    Patient was educated regarding newly prescribed medication, including discussion of  the indication, administration, side effects, and when to report to MD or RN. Patient demonstrated understanding of this information and agreed to call with further questions or concerns.    Return Appointment:   -Labs in 1 week.   -Keep appt with Dr. Eaton in August.   -Follow-up with CORE in 4 months with labs prior.    Patient given instructions regarding scheduling next clinic visit. Patient demonstrated understanding of this information and agreed to call with further questions or concerns.    Patient stated Amelia Michel understood all health information given and agreed to call with further questions or concerns.    Nayeli Peters RN

## 2023-05-24 NOTE — PATIENT INSTRUCTIONS
Take your medicines every day, as directed    Changes made today:  Increase cardizem to 180 mg (1 tablet) daily  Start spironolactone 12.5 mg (1/2 tablet) daily  Labs pending, will follow up with further recommendations    Monitor Your Weight and Symptoms    Contact us if you:    Gain 2 pounds in one day or 5 pounds in one week  Feel more short of breath  Notice more leg swelling  Feel lightheadeded   Change your lifestyle    Limit Salt or Sodium:  2000 mg  Limit Fluids:  2000 mL or approximately 64 ounces  Eat a Heart Healthy Diet  Low in saturated fats  Stay Active:  Aim to move at least 150 minutes every  week         To Contact us    During Business Hours:  [phone], option # 1      After hours, weekends or holidays:   [phone], Option #4  Ask to speak to the On-Call Cardiologist. Inform them you are a CORE/heart failure patient at the [facility].     Use [employer] allows you to communicate directly with your heart team through secure messaging.  [employer] can be accessed any time on your phone, computer, or tablet.  If you need assistance, we'd be happy to help!         Keep your Heart Appointments:    Labs in 1 week     8/8/23 10:30 am Dr. Angelito KNOWLES in 4 months, sooner if needed     Please consider attending our virtual support group which is held monthly. Please reach out to Jesus at [phone] for more information if you are interested in attending.       2023 dates:    Monday, May 1st, 1-2pm (Topic: CORE clinic)  Monday, June 5th, 1-2pm (Topic: Exercise and Cardiac Rehab: An Essential Tool in Managing Heart Failure)  Monday, July 3rd, 1-2pm  Monday, August 7th, 1-2pm  Monday, September 11th, 1-2pm  Monday, October 2nd, 1-2pm  Monday, November 6th, 1-2pm  Monday, December 4th, 1-2pm

## 2023-05-24 NOTE — PROGRESS NOTES
[facility] Cardiology   [facility]      HPI:   Ms. 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 She was recently admitted 11/19/-12/5/22 with ADHF and frequent NSVT, now s/p VT ablation. She presents to [facility] for hospital follow up.     With regards to her cardiac conditions, as noted, Ms. Michel presented to [facility] on 11/19 with MONTALVO, BLE edema, and 8 pound weight gain over the course of one week. Chest X-ray was suspicious for pulmonary edema, NT-P BNP 1,913, Troponin T 21-->12. EDW per chart review 116-120; admission weight 128. She was admitted w/ decompensated diastolic heart failure with pictutre c/b frequent non sustained ventricular tachycardia with RHC on 11/28/22 notable for cardiogenic shock with a cardiac index of 1.2 and CO of 1.6. Etiology thought to most likely secondary to frequent non sustained ventricular tachycardia in the setting of possible restrictive/constrictive physiology given her malignancy history (albeit presumably in remission). CAD ruled on by coronary angiogram completed on 11/28/22. She underwent VT ablation on 12/1/22 and had successful resumption of her home medications with increased diuretic dosing and adddition of an SGLT2 inhibitor. She was diuresed 19 lbs to a weight of 109 lbs at time of discharge.     At CORE visit on 1/9/23 Ms. Michel was hypervolemic as  evident by weight gain, tachycardia, and peripheral edema. Review of labs demonstrated persistent hyponatremia with Na 127 and bump in Cr to 1.35. Lasix was increase 60 mg BID. Still awaiting for PA for cardiomems device. Admitted  1/24-1/29 for CHF exacerbation. Diuresed and switched to torsemide 40 mg BID. Discharged at a weight of 107 lb.     Device interrogation from 2/7 with rates 120s-140, AFL. Diltiazem increased to 240 mg daily. Since our last CORE visit in 2/2023 she has resumed torsemide 20 mg BID and spironolactone 12.5 mg daily. She was evaluated by Dr. Cope for severe TR. Admitted 4/10-4/17/23 for elective TVR with Dr. Cope.     Today, Ms. Michel reports feeling well. Her weights have been stable at 103-104 lb. She is without peripheral edema, worsening dyspnea on exertion, orthopnea, or PND. Has some MONTALVO but feels it proportional to the activity. Denies chest pain, SOB, palpitations, lightheadedness, syncope, presyncope. Remains on eliquis for CVA prophylaxis and ASA 81 mg with new valve. Increased bruising since starting on ASA. Reports that HRs in cardiac rehab are ranging 100-150 bmp. Diltiazem was reduced in hospital. Asymptomatic to increased rates.        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     VSD (ventricular septal defect)        FAMILY HISTORY:  Family History   Problem Relation Age of Onset     Breast Cancer Mother         60s     Hypertension Mother      Alzheimer Disease Mother      Cerebrovascular Disease Mother      Hypertension Father      Cerebrovascular Disease Father      Atrial fibrillation Father      Lymphoma Father      Prostate Cancer Father      Alzheimer Disease Maternal Grandmother         likely     Arthritis Maternal  Grandfather      Pneumonia Maternal Grandfather      Diabetes Paternal Grandmother        SOCIAL HISTORY:  Social History     Socioeconomic History     Marital status:      Spouse name: Romeo Michel     Number of children: 0   Occupational History     Employer: [employer]   Tobacco Use     Smoking status: Never     Smokeless tobacco: Never   Substance and Sexual Activity     Alcohol use: Not Currently     Comment: none     Drug use: No   Other Topics Concern     Parent/sibling w/ CABG, MI or angioplasty before 65F 55M? No     Social Determinants of Health     Intimate Partner Violence: Not At Risk     Fear of Current or Ex-Partner: No     Emotionally Abused: No     Physically Abused: No     Sexually Abused: No       CURRENT MEDICATIONS:  acetaminophen (TYLENOL) 325 MG tablet, Take 2 tablets (650 mg) by mouth every 4 hours as needed for other (For optimal non-opioid multimodal pain management to improve pain control.)  alendronate (FOSAMAX) 70 MG tablet, Take 1 tablet (70 mg) by mouth every 7 days (Patient taking differently: Take 70 mg by mouth every 7 days Mondays)  apixaban ANTICOAGULANT (ELIQUIS ANTICOAGULANT) 5 MG tablet, Take 1 tablet (5 mg) by mouth 2 times daily  aspirin (ASA) 81 MG chewable tablet, 1 tablet (81 mg) by Oral or NG Tube route daily  calcium carbonate 600 mg-vitamin D 400 units (CALTRATE) 600-400 MG-UNIT per tablet, Take 2 tablets by mouth daily  dexamethasone (DECADRON) 4 MG/ML injection, Inject 4 mg IM for adrenal crisis  diltiazem ER (DILT-XR) 120 MG 24 hr capsule, Take 1 capsule (120 mg) by mouth daily  docusate sodium (COLACE) 100 MG capsule, Take 100 mg by mouth 2 times daily as needed for constipation  empagliflozin (JARDIANCE) 10 MG TABS tablet, Take 1 tablet (10 mg) by mouth daily for 360 days  gabapentin (NEURONTIN) 100 MG capsule, TAKE 1 CAPSULE BY MOUTH IN THE MORNING, 1 CAPSULE BY MOUTH IN THE AFTERNOON, AND 2 CAPSULES BY MOUTH AT  BEDTIME  hydroxychloroquine (PLAQUENIL) 200 MG tablet, Take 1 tablet (200 mg) by mouth daily  loratadine (CLARITIN) 10 MG tablet, Take 10 mg by mouth daily  magnesium oxide 200 MG TABS, Take 200 mg by mouth daily bedtime  methocarbamol (ROBAXIN) 500 MG tablet, Take 1 tablet (500 mg) by mouth 3 times daily  metolazone (ZAROXOLYN) 2.5 MG tablet, Take 1 tablet (2.5 mg) by mouth as needed (ONLY take if instructed to do so by your cardiology team.)  multivitamin, therapeutic with minerals (THERA-VIT-M) TABS tablet, Take 1 tablet by mouth daily  potassium chloride ER (KLOR-CON M) 20 MEQ CR tablet, Take 3 tablets (60 mEq) by mouth daily  predniSONE (DELTASONE) 1 MG tablet, Take 3 tablets (3 mg) by mouth daily - Oral  predniSONE (DELTASONE) 5 MG tablet, 10 mg when sick for 3 days or until back to baseline for secondary adrenal insufficiency  Vitamin D (Cholecalciferol) 10 MCG (400 UNIT) TABS, Take 1 tablet by mouth daily  diltiazem ER COATED BEADS (CARDIZEM CD/CARTIA XT) 120 MG 24 hr capsule, Take 1 capsule (120 mg) by mouth daily  spironolactone (ALDACTONE) 25 MG tablet, Take 0.5 tablets (12.5 mg) by mouth daily    No current facility-administered medications on file prior to visit.      ROS:   Refer to HPI    EXAM:  /82 (BP Location: Right arm, Patient Position: Chair, Cuff Size: Adult Regular)   Pulse 89   Wt 48.2 kg (106 lb 3.2 oz)   SpO2 98%   BMI 22.98 kg/m     GENERAL: Appears comfortable, in no acute distress.   HEENT: Eye symmetrical, no discharge or icterus bilaterally. Mucous membranes moist and without lesions.  CV: tachycardic, irregular, +S1S2, systolic murmur, no rub, or gallop. JVP at clavicle    RESPIRATORY: Respirations regular, even, and unlabored. Lungs CTA throughout.   GI: Soft and non distended with normoactive bowel sounds present in all quadrants. No tenderness, rebound, guarding. No hepatomegaly.   EXTREMITIES: No peripheral edema. 2+ bilateral pedal pulses.   NEUROLOGIC: Alert and  oriented x 3. No focal deficits.   MUSCULOSKELETAL: No joint swelling or tenderness.   SKIN: No jaundice. No rashes or lesions.     Labs, reviewed with patient in clinic today:  CBC RESULTS:  Lab Results   Component Value Date    WBC 8.5 04/26/2023    WBC Canceled, Test credited 03/16/2021    RBC 2.79 (L) 04/26/2023    RBC Canceled, Test credited 03/16/2021    HGB 10.1 (L) 04/26/2023    HGB Canceled, Test credited 03/16/2021    HCT 31.1 (L) 04/26/2023    HCT Canceled, Test credited 03/16/2021     (H) 04/26/2023    MCV Canceled, Test credited 03/16/2021    MCH 36.2 (H) 04/26/2023    MCH Canceled, Test credited 03/16/2021    MCHC 32.5 04/26/2023    MCHC Canceled, Test credited 03/16/2021    RDW 14.8 04/26/2023    RDW Canceled, Test credited 03/16/2021     04/26/2023    PLT Canceled, Test credited 03/16/2021       CMP RESULTS:  Lab Results   Component Value Date     05/10/2023     (L) 11/29/2022     06/23/2021    POTASSIUM 3.7 05/10/2023    POTASSIUM 3.5 04/10/2023    POTASSIUM 3.9 07/20/2022    POTASSIUM 4.2 06/23/2021    CHLORIDE 97 (L) 05/10/2023    CHLORIDE 101 07/20/2022    CHLORIDE 102 06/23/2021    CO2 28 05/10/2023    CO2 28 07/20/2022    CO2 28 06/23/2021    ANIONGAP 14 05/10/2023    ANIONGAP 7 07/20/2022    ANIONGAP 6 06/23/2021    GLC 91 05/10/2023     (H) 04/17/2023     (H) 07/20/2022    GLC 98 06/23/2021    BUN 20.4 05/10/2023    BUN 31 (H) 07/20/2022    BUN 25 06/23/2021    CR 0.92 05/10/2023    CR 1.05 (H) 06/23/2021    GFRESTIMATED 70 05/10/2023    GFRESTIMATED 57 (L) 06/23/2021    GFRESTBLACK 67 06/23/2021    VIKTORIYA 10.1 05/10/2023    VIKTORIYA 10.0 06/23/2021    BILITOTAL 1.5 (H) 04/17/2023    BILITOTAL 1.2 06/23/2021    ALBUMIN 3.6 04/17/2023    ALBUMIN 4.4 04/20/2022    ALBUMIN 4.2 06/23/2021    ALKPHOS 115 04/17/2023    ALKPHOS 136 06/23/2021    ALT 24 04/17/2023    ALT 41 06/23/2021    AST 32 04/17/2023    AST 36 06/23/2021        INR RESULTS:  Lab Results    Component Value Date    INR 1.71 (H) 04/10/2023    INR 3.42 (H) 03/07/2018       Lab Results   Component Value Date    MAG 2.0 04/16/2023    MAG 1.9 04/12/2018     Lab Results   Component Value Date    NTBNPI 2,067 (H) 01/24/2023    NTBNPI 485 12/12/2019     Lab Results   Component Value Date    NTBNP 1,425 (H) 10/13/2021    NTBNP 1,274 (H) 05/26/2021       Cardiac Diagnostics:    5/24/2023 Echo   Interpretation Summary  Tricuspid valve replacement with 29mm [employer] Silvio Epic.  Doppler interrogation of the tricspid valve is normal.  Global and regional left ventricular function is normal with an EF of 55-60%.  The right ventricle is normal size. Global right ventricular function is  mildly to moderately reduced.  Severe biatrial enlargement is present.  Mild to moderate mitral insufficiency is present.  IVC diameter >2.1 cm collapsing <50% with sniff suggests a high RA pressure  estimated at 15 mmHg or greater.  No pericardial effusion is present.  Compared to prior, RV appears slightly better, CVP is higher now.    4/17/2023 Echo   Interpretation Summary  Minimally invasive tricuspid valve replacement with 29mm St Silvio Epic. Doppler  interrogation of the tricspid valve is normal. TV mean gradient 4-6 mmHg.  Moderate right ventricular dilation is present. Global right ventricular  function is mildly to moderately reduced.  Global and regional left ventricular function is normal with an EF of 60-65%.  Mild to moderate mitral insufficiency is present.  IVC diameter <2.1 cm collapsing >50% with sniff suggests a normal RA pressure  of 3 mmHg.  No pericardial effusion is present.  Compared to prior TVR is new.    2/7/2023 Device Interrogation   Device: [employer] [id number] Claudia XT DR MRI  Normal Device Function.  Mode: VVIR  bpm  : 8.6%  Presenting EGM: Narrow Complex tachycardia @ 139 bpm  Short V-V intervals: 0  Lead Trends Appear Stable.  Estimated battery longevity to RRT = 11 years. Battery voltage = 3.02  V.  Atrial Arrhythmia: AFL  Anticoagulant: Apixaban  Ventricular Arrhythmia: 4 episodes lasting 1-4 sec 194-245 bpm  Transmission discussed with Maria Esther Cutler NP. Orders to increase diltiazem from 180 mg daily to 240 mg daily.    12/5/2022 ECHO  Interpretation Summary  Severe tricuspid insufficiency is present.  Moderate right ventricular dilation is present. Global right ventricular  function is mildly reduced.  Global and regional left ventricular function is normal with an EF of 55-60%.  IVC diameter >2.1 cm collapsing <50% with sniff suggests a high RA pressure  estimated at 15 mmHg or greater.  No pericardial effusion is present.  No significant changes noted.    11/28/2022 RHC with coronary angiogram     Right sided filling pressures are severely elevated.    Mild elevated pulmonary hypertension.    Left sided filling pressures are moderately elevated.    Reduced cardiac output level.    Hemodynamic data has been modified in Epic per physician review.    Prox LAD lesion is 30% stenosed.     Mild non-obstructive coronary artery disease        Assessment and Plan:   Ms. 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 She was recently admitted 11/19/-12/5/22 with ADHF and frequent NSVT, now s/p VT ablation. She presents to [facility] for hospital follow up.     Overall, appearing well. Weights are very stable at 103-104 lbs. Labs from today are pending. Echo from today shows preserved LVEF and mildly improved RV function. CVP estimated at 15. Despite elevated CVP, Amelia looks euvolemic by exam. She is on torsemide 20 mg  daily. I would opt to continue this dose and resume spironolactone 12.5 mg daily. Further, will increase diltiazem to 180 mg daily for elevated heart rates. Previously was on 240 mg daily. Given resumption of spironolactone, will plan for repeat labs in 1 week. Amelia is very mindful of her sodium intake. She wears compression stockings and monitors her weights daily. She understands to call with any weight gain or changes in symptoms. Continuing with appeal for cardioMEMs.       # Acute on chronic diastolic heart failure with dilated and mildly reduced RV function (LVEF 55-60%)  Pre-Ablation: 11/28/22 RHC: CVP 24, PA 48/27/34, PCWP 23, SVO2 46, CI 1.1, CO 1.6  Post Ablation and Diuresis: 12/3/22 RHC: CVP 16, PA 41/22/28, PCWP 15, SVO2 69, CI 2.1, CO 3.0    NYHA Class III, AHA/ACC Stage C  Rate control: 89  volume status: euvolemic, continue torsemide 20 mg BID   Blood pressure control: increase Diltiazem 180 mg daily   Aldosterone antagonist: start Aldactone 12.5 mg daily   SGLT2:  Empagliflozin 10 mg daily   Evaluation of coronary arteries: 11/28/22 angiogram mild non-obstructive CAD     # Frequent, non-sustained ventricular tachycardia s/p VT Ablation (by Dr. Eaton on 12/1/22)  - Anti-arrhythmic: none, s/p VT ablation  - Stopped PTA digoxin per EP recommendations  - Anticoagulation: given history of Afib, continue Apixaban 5 mg bid  - Follow up with EP specialists (Dr. Eaton and/or Maria Esther Cutler NP)    # HTN Intolerant to Metoprolol in the past.   - Diltiazem 180 mg daily, spironolactone 12.5 mg daily       # Aflutter. History of SSS s/p PPM 12/19. Long standing history of atrial arrythmias.  - Diltiazem 180 mg daily  - Continue Eliquis.   - Follow up with EP as scheduled.      # VSD s/p repair.     # History of exudative pericardial effusion.   - Stable per CT 3/19.     # Severe TR per TTE 11/2022 s/p TVR on 4/10/23  - continue ASA 81 mg daily         Follow up:  - Labs in 1 week  - Dr. Eaton 8/2023  - CORE  in 4 months, sooner if needed          Elmira Vasquez DNP, NP-C  Advance Heart Failure  5/24/2023

## 2023-05-25 ENCOUNTER — HOSPITAL ENCOUNTER (OUTPATIENT)
Dept: CARDIAC REHAB | Facility: CLINIC | Age: 63
Discharge: HOME | End: 2023-05-25
Attending: INTERNAL MEDICINE
Payer: COMMERCIAL

## 2023-05-25 PROCEDURE — 93798 PHYS/QHP OP CAR RHAB W/ECG: CPT

## 2023-05-30 ENCOUNTER — HOSPITAL ENCOUNTER (OUTPATIENT)
Dept: CARDIAC REHAB | Facility: CLINIC | Age: 63
Discharge: HOME | End: 2023-05-30
Attending: INTERNAL MEDICINE
Payer: COMMERCIAL

## 2023-05-30 PROCEDURE — 93798 PHYS/QHP OP CAR RHAB W/ECG: CPT

## 2023-05-31 ENCOUNTER — LAB (OUTPATIENT)
Dept: LAB | Facility: CLINIC | Age: 63
End: 2023-05-31
Payer: COMMERCIAL

## 2023-05-31 DIAGNOSIS — I50.30 HEART FAILURE WITH PRESERVED EJECTION FRACTION, NYHA CLASS I (H): ICD-10-CM

## 2023-05-31 LAB
ANION GAP SERPL CALCULATED.3IONS-SCNC: 14 MMOL/L (ref 7–15)
BUN SERPL-MCNC: 23.9 MG/DL (ref 8–23)
CALCIUM SERPL-MCNC: 9.7 MG/DL (ref 8.8–10.2)
CHLORIDE SERPL-SCNC: 98 MMOL/L (ref 98–107)
CREAT SERPL-MCNC: 0.89 MG/DL (ref 0.51–1.17)
DEPRECATED HCO3 PLAS-SCNC: 26 MMOL/L (ref 22–29)
GFR SERPL CREATININE-BSD FRML MDRD: 73 ML/MIN/1.73M2
GLUCOSE SERPL-MCNC: 102 MG/DL (ref 70–99)
POTASSIUM SERPL-SCNC: 4.1 MMOL/L (ref 3.4–5.3)
SODIUM SERPL-SCNC: 138 MMOL/L (ref 136–145)

## 2023-05-31 PROCEDURE — 80048 BASIC METABOLIC PNL TOTAL CA: CPT

## 2023-05-31 PROCEDURE — 36415 COLL VENOUS BLD VENIPUNCTURE: CPT

## 2023-06-01 ENCOUNTER — MYC MEDICAL ADVICE (OUTPATIENT)
Dept: CARDIOLOGY | Facility: CLINIC | Age: 63
End: 2023-06-01
Payer: COMMERCIAL

## 2023-06-01 ENCOUNTER — HOSPITAL ENCOUNTER (OUTPATIENT)
Dept: CARDIAC REHAB | Facility: CLINIC | Age: 63
Discharge: HOME | End: 2023-06-01
Attending: INTERNAL MEDICINE
Payer: COMMERCIAL

## 2023-06-01 ENCOUNTER — PATIENT OUTREACH (OUTPATIENT)
Dept: CARDIOLOGY | Facility: CLINIC | Age: 63
End: 2023-06-01
Payer: COMMERCIAL

## 2023-06-01 DIAGNOSIS — I48.0 PAROXYSMAL ATRIAL FIBRILLATION (H): ICD-10-CM

## 2023-06-01 PROCEDURE — 93798 PHYS/QHP OP CAR RHAB W/ECG: CPT

## 2023-06-01 NOTE — TELEPHONE ENCOUNTER
Called tolu to follow up on spironolactone start.  LVM requesting call back and sent mychart.  Labs done yesterday and look stable.

## 2023-06-02 ENCOUNTER — ANCILLARY PROCEDURE (OUTPATIENT)
Dept: CARDIOLOGY | Facility: CLINIC | Age: 63
End: 2023-06-02
Attending: INTERNAL MEDICINE
Payer: COMMERCIAL

## 2023-06-02 DIAGNOSIS — R00.1 BRADYCARDIA: ICD-10-CM

## 2023-06-02 PROCEDURE — 93294 REM INTERROG EVL PM/LDLS PM: CPT | Performed by: INTERNAL MEDICINE

## 2023-06-02 PROCEDURE — 93296 REM INTERROG EVL PM/IDS: CPT

## 2023-06-06 ENCOUNTER — HOSPITAL ENCOUNTER (OUTPATIENT)
Dept: CARDIAC REHAB | Facility: CLINIC | Age: 63
Discharge: HOME | End: 2023-06-06
Attending: INTERNAL MEDICINE
Payer: COMMERCIAL

## 2023-06-06 PROCEDURE — 93798 PHYS/QHP OP CAR RHAB W/ECG: CPT

## 2023-06-06 PROCEDURE — 93797 PHYS/QHP OP CAR RHAB WO ECG: CPT | Mod: XU

## 2023-06-08 ENCOUNTER — HOSPITAL ENCOUNTER (OUTPATIENT)
Dept: CARDIAC REHAB | Facility: CLINIC | Age: 63
Discharge: HOME | End: 2023-06-08
Attending: INTERNAL MEDICINE
Payer: COMMERCIAL

## 2023-06-08 PROCEDURE — 93798 PHYS/QHP OP CAR RHAB W/ECG: CPT

## 2023-06-13 ENCOUNTER — HOSPITAL ENCOUNTER (OUTPATIENT)
Dept: CARDIAC REHAB | Facility: CLINIC | Age: 63
Discharge: HOME | End: 2023-06-13
Attending: INTERNAL MEDICINE
Payer: COMMERCIAL

## 2023-06-13 PROCEDURE — 93798 PHYS/QHP OP CAR RHAB W/ECG: CPT

## 2023-06-20 ENCOUNTER — HOSPITAL ENCOUNTER (OUTPATIENT)
Dept: CARDIAC REHAB | Facility: CLINIC | Age: 63
Discharge: HOME | End: 2023-06-20
Attending: INTERNAL MEDICINE
Payer: COMMERCIAL

## 2023-06-20 PROCEDURE — 93798 PHYS/QHP OP CAR RHAB W/ECG: CPT

## 2023-06-27 ENCOUNTER — HOSPITAL ENCOUNTER (OUTPATIENT)
Dept: CARDIAC REHAB | Facility: CLINIC | Age: 63
Discharge: HOME | End: 2023-06-27
Attending: INTERNAL MEDICINE
Payer: COMMERCIAL

## 2023-06-27 PROCEDURE — 93798 PHYS/QHP OP CAR RHAB W/ECG: CPT

## 2023-06-29 ENCOUNTER — HOSPITAL ENCOUNTER (OUTPATIENT)
Dept: CARDIAC REHAB | Facility: CLINIC | Age: 63
Discharge: HOME | End: 2023-06-29
Attending: INTERNAL MEDICINE
Payer: COMMERCIAL

## 2023-06-29 PROCEDURE — 93798 PHYS/QHP OP CAR RHAB W/ECG: CPT

## 2023-06-30 ENCOUNTER — TRANSFERRED RECORDS (OUTPATIENT)
Dept: HEALTH INFORMATION MANAGEMENT | Facility: CLINIC | Age: 63
End: 2023-06-30
Payer: COMMERCIAL

## 2023-06-30 PROCEDURE — 93244 EXT ECG>48HR<7D REV&INTERPJ: CPT | Performed by: INTERNAL MEDICINE

## 2023-07-03 ENCOUNTER — ALLIED HEALTH/NURSE VISIT (OUTPATIENT)
Dept: CARDIOLOGY | Facility: CLINIC | Age: 63
End: 2023-07-03
Payer: COMMERCIAL

## 2023-07-03 DIAGNOSIS — R00.1 BRADYCARDIA: ICD-10-CM

## 2023-07-03 DIAGNOSIS — I47.19 ATRIAL TACHYCARDIA (H): ICD-10-CM

## 2023-07-03 DIAGNOSIS — I50.33 ACUTE ON CHRONIC DIASTOLIC HEART FAILURE (H): ICD-10-CM

## 2023-07-03 DIAGNOSIS — I48.92 ATRIAL FLUTTER, UNSPECIFIED TYPE (H): ICD-10-CM

## 2023-07-06 ENCOUNTER — HOSPITAL ENCOUNTER (OUTPATIENT)
Dept: CARDIAC REHAB | Facility: CLINIC | Age: 63
Discharge: HOME | End: 2023-07-06
Attending: INTERNAL MEDICINE
Payer: COMMERCIAL

## 2023-07-06 PROCEDURE — 93798 PHYS/QHP OP CAR RHAB W/ECG: CPT

## 2023-07-11 ENCOUNTER — HOSPITAL ENCOUNTER (OUTPATIENT)
Dept: CARDIAC REHAB | Facility: CLINIC | Age: 63
Discharge: HOME | End: 2023-07-11
Attending: INTERNAL MEDICINE
Payer: COMMERCIAL

## 2023-07-11 PROCEDURE — 93798 PHYS/QHP OP CAR RHAB W/ECG: CPT

## 2023-07-13 ENCOUNTER — HOSPITAL ENCOUNTER (OUTPATIENT)
Dept: CARDIAC REHAB | Facility: CLINIC | Age: 63
Discharge: HOME | End: 2023-07-13
Attending: INTERNAL MEDICINE
Payer: COMMERCIAL

## 2023-07-13 PROCEDURE — 93798 PHYS/QHP OP CAR RHAB W/ECG: CPT

## 2023-07-18 ENCOUNTER — HOSPITAL ENCOUNTER (OUTPATIENT)
Dept: CARDIAC REHAB | Facility: CLINIC | Age: 63
Discharge: HOME | End: 2023-07-18
Attending: INTERNAL MEDICINE
Payer: COMMERCIAL

## 2023-07-18 PROCEDURE — 93798 PHYS/QHP OP CAR RHAB W/ECG: CPT

## 2023-07-20 ENCOUNTER — HOSPITAL ENCOUNTER (OUTPATIENT)
Dept: CARDIAC REHAB | Facility: CLINIC | Age: 63
Discharge: HOME | End: 2023-07-20
Attending: INTERNAL MEDICINE
Payer: COMMERCIAL

## 2023-07-20 PROCEDURE — 93798 PHYS/QHP OP CAR RHAB W/ECG: CPT

## 2023-07-25 ENCOUNTER — HOSPITAL ENCOUNTER (OUTPATIENT)
Dept: CARDIAC REHAB | Facility: CLINIC | Age: 63
Discharge: HOME | End: 2023-07-25
Attending: INTERNAL MEDICINE
Payer: COMMERCIAL

## 2023-07-25 PROCEDURE — 93798 PHYS/QHP OP CAR RHAB W/ECG: CPT

## 2023-07-25 NOTE — PROGRESS NOTES
Care Coordinator Progress Note     Admission Date/Time:  5/29/2017  Attending MD:  Sundar Wood MD  Data  Chart reviewed, discussed with interdisciplinary team.   Patient was admitted for:    Cardiac arrest (H)  Cardiac arrest with ventricular fibrillation (H)  Cardiogenic shock (H).  Assessment  Per NP, pt will need to wait 2 weeks for ICD placement and will need to discharge from the hospital with a life vest. This writer met with pt and gave informational pamphlet about life vest, pt is in agreement with this plan. This writer notified [facility]  (P: [phone], F: [phone]) to notify that referral will be made. Awaiting signed script.  PM & R seeing patient this afternoon for placement recommendation, PT currently recommending ARU.   Pt currently receiving cardiac telemetry monitoring and tube feedings inpatient.     Plan  Anticipated Discharge Date: 6/16/2017  Anticipated Discharge Plan: Discharge to ARU.  CC will continue to monitor patient's medical condition and progress towards discharge.  Rose Mayes RN BSN  6C Unit Care Coordinator  Phone number: [phone]  Pager: [phone]            Delivery

## 2023-07-27 ENCOUNTER — HOSPITAL ENCOUNTER (OUTPATIENT)
Dept: CARDIAC REHAB | Facility: CLINIC | Age: 63
Discharge: HOME | End: 2023-07-27
Attending: INTERNAL MEDICINE
Payer: COMMERCIAL

## 2023-07-27 PROCEDURE — 93798 PHYS/QHP OP CAR RHAB W/ECG: CPT

## 2023-08-01 ENCOUNTER — HOSPITAL ENCOUNTER (OUTPATIENT)
Dept: CARDIAC REHAB | Facility: CLINIC | Age: 63
Discharge: HOME | End: 2023-08-01
Attending: INTERNAL MEDICINE
Payer: COMMERCIAL

## 2023-08-01 PROCEDURE — 93798 PHYS/QHP OP CAR RHAB W/ECG: CPT

## 2023-08-01 PROCEDURE — 93797 PHYS/QHP OP CAR RHAB WO ECG: CPT

## 2023-08-02 ENCOUNTER — HOSPITAL ENCOUNTER (OUTPATIENT)
Dept: ULTRASOUND IMAGING | Facility: CLINIC | Age: 63
Discharge: HOME | End: 2023-08-02
Attending: INTERNAL MEDICINE
Payer: COMMERCIAL

## 2023-08-02 ENCOUNTER — HOSPITAL ENCOUNTER (OUTPATIENT)
Dept: BONE DENSITY | Facility: CLINIC | Age: 63
Discharge: HOME | End: 2023-08-02
Attending: INTERNAL MEDICINE
Payer: COMMERCIAL

## 2023-08-02 DIAGNOSIS — M81.8 STEROID-INDUCED OSTEOPOROSIS: ICD-10-CM

## 2023-08-02 DIAGNOSIS — T38.0X5A STEROID-INDUCED OSTEOPOROSIS: ICD-10-CM

## 2023-08-02 DIAGNOSIS — E27.49 SECONDARY ADRENAL INSUFFICIENCY (H): ICD-10-CM

## 2023-08-02 DIAGNOSIS — E04.2 NONTOXIC MULTINODULAR GOITER: ICD-10-CM

## 2023-08-02 PROCEDURE — 77080 DXA BONE DENSITY AXIAL: CPT

## 2023-08-02 PROCEDURE — 76536 US EXAM OF HEAD AND NECK: CPT

## 2023-08-03 ENCOUNTER — HOSPITAL ENCOUNTER (OUTPATIENT)
Dept: CARDIAC REHAB | Facility: CLINIC | Age: 63
Discharge: HOME | End: 2023-08-03
Attending: INTERNAL MEDICINE
Payer: COMMERCIAL

## 2023-08-03 PROCEDURE — 93798 PHYS/QHP OP CAR RHAB W/ECG: CPT

## 2023-08-06 NOTE — PROGRESS NOTES
Electrophysiology Clinic Note  HPI:   Ms. Michel is a 62 year old female who has a past medical history significant for VSD s/p repair 1969, RA, HTN, insomnia, atrial tachyarrhythmias, cardiac arrest May 2017, SSS s/p PPM 12/2019,  DLBCL, VT s/p VT ablation 12/1/2017and exudative pericardial effusion, and severe TR s/p TVT 4/10/23.      She was admitted from 5/15/17-5/23/17 with atrial tachyarrhythmias (SVT, AF, AFL) with symptoms of palpitations, fatigue, and nausea. An echo showed LVEF 40-45%, mildly reduced RVEF, moderate biatrial enlargement, mild mitral regurgitation, and moderate tricuspid regurgitation. . She was started on Eliquis and underwent a BRE/DCCV on 5/17/17 c/b hypotension and recurrent arrhythmia.She was started on Sotalol and chemically cardioverted. However, she had nausea and thought it was from the Sotalol so this was stopped. She reverted back to AF/AFL and a rate control strategy was adopted which was difficult given symptoms so she started amiodarone. CMRI  showed LVEF 55%, mildly dilated RV with focal area of dyskinesis in RVOT, severe bi-atrial enlargement, severe TR, mild/moderate MR, and DE at RV septal insertion site. RFA was discussed but was defered as patient had a UTI at the time with ESBL.     She was readmitted on 6/19/17-8/4/17 after suffering an out of hospital cardiac arrest.  Upon EMS arrival, she was in VF. She was externally defibrillated and had ROSC in the field. She was intubated and brought to [facility] found to be in escape rhythm 43 bpm. She was taken emergently to cath lab where coronary angiogram showed normal coronary arteries. Temporary pacemaker was placed in RA given sinus arrest. She was also placed on therapeutic hypothermia. She had been having nausea, diarrhea, and intermittent vomiting over the last week and generally had not been feeling well over the last month and also had saddle anesthesia with lower extremity numbness that had been evaluated by neuro as an  "outpatient.  Ultimately found to have DLBCL started on chemo cycles. 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     Her DLBCL was treated with R-EPOCH for one cycle while in the hospital complicated by cytopenias followed by 5 cycles of R-CHOP finished the cycles in December of 2017 currently on surveillance scans. She had a pericardial effusion for which she is on colchicine which was discontinued at the end of 6/2018.      She has then followed intermittently with EP in clinic. She had some recurrent AF in 4/2018 requiring ED visit with DCCV with recurrence and another DCCV. We restarted digoxin 4/4/18 after which she had one episode of AF s/p DCCV and has since maintained sinus rhythm and reported good symptomatic control. She was seen in 5/2018 and reported having a feeling of her heart \"rolling\" daily lasting about about 10 sec at the most. The last time she required DCCV was in 4/12/2018. Her cMRI had shown some organization of her [ericardial effusion) and she has been initiated on colchicine. Chest CT in 9/2018 showed no evidence of lymphoma recurrence and improved pericardial effusion. Last EP follow up was in 2/2019 and she was doing well without issues.    She then presented 12/12/2019 with 1 day history palpitations, headaches, and nausea. She also endorses some dizziness when walking but not at rest. She was found to be bradycardic with intermittent junctional rhythm into 30's. Electrolytes and renal function stable. Her atenolol and digoxin were held. She continued to have junctional/bradycardia and decision was made to pursue PPM.    EP Visit 4/10/20: She presents today for follow up. She reports feeling well. She states she does have intermittent episodes of palpitations, that have not been overly bothersome to her. Device site well healed. She denies any chest pain/pressures, dizziness, " lightheadedness, worsening shortness of breath, leg/ankle swelling, PND, orthopnea, or syncopal symptoms. Device site is reported well healed. Device transmission shows presenting rhythm is AP/VS @ 60 bpm. 1 NSVT, 6 Fast A&V and 272 AF episodes recorded. All EGMs show AF/AT with RVR. AF burden= 75.3%. She is taking eliquis. Normal device function. AP= 26% and = 13%. No short V-V intervals recorded. Lead trends appear stable. Battery estimates 14.2 years to OZZY. Current cardiac medications include: Elqiuis, Atenolol, Digoxin, Lasix, and Spironolactone.     EP Visit 10/9/20: She presents today for follow up. She reports feeling well. She denies any chest pain/pressures, dizziness, lightheadedness, worsening shortness of breath, leg/ankle swelling, PND, orthopnea, palpitations, or syncopal symptoms. Device interrogation shows normal pacemaker function. 2 Fast A&V and 1 NSVT episodes recorded since last remote transmission. Intrinsic rhythm = Atrial Flutter, w/ VS @ 88 bpm. AF burden = 85.9%. Pt is on Eliquis. AP = 16.7%.  = 23.6%.  Estimated battery longevity to OZZY = 13.6 years. No short v-v intervals recorded. Lead trends appear stable. Presenting 12 lead ECG shows AF/AFL and  Vent Rate 69 bpm,  ms, QTc 473 ms. Echo shows LVEF 60-65%, mildly dilated RV with normal function, moderate TR, unchanged from prior echo. Current cardiac medications include: Elqiuis, Atenolol, Digoxin, Lasix, and Spironolactone.     EP visit 8/25/21: She presents today for follow up. She had some more RVR and her digoxin was resumed. She has been having ongoing fatigue which is now improved with better rate control. She denies any chest pain/pressures, dizziness, lightheadedness, worsening shortness of breath, leg/ankle swelling, PND, orthopnea, palpitations, or syncopal symptoms. Device interrogations shows normal device function, stable lead parameters, intrinsic is AFL/VS/ at 60 bpm, HR histograms adequate, 100% AF/AFL since  "11/2020, AP <0.1%,  49.3%. Echo today shows normal LVEF 60-65%, mildly decreased RV function, moderate TI, and dilated IVC. Current cardiac medications include: Elqiuis, Atenolol, Digoxin, Lasix, and Spironolactone. SHe was asymptomatic with her AFL with preserved EF, hence we continued conservative management.    EP visit 8/22/22: She presents today for follow-up. Patient feels she is \"in failure\" because she feels bloated and liver under tension. TTE done today shows a normal EF, dilated IVC with mod to sev TR and some pulmonary HTN, severe biatrial enlargement. Creatinine 1.09 7/20/22, weight has not increased much but she feels like it did  Otherwise no bleeding issues with Eliquis.   Her device interrogation today shows that she is V pacing 77% of the time, had runs been as nonsustained VT but available EGM suggest atrial fibrillation with RVR, battery life 11 years.  Her EKG today shows a baseline of atrial fibrillation with ventricular pacing at 68 bpm.AT that visit her lasix was adjusted temporarly.    EP Visit 2/1/23: She presents today for follow-up after she was admitted to the hospital in December 2023 for heart failure exacerbation and RVR from her chronic AF. Her diuretics were optimized She is feeling better since her discharge.  She had runs of VT in the hospital and underwent a VT ablation on 12/1/22. She is not feeling any of the symptoms she was having during the VT runs in the hospital. She has had no recurrences of VT. While in the hospital, she met with Dr Walker in the hospital who wants to keep her HR more permissive. Today her HR is at 92 bpm at rest.NSVT runs on device are more likely AF with RVR. No bleeding issues with Eliquis.    She presents today for follow up. She underwent TVR on 4/10/23. She is recovering well post operatively. She reports feeling well overall. She is asymptomatic with her SVT and NSVT. She denies chest discomfort, palpitations, abdominal fullness/bloating or " peripheral edema, shortness of breath, paroxysmal nocturnal dyspnea, orthopnea, lightheadedness, dizziness, pre-syncope, or syncope. A zio patch monitor from 6/30/23-7/7/23 showed 6 NSVT up to 7.3 seconds and 1575 PSVT up to 2 minutes and 3 seconds with no symptoms reported. Device interrogation shows normal device function, stable lead parameters, 33.7% AF burden, 412 NSVT some of the episodes have RR irregularity noted, and AP 49.4%,  60.7%. Current cardiac medications include: Diltiazem, Spironolactone, ASA, and Eliquis.         PAST MEDICAL HISTORY:  Past Medical History:   Diagnosis Date    Arthritis     Cardiac arrest (H)     History of blood clots 2017    PICC line Right arm     History of chemotherapy     History of transfusion     Hypertension     Neuropathy     Physical deconditioning     PONV (postoperative nausea and vomiting)     Positive PPD, 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    VSD (ventricular septal defect)        CURRENT MEDICATIONS:  Current Outpatient Medications   Medication Sig Dispense Refill    acetaminophen (TYLENOL) 325 MG tablet Take 2 tablets (650 mg) by mouth every 4 hours as needed for other (For optimal non-opioid multimodal pain management to improve pain control.) 100 tablet 1    alendronate (FOSAMAX) 70 MG tablet Take 1 tablet (70 mg) by mouth every 7 days (Patient taking differently: Take 70 mg by mouth every 7 days Mondays) 12 tablet 3    apixaban ANTICOAGULANT (ELIQUIS ANTICOAGULANT) 5 MG tablet Take 1 tablet (5 mg) by mouth 2 times daily 180 tablet 3    aspirin (ASA) 81 MG chewable tablet 1 tablet (81 mg) by Oral or NG Tube route daily 30 tablet 2    calcium carbonate 600 mg-vitamin D 400 units (CALTRATE) 600-400 MG-UNIT per tablet Take 2 tablets by mouth daily      dexamethasone (DECADRON) 4 MG/ML injection Inject 4 mg IM for adrenal crisis 2 mL 3    diltiazem ER (DILT-XR) 120 MG 24 hr capsule Take 1 capsule (120 mg) by mouth daily 90 capsule 1    diltiazem ER (DILT-XR) 180 MG 24 hr capsule Take  1 capsule (180 mg) by mouth daily 90 capsule 3    diltiazem ER COATED BEADS (CARDIZEM CD/CARTIA XT) 120 MG 24 hr capsule Take 1 capsule (120 mg) by mouth daily 30 capsule 2    docusate sodium (COLACE) 100 MG capsule Take 100 mg by mouth 2 times daily as needed for constipation      empagliflozin (JARDIANCE) 10 MG TABS tablet Take 1 tablet (10 mg) by mouth daily for 360 days 90 tablet 3    gabapentin (NEURONTIN) 100 MG capsule TAKE 1 CAPSULE BY MOUTH IN THE MORNING, 1 CAPSULE BY MOUTH IN THE AFTERNOON, AND 2 CAPSULES BY MOUTH AT BEDTIME 360 capsule 0    hydroxychloroquine (PLAQUENIL) 200 MG tablet Take 1 tablet (200 mg) by mouth daily 90 tablet 2    loratadine (CLARITIN) 10 MG tablet Take 10 mg by mouth daily      magnesium oxide 200 MG TABS Take 200 mg by mouth daily bedtime      methocarbamol (ROBAXIN) 500 MG tablet Take 1 tablet (500 mg) by mouth 3 times daily 90 tablet 0    metolazone (ZAROXOLYN) 2.5 MG tablet Take 1 tablet (2.5 mg) by mouth as needed (ONLY take if instructed to do so by your cardiology team.) 5 tablet 0    multivitamin, therapeutic with minerals (THERA-VIT-M) TABS tablet Take 1 tablet by mouth daily 30 each 0    potassium chloride ER (KLOR-CON M) 20 MEQ CR tablet Take 3 tablets (60 mEq) by mouth daily 270 tablet 3    predniSONE (DELTASONE) 1 MG tablet Take 3 tablets (3 mg) by mouth daily - Oral 270 tablet 1    predniSONE (DELTASONE) 5 MG tablet 10 mg when sick for 3 days or until back to baseline for secondary adrenal insufficiency 30 tablet 3    spironolactone (ALDACTONE) 25 MG tablet Take 0.5 tablets (12.5 mg) by mouth daily 30 tablet 3    spironolactone (ALDACTONE) 25 MG tablet Take 0.5 tablets (12.5 mg) by mouth daily 45 tablet 1    Vitamin D (Cholecalciferol) 10 MCG (400 UNIT) TABS Take 1 tablet by mouth daily         PAST SURGICAL HISTORY:  Past Surgical History:   Procedure Laterality Date    ANESTHESIA CARDIOVERSION N/A 5/17/2017    Procedure: ANESTHESIA CARDIOVERSION;  ANESTHESIA  CARDIOVERSION;  Surgeon: GENERIC ANESTHESIA PROVIDER;  Location: [facility] OR    ANESTHESIA CARDIOVERSION  3/12/2018    Procedure: ANESTHESIA CARDIOVERSION;;  Surgeon: GENERIC ANESTHESIA PROVIDER;  Location: [facility] OR    ANESTHESIA CARDIOVERSION N/A 3/23/2018    Procedure: ANESTHESIA CARDIOVERSION;  Anesthesia Cardioversion ;  Surgeon: GENERIC ANESTHESIA PROVIDER;  Location: [facility] OR    ANESTHESIA CARDIOVERSION N/A 4/12/2018    Procedure: ANESTHESIA CARDIOVERSION;  Cardioversion;  Surgeon: GENERIC ANESTHESIA PROVIDER;  Location: [facility] OR    ARTHRODESIS WRIST  2000    Right wrist    BONE MARROW ASPIRATE &BIOPSY  7/12/2017         BONE MARROW BIOPSY W/ ASPIRATION  07/12/2017    CARDIOVERSION      5/17/17, 3/12/18, 3/23/18, 4/14/18,     COLONOSCOPY N/A 11/19/2019    Procedure: Colonoscopy, With Polypectomy And Biopsy;  Surgeon: Pj Vasques MD;  Location: [facility]    CV CORONARY ANGIOGRAM N/A 11/28/2022    Procedure: Coronary Angiogram;  Surgeon: Sundar Wood MD;  Location:  HEART CARDIAC CATH LAB    CV RIGHT HEART CATH MEASUREMENTS RECORDED N/A 11/28/2022    Procedure: Right Heart Catheterization;  Surgeon: Sundar Wood MD;  Location:  HEART CARDIAC CATH LAB    EP ABLATION PVC N/A 12/1/2022    Procedure: Ablation Premature Ventricular Contractions;  Surgeon: Juan Eaton MD;  Location:  HEART CARDIAC CATH LAB    EP PACEMAKER N/A 12/13/2019    Procedure: EP Pacemaker;  Surgeon: Pj Trent MD;  Location:  HEART CARDIAC CATH LAB    EXCISE LESION UPPER EXTREMITY Left 5/22/2018    Procedure: EXCISE LESION UPPER EXTREMITY;  Left Arm Wound Excision And Closure, Possible Submuscular Transposition of Ulnar Nerve  (Choice Anesthesia);  Surgeon: Kevin Sheldon MD;  Location:  OR    FOOT SURGERY      4 left and 2 right    FOOT SURGERY      4 Left and 2 Right     IMPLANT PACEMAKER  12/13/2019    Dr China Trent   [facility] Cath lab     IMPLANT PACEMAKER      RELEASE CARPAL TUNNEL Bilateral      RELEASE CARPAL TUNNEL BILATERAL      REPAIR VALVE TRICUSPID MINIMALLY INVASIVE N/A 4/10/2023    Procedure: MINIMALLY INVASIVE TRICUSPID VALVE REPLACEMENT USING 29MM ST. NILAM EPIC VALVE, FEMORAL CANNULATION AND RIGHT GROIN CUTDOWN, CARDIOPULMONARY BYPASS, TRANSESOPHAGEAL ECHOCARDIOGRAM PERFORMED BY ANESTHESIA STAFF;  Surgeon: Chilango Cope MD;  Location: [facility] OR    REPAIR VENTRICULAR SEPTAL DEFECT  1969    TRANSPOSITION ULNAR NERVE (ELBOW) Left 5/22/2018    Procedure: TRANSPOSITION ULNAR NERVE (ELBOW);;  Surgeon: Kevin Sheldon MD;  Location: [facility] OR    ULNAR NERVE TRANSPOSITION Left 05/22/2018    VSD REPAIR  1969    ZZC FUSION FOOT BONES,SUBTALAR Right 10/20/2020    Procedure: RIGHT SUBTALAR JOINT FUSION AND CALCANEOCUBOID FUSION;  Surgeon: Nemesio Crow MD;  Location: [facility];  Service: Orthopedics       ALLERGIES:     Allergies   Allergen Reactions    Amiodarone Other (See Comments) and Difficulty breathing     Lethargic and had trouble breathing- occurred in 2017    Blood Transfusion Related (Informational Only) Hives     Hives with platelets. Give benadryl premedication.    Metoprolol Other (See Comments)     Pt and  report that metoprolol does not work for her and also reports feeling unwell with this medication. She has been able to tolerate atenolol, which as worked in controlling her HR.     Other [No Clinical Screening - See Comments]      Penicillin Allergy Skin Test not performed, see antimicrobial management team progress note 7/5/17.      Penicillins      Childhood reaction, concern for tongue swelling    Tape [Adhesive Tape] Rash       FAMILY HISTORY:  Family History   Problem Relation Age of Onset    Breast Cancer Mother         60s    Hypertension Mother     Alzheimer Disease Mother     Cerebrovascular Disease Mother     Hypertension Father     Cerebrovascular Disease Father     Atrial fibrillation Father     Lymphoma Father     Prostate Cancer Father     Alzheimer Disease  "Maternal Grandmother         likely    Arthritis Maternal Grandfather     Pneumonia Maternal Grandfather     Diabetes Paternal Grandmother        SOCIAL HISTORY:  Social History     Tobacco Use    Smoking status: Never    Smokeless tobacco: Never   Vaping Use    Vaping Use: Never used   Substance Use Topics    Alcohol use: Not Currently     Comment: none    Drug use: No         Exam:  /81 (BP Location: Right arm, Patient Position: Chair, Cuff Size: Adult Regular)   Pulse 86   Ht 1.48 m (4' 10.27\")   Wt 47.9 kg (105 lb 9.6 oz)   SpO2 98%   BMI 21.87 kg/m    GENERAL APPEARANCE: alert and no distress  HEENT: no icterus, no xanthelasmas, normal pupil size and reaction, normal palate, mucosa moist, no central cyanosis  NECK: Supple.  RESPIRATORY: lungs clear to auscultation - no rales, rhonchi or wheezes, no use of accessory muscles, no retractions, respirations are unlabored, normal respiratory rate  CARDIOVASCULAR:Regular, no murmur  ABDOMEN: soft, non tender, bowel sounds normal, non-distended  EXTREMITIES: peripheral pulses normal, no edema  NEURO: alert and oriented to person/place/time, normal speech, gait and affect  SKIN: no ecchymoses, no rashes  PSYCH: normal affect, cooperative      Testing/Procedures:  9/11/17 ECHOCARDIOGRAM:   Interpretation Summary  Left ventricular function, chamber size, wall motion, and wall thickness are  normal.The EF is 60-65% (traced 60%.) GLS -18%.  The right ventricle is normal size and normal in function. The RV is mildly dilated.  Moderate tricuspid insufficiency is present.  Mild pulmonary hypertension is present (esimated PASP 55 mmHg including RA pressure.)  Dilation of the inferior vena cava is present with abnormal respiratory  variation in diameter (esitimated RAP 15 mmHg.)  This study was compared with the study from 8/31/17: TR appears slightly decreased from the earlier study.     7/24/17 CMRI:     1. The LV is normal in cavity size and wall thickness. The " global systolic function is normal. The LVEF is  64%. There are no regional wall motion abnormalities. No evidence of myocardial iron overload.   2. The RV is normal in cavity size. The global systolic function is normal. The RVEF is 59%.   3. There is moderate dilation of bilateral atria.   4. Tricuspid regurgitation is present, difficult to quantify due to image quality.   5. Late gadolinium enhancement imaging shows RV insertion site hyperenhancement, a non-specific finding  seen in patients with RV volume/pressure overload.   6. There is a moderate right pleural effusion and small left pleural effusion.    CONCLUSIONS: Normal Bi-Ventricular systolic function, LVEF 64% and RVE 59%. There is no evidence of  infiltrative disease. Compared to the MRI from 5/15/2017, there is no significant change.      7/2017 BRE:  Interpretation Summary  There is a small mass (0.7 cm by 0.2 cm) on the ventricular side of the right  coronary cusp. There is a second mass (0.4 cm by 0.2 cm) observed in the LVOT,  attached to the mitro-aortic curtain. There is a third mass on the noncoronary  cusp that measures 0.4 cm by 0.2 cm that could be a healing vegetation. These  findings are compatible with endocarditis if clinical picture supports.  Right ventricular function, chamber size, wall motion, and thickness are  normal.  This study was compared with the study from 7/10/2017.  There is detection of masses on the aortic valve and LVOT.     4/12/18 CMRI:  1. The LV is normal in cavity size and wall thickness. The global systolic function is normal. The LVEF is  54%. There are no regional wall motion abnormalities.  2. The RV is mildly dilated in cavity size. The global systolic function is normal. The RVEF is 49%.   3. Both atria are severely enlarged.  4. There is at least moderate, possibly severe tricuspid regurgitation.   5. Late gadolinium enhancement imaging shows hyperenhancement in the RV insertion site consistent with  RV  pressure/volume overload.   6. There is a loculated pericardial effusion along the basal lateral and basal to mid inferior LV walls,  and along the inferior RV wall. it appears exudative in nature, and is beginning to organize. There is  diffuse pericardial enhancement.  7. There is no intracardiac thrombus or mass.  8. There is a small right pleural effusion.    9/26/18 CHEST CT:                                                                   IMPRESSION: In this patient with a history of lymphoma:  1. No evidence of disease recurrence, consistent with complete  response per Lugano criteria.  2. Stable cardiomegaly. Improved pericardial effusion.  3. Early contrast phase with heterogeneous visceral enhancement in the  abdomen, likely secondary to cardiac dysfunction.    10/9/20 ECHOCARDIOGRAM:  Interpretation Summary  VSD s/p repair in 1969  Global and regional left ventricular function is normal with an EF of 60-65%.  No flow acceleration is seen across the septum.  Global right ventricular function is normal. Mild right ventricular dilation  is present.  There is moderate tricuspid regurgitation.  The estimated PA systolic pressure is 32 mmHg but this is likely to be  underestimated due to the presence of multiple jets.  This study was compared with the study from 09/11/2017. There has been no  change in the severity of the tricuspid regurgitation or the RV size/function.  8/2021 ECHOCARDIOGRAM:  terpretation Summary  H/O of VSD repair in 1969  The visual ejection fraction is 60-65%. No wall motion abnormalities. Global  right ventricular function is mildly reduced.  Moderate tricuspid insufficiency is present.  There is a right ventricular right atrial pressure difference of 24mmHg.  IVC diameter >2.1 cm collapsing <50% with sniff suggests a high RA pressure  estimated at 15 mmHg or greater.  No pericardial effusion is present.    TTE 8/26/22:  Interpretation Summary  Left ventricular function, chamber  size, wall motion, and thickness are normal.  Severe biatrial enlargement is present.  Moderate to severe tricuspid insufficiency is present.  Dilation of the inferior vena cava is present with abnormal respiratory  variation in diameter.  No pericardial effusion is present.    Assessment and Plan:   Ms. Michel is a 62 year old female who has a past medical history significant for VSD s/p repair 1969, RA, HTN, insomnia, atrial tachyarrhythmias, cardiac arrest May 2017, SSS s/p PPM 12/2019,  DLBCL, VT s/p VT ablation 12/1/2017and exudative pericardial effusion, and severe TR s/p TVT 4/10/23.     RV VT, focal:  S/p Vt ablation on 12/1/2022. No recurrences of sustained VT.  Does have short runs of NSVT.    Sick Sinus Syndrome:   Atrial Fibrillation:  1. Stroke Prophylaxis:  CHADSVASC=+HTN, +gender  2, corresponding to a 2.2% annual stroke / systemic emolism event rate. indicating need for long term oral anticoagulation.  She is on Eliquis. No bleeding issues.   2. Rate Control: Continue diltiazem, increased to 240 CD daily.  3. Rhythm Control: Cardioversion, Antiarrhythmics and/or ablation are options for rhythm control. She has had several DCCV last in 4/12/19. Notably, she had possible side effects from Sotalol (however, likely was due to underlying illness at the time and not from medication since she was found to have DBCL).  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  However post TVR has been having SR with 33% AF burden on device.  No intervention needed at this time per patient preference and asymptomatic.  4. Had tachy-shelton syndrome and underwent PPM implant in 12/2019. Normal device function with stable lead parameters. She will have continued follow up with Device clinic per routine.     Follow-up in 6 months with EP KEVIN with zio for one week    The patient states understanding and is agreeable with plan.   Juan Eaton MD [facility]  Cardiology - Electrophysiology    Total  time spent on patient visit, reviewing notes, imaging, labs, orders, and completing necessary documentation:45 minutes.  >50% of visit spent on counseling patient and/or coordination of care.

## 2023-08-08 ENCOUNTER — ANCILLARY PROCEDURE (OUTPATIENT)
Dept: CARDIOLOGY | Facility: CLINIC | Age: 63
End: 2023-08-08
Attending: INTERNAL MEDICINE
Payer: COMMERCIAL

## 2023-08-08 VITALS
DIASTOLIC BLOOD PRESSURE: 81 MMHG | HEIGHT: 58 IN | HEART RATE: 86 BPM | SYSTOLIC BLOOD PRESSURE: 124 MMHG | OXYGEN SATURATION: 98 % | BODY MASS INDEX: 22.17 KG/M2 | WEIGHT: 105.6 LBS

## 2023-08-08 DIAGNOSIS — I48.0 PAROXYSMAL ATRIAL FIBRILLATION (H): ICD-10-CM

## 2023-08-08 DIAGNOSIS — I07.1 SEVERE TRICUSPID REGURGITATION: ICD-10-CM

## 2023-08-08 DIAGNOSIS — I50.22 CHRONIC SYSTOLIC HEART FAILURE (H): ICD-10-CM

## 2023-08-08 DIAGNOSIS — Q21.0 VSD (VENTRICULAR SEPTAL DEFECT): ICD-10-CM

## 2023-08-08 DIAGNOSIS — I47.19 ATRIAL TACHYCARDIA (H): ICD-10-CM

## 2023-08-08 DIAGNOSIS — R00.1 BRADYCARDIA: ICD-10-CM

## 2023-08-08 PROCEDURE — 99215 OFFICE O/P EST HI 40 MIN: CPT | Mod: 25 | Performed by: INTERNAL MEDICINE

## 2023-08-08 PROCEDURE — G0463 HOSPITAL OUTPT CLINIC VISIT: HCPCS | Performed by: INTERNAL MEDICINE

## 2023-08-08 PROCEDURE — 93280 PM DEVICE PROGR EVAL DUAL: CPT | Performed by: INTERNAL MEDICINE

## 2023-08-08 RX ORDER — DILTIAZEM HYDROCHLORIDE 240 MG/1
240 CAPSULE, EXTENDED RELEASE ORAL DAILY
Qty: 90 CAPSULE | Refills: 3 | Status: SHIPPED | OUTPATIENT
Start: 2023-08-08 | End: 2024-05-23

## 2023-08-08 ASSESSMENT — PAIN SCALES - GENERAL: PAINLEVEL: NO PAIN (0)

## 2023-08-08 NOTE — NURSING NOTE
Cardiac Monitors: Patient was instructed regarding the indication, function, care and prompt return of a holter  monitor. The monitor was placed on the patient with instructions regarding care of the skin electrodes and monitor, as well as documentation in the patient diary. Patient demonstrated understanding of this information and agreed to call with further questions or concerns.    Med Reconcile: Reviewed and verified all current medications with the patient. The updated medication list was printed and given to the patient.    Return Appointment: Patient given instructions regarding scheduling next clinic visit. Patient demonstrated understanding of this information and agreed to call with further questions or concerns.    Medication Change: Patient was educated regarding prescribed medication change, including discussion of the indication, administration, side effects, and when to report to MD or RN. Patient demonstrated understanding of this information and agreed to call with further questions or concerns.    Patient stated Amelia Michel understood all health information given and agreed to call with further questions or concerns.

## 2023-08-08 NOTE — NURSING NOTE
Chief Complaint   Patient presents with    Follow Up     62 year old female with history of VSD s/p repair 1969, RA, HTN, insomnia, atrial tachyarrhythmias, cardiac arrest, and DLBCL presenting for follow up. s/p PVC ablation 12/1/22       Vitals were taken, medications reconciled.    Paige Thurner, Facilitator   11:05 AM

## 2023-08-08 NOTE — PATIENT INSTRUCTIONS
You were seen today in the Adult Congenital and Cardiovascular Genetics Clinic at the [facility].    Cardiology Providers you saw during your visit:  Juan Eaton MD    Diagnosis:  VSD    Results:  Juan Eaton MD reviewed the results of your device, zio and echo testing today in clinic.    Recommendations for you:    INCREASE diltiazem to 240 mg daily      General Cardiac Recommendations:  Continue to eat a heart healthy, low salt diet.  Continue to get 20-30 minutes of aerobic activity, 4-5 days per week.  Examples of aerobic activity include walking, running, swimming, cycling, etc.  Continue to observe good oral hygiene, with regular dental visits.      SBE prophylaxis:   Yes____  No____    If YES is checked, follow the recommendations outlined below:  Take antibiotic(s) prior to recommended dental procedures and procedures on the respiratory tract or with infected skin, muscle or bones. SBE prophylaxis is not needed for routine GI and  procedures (ie. Colonoscopy or vaginal delivery)  Observe good oral hygiene daily, as advised by your dentist. Get regular professional dental care.  Keep cuts clean.  Infections should be treated promptly.  Symptoms of Infective Endocarditis could include: fever lasting more than 4-5 days or a recurrent fever that initially resolves but returns within 1-2 days)      Exercise restrictions:   Yes__X__  No____         If yes, list restrictions:  Must be allowed to rest if fatigued or SOB      FASTING CHOLESTEROL was checked in the last 5 years YES__X_  NO___ (2022)      Follow-up:  Follow up with Maria Esther Cutler NP in 6 months with a 1 week zio prior    If you have questions or concerns please contact us at:    Cammy Soares, RN, BSN   Lesli Glaser (Scheduling)  Nurse Care Coordinator     Clinic   Adult Congenital and CV Genetics   Adult Congenital and CV Genetic  [facility]   [facility]  (P)  [phone]     (P) [phone]            (F) [phone]        For after hours urgent needs, call [phone] and ask to speak to the Adult Congenital Physician on call.  Mention Job Code 0401.    For emergencies call 911.    [facility]  [facility]  Mail Code [id number]  [address] [place]  [address]

## 2023-08-08 NOTE — LETTER
8/8/2023      RE: Amelia Michel  [address]  [facility] [address]       Dear Colleague,    Thank you for the opportunity to participate in the care of your patient, Amelia Michel, at the [facility] [place] at [facility]. Please see a copy of my visit note below.    Electrophysiology Clinic Note  HPI:   Ms. Michel is a 62 year old female who has a past medical history significant for VSD s/p repair 1969, RA, HTN, insomnia, atrial tachyarrhythmias, cardiac arrest May 2017, SSS s/p PPM 12/2019,  DLBCL, VT s/p VT ablation 12/1/2017and exudative pericardial effusion, and severe TR s/p TVT 4/10/23.      She was admitted from 5/15/17-5/23/17 with atrial tachyarrhythmias (SVT, AF, AFL) with symptoms of palpitations, fatigue, and nausea. An echo showed LVEF 40-45%, mildly reduced RVEF, moderate biatrial enlargement, mild mitral regurgitation, and moderate tricuspid regurgitation. . She was started on Eliquis and underwent a BRE/DCCV on 5/17/17 c/b hypotension and recurrent arrhythmia.She was started on Sotalol and chemically cardioverted. However, she had nausea and thought it was from the Sotalol so this was stopped. She reverted back to AF/AFL and a rate control strategy was adopted which was difficult given symptoms so she started amiodarone. CMRI  showed LVEF 55%, mildly dilated RV with focal area of dyskinesis in RVOT, severe bi-atrial enlargement, severe TR, mild/moderate MR, and DE at RV septal insertion site. RFA was discussed but was defered as patient had a UTI at the time with ESBL.     She was readmitted on 6/19/17-8/4/17 after suffering an out of hospital cardiac arrest.  Upon EMS arrival, she was in VF. She was externally defibrillated and had ROSC in the field. She was intubated and brought to [facility] found to be in escape rhythm 43 bpm. She was taken emergently to cath lab where coronary angiogram showed normal coronary arteries.  "Temporary pacemaker was placed in RA given sinus arrest. She was also placed on therapeutic hypothermia. She had been having nausea, diarrhea, and intermittent vomiting over the last week and generally had not been feeling well over the last month and also had saddle anesthesia with lower extremity numbness that had been evaluated by neuro as an outpatient.  Ultimately found to have DLBCL started on chemo cycles. 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     Her DLBCL was treated with R-EPOCH for one cycle while in the hospital complicated by cytopenias followed by 5 cycles of R-CHOP finished the cycles in December of 2017 currently on surveillance scans. She had a pericardial effusion for which she is on colchicine which was discontinued at the end of 6/2018.      She has then followed intermittently with EP in clinic. She had some recurrent AF in 4/2018 requiring ED visit with DCCV with recurrence and another DCCV. We restarted digoxin 4/4/18 after which she had one episode of AF s/p DCCV and has since maintained sinus rhythm and reported good symptomatic control. She was seen in 5/2018 and reported having a feeling of her heart \"rolling\" daily lasting about about 10 sec at the most. The last time she required DCCV was in 4/12/2018. Her cMRI had shown some organization of her [ericardial effusion) and she has been initiated on colchicine. Chest CT in 9/2018 showed no evidence of lymphoma recurrence and improved pericardial effusion. Last EP follow up was in 2/2019 and she was doing well without issues.    She then presented 12/12/2019 with 1 day history palpitations, headaches, and nausea. She also endorses some dizziness when walking but not at rest. She was found to be bradycardic with intermittent junctional rhythm into 30's. Electrolytes and renal function stable. Her atenolol and digoxin were held. " She continued to have junctional/bradycardia and decision was made to pursue PPM.    EP Visit 4/10/20: She presents today for follow up. She reports feeling well. She states she does have intermittent episodes of palpitations, that have not been overly bothersome to her. Device site well healed. She denies any chest pain/pressures, dizziness, lightheadedness, worsening shortness of breath, leg/ankle swelling, PND, orthopnea, or syncopal symptoms. Device site is reported well healed. Device transmission shows presenting rhythm is AP/VS @ 60 bpm. 1 NSVT, 6 Fast A&V and 272 AF episodes recorded. All EGMs show AF/AT with RVR. AF burden= 75.3%. She is taking eliquis. Normal device function. AP= 26% and = 13%. No short V-V intervals recorded. Lead trends appear stable. Battery estimates 14.2 years to OZZY. Current cardiac medications include: Elqiuis, Atenolol, Digoxin, Lasix, and Spironolactone.     EP Visit 10/9/20: She presents today for follow up. She reports feeling well. She denies any chest pain/pressures, dizziness, lightheadedness, worsening shortness of breath, leg/ankle swelling, PND, orthopnea, palpitations, or syncopal symptoms. Device interrogation shows normal pacemaker function. 2 Fast A&V and 1 NSVT episodes recorded since last remote transmission. Intrinsic rhythm = Atrial Flutter, w/ VS @ 88 bpm. AF burden = 85.9%. Pt is on Eliquis. AP = 16.7%.  = 23.6%.  Estimated battery longevity to OZZY = 13.6 years. No short v-v intervals recorded. Lead trends appear stable. Presenting 12 lead ECG shows AF/AFL and  Vent Rate 69 bpm,  ms, QTc 473 ms. Echo shows LVEF 60-65%, mildly dilated RV with normal function, moderate TR, unchanged from prior echo. Current cardiac medications include: Elqiuis, Atenolol, Digoxin, Lasix, and Spironolactone.     EP visit 8/25/21: She presents today for follow up. She had some more RVR and her digoxin was resumed. She has been having ongoing fatigue which is now improved  "with better rate control. She denies any chest pain/pressures, dizziness, lightheadedness, worsening shortness of breath, leg/ankle swelling, PND, orthopnea, palpitations, or syncopal symptoms. Device interrogations shows normal device function, stable lead parameters, intrinsic is AFL/VS/ at 60 bpm, HR histograms adequate, 100% AF/AFL since 11/2020, AP <0.1%,  49.3%. Echo today shows normal LVEF 60-65%, mildly decreased RV function, moderate TI, and dilated IVC. Current cardiac medications include: Elqiuis, Atenolol, Digoxin, Lasix, and Spironolactone. SHe was asymptomatic with her AFL with preserved EF, hence we continued conservative management.    EP visit 8/22/22: She presents today for follow-up. Patient feels she is \"in failure\" because she feels bloated and liver under tension. TTE done today shows a normal EF, dilated IVC with mod to sev TR and some pulmonary HTN, severe biatrial enlargement. Creatinine 1.09 7/20/22, weight has not increased much but she feels like it did  Otherwise no bleeding issues with Eliquis.   Her device interrogation today shows that she is V pacing 77% of the time, had runs been as nonsustained VT but available EGM suggest atrial fibrillation with RVR, battery life 11 years.  Her EKG today shows a baseline of atrial fibrillation with ventricular pacing at 68 bpm.AT that visit her lasix was adjusted temporarly.    EP Visit 2/1/23: She presents today for follow-up after she was admitted to the hospital in December 2023 for heart failure exacerbation and RVR from her chronic AF. Her diuretics were optimized She is feeling better since her discharge.  She had runs of VT in the hospital and underwent a VT ablation on 12/1/22. She is not feeling any of the symptoms she was having during the VT runs in the hospital. She has had no recurrences of VT. While in the hospital, she met with Dr Walker in the hospital who wants to keep her HR more permissive. Today her HR is at 92 bpm at " rest.NSVT runs on device are more likely AF with RVR. No bleeding issues with Eliquis.    She presents today for follow up. She underwent TVR on 4/10/23. She is recovering well post operatively. She reports feeling well overall. She is asymptomatic with her SVT and NSVT. She denies chest discomfort, palpitations, abdominal fullness/bloating or peripheral edema, shortness of breath, paroxysmal nocturnal dyspnea, orthopnea, lightheadedness, dizziness, pre-syncope, or syncope. A zio patch monitor from 6/30/23-7/7/23 showed 6 NSVT up to 7.3 seconds and 1575 PSVT up to 2 minutes and 3 seconds with no symptoms reported. Device interrogation shows normal device function, stable lead parameters, 33.7% AF burden, 412 NSVT some of the episodes have RR irregularity noted, and AP 49.4%,  60.7%. Current cardiac medications include: Diltiazem, Spironolactone, ASA, and Eliquis.         PAST MEDICAL HISTORY:  Past Medical History:   Diagnosis Date     Arthritis      Cardiac arrest (H)      History of blood clots 2017    PICC line Right arm      History of chemotherapy      History of transfusion      Hypertension      Neuropathy      Physical deconditioning      PONV (postoperative nausea and vomiting)      Positive PPD, 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     VSD (ventricular septal defect)        CURRENT MEDICATIONS:  Current Outpatient Medications   Medication Sig Dispense Refill     acetaminophen (TYLENOL) 325 MG tablet Take 2 tablets (650 mg) by mouth every 4 hours as needed for other (For optimal non-opioid multimodal pain management to improve pain control.) 100 tablet 1     alendronate (FOSAMAX) 70 MG tablet Take 1 tablet (70 mg) by mouth every 7 days (Patient taking differently: Take 70 mg by mouth every 7 days Mondays) 12 tablet 3     apixaban ANTICOAGULANT (ELIQUIS ANTICOAGULANT) 5 MG tablet Take 1 tablet (5 mg) by mouth 2 times daily 180 tablet 3     aspirin (ASA) 81 MG chewable tablet 1 tablet (81 mg) by Oral or NG Tube route daily 30  tablet 2     calcium carbonate 600 mg-vitamin D 400 units (CALTRATE) 600-400 MG-UNIT per tablet Take 2 tablets by mouth daily       dexamethasone (DECADRON) 4 MG/ML injection Inject 4 mg IM for adrenal crisis 2 mL 3     diltiazem ER (DILT-XR) 120 MG 24 hr capsule Take 1 capsule (120 mg) by mouth daily 90 capsule 1     diltiazem ER (DILT-XR) 180 MG 24 hr capsule Take 1 capsule (180 mg) by mouth daily 90 capsule 3     diltiazem ER COATED BEADS (CARDIZEM CD/CARTIA XT) 120 MG 24 hr capsule Take 1 capsule (120 mg) by mouth daily 30 capsule 2     docusate sodium (COLACE) 100 MG capsule Take 100 mg by mouth 2 times daily as needed for constipation       empagliflozin (JARDIANCE) 10 MG TABS tablet Take 1 tablet (10 mg) by mouth daily for 360 days 90 tablet 3     gabapentin (NEURONTIN) 100 MG capsule TAKE 1 CAPSULE BY MOUTH IN THE MORNING, 1 CAPSULE BY MOUTH IN THE AFTERNOON, AND 2 CAPSULES BY MOUTH AT BEDTIME 360 capsule 0     hydroxychloroquine (PLAQUENIL) 200 MG tablet Take 1 tablet (200 mg) by mouth daily 90 tablet 2     loratadine (CLARITIN) 10 MG tablet Take 10 mg by mouth daily       magnesium oxide 200 MG TABS Take 200 mg by mouth daily bedtime       methocarbamol (ROBAXIN) 500 MG tablet Take 1 tablet (500 mg) by mouth 3 times daily 90 tablet 0     metolazone (ZAROXOLYN) 2.5 MG tablet Take 1 tablet (2.5 mg) by mouth as needed (ONLY take if instructed to do so by your cardiology team.) 5 tablet 0     multivitamin, therapeutic with minerals (THERA-VIT-M) TABS tablet Take 1 tablet by mouth daily 30 each 0     potassium chloride ER (KLOR-CON M) 20 MEQ CR tablet Take 3 tablets (60 mEq) by mouth daily 270 tablet 3     predniSONE (DELTASONE) 1 MG tablet Take 3 tablets (3 mg) by mouth daily - Oral 270 tablet 1     predniSONE (DELTASONE) 5 MG tablet 10 mg when sick for 3 days or until back to baseline for secondary adrenal insufficiency 30 tablet 3     spironolactone (ALDACTONE) 25 MG tablet Take 0.5 tablets (12.5 mg) by  mouth daily 30 tablet 3     spironolactone (ALDACTONE) 25 MG tablet Take 0.5 tablets (12.5 mg) by mouth daily 45 tablet 1     Vitamin D (Cholecalciferol) 10 MCG (400 UNIT) TABS Take 1 tablet by mouth daily         PAST SURGICAL HISTORY:  Past Surgical History:   Procedure Laterality Date     ANESTHESIA CARDIOVERSION N/A 5/17/2017    Procedure: ANESTHESIA CARDIOVERSION;  ANESTHESIA CARDIOVERSION;  Surgeon: GENERIC ANESTHESIA PROVIDER;  Location: [facility] OR     ANESTHESIA CARDIOVERSION  3/12/2018    Procedure: ANESTHESIA CARDIOVERSION;;  Surgeon: GENERIC ANESTHESIA PROVIDER;  Location: [facility] OR     ANESTHESIA CARDIOVERSION N/A 3/23/2018    Procedure: ANESTHESIA CARDIOVERSION;  Anesthesia Cardioversion ;  Surgeon: GENERIC ANESTHESIA PROVIDER;  Location: [facility] OR     ANESTHESIA CARDIOVERSION N/A 4/12/2018    Procedure: ANESTHESIA CARDIOVERSION;  Cardioversion;  Surgeon: GENERIC ANESTHESIA PROVIDER;  Location: [facility] OR     ARTHRODESIS WRIST  2000    Right wrist     BONE MARROW ASPIRATE &BIOPSY  7/12/2017          BONE MARROW BIOPSY W/ ASPIRATION  07/12/2017     CARDIOVERSION      5/17/17, 3/12/18, 3/23/18, 4/14/18,      COLONOSCOPY N/A 11/19/2019    Procedure: Colonoscopy, With Polypectomy And Biopsy;  Surgeon: Pj Vasques MD;  Location: [facility]     CV CORONARY ANGIOGRAM N/A 11/28/2022    Procedure: Coronary Angiogram;  Surgeon: Sundar Wood MD;  Location: [facility] CARDIAC CATH LAB     CV RIGHT HEART CATH MEASUREMENTS RECORDED N/A 11/28/2022    Procedure: Right Heart Catheterization;  Surgeon: Sundar Wood MD;  Location:  HEART CARDIAC CATH LAB     EP ABLATION PVC N/A 12/1/2022    Procedure: Ablation Premature Ventricular Contractions;  Surgeon: Juan Eaton MD;  Location:  HEART CARDIAC CATH LAB     EP PACEMAKER N/A 12/13/2019    Procedure: EP Pacemaker;  Surgeon: Pj Trent MD;  Location:  HEART CARDIAC CATH LAB     EXCISE LESION UPPER EXTREMITY Left 5/22/2018    Procedure: EXCISE LESION UPPER  EXTREMITY;  Left Arm Wound Excision And Closure, Possible Submuscular Transposition of Ulnar Nerve  (Choice Anesthesia);  Surgeon: Kevin Sheldon MD;  Location: [facility] OR     FOOT SURGERY      4 left and 2 right     FOOT SURGERY      4 Left and 2 Right      IMPLANT PACEMAKER  12/13/2019    Dr China Trent  [facility] Cardiac Cath lab      IMPLANT PACEMAKER       RELEASE CARPAL TUNNEL Bilateral      RELEASE CARPAL TUNNEL BILATERAL       REPAIR VALVE TRICUSPID MINIMALLY INVASIVE N/A 4/10/2023    Procedure: MINIMALLY INVASIVE TRICUSPID VALVE REPLACEMENT USING 29MM ST. NILAM EPIC VALVE, FEMORAL CANNULATION AND RIGHT GROIN CUTDOWN, CARDIOPULMONARY BYPASS, TRANSESOPHAGEAL ECHOCARDIOGRAM PERFORMED BY ANESTHESIA STAFF;  Surgeon: Chilango Cope MD;  Location: [facility] OR     REPAIR VENTRICULAR SEPTAL DEFECT  1969     TRANSPOSITION ULNAR NERVE (ELBOW) Left 5/22/2018    Procedure: TRANSPOSITION ULNAR NERVE (ELBOW);;  Surgeon: Kevin Sheldon MD;  Location: [facility] OR     ULNAR NERVE TRANSPOSITION Left 05/22/2018     VSD REPAIR  1969     ZZC FUSION FOOT BONES,SUBTALAR Right 10/20/2020    Procedure: RIGHT SUBTALAR JOINT FUSION AND CALCANEOCUBOID FUSION;  Surgeon: Nemesio Crow MD;  Location: [facility];  Service: Orthopedics       ALLERGIES:     Allergies   Allergen Reactions     Amiodarone Other (See Comments) and Difficulty breathing     Lethargic and had trouble breathing- occurred in 2017     Blood Transfusion Related (Informational Only) Hives     Hives with platelets. Give benadryl premedication.     Metoprolol Other (See Comments)     Pt and  report that metoprolol does not work for her and also reports feeling unwell with this medication. She has been able to tolerate atenolol, which as worked in controlling her HR.      Other [No Clinical Screening - See Comments]      Penicillin Allergy Skin Test not performed, see antimicrobial management team progress note 7/5/17.       Penicillins      Childhood reaction,  "concern for tongue swelling     Tape [Adhesive Tape] Rash       FAMILY HISTORY:  Family History   Problem Relation Age of Onset     Breast Cancer Mother         60s     Hypertension Mother      Alzheimer Disease Mother      Cerebrovascular Disease Mother      Hypertension Father      Cerebrovascular Disease Father      Atrial fibrillation Father      Lymphoma Father      Prostate Cancer Father      Alzheimer Disease Maternal Grandmother         likely     Arthritis Maternal Grandfather      Pneumonia Maternal Grandfather      Diabetes Paternal Grandmother        SOCIAL HISTORY:  Social History     Tobacco Use     Smoking status: Never     Smokeless tobacco: Never   Vaping Use     Vaping Use: Never used   Substance Use Topics     Alcohol use: Not Currently     Comment: none     Drug use: No         Exam:  /81 (BP Location: Right arm, Patient Position: Chair, Cuff Size: Adult Regular)   Pulse 86   Ht 1.48 m (4' 10.27\")   Wt 47.9 kg (105 lb 9.6 oz)   SpO2 98%   BMI 21.87 kg/m    GENERAL APPEARANCE: alert and no distress  HEENT: no icterus, no xanthelasmas, normal pupil size and reaction, normal palate, mucosa moist, no central cyanosis  NECK: Supple.  RESPIRATORY: lungs clear to auscultation - no rales, rhonchi or wheezes, no use of accessory muscles, no retractions, respirations are unlabored, normal respiratory rate  CARDIOVASCULAR:Regular, no murmur  ABDOMEN: soft, non tender, bowel sounds normal, non-distended  EXTREMITIES: peripheral pulses normal, no edema  NEURO: alert and oriented to person/place/time, normal speech, gait and affect  SKIN: no ecchymoses, no rashes  PSYCH: normal affect, cooperative      Testing/Procedures:  9/11/17 ECHOCARDIOGRAM:   Interpretation Summary  Left ventricular function, chamber size, wall motion, and wall thickness are  normal.The EF is 60-65% (traced 60%.) GLS -18%.  The right ventricle is normal size and normal in function. The RV is mildly dilated.  Moderate tricuspid " insufficiency is present.  Mild pulmonary hypertension is present (esimated PASP 55 mmHg including RA pressure.)  Dilation of the inferior vena cava is present with abnormal respiratory  variation in diameter (esitimated RAP 15 mmHg.)  This study was compared with the study from 8/31/17: TR appears slightly decreased from the earlier study.     7/24/17 CMRI:     1. The LV is normal in cavity size and wall thickness. The global systolic function is normal. The LVEF is  64%. There are no regional wall motion abnormalities. No evidence of myocardial iron overload.   2. The RV is normal in cavity size. The global systolic function is normal. The RVEF is 59%.   3. There is moderate dilation of bilateral atria.   4. Tricuspid regurgitation is present, difficult to quantify due to image quality.   5. Late gadolinium enhancement imaging shows RV insertion site hyperenhancement, a non-specific finding  seen in patients with RV volume/pressure overload.   6. There is a moderate right pleural effusion and small left pleural effusion.    CONCLUSIONS: Normal Bi-Ventricular systolic function, LVEF 64% and RVE 59%. There is no evidence of  infiltrative disease. Compared to the MRI from 5/15/2017, there is no significant change.      7/2017 BRE:  Interpretation Summary  There is a small mass (0.7 cm by 0.2 cm) on the ventricular side of the right  coronary cusp. There is a second mass (0.4 cm by 0.2 cm) observed in the LVOT,  attached to the mitro-aortic curtain. There is a third mass on the noncoronary  cusp that measures 0.4 cm by 0.2 cm that could be a healing vegetation. These  findings are compatible with endocarditis if clinical picture supports.  Right ventricular function, chamber size, wall motion, and thickness are  normal.  This study was compared with the study from 7/10/2017.  There is detection of masses on the aortic valve and LVOT.     4/12/18 CMRI:  1. The LV is normal in cavity size and wall thickness. The global  systolic function is normal. The LVEF is  54%. There are no regional wall motion abnormalities.  2. The RV is mildly dilated in cavity size. The global systolic function is normal. The RVEF is 49%.   3. Both atria are severely enlarged.  4. There is at least moderate, possibly severe tricuspid regurgitation.   5. Late gadolinium enhancement imaging shows hyperenhancement in the RV insertion site consistent with RV  pressure/volume overload.   6. There is a loculated pericardial effusion along the basal lateral and basal to mid inferior LV walls,  and along the inferior RV wall. it appears exudative in nature, and is beginning to organize. There is  diffuse pericardial enhancement.  7. There is no intracardiac thrombus or mass.  8. There is a small right pleural effusion.    9/26/18 CHEST CT:                                                                   IMPRESSION: In this patient with a history of lymphoma:  1. No evidence of disease recurrence, consistent with complete  response per Lugano criteria.  2. Stable cardiomegaly. Improved pericardial effusion.  3. Early contrast phase with heterogeneous visceral enhancement in the  abdomen, likely secondary to cardiac dysfunction.    10/9/20 ECHOCARDIOGRAM:  Interpretation Summary  VSD s/p repair in 1969  Global and regional left ventricular function is normal with an EF of 60-65%.  No flow acceleration is seen across the septum.  Global right ventricular function is normal. Mild right ventricular dilation  is present.  There is moderate tricuspid regurgitation.  The estimated PA systolic pressure is 32 mmHg but this is likely to be  underestimated due to the presence of multiple jets.  This study was compared with the study from 09/11/2017. There has been no  change in the severity of the tricuspid regurgitation or the RV size/function.  8/2021 ECHOCARDIOGRAM:  terpretation Summary  H/O of VSD repair in 1969  The visual ejection fraction is 60-65%. No wall motion  abnormalities. Global  right ventricular function is mildly reduced.  Moderate tricuspid insufficiency is present.  There is a right ventricular right atrial pressure difference of 24mmHg.  IVC diameter >2.1 cm collapsing <50% with sniff suggests a high RA pressure  estimated at 15 mmHg or greater.  No pericardial effusion is present.    TTE 8/26/22:  Interpretation Summary  Left ventricular function, chamber size, wall motion, and thickness are normal.  Severe biatrial enlargement is present.  Moderate to severe tricuspid insufficiency is present.  Dilation of the inferior vena cava is present with abnormal respiratory  variation in diameter.  No pericardial effusion is present.    Assessment and Plan:   Ms. Michel is a 62 year old female who has a past medical history significant for VSD s/p repair 1969, RA, HTN, insomnia, atrial tachyarrhythmias, cardiac arrest May 2017, SSS s/p PPM 12/2019,  DLBCL, VT s/p VT ablation 12/1/2017and exudative pericardial effusion, and severe TR s/p TVT 4/10/23.     RV VT, focal:  S/p Vt ablation on 12/1/2022. No recurrences of sustained VT.  Does have short runs of NSVT.    Sick Sinus Syndrome:   Atrial Fibrillation:  1. Stroke Prophylaxis:  CHADSVASC=+HTN, +gender  2, corresponding to a 2.2% annual stroke / systemic emolism event rate. indicating need for long term oral anticoagulation.  She is on Eliquis. No bleeding issues.   2. Rate Control: Continue diltiazem, increased to 240 CD daily.  3. Rhythm Control: Cardioversion, Antiarrhythmics and/or ablation are options for rhythm control. She has had several DCCV last in 4/12/19. Notably, she had possible side effects from Sotalol (however, likely was due to underlying illness at the time and not from medication since she was found to have DBCL).  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  However post TVR has been having SR with 33% AF burden on device.  No intervention needed at this time per  patient preference and asymptomatic.  4. Had tachy-shelton syndrome and underwent PPM implant in 12/2019. Normal device function with stable lead parameters. She will have continued follow up with Device clinic per routine.     Follow-up in 6 months with EP KEVIN with zio for one week    The patient states understanding and is agreeable with plan.   Juan Eaton MD [facility]  Cardiology - Electrophysiology    Total time spent on patient visit, reviewing notes, imaging, labs, orders, and completing necessary documentation:45 minutes.  >50% of visit spent on counseling patient and/or coordination of care.                Please do not hesitate to contact me if you have any questions/concerns.     Sincerely,     Juan Eaton MD

## 2023-08-10 ENCOUNTER — HOSPITAL ENCOUNTER (OUTPATIENT)
Dept: CARDIAC REHAB | Facility: CLINIC | Age: 63
Discharge: HOME | End: 2023-08-10
Attending: INTERNAL MEDICINE
Payer: COMMERCIAL

## 2023-08-10 PROCEDURE — 93798 PHYS/QHP OP CAR RHAB W/ECG: CPT

## 2023-08-12 LAB
MDC_IDC_EPISODE_DTM: NORMAL
MDC_IDC_EPISODE_DURATION: 0 S
MDC_IDC_EPISODE_DURATION: 1 S
MDC_IDC_EPISODE_DURATION: 101 S
MDC_IDC_EPISODE_DURATION: 11 S
MDC_IDC_EPISODE_DURATION: 120 S
MDC_IDC_EPISODE_DURATION: 13 S
MDC_IDC_EPISODE_DURATION: 14 S
MDC_IDC_EPISODE_DURATION: 142 S
MDC_IDC_EPISODE_DURATION: 157 S
MDC_IDC_EPISODE_DURATION: 168 S
MDC_IDC_EPISODE_DURATION: 17 S
MDC_IDC_EPISODE_DURATION: 17 S
MDC_IDC_EPISODE_DURATION: 176 S
MDC_IDC_EPISODE_DURATION: 180 S
MDC_IDC_EPISODE_DURATION: 181 S
MDC_IDC_EPISODE_DURATION: 181 S
MDC_IDC_EPISODE_DURATION: 182 S
MDC_IDC_EPISODE_DURATION: 183 S
MDC_IDC_EPISODE_DURATION: 183 S
MDC_IDC_EPISODE_DURATION: 192 S
MDC_IDC_EPISODE_DURATION: 2 S
MDC_IDC_EPISODE_DURATION: 2 S
MDC_IDC_EPISODE_DURATION: 20 S
MDC_IDC_EPISODE_DURATION: 200 S
MDC_IDC_EPISODE_DURATION: 208 S
MDC_IDC_EPISODE_DURATION: 215 S
MDC_IDC_EPISODE_DURATION: 23 S
MDC_IDC_EPISODE_DURATION: 241 S
MDC_IDC_EPISODE_DURATION: 250 S
MDC_IDC_EPISODE_DURATION: 267 S
MDC_IDC_EPISODE_DURATION: 27 S
MDC_IDC_EPISODE_DURATION: 27 S
MDC_IDC_EPISODE_DURATION: 275 S
MDC_IDC_EPISODE_DURATION: 29 S
MDC_IDC_EPISODE_DURATION: 3 S
MDC_IDC_EPISODE_DURATION: 3 S
MDC_IDC_EPISODE_DURATION: 30 S
MDC_IDC_EPISODE_DURATION: 308 S
MDC_IDC_EPISODE_DURATION: 31 S
MDC_IDC_EPISODE_DURATION: 31 S
MDC_IDC_EPISODE_DURATION: 313 S
MDC_IDC_EPISODE_DURATION: 32 S
MDC_IDC_EPISODE_DURATION: 34 S
MDC_IDC_EPISODE_DURATION: 36 S
MDC_IDC_EPISODE_DURATION: 36 S
MDC_IDC_EPISODE_DURATION: 37 S
MDC_IDC_EPISODE_DURATION: 375 S
MDC_IDC_EPISODE_DURATION: 380 S
MDC_IDC_EPISODE_DURATION: 39 S
MDC_IDC_EPISODE_DURATION: 396 S
MDC_IDC_EPISODE_DURATION: 4 S
MDC_IDC_EPISODE_DURATION: 40 S
MDC_IDC_EPISODE_DURATION: 40 S
MDC_IDC_EPISODE_DURATION: 41 S
MDC_IDC_EPISODE_DURATION: 415 S
MDC_IDC_EPISODE_DURATION: 42 S
MDC_IDC_EPISODE_DURATION: 433 S
MDC_IDC_EPISODE_DURATION: 44 S
MDC_IDC_EPISODE_DURATION: 45 S
MDC_IDC_EPISODE_DURATION: 451 S
MDC_IDC_EPISODE_DURATION: 46 S
MDC_IDC_EPISODE_DURATION: 47 S
MDC_IDC_EPISODE_DURATION: 49 S
MDC_IDC_EPISODE_DURATION: 507 S
MDC_IDC_EPISODE_DURATION: 51 S
MDC_IDC_EPISODE_DURATION: 51 S
MDC_IDC_EPISODE_DURATION: 53 S
MDC_IDC_EPISODE_DURATION: 54 S
MDC_IDC_EPISODE_DURATION: 55 S
MDC_IDC_EPISODE_DURATION: 56 S
MDC_IDC_EPISODE_DURATION: 56 S
MDC_IDC_EPISODE_DURATION: 57 S
MDC_IDC_EPISODE_DURATION: 61 S
MDC_IDC_EPISODE_DURATION: 63 S
MDC_IDC_EPISODE_DURATION: 64 S
MDC_IDC_EPISODE_DURATION: 68 S
MDC_IDC_EPISODE_DURATION: 68 S
MDC_IDC_EPISODE_DURATION: 69 S
MDC_IDC_EPISODE_DURATION: 71 S
MDC_IDC_EPISODE_DURATION: 72 S
MDC_IDC_EPISODE_DURATION: 73 S
MDC_IDC_EPISODE_DURATION: 732 S
MDC_IDC_EPISODE_DURATION: 81 S
MDC_IDC_EPISODE_DURATION: 85 S
MDC_IDC_EPISODE_DURATION: 86 S
MDC_IDC_EPISODE_DURATION: 88 S
MDC_IDC_EPISODE_DURATION: 89 S
MDC_IDC_EPISODE_DURATION: 9 S
MDC_IDC_EPISODE_DURATION: 9 S
MDC_IDC_EPISODE_DURATION: 92 S
MDC_IDC_EPISODE_ID: NORMAL
MDC_IDC_EPISODE_TYPE: NORMAL
MDC_IDC_LEAD_IMPLANT_DT: NORMAL
MDC_IDC_LEAD_IMPLANT_DT: NORMAL
MDC_IDC_LEAD_LOCATION: NORMAL
MDC_IDC_LEAD_LOCATION: NORMAL
MDC_IDC_LEAD_LOCATION_DETAIL_1: NORMAL
MDC_IDC_LEAD_LOCATION_DETAIL_1: NORMAL
MDC_IDC_LEAD_MFG: NORMAL
MDC_IDC_LEAD_MFG: NORMAL
MDC_IDC_LEAD_MODEL: NORMAL
MDC_IDC_LEAD_MODEL: NORMAL
MDC_IDC_LEAD_POLARITY_TYPE: NORMAL
MDC_IDC_LEAD_POLARITY_TYPE: NORMAL
MDC_IDC_LEAD_SERIAL: NORMAL
MDC_IDC_LEAD_SERIAL: NORMAL
MDC_IDC_LEAD_SPECIAL_FUNCTION: NORMAL
MDC_IDC_LEAD_SPECIAL_FUNCTION: NORMAL
MDC_IDC_MSMT_BATTERY_DTM: NORMAL
MDC_IDC_MSMT_BATTERY_REMAINING_LONGEVITY: 127 MO
MDC_IDC_MSMT_BATTERY_RRT_TRIGGER: 2.62
MDC_IDC_MSMT_BATTERY_STATUS: NORMAL
MDC_IDC_MSMT_BATTERY_VOLTAGE: 3 V
MDC_IDC_MSMT_LEADCHNL_RA_IMPEDANCE_VALUE: 323 OHM
MDC_IDC_MSMT_LEADCHNL_RA_IMPEDANCE_VALUE: 475 OHM
MDC_IDC_MSMT_LEADCHNL_RA_PACING_THRESHOLD_AMPLITUDE: 0.5 V
MDC_IDC_MSMT_LEADCHNL_RA_PACING_THRESHOLD_PULSEWIDTH: 0.4 MS
MDC_IDC_MSMT_LEADCHNL_RA_SENSING_INTR_AMPL: 2.5 MV
MDC_IDC_MSMT_LEADCHNL_RV_IMPEDANCE_VALUE: 513 OHM
MDC_IDC_MSMT_LEADCHNL_RV_IMPEDANCE_VALUE: 608 OHM
MDC_IDC_MSMT_LEADCHNL_RV_PACING_THRESHOLD_AMPLITUDE: 0.75 V
MDC_IDC_MSMT_LEADCHNL_RV_PACING_THRESHOLD_PULSEWIDTH: 0.4 MS
MDC_IDC_MSMT_LEADCHNL_RV_SENSING_INTR_AMPL: 14.12 MV
MDC_IDC_PG_IMPLANT_DTM: NORMAL
MDC_IDC_PG_MFG: NORMAL
MDC_IDC_PG_MODEL: NORMAL
MDC_IDC_PG_SERIAL: NORMAL
MDC_IDC_PG_TYPE: NORMAL
MDC_IDC_SESS_CLINIC_NAME: NORMAL
MDC_IDC_SESS_DTM: NORMAL
MDC_IDC_SESS_TYPE: NORMAL
MDC_IDC_SET_BRADY_AT_MODE_SWITCH_RATE: 171 {BEATS}/MIN
MDC_IDC_SET_BRADY_HYSTRATE: NORMAL
MDC_IDC_SET_BRADY_LOWRATE: 75 {BEATS}/MIN
MDC_IDC_SET_BRADY_MAX_SENSOR_RATE: 130 {BEATS}/MIN
MDC_IDC_SET_BRADY_MAX_TRACKING_RATE: 130 {BEATS}/MIN
MDC_IDC_SET_BRADY_MODE: NORMAL
MDC_IDC_SET_BRADY_PAV_DELAY_LOW: 180 MS
MDC_IDC_SET_BRADY_SAV_DELAY_LOW: 150 MS
MDC_IDC_SET_LEADCHNL_RA_PACING_AMPLITUDE: 1.5 V
MDC_IDC_SET_LEADCHNL_RA_PACING_ANODE_ELECTRODE_1: NORMAL
MDC_IDC_SET_LEADCHNL_RA_PACING_ANODE_LOCATION_1: NORMAL
MDC_IDC_SET_LEADCHNL_RA_PACING_CAPTURE_MODE: NORMAL
MDC_IDC_SET_LEADCHNL_RA_PACING_CATHODE_ELECTRODE_1: NORMAL
MDC_IDC_SET_LEADCHNL_RA_PACING_CATHODE_LOCATION_1: NORMAL
MDC_IDC_SET_LEADCHNL_RA_PACING_POLARITY: NORMAL
MDC_IDC_SET_LEADCHNL_RA_PACING_PULSEWIDTH: 0.4 MS
MDC_IDC_SET_LEADCHNL_RA_SENSING_ANODE_ELECTRODE_1: NORMAL
MDC_IDC_SET_LEADCHNL_RA_SENSING_ANODE_LOCATION_1: NORMAL
MDC_IDC_SET_LEADCHNL_RA_SENSING_CATHODE_ELECTRODE_1: NORMAL
MDC_IDC_SET_LEADCHNL_RA_SENSING_CATHODE_LOCATION_1: NORMAL
MDC_IDC_SET_LEADCHNL_RA_SENSING_POLARITY: NORMAL
MDC_IDC_SET_LEADCHNL_RA_SENSING_SENSITIVITY: 0.3 MV
MDC_IDC_SET_LEADCHNL_RV_PACING_AMPLITUDE: 2 V
MDC_IDC_SET_LEADCHNL_RV_PACING_ANODE_ELECTRODE_1: NORMAL
MDC_IDC_SET_LEADCHNL_RV_PACING_ANODE_LOCATION_1: NORMAL
MDC_IDC_SET_LEADCHNL_RV_PACING_CAPTURE_MODE: NORMAL
MDC_IDC_SET_LEADCHNL_RV_PACING_CATHODE_ELECTRODE_1: NORMAL
MDC_IDC_SET_LEADCHNL_RV_PACING_CATHODE_LOCATION_1: NORMAL
MDC_IDC_SET_LEADCHNL_RV_PACING_POLARITY: NORMAL
MDC_IDC_SET_LEADCHNL_RV_PACING_PULSEWIDTH: 0.4 MS
MDC_IDC_SET_LEADCHNL_RV_SENSING_ANODE_ELECTRODE_1: NORMAL
MDC_IDC_SET_LEADCHNL_RV_SENSING_ANODE_LOCATION_1: NORMAL
MDC_IDC_SET_LEADCHNL_RV_SENSING_CATHODE_ELECTRODE_1: NORMAL
MDC_IDC_SET_LEADCHNL_RV_SENSING_CATHODE_LOCATION_1: NORMAL
MDC_IDC_SET_LEADCHNL_RV_SENSING_POLARITY: NORMAL
MDC_IDC_SET_LEADCHNL_RV_SENSING_SENSITIVITY: 0.9 MV
MDC_IDC_SET_ZONE_DETECTION_INTERVAL: 350 MS
MDC_IDC_SET_ZONE_DETECTION_INTERVAL: 400 MS
MDC_IDC_SET_ZONE_TYPE: NORMAL
MDC_IDC_STAT_AT_BURDEN_PERCENT: 33.7 %
MDC_IDC_STAT_AT_DTM_END: NORMAL
MDC_IDC_STAT_AT_DTM_START: NORMAL
MDC_IDC_STAT_BRADY_AP_VP_PERCENT: 67.36 %
MDC_IDC_STAT_BRADY_AP_VS_PERCENT: 5.02 %
MDC_IDC_STAT_BRADY_AS_VP_PERCENT: 11.31 %
MDC_IDC_STAT_BRADY_AS_VS_PERCENT: 16.44 %
MDC_IDC_STAT_BRADY_DTM_END: NORMAL
MDC_IDC_STAT_BRADY_DTM_START: NORMAL
MDC_IDC_STAT_BRADY_RA_PERCENT_PACED: 49.4 %
MDC_IDC_STAT_BRADY_RV_PERCENT_PACED: 60.66 %
MDC_IDC_STAT_EPISODE_RECENT_COUNT: 1298
MDC_IDC_STAT_EPISODE_RECENT_COUNT: 412
MDC_IDC_STAT_EPISODE_RECENT_COUNT_DTM_END: NORMAL
MDC_IDC_STAT_EPISODE_RECENT_COUNT_DTM_END: NORMAL
MDC_IDC_STAT_EPISODE_RECENT_COUNT_DTM_START: NORMAL
MDC_IDC_STAT_EPISODE_RECENT_COUNT_DTM_START: NORMAL
MDC_IDC_STAT_EPISODE_TOTAL_COUNT: 397
MDC_IDC_STAT_EPISODE_TOTAL_COUNT: 4823
MDC_IDC_STAT_EPISODE_TOTAL_COUNT: NORMAL
MDC_IDC_STAT_EPISODE_TOTAL_COUNT_DTM_END: NORMAL
MDC_IDC_STAT_EPISODE_TOTAL_COUNT_DTM_START: NORMAL
MDC_IDC_STAT_EPISODE_TYPE: NORMAL

## 2023-08-15 ENCOUNTER — HOSPITAL ENCOUNTER (OUTPATIENT)
Dept: CARDIAC REHAB | Facility: CLINIC | Age: 63
Discharge: HOME | End: 2023-08-15
Attending: INTERNAL MEDICINE
Payer: COMMERCIAL

## 2023-08-15 PROCEDURE — 93798 PHYS/QHP OP CAR RHAB W/ECG: CPT

## 2023-08-17 ENCOUNTER — HOSPITAL ENCOUNTER (OUTPATIENT)
Dept: CARDIAC REHAB | Facility: CLINIC | Age: 63
Discharge: HOME | End: 2023-08-17
Attending: INTERNAL MEDICINE
Payer: COMMERCIAL

## 2023-08-17 PROCEDURE — 93798 PHYS/QHP OP CAR RHAB W/ECG: CPT

## 2023-08-21 ENCOUNTER — CARE COORDINATION (OUTPATIENT)
Dept: CARDIOLOGY | Facility: CLINIC | Age: 63
End: 2023-08-21
Payer: COMMERCIAL

## 2023-08-21 NOTE — PROGRESS NOTES
MEMS was submitted and denied. Appeal sent in and also denied. No further action can be taken at this time.

## 2023-08-22 ENCOUNTER — HOSPITAL ENCOUNTER (OUTPATIENT)
Dept: CARDIAC REHAB | Facility: CLINIC | Age: 63
Discharge: HOME | End: 2023-08-22
Attending: INTERNAL MEDICINE
Payer: COMMERCIAL

## 2023-08-22 PROCEDURE — 93798 PHYS/QHP OP CAR RHAB W/ECG: CPT

## 2023-08-24 ENCOUNTER — HOSPITAL ENCOUNTER (OUTPATIENT)
Dept: CARDIAC REHAB | Facility: CLINIC | Age: 63
Discharge: HOME | End: 2023-08-24
Attending: THORACIC SURGERY (CARDIOTHORACIC VASCULAR SURGERY)
Payer: COMMERCIAL

## 2023-08-24 PROCEDURE — 93797 PHYS/QHP OP CAR RHAB WO ECG: CPT | Mod: 59

## 2023-08-24 PROCEDURE — 93798 PHYS/QHP OP CAR RHAB W/ECG: CPT

## 2023-08-28 NOTE — PROGRESS NOTES
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 The following is a comprehensive summary of her Endocrine care to date.      # Osteoporosis     We previously reviewed the osteoporosis dictation template.    DEXA 3/30/2021:  -lumbar spine non diagnostic  -lowest T-score -2.0 at the right femoral neck with an increase in the total mean hip BMD by 6.1% (significant) since 2018     DEXA 8/2/2023:  -lumbar spine non diagnostic  -lowest overall T-score at the hips is -2.3 at the left total hip; BMD at the total mean hip has decreased a significant -4.6% compared to 3/2021     Thoracic/lumbar spine XR 11/2/2021 negative for vertebral compression fracture.    CXR 1/2023: negative for compression fracture     No prior fractures. She has poor balance. No prior nephrolithiasis.     She has rheumatoid arthritis and has been on prednisone daily since her mid 30s, initially 3 mg daily and since 2017 she has been on prednisone 5 mg daily before reducing back to 3 mg daily. She had been on methotrexate previously but it was stopped remotely. Outside of chemotherapy for diffuse large B cell lymphoma in 2017 (she has never had radiation therapy, her DLBCL was treated only with chemotherapy in 2017), no high dose glucocorticoid exposure. No other GC exposure outside of prednisone. She has been on apixaban since 2017.       As of 2/2023 Amelia has completed a complete evaluation for secondary causes of increased fracture risk with the following notable findings:  -Has had several episodes of mild hypercalcemia over the years:      -2017: uncorrected serum calcium 10.2 mg/dL (ULN 10.1) with mildly low albumin, 10/2021: uncorrected serum calcium 10.2 (ULN 10.1 mg/dL) without an albumin, 4/2022: corrected serum calcium (albumin 4.4 g/dL) 10.8 mg/dl (ULN 10.1), 5/24/2022 uncorrected serum calcium 10.8 mg/dL with no albumin; 3/6/2023 uncorrected serum  calcium 10.5 (ULN 10.2 mg/dL) without albumin, 3/17/2023: uncorrected serum calcium 10.3 mg/dL (ULN 10.2) with albumin 4.5 g/dL, 4/10/2023 (this was the day of cardiac surgery): uncorrected serum calcium 10.3 mg/dL (ULN 10.2) with albumin 3.4 g/dL and on this day ionized calcium was both high and low   -Both hypo and hyperphosphatemia at times  -Only 2 PTH assays to date: 11/2/2021: corrected serum calcium 10.1 (ULN 10.1 mg/dL) with uncorrected serum calcium 10.2 mg/dL and albumin 4.1 g/dL, PTH 31 (ref range 18 - 80 pg/mL); 2/13/2023:  (ULN 65 pg/mL), uncorrected serum calcium 10.0 mg/dL (ULN 10.2), albumin 4.7 g/dL, GFR 42, 25-OH vitamin D mid normal    -11/5/2021: 24 hour urine calcium undetectable (2.4 L); 2/2023: 24 hour urine (1.6 L) calcium 150 mg   -11/2/2021: bone specific alkaline phosphatase mid to upper pre-menopausal range, B-CTX in the lower premenopausal range    -2/2023: 25-OH vitamin D mid normal   -MGUS present on testing 11/2022 (this was ordered by the inpatient team) and I did let her hematologist know of this interim finding     She believes she was on weekly Fosamax from ~ 2005 until 2017. However, this is in contrast to her notes in the medical record that state Fosamax for 6-7 years in the early 2000s. She then restarted Fosamax 70 mg once weekly in 1/2019 and took it until 4/2021. She may have been off of Fosamax for a few months in the spring of 2020. Fosamax has been well tolerated. No other prior pharmacologic therapy to reduce fracture risk.    11/12/2021: restarted Fosamax and she continues it to date (2/10/2023)     8/29/2023: She takes a calcium supplement daily, vitamin D daily, a multivitamin daily, and has 1-3 servings of calcium daily. She continues on Fosamax and it is well-tolerated and she takes it appropriately to maximize absorption and minimize the risk of esophagitis.  Her only missed doses of Fosamax have been while she was hospitalized.      No recent or upcoming  dental extraction. No GERD. Complex cardiac history, see cardiology note 2/9/2023, and has mild non-obstructive CAD on angiogram 11/2022. CT imaging has shown atherosclerosis in the abdominal aorta. No prior ASCVD event. Jardiance was prescribed 12/2022 by cardiology given her CHF.      # Thyroid nodularity     CT chest 2019: multinodular thyroid gland with extension into the mediastinum, stable compared with 2015    Thyroid US completed 11/2/2021 (only thyroid US to date) reviewed in detail and I agree with the radiology interpretation. Thyroid gland is normal in size. Multiple sub centimeter nodules with only 1 nodule >= 1 cm and this is a 1.8 cm in maximal dimension nodule in the isthmus that is solid and isoechoic without suspicious features and is TR-3.    Neck US 8/2/2023: I reviewed the images in detail, comparison US used by radiology was 11/2/2021  -RIGHT lobe: 3.1 x 2.6 x 1.0 cm.   -LEFT lobe: 2.8 x 1.2 x 0.9 cm.  -NECK: No cervical lymphadenopathy.  -NODULES:     Nodule 1: Right mid thyroid nodule measuring 5 x 5 x 4 mm, previously 5 x 5 x 4 mm, TR-2 per radiology, on my review it's a pure cyst      Nodule 2: Right mid thyroid nodule measuring 5 x 5 x 4 mm, previously 5 x 4 x 4 mm, TR-2 per radiology, on my review it's a pure cyst     Nodule 3: Left upper pole thyroid nodule measuring 5 x 5 x 3 mm, previously 5 x 5 x 3 mm, TR-1 per radiology, on my review it's a pure cyst     Nodule 4: Left mid pole thyroid nodule measuring 5 x 4 x 4 mm, previously 5 x 4 x 4 mm, TR-2 per radiology, on my review it's a pure cyst with a small amount of hyperechoic internal debris and is taller than wide as well      Nodule 5: Inferior isthmus nodule measuring 15 x 15 x 10 mm, previously 18 x 17 x 11 mm, I agree with radiology that it's TR-3     No radiation therapy. No FH of thyroid pathology. No FH of thyroid cancer. No prior thyroid biopsy. No prior use of thyroid hormone. No history of abnormal thyroid function  testing.    TSH 3.4 2/2023 8/29/2023: no compressive symptoms      # Dysglycemia     Has had HbA1c in the prediabetic range for a few years. Fasting glucose 126 mg/dL 10/13/2021.  mg/dL 10/15/2021. She has not previously been on medication to lower glucose before Jardiance. HbA1c 5.9% 3/2023. Some recent glucose values elevated but <200 mg/dL (except for on 4/10/23, which was the day of cardiac surgery) - unclear if any were fasting, most were probably during her hospitalizations.       Jardiance was prescribed 12/2022 by cardiology given her CHF.  Follow-up in this regard per her PCP.    Objective:    BMI 22.3 kg/m2, /64.  Pleasant and conversational.  Appears mildly cushingoid.    2/2023: BMI 22.7 kg/m2. /62. Appears mildly cushingoid. Thyroid about 20 grams and nodular. No cervical LAD. Dentition in good repair, there is an area of soft tissue ulceration inferior and lateral to the tongue. Mild kyphosis.    Assessment/Plan:    # Osteoporosis, glucocorticoid associated     BMD has declined.  We suspect that this is because of the interruptions in therapy with Fosamax and she was off therapy for some time between her 2 DEXA scans.  We will transition to zoledronic acid 5 mg IV given over 60 minutes.  We reviewed potential adverse effects in detail including osteonecrosis of the jaw, atypical femoral fracture, and flulike reaction.  Amelia already takes scheduled Tylenol which will minimize the risk of flulike reaction.    Prior to Reclast we will update bone labs and this is ordered.  If serum calcium remains elevated we will need to investigate this further.    Once those labs are back I will place orders before our return visit in 1 year to include labs and a DEXA.    She will continue to aim for 1200 mg of calcium intake daily via diet and I did give her the handout of calcium sources in foods.  Continue her current dose of vitamin D.    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  She is mildly cushingoid on exam (likely from prior supraphysiologic exposure).    Continue to follow sick day rules.    When sick she will take prednisone 10 mg daily for 3 days or until back to baseline.  In the event that she is unable to take oral prednisone she will inject dexamethasone 4 mg IM and this is prescribed and then proceed to the ER.  In the event of surgery/procedure she should receive IV hydrocortisone 100 mg on-call to the OR followed by a taper and the surgeon can contact me for specific dosing recommendations. Medical alert bracelet that states secondary adrenal insufficiency is also recommended.    # Thyroid nodularity    Will follow with physical exam for now. Check TSH annually.      55 minutes spent on the date of the encounter doing chart review, history and exam, documentation and further activities as noted above.

## 2023-08-29 ENCOUNTER — OFFICE VISIT (OUTPATIENT)
Dept: ENDOCRINOLOGY | Facility: CLINIC | Age: 63
End: 2023-08-29
Payer: COMMERCIAL

## 2023-08-29 VITALS
OXYGEN SATURATION: 98 % | WEIGHT: 107.8 LBS | HEART RATE: 79 BPM | DIASTOLIC BLOOD PRESSURE: 64 MMHG | BODY MASS INDEX: 22.32 KG/M2 | SYSTOLIC BLOOD PRESSURE: 110 MMHG

## 2023-08-29 DIAGNOSIS — R73.01 IMPAIRED FASTING GLUCOSE: ICD-10-CM

## 2023-08-29 DIAGNOSIS — Z79.52 ON PREDNISONE THERAPY: ICD-10-CM

## 2023-08-29 DIAGNOSIS — M06.9 RHEUMATOID ARTHRITIS OF BOTH ANKLES, UNSPECIFIED WHETHER RHEUMATOID FACTOR PRESENT (H): ICD-10-CM

## 2023-08-29 DIAGNOSIS — E83.52 HYPERCALCEMIA: ICD-10-CM

## 2023-08-29 DIAGNOSIS — M81.8 STEROID-INDUCED OSTEOPOROSIS: Primary | ICD-10-CM

## 2023-08-29 DIAGNOSIS — E24.2 IATROGENIC CUSHINGOID FEATURES (H): ICD-10-CM

## 2023-08-29 DIAGNOSIS — E27.49 SECONDARY ADRENAL INSUFFICIENCY (H): ICD-10-CM

## 2023-08-29 DIAGNOSIS — D47.2 MGUS (MONOCLONAL GAMMOPATHY OF UNKNOWN SIGNIFICANCE): ICD-10-CM

## 2023-08-29 DIAGNOSIS — E04.2 NONTOXIC MULTINODULAR GOITER: ICD-10-CM

## 2023-08-29 DIAGNOSIS — T38.0X5A STEROID-INDUCED OSTEOPOROSIS: Primary | ICD-10-CM

## 2023-08-29 DIAGNOSIS — C83.30 DIFFUSE LARGE B-CELL LYMPHOMA, UNSPECIFIED BODY REGION (H): ICD-10-CM

## 2023-08-29 PROCEDURE — 99215 OFFICE O/P EST HI 40 MIN: CPT | Performed by: INTERNAL MEDICINE

## 2023-08-29 RX ORDER — HEPARIN SODIUM,PORCINE 10 UNIT/ML
5-20 VIAL (ML) INTRAVENOUS DAILY PRN
Status: CANCELLED | OUTPATIENT
Start: 2023-09-13

## 2023-08-29 RX ORDER — ZOLEDRONIC ACID 5 MG/100ML
5 INJECTION, SOLUTION INTRAVENOUS ONCE
Status: CANCELLED
Start: 2023-09-13

## 2023-08-29 RX ORDER — EPINEPHRINE 1 MG/ML
0.3 INJECTION, SOLUTION, CONCENTRATE INTRAVENOUS EVERY 5 MIN PRN
Status: CANCELLED | OUTPATIENT
Start: 2023-09-13

## 2023-08-29 RX ORDER — DIPHENHYDRAMINE HYDROCHLORIDE 50 MG/ML
50 INJECTION INTRAMUSCULAR; INTRAVENOUS
Status: CANCELLED
Start: 2023-09-13

## 2023-08-29 RX ORDER — HEPARIN SODIUM (PORCINE) LOCK FLUSH IV SOLN 100 UNIT/ML 100 UNIT/ML
5 SOLUTION INTRAVENOUS
Status: CANCELLED | OUTPATIENT
Start: 2023-09-13

## 2023-08-29 RX ORDER — ALBUTEROL SULFATE 90 UG/1
1-2 AEROSOL, METERED RESPIRATORY (INHALATION)
Status: CANCELLED
Start: 2023-09-13

## 2023-08-29 RX ORDER — ALBUTEROL SULFATE 0.83 MG/ML
2.5 SOLUTION RESPIRATORY (INHALATION)
Status: CANCELLED | OUTPATIENT
Start: 2023-09-13

## 2023-08-29 RX ORDER — METHYLPREDNISOLONE SODIUM SUCCINATE 125 MG/2ML
125 INJECTION, POWDER, LYOPHILIZED, FOR SOLUTION INTRAMUSCULAR; INTRAVENOUS
Status: CANCELLED
Start: 2023-09-13

## 2023-08-29 ASSESSMENT — ENCOUNTER SYMPTOMS
POLYPHAGIA: 0
FATIGUE: 1
LOSS OF CONSCIOUSNESS: 0
JOINT SWELLING: 0
ARTHRALGIAS: 1
DECREASED APPETITE: 0
STIFFNESS: 1
DIZZINESS: 1
MUSCLE WEAKNESS: 1
NUMBNESS: 1
SEIZURES: 0
NECK PAIN: 0
FEVER: 0
BRUISES/BLEEDS EASILY: 1
MYALGIAS: 1
CHILLS: 0
PARALYSIS: 0
MEMORY LOSS: 1
TREMORS: 0
DISTURBANCES IN COORDINATION: 1
MUSCLE CRAMPS: 1
HEMATURIA: 0
SWOLLEN GLANDS: 0
HALLUCINATIONS: 0
ALTERED TEMPERATURE REGULATION: 1
POOR WOUND HEALING: 0
WEIGHT LOSS: 0
SKIN CHANGES: 0
WEAKNESS: 1
DIFFICULTY URINATING: 0
DYSURIA: 0
WEIGHT GAIN: 0
NIGHT SWEATS: 0
FLANK PAIN: 0
TINGLING: 1
SPEECH CHANGE: 1
HEADACHES: 0
INCREASED ENERGY: 0
BACK PAIN: 1
POLYDIPSIA: 0

## 2023-08-29 NOTE — LETTER
8/29/2023         RE: Amelia Michel  [address]  [facility] [id number]        Dear Colleague,    Thank you for referring your patient, Amelia Michel, to the [facility]. Please see a copy of my visit note below.    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 The following is a comprehensive summary of her Endocrine care to date.      # Osteoporosis     We previously reviewed the osteoporosis dictation template.    DEXA 3/30/2021:  -lumbar spine non diagnostic  -lowest T-score -2.0 at the right femoral neck with an increase in the total mean hip BMD by 6.1% (significant) since 2018     DEXA 8/2/2023:  -lumbar spine non diagnostic  -lowest overall T-score at the hips is -2.3 at the left total hip; BMD at the total mean hip has decreased a significant -4.6% compared to 3/2021     Thoracic/lumbar spine XR 11/2/2021 negative for vertebral compression fracture.    CXR 1/2023: negative for compression fracture     No prior fractures. She has poor balance. No prior nephrolithiasis.     She has rheumatoid arthritis and has been on prednisone daily since her mid 30s, initially 3 mg daily and since 2017 she has been on prednisone 5 mg daily before reducing back to 3 mg daily. She had been on methotrexate previously but it was stopped remotely. Outside of chemotherapy for diffuse large B cell lymphoma in 2017 (she has never had radiation therapy, her DLBCL was treated only with chemotherapy in 2017), no high dose glucocorticoid exposure. No other GC exposure outside of prednisone. She has been on apixaban since 2017.       As of 2/2023 Amelia has completed a complete evaluation for secondary causes of increased fracture risk with the following notable findings:  -Has had several episodes of mild hypercalcemia over the years:      -2017: uncorrected serum calcium 10.2 mg/dL (ULN 10.1) with mildly low  albumin, 10/2021: uncorrected serum calcium 10.2 (ULN 10.1 mg/dL) without an albumin, 4/2022: corrected serum calcium (albumin 4.4 g/dL) 10.8 mg/dl (ULN 10.1), 5/24/2022 uncorrected serum calcium 10.8 mg/dL with no albumin; 3/6/2023 uncorrected serum calcium 10.5 (ULN 10.2 mg/dL) without albumin, 3/17/2023: uncorrected serum calcium 10.3 mg/dL (ULN 10.2) with albumin 4.5 g/dL, 4/10/2023 (this was the day of cardiac surgery): uncorrected serum calcium 10.3 mg/dL (ULN 10.2) with albumin 3.4 g/dL and on this day ionized calcium was both high and low   -Both hypo and hyperphosphatemia at times  -Only 2 PTH assays to date: 11/2/2021: corrected serum calcium 10.1 (ULN 10.1 mg/dL) with uncorrected serum calcium 10.2 mg/dL and albumin 4.1 g/dL, PTH 31 (ref range 18 - 80 pg/mL); 2/13/2023:  (ULN 65 pg/mL), uncorrected serum calcium 10.0 mg/dL (ULN 10.2), albumin 4.7 g/dL, GFR 42, 25-OH vitamin D mid normal    -11/5/2021: 24 hour urine calcium undetectable (2.4 L); 2/2023: 24 hour urine (1.6 L) calcium 150 mg   -11/2/2021: bone specific alkaline phosphatase mid to upper pre-menopausal range, B-CTX in the lower premenopausal range    -2/2023: 25-OH vitamin D mid normal   -MGUS present on testing 11/2022 (this was ordered by the inpatient team) and I did let her hematologist know of this interim finding     She believes she was on weekly Fosamax from ~ 2005 until 2017. However, this is in contrast to her notes in the medical record that state Fosamax for 6-7 years in the early 2000s. She then restarted Fosamax 70 mg once weekly in 1/2019 and took it until 4/2021. She may have been off of Fosamax for a few months in the spring of 2020. Fosamax has been well tolerated. No other prior pharmacologic therapy to reduce fracture risk.    11/12/2021: restarted Fosamax and she continues it to date (2/10/2023)     8/29/2023: She takes a calcium supplement daily, vitamin D daily, a multivitamin daily, and has 1-3 servings of  calcium daily. She continues on Fosamax and it is well-tolerated and she takes it appropriately to maximize absorption and minimize the risk of esophagitis.  Her only missed doses of Fosamax have been while she was hospitalized.      No recent or upcoming dental extraction. No GERD. Complex cardiac history, see cardiology note 2/9/2023, and has mild non-obstructive CAD on angiogram 11/2022. CT imaging has shown atherosclerosis in the abdominal aorta. No prior ASCVD event. Jardiance was prescribed 12/2022 by cardiology given her CHF.      # Thyroid nodularity     CT chest 2019: multinodular thyroid gland with extension into the mediastinum, stable compared with 2015    Thyroid US completed 11/2/2021 (only thyroid US to date) reviewed in detail and I agree with the radiology interpretation. Thyroid gland is normal in size. Multiple sub centimeter nodules with only 1 nodule >= 1 cm and this is a 1.8 cm in maximal dimension nodule in the isthmus that is solid and isoechoic without suspicious features and is TR-3.    Neck US 8/2/2023: I reviewed the images in detail, comparison US used by radiology was 11/2/2021  -RIGHT lobe: 3.1 x 2.6 x 1.0 cm.   -LEFT lobe: 2.8 x 1.2 x 0.9 cm.  -NECK: No cervical lymphadenopathy.  -NODULES:     Nodule 1: Right mid thyroid nodule measuring 5 x 5 x 4 mm, previously 5 x 5 x 4 mm, TR-2 per radiology, on my review it's a pure cyst      Nodule 2: Right mid thyroid nodule measuring 5 x 5 x 4 mm, previously 5 x 4 x 4 mm, TR-2 per radiology, on my review it's a pure cyst     Nodule 3: Left upper pole thyroid nodule measuring 5 x 5 x 3 mm, previously 5 x 5 x 3 mm, TR-1 per radiology, on my review it's a pure cyst     Nodule 4: Left mid pole thyroid nodule measuring 5 x 4 x 4 mm, previously 5 x 4 x 4 mm, TR-2 per radiology, on my review it's a pure cyst with a small amount of hyperechoic internal debris and is taller than wide as well      Nodule 5: Inferior isthmus nodule measuring 15 x 15 x 10  mm, previously 18 x 17 x 11 mm, I agree with radiology that it's TR-3     No radiation therapy. No FH of thyroid pathology. No FH of thyroid cancer. No prior thyroid biopsy. No prior use of thyroid hormone. No history of abnormal thyroid function testing.    TSH 3.4 2/2023 8/29/2023: no compressive symptoms      # Dysglycemia     Has had HbA1c in the prediabetic range for a few years. Fasting glucose 126 mg/dL 10/13/2021.  mg/dL 10/15/2021. She has not previously been on medication to lower glucose before Jardiance. HbA1c 5.9% 3/2023. Some recent glucose values elevated but <200 mg/dL (except for on 4/10/23, which was the day of cardiac surgery) - unclear if any were fasting, most were probably during her hospitalizations.       Jardiance was prescribed 12/2022 by cardiology given her CHF.  Follow-up in this regard per her PCP.    Objective:    BMI 22.3 kg/m2, /64.  Pleasant and conversational.  Appears mildly cushingoid.    2/2023: BMI 22.7 kg/m2. /62. Appears mildly cushingoid. Thyroid about 20 grams and nodular. No cervical LAD. Dentition in good repair, there is an area of soft tissue ulceration inferior and lateral to the tongue. Mild kyphosis.    Assessment/Plan:    # Osteoporosis, glucocorticoid associated     BMD has declined.  We suspect that this is because of the interruptions in therapy with Fosamax and she was off therapy for some time between her 2 DEXA scans.  We will transition to zoledronic acid 5 mg IV given over 60 minutes.  We reviewed potential adverse effects in detail including osteonecrosis of the jaw, atypical femoral fracture, and flulike reaction.  Amelia already takes scheduled Tylenol which will minimize the risk of flulike reaction.    Prior to Reclast we will update bone labs and this is ordered.  If serum calcium remains elevated we will need to investigate this further.    Once those labs are back I will place orders before our return visit in 1 year to  include labs and a DEXA.    She will continue to aim for 1200 mg of calcium intake daily via diet and I did give her the handout of calcium sources in foods.  Continue her current dose of vitamin D.    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  She is mildly cushingoid on exam (likely from prior supraphysiologic exposure).    Continue to follow sick day rules.    When sick she will take prednisone 10 mg daily for 3 days or until back to baseline.  In the event that she is unable to take oral prednisone she will inject dexamethasone 4 mg IM and this is prescribed and then proceed to the ER.  In the event of surgery/procedure she should receive IV hydrocortisone 100 mg on-call to the OR followed by a taper and the surgeon can contact me for specific dosing recommendations. Medical alert bracelet that states secondary adrenal insufficiency is also recommended.    # Thyroid nodularity    Will follow with physical exam for now. Check TSH annually.      55 minutes spent on the date of the encounter doing chart review, history and exam, documentation and further activities as noted above.       Again, thank you for allowing me to participate in the care of your patient.        Sincerely,        Dima Shrestha MD

## 2023-09-01 LAB
MDC_IDC_EPISODE_DTM: NORMAL
MDC_IDC_EPISODE_DURATION: 0 S
MDC_IDC_EPISODE_DURATION: 0 S
MDC_IDC_EPISODE_DURATION: 1 S
MDC_IDC_EPISODE_DURATION: 10 S
MDC_IDC_EPISODE_DURATION: 10 S
MDC_IDC_EPISODE_DURATION: 100 S
MDC_IDC_EPISODE_DURATION: 1004 S
MDC_IDC_EPISODE_DURATION: 111 S
MDC_IDC_EPISODE_DURATION: 12 S
MDC_IDC_EPISODE_DURATION: 122 S
MDC_IDC_EPISODE_DURATION: 123 S
MDC_IDC_EPISODE_DURATION: 13 S
MDC_IDC_EPISODE_DURATION: 15 S
MDC_IDC_EPISODE_DURATION: 159 S
MDC_IDC_EPISODE_DURATION: 17 S
MDC_IDC_EPISODE_DURATION: 18 S
MDC_IDC_EPISODE_DURATION: 180 S
MDC_IDC_EPISODE_DURATION: 181 S
MDC_IDC_EPISODE_DURATION: 182 S
MDC_IDC_EPISODE_DURATION: 182 S
MDC_IDC_EPISODE_DURATION: 183 S
MDC_IDC_EPISODE_DURATION: 185 S
MDC_IDC_EPISODE_DURATION: 191 S
MDC_IDC_EPISODE_DURATION: 2 S
MDC_IDC_EPISODE_DURATION: 20 S
MDC_IDC_EPISODE_DURATION: 20 S
MDC_IDC_EPISODE_DURATION: 2082 S
MDC_IDC_EPISODE_DURATION: 2330 S
MDC_IDC_EPISODE_DURATION: 2379 S
MDC_IDC_EPISODE_DURATION: 24 S
MDC_IDC_EPISODE_DURATION: 25 S
MDC_IDC_EPISODE_DURATION: 254 S
MDC_IDC_EPISODE_DURATION: 278 S
MDC_IDC_EPISODE_DURATION: 29 S
MDC_IDC_EPISODE_DURATION: 29 S
MDC_IDC_EPISODE_DURATION: 3 S
MDC_IDC_EPISODE_DURATION: 3155 S
MDC_IDC_EPISODE_DURATION: 3190 S
MDC_IDC_EPISODE_DURATION: 3565 S
MDC_IDC_EPISODE_DURATION: 36 S
MDC_IDC_EPISODE_DURATION: 37 S
MDC_IDC_EPISODE_DURATION: 392 S
MDC_IDC_EPISODE_DURATION: 4 S
MDC_IDC_EPISODE_DURATION: 42 S
MDC_IDC_EPISODE_DURATION: 42 S
MDC_IDC_EPISODE_DURATION: 4221 S
MDC_IDC_EPISODE_DURATION: 43 S
MDC_IDC_EPISODE_DURATION: 4353 S
MDC_IDC_EPISODE_DURATION: 47 S
MDC_IDC_EPISODE_DURATION: 5 S
MDC_IDC_EPISODE_DURATION: 5 S
MDC_IDC_EPISODE_DURATION: 51 S
MDC_IDC_EPISODE_DURATION: 5405 S
MDC_IDC_EPISODE_DURATION: 6 S
MDC_IDC_EPISODE_DURATION: 6 S
MDC_IDC_EPISODE_DURATION: 63 S
MDC_IDC_EPISODE_DURATION: 6652 S
MDC_IDC_EPISODE_DURATION: 67 S
MDC_IDC_EPISODE_DURATION: 7 S
MDC_IDC_EPISODE_DURATION: 71 S
MDC_IDC_EPISODE_DURATION: 7227 S
MDC_IDC_EPISODE_DURATION: 73 S
MDC_IDC_EPISODE_DURATION: 7775 S
MDC_IDC_EPISODE_DURATION: 79 S
MDC_IDC_EPISODE_DURATION: 8 S
MDC_IDC_EPISODE_DURATION: 88 S
MDC_IDC_EPISODE_DURATION: 8831 S
MDC_IDC_EPISODE_DURATION: 908 S
MDC_IDC_EPISODE_DURATION: 92 S
MDC_IDC_EPISODE_DURATION: 93 S
MDC_IDC_EPISODE_DURATION: 937 S
MDC_IDC_EPISODE_DURATION: NORMAL S
MDC_IDC_EPISODE_ID: NORMAL
MDC_IDC_EPISODE_TYPE: NORMAL
MDC_IDC_LEAD_IMPLANT_DT: NORMAL
MDC_IDC_LEAD_IMPLANT_DT: NORMAL
MDC_IDC_LEAD_LOCATION: NORMAL
MDC_IDC_LEAD_LOCATION: NORMAL
MDC_IDC_LEAD_LOCATION_DETAIL_1: NORMAL
MDC_IDC_LEAD_LOCATION_DETAIL_1: NORMAL
MDC_IDC_LEAD_MFG: NORMAL
MDC_IDC_LEAD_MFG: NORMAL
MDC_IDC_LEAD_MODEL: NORMAL
MDC_IDC_LEAD_MODEL: NORMAL
MDC_IDC_LEAD_POLARITY_TYPE: NORMAL
MDC_IDC_LEAD_POLARITY_TYPE: NORMAL
MDC_IDC_LEAD_SERIAL: NORMAL
MDC_IDC_LEAD_SERIAL: NORMAL
MDC_IDC_LEAD_SPECIAL_FUNCTION: NORMAL
MDC_IDC_LEAD_SPECIAL_FUNCTION: NORMAL
MDC_IDC_MSMT_BATTERY_DTM: NORMAL
MDC_IDC_MSMT_BATTERY_REMAINING_LONGEVITY: 127 MO
MDC_IDC_MSMT_BATTERY_RRT_TRIGGER: 2.62
MDC_IDC_MSMT_BATTERY_STATUS: NORMAL
MDC_IDC_MSMT_BATTERY_VOLTAGE: 3.01 V
MDC_IDC_MSMT_LEADCHNL_RA_IMPEDANCE_VALUE: 266 OHM
MDC_IDC_MSMT_LEADCHNL_RA_IMPEDANCE_VALUE: 380 OHM
MDC_IDC_MSMT_LEADCHNL_RA_PACING_THRESHOLD_AMPLITUDE: 0.5 V
MDC_IDC_MSMT_LEADCHNL_RA_PACING_THRESHOLD_PULSEWIDTH: 0.4 MS
MDC_IDC_MSMT_LEADCHNL_RA_SENSING_INTR_AMPL: 0.88 MV
MDC_IDC_MSMT_LEADCHNL_RA_SENSING_INTR_AMPL: 0.88 MV
MDC_IDC_MSMT_LEADCHNL_RV_IMPEDANCE_VALUE: 342 OHM
MDC_IDC_MSMT_LEADCHNL_RV_IMPEDANCE_VALUE: 437 OHM
MDC_IDC_MSMT_LEADCHNL_RV_PACING_THRESHOLD_AMPLITUDE: 0.75 V
MDC_IDC_MSMT_LEADCHNL_RV_PACING_THRESHOLD_PULSEWIDTH: 0.4 MS
MDC_IDC_MSMT_LEADCHNL_RV_SENSING_INTR_AMPL: 8.88 MV
MDC_IDC_MSMT_LEADCHNL_RV_SENSING_INTR_AMPL: 8.88 MV
MDC_IDC_PG_IMPLANT_DTM: NORMAL
MDC_IDC_PG_MFG: NORMAL
MDC_IDC_PG_MODEL: NORMAL
MDC_IDC_PG_SERIAL: NORMAL
MDC_IDC_PG_TYPE: NORMAL
MDC_IDC_SESS_CLINIC_NAME: NORMAL
MDC_IDC_SESS_DTM: NORMAL
MDC_IDC_SESS_TYPE: NORMAL
MDC_IDC_SET_BRADY_AT_MODE_SWITCH_RATE: 171 {BEATS}/MIN
MDC_IDC_SET_BRADY_HYSTRATE: NORMAL
MDC_IDC_SET_BRADY_LOWRATE: 75 {BEATS}/MIN
MDC_IDC_SET_BRADY_MAX_SENSOR_RATE: 130 {BEATS}/MIN
MDC_IDC_SET_BRADY_MAX_TRACKING_RATE: 130 {BEATS}/MIN
MDC_IDC_SET_BRADY_MODE: NORMAL
MDC_IDC_SET_BRADY_PAV_DELAY_LOW: 180 MS
MDC_IDC_SET_BRADY_SAV_DELAY_LOW: 150 MS
MDC_IDC_SET_LEADCHNL_RA_PACING_AMPLITUDE: 1.5 V
MDC_IDC_SET_LEADCHNL_RA_PACING_ANODE_ELECTRODE_1: NORMAL
MDC_IDC_SET_LEADCHNL_RA_PACING_ANODE_LOCATION_1: NORMAL
MDC_IDC_SET_LEADCHNL_RA_PACING_CAPTURE_MODE: NORMAL
MDC_IDC_SET_LEADCHNL_RA_PACING_CATHODE_ELECTRODE_1: NORMAL
MDC_IDC_SET_LEADCHNL_RA_PACING_CATHODE_LOCATION_1: NORMAL
MDC_IDC_SET_LEADCHNL_RA_PACING_POLARITY: NORMAL
MDC_IDC_SET_LEADCHNL_RA_PACING_PULSEWIDTH: 0.4 MS
MDC_IDC_SET_LEADCHNL_RA_SENSING_ANODE_ELECTRODE_1: NORMAL
MDC_IDC_SET_LEADCHNL_RA_SENSING_ANODE_LOCATION_1: NORMAL
MDC_IDC_SET_LEADCHNL_RA_SENSING_CATHODE_ELECTRODE_1: NORMAL
MDC_IDC_SET_LEADCHNL_RA_SENSING_CATHODE_LOCATION_1: NORMAL
MDC_IDC_SET_LEADCHNL_RA_SENSING_POLARITY: NORMAL
MDC_IDC_SET_LEADCHNL_RA_SENSING_SENSITIVITY: 0.3 MV
MDC_IDC_SET_LEADCHNL_RV_PACING_AMPLITUDE: 2 V
MDC_IDC_SET_LEADCHNL_RV_PACING_ANODE_ELECTRODE_1: NORMAL
MDC_IDC_SET_LEADCHNL_RV_PACING_ANODE_LOCATION_1: NORMAL
MDC_IDC_SET_LEADCHNL_RV_PACING_CAPTURE_MODE: NORMAL
MDC_IDC_SET_LEADCHNL_RV_PACING_CATHODE_ELECTRODE_1: NORMAL
MDC_IDC_SET_LEADCHNL_RV_PACING_CATHODE_LOCATION_1: NORMAL
MDC_IDC_SET_LEADCHNL_RV_PACING_POLARITY: NORMAL
MDC_IDC_SET_LEADCHNL_RV_PACING_PULSEWIDTH: 0.4 MS
MDC_IDC_SET_LEADCHNL_RV_SENSING_ANODE_ELECTRODE_1: NORMAL
MDC_IDC_SET_LEADCHNL_RV_SENSING_ANODE_LOCATION_1: NORMAL
MDC_IDC_SET_LEADCHNL_RV_SENSING_CATHODE_ELECTRODE_1: NORMAL
MDC_IDC_SET_LEADCHNL_RV_SENSING_CATHODE_LOCATION_1: NORMAL
MDC_IDC_SET_LEADCHNL_RV_SENSING_POLARITY: NORMAL
MDC_IDC_SET_LEADCHNL_RV_SENSING_SENSITIVITY: 0.9 MV
MDC_IDC_SET_ZONE_DETECTION_INTERVAL: 350 MS
MDC_IDC_SET_ZONE_DETECTION_INTERVAL: 400 MS
MDC_IDC_SET_ZONE_TYPE: NORMAL
MDC_IDC_STAT_AT_BURDEN_PERCENT: 53.8 %
MDC_IDC_STAT_AT_DTM_END: NORMAL
MDC_IDC_STAT_AT_DTM_START: NORMAL
MDC_IDC_STAT_BRADY_AP_VP_PERCENT: 57.37 %
MDC_IDC_STAT_BRADY_AP_VS_PERCENT: 8.32 %
MDC_IDC_STAT_BRADY_AS_VP_PERCENT: 11.47 %
MDC_IDC_STAT_BRADY_AS_VS_PERCENT: 23 %
MDC_IDC_STAT_BRADY_DTM_END: NORMAL
MDC_IDC_STAT_BRADY_DTM_START: NORMAL
MDC_IDC_STAT_BRADY_RA_PERCENT_PACED: 31.43 %
MDC_IDC_STAT_BRADY_RV_PERCENT_PACED: 45.72 %
MDC_IDC_STAT_EPISODE_RECENT_COUNT: 0
MDC_IDC_STAT_EPISODE_RECENT_COUNT: 1
MDC_IDC_STAT_EPISODE_RECENT_COUNT: 160
MDC_IDC_STAT_EPISODE_RECENT_COUNT: 31
MDC_IDC_STAT_EPISODE_RECENT_COUNT: 594
MDC_IDC_STAT_EPISODE_RECENT_COUNT_DTM_END: NORMAL
MDC_IDC_STAT_EPISODE_RECENT_COUNT_DTM_START: NORMAL
MDC_IDC_STAT_EPISODE_TOTAL_COUNT: 0
MDC_IDC_STAT_EPISODE_TOTAL_COUNT: 3851
MDC_IDC_STAT_EPISODE_TOTAL_COUNT: 394
MDC_IDC_STAT_EPISODE_TOTAL_COUNT: 596
MDC_IDC_STAT_EPISODE_TOTAL_COUNT: NORMAL
MDC_IDC_STAT_EPISODE_TOTAL_COUNT_DTM_END: NORMAL
MDC_IDC_STAT_EPISODE_TOTAL_COUNT_DTM_START: NORMAL
MDC_IDC_STAT_EPISODE_TYPE: NORMAL

## 2023-09-13 DIAGNOSIS — I50.22 CHRONIC SYSTOLIC HEART FAILURE (H): Primary | ICD-10-CM

## 2023-09-14 ENCOUNTER — ONCOLOGY VISIT (OUTPATIENT)
Dept: ONCOLOGY | Facility: CLINIC | Age: 63
End: 2023-09-14
Attending: NURSE PRACTITIONER
Payer: COMMERCIAL

## 2023-09-14 ENCOUNTER — APPOINTMENT (OUTPATIENT)
Dept: LAB | Facility: CLINIC | Age: 63
End: 2023-09-14
Attending: NURSE PRACTITIONER
Payer: COMMERCIAL

## 2023-09-14 VITALS
OXYGEN SATURATION: 100 % | DIASTOLIC BLOOD PRESSURE: 78 MMHG | RESPIRATION RATE: 16 BRPM | WEIGHT: 109.3 LBS | TEMPERATURE: 98.5 F | HEART RATE: 94 BPM | SYSTOLIC BLOOD PRESSURE: 120 MMHG | BODY MASS INDEX: 22.63 KG/M2

## 2023-09-14 DIAGNOSIS — C83.30 DIFFUSE LARGE B-CELL LYMPHOMA, UNSPECIFIED BODY REGION (H): Primary | ICD-10-CM

## 2023-09-14 DIAGNOSIS — M81.8 STEROID-INDUCED OSTEOPOROSIS: ICD-10-CM

## 2023-09-14 DIAGNOSIS — T38.0X5A STEROID-INDUCED OSTEOPOROSIS: ICD-10-CM

## 2023-09-14 DIAGNOSIS — R73.01 IMPAIRED FASTING GLUCOSE: ICD-10-CM

## 2023-09-14 LAB
ALBUMIN SERPL BCG-MCNC: 4.9 G/DL (ref 3.5–5.2)
ALP SERPL-CCNC: 116 U/L (ref 35–129)
ALT SERPL W P-5'-P-CCNC: 22 U/L (ref 0–70)
ANION GAP SERPL CALCULATED.3IONS-SCNC: 10 MMOL/L (ref 7–15)
AST SERPL W P-5'-P-CCNC: 38 U/L (ref 0–45)
BASOPHILS # BLD AUTO: 0.1 10E3/UL (ref 0–0.2)
BASOPHILS NFR BLD AUTO: 1 %
BILIRUB SERPL-MCNC: 0.9 MG/DL
BUN SERPL-MCNC: 31.5 MG/DL (ref 8–23)
CALCIUM SERPL-MCNC: 10.2 MG/DL (ref 8.8–10.2)
CALCIUM SERPL-MCNC: 10.2 MG/DL (ref 8.8–10.2)
CHLORIDE SERPL-SCNC: 93 MMOL/L (ref 98–107)
CREAT SERPL-MCNC: 1.07 MG/DL (ref 0.51–1.17)
DEPRECATED HCO3 PLAS-SCNC: 30 MMOL/L (ref 22–29)
EGFRCR SERPLBLD CKD-EPI 2021: 58 ML/MIN/1.73M2
EOSINOPHIL # BLD AUTO: 0.1 10E3/UL (ref 0–0.7)
EOSINOPHIL NFR BLD AUTO: 1 %
ERYTHROCYTE [DISTWIDTH] IN BLOOD BY AUTOMATED COUNT: 12.8 % (ref 10–15)
FASTING STATUS PATIENT QL REPORTED: YES
GLUCOSE SERPL-MCNC: 109 MG/DL (ref 70–99)
GLUCOSE SERPL-MCNC: 109 MG/DL (ref 70–99)
HBA1C MFR BLD: 5.3 %
HCT VFR BLD AUTO: 43.1 % (ref 35–53)
HGB BLD-MCNC: 14.9 G/DL (ref 11.7–17.7)
IMM GRANULOCYTES # BLD: 0 10E3/UL
IMM GRANULOCYTES NFR BLD: 0 %
LDH SERPL L TO P-CCNC: 274 U/L (ref 0–250)
LYMPHOCYTES # BLD AUTO: 0.4 10E3/UL (ref 0.8–5.3)
LYMPHOCYTES NFR BLD AUTO: 8 %
MCH RBC QN AUTO: 35.8 PG (ref 26.5–33)
MCHC RBC AUTO-ENTMCNC: 34.6 G/DL (ref 31.5–36.5)
MCV RBC AUTO: 104 FL (ref 78–100)
MONOCYTES # BLD AUTO: 0.5 10E3/UL (ref 0–1.3)
MONOCYTES NFR BLD AUTO: 9 %
NEUTROPHILS # BLD AUTO: 4.4 10E3/UL (ref 1.6–8.3)
NEUTROPHILS NFR BLD AUTO: 81 %
NRBC # BLD AUTO: 0 10E3/UL
NRBC BLD AUTO-RTO: 0 /100
PLATELET # BLD AUTO: 124 10E3/UL (ref 150–450)
POTASSIUM SERPL-SCNC: 4.5 MMOL/L (ref 3.4–5.3)
PROT SERPL-MCNC: 8.2 G/DL (ref 6.4–8.3)
RBC # BLD AUTO: 4.16 10E6/UL (ref 3.8–5.9)
SODIUM SERPL-SCNC: 133 MMOL/L (ref 136–145)
WBC # BLD AUTO: 5.4 10E3/UL (ref 4–11)

## 2023-09-14 PROCEDURE — 83036 HEMOGLOBIN GLYCOSYLATED A1C: CPT | Performed by: NURSE PRACTITIONER

## 2023-09-14 PROCEDURE — 82306 VITAMIN D 25 HYDROXY: CPT | Performed by: NURSE PRACTITIONER

## 2023-09-14 PROCEDURE — G0463 HOSPITAL OUTPT CLINIC VISIT: HCPCS | Performed by: NURSE PRACTITIONER

## 2023-09-14 PROCEDURE — 85025 COMPLETE CBC W/AUTO DIFF WBC: CPT | Performed by: NURSE PRACTITIONER

## 2023-09-14 PROCEDURE — 80053 COMPREHEN METABOLIC PANEL: CPT | Performed by: NURSE PRACTITIONER

## 2023-09-14 PROCEDURE — 82310 ASSAY OF CALCIUM: CPT | Performed by: NURSE PRACTITIONER

## 2023-09-14 PROCEDURE — 82947 ASSAY GLUCOSE BLOOD QUANT: CPT | Performed by: NURSE PRACTITIONER

## 2023-09-14 PROCEDURE — 99215 OFFICE O/P EST HI 40 MIN: CPT | Performed by: NURSE PRACTITIONER

## 2023-09-14 PROCEDURE — 83615 LACTATE (LD) (LDH) ENZYME: CPT | Performed by: NURSE PRACTITIONER

## 2023-09-14 PROCEDURE — 36415 COLL VENOUS BLD VENIPUNCTURE: CPT | Performed by: NURSE PRACTITIONER

## 2023-09-14 ASSESSMENT — PAIN SCALES - GENERAL: PAINLEVEL: MODERATE PAIN (4)

## 2023-09-14 NOTE — PROGRESS NOTES
[facility]  Date of visit: Sep 14, 2023      Reason for Visit: follow up DLBCL      Oncology HPI:   1. Complicated patient with history of Rheumatoid Arthritis status post long term treatment with methotrexate with significant complicated course with cardiac arrest, admission with hypotension, sepsis, ICU stay and intubation with subsequent additional readmission from TCU in 6/2017 for FUO with extensive work-up with eventual finding of progression cytopenias with eventual lymphoma diagnosis  2. Bone Marrow Biopsy: 7/12/2017: Highly suspicious for involvement by B cell lymphoma with foci of large atypical CD20+ cells  3. PET CT 7/19/2017: Extensive activity: Right paratracheal lesion with SUV 28, many liver lesions with SUV 15, cardiac lesion intraarterial septum, numerous bone lesions.  4. 7/21 Liver Biopsy: Consistent with Diffuse Large B Cell Lymphoma non-germinal center phenotype  -- FISH for myc, bcl2, bcl6 negative for double-hit lymphoma  5. IPI based on Age < 60 (0 points) + PS 2 (1 + point) + Stage IV Disease (1 point) + Extranodal disease > 1 site (1 point) + elevated LDH (1 point) = 4 Poor Risk Disease  6. Cycle 1 given as R-EPOCH Day 1 = 7/27/2017  -- complications of transfusion dependence and need for acute rehab placement (likely more result of extensive hospital stays)  - LP negative for CNS involvement 8/23/2017  7. Cycle 2: Transition to R-CHOP Day 1 = 8/22/2017  - Day 15 high dose MTX for CNS prophylaxis  - complicated by significant fluid overload and PICC associated DVT  8. Cycle #3 Day 1 = 9/20/2017 Post Cycle 3 PET CR. Cycle 4 10/11/2017 + IT prophylaxis, Cycle 5 11/8/17, Cycle 6 11/29/2017 + IT prophylaxis  -- Post Treatment completion PET 1/15/2018: Complete Remission      Interval history:   Amelia is here for follow up  She is doing well-- main complaint is her left knee and hip pain and sciatica discomfort  Underwent a minimally invasive tricuspid  "valve replacement in 4/10/23  Completed cardiac rehab  Left knee was diagnosed as \"bone on bone\"-- received injections. Hoping to get imaging and injection into her hip as well  Weight has increased < 2 lbs, she aims to be right at 105 lbs.   No fevers or chills.   No bleeding. She does have significant bruising (eliquis, ASA) which tends to cause small pea- size bumps. Her bruises fade into rings which is an odd pattern but consistent.    ROS otherwise neg.                   Current Outpatient Medications   Medication Sig Dispense Refill    alendronate (FOSAMAX) 70 MG tablet Take 1 tablet (70 mg) by mouth every 7 days (Patient taking differently: Take 70 mg by mouth every 7 days Mondays) 12 tablet 3    apixaban ANTICOAGULANT (ELIQUIS ANTICOAGULANT) 5 MG tablet Take 1 tablet (5 mg) by mouth 2 times daily 180 tablet 3    aspirin (ASA) 81 MG chewable tablet 1 tablet (81 mg) by Oral or NG Tube route daily 30 tablet 2    calcium carbonate 600 mg-vitamin D 400 units (CALTRATE) 600-400 MG-UNIT per tablet Take 2 tablets by mouth daily      diltiazem ER (DILT-XR) 240 MG 24 hr ER beaded capsule Take 1 capsule (240 mg) by mouth daily 90 capsule 3    empagliflozin (JARDIANCE) 10 MG TABS tablet Take 1 tablet (10 mg) by mouth daily for 360 days 90 tablet 3    gabapentin (NEURONTIN) 100 MG capsule TAKE 1 CAPSULE BY MOUTH IN THE MORNING, 1 CAPSULE BY MOUTH IN THE AFTERNOON, AND 2 CAPSULES BY MOUTH AT BEDTIME 360 capsule 0    hydroxychloroquine (PLAQUENIL) 200 MG tablet Take 1 tablet (200 mg) by mouth daily 90 tablet 2    loratadine (CLARITIN) 10 MG tablet Take 10 mg by mouth daily      magnesium oxide 200 MG TABS Take 200 mg by mouth daily bedtime      multivitamin, therapeutic with minerals (THERA-VIT-M) TABS tablet Take 1 tablet by mouth daily 30 each 0    potassium chloride ER (KLOR-CON M) 20 MEQ CR tablet Take 3 tablets (60 mEq) by mouth daily 270 tablet 3    predniSONE (DELTASONE) 1 MG tablet Take 3 tablets (3 mg) by mouth " daily - Oral 270 tablet 1    spironolactone (ALDACTONE) 25 MG tablet Take 0.5 tablets (12.5 mg) by mouth daily 30 tablet 3    Vitamin D (Cholecalciferol) 10 MCG (400 UNIT) TABS Take 1 tablet by mouth daily      acetaminophen (TYLENOL) 325 MG tablet Take 2 tablets (650 mg) by mouth every 4 hours as needed for other (For optimal non-opioid multimodal pain management to improve pain control.) 100 tablet 1    dexamethasone (DECADRON) 4 MG/ML injection Inject 4 mg IM for adrenal crisis 2 mL 3    docusate sodium (COLACE) 100 MG capsule Take 100 mg by mouth 2 times daily as needed for constipation      metolazone (ZAROXOLYN) 2.5 MG tablet Take 1 tablet (2.5 mg) by mouth as needed (ONLY take if instructed to do so by your cardiology team.) 5 tablet 0    predniSONE (DELTASONE) 5 MG tablet 10 mg when sick for 3 days or until back to baseline for secondary adrenal insufficiency 30 tablet 3       Allergies   Allergen Reactions    Amiodarone Other (See Comments) and Difficulty breathing     Lethargic and had trouble breathing- occurred in 2017    Blood Transfusion Related (Informational Only) Hives     Hives with platelets. Give benadryl premedication.    Metoprolol Other (See Comments)     Pt and  report that metoprolol does not work for her and also reports feeling unwell with this medication. She has been able to tolerate atenolol, which as worked in controlling her HR.     Other [No Clinical Screening - See Comments]      Penicillin Allergy Skin Test not performed, see antimicrobial management team progress note 7/5/17.      Penicillins      Childhood reaction, concern for tongue swelling    Tape [Adhesive Tape] Rash         Physical Exam:  /78   Pulse 94   Temp 98.5  F (36.9  C) (Oral)   Resp 16   Wt 49.6 kg (109 lb 4.8 oz)   SpO2 100%   BMI 22.63 kg/m    General: No acute distress.  HEENT: EOMI, PERRL. Sclerae are anicteric. Oral mucosa is pink and moist with no lesions or thrush.   Lymph: Neck is supple  with no lymphadenopathy in the cervical or supraclavicular areas.   Heart: Regular rate and rhythm.   Lungs: Clear to auscultation bilaterally. Pacemaker protruding left chest wall  Abdomen: Bowel sounds present, soft, nontender with no palpable hepatosplenomegaly or masses.   Extremities: No lower extremity edema noted bilaterally.   Neuro: Alert and oriented x3, CN grossly intact, steady gait  Skin: No rashes, petechiae. Diffuse ring shaped bruising on bilateral arms. Scattered (< 5 ) pea sized colorless lumps under her old bruises.      Wt Readings from Last 4 Encounters:   09/14/23 49.6 kg (109 lb 4.8 oz)   08/29/23 48.9 kg (107 lb 12.8 oz)   08/08/23 47.9 kg (105 lb 9.6 oz)   05/24/23 48.2 kg (106 lb 3.2 oz)           Labs:     I personally reviewed the following labs:    Most Recent 3 CBC's:  Recent Labs   Lab Test 09/14/23  1252 04/26/23  1422 04/17/23  0834   WBC 5.4 8.5 6.5   HGB 14.9 10.1* 9.9*   * 112* 109*   * 197 115*     Most Recent 3 BMP's:  Recent Labs   Lab Test 05/31/23  1047 05/24/23  1344 05/10/23  1142    138 139   POTASSIUM 4.1 4.1 3.7   CHLORIDE 98 96* 97*   CO2 26 29 28   BUN 23.9* 29.6* 20.4   CR 0.89 0.93 0.92   ANIONGAP 14 13 14   VIKTORIYA 9.7 10.1 10.1   * 100* 91     Most Recent 2 LFT's:  Recent Labs   Lab Test 04/17/23  0834 04/16/23  0639   AST 32 32   ALT 24 24   ALKPHOS 115 99   BILITOTAL 1.5* 1.3*           Imaging: n/a      Impression/plan:     62 year old woman with history of rhematoid arthritis and history of stage 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 She continues in CR. No signs of disease recurrence or progression.     # DLBCL  s/p 1 REPOCH and 5 RCHOP. Now in complete remission. Last chemo 12/2017. No evidence of relapse by history or physical exam.  We will see her next year with labs and physical exam.  - Survivorship follow up, see her 1 year with labs    # Thrombocytopenia  Chronic. Fluctuates. Increased to 124 today. No  bleeding, easy bruising.     # Rheumatoid arthritis  She follows with rheumatology. Continues on pred and plaquinel    # A Fib with pacemaker  # Tricuspid Valve repair  Follows with cardiology. Is on optimal medical therapy. Has been trying to lose weight and increase exercise. Has been through cardiac rehab.    Tricuspid valve April 2023, completed cardiac rehab    # Neuropathy  Continues on gabapentin. Stable. No worsening but no significant improvement either            Zuleima Lin ACNP-BC    40 minutes spent on the date of the encounter doing chart review, review of test results, interpretation of tests, patient visit and documentation

## 2023-09-14 NOTE — LETTER
9/14/2023         RE: Amelia Michel  [address]  [place] [address]        Dear Colleague,    Thank you for referring your patient, Amelia Michel, to the [facility]. Please see a copy of my visit note below.    [facility]  Date of visit: Sep 14, 2023      Reason for Visit: follow up DLBCL      Oncology HPI:   1. Complicated patient with history of Rheumatoid Arthritis status post long term treatment with methotrexate with significant complicated course with cardiac arrest, admission with hypotension, sepsis, ICU stay and intubation with subsequent additional readmission from TCU in 6/2017 for FUO with extensive work-up with eventual finding of progression cytopenias with eventual lymphoma diagnosis  2. Bone Marrow Biopsy: 7/12/2017: Highly suspicious for involvement by B cell lymphoma with foci of large atypical CD20+ cells  3. PET CT 7/19/2017: Extensive activity: Right paratracheal lesion with SUV 28, many liver lesions with SUV 15, cardiac lesion intraarterial septum, numerous bone lesions.  4. 7/21 Liver Biopsy: Consistent with Diffuse Large B Cell Lymphoma non-germinal center phenotype  -- FISH for myc, bcl2, bcl6 negative for double-hit lymphoma  5. IPI based on Age < 60 (0 points) + PS 2 (1 + point) + Stage IV Disease (1 point) + Extranodal disease > 1 site (1 point) + elevated LDH (1 point) = 4 Poor Risk Disease  6. Cycle 1 given as R-EPOCH Day 1 = 7/27/2017  -- complications of transfusion dependence and need for acute rehab placement (likely more result of extensive hospital stays)  - LP negative for CNS involvement 8/23/2017  7. Cycle 2: Transition to R-CHOP Day 1 = 8/22/2017  - Day 15 high dose MTX for CNS prophylaxis  - complicated by significant fluid overload and PICC associated DVT  8. Cycle #3 Day 1 = 9/20/2017 Post Cycle 3 PET CR. Cycle 4 10/11/2017 + IT prophylaxis, Cycle 5 11/8/17, Cycle 6 11/29/2017 + IT  "prophylaxis  -- Post Treatment completion PET 1/15/2018: Complete Remission      Interval history:   Amelia is here for follow up  She is doing well-- main complaint is her left knee and hip pain and sciatica discomfort  Underwent a minimally invasive tricuspid valve replacement in 4/10/23  Completed cardiac rehab  Left knee was diagnosed as \"bone on bone\"-- received injections. Hoping to get imaging and injection into her hip as well  Weight has increased < 2 lbs, she aims to be right at 105 lbs.   No fevers or chills.   No bleeding. She does have significant bruising (eliquis, ASA) which tends to cause small pea- size bumps. Her bruises fade into rings which is an odd pattern but consistent.    ROS otherwise neg.                   Current Outpatient Medications   Medication Sig Dispense Refill    alendronate (FOSAMAX) 70 MG tablet Take 1 tablet (70 mg) by mouth every 7 days (Patient taking differently: Take 70 mg by mouth every 7 days Mondays) 12 tablet 3    apixaban ANTICOAGULANT (ELIQUIS ANTICOAGULANT) 5 MG tablet Take 1 tablet (5 mg) by mouth 2 times daily 180 tablet 3    aspirin (ASA) 81 MG chewable tablet 1 tablet (81 mg) by Oral or NG Tube route daily 30 tablet 2    calcium carbonate 600 mg-vitamin D 400 units (CALTRATE) 600-400 MG-UNIT per tablet Take 2 tablets by mouth daily      diltiazem ER (DILT-XR) 240 MG 24 hr ER beaded capsule Take 1 capsule (240 mg) by mouth daily 90 capsule 3    empagliflozin (JARDIANCE) 10 MG TABS tablet Take 1 tablet (10 mg) by mouth daily for 360 days 90 tablet 3    gabapentin (NEURONTIN) 100 MG capsule TAKE 1 CAPSULE BY MOUTH IN THE MORNING, 1 CAPSULE BY MOUTH IN THE AFTERNOON, AND 2 CAPSULES BY MOUTH AT BEDTIME 360 capsule 0    hydroxychloroquine (PLAQUENIL) 200 MG tablet Take 1 tablet (200 mg) by mouth daily 90 tablet 2    loratadine (CLARITIN) 10 MG tablet Take 10 mg by mouth daily      magnesium oxide 200 MG TABS Take 200 mg by mouth daily bedtime      multivitamin, " therapeutic with minerals (THERA-VIT-M) TABS tablet Take 1 tablet by mouth daily 30 each 0    potassium chloride ER (KLOR-CON M) 20 MEQ CR tablet Take 3 tablets (60 mEq) by mouth daily 270 tablet 3    predniSONE (DELTASONE) 1 MG tablet Take 3 tablets (3 mg) by mouth daily - Oral 270 tablet 1    spironolactone (ALDACTONE) 25 MG tablet Take 0.5 tablets (12.5 mg) by mouth daily 30 tablet 3    Vitamin D (Cholecalciferol) 10 MCG (400 UNIT) TABS Take 1 tablet by mouth daily      acetaminophen (TYLENOL) 325 MG tablet Take 2 tablets (650 mg) by mouth every 4 hours as needed for other (For optimal non-opioid multimodal pain management to improve pain control.) 100 tablet 1    dexamethasone (DECADRON) 4 MG/ML injection Inject 4 mg IM for adrenal crisis 2 mL 3    docusate sodium (COLACE) 100 MG capsule Take 100 mg by mouth 2 times daily as needed for constipation      metolazone (ZAROXOLYN) 2.5 MG tablet Take 1 tablet (2.5 mg) by mouth as needed (ONLY take if instructed to do so by your cardiology team.) 5 tablet 0    predniSONE (DELTASONE) 5 MG tablet 10 mg when sick for 3 days or until back to baseline for secondary adrenal insufficiency 30 tablet 3       Allergies   Allergen Reactions    Amiodarone Other (See Comments) and Difficulty breathing     Lethargic and had trouble breathing- occurred in 2017    Blood Transfusion Related (Informational Only) Hives     Hives with platelets. Give benadryl premedication.    Metoprolol Other (See Comments)     Pt and  report that metoprolol does not work for her and also reports feeling unwell with this medication. She has been able to tolerate atenolol, which as worked in controlling her HR.     Other [No Clinical Screening - See Comments]      Penicillin Allergy Skin Test not performed, see antimicrobial management team progress note 7/5/17.      Penicillins      Childhood reaction, concern for tongue swelling    Tape [Adhesive Tape] Rash         Physical Exam:  /78    Pulse 94   Temp 98.5  F (36.9  C) (Oral)   Resp 16   Wt 49.6 kg (109 lb 4.8 oz)   SpO2 100%   BMI 22.63 kg/m    General: No acute distress.  HEENT: EOMI, PERRL. Sclerae are anicteric. Oral mucosa is pink and moist with no lesions or thrush.   Lymph: Neck is supple with no lymphadenopathy in the cervical or supraclavicular areas.   Heart: Regular rate and rhythm.   Lungs: Clear to auscultation bilaterally. Pacemaker protruding left chest wall  Abdomen: Bowel sounds present, soft, nontender with no palpable hepatosplenomegaly or masses.   Extremities: No lower extremity edema noted bilaterally.   Neuro: Alert and oriented x3, CN grossly intact, steady gait  Skin: No rashes, petechiae. Diffuse ring shaped bruising on bilateral arms. Scattered (< 5 ) pea sized colorless lumps under her old bruises.      Wt Readings from Last 4 Encounters:   09/14/23 49.6 kg (109 lb 4.8 oz)   08/29/23 48.9 kg (107 lb 12.8 oz)   08/08/23 47.9 kg (105 lb 9.6 oz)   05/24/23 48.2 kg (106 lb 3.2 oz)           Labs:     I personally reviewed the following labs:    Most Recent 3 CBC's:  Recent Labs   Lab Test 09/14/23  1252 04/26/23  1422 04/17/23  0834   WBC 5.4 8.5 6.5   HGB 14.9 10.1* 9.9*   * 112* 109*   * 197 115*     Most Recent 3 BMP's:  Recent Labs   Lab Test 05/31/23  1047 05/24/23  1344 05/10/23  1142    138 139   POTASSIUM 4.1 4.1 3.7   CHLORIDE 98 96* 97*   CO2 26 29 28   BUN 23.9* 29.6* 20.4   CR 0.89 0.93 0.92   ANIONGAP 14 13 14   VIKTORIYA 9.7 10.1 10.1   * 100* 91     Most Recent 2 LFT's:  Recent Labs   Lab Test 04/17/23  0834 04/16/23  0639   AST 32 32   ALT 24 24   ALKPHOS 115 99   BILITOTAL 1.5* 1.3*           Imaging: n/a      Impression/plan:     62 year old woman with history of rhematoid arthritis and history of stage 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 She continues in CR. No signs of disease recurrence or progression.     # DLBCL  s/p 1 REPOCH and 5 RCHOP. Now in  complete remission. Last chemo 12/2017. No evidence of relapse by history or physical exam.  We will see her next year with labs and physical exam.  - Survivorship follow up, see her 1 year with labs    # Thrombocytopenia  Chronic. Fluctuates. Increased to 124 today. No bleeding, easy bruising.     # Rheumatoid arthritis  She follows with rheumatology. Continues on pred and plaquinel    # A Fib with pacemaker  # Tricuspid Valve repair  Follows with cardiology. Is on optimal medical therapy. Has been trying to lose weight and increase exercise. Has been through cardiac rehab.    Tricuspid valve April 2023, completed cardiac rehab    # Neuropathy  Continues on gabapentin. Stable. No worsening but no significant improvement either            Zuleima Lin ACNP-BC    40 minutes spent on the date of the encounter doing chart review, review of test results, interpretation of tests, patient visit and documentation

## 2023-09-18 LAB
DEPRECATED CALCIDIOL+CALCIFEROL SERPL-MC: <87 UG/L (ref 20–75)
VITAMIN D2 SERPL-MCNC: <5 UG/L
VITAMIN D3 SERPL-MCNC: 82 UG/L

## 2023-09-20 ENCOUNTER — OFFICE VISIT (OUTPATIENT)
Dept: CARDIOLOGY | Facility: CLINIC | Age: 63
End: 2023-09-20
Attending: NURSE PRACTITIONER
Payer: COMMERCIAL

## 2023-09-20 VITALS
WEIGHT: 109.7 LBS | HEART RATE: 107 BPM | DIASTOLIC BLOOD PRESSURE: 75 MMHG | SYSTOLIC BLOOD PRESSURE: 113 MMHG | OXYGEN SATURATION: 95 % | BODY MASS INDEX: 22.72 KG/M2

## 2023-09-20 DIAGNOSIS — I50.30 HEART FAILURE WITH PRESERVED EJECTION FRACTION, NYHA CLASS I (H): ICD-10-CM

## 2023-09-20 PROCEDURE — G0463 HOSPITAL OUTPT CLINIC VISIT: HCPCS | Performed by: NURSE PRACTITIONER

## 2023-09-20 PROCEDURE — 99214 OFFICE O/P EST MOD 30 MIN: CPT | Performed by: NURSE PRACTITIONER

## 2023-09-20 ASSESSMENT — PAIN SCALES - GENERAL: PAINLEVEL: NO PAIN (0)

## 2023-09-20 NOTE — PATIENT INSTRUCTIONS
CORE: Cardiomyopathy, Optimization, Rehabilitation, Education      Take your medicines every day, as directed    Changes/Recommendations made today:  No medication changes  Plan for labs (BMP) in 3 months    Monitor Your Weight and Symptoms    Contact us if you:    Gain 2 pounds in one day or 5 pounds in one week  Feel more short of breath  Notice more leg swelling  Feel lightheadeded   Change your lifestyle    Limit Salt or Sodium:  2000 mg  Limit Fluids:  2000 mL or approximately 64 ounces  Eat a Heart Healthy Diet  Low in saturated fats  Stay Active:  Aim to move at least 150 minutes every  week         To Contact us    During Business Hours:  [phone], option # 1      After hours, weekends or holidays:   [phone], Option #4  Ask to speak to the On-Call Cardiologist. Inform them you are a CORE/heart failure patient at the [facility].     Use [employer] allows you to communicate directly with your heart team through secure messaging.  [employer] can be accessed any time on your phone, computer, or tablet.  If you need assistance, we'd be happy to help!         Keep your Heart Appointments:    CORE in 6 months, sooner if needed

## 2023-09-20 NOTE — NURSING NOTE
Chief Complaint   Patient presents with    Follow Up     Return Core     Vitals were taken and medications reconciled.    Wiley Pepper, EMT  12:41 PM

## 2023-09-20 NOTE — LETTER
9/20/2023      RE: Amelia Michel  [address]  [facility] [address]       Dear Colleague,    Thank you for the opportunity to participate in the care of your patient, Amelia Michel, at the [facility] [place] at [facility]. Please see a copy of my visit note below.      [facility]   [facility]      HPI:   Ms. 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 She was recently admitted 11/19/-12/5/22 with ADHF and frequent NSVT, now s/p VT ablation. She presents to [facility] for hospital follow up.     With regards to her cardiac conditions, as noted, Ms. Michel presented to [facility] on 11/19 with MONTALVO, BLE edema, and 8 pound weight gain over the course of one week. Chest X-ray was suspicious for pulmonary edema, NT-P BNP 1,913, Troponin T 21-->12. EDW per chart review 116-120; admission weight 128. She was admitted w/ decompensated diastolic heart failure with pictutre c/b frequent non sustained ventricular tachycardia with RHC on 11/28/22 notable for cardiogenic shock with a cardiac index of 1.2 and CO of 1.6. Etiology thought to most likely secondary to frequent non sustained ventricular tachycardia in the setting of possible restrictive/constrictive physiology given her malignancy history (albeit presumably in remission). CAD ruled on by coronary angiogram completed on 11/28/22. She underwent VT ablation on 12/1/22 and had successful resumption of her home medications with increased diuretic dosing and adddition of an SGLT2 inhibitor. She was diuresed  19 lbs to a weight of 109 lbs at time of discharge.     At CORE visit on 1/9/23 Ms. Michel was hypervolemic as  evident by weight gain, tachycardia, and peripheral edema. Review of labs demonstrated persistent hyponatremia with Na 127 and bump in Cr to 1.35. Lasix was increase 60 mg BID. Still awaiting for PA for cardiomems device. Admitted 1/24-1/29 for CHF exacerbation. Diuresed and switched to torsemide 40 mg BID. Discharged at a weight of 107 lb.     Device interrogation from 2/7 with rates 120s-140, AFL. Diltiazem increased to 240 mg daily. Since our last CORE visit in 2/2023 she has resumed torsemide 20 mg BID and spironolactone 12.5 mg daily. She was evaluated by Dr. Cope for severe TR. Admitted 4/10-4/17/23 for elective TVR with Dr. Cope.     At last CORE visit in 5/2023, Ms. Michel was feeling well. Weights are very stable at 103-104 lbs. Echo at that time showed preserved LVEF and mildly improved RV function. Diltiazem was increased to 180 mg daily for elevated heart rates. Started on spironolactone.     Today, Ms. Michel reports feeling well. She admits to ~ 5 lb weight gain over the course of the summer, but thinks this is due to calories versus fluid. She is without peripheral edema, worsening dyspnea on exertion, orthopnea, or PND. Has some MONTALVO but feels it proportional to the activity. Denies chest pain, SOB, palpitations, lightheadedness, syncope, presyncope. Remains on eliquis for CVA prophylaxis and ASA 81 mg with new valve. Increased bruising since starting on ASA. Reports that HRs in cardiac rehab are ranging 100-150 bmp, asymptomatic to increased rates.      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    VSD (ventricular septal defect)        FAMILY HISTORY:  Family History   Problem Relation Age of Onset    Breast Cancer Mother         60s    Hypertension Mother     Alzheimer Disease Mother     Cerebrovascular Disease Mother     Hypertension Father     Cerebrovascular Disease Father     Atrial fibrillation Father     Lymphoma Father     Prostate Cancer Father     Alzheimer Disease Maternal Grandmother         likely    Arthritis Maternal Grandfather     Pneumonia Maternal Grandfather     Diabetes Paternal Grandmother        SOCIAL HISTORY:  Social History     Socioeconomic History    Marital status:      Spouse name: Romeo Michel    Number of children: 0   Occupational History     Employer: [employer]   Tobacco Use    Smoking status: Never    Smokeless tobacco: Never   Substance and Sexual Activity    Alcohol use: Not Currently     Comment: none    Drug use: No   Other Topics Concern    Parent/sibling w/ CABG, MI or angioplasty before 65F 55M? No     Social Determinants of Health     Intimate Partner Violence: Not At Risk    Fear of Current or Ex-Partner: No    Emotionally Abused: No    Physically Abused: No    Sexually Abused: No       CURRENT MEDICATIONS:  acetaminophen (TYLENOL) 325 MG tablet, Take 2 tablets (650 mg) by mouth every 4 hours as needed for other (For optimal non-opioid multimodal pain management to improve pain control.)  alendronate (FOSAMAX) 70 MG tablet, Take 1 tablet (70 mg) by mouth every 7 days (Patient taking differently: Take 70 mg by mouth every 7 days Mondays)  apixaban ANTICOAGULANT (ELIQUIS ANTICOAGULANT) 5 MG tablet, Take 1 tablet (5 mg) by mouth 2 times daily  aspirin (ASA) 81 MG chewable tablet, 1 tablet  (81 mg) by Oral or NG Tube route daily  calcium carbonate 600 mg-vitamin D 400 units (CALTRATE) 600-400 MG-UNIT per tablet, Take 2 tablets by mouth daily  dexamethasone (DECADRON) 4 MG/ML injection, Inject 4 mg IM for adrenal crisis  diltiazem ER (DILT-XR) 240 MG 24 hr ER beaded capsule, Take 1 capsule (240 mg) by mouth daily  docusate sodium (COLACE) 100 MG capsule, Take 100 mg by mouth 2 times daily as needed for constipation  empagliflozin (JARDIANCE) 10 MG TABS tablet, Take 1 tablet (10 mg) by mouth daily for 360 days  gabapentin (NEURONTIN) 100 MG capsule, TAKE 1 CAPSULE BY MOUTH IN THE MORNING, 1 CAPSULE BY MOUTH IN THE AFTERNOON, AND 2 CAPSULES BY MOUTH AT BEDTIME  hydroxychloroquine (PLAQUENIL) 200 MG tablet, Take 1 tablet (200 mg) by mouth daily  loratadine (CLARITIN) 10 MG tablet, Take 10 mg by mouth daily  magnesium oxide 200 MG TABS, Take 200 mg by mouth daily bedtime  metolazone (ZAROXOLYN) 2.5 MG tablet, Take 1 tablet (2.5 mg) by mouth as needed (ONLY take if instructed to do so by your cardiology team.)  multivitamin, therapeutic with minerals (THERA-VIT-M) TABS tablet, Take 1 tablet by mouth daily  potassium chloride ER (KLOR-CON M) 20 MEQ CR tablet, Take 3 tablets (60 mEq) by mouth daily  predniSONE (DELTASONE) 1 MG tablet, Take 3 tablets (3 mg) by mouth daily - Oral  predniSONE (DELTASONE) 5 MG tablet, 10 mg when sick for 3 days or until back to baseline for secondary adrenal insufficiency  spironolactone (ALDACTONE) 25 MG tablet, Take 0.5 tablets (12.5 mg) by mouth daily  Vitamin D (Cholecalciferol) 10 MCG (400 UNIT) TABS, Take 1 tablet by mouth daily    No current facility-administered medications on file prior to visit.      ROS:   Refer to HPI    EXAM:  /75 (BP Location: Right arm, Patient Position: Chair, Cuff Size: Adult Regular)   Pulse 107   Wt 49.8 kg (109 lb 11.2 oz)   SpO2 95%   BMI 22.72 kg/m       GENERAL: Appears comfortable, in no acute distress.   HEENT: Eye symmetrical,  no discharge or icterus bilaterally. Mucous membranes moist and without lesions.  CV: tachycardic, irregular, +S1S2, systolic murmur, no rub, or gallop. JVP at clavicle    RESPIRATORY: Respirations regular, even, and unlabored. Lungs CTA throughout.   GI: Soft and non distended with normoactive bowel sounds present in all quadrants. No tenderness, rebound, guarding. No hepatomegaly.   EXTREMITIES: No peripheral edema. 2+ bilateral pedal pulses.   NEUROLOGIC: Alert and oriented x 3. No focal deficits.   MUSCULOSKELETAL: No joint swelling or tenderness.   SKIN: No jaundice. No rashes or lesions.     Labs, reviewed with patient in clinic today:  CBC RESULTS:  Lab Results   Component Value Date    WBC 5.4 09/14/2023    WBC Canceled, Test credited 03/16/2021    RBC 4.16 09/14/2023    RBC Canceled, Test credited 03/16/2021    HGB 14.9 09/14/2023    HGB Canceled, Test credited 03/16/2021    HCT 43.1 09/14/2023    HCT Canceled, Test credited 03/16/2021     (H) 09/14/2023    MCV Canceled, Test credited 03/16/2021    MCH 35.8 (H) 09/14/2023    MCH Canceled, Test credited 03/16/2021    MCHC 34.6 09/14/2023    MCHC Canceled, Test credited 03/16/2021    RDW 12.8 09/14/2023    RDW Canceled, Test credited 03/16/2021     (L) 09/14/2023    PLT Canceled, Test credited 03/16/2021       CMP RESULTS:  Lab Results   Component Value Date     (L) 09/14/2023     (L) 11/29/2022     06/23/2021    POTASSIUM 4.5 09/14/2023    POTASSIUM 3.5 04/10/2023    POTASSIUM 3.9 07/20/2022    POTASSIUM 4.2 06/23/2021    CHLORIDE 93 (L) 09/14/2023    CHLORIDE 101 07/20/2022    CHLORIDE 102 06/23/2021    CO2 30 (H) 09/14/2023    CO2 28 07/20/2022    CO2 28 06/23/2021    ANIONGAP 10 09/14/2023    ANIONGAP 7 07/20/2022    ANIONGAP 6 06/23/2021     (H) 09/14/2023     (H) 09/14/2023     (H) 04/17/2023     (H) 07/20/2022    GLC 98 06/23/2021    BUN 31.5 (H) 09/14/2023    BUN 31 (H) 07/20/2022    BUN 25  06/23/2021    CR 1.07 09/14/2023    CR 1.05 (H) 06/23/2021    GFRESTIMATED 58 (L) 09/14/2023    GFRESTIMATED 57 (L) 06/23/2021    GFRESTBLACK 67 06/23/2021    VIKTORIYA 10.2 09/14/2023    VIKTORIYA 10.2 09/14/2023    VIKTORIYA 10.0 06/23/2021    BILITOTAL 0.9 09/14/2023    BILITOTAL 1.2 06/23/2021    ALBUMIN 4.9 09/14/2023    ALBUMIN 4.4 04/20/2022    ALBUMIN 4.2 06/23/2021    ALKPHOS 116 09/14/2023    ALKPHOS 136 06/23/2021    ALT 22 09/14/2023    ALT 41 06/23/2021    AST 38 09/14/2023    AST 36 06/23/2021        INR RESULTS:  Lab Results   Component Value Date    INR 1.71 (H) 04/10/2023    INR 3.42 (H) 03/07/2018       Lab Results   Component Value Date    MAG 2.0 04/16/2023    MAG 1.9 04/12/2018     Lab Results   Component Value Date    NTBNPI 2,067 (H) 01/24/2023    NTBNPI 485 12/12/2019     Lab Results   Component Value Date    NTBNP 1,425 (H) 10/13/2021    NTBNP 1,274 (H) 05/26/2021       Cardiac Diagnostics:    5/24/2023 Echo   Interpretation Summary  Tricuspid valve replacement with 29mm [employer] Silvio Epic.  Doppler interrogation of the tricspid valve is normal.  Global and regional left ventricular function is normal with an EF of 55-60%.  The right ventricle is normal size. Global right ventricular function is  mildly to moderately reduced.  Severe biatrial enlargement is present.  Mild to moderate mitral insufficiency is present.  IVC diameter >2.1 cm collapsing <50% with sniff suggests a high RA pressure  estimated at 15 mmHg or greater.  No pericardial effusion is present.  Compared to prior, RV appears slightly better, CVP is higher now.    4/17/2023 Echo   Interpretation Summary  Minimally invasive tricuspid valve replacement with 29mm [employer] Silvio Epic. Doppler  interrogation of the tricspid valve is normal. TV mean gradient 4-6 mmHg.  Moderate right ventricular dilation is present. Global right ventricular  function is mildly to moderately reduced.  Global and regional left ventricular function is normal with an EF of  60-65%.  Mild to moderate mitral insufficiency is present.  IVC diameter <2.1 cm collapsing >50% with sniff suggests a normal RA pressure  of 3 mmHg.  No pericardial effusion is present.  Compared to prior TVR is new.    2/7/2023 Device Interrogation   Device: [employer]outlettronic [id number] [place] XT DR GALLEGOS  Normal Device Function.  Mode: VVIR  bpm  : 8.6%  Presenting EGM: Narrow Complex tachycardia @ 139 bpm  Short V-V intervals: 0  Lead Trends Appear Stable.  Estimated battery longevity to RRT = 11 years. Battery voltage = 3.02 V.  Atrial Arrhythmia: AFL  Anticoagulant: Apixaban  Ventricular Arrhythmia: 4 episodes lasting 1-4 sec 194-245 bpm  Transmission discussed with Maria Esther Cutler NP. Orders to increase diltiazem from 180 mg daily to 240 mg daily.    12/5/2022 ECHO  Interpretation Summary  Severe tricuspid insufficiency is present.  Moderate right ventricular dilation is present. Global right ventricular  function is mildly reduced.  Global and regional left ventricular function is normal with an EF of 55-60%.  IVC diameter >2.1 cm collapsing <50% with sniff suggests a high RA pressure  estimated at 15 mmHg or greater.  No pericardial effusion is present.  No significant changes noted.    11/28/2022 RHC with coronary angiogram   Right sided filling pressures are severely elevated.  Mild elevated pulmonary hypertension.  Left sided filling pressures are moderately elevated.  Reduced cardiac output level.  Hemodynamic data has been modified in Epic per physician review.  Prox LAD lesion is 30% stenosed.     Mild non-obstructive coronary artery disease        Assessment and Plan:   Ms. Michel is a 62 year old female with medical history pertinent for HFpEF, mildly dilated and reduced RV function, severe TR (2/2 failure of leaflet coaptation) s/p TVR 4/10/23, atrial flutter/fibrillation (on apixaban and rate control strategy), cardiac arrest (2017 likely 2/2 malignant involvement of the pericardium c/b organizing  pericardial effusion), SSS s/p ppm (2019), VSD (s/p repair 1969), and DLBCL (s/p CHOP therapy and in remission). She was recently admitted 11/19/-12/5/22 with ADHF and frequent NSVT, now s/p VT ablation. She presents to [facility] for hospital follow up.     Overall, appearing well. Weights are stable, euvolemic by exam. Labs from last week are stable. Cr 1.07, lytes wnl. We will plan to continue her current medications, no changes. Amelia is very mindful of her sodium intake. She wears compression stockings and monitors her weights daily. She understands to call with any weight gain or changes in symptoms. Repeat BMP in 3 months.       # Acute on chronic diastolic heart failure with dilated and mildly reduced RV function (LVEF 55-60%)  Pre-Ablation: 11/28/22 RHC: CVP 24, PA 48/27/34, PCWP 23, SVO2 46, CI 1.1, CO 1.6  Post Ablation and Diuresis: 12/3/22 RHC: CVP 16, PA 41/22/28, PCWP 15, SVO2 69, CI 2.1, CO 3.0    NYHA Class III, AHA/ACC Stage C  Rate control: 107, continue diltiazem   volume status: euvolemic, continue torsemide 20 mg daily    Blood pressure control:  Diltiazem 240 mg daily   Aldosterone antagonist: Aldactone 12.5 mg daily   SGLT2:  Empagliflozin 10 mg daily   Evaluation of coronary arteries: 11/28/22 angiogram mild non-obstructive CAD     # Frequent, non-sustained ventricular tachycardia s/p VT Ablation (by Dr. Eaton on 12/1/22)  - Anti-arrhythmic: none, s/p VT ablation  - Stopped PTA digoxin per EP recommendations  - Anticoagulation: given history of Afib, continue Apixaban 5 mg bid  - Follow up with EP specialists (Dr. Eaton and/or Maria Esther Cutler NP)    # HTN Intolerant to Metoprolol in the past.   - Diltiazem 240 mg daily, spironolactone 12.5 mg daily       # Aflutter. History of SSS s/p PPM 12/19. Long standing history of atrial arrythmias.  - Diltiazem 240 mg daily  - Continue Eliquis.   - Follow up with EP as scheduled.      # VSD s/p repair.     # History of exudative pericardial effusion.   -  Stable per CT 3/19.     # Severe TR per TTE 11/2022 s/p TVR on 4/10/23  - continue ASA 81 mg daily         Follow up:  - CORE 6 months          Elmira Vasquez DNP, NP-C  Advance Heart Failure  9/20/203

## 2023-09-20 NOTE — PROGRESS NOTES
[facility] Cardiology   CORE Clinic      HPI:   Ms. 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 She was recently admitted 11/19/-12/5/22 with ADHF and frequent NSVT, now s/p VT ablation. She [place] [facility] [place].     With regards to her cardiac conditions, as noted, Ms. Michel presented to [facility] on 11/19 with MONTALVO, BLE edema, and 8 pound weight gain over the course of one week. Chest X-ray was suspicious for pulmonary edema, NT-P BNP 1,913, Troponin T 21-->12. EDW per chart review 116-120; admission weight 128. She was admitted w/ decompensated diastolic heart failure with pictutre c/b frequent non sustained ventricular tachycardia with RHC on 11/28/22 notable for cardiogenic shock with a cardiac index of 1.2 and CO of 1.6. Etiology thought to most likely secondary to frequent non sustained ventricular tachycardia in the setting of possible restrictive/constrictive physiology given her malignancy history (albeit presumably in remission). CAD ruled on by coronary angiogram completed on 11/28/22. She underwent VT ablation on 12/1/22 and had successful resumption of her home medications with increased diuretic dosing and adddition of an SGLT2 inhibitor. She was diuresed 19 lbs to a weight of 109 lbs at time of discharge.     At CORE visit on 1/9/23 Ms. Michel was hypervolemic as  evident by weight gain, tachycardia, and peripheral edema. Review of labs demonstrated persistent hyponatremia with Na 127 and bump in Cr to 1.35. Lasix was increase 60 mg BID. Still awaiting for PA for cardiomems device. Admitted  1/24-1/29 for CHF exacerbation. Diuresed and switched to torsemide 40 mg BID. Discharged at a weight of 107 lb.     Device interrogation from 2/7 with rates 120s-140, AFL. Diltiazem increased to 240 mg daily. Since our last CORE visit in 2/2023 she has resumed torsemide 20 mg BID and spironolactone 12.5 mg daily. She was evaluated by Dr. Cope for severe TR. Admitted 4/10-4/17/23 for elective TVR with Dr. Cope.     At last CORE visit in 5/2023, Ms. Michel was feeling well. Weights are very stable at 103-104 lbs. Echo at that time showed preserved LVEF and mildly improved RV function. Diltiazem was increased to 180 mg daily for elevated heart rates. Started on spironolactone.     Today, Ms. Michel reports feeling well. She admits to ~ 5 lb weight gain over the course of the summer, but thinks this is due to calories versus fluid. She is without peripheral edema, worsening dyspnea on exertion, orthopnea, or PND. Has some MONTALVO but feels it proportional to the activity. Denies chest pain, SOB, palpitations, lightheadedness, syncope, presyncope. Remains on eliquis for CVA prophylaxis and ASA 81 mg with new valve. Increased bruising since starting on ASA. Reports that HRs in cardiac rehab are ranging 100-150 bmp, asymptomatic to increased rates.      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    VSD (ventricular septal defect)        FAMILY HISTORY:  Family History   Problem Relation  Age of Onset    Breast Cancer Mother         60s    Hypertension Mother     Alzheimer Disease Mother     Cerebrovascular Disease Mother     Hypertension Father     Cerebrovascular Disease Father     Atrial fibrillation Father     Lymphoma Father     Prostate Cancer Father     Alzheimer Disease Maternal Grandmother         likely    Arthritis Maternal Grandfather     Pneumonia Maternal Grandfather     Diabetes Paternal Grandmother        SOCIAL HISTORY:  Social History     Socioeconomic History    Marital status:      Spouse name: Romeo Michel    Number of children: 0   Occupational History     Employer: [employer]   Tobacco Use    Smoking status: Never    Smokeless tobacco: Never   Substance and Sexual Activity    Alcohol use: Not Currently     Comment: none    Drug use: No   Other Topics Concern    Parent/sibling w/ CABG, MI or angioplasty before 65F 55M? No     Social Determinants of Health     Intimate Partner Violence: Not At Risk    Fear of Current or Ex-Partner: No    Emotionally Abused: No    Physically Abused: No    Sexually Abused: No       CURRENT MEDICATIONS:  acetaminophen (TYLENOL) 325 MG tablet, Take 2 tablets (650 mg) by mouth every 4 hours as needed for other (For optimal non-opioid multimodal pain management to improve pain control.)  alendronate (FOSAMAX) 70 MG tablet, Take 1 tablet (70 mg) by mouth every 7 days (Patient taking differently: Take 70 mg by mouth every 7 days Mondays)  apixaban ANTICOAGULANT (ELIQUIS ANTICOAGULANT) 5 MG tablet, Take 1 tablet (5 mg) by mouth 2 times daily  aspirin (ASA) 81 MG chewable tablet, 1 tablet (81 mg) by Oral or NG Tube route daily  calcium carbonate 600 mg-vitamin D 400 units (CALTRATE) 600-400 MG-UNIT per tablet, Take 2 tablets by mouth daily  dexamethasone (DECADRON) 4 MG/ML injection, Inject 4 mg IM for adrenal crisis  diltiazem ER (DILT-XR) 240 MG 24 hr ER beaded capsule, Take 1 capsule (240 mg) by mouth daily  docusate  sodium (COLACE) 100 MG capsule, Take 100 mg by mouth 2 times daily as needed for constipation  empagliflozin (JARDIANCE) 10 MG TABS tablet, Take 1 tablet (10 mg) by mouth daily for 360 days  gabapentin (NEURONTIN) 100 MG capsule, TAKE 1 CAPSULE BY MOUTH IN THE MORNING, 1 CAPSULE BY MOUTH IN THE AFTERNOON, AND 2 CAPSULES BY MOUTH AT BEDTIME  hydroxychloroquine (PLAQUENIL) 200 MG tablet, Take 1 tablet (200 mg) by mouth daily  loratadine (CLARITIN) 10 MG tablet, Take 10 mg by mouth daily  magnesium oxide 200 MG TABS, Take 200 mg by mouth daily bedtime  metolazone (ZAROXOLYN) 2.5 MG tablet, Take 1 tablet (2.5 mg) by mouth as needed (ONLY take if instructed to do so by your cardiology team.)  multivitamin, therapeutic with minerals (THERA-VIT-M) TABS tablet, Take 1 tablet by mouth daily  potassium chloride ER (KLOR-CON M) 20 MEQ CR tablet, Take 3 tablets (60 mEq) by mouth daily  predniSONE (DELTASONE) 1 MG tablet, Take 3 tablets (3 mg) by mouth daily - Oral  predniSONE (DELTASONE) 5 MG tablet, 10 mg when sick for 3 days or until back to baseline for secondary adrenal insufficiency  spironolactone (ALDACTONE) 25 MG tablet, Take 0.5 tablets (12.5 mg) by mouth daily  Vitamin D (Cholecalciferol) 10 MCG (400 UNIT) TABS, Take 1 tablet by mouth daily    No current facility-administered medications on file prior to visit.      ROS:   Refer to HPI    EXAM:  /75 (BP Location: Right arm, Patient Position: Chair, Cuff Size: Adult Regular)   Pulse 107   Wt 49.8 kg (109 lb 11.2 oz)   SpO2 95%   BMI 22.72 kg/m       GENERAL: Appears comfortable, in no acute distress.   HEENT: Eye symmetrical, no discharge or icterus bilaterally. Mucous membranes moist and without lesions.  CV: tachycardic, irregular, +S1S2, systolic murmur, no rub, or gallop. JVP at clavicle    RESPIRATORY: Respirations regular, even, and unlabored. Lungs CTA throughout.   GI: Soft and non distended with normoactive bowel sounds present in all quadrants. No  tenderness, rebound, guarding. No hepatomegaly.   EXTREMITIES: No peripheral edema. 2+ bilateral pedal pulses.   NEUROLOGIC: Alert and oriented x 3. No focal deficits.   MUSCULOSKELETAL: No joint swelling or tenderness.   SKIN: No jaundice. No rashes or lesions.     Labs, reviewed with patient in clinic today:  CBC RESULTS:  Lab Results   Component Value Date    WBC 5.4 09/14/2023    WBC Canceled, Test credited 03/16/2021    RBC 4.16 09/14/2023    RBC Canceled, Test credited 03/16/2021    HGB 14.9 09/14/2023    HGB Canceled, Test credited 03/16/2021    HCT 43.1 09/14/2023    HCT Canceled, Test credited 03/16/2021     (H) 09/14/2023    MCV Canceled, Test credited 03/16/2021    MCH 35.8 (H) 09/14/2023    MCH Canceled, Test credited 03/16/2021    MCHC 34.6 09/14/2023    MCHC Canceled, Test credited 03/16/2021    RDW 12.8 09/14/2023    RDW Canceled, Test credited 03/16/2021     (L) 09/14/2023    PLT Canceled, Test credited 03/16/2021       CMP RESULTS:  Lab Results   Component Value Date     (L) 09/14/2023     (L) 11/29/2022     06/23/2021    POTASSIUM 4.5 09/14/2023    POTASSIUM 3.5 04/10/2023    POTASSIUM 3.9 07/20/2022    POTASSIUM 4.2 06/23/2021    CHLORIDE 93 (L) 09/14/2023    CHLORIDE 101 07/20/2022    CHLORIDE 102 06/23/2021    CO2 30 (H) 09/14/2023    CO2 28 07/20/2022    CO2 28 06/23/2021    ANIONGAP 10 09/14/2023    ANIONGAP 7 07/20/2022    ANIONGAP 6 06/23/2021     (H) 09/14/2023     (H) 09/14/2023     (H) 04/17/2023     (H) 07/20/2022    GLC 98 06/23/2021    BUN 31.5 (H) 09/14/2023    BUN 31 (H) 07/20/2022    BUN 25 06/23/2021    CR 1.07 09/14/2023    CR 1.05 (H) 06/23/2021    GFRESTIMATED 58 (L) 09/14/2023    GFRESTIMATED 57 (L) 06/23/2021    GFRESTBLACK 67 06/23/2021    VIKTORIYA 10.2 09/14/2023    VIKTORIYA 10.2 09/14/2023    VIKTORIYA 10.0 06/23/2021    BILITOTAL 0.9 09/14/2023    BILITOTAL 1.2 06/23/2021    ALBUMIN 4.9 09/14/2023    ALBUMIN 4.4 04/20/2022     ALBUMIN 4.2 06/23/2021    ALKPHOS 116 09/14/2023    ALKPHOS 136 06/23/2021    ALT 22 09/14/2023    ALT 41 06/23/2021    AST 38 09/14/2023    AST 36 06/23/2021        INR RESULTS:  Lab Results   Component Value Date    INR 1.71 (H) 04/10/2023    INR 3.42 (H) 03/07/2018       Lab Results   Component Value Date    MAG 2.0 04/16/2023    MAG 1.9 04/12/2018     Lab Results   Component Value Date    NTBNPI 2,067 (H) 01/24/2023    NTBNPI 485 12/12/2019     Lab Results   Component Value Date    NTBNP 1,425 (H) 10/13/2021    NTBNP 1,274 (H) 05/26/2021       Cardiac Diagnostics:    5/24/2023 Echo   Interpretation Summary  Tricuspid valve replacement with 29mm [employer] Sil[place]io Epic.  Doppler interrogation of the tricspid valve is normal.  Global and regional left ventricular function is normal with an EF of 55-60%.  The right ventricle is normal size. Global right ventricular function is  mildly to moderately reduced.  Severe biatrial enlargement is present.  Mild to moderate mitral insufficiency is present.  IVC diameter >2.1 cm collapsing <50% with sniff suggests a high RA pressure  estimated at 15 mmHg or greater.  No pericardial effusion is present.  Compared to prior, RV appears slightly better, CVP is higher now.    4/17/2023 Echo   Interpretation Summary  Minimally invasive tricuspid valve replacement with 29mm [employer] Silvio Epic. Doppler  interrogation of the tricspid valve is normal. TV mean gradient 4-6 mmHg.  Moderate right ventricular dilation is present. Global right ventricular  function is mildly to moderately reduced.  Global and regional left ventricular function is normal with an EF of 60-65%.  Mild to moderate mitral insufficiency is present.  IVC diameter <2.1 cm collapsing >50% with sniff suggests a normal RA pressure  of 3 mmHg.  No pericardial effusion is present.  Compared to prior TVR is new.    2/7/2023 Device Interrogation   Device: "[employer] [id number] [place] XT DR GALLEGOS  Normal Device Function.  Mode: VVIR   bpm  : 8.6%  Presenting EGM: Narrow Complex tachycardia @ 139 bpm  Short V-V intervals: 0  Lead Trends Appear Stable.  Estimated battery longevity to RRT = 11 years. Battery voltage = 3.02 V.  Atrial Arrhythmia: AFL  Anticoagulant: Apixaban  Ventricular Arrhythmia: 4 episodes lasting 1-4 sec 194-245 bpm  Transmission discussed with Maria Esther Cutler NP. Orders to increase diltiazem from 180 mg daily to 240 mg daily.    12/5/2022 ECHO  Interpretation Summary  Severe tricuspid insufficiency is present.  Moderate right ventricular dilation is present. Global right ventricular  function is mildly reduced.  Global and regional left ventricular function is normal with an EF of 55-60%.  IVC diameter >2.1 cm collapsing <50% with sniff suggests a high RA pressure  estimated at 15 mmHg or greater.  No pericardial effusion is present.  No significant changes noted.    11/28/2022 RHC with coronary angiogram   Right sided filling pressures are severely elevated.  Mild elevated pulmonary hypertension.  Left sided filling pressures are moderately elevated.  Reduced cardiac output level.  Hemodynamic data has been modified in Epic per physician review.  Prox LAD lesion is 30% stenosed.     Mild non-obstructive coronary artery disease        Assessment and Plan:   Ms. 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 She was recently admitted 11/19/-12/5/22 with ADHF and frequent NSVT, now s/p VT ablation. [place] [facility] [place]     Overall, appearing well. Weights are stable, euvolemic by exam. Labs from last week are  stable. Cr 1.07, lytes wnl. We will plan to continue her current medications, no changes. Amelia is very mindful of her sodium intake. She wears compression stockings and monitors her weights daily. She understands to call with any weight gain or changes in symptoms. Repeat BMP in 3 months.       # Acute on chronic diastolic heart failure with dilated and mildly reduced RV function (LVEF 55-60%)  Pre-Ablation: 11/28/22 RHC: CVP 24, PA 48/27/34, PCWP 23, SVO2 46, CI 1.1, CO 1.6  Post Ablation and Diuresis: 12/3/22 RHC: CVP 16, PA 41/22/28, PCWP 15, SVO2 69, CI 2.1, CO 3.0    NYHA Class III, AHA/ACC Stage C  Rate control: 107, continue diltiazem   volume status: euvolemic, continue torsemide 20 mg daily    Blood pressure control:  Diltiazem 240 mg daily   Aldosterone antagonist: Aldactone 12.5 mg daily   SGLT2:  Empagliflozin 10 mg daily   Evaluation of coronary arteries: 11/28/22 angiogram mild non-obstructive CAD     # Frequent, non-sustained ventricular tachycardia s/p VT Ablation (by Dr. Eaton on 12/1/22)  - Anti-arrhythmic: none, s/p VT ablation  - Stopped PTA digoxin per EP recommendations  - Anticoagulation: given history of Afib, continue Apixaban 5 mg bid  - Follow up with EP specialists (Dr. Eaton and/or Maria Esther Cutler NP)    # HTN Intolerant to Metoprolol in the past.   - Diltiazem 240 mg daily, spironolactone 12.5 mg daily       # Aflutter. History of SSS s/p PPM 12/19. Long standing history of atrial arrythmias.  - Diltiazem 240 mg daily  - Continue Eliquis.   - Follow up with EP as scheduled.      # VSD s/p repair.     # History of exudative pericardial effusion.   - Stable per CT 3/19.     # Severe TR per TTE 11/2022 s/p TVR on 4/10/23  - continue ASA 81 mg daily         Follow up:  - CORE 6 months          Elmira Vasquez DNP, NP-C  Advance Heart Failure  9/20/203

## 2023-09-21 ENCOUNTER — OFFICE VISIT (OUTPATIENT)
Dept: RHEUMATOLOGY | Facility: CLINIC | Age: 63
End: 2023-09-21
Attending: INTERNAL MEDICINE
Payer: COMMERCIAL

## 2023-09-21 ENCOUNTER — LAB (OUTPATIENT)
Dept: LAB | Facility: CLINIC | Age: 63
End: 2023-09-21
Payer: COMMERCIAL

## 2023-09-21 VITALS
DIASTOLIC BLOOD PRESSURE: 74 MMHG | BODY MASS INDEX: 23.28 KG/M2 | WEIGHT: 107.9 LBS | SYSTOLIC BLOOD PRESSURE: 112 MMHG | HEART RATE: 106 BPM | HEIGHT: 57 IN

## 2023-09-21 DIAGNOSIS — M05.79 RHEUMATOID ARTHRITIS INVOLVING MULTIPLE SITES WITH POSITIVE RHEUMATOID FACTOR (H): ICD-10-CM

## 2023-09-21 DIAGNOSIS — T38.0X5A STEROID-INDUCED OSTEOPOROSIS: ICD-10-CM

## 2023-09-21 DIAGNOSIS — M81.8 STEROID-INDUCED OSTEOPOROSIS: ICD-10-CM

## 2023-09-21 DIAGNOSIS — G89.29 OTHER CHRONIC PAIN: Primary | ICD-10-CM

## 2023-09-21 LAB
ALBUMIN SERPL BCG-MCNC: 5 G/DL (ref 3.5–5.2)
ALT SERPL W P-5'-P-CCNC: 22 U/L (ref 0–70)
AST SERPL W P-5'-P-CCNC: 35 U/L (ref 0–45)
BASOPHILS # BLD AUTO: 0.1 10E3/UL (ref 0–0.2)
BASOPHILS NFR BLD AUTO: 1 %
CREAT SERPL-MCNC: 1.17 MG/DL (ref 0.51–1.17)
CRP SERPL-MCNC: 17 MG/L
EGFRCR SERPLBLD CKD-EPI 2021: 53 ML/MIN/1.73M2
EOSINOPHIL # BLD AUTO: 0.1 10E3/UL (ref 0–0.7)
EOSINOPHIL NFR BLD AUTO: 1 %
ERYTHROCYTE [DISTWIDTH] IN BLOOD BY AUTOMATED COUNT: 13 % (ref 10–15)
ERYTHROCYTE [SEDIMENTATION RATE] IN BLOOD BY WESTERGREN METHOD: 15 MM/HR (ref 0–30)
HCT VFR BLD AUTO: 45.2 % (ref 35–53)
HGB BLD-MCNC: 15.5 G/DL (ref 11.7–17.7)
IMM GRANULOCYTES # BLD: 0 10E3/UL
IMM GRANULOCYTES NFR BLD: 0 %
LYMPHOCYTES # BLD AUTO: 0.6 10E3/UL (ref 0.8–5.3)
LYMPHOCYTES NFR BLD AUTO: 10 %
MCH RBC QN AUTO: 35.9 PG (ref 26.5–33)
MCHC RBC AUTO-ENTMCNC: 34.3 G/DL (ref 31.5–36.5)
MCV RBC AUTO: 105 FL (ref 78–100)
MONOCYTES # BLD AUTO: 0.6 10E3/UL (ref 0–1.3)
MONOCYTES NFR BLD AUTO: 11 %
NEUTROPHILS # BLD AUTO: 4.5 10E3/UL (ref 1.6–8.3)
NEUTROPHILS NFR BLD AUTO: 77 %
NRBC # BLD AUTO: 0 10E3/UL
NRBC BLD AUTO-RTO: 0 /100
PLATELET # BLD AUTO: 141 10E3/UL (ref 150–450)
RBC # BLD AUTO: 4.32 10E6/UL (ref 3.8–5.9)
WBC # BLD AUTO: 5.9 10E3/UL (ref 4–11)

## 2023-09-21 PROCEDURE — 84450 TRANSFERASE (AST) (SGOT): CPT | Performed by: PATHOLOGY

## 2023-09-21 PROCEDURE — 99214 OFFICE O/P EST MOD 30 MIN: CPT | Mod: GC | Performed by: INTERNAL MEDICINE

## 2023-09-21 PROCEDURE — 84460 ALANINE AMINO (ALT) (SGPT): CPT | Performed by: PATHOLOGY

## 2023-09-21 PROCEDURE — 85025 COMPLETE CBC W/AUTO DIFF WBC: CPT | Performed by: PATHOLOGY

## 2023-09-21 PROCEDURE — 99000 SPECIMEN HANDLING OFFICE-LAB: CPT | Performed by: PATHOLOGY

## 2023-09-21 PROCEDURE — 85652 RBC SED RATE AUTOMATED: CPT | Performed by: PATHOLOGY

## 2023-09-21 PROCEDURE — G0463 HOSPITAL OUTPT CLINIC VISIT: HCPCS | Performed by: INTERNAL MEDICINE

## 2023-09-21 PROCEDURE — 86481 TB AG RESPONSE T-CELL SUSP: CPT | Performed by: INTERNAL MEDICINE

## 2023-09-21 PROCEDURE — 86803 HEPATITIS C AB TEST: CPT | Performed by: INTERNAL MEDICINE

## 2023-09-21 PROCEDURE — 82565 ASSAY OF CREATININE: CPT | Performed by: PATHOLOGY

## 2023-09-21 PROCEDURE — 82040 ASSAY OF SERUM ALBUMIN: CPT | Performed by: PATHOLOGY

## 2023-09-21 PROCEDURE — 87340 HEPATITIS B SURFACE AG IA: CPT | Performed by: INTERNAL MEDICINE

## 2023-09-21 PROCEDURE — 86140 C-REACTIVE PROTEIN: CPT | Performed by: PATHOLOGY

## 2023-09-21 PROCEDURE — 36415 COLL VENOUS BLD VENIPUNCTURE: CPT | Performed by: PATHOLOGY

## 2023-09-21 PROCEDURE — 86704 HEP B CORE ANTIBODY TOTAL: CPT | Performed by: INTERNAL MEDICINE

## 2023-09-21 RX ORDER — HYDROXYCHLOROQUINE SULFATE 200 MG/1
200 TABLET, FILM COATED ORAL DAILY
Qty: 90 TABLET | Refills: 1 | Status: SHIPPED | OUTPATIENT
Start: 2023-09-21 | End: 2024-04-24

## 2023-09-21 RX ORDER — PREDNISONE 1 MG/1
TABLET ORAL
Qty: 270 TABLET | Refills: 1 | Status: SHIPPED | OUTPATIENT
Start: 2023-09-21 | End: 2024-04-24

## 2023-09-21 ASSESSMENT — PAIN SCALES - GENERAL: PAINLEVEL: SEVERE PAIN (6)

## 2023-09-21 NOTE — NURSING NOTE
"Chief Complaint   Patient presents with    RECHECK     Follow up with RA     /74   Pulse 106   Ht 1.448 m (4' 9\")   Wt 48.9 kg (107 lb 14.4 oz)   BMI 23.35 kg/m    Bridgette Phillips CMA on 9/21/2023 at 11:05 AM    "

## 2023-09-21 NOTE — PROGRESS NOTES
Last seen: 9/16/2022    DOS: 9/21/2023    Reason for in person follow up visit: RA    [facility] - Rheumatology Clinic Visit    Amelia Michel  is a 61 year old here for rheumatoid arthritis (RA) dx 35 y/o. +CCP >331 -RF -KALYAN panel. 5-2108 XR 5-2018 DDD and facet osteoarthritis, congential fusion C2-3) prednisone since dx at 35 y/o, hydroxychloroquine (plaquenil) long-time since dx tapered by 200 mg 9-2019. Lymphoma in liver, heart, bone and mass between esophagus, left pelvic, right ankle.     Review-Dr. Dumas in [facility] for many years. She had relatively inactive disease prior to her arrest but was on methotrexate 10 mg PO per week, Arava 10 mg daily, Plaquenil 200 mg twice per day, and prednisone 3 mg daily. She has had partial fusion of her right wrist. After discharge from the hospital she was put on prednisone 5 mg daily as monotherapy. Sulfasalazine -not effective long-time, initial alan but resolved after retried. Past Dr. Cunha and Dr. 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 Treated concervatively with plaquenil and 5mg prednisone, and not interested in rituximab or other biologics. She has previously been treated with sulfasalazine and initially developed a rash which was initially thought to be a sulfa rash, but her rash resolved and did not reoccur after being placed back on sulfasalazine. She was also treated with Arava, but this discontinued during her cancer treatment. She feels that these drugs have had no effect on her symptoms. Etanercept (enbrel) in past no benefits.     March 10, 2021  Denies any fever, chills, SOB, MONTALVO, night sweats, or chest pain, high fever, cough, travel in last 14 days or exposure to covid-19 (coronavirus). Reports healthy. Follows CDC guidelines. Sinus issues for a week other then that fine. Not out tho either. Will be getting  "vaccine signing in now.     Arthritis is hand bothering her. Left worse, left hand neuropathy from antecubital IV reaction \"take out muscle and fat\". Its her thumb gets her issues. Hurts all the time.  Not swelling. Base of thumb some tingling. The same. With her history cardiac arrest and chemo does have splint, does not need OT. Walking now and mentally doing good.      Right ankle triple arthrodesis Oct 2020, learning how to walk properly. \"much better\".  Doing more more active.     Fosamax 70 mg every 7 days restarted 9-2020 per Dr. Maribell Domínguez, was on with PCP  In past \"not concerned on for too long\" will need future time off of this was off for 18 month. Prednisone 5 mg once day chronically, hydroxychloroquine (plaquenil) 200 mg once day -eye exam in Dec 2020 early catarct          9/9/2021: Ms. Michel is doing fairly well with no major complaints or RA flare ups on  mg qd+predniosne 5 mg every day. Eye exam is updated. Fully vaccinated. PCP told her to hold fosamax till further discussion in Oct.      Has pain over palms. This gets worse throughout the day. No AM stiffness. Does more chores around the house which is a trigger.     1st covid vaccine caused body ache, second dose caused fatigue.    3/9/2022:    No joint swelling, has edema due HF, joints hurt, warm shower helps, lost strength, has stiffness all day.    Eye exam is due 1/2023    Off pred since 3/3 .    9/16/2022:    Amelia is here for follow up. Has pain over R foot, is back on PT,     Has problem with fine motor, it's hard to hold a plate.    Has SOB on and off due to Afib, CHF. BP is up. Seeing cardiology.    No am stiffness.    No oral ulcers.    No rash.    Back on pred 3 mg every day.    Back on fosamax.      Today 09/21/2023    Overall reports feeling more pain .   - She has a new sharp left buttock pain radiating to the left thigh and stopping at the knee x3 months. No specific exacerbating or relieving factors.   - She " reports progressively worsening right ankle pain. In 2020 this joint was surgically fused and since then has progressively inverted and affecting her gait. She is now having increasing right ankle, knee, and hip pain.   - Chronic left knee pain worsening over time. She has had 3 joints injections in the knee but without significant improvement.    - States hands have been more crampy. She denies redness/heat/swelling. States the chronic deformities and cramping affects her dexterity and worsens her ability to complete some activities like cooking    Last saw oncology 9/14 with good report    Current medications include Plaquenil and Prednisone 3mg. Tylenol occasionally but no relief. Last eye exam 01/2023, next already scheduled for 01/2024.     At this time interested in exploring other medications due to increased pain. Questions if pain management may be beneficial as well.   - Currently on Fosamax, start Reclast on Monday    ROS   + dry itchy eyes and itchy ears (improves with claritin)   - No fevers, weight loss, night sweats, chest pain, dyspnea, cough, N/V/D, eye pain or vision changes, nail changes, mouth sores, raynauds,             PMH:  Injury-left thumb amputation, left finger broke  Medical-  Antiplatelet or antithrombotic long-term use  Arrhythmia  Atrial tachycardia, flutter  Paroxysmal atrial fibrillation  Arthritis  Lymphoma  Cardiac arrest  history of chemotherapy  Primary pulmonary hypertension  Neuropathy  Postoperative nausea and vomiting  Rheumatoid arthritis  Hyperlipidemia LDL goal <130  Lymphedema of both lower extremities  Cardiogenic shock and cardiac arrest 5/2017  Acute respiratory failure with hypoxia  Critical illness myopathy  Sepsis  Wound of left upper extremity  Diffuse large B-cell lymphoma of extranodal site  Febrile neutropenia  Physical deconditioning  Palpitations  Osteoporosis  -DEXA 2018   Slowly healing wound in medial left antecubital fossa after traumatic IV  Neuropathy  "of lower extremities attributed to high dose methotrexate, latter felt from lymphoma   Neuropathy of right foot with weakness after intrathecal cytarabine  perioditis   Right arm PICC blood clots during cancer treatments  Right shoulder effusion when had pericarditis       Past Surgical History:  Anesthesia cardioversion  Right wrist arthrodesis, partial fusion \"history migrated out\"  Bone marrow aspirate & biopsy  Excise lesion upper extremity - left wound excision and closure, possible submuscular transposition of ulnar nerve  Foot surgery - 4 left, 2 right  Carpal tunnel bilateral release  Repair ventricular septal defect  Transpositional ulnar nerve (elbow) left   Right shoulder effusion history     Family History:   No autoimmune disorders, psoriasis, UC, crohn's, SLE, RA, PsA, gout, autoimmune thyroid.  No MS, heart disease in family  Mother - breast cancer, hypertension, alzheimer disease-passed   Father - hypertension, lymphoma, circulatory A fib-passed  Paternal grandmother - diabetes  Siblings-younger sister heatlhy PB, younger brother think arthritis not dx PB     Social History:   No smoking or tobacco use. No alcohol use. No IVDU. None Children. Right handed. . Disability          ROS:    A comprehensive ROS was done. Positives are per HPI.      Ph. E:    /74   Pulse 106   Ht 1.448 m (4' 9\")   Wt 48.9 kg (107 lb 14.4 oz)   BMI 23.35 kg/m      Constitutional: NAD, pleasant  Eyes: Normal EOM, conjunctiva, sclera  ENT: no oral ulcers  Neck: No mass or thyroid enlargement  Chest: old sternotomy scar, pacemaker implanted on left chest wall, good perfusion,   : Not tested  Lymph: No cervical, supraclavicular, or epitrochlear nodes  MS: no active synovitis or joint tenderness, bilateral hands with ulnar deviation, chronic joint deformities, mild swan-neck, minimal Jaccoud arthropathy, no active synovitis, good  strength, bilateral wrists with minimal ROM in flexion and extension, small " rheumatoid nodules over right forearm, decreased ROM in shoulders consistent with rotator cuff injury, full ROM in knees bilaterally with crepitus, bilateral knees with reduced ROM in flexion and extension, no hammer toe deformities bilaterally, no ulcers on feet, rheumatoid nodule over left 5th MTP  Skin: No rash, small soft tissue nodule over left anterior distal shin  Neuro: grossly non focal  Psych: Normal affect.     Labs/Imaging:  Eye Exam (10/23/18)  Significant for mild H-lated nuclear cataracts.   Ocular CT was recommended for right eye.     CT Chest/Abdomen/Pelvis (9/26/18)  In this patient with a history of lymphoma:  1. No evidence of disease recurrence, consistent with complete  response per [place] criteria.  2. Stable cardiomegaly. Improved pericardial effusion.  3. Early contrast phase with heterogeneous visceral enhancement in the  abdomen, likely secondary to cardiac dysfunction.    MRI Cardiac w/contrast (4/12/18)  Loculated exudative pericardial effusion that is beginning to organize, with diffuse  pericardial enhancement consistent with ongoing inflammation. Normal LV fxn, LVEF 54% and RVEF 49%. At  least moderate, possibly severe tricuspid regurgitation. Compared to MRI from 03/2018, effusion is largely  unchanged in size (maybe a bit smaller) but is starting to organize. No change in pericardial enhancement.    Laboratory:   Component      Latest Ref Rng & Units 3/25/2021 5/26/2021 6/23/2021 7/21/2021   WBC      4.0 - 11.0 thou/uL 5.6      RBC Count      3.80 - 5.40 mill/uL 4.31      Hemoglobin      12.0 - 16.0 g/dL 15.4      Hematocrit      35.0 - 47.0 % 44.6      MCV      80 - 100 fL 104 (H)      MCH      27.0 - 34.0 pg 35.7 (H)      MCHC      32.0 - 36.0 g/dL 34.5      RDW      11.0 - 14.5 % 14.4      Platelet Count      140 - 440 thou/uL 126 (L)      Mean Platelet Volume      7.0 - 10.0 fL 9.5      % Neutrophils      50 - 70 % 80 (H)      % Lymphocytes      20 - 40 % 10 (L)      %  Monocytes      2 - 10 % 9      % Eosinophils      0 - 6 % 1      % Basophils      0 - 2 % 0      % Immature Granulocytes      <=0 % 0      Absolute Neutrophils      2.0 - 7.7 thou/uL 4.5      Absolute Lymphocytes      0.8 - 4.4 thou/uL 0.6 (L)      Absolute Monocytes      0.0 - 0.9 thou/uL 0.5      Eosinophils Absolute      0.0 - 0.4 thou/uL 0.0      Absolute Basophils      0.0 - 0.2 thou/uL 0.0      Absolute Immature Granulocytes      <=0.0 thou/uL 0.0      Sodium      133 - 144 mmol/L  136 136 137   Potassium      3.4 - 5.3 mmol/L  3.6 4.2 3.9   Chloride      94 - 109 mmol/L  101 102 103   Carbon Dioxide      20 - 32 mmol/L  24 28 28   Anion Gap      3 - 14 mmol/L  11 6 6   Glucose      70 - 99 mg/dL  100 (H) 98 110 (H)   Urea Nitrogen      7 - 30 mg/dL  21 25 29   Creatinine      0.52 - 1.25 mg/dL  1.00 1.05 (H) 1.02   GFR Estimate      >60 mL/min/1.73m2  61 57 (L) 60 (L)   GFR Estimate If Black      >60 mL/min/1.73:m2  71 67    Calcium      8.5 - 10.1 mg/dL  9.3 10.0 9.6   Bilirubin Direct      0.0 - 0.2 mg/dL   0.5 (H)    Bilirubin Total      0.2 - 1.3 mg/dL   1.2    Albumin      3.4 - 5.0 g/dL   4.2    Protein Total      6.8 - 8.8 g/dL   7.5    Alkaline Phosphatase      40 - 150 U/L   136    ALT      0 - 50 U/L   41    AST      0 - 45 U/L   36    N-Terminal Pro Bnp      0 - 125 pg/mL  1,274 (H)     Hemoglobin A1C      0 - 5.6 %  6.2 (H)     Immunoglobulin G      700-1,700 mg/dL 1,179          Impression/Plan:  1. Seropositive rheumatoid arthritis (, RF 31) with multiple joint disability including MTP fusion 1-5 bilaterally, partial right wrist fusion, subluxation and ulnar deformity of MCP's. Rheumatoid arthritis (RA) controlled. Continue prednisone at 3 mg every day (more pain, stiffness off prednisone when she was off), and hydroxychloroquine (plaquenil) 200 mg once a day. Rheumatoid arthritis does not seem to be flaring today, however her joint deformities are beginning to affect her quality of life  due to reduced dexterity. Given her history of Lymphoma would stay away from TNF inhibitors. Will consider Actemra for better control. Will order screening labs today and refer to pain management in the interim.     2. Long-term hydroxychloroquine (plaquenil) use since 6-2017, no toxicity so far, reviewed AAO guidelines, repeat every year.    3. Diffuse large B-cell lymphoma status-post 1 cycle R-EPOCH, 6 cycles R-CHOP; Neuropathy of lower extremities attributed to lymphoma improved on gabapentin 200 mg TID-tolerating. Loculated pericardial effusion, etiology unclear (lymphoma). No symptoms now. Last saw Oncology 09/2023 with good report.    4. Osteoporosis, chronic long-term corticosteroid use with cushingoid appearance. Received 6-7 years of alendronate in early 2000s. Restarted alendronate in 1/2019, off 3-2020 to 9-2020 then restarted 9-2020. DEXA  T-2.6 thoracic. If > 5 yr higher risk atypical Fx. Off fosamax at last visit given improvement of bone density in 3/2021 but on chronic steroid tx, did refer to endocrinology to manage steroid induced osteoporosis and endocrinology resumed it in 11/2021. Calcium and GFR normal . On Fosamax now, planning to switch to Reclast on 09/25/2023        Plan:    Continue HCQ 200mg BID and prednisone at 3 mg a day    Refer to pain management    Labs today    Return in 12 months (in person)      I saw and examined the patient with Dr. Domínguez. I acted as scribe for Dr. Domínguez.    Chilango Snow, Medical Student    Attending Note: I saw and examined the patient with medical student Chilango. This note was written by him, who acted as scribe for me. I agree with findings and recommendations written in this note. The note reflects decisions made by me.      Maribell Domínguez MD      Plan:    Labs today    Refer to pain management    Consider actemra    Return in person about 3-4 months

## 2023-09-21 NOTE — LETTER
9/21/2023       RE: Amelia Michel  [address]  [facility] [address]     Dear Colleague,    Thank you for referring your patient, Amelia Michel, to the [facility] [place] at [facility]. Please see a copy of my visit note below.    Last seen: 9/16/2022    DOS: 9/21/2023    Reason for in person follow up visit: RA    [facility] - Rheumatology Clinic Visit    Amelia Michel  is a 61 year old here for rheumatoid arthritis (RA) dx 35 y/o. +CCP >331 -RF -KALYAN panel. 5-2108 XR 5-2018 DDD and facet osteoarthritis, congential fusion C2-3) prednisone since dx at 35 y/o, hydroxychloroquine (plaquenil) long-time since dx tapered by 200 mg 9-2019. Lymphoma in liver, heart, bone and mass between esophagus, left pelvic, right ankle.     Review-Dr. Dumas in [facility] for many years. She had relatively inactive disease prior to her arrest but was on methotrexate 10 mg PO per week, Arava 10 mg daily, Plaquenil 200 mg twice per day, and prednisone 3 mg daily. She has had partial fusion of her right wrist. After discharge from the hospital she was put on prednisone 5 mg daily as monotherapy. Sulfasalazine -not effective long-time, initial alan but resolved after retried. Past Dr. Cunha and Dr. 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 Treated concervatively with plaquenil and 5mg prednisone, and not interested in rituximab or other biologics. She has previously been treated with sulfasalazine and initially developed a rash which was initially thought to be a sulfa rash, but her rash resolved and did not reoccur after being placed back on sulfasalazine. She was also treated with Arava, but this discontinued during her cancer treatment. She feels that these drugs have had no effect on her symptoms. Etanercept  "(enbrel) in past no benefits.     March 10, 2021  Denies any fever, chills, SOB, MONTALVO, night sweats, or chest pain, high fever, cough, travel in last 14 days or exposure to covid-19 (coronavirus). Reports healthy. Follows CDC guidelines. Sinus issues for a week other then that fine. Not out tho either. Will be getting vaccine signing in now.     Arthritis is hand bothering her. Left worse, left hand neuropathy from antecubital IV reaction \"take out muscle and fat\". Its her thumb gets her issues. Hurts all the time.  Not swelling. Base of thumb some tingling. The same. With her history cardiac arrest and chemo does have splint, does not need OT. Walking now and mentally doing good.      Right ankle triple arthrodesis Oct 2020, learning how to walk properly. \"much better\".  Doing more more active.     Fosamax 70 mg every 7 days restarted 9-2020 per Dr. Maribell Domínguez, was on with PCP  In past \"not concerned on for too long\" will need future time off of this was off for 18 month. Prednisone 5 mg once day chronically, hydroxychloroquine (plaquenil) 200 mg once day -eye exam in Dec 2020 early catarct          9/9/2021: Ms. Michel is doing fairly well with no major complaints or RA flare ups on  mg qd+predniosne 5 mg every day. Eye exam is updated. Fully vaccinated. PCP told her to hold fosamax till further discussion in Oct.      Has pain over palms. This gets worse throughout the day. No AM stiffness. Does more chores around the house which is a trigger.     1st covid vaccine caused body ache, second dose caused fatigue.    3/9/2022:    No joint swelling, has edema due HF, joints hurt, warm shower helps, lost strength, has stiffness all day.    Eye exam is due 1/2023    Off pred since 3/3 .    9/16/2022:    Amelia is here for follow up. Has pain over R foot, is back on PT,     Has problem with fine motor, it's hard to hold a plate.    Has SOB on and off due to Afib, CHF. BP is up. Seeing cardiology.    No am " stiffness.    No oral ulcers.    No rash.    Back on pred 3 mg every day.    Back on fosamax.      Today 09/21/2023    Overall reports feeling more pain .   - She has a new sharp left buttock pain radiating to the left thigh and stopping at the knee x3 months. No specific exacerbating or relieving factors.   - She reports progressively worsening right ankle pain. In 2020 this joint was surgically fused and since then has progressively inverted and affecting her gait. She is now having increasing right ankle, knee, and hip pain.   - Chronic left knee pain worsening over time. She has had 3 joints injections in the knee but without significant improvement.    - States hands have been more crampy. She denies redness/heat/swelling. States the chronic deformities and cramping affects her dexterity and worsens her ability to complete some activities like cooking    Last saw oncology 9/14 with good report    Current medications include Plaquenil and Prednisone 3mg. Tylenol occasionally but no relief. Last eye exam 01/2023, next already scheduled for 01/2024.     At this time interested in exploring other medications due to increased pain. Questions if pain management may be beneficial as well.   - Currently on Fosamax, start Reclast on Monday    ROS   + dry itchy eyes and itchy ears (improves with claritin)   - No fevers, weight loss, night sweats, chest pain, dyspnea, cough, N/V/D, eye pain or vision changes, nail changes, mouth sores, raynauds,             PMH:  Injury-left thumb amputation, left finger broke  Medical-  Antiplatelet or antithrombotic long-term use  Arrhythmia  Atrial tachycardia, flutter  Paroxysmal atrial fibrillation  Arthritis  Lymphoma  Cardiac arrest  history of chemotherapy  Primary pulmonary hypertension  Neuropathy  Postoperative nausea and vomiting  Rheumatoid arthritis  Hyperlipidemia LDL goal <130  Lymphedema of both lower extremities  Cardiogenic shock and cardiac arrest 5/2017  Acute  "respiratory failure with hypoxia  Critical illness myopathy  Sepsis  Wound of left upper extremity  Diffuse large B-cell lymphoma of extranodal site  Febrile neutropenia  Physical deconditioning  Palpitations  Osteoporosis  -DEXA 2018   Slowly healing wound in medial left antecubital fossa after traumatic IV  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, latter felt from lymphoma   Neuropathy of right foot with weakness after intrathecal cytarabine  perioditis   Right arm PICC blood clots during cancer treatments  Right shoulder effusion when had pericarditis       Past Surgical History:  Anesthesia cardioversion  Right wrist arthrodesis, partial fusion \"history migrated out\"  Bone marrow aspirate & biopsy  Excise lesion upper extremity - left wound excision and closure, possible submuscular transposition of ulnar nerve  Foot surgery - 4 left, 2 right  Carpal tunnel bilateral release  Repair ventricular septal defect  Transpositional ulnar nerve (elbow) left   Right shoulder effusion history     Family History:   No autoimmune disorders, psoriasis, UC, crohn's, SLE, RA, PsA, gout, autoimmune thyroid.  No MS, heart disease in family  Mother - breast cancer, hypertension, alzheimer disease-passed   Father - hypertension, lymphoma, circulatory A fib-passed  Paternal grandmother - diabetes  Siblings-younger sister heatlhy PB, younger brother think arthritis not dx PB     Social History:   No smoking or tobacco use. No alcohol use. No IVDU. None Children. Right handed. . Disability          ROS:    A comprehensive ROS was done. Positives are per HPI.      Ph. E:    /74   Pulse 106   Ht 1.448 m (4' 9\")   Wt 48.9 kg (107 lb 14.4 oz)   BMI 23.35 kg/m      Constitutional: NAD, pleasant  Eyes: Normal EOM, conjunctiva, sclera  ENT: no oral ulcers  Neck: No mass or thyroid enlargement  Chest: old sternotomy scar, pacemaker implanted on left chest wall, good perfusion,   : Not " tested  Lymph: No cervical, supraclavicular, or epitrochlear nodes  MS: no active synovitis or joint tenderness, bilateral hands with ulnar deviation, chronic joint deformities, mild swan-neck, minimal Jaccoud arthropathy, no active synovitis, good  strength, bilateral wrists with minimal ROM in flexion and extension, small rheumatoid nodules over right forearm, decreased ROM in shoulders consistent with rotator cuff injury, full ROM in knees bilaterally with crepitus, bilateral knees with reduced ROM in flexion and extension, no hammer toe deformities bilaterally, no ulcers on feet, rheumatoid nodule over left 5th MTP  Skin: No rash, small soft tissue nodule over left anterior distal shin  Neuro: grossly non focal  Psych: Normal affect.     Labs/Imaging:  Eye Exam (10/23/18)  Significant for mild H-lated nuclear cataracts.   Ocular CT was recommended for right eye.     CT Chest/Abdomen/Pelvis (9/26/18)  In this patient with a history of lymphoma:  1. No evidence of disease recurrence, consistent with complete  response per Lugano criteria.  2. Stable cardiomegaly. Improved pericardial effusion.  3. Early contrast phase with heterogeneous visceral enhancement in the  abdomen, likely secondary to cardiac dysfunction.    MRI Cardiac w/contrast (4/12/18)  Loculated exudative pericardial effusion that is beginning to organize, with diffuse  pericardial enhancement consistent with ongoing inflammation. Normal LV fxn, LVEF 54% and RVEF 49%. At  least moderate, possibly severe tricuspid regurgitation. Compared to MRI from 03/2018, effusion is largely  unchanged in size (maybe a bit smaller) but is starting to organize. No change in pericardial enhancement.    Laboratory:   Component      Latest Ref Rng & Units 3/25/2021 5/26/2021 6/23/2021 7/21/2021   WBC      4.0 - 11.0 thou/uL 5.6      RBC Count      3.80 - 5.40 mill/uL 4.31      Hemoglobin      12.0 - 16.0 g/dL 15.4      Hematocrit      35.0 - 47.0 % 44.6      MCV       80 - 100 fL 104 (H)      MCH      27.0 - 34.0 pg 35.7 (H)      MCHC      32.0 - 36.0 g/dL 34.5      RDW      11.0 - 14.5 % 14.4      Platelet Count      140 - 440 thou/uL 126 (L)      Mean Platelet Volume      7.0 - 10.0 fL 9.5      % Neutrophils      50 - 70 % 80 (H)      % Lymphocytes      20 - 40 % 10 (L)      % Monocytes      2 - 10 % 9      % Eosinophils      0 - 6 % 1      % Basophils      0 - 2 % 0      % Immature Granulocytes      <=0 % 0      Absolute Neutrophils      2.0 - 7.7 thou/uL 4.5      Absolute Lymphocytes      0.8 - 4.4 thou/uL 0.6 (L)      Absolute Monocytes      0.0 - 0.9 thou/uL 0.5      Eosinophils Absolute      0.0 - 0.4 thou/uL 0.0      Absolute Basophils      0.0 - 0.2 thou/uL 0.0      Absolute Immature Granulocytes      <=0.0 thou/uL 0.0      Sodium      133 - 144 mmol/L  136 136 137   Potassium      3.4 - 5.3 mmol/L  3.6 4.2 3.9   Chloride      94 - 109 mmol/L  101 102 103   Carbon Dioxide      20 - 32 mmol/L  24 28 28   Anion Gap      3 - 14 mmol/L  11 6 6   Glucose      70 - 99 mg/dL  100 (H) 98 110 (H)   Urea Nitrogen      7 - 30 mg/dL  21 25 29   Creatinine      0.52 - 1.25 mg/dL  1.00 1.05 (H) 1.02   GFR Estimate      >60 mL/min/1.73m2  61 57 (L) 60 (L)   GFR Estimate If Black      >60 mL/min/1.73:m2  71 67    Calcium      8.5 - 10.1 mg/dL  9.3 10.0 9.6   Bilirubin Direct      0.0 - 0.2 mg/dL   0.5 (H)    Bilirubin Total      0.2 - 1.3 mg/dL   1.2    Albumin      3.4 - 5.0 g/dL   4.2    Protein Total      6.8 - 8.8 g/dL   7.5    Alkaline Phosphatase      40 - 150 U/L   136    ALT      0 - 50 U/L   41    AST      0 - 45 U/L   36    N-Terminal Pro Bnp      0 - 125 pg/mL  1,274 (H)     Hemoglobin A1C      0 - 5.6 %  6.2 (H)     Immunoglobulin G      700-1,700 mg/dL 1,179          Impression/Plan:  1. Seropositive rheumatoid arthritis (, RF 31) with multiple joint disability including MTP fusion 1-5 bilaterally, partial right wrist fusion, subluxation and ulnar deformity of  MCP's. Rheumatoid arthritis (RA) controlled. Continue prednisone at 3 mg every day (more pain, stiffness off prednisone when she was off), and hydroxychloroquine (plaquenil) 200 mg once a day. Rheumatoid arthritis does not seem to be flaring today, however her joint deformities are beginning to affect her quality of life due to reduced dexterity. Given her history of Lymphoma would stay away from TNF inhibitors. Will consider Actemra for better control. Will order screening labs today and refer to pain management in the interim.     2. Long-term hydroxychloroquine (plaquenil) use since 6-2017, no toxicity so far, reviewed AAO guidelines, repeat every year.    3. Diffuse large B-cell lymphoma status-post 1 cycle R-EPOCH, 6 cycles R-CHOP; Neuropathy of lower extremities attributed to lymphoma improved on gabapentin 200 mg TID-tolerating. Loculated pericardial effusion, etiology unclear (lymphoma). No symptoms now. Last saw Oncology 09/2023 with good report.    4. Osteoporosis, chronic long-term corticosteroid use with cushingoid appearance. Received 6-7 years of alendronate in early 2000s. Restarted alendronate in 1/2019, off 3-2020 to 9-2020 then restarted 9-2020. DEXA  T-2.6 thoracic. If > 5 yr higher risk atypical Fx. Off fosamax at last visit given improvement of bone density in 3/2021 but on chronic steroid tx, did refer to endocrinology to manage steroid induced osteoporosis and endocrinology resumed it in 11/2021. Calcium and GFR normal . On Fosamax now, planning to switch to Reclast on 09/25/2023        Plan:    Continue HCQ 200mg BID and prednisone at 3 mg a day    Refer to pain management    Labs today    Return in 12 months (in person)      I saw and examined the patient with Dr. Domínguez. I acted as scribe for Dr. Domínguez.    Chilango Snow, Medical Student    Attending Note: I saw and examined the patient with medical student Chilango. This note was written by him, who acted as scribe for me. I  agree with findings and recommendations written in this note. The note reflects decisions made by me.      Maribell Domínguez MD      Plan:    Labs today    Refer to pain management    Consider actemra    Return in person about 3-4 months

## 2023-09-22 LAB
GAMMA INTERFERON BACKGROUND BLD IA-ACNC: 0.01 IU/ML
HBV CORE AB SERPL QL IA: NONREACTIVE
HBV SURFACE AG SERPL QL IA: NONREACTIVE
HCV AB SERPL QL IA: NONREACTIVE
M TB IFN-G BLD-IMP: NEGATIVE
M TB IFN-G CD4+ BCKGRND COR BLD-ACNC: 9.99 IU/ML
MITOGEN IGNF BCKGRD COR BLD-ACNC: 0.02 IU/ML
MITOGEN IGNF BCKGRD COR BLD-ACNC: 0.04 IU/ML
QUANTIFERON MITOGEN: 10 IU/ML
QUANTIFERON NIL TUBE: 0.01 IU/ML
QUANTIFERON TB1 TUBE: 0.05 IU/ML
QUANTIFERON TB2 TUBE: 0.03

## 2023-09-25 ENCOUNTER — INFUSION THERAPY VISIT (OUTPATIENT)
Dept: INFUSION THERAPY | Facility: HOSPITAL | Age: 63
End: 2023-09-25
Attending: INTERNAL MEDICINE
Payer: COMMERCIAL

## 2023-09-25 VITALS
BODY MASS INDEX: 23.13 KG/M2 | SYSTOLIC BLOOD PRESSURE: 108 MMHG | TEMPERATURE: 98.2 F | OXYGEN SATURATION: 98 % | HEIGHT: 57 IN | DIASTOLIC BLOOD PRESSURE: 64 MMHG | RESPIRATION RATE: 16 BRPM | WEIGHT: 107.2 LBS | HEART RATE: 76 BPM

## 2023-09-25 DIAGNOSIS — T38.0X5A STEROID-INDUCED OSTEOPOROSIS: Primary | ICD-10-CM

## 2023-09-25 DIAGNOSIS — M81.8 STEROID-INDUCED OSTEOPOROSIS: Primary | ICD-10-CM

## 2023-09-25 PROCEDURE — 250N000011 HC RX IP 250 OP 636: Mod: JZ | Performed by: INTERNAL MEDICINE

## 2023-09-25 PROCEDURE — 258N000003 HC RX IP 258 OP 636: Performed by: INTERNAL MEDICINE

## 2023-09-25 PROCEDURE — 96365 THER/PROPH/DIAG IV INF INIT: CPT

## 2023-09-25 RX ORDER — ALBUTEROL SULFATE 0.83 MG/ML
2.5 SOLUTION RESPIRATORY (INHALATION)
Status: CANCELLED | OUTPATIENT
Start: 2024-09-24

## 2023-09-25 RX ORDER — DIPHENHYDRAMINE HYDROCHLORIDE 50 MG/ML
50 INJECTION INTRAMUSCULAR; INTRAVENOUS
Status: DISCONTINUED | OUTPATIENT
Start: 2023-09-25 | End: 2023-09-25

## 2023-09-25 RX ORDER — ALBUTEROL SULFATE 90 UG/1
1-2 AEROSOL, METERED RESPIRATORY (INHALATION)
Status: DISCONTINUED | OUTPATIENT
Start: 2023-09-25 | End: 2023-09-25

## 2023-09-25 RX ORDER — EPINEPHRINE 1 MG/ML
0.3 INJECTION, SOLUTION INTRAMUSCULAR; SUBCUTANEOUS EVERY 5 MIN PRN
Status: DISCONTINUED | OUTPATIENT
Start: 2023-09-25 | End: 2023-09-25

## 2023-09-25 RX ORDER — HEPARIN SODIUM (PORCINE) LOCK FLUSH IV SOLN 100 UNIT/ML 100 UNIT/ML
5 SOLUTION INTRAVENOUS
Status: CANCELLED | OUTPATIENT
Start: 2024-09-24

## 2023-09-25 RX ORDER — ZOLEDRONIC ACID 5 MG/100ML
5 INJECTION, SOLUTION INTRAVENOUS ONCE
Status: COMPLETED | OUTPATIENT
Start: 2023-09-25 | End: 2023-09-25

## 2023-09-25 RX ORDER — METHYLPREDNISOLONE SODIUM SUCCINATE 125 MG/2ML
125 INJECTION, POWDER, LYOPHILIZED, FOR SOLUTION INTRAMUSCULAR; INTRAVENOUS
Status: CANCELLED
Start: 2024-09-24

## 2023-09-25 RX ORDER — ALBUTEROL SULFATE 90 UG/1
1-2 AEROSOL, METERED RESPIRATORY (INHALATION)
Status: CANCELLED
Start: 2024-09-24

## 2023-09-25 RX ORDER — HEPARIN SODIUM,PORCINE 10 UNIT/ML
5-20 VIAL (ML) INTRAVENOUS DAILY PRN
Status: CANCELLED | OUTPATIENT
Start: 2024-09-24

## 2023-09-25 RX ORDER — EPINEPHRINE 1 MG/ML
0.3 INJECTION, SOLUTION INTRAMUSCULAR; SUBCUTANEOUS EVERY 5 MIN PRN
Status: CANCELLED | OUTPATIENT
Start: 2024-09-24

## 2023-09-25 RX ORDER — ZOLEDRONIC ACID 5 MG/100ML
5 INJECTION, SOLUTION INTRAVENOUS ONCE
Status: CANCELLED
Start: 2024-09-24

## 2023-09-25 RX ORDER — ALBUTEROL SULFATE 0.83 MG/ML
2.5 SOLUTION RESPIRATORY (INHALATION)
Status: DISCONTINUED | OUTPATIENT
Start: 2023-09-25 | End: 2023-09-25

## 2023-09-25 RX ORDER — METHYLPREDNISOLONE SODIUM SUCCINATE 125 MG/2ML
125 INJECTION, POWDER, LYOPHILIZED, FOR SOLUTION INTRAMUSCULAR; INTRAVENOUS
Status: DISCONTINUED | OUTPATIENT
Start: 2023-09-25 | End: 2023-09-25

## 2023-09-25 RX ORDER — DIPHENHYDRAMINE HYDROCHLORIDE 50 MG/ML
50 INJECTION INTRAMUSCULAR; INTRAVENOUS
Status: CANCELLED
Start: 2024-09-24

## 2023-09-25 RX ADMIN — SODIUM CHLORIDE 50 ML: 9 INJECTION, SOLUTION INTRAVENOUS at 13:42

## 2023-09-25 RX ADMIN — ZOLEDRONIC ACID 5 MG: 5 INJECTION, SOLUTION INTRAVENOUS at 12:42

## 2023-09-25 NOTE — PROGRESS NOTES
Infusion Nursing Note:  Amelia Michel presents today for Reclast infusion.    Patient seen by provider today: No   present during visit today: Not Applicable.    Note: Amelia comes in today for her Reclast infusion. I went over the plan of care and information about the medication with Amelia and she verbalized understanding. PIV placed with great blood return throughout in her right forearm. Reclast was hung per order over 60 minutes and then I flushed with 50 ml's of normal saline. Amelia tolerated the Reclast with no sign or symptom of a reaction.  Patient is on a 2000 cc fluid restriction and will drink all her fluid so we did not want to give to much normal saline. Patient is aware to drink her max amount of fluid today and over the next couple of days. PIV was removed and covered with gauze and coban and I did hold pressure because Amelia does bleed easily, AVS was printed and reviewed. Amelia left ambulatory to the [facility]..      Intravenous Access:  Peripheral IV placed.    Treatment Conditions:  Results reviewed, labs MET treatment parameters, ok to proceed with treatment.      Post Infusion Assessment:  Patient tolerated infusion without incident.  Patient observed for 30 minutes post.  Blood return noted pre and post infusion.  Site patent and intact, free from redness, edema or discomfort.  No evidence of extravasations.  Access discontinued per protocol.       Discharge Plan:   Discharge instructions reviewed with: Patient.  Copy of AVS reviewed with patient and/or family.    Patient discharged in stable condition accompanied by: self.  Departure Mode: Ambulatory.      STERLING ARGUELLO RN

## 2023-09-28 DIAGNOSIS — M06.9 RHEUMATOID ARTHRITIS OF BOTH ANKLES, UNSPECIFIED WHETHER RHEUMATOID FACTOR PRESENT (H): Chronic | ICD-10-CM

## 2023-09-28 RX ORDER — GABAPENTIN 100 MG/1
CAPSULE ORAL
Qty: 360 CAPSULE | Refills: 0 | Status: SHIPPED | OUTPATIENT
Start: 2023-09-28 | End: 2023-12-22

## 2023-09-29 ENCOUNTER — TELEPHONE (OUTPATIENT)
Dept: RHEUMATOLOGY | Facility: CLINIC | Age: 63
End: 2023-09-29
Payer: COMMERCIAL

## 2023-09-29 DIAGNOSIS — M05.79 RHEUMATOID ARTHRITIS INVOLVING MULTIPLE SITES WITH POSITIVE RHEUMATOID FACTOR (H): Primary | ICD-10-CM

## 2023-09-29 DIAGNOSIS — G89.29 OTHER CHRONIC PAIN: ICD-10-CM

## 2023-09-29 NOTE — TELEPHONE ENCOUNTER
Patient was told that Dr. Domínguez was going to place a referral to pain management. She has not been contacted. Please place referral.

## 2023-10-03 NOTE — DISCHARGE INSTRUCTIONS
Mouth Rinse = 1 tsp salt + 1 tsp baking soda to 1 quart water        Contact Numbers    [facility] Main Line: [phone]  [facility] Triage:  [phone]    Call triage with chills and/or temperature greater than or equal to 100.4, uncontrolled nausea/vomiting, diarrhea, constipation, dizziness, shortness of breath, chest pain, bleeding, unexplained bruising, or any new/concerning symptoms, questions/concerns.         If you are having any concerning symptoms or wish to speak to a provider before your next infusion visit, please call your care coordinator or triage to notify them so we can adequately serve you.          Sheath was removed in the right internal jugular vein. Site closed by manual compression.

## 2023-10-06 ENCOUNTER — MYC MEDICAL ADVICE (OUTPATIENT)
Dept: FAMILY MEDICINE | Facility: CLINIC | Age: 63
End: 2023-10-06
Payer: COMMERCIAL

## 2023-10-06 NOTE — PROGRESS NOTES
Date:10/6/2023      COMPREHENSIVE PAIN CLINIC INITIAL EVALUATION    I had the pleasure of meeting   Amelia Michel on 10/9/2023 in the [facility] in consult for Dr. Maribell Domínguez with regards to Amelia Michel's pain.  The patient is a 62 year old adult with past medical history of Rheumatoid arthritis at age 35yo, neuropathy, A-fib, CHF, implanted pacemaker, B-Cell lymphoma 2017 with chemo, HTN, L) thumb amputation, who presents for evaluation of chronic pain.  Patient is on Eliquis 5 mg BID.    Subjective:  Patient endorses chronic pain in L) knee, L) hip, L) groin, low back pain that radiates into L) buttock and posterior thigh.  Patient reports L) sciatica.  She fell against a car last week and has a large bruise on hip. She did not go to the ER or UC.  The patient describes the pain as constant, achey, sharp/stabbing at times.  Amelia Michel reports that the pain is made worse by position changes, prolong standing, ADL's, bending.  Amelia Michel's pain is improved with gentle movement, Hemp Cream.  Amelia Michel rates current pain score at 4/10, but it can be as low as 2/10 or as severe as 6/10.  She has dizziness at times.  She saw [facility] Solomon and had synvisc injection with no benefit.  She plans on following up with [facility] for possible surgical intervention for L) knee and hip.  She had open heart surgery last Spring and is not a candidate for any surgery for 6 months.  She uses a rolling walker with a chair.  She does not follow with a mental health care provider.  She has a pacemaker.    Progress Notes Reviewed:  09/21/2023 Dr. Maribell Domínguez, Rheumatology  09/20/2023 Elmira Vasquez, [facility] Cardiology  09/14/2023 Zuleima Lin, [facility] Oncology  08/29/2023 Dr. Dima Shrestha, Endocrinology  06/30/2023 Dr. Romeo Quintana, [facility] - L) knee pain    Amelia Michel denies any new problems with falls or balance, any new numbness or weakness of the arms or legs, any new bowel  or bladder incontinence, any night sweats or unexplained fevers, or any sudden or unexpected weight loss.      Amelia Michel has not been seen at a pain clinic in the past.      Patient reported symptoms:  Patient Supplied Answers To the UC Pain Questionnaire       No data to display                Current treatments:  Reclast Infusions  Plaquenil  Prednisone 3mg daily  Gabapentiin 100mg QID since 2018 previously on 300mg TID  Acetaminophen 750mg q 6 hrs  Foxamax q Monday  Eliquis 5mg BID  Stool softeners prn  Nothing But Hemp 3,000mg cream to knee   Relief CBD and CBDA with lefagen and PEA 1 tab q hs  Heat/ice - not that effective      Previous medication treatments included:  Anti-convulsants: never tried pregabalin  Muscle relaxors: robaxin after heart surgery  Anti-depressants: amitriptyline - day time grogginess  Acetaminophen/NSAIDs:   Topicals: Capsacsin cream causes burning, voltaren gel 1%  Opioids:     Other treatments have included:  Physical therapy: She did cardiac rehab.  No PT for knee or hip  Pain Psychology:   Chiropractic: no  Acupuncture: no  TENs Unit: no  Injections: L) knee joint synvisc injection  Surgeries: 2020 R) ankle fusion    Past Medical History:  Medical history reviewed.   Past Medical History:   Diagnosis Date    Arthritis     Cardiac arrest (H)     History of blood clots 2017    PICC line Right arm     History of chemotherapy     History of transfusion     Hypertension     Neuropathy     Physical deconditioning     PONV (postoperative nausea and vomiting)     Positive PPD, 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    VSD (ventricular septal defect)       Patient Active Problem List   Diagnosis    HTN (hypertension)    Primary pulmonary hypertension (H)    Hyperlipidemia LDL goal <130    RA (rheumatoid arthritis) (H)    Lymphedema of both lower extremities    Atrial tachycardia    Cardiogenic shock (H)    Critical illness myopathy    Diffuse large B-cell lymphoma of extranodal site (H)    Diffuse large B cell  lymphoma (H)    Febrile neutropenia     Physical deconditioning    DLBCL (diffuse large B cell lymphoma) (H)    H/O cardiac arrest    Paroxysmal atrial fibrillation (H)    Atrial flutter, unspecified type (H)    Cervical cancer screening    Mild protein-calorie malnutrition (H24)    Embolism and thrombosis of unspecified artery (H)    Thrombocytopenia, unspecified (H24)    Heart palpitations    Bradycardia    CHF (congestive heart failure) (H)    Chronic kidney disease, stage 3    Iatrogenic cushingoid features (H24)    Steroid-induced osteoporosis    Hyponatremia    Acute on chronic diastolic heart failure (H)    Severe tricuspid regurgitation    Hypervolemia, unspecified hypervolemia type    Edema, unspecified type    Acute heart failure with preserved ejection fraction (HFpEF) (H)    S/P TVR (tricuspid valve replacement)       Past Surgical History:  Pertinent surgical history reviewed.   Past Surgical History:   Procedure Laterality Date    ANESTHESIA CARDIOVERSION N/A 5/17/2017    Procedure: ANESTHESIA CARDIOVERSION;  ANESTHESIA CARDIOVERSION;  Surgeon: GENERIC ANESTHESIA PROVIDER;  Location: [facility]    ANESTHESIA CARDIOVERSION  3/12/2018    Procedure: ANESTHESIA CARDIOVERSION;;  Surgeon: GENERIC ANESTHESIA PROVIDER;  Location: [facility] OR    ANESTHESIA CARDIOVERSION N/A 3/23/2018    Procedure: ANESTHESIA CARDIOVERSION;  Anesthesia Cardioversion ;  Surgeon: GENERIC ANESTHESIA PROVIDER;  Location: [facility] OR    ANESTHESIA CARDIOVERSION N/A 4/12/2018    Procedure: ANESTHESIA CARDIOVERSION;  Cardioversion;  Surgeon: GENERIC ANESTHESIA PROVIDER;  Location: [facility] OR    ARTHRODESIS WRIST  2000    Right wrist    BONE MARROW ASPIRATE &BIOPSY  7/12/2017         BONE MARROW BIOPSY W/ ASPIRATION  07/12/2017    CARDIOVERSION      5/17/17, 3/12/18, 3/23/18, 4/14/18,     COLONOSCOPY N/A 11/19/2019    Procedure: Colonoscopy, With Polypectomy And Biopsy;  Surgeon: Pj Vasques MD;  Location: [facility]    CV CORONARY ANGIOGRAM N/A 11/28/2022     Procedure: Coronary Angiogram;  Surgeon: Sundar Wood MD;  Location: [facility] CARDIAC CATH LAB    CV RIGHT HEART CATH MEASUREMENTS RECORDED N/A 11/28/2022    Procedure: Right Heart Catheterization;  Surgeon: Sundar Wood MD;  Location: [facility] CARDIAC CATH LAB    EP ABLATION PVC N/A 12/1/2022    Procedure: Ablation Premature Ventricular Contractions;  Surgeon: Juan Eaton MD;  Location:  HEART CARDIAC CATH LAB    EP PACEMAKER N/A 12/13/2019    Procedure: EP Pacemaker;  Surgeon: Pj Trent MD;  Location: [facility] CARDIAC CATH LAB    EXCISE LESION UPPER EXTREMITY Left 5/22/2018    Procedure: EXCISE LESION UPPER EXTREMITY;  Left Arm Wound Excision And Closure, Possible Submuscular Transposition of Ulnar Nerve  (Choice Anesthesia);  Surgeon: Kevin Sheldon MD;  Location:  OR    FOOT SURGERY      4 left and 2 right    FOOT SURGERY      4 Left and 2 Right     IMPLANT PACEMAKER  12/13/2019    Dr China Trent  [facility] Cardiac Cath lab     IMPLANT PACEMAKER      RELEASE CARPAL TUNNEL Bilateral     RELEASE CARPAL TUNNEL BILATERAL      REPAIR VALVE TRICUSPID MINIMALLY INVASIVE N/A 4/10/2023    Procedure: MINIMALLY INVASIVE TRICUSPID VALVE REPLACEMENT USING 29MM ST. NILAM EPIC VALVE, FEMORAL CANNULATION AND RIGHT GROIN CUTDOWN, CARDIOPULMONARY BYPASS, TRANSESOPHAGEAL ECHOCARDIOGRAM PERFORMED BY ANESTHESIA STAFF;  Surgeon: Chilango Cope MD;  Location:  OR    REPAIR VENTRICULAR SEPTAL DEFECT  1969    TRANSPOSITION ULNAR NERVE (ELBOW) Left 5/22/2018    Procedure: TRANSPOSITION ULNAR NERVE (ELBOW);;  Surgeon: Kevin Sheldon MD;  Location:  OR    ULNAR NERVE TRANSPOSITION Left 05/22/2018    VSD REPAIR  1969    ZZC FUSION FOOT BONES,SUBTALAR Right 10/20/2020    Procedure: RIGHT SUBTALAR JOINT FUSION AND CALCANEOCUBOID FUSION;  Surgeon: Nemesio Crow MD;  Location: [facility] OR;  Service: Orthopedics        Medications: Pertinent medications reviewed.  Current Outpatient  Medications   Medication Sig Dispense Refill    acetaminophen (TYLENOL) 325 MG tablet Take 2 tablets (650 mg) by mouth every 4 hours as needed for other (For optimal non-opioid multimodal pain management to improve pain control.) 100 tablet 1    alendronate (FOSAMAX) 70 MG tablet Take 1 tablet (70 mg) by mouth every 7 days (Patient taking differently: Take 70 mg by mouth every 7 days Mondays) 12 tablet 3    apixaban ANTICOAGULANT (ELIQUIS ANTICOAGULANT) 5 MG tablet Take 1 tablet (5 mg) by mouth 2 times daily 180 tablet 3    aspirin (ASA) 81 MG chewable tablet 1 tablet (81 mg) by Oral or NG Tube route daily 30 tablet 2    calcium carbonate 600 mg-vitamin D 400 units (CALTRATE) 600-400 MG-UNIT per tablet Take 2 tablets by mouth daily      dexamethasone (DECADRON) 4 MG/ML injection Inject 4 mg IM for adrenal crisis 2 mL 3    diltiazem ER (DILT-XR) 240 MG 24 hr ER beaded capsule Take 1 capsule (240 mg) by mouth daily 90 capsule 3    docusate sodium (COLACE) 100 MG capsule Take 100 mg by mouth 2 times daily as needed for constipation      empagliflozin (JARDIANCE) 10 MG TABS tablet Take 1 tablet (10 mg) by mouth daily for 360 days 90 tablet 3    gabapentin (NEURONTIN) 100 MG capsule TAKE ONE CAPSULE BY MOUTH EVERY MORNING, 1 CAPSULE IN THE AFTERNOON, AND THEN TAKE 2 CAPSULES EVERY NIGHT AT BEDTIME 360 capsule 0    hydroxychloroquine (PLAQUENIL) 200 MG tablet Take 1 tablet (200 mg) by mouth daily 90 tablet 1    loratadine (CLARITIN) 10 MG tablet Take 10 mg by mouth daily      magnesium oxide 200 MG TABS Take 200 mg by mouth daily bedtime      metolazone (ZAROXOLYN) 2.5 MG tablet Take 1 tablet (2.5 mg) by mouth as needed (ONLY take if instructed to do so by your cardiology team.) 5 tablet 0    multivitamin, therapeutic with minerals (THERA-VIT-M) TABS tablet Take 1 tablet by mouth daily 30 each 0    potassium chloride ER (KLOR-CON M) 20 MEQ CR tablet Take 3 tablets (60 mEq) by mouth daily 270 tablet 3    predniSONE  (DELTASONE) 1 MG tablet Take 3 tablets (3 mg) by mouth daily - Oral 270 tablet 1    predniSONE (DELTASONE) 5 MG tablet 10 mg when sick for 3 days or until back to baseline for secondary adrenal insufficiency 30 tablet 3    spironolactone (ALDACTONE) 25 MG tablet Take 0.5 tablets (12.5 mg) by mouth daily 30 tablet 3    Vitamin D (Cholecalciferol) 10 MCG (400 UNIT) TABS Take 1 tablet by mouth daily         [place] Prescription Monitoring Program reviewed 10/09/2023.  No concern for abuse or misuse of controlled medications based on this report.  09/28/2023 Gabapentin 100 as for 90 days  07/02/2023 Gabapentin 100 as for 90 days  04/17/2023 Oxycodone 5mg 10 tabs for 2 days - post cardiac valve surgery  03/23/2023 Gabapentin 100mg 360 tabs for 90 days      Allergies: Pertinent allergies reviewed     Allergies   Allergen Reactions    Amiodarone Other (See Comments) and Difficulty breathing     Lethargic and had trouble breathing- occurred in 2017    Blood Transfusion Related (Informational Only) Hives     Hives with platelets. Give benadryl premedication.    Metoprolol Other (See Comments)     Pt and  report that metoprolol does not work for her and also reports feeling unwell with this medication. She has been able to tolerate atenolol, which as worked in controlling her HR.     Other [No Clinical Screening - See Comments]      Penicillin Allergy Skin Test not performed, see antimicrobial management team progress note 7/5/17.      Penicillins      Childhood reaction, concern for tongue swelling    Tape [Adhesive Tape] Rash       Family History:   family history includes Alzheimer Disease in Amelia Michel's maternal grandmother and mother; Arthritis in Amelia Michel's maternal grandfather; Atrial fibrillation in Amelia Michel's father; Breast Cancer in Amelia Michel's mother; Cerebrovascular Disease in Amelia Michel's father and mother; Diabetes in Amelia Michel's paternal grandmother;  Hypertension in Amelia Michel's father and mother; Lymphoma in Amelia Michel's father; Pneumonia in Amelia Michel's maternal grandfather; Prostate Cancer in Amelia Michel's father.    Social History:   Patient is  and lives in a 1 level house in [place].  She is on medical disability.  She is independent in ADL's.  She enjoys crossword puzzles.  She does not exercise on a regular basis. She does not have any children or pets.  Amelia Michel  reports that Amelia Michel has never smoked. Amelia Michel has never used smokeless tobacco. Amelia Michel reports that Amelia Michel does not currently use alcohol. Amelia Michel reports that Amelia Michel does not use drugs.  Social History     Social History Narrative    Not on file         Review of Systems:      (Positive responses bolded)  GENERAL: fever/chills, fatigue, general unwell feeling, weight gain/loss.  HEAD/EYES:  headache, dizziness, or vision changes.    EARS/NOSE/THROAT: nosebleeds, hearing loss, sinus infection, earache, tinnitus.  IMMUNE:  allergies, cancer, immune deficiency, or infections.  SKIN:  itching, rash, hives  HEME/Lymphatic: anemia, easy bruising, easy bleeding.  RESPIRATORY: cough, wheezing, or shortness of breath  CARDIOVASCULAR/Circulation: extremity edema, syncope, hypertension, tachycardia, or angina.  GASTROINTESTINAL: abdominal pain, nausea/emesis, diarrhea, constipation, hematochezia, or melena.  ENDOCRINE:  diabetes, steroid use, thyroid disease or osteoporosis.  MUSCULOSKELETAL: myalgias, joint pain, stiffness, neck pain, back pain, arthritis, or gout.  GENITOURINARY: frequency, urgency, dysuria, difficulty voiding, hematuria or incontinence.  NEUROLOGIC: weakness, numbness, paresthesias, seizure, tremor, stroke or memory loss.  PSYCHIATRIC: depression, anxiety, stress, suicidal thoughts or mood swings.     Physical Exam:  There were no vitals taken for this visit.    Physical Exam    Constitutional: Amelia Michel is oriented to person, place, and time.  Amelia Michel appears well-developed and well-nourished. Amelia Michel is not in acute distress.   HENT:     Head: Normocephalic and atraumatic.     Eyes: Pupils are equal, round, and reactive to light. EOM are normal. No scleral icterus.   Pulmonary/Chest:  NWOB. No respiratory distress.   Neurological: Amelia Michel is alert and oriented to person, place, and time. Coordination grossly normal.  Romberg test declined due to dizziness and fear of fallling.  Skin: Skin is warm and dry, not diaphoretic.   Psychiatric: Amelia Michel has a normal mood and affect. Amelia Michel's behavior is normal. Judgment and thought content normal.  She answers questions appropriately.  MSK: Gait is unsteady and antalgic.  Patient cannot walk on toes, heals, heal toe walk and perform heal to shin testing without difficulty.        Lumbar/Thoracic spine:   ROM: flexion 45 degrees, extension 10 degrees, left lateral 20 degrees, and right lateral 20 degrees  Rotation/ext to right: painful   Rotation/ext to left: painful   Myofascial tenderness:left para lumbar muscles, right para lumbar muscles  Focal tenderness: No SI joint, gluteal, piriformis, or GT tenderness  Normal 5/5 LE strength bilaterally   Normal sensation to light touch in the lower extremities bilaterally   Reflexes: Lower extremity reflexes within normal limits bilaterally  Straight leg raise: Negative on the left, Negative on the right  FADIR (Flexion ADDuction Internal Rotation test): + left - right        Imaging:  Imaging is at [facility].    Offered to get lumbar MRI and she declined.    EMG:  na      Diagnosis:  (M25.562,  G89.29) Chronic pain of left knee  (primary encounter diagnosis)  Comment: No benefit from Synvisc injection.  She did not have steroid injection in knee.  Plan: She wants to follow up with [facility] to see if she is a surgical candidate.    (M25.552) Hip pain,  left  Comment:   Plan: She wants to follow up with O to see what her options are.    (M15.9) Osteoarthritis of multiple joints, unspecified osteoarthritis type  Comment:   Plan: Refer to Katiuska Brito in [place]    (G89.29) Chronic intractable pain      Plan:  A multimodal plan was developed today to treat your pain.  Multimodal analgesia is a strategy that reduces reliance on opioids through the use of non-opioid analgesics and therapies that have different mechanisms of action.      Diagnostics: imaging is at [facility].        Medications:  The following OTC pain medications may be helpful, use as directed: Voltaren Gel 1%, CBD products, Capsaicin products, Australian Dream Cream, Epson It,  Arnica products, Lidocaine Patch, Solanpas, Biofreeze, Aspercream, Tiger Balm and Sumeet Emu cream.  Apply heat or cold PRN.      Therapies  Discussed anti-inflammatory lifestyle.    Discussed Pain Psychology  Psychological treatments are also important part of pain management.  Understanding and managing the thoughts, emotions and behaviors that accompany the discomfort can help you cope more effectively with your pain and can actually reduce the intensity of your pain.    Discussed Physical Therapy. Refer to Etelvinajonnie orantesney in [facility] for L) hip and L) knee pain.  PHYSICAL THERAPY: Encourage patient to continue working with physical therapy.  Discussed the importance of core strengthening, ROM, stretching exercises with the patient and how each of these entities is important in decreasing pain.  Explained to the patient that the purpose of physical therapy is to teach the patient a home exercise program.  These exercises need to be performed every day in order to decrease pain and prevent future occurrences of pain.        Discussed Grounding Mat - handout provided  https://www.[employer].com/watch?v=[email or web address]    Discussed Frequency Specific Microcurrent - handout provided  Treatment for Neuropathic Pain.   Transitions in  [facility] [phone]  [facility] chiropractic [phone]  May be able to be billed as a chiropractic service depending on your insurance coverage.     Refer for Acupuncture.    Fax for [facility] TENs unit.      Interventions:  Discussed diagnostic genicular nerve block and possible RFA for L) knee pain. Handout provided.  Discussed diagnostic nerve blocks and possible RFA for L) hip pain. Handout provided.    Follow up:     As needed.    ELIZABETH Ochoa, RN, CNP, FNP  [facility]        BILLING TIME DOCUMENTATION:   The total TIME spent on this patient on the date of the encounter/appointment was 80 minutes.          Answers submitted by the patient for this visit:  JANUARY-7 (Submitted on 10/8/2023)  JANUARY 7 TOTAL SCORE: 6

## 2023-10-06 NOTE — TELEPHONE ENCOUNTER
I placed disability forms at [facility] desk.  Please fax, mail patient a copy, and scan into patient's chart.  Please also fax with this: copy of problem list/medications, discharge summary from 8/19/2017, as well as recent notes from rheumatology (9/21/23) ,cardiology (9/20/23), oncology (9/14/23), endocrinology 8/29/23)  Magda Stringer PA-C

## 2023-10-08 ASSESSMENT — ANXIETY QUESTIONNAIRES
GAD7 TOTAL SCORE: 6
1. FEELING NERVOUS, ANXIOUS, OR ON EDGE: NOT AT ALL
7. FEELING AFRAID AS IF SOMETHING AWFUL MIGHT HAPPEN: SEVERAL DAYS
4. TROUBLE RELAXING: SEVERAL DAYS
GAD7 TOTAL SCORE: 6
3. WORRYING TOO MUCH ABOUT DIFFERENT THINGS: SEVERAL DAYS
2. NOT BEING ABLE TO STOP OR CONTROL WORRYING: SEVERAL DAYS
5. BEING SO RESTLESS THAT IT IS HARD TO SIT STILL: SEVERAL DAYS
6. BECOMING EASILY ANNOYED OR IRRITABLE: SEVERAL DAYS

## 2023-10-08 ASSESSMENT — PAIN SCALES - PAIN ENJOYMENT GENERAL ACTIVITY SCALE (PEG)
INTERFERED_GENERAL_ACTIVITY: 7
INTERFERED_ENJOYMENT_LIFE: 7
AVG_PAIN_PASTWEEK: 4
PEG_TOTALSCORE: 6

## 2023-10-09 ENCOUNTER — OFFICE VISIT (OUTPATIENT)
Dept: PALLIATIVE MEDICINE | Facility: OTHER | Age: 63
End: 2023-10-09
Attending: INTERNAL MEDICINE
Payer: COMMERCIAL

## 2023-10-09 VITALS
DIASTOLIC BLOOD PRESSURE: 74 MMHG | HEART RATE: 80 BPM | BODY MASS INDEX: 23.15 KG/M2 | OXYGEN SATURATION: 99 % | SYSTOLIC BLOOD PRESSURE: 123 MMHG | WEIGHT: 107 LBS

## 2023-10-09 DIAGNOSIS — M15.9 OSTEOARTHRITIS OF MULTIPLE JOINTS, UNSPECIFIED OSTEOARTHRITIS TYPE: ICD-10-CM

## 2023-10-09 DIAGNOSIS — G89.29 CHRONIC INTRACTABLE PAIN: ICD-10-CM

## 2023-10-09 DIAGNOSIS — G89.29 CHRONIC PAIN OF LEFT KNEE: Primary | ICD-10-CM

## 2023-10-09 DIAGNOSIS — M25.562 CHRONIC PAIN OF LEFT KNEE: Primary | ICD-10-CM

## 2023-10-09 DIAGNOSIS — M25.552 HIP PAIN, LEFT: ICD-10-CM

## 2023-10-09 PROCEDURE — 99213 OFFICE O/P EST LOW 20 MIN: CPT | Performed by: NURSE PRACTITIONER

## 2023-10-09 PROCEDURE — 99205 OFFICE O/P NEW HI 60 MIN: CPT | Performed by: NURSE PRACTITIONER

## 2023-10-09 ASSESSMENT — PAIN SCALES - GENERAL: PAINLEVEL: SEVERE PAIN (6)

## 2023-10-09 NOTE — PATIENT INSTRUCTIONS
Plan:  A multimodal plan was developed today to treat your pain.  Multimodal analgesia is a strategy that reduces reliance on opioids through the use of non-opioid analgesics and therapies that have different mechanisms of action.      Diagnostics: L) knee imaging is at [facility].      Offered to obtain Lumbar MRI and she declined.    Medications:  The following OTC pain medications may be helpful, use as directed: Voltaren Gel 1%, CBD products, Capsaicin products, Australian Dream Cream, Epson It,  Arnica products, Lidocaine Patch, Solanpas, Biofreeze, Aspercream, Tiger Balm and Sumeet Emu cream.  Apply heat or cold PRN.      Therapies  Discussed anti-inflammatory lifestyle.    Discussed Pain Psychology  Psychological treatments are also important part of pain management.  Understanding and managing the thoughts, emotions and behaviors that accompany the discomfort can help you cope more effectively with your pain and can actually reduce the intensity of your pain.    Discussed Physical Therapy. Refer to Katiuska coles in [place] for L) hip and L) knee pain.  PHYSICAL THERAPY: Encourage patient to continue working with physical therapy.  Discussed the importance of core strengthening, ROM, stretching exercises with the patient and how each of these entities is important in decreasing pain.  Explained to the patient that the purpose of physical therapy is to teach the patient a home exercise program.  These exercises need to be performed every day in order to decrease pain and prevent future occurrences of pain.        Discussed Grounding Mat - handout provided  https://www.[employer].com/watch?v=MpTHXI3Ht3S    Discussed Frequency Specific Microcurrent - handout provided  Treatment for Neuropathic Pain.   [facility] [phone]  [facility] [phone]  May be able to be billed as a chiropractic service depending on your insurance coverage.     Refer for Acupuncture.    Fax for Z[employer]nex TENs  unit.      Interventions:  Discussed Genicular nerve block and possible RFA for L) knee pain. Handout provided.  Discussed diagnostic nerve blocks and possible RFA for L) hip pain. Handout provided.    Follow up:     As needed.    ELIZABETH Ochoa, RN, CNP, FNP  [facility]        Clinic Number:  [phone]   Call with any questions about your care and for scheduling assistance.   Calls are returned Monday through Friday between 8 AM and 4:30 PM. We usually get back to you within 2 business days depending on the issue/request.    If we are prescribing your medications:  For opioid medication refills, call the clinic or send a [employer] message 7 days in advance.  Please include:  Name of requested medication  Name of the pharmacy.  For non-opioid medications, call your pharmacy directly to request a refill. Please allow 3-4 days to be processed.   Per [place] State Law:  All controlled substance prescriptions must be filled within 30 days of being written.    For those controlled substances allowing refills, pickup must occur within 30 days of last fill.        We believe regular attendance is key to your success in our program!    Any time you are unable to keep your appointment we ask that you call us at least 24 hours in advance to cancel.This will allow us to offer the appointment time to another patient.   Multiple missed appointments may lead to dismissal from the clinic.

## 2023-10-09 NOTE — PROGRESS NOTES
Patient presents to the clinic today for a visit with ELIZABETH Tong CNP regarding Pain Management.        Medications-     QUESTIONS: Using CBD cream and softgels    [facility] Visit Facilitator

## 2023-10-11 DIAGNOSIS — Z95.4 S/P TVR (TRICUSPID VALVE REPLACEMENT): ICD-10-CM

## 2023-10-11 DIAGNOSIS — I34.0 SEVERE MITRAL INSUFFICIENCY: Primary | ICD-10-CM

## 2023-10-11 RX ORDER — SULFAMETHOXAZOLE/TRIMETHOPRIM 800-160 MG
1 TABLET ORAL 2 TIMES DAILY
Qty: 4 TABLET | Refills: 5 | Status: ON HOLD | OUTPATIENT
Start: 2023-10-11 | End: 2024-01-24

## 2023-10-20 ENCOUNTER — MYC MEDICAL ADVICE (OUTPATIENT)
Dept: CARDIOLOGY | Facility: CLINIC | Age: 63
End: 2023-10-20
Payer: COMMERCIAL

## 2023-10-20 DIAGNOSIS — I50.22 CHRONIC SYSTOLIC HEART FAILURE (H): Primary | ICD-10-CM

## 2023-10-20 RX ORDER — TORSEMIDE 20 MG/1
20 TABLET ORAL DAILY
Qty: 90 TABLET | Refills: 3 | Status: SHIPPED | OUTPATIENT
Start: 2023-10-20 | End: 2024-10-02

## 2023-10-31 ENCOUNTER — TELEPHONE (OUTPATIENT)
Dept: CARDIOLOGY | Facility: CLINIC | Age: 63
End: 2023-10-31
Payer: COMMERCIAL

## 2023-10-31 NOTE — TELEPHONE ENCOUNTER
[facility] Call Center    Phone Message    May a detailed message be left on voicemail: yes     Reason for Call: Other: Christopher states she dropped off papers on 10/24 and wants to know if they were received and signed, please reach out to Christopher to further discuss.     Action Taken: Other: Cardiology    Travel Screening: Not Applicable    Thank you!  [facility]

## 2023-10-31 NOTE — TELEPHONE ENCOUNTER
Received paperwork, will fill out with Dr Eaton and send in this week to [facility] Fax [phone]

## 2023-11-08 ENCOUNTER — ANCILLARY PROCEDURE (OUTPATIENT)
Dept: CARDIOLOGY | Facility: CLINIC | Age: 63
End: 2023-11-08
Attending: INTERNAL MEDICINE
Payer: COMMERCIAL

## 2023-11-08 DIAGNOSIS — R00.1 BRADYCARDIA: ICD-10-CM

## 2023-11-08 PROCEDURE — 93294 REM INTERROG EVL PM/LDLS PM: CPT | Performed by: INTERNAL MEDICINE

## 2023-11-08 PROCEDURE — 93296 REM INTERROG EVL PM/IDS: CPT

## 2023-11-10 NOTE — TELEPHONE ENCOUNTER
Called Amelia to check in. Seen in clinic last week and stopped Lisinopril, did a 3 day increase of Lasix. She reports feeling better. Weight went down 1.5 lbs for about a day. Otherwise has been close to where she was at clinic visit around 118 but the swelling in legs has improved.   She reports her BP yesterday was the highest yet since stopping Lisinopril - 124/84 and she didn't like that. Will continue to monitor BP.    She thought she remembered discussion of rechecking labs for sodium level and will verify with provider.     Pao Alston RN    Onset: this morning- chills-   Location / description: no redness, swelling, or pain at access site of stents.    Precipitating Factors: none  Pain Scale (1 - 10), 10 highest: 0  Associated Symptoms: none  What  improves / worsens symptoms: nothing  Symptom specific medications: has Tylenol available but has not taken it.   Recent Care: 11-8-23 with Dr. Bradshaw.      Pt advised to report to UC/ER if fever persists or if any new symptoms develop.  Pt advised to error on the side of caution s/t recent MI with stent placement.  Pt states understanding and is agreeable.

## 2023-11-17 LAB
MDC_IDC_EPISODE_DTM: NORMAL
MDC_IDC_EPISODE_DURATION: 1 S
MDC_IDC_EPISODE_DURATION: 1105 S
MDC_IDC_EPISODE_DURATION: 1458 S
MDC_IDC_EPISODE_DURATION: 1661 S
MDC_IDC_EPISODE_DURATION: 180 S
MDC_IDC_EPISODE_DURATION: 181 S
MDC_IDC_EPISODE_DURATION: 182 S
MDC_IDC_EPISODE_DURATION: 185 S
MDC_IDC_EPISODE_DURATION: 186 S
MDC_IDC_EPISODE_DURATION: 190 S
MDC_IDC_EPISODE_DURATION: 192 S
MDC_IDC_EPISODE_DURATION: 195 S
MDC_IDC_EPISODE_DURATION: 199 S
MDC_IDC_EPISODE_DURATION: 2 S
MDC_IDC_EPISODE_DURATION: 206 S
MDC_IDC_EPISODE_DURATION: 2230 S
MDC_IDC_EPISODE_DURATION: 231 S
MDC_IDC_EPISODE_DURATION: 2390 S
MDC_IDC_EPISODE_DURATION: 259 S
MDC_IDC_EPISODE_DURATION: 2862 S
MDC_IDC_EPISODE_DURATION: 293 S
MDC_IDC_EPISODE_DURATION: 302 S
MDC_IDC_EPISODE_DURATION: 358 S
MDC_IDC_EPISODE_DURATION: 398 S
MDC_IDC_EPISODE_DURATION: 407 S
MDC_IDC_EPISODE_DURATION: 424 S
MDC_IDC_EPISODE_DURATION: 453 S
MDC_IDC_EPISODE_DURATION: 5 S
MDC_IDC_EPISODE_DURATION: 628 S
MDC_IDC_EPISODE_DURATION: 6874 S
MDC_IDC_EPISODE_DURATION: 7 S
MDC_IDC_EPISODE_DURATION: 8660 S
MDC_IDC_EPISODE_DURATION: 8747 S
MDC_IDC_EPISODE_DURATION: NORMAL S
MDC_IDC_EPISODE_ID: NORMAL
MDC_IDC_EPISODE_TYPE: NORMAL
MDC_IDC_LEAD_CONNECTION_STATUS: NORMAL
MDC_IDC_LEAD_CONNECTION_STATUS: NORMAL
MDC_IDC_LEAD_IMPLANT_DT: NORMAL
MDC_IDC_LEAD_IMPLANT_DT: NORMAL
MDC_IDC_LEAD_LOCATION: NORMAL
MDC_IDC_LEAD_LOCATION: NORMAL
MDC_IDC_LEAD_LOCATION_DETAIL_1: NORMAL
MDC_IDC_LEAD_LOCATION_DETAIL_1: NORMAL
MDC_IDC_LEAD_MFG: NORMAL
MDC_IDC_LEAD_MFG: NORMAL
MDC_IDC_LEAD_MODEL: NORMAL
MDC_IDC_LEAD_MODEL: NORMAL
MDC_IDC_LEAD_POLARITY_TYPE: NORMAL
MDC_IDC_LEAD_POLARITY_TYPE: NORMAL
MDC_IDC_LEAD_SERIAL: NORMAL
MDC_IDC_LEAD_SERIAL: NORMAL
MDC_IDC_LEAD_SPECIAL_FUNCTION: NORMAL
MDC_IDC_LEAD_SPECIAL_FUNCTION: NORMAL
MDC_IDC_MSMT_BATTERY_DTM: NORMAL
MDC_IDC_MSMT_BATTERY_REMAINING_LONGEVITY: 120 MO
MDC_IDC_MSMT_BATTERY_RRT_TRIGGER: 2.62
MDC_IDC_MSMT_BATTERY_STATUS: NORMAL
MDC_IDC_MSMT_BATTERY_VOLTAGE: 3 V
MDC_IDC_MSMT_LEADCHNL_RA_IMPEDANCE_VALUE: 304 OHM
MDC_IDC_MSMT_LEADCHNL_RA_IMPEDANCE_VALUE: 399 OHM
MDC_IDC_MSMT_LEADCHNL_RA_PACING_THRESHOLD_AMPLITUDE: 0.5 V
MDC_IDC_MSMT_LEADCHNL_RA_PACING_THRESHOLD_PULSEWIDTH: 0.4 MS
MDC_IDC_MSMT_LEADCHNL_RA_SENSING_INTR_AMPL: 0.62 MV
MDC_IDC_MSMT_LEADCHNL_RV_IMPEDANCE_VALUE: 418 OHM
MDC_IDC_MSMT_LEADCHNL_RV_IMPEDANCE_VALUE: 513 OHM
MDC_IDC_MSMT_LEADCHNL_RV_PACING_THRESHOLD_AMPLITUDE: 0.62 V
MDC_IDC_MSMT_LEADCHNL_RV_PACING_THRESHOLD_PULSEWIDTH: 0.4 MS
MDC_IDC_MSMT_LEADCHNL_RV_SENSING_INTR_AMPL: 18.62 MV
MDC_IDC_PG_IMPLANT_DTM: NORMAL
MDC_IDC_PG_MFG: NORMAL
MDC_IDC_PG_MODEL: NORMAL
MDC_IDC_PG_SERIAL: NORMAL
MDC_IDC_PG_TYPE: NORMAL
MDC_IDC_SESS_CLINIC_NAME: NORMAL
MDC_IDC_SESS_DTM: NORMAL
MDC_IDC_SESS_TYPE: NORMAL
MDC_IDC_SET_BRADY_AT_MODE_SWITCH_RATE: 171 {BEATS}/MIN
MDC_IDC_SET_BRADY_HYSTRATE: NORMAL
MDC_IDC_SET_BRADY_LOWRATE: 75 {BEATS}/MIN
MDC_IDC_SET_BRADY_MAX_SENSOR_RATE: 130 {BEATS}/MIN
MDC_IDC_SET_BRADY_MAX_TRACKING_RATE: 130 {BEATS}/MIN
MDC_IDC_SET_BRADY_MODE: NORMAL
MDC_IDC_SET_BRADY_PAV_DELAY_LOW: 180 MS
MDC_IDC_SET_BRADY_SAV_DELAY_LOW: 150 MS
MDC_IDC_SET_LEADCHNL_RA_PACING_AMPLITUDE: 1.5 V
MDC_IDC_SET_LEADCHNL_RA_PACING_ANODE_ELECTRODE_1: NORMAL
MDC_IDC_SET_LEADCHNL_RA_PACING_ANODE_LOCATION_1: NORMAL
MDC_IDC_SET_LEADCHNL_RA_PACING_CAPTURE_MODE: NORMAL
MDC_IDC_SET_LEADCHNL_RA_PACING_CATHODE_ELECTRODE_1: NORMAL
MDC_IDC_SET_LEADCHNL_RA_PACING_CATHODE_LOCATION_1: NORMAL
MDC_IDC_SET_LEADCHNL_RA_PACING_POLARITY: NORMAL
MDC_IDC_SET_LEADCHNL_RA_PACING_PULSEWIDTH: 0.4 MS
MDC_IDC_SET_LEADCHNL_RA_SENSING_ANODE_ELECTRODE_1: NORMAL
MDC_IDC_SET_LEADCHNL_RA_SENSING_ANODE_LOCATION_1: NORMAL
MDC_IDC_SET_LEADCHNL_RA_SENSING_CATHODE_ELECTRODE_1: NORMAL
MDC_IDC_SET_LEADCHNL_RA_SENSING_CATHODE_LOCATION_1: NORMAL
MDC_IDC_SET_LEADCHNL_RA_SENSING_POLARITY: NORMAL
MDC_IDC_SET_LEADCHNL_RA_SENSING_SENSITIVITY: 0.3 MV
MDC_IDC_SET_LEADCHNL_RV_PACING_AMPLITUDE: 2 V
MDC_IDC_SET_LEADCHNL_RV_PACING_ANODE_ELECTRODE_1: NORMAL
MDC_IDC_SET_LEADCHNL_RV_PACING_ANODE_LOCATION_1: NORMAL
MDC_IDC_SET_LEADCHNL_RV_PACING_CAPTURE_MODE: NORMAL
MDC_IDC_SET_LEADCHNL_RV_PACING_CATHODE_ELECTRODE_1: NORMAL
MDC_IDC_SET_LEADCHNL_RV_PACING_CATHODE_LOCATION_1: NORMAL
MDC_IDC_SET_LEADCHNL_RV_PACING_POLARITY: NORMAL
MDC_IDC_SET_LEADCHNL_RV_PACING_PULSEWIDTH: 0.4 MS
MDC_IDC_SET_LEADCHNL_RV_SENSING_ANODE_ELECTRODE_1: NORMAL
MDC_IDC_SET_LEADCHNL_RV_SENSING_ANODE_LOCATION_1: NORMAL
MDC_IDC_SET_LEADCHNL_RV_SENSING_CATHODE_ELECTRODE_1: NORMAL
MDC_IDC_SET_LEADCHNL_RV_SENSING_CATHODE_LOCATION_1: NORMAL
MDC_IDC_SET_LEADCHNL_RV_SENSING_POLARITY: NORMAL
MDC_IDC_SET_LEADCHNL_RV_SENSING_SENSITIVITY: 0.9 MV
MDC_IDC_SET_ZONE_DETECTION_INTERVAL: 350 MS
MDC_IDC_SET_ZONE_DETECTION_INTERVAL: 400 MS
MDC_IDC_SET_ZONE_STATUS: NORMAL
MDC_IDC_SET_ZONE_STATUS: NORMAL
MDC_IDC_SET_ZONE_TYPE: NORMAL
MDC_IDC_SET_ZONE_VENDOR_TYPE: NORMAL
MDC_IDC_STAT_AT_BURDEN_PERCENT: 7 %
MDC_IDC_STAT_AT_DTM_END: NORMAL
MDC_IDC_STAT_AT_DTM_START: NORMAL
MDC_IDC_STAT_BRADY_AP_VP_PERCENT: 88.06 %
MDC_IDC_STAT_BRADY_AP_VS_PERCENT: 0.12 %
MDC_IDC_STAT_BRADY_AS_VP_PERCENT: 10.12 %
MDC_IDC_STAT_BRADY_AS_VS_PERCENT: 1.71 %
MDC_IDC_STAT_BRADY_DTM_END: NORMAL
MDC_IDC_STAT_BRADY_DTM_START: NORMAL
MDC_IDC_STAT_BRADY_RA_PERCENT_PACED: 82.81 %
MDC_IDC_STAT_BRADY_RV_PERCENT_PACED: 98.11 %
MDC_IDC_STAT_EPISODE_RECENT_COUNT: 0
MDC_IDC_STAT_EPISODE_RECENT_COUNT: 0
MDC_IDC_STAT_EPISODE_RECENT_COUNT: 2
MDC_IDC_STAT_EPISODE_RECENT_COUNT: 202
MDC_IDC_STAT_EPISODE_RECENT_COUNT: 8
MDC_IDC_STAT_EPISODE_RECENT_COUNT_DTM_END: NORMAL
MDC_IDC_STAT_EPISODE_RECENT_COUNT_DTM_START: NORMAL
MDC_IDC_STAT_EPISODE_TOTAL_COUNT: 0
MDC_IDC_STAT_EPISODE_TOTAL_COUNT: 400
MDC_IDC_STAT_EPISODE_TOTAL_COUNT: 5061
MDC_IDC_STAT_EPISODE_TOTAL_COUNT: 815
MDC_IDC_STAT_EPISODE_TOTAL_COUNT: NORMAL
MDC_IDC_STAT_EPISODE_TOTAL_COUNT_DTM_END: NORMAL
MDC_IDC_STAT_EPISODE_TOTAL_COUNT_DTM_START: NORMAL
MDC_IDC_STAT_EPISODE_TYPE: NORMAL
MDC_IDC_STAT_TACHYTHERAPY_RECENT_DTM_END: NORMAL
MDC_IDC_STAT_TACHYTHERAPY_RECENT_DTM_START: NORMAL
MDC_IDC_STAT_TACHYTHERAPY_TOTAL_DTM_END: NORMAL
MDC_IDC_STAT_TACHYTHERAPY_TOTAL_DTM_START: NORMAL

## 2023-12-07 NOTE — TELEPHONE ENCOUNTER
Called patient and left message that her detrol refill is in her chart only detrol. Loretta Ramirez, MAURICIO Staff Nurse     Juan

## 2023-12-08 ENCOUNTER — TRANSFERRED RECORDS (OUTPATIENT)
Dept: HEALTH INFORMATION MANAGEMENT | Facility: CLINIC | Age: 63
End: 2023-12-08
Payer: COMMERCIAL

## 2023-12-11 ENCOUNTER — HOSPITAL ENCOUNTER (OUTPATIENT)
Dept: MAMMOGRAPHY | Facility: CLINIC | Age: 63
Discharge: HOME | End: 2023-12-11
Attending: PHYSICIAN ASSISTANT | Admitting: PHYSICIAN ASSISTANT
Payer: COMMERCIAL

## 2023-12-11 DIAGNOSIS — Z12.31 VISIT FOR SCREENING MAMMOGRAM: ICD-10-CM

## 2023-12-11 PROCEDURE — 77067 SCR MAMMO BI INCL CAD: CPT

## 2023-12-12 ENCOUNTER — TELEPHONE (OUTPATIENT)
Dept: CARDIOLOGY | Facility: CLINIC | Age: 63
End: 2023-12-12
Payer: COMMERCIAL

## 2023-12-12 NOTE — TELEPHONE ENCOUNTER
Health Call Center    Phone Message    May a detailed message be left on voicemail: yes     Reason for Call: Other: Amelia called requesting a call back from her CORE team or Kandy if possible to discuss a pre-op physical prior to her having hip surgery in January. Please reach out to Amelia to discuss. Thank you!     Action Taken: Other: Cardiology    Travel Screening: Not Applicable    Thank you!  [facility]

## 2023-12-13 NOTE — TELEPHONE ENCOUNTER
Amelia is scheduled for cardiac clearance on 1/11 for hip surgery on 1/24.  She wants to keep her CORE appt in February just in case.

## 2023-12-20 ENCOUNTER — MYC MEDICAL ADVICE (OUTPATIENT)
Dept: CARDIOLOGY | Facility: CLINIC | Age: 63
End: 2023-12-20
Payer: COMMERCIAL

## 2023-12-20 DIAGNOSIS — I50.33 ACUTE ON CHRONIC DIASTOLIC CONGESTIVE HEART FAILURE (H): ICD-10-CM

## 2023-12-20 ASSESSMENT — ENCOUNTER SYMPTOMS
ARTHRALGIAS: 1
HEARTBURN: 0
CONSTIPATION: 1
MYALGIAS: 1
NAUSEA: 0
DYSURIA: 0
EYE PAIN: 0
COUGH: 0
JOINT SWELLING: 1
HEMATOCHEZIA: 0
SORE THROAT: 0
PARESTHESIAS: 0
PALPITATIONS: 0
ABDOMINAL PAIN: 0
HEADACHES: 0
SHORTNESS OF BREATH: 0
FREQUENCY: 1
DIZZINESS: 1
BREAST MASS: 0
FEVER: 0
WEAKNESS: 1
CHILLS: 1
HEMATURIA: 0
NERVOUS/ANXIOUS: 0
DIARRHEA: 0

## 2023-12-20 ASSESSMENT — ACTIVITIES OF DAILY LIVING (ADL): CURRENT_FUNCTION: MONEY MANAGEMENT REQUIRES ASSISTANCE

## 2023-12-22 ENCOUNTER — OFFICE VISIT (OUTPATIENT)
Dept: FAMILY MEDICINE | Facility: CLINIC | Age: 63
End: 2023-12-22
Attending: PHYSICIAN ASSISTANT
Payer: COMMERCIAL

## 2023-12-22 VITALS
DIASTOLIC BLOOD PRESSURE: 78 MMHG | RESPIRATION RATE: 16 BRPM | HEART RATE: 95 BPM | HEIGHT: 57 IN | BODY MASS INDEX: 22.93 KG/M2 | SYSTOLIC BLOOD PRESSURE: 116 MMHG | TEMPERATURE: 97.4 F | OXYGEN SATURATION: 98 % | WEIGHT: 106.3 LBS

## 2023-12-22 DIAGNOSIS — R73.03 PREDIABETES: ICD-10-CM

## 2023-12-22 DIAGNOSIS — I27.0 PRIMARY PULMONARY HYPERTENSION (H): ICD-10-CM

## 2023-12-22 DIAGNOSIS — Q76.6 ANOMALY OF RIB: ICD-10-CM

## 2023-12-22 DIAGNOSIS — R29.6 FALLS FREQUENTLY: ICD-10-CM

## 2023-12-22 DIAGNOSIS — M06.9 RHEUMATOID ARTHRITIS OF BOTH ANKLES, UNSPECIFIED WHETHER RHEUMATOID FACTOR PRESENT (H): Chronic | ICD-10-CM

## 2023-12-22 DIAGNOSIS — I10 PRIMARY HYPERTENSION: Chronic | ICD-10-CM

## 2023-12-22 DIAGNOSIS — I89.0 LYMPHEDEMA OF BOTH LOWER EXTREMITIES: ICD-10-CM

## 2023-12-22 DIAGNOSIS — I50.31 ACUTE HEART FAILURE WITH PRESERVED EJECTION FRACTION (HFPEF) (H): ICD-10-CM

## 2023-12-22 DIAGNOSIS — I74.9 EMBOLISM AND THROMBOSIS OF UNSPECIFIED ARTERY (H): Chronic | ICD-10-CM

## 2023-12-22 DIAGNOSIS — N18.30 STAGE 3 CHRONIC KIDNEY DISEASE, UNSPECIFIED WHETHER STAGE 3A OR 3B CKD (H): ICD-10-CM

## 2023-12-22 DIAGNOSIS — I50.33 ACUTE ON CHRONIC DIASTOLIC HEART FAILURE (H): ICD-10-CM

## 2023-12-22 DIAGNOSIS — I50.30 HEART FAILURE WITH PRESERVED EJECTION FRACTION, NYHA CLASS I (H): ICD-10-CM

## 2023-12-22 DIAGNOSIS — D69.6 THROMBOCYTOPENIA, UNSPECIFIED (H): ICD-10-CM

## 2023-12-22 DIAGNOSIS — I48.92 ATRIAL FLUTTER, UNSPECIFIED TYPE (H): ICD-10-CM

## 2023-12-22 DIAGNOSIS — C83.398 DIFFUSE LARGE B-CELL LYMPHOMA OF EXTRANODAL SITE: ICD-10-CM

## 2023-12-22 DIAGNOSIS — C83.30 DIFFUSE LARGE B-CELL LYMPHOMA, UNSPECIFIED BODY REGION (H): ICD-10-CM

## 2023-12-22 DIAGNOSIS — I48.0 PAROXYSMAL ATRIAL FIBRILLATION (H): ICD-10-CM

## 2023-12-22 DIAGNOSIS — R57.0 CARDIOGENIC SHOCK (H): ICD-10-CM

## 2023-12-22 DIAGNOSIS — Z00.00 ENCOUNTER FOR MEDICARE ANNUAL WELLNESS EXAM: Primary | ICD-10-CM

## 2023-12-22 DIAGNOSIS — E78.5 HYPERLIPIDEMIA LDL GOAL <130: Chronic | ICD-10-CM

## 2023-12-22 LAB
ANION GAP SERPL CALCULATED.3IONS-SCNC: 13 MMOL/L (ref 7–15)
BASOPHILS # BLD AUTO: 0 10E3/UL (ref 0–0.2)
BASOPHILS NFR BLD AUTO: 1 %
BUN SERPL-MCNC: 29 MG/DL (ref 8–23)
CALCIUM SERPL-MCNC: 9.2 MG/DL (ref 8.8–10.2)
CHLORIDE SERPL-SCNC: 96 MMOL/L (ref 98–107)
CHOLEST SERPL-MCNC: 148 MG/DL
CREAT SERPL-MCNC: 0.9 MG/DL (ref 0.51–1.17)
CREAT UR-MCNC: 20.9 MG/DL
DEPRECATED HCO3 PLAS-SCNC: 24 MMOL/L (ref 22–29)
EGFRCR SERPLBLD CKD-EPI 2021: 71 ML/MIN/1.73M2
EOSINOPHIL # BLD AUTO: 0 10E3/UL (ref 0–0.7)
EOSINOPHIL NFR BLD AUTO: 1 %
ERYTHROCYTE [DISTWIDTH] IN BLOOD BY AUTOMATED COUNT: 11.4 % (ref 10–15)
FASTING STATUS PATIENT QL REPORTED: YES
GLUCOSE SERPL-MCNC: 109 MG/DL (ref 70–99)
HBA1C MFR BLD: 5.1 % (ref 0–5.6)
HCT VFR BLD AUTO: 41.6 % (ref 35–53)
HDLC SERPL-MCNC: 52 MG/DL
HGB BLD-MCNC: 14.2 G/DL (ref 11.7–17.7)
IMM GRANULOCYTES # BLD: 0 10E3/UL
IMM GRANULOCYTES NFR BLD: 0 %
LDLC SERPL CALC-MCNC: 78 MG/DL
LYMPHOCYTES # BLD AUTO: 0.5 10E3/UL (ref 0.8–5.3)
LYMPHOCYTES NFR BLD AUTO: 8 %
MCH RBC QN AUTO: 34.9 PG (ref 26.5–33)
MCHC RBC AUTO-ENTMCNC: 34.1 G/DL (ref 31.5–36.5)
MCV RBC AUTO: 102 FL (ref 78–100)
MICROALBUMIN UR-MCNC: <12 MG/L
MICROALBUMIN/CREAT UR: NORMAL MG/G{CREAT}
MONOCYTES # BLD AUTO: 0.3 10E3/UL (ref 0–1.3)
MONOCYTES NFR BLD AUTO: 5 %
NEUTROPHILS # BLD AUTO: 5.2 10E3/UL (ref 1.6–8.3)
NEUTROPHILS NFR BLD AUTO: 86 %
NONHDLC SERPL-MCNC: 96 MG/DL
PLATELET # BLD AUTO: 169 10E3/UL (ref 150–450)
POTASSIUM SERPL-SCNC: 4.4 MMOL/L (ref 3.4–5.3)
RBC # BLD AUTO: 4.07 10E6/UL (ref 3.8–5.9)
SODIUM SERPL-SCNC: 133 MMOL/L (ref 135–145)
TRIGL SERPL-MCNC: 90 MG/DL
WBC # BLD AUTO: 6 10E3/UL (ref 4–11)

## 2023-12-22 PROCEDURE — 80048 BASIC METABOLIC PNL TOTAL CA: CPT | Performed by: PHYSICIAN ASSISTANT

## 2023-12-22 PROCEDURE — 99214 OFFICE O/P EST MOD 30 MIN: CPT | Mod: 25 | Performed by: PHYSICIAN ASSISTANT

## 2023-12-22 PROCEDURE — 82043 UR ALBUMIN QUANTITATIVE: CPT | Performed by: PHYSICIAN ASSISTANT

## 2023-12-22 PROCEDURE — 80061 LIPID PANEL: CPT | Performed by: PHYSICIAN ASSISTANT

## 2023-12-22 PROCEDURE — 83036 HEMOGLOBIN GLYCOSYLATED A1C: CPT | Performed by: PHYSICIAN ASSISTANT

## 2023-12-22 PROCEDURE — 85025 COMPLETE CBC W/AUTO DIFF WBC: CPT | Performed by: PHYSICIAN ASSISTANT

## 2023-12-22 PROCEDURE — 99396 PREV VISIT EST AGE 40-64: CPT | Performed by: PHYSICIAN ASSISTANT

## 2023-12-22 PROCEDURE — 36415 COLL VENOUS BLD VENIPUNCTURE: CPT | Performed by: PHYSICIAN ASSISTANT

## 2023-12-22 PROCEDURE — 82570 ASSAY OF URINE CREATININE: CPT | Performed by: PHYSICIAN ASSISTANT

## 2023-12-22 RX ORDER — GABAPENTIN 100 MG/1
CAPSULE ORAL
Qty: 360 CAPSULE | Refills: 3 | Status: ON HOLD | OUTPATIENT
Start: 2023-12-22 | End: 2024-01-24

## 2023-12-22 RX ORDER — RESPIRATORY SYNCYTIAL VIRUS VACCINE 120MCG/0.5
0.5 KIT INTRAMUSCULAR ONCE
Qty: 1 EACH | Refills: 0 | Status: CANCELLED | OUTPATIENT
Start: 2023-12-22 | End: 2023-12-22

## 2023-12-22 RX ORDER — ZOLEDRONIC ACID 5 MG/100ML
INJECTION, SOLUTION INTRAVENOUS
Status: ON HOLD | COMMUNITY
Start: 2023-09-25 | End: 2024-01-24

## 2023-12-22 ASSESSMENT — ENCOUNTER SYMPTOMS
FEVER: 0
CHILLS: 1
ABDOMINAL PAIN: 0
MYALGIAS: 1
PALPITATIONS: 0
JOINT SWELLING: 1
COUGH: 0
DIARRHEA: 0
DYSURIA: 0
CONSTIPATION: 1
SORE THROAT: 0
WEAKNESS: 1
DIZZINESS: 1
SHORTNESS OF BREATH: 0
ARTHRALGIAS: 1
NERVOUS/ANXIOUS: 0
EYE PAIN: 0
NAUSEA: 0
FREQUENCY: 1
HEADACHES: 0
HEMATURIA: 0

## 2023-12-22 ASSESSMENT — ACTIVITIES OF DAILY LIVING (ADL): CURRENT_FUNCTION: MONEY MANAGEMENT REQUIRES ASSISTANCE

## 2023-12-22 NOTE — PROGRESS NOTES
"   SUBJECTIVE:   Amelia is a 63 year old, presenting for the following:  Physical        12/22/2023    10:49 AM   Additional Questions   Roomed by Coleen   Accompanied by Self       Healthy Habits:     In general, how would you rate your overall health?  Fair    Frequency of exercise:  None    Do you usually eat at least 4 servings of fruit and vegetables a day, include whole grains    & fiber and avoid regularly eating high fat or \"junk\" foods?  Yes    Taking medications regularly:  Yes    Medication side effects:  Not applicable    Ability to successfully perform activities of daily living:  Money management requires assistance    Home Safety:  No safety concerns identified    Hearing Impairment:  Need to ask people to speak up or repeat themselves    In the past 6 months, have you been bothered by leaking of urine? Yes    In general, how would you rate your overall mental or emotional health?  Fair    Additional concerns today:  Yes      Needing order for compression stockings  Refill on gabapentin        Social History     Tobacco Use    Smoking status: Never    Smokeless tobacco: Never   Substance Use Topics    Alcohol use: Yes     Comment: none             12/20/2023    10:46 AM   Alcohol Use   Prescreen: >3 drinks/day or >7 drinks/week? No          No data to display              Reviewed orders with patient.  Reviewed health maintenance and updated orders accordingly - Yes  Lab work is in process  Labs reviewed in EPIC  BP Readings from Last 3 Encounters:   12/22/23 116/78   10/09/23 123/74   09/25/23 108/64    Wt Readings from Last 3 Encounters:   12/22/23 48.2 kg (106 lb 4.8 oz)   10/09/23 48.5 kg (107 lb)   09/25/23 48.6 kg (107 lb 3.2 oz)                  Patient Active Problem List   Diagnosis    HTN (hypertension)    Primary pulmonary hypertension (H)    Hyperlipidemia LDL goal <130    RA (rheumatoid arthritis) (H)    Lymphedema of both lower extremities    Atrial tachycardia    Cardiogenic shock " (H)    Critical illness myopathy    Diffuse large B-cell lymphoma of extranodal site (H)    Diffuse large B cell lymphoma (H)    Febrile neutropenia     Physical deconditioning    DLBCL (diffuse large B cell lymphoma) (H)    H/O cardiac arrest    Paroxysmal atrial fibrillation (H)    Atrial flutter, unspecified type (H)    Cervical cancer screening    Mild protein-calorie malnutrition (H24)    Embolism and thrombosis of unspecified artery (H)    Thrombocytopenia, unspecified (H24)    Heart palpitations    Bradycardia    CHF (congestive heart failure) (H)    Chronic kidney disease, stage 3    Iatrogenic cushingoid features (H24)    Steroid-induced osteoporosis    Hyponatremia    Acute on chronic diastolic heart failure (H)    Severe tricuspid regurgitation    Hypervolemia, unspecified hypervolemia type    Edema, unspecified type    Heart failure with preserved ejection fraction, NYHA class I (H)    S/P TVR (tricuspid valve replacement)    Falls frequently    Prediabetes     Past Surgical History:   Procedure Laterality Date    ANESTHESIA CARDIOVERSION N/A 5/17/2017    Procedure: ANESTHESIA CARDIOVERSION;  ANESTHESIA CARDIOVERSION;  Surgeon: GENERIC ANESTHESIA PROVIDER;  Location: [facility]    ANESTHESIA CARDIOVERSION  3/12/2018    Procedure: ANESTHESIA CARDIOVERSION;;  Surgeon: GENERIC ANESTHESIA PROVIDER;  [facility]: [facility]    ANESTHESIA CARDIOVERSION N/A 3/23/2018    Procedure: ANESTHESIA CARDIOVERSION;  Anesthesia Cardioversion ;  Surgeon: GENERIC ANESTHESIA PROVIDER;  [facility]: [facility]    ANESTHESIA CARDIOVERSION N/A 4/12/2018    Procedure: ANESTHESIA CARDIOVERSION;  Cardioversion;  Surgeon: GENERIC ANESTHESIA PROVIDER;  Location: [facility]    ARTHRODESIS WRIST  2000    Right wrist    BONE MARROW ASPIRATE &BIOPSY  7/12/2017         BONE MARROW BIOPSY W/ ASPIRATION  07/12/2017    CARDIOVERSION      5/17/17, 3/12/18, 3/23/18, 4/14/18,     COLONOSCOPY N/A 11/19/2019    Procedure: Colonoscopy, With Polypectomy And Biopsy;   Surgeon: Pj Vasques MD;  Location: [facility]    CV CORONARY ANGIOGRAM N/A 11/28/2022    Procedure: Coronary Angiogram;  Surgeon: Sundar Wood MD;  Location:  HEART CARDIAC CATH LAB    CV RIGHT HEART CATH MEASUREMENTS RECORDED N/A 11/28/2022    Procedure: Right Heart Catheterization;  Surgeon: Sundar Wood MD;  Location: [facility] CARDIAC CATH LAB    EP ABLATION PVC N/A 12/1/2022    Procedure: Ablation Premature Ventricular Contractions;  Surgeon: Juan Eaton MD;  Location: [facility] CARDIAC CATH LAB    EP PACEMAKER N/A 12/13/2019    Procedure: EP Pacemaker;  Surgeon: Pj Trent MD;  Location: [facility] CARDIAC CATH LAB    EXCISE LESION UPPER EXTREMITY Left 5/22/2018    Procedure: EXCISE LESION UPPER EXTREMITY;  Left Arm Wound Excision And Closure, Possible Submuscular Transposition of Ulnar Nerve  (Choice Anesthesia);  Surgeon: Kevin Sheldon MD;  Location:  OR    FOOT SURGERY      4 left and 2 right    FOOT SURGERY      4 Left and 2 Right     IMPLANT PACEMAKER  12/13/2019    Dr China Trent  [facility] Cardiac Cath lab     IMPLANT PACEMAKER      RELEASE CARPAL TUNNEL Bilateral     RELEASE CARPAL TUNNEL BILATERAL      REPAIR VALVE TRICUSPID MINIMALLY INVASIVE N/A 4/10/2023    Procedure: MINIMALLY INVASIVE TRICUSPID VALVE REPLACEMENT USING 29MM ST. NILAM EPIC VALVE, FEMORAL CANNULATION AND RIGHT GROIN CUTDOWN, CARDIOPULMONARY BYPASS, TRANSESOPHAGEAL ECHOCARDIOGRAM PERFORMED BY ANESTHESIA STAFF;  Surgeon: Chilango Cope MD;  Location:  OR    REPAIR VENTRICULAR SEPTAL DEFECT  1969    TRANSPOSITION ULNAR NERVE (ELBOW) Left 5/22/2018    Procedure: TRANSPOSITION ULNAR NERVE (ELBOW);;  Surgeon: Kevin Sheldon MD;  Location:  OR    ULNAR NERVE TRANSPOSITION Left 05/22/2018    VSD REPAIR  1969    ZZC FUSION FOOT BONES,SUBTALAR Right 10/20/2020    Procedure: RIGHT SUBTALAR JOINT FUSION AND CALCANEOCUBOID FUSION;  Surgeon: Nemesio Crow MD;  Location: [facility];   Service: Orthopedics       Social History     Tobacco Use    Smoking status: Never    Smokeless tobacco: Never   Substance Use Topics    Alcohol use: Yes     Comment: none     Family History   Problem Relation Age of Onset    Breast Cancer Mother         60s    Hypertension Mother     Alzheimer Disease Mother     Cerebrovascular Disease Mother     Hypertension Father     Cerebrovascular Disease Father     Atrial fibrillation Father     Lymphoma Father     Prostate Cancer Father     Other Cancer Father         some form of Lymphoma    Alzheimer Disease Maternal Grandmother         likely    Arthritis Maternal Grandfather     Pneumonia Maternal Grandfather     Diabetes Paternal Grandmother     Mental Illness Sister         early onset  alzheimers           Breast Cancer Screening:    FHS-7:       11/24/2021    10:09 AM 12/10/2022    11:17 AM 12/11/2023    11:28 AM   Breast CA Risk Assessment (FHS-7)   Did any of your first-degree relatives have breast or ovarian cancer? Yes Yes Yes   Did any of your relatives have bilateral breast cancer? No No No   Did any man in your family have breast cancer? No No No   Did any woman in your family have breast and ovarian cancer? No No No   Did any woman in your family have breast cancer before age 50 y? No No No   Do you have 2 or more relatives with breast and/or ovarian cancer? No No No   Do you have 2 or more relatives with breast and/or bowel cancer? No No No       Mammogram Screening: Recommended mammography every 1-2 years with patient discussion and risk factor consideration  Pertinent mammograms are reviewed under the imaging tab.    History of abnormal Pap smear: NO - age 30-65 PAP every 5 years with negative HPV co-testing recommended      Latest Ref Rng & Units 10/15/2021    12:11 PM 10/4/2018     2:14 PM 10/4/2018     2:13 PM   PAP / HPV   PAP  Negative for Intraepithelial Lesion or Malignancy (NILM)      PAP (Historical)   NIL     HPV 16 DNA Negative Negative    Negative    HPV 18 DNA Negative Negative   Negative    Other HR HPV Negative Negative   Negative      Reviewed and updated as needed this visit by clinical staff   Tobacco  Allergies  Meds  Problems  Med Hx  Surg Hx  Fam Hx          Reviewed and updated as needed this visit by Provider   Tobacco  Allergies  Meds  Problems  Med Hx  Surg Hx  Fam Hx         Past Medical History:   Diagnosis Date    Arthritis     Cancer (H) June 2017    DLBCL currently in remission    Cardiac arrest (H)     History of blood clots 2017    PICC line Right arm     History of chemotherapy     History of transfusion     Hypertension     Neuropathy     Physical deconditioning     PONV (postoperative nausea and vomiting)     Positive PPD, 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    VSD (ventricular septal defect)       Past Surgical History:   Procedure Laterality Date    ANESTHESIA CARDIOVERSION N/A 5/17/2017    Procedure: ANESTHESIA CARDIOVERSION;  ANESTHESIA CARDIOVERSION;  Surgeon: GENERIC ANESTHESIA PROVIDER;  Location: [facility]    ANESTHESIA CARDIOVERSION  3/12/2018    Procedure: ANESTHESIA CARDIOVERSION;;  Surgeon: GENERIC ANESTHESIA PROVIDER;  [facility]    ANESTHESIA CARDIOVERSION N/A 3/23/2018    Procedure: ANESTHESIA CARDIOVERSION;  Anesthesia Cardioversion ;  Surgeon: GENERIC ANESTHESIA PROVIDER;  Location:  OR    ANESTHESIA CARDIOVERSION N/A 4/12/2018    Procedure: ANESTHESIA CARDIOVERSION;  Cardioversion;  Surgeon: GENERIC ANESTHESIA PROVIDER;  [facility]    ARTHRODESIS WRIST  2000    Right wrist    BONE MARROW ASPIRATE &BIOPSY  7/12/2017         BONE MARROW BIOPSY W/ ASPIRATION  07/12/2017    CARDIOVERSION      5/17/17, 3/12/18, 3/23/18, 4/14/18,     COLONOSCOPY N/A 11/19/2019    Procedure: Colonoscopy, With Polypectomy And Biopsy;  Surgeon: Pj Vasques MD;  Location: [facility]    CV CORONARY ANGIOGRAM N/A 11/28/2022    Procedure: Coronary Angiogram;  Surgeon: Sundar Wood MD;  Location:  HEART CARDIAC CATH LAB    CV  RIGHT HEART CATH MEASUREMENTS RECORDED N/A 11/28/2022    Procedure: Right Heart Catheterization;  Surgeon: Sundar Wood MD;  Location:  HEART CARDIAC CATH LAB    EP ABLATION PVC N/A 12/1/2022    Procedure: Ablation Premature Ventricular Contractions;  Surgeon: Juan Eaton MD;  Location:  HEART CARDIAC CATH LAB    EP PACEMAKER N/A 12/13/2019    Procedure: EP Pacemaker;  Surgeon: Pj Trent MD;  Location:  HEART CARDIAC CATH LAB    EXCISE LESION UPPER EXTREMITY Left 5/22/2018    Procedure: EXCISE LESION UPPER EXTREMITY;  Left Arm Wound Excision And Closure, Possible Submuscular Transposition of Ulnar Nerve  (Choice Anesthesia);  Surgeon: Kevin Sheldon MD;  Location:  OR    FOOT SURGERY      4 left and 2 right    FOOT SURGERY      4 Left and 2 Right     IMPLANT PACEMAKER  12/13/2019    Dr China Trent  [facility] Cardiac Cath lab     IMPLANT PACEMAKER      RELEASE CARPAL TUNNEL Bilateral     RELEASE CARPAL TUNNEL BILATERAL      REPAIR VALVE TRICUSPID MINIMALLY INVASIVE N/A 4/10/2023    Procedure: MINIMALLY INVASIVE TRICUSPID VALVE REPLACEMENT USING 29MM ST. NILAM EPIC VALVE, FEMORAL CANNULATION AND RIGHT GROIN CUTDOWN, CARDIOPULMONARY BYPASS, TRANSESOPHAGEAL ECHOCARDIOGRAM PERFORMED BY ANESTHESIA STAFF;  Surgeon: Chilango Cope MD;  Location:  OR    REPAIR VENTRICULAR SEPTAL DEFECT  1969    TRANSPOSITION ULNAR NERVE (ELBOW) Left 5/22/2018    Procedure: TRANSPOSITION ULNAR NERVE (ELBOW);;  Surgeon: Kevin Sheldon MD;  Location:  OR    ULNAR NERVE TRANSPOSITION Left 05/22/2018    VSD REPAIR  1969    ZZC FUSION FOOT BONES,SUBTALAR Right 10/20/2020    Procedure: RIGHT SUBTALAR JOINT FUSION AND CALCANEOCUBOID FUSION;  Surgeon: Nemesio Crow MD;  Location: [facility];  Service: Orthopedics       Review of Systems   Constitutional:  Positive for chills. Negative for fever.   HENT:  Negative for congestion, ear pain, hearing loss and sore throat.    Eyes:  Negative for pain  "and visual disturbance.   Respiratory:  Negative for cough and shortness of breath.    Cardiovascular:  Negative for chest pain and palpitations.   Gastrointestinal:  Positive for constipation. Negative for abdominal pain, diarrhea and nausea.   Genitourinary:  Positive for frequency and urgency. Negative for dysuria, genital sores and hematuria.   Musculoskeletal:  Positive for arthralgias, joint swelling and myalgias.   Skin:  Negative for rash.   Neurological:  Positive for dizziness and weakness. Negative for headaches.   Psychiatric/Behavioral:  The patient is not nervous/anxious.         OBJECTIVE:   /78   Pulse 95   Temp 97.4  F (36.3  C) (Tympanic)   Resp 16   Ht 1.448 m (4' 9.01\")   Wt 48.2 kg (106 lb 4.8 oz)   SpO2 98%   BMI 23.00 kg/m    Physical Exam  GENERAL: healthy, alert and no distress  EYES: Eyes grossly normal to inspection, PERRL and conjunctivae and sclerae normal  HENT: ear canals and TM's normal, nose and mouth without ulcers or lesions  NECK: no adenopathy, no asymmetry, masses, or scars and thyroid normal to palpation  RESP: lungs clear to auscultation - no rales, rhonchi or wheezes  CV: regular rate and rhythm, normal S1 S2, no S3 or S4, no murmur, click or rub, no peripheral edema and peripheral pulses strong  ABDOMEN: soft, nontender, no hepatosplenomegaly, no masses and bowel sounds normal  MS: no gross musculoskeletal defects noted, no edema  POSITIVE right anterior ~5th rib feels like an indent, the rib does not feel perfectly straight like the others. No masses no tendernessf  SKIN: no suspicious lesions or rashes  POSITIVE posterior scalp shows large nontender hematoma. No tendneress, no signs of infection. Healing bruising along neck and shoulder  NEURO: Normal strength and tone, mentation intact and speech normal  BACK: no CVA tenderness, no paralumbar tenderness  PSYCH: mentation appears normal, affect normal/bright  LYMPH: no cervical, supraclavicular, axillary, or " inguinal adenopathy    Diagnostic Test Results:  Labs reviewed in Epic    ASSESSMENT/PLAN:   ASSESSMENT/PLAN:      ICD-10-CM    1. Encounter for Medicare annual wellness exam  Z00.00       2. Stage 3 chronic kidney disease, unspecified whether stage 3a or 3b CKD (H)  N18.30 Albumin Random Urine Quantitative with Creat Ratio     Albumin Random Urine Quantitative with Creat Ratio      3. Paroxysmal atrial fibrillation (H)  I48.0       4. Rheumatoid arthritis of both ankles, unspecified whether rheumatoid factor present (H)  M06.9 gabapentin (NEURONTIN) 100 MG capsule      5. Hyperlipidemia LDL goal <130  E78.5 Lipid panel reflex to direct LDL Fasting     Lipid panel reflex to direct LDL Fasting      6. Primary pulmonary hypertension (H)  I27.0       7. Primary hypertension  I10       8. Embolism and thrombosis of unspecified artery (H)  I74.9       9. Cardiogenic shock (H)  R57.0       10. Atrial flutter, unspecified type (H)  I48.92       11. Acute on chronic diastolic heart failure (H)  I50.33       12. Acute heart failure with preserved ejection fraction (HFpEF) (H)  I50.31       13. Lymphedema of both lower extremities  I89.0 Compression Sleeve/Stocking Order for DME - ONLY FOR DME     Compression Sleeve/Stocking Order for DME - ONLY FOR DME      14. Thrombocytopenia, unspecified (H24)  D69.6       15. Diffuse large B-cell lymphoma, unspecified body region (H)  C83.30 CBC with Platelets & Differential      16. Diffuse large B-cell lymphoma of extranodal site (H)  C83.39       17. Prediabetes  R73.03 Hemoglobin A1c     Hemoglobin A1c      18. Heart failure with preserved ejection fraction, NYHA class I (H)  I50.30 Basic metabolic panel      19. Falls frequently  R29.6       20. 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 Scheduled preop with me and has cardiology preop visit scheduled as well.  - CHF/cardiogenic shock/A fib/embolism: follows with  cardiology. Has upcoming appointments.  - RA: follows with rheumatology. She has been doing more and hands are more sore. Had to reschedule rheumatology appointment because it was the date of the surgery. Thinking of starting biologic as next step. Plans to reach out to them sooner than summer appointment.  Gabapentin for pain: refille.d working okay. Will occasionally get restless leg but thinks it's when hip is bad  - has eye appointment 1/11/24 due to plaquenil  - lymphedema: compression stockings, needs new prescription.  - B cell lymphoma/thrombocytopenia/neutropenia: monitored by oncology  - prednisone induced osteoporosis: follows with endocrine. Taking fosamax and did have reclast as well.  - prediabetes: on frequent/daily prednisone.   - CKD: check labs. Offered nephrology consult as recommended by rheumatologist. Patient declines. She does not want more specialist appointments. Will monitor for now.  - more frequent falls.   She had a fall over 1 week ago  Has hematoma on head. Bruising on shoulder. Healing, improving. She did not go to the ER with head injury and understands why this is the recommendation. She denies any concussion symptoms or lasting effects.  Does have bruising on her neck from pulling on her purse. Will recheck CBC as well with history of thrombocytopenia.  She will continue to use walker. She declines home evaluation for fall risk.  - rib anomaly: unknown cause. Does not feel like a mass. Patient noticed it one day but no pain. Feels like an indent in the rib. Unknown if this is her normal anatomy vs post op from her chest surgeries. Reviewed previous xrays but difficult to visualize. Would like x-ray - however we don't have x-ray tech in clinic today. Will check at next visit.      COUNSELING:  Reviewed preventive health counseling, as reflected in patient instructions       Healthy diet/nutrition       Osteoporosis prevention/bone health        Amelia Michel reports that Amelia  GARO Michel has never smoked. Amelia Michel has never used smokeless tobacco.          Gala Stringer PA-C  [facility]  Answers submitted by the patient for this visit:  Annual Preventive Visit (Submitted on 12/20/2023)  Chief Complaint: Annual Exam:  Blood in stool: No  heartburn: No  peripheral edema: Yes  mood changes: No  Skin sensation changes: No  pelvic pain: No  vaginal bleeding: No  vaginal discharge: No  tenderness: No  breast mass: No  breast discharge: No  impotence: No  penile discharge: No  The patient was provided with suggestions to help Amelia Michel develop a healthy physical lifestyle.  Amelia Michel is at risk for lack of exercise and has been provided with information to increase physical activity for the benefit of Amelia Michel's well-being.  The patient reports that Amelia Michel has difficulty with activities of daily living. I have asked that the patient make a follow up appointment in 3 weeks where this issue will be further evaluated and addressed.  The patient was provided with written information regarding signs of hearing loss.  Information on urinary incontinence and treatment options given to patient.  Information on urinary incontinence and treatment options given to patient.  The patient was provided with suggestions to help Amelia Michel develop a healthy emotional lifestyle.

## 2023-12-23 PROBLEM — R29.6 FALLS FREQUENTLY: Status: ACTIVE | Noted: 2023-12-23

## 2023-12-23 PROBLEM — I50.30 HEART FAILURE WITH PRESERVED EJECTION FRACTION, NYHA CLASS I (H): Status: ACTIVE | Noted: 2023-01-24

## 2023-12-23 PROBLEM — R73.03 PREDIABETES: Status: ACTIVE | Noted: 2023-12-23

## 2023-12-23 NOTE — PATIENT INSTRUCTIONS
Patient Education   Personalized Prevention Plan  You are due for the preventive services outlined below.  Your care team is available to assist you in scheduling these services.  If you have already completed any of these items, please share that information with your care team to update in your medical record.  Health Maintenance Due   Topic Date Due     Hepatitis A Vaccine (1 of 2 - Risk 2-dose series) Never done     Eye Exam  10/31/2020     Hepatitis B Vaccine (1 of 3 - Risk 3-dose series) Never done     RSV VACCINE (Pregnancy & 60+) (1 - 1-dose 60+ series) Never done     COVID-19 Vaccine (7 - 2023-24 season) 12/22/2023     Annual Wellness Visit  12/19/2023     Your Health Risk Assessment indicates you feel you are not in good health    A healthy lifestyle helps keep the body fit and the mind alert. It helps protect you from disease, helps you fight disease, and helps prevent chronic disease (disease that doesn't go away) from getting worse. This is important as you get older and begin to notice twinges in muscles and joints and a decline in the strength and stamina you once took for granted. A healthy lifestyle includes good healthcare, good nutrition, weight control, recreation, and regular exercise. Avoid harmful substances and do what you can to keep safe. Another part of a healthy lifestyle is stay mentally active and socially involved.    Good healthcare   Have a wellness visit every year.   If you have new symptoms, let us know right away. Don't wait until the next checkup.   Take medicines exactly as prescribed and keep your medicines in a safe place. Tell us if your medicine causes problems.   Healthy diet and weight control   Eat 3 or 4 small, nutritious, low-fat, high-fiber meals a day. Include a variety of fruits, vegetables, and whole-grain foods.   Make sure you get enough calcium in your diet. Calcium, vitamin D, and exercise help prevent osteoporosis (bone thinning).   If you live alone, try  "eating with others when you can. That way you get a good meal and have company while you eat it.   Try to keep a healthy weight. If you eat more calories than your body uses for energy, it will be stored as fat and you will gain weight.     Recreation   Recreation is not limited to sports and team events. It includes any activity that provides relaxation, interest, enjoyment, and exercise. Recreation provides an outlet for physical, mental, and social energy. It can give a sense of worth and achievement. It can help you stay healthy.    Mental Exercise and Social Involvement  Mental and emotional health is as important as physical health. Keep in touch with friends and family. Stay as active as possible. Continue to learn and challenge yourself.   Things you can do to stay mentally active are:  Learn something new, like a foreign language or musical instrument.   Play SCRABBLE or do crossword puzzles. If you cannot find people to play these games with you at home, you can play them with others on your computer through the Internet.   Join a games club--anything from card games to chess or checkers or lawn bowling.   Start a new hobby.   Go back to school.   Volunteer.   Read.   Keep up with world events.  Learning About Being Physically Active  What is physical activity?     Being physically active means doing any kind of activity that gets your body moving.  The types of physical activity that can help you get fit and stay healthy include:  Aerobic or \"cardio\" activities. These make your heart beat faster and make you breathe harder, such as brisk walking, riding a bike, or running. They strengthen your heart and lungs and build up your endurance.  Strength training activities. These make your muscles work against, or \"resist,\" something. Examples include lifting weights or doing push-ups. These activities help tone and strengthen your muscles and bones.  Stretches. These let you move your joints and muscles " through their full range of motion. Stretching helps you be more flexible.  Reaching a balance between these three types of physical activity is important because each one contributes to your overall fitness.  What are the benefits of being active?  Being active is one of the best things you can do for your health. It helps you to:  Feel stronger and have more energy to do all the things you like to do.  Focus better at school or work.  Feel, think, and sleep better.  Reach and stay at a healthy weight.  Lose fat and build lean muscle.  Lower your risk for serious health problems, including diabetes, heart attack, high blood pressure, and some cancers.  Keep your heart, lungs, bones, muscles, and joints strong and healthy.  How can you make being active part of your life?  Start slowly. Make it your long-term goal to get at least 30 minutes of exercise on most days of the week. Walking is a good choice. You also may want to do other activities, such as running, swimming, cycling, or playing tennis or team sports.  Pick activities that you like--ones that make your heart beat faster, your muscles stronger, and your muscles and joints more flexible. If you find more than one thing you like doing, do them all. You don't have to do the same thing every day.  Get your heart pumping every day. Any activity that makes your heart beat faster and keeps it at that rate for a while counts.  Here are some great ways to get your heart beating faster:  Go for a brisk walk, run, or hike.  Go for a swim or bike ride.  Take an online exercise class or dance.  Play a game of touch football, basketball, or soccer.  Play tennis, pickleball, or racquetball.  Climb stairs.  Even some household chores can be aerobic. Just do them at a faster pace. Raking or mowing the lawn, sweeping the garage, and vacuuming and cleaning your home all can help get your heart rate up.  Strengthen your muscles during the week. You don't have to lift heavy  "weights or grow big, bulky muscles to get stronger. Doing a few simple activities that make your muscles work against, or \"resist,\" something can help you get stronger. Aim for at least twice a week.  For example, you can:  Do push-ups or sit-ups, which use your own body weight as resistance.  Lift weights or dumbbells or use stretch bands at home or in a gym or community center.  Stretch your muscles often. Stretching will help you as you become more active. It can help you stay flexible and loosen tight muscles. It can also help improve your balance and posture and can be a great way to relax.  Be sure to stretch the muscles you'll be using when you work out. It's best to warm your muscles slightly before you stretch them. Walk or do some other light aerobic activity for a few minutes. Then start stretching.  When you stretch your muscles:  Do it slowly. Stretching is not about going fast or making sudden movements.  Don't push or bounce during a stretch.  Hold each stretch for at least 15 to 30 seconds, if you can. You should feel a stretch in the muscle, but not pain.  Breathe out as you do the stretch. Then breathe in as you hold the stretch. Don't hold your breath.  If you're worried about how more activity might affect your health, have a checkup before you start. Follow any special advice your doctor gives you for getting a smart start.  Where can you learn more?  Go to https://[email or web address]/patiented  Enter W332 in the search box to learn more about \"Learning About Being Physically Active.\"  Current as of: June 6, 2023               Content Version: 13.8    9819-0786 [employer].   Care instructions adapted under license by your healthcare professional. If you have questions about a medical condition or this instruction, always ask your healthcare professional. [employer] disclaims any warranty or liability for your use of this information.      Activities of Daily " Living    Your Health Risk Assessment indicates you have difficulties with activities of daily living such as housework, bathing, preparing meals, taking medication, etc. Please make a follow up appointment for us to address this issue in more detail.  Hearing Loss: Care Instructions  Overview     Hearing loss is a sudden or slow decrease in how well you hear. It can range from slight to profound. Permanent hearing loss can occur with aging. It also can happen when you are exposed long-term to loud noise. Examples include listening to loud music, riding motorcycles, or being around other loud machines.  Hearing loss can affect your work and home life. It can make you feel lonely or depressed. You may feel that you have lost your independence. But hearing aids and other devices can help you hear better and feel connected to others.  Follow-up care is a key part of your treatment and safety. Be sure to make and go to all appointments, and call your doctor if you are having problems. It's also a good idea to know your test results and keep a list of the medicines you take.  How can you care for yourself at home?  Avoid loud noises whenever possible. This helps keep your hearing from getting worse.  Always wear hearing protection around loud noises.  Wear a hearing aid as directed.  A professional can help you pick a hearing aid that will work best for you.  You can also get hearing aids over the counter for mild to moderate hearing loss.  Have hearing tests as your doctor suggests. They can show whether your hearing has changed. Your hearing aid may need to be adjusted.  Use other devices as needed. These may include:  Telephone amplifiers and hearing aids that can connect to a television, stereo, radio, or microphone.  Devices that use lights or vibrations. These alert you to the doorbell, a ringing telephone, or a baby monitor.  Television closed-captioning. This shows the words at the bottom of the screen. Most new  "TVs can do this.  TTY (text telephone). This lets you type messages back and forth on the telephone instead of talking or listening. These devices are also called TDD. When messages are typed on the keyboard, they are sent over the phone line to a receiving TTY. The message is shown on a monitor.  Use text messaging, social media, and email if it is hard for you to communicate by telephone.  Try to learn a listening technique called speechreading. It is not lipreading. You pay attention to people's gestures, expressions, posture, and tone of voice. These clues can help you understand what a person is saying. Face the person you are talking to, and have them face you. Make sure the lighting is good. You need to see the other person's face clearly.  Think about counseling if you need help to adjust to your hearing loss.  When should you call for help?  Watch closely for changes in your health, and be sure to contact your doctor if:    You think your hearing is getting worse.     You have new symptoms, such as dizziness or nausea.   Where can you learn more?  Go to https://[email or web address]/patiented  Enter R798 in the search box to learn more about \"Hearing Loss: Care Instructions.\"  Current as of: February 28, 2023               Content Version: 13.8    3837-1473 [employer].   Care instructions adapted under license by your healthcare professional. If you have questions about a medical condition or this instruction, always ask your healthcare professional. [employer] disclaims any warranty or liability for your use of this information.      Bladder Training: Care Instructions  Your Care Instructions     Bladder training is used to treat urge incontinence and stress incontinence. Urge incontinence means that the need to urinate comes on so fast that you can't get to a toilet in time. Stress incontinence means that you leak urine because of pressure on your bladder. For example, it may " happen when you laugh, cough, or lift something heavy.  Bladder training can increase how long you can wait before you have to urinate. It can also help your bladder hold more urine. And it can give you better control over the urge to urinate.  It is important to remember that bladder training takes a few weeks to a few months to make a difference. You may not see results right away, but don't give up.  Follow-up care is a key part of your treatment and safety. Be sure to make and go to all appointments, and call your doctor if you are having problems. It's also a good idea to know your test results and keep a list of the medicines you take.  How can you care for yourself at home?  Work with your doctor to come up with a bladder training program that is right for you. You may use one or more of the following methods.  Delayed urination  In the beginning, try to keep from urinating for 5 minutes after you first feel the need to go.  While you wait, take deep, slow breaths to relax. Kegel exercises can also help you delay the need to go to the bathroom.  After some practice, when you can easily wait 5 minutes to urinate, try to wait 10 minutes before you urinate.  Slowly increase the waiting period until you are able to control when you have to urinate.  Scheduled urination  Empty your bladder when you first wake up in the morning.  Schedule times throughout the day when you will urinate.  Start by going to the bathroom every hour, even if you don't need to go.  Slowly increase the time between trips to the bathroom.  When you have found a schedule that works well for you, keep doing it.  If you wake up during the night and have to urinate, do it. Apply your schedule to waking hours only.  Kegel exercises  These tighten and strengthen pelvic muscles, which can help you control the flow of urine. (If doing these exercises causes pain, stop doing them and talk with your doctor.) To do Kegel exercises:  Squeeze your  "muscles as if you were trying not to pass gas. Or squeeze your muscles as if you were stopping the flow of urine. Your belly, legs, and buttocks shouldn't move.  Hold the squeeze for 3 seconds, then relax for 5 to 10 seconds.  Start with 3 seconds, then add 1 second each week until you are able to squeeze for 10 seconds.  Repeat the exercise 10 times a session. Do 3 to 8 sessions a day.  When should you call for help?  Watch closely for changes in your health, and be sure to contact your doctor if:    Your incontinence is getting worse.     You do not get better as expected.   Where can you learn more?  Go to https://www.[employer].net/patiented  Enter V684 in the search box to learn more about \"Bladder Training: Care Instructions.\"  Current as of: February 28, 2023               Content Version: 13.8    6148-1189 [employer].   Care instructions adapted under license by your healthcare professional. If you have questions about a medical condition or this instruction, always ask your healthcare professional. [employer] disclaims any warranty or liability for your use of this information.      Bladder Training: Care Instructions  Your Care Instructions     Bladder training is used to treat urge incontinence and stress incontinence. Urge incontinence means that the need to urinate comes on so fast that you can't get to a toilet in time. Stress incontinence means that you leak urine because of pressure on your bladder. For example, it may happen when you laugh, cough, or lift something heavy.  Bladder training can increase how long you can wait before you have to urinate. It can also help your bladder hold more urine. And it can give you better control over the urge to urinate.  It is important to remember that bladder training takes a few weeks to a few months to make a difference. You may not see results right away, but don't give up.  Follow-up care is a key part of your treatment and " safety. Be sure to make and go to all appointments, and call your doctor if you are having problems. It's also a good idea to know your test results and keep a list of the medicines you take.  How can you care for yourself at home?  Work with your doctor to come up with a bladder training program that is right for you. You may use one or more of the following methods.  Delayed urination  In the beginning, try to keep from urinating for 5 minutes after you first feel the need to go.  While you wait, take deep, slow breaths to relax. Kegel exercises can also help you delay the need to go to the bathroom.  After some practice, when you can easily wait 5 minutes to urinate, try to wait 10 minutes before you urinate.  Slowly increase the waiting period until you are able to control when you have to urinate.  Scheduled urination  Empty your bladder when you first wake up in the morning.  Schedule times throughout the day when you will urinate.  Start by going to the bathroom every hour, even if you don't need to go.  Slowly increase the time between trips to the bathroom.  When you have found a schedule that works well for you, keep doing it.  If you wake up during the night and have to urinate, do it. Apply your schedule to waking hours only.  Kegel exercises  These tighten and strengthen pelvic muscles, which can help you control the flow of urine. (If doing these exercises causes pain, stop doing them and talk with your doctor.) To do Kegel exercises:  Squeeze your muscles as if you were trying not to pass gas. Or squeeze your muscles as if you were stopping the flow of urine. Your belly, legs, and buttocks shouldn't move.  Hold the squeeze for 3 seconds, then relax for 5 to 10 seconds.  Start with 3 seconds, then add 1 second each week until you are able to squeeze for 10 seconds.  Repeat the exercise 10 times a session. Do 3 to 8 sessions a day.  When should you call for help?  Watch closely for changes in your  "health, and be sure to contact your doctor if:    Your incontinence is getting worse.     You do not get better as expected.   Where can you learn more?  Go to https://www.[employer].net/patiented  Enter V684 in the search box to learn more about \"Bladder Training: Care Instructions.\"  Current as of: February 28, 2023               Content Version: 13.8    7896-8138 [email or web address].   Care instructions adapted under license by your healthcare professional. If you have questions about a medical condition or this instruction, always ask your healthcare professional. [email or web address] disclaims any warranty or liability for your use of this information.      Your Health Risk Assessment indicates you feel you are not in good emotional health.    Recreation   Recreation is not limited to sports and team events. It includes any activity that provides relaxation, interest, enjoyment, and exercise. Recreation provides an outlet for physical, mental, and social energy. It can give a sense of worth and achievement. It can help you stay healthy.    Mental Exercise and Social Involvement  Mental and emotional health is as important as physical health. Keep in touch with friends and family. Stay as active as possible. Continue to learn and challenge yourself.   Things you can do to stay mentally active are:  Learn something new, like a foreign language or musical instrument.   Play SCRABBLE or do crossword puzzles. If you cannot find people to play these games with you at home, you can play them with others on your computer through the Internet.   Join a games club--anything from card games to chess or checkers or lawn bowling.   Start a new hobby.   Go back to school.   Volunteer.   Read.   Keep up with world events.     "

## 2024-01-03 ENCOUNTER — TELEPHONE (OUTPATIENT)
Dept: FAMILY MEDICINE | Facility: CLINIC | Age: 64
End: 2024-01-03
Payer: COMMERCIAL

## 2024-01-03 NOTE — TELEPHONE ENCOUNTER
ED Medical Screen (RME)





- General


Chief Complaint: Fall Injury


Stated Complaint: HIP PAIN


Time Seen by Provider: 17 13:39


Notes: 





Patient states that she slipped getting out of the bathtub in her left leg bent 

back under her.  She now has excruciating left hip pain.


TRAVEL OUTSIDE OF THE [place] IN LAST 30 DAYS: No





- Related Data


Allergies/Adverse Reactions: 


 





latex [Latex] Allergy (Severe, Verified 17 13:01)


 swelling,burning


trazodone [Trazodone] Allergy (Intermediate, Verified 17 13:01)


 Hives


ibuprofen Allergy (Verified 17 13:01)


 


ketorolac [From Toradol] Allergy (Verified 17 13:01)


 


tramadol [Tramadol] Allergy (Verified 17 13:01)


 


ondansetron HCl [From Zofran] Adverse Reaction (Intermediate, Verified 17 

13:01)


 GI upset


hydrocodone bitartrate [From Vicodin] Adverse Reaction (Verified 17 13:01)


 GI upset








Home Medications: 


 Current Home Medications





Acetaminophen [Tylenol] 1,000 mg PO DAILY 17 [History]











Past Medical History





- Social History


Chew tobacco use (# tins/day): No


Frequency of alcohol use: None


Drug Abuse: None


Family history: Reviewed & Not Pertinent, Other - Her sisters all have 

dysmenorrhea





- Past Medical History


Cardiac Medical History: 


   Denies: Hx Pulmonary Embolism


Pulmonary Medical History: Reports: Hx Asthma - exercise induced


   Denies: Hx Sleep Apnea, Hx Tuberculosis


Renal/ Medical History: Reports: Hx Ovarian Cysts - PCOS.  Denies: Hx 

Peritoneal Dialysis


GI Medical History: Reports: Hx Gastroesophageal Reflux Disease, Hx Ulcer


Musculoskeltal Medical History: Reports Hx Musculoskeletal Deformity - Scoliosis

, Reports Hx Musculoskeletal Trauma - Sprains of knee and hip


Psychiatric Medical History: Reports: Hx Attention Deficit Hyperactivity 

Disorder, Hx Bipolar Disorder - age 10, Hx Personality Disorder, Hx Post 

Traumatic Stress Disorder, Hx Schizophrenia


   Denies: Hx Depression


Past Surgical History: Reports: Hx Breast Surgery - implants , Hx Cardiac 

Surgery, Hx  Section - x2, Hx Tubal Ligation.  Denies: Hx Hysterectomy, 

Hx Pacemaker





- Immunizations


Immunizations up to date: Yes


Hx Diphtheria, Pertussis, Tetanus Vaccination: Yes - received in 





Physical Exam





- Vital signs


Vitals: 





 











Temp Pulse Resp BP Pulse Ox


 


 98.8 F   135 H  24 H  97/71 L  99 


 


 17 13:05  17 13:05  17 13:05  17 13:05  17 13:05














Course





- Vital Signs


Vital signs: 





 











Temp Pulse Resp BP Pulse Ox


 


 98.8 F   135 H  24 H  97/71 L  99 


 


 17 13:05  17 13:05  17 13:05  17 13:05  17 13:05 I ordered these 12/22/23. Please fax both compression orders to that fax number.  Magda Stringer PA-C

## 2024-01-03 NOTE — TELEPHONE ENCOUNTER
Order/Referral Request    Who is requesting: [facility]    Orders being requested: compression stockings    Reason service is needed/diagnosis: unknown    When are orders needed by: asap today she is at the appointment now    Has this been discussed with Provider: Yes    Does patient have a preference on a Group/Provider/Facility? [facility]    Does patient have an appointment scheduled?: Yes: today -now    Where to send orders: Fax [phone]    [facility] [phone] [place]

## 2024-01-05 ENCOUNTER — ANCILLARY PROCEDURE (OUTPATIENT)
Dept: CARDIOLOGY | Facility: CLINIC | Age: 64
End: 2024-01-05
Attending: NURSE PRACTITIONER
Payer: COMMERCIAL

## 2024-01-05 DIAGNOSIS — I48.0 PAROXYSMAL ATRIAL FIBRILLATION (H): ICD-10-CM

## 2024-01-05 DIAGNOSIS — I07.1 SEVERE TRICUSPID REGURGITATION: ICD-10-CM

## 2024-01-05 DIAGNOSIS — I47.19 ATRIAL TACHYCARDIA (H): ICD-10-CM

## 2024-01-05 DIAGNOSIS — Q21.0 VSD (VENTRICULAR SEPTAL DEFECT): ICD-10-CM

## 2024-01-05 DIAGNOSIS — I50.22 CHRONIC SYSTOLIC HEART FAILURE (H): ICD-10-CM

## 2024-01-05 PROCEDURE — 93244 EXT ECG>48HR<7D REV&INTERPJ: CPT | Performed by: INTERNAL MEDICINE

## 2024-01-10 ENCOUNTER — TRANSFERRED RECORDS (OUTPATIENT)
Dept: HEALTH INFORMATION MANAGEMENT | Facility: CLINIC | Age: 64
End: 2024-01-10

## 2024-01-11 ENCOUNTER — LAB (OUTPATIENT)
Dept: LAB | Facility: CLINIC | Age: 64
End: 2024-01-11
Payer: COMMERCIAL

## 2024-01-11 ENCOUNTER — TELEPHONE (OUTPATIENT)
Dept: CARDIOLOGY | Facility: CLINIC | Age: 64
End: 2024-01-11

## 2024-01-11 ENCOUNTER — OFFICE VISIT (OUTPATIENT)
Dept: CARDIOLOGY | Facility: CLINIC | Age: 64
End: 2024-01-11
Attending: NURSE PRACTITIONER
Payer: COMMERCIAL

## 2024-01-11 VITALS
HEIGHT: 58 IN | HEART RATE: 101 BPM | SYSTOLIC BLOOD PRESSURE: 116 MMHG | BODY MASS INDEX: 22.75 KG/M2 | OXYGEN SATURATION: 97 % | DIASTOLIC BLOOD PRESSURE: 79 MMHG | WEIGHT: 108.4 LBS

## 2024-01-11 DIAGNOSIS — I50.30 HEART FAILURE WITH PRESERVED EJECTION FRACTION, NYHA CLASS I (H): ICD-10-CM

## 2024-01-11 DIAGNOSIS — I50.22 CHRONIC SYSTOLIC HEART FAILURE (H): ICD-10-CM

## 2024-01-11 DIAGNOSIS — I48.0 PAROXYSMAL ATRIAL FIBRILLATION (H): Primary | ICD-10-CM

## 2024-01-11 LAB
ANION GAP SERPL CALCULATED.3IONS-SCNC: 12 MMOL/L (ref 7–15)
BUN SERPL-MCNC: 27.1 MG/DL (ref 8–23)
CALCIUM SERPL-MCNC: 10 MG/DL (ref 8.8–10.2)
CHLORIDE SERPL-SCNC: 95 MMOL/L (ref 98–107)
CREAT SERPL-MCNC: 0.93 MG/DL (ref 0.51–1.17)
DEPRECATED HCO3 PLAS-SCNC: 30 MMOL/L (ref 22–29)
EGFRCR SERPLBLD CKD-EPI 2021: 69 ML/MIN/1.73M2
GLUCOSE SERPL-MCNC: 123 MG/DL (ref 70–99)
POTASSIUM SERPL-SCNC: 4.1 MMOL/L (ref 3.4–5.3)
SODIUM SERPL-SCNC: 137 MMOL/L (ref 135–145)

## 2024-01-11 PROCEDURE — 36415 COLL VENOUS BLD VENIPUNCTURE: CPT | Performed by: PATHOLOGY

## 2024-01-11 PROCEDURE — 99213 OFFICE O/P EST LOW 20 MIN: CPT | Performed by: NURSE PRACTITIONER

## 2024-01-11 PROCEDURE — 80048 BASIC METABOLIC PNL TOTAL CA: CPT | Performed by: PATHOLOGY

## 2024-01-11 PROCEDURE — 99214 OFFICE O/P EST MOD 30 MIN: CPT | Performed by: NURSE PRACTITIONER

## 2024-01-11 RX ORDER — SPIRONOLACTONE 25 MG/1
12.5 TABLET ORAL DAILY
Qty: 90 TABLET | Refills: 1 | Status: SHIPPED | OUTPATIENT
Start: 2024-01-11 | End: 2024-10-02

## 2024-01-11 RX ORDER — ENOXAPARIN SODIUM 100 MG/ML
40 INJECTION SUBCUTANEOUS 2 TIMES DAILY
Qty: 2.4 ML | Refills: 0 | Status: ON HOLD | OUTPATIENT
Start: 2024-01-21 | End: 2024-01-25

## 2024-01-11 ASSESSMENT — PAIN SCALES - GENERAL: PAINLEVEL: NO PAIN (0)

## 2024-01-11 NOTE — LETTER
1/11/2024      RE: Amelia Michel  [address]  [facility] [address]       Dear Colleague,    Thank you for the opportunity to participate in the care of your patient, Amelia Michel, at the [facility] [place] [facility]. Please see a copy of my visit note below.      [facility]   CORE Clinic      HPI:   Ms. 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 She was recently admitted 11/19/-12/5/22 with ADHF and frequent NSVT, now s/p VT ablation. She presents to [place] for hospital follow up.     With regards to her cardiac conditions, as noted, Ms. Michel presented to [facility] on 11/19 with MONTALVO, BLE edema, and 8 pound weight gain over the course of one week. Chest X-ray was suspicious for pulmonary edema, NT-P BNP 1,913, Troponin T 21-->12. EDW per chart review 116-120; admission weight 128. She was admitted w/ decompensated diastolic heart failure with pictutre c/b frequent non sustained ventricular tachycardia with RHC on 11/28/22 notable for cardiogenic shock with a cardiac index of 1.2 and CO of 1.6. Etiology thought to most likely secondary to frequent non sustained ventricular tachycardia in the setting of possible restrictive/constrictive physiology given her malignancy history (albeit presumably in remission). CAD ruled on by coronary angiogram completed on 11/28/22. She underwent VT ablation on 12/1/22 and had successful resumption of her home medications with increased diuretic dosing and adddition of an SGLT2 inhibitor. She was diuresed  19 lbs to a weight of 109 lbs at time of discharge.     At CORE visit on 1/9/23 Ms. Michel was hypervolemic as  evident by weight gain, tachycardia, and peripheral edema. Review of labs demonstrated persistent hyponatremia with Na 127 and bump in Cr to 1.35. Lasix was increase 60 mg BID. Still awaiting for PA for cardiomems device. Admitted 1/24-1/29 for CHF exacerbation. Diuresed and switched to torsemide 40 mg BID. Discharged at a weight of 107 lb.     Device interrogation from 2/7 with rates 120s-140, AFL. Diltiazem increased to 240 mg daily. Since our last CORE visit in 2/2023 she has resumed torsemide 20 mg BID and spironolactone 12.5 mg daily. She was evaluated by Dr. Cope for severe TR. Admitted 4/10-4/17/23 for elective TVR with Dr. Cope.     CORE visit in 5/2023, Ms. Michel was feeling well. Weights are very stable at 103-104 lbs. Echo at that time showed preserved LVEF and mildly improved RV function. Diltiazem was increased to 180 mg daily for elevated heart rates. Started on spironolactone.   CORE visit 9/2023 feeling well. Weight stable at 108 lb. No medication changes.     Today, Ms. Michel reports feeling relatively well. She is scheduled for hip surgery on 1/24 and is presenting for cardiac clearance. Other than pain related to ortho issues, she is feeling well. Weights have been relatively stable around 106-108 lb. She is without peripheral edema, worsening dyspnea on exertion, orthopnea, or PND.  Denies chest pain, SOB, palpitations, lightheadedness, syncope, presyncope. Remains on eliquis for CVA prophylaxis and ASA 81 mg with new valve. No s/s of bleeding complications.     Cardiac Medications:   - ASA 81 mg daily  - Eliquis 5 mg BID  - Diltiazem 240 mg daily   - Jardiance 10 mg daily   - Torsemide 20 mg daily  - KCL 60 mEq daily    - Spironolactone 12.5 mg daily      PAST MEDICAL HISTORY:  Past Medical History:   Diagnosis Date     Arthritis      Cancer (H) June 2017    DLBCL currently in  remission     Cardiac arrest (H)      History of blood clots 2017    PICC line Right arm      History of chemotherapy      History of transfusion      Hypertension      Neuropathy      Physical deconditioning      PONV (postoperative nausea and vomiting)      Positive PPD, 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     VSD (ventricular septal defect)        FAMILY HISTORY:  Family History   Problem Relation Age of Onset     Breast Cancer Mother         60s     Hypertension Mother      Alzheimer Disease Mother      Cerebrovascular Disease Mother      Hypertension Father      Cerebrovascular Disease Father      Atrial fibrillation Father      Lymphoma Father      Prostate Cancer Father      Other Cancer Father         some form of Lymphoma     Alzheimer Disease Maternal Grandmother         likely     Arthritis Maternal Grandfather      Pneumonia Maternal Grandfather      Diabetes Paternal Grandmother      Mental Illness Sister         early onset  alzheimers       SOCIAL HISTORY:  Social History     Socioeconomic History     Marital status:      Spouse name: Romeo Michel     Number of children: 0   Occupational History     Employer: [employer]   Tobacco Use     Smoking status: Never     Smokeless tobacco: Never   Substance and Sexual Activity     Alcohol use: Not Currently     Comment: none     Drug use: No   Other Topics Concern     Parent/sibling w/ CABG, MI or angioplasty before 65F 55M? No     Social Determinants of Health     Intimate Partner Violence: Not At Risk     Fear of Current or Ex-Partner: No     Emotionally Abused: No     Physically Abused: No     Sexually Abused: No       CURRENT MEDICATIONS:  acetaminophen (TYLENOL) 325 MG tablet, Take 2 tablets (650 mg) by mouth every 4 hours as needed for other (For optimal non-opioid multimodal pain management to improve pain control.) (Patient not taking: Reported on 12/22/2023)  alendronate (FOSAMAX) 70 MG tablet, Take 1 tablet (70 mg) by mouth every 7 days  (Patient taking differently: Take 70 mg by mouth every 7 days Mondays)  apixaban ANTICOAGULANT (ELIQUIS ANTICOAGULANT) 5 MG tablet, Take 1 tablet (5 mg) by mouth 2 times daily  aspirin (ASA) 81 MG chewable tablet, 1 tablet (81 mg) by Oral or NG Tube route daily  calcium carbonate 600 mg-vitamin D 400 units (CALTRATE) 600-400 MG-UNIT per tablet, Take 2 tablets by mouth daily  dexamethasone (DECADRON) 4 MG/ML injection, Inject 4 mg IM for adrenal crisis (Patient not taking: Reported on 12/22/2023)  diltiazem ER (DILT-XR) 240 MG 24 hr ER beaded capsule, Take 1 capsule (240 mg) by mouth daily  docusate sodium (COLACE) 100 MG capsule, Take 100 mg by mouth 2 times daily as needed for constipation  empagliflozin (JARDIANCE) 10 MG TABS tablet, Take 1 tablet (10 mg) by mouth daily  gabapentin (NEURONTIN) 100 MG capsule, TAKE ONE CAPSULE BY MOUTH EVERY MORNING, 1 CAPSULE IN THE AFTERNOON, AND THEN TAKE 2 CAPSULES EVERY NIGHT AT BEDTIME  hydroxychloroquine (PLAQUENIL) 200 MG tablet, Take 1 tablet (200 mg) by mouth daily  loratadine (CLARITIN) 10 MG tablet, Take 10 mg by mouth daily  magnesium oxide 200 MG TABS, Take 200 mg by mouth daily bedtime  metolazone (ZAROXOLYN) 2.5 MG tablet, Take 1 tablet (2.5 mg) by mouth as needed (ONLY take if instructed to do so by your cardiology team.) (Patient not taking: Reported on 12/22/2023)  multivitamin, therapeutic with minerals (THERA-VIT-M) TABS tablet, Take 1 tablet by mouth daily  potassium chloride ER (KLOR-CON M) 20 MEQ CR tablet, Take 3 tablets (60 mEq) by mouth daily  predniSONE (DELTASONE) 1 MG tablet, Take 3 tablets (3 mg) by mouth daily - Oral  predniSONE (DELTASONE) 5 MG tablet, 10 mg when sick for 3 days or until back to baseline for secondary adrenal insufficiency (Patient not taking: Reported on 12/22/2023)  spironolactone (ALDACTONE) 25 MG tablet, Take 0.5 tablets (12.5 mg) by mouth daily  sulfamethoxazole-trimethoprim (BACTRIM DS) 800-160 MG tablet, Take 1 tablet by  "mouth 2 times daily (Patient not taking: Reported on 12/22/2023)  torsemide (DEMADEX) 20 MG tablet, Take 1 tablet (20 mg) by mouth daily  UNABLE TO FIND, MEDICATION NAME: Relief CBD soft gels (Patient not taking: Reported on 12/22/2023)  UNABLE TO FIND, MEDICATION NAME: CBD cream  Vitamin D (Cholecalciferol) 10 MCG (400 UNIT) TABS, Take 1 tablet by mouth daily  zoledronic Acid (RECLAST) 5 MG/100ML SOLN infusion,     No current facility-administered medications on file prior to visit.      ROS:   Refer to HPI    EXAM:  /79 (BP Location: Right arm, Patient Position: Chair, Cuff Size: Adult Regular)   Pulse 101   Ht 1.471 m (4' 9.91\")   Wt 49.2 kg (108 lb 6.4 oz)   SpO2 97%   BMI 22.72 kg/m     GENERAL: Appears comfortable, in no acute distress.   HEENT: Eye symmetrical, no discharge or icterus bilaterally. Mucous membranes moist and without lesions.  CV: tachycardic, irregular, +S1S2, systolic murmur, no rub, or gallop. JVP at clavicle    RESPIRATORY: Respirations regular, even, and unlabored. Lungs CTA throughout.   GI: Soft and non distended with normoactive bowel sounds present in all quadrants. No tenderness, rebound, guarding. No hepatomegaly.   EXTREMITIES: No peripheral edema. 2+ bilateral pedal pulses.   NEUROLOGIC: Alert and oriented x 3. No focal deficits.   MUSCULOSKELETAL: No joint swelling or tenderness.   SKIN: No jaundice. No rashes or lesions.     Labs, reviewed with patient in clinic today:  CBC RESULTS:  Lab Results   Component Value Date    WBC 6.0 12/22/2023    WBC Canceled, Test credited 03/16/2021    RBC 4.07 12/22/2023    RBC Canceled, Test credited 03/16/2021    HGB 14.2 12/22/2023    HGB Canceled, Test credited 03/16/2021    HCT 41.6 12/22/2023    HCT Canceled, Test credited 03/16/2021     (H) 12/22/2023    MCV Canceled, Test credited 03/16/2021    [facility]CH 34.9 (H) 12/22/2023    [facility] Canceled, Test credited 03/16/2021    [facility] 34.1 12/22/2023    [facility] Canceled, Test credited " 03/16/2021    RDW 11.4 12/22/2023    RDW Canceled, Test credited 03/16/2021     12/22/2023    PLT Canceled, Test credited 03/16/2021       CMP RESULTS:  Lab Results   Component Value Date     (L) 12/22/2023     (L) 11/29/2022     06/23/2021    POTASSIUM 4.4 12/22/2023    POTASSIUM 3.5 04/10/2023    POTASSIUM 3.9 07/20/2022    POTASSIUM 4.2 06/23/2021    CHLORIDE 96 (L) 12/22/2023    CHLORIDE 101 07/20/2022    CHLORIDE 102 06/23/2021    CO2 24 12/22/2023    CO2 28 07/20/2022    CO2 28 06/23/2021    ANIONGAP 13 12/22/2023    ANIONGAP 7 07/20/2022    ANIONGAP 6 06/23/2021     (H) 12/22/2023     (H) 04/17/2023     (H) 07/20/2022    GLC 98 06/23/2021    BUN 29.0 (H) 12/22/2023    BUN 31 (H) 07/20/2022    BUN 25 06/23/2021    CR 0.90 12/22/2023    CR 1.05 (H) 06/23/2021    GFRESTIMATED 71 12/22/2023    GFRESTIMATED 57 (L) 06/23/2021    GFRESTBLACK 67 06/23/2021    VIKTORIYA 9.2 12/22/2023    VIKTORIYA 10.0 06/23/2021    BILITOTAL 0.9 09/14/2023    BILITOTAL 1.2 06/23/2021    ALBUMIN 5.0 09/21/2023    ALBUMIN 4.4 04/20/2022    ALBUMIN 4.2 06/23/2021    ALKPHOS 116 09/14/2023    ALKPHOS 136 06/23/2021    ALT 22 09/21/2023    ALT 41 06/23/2021    AST 35 09/21/2023    AST 36 06/23/2021        INR RESULTS:  Lab Results   Component Value Date    INR 1.71 (H) 04/10/2023    INR 3.42 (H) 03/07/2018       Lab Results   Component Value Date    MAG 2.0 04/16/2023    MAG 1.9 04/12/2018     Lab Results   Component Value Date    NTBNPI 2,067 (H) 01/24/2023    NTBNPI 485 12/12/2019     Lab Results   Component Value Date    NTBNP 1,425 (H) 10/13/2021    NTBNP 1,274 (H) 05/26/2021       Cardiac Diagnostics:    11/16/2023 Device Interrogation   Remote pacemaker transmission received and reviewed. Device transmission sent per MD orders.     Device: Medtronic [id number] [place] XT DR MRI  Normal Device Function  Mode: DDDR  bpm  AP: 82.8%  : 98.1%  Presenting EGM: AP- @ 75 bpm  Short V-V intervals:  0  Lead Trends Appear Stable  Estimated battery longevity to RRT = 10 years. Battery voltage = 3.00 V.   Atrial Arrhythmia: 202 AT/AF episodes recorded - < 1 min - 13 hours 31 minutes.  AF [place]: 7.0%  Anticoagulant: Eliquis  Ventricular Arrhythmia: 8 NSVT episodes recorded - 1-2 seconds, 167-218 bpm.   2 VT episodes recorded - 5-7 seconds, 167-188 bpm.   Pt Notified of Transmission Results: MyChart      5/24/2023 Echo   Interpretation Summary  Tricuspid valve replacement with 29mm [place] Sil[place]io Epic.  Doppler interrogation of the tricspid valve is normal.  Global and regional left ventricular function is normal with an EF of 55-60%.  The right ventricle is normal size. Global right ventricular function is  mildly to moderately reduced.  Severe biatrial enlargement is present.  Mild to moderate mitral insufficiency is present.  IVC diameter >2.1 cm collapsing <50% with sniff suggests a high RA pressure  estimated at 15 mmHg or greater.  No pericardial effusion is present.  Compared to prior, RV appears slightly better, CVP is higher now.    4/17/2023 Echo   Interpretation Summary  Minimally invasive tricuspid valve replacement with 29mm [place] Sil[place]io Epic. Doppler  interrogation of the tricspid valve is normal. TV mean gradient 4-6 mmHg.  Moderate right ventricular dilation is present. Global right ventricular  function is mildly to moderately reduced.  Global and regional left ventricular function is normal with an EF of 60-65%.  Mild to moderate mitral insufficiency is present.  IVC diameter <2.1 cm collapsing >50% with sniff suggests a normal RA pressure  of 3 mmHg.  No pericardial effusion is present.  Compared to prior TVR is new.    2/7/2023 Device Interrogation   Device: Docin [id number] [place] XT DR GALLEGOS  Normal Device Function.  Mode: VVIR  bpm  : 8.6%  Presenting EGM: Narrow Complex tachycardia @ 139 bpm  Short V-V intervals: 0  Lead Trends Appear Stable.  Estimated battery longevity to RRT = 11 years. Battery  voltage = 3.02 V.  Atrial Arrhythmia: AFL  Anticoagulant: Apixaban  Ventricular Arrhythmia: 4 episodes lasting 1-4 sec 194-245 bpm  Transmission discussed with Maria Esther Cutler NP. Orders to increase diltiazem from 180 mg daily to 240 mg daily.    12/5/2022 ECHO  Interpretation Summary  Severe tricuspid insufficiency is present.  Moderate right ventricular dilation is present. Global right ventricular  function is mildly reduced.  Global and regional left ventricular function is normal with an EF of 55-60%.  IVC diameter >2.1 cm collapsing <50% with sniff suggests a high RA pressure  estimated at 15 mmHg or greater.  No pericardial effusion is present.  No significant changes noted.    11/28/2022 RHC with coronary angiogram   Right sided filling pressures are severely elevated.  Mild elevated pulmonary hypertension.  Left sided filling pressures are moderately elevated.  Reduced cardiac output level.  Hemodynamic data has been modified in Epic per physician review.  Prox LAD lesion is 30% stenosed.     Mild non-obstructive coronary artery disease        Assessment and Plan:   Ms. 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 She was recently admitted 11/19/-12/5/22 with ADHF and frequent NSVT, now s/p VT ablation. She presents to [facility] for hospital follow up.     Overall, appearing well. Weights are stable, euvolemic by exam. Review of today's labs demonstrate stable renal function and normal lytes. Functional class I-II symptoms. From a cardiac perspective she is of acceptable risk to proceed with ortho surgery. No further  cardiac testing needed.  Recommend bridging with lovenox 3 days prior to surgery. Hold jardiance 3 days prior to surgery as well.     # Pre-operative Cardiac Clearance  Risk factors for perioperative MACE include compensated diastolic heart failure,atrial arrhythmias (AF/AFL), and immediate risk surgery. While her activity is somewhat limited due ortho issues, I believe that her functional status is satisfactory and that she is able to achieve > 4 METS, thus RCRI risk score is 1. This score corresponds with a 6% risk of periprocedural MACE.   - Overall, low risk for coronary events or arrhythmia. Of acceptable risk to proceed with ortho surgery.   - She is on eliquis for high burden of AF/AFL. Recommend bridging with lovenox 3 days prior to surgery. Resume eliquis post-op.     # Acute on chronic diastolic heart failure with dilated and mildly reduced RV function (LVEF 55-60%)  Pre-Ablation: 11/28/22 RHC: CVP 24, PA 48/27/34, PCWP 23, SVO2 46, CI 1.1, CO 1.6  Post Ablation and Diuresis: 12/3/22 RHC: CVP 16, PA 41/22/28, PCWP 15, SVO2 69, CI 2.1, CO 3.0    NYHA Class III, AHA/ACC Stage C  Rate control: 101, continue diltiazem   volume status: euvolemic, continue torsemide 20 mg daily    Blood pressure control:  Diltiazem 240 mg daily   Aldosterone antagonist: Aldactone 12.5 mg daily   SGLT2:  Empagliflozin 10 mg daily   Evaluation of coronary arteries: 11/28/22 angiogram mild non-obstructive CAD     # Frequent, non-sustained ventricular tachycardia s/p VT Ablation (by Dr. Eaton on 12/1/22)  - Anti-arrhythmic: none, s/p VT ablation  - Stopped PTA digoxin per EP recommendations  - Anticoagulation: given history of Afib, continue Apixaban 5 mg bid  - Follow up with EP specialists (Dr. Eaton and/or Maria Esther Cutler NP)    # HTN Intolerant to Metoprolol in the past.   - Diltiazem 240 mg daily, spironolactone 12.5 mg daily       # Aflutter. History of SSS s/p PPM 12/19. Long standing history of atrial arrythmias.  -  Diltiazem 240 mg daily  - Continue Eliquis.   - Follow up with EP as scheduled.      # VSD s/p repair.     # History of exudative pericardial effusion.   - Stable per CT 3/19.     # Severe TR per TTE 11/2022 s/p TVR on 4/10/23  - continue ASA 81 mg daily         Follow up:  - CORE 3/20/24        Elmira Vasquez DNP, NP-C  Advance Heart Failure  1/11/2024

## 2024-01-11 NOTE — NURSING NOTE
Chief Complaint   Patient presents with    Follow Up     Return CORE ; 62 year old with chronic diastolic heart failure presents for follow up with labs prior. Needs cardiac clearance prior to hip surgery on 1/24.       Vitals were taken, medications reconciled.    Meche Lazo, Facilitator   11:38 AM

## 2024-01-11 NOTE — PROGRESS NOTES
[facility] Cardiology   CORE Clinic      HPI:   Ms. 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 She was recently admitted 11/19/-12/5/22 with ADHF and frequent NSVT, now s/p VT ablation. She presents to [facility] [place] hospital follow up.     With regards to her cardiac conditions, as noted, Ms. Michel presented to [facility] on 11/19 with MONTALVO, BLE edema, and 8 pound weight gain over the course of one week. Chest X-ray was suspicious for pulmonary edema, NT-P BNP 1,913, Troponin T 21-->12. EDW per chart review 116-120; admission weight 128. She was admitted w/ decompensated diastolic heart failure with pictutre c/b frequent non sustained ventricular tachycardia with RHC on 11/28/22 notable for cardiogenic shock with a cardiac index of 1.2 and CO of 1.6. Etiology thought to most likely secondary to frequent non sustained ventricular tachycardia in the setting of possible restrictive/constrictive physiology given her malignancy history (albeit presumably in remission). CAD ruled on by coronary angiogram completed on 11/28/22. She underwent VT ablation on 12/1/22 and had successful resumption of her home medications with increased diuretic dosing and adddition of an SGLT2 inhibitor. She was diuresed 19 lbs to a weight of 109 lbs at time of discharge.     At CORE visit on 1/9/23 Ms. Michel was hypervolemic as  evident by weight gain, tachycardia, and peripheral edema. Review of labs demonstrated persistent hyponatremia with Na 127 and bump in Cr to 1.35. Lasix was increase 60 mg BID. Still awaiting for PA for cardiomems device. Admitted  1/24-1/29 for CHF exacerbation. Diuresed and switched to torsemide 40 mg BID. Discharged at a weight of 107 lb.     Device interrogation from 2/7 with rates 120s-140, AFL. Diltiazem increased to 240 mg daily. Since our last CORE visit in 2/2023 she has resumed torsemide 20 mg BID and spironolactone 12.5 mg daily. She was evaluated by Dr. Cope for severe TR. Admitted 4/10-4/17/23 for elective TVR with Dr. Cope.     CORE visit in 5/2023, Ms. Michel was feeling well. Weights are very stable at 103-104 lbs. Echo at that time showed preserved LVEF and mildly improved RV function. Diltiazem was increased to 180 mg daily for elevated heart rates. Started on spironolactone.   CORE visit 9/2023 feeling well. Weight stable at 108 lb. No medication changes.     Today, Ms. Michel reports feeling relatively well. She is scheduled for hip surgery on 1/24 and is presenting for cardiac clearance. Other than pain related to ortho issues, she is feeling well. Weights have been relatively stable around 106-108 lb. She is without peripheral edema, worsening dyspnea on exertion, orthopnea, or PND.  Denies chest pain, SOB, palpitations, lightheadedness, syncope, presyncope. Remains on eliquis for CVA prophylaxis and ASA 81 mg with new valve. No s/s of bleeding complications.     Cardiac Medications:   - ASA 81 mg daily  - Eliquis 5 mg BID  - Diltiazem 240 mg daily   - Jardiance 10 mg daily   - Torsemide 20 mg daily  - KCL 60 mEq daily    - Spironolactone 12.5 mg daily      PAST MEDICAL HISTORY:  Past Medical History:   Diagnosis Date    Arthritis     Cancer (H) June 2017    DLBCL currently in remission    Cardiac arrest (H)     History of blood clots 2017    PICC line Right arm     History of chemotherapy     History of transfusion     Hypertension     Neuropathy     Physical deconditioning     PONV (postoperative nausea and vomiting)     Positive PPD, 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    VSD (ventricular septal defect)        FAMILY HISTORY:  Family  History   Problem Relation Age of Onset    Breast Cancer Mother         60s    Hypertension Mother     Alzheimer Disease Mother     Cerebrovascular Disease Mother     Hypertension Father     Cerebrovascular Disease Father     Atrial fibrillation Father     Lymphoma Father     Prostate Cancer Father     Other Cancer Father         some form of Lymphoma    Alzheimer Disease Maternal Grandmother         likely    Arthritis Maternal Grandfather     Pneumonia Maternal Grandfather     Diabetes Paternal Grandmother     Mental Illness Sister         early onset  alzheimers       SOCIAL HISTORY:  Social History     Socioeconomic History    Marital status:      Spouse name: Romeo Michel    Number of children: 0   Occupational History     Employer: [employer]   Tobacco Use    Smoking status: Never    Smokeless tobacco: Never   Substance and Sexual Activity    Alcohol use: Not Currently     Comment: none    Drug use: No   Other Topics Concern    Parent/sibling w/ CABG, MI or angioplasty before 65F 55M? No     Social Determinants of Health     Intimate Partner Violence: Not At Risk    Fear of Current or Ex-Partner: No    Emotionally Abused: No    Physically Abused: No    Sexually Abused: No       CURRENT MEDICATIONS:  acetaminophen (TYLENOL) 325 MG tablet, Take 2 tablets (650 mg) by mouth every 4 hours as needed for other (For optimal non-opioid multimodal pain management to improve pain control.) (Patient not taking: Reported on 12/22/2023)  alendronate (FOSAMAX) 70 MG tablet, Take 1 tablet (70 mg) by mouth every 7 days (Patient taking differently: Take 70 mg by mouth every 7 days Mondays)  apixaban ANTICOAGULANT (ELIQUIS ANTICOAGULANT) 5 MG tablet, Take 1 tablet (5 mg) by mouth 2 times daily  aspirin (ASA) 81 MG chewable tablet, 1 tablet (81 mg) by Oral or NG Tube route daily  calcium carbonate 600 mg-vitamin D 400 units (CALTRATE) 600-400 MG-UNIT per tablet, Take 2 tablets by mouth  daily  dexamethasone (DECADRON) 4 MG/ML injection, Inject 4 mg IM for adrenal crisis (Patient not taking: Reported on 12/22/2023)  diltiazem ER (DILT-XR) 240 MG 24 hr ER beaded capsule, Take 1 capsule (240 mg) by mouth daily  docusate sodium (COLACE) 100 MG capsule, Take 100 mg by mouth 2 times daily as needed for constipation  empagliflozin (JARDIANCE) 10 MG TABS tablet, Take 1 tablet (10 mg) by mouth daily  gabapentin (NEURONTIN) 100 MG capsule, TAKE ONE CAPSULE BY MOUTH EVERY MORNING, 1 CAPSULE IN THE AFTERNOON, AND THEN TAKE 2 CAPSULES EVERY NIGHT AT BEDTIME  hydroxychloroquine (PLAQUENIL) 200 MG tablet, Take 1 tablet (200 mg) by mouth daily  loratadine (CLARITIN) 10 MG tablet, Take 10 mg by mouth daily  magnesium oxide 200 MG TABS, Take 200 mg by mouth daily bedtime  metolazone (ZAROXOLYN) 2.5 MG tablet, Take 1 tablet (2.5 mg) by mouth as needed (ONLY take if instructed to do so by your cardiology team.) (Patient not taking: Reported on 12/22/2023)  multivitamin, therapeutic with minerals (THERA-VIT-M) TABS tablet, Take 1 tablet by mouth daily  potassium chloride ER (KLOR-CON M) 20 MEQ CR tablet, Take 3 tablets (60 mEq) by mouth daily  predniSONE (DELTASONE) 1 MG tablet, Take 3 tablets (3 mg) by mouth daily - Oral  predniSONE (DELTASONE) 5 MG tablet, 10 mg when sick for 3 days or until back to baseline for secondary adrenal insufficiency (Patient not taking: Reported on 12/22/2023)  spironolactone (ALDACTONE) 25 MG tablet, Take 0.5 tablets (12.5 mg) by mouth daily  sulfamethoxazole-trimethoprim (BACTRIM DS) 800-160 MG tablet, Take 1 tablet by mouth 2 times daily (Patient not taking: Reported on 12/22/2023)  torsemide (DEMADEX) 20 MG tablet, Take 1 tablet (20 mg) by mouth daily  UNABLE TO FIND, MEDICATION NAME: Relief CBD soft gels (Patient not taking: Reported on 12/22/2023)  UNABLE TO FIND, MEDICATION NAME: CBD cream  Vitamin D (Cholecalciferol) 10 MCG (400 UNIT) TABS, Take 1 tablet by mouth daily  zoledronic  "Acid (RECLAST) 5 MG/100ML SOLN infusion,     No current facility-administered medications on file prior to visit.      ROS:   Refer to HPI    EXAM:  /79 (BP Location: Right arm, Patient Position: Chair, Cuff Size: Adult Regular)   Pulse 101   Ht 1.471 m (4' 9.91\")   Wt 49.2 kg (108 lb 6.4 oz)   SpO2 97%   BMI 22.72 kg/m     GENERAL: Appears comfortable, in no acute distress.   HEENT: Eye symmetrical, no discharge or icterus bilaterally. Mucous membranes moist and without lesions.  CV: tachycardic, irregular, +S1S2, systolic murmur, no rub, or gallop. JVP at clavicle    RESPIRATORY: Respirations regular, even, and unlabored. Lungs CTA throughout.   GI: Soft and non distended with normoactive bowel sounds present in all quadrants. No tenderness, rebound, guarding. No hepatomegaly.   EXTREMITIES: No peripheral edema. 2+ bilateral pedal pulses.   NEUROLOGIC: Alert and oriented x 3. No focal deficits.   MUSCULOSKELETAL: No joint swelling or tenderness.   SKIN: No jaundice. No rashes or lesions.     Labs, reviewed with patient in clinic today:  CBC RESULTS:  Lab Results   Component Value Date    WBC 6.0 12/22/2023    WBC Canceled, Test credited 03/16/2021    RBC 4.07 12/22/2023    RBC Canceled, Test credited 03/16/2021    HGB 14.2 12/22/2023    HGB Canceled, Test credited 03/16/2021    HCT 41.6 12/22/2023    HCT Canceled, Test credited 03/16/2021     (H) 12/22/2023    MCV Canceled, Test credited 03/16/2021    MCH 34.9 (H) 12/22/2023    MCH Canceled, Test credited 03/16/2021    MCHC 34.1 12/22/2023    MCHC Canceled, Test credited 03/16/2021    RDW 11.4 12/22/2023    RDW Canceled, Test credited 03/16/2021     12/22/2023    PLT Canceled, Test credited 03/16/2021       CMP RESULTS:  Lab Results   Component Value Date     (L) 12/22/2023     (L) 11/29/2022     06/23/2021    POTASSIUM 4.4 12/22/2023    POTASSIUM 3.5 04/10/2023    POTASSIUM 3.9 07/20/2022    POTASSIUM 4.2 06/23/2021    " CHLORIDE 96 (L) 12/22/2023    CHLORIDE 101 07/20/2022    CHLORIDE 102 06/23/2021    CO2 24 12/22/2023    CO2 28 07/20/2022    CO2 28 06/23/2021    ANIONGAP 13 12/22/2023    ANIONGAP 7 07/20/2022    ANIONGAP 6 06/23/2021     (H) 12/22/2023     (H) 04/17/2023     (H) 07/20/2022    GLC 98 06/23/2021    BUN 29.0 (H) 12/22/2023    BUN 31 (H) 07/20/2022    BUN 25 06/23/2021    CR 0.90 12/22/2023    CR 1.05 (H) 06/23/2021    GFRESTIMATED 71 12/22/2023    GFRESTIMATED 57 (L) 06/23/2021    GFRESTBLACK 67 06/23/2021    VIKTORIYA 9.2 12/22/2023    VIKTORIYA 10.0 06/23/2021    BILITOTAL 0.9 09/14/2023    BILITOTAL 1.2 06/23/2021    ALBUMIN 5.0 09/21/2023    ALBUMIN 4.4 04/20/2022    ALBUMIN 4.2 06/23/2021    ALKPHOS 116 09/14/2023    ALKPHOS 136 06/23/2021    ALT 22 09/21/2023    ALT 41 06/23/2021    AST 35 09/21/2023    AST 36 06/23/2021        INR RESULTS:  Lab Results   Component Value Date    INR 1.71 (H) 04/10/2023    INR 3.42 (H) 03/07/2018       Lab Results   Component Value Date    MAG 2.0 04/16/2023    MAG 1.9 04/12/2018     Lab Results   Component Value Date    NTBNPI 2,067 (H) 01/24/2023    NTBNPI 485 12/12/2019     Lab Results   Component Value Date    NTBNP 1,425 (H) 10/13/2021    NTBNP 1,274 (H) 05/26/2021       Cardiac Diagnostics:    11/16/2023 Device Interrogation   Remote pacemaker transmission received and reviewed. Device transmission sent per MD orders.     Device: Medtronic [id number] [place] XT DR MRI  Normal Device Function  Mode: DDDR  bpm  AP: 82.8%  : 98.1%  Presenting EGM: AP- @ 75 bpm  Short V-V intervals: 0  Lead Trends Appear Stable  Estimated battery longevity to RRT = 10 years. Battery voltage = 3.00 V.   Atrial Arrhythmia: 202 AT/AF episodes recorded - < 1 min - 13 hours 31 minutes.  AF [place]: 7.0%  Anticoagulant: Eliquis  Ventricular Arrhythmia: 8 NSVT episodes recorded - 1-2 seconds, 167-218 bpm.   2 VT episodes recorded - 5-7 seconds, 167-188 bpm.   Pt Notified of Transmission  Results: MyChart      5/24/2023 Echo   Interpretation Summary  Tricuspid valve replacement with 29mm [place] Epic.  Doppler interrogation of the tricspid valve is normal.  Global and regional left ventricular function is normal with an EF of 55-60%.  The right ventricle is normal size. Global right ventricular function is  mildly to moderately reduced.  Severe biatrial enlargement is present.  Mild to moderate mitral insufficiency is present.  IVC diameter >2.1 cm collapsing <50% with sniff suggests a high RA pressure  estimated at 15 mmHg or greater.  No pericardial effusion is present.  Compared to prior, RV appears slightly better, CVP is higher now.    4/17/2023 Echo   Interpretation Summary  Minimally invasive tricuspid valve replacement with 29mm [place] Sil[place]io Epic. Doppler  interrogation of the tricspid valve is normal. TV mean gradient 4-6 mmHg.  Moderate right ventricular dilation is present. Global right ventricular  function is mildly to moderately reduced.  Global and regional left ventricular function is normal with an EF of 60-65%.  Mild to moderate mitral insufficiency is present.  IVC diameter <2.1 cm collapsing >50% with sniff suggests a normal RA pressure  of 3 mmHg.  No pericardial effusion is present.  Compared to prior TVR is new.    2/7/2023 Device Interrogation   Device: [employer] [id number] [place] XT DR MRI  Normal Device Function.  Mode: VVIR  bpm  : 8.6%  Presenting EGM: Narrow Complex tachycardia @ 139 bpm  Short V-V intervals: 0  Lead Trends Appear Stable.  Estimated battery longevity to RRT = 11 years. Battery voltage = 3.02 V.  Atrial Arrhythmia: AFL  Anticoagulant: Apixaban  Ventricular Arrhythmia: 4 episodes lasting 1-4 sec 194-245 bpm  Transmission discussed with Maria Esther Cutler NP. Orders to increase diltiazem from 180 mg daily to 240 mg daily.    12/5/2022 ECHO  Interpretation Summary  Severe tricuspid insufficiency is present.  Moderate right ventricular dilation is present. Global  right ventricular  function is mildly reduced.  Global and regional left ventricular function is normal with an EF of 55-60%.  IVC diameter >2.1 cm collapsing <50% with sniff suggests a high RA pressure  estimated at 15 mmHg or greater.  No pericardial effusion is present.  No significant changes noted.    11/28/2022 RHC with coronary angiogram   Right sided filling pressures are severely elevated.  Mild elevated pulmonary hypertension.  Left sided filling pressures are moderately elevated.  Reduced cardiac output level.  Hemodynamic data has been modified in Epic per physician review.  Prox LAD lesion is 30% stenosed.     Mild non-obstructive coronary artery disease        Assessment and Plan:   Ms. 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 She was recently admitted 11/19/-12/5/22 with ADHF and frequent NSVT, now s/p VT ablation. She presents to CORE for hospital follow up.     Overall, appearing well. Weights are stable, euvolemic by exam. Review of today's labs demonstrate stable renal function and normal lytes. Functional class I-II symptoms. From a cardiac perspective she is of acceptable risk to proceed with ortho surgery. No further cardiac testing needed.  Recommend bridging with lovenox 3 days prior to surgery. Hold jardiance 3 days prior to surgery as well.     # Pre-operative Cardiac Clearance  Risk factors for perioperative MACE include compensated diastolic heart failure,atrial arrhythmias (AF/AFL), and immediate risk surgery. While her activity is somewhat limited due ortho issues, I believe that her  functional status is satisfactory and that she is able to achieve > 4 METS, thus RCRI risk score is 1. This score corresponds with a 6% risk of periprocedural MACE.   - Overall, low risk for coronary events or arrhythmia. Of acceptable risk to proceed with ortho surgery.   - She is on eliquis for high burden of AF/AFL. Recommend bridging with lovenox 3 days prior to surgery. Resume eliquis post-op.     # Acute on chronic diastolic heart failure with dilated and mildly reduced RV function (LVEF 55-60%)  Pre-Ablation: 11/28/22 RHC: CVP 24, PA 48/27/34, PCWP 23, SVO2 46, CI 1.1, CO 1.6  Post Ablation and Diuresis: 12/3/22 RHC: CVP 16, PA 41/22/28, PCWP 15, SVO2 69, CI 2.1, CO 3.0    NYHA Class III, AHA/ACC Stage C  Rate control: 101, continue diltiazem   volume status: euvolemic, continue torsemide 20 mg daily    Blood pressure control:  Diltiazem 240 mg daily   Aldosterone antagonist: Aldactone 12.5 mg daily   SGLT2:  Empagliflozin 10 mg daily   Evaluation of coronary arteries: 11/28/22 angiogram mild non-obstructive CAD     # Frequent, non-sustained ventricular tachycardia s/p VT Ablation (by Dr. Eaton on 12/1/22)  - Anti-arrhythmic: none, s/p VT ablation  - Stopped PTA digoxin per EP recommendations  - Anticoagulation: given history of Afib, continue Apixaban 5 mg bid  - Follow up with EP specialists (Dr. Eaton and/or Maria Esther Cutler NP)    # HTN Intolerant to Metoprolol in the past.   - Diltiazem 240 mg daily, spironolactone 12.5 mg daily       # Aflutter. History of SSS s/p PPM 12/19. Long standing history of atrial arrythmias.  - Diltiazem 240 mg daily  - Continue Eliquis.   - Follow up with EP as scheduled.      # VSD s/p repair.     # History of exudative pericardial effusion.   - Stable per CT 3/19.     # Severe TR per TTE 11/2022 s/p TVR on 4/10/23  - continue ASA 81 mg daily         Follow up:  - CORE 3/20/24        Elmira Vasquez DNP, NP-C  Advance Heart Failure  1/11/2024

## 2024-01-11 NOTE — PATIENT INSTRUCTIONS
Take your medicines every day, as directed    Changes made today:  No medication changes  Plan to bridge with lovenox prior to ortho surgery. Last dose of eliquis on 1/20. On 1/21 start lovenox injections twice daily x 3 days leading up to surgery on 1/24. Plan to resume eliquis after surgery.    Monitor Your Weight and Symptoms    Contact us if you:    Gain 2 pounds in one day or 5 pounds in one week  Feel more short of breath  Notice more leg swelling  Feel lightheadeded   Change your lifestyle    Limit Salt or Sodium:  2000 mg  Limit Fluids:  2000 mL or approximately 64 ounces  Eat a Heart Healthy Diet  Low in saturated fats  Stay Active:  Aim to move at least 150 minutes every  week         To Contact us    During Business Hours:  [phone], option # 1      After hours, weekends or holidays:   [phone], Option #4  Ask to speak to the On-Call Cardiologist. Inform them you are a CORE/heart failure patient at the [facility].     Use [employer] allows you to communicate directly with your heart team through secure messaging.  [employer] can be accessed any time on your phone, computer, or tablet.  If you need assistance, we'd be happy to help!         Keep your Heart Appointments:    CORE with Elmira Vasquez NP 3/20/24     Please consider attending our virtual support group which is held monthly. Please reach out to Jesus at [phone] for more information if you are interested in attending.     2024 dates:    Monday, January 8th, 1-2pm     Monday, February 5th , 1-2pm     Monday, March 4th , 1-2pm     Monday, April 1st, 1-2pm     Monday, May 6th, 1-2pm     Monday, June 3rd, 1-2pm     Monday, July 1st, 1-2pm     Monday, August 5th, 1-2pm     Monday, September 9th, 1-2pm     Monday, October 7th, 1-2pm     Monday, November 4th, 1-2pm     Monday, December 2nd, 1-2pm

## 2024-01-15 ENCOUNTER — ANCILLARY PROCEDURE (OUTPATIENT)
Dept: GENERAL RADIOLOGY | Facility: CLINIC | Age: 64
End: 2024-01-15
Attending: PHYSICIAN ASSISTANT
Payer: COMMERCIAL

## 2024-01-15 ENCOUNTER — OFFICE VISIT (OUTPATIENT)
Dept: FAMILY MEDICINE | Facility: CLINIC | Age: 64
End: 2024-01-15
Payer: COMMERCIAL

## 2024-01-15 VITALS
DIASTOLIC BLOOD PRESSURE: 79 MMHG | SYSTOLIC BLOOD PRESSURE: 124 MMHG | HEART RATE: 81 BPM | BODY MASS INDEX: 23.17 KG/M2 | RESPIRATION RATE: 17 BRPM | HEIGHT: 57 IN | TEMPERATURE: 97.8 F | OXYGEN SATURATION: 98 % | WEIGHT: 107.4 LBS

## 2024-01-15 DIAGNOSIS — I10 PRIMARY HYPERTENSION: Chronic | ICD-10-CM

## 2024-01-15 DIAGNOSIS — T38.0X5A STEROID-INDUCED OSTEOPOROSIS: ICD-10-CM

## 2024-01-15 DIAGNOSIS — Z01.818 PREOP GENERAL PHYSICAL EXAM: Primary | ICD-10-CM

## 2024-01-15 DIAGNOSIS — I48.92 ATRIAL FLUTTER, UNSPECIFIED TYPE (H): ICD-10-CM

## 2024-01-15 DIAGNOSIS — Z95.4 S/P TVR (TRICUSPID VALVE REPLACEMENT): ICD-10-CM

## 2024-01-15 DIAGNOSIS — I50.9 CHRONIC CONGESTIVE HEART FAILURE, UNSPECIFIED HEART FAILURE TYPE (H): ICD-10-CM

## 2024-01-15 DIAGNOSIS — M16.12 PRIMARY OSTEOARTHRITIS OF LEFT HIP: ICD-10-CM

## 2024-01-15 DIAGNOSIS — M81.8 STEROID-INDUCED OSTEOPOROSIS: ICD-10-CM

## 2024-01-15 DIAGNOSIS — N18.32 STAGE 3B CHRONIC KIDNEY DISEASE (H): ICD-10-CM

## 2024-01-15 DIAGNOSIS — D69.6 THROMBOCYTOPENIA, UNSPECIFIED (H): ICD-10-CM

## 2024-01-15 DIAGNOSIS — M06.9 RHEUMATOID ARTHRITIS OF BOTH ANKLES, UNSPECIFIED WHETHER RHEUMATOID FACTOR PRESENT (H): Chronic | ICD-10-CM

## 2024-01-15 DIAGNOSIS — Q76.6 ANOMALY OF RIB: ICD-10-CM

## 2024-01-15 DIAGNOSIS — I74.9 EMBOLISM AND THROMBOSIS OF UNSPECIFIED ARTERY (H): Chronic | ICD-10-CM

## 2024-01-15 DIAGNOSIS — I48.0 PAROXYSMAL ATRIAL FIBRILLATION (H): Chronic | ICD-10-CM

## 2024-01-15 DIAGNOSIS — C83.398 DIFFUSE LARGE B-CELL LYMPHOMA OF EXTRANODAL SITE: ICD-10-CM

## 2024-01-15 DIAGNOSIS — Z86.74 H/O CARDIAC ARREST: ICD-10-CM

## 2024-01-15 LAB
BASOPHILS # BLD AUTO: 0 10E3/UL (ref 0–0.2)
BASOPHILS NFR BLD AUTO: 1 %
EOSINOPHIL # BLD AUTO: 0 10E3/UL (ref 0–0.7)
EOSINOPHIL NFR BLD AUTO: 1 %
ERYTHROCYTE [DISTWIDTH] IN BLOOD BY AUTOMATED COUNT: 11.9 % (ref 10–15)
HCT VFR BLD AUTO: 45.4 % (ref 35–53)
HGB BLD-MCNC: 15.2 G/DL (ref 11.7–17.7)
IMM GRANULOCYTES # BLD: 0 10E3/UL
IMM GRANULOCYTES NFR BLD: 0 %
LYMPHOCYTES # BLD AUTO: 0.5 10E3/UL (ref 0.8–5.3)
LYMPHOCYTES NFR BLD AUTO: 10 %
MCH RBC QN AUTO: 34.9 PG (ref 26.5–33)
MCHC RBC AUTO-ENTMCNC: 33.5 G/DL (ref 31.5–36.5)
MCV RBC AUTO: 104 FL (ref 78–100)
MONOCYTES # BLD AUTO: 0.5 10E3/UL (ref 0–1.3)
MONOCYTES NFR BLD AUTO: 10 %
NEUTROPHILS # BLD AUTO: 4.3 10E3/UL (ref 1.6–8.3)
NEUTROPHILS NFR BLD AUTO: 79 %
PLATELET # BLD AUTO: 139 10E3/UL (ref 150–450)
RBC # BLD AUTO: 4.35 10E6/UL (ref 3.8–5.9)
WBC # BLD AUTO: 5.4 10E3/UL (ref 4–11)

## 2024-01-15 PROCEDURE — 72040 X-RAY EXAM NECK SPINE 2-3 VW: CPT | Mod: TC | Performed by: PREVENTIVE MEDICINE

## 2024-01-15 PROCEDURE — 36415 COLL VENOUS BLD VENIPUNCTURE: CPT | Performed by: PHYSICIAN ASSISTANT

## 2024-01-15 PROCEDURE — 99214 OFFICE O/P EST MOD 30 MIN: CPT | Performed by: PHYSICIAN ASSISTANT

## 2024-01-15 PROCEDURE — 71101 X-RAY EXAM UNILAT RIBS/CHEST: CPT | Mod: TC | Performed by: RADIOLOGY

## 2024-01-15 PROCEDURE — 85025 COMPLETE CBC W/AUTO DIFF WBC: CPT | Performed by: PHYSICIAN ASSISTANT

## 2024-01-15 RX ORDER — ALENDRONATE SODIUM 70 MG/1
70 TABLET ORAL
Qty: 12 TABLET | Refills: 3 | OUTPATIENT
Start: 2024-01-15

## 2024-01-15 RX ORDER — RESPIRATORY SYNCYTIAL VIRUS VACCINE 120MCG/0.5
0.5 KIT INTRAMUSCULAR ONCE
Qty: 1 EACH | Refills: 0 | Status: CANCELLED | OUTPATIENT
Start: 2024-01-15 | End: 2024-01-15

## 2024-01-15 NOTE — PATIENT INSTRUCTIONS
Stop aspirin 7 days prior   Recommend bridging with lovenox 3 days prior to surgery. Hold jardiance 3 days prior to surgery as well.

## 2024-01-15 NOTE — TELEPHONE ENCOUNTER
Requested Prescriptions   Pending Prescriptions Disp Refills    alendronate (FOSAMAX) 70 MG tablet 12 tablet 3     Sig: Take 1 tablet (70 mg) by mouth every 7 days       Bisphosphonates Passed - 1/15/2024  7:37 AM        Passed - Dexa scan completed in the past 48-months     Please review last Dexa result.           Passed - Medication is active on med list        Passed - Medication indicated for associated diagnosis     The medication is prescribed for one or more of the following conditions:     Osteoporosis   Osteitis Deformans (Paget's Disease)   Postmenopausal    Osteopenia   Arthroplasty   Crohn's Disease   Cystic Fibrosis   Fibrous Dysplasia of bone   Growth Hormone Deficiency   Hypercalcemia   Juvenile Osteoporosis   Hypogonadism          Passed - Recent (12 mo) or future (90 days) visit within the authorizing provider's specialty     The patient must have completed an in-person or virtual visit within the past 12 months or has a future visit scheduled within the next 90 days with the authorizing provider s specialty.  Urgent care and e-visits do not quality as an office visit for this protocol.          Passed - Patient is age 18 or older        Passed - Normal Creatinine Clearance  within past 12 months     Recent Labs   Lab Test 01/11/24  1123 09/10/19  1215 09/10/19  1035   CR 0.93   < >  --    CREAT  --   --  0.9    < > = values in this interval not displayed.       Ok to refill medication if creatinine is low

## 2024-01-15 NOTE — PROGRESS NOTES
[facility]  [address] [address]  Phone: [phone]  Primary Provider: Alexander Prado  Pre-op Performing Provider: ALEXANDER PRADO      PREOPERATIVE EVALUATION:  Today's date: 1/15/2024    Amelia is a 63 year old, presenting for the following:  Pre-Op Exam        1/15/2024     1:52 PM   Additional Questions   Roomed by Coleen   Accompanied by Self       Surgical Information:  Surgery/Procedure: LEFT TOTAL HIP ARTHROPLASTY DIRECT ANTERIOR APPROACH ORTHOGRID   Surgery Location: [facility]  Surgeon: Dr. Romeo Quintana  Surgery Date: 1/24/2024  Time of Surgery: 11:20 am  Where patient plans to recover: At a TCU (Transitional Care Unit)  Fax number for surgical facility: Note does not need to be faxed, will be available electronically in Epic.    Assessment & Plan     The proposed surgical procedure is considered INTERMEDIATE risk.      ICD-10-CM    1. Preop general physical exam  Z01.818 CBC with platelets and differential     CBC with platelets and differential      2. Primary osteoarthritis of left hip  M16.12       3. Rheumatoid arthritis of both ankles, unspecified whether rheumatoid factor present (H)  M06.9 XR Cervical Spine 2/3 Views      4. Paroxysmal atrial fibrillation (H)  I48.0       5. Primary hypertension  I10       6. Chronic congestive heart failure, unspecified heart failure type (H)  I50.9       7. Embolism and thrombosis of unspecified artery (H)  I74.9       8. Atrial flutter, unspecified type (H)  I48.92       9. Stage 3b chronic kidney disease (H)  N18.32       10. Thrombocytopenia, unspecified (H24)  D69.6       11. Diffuse large B-cell lymphoma of extranodal site (H)  C83.39       12. S/P TVR (tricuspid valve replacement)  Z95.4       13. H/O cardiac arrest  Z86.74                Implanted Device:   - Type of device: tricuspid tissue valve Patient advised to bring device information on day of surgery.   - Type of device: pacemaker Patient advised  to bring device information on day of surgery.    Risks and Recommendations:  The patient has the following additional risks and recommendations for perioperative complications:   - Consult Hospitalist / IM to assist with post-op medical management   - Recurrent use of steroids  On daily prednisone 3 mg. Does not meet requirement for prophylactic steroid burst. But monitor closely in post op period for signs of adrenal insufficiency   Cardiovascular:   - Cardiology consulted - cleared by cardiology 1/11/24  Anemia/Bleeding/Clotting:    - Significant bleeding history   - History of DVT or PE, consider DVT prevention postoperatively   - thrombocytopenia: stable, with platelets 139  CKD: stable: creatinine 0.93  Review C spine x-ray due to RA: exaggerated cervical lordosis, advanced DDD      Antiplatelet or Anticoagulation Medication Instructions:   - aspirin: Discontinue aspirin 7-10 days prior to procedure to reduce bleeding risk. It should be resumed postoperatively.    - apixaban (Eliquis), edoxaban (Savaysa), rivaroxaban (Xarelto):   Hold as per cardiology. Bridge with lovenox.    Additional Medication Instructions:  Patient is to take all scheduled medications on the day of surgery EXCEPT for modifications listed below:   - Calcium Channel Blockers: May be continued on the day of surgery.   - Diuretics: May continue due to heart failure.   - SGLT2 Inhibitor (canagliflozin, dapagliflozin, or empagliflozin): HOLD 3 days before surgery.    - hydroxychloroquine: continue without modification   - bisphosphonates (alendronate, ibandronate, risedronate): not scheduled day of surgery, continue as planned   - pregabalin, gabapentin: Continue without modification.    RECOMMENDATION:  APPROVAL GIVEN to proceed with proposed procedure, without further diagnostic evaluation.    I spent a total of 32 minutes on the day of the visit.   Time spent by me doing chart review, history and exam, documentation and further activities  per the note     Subjective       HPI related to upcoming procedure:  chronic arthritis/hip pain, dislocated hip, with worsening pain.        1/15/2024     6:41 AM   Preop Questions   1. Have you ever had a heart attack or stroke? No   2. Have you ever had surgery on your heart or blood vessels, such as a stent placement, a coronary artery bypass, or surgery on an artery in your head, neck, heart, or legs? YES - see chart   3. Do you have chest pain with activity? No   4. Do you have a history of  heart failure? YES - follows with cardiology    5. Do you currently have a cold, bronchitis or symptoms of other infection? No   6. Do you have a cough, shortness of breath, or wheezing? No   7. Do you or anyone in your family have previous history of blood clots? YES - DVT from PICC line around 2017   8. Do you or does anyone in your family have a serious bleeding problem such as prolonged bleeding following surgeries or cuts? YES - thrombocytopenia and blood thinners   9. Have you ever had problems with anemia or been told to take iron pills? YES - due to lymphoma. Normal recently.   10. Have you had any abnormal blood loss such as black, tarry or bloody stools, or abnormal vaginal bleeding? No   11. Have you ever had a blood transfusion? YES - open heart surgery and with chemo 2017   11a. Have you ever had a transfusion reaction? YES - reacted to platelets with hives, is premedicated with tylenol and benadryl   12. Are you willing to have a blood transfusion if it is medically needed before, during, or after your surgery? Yes   13. Have you or any of your relatives ever had problems with anesthesia? YES - nausea from anesthesia, needs zofran   14. Do you have sleep apnea, excessive snoring or daytime drowsiness? No   15. Do you have any artifical heart valves or other implanted medical devices like a pacemaker, defibrillator, or continuous glucose monitor? YES - valve   15a. What type of device do you have? Tricuspid   st tobias tissue valve. Pacemaker   15b. Name of the clinic that manages your device:  [facility]   16. Do you have artificial joints? No   17. Are you allergic to latex? No       Health Care Directive:  Patient has a Health Care Directive on file      Preoperative Review of :   reviewed - controlled substances reflected in medication list.      Status of Chronic Conditions:  See problem list for active medical problems.  Problems all longstanding and stable, except as noted/documented.  See ROS for pertinent symptoms related to these conditions.      From cardiology visit 1/11/24  Assessment and Plan:   Ms. 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 She was recently admitted 11/19/-12/5/22 with ADHF and frequent NSVT, now s/p VT ablation. [place] [facility] [place]      Overall, appearing well. Weights are stable, euvolemic by exam. Review of today's labs demonstrate stable renal function and normal lytes. Functional class I-II symptoms. From a cardiac perspective she is of acceptable risk to proceed with ortho surgery. No further cardiac testing needed.  Recommend bridging with lovenox 3 days prior to surgery. Hold jardiance 3 days prior to surgery as well.      # Pre-operative Cardiac Clearance  Risk factors for perioperative MACE include compensated diastolic heart failure,atrial arrhythmias (AF/AFL), and immediate risk surgery. While her activity is somewhat limited due ortho issues, I believe that her functional status is satisfactory and that she is able to achieve > 4  METS, thus RCRI risk score is 1. This score corresponds with a 6% risk of periprocedural MACE.   - Overall, low risk for coronary events or arrhythmia. Of acceptable risk to proceed with ortho surgery.   - She is on eliquis for high burden of AF/AFL. Recommend bridging with lovenox 3 days prior to surgery. Resume eliquis post-op.      # Acute on chronic diastolic heart failure with dilated and mildly reduced RV function (LVEF 55-60%)  Pre-Ablation: 11/28/22 RHC: CVP 24, PA 48/27/34, PCWP 23, SVO2 46, CI 1.1, CO 1.6  Post Ablation and Diuresis: 12/3/22 RHC: CVP 16, PA 41/22/28, PCWP 15, SVO2 69, CI 2.1, CO 3.0     NYHA Class III, AHA/ACC Stage C  Rate control: 101, continue diltiazem   volume status: euvolemic, continue torsemide 20 mg daily    Blood pressure control:  Diltiazem 240 mg daily   Aldosterone antagonist: Aldactone 12.5 mg daily   SGLT2:  Empagliflozin 10 mg daily   Evaluation of coronary arteries: 11/28/22 angiogram mild non-obstructive CAD     # Frequent, non-sustained ventricular tachycardia s/p VT Ablation (by Dr. Eaton on 12/1/22)  - Anti-arrhythmic: none, s/p VT ablation  - Stopped PTA digoxin per EP recommendations  - Anticoagulation: given history of Afib, continue Apixaban 5 mg bid  - Follow up with EP specialists (Dr. Eaton and/or Maria Esther Cutler NP)     # HTN Intolerant to Metoprolol in the past.   - Diltiazem 240 mg daily, spironolactone 12.5 mg daily       # Aflutter. History of SSS s/p PPM 12/19. Long standing history of atrial arrythmias.  - Diltiazem 240 mg daily  - Continue Eliquis.   - Follow up with EP as scheduled.      # VSD s/p repair.      # History of exudative pericardial effusion.   - Stable per CT 3/19.     # Severe TR per TTE 11/2022 s/p TVR on 4/10/23  - continue ASA 81 mg daily     Review of Systems  CONSTITUTIONAL: NEGATIVE for fever, chills, change in weight  INTEGUMENTARY/SKIN: NEGATIVE for worrisome rashes, moles or lesions  EYES: NEGATIVE for vision changes or  irritation  ENT/MOUTH: NEGATIVE for ear, mouth and throat problems  RESP: NEGATIVE for significant cough or SOB  CV: NEGATIVE for chest pain, palpitations or peripheral edema  GI: NEGATIVE for nausea, abdominal pain, heartburn, or change in bowel habits  : NEGATIVE for frequency, dysuria, or hematuria  MUSCULOSKELETAL:POSITIVE  for left hip pain  NEURO: NEGATIVE for weakness, dizziness or paresthesias  ENDOCRINE: NEGATIVE for temperature intolerance, skin/hair changes  HEME: NEGATIVE for bleeding problems  PSYCHIATRIC: NEGATIVE for changes in mood or affect    Patient Active Problem List    Diagnosis Date Noted    Falls frequently 12/23/2023     Priority: Medium    Prediabetes 12/23/2023     Priority: Medium    S/P TVR (tricuspid valve replacement) 04/27/2023     Priority: Medium    Hypervolemia, unspecified hypervolemia type 01/24/2023     Priority: Medium    Edema, unspecified type 01/24/2023     Priority: Medium    Heart failure with preserved ejection fraction, NYHA class I (H) 01/24/2023     Priority: Medium    Severe tricuspid regurgitation 12/19/2022     Priority: Medium    Hyponatremia 11/19/2022     Priority: Medium    Acute on chronic diastolic heart failure (H) 11/19/2022     Priority: Medium    Iatrogenic cushingoid features (H24) 06/09/2022     Priority: Medium    Steroid-induced osteoporosis 06/09/2022     Priority: Medium    Chronic kidney disease, stage 3 10/16/2021     Priority: Medium    Heart palpitations 12/12/2019     Priority: Medium    Bradycardia 12/12/2019     Priority: Medium     Added automatically from request for surgery [id number]      Mild protein-calorie malnutrition (H24) 10/29/2019     Priority: Medium    Embolism and thrombosis of unspecified artery (H) 10/29/2019     Priority: Medium    Thrombocytopenia, unspecified (H24) 10/29/2019     Priority: Medium    Cervical cancer screening 10/18/2018     Priority: Medium     2011, 2015 NIL paps  10/18/2018:NIL pap, Neg HPV Pap screening  intervals discussed with oncologist, every 3 years should be fine. Magda Stringer PA-C   10/15/21 NIL pap, Neg HPV-every 3 year cotest due to immunopsupression      Atrial flutter, unspecified type (H) 05/17/2018     Priority: Medium    Paroxysmal atrial fibrillation (H) 05/11/2018     Priority: Medium    CHF (congestive heart failure) (H) 03/15/2018     Priority: Medium    H/O cardiac arrest 09/26/2017     Priority: Medium    DLBCL (diffuse large B cell lymphoma) (H) 09/07/2017     Priority: Medium    Physical deconditioning 08/12/2017     Priority: Medium    Febrile neutropenia  (H24) 08/08/2017     Priority: Medium    Diffuse large B cell lymphoma (H) 08/04/2017     Priority: Medium    Diffuse large B-cell lymphoma of extranodal site (H) 07/27/2017     Priority: Medium    Critical illness myopathy 06/16/2017     Priority: Medium    Cardiogenic shock (H) 05/29/2017     Priority: Medium    Atrial tachycardia 05/16/2017     Priority: Medium    Lymphedema of both lower extremities 04/04/2017     Priority: Medium    RA (rheumatoid arthritis) (H) 05/02/2016     Priority: Medium    Hyperlipidemia LDL goal <130 02/22/2011     Priority: Medium    HTN (hypertension)      Priority: Medium    Primary pulmonary hypertension (H)      Priority: Medium     Seeing [facility].         Past Medical History:   Diagnosis Date    Arthritis     Cancer (H) June 2017    DLBCL currently in remission    Cardiac arrest (H)     History of blood clots 2017    PICC line Right arm     History of chemotherapy     History of transfusion     Hypertension     Neuropathy     Physical deconditioning     PONV (postoperative nausea and vomiting)     Positive PPD, 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    VSD (ventricular septal defect)      Past Surgical History:   Procedure Laterality Date    ANESTHESIA CARDIOVERSION N/A 5/17/2017    Procedure: ANESTHESIA CARDIOVERSION;  ANESTHESIA CARDIOVERSION;  Surgeon: GENERIC ANESTHESIA PROVIDER;  Location: [facility] OR    ANESTHESIA  CARDIOVERSION  3/12/2018    Procedure: ANESTHESIA CARDIOVERSION;;  Surgeon: GENERIC ANESTHESIA PROVIDER;  Location: [facility] OR    ANESTHESIA CARDIOVERSION N/A 3/23/2018    Procedure: ANESTHESIA CARDIOVERSION;  Anesthesia Cardioversion ;  Surgeon: GENERIC ANESTHESIA PROVIDER;  Location: [facility] OR    ANESTHESIA CARDIOVERSION N/A 4/12/2018    Procedure: ANESTHESIA CARDIOVERSION;  Cardioversion;  Surgeon: GENERIC ANESTHESIA PROVIDER;  Location: [facility] OR    ARTHRODESIS WRIST  2000    Right wrist    BONE MARROW ASPIRATE &BIOPSY  7/12/2017         BONE MARROW BIOPSY W/ ASPIRATION  07/12/2017    CARDIOVERSION      5/17/17, 3/12/18, 3/23/18, 4/14/18,     COLONOSCOPY N/A 11/19/2019    Procedure: Colonoscopy, With Polypectomy And Biopsy;  Surgeon: Pj Vasques MD;  Location[facility]    CV CORONARY ANGIOGRAM N/A 11/28/2022    Procedure: Coronary Angiogram;  Surgeon: Sundar Wood MD;  Location:  HE[facility] CARDIAC CATH LAB    CV RIGHT HEART CATH MEASUREMENTS RECORDED N/A 11/28/2022    Procedure: Right Heart Catheterization;  Surgeon: Sundar Wood MD;  Location: [facility] CARDIAC CATH LAB    EP ABLATION PVC N/A 12/1/2022    Procedure: Ablation Premature Ventricular Contractions;  Surgeon: Juan Eaton MD;  Location: [facility] CARDIAC CATH LAB    EP PACEMAKER N/A 12/13/2019    Procedure: EP Pacemaker;  Surgeon: Pj Trent MD;  Location: [facility] CARDIAC CATH LAB    EXCISE LESION UPPER EXTREMITY Left 5/22/2018    Procedure: EXCISE LESION UPPER EXTREMITY;  Left Arm Wound Excision And Closure, Possible Submuscular Transposition of Ulnar Nerve  (Choice Anesthesia);  Surgeon: Kevin Sheldon MD;  Location:  OR    FOOT SURGERY      4 left and 2 right    FOOT SURGERY      4 Left and 2 Right     IMPLANT PACEMAKER  12/13/2019    Dr China Trent  [facility] Cardiac Cath lab     IMPLANT PACEMAKER      RELEASE CARPAL TUNNEL Bilateral     RELEASE CARPAL TUNNEL BILATERAL      REPAIR VALVE TRICUSPID MINIMALLY INVASIVE N/A  4/10/2023    Procedure: MINIMALLY INVASIVE TRICUSPID VALVE REPLACEMENT USING 29MM ST. [place]IL[place] EPIC VALVE, FEMORAL CANNULATION AND RIGHT GROIN CUTDOWN, CARDIOPULMONARY BYPASS, TRANSESOPHAGEAL ECHOCARDIOGRAM PERFORMED BY ANESTHESIA STAFF;  Surgeon: Chilango Cope MD;  Location: [facility] OR    REPAIR VENTRICULAR SEPTAL DEFECT  1969    TRANSPOSITION ULNAR NERVE (ELBOW) Left 5/22/2018    Procedure: TRANSPOSITION ULNAR NERVE (ELBOW);;  Surgeon: Kevin Sheldon MD;  Location: [facility] OR    ULNAR NERVE TRANSPOSITION Left 05/22/2018    VSD REPAIR  1969    ZZC FUSION FOOT BONES,SUBTALAR Right 10/20/2020    Procedure: RIGHT SUBTALAR JOINT FUSION AND CALCANEOCUBOID FUSION;  Surgeon: Nemesio Crow MD;  Location: [facility] Main OR;  Service: Orthopedics     Current Outpatient Medications   Medication Sig Dispense Refill    alendronate (FOSAMAX) 70 MG tablet Take 1 tablet (70 mg) by mouth every 7 days (Patient taking differently: Take 70 mg by mouth every 7 days Mondays) 12 tablet 3    apixaban ANTICOAGULANT (ELIQUIS ANTICOAGULANT) 5 MG tablet Take 1 tablet (5 mg) by mouth 2 times daily 180 tablet 3    aspirin (ASA) 81 MG chewable tablet 1 tablet (81 mg) by Oral or NG Tube route daily 30 tablet 2    calcium carbonate 600 mg-vitamin D 400 units (CALTRATE) 600-400 MG-UNIT per tablet Take 2 tablets by mouth daily      diltiazem ER (DILT-XR) 240 MG 24 hr ER beaded capsule Take 1 capsule (240 mg) by mouth daily 90 capsule 3    docusate sodium (COLACE) 100 MG capsule Take 100 mg by mouth 2 times daily as needed for constipation      empagliflozin (JARDIANCE) 10 MG TABS tablet Take 1 tablet (10 mg) by mouth daily 90 tablet 3    [START ON 1/21/2024] enoxaparin ANTICOAGULANT (LOVENOX) 40 MG/0.4ML syringe Inject 0.4 mLs (40 mg) Subcutaneous 2 times daily 2.4 mL 0    gabapentin (NEURONTIN) 100 MG capsule TAKE ONE CAPSULE BY MOUTH EVERY MORNING, 1 CAPSULE IN THE AFTERNOON, AND THEN TAKE 2 CAPSULES EVERY NIGHT AT BEDTIME 360 capsule 3     hydroxychloroquine (PLAQUENIL) 200 MG tablet Take 1 tablet (200 mg) by mouth daily 90 tablet 1    loratadine (CLARITIN) 10 MG tablet Take 10 mg by mouth daily      magnesium oxide 200 MG TABS Take 200 mg by mouth daily bedtime      multivitamin, therapeutic with minerals (THERA-VIT-M) TABS tablet Take 1 tablet by mouth daily 30 each 0    potassium chloride ER (KLOR-CON M) 20 MEQ CR tablet Take 3 tablets (60 mEq) by mouth daily 270 tablet 3    predniSONE (DELTASONE) 1 MG tablet Take 3 tablets (3 mg) by mouth daily - Oral 270 tablet 1    spironolactone (ALDACTONE) 25 MG tablet Take 0.5 tablets (12.5 mg) by mouth daily 90 tablet 1    torsemide (DEMADEX) 20 MG tablet Take 1 tablet (20 mg) by mouth daily 90 tablet 3    UNABLE TO FIND MEDICATION NAME: Relief CBD soft gels      UNABLE TO FIND MEDICATION NAME: CBD cream      Vitamin D (Cholecalciferol) 10 MCG (400 UNIT) TABS Take 1 tablet by mouth daily      acetaminophen (TYLENOL) 325 MG tablet Take 2 tablets (650 mg) by mouth every 4 hours as needed for other (For optimal non-opioid multimodal pain management to improve pain control.) (Patient not taking: Reported on 12/22/2023) 100 tablet 1    dexamethasone (DECADRON) 4 MG/ML injection Inject 4 mg IM for adrenal crisis (Patient not taking: Reported on 12/22/2023) 2 mL 3    metolazone (ZAROXOLYN) 2.5 MG tablet Take 1 tablet (2.5 mg) by mouth as needed (ONLY take if instructed to do so by your cardiology team.) (Patient not taking: Reported on 12/22/2023) 5 tablet 0    predniSONE (DELTASONE) 5 MG tablet 10 mg when sick for 3 days or until back to baseline for secondary adrenal insufficiency (Patient not taking: Reported on 12/22/2023) 30 tablet 3    sulfamethoxazole-trimethoprim (BACTRIM DS) 800-160 MG tablet Take 1 tablet by mouth 2 times daily (Patient not taking: Reported on 12/22/2023) 4 tablet 5    zoledronic Acid (RECLAST) 5 MG/100ML SOLN infusion  (Patient not taking: Reported on 1/11/2024)         Allergies  "  Allergen Reactions    Amiodarone Other (See Comments) and Difficulty breathing     Lethargic and had trouble breathing- occurred in 2017    Blood Transfusion Related (Informational Only) Hives     Hives with platelets. Give benadryl premedication.    Metoprolol Other (See Comments)     Pt and  report that metoprolol does not work for her and also reports feeling unwell with this medication. She has been able to tolerate atenolol, which as worked in controlling her HR.     Other [No Clinical Screening - See Comments]      Penicillin Allergy Skin Test not performed, see antimicrobial management team progress note 7/5/17.      Penicillins      Childhood reaction, concern for tongue swelling    Tape [Adhesive Tape] Rash        Social History     Tobacco Use    Smoking status: Never    Smokeless tobacco: Never   Substance Use Topics    Alcohol use: Yes     Comment: none     Family History   Problem Relation Age of Onset    Breast Cancer Mother         60s    Hypertension Mother     Alzheimer Disease Mother     Cerebrovascular Disease Mother     Hypertension Father     Cerebrovascular Disease Father     Atrial fibrillation Father     Lymphoma Father     Prostate Cancer Father     Other Cancer Father         some form of Lymphoma    Alzheimer Disease Maternal Grandmother         likely    Arthritis Maternal Grandfather     Pneumonia Maternal Grandfather     Diabetes Paternal Grandmother     Mental Illness Sister         early onset  alzheimers     History   Drug Use Unknown         Objective     /79   Pulse 81   Temp 97.8  F (36.6  C) (Tympanic)   Resp 17   Ht 1.448 m (4' 9.01\")   Wt 48.7 kg (107 lb 6.4 oz)   SpO2 98%   BMI 23.23 kg/m      Physical Exam    GENERAL APPEARANCE: healthy, alert and no distress     EYES: EOMI, PERRL     HENT: ear canals and TM's normal and nose and mouth without ulcers or lesions     NECK: no adenopathy, no asymmetry, masses, or scars and thyroid normal to palpation     " RESP: lungs clear to auscultation - no rales, rhonchi or wheezes     CV: regular rates and rhythm, normal S1 S2, no S3 or S4 and no murmur, click or rub     ABDOMEN:  soft, nontender, no HSM or masses and bowel sounds normal     MS: extremities normal- no gross deformities noted, no evidence of inflammation in joints, FROM in all extremities.     SKIN: no suspicious lesions or rashes     NEURO: Normal strength and tone, sensory exam grossly normal, mentation intact and speech normal     PSYCH: mentation appears normal. and affect normal/bright     LYMPHATICS: No cervical adenopathy    Recent Labs   Lab Test 01/11/24  1123 12/22/23  1209 09/21/23  1220 09/14/23  1252 04/10/23  2347 04/10/23  1327 04/10/23  1210   HGB  --  14.2 15.5 14.9   < > 12.1  --    PLT  --  169 141* 124*   < > 76* 86*   INR  --   --   --   --   --  1.71* 1.76*    133*  --  133*   < > 137  --    POTASSIUM 4.1 4.4  --  4.5   < > 3.4  --    CR 0.93 0.90 1.17 1.07   < > 1.13  --    A1C  --  5.1  --  5.3  --   --   --     < > = values in this interval not displayed.        Diagnostics:  Recent Results (from the past 720 hour(s))   Basic metabolic panel    Collection Time: 12/22/23 12:09 PM   Result Value Ref Range    Sodium 133 (L) 135 - 145 mmol/L    Potassium 4.4 3.4 - 5.3 mmol/L    Chloride 96 (L) 98 - 107 mmol/L    Carbon Dioxide (CO2) 24 22 - 29 mmol/L    Anion Gap 13 7 - 15 mmol/L    Urea Nitrogen 29.0 (H) 8.0 - 23.0 mg/dL    Creatinine 0.90 0.51 - 1.17 mg/dL    GFR Estimate 71 >60 mL/min/1.73m2    Calcium 9.2 8.8 - 10.2 mg/dL    Glucose 109 (H) 70 - 99 mg/dL   Hemoglobin A1c    Collection Time: 12/22/23 12:09 PM   Result Value Ref Range    Hemoglobin A1C 5.1 0.0 - 5.6 %   Lipid panel reflex to direct LDL Fasting    Collection Time: 12/22/23 12:09 PM   Result Value Ref Range    Cholesterol 148 <200 mg/dL    Triglycerides 90 <150 mg/dL    Direct Measure HDL 52 >=40 mg/dL    LDL Cholesterol Calculated 78 <=100 mg/dL    Non HDL Cholesterol  96 <130 mg/dL    Patient Fasting > 8hrs? Yes    Albumin Random Urine Quantitative with Creat Ratio    Collection Time: 12/22/23 12:09 PM   Result Value Ref Range    Creatinine Urine mg/dL 20.9 mg/dL    Albumin Urine mg/L <12.0 mg/L    Albumin Urine mg/g Cr     CBC with platelets and differential    Collection Time: 12/22/23 12:09 PM   Result Value Ref Range    WBC Count 6.0 4.0 - 11.0 10e3/uL    RBC Count 4.07 3.80 - 5.90 10e6/uL    Hemoglobin 14.2 11.7 - 17.7 g/dL    Hematocrit 41.6 35.0 - 53.0 %     (H) 78 - 100 fL    MCH 34.9 (H) 26.5 - 33.0 pg    MCHC 34.1 31.5 - 36.5 g/dL    RDW 11.4 10.0 - 15.0 %    Platelet Count 169 150 - 450 10e3/uL    % Neutrophils 86 %    % Lymphocytes 8 %    % Monocytes 5 %    % Eosinophils 1 %    % Basophils 1 %    % Immature Granulocytes 0 %    Absolute Neutrophils 5.2 1.6 - 8.3 10e3/uL    Absolute Lymphocytes 0.5 (L) 0.8 - 5.3 10e3/uL    Absolute Monocytes 0.3 0.0 - 1.3 10e3/uL    Absolute Eosinophils 0.0 0.0 - 0.7 10e3/uL    Absolute Basophils 0.0 0.0 - 0.2 10e3/uL    Absolute Immature Granulocytes 0.0 <=0.4 10e3/uL   Basic metabolic panel    Collection Time: 01/11/24 11:23 AM   Result Value Ref Range    Sodium 137 135 - 145 mmol/L    Potassium 4.1 3.4 - 5.3 mmol/L    Chloride 95 (L) 98 - 107 mmol/L    Carbon Dioxide (CO2) 30 (H) 22 - 29 mmol/L    Anion Gap 12 7 - 15 mmol/L    Urea Nitrogen 27.1 (H) 8.0 - 23.0 mg/dL    Creatinine 0.93 0.51 - 1.17 mg/dL    GFR Estimate 69 >60 mL/min/1.73m2    Calcium 10.0 8.8 - 10.2 mg/dL    Glucose 123 (H) 70 - 99 mg/dL   CBC with platelets and differential    Collection Time: 01/15/24  2:57 PM   Result Value Ref Range    WBC Count 5.4 4.0 - 11.0 10e3/uL    RBC Count 4.35 3.80 - 5.90 10e6/uL    Hemoglobin 15.2 11.7 - 17.7 g/dL    Hematocrit 45.4 35.0 - 53.0 %     (H) 78 - 100 fL    MCH 34.9 (H) 26.5 - 33.0 pg    MCHC 33.5 31.5 - 36.5 g/dL    RDW 11.9 10.0 - 15.0 %    Platelet Count 139 (L) 150 - 450 10e3/uL    % Neutrophils 79 %    %  Lymphocytes 10 %    % Monocytes 10 %    % Eosinophils 1 %    % Basophils 1 %    % Immature Granulocytes 0 %    Absolute Neutrophils 4.3 1.6 - 8.3 10e3/uL    Absolute Lymphocytes 0.5 (L) 0.8 - 5.3 10e3/uL    Absolute Monocytes 0.5 0.0 - 1.3 10e3/uL    Absolute Eosinophils 0.0 0.0 - 0.7 10e3/uL    Absolute Basophils 0.0 0.0 - 0.2 10e3/uL    Absolute Immature Granulocytes 0.0 <=0.4 10e3/uL      Cervical spine x-ray due to rheumatoid arthritis:  IMPRESSION: Exaggerated cervical lordosis. Advanced degenerative disc  and opposing endplate changes at all cervical levels. Otherwise  preserved vertebral body alignment. Preserved vertebral body height.  Advanced multilevel bilateral facet arthropathy. Osteopenic-appearing  bones. Stable prevertebral soft tissues. No acute finding in the  visualized extraspinal structures.       No EKG this visit, completed in the last 90 days. With cardiac device interrogation 11/8/23  Clearance by cardiology given 1/11/24    Revised Cardiac Risk Index (RCRI):  The patient has the following serious cardiovascular risks for perioperative complications:   - Congestive Heart Failure (pulmonary edema, PND, s3 antionette, CXR with pulmonary congestion, basilar rales) = 1 point     RCRI Interpretation: 1 point: Class II (low risk - 0.9% complication rate)  Cardiology notes 1/11/24  # Pre-operative Cardiac Clearance  Risk factors for perioperative MACE include compensated diastolic heart failure,atrial arrhythmias (AF/AFL), and immediate risk surgery. While her activity is somewhat limited due ortho issues, I believe that her functional status is satisfactory and that she is able to achieve > 4 METS, thus RCRI risk score is 1. This score corresponds with a 6% risk of periprocedural MACE.   - Overall, low risk for coronary events or arrhythmia. Of acceptable risk to proceed with ortho surgery.   - She is on eliquis for high burden of AF/AFL. Recommend bridging with lovenox 3 days prior to surgery. Resume  eliquis post-op.        Signed Electronically by: Gala Stringer PA-C  Copy of this evaluation report is provided to requesting physician.

## 2024-01-15 NOTE — H&P (VIEW-ONLY)
[facility]  [address] [facility] [address]  Phone: [phone]  Primary Provider: Alexander Prado  Pre-op Performing Provider: ALEXANDER PRADO      PREOPERATIVE EVALUATION:  Today's date: 1/15/2024    Amelia is a 63 year old, presenting for the following:  Pre-Op Exam        1/15/2024     1:52 PM   Additional Questions   Roomed by Coleen   Accompanied by Self       Surgical Information:  Surgery/Procedure: LEFT TOTAL HIP ARTHROPLASTY DIRECT ANTERIOR APPROACH ORTHOGRID   Surgery Location: [facility]  Surgeon: Dr. Romeo Quintana  Surgery Date: 1/24/2024  Time of Surgery: 11:20 am  Where patient plans to recover: At a TCU (Transitional Care Unit)  Fax number for surgical facility: Note does not need to be faxed, will be available electronically in Epic.    Assessment & Plan     The proposed surgical procedure is considered INTERMEDIATE risk.      ICD-10-CM    1. Preop general physical exam  Z01.818 CBC with platelets and differential     CBC with platelets and differential      2. Primary osteoarthritis of left hip  M16.12       3. Rheumatoid arthritis of both ankles, unspecified whether rheumatoid factor present (H)  M06.9 XR Cervical Spine 2/3 Views      4. Paroxysmal atrial fibrillation (H)  I48.0       5. Primary hypertension  I10       6. Chronic congestive heart failure, unspecified heart failure type (H)  I50.9       7. Embolism and thrombosis of unspecified artery (H)  I74.9       8. Atrial flutter, unspecified type (H)  I48.92       9. Stage 3b chronic kidney disease (H)  N18.32       10. Thrombocytopenia, unspecified (H24)  D69.6       11. Diffuse large B-cell lymphoma of extranodal site (H)  C83.39       12. S/P TVR (tricuspid valve replacement)  Z95.4       13. H/O cardiac arrest  Z86.74                Implanted Device:   - Type of device: tricuspid tissue valve Patient advised to bring device information on day of surgery.   - Type of device: pacemaker Patient advised  to bring device information on day of surgery.    Risks and Recommendations:  The patient has the following additional risks and recommendations for perioperative complications:   - Consult Hospitalist / IM to assist with post-op medical management   - Recurrent use of steroids  On daily prednisone 3 mg. Does not meet requirement for prophylactic steroid burst. But monitor closely in post op period for signs of adrenal insufficiency   Cardiovascular:   - Cardiology consulted - cleared by cardiology 1/11/24  Anemia/Bleeding/Clotting:    - Significant bleeding history   - History of DVT or PE, consider DVT prevention postoperatively   - thrombocytopenia: stable, with platelets 139  CKD: stable: creatinine 0.93  Review C spine x-ray due to RA: exaggerated cervical lordosis, advanced DDD      Antiplatelet or Anticoagulation Medication Instructions:   - aspirin: Discontinue aspirin 7-10 days prior to procedure to reduce bleeding risk. It should be resumed postoperatively.    - apixaban (Eliquis), edoxaban (Savaysa), rivaroxaban (Xarelto):   Hold as per cardiology. Bridge with lovenox.    Additional Medication Instructions:  Patient is to take all scheduled medications on the day of surgery EXCEPT for modifications listed below:   - Calcium Channel Blockers: May be continued on the day of surgery.   - Diuretics: May continue due to heart failure.   - SGLT2 Inhibitor (canagliflozin, dapagliflozin, or empagliflozin): HOLD 3 days before surgery.    - hydroxychloroquine: continue without modification   - bisphosphonates (alendronate, ibandronate, risedronate): not scheduled day of surgery, continue as planned   - pregabalin, gabapentin: Continue without modification.    RECOMMENDATION:  APPROVAL GIVEN to proceed with proposed procedure, without further diagnostic evaluation.    I spent a total of 32 minutes on the day of the visit.   Time spent by me doing chart review, history and exam, documentation and further activities  per the note     Subjective       HPI related to upcoming procedure:  chronic arthritis/hip pain, dislocated hip, with worsening pain.        1/15/2024     6:41 AM   Preop Questions   1. Have you ever had a heart attack or stroke? No   2. Have you ever had surgery on your heart or blood vessels, such as a stent placement, a coronary artery bypass, or surgery on an artery in your head, neck, heart, or legs? YES - see chart   3. Do you have chest pain with activity? No   4. Do you have a history of  heart failure? YES - follows with cardiology    5. Do you currently have a cold, bronchitis or symptoms of other infection? No   6. Do you have a cough, shortness of breath, or wheezing? No   7. Do you or anyone in your family have previous history of blood clots? YES - DVT from PICC line around 2017   8. Do you or does anyone in your family have a serious bleeding problem such as prolonged bleeding following surgeries or cuts? YES - thrombocytopenia and blood thinners   9. Have you ever had problems with anemia or been told to take iron pills? YES - due to lymphoma. Normal recently.   10. Have you had any abnormal blood loss such as black, tarry or bloody stools, or abnormal vaginal bleeding? No   11. Have you ever had a blood transfusion? YES - open heart surgery and with chemo 2017   11a. Have you ever had a transfusion reaction? YES - reacted to platelets with hives, is premedicated with tylenol and benadryl   12. Are you willing to have a blood transfusion if it is medically needed before, during, or after your surgery? Yes   13. Have you or any of your relatives ever had problems with anesthesia? YES - nausea from anesthesia, needs zofran   14. Do you have sleep apnea, excessive snoring or daytime drowsiness? No   15. Do you have any artifical heart valves or other implanted medical devices like a pacemaker, defibrillator, or continuous glucose monitor? YES - valve   15a. What type of device do you have? Tricuspid   st tobias tissue valve. Pacemaker   15b. Name of the clinic that manages your device:  [facility]   16. Do you have artificial joints? No   17. Are you allergic to latex? No       Health Care Directive:  Patient has a Health Care Directive on file      Preoperative Review of :   reviewed - controlled substances reflected in medication list.      Status of Chronic Conditions:  See problem list for active medical problems.  Problems all longstanding and stable, except as noted/documented.  See ROS for pertinent symptoms related to these conditions.      From cardiology visit 1/11/24  Assessment and Plan:   Ms. 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 She was recently admitted 11/19/-12/5/22 with ADHF and frequent NSVT, now s/p VT ablation. She presents to [facility] for hospital follow up.      Overall, appearing well. Weights are stable, euvolemic by exam. Review of today's labs demonstrate stable renal function and normal lytes. Functional class I-II symptoms. From a cardiac perspective she is of acceptable risk to proceed with ortho surgery. No further cardiac testing needed.  Recommend bridging with lovenox 3 days prior to surgery. Hold jardiance 3 days prior to surgery as well.      # Pre-operative Cardiac Clearance  Risk factors for perioperative MACE include compensated diastolic heart failure,atrial arrhythmias (AF/AFL), and immediate risk surgery. While her activity is somewhat limited due ortho issues, I believe that her functional status is satisfactory and that she is able to achieve > 4  METS, thus RCRI risk score is 1. This score corresponds with a 6% risk of periprocedural MACE.   - Overall, low risk for coronary events or arrhythmia. Of acceptable risk to proceed with ortho surgery.   - She is on eliquis for high burden of AF/AFL. Recommend bridging with lovenox 3 days prior to surgery. Resume eliquis post-op.      # Acute on chronic diastolic heart failure with dilated and mildly reduced RV function (LVEF 55-60%)  Pre-Ablation: 11/28/22 RHC: CVP 24, PA 48/27/34, PCWP 23, SVO2 46, CI 1.1, CO 1.6  Post Ablation and Diuresis: 12/3/22 RHC: CVP 16, PA 41/22/28, PCWP 15, SVO2 69, CI 2.1, CO 3.0     NYHA Class III, AHA/ACC Stage C  Rate control: 101, continue diltiazem   volume status: euvolemic, continue torsemide 20 mg daily    Blood pressure control:  Diltiazem 240 mg daily   Aldosterone antagonist: Aldactone 12.5 mg daily   SGLT2:  Empagliflozin 10 mg daily   Evaluation of coronary arteries: 11/28/22 angiogram mild non-obstructive CAD     # Frequent, non-sustained ventricular tachycardia s/p VT Ablation (by Dr. Eaton on 12/1/22)  - Anti-arrhythmic: none, s/p VT ablation  - Stopped PTA digoxin per EP recommendations  - Anticoagulation: given history of Afib, continue Apixaban 5 mg bid  - Follow up with EP specialists (Dr. Eaton and/or Maria Esther Cutler NP)     # HTN Intolerant to Metoprolol in the past.   - Diltiazem 240 mg daily, spironolactone 12.5 mg daily       # Aflutter. History of SSS s/p PPM 12/19. Long standing history of atrial arrythmias.  - Diltiazem 240 mg daily  - Continue Eliquis.   - Follow up with EP as scheduled.      # VSD s/p repair.      # History of exudative pericardial effusion.   - Stable per CT 3/19.     # Severe TR per TTE 11/2022 s/p TVR on 4/10/23  - continue ASA 81 mg daily     Review of Systems  CONSTITUTIONAL: NEGATIVE for fever, chills, change in weight  INTEGUMENTARY/SKIN: NEGATIVE for worrisome rashes, moles or lesions  EYES: NEGATIVE for vision changes or  irritation  ENT/MOUTH: NEGATIVE for ear, mouth and throat problems  RESP: NEGATIVE for significant cough or SOB  CV: NEGATIVE for chest pain, palpitations or peripheral edema  GI: NEGATIVE for nausea, abdominal pain, heartburn, or change in bowel habits  : NEGATIVE for frequency, dysuria, or hematuria  MUSCULOSKELETAL:POSITIVE  for left hip pain  NEURO: NEGATIVE for weakness, dizziness or paresthesias  ENDOCRINE: NEGATIVE for temperature intolerance, skin/hair changes  HEME: NEGATIVE for bleeding problems  PSYCHIATRIC: NEGATIVE for changes in mood or affect    Patient Active Problem List    Diagnosis Date Noted    Falls frequently 12/23/2023     Priority: Medium    Prediabetes 12/23/2023     Priority: Medium    S/P TVR (tricuspid valve replacement) 04/27/2023     Priority: Medium    Hypervolemia, unspecified hypervolemia type 01/24/2023     Priority: Medium    Edema, unspecified type 01/24/2023     Priority: Medium    Heart failure with preserved ejection fraction, NYHA class I (H) 01/24/2023     Priority: Medium    Severe tricuspid regurgitation 12/19/2022     Priority: Medium    Hyponatremia 11/19/2022     Priority: Medium    Acute on chronic diastolic heart failure (H) 11/19/2022     Priority: Medium    Iatrogenic cushingoid features (H24) 06/09/2022     Priority: Medium    Steroid-induced osteoporosis 06/09/2022     Priority: Medium    Chronic kidney disease, stage 3 10/16/2021     Priority: Medium    Heart palpitations 12/12/2019     Priority: Medium    Bradycardia 12/12/2019     Priority: Medium     Added automatically from request for surgery [id number]      Mild protein-calorie malnutrition (H24) 10/29/2019     Priority: Medium    Embolism and thrombosis of unspecified artery (H) 10/29/2019     Priority: Medium    Thrombocytopenia, unspecified (H24) 10/29/2019     Priority: Medium    Cervical cancer screening 10/18/2018     Priority: Medium     2011, 2015 NIL paps  10/18/2018:NIL pap, Neg HPV Pap screening  intervals discussed with oncologist, every 3 years should be fine. Magda Stringer PA-C   10/15/21 NIL pap, Neg HPV-every 3 year cotest due to immunopsupression      Atrial flutter, unspecified type (H) 05/17/2018     Priority: Medium    Paroxysmal atrial fibrillation (H) 05/11/2018     Priority: Medium    CHF (congestive heart failure) (H) 03/15/2018     Priority: Medium    H/O cardiac arrest 09/26/2017     Priority: Medium    DLBCL (diffuse large B cell lymphoma) (H) 09/07/2017     Priority: Medium    Physical deconditioning 08/12/2017     Priority: Medium    Febrile neutropenia  (H24) 08/08/2017     Priority: Medium    Diffuse large B cell lymphoma (H) 08/04/2017     Priority: Medium    Diffuse large B-cell lymphoma of extranodal site (H) 07/27/2017     Priority: Medium    Critical illness myopathy 06/16/2017     Priority: Medium    Cardiogenic shock (H) 05/29/2017     Priority: Medium    Atrial tachycardia 05/16/2017     Priority: Medium    Lymphedema of both lower extremities 04/04/2017     Priority: Medium    RA (rheumatoid arthritis) (H) 05/02/2016     Priority: Medium    Hyperlipidemia LDL goal <130 02/22/2011     Priority: Medium    HTN (hypertension)      Priority: Medium    Primary pulmonary hypertension (H)      Priority: Medium     Seeing [facility].         Past Medical History:   Diagnosis Date    Arthritis     Cancer (H) June 2017    DLBCL currently in remission    Cardiac arrest (H)     History of blood clots 2017    PICC line Right arm     History of chemotherapy     History of transfusion     Hypertension     Neuropathy     Physical deconditioning     PONV (postoperative nausea and vomiting)     Positive PPD, 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    VSD (ventricular septal defect)      Past Surgical History:   Procedure Laterality Date    ANESTHESIA CARDIOVERSION N/A 5/17/2017    Procedure: ANESTHESIA CARDIOVERSION;  ANESTHESIA CARDIOVERSION;  Surgeon: GENERIC ANESTHESIA PROVIDER;  Location: [facility] OR    ANESTHESIA  CARDIOVERSION  3/12/2018    Procedure: ANESTHESIA CARDIOVERSION;;  Surgeon: GENERIC ANESTHESIA PROVIDER;  Location: [facility] OR    ANESTHESIA CARDIOVERSION N/A 3/23/2018    Procedure: ANESTHESIA CARDIOVERSION;  Anesthesia Cardioversion ;  Surgeon: GENERIC ANESTHESIA PROVIDER;  Location: [facility] OR    ANESTHESIA CARDIOVERSION N/A 4/12/2018    Procedure: ANESTHESIA CARDIOVERSION;  Cardioversion;  Surgeon: GENERIC ANESTHESIA PROVIDER;  Location: [facility] OR    ARTHRODESIS WRIST  2000    Right wrist    BONE MARROW ASPIRATE &BIOPSY  7/12/2017         BONE MARROW BIOPSY W/ ASPIRATION  07/12/2017    CARDIOVERSION      5/17/17, 3/12/18, 3/23/18, 4/14/18,     COLONOSCOPY N/A 11/19/2019    Procedure: Colonoscopy, With Polypectomy And Biopsy;  Surgeon: Pj Vasques MD;  [facility]    CV CORONARY ANGIOGRAM N/A 11/28/2022    Procedure: Coronary Angiogram;  Surgeon: Sundar Wood MD;  Location:  HE[facility] CARDIAC CATH LAB    CV RIGHT HEART CATH MEASUREMENTS RECORDED N/A 11/28/2022    Procedure: Right Heart Catheterization;  Surgeon: Sundar Wood MD;  Location: [facility] CARDIAC CATH LAB    EP ABLATION PVC N/A 12/1/2022    Procedure: Ablation Premature Ventricular Contractions;  Surgeon: Juan Eaton MD;  Location: [facility] CARDIAC CATH LAB    EP PACEMAKER N/A 12/13/2019    Procedure: EP Pacemaker;  Surgeon: Pj Trent MD;  Location: [facility] CARDIAC CATH LAB    EXCISE LESION UPPER EXTREMITY Left 5/22/2018    Procedure: EXCISE LESION UPPER EXTREMITY;  Left Arm Wound Excision And Closure, Possible Submuscular Transposition of Ulnar Nerve  (Choice Anesthesia);  Surgeon: Kevin Sheldon MD;  Location:  OR    FOOT SURGERY      4 left and 2 right    FOOT SURGERY      4 Left and 2 Right     IMPLANT PACEMAKER  12/13/2019    Dr China Trent  [facility] Cardiac Cath lab     IMPLANT PACEMAKER      RELEASE CARPAL TUNNEL Bilateral     RELEASE CARPAL TUNNEL BILATERAL      REPAIR VALVE TRICUSPID MINIMALLY INVASIVE N/A  4/10/2023    Procedure: MINIMALLY INVASIVE TRICUSPID VALVE REPLACEMENT USING 29MM [employer] EPIC VALVE, FEMORAL CANNULATION AND RIGHT GROIN CUTDOWN, CARDIOPULMONARY BYPASS, TRANSESOPHAGEAL ECHOCARDIOGRAM PERFORMED BY ANESTHESIA STAFF;  Surgeon: Chilango Cope MD;  Location: [facility] OR    REPAIR VENTRICULAR SEPTAL DEFECT  1969    TRANSPOSITION ULNAR NERVE (ELBOW) Left 5/22/2018    Procedure: TRANSPOSITION ULNAR NERVE (ELBOW);;  Surgeon: Kevin Sheldon MD;  Location: [facility] OR    ULNAR NERVE TRANSPOSITION Left 05/22/2018    VSD REPAIR  1969    ZZC FUSION FOOT BONES,SUBTALAR Right 10/20/2020    Procedure: RIGHT SUBTALAR JOINT FUSION AND CALCANEOCUBOID FUSION;  Surgeon: Nemesio Crow MD;  Location: [facility] Main OR;  Service: Orthopedics     Current Outpatient Medications   Medication Sig Dispense Refill    alendronate (FOSAMAX) 70 MG tablet Take 1 tablet (70 mg) by mouth every 7 days (Patient taking differently: Take 70 mg by mouth every 7 days Mondays) 12 tablet 3    apixaban ANTICOAGULANT (ELIQUIS ANTICOAGULANT) 5 MG tablet Take 1 tablet (5 mg) by mouth 2 times daily 180 tablet 3    aspirin (ASA) 81 MG chewable tablet 1 tablet (81 mg) by Oral or NG Tube route daily 30 tablet 2    calcium carbonate 600 mg-vitamin D 400 units (CALTRATE) 600-400 MG-UNIT per tablet Take 2 tablets by mouth daily      diltiazem ER (DILT-XR) 240 MG 24 hr ER beaded capsule Take 1 capsule (240 mg) by mouth daily 90 capsule 3    docusate sodium (COLACE) 100 MG capsule Take 100 mg by mouth 2 times daily as needed for constipation      empagliflozin (JARDIANCE) 10 MG TABS tablet Take 1 tablet (10 mg) by mouth daily 90 tablet 3    [START ON 1/21/2024] enoxaparin ANTICOAGULANT (LOVENOX) 40 MG/0.4ML syringe Inject 0.4 mLs (40 mg) Subcutaneous 2 times daily 2.4 mL 0    gabapentin (NEURONTIN) 100 MG capsule TAKE ONE CAPSULE BY MOUTH EVERY MORNING, 1 CAPSULE IN THE AFTERNOON, AND THEN TAKE 2 CAPSULES EVERY NIGHT AT BEDTIME 360 capsule 3     hydroxychloroquine (PLAQUENIL) 200 MG tablet Take 1 tablet (200 mg) by mouth daily 90 tablet 1    loratadine (CLARITIN) 10 MG tablet Take 10 mg by mouth daily      magnesium oxide 200 MG TABS Take 200 mg by mouth daily bedtime      multivitamin, therapeutic with minerals (THERA-VIT-M) TABS tablet Take 1 tablet by mouth daily 30 each 0    potassium chloride ER (KLOR-CON M) 20 MEQ CR tablet Take 3 tablets (60 mEq) by mouth daily 270 tablet 3    predniSONE (DELTASONE) 1 MG tablet Take 3 tablets (3 mg) by mouth daily - Oral 270 tablet 1    spironolactone (ALDACTONE) 25 MG tablet Take 0.5 tablets (12.5 mg) by mouth daily 90 tablet 1    torsemide (DEMADEX) 20 MG tablet Take 1 tablet (20 mg) by mouth daily 90 tablet 3    UNABLE TO FIND MEDICATION NAME: Relief CBD soft gels      UNABLE TO FIND MEDICATION NAME: CBD cream      Vitamin D (Cholecalciferol) 10 MCG (400 UNIT) TABS Take 1 tablet by mouth daily      acetaminophen (TYLENOL) 325 MG tablet Take 2 tablets (650 mg) by mouth every 4 hours as needed for other (For optimal non-opioid multimodal pain management to improve pain control.) (Patient not taking: Reported on 12/22/2023) 100 tablet 1    dexamethasone (DECADRON) 4 MG/ML injection Inject 4 mg IM for adrenal crisis (Patient not taking: Reported on 12/22/2023) 2 mL 3    metolazone (ZAROXOLYN) 2.5 MG tablet Take 1 tablet (2.5 mg) by mouth as needed (ONLY take if instructed to do so by your cardiology team.) (Patient not taking: Reported on 12/22/2023) 5 tablet 0    predniSONE (DELTASONE) 5 MG tablet 10 mg when sick for 3 days or until back to baseline for secondary adrenal insufficiency (Patient not taking: Reported on 12/22/2023) 30 tablet 3    sulfamethoxazole-trimethoprim (BACTRIM DS) 800-160 MG tablet Take 1 tablet by mouth 2 times daily (Patient not taking: Reported on 12/22/2023) 4 tablet 5    zoledronic Acid (RECLAST) 5 MG/100ML SOLN infusion  (Patient not taking: Reported on 1/11/2024)         Allergies  "  Allergen Reactions    Amiodarone Other (See Comments) and Difficulty breathing     Lethargic and had trouble breathing- occurred in 2017    Blood Transfusion Related (Informational Only) Hives     Hives with platelets. Give benadryl premedication.    Metoprolol Other (See Comments)     Pt and  report that metoprolol does not work for her and also reports feeling unwell with this medication. She has been able to tolerate atenolol, which as worked in controlling her HR.     Other [No Clinical Screening - See Comments]      Penicillin Allergy Skin Test not performed, see antimicrobial management team progress note 7/5/17.      Penicillins      Childhood reaction, concern for tongue swelling    Tape [Adhesive Tape] Rash        Social History     Tobacco Use    Smoking status: Never    Smokeless tobacco: Never   Substance Use Topics    Alcohol use: Yes     Comment: none     Family History   Problem Relation Age of Onset    Breast Cancer Mother         60s    Hypertension Mother     Alzheimer Disease Mother     Cerebrovascular Disease Mother     Hypertension Father     Cerebrovascular Disease Father     Atrial fibrillation Father     Lymphoma Father     Prostate Cancer Father     Other Cancer Father         some form of Lymphoma    Alzheimer Disease Maternal Grandmother         likely    Arthritis Maternal Grandfather     Pneumonia Maternal Grandfather     Diabetes Paternal Grandmother     Mental Illness Sister         early onset  alzheimers     History   Drug Use Unknown         Objective     /79   Pulse 81   Temp 97.8  F (36.6  C) (Tympanic)   Resp 17   Ht 1.448 m (4' 9.01\")   Wt 48.7 kg (107 lb 6.4 oz)   SpO2 98%   BMI 23.23 kg/m      Physical Exam    GENERAL APPEARANCE: healthy, alert and no distress     EYES: EOMI, PERRL     HENT: ear canals and TM's normal and nose and mouth without ulcers or lesions     NECK: no adenopathy, no asymmetry, masses, or scars and thyroid normal to palpation     " RESP: lungs clear to auscultation - no rales, rhonchi or wheezes     CV: regular rates and rhythm, normal S1 S2, no S3 or S4 and no murmur, click or rub     ABDOMEN:  soft, nontender, no HSM or masses and bowel sounds normal     MS: extremities normal- no gross deformities noted, no evidence of inflammation in joints, FROM in all extremities.     SKIN: no suspicious lesions or rashes     NEURO: Normal strength and tone, sensory exam grossly normal, mentation intact and speech normal     PSYCH: mentation appears normal. and affect normal/bright     LYMPHATICS: No cervical adenopathy    Recent Labs   Lab Test 01/11/24  1123 12/22/23  1209 09/21/23  1220 09/14/23  1252 04/10/23  2347 04/10/23  1327 04/10/23  1210   HGB  --  14.2 15.5 14.9   < > 12.1  --    PLT  --  169 141* 124*   < > 76* 86*   INR  --   --   --   --   --  1.71* 1.76*    133*  --  133*   < > 137  --    POTASSIUM 4.1 4.4  --  4.5   < > 3.4  --    CR 0.93 0.90 1.17 1.07   < > 1.13  --    A1C  --  5.1  --  5.3  --   --   --     < > = values in this interval not displayed.        Diagnostics:  Recent Results (from the past 720 hour(s))   Basic metabolic panel    Collection Time: 12/22/23 12:09 PM   Result Value Ref Range    Sodium 133 (L) 135 - 145 mmol/L    Potassium 4.4 3.4 - 5.3 mmol/L    Chloride 96 (L) 98 - 107 mmol/L    Carbon Dioxide (CO2) 24 22 - 29 mmol/L    Anion Gap 13 7 - 15 mmol/L    Urea Nitrogen 29.0 (H) 8.0 - 23.0 mg/dL    Creatinine 0.90 0.51 - 1.17 mg/dL    GFR Estimate 71 >60 mL/min/1.73m2    Calcium 9.2 8.8 - 10.2 mg/dL    Glucose 109 (H) 70 - 99 mg/dL   Hemoglobin A1c    Collection Time: 12/22/23 12:09 PM   Result Value Ref Range    Hemoglobin A1C 5.1 0.0 - 5.6 %   Lipid panel reflex to direct LDL Fasting    Collection Time: 12/22/23 12:09 PM   Result Value Ref Range    Cholesterol 148 <200 mg/dL    Triglycerides 90 <150 mg/dL    Direct Measure HDL 52 >=40 mg/dL    LDL Cholesterol Calculated 78 <=100 mg/dL    Non HDL Cholesterol  96 <130 mg/dL    Patient Fasting > 8hrs? Yes    Albumin Random Urine Quantitative with Creat Ratio    Collection Time: 12/22/23 12:09 PM   Result Value Ref Range    Creatinine Urine mg/dL 20.9 mg/dL    Albumin Urine mg/L <12.0 mg/L    Albumin Urine mg/g Cr     CBC with platelets and differential    Collection Time: 12/22/23 12:09 PM   Result Value Ref Range    WBC Count 6.0 4.0 - 11.0 10e3/uL    RBC Count 4.07 3.80 - 5.90 10e6/uL    Hemoglobin 14.2 11.7 - 17.7 g/dL    Hematocrit 41.6 35.0 - 53.0 %     (H) 78 - 100 fL    MCH 34.9 (H) 26.5 - 33.0 pg    MCHC 34.1 31.5 - 36.5 g/dL    RDW 11.4 10.0 - 15.0 %    Platelet Count 169 150 - 450 10e3/uL    % Neutrophils 86 %    % Lymphocytes 8 %    % Monocytes 5 %    % Eosinophils 1 %    % Basophils 1 %    % Immature Granulocytes 0 %    Absolute Neutrophils 5.2 1.6 - 8.3 10e3/uL    Absolute Lymphocytes 0.5 (L) 0.8 - 5.3 10e3/uL    Absolute Monocytes 0.3 0.0 - 1.3 10e3/uL    Absolute Eosinophils 0.0 0.0 - 0.7 10e3/uL    Absolute Basophils 0.0 0.0 - 0.2 10e3/uL    Absolute Immature Granulocytes 0.0 <=0.4 10e3/uL   Basic metabolic panel    Collection Time: 01/11/24 11:23 AM   Result Value Ref Range    Sodium 137 135 - 145 mmol/L    Potassium 4.1 3.4 - 5.3 mmol/L    Chloride 95 (L) 98 - 107 mmol/L    Carbon Dioxide (CO2) 30 (H) 22 - 29 mmol/L    Anion Gap 12 7 - 15 mmol/L    Urea Nitrogen 27.1 (H) 8.0 - 23.0 mg/dL    Creatinine 0.93 0.51 - 1.17 mg/dL    GFR Estimate 69 >60 mL/min/1.73m2    Calcium 10.0 8.8 - 10.2 mg/dL    Glucose 123 (H) 70 - 99 mg/dL   CBC with platelets and differential    Collection Time: 01/15/24  2:57 PM   Result Value Ref Range    WBC Count 5.4 4.0 - 11.0 10e3/uL    RBC Count 4.35 3.80 - 5.90 10e6/uL    Hemoglobin 15.2 11.7 - 17.7 g/dL    Hematocrit 45.4 35.0 - 53.0 %     (H) 78 - 100 fL    MCH 34.9 (H) 26.5 - 33.0 pg    MCHC 33.5 31.5 - 36.5 g/dL    RDW 11.9 10.0 - 15.0 %    Platelet Count 139 (L) 150 - 450 10e3/uL    % Neutrophils 79 %    %  Lymphocytes 10 %    % Monocytes 10 %    % Eosinophils 1 %    % Basophils 1 %    % Immature Granulocytes 0 %    Absolute Neutrophils 4.3 1.6 - 8.3 10e3/uL    Absolute Lymphocytes 0.5 (L) 0.8 - 5.3 10e3/uL    Absolute Monocytes 0.5 0.0 - 1.3 10e3/uL    Absolute Eosinophils 0.0 0.0 - 0.7 10e3/uL    Absolute Basophils 0.0 0.0 - 0.2 10e3/uL    Absolute Immature Granulocytes 0.0 <=0.4 10e3/uL      Cervical spine x-ray due to rheumatoid arthritis:  IMPRESSION: Exaggerated cervical lordosis. Advanced degenerative disc  and opposing endplate changes at all cervical levels. Otherwise  preserved vertebral body alignment. Preserved vertebral body height.  Advanced multilevel bilateral facet arthropathy. Osteopenic-appearing  bones. Stable prevertebral soft tissues. No acute finding in the  visualized extraspinal structures.       No EKG this visit, completed in the last 90 days. With cardiac device interrogation 11/8/23  Clearance by cardiology given 1/11/24    Revised Cardiac Risk Index (RCRI):  The patient has the following serious cardiovascular risks for perioperative complications:   - Congestive Heart Failure (pulmonary edema, PND, s3 antionette, CXR with pulmonary congestion, basilar rales) = 1 point     RCRI Interpretation: 1 point: Class II (low risk - 0.9% complication rate)  Cardiology notes 1/11/24  # Pre-operative Cardiac Clearance  Risk factors for perioperative MACE include compensated diastolic heart failure,atrial arrhythmias (AF/AFL), and immediate risk surgery. While her activity is somewhat limited due ortho issues, I believe that her functional status is satisfactory and that she is able to achieve > 4 METS, thus RCRI risk score is 1. This score corresponds with a 6% risk of periprocedural MACE.   - Overall, low risk for coronary events or arrhythmia. Of acceptable risk to proceed with ortho surgery.   - She is on eliquis for high burden of AF/AFL. Recommend bridging with lovenox 3 days prior to surgery. Resume  eliquis post-op.        Signed Electronically by: Gala Stringer PA-C  Copy of this evaluation report is provided to requesting physician.

## 2024-01-16 NOTE — PROVIDER NOTIFICATION
Scheduled for left total hip arthroplasty 1/24/23 with Dr. Quintana at [facility].    Patient reports that she fell and got a cut on her head on 12/6/23. She still has a scab on the crown of her head that is nickel sized.  Informed Dr. Quintana's team of this skin issue before surgery.    Mavis Lockhart RN

## 2024-01-16 NOTE — PROGRESS NOTES
Planning to discharge home on POD 1 in the morning with her  helping her.       01/16/24 1206   Discharge Planning   Patient/Family Anticipates Transition to home with family   Concerns to be Addressed all concerns addressed in this encounter   Living Arrangements   People in Home spouse   Type of Residence Private Residence   Is your private residence a single family home or apartment? Single family home   Number of Stairs, Within Home, Primary none  (2 exterior steps)   Stair Railings, Within Home, Primary railings safe and in good condition   Once home, are you able to live on one level? Yes   Which level? Main Level   Bathroom Shower/Tub Walk-in shower   Equipment Currently Used at Home shower chair;grab bar, tub/shower;raised toilet seat;walker, rolling;walker, standard;dressing device   Support System   Support Systems Spouse/Significant Other  (, Wolf)   Do you have someone available to stay with you one or two nights once you are home? Yes   Education   Patient attended total joint pre-op class/received pre-op teaching  email/phone call

## 2024-01-23 NOTE — TREATMENT PLAN
Orthopedic Surgery Pre-Op Plan: Amelia Michel  pre-op review. This is NOT an H&P   Surgeon: Dr. Quintana   [facility]: [facility]  Name of Surgery: Left Total Hip Arthroplasty- Direct Anterior Approach, Orthogrid  Date of Surgery: 1/24/24  H&P: Completed on 1/15/24 by Gala Stringer PA-C at [facility].   History of ASA, NSAIDS, vitamin and/or herbal supplements, GLP-1 Agonist medication taken within 10 days?: Yes- on ASA 81 mg- last taken on 1/20/24, then held for surgery.  Multivitamin-patient instructed to hold this for 7 days before surgery.  History of blood thinners?: Yes-on chronic anticoagulation with apixaban (Eliquis) for atrial fibrillation/atrial flutter.  Patient was instructed by to take last dose of Eliquis on 1/20/2024, then hold it for surgery.  Cardiology recommended that patient bridge with Lovenox 40 mg twice daily starting on 1/21/2024.  Patient was instructed to take last dose of Lovenox on morning of 1/23/2024, then to hold Lovenox for 24 hours before surgery    Plan:   1) Discharge Plan: Home morning of POD 1 with assist of , Wolf. Please see Discharge Planning section near bottom of this note for further details.     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 Stable per CT 3/2019.     3) Coronary Artery Disease: Non-Obstructive: per coronary angiogram 11/28/22.  Denies any chest pain/chest discomfort, MONTALVO, dizziness, or syncope.  Able to achieve >4 METS.  Per cardiology visit notes from 1/11/2024: Overall, low risk for coronary events or arrhythmia.  Of  acceptable risk to proceed with Ortho surgery.     4) Heart Failure with Preserved Ejection Fraction (HFpEF): Stable.  Euvolemic at recent cardiology visit on 1/11/2024.  Weights have been stable around 106-108 lbs.  On torsemide and spironolactone.  I recommend close monitoring of periodic fluid status.  Will  check daily weights.  Nursing to please notify hospitalist if there are any signs/symptoms of worsening heart failure or of weight gain >3 lbs in 24 hours.    5) Atrial Fibrillation and Atrial Flutter: Rate controlled on diltiazem and on chronic anticoagulation with Eliquis for stroke prevention.  Patient instructed to hold Eliquis for 3 full days before surgery (take last dose on 1/20/2024, then hold).  Cardiology did recommend bridging with Lovenox 40 mg subcu every 12 hours before surgery starting on 1/21/2024.  Patient was instructed to hold Lovenox for 24 hours before surgery (take last dose on morning of 1/23/2024, then hold).  After surgery, we will plan to resume Eliquis as soon as safe from a bleeding standpoint per Dr. Quintana.     6) Frequent, Non-Sustained Ventricular Tachycardia: S/P VT Ablation 12/1/22: No longer on digoxin.  Stopped digoxin per recommendations from electrophysiology.  Will continue to follow-up with electrophysiology.     7) History of Sick Sinus Syndrome: S/P Pacemaker placement (Medtronic) in 2019: Most recent remote device check on 11/8/2023 showed normal device functioning, stable leads, and an estimated 10 years remaining battery.     8) Severe Tricuspid Valve Regurgitation: S/P Tricuspid Valve Replacement 4/2023 ([facility]): Follow-up echo on 5/24/2023 showed tricuspid valve functioning.    9) S/P Ventricular Septal Defect (VSD) Repair:  in 1969.     10) Hypertension: Appears well-controlled on diltiazem, torsemide, and spironolactone.     11) Prediabetes: Most recent hemoglobin A1c 5.1 on 12/22/2023 which is down from hemoglobin A1c of 5.9 on 3/17/2023.  Blood glucose 123 on 1/11/2024.  On empagliflozin (Jardiance).  Patient was instructed to hold Jardiance for 3 days before surgery.  We will monitor BG's closely during hospital stay.  Goal BG <180 to decrease risk for infection and wound healing complications.    12) Thrombocytopenia: Mild: Chronic: Platelet count 139,000  on 1/15/2024.  Appears platelet counts have typically been in 110,000-197,000 range.  Should not affect proceeding with surgery or ability to have spinal/neuraxial anesthesia as long as platelet count remains >110,000.     13) History of DVT:  DVT in arm with PICC line in 2017.  On chronic anticoagulation with Eliquis for atrial fibrillation.     14) Rheumatoid Arthritis: on prednisone 3 mg daily and hydroxychloroquine.  Since daily prednisone use is <5 mg it was felt patient probably would not require stress dose steroids, however PCP recommended close postop monitoring.     15) Chronic Kidney Disease: Stage 3b: Stable.  Creatinine 0.93, GFR 69, BUN 27.1 on 1/11/2024. I recommend avoiding nephrotoxins like NSAIDS, promoting good post-op hydration and monitoring post-op kidney function closely.      16) History of Diffuse Large B-cell Lymphoma: Follows with [facility]. Reviewed most recent Oncology visit note with ELIZABETH Hsu, CNP, from 9/14/23: Completed chemotherapy 12/20/2017.  No evidence of relapse by history or physical exam.  Continues to be in complete remission.  Will follow-up again with Oncology in 1 year with repeat labs at that time.    17) Neuropathy: On gabapentin.    Patient has complex history described but was cleared by both PCP and Cardiology and appears medically optimized for upcoming surgery. I would recommend Hospitalist Consult to assist with medical management. Please call me below with any questions on this patient.       Review of Systems Notable for: History of cardiac arrest in 2017, coronary artery disease-non-obstructive, heart failure with preserved ejection fraction, atrial fibrillation and atrial flutter-on chronic anticoagulation with Eliquis, NSVT-s/p VT ablation 12/1/2022, history of sick sinus syndrome-s/p  Medtronic pacemaker placement in 2019, severe tricuspid valve regurgitation-s/p tricuspid valve replacement 4/2023, s/p ventricular septal  defect repair-in 1969, hypertension, prediabetes, thrombocytopenia-mild-chronic, history of DVT-in arm with PICC line in 2017, rheumatoid arthritis, chronic kidney disease-stage 3b, history of diffuse large B-cell lymphoma-in complete remission, neuropathy.    Past Medical History:   Past Medical History:   Diagnosis Date    Antiplatelet or antithrombotic long-term use     Arrhythmia     Arthritis     Atrial fibrillation (H)     Atrial flutter (H)     Cancer (H) June 2017    DLBCL currently in remission    Cardiac arrest (H) 2017    Chronic kidney disease     Congestive heart failure (H)     Diffuse large B-cell lymphoma of extranodal site (H) 2017    Extremity restricted from procedure     Left arm    Fall 12/06/2023    Cut on head    H/O blood transfusion reaction     Hives with platelet transfusion, needs pre-medication with tylenol and benadryl    Heart murmur     History of blood clots 2017    PICC line Right arm     History of chemotherapy     History of transfusion     Hypertension     Lymphedema of both lower extremities     wears lymphedema socks    Neuropathy     Feet and hands    NSVT (nonsustained ventricular tachycardia) (H) 12/2022    Pacemaker 2019    Pericardial effusion 2017    Physical deconditioning     PONV (postoperative nausea and vomiting)     Positive PPD, 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    Prediabetes     Pulmonary hypertension (H)     RA (rheumatoid arthritis) (H)     SSS (sick sinus syndrome) (H) 2019    Pacemaker    Steroid-induced osteoporosis     Thrombocytopenia (H24)     VSD (ventricular septal defect)      Past Surgical History:   Procedure Laterality Date    ANESTHESIA CARDIOVERSION N/A 05/17/2017    Procedure: ANESTHESIA CARDIOVERSION;  ANESTHESIA CARDIOVERSION;  Surgeon: GENERIC ANESTHESIA PROVIDER;  Location: [facility] OR    ANESTHESIA CARDIOVERSION  03/12/2018    Procedure: ANESTHESIA CARDIOVERSION;;  Surgeon: GENERIC ANESTHESIA PROVIDER;  Location: [facility] OR    ANESTHESIA CARDIOVERSION N/A 03/23/2018     Procedure: ANESTHESIA CARDIOVERSION;  Anesthesia Cardioversion ;  Surgeon: GENERIC ANESTHESIA PROVIDER;  Location:  OR    ANESTHESIA CARDIOVERSION N/A 04/12/2018    Procedure: ANESTHESIA CARDIOVERSION;  Cardioversion;  Surgeon: GENERIC ANESTHESIA PROVIDER;  Location:  OR    ARTHRODESIS WRIST  2000    Right wrist    BONE MARROW ASPIRATE &BIOPSY  07/12/2017         BONE MARROW BIOPSY W/ ASPIRATION  07/12/2017    CARDIOVERSION      5/17/17, 3/12/18, 3/23/18, 4/14/18,     COLONOSCOPY N/A 11/19/2019    Procedure: Colonoscopy, With Polypectomy And Biopsy;  Surgeon: Pj Vasques MD;  Location: [facility]    CV CORONARY ANGIOGRAM N/A 11/28/2022    Procedure: Coronary Angiogram;  Surgeon: Sundar Wood MD;  Location:  HEART CARDIAC CATH LAB    CV RIGHT HEART CATH MEASUREMENTS RECORDED N/A 11/28/2022    Procedure: Right Heart Catheterization;  Surgeon: Sundar Wood MD;  Location:  HEART CARDIAC CATH LAB    EP ABLATION PVC N/A 12/01/2022    Procedure: Ablation Premature Ventricular Contractions;  Surgeon: Juan Eaton MD;  Location:  HEART CARDIAC CATH LAB    EP PACEMAKER N/A 12/13/2019    Procedure: EP Pacemaker;  Surgeon: Pj Trent MD;  Location: [facility] CARDIAC CATH LAB    EXCISE LESION UPPER EXTREMITY Left 05/22/2018    Procedure: EXCISE LESION UPPER EXTREMITY;  Left Arm Wound Excision And Closure, Possible Submuscular Transposition of Ulnar Nerve  (Choice Anesthesia);  Surgeon: Kevin Sheldon MD;  Location:  OR    FOOT SURGERY      4 left and 2 right    FOOT SURGERY      4 Left and 2 Right     IMPLANT PACEMAKER  12/13/2019    Dr China Trent  [facility] Cardiac Cath lab     IMPLANT PACEMAKER      RELEASE CARPAL TUNNEL Bilateral     REPAIR VALVE TRICUSPID MINIMALLY INVASIVE N/A 04/10/2023    Procedure: MINIMALLY INVASIVE TRICUSPID VALVE REPLACEMENT USING 29MM [facility] EPIC VALVE, FEMORAL CANNULATION AND RIGHT GROIN CUTDOWN, CARDIOPULMONARY BYPASS, TRANSESOPHAGEAL ECHOCARDIOGRAM  PERFORMED BY ANESTHESIA STAFF;  Surgeon: Chilango Cope MD;  Location: [facility] OR    REPAIR VENTRICULAR SEPTAL DEFECT  1969    TRANSPOSITION ULNAR NERVE (ELBOW) Left 05/22/2018    Procedure: TRANSPOSITION ULNAR NERVE (ELBOW);;  Surgeon: Kevin Sheldon MD;  Location: [facility] OR    VSD REPAIR  1969    ZZC FUSION FOOT BONES,SUBTALAR Right 10/20/2020    Procedure: RIGHT SUBTALAR JOINT FUSION AND CALCANEOCUBOID FUSION;  Surgeon: Nemesio Crow MD;  Location: [facility] OR;  Service: Orthopedics       Current Medications:  Patient's Medications   New Prescriptions    No medications on file   Previous Medications    ACETAMINOPHEN (TYLENOL) 325 MG TABLET    Take 2 tablets (650 mg) by mouth every 4 hours as needed for other (For optimal non-opioid multimodal pain management to improve pain control.)    ALENDRONATE (FOSAMAX) 70 MG TABLET    Take 1 tablet (70 mg) by mouth every 7 days    APIXABAN ANTICOAGULANT (ELIQUIS ANTICOAGULANT) 5 MG TABLET    Take 1 tablet (5 mg) by mouth 2 times daily    ASPIRIN (ASA) 81 MG CHEWABLE TABLET    1 tablet (81 mg) by Oral or NG Tube route daily    CALCIUM CARBONATE 600 MG-VITAMIN D 400 UNITS (CALTRATE) 600-400 MG-UNIT PER TABLET    Take 2 tablets by mouth daily    DEXAMETHASONE (DECADRON) 4 MG/ML INJECTION    Inject 4 mg IM for adrenal crisis    DILTIAZEM ER (DILT-XR) 240 MG 24 HR ER BEADED CAPSULE    Take 1 capsule (240 mg) by mouth daily    DOCUSATE SODIUM (COLACE) 100 MG CAPSULE    Take 100 mg by mouth 2 times daily as needed for constipation    EMPAGLIFLOZIN (JARDIANCE) 10 MG TABS TABLET    Take 1 tablet (10 mg) by mouth daily    ENOXAPARIN ANTICOAGULANT (LOVENOX) 40 MG/0.4ML SYRINGE    Inject 0.4 mLs (40 mg) Subcutaneous 2 times daily    GABAPENTIN (NEURONTIN) 100 MG CAPSULE    TAKE ONE CAPSULE BY MOUTH EVERY MORNING, 1 CAPSULE IN THE AFTERNOON, AND THEN TAKE 2 CAPSULES EVERY NIGHT AT BEDTIME    HYDROXYCHLOROQUINE (PLAQUENIL) 200 MG TABLET    Take 1 tablet (200 mg) by mouth daily     LORATADINE (CLARITIN) 10 MG TABLET    Take 10 mg by mouth daily    MAGNESIUM OXIDE 200 MG TABS    Take 200 mg by mouth daily bedtime    METOLAZONE (ZAROXOLYN) 2.5 MG TABLET    Take 1 tablet (2.5 mg) by mouth as needed (ONLY take if instructed to do so by your cardiology team.)    MULTIVITAMIN, THERAPEUTIC WITH MINERALS (THERA-VIT-M) TABS TABLET    Take 1 tablet by mouth daily    POTASSIUM CHLORIDE ER (KLOR-CON M) 20 MEQ CR TABLET    Take 3 tablets (60 mEq) by mouth daily    PREDNISONE (DELTASONE) 1 MG TABLET    Take 3 tablets (3 mg) by mouth daily - Oral    PREDNISONE (DELTASONE) 5 MG TABLET    10 mg when sick for 3 days or until back to baseline for secondary adrenal insufficiency    SPIRONOLACTONE (ALDACTONE) 25 MG TABLET    Take 0.5 tablets (12.5 mg) by mouth daily    SULFAMETHOXAZOLE-TRIMETHOPRIM (BACTRIM DS) 800-160 MG TABLET    Take 1 tablet by mouth 2 times daily    TORSEMIDE (DEMADEX) 20 MG TABLET    Take 1 tablet (20 mg) by mouth daily    UNABLE TO FIND    MEDICATION NAME: Relief CBD soft gels    UNABLE TO FIND    MEDICATION NAME: CBD cream    VITAMIN D (CHOLECALCIFEROL) 10 MCG (400 UNIT) TABS    Take 1 tablet by mouth daily    ZOLEDRONIC ACID (RECLAST) 5 MG/100ML SOLN INFUSION       Modified Medications    No medications on file   Discontinued Medications    No medications on file       ALLERGIES:  Allergies   Allergen Reactions    Blood Transfusion Related (Informational Only) Hives     Hives with platelets. Give benadryl premedication.    Amiodarone Other (See Comments) and Difficulty breathing     Lethargic and had trouble breathing- occurred in 2017    Metoprolol Other (See Comments)     Pt and  report that metoprolol does not work for her and also reports feeling unwell with this medication. She has been able to tolerate atenolol, which as worked in controlling her HR.     Penicillins Other (See Comments)     Childhood reaction, concern for tongue swelling    Tape [Adhesive Tape] Rash        Social History  Social History     Tobacco Use    Smoking status: Never    Smokeless tobacco: Never   Vaping Use    Vaping Use: Never used   Substance Use Topics    Alcohol use: Yes     Comment: 2 drinks per week    Drug use: Never       Any Abnormal Recent Diagnostics? Yes  Platelet count 139,000 on 1/15/2024: Has history of mild, chronic thrombocytopenia.  Should not affect proceeding with surgery at this level.  Blood glucose 123 on 1/11/2024: History of prediabetes.  Most recent hemoglobin A1c 5.1 on 12/22/2023.    On empagliflozin (Jardiance).  We will monitor BG's closely during hospital stay.  Goal BG <180.  Creatinine 0.93, GFR 69, BUN 27.1 on 1/11/2024: Shows stable chronic kidney disease-stage 3b.     Discharge Planning:   Planning to discharge home on POD 1 in the morning with her  helping her.       01/16/24 1206   Discharge Planning   Patient/Family Anticipates Transition to home with family   Concerns to be Addressed all concerns addressed in this encounter   Living Arrangements   People in Home spouse   Type of Residence Private Residence   Is your private residence a single family home or apartment? Single family home   Number of Stairs, Within Home, Primary none  (2 exterior steps)   Stair Railings, Within Home, Primary railings safe and in good condition   Once home, are you able to live on one level? Yes   Which level? Main Level   Bathroom Shower/Tub Walk-in shower   Equipment Currently Used at Home shower chair;grab bar, tub/shower;raised toilet seat;walker, rolling;walker, standard;dressing device   Support System   Support Systems Spouse/Significant Other  (, Wolf)   Do you have someone available to stay with you one or two nights once you are home? Yes       ELIZABETH Woods, CNP   Advanced Practice Nurse Navigator- Orthopedics  [facility]   Phone: [phone]

## 2024-01-24 ENCOUNTER — ANESTHESIA (OUTPATIENT)
Dept: SURGERY | Facility: CLINIC | Age: 64
End: 2024-01-24
Payer: COMMERCIAL

## 2024-01-24 ENCOUNTER — ANESTHESIA EVENT (OUTPATIENT)
Dept: SURGERY | Facility: CLINIC | Age: 64
End: 2024-01-24
Payer: COMMERCIAL

## 2024-01-24 ENCOUNTER — HOSPITAL ENCOUNTER (OUTPATIENT)
Facility: CLINIC | Age: 64
Discharge: HOME | End: 2024-01-25
Attending: ORTHOPAEDIC SURGERY | Admitting: ORTHOPAEDIC SURGERY
Payer: COMMERCIAL

## 2024-01-24 ENCOUNTER — APPOINTMENT (OUTPATIENT)
Dept: RADIOLOGY | Facility: CLINIC | Age: 64
End: 2024-01-24
Attending: ORTHOPAEDIC SURGERY
Payer: COMMERCIAL

## 2024-01-24 DIAGNOSIS — Z96.649 STATUS POST TOTAL REPLACEMENT OF HIP, UNSPECIFIED LATERALITY: ICD-10-CM

## 2024-01-24 DIAGNOSIS — M16.12 PRIMARY OSTEOARTHRITIS OF LEFT HIP: Primary | ICD-10-CM

## 2024-01-24 LAB
ANION GAP SERPL CALCULATED.3IONS-SCNC: 9 MMOL/L (ref 7–15)
BUN SERPL-MCNC: 26.8 MG/DL (ref 8–23)
CALCIUM SERPL-MCNC: 9.4 MG/DL (ref 8.8–10.2)
CHLORIDE SERPL-SCNC: 98 MMOL/L (ref 98–107)
CREAT SERPL-MCNC: 0.89 MG/DL (ref 0.51–1.17)
DEPRECATED HCO3 PLAS-SCNC: 28 MMOL/L (ref 22–29)
EGFRCR SERPLBLD CKD-EPI 2021: 72 ML/MIN/1.73M2
GLUCOSE BLDC GLUCOMTR-MCNC: 145 MG/DL (ref 70–99)
GLUCOSE SERPL-MCNC: 91 MG/DL (ref 70–99)
POTASSIUM SERPL-SCNC: 4.1 MMOL/L (ref 3.4–5.3)
SODIUM SERPL-SCNC: 135 MMOL/L (ref 135–145)

## 2024-01-24 PROCEDURE — 250N000013 HC RX MED GY IP 250 OP 250 PS 637: Performed by: ORTHOPAEDIC SURGERY

## 2024-01-24 PROCEDURE — 250N000013 HC RX MED GY IP 250 OP 250 PS 637: Performed by: PHYSICIAN ASSISTANT

## 2024-01-24 PROCEDURE — 250N000011 HC RX IP 250 OP 636: Performed by: ORTHOPAEDIC SURGERY

## 2024-01-24 PROCEDURE — 250N000011 HC RX IP 250 OP 636: Performed by: PHYSICIAN ASSISTANT

## 2024-01-24 PROCEDURE — 999N000182 XR SURGERY CARM FLUORO GREATER THAN 5 MIN: Mod: TC

## 2024-01-24 PROCEDURE — 999N000141 HC STATISTIC PRE-PROCEDURE NURSING ASSESSMENT: Performed by: ORTHOPAEDIC SURGERY

## 2024-01-24 PROCEDURE — 250N000011 HC RX IP 250 OP 636: Performed by: NURSE ANESTHETIST, CERTIFIED REGISTERED

## 2024-01-24 PROCEDURE — 370N000017 HC ANESTHESIA TECHNICAL FEE, PER MIN: Performed by: ORTHOPAEDIC SURGERY

## 2024-01-24 PROCEDURE — 710N000010 HC RECOVERY PHASE 1, LEVEL 2, PER MIN: Performed by: ORTHOPAEDIC SURGERY

## 2024-01-24 PROCEDURE — 80048 BASIC METABOLIC PNL TOTAL CA: CPT | Performed by: NURSE PRACTITIONER

## 2024-01-24 PROCEDURE — 250N000025 HC SEVOFLURANE, PER MIN: Performed by: ORTHOPAEDIC SURGERY

## 2024-01-24 PROCEDURE — 360N000085 HC SURGERY LEVEL 5 W/ FLUORO, PER MIN: Performed by: ORTHOPAEDIC SURGERY

## 2024-01-24 PROCEDURE — 250N000013 HC RX MED GY IP 250 OP 250 PS 637: Performed by: STUDENT IN AN ORGANIZED HEALTH CARE EDUCATION/TRAINING PROGRAM

## 2024-01-24 PROCEDURE — 82962 GLUCOSE BLOOD TEST: CPT

## 2024-01-24 PROCEDURE — C1776 JOINT DEVICE (IMPLANTABLE): HCPCS | Performed by: ORTHOPAEDIC SURGERY

## 2024-01-24 PROCEDURE — 258N000003 HC RX IP 258 OP 636: Performed by: ORTHOPAEDIC SURGERY

## 2024-01-24 PROCEDURE — 258N000001 HC RX 258: Performed by: ORTHOPAEDIC SURGERY

## 2024-01-24 PROCEDURE — 99222 1ST HOSP IP/OBS MODERATE 55: CPT | Performed by: STUDENT IN AN ORGANIZED HEALTH CARE EDUCATION/TRAINING PROGRAM

## 2024-01-24 PROCEDURE — 272N000001 HC OR GENERAL SUPPLY STERILE: Performed by: ORTHOPAEDIC SURGERY

## 2024-01-24 PROCEDURE — 258N000003 HC RX IP 258 OP 636: Performed by: ANESTHESIOLOGY

## 2024-01-24 PROCEDURE — 258N000003 HC RX IP 258 OP 636: Performed by: NURSE ANESTHETIST, CERTIFIED REGISTERED

## 2024-01-24 PROCEDURE — C1713 ANCHOR/SCREW BN/BN,TIS/BN: HCPCS | Performed by: ORTHOPAEDIC SURGERY

## 2024-01-24 PROCEDURE — 36415 COLL VENOUS BLD VENIPUNCTURE: CPT | Performed by: NURSE PRACTITIONER

## 2024-01-24 PROCEDURE — 250N000009 HC RX 250: Performed by: NURSE ANESTHETIST, CERTIFIED REGISTERED

## 2024-01-24 DEVICE — PINNACLE GRIPTION ACETABULAR SHELL SECTOR 48MM OD
Type: IMPLANTABLE DEVICE | Site: HIP | Status: FUNCTIONAL
Brand: PINNACLE GRIPTION

## 2024-01-24 DEVICE — PINNACLE CANCELLOUS BONE SCREW 6.5MM X 30MM
Type: IMPLANTABLE DEVICE | Site: HIP | Status: FUNCTIONAL
Brand: PINNACLE

## 2024-01-24 DEVICE — ACTIS DUOFIX HIP PROSTHESIS (FEMORAL STEM 12/14 TAPER CEMENTLESS SIZE 5 STD COLLAR)  CE
Type: IMPLANTABLE DEVICE | Site: HIP | Status: FUNCTIONAL
Brand: ACTIS

## 2024-01-24 DEVICE — BIOLOX DELTA CERAMIC FEMORAL HEAD 32MM DIA +1 12/14 TAPER
Type: IMPLANTABLE DEVICE | Site: HIP | Status: FUNCTIONAL
Brand: BIOLOX DELTA

## 2024-01-24 DEVICE — PINNACLE HIP SOLUTIONS ALTRX POLYETHYLENE ACETABULAR LINER +4 NEUTRAL 32MM ID 48MM OD
Type: IMPLANTABLE DEVICE | Site: HIP | Status: FUNCTIONAL
Brand: PINNACLE ALTRX

## 2024-01-24 RX ORDER — ONDANSETRON 2 MG/ML
4 INJECTION INTRAMUSCULAR; INTRAVENOUS EVERY 6 HOURS PRN
Status: DISCONTINUED | OUTPATIENT
Start: 2024-01-24 | End: 2024-01-25 | Stop reason: HOSPADM

## 2024-01-24 RX ORDER — DOCUSATE SODIUM 100 MG/1
100 CAPSULE, LIQUID FILLED ORAL 2 TIMES DAILY PRN
Status: DISCONTINUED | OUTPATIENT
Start: 2024-01-24 | End: 2024-01-25 | Stop reason: HOSPADM

## 2024-01-24 RX ORDER — AMOXICILLIN 250 MG
1 CAPSULE ORAL 2 TIMES DAILY
Status: DISCONTINUED | OUTPATIENT
Start: 2024-01-24 | End: 2024-01-25 | Stop reason: HOSPADM

## 2024-01-24 RX ORDER — ONDANSETRON 2 MG/ML
INJECTION INTRAMUSCULAR; INTRAVENOUS PRN
Status: DISCONTINUED | OUTPATIENT
Start: 2024-01-24 | End: 2024-01-24

## 2024-01-24 RX ORDER — GABAPENTIN 100 MG/1
200 CAPSULE ORAL AT BEDTIME
COMMUNITY

## 2024-01-24 RX ORDER — CEFAZOLIN SODIUM 1 G/3ML
1 INJECTION, POWDER, FOR SOLUTION INTRAMUSCULAR; INTRAVENOUS EVERY 8 HOURS
Qty: 10 ML | Refills: 0 | Status: COMPLETED | OUTPATIENT
Start: 2024-01-24 | End: 2024-01-25

## 2024-01-24 RX ORDER — FENTANYL CITRATE 50 UG/ML
25 INJECTION, SOLUTION INTRAMUSCULAR; INTRAVENOUS EVERY 5 MIN PRN
Status: DISCONTINUED | OUTPATIENT
Start: 2024-01-24 | End: 2024-01-24 | Stop reason: HOSPADM

## 2024-01-24 RX ORDER — FENTANYL CITRATE 50 UG/ML
INJECTION, SOLUTION INTRAMUSCULAR; INTRAVENOUS PRN
Status: DISCONTINUED | OUTPATIENT
Start: 2024-01-24 | End: 2024-01-24

## 2024-01-24 RX ORDER — GABAPENTIN 100 MG/1
100 CAPSULE ORAL 2 TIMES DAILY
COMMUNITY

## 2024-01-24 RX ORDER — LIDOCAINE HYDROCHLORIDE 10 MG/ML
INJECTION, SOLUTION INFILTRATION; PERINEURAL PRN
Status: DISCONTINUED | OUTPATIENT
Start: 2024-01-24 | End: 2024-01-24

## 2024-01-24 RX ORDER — ENOXAPARIN SODIUM 100 MG/ML
40 INJECTION SUBCUTANEOUS 2 TIMES DAILY
Status: DISCONTINUED | OUTPATIENT
Start: 2024-01-24 | End: 2024-01-24

## 2024-01-24 RX ORDER — SODIUM CHLORIDE, SODIUM LACTATE, POTASSIUM CHLORIDE, CALCIUM CHLORIDE 600; 310; 30; 20 MG/100ML; MG/100ML; MG/100ML; MG/100ML
INJECTION, SOLUTION INTRAVENOUS CONTINUOUS
Status: DISCONTINUED | OUTPATIENT
Start: 2024-01-24 | End: 2024-01-24

## 2024-01-24 RX ORDER — HYDROMORPHONE HCL IN WATER/PF 6 MG/30 ML
0.4 PATIENT CONTROLLED ANALGESIA SYRINGE INTRAVENOUS EVERY 5 MIN PRN
Status: DISCONTINUED | OUTPATIENT
Start: 2024-01-24 | End: 2024-01-24 | Stop reason: HOSPADM

## 2024-01-24 RX ORDER — DIPHENHYDRAMINE HYDROCHLORIDE 50 MG/ML
25 INJECTION INTRAMUSCULAR; INTRAVENOUS EVERY 6 HOURS PRN
Status: DISCONTINUED | OUTPATIENT
Start: 2024-01-24 | End: 2024-01-24 | Stop reason: HOSPADM

## 2024-01-24 RX ORDER — LIDOCAINE 40 MG/G
CREAM TOPICAL
Status: DISCONTINUED | OUTPATIENT
Start: 2024-01-24 | End: 2024-01-24

## 2024-01-24 RX ORDER — GABAPENTIN 100 MG/1
200 CAPSULE ORAL AT BEDTIME
Status: DISCONTINUED | OUTPATIENT
Start: 2024-01-24 | End: 2024-01-25 | Stop reason: HOSPADM

## 2024-01-24 RX ORDER — POLYETHYLENE GLYCOL 3350 17 G/17G
17 POWDER, FOR SOLUTION ORAL DAILY
Status: DISCONTINUED | OUTPATIENT
Start: 2024-01-25 | End: 2024-01-25 | Stop reason: HOSPADM

## 2024-01-24 RX ORDER — PROCHLORPERAZINE MALEATE 10 MG
10 TABLET ORAL EVERY 6 HOURS PRN
Status: DISCONTINUED | OUTPATIENT
Start: 2024-01-24 | End: 2024-01-25 | Stop reason: HOSPADM

## 2024-01-24 RX ORDER — DILTIAZEM HYDROCHLORIDE 240 MG/1
240 CAPSULE, EXTENDED RELEASE ORAL DAILY
Status: DISCONTINUED | OUTPATIENT
Start: 2024-01-25 | End: 2024-01-25 | Stop reason: HOSPADM

## 2024-01-24 RX ORDER — ONDANSETRON 4 MG/1
4 TABLET, ORALLY DISINTEGRATING ORAL EVERY 6 HOURS PRN
Status: DISCONTINUED | OUTPATIENT
Start: 2024-01-24 | End: 2024-01-25 | Stop reason: HOSPADM

## 2024-01-24 RX ORDER — ACETAMINOPHEN 325 MG/1
650 TABLET ORAL EVERY 4 HOURS PRN
Status: DISCONTINUED | OUTPATIENT
Start: 2024-01-27 | End: 2024-01-25 | Stop reason: HOSPADM

## 2024-01-24 RX ORDER — ACETAMINOPHEN 325 MG/1
975 TABLET ORAL EVERY 8 HOURS
Qty: 27 TABLET | Refills: 0 | Status: DISCONTINUED | OUTPATIENT
Start: 2024-01-24 | End: 2024-01-25 | Stop reason: HOSPADM

## 2024-01-24 RX ORDER — ONDANSETRON 4 MG/1
4 TABLET, ORALLY DISINTEGRATING ORAL EVERY 30 MIN PRN
Status: DISCONTINUED | OUTPATIENT
Start: 2024-01-24 | End: 2024-01-24 | Stop reason: HOSPADM

## 2024-01-24 RX ORDER — TRANEXAMIC ACID 650 MG/1
1950 TABLET ORAL ONCE
Status: COMPLETED | OUTPATIENT
Start: 2024-01-24 | End: 2024-01-24

## 2024-01-24 RX ORDER — ONDANSETRON 2 MG/ML
4 INJECTION INTRAMUSCULAR; INTRAVENOUS EVERY 30 MIN PRN
Status: DISCONTINUED | OUTPATIENT
Start: 2024-01-24 | End: 2024-01-24 | Stop reason: HOSPADM

## 2024-01-24 RX ORDER — DIPHENHYDRAMINE HCL 25 MG
25 CAPSULE ORAL EVERY 6 HOURS PRN
Status: DISCONTINUED | OUTPATIENT
Start: 2024-01-24 | End: 2024-01-24 | Stop reason: HOSPADM

## 2024-01-24 RX ORDER — HALOPERIDOL 5 MG/ML
1 INJECTION INTRAMUSCULAR
Status: DISCONTINUED | OUTPATIENT
Start: 2024-01-24 | End: 2024-01-24 | Stop reason: HOSPADM

## 2024-01-24 RX ORDER — LIDOCAINE 40 MG/G
CREAM TOPICAL
Status: DISCONTINUED | OUTPATIENT
Start: 2024-01-24 | End: 2024-01-25 | Stop reason: HOSPADM

## 2024-01-24 RX ORDER — DIAZEPAM 10 MG/2ML
2.5 INJECTION, SOLUTION INTRAMUSCULAR; INTRAVENOUS
Status: DISCONTINUED | OUTPATIENT
Start: 2024-01-24 | End: 2024-01-24 | Stop reason: HOSPADM

## 2024-01-24 RX ORDER — CEFAZOLIN SODIUM/WATER 2 G/20 ML
2 SYRINGE (ML) INTRAVENOUS SEE ADMIN INSTRUCTIONS
Status: DISCONTINUED | OUTPATIENT
Start: 2024-01-24 | End: 2024-01-24

## 2024-01-24 RX ORDER — GABAPENTIN 100 MG/1
100 CAPSULE ORAL 2 TIMES DAILY
Status: DISCONTINUED | OUTPATIENT
Start: 2024-01-25 | End: 2024-01-25 | Stop reason: HOSPADM

## 2024-01-24 RX ORDER — ETOMIDATE 2 MG/ML
INJECTION INTRAVENOUS PRN
Status: DISCONTINUED | OUTPATIENT
Start: 2024-01-24 | End: 2024-01-24

## 2024-01-24 RX ORDER — ASPIRIN 81 MG/1
81 TABLET, CHEWABLE ORAL DAILY
Status: DISCONTINUED | OUTPATIENT
Start: 2024-01-24 | End: 2024-01-25 | Stop reason: HOSPADM

## 2024-01-24 RX ORDER — HYDROXYCHLOROQUINE SULFATE 200 MG/1
200 TABLET, FILM COATED ORAL DAILY
Status: DISCONTINUED | OUTPATIENT
Start: 2024-01-25 | End: 2024-01-25 | Stop reason: HOSPADM

## 2024-01-24 RX ORDER — SODIUM CHLORIDE, SODIUM LACTATE, POTASSIUM CHLORIDE, CALCIUM CHLORIDE 600; 310; 30; 20 MG/100ML; MG/100ML; MG/100ML; MG/100ML
INJECTION, SOLUTION INTRAVENOUS CONTINUOUS
Status: DISCONTINUED | OUTPATIENT
Start: 2024-01-24 | End: 2024-01-25 | Stop reason: HOSPADM

## 2024-01-24 RX ORDER — HYDROMORPHONE HCL IN WATER/PF 6 MG/30 ML
0.4 PATIENT CONTROLLED ANALGESIA SYRINGE INTRAVENOUS
Status: DISCONTINUED | OUTPATIENT
Start: 2024-01-24 | End: 2024-01-25 | Stop reason: HOSPADM

## 2024-01-24 RX ORDER — OXYCODONE HYDROCHLORIDE 5 MG/1
10 TABLET ORAL EVERY 4 HOURS PRN
Status: DISCONTINUED | OUTPATIENT
Start: 2024-01-24 | End: 2024-01-25 | Stop reason: HOSPADM

## 2024-01-24 RX ORDER — BISACODYL 10 MG
10 SUPPOSITORY, RECTAL RECTAL DAILY PRN
Status: DISCONTINUED | OUTPATIENT
Start: 2024-01-24 | End: 2024-01-25 | Stop reason: HOSPADM

## 2024-01-24 RX ORDER — OXYCODONE HYDROCHLORIDE 5 MG/1
5 TABLET ORAL EVERY 4 HOURS PRN
Status: DISCONTINUED | OUTPATIENT
Start: 2024-01-24 | End: 2024-01-25 | Stop reason: HOSPADM

## 2024-01-24 RX ORDER — ACETAMINOPHEN 325 MG/1
975 TABLET ORAL ONCE
Status: COMPLETED | OUTPATIENT
Start: 2024-01-24 | End: 2024-01-24

## 2024-01-24 RX ORDER — HYDROMORPHONE HCL IN WATER/PF 6 MG/30 ML
0.2 PATIENT CONTROLLED ANALGESIA SYRINGE INTRAVENOUS EVERY 5 MIN PRN
Status: DISCONTINUED | OUTPATIENT
Start: 2024-01-24 | End: 2024-01-24

## 2024-01-24 RX ORDER — CEFAZOLIN SODIUM/WATER 2 G/20 ML
2 SYRINGE (ML) INTRAVENOUS
Status: COMPLETED | OUTPATIENT
Start: 2024-01-24 | End: 2024-01-24

## 2024-01-24 RX ORDER — HYDROMORPHONE HCL IN WATER/PF 6 MG/30 ML
0.2 PATIENT CONTROLLED ANALGESIA SYRINGE INTRAVENOUS
Status: DISCONTINUED | OUTPATIENT
Start: 2024-01-24 | End: 2024-01-25 | Stop reason: HOSPADM

## 2024-01-24 RX ORDER — TORSEMIDE 20 MG/1
20 TABLET ORAL DAILY
Status: DISCONTINUED | OUTPATIENT
Start: 2024-01-25 | End: 2024-01-25 | Stop reason: HOSPADM

## 2024-01-24 RX ORDER — PROPOFOL 10 MG/ML
INJECTION, EMULSION INTRAVENOUS PRN
Status: DISCONTINUED | OUTPATIENT
Start: 2024-01-24 | End: 2024-01-24

## 2024-01-24 RX ORDER — FENTANYL CITRATE 50 UG/ML
50 INJECTION, SOLUTION INTRAMUSCULAR; INTRAVENOUS EVERY 5 MIN PRN
Status: DISCONTINUED | OUTPATIENT
Start: 2024-01-24 | End: 2024-01-24 | Stop reason: HOSPADM

## 2024-01-24 RX ORDER — DEXAMETHASONE SODIUM PHOSPHATE 10 MG/ML
INJECTION, SOLUTION INTRAMUSCULAR; INTRAVENOUS PRN
Status: DISCONTINUED | OUTPATIENT
Start: 2024-01-24 | End: 2024-01-24

## 2024-01-24 RX ADMIN — MIDAZOLAM 2 MG: 1 INJECTION INTRAMUSCULAR; INTRAVENOUS at 11:29

## 2024-01-24 RX ADMIN — ASPIRIN 81 MG CHEWABLE TABLET 81 MG: 81 TABLET CHEWABLE at 22:42

## 2024-01-24 RX ADMIN — PROPOFOL 50 MG: 10 INJECTION, EMULSION INTRAVENOUS at 11:36

## 2024-01-24 RX ADMIN — FENTANYL CITRATE 50 MCG: 50 INJECTION INTRAMUSCULAR; INTRAVENOUS at 12:10

## 2024-01-24 RX ADMIN — HYDROMORPHONE HYDROCHLORIDE 0.5 MG: 1 INJECTION, SOLUTION INTRAMUSCULAR; INTRAVENOUS; SUBCUTANEOUS at 13:07

## 2024-01-24 RX ADMIN — OXYCODONE HYDROCHLORIDE 5 MG: 5 TABLET ORAL at 14:05

## 2024-01-24 RX ADMIN — Medication 200 MG: at 22:42

## 2024-01-24 RX ADMIN — SUGAMMADEX 50 MG: 100 INJECTION, SOLUTION INTRAVENOUS at 12:55

## 2024-01-24 RX ADMIN — DEXAMETHASONE SODIUM PHOSPHATE 10 MG: 10 INJECTION, SOLUTION INTRAMUSCULAR; INTRAVENOUS at 11:39

## 2024-01-24 RX ADMIN — HYDROMORPHONE HYDROCHLORIDE 0.5 MG: 1 INJECTION, SOLUTION INTRAMUSCULAR; INTRAVENOUS; SUBCUTANEOUS at 12:43

## 2024-01-24 RX ADMIN — CEFAZOLIN 1 G: 1 INJECTION, POWDER, FOR SOLUTION INTRAMUSCULAR; INTRAVENOUS at 22:43

## 2024-01-24 RX ADMIN — SODIUM CHLORIDE, POTASSIUM CHLORIDE, SODIUM LACTATE AND CALCIUM CHLORIDE: 600; 310; 30; 20 INJECTION, SOLUTION INTRAVENOUS at 22:43

## 2024-01-24 RX ADMIN — PHENYLEPHRINE HYDROCHLORIDE 100 MCG: 10 INJECTION INTRAVENOUS at 11:52

## 2024-01-24 RX ADMIN — Medication 2 G: at 11:30

## 2024-01-24 RX ADMIN — SODIUM CHLORIDE, POTASSIUM CHLORIDE, SODIUM LACTATE AND CALCIUM CHLORIDE: 600; 310; 30; 20 INJECTION, SOLUTION INTRAVENOUS at 09:56

## 2024-01-24 RX ADMIN — ETOMIDATE 8 MG: 2 INJECTION, SOLUTION INTRAVENOUS at 11:36

## 2024-01-24 RX ADMIN — OXYCODONE HYDROCHLORIDE 10 MG: 5 TABLET ORAL at 22:44

## 2024-01-24 RX ADMIN — OXYCODONE HYDROCHLORIDE 5 MG: 5 TABLET ORAL at 17:54

## 2024-01-24 RX ADMIN — SENNOSIDES AND DOCUSATE SODIUM 1 TABLET: 8.6; 5 TABLET ORAL at 22:42

## 2024-01-24 RX ADMIN — ONDANSETRON 2 MG: 2 INJECTION INTRAMUSCULAR; INTRAVENOUS at 12:47

## 2024-01-24 RX ADMIN — PHENYLEPHRINE HYDROCHLORIDE 50 MCG: 10 INJECTION INTRAVENOUS at 11:41

## 2024-01-24 RX ADMIN — SUGAMMADEX 50 MG: 100 INJECTION, SOLUTION INTRAVENOUS at 12:53

## 2024-01-24 RX ADMIN — ACETAMINOPHEN 975 MG: 325 TABLET ORAL at 22:42

## 2024-01-24 RX ADMIN — TRANEXAMIC ACID 1950 MG: 650 TABLET ORAL at 09:55

## 2024-01-24 RX ADMIN — FENTANYL CITRATE 50 MCG: 50 INJECTION INTRAMUSCULAR; INTRAVENOUS at 11:33

## 2024-01-24 RX ADMIN — HYDROMORPHONE HYDROCHLORIDE 0.2 MG: 0.2 INJECTION, SOLUTION INTRAMUSCULAR; INTRAVENOUS; SUBCUTANEOUS at 16:03

## 2024-01-24 RX ADMIN — ONDANSETRON 2 MG: 2 INJECTION INTRAMUSCULAR; INTRAVENOUS at 11:39

## 2024-01-24 RX ADMIN — ROCURONIUM BROMIDE 30 MG: 10 INJECTION, SOLUTION INTRAVENOUS at 11:37

## 2024-01-24 RX ADMIN — LIDOCAINE HYDROCHLORIDE 3 ML: 10 INJECTION, SOLUTION INFILTRATION; PERINEURAL at 11:36

## 2024-01-24 RX ADMIN — GABAPENTIN 200 MG: 100 CAPSULE ORAL at 22:42

## 2024-01-24 RX ADMIN — PHENYLEPHRINE HYDROCHLORIDE 0.4 MCG/KG/MIN: 10 INJECTION INTRAVENOUS at 11:55

## 2024-01-24 ASSESSMENT — ACTIVITIES OF DAILY LIVING (ADL)
ADLS_ACUITY_SCORE: 28

## 2024-01-24 NOTE — ANESTHESIA PROCEDURE NOTES
Airway       Patient location during procedure: OR       Procedure Start/Stop Times: 1/24/2024 11:40 AM and 1/24/2024 11:41 AM  Staff -        CRNA: Ruben Smith APRN CRNA       Performed By: CRNA  Consent for Airway        Urgency: elective  Indications and Patient Condition       Indications for airway management: mary-procedural       Induction type:intravenous       Mask difficulty assessment: 1 - vent by mask    Final Airway Details       Final airway type: endotracheal airway       Successful airway: ETT - single  Endotracheal Airway Details        ETT size (mm): 7.0       Cuffed: yes       Successful intubation technique: direct laryngoscopy       DL Blade Type: Shelley 2       Grade View of Cords: 1       Adjucts: stylet       Position: Right       Measured from: lips       Secured at (cm): 20       Bite block used: None    Post intubation assessment        Placement verified by: capnometry, equal breath sounds and chest rise        Number of attempts at approach: 1       Number of other approaches attempted: 0       Secured with: tape       Ease of procedure: easy       Dentition: Intact and Unchanged       Dental guard used and removed. Dental Guard Type: Proguard Red.    Medication(s) Administered   Medication Administration Time: 1/24/2024 11:40 AM

## 2024-01-24 NOTE — INTERVAL H&P NOTE
I have reviewed the virtual H&P that is linked to this encounter. The physical exam performed by anesthesia during this surgical encounter serves as the physical portion of that the virtual H&P. There are no significant changes from that history and physical as noted.    Clinical Conditions Present on Arrival:  Clinically Significant Risk Factors Present on Admission         # Hyponatremia: Lowest Na = 135 mmol/L in last 30 days, will monitor as appropriate        # Drug Induced Coagulation Defect: home medication list includes an anticoagulant medication  # Drug Induced Platelet Defect: home medication list includes an antiplatelet medication

## 2024-01-24 NOTE — ANESTHESIA PREPROCEDURE EVALUATION
Anesthesia Pre-Procedure Evaluation    Patient: Amelia Michel   MRN: [id number] : 1960        Procedure : Procedure(s):  LEFT TOTAL HIP ARTHROPLASTY DIRECT ANTERIOR APPROACH ORTHOGRID          Past Medical History:   Diagnosis Date     Antiplatelet or antithrombotic long-term use      Arrhythmia      Arthritis      Atrial fibrillation (H)      Atrial flutter (H)      Cancer (H) 2017    DLBCL currently in remission     Cardiac arrest (H) 2017     Chronic kidney disease      Congestive heart failure (H)      Diffuse large B-cell lymphoma of extranodal site (H) 2017     Extremity restricted from procedure     Left arm     Fall 2023    Cut on head     H/O blood transfusion reaction     Hives with platelet transfusion, needs pre-medication with tylenol and benadryl     Heart murmur      History of blood clots 2017    PICC line Right arm      History of chemotherapy      History of transfusion      Hypertension      Lymphedema of both lower extremities     wears lymphedema socks     Neuropathy     Feet and hands     NSVT (nonsustained ventricular tachycardia) (H) 2022     Pacemaker 2019     Pericardial effusion 2017     Physical deconditioning      PONV (postoperative nausea and vomiting)      Positive PPD, 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     Prediabetes      Pulmonary hypertension (H)      RA (rheumatoid arthritis) (H)      SSS (sick sinus syndrome) (H) 2019    Pacemaker     Steroid-induced osteoporosis      Thrombocytopenia (H24)      VSD (ventricular septal defect)       Past Surgical History:   Procedure Laterality Date     ANESTHESIA CARDIOVERSION N/A 2017    Procedure: ANESTHESIA CARDIOVERSION;  ANESTHESIA CARDIOVERSION;  Surgeon: GENERIC ANESTHESIA PROVIDER;  Location: [facility] OR     ANESTHESIA CARDIOVERSION  2018    Procedure: ANESTHESIA CARDIOVERSION;;  Surgeon: GENERIC ANESTHESIA PROVIDER;  Location: [facility] OR     ANESTHESIA CARDIOVERSION N/A 2018    Procedure: ANESTHESIA CARDIOVERSION;   Anesthesia Cardioversion ;  Surgeon: GENERIC ANESTHESIA PROVIDER;  Location:  OR     ANESTHESIA CARDIOVERSION N/A 04/12/2018    Procedure: ANESTHESIA CARDIOVERSION;  Cardioversion;  Surgeon: GENERIC ANESTHESIA PROVIDER;  Location:  OR     ARTHRODESIS WRIST  2000    Right wrist     BONE MARROW ASPIRATE &BIOPSY  07/12/2017          BONE MARROW BIOPSY W/ ASPIRATION  07/12/2017     CARDIOVERSION      5/17/17, 3/12/18, 3/23/18, 4/14/18,      COLONOSCOPY N/A 11/19/2019    Procedure: Colonoscopy, With Polypectomy And Biopsy;  Surgeon: Pj Vasques MD;  Location: [facility]     CV CORONARY ANGIOGRAM N/A 11/28/2022    Procedure: Coronary Angiogram;  Surgeon: Sundar Wood MD;  Location:  HEART CARDIAC CATH LAB     CV RIGHT HEART CATH MEASUREMENTS RECORDED N/A 11/28/2022    Procedure: Right Heart Catheterization;  Surgeon: Sundar Wood MD;  Location:  HEART CARDIAC CATH LAB     EP ABLATION PVC N/A 12/01/2022    Procedure: Ablation Premature Ventricular Contractions;  Surgeon: Juan Eaton MD;  Location:  HEART CARDIAC CATH LAB     EP PACEMAKER N/A 12/13/2019    Procedure: EP Pacemaker;  Surgeon: Pj Trent MD;  Location:  HEART CARDIAC CATH LAB     EXCISE LESION UPPER EXTREMITY Left 05/22/2018    Procedure: EXCISE LESION UPPER EXTREMITY;  Left Arm Wound Excision And Closure, Possible Submuscular Transposition of Ulnar Nerve  (Choice Anesthesia);  Surgeon: Kevin Sheldon MD;  Location:  OR     FOOT SURGERY      4 left and 2 right     FOOT SURGERY      4 Left and 2 Right      IMPLANT PACEMAKER  12/13/2019    Dr China Trent  [facility] Cardiac Cath lab      IMPLANT PACEMAKER       RELEASE CARPAL TUNNEL Bilateral      REPAIR VALVE TRICUSPID MINIMALLY INVASIVE N/A 04/10/2023    Procedure: MINIMALLY INVASIVE TRICUSPID VALVE REPLACEMENT USING 29MM ST. NILAM EPIC VALVE, FEMORAL CANNULATION AND RIGHT GROIN CUTDOWN, CARDIOPULMONARY BYPASS, TRANSESOPHAGEAL ECHOCARDIOGRAM PERFORMED BY ANESTHESIA  STAFF;  Surgeon: Chilango Cope MD;  Location: [facility] OR     REPAIR VENTRICULAR SEPTAL DEFECT  1969     TRANSPOSITION ULNAR NERVE (ELBOW) Left 05/22/2018    Procedure: TRANSPOSITION ULNAR NERVE (ELBOW);;  Surgeon: Kevin Sheldon MD;  Location: [facility] OR     VSD REPAIR  1969     ZZC FUSION FOOT BONES,SUBTALAR Right 10/20/2020    Procedure: RIGHT SUBTALAR JOINT FUSION AND CALCANEOCUBOID FUSION;  Surgeon: Nemesio Crow MD;  Location: [facility] OR;  Service: Orthopedics      Allergies   Allergen Reactions     Blood Transfusion Related (Informational Only) Hives     Hives with platelets. Give benadryl premedication.     Amiodarone Other (See Comments) and Difficulty breathing     Lethargic and had trouble breathing- occurred in 2017     Metoprolol Other (See Comments)     Pt and  report that metoprolol does not work for her and also reports feeling unwell with this medication. She has been able to tolerate atenolol, which as worked in controlling her HR.      Penicillins Other (See Comments)     Childhood reaction, concern for tongue swelling     Tape [Adhesive Tape] Rash      Social History     Tobacco Use     Smoking status: Never     Smokeless tobacco: Never   Substance Use Topics     Alcohol use: Yes     Comment: 2 drinks per week      Wt Readings from Last 1 Encounters:   01/24/24 49.5 kg (109 lb 3.2 oz)        Anesthesia Evaluation            ROS/MED HX  ENT/Pulmonary:       Neurologic:       Cardiovascular: Comment: S/p vsd closure    (+)  hypertension- -   -  - -      CHF        pacemaker,             valvular problems/murmurs     pulmonary hypertension,      METS/Exercise Tolerance:     Hematologic:       Musculoskeletal:   (+)  arthritis,             GI/Hepatic:       Renal/Genitourinary:     (+) renal disease,             Endo:       Psychiatric/Substance Use:       Infectious Disease:       Malignancy:       Other:            Physical Exam    Airway  airway exam normal      Mallampati: I    TM distance: > 3 FB   Neck ROM: full   Mouth opening: > 3 cm    Respiratory Devices and Support         Dental  no notable dental history         Cardiovascular   cardiovascular exam normal       Rhythm and rate: regular     Pulmonary   pulmonary exam normal        breath sounds clear to auscultation         OUTSIDE LABS:  CBC:   Lab Results   Component Value Date    WBC 5.4 01/15/2024    WBC 6.0 12/22/2023    HGB 15.2 01/15/2024    HGB 14.2 12/22/2023    HCT 45.4 01/15/2024    HCT 41.6 12/22/2023     (L) 01/15/2024     12/22/2023     BMP:   Lab Results   Component Value Date     01/24/2024     01/11/2024    POTASSIUM 4.1 01/24/2024    POTASSIUM 4.1 01/11/2024    CHLORIDE 98 01/24/2024    CHLORIDE 95 (L) 01/11/2024    CO2 28 01/24/2024    CO2 30 (H) 01/11/2024    BUN 26.8 (H) 01/24/2024    BUN 27.1 (H) 01/11/2024    CR 0.89 01/24/2024    CR 0.93 01/11/2024    GLC 91 01/24/2024     (H) 01/11/2024     COAGS:   Lab Results   Component Value Date    PTT 61 (H) 04/10/2023    INR 1.71 (H) 04/10/2023    FIBR 342 04/10/2023     POC:   Lab Results   Component Value Date     (H) 05/22/2018     HEPATIC:   Lab Results   Component Value Date    ALBUMIN 5.0 09/21/2023    PROTTOTAL 8.2 09/14/2023    ALT 22 09/21/2023    AST 35 09/21/2023    ALKPHOS 116 09/14/2023    BILITOTAL 0.9 09/14/2023     OTHER:   Lab Results   Component Value Date    PH 7.43 04/11/2023    LACT 1.2 04/11/2023    A1C 5.1 12/22/2023    VIKTORIYA 9.4 01/24/2024    PHOS 2.7 04/17/2023    MAG 2.0 04/16/2023    LIPASE 88 05/30/2017    TSH 3.43 02/13/2023    CRP 20.7 (H) 04/20/2022    SED 15 09/21/2023       Anesthesia Plan    ASA Status:  3    NPO Status:  NPO Appropriate    Anesthesia Type: General.     - Airway: ETT              Consents    Anesthesia Plan(s) and associated risks, benefits, and realistic alternatives discussed. Questions answered and patient/representative(s) expressed understanding.     - Discussed:     -  Discussed with:  Patient      - Specific Concerns: patient refuses SAB.           Postoperative Care         - Plan for long acting post-op opioid use   PONV prophylaxis: Ondansetron (or other 5HT-3), Dexamethasone or Solumedrol     Comments:               Elliott Parks MD    I have reviewed the pertinent notes and labs in the chart from the past 30 days and (re)examined the patient.  Any updates or changes from those notes are reflected in this note.     # Hyponatremia: Lowest Na = 135 mmol/L in last 30 days, will monitor as appropriate        # Drug Induced Coagulation Defect: home medication list includes an anticoagulant medication  # Drug Induced Platelet Defect: home medication list includes an antiplatelet medication

## 2024-01-24 NOTE — PROVIDER NOTIFICATION
Clarified with Dr. Quintana that he does want the TXA given after reviewing patient's past medical history.

## 2024-01-24 NOTE — PHARMACY-ADMISSION MEDICATION HISTORY
Pharmacist Admission Medication History    Admission medication history is complete. The information provided in this note is only as accurate as the sources available at the time of the update.    Information Source(s): Patient and CareEverywhere/SureScripts via in-person    Pertinent Information:      Changes made to PTA medication list:  Added: None  Deleted: None  Changed: Claritin to prn    Medication Affordability:  Not including over the counter (OTC) medications, was there a time in the past 3 months when you did not take your medications as prescribed because of cost?: No    Allergies reviewed with patient and updates made in EHR: yes    Medication History Completed By: Rylan Bell RPH 1/24/2024 9:44 AM    PTA Med List   Medication Sig Note Last Dose    acetaminophen (TYLENOL) 325 MG tablet Take 2 tablets (650 mg) by mouth every 4 hours as needed for other (For optimal non-opioid multimodal pain management to improve pain control.)  1/24/2024 at 0630    alendronate (FOSAMAX) 70 MG tablet Take 1 tablet (70 mg) by mouth every 7 days (Patient taking differently: Take 70 mg by mouth every 7 days Mondays)  1/22/2024 at am    aspirin (ASA) 81 MG chewable tablet 1 tablet (81 mg) by Oral or NG Tube route daily (Patient taking differently: 81 mg by Oral or NG Tube route daily Stopping 1/20/24 before surgery)  1/20/2024 at am    calcium carbonate 600 mg-vitamin D 400 units (CALTRATE) 600-400 MG-UNIT per tablet Take 2 tablets by mouth daily  1/23/2024 at am    diltiazem ER (DILT-XR) 240 MG 24 hr ER beaded capsule Take 1 capsule (240 mg) by mouth daily  1/24/2024 at am    docusate sodium (COLACE) 100 MG capsule Take 100 mg by mouth 2 times daily as needed for constipation  prn at prn    empagliflozin (JARDIANCE) 10 MG TABS tablet Take 1 tablet (10 mg) by mouth daily (Patient taking differently: Take 10 mg by mouth daily Stopping 1/20/24 before surgery)  1/20/2024 at am    enoxaparin ANTICOAGULANT (LOVENOX) 40 MG/0.4ML  syringe Inject 0.4 mLs (40 mg) Subcutaneous 2 times daily 1/23/2024: LD 1/23 AM 1/23/2024 at am    gabapentin (NEURONTIN) 100 MG capsule Take 100 mg by mouth 2 times daily AM and Afternoon  1/23/2024 at am    gabapentin (NEURONTIN) 100 MG capsule Take 200 mg by mouth at bedtime  1/23/2024 at pm    hydroxychloroquine (PLAQUENIL) 200 MG tablet Take 1 tablet (200 mg) by mouth daily  1/23/2024 at am    loratadine (CLARITIN) 10 MG tablet Take 10 mg by mouth daily as needed  prn at prn    magnesium oxide 200 MG TABS Take 200 mg by mouth daily bedtime  1/23/2024 at pm    potassium chloride ER (KLOR-CON M) 20 MEQ CR tablet Take 3 tablets (60 mEq) by mouth daily  1/23/2024 at am    predniSONE (DELTASONE) 1 MG tablet Take 3 tablets (3 mg) by mouth daily - Oral  1/23/2024 at am    spironolactone (ALDACTONE) 25 MG tablet Take 0.5 tablets (12.5 mg) by mouth daily  1/24/2024 at am    torsemide (DEMADEX) 20 MG tablet Take 1 tablet (20 mg) by mouth daily  1/23/2024 at am    Vitamin D (Cholecalciferol) 10 MCG (400 UNIT) TABS Take 1 tablet by mouth daily  1/23/2024 at am

## 2024-01-24 NOTE — CONSULTS
[facility]  Consult Note - Hospitalist Service  Date of Admission:  1/24/2024  Consult Requested by: Orthopedic surgery Dr. Quintana  Reason for Consult: Management of chronic medical conditions    Assessment & Plan   Amleia Michel is a 63 year old adult admitted on 1/24/2024.  She has a past medical history significant for HFpEF, severe TR status post valve replacement in 2023, atrial flutter/fibrillation, NSVT status post PVC, sick sinus syndrome status post pacemaker in 2019, cardiac arrest in 2017, rheumatoid arthritis, diffuse large B-cell lymphoma currently in remission who presented to the hospital for elective left hip arthroplasty.  Hospitalist medicine consulted for management of chronic medical conditions.    Heart failure preserved ejection fraction  -At home on torsemide 20 mg daily, spironolactone 12.5 mg daily, Jardiance.  -Appears euvolemic on examination.    Plan  -Continue home torsemide and spironolactone on 1/25  -Hold Jardiance while inpatient  -Stop IV fluids    Atrial flutter/fibrillation  Sick sinus syndrome status post pacemaker  -At home on Eliquis 5 mg twice daily, aspirin 81 mg daily  -Diltiazem 240 mg daily  -Already received her diltiazem today.    Plan  -Continue diltiazem on 1/25/2024  -It appears that Eliquis was resumed by primary team and is planned to be administered on 1/25    Rheumatoid arthritis  -At home on prednisone 50 mg daily, Plaquenil 200 mg daily    Plan  -Continue prednisone and Plaquenil on discharge       Clinically Significant Risk Factors Present on Admission               # Drug Induced Coagulation Defect: home medication list includes an anticoagulant medication  # Drug Induced Platelet Defect: home medication list includes an antiplatelet medication   # Hypertension: Noted on problem list  # Chronic heart failure with preserved ejection fraction: heart failure noted on problem list and last echo with EF >50%          # Pacemaker  present       RAMO GALLEGOS MD  Hospitalist Service  Securely message with [employer] (more info)  Text page via [facility] Paging/Directory   ______________________________________________________________________      History of Present Illness   Evaluated around 4:30 PM.  Patient is feeling well.  She denies any chest pain or shortness of breath.  She denies any nausea or vomiting.  She denies any lightheadedness.      Past Medical History    Past Medical History:   Diagnosis Date    Antiplatelet or antithrombotic long-term use     Arrhythmia     Arthritis     Atrial fibrillation (H)     Atrial flutter (H)     Cancer (H) June 2017    DLBCL currently in remission    Cardiac arrest (H) 2017    Chronic kidney disease     Congestive heart failure (H)     Diffuse large B-cell lymphoma of extranodal site (H) 2017    Extremity restricted from procedure     Left arm    Fall 12/06/2023    Cut on head    H/O blood transfusion reaction     Hives with platelet transfusion, needs pre-medication with tylenol and benadryl    Heart murmur     History of blood clots 2017    PICC line Right arm     History of chemotherapy     History of transfusion     Hypertension     Lymphedema of both lower extremities     wears lymphedema socks    Neuropathy     Feet and hands    NSVT (nonsustained ventricular tachycardia) (H) 12/2022    Pacemaker 2019    Pericardial effusion 2017    Physical deconditioning     PONV (postoperative nausea and vomiting)     Positive PPD, 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    Prediabetes     Pulmonary hypertension (H)     RA (rheumatoid arthritis) (H)     SSS (sick sinus syndrome) (H) 2019    Pacemaker    Steroid-induced osteoporosis     Thrombocytopenia (H24)     VSD (ventricular septal defect)        Past Surgical History   Past Surgical History:   Procedure Laterality Date    ANESTHESIA CARDIOVERSION N/A 05/17/2017    Procedure: ANESTHESIA CARDIOVERSION;  ANESTHESIA CARDIOVERSION;  Surgeon: GENERIC ANESTHESIA PROVIDER;  Location:  OR     ANESTHESIA CARDIOVERSION  03/12/2018    Procedure: ANESTHESIA CARDIOVERSION;;  Surgeon: GENERIC ANESTHESIA PROVIDER;  Location: [facility] OR    ANESTHESIA CARDIOVERSION N/A 03/23/2018    Procedure: ANESTHESIA CARDIOVERSION;  Anesthesia Cardioversion ;  Surgeon: GENERIC ANESTHESIA PROVIDER;  Location: [facility] OR    ANESTHESIA CARDIOVERSION N/A 04/12/2018    Procedure: ANESTHESIA CARDIOVERSION;  Cardioversion;  Surgeon: GENERIC ANESTHESIA PROVIDER;  Location: [facility] OR    ARTHRODESIS WRIST  2000    Right wrist    BONE MARROW ASPIRATE &BIOPSY  07/12/2017         BONE MARROW BIOPSY W/ ASPIRATION  07/12/2017    CARDIOVERSION      5/17/17, 3/12/18, 3/23/18, 4/14/18,     COLONOSCOPY N/A 11/19/2019    Procedure: Colonoscopy, With Polypectomy And Biopsy;  Surgeon: Pj Vasques MD;  Location: [facility]    CV CORONARY ANGIOGRAM N/A 11/28/2022    Procedure: Coronary Angiogram;  Surgeon: Sundar Wood MD;  Location:  HEART CARDIAC CATH LAB    CV RIGHT HEART CATH MEASUREMENTS RECORDED N/A 11/28/2022    Procedure: Right Heart Catheterization;  Surgeon: Sundar Wood MD;  Location: [facility] CARDIAC CATH LAB    EP ABLATION PVC N/A 12/01/2022    Procedure: Ablation Premature Ventricular Contractions;  Surgeon: Juan Eaton MD;  Location:  HEART CARDIAC CATH LAB    EP PACEMAKER N/A 12/13/2019    Procedure: EP Pacemaker;  Surgeon: Pj Trent MD;  Location: [facility] CARDIAC CATH LAB    EXCISE LESION UPPER EXTREMITY Left 05/22/2018    Procedure: EXCISE LESION UPPER EXTREMITY;  Left Arm Wound Excision And Closure, Possible Submuscular Transposition of Ulnar Nerve  (Choice Anesthesia);  Surgeon: Kevin Sheldon MD;  Location:  OR    FOOT SURGERY      4 left and 2 right    FOOT SURGERY      4 Left and 2 Right     IMPLANT PACEMAKER  12/13/2019    Dr China Trent  [facility] Cardiac Cath lab     IMPLANT PACEMAKER      RELEASE CARPAL TUNNEL Bilateral     REPAIR VALVE TRICUSPID MINIMALLY INVASIVE N/A 04/10/2023    Procedure:  MINIMALLY INVASIVE TRICUSPID VALVE REPLACEMENT USING 29MM ST. NILAM EPIC VALVE, FEMORAL CANNULATION AND RIGHT GROIN CUTDOWN, CARDIOPULMONARY BYPASS, TRANSESOPHAGEAL ECHOCARDIOGRAM PERFORMED BY ANESTHESIA STAFF;  Surgeon: Chilango Cope MD;  Location: [facility] OR    REPAIR VENTRICULAR SEPTAL DEFECT  1969    TRANSPOSITION ULNAR NERVE (ELBOW) Left 05/22/2018    Procedure: TRANSPOSITION ULNAR NERVE (ELBOW);;  Surgeon: Kevin Sheldon MD;  Location: [facility] OR    VSD REPAIR  1969    ZZC FUSION FOOT BONES,SUBTALAR Right 10/20/2020    Procedure: RIGHT SUBTALAR JOINT FUSION AND CALCANEOCUBOID FUSION;  Surgeon: Nemesio Crow MD;  Location: [facility] OR;  Service: Orthopedics       Medications   I have reviewed this patient's current medications  Current Facility-Administered Medications   Medication    ceFAZolin (ANCEF) 1 g vial to attach to  ml bag for ADULT or 50 ml bag for PEDS    diazepam (VALIUM) injection 2.5 mg    [START ON 1/25/2024] diltiazem ER (DILT-XR) 24 hr capsule 240 mg    diphenhydrAMINE (BENADRYL) capsule 25 mg    Or    diphenhydrAMINE (BENADRYL) injection 25 mg    docusate sodium (COLACE) capsule 100 mg    fentaNYL (PF) (SUBLIMAZE) injection 25 mcg    fentaNYL (PF) (SUBLIMAZE) injection 50 mcg    [START ON 1/25/2024] gabapentin (NEURONTIN) capsule 100 mg    gabapentin (NEURONTIN) capsule 200 mg    haloperidol lactate (HALDOL) injection 1 mg    HYDROmorphone (DILAUDID) injection 0.2 mg    HYDROmorphone (DILAUDID) injection 0.2 mg    Or    HYDROmorphone (DILAUDID) injection 0.4 mg    HYDROmorphone (DILAUDID) injection 0.4 mg    hydroxychloroquine (PLAQUENIL) tablet 200 mg    lactated ringers infusion    lactated ringers infusion    lidocaine (LMX4) cream    lidocaine 1 % 0.1-1 mL    magnesium oxide TABS 200 mg    ondansetron (ZOFRAN ODT) ODT tab 4 mg    Or    ondansetron (ZOFRAN) injection 4 mg    oxyCODONE (ROXICODONE) tablet 5 mg    Or    oxyCODONE (ROXICODONE) tablet 10 mg    [START ON  1/25/2024] predniSONE (DELTASONE) half-tab 3 mg    sodium chloride (PF) 0.9% PF flush 3 mL    sodium chloride (PF) 0.9% PF flush 3 mL    [START ON 1/25/2024] torsemide (DEMADEX) tablet 20 mg            Physical Exam   Vital Signs: Temp: 97.8  F (36.6  C) Temp src: Temporal BP: 127/66 Pulse: 83   Resp: 13 SpO2: 99 % O2 Device: Simple face mask Oxygen Delivery: 3 LPM  Weight: 109 lbs 3.2 oz    Physical Exam  Constitutional:       General: Amelia Michel is not in acute distress.     Appearance: Amelia Michel is not toxic-appearing.   Cardiovascular:      Rate and Rhythm: Normal rate.      Heart sounds: Murmur heard.   Pulmonary:      Effort: Pulmonary effort is normal. No respiratory distress.      Breath sounds: No wheezing.   Musculoskeletal:      Right lower leg: No edema.      Left lower leg: No edema.   Skin:     General: Skin is warm and dry.   Neurological:      Mental Status: Amelia Michel is alert.   Psychiatric:         Mood and Affect: Mood normal.          Medical Decision Making       50 MINUTES SPENT BY ME on the date of service doing chart review, history, exam, documentation & further activities per the note.      Data     I have personally reviewed the following data over the past 24 hrs:    N/A  \   N/A   / N/A     135 98 26.8 (H) /  91   4.1 28 0.89 \       Imaging results reviewed over the past 24 hrs:   Recent Results (from the past 24 hour(s))   XR Surgery ADAN  Fluoro G/T 5 Min    Narrative    This exam was marked as non-reportable because it will not be read by a   radiologist or a [place] non-radiologist provider.

## 2024-01-24 NOTE — ANESTHESIA CARE TRANSFER NOTE
Patient: Amelia Michel    Procedure: Procedure(s):  LEFT TOTAL HIP ARTHROPLASTY DIRECT ANTERIOR APPROACH ORTHOGRID       Diagnosis: Osteoarthritis of left hip [M16.12]  Diagnosis Additional Information: No value filed.    Anesthesia Type:   General     Note:    Oropharynx: oropharynx clear of all foreign objects  Level of Consciousness: drowsy  Oxygen Supplementation: face mask  Level of Supplemental Oxygen (L/min / FiO2): 6  Independent Airway: airway patency satisfactory and stable  Dentition: dentition unchanged  Vital Signs Stable: post-procedure vital signs reviewed and stable  Report to RN Given: handoff report given  Patient transferred to: PACU    Handoff Report: Identifed the Patient, Identified the Reponsible Provider, Reviewed the pertinent medical history, Discussed the surgical course, Reviewed Intra-OP anesthesia mangement and issues during anesthesia, Set expectations for post-procedure period and Allowed opportunity for questions and acknowledgement of understanding    Vitals:  Vitals Value Taken Time   /56 01/24/24 1313   Temp 97.9    Pulse 74 01/24/24 1314   Resp 75 01/24/24 1314   SpO2 99 % 01/24/24 1314   Vitals shown include unfiled device data.    Electronically Signed By: ELIZABETH Ortiz CRNA  January 24, 2024  1:15 PM

## 2024-01-24 NOTE — OP NOTE
DATE OF SERVICE:1/24/2024    PREOPERATIVE DIAGNOSIS: Avascular necrosis of left hip [M16.12]    POSTOPERATIVE DIAGNOSIS: Avascular necrosis of left hip [M16.12]     OPERATION: left total hip arthroplasty, direct anterior approach.     ANESTHESIA: Choice     PREP: Routine.     SURGEON: Romeo Quintana MD.     ASSISTANT: Philip Ortiz PA-C.     CORI was needed for this case secondary to exposure needs.     INDICATION: Amelia Michel is a 63 year old year old  with extensive arthritis there left hip. They have failed nonsurgical treatment, tried injections, modification of   activities, as well as anti-inflammatory medications. They has night pain, ADLs symptoms daily, and limited range of motion and limited function.     Findings: Avascular porosis of the left hip with collapse.  We did use orthogrid to help facilitate cup position as well as offset and leg length calculations.  We did also have more oozing during this case since the patient was on both Eliquis and Lovenox.    Patient brought to the operating room, placed supine, given a spinal anesthetic, was given perioperative antibiotics. The patient then placed on a hana table after   left hip was then prepped and draped in normal  sterile fashion. Appropriate timeout was completed. At this time, fluoroscopy was brought in the pelvis rotated appropriately and an AP x-ray taken of the left and right hip for postoperative templating. Next, a 10 cm incision was made distal and  lateral to the ASIS, carried down to the tensor fascia. Tensor retracted laterally. The circumflex was identified and coagulated. Retractor was placed over the anterior brim of the acetabulum and superior femoral neck and anterior capsulotomy was then completed. 47A capsule was then placed intracapsular and a femoral neck cut was completed and femoral head removed without difficulty. They had extensive osteophytes and grade 4 changes. At this point in time, retractors both anteriorly and  posteriorly from the acetabulum showed relatively anteverted acetabulum.   Removed some osteophytes. Went ahead and reamed to 47 mm outer diameter, impacted a  48mm Gription Goodrich cup in the appropriate amount of anteversion and   inclination, confirmed by fluoroscopy. Next 2-30 mm screw was placed in a upper outer quadrant and a +4   neutral liner for a 32 mm head was impacted.        Next, attention was brought to the  femoral side. The leg was rotated 120 degrees, fully adducted and extended. Went   and broached to a size 5 broach with a size 5 broach and a  1 mm head. I reduced the hip. Offset and leg lengths were essentially identical   to her right hip. There was good fit and fill throughout. Trial implants were   removed. I went ahead and impacted a size 5 Actis stem. A 1 x 32   mm ceramic head was impacted. Hip was then located. Final fluoroscopy films showed concentric reduction, appropriate offset and leg lengths. No fracture identified. The wound was then copiously irrigated. The capsule repaired with a Vicryl suture.  The mary-capsular area was injected with a Orthopedic injection. The fascia with a PDS suture, 2-0 Vicryl and a Monocryl stitch. Sterile compressive dressing. The patient brought to recovery in stable condition. All sponge and needle counts correct. No intraoperative complication identified. No specimens.    Blood loss approximately 500 mL.     JAG HESS MD

## 2024-01-24 NOTE — ANESTHESIA POSTPROCEDURE EVALUATION
Patient: Amelia Michel    Procedure: Procedure(s):  LEFT TOTAL HIP ARTHROPLASTY DIRECT ANTERIOR APPROACH ORTHOGRID       Anesthesia Type:  General    Note:  Disposition: Inpatient   Postop Pain Control: Uneventful            Sign Out: Well controlled pain   PONV: No   Neuro/Psych: Uneventful            Sign Out: Acceptable/Baseline neuro status   Airway/Respiratory: Uneventful            Sign Out: Acceptable/Baseline resp. status   CV/Hemodynamics: Uneventful            Sign Out: Acceptable CV status; No obvious hypovolemia; No obvious fluid overload   Other NRE: NONE   DID A NON-ROUTINE EVENT OCCUR? No           Last vitals:  Vitals Value Taken Time   /59 01/24/24 1340   Temp 36.6  C (97.8  F) 01/24/24 1319   Pulse 75 01/24/24 1347   Resp 19 01/24/24 1347   SpO2 99 % 01/24/24 1347   Vitals shown include unfiled device data.    Electronically Signed By: Elliott Parks MD  January 24, 2024  1:49 PM

## 2024-01-25 ENCOUNTER — APPOINTMENT (OUTPATIENT)
Dept: PHYSICAL THERAPY | Facility: CLINIC | Age: 64
End: 2024-01-25
Attending: ORTHOPAEDIC SURGERY
Payer: COMMERCIAL

## 2024-01-25 ENCOUNTER — APPOINTMENT (OUTPATIENT)
Dept: OCCUPATIONAL THERAPY | Facility: CLINIC | Age: 64
End: 2024-01-25
Attending: ORTHOPAEDIC SURGERY
Payer: COMMERCIAL

## 2024-01-25 VITALS
HEART RATE: 75 BPM | RESPIRATION RATE: 18 BRPM | WEIGHT: 110.23 LBS | OXYGEN SATURATION: 97 % | DIASTOLIC BLOOD PRESSURE: 61 MMHG | SYSTOLIC BLOOD PRESSURE: 115 MMHG | TEMPERATURE: 98.9 F | HEIGHT: 57 IN | BODY MASS INDEX: 23.78 KG/M2

## 2024-01-25 LAB
FASTING STATUS PATIENT QL REPORTED: ABNORMAL
GLUCOSE SERPL-MCNC: 140 MG/DL (ref 70–99)
HGB BLD-MCNC: 11.1 G/DL (ref 11.7–17.7)

## 2024-01-25 PROCEDURE — 250N000011 HC RX IP 250 OP 636: Performed by: ORTHOPAEDIC SURGERY

## 2024-01-25 PROCEDURE — 99232 SBSQ HOSP IP/OBS MODERATE 35: CPT | Performed by: STUDENT IN AN ORGANIZED HEALTH CARE EDUCATION/TRAINING PROGRAM

## 2024-01-25 PROCEDURE — 97535 SELF CARE MNGMENT TRAINING: CPT | Mod: GO

## 2024-01-25 PROCEDURE — 85018 HEMOGLOBIN: CPT | Performed by: ORTHOPAEDIC SURGERY

## 2024-01-25 PROCEDURE — 97116 GAIT TRAINING THERAPY: CPT | Mod: GP

## 2024-01-25 PROCEDURE — 97166 OT EVAL MOD COMPLEX 45 MIN: CPT | Mod: GO

## 2024-01-25 PROCEDURE — 250N000013 HC RX MED GY IP 250 OP 250 PS 637: Performed by: STUDENT IN AN ORGANIZED HEALTH CARE EDUCATION/TRAINING PROGRAM

## 2024-01-25 PROCEDURE — 97161 PT EVAL LOW COMPLEX 20 MIN: CPT | Mod: GP

## 2024-01-25 PROCEDURE — 36415 COLL VENOUS BLD VENIPUNCTURE: CPT | Performed by: ORTHOPAEDIC SURGERY

## 2024-01-25 PROCEDURE — 82947 ASSAY GLUCOSE BLOOD QUANT: CPT | Performed by: ORTHOPAEDIC SURGERY

## 2024-01-25 PROCEDURE — 250N000012 HC RX MED GY IP 250 OP 636 PS 637: Performed by: STUDENT IN AN ORGANIZED HEALTH CARE EDUCATION/TRAINING PROGRAM

## 2024-01-25 PROCEDURE — 250N000013 HC RX MED GY IP 250 OP 250 PS 637: Performed by: ORTHOPAEDIC SURGERY

## 2024-01-25 PROCEDURE — 97110 THERAPEUTIC EXERCISES: CPT | Mod: GP

## 2024-01-25 RX ORDER — NALOXONE HYDROCHLORIDE 0.4 MG/ML
0.4 INJECTION, SOLUTION INTRAMUSCULAR; INTRAVENOUS; SUBCUTANEOUS
Status: DISCONTINUED | OUTPATIENT
Start: 2024-01-25 | End: 2024-01-25 | Stop reason: HOSPADM

## 2024-01-25 RX ORDER — AMOXICILLIN 250 MG
1 CAPSULE ORAL 2 TIMES DAILY PRN
Qty: 25 TABLET | Refills: 0 | Status: SHIPPED | OUTPATIENT
Start: 2024-01-25 | End: 2024-04-22

## 2024-01-25 RX ORDER — OXYCODONE HYDROCHLORIDE 5 MG/1
5-10 TABLET ORAL EVERY 4 HOURS PRN
Qty: 25 TABLET | Refills: 0 | Status: SHIPPED | OUTPATIENT
Start: 2024-01-25 | End: 2024-04-22

## 2024-01-25 RX ORDER — ACETAMINOPHEN 325 MG/1
975 TABLET ORAL EVERY 8 HOURS
Start: 2024-01-25

## 2024-01-25 RX ORDER — NALOXONE HYDROCHLORIDE 0.4 MG/ML
0.2 INJECTION, SOLUTION INTRAMUSCULAR; INTRAVENOUS; SUBCUTANEOUS
Status: DISCONTINUED | OUTPATIENT
Start: 2024-01-25 | End: 2024-01-25 | Stop reason: HOSPADM

## 2024-01-25 RX ADMIN — PREDNISONE 3 MG: 2.5 TABLET ORAL at 08:22

## 2024-01-25 RX ADMIN — APIXABAN 5 MG: 5 TABLET, FILM COATED ORAL at 08:24

## 2024-01-25 RX ADMIN — ACETAMINOPHEN 975 MG: 325 TABLET ORAL at 06:24

## 2024-01-25 RX ADMIN — OXYCODONE HYDROCHLORIDE 10 MG: 5 TABLET ORAL at 08:21

## 2024-01-25 RX ADMIN — SENNOSIDES AND DOCUSATE SODIUM 1 TABLET: 8.6; 5 TABLET ORAL at 08:21

## 2024-01-25 RX ADMIN — GABAPENTIN 100 MG: 100 CAPSULE ORAL at 06:24

## 2024-01-25 RX ADMIN — ASPIRIN 81 MG CHEWABLE TABLET 81 MG: 81 TABLET CHEWABLE at 08:21

## 2024-01-25 RX ADMIN — OXYCODONE HYDROCHLORIDE 5 MG: 5 TABLET ORAL at 02:47

## 2024-01-25 RX ADMIN — CEFAZOLIN 1 G: 1 INJECTION, POWDER, FOR SOLUTION INTRAMUSCULAR; INTRAVENOUS at 04:26

## 2024-01-25 RX ADMIN — TORSEMIDE 20 MG: 20 TABLET ORAL at 08:22

## 2024-01-25 RX ADMIN — DILTIAZEM HYDROCHLORIDE 240 MG: 240 CAPSULE, EXTENDED RELEASE ORAL at 08:21

## 2024-01-25 ASSESSMENT — ACTIVITIES OF DAILY LIVING (ADL)
ADLS_ACUITY_SCORE: 29
ADLS_ACUITY_SCORE: 29
ADLS_ACUITY_SCORE: 31
ADLS_ACUITY_SCORE: 28
ADLS_ACUITY_SCORE: 27
ADLS_ACUITY_SCORE: 29

## 2024-01-25 NOTE — PROGRESS NOTES
[facility]    Medicine Progress Note - Hospitalist Service    Date of Admission:  1/24/2024    Assessment & Plan   Amelia Michel is a 63 year old adult admitted on 1/24/2024. She has a past medical history significant for HFpEF, severe TR status post valve replacement in 2023, atrial flutter/fibrillation, NSVT status post PVC, sick sinus syndrome status post pacemaker in 2019, cardiac arrest in 2017, rheumatoid arthritis, diffuse large B-cell lymphoma currently in remission who presented to the hospital for elective left hip arthroplasty.  Hospitalist medicine consulted for management of chronic medical conditions.  No concerns about plans for discharge on 1/25    Heart failure preserved ejection fraction  -At home on torsemide 20 mg daily, spironolactone 12.5 mg daily, Jardiance.  -Appears euvolemic on examination.    Plan  -Continue home torsemide and spironolactone on 1/25  -Continue home Jardiance on discharge    Atrial flutter/fibrillation  Sick sinus syndrome status post pacemaker  -At home on Eliquis 5 mg twice daily, aspirin 81 mg daily  -Diltiazem 240 mg daily      Plan  -Continue home diltiazem and Eliquis on discharge.    Rheumatoid arthritis  -At home on prednisone 50 mg daily, Plaquenil 200 mg daily    Plan  -Continue prednisone and Plaquenil on discharge          Diet: Advance Diet as Tolerated: Regular Diet Adult  Discharge Instruction - Regular Diet Adult    DVT Prophylaxis: Defer to primary service  Huynh Catheter: Not present  Lines: None     Cardiac Monitoring: None  Code Status: Full Code      Clinically Significant Risk Factors Present on Admission               # Drug Induced Coagulation Defect: home medication list includes an anticoagulant medication  # Drug Induced Platelet Defect: home medication list includes an antiplatelet medication   # Hypertension: Noted on problem list  # Chronic heart failure with preserved ejection fraction: heart failure noted on  problem list and last echo with EF >50%          # Pacemaker present       Disposition Plan     Expected Discharge Date: 01/25/2024                    RAMO GALLEGOS MD  Hospitalist Service  [facility]  Securely message with [email or web address] (more info)  Text page via [employer] Paging/Directory   ______________________________________________________________________    Interval History   Feeling well.  She denies any chest pain or shortness of breath.  She denies any lightheadedness.  She denies any nausea or vomiting.    Physical Exam   Vital Signs: Temp: 98.9  F (37.2  C) Temp src: Oral BP: 115/61 Pulse: 75   Resp: 18 SpO2: 97 % O2 Device: None (Room air) Oxygen Delivery: 1 LPM  Weight: 110 lbs 3.68 oz    Physical Exam  Constitutional:       General: Amelia Michel is not in acute distress.     Appearance: Amelia Michel is not toxic-appearing.   Cardiovascular:      Rate and Rhythm: Normal rate.      Heart sounds: Murmur heard.   Skin:     General: Skin is warm and dry.   Neurological:      Mental Status: Amelia Micehl is alert.   Psychiatric:         Mood and Affect: Mood normal.         Thought Content: Thought content normal.         Judgment: Judgment normal.          Medical Decision Making       45 MINUTES SPENT BY ME on the date of service doing chart review, history, exam, documentation & further activities per the note.      Data     I have personally reviewed the following data over the past 24 hrs:    N/A  \   11.1 (L)   / N/A     N/A N/A N/A /  140 (H)   N/A N/A N/A \       Imaging results reviewed over the past 24 hrs:   Recent Results (from the past 24 hour(s))   XR Surgery ADAN  Fluoro G/T 5 Min    Narrative    This exam was marked as non-reportable because it will not be read by a   radiologist or a [place] non-radiologist provider.

## 2024-01-25 NOTE — PROGRESS NOTES
01/25/24 0732   Appointment Info   Signing Clinician's Name / Credentials (OT) Reno Ramesh OTR/L   Quick Adds   Quick Adds Certification   Living Environment   People in Home spouse   Current Living Arrangements house   Transportation Anticipated family or friend will provide   Living Environment Comments Pt has 4WW, walkin shower w/ shower chair and grab bars, ADA toilet, reacher, leg    Self-Care   Usual Activity Tolerance good   Current Activity Tolerance moderate   Equipment Currently Used at Home grab bar, tub/shower;shower chair;walker, rolling   Fall history within last six months yes   Number of times patient has fallen within last six months 1   Activity/Exercise/Self-Care Comment Pt IND w/ most ADLs and IADLs at baseline.  assists w/ donning socks and shoes.   General Information   Onset of Illness/Injury or Date of Surgery 01/24/24   Referring Physician Romeo Quintana MD   Patient/Family Therapy Goal Statement (OT) To go home   Additional Occupational Profile Info/Pertinent History of Current Problem L JEFF   Existing Precautions/Restrictions no hip ER;no hip hyperextension   Left Lower Extremity (Weight-bearing Status) weight-bearing as tolerated (WBAT)   Cognitive Status Examination   Orientation Status orientation to person, place and time   Affect/Mental Status (Cognitive) WNL   Visual Perception   Visual Impairment/Limitations WFL   Sensory   Sensory Comments neuropathy in bilateral hands at baseline   Pain Assessment   Patient Currently in Pain Yes, see Vital Sign flowsheet   Posture   Posture not impaired   Range of Motion Comprehensive   General Range of Motion no range of motion deficits identified   Strength Comprehensive (MMT)   General Manual Muscle Testing (MMT) Assessment no strength deficits identified   Bed Mobility   Bed Mobility supine-sit;sit-supine   Comment (Bed Mobility) SBA   Transfers   Transfers bed-chair transfer;sit-stand transfer;toilet  transfer;shower transfer   Transfer Comments SBA-CGA   Activities of Daily Living   BADL Assessment/Intervention lower body dressing;toileting;bathing   Bathing Assessment/Intervention   [place] Level (Bathing) lower body;minimum assist (75% patient effort)   Lower Body Dressing Assessment/Training   [place] Level (Lower Body Dressing) lower body dressing skills;moderate assist (50% patient effort)   Toileting   [place] Level (Toileting) adjust/manage clothing;contact guard assist   Clinical Impression   Criteria for Skilled Therapeutic Interventions Met (OT) Yes, treatment indicated   OT Diagnosis decreased ADLs   Influenced by the following impairments JEFF   OT Problem List-Impairments impacting ADL activity tolerance impaired;mobility;pain;post-surgical precautions   Assessment of Occupational Performance 3-5 Performance Deficits   Identified Performance Deficits LE dressing/bathing, bed mobility, all transfers, toileting   Planned Therapy Interventions (OT) ADL retraining;bed mobility training;transfer training   Clinical Decision Making Complexity (OT) detailed assessment/moderate complexity   Risk & Benefits of therapy have been explained evaluation/treatment results reviewed;patient   OT Total Evaluation Time   OT Eval, Moderate Complexity Minutes (22379) 10   Therapy Certification   Medical Diagnosis JEFF   Start of Care Date 01/25/24   Certification date from 01/25/24   Certification date to 02/24/24   OT Goals   Therapy Frequency (OT) One time eval and treatment   OT Predicted Duration/Target Date for Goal Attainment 01/25/24   OT Goals Lower Body Dressing;Bed Mobility;Toilet Transfer/Toileting   OT: Lower Body Dressing Minimal assist;within precautions;Goal Met   OT: Bed Mobility Modified independent;supine to/from sitting;rolling;within precautions;Goal Met   OT: Toilet Transfer/Toileting Modified independent;toilet transfer;cleaning and garment management;within precautions;Goal Met    Interventions   Interventions Quick Adds Self-Care/Home Management   Self-Care/Home Management   Self-Care/Home Mgmt/ADL, Compensatory, Meal Prep Minutes (16449) 30   Symptoms Noted During/After Treatment (Meal Preparation/Planning Training) none   Treatment Detail/Skilled Intervention Pt edu on hip px - demonstrated ability to complete ADLs w/in px. Pt instructed on bed mobility techniques - completed Mod I. Pt edu on compensatory strategies for LE dressing - completed w/ Min A to don compression socks and tying shoes;  will assist at home. STS w/ SBA and FWW. Pt amb. 50 ft x2 to BR w/ 4WW Mod I. Edu on toilet transfer w/ grab bars - completed Mod I and toileted Mod I. Pt amb to sink and washed hands.  Pt edu on sleeping position and car transfers - pt verbalized understanding. Discussed DME for home.   OT Discharge Planning   OT Plan DC OT   OT Discharge Recommendation (DC Rec)   (defer to ortho)   OT Rationale for DC Rec Pt tolerating therapy well. Pt has good home setup w/ all necessary DME and  will be home to assist as needed.   OT Brief overview of current status Min A for compression socks/shoes, Mod I w/ other ADLs   Total Session Time   Timed Code Treatment Minutes 30   Total Session Time (sum of timed and untimed services) 40      M [facility]  OUTPATIENT OCCUPATIONAL THERAPY  EVALUATION  PLAN OF TREATMENT FOR OUTPATIENT REHABILITATION  (COMPLETE FOR INITIAL CLAIMS ONLY)  Patient's Last Name, First Name, M.I.  YOB: 1960  Amelia Michel                          Provider's Name  [facility] Medical Record No.  [id number]                             Onset Date:  01/24/24   Start of Care Date:  01/25/24   Type:     ___PT   _X_OT   ___SLP Medical Diagnosis:  JEFF                    OT Diagnosis:  decreased ADLs Visits from SOC:  1     See note for plan of treatment, functional goals and certification details    I  CERTIFY THE NEED FOR THESE SERVICES FURNISHED UNDER        THIS PLAN OF TREATMENT AND WHILE UNDER MY CARE     (Physician co-signature of this document indicates review and certification of the therapy plan).

## 2024-01-25 NOTE — PLAN OF CARE
Problem: Adult Inpatient Plan of Care  Goal: Absence of Hospital-Acquired Illness or Injury  Intervention: Prevent Skin Injury  Recent Flowsheet Documentation  Taken 1/24/2024 2100 by Reg Lindsey RN  Body Position: supine, head elevated   Patient vital signs are at baseline: Yes  Patient able to ambulate as they were prior to admission or with assist devices provided by therapies during their stay:  due to ambulate  Patient MUST void prior to discharge:  Yes  Patient able to tolerate oral intake:  Yes  Pain has adequate pain control using Oral analgesics:  Yes  Does patient have an identified :  Yes  Has goal D/C date and time been discussed with patient:  Yes Goal Outcome Evaluation:      Plan of Care Reviewed With: patient    Overall Patient Progress: improvingOverall Patient Progress: improving remains stable  on 1 liter of oxygen, ambulated and voided, potential discharge tomorrow.

## 2024-01-25 NOTE — PROGRESS NOTES
01/25/24 0900   Appointment Info   Signing Clinician's Name / Credentials (PT) Earlene Grimes, PT, DPT   Quick Adds   Quick Adds Certification   Living Environment   People in Home spouse   Current Living Arrangements house   Home Accessibility stairs to enter home   Number of Stairs, Main Entrance 2   Stair Railings, Main Entrance railings on both sides of stairs   Living Environment Comments one level home   Self-Care   Usual Activity Tolerance good   Current Activity Tolerance moderate   Equipment Currently Used at Home other (see comments)  (4WW)   Fall history within last six months yes   Number of times patient has fallen within last six months 1   General Information   Onset of Illness/Injury or Date of Surgery 01/24/24   Referring Physician Romeo Quintana MD   Patient/Family Therapy Goals Statement (PT) less pain   Pertinent History of Current Problem (include personal factors and/or comorbidities that impact the POC) s/p L JEFF   Existing Precautions/Restrictions no hip ER;no hip hyperextension   Weight-Bearing Status - LLE weight-bearing as tolerated   Weight-Bearing Status - RLE full weight-bearing   Range of Motion (ROM)   ROM Comment decreased ROM s/p L JEFF   Strength (Manual Muscle Testing)   Strength Comments decreased strength s/p L JEFF   Clinical Impression   Criteria for Skilled Therapeutic Intervention Yes, treatment indicated   PT Diagnosis (PT) impaired functional mobility   Influenced by the following impairments pain, decreased ROM, impaired balance, decreased strength   Functional limitations due to impairments gait, stairs, transfers   Clinical Presentation (PT Evaluation Complexity) stable   Clinical Presentation Rationale pt presents as medically diagnosed   Clinical Decision Making (Complexity) low complexity   Planned Therapy Interventions (PT) balance training;bed mobility training;gait training;home exercise program;patient/family education;ROM (range of motion);stair  training;strengthening;transfer training   Risk & Benefits of therapy have been explained care plan/treatment goals reviewed;patient   PT Total Evaluation Time   PT Eval, Low Complexity Minutes (83824) 10   Therapy Certification   Start of care date 01/25/24   Certification date from 01/25/24   Certification date to 02/25/24   Medical Diagnosis JEFF   Physical Therapy Goals   PT Frequency One time eval and treatment only   PT Predicted Duration/Target Date for Goal Attainment 01/25/24   PT Goals PT Goal 1;Transfers;Gait;Stairs   PT: Transfers Modified independent;Sit to/from stand;Assistive device   PT: Gait Modified independent;Rolling walker;150 feet   PT: Stairs 2 stairs;Supervision/stand-by assist;Rail on both sides   PT: Goal 1 Independent with written HEP for LE strengthening and ROM   Interventions   Interventions Quick Adds Therapeutic Procedure;Therapeutic Activity;Gait Training   Therapeutic Procedure/Exercise   Ther. Procedure: strength, endurance, ROM, flexibillity Minutes (55640) 15   Treatment Detail/Skilled Intervention JEFF protocol therex x10 reps, cueing for technique, Independent with written HEP following education   Therapeutic Activity   Treatment Detail/Skilled Intervention sit to/from stand, cueing for safe hand placement and LE positioning, Mod I following education   Gait Training   Gait Training Minutes (66739) 13   Symptoms Noted During/After Treatment (Gait Training) none   Treatment Detail/Skilled Intervention cueing for posture and step through patterning, STAIRS: 2 stairs up/down with bilat rails, cueing  for non-reciprocal patterning, SBA, safe technique   Distance in Feet 220'   [place] Level (Gait Training) independent   Physical Assistance Level (Gait Training) supervision;verbal cues   Weight Bearing (Gait Training) weight-bearing as tolerated   Assistive Device (Gait Training) other (see comments)  (4WW)   Pattern Analysis (Gait Training) swing-through gait   Gait Analysis  Deviations decreased sophia;decreased step length;decreased stride length   PT Discharge Planning   PT Plan d/c PT   PT Discharge Recommendation (DC Rec) other (see comments)   PT Rationale for DC Rec defer to ortho   PT Brief overview of current status 220' with 4WW, Mod I, 2  stairs with bilat rails, SBA   PT Equipment Needed at Discharge cane, straight;walker, rolling   Total Session Time   Timed Code Treatment Minutes 28   Total Session Time (sum of timed and untimed services) 38     M [facility]  OUTPATIENT PHYSICAL THERAPY EVALUATION  PLAN OF TREATMENT FOR OUTPATIENT REHABILITATION  (COMPLETE FOR INITIAL CLAIMS ONLY)  Patient's Last Name, First Name, M.I.  YOB: 1960  MariluAmelia DUNAWAY                        Provider's Name  [facility] Medical Record No.  [id number]                             Onset Date:  01/24/24   Start of Care Date:  01/25/24   Type:     _X_PT   ___OT   ___SLP Medical Diagnosis:  JEFF              PT Diagnosis:  impaired functional mobility Visits from SOC:  1     See note for plan of treatment, functional goals and certification details    I CERTIFY THE NEED FOR THESE SERVICES FURNISHED UNDER        THIS PLAN OF TREATMENT AND WHILE UNDER MY CARE     (Physician co-signature of this document indicates review and certification of the therapy plan).            Physical Therapy Discharge Summary    Reason for therapy discharge:    All goals and outcomes met, no further needs identified.    Progress towards therapy goal(s). See goals on Care Plan in [facility] electronic health record for goal details.  Goals met    Therapy recommendation(s):    Continue home exercise program.

## 2024-01-25 NOTE — PROGRESS NOTES
Nurse teaching given on 1/25/24 and the patient expresses understanding and acceptance of instructions. Ana Molina RN 1/25/2024 12:11 PM

## 2024-01-25 NOTE — PROGRESS NOTES
"[facility]opa[facility]ics Progress Note      Post-operative Day: 1 Day Post-Op    Procedure(s):  LEFT TOTAL HIP ARTHROPLASTY DIRECT ANTERIOR APPROACH ORTHOGRID      Subjective: Patient seen resting in bed, no acute events overnight, did not sleep well due to noise and light. Pain has been tolerable on PO analgesics. Tolerating a regular diet with no nausea or vomiting, voiding without difficulty and passing gas. Feeling ready to discharge home. Denies feeling  light headed or dizziness.     Pain: minimal  Chest pain, SOB:  No      Objective:  Blood pressure 115/61, pulse 75, temperature 98.9  F (37.2  C), temperature source Oral, resp. rate 18, height 1.448 m (4' 9\"), weight 50 kg (110 lb 3.7 oz), SpO2 97%, not currently breastfeeding.    Patient Vitals for the past 24 hrs:   BP Temp Temp src Pulse Resp SpO2 Weight   01/25/24 0731 115/61 98.9  F (37.2  C) Oral 75 18 97 % 50 kg (110 lb 3.7 oz)   01/25/24 0241 (!) 141/69 98  F (36.7  C) Oral 78 18 98 % --   01/24/24 2357 113/63 -- -- 76 -- 97 % --   01/24/24 2100 114/76 98.2  F (36.8  C) Oral 77 18 99 % --   01/24/24 2000 123/70 -- -- 76 18 100 % --   01/24/24 1900 105/69 -- -- 75 -- 93 % --   01/24/24 1800 131/75 -- -- 79 -- 100 % --   01/24/24 1754 131/75 -- -- 76 -- 99 % --   01/24/24 1700 115/67 -- -- 75 -- 100 % --   01/24/24 1630 125/58 -- -- 76 -- 96 % --   01/24/24 1603 127/66 -- -- 83 -- 99 % --   01/24/24 1600 127/66 -- -- 78 -- 98 % --   01/24/24 1530 125/66 -- -- 75 -- 99 % --   01/24/24 1500 119/68 -- -- 76 -- 100 % --   01/24/24 1430 107/66 -- -- 75 -- 97 % --   01/24/24 1400 105/58 -- -- 75 13 98 % --   01/24/24 1350 116/65 -- -- 75 12 97 % --   01/24/24 1340 109/59 -- -- 75 10 99 % --   01/24/24 1330 112/59 -- -- 75 18 97 % --   01/24/24 1319 119/60 97.8  F (36.6  C) Temporal 75 16 99 % --   01/24/24 1313 114/56 97.8  F (36.6  C) Temporal 74 16 99 % --       Wt Readings from Last 4 Encounters:   01/25/24 50 kg (110 lb 3.7 oz)   01/15/24 48.7 kg (107 lb " 6.4 oz)   01/11/24 49.2 kg (108 lb 6.4 oz)   12/22/23 48.2 kg (106 lb 4.8 oz)       General: Alert and orientated, NAD  Respiratory: Non-labored breathing on RA  LLE motor function, sensation, and circulation intact   Yes, moves all other extremities with ease and purpose   Wound status: incisions are clean dry and intact. Yes  Calf tenderness: Bilateral  No    Pertinent Labs   Lab Results: personally reviewed.     Recent Labs   Lab Test 01/25/24  0700 01/24/24  0941 01/15/24  1457 01/11/24  1123 12/22/23  1209 09/21/23  1220 04/11/23  0350 04/10/23  1327 04/10/23  1210 04/10/23  0731 04/10/23  0604 04/27/22  1520 04/20/22  1514 10/13/21  0918 09/16/21  1417 10/14/20  0958 09/17/20  1350   INR  --   --   --   --   --   --   --  1.71* 1.76*  --  1.20*   < >  --   --   --   --   --    WBC  --   --  5.4  --  6.0 5.9   < > 11.0  --    < >  --    < >  --    < > 7.6   < > 6.5   HGB 11.1*  --  15.2  --  14.2 15.5   < > 12.1  --    < >  --    < >  --    < > 14.4   < > 14.2   HCT  --   --  45.4  --  41.6 45.2   < > 35.3  --    < >  --    < >  --    < > 42.0   < > 42.0   MCV  --   --  104*  --  102* 105*   < > 106*  --    < >  --    < >  --    < > 103*   < > 106*   PLT  --   --  139*  --  169 141*   < > 76* 86*   < >  --    < >  --    < > 161   < > 152   NA  --  135  --  137 133*  --    < > 137  --    < >  --    < > 127*   < > 136   < > 139   CRP  --   --   --   --   --   --   --   --   --   --   --   --  20.7*  --  31.9*  --  20.5*    < > = values in this interval not displayed.       Plan:   Anticoagulation protocol: reviewed with Philip TOBIN from Dr. Loredo team, OK to resume PTA Aspirin and Eliquis.   Pain medications:  scopainmedication: oxycodone and tylenol  Weight bearing status:  WBAT  Disposition:  Home pending therapies.    Continue cares and rehabilitation     Report completed by:  ELIZABETH Avitia CNP  Date: 1/25/2024  Time: 11:05 AM

## 2024-01-25 NOTE — PROGRESS NOTES
Patient vital signs are at baseline: Yes  Patient able to ambulate as they were prior to admission or with assist devices provided by therapies during their stay:  Yes  Patient MUST void prior to discharge:  Yes  Patient able to tolerate oral intake:  Yes  Pain has adequate pain control using Oral analgesics:  Yes  Does patient have an identified :  Yes  Has goal D/C date and time been discussed with patient:  Yes, per Dr. Santo and ortho patient ok to discharge, patient transporting to discharge lounge to review discharge orders via wheel chair when able. Received call from [facility] and directed to speak with discharge lounge RN to clarify blood thinners.

## 2024-01-25 NOTE — DISCHARGE SUMMARY
[facility] Discharge Summary                                  [facility]     FIDELIA MIDDLETON [id number]   Age: 63 year old  PCP: Gala Stringer, [phone] 1960     Date of Admission:  1/24/2024  Date of Discharge::  1/25/2024  Discharge Provider:  ELIZABETH Avitia CNP    Code status:  Full Code    Admission Information:  Admission Diagnosis:  Osteoarthritis of left hip [M16.12]  S/P total hip arthroplasty [Z96.649]    Post-Operative Day: 1 Day Post-Op     Reason for admission:  The patient was admitted for the following:Procedure(s) (LRB):  LEFT TOTAL HIP ARTHROPLASTY DIRECT ANTERIOR APPROACH ORTHOGRID (Left)    Principal Problem:    S/P total hip arthroplasty      Allergies:  Blood transfusion related (informational only), Amiodarone, Metoprolol, Penicillins, and Tape [adhesive tape]    Following the procedure noted above the patient was transferred to the post-op floor and started on:    Therapy:  physical therapy and occupational therapy  Anticoagulation Plan:  Resume PTA Aspirin and Eliquis, reviewed with Philip TOBIN from Dr. Loredo team     Pain Management: scopainmedication: oxycodone and tylenol  Weight bearing status: Weight bearing as tolerated     The patient was followed by Orthopedics during the inpatient treatment course:  Complications:  None  Additional consultations:  Hospital Medicine      Pertinent Labs   Lab Results: personally reviewed.     Recent Labs   Lab Test 01/25/24  0700 01/24/24  0941 01/15/24  1457 01/11/24  1123 12/22/23  1209 09/21/23  1220 04/11/23  0350 04/10/23  1327 04/10/23  1210 04/10/23  0731 04/10/23  0604 04/27/22  1520 04/20/22  1514 10/13/21  0918 09/16/21  1417 10/14/20  0958 09/17/20  1350   INR  --   --   --   --   --   --   --  1.71* 1.76*  --  1.20*   < >  --   --   --   --   --    WBC  --   --  5.4  --  6.0 5.9   < > 11.0  --    < >  --    < >  --    < > 7.6   < > 6.5   HGB 11.1*  --  15.2  --  14.2 15.5   < > 12.1  --     < >  --    < >  --    < > 14.4   < > 14.2   HCT  --   --  45.4  --  41.6 45.2   < > 35.3  --    < >  --    < >  --    < > 42.0   < > 42.0   MCV  --   --  104*  --  102* 105*   < > 106*  --    < >  --    < >  --    < > 103*   < > 106*   PLT  --   --  139*  --  169 141*   < > 76* 86*   < >  --    < >  --    < > 161   < > 152   NA  --  135  --  137 133*  --    < > 137  --    < >  --    < > 127*   < > 136   < > 139   CRP  --   --   --   --   --   --   --   --   --   --   --   --  20.7*  --  31.9*  --  20.5*    < > = values in this interval not displayed.          Discharge Information:  Condition at discharge: Stable  Discharge destination:  Discharged to home     Medications at discharge:  Current Discharge Medication List        START taking these medications    Details   oxyCODONE (ROXICODONE) 5 MG tablet Take 1-2 tablets (5-10 mg) by mouth every 4 hours as needed for moderate pain  Qty: 25 tablet, Refills: 0    Associated Diagnoses: Status post total replacement of hip, unspecified laterality      senna-docusate (SENOKOT-S/PERICOLACE) 8.6-50 MG tablet Take 1 tablet by mouth 2 times daily as needed for constipation  Qty: 25 tablet, Refills: 0    Associated Diagnoses: Status post total replacement of hip, unspecified laterality           CONTINUE these medications which have CHANGED    Details   acetaminophen (TYLENOL) 325 MG tablet Take 3 tablets (975 mg) by mouth every 8 hours    Associated Diagnoses: Status post total replacement of hip, unspecified laterality           CONTINUE these medications which have NOT CHANGED    Details   alendronate (FOSAMAX) 70 MG tablet Take 1 tablet (70 mg) by mouth every 7 days  Qty: 12 tablet, Refills: 3    Associated Diagnoses: Steroid-induced osteoporosis      aspirin (ASA) 81 MG chewable tablet 1 tablet (81 mg) by Oral or NG Tube route daily  Qty: 30 tablet, Refills: 2    Associated Diagnoses: Severe tricuspid regurgitation; S/P TVR (tricuspid valve replacement)      calcium  carbonate 600 mg-vitamin D 400 units (CALTRATE) 600-400 MG-UNIT per tablet Take 2 tablets by mouth daily      diltiazem ER (DILT-XR) 240 MG 24 hr ER beaded capsule Take 1 capsule (240 mg) by mouth daily  Qty: 90 capsule, Refills: 3    Associated Diagnoses: Atrial tachycardia      docusate sodium (COLACE) 100 MG capsule Take 100 mg by mouth 2 times daily as needed for constipation      empagliflozin (JARDIANCE) 10 MG TABS tablet Take 1 tablet (10 mg) by mouth daily  Qty: 90 tablet, Refills: 3    Associated Diagnoses: Acute on chronic diastolic congestive heart failure (H)      enoxaparin ANTICOAGULANT (LOVENOX) 40 MG/0.4ML syringe Inject 0.4 mLs (40 mg) Subcutaneous 2 times daily  Qty: 2.4 mL, Refills: 0    Associated Diagnoses: Paroxysmal atrial fibrillation (H)      !! gabapentin (NEURONTIN) 100 MG capsule Take 100 mg by mouth 2 times daily AM and Afternoon      !! gabapentin (NEURONTIN) 100 MG capsule Take 200 mg by mouth at bedtime      hydroxychloroquine (PLAQUENIL) 200 MG tablet Take 1 tablet (200 mg) by mouth daily  Qty: 90 tablet, Refills: 1    Associated Diagnoses: Rheumatoid arthritis involving multiple sites with positive rheumatoid factor (H); Steroid-induced osteoporosis      loratadine (CLARITIN) 10 MG tablet Take 10 mg by mouth daily as needed      magnesium oxide 200 MG TABS Take 200 mg by mouth daily bedtime      potassium chloride ER (KLOR-CON M) 20 MEQ CR tablet Take 3 tablets (60 mEq) by mouth daily  Qty: 270 tablet, Refills: 3    Comments: Dose increase  Associated Diagnoses: Hypokalemia; Heart failure with preserved ejection fraction, NYHA class I (H)      predniSONE (DELTASONE) 1 MG tablet Take 3 tablets (3 mg) by mouth daily - Oral  Qty: 270 tablet, Refills: 1    Associated Diagnoses: Rheumatoid arthritis involving multiple sites with positive rheumatoid factor (H)      spironolactone (ALDACTONE) 25 MG tablet Take 0.5 tablets (12.5 mg) by mouth daily  Qty: 90 tablet, Refills: 1    Associated  Diagnoses: Heart failure with preserved ejection fraction, NYHA class I (H)      torsemide (DEMADEX) 20 MG tablet Take 1 tablet (20 mg) by mouth daily  Qty: 90 tablet, Refills: 3    Associated Diagnoses: Chronic systolic heart failure (H)      Vitamin D (Cholecalciferol) 10 MCG (400 UNIT) TABS Take 1 tablet by mouth daily      apixaban ANTICOAGULANT (ELIQUIS ANTICOAGULANT) 5 MG tablet Take 1 tablet (5 mg) by mouth 2 times daily  Qty: 180 tablet, Refills: 3    Associated Diagnoses: Paroxysmal atrial fibrillation (H)      multivitamin, therapeutic with minerals (THERA-VIT-M) TABS tablet Take 1 tablet by mouth daily  Qty: 30 each, Refills: 0    Associated Diagnoses: Diffuse large B-cell lymphoma, unspecified body region (H)       !! - Potential duplicate medications found. Please discuss with provider.                     Follow-Up Care:  Patient should be seen in the office in 14 days by the Orthopedic Surgeon/Physician Assistant.  Call [phone] for appointment or questions.    It was my pleasure to take care of the above patient.  ELIZABETH Avitia CNP

## 2024-01-25 NOTE — PLAN OF CARE
"  Problem: Hip Arthroplasty  Goal: Optimal Functional Ability  Outcome: Progressing  Intervention: Promote Optimal Functional Status  Recent Flowsheet Documentation  Taken 1/25/2024 0515 by Ernestina Lyons, RN  Assistive Device Utilized:   walker   gait belt  Activity Management:   activity adjusted per tolerance   ambulated outside room   ambulated to bathroom   back to bed  Taken 1/25/2024 0247 by Ernestina Lyons, RN  Assistive Device Utilized:   walker   gait belt  Activity Management:   activity adjusted per tolerance   ambulated outside room   back to bed   up to bedside commode     Problem: Hip Arthroplasty  Goal: Acceptable Pain Control  Outcome: Progressing  Intervention: Prevent or Manage Pain  Recent Flowsheet Documentation  Taken 1/25/2024 0247 by Ernestina Lyons, RN  Pain Management Interventions: medication (see MAR)     Goal Outcome Evaluation:  BP (!) 141/69 (BP Location: Right arm, Patient Position: Semi-Henry's, Cuff Size: Adult Small)   Pulse 78   Temp 98  F (36.7  C) (Oral)   Resp 18   Ht 1.448 m (4' 9\")   Wt 49.5 kg (109 lb 3.2 oz)   SpO2 98%   BMI 23.63 kg/m    Pt is a/ox4, calm and cooperative. Pt rated pain 5/10, managed with MAR. Pt ambulated to the outside room to the bathroom and back to bed without complications and with standby assist.  Incision: clean, dry, intact, wrapped with ace elastic bandage.  Neuro: intact  PIV, SL                        "

## 2024-02-02 ENCOUNTER — OFFICE VISIT (OUTPATIENT)
Dept: CARDIOLOGY | Facility: CLINIC | Age: 64
End: 2024-02-02
Attending: NURSE PRACTITIONER
Payer: COMMERCIAL

## 2024-02-02 ENCOUNTER — ANCILLARY PROCEDURE (OUTPATIENT)
Dept: CARDIOLOGY | Facility: CLINIC | Age: 64
End: 2024-02-02
Attending: INTERNAL MEDICINE
Payer: COMMERCIAL

## 2024-02-02 VITALS
HEIGHT: 58 IN | HEART RATE: 73 BPM | DIASTOLIC BLOOD PRESSURE: 73 MMHG | WEIGHT: 106.3 LBS | OXYGEN SATURATION: 95 % | SYSTOLIC BLOOD PRESSURE: 122 MMHG | BODY MASS INDEX: 22.31 KG/M2

## 2024-02-02 DIAGNOSIS — I07.1 SEVERE TRICUSPID REGURGITATION: ICD-10-CM

## 2024-02-02 DIAGNOSIS — Q21.0 VSD (VENTRICULAR SEPTAL DEFECT): ICD-10-CM

## 2024-02-02 DIAGNOSIS — I50.22 CHRONIC SYSTOLIC HEART FAILURE (H): ICD-10-CM

## 2024-02-02 DIAGNOSIS — I47.19 ATRIAL TACHYCARDIA (H): ICD-10-CM

## 2024-02-02 DIAGNOSIS — I48.0 PAROXYSMAL ATRIAL FIBRILLATION (H): ICD-10-CM

## 2024-02-02 DIAGNOSIS — R00.1 BRADYCARDIA: ICD-10-CM

## 2024-02-02 PROCEDURE — 93280 PM DEVICE PROGR EVAL DUAL: CPT | Performed by: INTERNAL MEDICINE

## 2024-02-02 PROCEDURE — 99213 OFFICE O/P EST LOW 20 MIN: CPT | Mod: 25 | Performed by: NURSE PRACTITIONER

## 2024-02-02 PROCEDURE — 99213 OFFICE O/P EST LOW 20 MIN: CPT | Performed by: NURSE PRACTITIONER

## 2024-02-02 ASSESSMENT — PAIN SCALES - GENERAL: PAINLEVEL: NO PAIN (0)

## 2024-02-02 NOTE — LETTER
2/2/2024      RE: Amelia Michel  [address]  [facility] [id number]       Dear Colleague,    Thank you for the opportunity to participate in the care of your patient, Amelia Michel, at the [facility] [place] at [facility]. Please see a copy of my visit note below.    Electrophysiology Clinic Note  HPI:   Ms. Michel is a 63 year old female who has a past medical history significant for VSD s/p repair 1969, RA, HTN, insomnia, atrial tachyarrhythmias, cardiac arrest May 2017, SSS s/p PPM 12/2019,  DLBCL, VT s/p VT ablation 12/1/2017and exudative pericardial effusion, and severe TR s/p TVT 4/10/23.      She was admitted from 5/15/17-5/23/17 with atrial tachyarrhythmias (SVT, AF, AFL) with symptoms of palpitations, fatigue, and nausea. An echo showed LVEF 40-45%, mildly reduced RVEF, moderate biatrial enlargement, mild mitral regurgitation, and moderate tricuspid regurgitation. . She was started on Eliquis and underwent a BRE/DCCV on 5/17/17 c/b hypotension and recurrent arrhythmia.She was started on Sotalol and chemically cardioverted. However, she had nausea and thought it was from the Sotalol so this was stopped. She reverted back to AF/AFL and a rate control strategy was adopted which was difficult given symptoms so she started amiodarone. CMRI  showed LVEF 55%, mildly dilated RV with focal area of dyskinesis in RVOT, severe bi-atrial enlargement, severe TR, mild/moderate MR, and DE at RV septal insertion site. RFA was discussed but was defered as patient had a UTI at the time with ESBL.     She was readmitted on 6/19/17-8/4/17 after suffering an out of hospital cardiac arrest.  Upon EMS arrival, she was in VF. She was externally defibrillated and had ROSC in the field. She was intubated and brought to [facility] found to be in escape rhythm 43 bpm. She was taken emergently to cath lab where coronary angiogram showed normal coronary arteries.  "Temporary pacemaker was placed in RA given sinus arrest. She was also placed on therapeutic hypothermia. She had been having nausea, diarrhea, and intermittent vomiting over the last week and generally had not been feeling well over the last month and also had saddle anesthesia with lower extremity numbness that had been evaluated by neuro as an outpatient.  Ultimately found to have DLBCL started on chemo cycles. 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     Her DLBCL was treated with R-EPOCH for one cycle while in the hospital complicated by cytopenias followed by 5 cycles of R-CHOP finished the cycles in December of 2017 currently on surveillance scans. She had a pericardial effusion for which she is on colchicine which was discontinued at the end of 6/2018.      She has then followed intermittently with EP in clinic. She had some recurrent AF in 4/2018 requiring ED visit with DCCV with recurrence and another DCCV. We restarted digoxin 4/4/18 after which she had one episode of AF s/p DCCV and has since maintained sinus rhythm and reported good symptomatic control. She was seen in 5/2018 and reported having a feeling of her heart \"rolling\" daily lasting about about 10 sec at the most. The last time she required DCCV was in 4/12/2018. Her cMRI had shown some organization of her [ericardial effusion) and she has been initiated on colchicine. Chest CT in 9/2018 showed no evidence of lymphoma recurrence and improved pericardial effusion. Last EP follow up was in 2/2019 and she was doing well without issues.    She then presented 12/12/2019 with 1 day history palpitations, headaches, and nausea. She also endorses some dizziness when walking but not at rest. She was found to be bradycardic with intermittent junctional rhythm into 30's. Electrolytes and renal function stable. Her atenolol and digoxin were held. " She continued to have junctional/bradycardia and decision was made to pursue PPM.    EP Visit 4/10/20: She presents today for follow up. She reports feeling well. She states she does have intermittent episodes of palpitations, that have not been overly bothersome to her. Device site well healed. She denies any chest pain/pressures, dizziness, lightheadedness, worsening shortness of breath, leg/ankle swelling, PND, orthopnea, or syncopal symptoms. Device site is reported well healed. Device transmission shows presenting rhythm is AP/VS @ 60 bpm. 1 NSVT, 6 Fast A&V and 272 AF episodes recorded. All EGMs show AF/AT with RVR. AF burden= 75.3%. She is taking eliquis. Normal device function. AP= 26% and = 13%. No short V-V intervals recorded. Lead trends appear stable. Battery estimates 14.2 years to OZZY. Current cardiac medications include: Elqiuis, Atenolol, Digoxin, Lasix, and Spironolactone.     EP Visit 10/9/20: She presents today for follow up. She reports feeling well. She denies any chest pain/pressures, dizziness, lightheadedness, worsening shortness of breath, leg/ankle swelling, PND, orthopnea, palpitations, or syncopal symptoms. Device interrogation shows normal pacemaker function. 2 Fast A&V and 1 NSVT episodes recorded since last remote transmission. Intrinsic rhythm = Atrial Flutter, w/ VS @ 88 bpm. AF burden = 85.9%. Pt is on Eliquis. AP = 16.7%.  = 23.6%.  Estimated battery longevity to OZZY = 13.6 years. No short v-v intervals recorded. Lead trends appear stable. Presenting 12 lead ECG shows AF/AFL and  Vent Rate 69 bpm,  ms, QTc 473 ms. Echo shows LVEF 60-65%, mildly dilated RV with normal function, moderate TR, unchanged from prior echo. Current cardiac medications include: Elqiuis, Atenolol, Digoxin, Lasix, and Spironolactone.     EP visit 8/25/21: She presents today for follow up. She had some more RVR and her digoxin was resumed. She has been having ongoing fatigue which is now improved  "with better rate control. She denies any chest pain/pressures, dizziness, lightheadedness, worsening shortness of breath, leg/ankle swelling, PND, orthopnea, palpitations, or syncopal symptoms. Device interrogations shows normal device function, stable lead parameters, intrinsic is AFL/VS/ at 60 bpm, HR histograms adequate, 100% AF/AFL since 11/2020, AP <0.1%,  49.3%. Echo today shows normal LVEF 60-65%, mildly decreased RV function, moderate TI, and dilated IVC. Current cardiac medications include: Elqiuis, Atenolol, Digoxin, Lasix, and Spironolactone. SHe was asymptomatic with her AFL with preserved EF, hence we continued conservative management.    EP visit 8/22/22: She presents today for follow-up. Patient feels she is \"in failure\" because she feels bloated and liver under tension. TTE done today shows a normal EF, dilated IVC with mod to sev TR and some pulmonary HTN, severe biatrial enlargement. Creatinine 1.09 7/20/22, weight has not increased much but she feels like it did  Otherwise no bleeding issues with Eliquis.   Her device interrogation today shows that she is V pacing 77% of the time, had runs been as nonsustained VT but available EGM suggest atrial fibrillation with RVR, battery life 11 years.  Her EKG today shows a baseline of atrial fibrillation with ventricular pacing at 68 bpm.AT that visit her lasix was adjusted temporarly.    EP Visit 2/1/23: She presents today for follow-up after she was admitted to the hospital in December 2023 for heart failure exacerbation and RVR from her chronic AF. Her diuretics were optimized She is feeling better since her discharge.  She had runs of VT in the hospital and underwent a VT ablation on 12/1/22. She is not feeling any of the symptoms she was having during the VT runs in the hospital. She has had no recurrences of VT. While in the hospital, she met with Dr Walker in the hospital who wants to keep her HR more permissive. Today her HR is at 92 bpm at " rest.NSVT runs on device are more likely AF with RVR. No bleeding issues with Eliquis.    EP Visit 8/8/23: She presents today for follow up. She underwent TVR on 4/10/23. She is recovering well post operatively. She reports feeling well overall. She is asymptomatic with her SVT and NSVT. She denies chest discomfort, palpitations, abdominal fullness/bloating or peripheral edema, shortness of breath, paroxysmal nocturnal dyspnea, orthopnea, lightheadedness, dizziness, pre-syncope, or syncope. A zio patch monitor from 6/30/23-7/7/23 showed 6 NSVT up to 7.3 seconds and 1575 PSVT up to 2 minutes and 3 seconds with no symptoms reported. Device interrogation shows normal device function, stable lead parameters, 33.7% AF burden, 412 NSVT some of the episodes have RR irregularity noted, and AP 49.4%,  60.7%. Current cardiac medications include: Diltiazem, Spironolactone, ASA, and Eliquis.     She presents today for follow up. She reports feeling well. She had a partial hip fracture and required JEFF recently. She is recovering well after this. She denies chest discomfort, palpitations, abdominal fullness/bloating or peripheral edema, shortness of breath, paroxysmal nocturnal dyspnea, orthopnea, lightheadedness, dizziness, pre-syncope, or syncope. A zio patch monitor from 1/2024 showed 2 NSVTs up to 29 beats and 4.8% PVC burden. Device interrogation shows normal device function, stable lead parameters, 3.9% AF burden, 30 NSVT up to 2 seconds, and AP 85.3%,  97.9%. Current cardiac medications include: Diltiazem, Spironolactone, ASA, and Eliquis.       PAST MEDICAL HISTORY:  Past Medical History:   Diagnosis Date     Antiplatelet or antithrombotic long-term use      Arrhythmia      Arthritis      Atrial fibrillation (H)      Atrial flutter (H)      Cancer (H) June 2017    DLBCL currently in remission     Cardiac arrest (H) 2017     Chronic kidney disease      Congestive heart failure (H)      Diffuse large B-cell lymphoma of  extranodal site (H) 2017     Extremity restricted from procedure     Left arm     Fall 12/06/2023    Cut on head     H/O blood transfusion reaction     Hives with platelet transfusion, needs pre-medication with tylenol and benadryl     Heart murmur      History of blood clots 2017    PICC line Right arm      History of chemotherapy      History of transfusion      Hypertension      Lymphedema of both lower extremities     wears lymphedema socks     Neuropathy     Feet and hands     NSVT (nonsustained ventricular tachycardia) (H) 12/2022     Pacemaker 2019     Pericardial effusion 2017     Physical deconditioning      PONV (postoperative nausea and vomiting)      Positive PPD, 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     Prediabetes      Pulmonary hypertension (H)      RA (rheumatoid arthritis) (H)      SSS (sick sinus syndrome) (H) 2019    Pacemaker     Steroid-induced osteoporosis      Thrombocytopenia (H24)      VSD (ventricular septal defect)        CURRENT MEDICATIONS:  Current Outpatient Medications   Medication Sig Dispense Refill     acetaminophen (TYLENOL) 325 MG tablet Take 3 tablets (975 mg) by mouth every 8 hours       alendronate (FOSAMAX) 70 MG tablet Take 1 tablet (70 mg) by mouth every 7 days (Patient taking differently: Take 70 mg by mouth every 7 days Mondays) 12 tablet 3     apixaban ANTICOAGULANT (ELIQUIS ANTICOAGULANT) 5 MG tablet Take 1 tablet (5 mg) by mouth 2 times daily 180 tablet 3     aspirin (ASA) 81 MG chewable tablet 1 tablet (81 mg) by Oral or NG Tube route daily 30 tablet 2     calcium carbonate 600 mg-vitamin D 400 units (CALTRATE) 600-400 MG-UNIT per tablet Take 2 tablets by mouth daily       diltiazem ER (DILT-XR) 240 MG 24 hr ER beaded capsule Take 1 capsule (240 mg) by mouth daily 90 capsule 3     docusate sodium (COLACE) 100 MG capsule Take 100 mg by mouth 2 times daily as needed for constipation       empagliflozin (JARDIANCE) 10 MG TABS tablet Take 1 tablet (10 mg) by mouth daily (Patient taking  differently: Take 10 mg by mouth daily Stopping 1/20/24 before surgery) 90 tablet 3     gabapentin (NEURONTIN) 100 MG capsule Take 100 mg by mouth 2 times daily AM and Afternoon       gabapentin (NEURONTIN) 100 MG capsule Take 200 mg by mouth at bedtime       hydroxychloroquine (PLAQUENIL) 200 MG tablet Take 1 tablet (200 mg) by mouth daily 90 tablet 1     loratadine (CLARITIN) 10 MG tablet Take 10 mg by mouth daily as needed       magnesium oxide 200 MG TABS Take 200 mg by mouth daily bedtime       multivitamin, therapeutic with minerals (THERA-VIT-M) TABS tablet Take 1 tablet by mouth daily (Patient taking differently: Take 1 tablet by mouth daily Stopping 1/17/24 before surgery) 30 each 0     oxyCODONE (ROXICODONE) 5 MG tablet Take 1-2 tablets (5-10 mg) by mouth every 4 hours as needed for moderate pain 25 tablet 0     potassium chloride ER (KLOR-CON M) 20 MEQ CR tablet Take 3 tablets (60 mEq) by mouth daily 270 tablet 3     predniSONE (DELTASONE) 1 MG tablet Take 3 tablets (3 mg) by mouth daily - Oral 270 tablet 1     senna-docusate (SENOKOT-S/PERICOLACE) 8.6-50 MG tablet Take 1 tablet by mouth 2 times daily as needed for constipation 25 tablet 0     spironolactone (ALDACTONE) 25 MG tablet Take 0.5 tablets (12.5 mg) by mouth daily 90 tablet 1     torsemide (DEMADEX) 20 MG tablet Take 1 tablet (20 mg) by mouth daily 90 tablet 3     Vitamin D (Cholecalciferol) 10 MCG (400 UNIT) TABS Take 1 tablet by mouth daily         PAST SURGICAL HISTORY:  Past Surgical History:   Procedure Laterality Date     ANESTHESIA CARDIOVERSION N/A 05/17/2017    Procedure: ANESTHESIA CARDIOVERSION;  ANESTHESIA CARDIOVERSION;  Surgeon: GENERIC ANESTHESIA PROVIDER;  Location: [facility] OR     ANESTHESIA CARDIOVERSION  03/12/2018    Procedure: ANESTHESIA CARDIOVERSION;;  Surgeon: GENERIC ANESTHESIA PROVIDER;  Location: [facility] OR     ANESTHESIA CARDIOVERSION N/A 03/23/2018    Procedure: ANESTHESIA CARDIOVERSION;  Anesthesia Cardioversion ;  Surgeon:  GENERIC ANESTHESIA PROVIDER;  Location: [facility] OR     ANESTHESIA CARDIOVERSION N/A 04/12/2018    Procedure: ANESTHESIA CARDIOVERSION;  Cardioversion;  Surgeon: GENERIC ANESTHESIA PROVIDER;  Location:  OR     ARTHRODESIS WRIST  2000    Right wrist     BONE MARROW ASPIRATE &BIOPSY  07/12/2017          BONE MARROW BIOPSY W/ ASPIRATION  07/12/2017     CARDIOVERSION      5/17/17, 3/12/18, 3/23/18, 4/14/18,      COLONOSCOPY N/A 11/19/2019    Procedure: Colonoscopy, With Polypectomy And Biopsy;  Surgeon: Pj Vasques MD;  Location: [facility]     CV CORONARY ANGIOGRAM N/A 11/28/2022    Procedure: Coronary Angiogram;  Surgeon: Sundar Wood MD;  Location:  HEART CARDIAC CATH LAB     CV RIGHT HEART CATH MEASUREMENTS RECORDED N/A 11/28/2022    Procedure: Right Heart Catheterization;  Surgeon: Sundar Wood MD;  Location:  HEART CARDIAC CATH LAB     EP ABLATION PVC N/A 12/01/2022    Procedure: Ablation Premature Ventricular Contractions;  Surgeon: Juan Eaton MD;  Location:  HEART CARDIAC CATH LAB     EP PACEMAKER N/A 12/13/2019    Procedure: EP Pacemaker;  Surgeon: Pj Trent MD;  Location:  HEART CARDIAC CATH LAB     EXCISE LESION UPPER EXTREMITY Left 05/22/2018    Procedure: EXCISE LESION UPPER EXTREMITY;  Left Arm Wound Excision And Closure, Possible Submuscular Transposition of Ulnar Nerve  (Choice Anesthesia);  Surgeon: Kevin Sheldon MD;  Location:  OR     FOOT SURGERY      4 left and 2 right     FOOT SURGERY      4 Left and 2 Right      IMPLANT PACEMAKER  12/13/2019    Dr China Trent  [facility]      IMPLANT PACEMAKER       RELEASE CARPAL TUNNEL Bilateral      REPAIR VALVE TRICUSPID MINIMALLY INVASIVE N/A 04/10/2023    Procedure: MINIMALLY INVASIVE TRICUSPID VALVE REPLACEMENT USING 29MM [facility] [place] EPIC VALVE, FEMORAL CANNULATION AND RIGHT GROIN CUTDOWN, CARDIOPULMONARY BYPASS, TRANSESOPHAGEAL ECHOCARDIOGRAM PERFORMED BY ANESTHESIA STAFF;  Surgeon: Chilango Cope  MD Misael;  Location: [facility] OR     REPAIR VENTRICULAR SEPTAL DEFECT  1969     TOTAL HIP ARTHROPLASTY Left 1/24/2024    Procedure: LEFT TOTAL HIP ARTHROPLASTY DIRECT ANTERIOR APPROACH ORTHOGRID;  Surgeon: Romeo Quintana MD;  [facility] Main OR     TRANSPOSITION ULNAR NERVE (ELBOW) Left 05/22/2018    Procedure: TRANSPOSITION ULNAR NERVE (ELBOW);;  Surgeon: Kevin Sheldon MD;  Location: [facility] OR     VSD REPAIR  1969     ZZC FUSION FOOT BONES,SUBTALAR Right 10/20/2020    Procedure: RIGHT SUBTALAR JOINT FUSION AND CALCANEOCUBOID FUSION;  Surgeon: Nemesio Crow MD;  Location: [facility] Main OR;  Service: Orthopedics       ALLERGIES:     Allergies   Allergen Reactions     Blood Transfusion Related (Informational Only) Hives     Hives with platelets. Give benadryl premedication.     Amiodarone Other (See Comments) and Difficulty breathing     Lethargic and had trouble breathing- occurred in 2017     Metoprolol Other (See Comments)     Pt and  report that metoprolol does not work for her and also reports feeling unwell with this medication. She has been able to tolerate atenolol, which as worked in controlling her HR.      Penicillins Other (See Comments)     Childhood reaction, concern for tongue swelling     Tape [Adhesive Tape] Rash       FAMILY HISTORY:  Family History   Problem Relation Age of Onset     Breast Cancer Mother         60s     Hypertension Mother      Alzheimer Disease Mother      Cerebrovascular Disease Mother      Hypertension Father      Cerebrovascular Disease Father      Atrial fibrillation Father      Lymphoma Father      Prostate Cancer Father      Other Cancer Father         some form of Lymphoma     Alzheimer Disease Maternal Grandmother         likely     Arthritis Maternal Grandfather      Pneumonia Maternal Grandfather      Diabetes Paternal Grandmother      Mental Illness Sister         early onset  alzheimers       SOCIAL HISTORY:  Social History     Tobacco Use      "Smoking status: Never     Smokeless tobacco: Never   Vaping Use     Vaping Use: Never used   Substance Use Topics     Alcohol use: Yes     Comment: 2 drinks per week     Drug use: Never         Exam:  /73 (BP Location: Right arm, Patient Position: Sitting, Cuff Size: Adult Regular)   Pulse 73   Ht 1.48 m (4' 10.27\")   Wt 48.2 kg (106 lb 4.8 oz)   SpO2 95%   BMI 22.01 kg/m    CONSITUTIONAL: no acute distress  HEENT: no icterus, no redness or discharge, neck supple  CV: no visible edema of visualized extremities.  RESPIRATORY: respirations nonlabored, no cough  NEURO: AA&Ox3, speech fluent/appropriate, motor grossly nonfocal  PSYCH: cooperative, affect appropriate  DERM: no rashes on visualized face/neck/upper extremities        Testing/Procedures:  9/11/17 ECHOCARDIOGRAM:   Interpretation Summary  Left ventricular function, chamber size, wall motion, and wall thickness are  normal.The EF is 60-65% (traced 60%.) GLS -18%.  The right ventricle is normal size and normal in function. The RV is mildly dilated.  Moderate tricuspid insufficiency is present.  Mild pulmonary hypertension is present (esimated PASP 55 mmHg including RA pressure.)  Dilation of the inferior vena cava is present with abnormal respiratory  variation in diameter (esitimated RAP 15 mmHg.)  This study was compared with the study from 8/31/17: TR appears slightly decreased from the earlier study.     7/24/17 CMRI:     1. The LV is normal in cavity size and wall thickness. The global systolic function is normal. The LVEF is  64%. There are no regional wall motion abnormalities. No evidence of myocardial iron overload.   2. The RV is normal in cavity size. The global systolic function is normal. The RVEF is 59%.   3. There is moderate dilation of bilateral atria.   4. Tricuspid regurgitation is present, difficult to quantify due to image quality.   5. Late gadolinium enhancement imaging shows RV insertion site hyperenhancement, a non-specific " finding  seen in patients with RV volume/pressure overload.   6. There is a moderate right pleural effusion and small left pleural effusion.    CONCLUSIONS: Normal Bi-Ventricular systolic function, LVEF 64% and RVE 59%. There is no evidence of  infiltrative disease. Compared to the MRI from 5/15/2017, there is no significant change.      7/2017 BRE:  Interpretation Summary  There is a small mass (0.7 cm by 0.2 cm) on the ventricular side of the right  coronary cusp. There is a second mass (0.4 cm by 0.2 cm) observed in the LVOT,  attached to the mitro-aortic curtain. There is a third mass on the noncoronary  cusp that measures 0.4 cm by 0.2 cm that could be a healing vegetation. These  findings are compatible with endocarditis if clinical picture supports.  Right ventricular function, chamber size, wall motion, and thickness are  normal.  This study was compared with the study from 7/10/2017.  There is detection of masses on the aortic valve and LVOT.     4/12/18 CMRI:  1. The LV is normal in cavity size and wall thickness. The global systolic function is normal. The LVEF is  54%. There are no regional wall motion abnormalities.  2. The RV is mildly dilated in cavity size. The global systolic function is normal. The RVEF is 49%.   3. Both atria are severely enlarged.  4. There is at least moderate, possibly severe tricuspid regurgitation.   5. Late gadolinium enhancement imaging shows hyperenhancement in the RV insertion site consistent with RV  pressure/volume overload.   6. There is a loculated pericardial effusion along the basal lateral and basal to mid inferior LV walls,  and along the inferior RV wall. it appears exudative in nature, and is beginning to organize. There is  diffuse pericardial enhancement.  7. There is no intracardiac thrombus or mass.  8. There is a small right pleural effusion.    9/26/18 CHEST CT:                                                                   IMPRESSION: In this patient  with a history of lymphoma:  1. No evidence of disease recurrence, consistent with complete  response per Lugano criteria.  2. Stable cardiomegaly. Improved pericardial effusion.  3. Early contrast phase with heterogeneous visceral enhancement in the  abdomen, likely secondary to cardiac dysfunction.    10/9/20 ECHOCARDIOGRAM:  Interpretation Summary  VSD s/p repair in 1969  Global and regional left ventricular function is normal with an EF of 60-65%.  No flow acceleration is seen across the septum.  Global right ventricular function is normal. Mild right ventricular dilation  is present.  There is moderate tricuspid regurgitation.  The estimated PA systolic pressure is 32 mmHg but this is likely to be  underestimated due to the presence of multiple jets.  This study was compared with the study from 09/11/2017. There has been no  change in the severity of the tricuspid regurgitation or the RV size/function.  8/2021 ECHOCARDIOGRAM:  terpretation Summary  H/O of VSD repair in 1969  The visual ejection fraction is 60-65%. No wall motion abnormalities. Global  right ventricular function is mildly reduced.  Moderate tricuspid insufficiency is present.  There is a right ventricular right atrial pressure difference of 24mmHg.  IVC diameter >2.1 cm collapsing <50% with sniff suggests a high RA pressure  estimated at 15 mmHg or greater.  No pericardial effusion is present.    TTE 8/26/22:  Interpretation Summary  Left ventricular function, chamber size, wall motion, and thickness are normal.  Severe biatrial enlargement is present.  Moderate to severe tricuspid insufficiency is present.  Dilation of the inferior vena cava is present with abnormal respiratory  variation in diameter.  No pericardial effusion is present.    Assessment and Plan:   Ms. Michel is a 63 year old female who has a past medical history significant for VSD s/p repair 1969, RA, HTN, insomnia, atrial tachyarrhythmias, cardiac arrest May 2017, SSS s/p PPM  12/2019,  DLBCL, VT s/p VT ablation 12/1/2017and exudative pericardial effusion, and severe TR s/p TVT 4/10/23.     RV VT, focal:  S/p Vt ablation on 12/1/2022. No recurrences of sustained VT.  Does have short runs of NSVT, overall not symptomatic to her and not high burden at this time.    Sick Sinus Syndrome:   Atrial Fibrillation:  1. Stroke Prophylaxis:  CHADSVASC=+HTN, +gender  2, corresponding to a 2.2% annual stroke / systemic emolism event rate. indicating need for long term oral anticoagulation.  She is on Eliquis. No bleeding issues.   2. Rate Control: Continue diltiazem, increased to 240 CD daily.  3. Rhythm Control: Cardioversion, Antiarrhythmics and/or ablation are options for rhythm control. She has had several DCCV last in 4/12/19. Notably, she had possible side effects from Sotalol (however, likely was due to underlying illness at the time and not from medication since she was found to have DBCL).  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  However post TVR has been having SR with 33% AF burden on device.  No intervention needed at this time per patient preference and asymptomatic.  4. Had tachy-shelton syndrome and underwent PPM implant in 12/2019. Normal device function with stable lead parameters. She will have continued follow up with Device clinic per routine.     Follow-up in 1 year or sooner if need arises.     The patient states understanding and is agreeable with plan.   ELIZABETH Verde CNP  Electrophysiology Consult Service  Securely message with [employer]   Text page via [facility] Paging/Directory                   Please do not hesitate to contact me if you have any questions/concerns.     Sincerely,     ELIZABETH Yarbrough CNP

## 2024-02-02 NOTE — NURSING NOTE
Chief Complaint   Patient presents with    Follow Up     63 year old female with history of VSD s/p repair 1969, RA, HTN, insomnia, atrial tachyarrhythmias, cardiac arrest, and DLBCL presenting for follow up with device check prior. s/p PVC ablation 12/1/22.       Vitals were taken and medications reconciled.    Lucy Lin CNA  10:54 AM

## 2024-02-02 NOTE — PROGRESS NOTES
Electrophysiology Clinic Note  HPI:   Ms. Michel is a 63 year old female who has a past medical history significant for VSD s/p repair 1969, RA, HTN, insomnia, atrial tachyarrhythmias, cardiac arrest May 2017, SSS s/p PPM 12/2019,  DLBCL, VT s/p VT ablation 12/1/2017and exudative pericardial effusion, and severe TR s/p TVT 4/10/23.      She was admitted from 5/15/17-5/23/17 with atrial tachyarrhythmias (SVT, AF, AFL) with symptoms of palpitations, fatigue, and nausea. An echo showed LVEF 40-45%, mildly reduced RVEF, moderate biatrial enlargement, mild mitral regurgitation, and moderate tricuspid regurgitation. . She was started on Eliquis and underwent a BRE/DCCV on 5/17/17 c/b hypotension and recurrent arrhythmia.She was started on Sotalol and chemically cardioverted. However, she had nausea and thought it was from the Sotalol so this was stopped. She reverted back to AF/AFL and a rate control strategy was adopted which was difficult given symptoms so she started amiodarone. CMRI  showed LVEF 55%, mildly dilated RV with focal area of dyskinesis in RVOT, severe bi-atrial enlargement, severe TR, mild/moderate MR, and DE at RV septal insertion site. RFA was discussed but was defered as patient had a UTI at the time with ESBL.     She was readmitted on 6/19/17-8/4/17 after suffering an out of hospital cardiac arrest.  Upon EMS arrival, she was in VF. She was externally defibrillated and had ROSC in the field. She was intubated and brought to [facility] found to be in escape rhythm 43 bpm. She was taken emergently to cath lab where coronary angiogram showed normal coronary arteries. Temporary pacemaker was placed in RA given sinus arrest. She was also placed on therapeutic hypothermia. She had been having nausea, diarrhea, and intermittent vomiting over the last week and generally had not been feeling well over the last month and also had saddle anesthesia with lower extremity numbness that had been evaluated by neuro as an  "outpatient.  Ultimately found to have DLBCL started on chemo cycles. 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     Her DLBCL was treated with R-EPOCH for one cycle while in the hospital complicated by cytopenias followed by 5 cycles of R-CHOP finished the cycles in December of 2017 currently on surveillance scans. She had a pericardial effusion for which she is on colchicine which was discontinued at the end of 6/2018.      She has then followed intermittently with EP in clinic. She had some recurrent AF in 4/2018 requiring ED visit with DCCV with recurrence and another DCCV. We restarted digoxin 4/4/18 after which she had one episode of AF s/p DCCV and has since maintained sinus rhythm and reported good symptomatic control. She was seen in 5/2018 and reported having a feeling of her heart \"rolling\" daily lasting about about 10 sec at the most. The last time she required DCCV was in 4/12/2018. Her cMRI had shown some organization of her [ericardial effusion) and she has been initiated on colchicine. Chest CT in 9/2018 showed no evidence of lymphoma recurrence and improved pericardial effusion. Last EP follow up was in 2/2019 and she was doing well without issues.    She then presented 12/12/2019 with 1 day history palpitations, headaches, and nausea. She also endorses some dizziness when walking but not at rest. She was found to be bradycardic with intermittent junctional rhythm into 30's. Electrolytes and renal function stable. Her atenolol and digoxin were held. She continued to have junctional/bradycardia and decision was made to pursue PPM.    EP Visit 4/10/20: She presents today for follow up. She reports feeling well. She states she does have intermittent episodes of palpitations, that have not been overly bothersome to her. Device site well healed. She denies any chest pain/pressures, dizziness, " lightheadedness, worsening shortness of breath, leg/ankle swelling, PND, orthopnea, or syncopal symptoms. Device site is reported well healed. Device transmission shows presenting rhythm is AP/VS @ 60 bpm. 1 NSVT, 6 Fast A&V and 272 AF episodes recorded. All EGMs show AF/AT with RVR. AF burden= 75.3%. She is taking eliquis. Normal device function. AP= 26% and = 13%. No short V-V intervals recorded. Lead trends appear stable. Battery estimates 14.2 years to OZZY. Current cardiac medications include: Elqiuis, Atenolol, Digoxin, Lasix, and Spironolactone.     EP Visit 10/9/20: She presents today for follow up. She reports feeling well. She denies any chest pain/pressures, dizziness, lightheadedness, worsening shortness of breath, leg/ankle swelling, PND, orthopnea, palpitations, or syncopal symptoms. Device interrogation shows normal pacemaker function. 2 Fast A&V and 1 NSVT episodes recorded since last remote transmission. Intrinsic rhythm = Atrial Flutter, w/ VS @ 88 bpm. AF burden = 85.9%. Pt is on Eliquis. AP = 16.7%.  = 23.6%.  Estimated battery longevity to OZZY = 13.6 years. No short v-v intervals recorded. Lead trends appear stable. Presenting 12 lead ECG shows AF/AFL and  Vent Rate 69 bpm,  ms, QTc 473 ms. Echo shows LVEF 60-65%, mildly dilated RV with normal function, moderate TR, unchanged from prior echo. Current cardiac medications include: Elqiuis, Atenolol, Digoxin, Lasix, and Spironolactone.     EP visit 8/25/21: She presents today for follow up. She had some more RVR and her digoxin was resumed. She has been having ongoing fatigue which is now improved with better rate control. She denies any chest pain/pressures, dizziness, lightheadedness, worsening shortness of breath, leg/ankle swelling, PND, orthopnea, palpitations, or syncopal symptoms. Device interrogations shows normal device function, stable lead parameters, intrinsic is AFL/VS/ at 60 bpm, HR histograms adequate, 100% AF/AFL since  "11/2020, AP <0.1%,  49.3%. Echo today shows normal LVEF 60-65%, mildly decreased RV function, moderate TI, and dilated IVC. Current cardiac medications include: Elqiuis, Atenolol, Digoxin, Lasix, and Spironolactone. SHe was asymptomatic with her AFL with preserved EF, hence we continued conservative management.    EP visit 8/22/22: She presents today for follow-up. Patient feels she is \"in failure\" because she feels bloated and liver under tension. TTE done today shows a normal EF, dilated IVC with mod to sev TR and some pulmonary HTN, severe biatrial enlargement. Creatinine 1.09 7/20/22, weight has not increased much but she feels like it did  Otherwise no bleeding issues with Eliquis.   Her device interrogation today shows that she is V pacing 77% of the time, had runs been as nonsustained VT but available EGM suggest atrial fibrillation with RVR, battery life 11 years.  Her EKG today shows a baseline of atrial fibrillation with ventricular pacing at 68 bpm.AT that visit her lasix was adjusted temporarly.    EP Visit 2/1/23: She presents today for follow-up after she was admitted to the hospital in December 2023 for heart failure exacerbation and RVR from her chronic AF. Her diuretics were optimized She is feeling better since her discharge.  She had runs of VT in the hospital and underwent a VT ablation on 12/1/22. She is not feeling any of the symptoms she was having during the VT runs in the hospital. She has had no recurrences of VT. While in the hospital, she met with Dr Walker in the hospital who wants to keep her HR more permissive. Today her HR is at 92 bpm at rest.NSVT runs on device are more likely AF with RVR. No bleeding issues with Eliquis.    EP Visit 8/8/23: She presents today for follow up. She underwent TVR on 4/10/23. She is recovering well post operatively. She reports feeling well overall. She is asymptomatic with her SVT and NSVT. She denies chest discomfort, palpitations, abdominal " fullness/bloating or peripheral edema, shortness of breath, paroxysmal nocturnal dyspnea, orthopnea, lightheadedness, dizziness, pre-syncope, or syncope. A zio patch monitor from 6/30/23-7/7/23 showed 6 NSVT up to 7.3 seconds and 1575 PSVT up to 2 minutes and 3 seconds with no symptoms reported. Device interrogation shows normal device function, stable lead parameters, 33.7% AF burden, 412 NSVT some of the episodes have RR irregularity noted, and AP 49.4%,  60.7%. Current cardiac medications include: Diltiazem, Spironolactone, ASA, and Eliquis.     She presents today for follow up. She reports feeling well. She had a partial hip fracture and required JEFF recently. She is recovering well after this. She denies chest discomfort, palpitations, abdominal fullness/bloating or peripheral edema, shortness of breath, paroxysmal nocturnal dyspnea, orthopnea, lightheadedness, dizziness, pre-syncope, or syncope. A zio patch monitor from 1/2024 showed 2 NSVTs up to 29 beats and 4.8% PVC burden. Device interrogation shows normal device function, stable lead parameters, 3.9% AF burden, 30 NSVT up to 2 seconds, and AP 85.3%,  97.9%. Current cardiac medications include: Diltiazem, Spironolactone, ASA, and Eliquis.       PAST MEDICAL HISTORY:  Past Medical History:   Diagnosis Date    Antiplatelet or antithrombotic long-term use     Arrhythmia     Arthritis     Atrial fibrillation (H)     Atrial flutter (H)     Cancer (H) June 2017    DLBCL currently in remission    Cardiac arrest (H) 2017    Chronic kidney disease     Congestive heart failure (H)     Diffuse large B-cell lymphoma of extranodal site (H) 2017    Extremity restricted from procedure     Left arm    Fall 12/06/2023    Cut on head    H/O blood transfusion reaction     Hives with platelet transfusion, needs pre-medication with tylenol and benadryl    Heart murmur     History of blood clots 2017    PICC line Right arm     History of chemotherapy     History of  transfusion     Hypertension     Lymphedema of both lower extremities     wears lymphedema socks    Neuropathy     Feet and hands    NSVT (nonsustained ventricular tachycardia) (H) 12/2022    Pacemaker 2019    Pericardial effusion 2017    Physical deconditioning     PONV (postoperative nausea and vomiting)     Positive PPD, 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    Prediabetes     Pulmonary hypertension (H)     RA (rheumatoid arthritis) (H)     SSS (sick sinus syndrome) (H) 2019    Pacemaker    Steroid-induced osteoporosis     Thrombocytopenia (H24)     VSD (ventricular septal defect)        CURRENT MEDICATIONS:  Current Outpatient Medications   Medication Sig Dispense Refill    acetaminophen (TYLENOL) 325 MG tablet Take 3 tablets (975 mg) by mouth every 8 hours      alendronate (FOSAMAX) 70 MG tablet Take 1 tablet (70 mg) by mouth every 7 days (Patient taking differently: Take 70 mg by mouth every 7 days Mondays) 12 tablet 3    apixaban ANTICOAGULANT (ELIQUIS ANTICOAGULANT) 5 MG tablet Take 1 tablet (5 mg) by mouth 2 times daily 180 tablet 3    aspirin (ASA) 81 MG chewable tablet 1 tablet (81 mg) by Oral or NG Tube route daily 30 tablet 2    calcium carbonate 600 mg-vitamin D 400 units (CALTRATE) 600-400 MG-UNIT per tablet Take 2 tablets by mouth daily      diltiazem ER (DILT-XR) 240 MG 24 hr ER beaded capsule Take 1 capsule (240 mg) by mouth daily 90 capsule 3    docusate sodium (COLACE) 100 MG capsule Take 100 mg by mouth 2 times daily as needed for constipation      empagliflozin (JARDIANCE) 10 MG TABS tablet Take 1 tablet (10 mg) by mouth daily (Patient taking differently: Take 10 mg by mouth daily Stopping 1/20/24 before surgery) 90 tablet 3    gabapentin (NEURONTIN) 100 MG capsule Take 100 mg by mouth 2 times daily AM and Afternoon      gabapentin (NEURONTIN) 100 MG capsule Take 200 mg by mouth at bedtime      hydroxychloroquine (PLAQUENIL) 200 MG tablet Take 1 tablet (200 mg) by mouth daily 90 tablet 1    loratadine (CLARITIN) 10 MG  tablet Take 10 mg by mouth daily as needed      magnesium oxide 200 MG TABS Take 200 mg by mouth daily bedtime      multivitamin, therapeutic with minerals (THERA-VIT-M) TABS tablet Take 1 tablet by mouth daily (Patient taking differently: Take 1 tablet by mouth daily Stopping 1/17/24 before surgery) 30 each 0    oxyCODONE (ROXICODONE) 5 MG tablet Take 1-2 tablets (5-10 mg) by mouth every 4 hours as needed for moderate pain 25 tablet 0    potassium chloride ER (KLOR-CON M) 20 MEQ CR tablet Take 3 tablets (60 mEq) by mouth daily 270 tablet 3    predniSONE (DELTASONE) 1 MG tablet Take 3 tablets (3 mg) by mouth daily - Oral 270 tablet 1    senna-docusate (SENOKOT-S/PERICOLACE) 8.6-50 MG tablet Take 1 tablet by mouth 2 times daily as needed for constipation 25 tablet 0    spironolactone (ALDACTONE) 25 MG tablet Take 0.5 tablets (12.5 mg) by mouth daily 90 tablet 1    torsemide (DEMADEX) 20 MG tablet Take 1 tablet (20 mg) by mouth daily 90 tablet 3    Vitamin D (Cholecalciferol) 10 MCG (400 UNIT) TABS Take 1 tablet by mouth daily         PAST SURGICAL HISTORY:  Past Surgical History:   Procedure Laterality Date    ANESTHESIA CARDIOVERSION N/A 05/17/2017    Procedure: ANESTHESIA CARDIOVERSION;  ANESTHESIA CARDIOVERSION;  Surgeon: GENERIC ANESTHESIA PROVIDER;  Location: [facility]    ANESTHESIA CARDIOVERSION  03/12/2018    Procedure: ANESTHESIA CARDIOVERSION;;  Surgeon: GENERIC ANESTHESIA PROVIDER;  Location: [facility]    ANESTHESIA CARDIOVERSION N/A 03/23/2018    Procedure: ANESTHESIA CARDIOVERSION;  Anesthesia Cardioversion ;  Surgeon: GENERIC ANESTHESIA PROVIDER;  Location: [facility]    ANESTHESIA CARDIOVERSION N/A 04/12/2018    Procedure: ANESTHESIA CARDIOVERSION;  Cardioversion;  Surgeon: GENERIC ANESTHESIA PROVIDER;  Location: [facility]    ARTHRODESIS WRIST  2000    Right wrist    BONE MARROW ASPIRATE &BIOPSY  07/12/2017         BONE MARROW BIOPSY W/ ASPIRATION  07/12/2017    CARDIOVERSION      5/17/17, 3/12/18, 3/23/18, 4/14/18,      COLONOSCOPY N/A 11/19/2019    Procedure: Colonoscopy, With Polypectomy And Biopsy;  Surgeon: Pj Vasques MD;  Location: [facility]    CV CORONARY ANGIOGRAM N/A 11/28/2022    Procedure: Coronary Angiogram;  Surgeon: Sundar Wood MD;  Location:  HEART CARDIAC CATH LAB    CV RIGHT HEART CATH MEASUREMENTS RECORDED N/A 11/28/2022    Procedure: Right Heart Catheterization;  Surgeon: Sundar Wood MD;  Location: [facility] CARDIAC CATH LAB    EP ABLATION PVC N/A 12/01/2022    Procedure: Ablation Premature Ventricular Contractions;  Surgeon: Juan Eaton MD;  Location:  HEART CARDIAC CATH LAB    EP PACEMAKER N/A 12/13/2019    Procedure: EP Pacemaker;  Surgeon: Pj Trent MD;  Location: [facility] CARDIAC CATH LAB    EXCISE LESION UPPER EXTREMITY Left 05/22/2018    Procedure: EXCISE LESION UPPER EXTREMITY;  Left Arm Wound Excision And Closure, Possible Submuscular Transposition of Ulnar Nerve  (Choice Anesthesia);  Surgeon: Kevin Sheldon MD;  Location: [facility]    FOOT SURGERY      4 left and 2 right    FOOT SURGERY      4 Left and 2 Right     IMPLANT PACEMAKER  12/13/2019    Dr China Trent  [facility] Cardiac Cath lab     IMPLANT PACEMAKER      RELEASE CARPAL TUNNEL Bilateral     REPAIR VALVE TRICUSPID MINIMALLY INVASIVE N/A 04/10/2023    Procedure: MINIMALLY INVASIVE TRICUSPID VALVE REPLACEMENT USING 29MM [facility] [place] EPIC VALVE, FEMORAL CANNULATION AND RIGHT GROIN CUTDOWN, CARDIOPULMONARY BYPASS, TRANSESOPHAGEAL ECHOCARDIOGRAM PERFORMED BY ANESTHESIA STAFF;  Surgeon: Chilango Cope MD;  Location: [facility]    REPAIR VENTRICULAR SEPTAL DEFECT  1969    TOTAL HIP ARTHROPLASTY Left 1/24/2024    Procedure: LEFT TOTAL HIP ARTHROPLASTY DIRECT ANTERIOR APPROACH ORTHOGRID;  Surgeon: Romeo Quintana MD;  Location: [facility]    TRANSPOSITION ULNAR NERVE (ELBOW) Left 05/22/2018    Procedure: TRANSPOSITION ULNAR NERVE (ELBOW);;  Surgeon: Kevin Sheldon MD;  Location: [facility]    VSD REPAIR   "1969    ZZC FUSION FOOT BONES,SUBTALAR Right 10/20/2020    Procedure: RIGHT SUBTALAR JOINT FUSION AND CALCANEOCUBOID FUSION;  Surgeon: Nemesio Crow MD;  Location: [facility];  Service: Orthopedics       ALLERGIES:     Allergies   Allergen Reactions    Blood Transfusion Related (Informational Only) Hives     Hives with platelets. Give benadryl premedication.    Amiodarone Other (See Comments) and Difficulty breathing     Lethargic and had trouble breathing- occurred in 2017    Metoprolol Other (See Comments)     Pt and  report that metoprolol does not work for her and also reports feeling unwell with this medication. She has been able to tolerate atenolol, which as worked in controlling her HR.     Penicillins Other (See Comments)     Childhood reaction, concern for tongue swelling    Tape [Adhesive Tape] Rash       FAMILY HISTORY:  Family History   Problem Relation Age of Onset    Breast Cancer Mother         60s    Hypertension Mother     Alzheimer Disease Mother     Cerebrovascular Disease Mother     Hypertension Father     Cerebrovascular Disease Father     Atrial fibrillation Father     Lymphoma Father     Prostate Cancer Father     Other Cancer Father         some form of Lymphoma    Alzheimer Disease Maternal Grandmother         likely    Arthritis Maternal Grandfather     Pneumonia Maternal Grandfather     Diabetes Paternal Grandmother     Mental Illness Sister         early onset  alzheimers       SOCIAL HISTORY:  Social History     Tobacco Use    Smoking status: Never    Smokeless tobacco: Never   Vaping Use    Vaping Use: Never used   Substance Use Topics    Alcohol use: Yes     Comment: 2 drinks per week    Drug use: Never         Exam:  /73 (BP Location: Right arm, Patient Position: Sitting, Cuff Size: Adult Regular)   Pulse 73   Ht 1.48 m (4' 10.27\")   Wt 48.2 kg (106 lb 4.8 oz)   SpO2 95%   BMI 22.01 kg/m    CONSITUTIONAL: no acute distress  HEENT: no icterus, no redness " or discharge, neck supple  CV: no visible edema of visualized extremities.  RESPIRATORY: respirations nonlabored, no cough  NEURO: AA&Ox3, speech fluent/appropriate, motor grossly nonfocal  PSYCH: cooperative, affect appropriate  DERM: no rashes on visualized face/neck/upper extremities        Testing/Procedures:  9/11/17 ECHOCARDIOGRAM:   Interpretation Summary  Left ventricular function, chamber size, wall motion, and wall thickness are  normal.The EF is 60-65% (traced 60%.) GLS -18%.  The right ventricle is normal size and normal in function. The RV is mildly dilated.  Moderate tricuspid insufficiency is present.  Mild pulmonary hypertension is present (esimated PASP 55 mmHg including RA pressure.)  Dilation of the inferior vena cava is present with abnormal respiratory  variation in diameter (esitimated RAP 15 mmHg.)  This study was compared with the study from 8/31/17: TR appears slightly decreased from the earlier study.     7/24/17 CMRI:     1. The LV is normal in cavity size and wall thickness. The global systolic function is normal. The LVEF is  64%. There are no regional wall motion abnormalities. No evidence of myocardial iron overload.   2. The RV is normal in cavity size. The global systolic function is normal. The RVEF is 59%.   3. There is moderate dilation of bilateral atria.   4. Tricuspid regurgitation is present, difficult to quantify due to image quality.   5. Late gadolinium enhancement imaging shows RV insertion site hyperenhancement, a non-specific finding  seen in patients with RV volume/pressure overload.   6. There is a moderate right pleural effusion and small left pleural effusion.    CONCLUSIONS: Normal Bi-Ventricular systolic function, LVEF 64% and RVE 59%. There is no evidence of  infiltrative disease. Compared to the MRI from 5/15/2017, there is no significant change.      7/2017 BRE:  Interpretation Summary  There is a small mass (0.7 cm by 0.2 cm) on the ventricular side of the  right  coronary cusp. There is a second mass (0.4 cm by 0.2 cm) observed in the LVOT,  attached to the mitro-aortic curtain. There is a third mass on the noncoronary  cusp that measures 0.4 cm by 0.2 cm that could be a healing vegetation. These  findings are compatible with endocarditis if clinical picture supports.  Right ventricular function, chamber size, wall motion, and thickness are  normal.  This study was compared with the study from 7/10/2017.  There is detection of masses on the aortic valve and LVOT.     4/12/18 CMRI:  1. The LV is normal in cavity size and wall thickness. The global systolic function is normal. The LVEF is  54%. There are no regional wall motion abnormalities.  2. The RV is mildly dilated in cavity size. The global systolic function is normal. The RVEF is 49%.   3. Both atria are severely enlarged.  4. There is at least moderate, possibly severe tricuspid regurgitation.   5. Late gadolinium enhancement imaging shows hyperenhancement in the RV insertion site consistent with RV  pressure/volume overload.   6. There is a loculated pericardial effusion along the basal lateral and basal to mid inferior LV walls,  and along the inferior RV wall. it appears exudative in nature, and is beginning to organize. There is  diffuse pericardial enhancement.  7. There is no intracardiac thrombus or mass.  8. There is a small right pleural effusion.    9/26/18 CHEST CT:                                                                   IMPRESSION: In this patient with a history of lymphoma:  1. No evidence of disease recurrence, consistent with complete  response per Lugano criteria.  2. Stable cardiomegaly. Improved pericardial effusion.  3. Early contrast phase with heterogeneous visceral enhancement in the  abdomen, likely secondary to cardiac dysfunction.    10/9/20 ECHOCARDIOGRAM:  Interpretation Summary  VSD s/p repair in 1969  Global and regional left ventricular function is normal with an EF of  60-65%.  No flow acceleration is seen across the septum.  Global right ventricular function is normal. Mild right ventricular dilation  is present.  There is moderate tricuspid regurgitation.  The estimated PA systolic pressure is 32 mmHg but this is likely to be  underestimated due to the presence of multiple jets.  This study was compared with the study from 09/11/2017. There has been no  change in the severity of the tricuspid regurgitation or the RV size/function.  8/2021 ECHOCARDIOGRAM:  terpretation Summary  H/O of VSD repair in 1969  The visual ejection fraction is 60-65%. No wall motion abnormalities. Global  right ventricular function is mildly reduced.  Moderate tricuspid insufficiency is present.  There is a right ventricular right atrial pressure difference of 24mmHg.  IVC diameter >2.1 cm collapsing <50% with sniff suggests a high RA pressure  estimated at 15 mmHg or greater.  No pericardial effusion is present.    TTE 8/26/22:  Interpretation Summary  Left ventricular function, chamber size, wall motion, and thickness are normal.  Severe biatrial enlargement is present.  Moderate to severe tricuspid insufficiency is present.  Dilation of the inferior vena cava is present with abnormal respiratory  variation in diameter.  No pericardial effusion is present.    Assessment and Plan:   Ms. Michel is a 63 year old female who has a past medical history significant for VSD s/p repair 1969, RA, HTN, insomnia, atrial tachyarrhythmias, cardiac arrest May 2017, SSS s/p PPM 12/2019,  DLBCL, VT s/p VT ablation 12/1/2017and exudative pericardial effusion, and severe TR s/p TVT 4/10/23.     RV VT, focal:  S/p Vt ablation on 12/1/2022. No recurrences of sustained VT.  Does have short runs of NSVT, overall not symptomatic to her and not high burden at this time.    Sick Sinus Syndrome:   Atrial Fibrillation:  1. Stroke Prophylaxis:  CHADSVASC=+HTN, +gender  2, corresponding to a 2.2% annual stroke / systemic emolism  event rate. indicating need for long term oral anticoagulation.  She is on Eliquis. No bleeding issues.   2. Rate Control: Continue diltiazem, increased to 240 CD daily.  3. Rhythm Control: Cardioversion, Antiarrhythmics and/or ablation are options for rhythm control. She has had several DCCV last in 4/12/19. Notably, she had possible side effects from Sotalol (however, likely was due to underlying illness at the time and not from medication since she was found to have DBCL).  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  However post TVR has been having SR with 33% AF burden on device.  No intervention needed at this time per patient preference and asymptomatic.  4. Had tachy-shelton syndrome and underwent PPM implant in 12/2019. Normal device function with stable lead parameters. She will have continued follow up with Device clinic per routine.     Follow-up in 1 year or sooner if need arises.     The patient states understanding and is agreeable with plan.   ELIZABETH Verde CNP  Electrophysiology Consult Service  Securely message with [employer]   Text page via [facility] Paging/Directory

## 2024-02-05 LAB
MDC_IDC_EPISODE_DTM: NORMAL
MDC_IDC_EPISODE_DURATION: 0 S
MDC_IDC_EPISODE_DURATION: 1 S
MDC_IDC_EPISODE_DURATION: 131 S
MDC_IDC_EPISODE_DURATION: 14 S
MDC_IDC_EPISODE_DURATION: 141 S
MDC_IDC_EPISODE_DURATION: 1444 S
MDC_IDC_EPISODE_DURATION: 1545 S
MDC_IDC_EPISODE_DURATION: 1579 S
MDC_IDC_EPISODE_DURATION: 179 S
MDC_IDC_EPISODE_DURATION: 180 S
MDC_IDC_EPISODE_DURATION: 181 S
MDC_IDC_EPISODE_DURATION: 182 S
MDC_IDC_EPISODE_DURATION: 183 S
MDC_IDC_EPISODE_DURATION: 187 S
MDC_IDC_EPISODE_DURATION: 187 S
MDC_IDC_EPISODE_DURATION: 188 S
MDC_IDC_EPISODE_DURATION: 2 S
MDC_IDC_EPISODE_DURATION: 205 S
MDC_IDC_EPISODE_DURATION: 207 S
MDC_IDC_EPISODE_DURATION: 253 S
MDC_IDC_EPISODE_DURATION: 270 S
MDC_IDC_EPISODE_DURATION: 284 S
MDC_IDC_EPISODE_DURATION: 337 S
MDC_IDC_EPISODE_DURATION: 3622 S
MDC_IDC_EPISODE_DURATION: 423 S
MDC_IDC_EPISODE_DURATION: 493 S
MDC_IDC_EPISODE_DURATION: 647 S
MDC_IDC_EPISODE_DURATION: 8 S
MDC_IDC_EPISODE_DURATION: 866 S
MDC_IDC_EPISODE_DURATION: NORMAL S
MDC_IDC_EPISODE_ID: NORMAL
MDC_IDC_EPISODE_TYPE: NORMAL
MDC_IDC_EPISODE_TYPE_INDUCED: NO
MDC_IDC_LEAD_CONNECTION_STATUS: NORMAL
MDC_IDC_LEAD_CONNECTION_STATUS: NORMAL
MDC_IDC_LEAD_IMPLANT_DT: NORMAL
MDC_IDC_LEAD_IMPLANT_DT: NORMAL
MDC_IDC_LEAD_LOCATION: NORMAL
MDC_IDC_LEAD_LOCATION: NORMAL
MDC_IDC_LEAD_LOCATION_DETAIL_1: NORMAL
MDC_IDC_LEAD_LOCATION_DETAIL_1: NORMAL
MDC_IDC_LEAD_MFG: NORMAL
MDC_IDC_LEAD_MFG: NORMAL
MDC_IDC_LEAD_MODEL: NORMAL
MDC_IDC_LEAD_MODEL: NORMAL
MDC_IDC_LEAD_POLARITY_TYPE: NORMAL
MDC_IDC_LEAD_POLARITY_TYPE: NORMAL
MDC_IDC_LEAD_SERIAL: NORMAL
MDC_IDC_LEAD_SERIAL: NORMAL
MDC_IDC_LEAD_SPECIAL_FUNCTION: NORMAL
MDC_IDC_LEAD_SPECIAL_FUNCTION: NORMAL
MDC_IDC_MSMT_BATTERY_DTM: NORMAL
MDC_IDC_MSMT_BATTERY_REMAINING_LONGEVITY: 117 MO
MDC_IDC_MSMT_BATTERY_RRT_TRIGGER: 2.62
MDC_IDC_MSMT_BATTERY_STATUS: NORMAL
MDC_IDC_MSMT_BATTERY_VOLTAGE: 2.99 V
MDC_IDC_MSMT_LEADCHNL_RA_IMPEDANCE_VALUE: 323 OHM
MDC_IDC_MSMT_LEADCHNL_RA_IMPEDANCE_VALUE: 418 OHM
MDC_IDC_MSMT_LEADCHNL_RA_PACING_THRESHOLD_AMPLITUDE: 0.75 V
MDC_IDC_MSMT_LEADCHNL_RA_PACING_THRESHOLD_PULSEWIDTH: 0.4 MS
MDC_IDC_MSMT_LEADCHNL_RA_SENSING_INTR_AMPL: 1.75 MV
MDC_IDC_MSMT_LEADCHNL_RV_IMPEDANCE_VALUE: 494 OHM
MDC_IDC_MSMT_LEADCHNL_RV_IMPEDANCE_VALUE: 589 OHM
MDC_IDC_MSMT_LEADCHNL_RV_PACING_THRESHOLD_AMPLITUDE: 1 V
MDC_IDC_MSMT_LEADCHNL_RV_PACING_THRESHOLD_PULSEWIDTH: 0.4 MS
MDC_IDC_MSMT_LEADCHNL_RV_SENSING_INTR_AMPL: 14.75 MV
MDC_IDC_PG_IMPLANT_DTM: NORMAL
MDC_IDC_PG_MFG: NORMAL
MDC_IDC_PG_MODEL: NORMAL
MDC_IDC_PG_SERIAL: NORMAL
MDC_IDC_PG_TYPE: NORMAL
MDC_IDC_SESS_CLINIC_NAME: NORMAL
MDC_IDC_SESS_DTM: NORMAL
MDC_IDC_SESS_TYPE: NORMAL
MDC_IDC_SET_BRADY_AT_MODE_SWITCH_RATE: 171 {BEATS}/MIN
MDC_IDC_SET_BRADY_HYSTRATE: NORMAL
MDC_IDC_SET_BRADY_LOWRATE: 75 {BEATS}/MIN
MDC_IDC_SET_BRADY_MAX_SENSOR_RATE: 130 {BEATS}/MIN
MDC_IDC_SET_BRADY_MAX_TRACKING_RATE: 130 {BEATS}/MIN
MDC_IDC_SET_BRADY_MODE: NORMAL
MDC_IDC_SET_BRADY_PAV_DELAY_LOW: 180 MS
MDC_IDC_SET_BRADY_SAV_DELAY_LOW: 150 MS
MDC_IDC_SET_LEADCHNL_RA_PACING_AMPLITUDE: 1.5 V
MDC_IDC_SET_LEADCHNL_RA_PACING_ANODE_ELECTRODE_1: NORMAL
MDC_IDC_SET_LEADCHNL_RA_PACING_ANODE_LOCATION_1: NORMAL
MDC_IDC_SET_LEADCHNL_RA_PACING_CAPTURE_MODE: NORMAL
MDC_IDC_SET_LEADCHNL_RA_PACING_CATHODE_ELECTRODE_1: NORMAL
MDC_IDC_SET_LEADCHNL_RA_PACING_CATHODE_LOCATION_1: NORMAL
MDC_IDC_SET_LEADCHNL_RA_PACING_POLARITY: NORMAL
MDC_IDC_SET_LEADCHNL_RA_PACING_PULSEWIDTH: 0.4 MS
MDC_IDC_SET_LEADCHNL_RA_SENSING_ANODE_ELECTRODE_1: NORMAL
MDC_IDC_SET_LEADCHNL_RA_SENSING_ANODE_LOCATION_1: NORMAL
MDC_IDC_SET_LEADCHNL_RA_SENSING_CATHODE_ELECTRODE_1: NORMAL
MDC_IDC_SET_LEADCHNL_RA_SENSING_CATHODE_LOCATION_1: NORMAL
MDC_IDC_SET_LEADCHNL_RA_SENSING_POLARITY: NORMAL
MDC_IDC_SET_LEADCHNL_RA_SENSING_SENSITIVITY: 0.3 MV
MDC_IDC_SET_LEADCHNL_RV_PACING_AMPLITUDE: 2 V
MDC_IDC_SET_LEADCHNL_RV_PACING_ANODE_ELECTRODE_1: NORMAL
MDC_IDC_SET_LEADCHNL_RV_PACING_ANODE_LOCATION_1: NORMAL
MDC_IDC_SET_LEADCHNL_RV_PACING_CAPTURE_MODE: NORMAL
MDC_IDC_SET_LEADCHNL_RV_PACING_CATHODE_ELECTRODE_1: NORMAL
MDC_IDC_SET_LEADCHNL_RV_PACING_CATHODE_LOCATION_1: NORMAL
MDC_IDC_SET_LEADCHNL_RV_PACING_POLARITY: NORMAL
MDC_IDC_SET_LEADCHNL_RV_PACING_PULSEWIDTH: 0.4 MS
MDC_IDC_SET_LEADCHNL_RV_SENSING_ANODE_ELECTRODE_1: NORMAL
MDC_IDC_SET_LEADCHNL_RV_SENSING_ANODE_LOCATION_1: NORMAL
MDC_IDC_SET_LEADCHNL_RV_SENSING_CATHODE_ELECTRODE_1: NORMAL
MDC_IDC_SET_LEADCHNL_RV_SENSING_CATHODE_LOCATION_1: NORMAL
MDC_IDC_SET_LEADCHNL_RV_SENSING_POLARITY: NORMAL
MDC_IDC_SET_LEADCHNL_RV_SENSING_SENSITIVITY: 0.9 MV
MDC_IDC_SET_ZONE_DETECTION_INTERVAL: 350 MS
MDC_IDC_SET_ZONE_DETECTION_INTERVAL: 400 MS
MDC_IDC_SET_ZONE_STATUS: NORMAL
MDC_IDC_SET_ZONE_STATUS: NORMAL
MDC_IDC_SET_ZONE_TYPE: NORMAL
MDC_IDC_SET_ZONE_VENDOR_TYPE: NORMAL
MDC_IDC_STAT_AT_BURDEN_PERCENT: 3.9 %
MDC_IDC_STAT_AT_DTM_END: NORMAL
MDC_IDC_STAT_AT_DTM_START: NORMAL
MDC_IDC_STAT_BRADY_AP_VP_PERCENT: 87.61 %
MDC_IDC_STAT_BRADY_AP_VS_PERCENT: 0.22 %
MDC_IDC_STAT_BRADY_AS_VP_PERCENT: 10.3 %
MDC_IDC_STAT_BRADY_AS_VS_PERCENT: 1.87 %
MDC_IDC_STAT_BRADY_DTM_END: NORMAL
MDC_IDC_STAT_BRADY_DTM_START: NORMAL
MDC_IDC_STAT_BRADY_RA_PERCENT_PACED: 85.26 %
MDC_IDC_STAT_BRADY_RV_PERCENT_PACED: 97.86 %
MDC_IDC_STAT_EPISODE_RECENT_COUNT: 0
MDC_IDC_STAT_EPISODE_RECENT_COUNT: 0
MDC_IDC_STAT_EPISODE_RECENT_COUNT: 37
MDC_IDC_STAT_EPISODE_RECENT_COUNT: 384
MDC_IDC_STAT_EPISODE_RECENT_COUNT: 4
MDC_IDC_STAT_EPISODE_RECENT_COUNT_DTM_END: NORMAL
MDC_IDC_STAT_EPISODE_RECENT_COUNT_DTM_START: NORMAL
MDC_IDC_STAT_EPISODE_TOTAL_COUNT: 0
MDC_IDC_STAT_EPISODE_TOTAL_COUNT: 401
MDC_IDC_STAT_EPISODE_TOTAL_COUNT: 5207
MDC_IDC_STAT_EPISODE_TOTAL_COUNT: 815
MDC_IDC_STAT_EPISODE_TOTAL_COUNT: NORMAL
MDC_IDC_STAT_EPISODE_TOTAL_COUNT_DTM_END: NORMAL
MDC_IDC_STAT_EPISODE_TOTAL_COUNT_DTM_START: NORMAL
MDC_IDC_STAT_EPISODE_TYPE: NORMAL
MDC_IDC_STAT_TACHYTHERAPY_RECENT_DTM_END: NORMAL
MDC_IDC_STAT_TACHYTHERAPY_RECENT_DTM_START: NORMAL
MDC_IDC_STAT_TACHYTHERAPY_TOTAL_DTM_END: NORMAL
MDC_IDC_STAT_TACHYTHERAPY_TOTAL_DTM_START: NORMAL

## 2024-02-06 ENCOUNTER — TRANSFERRED RECORDS (OUTPATIENT)
Dept: HEALTH INFORMATION MANAGEMENT | Facility: CLINIC | Age: 64
End: 2024-02-06
Payer: COMMERCIAL

## 2024-03-07 ENCOUNTER — MYC REFILL (OUTPATIENT)
Dept: ENDOCRINOLOGY | Facility: CLINIC | Age: 64
End: 2024-03-07
Payer: COMMERCIAL

## 2024-03-07 DIAGNOSIS — T38.0X5A STEROID-INDUCED OSTEOPOROSIS: ICD-10-CM

## 2024-03-07 DIAGNOSIS — M81.8 STEROID-INDUCED OSTEOPOROSIS: ICD-10-CM

## 2024-03-07 RX ORDER — ALENDRONATE SODIUM 70 MG/1
70 TABLET ORAL
Qty: 12 TABLET | Refills: 3 | OUTPATIENT
Start: 2024-03-07

## 2024-03-07 NOTE — TELEPHONE ENCOUNTER
Per notes from 8/29/23  We will transition to zoledronic acid 5 mg IV given over 60 minutes.  We reviewed potential adverse effects in detail including osteonecrosis of the jaw, atypical femoral fracture, and flulike reaction.  Amelia already takes scheduled Tylenol which will minimize the risk of flulike reaction.     Prior to Reclast we will update bone labs and this is ordered.  If serum calcium remains elevated we will need to investigate this further.

## 2024-03-08 ENCOUNTER — TRANSFERRED RECORDS (OUTPATIENT)
Dept: HEALTH INFORMATION MANAGEMENT | Facility: CLINIC | Age: 64
End: 2024-03-08
Payer: COMMERCIAL

## 2024-03-09 DIAGNOSIS — I50.22 CHRONIC SYSTOLIC HEART FAILURE (H): Primary | ICD-10-CM

## 2024-03-22 ENCOUNTER — OFFICE VISIT (OUTPATIENT)
Dept: CARDIOLOGY | Facility: CLINIC | Age: 64
End: 2024-03-22
Attending: NURSE PRACTITIONER
Payer: COMMERCIAL

## 2024-03-22 ENCOUNTER — LAB (OUTPATIENT)
Dept: LAB | Facility: CLINIC | Age: 64
End: 2024-03-22
Attending: NURSE PRACTITIONER
Payer: COMMERCIAL

## 2024-03-22 VITALS
SYSTOLIC BLOOD PRESSURE: 135 MMHG | BODY MASS INDEX: 23.26 KG/M2 | HEART RATE: 82 BPM | WEIGHT: 112.3 LBS | DIASTOLIC BLOOD PRESSURE: 83 MMHG | OXYGEN SATURATION: 98 %

## 2024-03-22 DIAGNOSIS — I50.30 HEART FAILURE WITH PRESERVED EJECTION FRACTION, NYHA CLASS I (H): ICD-10-CM

## 2024-03-22 DIAGNOSIS — C83.30 DIFFUSE LARGE B-CELL LYMPHOMA, UNSPECIFIED BODY REGION (H): ICD-10-CM

## 2024-03-22 DIAGNOSIS — I50.22 CHRONIC SYSTOLIC HEART FAILURE (H): ICD-10-CM

## 2024-03-22 DIAGNOSIS — E87.6 HYPOKALEMIA: ICD-10-CM

## 2024-03-22 LAB
ALBUMIN SERPL BCG-MCNC: 4.5 G/DL (ref 3.5–5.2)
ALP SERPL-CCNC: 131 U/L (ref 40–150)
ALT SERPL W P-5'-P-CCNC: 21 U/L (ref 0–70)
ANION GAP SERPL CALCULATED.3IONS-SCNC: 12 MMOL/L (ref 7–15)
AST SERPL W P-5'-P-CCNC: 34 U/L (ref 0–45)
BILIRUB SERPL-MCNC: 0.7 MG/DL
BUN SERPL-MCNC: 31.5 MG/DL (ref 8–23)
CALCIUM SERPL-MCNC: 10 MG/DL (ref 8.8–10.2)
CHLORIDE SERPL-SCNC: 95 MMOL/L (ref 98–107)
CREAT SERPL-MCNC: 1.02 MG/DL (ref 0.51–1.17)
DEPRECATED HCO3 PLAS-SCNC: 27 MMOL/L (ref 22–29)
EGFRCR SERPLBLD CKD-EPI 2021: 62 ML/MIN/1.73M2
GLUCOSE SERPL-MCNC: 89 MG/DL (ref 70–99)
LDH SERPL L TO P-CCNC: 228 U/L (ref 0–250)
POTASSIUM SERPL-SCNC: 4.3 MMOL/L (ref 3.4–5.3)
PROT SERPL-MCNC: 7.9 G/DL (ref 6.4–8.3)
SODIUM SERPL-SCNC: 134 MMOL/L (ref 135–145)

## 2024-03-22 PROCEDURE — 99213 OFFICE O/P EST LOW 20 MIN: CPT | Performed by: NURSE PRACTITIONER

## 2024-03-22 PROCEDURE — 99214 OFFICE O/P EST MOD 30 MIN: CPT | Performed by: NURSE PRACTITIONER

## 2024-03-22 PROCEDURE — 80053 COMPREHEN METABOLIC PANEL: CPT | Performed by: PATHOLOGY

## 2024-03-22 PROCEDURE — 36415 COLL VENOUS BLD VENIPUNCTURE: CPT | Performed by: PATHOLOGY

## 2024-03-22 PROCEDURE — 83615 LACTATE (LD) (LDH) ENZYME: CPT | Performed by: PATHOLOGY

## 2024-03-22 RX ORDER — POTASSIUM CHLORIDE 1500 MG/1
60 TABLET, EXTENDED RELEASE ORAL DAILY
Qty: 270 TABLET | Refills: 3 | Status: SHIPPED | OUTPATIENT
Start: 2024-03-22 | End: 2024-08-12

## 2024-03-22 ASSESSMENT — PAIN SCALES - GENERAL: PAINLEVEL: NO PAIN (0)

## 2024-03-22 NOTE — LETTER
3/22/2024      RE: Amelia Michel  [address]  [facility] [address]       Dear Colleague,    Thank you for the opportunity to participate in the care of your patient, Amelia Michel, at the [facility] [place] [facility]. Please see a copy of my visit note below.      [facility] Cardiology   CORE Clinic      HPI:   Ms. 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 She was recently admitted 11/19/-12/5/22 with ADHF and frequent NSVT, now s/p VT ablation. She presents to [facility] [place], [place] for hospital follow up.     With regards to her cardiac conditions, as noted, Ms. Michel presented to [facility] on 11/19 with MONTALVO, BLE edema, and 8 pound weight gain over the course of one week. Chest X-ray was suspicious for pulmonary edema, NT-P BNP 1,913, Troponin T 21-->12. EDW per chart review 116-120; admission weight 128. She was admitted w/ decompensated diastolic heart failure with pictutre c/b frequent non sustained ventricular tachycardia with RHC on 11/28/22 notable for cardiogenic shock with a cardiac index of 1.2 and CO of 1.6. Etiology thought to most likely secondary to frequent non sustained ventricular tachycardia in the setting of possible restrictive/constrictive physiology given her malignancy history (albeit presumably in remission). CAD ruled on by coronary angiogram completed on 11/28/22. She underwent VT ablation on 12/1/22 and had successful resumption of her home medications with increased diuretic dosing and adddition of an SGLT2 inhibitor. She was diuresed  19 lbs to a weight of 109 lbs at time of discharge.     At CORE visit on 1/9/23 Ms. Michel was hypervolemic as  evident by weight gain, tachycardia, and peripheral edema. Review of labs demonstrated persistent hyponatremia with Na 127 and bump in Cr to 1.35. Lasix was increase 60 mg BID. Still awaiting for PA for cardiomems device. Admitted 1/24-1/29 for CHF exacerbation. Diuresed and switched to torsemide 40 mg BID. Discharged at a weight of 107 lb.     Device interrogation from 2/7 with rates 120s-140, AFL. Diltiazem increased to 240 mg daily. Since our last CORE visit in 2/2023 she has resumed torsemide 20 mg BID and spironolactone 12.5 mg daily. She was evaluated by Dr. Cope for severe TR. Admitted 4/10-4/17/23 for elective TVR with Dr. Cope.     CORE visit in 5/2023, Ms. Michel was feeling well. Weights are very stable at 103-104 lbs. Echo at that time showed preserved LVEF and mildly improved RV function. Diltiazem was increased to 180 mg daily for elevated heart rates. Started on spironolactone.   CORE visit 9/2023 feeling well. Weight stable at 108 lb. No medication changes.   Underwent hip surgery in January 2024.     Today, Ms. Michel reports feeling well. No cardiac concerns. She is without peripheral edema, worsening dyspnea on exertion, orthopnea, or PND.  Denies chest pain, SOB, palpitations, lightheadedness, syncope, presyncope. Remains on eliquis for CVA prophylaxis and ASA 81 mg with new valve. No s/s of bleeding complications. She has gained ~ 5 lbs over the last 2 months. Thinks its diet/activity related weight gain. 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     Cardiac Medications:   - ASA 81 mg daily  - Eliquis 5 mg BID  - Diltiazem 240 mg daily   - Jardiance 10 mg daily   - Torsemide 20 mg daily  - KCL 60 mEq daily    - Spironolactone 12.5 mg daily      PAST MEDICAL HISTORY:  Past Medical History:   Diagnosis Date    Antiplatelet or antithrombotic long-term use     Arrhythmia     Arthritis     Atrial  fibrillation (H)     Atrial flutter (H)     Cancer (H) June 2017    DLBCL currently in remission    Cardiac arrest (H) 2017    Chronic kidney disease     Congestive heart failure (H)     Diffuse large B-cell lymphoma of extranodal site (H) 2017    Extremity restricted from procedure     Left arm    Fall 12/06/2023    Cut on head    H/O blood transfusion reaction     Hives with platelet transfusion, needs pre-medication with tylenol and benadryl    Heart murmur     History of blood clots 2017    PICC line Right arm     History of chemotherapy     History of transfusion     Hypertension     Lymphedema of both lower extremities     wears lymphedema socks    Neuropathy     Feet and hands    NSVT (nonsustained ventricular tachycardia) (H) 12/2022    Pacemaker 2019    Pericardial effusion 2017    Physical deconditioning     PONV (postoperative nausea and vomiting)     Positive PPD, 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    Prediabetes     Pulmonary hypertension (H)     RA (rheumatoid arthritis) (H)     SSS (sick sinus syndrome) (H) 2019    Pacemaker    Steroid-induced osteoporosis     Thrombocytopenia (H24)     VSD (ventricular septal defect)        FAMILY HISTORY:  Family History   Problem Relation Age of Onset    Breast Cancer Mother         60s    Hypertension Mother     Alzheimer Disease Mother     Cerebrovascular Disease Mother     Hypertension Father     Cerebrovascular Disease Father     Atrial fibrillation Father     Lymphoma Father     Prostate Cancer Father     Other Cancer Father         some form of Lymphoma    Alzheimer Disease Maternal Grandmother         likely    Arthritis Maternal Grandfather     Pneumonia Maternal Grandfather     Diabetes Paternal Grandmother     Mental Illness Sister         early onset  alzheimers       SOCIAL HISTORY:  Social History     Socioeconomic History    Marital status:      Spouse name: Romeo Michel    Number of children: 0   Occupational History     Employer: [employer]    Tobacco Use    Smoking status: Never    Smokeless tobacco: Never   Substance and Sexual Activity    Alcohol use: Not Currently     Comment: none    Drug use: No   Other Topics Concern    Parent/sibling w/ CABG, MI or angioplasty before 65F 55M? No     Social Determinants of Health     Intimate Partner Violence: Not At Risk    Fear of Current or Ex-Partner: No    Emotionally Abused: No    Physically Abused: No    Sexually Abused: No       CURRENT MEDICATIONS:  acetaminophen (TYLENOL) 325 MG tablet, Take 3 tablets (975 mg) by mouth every 8 hours  alendronate (FOSAMAX) 70 MG tablet, Take 1 tablet (70 mg) by mouth every 7 days (Patient taking differently: Take 70 mg by mouth every 7 days Mondays)  apixaban ANTICOAGULANT (ELIQUIS ANTICOAGULANT) 5 MG tablet, Take 1 tablet (5 mg) by mouth 2 times daily  aspirin (ASA) 81 MG chewable tablet, 1 tablet (81 mg) by Oral or NG Tube route daily  calcium carbonate 600 mg-vitamin D 400 units (CALTRATE) 600-400 MG-UNIT per tablet, Take 2 tablets by mouth daily  diltiazem ER (DILT-XR) 240 MG 24 hr ER beaded capsule, Take 1 capsule (240 mg) by mouth daily  docusate sodium (COLACE) 100 MG capsule, Take 100 mg by mouth 2 times daily as needed for constipation  empagliflozin (JARDIANCE) 10 MG TABS tablet, Take 1 tablet (10 mg) by mouth daily (Patient taking differently: Take 10 mg by mouth daily Stopping 1/20/24 before surgery)  gabapentin (NEURONTIN) 100 MG capsule, Take 100 mg by mouth 2 times daily AM and Afternoon  gabapentin (NEURONTIN) 100 MG capsule, Take 200 mg by mouth at bedtime  hydroxychloroquine (PLAQUENIL) 200 MG tablet, Take 1 tablet (200 mg) by mouth daily  loratadine (CLARITIN) 10 MG tablet, Take 10 mg by mouth daily as needed  magnesium oxide 200 MG TABS, Take 200 mg by mouth daily bedtime  multivitamin, therapeutic with minerals (THERA-VIT-M) TABS tablet, Take 1 tablet by mouth daily (Patient taking differently: Take 1 tablet by mouth daily Stopping 1/17/24 before  surgery)  oxyCODONE (ROXICODONE) 5 MG tablet, Take 1-2 tablets (5-10 mg) by mouth every 4 hours as needed for moderate pain  potassium chloride ER (KLOR-CON M) 20 MEQ CR tablet, Take 3 tablets (60 mEq) by mouth daily  predniSONE (DELTASONE) 1 MG tablet, Take 3 tablets (3 mg) by mouth daily - Oral  senna-docusate (SENOKOT-S/PERICOLACE) 8.6-50 MG tablet, Take 1 tablet by mouth 2 times daily as needed for constipation  spironolactone (ALDACTONE) 25 MG tablet, Take 0.5 tablets (12.5 mg) by mouth daily  torsemide (DEMADEX) 20 MG tablet, Take 1 tablet (20 mg) by mouth daily  Vitamin D (Cholecalciferol) 10 MCG (400 UNIT) TABS, Take 1 tablet by mouth daily    No current facility-administered medications on file prior to visit.      ROS:   Refer to HPI    EXAM:  /83 (BP Location: Right arm, Patient Position: Sitting, Cuff Size: Adult Regular)   Pulse 82   Wt 50.9 kg (112 lb 4.8 oz)   SpO2 98%   BMI 23.26 kg/m     GENERAL: Appears comfortable, in no acute distress.   HEENT: Eye symmetrical, no discharge or icterus bilaterally. Mucous membranes moist and without lesions.  CV: tachycardic, irregular, +S1S2, systolic murmur, no rub, or gallop. JVP at clavicle    RESPIRATORY: Respirations regular, even, and unlabored. Lungs CTA throughout.   GI: Soft and non distended with normoactive bowel sounds present in all quadrants. No tenderness, rebound, guarding. No hepatomegaly.   EXTREMITIES: No peripheral edema. 2+ bilateral pedal pulses.   NEUROLOGIC: Alert and oriented x 3. No focal deficits.   MUSCULOSKELETAL: No joint swelling or tenderness.   SKIN: No jaundice. No rashes or lesions.     Labs, reviewed with patient in clinic today:  CBC RESULTS:  Lab Results   Component Value Date    WBC 5.4 01/15/2024    WBC Canceled, Test credited 03/16/2021    RBC 4.35 01/15/2024    RBC Canceled, Test credited 03/16/2021    HGB 11.1 (L) 01/25/2024    HGB Canceled, Test credited 03/16/2021    HCT 45.4 01/15/2024    HCT Canceled, Test  credited 03/16/2021     (H) 01/15/2024    MCV Canceled, Test credited 03/16/2021    MCH 34.9 (H) 01/15/2024    MCH Canceled, Test credited 03/16/2021    MCHC 33.5 01/15/2024    MCHC Canceled, Test credited 03/16/2021    RDW 11.9 01/15/2024    RDW Canceled, Test credited 03/16/2021     (L) 01/15/2024    PLT Canceled, Test credited 03/16/2021       CMP RESULTS:  Lab Results   Component Value Date     01/24/2024     (L) 11/29/2022     06/23/2021    POTASSIUM 4.1 01/24/2024    POTASSIUM 3.5 04/10/2023    POTASSIUM 3.9 07/20/2022    POTASSIUM 4.2 06/23/2021    CHLORIDE 98 01/24/2024    CHLORIDE 101 07/20/2022    CHLORIDE 102 06/23/2021    CO2 28 01/24/2024    CO2 28 07/20/2022    CO2 28 06/23/2021    ANIONGAP 9 01/24/2024    ANIONGAP 7 07/20/2022    ANIONGAP 6 06/23/2021     (H) 01/25/2024     (H) 01/24/2024     (H) 07/20/2022    GLC 98 06/23/2021    BUN 26.8 (H) 01/24/2024    BUN 31 (H) 07/20/2022    BUN 25 06/23/2021    CR 0.89 01/24/2024    CR 1.05 (H) 06/23/2021    GFRESTIMATED 72 01/24/2024    GFRESTIMATED 57 (L) 06/23/2021    GFRESTBLACK 67 06/23/2021    VIKTORIYA 9.4 01/24/2024    VIKTORIYA 10.0 06/23/2021    BILITOTAL 0.9 09/14/2023    BILITOTAL 1.2 06/23/2021    ALBUMIN 5.0 09/21/2023    ALBUMIN 4.4 04/20/2022    ALBUMIN 4.2 06/23/2021    ALKPHOS 116 09/14/2023    ALKPHOS 136 06/23/2021    ALT 22 09/21/2023    ALT 41 06/23/2021    AST 35 09/21/2023    AST 36 06/23/2021        INR RESULTS:  Lab Results   Component Value Date    INR 1.71 (H) 04/10/2023    INR 3.42 (H) 03/07/2018       Lab Results   Component Value Date    MAG 2.0 04/16/2023    MAG 1.9 04/12/2018     Lab Results   Component Value Date    NTBNPI 2,067 (H) 01/24/2023    NTBNPI 485 12/12/2019     Lab Results   Component Value Date    NTBNP 1,425 (H) 10/13/2021    NTBNP 1,274 (H) 05/26/2021       Cardiac Diagnostics:    11/16/2023 Device Interrogation   Remote pacemaker transmission received and reviewed. Device  transmission sent per MD orders.     Device: Medtronic [id number] [place] XT  MRI  Normal Device Function  Mode: DDDR  bpm  AP: 82.8%  : 98.1%  Presenting EGM: AP- @ 75 bpm  Short V-V intervals: 0  Lead Trends Appear Stable  Estimated battery longevity to RRT = 10 years. Battery voltage = 3.00 V.   Atrial Arrhythmia: 202 AT/AF episodes recorded - < 1 min - 13 hours 31 minutes.  AF [place]: 7.0%  Anticoagulant: Eliquis  Ventricular Arrhythmia: 8 NSVT episodes recorded - 1-2 seconds, 167-218 bpm.   2 VT episodes recorded - 5-7 seconds, 167-188 bpm.   Pt Notified of Transmission Results: MyChart      5/24/2023 Echo   Interpretation Summary  Tricuspid valve replacement with 29mm [place] Epic.  Doppler interrogation of the tricspid valve is normal.  Global and regional left ventricular function is normal with an EF of 55-60%.  The right ventricle is normal size. Global right ventricular function is  mildly to moderately reduced.  Severe biatrial enlargement is present.  Mild to moderate mitral insufficiency is present.  IVC diameter >2.1 cm collapsing <50% with sniff suggests a high RA pressure  estimated at 15 mmHg or greater.  No pericardial effusion is present.  Compared to prior, RV appears slightly better, CVP is higher now.    4/17/2023 Echo   Interpretation Summary  Minimally invasive tricuspid valve replacement with 29mm [place] Epic. Doppler  interrogation of the tricspid valve is normal. TV mean gradient 4-6 mmHg.  Moderate right ventricular dilation is present. Global right ventricular  function is mildly to moderately reduced.  Global and regional left ventricular function is normal with an EF of 60-65%.  Mild to moderate mitral insufficiency is present.  IVC diameter <2.1 cm collapsing >50% with sniff suggests a normal RA pressure  of 3 mmHg.  No pericardial effusion is present.  Compared to prior TVR is new.    2/7/2023 Device Interrogation   Device: Medtronic [id number] [place] XT DR GALLEGOS  Normal Device  Function.  Mode: VVIR  bpm  : 8.6%  Presenting EGM: Narrow Complex tachycardia @ 139 bpm  Short V-V intervals: 0  Lead Trends Appear Stable.  Estimated battery longevity to RRT = 11 years. Battery voltage = 3.02 V.  Atrial Arrhythmia: AFL  Anticoagulant: Apixaban  Ventricular Arrhythmia: 4 episodes lasting 1-4 sec 194-245 bpm  Transmission discussed with Maria Esther Cutler NP. Orders to increase diltiazem from 180 mg daily to 240 mg daily.    12/5/2022 ECHO  Interpretation Summary  Severe tricuspid insufficiency is present.  Moderate right ventricular dilation is present. Global right ventricular  function is mildly reduced.  Global and regional left ventricular function is normal with an EF of 55-60%.  IVC diameter >2.1 cm collapsing <50% with sniff suggests a high RA pressure  estimated at 15 mmHg or greater.  No pericardial effusion is present.  No significant changes noted.    11/28/2022 RHC with coronary angiogram   Right sided filling pressures are severely elevated.  Mild elevated pulmonary hypertension.  Left sided filling pressures are moderately elevated.  Reduced cardiac output level.  Hemodynamic data has been modified in Epic per physician review.  Prox LAD lesion is 30% stenosed.     Mild non-obstructive coronary artery disease        Assessment and Plan:   Ms. 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 She was recently admitted 11/19/-12/5/22 with ADHF and frequent NSVT, now s/p VT ablation. [place] [facility] [place]     Overall, appearing well. Euvolemic by exam. Review of today's  labs demonstrate stable renal function and normal lytes. Functional class I-II symptoms. Discussed weight gain. Knows that she needs to modify diet and gradually increase activity. No medication changes. Refill provided for KCL.     # Acute on chronic diastolic heart failure with dilated and mildly reduced RV function (LVEF 55-60%)  Pre-Ablation: 11/28/22 RHC: CVP 24, PA 48/27/34, PCWP 23, SVO2 46, CI 1.1, CO 1.6  Post Ablation and Diuresis: 12/3/22 RHC: CVP 16, PA 41/22/28, PCWP 15, SVO2 69, CI 2.1, CO 3.0    NYHA Class II, AHA/ACC Stage C  Rate control: 82, continue diltiazem   volume status: euvolemic, continue torsemide 20 mg daily    Blood pressure control:  Diltiazem 240 mg daily   Aldosterone antagonist: Aldactone 12.5 mg daily   SGLT2:  Empagliflozin 10 mg daily   Evaluation of coronary arteries: 11/28/22 angiogram mild non-obstructive CAD     # Frequent, non-sustained ventricular tachycardia s/p VT Ablation (by Dr. Eaton on 12/1/22)  - Anti-arrhythmic: none, s/p VT ablation  - Stopped PTA digoxin per EP recommendations  - Anticoagulation: given history of Afib, continue Apixaban 5 mg bid  - Follow up with EP specialists (Dr. Eaton and/or Maria Esther Cutler NP)    # HTN Intolerant to Metoprolol in the past.   - Diltiazem 240 mg daily, spironolactone 12.5 mg daily       # Aflutter. History of SSS s/p PPM 12/19. Long standing history of atrial arrythmias.  - Diltiazem 240 mg daily  - Continue Eliquis.   - Follow up with EP annually.       # VSD s/p repair.     # History of exudative pericardial effusion.   - Stable per CT 3/19.     # Severe TR per TTE 11/2022 s/p TVR on 4/10/23  - continue ASA 81 mg daily         Follow up:  - CORE 6 months         Elmira Vasquez DNP, NP-C  Advance Heart Failure  3/22/24

## 2024-03-22 NOTE — PATIENT INSTRUCTIONS
CORE: Cardiomyopathy, Optimization, Rehabilitation, Education      Take your medicines every day, as directed    Changes/Recommendations made today:  No recommended changes. You look great!!     Monitor Your Weight and Symptoms    Contact us if you:    Gain 2 pounds in one day or 5 pounds in one week  Feel more short of breath  Notice more leg swelling  Feel lightheadeded   Change your lifestyle    Limit Salt or Sodium:  2000 mg  Limit Fluids:  2000 mL or approximately 64 ounces  Eat a Heart Healthy Diet  Low in saturated fats  Stay Active:  Aim to move at least 150 minutes every  week         To Contact us    During Business Hours:  [phone], option # 1      After hours, weekends or holidays:   [phone], Option #4  Ask to speak to the On-Call Cardiologist. Inform them you are a CORE/heart failure patient at the [facility].     Use [employer] allows you to communicate directly with your heart team through secure messaging.  [employer] can be accessed any time on your phone, computer, or tablet.  If you need assistance, we'd be happy to help!         Keep your Heart Appointments:    CORE 6 months

## 2024-03-22 NOTE — PROGRESS NOTES
[facility] Cardiology   CORE Clinic      HPI:   Ms. 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 She was recently admitted 11/19/-12/5/22 with ADHF and frequent NSVT, now s/p VT ablation. She presents to [facility] for hospital follow up.     With regards to her cardiac conditions, as noted, Ms. Michel presented to [facility] on 11/19 with MONTALVO, BLE edema, and 8 pound weight gain over the course of one week. Chest X-ray was suspicious for pulmonary edema, NT-P BNP 1,913, Troponin T 21-->12. EDW per chart review 116-120; admission weight 128. She was admitted w/ decompensated diastolic heart failure with pictutre c/b frequent non sustained ventricular tachycardia with RHC on 11/28/22 notable for cardiogenic shock with a cardiac index of 1.2 and CO of 1.6. Etiology thought to most likely secondary to frequent non sustained ventricular tachycardia in the setting of possible restrictive/constrictive physiology given her malignancy history (albeit presumably in remission). CAD ruled on by coronary angiogram completed on 11/28/22. She underwent VT ablation on 12/1/22 and had successful resumption of her home medications with increased diuretic dosing and adddition of an SGLT2 inhibitor. She was diuresed 19 lbs to a weight of 109 lbs at time of discharge.     At CORE visit on 1/9/23 Ms. Michel was hypervolemic as  evident by weight gain, tachycardia, and peripheral edema. Review of labs demonstrated persistent hyponatremia with Na 127 and bump in Cr to 1.35. Lasix was increase 60 mg BID. Still awaiting for PA for cardiomems device. Admitted  1/24-1/29 for CHF exacerbation. Diuresed and switched to torsemide 40 mg BID. Discharged at a weight of 107 lb.     Device interrogation from 2/7 with rates 120s-140, AFL. Diltiazem increased to 240 mg daily. Since our last CORE visit in 2/2023 she has resumed torsemide 20 mg BID and spironolactone 12.5 mg daily. She was evaluated by Dr. Cope for severe TR. Admitted 4/10-4/17/23 for elective TVR with Dr. Cope.     CORE visit in 5/2023, Ms. Michel was feeling well. Weights are very stable at 103-104 lbs. Echo at that time showed preserved LVEF and mildly improved RV function. Diltiazem was increased to 180 mg daily for elevated heart rates. Started on spironolactone.   CORE visit 9/2023 feeling well. Weight stable at 108 lb. No medication changes.   Underwent hip surgery in January 2024.     Today, Ms. Michel reports feeling well. No cardiac concerns. She is without peripheral edema, worsening dyspnea on exertion, orthopnea, or PND.  Denies chest pain, SOB, palpitations, lightheadedness, syncope, presyncope. Remains on eliquis for CVA prophylaxis and ASA 81 mg with new valve. No s/s of bleeding complications. She has gained ~ 5 lbs over the last 2 months. Thinks its diet/activity related weight gain. 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     Cardiac Medications:   - ASA 81 mg daily  - Eliquis 5 mg BID  - Diltiazem 240 mg daily   - Jardiance 10 mg daily   - Torsemide 20 mg daily  - KCL 60 mEq daily    - Spironolactone 12.5 mg daily      PAST MEDICAL HISTORY:  Past Medical History:   Diagnosis Date    Antiplatelet or antithrombotic long-term use     Arrhythmia     Arthritis     Atrial fibrillation (H)     Atrial flutter (H)     Cancer (H) June 2017    DLBCL currently in remission    Cardiac arrest (H) 2017    Chronic kidney disease     Congestive heart failure (H)     Diffuse large B-cell lymphoma of extranodal site (H) 2017    Extremity restricted from procedure     Left arm    Fall 12/06/2023    Cut on head    H/O blood  transfusion reaction     Hives with platelet transfusion, needs pre-medication with tylenol and benadryl    Heart murmur     History of blood clots 2017    PICC line Right arm     History of chemotherapy     History of transfusion     Hypertension     Lymphedema of both lower extremities     wears lymphedema socks    Neuropathy     Feet and hands    NSVT (nonsustained ventricular tachycardia) (H) 12/2022    Pacemaker 2019    Pericardial effusion 2017    Physical deconditioning     PONV (postoperative nausea and vomiting)     Positive PPD, 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    Prediabetes     Pulmonary hypertension (H)     RA (rheumatoid arthritis) (H)     SSS (sick sinus syndrome) (H) 2019    Pacemaker    Steroid-induced osteoporosis     Thrombocytopenia (H24)     VSD (ventricular septal defect)        FAMILY HISTORY:  Family History   Problem Relation Age of Onset    Breast Cancer Mother         60s    Hypertension Mother     Alzheimer Disease Mother     Cerebrovascular Disease Mother     Hypertension Father     Cerebrovascular Disease Father     Atrial fibrillation Father     Lymphoma Father     Prostate Cancer Father     Other Cancer Father         some form of Lymphoma    Alzheimer Disease Maternal Grandmother         likely    Arthritis Maternal Grandfather     Pneumonia Maternal Grandfather     Diabetes Paternal Grandmother     Mental Illness Sister         early onset  alzheimers       SOCIAL HISTORY:  Social History     Socioeconomic History    Marital status:      Spouse name: Romeo Michel    Number of children: 0   Occupational History     Employer: [employer]   Tobacco Use    Smoking status: Never    Smokeless tobacco: Never   Substance and Sexual Activity    Alcohol use: Not Currently     Comment: none    Drug use: No   Other Topics Concern    Parent/sibling w/ CABG, MI or angioplasty before 65F 55M? No     Social Determinants of Health     Intimate Partner Violence: Not At Risk    Fear of  Current or Ex-Partner: No    Emotionally Abused: No    Physically Abused: No    Sexually Abused: No       CURRENT MEDICATIONS:  acetaminophen (TYLENOL) 325 MG tablet, Take 3 tablets (975 mg) by mouth every 8 hours  alendronate (FOSAMAX) 70 MG tablet, Take 1 tablet (70 mg) by mouth every 7 days (Patient taking differently: Take 70 mg by mouth every 7 days Mondays)  apixaban ANTICOAGULANT (ELIQUIS ANTICOAGULANT) 5 MG tablet, Take 1 tablet (5 mg) by mouth 2 times daily  aspirin (ASA) 81 MG chewable tablet, 1 tablet (81 mg) by Oral or NG Tube route daily  calcium carbonate 600 mg-vitamin D 400 units (CALTRATE) 600-400 MG-UNIT per tablet, Take 2 tablets by mouth daily  diltiazem ER (DILT-XR) 240 MG 24 hr ER beaded capsule, Take 1 capsule (240 mg) by mouth daily  docusate sodium (COLACE) 100 MG capsule, Take 100 mg by mouth 2 times daily as needed for constipation  empagliflozin (JARDIANCE) 10 MG TABS tablet, Take 1 tablet (10 mg) by mouth daily (Patient taking differently: Take 10 mg by mouth daily Stopping 1/20/24 before surgery)  gabapentin (NEURONTIN) 100 MG capsule, Take 100 mg by mouth 2 times daily AM and Afternoon  gabapentin (NEURONTIN) 100 MG capsule, Take 200 mg by mouth at bedtime  hydroxychloroquine (PLAQUENIL) 200 MG tablet, Take 1 tablet (200 mg) by mouth daily  loratadine (CLARITIN) 10 MG tablet, Take 10 mg by mouth daily as needed  magnesium oxide 200 MG TABS, Take 200 mg by mouth daily bedtime  multivitamin, therapeutic with minerals (THERA-VIT-M) TABS tablet, Take 1 tablet by mouth daily (Patient taking differently: Take 1 tablet by mouth daily Stopping 1/17/24 before surgery)  oxyCODONE (ROXICODONE) 5 MG tablet, Take 1-2 tablets (5-10 mg) by mouth every 4 hours as needed for moderate pain  potassium chloride ER (KLOR-CON M) 20 MEQ CR tablet, Take 3 tablets (60 mEq) by mouth daily  predniSONE (DELTASONE) 1 MG tablet, Take 3 tablets (3 mg) by mouth daily - Oral  senna-docusate (SENOKOT-S/PERICOLACE)  8.6-50 MG tablet, Take 1 tablet by mouth 2 times daily as needed for constipation  spironolactone (ALDACTONE) 25 MG tablet, Take 0.5 tablets (12.5 mg) by mouth daily  torsemide (DEMADEX) 20 MG tablet, Take 1 tablet (20 mg) by mouth daily  Vitamin D (Cholecalciferol) 10 MCG (400 UNIT) TABS, Take 1 tablet by mouth daily    No current facility-administered medications on file prior to visit.      ROS:   Refer to HPI    EXAM:  /83 (BP Location: Right arm, Patient Position: Sitting, Cuff Size: Adult Regular)   Pulse 82   Wt 50.9 kg (112 lb 4.8 oz)   SpO2 98%   BMI 23.26 kg/m     GENERAL: Appears comfortable, in no acute distress.   HEENT: Eye symmetrical, no discharge or icterus bilaterally. Mucous membranes moist and without lesions.  CV: tachycardic, irregular, +S1S2, systolic murmur, no rub, or gallop. JVP at clavicle    RESPIRATORY: Respirations regular, even, and unlabored. Lungs CTA throughout.   GI: Soft and non distended with normoactive bowel sounds present in all quadrants. No tenderness, rebound, guarding. No hepatomegaly.   EXTREMITIES: No peripheral edema. 2+ bilateral pedal pulses.   NEUROLOGIC: Alert and oriented x 3. No focal deficits.   MUSCULOSKELETAL: No joint swelling or tenderness.   SKIN: No jaundice. No rashes or lesions.     Labs, reviewed with patient in clinic today:  CBC RESULTS:  Lab Results   Component Value Date    WBC 5.4 01/15/2024    WBC Canceled, Test credited 03/16/2021    RBC 4.35 01/15/2024    RBC Canceled, Test credited 03/16/2021    HGB 11.1 (L) 01/25/2024    HGB Canceled, Test credited 03/16/2021    HCT 45.4 01/15/2024    HCT Canceled, Test credited 03/16/2021     (H) 01/15/2024    MCV Canceled, Test credited 03/16/2021    MCH 34.9 (H) 01/15/2024    MCH Canceled, Test credited 03/16/2021    MCHC 33.5 01/15/2024    MCHC Canceled, Test credited 03/16/2021    RDW 11.9 01/15/2024    RDW Canceled, Test credited 03/16/2021     (L) 01/15/2024    PLT Canceled, Test  credited 03/16/2021       CMP RESULTS:  Lab Results   Component Value Date     01/24/2024     (L) 11/29/2022     06/23/2021    POTASSIUM 4.1 01/24/2024    POTASSIUM 3.5 04/10/2023    POTASSIUM 3.9 07/20/2022    POTASSIUM 4.2 06/23/2021    CHLORIDE 98 01/24/2024    CHLORIDE 101 07/20/2022    CHLORIDE 102 06/23/2021    CO2 28 01/24/2024    CO2 28 07/20/2022    CO2 28 06/23/2021    ANIONGAP 9 01/24/2024    ANIONGAP 7 07/20/2022    ANIONGAP 6 06/23/2021     (H) 01/25/2024     (H) 01/24/2024     (H) 07/20/2022    GLC 98 06/23/2021    BUN 26.8 (H) 01/24/2024    BUN 31 (H) 07/20/2022    BUN 25 06/23/2021    CR 0.89 01/24/2024    CR 1.05 (H) 06/23/2021    GFRESTIMATED 72 01/24/2024    GFRESTIMATED 57 (L) 06/23/2021    GFRESTBLACK 67 06/23/2021    VIKTORIYA 9.4 01/24/2024    VIKTORIYA 10.0 06/23/2021    BILITOTAL 0.9 09/14/2023    BILITOTAL 1.2 06/23/2021    ALBUMIN 5.0 09/21/2023    ALBUMIN 4.4 04/20/2022    ALBUMIN 4.2 06/23/2021    ALKPHOS 116 09/14/2023    ALKPHOS 136 06/23/2021    ALT 22 09/21/2023    ALT 41 06/23/2021    AST 35 09/21/2023    AST 36 06/23/2021        INR RESULTS:  Lab Results   Component Value Date    INR 1.71 (H) 04/10/2023    INR 3.42 (H) 03/07/2018       Lab Results   Component Value Date    MAG 2.0 04/16/2023    MAG 1.9 04/12/2018     Lab Results   Component Value Date    NTBNPI 2,067 (H) 01/24/2023    NTBNPI 485 12/12/2019     Lab Results   Component Value Date    NTBNP 1,425 (H) 10/13/2021    NTBNP 1,274 (H) 05/26/2021       Cardiac Diagnostics:    11/16/2023 Device Interrogation   Remote pacemaker transmission received and reviewed. Device transmission sent per MD orders.     Device: Medtronic [id number] [place] XT DR MRI  Normal Device Function  Mode: DDDR  bpm  AP: 82.8%  : 98.1%  Presenting EGM: AP- @ 75 bpm  Short V-V intervals: 0  Lead Trends Appear Stable  Estimated battery longevity to RRT = 10 years. Battery voltage = 3.00 V.   Atrial Arrhythmia: 202 AT/AF  episodes recorded - < 1 min - 13 hours 31 minutes.  AF Neal: 7.0%  Anticoagulant: Eliquis  Ventricular Arrhythmia: 8 NSVT episodes recorded - 1-2 seconds, 167-218 bpm.   2 VT episodes recorded - 5-7 seconds, 167-188 bpm.   Pt Notified of Transmission Results: Queenie      5/24/2023 Echo   Interpretation Summary  Tricuspid valve replacement with 29mm [place] Silvio Epic.  Doppler interrogation of the tricspid valve is normal.  Global and regional left ventricular function is normal with an EF of 55-60%.  The right ventricle is normal size. Global right ventricular function is  mildly to moderately reduced.  Severe biatrial enlargement is present.  Mild to moderate mitral insufficiency is present.  IVC diameter >2.1 cm collapsing <50% with sniff suggests a high RA pressure  estimated at 15 mmHg or greater.  No pericardial effusion is present.  Compared to prior, RV appears slightly better, CVP is higher now.    4/17/2023 Echo   Interpretation Summary  Minimally invasive tricuspid valve replacement with 29mm St Silvio Epic. Doppler  interrogation of the tricspid valve is normal. TV mean gradient 4-6 mmHg.  Moderate right ventricular dilation is present. Global right ventricular  function is mildly to moderately reduced.  Global and regional left ventricular function is normal with an EF of 60-65%.  Mild to moderate mitral insufficiency is present.  IVC diameter <2.1 cm collapsing >50% with sniff suggests a normal RA pressure  of 3 mmHg.  No pericardial effusion is present.  Compared to prior TVR is new.    2/7/2023 Device Interrogation   Device: Aw[employer]ronic [id number] Claudia XT DR MRI  Normal Device Function.  Mode: VVIR  bpm  : 8.6%  Presenting EGM: Narrow Complex tachycardia @ 139 bpm  Short V-V intervals: 0  Lead Trends Appear Stable.  Estimated battery longevity to RRT = 11 years. Battery voltage = 3.02 V.  Atrial Arrhythmia: AFL  Anticoagulant: Apixaban  Ventricular Arrhythmia: 4 episodes lasting 1-4 sec 194-245  bpm  Transmission discussed with Maria Esther Cutler NP. Orders to increase diltiazem from 180 mg daily to 240 mg daily.    12/5/2022 ECHO  Interpretation Summary  Severe tricuspid insufficiency is present.  Moderate right ventricular dilation is present. Global right ventricular  function is mildly reduced.  Global and regional left ventricular function is normal with an EF of 55-60%.  IVC diameter >2.1 cm collapsing <50% with sniff suggests a high RA pressure  estimated at 15 mmHg or greater.  No pericardial effusion is present.  No significant changes noted.    11/28/2022 RHC with coronary angiogram   Right sided filling pressures are severely elevated.  Mild elevated pulmonary hypertension.  Left sided filling pressures are moderately elevated.  Reduced cardiac output level.  Hemodynamic data has been modified in Epic per physician review.  Prox LAD lesion is 30% stenosed.     Mild non-obstructive coronary artery disease        Assessment and Plan:   Ms. 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 She was recently admitted 11/19/-12/5/22 with ADHF and frequent NSVT, now s/p VT ablation. She presents to [facility]ORE for hospital follow up.     Overall, appearing well. Euvolemic by exam. Review of today's labs demonstrate stable renal function and normal lytes. Functional class I-II symptoms. Discussed weight gain. Knows that she needs to modify diet and gradually increase activity. No medication changes. Refill provided for KCL.     # Acute on chronic diastolic heart failure with dilated and mildly reduced RV function (LVEF  55-60%)  Pre-Ablation: 11/28/22 RHC: CVP 24, PA 48/27/34, PCWP 23, SVO2 46, CI 1.1, CO 1.6  Post Ablation and Diuresis: 12/3/22 RHC: CVP 16, PA 41/22/28, PCWP 15, SVO2 69, CI 2.1, CO 3.0    NYHA Class II, AHA/ACC Stage C  Rate control: 82, continue diltiazem   volume status: euvolemic, continue torsemide 20 mg daily    Blood pressure control:  Diltiazem 240 mg daily   Aldosterone antagonist: Aldactone 12.5 mg daily   SGLT2:  Empagliflozin 10 mg daily   Evaluation of coronary arteries: 11/28/22 angiogram mild non-obstructive CAD     # Frequent, non-sustained ventricular tachycardia s/p VT Ablation (by Dr. Eaton on 12/1/22)  - Anti-arrhythmic: none, s/p VT ablation  - Stopped PTA digoxin per EP recommendations  - Anticoagulation: given history of Afib, continue Apixaban 5 mg bid  - Follow up with EP specialists (Dr. Eaton and/or Maria Esther Cutler NP)    # HTN Intolerant to Metoprolol in the past.   - Diltiazem 240 mg daily, spironolactone 12.5 mg daily       # Aflutter. History of SSS s/p PPM 12/19. Long standing history of atrial arrythmias.  - Diltiazem 240 mg daily  - Continue Eliquis.   - Follow up with EP annually.       # VSD s/p repair.     # History of exudative pericardial effusion.   - Stable per CT 3/19.     # Severe TR per TTE 11/2022 s/p TVR on 4/10/23  - continue ASA 81 mg daily         Follow up:  - CORE 6 months         Elmira Vasquez DNP, NP-C  Advance Heart Failure  3/22/24

## 2024-04-01 ENCOUNTER — TELEPHONE (OUTPATIENT)
Dept: FAMILY MEDICINE | Facility: CLINIC | Age: 64
End: 2024-04-01
Payer: COMMERCIAL

## 2024-04-01 DIAGNOSIS — G62.9 PERIPHERAL POLYNEUROPATHY: ICD-10-CM

## 2024-04-01 DIAGNOSIS — G25.81 RESTLESS LEG SYNDROME: Primary | ICD-10-CM

## 2024-04-01 NOTE — LETTER
April 24, 2024      Amelia Michel  [address]  [facility] [address]        To Whom It May Concern,     Patient has been taking this dosing of gabapentin for years and this works well to treat her restless legs and neuropathy. I recommend she be able to continue at current dosing and this be covered by insurance. I am concerned if she reduces dose that symptoms will not be managed and that if she increases the dose she will have side effects which are especially concerning given her numerous chronic medical conditions and polypharmacy         Sincerely,        Gala Stringer PA-C

## 2024-04-01 NOTE — TELEPHONE ENCOUNTER
Prior Authorization Retail Medication Request    Medication/Dose: Gabapentin  Diagnosis and ICD code (if different than what is on RX):  Only seen as historical in chart  New/renewal/insurance change PA/secondary ins. PA:  Previously Tried and Failed:    Rationale:      Insurance   Primary: [employer]  Insurance ID:  [id number]    Pharmacy Information (if different than what is on RX)  Name:  [facility]  Phone:  [phone]  Fax:[phone]

## 2024-04-11 NOTE — TELEPHONE ENCOUNTER
PA Initiation    Medication: GABAPENTIN 100 MG PO CAPS  Insurance Company: [employer] Non-Specialty PA's - Phone [phone] Fax [phone]  Pharmacy Filling the Rx: [id number] [place], [place] - [address] AT [facility] [address]  Filling Pharmacy Phone:    Filling Pharmacy Fax:    Start Date: 4/11/2024

## 2024-04-17 NOTE — TELEPHONE ENCOUNTER
PRIOR AUTHORIZATION DENIED    Medication: GABAPENTIN 100 MG PO CAPS  Insurance Company: [employer] Non-Specialty PA's - Phone [phone] Fax [phone]  Denial Date: 4/17/2024  Denial Reason(s): see below  Appeal Information: see below  Patient Notified: no

## 2024-04-18 ENCOUNTER — MYC MEDICAL ADVICE (OUTPATIENT)
Dept: FAMILY MEDICINE | Facility: CLINIC | Age: 64
End: 2024-04-18
Payer: COMMERCIAL

## 2024-04-22 ENCOUNTER — TELEPHONE (OUTPATIENT)
Dept: FAMILY MEDICINE | Facility: CLINIC | Age: 64
End: 2024-04-22
Payer: COMMERCIAL

## 2024-04-22 ENCOUNTER — TELEPHONE (OUTPATIENT)
Dept: FAMILY MEDICINE | Facility: CLINIC | Age: 64
End: 2024-04-22

## 2024-04-22 ENCOUNTER — VIRTUAL VISIT (OUTPATIENT)
Dept: FAMILY MEDICINE | Facility: CLINIC | Age: 64
End: 2024-04-22
Payer: COMMERCIAL

## 2024-04-22 DIAGNOSIS — U07.1 INFECTION DUE TO 2019 NOVEL CORONAVIRUS: Primary | ICD-10-CM

## 2024-04-22 PROBLEM — G25.81 RESTLESS LEG SYNDROME: Status: ACTIVE | Noted: 2024-04-22

## 2024-04-22 PROBLEM — G62.9 PERIPHERAL POLYNEUROPATHY: Status: ACTIVE | Noted: 2024-04-22

## 2024-04-22 PROCEDURE — 99442 PR PHYSICIAN TELEPHONE EVALUATION 11-20 MIN: CPT | Performed by: FAMILY MEDICINE

## 2024-04-22 NOTE — TELEPHONE ENCOUNTER
Patient has been taking this dosing of gabapentin for years and this works well for her restless legs and neuropathy. I recommend she be able to continue at current dosing.  Magda Stringer PA-C

## 2024-04-22 NOTE — TELEPHONE ENCOUNTER
Call placed to Patient.  Phone visit scheduled with Dr Fernandez for today at 1:45 for covid/ medication.  Laron Collins RN

## 2024-04-22 NOTE — TELEPHONE ENCOUNTER
Reason for Call:  Appointment Request    Patient requesting this type of appt:  tested positive for covid yesterday, was triaged by nurse and told she needs to be seen today or tomorrow by provider    Requested provider: Gala Stringer    Reason patient unable to be scheduled: Not with their preferred provider    When does patient want to be seen/preferred time: Same day or tomorrow      Could we send this information to you in [facility] or would you prefer to receive a phone call?:   Patient would prefer a phone call   Okay to leave a detailed message?: Yes at Cell number on file:    Telephone Information:   Mobile [phone]       Call taken on 4/22/2024 at 9:54 AM by Alexandra Li

## 2024-04-22 NOTE — TELEPHONE ENCOUNTER
RN COVID TREATMENT VISIT  04/22/24      The patient has been triaged and does not require a higher level of care.    Amelia Michel  63 year old  Current weight? 110    Has the patient been seen by a primary care provider at an [facility] or [facility] within the past two years? Yes.   Have you been in close proximity to/do you have a known exposure to a person with a confirmed case of influenza? No.     General treatment eligibility:  Date of positive COVID test (PCR or at home)?  4/21/24    Are you or have you been hospitalized for this COVID-19 infection? No.   Have you received monoclonal antibodies or antiviral treatment for COVID-19 since this positive test? No.   Do you have any of the following conditions that place you at risk of being very sick from COVID-19?   - Age 50 years or older  Yes, patient has at least one high risk condition as noted above.     Current COVID symptoms:   - cough  - fatigue  - muscle or body aches  - headache  - sore throat  - congestion or runny nose  Yes. Patient has at least one symptom as selected.     How many days since symptoms started? 5 days or less. Established patient, 12 years or older weighing at least 88.2 lbs, who has symptoms that started in the past 5 days, has not been hospitalized nor received treatment already, and is at risk for being very sick from COVID-19.     Treatment eligibility by RN:  Are you currently pregnant or nursing? No  Do you have a clinically significant hypersensitivity to nirmatrelvir or ritonavir, or toxic epidermal necrolysis (TEN) or Saha-Jesse Syndrome? No  Do you have a history of hepatitis, any hepatic impairment on the Problem List (such as Child-Garnica Class C, cirrhosis, fatty liver disease, alcoholic liver disease), or was the last liver lab (hepatic panel, ALT, AST, ALK Phos, bilirubin) elevated in the past 6 months? Yes she has a history of elevated live labs in the past.  Do you have any history  of severe renal impairment (eGFR < 30mL/min)? No    Is patient eligible to continue? Yes, patient meets all eligibility requirements for the RN COVID treatment (as denoted by all no responses above).     Current Outpatient Medications   Medication Sig Dispense Refill    acetaminophen (TYLENOL) 325 MG tablet Take 3 tablets (975 mg) by mouth every 8 hours      alendronate (FOSAMAX) 70 MG tablet Take 1 tablet (70 mg) by mouth every 7 days (Patient taking differently: Take 70 mg by mouth every 7 days Mondays) 12 tablet 3    apixaban ANTICOAGULANT (ELIQUIS ANTICOAGULANT) 5 MG tablet Take 1 tablet (5 mg) by mouth 2 times daily 180 tablet 3    aspirin (ASA) 81 MG chewable tablet 1 tablet (81 mg) by Oral or NG Tube route daily 30 tablet 2    calcium carbonate 600 mg-vitamin D 400 units (CALTRATE) 600-400 MG-UNIT per tablet Take 2 tablets by mouth daily      diltiazem ER (DILT-XR) 240 MG 24 hr ER beaded capsule Take 1 capsule (240 mg) by mouth daily 90 capsule 3    docusate sodium (COLACE) 100 MG capsule Take 100 mg by mouth 2 times daily as needed for constipation      empagliflozin (JARDIANCE) 10 MG TABS tablet Take 1 tablet (10 mg) by mouth daily (Patient taking differently: Take 10 mg by mouth daily Stopping 1/20/24 before surgery) 90 tablet 3    gabapentin (NEURONTIN) 100 MG capsule Take 100 mg by mouth 2 times daily AM and Afternoon      gabapentin (NEURONTIN) 100 MG capsule Take 200 mg by mouth at bedtime      hydroxychloroquine (PLAQUENIL) 200 MG tablet Take 1 tablet (200 mg) by mouth daily 90 tablet 1    loratadine (CLARITIN) 10 MG tablet Take 10 mg by mouth daily as needed      magnesium oxide 200 MG TABS Take 200 mg by mouth daily bedtime      multivitamin, therapeutic with minerals (THERA-VIT-M) TABS tablet Take 1 tablet by mouth daily (Patient taking differently: Take 1 tablet by mouth daily Stopping 1/17/24 before surgery) 30 each 0    oxyCODONE (ROXICODONE) 5 MG tablet Take 1-2 tablets (5-10 mg) by mouth every  4 hours as needed for moderate pain (Patient not taking: Reported on 3/22/2024) 25 tablet 0    potassium chloride timmy ER (KLOR-CON M20) 20 MEQ CR tablet Take 3 tablets (60 mEq) by mouth daily 270 tablet 3    predniSONE (DELTASONE) 1 MG tablet Take 3 tablets (3 mg) by mouth daily - Oral 270 tablet 1    senna-docusate (SENOKOT-S/PERICOLACE) 8.6-50 MG tablet Take 1 tablet by mouth 2 times daily as needed for constipation 25 tablet 0    spironolactone (ALDACTONE) 25 MG tablet Take 0.5 tablets (12.5 mg) by mouth daily 90 tablet 1    torsemide (DEMADEX) 20 MG tablet Take 1 tablet (20 mg) by mouth daily 90 tablet 3    Vitamin D (Cholecalciferol) 10 MCG (400 UNIT) TABS Take 1 tablet by mouth daily         Medications from List 1 of the standing order (on medications that exclude the use of Paxlovid) that patient is taking:     Is patient taking Julissa's Wort? No  Is patient taking Julissa's Wort or any meds from List 1? No.   Medications from List 2 of the standing order (on meds that provider needs to adjust) that patient is taking: apixaban (Eliquis), explained a provider visit is necessary to discuss medication adjustments while taking Paxlovid.  diltiazem (Cardizem, Tiazac), explained a provider visit is necessary to discuss medication adjustments while taking Paxlovid.  oral corticosteriods, explained a provider visit is necessary to discuss medication adjustments while taking Paxlovid.   Is patient on any of the meds from List 2? Yes. Patient will be scheduled or transferred to a  at the end of this call.   Alexandra Brady RN

## 2024-04-22 NOTE — PROGRESS NOTES
Amelia is a 63 year old who is being evaluated via a billable telephone visit.    What phone number would you like to be contacted at? [phone]  How would you like to obtain your AVS? Queenie  Originating Location (pt. Location): Home    Distant Location (provider location):  On-site    2:00 PM    A/P:      ICD-10-CM    1. Infection due to 2019 novel coronavirus  U07.1         Reviewed with pt her risk factors for severe disease and protective aspect of vaccines.  Discussed paxlovid and interactions with various medications, for her most notably the apixaban and the diltiazem.  Reviewed that we would need to decrease the apixiban dose by 50% due to increased bleeding risk.  Duration of decrease unclear, might be up to 8 days.  Also reviewed need to decreased diltiazem by 50% for 8 days.    Pt reluctant to make medicine changes, especially as she is feeling better.    Plans to hold off for now, will reconsider if feeling worse tomorrow.    Reviewed need to start therapy within 5 days form symptoms onset if she opts to pursue.  She verbalized understanding.         Subjective   Amelia is a 63 year old, presenting for the following health issues:  Covid Concern        4/22/2024     1:16 PM   Additional Questions   Roomed by Harmony DUNAWAY   Accompanied by Self      History of Present Illness       Reason for visit:  Positive COVID  Symptom onset:  1-3 days ago  Symptoms include:  Body aches sore throat headache  Symptom intensity:  Mild  Symptom progression:  Improving  Had these symptoms before:  No  What makes it worse:  No  What makes it better:  No    Amelia eats 4 or more servings of fruits and vegetables daily.Amelia consumes 0 sweetened beverage(s) daily.Amelia exercises with enough effort to increase Amelia's heart rate 9 or less minutes per day.  Amelia exercises with enough effort to increase Amelia's heart rate 3 or less days per week.   Amelia is taking medications regularly.         COVID-19  Symptom Review  How many days ago did these symptoms start? 3 days     Are any of the following symptoms significant for you?  New or worsening difficulty breathing? No  Worsening cough? Yes, it's a dry cough.   Fever or chills? No  Headache: YES  Sore throat: YES  Chest pain: No  Diarrhea: No  Body aches? YES      What treatments has patient tried? Acetaminophen   Does patient live in a nursing home, group home, or shelter? No  Does patient have a way to get food/medications during quarantined? Yes, I have a friend or family member who can help me.    Symptoms started on Saturday.  Developed sore throat in the afternoon.  Sunday was very fatigued and had a lot of body aches.  Has also had runny nose intermittently.  Has had a dry cough.  No SOB, no fever and O2 sat 99%    Notes she is actually feeling better today then yesterday, seems ot be improving.    This is her first covid illness.  Has been fully vaccinated.             Objective           Vitals:  No vitals were obtained today due to virtual visit.    Physical Exam   General: Alert and no distress //Respiratory: No audible wheeze, cough, or shortness of breath // Psychiatric:  Appropriate affect, tone, and pace of words      Epic reviewed      2:13 PM    Phone call duration: 13 minutes  Signed Electronically by: Stephanie Fernandez DO

## 2024-04-24 ENCOUNTER — MYC REFILL (OUTPATIENT)
Dept: RHEUMATOLOGY | Facility: CLINIC | Age: 64
End: 2024-04-24
Payer: COMMERCIAL

## 2024-04-24 DIAGNOSIS — T38.0X5A STEROID-INDUCED OSTEOPOROSIS: ICD-10-CM

## 2024-04-24 DIAGNOSIS — M05.79 RHEUMATOID ARTHRITIS INVOLVING MULTIPLE SITES WITH POSITIVE RHEUMATOID FACTOR (H): ICD-10-CM

## 2024-04-24 DIAGNOSIS — M81.8 STEROID-INDUCED OSTEOPOROSIS: ICD-10-CM

## 2024-04-25 RX ORDER — HYDROXYCHLOROQUINE SULFATE 200 MG/1
200 TABLET, FILM COATED ORAL DAILY
Qty: 90 TABLET | Refills: 2 | Status: SHIPPED | OUTPATIENT
Start: 2024-04-25 | End: 2024-06-28

## 2024-04-25 RX ORDER — PREDNISONE 1 MG/1
TABLET ORAL
Qty: 270 TABLET | Refills: 1 | Status: SHIPPED | OUTPATIENT
Start: 2024-04-25

## 2024-04-25 NOTE — TELEPHONE ENCOUNTER
Medication Appeal Initiation    Medication: GABAPENTIN 100 MG PO CAPS  Appeal Start Date:  4/25/2024  Insurance Company: [employer]  Insurance Phone: [phone]  Insurance Fax:[phone]  Comments:

## 2024-04-25 NOTE — TELEPHONE ENCOUNTER
hydroxychloroquine (PLAQUENIL) 200 MG tablet   Disp-90 tablet, R-1,   Start: 09/21/2023     Creatinine   Date Value Ref Range Status   03/22/2024 1.02 0.51 - 1.17 mg/dL Final     Comment:     Male and Female  0-2 Months    0.31-0.88 mg/dL  2-12 Months   0.16-0.39 mg/dL  1-2 Years     0.18-0.35 mg/dL  3-4 Years     0.26-0.42 mg/dL  5-6 Years     0.29-0.47 mg/dL  7-8 Years     0.34-0.53 mg/dL  9-10 Years    0.33-0.64 mg/dL  11-12 Years   0.44-0.68 mg/dL  13-14 Years   0.46-0.77 mg/dL    Female  15 Years and older  0.51-0.95 mg/dL    Male  15 Years and older  0.67-1.17 mg/dL       06/23/2021 1.05 (H) 0.52 - 1.04 mg/dL Final      Eye exam 1/10/24    predniSONE (DELTASONE) 1 MG tablet   Disp-270 tablet, R-1     9/21/2023  [facility] Maribell Loco MD  Rheumatology

## 2024-04-30 NOTE — TELEPHONE ENCOUNTER
Per insurance no appeal on file. Resending again to insurance will check on status again tomorrow

## 2024-05-01 NOTE — TELEPHONE ENCOUNTER
MEDICATION APPEAL APPROVED    Medication: GABAPENTIN 100 MG PO CAPS  Authorization Effective Date: 4/16/2024  Authorization Expiration Date: 4/30/2025  Approved Dose/Quantity: ud  Reference #:     Appeal Insurance Company: [employer]  Expected CoPay: REFILL TOO SOON     CoPay Card Available: No  Financial Assistance Needed: n/a  Filling Pharmacy: [id number] [place], [place] - [address] AT [facility] [address]  Patient Notified: no  Comments:

## 2024-05-03 ENCOUNTER — ANCILLARY PROCEDURE (OUTPATIENT)
Dept: CARDIOLOGY | Facility: CLINIC | Age: 64
End: 2024-05-03
Attending: INTERNAL MEDICINE
Payer: COMMERCIAL

## 2024-05-03 DIAGNOSIS — R00.1 BRADYCARDIA: ICD-10-CM

## 2024-05-03 PROCEDURE — 93296 REM INTERROG EVL PM/IDS: CPT

## 2024-05-03 PROCEDURE — 93294 REM INTERROG EVL PM/LDLS PM: CPT | Performed by: INTERNAL MEDICINE

## 2024-05-08 LAB
MDC_IDC_EPISODE_DTM: NORMAL
MDC_IDC_EPISODE_DURATION: 1 S
MDC_IDC_EPISODE_DURATION: 1009 S
MDC_IDC_EPISODE_DURATION: 1130 S
MDC_IDC_EPISODE_DURATION: 1331 S
MDC_IDC_EPISODE_DURATION: 1555 S
MDC_IDC_EPISODE_DURATION: 1720 S
MDC_IDC_EPISODE_DURATION: 180 S
MDC_IDC_EPISODE_DURATION: 180 S
MDC_IDC_EPISODE_DURATION: 181 S
MDC_IDC_EPISODE_DURATION: 183 S
MDC_IDC_EPISODE_DURATION: 183 S
MDC_IDC_EPISODE_DURATION: 185 S
MDC_IDC_EPISODE_DURATION: 186 S
MDC_IDC_EPISODE_DURATION: 2 S
MDC_IDC_EPISODE_DURATION: 2092 S
MDC_IDC_EPISODE_DURATION: 28 S
MDC_IDC_EPISODE_DURATION: 28 S
MDC_IDC_EPISODE_DURATION: 3 S
MDC_IDC_EPISODE_DURATION: 3157 S
MDC_IDC_EPISODE_DURATION: 354 S
MDC_IDC_EPISODE_DURATION: 373 S
MDC_IDC_EPISODE_DURATION: 38 S
MDC_IDC_EPISODE_DURATION: 3989 S
MDC_IDC_EPISODE_DURATION: 440 S
MDC_IDC_EPISODE_DURATION: 4945 S
MDC_IDC_EPISODE_DURATION: 51 S
MDC_IDC_EPISODE_DURATION: 5139 S
MDC_IDC_EPISODE_DURATION: 5546 S
MDC_IDC_EPISODE_DURATION: 6210 S
MDC_IDC_EPISODE_DURATION: 626 S
MDC_IDC_EPISODE_DURATION: 6633 S
MDC_IDC_EPISODE_DURATION: 7489 S
MDC_IDC_EPISODE_DURATION: 759 S
MDC_IDC_EPISODE_DURATION: 80 S
MDC_IDC_EPISODE_DURATION: 8545 S
MDC_IDC_EPISODE_DURATION: 99 S
MDC_IDC_EPISODE_DURATION: NORMAL S
MDC_IDC_EPISODE_ID: NORMAL
MDC_IDC_EPISODE_TYPE: NORMAL
MDC_IDC_LEAD_CONNECTION_STATUS: NORMAL
MDC_IDC_LEAD_CONNECTION_STATUS: NORMAL
MDC_IDC_LEAD_IMPLANT_DT: NORMAL
MDC_IDC_LEAD_IMPLANT_DT: NORMAL
MDC_IDC_LEAD_LOCATION: NORMAL
MDC_IDC_LEAD_LOCATION: NORMAL
MDC_IDC_LEAD_LOCATION_DETAIL_1: NORMAL
MDC_IDC_LEAD_LOCATION_DETAIL_1: NORMAL
MDC_IDC_LEAD_MFG: NORMAL
MDC_IDC_LEAD_MFG: NORMAL
MDC_IDC_LEAD_MODEL: NORMAL
MDC_IDC_LEAD_MODEL: NORMAL
MDC_IDC_LEAD_POLARITY_TYPE: NORMAL
MDC_IDC_LEAD_POLARITY_TYPE: NORMAL
MDC_IDC_LEAD_SERIAL: NORMAL
MDC_IDC_LEAD_SERIAL: NORMAL
MDC_IDC_LEAD_SPECIAL_FUNCTION: NORMAL
MDC_IDC_LEAD_SPECIAL_FUNCTION: NORMAL
MDC_IDC_MSMT_BATTERY_DTM: NORMAL
MDC_IDC_MSMT_BATTERY_REMAINING_LONGEVITY: 110 MO
MDC_IDC_MSMT_BATTERY_RRT_TRIGGER: 2.62
MDC_IDC_MSMT_BATTERY_STATUS: NORMAL
MDC_IDC_MSMT_BATTERY_VOLTAGE: 2.99 V
MDC_IDC_MSMT_LEADCHNL_RA_IMPEDANCE_VALUE: 285 OHM
MDC_IDC_MSMT_LEADCHNL_RA_IMPEDANCE_VALUE: 399 OHM
MDC_IDC_MSMT_LEADCHNL_RA_PACING_THRESHOLD_AMPLITUDE: 0.5 V
MDC_IDC_MSMT_LEADCHNL_RA_PACING_THRESHOLD_PULSEWIDTH: 0.4 MS
MDC_IDC_MSMT_LEADCHNL_RA_SENSING_INTR_AMPL: 2.38 MV
MDC_IDC_MSMT_LEADCHNL_RV_IMPEDANCE_VALUE: 399 OHM
MDC_IDC_MSMT_LEADCHNL_RV_IMPEDANCE_VALUE: 494 OHM
MDC_IDC_MSMT_LEADCHNL_RV_PACING_THRESHOLD_AMPLITUDE: 0.62 V
MDC_IDC_MSMT_LEADCHNL_RV_PACING_THRESHOLD_PULSEWIDTH: 0.4 MS
MDC_IDC_MSMT_LEADCHNL_RV_SENSING_INTR_AMPL: 16.5 MV
MDC_IDC_PG_IMPLANT_DTM: NORMAL
MDC_IDC_PG_MFG: NORMAL
MDC_IDC_PG_MODEL: NORMAL
MDC_IDC_PG_SERIAL: NORMAL
MDC_IDC_PG_TYPE: NORMAL
MDC_IDC_SESS_CLINIC_NAME: NORMAL
MDC_IDC_SESS_DTM: NORMAL
MDC_IDC_SESS_TYPE: NORMAL
MDC_IDC_SET_BRADY_AT_MODE_SWITCH_RATE: 171 {BEATS}/MIN
MDC_IDC_SET_BRADY_HYSTRATE: NORMAL
MDC_IDC_SET_BRADY_LOWRATE: 75 {BEATS}/MIN
MDC_IDC_SET_BRADY_MAX_SENSOR_RATE: 130 {BEATS}/MIN
MDC_IDC_SET_BRADY_MAX_TRACKING_RATE: 130 {BEATS}/MIN
MDC_IDC_SET_BRADY_MODE: NORMAL
MDC_IDC_SET_BRADY_PAV_DELAY_LOW: 180 MS
MDC_IDC_SET_BRADY_SAV_DELAY_LOW: 150 MS
MDC_IDC_SET_LEADCHNL_RA_PACING_AMPLITUDE: 1.5 V
MDC_IDC_SET_LEADCHNL_RA_PACING_ANODE_ELECTRODE_1: NORMAL
MDC_IDC_SET_LEADCHNL_RA_PACING_ANODE_LOCATION_1: NORMAL
MDC_IDC_SET_LEADCHNL_RA_PACING_CAPTURE_MODE: NORMAL
MDC_IDC_SET_LEADCHNL_RA_PACING_CATHODE_ELECTRODE_1: NORMAL
MDC_IDC_SET_LEADCHNL_RA_PACING_CATHODE_LOCATION_1: NORMAL
MDC_IDC_SET_LEADCHNL_RA_PACING_POLARITY: NORMAL
MDC_IDC_SET_LEADCHNL_RA_PACING_PULSEWIDTH: 0.4 MS
MDC_IDC_SET_LEADCHNL_RA_SENSING_ANODE_ELECTRODE_1: NORMAL
MDC_IDC_SET_LEADCHNL_RA_SENSING_ANODE_LOCATION_1: NORMAL
MDC_IDC_SET_LEADCHNL_RA_SENSING_CATHODE_ELECTRODE_1: NORMAL
MDC_IDC_SET_LEADCHNL_RA_SENSING_CATHODE_LOCATION_1: NORMAL
MDC_IDC_SET_LEADCHNL_RA_SENSING_POLARITY: NORMAL
MDC_IDC_SET_LEADCHNL_RA_SENSING_SENSITIVITY: 0.3 MV
MDC_IDC_SET_LEADCHNL_RV_PACING_AMPLITUDE: 2 V
MDC_IDC_SET_LEADCHNL_RV_PACING_ANODE_ELECTRODE_1: NORMAL
MDC_IDC_SET_LEADCHNL_RV_PACING_ANODE_LOCATION_1: NORMAL
MDC_IDC_SET_LEADCHNL_RV_PACING_CAPTURE_MODE: NORMAL
MDC_IDC_SET_LEADCHNL_RV_PACING_CATHODE_ELECTRODE_1: NORMAL
MDC_IDC_SET_LEADCHNL_RV_PACING_CATHODE_LOCATION_1: NORMAL
MDC_IDC_SET_LEADCHNL_RV_PACING_POLARITY: NORMAL
MDC_IDC_SET_LEADCHNL_RV_PACING_PULSEWIDTH: 0.4 MS
MDC_IDC_SET_LEADCHNL_RV_SENSING_ANODE_ELECTRODE_1: NORMAL
MDC_IDC_SET_LEADCHNL_RV_SENSING_ANODE_LOCATION_1: NORMAL
MDC_IDC_SET_LEADCHNL_RV_SENSING_CATHODE_ELECTRODE_1: NORMAL
MDC_IDC_SET_LEADCHNL_RV_SENSING_CATHODE_LOCATION_1: NORMAL
MDC_IDC_SET_LEADCHNL_RV_SENSING_POLARITY: NORMAL
MDC_IDC_SET_LEADCHNL_RV_SENSING_SENSITIVITY: 0.9 MV
MDC_IDC_SET_ZONE_DETECTION_INTERVAL: 350 MS
MDC_IDC_SET_ZONE_DETECTION_INTERVAL: 400 MS
MDC_IDC_SET_ZONE_STATUS: NORMAL
MDC_IDC_SET_ZONE_STATUS: NORMAL
MDC_IDC_SET_ZONE_TYPE: NORMAL
MDC_IDC_SET_ZONE_VENDOR_TYPE: NORMAL
MDC_IDC_STAT_AT_BURDEN_PERCENT: 7 %
MDC_IDC_STAT_AT_DTM_END: NORMAL
MDC_IDC_STAT_AT_DTM_START: NORMAL
MDC_IDC_STAT_BRADY_AP_VP_PERCENT: 81.55 %
MDC_IDC_STAT_BRADY_AP_VS_PERCENT: 0.31 %
MDC_IDC_STAT_BRADY_AS_VP_PERCENT: 16.42 %
MDC_IDC_STAT_BRADY_AS_VS_PERCENT: 1.72 %
MDC_IDC_STAT_BRADY_DTM_END: NORMAL
MDC_IDC_STAT_BRADY_DTM_START: NORMAL
MDC_IDC_STAT_BRADY_RA_PERCENT_PACED: 77.03 %
MDC_IDC_STAT_BRADY_RV_PERCENT_PACED: 97.81 %
MDC_IDC_STAT_EPISODE_RECENT_COUNT: 0
MDC_IDC_STAT_EPISODE_RECENT_COUNT: 125
MDC_IDC_STAT_EPISODE_RECENT_COUNT: 15
MDC_IDC_STAT_EPISODE_RECENT_COUNT_DTM_END: NORMAL
MDC_IDC_STAT_EPISODE_RECENT_COUNT_DTM_START: NORMAL
MDC_IDC_STAT_EPISODE_TOTAL_COUNT: 0
MDC_IDC_STAT_EPISODE_TOTAL_COUNT: 401
MDC_IDC_STAT_EPISODE_TOTAL_COUNT: 5332
MDC_IDC_STAT_EPISODE_TOTAL_COUNT: 815
MDC_IDC_STAT_EPISODE_TOTAL_COUNT: NORMAL
MDC_IDC_STAT_EPISODE_TOTAL_COUNT_DTM_END: NORMAL
MDC_IDC_STAT_EPISODE_TOTAL_COUNT_DTM_START: NORMAL
MDC_IDC_STAT_EPISODE_TYPE: NORMAL
MDC_IDC_STAT_TACHYTHERAPY_RECENT_DTM_END: NORMAL
MDC_IDC_STAT_TACHYTHERAPY_RECENT_DTM_START: NORMAL
MDC_IDC_STAT_TACHYTHERAPY_TOTAL_DTM_END: NORMAL
MDC_IDC_STAT_TACHYTHERAPY_TOTAL_DTM_START: NORMAL

## 2024-05-08 NOTE — NURSING NOTE
"Chief Complaint   Patient presents with     ER F/U       Initial /89 (BP Location: Right arm, Patient Position: Sitting, Cuff Size: Adult Regular)  Pulse 55  Temp 97.5  F (36.4  C) (Tympanic)  Wt 130 lb (59 kg)  BMI 27.17 kg/m2 Estimated body mass index is 27.17 kg/(m^2) as calculated from the following:    Height as of 3/2/18: 4' 10\" (1.473 m).    Weight as of this encounter: 130 lb (59 kg).  Medication Reconciliation: complete   Coleen Shoemaker CMA  " 2 = A lot of assistance

## 2024-05-14 ENCOUNTER — MYC REFILL (OUTPATIENT)
Dept: CARDIOLOGY | Facility: CLINIC | Age: 64
End: 2024-05-14
Payer: COMMERCIAL

## 2024-05-14 DIAGNOSIS — E87.6 HYPOKALEMIA: ICD-10-CM

## 2024-05-14 DIAGNOSIS — I50.30 HEART FAILURE WITH PRESERVED EJECTION FRACTION, NYHA CLASS I (H): ICD-10-CM

## 2024-05-14 RX ORDER — POTASSIUM CHLORIDE 1500 MG/1
60 TABLET, EXTENDED RELEASE ORAL DAILY
Qty: 270 TABLET | Refills: 3 | Status: CANCELLED | OUTPATIENT
Start: 2024-05-14

## 2024-05-14 NOTE — TELEPHONE ENCOUNTER
potassium chloride timmy ER (KLOR-CON M20) 20 MEQ CR tablet: refills on file  - Rx sent 3/22/2024  Disp:270 R:3  - message sent to patient

## 2024-05-23 DIAGNOSIS — I47.19 ATRIAL TACHYCARDIA (H): ICD-10-CM

## 2024-05-23 RX ORDER — DILTIAZEM HYDROCHLORIDE 240 MG/1
240 CAPSULE, EXTENDED RELEASE ORAL DAILY
Qty: 90 CAPSULE | Refills: 2 | Status: SHIPPED | OUTPATIENT
Start: 2024-05-23 | End: 2024-10-02

## 2024-05-24 NOTE — TELEPHONE ENCOUNTER
Prescription approved per [facility] Refill Protocol.    Requested Prescriptions   Pending Prescriptions Disp Refills    DILT- MG 24 hr ER beaded capsule [Pharmacy Med Name: DILTIAZEM XR 240MG CAPSULES (24 HR)] 90 capsule 2     Sig: TAKE 1 CAPSULE(240 MG) BY MOUTH DAILY       Calcium Channel Blockers Protocol  Passed - 5/23/2024  8:01 AM        Passed - Blood pressure under 140/90 in past 12 months     BP Readings from Last 3 Encounters:   03/22/24 135/83   02/02/24 122/73   01/25/24 115/61       No data recorded            Passed - Normal ALT in past 12 months     Recent Labs   Lab Test 03/22/24  1500   ALT 21             Passed - Medication is active on med list        Passed - GFR is on file in the past 12 months and most recent GFR is normal        Passed - Recent (12 mo) or future (90 days) visit within the authorizing provider's specialty     The patient must have completed an in-person or virtual visit within the past 12 months or has a future visit scheduled within the next 90 days with the authorizing provider s specialty.  Urgent care and e-visits do not quality as an office visit for this protocol.          Passed - Patient is age 18 or older        Passed - No active pregnancy on record        Passed - No positive pregnancy test in past 12 months           Whit Canales RN   [facility]

## 2024-05-29 ENCOUNTER — MYC MEDICAL ADVICE (OUTPATIENT)
Dept: FAMILY MEDICINE | Facility: CLINIC | Age: 64
End: 2024-05-29
Payer: COMMERCIAL

## 2024-05-29 DIAGNOSIS — I34.0 SEVERE MITRAL INSUFFICIENCY: ICD-10-CM

## 2024-05-29 DIAGNOSIS — Z95.4 S/P TVR (TRICUSPID VALVE REPLACEMENT): ICD-10-CM

## 2024-05-29 RX ORDER — SULFAMETHOXAZOLE/TRIMETHOPRIM 800-160 MG
1 TABLET ORAL 2 TIMES DAILY
Qty: 4 TABLET | Refills: 5 | Status: SHIPPED | OUTPATIENT
Start: 2024-05-29

## 2024-06-28 ENCOUNTER — OFFICE VISIT (OUTPATIENT)
Dept: RHEUMATOLOGY | Facility: CLINIC | Age: 64
End: 2024-06-28
Attending: INTERNAL MEDICINE
Payer: COMMERCIAL

## 2024-06-28 VITALS
HEART RATE: 129 BPM | OXYGEN SATURATION: 97 % | BODY MASS INDEX: 23.4 KG/M2 | DIASTOLIC BLOOD PRESSURE: 77 MMHG | WEIGHT: 113 LBS | SYSTOLIC BLOOD PRESSURE: 110 MMHG

## 2024-06-28 DIAGNOSIS — T38.0X5A STEROID-INDUCED OSTEOPOROSIS: ICD-10-CM

## 2024-06-28 DIAGNOSIS — M05.79 RHEUMATOID ARTHRITIS INVOLVING MULTIPLE SITES WITH POSITIVE RHEUMATOID FACTOR (H): ICD-10-CM

## 2024-06-28 DIAGNOSIS — M81.8 STEROID-INDUCED OSTEOPOROSIS: ICD-10-CM

## 2024-06-28 DIAGNOSIS — Z51.81 ENCOUNTER FOR MEDICATION MONITORING: ICD-10-CM

## 2024-06-28 DIAGNOSIS — Z79.899 LONG-TERM USE OF PLAQUENIL: Primary | ICD-10-CM

## 2024-06-28 PROCEDURE — G2211 COMPLEX E/M VISIT ADD ON: HCPCS | Performed by: INTERNAL MEDICINE

## 2024-06-28 PROCEDURE — 99215 OFFICE O/P EST HI 40 MIN: CPT | Performed by: INTERNAL MEDICINE

## 2024-06-28 PROCEDURE — 99213 OFFICE O/P EST LOW 20 MIN: CPT | Performed by: INTERNAL MEDICINE

## 2024-06-28 RX ORDER — HYDROXYCHLOROQUINE SULFATE 200 MG/1
200 TABLET, FILM COATED ORAL DAILY
Qty: 90 TABLET | Refills: 1 | Status: SHIPPED | OUTPATIENT
Start: 2024-06-28

## 2024-06-28 RX ORDER — TOCILIZUMAB 180 MG/ML
162 INJECTION, SOLUTION SUBCUTANEOUS
Qty: 1.8 ML | Refills: 3 | Status: SHIPPED | OUTPATIENT
Start: 2024-06-28

## 2024-06-28 ASSESSMENT — PAIN SCALES - GENERAL: PAINLEVEL: SEVERE PAIN (6)

## 2024-06-28 NOTE — PROGRESS NOTES
Last seen: 9/21/2023    DOS: 6/28/2024    Reason for in person follow up visit: RA    [facility] - Rheumatology Clinic Visit    Amelia Michel  is a 61 year old here for rheumatoid arthritis (RA) dx 35 y/o. +CCP >331 -RF -KALYAN panel. 5-2108 XR 5-2018 DDD and facet osteoarthritis, congential fusion C2-3) prednisone since dx at 35 y/o, hydroxychloroquine (plaquenil) long-time since dx tapered by 200 mg 9-2019. Lymphoma in liver, heart, bone and mass between esophagus, left pelvic, right ankle.     Review-Dr. Dumas in [facility] for many years. She had relatively inactive disease prior to her arrest but was on methotrexate 10 mg PO per week, Arava 10 mg daily, Plaquenil 200 mg twice per day, and prednisone 3 mg daily. She has had partial fusion of her right wrist. After discharge from the hospital she was put on prednisone 5 mg daily as monotherapy. Sulfasalazine -not effective long-time, initial alan but resolved after retried. Past Dr. Cunha and Dr. 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 Treated concervatively with plaquenil and 5mg prednisone, and not interested in rituximab or other biologics. She has previously been treated with sulfasalazine and initially developed a rash which was initially thought to be a sulfa rash, but her rash resolved and did not reoccur after being placed back on sulfasalazine. She was also treated with Arava, but this discontinued during her cancer treatment. She feels that these drugs have had no effect on her symptoms. Etanercept (enbrel) in past no benefits.     March 10, 2021  Denies any fever, chills, SOB, MONTALVO, night sweats, or chest pain, high fever, cough, travel in last 14 days or exposure to covid-19 (coronavirus). Reports healthy. Follows CDC guidelines. Sinus issues for a week other then that fine. Not out tho either. Will be getting  "vaccine signing in now.     Arthritis is hand bothering her. Left worse, left hand neuropathy from antecubital IV reaction \"take out muscle and fat\". Its her thumb gets her issues. Hurts all the time.  Not swelling. Base of thumb some tingling. The same. With her history cardiac arrest and chemo does have splint, does not need OT. Walking now and mentally doing good.      Right ankle triple arthrodesis Oct 2020, learning how to walk properly. \"much better\".  Doing more more active.     Fosamax 70 mg every 7 days restarted 9-2020 per Dr. Maribell Domínguez, was on with PCP  In past \"not concerned on for too long\" will need future time off of this was off for 18 month. Prednisone 5 mg once day chronically, hydroxychloroquine (plaquenil) 200 mg once day -eye exam in Dec 2020 early catarct          9/9/2021: Ms. Michel is doing fairly well with no major complaints or RA flare ups on  mg qd+predniosne 5 mg every day. Eye exam is updated. Fully vaccinated. PCP told her to hold fosamax till further discussion in Oct.      Has pain over palms. This gets worse throughout the day. No AM stiffness. Does more chores around the house which is a trigger.     1st covid vaccine caused body ache, second dose caused fatigue.    3/9/2022:    No joint swelling, has edema due HF, joints hurt, warm shower helps, lost strength, has stiffness all day.    Eye exam is due 1/2023    Off pred since 3/3 .    9/16/2022:    Amelia is here for follow up. Has pain over R foot, is back on PT,     Has problem with fine motor, it's hard to hold a plate.    Has SOB on and off due to Afib, CHF. BP is up. Seeing cardiology.    No am stiffness.    No oral ulcers.    No rash.    Back on pred 3 mg every day.    Back on fosamax.      09/21/2023    Overall reports feeling more pain .   - She has a new sharp left buttock pain radiating to the left thigh and stopping at the knee x3 months. No specific exacerbating or relieving factors.   - She reports " progressively worsening right ankle pain. In 2020 this joint was surgically fused and since then has progressively inverted and affecting her gait. She is now having increasing right ankle, knee, and hip pain.   - Chronic left knee pain worsening over time. She has had 3 joints injections in the knee but without significant improvement.    - States hands have been more crampy. She denies redness/heat/swelling. States the chronic deformities and cramping affects her dexterity and worsens her ability to complete some activities like cooking    Last saw oncology 9/14 with good report    Current medications include Plaquenil and Prednisone 3mg. Tylenol occasionally but no relief. Last eye exam 01/2023, next already scheduled for 01/2024.     At this time interested in exploring other medications due to increased pain. Questions if pain management may be beneficial as well.   - Currently on Fosamax, start Reclast on Monday    ROS   + dry itchy eyes and itchy ears (improves with claritin)   - No fevers, weight loss, night sweats, chest pain, dyspnea, cough, N/V/D, eye pain or vision changes, nail changes, mouth sores, raynauds,       Today 6/28/2024:      -had L hip arthroplasty on 1/24/2024 after fracture, recovered    -has neuropathy, hard to do things with neuropathy    -pain mx did not help with joint pain    -hands are stiff more at night, wakes her up    -MCPs are hurting at palmar side, more on L side    -had R ankle arthrodesis, wears a brace pain on and off. Does exercise and does massage tx for tony horse pain    -no joint swelling    -her plaquenil dose is 1 tab of 200 mg a day      ROS: A comprehensive ROS was done. Positives are per HPI.      PMH:  Injury-left thumb amputation, left finger broke  Medical-  Antiplatelet or antithrombotic long-term use  Arrhythmia  Atrial tachycardia, flutter  Paroxysmal atrial fibrillation  Arthritis  Lymphoma  Cardiac arrest  history of chemotherapy  Primary pulmonary  "hypertension  Neuropathy  Postoperative nausea and vomiting  Rheumatoid arthritis  Hyperlipidemia LDL goal <130  Lymphedema of both lower extremities  Cardiogenic shock and cardiac arrest 5/2017  Acute respiratory failure with hypoxia  Critical illness myopathy  Sepsis  Wound of left upper extremity  Diffuse large B-cell lymphoma of extranodal site  Febrile neutropenia  Physical deconditioning  Palpitations  Osteoporosis  -DEXA 2018   Slowly healing wound in medial left antecubital fossa after traumatic IV  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, latter felt from lymphoma   Neuropathy of right foot with weakness after intrathecal cytarabine  perioditis   Right arm PICC blood clots during cancer treatments  Right shoulder effusion when had pericarditis       Past Surgical History:  Anesthesia cardioversion  Right wrist arthrodesis, partial fusion \"history migrated out\"  Bone marrow aspirate & biopsy  Excise lesion upper extremity - left wound excision and closure, possible submuscular transposition of ulnar nerve  Foot surgery - 4 left, 2 right  Carpal tunnel bilateral release  Repair ventricular septal defect  Transpositional ulnar nerve (elbow) left   Right shoulder effusion history     Family History:   No autoimmune disorders, psoriasis, UC, crohn's, SLE, RA, PsA, gout, autoimmune thyroid.  No MS, heart disease in family  Mother - breast cancer, hypertension, alzheimer disease-passed   Father - hypertension, lymphoma, circulatory A fib-passed  Paternal grandmother - diabetes  Siblings-younger sister heatlhy PB, younger brother think arthritis not dx PB     Social History:   No smoking or tobacco use. No alcohol use. No IVDU. None Children. Right handed. . Disability          ROS:    A comprehensive ROS was done. Positives are per HPI.      Ph. E:    /77   Pulse (!) 129   Wt 51.3 kg (113 lb)   SpO2 97%   BMI 23.40 kg/m      Constitutional: NAD, pleasant, ambulates " with a cane  Eyes: Normal EOM, conjunctiva, sclera  ENT: no oral ulcers  Neck: No mass or thyroid enlargement  Chest: old sternotomy scar, pacemaker implanted on left chest wall, good perfusion. CTAB  CV: no M/R/G, irregular HR (Afib)  Lymph: No cervical, supraclavicular nodes  MS: +joint tenderness over L MCPs, swelling over palmar side of L MCPs. Chronic RA and surgical deformities. R ankle in brace.  Skin: No rash  Neuro: grossly non focal  Psych: Normal affect.     Labs/Imaging:  Eye Exam (10/23/18)  Significant for mild H-lated nuclear cataracts.   Ocular CT was recommended for right eye.     CT Chest/Abdomen/Pelvis (9/26/18)  In this patient with a history of lymphoma:  1. No evidence of disease recurrence, consistent with complete  response per [place] criteria.  2. Stable cardiomegaly. Improved pericardial effusion.  3. Early contrast phase with heterogeneous visceral enhancement in the  abdomen, likely secondary to cardiac dysfunction.    MRI Cardiac w/contrast (4/12/18)  Loculated exudative pericardial effusion that is beginning to organize, with diffuse  pericardial enhancement consistent with ongoing inflammation. Normal LV fxn, LVEF 54% and RVEF 49%. At  least moderate, possibly severe tricuspid regurgitation. Compared to MRI from 03/2018, effusion is largely  unchanged in size (maybe a bit smaller) but is starting to organize. No change in pericardial enhancement.    Laboratory:   Component      Latest Ref Rng & Units 3/25/2021 5/26/2021 6/23/2021 7/21/2021   WBC      4.0 - 11.0 thou/uL 5.6      RBC Count      3.80 - 5.40 mill/uL 4.31      Hemoglobin      12.0 - 16.0 g/dL 15.4      Hematocrit      35.0 - 47.0 % 44.6      MCV      80 - 100 fL 104 (H)      MCH      27.0 - 34.0 pg 35.7 (H)      MCHC      32.0 - 36.0 g/dL 34.5      RDW      11.0 - 14.5 % 14.4      Platelet Count      140 - 440 thou/uL 126 (L)      Mean Platelet Volume      7.0 - 10.0 fL 9.5      % Neutrophils      50 - 70 % 80 (H)      %  Lymphocytes      20 - 40 % 10 (L)      % Monocytes      2 - 10 % 9      % Eosinophils      0 - 6 % 1      % Basophils      0 - 2 % 0      % Immature Granulocytes      <=0 % 0      Absolute Neutrophils      2.0 - 7.7 thou/uL 4.5      Absolute Lymphocytes      0.8 - 4.4 thou/uL 0.6 (L)      Absolute Monocytes      0.0 - 0.9 thou/uL 0.5      Eosinophils Absolute      0.0 - 0.4 thou/uL 0.0      Absolute Basophils      0.0 - 0.2 thou/uL 0.0      Absolute Immature Granulocytes      <=0.0 thou/uL 0.0      Sodium      133 - 144 mmol/L  136 136 137   Potassium      3.4 - 5.3 mmol/L  3.6 4.2 3.9   Chloride      94 - 109 mmol/L  101 102 103   Carbon Dioxide      20 - 32 mmol/L  24 28 28   Anion Gap      3 - 14 mmol/L  11 6 6   Glucose      70 - 99 mg/dL  100 (H) 98 110 (H)   Urea Nitrogen      7 - 30 mg/dL  21 25 29   Creatinine      0.52 - 1.25 mg/dL  1.00 1.05 (H) 1.02   GFR Estimate      >60 mL/min/1.73m2  61 57 (L) 60 (L)   GFR Estimate If Black      >60 mL/min/1.73:m2  71 67    Calcium      8.5 - 10.1 mg/dL  9.3 10.0 9.6   Bilirubin Direct      0.0 - 0.2 mg/dL   0.5 (H)    Bilirubin Total      0.2 - 1.3 mg/dL   1.2    Albumin      3.4 - 5.0 g/dL   4.2    Protein Total      6.8 - 8.8 g/dL   7.5    Alkaline Phosphatase      40 - 150 U/L   136    ALT      0 - 50 U/L   41    AST      0 - 45 U/L   36    N-Terminal Pro Bnp      0 - 125 pg/mL  1,274 (H)     Hemoglobin A1C      0 - 5.6 %  6.2 (H)     Immunoglobulin G      700-1,700 mg/dL 1,179          Impression/Plan:  1. Seropositive rheumatoid arthritis (, RF 31) with multiple joint disability including MTP fusion 1-5 bilaterally, partial right wrist fusion, subluxation and ulnar deformity of MCP's. Rheumatoid arthritis (RA) controlled. Continue prednisone at 3 mg every day (more pain, stiffness off prednisone when she was off), and hydroxychloroquine (plaquenil) 200 mg once a day. Rheumatoid arthritis does not seem to be flaring today, however her joint deformities are  beginning to affect her quality of life due to reduced dexterity. Given her history of Lymphoma would stay away from TNF inhibitors. Will consider Actemra for better control. Will order screening labs today and refer to pain management in the interim.     2. Long-term hydroxychloroquine (plaquenil) use since 6-2017, no toxicity so far, reviewed AAO guidelines, repeat every year.    3. Diffuse large B-cell lymphoma status-post 1 cycle R-EPOCH, 6 cycles R-CHOP; Neuropathy of lower extremities attributed to lymphoma improved on gabapentin 200 mg TID-tolerating. Loculated pericardial effusion, etiology unclear (lymphoma). No symptoms now. Last saw Oncology 09/2023 with good report.    4. Osteoporosis, chronic long-term corticosteroid use with cushingoid appearance. Received 6-7 years of alendronate in early 2000s. Restarted alendronate in 1/2019, off 3-2020 to 9-2020 then restarted 9-2020. DEXA  T-2.6 thoracic. If > 5 yr higher risk atypical Fx. Off fosamax at last visit given improvement of bone density in 3/2021 but on chronic steroid tx, did refer to endocrinology to manage steroid induced osteoporosis and endocrinology resumed it in 11/2021. Calcium and GFR normal . On Fosamax now, planning to switch to Reclast on 09/25/2023      Today 6/28/2024:    Active RA, significant hand pain affecting quality of life. Recommend adding actemra; risks were discussed.      Plan:    Continue HCQ 200mg every day with yearly eye exam    Continue prednisone 3 mg a day    Start actemra when it gets approved by insurance    Labs 1 month after start of actemra     Return in person in about 4 months     TT 40 min was spent on date of the encounter doing chart review, history and exam, documentation and further activities as noted above. Any prior notes, outside records, laboratory results, and imaging studies were reviewed if relevant.    The longitudinal plan of care for the diagnosis(es)/condition(s) as documented were  addressed during this visit. Due to the added complexity in care, I will continue to support Amelia in the subsequent management and with ongoing continuity of care.      Orders Placed This Encounter   Procedures    Lipid panel reflex to direct LDL Fasting    Erythrocyte sedimentation rate auto    CRP inflammation    ALT    Albumin level    AST    Creatinine    Med Therapy Management Referral    CBC with Platelets & Differential           Maribell Domínguez MD

## 2024-06-28 NOTE — LETTER
6/28/2024       RE: Amelia Michel  [address]  [facility] [address]     Dear Colleague,    Thank you for referring your patient, Amelia Michel, to the [facility]OLOG[facility] [place] at [facility]. Please see a copy of my visit note below.    Last seen: 9/21/2023    DOS: 6/28/2024    Reason for in person follow up visit: RA    [facility] - Rheumatology Clinic Visit    Amelia Michel  is a 61 year old here for rheumatoid arthritis (RA) dx 33 y/o. +CCP >331 -RF -KALYAN panel. 5-2108 XR 5-2018 DDD and facet osteoarthritis, congential fusion C2-3) prednisone since dx at 33 y/o, hydroxychloroquine (plaquenil) long-time since dx tapered by 200 mg 9-2019. Lymphoma in liver, heart, bone and mass between esophagus, left pelvic, right ankle.     Review-Dr. Dumas in [facility] for many years. She had relatively inactive disease prior to her arrest but was on methotrexate 10 mg PO per week, Arava 10 mg daily, Plaquenil 200 mg twice per day, and prednisone 3 mg daily. She has had partial fusion of her right wrist. After discharge from the hospital she was put on prednisone 5 mg daily as monotherapy. Sulfasalazine -not effective long-time, initial alan but resolved after retried. Past Dr. Cunha and Dr. 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 Treated concervatively with plaquenil and 5mg prednisone, and not interested in rituximab or other biologics. She has previously been treated with sulfasalazine and initially developed a rash which was initially thought to be a sulfa rash, but her rash resolved and did not reoccur after being placed back on sulfasalazine. She was also treated with Arava, but this discontinued during her cancer treatment. She feels that these drugs have had no effect on her symptoms. Etanercept  "(enbrel) in past no benefits.     March 10, 2021  Denies any fever, chills, SOB, MONTALVO, night sweats, or chest pain, high fever, cough, travel in last 14 days or exposure to covid-19 (coronavirus). Reports healthy. Follows CDC guidelines. Sinus issues for a week other then that fine. Not out tho either. Will be getting vaccine signing in now.     Arthritis is hand bothering her. Left worse, left hand neuropathy from antecubital IV reaction \"take out muscle and fat\". Its her thumb gets her issues. Hurts all the time.  Not swelling. Base of thumb some tingling. The same. With her history cardiac arrest and chemo does have splint, does not need OT. Walking now and mentally doing good.      Right ankle triple arthrodesis Oct 2020, learning how to walk properly. \"much better\".  Doing more more active.     Fosamax 70 mg every 7 days restarted 9-2020 per Dr. Maribell Domínguez, was on with PCP  In past \"not concerned on for too long\" will need future time off of this was off for 18 month. Prednisone 5 mg once day chronically, hydroxychloroquine (plaquenil) 200 mg once day -eye exam in Dec 2020 early catarct          9/9/2021: Ms. Michel is doing fairly well with no major complaints or RA flare ups on  mg qd+predniosne 5 mg every day. Eye exam is updated. Fully vaccinated. PCP told her to hold fosamax till further discussion in Oct.      Has pain over palms. This gets worse throughout the day. No AM stiffness. Does more chores around the house which is a trigger.     1st covid vaccine caused body ache, second dose caused fatigue.    3/9/2022:    No joint swelling, has edema due HF, joints hurt, warm shower helps, lost strength, has stiffness all day.    Eye exam is due 1/2023    Off pred since 3/3 .    9/16/2022:    Amelia is here for follow up. Has pain over R foot, is back on PT,     Has problem with fine motor, it's hard to hold a plate.    Has SOB on and off due to Afib, CHF. BP is up. Seeing cardiology.    No am " stiffness.    No oral ulcers.    No rash.    Back on pred 3 mg every day.    Back on fosamax.      09/21/2023    Overall reports feeling more pain .   - She has a new sharp left buttock pain radiating to the left thigh and stopping at the knee x3 months. No specific exacerbating or relieving factors.   - She reports progressively worsening right ankle pain. In 2020 this joint was surgically fused and since then has progressively inverted and affecting her gait. She is now having increasing right ankle, knee, and hip pain.   - Chronic left knee pain worsening over time. She has had 3 joints injections in the knee but without significant improvement.    - States hands have been more crampy. She denies redness/heat/swelling. States the chronic deformities and cramping affects her dexterity and worsens her ability to complete some activities like cooking    Last saw oncology 9/14 with good report    Current medications include Plaquenil and Prednisone 3mg. Tylenol occasionally but no relief. Last eye exam 01/2023, next already scheduled for 01/2024.     At this time interested in exploring other medications due to increased pain. Questions if pain management may be beneficial as well.   - Currently on Fosamax, start Reclast on Monday    ROS   + dry itchy eyes and itchy ears (improves with claritin)   - No fevers, weight loss, night sweats, chest pain, dyspnea, cough, N/V/D, eye pain or vision changes, nail changes, mouth sores, raynauds,       Today 6/28/2024:      -had L hip arthroplasty on 1/24/2024 after fracture, recovered    -has neuropathy, hard to do things with neuropathy    -pain mx did not help with joint pain    -hands are stiff more at night, wakes her up    -MCPs are hurting at palmar side, more on L side    -had R ankle arthrodesis, wears a brace pain on and off. Does exercise and does massage tx for tony horse pain    -no joint swelling    -her plaquenil dose is 1 tab of 200 mg a day      ROS: A  "comprehensive ROS was done. Positives are per HPI.      PMH:  Injury-left thumb amputation, left finger broke  Medical-  Antiplatelet or antithrombotic long-term use  Arrhythmia  Atrial tachycardia, flutter  Paroxysmal atrial fibrillation  Arthritis  Lymphoma  Cardiac arrest  history of chemotherapy  Primary pulmonary hypertension  Neuropathy  Postoperative nausea and vomiting  Rheumatoid arthritis  Hyperlipidemia LDL goal <130  Lymphedema of both lower extremities  Cardiogenic shock and cardiac arrest 5/2017  Acute respiratory failure with hypoxia  Critical illness myopathy  Sepsis  Wound of left upper extremity  Diffuse large B-cell lymphoma of extranodal site  Febrile neutropenia  Physical deconditioning  Palpitations  Osteoporosis  -DEXA 2018   Slowly healing wound in medial left antecubital fossa after traumatic IV  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, latter felt from lymphoma   Neuropathy of right foot with weakness after intrathecal cytarabine  perioditis   Right arm PICC blood clots during cancer treatments  Right shoulder effusion when had pericarditis       Past Surgical History:  Anesthesia cardioversion  Right wrist arthrodesis, partial fusion \"history migrated out\"  Bone marrow aspirate & biopsy  Excise lesion upper extremity - left wound excision and closure, possible submuscular transposition of ulnar nerve  Foot surgery - 4 left, 2 right  Carpal tunnel bilateral release  Repair ventricular septal defect  Transpositional ulnar nerve (elbow) left   Right shoulder effusion history     Family History:   No autoimmune disorders, psoriasis, UC, crohn's, SLE, RA, PsA, gout, autoimmune thyroid.  No MS, heart disease in family  Mother - breast cancer, hypertension, alzheimer disease-passed   Father - hypertension, lymphoma, circulatory A fib-passed  Paternal grandmother - diabetes  Siblings-younger sister heatlhy PB, younger brother think arthritis not dx PB     Social " History:   No smoking or tobacco use. No alcohol use. No IVDU. None Children. Right handed. . Disability          ROS:    A comprehensive ROS was done. Positives are per HPI.      Ph. E:    /77   Pulse (!) 129   Wt 51.3 kg (113 lb)   SpO2 97%   BMI 23.40 kg/m      Constitutional: NAD, pleasant, ambulates with a cane  Eyes: Normal EOM, conjunctiva, sclera  ENT: no oral ulcers  Neck: No mass or thyroid enlargement  Chest: old sternotomy scar, pacemaker implanted on left chest wall, good perfusion. CTAB  CV: no M/R/G, irregular HR (Afib)  Lymph: No cervical, supraclavicular nodes  MS: +joint tenderness over L MCPs, swelling over palmar side of L MCPs. Chronic RA and surgical deformities. R ankle in brace.  Skin: No rash  Neuro: grossly non focal  Psych: Normal affect.     Labs/Imaging:  Eye Exam (10/23/18)  Significant for mild H-lated nuclear cataracts.   Ocular CT was recommended for right eye.     CT Chest/Abdomen/Pelvis (9/26/18)  In this patient with a history of lymphoma:  1. No evidence of disease recurrence, consistent with complete  response per Lugano criteria.  2. Stable cardiomegaly. Improved pericardial effusion.  3. Early contrast phase with heterogeneous visceral enhancement in the  abdomen, likely secondary to cardiac dysfunction.    MRI Cardiac w/contrast (4/12/18)  Loculated exudative pericardial effusion that is beginning to organize, with diffuse  pericardial enhancement consistent with ongoing inflammation. Normal LV fxn, LVEF 54% and RVEF 49%. At  least moderate, possibly severe tricuspid regurgitation. Compared to MRI from 03/2018, effusion is largely  unchanged in size (maybe a bit smaller) but is starting to organize. No change in pericardial enhancement.    Laboratory:   Component      Latest Ref Rng & Units 3/25/2021 5/26/2021 6/23/2021 7/21/2021   WBC      4.0 - 11.0 thou/uL 5.6      RBC Count      3.80 - 5.40 mill/uL 4.31      Hemoglobin      12.0 - 16.0 g/dL 15.4       Hematocrit      35.0 - 47.0 % 44.6      MCV      80 - 100 fL 104 (H)      MCH      27.0 - 34.0 pg 35.7 (H)      MCHC      32.0 - 36.0 g/dL 34.5      RDW      11.0 - 14.5 % 14.4      Platelet Count      140 - 440 thou/uL 126 (L)      Mean Platelet Volume      7.0 - 10.0 fL 9.5      % Neutrophils      50 - 70 % 80 (H)      % Lymphocytes      20 - 40 % 10 (L)      % Monocytes      2 - 10 % 9      % Eosinophils      0 - 6 % 1      % Basophils      0 - 2 % 0      % Immature Granulocytes      <=0 % 0      Absolute Neutrophils      2.0 - 7.7 thou/uL 4.5      Absolute Lymphocytes      0.8 - 4.4 thou/uL 0.6 (L)      Absolute Monocytes      0.0 - 0.9 thou/uL 0.5      Eosinophils Absolute      0.0 - 0.4 thou/uL 0.0      Absolute Basophils      0.0 - 0.2 thou/uL 0.0      Absolute Immature Granulocytes      <=0.0 thou/uL 0.0      Sodium      133 - 144 mmol/L  136 136 137   Potassium      3.4 - 5.3 mmol/L  3.6 4.2 3.9   Chloride      94 - 109 mmol/L  101 102 103   Carbon Dioxide      20 - 32 mmol/L  24 28 28   Anion Gap      3 - 14 mmol/L  11 6 6   Glucose      70 - 99 mg/dL  100 (H) 98 110 (H)   Urea Nitrogen      7 - 30 mg/dL  21 25 29   Creatinine      0.52 - 1.25 mg/dL  1.00 1.05 (H) 1.02   GFR Estimate      >60 mL/min/1.73m2  61 57 (L) 60 (L)   GFR Estimate If Black      >60 mL/min/1.73:m2  71 67    Calcium      8.5 - 10.1 mg/dL  9.3 10.0 9.6   Bilirubin Direct      0.0 - 0.2 mg/dL   0.5 (H)    Bilirubin Total      0.2 - 1.3 mg/dL   1.2    Albumin      3.4 - 5.0 g/dL   4.2    Protein Total      6.8 - 8.8 g/dL   7.5    Alkaline Phosphatase      40 - 150 U/L   136    ALT      0 - 50 U/L   41    AST      0 - 45 U/L   36    N-Terminal Pro Bnp      0 - 125 pg/mL  1,274 (H)     Hemoglobin A1C      0 - 5.6 %  6.2 (H)     Immunoglobulin G      700-1,700 mg/dL 1,179          Impression/Plan:  1. Seropositive rheumatoid arthritis (, RF 31) with multiple joint disability including MTP fusion 1-5 bilaterally, partial right  wrist fusion, subluxation and ulnar deformity of MCP's. Rheumatoid arthritis (RA) controlled. Continue prednisone at 3 mg every day (more pain, stiffness off prednisone when she was off), and hydroxychloroquine (plaquenil) 200 mg once a day. Rheumatoid arthritis does not seem to be flaring today, however her joint deformities are beginning to affect her quality of life due to reduced dexterity. Given her history of Lymphoma would stay away from TNF inhibitors. Will consider Actemra for better control. Will order screening labs today and refer to pain management in the interim.     2. Long-term hydroxychloroquine (plaquenil) use since 6-2017, no toxicity so far, reviewed AAO guidelines, repeat every year.    3. Diffuse large B-cell lymphoma status-post 1 cycle R-EPOCH, 6 cycles R-CHOP; Neuropathy of lower extremities attributed to lymphoma improved on gabapentin 200 mg TID-tolerating. Loculated pericardial effusion, etiology unclear (lymphoma). No symptoms now. Last saw Oncology 09/2023 with good report.    4. Osteoporosis, chronic long-term corticosteroid use with cushingoid appearance. Received 6-7 years of alendronate in early 2000s. Restarted alendronate in 1/2019, off 3-2020 to 9-2020 then restarted 9-2020. DEXA  T-2.6 thoracic. If > 5 yr higher risk atypical Fx. Off fosamax at last visit given improvement of bone density in 3/2021 but on chronic steroid tx, did refer to endocrinology to manage steroid induced osteoporosis and endocrinology resumed it in 11/2021. Calcium and GFR normal . On Fosamax now, planning to switch to Reclast on 09/25/2023      Today 6/28/2024:    Active RA, significant hand pain affecting quality of life. Recommend adding actemra; risks were discussed.      Plan:    Continue HCQ 200mg every day with yearly eye exam    Continue prednisone 3 mg a day    Start actemra when it gets approved by insurance    Labs 1 month after start of actemra     Return in person in about 4  months     TT 40 min was spent on date of the encounter doing chart review, history and exam, documentation and further activities as noted above. Any prior notes, outside records, laboratory results, and imaging studies were reviewed if relevant.    The longitudinal plan of care for the diagnosis(es)/condition(s) as documented were addressed during this visit. Due to the added complexity in care, I will continue to support Amelia in the subsequent management and with ongoing continuity of care.      Orders Placed This Encounter   Procedures    Lipid panel reflex to direct LDL Fasting    Erythrocyte sedimentation rate auto    CRP inflammation    ALT    Albumin level    AST    Creatinine    Med Therapy Management Referral    CBC with Platelets & Differential           Maribell Domínguez MD

## 2024-06-28 NOTE — PATIENT INSTRUCTIONS
Start actemra when it gets approved by insurance    Labs 1 month after start of actemra     Return in person in about 4 months

## 2024-06-28 NOTE — PROGRESS NOTES
Clinicals needed for [employer] prior authorization .    Pt arrived in ECHO department for scheduled cardioversion.   Procedure explained, questions answered and consent signed. Discharge instructions discussed with patient.  Anesthesia gave pt 35 mg IV brevitol for sedation and pt was DCCV at 150Joules to a SR. Post EKG completed and placed in chart.   Pt denied chest pain or any other discomfort after procedure and was monitored until wakes easily. BP's soft post DCCV, 80's/60's-90's/60's. Pt asymptomatic; cards 1 team aware. Escorted back to  via stretcher and hospital transport.   Report given to ABBEY Myoer.

## 2024-07-01 ENCOUNTER — TELEPHONE (OUTPATIENT)
Dept: RHEUMATOLOGY | Facility: CLINIC | Age: 64
End: 2024-07-01
Payer: COMMERCIAL

## 2024-07-01 NOTE — TELEPHONE ENCOUNTER
Prior Authorization Approval    Medication: ACTEMRA ACTPEN 162 MG/0.9ML SC SOAJ  Authorization Effective Date: 6/1/2024  Authorization Expiration Date: 12/28/2024  Approved Dose/Quantity: 2 pens per 28 days  Reference #: [id number]   Insurance Company: [employer] - Phone [phone] Fax [phone]  Expected CoPay: $    CoPay Card Available: Yes    Financial Assistance Needed: Unknown, offered  Which Pharmacy is filling the prescription: [address]  Pharmacy Notified: Released RX  Patient Notified: Yes          PA Initiation    Medication: ACTEMRA ACTPEN 162 MG/0.9ML SC SOAJ  Insurance Company: [employer] - Phone [phone] Fax [phone]  Pharmacy Filling the Rx: [facility] [address]  Filling Pharmacy Phone:    Filling Pharmacy Fax:    Start Date: 7/1/2024

## 2024-07-03 ENCOUNTER — VIRTUAL VISIT (OUTPATIENT)
Dept: RHEUMATOLOGY | Facility: CLINIC | Age: 64
End: 2024-07-03
Attending: INTERNAL MEDICINE
Payer: COMMERCIAL

## 2024-07-03 DIAGNOSIS — Z78.9 TAKES DIETARY SUPPLEMENTS: ICD-10-CM

## 2024-07-03 DIAGNOSIS — Z71.85 VACCINE COUNSELING: ICD-10-CM

## 2024-07-03 DIAGNOSIS — Z95.4 S/P TVR (TRICUSPID VALVE REPLACEMENT): ICD-10-CM

## 2024-07-03 DIAGNOSIS — I48.0 PAROXYSMAL ATRIAL FIBRILLATION (H): Chronic | ICD-10-CM

## 2024-07-03 DIAGNOSIS — M05.79 RHEUMATOID ARTHRITIS INVOLVING MULTIPLE SITES WITH POSITIVE RHEUMATOID FACTOR (H): Primary | ICD-10-CM

## 2024-07-03 DIAGNOSIS — G62.9 PERIPHERAL POLYNEUROPATHY: ICD-10-CM

## 2024-07-03 DIAGNOSIS — I47.19 ATRIAL TACHYCARDIA (H): ICD-10-CM

## 2024-07-03 DIAGNOSIS — I50.9 CHRONIC CONGESTIVE HEART FAILURE, UNSPECIFIED HEART FAILURE TYPE (H): ICD-10-CM

## 2024-07-03 NOTE — Clinical Note
7/3/2024       RE: Amelia Michel  [address] [facility] [address]     Dear Colleague,    Thank you for referring your patient, Amelia Michel, to the [facility] RHEUMATOLOGY CLINIC [place] at [facility]. Please see a copy of my visit note below.    Medication Therapy Management (MTM) Encounter    ASSESSMENT:                            Medication Adherence/Access: No issues identified    Rheumatoid Arthritis/Neuropathy: Provided education on Actemra today including dosing, general administration, side effects (both common/serious), precautions, monitoring and time to efficacy. Discussed data on malignancy and risk of serious infection in depth. Encouraged indicated non-live vaccines and avoidance of live vaccines. Discussed potential need to hold therapy in the setting of signs/symptoms of active infection. Encouraged her to contact the rheumatology clinic in the event she has questions on this. Would benefit from starting Actemra once it arrives and using every 14 days as directed.    Vaccines: Per ACIP recommendations, eligible for RSV vaccine. Discussed completing this in fall 2024 with covid and influenza vaccines.    Heart Failure/Tachycardia/Afib/Hx of TVR: Would benefit from continued follow up with cardiology and continuing current medications.    Supplements: Stable.    PLAN:                            1. Actemra was approved through your insurance and sent to [facility] pharmacy. The pharmacy will call you to set up delivery so you can start Actemra 162 mg (1 injection) every 14 days.     2. A common side effect of Actemra is injection site reactions (red, raised, itchy spot at injection site). You can use hydrocortisone cream and ice to treat these reactions if they occur.     3. Vaccine recommendations: Consider receiving your RSV vaccine with your regular covid and influenza vaccines this fall.    Follow-up:  {[email or web address]:797759}    SUBJECTIVE/OBJECTIVE:                          Amelia Michel is a 63 year old adult seen for an initial visit. Amelia was referred to me from Maribell Domínguez MD.      Reason for visit: Actemra follow up    Allergies/ADRs: Reviewed in chart  Past Medical History: Reviewed in chart  Tobacco: Amelia reports that Amelia has never smoked. Amelia has never used smokeless tobacco.  Alcohol: not currently using    Medication Adherence/Access: no issues reported    Rheumatoid Arthritis/Neuropathy:   Hydroxychloroquine 200 mg daily  Prednisone 3 mg daily  Gabapentin 100 mg every morning and afternoon, 200 mg at bedtime  Tylenol 650 mg as needed     Reports she has noticed more joint pain in her hands and ankles recently. Has also been having stiffness in her hands but no swelling. Rheumatologist recommended starting Actemra - approved through insurance but has not gotten from pharmacy yet. Would like to review Actemra in depth today before starting.    Reviewed baseline pre-biologic screening.   Hep C antibody non-reactive  Hep B surface antigen non-reactive  Hep B core antibody non-reactive  Quantiferon TB Negative    Liver Function Studies -   Recent Labs   Lab Test 03/22/24  1500   PROTTOTAL 7.9   ALBUMIN 4.5   BILITOTAL 0.7   ALKPHOS 131   AST 34   ALT 21     CBC RESULTS:   Recent Labs   Lab Test 01/25/24  0700 01/15/24  1457   WBC  --  5.4   RBC  --  4.35   HGB 11.1* 15.2   HCT  --  45.4   MCV  --  104*   MCH  --  34.9*   MCHC  --  33.5   RDW  --  11.9   PLT  --  139*     Vaccines: Due for RSV vaccine.  Immunization History   Administered Date(s) Administered     COVID-19 12+ (2023-24) (MODERNA) 10/27/2023     COVID-19 Bivalent 12+ (Pfizer) 10/12/2022     COVID-19 MONOVALENT 12+ (Pfizer) 03/25/2021, 04/15/2021, 11/01/2021     COVID-19 Monovalent 12+ (Pfizer 2022) 03/10/2022     Influenza (intradermal) 10/27/2011, 09/30/2012     Influenza Vaccine 18-64 (Flublok) 10/24/2019, 10/14/2020,  10/15/2021     Influenza Vaccine >6 months,quad, PF 11/07/2017, 10/04/2018, 10/12/2022, 10/19/2023     Pneumo Conj 13-V (2010&after) 11/13/2019     Pneumococcal 23 valent 08/31/2011, 12/19/2022     TDAP (Adacel,Boostrix) 10/15/2021     TDAP Vaccine (Adacel) 02/08/2011     Zoster recombinant adjuvanted (SHINGRIX) 03/26/2019, 09/10/2019     Heart Failure/Tachycardia/Afib/Hx of TVR:  Diltiazem  mg daily  Spironolactone 12.5 mg daily  Torsemide 20 mg daily  Jardiance 10 mg daily  Eliquis 5 mg twice daily   Aspirin 81 mg daily  Potassium 60 mEq daily    No side effects or concerns noted.    BP Readings from Last 3 Encounters:   06/28/24 110/77   03/22/24 135/83   02/02/24 122/73     Pulse Readings from Last 3 Encounters:   06/28/24 (!) 129   03/22/24 82   02/02/24 73     Potassium   Date Value Ref Range Status   03/22/2024 4.3 3.4 - 5.3 mmol/L Final   07/20/2022 3.9 3.4 - 5.3 mmol/L Final   06/23/2021 4.2 3.4 - 5.3 mmol/L Final     Supplements:   Calcium/Vitamin D 2 tabs daily  Magnesium 200 mg daily  Multivitamin daily  Vitamin D daily    No reported issues at this time.     Today's Vitals: There were no vitals taken for this visit.  ----------------    I spent 53 minutes with this patient today. All changes were made via collaborative practice agreement with Maribell Domínguez. A copy of the visit note was provided to the patient's provider(s).    A summary of these recommendations was sent via [employer].    Sincere Sauceda, PharmD  Medication Therapy Management Pharmacist  [facility]  Phone: [phone]    Telemedicine Visit Details  Type of service:  Telephone visit  Start Time: 2:00 PM  End Time: 2:53 PM     Medication Therapy Recommendations  No medication therapy recommendations to display         Again, thank you for allowing me to participate in the care of your patient.      Sincerely,    SINCERE SAUCEDA, [facility]

## 2024-07-15 NOTE — PROGRESS NOTES
Medication Therapy Management (MTM) Encounter    ASSESSMENT:                            Medication Adherence/Access: No issues identified    Rheumatoid Arthritis/Neuropathy: Provided education on Actemra today including dosing, general administration, side effects (both common/serious), precautions, monitoring and time to efficacy. Discussed data on malignancy and risk of serious infection in depth. Encouraged indicated non-live vaccines and avoidance of live vaccines. Discussed potential need to hold therapy in the setting of signs/symptoms of active infection. Encouraged her to contact the rheumatology clinic in the event she has questions on this. Would benefit from starting Actemra once it arrives and using every 14 days as directed.    Vaccines: Per ACIP recommendations, eligible for RSV vaccine. Discussed completing this in fall 2024 with covid and influenza vaccines.    Heart Failure/Tachycardia/Afib/Hx of TVR: Would benefit from continued follow up with cardiology and continuing current medications.    Supplements: Stable.    PLAN:                            1. Actemra was approved through your insurance and sent to [facility] specialty pharmacy. The pharmacy will call you to set up delivery so you can start Actemra 162 mg (1 injection) every 14 days.     2. A common side effect of Actemra is injection site reactions (red, raised, itchy spot at injection site). You can use hydrocortisone cream and ice to treat these reactions if they occur.     3. Vaccine recommendations: Consider receiving your RSV vaccine with your regular covid and influenza vaccines this fall.    Follow-up: Return in about 3 months (around 10/3/2024) for MTM Pharmacist Visit.    SUBJECTIVE/OBJECTIVE:                          Amelia Michel is a 63 year old adult seen for an initial visit. Amelia was referred to me from Maribell Domínguez MD.      Reason for visit: Actemra follow up    Allergies/ADRs: Reviewed in chart  Past Medical History:  Reviewed in chart  Tobacco: Amelia reports that Amelia has never smoked. Amelia has never used smokeless tobacco.  Alcohol: not currently using    Medication Adherence/Access: no issues reported    Rheumatoid Arthritis/Neuropathy:   Hydroxychloroquine 200 mg daily  Prednisone 3 mg daily  Gabapentin 100 mg every morning and afternoon, 200 mg at bedtime  Tylenol 650 mg as needed     Reports she has noticed more joint pain in her hands and ankles recently. Has also been having stiffness in her hands but no swelling. Rheumatologist recommended starting Actemra - approved through insurance but has not gotten from pharmacy yet. Would like to review Actemra in depth today before starting.    Reviewed baseline pre-biologic screening.   Hep C antibody non-reactive  Hep B surface antigen non-reactive  Hep B core antibody non-reactive  Quantiferon TB Negative    Liver Function Studies -   Recent Labs   Lab Test 03/22/24  1500   PROTTOTAL 7.9   ALBUMIN 4.5   BILITOTAL 0.7   ALKPHOS 131   AST 34   ALT 21     CBC RESULTS:   Recent Labs   Lab Test 01/25/24  0700 01/15/24  1457   WBC  --  5.4   RBC  --  4.35   HGB 11.1* 15.2   HCT  --  45.4   MCV  --  104*   MCH  --  34.9*   MCHC  --  33.5   RDW  --  11.9   PLT  --  139*     Vaccines: Due for RSV vaccine.  Immunization History   Administered Date(s) Administered    COVID-19 12+ (2023-24) (MODERNA) 10/27/2023    COVID-19 Bivalent 12+ (Pfizer) 10/12/2022    COVID-19 MONOVALENT 12+ (Pfizer) 03/25/2021, 04/15/2021, 11/01/2021    COVID-19 Monovalent 12+ (Pfizer 2022) 03/10/2022    Influenza (intradermal) 10/27/2011, 09/30/2012    Influenza Vaccine 18-64 (Flublok) 10/24/2019, 10/14/2020, 10/15/2021    Influenza Vaccine >6 months,quad, PF 11/07/2017, 10/04/2018, 10/12/2022, 10/19/2023    Pneumo Conj 13-V (2010&after) 11/13/2019    Pneumococcal 23 valent 08/31/2011, 12/19/2022    TDAP (Adacel,Boostrix) 10/15/2021    TDAP Vaccine (Adacel) 02/08/2011    Zoster recombinant adjuvanted  (SHINGRIX) 03/26/2019, 09/10/2019     Heart Failure/Tachycardia/Afib/Hx of TVR:  Diltiazem  mg daily  Spironolactone 12.5 mg daily  Torsemide 20 mg daily  Jardiance 10 mg daily  Eliquis 5 mg twice daily   Aspirin 81 mg daily  Potassium 60 mEq daily    No side effects or concerns noted.    BP Readings from Last 3 Encounters:   06/28/24 110/77   03/22/24 135/83   02/02/24 122/73     Pulse Readings from Last 3 Encounters:   06/28/24 (!) 129   03/22/24 82   02/02/24 73     Potassium   Date Value Ref Range Status   03/22/2024 4.3 3.4 - 5.3 mmol/L Final   07/20/2022 3.9 3.4 - 5.3 mmol/L Final   06/23/2021 4.2 3.4 - 5.3 mmol/L Final     Supplements:   Calcium/Vitamin D 2 tabs daily  Magnesium 200 mg daily  Multivitamin daily  Vitamin D daily    No reported issues at this time.     Today's Vitals: There were no vitals taken for this visit.  ----------------    I spent 53 minutes with this patient today. All changes were made via collaborative practice agreement with Maribell Domínguez. A copy of the visit note was provided to the patient's provider(s).    A summary of these recommendations was sent via [employer].    Archana Brar, PharmD  Medication Therapy Management Pharmacist  [facility]  Phone: [phone]    Telemedicine Visit Details  Type of service:  Telephone visit  Start Time: 2:00 PM  End Time: 2:53 PM     Medication Therapy Recommendations  RA (rheumatoid arthritis) (H)    Current Medication: Tocilizumab (ACTEMRA ACTPEN) 162 MG/0.9ML SOAJ   Rationale: Does not understand instructions - Adherence - Adherence   Recommendation: Provide Education   Status: Patient Agreed - Adherence/Education         Vaccine counseling    Rationale: Preventive therapy - Needs additional medication therapy - Indication   Recommendation: Start Medication - RSVPREF3 VAC RECOMB ADJUVANTED   Status: Accepted - no CPA Needed

## 2024-07-29 DIAGNOSIS — E27.49 SECONDARY ADRENAL INSUFFICIENCY (H): ICD-10-CM

## 2024-07-29 DIAGNOSIS — E83.52 HYPERCALCEMIA: ICD-10-CM

## 2024-07-29 DIAGNOSIS — E04.2 NONTOXIC MULTINODULAR GOITER: ICD-10-CM

## 2024-07-29 DIAGNOSIS — M81.8 STEROID-INDUCED OSTEOPOROSIS: Primary | ICD-10-CM

## 2024-07-29 DIAGNOSIS — T38.0X5A STEROID-INDUCED OSTEOPOROSIS: Primary | ICD-10-CM

## 2024-08-05 ENCOUNTER — ANCILLARY PROCEDURE (OUTPATIENT)
Dept: CARDIOLOGY | Facility: CLINIC | Age: 64
End: 2024-08-05
Attending: INTERNAL MEDICINE
Payer: COMMERCIAL

## 2024-08-05 DIAGNOSIS — R00.1 BRADYCARDIA: ICD-10-CM

## 2024-08-05 PROCEDURE — 93296 REM INTERROG EVL PM/IDS: CPT

## 2024-08-05 PROCEDURE — 93294 REM INTERROG EVL PM/LDLS PM: CPT | Performed by: INTERNAL MEDICINE

## 2024-08-12 ENCOUNTER — MYC REFILL (OUTPATIENT)
Dept: CARDIOLOGY | Facility: CLINIC | Age: 64
End: 2024-08-12
Payer: COMMERCIAL

## 2024-08-12 DIAGNOSIS — I50.30 HEART FAILURE WITH PRESERVED EJECTION FRACTION, NYHA CLASS I (H): ICD-10-CM

## 2024-08-12 DIAGNOSIS — E87.6 HYPOKALEMIA: ICD-10-CM

## 2024-08-15 RX ORDER — POTASSIUM CHLORIDE 1500 MG/1
60 TABLET, EXTENDED RELEASE ORAL DAILY
Qty: 270 TABLET | Refills: 3 | Status: SHIPPED | OUTPATIENT
Start: 2024-08-15

## 2024-08-18 DIAGNOSIS — I48.0 PAROXYSMAL ATRIAL FIBRILLATION (H): ICD-10-CM

## 2024-08-22 NOTE — TELEPHONE ENCOUNTER
apixaban ANTICOAGULANT (ELIQUIS ANTICOAGULANT) 5 MG tablet 180 tablet 3 8/8/2023     Last Office Visit: 3/22/24  Future Office visit:   10/2/24    Anticoagulant Agents [id number] 01:17 PM   Protocol Details Normal Platelets on file in past 12 months    Weight is greater than 60 kg for the past year     Component      Latest Ref Rng 1/15/2024  2:57 PM   WBC      4.0 - 11.0 10e3/uL 5.4    RBC Count      3.80 - 5.90 10e6/uL 4.35    Hemoglobin      11.7 - 17.7 g/dL 15.2    Hematocrit      35.0 - 53.0 % 45.4    MCV      78 - 100 fL 104 (H)    MCH      26.5 - 33.0 pg 34.9 (H)    MCHC      31.5 - 36.5 g/dL 33.5    RDW      10.0 - 15.0 % 11.9    Platelet Count      150 - 450 10e3/uL 139 (L)      6/28/2024         BP: 110/77  122/73    Weight: 51.3 kg (113 lb)          Routing refill request to provider for review/approval because:  Abnormal lab, weight < 60 kg    Anu Michaud RN  [facility]/Red Flag Triage & Med Refill Team

## 2024-08-23 RX ORDER — APIXABAN 5 MG/1
5 TABLET, FILM COATED ORAL 2 TIMES DAILY
Qty: 180 TABLET | Refills: 3 | Status: SHIPPED | OUTPATIENT
Start: 2024-08-23

## 2024-08-27 LAB
MDC_IDC_EPISODE_DTM: NORMAL
MDC_IDC_EPISODE_DURATION: 0 S
MDC_IDC_EPISODE_DURATION: 0 S
MDC_IDC_EPISODE_DURATION: 1 S
MDC_IDC_EPISODE_DURATION: 1022 S
MDC_IDC_EPISODE_DURATION: 1050 S
MDC_IDC_EPISODE_DURATION: 1065 S
MDC_IDC_EPISODE_DURATION: 121 S
MDC_IDC_EPISODE_DURATION: 129 S
MDC_IDC_EPISODE_DURATION: 1336 S
MDC_IDC_EPISODE_DURATION: 1382 S
MDC_IDC_EPISODE_DURATION: 1631 S
MDC_IDC_EPISODE_DURATION: 1658 S
MDC_IDC_EPISODE_DURATION: 1818 S
MDC_IDC_EPISODE_DURATION: 191 S
MDC_IDC_EPISODE_DURATION: 1950 S
MDC_IDC_EPISODE_DURATION: 2 S
MDC_IDC_EPISODE_DURATION: 2003 S
MDC_IDC_EPISODE_DURATION: 21 S
MDC_IDC_EPISODE_DURATION: 21 S
MDC_IDC_EPISODE_DURATION: 227 S
MDC_IDC_EPISODE_DURATION: 2311 S
MDC_IDC_EPISODE_DURATION: 2495 S
MDC_IDC_EPISODE_DURATION: 260 S
MDC_IDC_EPISODE_DURATION: 265 S
MDC_IDC_EPISODE_DURATION: 272 S
MDC_IDC_EPISODE_DURATION: 303 S
MDC_IDC_EPISODE_DURATION: 31 S
MDC_IDC_EPISODE_DURATION: 3101 S
MDC_IDC_EPISODE_DURATION: 314 S
MDC_IDC_EPISODE_DURATION: 316 S
MDC_IDC_EPISODE_DURATION: 3221 S
MDC_IDC_EPISODE_DURATION: 3740 S
MDC_IDC_EPISODE_DURATION: 40 S
MDC_IDC_EPISODE_DURATION: 405 S
MDC_IDC_EPISODE_DURATION: 4811 S
MDC_IDC_EPISODE_DURATION: 511 S
MDC_IDC_EPISODE_DURATION: 5250 S
MDC_IDC_EPISODE_DURATION: 56 S
MDC_IDC_EPISODE_DURATION: 649 S
MDC_IDC_EPISODE_DURATION: 661 S
MDC_IDC_EPISODE_DURATION: 701 S
MDC_IDC_EPISODE_DURATION: 7101 S
MDC_IDC_EPISODE_DURATION: 75 S
MDC_IDC_EPISODE_DURATION: 7597 S
MDC_IDC_EPISODE_DURATION: 7754 S
MDC_IDC_EPISODE_DURATION: 81 S
MDC_IDC_EPISODE_DURATION: 816 S
MDC_IDC_EPISODE_DURATION: 8356 S
MDC_IDC_EPISODE_DURATION: 9785 S
MDC_IDC_EPISODE_DURATION: NORMAL S
MDC_IDC_EPISODE_ID: NORMAL
MDC_IDC_EPISODE_TYPE: NORMAL
MDC_IDC_LEAD_CONNECTION_STATUS: NORMAL
MDC_IDC_LEAD_CONNECTION_STATUS: NORMAL
MDC_IDC_LEAD_IMPLANT_DT: NORMAL
MDC_IDC_LEAD_IMPLANT_DT: NORMAL
MDC_IDC_LEAD_LOCATION: NORMAL
MDC_IDC_LEAD_LOCATION: NORMAL
MDC_IDC_LEAD_LOCATION_DETAIL_1: NORMAL
MDC_IDC_LEAD_LOCATION_DETAIL_1: NORMAL
MDC_IDC_LEAD_MFG: NORMAL
MDC_IDC_LEAD_MFG: NORMAL
MDC_IDC_LEAD_MODEL: NORMAL
MDC_IDC_LEAD_MODEL: NORMAL
MDC_IDC_LEAD_POLARITY_TYPE: NORMAL
MDC_IDC_LEAD_POLARITY_TYPE: NORMAL
MDC_IDC_LEAD_SERIAL: NORMAL
MDC_IDC_LEAD_SERIAL: NORMAL
MDC_IDC_LEAD_SPECIAL_FUNCTION: NORMAL
MDC_IDC_LEAD_SPECIAL_FUNCTION: NORMAL
MDC_IDC_MSMT_BATTERY_DTM: NORMAL
MDC_IDC_MSMT_BATTERY_REMAINING_LONGEVITY: 105 MO
MDC_IDC_MSMT_BATTERY_RRT_TRIGGER: 2.62
MDC_IDC_MSMT_BATTERY_STATUS: NORMAL
MDC_IDC_MSMT_BATTERY_VOLTAGE: 2.99 V
MDC_IDC_MSMT_LEADCHNL_RA_IMPEDANCE_VALUE: 304 OHM
MDC_IDC_MSMT_LEADCHNL_RA_IMPEDANCE_VALUE: 418 OHM
MDC_IDC_MSMT_LEADCHNL_RA_PACING_THRESHOLD_AMPLITUDE: 0.5 V
MDC_IDC_MSMT_LEADCHNL_RA_PACING_THRESHOLD_PULSEWIDTH: 0.4 MS
MDC_IDC_MSMT_LEADCHNL_RA_SENSING_INTR_AMPL: 0.62 MV
MDC_IDC_MSMT_LEADCHNL_RA_SENSING_INTR_AMPL: 0.62 MV
MDC_IDC_MSMT_LEADCHNL_RV_IMPEDANCE_VALUE: 418 OHM
MDC_IDC_MSMT_LEADCHNL_RV_IMPEDANCE_VALUE: 513 OHM
MDC_IDC_MSMT_LEADCHNL_RV_PACING_THRESHOLD_AMPLITUDE: 0.62 V
MDC_IDC_MSMT_LEADCHNL_RV_PACING_THRESHOLD_PULSEWIDTH: 0.4 MS
MDC_IDC_MSMT_LEADCHNL_RV_SENSING_INTR_AMPL: 16 MV
MDC_IDC_MSMT_LEADCHNL_RV_SENSING_INTR_AMPL: 16 MV
MDC_IDC_PG_IMPLANT_DTM: NORMAL
MDC_IDC_PG_MFG: NORMAL
MDC_IDC_PG_MODEL: NORMAL
MDC_IDC_PG_SERIAL: NORMAL
MDC_IDC_PG_TYPE: NORMAL
MDC_IDC_SESS_CLINIC_NAME: NORMAL
MDC_IDC_SESS_DTM: NORMAL
MDC_IDC_SESS_TYPE: NORMAL
MDC_IDC_SET_BRADY_AT_MODE_SWITCH_RATE: 171 {BEATS}/MIN
MDC_IDC_SET_BRADY_HYSTRATE: NORMAL
MDC_IDC_SET_BRADY_LOWRATE: 75 {BEATS}/MIN
MDC_IDC_SET_BRADY_MAX_SENSOR_RATE: 130 {BEATS}/MIN
MDC_IDC_SET_BRADY_MAX_TRACKING_RATE: 130 {BEATS}/MIN
MDC_IDC_SET_BRADY_MODE: NORMAL
MDC_IDC_SET_BRADY_PAV_DELAY_LOW: 180 MS
MDC_IDC_SET_BRADY_SAV_DELAY_LOW: 150 MS
MDC_IDC_SET_LEADCHNL_RA_PACING_AMPLITUDE: 1.5 V
MDC_IDC_SET_LEADCHNL_RA_PACING_ANODE_ELECTRODE_1: NORMAL
MDC_IDC_SET_LEADCHNL_RA_PACING_ANODE_LOCATION_1: NORMAL
MDC_IDC_SET_LEADCHNL_RA_PACING_CAPTURE_MODE: NORMAL
MDC_IDC_SET_LEADCHNL_RA_PACING_CATHODE_ELECTRODE_1: NORMAL
MDC_IDC_SET_LEADCHNL_RA_PACING_CATHODE_LOCATION_1: NORMAL
MDC_IDC_SET_LEADCHNL_RA_PACING_POLARITY: NORMAL
MDC_IDC_SET_LEADCHNL_RA_PACING_PULSEWIDTH: 0.4 MS
MDC_IDC_SET_LEADCHNL_RA_SENSING_ANODE_ELECTRODE_1: NORMAL
MDC_IDC_SET_LEADCHNL_RA_SENSING_ANODE_LOCATION_1: NORMAL
MDC_IDC_SET_LEADCHNL_RA_SENSING_CATHODE_ELECTRODE_1: NORMAL
MDC_IDC_SET_LEADCHNL_RA_SENSING_CATHODE_LOCATION_1: NORMAL
MDC_IDC_SET_LEADCHNL_RA_SENSING_POLARITY: NORMAL
MDC_IDC_SET_LEADCHNL_RA_SENSING_SENSITIVITY: 0.3 MV
MDC_IDC_SET_LEADCHNL_RV_PACING_AMPLITUDE: 2 V
MDC_IDC_SET_LEADCHNL_RV_PACING_ANODE_ELECTRODE_1: NORMAL
MDC_IDC_SET_LEADCHNL_RV_PACING_ANODE_LOCATION_1: NORMAL
MDC_IDC_SET_LEADCHNL_RV_PACING_CAPTURE_MODE: NORMAL
MDC_IDC_SET_LEADCHNL_RV_PACING_CATHODE_ELECTRODE_1: NORMAL
MDC_IDC_SET_LEADCHNL_RV_PACING_CATHODE_LOCATION_1: NORMAL
MDC_IDC_SET_LEADCHNL_RV_PACING_POLARITY: NORMAL
MDC_IDC_SET_LEADCHNL_RV_PACING_PULSEWIDTH: 0.4 MS
MDC_IDC_SET_LEADCHNL_RV_SENSING_ANODE_ELECTRODE_1: NORMAL
MDC_IDC_SET_LEADCHNL_RV_SENSING_ANODE_LOCATION_1: NORMAL
MDC_IDC_SET_LEADCHNL_RV_SENSING_CATHODE_ELECTRODE_1: NORMAL
MDC_IDC_SET_LEADCHNL_RV_SENSING_CATHODE_LOCATION_1: NORMAL
MDC_IDC_SET_LEADCHNL_RV_SENSING_POLARITY: NORMAL
MDC_IDC_SET_LEADCHNL_RV_SENSING_SENSITIVITY: 0.9 MV
MDC_IDC_SET_ZONE_DETECTION_INTERVAL: 350 MS
MDC_IDC_SET_ZONE_DETECTION_INTERVAL: 400 MS
MDC_IDC_SET_ZONE_STATUS: NORMAL
MDC_IDC_SET_ZONE_STATUS: NORMAL
MDC_IDC_SET_ZONE_TYPE: NORMAL
MDC_IDC_SET_ZONE_VENDOR_TYPE: NORMAL
MDC_IDC_STAT_AT_BURDEN_PERCENT: 4.5 %
MDC_IDC_STAT_AT_DTM_END: NORMAL
MDC_IDC_STAT_AT_DTM_START: NORMAL
MDC_IDC_STAT_BRADY_AP_VP_PERCENT: 82.32 %
MDC_IDC_STAT_BRADY_AP_VS_PERCENT: 0.35 %
MDC_IDC_STAT_BRADY_AS_VP_PERCENT: 16.38 %
MDC_IDC_STAT_BRADY_AS_VS_PERCENT: 0.95 %
MDC_IDC_STAT_BRADY_DTM_END: NORMAL
MDC_IDC_STAT_BRADY_DTM_START: NORMAL
MDC_IDC_STAT_BRADY_RA_PERCENT_PACED: 79.85 %
MDC_IDC_STAT_BRADY_RV_PERCENT_PACED: 98.59 %
MDC_IDC_STAT_EPISODE_RECENT_COUNT: 0
MDC_IDC_STAT_EPISODE_RECENT_COUNT: 12
MDC_IDC_STAT_EPISODE_RECENT_COUNT: 88
MDC_IDC_STAT_EPISODE_RECENT_COUNT_DTM_END: NORMAL
MDC_IDC_STAT_EPISODE_RECENT_COUNT_DTM_START: NORMAL
MDC_IDC_STAT_EPISODE_TOTAL_COUNT: 0
MDC_IDC_STAT_EPISODE_TOTAL_COUNT: 401
MDC_IDC_STAT_EPISODE_TOTAL_COUNT: 5420
MDC_IDC_STAT_EPISODE_TOTAL_COUNT: 815
MDC_IDC_STAT_EPISODE_TOTAL_COUNT: NORMAL
MDC_IDC_STAT_EPISODE_TOTAL_COUNT_DTM_END: NORMAL
MDC_IDC_STAT_EPISODE_TOTAL_COUNT_DTM_START: NORMAL
MDC_IDC_STAT_EPISODE_TYPE: NORMAL
MDC_IDC_STAT_TACHYTHERAPY_RECENT_DTM_END: NORMAL
MDC_IDC_STAT_TACHYTHERAPY_RECENT_DTM_START: NORMAL
MDC_IDC_STAT_TACHYTHERAPY_TOTAL_DTM_END: NORMAL
MDC_IDC_STAT_TACHYTHERAPY_TOTAL_DTM_START: NORMAL

## 2024-09-09 ENCOUNTER — HOSPITAL ENCOUNTER (OUTPATIENT)
Dept: BONE DENSITY | Facility: CLINIC | Age: 64
Discharge: HOME | End: 2024-09-09
Attending: INTERNAL MEDICINE | Admitting: INTERNAL MEDICINE
Payer: COMMERCIAL

## 2024-09-09 DIAGNOSIS — M81.8 STEROID-INDUCED OSTEOPOROSIS: ICD-10-CM

## 2024-09-09 DIAGNOSIS — T38.0X5A STEROID-INDUCED OSTEOPOROSIS: ICD-10-CM

## 2024-09-09 PROCEDURE — 77081 DXA BONE DENSITY APPENDICULR: CPT

## 2024-09-09 PROCEDURE — 77080 DXA BONE DENSITY AXIAL: CPT

## 2024-09-10 ENCOUNTER — LAB (OUTPATIENT)
Dept: LAB | Facility: CLINIC | Age: 64
End: 2024-09-10
Payer: COMMERCIAL

## 2024-09-10 DIAGNOSIS — C83.30 DIFFUSE LARGE B-CELL LYMPHOMA, UNSPECIFIED BODY REGION (H): ICD-10-CM

## 2024-09-10 DIAGNOSIS — Z79.899 LONG-TERM USE OF PLAQUENIL: ICD-10-CM

## 2024-09-10 DIAGNOSIS — E04.2 NONTOXIC MULTINODULAR GOITER: ICD-10-CM

## 2024-09-10 DIAGNOSIS — E83.52 HYPERCALCEMIA: ICD-10-CM

## 2024-09-10 DIAGNOSIS — T38.0X5A STEROID-INDUCED OSTEOPOROSIS: ICD-10-CM

## 2024-09-10 DIAGNOSIS — Z51.81 ENCOUNTER FOR MEDICATION MONITORING: ICD-10-CM

## 2024-09-10 DIAGNOSIS — M05.79 RHEUMATOID ARTHRITIS INVOLVING MULTIPLE SITES WITH POSITIVE RHEUMATOID FACTOR (H): ICD-10-CM

## 2024-09-10 DIAGNOSIS — M81.8 STEROID-INDUCED OSTEOPOROSIS: ICD-10-CM

## 2024-09-10 DIAGNOSIS — N18.30 CHRONIC KIDNEY DISEASE, STAGE 3 (H): Primary | ICD-10-CM

## 2024-09-10 DIAGNOSIS — E27.49 SECONDARY ADRENAL INSUFFICIENCY (H): ICD-10-CM

## 2024-09-10 LAB
ALBUMIN SERPL BCG-MCNC: 5 G/DL (ref 3.5–5.2)
ALP SERPL-CCNC: 99 U/L (ref 40–150)
ALT SERPL W P-5'-P-CCNC: 27 U/L (ref 0–70)
ANION GAP SERPL CALCULATED.3IONS-SCNC: 14 MMOL/L (ref 7–15)
AST SERPL W P-5'-P-CCNC: 41 U/L (ref 0–45)
BILIRUB SERPL-MCNC: 0.9 MG/DL
BUN SERPL-MCNC: 31.2 MG/DL (ref 8–23)
CALCIUM SERPL-MCNC: 10.5 MG/DL (ref 8.8–10.4)
CALCIUM SERPL-MCNC: 10.5 MG/DL (ref 8.8–10.4)
CHLORIDE SERPL-SCNC: 92 MMOL/L (ref 98–107)
CREAT SERPL-MCNC: 1.25 MG/DL (ref 0.51–1.17)
EGFRCR SERPLBLD CKD-EPI 2021: 48 ML/MIN/1.73M2
GLUCOSE SERPL-MCNC: 105 MG/DL (ref 70–99)
HCO3 SERPL-SCNC: 30 MMOL/L (ref 22–29)
POTASSIUM SERPL-SCNC: 3.5 MMOL/L (ref 3.4–5.3)
PROT SERPL-MCNC: 8.1 G/DL (ref 6.4–8.3)
SODIUM SERPL-SCNC: 136 MMOL/L (ref 135–145)
TSH SERPL DL<=0.005 MIU/L-ACNC: 0.94 UIU/ML (ref 0.3–4.2)

## 2024-09-10 PROCEDURE — 80053 COMPREHEN METABOLIC PANEL: CPT

## 2024-09-10 PROCEDURE — 82306 VITAMIN D 25 HYDROXY: CPT

## 2024-09-10 PROCEDURE — 36415 COLL VENOUS BLD VENIPUNCTURE: CPT

## 2024-09-10 PROCEDURE — 84443 ASSAY THYROID STIM HORMONE: CPT

## 2024-09-14 LAB
DEPRECATED CALCIDIOL+CALCIFEROL SERPL-MC: <71 UG/L (ref 20–75)
VITAMIN D2 SERPL-MCNC: <5 UG/L
VITAMIN D3 SERPL-MCNC: 66 UG/L

## 2024-09-16 ENCOUNTER — LAB (OUTPATIENT)
Dept: LAB | Facility: CLINIC | Age: 64
End: 2024-09-16
Payer: COMMERCIAL

## 2024-09-16 DIAGNOSIS — T38.0X5A STEROID-INDUCED OSTEOPOROSIS: ICD-10-CM

## 2024-09-16 DIAGNOSIS — Z51.81 ENCOUNTER FOR MEDICATION MONITORING: ICD-10-CM

## 2024-09-16 DIAGNOSIS — Z79.899 LONG-TERM USE OF PLAQUENIL: ICD-10-CM

## 2024-09-16 DIAGNOSIS — M05.79 RHEUMATOID ARTHRITIS INVOLVING MULTIPLE SITES WITH POSITIVE RHEUMATOID FACTOR (H): ICD-10-CM

## 2024-09-16 DIAGNOSIS — C83.30 DIFFUSE LARGE B-CELL LYMPHOMA, UNSPECIFIED BODY REGION (H): ICD-10-CM

## 2024-09-16 DIAGNOSIS — M81.8 STEROID-INDUCED OSTEOPOROSIS: ICD-10-CM

## 2024-09-16 LAB
BASOPHILS # BLD AUTO: 0 10E3/UL (ref 0–0.2)
BASOPHILS NFR BLD AUTO: 1 %
CHOLEST SERPL-MCNC: 162 MG/DL
CRP SERPL-MCNC: 4.46 MG/L
EOSINOPHIL # BLD AUTO: 0.1 10E3/UL (ref 0–0.7)
EOSINOPHIL NFR BLD AUTO: 2 %
ERYTHROCYTE [DISTWIDTH] IN BLOOD BY AUTOMATED COUNT: 12.2 % (ref 10–15)
ERYTHROCYTE [SEDIMENTATION RATE] IN BLOOD BY WESTERGREN METHOD: 4 MM/HR (ref 0–30)
FASTING STATUS PATIENT QL REPORTED: YES
HCT VFR BLD AUTO: 44.5 % (ref 35–53)
HDLC SERPL-MCNC: 53 MG/DL
HGB BLD-MCNC: 15.6 G/DL (ref 11.7–17.7)
IMM GRANULOCYTES # BLD: 0 10E3/UL
IMM GRANULOCYTES NFR BLD: 0 %
LDLC SERPL CALC-MCNC: 90 MG/DL
LYMPHOCYTES # BLD AUTO: 0.7 10E3/UL (ref 0.8–5.3)
LYMPHOCYTES NFR BLD AUTO: 19 %
MCH RBC QN AUTO: 34.8 PG (ref 26.5–33)
MCHC RBC AUTO-ENTMCNC: 35.1 G/DL (ref 31.5–36.5)
MCV RBC AUTO: 99 FL (ref 78–100)
MONOCYTES # BLD AUTO: 0.5 10E3/UL (ref 0–1.3)
MONOCYTES NFR BLD AUTO: 15 %
NEUTROPHILS # BLD AUTO: 2.2 10E3/UL (ref 1.6–8.3)
NEUTROPHILS NFR BLD AUTO: 64 %
NONHDLC SERPL-MCNC: 109 MG/DL
PLATELET # BLD AUTO: 102 10E3/UL (ref 150–450)
RBC # BLD AUTO: 4.48 10E6/UL (ref 3.8–5.9)
TRIGL SERPL-MCNC: 95 MG/DL
WBC # BLD AUTO: 3.4 10E3/UL (ref 4–11)

## 2024-09-16 PROCEDURE — 85025 COMPLETE CBC W/AUTO DIFF WBC: CPT

## 2024-09-16 PROCEDURE — 85652 RBC SED RATE AUTOMATED: CPT

## 2024-09-16 PROCEDURE — 80061 LIPID PANEL: CPT

## 2024-09-16 PROCEDURE — 86140 C-REACTIVE PROTEIN: CPT

## 2024-09-16 PROCEDURE — 36415 COLL VENOUS BLD VENIPUNCTURE: CPT

## 2024-09-16 NOTE — PROGRESS NOTES
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 The following is a comprehensive summary of her Endocrine care to date.      # Osteoporosis     We previously reviewed the osteoporosis dictation template.    DEXA 3/30/2021:  -lumbar spine non diagnostic  -lowest T-score -2.0 at the right femoral neck with an increase in the total mean hip BMD by 6.1% (significant) since 2018     DEXA 8/2/2023:  -lumbar spine non diagnostic  -lowest overall T-score at the hips is -2.3 at the left total hip; BMD at the total mean hip has decreased a significant -4.6% compared to 3/2021     DEXA 9/9/2024:  -lumbar spine non diagnostic  -lowest T-score at the right hip is -2.2 at the right total hip; BMD at the right total hip is stable compared to 8/2/2023   -distal 1/3 radius T-score -3.2 (no comparison to prior)     Thoracic/lumbar spine XR 11/2/2021 negative for vertebral compression fracture.    CXR 1/2023: negative for compression fracture     No prior fractures. She has poor balance. No prior nephrolithiasis.    She had a left hip arthroplasty done 1/25/2024 because of AVN. She has been weight-bearing since shortly after the surgery and is recovering.      She has rheumatoid arthritis and has been on prednisone daily since her mid 30s, initially 3 mg daily and since 2017 she has been on prednisone 5 mg daily before reducing back to 3 mg daily. She had been on methotrexate previously but it was stopped remotely. Outside of chemotherapy for diffuse large B cell lymphoma in 2017 (she has never had radiation therapy, her DLBCL was treated only with chemotherapy in 2017), no high dose glucocorticoid exposure. No other GC exposure outside of prednisone. She has been on apixaban since 2017.       As of 2/2023 Amelia has completed a complete evaluation for secondary causes of increased fracture risk with the following notable findings:  -Has had several  episodes of mild hypercalcemia over the years:      -2017: uncorrected serum calcium 10.2 mg/dL (ULN 10.1) with mildly low albumin, 10/2021: uncorrected serum calcium 10.2 (ULN 10.1 mg/dL) without an albumin, 4/2022: corrected serum calcium (albumin 4.4 g/dL) 10.8 mg/dl (ULN 10.1), 5/24/2022 uncorrected serum calcium 10.8 mg/dL with no albumin; 3/6/2023 uncorrected serum calcium 10.5 (ULN 10.2 mg/dL) without albumin, 3/17/2023: uncorrected serum calcium 10.3 mg/dL (ULN 10.2) with albumin 4.5 g/dL, 4/10/2023 (this was the day of cardiac surgery): uncorrected serum calcium 10.3 mg/dL (ULN 10.2) with albumin 3.4 g/dL and on this day ionized calcium was both high and low; 9/10/2024: uncorrected serum calcium 10.5 mg/dL (ULN 10.4) with albumin 5.0 g/dL   -Both hypo and hyperphosphatemia at times  -Only 2 PTH assays to date: 11/2/2021: corrected serum calcium 10.1 (ULN 10.1 mg/dL) with uncorrected serum calcium 10.2 mg/dL and albumin 4.1 g/dL, PTH 31 (ref range 18 - 80 pg/mL); 2/13/2023:  (ULN 65 pg/mL), uncorrected serum calcium 10.0 mg/dL (ULN 10.2), albumin 4.7 g/dL, GFR 42, 25-OH vitamin D mid normal    -11/5/2021: 24 hour urine calcium undetectable (2.4 L); 2/2023: 24 hour urine (1.6 L) calcium 150 mg   -11/2/2021: bone specific alkaline phosphatase mid to upper pre-menopausal range, B-CTX in the lower premenopausal range    -9/2024: 25-OH vitamin D high normal   -MGUS present on testing 11/2022 (this was ordered by the inpatient team) and I did let her hematologist know of this interim finding     She believes she was on weekly Fosamax from ~ 2005 until 2017. However, this is in contrast to her notes in the medical record that state Fosamax for 6-7 years in the early 2000s. She then restarted Fosamax 70 mg once weekly in 1/2019 and took it until 4/2021. She may have been off of Fosamax for a few months in the spring of 2020. Fosamax has been well tolerated.     11/12/2021: restarted Fosamax and she took it  until we transitioned to Reclast 9/2023 8/29/2023: BMD has declined.  We suspect that this is because of the interruptions in therapy with Fosamax and she was off therapy for some time between her 2 DEXA scans. We will transition to zoledronic acid     Reclast dose #1 9/25/2023 - had flu-like symptoms for a day or 2 afterwards      She takes a calcium - vitamin D combination supplement daily, a separate vitamin D daily, no multivitamin, and has 1-2 servings of calcium daily.     No recent or upcoming dental extraction. No GERD. Complex cardiac history, see cardiology note 3/22/2024, and has mild non-obstructive CAD on angiogram 11/2022. CT imaging has shown atherosclerosis in the abdominal aorta. No prior ASCVD event. Jardiance was prescribed 12/2022 by cardiology given her CHF.      # Thyroid nodularity     CT chest 2019: multinodular thyroid gland with extension into the mediastinum, stable compared with 2015    Thyroid US completed 11/2/2021 (only thyroid US to date) reviewed in detail and I agree with the radiology interpretation. Thyroid gland is normal in size. Multiple sub centimeter nodules with only 1 nodule >= 1 cm and this is a 1.8 cm in maximal dimension nodule in the isthmus that is solid and isoechoic without suspicious features and is TR-3.    Neck US 8/2/2023: I reviewed the images in detail, comparison US used by radiology was 11/2/2021  -RIGHT lobe: 3.1 x 2.6 x 1.0 cm.   -LEFT lobe: 2.8 x 1.2 x 0.9 cm.  -NECK: No cervical lymphadenopathy.  -NODULES:     Nodule 1: Right mid thyroid nodule measuring 5 x 5 x 4 mm, previously 5 x 5 x 4 mm, TR-2 per radiology, on my review it's a pure cyst      Nodule 2: Right mid thyroid nodule measuring 5 x 5 x 4 mm, previously 5 x 4 x 4 mm, TR-2 per radiology, on my review it's a pure cyst     Nodule 3: Left upper pole thyroid nodule measuring 5 x 5 x 3 mm, previously 5 x 5 x 3 mm, TR-1 per radiology, on my review it's a pure cyst     Nodule 4: Left mid pole  thyroid nodule measuring 5 x 4 x 4 mm, previously 5 x 4 x 4 mm, TR-2 per radiology, on my review it's a pure cyst with a small amount of hyperechoic internal debris and is taller than wide as well      Nodule 5: Inferior isthmus nodule measuring 15 x 15 x 10 mm, previously 18 x 17 x 11 mm, I agree with radiology that it's TR-3     No radiation therapy. No FH of thyroid pathology. No FH of thyroid cancer. No prior thyroid biopsy. No prior use of thyroid hormone. No history of abnormal thyroid function testing.    TSH 0.9 9/2024 9/17/2024: no compressive symptoms      # Dysglycemia     Has had HbA1c in the prediabetic range for a few years. Fasting glucose 126 mg/dL 10/13/2021.  mg/dL 10/15/2021. She has not previously been on medication to lower glucose before Jardiance. HbA1c 5.9% 3/2023. Some recent glucose values elevated but <200 mg/dL (except for on 4/10/23, which was the day of cardiac surgery) - unclear if any were fasting, most were probably during her hospitalizations.       12/2023: HbA1c 5.1%,  mg/dL    Jardiance was prescribed 12/2022 by cardiology given her CHF.  Follow-up in this regard per her PCP.    Objective:    BMI 24 kg/m2, /62.  Pleasant and conversational.  Appears mildly cushingoid. Possible 1-2 cm right sided thyroid nodule appreciated. No cervical LAD. Mild kyphosis.    2/2023: BMI 22.7 kg/m2. /62. Appears mildly cushingoid. Thyroid about 20 grams and nodular. No cervical LAD. Dentition in good repair, there is an area of soft tissue ulceration inferior and lateral to the tongue. Mild kyphosis.    Assessment/Plan:    # Osteoporosis, glucocorticoid associated     Proceed with dose #2 of Zoledronic acid 5 mg IV given over 120 minutes, ordered for the end of 9/2024.  We reviewed potential adverse effects in detail including osteonecrosis of the jaw, atypical femoral fracture, and flulike reaction. Reviewed taking Tylenol to reduce the risk of flu-like symptoms.      Continue to monitor serum calcium and if ionized calcium or albumin-corrected calcium is high this will need further investigation.     She will continue to aim for 1200 mg of calcium intake daily via diet and I did give her the handout of calcium sources in foods.  Continue her current dose of vitamin D.    Return to see me in 1 year with labs ordered.     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  She is mildly cushingoid on exam (likely from prior supraphysiologic exposure).    Continue to follow sick day rules.    When sick she will take prednisone 10 mg daily for 3 days or until back to baseline.  In the event that she is unable to take oral prednisone she will inject dexamethasone 4 mg IM and this is prescribed and then proceed to the ER.  In the event of surgery/procedure she should receive IV hydrocortisone 100 mg on-call to the OR followed by a taper and the surgeon can contact me for specific dosing recommendations. Medical alert bracelet that states secondary adrenal insufficiency is also recommended.    # Thyroid nodularity    Will follow with physical exam for now. Check TSH annually, ordered for next year. Tentatively plan a repeat thyroid US ~8/2026. She'll monitor for compressive symptoms.      30 minutes spent on the date of the encounter doing chart review, history and exam, documentation and further activities as noted above.     The longitudinal plan of care for the diagnosis(es)/condition(s) as documented were addressed during this visit. Due to the added complexity in care, I will continue to support Amelia in the subsequent management and with ongoing continuity of care.

## 2024-09-17 ENCOUNTER — OFFICE VISIT (OUTPATIENT)
Dept: ENDOCRINOLOGY | Facility: CLINIC | Age: 64
End: 2024-09-17
Payer: COMMERCIAL

## 2024-09-17 VITALS
HEART RATE: 88 BPM | BODY MASS INDEX: 24.21 KG/M2 | SYSTOLIC BLOOD PRESSURE: 110 MMHG | WEIGHT: 116.9 LBS | DIASTOLIC BLOOD PRESSURE: 62 MMHG

## 2024-09-17 DIAGNOSIS — M06.9 RHEUMATOID ARTHRITIS OF BOTH ANKLES, UNSPECIFIED WHETHER RHEUMATOID FACTOR PRESENT (H): ICD-10-CM

## 2024-09-17 DIAGNOSIS — Z79.52 ON PREDNISONE THERAPY: ICD-10-CM

## 2024-09-17 DIAGNOSIS — N18.30 STAGE 3 CHRONIC KIDNEY DISEASE, UNSPECIFIED WHETHER STAGE 3A OR 3B CKD (H): ICD-10-CM

## 2024-09-17 DIAGNOSIS — E04.2 NONTOXIC MULTINODULAR GOITER: ICD-10-CM

## 2024-09-17 DIAGNOSIS — E27.49 SECONDARY ADRENAL INSUFFICIENCY (H): ICD-10-CM

## 2024-09-17 DIAGNOSIS — C83.30 DIFFUSE LARGE B-CELL LYMPHOMA, UNSPECIFIED BODY REGION (H): ICD-10-CM

## 2024-09-17 DIAGNOSIS — M81.8 STEROID-INDUCED OSTEOPOROSIS: Primary | ICD-10-CM

## 2024-09-17 DIAGNOSIS — T38.0X5A STEROID-INDUCED OSTEOPOROSIS: Primary | ICD-10-CM

## 2024-09-17 PROCEDURE — 99214 OFFICE O/P EST MOD 30 MIN: CPT | Performed by: INTERNAL MEDICINE

## 2024-09-17 PROCEDURE — G2211 COMPLEX E/M VISIT ADD ON: HCPCS | Performed by: INTERNAL MEDICINE

## 2024-09-17 RX ORDER — PREDNISONE 10 MG/1
10 TABLET ORAL DAILY
Qty: 20 TABLET | Refills: 1 | Status: SHIPPED | OUTPATIENT
Start: 2024-09-17

## 2024-09-17 RX ORDER — HEPARIN SODIUM,PORCINE 10 UNIT/ML
5-20 VIAL (ML) INTRAVENOUS DAILY PRN
Status: CANCELLED | OUTPATIENT
Start: 2024-09-25

## 2024-09-17 RX ORDER — ZOLEDRONIC ACID 5 MG/100ML
5 INJECTION, SOLUTION INTRAVENOUS ONCE
Status: CANCELLED
Start: 2024-09-25

## 2024-09-17 RX ORDER — DIPHENHYDRAMINE HYDROCHLORIDE 50 MG/ML
50 INJECTION INTRAMUSCULAR; INTRAVENOUS
Status: CANCELLED
Start: 2024-09-25

## 2024-09-17 RX ORDER — METHYLPREDNISOLONE SODIUM SUCCINATE 125 MG/2ML
125 INJECTION, POWDER, LYOPHILIZED, FOR SOLUTION INTRAMUSCULAR; INTRAVENOUS
Status: CANCELLED
Start: 2024-09-25

## 2024-09-17 RX ORDER — DEXAMETHASONE SODIUM PHOSPHATE 4 MG/ML
INJECTION, SOLUTION INTRA-ARTICULAR; INTRALESIONAL; INTRAMUSCULAR; INTRAVENOUS; SOFT TISSUE
Qty: 2 ML | Refills: 1 | Status: SHIPPED | OUTPATIENT
Start: 2024-09-17

## 2024-09-17 RX ORDER — ALBUTEROL SULFATE 0.83 MG/ML
2.5 SOLUTION RESPIRATORY (INHALATION)
Status: CANCELLED | OUTPATIENT
Start: 2024-09-25

## 2024-09-17 RX ORDER — ALBUTEROL SULFATE 90 UG/1
1-2 AEROSOL, METERED RESPIRATORY (INHALATION)
Status: CANCELLED
Start: 2024-09-25

## 2024-09-17 RX ORDER — HEPARIN SODIUM (PORCINE) LOCK FLUSH IV SOLN 100 UNIT/ML 100 UNIT/ML
5 SOLUTION INTRAVENOUS
Status: CANCELLED | OUTPATIENT
Start: 2024-09-25

## 2024-09-17 RX ORDER — EPINEPHRINE 1 MG/ML
0.3 INJECTION, SOLUTION, CONCENTRATE INTRAVENOUS EVERY 5 MIN PRN
Status: CANCELLED | OUTPATIENT
Start: 2024-09-25

## 2024-09-17 NOTE — LETTER
9/17/2024      Amelia Michel  [address]  [facility] [address]      Dear Colleague,    Thank you for referring your patient, Amelia Michel, to the [facility]. Please see a copy of my visit note below.    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 The following is a comprehensive summary of her Endocrine care to date.      # Osteoporosis     We previously reviewed the osteoporosis dictation template.    DEXA 3/30/2021:  -lumbar spine non diagnostic  -lowest T-score -2.0 at the right femoral neck with an increase in the total mean hip BMD by 6.1% (significant) since 2018     DEXA 8/2/2023:  -lumbar spine non diagnostic  -lowest overall T-score at the hips is -2.3 at the left total hip; BMD at the total mean hip has decreased a significant -4.6% compared to 3/2021     DEXA 9/9/2024:  -lumbar spine non diagnostic  -lowest T-score at the right hip is -2.2 at the right total hip; BMD at the right total hip is stable compared to 8/2/2023   -distal 1/3 radius T-score -3.2 (no comparison to prior)     Thoracic/lumbar spine XR 11/2/2021 negative for vertebral compression fracture.    CXR 1/2023: negative for compression fracture     No prior fractures. She has poor balance. No prior nephrolithiasis.    She had a left hip arthroplasty done 1/25/2024 because of AVN. She has been weight-bearing since shortly after the surgery and is recovering.      She has rheumatoid arthritis and has been on prednisone daily since her mid 30s, initially 3 mg daily and since 2017 she has been on prednisone 5 mg daily before reducing back to 3 mg daily. She had been on methotrexate previously but it was stopped remotely. Outside of chemotherapy for diffuse large B cell lymphoma in 2017 (she has never had radiation therapy, her DLBCL was treated only with chemotherapy in 2017), no high dose glucocorticoid exposure. No  other GC exposure outside of prednisone. She has been on apixaban since 2017.       As of 2/2023 Amelia has completed a complete evaluation for secondary causes of increased fracture risk with the following notable findings:  -Has had several episodes of mild hypercalcemia over the years:      -2017: uncorrected serum calcium 10.2 mg/dL (ULN 10.1) with mildly low albumin, 10/2021: uncorrected serum calcium 10.2 (ULN 10.1 mg/dL) without an albumin, 4/2022: corrected serum calcium (albumin 4.4 g/dL) 10.8 mg/dl (ULN 10.1), 5/24/2022 uncorrected serum calcium 10.8 mg/dL with no albumin; 3/6/2023 uncorrected serum calcium 10.5 (ULN 10.2 mg/dL) without albumin, 3/17/2023: uncorrected serum calcium 10.3 mg/dL (ULN 10.2) with albumin 4.5 g/dL, 4/10/2023 (this was the day of cardiac surgery): uncorrected serum calcium 10.3 mg/dL (ULN 10.2) with albumin 3.4 g/dL and on this day ionized calcium was both high and low; 9/10/2024: uncorrected serum calcium 10.5 mg/dL (ULN 10.4) with albumin 5.0 g/dL   -Both hypo and hyperphosphatemia at times  -Only 2 PTH assays to date: 11/2/2021: corrected serum calcium 10.1 (ULN 10.1 mg/dL) with uncorrected serum calcium 10.2 mg/dL and albumin 4.1 g/dL, PTH 31 (ref range 18 - 80 pg/mL); 2/13/2023:  (ULN 65 pg/mL), uncorrected serum calcium 10.0 mg/dL (ULN 10.2), albumin 4.7 g/dL, GFR 42, 25-OH vitamin D mid normal    -11/5/2021: 24 hour urine calcium undetectable (2.4 L); 2/2023: 24 hour urine (1.6 L) calcium 150 mg   -11/2/2021: bone specific alkaline phosphatase mid to upper pre-menopausal range, B-CTX in the lower premenopausal range    -9/2024: 25-OH vitamin D high normal   -MGUS present on testing 11/2022 (this was ordered by the inpatient team) and I did let her hematologist know of this interim finding     She believes she was on weekly Fosamax from ~ 2005 until 2017. However, this is in contrast to her notes in the medical record that state Fosamax for 6-7 years in the early  2000s. She then restarted Fosamax 70 mg once weekly in 1/2019 and took it until 4/2021. She may have been off of Fosamax for a few months in the spring of 2020. Fosamax has been well tolerated.     11/12/2021: restarted Fosamax and she took it until we transitioned to Reclast 9/2023 8/29/2023: BMD has declined.  We suspect that this is because of the interruptions in therapy with Fosamax and she was off therapy for some time between her 2 DEXA scans. We will transition to zoledronic acid     Reclast dose #1 9/25/2023 - had flu-like symptoms for a day or 2 afterwards      She takes a calcium - vitamin D combination supplement daily, a separate vitamin D daily, no multivitamin, and has 1-2 servings of calcium daily.     No recent or upcoming dental extraction. No GERD. Complex cardiac history, see cardiology note 3/22/2024, and has mild non-obstructive CAD on angiogram 11/2022. CT imaging has shown atherosclerosis in the abdominal aorta. No prior ASCVD event. Jardiance was prescribed 12/2022 by cardiology given her CHF.      # Thyroid nodularity     CT chest 2019: multinodular thyroid gland with extension into the mediastinum, stable compared with 2015    Thyroid US completed 11/2/2021 (only thyroid US to date) reviewed in detail and I agree with the radiology interpretation. Thyroid gland is normal in size. Multiple sub centimeter nodules with only 1 nodule >= 1 cm and this is a 1.8 cm in maximal dimension nodule in the isthmus that is solid and isoechoic without suspicious features and is TR-3.    Neck US 8/2/2023: I reviewed the images in detail, comparison US used by radiology was 11/2/2021  -RIGHT lobe: 3.1 x 2.6 x 1.0 cm.   -LEFT lobe: 2.8 x 1.2 x 0.9 cm.  -NECK: No cervical lymphadenopathy.  -NODULES:     Nodule 1: Right mid thyroid nodule measuring 5 x 5 x 4 mm, previously 5 x 5 x 4 mm, TR-2 per radiology, on my review it's a pure cyst      Nodule 2: Right mid thyroid nodule measuring 5 x 5 x 4 mm,  previously 5 x 4 x 4 mm, TR-2 per radiology, on my review it's a pure cyst     Nodule 3: Left upper pole thyroid nodule measuring 5 x 5 x 3 mm, previously 5 x 5 x 3 mm, TR-1 per radiology, on my review it's a pure cyst     Nodule 4: Left mid pole thyroid nodule measuring 5 x 4 x 4 mm, previously 5 x 4 x 4 mm, TR-2 per radiology, on my review it's a pure cyst with a small amount of hyperechoic internal debris and is taller than wide as well      Nodule 5: Inferior isthmus nodule measuring 15 x 15 x 10 mm, previously 18 x 17 x 11 mm, I agree with radiology that it's TR-3     No radiation therapy. No FH of thyroid pathology. No FH of thyroid cancer. No prior thyroid biopsy. No prior use of thyroid hormone. No history of abnormal thyroid function testing.    TSH 0.9 9/2024 9/17/2024: no compressive symptoms      # Dysglycemia     Has had HbA1c in the prediabetic range for a few years. Fasting glucose 126 mg/dL 10/13/2021.  mg/dL 10/15/2021. She has not previously been on medication to lower glucose before Jardiance. HbA1c 5.9% 3/2023. Some recent glucose values elevated but <200 mg/dL (except for on 4/10/23, which was the day of cardiac surgery) - unclear if any were fasting, most were probably during her hospitalizations.       12/2023: HbA1c 5.1%,  mg/dL    Jardiance was prescribed 12/2022 by cardiology given her CHF.  Follow-up in this regard per her PCP.    Objective:    BMI 24 kg/m2, /62.  Pleasant and conversational.  Appears mildly cushingoid. Possible 1-2 cm right sided thyroid nodule appreciated. No cervical LAD. Mild kyphosis.    2/2023: BMI 22.7 kg/m2. /62. Appears mildly cushingoid. Thyroid about 20 grams and nodular. No cervical LAD. Dentition in good repair, there is an area of soft tissue ulceration inferior and lateral to the tongue. Mild kyphosis.    Assessment/Plan:    # Osteoporosis, glucocorticoid associated     Proceed with dose #2 of Zoledronic acid 5 mg IV given over  120 minutes, ordered for the end of 9/2024.  We reviewed potential adverse effects in detail including osteonecrosis of the jaw, atypical femoral fracture, and flulike reaction. Reviewed taking Tylenol to reduce the risk of flu-like symptoms.     Continue to monitor serum calcium and if ionized calcium or albumin-corrected calcium is high this will need further investigation.     She will continue to aim for 1200 mg of calcium intake daily via diet and I did give her the handout of calcium sources in foods.  Continue her current dose of vitamin D.    Return to see me in 1 year with labs ordered.     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  She is mildly cushingoid on exam (likely from prior supraphysiologic exposure).    Continue to follow sick day rules.    When sick she will take prednisone 10 mg daily for 3 days or until back to baseline.  In the event that she is unable to take oral prednisone she will inject dexamethasone 4 mg IM and this is prescribed and then proceed to the ER.  In the event of surgery/procedure she should receive IV hydrocortisone 100 mg on-call to the OR followed by a taper and the surgeon can contact me for specific dosing recommendations. Medical alert bracelet that states secondary adrenal insufficiency is also recommended.    # Thyroid nodularity    Will follow with physical exam for now. Check TSH annually, ordered for next year. Tentatively plan a repeat thyroid US ~8/2026. She'll monitor for compressive symptoms.      30 minutes spent on the date of the encounter doing chart review, history and exam, documentation and further activities as noted above.     The longitudinal plan of care for the diagnosis(es)/condition(s) as documented were addressed during this visit. Due to the added complexity in care, I will continue to  support Amelia in the subsequent management and with ongoing continuity of care.      Again, thank you for allowing me to participate in the care of your patient.        Sincerely,        Dima Shrestha MD

## 2024-09-19 NOTE — LETTER
5/2/2023      RE: Amelia Michel  [address]  [facility] [address]       Dear Colleague,    Thank you for the opportunity to participate in the care of your patient, Amelia Michel, at the [facility] [place] [facility]. Please see a copy of my visit note below.    CARDIOTHORACIC SURGERY FOLLOW-UP VISIT     Amelia Michel   1960   [id number]      Reason for visit: Post-Op minimally invasive TVR (29mm [facility] Epic) with Dr. Chilango Cope on 4/10/2023    HPI: Amelia Michel is a 62 year old adult seen in clinic for a routine follow-up appointment after surgery.   Patient has past medical history of HTN, HLD, Pulm HTN, RA, large B-cell lymphoma, Pacemaker 2019 for SSS, NSVT s/p ablation Nov 2022, A-flutter, A-fib, CKD III, HFpEF, iatrogenic cushingoid features, osteoporosis, hyponatremia, and hypervolemia with LE edema.  Hospital course was unremarkable. Patient was discharged to home on 4/17/23.    Patient has been doing well since discharge and now returns to clinic for postop visit.    Patient endorses none.     Patient reports incision is healing well.    Patient denies any fever, chills, chest pain, palpitations, edema, SOB, lightheadedness, nausea and vomiting.    Patient is having Normal bowel movements and voiding without problems.    Has been not attending cardiac rehabilitation and that is going to start soon (Thursday).    Patient is back on her PTA Eliquis, no adverse effects.  Weight has been stable overall now, but did gain some weight after leaving the hospital back on PTA Torsemide just once a day.    Blood glucose levels have not been under good control.      PAST MEDICAL HISTORY:  Past Medical History:   Diagnosis Date    Arthritis     Cardiac arrest (H)     History of blood clots 2017    PICC line Right arm     History of chemotherapy     History of transfusion     Hypertension     Neuropathy     Physical  Addended by: KELI CALIXTO on: 9/19/2024 05:01 PM     Modules accepted: Orders     deconditioning     PONV (postoperative nausea and vomiting)     Positive PPD, 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    VSD (ventricular septal defect)        PAST SURGICAL HISTORY:  Past Surgical History:   Procedure Laterality Date    ANESTHESIA CARDIOVERSION N/A 5/17/2017    Procedure: ANESTHESIA CARDIOVERSION;  ANESTHESIA CARDIOVERSION;  Surgeon: GENERIC ANESTHESIA PROVIDER;  Location: [facility] OR    ANESTHESIA CARDIOVERSION  3/12/2018    Procedure: ANESTHESIA CARDIOVERSION;;  Surgeon: GENERIC ANESTHESIA PROVIDER;  Location: [facility]    ANESTHESIA CARDIOVERSION N/A 3/23/2018    Procedure: ANESTHESIA CARDIOVERSION;  Anesthesia Cardioversion ;  Surgeon: GENERIC ANESTHESIA PROVIDER;  Location: [facility] OR    ANESTHESIA CARDIOVERSION N/A 4/12/2018    Procedure: ANESTHESIA CARDIOVERSION;  Cardioversion;  Surgeon: GENERIC ANESTHESIA PROVIDER;  Location: [facility] OR    ARTHRODESIS WRIST  2000    Right wrist    BONE MARROW ASPIRATE &BIOPSY  7/12/2017         BONE MARROW BIOPSY W/ ASPIRATION  07/12/2017    CARDIOVERSION      5/17/17, 3/12/18, 3/23/18, 4/14/18,     COLONOSCOPY N/A 11/19/2019    Procedure: Colonoscopy, With Polypectomy And Biopsy;  Surgeon: Pj Vasques MD;  Location: [facility]    CV CORONARY ANGIOGRAM N/A 11/28/2022    Procedure: Coronary Angiogram;  Surgeon: Sundar Wood MD;  Location:  HEART CARDIAC CATH LAB    CV RIGHT HEART CATH MEASUREMENTS RECORDED N/A 11/28/2022    Procedure: Right Heart Catheterization;  Surgeon: Sundar Wood MD;  Location:  HEART CARDIAC CATH LAB    EP ABLATION PVC N/A 12/1/2022    Procedure: Ablation Premature Ventricular Contractions;  Surgeon: Juan Eaton MD;  Location:  HEART CARDIAC CATH LAB    EP PACEMAKER N/A 12/13/2019    Procedure: EP Pacemaker;  Surgeon: Pj Trent MD;  Location:  HEART CARDIAC CATH LAB    EXCISE LESION UPPER EXTREMITY Left 5/22/2018    Procedure: EXCISE LESION UPPER EXTREMITY;  Left Arm Wound Excision And Closure, Possible Submuscular Transposition of Ulnar  Nerve  (Choice Anesthesia);  Surgeon: Kevin Sheldon MD;  Location: [facility] OR    FOOT SURGERY      4 left and 2 right    FOOT SURGERY      4 Left and 2 Right     IMPLANT PACEMAKER  12/13/2019    Dr China Trent  [facility] Cardiac Cath lab     IMPLANT PACEMAKER      RELEASE CARPAL TUNNEL Bilateral     RELEASE CARPAL TUNNEL BILATERAL      REPAIR VALVE TRICUSPID MINIMALLY INVASIVE N/A 4/10/2023    Procedure: MINIMALLY INVASIVE TRICUSPID VALVE REPLACEMENT USING 29MM [facility] EPIC VALVE, FEMORAL CANNULATION AND RIGHT GROIN CUTDOWN, CARDIOPULMONARY BYPASS, TRANSESOPHAGEAL ECHOCARDIOGRAM PERFORMED BY ANESTHESIA STAFF;  Surgeon: Chilango Cope MD;  Location: [facility]    REPAIR VENTRICULAR SEPTAL DEFECT  1969    TRANSPOSITION ULNAR NERVE (ELBOW) Left 5/22/2018    Procedure: TRANSPOSITION ULNAR NERVE (ELBOW);;  Surgeon: Kevin Sheldon MD;  Location: [facility]    ULNAR NERVE TRANSPOSITION Left 05/22/2018    VSD REPAIR  1969    ZZC FUSION FOOT BONES,SUBTALAR Right 10/20/2020    Procedure: RIGHT SUBTALAR JOINT FUSION AND CALCANEOCUBOID FUSION;  Surgeon: Nemesio Crow MD;  Location: [facility];  Service: Orthopedics       CURRENT MEDICATIONS:   Current Outpatient Medications   Medication    acetaminophen (TYLENOL) 325 MG tablet    alendronate (FOSAMAX) 70 MG tablet    apixaban ANTICOAGULANT (ELIQUIS ANTICOAGULANT) 5 MG tablet    aspirin (ASA) 81 MG chewable tablet    diltiazem ER COATED BEADS (CARDIZEM CD/CARTIA XT) 120 MG 24 hr capsule    docusate sodium (COLACE) 100 MG capsule    empagliflozin (JARDIANCE) 10 MG TABS tablet    gabapentin (NEURONTIN) 100 MG capsule    hydroxychloroquine (PLAQUENIL) 200 MG tablet    loratadine (CLARITIN) 10 MG tablet    magnesium oxide 200 MG TABS    methocarbamol (ROBAXIN) 500 MG tablet    metolazone (ZAROXOLYN) 2.5 MG tablet    multivitamin, therapeutic with minerals (THERA-VIT-M) TABS tablet    pantoprazole (PROTONIX) 40 MG EC tablet    predniSONE (DELTASONE) 1 MG tablet    calcium  carbonate 600 mg-vitamin D 400 units (CALTRATE) 600-400 MG-UNIT per tablet    dexamethasone (DECADRON) 4 MG/ML injection    melatonin 10 MG TABS tablet    predniSONE (DELTASONE) 5 MG tablet    spironolactone (ALDACTONE) 25 MG tablet    Vitamin D (Cholecalciferol) 10 MCG (400 UNIT) TABS     No current facility-administered medications for this visit.       ALLERGIES:      Allergies   Allergen Reactions    Amiodarone Other (See Comments) and Difficulty breathing     Lethargic and had trouble breathing- occurred in 2017    Blood Transfusion Related (Informational Only) Hives     Hives with platelets. Give benadryl premedication.    Metoprolol Other (See Comments)     Pt and  report that metoprolol does not work for her and also reports feeling unwell with this medication. She has been able to tolerate atenolol, which as worked in controlling her HR.     Other [No Clinical Screening - See Comments]      Penicillin Allergy Skin Test not performed, see antimicrobial management team progress note 7/5/17.      Penicillins      Childhood reaction, concern for tongue swelling    Tape [Adhesive Tape] Rash         ROS:  Review of symptoms otherwise negative unless commented about in HPI.     LABS:  Last Basic Metabolic Panel:  Lab Results   Component Value Date     04/26/2023     11/29/2022     06/23/2021      Lab Results   Component Value Date    POTASSIUM 3.7 04/26/2023    POTASSIUM 3.5 04/10/2023    POTASSIUM 3.9 07/20/2022    POTASSIUM 4.2 06/23/2021     Lab Results   Component Value Date    CHLORIDE 92 04/26/2023    CHLORIDE 101 07/20/2022    CHLORIDE 102 06/23/2021     Lab Results   Component Value Date    VIKTORIYA 9.5 04/26/2023    VIKTORIYA 10.0 06/23/2021     Lab Results   Component Value Date    CO2 30 04/26/2023    CO2 28 07/20/2022    CO2 28 06/23/2021     Lab Results   Component Value Date    BUN 26.0 04/26/2023    BUN 31 07/20/2022    BUN 25 06/23/2021     Lab Results   Component Value Date    CR  0.93 04/26/2023    CR 1.05 06/23/2021     Lab Results   Component Value Date    GLC 96 04/26/2023     04/17/2023     07/20/2022    GLC 98 06/23/2021       Last CBC:   Lab Results   Component Value Date    WBC 8.5 04/26/2023    WBC Canceled, Test credited 03/16/2021     Lab Results   Component Value Date    RBC 2.79 04/26/2023    RBC Canceled, Test credited 03/16/2021     Lab Results   Component Value Date    HGB 10.1 04/26/2023    HGB Canceled, Test credited 03/16/2021     Lab Results   Component Value Date    HCT 31.1 04/26/2023    HCT Canceled, Test credited 03/16/2021     No components found for: MCT  Lab Results   Component Value Date     04/26/2023    MCV Canceled, Test credited 03/16/2021     Lab Results   Component Value Date    MCH 36.2 04/26/2023    MCH Canceled, Test credited 03/16/2021     Lab Results   Component Value Date    MCHC 32.5 04/26/2023    MCHC Canceled, Test credited 03/16/2021     Lab Results   Component Value Date    RDW 14.8 04/26/2023    RDW Canceled, Test credited 03/16/2021     Lab Results   Component Value Date     04/26/2023    PLT Canceled, Test credited 03/16/2021       IMAGING:  None    PHYSICAL EXAM:   /78 (BP Location: Right arm, Patient Position: Chair, Cuff Size: Adult Regular)   Pulse 99   Wt 48.5 kg (107 lb)   SpO2 99%   BMI 23.15 kg/m    General: alert and oriented x 3, pleasant, no acute distress, normal mood and affect  Neuro: no focal deficits   CV: S1 S2, no murmurs, rubs or gallops, regular rate and rhythm, no peripheral edema  Pulm: bilateral breath sounds, clear to auscultation, easy work of breathing  Incision: incisions clean dry and intact without erythema, swelling or drainage    PROCEDURES: None       ASSESSMENT/PLAN:  Amelia Michel is a 62 year old year old adult status post TVR who returns to clinic for postop visit.     1. Surgically doing well overall.  Incisions are healing well with no signs of infection. Increasing  activity and strength overall.   2. Hemodynamics are stable. No medication changes were needed today.  3. Follow up with your cardiology team, Elmira Vasquez NP, as scheduled on 5/4/23, with ECHO prior to visit. Dr Eaton on 8/8/23.  4. Follow up with your PCP within 1-2 weeks, if not already seen.  5. Continue Outpatient Cardiac Rehab until completed.   6. Continue sternal precautions for 12 weeks from surgery date.   7. No driving for 2 weeks from surgery date.       The total time spent with the patient was 25 minutes, > 50% of which was spent in counseling.    CC  Gala Stringer         Please do not hesitate to contact me if you have any questions/concerns.     Sincerely,     Cardiovascular Thoracic Surgery

## 2024-09-19 NOTE — PROGRESS NOTES
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  - continue prednisone 5 mg daily, may consider tapering dose, currently will keep the same considering ongoing concerns of (mild) pericardial effusion. Would consider RA dosing of rutixumab going forward if RA disease activity worsens.  - Continue  mg bid; has a plan to get eye exam this summer    Prolonged exposure to steroids:  - Pre-DEXA FRAX score is 14. Should get DEXA this year (once insurance situation permits) to evaluate bone density and consider whether bisphosphonate or alternative is appropriate (had 7-8 years of Fosamax in early ).  - Need to review vitamin D intake: takes 1 caltrate/day, not sure if takes a multivitamin also.   - Vitamin D level ordered for next labs        Follow-up: 3-6 moths    HPI:   Amelia Michel is a 57 year old female with a history of rheumatoid arthritis who presents for follow up. She was last seen by Dr. Cuevas on 3/30/18 where her rheumatoid arthritis appeared to be under good control. Her plan at this time was to continue prednisone 5mg daily and HCQ 200mg twice a day. She was told to have an eye exam and follow up in 3 months. It was  19-Sep-2024 12:40 recommended that she get a DEXA scan to evaluate whether bisphosphonate was indicated (had 7-8 years of Fosamax in early 2000's). Previous use of methotrexate has been linked to her development of a severe neuropathy in the digits of her upper and lower extremities. She has also previously used arava.     Today, the patient reports spraining her ankle prior to her cardiac arrest, which caused some neuropathy in her sprained foot. Therefore she is currently wearing an ankle brace and using a walker. However, her arthritis is doing fine she reports that the colchicine she was recently prescribed has been very effective. She mentions that she is switching insurance companies and had an unexpected arm surgery, which has prevented her from getting an eye exam that she is aware of being overdue for. She was also laid off recently but was able to fill the majority of her medications and made the most of her previous insurance so she should be able to manage for a while. She has undergone multiple CT scans and is currently finished with chemotherapy. Lymphoma is in remission and her heart is doing fine. She is interested in the options she has for medications that manage her rheumatoid arthritis and her lymphoma. She has taken many medications including plaquenil, prednisone, arava, methotrexate, orencia, and rituximab, and has plenty of options to choose from if she wants to lower the cost of prescriptions or avoid unwanted side-effects.    Review of Systems:   Pertinent items are noted in HPI, remainder of complete ROS is negative.      Active Medications:      acetaminophen (TYLENOL) 325 MG tablet, Take 2 tablets (650 mg) by mouth every 4 hours as needed for mild pain, fever or headaches, Disp: , Rfl:      apixaban ANTICOAGULANT (ELIQUIS) 5 MG tablet, Take 1 tablet (5 mg) by mouth 2 times daily, Disp: 180 tablet, Rfl: 3     ascorbic acid 500 MG TABS, Take 1 tablet (500 mg) by mouth daily, Disp: 30 tablet, Rfl: 0      19-Sep-2024 12:14 atenolol (TENORMIN) 25 MG tablet, Take 1 tablet (25 mg) by mouth 2 times daily, Disp: 60 tablet, Rfl: 3     calcium polycarbophil (FIBERCON) 625 MG tablet, Take 1 tablet by mouth 2 times daily, Disp: , Rfl:      calcium-vitamin D (CALTRATE) 600-400 MG-UNIT per tablet, Take 2 tablets by mouth every morning, Disp: 60 tablet, Rfl: 0     colchicine 0.6 MG tablet, Take 1 tablet (0.6 mg) by mouth daily, Disp: 60 tablet, Rfl: 0     digoxin (LANOXIN) 125 MCG tablet, Take 250mcg daily for 2 days, then decrease to 125mcg daily, Disp: 90 tablet, Rfl: 3     furosemide (LASIX) 20 MG tablet, Take 1 tablet (20 mg) by mouth daily, Disp: 90 tablet, Rfl: 3     gabapentin (NEURONTIN) 100 MG capsule, TAKE TWO CAPSULES BY MOUTH THREE TIMES A DAY, Disp: 180 capsule, Rfl: 3     HYDROCORTISONE PO, Take 100 mg by mouth IV, Disp: , Rfl:      hydroxychloroquine (PLAQUENIL) 200 MG tablet, Take 1 tablet (200 mg) by mouth 2 times daily, Disp: 60 tablet, Rfl: 5     multivitamin, therapeutic with minerals (THERA-VIT-M) TABS tablet, Take 1 tablet by mouth daily, Disp: 30 each, Rfl: 0     order for DME, Equipment being ordered: Thigh high compression stockings Class 2: off the shelf or custom, farrow foot wrap and juxta fit premium lower leg wrap, lymphedema bandaging supplies 3 sets, Disp: 1 Units, Rfl: 1     order for DME, Equipment being ordered: BP cuff, Disp: 1 Device, Rfl: 1     oxyCODONE IR (ROXICODONE) 5 MG tablet, Take 1-2 tablets (5-10 mg) by mouth every 4 hours as needed for moderate to severe pain or other (Patient not taking: Reported on 5/31/2018), Disp: 30 tablet, Rfl: 0     predniSONE (DELTASONE) 5 MG tablet, Take 1 tablet (5 mg) by mouth daily, Disp: 90 tablet, Rfl: 2     spironolactone (ALDACTONE) 25 MG tablet, Take 1 tablet (25 mg) by mouth daily, Disp: 30 tablet, Rfl: 3     sulfamethoxazole-trimethoprim (BACTRIM DS/SEPTRA DS) 800-160 MG per tablet, Take 1 tablet by mouth 2 times daily (Patient not taking: Reported on 5/31/2018),  "Disp: 14 tablet, Rfl: 0     tolterodine (DETROL LA) 4 MG 24 hr capsule, Take 1 capsule (4 mg) by mouth daily, Disp: 30 capsule, Rfl: 11      Allergies:   Blood transfusion related (informational only); Metoprolol; No clinical screening - see comments; Penicillins; and Tape [adhesive tape]      Past Medical History:  Antiplatelet or antithrombotic long-term use  Arrhythmia  Atrial tachycardia, flutter  Paroxysmal atrial fibrillation  Arthritis  Lymphoma  Cardiac arrest  history of chemotherapy  Primary pulmonary hypertension  Neuropathy  Postoperative nausea and vomiting  Rheumatoid arthritis  Hyperlipidemia LDL goal <130  Lymphedema of both lower extremities  Cardiogenic shock  Acute respiratory failure with hypoxia  Critical illness myopathy  Sepsis  Wound of left upper extremity  Diffuse large B-cell lymphoma of extranodal site  Febrile neutropenia  Physical deconditioning  Palpitations     Past Surgical History:  Anesthesia cardioversion  Right wrist arthrodesis  Bone marrow aspirate & biopsy  Excise lesion upper extremity - left ar wound excision and closure, possible submuscular transposition of ulnar nerve  Foot surgery - 4 left, 2 right  Carpal tunnel bilateral release  Repair ventricular septal defect  Transpositional ulnar nerve (elbow)    Family History:   Mother - breast cancer, hypertension, alzheimer disease  Father - hypertension, lymphoma, circulatory A fib  Paternal grandmother - diabetes     Social History:   No smoking or tobacco use  No alcohol use     Physical Exam:   /72  Pulse 52  Ht 1.473 m (4' 10\")  Wt 51.4 kg (113 lb 6.4 oz)  SpO2 98%  BMI 23.7 kg/m2   Wt Readings from Last 4 Encounters:   05/31/18 51.4 kg (113 lb 6.4 oz)   05/24/18 51.6 kg (113 lb 12.8 oz)   05/22/18 51.5 kg (113 lb 8.6 oz)   05/17/18 51.7 kg (113 lb 14.4 oz)   Constitutional: Well-developed, appearing stated age; cooperative  Eyes: Normal EOM, PERRLA, vision, conjunctiva, sclera  ENT: Normal external ears, nose, " hearing, lips, teeth, gums  Neck: No mass or thyroid enlargement    : Not tested  Lymph: No cervical, supraclavicular, or epitrochlear nodes  MS:  No active synovitis. Has subluxation of MCP's 2-4 bilaterally. Diminished flexion greater than extension left wrist. Right wrist partially fused.   Skin: No nail pitting, alopecia, rash, nodules or lesions; + hair loss  Neuro: + chronic polyneuropathy with deminished sensation in BL LE.   Psych: Normal judgement, orientation, memory, affect.     Laboratory:   I reviewed the following labs:  Last CBC:  Lab Results   Component Value Date    WBC 3.2 05/24/2018    WBC 2.4 05/17/2018    WBC 3.4 03/23/2018     Lab Results   Component Value Date    RBC 4.13 05/24/2018     Lab Results   Component Value Date    HGB 15.0 05/24/2018    HGB 15.1 05/22/2018    HGB 15.7 05/17/2018     Lab Results   Component Value Date    HCT 42.7 05/24/2018     No components found for: MCT  Lab Results   Component Value Date     05/24/2018     Lab Results   Component Value Date    MCH 36.3 05/24/2018     Lab Results   Component Value Date    MCHC 35.1 05/24/2018     Lab Results   Component Value Date    RDW 12.4 05/24/2018     Lab Results   Component Value Date     05/24/2018     05/17/2018    PLT 87 03/23/2018       Last Basic Metabolic Panel:  Lab Results   Component Value Date     05/24/2018      Lab Results   Component Value Date    POTASSIUM 3.7 05/24/2018     Lab Results   Component Value Date    CHLORIDE 104 05/24/2018     Lab Results   Component Value Date    VIKTORIYA 8.2 05/24/2018     Lab Results   Component Value Date    CO2 20 05/24/2018     Lab Results   Component Value Date    BUN 18 05/24/2018     Lab Results   Component Value Date    CR 0.92 05/24/2018     Lab Results   Component Value Date    GLC 97 05/24/2018       No results found for: CKTOTAL  TSH   Date Value Ref Range Status   05/15/2017 1.03 0.40 - 4.00 mU/L Final   ]  Lab Results   Component Value Date     URIC 6.5 03/11/2018    URIC 2.3 08/07/2017    URIC 3.5 08/04/2017     Lab Results   Component Value Date    CRP 63.0 03/08/2018    CRP 6.7 01/12/2018    CRP 83.0 07/14/2017       Liver Function Studies -   Recent Labs   Lab Test  05/24/18   1506  05/17/18   1225  03/23/18   0945   PROTTOTAL  6.2*  7.9  6.2*   ALBUMIN  3.4  4.4  3.7   BILITOTAL  0.6  1.0  0.7   ALKPHOS  139  204*  136   AST  33  43  28   ALT  38  46  57*       UA RESULTS:  Recent Labs   Lab Test  03/01/18   0930 03/01/18   COLOR  Yellow  Yellow   APPEARANCE  Slightly Cloudy  Clear   URINEGLC  Negative  Neg   URINEBILI  Negative  Neg   URINEKETONE  Negative  Neg   SG  1.004  1.005   UBLD  Large*  Large   URINEPH  6.0  5.5   PROTEIN  30*  30    UROBILINOGEN   --   0.2   NITRITE  Negative  Neg   LEUKEST  Small*  Small   RBCU  3*   --    WBCU  23*   --        Autoimmunity labs:  Lab Results   Component Value Date    RHF <20 06/10/2017     Lab Results   Component Value Date    CCPIGG 331 (H) 06/10/2017       No results found for: ANCA  Lab Results   Component Value Date    H9RNQSA 113 06/10/2017    E9KRGKU 131 04/27/2016     Lab Results   Component Value Date    N4ENMEA 25 06/10/2017     Lab Results   Component Value Date    MARYANA <1.0  Interpretation:  Negative   06/10/2017     Lab Results   Component Value Date    DNA 3 06/10/2017     Lab Results   Component Value Date    RNPIGG  06/10/2017     <0.2  Negative   Antibody index (AI) values reflect qualitative changes in antibody   concentration that cannot be directly associated with clinical condition or   disease state.      SMIGG  06/10/2017     <0.2  Negative   Antibody index (AI) values reflect qualitative changes in antibody   concentration that cannot be directly associated with clinical condition or   disease state.      SSAIGG  06/10/2017     <0.2  Negative   Antibody index (AI) values reflect qualitative changes in antibody   concentration that cannot be directly associated with clinical  condition or   disease state.      SSBIGG  06/10/2017     <0.2  Negative   Antibody index (AI) values reflect qualitative changes in antibody   concentration that cannot be directly associated with clinical condition or   disease state.      SCLIGG  06/10/2017     <0.2  Negative   Antibody index (AI) values reflect qualitative changes in antibody   concentration that cannot be directly associated with clinical condition or   disease state.         Scribe Disclosure:   I, Cristóbal Michele, am serving as a scribe to document services personally performed by Nayeli Pritchett MD at this visit, based upon the provider's statements to me. All documentation has been reviewed by the aforementioned provider prior to being entered into the official medical record.     Portions of this medical record were completed by a scribe. UPON MY REVIEW AND AUTHENTICATION BY ELECTRONIC SIGNATURE, this confirms (a) I performed the applicable clinical services, and (b) the record is accurate.        I reviewed and edited the above scribed note to reflect my findings and plan.     I discussed the findings and recommendations with the patient.  Recommendations were discussed with the consulting team.  Time spent: 40 minutes    Nayeli Pritchett MD, MS  Rheumatology Attending Physician  pgr [phone]

## 2024-09-25 DIAGNOSIS — I50.30 HEART FAILURE WITH PRESERVED EJECTION FRACTION, NYHA CLASS I (H): Primary | ICD-10-CM

## 2024-10-01 ENCOUNTER — INFUSION THERAPY VISIT (OUTPATIENT)
Dept: INFUSION THERAPY | Facility: HOSPITAL | Age: 64
End: 2024-10-01
Payer: COMMERCIAL

## 2024-10-01 VITALS
TEMPERATURE: 97.8 F | HEART RATE: 77 BPM | RESPIRATION RATE: 18 BRPM | WEIGHT: 119.2 LBS | DIASTOLIC BLOOD PRESSURE: 66 MMHG | BODY MASS INDEX: 24.68 KG/M2 | OXYGEN SATURATION: 98 % | SYSTOLIC BLOOD PRESSURE: 104 MMHG

## 2024-10-01 DIAGNOSIS — M81.8 STEROID-INDUCED OSTEOPOROSIS: Primary | ICD-10-CM

## 2024-10-01 DIAGNOSIS — T38.0X5A STEROID-INDUCED OSTEOPOROSIS: Primary | ICD-10-CM

## 2024-10-01 PROCEDURE — 258N000003 HC RX IP 258 OP 636: Performed by: INTERNAL MEDICINE

## 2024-10-01 PROCEDURE — 250N000011 HC RX IP 250 OP 636: Performed by: INTERNAL MEDICINE

## 2024-10-01 PROCEDURE — 96366 THER/PROPH/DIAG IV INF ADDON: CPT

## 2024-10-01 PROCEDURE — 96365 THER/PROPH/DIAG IV INF INIT: CPT

## 2024-10-01 RX ORDER — ZOLEDRONIC ACID 5 MG/100ML
5 INJECTION, SOLUTION INTRAVENOUS ONCE
Status: COMPLETED | OUTPATIENT
Start: 2024-10-01 | End: 2024-10-01

## 2024-10-01 RX ORDER — ALBUTEROL SULFATE 90 UG/1
1-2 AEROSOL, METERED RESPIRATORY (INHALATION)
Start: 2025-10-01

## 2024-10-01 RX ORDER — ALBUTEROL SULFATE 0.83 MG/ML
2.5 SOLUTION RESPIRATORY (INHALATION)
Status: DISCONTINUED | OUTPATIENT
Start: 2024-10-01 | End: 2024-10-01 | Stop reason: HOSPADM

## 2024-10-01 RX ORDER — DIPHENHYDRAMINE HYDROCHLORIDE 50 MG/ML
50 INJECTION INTRAMUSCULAR; INTRAVENOUS
Start: 2025-10-01

## 2024-10-01 RX ORDER — METHYLPREDNISOLONE SODIUM SUCCINATE 125 MG/2ML
125 INJECTION, POWDER, LYOPHILIZED, FOR SOLUTION INTRAMUSCULAR; INTRAVENOUS
Status: DISCONTINUED | OUTPATIENT
Start: 2024-10-01 | End: 2024-10-01 | Stop reason: HOSPADM

## 2024-10-01 RX ORDER — ALBUTEROL SULFATE 0.83 MG/ML
2.5 SOLUTION RESPIRATORY (INHALATION)
OUTPATIENT
Start: 2025-10-01

## 2024-10-01 RX ORDER — ALBUTEROL SULFATE 90 UG/1
1-2 AEROSOL, METERED RESPIRATORY (INHALATION)
Status: DISCONTINUED | OUTPATIENT
Start: 2024-10-01 | End: 2024-10-01 | Stop reason: HOSPADM

## 2024-10-01 RX ORDER — HEPARIN SODIUM,PORCINE 10 UNIT/ML
5-20 VIAL (ML) INTRAVENOUS DAILY PRN
OUTPATIENT
Start: 2025-10-01

## 2024-10-01 RX ORDER — ZOLEDRONIC ACID 5 MG/100ML
5 INJECTION, SOLUTION INTRAVENOUS ONCE
Start: 2025-10-01

## 2024-10-01 RX ORDER — DIPHENHYDRAMINE HYDROCHLORIDE 50 MG/ML
50 INJECTION INTRAMUSCULAR; INTRAVENOUS
Status: DISCONTINUED | OUTPATIENT
Start: 2024-10-01 | End: 2024-10-01 | Stop reason: HOSPADM

## 2024-10-01 RX ORDER — HEPARIN SODIUM (PORCINE) LOCK FLUSH IV SOLN 100 UNIT/ML 100 UNIT/ML
5 SOLUTION INTRAVENOUS
OUTPATIENT
Start: 2025-10-01

## 2024-10-01 RX ORDER — EPINEPHRINE 1 MG/ML
0.3 INJECTION, SOLUTION INTRAMUSCULAR; SUBCUTANEOUS EVERY 5 MIN PRN
OUTPATIENT
Start: 2025-10-01

## 2024-10-01 RX ORDER — METHYLPREDNISOLONE SODIUM SUCCINATE 125 MG/2ML
125 INJECTION, POWDER, LYOPHILIZED, FOR SOLUTION INTRAMUSCULAR; INTRAVENOUS
Start: 2025-10-01

## 2024-10-01 RX ORDER — EPINEPHRINE 1 MG/ML
0.3 INJECTION, SOLUTION INTRAMUSCULAR; SUBCUTANEOUS EVERY 5 MIN PRN
Status: DISCONTINUED | OUTPATIENT
Start: 2024-10-01 | End: 2024-10-01 | Stop reason: HOSPADM

## 2024-10-01 RX ADMIN — ZOLEDRONIC ACID 5 MG: 5 INJECTION, SOLUTION INTRAVENOUS at 12:06

## 2024-10-01 RX ADMIN — SODIUM CHLORIDE 50 ML: 900 INJECTION INTRAVENOUS at 14:16

## 2024-10-01 NOTE — PROGRESS NOTES
[facility] Cardiology   CORE Clinic      HPI:   Ms. Michel is a 63 year old female with medical history pertinent for HFpEF, mildly dilated and reduced RV function, severe TR (2/2 failure of leaflet coaptation) s/p TVR 4/10/23, atrial flutter/fibrillation (on apixaban and rate control strategy), cardiac arrest (2017 likely 2/2 malignant involvement of the pericardium c/b organizing pericardial effusion), SSS s/p ppm (2019), VSD (s/p repair 1969), and DLBCL (s/p CHOP therapy and in remission). She was recently admitted 11/19/-12/5/22 with ADHF and frequent NSVT, now s/p VT ablation. She [place] [facility] [place] 6 [place] follow up.     With regards to her cardiac conditions, as noted, Ms. Michel presented to [facility] on 11/19 with MONTALVO, BLE edema, and 8 pound weight gain over the course of one week. Chest X-ray was suspicious for pulmonary edema, NT-P BNP 1,913, Troponin T 21-->12. EDW per chart review 116-120; admission weight 128. She was admitted w/ decompensated diastolic heart failure with pictutre c/b frequent non sustained ventricular tachycardia with RHC on 11/28/22 notable for cardiogenic shock with a cardiac index of 1.2 and CO of 1.6. Etiology thought to most likely secondary to frequent non sustained ventricular tachycardia in the setting of possible restrictive/constrictive physiology given her malignancy history (albeit presumably in remission). CAD ruled on by coronary angiogram completed on 11/28/22. She underwent VT ablation on 12/1/22 and had successful resumption of her home medications with increased diuretic dosing and adddition of an SGLT2 inhibitor. She was diuresed 19 lbs to a weight of 109 lbs at time of discharge.     At CORE visit on 1/9/23 Ms. Michel was hypervolemic as  evident by weight gain, tachycardia, and peripheral edema. Review of labs demonstrated persistent hyponatremia with Na 127 and bump in Cr to 1.35. Lasix was increase 60 mg BID. Still awaiting for PA for cardiomems device. Admitted  1/24-1/29 for CHF exacerbation. Diuresed and switched to torsemide 40 mg BID. Discharged at a weight of 107 lb.     Device interrogation from 2/7 with rates 120s-140, AFL. Diltiazem increased to 240 mg daily. Since our last CORE visit in 2/2023 she has resumed torsemide 20 mg BID and spironolactone 12.5 mg daily. She was evaluated by Dr. Cope for severe TR. Admitted 4/10-4/17/23 for elective TVR with Dr. Cope.     CORE visit in 5/2023, Ms. Michel was feeling well. Weights are very stable at 103-104 lbs. Echo at that time showed preserved LVEF and mildly improved RV function. Diltiazem was increased to 180 mg daily for elevated heart rates. Started on spironolactone. CORE visit 9/2023 feeling well. Weight stable at 108 lb. No medication changes.   Underwent hip surgery in January 2024.     Today, Ms. Michel reports feeling well. No cardiac concerns. She is without peripheral edema, worsening dyspnea on exertion, orthopnea, or PND.  Denies chest pain, SOB, palpitations, lightheadedness, syncope, presyncope. Remains on eliquis for CVA prophylaxis and ASA 81 mg with new valve. No s/s of bleeding complications. She has steadily gained weight over the summer, ~ 2-3 lbs/month. She is now wearing a right ankle brace and has been somewhat less active due to ortho/mobility issues. She also reports that she has been eating a lot of sourdough bread. Feeling less motivated to exercise.     Cardiac Medications:   - ASA 81 mg daily  - Eliquis 5 mg BID  - Diltiazem 240 mg daily   - Jardiance 10 mg daily   - Torsemide 20 mg daily  - KCL 60 mEq daily    - Spironolactone 12.5 mg daily      PAST MEDICAL HISTORY:  Past Medical History:   Diagnosis Date    Antiplatelet or antithrombotic long-term use     Arrhythmia     Arthritis     Atrial fibrillation (H)     Atrial flutter (H)     Cancer (H) June 2017    DLBCL currently in remission    Cardiac arrest (H) 2017    Chronic kidney disease     Congestive heart failure (H)     Diffuse  large B-cell lymphoma of extranodal site (H) 2017    Extremity restricted from procedure     Left arm    Fall 12/06/2023    Cut on head    H/O blood transfusion reaction     Hives with platelet transfusion, needs pre-medication with tylenol and benadryl    Heart murmur     History of blood clots 2017    PICC line Right arm     History of chemotherapy     History of transfusion     Hypertension     Lymphedema of both lower extremities     wears lymphedema socks    Neuropathy     Feet and hands    NSVT (nonsustained ventricular tachycardia) (H) 12/2022    Pacemaker 2019    Pericardial effusion 2017    Physical deconditioning     PONV (postoperative nausea and vomiting)     Positive PPD, 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    Prediabetes     Pulmonary hypertension (H)     RA (rheumatoid arthritis) (H)     SSS (sick sinus syndrome) (H) 2019    Pacemaker    Steroid-induced osteoporosis     Thrombocytopenia (H)     VSD (ventricular septal defect)        FAMILY HISTORY:  Family History   Problem Relation Age of Onset    Breast Cancer Mother         60s    Hypertension Mother     Alzheimer Disease Mother     Cerebrovascular Disease Mother     Hypertension Father     Cerebrovascular Disease Father     Atrial fibrillation Father     Lymphoma Father     Prostate Cancer Father     Other Cancer Father         some form of Lymphoma    Alzheimer Disease Maternal Grandmother         likely    Arthritis Maternal Grandfather     Pneumonia Maternal Grandfather     Diabetes Paternal Grandmother     Mental Illness Sister         early onset  alzheimers       SOCIAL HISTORY:  Social History     Socioeconomic History    Marital status:      Spouse name: Romeo Michel    Number of children: 0   Occupational History     Employer: [employer]   Tobacco Use    Smoking status: Never    Smokeless tobacco: Never   Substance and Sexual Activity    Alcohol use: Not Currently     Comment: none    Drug use: No   Other Topics Concern     Parent/sibling w/ CABG, MI or angioplasty before 65F 55M? No     Social Determinants of Health     Intimate Partner Violence: Not At Risk    Fear of Current or Ex-Partner: No    Emotionally Abused: No    Physically Abused: No    Sexually Abused: No       CURRENT MEDICATIONS:  Current Outpatient Medications   Medication Sig Dispense Refill    acetaminophen (TYLENOL) 325 MG tablet Take 3 tablets (975 mg) by mouth every 8 hours      aspirin (ASA) 81 MG chewable tablet 1 tablet (81 mg) by Oral or NG Tube route daily 30 tablet 2    calcium carbonate 600 mg-vitamin D 400 units (CALTRATE) 600-400 MG-UNIT per tablet Take 2 tablets by mouth daily      dexAMETHasone (DECADRON) 4 MG/ML injection Inject 4 mg for adrenal crisis 2 mL 1    diltiazem ER (DILT-XR) 240 MG 24 hr ER beaded capsule TAKE 1 CAPSULE(240 MG) BY MOUTH DAILY 90 capsule 2    docusate sodium (COLACE) 100 MG capsule Take 100 mg by mouth 2 times daily as needed for constipation      ELIQUIS ANTICOAGULANT 5 MG tablet TAKE 1 TABLET(5 MG) BY MOUTH TWICE DAILY 180 tablet 3    empagliflozin (JARDIANCE) 10 MG TABS tablet Take 1 tablet (10 mg) by mouth daily (Patient taking differently: Take 10 mg by mouth daily. Stopping 1/20/24 before surgery) 90 tablet 3    gabapentin (NEURONTIN) 100 MG capsule Take 100 mg by mouth 2 times daily AM and Afternoon      gabapentin (NEURONTIN) 100 MG capsule Take 200 mg by mouth at bedtime      hydroxychloroquine (PLAQUENIL) 200 MG tablet Take 1 tablet (200 mg) by mouth daily 90 tablet 1    loratadine (CLARITIN) 10 MG tablet Take 10 mg by mouth daily as needed      magnesium oxide 200 MG TABS Take 200 mg by mouth daily bedtime      potassium chloride timmy ER (KLOR-CON M20) 20 MEQ CR tablet Take 3 tablets (60 mEq) by mouth daily 270 tablet 3    predniSONE (DELTASONE) 1 MG tablet Take 3 tablets (3 mg) by mouth daily - Oral 270 tablet 1    predniSONE (DELTASONE) 10 MG tablet Take 1 tablet (10 mg) by mouth daily. 20 tablet 1     spironolactone (ALDACTONE) 25 MG tablet Take 0.5 tablets (12.5 mg) by mouth daily 90 tablet 1    sulfamethoxazole-trimethoprim (BACTRIM DS) 800-160 MG tablet Take 1 tablet by mouth 2 times daily 4 tablet 5    Tocilizumab (ACTEMRA ACTPEN) 162 MG/0.9ML SOAJ Inject 162 mg Subcutaneous every 14 days 1.8 mL 3    torsemide (DEMADEX) 20 MG tablet Take 1 tablet (20 mg) by mouth daily 90 tablet 3    Vitamin D (Cholecalciferol) 10 MCG (400 UNIT) TABS Take 1 tablet by mouth daily       No current facility-administered medications for this visit.     Facility-Administered Medications Ordered in Other Visits   Medication Dose Route Frequency Provider Last Rate Last Admin    albuterol (PROVENTIL HFA/VENTOLIN HFA) inhaler  1-2 puff Inhalation Once PRN Dima Shrestha MD        albuterol (PROVENTIL) neb solution 2.5 mg  2.5 mg Nebulization Once PRN Dima Shrestha MD        diphenhydrAMINE (BENADRYL) injection 50 mg  50 mg Intravenous Once PRN Dima Shrestha MD        EPINEPHrine (Anaphylaxis) (ADRENALIN) injection (vial) 0.3 mg  0.3 mg Intramuscular Q5 Min PRN Dima Shrestha MD        famotidine (PEPCID) injection 20 mg  20 mg Intravenous Once PRN Dima Shrestha MD        methylPREDNISolone Na Suc (solu-MEDROL) injection 125 mg  125 mg Intravenous Once PRN Dima Shrestha MD        sodium chloride (PF) 0.9% PF flush 3-20 mL  3-20 mL Intracatheter q1 min prn Dima Shrestha MD        sodium chloride 0.9% BOLUS 250 mL  250 mL Intravenous Once Dima Shrestha MD        sodium chloride 0.9% BOLUS 500 mL  500 mL Intravenous Once PRN Dima Shrestha MD        zoledronic acid (RECLAST) infusion 5 mg  5 mg Intravenous Once Dima Shrestha MD 50 mL/hr at 10/01/24 1206 5 mg at 10/01/24 1206       ROS:   Refer to HPI    EXAM:  /81 (BP Location: Right arm, Patient Position: Chair, Cuff Size: Adult Regular)   Pulse 97   Wt 55.3 kg (121 lb 14.4 oz)   SpO2 97%   BMI 25.24 kg/m      GENERAL: Appears comfortable, in no acute distress.   HEENT: Eye symmetrical, no discharge or icterus bilaterally. Mucous membranes moist and without lesions.  CV: tachycardic, irregular, +S1S2, systolic murmur, no rub, or gallop. JVP at clavicle    RESPIRATORY: Respirations regular, even, and unlabored. Lungs CTA throughout.   GI: Soft and non distended with normoactive bowel sounds present in all quadrants. No tenderness, rebound, guarding. No hepatomegaly.   EXTREMITIES: No peripheral edema. 2+ bilateral pedal pulses.   NEUROLOGIC: Alert and oriented x 3. No focal deficits.   MUSCULOSKELETAL: No joint swelling or tenderness.   SKIN: No jaundice. No rashes or lesions.     Labs, reviewed with patient in clinic today:  CBC RESULTS:  Lab Results   Component Value Date    WBC 3.4 (L) 09/16/2024    WBC Canceled, Test credited 03/16/2021    RBC 4.48 09/16/2024    RBC Canceled, Test credited 03/16/2021    HGB 15.6 09/16/2024    HGB Canceled, Test credited 03/16/2021    HCT 44.5 09/16/2024    HCT Canceled, Test credited 03/16/2021    MCV 99 09/16/2024    MCV Canceled, Test credited 03/16/2021    MCH 34.8 (H) 09/16/2024    MCH Canceled, Test credited 03/16/2021    MCHC 35.1 09/16/2024    MCHC Canceled, Test credited 03/16/2021    RDW 12.2 09/16/2024    RDW Canceled, Test credited 03/16/2021     (L) 09/16/2024    PLT Canceled, Test credited 03/16/2021       CMP RESULTS:  Lab Results   Component Value Date     09/10/2024     (L) 11/29/2022     06/23/2021    POTASSIUM 3.5 09/10/2024    POTASSIUM 3.5 04/10/2023    POTASSIUM 3.9 07/20/2022    POTASSIUM 4.2 06/23/2021    CHLORIDE 92 (L) 09/10/2024    CHLORIDE 101 07/20/2022    CHLORIDE 102 06/23/2021    CO2 30 (H) 09/10/2024    CO2 28 07/20/2022    CO2 28 06/23/2021    ANIONGAP 14 09/10/2024    ANIONGAP 7 07/20/2022    ANIONGAP 6 06/23/2021     (H) 09/10/2024     (H) 01/24/2024     (H) 07/20/2022    GLC 98 06/23/2021    BUN 31.2  (H) 09/10/2024    BUN 31 (H) 07/20/2022    BUN 25 06/23/2021    CR 1.25 (H) 09/10/2024    CR 1.05 (H) 06/23/2021    GFRESTIMATED 48 (L) 09/10/2024    GFRESTIMATED 57 (L) 06/23/2021    GFRESTBLACK 67 06/23/2021    VIKTORIYA 10.5 (H) 09/10/2024    VIKTORIYA 10.5 (H) 09/10/2024    VIKTORIYA 10.0 06/23/2021    BILITOTAL 0.9 09/10/2024    BILITOTAL 1.2 06/23/2021    ALBUMIN 5.0 09/10/2024    ALBUMIN 4.4 04/20/2022    ALBUMIN 4.2 06/23/2021    ALKPHOS 99 09/10/2024    ALKPHOS 136 06/23/2021    ALT 27 09/10/2024    ALT 41 06/23/2021    AST 41 09/10/2024    AST 36 06/23/2021        INR RESULTS:  Lab Results   Component Value Date    INR 1.71 (H) 04/10/2023    INR 3.42 (H) 03/07/2018       Lab Results   Component Value Date    MAG 2.0 04/16/2023    MAG 1.9 04/12/2018     Lab Results   Component Value Date    NTBNPI 2,067 (H) 01/24/2023    NTBNPI 485 12/12/2019     Lab Results   Component Value Date    NTBNP 1,425 (H) 10/13/2021    NTBNP 1,274 (H) 05/26/2021       Cardiac Diagnostics:    8/5/2024 Echo  Device: Medtronic [id number] [place] XT DR MRI  Normal Device Function  Mode: DDDR  bpm  AP: 79.8%  : 98.6%  Presenting EGM: AP/ @ 75 bpm  Short V-V intervals: 0  Lead Trends Appear Stable  Estimated battery longevity to RRT = 8.8 years. Battery voltage = 2.99 V.   Atrial Arrhythmia: 88 AT/AF episodes recorded.  AF Maurice: 4.5%  Anticoagulant: Eliquis  Ventricular Arrhythmia: 12 NSVT episodes recorded - 1-3 seconds, 176-194 bpm.    5/24/2023 Echo   Interpretation Summary  Tricuspid valve replacement with 29mm [place] Epic.  Doppler interrogation of the tricspid valve is normal.  Global and regional left ventricular function is normal with an EF of 55-60%.  The right ventricle is normal size. Global right ventricular function is  mildly to moderately reduced.  Severe biatrial enlargement is present.  Mild to moderate mitral insufficiency is present.  IVC diameter >2.1 cm collapsing <50% with sniff suggests a high RA pressure  estimated at 15  mmHg or greater.  No pericardial effusion is present.  Compared to prior, RV appears slightly better, CVP is higher now.    4/17/2023 Echo   Interpretation Summary  Minimally invasive tricuspid valve replacement with 29mm [place]io Epic. Doppler  interrogation of the tricspid valve is normal. TV mean gradient 4-6 mmHg.  Moderate right ventricular dilation is present. Global right ventricular  function is mildly to moderately reduced.  Global and regional left ventricular function is normal with an EF of 60-65%.  Mild to moderate mitral insufficiency is present.  IVC diameter <2.1 cm collapsing >50% with sniff suggests a normal RA pressure  of 3 mmHg.  No pericardial effusion is present.  Compared to prior TVR is new.    2/7/2023 Device Interrogation   Device: [employer] [id number] [place] XT DR MRI  Normal Device Function.  Mode: VVIR  bpm  : 8.6%  Presenting EGM: Narrow Complex tachycardia @ 139 bpm  Short V-V intervals: 0  Lead Trends Appear Stable.  Estimated battery longevity to RRT = 11 years. Battery voltage = 3.02 V.  Atrial Arrhythmia: AFL  Anticoagulant: Apixaban  Ventricular Arrhythmia: 4 episodes lasting 1-4 sec 194-245 bpm  Transmission discussed with Maria Esther Cutler NP. Orders to increase diltiazem from 180 mg daily to 240 mg daily.    12/5/2022 ECHO  Interpretation Summary  Severe tricuspid insufficiency is present.  Moderate right ventricular dilation is present. Global right ventricular  function is mildly reduced.  Global and regional left ventricular function is normal with an EF of 55-60%.  IVC diameter >2.1 cm collapsing <50% with sniff suggests a high RA pressure  estimated at 15 mmHg or greater.  No pericardial effusion is present.  No significant changes noted.    11/28/2022 RHC with coronary angiogram   Right sided filling pressures are severely elevated.  Mild elevated pulmonary hypertension.  Left sided filling pressures are moderately elevated.  Reduced cardiac output level.  Hemodynamic  data has been modified in [facility] per physician review.  Prox LAD lesion is 30% stenosed.     Mild non-obstructive coronary artery disease        Assessment and Plan:   Ms. Michel is a 63 year old female with medical history pertinent for HFpEF, mildly dilated and reduced RV function, severe TR (2/2 failure of leaflet coaptation) s/p TVR 4/10/23, atrial flutter/fibrillation (on apixaban and rate control strategy), cardiac arrest (2017 likely 2/2 malignant involvement of the pericardium c/b organizing pericardial effusion), SSS s/p ppm (2019), VSD (s/p repair 1969), and DLBCL (s/p CHOP therapy and in remission). She was recently admitted 11/19/-12/5/22 with ADHF and frequent NSVT, now s/p VT ablation. She presents to [facility] [place] for 6 month follow up.     Overall, appearing well. Euvolemic by exam. Review of today's labs demonstrate stable renal function and normal lytes. Functional class I-II symptoms. Discussed weight gain. Knows that she needs to modify diet and gradually increase activity. No medication changes. Refills provided for cardiac medications.     # Acute on chronic diastolic heart failure with dilated and mildly reduced RV function (LVEF 55-60%)  Pre-Ablation: 11/28/22 RHC: CVP 24, PA 48/27/34, PCWP 23, SVO2 46, CI 1.1, CO 1.6  Post Ablation and Diuresis: 12/3/22 RHC: CVP 16, PA 41/22/28, PCWP 15, SVO2 69, CI 2.1, CO 3.0    NYHA Class II, AHA/ACC Stage C  Rate control: 97, continue diltiazem   volume status: euvolemic, continue torsemide 20 mg daily    Blood pressure control:  Diltiazem 240 mg daily   Aldosterone antagonist: Aldactone 12.5 mg daily   SGLT2:  Empagliflozin 10 mg daily   Evaluation of coronary arteries: 11/28/22 angiogram mild non-obstructive CAD     # Frequent, non-sustained ventricular tachycardia s/p VT Ablation (by Dr. Eaton on 12/1/22)  - Anti-arrhythmic: none, s/p VT ablation  - Stopped PTA digoxin per EP recommendations  - Anticoagulation: given history of Afib, continue Apixaban 5 mg  bid  - Follow up with EP specialists (Dr. Eaton and/or Maria Esther Cutler NP)    # HTN Intolerant to Metoprolol in the past.   - Diltiazem 240 mg daily, spironolactone 12.5 mg daily       # Aflutter. History of SSS s/p PPM 12/19. Long standing history of atrial arrythmias.  - Diltiazem 240 mg daily  - Continue Eliquis.   - Follow up with EP annually.       # VSD s/p repair.     # History of exudative pericardial effusion.   - Stable per CT 3/19.     # Severe TR per TTE 11/2022 s/p TVR on 4/10/23  - continue ASA 81 mg daily         Follow up:  - CORE 6 months      Elmira Vasquez DNP, NP-C  Advance Heart Failure  10/2/2024

## 2024-10-01 NOTE — PROGRESS NOTES
Infusion Nursing Note:  Amelia Michel presents today for 2nd Reclast infusion.    Patient seen by provider today: No   present during visit today: Not Applicable.    Note: Amelia comes in today for her Reclast infusion. Amelia had a lot of bone pain with her last Reclast infusion. The doctor wants to try infusing it over 2 hours to see if that helps. PIV placed with great blood return throughout in her right forearm. Reclast was hung per order over 120 minutes and then I flushed with 50 ml's of normal saline. Amelia tolerated the Reclast with no sign or symptom of a reaction.  Patient is on a 2000 cc fluid restriction and will drink all her fluid so we did not want to give to much normal saline. Patient is aware to drink her max amount of fluid today and over the next couple of days. PIV was removed and covered with gauze and coban and I did hold pressure because Amelia does bleed easily, AVS was printed and reviewed. Amelia left ambulatory to the [facility]..      Intravenous Access:  Peripheral IV placed.    Treatment Conditions:  Results reviewed, labs MET treatment parameters, ok to proceed with treatment.      Post Infusion Assessment:  Patient tolerated infusion without incident.  Blood return noted pre and post infusion.  Site patent and intact, free from redness, edema or discomfort.  No evidence of extravasations.  Access discontinued per protocol.       Discharge Plan:   Discharge instructions reviewed with: Patient.  Copy of AVS reviewed with patient and/or family.    Patient discharged in stable condition accompanied by: self.  Departure Mode: Ambulatory.      STERLING ARGUELLO RN

## 2024-10-02 ENCOUNTER — LAB (OUTPATIENT)
Dept: LAB | Facility: CLINIC | Age: 64
End: 2024-10-02
Attending: NURSE PRACTITIONER
Payer: COMMERCIAL

## 2024-10-02 ENCOUNTER — OFFICE VISIT (OUTPATIENT)
Dept: CARDIOLOGY | Facility: CLINIC | Age: 64
End: 2024-10-02
Attending: NURSE PRACTITIONER
Payer: COMMERCIAL

## 2024-10-02 VITALS
SYSTOLIC BLOOD PRESSURE: 124 MMHG | DIASTOLIC BLOOD PRESSURE: 81 MMHG | HEART RATE: 97 BPM | BODY MASS INDEX: 25.24 KG/M2 | OXYGEN SATURATION: 97 % | WEIGHT: 121.9 LBS

## 2024-10-02 DIAGNOSIS — I50.30 HEART FAILURE WITH PRESERVED EJECTION FRACTION, NYHA CLASS I (H): ICD-10-CM

## 2024-10-02 DIAGNOSIS — I50.33 ACUTE ON CHRONIC DIASTOLIC CONGESTIVE HEART FAILURE (H): ICD-10-CM

## 2024-10-02 DIAGNOSIS — I50.22 CHRONIC SYSTOLIC HEART FAILURE (H): ICD-10-CM

## 2024-10-02 DIAGNOSIS — I47.19 ATRIAL TACHYCARDIA (H): ICD-10-CM

## 2024-10-02 LAB
ANION GAP SERPL CALCULATED.3IONS-SCNC: 12 MMOL/L (ref 7–15)
BUN SERPL-MCNC: 31 MG/DL (ref 8–23)
CALCIUM SERPL-MCNC: 9.7 MG/DL (ref 8.8–10.4)
CHLORIDE SERPL-SCNC: 99 MMOL/L (ref 98–107)
CREAT SERPL-MCNC: 1.16 MG/DL (ref 0.51–1.17)
EGFRCR SERPLBLD CKD-EPI 2021: 53 ML/MIN/1.73M2
GLUCOSE SERPL-MCNC: 102 MG/DL (ref 70–99)
HCO3 SERPL-SCNC: 25 MMOL/L (ref 22–29)
POTASSIUM SERPL-SCNC: 4.1 MMOL/L (ref 3.4–5.3)
SODIUM SERPL-SCNC: 136 MMOL/L (ref 135–145)

## 2024-10-02 PROCEDURE — 80048 BASIC METABOLIC PNL TOTAL CA: CPT | Performed by: PATHOLOGY

## 2024-10-02 PROCEDURE — 36415 COLL VENOUS BLD VENIPUNCTURE: CPT | Performed by: PATHOLOGY

## 2024-10-02 PROCEDURE — 99213 OFFICE O/P EST LOW 20 MIN: CPT | Performed by: NURSE PRACTITIONER

## 2024-10-02 PROCEDURE — 99214 OFFICE O/P EST MOD 30 MIN: CPT | Performed by: NURSE PRACTITIONER

## 2024-10-02 RX ORDER — DILTIAZEM HYDROCHLORIDE 240 MG/1
240 CAPSULE, EXTENDED RELEASE ORAL DAILY
Qty: 90 CAPSULE | Refills: 3 | Status: SHIPPED | OUTPATIENT
Start: 2024-10-02

## 2024-10-02 RX ORDER — SPIRONOLACTONE 25 MG/1
12.5 TABLET ORAL DAILY
Qty: 90 TABLET | Refills: 1 | Status: SHIPPED | OUTPATIENT
Start: 2024-10-02

## 2024-10-02 RX ORDER — TORSEMIDE 20 MG/1
20 TABLET ORAL DAILY
Qty: 90 TABLET | Refills: 3 | Status: SHIPPED | OUTPATIENT
Start: 2024-10-02

## 2024-10-02 RX ORDER — ZOLEDRONIC ACID 0.05 MG/ML
5 INJECTION, SOLUTION INTRAVENOUS
COMMUNITY

## 2024-10-02 ASSESSMENT — PAIN SCALES - GENERAL: PAINLEVEL: NO PAIN (0)

## 2024-10-02 NOTE — NURSING NOTE
Chief Complaint   Patient presents with    Follow Up     10/2/24: reason for visit: Return CORE ; 62 year old with chronic diastolic heart failure presents for follow up with labs prior.       Vitals were taken and medications reconciled.    Wiley Pepper, SHAWN  11:47 AM

## 2024-10-02 NOTE — PATIENT INSTRUCTIONS
CORE: Cardiomyopathy, Optimization, Rehabilitation, Education      Take your medicines every day, as directed    Changes/Recommendations made today:  No medication changes. Refills sent to your pharmacy for your cardiac medications  Try to stay active, aim for 15-30 minutes of aerobic activity at least 5 days/week    Monitor Your Weight and Symptoms    Contact us if you:    Gain 2 pounds in one day or 5 pounds in one week  Feel more short of breath  Notice more leg swelling  Feel lightheadeded   Change your lifestyle    Limit Salt or Sodium:  2000 mg  Limit Fluids:  2000 mL or approximately 64 ounces  Eat a Heart Healthy Diet  Low in saturated fats  Stay Active:  Aim to move at least 150 minutes every  week         To Contact us    During Business Hours:  [phone], option # 1      After hours, weekends or holidays:   [phone], Option #4  Ask to speak to the On-Call Cardiologist. Inform them you are a CORE/heart failure patient at the [facility].     Use [employer] allows you to communicate directly with your heart team through secure messaging.  [employer] can be accessed any time on your phone, computer, or tablet.  If you need assistance, we'd be happy to help!         Keep your Heart Appointments:    CORE in 6 months

## 2024-10-02 NOTE — LETTER
10/2/2024      RE: Amelia Michel  [address]  [facility] [address]       Dear Colleague,    Thank you for the opportunity to participate in the care of your patient, Amelia Michel, at the [facility] [place] at [facility]. Please see a copy of my visit note below.      [facility] Cardiology   CORE Clinic      HPI:   Ms. Michel is a 63 year old female with medical history pertinent for HFpEF, mildly dilated and reduced RV function, severe TR (2/2 failure of leaflet coaptation) s/p TVR 4/10/23, atrial flutter/fibrillation (on apixaban and rate control strategy), cardiac arrest (2017 likely 2/2 malignant involvement of the pericardium c/b organizing pericardial effusion), SSS s/p ppm (2019), VSD (s/p repair 1969), and DLBCL (s/p CHOP therapy and in remission). She was recently admitted 11/19/-12/5/22 with ADHF and frequent NSVT, now s/p VT ablation. She presents to [facility] for 6 month follow up.     With regards to her cardiac conditions, as noted, Ms. Michel presented to [facility] on 11/19 with MONTALVO, BLE edema, and 8 pound weight gain over the course of one week. Chest X-ray was suspicious for pulmonary edema, NT-P BNP 1,913, Troponin T 21-->12. EDW per chart review 116-120; admission weight 128. She was admitted w/ decompensated diastolic heart failure with pictutre c/b frequent non sustained ventricular tachycardia with RHC on 11/28/22 notable for cardiogenic shock with a cardiac index of 1.2 and CO of 1.6. Etiology thought to most likely secondary to frequent non sustained ventricular tachycardia in the setting of possible restrictive/constrictive physiology given her malignancy history (albeit presumably in remission). CAD ruled on by coronary angiogram completed on 11/28/22. She underwent VT ablation on 12/1/22 and had successful resumption of her home medications with increased diuretic dosing and adddition of an SGLT2 inhibitor. She was diuresed  19 lbs to a weight of 109 lbs at time of discharge.     At CORE visit on 1/9/23 Ms. Michel was hypervolemic as  evident by weight gain, tachycardia, and peripheral edema. Review of labs demonstrated persistent hyponatremia with Na 127 and bump in Cr to 1.35. Lasix was increase 60 mg BID. Still awaiting for PA for cardiomems device. Admitted 1/24-1/29 for CHF exacerbation. Diuresed and switched to torsemide 40 mg BID. Discharged at a weight of 107 lb.     Device interrogation from 2/7 with rates 120s-140, AFL. Diltiazem increased to 240 mg daily. Since our last CORE visit in 2/2023 she has resumed torsemide 20 mg BID and spironolactone 12.5 mg daily. She was evaluated by Dr. Cope for severe TR. Admitted 4/10-4/17/23 for elective TVR with Dr. Cope.     CORE visit in 5/2023, Ms. Michel was feeling well. Weights are very stable at 103-104 lbs. Echo at that time showed preserved LVEF and mildly improved RV function. Diltiazem was increased to 180 mg daily for elevated heart rates. Started on spironolactone. CORE visit 9/2023 feeling well. Weight stable at 108 lb. No medication changes.   Underwent hip surgery in January 2024.     Today, Ms. Michel reports feeling well. No cardiac concerns. She is without peripheral edema, worsening dyspnea on exertion, orthopnea, or PND.  Denies chest pain, SOB, palpitations, lightheadedness, syncope, presyncope. Remains on eliquis for CVA prophylaxis and ASA 81 mg with new valve. No s/s of bleeding complications. She has steadily gained weight over the summer, ~ 2-3 lbs/month. She is now wearing a right ankle brace and has been somewhat less active due to ortho/mobility issues. She also reports that she has been eating a lot of sourdough bread. Feeling less motivated to exercise.     Cardiac Medications:   - ASA 81 mg daily  - Eliquis 5 mg BID  - Diltiazem 240 mg daily   - Jardiance 10 mg daily   - Torsemide 20 mg daily  - KCL 60 mEq daily    - Spironolactone 12.5 mg daily      PAST  MEDICAL HISTORY:  Past Medical History:   Diagnosis Date     Antiplatelet or antithrombotic long-term use      Arrhythmia      Arthritis      Atrial fibrillation (H)      Atrial flutter (H)      Cancer (H) June 2017    DLBCL currently in remission     Cardiac arrest (H) 2017     Chronic kidney disease      Congestive heart failure (H)      Diffuse large B-cell lymphoma of extranodal site (H) 2017     Extremity restricted from procedure     Left arm     Fall 12/06/2023    Cut on head     H/O blood transfusion reaction     Hives with platelet transfusion, needs pre-medication with tylenol and benadryl     Heart murmur      History of blood clots 2017    PICC line Right arm      History of chemotherapy      History of transfusion      Hypertension      Lymphedema of both lower extremities     wears lymphedema socks     Neuropathy     Feet and hands     NSVT (nonsustained ventricular tachycardia) (H) 12/2022     Pacemaker 2019     Pericardial effusion 2017     Physical deconditioning      PONV (postoperative nausea and vomiting)      Positive PPD, 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     Prediabetes      Pulmonary hypertension (H)      RA (rheumatoid arthritis) (H)      SSS (sick sinus syndrome) (H) 2019    Pacemaker     Steroid-induced osteoporosis      Thrombocytopenia (H)      VSD (ventricular septal defect)        FAMILY HISTORY:  Family History   Problem Relation Age of Onset     Breast Cancer Mother         60s     Hypertension Mother      Alzheimer Disease Mother      Cerebrovascular Disease Mother      Hypertension Father      Cerebrovascular Disease Father      Atrial fibrillation Father      Lymphoma Father      Prostate Cancer Father      Other Cancer Father         some form of Lymphoma     Alzheimer Disease Maternal Grandmother         likely     Arthritis Maternal Grandfather      Pneumonia Maternal Grandfather      Diabetes Paternal Grandmother      Mental Illness Sister         early onset  alzheimers       SOCIAL  HISTORY:  Social History     Socioeconomic History     Marital status:      Spouse name: Romeo Michel     Number of children: 0   Occupational History     Employer: [employer]   Tobacco Use     Smoking status: Never     Smokeless tobacco: Never   Substance and Sexual Activity     Alcohol use: Not Currently     Comment: none     Drug use: No   Other Topics Concern     Parent/sibling w/ CABG, MI or angioplasty before 65F 55M? No     Social Determinants of Health     Intimate Partner Violence: Not At Risk     Fear of Current or Ex-Partner: No     Emotionally Abused: No     Physically Abused: No     Sexually Abused: No       CURRENT MEDICATIONS:  Current Outpatient Medications   Medication Sig Dispense Refill     acetaminophen (TYLENOL) 325 MG tablet Take 3 tablets (975 mg) by mouth every 8 hours       aspirin (ASA) 81 MG chewable tablet 1 tablet (81 mg) by Oral or NG Tube route daily 30 tablet 2     calcium carbonate 600 mg-vitamin D 400 units (CALTRATE) 600-400 MG-UNIT per tablet Take 2 tablets by mouth daily       dexAMETHasone (DECADRON) 4 MG/ML injection Inject 4 mg for adrenal crisis 2 mL 1     diltiazem ER (DILT-XR) 240 MG 24 hr ER beaded capsule TAKE 1 CAPSULE(240 MG) BY MOUTH DAILY 90 capsule 2     docusate sodium (COLACE) 100 MG capsule Take 100 mg by mouth 2 times daily as needed for constipation       ELIQUIS ANTICOAGULANT 5 MG tablet TAKE 1 TABLET(5 MG) BY MOUTH TWICE DAILY 180 tablet 3     empagliflozin (JARDIANCE) 10 MG TABS tablet Take 1 tablet (10 mg) by mouth daily (Patient taking differently: Take 10 mg by mouth daily. Stopping 1/20/24 before surgery) 90 tablet 3     gabapentin (NEURONTIN) 100 MG capsule Take 100 mg by mouth 2 times daily AM and Afternoon       gabapentin (NEURONTIN) 100 MG capsule Take 200 mg by mouth at bedtime       hydroxychloroquine (PLAQUENIL) 200 MG tablet Take 1 tablet (200 mg) by mouth daily 90 tablet 1     loratadine (CLARITIN) 10 MG tablet  Take 10 mg by mouth daily as needed       magnesium oxide 200 MG TABS Take 200 mg by mouth daily bedtime       potassium chloride timmy ER (KLOR-CON M20) 20 MEQ CR tablet Take 3 tablets (60 mEq) by mouth daily 270 tablet 3     predniSONE (DELTASONE) 1 MG tablet Take 3 tablets (3 mg) by mouth daily - Oral 270 tablet 1     predniSONE (DELTASONE) 10 MG tablet Take 1 tablet (10 mg) by mouth daily. 20 tablet 1     spironolactone (ALDACTONE) 25 MG tablet Take 0.5 tablets (12.5 mg) by mouth daily 90 tablet 1     sulfamethoxazole-trimethoprim (BACTRIM DS) 800-160 MG tablet Take 1 tablet by mouth 2 times daily 4 tablet 5     Tocilizumab (ACTEMRA ACTPEN) 162 MG/0.9ML SOAJ Inject 162 mg Subcutaneous every 14 days 1.8 mL 3     torsemide (DEMADEX) 20 MG tablet Take 1 tablet (20 mg) by mouth daily 90 tablet 3     Vitamin D (Cholecalciferol) 10 MCG (400 UNIT) TABS Take 1 tablet by mouth daily       No current facility-administered medications for this visit.     Facility-Administered Medications Ordered in Other Visits   Medication Dose Route Frequency Provider Last Rate Last Admin     albuterol (PROVENTIL HFA/VENTOLIN HFA) inhaler  1-2 puff Inhalation Once PRN Dima Shrestha MD         albuterol (PROVENTIL) neb solution 2.5 mg  2.5 mg Nebulization Once PRN Dima Shrestha MD         diphenhydrAMINE (BENADRYL) injection 50 mg  50 mg Intravenous Once PRN Dima Shrestha MD         EPINEPHrine (Anaphylaxis) (ADRENALIN) injection (vial) 0.3 mg  0.3 mg Intramuscular Q5 Min PRN Dima Shrestha MD         famotidine (PEPCID) injection 20 mg  20 mg Intravenous Once PRN Dima Shrestha MD         methylPREDNISolone Na Suc (solu-MEDROL) injection 125 mg  125 mg Intravenous Once PRN Dima Shrestha MD         sodium chloride (PF) 0.9% PF flush 3-20 mL  3-20 mL Intracatheter q1 min prn Dima Shrestha MD         sodium chloride 0.9% BOLUS 250 mL  250 mL Intravenous Once Dima Shrestha MD          sodium chloride 0.9% BOLUS 500 mL  500 mL Intravenous Once PRN Dima Shrestha MD         zoledronic acid (RECLAST) infusion 5 mg  5 mg Intravenous Once Dima Shrestha MD 50 mL/hr at 10/01/24 1206 5 mg at 10/01/24 1206       ROS:   Refer to HPI    EXAM:  /81 (BP Location: Right arm, Patient Position: Chair, Cuff Size: Adult Regular)   Pulse 97   Wt 55.3 kg (121 lb 14.4 oz)   SpO2 97%   BMI 25.24 kg/m     GENERAL: Appears comfortable, in no acute distress.   HEENT: Eye symmetrical, no discharge or icterus bilaterally. Mucous membranes moist and without lesions.  CV: tachycardic, irregular, +S1S2, systolic murmur, no rub, or gallop. JVP at clavicle    RESPIRATORY: Respirations regular, even, and unlabored. Lungs CTA throughout.   GI: Soft and non distended with normoactive bowel sounds present in all quadrants. No tenderness, rebound, guarding. No hepatomegaly.   EXTREMITIES: No peripheral edema. 2+ bilateral pedal pulses.   NEUROLOGIC: Alert and oriented x 3. No focal deficits.   MUSCULOSKELETAL: No joint swelling or tenderness.   SKIN: No jaundice. No rashes or lesions.     Labs, reviewed with patient in clinic today:  CBC RESULTS:  Lab Results   Component Value Date    WBC 3.4 (L) 09/16/2024    WBC Canceled, Test credited 03/16/2021    RBC 4.48 09/16/2024    RBC Canceled, Test credited 03/16/2021    HGB 15.6 09/16/2024    HGB Canceled, Test credited 03/16/2021    HCT 44.5 09/16/2024    HCT Canceled, Test credited 03/16/2021    MCV 99 09/16/2024    MCV Canceled, Test credited 03/16/2021    MCH 34.8 (H) 09/16/2024    MCH Canceled, Test credited 03/16/2021    MCHC 35.1 09/16/2024    MCHC Canceled, Test credited 03/16/2021    RDW 12.2 09/16/2024    RDW Canceled, Test credited 03/16/2021     (L) 09/16/2024    PLT Canceled, Test credited 03/16/2021       CMP RESULTS:  Lab Results   Component Value Date     09/10/2024     (L) 11/29/2022     06/23/2021    POTASSIUM 3.5  09/10/2024    POTASSIUM 3.5 04/10/2023    POTASSIUM 3.9 07/20/2022    POTASSIUM 4.2 06/23/2021    CHLORIDE 92 (L) 09/10/2024    CHLORIDE 101 07/20/2022    CHLORIDE 102 06/23/2021    CO2 30 (H) 09/10/2024    CO2 28 07/20/2022    CO2 28 06/23/2021    ANIONGAP 14 09/10/2024    ANIONGAP 7 07/20/2022    ANIONGAP 6 06/23/2021     (H) 09/10/2024     (H) 01/24/2024     (H) 07/20/2022    GLC 98 06/23/2021    BUN 31.2 (H) 09/10/2024    BUN 31 (H) 07/20/2022    BUN 25 06/23/2021    CR 1.25 (H) 09/10/2024    CR 1.05 (H) 06/23/2021    GFRESTIMATED 48 (L) 09/10/2024    GFRESTIMATED 57 (L) 06/23/2021    GFRESTBLACK 67 06/23/2021    VIKTORIYA 10.5 (H) 09/10/2024    VIKTORIYA 10.5 (H) 09/10/2024    VIKTORIYA 10.0 06/23/2021    BILITOTAL 0.9 09/10/2024    BILITOTAL 1.2 06/23/2021    ALBUMIN 5.0 09/10/2024    ALBUMIN 4.4 04/20/2022    ALBUMIN 4.2 06/23/2021    ALKPHOS 99 09/10/2024    ALKPHOS 136 06/23/2021    ALT 27 09/10/2024    ALT 41 06/23/2021    AST 41 09/10/2024    AST 36 06/23/2021        INR RESULTS:  Lab Results   Component Value Date    INR 1.71 (H) 04/10/2023    INR 3.42 (H) 03/07/2018       Lab Results   Component Value Date    MAG 2.0 04/16/2023    MAG 1.9 04/12/2018     Lab Results   Component Value Date    NTBNPI 2,067 (H) 01/24/2023    NTBNPI 485 12/12/2019     Lab Results   Component Value Date    NTBNP 1,425 (H) 10/13/2021    NTBNP 1,274 (H) 05/26/2021       Cardiac Diagnostics:    8/5/2024 Echo  Device: Asker [id number] Claudia XT DR MRI  Normal Device Function  Mode: DDDR  bpm  AP: 79.8%  : 98.6%  Presenting EGM: AP/ @ 75 bpm  Short V-V intervals: 0  Lead Trends Appear Stable  Estimated battery longevity to RRT = 8.8 years. Battery voltage = 2.99 V.   Atrial Arrhythmia: 88 AT/AF episodes recorded.  AF [place]: 4.5%  Anticoagulant: Eliquis  Ventricular Arrhythmia: 12 NSVT episodes recorded - 1-3 seconds, 176-194 bpm.    5/24/2023 Echo   Interpretation Summary  Tricuspid valve replacement with 29mm [place] Silvio  Epic.  Doppler interrogation of the tricspid valve is normal.  Global and regional left ventricular function is normal with an EF of 55-60%.  The right ventricle is normal size. Global right ventricular function is  mildly to moderately reduced.  Severe biatrial enlargement is present.  Mild to moderate mitral insufficiency is present.  IVC diameter >2.1 cm collapsing <50% with sniff suggests a high RA pressure  estimated at 15 mmHg or greater.  No pericardial effusion is present.  Compared to prior, RV appears slightly better, CVP is higher now.    4/17/2023 Echo   Interpretation Summary  Minimally invasive tricuspid valve replacement with 29mm St Silvio Epic. Doppler  interrogation of the tricspid valve is normal. TV mean gradient 4-6 mmHg.  Moderate right ventricular dilation is present. Global right ventricular  function is mildly to moderately reduced.  Global and regional left ventricular function is normal with an EF of 60-65%.  Mild to moderate mitral insufficiency is present.  IVC diameter <2.1 cm collapsing >50% with sniff suggests a normal RA pressure  of 3 mmHg.  No pericardial effusion is present.  Compared to prior TVR is new.    2/7/2023 Device Interrogation   Device: [id number] [place]ott XT DR MRI  Normal Device Function.  Mode: VVIR  bpm  : 8.6%  Presenting EGM: Narrow Complex tachycardia @ 139 bpm  Short V-V intervals: 0  Lead Trends Appear Stable.  Estimated battery longevity to RRT = 11 years. Battery voltage = 3.02 V.  Atrial Arrhythmia: AFL  Anticoagulant: Apixaban  Ventricular Arrhythmia: 4 episodes lasting 1-4 sec 194-245 bpm  Transmission discussed with MariaE sther Cutler NP. Orders to increase diltiazem from 180 mg daily to 240 mg daily.    12/5/2022 ECHO  Interpretation Summary  Severe tricuspid insufficiency is present.  Moderate right ventricular dilation is present. Global right ventricular  function is mildly reduced.  Global and regional left ventricular function is normal with an  EF of 55-60%.  IVC diameter >2.1 cm collapsing <50% with sniff suggests a high RA pressure  estimated at 15 mmHg or greater.  No pericardial effusion is present.  No significant changes noted.    11/28/2022 RHC with coronary angiogram   Right sided filling pressures are severely elevated.  Mild elevated pulmonary hypertension.  Left sided filling pressures are moderately elevated.  Reduced cardiac output level.  Hemodynamic data has been modified in Epic per physician review.  Prox LAD lesion is 30% stenosed.     Mild non-obstructive coronary artery disease        Assessment and Plan:   Ms. Michel is a 63 year old female with medical history pertinent for HFpEF, mildly dilated and reduced RV function, severe TR (2/2 failure of leaflet coaptation) s/p TVR 4/10/23, atrial flutter/fibrillation (on apixaban and rate control strategy), cardiac arrest (2017 likely 2/2 malignant involvement of the pericardium c/b organizing pericardial effusion), SSS s/p ppm (2019), VSD (s/p repair 1969), and DLBCL (s/p CHOP therapy and in remission). She was recently admitted 11/19/-12/5/22 with ADHF and frequent NSVT, now s/p VT ablation. She presents to CORE for 6 month follow up.     Overall, appearing well. Euvolemic by exam. Review of today's labs demonstrate stable renal function and normal lytes. Functional class I-II symptoms. Discussed weight gain. Knows that she needs to modify diet and gradually increase activity. No medication changes. Refills provided for cardiac medications.     # Acute on chronic diastolic heart failure with dilated and mildly reduced RV function (LVEF 55-60%)  Pre-Ablation: 11/28/22 RHC: CVP 24, PA 48/27/34, PCWP 23, SVO2 46, CI 1.1, CO 1.6  Post Ablation and Diuresis: 12/3/22 RHC: CVP 16, PA 41/22/28, PCWP 15, SVO2 69, CI 2.1, CO 3.0    NYHA Class II, AHA/ACC Stage C  Rate control: 97, continue diltiazem   volume status: euvolemic, continue torsemide 20 mg daily    Blood pressure control:  Diltiazem 240 mg  daily   Aldosterone antagonist: Aldactone 12.5 mg daily   SGLT2:  Empagliflozin 10 mg daily   Evaluation of coronary arteries: 11/28/22 angiogram mild non-obstructive CAD     # Frequent, non-sustained ventricular tachycardia s/p VT Ablation (by Dr. Eaton on 12/1/22)  - Anti-arrhythmic: none, s/p VT ablation  - Stopped PTA digoxin per EP recommendations  - Anticoagulation: given history of Afib, continue Apixaban 5 mg bid  - Follow up with EP specialists (Dr. Eaton and/or Maria Esther Cutler NP)    # HTN Intolerant to Metoprolol in the past.   - Diltiazem 240 mg daily, spironolactone 12.5 mg daily       # Aflutter. History of SSS s/p PPM 12/19. Long standing history of atrial arrythmias.  - Diltiazem 240 mg daily  - Continue Eliquis.   - Follow up with EP annually.       # VSD s/p repair.     # History of exudative pericardial effusion.   - Stable per CT 3/19.     # Severe TR per TTE 11/2022 s/p TVR on 4/10/23  - continue ASA 81 mg daily         Follow up:  - CORE 6 months      Elmira Vasquez DNP, NP-C  Advance Heart Failure  10/2/2024      Please do not hesitate to contact me if you have any questions/concerns.     Sincerely,     ELIZABETH Mcgee CNP

## 2024-10-10 ENCOUNTER — TELEPHONE (OUTPATIENT)
Dept: CARDIOLOGY | Facility: CLINIC | Age: 64
End: 2024-10-10
Payer: COMMERCIAL

## 2024-10-10 NOTE — TELEPHONE ENCOUNTER
Patient Contacted for the patient to call back and schedule the following:    Appointment type: rtn ep   Provider: randall clancy   Return date: 01/31/25  Specialty phone number: [phone] opt 1   Additional appointment(s) needed: n/a   Additonal Notes: n/a

## 2024-10-18 DIAGNOSIS — M05.79 RHEUMATOID ARTHRITIS INVOLVING MULTIPLE SITES WITH POSITIVE RHEUMATOID FACTOR (H): ICD-10-CM

## 2024-10-22 ENCOUNTER — TELEPHONE (OUTPATIENT)
Dept: RHEUMATOLOGY | Facility: CLINIC | Age: 64
End: 2024-10-22
Payer: COMMERCIAL

## 2024-10-22 NOTE — TELEPHONE ENCOUNTER
Appointment reminder call complete. Patient confirmed date and time of appointment and stated she will come to the appointment.     Jackie Beaver CMA   10/22/2024 2:30 PM

## 2024-10-23 ENCOUNTER — LAB (OUTPATIENT)
Dept: LAB | Facility: CLINIC | Age: 64
End: 2024-10-23
Payer: COMMERCIAL

## 2024-10-23 ENCOUNTER — OFFICE VISIT (OUTPATIENT)
Dept: RHEUMATOLOGY | Facility: CLINIC | Age: 64
End: 2024-10-23
Attending: INTERNAL MEDICINE
Payer: COMMERCIAL

## 2024-10-23 VITALS
BODY MASS INDEX: 26.75 KG/M2 | HEIGHT: 57 IN | TEMPERATURE: 98.7 F | SYSTOLIC BLOOD PRESSURE: 108 MMHG | HEART RATE: 86 BPM | OXYGEN SATURATION: 96 % | WEIGHT: 124 LBS | DIASTOLIC BLOOD PRESSURE: 71 MMHG

## 2024-10-23 DIAGNOSIS — M81.8 STEROID-INDUCED OSTEOPOROSIS: ICD-10-CM

## 2024-10-23 DIAGNOSIS — M05.79 RHEUMATOID ARTHRITIS INVOLVING MULTIPLE SITES WITH POSITIVE RHEUMATOID FACTOR (H): ICD-10-CM

## 2024-10-23 DIAGNOSIS — T38.0X5A STEROID-INDUCED OSTEOPOROSIS: ICD-10-CM

## 2024-10-23 LAB
ALBUMIN SERPL BCG-MCNC: 4.7 G/DL (ref 3.5–5.2)
ALT SERPL W P-5'-P-CCNC: 24 U/L (ref 0–70)
AST SERPL W P-5'-P-CCNC: 38 U/L (ref 0–45)
BASOPHILS # BLD AUTO: 0 10E3/UL (ref 0–0.2)
BASOPHILS NFR BLD AUTO: 1 %
CREAT SERPL-MCNC: 1.16 MG/DL (ref 0.51–1.17)
CRP SERPL-MCNC: 3.74 MG/L
EGFRCR SERPLBLD CKD-EPI 2021: 53 ML/MIN/1.73M2
EOSINOPHIL # BLD AUTO: 0.1 10E3/UL (ref 0–0.7)
EOSINOPHIL NFR BLD AUTO: 2 %
ERYTHROCYTE [DISTWIDTH] IN BLOOD BY AUTOMATED COUNT: 12.7 % (ref 10–15)
ERYTHROCYTE [SEDIMENTATION RATE] IN BLOOD BY WESTERGREN METHOD: 16 MM/HR (ref 0–30)
HCT VFR BLD AUTO: 43.3 % (ref 35–53)
HGB BLD-MCNC: 15.3 G/DL (ref 11.7–17.7)
IMM GRANULOCYTES # BLD: 0 10E3/UL
IMM GRANULOCYTES NFR BLD: 0 %
LYMPHOCYTES # BLD AUTO: 0.5 10E3/UL (ref 0.8–5.3)
LYMPHOCYTES NFR BLD AUTO: 13 %
MCH RBC QN AUTO: 35.3 PG (ref 26.5–33)
MCHC RBC AUTO-ENTMCNC: 35.3 G/DL (ref 31.5–36.5)
MCV RBC AUTO: 100 FL (ref 78–100)
MONOCYTES # BLD AUTO: 0.6 10E3/UL (ref 0–1.3)
MONOCYTES NFR BLD AUTO: 16 %
NEUTROPHILS # BLD AUTO: 2.6 10E3/UL (ref 1.6–8.3)
NEUTROPHILS NFR BLD AUTO: 68 %
NRBC # BLD AUTO: 0 10E3/UL
NRBC BLD AUTO-RTO: 0 /100
PLATELET # BLD AUTO: 103 10E3/UL (ref 150–450)
RBC # BLD AUTO: 4.33 10E6/UL (ref 3.8–5.9)
WBC # BLD AUTO: 3.9 10E3/UL (ref 4–11)

## 2024-10-23 PROCEDURE — 84460 ALANINE AMINO (ALT) (SGPT): CPT | Performed by: PATHOLOGY

## 2024-10-23 PROCEDURE — G2211 COMPLEX E/M VISIT ADD ON: HCPCS | Performed by: INTERNAL MEDICINE

## 2024-10-23 PROCEDURE — 36415 COLL VENOUS BLD VENIPUNCTURE: CPT | Performed by: PATHOLOGY

## 2024-10-23 PROCEDURE — 84450 TRANSFERASE (AST) (SGOT): CPT | Performed by: PATHOLOGY

## 2024-10-23 PROCEDURE — 99215 OFFICE O/P EST HI 40 MIN: CPT | Mod: GC | Performed by: INTERNAL MEDICINE

## 2024-10-23 PROCEDURE — 86140 C-REACTIVE PROTEIN: CPT | Performed by: PATHOLOGY

## 2024-10-23 PROCEDURE — 82040 ASSAY OF SERUM ALBUMIN: CPT | Performed by: PATHOLOGY

## 2024-10-23 PROCEDURE — 85652 RBC SED RATE AUTOMATED: CPT | Performed by: PATHOLOGY

## 2024-10-23 PROCEDURE — 85025 COMPLETE CBC W/AUTO DIFF WBC: CPT | Performed by: PATHOLOGY

## 2024-10-23 PROCEDURE — 82565 ASSAY OF CREATININE: CPT | Performed by: PATHOLOGY

## 2024-10-23 PROCEDURE — 99213 OFFICE O/P EST LOW 20 MIN: CPT | Performed by: INTERNAL MEDICINE

## 2024-10-23 RX ORDER — PREDNISONE 1 MG/1
TABLET ORAL
Qty: 270 TABLET | Refills: 3 | Status: SHIPPED | OUTPATIENT
Start: 2024-10-23

## 2024-10-23 RX ORDER — HYDROXYCHLOROQUINE SULFATE 200 MG/1
200 TABLET, FILM COATED ORAL DAILY
Qty: 90 TABLET | Refills: 1 | Status: SHIPPED | OUTPATIENT
Start: 2024-10-23

## 2024-10-23 ASSESSMENT — PAIN SCALES - GENERAL: PAINLEVEL_OUTOF10: MODERATE PAIN (4)

## 2024-10-23 NOTE — PATIENT INSTRUCTIONS
Labs today    Switch actemra to orencia weekly injection     Return in about 4-5 months in person   0

## 2024-10-23 NOTE — LETTER
10/23/2024       RE: Amelia Michel  [address]  [facility] [id number]     Dear Colleague,    Thank you for referring your patient, Amelia Michel, to the [facility] RHEUMATOLOGY [facility] [place] at [facility]. Please see a copy of my visit note below.    Last seen: 9/21/2023    DOS: 10/23/2024    Reason for in person follow up visit: RA, high risk med use    [facility] - Rheumatology Clinic Visit    Amelia Michel is a 61 year old here for rheumatoid arthritis (RA) dx 35 y/o. +CCP >331 -RF -KALYAN panel. 5-2108 XR 5-2018 DDD and facet osteoarthritis, congential fusion C2-3) prednisone since dx at 35 y/o, hydroxychloroquine (plaquenil) long-time since dx tapered by 200 mg 9-2019. Lymphoma in liver, heart, bone and mass between esophagus, left pelvic, right ankle.     Review-Dr. Dumas in [facility] for many years. She had relatively inactive disease prior to her arrest but was on methotrexate 10 mg PO per week, Arava 10 mg daily, Plaquenil 200 mg twice per day, and prednisone 3 mg daily. She has had partial fusion of her right wrist. After discharge from the hospital she was put on prednisone 5 mg daily as monotherapy. Sulfasalazine -not effective long-time, initial alan but resolved after retried. Past Dr. Cunha and Dr. 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 Treated concervatively with plaquenil and 5mg prednisone, and not interested in rituximab or other biologics. She has previously been treated with sulfasalazine and initially developed a rash which was initially thought to be a sulfa rash, but her rash resolved and did not reoccur after being placed back on sulfasalazine. She was also treated with Arava, but this discontinued during her cancer treatment. She feels that these drugs have had no effect on her  "symptoms. Etanercept (enbrel) in past no benefits.     March 10, 2021  Denies any fever, chills, SOB, MONTALVO, night sweats, or chest pain, high fever, cough, travel in last 14 days or exposure to covid-19 (coronavirus). Reports healthy. Follows CDC guidelines. Sinus issues for a week other then that fine. Not out tho either. Will be getting vaccine signing in now.     Arthritis is hand bothering her. Left worse, left hand neuropathy from antecubital IV reaction \"take out muscle and fat\". Its her thumb gets her issues. Hurts all the time.  Not swelling. Base of thumb some tingling. The same. With her history cardiac arrest and chemo does have splint, does not need OT. Walking now and mentally doing good.      Right ankle triple arthrodesis Oct 2020, learning how to walk properly. \"much better\".  Doing more more active.     Fosamax 70 mg every 7 days restarted 9-2020 per Dr. Maribell Domínguez, was on with PCP  In past \"not concerned on for too long\" will need future time off of this was off for 18 month. Prednisone 5 mg once day chronically, hydroxychloroquine (plaquenil) 200 mg once day -eye exam in Dec 2020 early catarct          9/9/2021: Ms. Michel is doing fairly well with no major complaints or RA flare ups on  mg qd+predniosne 5 mg every day. Eye exam is updated. Fully vaccinated. PCP told her to hold fosamax till further discussion in Oct.      Has pain over palms. This gets worse throughout the day. No AM stiffness. Does more chores around the house which is a trigger.     1st covid vaccine caused body ache, second dose caused fatigue.    3/9/2022:    No joint swelling, has edema due HF, joints hurt, warm shower helps, lost strength, has stiffness all day.    Eye exam is due 1/2023    Off pred since 3/3 .    9/16/2022:    Amelia is here for follow up. Has pain over R foot, is back on PT,     Has problem with fine motor, it's hard to hold a plate.    Has SOB on and off due to Afib, CHF. BP is up. Seeing " cardiology.    No am stiffness.    No oral ulcers.    No rash.    Back on pred 3 mg every day.    Back on fosamax.      09/21/2023  Overall reports feeling more pain .   - She has a new sharp left buttock pain radiating to the left thigh and stopping at the knee x3 months. No specific exacerbating or relieving factors.   - She reports progressively worsening right ankle pain. In 2020 this joint was surgically fused and since then has progressively inverted and affecting her gait. She is now having increasing right ankle, knee, and hip pain.   - Chronic left knee pain worsening over time. She has had 3 joints injections in the knee but without significant improvement.    - States hands have been more crampy. She denies redness/heat/swelling. States the chronic deformities and cramping affects her dexterity and worsens her ability to complete some activities like cooking    Last saw oncology 9/14 with good report    Current medications include Plaquenil and Prednisone 3mg. Tylenol occasionally but no relief. Last eye exam 01/2023, next already scheduled for 01/2024.     At this time interested in exploring other medications due to increased pain. Questions if pain management may be beneficial as well.   - Currently on Fosamax, start Reclast on Monday    ROS   + dry itchy eyes and itchy ears (improves with claritin)   - No fevers, weight loss, night sweats, chest pain, dyspnea, cough, N/V/D, eye pain or vision changes, nail changes, mouth sores, raynauds,       6/28/2024:  -had L hip arthroplasty on 1/24/2024 after fracture, recovered  -has neuropathy, hard to do things with neuropathy  -pain mx did not help with joint pain  -hands are stiff more at night, wakes her up  -MCPs are hurting at palmar side, more on L side  -had R ankle arthrodesis, wears a brace pain on and off. Does exercise and does massage tx for tony horse pain  -no joint swelling  -her plaquenil dose is 1 tab of 200 mg a day    TODAY 10/23/24:  -  PMH seropositive RA, OA s/p L hip arthroplasty 1/2024, hx lymphoma   - Current regimen: Plaquenil 200 mg daily, Prednisone 3 mg daily, Acemra 162 mg [id number], Reclast   - Last evaluated 6/2024. Started Actemra 7/2024 Monday's. Labs 9/2024 with WBC 3.4,   - Pain: Ongoing. Mainly to bilateral palms, burning pain, right hand occasionally swelling, both get stiff, no redness or warmth. Also has pain to gluteal on both sides- no trauma, newer, no redness/swelling/warmth, has tried massage with little relief.   - Sometimes has trigger finger in fourth finger. Has known neuropathy. Saw Neurology without much improvement, recommended nerve biopsy. Gabapentin 400 mg daily currently.   - Has not noticed any difference since starting Actemra. OK to give more time, but if not helping would  - Has eye exam scheduled 1/2025  - No recent infections        ROS: A comprehensive ROS was done. Positives are per HPI.      PMH:  Injury-left thumb amputation, left finger broke  Medical-  Antiplatelet or antithrombotic long-term use  Arrhythmia  Atrial tachycardia, flutter  Paroxysmal atrial fibrillation  Arthritis  Lymphoma  Cardiac arrest  history of chemotherapy  Primary pulmonary hypertension  Neuropathy  Postoperative nausea and vomiting  Rheumatoid arthritis  Hyperlipidemia LDL goal <130  Lymphedema of both lower extremities  Cardiogenic shock and cardiac arrest 5/2017  Acute respiratory failure with hypoxia  Critical illness myopathy  Sepsis  Wound of left upper extremity  Diffuse large B-cell lymphoma of extranodal site  Febrile neutropenia  Physical deconditioning  Palpitations  Osteoporosis  -DEXA 2018   Slowly healing wound in medial left antecubital fossa after traumatic IV  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, latter felt from lymphoma   Neuropathy of right foot with weakness after intrathecal cytarabine  perioditis   Right arm PICC blood clots during cancer treatments  Right shoulder  "effusion when had pericarditis       Past Surgical History:  Anesthesia cardioversion  Right wrist arthrodesis, partial fusion \"history migrated out\"  Bone marrow aspirate & biopsy  Excise lesion upper extremity - left wound excision and closure, possible submuscular transposition of ulnar nerve  Foot surgery - 4 left, 2 right  Carpal tunnel bilateral release  Repair ventricular septal defect  Transpositional ulnar nerve (elbow) left   Right shoulder effusion history     Family History:   No autoimmune disorders, psoriasis, UC, crohn's, SLE, RA, PsA, gout, autoimmune thyroid.  No MS, heart disease in family  Mother - breast cancer, hypertension, alzheimer disease-passed   Father - hypertension, lymphoma, circulatory A fib-passed  Paternal grandmother - diabetes  Siblings-younger sister heatlhy PB, younger brother think arthritis not dx PB     Social History:   No smoking or tobacco use. No alcohol use. No IVDU. None Children. Right handed. . Disability          ROS:    A comprehensive ROS was done. Positives are per HPI.      Ph. E:    /71   Pulse 86   Temp 98.7  F (37.1  C)   Ht 1.448 m (4' 9\")   Wt 56.2 kg (124 lb)   SpO2 96%   BMI 26.83 kg/m      Constitutional: NAD, pleasant, ambulates with a cane  Eyes: Normal EOM, conjunctiva, sclera  ENT: no oral ulcers  Neck: No mass or thyroid enlargement  Chest: old sternotomy scar, pacemaker implanted on left chest wall, good perfusion. CTAB  CV: no M/R/G, irregular HR (Afib)  Abdomen: soft, NT  Lymph: No cervical, supraclavicular nodes  MS: Ulnar deviation of hands. Evidence of mild active synovitis left dorsal wrist. Chronic RA and surgical deformities. R ankle in brace.  Skin: No rash  Neuro: grossly non focal  Psych: Normal affect.     Labs/Imaging:  Eye Exam (10/23/18)  Significant for mild H-lated nuclear cataracts.   Ocular CT was recommended for right eye.     CT Chest/Abdomen/Pelvis (9/26/18)  In this patient with a history of lymphoma:  1. " No evidence of disease recurrence, consistent with complete  response per Lugano criteria.  2. Stable cardiomegaly. Improved pericardial effusion.  3. Early contrast phase with heterogeneous visceral enhancement in the  abdomen, likely secondary to cardiac dysfunction.    MRI Cardiac w/contrast (4/12/18)  Loculated exudative pericardial effusion that is beginning to organize, with diffuse  pericardial enhancement consistent with ongoing inflammation. Normal LV fxn, LVEF 54% and RVEF 49%. At  least moderate, possibly severe tricuspid regurgitation. Compared to MRI from 03/2018, effusion is largely  unchanged in size (maybe a bit smaller) but is starting to organize. No change in pericardial enhancement.    Laboratory:   Component      Latest Ref Rng & Units 3/25/2021 5/26/2021 6/23/2021 7/21/2021   WBC      4.0 - 11.0 thou/uL 5.6      RBC Count      3.80 - 5.40 mill/uL 4.31      Hemoglobin      12.0 - 16.0 g/dL 15.4      Hematocrit      35.0 - 47.0 % 44.6      MCV      80 - 100 fL 104 (H)      MCH      27.0 - 34.0 pg 35.7 (H)      MCHC      32.0 - 36.0 g/dL 34.5      RDW      11.0 - 14.5 % 14.4      Platelet Count      140 - 440 thou/uL 126 (L)      Mean Platelet Volume      7.0 - 10.0 fL 9.5      % Neutrophils      50 - 70 % 80 (H)      % Lymphocytes      20 - 40 % 10 (L)      % Monocytes      2 - 10 % 9      % Eosinophils      0 - 6 % 1      % Basophils      0 - 2 % 0      % Immature Granulocytes      <=0 % 0      Absolute Neutrophils      2.0 - 7.7 thou/uL 4.5      Absolute Lymphocytes      0.8 - 4.4 thou/uL 0.6 (L)      Absolute Monocytes      0.0 - 0.9 thou/uL 0.5      Eosinophils Absolute      0.0 - 0.4 thou/uL 0.0      Absolute Basophils      0.0 - 0.2 thou/uL 0.0      Absolute Immature Granulocytes      <=0.0 thou/uL 0.0      Sodium      133 - 144 mmol/L  136 136 137   Potassium      3.4 - 5.3 mmol/L  3.6 4.2 3.9   Chloride      94 - 109 mmol/L  101 102 103   Carbon Dioxide      20 - 32 mmol/L  24 28 28    Anion Gap      3 - 14 mmol/L  11 6 6   Glucose      70 - 99 mg/dL  100 (H) 98 110 (H)   Urea Nitrogen      7 - 30 mg/dL  21 25 29   Creatinine      0.52 - 1.25 mg/dL  1.00 1.05 (H) 1.02   GFR Estimate      >60 mL/min/1.73m2  61 57 (L) 60 (L)   GFR Estimate If Black      >60 mL/min/1.73:m2  71 67    Calcium      8.5 - 10.1 mg/dL  9.3 10.0 9.6   Bilirubin Direct      0.0 - 0.2 mg/dL   0.5 (H)    Bilirubin Total      0.2 - 1.3 mg/dL   1.2    Albumin      3.4 - 5.0 g/dL   4.2    Protein Total      6.8 - 8.8 g/dL   7.5    Alkaline Phosphatase      40 - 150 U/L   136    ALT      0 - 50 U/L   41    AST      0 - 45 U/L   36    N-Terminal Pro Bnp      0 - 125 pg/mL  1,274 (H)     Hemoglobin A1C      0 - 5.6 %  6.2 (H)     Immunoglobulin G      700-1,700 mg/dL 1,179          Impression/Plan:  1. Seropositive rheumatoid arthritis (, RF 31) with multiple joint disability including MTP fusion 1-5 bilaterally, partial right wrist fusion, subluxation and ulnar deformity of MCP's. Rheumatoid arthritis (RA) controlled. Continue prednisone at 3 mg every day (more pain, stiffness off prednisone when she was off), and hydroxychloroquine (plaquenil) 200 mg once a day. Rheumatoid arthritis does not seem to be flaring today, however her joint deformities are beginning to affect her quality of life due to reduced dexterity. Given her history of Lymphoma would stay away from TNF inhibitors. Will consider Actemra for better control. Will order screening labs today and refer to pain management in the interim.     2. Long-term hydroxychloroquine (plaquenil) use since 6-2017, no toxicity so far, reviewed AAO guidelines, repeat every year.    3. Diffuse large B-cell lymphoma status-post 1 cycle R-EPOCH, 6 cycles R-CHOP; Neuropathy of lower extremities attributed to lymphoma improved on gabapentin 200 mg TID-tolerating. Loculated pericardial effusion, etiology unclear (lymphoma). No symptoms now. Last saw Oncology 09/2023 with good  report.    4. Osteoporosis, chronic long-term corticosteroid use with cushingoid appearance. Received 6-7 years of alendronate in early 2000s. Restarted alendronate in 1/2019, off 3-2020 to 9-2020 then restarted 9-2020. DEXA  T-2.6 thoracic. If > 5 yr higher risk atypical Fx. Off fosamax at last visit given improvement of bone density in 3/2021 but on chronic steroid tx, did refer to endocrinology to manage steroid induced osteoporosis and endocrinology resumed it in 11/2021. Calcium and GFR normal . On Fosamax now, planning to switch to Reclast on 09/25/2023 6/28/2024:  Active RA, significant hand pain affecting quality of life. Recommend adding actemra; risks were discussed.    Plan:  Continue HCQ 200mg every day with yearly eye exam  Continue prednisone 3 mg a day  Start actemra when it gets approved by insurance  Labs 1 month after start of actemra   Return in person in about 4 months     TODAY 10/23/24:  Patient has ongoing pain to bilateral hands and persistent morning stiffness. Exam with mild synovitis to left dorsal wrist. Has not noticed any clinical benefit with Actemra. Discussed options with patient, and she would prefer to switch to Orencia at this time. Reviewed slow mechanism of action for medication- would not anticipate full benefit for several months. Treatment options otherwise. Has not had benefit with SSZ. Would avoid agents that increase risk for lymphoma given history including Methotrexate, JAK2 inhibitors, anti-TNF. Discussed that Rituximab can decrease immune system, but it is a good alternative option given fda approved indication for lymphoma and she tolerated it in the past. Uncontrolled neuropathy also likely contributing to pain and will be addressed by PCP.     PLAN:  - Stop Actemra [id number]  - Start Orencia weekly; would not anticipate benefit for several months; risks were discussed  - Labs today as below  - Continue  mg daily with annual eye exam   -  Continue Prednisone 3 mg daily   - Return in 4-5 months in person for reevaluation  - Consider increasing Gabapentin dose by PCP for neuropathic component     Orders Placed This Encounter   Procedures     CRP inflammation     Erythrocyte sedimentation rate auto     ALT     Albumin level     AST     Creatinine     CBC with Platelets & Differential     Seen and staffed with Dr. Domínguez.    Eli Dumont MD  [facility], PGY-3      Attending Note: I saw and evaluated the patient with Dr. Dumont. I agree with the assessment and plan.      TT 40 min was spent on date of the encounter doing chart review, history and exam, documentation and further activities as noted above. Any prior notes, outside records, laboratory results, and imaging studies were reviewed if relevant.      The longitudinal plan of care for the diagnosis(es)/condition(s) as documented were addressed during this visit. Due to the added complexity in care, I will continue to support Amelia in the subsequent management and with ongoing continuity of care.      Maribell Domínguez MD      Again, thank you for allowing me to participate in the care of your patient.      Sincerely,    Maribell Domínguez MD

## 2024-10-23 NOTE — NURSING NOTE
"Chief Complaint   Patient presents with    RECHECK     Follow Up     /71   Pulse 86   Temp 98.7  F (37.1  C)   Ht 1.448 m (4' 9\")   Wt 56.2 kg (124 lb)   SpO2 96%   BMI 26.83 kg/m    Ina Shore on 10/23/2024 at 1:11 PM    "

## 2024-10-23 NOTE — PROGRESS NOTES
Last seen: 9/21/2023    DOS: 10/23/2024    Reason for in person follow up visit: RA, high risk med use    [facility] - Rheumatology Clinic Visit    Amelia Michel is a 61 year old here for rheumatoid arthritis (RA) dx 35 y/o. +CCP >331 -RF -KALYAN panel. 5-2108 XR 5-2018 DDD and facet osteoarthritis, congential fusion C2-3) prednisone since dx at 35 y/o, hydroxychloroquine (plaquenil) long-time since dx tapered by 200 mg 9-2019. Lymphoma in liver, heart, bone and mass between esophagus, left pelvic, right ankle.     Review-Dr. Dumas in [facility] for many years. She had relatively inactive disease prior to her arrest but was on methotrexate 10 mg PO per week, Arava 10 mg daily, Plaquenil 200 mg twice per day, and prednisone 3 mg daily. She has had partial fusion of her right wrist. After discharge from the hospital she was put on prednisone 5 mg daily as monotherapy. Sulfasalazine -not effective long-time, initial alan but resolved after retried. Past Dr. Cunha and Dr. 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 Treated concervatively with plaquenil and 5mg prednisone, and not interested in rituximab or other biologics. She has previously been treated with sulfasalazine and initially developed a rash which was initially thought to be a sulfa rash, but her rash resolved and did not reoccur after being placed back on sulfasalazine. She was also treated with Arava, but this discontinued during her cancer treatment. She feels that these drugs have had no effect on her symptoms. Etanercept (enbrel) in past no benefits.     March 10, 2021  Denies any fever, chills, SOB, MONTALVO, night sweats, or chest pain, high fever, cough, travel in last 14 days or exposure to covid-19 (coronavirus). Reports healthy. Follows CDC guidelines. Sinus issues for a week other then that fine. Not out tho either.  "Will be getting vaccine signing in now.     Arthritis is hand bothering her. Left worse, left hand neuropathy from antecubital IV reaction \"take out muscle and fat\". Its her thumb gets her issues. Hurts all the time.  Not swelling. Base of thumb some tingling. The same. With her history cardiac arrest and chemo does have splint, does not need OT. Walking now and mentally doing good.      Right ankle triple arthrodesis Oct 2020, learning how to walk properly. \"much better\".  Doing more more active.     Fosamax 70 mg every 7 days restarted 9-2020 per Dr. Maribell Domínguez, was on with PCP  In past \"not concerned on for too long\" will need future time off of this was off for 18 month. Prednisone 5 mg once day chronically, hydroxychloroquine (plaquenil) 200 mg once day -eye exam in Dec 2020 early catarct          9/9/2021: Ms. Michel is doing fairly well with no major complaints or RA flare ups on  mg qd+predniosne 5 mg every day. Eye exam is updated. Fully vaccinated. PCP told her to hold fosamax till further discussion in Oct.      Has pain over palms. This gets worse throughout the day. No AM stiffness. Does more chores around the house which is a trigger.     1st covid vaccine caused body ache, second dose caused fatigue.    3/9/2022:    No joint swelling, has edema due HF, joints hurt, warm shower helps, lost strength, has stiffness all day.    Eye exam is due 1/2023    Off pred since 3/3 .    9/16/2022:    Amelia is here for follow up. Has pain over R foot, is back on PT,     Has problem with fine motor, it's hard to hold a plate.    Has SOB on and off due to Afib, CHF. BP is up. Seeing cardiology.    No am stiffness.    No oral ulcers.    No rash.    Back on pred 3 mg every day.    Back on fosamax.      09/21/2023  Overall reports feeling more pain .   - She has a new sharp left buttock pain radiating to the left thigh and stopping at the knee x3 months. No specific exacerbating or relieving factors.   - " She reports progressively worsening right ankle pain. In 2020 this joint was surgically fused and since then has progressively inverted and affecting her gait. She is now having increasing right ankle, knee, and hip pain.   - Chronic left knee pain worsening over time. She has had 3 joints injections in the knee but without significant improvement.    - States hands have been more crampy. She denies redness/heat/swelling. States the chronic deformities and cramping affects her dexterity and worsens her ability to complete some activities like cooking    Last saw oncology 9/14 with good report    Current medications include Plaquenil and Prednisone 3mg. Tylenol occasionally but no relief. Last eye exam 01/2023, next already scheduled for 01/2024.     At this time interested in exploring other medications due to increased pain. Questions if pain management may be beneficial as well.   - Currently on Fosamax, start Reclast on Monday    ROS   + dry itchy eyes and itchy ears (improves with claritin)   - No fevers, weight loss, night sweats, chest pain, dyspnea, cough, N/V/D, eye pain or vision changes, nail changes, mouth sores, raynauds,       6/28/2024:  -had L hip arthroplasty on 1/24/2024 after fracture, recovered  -has neuropathy, hard to do things with neuropathy  -pain mx did not help with joint pain  -hands are stiff more at night, wakes her up  -MCPs are hurting at palmar side, more on L side  -had R ankle arthrodesis, wears a brace pain on and off. Does exercise and does massage tx for tony horse pain  -no joint swelling  -her plaquenil dose is 1 tab of 200 mg a day    TODAY 10/23/24:  - PMH seropositive RA, OA s/p L hip arthroplasty 1/2024, hx lymphoma   - Current regimen: Plaquenil 200 mg daily, Prednisone 3 mg daily, Acemra 162 mg [id number], Reclast   - Last evaluated 6/2024. Started Actemra 7/2024 Monday's. Labs 9/2024 with WBC 3.4,   - Pain: Ongoing. Mainly to bilateral palms, burning pain, right  "hand occasionally swelling, both get stiff, no redness or warmth. Also has pain to gluteal on both sides- no trauma, newer, no redness/swelling/warmth, has tried massage with little relief.   - Sometimes has trigger finger in fourth finger. Has known neuropathy. Saw Neurology without much improvement, recommended nerve biopsy. Gabapentin 400 mg daily currently.   - Has not noticed any difference since starting Actemra. OK to give more time, but if not helping would  - Has eye exam scheduled 1/2025  - No recent infections        ROS: A comprehensive ROS was done. Positives are per HPI.      PMH:  Injury-left thumb amputation, left finger broke  Medical-  Antiplatelet or antithrombotic long-term use  Arrhythmia  Atrial tachycardia, flutter  Paroxysmal atrial fibrillation  Arthritis  Lymphoma  Cardiac arrest  history of chemotherapy  Primary pulmonary hypertension  Neuropathy  Postoperative nausea and vomiting  Rheumatoid arthritis  Hyperlipidemia LDL goal <130  Lymphedema of both lower extremities  Cardiogenic shock and cardiac arrest 5/2017  Acute respiratory failure with hypoxia  Critical illness myopathy  Sepsis  Wound of left upper extremity  Diffuse large B-cell lymphoma of extranodal site  Febrile neutropenia  Physical deconditioning  Palpitations  Osteoporosis  -DEXA 2018   Slowly healing wound in medial left antecubital fossa after traumatic IV  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, latter felt from lymphoma   Neuropathy of right foot with weakness after intrathecal cytarabine  perioditis   Right arm PICC blood clots during cancer treatments  Right shoulder effusion when had pericarditis       Past Surgical History:  Anesthesia cardioversion  Right wrist arthrodesis, partial fusion \"history migrated out\"  Bone marrow aspirate & biopsy  Excise lesion upper extremity - left wound excision and closure, possible submuscular transposition of ulnar nerve  Foot surgery - 4 left, 2 " "right  Carpal tunnel bilateral release  Repair ventricular septal defect  Transpositional ulnar nerve (elbow) left   Right shoulder effusion history     Family History:   No autoimmune disorders, psoriasis, UC, crohn's, SLE, RA, PsA, gout, autoimmune thyroid.  No MS, heart disease in family  Mother - breast cancer, hypertension, alzheimer disease-passed   Father - hypertension, lymphoma, circulatory A fib-passed  Paternal grandmother - diabetes  Siblings-younger sister heatlhy PB, younger brother think arthritis not dx PB     Social History:   No smoking or tobacco use. No alcohol use. No IVDU. None Children. Right handed. . Disability          ROS:    A comprehensive ROS was done. Positives are per HPI.      Ph. E:    /71   Pulse 86   Temp 98.7  F (37.1  C)   Ht 1.448 m (4' 9\")   Wt 56.2 kg (124 lb)   SpO2 96%   BMI 26.83 kg/m      Constitutional: NAD, pleasant, ambulates with a cane  Eyes: Normal EOM, conjunctiva, sclera  ENT: no oral ulcers  Neck: No mass or thyroid enlargement  Chest: old sternotomy scar, pacemaker implanted on left chest wall, good perfusion. CTAB  CV: no M/R/G, irregular HR (Afib)  Abdomen: soft, NT  Lymph: No cervical, supraclavicular nodes  MS: Ulnar deviation of hands. Evidence of mild active synovitis left dorsal wrist. Chronic RA and surgical deformities. R ankle in brace.  Skin: No rash  Neuro: grossly non focal  Psych: Normal affect.     Labs/Imaging:  Eye Exam (10/23/18)  Significant for mild H-lated nuclear cataracts.   Ocular CT was recommended for right eye.     CT Chest/Abdomen/Pelvis (9/26/18)  In this patient with a history of lymphoma:  1. No evidence of disease recurrence, consistent with complete  response per Lugano criteria.  2. Stable cardiomegaly. Improved pericardial effusion.  3. Early contrast phase with heterogeneous visceral enhancement in the  abdomen, likely secondary to cardiac dysfunction.    MRI Cardiac w/contrast (4/12/18)  Loculated " exudative pericardial effusion that is beginning to organize, with diffuse  pericardial enhancement consistent with ongoing inflammation. Normal LV fxn, LVEF 54% and RVEF 49%. At  least moderate, possibly severe tricuspid regurgitation. Compared to MRI from 03/2018, effusion is largely  unchanged in size (maybe a bit smaller) but is starting to organize. No change in pericardial enhancement.    Laboratory:   Component      Latest Ref Rng & Units 3/25/2021 5/26/2021 6/23/2021 7/21/2021   WBC      4.0 - 11.0 thou/uL 5.6      RBC Count      3.80 - 5.40 mill/uL 4.31      Hemoglobin      12.0 - 16.0 g/dL 15.4      Hematocrit      35.0 - 47.0 % 44.6      MCV      80 - 100 fL 104 (H)      MCH      27.0 - 34.0 pg 35.7 (H)      MCHC      32.0 - 36.0 g/dL 34.5      RDW      11.0 - 14.5 % 14.4      Platelet Count      140 - 440 thou/uL 126 (L)      Mean Platelet Volume      7.0 - 10.0 fL 9.5      % Neutrophils      50 - 70 % 80 (H)      % Lymphocytes      20 - 40 % 10 (L)      % Monocytes      2 - 10 % 9      % Eosinophils      0 - 6 % 1      % Basophils      0 - 2 % 0      % Immature Granulocytes      <=0 % 0      Absolute Neutrophils      2.0 - 7.7 thou/uL 4.5      Absolute Lymphocytes      0.8 - 4.4 thou/uL 0.6 (L)      Absolute Monocytes      0.0 - 0.9 thou/uL 0.5      Eosinophils Absolute      0.0 - 0.4 thou/uL 0.0      Absolute Basophils      0.0 - 0.2 thou/uL 0.0      Absolute Immature Granulocytes      <=0.0 thou/uL 0.0      Sodium      133 - 144 mmol/L  136 136 137   Potassium      3.4 - 5.3 mmol/L  3.6 4.2 3.9   Chloride      94 - 109 mmol/L  101 102 103   Carbon Dioxide      20 - 32 mmol/L  24 28 28   Anion Gap      3 - 14 mmol/L  11 6 6   Glucose      70 - 99 mg/dL  100 (H) 98 110 (H)   Urea Nitrogen      7 - 30 mg/dL  21 25 29   Creatinine      0.52 - 1.25 mg/dL  1.00 1.05 (H) 1.02   GFR Estimate      >60 mL/min/1.73m2  61 57 (L) 60 (L)   GFR Estimate If Black      >60 mL/min/1.73:m2  71 67    Calcium      8.5 -  10.1 mg/dL  9.3 10.0 9.6   Bilirubin Direct      0.0 - 0.2 mg/dL   0.5 (H)    Bilirubin Total      0.2 - 1.3 mg/dL   1.2    Albumin      3.4 - 5.0 g/dL   4.2    Protein Total      6.8 - 8.8 g/dL   7.5    Alkaline Phosphatase      40 - 150 U/L   136    ALT      0 - 50 U/L   41    AST      0 - 45 U/L   36    N-Terminal Pro Bnp      0 - 125 pg/mL  1,274 (H)     Hemoglobin A1C      0 - 5.6 %  6.2 (H)     Immunoglobulin G      700-1,700 mg/dL 1,179          Impression/Plan:  1. Seropositive rheumatoid arthritis (, RF 31) with multiple joint disability including MTP fusion 1-5 bilaterally, partial right wrist fusion, subluxation and ulnar deformity of MCP's. Rheumatoid arthritis (RA) controlled. Continue prednisone at 3 mg every day (more pain, stiffness off prednisone when she was off), and hydroxychloroquine (plaquenil) 200 mg once a day. Rheumatoid arthritis does not seem to be flaring today, however her joint deformities are beginning to affect her quality of life due to reduced dexterity. Given her history of Lymphoma would stay away from TNF inhibitors. Will consider Actemra for better control. Will order screening labs today and refer to pain management in the interim.     2. Long-term hydroxychloroquine (plaquenil) use since 6-2017, no toxicity so far, reviewed AAO guidelines, repeat every year.    3. Diffuse large B-cell lymphoma status-post 1 cycle R-EPOCH, 6 cycles R-CHOP; Neuropathy of lower extremities attributed to lymphoma improved on gabapentin 200 mg TID-tolerating. Loculated pericardial effusion, etiology unclear (lymphoma). No symptoms now. Last saw Oncology 09/2023 with good report.    4. Osteoporosis, chronic long-term corticosteroid use with cushingoid appearance. Received 6-7 years of alendronate in early 2000s. Restarted alendronate in 1/2019, off 3-2020 to 9-2020 then restarted 9-2020. DEXA  T-2.6 thoracic. If > 5 yr higher risk atypical Fx. Off fosamax at last visit given  improvement of bone density in 3/2021 but on chronic steroid tx, did refer to endocrinology to manage steroid induced osteoporosis and endocrinology resumed it in 11/2021. Calcium and GFR normal . On Fosamax now, planning to switch to Reclast on 09/25/2023 6/28/2024:  Active RA, significant hand pain affecting quality of life. Recommend adding actemra; risks were discussed.    Plan:  Continue HCQ 200mg every day with yearly eye exam  Continue prednisone 3 mg a day  Start actemra when it gets approved by insurance  Labs 1 month after start of actemra   Return in person in about 4 months     TODAY 10/23/24:  Patient has ongoing pain to bilateral hands and persistent morning stiffness. Exam with mild synovitis to left dorsal wrist. Has not noticed any clinical benefit with Actemra. Discussed options with patient, and she would prefer to switch to Orencia at this time. Reviewed slow mechanism of action for medication- would not anticipate full benefit for several months. Treatment options otherwise. Has not had benefit with SSZ. Would avoid agents that increase risk for lymphoma given history including Methotrexate, JAK2 inhibitors, anti-TNF. Discussed that Rituximab can decrease immune system, but it is a good alternative option given fda approved indication for lymphoma and she tolerated it in the past. Uncontrolled neuropathy also likely contributing to pain and will be addressed by PCP.     PLAN:  - Stop Actemra [id number]  - Start Orencia weekly; would not anticipate benefit for several months; risks were discussed  - Labs today as below  - Continue  mg daily with annual eye exam   - Continue Prednisone 3 mg daily   - Return in 4-5 months in person for reevaluation  - Consider increasing Gabapentin dose by PCP for neuropathic component     Orders Placed This Encounter   Procedures    CRP inflammation    Erythrocyte sedimentation rate auto    ALT    Albumin level    AST    Creatinine    CBC with  Platelets & Differential     Seen and staffed with Dr. Domínguez.    Eli Dumont MD  [facility], PGY-3      Attending Note: I saw and evaluated the patient with Dr. Dumont. I agree with the assessment and plan.      TT 40 min was spent on date of the encounter doing chart review, history and exam, documentation and further activities as noted above. Any prior notes, outside records, laboratory results, and imaging studies were reviewed if relevant.      The longitudinal plan of care for the diagnosis(es)/condition(s) as documented were addressed during this visit. Due to the added complexity in care, I will continue to support Amelia in the subsequent management and with ongoing continuity of care.      Maribell Domínguez MD

## 2024-10-24 ENCOUNTER — TELEPHONE (OUTPATIENT)
Dept: RHEUMATOLOGY | Facility: CLINIC | Age: 64
End: 2024-10-24
Payer: COMMERCIAL

## 2024-10-24 RX ORDER — TOCILIZUMAB 180 MG/ML
INJECTION, SOLUTION SUBCUTANEOUS
Qty: 5.4 ML | OUTPATIENT
Start: 2024-10-24

## 2024-10-24 NOTE — TELEPHONE ENCOUNTER
PA Initiation    Medication: ORENCIA CLICKJECT 125 MG/ML SC SOAJ  Insurance Company: [employer] Non-Specialty PA's - Phone [phone] Fax [phone]  Pharmacy Filling the Rx:    Filling Pharmacy Phone:    Filling Pharmacy Fax:    Start Date: 10/24/2024  Rheumatoid arthritis involving multiple sites with positive rheumatoid factor (H) [M05.79]           tolu gu (Bhatti: [id number])      TRACEY Estes, [facility]  Specialty Pharmacy Clinic Liaison     [facility]    [email or web address]     Phone: [phone]  Fax: [phone]

## 2024-10-29 NOTE — TELEPHONE ENCOUNTER
Prior Authorization Approval    Medication: ORENCIA CLICKJECT 125 MG/ML SC SOAJ  Authorization Effective Date: 10/29/2024  Authorization Expiration Date: 4/25/2025  Approved Dose/Quantity: 4  Reference #: [id number]   Insurance Company: [employer] Non-Specialty PA's - Phone [phone] Fax [phone]  Expected CoPay: $    CoPay Card Available: Yes    Financial Assistance Needed: copay card eligible  Which Pharmacy is filling the prescription: [address]              TRACEY Estes, [facility]  Specialty Pharmacy Clinic Liaison     [facility]    [email or web address]     Phone: [phone]  Fax: [phone]

## 2024-11-06 ENCOUNTER — ANCILLARY PROCEDURE (OUTPATIENT)
Dept: CARDIOLOGY | Facility: CLINIC | Age: 64
End: 2024-11-06
Attending: INTERNAL MEDICINE
Payer: COMMERCIAL

## 2024-11-06 DIAGNOSIS — R00.1 BRADYCARDIA: ICD-10-CM

## 2024-11-06 PROCEDURE — 93294 REM INTERROG EVL PM/LDLS PM: CPT | Performed by: INTERNAL MEDICINE

## 2024-11-06 PROCEDURE — 93296 REM INTERROG EVL PM/IDS: CPT

## 2024-11-07 LAB
MDC_IDC_EPISODE_DTM: NORMAL
MDC_IDC_EPISODE_DURATION: 0 S
MDC_IDC_EPISODE_DURATION: 1 S
MDC_IDC_EPISODE_DURATION: 100 S
MDC_IDC_EPISODE_DURATION: 1071 S
MDC_IDC_EPISODE_DURATION: 1074 S
MDC_IDC_EPISODE_DURATION: 1137 S
MDC_IDC_EPISODE_DURATION: 116 S
MDC_IDC_EPISODE_DURATION: 1183 S
MDC_IDC_EPISODE_DURATION: 127 S
MDC_IDC_EPISODE_DURATION: 133 S
MDC_IDC_EPISODE_DURATION: 1342 S
MDC_IDC_EPISODE_DURATION: 1388 S
MDC_IDC_EPISODE_DURATION: 165 S
MDC_IDC_EPISODE_DURATION: 1748 S
MDC_IDC_EPISODE_DURATION: 194 S
MDC_IDC_EPISODE_DURATION: 1950 S
MDC_IDC_EPISODE_DURATION: 2 S
MDC_IDC_EPISODE_DURATION: 202 S
MDC_IDC_EPISODE_DURATION: 21 S
MDC_IDC_EPISODE_DURATION: 22 S
MDC_IDC_EPISODE_DURATION: 299 S
MDC_IDC_EPISODE_DURATION: 3 S
MDC_IDC_EPISODE_DURATION: 308 S
MDC_IDC_EPISODE_DURATION: 317 S
MDC_IDC_EPISODE_DURATION: 329 S
MDC_IDC_EPISODE_DURATION: 34 S
MDC_IDC_EPISODE_DURATION: 35 S
MDC_IDC_EPISODE_DURATION: 3556 S
MDC_IDC_EPISODE_DURATION: 36 S
MDC_IDC_EPISODE_DURATION: 3942 S
MDC_IDC_EPISODE_DURATION: 45 S
MDC_IDC_EPISODE_DURATION: 46 S
MDC_IDC_EPISODE_DURATION: 4635 S
MDC_IDC_EPISODE_DURATION: 49 S
MDC_IDC_EPISODE_DURATION: 50 S
MDC_IDC_EPISODE_DURATION: 52 S
MDC_IDC_EPISODE_DURATION: 53 S
MDC_IDC_EPISODE_DURATION: 59 S
MDC_IDC_EPISODE_DURATION: 596 S
MDC_IDC_EPISODE_DURATION: 60 S
MDC_IDC_EPISODE_DURATION: 600 S
MDC_IDC_EPISODE_DURATION: 71 S
MDC_IDC_EPISODE_DURATION: 75 S
MDC_IDC_EPISODE_DURATION: 826 S
MDC_IDC_EPISODE_DURATION: 93 S
MDC_IDC_EPISODE_DURATION: 986 S
MDC_IDC_EPISODE_DURATION: NORMAL S
MDC_IDC_EPISODE_ID: NORMAL
MDC_IDC_EPISODE_TYPE: NORMAL
MDC_IDC_LEAD_CONNECTION_STATUS: NORMAL
MDC_IDC_LEAD_CONNECTION_STATUS: NORMAL
MDC_IDC_LEAD_IMPLANT_DT: NORMAL
MDC_IDC_LEAD_IMPLANT_DT: NORMAL
MDC_IDC_LEAD_LOCATION: NORMAL
MDC_IDC_LEAD_LOCATION: NORMAL
MDC_IDC_LEAD_LOCATION_DETAIL_1: NORMAL
MDC_IDC_LEAD_LOCATION_DETAIL_1: NORMAL
MDC_IDC_LEAD_MFG: NORMAL
MDC_IDC_LEAD_MFG: NORMAL
MDC_IDC_LEAD_MODEL: NORMAL
MDC_IDC_LEAD_MODEL: NORMAL
MDC_IDC_LEAD_POLARITY_TYPE: NORMAL
MDC_IDC_LEAD_POLARITY_TYPE: NORMAL
MDC_IDC_LEAD_SERIAL: NORMAL
MDC_IDC_LEAD_SERIAL: NORMAL
MDC_IDC_LEAD_SPECIAL_FUNCTION: NORMAL
MDC_IDC_LEAD_SPECIAL_FUNCTION: NORMAL
MDC_IDC_MSMT_BATTERY_DTM: NORMAL
MDC_IDC_MSMT_BATTERY_REMAINING_LONGEVITY: 100 MO
MDC_IDC_MSMT_BATTERY_RRT_TRIGGER: 2.62
MDC_IDC_MSMT_BATTERY_STATUS: NORMAL
MDC_IDC_MSMT_BATTERY_VOLTAGE: 2.98 V
MDC_IDC_MSMT_LEADCHNL_RA_IMPEDANCE_VALUE: 285 OHM
MDC_IDC_MSMT_LEADCHNL_RA_IMPEDANCE_VALUE: 399 OHM
MDC_IDC_MSMT_LEADCHNL_RA_PACING_THRESHOLD_AMPLITUDE: 0.5 V
MDC_IDC_MSMT_LEADCHNL_RA_PACING_THRESHOLD_PULSEWIDTH: 0.4 MS
MDC_IDC_MSMT_LEADCHNL_RA_SENSING_INTR_AMPL: 1.88 MV
MDC_IDC_MSMT_LEADCHNL_RV_IMPEDANCE_VALUE: 418 OHM
MDC_IDC_MSMT_LEADCHNL_RV_IMPEDANCE_VALUE: 494 OHM
MDC_IDC_MSMT_LEADCHNL_RV_PACING_THRESHOLD_AMPLITUDE: 0.62 V
MDC_IDC_MSMT_LEADCHNL_RV_PACING_THRESHOLD_PULSEWIDTH: 0.4 MS
MDC_IDC_MSMT_LEADCHNL_RV_SENSING_INTR_AMPL: 17.38 MV
MDC_IDC_PG_IMPLANT_DTM: NORMAL
MDC_IDC_PG_MFG: NORMAL
MDC_IDC_PG_MODEL: NORMAL
MDC_IDC_PG_SERIAL: NORMAL
MDC_IDC_PG_TYPE: NORMAL
MDC_IDC_SESS_CLINIC_NAME: NORMAL
MDC_IDC_SESS_DTM: NORMAL
MDC_IDC_SESS_TYPE: NORMAL
MDC_IDC_SET_BRADY_AT_MODE_SWITCH_RATE: 171 {BEATS}/MIN
MDC_IDC_SET_BRADY_HYSTRATE: NORMAL
MDC_IDC_SET_BRADY_LOWRATE: 75 {BEATS}/MIN
MDC_IDC_SET_BRADY_MAX_SENSOR_RATE: 130 {BEATS}/MIN
MDC_IDC_SET_BRADY_MAX_TRACKING_RATE: 130 {BEATS}/MIN
MDC_IDC_SET_BRADY_MODE: NORMAL
MDC_IDC_SET_BRADY_PAV_DELAY_LOW: 180 MS
MDC_IDC_SET_BRADY_SAV_DELAY_LOW: 150 MS
MDC_IDC_SET_LEADCHNL_RA_PACING_AMPLITUDE: 1.5 V
MDC_IDC_SET_LEADCHNL_RA_PACING_ANODE_ELECTRODE_1: NORMAL
MDC_IDC_SET_LEADCHNL_RA_PACING_ANODE_LOCATION_1: NORMAL
MDC_IDC_SET_LEADCHNL_RA_PACING_CAPTURE_MODE: NORMAL
MDC_IDC_SET_LEADCHNL_RA_PACING_CATHODE_ELECTRODE_1: NORMAL
MDC_IDC_SET_LEADCHNL_RA_PACING_CATHODE_LOCATION_1: NORMAL
MDC_IDC_SET_LEADCHNL_RA_PACING_POLARITY: NORMAL
MDC_IDC_SET_LEADCHNL_RA_PACING_PULSEWIDTH: 0.4 MS
MDC_IDC_SET_LEADCHNL_RA_SENSING_ANODE_ELECTRODE_1: NORMAL
MDC_IDC_SET_LEADCHNL_RA_SENSING_ANODE_LOCATION_1: NORMAL
MDC_IDC_SET_LEADCHNL_RA_SENSING_CATHODE_ELECTRODE_1: NORMAL
MDC_IDC_SET_LEADCHNL_RA_SENSING_CATHODE_LOCATION_1: NORMAL
MDC_IDC_SET_LEADCHNL_RA_SENSING_POLARITY: NORMAL
MDC_IDC_SET_LEADCHNL_RA_SENSING_SENSITIVITY: 0.3 MV
MDC_IDC_SET_LEADCHNL_RV_PACING_AMPLITUDE: 2 V
MDC_IDC_SET_LEADCHNL_RV_PACING_ANODE_ELECTRODE_1: NORMAL
MDC_IDC_SET_LEADCHNL_RV_PACING_ANODE_LOCATION_1: NORMAL
MDC_IDC_SET_LEADCHNL_RV_PACING_CAPTURE_MODE: NORMAL
MDC_IDC_SET_LEADCHNL_RV_PACING_CATHODE_ELECTRODE_1: NORMAL
MDC_IDC_SET_LEADCHNL_RV_PACING_CATHODE_LOCATION_1: NORMAL
MDC_IDC_SET_LEADCHNL_RV_PACING_POLARITY: NORMAL
MDC_IDC_SET_LEADCHNL_RV_PACING_PULSEWIDTH: 0.4 MS
MDC_IDC_SET_LEADCHNL_RV_SENSING_ANODE_ELECTRODE_1: NORMAL
MDC_IDC_SET_LEADCHNL_RV_SENSING_ANODE_LOCATION_1: NORMAL
MDC_IDC_SET_LEADCHNL_RV_SENSING_CATHODE_ELECTRODE_1: NORMAL
MDC_IDC_SET_LEADCHNL_RV_SENSING_CATHODE_LOCATION_1: NORMAL
MDC_IDC_SET_LEADCHNL_RV_SENSING_POLARITY: NORMAL
MDC_IDC_SET_LEADCHNL_RV_SENSING_SENSITIVITY: 0.9 MV
MDC_IDC_SET_ZONE_DETECTION_INTERVAL: 350 MS
MDC_IDC_SET_ZONE_DETECTION_INTERVAL: 400 MS
MDC_IDC_SET_ZONE_STATUS: NORMAL
MDC_IDC_SET_ZONE_STATUS: NORMAL
MDC_IDC_SET_ZONE_TYPE: NORMAL
MDC_IDC_SET_ZONE_VENDOR_TYPE: NORMAL
MDC_IDC_STAT_AT_BURDEN_PERCENT: 4.1 %
MDC_IDC_STAT_AT_DTM_END: NORMAL
MDC_IDC_STAT_AT_DTM_START: NORMAL
MDC_IDC_STAT_BRADY_AP_VP_PERCENT: 85.84 %
MDC_IDC_STAT_BRADY_AP_VS_PERCENT: 0.5 %
MDC_IDC_STAT_BRADY_AS_VP_PERCENT: 12.96 %
MDC_IDC_STAT_BRADY_AS_VS_PERCENT: 0.7 %
MDC_IDC_STAT_BRADY_DTM_END: NORMAL
MDC_IDC_STAT_BRADY_DTM_START: NORMAL
MDC_IDC_STAT_BRADY_RA_PERCENT_PACED: 83.51 %
MDC_IDC_STAT_BRADY_RV_PERCENT_PACED: 98.66 %
MDC_IDC_STAT_EPISODE_RECENT_COUNT: 0
MDC_IDC_STAT_EPISODE_RECENT_COUNT: 12
MDC_IDC_STAT_EPISODE_RECENT_COUNT: 79
MDC_IDC_STAT_EPISODE_RECENT_COUNT_DTM_END: NORMAL
MDC_IDC_STAT_EPISODE_RECENT_COUNT_DTM_START: NORMAL
MDC_IDC_STAT_EPISODE_TOTAL_COUNT: 0
MDC_IDC_STAT_EPISODE_TOTAL_COUNT: 401
MDC_IDC_STAT_EPISODE_TOTAL_COUNT: 5499
MDC_IDC_STAT_EPISODE_TOTAL_COUNT: 815
MDC_IDC_STAT_EPISODE_TOTAL_COUNT: NORMAL
MDC_IDC_STAT_EPISODE_TOTAL_COUNT_DTM_END: NORMAL
MDC_IDC_STAT_EPISODE_TOTAL_COUNT_DTM_START: NORMAL
MDC_IDC_STAT_EPISODE_TYPE: NORMAL
MDC_IDC_STAT_TACHYTHERAPY_RECENT_DTM_END: NORMAL
MDC_IDC_STAT_TACHYTHERAPY_RECENT_DTM_START: NORMAL
MDC_IDC_STAT_TACHYTHERAPY_TOTAL_DTM_END: NORMAL
MDC_IDC_STAT_TACHYTHERAPY_TOTAL_DTM_START: NORMAL

## 2024-11-10 ENCOUNTER — MYC MEDICAL ADVICE (OUTPATIENT)
Dept: FAMILY MEDICINE | Facility: CLINIC | Age: 64
End: 2024-11-10
Payer: COMMERCIAL

## 2024-11-10 DIAGNOSIS — I89.0 LYMPHEDEMA OF BOTH LOWER EXTREMITIES: Primary | ICD-10-CM

## 2024-11-25 NOTE — PROGRESS NOTES
Electrophysiology Clinic Note  HPI:   Ms. Michel is a 56 year old female who has a past medical history significant for VSD s/p repair 1969, RA, HTN, insomnia, atrial tachyarrhythmias, cardiac arrest, and DLBCL.     She was admitted from 5/15/17-5/23/17 with atrial tachyarrhythmias (SVT, AF, AFL) with symptoms of palpitations, fatigue, and nausea that she had intermittently starting 3 weeks prior to admission. An echo showed LVEF 40-45%, mildly reduced RVEF, moderate biatrial enlargement, mild mitral regurgitation, and moderate tricuspid regurgitation. Prior CMRI from 2015 showed normal function and no significant valvular abnormalities. She was started on Eliquis. She underwent a BRE/DCCV on 5/17/17 c/b hypotension. She then had SVT HR 170s and then went into AF/AFL all within 24 hours after DCCV. She was started on Sotalol and chemically cardioverted. However, she had nausea and thought it was from the Sotalol so this was stopped. She reverted back to AF/AFL and a rate control strategy was adopted. She proved difficult to rate control and remained highly symptomatic. Therefore, she was started on amiodarone. She then converted back to sinus. She continued to report fatigued and nausea despite being in sinus. Given difficult to control arrhthymias, CMRI done showed LVEF 55%, mildly dilated RV with focal area of dyskinesis in RVOT, severe bi-atrial enlargement, severe TR, mild/moderate MR, and DE at RV septal insertion site. RFA was discussed but was defered as patient had a UTI at the time with ESBL. CRP was elevated and remained elevated 72 at discharge. She was sent with Macrobid.  She was then readmitted on 6/19/17-8/4/17 after suffering an out of hospital cardiac arrest. Her  found her gasping for air and unresponsive in bed. He moved her off the bed, started CPR, and activated EMS. Upon EMS arrival, she was in VF. She was externally defibrillated and had ROSC in the field. She was intubated and brought  to [facility]. In the [facility], she was hypotensive and found to be in escape rhythm 43 bpm. She was taken emergently to cath lab where coronary angiogram showed normal coronary arteries. Temporary pacemaker was placed in RA given sinus arrest. She was also placed on therapeutic hypothermia. Her  reported that she had a syncopal episode while sitting in a chair on 5/26/17 that she did not seek care for. She had been having nausea, diarrhea, and intermittent vomiting over the last week and generally had not been feeling well over the last month. She has also had saddle anesthesia with lower extremity numbness that has been evaluated by neuro as an outpatient. She was started on gabapentin but no conclusive reason. She was noted to have shock liver, CELSA requiring HD, elevated CRP. She continued to have intermittent episodes of AF. Her amiodarone was stopped. She had fevers, cytopenias and was ultimately found to have DLBCL. She was started on chemo cycles. 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 During hospitalization she also developed IV extravasation and necrotic wound. She also struggled with significant hypervolemia/anesarca. She ultimately discharged to TCU on a lifevest. She  was then readmitted on 8/8/17-8/12/17 with strep-mitis intermediate resistance bacteremia felt related to PICC line and neutropenic fevers. She was treated and ultimately discharged. She was then admitted from 9/7/17-9/11/17 with RUE DVT. An echo from 9/11/17 showed LVEF 60-65%, mild pulm HTN, and dilated IVC. She followed up with EP in 9/2017 and was making good strides in her recovery. She still had LUE wound which was still open. She reported that the lifevest was uncomfortable and she had returned it.     EP Visit 4/4/18:  Since her last visit, when Digoxin was held (in view of its interaction with the recently started Plaquenil), she has had three cardioversions. Over the last  one month, she has been to the ED on one occasion for AFib, was admitted with a heart failure exacerbation and cardioverted when she went into AF/RVR while in the hospital, and then on her [facility] clinic follow up was found to be in AF and cardioverted again. She says she has been feeling like she is in AFib since last Thursday again (6 days). She denies any significant SOB, LE edema, orthopnea, PND, chest pain, dizziness, lightheadedness, syncope or near syncope.     EP Visit 5/2/18:  Since her last visit, when her Digoxin was reinitiated, her symptoms have improved considerably. She underwent a successful DCCV and has remained in NSR since. She thinks she may have had one episode of AF that lasted a couple of hours and terminated spontaneously. Notably in the month prior to her Digoxin being reinitiated she had undergone three cardioversions for symptomatic AF. Her fatigue and SOB have also improved significantly. Her cMRI (detailed below) had shown some organization of her [ericardial effusion) and she has been initiated on colchicine. She is scheduled to undergo repeat imaging to look for recurrence of her lymphoma later this month.         PAST MEDICAL HISTORY:  Past Medical History:   Diagnosis Date     Atrial fibrillation (H)      Cancer (H)     lymphoma     Cardiac arrest (H)      Hypertension      Neuropathy      Rheumatoid arthritis(714.0)        CURRENT MEDICATIONS:  Current Outpatient Prescriptions   Medication Sig Dispense Refill     acetaminophen (TYLENOL) 325 MG tablet Take 2 tablets (650 mg) by mouth every 4 hours as needed for mild pain, fever or headaches       apixaban ANTICOAGULANT (ELIQUIS) 5 MG tablet Take 1 tablet (5 mg) by mouth 2 times daily 180 tablet 3     ascorbic acid 500 MG TABS Take 1 tablet (500 mg) by mouth daily 30 tablet 0     atenolol (TENORMIN) 25 MG tablet Take 1 tablet (25 mg) by mouth 2 times daily 60 tablet 3     calcium polycarbophil (FIBERCON) 625 MG tablet Take 1 tablet by  mouth 2 times daily       calcium-vitamin D (CALTRATE) 600-400 MG-UNIT per tablet Take 2 tablets by mouth every morning 60 tablet 0     colchicine 0.6 MG tablet Take 1 tablet (0.6 mg) by mouth daily 60 tablet 0     digoxin (LANOXIN) 125 MCG tablet Take 250mcg daily for 2 days, then decrease to 125mcg daily 90 tablet 3     furosemide (LASIX) 20 MG tablet Take 1 tablet (20 mg) by mouth daily 90 tablet 3     gabapentin (NEURONTIN) 100 MG capsule TAKE TWO CAPSULES BY MOUTH THREE TIMES A  capsule 3     HYDROCORTISONE PO Take 100 mg by mouth IV       hydroxychloroquine (PLAQUENIL) 200 MG tablet Take 1 tablet (200 mg) by mouth 2 times daily 60 tablet 5     multivitamin, therapeutic with minerals (THERA-VIT-M) TABS tablet Take 1 tablet by mouth daily 30 each 0     order for DME Equipment being ordered: BP cuff 1 Device 1     predniSONE (DELTASONE) 5 MG tablet Take 1 tablet (5 mg) by mouth daily 90 tablet 2     spironolactone (ALDACTONE) 25 MG tablet Take 1 tablet (25 mg) by mouth daily 30 tablet 3     tolterodine (DETROL LA) 4 MG 24 hr capsule Take 1 capsule (4 mg) by mouth daily 30 capsule 11       PAST SURGICAL HISTORY:  Past Surgical History:   Procedure Laterality Date     ANESTHESIA CARDIOVERSION N/A 5/17/2017    Procedure: ANESTHESIA CARDIOVERSION;  ANESTHESIA CARDIOVERSION;  Surgeon: GENERIC ANESTHESIA PROVIDER;  Location: [facility]     ANESTHESIA CARDIOVERSION  3/12/2018    Procedure: ANESTHESIA CARDIOVERSION;;  Surgeon: GENERIC ANESTHESIA PROVIDER;  Location: [facility]     ANESTHESIA CARDIOVERSION N/A 3/23/2018    Procedure: ANESTHESIA CARDIOVERSION;  Anesthesia Cardioversion ;  Surgeon: GENERIC ANESTHESIA PROVIDER;  Location: [facility]     ANESTHESIA CARDIOVERSION N/A 4/12/2018    Procedure: ANESTHESIA CARDIOVERSION;  Cardioversion;  Surgeon: GENERIC ANESTHESIA PROVIDER;  Location: [facility] OR     ARTHRODESIS WRIST  2000    Right wrist     BONE MARROW ASPIRATE &BIOPSY  7/12/2017          FOOT SURGERY      4 left and 2 right  "    RELEASE CARPAL TUNNEL BILATERAL       REPAIR VENTRICULAR SEPTAL DEFECT  1969       ALLERGIES:     Allergies   Allergen Reactions     Blood Transfusion Related (Informational Only) Hives     Hives with platelets. Give benadryl premedication.     Metoprolol Other (See Comments)     Pt and  report that metoprolol does not work for her and also reports feeling unwell with this medication. She has been able to tolerate atenolol, which as worked in controlling her HR.      No Clinical Screening - See Comments      Penicillin Allergy Skin Test not performed, see antimicrobial management team progress note 7/5/17.       Penicillins      Tape [Adhesive Tape] Rash       FAMILY HISTORY:  Family History   Problem Relation Age of Onset     Breast Cancer Mother      Hypertension Mother      Alzheimer Disease Mother      Hypertension Father      Blood Disease Father      Lymphoa     Circulatory Father      A Fib     DIABETES Paternal Grandmother        SOCIAL HISTORY:  Social History   Substance Use Topics     Smoking status: Never Smoker     Smokeless tobacco: Never Used     Alcohol use Yes      Comment: Glass of wine two evenings a night       ROS:   A comprehensive 10 point review of systems negative other than as mentioned in HPI.  Exam:  /78 (BP Location: Right arm, Patient Position: Chair, Cuff Size: Adult Regular)  Pulse 53  Ht 1.473 m (4' 10\")  Wt 51.6 kg (113 lb 12.8 oz)  SpO2 97%  BMI 23.78 kg/m2  GENERAL APPEARANCE: alert and no distress  HEENT: alopecia, MMM, PERRLA.   NECK:supple.   RESPIRATORY: lungs clear to auscultation - no rales, rhonchi or wheezes, no use of accessory muscles, no retractions, respirations are unlabored, normal respiratory rate  CARDIOVASCULAR: normal S1 with physiologic split S2, no S3 or S4 and no murmur, click or rub, precordium quiet with normal PMI.  ABDOMEN: soft, non tender,bowel sounds normal  EXTREMITIES: peripheral pulses normal, trace pedal edema, LUE wound with " dressing CDI.  NEURO: alert and oriented to person/place/time, normal speech, gait and affect  SKIN: pale, fragile, no rashes    Labs:  Reviewed.     Testing/Procedures:  9/11/17 ECHOCARDIOGRAM:   Interpretation Summary  Left ventricular function, chamber size, wall motion, and wall thickness are  normal.The EF is 60-65% (traced 60%.) GLS -18%.  The right ventricle is normal size and normal in function. The RV is mildly  dilated.  Moderate tricuspid insufficiency is present.  Mild pulmonary hypertension is present (esimated PASP 55 mmHg including RA  pressure.)  Dilation of the inferior vena cava is present with abnormal respiratory  variation in diameter (esitimated RAP 15 mmHg.)  This study was compared with the study from 8/31/17: TR appears slightly  decreased from the earlier study.    7/24/17 CMRI:     1. The LV is normal in cavity size and wall thickness. The global systolic function is normal. The LVEF is  64%. There are no regional wall motion abnormalities. No evidence of myocardial iron overload.      2. The RV is normal in cavity size. The global systolic function is normal. The RVEF is 59%.      3. There is moderate dilation of bilateral atria.      4. Tricuspid regurgitation is present, difficult to quantify due to image quality.      5. Late gadolinium enhancement imaging shows RV insertion site hyperenhancement, a non-specific finding  seen in patients with RV volume/pressure overload.      6. There is a moderate right pleural effusion and small left pleural effusion.      CONCLUSIONS: Normal Bi-Ventricular systolic function, LVEF 64% and RVE 59%. There is no evidence of  infiltrative disease. Compared to the MRI from 5/15/2017, there is no significant change.     7/2017 BRE:  Interpretation Summary  There is a small mass (0.7 cm by 0.2 cm) on the ventricular side of the right  coronary cusp. There is a second mass (0.4 cm by 0.2 cm) observed in the LVOT,  attached to the mitro-aortic curtain. There  is a third mass on the noncoronary  cusp that measures 0.4 cm by 0.2 cm that could be a healing vegetation. These  findings are compatible with endocarditis if clinical picture supports.  Right ventricular function, chamber size, wall motion, and thickness are  normal.  This study was compared with the study from 7/10/2017.  There is detection of masses on the aortic valve and LVOT.    Cardiac MRI - 4/12/18:  1. The LV is normal in cavity size and wall thickness. The global systolic function is normal. The LVEF is  54%. There are no regional wall motion abnormalities.     2. The RV is mildly dilated in cavity size. The global systolic function is normal. The RVEF is 49%.      3. Both atria are severely enlarged.     4. There is at least moderate, possibly severe tricuspid regurgitation.      5. Late gadolinium enhancement imaging shows hyperenhancement in the RV insertion site consistent with RV  pressure/volume overload.      6. There is a loculated pericardial effusion along the basal lateral and basal to mid inferior LV walls,  and along the inferior RV wall. it appears exudative in nature, and is beginning to organize. There is  diffuse pericardial enhancement.     7. There is no intracardiac thrombus or mass.     8. There is a small right pleural effusion.    Assessment and Plan:   Ms. Michel is a 56 year old female who has a past medical history significant for VSD s/p repair 1969, RA, HTN, insomnia, atrial tachyarrhythmias, cardiac arrest, and newly diagnosed DLBCL.   She initially presented in 5/2017 with symptoamtic atrial tachyarrhythmias (SVT, AF, AFL). She underwent DCCV with early recurrence and was started on AAT. She was thought to not tolerate Sotalol and was switched to amiodarone. However, she continued to have fatigue, nausea, and general malaise despite being in sinus. She then suffered out of hospital cardiac arrest and was found in VF by EMS and defibrillated in field. She had a prolonged  hospital course c/b anasarca, CELSA, shock liver, fevers, cytopenias and was ultimately found to have DLBCL and started on treatment. She presents today for follow up. She reports feeling well. She continues to have LUE wound and will be meeting with Plastics.   After her Digoxin was reinitiated on her last visit, her symptoms have improved considerably. She underwent a successful DCCV on  and has remained in NSR since, barring possibly one AF episode that terminated spontaneously after lasting for a couple of hours.  Her Digoxin levels have been demonstrated to be therapeutic and she her cardiac MRI shows normal LV function and a loculated pericardial effusion.       Atrial Fibrillation:  We discussed in detail with the patient management/treatment options for LUCIfib includin. Stroke Prophylaxis:  CHADSVASC=+HTN, +gender  2, corresponding to a 2.2% annual stroke / systemic emolism event rate. indicating need for long term oral anticoagulation.  She is now able to be anticoagulated per her Oncology team. We will continue Eliquis 5 mg BID.   2. Rate Control: Continue Atenolol and Digoxin. Her symptoms have improved remarkably on this current regimen. .   3. Rhythm Control: Cardioversion, Antiarrhythmics and/or ablation are options for rhythm control. Since her last DCCV on  she has remained in NSR.   .   Notably, she has possible side effects from Sotalol (however, likely was due to underlying illness at the time and not from medication). Was on amiodarone with continued episodes and stopped. If she continues to have frequent symptomatic episodes of AF/AFl, given her history of adverse reactions to AAT, she may be a candidate for ablation.     4. Risk Factor Management: maintain tight BP control, and PB evaluation as indicated.         She will follow up with heme/onc later this month to assess for any recurrence.  She will follow up with Dr. Eaton in three months.     The patient states understanding and  is agreeable with plan.   This patient was seen and evaluated with Dr. Eaton. The above note reflects our joint assessment and plan.     [place] MD  Cardiovascular Disease Fellow  Pager: [phone]    EP STAFF NOTE  Patient seen and examined and management plan personally reviewed with the patient. I agree with the note above by the CV/EP fellow.  Juan Eaton MD [facility]  Cardiology - Electrophysiology         Isotretinoin Counseling: Patient should get monthly blood tests, not donate blood, not drive at night if vision affected, not share medication, and not undergo elective surgery for 6 months after tx completed. Side effects reviewed, pt to contact office should one occur.

## 2024-12-03 NOTE — PROCEDURES
Arterial Line Insertion - Procedure Note    Procedure: Insertion of an arterial catheter in the Right radial artery using ultrasound guidance.     Indications: arterial blood pressure monitoring    Procedure:   Informed consent was obtained for the procedure, including sedation and local anesthetic.  Risks of hematoma, ischemia, hemorrhage, need for additional surgery and/or adverse drug reaction were discussed.     The Right radial artery was prepped, draped and accessed with a micropuncture needle using ultrasound guidance, good blood return was identified and a guide wire advanced without obvious resistance. The arterial catheter was then advanced over the wire without difficulty, and the wire removed. The line was then attached to the transducer, showing a good waveform, easy flushing, and good blood return. The catheter was sutured in place, and a biopatch and a sterile dressing were applied.            Complications: none       Spoke with daughter, Shannon, and patient. Informed the of message from [place]. Daughter and patient verbalized understanding. Per daughter they are out of town until Monday but she can be contacted and we can leave a voicemail if we need anything for [place] records.

## 2024-12-11 ENCOUNTER — ONCOLOGY VISIT (OUTPATIENT)
Dept: TRANSPLANT | Facility: CLINIC | Age: 64
End: 2024-12-11
Attending: INTERNAL MEDICINE
Payer: COMMERCIAL

## 2024-12-11 VITALS
RESPIRATION RATE: 16 BRPM | OXYGEN SATURATION: 93 % | WEIGHT: 126.9 LBS | HEART RATE: 85 BPM | BODY MASS INDEX: 27.46 KG/M2 | TEMPERATURE: 97.6 F | SYSTOLIC BLOOD PRESSURE: 120 MMHG | DIASTOLIC BLOOD PRESSURE: 74 MMHG

## 2024-12-11 DIAGNOSIS — C83.30 DIFFUSE LARGE B-CELL LYMPHOMA, UNSPECIFIED BODY REGION (H): ICD-10-CM

## 2024-12-11 LAB
ALBUMIN SERPL BCG-MCNC: 4.6 G/DL (ref 3.5–5.2)
ALP SERPL-CCNC: 81 U/L (ref 40–150)
ALT SERPL W P-5'-P-CCNC: 20 U/L (ref 0–70)
ANION GAP SERPL CALCULATED.3IONS-SCNC: 13 MMOL/L (ref 7–15)
AST SERPL W P-5'-P-CCNC: 34 U/L (ref 0–45)
BASOPHILS # BLD AUTO: 0 10E3/UL (ref 0–0.2)
BASOPHILS NFR BLD AUTO: 1 %
BILIRUB SERPL-MCNC: 0.6 MG/DL
BUN SERPL-MCNC: 34.6 MG/DL (ref 8–23)
CALCIUM SERPL-MCNC: 9.5 MG/DL (ref 8.8–10.4)
CHLORIDE SERPL-SCNC: 98 MMOL/L (ref 98–107)
CREAT SERPL-MCNC: 1.27 MG/DL (ref 0.51–1.17)
EGFRCR SERPLBLD CKD-EPI 2021: 47 ML/MIN/1.73M2
EOSINOPHIL # BLD AUTO: 0 10E3/UL (ref 0–0.7)
EOSINOPHIL NFR BLD AUTO: 1 %
ERYTHROCYTE [DISTWIDTH] IN BLOOD BY AUTOMATED COUNT: 11.9 % (ref 10–15)
GLUCOSE SERPL-MCNC: 161 MG/DL (ref 70–99)
HCO3 SERPL-SCNC: 25 MMOL/L (ref 22–29)
HCT VFR BLD AUTO: 45.1 % (ref 35–53)
HGB BLD-MCNC: 15.7 G/DL (ref 11.7–17.7)
IMM GRANULOCYTES # BLD: 0 10E3/UL
IMM GRANULOCYTES NFR BLD: 0 %
LDH SERPL L TO P-CCNC: 299 U/L (ref 0–250)
LYMPHOCYTES # BLD AUTO: 0.7 10E3/UL (ref 0.8–5.3)
LYMPHOCYTES NFR BLD AUTO: 13 %
MCH RBC QN AUTO: 35.2 PG (ref 26.5–33)
MCHC RBC AUTO-ENTMCNC: 34.8 G/DL (ref 31.5–36.5)
MCV RBC AUTO: 101 FL (ref 78–100)
MONOCYTES # BLD AUTO: 0.5 10E3/UL (ref 0–1.3)
MONOCYTES NFR BLD AUTO: 9 %
NEUTROPHILS # BLD AUTO: 4.1 10E3/UL (ref 1.6–8.3)
NEUTROPHILS NFR BLD AUTO: 77 %
NRBC # BLD AUTO: 0 10E3/UL
NRBC BLD AUTO-RTO: 0 /100
PLATELET # BLD AUTO: 110 10E3/UL (ref 150–450)
POTASSIUM SERPL-SCNC: 4.7 MMOL/L (ref 3.4–5.3)
PROT SERPL-MCNC: 7.3 G/DL (ref 6.4–8.3)
RBC # BLD AUTO: 4.46 10E6/UL (ref 3.8–5.9)
SODIUM SERPL-SCNC: 136 MMOL/L (ref 135–145)
WBC # BLD AUTO: 5.4 10E3/UL (ref 4–11)

## 2024-12-11 PROCEDURE — 82040 ASSAY OF SERUM ALBUMIN: CPT | Performed by: INTERNAL MEDICINE

## 2024-12-11 PROCEDURE — 36415 COLL VENOUS BLD VENIPUNCTURE: CPT | Performed by: INTERNAL MEDICINE

## 2024-12-11 PROCEDURE — 99213 OFFICE O/P EST LOW 20 MIN: CPT | Performed by: INTERNAL MEDICINE

## 2024-12-11 PROCEDURE — 82310 ASSAY OF CALCIUM: CPT | Performed by: INTERNAL MEDICINE

## 2024-12-11 PROCEDURE — 83615 LACTATE (LD) (LDH) ENZYME: CPT | Performed by: INTERNAL MEDICINE

## 2024-12-11 PROCEDURE — 85025 COMPLETE CBC W/AUTO DIFF WBC: CPT | Performed by: INTERNAL MEDICINE

## 2024-12-11 PROCEDURE — 85014 HEMATOCRIT: CPT | Performed by: INTERNAL MEDICINE

## 2024-12-11 PROCEDURE — 99203 OFFICE O/P NEW LOW 30 MIN: CPT | Performed by: INTERNAL MEDICINE

## 2024-12-11 ASSESSMENT — PAIN SCALES - GENERAL: PAINLEVEL_OUTOF10: MILD PAIN (3)

## 2024-12-11 NOTE — NURSING NOTE
"Oncology Rooming Note    December 11, 2024 2:46 PM   Amelia Michel is a 64 year old adult who presents for:    Chief Complaint   Patient presents with    Blood Draw     Labs drawn with  by rn.  VS taken.    Oncology Clinic Visit     Diffuse large B-cell lymphoma     Initial Vitals: /74 (BP Location: Right arm, Patient Position: Sitting, Cuff Size: Adult Regular)   Pulse 85   Temp 97.6  F (36.4  C) (Oral)   Resp 16   Wt 57.6 kg (126 lb 14.4 oz)   SpO2 93%   BMI 27.46 kg/m   Estimated body mass index is 27.46 kg/m  as calculated from the following:    Height as of 10/23/24: 1.448 m (4' 9\").    Weight as of this encounter: 57.6 kg (126 lb 14.4 oz). Body surface area is 1.52 meters squared.  Mild Pain (3) Comment: Data Unavailable   No LMP recorded. Patient is postmenopausal.  Allergies reviewed: Yes  Medications reviewed: Yes    Medications: Medication refills not needed today.  Pharmacy name entered into Rol[facility]:    [id number] - [place], [place] - [address] AT [facility] [address]  [facility] - [place], [place] - [address] [facility] - [place], [place] - [address] [place] [address]    Frailty Screening:   Is the patient here for a new oncology consult visit in cancer care? 2. No      Clinical concerns: Patient states no new concerns to discuss with provider.        Paty Michele, EMT            "

## 2024-12-11 NOTE — NURSING NOTE
Chief Complaint   Patient presents with    Blood Draw     Labs drawn with  by rn.  VS taken.     Labs drawn with  by rn.  Pt tolerated well.  VS taken.  Pt checked in for next appt.    Leanne Lund RN     23-Oct-2019 09:30

## 2024-12-11 NOTE — LETTER
12/11/2024      Amelia Michel  [address]  [place] [address]      Dear Colleague,    Thank you for referring your patient, Amelia Michel, to the [facility] [place]. Please see a copy of my visit note below.    Hematology/ Oncology Follow Up Visit  March 16, 2021     Disease and Treatment History:  1. Complicated patient with history of Rheumatoid Arthritis status post long term treatment with methotrexate with recent significant complicated course with cardiac arrest, admission with hypotension, sepsis, ICU stay and intubation with subsequent additional readmission from [facility] 6[facility]2017 for FUO with extensive work-up with eventual finding of progression cytopenias with eventual lymphoma diagnosis  2. Bone Marrow Biopsy: 7/12/2017: Highly suspicious for involvement by B cell lymphoma with foci of large atypical CD20+ cells  3. PET CT 7/19/2017: Extensive activity: Right paratracheal lesion with SUV 28, many liver lesions with SUV 15, cardiac lesion intraarterial septum, numerous bone lesions.  4. 7/21 Liver Biopsy: Consistent with Diffuse Large B Cell Lymphoma non-germinal center phenotype  -- FISH for myc, bcl2, bcl6 negative for double-hit lymphoma  5. IPI based on Age < 60 (0 points) + PS 2 (1 + point) + Stage IV Disease (1 point) + Extranodal disease > 1 site (1 point) + elevated LDH (1 point) = 4 Poor Risk Disease  6. Cycle 1 given as R-EPOCH Day 1 = 7/27/2017  -- complications of transfusion dependence and need for acute rehab placement (likely more result of extensive hospital stays)  - LP negative for CNS involvement 8/23/2017  7. Cycle #2: Transition to R-CHOP Day 1 = 8/22/2017  - Day 15 high dose MTX for CNS prophylaxis  - complicated by significant fluid overload and PICC associated DVT  8. Cycle #3 Day 1 = 9/20/2017 Post Cycle 3 PET CR. Cycle 4 10/11/2017 + IT prophylaxis, Cycle 5 11/8/17, Cycle 6 11/29/2017 + IT prophylaxis  -- Post Treatment  completion PET 1/15/2018: Complete Remission        HPI:    Feels well.  No complaints.  10 point ROS otherwise negative        Physical Exam:  /74 (BP Location: Right arm, Patient Position: Sitting, Cuff Size: Adult Regular)   Pulse 85   Temp 97.6  F (36.4  C) (Oral)   Resp 16   Wt 57.6 kg (126 lb 14.4 oz)   SpO2 93%   BMI 27.46 kg/m       Gen: Alert, in NAD  Eyes: EOMI, sclera anicteric, PERRL  HENT      Head: NC/AT     Ears: No external auricular lesions     Nose/sinus: No rhinorrhea or epistaxis     Oral Cavity/Oropharynx: MMM, no thrush noted  Neck:  No LAD   Pulm: Breathing comfortably on room air, no audible wheezes or ronchi  CV: Well-perfused, no cyanosis  Abdominal: Soft, nondistended  Skin: Normal color and turgor  Psych: Appropriate mood and affect  Ext: HARRY stockings  Lymph node survey: No palpable cervical, axillary, or inguinal.  No splenomegaly.     I reviewed her labs.     Assessment/Plan:   A/P: 61 yo woman with history of rhematoid arthritis and history of stage 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  She is currently on CR.  No signs of disease recurrence or progression.     1. DLBCL: s/p 1 REPOCH and 5 RCHOP. Now in complete remission. No evidence of relapse by history or physical exam.  We will discharge her from clinic.  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  We discussed the signs and symptoms of relapse and how to call us to get in.     2. Rheumatoid arthritis: She follows with rheumatology.  She had some questions regarding Plaquenil and rituximab.  I recommended discussing this more thoroughly with the rheumatologist who knows her symptomatology better.  However if rituximab is an option, I would favor that given that we know that immunosuppression is a risk factor for lymphoma recurrence.     3. CV: A. Fib. Post pacemaker placement.  Follows with cardiology.  Is on optimal medical therapy.  Has been trying to lose  weight and increase exercise.  Has been through cardiac rehab.  I renewed her atenolol today per her request.  She also wanted to check her digoxin level per her request.     4. Neuropathy: on gabapentin. Stable. No worsening but no significant improvement either      40 minutes spent on the date of the encounter doing chart review, interpretation of results, patient visit, documentation and coordination of care.    Follow up prn.      Again, thank you for allowing me to participate in the care of your patient.        Sincerely,        Thai Heredia MD

## 2024-12-12 ENCOUNTER — HOSPITAL ENCOUNTER (OUTPATIENT)
Dept: MAMMOGRAPHY | Facility: CLINIC | Age: 64
Discharge: HOME | End: 2024-12-12
Attending: PHYSICIAN ASSISTANT
Payer: COMMERCIAL

## 2024-12-12 DIAGNOSIS — Z12.31 VISIT FOR SCREENING MAMMOGRAM: ICD-10-CM

## 2024-12-12 PROCEDURE — 77063 BREAST TOMOSYNTHESIS BI: CPT

## 2024-12-12 NOTE — PROGRESS NOTES
Hematology/ Oncology Follow Up Visit  March 16, 2021     Disease and Treatment History:  1. Complicated patient with history of Rheumatoid Arthritis status post long term treatment with methotrexate with recent significant complicated course with cardiac arrest, admission with hypotension, sepsis, ICU stay and intubation with subsequent additional readmission from TCU in 6/2017 for FUO with extensive work-up with eventual finding of progression cytopenias with eventual lymphoma diagnosis  2. Bone Marrow Biopsy: 7/12/2017: Highly suspicious for involvement by B cell lymphoma with foci of large atypical CD20+ cells  3. PET CT 7/19/2017: Extensive activity: Right paratracheal lesion with SUV 28, many liver lesions with SUV 15, cardiac lesion intraarterial septum, numerous bone lesions.  4. 7/21 Liver Biopsy: Consistent with Diffuse Large B Cell Lymphoma non-germinal center phenotype  -- FISH for myc, bcl2, bcl6 negative for double-hit lymphoma  5. IPI based on Age < 60 (0 points) + PS 2 (1 + point) + Stage IV Disease (1 point) + Extranodal disease > 1 site (1 point) + elevated LDH (1 point) = 4 Poor Risk Disease  6. Cycle 1 given as R-EPOCH Day 1 = 7/27/2017  -- complications of transfusion dependence and need for acute rehab placement (likely more result of extensive hospital stays)  - LP negative for CNS involvement 8/23/2017  7. Cycle #2: Transition to R-CHOP Day 1 = 8/22/2017  - Day 15 high dose MTX for CNS prophylaxis  - complicated by significant fluid overload and PICC associated DVT  8. Cycle #3 Day 1 = 9/20/2017 Post Cycle 3 PET CR. Cycle 4 10/11/2017 + IT prophylaxis, Cycle 5 11/8/17, Cycle 6 11/29/2017 + IT prophylaxis  -- Post Treatment completion PET 1/15/2018: Complete Remission        HPI:    Feels well.  No complaints.  10 point ROS otherwise negative        Physical Exam:  /74 (BP Location: Right arm, Patient Position: Sitting, Cuff Size: Adult Regular)   Pulse 85   Temp 97.6  F (36.4  C)  (Oral)   Resp 16   Wt 57.6 kg (126 lb 14.4 oz)   SpO2 93%   BMI 27.46 kg/m       Gen: Alert, in NAD  Eyes: EOMI, sclera anicteric, PERRL  HENT      Head: NC/AT     Ears: No external auricular lesions     Nose/sinus: No rhinorrhea or epistaxis     Oral Cavity/Oropharynx: MMM, no thrush noted  Neck:  No LAD   Pulm: Breathing comfortably on room air, no audible wheezes or ronchi  CV: Well-perfused, no cyanosis  Abdominal: Soft, nondistended  Skin: Normal color and turgor  Psych: Appropriate mood and affect  Ext: HARRY stockings  Lymph node survey: No palpable cervical, axillary, or inguinal.  No splenomegaly.     I reviewed her labs.     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  She is currently on CR.  No signs of disease recurrence or progression.     1. DLBCL: s/p 1 REPOCH and 5 RCHOP. Now in complete remission. No evidence of relapse by history or physical exam.  We will discharge her from clinic.  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  We discussed the signs and symptoms of relapse and how to call us to get in.     2. Rheumatoid arthritis: She follows with rheumatology.  She had some questions regarding Plaquenil and rituximab.  I recommended discussing this more thoroughly with the rheumatologist who knows her symptomatology better.  However if rituximab is an option, I would favor that given that we know that immunosuppression is a risk factor for lymphoma recurrence.     3. CV: A. Fib. Post pacemaker placement.  Follows with cardiology.  Is on optimal medical therapy.  Has been trying to lose weight and increase exercise.  Has been through cardiac rehab.  I renewed her atenolol today per her request.  She also wanted to check her digoxin level per her request.     4. Neuropathy: on gabapentin. Stable. No worsening but no significant improvement either      40  minutes spent on the date of the encounter doing chart review, interpretation of results, patient visit, documentation and coordination of care.    Follow up prn.

## 2024-12-17 ENCOUNTER — PATIENT OUTREACH (OUTPATIENT)
Dept: ONCOLOGY | Facility: CLINIC | Age: 64
End: 2024-12-17
Payer: COMMERCIAL

## 2024-12-17 NOTE — PROGRESS NOTES
[facility]: Cancer Care Short Note                                                                                          Chart review conducted. Pt's [employer] enrollment reviewed and updated.  Pt to follow up on prn basis.    Samia Calhoun, RN, MSN, OCN   RN Care Coordinator   [facility]   [address] [place] [id number] [phone]

## 2024-12-27 PROBLEM — I50.33 ACUTE ON CHRONIC DIASTOLIC HEART FAILURE (H): Status: RESOLVED | Noted: 2022-11-19 | Resolved: 2024-12-27

## 2024-12-27 PROBLEM — R00.2 HEART PALPITATIONS: Status: RESOLVED | Noted: 2019-12-12 | Resolved: 2024-12-27

## 2024-12-27 PROBLEM — E87.70 HYPERVOLEMIA, UNSPECIFIED HYPERVOLEMIA TYPE: Status: RESOLVED | Noted: 2023-01-24 | Resolved: 2024-12-27

## 2024-12-27 PROBLEM — C83.30 DIFFUSE LARGE B CELL LYMPHOMA (H): Chronic | Status: RESOLVED | Noted: 2017-08-04 | Resolved: 2024-12-27

## 2024-12-27 PROBLEM — N18.32 STAGE 3B CHRONIC KIDNEY DISEASE (H): Status: ACTIVE | Noted: 2021-10-16

## 2024-12-27 PROBLEM — R50.81 FEBRILE NEUTROPENIA: Status: RESOLVED | Noted: 2017-08-08 | Resolved: 2024-12-27

## 2024-12-27 PROBLEM — I50.20 HEART FAILURE WITH REDUCED EJECTION FRACTION, NYHA CLASS II (H): Status: ACTIVE | Noted: 2023-01-24

## 2024-12-27 PROBLEM — D70.9 FEBRILE NEUTROPENIA: Status: RESOLVED | Noted: 2017-08-08 | Resolved: 2024-12-27

## 2024-12-27 PROBLEM — R60.9 EDEMA, UNSPECIFIED TYPE: Status: RESOLVED | Noted: 2023-01-24 | Resolved: 2024-12-27

## 2024-12-27 PROBLEM — I50.9 CHF (CONGESTIVE HEART FAILURE) (H): Status: RESOLVED | Noted: 2018-03-15 | Resolved: 2024-12-27

## 2024-12-27 PROBLEM — R00.1 BRADYCARDIA: Status: RESOLVED | Noted: 2019-12-12 | Resolved: 2024-12-27

## 2025-01-03 PROBLEM — Z12.4 CERVICAL CANCER SCREENING: Status: ACTIVE | Noted: 2018-10-18

## 2025-01-13 ENCOUNTER — TRANSFERRED RECORDS (OUTPATIENT)
Dept: HEALTH INFORMATION MANAGEMENT | Facility: CLINIC | Age: 65
End: 2025-01-13
Payer: COMMERCIAL

## 2025-01-16 ENCOUNTER — TELEPHONE (OUTPATIENT)
Dept: PHARMACY | Facility: CLINIC | Age: 65
End: 2025-01-16
Payer: COMMERCIAL

## 2025-01-16 NOTE — TELEPHONE ENCOUNTER
Pt is due for follow up with Archana CELIS     Call placed to pt to initiate scheduling on 1/16/25    Outcome: LVM. Due to My[facility] inactivity no message was sent. Will request a letter to schedule follow up be sent out.     *Next follow up outreach attempt will be on/around February 16th as second attempt*

## 2025-01-21 ENCOUNTER — TRANSFERRED RECORDS (OUTPATIENT)
Dept: HEALTH INFORMATION MANAGEMENT | Facility: CLINIC | Age: 65
End: 2025-01-21
Payer: COMMERCIAL

## 2025-01-30 ENCOUNTER — ANCILLARY PROCEDURE (OUTPATIENT)
Dept: CARDIOLOGY | Facility: CLINIC | Age: 65
End: 2025-01-30
Attending: INTERNAL MEDICINE
Payer: COMMERCIAL

## 2025-01-30 DIAGNOSIS — I47.19 ATRIAL TACHYCARDIA: ICD-10-CM

## 2025-01-30 LAB
MDC_IDC_EPISODE_DTM: NORMAL
MDC_IDC_EPISODE_DURATION: 0 S
MDC_IDC_EPISODE_DURATION: 0 S
MDC_IDC_EPISODE_DURATION: 1 S
MDC_IDC_EPISODE_DURATION: 1005 S
MDC_IDC_EPISODE_DURATION: 1021 S
MDC_IDC_EPISODE_DURATION: 1118 S
MDC_IDC_EPISODE_DURATION: 1257 S
MDC_IDC_EPISODE_DURATION: 135 S
MDC_IDC_EPISODE_DURATION: 1409 S
MDC_IDC_EPISODE_DURATION: 1603 S
MDC_IDC_EPISODE_DURATION: 1702 S
MDC_IDC_EPISODE_DURATION: 1738 S
MDC_IDC_EPISODE_DURATION: 1779 S
MDC_IDC_EPISODE_DURATION: 181 S
MDC_IDC_EPISODE_DURATION: 182 S
MDC_IDC_EPISODE_DURATION: 182 S
MDC_IDC_EPISODE_DURATION: 183 S
MDC_IDC_EPISODE_DURATION: 184 S
MDC_IDC_EPISODE_DURATION: 2 S
MDC_IDC_EPISODE_DURATION: 2 S
MDC_IDC_EPISODE_DURATION: 21 S
MDC_IDC_EPISODE_DURATION: 222 S
MDC_IDC_EPISODE_DURATION: 2547 S
MDC_IDC_EPISODE_DURATION: 2762 S
MDC_IDC_EPISODE_DURATION: 2837 S
MDC_IDC_EPISODE_DURATION: 284 S
MDC_IDC_EPISODE_DURATION: 288 S
MDC_IDC_EPISODE_DURATION: 2913 S
MDC_IDC_EPISODE_DURATION: 3 S
MDC_IDC_EPISODE_DURATION: 377 S
MDC_IDC_EPISODE_DURATION: 4 S
MDC_IDC_EPISODE_DURATION: 4015 S
MDC_IDC_EPISODE_DURATION: 44 S
MDC_IDC_EPISODE_DURATION: 491 S
MDC_IDC_EPISODE_DURATION: 6 S
MDC_IDC_EPISODE_DURATION: 629 S
MDC_IDC_EPISODE_DURATION: 648 S
MDC_IDC_EPISODE_DURATION: 67 S
MDC_IDC_EPISODE_DURATION: 69 S
MDC_IDC_EPISODE_DURATION: 7028 S
MDC_IDC_EPISODE_DURATION: 765 S
MDC_IDC_EPISODE_DURATION: 766 S
MDC_IDC_EPISODE_DURATION: 838 S
MDC_IDC_EPISODE_DURATION: 849 S
MDC_IDC_EPISODE_DURATION: 875 S
MDC_IDC_EPISODE_DURATION: 89 S
MDC_IDC_EPISODE_DURATION: 89 S
MDC_IDC_EPISODE_DURATION: 9132 S
MDC_IDC_EPISODE_DURATION: 921 S
MDC_IDC_EPISODE_DURATION: NORMAL S
MDC_IDC_EPISODE_ID: NORMAL
MDC_IDC_EPISODE_TYPE: NORMAL
MDC_IDC_LEAD_CONNECTION_STATUS: NORMAL
MDC_IDC_LEAD_CONNECTION_STATUS: NORMAL
MDC_IDC_LEAD_IMPLANT_DT: NORMAL
MDC_IDC_LEAD_IMPLANT_DT: NORMAL
MDC_IDC_LEAD_LOCATION: NORMAL
MDC_IDC_LEAD_LOCATION: NORMAL
MDC_IDC_LEAD_LOCATION_DETAIL_1: NORMAL
MDC_IDC_LEAD_LOCATION_DETAIL_1: NORMAL
MDC_IDC_LEAD_MFG: NORMAL
MDC_IDC_LEAD_MFG: NORMAL
MDC_IDC_LEAD_MODEL: NORMAL
MDC_IDC_LEAD_MODEL: NORMAL
MDC_IDC_LEAD_POLARITY_TYPE: NORMAL
MDC_IDC_LEAD_POLARITY_TYPE: NORMAL
MDC_IDC_LEAD_SERIAL: NORMAL
MDC_IDC_LEAD_SERIAL: NORMAL
MDC_IDC_LEAD_SPECIAL_FUNCTION: NORMAL
MDC_IDC_LEAD_SPECIAL_FUNCTION: NORMAL
MDC_IDC_MSMT_BATTERY_DTM: NORMAL
MDC_IDC_MSMT_BATTERY_REMAINING_LONGEVITY: 96 MO
MDC_IDC_MSMT_BATTERY_RRT_TRIGGER: 2.62
MDC_IDC_MSMT_BATTERY_STATUS: NORMAL
MDC_IDC_MSMT_BATTERY_VOLTAGE: 2.98 V
MDC_IDC_MSMT_LEADCHNL_RA_IMPEDANCE_VALUE: 285 OHM
MDC_IDC_MSMT_LEADCHNL_RA_IMPEDANCE_VALUE: 399 OHM
MDC_IDC_MSMT_LEADCHNL_RA_PACING_THRESHOLD_AMPLITUDE: 0.5 V
MDC_IDC_MSMT_LEADCHNL_RA_PACING_THRESHOLD_PULSEWIDTH: 0.4 MS
MDC_IDC_MSMT_LEADCHNL_RA_SENSING_INTR_AMPL: 2.25 MV
MDC_IDC_MSMT_LEADCHNL_RA_SENSING_INTR_AMPL: 2.25 MV
MDC_IDC_MSMT_LEADCHNL_RV_IMPEDANCE_VALUE: 418 OHM
MDC_IDC_MSMT_LEADCHNL_RV_IMPEDANCE_VALUE: 494 OHM
MDC_IDC_MSMT_LEADCHNL_RV_PACING_THRESHOLD_AMPLITUDE: 0.62 V
MDC_IDC_MSMT_LEADCHNL_RV_PACING_THRESHOLD_PULSEWIDTH: 0.4 MS
MDC_IDC_MSMT_LEADCHNL_RV_SENSING_INTR_AMPL: 18.25 MV
MDC_IDC_MSMT_LEADCHNL_RV_SENSING_INTR_AMPL: 18.25 MV
MDC_IDC_PG_IMPLANT_DTM: NORMAL
MDC_IDC_PG_MFG: NORMAL
MDC_IDC_PG_MODEL: NORMAL
MDC_IDC_PG_SERIAL: NORMAL
MDC_IDC_PG_TYPE: NORMAL
MDC_IDC_SESS_CLINIC_NAME: NORMAL
MDC_IDC_SESS_DTM: NORMAL
MDC_IDC_SESS_TYPE: NORMAL
MDC_IDC_SET_BRADY_AT_MODE_SWITCH_RATE: 171 {BEATS}/MIN
MDC_IDC_SET_BRADY_HYSTRATE: NORMAL
MDC_IDC_SET_BRADY_LOWRATE: 75 {BEATS}/MIN
MDC_IDC_SET_BRADY_MAX_SENSOR_RATE: 130 {BEATS}/MIN
MDC_IDC_SET_BRADY_MAX_TRACKING_RATE: 130 {BEATS}/MIN
MDC_IDC_SET_BRADY_MODE: NORMAL
MDC_IDC_SET_BRADY_PAV_DELAY_LOW: 180 MS
MDC_IDC_SET_BRADY_SAV_DELAY_LOW: 150 MS
MDC_IDC_SET_LEADCHNL_RA_PACING_AMPLITUDE: 1.5 V
MDC_IDC_SET_LEADCHNL_RA_PACING_ANODE_ELECTRODE_1: NORMAL
MDC_IDC_SET_LEADCHNL_RA_PACING_ANODE_LOCATION_1: NORMAL
MDC_IDC_SET_LEADCHNL_RA_PACING_CAPTURE_MODE: NORMAL
MDC_IDC_SET_LEADCHNL_RA_PACING_CATHODE_ELECTRODE_1: NORMAL
MDC_IDC_SET_LEADCHNL_RA_PACING_CATHODE_LOCATION_1: NORMAL
MDC_IDC_SET_LEADCHNL_RA_PACING_POLARITY: NORMAL
MDC_IDC_SET_LEADCHNL_RA_PACING_PULSEWIDTH: 0.4 MS
MDC_IDC_SET_LEADCHNL_RA_SENSING_ANODE_ELECTRODE_1: NORMAL
MDC_IDC_SET_LEADCHNL_RA_SENSING_ANODE_LOCATION_1: NORMAL
MDC_IDC_SET_LEADCHNL_RA_SENSING_CATHODE_ELECTRODE_1: NORMAL
MDC_IDC_SET_LEADCHNL_RA_SENSING_CATHODE_LOCATION_1: NORMAL
MDC_IDC_SET_LEADCHNL_RA_SENSING_POLARITY: NORMAL
MDC_IDC_SET_LEADCHNL_RA_SENSING_SENSITIVITY: 0.3 MV
MDC_IDC_SET_LEADCHNL_RV_PACING_AMPLITUDE: 2 V
MDC_IDC_SET_LEADCHNL_RV_PACING_ANODE_ELECTRODE_1: NORMAL
MDC_IDC_SET_LEADCHNL_RV_PACING_ANODE_LOCATION_1: NORMAL
MDC_IDC_SET_LEADCHNL_RV_PACING_CAPTURE_MODE: NORMAL
MDC_IDC_SET_LEADCHNL_RV_PACING_CATHODE_ELECTRODE_1: NORMAL
MDC_IDC_SET_LEADCHNL_RV_PACING_CATHODE_LOCATION_1: NORMAL
MDC_IDC_SET_LEADCHNL_RV_PACING_POLARITY: NORMAL
MDC_IDC_SET_LEADCHNL_RV_PACING_PULSEWIDTH: 0.4 MS
MDC_IDC_SET_LEADCHNL_RV_SENSING_ANODE_ELECTRODE_1: NORMAL
MDC_IDC_SET_LEADCHNL_RV_SENSING_ANODE_LOCATION_1: NORMAL
MDC_IDC_SET_LEADCHNL_RV_SENSING_CATHODE_ELECTRODE_1: NORMAL
MDC_IDC_SET_LEADCHNL_RV_SENSING_CATHODE_LOCATION_1: NORMAL
MDC_IDC_SET_LEADCHNL_RV_SENSING_POLARITY: NORMAL
MDC_IDC_SET_LEADCHNL_RV_SENSING_SENSITIVITY: 0.9 MV
MDC_IDC_SET_ZONE_DETECTION_INTERVAL: 350 MS
MDC_IDC_SET_ZONE_DETECTION_INTERVAL: 400 MS
MDC_IDC_SET_ZONE_STATUS: NORMAL
MDC_IDC_SET_ZONE_STATUS: NORMAL
MDC_IDC_SET_ZONE_TYPE: NORMAL
MDC_IDC_SET_ZONE_VENDOR_TYPE: NORMAL
MDC_IDC_STAT_AT_BURDEN_PERCENT: 26.8 %
MDC_IDC_STAT_AT_DTM_END: NORMAL
MDC_IDC_STAT_AT_DTM_START: NORMAL
MDC_IDC_STAT_BRADY_AP_VP_PERCENT: 95.39 %
MDC_IDC_STAT_BRADY_AP_VS_PERCENT: 0.52 %
MDC_IDC_STAT_BRADY_AS_VP_PERCENT: 3.45 %
MDC_IDC_STAT_BRADY_AS_VS_PERCENT: 0.64 %
MDC_IDC_STAT_BRADY_DTM_END: NORMAL
MDC_IDC_STAT_BRADY_DTM_START: NORMAL
MDC_IDC_STAT_BRADY_RA_PERCENT_PACED: 71.73 %
MDC_IDC_STAT_BRADY_RV_PERCENT_PACED: 98.34 %
MDC_IDC_STAT_EPISODE_RECENT_COUNT: 0
MDC_IDC_STAT_EPISODE_RECENT_COUNT: 0
MDC_IDC_STAT_EPISODE_RECENT_COUNT: 1
MDC_IDC_STAT_EPISODE_RECENT_COUNT: 10
MDC_IDC_STAT_EPISODE_RECENT_COUNT: 92
MDC_IDC_STAT_EPISODE_RECENT_COUNT_DTM_END: NORMAL
MDC_IDC_STAT_EPISODE_RECENT_COUNT_DTM_START: NORMAL
MDC_IDC_STAT_EPISODE_TOTAL_COUNT: 0
MDC_IDC_STAT_EPISODE_TOTAL_COUNT: 403
MDC_IDC_STAT_EPISODE_TOTAL_COUNT: 5595
MDC_IDC_STAT_EPISODE_TOTAL_COUNT: 815
MDC_IDC_STAT_EPISODE_TOTAL_COUNT: NORMAL
MDC_IDC_STAT_EPISODE_TOTAL_COUNT_DTM_END: NORMAL
MDC_IDC_STAT_EPISODE_TOTAL_COUNT_DTM_START: NORMAL
MDC_IDC_STAT_EPISODE_TYPE: NORMAL
MDC_IDC_STAT_TACHYTHERAPY_RECENT_DTM_END: NORMAL
MDC_IDC_STAT_TACHYTHERAPY_RECENT_DTM_START: NORMAL
MDC_IDC_STAT_TACHYTHERAPY_TOTAL_DTM_END: NORMAL
MDC_IDC_STAT_TACHYTHERAPY_TOTAL_DTM_START: NORMAL

## 2025-01-30 PROCEDURE — 99207 CARDIAC DEVICE CHECK - REMOTE: CPT | Performed by: INTERNAL MEDICINE

## 2025-02-03 ENCOUNTER — VIRTUAL VISIT (OUTPATIENT)
Dept: PHARMACY | Facility: CLINIC | Age: 65
End: 2025-02-03
Attending: INTERNAL MEDICINE
Payer: COMMERCIAL

## 2025-02-03 DIAGNOSIS — M06.9 RHEUMATOID ARTHRITIS INVOLVING BOTH HANDS, UNSPECIFIED WHETHER RHEUMATOID FACTOR PRESENT (H): Primary | Chronic | ICD-10-CM

## 2025-02-03 DIAGNOSIS — G62.9 NEUROPATHY: ICD-10-CM

## 2025-02-03 RX ORDER — ABATACEPT 125 MG/ML
125 INJECTION, SOLUTION SUBCUTANEOUS WEEKLY
Qty: 4 ML | Refills: 5 | Status: SHIPPED | OUTPATIENT
Start: 2025-02-03

## 2025-02-03 NOTE — PROGRESS NOTES
Medication Therapy Management (MTM) Encounter    ASSESSMENT:                            Medication Adherence/Access: No issues identified.    Rheumatoid Arthritis/Neuropathy: Patient finding good efficacy to Orencia however speed of autoinjector likely contributing to significant bruising with administration. Will switch to pre-filled so patient can control speed of administration and hopefully reduce bruising. Would benefit from continuing 125 mg weekly. Will send in lab orders to be completed at 4/2/25 draw.     PLAN:                            1. I sent new orders for Orencia pre-filled syringes that you can get instead of auto-injectors for your next refill. Continue 125 mg (1 injection) weekly.    2. I placed lab orders to be completed on 4/2/25 with other bloodwork.    Follow-up: Return in about 6 months (around 8/3/2025) for MTM Pharmacist Visit.    SUBJECTIVE/OBJECTIVE:                          Amelia Michel is a 64 year old adult seen for a follow-up visit.       Reason for visit: Orencia follow up    Allergies/ADRs: Reviewed in chart  Past Medical History: Reviewed in chart  Tobacco: Amelia reports that Amelia has never smoked. Amelia has never used smokeless tobacco.  Alcohol: not currently using    Medication Adherence/Access: no issues reported.    Rheumatoid Arthritis/Neuropathy:   Orencia 125 mg weekly  Hydroxychloroquine 200 mg daily  Prednisone 3 mg daily  Gabapentin 100 mg every morning and afternoon, 200 mg at bedtime  Tylenol 650 mg as needed      Reports she was previously on Actemra however she was not seeing improvement in symptoms so switched to Orencia at Oct rheumatology visit with Dr. Domínguez. Very happy with symptom improvement from Orencia. Her trigger finger and overall joint pain has decreased significantly. Still has some morning stiffness but this resolves most days. Able to clean dishes and mirrors which she couldn't before. Has noticed significant bruising from autoinjectors  (both Actemra and Orencia) - due to aspirin and Eliquis use. Wondering if there is a standard injection option so she can control the speed of injections. Hoping this will reduce the bruising. Would like any needed labs sent in for her April blood draw so she only has to be stuck once. Completing cataract surgery this month 2/10/25 right eye, 2/25/25 left eye. Having a little more dry eye currently but thinks this will improve after surgery.     Liver Function Studies -   Recent Labs   Lab Test 12/11/24  1424   PROTTOTAL 7.3   ALBUMIN 4.6   BILITOTAL 0.6   ALKPHOS 81   AST 34   ALT 20      CBC RESULTS:   Recent Labs   Lab Test 12/11/24  1424   WBC 5.4   RBC 4.46   HGB 15.7   HCT 45.1   *   MCH 35.2*   MCHC 34.8   RDW 11.9   *      Today's Vitals: There were no vitals taken for this visit.  ----------------    I spent 19 minutes with this patient today. All changes were made via collaborative practice agreement with Maribell Domínguez.     A summary of these recommendations was sent via [employer].    Archana Brar, PharmD  Medication Therapy Management Pharmacist  [facility]  Phone: [phone]    Telemedicine Visit Details  The patient's medications can be safely assessed via a telemedicine encounter.  Type of service:  Telephone visit  Originating Location (pt. Location): Home    Distant Location (provider location):  Off-site  Start Time: 1:30 PM  End Time: 1:49 PM     Medication Therapy Recommendations  RA (rheumatoid arthritis) (H)   1 Current Medication: Abatacept (ORENCIA) 125 MG/ML SOAJ auto-injector (Discontinued)   Current Medication Sig: Inject 1 mL (125 mg) subcutaneously every 7 days. Hold for signs of infection, and seek medical attention.   Rationale: Undesirable effect - Adverse medication event - Safety   Recommendation: Change Medication - Orencia 125 MG/ML Sosy - Switch to Orencia prefilled syringes to reduce bruising   Status: Accepted per CPA   Identified  Date: 2/3/2025 Completed Date: 2/3/2025

## 2025-02-03 NOTE — PATIENT INSTRUCTIONS
"Recommendations from today's MTM visit:                                                       1. I sent new orders for Orencia pre-filled syringes that you can get instead of auto-injectors for your next refill. Continue 125 mg (1 injection) weekly.    2. I placed lab orders to be completed on 4/2/25 with other bloodwork.    Follow-up: Return in about 6 months (around 8/3/2025) for MTM Pharmacist Visit.    It was great speaking with you today.  I value your experience and would be very thankful for your time in providing feedback in our clinic survey. In the next few days, you may receive an email or text message from [facility] [employer] with a link to a survey related to your  clinical pharmacist.\"     To schedule another MTM appointment, please call the clinic directly or you may call the MTM scheduling line at [phone].    My Clinical Pharmacist's contact information:                                                      Please feel free to contact me with any questions or concerns you have.      Archana Brar, PharmD  Medication Therapy Management Pharmacist  [facility]  Phone: [phone]     "

## 2025-02-05 ENCOUNTER — OFFICE VISIT (OUTPATIENT)
Dept: FAMILY MEDICINE | Facility: CLINIC | Age: 65
End: 2025-02-05
Payer: COMMERCIAL

## 2025-02-05 VITALS
BODY MASS INDEX: 26.45 KG/M2 | WEIGHT: 122.6 LBS | DIASTOLIC BLOOD PRESSURE: 75 MMHG | HEART RATE: 94 BPM | SYSTOLIC BLOOD PRESSURE: 119 MMHG | RESPIRATION RATE: 16 BRPM | TEMPERATURE: 97.4 F | HEIGHT: 57 IN | OXYGEN SATURATION: 98 %

## 2025-02-05 DIAGNOSIS — H35.712 CENTRAL SEROUS CHORIORETINOPATHY OF LEFT EYE: ICD-10-CM

## 2025-02-05 DIAGNOSIS — C83.33 DIFFUSE LARGE B-CELL LYMPHOMA, INTRA-ABDOMINAL LYMPH NODES (H): ICD-10-CM

## 2025-02-05 DIAGNOSIS — R57.0 CARDIOGENIC SHOCK (H): ICD-10-CM

## 2025-02-05 DIAGNOSIS — M06.9 RHEUMATOID ARTHRITIS INVOLVING BOTH HANDS, UNSPECIFIED WHETHER RHEUMATOID FACTOR PRESENT (H): ICD-10-CM

## 2025-02-05 DIAGNOSIS — Z01.818 PREOP GENERAL PHYSICAL EXAM: Primary | ICD-10-CM

## 2025-02-05 DIAGNOSIS — I50.23 ACUTE ON CHRONIC SYSTOLIC (CONGESTIVE) HEART FAILURE (H): ICD-10-CM

## 2025-02-05 DIAGNOSIS — M06.9 RHEUMATOID ARTHRITIS OF BOTH ANKLES, UNSPECIFIED WHETHER RHEUMATOID FACTOR PRESENT (H): ICD-10-CM

## 2025-02-05 DIAGNOSIS — H25.9 AGE-RELATED CATARACT OF BOTH EYES, UNSPECIFIED AGE-RELATED CATARACT TYPE: ICD-10-CM

## 2025-02-05 DIAGNOSIS — N18.32 STAGE 3B CHRONIC KIDNEY DISEASE (H): ICD-10-CM

## 2025-02-05 DIAGNOSIS — I74.9 EMBOLISM AND THROMBOSIS OF UNSPECIFIED ARTERY (H): ICD-10-CM

## 2025-02-05 LAB
CREAT UR-MCNC: 18.6 MG/DL
MICROALBUMIN UR-MCNC: <12 MG/L
MICROALBUMIN/CREAT UR: NORMAL MG/G{CREAT}

## 2025-02-05 PROCEDURE — 99214 OFFICE O/P EST MOD 30 MIN: CPT | Performed by: PHYSICIAN ASSISTANT

## 2025-02-05 PROCEDURE — 82043 UR ALBUMIN QUANTITATIVE: CPT | Performed by: PHYSICIAN ASSISTANT

## 2025-02-05 PROCEDURE — 82570 ASSAY OF URINE CREATININE: CPT | Performed by: PHYSICIAN ASSISTANT

## 2025-02-05 NOTE — PROGRESS NOTES
Preoperative Evaluation  [facility]  [address] [address]  Phone: [phone]  Primary Provider: Gala Stringer PA-C  Pre-op Performing Provider: Gala Stringer PA-C  Feb 5, 2025 2/4/2025   Surgical Information   What procedure is being done? Bilateral catarectomy   Facility or Hospital where procedure/surgery will be performed: [facility]   Who is doing the procedure / surgery?    Date of surgery / procedure: 2/10/2025 for right eye, Left eye will be done on 2/25/2025   Time of surgery / procedure: 12:45   Where do you plan to recover after surgery? at home with family     Fax number for surgical facility: [phone]    Assessment & Plan     The proposed surgical procedure is considered LOW risk.    Preop general physical exam  Approval given.    Age-related cataract of both eyes, unspecified age-related cataract type  Surgery recommended.    Acute on chronic systolic (congestive) heart failure (H)  Stable, currently asymptomatic.  Patient had recent cardiology follow up 1/31/25, conditions stable.   Cardiologist aware of this low risk procedure and no further changes/concerns.    Diffuse large b-cell lymphoma, intra-abdominal lymph nodes (H)  Stable. Closely monitored by oncology.    Rheumatoid arthritis of both ankles, unspecified whether rheumatoid factor present (H)  Stable, closely monitored by rheumatology.  Switched from Actemra to Orencia in October, continuing on prednisone 3 mg daily and hydroxychloroquine.   - CBC with Platelets & Differential; Future  - Comprehensive metabolic panel; Future  - CRP inflammation; Future  - Erythrocyte sedimentation rate auto; Future    Embolism and thrombosis of unspecified artery (H)  Resolved/stable. DVT from PICC line 2017. Remain on long term anticoagulation.    Cardiogenic shock (H)  Resolved.     Stage 3b chronic kidney disease (H)  Stable, due for urine  albumin.  - Albumin Random Urine Quantitative with Creat Ratio    Central serous chorioretinopathy of left eye  Noted at recent ophthalmology visit. Records scanned in. Likely prednisone related. Will monitor, consider laser treatment in the future.    Rheumatoid arthritis involving both hands, unspecified whether rheumatoid factor present (H)  Stable, closely monitored by rheumatology.  Switched from Actemra to Orencia in October, continuing on prednisone 3 mg daily and hydroxychloroquine.        Implanted Device   - Type of device: pacemaker Patient advised to bring device information on day of surgery.   - Type of device: tricuspid tissue valve Patient advised to bring device information on day of surgery.     Risks and Recommendations  The patient has the following additional risks and recommendations for perioperative complications:     - Recurrent use of steroids  On daily prednisone 3 mg. Does not meet requirement for prophylactic steroid burst. But monitor closely in post op period for signs of adrenal insufficiency   Cardiovascular:   - Patient had recent cardiology follow up 1/31/25, conditions stable.   Cardiologist aware of this low risk procedure and no further changes/concerns.  Anemia/Bleeding/Clotting:    - Significant bleeding history   - History of DVT or PE, consider DVT prevention postoperatively   - thrombocytopenia: stable, with platelets 110  CKD: stable: creatinine 1.27    Preoperative Medication Instructions  Antiplatelet or Anticoagulation Medication Instructions   - aspirin: Bleeding risk is low for this procedure and daily aspirin may be continued without modification.    - apixaban (Eliquis), edoxaban (Savaysa), rivaroxaban (Xarelto):   Eliquis: ophtho recommended CONTINUE eliquis do not stop    Additional Medication Instructions  Patient is to take all scheduled medications on the day of surgery EXCEPT for modifications listed below:   - Calcium Channel Blockers: May be continued on the  day of surgery.   - Diuretics: May continue due to heart failure.   - SGLT2 Inhibitor (canagliflozin, dapagliflozin, or empagliflozin): she discussed with ophtho and will continue as this is prescribed for heart failure NOT diabetes    - bisphosphonates (alendronate, ibandronate, risedronate): not scheduled day of surgery, continue as planned   - pregabalin, gabapentin: Continue without modification.   - DMARDs Orencia injection to be given after surgery per schedule   Prednisone 3 mg:  - Take usual dose on day of surgery    Recommendation  Approval given to proceed with proposed procedure, without further diagnostic evaluation.      I spent a total of 37 minutes on the day of the visit.   Time spent by me today doing chart review, history and exam, documentation and further activities per the note     Subjective   Amelia is a 64 year old, presenting for the following:  Pre-Op Exam          2/5/2025    10:17 AM   Additional Questions   Roomed by Coleen   Accompanied by Self         2/5/2025   Forms   Any forms needing to be completed Yes     HPI related to upcoming procedure: bilateral cataracts affecting vision        2/4/2025   Pre-Op Questionnaire   Have you ever had a heart attack or stroke? No   Have you ever had surgery on your heart or blood vessels, such as a stent placement, a coronary artery bypass, or surgery on an artery in your head, neck, heart, or legs? (!) YES 2 open heart surgeries, see surgical history   Do you have chest pain with activity? No   Do you have a history of heart failure? (!) YES - follows closely with cardiology   Do you currently have a cold, bronchitis or symptoms of other infection? No   Do you have a cough, shortness of breath, or wheezing? No   Do you or anyone in your family have previous history of blood clots? (!) YES - DVT from PICC line around 2017    Do you or does anyone in your family have a serious bleeding problem such as prolonged bleeding following surgeries or  cuts? (!) YES - thrombocytopenia and blood thinners    Have you ever had problems with anemia or been told to take iron pills? (!) YES - due to lymphoma. Normal recently.    Have you had any abnormal blood loss such as black, tarry or bloody stools, or abnormal vaginal bleeding? No   Have you ever had a blood transfusion? (!) YES - open heart surgery and with chemo 2017    Have you ever had a transfusion reaction? (!) YES -  reacted to platelets with hives, is premedicated with tylenol and benadryl    Are you willing to have a blood transfusion if it is medically needed before, during, or after your surgery? Yes   Have you or any of your relatives ever had problems with anesthesia? (!) YES  - nausea from anesthesia, needs zofran    Do you have sleep apnea, excessive snoring or daytime drowsiness? No   Do you have any artifical heart valves or other implanted medical devices like a pacemaker, defibrillator, or continuous glucose monitor? (!) YES   What type of device do you have? Pacemaker.   [employer] Tricuspid valve   Name of the clinic that manages your device [facility]   Do you have artificial joints? (!) YES - left total hip arthroplasty   Are you allergic to latex? No     Health Care Directive  Patient has a Health Care Directive on file      Preoperative Review of    reviewed - controlled substances reflected in medication list.      Status of Chronic Conditions:  See problem list for active medical problems.  Problems all longstanding and stable, except as noted/documented.  See ROS for pertinent symptoms related to these conditions.    Patient had recent cardiology follow up 1/31/25, conditions stable.   Cardiologist aware of this low risk procedure and no further changes/concerns.    Patient Active Problem List    Diagnosis Date Noted    Acute on chronic systolic (congestive) heart failure (H) 02/05/2025     Priority: Medium    Restless leg syndrome 04/22/2024     Priority: Medium     Peripheral polyneuropathy 04/22/2024     Priority: Medium    S/P total hip arthroplasty 01/24/2024     Priority: Medium    Falls frequently 12/23/2023     Priority: Medium    Prediabetes 12/23/2023     Priority: Medium    S/P TVR (tricuspid valve replacement) 04/27/2023     Priority: Medium    Heart failure with reduced ejection fraction, NYHA class II (H) 01/24/2023     Priority: Medium    Severe tricuspid regurgitation 12/19/2022     Priority: Medium    Hyponatremia 11/19/2022     Priority: Medium    Iatrogenic cushingoid features 06/09/2022     Priority: Medium    Steroid-induced osteoporosis 06/09/2022     Priority: Medium    Stage 3b chronic kidney disease (H) 10/16/2021     Priority: Medium    Mild protein-calorie malnutrition 10/29/2019     Priority: Medium    Embolism and thrombosis of unspecified artery (H) 10/29/2019     Priority: Medium    Thrombocytopenia, unspecified 10/29/2019     Priority: Medium    Cervical cancer screening 10/18/2018     Priority: Medium     2011, 2015 NIL paps  10/18/2018:NIL pap, Neg HPV Pap screening intervals discussed with oncologist, every 3 years should be fine. Magda Stringer PA-C   10/15/21 NIL pap, Neg HPV-every 3 year cotest due to immunopsupression  12/27/24 NIL pap, neg HPV      Atrial flutter, unspecified type (H) 05/17/2018     Priority: Medium    Paroxysmal atrial fibrillation (H) 05/11/2018     Priority: Medium    H/O cardiac arrest 09/26/2017     Priority: Medium    DLBCL (diffuse large B cell lymphoma) (H) 09/07/2017     Priority: Medium    Physical deconditioning 08/12/2017     Priority: Medium    Diffuse large B-cell lymphoma of extranodal site 07/27/2017     Priority: Medium    Critical illness myopathy 06/16/2017     Priority: Medium    Cardiogenic shock (H) 05/29/2017     Priority: Medium    Atrial tachycardia 05/16/2017     Priority: Medium    Lymphedema of both lower extremities 04/04/2017     Priority: Medium    RA (rheumatoid arthritis) (H) 05/02/2016      Priority: Medium    Hyperlipidemia LDL goal <130 02/22/2011     Priority: Medium    HTN (hypertension)      Priority: Medium    Primary pulmonary hypertension (H)      Priority: Medium     Seeing [facility].         Past Medical History:   Diagnosis Date    Acute on chronic diastolic heart failure (H) 11/19/2022    Antiplatelet or antithrombotic long-term use     Arrhythmia     Arthritis     Atrial fibrillation (H)     Atrial flutter (H)     Bradycardia 12/12/2019    Added automatically from request for surgery [id number]      Cancer (H) June 2017    DLBCL currently in remission    Cardiac arrest (H) 2017    Chronic kidney disease     Congestive heart failure (H)     Diffuse large B-cell lymphoma of extranodal site 2017    Edema, unspecified type 01/24/2023    Extremity restricted from procedure     Left arm    Fall 12/06/2023    Cut on head    H/O blood transfusion reaction     Hives with platelet transfusion, needs pre-medication with tylenol and benadryl    Heart murmur     History of blood clots 2017    PICC line Right arm     History of chemotherapy     History of transfusion     Hypertension     Hypervolemia, unspecified hypervolemia type 01/24/2023    Lymphedema of both lower extremities     wears lymphedema socks    Neuropathy     Feet and hands    NSVT (nonsustained ventricular tachycardia) (H) 12/2022    Pacemaker 2019    Pericardial effusion 2017    Physical deconditioning     PONV (postoperative nausea and vomiting)     Positive PPD, 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    Prediabetes     Pulmonary hypertension (H)     RA (rheumatoid arthritis) (H)     SSS (sick sinus syndrome) (H) 2019    Pacemaker    Steroid-induced osteoporosis     Thrombocytopenia     VSD (ventricular septal defect)      Past Surgical History:   Procedure Laterality Date    ANESTHESIA CARDIOVERSION N/A 05/17/2017    Procedure: ANESTHESIA CARDIOVERSION;  ANESTHESIA CARDIOVERSION;  Surgeon: GENERIC ANESTHESIA PROVIDER;  Location: [facility] OR    ANESTHESIA  CARDIOVERSION  03/12/2018    Procedure: ANESTHESIA CARDIOVERSION;;  Surgeon: GENERIC ANESTHESIA PROVIDER;  Location: [facility]    ANESTHESIA CARDIOVERSION N/A 03/23/2018    Procedure: ANESTHESIA CARDIOVERSION;  Anesthesia Cardioversion ;  Surgeon: GENERIC ANESTHESIA PROVIDER;  Location: [facility]    ANESTHESIA CARDIOVERSION N/A 04/12/2018    Procedure: ANESTHESIA CARDIOVERSION;  Cardioversion;  Surgeon: GENERIC ANESTHESIA PROVIDER;  [facility]    ARTHRODESIS WRIST  2000    Right wrist    BONE MARROW ASPIRATE &BIOPSY  07/12/2017         BONE MARROW BIOPSY W/ ASPIRATION  07/12/2017    CARDIOVERSION      5/17/17, 3/12/18, 3/23/18, 4/14/18,     COLONOSCOPY N/A 11/19/2019    Procedure: Colonoscopy, With Polypectomy And Biopsy;  Surgeon: Pj Vasques MD;  Location: [facility]    CV CORONARY ANGIOGRAM N/A 11/28/2022    Procedure: Coronary Angiogram;  Surgeon: Sundar Wood MD;  Location:  [facility] CARD[facility]AC CATH LAB    CV RIGHT HEART CATH MEASUREMENTS RECORDED N/A 11/28/2022    Procedure: Right Heart Catheterization;  Surgeon: Sundar Wood MD;  Location: [facility] CARDIAC CATH LAB    EP ABLATION PVC N/A 12/01/2022    Procedure: Ablation Premature Ventricular Contractions;  Surgeon: Juan Eaton MD;  Location: [facility] CARDIAC CATH LAB    EP PACEMAKER N/A 12/13/2019    Procedure: EP Pacemaker;  Surgeon: Pj Trent MD;  Location: [facility] CARDIAC CATH LAB    EXCISE LESION UPPER EXTREMITY Left 05/22/2018    Procedure: EXCISE LESION UPPER EXTREMITY;  Left Arm Wound Excision And Closure, Possible Submuscular Transposition of Ulnar Nerve  (Choice Anesthesia);  Surgeon: Kevin Sheldon MD;  Location:  OR    FOOT SURGERY      4 left and 2 right    FOOT SURGERY      4 Left and 2 Right     IMPLANT PACEMAKER  12/13/2019    Dr China Trent  [facility] Cardiac Cath lab     IMPLANT PACEMAKER      RELEASE CARPAL TUNNEL Bilateral     REPAIR VALVE TRICUSPID MINIMALLY INVASIVE N/A 04/10/2023    Procedure: MINIMALLY  INVASIVE TRICUSPID VALVE REPLACEMENT USING 29MM [employer] [place] EPIC VALVE, FEMORAL CANNULATION AND RIGHT GROIN CUTDOWN, CARDIOPULMONARY BYPASS, TRANSESOPHAGEAL ECHOCARDIOGRAM PERFORMED BY ANESTHESIA STAFF;  Surgeon: Chilango Cope MD;  Location: [facility] OR    REPAIR VENTRICULAR SEPTAL DEFECT  1969    TOTAL HIP ARTHROPLASTY Left 1/24/2024    Procedure: LEFT TOTAL HIP ARTHROPLASTY DIRECT ANTERIOR APPROACH ORTHOGRID;  Surgeon: Romeo Quintana MD;  [facility]    TRANSPOSITION ULNAR NERVE (ELBOW) Left 05/22/2018    Procedure: TRANSPOSITION ULNAR NERVE (ELBOW);;  Surgeon: Kevin Sheldon MD;  Location: [facility] OR    VSD REPAIR  1969    ZZC FUSION FOOT BONES,SUBTALAR Right 10/20/2020    Procedure: RIGHT SUBTALAR JOINT FUSION AND CALCANEOCUBOID FUSION;  Surgeon: Nemesio Crow MD;  Location: [facility] Main OR;  Service: Orthopedics     Current Outpatient Medications   Medication Sig Dispense Refill    Abatacept (ORENCIA) 125 MG/ML SOSY pre-filled syringe Inject 1 mL (125 mg) subcutaneously once a week. 4 mL 5    acetaminophen (TYLENOL) 325 MG tablet Take 3 tablets (975 mg) by mouth every 8 hours      aspirin (ASA) 81 MG chewable tablet 1 tablet (81 mg) by Oral or NG Tube route daily 30 tablet 2    calcium carbonate 600 mg-vitamin D 400 units (CALTRATE) 600-400 MG-UNIT per tablet Take 2 tablets by mouth daily      diltiazem ER (DILT-XR) 240 MG 24 hr ER beaded capsule Take 1 capsule (240 mg) by mouth daily. 90 capsule 3    docusate sodium (COLACE) 100 MG capsule Take 100 mg by mouth 2 times daily as needed for constipation      ELIQUIS ANTICOAGULANT 5 MG tablet TAKE 1 TABLET(5 MG) BY MOUTH TWICE DAILY 180 tablet 3    empagliflozin (JARDIANCE) 10 MG TABS tablet Take 1 tablet (10 mg) by mouth daily. 90 tablet 3    gabapentin (NEURONTIN) 100 MG capsule Take 1 capsule (100 mg) by mouth 2 times daily AND 2 capsules (200 mg) at bedtime. 360 capsule 3    hydroxychloroquine (PLAQUENIL) 200 MG tablet Take 1  tablet (200 mg) by mouth daily. 90 tablet 1    magnesium oxide 200 MG TABS Take 200 mg by mouth daily bedtime      potassium chloride timmy ER (KLOR-CON M20) 20 MEQ CR tablet Take 3 tablets (60 mEq) by mouth daily 270 tablet 3    predniSONE (DELTASONE) 1 MG tablet Take 3 tablets (3 mg) by mouth daily - Oral 270 tablet 3    spironolactone (ALDACTONE) 25 MG tablet Take 0.5 tablets (12.5 mg) by mouth daily. 45 tablet 3    torsemide (DEMADEX) 20 MG tablet Take 1 tablet (20 mg) by mouth daily. 90 tablet 3    Vitamin D (Cholecalciferol) 10 MCG (400 UNIT) TABS Take 1 tablet by mouth daily      zoledronic acid (RECLAST) 5 MG/100ML infusion 5 mg every year.      dexAMETHasone (DECADRON) 4 MG/ML injection Inject 4 mg for adrenal crisis (Patient not taking: Reported on 2/5/2025) 2 mL 1    loratadine (CLARITIN) 10 MG tablet Take 10 mg by mouth daily as needed (Patient not taking: Reported on 2/5/2025)      predniSONE (DELTASONE) 10 MG tablet Take 1 tablet (10 mg) by mouth daily. (Patient not taking: Reported on 2/5/2025) 20 tablet 1    sulfamethoxazole-trimethoprim (BACTRIM DS) 800-160 MG tablet Take 1 tablet by mouth 2 times daily (Patient not taking: Reported on 2/5/2025) 4 tablet 5       Allergies   Allergen Reactions    Blood Transfusion Related (Informational Only) Hives     Hives with platelets. Give benadryl premedication.    Amiodarone Other (See Comments) and Difficulty breathing     Lethargic and had trouble breathing- occurred in 2017    Metoprolol Other (See Comments)     Pt and  report that metoprolol does not work for her and also reports feeling unwell with this medication. She has been able to tolerate atenolol, which as worked in controlling her HR.     Penicillins Other (See Comments)     Childhood reaction, concern for tongue swelling    Tape [Adhesive Tape] Rash        Social History     Tobacco Use    Smoking status: Never    Smokeless tobacco: Never   Substance Use Topics    Alcohol use: Yes      "Comment: 2 drinks per week     Family History   Problem Relation Age of Onset    Breast Cancer Mother         60s    Hypertension Mother     Alzheimer Disease Mother     Cerebrovascular Disease Mother     Hypertension Father     Cerebrovascular Disease Father     Atrial fibrillation Father     Lymphoma Father     Prostate Cancer Father     Other Cancer Father         some form of Lymphoma    Alzheimer Disease Maternal Grandmother         likely    Arthritis Maternal Grandfather     Pneumonia Maternal Grandfather     Diabetes Paternal Grandmother     Mental Illness Sister         early onset  alzheimers     History   Drug Use Unknown             Review of Systems  CONSTITUTIONAL: NEGATIVE for fever, chills, change in weight  INTEGUMENTARY/SKIN: NEGATIVE for worrisome rashes, moles or lesions  EYES: POSITIVE bilateral cataracts affecting vision  ENT/MOUTH: NEGATIVE for ear, mouth and throat problems  RESP: NEGATIVE for significant cough or SOB  BREAST: NEGATIVE for masses, tenderness or discharge  CV: NEGATIVE for chest pain, palpitations or peripheral edema  GI: NEGATIVE for nausea, abdominal pain, heartburn, or change in bowel habits  : NEGATIVE for frequency, dysuria, or hematuria  MUSCULOSKELETAL: NEGATIVE for significant arthralgias or myalgia  NEURO: NEGATIVE for weakness, dizziness or paresthesias  ENDOCRINE: NEGATIVE for temperature intolerance, skin/hair changes  HEME: NEGATIVE for bleeding problems  PSYCHIATRIC: NEGATIVE for changes in mood or affect    Objective    /75   Pulse 94   Temp 97.4  F (36.3  C) (Tympanic)   Resp 16   Ht 1.448 m (4' 9.01\")   Wt 55.6 kg (122 lb 9.6 oz)   SpO2 98%   BMI 26.52 kg/m     Estimated body mass index is 26.52 kg/m  as calculated from the following:    Height as of this encounter: 1.448 m (4' 9.01\").    Weight as of this encounter: 55.6 kg (122 lb 9.6 oz).  Physical Exam  GENERAL: alert and no distress  EYES: Eyes grossly normal to inspection, PERRL and " conjunctivae and sclerae normal  HENT: ear canals and TM's normal, nose and mouth without ulcers or lesions  NECK: no adenopathy, no asymmetry, masses, or scars  RESP: lungs clear to auscultation - no rales, rhonchi or wheezes  CV: regular rate and rhythm, normal S1 S2, no S3 or S4, no murmur, click or rub, no peripheral edema  ABDOMEN: soft, nontender, no hepatosplenomegaly, no masses and bowel sounds normal  MS: no gross musculoskeletal defects noted, no edema  SKIN: no suspicious lesions or rashes  NEURO: Normal strength and tone, mentation intact and speech normal  PSYCH: mentation appears normal, affect normal/bright    Recent Labs   Lab Test 12/27/24  1204 12/11/24  1424 10/23/24  1427 10/02/24  1126   HGB  --  15.7 15.3  --    PLT  --  110* 103*  --    NA  --  136  --  136   POTASSIUM  --  4.7  --  4.1   CR  --  1.27* 1.16 1.16   A1C 5.2  --   --   --         Diagnostics  No labs were ordered during this visit.   No EKG required for low risk surgery (cataract, skin procedure, breast biopsy, etc).    Revised Cardiac Risk Index (RCRI)  The patient has the following serious cardiovascular risks for perioperative complications:   - Congestive Heart Failure (pulmonary edema, PND, s3 antionette, CXR with pulmonary congestion, basilar rales) = 1 point     RCRI Interpretation: 1 point: Class II (low risk - 0.9% complication rate)         Signed Electronically by: Gala Stringer PA-C  A copy of this evaluation report is provided to the requesting physician.

## 2025-02-05 NOTE — PATIENT INSTRUCTIONS
How to Take Your Medication Before Surgery  Preoperative Medication Instructions   Antiplatelet or Anticoagulation Medication Instructions   - Bleeding risk is low for this procedure (e.g. dental, skin, cataract).    Additional Medication Instructions  Continue your other medications       Patient Education   Preparing for Your Surgery  For Adults  Getting started  In most cases, a nurse will call to review your health history and instructions. They will give you an arrival time based on your scheduled surgery time. Please be ready to share:  Your doctor's clinic name and phone number  Your medical, surgical, and anesthesia history  A list of allergies and sensitivities  A list of medicines, including herbal treatments and over-the-counter drugs  Whether the patient has a legal guardian (ask how to send us the papers in advance)  Note: You may not receive a call if you were seen at our PAC (Preoperative Assessment Center).  Please tell us if you're pregnant--or if there's any chance you might be pregnant. Some surgeries may injure a fetus (unborn baby), so they require a pregnancy test. Surgeries that are safe for a fetus don't always need a test, and you can choose whether to have one.   Preparing for surgery  Within 10 to 30 days of surgery: Have a pre-op exam (sometimes called an H&P, or History and Physical). This can be done at a clinic or pre-operative center.  If you're having a , you may not need this exam. Talk to your care team.  At your pre-op exam, talk to your care team about all medicines you take. (This includes CBD oil and any drugs, such as THC, marijuana, and other forms of cannabis.) If you need to stop any medicine before surgery, ask when to start taking it again.  This is for your safety. Many medicines and drugs can make you bleed too much during surgery. Some change how well surgery (anesthesia) drugs work.  Call your insurance company to let them know you're having surgery. (If you  don't have insurance, call [phone].)  Call your clinic if there's any change in your health. This includes a scrape or scratch near the surgery site, or any signs of a cold (sore throat, runny nose, cough, rash, fever).  Eating and drinking guidelines  For your safety: Unless your surgeon tells you otherwise, follow the guidelines below.  Eat and drink as normal until 8 hours before you arrive for surgery. After that, no food or milk. You can spit out gum when you arrive.  Drink clear liquids until 2 hours before you arrive. These are liquids you can see through, like water, Gatorade, and Propel Water. They also include plain black coffee and tea (no cream or milk).  No alcohol for 24 hours before you arrive. The night before surgery, stop any drinks that contain THC.  If your care team tells you to take medicine on the morning of surgery, it's okay to take it with a sip of water. No other medicines or drugs are allowed (including CBD oil)--follow your care team's instructions.  If you have questions the day of surgery, call your hospital or surgery center.   Preventing infection  Shower or bathe the night before and the morning of surgery. Follow the instructions your clinic gave you. (If no instructions, use regular soap.)  Don't shave or clip hair near your surgery site. We'll remove the hair if needed.  Don't smoke or vape the morning of surgery. No chewing tobacco for 6 hours before you arrive. A nicotine patch is okay. You may spit out nicotine gum when you arrive.  For some surgeries, the surgeon will tell you to fully quit smoking and nicotine.  We will make every effort to keep you safe from infection. We will:  Clean our hands often with soap and water (or an alcohol-based hand rub).  Clean the skin at your surgery site with a special soap that kills germs.  Give you a special gown to keep you warm. (Cold raises the risk of infection.)  Wear hair covers, masks, gowns, and gloves during surgery.  Give  antibiotic medicine, if prescribed. Not all surgeries need this medicine.  What to bring on the day of surgery  Photo ID and insurance card  Copy of your health care directive, if you have one  Glasses and hearing aids (bring cases)  You can't wear contacts during surgery  Inhaler and eye drops, if you use them (tell us about these when you arrive)  CPAP machine or breathing device, if you use them  A few personal items, if spending the night  If you have . . .  A pacemaker, ICD (cardiac defibrillator), or other implant: Bring the ID card.  An implanted stimulator: Bring the remote control.  A legal guardian: Bring a copy of the certified (court-stamped) guardianship papers.  Please remove any jewelry, including body piercings. Leave jewelry and other valuables at home.  If you're going home the day of surgery  You must have a responsible adult drive you home. They should stay with you overnight as well.  If you don't have someone to stay with you, and you aren't safe to go home alone, we may keep you overnight. Insurance often won't pay for this.  After surgery  If it's hard to control your pain or you need more pain medicine, please call your surgeon's office.  Questions?   If you have any questions for your care team, list them here:   ____________________________________________________________________________________________________________________________________________________________________________________________________________________________________________________________  For informational purposes only. Not to replace the advice of your health care provider. Copyright   2003, 2019 [facility]. All rights reserved. Clinically reviewed by Emery Goodman MD. [employer] [id number] - REV 08/24.

## 2025-02-27 NOTE — PATIENT INSTRUCTIONS
"Recommendations from today's MTM visit:                                                       1. Actemra was approved through your insurance and sent to [facility] specialty pharmacy. The pharmacy will call you to set up delivery so you can start Actemra 162 mg (1 injection) every 14 days.     2. A common side effect of Actemra is injection site reactions (red, raised, itchy spot at injection site). You can use hydrocortisone cream and ice to treat these reactions if they occur.     3. Vaccine recommendations: Consider receiving your RSV vaccine with your regular covid and influenza vaccines this fall.    Follow-up: Return in about 3 months (around 10/3/2024) for MTM Pharmacist Visit.    It was great speaking with you today.  I value your experience and would be very thankful for your time in providing feedback in our clinic survey. In the next few days, you may receive an email or text message from [employer] with a link to a survey related to your  clinical pharmacist.\"     To schedule another MTM appointment, please call the clinic directly or you may call the MTM scheduling line at [phone].    My Clinical Pharmacist's contact information:                                                      Please feel free to contact me with any questions or concerns you have.      Archana Brar, PharmD  Medication Therapy Management Pharmacist  [facility]  Phone: [phone]     "
29.7

## 2025-03-30 NOTE — PROGRESS NOTES
[facility]   CORE Clinic      HPI:   Ms. 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     With regards to her cardiac conditions, as noted, Ms. Michel presented to [facility] on 11/19/22 with MONTALVO, BLE edema, and 8 pound weight gain over the course of one week. Chest X-ray was suspicious for pulmonary edema, NT-P BNP 1,913, Troponin T 21-->12. EDW per chart review 116-120; admission weight 128. She was admitted w/ decompensated diastolic heart failure with pictutre c/b frequent non sustained ventricular tachycardia with RHC on 11/28/22 notable for cardiogenic shock with a cardiac index of 1.2 and CO of 1.6. Etiology thought to most likely secondary to frequent non sustained ventricular tachycardia in the setting of possible restrictive/constrictive physiology given her malignancy history (albeit presumably in remission). CAD ruled on by coronary angiogram completed on 11/28/22. She underwent VT ablation on 12/1/22 and had successful resumption of her home medications with increased diuretic dosing and adddition of an SGLT2 inhibitor. She was diuresed 19 lbs to a weight of 109 lbs at time of discharge.     At CORE visit on 1/9/23 Ms. Michel was hypervolemic as  evident by weight gain, tachycardia, and peripheral edema. Review of labs demonstrated persistent hyponatremia with Na 127 and bump in Cr to 1.35. Lasix was increase 60 mg BID. Still awaiting for PA for cardiomems device. Admitted 1/24-1/29 for CHF exacerbation. Diuresed and switched to torsemide 40 mg BID. Discharged at a weight of 107 lb.     Device interrogation  from 2/7 with rates 120s-140, AFL. Diltiazem increased to 240 mg daily. Since our last CORE visit in 2/2023 she has resumed torsemide 20 mg BID and spironolactone 12.5 mg daily. She was evaluated by Dr. Cope for severe TR. Admitted 4/10-4/17/23 for elective TVR with Dr. Cope.     CORE visit in 5/2023, Ms. Michel was feeling well. Weights are very stable at 103-104 lbs. Echo at that time showed preserved LVEF and mildly improved RV function. Diltiazem was increased to 180 mg daily for elevated heart rates. Started on spironolactone. CORE visit 9/2023 feeling well. Weight stable at 108 lb. No medication changes.   Underwent hip surgery in January 2024.     Ms. Michel is doing well today. Her joints are aching more with the precipitation; however, she is not limited by cardiac concerns and MONTALVO. No SOB at rest. She is still walking 15 min twice a day in her driveway but just has to stop and start more due to joint pain. Weights have been stable and she was 122.5 this am. Amelia communicated the desire to make some diet adjustments and cut down on the sweets post holidays. She has not noticed any increases in fluid weight as of late but in the past she has felt it in her belly. Denies orthopnea/PND, lightheadedness, palpitations, chest discomfort, or pain. She takes her BP regularly at home where it is typically in the 110s over mid 70s.     Cardiac Medications:   - ASA 81 mg daily  - Eliquis 5 mg BID  - Diltiazem 240 mg daily   - Jardiance 10 mg daily   - Torsemide 20 mg daily  - KCL 60 mEq daily    - Spironolactone 12.5 mg daily      PAST MEDICAL HISTORY:  Past Medical History:   Diagnosis Date    Acute on chronic diastolic heart failure (H) 11/19/2022    Antiplatelet or antithrombotic long-term use     Arrhythmia     Arthritis     Atrial fibrillation (H)     Atrial flutter (H)     Bradycardia 12/12/2019    Added automatically from request for surgery [id number]      Cancer (H) June 2017    DLBCL currently in  remission    Cardiac arrest (H) 2017    Chronic kidney disease     Congestive heart failure (H)     Diffuse large B-cell lymphoma of extranodal site 2017    Edema, unspecified type 01/24/2023    Extremity restricted from procedure     Left arm    Fall 12/06/2023    Cut on head    H/O blood transfusion reaction     Hives with platelet transfusion, needs pre-medication with tylenol and benadryl    Heart murmur     History of blood clots 2017    PICC line Right arm     History of chemotherapy     History of transfusion     Hypertension     Hypervolemia, unspecified hypervolemia type 01/24/2023    Lymphedema of both lower extremities     wears lymphedema socks    Neuropathy     Feet and hands    NSVT (nonsustained ventricular tachycardia) (H) 12/2022    Pacemaker 2019    Pericardial effusion 2017    Physical deconditioning     PONV (postoperative nausea and vomiting)     Positive PPD, 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    Prediabetes     Pulmonary hypertension (H)     RA (rheumatoid arthritis) (H)     SSS (sick sinus syndrome) (H) 2019    Pacemaker    Steroid-induced osteoporosis     Thrombocytopenia     VSD (ventricular septal defect)        FAMILY HISTORY:  Family History   Problem Relation Age of Onset    Breast Cancer Mother         60s    Hypertension Mother     Alzheimer Disease Mother     Cerebrovascular Disease Mother     Hypertension Father     Cerebrovascular Disease Father     Atrial fibrillation Father     Lymphoma Father     Prostate Cancer Father     Other Cancer Father         some form of Lymphoma    Alzheimer Disease Maternal Grandmother         likely    Arthritis Maternal Grandfather     Pneumonia Maternal Grandfather     Diabetes Paternal Grandmother     Mental Illness Sister         early onset  alzheimers       SOCIAL HISTORY:  Social History     Socioeconomic History    Marital status:      Spouse name: Romeo Michel    Number of children: 0   Occupational History     Employer: [employer]    Tobacco Use    Smoking status: Never    Smokeless tobacco: Never   Substance and Sexual Activity    Alcohol use: Not Currently     Comment: none    Drug use: No   Other Topics Concern    Parent/sibling w/ CABG, MI or angioplasty before 65F 55M? No     Social Determinants of Health     Intimate Partner Violence: Not At Risk    Fear of Current or Ex-Partner: No    Emotionally Abused: No    Physically Abused: No    Sexually Abused: No       CURRENT MEDICATIONS:  Current Outpatient Medications   Medication Sig Dispense Refill    Abatacept (ORENCIA) 125 MG/ML SOSY pre-filled syringe Inject 1 mL (125 mg) subcutaneously once a week. 4 mL 5    acetaminophen (TYLENOL) 325 MG tablet Take 3 tablets (975 mg) by mouth every 8 hours      aspirin (ASA) 81 MG chewable tablet 1 tablet (81 mg) by Oral or NG Tube route daily 30 tablet 2    calcium carbonate 600 mg-vitamin D 400 units (CALTRATE) 600-400 MG-UNIT per tablet Take 2 tablets by mouth daily      dexAMETHasone (DECADRON) 4 MG/ML injection Inject 4 mg for adrenal crisis 2 mL 1    diltiazem ER (DILT-XR) 240 MG 24 hr ER beaded capsule Take 1 capsule (240 mg) by mouth daily. 90 capsule 3    docusate sodium (COLACE) 100 MG capsule Take 100 mg by mouth 2 times daily as needed for constipation      ELIQUIS ANTICOAGULANT 5 MG tablet TAKE 1 TABLET(5 MG) BY MOUTH TWICE DAILY 180 tablet 3    empagliflozin (JARDIANCE) 10 MG TABS tablet Take 1 tablet (10 mg) by mouth daily. 90 tablet 3    gabapentin (NEURONTIN) 100 MG capsule Take 1 capsule (100 mg) by mouth 2 times daily AND 2 capsules (200 mg) at bedtime. 360 capsule 3    hydroxychloroquine (PLAQUENIL) 200 MG tablet Take 1 tablet (200 mg) by mouth daily. 90 tablet 1    loratadine (CLARITIN) 10 MG tablet Take 10 mg by mouth daily as needed.      magnesium oxide 200 MG TABS Take 200 mg by mouth daily bedtime      potassium chloride timmy ER (KLOR-CON M20) 20 MEQ CR tablet Take 3 tablets (60 mEq) by mouth daily 270 tablet 3    predniSONE  (DELTASONE) 1 MG tablet Take 3 tablets (3 mg) by mouth daily - Oral 270 tablet 3    predniSONE (DELTASONE) 10 MG tablet Take 1 tablet (10 mg) by mouth daily. 20 tablet 1    spironolactone (ALDACTONE) 25 MG tablet Take 0.5 tablets (12.5 mg) by mouth daily. 45 tablet 3    sulfamethoxazole-trimethoprim (BACTRIM DS) 800-160 MG tablet Take 1 tablet by mouth 2 times daily 4 tablet 5    torsemide (DEMADEX) 20 MG tablet Take 1 tablet (20 mg) by mouth daily. 90 tablet 3    Vitamin D (Cholecalciferol) 10 MCG (400 UNIT) TABS Take 1 tablet by mouth daily      zoledronic acid (RECLAST) 5 MG/100ML infusion 5 mg every year.       No current facility-administered medications for this visit.       ROS:   Refer to HPI    EXAM:  /82 (BP Location: Right arm, Patient Position: Chair, Cuff Size: Adult Regular)   Pulse 91   Wt 56.5 kg (124 lb 9.6 oz)   SpO2 95%   BMI 26.96 kg/m      GENERAL: Appears comfortable, in no acute distress.   HEENT: Eye symmetrical, no discharge or icterus bilaterally. Mucous membranes moist and without lesions.  CV: Normal rate and rhythm, +S1S2, systolic murmur, no rub, or gallop. JVP lower third of neck.    RESPIRATORY: Respirations regular, even, and unlabored. Lungs CTA throughout.   GI: Soft and non distended with normoactive bowel sounds present in all quadrants. No tenderness, rebound, guarding. No hepatomegaly.   EXTREMITIES: No peripheral edema. Extremities warm and well perfused.    NEUROLOGIC: Alert and oriented x 3. No focal deficits.   MUSCULOSKELETAL: No joint swelling or tenderness. Joint pain with movement alleviated by short periods of rest.   SKIN: No jaundice. No rashes or lesions.     Labs, reviewed with patient in clinic today:  CBC RESULTS:  Lab Results   Component Value Date    WBC 5.4 04/02/2025    WBC Canceled, Test credited 03/16/2021    RBC 4.69 04/02/2025    RBC Canceled, Test credited 03/16/2021    HGB 16.2 04/02/2025    HGB Canceled, Test credited 03/16/2021    HCT 45.7  04/02/2025    HCT Canceled, Test credited 03/16/2021    MCV 97 04/02/2025    MCV Canceled, Test credited 03/16/2021    MCH 34.5 (H) 04/02/2025    MCH Canceled, Test credited 03/16/2021    MCHC 35.4 04/02/2025    MCHC Canceled, Test credited 03/16/2021    RDW 11.9 04/02/2025    RDW Canceled, Test credited 03/16/2021     (L) 04/02/2025    PLT Canceled, Test credited 03/16/2021       CMP RESULTS:  Lab Results   Component Value Date     04/02/2025     (L) 11/29/2022     06/23/2021    POTASSIUM 3.8 04/02/2025    POTASSIUM 3.5 04/10/2023    POTASSIUM 3.9 07/20/2022    POTASSIUM 4.2 06/23/2021    CHLORIDE 97 (L) 04/02/2025    CHLORIDE 101 07/20/2022    CHLORIDE 102 06/23/2021    CO2 25 04/02/2025    CO2 28 07/20/2022    CO2 28 06/23/2021    ANIONGAP 14 04/02/2025    ANIONGAP 7 07/20/2022    ANIONGAP 6 06/23/2021     (H) 04/02/2025     (H) 01/24/2024     (H) 07/20/2022    GLC 98 06/23/2021    BUN 30.6 (H) 04/02/2025    BUN 31 (H) 07/20/2022    BUN 25 06/23/2021    CR 1.11 04/02/2025    CR 1.05 (H) 06/23/2021    GFRESTIMATED 55 (L) 04/02/2025    GFRESTIMATED 57 (L) 06/23/2021    GFRESTBLACK 67 06/23/2021    VIKTORIYA 10.1 04/02/2025    VIKTORIYA 10.0 06/23/2021    BILITOTAL 0.8 04/02/2025    BILITOTAL 1.2 06/23/2021    ALBUMIN 4.8 04/02/2025    ALBUMIN 4.4 04/20/2022    ALBUMIN 4.2 06/23/2021    ALKPHOS 96 04/02/2025    ALKPHOS 136 06/23/2021    ALT 20 04/02/2025    ALT 41 06/23/2021    AST 36 04/02/2025    AST 36 06/23/2021        INR RESULTS:  Lab Results   Component Value Date    INR 1.71 (H) 04/10/2023    INR 3.42 (H) 03/07/2018       Lab Results   Component Value Date    MAG 2.0 04/16/2023    MAG 1.9 04/12/2018     Lab Results   Component Value Date    NTBNPI 2,067 (H) 01/24/2023    NTBNPI 485 12/12/2019     Lab Results   Component Value Date    NTBNP 1,425 (H) 10/13/2021    NTBNP 1,274 (H) 05/26/2021       Cardiac Diagnostics:    Cardiac Device Check 4/2/2025  Device: Medtronic [id number]  Claudia XT DR MRI  Normal device function.   Mode: DDDR  bpm  AP: 79.5%  : 98.4%  Intrinsic Rhythm: CHB- AS @ 66/  @ 30 bpm with frequent PVCs  Short V-V intervals: 0  Lead Trends Appear Stable.   Estimated battery longevity to RRT = 7.6 years. Battery voltage = 2.98 V.    Atrial Arrhythmia: 110 AT/AFL episodes recorded  AF [place]: 10.3%  Anticoagulant: Apixaban  Ventricular Arrhythmia: 11 NSVT lasting 2-3 seconds 158-207 bpm  Setting Changes: None    5/24/2023 Echo   Interpretation Summary  Tricuspid valve replacement with 29mm [place] Sil[place]io Epic.  Doppler interrogation of the tricspid valve is normal.  Global and regional left ventricular function is normal with an EF of 55-60%.  The right ventricle is normal size. Global right ventricular function is  mildly to moderately reduced.  Severe biatrial enlargement is present.  Mild to moderate mitral insufficiency is present.  IVC diameter >2.1 cm collapsing <50% with sniff suggests a high RA pressure  estimated at 15 mmHg or greater.  No pericardial effusion is present.  Compared to prior, RV appears slightly better, CVP is higher now.    4/17/2023 Echo   Interpretation Summary  Minimally invasive tricuspid valve replacement with 29mm St Silvio Epic. Doppler  interrogation of the tricspid valve is normal. TV mean gradient 4-6 mmHg.  Moderate right ventricular dilation is present. Global right ventricular  function is mildly to moderately reduced.  Global and regional left ventricular function is normal with an EF of 60-65%.  Mild to moderate mitral insufficiency is present.  IVC diameter <2.1 cm collapsing >50% with sniff suggests a normal RA pressure  of 3 mmHg.  No pericardial effusion is present.  Compared to prior TVR is new.    2/7/2023 Device Interrogation   Device: Switchflytronic [id number] Golf XT DR MRI  Normal Device Function.  Mode: VVIR  bpm  : 8.6%  Presenting EGM: Narrow Complex tachycardia @ 139 bpm  Short V-V intervals: 0  Lead Trends Appear  Stable.  Estimated battery longevity to RRT = 11 years. Battery voltage = 3.02 V.  Atrial Arrhythmia: AFL  Anticoagulant: Apixaban  Ventricular Arrhythmia: 4 episodes lasting 1-4 sec 194-245 bpm  Transmission discussed with Maria Esther Cutler NP. Orders to increase diltiazem from 180 mg daily to 240 mg daily.    12/5/2022 ECHO  Interpretation Summary  Severe tricuspid insufficiency is present.  Moderate right ventricular dilation is present. Global right ventricular  function is mildly reduced.  Global and regional left ventricular function is normal with an EF of 55-60%.  IVC diameter >2.1 cm collapsing <50% with sniff suggests a high RA pressure  estimated at 15 mmHg or greater.  No pericardial effusion is present.  No significant changes noted.    11/28/2022 RHC with coronary angiogram   Right sided filling pressures are severely elevated.  Mild elevated pulmonary hypertension.  Left sided filling pressures are moderately elevated.  Reduced cardiac output level.  Hemodynamic data has been modified in Epic per physician review.  Prox LAD lesion is 30% stenosed.     Mild non-obstructive coronary artery disease        Assessment and Plan:   Ms. 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 Presents today for CORE follow-up.     Overall, appearing well. Euvolemic by exam. Review of today's labs demonstrate stable renal function (Cr trending down) and normal lytes. Pt knows to improve diet and looks forward to increased mobility in the warmer months. No medication changes. Doesn't require refills at this  time. Echo reordered to evaluate EF in consideration of increased R ventricular pacing. Depending on echo results might consider CRT-D.     # Acute on chronic diastolic heart failure with dilated and mildly reduced RV function (LVEF 55-60%)  Pre-Ablation: 11/28/22 RHC: CVP 24, PA 48/27/34, PCWP 23, SVO2 46, CI 1.1, CO 1.6  Post Ablation and Diuresis: 12/3/22 RHC: CVP 16, PA 41/22/28, PCWP 15, SVO2 69, CI 2.1, CO 3.0    NYHA Class II, AHA/ACC Stage C  Rate control: 91, continue diltiazem   volume status: euvolemic, continue torsemide 20 mg daily    Blood pressure control:  Diltiazem 240 mg daily   Aldosterone antagonist: Aldactone 12.5 mg daily   SGLT2:  Empagliflozin 10 mg daily   Evaluation of coronary arteries: 11/28/22 angiogram mild non-obstructive CAD     # Frequent, non-sustained ventricular tachycardia s/p VT Ablation (by Dr. Eaton on 12/1/22)  - Anti-arrhythmic: none, s/p VT ablation  - Stopped PTA digoxin per EP recommendations  - Anticoagulation: given history of Afib, continue Apixaban 5 mg bid  - Follow up with EP specialists (Dr. Eaton and/or Maria Esther Cutler NP)    # HTN Intolerant to Metoprolol in the past.   - Diltiazem 240 mg daily, spironolactone 12.5 mg daily       # Aflutter. History of SSS s/p PPM 12/19.   Long standing history of atrial arrythmias.  - Diltiazem 240 mg daily  - Continue Eliquis.   - update echo given high V pacing percentage, may need to consider upgrade to CRT  - Follow up with EP annually.       # VSD s/p repair.     # History of exudative pericardial effusion.   - Stable per CT 3/19.     # Severe TR per TTE 11/2022 s/p TVR on 4/10/23  - continue ASA 81 mg daily         Follow up:  - Reschedule echo, need to reassess EF given high V pacing percentages   - CORE 6 months      [facility] ROGELIO-P Student     I was present with the NP student who participated in the care and documentation of the services provided. I have verified the history and personally performed the physical  exam and medical decision making, as documented by the student and edited by me.     Elmira Vasquez DNP, NP-C  Advanced Heart Failure  4/2/2025

## 2025-04-01 DIAGNOSIS — I50.22 CHRONIC SYSTOLIC HEART FAILURE (H): Primary | ICD-10-CM

## 2025-04-02 ENCOUNTER — ANCILLARY PROCEDURE (OUTPATIENT)
Dept: CARDIOLOGY | Facility: CLINIC | Age: 65
End: 2025-04-02
Attending: INTERNAL MEDICINE
Payer: COMMERCIAL

## 2025-04-02 ENCOUNTER — LAB (OUTPATIENT)
Dept: LAB | Facility: CLINIC | Age: 65
End: 2025-04-02
Attending: NURSE PRACTITIONER
Payer: COMMERCIAL

## 2025-04-02 ENCOUNTER — OFFICE VISIT (OUTPATIENT)
Dept: CARDIOLOGY | Facility: CLINIC | Age: 65
End: 2025-04-02
Attending: NURSE PRACTITIONER
Payer: COMMERCIAL

## 2025-04-02 VITALS
HEART RATE: 91 BPM | OXYGEN SATURATION: 95 % | BODY MASS INDEX: 26.96 KG/M2 | WEIGHT: 124.6 LBS | SYSTOLIC BLOOD PRESSURE: 124 MMHG | DIASTOLIC BLOOD PRESSURE: 82 MMHG

## 2025-04-02 DIAGNOSIS — I50.22 CHRONIC SYSTOLIC HEART FAILURE (H): ICD-10-CM

## 2025-04-02 DIAGNOSIS — M06.9 RHEUMATOID ARTHRITIS OF BOTH ANKLES, UNSPECIFIED WHETHER RHEUMATOID FACTOR PRESENT (H): ICD-10-CM

## 2025-04-02 DIAGNOSIS — R00.1 BRADYCARDIA: ICD-10-CM

## 2025-04-02 DIAGNOSIS — I50.33 ACUTE ON CHRONIC DIASTOLIC CONGESTIVE HEART FAILURE (H): Primary | ICD-10-CM

## 2025-04-02 LAB
ALBUMIN SERPL BCG-MCNC: 4.8 G/DL (ref 3.5–5.2)
ALP SERPL-CCNC: 96 U/L (ref 40–150)
ALT SERPL W P-5'-P-CCNC: 20 U/L (ref 0–70)
ANION GAP SERPL CALCULATED.3IONS-SCNC: 14 MMOL/L (ref 7–15)
AST SERPL W P-5'-P-CCNC: 36 U/L (ref 0–45)
BASOPHILS # BLD AUTO: 0 10E3/UL (ref 0–0.2)
BASOPHILS NFR BLD AUTO: 1 %
BILIRUB SERPL-MCNC: 0.8 MG/DL
BUN SERPL-MCNC: 30.6 MG/DL (ref 8–23)
CALCIUM SERPL-MCNC: 10.1 MG/DL (ref 8.8–10.4)
CHLORIDE SERPL-SCNC: 97 MMOL/L (ref 98–107)
CREAT SERPL-MCNC: 1.11 MG/DL (ref 0.51–1.17)
CRP SERPL-MCNC: 14.9 MG/L
EGFRCR SERPLBLD CKD-EPI 2021: 55 ML/MIN/1.73M2
EOSINOPHIL # BLD AUTO: 0 10E3/UL (ref 0–0.7)
EOSINOPHIL NFR BLD AUTO: 1 %
ERYTHROCYTE [DISTWIDTH] IN BLOOD BY AUTOMATED COUNT: 11.9 % (ref 10–15)
ERYTHROCYTE [SEDIMENTATION RATE] IN BLOOD BY WESTERGREN METHOD: 10 MM/HR (ref 0–30)
GLUCOSE SERPL-MCNC: 103 MG/DL (ref 70–99)
HCO3 SERPL-SCNC: 25 MMOL/L (ref 22–29)
HCT VFR BLD AUTO: 45.7 % (ref 35–53)
HGB BLD-MCNC: 16.2 G/DL (ref 11.7–17.7)
IMM GRANULOCYTES # BLD: 0 10E3/UL
IMM GRANULOCYTES NFR BLD: 0 %
LYMPHOCYTES # BLD AUTO: 0.5 10E3/UL (ref 0.8–5.3)
LYMPHOCYTES NFR BLD AUTO: 10 %
MCH RBC QN AUTO: 34.5 PG (ref 26.5–33)
MCHC RBC AUTO-ENTMCNC: 35.4 G/DL (ref 31.5–36.5)
MCV RBC AUTO: 97 FL (ref 78–100)
MDC_IDC_EPISODE_DTM: NORMAL
MDC_IDC_EPISODE_DURATION: 0 S
MDC_IDC_EPISODE_DURATION: 0 S
MDC_IDC_EPISODE_DURATION: 1 S
MDC_IDC_EPISODE_DURATION: 1134 S
MDC_IDC_EPISODE_DURATION: 1380 S
MDC_IDC_EPISODE_DURATION: 1381 S
MDC_IDC_EPISODE_DURATION: 1785 S
MDC_IDC_EPISODE_DURATION: 183 S
MDC_IDC_EPISODE_DURATION: 1855 S
MDC_IDC_EPISODE_DURATION: 1881 S
MDC_IDC_EPISODE_DURATION: 1898 S
MDC_IDC_EPISODE_DURATION: 199 S
MDC_IDC_EPISODE_DURATION: 2 S
MDC_IDC_EPISODE_DURATION: 203 S
MDC_IDC_EPISODE_DURATION: 212 S
MDC_IDC_EPISODE_DURATION: 2142 S
MDC_IDC_EPISODE_DURATION: 2147 S
MDC_IDC_EPISODE_DURATION: 250 S
MDC_IDC_EPISODE_DURATION: 268 S
MDC_IDC_EPISODE_DURATION: 3120 S
MDC_IDC_EPISODE_DURATION: 3128 S
MDC_IDC_EPISODE_DURATION: 3324 S
MDC_IDC_EPISODE_DURATION: 338 S
MDC_IDC_EPISODE_DURATION: 3432 S
MDC_IDC_EPISODE_DURATION: 35 S
MDC_IDC_EPISODE_DURATION: 35 S
MDC_IDC_EPISODE_DURATION: 360 S
MDC_IDC_EPISODE_DURATION: 421 S
MDC_IDC_EPISODE_DURATION: 460 S
MDC_IDC_EPISODE_DURATION: 4731 S
MDC_IDC_EPISODE_DURATION: 488 S
MDC_IDC_EPISODE_DURATION: 515 S
MDC_IDC_EPISODE_DURATION: 541 S
MDC_IDC_EPISODE_DURATION: 6055 S
MDC_IDC_EPISODE_DURATION: 6324 S
MDC_IDC_EPISODE_DURATION: 680 S
MDC_IDC_EPISODE_DURATION: 694 S
MDC_IDC_EPISODE_DURATION: 70 S
MDC_IDC_EPISODE_DURATION: 7408 S
MDC_IDC_EPISODE_DURATION: 78 S
MDC_IDC_EPISODE_DURATION: 805 S
MDC_IDC_EPISODE_DURATION: 8341 S
MDC_IDC_EPISODE_DURATION: 8436 S
MDC_IDC_EPISODE_DURATION: 889 S
MDC_IDC_EPISODE_DURATION: 903 S
MDC_IDC_EPISODE_DURATION: 9384 S
MDC_IDC_EPISODE_DURATION: NORMAL S
MDC_IDC_EPISODE_ID: NORMAL
MDC_IDC_EPISODE_TYPE: NORMAL
MDC_IDC_EPISODE_TYPE_INDUCED: NO
MDC_IDC_LEAD_CONNECTION_STATUS: NORMAL
MDC_IDC_LEAD_CONNECTION_STATUS: NORMAL
MDC_IDC_LEAD_IMPLANT_DT: NORMAL
MDC_IDC_LEAD_IMPLANT_DT: NORMAL
MDC_IDC_LEAD_LOCATION: NORMAL
MDC_IDC_LEAD_LOCATION: NORMAL
MDC_IDC_LEAD_LOCATION_DETAIL_1: NORMAL
MDC_IDC_LEAD_LOCATION_DETAIL_1: NORMAL
MDC_IDC_LEAD_MFG: NORMAL
MDC_IDC_LEAD_MFG: NORMAL
MDC_IDC_LEAD_MODEL: NORMAL
MDC_IDC_LEAD_MODEL: NORMAL
MDC_IDC_LEAD_POLARITY_TYPE: NORMAL
MDC_IDC_LEAD_POLARITY_TYPE: NORMAL
MDC_IDC_LEAD_SERIAL: NORMAL
MDC_IDC_LEAD_SERIAL: NORMAL
MDC_IDC_LEAD_SPECIAL_FUNCTION: NORMAL
MDC_IDC_LEAD_SPECIAL_FUNCTION: NORMAL
MDC_IDC_MSMT_BATTERY_DTM: NORMAL
MDC_IDC_MSMT_BATTERY_REMAINING_LONGEVITY: 93 MO
MDC_IDC_MSMT_BATTERY_RRT_TRIGGER: 2.62
MDC_IDC_MSMT_BATTERY_STATUS: NORMAL
MDC_IDC_MSMT_BATTERY_VOLTAGE: 2.98 V
MDC_IDC_MSMT_LEADCHNL_RA_IMPEDANCE_VALUE: 323 OHM
MDC_IDC_MSMT_LEADCHNL_RA_IMPEDANCE_VALUE: 437 OHM
MDC_IDC_MSMT_LEADCHNL_RA_PACING_THRESHOLD_AMPLITUDE: 0.75 V
MDC_IDC_MSMT_LEADCHNL_RA_PACING_THRESHOLD_PULSEWIDTH: 0.4 MS
MDC_IDC_MSMT_LEADCHNL_RA_SENSING_INTR_AMPL: 2 MV
MDC_IDC_MSMT_LEADCHNL_RV_IMPEDANCE_VALUE: 532 OHM
MDC_IDC_MSMT_LEADCHNL_RV_IMPEDANCE_VALUE: 627 OHM
MDC_IDC_MSMT_LEADCHNL_RV_PACING_THRESHOLD_AMPLITUDE: 0.5 V
MDC_IDC_MSMT_LEADCHNL_RV_PACING_THRESHOLD_PULSEWIDTH: 0.4 MS
MDC_IDC_PG_IMPLANT_DTM: NORMAL
MDC_IDC_PG_MFG: NORMAL
MDC_IDC_PG_MODEL: NORMAL
MDC_IDC_PG_SERIAL: NORMAL
MDC_IDC_PG_TYPE: NORMAL
MDC_IDC_SESS_CLINIC_NAME: NORMAL
MDC_IDC_SESS_DTM: NORMAL
MDC_IDC_SESS_TYPE: NORMAL
MDC_IDC_SET_BRADY_AT_MODE_SWITCH_RATE: 171 {BEATS}/MIN
MDC_IDC_SET_BRADY_HYSTRATE: NORMAL
MDC_IDC_SET_BRADY_LOWRATE: 75 {BEATS}/MIN
MDC_IDC_SET_BRADY_MAX_SENSOR_RATE: 130 {BEATS}/MIN
MDC_IDC_SET_BRADY_MAX_TRACKING_RATE: 130 {BEATS}/MIN
MDC_IDC_SET_BRADY_MODE: NORMAL
MDC_IDC_SET_BRADY_PAV_DELAY_LOW: 180 MS
MDC_IDC_SET_BRADY_SAV_DELAY_LOW: 150 MS
MDC_IDC_SET_LEADCHNL_RA_PACING_AMPLITUDE: 1.5 V
MDC_IDC_SET_LEADCHNL_RA_PACING_ANODE_ELECTRODE_1: NORMAL
MDC_IDC_SET_LEADCHNL_RA_PACING_ANODE_LOCATION_1: NORMAL
MDC_IDC_SET_LEADCHNL_RA_PACING_CAPTURE_MODE: NORMAL
MDC_IDC_SET_LEADCHNL_RA_PACING_CATHODE_ELECTRODE_1: NORMAL
MDC_IDC_SET_LEADCHNL_RA_PACING_CATHODE_LOCATION_1: NORMAL
MDC_IDC_SET_LEADCHNL_RA_PACING_POLARITY: NORMAL
MDC_IDC_SET_LEADCHNL_RA_PACING_PULSEWIDTH: 0.4 MS
MDC_IDC_SET_LEADCHNL_RA_SENSING_ANODE_ELECTRODE_1: NORMAL
MDC_IDC_SET_LEADCHNL_RA_SENSING_ANODE_LOCATION_1: NORMAL
MDC_IDC_SET_LEADCHNL_RA_SENSING_CATHODE_ELECTRODE_1: NORMAL
MDC_IDC_SET_LEADCHNL_RA_SENSING_CATHODE_LOCATION_1: NORMAL
MDC_IDC_SET_LEADCHNL_RA_SENSING_POLARITY: NORMAL
MDC_IDC_SET_LEADCHNL_RA_SENSING_SENSITIVITY: 0.3 MV
MDC_IDC_SET_LEADCHNL_RV_PACING_AMPLITUDE: 2 V
MDC_IDC_SET_LEADCHNL_RV_PACING_ANODE_ELECTRODE_1: NORMAL
MDC_IDC_SET_LEADCHNL_RV_PACING_ANODE_LOCATION_1: NORMAL
MDC_IDC_SET_LEADCHNL_RV_PACING_CAPTURE_MODE: NORMAL
MDC_IDC_SET_LEADCHNL_RV_PACING_CATHODE_ELECTRODE_1: NORMAL
MDC_IDC_SET_LEADCHNL_RV_PACING_CATHODE_LOCATION_1: NORMAL
MDC_IDC_SET_LEADCHNL_RV_PACING_POLARITY: NORMAL
MDC_IDC_SET_LEADCHNL_RV_PACING_PULSEWIDTH: 0.4 MS
MDC_IDC_SET_LEADCHNL_RV_SENSING_ANODE_ELECTRODE_1: NORMAL
MDC_IDC_SET_LEADCHNL_RV_SENSING_ANODE_LOCATION_1: NORMAL
MDC_IDC_SET_LEADCHNL_RV_SENSING_CATHODE_ELECTRODE_1: NORMAL
MDC_IDC_SET_LEADCHNL_RV_SENSING_CATHODE_LOCATION_1: NORMAL
MDC_IDC_SET_LEADCHNL_RV_SENSING_POLARITY: NORMAL
MDC_IDC_SET_LEADCHNL_RV_SENSING_SENSITIVITY: 0.9 MV
MDC_IDC_SET_ZONE_DETECTION_INTERVAL: 350 MS
MDC_IDC_SET_ZONE_DETECTION_INTERVAL: 400 MS
MDC_IDC_SET_ZONE_STATUS: NORMAL
MDC_IDC_SET_ZONE_STATUS: NORMAL
MDC_IDC_SET_ZONE_TYPE: NORMAL
MDC_IDC_SET_ZONE_VENDOR_TYPE: NORMAL
MDC_IDC_STAT_AT_BURDEN_PERCENT: 10.3 %
MDC_IDC_STAT_AT_DTM_END: NORMAL
MDC_IDC_STAT_AT_DTM_START: NORMAL
MDC_IDC_STAT_BRADY_AP_VP_PERCENT: 87.08 %
MDC_IDC_STAT_BRADY_AP_VS_PERCENT: 0.43 %
MDC_IDC_STAT_BRADY_AS_VP_PERCENT: 11.53 %
MDC_IDC_STAT_BRADY_AS_VS_PERCENT: 0.95 %
MDC_IDC_STAT_BRADY_DTM_END: NORMAL
MDC_IDC_STAT_BRADY_DTM_START: NORMAL
MDC_IDC_STAT_BRADY_RA_PERCENT_PACED: 79.53 %
MDC_IDC_STAT_BRADY_RV_PERCENT_PACED: 98.41 %
MDC_IDC_STAT_EPISODE_RECENT_COUNT: 0
MDC_IDC_STAT_EPISODE_RECENT_COUNT: 0
MDC_IDC_STAT_EPISODE_RECENT_COUNT: 2
MDC_IDC_STAT_EPISODE_RECENT_COUNT: 497
MDC_IDC_STAT_EPISODE_RECENT_COUNT: 64
MDC_IDC_STAT_EPISODE_RECENT_COUNT_DTM_END: NORMAL
MDC_IDC_STAT_EPISODE_RECENT_COUNT_DTM_START: NORMAL
MDC_IDC_STAT_EPISODE_TOTAL_COUNT: 0
MDC_IDC_STAT_EPISODE_TOTAL_COUNT: 403
MDC_IDC_STAT_EPISODE_TOTAL_COUNT: 5704
MDC_IDC_STAT_EPISODE_TOTAL_COUNT: 815
MDC_IDC_STAT_EPISODE_TOTAL_COUNT: NORMAL
MDC_IDC_STAT_EPISODE_TOTAL_COUNT_DTM_END: NORMAL
MDC_IDC_STAT_EPISODE_TOTAL_COUNT_DTM_START: NORMAL
MDC_IDC_STAT_EPISODE_TYPE: NORMAL
MDC_IDC_STAT_TACHYTHERAPY_RECENT_DTM_END: NORMAL
MDC_IDC_STAT_TACHYTHERAPY_RECENT_DTM_START: NORMAL
MDC_IDC_STAT_TACHYTHERAPY_TOTAL_DTM_END: NORMAL
MDC_IDC_STAT_TACHYTHERAPY_TOTAL_DTM_START: NORMAL
MONOCYTES # BLD AUTO: 0.5 10E3/UL (ref 0–1.3)
MONOCYTES NFR BLD AUTO: 9 %
NEUTROPHILS # BLD AUTO: 4.3 10E3/UL (ref 1.6–8.3)
NEUTROPHILS NFR BLD AUTO: 79 %
NRBC # BLD AUTO: 0 10E3/UL
NRBC BLD AUTO-RTO: 0 /100
PLATELET # BLD AUTO: 132 10E3/UL (ref 150–450)
POTASSIUM SERPL-SCNC: 3.8 MMOL/L (ref 3.4–5.3)
PROT SERPL-MCNC: 8.2 G/DL (ref 6.4–8.3)
RBC # BLD AUTO: 4.69 10E6/UL (ref 3.8–5.9)
SODIUM SERPL-SCNC: 136 MMOL/L (ref 135–145)
WBC # BLD AUTO: 5.4 10E3/UL (ref 4–11)

## 2025-04-02 PROCEDURE — 36415 COLL VENOUS BLD VENIPUNCTURE: CPT | Performed by: PATHOLOGY

## 2025-04-02 PROCEDURE — 80053 COMPREHEN METABOLIC PANEL: CPT | Performed by: PATHOLOGY

## 2025-04-02 PROCEDURE — 99213 OFFICE O/P EST LOW 20 MIN: CPT | Performed by: NURSE PRACTITIONER

## 2025-04-02 PROCEDURE — 85652 RBC SED RATE AUTOMATED: CPT | Performed by: PATHOLOGY

## 2025-04-02 PROCEDURE — 93280 PM DEVICE PROGR EVAL DUAL: CPT | Performed by: INTERNAL MEDICINE

## 2025-04-02 PROCEDURE — 3074F SYST BP LT 130 MM HG: CPT | Performed by: NURSE PRACTITIONER

## 2025-04-02 PROCEDURE — 86140 C-REACTIVE PROTEIN: CPT | Performed by: PATHOLOGY

## 2025-04-02 PROCEDURE — 1126F AMNT PAIN NOTED NONE PRSNT: CPT | Performed by: NURSE PRACTITIONER

## 2025-04-02 PROCEDURE — 99214 OFFICE O/P EST MOD 30 MIN: CPT | Mod: 25 | Performed by: NURSE PRACTITIONER

## 2025-04-02 PROCEDURE — 85025 COMPLETE CBC W/AUTO DIFF WBC: CPT | Performed by: PATHOLOGY

## 2025-04-02 PROCEDURE — 3079F DIAST BP 80-89 MM HG: CPT | Performed by: NURSE PRACTITIONER

## 2025-04-02 ASSESSMENT — PAIN SCALES - GENERAL: PAINLEVEL_OUTOF10: NO PAIN (0)

## 2025-04-02 NOTE — NURSING NOTE
Chief Complaint   Patient presents with    Follow Up     Return CORE ; 64 year old with chronic diastolic heart failure presents for follow up with labs prior       Vitals were taken, medications reconciled     Dima Phelan, Clinic Assistant     12:16 PM

## 2025-04-02 NOTE — PATIENT INSTRUCTIONS
CORE: Cardiomyopathy, Optimization, Rehabilitation, Education      Take your medicines every day, as directed    Changes/Recommendations made today:  Update ECHO in setting of increased RV pacing     Monitor Your Weight and Symptoms    Contact us if you:    Gain 2 pounds in one day or 5 pounds in one week  Feel more short of breath  Notice more leg swelling  Feel lightheadeded   Change your lifestyle    Limit Salt or Sodium:  2000 mg  Limit Fluids:  2000 mL or approximately 64 ounces  Eat a Heart Healthy Diet  Low in saturated fats  Stay Active:  Aim to move at least 150 minutes every  week         To Contact us    During Business Hours:  [phone], option # 1      After hours, weekends or holidays:   [phone], Option #4  Ask to speak to the On-Call Cardiologist. Inform them you are a CORE/heart failure patient at the [facility].     Use [employer] allows you to communicate directly with your heart team through secure messaging.  [employer] can be accessed any time on your phone, computer, or tablet.  If you need assistance, we'd be happy to help!         Keep your Heart Appointments:    ECHO at your soonest availability    CORE in 6 months

## 2025-04-02 NOTE — LETTER
4/2/2025      RE: Amelia Michel  [address]  [facility] [address]       Dear Colleague,    Thank you for the opportunity to participate in the care of your patient, Amelia Michel, at the [facility] [place] at [facility]. Please see a copy of my visit note below.      [facility] Cardiology   CORE Clinic      HPI:   Ms. 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     With regards to her cardiac conditions, as noted, Ms. Michel presented to [facility] on 11/19/22 with MONTALVO, BLE edema, and 8 pound weight gain over the course of one week. Chest X-ray was suspicious for pulmonary edema, NT-P BNP 1,913, Troponin T 21-->12. EDW per chart review 116-120; admission weight 128. She was admitted w/ decompensated diastolic heart failure with pictutre c/b frequent non sustained ventricular tachycardia with RHC on 11/28/22 notable for cardiogenic shock with a cardiac index of 1.2 and CO of 1.6. Etiology thought to most likely secondary to frequent non sustained ventricular tachycardia in the setting of possible restrictive/constrictive physiology given her malignancy history (albeit presumably in remission). CAD ruled on by coronary angiogram completed on 11/28/22. She underwent VT ablation on 12/1/22 and had successful resumption of her home medications with increased diuretic dosing and adddition of an SGLT2 inhibitor. She was diuresed 19 lbs to a weight of 109 lbs at time of discharge.     At CORE visit on 1/9/23 Ms. Michel was hypervolemic as  evident by weight gain,  tachycardia, and peripheral edema. Review of labs demonstrated persistent hyponatremia with Na 127 and bump in Cr to 1.35. Lasix was increase 60 mg BID. Still awaiting for PA for cardiomems device. Admitted 1/24-1/29 for CHF exacerbation. Diuresed and switched to torsemide 40 mg BID. Discharged at a weight of 107 lb.     Device interrogation from 2/7 with rates 120s-140, AFL. Diltiazem increased to 240 mg daily. Since our last CORE visit in 2/2023 she has resumed torsemide 20 mg BID and spironolactone 12.5 mg daily. She was evaluated by Dr. Cope for severe TR. Admitted 4/10-4/17/23 for elective TVR with Dr. Cope.     CORE visit in 5/2023, Ms. Michel was feeling well. Weights are very stable at 103-104 lbs. Echo at that time showed preserved LVEF and mildly improved RV function. Diltiazem was increased to 180 mg daily for elevated heart rates. Started on spironolactone. CORE visit 9/2023 feeling well. Weight stable at 108 lb. No medication changes.   Underwent hip surgery in January 2024.     Ms. Michel is doing well today. Her joints are aching more with the precipitation; however, she is not limited by cardiac concerns and MONTALVO. No SOB at rest. She is still walking 15 min twice a day in her driveway but just has to stop and start more due to joint pain. Weights have been stable and she was 122.5 this am. Amelia communicated the desire to make some diet adjustments and cut down on the sweets post holidays. She has not noticed any increases in fluid weight as of late but in the past she has felt it in her belly. Denies orthopnea/PND, lightheadedness, palpitations, chest discomfort, or pain. She takes her BP regularly at home where it is typically in the 110s over mid 70s.     Cardiac Medications:   - ASA 81 mg daily  - Eliquis 5 mg BID  - Diltiazem 240 mg daily   - Jardiance 10 mg daily   - Torsemide 20 mg daily  - KCL 60 mEq daily    - Spironolactone 12.5 mg daily      PAST MEDICAL HISTORY:  Past Medical  History:   Diagnosis Date     Acute on chronic diastolic heart failure (H) 11/19/2022     Antiplatelet or antithrombotic long-term use      Arrhythmia      Arthritis      Atrial fibrillation (H)      Atrial flutter (H)      Bradycardia 12/12/2019    Added automatically from request for surgery [id number]       Cancer (H) June 2017    DLBCL currently in remission     Cardiac arrest (H) 2017     Chronic kidney disease      Congestive heart failure (H)      Diffuse large B-cell lymphoma of extranodal site 2017     Edema, unspecified type 01/24/2023     Extremity restricted from procedure     Left arm     Fall 12/06/2023    Cut on head     H/O blood transfusion reaction     Hives with platelet transfusion, needs pre-medication with tylenol and benadryl     Heart murmur      History of blood clots 2017    PICC line Right arm      History of chemotherapy      History of transfusion      Hypertension      Hypervolemia, unspecified hypervolemia type 01/24/2023     Lymphedema of both lower extremities     wears lymphedema socks     Neuropathy     Feet and hands     NSVT (nonsustained ventricular tachycardia) (H) 12/2022     Pacemaker 2019     Pericardial effusion 2017     Physical deconditioning      PONV (postoperative nausea and vomiting)      Positive PPD, 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     Prediabetes      Pulmonary hypertension (H)      RA (rheumatoid arthritis) (H)      SSS (sick sinus syndrome) (H) 2019    Pacemaker     Steroid-induced osteoporosis      Thrombocytopenia      VSD (ventricular septal defect)        FAMILY HISTORY:  Family History   Problem Relation Age of Onset     Breast Cancer Mother         60s     Hypertension Mother      Alzheimer Disease Mother      Cerebrovascular Disease Mother      Hypertension Father      Cerebrovascular Disease Father      Atrial fibrillation Father      Lymphoma Father      Prostate Cancer Father      Other Cancer Father         some form of Lymphoma     Alzheimer Disease Maternal Grandmother          likely     Arthritis Maternal Grandfather      Pneumonia Maternal Grandfather      Diabetes Paternal Grandmother      Mental Illness Sister         early onset  alzheimers       SOCIAL HISTORY:  Social History     Socioeconomic History     Marital status:      Spouse name: Romeo Michel     Number of children: 0   Occupational History     Employer: [employer]   Tobacco Use     Smoking status: Never     Smokeless tobacco: Never   Substance and Sexual Activity     Alcohol use: Not Currently     Comment: none     Drug use: No   Other Topics Concern     Parent/sibling w/ CABG, MI or angioplasty before 65F 55M? No     Social Determinants of Health     Intimate Partner Violence: Not At Risk     Fear of Current or Ex-Partner: No     Emotionally Abused: No     Physically Abused: No     Sexually Abused: No       CURRENT MEDICATIONS:  Current Outpatient Medications   Medication Sig Dispense Refill     Abatacept (ORENCIA) 125 MG/ML SOSY pre-filled syringe Inject 1 mL (125 mg) subcutaneously once a week. 4 mL 5     acetaminophen (TYLENOL) 325 MG tablet Take 3 tablets (975 mg) by mouth every 8 hours       aspirin (ASA) 81 MG chewable tablet 1 tablet (81 mg) by Oral or NG Tube route daily 30 tablet 2     calcium carbonate 600 mg-vitamin D 400 units (CALTRATE) 600-400 MG-UNIT per tablet Take 2 tablets by mouth daily       dexAMETHasone (DECADRON) 4 MG/ML injection Inject 4 mg for adrenal crisis 2 mL 1     diltiazem ER (DILT-XR) 240 MG 24 hr ER beaded capsule Take 1 capsule (240 mg) by mouth daily. 90 capsule 3     docusate sodium (COLACE) 100 MG capsule Take 100 mg by mouth 2 times daily as needed for constipation       ELIQUIS ANTICOAGULANT 5 MG tablet TAKE 1 TABLET(5 MG) BY MOUTH TWICE DAILY 180 tablet 3     empagliflozin (JARDIANCE) 10 MG TABS tablet Take 1 tablet (10 mg) by mouth daily. 90 tablet 3     gabapentin (NEURONTIN) 100 MG capsule Take 1 capsule (100 mg) by mouth 2 times daily AND  2 capsules (200 mg) at bedtime. 360 capsule 3     hydroxychloroquine (PLAQUENIL) 200 MG tablet Take 1 tablet (200 mg) by mouth daily. 90 tablet 1     loratadine (CLARITIN) 10 MG tablet Take 10 mg by mouth daily as needed.       magnesium oxide 200 MG TABS Take 200 mg by mouth daily bedtime       potassium chloride timmy ER (KLOR-CON M20) 20 MEQ CR tablet Take 3 tablets (60 mEq) by mouth daily 270 tablet 3     predniSONE (DELTASONE) 1 MG tablet Take 3 tablets (3 mg) by mouth daily - Oral 270 tablet 3     predniSONE (DELTASONE) 10 MG tablet Take 1 tablet (10 mg) by mouth daily. 20 tablet 1     spironolactone (ALDACTONE) 25 MG tablet Take 0.5 tablets (12.5 mg) by mouth daily. 45 tablet 3     sulfamethoxazole-trimethoprim (BACTRIM DS) 800-160 MG tablet Take 1 tablet by mouth 2 times daily 4 tablet 5     torsemide (DEMADEX) 20 MG tablet Take 1 tablet (20 mg) by mouth daily. 90 tablet 3     Vitamin D (Cholecalciferol) 10 MCG (400 UNIT) TABS Take 1 tablet by mouth daily       zoledronic acid (RECLAST) 5 MG/100ML infusion 5 mg every year.       No current facility-administered medications for this visit.       ROS:   Refer to HPI    EXAM:  /82 (BP Location: Right arm, Patient Position: Chair, Cuff Size: Adult Regular)   Pulse 91   Wt 56.5 kg (124 lb 9.6 oz)   SpO2 95%   BMI 26.96 kg/m      GENERAL: Appears comfortable, in no acute distress.   HEENT: Eye symmetrical, no discharge or icterus bilaterally. Mucous membranes moist and without lesions.  CV: Normal rate and rhythm, +S1S2, systolic murmur, no rub, or gallop. JVP lower third of neck.    RESPIRATORY: Respirations regular, even, and unlabored. Lungs CTA throughout.   GI: Soft and non distended with normoactive bowel sounds present in all quadrants. No tenderness, rebound, guarding. No hepatomegaly.   EXTREMITIES: No peripheral edema. Extremities warm and well perfused.    NEUROLOGIC: Alert and oriented x 3. No focal deficits.   MUSCULOSKELETAL: No joint  swelling or tenderness. Joint pain with movement alleviated by short periods of rest.   SKIN: No jaundice. No rashes or lesions.     Labs, reviewed with patient in clinic today:  CBC RESULTS:  Lab Results   Component Value Date    WBC 5.4 04/02/2025    WBC Canceled, Test credited 03/16/2021    RBC 4.69 04/02/2025    RBC Canceled, Test credited 03/16/2021    HGB 16.2 04/02/2025    HGB Canceled, Test credited 03/16/2021    HCT 45.7 04/02/2025    HCT Canceled, Test credited 03/16/2021    MCV 97 04/02/2025    MCV Canceled, Test credited 03/16/2021    MCH 34.5 (H) 04/02/2025    MCH Canceled, Test credited 03/16/2021    MCHC 35.4 04/02/2025    MCHC Canceled, Test credited 03/16/2021    RDW 11.9 04/02/2025    RDW Canceled, Test credited 03/16/2021     (L) 04/02/2025    PLT Canceled, Test credited 03/16/2021       CMP RESULTS:  Lab Results   Component Value Date     04/02/2025     (L) 11/29/2022     06/23/2021    POTASSIUM 3.8 04/02/2025    POTASSIUM 3.5 04/10/2023    POTASSIUM 3.9 07/20/2022    POTASSIUM 4.2 06/23/2021    CHLORIDE 97 (L) 04/02/2025    CHLORIDE 101 07/20/2022    CHLORIDE 102 06/23/2021    CO2 25 04/02/2025    CO2 28 07/20/2022    CO2 28 06/23/2021    ANIONGAP 14 04/02/2025    ANIONGAP 7 07/20/2022    ANIONGAP 6 06/23/2021     (H) 04/02/2025     (H) 01/24/2024     (H) 07/20/2022    GLC 98 06/23/2021    BUN 30.6 (H) 04/02/2025    BUN 31 (H) 07/20/2022    BUN 25 06/23/2021    CR 1.11 04/02/2025    CR 1.05 (H) 06/23/2021    GFRESTIMATED 55 (L) 04/02/2025    GFRESTIMATED 57 (L) 06/23/2021    GFRESTBLACK 67 06/23/2021    VIKTORIYA 10.1 04/02/2025    VIKTORIYA 10.0 06/23/2021    BILITOTAL 0.8 04/02/2025    BILITOTAL 1.2 06/23/2021    ALBUMIN 4.8 04/02/2025    ALBUMIN 4.4 04/20/2022    ALBUMIN 4.2 06/23/2021    ALKPHOS 96 04/02/2025    ALKPHOS 136 06/23/2021    ALT 20 04/02/2025    ALT 41 06/23/2021    AST 36 04/02/2025    AST 36 06/23/2021        INR RESULTS:  Lab Results    Component Value Date    INR 1.71 (H) 04/10/2023    INR 3.42 (H) 03/07/2018       Lab Results   Component Value Date    MAG 2.0 04/16/2023    MAG 1.9 04/12/2018     Lab Results   Component Value Date    NTBNPI 2,067 (H) 01/24/2023    NTBNPI 485 12/12/2019     Lab Results   Component Value Date    NTBNP 1,425 (H) 10/13/2021    NTBNP 1,274 (H) 05/26/2021       Cardiac Diagnostics:    Cardiac Device Check 4/2/2025  Device: Medtronic [id number] [place] XT DR GALLEGOS  Normal device function.   Mode: DDDR  bpm  AP: 79.5%  : 98.4%  Intrinsic Rhythm: CHB- AS @ 66/  @ 30 bpm with frequent PVCs  Short V-V intervals: 0  Lead Trends Appear Stable.   Estimated battery longevity to RRT = 7.6 years. Battery voltage = 2.98 V.    Atrial Arrhythmia: 110 AT/AFL episodes recorded  AF [place]: 10.3%  Anticoagulant: Apixaban  Ventricular Arrhythmia: 11 NSVT lasting 2-3 seconds 158-207 bpm  Setting Changes: None    5/24/2023 Echo   Interpretation Summary  Tricuspid valve replacement with 29mm [place] Epic.  Doppler interrogation of the tricspid valve is normal.  Global and regional left ventricular function is normal with an EF of 55-60%.  The right ventricle is normal size. Global right ventricular function is  mildly to moderately reduced.  Severe biatrial enlargement is present.  Mild to moderate mitral insufficiency is present.  IVC diameter >2.1 cm collapsing <50% with sniff suggests a high RA pressure  estimated at 15 mmHg or greater.  No pericardial effusion is present.  Compared to prior, RV appears slightly better, CVP is higher now.    4/17/2023 Echo   Interpretation Summary  Minimally invasive tricuspid valve replacement with 29mm [place] Epic. Doppler  interrogation of the tricspid valve is normal. TV mean gradient 4-6 mmHg.  Moderate right ventricular dilation is present. Global right ventricular  function is mildly to moderately reduced.  Global and regional left ventricular function is normal with an EF of 60-65%.  Mild to  moderate mitral insufficiency is present.  IVC diameter <2.1 cm collapsing >50% with sniff suggests a normal RA pressure  of 3 mmHg.  No pericardial effusion is present.  Compared to prior TVR is new.    2/7/2023 Device Interrogation   Device: [employer]tronic [id number] [place] XT DR GALLEGOS  Normal Device Function.  Mode: VVIR  bpm  : 8.6%  Presenting EGM: Narrow Complex tachycardia @ 139 bpm  Short V-V intervals: 0  Lead Trends Appear Stable.  Estimated battery longevity to RRT = 11 years. Battery voltage = 3.02 V.  Atrial Arrhythmia: AFL  Anticoagulant: Apixaban  Ventricular Arrhythmia: 4 episodes lasting 1-4 sec 194-245 bpm  Transmission discussed with Maria Esther Cutler NP. Orders to increase diltiazem from 180 mg daily to 240 mg daily.    12/5/2022 ECHO  Interpretation Summary  Severe tricuspid insufficiency is present.  Moderate right ventricular dilation is present. Global right ventricular  function is mildly reduced.  Global and regional left ventricular function is normal with an EF of 55-60%.  IVC diameter >2.1 cm collapsing <50% with sniff suggests a high RA pressure  estimated at 15 mmHg or greater.  No pericardial effusion is present.  No significant changes noted.    11/28/2022 RHC with coronary angiogram   Right sided filling pressures are severely elevated.  Mild elevated pulmonary hypertension.  Left sided filling pressures are moderately elevated.  Reduced cardiac output level.  Hemodynamic data has been modified in Epic per physician review.  Prox LAD lesion is 30% stenosed.     Mild non-obstructive coronary artery disease        Assessment and Plan:   Ms. Michel is a 64 year old female with medical history pertinent for HFpEF, mildly dilated and reduced RV function, severe TR (2/2 failure of leaflet coaptation) s/p TVR 4/10/23, atrial flutter/fibrillation (on apixaban and rate control strategy), VT ablation in 11/2022, cardiac arrest (2017 likely 2/2 malignant involvement of the pericardium c/b organizing  pericardial effusion), SSS s/p ppm (2019), VSD (s/p repair 1969), and DLBCL (s/p CHOP therapy and in remission). Presents today for CORE follow-up.     Overall, appearing well. Euvolemic by exam. Review of today's labs demonstrate stable renal function (Cr trending down) and normal lytes. Pt knows to improve diet and looks forward to increased mobility in the warmer months. No medication changes. Doesn't require refills at this time. Echo reordered to evaluate EF in consideration of increased R ventricular pacing. Depending on echo results might consider CRT-D.     # Acute on chronic diastolic heart failure with dilated and mildly reduced RV function (LVEF 55-60%)  Pre-Ablation: 11/28/22 RHC: CVP 24, PA 48/27/34, PCWP 23, SVO2 46, CI 1.1, CO 1.6  Post Ablation and Diuresis: 12/3/22 RHC: CVP 16, PA 41/22/28, PCWP 15, SVO2 69, CI 2.1, CO 3.0    NYHA Class II, AHA/ACC Stage C  Rate control: 91, continue diltiazem   volume status: euvolemic, continue torsemide 20 mg daily    Blood pressure control:  Diltiazem 240 mg daily   Aldosterone antagonist: Aldactone 12.5 mg daily   SGLT2:  Empagliflozin 10 mg daily   Evaluation of coronary arteries: 11/28/22 angiogram mild non-obstructive CAD     # Frequent, non-sustained ventricular tachycardia s/p VT Ablation (by Dr. Eaton on 12/1/22)  - Anti-arrhythmic: none, s/p VT ablation  - Stopped PTA digoxin per EP recommendations  - Anticoagulation: given history of Afib, continue Apixaban 5 mg bid  - Follow up with EP specialists (Dr. Eaton and/or Maria Esther Cutler NP)    # HTN Intolerant to Metoprolol in the past.   - Diltiazem 240 mg daily, spironolactone 12.5 mg daily       # Aflutter. History of SSS s/p PPM 12/19.   Long standing history of atrial arrythmias.  - Diltiazem 240 mg daily  - Continue Eliquis.   - update echo given high V pacing percentage, may need to consider upgrade to CRT  - Follow up with EP annually.       # VSD s/p repair.     # History of exudative pericardial  effusion.   - Stable per CT 3/19.     # Severe TR per TTE 11/2022 s/p TVR on 4/10/23  - continue ASA 81 mg daily         Follow up:  - Reschedule echo, need to reassess EF given high V pacing percentages   - CORE 6 months      Janie Molina DNP-FNP Student     I was present with the NP student who participated in the care and documentation of the services provided. I have verified the history and personally performed the physical exam and medical decision making, as documented by the student and edited by me.     Elmira Vasquez DNP, NP-C  Advanced Heart Failure  4/2/2025      Please do not hesitate to contact me if you have any questions/concerns.     Sincerely,     ELIZABETH Mcgee CNP

## 2025-04-08 ENCOUNTER — TELEPHONE (OUTPATIENT)
Dept: RHEUMATOLOGY | Facility: CLINIC | Age: 65
End: 2025-04-08
Payer: COMMERCIAL

## 2025-04-08 NOTE — TELEPHONE ENCOUNTER
PA Initiation    Medication: ORENCIA CLICKJECT 125 MG/ML SC SOAJ  Insurance Company: [employer] Non-Specialty PA's - Phone [phone] Fax [phone]  Pharmacy Filling the Rx:    Filling Pharmacy Phone:    Filling Pharmacy Fax:    Start Date: 4/8/2025        (Key: [id number])

## 2025-04-10 ENCOUNTER — OFFICE VISIT (OUTPATIENT)
Dept: FAMILY MEDICINE | Facility: CLINIC | Age: 65
End: 2025-04-10
Payer: COMMERCIAL

## 2025-04-10 VITALS
BODY MASS INDEX: 26.88 KG/M2 | HEART RATE: 78 BPM | TEMPERATURE: 97.5 F | SYSTOLIC BLOOD PRESSURE: 110 MMHG | RESPIRATION RATE: 16 BRPM | HEIGHT: 57 IN | WEIGHT: 124.6 LBS | DIASTOLIC BLOOD PRESSURE: 64 MMHG | OXYGEN SATURATION: 94 %

## 2025-04-10 DIAGNOSIS — Z95.4 S/P TVR (TRICUSPID VALVE REPLACEMENT): ICD-10-CM

## 2025-04-10 DIAGNOSIS — M06.9 RHEUMATOID ARTHRITIS OF BOTH ANKLES, UNSPECIFIED WHETHER RHEUMATOID FACTOR PRESENT (H): Chronic | ICD-10-CM

## 2025-04-10 DIAGNOSIS — I48.0 PAROXYSMAL ATRIAL FIBRILLATION (H): Chronic | ICD-10-CM

## 2025-04-10 DIAGNOSIS — H25.9 AGE-RELATED CATARACT OF BOTH EYES, UNSPECIFIED AGE-RELATED CATARACT TYPE: Primary | ICD-10-CM

## 2025-04-10 DIAGNOSIS — Z01.818 PREOP GENERAL PHYSICAL EXAM: ICD-10-CM

## 2025-04-10 ASSESSMENT — PAIN SCALES - GENERAL: PAINLEVEL_OUTOF10: MILD PAIN (3)

## 2025-04-10 NOTE — PATIENT INSTRUCTIONS
How to Take Your Medication Before Surgery  Preoperative Medication Instructions   Antiplatelet or Anticoagulation Medication Instructions   - Bleeding risk is low for this procedure (e.g. dental, skin, cataract).    Additional Medication Instructions  Take all scheduled medications on the day of surgery       Patient Education   Preparing for Your Surgery  For Adults  Getting started  In most cases, a nurse will call to review your health history and instructions. They will give you an arrival time based on your scheduled surgery time. Please be ready to share:  Your doctor's clinic name and phone number  Your medical, surgical, and anesthesia history  A list of allergies and sensitivities  A list of medicines, including herbal treatments and over-the-counter drugs  Whether the patient has a legal guardian (ask how to send us the papers in advance)  Note: You may not receive a call if you were seen at our PAC (Preoperative Assessment Center).  Please tell us if you're pregnant--or if there's any chance you might be pregnant. Some surgeries may injure a fetus (unborn baby), so they require a pregnancy test. Surgeries that are safe for a fetus don't always need a test, and you can choose whether to have one.   Preparing for surgery  Within 10 to 30 days of surgery: Have a pre-op exam (sometimes called an H&P, or History and Physical). This can be done at a clinic or pre-operative center.  If you're having a , you may not need this exam. Talk to your care team.  At your pre-op exam, talk to your care team about all medicines you take. (This includes CBD oil and any drugs, such as THC, marijuana, and other forms of cannabis.) If you need to stop any medicine before surgery, ask when to start taking it again.  This is for your safety. Many medicines and drugs can make you bleed too much during surgery. Some change how well surgery (anesthesia) drugs work.  Call your insurance company to let them know you're  having surgery. (If you don't have insurance, call [phone].)  Call your clinic if there's any change in your health. This includes a scrape or scratch near the surgery site, or any signs of a cold (sore throat, runny nose, cough, rash, fever).  Eating and drinking guidelines  For your safety: Unless your surgeon tells you otherwise, follow the guidelines below.  Eat and drink as normal until 8 hours before you arrive for surgery. After that, no food or milk. You can spit out gum when you arrive.  Drink clear liquids until 2 hours before you arrive. These are liquids you can see through, like water, Gatorade, and Propel Water. They also include plain black coffee and tea (no cream or milk).  No alcohol for 24 hours before you arrive. The night before surgery, stop any drinks that contain THC.  If your care team tells you to take medicine on the morning of surgery, it's okay to take it with a sip of water. No other medicines or drugs are allowed (including CBD oil)--follow your care team's instructions.  If you have questions the day of surgery, call your hospital or surgery center.   Preventing infection  Shower or bathe the night before and the morning of surgery. Follow the instructions your clinic gave you. (If no instructions, use regular soap.)  Don't shave or clip hair near your surgery site. We'll remove the hair if needed.  Don't smoke or vape the morning of surgery. No chewing tobacco for 6 hours before you arrive. A nicotine patch is okay. You may spit out nicotine gum when you arrive.  For some surgeries, the surgeon will tell you to fully quit smoking and nicotine.  We will make every effort to keep you safe from infection. We will:  Clean our hands often with soap and water (or an alcohol-based hand rub).  Clean the skin at your surgery site with a special soap that kills germs.  Give you a special gown to keep you warm. (Cold raises the risk of infection.)  Wear hair covers, masks, gowns, and  gloves during surgery.  Give antibiotic medicine, if prescribed. Not all surgeries need this medicine.  What to bring on the day of surgery  Photo ID and insurance card  Copy of your health care directive, if you have one  Glasses and hearing aids (bring cases)  You can't wear contacts during surgery  Inhaler and eye drops, if you use them (tell us about these when you arrive)  CPAP machine or breathing device, if you use them  A few personal items, if spending the night  If you have . . .  A pacemaker, ICD (cardiac defibrillator), or other implant: Bring the ID card.  An implanted stimulator: Bring the remote control.  A legal guardian: Bring a copy of the certified (court-stamped) guardianship papers.  Please remove any jewelry, including body piercings. Leave jewelry and other valuables at home.  If you're going home the day of surgery  You must have a responsible adult drive you home. They should stay with you overnight as well.  If you don't have someone to stay with you, and you aren't safe to go home alone, we may keep you overnight. Insurance often won't pay for this.  After surgery  If it's hard to control your pain or you need more pain medicine, please call your surgeon's office.  Questions?   If you have any questions for your care team, list them here:   ____________________________________________________________________________________________________________________________________________________________________________________________________________________________________________________________  For informational purposes only. Not to replace the advice of your health care provider. Copyright   2003, 2019 [facility]. All rights reserved. Clinically reviewed by Emery Goodman MD. [employer] [id number] - REV 08/24.

## 2025-04-10 NOTE — PROGRESS NOTES
Preoperative Evaluation  [facility]  [address]  [facility] [address]  Phone: [phone]  Primary Provider: Gala Stringer PA-C  Pre-op Performing Provider: Jesus Nowak MD  Apr 10, 2025             4/9/2025   Surgical Information   What procedure is being done? Left Cataract   Facility or Hospital where procedure/surgery will be performed: [facility]   Who is doing the procedure / surgery? Dr. Samayoa   Date of surgery / procedure: April 23   Time of surgery / procedure: Unknown at this time   Where do you plan to recover after surgery? at home with family     Fax number for surgical facility: [phone]    Assessment & Plan     The proposed surgical procedure is considered LOW risk.    Age-related cataract of both eyes, unspecified age-related cataract type    Anemia history  hgb 16.2   Preop general physical exam  Has nausea with anesthesia     Rheumatoid arthritis of both ankles, unspecified whether rheumatoid factor present (H)  On 3 mg of prednisone daily.  Has dexamethasone available if has adrenal crisis.  Has never had to use it. And likely not with this procedure.     Paroxysmal atrial fibrillation (H)      S/P TVR (tricuspid valve replacement)  Good control.  Continue currant medications, continue to monitor.    Tricuspid valve replacement with 29mm St Silvio Epic     Implanted Device    Cardiac Device Check 4/2/2025  Device: Medtronic [id number] Claudia XT DR GALLEGOS  Normal device function.   Mode: DDDR  bpm  AP: 79.5%  : 98.4%  Intrinsic Rhythm: CHB- AS @ 66/  @ 30 bpm with frequent PVCs  Short V-V intervals: 0  Lead Trends Appear Stable.   Estimated battery longevity to RRT = 7.6 years. Battery voltage = 2.98 V.    Atrial Arrhythmia: 110 AT/AFL episodes recorded  AF [place]: 10.3%  Anticoagulant: Apixaban  Ventricular Arrhythmia: 11 NSVT lasting 2-3 seconds 158-207 bpm  Setting Changes: None     - No identified additional risk factors other than  previously addressed         Recommendation  Approval given to proceed with proposed procedure, without further diagnostic evaluation.    Allen Cisneros is a 64 year old, presenting for the following:  Pre-Op Exam          4/10/2025    10:10 AM   Additional Questions   Roomed by Elda Caraballo CMA   Accompanied by Self     HPI:           4/9/2025   Pre-Op Questionnaire   Have you ever had a heart attack or stroke? No   Have you ever had surgery on your heart or blood vessels, such as a stent placement, a coronary artery bypass, or surgery on an artery in your head, neck, heart, or legs? (!) YES    Do you have chest pain with activity? No   Do you have a history of heart failure? (!) YES  Interpretation Summary  Tricuspid valve replacement with 29mm St Silvio Epic.  Doppler interrogation of the tricspid valve is normal.  Global and regional left ventricular function is normal with an EF of 55-60%.  The right ventricle is normal size. Global right ventricular function is  mildly to moderately reduced.  Severe biatrial enlargement is present.  Mild to moderate mitral insufficiency is present.  IVC diameter >2.1 cm collapsing <50% with sniff suggests a high RA pressure  estimated at 15 mmHg or greater.  No pericardial effusion is present.  Compared to prior, RV appears slightly better, CVP is higher now.   Do you currently have a cold, bronchitis or symptoms of other infection? No   Do you have a cough, shortness of breath, or wheezing? No   Do you or anyone in your family have previous history of blood clots? (!) YES on eliquis   Do you or does anyone in your family have a serious bleeding problem such as prolonged bleeding following surgeries or cuts? (!) YES    Have you ever had problems with anemia or been told to take iron pills? (!) YES hgb checked today   Have you had any abnormal blood loss such as black, tarry or bloody stools, or abnormal vaginal bleeding? No   Have you ever had a blood transfusion? (!)  YES   Have you ever had a transfusion reaction? (!) YES    Are you willing to have a blood transfusion if it is medically needed before, during, or after your surgery? Yes   Have you or any of your relatives ever had problems with anesthesia? (!) YES -nausea.  Does well with zofran   Do you have sleep apnea, excessive snoring or daytime drowsiness? No   Do you have any artifical heart valves or other implanted medical devices like a pacemaker, defibrillator, or continuous glucose monitor? Heart valve and pacemaker  see note    What type of device do you have? Medtronic pacemaker, St Silvio tricuspid valve   Name of the clinic that manages your device [facility]   Do you have artificial joints? (!) YES   Are you allergic to latex? No     Health Care Directive  Patient has a Health Care Directive on file      Preoperative Review of    reviewed - controlled substances reflected in medication list.        Patient Active Problem List    Diagnosis Date Noted    Acute on chronic systolic (congestive) heart failure (H) 02/05/2025     Priority: Medium    Central serous chorioretinopathy of left eye 02/05/2025     Priority: Medium     Noted at recent ophthalmology visit 1/2025. Records scanned in. Likely prednisone related. Will monitor, consider laser treatment in the future.      Restless leg syndrome 04/22/2024     Priority: Medium    Peripheral polyneuropathy 04/22/2024     Priority: Medium    S/P total hip arthroplasty 01/24/2024     Priority: Medium    Falls frequently 12/23/2023     Priority: Medium    Prediabetes 12/23/2023     Priority: Medium    S/P TVR (tricuspid valve replacement) 04/27/2023     Priority: Medium    Heart failure with reduced ejection fraction, NYHA class II (H) 01/24/2023     Priority: Medium    Severe tricuspid regurgitation 12/19/2022     Priority: Medium    Hyponatremia 11/19/2022     Priority: Medium    Iatrogenic cushingoid features 06/09/2022     Priority: Medium     Steroid-induced osteoporosis 06/09/2022     Priority: Medium    Stage 3b chronic kidney disease (H) 10/16/2021     Priority: Medium    Mild protein-calorie malnutrition 10/29/2019     Priority: Medium    Embolism and thrombosis of unspecified artery (H) 10/29/2019     Priority: Medium    Thrombocytopenia, unspecified 10/29/2019     Priority: Medium    Cervical cancer screening 10/18/2018     Priority: Medium     2011, 2015 NIL paps  10/18/2018:NIL pap, Neg HPV Pap screening intervals discussed with oncologist, every 3 years should be fine. Magda Stringer PA-C   10/15/21 NIL pap, Neg HPV-every 3 year cotest due to immunopsupression  12/27/24 NIL pap, neg HPV      Atrial flutter, unspecified type (H) 05/17/2018     Priority: Medium    Paroxysmal atrial fibrillation (H) 05/11/2018     Priority: Medium    H/O cardiac arrest 09/26/2017     Priority: Medium    DLBCL (diffuse large B cell lymphoma) (H) 09/07/2017     Priority: Medium    Physical deconditioning 08/12/2017     Priority: Medium    Diffuse large B-cell lymphoma of extranodal site 07/27/2017     Priority: Medium    Critical illness myopathy 06/16/2017     Priority: Medium    Cardiogenic shock (H) 05/29/2017     Priority: Medium    Atrial tachycardia 05/16/2017     Priority: Medium    Lymphedema of both lower extremities 04/04/2017     Priority: Medium    RA (rheumatoid arthritis) (H) 05/02/2016     Priority: Medium    Hyperlipidemia LDL goal <130 02/22/2011     Priority: Medium    HTN (hypertension)      Priority: Medium    Primary pulmonary hypertension (H)      Priority: Medium     Seeing [facility].         Past Medical History:   Diagnosis Date    Acute on chronic diastolic heart failure (H) 11/19/2022    Antiplatelet or antithrombotic long-term use     Arrhythmia     Arthritis     Atrial fibrillation (H)     Atrial flutter (H)     Bradycardia 12/12/2019    Added automatically from request for surgery [id number]      Cancer (H) June 2017    DLBCL currently in  remission    Cardiac arrest (H) 2017    Chronic kidney disease     Congestive heart failure (H)     Diffuse large B-cell lymphoma of extranodal site 2017    Edema, unspecified type 01/24/2023    Extremity restricted from procedure     Left arm    Fall 12/06/2023    Cut on head    H/O blood transfusion reaction     Hives with platelet transfusion, needs pre-medication with tylenol and benadryl    Heart murmur     History of blood clots 2017    PICC line Right arm     History of chemotherapy     History of transfusion     Hypertension     Hypervolemia, unspecified hypervolemia type 01/24/2023    Lymphedema of both lower extremities     wears lymphedema socks    Neuropathy     Feet and hands    NSVT (nonsustained ventricular tachycardia) (H) 12/2022    Pacemaker 2019    Pericardial effusion 2017    Physical deconditioning     PONV (postoperative nausea and vomiting)     Positive PPD, 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    Prediabetes     Pulmonary hypertension (H)     RA (rheumatoid arthritis) (H)     SSS (sick sinus syndrome) (H) 2019    Pacemaker    Steroid-induced osteoporosis     Thrombocytopenia     VSD (ventricular septal defect)      Past Surgical History:   Procedure Laterality Date    ANESTHESIA CARDIOVERSION N/A 05/17/2017    Procedure: ANESTHESIA CARDIOVERSION;  ANESTHESIA CARDIOVERSION;  Surgeon: GENERIC ANESTHESIA PROVIDER;  Location: [facility] OR    ANESTHESIA CARDIOVERSION  03/12/2018    Procedure: ANESTHESIA CARDIOVERSION;;  Surgeon: GENERIC ANESTHESIA PROVIDER;  Location: [facility] OR    ANESTHESIA CARDIOVERSION N/A 03/23/2018    Procedure: ANESTHESIA CARDIOVERSION;  Anesthesia Cardioversion ;  Surgeon: GENERIC ANESTHESIA PROVIDER;  Location: [facility] OR    ANESTHESIA CARDIOVERSION N/A 04/12/2018    Procedure: ANESTHESIA CARDIOVERSION;  Cardioversion;  Surgeon: GENERIC ANESTHESIA PROVIDER;  Location: [facility] OR    ARTHRODESIS WRIST  2000    Right wrist    BONE MARROW ASPIRATE &BIOPSY  07/12/2017         BONE MARROW BIOPSY W/ ASPIRATION  07/12/2017     CARDIOVERSION      5/17/17, 3/12/18, 3/23/18, 4/14/18,     COLONOSCOPY N/A 11/19/2019    Procedure: Colonoscopy, With Polypectomy And Biopsy;  Surgeon: Pj Vasques MD;  Location: [facility]    CV CORONARY ANGIOGRAM N/A 11/28/2022    Procedure: Coronary Angiogram;  Surgeon: Sundar Wood MD;  Location:  HEART CARDIAC CATH LAB    CV RIGHT HEART CATH MEASUREMENTS RECORDED N/A 11/28/2022    Procedure: Right Heart Catheterization;  Surgeon: Sundar Wood MD;  Location: [facility] CARDIAC CATH LAB    EP ABLATION PVC N/A 12/01/2022    Procedure: Ablation Premature Ventricular Contractions;  Surgeon: Juan Eaton MD;  Location: [facility] CARDIAC CATH LAB    EP PACEMAKER N/A 12/13/2019    Procedure: EP Pacemaker;  Surgeon: Pj Trent MD;  Location: [facility] CARDIAC CATH LAB    EXCISE LESION UPPER EXTREMITY Left 05/22/2018    Procedure: EXCISE LESION UPPER EXTREMITY;  Left Arm Wound Excision And Closure, Possible Submuscular Transposition of Ulnar Nerve  (Choice Anesthesia);  Surgeon: Kevin Sheldon MD;  Location:  OR    FOOT SURGERY      4 left and 2 right    FOOT SURGERY      4 Left and 2 Right     IMPLANT PACEMAKER  12/13/2019    Dr China Trent  [facility] Cardiac Cath lab     IMPLANT PACEMAKER      RELEASE CARPAL TUNNEL Bilateral     REPAIR VALVE TRICUSPID MINIMALLY INVASIVE N/A 04/10/2023    Procedure: MINIMALLY INVASIVE TRICUSPID VALVE REPLACEMENT USING 29MM ST. NILAM EPIC VALVE, FEMORAL CANNULATION AND RIGHT GROIN CUTDOWN, CARDIOPULMONARY BYPASS, TRANSESOPHAGEAL ECHOCARDIOGRAM PERFORMED BY ANESTHESIA STAFF;  Surgeon: Chilango Cope MD;  Location:  OR    REPAIR VENTRICULAR SEPTAL DEFECT  1969    TOTAL HIP ARTHROPLASTY Left 1/24/2024    Procedure: LEFT TOTAL HIP ARTHROPLASTY DIRECT ANTERIOR APPROACH ORTHOGRID;  Surgeon: Romeo Quintana MD;  Location: [facility] OR    TRANSPOSITION ULNAR NERVE (ELBOW) Left 05/22/2018    Procedure: TRANSPOSITION ULNAR NERVE (ELBOW);;   Surgeon: Kevin Sheldon MD;  Location: [facility] OR    VSD REPAIR  1969    ZZC FUSION FOOT BONES,SUBTALAR Right 10/20/2020    Procedure: RIGHT SUBTALAR JOINT FUSION AND CALCANEOCUBOID FUSION;  Surgeon: Nemesio Crow MD;  Location: [facility];  Service: Orthopedics     Current Outpatient Medications   Medication Sig Dispense Refill    Abatacept (ORENCIA) 125 MG/ML SOSY pre-filled syringe Inject 1 mL (125 mg) subcutaneously once a week. 4 mL 5    acetaminophen (TYLENOL) 325 MG tablet Take 3 tablets (975 mg) by mouth every 8 hours      aspirin (ASA) 81 MG chewable tablet 1 tablet (81 mg) by Oral or NG Tube route daily 30 tablet 2    calcium carbonate 600 mg-vitamin D 400 units (CALTRATE) 600-400 MG-UNIT per tablet Take 2 tablets by mouth daily      dexAMETHasone (DECADRON) 4 MG/ML injection Inject 4 mg for adrenal crisis 2 mL 1    diltiazem ER (DILT-XR) 240 MG 24 hr ER beaded capsule Take 1 capsule (240 mg) by mouth daily. 90 capsule 3    docusate sodium (COLACE) 100 MG capsule Take 100 mg by mouth 2 times daily as needed for constipation      ELIQUIS ANTICOAGULANT 5 MG tablet TAKE 1 TABLET(5 MG) BY MOUTH TWICE DAILY 180 tablet 3    empagliflozin (JARDIANCE) 10 MG TABS tablet Take 1 tablet (10 mg) by mouth daily. 90 tablet 3    gabapentin (NEURONTIN) 100 MG capsule Take 1 capsule (100 mg) by mouth 2 times daily AND 2 capsules (200 mg) at bedtime. 360 capsule 3    hydroxychloroquine (PLAQUENIL) 200 MG tablet Take 1 tablet (200 mg) by mouth daily. 90 tablet 1    loratadine (CLARITIN) 10 MG tablet Take 10 mg by mouth daily as needed.      magnesium oxide 200 MG TABS Take 200 mg by mouth daily bedtime      potassium chloride timmy ER (KLOR-CON M20) 20 MEQ CR tablet Take 3 tablets (60 mEq) by mouth daily 270 tablet 3    predniSONE (DELTASONE) 1 MG tablet Take 3 tablets (3 mg) by mouth daily - Oral 270 tablet 3    predniSONE (DELTASONE) 10 MG tablet Take 1 tablet (10 mg) by mouth daily. 20 tablet 1    spironolactone  (ALDACTONE) 25 MG tablet Take 0.5 tablets (12.5 mg) by mouth daily. 45 tablet 3    sulfamethoxazole-trimethoprim (BACTRIM DS) 800-160 MG tablet Take 1 tablet by mouth 2 times daily 4 tablet 5    torsemide (DEMADEX) 20 MG tablet Take 1 tablet (20 mg) by mouth daily. 90 tablet 3    Vitamin D (Cholecalciferol) 10 MCG (400 UNIT) TABS Take 1 tablet by mouth daily      zoledronic acid (RECLAST) 5 MG/100ML infusion 5 mg every year.         Allergies   Allergen Reactions    Blood Transfusion Related (Informational Only) Hives     Hives with platelets. Give benadryl premedication.    Amiodarone Other (See Comments) and Difficulty breathing     Lethargic and had trouble breathing- occurred in 2017    Metoprolol Other (See Comments)     Pt and  report that metoprolol does not work for her and also reports feeling unwell with this medication. She has been able to tolerate atenolol, which as worked in controlling her HR.     Penicillins Other (See Comments)     Childhood reaction, concern for tongue swelling    Tape [Adhesive Tape] Rash        Social History     Tobacco Use    Smoking status: Never    Smokeless tobacco: Never   Substance Use Topics    Alcohol use: Yes     Comment: 2 drinks per week     Family History   Problem Relation Age of Onset    Breast Cancer Mother         60s    Hypertension Mother     Alzheimer Disease Mother     Cerebrovascular Disease Mother     Hypertension Father     Cerebrovascular Disease Father     Atrial fibrillation Father     Lymphoma Father     Prostate Cancer Father     Other Cancer Father         some form of Lymphoma    Alzheimer Disease Maternal Grandmother         likely    Arthritis Maternal Grandfather     Pneumonia Maternal Grandfather     Diabetes Paternal Grandmother     Mental Illness Sister         early onset  alzheimers     History   Drug Use Unknown           Review of Systems  Constitutional, HEENT, cardiovascular, pulmonary, gi and gu systems are negative, except as  "otherwise noted.    Objective    /64 (BP Location: Right arm, Patient Position: Sitting, Cuff Size: Adult Regular)   Pulse 78   Temp 97.5  F (36.4  C) (Tympanic)   Resp 16   Ht 1.448 m (4' 9\")   Wt 56.5 kg (124 lb 9.6 oz)   SpO2 94%   BMI 26.96 kg/m     Estimated body mass index is 26.96 kg/m  as calculated from the following:    Height as of this encounter: 1.448 m (4' 9\").    Weight as of this encounter: 56.5 kg (124 lb 9.6 oz).  Physical Exam  GENERAL: alert and no distress  NECK: no adenopathy, no asymmetry, masses, or scars  RESP: lungs clear to auscultation - no rales, rhonchi or wheezes  CV: regular rate and rhythm, normal S1 S2, no S3 or S4, no murmur, click or rub, no peripheral edema  ABDOMEN: soft, nontender, no hepatosplenomegaly, no masses and bowel sounds normal  MS: no gross musculoskeletal defects noted, no edema    Recent Labs   Lab Test 04/02/25  1017 12/27/24  1204 12/11/24  1424   HGB 16.2  --  15.7   *  --  110*     --  136   POTASSIUM 3.8  --  4.7   CR 1.11  --  1.27*   A1C  --  5.2  --         Diagnostics  Labs pending at this time.  Results will be reviewed when available.   No EKG required for low risk surgery (cataract, skin procedure, breast biopsy, etc).    Revised Cardiac Risk Index (RCRI)  The patient has the following serious cardiovascular risks for perioperative complications:   - No serious cardiac risks = 1 points     RCRI Interpretation: 1 point: Class II (low risk - 0.9% complication rate)         Signed Electronically by: Jesus Nowak MD  A copy of this evaluation report is provided to the requesting physician.         "

## 2025-04-15 ENCOUNTER — TELEPHONE (OUTPATIENT)
Dept: RHEUMATOLOGY | Facility: CLINIC | Age: 65
End: 2025-04-15
Payer: COMMERCIAL

## 2025-04-15 NOTE — TELEPHONE ENCOUNTER
"Per Dr. Domínguez's lab comments on 4/2/25: \"  Improved kidney function (GFR). High CRP inflammation, do you have an increased joint pain?\"      Patient has not seen message. Called and spoke with patient. She states her joint pain, \"flares and goes\". Overall she is happier with the switch to abatacept (Orencia), feels she is more in-tune with her triggers. Joint pain today is improving, some lingering stiffness from the other day but she reports it to be tolerable.         Plan:  -follow up on 5/14, patient requested to be removed from mychart wait list.   -patient to call us if joint pain is worsening/not tolerable.         María BARNETT RN  [facility]    "

## 2025-04-30 ENCOUNTER — HOSPITAL ENCOUNTER (OUTPATIENT)
Dept: CARDIOLOGY | Facility: CLINIC | Age: 65
Discharge: HOME | End: 2025-04-30
Attending: NURSE PRACTITIONER
Payer: COMMERCIAL

## 2025-04-30 DIAGNOSIS — I50.22 CHRONIC SYSTOLIC HEART FAILURE (H): ICD-10-CM

## 2025-04-30 LAB — LVEF ECHO: NORMAL

## 2025-04-30 PROCEDURE — 93306 TTE W/DOPPLER COMPLETE: CPT

## 2025-04-30 PROCEDURE — 93306 TTE W/DOPPLER COMPLETE: CPT | Mod: 26 | Performed by: INTERNAL MEDICINE

## 2025-05-01 DIAGNOSIS — I50.22 CHRONIC SYSTOLIC HEART FAILURE (H): Primary | ICD-10-CM

## 2025-05-02 ENCOUNTER — ANCILLARY PROCEDURE (OUTPATIENT)
Dept: CT IMAGING | Facility: CLINIC | Age: 65
End: 2025-05-02
Attending: NURSE PRACTITIONER
Payer: COMMERCIAL

## 2025-05-02 DIAGNOSIS — I50.22 CHRONIC SYSTOLIC HEART FAILURE (H): ICD-10-CM

## 2025-05-02 DIAGNOSIS — Q21.0 VSD (VENTRICULAR SEPTAL DEFECT): ICD-10-CM

## 2025-05-02 LAB
CREAT BLD-MCNC: 1.5 MG/DL (ref 0.5–1.2)
EGFRCR SERPLBLD CKD-EPI 2021: 38 ML/MIN/1.73M2

## 2025-05-02 PROCEDURE — 82565 ASSAY OF CREATININE: CPT | Performed by: PATHOLOGY

## 2025-05-02 PROCEDURE — 71275 CT ANGIOGRAPHY CHEST: CPT | Performed by: STUDENT IN AN ORGANIZED HEALTH CARE EDUCATION/TRAINING PROGRAM

## 2025-05-02 RX ORDER — IOPAMIDOL 755 MG/ML
90 INJECTION, SOLUTION INTRAVASCULAR ONCE
Status: COMPLETED | OUTPATIENT
Start: 2025-05-02 | End: 2025-05-02

## 2025-05-02 RX ADMIN — IOPAMIDOL 90 ML: 755 INJECTION, SOLUTION INTRAVASCULAR at 12:33

## 2025-05-02 NOTE — DISCHARGE INSTRUCTIONS

## 2025-05-05 ENCOUNTER — PATIENT OUTREACH (OUTPATIENT)
Dept: CARDIOLOGY | Facility: CLINIC | Age: 65
End: 2025-05-05
Payer: COMMERCIAL

## 2025-05-05 DIAGNOSIS — I50.30 HEART FAILURE WITH PRESERVED EJECTION FRACTION, NYHA CLASS I (H): Primary | ICD-10-CM

## 2025-05-05 NOTE — TELEPHONE ENCOUNTER
Called Amelia to review her CTA results.     Concern was for large goiter visible on the imaging. Per Amelia, she is followed with endocrine and they are aware of the goiter, have been following for three years.   We discussed that Elmira Vasquez NP would also like to refer her to a cardiologist, Amelia was agreeable as long as she could continue to see COSME Mulligan's may concern was the report on the Echo that mentioned that the echo dense spot might be in her liver.  She had liver cancer in the past and this was found in a similar way.  Is not currently followed by oncology as it has been greater than 7 years for her.     Date: 5/5/2025    Time of Call: 10:39 AM     Diagnosis:  heart failure       [ VORB ] Ordering provider: Elmira Vasquez NP    Order: referral to general cardiology for HFpEF following      Order received by: Kandy Molina RN       Follow-up/additional notes:     Reviewed with Elmira Vasquez NP, she sent message to Amelia's oncologist (or oncologist who is covering former patients of Dr Heredia).  Amelia stated understanding and will let me know if she doesn't hear from oncology in a few days.

## 2025-05-06 ENCOUNTER — PATIENT OUTREACH (OUTPATIENT)
Dept: CARE COORDINATION | Facility: CLINIC | Age: 65
End: 2025-05-06
Payer: COMMERCIAL

## 2025-05-06 DIAGNOSIS — C83.398 DIFFUSE LARGE B-CELL LYMPHOMA OF EXTRANODAL SITE EXCLUDING SPLEEN AND OTHER SOLID ORGANS (H): Primary | ICD-10-CM

## 2025-05-07 ENCOUNTER — TELEPHONE (OUTPATIENT)
Dept: EMERGENCY MEDICINE | Facility: CLINIC | Age: 65
End: 2025-05-07
Payer: COMMERCIAL

## 2025-05-07 ENCOUNTER — PATIENT OUTREACH (OUTPATIENT)
Dept: ONCOLOGY | Facility: CLINIC | Age: 65
End: 2025-05-07
Payer: COMMERCIAL

## 2025-05-07 NOTE — PROGRESS NOTES
[facility]: Cancer Care Short Note                                    Discussion with Patient:                                                        OUTBOUND CALL: RNCC called patient-- introduced self as RNCC working with Dr. Heredia's patients. Pt ws seen in December 2024 with MD-- at that time pt continued to be in a CR from DLBCL diagnosis and treatment in 2017. Decided t 12/2024 viit that pt would follow up on a prn basis.   RNCC was contacted by cards team-- pt had an echo which showed new echodense mass adjacent to the right ventricle measuring 6 x 3.3 cm. This could suggest a mass in the pericardium although cannot exclude it being in the liver. Cards team wondering if PET is needed.   RNCC looped in [facility] lymphoma specialist Dr. Noriega-- she agreed that PET and labs are needed STAT. Will have scheduling team add this and call pt with times. Pt will see KEVIN Zuleima after to review-- may schedule follow up with dr. Noriega in kala of this appt pending her review of PET results.   Overall pt is feeling well. Denies any b-symptoms including fever, infection, night sweats, LAD, weight loss, or new fatigue. Reeducated pt on use of triage line should any symptoms arise.     Pt verbalizes understanding and has no other questions, comments, or concerns at this time. She is appreciative of the call.     Samia Calhoun, RN, MSN, OCN   RN Care Coordinator   [facility]   [address] [place] [id number]   [phone]

## 2025-05-08 ENCOUNTER — PATIENT OUTREACH (OUTPATIENT)
Dept: CARE COORDINATION | Facility: CLINIC | Age: 65
End: 2025-05-08
Payer: COMMERCIAL

## 2025-05-08 NOTE — TELEPHONE ENCOUNTER
Updated Amelia that Elmira Vasquez NP, is fine with following with [facility].

## 2025-05-08 NOTE — TELEPHONE ENCOUNTER
I will forward to cardiology, as they prescribe her diuretics. Looks like she is already taking torsemide.  Magda Stringer PA-C

## 2025-05-12 ENCOUNTER — LAB (OUTPATIENT)
Dept: LAB | Facility: CLINIC | Age: 65
End: 2025-05-12
Payer: COMMERCIAL

## 2025-05-12 ENCOUNTER — PATIENT OUTREACH (OUTPATIENT)
Dept: ONCOLOGY | Facility: CLINIC | Age: 65
End: 2025-05-12
Payer: COMMERCIAL

## 2025-05-12 DIAGNOSIS — C83.398 DIFFUSE LARGE B-CELL LYMPHOMA OF EXTRANODAL SITE EXCLUDING SPLEEN AND OTHER SOLID ORGANS (H): ICD-10-CM

## 2025-05-12 LAB
BASOPHILS # BLD AUTO: 0 10E3/UL (ref 0–0.2)
BASOPHILS NFR BLD AUTO: 1 %
EOSINOPHIL # BLD AUTO: 0 10E3/UL (ref 0–0.7)
EOSINOPHIL NFR BLD AUTO: 1 %
ERYTHROCYTE [DISTWIDTH] IN BLOOD BY AUTOMATED COUNT: 11.8 % (ref 10–15)
HCT VFR BLD AUTO: 44.6 % (ref 35–53)
HGB BLD-MCNC: 15.4 G/DL (ref 11.7–17.7)
IMM GRANULOCYTES # BLD: 0 10E3/UL
IMM GRANULOCYTES NFR BLD: 0 %
LYMPHOCYTES # BLD AUTO: 0.4 10E3/UL (ref 0.8–5.3)
LYMPHOCYTES NFR BLD AUTO: 8 %
MCH RBC QN AUTO: 34.7 PG (ref 26.5–33)
MCHC RBC AUTO-ENTMCNC: 34.5 G/DL (ref 31.5–36.5)
MCV RBC AUTO: 101 FL (ref 78–100)
MONOCYTES # BLD AUTO: 0.5 10E3/UL (ref 0–1.3)
MONOCYTES NFR BLD AUTO: 10 %
NEUTROPHILS # BLD AUTO: 4.1 10E3/UL (ref 1.6–8.3)
NEUTROPHILS NFR BLD AUTO: 81 %
PLATELET # BLD AUTO: 123 10E3/UL (ref 150–450)
RBC # BLD AUTO: 4.44 10E6/UL (ref 3.8–5.9)
WBC # BLD AUTO: 5.1 10E3/UL (ref 4–11)

## 2025-05-12 PROCEDURE — 85025 COMPLETE CBC W/AUTO DIFF WBC: CPT

## 2025-05-12 PROCEDURE — 83615 LACTATE (LD) (LDH) ENZYME: CPT

## 2025-05-12 PROCEDURE — 36415 COLL VENOUS BLD VENIPUNCTURE: CPT

## 2025-05-12 PROCEDURE — 80053 COMPREHEN METABOLIC PANEL: CPT

## 2025-05-12 NOTE — PROGRESS NOTES
[facility]: Cancer Care Short Note                                    Discussion with Patient:                                                        OUTBOUND CALL: RNCC called patient aat request of leadership. PET that is scheduled for today is still pending auth-- recommended that appt gets rescheduled for today. Pt is agreeable. Will have scheduling team reach out to her today to rescheduled PET/CT for later this week and move back KEVIN visit this is scheduled for 5/13. Pt is agreeable to having labs done at [facility] today. Overall she is feeling well with no new or acute symptoms.  She will wait to hear back from scheduling. Pt verbalizes understanding and has no other questions, comments, or concerns at this time.     Samia Calhoun, RN, MSN, OCN   RN Care Coordinator   [facility]   [address] [place] [id number] [phone]

## 2025-05-13 LAB
ALBUMIN SERPL BCG-MCNC: 4.8 G/DL (ref 3.5–5.2)
ALP SERPL-CCNC: 99 U/L (ref 40–150)
ALT SERPL W P-5'-P-CCNC: 23 U/L (ref 0–70)
ANION GAP SERPL CALCULATED.3IONS-SCNC: 11 MMOL/L (ref 7–15)
AST SERPL W P-5'-P-CCNC: 36 U/L (ref 0–45)
BILIRUB SERPL-MCNC: 0.8 MG/DL
BUN SERPL-MCNC: 32.7 MG/DL (ref 8–23)
CALCIUM SERPL-MCNC: 9.6 MG/DL (ref 8.8–10.4)
CHLORIDE SERPL-SCNC: 97 MMOL/L (ref 98–107)
CREAT SERPL-MCNC: 1.14 MG/DL (ref 0.51–1.17)
EGFRCR SERPLBLD CKD-EPI 2021: 53 ML/MIN/1.73M2
GLUCOSE SERPL-MCNC: 95 MG/DL (ref 70–99)
HCO3 SERPL-SCNC: 29 MMOL/L (ref 22–29)
LDH SERPL L TO P-CCNC: 276 U/L (ref 0–250)
POTASSIUM SERPL-SCNC: 3.7 MMOL/L (ref 3.4–5.3)
PROT SERPL-MCNC: 7.8 G/DL (ref 6.4–8.3)
SODIUM SERPL-SCNC: 137 MMOL/L (ref 135–145)

## 2025-05-14 ENCOUNTER — OFFICE VISIT (OUTPATIENT)
Dept: RHEUMATOLOGY | Facility: CLINIC | Age: 65
End: 2025-05-14
Attending: INTERNAL MEDICINE
Payer: COMMERCIAL

## 2025-05-14 VITALS
WEIGHT: 124 LBS | HEART RATE: 84 BPM | OXYGEN SATURATION: 95 % | SYSTOLIC BLOOD PRESSURE: 125 MMHG | DIASTOLIC BLOOD PRESSURE: 83 MMHG | BODY MASS INDEX: 26.83 KG/M2

## 2025-05-14 DIAGNOSIS — E27.49 SECONDARY ADRENAL INSUFFICIENCY: ICD-10-CM

## 2025-05-14 DIAGNOSIS — M81.8 STEROID-INDUCED OSTEOPOROSIS: ICD-10-CM

## 2025-05-14 DIAGNOSIS — M05.79 RHEUMATOID ARTHRITIS INVOLVING MULTIPLE SITES WITH POSITIVE RHEUMATOID FACTOR (H): ICD-10-CM

## 2025-05-14 DIAGNOSIS — T38.0X5A STEROID-INDUCED OSTEOPOROSIS: ICD-10-CM

## 2025-05-14 PROCEDURE — 99214 OFFICE O/P EST MOD 30 MIN: CPT | Performed by: INTERNAL MEDICINE

## 2025-05-14 PROCEDURE — G2211 COMPLEX E/M VISIT ADD ON: HCPCS | Performed by: INTERNAL MEDICINE

## 2025-05-14 PROCEDURE — 99213 OFFICE O/P EST LOW 20 MIN: CPT | Performed by: INTERNAL MEDICINE

## 2025-05-14 PROCEDURE — 1126F AMNT PAIN NOTED NONE PRSNT: CPT | Performed by: INTERNAL MEDICINE

## 2025-05-14 PROCEDURE — 3079F DIAST BP 80-89 MM HG: CPT | Performed by: INTERNAL MEDICINE

## 2025-05-14 PROCEDURE — 3074F SYST BP LT 130 MM HG: CPT | Performed by: INTERNAL MEDICINE

## 2025-05-14 RX ORDER — PREDNISONE 10 MG/1
10 TABLET ORAL DAILY
Qty: 20 TABLET | Refills: 1 | Status: SHIPPED | OUTPATIENT
Start: 2025-05-14 | End: 2025-05-14

## 2025-05-14 RX ORDER — HYDROXYCHLOROQUINE SULFATE 200 MG/1
200 TABLET, FILM COATED ORAL DAILY
Qty: 90 TABLET | Refills: 1 | Status: SHIPPED | OUTPATIENT
Start: 2025-05-14

## 2025-05-14 RX ORDER — PREDNISONE 1 MG/1
TABLET ORAL
Qty: 270 TABLET | Refills: 3 | Status: SHIPPED | OUTPATIENT
Start: 2025-05-14

## 2025-05-14 ASSESSMENT — PAIN SCALES - GENERAL: PAINLEVEL_OUTOF10: NO PAIN (0)

## 2025-05-14 NOTE — NURSING NOTE
"Chief Complaint   Patient presents with    RECHECK     Follow-up        Vital signs:      BP: 125/83 Pulse: 84     SpO2: 95 %       Weight: 56.2 kg (124 lb)  Estimated body mass index is 26.83 kg/m  as calculated from the following:    Height as of 4/10/25: 1.448 m (4' 9\").    Weight as of this encounter: 56.2 kg (124 lb).      Jackie Beaver CMA   5/14/2025 11:35 AM    "

## 2025-05-14 NOTE — LETTER
5/14/2025       RE: Amelia Michel  [address]  [place] [address]     Dear Colleague,    Thank you for referring your patient, Amelia Michel, to the [facility] RHEUMATOLOGY [facility]IC [place] at [facility]. Please see a copy of my visit note below.    Last seen: 10/23/2024    DOS: 5/14/2025    Reason for in person follow up visit: RA, high risk med use    [facility] - Rheumatology Clinic Visit    Amelia Michel is a 61 year old here for rheumatoid arthritis (RA) dx 35 y/o. +CCP >331 -RF -KALYAN panel. 5-2108 XR 5-2018 DDD and facet osteoarthritis, congential fusion C2-3) prednisone since dx at 35 y/o, hydroxychloroquine (plaquenil) long-time since dx tapered by 200 mg 9-2019. Lymphoma in liver, heart, bone and mass between esophagus, left pelvic, right ankle.     Review-Dr. Dumas in [facility] for many years. She had relatively inactive disease prior to her arrest but was on methotrexate 10 mg PO per week, Arava 10 mg daily, Plaquenil 200 mg twice per day, and prednisone 3 mg daily. She has had partial fusion of her right wrist. After discharge from the hospital she was put on prednisone 5 mg daily as monotherapy. Sulfasalazine -not effective long-time, initial alan but resolved after retried. Past Dr. Cunha and Dr. 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 Treated concervatively with plaquenil and 5mg prednisone, and not interested in rituximab or other biologics. She has previously been treated with sulfasalazine and initially developed a rash which was initially thought to be a sulfa rash, but her rash resolved and did not reoccur after being placed back on sulfasalazine. She was also treated with Arava, but this discontinued during her cancer treatment. She feels that these drugs have had no effect on her  "symptoms. Etanercept (enbrel) in past no benefits.     March 10, 2021  Denies any fever, chills, SOB, MONTALVO, night sweats, or chest pain, high fever, cough, travel in last 14 days or exposure to covid-19 (coronavirus). Reports healthy. Follows CDC guidelines. Sinus issues for a week other then that fine. Not out tho either. Will be getting vaccine signing in now.     Arthritis is hand bothering her. Left worse, left hand neuropathy from antecubital IV reaction \"take out muscle and fat\". Its her thumb gets her issues. Hurts all the time.  Not swelling. Base of thumb some tingling. The same. With her history cardiac arrest and chemo does have splint, does not need OT. Walking now and mentally doing good.      Right ankle triple arthrodesis Oct 2020, learning how to walk properly. \"much better\".  Doing more more active.     Fosamax 70 mg every 7 days restarted 9-2020 per Dr. Maribell Domínguez, was on with PCP  In past \"not concerned on for too long\" will need future time off of this was off for 18 month. Prednisone 5 mg once day chronically, hydroxychloroquine (plaquenil) 200 mg once day -eye exam in Dec 2020 early catarct          9/9/2021: Ms. Michel is doing fairly well with no major complaints or RA flare ups on  mg qd+predniosne 5 mg every day. Eye exam is updated. Fully vaccinated. PCP told her to hold fosamax till further discussion in Oct.      Has pain over palms. This gets worse throughout the day. No AM stiffness. Does more chores around the house which is a trigger.     1st covid vaccine caused body ache, second dose caused fatigue.    3/9/2022:    No joint swelling, has edema due HF, joints hurt, warm shower helps, lost strength, has stiffness all day.    Eye exam is due 1/2023    Off pred since 3/3 .    9/16/2022:    Amelia is here for follow up. Has pain over R foot, is back on PT,     Has problem with fine motor, it's hard to hold a plate.    Has SOB on and off due to Afib, CHF. BP is up. Seeing " cardiology.    No am stiffness.    No oral ulcers.    No rash.    Back on pred 3 mg every day.    Back on fosamax.      09/21/2023  Overall reports feeling more pain .   - She has a new sharp left buttock pain radiating to the left thigh and stopping at the knee x3 months. No specific exacerbating or relieving factors.   - She reports progressively worsening right ankle pain. In 2020 this joint was surgically fused and since then has progressively inverted and affecting her gait. She is now having increasing right ankle, knee, and hip pain.   - Chronic left knee pain worsening over time. She has had 3 joints injections in the knee but without significant improvement.    - States hands have been more crampy. She denies redness/heat/swelling. States the chronic deformities and cramping affects her dexterity and worsens her ability to complete some activities like cooking    Last saw oncology 9/14 with good report    Current medications include Plaquenil and Prednisone 3mg. Tylenol occasionally but no relief. Last eye exam 01/2023, next already scheduled for 01/2024.     At this time interested in exploring other medications due to increased pain. Questions if pain management may be beneficial as well.   - Currently on Fosamax, start Reclast on Monday    ROS   + dry itchy eyes and itchy ears (improves with claritin)   - No fevers, weight loss, night sweats, chest pain, dyspnea, cough, N/V/D, eye pain or vision changes, nail changes, mouth sores, raynauds,       6/28/2024:  -had L hip arthroplasty on 1/24/2024 after fracture, recovered  -has neuropathy, hard to do things with neuropathy  -pain mx did not help with joint pain  -hands are stiff more at night, wakes her up  -MCPs are hurting at palmar side, more on L side  -had R ankle arthrodesis, wears a brace pain on and off. Does exercise and does massage tx for tony horse pain  -no joint swelling  -her plaquenil dose is 1 tab of 200 mg a day    10/23/24:  - PMH  seropositive RA, OA s/p L hip arthroplasty 1/2024, hx lymphoma   - Current regimen: Plaquenil 200 mg daily, Prednisone 3 mg daily, Acemra 162 mg [id number], Reclast   - Last evaluated 6/2024. Started Actemra 7/2024 Monday's. Labs 9/2024 with WBC 3.4,   - Pain: Ongoing. Mainly to bilateral palms, burning pain, right hand occasionally swelling, both get stiff, no redness or warmth. Also has pain to gluteal on both sides- no trauma, newer, no redness/swelling/warmth, has tried massage with little relief.   - Sometimes has trigger finger in fourth finger. Has known neuropathy. Saw Neurology without much improvement, recommended nerve biopsy. Gabapentin 400 mg daily currently.   - Has not noticed any difference since starting Actemra. OK to give more time, but if not helping would  - Has eye exam scheduled 1/2025  - No recent infections          Today 5/14/2025:    Significant improvement of pain/stiffness/function of the hands on orencia, will continue    ROS: A comprehensive ROS was done. Positives are per HPI.      PMH:  Injury-left thumb amputation, left finger broke  Medical-  Antiplatelet or antithrombotic long-term use  Arrhythmia  Atrial tachycardia, flutter  Paroxysmal atrial fibrillation  Arthritis  Lymphoma  Cardiac arrest  history of chemotherapy  Primary pulmonary hypertension  Neuropathy  Postoperative nausea and vomiting  Rheumatoid arthritis  Hyperlipidemia LDL goal <130  Lymphedema of both lower extremities  Cardiogenic shock and cardiac arrest 5/2017  Acute respiratory failure with hypoxia  Critical illness myopathy  Sepsis  Wound of left upper extremity  Diffuse large B-cell lymphoma of extranodal site  Febrile neutropenia  Physical deconditioning  Palpitations  Osteoporosis  -DEXA 2018   Slowly healing wound in medial left antecubital fossa after traumatic IV  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, latter felt from lymphoma   Neuropathy of right foot with weakness  "after intrathecal cytarabine  perioditis   Right arm PICC blood clots during cancer treatments  Right shoulder effusion when had pericarditis       Past Surgical History:  Anesthesia cardioversion  Right wrist arthrodesis, partial fusion \"history migrated out\"  Bone marrow aspirate & biopsy  Excise lesion upper extremity - left wound excision and closure, possible submuscular transposition of ulnar nerve  Foot surgery - 4 left, 2 right  Carpal tunnel bilateral release  Repair ventricular septal defect  Transpositional ulnar nerve (elbow) left   Right shoulder effusion history     Family History:   No autoimmune disorders, psoriasis, UC, crohn's, SLE, RA, PsA, gout, autoimmune thyroid.  No MS, heart disease in family  Mother - breast cancer, hypertension, alzheimer disease-passed   Father - hypertension, lymphoma, circulatory A fib-passed  Paternal grandmother - diabetes  Siblings-younger sister heatlhy PB, younger brother think arthritis not dx PB     Social History:   No smoking or tobacco use. No alcohol use. No IVDU. None Children. Right handed. . Disability          ROS:    A comprehensive ROS was done. Positives are per HPI.      Ph. E:    /83   Pulse 84   Wt 56.2 kg (124 lb)   SpO2 95%   BMI 26.83 kg/m      Constitutional: NAD, pleasant, ambulates with a cane  Eyes: Normal EOM, conjunctiva, sclera  ENT: no oral ulcers  Neck: No mass or thyroid enlargement  Chest: old sternotomy scar, pacemaker implanted on left chest wall, good perfusion. CTAB  CV: no M/R/G, irregular HR (Afib)  Abdomen: soft, NT  Lymph: No cervical, supraclavicular nodes  MS: Ulnar deviation of hands. No active synovitis. Chronic RA and surgical deformities.   Skin: No rash  Neuro: grossly non focal  Psych: Normal affect.     Labs/Imaging:  Eye Exam (10/23/18)  Significant for mild H-lated nuclear cataracts.   Ocular CT was recommended for right eye.     CT Chest/Abdomen/Pelvis (9/26/18)  In this patient with a history of " lymphoma:  1. No evidence of disease recurrence, consistent with complete  response per Lugano criteria.  2. Stable cardiomegaly. Improved pericardial effusion.  3. Early contrast phase with heterogeneous visceral enhancement in the  abdomen, likely secondary to cardiac dysfunction.    MRI Cardiac w/contrast (4/12/18)  Loculated exudative pericardial effusion that is beginning to organize, with diffuse  pericardial enhancement consistent with ongoing inflammation. Normal LV fxn, LVEF 54% and RVEF 49%. At  least moderate, possibly severe tricuspid regurgitation. Compared to MRI from 03/2018, effusion is largely  unchanged in size (maybe a bit smaller) but is starting to organize. No change in pericardial enhancement.    Laboratory:   Component      Latest Ref Rng & Units 3/25/2021 5/26/2021 6/23/2021 7/21/2021   WBC      4.0 - 11.0 thou/uL 5.6      RBC Count      3.80 - 5.40 mill/uL 4.31      Hemoglobin      12.0 - 16.0 g/dL 15.4      Hematocrit      35.0 - 47.0 % 44.6      MCV      80 - 100 fL 104 (H)      MCH      27.0 - 34.0 pg 35.7 (H)      MCHC      32.0 - 36.0 g/dL 34.5      RDW      11.0 - 14.5 % 14.4      Platelet Count      140 - 440 thou/uL 126 (L)      Mean Platelet Volume      7.0 - 10.0 fL 9.5      % Neutrophils      50 - 70 % 80 (H)      % Lymphocytes      20 - 40 % 10 (L)      % Monocytes      2 - 10 % 9      % Eosinophils      0 - 6 % 1      % Basophils      0 - 2 % 0      % Immature Granulocytes      <=0 % 0      Absolute Neutrophils      2.0 - 7.7 thou/uL 4.5      Absolute Lymphocytes      0.8 - 4.4 thou/uL 0.6 (L)      Absolute Monocytes      0.0 - 0.9 thou/uL 0.5      Eosinophils Absolute      0.0 - 0.4 thou/uL 0.0      Absolute Basophils      0.0 - 0.2 thou/uL 0.0      Absolute Immature Granulocytes      <=0.0 thou/uL 0.0      Sodium      133 - 144 mmol/L  136 136 137   Potassium      3.4 - 5.3 mmol/L  3.6 4.2 3.9   Chloride      94 - 109 mmol/L  101 102 103   Carbon Dioxide      20 - 32 mmol/L   24 28 28   Anion Gap      3 - 14 mmol/L  11 6 6   Glucose      70 - 99 mg/dL  100 (H) 98 110 (H)   Urea Nitrogen      7 - 30 mg/dL  21 25 29   Creatinine      0.52 - 1.25 mg/dL  1.00 1.05 (H) 1.02   GFR Estimate      >60 mL/min/1.73m2  61 57 (L) 60 (L)   GFR Estimate If Black      >60 mL/min/1.73:m2  71 67    Calcium      8.5 - 10.1 mg/dL  9.3 10.0 9.6   Bilirubin Direct      0.0 - 0.2 mg/dL   0.5 (H)    Bilirubin Total      0.2 - 1.3 mg/dL   1.2    Albumin      3.4 - 5.0 g/dL   4.2    Protein Total      6.8 - 8.8 g/dL   7.5    Alkaline Phosphatase      40 - 150 U/L   136    ALT      0 - 50 U/L   41    AST      0 - 45 U/L   36    N-Terminal Pro Bnp      0 - 125 pg/mL  1,274 (H)     Hemoglobin A1C      0 - 5.6 %  6.2 (H)     Immunoglobulin G      700-1,700 mg/dL 1,179          Impression/Plan:  1. Seropositive rheumatoid arthritis (, RF 31) with multiple joint disability including MTP fusion 1-5 bilaterally, partial right wrist fusion, subluxation and ulnar deformity of MCP's. Rheumatoid arthritis (RA) controlled. Continue prednisone at 3 mg every day (more pain, stiffness off prednisone when she was off), and hydroxychloroquine (plaquenil) 200 mg once a day. Rheumatoid arthritis does not seem to be flaring today, however her joint deformities are beginning to affect her quality of life due to reduced dexterity. Given her history of Lymphoma would stay away from TNF inhibitors. Will consider Actemra for better control. Will order screening labs today and refer to pain management in the interim.     2. Long-term hydroxychloroquine (plaquenil) use since 6-2017, no toxicity so far, reviewed AAO guidelines, repeat every year.    3. Diffuse large B-cell lymphoma status-post 1 cycle R-EPOCH, 6 cycles R-CHOP; Neuropathy of lower extremities attributed to lymphoma improved on gabapentin 200 mg TID-tolerating. Loculated pericardial effusion, etiology unclear (lymphoma). No symptoms now. Last saw Oncology 09/2023 with  good report.    4. Osteoporosis, chronic long-term corticosteroid use with cushingoid appearance. Received 6-7 years of alendronate in early 2000s. Restarted alendronate in 1/2019, off 3-2020 to 9-2020 then restarted 9-2020. DEXA  T-2.6 thoracic. If > 5 yr higher risk atypical Fx. Off fosamax at last visit given improvement of bone density in 3/2021 but on chronic steroid tx, did refer to endocrinology to manage steroid induced osteoporosis and endocrinology resumed it in 11/2021. Calcium and GFR normal . On Fosamax now, planning to switch to Reclast on 09/25/2023 6/28/2024:  Active RA, significant hand pain affecting quality of life. Recommend adding actemra; risks were discussed.    Plan:  Continue HCQ 200mg every day with yearly eye exam  Continue prednisone 3 mg a day  Start actemra when it gets approved by insurance  Labs 1 month after start of actemra   Return in person in about 4 months       10/23/24:  Patient has ongoing pain to bilateral hands and persistent morning stiffness. Exam with mild synovitis to left dorsal wrist. Has not noticed any clinical benefit with Actemra. Discussed options with patient, and she would prefer to switch to Orencia at this time. Reviewed slow mechanism of action for medication- would not anticipate full benefit for several months. Treatment options otherwise. Has not had benefit with SSZ. Would avoid agents that increase risk for lymphoma given history including Methotrexate, JAK2 inhibitors, anti-TNF. Discussed that Rituximab can decrease immune system, but it is a good alternative option given fda approved indication for lymphoma and she tolerated it in the past. Uncontrolled neuropathy also likely contributing to pain and will be addressed by PCP.     PLAN:  - Stop Actemra [id number]  - Start Orencia weekly; would not anticipate benefit for several months; risks were discussed  - Labs today as below  - Continue  mg daily with annual eye exam   -  Continue Prednisone 3 mg daily   - Return in 4-5 months in person for reevaluation  - Consider increasing Gabapentin dose by PCP for neuropathic component     Today 5/14/2025:    Improvement of hand stiffness/function on orencia, no change in meds today.    Plan:    Return in 6 months in person    TT 40 min was spent on date of the encounter doing chart review, history and exam, documentation and further activities as noted above. Any prior notes, outside records, laboratory results, and imaging studies were reviewed if relevant.      The longitudinal plan of care for the diagnosis(es)/condition(s) as documented were addressed during this visit. Due to the added complexity in care, I will continue to support Amelia in the subsequent management and with ongoing continuity of care.      Maribell Domínguez MD    Again, thank you for allowing me to participate in the care of your patient.      Sincerely,    Maribell Domínguez MD

## 2025-05-14 NOTE — PROGRESS NOTES
Last seen: 9/21/2023    DOS: 10/23/2024    Reason for in person follow up visit: RA, high risk med use    [facility] - Rheumatology Clinic Visit    Amelia Michel is a 61 year old here for rheumatoid arthritis (RA) dx 33 y/o. +CCP >331 -RF -KALYAN panel. 5-2108 XR 5-2018 DDD and facet osteoarthritis, congential fusion C2-3) prednisone since dx at 33 y/o, hydroxychloroquine (plaquenil) long-time since dx tapered by 200 mg 9-2019. Lymphoma in liver, heart, bone and mass between esophagus, left pelvic, right ankle.     Review-Dr. Dumas in [facility] for many years. She had relatively inactive disease prior to her arrest but was on methotrexate 10 mg PO per week, Arava 10 mg daily, Plaquenil 200 mg twice per day, and prednisone 3 mg daily. She has had partial fusion of her right wrist. After discharge from the hospital she was put on prednisone 5 mg daily as monotherapy. Sulfasalazine -not effective long-time, initial alan but resolved after retried. Past Dr. Cunha and Dr. 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 Treated concervatively with plaquenil and 5mg prednisone, and not interested in rituximab or other biologics. She has previously been treated with sulfasalazine and initially developed a rash which was initially thought to be a sulfa rash, but her rash resolved and did not reoccur after being placed back on sulfasalazine. She was also treated with Arava, but this discontinued during her cancer treatment. She feels that these drugs have had no effect on her symptoms. Etanercept (enbrel) in past no benefits.     March 10, 2021  Denies any fever, chills, SOB, MONTALVO, night sweats, or chest pain, high fever, cough, travel in last 14 days or exposure to covid-19 (coronavirus). Reports healthy. Follows CDC guidelines. Sinus issues for a week other then that fine. Not out tho  "either. Will be getting vaccine signing in now.     Arthritis is hand bothering her. Left worse, left hand neuropathy from antecubital IV reaction \"take out muscle and fat\". Its her thumb gets her issues. Hurts all the time.  Not swelling. Base of thumb some tingling. The same. With her history cardiac arrest and chemo does have splint, does not need OT. Walking now and mentally doing good.      Right ankle triple arthrodesis Oct 2020, learning how to walk properly. \"much better\".  Doing more more active.     Fosamax 70 mg every 7 days restarted 9-2020 per Dr. Maribell Domínguez, was on with PCP  In past \"not concerned on for too long\" will need future time off of this was off for 18 month. Prednisone 5 mg once day chronically, hydroxychloroquine (plaquenil) 200 mg once day -eye exam in Dec 2020 early catarct          9/9/2021: Ms. Michel is doing fairly well with no major complaints or RA flare ups on  mg qd+predniosne 5 mg every day. Eye exam is updated. Fully vaccinated. PCP told her to hold fosamax till further discussion in Oct.      Has pain over palms. This gets worse throughout the day. No AM stiffness. Does more chores around the house which is a trigger.     1st covid vaccine caused body ache, second dose caused fatigue.    3/9/2022:    No joint swelling, has edema due HF, joints hurt, warm shower helps, lost strength, has stiffness all day.    Eye exam is due 1/2023    Off pred since 3/3 .    9/16/2022:    Amelia is here for follow up. Has pain over R foot, is back on PT,     Has problem with fine motor, it's hard to hold a plate.    Has SOB on and off due to Afib, CHF. BP is up. Seeing cardiology.    No am stiffness.    No oral ulcers.    No rash.    Back on pred 3 mg every day.    Back on fosamax.      09/21/2023  Overall reports feeling more pain .   - She has a new sharp left buttock pain radiating to the left thigh and stopping at the knee x3 months. No specific exacerbating or relieving " factors.   - She reports progressively worsening right ankle pain. In 2020 this joint was surgically fused and since then has progressively inverted and affecting her gait. She is now having increasing right ankle, knee, and hip pain.   - Chronic left knee pain worsening over time. She has had 3 joints injections in the knee but without significant improvement.    - States hands have been more crampy. She denies redness/heat/swelling. States the chronic deformities and cramping affects her dexterity and worsens her ability to complete some activities like cooking    Last saw oncology 9/14 with good report    Current medications include Plaquenil and Prednisone 3mg. Tylenol occasionally but no relief. Last eye exam 01/2023, next already scheduled for 01/2024.     At this time interested in exploring other medications due to increased pain. Questions if pain management may be beneficial as well.   - Currently on Fosamax, start Reclast on Monday    ROS   + dry itchy eyes and itchy ears (improves with claritin)   - No fevers, weight loss, night sweats, chest pain, dyspnea, cough, N/V/D, eye pain or vision changes, nail changes, mouth sores, raynauds,       6/28/2024:  -had L hip arthroplasty on 1/24/2024 after fracture, recovered  -has neuropathy, hard to do things with neuropathy  -pain mx did not help with joint pain  -hands are stiff more at night, wakes her up  -MCPs are hurting at palmar side, more on L side  -had R ankle arthrodesis, wears a brace pain on and off. Does exercise and does massage tx for tony horse pain  -no joint swelling  -her plaquenil dose is 1 tab of 200 mg a day    TODAY 10/23/24:  - PMH seropositive RA, OA s/p L hip arthroplasty 1/2024, hx lymphoma   - Current regimen: Plaquenil 200 mg daily, Prednisone 3 mg daily, Acemra 162 mg [id number], Reclast   - Last evaluated 6/2024. Started Actemra 7/2024 Monday's. Labs 9/2024 with WBC 3.4,   - Pain: Ongoing. Mainly to bilateral palms, burning  "pain, right hand occasionally swelling, both get stiff, no redness or warmth. Also has pain to gluteal on both sides- no trauma, newer, no redness/swelling/warmth, has tried massage with little relief.   - Sometimes has trigger finger in fourth finger. Has known neuropathy. Saw Neurology without much improvement, recommended nerve biopsy. Gabapentin 400 mg daily currently.   - Has not noticed any difference since starting Actemra. OK to give more time, but if not helping would  - Has eye exam scheduled 1/2025  - No recent infections        ROS: A comprehensive ROS was done. Positives are per HPI.      PMH:  Injury-left thumb amputation, left finger broke  Medical-  Antiplatelet or antithrombotic long-term use  Arrhythmia  Atrial tachycardia, flutter  Paroxysmal atrial fibrillation  Arthritis  Lymphoma  Cardiac arrest  history of chemotherapy  Primary pulmonary hypertension  Neuropathy  Postoperative nausea and vomiting  Rheumatoid arthritis  Hyperlipidemia LDL goal <130  Lymphedema of both lower extremities  Cardiogenic shock and cardiac arrest 5/2017  Acute respiratory failure with hypoxia  Critical illness myopathy  Sepsis  Wound of left upper extremity  Diffuse large B-cell lymphoma of extranodal site  Febrile neutropenia  Physical deconditioning  Palpitations  Osteoporosis  -DEXA 2018   Slowly healing wound in medial left antecubital fossa after traumatic IV  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, latter felt from lymphoma   Neuropathy of right foot with weakness after intrathecal cytarabine  perioditis   Right arm PICC blood clots during cancer treatments  Right shoulder effusion when had pericarditis       Past Surgical History:  Anesthesia cardioversion  Right wrist arthrodesis, partial fusion \"history migrated out\"  Bone marrow aspirate & biopsy  Excise lesion upper extremity - left wound excision and closure, possible submuscular transposition of ulnar nerve  Foot surgery - 4 " left, 2 right  Carpal tunnel bilateral release  Repair ventricular septal defect  Transpositional ulnar nerve (elbow) left   Right shoulder effusion history     Family History:   No autoimmune disorders, psoriasis, UC, crohn's, SLE, RA, PsA, gout, autoimmune thyroid.  No MS, heart disease in family  Mother - breast cancer, hypertension, alzheimer disease-passed   Father - hypertension, lymphoma, circulatory A fib-passed  Paternal grandmother - diabetes  Siblings-younger sister heatlhy PB, younger brother think arthritis not dx PB     Social History:   No smoking or tobacco use. No alcohol use. No IVDU. None Children. Right handed. . Disability          ROS:    A comprehensive ROS was done. Positives are per HPI.      Ph. E:    /83   Pulse 84   Wt 56.2 kg (124 lb)   SpO2 95%   BMI 26.83 kg/m      Constitutional: NAD, pleasant, ambulates with a cane  Eyes: Normal EOM, conjunctiva, sclera  ENT: no oral ulcers  Neck: No mass or thyroid enlargement  Chest: old sternotomy scar, pacemaker implanted on left chest wall, good perfusion. CTAB  CV: no M/R/G, irregular HR (Afib)  Abdomen: soft, NT  Lymph: No cervical, supraclavicular nodes  MS: Ulnar deviation of hands. Evidence of mild active synovitis left dorsal wrist. Chronic RA and surgical deformities. R ankle in brace.  Skin: No rash  Neuro: grossly non focal  Psych: Normal affect.     Labs/Imaging:  Eye Exam (10/23/18)  Significant for mild H-lated nuclear cataracts.   Ocular CT was recommended for right eye.     CT Chest/Abdomen/Pelvis (9/26/18)  In this patient with a history of lymphoma:  1. No evidence of disease recurrence, consistent with complete  response per Lugano criteria.  2. Stable cardiomegaly. Improved pericardial effusion.  3. Early contrast phase with heterogeneous visceral enhancement in the  abdomen, likely secondary to cardiac dysfunction.    MRI Cardiac w/contrast (4/12/18)  Loculated exudative pericardial effusion that is  beginning to organize, with diffuse  pericardial enhancement consistent with ongoing inflammation. Normal LV fxn, LVEF 54% and RVEF 49%. At  least moderate, possibly severe tricuspid regurgitation. Compared to MRI from 03/2018, effusion is largely  unchanged in size (maybe a bit smaller) but is starting to organize. No change in pericardial enhancement.    Laboratory:   Component      Latest Ref Rng & Units 3/25/2021 5/26/2021 6/23/2021 7/21/2021   WBC      4.0 - 11.0 thou/uL 5.6      RBC Count      3.80 - 5.40 mill/uL 4.31      Hemoglobin      12.0 - 16.0 g/dL 15.4      Hematocrit      35.0 - 47.0 % 44.6      MCV      80 - 100 fL 104 (H)      MCH      27.0 - 34.0 pg 35.7 (H)      MCHC      32.0 - 36.0 g/dL 34.5      RDW      11.0 - 14.5 % 14.4      Platelet Count      140 - 440 thou/uL 126 (L)      Mean Platelet Volume      7.0 - 10.0 fL 9.5      % Neutrophils      50 - 70 % 80 (H)      % Lymphocytes      20 - 40 % 10 (L)      % Monocytes      2 - 10 % 9      % Eosinophils      0 - 6 % 1      % Basophils      0 - 2 % 0      % Immature Granulocytes      <=0 % 0      Absolute Neutrophils      2.0 - 7.7 thou/uL 4.5      Absolute Lymphocytes      0.8 - 4.4 thou/uL 0.6 (L)      Absolute Monocytes      0.0 - 0.9 thou/uL 0.5      Eosinophils Absolute      0.0 - 0.4 thou/uL 0.0      Absolute Basophils      0.0 - 0.2 thou/uL 0.0      Absolute Immature Granulocytes      <=0.0 thou/uL 0.0      Sodium      133 - 144 mmol/L  136 136 137   Potassium      3.4 - 5.3 mmol/L  3.6 4.2 3.9   Chloride      94 - 109 mmol/L  101 102 103   Carbon Dioxide      20 - 32 mmol/L  24 28 28   Anion Gap      3 - 14 mmol/L  11 6 6   Glucose      70 - 99 mg/dL  100 (H) 98 110 (H)   Urea Nitrogen      7 - 30 mg/dL  21 25 29   Creatinine      0.52 - 1.25 mg/dL  1.00 1.05 (H) 1.02   GFR Estimate      >60 mL/min/1.73m2  61 57 (L) 60 (L)   GFR Estimate If Black      >60 mL/min/1.73:m2  71 67    Calcium      8.5 - 10.1 mg/dL  9.3 10.0 9.6   Bilirubin  Direct      0.0 - 0.2 mg/dL   0.5 (H)    Bilirubin Total      0.2 - 1.3 mg/dL   1.2    Albumin      3.4 - 5.0 g/dL   4.2    Protein Total      6.8 - 8.8 g/dL   7.5    Alkaline Phosphatase      40 - 150 U/L   136    ALT      0 - 50 U/L   41    AST      0 - 45 U/L   36    N-Terminal Pro Bnp      0 - 125 pg/mL  1,274 (H)     Hemoglobin A1C      0 - 5.6 %  6.2 (H)     Immunoglobulin G      700-1,700 mg/dL 1,179          Impression/Plan:  1. Seropositive rheumatoid arthritis (, RF 31) with multiple joint disability including MTP fusion 1-5 bilaterally, partial right wrist fusion, subluxation and ulnar deformity of MCP's. Rheumatoid arthritis (RA) controlled. Continue prednisone at 3 mg every day (more pain, stiffness off prednisone when she was off), and hydroxychloroquine (plaquenil) 200 mg once a day. Rheumatoid arthritis does not seem to be flaring today, however her joint deformities are beginning to affect her quality of life due to reduced dexterity. Given her history of Lymphoma would stay away from TNF inhibitors. Will consider Actemra for better control. Will order screening labs today and refer to pain management in the interim.     2. Long-term hydroxychloroquine (plaquenil) use since 6-2017, no toxicity so far, reviewed AAO guidelines, repeat every year.    3. Diffuse large B-cell lymphoma status-post 1 cycle R-EPOCH, 6 cycles R-CHOP; Neuropathy of lower extremities attributed to lymphoma improved on gabapentin 200 mg TID-tolerating. Loculated pericardial effusion, etiology unclear (lymphoma). No symptoms now. Last saw Oncology 09/2023 with good report.    4. Osteoporosis, chronic long-term corticosteroid use with cushingoid appearance. Received 6-7 years of alendronate in early 2000s. Restarted alendronate in 1/2019, off 3-2020 to 9-2020 then restarted 9-2020. DEXA  T-2.6 thoracic. If > 5 yr higher risk atypical Fx. Off fosamax at last visit given improvement of bone density in 3/2021 but on  chronic steroid tx, did refer to endocrinology to manage steroid induced osteoporosis and endocrinology resumed it in 11/2021. Calcium and GFR normal . On Fosamax now, planning to switch to Reclast on 09/25/2023 6/28/2024:  Active RA, significant hand pain affecting quality of life. Recommend adding actemra; risks were discussed.    Plan:  Continue HCQ 200mg every day with yearly eye exam  Continue prednisone 3 mg a day  Start actemra when it gets approved by insurance  Labs 1 month after start of actemra   Return in person in about 4 months     TODAY 10/23/24:  Patient has ongoing pain to bilateral hands and persistent morning stiffness. Exam with mild synovitis to left dorsal wrist. Has not noticed any clinical benefit with Actemra. Discussed options with patient, and she would prefer to switch to Orencia at this time. Reviewed slow mechanism of action for medication- would not anticipate full benefit for several months. Treatment options otherwise. Has not had benefit with SSZ. Would avoid agents that increase risk for lymphoma given history including Methotrexate, JAK2 inhibitors, anti-TNF. Discussed that Rituximab can decrease immune system, but it is a good alternative option given fda approved indication for lymphoma and she tolerated it in the past. Uncontrolled neuropathy also likely contributing to pain and will be addressed by PCP.     PLAN:  - Stop Actemra [id number]  - Start Orencia weekly; would not anticipate benefit for several months; risks were discussed  - Labs today as below  - Continue  mg daily with annual eye exam   - Continue Prednisone 3 mg daily   - Return in 4-5 months in person for reevaluation  - Consider increasing Gabapentin dose by PCP for neuropathic component     No orders of the defined types were placed in this encounter.    Seen and staffed with Dr. Domínguez.    Eli Dumont MD  [facility] Medicine, PGY-3      Attending Note: I saw and evaluated the  patient with Dr. Dumont. I agree with the assessment and plan.      TT 40 min was spent on date of the encounter doing chart review, history and exam, documentation and further activities as noted above. Any prior notes, outside records, laboratory results, and imaging studies were reviewed if relevant.      The longitudinal plan of care for the diagnosis(es)/condition(s) as documented were addressed during this visit. Due to the added complexity in care, I will continue to support Amelia in the subsequent management and with ongoing continuity of care.      Maribell Domínguez MD

## 2025-05-15 ENCOUNTER — PATIENT OUTREACH (OUTPATIENT)
Dept: ONCOLOGY | Facility: CLINIC | Age: 65
End: 2025-05-15

## 2025-05-15 NOTE — PROGRESS NOTES
[facility]: Cancer Care Short Note                                    Discussion with Patient:                                                        OUTBOUND CALL: RNCC called patient -- notified by leadership that STAT PET was approved, however we do not have anything earlier available prior to 5/21. She is understanding-- scheduling will call her if something sooner opens up.   Pt verbalizes understanding and has no other questions, comments, or concerns at this time.     Samia Calhoun, RN, MSN, OCN   RN Care Coordinator   [facility]   [address] [place] [address] [id number] [phone]

## 2025-05-19 ENCOUNTER — HOSPITAL ENCOUNTER (OUTPATIENT)
Dept: PET IMAGING | Facility: CLINIC | Age: 65
Discharge: HOME | End: 2025-05-19
Attending: INTERNAL MEDICINE
Payer: COMMERCIAL

## 2025-05-19 DIAGNOSIS — C83.398 DIFFUSE LARGE B-CELL LYMPHOMA OF EXTRANODAL SITE EXCLUDING SPLEEN AND OTHER SOLID ORGANS (H): ICD-10-CM

## 2025-05-19 PROCEDURE — 78816 PET IMAGE W/CT FULL BODY: CPT | Mod: PS

## 2025-05-19 PROCEDURE — 71260 CT THORAX DX C+: CPT

## 2025-05-19 PROCEDURE — A9552 F18 FDG: HCPCS | Performed by: INTERNAL MEDICINE

## 2025-05-19 PROCEDURE — 70491 CT SOFT TISSUE NECK W/DYE: CPT

## 2025-05-19 PROCEDURE — 78816 PET IMAGE W/CT FULL BODY: CPT | Mod: 26 | Performed by: RADIOLOGY

## 2025-05-19 PROCEDURE — 343N000001 HC RX 343 MED OP 636: Performed by: INTERNAL MEDICINE

## 2025-05-19 PROCEDURE — 74177 CT ABD & PELVIS W/CONTRAST: CPT | Mod: 26 | Performed by: RADIOLOGY

## 2025-05-19 PROCEDURE — 250N000011 HC RX IP 250 OP 636: Performed by: INTERNAL MEDICINE

## 2025-05-19 PROCEDURE — 70491 CT SOFT TISSUE NECK W/DYE: CPT | Mod: 26 | Performed by: RADIOLOGY

## 2025-05-19 PROCEDURE — 71260 CT THORAX DX C+: CPT | Mod: 26 | Performed by: RADIOLOGY

## 2025-05-19 RX ORDER — FLUDEOXYGLUCOSE F 18 200 MCI/ML
9.6-15 INJECTION, SOLUTION INTRAVENOUS ONCE
Status: COMPLETED | OUTPATIENT
Start: 2025-05-19 | End: 2025-05-19

## 2025-05-19 RX ORDER — IOPAMIDOL 755 MG/ML
10-135 INJECTION, SOLUTION INTRAVASCULAR ONCE
Status: COMPLETED | OUTPATIENT
Start: 2025-05-19 | End: 2025-05-19

## 2025-05-19 RX ADMIN — FLUDEOXYGLUCOSE F 18 10 MILLICURIE: 200 INJECTION, SOLUTION INTRAVENOUS at 10:44

## 2025-05-19 RX ADMIN — IOPAMIDOL 76 ML: 755 INJECTION, SOLUTION INTRAVENOUS at 11:44

## 2025-05-19 NOTE — PROGRESS NOTES
Virtual Visit Details    Type of service:  Video Visit   Video Start Time: 0904  Video End Time:0926    Originating Location (pt. Location): Home    Distant Location (provider location):  On-site  Platform used for Video Visit: Aniket                [facility]  Date of visit: May 20, 2025      Reason for Visit: follow up DLBCL      Oncology HPI:   1. Complicated patient with history of Rheumatoid Arthritis status post long term treatment with methotrexate with significant complicated course with cardiac arrest, admission with hypotension, sepsis, ICU stay and intubation with subsequent additional readmission from TCU in 6/2017 for FUO with extensive work-up with eventual finding of progression cytopenias with eventual lymphoma diagnosis  2. Bone Marrow Biopsy: 7/12/2017: Highly suspicious for involvement by B cell lymphoma with foci of large atypical CD20+ cells  3. PET CT 7/19/2017: Extensive activity: Right paratracheal lesion with SUV 28, many liver lesions with SUV 15, cardiac lesion intraarterial septum, numerous bone lesions.  4. 7/21 Liver Biopsy: Consistent with Diffuse Large B Cell Lymphoma non-germinal center phenotype  -- FISH for myc, bcl2, bcl6 negative for double-hit lymphoma  5. IPI based on Age < 60 (0 points) + PS 2 (1 + point) + Stage IV Disease (1 point) + Extranodal disease > 1 site (1 point) + elevated LDH (1 point) = 4 Poor Risk Disease  6. Cycle 1 given as R-EPOCH Day 1 = 7/27/2017  -- complications of transfusion dependence and need for acute rehab placement (likely more result of extensive hospital stays)  - LP negative for CNS involvement 8/23/2017  7. Cycle 2: Transition to R-CHOP Day 1 = 8/22/2017  - Day 15 high dose MTX for CNS prophylaxis  - complicated by significant fluid overload and PICC associated DVT  8. Cycle #3 Day 1 = 9/20/2017 Post Cycle 3 PET CR. Cycle 4 10/11/2017 + IT prophylaxis, Cycle 5 11/8/17, Cycle 6 11/29/2017 + IT prophylaxis  --  Post Treatment completion PET 1/15/2018: Complete Remission      Interval history:   Amelia is here for follow up  Plan was to review PET however not formally ready yet--   She denies new issues or B symptoms. Weight/ appetite stable. No recent recurrent infections. Energy is stable. She feels that she needs to lose some weight and move more.   No abdominal pain or discomfort. No change to digestion  Right upper back with persistently pruritic lesion (correlating with FDG spot on PET) that has been present for a long time.   Overall doing ok--       Current Outpatient Medications   Medication Sig Dispense Refill    Abatacept (ORENCIA) 125 MG/ML SOSY pre-filled syringe Inject 1 mL (125 mg) subcutaneously once a week. 4 mL 5    acetaminophen (TYLENOL) 325 MG tablet Take 3 tablets (975 mg) by mouth every 8 hours      aspirin (ASA) 81 MG chewable tablet 1 tablet (81 mg) by Oral or NG Tube route daily 30 tablet 2    calcium carbonate 600 mg-vitamin D 400 units (CALTRATE) 600-400 MG-UNIT per tablet Take 2 tablets by mouth daily      dexAMETHasone (DECADRON) 4 MG/ML injection Inject 4 mg for adrenal crisis 2 mL 1    diltiazem ER (DILT-XR) 240 MG 24 hr ER beaded capsule Take 1 capsule (240 mg) by mouth daily. 90 capsule 3    docusate sodium (COLACE) 100 MG capsule Take 100 mg by mouth 2 times daily as needed for constipation      ELIQUIS ANTICOAGULANT 5 MG tablet TAKE 1 TABLET(5 MG) BY MOUTH TWICE DAILY 180 tablet 3    empagliflozin (JARDIANCE) 10 MG TABS tablet Take 1 tablet (10 mg) by mouth daily. 90 tablet 3    gabapentin (NEURONTIN) 100 MG capsule Take 1 capsule (100 mg) by mouth 2 times daily AND 2 capsules (200 mg) at bedtime. 360 capsule 3    hydroxychloroquine (PLAQUENIL) 200 MG tablet Take 1 tablet (200 mg) by mouth daily. 90 tablet 1    loratadine (CLARITIN) 10 MG tablet Take 10 mg by mouth daily as needed.      magnesium oxide 200 MG TABS Take 200 mg by mouth daily bedtime      potassium chloride timmy ER  (KLOR-CON M20) 20 MEQ CR tablet Take 3 tablets (60 mEq) by mouth daily 270 tablet 3    predniSONE (DELTASONE) 1 MG tablet Take 3 tablets (3 mg) by mouth daily - Oral 270 tablet 3    spironolactone (ALDACTONE) 25 MG tablet Take 0.5 tablets (12.5 mg) by mouth daily. 45 tablet 3    sulfamethoxazole-trimethoprim (BACTRIM DS) 800-160 MG tablet Take 1 tablet by mouth 2 times daily 4 tablet 5    torsemide (DEMADEX) 20 MG tablet Take 1 tablet (20 mg) by mouth daily. 90 tablet 3    Vitamin D (Cholecalciferol) 10 MCG (400 UNIT) TABS Take 1 tablet by mouth daily      zoledronic acid (RECLAST) 5 MG/100ML infusion 5 mg every year.         Allergies   Allergen Reactions    Blood Transfusion Related (Informational Only) Hives     Hives with platelets. Give benadryl premedication.    Amiodarone Other (See Comments) and Difficulty breathing     Lethargic and had trouble breathing- occurred in 2017    Metoprolol Other (See Comments)     Pt and  report that metoprolol does not work for her and also reports feeling unwell with this medication. She has been able to tolerate atenolol, which as worked in controlling her HR.     Penicillins Other (See Comments)     Childhood reaction, concern for tongue swelling    Tape [Adhesive Tape] Rash         Physical Exam:  Video physical exam  General: Patient appears well in no acute distress.   Skin: No visualized rash or lesions on visualized skin  Eyes: EOMI, no erythema, sclera icterus or discharge noted  Resp: Appears to be breathing comfortably without accessory muscle usage, speaking in full sentences, no cough  MSK: Appears to have normal range of motion based on visualized movements  Neurologic: No apparent tremors, facial movements symmetric  Psych: affect pleasant, alert and oriented    Wt Readings from Last 4 Encounters:   05/14/25 56.2 kg (124 lb)   04/10/25 56.5 kg (124 lb 9.6 oz)   04/02/25 56.5 kg (124 lb 9.6 oz)   02/05/25 55.6 kg (122 lb 9.6 oz)       Labs:     I  personally reviewed the following labs:    Most Recent 3 CBC's:  Recent Labs   Lab Test 05/12/25  1109 04/02/25  1017 12/11/24  1424   WBC 5.1 5.4 5.4   HGB 15.4 16.2 15.7   * 97 101*   * 132* 110*     Most Recent 3 BMP's:  Recent Labs   Lab Test 05/12/25  1109 05/02/25  1228 04/02/25  1017 12/11/24  1424     --  136 136   POTASSIUM 3.7  --  3.8 4.7   CHLORIDE 97*  --  97* 98   CO2 29  --  25 25   BUN 32.7*  --  30.6* 34.6*   CR 1.14 1.5* 1.11 1.27*   ANIONGAP 11  --  14 13   VIKTORIYA 9.6  --  10.1 9.5   GLC 95  --  103* 161*     Most Recent 2 LFT's:  Recent Labs   Lab Test 05/12/25  1109 04/02/25  1017   AST 36 36   ALT 23 20   ALKPHOS 99 96   BILITOTAL 0.8 0.8           Imaging:    Combined Report of: PET and CT on 5/19/2025 12:17 PM:      FOLLOW-UP APPOINTMENT: 5/20/2025     1. PET of the neck, chest, abdomen, and pelvis.  2. PET CT Fusion for Attenuation Correction and Anatomical  Localization.  3. Diagnostic CT of the neck, chest, abdomen and pelvis with  intravenous contrast obtained for diagnostic interpretation.  4. 3D MIP and PET-CT fused images were processed on an independent  workstation and archived to PACS and reviewed by a radiologist.     Technique:     1. PET: The patient received 10.1 mCi of F-18-FDG. The serum glucose  was 114 mg/dL prior to administration. Body weight was 56.2 kg. Images  were evaluated in the axial, sagittal, and coronal planes as well as  the rotational whole body MIP. Images were acquired from cranial  vertex to feet.     UPTAKE WAS MEASURED AT 63 MINUTES.      2. CT: Volumetric acquisition for clinical interpretation of the  chest, abdomen, and pelvis acquired at 3 mm sections. The chest,  abdomen, and pelvis were evaluated at 5 mm sections in bone, soft  tissue, and lung windows. High resolution images of the neck were  obtained with multiple oblique projection reformats.      Contrast and Medications:  IV contrast: 76 mL of Isovue 370 intravenously.  PO  contrast: None.  Additional Medications: None.     3. 3D MIP and PET-CT fused images were processed on an independent  workstation and archived to PACS and reviewed by a radiologist.     INDICATION: Hx of DLBCL in 2017 s/p chemo, recent TTE 4/30/25 showed a  6 x 3.3 cm mass adjacent to right ventricle. PET for further  evaluation; Diffuse large B-cell lymphoma of extranodal site excluding  spleen and other solid organs (H).     ADDITIONAL INFORMATION OBTAINED FROM EMR: 64-year-old patient with  diffuse large B-cell lymphoma diagnosed in 2017  status post  chemotherapy and has been in complete remission since. Echocardiogram  on 4/30/2025 demonstrated a mass adjacent to the right ventricle.  Concern for recurrence. Restaging scan.     COMPARISON: CT 5/2/2025, 9/10/2019. PET/CT 1/15/2018, 10/9/2017.  Cardiac MRI 4/12/2018.     FINDINGS:      BACKGROUND: Liver SUV max = 3.12, Aorta Blood SUV max = 2.35.         HEAD/NECK:     Symmetric uptake of the vocal folds, presumably physiologic.     Pharyngeal mucosal spaces: Nasopharynx and palatine tonsils are within  normal limits. Tongue base is normal. Oral tongue and buccal mucosa of  the oral cavity is normal. Oropharynx, hypopharynx and larynx are  within normal limits.     Sinonasal region: Paranasal sinuses are clear. No mass within the  nasal cavity.     Lymph nodes: No FDG avid or abnormally enlarged lymph nodes.     Salivary glands: The major salivary glands are within normal limits.      Thyroid gland: Unchanged subcentimeter hypoattenuating bilateral  thyroid nodules. Unchanged inferior extension of the right thyroid  lobe into the superior mediastinum versus accessory thyroid tissue.     Vascular structures: The major vasculature of the neck are patent.     Brain: No abnormal FDG avid lesion or abnormal enhancement.      Orbital cavities: No abnormal FDG avid lesion or abnormal enhancement.        CHEST:     Lymph nodes: No FDG avid or abnormally enlarged lymph  nodes.     Lungs: Stable 3 mm solid pulmonary nodule within the left upper lobe  (10/18) since at least 2019, statistically benign. No new or enlarging  pulmonary nodule. Linear subsegmental atelectasis within the right  middle lobe. The central tracheobronchial tree is clear. No acute  consolidation.  No pleural effusion or pneumothorax.      Heart and great vessels: Left chest wall cardiac pacing device and  leads are in stable positioning. Changes of CABG. Heart size is  enlarged. Non-FDG avid mild to moderate loculated pericardial effusion  measuring 6.6 x 6.3 x 2.6 cm (3/278 5/58) with pericardial  calcifications demonstrates complex density, this is been slowly  increasing in size dating back to PET/CT 1/15/2018. This was  previously described as a loculated pericardial effusion on cardiac  MRI 4/12/2018. Redemonstration of substernal calcifications. The  thoracic aorta and main pulmonary artery are within normal limits. The  esophagus is unremarkable.      Breasts: Mild focal FDG uptake within the 0.6 x 0.5 cm right inferior  subcutaneous breast (3/268) with SUV max of 3.7.     ABDOMEN AND PELVIS:     Liver: No FDG avid lesion. No significant change in heterogeneous  enhancement of the liver compared to prior CTs, mainly related to  passive congestion or cardiac dysfunction. Several too small to  characterize hepatic hypodensities. No intrahepatic or extrahepatic  biliary ductal dilatation. Gallbladder is within normal limits.      Pancreas: The pancreas is within normal limits. No pancreatic ductal  dilatation.      Spleen: No FDG avid lesion.     Adrenal glands: No FDG avid foci.     Kidneys: No FDG avid lesion. Simple density bilateral renal cysts and  additional too small to characterize renal hypodensities. No  hydronephrosis. The urinary bladder is unremarkable.      Reproductive organs are within normal limits.     Gastrointestinal system: Normal caliber of the small and large bowel.  Colonic  diverticulosis.     Lymph nodes: No FDG avid or abnormally enlarged lymph nodes.     Vascular structures: Normal caliber of the abdominal aorta.     No free air, free fluid, or fluid collection.      EXTREMITIES:      Within the subcutaneous and cutaneous tissues of the left upper arm is  a mild diffusely FDG avid 3 x 2.6 cm lesion (1/126) with SUV max of  2.6.    Right calcaneal orthopedic bolts.     BONES AND SOFT TISSUES:      No abnormal FDG uptake in the skeleton.      There is a non-FDG avid lytic lesion within the body of the sternum  which appears related to a sternotomy wire which is stable from CTA  5/2/2025 although new compared to CT 9/10/2019.     Possible surgical resection of the anterior right fourth rib. Healed  right anterior rib fractures.     Left hip arthroplasty. S-shaped curvature of the spine. Grade 1  retrolisthesis of L4 on L5 an C5 on C6.     Degenerative uptake about the right sternoclavicular joint and  throughout the spine particularly involving the interspinous soft  tissues and facet joints in the lumbar spine.     SPINAL CANAL:      No evident canal compromise. No abnormal FDG avid lesion.     Clinical Context: 64-year-old patient with diffuse large B-cell  lymphoma diagnosed in 2017  status post chemotherapy and has been in  complete remission since. Echocardiogram on 4/30/2025 demonstrated a  mass adjacent to the right ventricle. Concern for recurrence.                                                                       IMPRESSION:  In summary:   - Lymphoma - continued complete response  - Cardiac - chronic growing pericardial effusion, present since 2018  - Right breast - hypermetabolic nodule -> diagnostic mammogram     In detail:      1. Continued complete response per PET [place] criteria.     2. Cardiac -   2a. Effusion - Slowly increasing size of a non-FDG avid complex  density loculated pericardial effusion with peripheral calcification  dating back to at least PET/CT  1/15/2018. This is previously described  as loculated pericardial effusion on cardiac MRI 4/12/2028. Follow-up  per cardiology.  The effusion was not present on 10/9/17 and appears  to trace on 1/15/2018 and then grew to small on 9/26/2018.  Question  if this may be related to patient's rheumatoid arthritis.       2b. Calcified body - Non-FDG avid lytic lesion involving the body of  the sternum appears related to a sternotomy wire.  This is abutted by  a calcified body which has increased in size since 2006, suspect this  is secondary to history of VSD repair in 1969.  The calcified body  also indents the pulmonary artery outflow track.  2c. Prosthetic tricuspid valve.  Dilated right atrium.     3) Right breast - 0.6 cm mildly FDG avid nodule within the inferior  right breast, indeterminate. Recommend diagnostic evaluation with a  dedicated breast Center.  New from 9/10/19 CT.  Negative mammogram  12/10/22.       4. Left upper arm subcutaneous soft tissue tubular inflammation  suspect secondary to immunization 11/15/2024.         Impression/plan:     64 year old woman with history of rhematoid arthritis and history of stage 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 She continues in CR. No signs of disease recurrence or progression.     # DLBCL  s/p 1 REPOCH and 5 RCHOP. Now in complete remission. Last chemo 12/2017. No evidence of relapse by history or physical exam.    - Now with ?liver vs cardiac echodense mass adjacent to the right ventricle measuring 6 x 3.3 cm seen on recent echo prompting PET scan 5/19 -- no evidence of lymphoma. Will refer for US/ mammogram for right breast 0.6 mm mildly FDG avid nodule   - CTA did not show cardiac mass of note  - LDH mildly elevated 276 on 5/12 however has trended much higher over the past few years  - Symptomatically feeling well -- denies fevers/ night sweats/ recent infections. Weight/ appetite stable. Energy stable.   - Will refer to derm for R  upper back lesion and general skin check    # Rheumatoid arthritis  She follows with rheumatology Dr Segovia.    # A Fib with pacemaker  # Tricuspid Valve repair  Follows with cardiology. Is on optimal medical therapy. Has been trying to lose weight and increase exercise. Has been through cardiac rehab.    Tricuspid valve April 2023, completed cardiac rehab    # Neuropathy  Continues on gabapentin. Stable. No worsening but no significant improvement either            Zuleima Lin ACNP-BC    40 minutes spent on the date of the encounter doing chart review, review of test results, interpretation of tests, patient visit and documentation

## 2025-05-20 ENCOUNTER — VIRTUAL VISIT (OUTPATIENT)
Dept: ONCOLOGY | Facility: CLINIC | Age: 65
End: 2025-05-20
Attending: INTERNAL MEDICINE
Payer: COMMERCIAL

## 2025-05-20 VITALS — HEIGHT: 57 IN | BODY MASS INDEX: 26.6 KG/M2 | WEIGHT: 123.3 LBS

## 2025-05-20 DIAGNOSIS — C83.30 DIFFUSE LARGE B-CELL LYMPHOMA, UNSPECIFIED BODY REGION (H): Primary | ICD-10-CM

## 2025-05-20 DIAGNOSIS — N63.0 BREAST NODULE: ICD-10-CM

## 2025-05-20 PROCEDURE — 1125F AMNT PAIN NOTED PAIN PRSNT: CPT | Mod: 95 | Performed by: NURSE PRACTITIONER

## 2025-05-20 PROCEDURE — 98006 SYNCH AUDIO-VIDEO EST MOD 30: CPT | Performed by: NURSE PRACTITIONER

## 2025-05-20 ASSESSMENT — PAIN SCALES - GENERAL: PAINLEVEL_OUTOF10: MODERATE PAIN (4)

## 2025-05-20 NOTE — NURSING NOTE
Current patient location: [address] [place] [id number]    Is the patient currently in the state of [place]? YES    Visit mode: VIDEO    If the visit is dropped, the patient can be reconnected by:VIDEO VISIT: Text to cell phone:   Telephone Information:   Mobile [phone]    and VIDEO VISIT: Send to e-mail at: [email or web address]    Will anyone else be joining the visit? Romeo-Pt's     (If patient encounters technical issues they should call [phone])    Are changes needed to the allergy or medication list? Pt declined med review and Pt stated no med changes    Are refills needed on medications prescribed by this physician? NO    Rooming Documentation:  Questionnaire(s) completed    Reason for visit: RECHECK (Question about some medications )    Eli CAST

## 2025-05-20 NOTE — LETTER
5/20/2025      Amelia Michel  [address]  [place] [address]      Dear Colleague,    Thank you for referring your patient, Amelia Michel, to the [facility]. Please see a copy of my visit note below.    Virtual Visit Details    Type of service:  Video Visit   Video Start Time: 0904  Video End Time:0926    Originating Location (pt. Location): Home    Distant Location (provider location):  On-site  Platform used for Video Visit: [facility]  Date of visit: May 20, 2025      Reason for Visit: follow up DLBCL      Oncology HPI:   1. Complicated patient with history of Rheumatoid Arthritis status post long term treatment with methotrexate with significant complicated course with cardiac arrest, admission with hypotension, sepsis, ICU stay and intubation with subsequent additional readmission from TCU in 6/2017 for FUO with extensive work-up with eventual finding of progression cytopenias with eventual lymphoma diagnosis  2. Bone Marrow Biopsy: 7/12/2017: Highly suspicious for involvement by B cell lymphoma with foci of large atypical CD20+ cells  3. PET CT 7/19/2017: Extensive activity: Right paratracheal lesion with SUV 28, many liver lesions with SUV 15, cardiac lesion intraarterial septum, numerous bone lesions.  4. 7/21 Liver Biopsy: Consistent with Diffuse Large B Cell Lymphoma non-germinal center phenotype  -- FISH for myc, bcl2, bcl6 negative for double-hit lymphoma  5. IPI based on Age < 60 (0 points) + PS 2 (1 + point) + Stage IV Disease (1 point) + Extranodal disease > 1 site (1 point) + elevated LDH (1 point) = 4 Poor Risk Disease  6. Cycle 1 given as R-EPOCH Day 1 = 7/27/2017  -- complications of transfusion dependence and need for acute rehab placement (likely more result of extensive hospital stays)  - LP negative for CNS involvement 8/23/2017  7. Cycle 2: Transition to R-CHOP Day 1 = 8/22/2017  - Day 15  high dose MTX for CNS prophylaxis  - complicated by significant fluid overload and PICC associated DVT  8. Cycle #3 Day 1 = 9/20/2017 Post Cycle 3 PET CR. Cycle 4 10/11/2017 + IT prophylaxis, Cycle 5 11/8/17, Cycle 6 11/29/2017 + IT prophylaxis  -- Post Treatment completion PET 1/15/2018: Complete Remission      Interval history:   Amelia is here for follow up  Plan was to review PET however not formally ready yet--   She denies new issues or B symptoms. Weight/ appetite stable. No recent recurrent infections. Energy is stable. She feels that she needs to lose some weight and move more.   Overall doing ok--       Current Outpatient Medications   Medication Sig Dispense Refill     Abatacept (ORENCIA) 125 MG/ML SOSY pre-filled syringe Inject 1 mL (125 mg) subcutaneously once a week. 4 mL 5     acetaminophen (TYLENOL) 325 MG tablet Take 3 tablets (975 mg) by mouth every 8 hours       aspirin (ASA) 81 MG chewable tablet 1 tablet (81 mg) by Oral or NG Tube route daily 30 tablet 2     calcium carbonate 600 mg-vitamin D 400 units (CALTRATE) 600-400 MG-UNIT per tablet Take 2 tablets by mouth daily       dexAMETHasone (DECADRON) 4 MG/ML injection Inject 4 mg for adrenal crisis 2 mL 1     diltiazem ER (DILT-XR) 240 MG 24 hr ER beaded capsule Take 1 capsule (240 mg) by mouth daily. 90 capsule 3     docusate sodium (COLACE) 100 MG capsule Take 100 mg by mouth 2 times daily as needed for constipation       ELIQUIS ANTICOAGULANT 5 MG tablet TAKE 1 TABLET(5 MG) BY MOUTH TWICE DAILY 180 tablet 3     empagliflozin (JARDIANCE) 10 MG TABS tablet Take 1 tablet (10 mg) by mouth daily. 90 tablet 3     gabapentin (NEURONTIN) 100 MG capsule Take 1 capsule (100 mg) by mouth 2 times daily AND 2 capsules (200 mg) at bedtime. 360 capsule 3     hydroxychloroquine (PLAQUENIL) 200 MG tablet Take 1 tablet (200 mg) by mouth daily. 90 tablet 1     loratadine (CLARITIN) 10 MG tablet Take 10 mg by mouth daily as needed.       magnesium oxide 200 MG  TABS Take 200 mg by mouth daily bedtime       potassium chloride timmy ER (KLOR-CON M20) 20 MEQ CR tablet Take 3 tablets (60 mEq) by mouth daily 270 tablet 3     predniSONE (DELTASONE) 1 MG tablet Take 3 tablets (3 mg) by mouth daily - Oral 270 tablet 3     spironolactone (ALDACTONE) 25 MG tablet Take 0.5 tablets (12.5 mg) by mouth daily. 45 tablet 3     sulfamethoxazole-trimethoprim (BACTRIM DS) 800-160 MG tablet Take 1 tablet by mouth 2 times daily 4 tablet 5     torsemide (DEMADEX) 20 MG tablet Take 1 tablet (20 mg) by mouth daily. 90 tablet 3     Vitamin D (Cholecalciferol) 10 MCG (400 UNIT) TABS Take 1 tablet by mouth daily       zoledronic acid (RECLAST) 5 MG/100ML infusion 5 mg every year.         Allergies   Allergen Reactions     Blood Transfusion Related (Informational Only) Hives     Hives with platelets. Give benadryl premedication.     Amiodarone Other (See Comments) and Difficulty breathing     Lethargic and had trouble breathing- occurred in 2017     Metoprolol Other (See Comments)     Pt and  report that metoprolol does not work for her and also reports feeling unwell with this medication. She has been able to tolerate atenolol, which as worked in controlling her HR.      Penicillins Other (See Comments)     Childhood reaction, concern for tongue swelling     Tape [Adhesive Tape] Rash         Physical Exam:  Video physical exam  General: Patient appears well in no acute distress.   Skin: No visualized rash or lesions on visualized skin  Eyes: EOMI, no erythema, sclera icterus or discharge noted  Resp: Appears to be breathing comfortably without accessory muscle usage, speaking in full sentences, no cough  MSK: Appears to have normal range of motion based on visualized movements  Neurologic: No apparent tremors, facial movements symmetric  Psych: affect pleasant, alert and oriented    Wt Readings from Last 4 Encounters:   05/14/25 56.2 kg (124 lb)   04/10/25 56.5 kg (124 lb 9.6 oz)   04/02/25  56.5 kg (124 lb 9.6 oz)   02/05/25 55.6 kg (122 lb 9.6 oz)       Labs:     I personally reviewed the following labs:    Most Recent 3 CBC's:  Recent Labs   Lab Test 05/12/25  1109 04/02/25  1017 12/11/24  1424   WBC 5.1 5.4 5.4   HGB 15.4 16.2 15.7   * 97 101*   * 132* 110*     Most Recent 3 BMP's:  Recent Labs   Lab Test 05/12/25  1109 05/02/25  1228 04/02/25  1017 12/11/24  1424     --  136 136   POTASSIUM 3.7  --  3.8 4.7   CHLORIDE 97*  --  97* 98   CO2 29  --  25 25   BUN 32.7*  --  30.6* 34.6*   CR 1.14 1.5* 1.11 1.27*   ANIONGAP 11  --  14 13   VIKTORIYA 9.6  --  10.1 9.5   GLC 95  --  103* 161*     Most Recent 2 LFT's:  Recent Labs   Lab Test 05/12/25  1109 04/02/25  1017   AST 36 36   ALT 23 20   ALKPHOS 99 96   BILITOTAL 0.8 0.8           Imaging: n/a      Impression/plan:     64 year old woman with history of rhematoid arthritis and history of stage 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 She continues in CR. No signs of disease recurrence or progression.     # DLBCL  s/p 1 REPOCH and 5 RCHOP. Now in complete remission. Last chemo 12/2017. No evidence of relapse by history or physical exam.    - Now with ?liver vs cardiac echodense mass adjacent to the right ventricle measuring 6 x 3.3 cm seen on recent echo prompting PET scan 5/19 --   - CTA did not show cardiac mass of note  - LDH mildly elevated 276 on 5/12 however has trended much higher over the past few years  - Symptomatically feeling well -- denies fevers/ night sweats/ recent infections. Weight/ appetite stable. Energy stable.   - Will await PET results-- plan for biopsy if needed    # Rheumatoid arthritis  She follows with rheumatology Dr Segovia.    # A Fib with pacemaker  # Tricuspid Valve repair  Follows with cardiology. Is on optimal medical therapy. Has been trying to lose weight and increase exercise. Has been through cardiac rehab.    Tricuspid valve April 2023, completed cardiac rehab    #  Neuropathy  Continues on gabapentin. Stable. No worsening but no significant improvement either            Zuleima Lin ACNP-BC    40 minutes spent on the date of the encounter doing chart review, review of test results, interpretation of tests, patient visit and documentation     Again, thank you for allowing me to participate in the care of your patient.        Sincerely,        Zuleima Lin, ELIZABETH CNP    Electronically signed

## 2025-05-21 ENCOUNTER — TELEPHONE (OUTPATIENT)
Dept: ONCOLOGY | Facility: HOSPITAL | Age: 65
End: 2025-05-21
Payer: COMMERCIAL

## 2025-05-21 ENCOUNTER — PATIENT OUTREACH (OUTPATIENT)
Dept: CARE COORDINATION | Facility: CLINIC | Age: 65
End: 2025-05-21
Payer: COMMERCIAL

## 2025-05-21 NOTE — Clinical Note
SW talked with pt who denies need for oncology support/resources, pt's scans came back better than expected so pt coping well.

## 2025-05-21 NOTE — CONFIDENTIAL NOTE
Phoned pt per request: Pt reports given her scans came out well she is not currently interested in a therapy appt.     Oncology Distress Screening    Row Name 05/20/25 0836       How concerned do you feel regarding the following common issues people with cancer face. Please answer with a number from 0-10 where 0=not concerned and 10=very concerned. PT response of >=8  will result in outreach from Support Team.   1. How concerned are you about your ability to eat? 0       2. How concerned are you about unintended weight loss or your current weight? 3  Would like to loose some weight       3. How concerned are you about feeling depressed or very sad? 7       4. How concerned are you about feeling anxious or very scared? 3       5. Do you struggle with the loss of meaning and heather in your life? A great deal Abnormal        6. How concerned are you about work and home life issues that may be affected by your cancer? 0       7. How concerned are you about knowing what resources are available to help you? 3       8. Do you currently have what you would describe as Roman Catholic or spiritual struggles? Somewhat       You can also ask to be contacted by one of our Oncology Supportive Care professionals.   9. If you want to be contacted by one of our professionals, I can send a message to them right now. Oncology Social Worker;Oncology Dietitian;Oncology Psychologist

## 2025-05-21 NOTE — PROGRESS NOTES
Social Work - Distress Screen Intervention  [facility]    Identified Concern and Score from Distress Screenin. How concerned are you about your ability to eat? 0     2. How concerned are you about unintended weight loss or your current weight? 3 (Would like to loose some weight)     3. How concerned are you about feeling depressed or very sad?  7     4. How concerned are you about feeling anxious or very scared?  3     5. Do you struggle with the loss of meaning and heather in your life?  (!) A great deal     6. How concerned are you about work and home life issues that may be affected by your cancer?  0     7. How concerned are you about knowing what resources are available to help you?  3     8. Do you currently have what you would describe as Episcopalian or spiritual struggles? Somewhat     9. If you want to be contacted by one of our professionals, I can send a message to them right now.  Oncology Social Worker; Oncology Dietitian; Oncology Psychologist       Date of Distress Screen: 25  Data: At time of last visit, patient scored positive on distress screening.  outreached to patient today to follow up on elevated distress and introduce psychosocial services and support.  Intervention/Education provided:  contacted patient by phone to discuss distress screening results.  SW offered oncology support/resources to pt and she denies need due to the fact that her scans came back better than she expected.  As a result, pt denies needs at this time.  Pt aware of SW availability at [facility] if needed in the future.    Follow-up Required:  will remain available to patient for support as needed.    Eric Burks, Casual , for Barbara Molina   [facility]  [phone]

## 2025-05-22 ENCOUNTER — TELEPHONE (OUTPATIENT)
Dept: ONCOLOGY | Facility: CLINIC | Age: 65
End: 2025-05-22
Payer: COMMERCIAL

## 2025-05-22 DIAGNOSIS — I48.0 PAROXYSMAL ATRIAL FIBRILLATION (H): ICD-10-CM

## 2025-05-22 RX ORDER — APIXABAN 5 MG/1
5 TABLET, FILM COATED ORAL 2 TIMES DAILY
Qty: 180 TABLET | Refills: 3 | Status: SHIPPED | OUTPATIENT
Start: 2025-05-22

## 2025-05-22 NOTE — PROGRESS NOTES
CLINICAL NUTRITION SERVICES     Reason for Contact: Questions from Oncology Distress Screening  1. How concerned are you about your ability to eat? :  0  2. How concerned are you about unintended weight loss or your current weight? : 3  Would like to loose some weigh   9. If you want to be contacted by one of our professionals, I can send a message to them right now.: Oncology Social Worker;Oncology Dietitian;Oncology Psychologist     Action: RD called patient indicating reason for phone call. Left a VM with a return call back number.     Follow up: Wait for a return phone call.    Juanita Zafar RD, LD  [phone]

## 2025-05-23 ENCOUNTER — TELEPHONE (OUTPATIENT)
Dept: CARDIOLOGY | Facility: CLINIC | Age: 65
End: 2025-05-23
Payer: COMMERCIAL

## 2025-05-23 NOTE — TELEPHONE ENCOUNTER
Health Call Center    Phone Message    May a detailed message be left on voicemail: yes     Reason for Call: Appointment Intake    Referring Provider Name: Elmira Vasquez  Diagnosis and/or Symptoms:     Heart failure with preserved ejection fraction, NYHA class I (H) [I50.30]     Pt wants clarification regarding if GEN Card is most appropriate or HF team.    Pt stated she already sees Dr. Eaton and Elmira, so not sure if this is needed, but if so, needs at [facility].  Please call pt to schedule once clarified.    Action Taken: Message routed to:  [facility]    Travel Screening: Not Applicable     Date of Service:

## 2025-05-27 ENCOUNTER — TELEPHONE (OUTPATIENT)
Dept: CARDIOLOGY | Facility: CLINIC | Age: 65
End: 2025-05-27
Payer: COMMERCIAL

## 2025-05-27 NOTE — TELEPHONE ENCOUNTER
Called Amelia back. We reviewed that Dr. Eaton and Maria Esther are her EP team, and Elmira would like a general cardiologist to be established as well. Amelia is in agreement with this and will ask schedulers to reach out.

## 2025-05-27 NOTE — TELEPHONE ENCOUNTER
Patient Contacted for the patient to call back and schedule the following:    Appointment type: New Cardio  Provider: Dr. Yeo  Return date: 7/9/25  Specialty phone number: [phone] opt 1  Additional appointment(s) needed: NA  Additonal Notes: NA

## 2025-05-28 ENCOUNTER — HOSPITAL ENCOUNTER (OUTPATIENT)
Dept: ULTRASOUND IMAGING | Facility: CLINIC | Age: 65
Discharge: HOME | End: 2025-05-28
Attending: NURSE PRACTITIONER
Payer: COMMERCIAL

## 2025-05-28 ENCOUNTER — HOSPITAL ENCOUNTER (OUTPATIENT)
Dept: MAMMOGRAPHY | Facility: CLINIC | Age: 65
Discharge: HOME | End: 2025-05-28
Attending: NURSE PRACTITIONER
Payer: COMMERCIAL

## 2025-05-28 DIAGNOSIS — N63.0 BREAST NODULE: ICD-10-CM

## 2025-05-28 PROCEDURE — 76642 ULTRASOUND BREAST LIMITED: CPT | Mod: RT

## 2025-05-28 PROCEDURE — 77061 BREAST TOMOSYNTHESIS UNI: CPT | Mod: RT

## 2025-06-18 ENCOUNTER — HOSPITAL ENCOUNTER (OUTPATIENT)
Dept: ULTRASOUND IMAGING | Facility: CLINIC | Age: 65
Discharge: HOME | End: 2025-06-18
Attending: NURSE PRACTITIONER
Payer: COMMERCIAL

## 2025-06-18 DIAGNOSIS — N63.13 MASS OF LOWER OUTER QUADRANT OF RIGHT BREAST: ICD-10-CM

## 2025-06-18 PROCEDURE — 250N000009 HC RX 250: Performed by: RADIOLOGY

## 2025-06-18 PROCEDURE — 272N000431 US BREAST BIOPSY CORE NEEDLE RIGHT

## 2025-06-18 RX ADMIN — LIDOCAINE HYDROCHLORIDE 10 ML: 10 INJECTION, SOLUTION EPIDURAL; INFILTRATION; INTRACAUDAL; PERINEURAL at 10:13

## 2025-06-23 ENCOUNTER — TELEPHONE (OUTPATIENT)
Dept: MAMMOGRAPHY | Facility: CLINIC | Age: 65
End: 2025-06-23
Payer: COMMERCIAL

## 2025-06-23 ENCOUNTER — RESULTS FOLLOW-UP (OUTPATIENT)
Dept: MAMMOGRAPHY | Facility: CLINIC | Age: 65
End: 2025-06-23
Payer: COMMERCIAL

## 2025-06-23 NOTE — TELEPHONE ENCOUNTER
Call placed to Amelia regarding pathology results from RIGHT breast biopsy performed on 6/18/2025 revealing:     [facility]  Amelia Michel [id number]  ND, 1960  Flow Cytometry Report (Final result) [id number]  Authorizing Provider: Zuleima Lin APRN CNP Ordering Provider: Zuleima Lin APRN CNP  Ordering Location: [facility]  Imaging  Collected: 06/18/2025 10:58 AM  Pathologist: Harmony Patrick MD Received: 06/18/2025 11:14 AM  .  Specimens  A Breast, Right  .  .  Flow Interpretation  A. Breast, Right, Biopsy:  -B cells are rare to absent.  -See comment  Electronically signed by Harmony Patrick MD on 6/20/2025 at 1508 CDT  .  Comment  There is no immunophenotypic evidence of B-cell non-Hodgkin lymphoma.     Specimens  A Breast, Right  .  .  Final Diagnosis  A. Breast, right, 6:00, 5 cm from nipple (0.8 cm mass), needle core biopsy:  - No breast parenchyma identified.  - Fragments of benign fibrovascular tissue with focal presence of foreign body giant cell reaction to exogenous material.  - See comment.  Electronically signed by rTacie Morse MD on 6/20/2025 at 1254 CDT        Per [facility] Radiologist, Dr. Aleksander Alexandra, results are concordant with imaging findings.     Recommendation: Follow-up with oncologist for ongoing treatment/management  of diffuse large B-cell lymphoma.       Resume annual screening mammography, due approximately December 2025.    Results were viewed by Amelia on 6/20/25 at 6:17 PM and Recommendations released into My Chart. Ordering provider was informed of the results and follow up plan. Left message for Amelia to call [phone] with questions or concerns.      Radha You RN BSN  Procedure Nurse  [facility]  [phone]

## 2025-06-24 ENCOUNTER — OFFICE VISIT (OUTPATIENT)
Dept: URGENT CARE | Facility: URGENT CARE | Age: 65
End: 2025-06-24
Payer: COMMERCIAL

## 2025-06-24 VITALS
WEIGHT: 123 LBS | HEART RATE: 81 BPM | TEMPERATURE: 96.8 F | DIASTOLIC BLOOD PRESSURE: 79 MMHG | SYSTOLIC BLOOD PRESSURE: 122 MMHG | HEIGHT: 57 IN | OXYGEN SATURATION: 95 % | BODY MASS INDEX: 26.54 KG/M2 | RESPIRATION RATE: 18 BRPM

## 2025-06-24 DIAGNOSIS — R22.2 SWELLING OF PERINEAL TISSUE: Primary | ICD-10-CM

## 2025-06-24 PROCEDURE — 3078F DIAST BP <80 MM HG: CPT

## 2025-06-24 PROCEDURE — 99213 OFFICE O/P EST LOW 20 MIN: CPT

## 2025-06-24 PROCEDURE — 3074F SYST BP LT 130 MM HG: CPT

## 2025-06-24 RX ORDER — CEPHALEXIN 500 MG/1
500 CAPSULE ORAL 2 TIMES DAILY
Qty: 14 CAPSULE | Refills: 0 | Status: SHIPPED | OUTPATIENT
Start: 2025-06-24 | End: 2025-07-01

## 2025-06-24 NOTE — PROGRESS NOTES
Urgent Care Clinic Visit    Chief Complaint   Patient presents with    Urgent Care     Thinks she is having a medication reaction to Jardiance (has been on this for a couple of years for HF), having some peritoneal swelling x1-2 days-more on the right buttock area               6/24/2025    11:32 AM   Additional Questions   Roomed by Alana

## 2025-06-24 NOTE — PROGRESS NOTES
Assessment & Plan  Amelia was seen today for urgent care.    Diagnoses and all orders for this visit:    Swelling of perineal tissue  Medically complex 64-year-old presenting with a few days of swelling/fullness in the right gluteal region that she originally felt while wiping after a bowel movement.  She is a retired RN and was concerned about skin abscess versus early necrotizing fasciitis given that she is taking Jardiance.  She has had no pain, fever, or other systemic symptoms.  On exam, very subtle fullness of the perineal tissue with no mass, induration, crepitus, fluctuance, or overlying skin changes.  Discussed options for watchful waiting versus empiric antibiotic therapy and she preferred to at least have a prescription for an antibiotic if it is not getting better.  Discussed there is no indication to stop her Jardiance at this point in time.  If she feels that symptoms are worsening, she states she will return for reevaluation.  -     cephALEXin (KEFLEX) 500 MG capsule; Take 1 capsule (500 mg) by mouth 2 times daily for 7 days.    Estefany Harrison MD  [facility]    Subjective   Amelia is a 64 year old, presenting for the following health issues:  Urgent Care (Thinks she is having a medication reaction to Jardiance (has been on this for a couple of years for HF), having some peritoneal swelling x1-2 days-more on the right buttock area)    Yesterday was wiping and noticed more of a fullness on the right side  Doesn't feel warm, doesn't look red  The tissue doesn't feel the same on each side  No pain, no fever or sweats  Can even feel it sitting in the chair    Has been on jardiance for a few years - no UTIs or yeast infections, no other reactions  Hasn't had other skin abscesses  When had cardiac arrest in 2017 - ended up with a red rash - biopsied for crowley johnsons syndrome which was negative - wasn't raised, thought maybe a heat/fever rash, didn't itch - was in random  "places  No other medicine changes    Objective    /79 (BP Location: Right arm, Patient Position: Sitting, Cuff Size: Adult Regular)   Pulse 81   Temp 96.8  F (36  C) (Tympanic)   Resp 18   Ht 1.448 m (4' 9\")   Wt 55.8 kg (123 lb)   LMP  (LMP Unknown)   SpO2 95%   BMI 26.62 kg/m    Body mass index is 26.62 kg/m .  GEN: Patient sitting comfortably in no acute distress.  HEEN: Head is atraumatic, normocephalic, eyes anicteric, mucous membranes moist.  CV: Extremities well-perfused. No obvious lower extremity edema.  PULM: Breathing comfortably on room air. Speaking in full sentences.  NEURO: Alert and oriented x3.  No focal motor abnormalities.  Face symmetric.  PSYCH: Appropriate affect.  SKIN: No rashes, bruising, or other lesions visible on exposed skin. On perineal exam, there is subtle fullness of the soft tissue in the right gluteal fold. No palpable abscess, obvious induration of skin, or crepitus. No overlying skin changes. No tenderness to palpation.     "

## 2025-06-30 ENCOUNTER — DOCUMENTATION ONLY (OUTPATIENT)
Dept: ONCOLOGY | Facility: CLINIC | Age: 65
End: 2025-06-30
Payer: COMMERCIAL

## 2025-06-30 DIAGNOSIS — M06.9 RHEUMATOID ARTHRITIS INVOLVING BOTH HANDS, UNSPECIFIED WHETHER RHEUMATOID FACTOR PRESENT (H): Chronic | ICD-10-CM

## 2025-06-30 RX ORDER — ABATACEPT 125 MG/ML
125 INJECTION, SOLUTION SUBCUTANEOUS WEEKLY
Qty: 4 ML | Refills: 11 | Status: SHIPPED | OUTPATIENT
Start: 2025-06-30

## 2025-06-30 NOTE — TELEPHONE ENCOUNTER
Last Written Prescription:  Abatacept (ORENCIA) 125 MG/ML SOSY pre-filled syringe   4 mL 5 2/3/2025   ----------------------  Last Visit Date: 5-  Future Visit Date: 11-  ----------------------    Refill decision:   [x] Medication refilled per  Medication Refill in Ambulatory Care  policy.  [] Medication unable to be refilled by RN due to:     Request from pharmacy:  Requested Prescriptions   Pending Prescriptions Disp Refills    Abatacept (ORENCIA) 125 MG/ML SOSY pre-filled syringe 4 mL 5     Sig: Inject 1 mL (125 mg) subcutaneously once a week.       There is no refill protocol information for this order

## 2025-06-30 NOTE — PROGRESS NOTES
Called Amelia to review surg path from recent breast biopsy. From lymphoma standpoint this is reassuring and no action needed. She does have some discomfort here but notes that when she does laundry about every other day she presses the door of the washer underneath her breast due to her short stature. This spot isn't given a chance to heal as she repeats this several times each week. Other than this soreness she does not feel an obvious lump or anything like that. We discussed that we could at anytime repeat an ultrasound to start if she notices a change or lump here.     She will meet Dr Noriega in November. Until then she knows she can reach out to us at any time.     Zuliema Lin [facility]     Awake/Alert

## 2025-06-30 NOTE — TELEPHONE ENCOUNTER
[facility] Call Center    Phone Message    May a detailed message be left on voicemail: yes     Reason for Call: Medication Refill Request    Has the patient contacted the pharmacy for the refill? Yes   Name of medication being requested: Orencia pre-filled syringes  Provider who prescribed the medication: Catrachita  Pharmacy: [facility]  Date medication is needed: Routine       Action Taken: Other: Rheum    Travel Screening: Not Applicable

## 2025-07-02 ENCOUNTER — ANCILLARY PROCEDURE (OUTPATIENT)
Dept: CARDIOLOGY | Facility: CLINIC | Age: 65
End: 2025-07-02
Attending: INTERNAL MEDICINE
Payer: COMMERCIAL

## 2025-07-02 DIAGNOSIS — R00.1 BRADYCARDIA: ICD-10-CM

## 2025-07-02 LAB
MDC_IDC_EPISODE_DTM: NORMAL
MDC_IDC_EPISODE_DURATION: 0 S
MDC_IDC_EPISODE_DURATION: 0 S
MDC_IDC_EPISODE_DURATION: 1 S
MDC_IDC_EPISODE_DURATION: 106 S
MDC_IDC_EPISODE_DURATION: 1082 S
MDC_IDC_EPISODE_DURATION: 1100 S
MDC_IDC_EPISODE_DURATION: 1169 S
MDC_IDC_EPISODE_DURATION: 1198 S
MDC_IDC_EPISODE_DURATION: 1429 S
MDC_IDC_EPISODE_DURATION: 1488 S
MDC_IDC_EPISODE_DURATION: 157 S
MDC_IDC_EPISODE_DURATION: 166 S
MDC_IDC_EPISODE_DURATION: 1677 S
MDC_IDC_EPISODE_DURATION: 184 S
MDC_IDC_EPISODE_DURATION: 184 S
MDC_IDC_EPISODE_DURATION: 189 S
MDC_IDC_EPISODE_DURATION: 1924 S
MDC_IDC_EPISODE_DURATION: 196 S
MDC_IDC_EPISODE_DURATION: 201 S
MDC_IDC_EPISODE_DURATION: 212 S
MDC_IDC_EPISODE_DURATION: 215 S
MDC_IDC_EPISODE_DURATION: 220 S
MDC_IDC_EPISODE_DURATION: 251 S
MDC_IDC_EPISODE_DURATION: 262 S
MDC_IDC_EPISODE_DURATION: 2649 S
MDC_IDC_EPISODE_DURATION: 305 S
MDC_IDC_EPISODE_DURATION: 313 S
MDC_IDC_EPISODE_DURATION: 3162 S
MDC_IDC_EPISODE_DURATION: 3190 S
MDC_IDC_EPISODE_DURATION: 3288 S
MDC_IDC_EPISODE_DURATION: 329 S
MDC_IDC_EPISODE_DURATION: 333 S
MDC_IDC_EPISODE_DURATION: 341 S
MDC_IDC_EPISODE_DURATION: 363 S
MDC_IDC_EPISODE_DURATION: 370 S
MDC_IDC_EPISODE_DURATION: 3742 S
MDC_IDC_EPISODE_DURATION: 412 S
MDC_IDC_EPISODE_DURATION: 418 S
MDC_IDC_EPISODE_DURATION: 425 S
MDC_IDC_EPISODE_DURATION: 4341 S
MDC_IDC_EPISODE_DURATION: 447 S
MDC_IDC_EPISODE_DURATION: 4573 S
MDC_IDC_EPISODE_DURATION: 4653 S
MDC_IDC_EPISODE_DURATION: 509 S
MDC_IDC_EPISODE_DURATION: 534 S
MDC_IDC_EPISODE_DURATION: 5443 S
MDC_IDC_EPISODE_DURATION: 5849 S
MDC_IDC_EPISODE_DURATION: 779 S
MDC_IDC_EPISODE_DURATION: 801 S
MDC_IDC_EPISODE_DURATION: 8846 S
MDC_IDC_EPISODE_DURATION: 983 S
MDC_IDC_EPISODE_DURATION: 999 S
MDC_IDC_EPISODE_DURATION: NORMAL S
MDC_IDC_EPISODE_ID: NORMAL
MDC_IDC_EPISODE_TYPE: NORMAL
MDC_IDC_LEAD_CONNECTION_STATUS: NORMAL
MDC_IDC_LEAD_CONNECTION_STATUS: NORMAL
MDC_IDC_LEAD_IMPLANT_DT: NORMAL
MDC_IDC_LEAD_IMPLANT_DT: NORMAL
MDC_IDC_LEAD_LOCATION: NORMAL
MDC_IDC_LEAD_LOCATION: NORMAL
MDC_IDC_LEAD_LOCATION_DETAIL_1: NORMAL
MDC_IDC_LEAD_LOCATION_DETAIL_1: NORMAL
MDC_IDC_LEAD_MFG: NORMAL
MDC_IDC_LEAD_MFG: NORMAL
MDC_IDC_LEAD_MODEL: NORMAL
MDC_IDC_LEAD_MODEL: NORMAL
MDC_IDC_LEAD_POLARITY_TYPE: NORMAL
MDC_IDC_LEAD_POLARITY_TYPE: NORMAL
MDC_IDC_LEAD_SERIAL: NORMAL
MDC_IDC_LEAD_SERIAL: NORMAL
MDC_IDC_LEAD_SPECIAL_FUNCTION: NORMAL
MDC_IDC_LEAD_SPECIAL_FUNCTION: NORMAL
MDC_IDC_MSMT_BATTERY_DTM: NORMAL
MDC_IDC_MSMT_BATTERY_REMAINING_LONGEVITY: 85 MO
MDC_IDC_MSMT_BATTERY_RRT_TRIGGER: 2.62
MDC_IDC_MSMT_BATTERY_STATUS: NORMAL
MDC_IDC_MSMT_BATTERY_VOLTAGE: 2.97 V
MDC_IDC_MSMT_LEADCHNL_RA_IMPEDANCE_VALUE: 285 OHM
MDC_IDC_MSMT_LEADCHNL_RA_IMPEDANCE_VALUE: 380 OHM
MDC_IDC_MSMT_LEADCHNL_RA_PACING_THRESHOLD_AMPLITUDE: 0.5 V
MDC_IDC_MSMT_LEADCHNL_RA_PACING_THRESHOLD_PULSEWIDTH: 0.4 MS
MDC_IDC_MSMT_LEADCHNL_RA_SENSING_INTR_AMPL: 2 MV
MDC_IDC_MSMT_LEADCHNL_RV_IMPEDANCE_VALUE: 418 OHM
MDC_IDC_MSMT_LEADCHNL_RV_IMPEDANCE_VALUE: 513 OHM
MDC_IDC_MSMT_LEADCHNL_RV_PACING_THRESHOLD_AMPLITUDE: 0.62 V
MDC_IDC_MSMT_LEADCHNL_RV_PACING_THRESHOLD_PULSEWIDTH: 0.4 MS
MDC_IDC_MSMT_LEADCHNL_RV_SENSING_INTR_AMPL: 14.88 MV
MDC_IDC_PG_IMPLANT_DTM: NORMAL
MDC_IDC_PG_MFG: NORMAL
MDC_IDC_PG_MODEL: NORMAL
MDC_IDC_PG_SERIAL: NORMAL
MDC_IDC_PG_TYPE: NORMAL
MDC_IDC_SESS_CLINIC_NAME: NORMAL
MDC_IDC_SESS_DTM: NORMAL
MDC_IDC_SESS_TYPE: NORMAL
MDC_IDC_SET_BRADY_AT_MODE_SWITCH_RATE: 171 {BEATS}/MIN
MDC_IDC_SET_BRADY_HYSTRATE: NORMAL
MDC_IDC_SET_BRADY_LOWRATE: 75 {BEATS}/MIN
MDC_IDC_SET_BRADY_MAX_SENSOR_RATE: 130 {BEATS}/MIN
MDC_IDC_SET_BRADY_MAX_TRACKING_RATE: 130 {BEATS}/MIN
MDC_IDC_SET_BRADY_MODE: NORMAL
MDC_IDC_SET_BRADY_PAV_DELAY_LOW: 180 MS
MDC_IDC_SET_BRADY_SAV_DELAY_LOW: 150 MS
MDC_IDC_SET_LEADCHNL_RA_PACING_AMPLITUDE: 1.5 V
MDC_IDC_SET_LEADCHNL_RA_PACING_ANODE_ELECTRODE_1: NORMAL
MDC_IDC_SET_LEADCHNL_RA_PACING_ANODE_LOCATION_1: NORMAL
MDC_IDC_SET_LEADCHNL_RA_PACING_CAPTURE_MODE: NORMAL
MDC_IDC_SET_LEADCHNL_RA_PACING_CATHODE_ELECTRODE_1: NORMAL
MDC_IDC_SET_LEADCHNL_RA_PACING_CATHODE_LOCATION_1: NORMAL
MDC_IDC_SET_LEADCHNL_RA_PACING_POLARITY: NORMAL
MDC_IDC_SET_LEADCHNL_RA_PACING_PULSEWIDTH: 0.4 MS
MDC_IDC_SET_LEADCHNL_RA_SENSING_ANODE_ELECTRODE_1: NORMAL
MDC_IDC_SET_LEADCHNL_RA_SENSING_ANODE_LOCATION_1: NORMAL
MDC_IDC_SET_LEADCHNL_RA_SENSING_CATHODE_ELECTRODE_1: NORMAL
MDC_IDC_SET_LEADCHNL_RA_SENSING_CATHODE_LOCATION_1: NORMAL
MDC_IDC_SET_LEADCHNL_RA_SENSING_POLARITY: NORMAL
MDC_IDC_SET_LEADCHNL_RA_SENSING_SENSITIVITY: 0.3 MV
MDC_IDC_SET_LEADCHNL_RV_PACING_AMPLITUDE: 2 V
MDC_IDC_SET_LEADCHNL_RV_PACING_ANODE_ELECTRODE_1: NORMAL
MDC_IDC_SET_LEADCHNL_RV_PACING_ANODE_LOCATION_1: NORMAL
MDC_IDC_SET_LEADCHNL_RV_PACING_CAPTURE_MODE: NORMAL
MDC_IDC_SET_LEADCHNL_RV_PACING_CATHODE_ELECTRODE_1: NORMAL
MDC_IDC_SET_LEADCHNL_RV_PACING_CATHODE_LOCATION_1: NORMAL
MDC_IDC_SET_LEADCHNL_RV_PACING_POLARITY: NORMAL
MDC_IDC_SET_LEADCHNL_RV_PACING_PULSEWIDTH: 0.4 MS
MDC_IDC_SET_LEADCHNL_RV_SENSING_ANODE_ELECTRODE_1: NORMAL
MDC_IDC_SET_LEADCHNL_RV_SENSING_ANODE_LOCATION_1: NORMAL
MDC_IDC_SET_LEADCHNL_RV_SENSING_CATHODE_ELECTRODE_1: NORMAL
MDC_IDC_SET_LEADCHNL_RV_SENSING_CATHODE_LOCATION_1: NORMAL
MDC_IDC_SET_LEADCHNL_RV_SENSING_POLARITY: NORMAL
MDC_IDC_SET_LEADCHNL_RV_SENSING_SENSITIVITY: 0.9 MV
MDC_IDC_SET_ZONE_DETECTION_INTERVAL: 350 MS
MDC_IDC_SET_ZONE_DETECTION_INTERVAL: 400 MS
MDC_IDC_SET_ZONE_STATUS: NORMAL
MDC_IDC_SET_ZONE_STATUS: NORMAL
MDC_IDC_SET_ZONE_TYPE: NORMAL
MDC_IDC_SET_ZONE_VENDOR_TYPE: NORMAL
MDC_IDC_STAT_AT_BURDEN_PERCENT: 6.2 %
MDC_IDC_STAT_AT_DTM_END: NORMAL
MDC_IDC_STAT_AT_DTM_START: NORMAL
MDC_IDC_STAT_BRADY_AP_VP_PERCENT: 96.14 %
MDC_IDC_STAT_BRADY_AP_VS_PERCENT: 0.45 %
MDC_IDC_STAT_BRADY_AS_VP_PERCENT: 2.93 %
MDC_IDC_STAT_BRADY_AS_VS_PERCENT: 0.48 %
MDC_IDC_STAT_BRADY_DTM_END: NORMAL
MDC_IDC_STAT_BRADY_DTM_START: NORMAL
MDC_IDC_STAT_BRADY_RA_PERCENT_PACED: 91.65 %
MDC_IDC_STAT_BRADY_RV_PERCENT_PACED: 98.95 %
MDC_IDC_STAT_EPISODE_RECENT_COUNT: 0
MDC_IDC_STAT_EPISODE_RECENT_COUNT: 245
MDC_IDC_STAT_EPISODE_RECENT_COUNT: 9
MDC_IDC_STAT_EPISODE_RECENT_COUNT_DTM_END: NORMAL
MDC_IDC_STAT_EPISODE_RECENT_COUNT_DTM_START: NORMAL
MDC_IDC_STAT_EPISODE_TOTAL_COUNT: 0
MDC_IDC_STAT_EPISODE_TOTAL_COUNT: 403
MDC_IDC_STAT_EPISODE_TOTAL_COUNT: 5949
MDC_IDC_STAT_EPISODE_TOTAL_COUNT: 815
MDC_IDC_STAT_EPISODE_TOTAL_COUNT: NORMAL
MDC_IDC_STAT_EPISODE_TOTAL_COUNT_DTM_END: NORMAL
MDC_IDC_STAT_EPISODE_TOTAL_COUNT_DTM_START: NORMAL
MDC_IDC_STAT_EPISODE_TYPE: NORMAL
MDC_IDC_STAT_TACHYTHERAPY_RECENT_DTM_END: NORMAL
MDC_IDC_STAT_TACHYTHERAPY_RECENT_DTM_START: NORMAL
MDC_IDC_STAT_TACHYTHERAPY_TOTAL_DTM_END: NORMAL
MDC_IDC_STAT_TACHYTHERAPY_TOTAL_DTM_START: NORMAL

## 2025-07-02 PROCEDURE — 93294 REM INTERROG EVL PM/LDLS PM: CPT | Performed by: INTERNAL MEDICINE

## 2025-07-02 PROCEDURE — 93296 REM INTERROG EVL PM/IDS: CPT

## 2025-07-09 ENCOUNTER — OFFICE VISIT (OUTPATIENT)
Dept: CARDIOLOGY | Facility: CLINIC | Age: 65
End: 2025-07-09
Attending: HOSPITALIST
Payer: COMMERCIAL

## 2025-07-09 VITALS
OXYGEN SATURATION: 95 % | HEART RATE: 92 BPM | SYSTOLIC BLOOD PRESSURE: 118 MMHG | DIASTOLIC BLOOD PRESSURE: 76 MMHG | WEIGHT: 125.1 LBS | BODY MASS INDEX: 27.07 KG/M2

## 2025-07-09 DIAGNOSIS — I50.30 HEART FAILURE WITH PRESERVED EJECTION FRACTION, NYHA CLASS I (H): ICD-10-CM

## 2025-07-09 DIAGNOSIS — I48.0 PAROXYSMAL ATRIAL FIBRILLATION (H): ICD-10-CM

## 2025-07-09 DIAGNOSIS — Z95.4 S/P TVR (TRICUSPID VALVE REPLACEMENT): Primary | ICD-10-CM

## 2025-07-09 PROCEDURE — 99213 OFFICE O/P EST LOW 20 MIN: CPT | Performed by: HOSPITALIST

## 2025-07-09 ASSESSMENT — PAIN SCALES - GENERAL: PAINLEVEL_OUTOF10: NO PAIN (0)

## 2025-07-09 NOTE — PATIENT INSTRUCTIONS
It was a pleasure to see you in the cardiology clinic today.    If you have any questions, call Archana Rajput RN, at [phone].     [facility].  To schedule an appointment or to leave a message for your Care Team Press #1  If you are a physician calling for another physician Press #2  For Billing Press #3  For Medical Records Press #4  We are encouraging the use of Yotpot to communicate with your HealthCare Provider    PATIENT INSTRUCTIONS:    TESTS:  Echocardiogram      Please follow up with Dr. Yeo in 6 months

## 2025-07-09 NOTE — PROGRESS NOTES
[facility]  CARDIOVASCULAR MEDICINE CLINIC NOTE    Referring Provider: Ilhwan Yeo   Primary Care Provider: Gala Stringer     Patient Name: Amelia Michel   MRN: [id number]     PERTINENT CLINICAL HISTORY:   Amelia Michel is a 64 year old adult with a history significant for VSD s/p repair 1969, RA, HTN, insomnia, atrial tachyarrhythmias, cardiac arrest May 2017, SSS s/p PPM 12/2019, DLBCL, VT s/p VT ablation 12/1/2017 and exudative pericardial effusion, and severe TR s/p TVT 4/10/23 presents to establish the general cardiology care.   Recently, had TTE at OSH which showed mass-like findings in pericardium and subsequently underwent extensive workup including PET scan, breast biopsy which are all negative for significant pathology.    Today, patient denies any acute complaints.  /76 mmHg, HR 92 bpm, SpO2 95%.    Last visit with EP clinic was in 1/2025.    SH: lives with , no smoking, ETOH (3-4 times per week, wine), illicit drugs, not very active due to neuropathy and arthritis    Labs: A1c 5.2 (12/2024), LDL 90 (9/2024).     Meds:  Eliquis, ASA 81mg po daily,  Cardizem 240mg po daily, Jardiance 10mg po daily,   Spironolactone 12.5mg po daily, Torsemide 20mg po daily, Prednisone 3mg po daily.     PAST MEDICAL HISTORY:     Past Medical History:   Diagnosis Date    Acute on chronic diastolic heart failure (H) 11/19/2022    Antiplatelet or antithrombotic long-term use     Arrhythmia     Arthritis     Atrial fibrillation (H)     Atrial flutter (H)     Bradycardia 12/12/2019    Added automatically from request for surgery [id number]      Cancer (H) June 2017    DLBCL currently in remission    Cardiac arrest (H) 2017    Chronic kidney disease     Congestive heart failure (H)     Diffuse large B-cell lymphoma of extranodal site (H) 2017    Edema, unspecified type 01/24/2023    Extremity restricted from procedure     Left arm    Fall 12/06/2023    Cut on head    H/O blood transfusion reaction      Hives with platelet transfusion, needs pre-medication with tylenol and benadryl    Heart murmur     History of blood clots 2017    PICC line Right arm     History of chemotherapy     History of transfusion     Hypertension     Hypervolemia, unspecified hypervolemia type 01/24/2023    Lymphedema of both lower extremities     wears lymphedema socks    Neuropathy     Feet and hands    NSVT (nonsustained ventricular tachycardia) (H) 12/2022    Pacemaker 2019    Pericardial effusion 2017    Physical deconditioning     PONV (postoperative nausea and vomiting)     Positive PPD, 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    Prediabetes     Pulmonary hypertension (H)     RA (rheumatoid arthritis) (H)     SSS (sick sinus syndrome) (H) 2019    Pacemaker    Steroid-induced osteoporosis     Thrombocytopenia     VSD (ventricular septal defect)         PAST SURGICAL HISTORY:     Past Surgical History:   Procedure Laterality Date    ANESTHESIA CARDIOVERSION N/A 05/17/2017    Procedure: ANESTHESIA CARDIOVERSION;  ANESTHESIA CARDIOVERSION;  Surgeon: GENERIC ANESTHESIA PROVIDER;  Location: [facility]    ANESTHESIA CARDIOVERSION  03/12/2018    Procedure: ANESTHESIA CARDIOVERSION;;  Surgeon: GENERIC ANESTHESIA PROVIDER;  Location: [facility]    ANESTHESIA CARDIOVERSION N/A 03/23/2018    Procedure: ANESTHESIA CARDIOVERSION;  Anesthesia Cardioversion ;  Surgeon: GENERIC ANESTHESIA PROVIDER;  Location: [facility]    ANESTHESIA CARDIOVERSION N/A 04/12/2018    Procedure: ANESTHESIA CARDIOVERSION;  Cardioversion;  Surgeon: GENERIC ANESTHESIA PROVIDER;  Location: [facility] OR    ARTHRODESIS WRIST  2000    Right wrist    BONE MARROW ASPIRATE &BIOPSY  07/12/2017         BONE MARROW BIOPSY W/ ASPIRATION  07/12/2017    CARDIOVERSION      5/17/17, 3/12/18, 3/23/18, 4/14/18,     COLONOSCOPY N/A 11/19/2019    Procedure: Colonoscopy, With Polypectomy And Biopsy;  Surgeon: Pj Vasques MD;  Location: [facility]    CV CORONARY ANGIOGRAM N/A 11/28/2022    Procedure: Coronary Angiogram;  Surgeon:  Sundar Wood MD;  Location:  HEART CARDIAC CATH LAB    CV RIGHT HEART CATH MEASUREMENTS RECORDED N/A 11/28/2022    Procedure: Right Heart Catheterization;  Surgeon: Sundar Wood MD;  Location: [facility] CARDIAC CATH LAB    EP ABLATION PVC N/A 12/01/2022    Procedure: Ablation Premature Ventricular Contractions;  Surgeon: Juan Eaton MD;  Location:  HEART CARDIAC CATH LAB    EP PACEMAKER N/A 12/13/2019    Procedure: EP Pacemaker;  Surgeon: Pj Trent MD;  Location: [facility] CARDIAC CATH LAB    EXCISE LESION UPPER EXTREMITY Left 05/22/2018    Procedure: EXCISE LESION UPPER EXTREMITY;  Left Arm Wound Excision And Closure, Possible Submuscular Transposition of Ulnar Nerve  (Choice Anesthesia);  Surgeon: Kevin Sheldon MD;  [facility]    FOOT SURGERY      4 left and 2 right    FOOT SURGERY      4 Left and 2 Right     IMPLANT PACEMAKER  12/13/2019    Dr China Trent  [facility] Cardiac Cath lab     IMPLANT PACEMAKER  12/2019    [facility] CARDIAC SURG PROCEDURE UNLISTED  1969    vsd repair    [facility] HAND/FINGER SURGERY UNLISTED  2005?    right partial wrist fusion    RELEASE CARPAL TUNNEL Bilateral     REPAIR ATRIAL SEPTAL DEFECT  1969    VSD repair    REPAIR VALVE TRICUSPID MINIMALLY INVASIVE N/A 04/10/2023    Procedure: MINIMALLY INVASIVE TRICUSPID VALVE REPLACEMENT USING 29MM [employer] [place] EPIC VALVE, FEMORAL CANNULATION AND RIGHT GROIN CUTDOWN, CARDIOPULMONARY BYPASS, TRANSESOPHAGEAL ECHOCARDIOGRAM PERFORMED BY ANESTHESIA STAFF;  Surgeon: Chilango Cope MD;  Location:  OR    REPAIR VENTRICULAR SEPTAL DEFECT  1969    TOTAL HIP ARTHROPLASTY Left 01/24/2024    Procedure: LEFT TOTAL HIP ARTHROPLASTY DIRECT ANTERIOR APPROACH ORTHOGRID;  Surgeon: Romeo Quintana MD;  Location: [facility] OR    TRANSPOSITION ULNAR NERVE (ELBOW) Left 05/22/2018    Procedure: TRANSPOSITION ULNAR NERVE (ELBOW);;  Surgeon: Kevin Sheldon MD;  Location:  OR    VSD REPAIR  1969    ZZC FUSION FOOT  BONES,SUBTALAR Right 10/20/2020    Procedure: RIGHT SUBTALAR JOINT FUSION AND CALCANEOCUBOID FUSION;  Surgeon: Nemesio Crow MD;  Location: [facility] OR;  Service: Orthopedics        CURRENT MEDICATIONS:     Current Outpatient Medications   Medication Sig Dispense Refill    Abatacept (ORENCIA) 125 MG/ML SOSY pre-filled syringe Inject 1 mL (125 mg) subcutaneously once a week. 4 mL 11    acetaminophen (TYLENOL) 325 MG tablet Take 3 tablets (975 mg) by mouth every 8 hours      aspirin (ASA) 81 MG chewable tablet 1 tablet (81 mg) by Oral or NG Tube route daily 30 tablet 2    calcium carbonate 600 mg-vitamin D 400 units (CALTRATE) 600-400 MG-UNIT per tablet Take 2 tablets by mouth daily      dexAMETHasone (DECADRON) 4 MG/ML injection Inject 4 mg for adrenal crisis 2 mL 1    diltiazem ER (DILT-XR) 240 MG 24 hr ER beaded capsule Take 1 capsule (240 mg) by mouth daily. 90 capsule 3    ELIQUIS ANTICOAGULANT 5 MG tablet TAKE 1 TABLET(5 MG) BY MOUTH TWICE DAILY 180 tablet 3    empagliflozin (JARDIANCE) 10 MG TABS tablet Take 1 tablet (10 mg) by mouth daily. 90 tablet 3    gabapentin (NEURONTIN) 100 MG capsule Take 1 capsule (100 mg) by mouth 2 times daily AND 2 capsules (200 mg) at bedtime. 360 capsule 3    hydroxychloroquine (PLAQUENIL) 200 MG tablet Take 1 tablet (200 mg) by mouth daily. 90 tablet 1    loratadine (CLARITIN) 10 MG tablet Take 10 mg by mouth daily as needed.      magnesium oxide 200 MG TABS Take 200 mg by mouth daily bedtime      potassium chloride timmy ER (KLOR-CON M20) 20 MEQ CR tablet Take 3 tablets (60 mEq) by mouth daily (Patient taking differently: Take 20 mEq by mouth 3 times daily.) 270 tablet 3    predniSONE (DELTASONE) 1 MG tablet Take 3 tablets (3 mg) by mouth daily - Oral 270 tablet 3    spironolactone (ALDACTONE) 25 MG tablet Take 0.5 tablets (12.5 mg) by mouth daily. 45 tablet 3    torsemide (DEMADEX) 20 MG tablet Take 1 tablet (20 mg) by mouth daily. 90 tablet 3    Vitamin D  (Cholecalciferol) 10 MCG (400 UNIT) TABS Take 1 tablet by mouth daily      zoledronic acid (RECLAST) 5 MG/100ML infusion 5 mg every year.      docusate sodium (COLACE) 100 MG capsule Take 100 mg by mouth 2 times daily as needed for constipation (Patient not taking: Reported on 7/9/2025)      sulfamethoxazole-trimethoprim (BACTRIM DS) 800-160 MG tablet Take 1 tablet by mouth 2 times daily (Patient not taking: Reported on 7/9/2025) 4 tablet 5        ALLERGIES:     Allergies   Allergen Reactions    Blood Transfusion Related (Informational Only) Hives     Hives with platelets. Give benadryl premedication.    Amiodarone Other (See Comments) and Difficulty breathing     Lethargic and had trouble breathing- occurred in 2017    Metoprolol Other (See Comments)     Pt and  report that metoprolol does not work for her and also reports feeling unwell with this medication. She has been able to tolerate atenolol, which as worked in controlling her HR.     Penicillins Other (See Comments)     Childhood reaction, concern for tongue swelling    Tape [Adhesive Tape] Rash        FAMILY HISTORY:     Family History   Problem Relation Age of Onset    Breast Cancer Mother         60s    Hypertension Mother     Alzheimer Disease Mother     Cerebrovascular Disease Mother     Hypertension Father     Cerebrovascular Disease Father     Atrial fibrillation Father     Lymphoma Father     Prostate Cancer Father     Other Cancer Father         some form of Lymphoma    Alzheimer Disease Maternal Grandmother         likely    Arthritis Maternal Grandfather     Pneumonia Maternal Grandfather     Diabetes Paternal Grandmother     Mental Illness Sister         early onset  alzheimers        SOCIAL HISTORY:     Social History     Socioeconomic History    Marital status:      Spouse name: Romeo Michel    Number of children: 0    Years of education: None    Highest education level: None   Occupational History     Employer: [employer]   Tobacco Use    Smoking status: Never    Smokeless tobacco: Never   Vaping Use    Vaping status: Never Used   Substance and Sexual Activity    Alcohol use: Yes     Comment: 2 drinks per week    Drug use: Never    Sexual activity: Not Currently     Partners: Male     Birth control/protection: Post-menopausal   Other Topics Concern    Parent/sibling w/ CABG, MI or angioplasty before 65F 55M? No   Social History Narrative    Retired RN.     Social Drivers of Health     Financial Resource Strain: Low Risk  (12/26/2024)    Financial Resource Strain     Within the past 12 months, have you or your family members you live with been unable to get utilities (heat, electricity) when it was really needed?: No   Food Insecurity: Low Risk  (12/26/2024)    Food Insecurity     Within the past 12 months, did you worry that your food would run out before you got money to buy more?: No     Within the past 12 months, did the food you bought just not last and you didn t have money to get more?: No   Transportation Needs: Low Risk  (12/26/2024)    Transportation Needs     Within the past 12 months, has lack of transportation kept you from medical appointments, getting your medicines, non-medical meetings or appointments, work, or from getting things that you need?: No   Physical Activity: Insufficiently Active (12/26/2024)    Exercise Vital Sign     Days of Exercise per Week: 2 days     Minutes of Exercise per Session: 10 min   Stress: No Stress Concern Present (12/26/2024)    [employer] [place] [employer] - Occupational Stress Questionnaire     Feeling of Stress : Only a little   Social Connections: Unknown (12/26/2024)    Social Connection and Isolation Panel [NHANES]     Frequency of Social Gatherings with Friends and Family: Once a week   Interpersonal Safety: Low Risk  (4/10/2025)    Interpersonal Safety     Do you feel physically and emotionally safe where you currently live?: Yes     Within  the past 12 months, have you been hit, slapped, kicked or otherwise physically hurt by someone?: No     Within the past 12 months, have you been humiliated or emotionally abused in other ways by your partner or ex-partner?: No   Housing Stability: Low Risk  (12/26/2024)    Housing Stability     Do you have housing? : Yes     Are you worried about losing your housing?: No     Amelia  reports current alcohol use. and  reports that Amelia has never smoked. Amelia has never used smokeless tobacco..     REVIEW OF SYSTEMS:   A comprehensive review of systems was performed and negative unless otherwise noted in the HPI above.      PHYSICAL EXAMINATION:   /76 (BP Location: Right arm, Patient Position: Chair, Cuff Size: Adult Regular)   Pulse 92   Wt 56.7 kg (125 lb 1.6 oz)   LMP  (LMP Unknown)   SpO2 95%   BMI 27.07 kg/m    Body mass index is 27.07 kg/m .  Wt Readings from Last 2 Encounters:   07/09/25 56.7 kg (125 lb 1.6 oz)   06/24/25 55.8 kg (123 lb)     Constitutional: no acute distress, pleasant and cooperative, appears overall well.  Eyes: EOMI, PERRLA, sclera white, conjunctiva clear, without icterus or pallor   Ears/Nose/Mouth/Throat: Pinna, tragus, and external canal non-tender, tympanic membranes normal, Nares patent b/l, olfaction intact, dentition good, oropharyx clear  Cardiovascular: RRR nl S1S2, JVP not elevated, extremities with no edema or cyanosis  Respiratory: clear to auscultation and percussion bilaterally anterior and posterior  Gastrointestinal: soft, nontender, non distended, no hepatosplenomegaly or masses  Musculoskeletal: normal muscle bulk and tone, joints   Skin: normal skin appearance without worrisome lesions.   Neurologic: AOx3, CNII-XII intact, reflexes are normal in the biceps, patellar, and achilles tendons, sensation and strength intact in the b/l upper and lower extremities.  gait smooth and symmetric  Psychiatric: appropriate affect, eye contact, intact thought and  speech  Hematologic/lymphatic/immunologic: No palpable nodes in the cervical, supraclavicular, axillary or inguinal areas.        LABORATORY DATA:     LIPID RESULTS:  Recent Labs   Lab Test 09/16/24  1110 12/22/23  1209   CHOL 162 148   HDL 53 52   LDL 90 78   TRIG 95 90        LIVER ENZYME RESULTS:  Recent Labs   Lab Test 05/12/25  1109 04/02/25  1017   AST 36 36   ALT 23 20       CBC RESULTS:  Recent Labs   Lab Test 05/12/25  1109 04/02/25  1017   WBC 5.1 5.4   HGB 15.4 16.2   HCT 44.6 45.7   * 132*       BMP RESULTS:  Recent Labs   Lab Test 05/12/25  1109 05/02/25  1228 04/02/25  1017     --  136   POTASSIUM 3.7  --  3.8   CHLORIDE 97*  --  97*   CO2 29  --  25   ANIONGAP 11  --  14   GLC 95  --  103*   BUN 32.7*  --  30.6*   CR 1.14 1.5* 1.11   VIKTORIYA 9.6  --  10.1       A1C RESULTS:  Lab Results   Component Value Date    A1C 5.2 12/27/2024    A1C 6.2 (H) 05/26/2021       INR RESULTS:  Recent Labs   Lab Test 04/10/23  1327 04/10/23  1210   INR 1.71* 1.76*          PROCEDURES & FURTHER ASSESSMENTS:     EKG: reviewed    ECHO 4/30/2025:   Left ventricular systolic function is low normal.  The visual ejection fraction is 50-55%.  There is borderline global hypokinesia of the left ventricle.  Mildly decreased right ventricular systolic function  Tricuspid valve replacement with 29mm [employer] Epic bioprosthetic valve.MG  across tricupsid valve of 3mmat heart rate of 75 bpm.  There is mild to moderate (1-2+) mitral regurgitation.  There appears to be an echodense mass adjacent to the right ventricle  measuring 6 x 3.3 cm seen best in the subcostal views. This could suggest a  mass in the pericardium although cannot exclude it being in the liver.  Suggest additional imaging with either chest CTA or cardiac MRI if clinically  appropriate. This was not seen on previous echo from May 2023  Findings reviewed with KEVIN Mulligan today. The study was technically  difficult.    STRESS TEST:  none    CARDIAC CATH:     11/2022: Mild non-obstructive coronary artery disease.    CABG/ Cardiac surgery: none       CLINICAL IMPRESSION:     Amelia Michel is a 64 year old adult presents to [facility] care.    DIAGNOSES:  Patient Active Problem List    Diagnosis Date Noted    Acute on chronic systolic (congestive) heart failure (H) 02/05/2025     Priority: Medium    Central serous chorioretinopathy of left eye 02/05/2025     Priority: Medium     Noted at recent ophthalmology visit 1/2025. Records scanned in. Likely prednisone related. Will monitor, consider laser treatment in the future.      Restless leg syndrome 04/22/2024     Priority: Medium    Peripheral polyneuropathy 04/22/2024     Priority: Medium    S/P total hip arthroplasty 01/24/2024     Priority: Medium    Falls frequently 12/23/2023     Priority: Medium    Prediabetes 12/23/2023     Priority: Medium    S/P TVR (tricuspid valve replacement) 04/27/2023     Priority: Medium    Heart failure with reduced ejection fraction, NYHA class II (H) 01/24/2023     Priority: Medium    Severe tricuspid regurgitation 12/19/2022     Priority: Medium    Hyponatremia 11/19/2022     Priority: Medium    Iatrogenic cushingoid features 06/09/2022     Priority: Medium    Steroid-induced osteoporosis 06/09/2022     Priority: Medium    Stage 3b chronic kidney disease (H) 10/16/2021     Priority: Medium    Mild protein-calorie malnutrition 10/29/2019     Priority: Medium    Embolism and thrombosis of unspecified artery (H) 10/29/2019     Priority: Medium    Thrombocytopenia, unspecified 10/29/2019     Priority: Medium    Cervical cancer screening 10/18/2018     Priority: Medium     2011, 2015 NIL paps  10/18/2018:NIL pap, Neg HPV Pap screening intervals discussed with oncologist, every 3 years should be fine. Magda Stringer PA-C   10/15/21 NIL pap, Neg HPV-every 3 year cotest due to immunopsupression  12/27/24 NIL pap, neg HPV      Atrial flutter, unspecified type (H) 05/17/2018     Priority:  Medium    Paroxysmal atrial fibrillation (H) 05/11/2018     Priority: Medium    H/O cardiac arrest 09/26/2017     Priority: Medium    DLBCL (diffuse large B cell lymphoma) (H) 09/07/2017     Priority: Medium    Physical deconditioning 08/12/2017     Priority: Medium    Diffuse large B-cell lymphoma of extranodal site (H) 07/27/2017     Priority: Medium    Critical illness myopathy 06/16/2017     Priority: Medium    Cardiogenic shock (H) 05/29/2017     Priority: Medium    Atrial tachycardia 05/16/2017     Priority: Medium    Lymphedema of both lower extremities 04/04/2017     Priority: Medium    RA (rheumatoid arthritis) (H) 05/02/2016     Priority: Medium    Hyperlipidemia LDL goal <130 02/22/2011     Priority: Medium    HTN (hypertension)      Priority: Medium    Primary pulmonary hypertension (H)      Priority: Medium     Seeing [facility].        PLAN:  - Repeat TTE to compare with the prior study  - continue follow-up with EP clinic    Follow-up: 6 months    Thank you for allowing us to take part in the care of this very pleasant patient.  Please do not hesitate to call if any further questions or concerns arise.    I spent 30 min reviewing the medical record, meeting with the patient, and completing this note.    Ilhwan Yeo, MD  Interventional/Critical Care Cardiology    July 9, 2025    CC  Patient Care Team:  Gala Stringer PA-C as PCP - General (Physician Assistant)  Archana Green PA-C as Physician Assistant (Physician Assistant)  Stacie Delgado, RN as Registered Nurse (Clinical Cardiac Electrophysiology)  Kevin Sheldon MD as MD (Plastic Surgery)  Stacie Delgado, RN as Specialty Care Coordinator (Cardiology)  Eugene Hernandez RN (Cardiology)  Maribell Domínguez MD as MD (Rheumatology)  Dima Shretsha MD as MD (Endocrinology, Diabetes, and Metabolism)  Samia Calhoun, RN as Specialty Care Coordinator (Hematology & Oncology)  Maribell Domínguez MD as Assigned Rheumatology  Provider  Gala Stringer PA-C as Assigned PCP  Dima Shrestha MD as Assigned Endocrinology Provider  Cammy Soares, RN as Specialty Care Coordinator (Cardiology)  Christian Cameron MD as MD (Dermatology)  Kandy Molina, RN as Specialty Care Coordinator (Cardiology)  Virgie John AuD as Audiologist (Audiology)  Elmira Vasquez, ELIZABETH CNP as Assigned Surgical Provider  Chilango Cope MD as MD (Cardiovascular & Thoracic Surgery)  Luci Giles APRN CNP as Nurse Practitioner (Anesthesiology)  Jackie Castro APRN CNP as Nurse Practitioner (Pain Medicine)  Archana Brar [facility] as Pharmacist (Pharmacist)  Archana Brar RP as Assigned MTM Pharmacist  Maria Esther Dao, ELIZABETH CNP as Assigned Heart and Vascular Provider  Chacha Pantoja MD as MD (Cardiovascular Disease)  Zuleima Lin APRN CNP as Nurse Practitioner (Hematology & Oncology)  Zuleima Lin APRN CNP as Assigned Cancer Care Provider  YEO, ILHWAN

## 2025-07-09 NOTE — NURSING NOTE
Chief Complaint   Patient presents with    New Patient     7/9/25 - reason for visit: Establish w/ general cardiology       Vitals were taken, medications reconciled.    Ayden Ledesma, EMT    12:52 PM

## 2025-07-09 NOTE — LETTER
7/9/2025      RE: Amelia Michel  [address]  [facility] [id number]       Dear Colleague,    Thank you for the opportunity to participate in the care of your patient, Amelia Michel, at the [facility] HEART [facility]IN[facility] [place] at [facility]. Please see a copy of my visit note below.    [facility]  CARDIOVASCULAR MEDICINE CLINIC NOTE    Referring Provider: Ilhwan Yeo   Primary Care Provider: Gala Stringer     Patient Name: Amelia Michel   MRN: [id number]     PERTINENT CLINICAL HISTORY:   Amelia Michel is a 64 year old adult with a history significant for VSD s/p repair 1969, RA, HTN, insomnia, atrial tachyarrhythmias, cardiac arrest May 2017, SSS s/p PPM 12/2019, DLBCL, VT s/p VT ablation 12/1/2017 and exudative pericardial effusion, and severe TR s/p TVT 4/10/23 presents to establish the general cardiology care.   Recently, had TTE at OSH which showed mass-like findings in pericardium and subsequently underwent extensive workup including PET scan, breast biopsy which are all negative for significant pathology.    Today, patient denies any acute complaints.  /76 mmHg, HR 92 bpm, SpO2 95%.    Last visit with EP clinic was in 1/2025.    SH: lives with , no smoking, ETOH (3-4 times per week, wine), illicit drugs, not very active due to neuropathy and arthritis    Labs: A1c 5.2 (12/2024), LDL 90 (9/2024).     Meds:  Eliquis, ASA 81mg po daily,  Cardizem 240mg po daily, Jardiance 10mg po daily,   Spironolactone 12.5mg po daily, Torsemide 20mg po daily, Prednisone 3mg po daily.     PAST MEDICAL HISTORY:     Past Medical History:   Diagnosis Date     Acute on chronic diastolic heart failure (H) 11/19/2022     Antiplatelet or antithrombotic long-term use      Arrhythmia      Arthritis      Atrial fibrillation (H)      Atrial flutter (H)      Bradycardia 12/12/2019    Added automatically from request for surgery [id number]       Cancer  (H) June 2017    DLBCL currently in remission     Cardiac arrest (H) 2017     Chronic kidney disease      Congestive heart failure (H)      Diffuse large B-cell lymphoma of extranodal site (H) 2017     Edema, unspecified type 01/24/2023     Extremity restricted from procedure     Left arm     Fall 12/06/2023    Cut on head     H/O blood transfusion reaction     Hives with platelet transfusion, needs pre-medication with tylenol and benadryl     Heart murmur      History of blood clots 2017    PICC line Right arm      History of chemotherapy      History of transfusion      Hypertension      Hypervolemia, unspecified hypervolemia type 01/24/2023     Lymphedema of both lower extremities     wears lymphedema socks     Neuropathy     Feet and hands     NSVT (nonsustained ventricular tachycardia) (H) 12/2022     Pacemaker 2019     Pericardial effusion 2017     Physical deconditioning      PONV (postoperative nausea and vomiting)      Positive PPD, 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     Prediabetes      Pulmonary hypertension (H)      RA (rheumatoid arthritis) (H)      SSS (sick sinus syndrome) (H) 2019    Pacemaker     Steroid-induced osteoporosis      Thrombocytopenia      VSD (ventricular septal defect)         PAST SURGICAL HISTORY:     Past Surgical History:   Procedure Laterality Date     ANESTHESIA CARDIOVERSION N/A 05/17/2017    Procedure: ANESTHESIA CARDIOVERSION;  ANESTHESIA CARDIOVERSION;  Surgeon: GENERIC ANESTHESIA PROVIDER;  Location: [facility] OR     ANESTHESIA CARDIOVERSION  03/12/2018    Procedure: ANESTHESIA CARDIOVERSION;;  Surgeon: GENERIC ANESTHESIA PROVIDER;  Location: [facility] OR     ANESTHESIA CARDIOVERSION N/A 03/23/2018    Procedure: ANESTHESIA CARDIOVERSION;  Anesthesia Cardioversion ;  Surgeon: GENERIC ANESTHESIA PROVIDER;  Location: [facility] OR     ANESTHESIA CARDIOVERSION N/A 04/12/2018    Procedure: ANESTHESIA CARDIOVERSION;  Cardioversion;  Surgeon: GENERIC ANESTHESIA PROVIDER;  Location: [facility] OR     ARTHRODESIS WRIST  2000    Right  wrist     BONE MARROW ASPIRATE &BIOPSY  07/12/2017          BONE MARROW BIOPSY W/ ASPIRATION  07/12/2017     CARDIOVERSION      5/17/17, 3/12/18, 3/23/18, 4/14/18,      COLONOSCOPY N/A 11/19/2019    Procedure: Colonoscopy, With Polypectomy And Biopsy;  Surgeon: Pj Vasquse MD;  [facility]: [facility]     CV CORONARY ANGIOGRAM N/A 11/28/2022    Procedure: Coronary Angiogram;  Surgeon: Sundar Wood MD;  Location:  HEART CARDIAC CATH LAB     CV RIGHT HEART CATH MEASUREMENTS RECORDED N/A 11/28/2022    Procedure: Right Heart Catheterization;  Surgeon: Sundar Wood MD;  Location: [facility] CARDIAC CATH LAB     EP ABLATION PVC N/A 12/01/2022    Procedure: Ablation Premature Ventricular Contractions;  Surgeon: Juan Eaton MD;  Location: [facility] CARDIAC CATH LAB     EP PACEMAKER N/A 12/13/2019    Procedure: EP Pacemaker;  Surgeon: Pj Trent MD;  Location: [facility] CARDIAC CATH LAB     EXCISE LESION UPPER EXTREMITY Left 05/22/2018    Procedure: EXCISE LESION UPPER EXTREMITY;  Left Arm Wound Excision And Closure, Possible Submuscular Transposition of Ulnar Nerve  (Choice Anesthesia);  Surgeon: Kevin Sheldon MD;  Location:  OR     FOOT SURGERY      4 left and 2 right     FOOT SURGERY      4 Left and 2 Right      IMPLANT PACEMAKER  12/13/2019    Dr China Trent  [facility] Cardiac Cath lab      IMPLANT PACEMAKER  12/2019     DE CARDIAC SURG PROCEDURE UNLISTED  1969    vsd repair     DE HAND/FINGER SURGERY UNLISTED  2005?    right partial wrist fusion     RELEASE CARPAL TUNNEL Bilateral      REPAIR ATRIAL SEPTAL DEFECT  1969    VSD repair     REPAIR VALVE TRICUSPID MINIMALLY INVASIVE N/A 04/10/2023    Procedure: MINIMALLY INVASIVE TRICUSPID VALVE REPLACEMENT USING 29MM ST. [place]IL[employer] EPIC VALVE, FEMORAL CANNULATION AND RIGHT GROIN CUTDOWN, CARDIOPULMONARY BYPASS, TRANSESOPHAGEAL ECHOCARDIOGRAM PERFORMED BY ANESTHESIA STAFF;  Surgeon: Chilango Cope MD;  Location:  OR     REPAIR VENTRICULAR  SEPTAL DEFECT  1969     TOTAL HIP ARTHROPLASTY Left 01/24/2024    Procedure: LEFT TOTAL HIP ARTHROPLASTY DIRECT ANTERIOR APPROACH ORTHOGRID;  Surgeon: Romeo Quintana MD;  Location: [facility] OR     TRANSPOSITION ULNAR NERVE (ELBOW) Left 05/22/2018    Procedure: TRANSPOSITION ULNAR NERVE (ELBOW);;  Surgeon: Kevin Sheldon MD;  Location: [facility] OR     VSD REPAIR  1969     ZZC FUSION FOOT BONES,SUBTALAR Right 10/20/2020    Procedure: RIGHT SUBTALAR JOINT FUSION AND CALCANEOCUBOID FUSION;  Surgeon: Nemesio Crow MD;  Location: [facility] Main OR;  Service: Orthopedics        CURRENT MEDICATIONS:     Current Outpatient Medications   Medication Sig Dispense Refill     Abatacept (ORENCIA) 125 MG/ML SOSY pre-filled syringe Inject 1 mL (125 mg) subcutaneously once a week. 4 mL 11     acetaminophen (TYLENOL) 325 MG tablet Take 3 tablets (975 mg) by mouth every 8 hours       aspirin (ASA) 81 MG chewable tablet 1 tablet (81 mg) by Oral or NG Tube route daily 30 tablet 2     calcium carbonate 600 mg-vitamin D 400 units (CALTRATE) 600-400 MG-UNIT per tablet Take 2 tablets by mouth daily       dexAMETHasone (DECADRON) 4 MG/ML injection Inject 4 mg for adrenal crisis 2 mL 1     diltiazem ER (DILT-XR) 240 MG 24 hr ER beaded capsule Take 1 capsule (240 mg) by mouth daily. 90 capsule 3     ELIQUIS ANTICOAGULANT 5 MG tablet TAKE 1 TABLET(5 MG) BY MOUTH TWICE DAILY 180 tablet 3     empagliflozin (JARDIANCE) 10 MG TABS tablet Take 1 tablet (10 mg) by mouth daily. 90 tablet 3     gabapentin (NEURONTIN) 100 MG capsule Take 1 capsule (100 mg) by mouth 2 times daily AND 2 capsules (200 mg) at bedtime. 360 capsule 3     hydroxychloroquine (PLAQUENIL) 200 MG tablet Take 1 tablet (200 mg) by mouth daily. 90 tablet 1     loratadine (CLARITIN) 10 MG tablet Take 10 mg by mouth daily as needed.       magnesium oxide 200 MG TABS Take 200 mg by mouth daily bedtime       potassium chloride timmy ER (KLOR-CON M20) 20 MEQ CR tablet Take 3  tablets (60 mEq) by mouth daily (Patient taking differently: Take 20 mEq by mouth 3 times daily.) 270 tablet 3     predniSONE (DELTASONE) 1 MG tablet Take 3 tablets (3 mg) by mouth daily - Oral 270 tablet 3     spironolactone (ALDACTONE) 25 MG tablet Take 0.5 tablets (12.5 mg) by mouth daily. 45 tablet 3     torsemide (DEMADEX) 20 MG tablet Take 1 tablet (20 mg) by mouth daily. 90 tablet 3     Vitamin D (Cholecalciferol) 10 MCG (400 UNIT) TABS Take 1 tablet by mouth daily       zoledronic acid (RECLAST) 5 MG/100ML infusion 5 mg every year.       docusate sodium (COLACE) 100 MG capsule Take 100 mg by mouth 2 times daily as needed for constipation (Patient not taking: Reported on 7/9/2025)       sulfamethoxazole-trimethoprim (BACTRIM DS) 800-160 MG tablet Take 1 tablet by mouth 2 times daily (Patient not taking: Reported on 7/9/2025) 4 tablet 5        ALLERGIES:     Allergies   Allergen Reactions     Blood Transfusion Related (Informational Only) Hives     Hives with platelets. Give benadryl premedication.     Amiodarone Other (See Comments) and Difficulty breathing     Lethargic and had trouble breathing- occurred in 2017     Metoprolol Other (See Comments)     Pt and  report that metoprolol does not work for her and also reports feeling unwell with this medication. She has been able to tolerate atenolol, which as worked in controlling her HR.      Penicillins Other (See Comments)     Childhood reaction, concern for tongue swelling     Tape [Adhesive Tape] Rash        FAMILY HISTORY:     Family History   Problem Relation Age of Onset     Breast Cancer Mother         60s     Hypertension Mother      Alzheimer Disease Mother      Cerebrovascular Disease Mother      Hypertension Father      Cerebrovascular Disease Father      Atrial fibrillation Father      Lymphoma Father      Prostate Cancer Father      Other Cancer Father         some form of Lymphoma     Alzheimer Disease Maternal Grandmother         likely      Arthritis Maternal Grandfather      Pneumonia Maternal Grandfather      Diabetes Paternal Grandmother      Mental Illness Sister         early onset  alzheimers        SOCIAL HISTORY:     Social History     Socioeconomic History     Marital status:      Spouse name: Romeo Michel     Number of children: 0     Years of education: None     Highest education level: None   Occupational History     Employer: [employer]   Tobacco Use     Smoking status: Never     Smokeless tobacco: Never   Vaping Use     Vaping status: Never Used   Substance and Sexual Activity     Alcohol use: Yes     Comment: 2 drinks per week     Drug use: Never     Sexual activity: Not Currently     Partners: Male     Birth control/protection: Post-menopausal   Other Topics Concern     Parent/sibling w/ CABG, MI or angioplasty before 65F 55M? No   Social History Narrative    Retired RN.     Social Drivers of Health     Financial Resource Strain: Low Risk  (12/26/2024)    Financial Resource Strain      Within the past 12 months, have you or your family members you live with been unable to get utilities (heat, electricity) when it was really needed?: No   Food Insecurity: Low Risk  (12/26/2024)    Food Insecurity      Within the past 12 months, did you worry that your food would run out before you got money to buy more?: No      Within the past 12 months, did the food you bought just not last and you didn t have money to get more?: No   Transportation Needs: Low Risk  (12/26/2024)    Transportation Needs      Within the past 12 months, has lack of transportation kept you from medical appointments, getting your medicines, non-medical meetings or appointments, work, or from getting things that you need?: No   Physical Activity: Insufficiently Active (12/26/2024)    Exercise Vital Sign      Days of Exercise per Week: 2 days      Minutes of Exercise per Session: 10 min   Stress: No Stress Concern Present (12/26/2024)     [facility] of Occupational Health - Occupational Stress Questionnaire      Feeling of Stress : Only a little   Social Connections: Unknown (12/26/2024)    Social Connection and Isolation Panel [NHANES]      Frequency of Social Gatherings with Friends and Family: Once a week   Interpersonal Safety: Low Risk  (4/10/2025)    Interpersonal Safety      Do you feel physically and emotionally safe where you currently live?: Yes      Within the past 12 months, have you been hit, slapped, kicked or otherwise physically hurt by someone?: No      Within the past 12 months, have you been humiliated or emotionally abused in other ways by your partner or ex-partner?: No   Housing Stability: Low Risk  (12/26/2024)    Housing Stability      Do you have housing? : Yes      Are you worried about losing your housing?: No     Amelia  reports current alcohol use. and  reports that Amelia has never smoked. Amelia has never used smokeless tobacco..     REVIEW OF SYSTEMS:   A comprehensive review of systems was performed and negative unless otherwise noted in the HPI above.      PHYSICAL EXAMINATION:   /76 (BP Location: Right arm, Patient Position: Chair, Cuff Size: Adult Regular)   Pulse 92   Wt 56.7 kg (125 lb 1.6 oz)   LMP  (LMP Unknown)   SpO2 95%   BMI 27.07 kg/m    Body mass index is 27.07 kg/m .  Wt Readings from Last 2 Encounters:   07/09/25 56.7 kg (125 lb 1.6 oz)   06/24/25 55.8 kg (123 lb)     Constitutional: no acute distress, pleasant and cooperative, appears overall well.  Eyes: EOMI, PERRLA, sclera white, conjunctiva clear, without icterus or pallor   Ears/Nose/Mouth/Throat: Pinna, tragus, and external canal non-tender, tympanic membranes normal, Nares patent b/l, olfaction intact, dentition good, oropharyx clear  Cardiovascular: RRR nl S1S2, JVP not elevated, extremities with no edema or cyanosis  Respiratory: clear to auscultation and percussion bilaterally anterior and posterior  Gastrointestinal:  soft, nontender, non distended, no hepatosplenomegaly or masses  Musculoskeletal: normal muscle bulk and tone, joints   Skin: normal skin appearance without worrisome lesions.   Neurologic: AOx3, CNII-XII intact, reflexes are normal in the biceps, patellar, and achilles tendons, sensation and strength intact in the b/l upper and lower extremities.  gait smooth and symmetric  Psychiatric: appropriate affect, eye contact, intact thought and speech  Hematologic/lymphatic/immunologic: No palpable nodes in the cervical, supraclavicular, axillary or inguinal areas.        LABORATORY DATA:     LIPID RESULTS:  Recent Labs   Lab Test 09/16/24  1110 12/22/23  1209   CHOL 162 148   HDL 53 52   LDL 90 78   TRIG 95 90        LIVER ENZYME RESULTS:  Recent Labs   Lab Test 05/12/25  1109 04/02/25  1017   AST 36 36   ALT 23 20       CBC RESULTS:  Recent Labs   Lab Test 05/12/25  1109 04/02/25  1017   WBC 5.1 5.4   HGB 15.4 16.2   HCT 44.6 45.7   * 132*       BMP RESULTS:  Recent Labs   Lab Test 05/12/25  1109 05/02/25  1228 04/02/25  1017     --  136   POTASSIUM 3.7  --  3.8   CHLORIDE 97*  --  97*   CO2 29  --  25   ANIONGAP 11  --  14   GLC 95  --  103*   BUN 32.7*  --  30.6*   CR 1.14 1.5* 1.11   VIKTORIYA 9.6  --  10.1       A1C RESULTS:  Lab Results   Component Value Date    A1C 5.2 12/27/2024    A1C 6.2 (H) 05/26/2021       INR RESULTS:  Recent Labs   Lab Test 04/10/23  1327 04/10/23  1210   INR 1.71* 1.76*          PROCEDURES & FURTHER ASSESSMENTS:     EKG: reviewed    ECHO 4/30/2025:   Left ventricular systolic function is low normal.  The visual ejection fraction is 50-55%.  There is borderline global hypokinesia of the left ventricle.  Mildly decreased right ventricular systolic function  Tricuspid valve replacement with 29mm [employer] Epic bioprosthetic valve.MG  across tricupsid valve of 3mmat heart rate of 75 bpm.  There is mild to moderate (1-2+) mitral regurgitation.  There appears to be an echodense mass  adjacent to the right ventricle  measuring 6 x 3.3 cm seen best in the subcostal views. This could suggest a  mass in the pericardium although cannot exclude it being in the liver.  Suggest additional imaging with either chest CTA or cardiac MRI if clinically  appropriate. This was not seen on previous echo from May 2023  Findings reviewed with KEVIN Mulligan today. The study was technically  difficult.    STRESS TEST:  none    CARDIAC CATH:    11/2022: Mild non-obstructive coronary artery disease.    CABG/ Cardiac surgery: none       CLINICAL IMPRESSION:     Amelia Michel is a 64 year old adult presents to [facility] care.    DIAGNOSES:  Patient Active Problem List    Diagnosis Date Noted     Acute on chronic systolic (congestive) heart failure (H) 02/05/2025     Priority: Medium     Central serous chorioretinopathy of left eye 02/05/2025     Priority: Medium     Noted at recent ophthalmology visit 1/2025. Records scanned in. Likely prednisone related. Will monitor, consider laser treatment in the future.       Restless leg syndrome 04/22/2024     Priority: Medium     Peripheral polyneuropathy 04/22/2024     Priority: Medium     S/P total hip arthroplasty 01/24/2024     Priority: Medium     Falls frequently 12/23/2023     Priority: Medium     Prediabetes 12/23/2023     Priority: Medium     S/P TVR (tricuspid valve replacement) 04/27/2023     Priority: Medium     Heart failure with reduced ejection fraction, NYHA class II (H) 01/24/2023     Priority: Medium     Severe tricuspid regurgitation 12/19/2022     Priority: Medium     Hyponatremia 11/19/2022     Priority: Medium     Iatrogenic cushingoid features 06/09/2022     Priority: Medium     Steroid-induced osteoporosis 06/09/2022     Priority: Medium     Stage 3b chronic kidney disease (H) 10/16/2021     Priority: Medium     Mild protein-calorie malnutrition 10/29/2019     Priority: Medium     Embolism and thrombosis of unspecified artery (H) 10/29/2019      Priority: Medium     Thrombocytopenia, unspecified 10/29/2019     Priority: Medium     Cervical cancer screening 10/18/2018     Priority: Medium     2011, 2015 NIL paps  10/18/2018:NIL pap, Neg HPV Pap screening intervals discussed with oncologist, every 3 years should be fine. Magda Stringer PA-C   10/15/21 NIL pap, Neg HPV-every 3 year cotest due to immunopsupression  12/27/24 NIL pap, neg HPV       Atrial flutter, unspecified type (H) 05/17/2018     Priority: Medium     Paroxysmal atrial fibrillation (H) 05/11/2018     Priority: Medium     H/O cardiac arrest 09/26/2017     Priority: Medium     DLBCL (diffuse large B cell lymphoma) (H) 09/07/2017     Priority: Medium     Physical deconditioning 08/12/2017     Priority: Medium     Diffuse large B-cell lymphoma of extranodal site (H) 07/27/2017     Priority: Medium     Critical illness myopathy 06/16/2017     Priority: Medium     Cardiogenic shock (H) 05/29/2017     Priority: Medium     Atrial tachycardia 05/16/2017     Priority: Medium     Lymphedema of both lower extremities 04/04/2017     Priority: Medium     RA (rheumatoid arthritis) (H) 05/02/2016     Priority: Medium     Hyperlipidemia LDL goal <130 02/22/2011     Priority: Medium     HTN (hypertension)      Priority: Medium     Primary pulmonary hypertension (H)      Priority: Medium     Seeing [facility].        PLAN:  - Repeat TTE to compare with the prior study  - continue follow-up with EP clinic    Follow-up: 6 months    Thank you for allowing us to take part in the care of this very pleasant patient.  Please do not hesitate to call if any further questions or concerns arise.    I spent 30 min reviewing the medical record, meeting with the patient, and completing this note.    Ilhwan Yeo, MD  Interventional/Critical Care Cardiology    July 9, 2025    CC  Patient Care Team:  Gala Stringer PA-C as PCP - General (Physician Assistant)  Archana Green PA-C as Physician Assistant (Physician  Assistant)  Stacie Delgado, RN as Registered Nurse (Clinical Cardiac Electrophysiology)  Kevin Sheldon MD as MD (Plastic Surgery)  Stacie Delgado, ABBEY as Specialty Care Coordinator (Cardiology)  Eugene Hernandez, ABBEY (Cardiology)  Maribell Domínguez MD as MD (Rheumatology)  Dima Shrestha MD as MD (Endocrinology, Diabetes, and Metabolism)  Samia Calhoun, RN as Specialty Care Coordinator (Hematology & Oncology)  Maribell Domínguez MD as Assigned Rheumatology Provider  Gala Stringer PA-C as Assigned PCP  Dima Shrestha MD as Assigned Endocrinology Provider  Cammy Soares RN as Specialty Care Coordinator (Cardiology)  Christian Cameron MD as MD (Dermatology)  Kandy Molina, RN as Specialty Care Coordinator (Cardiology)  Virgie John AuD as Audiologist (Audiology)  Elmira Vasquez APRN CNP as Assigned Surgical Provider  Chilango Cope MD as MD (Cardiovascular & Thoracic Surgery)  Luci Giles APRN CNP as Nurse Practitioner (Anesthesiology)  Jackie Castro APRN CNP as Nurse Practitioner (Pain Medicine)  Archana Brar formerly [facility] as Pharmacist (Pharmacist)  Archana Brar RP as Assigned MTM Pharmacist  Maria Esther Dao APRN CNP as Assigned Heart and Vascular Provider  Chacha Pantoja MD as MD (Cardiovascular Disease)  Zuleima Lin APRN CNP as Nurse Practitioner (Hematology & Oncology)  Zuleima Lin APRN CNP as Assigned Cancer Care Provider  YEO, ILHWAN     Please do not hesitate to contact me if you have any questions/concerns.     Sincerely,     Ilhwan Yeo, MD

## 2025-07-14 NOTE — H&P
[facility]   Cardiology I Service  History & Physical    Amelia Michel  : 1960  MRN # [id number]    ADMIT DATE: 2023    PCP: Gala Stringer MD    Primary cardiologist: Dr. 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    She was recently admitted -22 with ADHF and frequent NSVT s/p PVC ablation on 22. She was seen in [facility] on 22 and 2023 when she was doing fine, and waiting for insurance approval for CardioMEMS (denied, currently in the process of appeal). However, over the past 2 weeks she has experienced worsening shortness of breath on exertion, BLE swelling and gradual weight gain from 110 to 119 lbs. She could only walk for about 100 feet. Denies fever, chest pain, cough, PND, orthopnea, palpitations, syncope or near-syncope, hemoptysis, nausea, vomiting, abdominal pain, melena, hematochezia, constipation, diarrhea, dysuria, hematuria, headaches, local weakness, numbness/tingling of hands/feet.    In the ED, hemodynamically stable, received lasix 40 mg IV once.      ROS:   12-pt ROS otherwise negative except as noted above    PMH:  Past Medical History:   Diagnosis Date    Arthritis     Cardiac arrest (H)     History of blood clots     PICC line Right arm     History of chemotherapy     History of transfusion     Hypertension     Neuropathy     Physical deconditioning     Positive PPD, treated     VSD      Your Child's Health  Five to Six-Year-Old Visit      Gillian Galo  July 14, 2025    Visit Vitals  BP (!) 84/60 (BP Location: RUE - Right upper extremity, Patient Position: Sitting, Cuff Size: Pediatric)   Ht 3' 4.75\" (1.035 m)   Wt 18.7 kg (41 lb 3.2 oz)   BMI 17.44 kg/m²     Weight:       YOUR CHILD'S 5 to 6 YEAR-OLD VISIT    School  Starting school is a major milestone for children and their family members. Good language and willingness or readiness to separate from parents easily are a couple of the most important skills indicating school readiness. Once a child is enrolled in school, parents need to keep in close contact with the teachers. Learning or developmental problems which had not been previously apparent may become evident in  or first grade. If your child had previously received some educational services or therapies in a  program, they may qualify for continued services in school. School systems are obligated to evaluate children if there are significant concerns about learning or developmental problems. If you need help accessing this type of evaluation, talk with your schools, your doctor, or you can call the Department of Public Instruction at [phone] or visit their  website at http://dpi.wi.gov.    Help ensure your child's success at school by making sure they are well rested (a regular bedtime routine is important in this regard). Also, make sure that they have eaten (or have an opportunity to eat at school) every morning. Children who are tired or hungry are not in the best state to learn well! Make sure they are not over scheduled with afterschool activities (sports, clubs, other social activities)-- children need some unstructured downtime every day. As your child learns to read, set aside time daily to read together--it helps them learn and is a great way to spend family time together.     Social Development  Five and six-year-old children will be spending more time  (ventricular septal defect)        PSH:  Past Surgical History:   Procedure Laterality Date    ANESTHESIA CARDIOVERSION N/A 5/17/2017    Procedure: ANESTHESIA CARDIOVERSION;  ANESTHESIA CARDIOVERSION;  Surgeon: GENERIC ANESTHESIA PROVIDER;  Location: [facility]    ANESTHESIA CARDIOVERSION  3/12/2018    Procedure: ANESTHESIA CARDIOVERSION;;  Surgeon: GENERIC ANESTHESIA PROVIDER;  [facility]: [facility]    ANESTHESIA CARDIOVERSION N/A 3/23/2018    Procedure: ANESTHESIA CARDIOVERSION;  Anesthesia Cardioversion ;  Surgeon: GENERIC ANESTHESIA PROVIDER;  [facility]: [facility]    ANESTHESIA CARDIOVERSION N/A 4/12/2018    Procedure: ANESTHESIA CARDIOVERSION;  Cardioversion;  Surgeon: GENERIC ANESTHESIA PROVIDER;  [facility]    ARTHRODESIS WRIST  2000    Right wrist    BONE MARROW ASPIRATE &BIOPSY  7/12/2017         BONE MARROW BIOPSY W/ ASPIRATION  07/12/2017    CARDIOVERSION      5/17/17, 3/12/18, 3/23/18, 4/14/18,     COLONOSCOPY N/A 11/19/2019    Procedure: Colonoscopy, With Polypectomy And Biopsy;  Surgeon: Pj Vasques MD;  [facility]: [facility]    CV CORONARY ANGIOGRAM N/A 11/28/2022    Procedure: Coronary Angiogram;  Surgeon: Sundar Wood MD;  Location:  HEART CARDIAC CATH LAB    CV RIGHT HEART CATH MEASUREMENTS RECORDED N/A 11/28/2022    Procedure: Right Heart Catheterization;  Surgeon: Sundar Wood MD;  Location:  HEART CARDIAC CATH LAB    EP ABLATION PVC N/A 12/1/2022    Procedure: Ablation Premature Ventricular Contractions;  Surgeon: Juan Eaton MD;  Location:  HEART CARDIAC CATH LAB    EP PACEMAKER N/A 12/13/2019    Procedure: EP Pacemaker;  Surgeon: Pj Trent MD;  Location:  HEART CARDIAC CATH LAB    EXCISE LESION UPPER EXTREMITY Left 5/22/2018    Procedure: EXCISE LESION UPPER EXTREMITY;  Left Arm Wound Excision And Closure, Possible Submuscular Transposition of Ulnar Nerve  (Choice Anesthesia);  Surgeon: Kevin Sheldon MD;  Location:  OR    FOOT SURGERY      4 left and 2 right     with friends and peers and away from their families. It is important to know the friends and families that your child is spending time with.     Peers have a lot of influence on each other, and your child may be interacting with friends who do not have to follow the same rules that you have set for your child. Some rules may change as they get older, but being very clear and very consistent continues to be critical component of effective parenting. Children will frequently push the limits at this age to see what they can get away with--so it is important to regularly remind your child of appropriate rules and expectations that you have for them.     Help your child feel good about themselves by giving them praise for their accomplishments, doing things together, and listening to them without interrupting. Give them opportunities to gradually be more independent and responsible.    Continue to set a good example for them - be responsible in your own obligations, mean what you say, model appropriate behavior when you yourself are angry or upset, and show respect for others by being on time. When your child is angry or frustrated, talk to them about why they are feeling that way, what their options are and how to resolve conflicts appropriately. Physical aggression - hitting, biting, kicking, throwing things- should not be tolerated at this age; teach your children healthier ways to respond to upsetting situations and blow off steam.    Households with working parents and children school are busy! Try to devote mealtimes, bedtimes, and even driving time, to talk with your children-- keep phones and other electronic media turned off and focus on talking to each other.     Remind your children that they can tell you anything. It is especially important that they know to report you if they feel they are being teased or bullied. They should also be taught to report bullying that they witness to you or to a teacher. Any  FOOT SURGERY      4 Left and 2 Right     IMPLANT PACEMAKER  12/13/2019    Dr China Trent  [facility]     IMPLANT PACEMAKER      RELEASE CARPAL TUNNEL Bilateral     RELEASE CARPAL TUNNEL BILATERAL      REPAIR VENTRICULAR SEPTAL DEFECT  1969    TRANSPOSITION ULNAR NERVE (ELBOW) Left 5/22/2018    Procedure: TRANSPOSITION ULNAR NERVE (ELBOW);;  Surgeon: Kevin Sheldon MD;  Location: [facility] OR    ULNAR NERVE TRANSPOSITION Left 05/22/2018    VSD REPAIR  1969    ZZC FUSION FOOT BONES,SUBTALAR Right 10/20/2020    Procedure: RIGHT SUBTALAR JOINT FUSION AND CALCANEOCUBOID FUSION;  Surgeon: Nemesio Crow MD;  Location: [facility] OR;  Service: Orthopedics       MEDICATIONS:  Prior to Admission Medications   Prescriptions Last Dose Informant Patient Reported? Taking?   STATIN NOT PRESCRIBED (INTENTIONAL)  Self No No   Sig: Please choose reason not prescribed, below   Patient not taking: Reported on 12/19/2022   acetaminophen (TYLENOL) 500 MG tablet  Self Yes No   Sig: Take 500 mg by mouth every 6 hours as needed for mild pain   alendronate (FOSAMAX) 70 MG tablet  Self No No   Sig: TAKE 1 TABLET(70 MG) BY MOUTH EVERY 7 DAYS   apixaban ANTICOAGULANT (ELIQUIS ANTICOAGULANT) 5 MG tablet  Self No No   Sig: Take 1 tablet (5 mg) by mouth 2 times daily   atenolol (TENORMIN) 50 MG tablet   No No   Sig: Take 1 tablet (50 mg) by mouth 2 times daily   calcium carbonate 600 mg-vitamin D 400 units (CALTRATE) 600-400 MG-UNIT per tablet  Self Yes No   Sig: Take 2 tablets by mouth daily   empagliflozin (JARDIANCE) 10 MG TABS tablet   No No   Sig: Take 1 tablet (10 mg) by mouth daily for 360 days   furosemide (LASIX) 40 MG tablet   No No   Sig: Take 1 tablet (40 mg) by mouth 2 times daily for 240 days   gabapentin (NEURONTIN) 100 MG capsule   No No   Sig: Take 1 capsule AM + 1 capsule afternoon + 2 capsules at bedtime   hydroxychloroquine (PLAQUENIL) 200 MG tablet   No No   Sig: TAKE 1 TABLET(200 MG) BY MOUTH DAILY  concerns about bullying should be addressed-talk to your teachers, administrators or guidance counselors at the school to help with this issue. Good online resources regarding bullying are Shopcliq.gov and the American Academy of Pediatrics \"HealthyChildren.org\" website (search for \"Bullying\").     Dental  Your child should be brushing at least twice daily for 2 minutes at a time with a pea-sized amount of regular (fluoridated) toothpaste. Children this age can also be taught to floss their teeth, but they still need a parent’s help to make sure all their back teeth are brushed well. Look for a dentist if you do not already have one. Limiting candy, other sweets, juice and sticky/chewy foods is good for their dental health.     Nutrition  Your child will be making more of their own choices about what to eat, so keep plenty of nutritious foods in your home for meal and snack times. Make sure your child eats something healthy for breakfast every morning; this is proven to positively impact school performance and learning. Your child needs ~3 servings of good sources of calcium everyday; this can include lowfat (or skim) milk, yogurt, low fat cheese or foods which have been fortified with calcium. Children this age should get 600 IU of vitamin D daily which (along with appropriate calcium intake) ensures good bone health. Most people cannot meet their vitamin D needs with their usual diet, so a multivitamin with iron supplement continues to be appropriate for this age. (A pure vitamin D supplement with 400 or 600 IU is also okay.)    Overweight and obesity are very serious problems; teaching your growing child to make healthy choices is important, as is continuing to avoid unhealthy choices like greasy fast food, bagged snacks, sodas and sweet drinks, lots of juice, candies and sweets. Make unhealthy choices an occasional treat only; don’t keep them in your home, because your child will find them!    Physical    loratadine (CLARITIN) 10 MG tablet  Self Yes No   Sig: Take 10 mg by mouth daily   magnesium oxide 200 MG TABS   Yes No   Sig: Take 200 mg by mouth daily bedtime   multivitamin, therapeutic with minerals (THERA-VIT-M) TABS tablet  Self No No   Sig: Take 1 tablet by mouth daily   predniSONE (DELTASONE) 1 MG tablet  Self No No   Sig: Take 3 tablets (3 mg) by mouth daily   spironolactone (ALDACTONE) 25 MG tablet   No No   Sig: Take 1 tablet (25 mg) by mouth daily      Facility-Administered Medications: None        ALLERGIES:     Allergies   Allergen Reactions    Amiodarone Other (See Comments) and Difficulty breathing     Lethargic and had trouble breathing- occurred in 2017    Blood Transfusion Related (Informational Only) Hives     Hives with platelets. Give benadryl premedication.    Metoprolol Other (See Comments)     Pt and  report that metoprolol does not work for her and also reports feeling unwell with this medication. She has been able to tolerate atenolol, which as worked in controlling her HR.     Other [No Clinical Screening - See Comments]      Penicillin Allergy Skin Test not performed, see antimicrobial management team progress note 7/5/17.      Penicillins     Tape [Adhesive Tape] Rash       FAMILY HISTORY:  Family History   Problem Relation Age of Onset    Breast Cancer Mother         60s    Hypertension Mother     Alzheimer Disease Mother     Cerebrovascular Disease Mother     Hypertension Father     Cerebrovascular Disease Father     Atrial fibrillation Father     Lymphoma Father     Prostate Cancer Father     Alzheimer Disease Maternal Grandmother         likely    Arthritis Maternal Grandfather     Pneumonia Maternal Grandfather     Diabetes Paternal Grandmother        SOCIAL HISTORY:  Social History     Socioeconomic History    Marital status:      Spouse name: Romeo Michel    Number of children: 0    Years of education: Not on file    Highest education level: Not on file  Activity and Screen Time   A child who enjoys being physically active when they are young will be more likely to stay active as they grow older. Set a goal of 60 minutes of physical activity every day--it can be all at once or broken up into shorter segments. Try to make it a family activity as much as possible.     Time watching television, playing on the computer or using any other form of electronic media is NOT physical activity. As your child learns to read and their fine motor skills improve, they are going to become very skilled at using the computer and Internet (and they are going to like it!). Setting limits and supervising media use is very important. Set a time limit for media use each day (and enforce it). Consider setting up child-specific browsers and creating a “Favorites” toolbar so your child can only get to approved websites. For suggestions on developing healthy media habits, do an internet search for “healthychildren media use plan” for recommendations from the American Academy of Pediatrics.  Also, don't allow snacking in front of the TV set-- that is another unhealthy habit that is easier to prevent than change!    And parents need to be a role model--if you are constantly on your phone in situations where you could be talking with or interacting with your child, you are teaching them that the phone takes priority over your interaction with them.        Safety  Car:  Until a child weighs at least 40 pounds, they are safest in a rear or forward-facing car seat with a 5-point harness. If your 5-point harness seat has a higher weight limit than 40 pounds, keep your child in it-- they are safest in these seats until they outgrow the 's recommended size limits. (There is not a specific height limit for most of these seats, but children are safe as long as the top of their ears are below the top/ back edge of the seat and their shoulders are below the highest shoulder strap slots.) When  "  Occupational History     Employer: [employer]   Tobacco Use    Smoking status: Never    Smokeless tobacco: Never   Vaping Use    Vaping Use: Never used   Substance and Sexual Activity    Alcohol use: Not Currently     Comment: none    Drug use: No    Sexual activity: Not on file   Other Topics Concern    Parent/sibling w/ CABG, MI or angioplasty before 65F 55M? No   Social History Narrative    Not on file     Social Determinants of Health     Financial Resource Strain: Not on file   Food Insecurity: Not on file   Transportation Needs: Not on file   Physical Activity: Not on file   Stress: Not on file   Social Connections: Not on file   Intimate Partner Violence: Not At Risk    Fear of Current or Ex-Partner: No    Emotionally Abused: No    Physically Abused: No    Sexually Abused: No   Housing Stability: Not on file       PHYSICAL EXAM:  Blood pressure 120/81, pulse 116, temperature 98.2  F (36.8  C), temperature source Temporal, resp. rate 16, height 1.448 m (4' 9\"), weight 54 kg (119 lb), SpO2 97 %, not currently breastfeeding.  GENERAL: NAD, on RA  HEENT: EOMI, PERRLA  NECK: Supple and without lymphadenopathy. JVP 12 cm  CV: S1/S2 heard, irregular heart beat, pansystolic murmur gr 3 at LPSB  RESPIRATORY: Normal breath sounds, no wheezes or crackles  GI: Soft and non distended with normoactive bowel sounds present in all quadrants. No tenderness, rebound, guarding  EXTREMITIES: 3+ peripheral edema. 2+ bilateral pedal pulses.   NEUROLOGIC: OAx 3. CN II-XII grossly intact. No focal deficits.   MUSCULOSKELETAL: No joint swelling or tenderness.   SKIN: No jaundice. No acute rashes or lesions.     LABS:  BMP  Recent Labs   Lab 01/24/23  1631   *   POTASSIUM 4.0   CHLORIDE 89*   CO2 21*   BUN 38.2*   CR 1.14   *   VIKTORIYA 9.2     CBC  Recent Labs   Lab 01/24/23  1631   WBC 5.5   HGB 14.7   HCT 43.0   *   PLT 91*   LYMPH 9     INRNo lab results found in last 7 days.  LFTsNo lab " they do outgrow the 5-point harness seat limits, they should ride in a booster seat in the backseat. High-backed booster seats should be used if there are low seat backs or no head rests in your car; backless boosters can be used if your car has high seat backs and head rests.    Street Safety: Teach your child how to be safe when standing outside waiting for a bus or walking to school. Children this age should always be supervised when crossing streets.     Biking: Children (and their parents) should wear properly fitted helmets when biking. Children this age are not safe riding in the street.    Water & Sun Safety: Swimming lessons are a good idea if your child does not know how to swim yet. Even if they can swim, constant supervision by an adult who know how to swim is a must. Remember to use sunscreen with an SPF of 15 or higher when outside and reapply after time in the water.  Your child should avoid prolonged time in the sun between 11 AM and 3 PM and wear hats, sunglasses and sun protection clothing.     Personal Safety: A parent’s safety is just as important as a child’s safety. Violence is common in many people’s lives. If you do not feel safe in your home or if a partner has ever hit, kicked, shoved or physically hurt you or your child, it is important for you to get help. Talk to your doctors or a . In [place], resources include [place] [facility] (946-264-6625) and the [facility] (24 hour hotline is 128- 584-5946); the National Domestic Violence Hotline is 0-274-644-XBAR (9351).    Review with your child that certain body parts (the parts usually covered by a bathing suit) and behaviors are private. For safety purposes, make sure your child knows that they should never keep secrets from parents, and they should always report to you if any adult shows any interest in their private parts (or if an adult discussed or shared their own private parts with a child). Tell  results found in last 7 days.   INFNo lab results found in last 7 days.    IMAGING:    Results for orders placed or performed during the hospital encounter of 01/24/23   XR Chest 2 Views    Narrative    EXAM: XR CHEST 2 VIEWS  1/24/2023 4:22 PM      HISTORY: SOA    COMPARISON: Chest x-ray 12/3/2022    FINDINGS: Two views of the chest. Postsurgical changes in the chest  with intact median sternotomy wires. Left chest wall cardiac pacemaker  with leads in stable position in the right atrium and right ventricle.  Trachea is midline. Stable enlargement of the cardiac silhouette.  Anterior mediastinal calcifications, better visualized on 9/10/2019.  No pleural effusion or pneumothorax. No focal airspace. The upper  abdomen is unremarkable. Stable S-shaped curvature and degenerative  changes of the thoracolumbar spine.      Impression    IMPRESSION: Stable cardiomegaly without focal airspace disease.    I have personally reviewed the examination and initial interpretation  and I agree with the findings.    ALINA HOWARD MD         SYSTEM ID:  [id number]     *Note: Due to a large number of results and/or encounters for the requested time period, some results have not been displayed. A complete set of results can be found in Results Review.       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    # HFpEF (EF 55-60%, 12/4/22)  # Mildly reduced RV function  - Etiology: multifactorial  - Ischemic work up: coronary angiogram  them they should talk to you if any adult is doing or saying anything that makes them uncomfortable, especially if an adult is asking them to keep a secret.    Fires & Burns: Children this age are fascinated by fires and they can be very compulsive--so watch them very carefully around fires,  grills, and stoves. Make sure matches and lighters are safely stored out of reach and continue to keep all hot items out of reach. Have a family fire safety plan, including regular smoke detector (and carbon monoxide detector) battery checks, working fire extinguishers, and escape routes. It is important that children this age know what door they should go out of if the smoke alarm goes off, where to meet family members outside and the importance of not going back into a burning building.     Firearms: Children this age are not capable of understanding how dangerous firearms are and evidence shows a child is safest in a home where no firearms are stored. If there any hunters or firearm owners in your home or anywhere your child is visiting, make sure all weapons are safely stored: unloaded, securely locked and ammunition stored separately.   Smoking: Continue to protect your child from cigarette smoke; secondhand smoke increases the risk of heart and lung disease in your child. Vapors from e-cigarettes may also be harmful, so don’t use those around your child either. If you smoke and are ready to consider quitting, talk to your doctor. Nicotine replacement products can be very helpful in breaking this tough addiction. 6-800-QUIT-NOW is a national help line that can help you find resources; other resources can be found at cdc.gov.       MEDICATION FOR FEVER OR PAIN:   Acetaminophen liquid (e.g., Tylenol or Tempra) may be given every four hours as needed for pain or fever.  Acetaminophen liquid is less concentrated than the infant dropper bottle type.  Be sure to check which product CONCENTRATION you are using.    OLD  11/28/22  - Last TTE: 12/4/2022- EF 55-60%, severe TR and moderate right ventricular dilation is present. Global right ventricular function is mildly reduced  - EKG: AF with RVR rate 111, old ST derepression in II, III, aVF, V3-6  - Volume status: currently hypervolemic, 119 lbs on admission ( lbs)  - NT-proBNP: 2000  - Diuretics: PTA lasix 40 mg BID  - Inotropes: None  - Guideline-directed therapy for HFpEF as below:   - MRA: PTA spironolactone 25 mg qday   - SGLT2i: PTA empagliflozin 10 mg qday  - Afterload reduction: None  - Devices: Dual-lead PPM (Medtronic [id number] [place] XT DR GALLEGOS), VVIR. Pending insurance appeal for CardioMEMS  - Antiplatelets: None  - Statins: None  - Anticoagulation: PTA apixaban 5 mg BID for AFib  - SCD PPX: No ICD (see below)  - Mechanical circulatory support: Not indicated    Plan:  - s/p Lasix 40 mg IV in the ED at 8PM, will re-dose again tonight once on the floor  - Continue PTA spironolactone and empagliflozin  - Strict I&O's with daily weight  - Low Na diet & 2 L fluid restriction  - Monitor K & Mg with goal K > 4 & Mg > 2      # AF with RVR  # Long-standing persistent Atrial flutter/fibrillation (on apixaban)  # NSVT s/p PVC ablation (11/30/2022)  # SSS s/p ppm (2019)   Presented with AF with RVR in the setting of ADHF. Not symptomatic. Hemodynamically stable. Anticipate improvement with HF treatment (e.g. diuresis). [id number] = 2 (+ htn, + gender).  Plan:  - Telemetry  - Continue PTA Apixaban 5 mg bid     # Acute hyponatremia  Likely hypervolemic hyponatremia in the setting of ADHF. Anticipate improvement with diuresis.  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 Seen on BRE from 11/22/22 and remained severe on repeat TTE on 12/5/22 despite achieving clinical euvolemia and presumed dry weight. Patient was evaluated by surgery during last admission in  Concentration INFANT Tylenol/Acetaminophen  Drops (80 MG/0.8 mL)    Child’s Weight: Dose:  36 - 47 pounds:   240 mg (3 droppers)  48 - 59 pounds:   320 mg (4 droppers)    NEW Concentration INFANT Tylenol/Acetaminophen  Drops (160 MG/5 mL)    Child’s Weight: Dose:  36 - 47 pounds:   240 mg (7.5 mL (1 1/2 Teaspoons))  48 - 59 pounds:   320 mg (10.0 mL (2 Teaspoons))    CHILDREN’S Tylenol/Acetaminophen  (160 MG/5 mL)    Child’s Weight: Dose:  36 - 47 pounds:   240 240 mg (7.5 mL (1 1/2 Teaspoons))  48 - 59 pounds:   320 mg (10.0 mL (2 Teaspoons))    CHILDREN’S Tylenol/Acetaminophen MELTAWAYS ( 80 MG tablets)    Child’s Weight:  Dose:  36 - 47 pounds:    240 mg (3 meltaway tablets)  48 - 59 pounds:    320 mg (4 meltaway tablets)    CHILDREN'S Ibuprofen liquid (e.g., Advil or Motrin) may be given every six hours as needed for pain or fever.    Child’s Weight:  Dose:  36 - 47 pounds:    150 MG (1 1/2 Teaspoons)  48 - 59 pounds  200 mg (2 Teaspoons)     Most Recent Immunizations   Administered Date(s) Administered    DTaP 10/27/2021    DTaP/Hep B/IPV 01/25/2021    DTaP/IPV 07/12/2024    Hep A, ped/adol, 2 dose 07/08/2022    Hep B, adolescent or pediatric 2020    Hib (PRP-OMP) 10/27/2021    Influenza, split virus, quadrivalent, PF 01/10/2023    MMR 07/27/2021    Measles Mumps Rubella Varicella 07/12/2024    Pneumococcal conjugate PCV 13 10/27/2021    Rotavirus - pentavalent 01/25/2021    Varicella 07/27/2021       If Gillian develops any of the following reactions within 72 hours after an immunization, notify your pediatrician by calling the pediatric phone nurse:  1.  A temperature of 105 degrees or above.  2.  More than 3 hours of continuous crying.  3.  A shrill, high-pitched cry.  4.  A pale, limp spell.  5.  A seizure or fainting spell.  In this case, you should call 911 or go immediately to the emergency room.      NEXT VISIT:  6 YEARS OF AGE    Thank you for entrusting your care to [facility].    Also,  "12/2022 (Dr. Cope) who thought she may be a candidate for surgical intervention but on day of discharge, patient will like to hold off on pursuing surgical intervention given improvement in her symptoms with plan to re-assess in the future  - CTM    # Cardiac arrest  Per Dr. Eaton, \"we had previously discussed ICD with patient. However, we are leaning towards no ICD at this stage given her ongoing cardiac evaluation after acute events has been normal. Her arrest was likely compounded by multiple factors including liver failure, renal failure, and metabolic derangements which likely cumulatively resulted in her cardiac arrest. She also has a preference not to pursue ICD and understands risk\".  - CTM    # RA  - Continue PTA Plaquenil 200 mg daily and Prednisone 3 mg daily  - Continue PTA Gabapentin 100 mg BID and 200 mg at bedtime  - Holding PTA Alendronate and Calcium supplement, per policy    # History of Diffuse Large B-cell Lymphoma  Diagnosed in 5/2017. S/p RCHOP and ECHOP therapy (total 5 rounds). No history of radiation therapy. Deemed in complete remission in 2019 and graduated out of surveillance at that time. SPEP and UPEP checked in 11/2022 with no evidence of amyloidosis.  - CTM      Floor Care  Fluids: 2L fluid restriction  Food: 2 gram sodium diet  DVT ppx: PTA apixaban  Catheters: none  Lines: PIV  Code Status: full  Discharge plan: inpatient admission, anticipate discharge to prior living situation in 2-3 days    To be staffed in the AM with the cardiology team.      Ivet Howe MD  Internal Medicine Resident (PGY-2)  [facility]  Pager: [phone]     Attending Attestation: I saw, examined and evaluated the patient and reviewed all relevant data. I agree with the findings, assessment and documented in the edited note and summarized the orona findings and plan with the patient.  - Continue IV diuresis  - Hold atenolol (lenient ventricular rate control up to 110 bpm)  - Raise " check out “Children’s Health” on the [facility] Blog for updates on timely topics regarding children’s health!       pacemaker lower rate setting to 75 bpm  -Continue CHF regimen

## 2025-07-30 ENCOUNTER — TELEPHONE (OUTPATIENT)
Dept: CARDIOLOGY | Facility: CLINIC | Age: 65
End: 2025-07-30
Payer: COMMERCIAL

## 2025-08-07 ENCOUNTER — HOSPITAL ENCOUNTER (OUTPATIENT)
Dept: CARDIOLOGY | Facility: CLINIC | Age: 65
Discharge: HOME | End: 2025-08-07
Attending: HOSPITALIST
Payer: COMMERCIAL

## 2025-08-07 DIAGNOSIS — I48.0 PAROXYSMAL ATRIAL FIBRILLATION (H): ICD-10-CM

## 2025-08-07 DIAGNOSIS — I50.30 HEART FAILURE WITH PRESERVED EJECTION FRACTION, NYHA CLASS I (H): ICD-10-CM

## 2025-08-07 DIAGNOSIS — Z95.4 S/P TVR (TRICUSPID VALVE REPLACEMENT): ICD-10-CM

## 2025-08-07 LAB — BI-PLANE LVEF ECHO: NORMAL

## 2025-08-07 PROCEDURE — 93306 TTE W/DOPPLER COMPLETE: CPT

## (undated) DEVICE — INTRO SHEATH MICRO PLATINUM TIP 4FRX40CM 7274

## (undated) DEVICE — SURGICEL HEMOSTAT 4X8" 1952

## (undated) DEVICE — SPONGE SURGIFOAM 100 1974

## (undated) DEVICE — Device

## (undated) DEVICE — PREP CHLORAPREP 26ML TINTED HI-LITE ORANGE 930815

## (undated) DEVICE — DRSG TELFA 3X8" 1238

## (undated) DEVICE — GLOVE BIOGEL PI ULTRATOUCH G SZ 8.0 42180

## (undated) DEVICE — TIES BANDING T50R

## (undated) DEVICE — INTRO SHTH 13CM 7FR 19.5 SFSTH II

## (undated) DEVICE — DRAIN CHEST TUBE 32FR STR 8032

## (undated) DEVICE — ELECTRODE PATIENT RETURN ADULT L10 FT 2 PLATE CORD 0855C

## (undated) DEVICE — GOWN IMPERVIOUS BREATHABLE SMART XLG 89045

## (undated) DEVICE — DRAIN JACKSON PRATT RESERVOIR 100ML SU130-1305

## (undated) DEVICE — SU PROLENE 6-0 C-1DA 30" 8706H

## (undated) DEVICE — CLIP HORIZON MED BLUE 002200

## (undated) DEVICE — SU PLEDGET SOFT TFE 3/8"X3/26"X1/16" PCP40

## (undated) DEVICE — SU PROLENE 4-0 RB-1DA 36" 8557H

## (undated) DEVICE — SLEEVE TR BAND RADIAL COMPRESSION DEVICE 24CM TRB24-REG

## (undated) DEVICE — SU ETHIBOND 0 CT-1 CR 8X18" CX21D

## (undated) DEVICE — ESU CORD BIPOLAR GREEN 10-4000

## (undated) DEVICE — GLOVE BIOGEL PI MICRO SZ 7.5 48575

## (undated) DEVICE — SU VICRYL 0 CTX 36" J370H

## (undated) DEVICE — CATH GD 5.4FR ID / 7FR OD X 43

## (undated) DEVICE — HOOD SURG T7PLUS PEEL AWAY FACE SHIELD STRL LF 0416-801-100

## (undated) DEVICE — SU SILK 0 TIE 6X30" A306H

## (undated) DEVICE — ESU HOLSTER PLASTIC DISP E2400

## (undated) DEVICE — LINEN TOWEL PACK X30 5481

## (undated) DEVICE — CABLE PACING ALLIGATOR CLIP 12FT 5833SL

## (undated) DEVICE — BLADE CLIPPER SGL USE 9680

## (undated) DEVICE — SUCTION CATH AIRLIFE TRI-FLO W/CONTROL PORT 14FR  T60C

## (undated) DEVICE — SU STRATAFIX PDS PLUS 0 SPIRAL CT-1 45CM SXPP1B407

## (undated) DEVICE — GLOVE ESTEEM BLUE W/NEU-THERA 7.5  2D73PB75

## (undated) DEVICE — WIRE GUIDE 0.025"X150CM EMERALD J TIP 502524

## (undated) DEVICE — DRAIN MEDIASTINAL 9MM 14609

## (undated) DEVICE — WIPES FOLEY CARE SURESTEP PROVON DFC100

## (undated) DEVICE — LINEN GOWN XLG 5407

## (undated) DEVICE — GLIDEWIRE TERUMO .035X180CM 1.5,, J-TIP GR3525

## (undated) DEVICE — LINEN TOWEL PACK X6 WHITE 5487

## (undated) DEVICE — SU DERMABOND PRINEO 22CM CLR222US

## (undated) DEVICE — SU ETHIBOND 2-0 SHDA 30" X563H

## (undated) DEVICE — SOL NACL 0.9% 10ML VIAL 0409-4888-02

## (undated) DEVICE — DRSG ABDOMINAL 07 1/2X8" 7197D

## (undated) DEVICE — ESU ELEC BLADE 6" COATED/INSULATED E1455-6

## (undated) DEVICE — TUBING PRESSURE 30"

## (undated) DEVICE — DRAIN JACKSON PRATT 10FR ROUND SU130-1321

## (undated) DEVICE — SOL NACL 0.9% INJ 1000ML BAG 07983-09

## (undated) DEVICE — TAPE MEDIPORE 4"X2YD 2864

## (undated) DEVICE — SUCTION IRR SYSTEM W/O TIP INTERPULSE HANDPIECE 0210-100-000

## (undated) DEVICE — SOL NACL 0.9% IRRIG 1000ML BOTTLE 2F7124

## (undated) DEVICE — ESU NDL COLORADO MICRO E1651

## (undated) DEVICE — INTRO CATH 12CM 8.5FR FST-CATH

## (undated) DEVICE — PREP SCRUB SOL EXIDINE 4% CHG 4OZ 29002-404

## (undated) DEVICE — SOL WATER IRRIG 1000ML BOTTLE 2F7114

## (undated) DEVICE — DRAPE IOBAN INCISE 23X17" 6650EZ

## (undated) DEVICE — SU ETHIBOND 5 LRDA 30" B499T

## (undated) DEVICE — MAT FLOOR SURGICAL 40X38 0702140238

## (undated) DEVICE — TUBING INSUFFLATION PNEUMOCLEAR 0620050100

## (undated) DEVICE — PREP SKIN SCRUB TRAY 4461A

## (undated) DEVICE — HOOD SURG T7 PLUS STRL LF DISP 0416-801-200

## (undated) DEVICE — SU STRATAFIX PDS PLUS 2-0 SPIRAL CT-1 45CM SXPP1B411

## (undated) DEVICE — SU VICRYL 3-0 FS-1 27" J442H

## (undated) DEVICE — SHEATH HEMO FAST CATH RAMP 406965

## (undated) DEVICE — PATCH CARTO 3 EXTERNAL REFERENCE 3D MAPPING CREFP6

## (undated) DEVICE — DRAPE IOBAN 2 C-SECTION 3M 6697

## (undated) DEVICE — DRSG TEGADERM 8X12" 1629

## (undated) DEVICE — KIT HAND CONTROL ACIST 016795

## (undated) DEVICE — DEVICE SUTURE GRASPER TROCAR CLOSURE 14GA PMITCSG

## (undated) DEVICE — STPL SKIN 35W 059037

## (undated) DEVICE — CATH THERMOCOOL SMARTTOUCH SF DF CURVE

## (undated) DEVICE — SU STRATAFIX PDS PLUS 2 CTX SPIRAL L14 IN SXPP2B405

## (undated) DEVICE — SU ETHIBOND 2-0 V-7 DA 10X30" PXX77

## (undated) DEVICE — ELECTRODE DEFIB CADENCE 22550R

## (undated) DEVICE — GLOVE SURG PI ULTRA TOUCH M SZ 8-1/2 LF

## (undated) DEVICE — LEAD PACER MYOCARDIAL BIPOLAR TEMPORARY 53CM 6495F

## (undated) DEVICE — DEFIB PRO-PADZ LVP LQD GEL ADULT 8900-2105-01

## (undated) DEVICE — DECANTER VIAL 2006S

## (undated) DEVICE — NDL ANGIOCATH 14GA 5.25" 382269

## (undated) DEVICE — SU ETHIBOND 2-0 V-7DA 10X30" 10X72

## (undated) DEVICE — SUTURE MONOCRYL 3-0 18 PS2 UND MCP497G

## (undated) DEVICE — DRESSING MEPILEX AG SILVER 4X8 395890

## (undated) DEVICE — GLOVE PROTEXIS POWDER FREE 7.5 ORTHOPEDIC 2D73ET75

## (undated) DEVICE — PREP CHLORAPREP 26ML TINTED ORANGE  260815

## (undated) DEVICE — SUCTION TIP YANKAUER W/O VENT K86

## (undated) DEVICE — SU ETHIBOND 3-0 BBDA 36" X588H

## (undated) DEVICE — PROTECTOR ARM ONE-STEP TRENDELENBURG 40418

## (undated) DEVICE — PAD CHUX UNDERPAD 23X24" 7136

## (undated) DEVICE — CATH TRAY FOLEY SURESTEP 16FR W/URNE MTR STLK LATEX A303316A

## (undated) DEVICE — SU PROLENE 4-0 SHDA 36" 8521H

## (undated) DEVICE — PACK ADULT HEART UMMC PV15CG92D

## (undated) DEVICE — SOL NACL 0.9% INJ 1000ML BAG 2B1324X

## (undated) DEVICE — DRSG STERI STRIP 1/2X4" R1547

## (undated) DEVICE — INTRO GLIDESHEATH SLENDER 6FR 10X45CM 60-1060

## (undated) DEVICE — INTRO SHEATH 7FRX10CM PINNACLE RSS702

## (undated) DEVICE — FASTENER CATH BALLOON CLAMPX2 STATLOCK 0684-00-493

## (undated) DEVICE — BLADE SAGITTAL WIDE (SO-618) 2108-118

## (undated) DEVICE — TUBE SET SMARKABLATE IRRIGATION

## (undated) DEVICE — SUCTION MANIFOLD NEPTUNE 2 SYS 4 PORT 0702-020-000

## (undated) DEVICE — LINEN TOWEL PACK X5 5464

## (undated) DEVICE — GLOVE BIOGEL PI INDICATOR 8.0 LF 41680

## (undated) DEVICE — ESU ELEC BLADE E-SEP INSULATED NEPTUNE 70MM 0703-070-002

## (undated) DEVICE — SU PROLENE 5-0 RB-1DA 36"  8556H

## (undated) DEVICE — REPRO BARD EP XT DECAPL STRBL DX EP CATH 6F

## (undated) DEVICE — SOL ADH LIQUID BENZOIN SWAB 0.6ML C1544

## (undated) DEVICE — NEEDLE HYPO 21GA X 1-1/2 SAFETY 305917

## (undated) DEVICE — ESU GROUND PAD ADULT W/CORD E7507

## (undated) DEVICE — SU STEEL 6 CCS 4X18" M654G

## (undated) DEVICE — PACK HEART LEFT CUSTOM

## (undated) DEVICE — SU PROLENE 5-0 RB-2DA 30" 8710H

## (undated) DEVICE — SU NDL FRENCH EYE 2  1861-2D

## (undated) DEVICE — SU PROLENE 3-0 SHDA 36" 8522H

## (undated) DEVICE — DRSG GAUZE 4X4" 2187

## (undated) DEVICE — DRSG DRAIN 4X4" 7086

## (undated) DEVICE — CUSTOM PACK ANTERIOR HIP SOP5BAHHED

## (undated) DEVICE — ENDO DISSECTOR BLUNT 10MM CHERRY BCD10

## (undated) DEVICE — SUCTION DRY CHEST DRAIN OASIS 3600-100

## (undated) DEVICE — INTRODUCER SHEATH FAST-CATH 9FRX12CM 406116

## (undated) DEVICE — SU VICRYL 0 CT-1 27" UND J260H

## (undated) DEVICE — KIT VENOUS FLUSH 60210177

## (undated) DEVICE — GLOVE UNDER INDICATOR PI SZ 8.5 LF 41685

## (undated) DEVICE — ESU ELEC NDL 1" COATED/INSULATED E1465

## (undated) DEVICE — SUTURE VICRYL+ 2-0 27IN CT-1 UND VCP259H

## (undated) DEVICE — SU FIBERWIRE 2 38" T-8 NDL  AR-7206

## (undated) DEVICE — ESU PENCIL SMOKE EVAC W/ROCKER SWITCH 0703-047-000

## (undated) DEVICE — PREP TECHNI-CARE CHLOROXYLENOL 3% 4OZ BOTTLE C222-4ZWO

## (undated) DEVICE — DRAPE STERI TOWEL LG 1010

## (undated) DEVICE — SU STRATAFIX MONOCRYL 3-0 SPIRAL PS-2 45CM SXMP1B107

## (undated) DEVICE — DRAIN CHEST TUBE RIGHT ANGLED 28FR 8128

## (undated) DEVICE — NDL 21GA 1.5"

## (undated) DEVICE — INTRO SHEATH 4FRX10CM PINNACLE RSS402

## (undated) DEVICE — DRSG XEROFORM 5X9" CUR253590W

## (undated) DEVICE — ORGANIZER SUTURE CIRCUMFERENTIAL BELT MI-ISBA-001

## (undated) DEVICE — VESSEL LOOPS YELLOW MAXI 31145694

## (undated) DEVICE — INTRODUCER SHEATH FAST-CATH CATH-LOCK 8FRX12CM 406706

## (undated) DEVICE — MANIFOLD KIT ANGIO AUTOMATED 014613

## (undated) DEVICE — BONE CLEANING TIP INTERPULSE  0210-010-000

## (undated) DEVICE — KIT PATIENT CARE HANA TABLE PROFX SUPINE 6855

## (undated) DEVICE — PAD DEFIBRILLATOR ELECTRODE 4.25INX6IN ADULT 2001M-C

## (undated) DEVICE — STIMULATOR NERVE NEURO-PULSE SURGICAL LOCATOR 30968-220

## (undated) DEVICE — SOL NACL 0.9% IRRIG 3000ML BAG 2B7477

## (undated) RX ORDER — PROTAMINE SULFATE 10 MG/ML
INJECTION, SOLUTION INTRAVENOUS
Status: DISPENSED
Start: 2022-11-28

## (undated) RX ORDER — PROPOFOL 10 MG/ML
INJECTION, EMULSION INTRAVENOUS
Status: DISPENSED
Start: 2018-05-22

## (undated) RX ORDER — NITROGLYCERIN 5 MG/ML
VIAL (ML) INTRAVENOUS
Status: DISPENSED
Start: 2022-11-28

## (undated) RX ORDER — FENTANYL CITRATE 50 UG/ML
INJECTION, SOLUTION INTRAMUSCULAR; INTRAVENOUS
Status: DISPENSED
Start: 2017-07-21

## (undated) RX ORDER — DEXAMETHASONE SODIUM PHOSPHATE 10 MG/ML
INJECTION, EMULSION INTRAMUSCULAR; INTRAVENOUS
Status: DISPENSED
Start: 2024-01-24

## (undated) RX ORDER — CLINDAMYCIN PHOSPHATE 900 MG/50ML
INJECTION, SOLUTION INTRAVENOUS
Status: DISPENSED
Start: 2023-03-27

## (undated) RX ORDER — FENTANYL CITRATE 50 UG/ML
INJECTION, SOLUTION INTRAMUSCULAR; INTRAVENOUS
Status: DISPENSED
Start: 2022-11-28

## (undated) RX ORDER — LIDOCAINE HYDROCHLORIDE 10 MG/ML
INJECTION, SOLUTION EPIDURAL; INFILTRATION; INTRACAUDAL; PERINEURAL
Status: DISPENSED
Start: 2017-07-27

## (undated) RX ORDER — ETOMIDATE 2 MG/ML
INJECTION INTRAVENOUS
Status: DISPENSED
Start: 2023-03-27

## (undated) RX ORDER — LIDOCAINE HYDROCHLORIDE 10 MG/ML
INJECTION, SOLUTION EPIDURAL; INFILTRATION; INTRACAUDAL; PERINEURAL
Status: DISPENSED
Start: 2024-01-24

## (undated) RX ORDER — LIDOCAINE HYDROCHLORIDE AND EPINEPHRINE 10; 10 MG/ML; UG/ML
INJECTION, SOLUTION INFILTRATION; PERINEURAL
Status: DISPENSED
Start: 2018-02-13

## (undated) RX ORDER — HEPARIN SODIUM 1000 [USP'U]/ML
INJECTION, SOLUTION INTRAVENOUS; SUBCUTANEOUS
Status: DISPENSED
Start: 2017-05-29

## (undated) RX ORDER — FENTANYL CITRATE 50 UG/ML
INJECTION, SOLUTION INTRAMUSCULAR; INTRAVENOUS
Status: DISPENSED
Start: 2024-01-24

## (undated) RX ORDER — FENTANYL CITRATE 50 UG/ML
INJECTION, SOLUTION INTRAMUSCULAR; INTRAVENOUS
Status: DISPENSED
Start: 2017-05-29

## (undated) RX ORDER — DEXAMETHASONE SODIUM PHOSPHATE 4 MG/ML
INJECTION, SOLUTION INTRA-ARTICULAR; INTRALESIONAL; INTRAMUSCULAR; INTRAVENOUS; SOFT TISSUE
Status: DISPENSED
Start: 2023-04-10

## (undated) RX ORDER — ONDANSETRON 4 MG/1
TABLET, ORALLY DISINTEGRATING ORAL
Status: DISPENSED
Start: 2018-02-14

## (undated) RX ORDER — FENTANYL CITRATE 50 UG/ML
INJECTION, SOLUTION INTRAMUSCULAR; INTRAVENOUS
Status: DISPENSED
Start: 2017-07-14

## (undated) RX ORDER — ONDANSETRON 2 MG/ML
INJECTION INTRAMUSCULAR; INTRAVENOUS
Status: DISPENSED
Start: 2019-12-13

## (undated) RX ORDER — DIPHENHYDRAMINE HYDROCHLORIDE 50 MG/ML
INJECTION INTRAMUSCULAR; INTRAVENOUS
Status: DISPENSED
Start: 2023-04-10

## (undated) RX ORDER — NITROGLYCERIN 5 MG/ML
VIAL (ML) INTRAVENOUS
Status: DISPENSED
Start: 2017-05-29

## (undated) RX ORDER — ACETAMINOPHEN 325 MG/1
TABLET ORAL
Status: DISPENSED
Start: 2023-03-27

## (undated) RX ORDER — PROPOFOL 10 MG/ML
INJECTION, EMULSION INTRAVENOUS
Status: DISPENSED
Start: 2024-01-24

## (undated) RX ORDER — ETOMIDATE 2 MG/ML
INJECTION INTRAVENOUS
Status: DISPENSED
Start: 2024-01-24

## (undated) RX ORDER — PHENYLEPHRINE HCL IN 0.9% NACL 1 MG/10 ML
SYRINGE (ML) INTRAVENOUS
Status: DISPENSED
Start: 2018-05-22

## (undated) RX ORDER — FENTANYL CITRATE-0.9 % NACL/PF 10 MCG/ML
PLASTIC BAG, INJECTION (ML) INTRAVENOUS
Status: DISPENSED
Start: 2022-11-28

## (undated) RX ORDER — NICARDIPINE HYDROCHLORIDE 2.5 MG/ML
INJECTION INTRAVENOUS
Status: DISPENSED
Start: 2017-05-29

## (undated) RX ORDER — CHLORHEXIDINE GLUCONATE ORAL RINSE 1.2 MG/ML
SOLUTION DENTAL
Status: DISPENSED
Start: 2023-03-27

## (undated) RX ORDER — PROPOFOL 10 MG/ML
INJECTION, EMULSION INTRAVENOUS
Status: DISPENSED
Start: 2023-04-10

## (undated) RX ORDER — ONDANSETRON 2 MG/ML
INJECTION INTRAMUSCULAR; INTRAVENOUS
Status: DISPENSED
Start: 2024-01-24

## (undated) RX ORDER — EPINEPHRINE 1 MG/ML
INJECTION INTRAMUSCULAR; INTRAVENOUS; SUBCUTANEOUS
Status: DISPENSED
Start: 2017-05-17

## (undated) RX ORDER — ONDANSETRON 2 MG/ML
INJECTION INTRAMUSCULAR; INTRAVENOUS
Status: DISPENSED
Start: 2019-11-19

## (undated) RX ORDER — PROPOFOL 10 MG/ML
INJECTION, EMULSION INTRAVENOUS
Status: DISPENSED
Start: 2023-03-27

## (undated) RX ORDER — HEPARIN SODIUM 1000 [USP'U]/ML
INJECTION, SOLUTION INTRAVENOUS; SUBCUTANEOUS
Status: DISPENSED
Start: 2023-04-10

## (undated) RX ORDER — CLINDAMYCIN PHOSPHATE 900 MG/50ML
INJECTION, SOLUTION INTRAVENOUS
Status: DISPENSED
Start: 2018-05-22

## (undated) RX ORDER — FENTANYL CITRATE 50 UG/ML
INJECTION, SOLUTION INTRAMUSCULAR; INTRAVENOUS
Status: DISPENSED
Start: 2023-04-10

## (undated) RX ORDER — ATROPINE SULFATE 0.1 MG/ML
INJECTION INTRAVENOUS
Status: DISPENSED
Start: 2018-03-23

## (undated) RX ORDER — FENTANYL CITRATE 50 UG/ML
INJECTION, SOLUTION INTRAMUSCULAR; INTRAVENOUS
Status: DISPENSED
Start: 2023-03-27

## (undated) RX ORDER — CIPROFLOXACIN 500 MG/1
TABLET, FILM COATED ORAL
Status: DISPENSED
Start: 2018-03-01

## (undated) RX ORDER — FENTANYL CITRATE 50 UG/ML
INJECTION, SOLUTION INTRAMUSCULAR; INTRAVENOUS
Status: DISPENSED
Start: 2022-11-22

## (undated) RX ORDER — LIDOCAINE HYDROCHLORIDE 10 MG/ML
INJECTION, SOLUTION EPIDURAL; INFILTRATION; INTRACAUDAL; PERINEURAL
Status: DISPENSED
Start: 2017-06-26

## (undated) RX ORDER — ONDANSETRON 2 MG/ML
INJECTION INTRAMUSCULAR; INTRAVENOUS
Status: DISPENSED
Start: 2018-05-22

## (undated) RX ORDER — GABAPENTIN 100 MG/1
CAPSULE ORAL
Status: DISPENSED
Start: 2023-03-27

## (undated) RX ORDER — LIDOCAINE HYDROCHLORIDE 10 MG/ML
INJECTION, SOLUTION EPIDURAL; INFILTRATION; INTRACAUDAL; PERINEURAL
Status: DISPENSED
Start: 2019-11-19

## (undated) RX ORDER — LIDOCAINE HYDROCHLORIDE 20 MG/ML
INJECTION, SOLUTION EPIDURAL; INFILTRATION; INTRACAUDAL; PERINEURAL
Status: DISPENSED
Start: 2018-05-22

## (undated) RX ORDER — FENTANYL CITRATE 50 UG/ML
INJECTION, SOLUTION INTRAMUSCULAR; INTRAVENOUS
Status: DISPENSED
Start: 2018-05-22

## (undated) RX ORDER — LIDOCAINE HYDROCHLORIDE 10 MG/ML
INJECTION, SOLUTION EPIDURAL; INFILTRATION; INTRACAUDAL; PERINEURAL
Status: DISPENSED
Start: 2018-03-07

## (undated) RX ORDER — FENTANYL CITRATE 50 UG/ML
INJECTION, SOLUTION INTRAMUSCULAR; INTRAVENOUS
Status: DISPENSED
Start: 2019-12-13

## (undated) RX ORDER — HEPARIN SODIUM 1000 [USP'U]/ML
INJECTION, SOLUTION INTRAVENOUS; SUBCUTANEOUS
Status: DISPENSED
Start: 2022-11-28

## (undated) RX ORDER — FENTANYL CITRATE 50 UG/ML
INJECTION, SOLUTION INTRAMUSCULAR; INTRAVENOUS
Status: DISPENSED
Start: 2022-12-01

## (undated) RX ORDER — NICARDIPINE HCL-0.9% SOD CHLOR 1 MG/10 ML
SYRINGE (ML) INTRAVENOUS
Status: DISPENSED
Start: 2022-11-28

## (undated) RX ORDER — FENTANYL CITRATE-0.9 % NACL/PF 10 MCG/ML
PLASTIC BAG, INJECTION (ML) INTRAVENOUS
Status: DISPENSED
Start: 2023-03-27

## (undated) RX ORDER — HEPARIN SODIUM 200 [USP'U]/100ML
INJECTION, SOLUTION INTRAVENOUS
Status: DISPENSED
Start: 2025-05-19

## (undated) RX ORDER — LIDOCAINE HYDROCHLORIDE 10 MG/ML
INJECTION, SOLUTION EPIDURAL; INFILTRATION; INTRACAUDAL; PERINEURAL
Status: DISPENSED
Start: 2017-12-18

## (undated) RX ORDER — FAMOTIDINE 20 MG/1
TABLET, FILM COATED ORAL
Status: DISPENSED
Start: 2023-04-10

## (undated) RX ORDER — LIDOCAINE HYDROCHLORIDE 10 MG/ML
INJECTION, SOLUTION EPIDURAL; INFILTRATION; INTRACAUDAL; PERINEURAL
Status: DISPENSED
Start: 2022-11-28

## (undated) RX ORDER — CLINDAMYCIN PHOSPHATE 900 MG/50ML
INJECTION, SOLUTION INTRAVENOUS
Status: DISPENSED
Start: 2023-04-10

## (undated) RX ORDER — LIDOCAINE HYDROCHLORIDE 20 MG/ML
SOLUTION OROPHARYNGEAL
Status: DISPENSED
Start: 2022-11-22

## (undated) RX ORDER — FAMOTIDINE 20 MG/1
TABLET, FILM COATED ORAL
Status: DISPENSED
Start: 2023-03-27

## (undated) RX ORDER — DEXAMETHASONE SODIUM PHOSPHATE 4 MG/ML
INJECTION, SOLUTION INTRA-ARTICULAR; INTRALESIONAL; INTRAMUSCULAR; INTRAVENOUS; SOFT TISSUE
Status: DISPENSED
Start: 2023-03-27

## (undated) RX ORDER — CALCIUM CHLORIDE 100 MG/ML
INJECTION INTRAVENOUS; INTRAVENTRICULAR
Status: DISPENSED
Start: 2018-03-23

## (undated) RX ORDER — VANCOMYCIN HYDROCHLORIDE 1 G/20ML
INJECTION, POWDER, LYOPHILIZED, FOR SOLUTION INTRAVENOUS
Status: DISPENSED
Start: 2023-04-10

## (undated) RX ORDER — FENTANYL CITRATE 50 UG/ML
INJECTION, SOLUTION INTRAMUSCULAR; INTRAVENOUS
Status: DISPENSED
Start: 2017-05-17

## (undated) RX ORDER — CHLORHEXIDINE GLUCONATE ORAL RINSE 1.2 MG/ML
SOLUTION DENTAL
Status: DISPENSED
Start: 2023-04-10

## (undated) RX ORDER — ONDANSETRON 2 MG/ML
INJECTION INTRAMUSCULAR; INTRAVENOUS
Status: DISPENSED
Start: 2018-04-12

## (undated) RX ORDER — CLINDAMYCIN PHOSPHATE 600 MG/50ML
INJECTION, SOLUTION INTRAVENOUS
Status: DISPENSED
Start: 2019-12-13

## (undated) RX ORDER — GABAPENTIN 100 MG/1
CAPSULE ORAL
Status: DISPENSED
Start: 2023-04-10

## (undated) RX ORDER — ONDANSETRON 2 MG/ML
INJECTION INTRAMUSCULAR; INTRAVENOUS
Status: DISPENSED
Start: 2023-03-27

## (undated) RX ORDER — CALCIUM CHLORIDE 100 MG/ML
INJECTION INTRAVENOUS; INTRAVENTRICULAR
Status: DISPENSED
Start: 2017-05-17

## (undated) RX ORDER — LIDOCAINE HYDROCHLORIDE 10 MG/ML
INJECTION, SOLUTION EPIDURAL; INFILTRATION; INTRACAUDAL; PERINEURAL
Status: DISPENSED
Start: 2023-04-10

## (undated) RX ORDER — LIDOCAINE HYDROCHLORIDE 10 MG/ML
INJECTION, SOLUTION EPIDURAL; INFILTRATION; INTRACAUDAL; PERINEURAL
Status: DISPENSED
Start: 2017-09-07

## (undated) RX ORDER — PROPOFOL 10 MG/ML
INJECTION, EMULSION INTRAVENOUS
Status: DISPENSED
Start: 2019-11-19

## (undated) RX ORDER — DEXAMETHASONE SODIUM PHOSPHATE 4 MG/ML
INJECTION, SOLUTION INTRA-ARTICULAR; INTRALESIONAL; INTRAMUSCULAR; INTRAVENOUS; SOFT TISSUE
Status: DISPENSED
Start: 2018-05-22

## (undated) RX ORDER — LIDOCAINE HYDROCHLORIDE 10 MG/ML
INJECTION, SOLUTION EPIDURAL; INFILTRATION; INTRACAUDAL; PERINEURAL
Status: DISPENSED
Start: 2017-11-07

## (undated) RX ORDER — ASPIRIN 325 MG
TABLET ORAL
Status: DISPENSED
Start: 2022-11-28

## (undated) RX ORDER — GLYCOPYRROLATE 0.2 MG/ML
INJECTION, SOLUTION INTRAMUSCULAR; INTRAVENOUS
Status: DISPENSED
Start: 2024-01-24

## (undated) RX ORDER — ONDANSETRON 2 MG/ML
INJECTION INTRAMUSCULAR; INTRAVENOUS
Status: DISPENSED
Start: 2018-03-23

## (undated) RX ORDER — ESMOLOL HYDROCHLORIDE 10 MG/ML
INJECTION INTRAVENOUS
Status: DISPENSED
Start: 2023-04-10

## (undated) RX ORDER — ACETAMINOPHEN 325 MG/1
TABLET ORAL
Status: DISPENSED
Start: 2023-04-10